# Patient Record
Sex: FEMALE | Race: WHITE | NOT HISPANIC OR LATINO | URBAN - METROPOLITAN AREA
[De-identification: names, ages, dates, MRNs, and addresses within clinical notes are randomized per-mention and may not be internally consistent; named-entity substitution may affect disease eponyms.]

---

## 2021-01-12 ENCOUNTER — INPATIENT (INPATIENT)
Facility: HOSPITAL | Age: 71
LOS: 7 days | Discharge: HOME | DRG: 629 | End: 2021-01-20
Attending: STUDENT IN AN ORGANIZED HEALTH CARE EDUCATION/TRAINING PROGRAM | Admitting: HOSPITALIST
Payer: MEDICARE

## 2021-01-12 VITALS
WEIGHT: 293 LBS | HEIGHT: 71 IN | RESPIRATION RATE: 16 BRPM | DIASTOLIC BLOOD PRESSURE: 57 MMHG | HEART RATE: 81 BPM | OXYGEN SATURATION: 98 % | SYSTOLIC BLOOD PRESSURE: 137 MMHG | TEMPERATURE: 99 F

## 2021-01-12 DIAGNOSIS — Z98.890 OTHER SPECIFIED POSTPROCEDURAL STATES: Chronic | ICD-10-CM

## 2021-01-12 DIAGNOSIS — Z86.69 PERSONAL HISTORY OF OTHER DISEASES OF THE NERVOUS SYSTEM AND SENSE ORGANS: Chronic | ICD-10-CM

## 2021-01-12 DIAGNOSIS — E11.9 TYPE 2 DIABETES MELLITUS WITHOUT COMPLICATIONS: ICD-10-CM

## 2021-01-12 DIAGNOSIS — L03.115 CELLULITIS OF RIGHT LOWER LIMB: ICD-10-CM

## 2021-01-12 DIAGNOSIS — I10 ESSENTIAL (PRIMARY) HYPERTENSION: ICD-10-CM

## 2021-01-12 DIAGNOSIS — I89.0 LYMPHEDEMA, NOT ELSEWHERE CLASSIFIED: ICD-10-CM

## 2021-01-12 DIAGNOSIS — Z29.9 ENCOUNTER FOR PROPHYLACTIC MEASURES, UNSPECIFIED: ICD-10-CM

## 2021-01-12 DIAGNOSIS — M75.00 ADHESIVE CAPSULITIS OF UNSPECIFIED SHOULDER: Chronic | ICD-10-CM

## 2021-01-12 DIAGNOSIS — S02.91XA UNSPECIFIED FRACTURE OF SKULL, INITIAL ENCOUNTER FOR CLOSED FRACTURE: Chronic | ICD-10-CM

## 2021-01-12 DIAGNOSIS — S90.851A SUPERFICIAL FOREIGN BODY, RIGHT FOOT, INITIAL ENCOUNTER: ICD-10-CM

## 2021-01-12 DIAGNOSIS — E11.69 TYPE 2 DIABETES MELLITUS WITH OTHER SPECIFIED COMPLICATION: ICD-10-CM

## 2021-01-12 LAB
ALBUMIN SERPL ELPH-MCNC: 2.7 G/DL — LOW (ref 3.3–5)
ALP SERPL-CCNC: 78 U/L — SIGNIFICANT CHANGE UP (ref 40–120)
ALT FLD-CCNC: 26 U/L — SIGNIFICANT CHANGE UP (ref 12–78)
ANION GAP SERPL CALC-SCNC: 5 MMOL/L — SIGNIFICANT CHANGE UP (ref 5–17)
APTT BLD: 26.9 SEC — LOW (ref 27.5–35.5)
AST SERPL-CCNC: 32 U/L — SIGNIFICANT CHANGE UP (ref 15–37)
BASOPHILS # BLD AUTO: 0.06 K/UL — SIGNIFICANT CHANGE UP (ref 0–0.2)
BASOPHILS NFR BLD AUTO: 0.4 % — SIGNIFICANT CHANGE UP (ref 0–2)
BILIRUB SERPL-MCNC: 0.7 MG/DL — SIGNIFICANT CHANGE UP (ref 0.2–1.2)
BUN SERPL-MCNC: 9 MG/DL — SIGNIFICANT CHANGE UP (ref 7–23)
CALCIUM SERPL-MCNC: 7.9 MG/DL — LOW (ref 8.5–10.1)
CHLORIDE SERPL-SCNC: 102 MMOL/L — SIGNIFICANT CHANGE UP (ref 96–108)
CO2 SERPL-SCNC: 30 MMOL/L — SIGNIFICANT CHANGE UP (ref 22–31)
CREAT SERPL-MCNC: 0.94 MG/DL — SIGNIFICANT CHANGE UP (ref 0.5–1.3)
EOSINOPHIL # BLD AUTO: 0.02 K/UL — SIGNIFICANT CHANGE UP (ref 0–0.5)
EOSINOPHIL NFR BLD AUTO: 0.1 % — SIGNIFICANT CHANGE UP (ref 0–6)
GLUCOSE SERPL-MCNC: 152 MG/DL — HIGH (ref 70–99)
HCT VFR BLD CALC: 39.6 % — SIGNIFICANT CHANGE UP (ref 34.5–45)
HGB BLD-MCNC: 12.9 G/DL — SIGNIFICANT CHANGE UP (ref 11.5–15.5)
IMM GRANULOCYTES NFR BLD AUTO: 0.6 % — SIGNIFICANT CHANGE UP (ref 0–1.5)
INR BLD: 1.26 RATIO — HIGH (ref 0.88–1.16)
LACTATE SERPL-SCNC: 2 MMOL/L — SIGNIFICANT CHANGE UP (ref 0.7–2)
LYMPHOCYTES # BLD AUTO: 0.58 K/UL — LOW (ref 1–3.3)
LYMPHOCYTES # BLD AUTO: 3.7 % — LOW (ref 13–44)
MCHC RBC-ENTMCNC: 29.1 PG — SIGNIFICANT CHANGE UP (ref 27–34)
MCHC RBC-ENTMCNC: 32.6 GM/DL — SIGNIFICANT CHANGE UP (ref 32–36)
MCV RBC AUTO: 89.2 FL — SIGNIFICANT CHANGE UP (ref 80–100)
MONOCYTES # BLD AUTO: 1.39 K/UL — HIGH (ref 0–0.9)
MONOCYTES NFR BLD AUTO: 8.8 % — SIGNIFICANT CHANGE UP (ref 2–14)
NEUTROPHILS # BLD AUTO: 13.68 K/UL — HIGH (ref 1.8–7.4)
NEUTROPHILS NFR BLD AUTO: 86.4 % — HIGH (ref 43–77)
NRBC # BLD: 0 /100 WBCS — SIGNIFICANT CHANGE UP (ref 0–0)
PLATELET # BLD AUTO: 290 K/UL — SIGNIFICANT CHANGE UP (ref 150–400)
POTASSIUM SERPL-MCNC: 3.6 MMOL/L — SIGNIFICANT CHANGE UP (ref 3.5–5.3)
POTASSIUM SERPL-SCNC: 3.6 MMOL/L — SIGNIFICANT CHANGE UP (ref 3.5–5.3)
PROT SERPL-MCNC: 7.2 G/DL — SIGNIFICANT CHANGE UP (ref 6–8.3)
PROTHROM AB SERPL-ACNC: 14.6 SEC — HIGH (ref 10.6–13.6)
RBC # BLD: 4.44 M/UL — SIGNIFICANT CHANGE UP (ref 3.8–5.2)
RBC # FLD: 13.2 % — SIGNIFICANT CHANGE UP (ref 10.3–14.5)
SARS-COV-2 RNA SPEC QL NAA+PROBE: SIGNIFICANT CHANGE UP
SODIUM SERPL-SCNC: 137 MMOL/L — SIGNIFICANT CHANGE UP (ref 135–145)
WBC # BLD: 15.83 K/UL — HIGH (ref 3.8–10.5)
WBC # FLD AUTO: 15.83 K/UL — HIGH (ref 3.8–10.5)

## 2021-01-12 PROCEDURE — 99284 EMERGENCY DEPT VISIT MOD MDM: CPT

## 2021-01-12 PROCEDURE — 99222 1ST HOSP IP/OBS MODERATE 55: CPT | Mod: 57

## 2021-01-12 PROCEDURE — 93010 ELECTROCARDIOGRAM REPORT: CPT

## 2021-01-12 PROCEDURE — 99223 1ST HOSP IP/OBS HIGH 75: CPT | Mod: GC

## 2021-01-12 PROCEDURE — 73590 X-RAY EXAM OF LOWER LEG: CPT | Mod: 26,50

## 2021-01-12 PROCEDURE — 71045 X-RAY EXAM CHEST 1 VIEW: CPT | Mod: 26

## 2021-01-12 PROCEDURE — 93970 EXTREMITY STUDY: CPT | Mod: 26

## 2021-01-12 PROCEDURE — 73630 X-RAY EXAM OF FOOT: CPT | Mod: 26,RT

## 2021-01-12 RX ORDER — SODIUM CHLORIDE 9 MG/ML
2200 INJECTION INTRAMUSCULAR; INTRAVENOUS; SUBCUTANEOUS ONCE
Refills: 0 | Status: COMPLETED | OUTPATIENT
Start: 2021-01-12 | End: 2021-01-12

## 2021-01-12 RX ORDER — METOPROLOL TARTRATE 50 MG
50 TABLET ORAL DAILY
Refills: 0 | Status: DISCONTINUED | OUTPATIENT
Start: 2021-01-12 | End: 2021-01-15

## 2021-01-12 RX ORDER — DEXTROSE 50 % IN WATER 50 %
12.5 SYRINGE (ML) INTRAVENOUS ONCE
Refills: 0 | Status: DISCONTINUED | OUTPATIENT
Start: 2021-01-12 | End: 2021-01-13

## 2021-01-12 RX ORDER — GLUCAGON INJECTION, SOLUTION 0.5 MG/.1ML
1 INJECTION, SOLUTION SUBCUTANEOUS ONCE
Refills: 0 | Status: DISCONTINUED | OUTPATIENT
Start: 2021-01-12 | End: 2021-01-13

## 2021-01-12 RX ORDER — MEROPENEM 1 G/30ML
500 INJECTION INTRAVENOUS ONCE
Refills: 0 | Status: COMPLETED | OUTPATIENT
Start: 2021-01-12 | End: 2021-01-12

## 2021-01-12 RX ORDER — INSULIN GLARGINE 100 [IU]/ML
35 INJECTION, SOLUTION SUBCUTANEOUS
Qty: 0 | Refills: 0 | DISCHARGE

## 2021-01-12 RX ORDER — ONDANSETRON 8 MG/1
4 TABLET, FILM COATED ORAL ONCE
Refills: 0 | Status: COMPLETED | OUTPATIENT
Start: 2021-01-12 | End: 2021-01-12

## 2021-01-12 RX ORDER — DEXTROSE 50 % IN WATER 50 %
25 SYRINGE (ML) INTRAVENOUS ONCE
Refills: 0 | Status: DISCONTINUED | OUTPATIENT
Start: 2021-01-12 | End: 2021-01-13

## 2021-01-12 RX ORDER — VANCOMYCIN HCL 1 G
1000 VIAL (EA) INTRAVENOUS ONCE
Refills: 0 | Status: COMPLETED | OUTPATIENT
Start: 2021-01-12 | End: 2021-01-12

## 2021-01-12 RX ORDER — INSULIN LISPRO 100/ML
VIAL (ML) SUBCUTANEOUS
Refills: 0 | Status: DISCONTINUED | OUTPATIENT
Start: 2021-01-12 | End: 2021-01-13

## 2021-01-12 RX ORDER — HEPARIN SODIUM 5000 [USP'U]/ML
5000 INJECTION INTRAVENOUS; SUBCUTANEOUS ONCE
Refills: 0 | Status: COMPLETED | OUTPATIENT
Start: 2021-01-12 | End: 2021-01-12

## 2021-01-12 RX ORDER — INSULIN LISPRO 100/ML
VIAL (ML) SUBCUTANEOUS AT BEDTIME
Refills: 0 | Status: DISCONTINUED | OUTPATIENT
Start: 2021-01-12 | End: 2021-01-13

## 2021-01-12 RX ORDER — MEROPENEM 1 G/30ML
1000 INJECTION INTRAVENOUS EVERY 8 HOURS
Refills: 0 | Status: DISCONTINUED | OUTPATIENT
Start: 2021-01-12 | End: 2021-01-14

## 2021-01-12 RX ORDER — DEXTROSE 50 % IN WATER 50 %
15 SYRINGE (ML) INTRAVENOUS ONCE
Refills: 0 | Status: DISCONTINUED | OUTPATIENT
Start: 2021-01-12 | End: 2021-01-13

## 2021-01-12 RX ORDER — ATORVASTATIN CALCIUM 80 MG/1
40 TABLET, FILM COATED ORAL AT BEDTIME
Refills: 0 | Status: DISCONTINUED | OUTPATIENT
Start: 2021-01-12 | End: 2021-01-15

## 2021-01-12 RX ORDER — SODIUM CHLORIDE 9 MG/ML
1000 INJECTION, SOLUTION INTRAVENOUS
Refills: 0 | Status: DISCONTINUED | OUTPATIENT
Start: 2021-01-12 | End: 2021-01-13

## 2021-01-12 RX ORDER — POTASSIUM CHLORIDE 20 MEQ
1 PACKET (EA) ORAL
Qty: 0 | Refills: 0 | DISCHARGE

## 2021-01-12 RX ORDER — FUROSEMIDE 40 MG
40 TABLET ORAL DAILY
Refills: 0 | Status: DISCONTINUED | OUTPATIENT
Start: 2021-01-12 | End: 2021-01-15

## 2021-01-12 RX ORDER — VANCOMYCIN HCL 1 G
2000 VIAL (EA) INTRAVENOUS EVERY 12 HOURS
Refills: 0 | Status: DISCONTINUED | OUTPATIENT
Start: 2021-01-12 | End: 2021-01-13

## 2021-01-12 RX ORDER — INSULIN GLARGINE 100 [IU]/ML
17 INJECTION, SOLUTION SUBCUTANEOUS AT BEDTIME
Refills: 0 | Status: DISCONTINUED | OUTPATIENT
Start: 2021-01-12 | End: 2021-01-14

## 2021-01-12 RX ADMIN — Medication 250 MILLIGRAM(S): at 23:50

## 2021-01-12 RX ADMIN — Medication 250 MILLIGRAM(S): at 18:33

## 2021-01-12 RX ADMIN — INSULIN GLARGINE 17 UNIT(S): 100 INJECTION, SOLUTION SUBCUTANEOUS at 23:51

## 2021-01-12 RX ADMIN — ATORVASTATIN CALCIUM 40 MILLIGRAM(S): 80 TABLET, FILM COATED ORAL at 23:51

## 2021-01-12 RX ADMIN — MEROPENEM 100 MILLIGRAM(S): 1 INJECTION INTRAVENOUS at 17:15

## 2021-01-12 RX ADMIN — SODIUM CHLORIDE 2200 MILLILITER(S): 9 INJECTION INTRAMUSCULAR; INTRAVENOUS; SUBCUTANEOUS at 17:14

## 2021-01-12 RX ADMIN — HEPARIN SODIUM 5000 UNIT(S): 5000 INJECTION INTRAVENOUS; SUBCUTANEOUS at 23:51

## 2021-01-12 RX ADMIN — ONDANSETRON 4 MILLIGRAM(S): 8 TABLET, FILM COATED ORAL at 17:14

## 2021-01-12 NOTE — H&P ADULT - PROBLEM SELECTOR PLAN 4
-On lantus 35 units qhs and novolog sliding scale  -Will give lantus 20 unts qhs while inpatient as diet is more controlled  -ISS  -Hypoglycemia protocol -On lantus 35 units qhs and novolog sliding scale  -Will give lantus 17 unts qhs as keeping patient NPO after MN  -ISS  -Hypoglycemia protocol -Continue home enalapril, metoprolol with hold parameters  -Monitor routine hemodynamics

## 2021-01-12 NOTE — ED ADULT NURSE NOTE - CHPI ED NUR SYMPTOMS NEG
no bleeding at site/no blood in mucus/no chills/no drainage/no fever/no purulent drainage/no rectal pain

## 2021-01-12 NOTE — ED PROVIDER NOTE - OBJECTIVE STATEMENT
71 yo F PMHx HTN, DM, lymphedema presents to ED c/o right lower leg pain/swelling and redness x 2-3 days. Pt also c/o nausea no vomiting/diarrhea, no abd pain. Pt denies any trauma, numbness/tingling, calf pain, fever/chills. States PCP told her to come to the ED for evaluation.  PCP-Leonardo

## 2021-01-12 NOTE — CONSULT NOTE ADULT - PROBLEM SELECTOR RECOMMENDATION 2
Right foot cleansed with NS and dressed with Aquacel, DSD  RFWCx obtained  Adup and NIVS ordered  Vascular consulted  f/u vascular recommendations      Planned Procedure:    Right foot I&D w/ foreign body removal - not booked    Podiatry will follow pt while in house

## 2021-01-12 NOTE — H&P ADULT - HISTORY OF PRESENT ILLNESS
Patient is 69 yo F PMHx HTN, DM, lymphedema presents to ED c/o right lower leg pain/swelling and redness for the past 3 days. States redness and swelling got significant worse overnight and subsequently notified her podiatrist Dr. Mohan who recommended ED eval. States she had a previous history of cellulitis about 10 years which required "heavy duty antibiotics." Of note, patient also had a few episodes of NBNB emesis on Friday and Monday but attributes it to a salad she ate which wasn't fresh. At this time, patient is lying in bed, appears comfortable, states pain is well controlled.     In the ED, vitals were temp 98.8, HR 81, /57, RR 16, O2 98%  Labs were significant for WBC 15.83 with L shift, aPTT 26.9, PT 14.6, INR 1.26, glucose 1152, calcium 7.9, albumin 2.7  CXR: Grossly clear lungs  Xray tibia/fibula: Arthritic changes. No evidence of destructive changes below the knees.  Xray foot: Significant soft tissue swelling, predominantly involving the lower leg. No radiographic evidence of osteomyelitis.Suspect linear foreign bodies in the plantar soft tissues at the level of metatarsals as described above.   Patient is 69 yo F PMHx HTN, DM, lymphedema presents to ED c/o right lower leg pain/swelling and redness for the past 3 days. States redness and swelling got significant worse overnight and subsequently notified her podiatrist Dr. Mohan who recommended ED eval. States she had a previous history of cellulitis about 10 years which required "heavy duty antibiotics." Of note, patient also had a few episodes of NBNB emesis on Friday and Monday but attributes it to a salad she ate which wasn't fresh. At this time, patient is lying in bed, appears comfortable, states pain is well controlled.     In the ED, vitals were temp 98.8, HR 81, /57, RR 16, O2 98%  Labs were significant for WBC 15.83 with L shift, aPTT 26.9, PT 14.6, INR 1.26, glucose 1152, calcium 7.9, albumin 2.7  CXR: Grossly clear lungs  Xray tibia/fibula: Arthritic changes. No evidence of destructive changes below the knees.  Xray foot: Significant soft tissue swelling, predominantly involving the lower leg. No radiographic evidence of osteomyelitis.Suspect linear foreign bodies in the plantar soft tissues at the level of metatarsals as described above.  S/p vanco x1, meropenem x1, NS 2200cc bolus, zofran x1   Patient is 71 yo F PMHx HTN, DM, lymphedema presents to ED c/o right lower leg pain/swelling and redness for the past 3 days. States redness and swelling got significant worse overnight and subsequently notified her podiatrist Dr. Mohan who recommended ED eval. States she had a previous history of cellulitis about 10 years which required "heavy duty antibiotics." Of note, patient also had a few episodes of NBNB emesis on Friday and Monday but attributes it to a salad she ate which wasn't fresh. At this time, patient is lying in bed, appears comfortable, states pain is well controlled.     In the ED, vitals were temp 98.8, HR 81, /57, RR 16, O2 98%  Labs were significant for WBC 15.83 with L shift, aPTT 26.9, PT 14.6, INR 1.26, glucose 1152, calcium 7.9, albumin 2.7  CXR: Grossly clear lungs  Xray tibia/fibula: Arthritic changes. No evidence of destructive changes below the knees.  Xray foot: Significant soft tissue swelling, predominantly involving the lower leg. No radiographic evidence of osteomyelitis.Suspect linear foreign bodies in the plantar soft tissues at the level of metatarsals as described above.  EKG: SR with 1 st degree AV block, known LBBB, 64bpm  S/p vanco x1, meropenem x1, NS 2200cc bolus, zofran x1   Patient is 71 yo F PMHx HTN, DM2, lymphedema presents to ED c/o right lower leg pain/swelling and redness for the past 3 days. States redness and swelling got significant worse overnight and subsequently notified her podiatrist Dr. Mohan who recommended ED eval. States she had a previous history of cellulitis about 10 years which required "heavy duty antibiotics." Of note, patient also had a few episodes of NBNB emesis on Friday and Monday but attributes it to a salad she ate which wasn't fresh. At this time, patient is lying in bed, appears comfortable, states pain is well controlled.     In the ED, vitals were temp 98.8, HR 81, /57, RR 16, O2 98%  Labs were significant for WBC 15.83 with L shift, aPTT 26.9, PT 14.6, INR 1.26, glucose 1152, calcium 7.9, albumin 2.7  CXR: Grossly clear lungs  Xray tibia/fibula: Arthritic changes. No evidence of destructive changes below the knees.  Xray foot: Significant soft tissue swelling, predominantly involving the lower leg. No radiographic evidence of osteomyelitis.Suspect linear foreign bodies in the plantar soft tissues at the level of metatarsals as described above.  EKG: SR with 1 st degree AV block, known LBBB, 64bpm  S/p vanco x1, meropenem x1, NS 2200cc bolus, zofran x1 Patient is 71 yo F PMHx HTN, DM2, LBBB (w/ neg stress, TTE 6/21 w/ mild AS), lymphedema presents to ED c/o right lower leg pain/swelling and redness for the past 3 days. States redness and swelling got significant worse overnight and subsequently notified her podiatrist Dr. Mohan who recommended ED eval. States she had a previous history of cellulitis about 10 years which required "heavy duty antibiotics." Of note, patient also had a few episodes of NBNB emesis on Friday and Monday but attributes it to a salad she ate which wasn't fresh. At this time, patient is lying in bed, appears comfortable, states pain is well controlled.     In the ED, vitals were temp 98.8, HR 81, /57, RR 16, O2 98%  Labs were significant for WBC 15.83 with L shift, aPTT 26.9, PT 14.6, INR 1.26, glucose 1152, calcium 7.9, albumin 2.7  CXR: Grossly clear lungs  Xray tibia/fibula: Arthritic changes. No evidence of destructive changes below the knees.  Xray foot: Significant soft tissue swelling, predominantly involving the lower leg. No radiographic evidence of osteomyelitis.Suspect linear foreign bodies in the plantar soft tissues at the level of metatarsals as described above.  EKG: SR with 1 st degree AV block, known LBBB, 64bpm  S/p vanco x1, meropenem x1, NS 2200cc bolus, zofran x1

## 2021-01-12 NOTE — ED ADULT NURSE REASSESSMENT NOTE - NS ED NURSE REASSESS COMMENT FT1
Pt received from [facility]. A+O x 4. Right foot and leg redness, swelling, and discharge. Walks with a cane at baseline from home. calm and cooperative. side rails up. VSS. awaiting admission. plan of care discussed with patient. labs pending. covid swab pending. will continue to monitor.

## 2021-01-12 NOTE — ED PROVIDER NOTE - PSH
Fractured skull    Frozen shoulder    H/O Achilles tendon repair  lengthened bilaterally  H/O cataract  bilateral  History of surgical removal of meniscus of knee  left in 1971

## 2021-01-12 NOTE — ED PROVIDER NOTE - ATTENDING CONTRIBUTION TO CARE
71 yo diabetic with RLE redness and swelling x past ~ 2 - 3 days. no fever/chills. no cp/sob/palp. no numb/ting/focal weak. no neck / back pain. no vomiting / diarrhea. no known covid exposures. No agg/allev factors. No other acute co.  Exam: morbid obesity,  chronic bl le edema. RLE ant lower leg erythema / warmth. nl dist str/sens equal bl, nl cap refill. No other acute findings.   Check labs, IV abx, XR, TBA

## 2021-01-12 NOTE — H&P ADULT - NSHPPHYSICALEXAM_GEN_ALL_CORE
Physical Exam  General: No apparent distress  Head: normocephalic, atraumatic  Eyes: EOMI, anicteric  ENT: moist mucous membranes  Heart: rrr, S1, S2, no murmurs  Chest: CTA b/l, no rales, rhonchi, or wheezes  Abd: BS+, soft, NT, ND  Extr: RLE erythematous, edematous, tender/warm to touch, LLE chronic venous stasis changes  Neuro: AA&Ox3, no focal weakness, sensation to light touch intact  Psych: normal affect T(C): 36.9 (01-12-21 @ 20:30), Max: 37.1 (01-12-21 @ 15:16)  HR: 74 (01-12-21 @ 20:30) (74 - 81)  BP: 151/64 (01-12-21 @ 20:30) (137/57 - 151/64)  RR: 16 (01-12-21 @ 20:30) (16 - 16)  SpO2: 97% (01-12-21 @ 20:30) (97% - 98%)    Constitutional: NAD, well-developed, well-nourished  Ears, Nose, Mouth, and Throat: normal external ears and nose, normal hearing, moist oral mucosa  Eyes: normal conjunctiva, EOMI, PERRL  Neck: supple, no JVD  Respiratory: Clear to auscultation bilaterally. No wheezes, rales or rhonchi. Normal respiratory effort  Cardiovascular: RRR, no M/R/G, no edema, 2+ Peripheral Pulses  Gastrointestinal: soft, nontender, nondistended, +BS, no hernia  Skin: +RLE erythematous, edematous, tender/warm to touch, LLE chronic venous stasis changes  Neurologic: sensation grossly intact, CN grossly intact, non-focal exam  Musculoskeletal: no clubbing, no cyanosis, no joint swelling  Psychiatric: AOX3, appropriate mood, affect

## 2021-01-12 NOTE — H&P ADULT - PROBLEM SELECTOR PLAN 2
Xray tibia/fibula: Arthritic changes. No evidence of destructive changes below the knees.  -Xray foot: Significant soft tissue swelling, predominantly involving the lower leg. No radiographic evidence of osteomyelitis. Suspect linear foreign bodies in the plantar soft tissues at the level of metatarsals as described above.  -F/u wound culture  -Plan for Right foot I&D w/ foreign body removal, pending vascular eval  -Vascular consulted by podiatry, f/u recs  -Keep NPO after midnight in case of OR tomorrow

## 2021-01-12 NOTE — ED PROVIDER NOTE - CARE PLAN
Principal Discharge DX:	Cellulitis of right lower extremity   Principal Discharge DX:	Cellulitis of right lower extremity  Secondary Diagnosis:	Foreign body (FB) in soft tissue

## 2021-01-12 NOTE — ED ADULT NURSE NOTE - OBJECTIVE STATEMENT
Pt p/w R foot redness and swelling x 1 day. Hx of cellulitis to foot. Pt reports gradual onset of redness and nausea today. Hx of diabetes and weeping edema.

## 2021-01-12 NOTE — H&P ADULT - PROBLEM SELECTOR PLAN 1
-Patient with RLE swelling, pain, erythema x2-3 days  -Admit to tele  -WBC elevated, currently afebrile  -S/p vanco and meropenem, will continue at this time  -Podiatry consulted, Dr. Lovell, f/u recs  -ID consulted, Dr. Chisholm, f/u recs -Patient with RLE swelling, pain, erythema x2-3 days  -Admit to tele  -WBC elevated, currently afebrile  -S/p vanco and meropenem, will continue at this time. Spoke to pharmacy, vanco 1g given in ED is not sufficient as patient is 172kg. Will give another 1g stat and then vanco 2g BID  -F/u vanco trough  -Podiatry consulted, Dr. Lovell, f/u recs  -ID consulted, Dr. Chisholm, f/u recs -Patient with RLE swelling, pain, erythema x2-3 days  -Admit to [facility]  -WBC elevated, currently afebrile  -S/p vanco and meropenem, will continue at this time. Spoke to pharmacy, vanco 1g given in ED is not sufficient as patient is 172kg. Will give another 1g stat and then vanco 2g BID  -F/u vanco trough  -Podiatry consulted, Dr. Lovell, f/u recs  -ID consulted, Dr. Chisholm, f/u recs

## 2021-01-12 NOTE — H&P ADULT - PROBLEM SELECTOR PLAN 6
Will give heparin 5000units subq x1. Defer further DVT ppx as unsure of timing of OR procedure    IMPROVE VTE Individual Risk Assessment          RISK                                                          Points  [  ] Previous VTE                                                3  [  ] Thrombophilia                                             2  [  ] Lower limb paralysis                                   2        (unable to hold up >15 seconds)    [  ] Current Cancer                                             2         (within 6 months)  [  ] Immobilization > 24 hrs                              1  [  ] ICU/CCU stay > 24 hours                             1  [  ] Age > 60                                                         1    IMPROVE VTE Score: 1

## 2021-01-12 NOTE — H&P ADULT - NSICDXPASTSURGICALHX_GEN_ALL_CORE_FT
PAST SURGICAL HISTORY:  Fractured skull 1968    Frozen shoulder 2000    H/O Achilles tendon repair lengthened bilaterally, 2000    H/O cataract 2020    History of surgical removal of meniscus of knee left in 1971

## 2021-01-12 NOTE — H&P ADULT - PROBLEM SELECTOR PLAN 3
-Continue home enalapril, metoprolol with hold parameters  -Monitor routine hemodynamics -On lantus 35 units qhs and novolog sliding scale  -Will give lantus 17 unts qhs as keeping patient NPO after MN  -ISS  -Hypoglycemia protocol

## 2021-01-12 NOTE — CONSULT NOTE ADULT - PROBLEM SELECTOR RECOMMENDATION 9
Pt evaluated and chart reviewed  Right foot cleansed with NS and dressed with Aquacel, DSD  RFWCx obtained  Adup and NIVS ordered  Vascular consulted  f/u vascular recommendations      Planned Procedure:    Right foot I&D w/ foreign body removal    Podiatry will follow pt while in house Pt evaluated and chart reviewed    See plan #2

## 2021-01-12 NOTE — H&P ADULT - NSHPREVIEWOFSYSTEMS_GEN_ALL_CORE
CONSTITUTIONAL: denies fever, chills, fatigue, weakness  HEENT: denies blurred vision, sore throat  SKIN: denies new lesions, rash  CARDIOVASCULAR: denies chest pain, chest pressure, palpitations  RESPIRATORY: denies shortness of breath, sputum production  GASTROINTESTINAL: denies nausea, vomiting, diarrhea, abdominal pain  GENITOURINARY: denies dysuria, discharge  NEUROLOGICAL: denies numbness, headache, focal weakness  MUSCULOSKELETAL: denies new joint pain, muscle aches  HEMATOLOGIC: denies gross bleeding, bruising  EXT: RLE swelling, pain, LLE chronic changes Constitutional Symptoms: No weakness, fevers, chills, weight loss  Eyes: No visual changes, headache, eye pain, double vision  Ears, Nose, Mouth, Throat: No runny nose, sinus pain, ear pain, tinnitus, sore throat, dysphagia, odynophagia  Cardiovascular: No chest pain, palpitations, edema  Respiratory: No cough, wheezing, hemoptysis, shortness of breath  Gastrointestinal: No abdominal pain, dysphagia, anorexia, nausea/vomiting, diarrhea/constipation, hematemesis, BRBPR, melena  Genitourinary: No dysuria, frequency, hematuria  Musculoskeletal: No joint pain, joint swelling, decreased ROM  Skin: +erythema, RLE swelling, pain, LLE chronic changes  Neurologic:  No seizures, headache, paraesthesia, numbness, limb weakness    Positives and pertinent negatives noted and all other systems negative

## 2021-01-12 NOTE — H&P ADULT - NSHPSOCIALHISTORY_GEN_ALL_CORE
Lives with family  Uses cane outside of the house  Performs ADLs independently   Never smoker, denies etoh or drug use

## 2021-01-12 NOTE — H&P ADULT - ASSESSMENT
Patient is 69 yo F PMHx HTN, DM, lymphedema presents to ED c/o right lower leg pain/swelling and redness for the past 3 days. Admitted for RLE cellulitis, found to have foreign body in foot.  Patient is 69 yo F PMHx HTN, DM, lymphedema presents to ED c/o right lower leg pain/swelling and redness for the past 3 days. Admitted for RLE cellulitis, found to have foreign body in foot Patient is 69 yo F PMHx HTN, DM2,  LBBB (w/ neg stress, TTE 6/21 w/ mild AS), lymphedema presents to ED c/o right lower leg pain/swelling and redness for the past 3 days. Admitted for RLE cellulitis, found to have foreign body in foot

## 2021-01-12 NOTE — CONSULT NOTE ADULT - SUBJECTIVE AND OBJECTIVE BOX
70y year old Female seen at [facility] ED for right foot cellulitis.  Pt said last night 1/11/21 she noticed her right foot medial arch was read and this morning in the AM her right foot was swollen and the redness was now on the dorsal forefoot.  Pt said she has mild pain on the plantar and dorsal forefoot.  Pt said on   Denies any fever, chills, nausea, vomiting, chest pain, shortness of breath, or calf pain at this time.    REVIEW OF SYSTEMS    PAST MEDICAL & SURGICAL HISTORY:  Diabetes    Hypertension    Fractured skull    H/O Achilles tendon repair  lengthened bilaterally    Frozen shoulder    History of surgical removal of meniscus of knee  left in 1971    H/O cataract  bilateral        Allergies    penicillins (Hives)    Intolerances        MEDICATIONS  (STANDING):  vancomycin  IVPB 1000 milliGRAM(s) IV Intermittent once    MEDICATIONS  (PRN):      Social History:      FAMILY HISTORY:      Vital Signs Last 24 Hrs  T(C): 37.1 (12 Jan 2021 15:16), Max: 37.1 (12 Jan 2021 15:16)  T(F): 98.8 (12 Jan 2021 15:16), Max: 98.8 (12 Jan 2021 15:16)  HR: 81 (12 Jan 2021 15:16) (81 - 81)  BP: 137/57 (12 Jan 2021 15:16) (137/57 - 137/57)  BP(mean): --  RR: 16 (12 Jan 2021 15:16) (16 - 16)  SpO2: 98% (12 Jan 2021 15:16) (98% - 98%)    PHYSICAL EXAM:  Vascular: DP & PT palpable bilaterally, Capillary refill 3 seconds, Digital hair present bilaterally  Neurological: Light touch sensation intact bilaterally  Musculoskeletal: 5/5 strength in all quadrants bilaterally, AJ & STJ ROM intact  Dermatological: ---------- cm ulceration noted to the ----------, granular wound bed, no probe to bone, no periwound erythema, no fluctuance, no malodor, no proximal streaking at this time    CBC Full  -  ( 12 Jan 2021 17:30 )  WBC Count : 15.83 K/uL  RBC Count : 4.44 M/uL  Hemoglobin : 12.9 g/dL  Hematocrit : 39.6 %  Platelet Count - Automated : 290 K/uL  Mean Cell Volume : 89.2 fl  Mean Cell Hemoglobin : 29.1 pg  Mean Cell Hemoglobin Concentration : 32.6 gm/dL  Auto Neutrophil # : 13.68 K/uL  Auto Lymphocyte # : 0.58 K/uL  Auto Monocyte # : 1.39 K/uL  Auto Eosinophil # : 0.02 K/uL  Auto Basophil # : 0.06 K/uL  Auto Neutrophil % : 86.4 %  Auto Lymphocyte % : 3.7 %  Auto Monocyte % : 8.8 %  Auto Eosinophil % : 0.1 %  Auto Basophil % : 0.4 %      ----------CHEM PANEL----------            Imaging: ----------   70y year old Female seen at [facility] ED for right foot cellulitis.  Pt said last night 1/11/21 she noticed her right foot medial arch was red and this morning in the AM the entire foot was swollen and the redness was now on the dorsal forefoot.  Pt said she has mild pain on the plantar and dorsal forefoot.  Pt said on 1/8/21 she vomited 3-4 times, 1/11/21 3-4 times and 2 times today.  Denies any fever, chills, nausea, vomiting, chest pain, shortness of breath, or calf pain at this time.    REVIEW OF SYSTEMS    PAST MEDICAL & SURGICAL HISTORY:  Diabetes    Hypertension    Fractured skull    H/O Achilles tendon repair  lengthened bilaterally    Frozen shoulder    History of surgical removal of meniscus of knee  left in 1971    H/O cataract  bilateral        Allergies    penicillins (Hives)    Intolerances        MEDICATIONS  (STANDING):  vancomycin  IVPB 1000 milliGRAM(s) IV Intermittent once    MEDICATIONS  (PRN):      Social History:      FAMILY HISTORY:      Vital Signs Last 24 Hrs  T(C): 37.1 (12 Jan 2021 15:16), Max: 37.1 (12 Jan 2021 15:16)  T(F): 98.8 (12 Jan 2021 15:16), Max: 98.8 (12 Jan 2021 15:16)  HR: 81 (12 Jan 2021 15:16) (81 - 81)  BP: 137/57 (12 Jan 2021 15:16) (137/57 - 137/57)  BP(mean): --  RR: 16 (12 Jan 2021 15:16) (16 - 16)  SpO2: 98% (12 Jan 2021 15:16) (98% - 98%)    PHYSICAL EXAM:  Vascular: DP & PT nonpalpable bilaterally due to edema, Capillary refill 3 seconds.  Nonpitting edema to leg b/l.  2+ pitting edema to dorsal foot b/l  Neurological: Light touch sensation intact bilaterally  Musculoskeletal: POP plantar right forefoot   Dermatological: Wound (1)  Right foot sub 1st met head puncture wound.  Approximately 0.1 cm annular.  0.5 cc white purulence expressed.      CBC Full  -  ( 12 Jan 2021 17:30 )  WBC Count : 15.83 K/uL  RBC Count : 4.44 M/uL  Hemoglobin : 12.9 g/dL  Hematocrit : 39.6 %  Platelet Count - Automated : 290 K/uL  Mean Cell Volume : 89.2 fl  Mean Cell Hemoglobin : 29.1 pg  Mean Cell Hemoglobin Concentration : 32.6 gm/dL  Auto Neutrophil # : 13.68 K/uL  Auto Lymphocyte # : 0.58 K/uL  Auto Monocyte # : 1.39 K/uL  Auto Eosinophil # : 0.02 K/uL  Auto Basophil # : 0.06 K/uL  Auto Neutrophil % : 86.4 %  Auto Lymphocyte % : 3.7 %  Auto Monocyte % : 8.8 %  Auto Eosinophil % : 0.1 %  Auto Basophil % : 0.4 %      ----------CHEM PANEL----------            Imaging: ----------  X-ray: 2 foreign bodies appreciated right forefoot and midfoot

## 2021-01-12 NOTE — ED ADULT NURSE NOTE - PSH
Frozen shoulder    H/O Achilles tendon repair  lengthened bilaterally  H/O cataract  bilateral  History of surgical removal of meniscus of knee  left in 1971   Fractured skull    Frozen shoulder    H/O Achilles tendon repair  lengthened bilaterally  H/O cataract  bilateral  History of surgical removal of meniscus of knee  left in 1971

## 2021-01-13 LAB
A1C WITH ESTIMATED AVERAGE GLUCOSE RESULT: 6.6 % — HIGH (ref 4–5.6)
ALBUMIN SERPL ELPH-MCNC: 2.6 G/DL — LOW (ref 3.3–5)
ALP SERPL-CCNC: 76 U/L — SIGNIFICANT CHANGE UP (ref 40–120)
ALT FLD-CCNC: 24 U/L — SIGNIFICANT CHANGE UP (ref 12–78)
ANION GAP SERPL CALC-SCNC: 7 MMOL/L — SIGNIFICANT CHANGE UP (ref 5–17)
AST SERPL-CCNC: 26 U/L — SIGNIFICANT CHANGE UP (ref 15–37)
BASOPHILS # BLD AUTO: 0 K/UL — SIGNIFICANT CHANGE UP (ref 0–0.2)
BASOPHILS NFR BLD AUTO: 0 % — SIGNIFICANT CHANGE UP (ref 0–2)
BILIRUB SERPL-MCNC: 0.7 MG/DL — SIGNIFICANT CHANGE UP (ref 0.2–1.2)
BUN SERPL-MCNC: 11 MG/DL — SIGNIFICANT CHANGE UP (ref 7–23)
CALCIUM SERPL-MCNC: 7.9 MG/DL — LOW (ref 8.5–10.1)
CHLORIDE SERPL-SCNC: 102 MMOL/L — SIGNIFICANT CHANGE UP (ref 96–108)
CO2 SERPL-SCNC: 31 MMOL/L — SIGNIFICANT CHANGE UP (ref 22–31)
CREAT SERPL-MCNC: 0.97 MG/DL — SIGNIFICANT CHANGE UP (ref 0.5–1.3)
EOSINOPHIL # BLD AUTO: 0 K/UL — SIGNIFICANT CHANGE UP (ref 0–0.5)
EOSINOPHIL NFR BLD AUTO: 0 % — SIGNIFICANT CHANGE UP (ref 0–6)
ESTIMATED AVERAGE GLUCOSE: 143 MG/DL — HIGH (ref 68–114)
GLUCOSE SERPL-MCNC: 171 MG/DL — HIGH (ref 70–99)
HCT VFR BLD CALC: 35.5 % — SIGNIFICANT CHANGE UP (ref 34.5–45)
HCV AB S/CO SERPL IA: 0.13 S/CO — SIGNIFICANT CHANGE UP (ref 0–0.99)
HCV AB SERPL-IMP: SIGNIFICANT CHANGE UP
HGB BLD-MCNC: 11.5 G/DL — SIGNIFICANT CHANGE UP (ref 11.5–15.5)
LYMPHOCYTES # BLD AUTO: 0.36 K/UL — LOW (ref 1–3.3)
LYMPHOCYTES # BLD AUTO: 2 % — LOW (ref 13–44)
MCHC RBC-ENTMCNC: 29 PG — SIGNIFICANT CHANGE UP (ref 27–34)
MCHC RBC-ENTMCNC: 32.4 GM/DL — SIGNIFICANT CHANGE UP (ref 32–36)
MCV RBC AUTO: 89.4 FL — SIGNIFICANT CHANGE UP (ref 80–100)
MONOCYTES # BLD AUTO: 2.36 K/UL — HIGH (ref 0–0.9)
MONOCYTES NFR BLD AUTO: 13 % — SIGNIFICANT CHANGE UP (ref 2–14)
MRSA PCR RESULT.: SIGNIFICANT CHANGE UP
NEUTROPHILS # BLD AUTO: 15.4 K/UL — HIGH (ref 1.8–7.4)
NEUTROPHILS NFR BLD AUTO: 83 % — HIGH (ref 43–77)
NRBC # BLD: SIGNIFICANT CHANGE UP /100 WBCS (ref 0–0)
PLATELET # BLD AUTO: 237 K/UL — SIGNIFICANT CHANGE UP (ref 150–400)
POTASSIUM SERPL-MCNC: 3.2 MMOL/L — LOW (ref 3.5–5.3)
POTASSIUM SERPL-SCNC: 3.2 MMOL/L — LOW (ref 3.5–5.3)
PROT SERPL-MCNC: 6.8 G/DL — SIGNIFICANT CHANGE UP (ref 6–8.3)
RBC # BLD: 3.97 M/UL — SIGNIFICANT CHANGE UP (ref 3.8–5.2)
RBC # FLD: 13.4 % — SIGNIFICANT CHANGE UP (ref 10.3–14.5)
S AUREUS DNA NOSE QL NAA+PROBE: DETECTED
SARS-COV-2 IGG SERPL QL IA: NEGATIVE — SIGNIFICANT CHANGE UP
SARS-COV-2 IGM SERPL IA-ACNC: <0.1 INDEX — SIGNIFICANT CHANGE UP
SODIUM SERPL-SCNC: 140 MMOL/L — SIGNIFICANT CHANGE UP (ref 135–145)
WBC # BLD: 18.12 K/UL — HIGH (ref 3.8–10.5)
WBC # FLD AUTO: 18.12 K/UL — HIGH (ref 3.8–10.5)

## 2021-01-13 PROCEDURE — 99222 1ST HOSP IP/OBS MODERATE 55: CPT

## 2021-01-13 PROCEDURE — 99233 SBSQ HOSP IP/OBS HIGH 50: CPT | Mod: GC

## 2021-01-13 PROCEDURE — 93926 LOWER EXTREMITY STUDY: CPT | Mod: 26,RT

## 2021-01-13 PROCEDURE — 93922 UPR/L XTREMITY ART 2 LEVELS: CPT | Mod: 26

## 2021-01-13 PROCEDURE — 99232 SBSQ HOSP IP/OBS MODERATE 35: CPT

## 2021-01-13 RX ORDER — SODIUM CHLORIDE 9 MG/ML
1000 INJECTION, SOLUTION INTRAVENOUS
Refills: 0 | Status: DISCONTINUED | OUTPATIENT
Start: 2021-01-13 | End: 2021-01-14

## 2021-01-13 RX ORDER — LINEZOLID 600 MG/300ML
600 INJECTION, SOLUTION INTRAVENOUS EVERY 12 HOURS
Refills: 0 | Status: DISCONTINUED | OUTPATIENT
Start: 2021-01-13 | End: 2021-01-15

## 2021-01-13 RX ORDER — INSULIN LISPRO 100/ML
VIAL (ML) SUBCUTANEOUS AT BEDTIME
Refills: 0 | Status: DISCONTINUED | OUTPATIENT
Start: 2021-01-13 | End: 2021-01-14

## 2021-01-13 RX ORDER — DEXTROSE 50 % IN WATER 50 %
15 SYRINGE (ML) INTRAVENOUS ONCE
Refills: 0 | Status: DISCONTINUED | OUTPATIENT
Start: 2021-01-13 | End: 2021-01-14

## 2021-01-13 RX ORDER — ONDANSETRON 8 MG/1
4 TABLET, FILM COATED ORAL ONCE
Refills: 0 | Status: COMPLETED | OUTPATIENT
Start: 2021-01-13 | End: 2021-01-13

## 2021-01-13 RX ORDER — DEXTROSE 50 % IN WATER 50 %
25 SYRINGE (ML) INTRAVENOUS ONCE
Refills: 0 | Status: DISCONTINUED | OUTPATIENT
Start: 2021-01-13 | End: 2021-01-14

## 2021-01-13 RX ORDER — ENOXAPARIN SODIUM 100 MG/ML
40 INJECTION SUBCUTANEOUS EVERY 12 HOURS
Refills: 0 | Status: DISCONTINUED | OUTPATIENT
Start: 2021-01-13 | End: 2021-01-13

## 2021-01-13 RX ORDER — INSULIN LISPRO 100/ML
VIAL (ML) SUBCUTANEOUS
Refills: 0 | Status: DISCONTINUED | OUTPATIENT
Start: 2021-01-13 | End: 2021-01-14

## 2021-01-13 RX ORDER — ACETAMINOPHEN 500 MG
650 TABLET ORAL EVERY 6 HOURS
Refills: 0 | Status: DISCONTINUED | OUTPATIENT
Start: 2021-01-13 | End: 2021-01-15

## 2021-01-13 RX ORDER — ACETAMINOPHEN 500 MG
650 TABLET ORAL ONCE
Refills: 0 | Status: COMPLETED | OUTPATIENT
Start: 2021-01-13 | End: 2021-01-13

## 2021-01-13 RX ORDER — DEXTROSE 50 % IN WATER 50 %
12.5 SYRINGE (ML) INTRAVENOUS ONCE
Refills: 0 | Status: DISCONTINUED | OUTPATIENT
Start: 2021-01-13 | End: 2021-01-14

## 2021-01-13 RX ORDER — GLUCAGON INJECTION, SOLUTION 0.5 MG/.1ML
1 INJECTION, SOLUTION SUBCUTANEOUS ONCE
Refills: 0 | Status: DISCONTINUED | OUTPATIENT
Start: 2021-01-13 | End: 2021-01-14

## 2021-01-13 RX ORDER — POTASSIUM CHLORIDE 20 MEQ
40 PACKET (EA) ORAL ONCE
Refills: 0 | Status: COMPLETED | OUTPATIENT
Start: 2021-01-13 | End: 2021-01-13

## 2021-01-13 RX ORDER — HEPARIN SODIUM 5000 [USP'U]/ML
7500 INJECTION INTRAVENOUS; SUBCUTANEOUS EVERY 8 HOURS
Refills: 0 | Status: DISCONTINUED | OUTPATIENT
Start: 2021-01-13 | End: 2021-01-14

## 2021-01-13 RX ADMIN — Medication 20 MILLIGRAM(S): at 06:27

## 2021-01-13 RX ADMIN — Medication 2: at 22:00

## 2021-01-13 RX ADMIN — HEPARIN SODIUM 7500 UNIT(S): 5000 INJECTION INTRAVENOUS; SUBCUTANEOUS at 22:02

## 2021-01-13 RX ADMIN — INSULIN GLARGINE 17 UNIT(S): 100 INJECTION, SOLUTION SUBCUTANEOUS at 22:00

## 2021-01-13 RX ADMIN — Medication 40 MILLIGRAM(S): at 06:26

## 2021-01-13 RX ADMIN — Medication 40 MILLIEQUIVALENT(S): at 12:41

## 2021-01-13 RX ADMIN — ATORVASTATIN CALCIUM 40 MILLIGRAM(S): 80 TABLET, FILM COATED ORAL at 22:03

## 2021-01-13 RX ADMIN — Medication 1: at 12:32

## 2021-01-13 RX ADMIN — MEROPENEM 100 MILLIGRAM(S): 1 INJECTION INTRAVENOUS at 05:46

## 2021-01-13 RX ADMIN — LINEZOLID 300 MILLIGRAM(S): 600 INJECTION, SOLUTION INTRAVENOUS at 12:33

## 2021-01-13 RX ADMIN — Medication 3: at 17:27

## 2021-01-13 RX ADMIN — MEROPENEM 100 MILLIGRAM(S): 1 INJECTION INTRAVENOUS at 16:09

## 2021-01-13 RX ADMIN — HEPARIN SODIUM 7500 UNIT(S): 5000 INJECTION INTRAVENOUS; SUBCUTANEOUS at 14:22

## 2021-01-13 RX ADMIN — Medication 50 MILLIGRAM(S): at 12:42

## 2021-01-13 RX ADMIN — Medication 650 MILLIGRAM(S): at 06:26

## 2021-01-13 RX ADMIN — ONDANSETRON 4 MILLIGRAM(S): 8 TABLET, FILM COATED ORAL at 03:29

## 2021-01-13 RX ADMIN — MEROPENEM 100 MILLIGRAM(S): 1 INJECTION INTRAVENOUS at 22:08

## 2021-01-13 NOTE — CONSULT NOTE ADULT - ASSESSMENT
Pt is a 70W w/ PMHx of HTN, DM2, LBBB (w/ neg stress, TTE 6/21 w/ mild AS), lymphedema presents to ED c/o RLE pain/swelling/redness x3 days. Admitted for RLE cellulitis, found to have foreign body in foot    **THIS IS NOT A COMPLETE NOTE, FULL NOTE TO FOLLOW PENDING EVALUATION**    RLE Cellulitis  Foreign body in foot  Leukocytosis  -imaging reviewed  --Xray foot: Significant soft tissue swelling, predominantly involving the lower leg. No radiographic evidence of osteomyelitis.   Suspect linear foreign bodies in the plantar soft tissues at the level of metatarsals as described above.  -pending WCx  -pending BCx  -MRSA nasal swab  -appreciate podiatry recs  --plan for I&D w/ foreign body removal  --please send any fluid recovered for culture to help guide Abx therapy  -trend fevers/leukocytosis  -s/p vanc/meropenem in the Ed  -currently on linezolid  -c/w meropenem for now; will review allergy w/ pt and narrow therapy    Penicillin Allergy    DM2  -strict glucose control in setting of acute infection    Lymphedema  -c/w home lasix  -c/w limb elevation   Pt is a 70W w/ PMHx of HTN, DM2, LBBB (w/ neg stress, TTE 6/21 w/ mild AS), lymphedema presents to ED c/o RLE pain/swelling/redness x3 days. Admitted for RLE cellulitis, found to have foreign body in foot    RLE Cellulitis  Foreign body in foot  Leukocytosis  -imaging reviewed  --Xray foot: Significant soft tissue swelling, predominantly involving the lower leg. No radiographic evidence of osteomyelitis.   Suspect linear foreign bodies in the plantar soft tissues at the level of metatarsals as described above.  -pending WCx  -pending BCx  -MRSA nasal swab  -appreciate podiatry recs  --plan for I&D w/ foreign body removal  --please send any fluid recovered for culture to help guide Abx therapy  -trend fevers/leukocytosis  --leukocytosis uptrending, c/w monitoring  -s/p vanc/meropenem in the ED  -switched to linezolid as vancomycin unlikely to reach therapeutic levels  -c/w linezolid 600mg IV q6h  -c/w meropenem for now; will review allergy w/ pt and narrow therapy if possible    Penicillin Allergy    DM2  -strict glucose control in setting of acute infection    Lymphedema  -c/w home lasix  -c/w limb elevation   Pt is a 70W w/ PMHx of HTN, DM2, LBBB (w/ neg stress, TTE 6/21 w/ mild AS), lymphedema presents to ED c/o RLE pain/swelling/redness x3 days. Admitted for RLE cellulitis, found to have foreign body in foot    RLE Cellulitis  Foreign body in foot  Fevers  Leukocytosis  -imaging reviewed  --Xray foot: Significant soft tissue swelling, predominantly involving the lower leg. No radiographic evidence of osteomyelitis.   Suspect linear foreign bodies in the plantar soft tissues at the level of metatarsals as described above.  -pending WCx  -pending BCx  -MRSA nasal swab  -appreciate podiatry recs  --plan for I&D w/ foreign body removal  --please send any fluid recovered for culture to help guide Abx therapy  -trend fevers/leukocytosis  --leukocytosis uptrending, c/w monitoring  -s/p vanc/meropenem in the ED  -switched to linezolid as vancomycin unlikely to reach therapeutic levels  -c/w linezolid 600mg IV q6h  -c/w meropenem for now; will review allergy w/ pt and narrow therapy if possible    Penicillin Allergy    DM2  -strict glucose control in setting of acute infection    Lymphedema  -c/w home lasix  -c/w limb elevation   Pt is a 70W w/ PMHx of HTN, DM2, LBBB (w/ neg stress, TTE 6/21 w/ mild AS), lymphedema presents to ED c/o RLE pain/swelling/redness x3 days. Admitted for RLE cellulitis, found to have foreign body in foot    RLE Cellulitis  Foreign body in foot  Fevers  Leukocytosis  -imaging reviewed  --Xray foot: Significant soft tissue swelling, predominantly involving the lower leg. No radiographic evidence of osteomyelitis.   Suspect linear foreign bodies in the plantar soft tissues at the level of metatarsals as described above.  -pending WCx  -pending BCx  -MRSA nasal swab  -appreciate podiatry recs  --plan for I&D w/ foreign body removal  --please send any fluid recovered for culture to help guide Abx therapy  -trend fevers/leukocytosis  --leukocytosis uptrending, c/w monitoring  -s/p vanc/meropenem in the ED  -switched to linezolid as vancomycin unlikely to reach therapeutic levels  -c/w linezolid 600mg IV q12h  -c/w meropenem for now; will review allergy w/ pt and narrow therapy if possible    Penicillin Allergy    DM2  -strict glucose control in setting of acute infection    Lymphedema  -c/w home lasix  -c/w limb elevation

## 2021-01-13 NOTE — CONSULT NOTE ADULT - ATTENDING COMMENTS
Infectious Diseases will continue to follow. Please call with any questions.   Felipa Rahman M.D.  [facility], Division of Infectious Diseases [phone]  For over the weekend and after hours, please call [phone]
Patient evaluated at the bedside. Non invasive vascular studies reviewed. No evidence of PVD. No contraindication for R foot intervention.

## 2021-01-13 NOTE — PROGRESS NOTE ADULT - PROBLEM SELECTOR PLAN 1
-Patient with RLE swelling, pain, erythema x2-3 days  -WBC elevated and uptrending, febrile overnight  -S/p vanco and meropenem  -Continue linezolid and meropenem as per ID  - F/u wound culture, blood culture x 2, and urine culture   -Podiatry consulted, Dr. Lovell, f/u recs  -ID consulted, Dr. Chisholm, recs appreciated

## 2021-01-13 NOTE — PROGRESS NOTE ADULT - PROBLEM SELECTOR PLAN 2
Wound cleansed with NS and dressed with DSD  f/u LFWCx    Planned Procedure:    Right foot I&D w/ foreign body removal - not booked    Podiatry will follow pt while in house.

## 2021-01-13 NOTE — PROGRESS NOTE ADULT - PROBLEM SELECTOR PLAN 2
-Plan for Right foot I&D w/ foreign body removal  -Xray foot: Significant soft tissue swelling, predominantly involving the lower leg. No radiographic evidence of osteomyelitis. Suspect linear foreign bodies in the plantar soft tissues at the level of metatarsals as described above.  - Arterial Doppler- Flow could only seen in the anterior tibial artery however nonvisualization of the posterior tibial and peroneal arteries may be technical. Elevated velocities in the dorsalis pedis artery likely reflect underlying hemodynamically significant stenosis  -RENEE's within normal limits bilaterally  -Vascular, Dr. Hanson consulted - No vascular contraindication to planned procedure per podiatry  -F/u wound culture  - Podiatry plan for procedure tomorrow or Friday -Plan for Right foot I&D w/ foreign body removal  -Xray foot: Significant soft tissue swelling, predominantly involving the lower leg. No radiographic evidence of osteomyelitis. Suspect linear foreign bodies in the plantar soft tissues at the level of metatarsals as described above.  - Arterial Doppler- Flow could only seen in the anterior tibial artery however nonvisualization of the posterior tibial and peroneal arteries may be technical. Elevated velocities in the dorsalis pedis artery likely reflect underlying hemodynamically significant stenosis  -RENEE's within normal limits bilaterally  -Vascular, Dr. Hanson consulted - No vascular contraindication to planned procedure per podiatry  -F/u wound culture  - Podiatry plan for procedure tomorrow or Friday  - will keep NPO after MN just in case procedure is tomorrow (f/u in AM)

## 2021-01-13 NOTE — PROGRESS NOTE ADULT - ASSESSMENT
Patient is 71 yo F PMHx HTN, DM2,  LBBB (w/ neg stress, TTE 6/21 w/ mild AS), lymphedema presents to ED c/o right lower leg pain/swelling and redness for the past 3 days. Admitted for RLE cellulitis, found to have foreign body in foot

## 2021-01-13 NOTE — PROGRESS NOTE ADULT - PROBLEM SELECTOR PLAN 6
Will give heparin 7500units subq   - Will stop prior to OR procedure    IMPROVE VTE Individual Risk Assessment          RISK                                                          Points  [  ] Previous VTE                                                3  [  ] Thrombophilia                                             2  [  ] Lower limb paralysis                                   2        (unable to hold up >15 seconds)    [  ] Current Cancer                                             2         (within 6 months)  [  ] Immobilization > 24 hrs                              1  [  ] ICU/CCU stay > 24 hours                             1  [  ] Age > 60                                                         1    IMPROVE VTE Score: 1

## 2021-01-13 NOTE — CONSULT NOTE ADULT - SUBJECTIVE AND OBJECTIVE BOX
Asked to see patient with complaint of Right lower extremity swelling and redness since Monday 1/11/21. Pt reports she has long-standing history of lymphedema and has chronic Right greater than Left lower extremity swelling and baseline. Pt reports she uses lymphedema compressive device 2 hrs daily. Pt reports she has DM2 and peripheral neuropathy. Pt reports she has limited mobility due to lymphedema and chronic back pain and ambulates with a cane primarily within in her home. Pt reports associated fevers/chills last pm. Pt reports she started to have drainage from the plantar aspect of her left foot yesterday with increased redness which prompted her to report to ED.    HPI on admission:    Patient is 71 yo F PMHx HTN, DM2, LBBB (w/ neg stress, TTE 6/21 w/ mild AS), lymphedema presents to ED c/o right lower leg pain/swelling and redness for the past 3 days. States redness and swelling got significant worse overnight and subsequently notified her podiatrist Dr. Mohan who recommended ED eval. States she had a previous history of cellulitis about 10 years which required "heavy duty antibiotics." Of note, patient also had a few episodes of NBNB emesis on Friday and Monday but attributes it to a salad she ate which wasn't fresh. At this time, patient is lying in bed, appears comfortable, states pain is well controlled.     In the ED, vitals were temp 98.8, HR 81, /57, RR 16, O2 98%  Labs were significant for WBC 15.83 with L shift, aPTT 26.9, PT 14.6, INR 1.26, glucose 1152, calcium 7.9, albumin 2.7  CXR: Grossly clear lungs  Xray tibia/fibula: Arthritic changes. No evidence of destructive changes below the knees.  Xray foot: Significant soft tissue swelling, predominantly involving the lower leg. No radiographic evidence of osteomyelitis.Suspect linear foreign bodies in the plantar soft tissues at the level of metatarsals as described above.  EKG: SR with 1 st degree AV block, known LBBB, 64bpm  S/p vanco x1, meropenem x1, NS 2200cc bolus, zofran x1 (12 Jan 2021 20:57)   complaint of Patient is a 70y old  Female who presents with a chief complaint of RLE cellulitis, foreign body in foot (13 Jan 2021 11:06)      PAST MEDICAL & SURGICAL HISTORY:  Lymphedema    Diabetes    Hypertension    Fractured skull  1968    H/O Achilles tendon repair  lengthened bilaterally, 2000    Frozen shoulder  2000    History of surgical removal of meniscus of knee  left in 1971    H/O cataract  2020    FAMILY HISTORY:  No pertinent family history in first degree relatives    ( )Tobacco  ( )Etoh  ( )Drugs    penicillins (Hives)      Home Medications:  aspirin 81 mg oral tablet, chewable: 1 tab(s) orally once a day (12 Jan 2021 21:17)  atorvastatin 40 mg oral tablet: 1 tab(s) orally once a day (12 Jan 2021 21:17)  enalapril 20 mg oral tablet: 1 tab(s) orally once a day (12 Jan 2021 21:17)  furosemide 40 mg oral tablet: 1 tab(s) orally once a day (12 Jan 2021 21:17)  Klor-Con 10 mEq oral tablet, extended release: 1 tab(s) orally once a day (12 Jan 2021 21:17)  Lantus: 35 unit(s) subcutaneous once a day (at bedtime) (12 Jan 2021 21:17)  Metoprolol Succinate ER 50 mg oral tablet, extended release: 1 tab(s) orally once a day (12 Jan 2021 21:17)  NovoLOG: patient&#x27;s own sliding scale (12 Jan 2021 21:17)      MEDICATIONS  (STANDING):  atorvastatin 40 milliGRAM(s) Oral at bedtime  dextrose 40% Gel 15 Gram(s) Oral once  dextrose 5%. 1000 milliLiter(s) (50 mL/Hr) IV Continuous <Continuous>  dextrose 5%. 1000 milliLiter(s) (100 mL/Hr) IV Continuous <Continuous>  dextrose 50% Injectable 25 Gram(s) IV Push once  dextrose 50% Injectable 12.5 Gram(s) IV Push once  dextrose 50% Injectable 25 Gram(s) IV Push once  enalapril 20 milliGRAM(s) Oral daily  furosemide    Tablet 40 milliGRAM(s) Oral daily  glucagon  Injectable 1 milliGRAM(s) IntraMuscular once  heparin   Injectable 7500 Unit(s) SubCutaneous every 8 hours  insulin glargine Injectable (LANTUS) 17 Unit(s) SubCutaneous at bedtime  insulin lispro (ADMELOG) corrective regimen sliding scale   SubCutaneous three times a day before meals  insulin lispro (ADMELOG) corrective regimen sliding scale   SubCutaneous at bedtime  linezolid  IVPB 600 milliGRAM(s) IV Intermittent every 12 hours  meropenem  IVPB 1000 milliGRAM(s) IV Intermittent every 8 hours  metoprolol succinate ER 50 milliGRAM(s) Oral daily    MEDICATIONS  (PRN):  acetaminophen   Tablet .. 650 milliGRAM(s) Oral every 6 hours PRN Temp greater or equal to 38C (100.4F)    REVIEW OF SYSTEM:  CONSTITUTIONAL: +weakness, fevers, denies chills  EYES/ENT: No visual changes;  No vertigo or throat pain.  NECK: No pain or stiffness  RESPIRATORY: No cough, wheezing, hemoptysis; No shortness of breath  CARDIOVASCULAR: No chest pain or palpitations  GASTROINTESTINAL: No abdominal or epigastric pain. No nausea, vomiting, or hematemesis; No diarrhea or constipation. No melena or hematochezia.  GENITOURINARY: No dysuria, frequency or hematuria  NEUROLOGICAL: Reports numbness/paresthesias to bilateral lower extremity   SKIN: No itching, burning, rashes, or lesions   All other review of systems is negative unless indicated above.    Allergic/Immunologic:	    Vital Signs Last 24 Hrs  T(C): 38.9 (13 Jan 2021 05:43), Max: 38.9 (13 Jan 2021 05:43)  T(F): 102 (13 Jan 2021 05:43), Max: 102 (13 Jan 2021 05:43)  HR: 66 (13 Jan 2021 05:43) (65 - 81)  BP: 118/56 (13 Jan 2021 05:43) (118/56 - 151/64)  BP(mean): --  RR: 17 (13 Jan 2021 05:43) (16 - 17)  SpO2: 92% (13 Jan 2021 05:43) (92% - 98%)    I&O's Detail    PHYSICAL EXAM:  GENERAL: NAD, well-groomed, well-developed  HEAD:  Atraumatic, Normocephalic  HEART: Regular rate and rhythm; No murmurs, rubs, or gallops  RESPIRATORY: CTA B/L, No W/R/R  ABDOMEN: Soft, Nontender, Nondistended; Bowel sounds present  NEUROLOGY: A&Ox3, nonfocal  Bilateral lower extremities with lymphedematous changes to Right > Left lower extremity  (+) stasis dermatitis to bilateral anterior viktoria  (+) Erythema to plantar aspect of Left foot with open wound over RIght 1st MT  head, +blanching erythema.   Dopplerable signal to DP/PT bilaterally. 3+ pitting edema  Calves soft bilaterally, no ttp                          11.5   18.12 )-----------( 237      ( 13 Jan 2021 08:06 )             35.5       01-13    140  |  102  |  11  ----------------------------<  171<H>  3.2<L>   |  31  |  0.97    Ca    7.9<L>      13 Jan 2021 08:06    TPro  6.8  /  Alb  2.6<L>  /  TBili  0.7  /  DBili  x   /  AST  26  /  ALT  24  /  AlkPhos  76  01-13      PT/INR - ( 12 Jan 2021 17:30 )   PT: 14.6 sec;   INR: 1.26 ratio         PTT - ( 12 Jan 2021 17:30 )  PTT:26.9 sec    LIVER FUNCTIONS - ( 13 Jan 2021 08:06 )  Alb: 2.6 g/dL / Pro: 6.8 g/dL / ALK PHOS: 76 U/L / ALT: 24 U/L / AST: 26 U/L / GGT: x           CAPILLARY BLOOD GLUCOSE      POCT Blood Glucose.: 184 mg/dL (13 Jan 2021 11:37)  POCT Blood Glucose.: 163 mg/dL (12 Jan 2021 23:27)    Radiology Findings:     < from: [facility] Duplex Low Ext Arterial, Ltd, Right (01.13.21 @ 09:56) >    EXAM:  DUPLEX LOW ARTERIES UNI LTD RT                            PROCEDURE DATE:  01/13/2021          INTERPRETATION:  History:Right lower extremity cellulitis    Technique: Grayscale, color ultrasound, spectral analysis of the right limb extremityarteries    Comparison: None    Findings:    The common femoral superficial femoral proximal and mid are patent and triphasic waveforms. Distal portion cannot be seen. Popliteal artery is patent with triphasic waveforms. The posterior tibial could not be seen. Paranasal artery could not be seen. Anterior tibial artery is patent Elevated velocities are seen in the dorsalis pedis up to 194 CM per second    Impression:    Study is limited by the patient's body habitus    There is good inflow and outflow to the knee (though the distal SFA is not well seen)    Flow could only seen in the anterior tibial artery however nonvisualization of the posterior tibial and peroneal arteries may be technical.    Elevated velocities in the dorsalis pedis artery likely reflect underlying hemodynamically significant stenosis      ANUPAM BOYCE MD; Attending Interventional Radiologist  This document has been electronically signed. Jan 13 2021 10:57AM    < end of copied text >      < from: [facility] Physiol Extremity Lower 3+ Level, BI (01.13.21 @ 09:55) >  EXAM:  PHYSIOL EXTREM LOW 3+ LEV BI                            PROCEDURE DATE:  01/13/2021          INTERPRETATION:  History:Cellulitis.    Technique: Bilateral RENEE/PVR    Comparison: None    Findings:    RIGHT  RENEE: 1.1  Flow study: Good flow fromthe high thigh through the great toe. Pulsatile waveforms    LEFT  RENEE: 1.1  Flow study: Good flow from the high thigh through the great toe. Pulsatile waveforms      Impression:    No evidence of large vessel hemodynamically significant lower extremity arterial disease at rest.    Calcified lower extremity arteries    < end of copied text >    Xray foot: Significant soft tissue swelling, predominantly involving the lower leg. No radiographic evidence of osteomyelitis. Suspect linear foreign bodies in the plantar soft tissues at the level of metatarsals as described above.    Patient is 71 yo F PMHx HTN, DM2, LBBB (w/ neg stress, TTE 6/21 w/ mild AS), lymphedema (chronic) with Right lower extremity cellulitis and Right plantar foreign body necessitating removal and debridement per podiatry    -Arterial Doppler technically difficult due to lymphedema and profound swelling however patient has bi-phasic signals at DP and PT bilaterally and Non-invasive vascular assessment with RENEE's within normal limits  -RENEE's within normal limits bilaterally  -Continue IV Zyvox and Meropenum per ID  -Continue elevation and consider SCD's/compression stockings for lymphedema   -No vascular contraindication to planned procedure per podiatry  -Discussed with Dr. Collado

## 2021-01-13 NOTE — CONSULT NOTE ADULT - SUBJECTIVE AND OBJECTIVE BOX
[facility], Division of Infectious Diseases  JOE Slaughter, DIMITRIS Garcia  [phone]    EVELYN LOPEZ  70y, Female  [id number]    HPI--  HPI:  Patient is 69 yo F PMHx HTN, DM2, LBBB (w/ neg stress, TTE 6/21 w/ mild AS), lymphedema presents to ED c/o right lower leg pain/swelling and redness for the past 3 days. States redness and swelling got significant worse overnight and subsequently notified her podiatrist Dr. Mohan who recommended ED eval. States she had a previous history of cellulitis about 10 years which required "heavy duty antibiotics." Of note, patient also had a few episodes of NBNB emesis on Friday and Monday but attributes it to a salad she ate which wasn't fresh. At this time, patient is lying in bed, appears comfortable, states pain is well controlled.     In the ED, vitals were temp 98.8, HR 81, /57, RR 16, O2 98%  Labs were significant for WBC 15.83 with L shift, aPTT 26.9, PT 14.6, INR 1.26, glucose 1152, calcium 7.9, albumin 2.7  CXR: Grossly clear lungs  Xray tibia/fibula: Arthritic changes. No evidence of destructive changes below the knees.  Xray foot: Significant soft tissue swelling, predominantly involving the lower leg. No radiographic evidence of osteomyelitis.Suspect linear foreign bodies in the plantar soft tissues at the level of metatarsals as described above.  EKG: SR with 1 st degree AV block, known LBBB, 64bpm  S/p vanco x1, meropenem x1, NS 2200cc bolus, zofran x1 (12 Jan 2021 20:57)    Pt to be seen and examined at bedside...    Active Medications--  acetaminophen   Tablet .. 650 milliGRAM(s) Oral every 6 hours PRN  atorvastatin 40 milliGRAM(s) Oral at bedtime  dextrose 40% Gel 15 Gram(s) Oral once  dextrose 5%. 1000 milliLiter(s) IV Continuous <Continuous>  dextrose 5%. 1000 milliLiter(s) IV Continuous <Continuous>  dextrose 50% Injectable 25 Gram(s) IV Push once  dextrose 50% Injectable 12.5 Gram(s) IV Push once  dextrose 50% Injectable 25 Gram(s) IV Push once  enalapril 20 milliGRAM(s) Oral daily  enoxaparin Injectable 40 milliGRAM(s) SubCutaneous every 12 hours  furosemide    Tablet 40 milliGRAM(s) Oral daily  glucagon  Injectable 1 milliGRAM(s) IntraMuscular once  insulin glargine Injectable (LANTUS) 17 Unit(s) SubCutaneous at bedtime  insulin lispro (ADMELOG) corrective regimen sliding scale   SubCutaneous three times a day before meals  insulin lispro (ADMELOG) corrective regimen sliding scale   SubCutaneous at bedtime  linezolid  IVPB 600 milliGRAM(s) IV Intermittent every 12 hours  meropenem  IVPB 1000 milliGRAM(s) IV Intermittent every 8 hours  metoprolol succinate ER 50 milliGRAM(s) Oral daily  potassium chloride    Tablet ER 40 milliEquivalent(s) Oral once    Antimicrobials:   linezolid  IVPB 600 milliGRAM(s) IV Intermittent every 12 hours  meropenem  IVPB 1000 milliGRAM(s) IV Intermittent every 8 hours    Immunologic:     ROS:  CONSTITUTIONAL: No fevers or chills. No weakness or headache. No weight changes.  EYES/ENT: No visual or hearing changes. No sore throat or throat pain .  NECK: No pain or stiffness  RESPIRATORY: No cough, wheezing, or hemoptysis. No shortness of breath  CARDIOVASCULAR: No chest pain or palpitations  GASTROINTESTINAL: No abdominal pain. No nausea or vomiting. No diarrhea or constipation.  GENITOURINARY: No dysuria, frequency or hematuria  NEUROLOGICAL: No numbness or weakness  SKIN: No itching or rashes  PSYCHIATRIC: Pleasant. Appropriate affect    Allergies: penicillins (Hives)    PMH -- Lymphedema    Diabetes    Hypertension      PSH -- Fractured skull    H/O Achilles tendon repair    Frozen shoulder    History of surgical removal of meniscus of knee    H/O cataract      FH -- No pertinent family history in first degree relatives      Social History --  EtOH: denies   Tobacco: denies   Drug Use: denies     Travel/Environmental/Occupational History:    Physical Exam--  Vital Signs Last 24 Hrs  T(F): 102 (13 Jan 2021 05:43), Max: 102 (13 Jan 2021 05:43)  HR: 66 (13 Jan 2021 05:43) (65 - 81)  BP: 118/56 (13 Jan 2021 05:43) (118/56 - 151/64)  RR: 17 (13 Jan 2021 05:43) (16 - 17)  SpO2: 92% (13 Jan 2021 05:43) (92% - 98%)  General: nontoxic-appearing, no acute distress  HEENT: NC/AT, EOMI, anicteric, conjunctiva pink and moist, oropharynx clear, dentition fair  Neck: Not rigid. No sense of mass. No LAD  Lungs: Clear bilaterally without rales, wheezing or rhonchi  Heart: Regular rate and rhythm. No murmur, rub or gallop.  Abdomen: Soft. Nondistended. Nontender. Bowel sounds present. No organomegaly.  Back: No spinal tenderness. No costovertebral angle tenderness.  Extremities: No cyanosis or clubbing. No edema.   Skin: Warm. Dry. Good turgor. No rash. No vasculitic stigmata.      Laboratory & Imaging Data--  CBC:                       11.5   18.12 )-----------( 237      ( 13 Jan 2021 08:06 )             35.5     CMP: 01-13    140  |  102  |  11  ----------------------------<  171<H>  3.2<L>   |  31  |  0.97    Ca    7.9<L>      13 Jan 2021 08:06    TPro  6.8  /  Alb  2.6<L>  /  TBili  0.7  /  DBili  x   /  AST  26  /  ALT  24  /  AlkPhos  76  01-13    LIVER FUNCTIONS - ( 13 Jan 2021 08:06 )  Alb: 2.6 g/dL / Pro: 6.8 g/dL / ALK PHOS: 76 U/L / ALT: 24 U/L / AST: 26 U/L / GGT: x               Microbiology: reviewed  < from: [facility] Duplex Low Ext Arterial, Ltd, Right (01.13.21 @ 09:56) >    EXAM:  DUPLEX LOW ARTERIES UNI LTD RT                            PROCEDURE DATE:  01/13/2021          INTERPRETATION:  History:Right lower extremity cellulitis    Technique: Grayscale, color ultrasound, spectral analysis of the right limb extremityarteries    Comparison: None    Findings:    The common femoral superficial femoral proximal and mid are patent and triphasic waveforms. Distal portion cannot be seen. Popliteal artery is patent with triphasic waveforms. The posterior tibial could not be seen. Paranasal artery could not be seen. Anterior tibial artery is patent Elevated velocities are seen in the dorsalis pedis up to 194 CM per second    Impression:    Study is limited by the patient's body habitus    There is good inflow and outflow to the knee (though the distal SFA is not well seen)    Flow could only seen in the anterior tibial artery however nonvisualization of the posterior tibial and peroneal arteries may be technical.    Elevated velocities in the dorsalis pedis artery likely reflect underlying hemodynamically significant stenosis            ANUPAM BOYCE MD; Attending Interventional Radiologist  This document has been electronically signed. Jan 13 2021 10:57AM    < end of copied text >      Radiology: reviewed     [facility], Division of Infectious Diseases  JOE Slaughter, DIMITRIS Garcia  [phone]    EVELYN LOPEZ  70y, Female  [id number]    HPI--  HPI:  Patient is 69 yo F PMHx HTN, DM2, LBBB (w/ neg stress, TTE 6/21 w/ mild AS), lymphedema presents to ED c/o right lower leg pain/swelling and redness for the past 3 days. States redness and swelling got significant worse overnight and subsequently notified her podiatrist Dr. Mohan who recommended ED eval. States she had a previous history of cellulitis about 10 years which required "heavy duty antibiotics." Of note, patient also had a few episodes of NBNB emesis on Friday and Monday but attributes it to a salad she ate which wasn't fresh. At this time, patient is lying in bed, appears comfortable, states pain is well controlled.     In the ED, vitals were temp 98.8, HR 81, /57, RR 16, O2 98%  Labs were significant for WBC 15.83 with L shift, aPTT 26.9, PT 14.6, INR 1.26, glucose 1152, calcium 7.9, albumin 2.7  CXR: Grossly clear lungs  Xray tibia/fibula: Arthritic changes. No evidence of destructive changes below the knees.  Xray foot: Significant soft tissue swelling, predominantly involving the lower leg. No radiographic evidence of osteomyelitis.Suspect linear foreign bodies in the plantar soft tissues at the level of metatarsals as described above.  EKG: SR with 1 st degree AV block, known LBBB, 64bpm  S/p vanco x1, meropenem x1, NS 2200cc bolus, zofran x1 (12 Jan 2021 20:57)    Pt seen and examined at bedside. b/l LE lymphedema and erythema, improved since admission per pt.     Active Medications--  acetaminophen   Tablet .. 650 milliGRAM(s) Oral every 6 hours PRN  atorvastatin 40 milliGRAM(s) Oral at bedtime  dextrose 40% Gel 15 Gram(s) Oral once  dextrose 5%. 1000 milliLiter(s) IV Continuous <Continuous>  dextrose 5%. 1000 milliLiter(s) IV Continuous <Continuous>  dextrose 50% Injectable 25 Gram(s) IV Push once  dextrose 50% Injectable 12.5 Gram(s) IV Push once  dextrose 50% Injectable 25 Gram(s) IV Push once  enalapril 20 milliGRAM(s) Oral daily  enoxaparin Injectable 40 milliGRAM(s) SubCutaneous every 12 hours  furosemide    Tablet 40 milliGRAM(s) Oral daily  glucagon  Injectable 1 milliGRAM(s) IntraMuscular once  insulin glargine Injectable (LANTUS) 17 Unit(s) SubCutaneous at bedtime  insulin lispro (ADMELOG) corrective regimen sliding scale   SubCutaneous three times a day before meals  insulin lispro (ADMELOG) corrective regimen sliding scale   SubCutaneous at bedtime  linezolid  IVPB 600 milliGRAM(s) IV Intermittent every 12 hours  meropenem  IVPB 1000 milliGRAM(s) IV Intermittent every 8 hours  metoprolol succinate ER 50 milliGRAM(s) Oral daily  potassium chloride    Tablet ER 40 milliEquivalent(s) Oral once    Antimicrobials:   linezolid  IVPB 600 milliGRAM(s) IV Intermittent every 12 hours  meropenem  IVPB 1000 milliGRAM(s) IV Intermittent every 8 hours    Immunologic:     ROS:  CONSTITUTIONAL: No fevers or chills. No weakness or headache. No weight changes.  EYES/ENT: No visual or hearing changes. No sore throat or throat pain .  NECK: No pain or stiffness  RESPIRATORY: No cough, wheezing, or hemoptysis. No shortness of breath  CARDIOVASCULAR: No chest pain or palpitations  GASTROINTESTINAL: No abdominal pain. No nausea or vomiting. No diarrhea or constipation.  GENITOURINARY: No dysuria, frequency or hematuria  NEUROLOGICAL: No numbness or weakness  SKIN: No itching or rashes  PSYCHIATRIC: Pleasant. Appropriate affect    Allergies: penicillins (Hives)    PMH -- Lymphedema    Diabetes    Hypertension      PSH -- Fractured skull    H/O Achilles tendon repair    Frozen shoulder    History of surgical removal of meniscus of knee    H/O cataract      FH -- No pertinent family history in first degree relatives      Social History --  EtOH: denies   Tobacco: denies   Drug Use: denies     Travel/Environmental/Occupational History:    Physical Exam--  Vital Signs Last 24 Hrs  T(F): 102 (13 Jan 2021 05:43), Max: 102 (13 Jan 2021 05:43)  HR: 66 (13 Jan 2021 05:43) (65 - 81)  BP: 118/56 (13 Jan 2021 05:43) (118/56 - 151/64)  RR: 17 (13 Jan 2021 05:43) (16 - 17)  SpO2: 92% (13 Jan 2021 05:43) (92% - 98%)  General: nontoxic-appearing, no acute distress  HEENT: NC/AT, EOMI, anicteric, conjunctiva pink and moist, oropharynx clear, dentition fair  Neck: Not rigid. No sense of mass. No LAD  Lungs: Clear bilaterally without rales, wheezing or rhonchi  Heart: Regular rate and rhythm. No murmur, rub or gallop.  Abdomen: Soft. Nondistended. Nontender. Bowel sounds present. No organomegaly.  Back: No spinal tenderness. No costovertebral angle tenderness.  Extremities: No cyanosis or clubbing. No edema.   Skin: Warm. Dry. Good turgor. No rash. No vasculitic stigmata.      Laboratory & Imaging Data--  CBC:                       11.5   18.12 )-----------( 237      ( 13 Jan 2021 08:06 )             35.5     CMP: 01-13    140  |  102  |  11  ----------------------------<  171<H>  3.2<L>   |  31  |  0.97    Ca    7.9<L>      13 Jan 2021 08:06    TPro  6.8  /  Alb  2.6<L>  /  TBili  0.7  /  DBili  x   /  AST  26  /  ALT  24  /  AlkPhos  76  01-13    LIVER FUNCTIONS - ( 13 Jan 2021 08:06 )  Alb: 2.6 g/dL / Pro: 6.8 g/dL / ALK PHOS: 76 U/L / ALT: 24 U/L / AST: 26 U/L / GGT: x               Microbiology: reviewed  < from: [facility] Duplex Low Ext Arterial, Ltd, Right (01.13.21 @ 09:56) >    EXAM:  DUPLEX LOW ARTERIES UNI LTD RT                            PROCEDURE DATE:  01/13/2021          INTERPRETATION:  History:Right lower extremity cellulitis    Technique: Grayscale, color ultrasound, spectral analysis of the right limb extremityarteries    Comparison: None    Findings:    The common femoral superficial femoral proximal and mid are patent and triphasic waveforms. Distal portion cannot be seen. Popliteal artery is patent with triphasic waveforms. The posterior tibial could not be seen. Paranasal artery could not be seen. Anterior tibial artery is patent Elevated velocities are seen in the dorsalis pedis up to 194 CM per second    Impression:    Study is limited by the patient's body habitus    There is good inflow and outflow to the knee (though the distal SFA is not well seen)    Flow could only seen in the anterior tibial artery however nonvisualization of the posterior tibial and peroneal arteries may be technical.    Elevated velocities in the dorsalis pedis artery likely reflect underlying hemodynamically significant stenosis            ANUPAM BOYCE MD; Attending Interventional Radiologist  This document has been electronically signed. Jan 13 2021 10:57AM    < end of copied text >      Radiology: reviewed     [facility], Division of Infectious Diseases  JOE Slaughter, DIMITRIS Garcia  [phone]    EVELYN LOPEZ  70y, Female  [id number]    HPI--  HPI:  Patient is 71 yo F PMHx HTN, DM2, LBBB (w/ neg stress, TTE 6/21 w/ mild AS), lymphedema presents to ED c/o right lower leg pain/swelling and redness for the past 3 days. States redness and swelling got significant worse overnight and subsequently notified her podiatrist Dr. Mohan who recommended ED eval. States she had a previous history of cellulitis about 10 years which required "heavy duty antibiotics." Of note, patient also had a few episodes of NBNB emesis on Friday and Monday but attributes it to a salad she ate which wasn't fresh. At this time, patient is lying in bed, appears comfortable, states pain is well controlled.     In the ED, vitals were temp 98.8, HR 81, /57, RR 16, O2 98%  Labs were significant for WBC 15.83 with L shift, aPTT 26.9, PT 14.6, INR 1.26, glucose 1152, calcium 7.9, albumin 2.7  CXR: Grossly clear lungs  Xray tibia/fibula: Arthritic changes. No evidence of destructive changes below the knees.  Xray foot: Significant soft tissue swelling, predominantly involving the lower leg. No radiographic evidence of osteomyelitis.Suspect linear foreign bodies in the plantar soft tissues at the level of metatarsals as described above.  EKG: SR with 1 st degree AV block, known LBBB, 64bpm  S/p vanco x1, meropenem x1, NS 2200cc bolus, zofran x1 (12 Jan 2021 20:57)    Pt seen and examined at bedside. b/l LE lymphedema and erythema, improved since admission per pt.   Febrile to 102F this AM and uptrending leukocytosis 15 -->18    Active Medications--  acetaminophen   Tablet .. 650 milliGRAM(s) Oral every 6 hours PRN  atorvastatin 40 milliGRAM(s) Oral at bedtime  dextrose 40% Gel 15 Gram(s) Oral once  dextrose 5%. 1000 milliLiter(s) IV Continuous <Continuous>  dextrose 5%. 1000 milliLiter(s) IV Continuous <Continuous>  dextrose 50% Injectable 25 Gram(s) IV Push once  dextrose 50% Injectable 12.5 Gram(s) IV Push once  dextrose 50% Injectable 25 Gram(s) IV Push once  enalapril 20 milliGRAM(s) Oral daily  enoxaparin Injectable 40 milliGRAM(s) SubCutaneous every 12 hours  furosemide    Tablet 40 milliGRAM(s) Oral daily  glucagon  Injectable 1 milliGRAM(s) IntraMuscular once  insulin glargine Injectable (LANTUS) 17 Unit(s) SubCutaneous at bedtime  insulin lispro (ADMELOG) corrective regimen sliding scale   SubCutaneous three times a day before meals  insulin lispro (ADMELOG) corrective regimen sliding scale   SubCutaneous at bedtime  linezolid  IVPB 600 milliGRAM(s) IV Intermittent every 12 hours  meropenem  IVPB 1000 milliGRAM(s) IV Intermittent every 8 hours  metoprolol succinate ER 50 milliGRAM(s) Oral daily  potassium chloride    Tablet ER 40 milliEquivalent(s) Oral once    Antimicrobials:   linezolid  IVPB 600 milliGRAM(s) IV Intermittent every 12 hours  meropenem  IVPB 1000 milliGRAM(s) IV Intermittent every 8 hours    Immunologic:     ROS:  CONSTITUTIONAL: No fevers or chills. No weakness or headache. No weight changes.  EYES/ENT: No visual or hearing changes. No sore throat or throat pain .  NECK: No pain or stiffness  RESPIRATORY: No cough, wheezing, or hemoptysis. No shortness of breath  CARDIOVASCULAR: No chest pain or palpitations  GASTROINTESTINAL: No abdominal pain. No nausea or vomiting. No diarrhea or constipation.  GENITOURINARY: No dysuria, frequency or hematuria  NEUROLOGICAL: No numbness or weakness  SKIN: No itching or rashes  PSYCHIATRIC: Pleasant. Appropriate affect    Allergies: penicillins (Hives)    PMH -- Lymphedema    Diabetes    Hypertension      PSH -- Fractured skull    H/O Achilles tendon repair    Frozen shoulder    History of surgical removal of meniscus of knee    H/O cataract      FH -- No pertinent family history in first degree relatives      Social History --  EtOH: denies   Tobacco: denies   Drug Use: denies     Travel/Environmental/Occupational History:    Physical Exam--  Vital Signs Last 24 Hrs  T(F): 102 (13 Jan 2021 05:43), Max: 102 (13 Jan 2021 05:43)  HR: 66 (13 Jan 2021 05:43) (65 - 81)  BP: 118/56 (13 Jan 2021 05:43) (118/56 - 151/64)  RR: 17 (13 Jan 2021 05:43) (16 - 17)  SpO2: 92% (13 Jan 2021 05:43) (92% - 98%)  General: nontoxic-appearing, no acute distress  HEENT: NC/AT, EOMI, anicteric, conjunctiva pink and moist, oropharynx clear, dentition fair  Neck: Not rigid. No sense of mass. No LAD  Lungs: Clear bilaterally without rales, wheezing or rhonchi  Heart: Regular rate and rhythm. No murmur, rub or gallop.  Abdomen: Soft. Nondistended. Nontender. Bowel sounds present. No organomegaly.  Back: No spinal tenderness. No costovertebral angle tenderness.  Extremities: No cyanosis or clubbing. No edema.   Skin: Warm. Dry. Good turgor. No rash. No vasculitic stigmata.      Laboratory & Imaging Data--  CBC:                       11.5   18.12 )-----------( 237      ( 13 Jan 2021 08:06 )             35.5     CMP: 01-13    140  |  102  |  11  ----------------------------<  171<H>  3.2<L>   |  31  |  0.97    Ca    7.9<L>      13 Jan 2021 08:06    TPro  6.8  /  Alb  2.6<L>  /  TBili  0.7  /  DBili  x   /  AST  26  /  ALT  24  /  AlkPhos  76  01-13    LIVER FUNCTIONS - ( 13 Jan 2021 08:06 )  Alb: 2.6 g/dL / Pro: 6.8 g/dL / ALK PHOS: 76 U/L / ALT: 24 U/L / AST: 26 U/L / GGT: x               Microbiology: reviewed  < from: [facility] Duplex Low Ext Arterial, Ltd, Right (01.13.21 @ 09:56) >    EXAM:  DUPLEX LOW ARTERIES UNI LTD RT                            PROCEDURE DATE:  01/13/2021          INTERPRETATION:  History:Right lower extremity cellulitis    Technique: Grayscale, color ultrasound, spectral analysis of the right limb extremityarteries    Comparison: None    Findings:    The common femoral superficial femoral proximal and mid are patent and triphasic waveforms. Distal portion cannot be seen. Popliteal artery is patent with triphasic waveforms. The posterior tibial could not be seen. Paranasal artery could not be seen. Anterior tibial artery is patent Elevated velocities are seen in the dorsalis pedis up to 194 CM per second    Impression:    Study is limited by the patient's body habitus    There is good inflow and outflow to the knee (though the distal SFA is not well seen)    Flow could only seen in the anterior tibial artery however nonvisualization of the posterior tibial and peroneal arteries may be technical.    Elevated velocities in the dorsalis pedis artery likely reflect underlying hemodynamically significant stenosis            ANUPAM BOYCE MD; Attending Interventional Radiologist  This document has been electronically signed. Jan 13 2021 10:57AM    < end of copied text >      Radiology: reviewed

## 2021-01-13 NOTE — PROGRESS NOTE ADULT - SUBJECTIVE AND OBJECTIVE BOX
70y year old Female seen at [facility] 1EAS 112 W1 for cellulites right foot.  Pt's did not have dressing on right foot. Pt said she had a fever early in the AM today and denied chills, nausea, vomiting, chest pain, shortness of breath, or calf pain at this time.        HPI:  70y year old Female seen at [facility] ED for right foot cellulitis.  Pt said last night 1/11/21 she noticed her right foot medial arch was red and this morning in the AM the entire foot was swollen and the redness was now on the dorsal forefoot.  Pt said she has mild pain on the plantar and dorsal forefoot.  Pt said on 1/8/21 she vomited 3-4 times, 1/11/21 3-4 times and 2 times today.  Denies any fever, chills, nausea, vomiting, chest pain, shortness of breath, or calf pain at this time.        Allergies    penicillins (Hives)    Intolerances        MEDICATIONS  (STANDING):  atorvastatin 40 milliGRAM(s) Oral at bedtime  dextrose 40% Gel 15 Gram(s) Oral once  dextrose 5%. 1000 milliLiter(s) (50 mL/Hr) IV Continuous <Continuous>  dextrose 5%. 1000 milliLiter(s) (100 mL/Hr) IV Continuous <Continuous>  dextrose 50% Injectable 25 Gram(s) IV Push once  dextrose 50% Injectable 12.5 Gram(s) IV Push once  dextrose 50% Injectable 25 Gram(s) IV Push once  enalapril 20 milliGRAM(s) Oral daily  furosemide    Tablet 40 milliGRAM(s) Oral daily  glucagon  Injectable 1 milliGRAM(s) IntraMuscular once  heparin   Injectable 7500 Unit(s) SubCutaneous every 8 hours  insulin glargine Injectable (LANTUS) 17 Unit(s) SubCutaneous at bedtime  insulin lispro (ADMELOG) corrective regimen sliding scale   SubCutaneous three times a day before meals  insulin lispro (ADMELOG) corrective regimen sliding scale   SubCutaneous at bedtime  linezolid  IVPB 600 milliGRAM(s) IV Intermittent every 12 hours  meropenem  IVPB 1000 milliGRAM(s) IV Intermittent every 8 hours  metoprolol succinate ER 50 milliGRAM(s) Oral daily    MEDICATIONS  (PRN):  acetaminophen   Tablet .. 650 milliGRAM(s) Oral every 6 hours PRN Temp greater or equal to 38C (100.4F)      Vital Signs Last 24 Hrs  T(C): 36.6 (13 Jan 2021 13:24), Max: 38.9 (13 Jan 2021 05:43)  T(F): 97.9 (13 Jan 2021 13:24), Max: 102 (13 Jan 2021 05:43)  HR: 67 (13 Jan 2021 13:24) (65 - 74)  BP: 146/55 (13 Jan 2021 13:24) (118/56 - 151/64)  BP(mean): --  RR: 17 (13 Jan 2021 13:24) (16 - 17)  SpO2: 91% (13 Jan 2021 13:24) (91% - 97%)    PHYSICAL EXAM:  Vascular: DP & PT nonpalpable bilaterally due to edema, Capillary refill 3 seconds.  Nonpitting edema to leg b/l.  2+ pitting edema to dorsal foot b/l  Neurological: Light touch sensation intact bilaterally  Musculoskeletal: POP plantar right forefoot   Dermatological: Wound (1)  Right foot sub 1st met head puncture wound.  Approximately 0.1 cm annular.  0.5 cc white purulence expressed.  PTB(+)      CBC Full  -  ( 13 Jan 2021 08:06 )  WBC Count : 18.12 K/uL  RBC Count : 3.97 M/uL  Hemoglobin : 11.5 g/dL  Hematocrit : 35.5 %  Platelet Count - Automated : 237 K/uL  Mean Cell Volume : 89.4 fl  Mean Cell Hemoglobin : 29.0 pg  Mean Cell Hemoglobin Concentration : 32.4 gm/dL  Auto Neutrophil # : 15.40 K/uL  Auto Lymphocyte # : 0.36 K/uL  Auto Monocyte # : 2.36 K/uL  Auto Eosinophil # : 0.00 K/uL  Auto Basophil # : 0.00 K/uL  Auto Neutrophil % : 83.0 %  Auto Lymphocyte % : 2.0 %  Auto Monocyte % : 13.0 %  Auto Eosinophil % : 0.0 %  Auto Basophil % : 0.0 %      ----------CHEM PANEL----------    PT/INR - ( 12 Jan 2021 17:30 )   PT: 14.6 sec;   INR: 1.26 ratio         PTT - ( 12 Jan 2021 17:30 )  PTT:26.9 sec        Imaging: ----------

## 2021-01-13 NOTE — PROGRESS NOTE ADULT - SUBJECTIVE AND OBJECTIVE BOX
Patient is a 70y old  Female who presents with a chief complaint of RLE cellulitis, foreign body in foot (13 Jan 2021 12:44)    INTERVAL HPI/OVERNIGHT EVENTS: Patient seen and examined at bedside. Pt febrile overnight to 102F. Patient says that she noted her right foot had enlarged in size and become more erythematous. Pt admits to have vomiting Prior to admission, but not during her hospitalization. Denies fevers, chills, headache, lightheadedness, chest pain, dyspnea, abdominal pain, n/v/d/c.    MEDICATIONS  (STANDING):  atorvastatin 40 milliGRAM(s) Oral at bedtime  dextrose 40% Gel 15 Gram(s) Oral once  dextrose 5%. 1000 milliLiter(s) (50 mL/Hr) IV Continuous <Continuous>  dextrose 5%. 1000 milliLiter(s) (100 mL/Hr) IV Continuous <Continuous>  dextrose 50% Injectable 25 Gram(s) IV Push once  dextrose 50% Injectable 12.5 Gram(s) IV Push once  dextrose 50% Injectable 25 Gram(s) IV Push once  enalapril 20 milliGRAM(s) Oral daily  furosemide    Tablet 40 milliGRAM(s) Oral daily  glucagon  Injectable 1 milliGRAM(s) IntraMuscular once  heparin   Injectable 7500 Unit(s) SubCutaneous every 8 hours  insulin glargine Injectable (LANTUS) 17 Unit(s) SubCutaneous at bedtime  insulin lispro (ADMELOG) corrective regimen sliding scale   SubCutaneous three times a day before meals  insulin lispro (ADMELOG) corrective regimen sliding scale   SubCutaneous at bedtime  linezolid  IVPB 600 milliGRAM(s) IV Intermittent every 12 hours  meropenem  IVPB 1000 milliGRAM(s) IV Intermittent every 8 hours  metoprolol succinate ER 50 milliGRAM(s) Oral daily    MEDICATIONS  (PRN):  acetaminophen   Tablet .. 650 milliGRAM(s) Oral every 6 hours PRN Temp greater or equal to 38C (100.4F)      Allergies    penicillins (Hives)    Intolerances        REVIEW OF SYSTEMS:  CONSTITUTIONAL: No fever or chills  HEENT:  No headache, no sore throat  RESPIRATORY: No cough, wheezing, or shortness of breath  CARDIOVASCULAR: No chest pain, palpitations  GASTROINTESTINAL: No abd pain, nausea, vomiting, or diarrhea  GENITOURINARY: No dysuria, frequency, or hematuria  NEUROLOGICAL: no focal weakness or dizziness  MUSCULOSKELETAL: Admits to nonpainful R foot and calf swelling, no myalgias     Vital Signs Last 24 Hrs  T(C): 36.6 (13 Jan 2021 13:24), Max: 38.9 (13 Jan 2021 05:43)  T(F): 97.9 (13 Jan 2021 13:24), Max: 102 (13 Jan 2021 05:43)  HR: 67 (13 Jan 2021 13:24) (65 - 81)  BP: 146/55 (13 Jan 2021 13:24) (118/56 - 151/64)  BP(mean): --  RR: 17 (13 Jan 2021 13:24) (16 - 17)  SpO2: 91% (13 Jan 2021 13:24) (91% - 98%)    PHYSICAL EXAM:  GENERAL: NAD, obese female  HEENT:  anicteric, moist mucous membranes  CHEST/LUNG:  CTA b/l, no rales, wheezes, or rhonchi  HEART:  RRR, S1, S2  ABDOMEN:  BS+, soft, nontender, nondistended  EXTREMITIES: +RLE erythematous with 3+ pitting edema, RLE warm to touch in comparison to LLE, R calf larger in size than left, LLE with 1+ pitting edema, with b/l LE chronic venous stasis changes, unable to palpate b/l distal pulses, no tenderness     NERVOUS SYSTEM: answers questions and follows commands appropriately    LABS:                        11.5   18.12 )-----------( 237      ( 13 Jan 2021 08:06 )             35.5     CBC Full  -  ( 13 Jan 2021 08:06 )  WBC Count : 18.12 K/uL  Hemoglobin : 11.5 g/dL  Hematocrit : 35.5 %  Platelet Count - Automated : 237 K/uL  Mean Cell Volume : 89.4 fl  Mean Cell Hemoglobin : 29.0 pg  Mean Cell Hemoglobin Concentration : 32.4 gm/dL  Auto Neutrophil # : 15.40 K/uL  Auto Lymphocyte # : 0.36 K/uL  Auto Monocyte # : 2.36 K/uL  Auto Eosinophil # : 0.00 K/uL  Auto Basophil # : 0.00 K/uL  Auto Neutrophil % : 83.0 %  Auto Lymphocyte % : 2.0 %  Auto Monocyte % : 13.0 %  Auto Eosinophil % : 0.0 %  Auto Basophil % : 0.0 %    13 Jan 2021 08:06    140    |  102    |  11     ----------------------------<  171    3.2     |  31     |  0.97     Ca    7.9        13 Jan 2021 08:06    TPro  6.8    /  Alb  2.6    /  TBili  0.7    /  DBili  x      /  AST  26     /  ALT  24     /  AlkPhos  76     13 Jan 2021 08:06    PT/INR - ( 12 Jan 2021 17:30 )   PT: 14.6 sec;   INR: 1.26 ratio         PTT - ( 12 Jan 2021 17:30 )  PTT:26.9 sec    CAPILLARY BLOOD GLUCOSE      POCT Blood Glucose.: 184 mg/dL (13 Jan 2021 11:37)  POCT Blood Glucose.: 163 mg/dL (12 Jan 2021 23:27)          RADIOLOGY & ADDITIONAL TESTS:    Personally reviewed.     Consultant(s) Notes Reviewed:  [x] YES  [ ] NO

## 2021-01-14 ENCOUNTER — TRANSCRIPTION ENCOUNTER (OUTPATIENT)
Age: 71
End: 2021-01-14

## 2021-01-14 LAB
-  AMPICILLIN/SULBACTAM: SIGNIFICANT CHANGE UP
-  CEFAZOLIN: SIGNIFICANT CHANGE UP
-  CLINDAMYCIN: SIGNIFICANT CHANGE UP
-  ERYTHROMYCIN: SIGNIFICANT CHANGE UP
-  GENTAMICIN: SIGNIFICANT CHANGE UP
-  OXACILLIN: SIGNIFICANT CHANGE UP
-  RIFAMPIN: SIGNIFICANT CHANGE UP
-  TETRACYCLINE: SIGNIFICANT CHANGE UP
-  TRIMETHOPRIM/SULFAMETHOXAZOLE: SIGNIFICANT CHANGE UP
-  VANCOMYCIN: SIGNIFICANT CHANGE UP
ALBUMIN SERPL ELPH-MCNC: 2.4 G/DL — LOW (ref 3.3–5)
ALP SERPL-CCNC: 82 U/L — SIGNIFICANT CHANGE UP (ref 40–120)
ALT FLD-CCNC: 23 U/L — SIGNIFICANT CHANGE UP (ref 12–78)
ANION GAP SERPL CALC-SCNC: 6 MMOL/L — SIGNIFICANT CHANGE UP (ref 5–17)
AST SERPL-CCNC: 23 U/L — SIGNIFICANT CHANGE UP (ref 15–37)
BASOPHILS # BLD AUTO: 0.06 K/UL — SIGNIFICANT CHANGE UP (ref 0–0.2)
BASOPHILS NFR BLD AUTO: 0.5 % — SIGNIFICANT CHANGE UP (ref 0–2)
BILIRUB SERPL-MCNC: 0.6 MG/DL — SIGNIFICANT CHANGE UP (ref 0.2–1.2)
BUN SERPL-MCNC: 17 MG/DL — SIGNIFICANT CHANGE UP (ref 7–23)
CALCIUM SERPL-MCNC: 7.9 MG/DL — LOW (ref 8.5–10.1)
CHLORIDE SERPL-SCNC: 98 MMOL/L — SIGNIFICANT CHANGE UP (ref 96–108)
CO2 SERPL-SCNC: 30 MMOL/L — SIGNIFICANT CHANGE UP (ref 22–31)
CREAT SERPL-MCNC: 1.2 MG/DL — SIGNIFICANT CHANGE UP (ref 0.5–1.3)
CULTURE RESULTS: SIGNIFICANT CHANGE UP
EOSINOPHIL # BLD AUTO: 0.41 K/UL — SIGNIFICANT CHANGE UP (ref 0–0.5)
EOSINOPHIL NFR BLD AUTO: 3.6 % — SIGNIFICANT CHANGE UP (ref 0–6)
GLUCOSE SERPL-MCNC: 301 MG/DL — HIGH (ref 70–99)
HCT VFR BLD CALC: 36.7 % — SIGNIFICANT CHANGE UP (ref 34.5–45)
HGB BLD-MCNC: 11.7 G/DL — SIGNIFICANT CHANGE UP (ref 11.5–15.5)
IMM GRANULOCYTES NFR BLD AUTO: 0.4 % — SIGNIFICANT CHANGE UP (ref 0–1.5)
LYMPHOCYTES # BLD AUTO: 1.05 K/UL — SIGNIFICANT CHANGE UP (ref 1–3.3)
LYMPHOCYTES # BLD AUTO: 9.2 % — LOW (ref 13–44)
MCHC RBC-ENTMCNC: 29 PG — SIGNIFICANT CHANGE UP (ref 27–34)
MCHC RBC-ENTMCNC: 31.9 GM/DL — LOW (ref 32–36)
MCV RBC AUTO: 91.1 FL — SIGNIFICANT CHANGE UP (ref 80–100)
METHOD TYPE: SIGNIFICANT CHANGE UP
MONOCYTES # BLD AUTO: 1.16 K/UL — HIGH (ref 0–0.9)
MONOCYTES NFR BLD AUTO: 10.2 % — SIGNIFICANT CHANGE UP (ref 2–14)
NEUTROPHILS # BLD AUTO: 8.66 K/UL — HIGH (ref 1.8–7.4)
NEUTROPHILS NFR BLD AUTO: 76.1 % — SIGNIFICANT CHANGE UP (ref 43–77)
NRBC # BLD: 0 /100 WBCS — SIGNIFICANT CHANGE UP (ref 0–0)
ORGANISM # SPEC MICROSCOPIC CNT: SIGNIFICANT CHANGE UP
ORGANISM # SPEC MICROSCOPIC CNT: SIGNIFICANT CHANGE UP
PLATELET # BLD AUTO: 270 K/UL — SIGNIFICANT CHANGE UP (ref 150–400)
POTASSIUM SERPL-MCNC: 3.6 MMOL/L — SIGNIFICANT CHANGE UP (ref 3.5–5.3)
POTASSIUM SERPL-SCNC: 3.6 MMOL/L — SIGNIFICANT CHANGE UP (ref 3.5–5.3)
PROT SERPL-MCNC: 6.7 G/DL — SIGNIFICANT CHANGE UP (ref 6–8.3)
RBC # BLD: 4.03 M/UL — SIGNIFICANT CHANGE UP (ref 3.8–5.2)
RBC # FLD: 13.2 % — SIGNIFICANT CHANGE UP (ref 10.3–14.5)
SODIUM SERPL-SCNC: 134 MMOL/L — LOW (ref 135–145)
SPECIMEN SOURCE: SIGNIFICANT CHANGE UP
WBC # BLD: 11.39 K/UL — HIGH (ref 3.8–10.5)
WBC # FLD AUTO: 11.39 K/UL — HIGH (ref 3.8–10.5)

## 2021-01-14 PROCEDURE — 99232 SBSQ HOSP IP/OBS MODERATE 35: CPT | Mod: 57

## 2021-01-14 PROCEDURE — 99233 SBSQ HOSP IP/OBS HIGH 50: CPT | Mod: GC

## 2021-01-14 RX ORDER — INSULIN GLARGINE 100 [IU]/ML
21 INJECTION, SOLUTION SUBCUTANEOUS AT BEDTIME
Refills: 0 | Status: DISCONTINUED | OUTPATIENT
Start: 2021-01-14 | End: 2021-01-15

## 2021-01-14 RX ORDER — MEROPENEM 1 G/30ML
1000 INJECTION INTRAVENOUS EVERY 12 HOURS
Refills: 0 | Status: DISCONTINUED | OUTPATIENT
Start: 2021-01-14 | End: 2021-01-15

## 2021-01-14 RX ORDER — GLUCAGON INJECTION, SOLUTION 0.5 MG/.1ML
1 INJECTION, SOLUTION SUBCUTANEOUS ONCE
Refills: 0 | Status: DISCONTINUED | OUTPATIENT
Start: 2021-01-14 | End: 2021-01-15

## 2021-01-14 RX ORDER — INSULIN LISPRO 100/ML
VIAL (ML) SUBCUTANEOUS EVERY 6 HOURS
Refills: 0 | Status: DISCONTINUED | OUTPATIENT
Start: 2021-01-15 | End: 2021-01-15

## 2021-01-14 RX ORDER — DEXTROSE 50 % IN WATER 50 %
25 SYRINGE (ML) INTRAVENOUS ONCE
Refills: 0 | Status: DISCONTINUED | OUTPATIENT
Start: 2021-01-14 | End: 2021-01-15

## 2021-01-14 RX ORDER — SODIUM CHLORIDE 9 MG/ML
1000 INJECTION, SOLUTION INTRAVENOUS
Refills: 0 | Status: DISCONTINUED | OUTPATIENT
Start: 2021-01-14 | End: 2021-01-15

## 2021-01-14 RX ORDER — INSULIN LISPRO 100/ML
VIAL (ML) SUBCUTANEOUS
Refills: 0 | Status: COMPLETED | OUTPATIENT
Start: 2021-01-14 | End: 2021-01-14

## 2021-01-14 RX ORDER — INSULIN LISPRO 100/ML
VIAL (ML) SUBCUTANEOUS
Refills: 0 | Status: DISCONTINUED | OUTPATIENT
Start: 2021-01-14 | End: 2021-01-14

## 2021-01-14 RX ORDER — DEXTROSE 50 % IN WATER 50 %
12.5 SYRINGE (ML) INTRAVENOUS ONCE
Refills: 0 | Status: DISCONTINUED | OUTPATIENT
Start: 2021-01-14 | End: 2021-01-15

## 2021-01-14 RX ORDER — DEXTROSE 50 % IN WATER 50 %
15 SYRINGE (ML) INTRAVENOUS ONCE
Refills: 0 | Status: DISCONTINUED | OUTPATIENT
Start: 2021-01-14 | End: 2021-01-15

## 2021-01-14 RX ADMIN — INSULIN GLARGINE 21 UNIT(S): 100 INJECTION, SOLUTION SUBCUTANEOUS at 21:13

## 2021-01-14 RX ADMIN — Medication 40 MILLIGRAM(S): at 05:37

## 2021-01-14 RX ADMIN — Medication 20 MILLIGRAM(S): at 05:37

## 2021-01-14 RX ADMIN — HEPARIN SODIUM 7500 UNIT(S): 5000 INJECTION INTRAVENOUS; SUBCUTANEOUS at 12:18

## 2021-01-14 RX ADMIN — MEROPENEM 100 MILLIGRAM(S): 1 INJECTION INTRAVENOUS at 05:27

## 2021-01-14 RX ADMIN — HEPARIN SODIUM 7500 UNIT(S): 5000 INJECTION INTRAVENOUS; SUBCUTANEOUS at 05:37

## 2021-01-14 RX ADMIN — Medication 3: at 08:40

## 2021-01-14 RX ADMIN — Medication 8: at 17:29

## 2021-01-14 RX ADMIN — LINEZOLID 300 MILLIGRAM(S): 600 INJECTION, SOLUTION INTRAVENOUS at 01:14

## 2021-01-14 RX ADMIN — LINEZOLID 300 MILLIGRAM(S): 600 INJECTION, SOLUTION INTRAVENOUS at 23:54

## 2021-01-14 RX ADMIN — Medication 5: at 12:18

## 2021-01-14 RX ADMIN — MEROPENEM 100 MILLIGRAM(S): 1 INJECTION INTRAVENOUS at 13:59

## 2021-01-14 RX ADMIN — Medication 50 MILLIGRAM(S): at 05:37

## 2021-01-14 RX ADMIN — LINEZOLID 300 MILLIGRAM(S): 600 INJECTION, SOLUTION INTRAVENOUS at 12:19

## 2021-01-14 RX ADMIN — MEROPENEM 100 MILLIGRAM(S): 1 INJECTION INTRAVENOUS at 21:13

## 2021-01-14 RX ADMIN — ATORVASTATIN CALCIUM 40 MILLIGRAM(S): 80 TABLET, FILM COATED ORAL at 21:12

## 2021-01-14 NOTE — PROGRESS NOTE ADULT - PROBLEM SELECTOR PLAN 2
Wound cleansed with NS and dressed with DSD  f/u LFWCx    Need medical clearance    Planned Procedure:    Right foot I&D w/ foreign body removal 1/15/21 at 1030 hours w/ Dr Lovell    Podiatry will follow pt while in house. Wound cleansed with NS and dressed with DSD  f/u LFWCx  f/u Bone Scan    Need medical clearance    Planned Procedure:    Right foot I&D w/ foreign body removal 1/15/21 at 1030 hours w/ Dr Lovell    Podiatry will follow pt while in house.

## 2021-01-14 NOTE — PROGRESS NOTE ADULT - PROBLEM SELECTOR PLAN 3
-On lantus 35 units qhs and novolog sliding scale  -Will give lantus 17 unts qhs  -ISS -On lantus 35 units qhs and novolog sliding scale  -Will give lantus 17 unts qhs ---> lantus 21  units qhs  -ISS  - endo c/s

## 2021-01-14 NOTE — PHARMACOTHERAPY INTERVENTION NOTE - COMMENTS
Patient ordered for IV Vancomycin 2g Q12h for cellulitis of RLE. Discussed with ID Dr. Chisholm that patient not likely to reach therapeutic level due to weight (172 kg) and recommended alternatives for MRSA coverage. MD would like to discontinue vancomycin and order IV Linezolid 600mg Q12h for now. Ordered.
Patient currently ordered for IV linezolid 600 mg Q12h for skin and soft tissue infection. MRSA PCR negative. Discussed with ID Dr. Felipa Rahman, recommended discontinuing linezolid. MD would like to continue with linezolid until culture sensitivities return due to clinical improvement since starting linezolid. Patient also receiving meropenem 1000 mg Q8h, eGFR today 46 mL/min. Discussed with MD, recommended renally dose adjusting to meropenem 1000 mg Q12h, accepted.
Patient ordered for Lovenox 40 mg BID for DVT prophylaxis, BMI 53. Patient with uncertain plan for OR 1/14. Discussed with Dr. Martin, recommended switching to heparin 7500 units SQ q8h (BMI >50) due to shorter half-life, accepted.

## 2021-01-14 NOTE — PROGRESS NOTE ADULT - ASSESSMENT
Patient is 69 yo F PMHx HTN, DM2,  LBBB (w/ neg stress, TTE 6/21 w/ mild AS), lymphedema presents to ED c/o right lower leg pain/swelling and redness for the past 3 days. Admitted for RLE cellulitis, found to have foreign body in foot Patient is 71 yo F PMHx HTN, DM2,  LBBB (w/ neg stress, TTE 6/21 w/ mild AS), lymphedema presents to ED c/o right lower leg pain/swelling and redness for the past 3 days. Admitted for RLE cellulitis, found to have foreign body in foot. Planned for I&D tomorrow.

## 2021-01-14 NOTE — PROGRESS NOTE ADULT - PROBLEM SELECTOR PLAN 1
-Patient with RLE swelling, pain, erythema x2-3 days  -WBC elevated, down trending  -S/p vanco and meropenem  -Continue linezolid and meropenem as per ID  - Wound culture PRELIM with rare staph aureus and group B strep  - blood culture prelim x 2 NGTD  - Podiatry consulted, Dr. Lovell, recs appreciated   - Plan for bone scan, official podiatry plan pending bone scan results    - ID consulted, Dr. Chisholm, recs appreciated -Patient with RLE swelling, pain, erythema x2-3 days  -WBC elevated, down trending  -S/p vanco and meropenem  -Continue linezolid and meropenem as per ID  - Wound culture PRELIM with rare staph aureus and group B strep  - blood culture prelim x 2 NGTD  - Podiatry consulted, Dr. Lovell, recs appreciated   - Plan for I&D tomorrow   - Plan for bone scan, official further podiatry intervention pending bone scan results    - ID consulted, Dr. Chisholm, recs appreciated

## 2021-01-14 NOTE — PROGRESS NOTE ADULT - SUBJECTIVE AND OBJECTIVE BOX
70y year old Female seen at [facility] 1EAS 112 W1 for cellulites right foot.  Pt's did not have dressing on right foot.  Denied chills, nausea, vomiting, chest pain, shortness of breath, or calf pain at this time.  Allergies    penicillins (Hives)    Intolerances        MEDICATIONS  (STANDING):  atorvastatin 40 milliGRAM(s) Oral at bedtime  dextrose 40% Gel 15 Gram(s) Oral once  dextrose 5%. 1000 milliLiter(s) (50 mL/Hr) IV Continuous <Continuous>  dextrose 5%. 1000 milliLiter(s) (100 mL/Hr) IV Continuous <Continuous>  dextrose 50% Injectable 25 Gram(s) IV Push once  dextrose 50% Injectable 12.5 Gram(s) IV Push once  dextrose 50% Injectable 25 Gram(s) IV Push once  enalapril 20 milliGRAM(s) Oral daily  furosemide    Tablet 40 milliGRAM(s) Oral daily  glucagon  Injectable 1 milliGRAM(s) IntraMuscular once  insulin glargine Injectable (LANTUS) 21 Unit(s) SubCutaneous at bedtime  insulin lispro (ADMELOG) corrective regimen sliding scale   SubCutaneous three times a day before meals  linezolid  IVPB 600 milliGRAM(s) IV Intermittent every 12 hours  meropenem  IVPB 1000 milliGRAM(s) IV Intermittent every 8 hours  metoprolol succinate ER 50 milliGRAM(s) Oral daily    MEDICATIONS  (PRN):  acetaminophen   Tablet .. 650 milliGRAM(s) Oral every 6 hours PRN Temp greater or equal to 38C (100.4F)      Vital Signs Last 24 Hrs  T(C): 36.7 (14 Jan 2021 16:19), Max: 36.7 (13 Jan 2021 20:12)  T(F): 98.1 (14 Jan 2021 16:19), Max: 98.1 (14 Jan 2021 04:47)  HR: 65 (14 Jan 2021 16:19) (61 - 65)  BP: 138/61 (14 Jan 2021 16:19) (138/61 - 155/62)  BP(mean): --  RR: 18 (14 Jan 2021 16:19) (17 - 18)  SpO2: 94% (14 Jan 2021 16:19) (93% - 94%)    PHYSICAL EXAM:  Vascular: DP & PT nonpalpable bilaterally due to edema, Capillary refill 3 seconds.  Nonpitting edema to leg b/l.  2+ pitting edema to dorsal foot b/l  Neurological: Light touch sensation intact bilaterally  Musculoskeletal: POP plantar right forefoot   Dermatological: Wound (1)  Right foot sub 1st met head puncture wound.  Approximately 0.1 cm annular.  4 cc white purulence expressed.  PTB(+)    CBC Full  -  ( 14 Jan 2021 09:15 )  WBC Count : 11.39 K/uL  RBC Count : 4.03 M/uL  Hemoglobin : 11.7 g/dL  Hematocrit : 36.7 %  Platelet Count - Automated : 270 K/uL  Mean Cell Volume : 91.1 fl  Mean Cell Hemoglobin : 29.0 pg  Mean Cell Hemoglobin Concentration : 31.9 gm/dL  Auto Neutrophil # : 8.66 K/uL  Auto Lymphocyte # : 1.05 K/uL  Auto Monocyte # : 1.16 K/uL  Auto Eosinophil # : 0.41 K/uL  Auto Basophil # : 0.06 K/uL  Auto Neutrophil % : 76.1 %  Auto Lymphocyte % : 9.2 %  Auto Monocyte % : 10.2 %  Auto Eosinophil % : 3.6 %  Auto Basophil % : 0.5 %      ----------CHEM PANEL----------    PT/INR - ( 12 Jan 2021 17:30 )   PT: 14.6 sec;   INR: 1.26 ratio         PTT - ( 12 Jan 2021 17:30 )  PTT:26.9 sec      Culture - Blood (collected 13 Jan 2021 00:59)  Source: .Blood Blood-Peripheral  Preliminary Report (14 Jan 2021 01:06):    No growth to date.    Culture - Blood (collected 13 Jan 2021 00:31)  Source: .Blood Blood-Peripheral  Preliminary Report (14 Jan 2021 01:06):    No growth to date.    Culture - Other (collected 13 Jan 2021 00:28)  Source: .Other right foot  Final Report (14 Jan 2021 16:27):    Rare Staphylococcus aureus    Moderate Streptococcus agalactiae (Group B) isolated    Group B streptococci are susceptible to ampicillin,    penicillin and cefazolin, but may be resistant to    erythromycin and clindamycin.    Recommendations for intrapartumprophylaxis for Group B    streptococci are penicillin or ampicillin.  Organism: Staphylococcus aureus (14 Jan 2021 16:27)  Organism: Staphylococcus aureus (14 Jan 2021 16:27)        Imaging: ----------

## 2021-01-14 NOTE — PROGRESS NOTE ADULT - PROBLEM SELECTOR PLAN 2
-Plan for possible Right foot I&D w/ foreign body removal, plan may change pending bone scan  -Xray foot: Significant soft tissue swelling, predominantly involving the lower leg. No radiographic evidence of osteomyelitis. Suspect linear foreign bodies in the plantar soft tissues at the level of metatarsals as described above.  - Arterial Doppler- Flow could only seen in the anterior tibial artery however nonvisualization of the posterior tibial and peroneal arteries may be technical. Elevated velocities in the dorsalis pedis artery likely reflect underlying hemodynamically significant stenosis  -RENEE's within normal limits bilaterally  -Vascular, Dr. Hanson consulted - No vascular contraindication to podiatry procedure  - F/u bone scan  - wound culture prelim with rare staph aureus and group B strep   - Podiatry plan for procedure possibly Friday -Plan for Right foot I&D w/ foreign body removal   - Medically optimized for podiatry planned I&D  - Possible additional podiatry procedure pending bone scan  -Xray foot: Significant soft tissue swelling, predominantly involving the lower leg. No radiographic evidence of osteomyelitis. Suspect linear foreign bodies in the plantar soft tissues at the level of metatarsals as described above.  - Arterial Doppler- Flow could only seen in the anterior tibial artery however nonvisualization of the posterior tibial and peroneal arteries may be technical. Elevated velocities in the dorsalis pedis artery likely reflect underlying hemodynamically significant stenosis  -RENEE's within normal limits bilaterally  -Vascular, Dr. Hanson consulted - No vascular contraindication to podiatry procedure  - F/u bone scan  - wound culture prelim with rare staph aureus and group B strep   - Podiatry plan for procedure possibly Friday

## 2021-01-14 NOTE — PROGRESS NOTE ADULT - SUBJECTIVE AND OBJECTIVE BOX
[facility], Division of Infectious Diseases  JOE Slaughter Y. Patel, S. Shah  [phone]    Name: EVELYN LOPEZ  Age: 70y  Gender: Female  MRN: [id number]  Note Date: 01-14-21    Interval History:  Patient seen and examined at bedside this morning  No acute overnight events. Afebrile  Improved LE pain and erythema today  Still awaiting podiatry plan  Notes reviewed    Antibiotics:  linezolid  IVPB 600 milliGRAM(s) IV Intermittent every 12 hours  meropenem  IVPB 1000 milliGRAM(s) IV Intermittent every 8 hours      Medications:  acetaminophen   Tablet .. 650 milliGRAM(s) Oral every 6 hours PRN  atorvastatin 40 milliGRAM(s) Oral at bedtime  dextrose 40% Gel 15 Gram(s) Oral once  dextrose 5%. 1000 milliLiter(s) IV Continuous <Continuous>  dextrose 5%. 1000 milliLiter(s) IV Continuous <Continuous>  dextrose 50% Injectable 25 Gram(s) IV Push once  dextrose 50% Injectable 12.5 Gram(s) IV Push once  dextrose 50% Injectable 25 Gram(s) IV Push once  enalapril 20 milliGRAM(s) Oral daily  furosemide    Tablet 40 milliGRAM(s) Oral daily  glucagon  Injectable 1 milliGRAM(s) IntraMuscular once  heparin   Injectable 7500 Unit(s) SubCutaneous every 8 hours  insulin glargine Injectable (LANTUS) 17 Unit(s) SubCutaneous at bedtime  insulin lispro (ADMELOG) corrective regimen sliding scale   SubCutaneous three times a day before meals  insulin lispro (ADMELOG) corrective regimen sliding scale   SubCutaneous at bedtime  linezolid  IVPB 600 milliGRAM(s) IV Intermittent every 12 hours  meropenem  IVPB 1000 milliGRAM(s) IV Intermittent every 8 hours  metoprolol succinate ER 50 milliGRAM(s) Oral daily      Review of Systems:  A 10-point review of systems was obtained.     Pertinent positives and negatives--  Constitutional: No fevers. No Chills. No Rigors.   Cardiovascular: No chest pain. No palpitations.  Respiratory: No shortness of breath. No cough.  Gastrointestinal: No nausea or vomiting. No diarrhea or constipation.   Psychiatric: Pleasant. Appropriate affect.    Review of systems otherwise negative except as previously noted.    Allergies: penicillins (Hives)    For details regarding the patient's past medical history, social history, family history, and other miscellaneous elements, please refer the initial infectious diseases consultation and/or the admitting history and physical examination for this admission.    Objective:  Vitals:   T(C): 36.7 (01-14-21 @ 04:47), Max: 36.7 (01-13-21 @ 20:12)  HR: 61 (01-14-21 @ 04:47) (61 - 67)  BP: 155/61 (01-14-21 @ 04:47) (146/55 - 155/62)  RR: 17 (01-14-21 @ 04:47) (17 - 17)  SpO2: 93% (01-14-21 @ 04:47) (91% - 93%)    Physical Examination:  General: no acute distress  HEENT: NC/AT, EOMI, anicteric, no oral lesions  Neck: supple, no palpable LAD  Cardio: S1, S2 heard, RRR, no murmurs  Resp: breath sounds heard bilaterally, no rales, wheezes or rhonchi  Abd: soft, NT, ND, + bowel sounds  Neuro: AAOx3, no obvious focal deficits  Ext: b/l LE edema, decreased erythema. R foot remains dressed  Skin: warm, dry, no visible rash      Laboratory Studies:  CBC:                       11.7   11.39 )-----------( 270      ( 14 Jan 2021 09:15 )             36.7     CMP: 01-14    134<L>  |  98  |  17  ----------------------------<  301<H>  3.6   |  30  |  1.20    Ca    7.9<L>      14 Jan 2021 09:15    TPro  6.7  /  Alb  2.4<L>  /  TBili  0.6  /  DBili  x   /  AST  23  /  ALT  23  /  AlkPhos  82  01-14    LIVER FUNCTIONS - ( 14 Jan 2021 09:15 )  Alb: 2.4 g/dL / Pro: 6.7 g/dL / ALK PHOS: 82 U/L / ALT: 23 U/L / AST: 23 U/L / GGT: x             Microbiology: reviewed    Culture - Blood (collected 01-13-21 @ 00:59)  Source: .Blood Blood-Peripheral  Preliminary Report (01-14-21 @ 01:06):    No growth to date.    Culture - Blood (collected 01-13-21 @ 00:31)  Source: .Blood Blood-Peripheral  Preliminary Report (01-14-21 @ 01:06):    No growth to date.    Culture - Other (collected 01-13-21 @ 00:28)  Source: .Other right foot  Preliminary Report (01-13-21 @ 21:30):    Rare Staphylococcus aureus    Moderate Streptococcus agalactiae (Group B) isolated    Group B streptococci are susceptible to ampicillin,    penicillin and cefazolin, but may be resistant to    erythromycin and clindamycin.    Recommendations for intrapartumprophylaxis for Group B    streptococci are penicillin or ampicillin.        Radiology: reviewed

## 2021-01-14 NOTE — PROGRESS NOTE ADULT - SUBJECTIVE AND OBJECTIVE BOX
Patient is a 70y old  Female who presents with a chief complaint of RLE cellulitis, foreign body in foot (14 Jan 2021 10:10)    INTERVAL HPI/OVERNIGHT EVENTS: Patient seen and examined at bedside. Yesterday podiatry expressed 0.5cc of purulence from the RLE wound, cleaned and dressed wound. Pt says that since yesterday she feels her RLE is less erythematous and less swollen. Patient has no complaints at this time. Denies fevers, chills, headache, lightheadedness, chest pain, dyspnea, abdominal pain, n/v/d/c.    MEDICATIONS  (STANDING):  atorvastatin 40 milliGRAM(s) Oral at bedtime  dextrose 40% Gel 15 Gram(s) Oral once  dextrose 5%. 1000 milliLiter(s) (50 mL/Hr) IV Continuous <Continuous>  dextrose 5%. 1000 milliLiter(s) (100 mL/Hr) IV Continuous <Continuous>  dextrose 50% Injectable 25 Gram(s) IV Push once  dextrose 50% Injectable 12.5 Gram(s) IV Push once  dextrose 50% Injectable 25 Gram(s) IV Push once  enalapril 20 milliGRAM(s) Oral daily  furosemide    Tablet 40 milliGRAM(s) Oral daily  glucagon  Injectable 1 milliGRAM(s) IntraMuscular once  insulin glargine Injectable (LANTUS) 17 Unit(s) SubCutaneous at bedtime  insulin lispro (ADMELOG) corrective regimen sliding scale   SubCutaneous Before meals and at bedtime  linezolid  IVPB 600 milliGRAM(s) IV Intermittent every 12 hours  meropenem  IVPB 1000 milliGRAM(s) IV Intermittent every 8 hours  metoprolol succinate ER 50 milliGRAM(s) Oral daily    MEDICATIONS  (PRN):  acetaminophen   Tablet .. 650 milliGRAM(s) Oral every 6 hours PRN Temp greater or equal to 38C (100.4F)      Allergies    penicillins (Hives)    Intolerances        REVIEW OF SYSTEMS:  CONSTITUTIONAL: No fever or chills  HEENT:  No headache, no sore throat  RESPIRATORY: No cough, wheezing, or shortness of breath  CARDIOVASCULAR: No chest pain, palpitations  GASTROINTESTINAL: No abd pain, nausea, vomiting, or diarrhea  GENITOURINARY: No dysuria, frequency, or hematuria  NEUROLOGICAL: no focal weakness or dizziness  MUSCULOSKELETAL: RLE with some more swelling than usual, no myalgias     Vital Signs Last 24 Hrs  T(C): 36.7 (14 Jan 2021 04:47), Max: 36.7 (13 Jan 2021 20:12)  T(F): 98.1 (14 Jan 2021 04:47), Max: 98.1 (14 Jan 2021 04:47)  HR: 61 (14 Jan 2021 04:47) (61 - 63)  BP: 155/61 (14 Jan 2021 04:47) (155/61 - 155/62)  BP(mean): --  RR: 17 (14 Jan 2021 04:47) (17 - 17)  SpO2: 93% (14 Jan 2021 04:47) (93% - 93%)    PHYSICAL EXAM:  GENERAL: NAD  HEENT:  anicteric, moist mucous membranes  CHEST/LUNG:  CTA b/l, no rales, wheezes, or rhonchi  HEART:  RRR, S1, S2  ABDOMEN:  BS+, soft, nontender, nondistended  EXTREMITIES: +RLE with clean and dry dressing, +RLE erythema with 1+ pitting edema, RLE warm to touch in comparison to LLE, R calf size > L, LLE with 1+ pitting edema, with b/l LE chronic venous stasis changes, unable to palpate b/l distal pulses, no tenderness   NERVOUS SYSTEM: answers questions and follows commands appropriately    LABS:                        11.7   11.39 )-----------( 270      ( 14 Jan 2021 09:15 )             36.7     CBC Full  -  ( 14 Jan 2021 09:15 )  WBC Count : 11.39 K/uL  Hemoglobin : 11.7 g/dL  Hematocrit : 36.7 %  Platelet Count - Automated : 270 K/uL  Mean Cell Volume : 91.1 fl  Mean Cell Hemoglobin : 29.0 pg  Mean Cell Hemoglobin Concentration : 31.9 gm/dL  Auto Neutrophil # : 8.66 K/uL  Auto Lymphocyte # : 1.05 K/uL  Auto Monocyte # : 1.16 K/uL  Auto Eosinophil # : 0.41 K/uL  Auto Basophil # : 0.06 K/uL  Auto Neutrophil % : 76.1 %  Auto Lymphocyte % : 9.2 %  Auto Monocyte % : 10.2 %  Auto Eosinophil % : 3.6 %  Auto Basophil % : 0.5 %    14 Jan 2021 09:15    134    |  98     |  17     ----------------------------<  301    3.6     |  30     |  1.20     Ca    7.9        14 Jan 2021 09:15    TPro  6.7    /  Alb  2.4    /  TBili  0.6    /  DBili  x      /  AST  23     /  ALT  23     /  AlkPhos  82     14 Jan 2021 09:15    PT/INR - ( 12 Jan 2021 17:30 )   PT: 14.6 sec;   INR: 1.26 ratio         PTT - ( 12 Jan 2021 17:30 )  PTT:26.9 sec    CAPILLARY BLOOD GLUCOSE      POCT Blood Glucose.: 353 mg/dL (14 Jan 2021 11:43)  POCT Blood Glucose.: 299 mg/dL (14 Jan 2021 08:00)  POCT Blood Glucose.: 329 mg/dL (13 Jan 2021 21:41)  POCT Blood Glucose.: 277 mg/dL (13 Jan 2021 16:49)        Culture - Blood (collected 01-13-21 @ 00:59)  Source: .Blood Blood-Peripheral  Preliminary Report (01-14-21 @ 01:06):    No growth to date.    Culture - Blood (collected 01-13-21 @ 00:31)  Source: .Blood Blood-Peripheral  Preliminary Report (01-14-21 @ 01:06):    No growth to date.    Culture - Other (collected 01-13-21 @ 00:28)  Source: .Other right foot  Preliminary Report (01-13-21 @ 21:30):    Rare Staphylococcus aureus    Moderate Streptococcus agalactiae (Group B) isolated    Group B streptococci are susceptible to ampicillin,    penicillin and cefazolin, but may be resistant to    erythromycin and clindamycin.    Recommendations for intrapartumprophylaxis for Group B    streptococci are penicillin or ampicillin.        RADIOLOGY & ADDITIONAL TESTS:    Personally reviewed.     Consultant(s) Notes Reviewed:  [x] YES  [ ] NO

## 2021-01-14 NOTE — PROGRESS NOTE ADULT - ASSESSMENT
Pt is a 70W w/ PMHx of HTN, DM2, LBBB (w/ neg stress, TTE 6/21 w/ mild AS), lymphedema presents to ED c/o RLE pain/swelling/redness x3 days. Admitted for RLE cellulitis, found to have foreign body in foot    RLE Cellulitis  Foreign body in foot  Fevers  Leukocytosis  -imaging reviewed  --Xray foot: Significant soft tissue swelling, predominantly involving the lower leg. No radiographic evidence of osteomyelitis.   Suspect linear foreign bodies in the plantar soft tissues at the level of metatarsals as described above.  -BCx/WCx NGTD  -MRSA negative  -appreciate podiatry recs  --plan for I&D w/ foreign body removal  --please send any fluid recovered for culture to help guide Abx therapy  -trend fevers/leukocytosis  --leukocytosis downtrending  -s/p vanc/meropenem in the ED; switched to linezolid as vancomycin unlikely to reach therapeutic levels  -c/w linezolid 600mg IV q6h  -c/w meropenem for now; will review allergy w/ pt and narrow therapy if possible    Penicillin Allergy  -pt reports severe rash and hives w/ use of penicillins    DM2  -strict glucose control in setting of acute infection    Lymphedema  -c/w home lasix  -c/w limb elevation   Pt is a 70W w/ PMHx of HTN, DM2, LBBB (w/ neg stress, TTE 6/21 w/ mild AS), lymphedema presents to ED c/o RLE pain/swelling/redness x3 days. Admitted for RLE cellulitis, found to have foreign body in foot    RLE Cellulitis  Foreign body in foot  Fevers  Leukocytosis  -imaging reviewed  --Xray foot: Significant soft tissue swelling, predominantly involving the lower leg. No radiographic evidence of osteomyelitis.   Suspect linear foreign bodies in the plantar soft tissues at the level of metatarsals as described above.  -BCx/WCx NGTD  -MRSA negative  -appreciate podiatry recs  --plan for I&D w/ foreign body removal  --please send any fluid recovered for culture to help guide Abx therapy  -trend fevers/leukocytosis  --leukocytosis downtrending  -s/p vanc/meropenem in the ED; switched to linezolid as vancomycin unlikely to reach therapeutic levels  -c/w linezolid 600mg IV q12h  -c/w meropenem for now; will review allergy w/ pt and narrow therapy if possible    Penicillin Allergy  -pt reports severe rash and hives and SOB w/ use of penicillins  -avoid penicllin/cephalosporins for now    DM2  -strict glucose control in setting of acute infection    Lymphedema  -c/w home lasix  -c/w limb elevation

## 2021-01-15 ENCOUNTER — RESULT REVIEW (OUTPATIENT)
Age: 71
End: 2021-01-15

## 2021-01-15 LAB
ALBUMIN SERPL ELPH-MCNC: 2.2 G/DL — LOW (ref 3.3–5)
ALP SERPL-CCNC: 75 U/L — SIGNIFICANT CHANGE UP (ref 40–120)
ALT FLD-CCNC: 22 U/L — SIGNIFICANT CHANGE UP (ref 12–78)
ANION GAP SERPL CALC-SCNC: 4 MMOL/L — LOW (ref 5–17)
APTT BLD: 27 SEC — LOW (ref 27.5–35.5)
AST SERPL-CCNC: 19 U/L — SIGNIFICANT CHANGE UP (ref 15–37)
BILIRUB SERPL-MCNC: 0.4 MG/DL — SIGNIFICANT CHANGE UP (ref 0.2–1.2)
BUN SERPL-MCNC: 19 MG/DL — SIGNIFICANT CHANGE UP (ref 7–23)
CALCIUM SERPL-MCNC: 7.6 MG/DL — LOW (ref 8.5–10.1)
CHLORIDE SERPL-SCNC: 98 MMOL/L — SIGNIFICANT CHANGE UP (ref 96–108)
CO2 SERPL-SCNC: 32 MMOL/L — HIGH (ref 22–31)
CREAT SERPL-MCNC: 1.3 MG/DL — SIGNIFICANT CHANGE UP (ref 0.5–1.3)
GLUCOSE SERPL-MCNC: 369 MG/DL — HIGH (ref 70–99)
GRAM STN FLD: SIGNIFICANT CHANGE UP
HCT VFR BLD CALC: 33 % — LOW (ref 34.5–45)
HGB BLD-MCNC: 10.8 G/DL — LOW (ref 11.5–15.5)
INR BLD: 1.15 RATIO — SIGNIFICANT CHANGE UP (ref 0.88–1.16)
MCHC RBC-ENTMCNC: 29.4 PG — SIGNIFICANT CHANGE UP (ref 27–34)
MCHC RBC-ENTMCNC: 32.7 GM/DL — SIGNIFICANT CHANGE UP (ref 32–36)
MCV RBC AUTO: 89.9 FL — SIGNIFICANT CHANGE UP (ref 80–100)
NRBC # BLD: 0 /100 WBCS — SIGNIFICANT CHANGE UP (ref 0–0)
PLATELET # BLD AUTO: 257 K/UL — SIGNIFICANT CHANGE UP (ref 150–400)
POTASSIUM SERPL-MCNC: 3.8 MMOL/L — SIGNIFICANT CHANGE UP (ref 3.5–5.3)
POTASSIUM SERPL-SCNC: 3.8 MMOL/L — SIGNIFICANT CHANGE UP (ref 3.5–5.3)
PROT SERPL-MCNC: 6.3 G/DL — SIGNIFICANT CHANGE UP (ref 6–8.3)
PROTHROM AB SERPL-ACNC: 13.4 SEC — SIGNIFICANT CHANGE UP (ref 10.6–13.6)
RBC # BLD: 3.67 M/UL — LOW (ref 3.8–5.2)
RBC # FLD: 13 % — SIGNIFICANT CHANGE UP (ref 10.3–14.5)
SODIUM SERPL-SCNC: 134 MMOL/L — LOW (ref 135–145)
SPECIMEN SOURCE: SIGNIFICANT CHANGE UP
WBC # BLD: 9.59 K/UL — SIGNIFICANT CHANGE UP (ref 3.8–10.5)
WBC # FLD AUTO: 9.59 K/UL — SIGNIFICANT CHANGE UP (ref 3.8–10.5)

## 2021-01-15 PROCEDURE — 88300 SURGICAL PATH GROSS: CPT | Mod: 26

## 2021-01-15 PROCEDURE — 11045 DBRDMT SUBQ TISS EACH ADDL: CPT | Mod: 59

## 2021-01-15 PROCEDURE — 88311 DECALCIFY TISSUE: CPT | Mod: 26

## 2021-01-15 PROCEDURE — 99233 SBSQ HOSP IP/OBS HIGH 50: CPT | Mod: GC

## 2021-01-15 PROCEDURE — 78800 RP LOCLZJ TUM 1 AREA 1 D IMG: CPT | Mod: 26

## 2021-01-15 PROCEDURE — 73630 X-RAY EXAM OF FOOT: CPT | Mod: 26,RT

## 2021-01-15 PROCEDURE — 27620 EXPLORE/TREAT ANKLE JOINT: CPT

## 2021-01-15 PROCEDURE — 11042 DBRDMT SUBQ TIS 1ST 20SQCM/<: CPT | Mod: 59

## 2021-01-15 PROCEDURE — 88304 TISSUE EXAM BY PATHOLOGIST: CPT | Mod: 26

## 2021-01-15 RX ORDER — INSULIN LISPRO 100/ML
VIAL (ML) SUBCUTANEOUS EVERY 6 HOURS
Refills: 0 | Status: DISCONTINUED | OUTPATIENT
Start: 2021-01-15 | End: 2021-01-15

## 2021-01-15 RX ORDER — DEXTROSE 50 % IN WATER 50 %
25 SYRINGE (ML) INTRAVENOUS ONCE
Refills: 0 | Status: DISCONTINUED | OUTPATIENT
Start: 2021-01-15 | End: 2021-01-20

## 2021-01-15 RX ORDER — ACETAMINOPHEN 500 MG
1000 TABLET ORAL ONCE
Refills: 0 | Status: DISCONTINUED | OUTPATIENT
Start: 2021-01-15 | End: 2021-01-15

## 2021-01-15 RX ORDER — HEPARIN SODIUM 5000 [USP'U]/ML
7500 INJECTION INTRAVENOUS; SUBCUTANEOUS EVERY 8 HOURS
Refills: 0 | Status: DISCONTINUED | OUTPATIENT
Start: 2021-01-16 | End: 2021-01-20

## 2021-01-15 RX ORDER — INSULIN GLARGINE 100 [IU]/ML
30 INJECTION, SOLUTION SUBCUTANEOUS AT BEDTIME
Refills: 0 | Status: DISCONTINUED | OUTPATIENT
Start: 2021-01-15 | End: 2021-01-17

## 2021-01-15 RX ORDER — METOPROLOL TARTRATE 50 MG
50 TABLET ORAL DAILY
Refills: 0 | Status: DISCONTINUED | OUTPATIENT
Start: 2021-01-15 | End: 2021-01-20

## 2021-01-15 RX ORDER — INSULIN LISPRO 100/ML
VIAL (ML) SUBCUTANEOUS AT BEDTIME
Refills: 0 | Status: DISCONTINUED | OUTPATIENT
Start: 2021-01-15 | End: 2021-01-16

## 2021-01-15 RX ORDER — LINEZOLID 600 MG/300ML
600 INJECTION, SOLUTION INTRAVENOUS EVERY 12 HOURS
Refills: 0 | Status: DISCONTINUED | OUTPATIENT
Start: 2021-01-15 | End: 2021-01-17

## 2021-01-15 RX ORDER — INSULIN LISPRO 100/ML
VIAL (ML) SUBCUTANEOUS
Refills: 0 | Status: DISCONTINUED | OUTPATIENT
Start: 2021-01-15 | End: 2021-01-16

## 2021-01-15 RX ORDER — GLUCAGON INJECTION, SOLUTION 0.5 MG/.1ML
1 INJECTION, SOLUTION SUBCUTANEOUS ONCE
Refills: 0 | Status: DISCONTINUED | OUTPATIENT
Start: 2021-01-15 | End: 2021-01-20

## 2021-01-15 RX ORDER — INSULIN GLARGINE 100 [IU]/ML
21 INJECTION, SOLUTION SUBCUTANEOUS AT BEDTIME
Refills: 0 | Status: DISCONTINUED | OUTPATIENT
Start: 2021-01-15 | End: 2021-01-15

## 2021-01-15 RX ORDER — SODIUM CHLORIDE 9 MG/ML
1000 INJECTION, SOLUTION INTRAVENOUS
Refills: 0 | Status: DISCONTINUED | OUTPATIENT
Start: 2021-01-15 | End: 2021-01-15

## 2021-01-15 RX ORDER — FUROSEMIDE 40 MG
40 TABLET ORAL DAILY
Refills: 0 | Status: DISCONTINUED | OUTPATIENT
Start: 2021-01-15 | End: 2021-01-20

## 2021-01-15 RX ORDER — INSULIN GLARGINE 100 [IU]/ML
30 INJECTION, SOLUTION SUBCUTANEOUS AT BEDTIME
Refills: 0 | Status: DISCONTINUED | OUTPATIENT
Start: 2021-01-15 | End: 2021-01-15

## 2021-01-15 RX ORDER — DEXTROSE 50 % IN WATER 50 %
15 SYRINGE (ML) INTRAVENOUS ONCE
Refills: 0 | Status: DISCONTINUED | OUTPATIENT
Start: 2021-01-15 | End: 2021-01-16

## 2021-01-15 RX ORDER — SODIUM CHLORIDE 9 MG/ML
1000 INJECTION, SOLUTION INTRAVENOUS
Refills: 0 | Status: DISCONTINUED | OUTPATIENT
Start: 2021-01-15 | End: 2021-01-16

## 2021-01-15 RX ORDER — ATORVASTATIN CALCIUM 80 MG/1
40 TABLET, FILM COATED ORAL AT BEDTIME
Refills: 0 | Status: DISCONTINUED | OUTPATIENT
Start: 2021-01-15 | End: 2021-01-20

## 2021-01-15 RX ADMIN — Medication 4: at 18:16

## 2021-01-15 RX ADMIN — Medication 20 MILLIGRAM(S): at 18:13

## 2021-01-15 RX ADMIN — Medication 20 MILLIGRAM(S): at 04:49

## 2021-01-15 RX ADMIN — Medication 2: at 23:12

## 2021-01-15 RX ADMIN — Medication 40 MILLIGRAM(S): at 18:13

## 2021-01-15 RX ADMIN — LINEZOLID 300 MILLIGRAM(S): 600 INJECTION, SOLUTION INTRAVENOUS at 18:14

## 2021-01-15 RX ADMIN — Medication 4: at 00:36

## 2021-01-15 RX ADMIN — Medication 40 MILLIGRAM(S): at 04:49

## 2021-01-15 RX ADMIN — Medication 50 MILLIGRAM(S): at 18:14

## 2021-01-15 RX ADMIN — Medication 50 MILLIGRAM(S): at 04:49

## 2021-01-15 RX ADMIN — INSULIN GLARGINE 30 UNIT(S): 100 INJECTION, SOLUTION SUBCUTANEOUS at 23:03

## 2021-01-15 RX ADMIN — ATORVASTATIN CALCIUM 40 MILLIGRAM(S): 80 TABLET, FILM COATED ORAL at 22:03

## 2021-01-15 RX ADMIN — Medication 3: at 07:00

## 2021-01-15 NOTE — PROGRESS NOTE ADULT - PROBLEM SELECTOR PLAN 6
Will give heparin 7500units subq -- held for OR today    IMPROVE VTE Individual Risk Assessment          RISK                                                          Points  [  ] Previous VTE                                                3  [  ] Thrombophilia                                             2  [  ] Lower limb paralysis                                   2        (unable to hold up >15 seconds)    [  ] Current Cancer                                             2         (within 6 months)  [  ] Immobilization > 24 hrs                              1  [  ] ICU/CCU stay > 24 hours                             1  [  ] Age > 60                                                         1    IMPROVE VTE Score: 1

## 2021-01-15 NOTE — CONSULT NOTE ADULT - REASON FOR ADMISSION
RLE cellulitis, foreign body in foot

## 2021-01-15 NOTE — PROGRESS NOTE ADULT - SUBJECTIVE AND OBJECTIVE BOX
[facility], Division of Infectious Diseases  JOE Slaughter Y. Patel, S. Shah  [phone]    Name: EVELYN LOPEZ  Age: 70y  Gender: Female  MRN: [id number]  Note Date: 01-15-21    Interval History:  Patient seen and examined at bedside this morning  No acute overnight events. Afebrile overnight  Notes reviewed  Planned for I&D and foreign body removal today in the OR    Antibiotics:  linezolid  IVPB 600 milliGRAM(s) IV Intermittent every 12 hours  meropenem  IVPB 1000 milliGRAM(s) IV Intermittent every 12 hours      Medications:  acetaminophen   Tablet .. 650 milliGRAM(s) Oral every 6 hours PRN  atorvastatin 40 milliGRAM(s) Oral at bedtime  dextrose 40% Gel 15 Gram(s) Oral once  dextrose 5%. 1000 milliLiter(s) IV Continuous <Continuous>  dextrose 5%. 1000 milliLiter(s) IV Continuous <Continuous>  dextrose 50% Injectable 25 Gram(s) IV Push once  dextrose 50% Injectable 12.5 Gram(s) IV Push once  dextrose 50% Injectable 25 Gram(s) IV Push once  enalapril 20 milliGRAM(s) Oral daily  furosemide    Tablet 40 milliGRAM(s) Oral daily  glucagon  Injectable 1 milliGRAM(s) IntraMuscular once  insulin glargine Injectable (LANTUS) 21 Unit(s) SubCutaneous at bedtime  insulin lispro (ADMELOG) corrective regimen sliding scale   SubCutaneous every 6 hours  linezolid  IVPB 600 milliGRAM(s) IV Intermittent every 12 hours  meropenem  IVPB 1000 milliGRAM(s) IV Intermittent every 12 hours  metoprolol succinate ER 50 milliGRAM(s) Oral daily      Review of Systems:  A 10-point review of systems was obtained.     Pertinent positives and negatives--  Constitutional: No fevers. No Chills. No Rigors.   Cardiovascular: No chest pain. No palpitations.  Respiratory: No shortness of breath. No cough.  Gastrointestinal: No nausea or vomiting. No diarrhea or constipation.   Psychiatric: Pleasant. Appropriate affect.    Review of systems otherwise negative except as previously noted.    Allergies: penicillins (Hives)    For details regarding the patient's past medical history, social history, family history, and other miscellaneous elements, please refer the initial infectious diseases consultation and/or the admitting history and physical examination for this admission.    Objective:  Vitals:   T(C): 37.2 (01-15-21 @ 04:27), Max: 37.2 (01-15-21 @ 04:27)  HR: 62 (01-15-21 @ 04:27) (62 - 66)  BP: 152/78 (01-15-21 @ 04:27) (138/61 - 155/52)  RR: 18 (01-15-21 @ 04:27) (18 - 18)  SpO2: 92% (01-15-21 @ 04:27) (92% - 94%)    Physical Examination:  General: no acute distress  HEENT: NC/AT, EOMI, anicteric, no oral lesions  Neck: supple, no palpable LAD  Cardio: S1, S2 heard, RRR, no murmurs  Resp: breath sounds heard bilaterally, no rales, wheezes or rhonchi  Abd: soft, NT, ND, + bowel sounds  Neuro: AAOx3, no obvious focal deficits  Ext: b/l LE edema consistent w/ lymphedema, improving erythema and cellulitis  Skin: warm, dry, no visible rash      Laboratory Studies:  CBC:                       10.8   9.59  )-----------( 257      ( 15 Manuel 2021 01:05 )             33.0     CMP: 01-15    134<L>  |  98  |  19  ----------------------------<  369<H>  3.8   |  32<H>  |  1.30    Ca    7.6<L>      15 Manuel 2021 01:05    TPro  6.3  /  Alb  2.2<L>  /  TBili  0.4  /  DBili  x   /  AST  19  /  ALT  22  /  AlkPhos  75  01-15    LIVER FUNCTIONS - ( 15 Manuel 2021 01:05 )  Alb: 2.2 g/dL / Pro: 6.3 g/dL / ALK PHOS: 75 U/L / ALT: 22 U/L / AST: 19 U/L / GGT: x             Microbiology: reviewed    Culture - Blood (collected 01-13-21 @ 00:59)  Source: .Blood Blood-Peripheral  Preliminary Report (01-14-21 @ 01:06):    No growth to date.    Culture - Blood (collected 01-13-21 @ 00:31)  Source: .Blood Blood-Peripheral  Preliminary Report (01-14-21 @ 01:06):    No growth to date.    Culture - Other (collected 01-13-21 @ 00:28)  Source: .Other right foot  Final Report (01-14-21 @ 16:27):    Rare Staphylococcus aureus    Moderate Streptococcus agalactiae (Group B) isolated    Group B streptococci are susceptible to ampicillin,    penicillin and cefazolin, but may be resistant to    erythromycin and clindamycin.    Recommendations for intrapartumprophylaxis for Group B    streptococci are penicillin or ampicillin.  Organism: Staphylococcus aureus (01-14-21 @ 16:27)  Organism: Staphylococcus aureus (01-14-21 @ 16:27)      -  Ampicillin/Sulbactam: S <=8/4      -  Cefazolin: S <=4      -  Clindamycin: S <=0.25      -  Erythromycin: S <=0.25      -  Gentamicin: S <=1 Should not be used as monotherapy      -  Oxacillin: S <=0.25      -  RIF- Rifampin: S <=1 Should not be used as monotherapy      -  Tetra/Doxy: S <=1      -  Trimethoprim/Sulfamethoxazole: S <=0.5/9.5      -  Vancomycin: S 2      Method Type: CATRACHITA        Radiology: reviewed

## 2021-01-15 NOTE — CONSULT NOTE ADULT - PROBLEM SELECTOR RECOMMENDATION 9
increase lantus 30 units qhs  change mod dose admelog scale coverage q6hrs while npo  goal bg 100-180 in hosp setting  to add standing pre-meal admelog when po intake resumes

## 2021-01-15 NOTE — PROGRESS NOTE ADULT - ASSESSMENT
Pt is a 70W w/ PMHx of HTN, DM2, LBBB (w/ neg stress, TTE 6/21 w/ mild AS), lymphedema presents to ED c/o RLE pain/swelling/redness x3 days. Admitted for RLE cellulitis, found to have foreign body in foot    RLE Cellulitis  Foreign body in foot  Fevers  Leukocytosis  -imaging reviewed  --Xray foot: Significant soft tissue swelling, predominantly involving the lower leg. No radiographic evidence of osteomyelitis.   Suspect linear foreign bodies in the plantar soft tissues at the level of metatarsals as described above.  -BCx NGTD  -WCx MSSA, Strep  -MRSA negative  -appreciate podiatry recs  --plan for I&D w/ foreign body removal TODAY  --please send any fluid recovered for culture to help guide Abx therapy  -trend fevers/leukocytosis  -c/w linezolid 600mg IV q12h  -d/c meropenem    Penicillin Allergy  -pt reports severe rash and hives as well as SOB and feelings of throat closing up w/ use of penicillins  -Would avoid use of penicillins/cephalosporins at this time  -Consider outpatient allergy referral for densensitization     DM2  -strict glucose control in setting of acute infection    Lymphedema  -c/w home lasix  -c/w limb elevation   DISPLAY PLAN FREE TEXT

## 2021-01-15 NOTE — PROGRESS NOTE ADULT - ASSESSMENT
Patient is 71 yo F PMHx HTN, DM2,  LBBB (w/ neg stress, TTE 6/21 w/ mild AS), lymphedema presents to ED c/o right lower leg pain/swelling and redness for the past 3 days. Admitted for RLE cellulitis, found to have foreign body in foot. Planned for I&D today.

## 2021-01-15 NOTE — BRIEF OPERATIVE NOTE - NSICDXBRIEFPOSTOP_GEN_ALL_CORE_FT
POST-OP DIAGNOSIS:  Wound of foot 15-Manuel-2021 13:16:11  Jairo Fleming  Foreign body in foot 15-Manuel-2021 13:16:01  Jairo Fleming

## 2021-01-15 NOTE — BRIEF OPERATIVE NOTE - NSICDXBRIEFPREOP_GEN_ALL_CORE_FT
PRE-OP DIAGNOSIS:  Wound of foot 15-Manuel-2021 13:15:23  Jairo Fleming  Foreign body in foot 15-Manuel-2021 13:15:11  Jairo Fleming

## 2021-01-15 NOTE — PROGRESS NOTE ADULT - SUBJECTIVE AND OBJECTIVE BOX
Patient is a 70y old  Female who presents with a chief complaint of RLE cellulitis, foreign body in foot (14 Jan 2021 16:40)      INTERVAL HPI/OVERNIGHT EVENTS: Patient seen and examined at bedside. No acute events overnight. Patient states she feels a little anxious about her procedure today, but otherwise feels well. Denies cp, sob, fevers, chills, pain, n/v.     MEDICATIONS  (STANDING):  atorvastatin 40 milliGRAM(s) Oral at bedtime  dextrose 40% Gel 15 Gram(s) Oral once  dextrose 5%. 1000 milliLiter(s) (50 mL/Hr) IV Continuous <Continuous>  dextrose 5%. 1000 milliLiter(s) (100 mL/Hr) IV Continuous <Continuous>  dextrose 50% Injectable 25 Gram(s) IV Push once  dextrose 50% Injectable 12.5 Gram(s) IV Push once  dextrose 50% Injectable 25 Gram(s) IV Push once  enalapril 20 milliGRAM(s) Oral daily  furosemide    Tablet 40 milliGRAM(s) Oral daily  glucagon  Injectable 1 milliGRAM(s) IntraMuscular once  insulin glargine Injectable (LANTUS) 21 Unit(s) SubCutaneous at bedtime  insulin lispro (ADMELOG) corrective regimen sliding scale   SubCutaneous every 6 hours  linezolid  IVPB 600 milliGRAM(s) IV Intermittent every 12 hours  meropenem  IVPB 1000 milliGRAM(s) IV Intermittent every 12 hours  metoprolol succinate ER 50 milliGRAM(s) Oral daily    MEDICATIONS  (PRN):  acetaminophen   Tablet .. 650 milliGRAM(s) Oral every 6 hours PRN Temp greater or equal to 38C (100.4F)      Allergies    penicillins (Hives)    Intolerances        REVIEW OF SYSTEMS:  CONSTITUTIONAL: No fever or chills  HEENT:  No headache, no sore throat  RESPIRATORY: No cough, wheezing, or shortness of breath  CARDIOVASCULAR: No chest pain, palpitations  GASTROINTESTINAL: No abd pain, nausea, vomiting, or diarrhea  GENITOURINARY: No dysuria, frequency, or hematuria  NEUROLOGICAL: no focal weakness or dizziness  MUSCULOSKELETAL: RLE with some more swelling than usual, admits chronic b/l LE edema; no myalgias     Vital Signs Last 24 Hrs  T(C): 37.2 (15 Manuel 2021 04:27), Max: 37.2 (15 Manuel 2021 04:27)  T(F): 99 (15 Manuel 2021 04:27), Max: 99 (15 Manuel 2021 04:27)  HR: 62 (15 Manuel 2021 04:27) (62 - 66)  BP: 152/78 (15 Manuel 2021 04:27) (138/61 - 155/52)  BP(mean): --  RR: 18 (15 Manuel 2021 04:27) (18 - 18)  SpO2: 92% (15 Manuel 2021 04:27) (92% - 94%)    PHYSICAL EXAM:  GENERAL: NAD  HEENT:  anicteric, moist mucous membranes  CHEST/LUNG:  CTA b/l, no rales, wheezes, or rhonchi  HEART:  RRR, S1, S2  ABDOMEN:  BS+, soft, nontender, nondistended  EXTREMITIES: +RLE with clean and dry dressing, +RLE erythema & edema, R calf size > L, b/l LE chronic venous stasis changes and lymphedema, unable to palpate b/l distal pulses, no tenderness   NERVOUS SYSTEM: answers questions and follows commands appropriately    LABS:                        10.8   9.59  )-----------( 257      ( 15 Manuel 2021 01:05 )             33.0     CBC Full  -  ( 15 Manuel 2021 01:05 )  WBC Count : 9.59 K/uL  Hemoglobin : 10.8 g/dL  Hematocrit : 33.0 %  Platelet Count - Automated : 257 K/uL  Mean Cell Volume : 89.9 fl  Mean Cell Hemoglobin : 29.4 pg  Mean Cell Hemoglobin Concentration : 32.7 gm/dL  Auto Neutrophil # : x  Auto Lymphocyte # : x  Auto Monocyte # : x  Auto Eosinophil # : x  Auto Basophil # : x  Auto Neutrophil % : x  Auto Lymphocyte % : x  Auto Monocyte % : x  Auto Eosinophil % : x  Auto Basophil % : x    15 Manuel 2021 01:05    134    |  98     |  19     ----------------------------<  369    3.8     |  32     |  1.30     Ca    7.6        15 Manuel 2021 01:05    TPro  6.3    /  Alb  2.2    /  TBili  0.4    /  DBili  x      /  AST  19     /  ALT  22     /  AlkPhos  75     15 Manuel 2021 01:05        CAPILLARY BLOOD GLUCOSE      POCT Blood Glucose.: 299 mg/dL (15 Manuel 2021 06:45)  POCT Blood Glucose.: 350 mg/dL (15 Manuel 2021 00:26)  POCT Blood Glucose.: 309 mg/dL (14 Jan 2021 20:49)  POCT Blood Glucose.: 318 mg/dL (14 Jan 2021 17:22)  POCT Blood Glucose.: 353 mg/dL (14 Jan 2021 11:43)        Culture - Blood (collected 01-13-21 @ 00:59)  Source: .Blood Blood-Peripheral  Preliminary Report (01-14-21 @ 01:06):    No growth to date.    Culture - Blood (collected 01-13-21 @ 00:31)  Source: .Blood Blood-Peripheral  Preliminary Report (01-14-21 @ 01:06):    No growth to date.    Culture - Other (collected 01-13-21 @ 00:28)  Source: .Other right foot  Final Report (01-14-21 @ 16:27):    Rare Staphylococcus aureus    Moderate Streptococcus agalactiae (Group B) isolated    Group B streptococci are susceptible to ampicillin,    penicillin and cefazolin, but may be resistant to    erythromycin and clindamycin.    Recommendations for intrapartumprophylaxis for Group B    streptococci are penicillin or ampicillin.  Organism: Staphylococcus aureus (01-14-21 @ 16:27)  Organism: Staphylococcus aureus (01-14-21 @ 16:27)      -  Ampicillin/Sulbactam: S <=8/4      -  Cefazolin: S <=4      -  Clindamycin: S <=0.25      -  Erythromycin: S <=0.25      -  Gentamicin: S <=1 Should not be used as monotherapy      -  Oxacillin: S <=0.25      -  RIF- Rifampin: S <=1 Should not be used as monotherapy      -  Tetra/Doxy: S <=1      -  Trimethoprim/Sulfamethoxazole: S <=0.5/9.5      -  Vancomycin: S 2      Method Type: CATRACHITA

## 2021-01-15 NOTE — CONSULT NOTE ADULT - SUBJECTIVE AND OBJECTIVE BOX
Patient is a 70y old  Female who presents with a chief complaint of RLE cellulitis, foreign body in foot (14 Jan 2021 16:40)      Reason For Consult: dm2 uncontrolled    HPI:  Patient is 69 yo F PMHx HTN, DM2, LBBB (w/ neg stress, TTE 6/21 w/ mild AS), lymphedema presents to ED c/o right lower leg pain/swelling and redness for the past 3 days. States redness and swelling got significant worse overnight and subsequently notified her podiatrist Dr. Mohan who recommended ED eval. States she had a previous history of cellulitis about 10 years which required "heavy duty antibiotics." Of note, patient also had a few episodes of NBNB emesis on Friday and Monday but attributes it to a salad she ate which wasn't fresh. At this time, patient is lying in bed, appears comfortable, states pain is well controlled.     In the ED, vitals were temp 98.8, HR 81, /57, RR 16, O2 98%  Labs were significant for WBC 15.83 with L shift, aPTT 26.9, PT 14.6, INR 1.26, glucose 1152, calcium 7.9, albumin 2.7  CXR: Grossly clear lungs  Xray tibia/fibula: Arthritic changes. No evidence of destructive changes below the knees.  Xray foot: Significant soft tissue swelling, predominantly involving the lower leg. No radiographic evidence of osteomyelitis.Suspect linear foreign bodies in the plantar soft tissues at the level of metatarsals as described above.  EKG: SR with 1 st degree AV block, known LBBB, 64bpm  S/p vanco x1, meropenem x1, NS 2200cc bolus, zofran x1 (12 Jan 2021 20:57)      PAST MEDICAL & SURGICAL HISTORY:  Lymphedema    Diabetes    Hypertension    Fractured skull  1968    H/O Achilles tendon repair  lengthened bilaterally, 2000    Frozen shoulder  2000    History of surgical removal of meniscus of knee  left in 1971    H/O cataract  2020        FAMILY HISTORY:  No pertinent family history in first degree relatives          Social History:    MEDICATIONS  (STANDING):  atorvastatin 40 milliGRAM(s) Oral at bedtime  dextrose 40% Gel 15 Gram(s) Oral once  dextrose 5%. 1000 milliLiter(s) (50 mL/Hr) IV Continuous <Continuous>  dextrose 5%. 1000 milliLiter(s) (100 mL/Hr) IV Continuous <Continuous>  dextrose 50% Injectable 25 Gram(s) IV Push once  dextrose 50% Injectable 12.5 Gram(s) IV Push once  dextrose 50% Injectable 25 Gram(s) IV Push once  enalapril 20 milliGRAM(s) Oral daily  furosemide    Tablet 40 milliGRAM(s) Oral daily  glucagon  Injectable 1 milliGRAM(s) IntraMuscular once  insulin glargine Injectable (LANTUS) 21 Unit(s) SubCutaneous at bedtime  insulin lispro (ADMELOG) corrective regimen sliding scale   SubCutaneous every 6 hours  linezolid  IVPB 600 milliGRAM(s) IV Intermittent every 12 hours  meropenem  IVPB 1000 milliGRAM(s) IV Intermittent every 12 hours  metoprolol succinate ER 50 milliGRAM(s) Oral daily    MEDICATIONS  (PRN):  acetaminophen   Tablet .. 650 milliGRAM(s) Oral every 6 hours PRN Temp greater or equal to 38C (100.4F)        T(C): 37.2 (01-15-21 @ 04:27), Max: 37.2 (01-15-21 @ 04:27)  HR: 62 (01-15-21 @ 04:27) (62 - 66)  BP: 152/78 (01-15-21 @ 04:27) (138/61 - 155/52)  RR: 18 (01-15-21 @ 04:27) (18 - 18)  SpO2: 92% (01-15-21 @ 04:27) (92% - 94%)  Wt(kg): --    PHYSICAL EXAM:  GENERAL: NAD, well-groomed, well-developed  HEAD:  Atraumatic, Normocephalic  NECK: Supple, No JVD, Normal thyroid  CHEST/LUNG: Clear to percussion bilaterally; No rales, rhonchi, wheezing, or rubs  HEART: Regular rate and rhythm; No murmurs, rubs, or gallops  ABDOMEN: Soft, Nontender, Nondistended; Bowel sounds present  EXTREMITIES:  right le erythema    CAPILLARY BLOOD GLUCOSE      POCT Blood Glucose.: 299 mg/dL (15 Manuel 2021 06:45)  POCT Blood Glucose.: 350 mg/dL (15 Manuel 2021 00:26)  POCT Blood Glucose.: 309 mg/dL (14 Jan 2021 20:49)  POCT Blood Glucose.: 318 mg/dL (14 Jan 2021 17:22)  POCT Blood Glucose.: 353 mg/dL (14 Jan 2021 11:43)                            10.8   9.59  )-----------( 257      ( 15 Manuel 2021 01:05 )             33.0       CMP:  01-15 @ 01:05  SGPT 22  Albumin 2.2   Alk Phos 75   Anion Gap 4   SGOT 19   Total Bili 0.4   BUN 19   Calcium Total 7.6   CO2 32   Chloride 98   Creatinine 1.30   eGFR if AA 48   eGFR if non AA 42   Glucose 369   Potassium 3.8   Protein 6.3   Sodium 134      Thyroid Function Tests:      Diabetes Tests:       Radiology:

## 2021-01-15 NOTE — PROGRESS NOTE ADULT - PROBLEM SELECTOR PLAN 3
-On lantus 35 units qhs and novolog sliding scale  - changed lantus 21  units qhs to 30u qhs per Endo red  - mod ISS  - Endo, Dr. Perlman, following: recs appreciated

## 2021-01-15 NOTE — PROGRESS NOTE ADULT - PROBLEM SELECTOR PLAN 1
-Patient with RLE swelling, pain, erythema x2-3 days  -WBC now wnl  -S/p vanco and meropenem  -Continue linezolid and meropenem as per ID  - Wound culture PRELIM with rare staph aureus and group B strep  - blood culturex2 NGTD  - Podiatry following, Dr. Lovell  - Plan for I&D today   - f/u bone scan, official further podiatry intervention pending bone scan results    - ID following, Dr. Chisholm

## 2021-01-15 NOTE — BRIEF OPERATIVE NOTE - NSICDXBRIEFPROCEDURE_GEN_ALL_CORE_FT
PROCEDURES:  Removal, foreign body, deep, foot 15-Manuel-2021 13:14:53  Jairo Fleming  Deep incision and drainage of multiple areas of foot 15-Manuel-2021 13:14:36  Jairo Fleming

## 2021-01-15 NOTE — PROGRESS NOTE ADULT - PROBLEM SELECTOR PLAN 2
-Plan for Right foot I&D w/ foreign body removal today  - Medically optimized for podiatry planned I&D  - Possible additional podiatry procedure pending bone scan  -Xray foot: Significant soft tissue swelling, predominantly involving the lower leg. No radiographic evidence of osteomyelitis. Suspect linear foreign bodies in the plantar soft tissues at the level of metatarsals as described above.  - Arterial Doppler- Flow could only seen in the anterior tibial artery however nonvisualization of the posterior tibial and peroneal arteries may be technical. Elevated velocities in the dorsalis pedis artery likely reflect underlying hemodynamically significant stenosis  -RENEE's within normal limits bilaterally  -Vascular, Dr. Hanson consulted - No vascular contraindication to podiatry procedure  - F/u bone scan  - wound culture prelim with rare staph aureus and group B strep   - Podiatry plan for procedure today

## 2021-01-16 LAB
ANION GAP SERPL CALC-SCNC: 6 MMOL/L — SIGNIFICANT CHANGE UP (ref 5–17)
BUN SERPL-MCNC: 15 MG/DL — SIGNIFICANT CHANGE UP (ref 7–23)
CALCIUM SERPL-MCNC: 8.2 MG/DL — LOW (ref 8.5–10.1)
CHLORIDE SERPL-SCNC: 99 MMOL/L — SIGNIFICANT CHANGE UP (ref 96–108)
CO2 SERPL-SCNC: 34 MMOL/L — HIGH (ref 22–31)
CREAT SERPL-MCNC: 1.2 MG/DL — SIGNIFICANT CHANGE UP (ref 0.5–1.3)
GLUCOSE SERPL-MCNC: 308 MG/DL — HIGH (ref 70–99)
HCT VFR BLD CALC: 37.5 % — SIGNIFICANT CHANGE UP (ref 34.5–45)
HGB BLD-MCNC: 12.1 G/DL — SIGNIFICANT CHANGE UP (ref 11.5–15.5)
MCHC RBC-ENTMCNC: 29.1 PG — SIGNIFICANT CHANGE UP (ref 27–34)
MCHC RBC-ENTMCNC: 32.3 GM/DL — SIGNIFICANT CHANGE UP (ref 32–36)
MCV RBC AUTO: 90.1 FL — SIGNIFICANT CHANGE UP (ref 80–100)
NRBC # BLD: 0 /100 WBCS — SIGNIFICANT CHANGE UP (ref 0–0)
PLATELET # BLD AUTO: 332 K/UL — SIGNIFICANT CHANGE UP (ref 150–400)
POTASSIUM SERPL-MCNC: 3.8 MMOL/L — SIGNIFICANT CHANGE UP (ref 3.5–5.3)
POTASSIUM SERPL-SCNC: 3.8 MMOL/L — SIGNIFICANT CHANGE UP (ref 3.5–5.3)
RBC # BLD: 4.16 M/UL — SIGNIFICANT CHANGE UP (ref 3.8–5.2)
RBC # FLD: 13.2 % — SIGNIFICANT CHANGE UP (ref 10.3–14.5)
SARS-COV-2 RNA SPEC QL NAA+PROBE: SIGNIFICANT CHANGE UP
SODIUM SERPL-SCNC: 139 MMOL/L — SIGNIFICANT CHANGE UP (ref 135–145)
WBC # BLD: 8.04 K/UL — SIGNIFICANT CHANGE UP (ref 3.8–10.5)
WBC # FLD AUTO: 8.04 K/UL — SIGNIFICANT CHANGE UP (ref 3.8–10.5)

## 2021-01-16 PROCEDURE — 99232 SBSQ HOSP IP/OBS MODERATE 35: CPT | Mod: GC

## 2021-01-16 PROCEDURE — 99024 POSTOP FOLLOW-UP VISIT: CPT

## 2021-01-16 RX ORDER — DEXTROSE 50 % IN WATER 50 %
15 SYRINGE (ML) INTRAVENOUS ONCE
Refills: 0 | Status: DISCONTINUED | OUTPATIENT
Start: 2021-01-16 | End: 2021-01-20

## 2021-01-16 RX ORDER — SODIUM CHLORIDE 9 MG/ML
1000 INJECTION, SOLUTION INTRAVENOUS
Refills: 0 | Status: DISCONTINUED | OUTPATIENT
Start: 2021-01-16 | End: 2021-01-20

## 2021-01-16 RX ORDER — DEXTROSE 50 % IN WATER 50 %
25 SYRINGE (ML) INTRAVENOUS ONCE
Refills: 0 | Status: DISCONTINUED | OUTPATIENT
Start: 2021-01-16 | End: 2021-01-20

## 2021-01-16 RX ORDER — INSULIN LISPRO 100/ML
VIAL (ML) SUBCUTANEOUS AT BEDTIME
Refills: 0 | Status: DISCONTINUED | OUTPATIENT
Start: 2021-01-16 | End: 2021-01-20

## 2021-01-16 RX ORDER — DEXTROSE 50 % IN WATER 50 %
12.5 SYRINGE (ML) INTRAVENOUS ONCE
Refills: 0 | Status: DISCONTINUED | OUTPATIENT
Start: 2021-01-16 | End: 2021-01-20

## 2021-01-16 RX ORDER — INSULIN LISPRO 100/ML
6 VIAL (ML) SUBCUTANEOUS
Refills: 0 | Status: DISCONTINUED | OUTPATIENT
Start: 2021-01-16 | End: 2021-01-16

## 2021-01-16 RX ORDER — GLUCAGON INJECTION, SOLUTION 0.5 MG/.1ML
1 INJECTION, SOLUTION SUBCUTANEOUS ONCE
Refills: 0 | Status: DISCONTINUED | OUTPATIENT
Start: 2021-01-16 | End: 2021-01-20

## 2021-01-16 RX ORDER — INSULIN LISPRO 100/ML
VIAL (ML) SUBCUTANEOUS
Refills: 0 | Status: DISCONTINUED | OUTPATIENT
Start: 2021-01-16 | End: 2021-01-20

## 2021-01-16 RX ORDER — INSULIN LISPRO 100/ML
6 VIAL (ML) SUBCUTANEOUS
Refills: 0 | Status: DISCONTINUED | OUTPATIENT
Start: 2021-01-16 | End: 2021-01-19

## 2021-01-16 RX ADMIN — Medication 40 MILLIGRAM(S): at 06:34

## 2021-01-16 RX ADMIN — Medication 4: at 08:06

## 2021-01-16 RX ADMIN — Medication 6: at 12:46

## 2021-01-16 RX ADMIN — HEPARIN SODIUM 7500 UNIT(S): 5000 INJECTION INTRAVENOUS; SUBCUTANEOUS at 06:42

## 2021-01-16 RX ADMIN — Medication 6: at 17:21

## 2021-01-16 RX ADMIN — LINEZOLID 300 MILLIGRAM(S): 600 INJECTION, SOLUTION INTRAVENOUS at 18:47

## 2021-01-16 RX ADMIN — INSULIN GLARGINE 30 UNIT(S): 100 INJECTION, SOLUTION SUBCUTANEOUS at 21:13

## 2021-01-16 RX ADMIN — LINEZOLID 300 MILLIGRAM(S): 600 INJECTION, SOLUTION INTRAVENOUS at 06:46

## 2021-01-16 RX ADMIN — Medication 50 MILLIGRAM(S): at 06:34

## 2021-01-16 RX ADMIN — ATORVASTATIN CALCIUM 40 MILLIGRAM(S): 80 TABLET, FILM COATED ORAL at 21:13

## 2021-01-16 RX ADMIN — Medication 20 MILLIGRAM(S): at 06:34

## 2021-01-16 RX ADMIN — HEPARIN SODIUM 7500 UNIT(S): 5000 INJECTION INTRAVENOUS; SUBCUTANEOUS at 21:13

## 2021-01-16 RX ADMIN — HEPARIN SODIUM 5000 UNIT(S): 5000 INJECTION INTRAVENOUS; SUBCUTANEOUS at 14:49

## 2021-01-16 RX ADMIN — Medication 6 UNIT(S): at 17:21

## 2021-01-16 NOTE — PROGRESS NOTE ADULT - ASSESSMENT
Pt is a 70W w/ PMHx of HTN, DM2, LBBB (w/ neg stress, TTE 6/21 w/ mild AS), lymphedema presents to ED c/o RLE pain/swelling/redness x3 days. Admitted for RLE cellulitis, found to have foreign body in foot    RLE Cellulitis/R foot OM  Foreign body in foot  Fevers-resolved  Leukocytosis-resolved  -imaging reviewed  NM suspicious for OM of 1st/2nd toe  -BCx NGTD  -WCx MSSA, Strep  1/15 OR TCx pending  -MRSA negative  S/p I&D and FB removal on 1/15  -appreciate podiatry recs re: results of NM scan  -trend fevers/leukocytosis  -c/w linezolid 600mg IV q12h while inpatient for now, however will have to switch to alternative therapy if pt to be tx for OM    Penicillin Allergy  -pt reports severe rash and hives as well as SOB and feelings of throat closing up w/ use of penicillins  -Would avoid use of penicillins/cephalosporins at this time  -Consider outpatient allergy referral for densensitization     DM2  -strict glucose control in setting of acute infection    Lymphedema  -c/w home lasix  -c/w limb elevation

## 2021-01-16 NOTE — PROGRESS NOTE ADULT - SUBJECTIVE AND OBJECTIVE BOX
CAPILLARY BLOOD GLUCOSE      POCT Blood Glucose.: 344 mg/dL (16 Jan 2021 07:35)  POCT Blood Glucose.: 331 mg/dL (15 Manuel 2021 22:39)  POCT Blood Glucose.: 335 mg/dL (15 Manuel 2021 18:01)  POCT Blood Glucose.: 349 mg/dL (15 Manuel 2021 16:44)  POCT Blood Glucose.: 273 mg/dL (15 Manuel 2021 12:59)      Vital Signs Last 24 Hrs  T(C): 37 (16 Jan 2021 05:57), Max: 37.3 (15 Manuel 2021 20:21)  T(F): 98.6 (16 Jan 2021 05:57), Max: 99.1 (15 Manuel 2021 20:21)  HR: 66 (16 Jan 2021 05:57) (62 - 70)  BP: 146/70 (16 Jan 2021 05:57) (97/46 - 151/63)  BP(mean): --  RR: 18 (16 Jan 2021 05:57) (15 - 21)  SpO2: 90% (16 Jan 2021 05:57) (90% - 96%)    Respiratory: CTA B/L  CV: RRR, S1S2, no murmurs, rubs or gallops  Abdominal: Soft, NT, ND +BS, Last BM  Extremities: No edema, + peripheral pulses     01-16    139  |  99  |  15  ----------------------------<  308<H>  3.8   |  34<H>  |  1.20    Ca    8.2<L>      16 Jan 2021 09:23    TPro  6.3  /  Alb  2.2<L>  /  TBili  0.4  /  DBili  x   /  AST  19  /  ALT  22  /  AlkPhos  75  01-15      atorvastatin 40 milliGRAM(s) Oral at bedtime  dextrose 40% Gel 15 Gram(s) Oral once  dextrose 50% Injectable 25 Gram(s) IV Push once  dextrose 50% Injectable 25 Gram(s) IV Push once  dextrose 50% Injectable 12.5 Gram(s) IV Push once  dextrose 50% Injectable 25 Gram(s) IV Push once  glucagon  Injectable 1 milliGRAM(s) IntraMuscular once  glucagon  Injectable 1 milliGRAM(s) IntraMuscular once  insulin glargine Injectable (LANTUS) 30 Unit(s) SubCutaneous at bedtime  insulin lispro (ADMELOG) corrective regimen sliding scale   SubCutaneous three times a day before meals  insulin lispro (ADMELOG) corrective regimen sliding scale   SubCutaneous at bedtime

## 2021-01-16 NOTE — PROGRESS NOTE ADULT - PROBLEM SELECTOR PLAN 1
cont lantus 30 units qhs  cont mod dose admelog scale coverage qac/qhs  add admelog 6 units 3x/day before meals  cont cons cho diet  goal bg 100-180 in hosp setting

## 2021-01-16 NOTE — PROGRESS NOTE ADULT - SUBJECTIVE AND OBJECTIVE BOX
[facility], Division of Infectious Diseases  JOE Slaughter Y. Patel, S. Shah  [phone]    Name: EVELYN LOPEZ  Age: 70y  Gender: Female  MRN: [id number]  Note Date: 01-16-21    Interval History:  Patient seen and examined at bedside this morning  No acute overnight events. Afebrile overnight  Notes reviewed  S/p I&D and foreign body removal 1/15 in the OR    Antibiotics:  linezolid  IVPB 600 milliGRAM(s) IV Intermittent every 12 hours      Medications:  acetaminophen   Tablet .. 650 milliGRAM(s) Oral every 6 hours PRN  atorvastatin 40 milliGRAM(s) Oral at bedtime  dextrose 40% Gel 15 Gram(s) Oral once  dextrose 5%. 1000 milliLiter(s) IV Continuous <Continuous>  dextrose 5%. 1000 milliLiter(s) IV Continuous <Continuous>  dextrose 50% Injectable 25 Gram(s) IV Push once  dextrose 50% Injectable 12.5 Gram(s) IV Push once  dextrose 50% Injectable 25 Gram(s) IV Push once  enalapril 20 milliGRAM(s) Oral daily  furosemide    Tablet 40 milliGRAM(s) Oral daily  glucagon  Injectable 1 milliGRAM(s) IntraMuscular once  insulin glargine Injectable (LANTUS) 21 Unit(s) SubCutaneous at bedtime  insulin lispro (ADMELOG) corrective regimen sliding scale   SubCutaneous every 6 hours  linezolid  IVPB 600 milliGRAM(s) IV Intermittent every 12 hours  meropenem  IVPB 1000 milliGRAM(s) IV Intermittent every 12 hours  metoprolol succinate ER 50 milliGRAM(s) Oral daily      Review of Systems:  A 10-point review of systems was obtained.     Pertinent positives and negatives--  Constitutional: No fevers. No Chills. No Rigors.   Cardiovascular: No chest pain. No palpitations.  Respiratory: No shortness of breath. No cough.  Gastrointestinal: No nausea or vomiting. No diarrhea or constipation.   Psychiatric: Pleasant. Appropriate affect.    Review of systems otherwise negative except as previously noted.    Allergies: penicillins (Hives)    For details regarding the patient's past medical history, social history, family history, and other miscellaneous elements, please refer the initial infectious diseases consultation and/or the admitting history and physical examination for this admission.    Objective:  Vital Signs Last 24 Hrs  T(C): 37 (16 Jan 2021 05:57), Max: 37.3 (15 Manuel 2021 20:21)  T(F): 98.6 (16 Jan 2021 05:57), Max: 99.1 (15 Manuel 2021 20:21)  HR: 66 (16 Jan 2021 05:57) (62 - 70)  BP: 146/70 (16 Jan 2021 05:57) (97/46 - 151/63)  BP(mean): --  RR: 18 (16 Jan 2021 05:57) (15 - 21)  SpO2: 90% (16 Jan 2021 05:57) (90% - 96%)    Physical Examination:  General: no acute distress  HEENT: NC/AT, EOMI, anicteric, no oral lesions  Neck: supple, no palpable LAD  Cardio: S1, S2 heard, RRR, no murmurs  Resp: breath sounds heard bilaterally, no rales, wheezes or rhonchi  Abd: soft, NT, ND, + bowel sounds  Neuro: AAOx3, no obvious focal deficits  Ext: b/l LE edema consistent w/ lymphedema, improving erythema and cellulitis  Skin: warm, dry, no visible rash      Laboratory Studies: reviewed  Microbiology: reviewed    Culture - Blood (collected 01-13-21 @ 00:59)  Source: .Blood Blood-Peripheral  Preliminary Report (01-14-21 @ 01:06):    No growth to date.    Culture - Blood (collected 01-13-21 @ 00:31)  Source: .Blood Blood-Peripheral  Preliminary Report (01-14-21 @ 01:06):    No growth to date.    Culture - Other (collected 01-13-21 @ 00:28)  Source: .Other right foot  Final Report (01-14-21 @ 16:27):    Rare Staphylococcus aureus    Moderate Streptococcus agalactiae (Group B) isolated    Group B streptococci are susceptible to ampicillin,    penicillin and cefazolin, but may be resistant to    erythromycin and clindamycin.    Recommendations for intrapartumprophylaxis for Group B    streptococci are penicillin or ampicillin.  Organism: Staphylococcus aureus (01-14-21 @ 16:27)  Organism: Staphylococcus aureus (01-14-21 @ 16:27)      -  Ampicillin/Sulbactam: S <=8/4      -  Cefazolin: S <=4      -  Clindamycin: S <=0.25      -  Erythromycin: S <=0.25      -  Gentamicin: S <=1 Should not be used as monotherapy      -  Oxacillin: S <=0.25      -  RIF- Rifampin: S <=1 Should not be used as monotherapy      -  Tetra/Doxy: S <=1      -  Trimethoprim/Sulfamethoxazole: S <=0.5/9.5      -  Vancomycin: S 2      Method Type: CATRACHITA        Radiology: reviewed  < from: NM Multiple Day Procedure (01.15.21 @ 09:55) >    EXAM:  NM MULTI DAY PROCEDURE                          EXAM:  NM INFLAMM LOC WBC SA SD                            PROCEDURE DATE:  01/15/2021          INTERPRETATION:  CLINICAL INFORMATION: 70-year-old female, with punctured wound on the plantar aspect of the 1st and 2nd digits of the right foot, referred to evaluate for osteomyelitis.    RADIOPHARMACEUTICAL:  11.0 mCi Tc labeled autologous leukocytes, I.V. injected on 1/14/2021.    TECHNIQUE: Multiple static images of both feet were obtained in the anterior, posterior, medial and lateral projections approximately 24 hours after radiotracer administration.    COMPARISON: No prior labeled leukocyte study available.    FINDINGS: There is labeled leukocyte accumulation on the plantar aspect ofthe first and second digits of the right foot distally.    IMPRESSION: Abnormal Tc labeled leukocyte scan:    Findings suspicious for osteomyelitis of the first and second digits of the right foot distally.                  OMAR VILLASENOR MD; Attending Nuclear Medicine  This document has been electronically signed. Manuel 15 2021 10:13AM    < end of copied text >  < from: Xray Foot AP + Lateral + Oblique, Right (01.15.21 @ 13:49) >    EXAM:  FOOT RIGHT (MINIMUM 3 VIEWS)                            PROCEDURE DATE:  01/15/2021          INTERPRETATION:   status post foreign body removal.    3 views right foot. Comparison 1/12/2021.      Impression:  Needlelike foreign body of the distal plantar right foot  reidentified without significant change in position. Osseous structures are essentially unchanged without focal bone destruction fracture or dislocation. Again noted marked soft tissue swelling may reflect cellulitis. Plantar ulceration again noted.                MARIAN KLINE MD; Attending Radiologist  This document has been electronically signed. Manuel 15 2021  2:05PM    < end of copied text >

## 2021-01-16 NOTE — PROGRESS NOTE ADULT - PROBLEM SELECTOR PLAN 2
Pt evaluated and chart reviewed  Wound cleansed w/ NS  Retention sutures intact  Adaptic placed over exposed flexor hallucis longus tendon  Wound draped and dressed with abd, DSD and ace  Wound vac running @ 100 mmHg   contacted and home vac paperwork completed    Podiatry Stable Pt evaluated and chart reviewed  Wound cleansed w/ NS  Retention sutures intact  Adaptic placed over exposed flexor hallucis longus tendon  Wound draped and dressed with abd, DSD and ace  Wound vac running @ 125 mmHg   contacted and home vac paperwork completed  Anticipated home vac arrival 1/19/21    Podiatry Stable

## 2021-01-16 NOTE — PROGRESS NOTE ADULT - SUBJECTIVE AND OBJECTIVE BOX
Patient is a 70y old  Female who presents with a chief complaint of RLE cellulitis, foreign body in foot (16 Jan 2021 12:08)    INTERVAL HPI/OVERNIGHT EVENTS: Patient seen and examined at bedside. No overnight events occurred. Pt with hardy and mild hematuria. Unable to obtain ROS with pt mental status.     MEDICATIONS  (STANDING):  atorvastatin 40 milliGRAM(s) Oral at bedtime  dextrose 40% Gel 15 Gram(s) Oral once  dextrose 5%. 1000 milliLiter(s) (50 mL/Hr) IV Continuous <Continuous>  dextrose 5%. 1000 milliLiter(s) (100 mL/Hr) IV Continuous <Continuous>  dextrose 50% Injectable 25 Gram(s) IV Push once  dextrose 50% Injectable 25 Gram(s) IV Push once  dextrose 50% Injectable 12.5 Gram(s) IV Push once  dextrose 50% Injectable 25 Gram(s) IV Push once  enalapril 20 milliGRAM(s) Oral daily  furosemide    Tablet 40 milliGRAM(s) Oral daily  glucagon  Injectable 1 milliGRAM(s) IntraMuscular once  glucagon  Injectable 1 milliGRAM(s) IntraMuscular once  heparin   Injectable 7500 Unit(s) SubCutaneous every 8 hours  insulin glargine Injectable (LANTUS) 30 Unit(s) SubCutaneous at bedtime  insulin lispro (ADMELOG) corrective regimen sliding scale   SubCutaneous three times a day before meals  insulin lispro (ADMELOG) corrective regimen sliding scale   SubCutaneous at bedtime  insulin lispro Injectable (ADMELOG) 6 Unit(s) SubCutaneous three times a day before meals  linezolid  IVPB 600 milliGRAM(s) IV Intermittent every 12 hours  metoprolol succinate ER 50 milliGRAM(s) Oral daily    MEDICATIONS  (PRN):      Allergies    penicillins (Hives)    Intolerances        REVIEW OF SYSTEMS:  CONSTITUTIONAL: No fever or chills  HEENT:  No headache, no sore throat  RESPIRATORY: No cough, wheezing, or shortness of breath  CARDIOVASCULAR: No chest pain, palpitations  GASTROINTESTINAL: No abd pain, nausea, vomiting, or diarrhea  GENITOURINARY: No dysuria, frequency, or hematuria  NEUROLOGICAL: no focal weakness or dizziness  MUSCULOSKELETAL: no myalgias     Vital Signs Last 24 Hrs  T(C): 37 (16 Jan 2021 05:57), Max: 37.3 (15 Manuel 2021 20:21)  T(F): 98.6 (16 Jan 2021 05:57), Max: 99.1 (15 Manuel 2021 20:21)  HR: 66 (16 Jan 2021 05:57) (62 - 70)  BP: 146/70 (16 Jan 2021 05:57) (97/46 - 151/63)  BP(mean): --  RR: 18 (16 Jan 2021 05:57) (15 - 21)  SpO2: 90% (16 Jan 2021 05:57) (90% - 96%)    PHYSICAL EXAM:  GENERAL: NAD  HEENT:  anicteric, moist mucous membranes  CHEST/LUNG:  CTA b/l, no rales, wheezes, or rhonchi  HEART:  RRR, S1, S2  ABDOMEN:  BS+, soft, nontender, nondistended  EXTREMITIES: no edema, cyanosis, or calf tenderness  NERVOUS SYSTEM: answers questions and follows commands appropriately    LABS:                        12.1   8.04  )-----------( 332      ( 16 Jan 2021 09:23 )             37.5     CBC Full  -  ( 16 Jan 2021 09:23 )  WBC Count : 8.04 K/uL  Hemoglobin : 12.1 g/dL  Hematocrit : 37.5 %  Platelet Count - Automated : 332 K/uL  Mean Cell Volume : 90.1 fl  Mean Cell Hemoglobin : 29.1 pg  Mean Cell Hemoglobin Concentration : 32.3 gm/dL  Auto Neutrophil # : x  Auto Lymphocyte # : x  Auto Monocyte # : x  Auto Eosinophil # : x  Auto Basophil # : x  Auto Neutrophil % : x  Auto Lymphocyte % : x  Auto Monocyte % : x  Auto Eosinophil % : x  Auto Basophil % : x    16 Jan 2021 09:23    139    |  99     |  15     ----------------------------<  308    3.8     |  34     |  1.20     Ca    8.2        16 Jan 2021 09:23      PT/INR - ( 15 Maunel 2021 08:06 )   PT: 13.4 sec;   INR: 1.15 ratio         PTT - ( 15 Manuel 2021 08:06 )  PTT:27.0 sec    CAPILLARY BLOOD GLUCOSE      POCT Blood Glucose.: 268 mg/dL (16 Jan 2021 12:10)  POCT Blood Glucose.: 344 mg/dL (16 Jan 2021 07:35)  POCT Blood Glucose.: 331 mg/dL (15 Manuel 2021 22:39)  POCT Blood Glucose.: 335 mg/dL (15 Manuel 2021 18:01)  POCT Blood Glucose.: 349 mg/dL (15 Manuel 2021 16:44)  POCT Blood Glucose.: 273 mg/dL (15 Manuel 2021 12:59)        Culture - Tissue with Gram Stain (collected 01-15-21 @ 19:03)  Source: .Tissue Other, right foot fibular sesmoid  Gram Stain (01-15-21 @ 22:20):    No polymorphonuclear cells seen per low power field    No organisms seen per oil power field    Culture - Tissue with Gram Stain (collected 01-15-21 @ 19:03)  Source: .Tissue Other, rigfht foot tibial sesmoid  Gram Stain (01-15-21 @ 22:19):    No polymorphonuclear cells seen per low power field    No organisms seen per oil power field    Culture - Tissue with Gram Stain (collected 01-15-21 @ 19:03)  Source: .Tissue Other, right foot soft tissue culture  Gram Stain (01-15-21 @ 22:20):    No polymorphonuclear cells seen per low power field    No organisms seen per oil power field    Culture - Blood (collected 01-13-21 @ 00:59)  Source: .Blood Blood-Peripheral  Preliminary Report (01-14-21 @ 01:06):    No growth to date.    Culture - Blood (collected 01-13-21 @ 00:31)  Source: .Blood Blood-Peripheral  Preliminary Report (01-14-21 @ 01:06):    No growth to date.    Culture - Other (collected 01-13-21 @ 00:28)  Source: .Other right foot  Final Report (01-14-21 @ 16:27):    Rare Staphylococcus aureus    Moderate Streptococcus agalactiae (Group B) isolated    Group B streptococci are susceptible to ampicillin,    penicillin and cefazolin, but may be resistant to    erythromycin and clindamycin.    Recommendations for intrapartumprophylaxis for Group B    streptococci are penicillin or ampicillin.  Organism: Staphylococcus aureus (01-14-21 @ 16:27)  Organism: Staphylococcus aureus (01-14-21 @ 16:27)      -  Ampicillin/Sulbactam: S <=8/4      -  Cefazolin: S <=4      -  Clindamycin: S <=0.25      -  Erythromycin: S <=0.25      -  Gentamicin: S <=1 Should not be used as monotherapy      -  Oxacillin: S <=0.25      -  RIF- Rifampin: S <=1 Should not be used as monotherapy      -  Tetra/Doxy: S <=1      -  Trimethoprim/Sulfamethoxazole: S <=0.5/9.5      -  Vancomycin: S 2      Method Type: CATRACHITA        RADIOLOGY & ADDITIONAL TESTS:    Personally reviewed.     Consultant(s) Notes Reviewed:  [x] YES  [ ] NO     Patient is a 70y old  Female who presents with a chief complaint of RLE cellulitis, foreign body in foot (16 Jan 2021 12:08)    INTERVAL HPI/OVERNIGHT EVENTS: Patient seen and examined at bedside. No overnight events occurred. Pt with RLE in dressing. Denies N/V/D, abdominal pain, chest pain, or SOB.    MEDICATIONS  (STANDING):  atorvastatin 40 milliGRAM(s) Oral at bedtime  dextrose 40% Gel 15 Gram(s) Oral once  dextrose 5%. 1000 milliLiter(s) (50 mL/Hr) IV Continuous <Continuous>  dextrose 5%. 1000 milliLiter(s) (100 mL/Hr) IV Continuous <Continuous>  dextrose 50% Injectable 25 Gram(s) IV Push once  dextrose 50% Injectable 25 Gram(s) IV Push once  dextrose 50% Injectable 12.5 Gram(s) IV Push once  dextrose 50% Injectable 25 Gram(s) IV Push once  enalapril 20 milliGRAM(s) Oral daily  furosemide    Tablet 40 milliGRAM(s) Oral daily  glucagon  Injectable 1 milliGRAM(s) IntraMuscular once  glucagon  Injectable 1 milliGRAM(s) IntraMuscular once  heparin   Injectable 7500 Unit(s) SubCutaneous every 8 hours  insulin glargine Injectable (LANTUS) 30 Unit(s) SubCutaneous at bedtime  insulin lispro (ADMELOG) corrective regimen sliding scale   SubCutaneous three times a day before meals  insulin lispro (ADMELOG) corrective regimen sliding scale   SubCutaneous at bedtime  insulin lispro Injectable (ADMELOG) 6 Unit(s) SubCutaneous three times a day before meals  linezolid  IVPB 600 milliGRAM(s) IV Intermittent every 12 hours  metoprolol succinate ER 50 milliGRAM(s) Oral daily    MEDICATIONS  (PRN):      Allergies    penicillins (Hives)    Intolerances        REVIEW OF SYSTEMS:  CONSTITUTIONAL: No fever or chills  HEENT:  No headache, no sore throat  RESPIRATORY: No cough, wheezing, or shortness of breath  CARDIOVASCULAR: No chest pain, palpitations  GASTROINTESTINAL: No abd pain, nausea, vomiting, or diarrhea  GENITOURINARY: No dysuria, frequency, or hematuria  NEUROLOGICAL: no focal weakness or dizziness  MUSCULOSKELETAL: chronic bilateral LE edema     Vital Signs Last 24 Hrs  T(C): 37 (16 Jan 2021 05:57), Max: 37.3 (15 Manuel 2021 20:21)  T(F): 98.6 (16 Jan 2021 05:57), Max: 99.1 (15 Manuel 2021 20:21)  HR: 66 (16 Jan 2021 05:57) (62 - 70)  BP: 146/70 (16 Jan 2021 05:57) (97/46 - 151/63)  BP(mean): --  RR: 18 (16 Jan 2021 05:57) (15 - 21)  SpO2: 90% (16 Jan 2021 05:57) (90% - 96%)    PHYSICAL EXAM:  GENERAL: NAD  HEENT:  anicteric, moist mucous membranes  CHEST/LUNG:  CTA b/l, no rales, wheezes, or rhonchi  HEART:  RRR, S1, S2  ABDOMEN:  BS+, soft, nontender, nondistended  EXTREMITIES: RLE in clean and dry dressing.  NERVOUS SYSTEM: answers questions and follows commands appropriately    LABS:                        12.1   8.04  )-----------( 332      ( 16 Jan 2021 09:23 )             37.5     CBC Full  -  ( 16 Jan 2021 09:23 )  WBC Count : 8.04 K/uL  Hemoglobin : 12.1 g/dL  Hematocrit : 37.5 %  Platelet Count - Automated : 332 K/uL  Mean Cell Volume : 90.1 fl  Mean Cell Hemoglobin : 29.1 pg  Mean Cell Hemoglobin Concentration : 32.3 gm/dL  Auto Neutrophil # : x  Auto Lymphocyte # : x  Auto Monocyte # : x  Auto Eosinophil # : x  Auto Basophil # : x  Auto Neutrophil % : x  Auto Lymphocyte % : x  Auto Monocyte % : x  Auto Eosinophil % : x  Auto Basophil % : x    16 Jan 2021 09:23    139    |  99     |  15     ----------------------------<  308    3.8     |  34     |  1.20     Ca    8.2        16 Jan 2021 09:23      PT/INR - ( 15 Manuel 2021 08:06 )   PT: 13.4 sec;   INR: 1.15 ratio         PTT - ( 15 Manuel 2021 08:06 )  PTT:27.0 sec    CAPILLARY BLOOD GLUCOSE      POCT Blood Glucose.: 268 mg/dL (16 Jan 2021 12:10)  POCT Blood Glucose.: 344 mg/dL (16 Jan 2021 07:35)  POCT Blood Glucose.: 331 mg/dL (15 Manuel 2021 22:39)  POCT Blood Glucose.: 335 mg/dL (15 Manuel 2021 18:01)  POCT Blood Glucose.: 349 mg/dL (15 Manuel 2021 16:44)  POCT Blood Glucose.: 273 mg/dL (15 Manuel 2021 12:59)        Culture - Tissue with Gram Stain (collected 01-15-21 @ 19:03)  Source: .Tissue Other, right foot fibular sesmoid  Gram Stain (01-15-21 @ 22:20):    No polymorphonuclear cells seen per low power field    No organisms seen per oil power field    Culture - Tissue with Gram Stain (collected 01-15-21 @ 19:03)  Source: .Tissue Other, rigfht foot tibial sesmoid  Gram Stain (01-15-21 @ 22:19):    No polymorphonuclear cells seen per low power field    No organisms seen per oil power field    Culture - Tissue with Gram Stain (collected 01-15-21 @ 19:03)  Source: .Tissue Other, right foot soft tissue culture  Gram Stain (01-15-21 @ 22:20):    No polymorphonuclear cells seen per low power field    No organisms seen per oil power field    Culture - Blood (collected 01-13-21 @ 00:59)  Source: .Blood Blood-Peripheral  Preliminary Report (01-14-21 @ 01:06):    No growth to date.    Culture - Blood (collected 01-13-21 @ 00:31)  Source: .Blood Blood-Peripheral  Preliminary Report (01-14-21 @ 01:06):    No growth to date.    Culture - Other (collected 01-13-21 @ 00:28)  Source: .Other right foot  Final Report (01-14-21 @ 16:27):    Rare Staphylococcus aureus    Moderate Streptococcus agalactiae (Group B) isolated    Group B streptococci are susceptible to ampicillin,    penicillin and cefazolin, but may be resistant to    erythromycin and clindamycin.    Recommendations for intrapartumprophylaxis for Group B    streptococci are penicillin or ampicillin.  Organism: Staphylococcus aureus (01-14-21 @ 16:27)  Organism: Staphylococcus aureus (01-14-21 @ 16:27)      -  Ampicillin/Sulbactam: S <=8/4      -  Cefazolin: S <=4      -  Clindamycin: S <=0.25      -  Erythromycin: S <=0.25      -  Gentamicin: S <=1 Should not be used as monotherapy      -  Oxacillin: S <=0.25      -  RIF- Rifampin: S <=1 Should not be used as monotherapy      -  Tetra/Doxy: S <=1      -  Trimethoprim/Sulfamethoxazole: S <=0.5/9.5      -  Vancomycin: S 2      Method Type: CATRACHITA        RADIOLOGY & ADDITIONAL TESTS:    Personally reviewed.     Consultant(s) Notes Reviewed:  [x] YES  [ ] NO

## 2021-01-16 NOTE — PROGRESS NOTE ADULT - ASSESSMENT
Cellulitis   Foreign body  S/P right foot OR debridement and foreign body removal by Dr. Lovell 1/16/21.

## 2021-01-16 NOTE — PROGRESS NOTE ADULT - PROBLEM SELECTOR PLAN 2
- s/p Right foot I&D w/ foreign body removal on 1/15  - Podiatry: No plan for procedure beyond I&D  - Bone scan with findings suspicious for osteomyelitis of the first and second digits of the right foot distally., official further podiatry intervention pending bone scan results    - Xray foot: Significant soft tissue swelling, predominantly involving the lower leg. No radiographic evidence of osteomyelitis. Suspect linear foreign bodies in the plantar soft tissues at the level of metatarsals as described above.  - Arterial Doppler- Flow could only seen in the anterior tibial artery however nonvisualization of the posterior tibial and peroneal arteries may be technical. Elevated velocities in the dorsalis pedis artery likely reflect underlying hemodynamically significant stenosis  -RENEE's within normal limits bilaterally  -Vascular, Dr. Hanson consulted, recs apprecaited  - wound culture with staph aureus, continue linezolid

## 2021-01-16 NOTE — PROGRESS NOTE ADULT - SUBJECTIVE AND OBJECTIVE BOX
The patient was interviewed and evaluated.    70y Female    T(C): 37 (01-16-21 @ 05:57), Max: 37.3 (01-15-21 @ 20:21)  HR: 66 (01-16-21 @ 05:57) (62 - 70)  BP: 146/70 (01-16-21 @ 05:57) (97/46 - 151/63)  RR: 18 (01-16-21 @ 05:57) (15 - 21)  SpO2: 90% (01-16-21 @ 05:57) (90% - 96%)  Wt(kg): --    No Nausea/vomiting. Tolerating diet well. Seen in bed and stating swelling on right leg is improved and felling of extreme warmth in extremity is resolving. Vital signs stable.     No anesthesia related complaints or sequelae.

## 2021-01-16 NOTE — PROGRESS NOTE ADULT - SUBJECTIVE AND OBJECTIVE BOX
70y year old Female seen at [facility] 1EAS 112 W1 for cellulites right foot.  S/P right foot OR debridement and foreign body removal by Dr. Lovell 1/16/21.  Dressing clean, dry and intact.  Denied chills, nausea, vomiting, chest pain, shortness of breath, or calf pain at this time.    Allergies    penicillins (Hives)    Intolerances        MEDICATIONS  (STANDING):  atorvastatin 40 milliGRAM(s) Oral at bedtime  dextrose 40% Gel 15 Gram(s) Oral once  dextrose 5%. 1000 milliLiter(s) (50 mL/Hr) IV Continuous <Continuous>  dextrose 5%. 1000 milliLiter(s) (100 mL/Hr) IV Continuous <Continuous>  dextrose 50% Injectable 25 Gram(s) IV Push once  dextrose 50% Injectable 25 Gram(s) IV Push once  dextrose 50% Injectable 12.5 Gram(s) IV Push once  dextrose 50% Injectable 25 Gram(s) IV Push once  enalapril 20 milliGRAM(s) Oral daily  furosemide    Tablet 40 milliGRAM(s) Oral daily  glucagon  Injectable 1 milliGRAM(s) IntraMuscular once  glucagon  Injectable 1 milliGRAM(s) IntraMuscular once  heparin   Injectable 7500 Unit(s) SubCutaneous every 8 hours  insulin glargine Injectable (LANTUS) 30 Unit(s) SubCutaneous at bedtime  insulin lispro (ADMELOG) corrective regimen sliding scale   SubCutaneous three times a day before meals  insulin lispro (ADMELOG) corrective regimen sliding scale   SubCutaneous at bedtime  insulin lispro Injectable (ADMELOG) 6 Unit(s) SubCutaneous three times a day before meals  linezolid  IVPB 600 milliGRAM(s) IV Intermittent every 12 hours  metoprolol succinate ER 50 milliGRAM(s) Oral daily    MEDICATIONS  (PRN):      Vital Signs Last 24 Hrs  T(C): 37 (16 Jan 2021 05:57), Max: 37.3 (15 Manuel 2021 20:21)  T(F): 98.6 (16 Jan 2021 05:57), Max: 99.1 (15 Manuel 2021 20:21)  HR: 66 (16 Jan 2021 05:57) (62 - 70)  BP: 146/70 (16 Jan 2021 05:57) (134/48 - 151/63)  BP(mean): --  RR: 18 (16 Jan 2021 05:57) (18 - 21)  SpO2: 90% (16 Jan 2021 05:57) (90% - 95%)    PHYSICAL EXAM:  Vascular: DP & PT nonpalpable bilaterally due to edema, Capillary refill 3 seconds.  Nonpitting edema to leg b/l.  2+ pitting edema to dorsal foot b/l  Neurological: Light touch sensation intact bilaterally  Musculoskeletal: POP plantar right forefoot   Dermatological: Wound (1)  Right foot sub 1st met head puncture wound.  Approximately 0.1 cm annular.  4 cc white purulence expressed.  PTB(+)      CBC Full  -  ( 16 Jan 2021 09:23 )  WBC Count : 8.04 K/uL  RBC Count : 4.16 M/uL  Hemoglobin : 12.1 g/dL  Hematocrit : 37.5 %  Platelet Count - Automated : 332 K/uL  Mean Cell Volume : 90.1 fl  Mean Cell Hemoglobin : 29.1 pg  Mean Cell Hemoglobin Concentration : 32.3 gm/dL  Auto Neutrophil # : x  Auto Lymphocyte # : x  Auto Monocyte # : x  Auto Eosinophil # : x  Auto Basophil # : x  Auto Neutrophil % : x  Auto Lymphocyte % : x  Auto Monocyte % : x  Auto Eosinophil % : x  Auto Basophil % : x      ----------CHEM PANEL----------    PT/INR - ( 15 Manuel 2021 08:06 )   PT: 13.4 sec;   INR: 1.15 ratio         PTT - ( 15 Manuel 2021 08:06 )  PTT:27.0 sec      Culture - Tissue with Gram Stain (collected 15 Manuel 2021 19:03)  Source: .Tissue Other, right foot fibular sesmoid  Gram Stain (15 Manuel 2021 22:20):    No polymorphonuclear cells seen per low power field    No organisms seen per oil power field    Culture - Tissue with Gram Stain (collected 15 Manuel 2021 19:03)  Source: .Tissue Other, rigfht foot tibial sesmoid  Gram Stain (15 Manuel 2021 22:19):    No polymorphonuclear cells seen per low power field    No organisms seen per oil power field    Culture - Tissue with Gram Stain (collected 15 Manuel 2021 19:03)  Source: .Tissue Other, right foot soft tissue culture  Gram Stain (15 Manuel 2021 22:20):    No polymorphonuclear cells seen per low power field    No organisms seen per oil power field        Imaging: ----------

## 2021-01-16 NOTE — PROGRESS NOTE ADULT - ASSESSMENT
Patient is 71 yo F PMHx HTN, DM2,  LBBB (w/ neg stress, TTE 6/21 w/ mild AS), lymphedema presents to ED c/o right lower leg pain/swelling and redness for the past 3 days. Admitted for RLE cellulitis, found to have foreign body in foot. S/p I&D on 1/15

## 2021-01-16 NOTE — PROGRESS NOTE ADULT - PROBLEM SELECTOR PLAN 1
-Patient with RLE swelling, pain, erythema x2-3 days  -WBC now wnl  -S/p vanco and meropenem  -Continue linezolid   - Wound culture with staph aureus  - blood culturex2 NGTD  - Podiatry following, Dr. Lovell  - S/p I&D on 1/15  - Bone scan with findings suspicious for osteomyelitis of the first and second digits of the right foot distally., official further podiatry intervention pending bone scan results    - ID following, Dr. Chisholm -Patient with RLE swelling, pain, erythema x2-3 days  -WBC now wnl  -S/p vanco and meropenem  -Continue linezolid   - Wound culture with staph aureus  - blood culturex2 NGTD  - Podiatry following, Dr. Lovell  - S/p I&D on 1/15  - Bone scan with findings suspicious for osteomyelitis of the first and second digits of the right foot distally., official further podiatry intervention pending bone scan results    - ID following, Dr. Chisholm  - F/u PT

## 2021-01-16 NOTE — PROGRESS NOTE ADULT - PROBLEM SELECTOR PLAN 3
- On lantus 35 units qhs at home  - changed lantus 21 units qhs to 30u qhs per Endo red  - Added admelog 6unit prior to meals   - mod ISS  - Endo, Dr. Perlman, following: recs appreciated

## 2021-01-17 ENCOUNTER — TRANSCRIPTION ENCOUNTER (OUTPATIENT)
Age: 71
End: 2021-01-17

## 2021-01-17 LAB
ALBUMIN SERPL ELPH-MCNC: 2.4 G/DL — LOW (ref 3.3–5)
ALP SERPL-CCNC: 73 U/L — SIGNIFICANT CHANGE UP (ref 40–120)
ALT FLD-CCNC: 20 U/L — SIGNIFICANT CHANGE UP (ref 12–78)
ANION GAP SERPL CALC-SCNC: 9 MMOL/L — SIGNIFICANT CHANGE UP (ref 5–17)
AST SERPL-CCNC: 23 U/L — SIGNIFICANT CHANGE UP (ref 15–37)
BILIRUB SERPL-MCNC: 0.5 MG/DL — SIGNIFICANT CHANGE UP (ref 0.2–1.2)
BUN SERPL-MCNC: 15 MG/DL — SIGNIFICANT CHANGE UP (ref 7–23)
CALCIUM SERPL-MCNC: 8.3 MG/DL — LOW (ref 8.5–10.1)
CHLORIDE SERPL-SCNC: 98 MMOL/L — SIGNIFICANT CHANGE UP (ref 96–108)
CO2 SERPL-SCNC: 32 MMOL/L — HIGH (ref 22–31)
CREAT SERPL-MCNC: 1 MG/DL — SIGNIFICANT CHANGE UP (ref 0.5–1.3)
GLUCOSE SERPL-MCNC: 216 MG/DL — HIGH (ref 70–99)
HCT VFR BLD CALC: 35.9 % — SIGNIFICANT CHANGE UP (ref 34.5–45)
HGB BLD-MCNC: 11.8 G/DL — SIGNIFICANT CHANGE UP (ref 11.5–15.5)
MCHC RBC-ENTMCNC: 29.3 PG — SIGNIFICANT CHANGE UP (ref 27–34)
MCHC RBC-ENTMCNC: 32.9 GM/DL — SIGNIFICANT CHANGE UP (ref 32–36)
MCV RBC AUTO: 89.1 FL — SIGNIFICANT CHANGE UP (ref 80–100)
NRBC # BLD: 0 /100 WBCS — SIGNIFICANT CHANGE UP (ref 0–0)
PLATELET # BLD AUTO: 345 K/UL — SIGNIFICANT CHANGE UP (ref 150–400)
POTASSIUM SERPL-MCNC: 3.4 MMOL/L — LOW (ref 3.5–5.3)
POTASSIUM SERPL-SCNC: 3.4 MMOL/L — LOW (ref 3.5–5.3)
PROT SERPL-MCNC: 6.7 G/DL — SIGNIFICANT CHANGE UP (ref 6–8.3)
RBC # BLD: 4.03 M/UL — SIGNIFICANT CHANGE UP (ref 3.8–5.2)
RBC # FLD: 13.1 % — SIGNIFICANT CHANGE UP (ref 10.3–14.5)
SODIUM SERPL-SCNC: 139 MMOL/L — SIGNIFICANT CHANGE UP (ref 135–145)
WBC # BLD: 9.38 K/UL — SIGNIFICANT CHANGE UP (ref 3.8–10.5)
WBC # FLD AUTO: 9.38 K/UL — SIGNIFICANT CHANGE UP (ref 3.8–10.5)

## 2021-01-17 PROCEDURE — 99232 SBSQ HOSP IP/OBS MODERATE 35: CPT | Mod: GC

## 2021-01-17 RX ORDER — CEFTRIAXONE 500 MG/1
INJECTION, POWDER, FOR SOLUTION INTRAMUSCULAR; INTRAVENOUS
Refills: 0 | Status: DISCONTINUED | OUTPATIENT
Start: 2021-01-17 | End: 2021-01-19

## 2021-01-17 RX ORDER — POTASSIUM CHLORIDE 20 MEQ
40 PACKET (EA) ORAL ONCE
Refills: 0 | Status: COMPLETED | OUTPATIENT
Start: 2021-01-17 | End: 2021-01-17

## 2021-01-17 RX ORDER — DIPHENHYDRAMINE HCL 50 MG
25 CAPSULE ORAL ONCE
Refills: 0 | Status: COMPLETED | OUTPATIENT
Start: 2021-01-17 | End: 2021-01-17

## 2021-01-17 RX ORDER — EPINEPHRINE 0.3 MG/.3ML
0.3 INJECTION INTRAMUSCULAR; SUBCUTANEOUS ONCE
Refills: 0 | Status: DISCONTINUED | OUTPATIENT
Start: 2021-01-17 | End: 2021-01-20

## 2021-01-17 RX ORDER — INSULIN GLARGINE 100 [IU]/ML
35 INJECTION, SOLUTION SUBCUTANEOUS AT BEDTIME
Refills: 0 | Status: DISCONTINUED | OUTPATIENT
Start: 2021-01-17 | End: 2021-01-20

## 2021-01-17 RX ORDER — CEFTRIAXONE 500 MG/1
1000 INJECTION, POWDER, FOR SOLUTION INTRAMUSCULAR; INTRAVENOUS ONCE
Refills: 0 | Status: COMPLETED | OUTPATIENT
Start: 2021-01-17 | End: 2021-01-17

## 2021-01-17 RX ORDER — CEFTRIAXONE 500 MG/1
1000 INJECTION, POWDER, FOR SOLUTION INTRAMUSCULAR; INTRAVENOUS EVERY 24 HOURS
Refills: 0 | Status: DISCONTINUED | OUTPATIENT
Start: 2021-01-18 | End: 2021-01-19

## 2021-01-17 RX ADMIN — Medication 6 UNIT(S): at 17:26

## 2021-01-17 RX ADMIN — Medication 6 UNIT(S): at 08:13

## 2021-01-17 RX ADMIN — Medication 50 MILLIGRAM(S): at 06:16

## 2021-01-17 RX ADMIN — LINEZOLID 300 MILLIGRAM(S): 600 INJECTION, SOLUTION INTRAVENOUS at 06:17

## 2021-01-17 RX ADMIN — ATORVASTATIN CALCIUM 40 MILLIGRAM(S): 80 TABLET, FILM COATED ORAL at 21:59

## 2021-01-17 RX ADMIN — Medication 101 MILLIGRAM(S): at 11:59

## 2021-01-17 RX ADMIN — Medication 6: at 08:13

## 2021-01-17 RX ADMIN — INSULIN GLARGINE 35 UNIT(S): 100 INJECTION, SOLUTION SUBCUTANEOUS at 21:59

## 2021-01-17 RX ADMIN — Medication 40 MILLIEQUIVALENT(S): at 14:40

## 2021-01-17 RX ADMIN — Medication 2: at 17:22

## 2021-01-17 RX ADMIN — HEPARIN SODIUM 7500 UNIT(S): 5000 INJECTION INTRAVENOUS; SUBCUTANEOUS at 14:39

## 2021-01-17 RX ADMIN — Medication 2: at 11:44

## 2021-01-17 RX ADMIN — Medication 0: at 22:01

## 2021-01-17 RX ADMIN — Medication 40 MILLIGRAM(S): at 06:16

## 2021-01-17 RX ADMIN — CEFTRIAXONE 100 MILLIGRAM(S): 500 INJECTION, POWDER, FOR SOLUTION INTRAMUSCULAR; INTRAVENOUS at 12:52

## 2021-01-17 RX ADMIN — Medication 20 MILLIGRAM(S): at 06:16

## 2021-01-17 RX ADMIN — HEPARIN SODIUM 7500 UNIT(S): 5000 INJECTION INTRAVENOUS; SUBCUTANEOUS at 21:59

## 2021-01-17 RX ADMIN — HEPARIN SODIUM 7500 UNIT(S): 5000 INJECTION INTRAVENOUS; SUBCUTANEOUS at 06:16

## 2021-01-17 RX ADMIN — Medication 6 UNIT(S): at 11:45

## 2021-01-17 NOTE — PROGRESS NOTE ADULT - SUBJECTIVE AND OBJECTIVE BOX
Patient is a 70y old  Female who presents with a chief complaint of RLE cellulitis, foreign body in foot (17 Jan 2021 10:25)      INTERVAL HPI/OVERNIGHT EVENTS: Patient seen and examined at bedside. No acute events overnight. Patient with wound vac. Feels well today. Denies cp, sob, fevers, chills, pain, n/v    MEDICATIONS  (STANDING):  atorvastatin 40 milliGRAM(s) Oral at bedtime  cefTRIAXone   IVPB      cefTRIAXone   IVPB 1000 milliGRAM(s) IV Intermittent once  dextrose 40% Gel 15 Gram(s) Oral once  dextrose 5%. 1000 milliLiter(s) (50 mL/Hr) IV Continuous <Continuous>  dextrose 5%. 1000 milliLiter(s) (100 mL/Hr) IV Continuous <Continuous>  dextrose 50% Injectable 25 Gram(s) IV Push once  dextrose 50% Injectable 25 Gram(s) IV Push once  dextrose 50% Injectable 12.5 Gram(s) IV Push once  dextrose 50% Injectable 25 Gram(s) IV Push once  enalapril 20 milliGRAM(s) Oral daily  furosemide    Tablet 40 milliGRAM(s) Oral daily  glucagon  Injectable 1 milliGRAM(s) IntraMuscular once  glucagon  Injectable 1 milliGRAM(s) IntraMuscular once  heparin   Injectable 7500 Unit(s) SubCutaneous every 8 hours  insulin glargine Injectable (LANTUS) 30 Unit(s) SubCutaneous at bedtime  insulin lispro (ADMELOG) corrective regimen sliding scale   SubCutaneous three times a day before meals  insulin lispro (ADMELOG) corrective regimen sliding scale   SubCutaneous at bedtime  insulin lispro Injectable (ADMELOG) 6 Unit(s) SubCutaneous three times a day before meals  metoprolol succinate ER 50 milliGRAM(s) Oral daily  potassium chloride    Tablet ER 40 milliEquivalent(s) Oral once    MEDICATIONS  (PRN):  EPINEPHrine     1 mG/mL Injectable 0.3 milliGRAM(s) IntraMuscular once PRN ANAPHYLAXIS ONLY      Allergies    penicillins (Hives)    Intolerances        REVIEW OF SYSTEMS:  CONSTITUTIONAL: No fever or chills  HEENT:  No headache, no sore throat  RESPIRATORY: No cough, wheezing, or shortness of breath  CARDIOVASCULAR: No chest pain, palpitations  GASTROINTESTINAL: No abd pain, nausea, vomiting, or diarrhea  GENITOURINARY: No dysuria, frequency, or hematuria  NEUROLOGICAL: no focal weakness or dizziness  MUSCULOSKELETAL: admits chronic b/l LE edema; no myalgias     Vital Signs Last 24 Hrs  T(C): 36.9 (17 Jan 2021 04:57), Max: 36.9 (16 Jan 2021 14:47)  T(F): 98.5 (17 Jan 2021 04:57), Max: 98.5 (17 Jan 2021 04:57)  HR: 67 (17 Jan 2021 04:57) (67 - 72)  BP: 132/61 (17 Jan 2021 04:57) (115/67 - 143/70)  BP(mean): --  RR: 18 (17 Jan 2021 04:57) (18 - 19)  SpO2: 91% (17 Jan 2021 04:57) (91% - 96%)    PHYSICAL EXAM:  GENERAL: NAD  HEENT:  anicteric, moist mucous membranes  CHEST/LUNG:  CTA b/l, no rales, wheezes, or rhonchi  HEART:  RRR, S1, S2  ABDOMEN:  BS+, soft, nontender, nondistended  EXTREMITIES: +RLE with clean and dry dressing, b/l LE chronic venous stasis changes and lymphedema, unable to palpate b/l distal pulses, no tenderness   NERVOUS SYSTEM: answers questions and follows commands appropriately      LABS:                        11.8   9.38  )-----------( 345      ( 17 Jan 2021 08:18 )             35.9     CBC Full  -  ( 17 Jan 2021 08:18 )  WBC Count : 9.38 K/uL  Hemoglobin : 11.8 g/dL  Hematocrit : 35.9 %  Platelet Count - Automated : 345 K/uL  Mean Cell Volume : 89.1 fl  Mean Cell Hemoglobin : 29.3 pg  Mean Cell Hemoglobin Concentration : 32.9 gm/dL  Auto Neutrophil # : x  Auto Lymphocyte # : x  Auto Monocyte # : x  Auto Eosinophil # : x  Auto Basophil # : x  Auto Neutrophil % : x  Auto Lymphocyte % : x  Auto Monocyte % : x  Auto Eosinophil % : x  Auto Basophil % : x    17 Jan 2021 08:18    139    |  98     |  15     ----------------------------<  216    3.4     |  32     |  1.00     Ca    8.3        17 Jan 2021 08:18    TPro  6.7    /  Alb  2.4    /  TBili  0.5    /  DBili  x      /  AST  23     /  ALT  20     /  AlkPhos  73     17 Jan 2021 08:18        CAPILLARY BLOOD GLUCOSE      POCT Blood Glucose.: 168 mg/dL (17 Jan 2021 11:34)  POCT Blood Glucose.: 254 mg/dL (17 Jan 2021 07:57)  POCT Blood Glucose.: 173 mg/dL (16 Jan 2021 21:11)  POCT Blood Glucose.: 282 mg/dL (16 Jan 2021 16:42)        Culture - Tissue with Gram Stain (collected 01-15-21 @ 19:03)  Source: .Tissue Other, right foot fibular sesmoid  Gram Stain (01-15-21 @ 22:20):    No polymorphonuclear cells seen per low power field    No organisms seen per oil power field  Preliminary Report (01-16-21 @ 18:17):    Rare Streptococcus agalactiae (Group B) isolated    Group B streptococci are susceptible to ampicillin,    penicillin and cefazolin, but may be resistant to    erythromycin and clindamycin.    Recommendations for intrapartum prophylaxis for Group B    streptococci are penicillin or ampicillin.    Culture - Tissue with Gram Stain (collected 01-15-21 @ 19:03)  Source: .Tissue Other, rigfht foot tibial sesmoid  Gram Stain (01-15-21 @ 22:19):    No polymorphonuclear cells seen per low power field    No organisms seen per oil power field  Preliminary Report (01-16-21 @ 18:09):    No growth to date.    Culture - Tissue with Gram Stain (collected 01-15-21 @ 19:03)  Source: .Tissue Other, right foot soft tissue culture  Gram Stain (01-15-21 @ 22:20):    No polymorphonuclear cells seen per low power field    No organisms seen per oil power field  Preliminary Report (01-16-21 @ 18:17):    Few Streptococcus agalactiae (Group B) isolated    Group B streptococci are susceptible to ampicillin,    penicillin and cefazolin, but may be resistant to    erythromycin and clindamycin.    Recommendations for intrapartum prophylaxis for Group B    streptococci are penicillin or ampicillin.    Culture - Blood (collected 01-13-21 @ 00:59)  Source: .Blood Blood-Peripheral  Preliminary Report (01-14-21 @ 01:06):    No growth to date.    Culture - Blood (collected 01-13-21 @ 00:31)  Source: .Blood Blood-Peripheral  Preliminary Report (01-14-21 @ 01:06):    No growth to date.    Culture - Other (collected 01-13-21 @ 00:28)  Source: .Other right foot  Final Report (01-14-21 @ 16:27):    Rare Staphylococcus aureus    Moderate Streptococcus agalactiae (Group B) isolated    Group B streptococci are susceptible to ampicillin,    penicillin and cefazolin, but may be resistant to    erythromycin and clindamycin.    Recommendations for intrapartumprophylaxis for Group B    streptococci are penicillin or ampicillin.  Organism: Staphylococcus aureus (01-14-21 @ 16:27)  Organism: Staphylococcus aureus (01-14-21 @ 16:27)      -  Ampicillin/Sulbactam: S <=8/4      -  Cefazolin: S <=4      -  Clindamycin: S <=0.25      -  Erythromycin: S <=0.25      -  Gentamicin: S <=1 Should not be used as monotherapy      -  Oxacillin: S <=0.25      -  RIF- Rifampin: S <=1 Should not be used as monotherapy      -  Tetra/Doxy: S <=1      -  Trimethoprim/Sulfamethoxazole: S <=0.5/9.5      -  Vancomycin: S 2      Method Type: CATRACHITA        RADIOLOGY & ADDITIONAL TESTS:    Personally reviewed.     Consultant(s) Notes Reviewed:  [x] YES  [ ] NO

## 2021-01-17 NOTE — PROGRESS NOTE ADULT - SUBJECTIVE AND OBJECTIVE BOX
CAPILLARY BLOOD GLUCOSE      POCT Blood Glucose.: 168 mg/dL (17 Jan 2021 11:34)  POCT Blood Glucose.: 254 mg/dL (17 Jan 2021 07:57)  POCT Blood Glucose.: 173 mg/dL (16 Jan 2021 21:11)  POCT Blood Glucose.: 282 mg/dL (16 Jan 2021 16:42)      Vital Signs Last 24 Hrs  T(C): 36.9 (17 Jan 2021 04:57), Max: 36.9 (16 Jan 2021 14:47)  T(F): 98.5 (17 Jan 2021 04:57), Max: 98.5 (17 Jan 2021 04:57)  HR: 67 (17 Jan 2021 04:57) (67 - 72)  BP: 132/61 (17 Jan 2021 04:57) (115/67 - 143/70)  BP(mean): --  RR: 18 (17 Jan 2021 04:57) (18 - 19)  SpO2: 91% (17 Jan 2021 04:57) (91% - 96%)    General: WN/WD NAD  Respiratory: CTA B/L  CV: RRR, S1S2, no murmurs, rubs or gallops  Abdominal: Soft, NT, ND +BS, Last BM  Extremities: No edema, + peripheral pulses     01-17    139  |  98  |  15  ----------------------------<  216<H>  3.4<L>   |  32<H>  |  1.00    Ca    8.3<L>      17 Jan 2021 08:18    TPro  6.7  /  Alb  2.4<L>  /  TBili  0.5  /  DBili  x   /  AST  23  /  ALT  20  /  AlkPhos  73  01-17      atorvastatin 40 milliGRAM(s) Oral at bedtime  dextrose 40% Gel 15 Gram(s) Oral once  dextrose 50% Injectable 25 Gram(s) IV Push once  dextrose 50% Injectable 25 Gram(s) IV Push once  dextrose 50% Injectable 12.5 Gram(s) IV Push once  dextrose 50% Injectable 25 Gram(s) IV Push once  glucagon  Injectable 1 milliGRAM(s) IntraMuscular once  glucagon  Injectable 1 milliGRAM(s) IntraMuscular once  insulin glargine Injectable (LANTUS) 30 Unit(s) SubCutaneous at bedtime  insulin lispro (ADMELOG) corrective regimen sliding scale   SubCutaneous three times a day before meals  insulin lispro (ADMELOG) corrective regimen sliding scale   SubCutaneous at bedtime  insulin lispro Injectable (ADMELOG) 6 Unit(s) SubCutaneous three times a day before meals

## 2021-01-17 NOTE — DISCHARGE NOTE PROVIDER - NSDCCPCAREPLAN_GEN_ALL_CORE_FT
PRINCIPAL DISCHARGE DIAGNOSIS  Diagnosis: Cellulitis of right lower extremity  Assessment and Plan of Treatment: You had redness and swelling in your right foot. On imaging you were found to have foreign bodies in your foot and a possible bone infection called osteomyelitis. You were seen by vascular and podiatry. You had a right foot debridement and foreign body removal on 1/16. You were also started on IV antibiotics for your infection.      SECONDARY DISCHARGE DIAGNOSES  Diagnosis: Diabetes  Assessment and Plan of Treatment: Continue your home diabetes mediations and insulin at night    Diagnosis: Foreign body (FB) in soft tissue  Assessment and Plan of Treatment:      PRINCIPAL DISCHARGE DIAGNOSIS  Diagnosis: Foot osteomyelitis  Assessment and Plan of Treatment: please continue with your IV antibiotic until 2/25/21. Follow up with infectious disease doctor on 2/8/21 to be arranged as a tele health visit.      SECONDARY DISCHARGE DIAGNOSES  Diagnosis: Diabetes  Assessment and Plan of Treatment: Continue your home diabetes mediations and insulin at night    Diagnosis: Foreign body (FB) in soft tissue  Assessment and Plan of Treatment: You have a wound vac. VNS services arranged for wound managent

## 2021-01-17 NOTE — PROGRESS NOTE ADULT - SUBJECTIVE AND OBJECTIVE BOX
[facility], Division of Infectious Diseases  JOE Slaughter Y. Patel, S. Shah  [phone]    Name: EVELYN LOPEZ  Age: 70y  Gender: Female  MRN: [id number]  Note Date: 01-17-21    Interval History:  Patient seen and examined at bedside this morning  No acute overnight events.   Notes reviewed    Antibiotics:  linezolid  IVPB 600 milliGRAM(s) IV Intermittent every 12 hours      Medications:  atorvastatin 40 milliGRAM(s) Oral at bedtime  dextrose 40% Gel 15 Gram(s) Oral once  dextrose 5%. 1000 milliLiter(s) IV Continuous <Continuous>  dextrose 5%. 1000 milliLiter(s) IV Continuous <Continuous>  dextrose 50% Injectable 25 Gram(s) IV Push once  dextrose 50% Injectable 25 Gram(s) IV Push once  dextrose 50% Injectable 12.5 Gram(s) IV Push once  dextrose 50% Injectable 25 Gram(s) IV Push once  enalapril 20 milliGRAM(s) Oral daily  furosemide    Tablet 40 milliGRAM(s) Oral daily  glucagon  Injectable 1 milliGRAM(s) IntraMuscular once  glucagon  Injectable 1 milliGRAM(s) IntraMuscular once  heparin   Injectable 7500 Unit(s) SubCutaneous every 8 hours  insulin glargine Injectable (LANTUS) 30 Unit(s) SubCutaneous at bedtime  insulin lispro (ADMELOG) corrective regimen sliding scale   SubCutaneous three times a day before meals  insulin lispro (ADMELOG) corrective regimen sliding scale   SubCutaneous at bedtime  insulin lispro Injectable (ADMELOG) 6 Unit(s) SubCutaneous three times a day before meals  linezolid  IVPB 600 milliGRAM(s) IV Intermittent every 12 hours  metoprolol succinate ER 50 milliGRAM(s) Oral daily  potassium chloride    Tablet ER 40 milliEquivalent(s) Oral once      Review of Systems:  A 10-point review of systems was obtained.     Pertinent positives and negatives--  Constitutional: No fevers. No Chills. No Rigors.   Cardiovascular: No chest pain. No palpitations.  Respiratory: No shortness of breath. No cough.  Gastrointestinal: No nausea or vomiting. No diarrhea or constipation.   Psychiatric: Pleasant. Appropriate affect.    Review of systems otherwise negative except as previously noted.    Allergies: penicillins (Hives)    For details regarding the patient's past medical history, social history, family history, and other miscellaneous elements, please refer the initial infectious diseases consultation and/or the admitting history and physical examination for this admission.    Objective:  Vitals:   T(C): 36.9 (01-17-21 @ 04:57), Max: 36.9 (01-16-21 @ 14:47)  HR: 67 (01-17-21 @ 04:57) (67 - 72)  BP: 132/61 (01-17-21 @ 04:57) (115/67 - 143/70)  RR: 18 (01-17-21 @ 04:57) (18 - 19)  SpO2: 91% (01-17-21 @ 04:57) (91% - 96%)    Physical Examination:  General: no acute distress  HEENT: NC/AT, EOMI, anicteric, no oral lesions  Neck: supple, no palpable LAD  Cardio: S1, S2 heard, RRR, no murmurs  Resp: breath sounds heard bilaterally, no rales, wheezes or rhonchi  Abd: soft, NT, ND, + bowel sounds  Neuro: AAOx3, no obvious focal deficits  Ext: no edema or cyanosis  Skin: warm, dry, no visible rash  Lines:    Laboratory Studies:  CBC:                       11.8   9.38  )-----------( 345      ( 17 Jan 2021 08:18 )             35.9     CMP: 01-17    139  |  98  |  15  ----------------------------<  216<H>  3.4<L>   |  32<H>  |  1.00    Ca    8.3<L>      17 Jan 2021 08:18    TPro  6.7  /  Alb  2.4<L>  /  TBili  0.5  /  DBili  x   /  AST  23  /  ALT  20  /  AlkPhos  73  01-17    LIVER FUNCTIONS - ( 17 Jan 2021 08:18 )  Alb: 2.4 g/dL / Pro: 6.7 g/dL / ALK PHOS: 73 U/L / ALT: 20 U/L / AST: 23 U/L / GGT: x             Microbiology: reviewed    Culture - Tissue with Gram Stain (collected 01-15-21 @ 19:03)  Source: .Tissue Other, right foot fibular sesmoid  Gram Stain (01-15-21 @ 22:20):    No polymorphonuclear cells seen per low power field    No organisms seen per oil power field  Preliminary Report (01-16-21 @ 18:17):    Rare Streptococcus agalactiae (Group B) isolated    Group B streptococci are susceptible to ampicillin,    penicillin and cefazolin, but may be resistant to    erythromycin and clindamycin.    Recommendations for intrapartum prophylaxis for Group B    streptococci are penicillin or ampicillin.    Culture - Tissue with Gram Stain (collected 01-15-21 @ 19:03)  Source: .Tissue Other, rigfht foot tibial sesmoid  Gram Stain (01-15-21 @ 22:19):    No polymorphonuclear cells seen per low power field    No organisms seen per oil power field  Preliminary Report (01-16-21 @ 18:09):    No growth to date.    Culture - Tissue with Gram Stain (collected 01-15-21 @ 19:03)  Source: .Tissue Other, right foot soft tissue culture  Gram Stain (01-15-21 @ 22:20):    No polymorphonuclear cells seen per low power field    No organisms seen per oil power field  Preliminary Report (01-16-21 @ 18:17):    Few Streptococcus agalactiae (Group B) isolated    Group B streptococci are susceptible to ampicillin,    penicillin and cefazolin, but may be resistant to    erythromycin and clindamycin.    Recommendations for intrapartum prophylaxis for Group B    streptococci are penicillin or ampicillin.    Culture - Blood (collected 01-13-21 @ 00:59)  Source: .Blood Blood-Peripheral  Preliminary Report (01-14-21 @ 01:06):    No growth to date.    Culture - Blood (collected 01-13-21 @ 00:31)  Source: .Blood Blood-Peripheral  Preliminary Report (01-14-21 @ 01:06):    No growth to date.    Culture - Other (collected 01-13-21 @ 00:28)  Source: .Other right foot  Final Report (01-14-21 @ 16:27):    Rare Staphylococcus aureus    Moderate Streptococcus agalactiae (Group B) isolated    Group B streptococci are susceptible to ampicillin,    penicillin and cefazolin, but may be resistant to    erythromycin and clindamycin.    Recommendations for intrapartumprophylaxis for Group B    streptococci are penicillin or ampicillin.  Organism: Staphylococcus aureus (01-14-21 @ 16:27)  Organism: Staphylococcus aureus (01-14-21 @ 16:27)      -  Ampicillin/Sulbactam: S <=8/4      -  Cefazolin: S <=4      -  Clindamycin: S <=0.25      -  Erythromycin: S <=0.25      -  Gentamicin: S <=1 Should not be used as monotherapy      -  Oxacillin: S <=0.25      -  RIF- Rifampin: S <=1 Should not be used as monotherapy      -  Tetra/Doxy: S <=1      -  Trimethoprim/Sulfamethoxazole: S <=0.5/9.5      -  Vancomycin: S 2      Method Type: CATRACHITA        Radiology: reviewed

## 2021-01-17 NOTE — PHYSICAL THERAPY INITIAL EVALUATION ADULT - GAIT TRAINING, PT EVAL
Patient will ambulate x 50 feet with SAC independent in 2 weeks in order to increase mobility at home

## 2021-01-17 NOTE — PHYSICAL THERAPY INITIAL EVALUATION ADULT - PERTINENT HX OF CURRENT PROBLEM, REHAB EVAL
Patient is 71 yo F PMHx HTN, DM2, LBBB (w/ neg stress, TTE 6/21 w/ mild AS), lymphedema presents to ED c/o right lower leg pain/swelling and redness for the past 3 days. States redness and swelling got significant worse overnight and subsequently notified her podiatrist Dr. Mohan who recommended ED eval.

## 2021-01-17 NOTE — DISCHARGE NOTE PROVIDER - NSDCFUADDINST_GEN_ALL_CORE_FT
Please schedule your appointment with Dr. Rangel/Nas at the wound care center within the next 5 days Please schedule your appointment with Dr. Rangel/Nas at the wound care center within the next 5 days.  Please follow up with your Primary care doctor in 2 weeks

## 2021-01-17 NOTE — PROGRESS NOTE ADULT - ASSESSMENT
Pt is a 70W w/ PMHx of HTN, DM2, LBBB (w/ neg stress, TTE 6/21 w/ mild AS), lymphedema presents to ED c/o RLE pain/swelling/redness x3 days. Admitted for RLE cellulitis, found to have foreign body in foot    RLE Cellulitis/R foot OM  Foreign body in foot  Fevers-resolved  Leukocytosis-resolved  -imaging reviewed.  suspicious for OM of 1st/2nd toe  -BCx NGTD  -WCx MSSA, Strep  1/15 OR TCx GBS   S/p I&D and FB removal on 1/15  -appreciate podiatry recs re: results of NM scan if any intervention planned  Spoke to pt, not interested in other surgery.   At this time would plan for LT IV Abx for tx of OM.   Linezolid not an option for LT Abx given side effects of pancytopenia. Vancomycin also not currently an option as dosing for therapeutic levels will be difficult to manage as outpatient  At this time, will trial patient w/ cephalosporins. There is <1% cross-reactivity w/ penicillins.   Will premedicate with benadryl and administer 1st dose ceftriaxone 2gm today.   If pt tolerates today, will trial off benadryl tomorrow.   Plan discussed with patient and she is agreeable.     Penicillin Allergy  -pt reports severe rash and hives as well as SOB and feelings of throat closing up w/ use of penicillins  Trialing therapy as above    DM2  -strict glucose control in setting of acute infection    Lymphedema  -c/w home lasix  -c/w limb elevation

## 2021-01-17 NOTE — DISCHARGE NOTE PROVIDER - CARE PROVIDER_API CALL
Felipa Rahman)  Internal Medicine  [address] [place] [address], [address]  [place] [address]  Phone: [phone]  Fax: [phone]  Scheduled Appointment: 02/08/2021    Ruddy Rangel (ZANEM)  Podiatric Medicine and Surgery  [address] [place] [address]  Phone: [phone]  Fax: [phone]  Follow Up Time: 1 week

## 2021-01-17 NOTE — PROGRESS NOTE ADULT - PROBLEM SELECTOR PLAN 1
cont lantus 30 units qhs  cont admelog 6 units 3x/day before meals  cont mod dose admelog scale coverage qac/qhs  cont cons cho diet  goal bg 100-180 in hosp setting

## 2021-01-17 NOTE — DISCHARGE NOTE PROVIDER - HOSPITAL COURSE
FROM ADMISSION H+P:   HPI:  Patient is 69 yo F PMHx HTN, DM2, LBBB (w/ neg stress, TTE 6/21 w/ mild AS), lymphedema presents to ED c/o right lower leg pain/swelling and redness for the past 3 days. States redness and swelling got significant worse overnight and subsequently notified her podiatrist Dr. Mohan who recommended ED eval. States she had a previous history of cellulitis about 10 years which required "heavy duty antibiotics." Of note, patient also had a few episodes of NBNB emesis on Friday and Monday but attributes it to a salad she ate which wasn't fresh. At this time, patient is lying in bed, appears comfortable, states pain is well controlled.     In the ED, vitals were temp 98.8, HR 81, /57, RR 16, O2 98%  Labs were significant for WBC 15.83 with L shift, aPTT 26.9, PT 14.6, INR 1.26, glucose 1152, calcium 7.9, albumin 2.7  CXR: Grossly clear lungs  Xray tibia/fibula: Arthritic changes. No evidence of destructive changes below the knees.  Xray foot: Significant soft tissue swelling, predominantly involving the lower leg. No radiographic evidence of osteomyelitis.Suspect linear foreign bodies in the plantar soft tissues at the level of metatarsals as described above.  EKG: SR with 1 st degree AV block, known LBBB, 64bpm  S/p vanco x1, meropenem x1, NS 2200cc bolus, zofran x1 (12 Jan 2021 20:57)    ---  HOSPITAL COURSE: Pt with erythematous and edematous RLE, was found to have foreign bodies in the plantar soft tissue of her RLE. Pt was evaluated by podiatry and I&D planned. She was evaluated by vascular prior to procedure and had an arterial doppler that showed flow only in the anterior tibial artery however nonvisualization of the posterior tibial and peroneal arteries may be technical. Elevated velocities in the dorsalis pedis artery likely reflect underlying hemodynamically significant stenosis. Pt also had RENEE's within normal limits bilaterally. Pt also had a bone scan that was suspicious for osteomyelitis. Patient was seen by ID and started on IV antibiotics for osteomyelitis. Pt had right foot debridement and foreign body removal on 1/16/2021. Patient with wound vac on R foot. During hospitalization patient also had elevated blood glucose levels for which Dr. Perlman from endocrinology saw the patient and adjusted the insulin regimen. Pt seen by PT __________ . Pt is medically optimized for discharge home with close outpatient followup.      ---  CONSULTANTS:   Podiatry- Dr. Lovell  Vascular- Dr. Hanson  Endocrine- Dr. Perlman   ID- Dr. Rahman   FROM ADMISSION H+P:   HPI:  Patient is 71 yo F PMHx HTN, DM2, LBBB (w/ neg stress, TTE 6/21 w/ mild AS), lymphedema presents to ED c/o right lower leg pain/swelling and redness for the past 3 days. States redness and swelling got significant worse overnight and subsequently notified her podiatrist Dr. Mohan who recommended ED eval. States she had a previous history of cellulitis about 10 years which required "heavy duty antibiotics." Of note, patient also had a few episodes of NBNB emesis on Friday and Monday but attributes it to a salad she ate which wasn't fresh. At this time, patient is lying in bed, appears comfortable, states pain is well controlled.     In the ED, vitals were temp 98.8, HR 81, /57, RR 16, O2 98%  Labs were significant for WBC 15.83 with L shift, aPTT 26.9, PT 14.6, INR 1.26, glucose 1152, calcium 7.9, albumin 2.7  CXR: Grossly clear lungs  Xray tibia/fibula: Arthritic changes. No evidence of destructive changes below the knees.  Xray foot: Significant soft tissue swelling, predominantly involving the lower leg. No radiographic evidence of osteomyelitis.Suspect linear foreign bodies in the plantar soft tissues at the level of metatarsals as described above.  EKG: SR with 1 st degree AV block, known LBBB, 64bpm  S/p vanco x1, meropenem x1, NS 2200cc bolus, zofran x1 (12 Jan 2021 20:57)    ---  HOSPITAL COURSE: Pt with erythematous and edematous RLE, was found to have foreign bodies in the plantar soft tissue of her RLE. Pt was evaluated by podiatry and I&D planned. She was evaluated by vascular prior to procedure and had an arterial doppler that showed flow only in the anterior tibial artery ; this was followed up with RENEE's that were within normal limits bilaterally. Pt also had a bone scan that was suspicious for osteomyelitis. Patient was seen by ID and started on IV antibiotics for osteomyelitis. Pt had right foot debridement and foreign body removal on 1/16/2021. Patient with wound vac on R foot. During hospitalization patient also had elevated blood glucose levels for which Dr. Perlman from endocrinology saw the patient and adjusted the insulin regimen. Pt is s/p PICC line and will receive IV rocephin until 2/25/21. Pt is medically optimized for discharge home with close outpatient followup.      ---  CONSULTANTS:   Podiatry- Dr. Lovell  Vascular- Dr. Hanson  Endocrine- Dr. Perlman   ID- Dr. Rahman   FROM ADMISSION H+P:   HPI:  Patient is 71 yo F PMHx HTN, DM2, LBBB (w/ neg stress, TTE 6/21 w/ mild AS), lymphedema presents to ED c/o right lower leg pain/swelling and redness for the past 3 days. States redness and swelling got significant worse overnight and subsequently notified her podiatrist Dr. Mohan who recommended ED eval. States she had a previous history of cellulitis about 10 years which required "heavy duty antibiotics." Of note, patient also had a few episodes of NBNB emesis on Friday and Monday but attributes it to a salad she ate which wasn't fresh. At this time, patient is lying in bed, appears comfortable, states pain is well controlled.     In the ED, vitals were temp 98.8, HR 81, /57, RR 16, O2 98%  Labs were significant for WBC 15.83 with L shift, aPTT 26.9, PT 14.6, INR 1.26, glucose 1152, calcium 7.9, albumin 2.7  CXR: Grossly clear lungs  Xray tibia/fibula: Arthritic changes. No evidence of destructive changes below the knees.  Xray foot: Significant soft tissue swelling, predominantly involving the lower leg. No radiographic evidence of osteomyelitis.Suspect linear foreign bodies in the plantar soft tissues at the level of metatarsals as described above.  EKG: SR with 1 st degree AV block, known LBBB, 64bpm  S/p vanco x1, meropenem x1, NS 2200cc bolus, zofran x1 (12 Jan 2021 20:57)    ---  HOSPITAL COURSE: Pt with erythematous and edematous RLE, was found to have foreign bodies in the plantar soft tissue of her RLE. Pt was evaluated by podiatry and I&D planned. She was evaluated by vascular prior to procedure and had an arterial doppler that showed flow only in the anterior tibial artery ; this was followed up with RENEE's that were within normal limits bilaterally. Pt also had a bone scan that was suspicious for osteomyelitis. Patient was seen by ID and started on IV antibiotics for osteomyelitis. Pt had right foot debridement and foreign body removal on 1/16/2021. Patient with wound vac on R foot. During hospitalization patient also had elevated blood glucose levels for which Dr. Perlman from endocrinology saw the patient and adjusted the insulin regimen. Pt is s/p PICC line and will receive IV rocephin until 2/25/21. Pt is medically optimized for discharge home with close outpatient followup.        CONSULTANTS:   Podiatry- Dr. Lovell  Vascular- Dr. Hanson  Endocrine- Dr. Perlman   ID- Dr. Rahman

## 2021-01-17 NOTE — PROGRESS NOTE ADULT - PROBLEM SELECTOR PLAN 1
-Patient with RLE swelling, pain, erythema x2-3 days  -WBC now wnl  -S/p vanco and meropenem  - s/p linezolid   - Pt will be started on ceftriaxone today, premedicated with benadryl  - Wound culture with staph aureus  - blood culturex2 NGTD  - Podiatry following, Dr. Lovell  - S/p I&D on 1/15  - Bone scan with findings suspicious for osteomyelitis of the first and second digits of the right foot distally., official further podiatry intervention pending bone scan results    - ID following, Dr. Chisholm  - F/u PT

## 2021-01-17 NOTE — DISCHARGE NOTE PROVIDER - NSDCMRMEDTOKEN_GEN_ALL_CORE_FT
aspirin 81 mg oral tablet, chewable: 1 tab(s) orally once a day  atorvastatin 40 mg oral tablet: 1 tab(s) orally once a day  enalapril 20 mg oral tablet: 1 tab(s) orally once a day  furosemide 40 mg oral tablet: 1 tab(s) orally once a day  Klor-Con 10 mEq oral tablet, extended release: 1 tab(s) orally once a day  Lantus: 35 unit(s) subcutaneous once a day (at bedtime)  Metoprolol Succinate ER 50 mg oral tablet, extended release: 1 tab(s) orally once a day  NovoLOG: patient&#x27;s own sliding scale   aspirin 81 mg oral tablet, chewable: 1 tab(s) orally once a day  atorvastatin 40 mg oral tablet: 1 tab(s) orally once a day  cefTRIAXone 2 g/50 mL-iso-osmotic dextrose intravenous solution: 2 gram(s) intravenously once a day via PICC line until 2/25/2021.   Please fax weekly BMP,CBC, ESR, CRP to [phone].   Ok to pull PICC line once antibiotics are completed  enalapril 20 mg oral tablet: 1 tab(s) orally once a day  furosemide 40 mg oral tablet: 1 tab(s) orally once a day  Klor-Con 10 mEq oral tablet, extended release: 1 tab(s) orally once a day  Lantus: 35 unit(s) subcutaneous once a day (at bedtime)  Metoprolol Succinate ER 50 mg oral tablet, extended release: 1 tab(s) orally once a day  NovoLOG: patient&#x27;s own sliding scale

## 2021-01-17 NOTE — PROGRESS NOTE ADULT - PROBLEM SELECTOR PLAN 2
- s/p Right foot I&D w/ foreign body removal on 1/15  - Podiatry: No plan for procedure beyond I&D  - Bone scan with findings suspicious for osteomyelitis of the first and second digits of the right foot distally., official further podiatry intervention pending bone scan results    - Xray foot: Significant soft tissue swelling, predominantly involving the lower leg. No radiographic evidence of osteomyelitis. Suspect linear foreign bodies in the plantar soft tissues at the level of metatarsals as described above.  - Arterial Doppler- Flow could only seen in the anterior tibial artery however nonvisualization of the posterior tibial and peroneal arteries may be technical. Elevated velocities in the dorsalis pedis artery likely reflect underlying hemodynamically significant stenosis  -RENEE's within normal limits bilaterally  -Vascular, Dr. Hanson consulted, recs apprecaited  - wound culture with staph aureus, start ceftriaxone today  - Pt with wound vac, pt to receive home wound vac on 19th

## 2021-01-17 NOTE — DISCHARGE NOTE PROVIDER - PROVIDER TOKENS
PROVIDER:[TOKEN:[[id number]],SCHEDULEDAPPT:[02/08/2021]],PROVIDER:[TOKEN:[[id number]],FOLLOWUP:[1 week]]

## 2021-01-18 ENCOUNTER — TRANSCRIPTION ENCOUNTER (OUTPATIENT)
Age: 71
End: 2021-01-18

## 2021-01-18 LAB
ANION GAP SERPL CALC-SCNC: 7 MMOL/L — SIGNIFICANT CHANGE UP (ref 5–17)
BUN SERPL-MCNC: 14 MG/DL — SIGNIFICANT CHANGE UP (ref 7–23)
CALCIUM SERPL-MCNC: 8.2 MG/DL — LOW (ref 8.5–10.1)
CHLORIDE SERPL-SCNC: 100 MMOL/L — SIGNIFICANT CHANGE UP (ref 96–108)
CO2 SERPL-SCNC: 32 MMOL/L — HIGH (ref 22–31)
CREAT SERPL-MCNC: 1.1 MG/DL — SIGNIFICANT CHANGE UP (ref 0.5–1.3)
CULTURE RESULTS: SIGNIFICANT CHANGE UP
CULTURE RESULTS: SIGNIFICANT CHANGE UP
GLUCOSE SERPL-MCNC: 174 MG/DL — HIGH (ref 70–99)
HCT VFR BLD CALC: 38 % — SIGNIFICANT CHANGE UP (ref 34.5–45)
HGB BLD-MCNC: 12.4 G/DL — SIGNIFICANT CHANGE UP (ref 11.5–15.5)
MCHC RBC-ENTMCNC: 29.3 PG — SIGNIFICANT CHANGE UP (ref 27–34)
MCHC RBC-ENTMCNC: 32.6 GM/DL — SIGNIFICANT CHANGE UP (ref 32–36)
MCV RBC AUTO: 89.8 FL — SIGNIFICANT CHANGE UP (ref 80–100)
NRBC # BLD: 0 /100 WBCS — SIGNIFICANT CHANGE UP (ref 0–0)
PLATELET # BLD AUTO: 357 K/UL — SIGNIFICANT CHANGE UP (ref 150–400)
POTASSIUM SERPL-MCNC: 3.4 MMOL/L — LOW (ref 3.5–5.3)
POTASSIUM SERPL-SCNC: 3.4 MMOL/L — LOW (ref 3.5–5.3)
RBC # BLD: 4.23 M/UL — SIGNIFICANT CHANGE UP (ref 3.8–5.2)
RBC # FLD: 13.3 % — SIGNIFICANT CHANGE UP (ref 10.3–14.5)
SODIUM SERPL-SCNC: 139 MMOL/L — SIGNIFICANT CHANGE UP (ref 135–145)
SPECIMEN SOURCE: SIGNIFICANT CHANGE UP
SPECIMEN SOURCE: SIGNIFICANT CHANGE UP
WBC # BLD: 9.09 K/UL — SIGNIFICANT CHANGE UP (ref 3.8–10.5)
WBC # FLD AUTO: 9.09 K/UL — SIGNIFICANT CHANGE UP (ref 3.8–10.5)

## 2021-01-18 PROCEDURE — 99232 SBSQ HOSP IP/OBS MODERATE 35: CPT | Mod: GC

## 2021-01-18 PROCEDURE — 99024 POSTOP FOLLOW-UP VISIT: CPT

## 2021-01-18 RX ORDER — POTASSIUM CHLORIDE 20 MEQ
40 PACKET (EA) ORAL EVERY 4 HOURS
Refills: 0 | Status: COMPLETED | OUTPATIENT
Start: 2021-01-18 | End: 2021-01-18

## 2021-01-18 RX ADMIN — ATORVASTATIN CALCIUM 40 MILLIGRAM(S): 80 TABLET, FILM COATED ORAL at 22:16

## 2021-01-18 RX ADMIN — HEPARIN SODIUM 7500 UNIT(S): 5000 INJECTION INTRAVENOUS; SUBCUTANEOUS at 22:17

## 2021-01-18 RX ADMIN — Medication 2: at 12:31

## 2021-01-18 RX ADMIN — Medication 40 MILLIEQUIVALENT(S): at 18:15

## 2021-01-18 RX ADMIN — HEPARIN SODIUM 7500 UNIT(S): 5000 INJECTION INTRAVENOUS; SUBCUTANEOUS at 14:59

## 2021-01-18 RX ADMIN — Medication 6 UNIT(S): at 17:20

## 2021-01-18 RX ADMIN — Medication 20 MILLIGRAM(S): at 05:20

## 2021-01-18 RX ADMIN — Medication 40 MILLIGRAM(S): at 05:20

## 2021-01-18 RX ADMIN — Medication 6 UNIT(S): at 12:33

## 2021-01-18 RX ADMIN — HEPARIN SODIUM 7500 UNIT(S): 5000 INJECTION INTRAVENOUS; SUBCUTANEOUS at 05:20

## 2021-01-18 RX ADMIN — Medication 2: at 17:19

## 2021-01-18 RX ADMIN — Medication 6 UNIT(S): at 08:15

## 2021-01-18 RX ADMIN — Medication 50 MILLIGRAM(S): at 05:20

## 2021-01-18 RX ADMIN — Medication 40 MILLIEQUIVALENT(S): at 22:17

## 2021-01-18 RX ADMIN — CEFTRIAXONE 100 MILLIGRAM(S): 500 INJECTION, POWDER, FOR SOLUTION INTRAMUSCULAR; INTRAVENOUS at 12:39

## 2021-01-18 RX ADMIN — Medication 2: at 08:14

## 2021-01-18 RX ADMIN — INSULIN GLARGINE 35 UNIT(S): 100 INJECTION, SOLUTION SUBCUTANEOUS at 22:17

## 2021-01-18 NOTE — DISCHARGE NOTE NURSING/CASE MANAGEMENT/SOCIAL WORK - NSSCCARECORD_GEN_ALL_CORE
Home Care Agency/Durable Medical Equipment Agency Home Care Agency/Durable Medical Equipment Agency/Community [facility]

## 2021-01-18 NOTE — DISCHARGE NOTE NURSING/CASE MANAGEMENT/SOCIAL WORK - PATIENT PORTAL LINK FT
You can access the FollowMyHealth Patient Portal offered by [facility] by registering at the following website: [email or web address]. By joining [employer]’s FollowMyHealth portal, you will also be able to view your health information using other applications (apps) compatible with our system.

## 2021-01-18 NOTE — PROGRESS NOTE ADULT - SUBJECTIVE AND OBJECTIVE BOX
CAPILLARY BLOOD GLUCOSE      POCT Blood Glucose.: 181 mg/dL (18 Jan 2021 07:35)  POCT Blood Glucose.: 173 mg/dL (17 Jan 2021 21:14)  POCT Blood Glucose.: 172 mg/dL (17 Jan 2021 16:22)  POCT Blood Glucose.: 168 mg/dL (17 Jan 2021 11:34)  POCT Blood Glucose.: 254 mg/dL (17 Jan 2021 07:57)      Vital Signs Last 24 Hrs  T(C): 36.8 (18 Jan 2021 05:04), Max: 36.9 (17 Jan 2021 14:37)  T(F): 98.3 (18 Jan 2021 05:04), Max: 98.4 (17 Jan 2021 14:37)  HR: 76 (18 Jan 2021 05:04) (69 - 82)  BP: 112/68 (18 Jan 2021 05:04) (112/68 - 131/69)  BP(mean): --  RR: 18 (18 Jan 2021 05:04) (18 - 19)  SpO2: 98% (18 Jan 2021 05:04) (95% - 98%)      Respiratory: CTA B/L  CV: RRR, S1S2, no murmurs, rubs or gallops  Abdominal: Soft, NT, ND +BS, Last BM  Extremities: No edema, + peripheral pulses     01-17    139  |  98  |  15  ----------------------------<  216<H>  3.4<L>   |  32<H>  |  1.00    Ca    8.3<L>      17 Jan 2021 08:18    TPro  6.7  /  Alb  2.4<L>  /  TBili  0.5  /  DBili  x   /  AST  23  /  ALT  20  /  AlkPhos  73  01-17      atorvastatin 40 milliGRAM(s) Oral at bedtime  dextrose 40% Gel 15 Gram(s) Oral once  dextrose 50% Injectable 25 Gram(s) IV Push once  dextrose 50% Injectable 25 Gram(s) IV Push once  dextrose 50% Injectable 12.5 Gram(s) IV Push once  dextrose 50% Injectable 25 Gram(s) IV Push once  glucagon  Injectable 1 milliGRAM(s) IntraMuscular once  glucagon  Injectable 1 milliGRAM(s) IntraMuscular once  insulin glargine Injectable (LANTUS) 35 Unit(s) SubCutaneous at bedtime  insulin lispro (ADMELOG) corrective regimen sliding scale   SubCutaneous three times a day before meals  insulin lispro (ADMELOG) corrective regimen sliding scale   SubCutaneous at bedtime  insulin lispro Injectable (ADMELOG) 6 Unit(s) SubCutaneous three times a day before meals

## 2021-01-18 NOTE — PROGRESS NOTE ADULT - PROBLEM SELECTOR PLAN 2
Pt evaluated and chart reviewed  Wound vac running @ 125 mmHg   contacted ref home vac  Anticipated home vac arrival 1/19/21    Podiatry Stable  Podiatry will follow pt while in house Pt evaluated and chart reviewed  Wound vac running @ 125 mmHg   contacted ref home vac  Anticipated home vac arrival 1/19/21    Podiatry Stable  Podiatry will follow pt while in house    Wound Care Instructions:   1.  Keep dressing clean, dry and intact   2.  Make an appointment within 5/d of d/c at [facility] with Dr. Rangel or Dr. Lovell   3.  If n/v/f/c/sob/cp present, go to emergency department immediately    Wound vac instructions:  1. Leave dressing clean, dry and intact until vac change  2. Change wound vac every 3 days  3. Maintain vac at 100 mmHg  4. If vac stops running for 2 hours or more then change entire vac dressing.

## 2021-01-18 NOTE — PROGRESS NOTE ADULT - PROBLEM SELECTOR PLAN 1
cont lantus 35 units qhs  cont admelog 6 units 3x/day before meals  cont mod dose scale coverage qac/qhs  goal bg 100-180 in hosp setting  cont cons cho diet

## 2021-01-18 NOTE — PROGRESS NOTE ADULT - PROBLEM SELECTOR PLAN 1
-Patient with RLE swelling, pain, erythema x2-3 days  -S/p vanco and meropenem, s/p linezolid   - Pt will be started on ceftriaxone today, premedicated with benadryl  - Wound culture with staph aureus, blood culturex2 NGTD  - Podiatry following, Dr. Lovell  - S/p I&D on 1/15  - Bone scan with findings suspicious for osteomyelitis of the first and second digits of the right foot distally., official further podiatry intervention pending bone scan results    - ID following, Dr. Chisholm  - F/u PT  - Pending pathology from bone collected. Per ID , pt should had picc line placed for 6 wks antibiotics. NM scan + for osteomyelitis. Per ID, even if bone path negative, this cannot r/o osteom completely as the remaining toe cold still have infection.

## 2021-01-18 NOTE — PROGRESS NOTE ADULT - SUBJECTIVE AND OBJECTIVE BOX
Patient is a 70y old  Female who presents with a chief complaint of RLE cellulitis, foreign body in foot (18 Jan 2021 13:13)      INTERVAL HPI/OVERNIGHT EVENTS: Patient seen and examined at bedside. No overnight events occurred. Patient has no complaints at this time. Denies fevers, chills, headache, lightheadedness, chest pain, dyspnea, abdominal pain, n/v/d/c.  Tolerated antibiotic trial yesterday    MEDICATIONS  (STANDING):  atorvastatin 40 milliGRAM(s) Oral at bedtime  cefTRIAXone   IVPB      cefTRIAXone   IVPB 1000 milliGRAM(s) IV Intermittent every 24 hours  dextrose 40% Gel 15 Gram(s) Oral once  dextrose 5%. 1000 milliLiter(s) (50 mL/Hr) IV Continuous <Continuous>  dextrose 5%. 1000 milliLiter(s) (100 mL/Hr) IV Continuous <Continuous>  dextrose 50% Injectable 25 Gram(s) IV Push once  dextrose 50% Injectable 25 Gram(s) IV Push once  dextrose 50% Injectable 12.5 Gram(s) IV Push once  dextrose 50% Injectable 25 Gram(s) IV Push once  enalapril 20 milliGRAM(s) Oral daily  furosemide    Tablet 40 milliGRAM(s) Oral daily  glucagon  Injectable 1 milliGRAM(s) IntraMuscular once  glucagon  Injectable 1 milliGRAM(s) IntraMuscular once  heparin   Injectable 7500 Unit(s) SubCutaneous every 8 hours  insulin glargine Injectable (LANTUS) 35 Unit(s) SubCutaneous at bedtime  insulin lispro (ADMELOG) corrective regimen sliding scale   SubCutaneous three times a day before meals  insulin lispro (ADMELOG) corrective regimen sliding scale   SubCutaneous at bedtime  insulin lispro Injectable (ADMELOG) 6 Unit(s) SubCutaneous three times a day before meals  metoprolol succinate ER 50 milliGRAM(s) Oral daily  potassium chloride    Tablet ER 40 milliEquivalent(s) Oral every 4 hours    MEDICATIONS  (PRN):  EPINEPHrine     1 mG/mL Injectable 0.3 milliGRAM(s) IntraMuscular once PRN ANAPHYLAXIS ONLY      Allergies    penicillins (Hives)    Intolerances        REVIEW OF SYSTEMS:  CONSTITUTIONAL: No fever or chills  HEENT:  No headache, no sore throat  RESPIRATORY: No cough, wheezing, or shortness of breath  CARDIOVASCULAR: No chest pain, palpitations  GASTROINTESTINAL: No abd pain, nausea, vomiting, or diarrhea  GENITOURINARY: No dysuria, frequency, or hematuria  NEUROLOGICAL: no focal weakness or dizziness  MUSCULOSKELETAL: no myalgias, +chronic b/L LE edema    Vital Signs Last 24 Hrs  T(C): 36.8 (18 Jan 2021 13:49), Max: 36.8 (18 Jan 2021 05:04)  T(F): 98.3 (18 Jan 2021 13:49), Max: 98.3 (18 Jan 2021 05:04)  HR: 78 (18 Jan 2021 13:49) (76 - 82)  BP: 101/53 (18 Jan 2021 13:49) (101/53 - 128/63)  BP(mean): --  RR: 18 (18 Jan 2021 13:49) (18 - 19)  SpO2: 92% (18 Jan 2021 13:49) (92% - 98%)      PHYSICAL EXAM:  GENERAL: NAD  HEENT:  anicteric, moist mucous membranes  CHEST/LUNG:  CTA b/l, no rales, wheezes, or rhonchi  HEART:  RRR, S1, S2  ABDOMEN:  BS+, soft, nontender, nondistended  EXTREMITIES: +RLE with clean and dry dressing, b/l LE chronic venous stasis changes and lymphedema, unable to palpate b/l distal pulses, no tenderness   NERVOUS SYSTEM: answers questions and follows commands appropriately    LABS:                        12.4   9.09  )-----------( 357      ( 18 Jan 2021 09:41 )             38.0     CBC Full  -  ( 18 Jan 2021 09:41 )  WBC Count : 9.09 K/uL  Hemoglobin : 12.4 g/dL  Hematocrit : 38.0 %  Platelet Count - Automated : 357 K/uL  Mean Cell Volume : 89.8 fl  Mean Cell Hemoglobin : 29.3 pg  Mean Cell Hemoglobin Concentration : 32.6 gm/dL  Auto Neutrophil # : x  Auto Lymphocyte # : x  Auto Monocyte # : x  Auto Eosinophil # : x  Auto Basophil # : x  Auto Neutrophil % : x  Auto Lymphocyte % : x  Auto Monocyte % : x  Auto Eosinophil % : x  Auto Basophil % : x    18 Jan 2021 09:41    139    |  100    |  14     ----------------------------<  174    3.4     |  32     |  1.10     Ca    8.2        18 Jan 2021 09:41          CAPILLARY BLOOD GLUCOSE      POCT Blood Glucose.: 200 mg/dL (18 Jan 2021 16:37)  POCT Blood Glucose.: 160 mg/dL (18 Jan 2021 11:53)  POCT Blood Glucose.: 181 mg/dL (18 Jan 2021 07:35)  POCT Blood Glucose.: 173 mg/dL (17 Jan 2021 21:14)        Culture - Tissue with Gram Stain (collected 01-15-21 @ 19:03)  Source: .Tissue Other, right foot fibular sesmoid  Gram Stain (01-15-21 @ 22:20):    No polymorphonuclear cells seen per low power field    No organisms seen per oil power field  Preliminary Report (01-16-21 @ 18:17):    Rare Streptococcus agalactiae (Group B) isolated    Group B streptococci are susceptible to ampicillin,    penicillin and cefazolin, but may be resistant to    erythromycin and clindamycin.    Recommendations for intrapartum prophylaxis for Group B    streptococci are penicillin or ampicillin.    Culture - Tissue with Gram Stain (collected 01-15-21 @ 19:03)  Source: .Tissue Other, rigfht foot tibial sesmoid  Gram Stain (01-15-21 @ 22:19):    No polymorphonuclear cells seen per low power field    No organisms seen per oil power field  Preliminary Report (01-18-21 @ 17:59):    Growth in fluid media only Streptococcus agalactiae (Group B) isolated    Group B streptococci are susceptible to ampicillin,    penicillin and cefazolin, but may be resistant to    erythromycin and clindamycin.    Recommendations for intrapartum prophylaxis for Group B    streptococci are penicillin or ampicillin.    Culture - Tissue with Gram Stain (collected 01-15-21 @ 19:03)  Source: .Tissue Other, right foot soft tissue culture  Gram Stain (01-15-21 @ 22:20):    No polymorphonuclear cells seen per low power field    No organisms seen per oil power field  Preliminary Report (01-16-21 @ 18:17):    Few Streptococcus agalactiae (Group B) isolated    Group B streptococci are susceptible to ampicillin,    penicillin and cefazolin, but may be resistant to    erythromycin and clindamycin.    Recommendations for intrapartum prophylaxis for Group B    streptococci are penicillin or ampicillin.    Culture - Blood (collected 01-13-21 @ 00:59)  Source: .Blood Blood-Peripheral  Final Report (01-18-21 @ 01:00):    No Growth Final    Culture - Blood (collected 01-13-21 @ 00:31)  Source: .Blood Blood-Peripheral  Final Report (01-18-21 @ 01:00):    No Growth Final    Culture - Other (collected 01-13-21 @ 00:28)  Source: .Other right foot  Final Report (01-14-21 @ 16:27):    Rare Staphylococcus aureus    Moderate Streptococcus agalactiae (Group B) isolated    Group B streptococci are susceptible to ampicillin,    penicillin and cefazolin, but may be resistant to    erythromycin and clindamycin.    Recommendations for intrapartumprophylaxis for Group B    streptococci are penicillin or ampicillin.  Organism: Staphylococcus aureus (01-14-21 @ 16:27)  Organism: Staphylococcus aureus (01-14-21 @ 16:27)      -  Ampicillin/Sulbactam: S <=8/4      -  Cefazolin: S <=4      -  Clindamycin: S <=0.25      -  Erythromycin: S <=0.25      -  Gentamicin: S <=1 Should not be used as monotherapy      -  Oxacillin: S <=0.25      -  RIF- Rifampin: S <=1 Should not be used as monotherapy      -  Tetra/Doxy: S <=1      -  Trimethoprim/Sulfamethoxazole: S <=0.5/9.5      -  Vancomycin: S 2      Method Type: CATRACHITA        RADIOLOGY & ADDITIONAL TESTS:    Personally reviewed.     Consultant(s) Notes Reviewed:  [x] YES  [ ] NO

## 2021-01-18 NOTE — PROGRESS NOTE ADULT - PROBLEM SELECTOR PLAN 3
- on lantus and premeal insulin per endo  - Endo, Dr. Perlman, following: recs appreciated  - A1c 6.6%

## 2021-01-18 NOTE — PROGRESS NOTE ADULT - SUBJECTIVE AND OBJECTIVE BOX
70y year old Female seen at [facility] 1EAS 112 W1 for cellulites right foot.  S/P right foot OR debridement and foreign body removal by Dr. Lovell 1/16/21.  Dressing clean, dry and intact w/ wound vac running at 125 mmHg.  Denied chills, nausea, vomiting, chest pain, shortness of breath, or calf pain at this time.    Allergies    penicillins (Hives)    Intolerances        MEDICATIONS  (STANDING):  atorvastatin 40 milliGRAM(s) Oral at bedtime  cefTRIAXone   IVPB      cefTRIAXone   IVPB 1000 milliGRAM(s) IV Intermittent every 24 hours  dextrose 40% Gel 15 Gram(s) Oral once  dextrose 5%. 1000 milliLiter(s) (50 mL/Hr) IV Continuous <Continuous>  dextrose 5%. 1000 milliLiter(s) (100 mL/Hr) IV Continuous <Continuous>  dextrose 50% Injectable 25 Gram(s) IV Push once  dextrose 50% Injectable 25 Gram(s) IV Push once  dextrose 50% Injectable 12.5 Gram(s) IV Push once  dextrose 50% Injectable 25 Gram(s) IV Push once  enalapril 20 milliGRAM(s) Oral daily  furosemide    Tablet 40 milliGRAM(s) Oral daily  glucagon  Injectable 1 milliGRAM(s) IntraMuscular once  glucagon  Injectable 1 milliGRAM(s) IntraMuscular once  heparin   Injectable 7500 Unit(s) SubCutaneous every 8 hours  insulin glargine Injectable (LANTUS) 35 Unit(s) SubCutaneous at bedtime  insulin lispro (ADMELOG) corrective regimen sliding scale   SubCutaneous three times a day before meals  insulin lispro (ADMELOG) corrective regimen sliding scale   SubCutaneous at bedtime  insulin lispro Injectable (ADMELOG) 6 Unit(s) SubCutaneous three times a day before meals  metoprolol succinate ER 50 milliGRAM(s) Oral daily    MEDICATIONS  (PRN):  EPINEPHrine     1 mG/mL Injectable 0.3 milliGRAM(s) IntraMuscular once PRN ANAPHYLAXIS ONLY      Vital Signs Last 24 Hrs  T(C): 36.8 (18 Jan 2021 05:04), Max: 36.9 (17 Jan 2021 14:37)  T(F): 98.3 (18 Jan 2021 05:04), Max: 98.4 (17 Jan 2021 14:37)  HR: 76 (18 Jan 2021 05:04) (69 - 82)  BP: 112/68 (18 Jan 2021 05:04) (112/68 - 131/69)  BP(mean): --  RR: 18 (18 Jan 2021 05:04) (18 - 19)  SpO2: 98% (18 Jan 2021 05:04) (95% - 98%)    PHYSICAL EXAM:  Vascular: DP & PT nonpalpable bilaterally due to edema, Capillary refill 3 seconds.  Nonpitting edema to leg b/l.  2+ pitting edema to dorsal foot b/l  Neurological: Light touch sensation intact bilaterally  Musculoskeletal: POP plantar right forefoot   Dermatological: Wound (1)  Right foot sub 1st met head puncture wound.  Approximately 0.1 cm annular.  4 cc white purulence expressed.  PTB(+)      CBC Full  -  ( 18 Jan 2021 09:41 )  WBC Count : 9.09 K/uL  RBC Count : 4.23 M/uL  Hemoglobin : 12.4 g/dL  Hematocrit : 38.0 %  Platelet Count - Automated : 357 K/uL  Mean Cell Volume : 89.8 fl  Mean Cell Hemoglobin : 29.3 pg  Mean Cell Hemoglobin Concentration : 32.6 gm/dL  Auto Neutrophil # : x  Auto Lymphocyte # : x  Auto Monocyte # : x  Auto Eosinophil # : x  Auto Basophil # : x  Auto Neutrophil % : x  Auto Lymphocyte % : x  Auto Monocyte % : x  Auto Eosinophil % : x  Auto Basophil % : x      ----------CHEM PANEL----------          Culture - Tissue with Gram Stain (collected 15 Manuel 2021 19:03)  Source: .Tissue Other, right foot fibular sesmoid  Gram Stain (15 Manuel 2021 22:20):    No polymorphonuclear cells seen per low power field    No organisms seen per oil power field  Preliminary Report (16 Jan 2021 18:17):    Rare Streptococcus agalactiae (Group B) isolated    Group B streptococci are susceptible to ampicillin,    penicillin and cefazolin, but may be resistant to    erythromycin and clindamycin.    Recommendations for intrapartum prophylaxis for Group B    streptococci are penicillin or ampicillin.    Culture - Tissue with Gram Stain (collected 15 Manuel 2021 19:03)  Source: .Tissue Other, rigfht foot tibial sesmoid  Gram Stain (15 Manuel 2021 22:19):    No polymorphonuclear cells seen per low power field    No organisms seen per oil power field  Preliminary Report (16 Jan 2021 18:09):    No growth to date.    Culture - Tissue with Gram Stain (collected 15 Manuel 2021 19:03)  Source: .Tissue Other, right foot soft tissue culture  Gram Stain (15 Manuel 2021 22:20):    No polymorphonuclear cells seen per low power field    No organisms seen per oil power field  Preliminary Report (16 Jan 2021 18:17):    Few Streptococcus agalactiae (Group B) isolated    Group B streptococci are susceptible to ampicillin,    penicillin and cefazolin, but may be resistant to    erythromycin and clindamycin.    Recommendations for intrapartum prophylaxis for Group B    streptococci are penicillin or ampicillin.        Imaging: ----------

## 2021-01-18 NOTE — PROGRESS NOTE ADULT - ASSESSMENT
Pt is a 70W w/ PMHx of HTN, DM2, LBBB (w/ neg stress, TTE 6/21 w/ mild AS), lymphedema presents to ED c/o RLE pain/swelling/redness x3 days. Admitted for RLE cellulitis, found to have foreign body in foot    RLE Cellulitis/R foot OM  Foreign body in foot  Fevers-resolved  Leukocytosis-resolved  -imaging reviewed.  suspicious for OM of 1st/2nd toe  -BCx NGTD  -WCx MSSA, Strep  -OR TCx GBS   S/p I&D and FB removal on 1/15  Appreciate podiatry recs re: results of NM scan if any intervention planned. Spoke to pt, not interested in other surgery.   Linezolid not an option for LT Abx given side effects of pancytopenia. Vancomycin also not currently an option as dosing for therapeutic levels will be difficult to manage as outpatient  2nd dose ceftriaxone today (will trial w/o premedication; benadryl prn ordered in case of rxn)    Patient will need placement of PICC line. If tolerating ceftriaxone, will do total 6 wk course of ceftriaxone 2gm IV until 2/25/2021  Pt will need weekly BMP, CBC, ESR, CRP to be faxed to [phone]    Penicillin Allergy  -pt reports severe rash and hives as well as SOB and feelings of throat closing up w/ use of penicillins  Trialing therapy as above. Tolerated 1st dose, will c/w monitoring    DM2  -strict glucose control in setting of acute infection    Lymphedema  -c/w home lasix  -c/w limb elevation

## 2021-01-18 NOTE — DISCHARGE NOTE NURSING/CASE MANAGEMENT/SOCIAL WORK - NSSCTYPOFSERV_GEN_ALL_CORE
[facility] - [phone]/ [phone]  Registered Nurse to visit the day after hospital discharge; Please contact the home care agency at the above phone number if you have not heard from them by 12 noon on the day after your hospital discharge.  Wound Vac Care

## 2021-01-18 NOTE — DISCHARGE NOTE NURSING/CASE MANAGEMENT/SOCIAL WORK - NSSCNAMETXT_GEN_ALL_CORE
[facility] (formerly [facility])   [address] [address]

## 2021-01-18 NOTE — PROGRESS NOTE ADULT - SUBJECTIVE AND OBJECTIVE BOX
[facility], Division of Infectious Diseases  JOE Slaughter Y. Patel, S. Shah  [phone]    Name: EVELYN LOPEZ  Age: 70y  Gender: Female  MRN: [id number]  Note Date: 01-18-21    Interval History:  Patient seen and examined at bedside this morning  No acute overnight events. Afebrile  Eating breakfast. No complaints  Tolerated initial round of ceftriaxone w/ benadryl yesterday, will trial w/o benadryl today.   Notes reviewed    Antibiotics:  cefTRIAXone   IVPB      cefTRIAXone   IVPB 1000 milliGRAM(s) IV Intermittent every 24 hours      Medications:  atorvastatin 40 milliGRAM(s) Oral at bedtime  cefTRIAXone   IVPB      cefTRIAXone   IVPB 1000 milliGRAM(s) IV Intermittent every 24 hours  dextrose 40% Gel 15 Gram(s) Oral once  dextrose 5%. 1000 milliLiter(s) IV Continuous <Continuous>  dextrose 5%. 1000 milliLiter(s) IV Continuous <Continuous>  dextrose 50% Injectable 25 Gram(s) IV Push once  dextrose 50% Injectable 25 Gram(s) IV Push once  dextrose 50% Injectable 12.5 Gram(s) IV Push once  dextrose 50% Injectable 25 Gram(s) IV Push once  enalapril 20 milliGRAM(s) Oral daily  EPINEPHrine     1 mG/mL Injectable 0.3 milliGRAM(s) IntraMuscular once PRN  furosemide    Tablet 40 milliGRAM(s) Oral daily  glucagon  Injectable 1 milliGRAM(s) IntraMuscular once  glucagon  Injectable 1 milliGRAM(s) IntraMuscular once  heparin   Injectable 7500 Unit(s) SubCutaneous every 8 hours  insulin glargine Injectable (LANTUS) 35 Unit(s) SubCutaneous at bedtime  insulin lispro (ADMELOG) corrective regimen sliding scale   SubCutaneous three times a day before meals  insulin lispro (ADMELOG) corrective regimen sliding scale   SubCutaneous at bedtime  insulin lispro Injectable (ADMELOG) 6 Unit(s) SubCutaneous three times a day before meals  metoprolol succinate ER 50 milliGRAM(s) Oral daily      Review of Systems:  A 10-point review of systems was obtained.     Pertinent positives and negatives--  Constitutional: No fevers. No Chills. No Rigors.   Cardiovascular: No chest pain. No palpitations.  Respiratory: No shortness of breath. No cough.  Gastrointestinal: No nausea or vomiting. No diarrhea or constipation.   Psychiatric: Pleasant. Appropriate affect.    Review of systems otherwise negative except as previously noted.    Allergies: penicillins (Hives)    For details regarding the patient's past medical history, social history, family history, and other miscellaneous elements, please refer the initial infectious diseases consultation and/or the admitting history and physical examination for this admission.    Objective:  Vitals:   T(C): 36.8 (01-18-21 @ 05:04), Max: 36.9 (01-17-21 @ 14:37)  HR: 76 (01-18-21 @ 05:04) (69 - 82)  BP: 112/68 (01-18-21 @ 05:04) (112/68 - 131/69)  RR: 18 (01-18-21 @ 05:04) (18 - 19)  SpO2: 98% (01-18-21 @ 05:04) (95% - 98%)    Physical Examination:  General: no acute distress  HEENT: NC/AT, EOMI, anicteric, no oral lesions  Neck: supple, no palpable LAD  Cardio: S1, S2 heard, RRR, no murmurs  Resp: breath sounds heard bilaterally, no rales, wheezes or rhonchi  Abd: soft, NT, ND, + bowel sounds  Neuro: AAOx3, no obvious focal deficits  Ext: b./l LE lymphedema, stasis dermatitis. improved erythema. R foot s/p I&D in dressing  Skin: warm, dry, no visible rash      Laboratory Studies:  CBC:                       11.8   9.38  )-----------( 345      ( 17 Jan 2021 08:18 )             35.9     CMP: 01-17    139  |  98  |  15  ----------------------------<  216<H>  3.4<L>   |  32<H>  |  1.00    Ca    8.3<L>      17 Jan 2021 08:18    TPro  6.7  /  Alb  2.4<L>  /  TBili  0.5  /  DBili  x   /  AST  23  /  ALT  20  /  AlkPhos  73  01-17    LIVER FUNCTIONS - ( 17 Jan 2021 08:18 )  Alb: 2.4 g/dL / Pro: 6.7 g/dL / ALK PHOS: 73 U/L / ALT: 20 U/L / AST: 23 U/L / GGT: x             Microbiology: reviewed    Culture - Tissue with Gram Stain (collected 01-15-21 @ 19:03)  Source: .Tissue Other, right foot fibular sesmoid  Gram Stain (01-15-21 @ 22:20):    No polymorphonuclear cells seen per low power field    No organisms seen per oil power field  Preliminary Report (01-16-21 @ 18:17):    Rare Streptococcus agalactiae (Group B) isolated    Group B streptococci are susceptible to ampicillin,    penicillin and cefazolin, but may be resistant to    erythromycin and clindamycin.    Recommendations for intrapartum prophylaxis for Group B    streptococci are penicillin or ampicillin.    Culture - Tissue with Gram Stain (collected 01-15-21 @ 19:03)  Source: .Tissue Other, rigfht foot tibial sesmoid  Gram Stain (01-15-21 @ 22:19):    No polymorphonuclear cells seen per low power field    No organisms seen per oil power field  Preliminary Report (01-16-21 @ 18:09):    No growth to date.    Culture - Tissue with Gram Stain (collected 01-15-21 @ 19:03)  Source: .Tissue Other, right foot soft tissue culture  Gram Stain (01-15-21 @ 22:20):    No polymorphonuclear cells seen per low power field    No organisms seen per oil power field  Preliminary Report (01-16-21 @ 18:17):    Few Streptococcus agalactiae (Group B) isolated    Group B streptococci are susceptible to ampicillin,    penicillin and cefazolin, but may be resistant to    erythromycin and clindamycin.    Recommendations for intrapartum prophylaxis for Group B    streptococci are penicillin or ampicillin.        Radiology: reviewed

## 2021-01-19 LAB
ANION GAP SERPL CALC-SCNC: 5 MMOL/L — SIGNIFICANT CHANGE UP (ref 5–17)
BASOPHILS # BLD AUTO: 0.04 K/UL — SIGNIFICANT CHANGE UP (ref 0–0.2)
BASOPHILS NFR BLD AUTO: 0.4 % — SIGNIFICANT CHANGE UP (ref 0–2)
BUN SERPL-MCNC: 15 MG/DL — SIGNIFICANT CHANGE UP (ref 7–23)
CALCIUM SERPL-MCNC: 8.2 MG/DL — LOW (ref 8.5–10.1)
CHLORIDE SERPL-SCNC: 99 MMOL/L — SIGNIFICANT CHANGE UP (ref 96–108)
CO2 SERPL-SCNC: 34 MMOL/L — HIGH (ref 22–31)
CREAT SERPL-MCNC: 1.1 MG/DL — SIGNIFICANT CHANGE UP (ref 0.5–1.3)
EOSINOPHIL # BLD AUTO: 0.48 K/UL — SIGNIFICANT CHANGE UP (ref 0–0.5)
EOSINOPHIL NFR BLD AUTO: 4.9 % — SIGNIFICANT CHANGE UP (ref 0–6)
ERYTHROCYTE [SEDIMENTATION RATE] IN BLOOD: 34 MM/HR — HIGH (ref 0–20)
GLUCOSE SERPL-MCNC: 221 MG/DL — HIGH (ref 70–99)
HCT VFR BLD CALC: 37.5 % — SIGNIFICANT CHANGE UP (ref 34.5–45)
HGB BLD-MCNC: 11.8 G/DL — SIGNIFICANT CHANGE UP (ref 11.5–15.5)
IMM GRANULOCYTES NFR BLD AUTO: 1.3 % — SIGNIFICANT CHANGE UP (ref 0–1.5)
LYMPHOCYTES # BLD AUTO: 1.32 K/UL — SIGNIFICANT CHANGE UP (ref 1–3.3)
LYMPHOCYTES # BLD AUTO: 13.6 % — SIGNIFICANT CHANGE UP (ref 13–44)
MCHC RBC-ENTMCNC: 28.8 PG — SIGNIFICANT CHANGE UP (ref 27–34)
MCHC RBC-ENTMCNC: 31.5 GM/DL — LOW (ref 32–36)
MCV RBC AUTO: 91.5 FL — SIGNIFICANT CHANGE UP (ref 80–100)
MONOCYTES # BLD AUTO: 0.96 K/UL — HIGH (ref 0–0.9)
MONOCYTES NFR BLD AUTO: 9.9 % — SIGNIFICANT CHANGE UP (ref 2–14)
NEUTROPHILS # BLD AUTO: 6.78 K/UL — SIGNIFICANT CHANGE UP (ref 1.8–7.4)
NEUTROPHILS NFR BLD AUTO: 69.9 % — SIGNIFICANT CHANGE UP (ref 43–77)
NRBC # BLD: 0 /100 WBCS — SIGNIFICANT CHANGE UP (ref 0–0)
PLATELET # BLD AUTO: 331 K/UL — SIGNIFICANT CHANGE UP (ref 150–400)
POTASSIUM SERPL-MCNC: 3.9 MMOL/L — SIGNIFICANT CHANGE UP (ref 3.5–5.3)
POTASSIUM SERPL-SCNC: 3.9 MMOL/L — SIGNIFICANT CHANGE UP (ref 3.5–5.3)
RBC # BLD: 4.1 M/UL — SIGNIFICANT CHANGE UP (ref 3.8–5.2)
RBC # FLD: 13.5 % — SIGNIFICANT CHANGE UP (ref 10.3–14.5)
SODIUM SERPL-SCNC: 138 MMOL/L — SIGNIFICANT CHANGE UP (ref 135–145)
WBC # BLD: 9.71 K/UL — SIGNIFICANT CHANGE UP (ref 3.8–10.5)
WBC # FLD AUTO: 9.71 K/UL — SIGNIFICANT CHANGE UP (ref 3.8–10.5)

## 2021-01-19 PROCEDURE — 99233 SBSQ HOSP IP/OBS HIGH 50: CPT | Mod: GC

## 2021-01-19 PROCEDURE — 99024 POSTOP FOLLOW-UP VISIT: CPT

## 2021-01-19 RX ORDER — CEFTRIAXONE 500 MG/1
2000 INJECTION, POWDER, FOR SOLUTION INTRAMUSCULAR; INTRAVENOUS EVERY 24 HOURS
Refills: 0 | Status: DISCONTINUED | OUTPATIENT
Start: 2021-01-20 | End: 2021-01-20

## 2021-01-19 RX ORDER — CEFTRIAXONE 500 MG/1
2 INJECTION, POWDER, FOR SOLUTION INTRAMUSCULAR; INTRAVENOUS
Qty: 76 | Refills: 0
Start: 2021-01-19 | End: 2021-02-25

## 2021-01-19 RX ORDER — CEFTRIAXONE 500 MG/1
INJECTION, POWDER, FOR SOLUTION INTRAMUSCULAR; INTRAVENOUS
Refills: 0 | Status: DISCONTINUED | OUTPATIENT
Start: 2021-01-19 | End: 2021-01-20

## 2021-01-19 RX ORDER — INSULIN LISPRO 100/ML
8 VIAL (ML) SUBCUTANEOUS
Refills: 0 | Status: DISCONTINUED | OUTPATIENT
Start: 2021-01-19 | End: 2021-01-20

## 2021-01-19 RX ORDER — CEFTRIAXONE 500 MG/1
2000 INJECTION, POWDER, FOR SOLUTION INTRAMUSCULAR; INTRAVENOUS ONCE
Refills: 0 | Status: COMPLETED | OUTPATIENT
Start: 2021-01-19 | End: 2021-01-19

## 2021-01-19 RX ADMIN — CEFTRIAXONE 100 MILLIGRAM(S): 500 INJECTION, POWDER, FOR SOLUTION INTRAMUSCULAR; INTRAVENOUS at 10:00

## 2021-01-19 RX ADMIN — Medication 20 MILLIGRAM(S): at 09:39

## 2021-01-19 RX ADMIN — HEPARIN SODIUM 7500 UNIT(S): 5000 INJECTION INTRAVENOUS; SUBCUTANEOUS at 13:42

## 2021-01-19 RX ADMIN — INSULIN GLARGINE 35 UNIT(S): 100 INJECTION, SOLUTION SUBCUTANEOUS at 21:57

## 2021-01-19 RX ADMIN — Medication 8 UNIT(S): at 17:12

## 2021-01-19 RX ADMIN — HEPARIN SODIUM 7500 UNIT(S): 5000 INJECTION INTRAVENOUS; SUBCUTANEOUS at 21:58

## 2021-01-19 RX ADMIN — Medication 2: at 17:10

## 2021-01-19 RX ADMIN — Medication 4: at 08:27

## 2021-01-19 RX ADMIN — Medication 40 MILLIGRAM(S): at 06:09

## 2021-01-19 RX ADMIN — HEPARIN SODIUM 7500 UNIT(S): 5000 INJECTION INTRAVENOUS; SUBCUTANEOUS at 06:09

## 2021-01-19 RX ADMIN — Medication 50 MILLIGRAM(S): at 09:39

## 2021-01-19 RX ADMIN — Medication 4: at 12:23

## 2021-01-19 RX ADMIN — ATORVASTATIN CALCIUM 40 MILLIGRAM(S): 80 TABLET, FILM COATED ORAL at 21:58

## 2021-01-19 RX ADMIN — Medication 8 UNIT(S): at 12:23

## 2021-01-19 NOTE — PROGRESS NOTE ADULT - SUBJECTIVE AND OBJECTIVE BOX
Patient is a 70y old  Female who presents with a chief complaint of RLE cellulitis, foreign body in foot (19 Jan 2021 07:45)      INTERVAL HPI/OVERNIGHT EVENTS: Patient seen and examined at bedside. No overnight events occurred. Patient has no complaints at this time. Denies fevers, chills, headache, lightheadedness, chest pain, dyspnea, abdominal pain, n/v/d/c. Patient reports appropriate sleep and appetite. Patient seen eating breakfast this AM, and patient had BM this morning. Patient to have PICC placed today, and RLE bandaging changed.     MEDICATIONS  (STANDING):  atorvastatin 40 milliGRAM(s) Oral at bedtime  cefTRIAXone   IVPB      cefTRIAXone   IVPB 1000 milliGRAM(s) IV Intermittent every 24 hours  dextrose 40% Gel 15 Gram(s) Oral once  dextrose 5%. 1000 milliLiter(s) (50 mL/Hr) IV Continuous <Continuous>  dextrose 5%. 1000 milliLiter(s) (100 mL/Hr) IV Continuous <Continuous>  dextrose 50% Injectable 25 Gram(s) IV Push once  dextrose 50% Injectable 25 Gram(s) IV Push once  dextrose 50% Injectable 12.5 Gram(s) IV Push once  dextrose 50% Injectable 25 Gram(s) IV Push once  enalapril 20 milliGRAM(s) Oral daily  furosemide    Tablet 40 milliGRAM(s) Oral daily  glucagon  Injectable 1 milliGRAM(s) IntraMuscular once  glucagon  Injectable 1 milliGRAM(s) IntraMuscular once  heparin   Injectable 7500 Unit(s) SubCutaneous every 8 hours  insulin glargine Injectable (LANTUS) 35 Unit(s) SubCutaneous at bedtime  insulin lispro (ADMELOG) corrective regimen sliding scale   SubCutaneous three times a day before meals  insulin lispro (ADMELOG) corrective regimen sliding scale   SubCutaneous at bedtime  insulin lispro Injectable (ADMELOG) 8 Unit(s) SubCutaneous three times a day before meals  metoprolol succinate ER 50 milliGRAM(s) Oral daily    MEDICATIONS  (PRN):  EPINEPHrine     1 mG/mL Injectable 0.3 milliGRAM(s) IntraMuscular once PRN ANAPHYLAXIS ONLY      Allergies    penicillins (Hives)    Intolerances        REVIEW OF SYSTEMS:  CONSTITUTIONAL: No fever or chills  HEENT:  No headache, no sore throat  RESPIRATORY: No cough, wheezing, or shortness of breath  CARDIOVASCULAR: No chest pain, palpitations  GASTROINTESTINAL: No abd pain, nausea, vomiting, or diarrhea  GENITOURINARY: No dysuria, frequency, or hematuria  NEUROLOGICAL: no focal weakness or dizziness  MUSCULOSKELETAL: no myalgias, +chronic b/L LE edema    Vital Signs Last 24 Hrs  T(C): 37.1 (19 Jan 2021 04:40), Max: 37.1 (19 Jan 2021 04:40)  T(F): 98.8 (19 Jan 2021 04:40), Max: 98.8 (19 Jan 2021 04:40)  HR: 76 (19 Jan 2021 08:35) (72 - 78)  BP: 112/61 (19 Jan 2021 08:35) (101/53 - 118/71)  BP(mean): --  RR: 18 (19 Jan 2021 04:40) (18 - 18)  SpO2: 92% (19 Jan 2021 04:40) (92% - 92%)    PHYSICAL EXAM:  GENERAL: NAD  HEENT:  anicteric, moist mucous membranes  CHEST/LUNG:  CTA b/l, no rales, wheezes, or rhonchi  HEART:  RRR, S1, S2  ABDOMEN:  BS+, soft, nontender, nondistended  EXTREMITIES: +RLE with clean and dry dressing, b/l LE chronic venous stasis changes and lymphedema, no tenderness   NERVOUS SYSTEM: answers questions and follows commands appropriately    LABS:                        12.4   9.09  )-----------( 357      ( 18 Jan 2021 09:41 )             38.0     CBC Full  -  ( 18 Jan 2021 09:41 )  WBC Count : 9.09 K/uL  Hemoglobin : 12.4 g/dL  Hematocrit : 38.0 %  Platelet Count - Automated : 357 K/uL  Mean Cell Volume : 89.8 fl  Mean Cell Hemoglobin : 29.3 pg  Mean Cell Hemoglobin Concentration : 32.6 gm/dL  Auto Neutrophil # : x  Auto Lymphocyte # : x  Auto Monocyte # : x  Auto Eosinophil # : x  Auto Basophil # : x  Auto Neutrophil % : x  Auto Lymphocyte % : x  Auto Monocyte % : x  Auto Eosinophil % : x  Auto Basophil % : x    18 Jan 2021 09:41    139    |  100    |  14     ----------------------------<  174    3.4     |  32     |  1.10     Ca    8.2        18 Jan 2021 09:41          CAPILLARY BLOOD GLUCOSE      POCT Blood Glucose.: 228 mg/dL (19 Jan 2021 07:44)  POCT Blood Glucose.: 219 mg/dL (18 Jan 2021 21:24)  POCT Blood Glucose.: 200 mg/dL (18 Jan 2021 16:37)  POCT Blood Glucose.: 160 mg/dL (18 Jan 2021 11:53)        Culture - Tissue with Gram Stain (collected 01-15-21 @ 19:03)  Source: .Tissue Other, right foot fibular sesmoid  Gram Stain (01-15-21 @ 22:20):    No polymorphonuclear cells seen per low power field    No organisms seen per oil power field  Preliminary Report (01-16-21 @ 18:17):    Rare Streptococcus agalactiae (Group B) isolated    Group B streptococci are susceptible to ampicillin,    penicillin and cefazolin, but may be resistant to    erythromycin and clindamycin.    Recommendations for intrapartum prophylaxis for Group B    streptococci are penicillin or ampicillin.    Culture - Tissue with Gram Stain (collected 01-15-21 @ 19:03)  Source: .Tissue Other, rigfht foot tibial sesmoid  Gram Stain (01-15-21 @ 22:19):    No polymorphonuclear cells seen per low power field    No organisms seen per oil power field  Preliminary Report (01-18-21 @ 17:59):    Growth in fluid media only Streptococcus agalactiae (Group B) isolated    Group B streptococci are susceptible to ampicillin,    penicillin and cefazolin, but may be resistant to    erythromycin and clindamycin.    Recommendations for intrapartum prophylaxis for Group B    streptococci are penicillin or ampicillin.    Culture - Tissue with Gram Stain (collected 01-15-21 @ 19:03)  Source: .Tissue Other, right foot soft tissue culture  Gram Stain (01-15-21 @ 22:20):    No polymorphonuclear cells seen per low power field    No organisms seen per oil power field  Preliminary Report (01-16-21 @ 18:17):    Few Streptococcus agalactiae (Group B) isolated    Group B streptococci are susceptible to ampicillin,    penicillin and cefazolin, but may be resistant to    erythromycin and clindamycin.    Recommendations for intrapartum prophylaxis for Group B    streptococci are penicillin or ampicillin.    Culture - Blood (collected 01-13-21 @ 00:59)  Source: .Blood Blood-Peripheral  Final Report (01-18-21 @ 01:00):    No Growth Final    Culture - Blood (collected 01-13-21 @ 00:31)  Source: .Blood Blood-Peripheral  Final Report (01-18-21 @ 01:00):    No Growth Final    Culture - Other (collected 01-13-21 @ 00:28)  Source: .Other right foot  Final Report (01-14-21 @ 16:27):    Rare Staphylococcus aureus    Moderate Streptococcus agalactiae (Group B) isolated    Group B streptococci are susceptible to ampicillin,    penicillin and cefazolin, but may be resistant to    erythromycin and clindamycin.    Recommendations for intrapartumprophylaxis for Group B    streptococci are penicillin or ampicillin.  Organism: Staphylococcus aureus (01-14-21 @ 16:27)  Organism: Staphylococcus aureus (01-14-21 @ 16:27)      -  Ampicillin/Sulbactam: S <=8/4      -  Cefazolin: S <=4      -  Clindamycin: S <=0.25      -  Erythromycin: S <=0.25      -  Gentamicin: S <=1 Should not be used as monotherapy      -  Oxacillin: S <=0.25      -  RIF- Rifampin: S <=1 Should not be used as monotherapy      -  Tetra/Doxy: S <=1      -  Trimethoprim/Sulfamethoxazole: S <=0.5/9.5      -  Vancomycin: S 2      Method Type: CATRACHITA        RADIOLOGY & ADDITIONAL TESTS:    Personally reviewed.     Consultant(s) Notes Reviewed:  [x] YES  [ ] NO     Patient is a 70y old  Female who presents with a chief complaint of RLE cellulitis, foreign body in foot (19 Jan 2021 07:45)      INTERVAL HPI/OVERNIGHT EVENTS: Patient seen and examined at bedside. No overnight events occurred. Patient has no complaints at this time. Denies fevers, chills, headache, lightheadedness, chest pain, dyspnea, abdominal pain, n/v/d/c. Patient reports appropriate sleep and appetite. Patient seen eating breakfast this AM, and patient had BM this morning. Patient to have PICC placed tomorrow, and RLE bandaging to be changed possibly today.     MEDICATIONS  (STANDING):  atorvastatin 40 milliGRAM(s) Oral at bedtime  cefTRIAXone   IVPB      cefTRIAXone   IVPB 1000 milliGRAM(s) IV Intermittent every 24 hours  dextrose 40% Gel 15 Gram(s) Oral once  dextrose 5%. 1000 milliLiter(s) (50 mL/Hr) IV Continuous <Continuous>  dextrose 5%. 1000 milliLiter(s) (100 mL/Hr) IV Continuous <Continuous>  dextrose 50% Injectable 25 Gram(s) IV Push once  dextrose 50% Injectable 25 Gram(s) IV Push once  dextrose 50% Injectable 12.5 Gram(s) IV Push once  dextrose 50% Injectable 25 Gram(s) IV Push once  enalapril 20 milliGRAM(s) Oral daily  furosemide    Tablet 40 milliGRAM(s) Oral daily  glucagon  Injectable 1 milliGRAM(s) IntraMuscular once  glucagon  Injectable 1 milliGRAM(s) IntraMuscular once  heparin   Injectable 7500 Unit(s) SubCutaneous every 8 hours  insulin glargine Injectable (LANTUS) 35 Unit(s) SubCutaneous at bedtime  insulin lispro (ADMELOG) corrective regimen sliding scale   SubCutaneous three times a day before meals  insulin lispro (ADMELOG) corrective regimen sliding scale   SubCutaneous at bedtime  insulin lispro Injectable (ADMELOG) 8 Unit(s) SubCutaneous three times a day before meals  metoprolol succinate ER 50 milliGRAM(s) Oral daily    MEDICATIONS  (PRN):  EPINEPHrine     1 mG/mL Injectable 0.3 milliGRAM(s) IntraMuscular once PRN ANAPHYLAXIS ONLY      Allergies    penicillins (Hives)    Intolerances        REVIEW OF SYSTEMS:  CONSTITUTIONAL: No fever or chills  HEENT:  No headache, no sore throat  RESPIRATORY: No cough, wheezing, or shortness of breath  CARDIOVASCULAR: No chest pain, palpitations  GASTROINTESTINAL: No abd pain, nausea, vomiting, or diarrhea  GENITOURINARY: No dysuria, frequency, or hematuria  NEUROLOGICAL: no focal weakness or dizziness  MUSCULOSKELETAL: no myalgias, +chronic b/L LE edema    Vital Signs Last 24 Hrs  T(C): 37.1 (19 Jan 2021 04:40), Max: 37.1 (19 Jan 2021 04:40)  T(F): 98.8 (19 Jan 2021 04:40), Max: 98.8 (19 Jan 2021 04:40)  HR: 76 (19 Jan 2021 08:35) (72 - 78)  BP: 112/61 (19 Jan 2021 08:35) (101/53 - 118/71)  BP(mean): --  RR: 18 (19 Jan 2021 04:40) (18 - 18)  SpO2: 92% (19 Jan 2021 04:40) (92% - 92%)    PHYSICAL EXAM:  GENERAL: NAD  HEENT:  anicteric, moist mucous membranes  CHEST/LUNG:  CTA b/l, no rales, wheezes, or rhonchi  HEART:  RRR, S1, S2  ABDOMEN:  BS+, soft, nontender, nondistended  EXTREMITIES: +RLE with clean and dry dressing, b/l LE chronic venous stasis changes and lymphedema, no tenderness   NERVOUS SYSTEM: answers questions and follows commands appropriately    LABS:                        12.4   9.09  )-----------( 357      ( 18 Jan 2021 09:41 )             38.0     CBC Full  -  ( 18 Jan 2021 09:41 )  WBC Count : 9.09 K/uL  Hemoglobin : 12.4 g/dL  Hematocrit : 38.0 %  Platelet Count - Automated : 357 K/uL  Mean Cell Volume : 89.8 fl  Mean Cell Hemoglobin : 29.3 pg  Mean Cell Hemoglobin Concentration : 32.6 gm/dL  Auto Neutrophil # : x  Auto Lymphocyte # : x  Auto Monocyte # : x  Auto Eosinophil # : x  Auto Basophil # : x  Auto Neutrophil % : x  Auto Lymphocyte % : x  Auto Monocyte % : x  Auto Eosinophil % : x  Auto Basophil % : x    18 Jan 2021 09:41    139    |  100    |  14     ----------------------------<  174    3.4     |  32     |  1.10     Ca    8.2        18 Jan 2021 09:41          CAPILLARY BLOOD GLUCOSE      POCT Blood Glucose.: 228 mg/dL (19 Jan 2021 07:44)  POCT Blood Glucose.: 219 mg/dL (18 Jan 2021 21:24)  POCT Blood Glucose.: 200 mg/dL (18 Jan 2021 16:37)  POCT Blood Glucose.: 160 mg/dL (18 Jan 2021 11:53)        Culture - Tissue with Gram Stain (collected 01-15-21 @ 19:03)  Source: .Tissue Other, right foot fibular sesmoid  Gram Stain (01-15-21 @ 22:20):    No polymorphonuclear cells seen per low power field    No organisms seen per oil power field  Preliminary Report (01-16-21 @ 18:17):    Rare Streptococcus agalactiae (Group B) isolated    Group B streptococci are susceptible to ampicillin,    penicillin and cefazolin, but may be resistant to    erythromycin and clindamycin.    Recommendations for intrapartum prophylaxis for Group B    streptococci are penicillin or ampicillin.    Culture - Tissue with Gram Stain (collected 01-15-21 @ 19:03)  Source: .Tissue Other, rigfht foot tibial sesmoid  Gram Stain (01-15-21 @ 22:19):    No polymorphonuclear cells seen per low power field    No organisms seen per oil power field  Preliminary Report (01-18-21 @ 17:59):    Growth in fluid media only Streptococcus agalactiae (Group B) isolated    Group B streptococci are susceptible to ampicillin,    penicillin and cefazolin, but may be resistant to    erythromycin and clindamycin.    Recommendations for intrapartum prophylaxis for Group B    streptococci are penicillin or ampicillin.    Culture - Tissue with Gram Stain (collected 01-15-21 @ 19:03)  Source: .Tissue Other, right foot soft tissue culture  Gram Stain (01-15-21 @ 22:20):    No polymorphonuclear cells seen per low power field    No organisms seen per oil power field  Preliminary Report (01-16-21 @ 18:17):    Few Streptococcus agalactiae (Group B) isolated    Group B streptococci are susceptible to ampicillin,    penicillin and cefazolin, but may be resistant to    erythromycin and clindamycin.    Recommendations for intrapartum prophylaxis for Group B    streptococci are penicillin or ampicillin.    Culture - Blood (collected 01-13-21 @ 00:59)  Source: .Blood Blood-Peripheral  Final Report (01-18-21 @ 01:00):    No Growth Final    Culture - Blood (collected 01-13-21 @ 00:31)  Source: .Blood Blood-Peripheral  Final Report (01-18-21 @ 01:00):    No Growth Final    Culture - Other (collected 01-13-21 @ 00:28)  Source: .Other right foot  Final Report (01-14-21 @ 16:27):    Rare Staphylococcus aureus    Moderate Streptococcus agalactiae (Group B) isolated    Group B streptococci are susceptible to ampicillin,    penicillin and cefazolin, but may be resistant to    erythromycin and clindamycin.    Recommendations for intrapartumprophylaxis for Group B    streptococci are penicillin or ampicillin.  Organism: Staphylococcus aureus (01-14-21 @ 16:27)  Organism: Staphylococcus aureus (01-14-21 @ 16:27)      -  Ampicillin/Sulbactam: S <=8/4      -  Cefazolin: S <=4      -  Clindamycin: S <=0.25      -  Erythromycin: S <=0.25      -  Gentamicin: S <=1 Should not be used as monotherapy      -  Oxacillin: S <=0.25      -  RIF- Rifampin: S <=1 Should not be used as monotherapy      -  Tetra/Doxy: S <=1      -  Trimethoprim/Sulfamethoxazole: S <=0.5/9.5      -  Vancomycin: S 2      Method Type: CATRACHITA        RADIOLOGY & ADDITIONAL TESTS:    Personally reviewed.     Consultant(s) Notes Reviewed:  [x] YES  [ ] NO

## 2021-01-19 NOTE — PROGRESS NOTE ADULT - NSHPATTENDINGPLANDISCUSS_GEN_ALL_CORE
patient, podiatry, RN, infectious disease
patient, resident, podiatry
patient
patient, resident, podiatry
patient, podiatry, IR nurse
patient, resident
patient, resident, podiatry

## 2021-01-19 NOTE — PROGRESS NOTE ADULT - PROBLEM SELECTOR PLAN 1
-Patient with RLE swelling, pain, erythema x2-3 days  -S/p vanco and meropenem, s/p linezolid   - Pt on ceftriaxone day 2/7  - Wound culture with staph aureus, blood culturex2 NGTD  - Podiatry following, Dr. Lovell  - S/p I&D on 1/15  - Bone scan with findings suspicious for osteomyelitis of the first and second digits of the right foot distally., official further podiatry intervention pending bone scan results    - ID following, Dr. Chisholm. Pending pathology from bone collected. Per ID , pt should had picc line placed for 6 wks antibiotics. NM scan + for osteomyelitis. Per ID, even if bone path negative, this cannot r/o osteom completely as the remaining toe could still have infection.  - PICC to be placed today -Patient with RLE swelling, pain, erythema x2-3 days  -S/p vanco and meropenem, s/p linezolid   - Pt on ceftriaxone day 2/7  - Wound culture with staph aureus, blood culturex2 NGTD  - Podiatry following, Dr. Lovell  - S/p I&D on 1/15  - Bone scan with findings suspicious for osteomyelitis of the first and second digits of the right foot distally., official further podiatry intervention pending bone scan results    - ID following, Dr. Chisholm. Pending pathology from bone collected. Per ID , pt should had picc line placed for 6 wks antibiotics. NM scan + for osteomyelitis. Per ID, even if bone path negative, this cannot r/o osteom completely as the remaining toe could still have infection.  - PICC to be placed tomorrow by IR. -Patient with RLE swelling, pain, erythema x2-3 days  -S/p vanco and meropenem, s/p linezolid   - Pt on ceftriaxone iv to be  continued on discharge per ID  - Wound culture with staph aureus, blood culturex2 NGTD  - Podiatry following, Dr. Lovell  - S/p I&D on 1/15  - Bone scan with findings suspicious for osteomyelitis of the first and second digits of the right foot distally., official further podiatry intervention pending bone scan results    - ID following, Dr. Chisholm. Pending pathology from bone collected. Per ID , pt should had picc line placed for 6 wks antibiotics. NM scan + for osteomyelitis. Per ID, even if bone path negative, this cannot r/o osteom completely as the remaining toe could still have infection.  - PICC to be placed tomorrow by IR.

## 2021-01-19 NOTE — DIETITIAN INITIAL EVALUATION ADULT. - PROBLEM SELECTOR PLAN 3
-On lantus 35 units qhs and novolog sliding scale  -Will give lantus 17 unts qhs as keeping patient NPO after MN  -ISS  -Hypoglycemia protocol

## 2021-01-19 NOTE — PROGRESS NOTE ADULT - SUBJECTIVE AND OBJECTIVE BOX
CAPILLARY BLOOD GLUCOSE      POCT Blood Glucose.: 219 mg/dL (18 Jan 2021 21:24)  POCT Blood Glucose.: 200 mg/dL (18 Jan 2021 16:37)  POCT Blood Glucose.: 160 mg/dL (18 Jan 2021 11:53)      Vital Signs Last 24 Hrs  T(C): 37.1 (19 Jan 2021 04:40), Max: 37.1 (19 Jan 2021 04:40)  T(F): 98.8 (19 Jan 2021 04:40), Max: 98.8 (19 Jan 2021 04:40)  HR: 72 (19 Jan 2021 04:40) (72 - 78)  BP: 114/56 (19 Jan 2021 04:40) (101/53 - 118/71)  BP(mean): --  RR: 18 (19 Jan 2021 04:40) (18 - 18)  SpO2: 92% (19 Jan 2021 04:40) (92% - 92%)      Respiratory: CTA B/L  CV: RRR, S1S2, no murmurs, rubs or gallops  Abdominal: Soft, NT, ND +BS, Last BM  Extremities: No edema, + peripheral pulses     01-18    139  |  100  |  14  ----------------------------<  174<H>  3.4<L>   |  32<H>  |  1.10    Ca    8.2<L>      18 Jan 2021 09:41    TPro  6.7  /  Alb  2.4<L>  /  TBili  0.5  /  DBili  x   /  AST  23  /  ALT  20  /  AlkPhos  73  01-17      atorvastatin 40 milliGRAM(s) Oral at bedtime  dextrose 40% Gel 15 Gram(s) Oral once  dextrose 50% Injectable 25 Gram(s) IV Push once  dextrose 50% Injectable 25 Gram(s) IV Push once  dextrose 50% Injectable 25 Gram(s) IV Push once  dextrose 50% Injectable 12.5 Gram(s) IV Push once  glucagon  Injectable 1 milliGRAM(s) IntraMuscular once  glucagon  Injectable 1 milliGRAM(s) IntraMuscular once  insulin glargine Injectable (LANTUS) 35 Unit(s) SubCutaneous at bedtime  insulin lispro (ADMELOG) corrective regimen sliding scale   SubCutaneous three times a day before meals  insulin lispro (ADMELOG) corrective regimen sliding scale   SubCutaneous at bedtime  insulin lispro Injectable (ADMELOG) 6 Unit(s) SubCutaneous three times a day before meals

## 2021-01-19 NOTE — PROGRESS NOTE ADULT - PROBLEM SELECTOR PLAN 6
- heparin 7500units subq TID - heparin 7500units subq TID. Will hold morning dose tomorrow prior to IR procedure.

## 2021-01-19 NOTE — PROGRESS NOTE ADULT - PROBLEM SELECTOR PLAN 2
- s/p Right foot I&D w/ foreign body removal on 1/15  - Podiatry: No plan for procedure beyond I&D  - Bone scan with findings suspicious for osteomyelitis of the first and second digits of the right foot distally., official further podiatry intervention pending bone scan results    - Xray foot: Significant soft tissue swelling, predominantly involving the lower leg. No radiographic evidence of osteomyelitis. Suspect linear foreign bodies in the plantar soft tissues at the level of metatarsals as described above.  - Arterial Doppler- Flow could only seen in the anterior tibial artery however nonvisualization of the posterior tibial and peroneal arteries may be technical. Elevated velocities in the dorsalis pedis artery likely reflect underlying hemodynamically significant stenosis  -RENEE's within normal limits bilaterally  -Vascular, Dr. Hanson consulted, recs appreciated  - wound culture with staph aureus, start ceftriaxone today  - Pt with wound vac, pt to receive home wound vac on 19th

## 2021-01-19 NOTE — PROGRESS NOTE ADULT - PROBLEM SELECTOR PLAN 3
- A1c 6.6%  - cont lantus 35 units qhs  - increase admelog 8 units 3x/day before meals  - cont mod dose admelog scale coverage qac/qhs  - Endo, Dr. Perlman, following

## 2021-01-19 NOTE — DIETITIAN INITIAL EVALUATION ADULT. - PERSON TAUGHT/METHOD
Heart Healthy, DM, weight loss nutrition therapy. RDs name/phone number left with patient if questions/concerns arise./verbal instruction/written material/patient instructed

## 2021-01-19 NOTE — PROGRESS NOTE ADULT - ASSESSMENT
Pt is a 70W w/ PMHx of HTN, DM2, LBBB (w/ neg stress, TTE 6/21 w/ mild AS), lymphedema presents to ED c/o RLE pain/swelling/redness x3 days. Admitted for RLE cellulitis, found to have foreign body in foot    RLE Cellulitis/R foot OM  Foreign body in foot  Fevers-resolved  Leukocytosis-resolved  -imaging reviewed.  suspicious for OM of 1st/2nd toe  -BCx NGTD  -WCx MSSA, Strep  -OR TCx GBS   S/p I&D and FB removal on 1/15  Appreciate podiatry recs re: results of NM scan if any intervention planned. Spoke to pt, not interested in other surgery.   Pt tolerating ceftriaxone w/o pre-medication.   C/w ceftriaxone 2gm IV q24h for total 6 wk course of ceftriaxone 2gm IV until 2/25/2021  Patient will need placement of PICC line. Script placed in chart.   Pt will need weekly BMP, CBC, ESR, CRP to be faxed to [phone]  Pt has appt w/ me on 2/8/2021 via televisit; pt will be contacted w/ appt details    Penicillin Allergy  Pt reports severe rash and hives as well as SOB and feelings of throat closing up w/ use of penicillins  Pt tolerating cephalosporins w/o allergic rxn    DM2  -strict glucose control in setting of acute infection    Lymphedema  -c/w home lasix  -c/w limb elevation   Partial Purse String (Intermediate) Text: Given the location of the defect and the characteristics of the surrounding skin an intermediate purse string closure was deemed most appropriate.  Undermining was performed circumfirentially around the surgical defect.  A purse string suture was then placed and tightened. Wound tension only allowed a partial closure of the circular defect.

## 2021-01-19 NOTE — DIETITIAN INITIAL EVALUATION ADULT. - OTHER INFO
Pt A+Ox4, seen secondary to LOS > 7 days. Dx Right LE cellulitis. Dash/TLC, Consistent Carb diet rx. Well tolerated with good po intake; % per EMR. GI wdl. Formed BM 1/18. Bernardo legs 3+edema. Lasix rx. Low K 3.4- KCl rx. UBW 380lbs. Pt believes it is relatively stable. Hx Uncontrolled DM, on 35units lantus q hs pta plus sliding scale novolog covg. HgbA1c 6.6%- wdl. Pt trys to watch salt and sugar in diet pta. States she is not a huge snacker but does have trouble with portion control. Tends to eat very large servings. Has struggled with weight for quite a while. Written and verbal Heart healthy and DM nutrition therapy provided with good understanding.

## 2021-01-19 NOTE — DIETITIAN INITIAL EVALUATION ADULT. - PROBLEM SELECTOR PLAN 1
-Patient with RLE swelling, pain, erythema x2-3 days  -Admit to [facility]  -WBC elevated, currently afebrile  -S/p vanco and meropenem, will continue at this time. Spoke to pharmacy, vanco 1g given in ED is not sufficient as patient is 172kg. Will give another 1g stat and then vanco 2g BID  -F/u vanco trough  -Podiatry consulted, Dr. Lovell, f/u recs  -ID consulted, Dr. Chisholm, f/u recs

## 2021-01-19 NOTE — PROGRESS NOTE ADULT - ASSESSMENT
Right foot cellulitis   Right foot foreign body  S/P right foot OR debridement and foreign body removal by Dr. Lovell 1/16/21

## 2021-01-19 NOTE — PROGRESS NOTE ADULT - PROBLEM SELECTOR PLAN 1
cont lantus 35 units qhs  increase admelog 8 units 3x/day before meals  cont mod dose admelog scale coverage qac/qhs  cont cons cho diet  goal bg 100-180 in hosp setting

## 2021-01-19 NOTE — PROGRESS NOTE ADULT - SUBJECTIVE AND OBJECTIVE BOX
[facility], Division of Infectious Diseases  JOE Slaughter Y. Patel, S. Shah  [phone]    Name: EVELYN LOPEZ  Age: 70y  Gender: Female  MRN: [id number]  Note Date: 01-19-21    Interval History:  Patient seen and examined at bedside this morning  No acute overnight events. Afebrile  No complaints  Tolerated 2nd dose of ceftriaxone w/o benadryl. Pt felt fine.   Plan for PICC  Notes reviewed    Antibiotics:  cefTRIAXone   IVPB      cefTRIAXone   IVPB 1000 milliGRAM(s) IV Intermittent every 24 hours      Medications:  atorvastatin 40 milliGRAM(s) Oral at bedtime  cefTRIAXone   IVPB      cefTRIAXone   IVPB 1000 milliGRAM(s) IV Intermittent every 24 hours  dextrose 40% Gel 15 Gram(s) Oral once  dextrose 5%. 1000 milliLiter(s) IV Continuous <Continuous>  dextrose 5%. 1000 milliLiter(s) IV Continuous <Continuous>  dextrose 50% Injectable 25 Gram(s) IV Push once  dextrose 50% Injectable 25 Gram(s) IV Push once  dextrose 50% Injectable 12.5 Gram(s) IV Push once  dextrose 50% Injectable 25 Gram(s) IV Push once  enalapril 20 milliGRAM(s) Oral daily  EPINEPHrine     1 mG/mL Injectable 0.3 milliGRAM(s) IntraMuscular once PRN  furosemide    Tablet 40 milliGRAM(s) Oral daily  glucagon  Injectable 1 milliGRAM(s) IntraMuscular once  glucagon  Injectable 1 milliGRAM(s) IntraMuscular once  heparin   Injectable 7500 Unit(s) SubCutaneous every 8 hours  insulin glargine Injectable (LANTUS) 35 Unit(s) SubCutaneous at bedtime  insulin lispro (ADMELOG) corrective regimen sliding scale   SubCutaneous three times a day before meals  insulin lispro (ADMELOG) corrective regimen sliding scale   SubCutaneous at bedtime  insulin lispro Injectable (ADMELOG) 6 Unit(s) SubCutaneous three times a day before meals  metoprolol succinate ER 50 milliGRAM(s) Oral daily      Review of Systems:  A 10-point review of systems was obtained.     Pertinent positives and negatives--  Constitutional: No fevers. No Chills. No Rigors.   Cardiovascular: No chest pain. No palpitations.  Respiratory: No shortness of breath. No cough.  Gastrointestinal: No nausea or vomiting. No diarrhea or constipation.   Psychiatric: Pleasant. Appropriate affect.    Review of systems otherwise negative except as previously noted.    Allergies: penicillins (Hives)    For details regarding the patient's past medical history, social history, family history, and other miscellaneous elements, please refer the initial infectious diseases consultation and/or the admitting history and physical examination for this admission.    Objective:  Vital Signs Last 24 Hrs  T(C): 37.1 (19 Jan 2021 04:40), Max: 37.1 (19 Jan 2021 04:40)  T(F): 98.8 (19 Jan 2021 04:40), Max: 98.8 (19 Jan 2021 04:40)  HR: 76 (19 Jan 2021 08:35) (72 - 78)  BP: 112/61 (19 Jan 2021 08:35) (101/53 - 118/71)  BP(mean): --  RR: 18 (19 Jan 2021 04:40) (18 - 18)  SpO2: 92% (19 Jan 2021 04:40) (92% - 92%)    Physical Examination:  General: no acute distress  HEENT: NC/AT, EOMI, anicteric, no oral lesions  Neck: supple, no palpable LAD  Cardio: S1, S2 heard, RRR, no murmurs  Resp: breath sounds heard bilaterally, no rales, wheezes or rhonchi  Abd: soft, NT, ND, + bowel sounds  Neuro: AAOx3, no obvious focal deficits  Ext: b./l LE lymphedema, stasis dermatitis. improved erythema. R foot s/p I&D in dressing  Skin: warm, dry, no visible rash      Laboratory Studies:                        12.4   9.09  )-----------( 357      ( 18 Jan 2021 09:41 )             38.0   01-18    139  |  100  |  14  ----------------------------<  174<H>  3.4<L>   |  32<H>  |  1.10    Ca    8.2<L>      18 Jan 2021 09:41        Microbiology: reviewed    Culture - Tissue with Gram Stain (collected 01-15-21 @ 19:03)  Source: .Tissue Other, right foot fibular sesmoid  Gram Stain (01-15-21 @ 22:20):    No polymorphonuclear cells seen per low power field    No organisms seen per oil power field  Preliminary Report (01-16-21 @ 18:17):    Rare Streptococcus agalactiae (Group B) isolated    Group B streptococci are susceptible to ampicillin,    penicillin and cefazolin, but may be resistant to    erythromycin and clindamycin.    Recommendations for intrapartum prophylaxis for Group B    streptococci are penicillin or ampicillin.    Culture - Tissue with Gram Stain (collected 01-15-21 @ 19:03)  Source: .Tissue Other, rigfht foot tibial sesmoid  Gram Stain (01-15-21 @ 22:19):    No polymorphonuclear cells seen per low power field    No organisms seen per oil power field  Preliminary Report (01-16-21 @ 18:09):    No growth to date.    Culture - Tissue with Gram Stain (collected 01-15-21 @ 19:03)  Source: .Tissue Other, right foot soft tissue culture  Gram Stain (01-15-21 @ 22:20):    No polymorphonuclear cells seen per low power field    No organisms seen per oil power field  Preliminary Report (01-16-21 @ 18:17):    Few Streptococcus agalactiae (Group B) isolated    Group B streptococci are susceptible to ampicillin,    penicillin and cefazolin, but may be resistant to    erythromycin and clindamycin.    Recommendations for intrapartum prophylaxis for Group B    streptococci are penicillin or ampicillin.        Radiology: reviewed

## 2021-01-19 NOTE — PROGRESS NOTE ADULT - PROBLEM SELECTOR PLAN 2
Pt evaluated and chart reviewed  Out wound vac dressing redressed  Wound vac running @ 125 mmHg  Home vac bedside  PICC line to be placed 1/20/21  Wound vac dressing change will be done 1/20/21 prior to d/c    Podiatry Stable  Podiatry will follow pt while in house    Wound Care Instructions:   1.  Keep dressing clean, dry and intact   2.  Make an appointment within 5/d of d/c at [facility] with Dr. Rangel or Dr. Lovell   3.  If n/v/f/c/sob/cp present, go to emergency department immediately    Wound vac instructions:  1. Leave dressing clean, dry and intact until vac change  2. Change wound vac every 3 days  3. Maintain vac at 100 mmHg  4. If vac stops running for 2 hours or more then change entire vac dressing.

## 2021-01-19 NOTE — PROGRESS NOTE ADULT - SUBJECTIVE AND OBJECTIVE BOX
70y year old Female seen at [facility] 1EAS 112 W1 for cellulites right foot.  S/P right foot OR debridement and foreign body removal by Dr. Lovell 1/16/21.  Dressing clean, dry and intact, but outer layer was loose w/ wound vac running at 125 mmHg.  Denied chills, nausea, vomiting, chest pain, shortness of breath, or calf pain at this time.    Allergies    penicillins (Hives)    Intolerances        MEDICATIONS  (STANDING):  atorvastatin 40 milliGRAM(s) Oral at bedtime  cefTRIAXone   IVPB      dextrose 40% Gel 15 Gram(s) Oral once  dextrose 5%. 1000 milliLiter(s) (50 mL/Hr) IV Continuous <Continuous>  dextrose 5%. 1000 milliLiter(s) (100 mL/Hr) IV Continuous <Continuous>  dextrose 50% Injectable 25 Gram(s) IV Push once  dextrose 50% Injectable 12.5 Gram(s) IV Push once  dextrose 50% Injectable 25 Gram(s) IV Push once  dextrose 50% Injectable 25 Gram(s) IV Push once  enalapril 20 milliGRAM(s) Oral daily  furosemide    Tablet 40 milliGRAM(s) Oral daily  glucagon  Injectable 1 milliGRAM(s) IntraMuscular once  glucagon  Injectable 1 milliGRAM(s) IntraMuscular once  heparin   Injectable 7500 Unit(s) SubCutaneous every 8 hours  insulin glargine Injectable (LANTUS) 35 Unit(s) SubCutaneous at bedtime  insulin lispro (ADMELOG) corrective regimen sliding scale   SubCutaneous three times a day before meals  insulin lispro (ADMELOG) corrective regimen sliding scale   SubCutaneous at bedtime  insulin lispro Injectable (ADMELOG) 8 Unit(s) SubCutaneous three times a day before meals  metoprolol succinate ER 50 milliGRAM(s) Oral daily    MEDICATIONS  (PRN):  EPINEPHrine     1 mG/mL Injectable 0.3 milliGRAM(s) IntraMuscular once PRN ANAPHYLAXIS ONLY      Vital Signs Last 24 Hrs  T(C): 37.1 (19 Jan 2021 13:15), Max: 37.1 (19 Jan 2021 04:40)  T(F): 98.7 (19 Jan 2021 13:15), Max: 98.8 (19 Jan 2021 04:40)  HR: 69 (19 Jan 2021 13:15) (69 - 76)  BP: 124/71 (19 Jan 2021 13:15) (112/61 - 124/71)  BP(mean): --  RR: 17 (19 Jan 2021 13:15) (17 - 18)  SpO2: 93% (19 Jan 2021 13:15) (92% - 93%)    PHYSICAL EXAM:  Vascular: DP & PT nonpalpable bilaterally due to edema, Capillary refill 3 seconds.  Nonpitting edema to leg b/l.  2+ pitting edema to dorsal foot b/l  Neurological: Light touch sensation intact bilaterally  Musculoskeletal: POP plantar right forefoot   Dermatological: Wound (1)    Previous:  Right foot sub 1st met head puncture wound.  Approximately 0.1 cm annular.  4 cc white purulence expressed.  PTB(+)    CBC Full  -  ( 19 Jan 2021 09:38 )  WBC Count : 9.71 K/uL  RBC Count : 4.10 M/uL  Hemoglobin : 11.8 g/dL  Hematocrit : 37.5 %  Platelet Count - Automated : 331 K/uL  Mean Cell Volume : 91.5 fl  Mean Cell Hemoglobin : 28.8 pg  Mean Cell Hemoglobin Concentration : 31.5 gm/dL  Auto Neutrophil # : 6.78 K/uL  Auto Lymphocyte # : 1.32 K/uL  Auto Monocyte # : 0.96 K/uL  Auto Eosinophil # : 0.48 K/uL  Auto Basophil # : 0.04 K/uL  Auto Neutrophil % : 69.9 %  Auto Lymphocyte % : 13.6 %  Auto Monocyte % : 9.9 %  Auto Eosinophil % : 4.9 %  Auto Basophil % : 0.4 %      ----------CHEM PANEL----------            Imaging: ----------

## 2021-01-20 VITALS
SYSTOLIC BLOOD PRESSURE: 120 MMHG | HEART RATE: 72 BPM | RESPIRATION RATE: 18 BRPM | OXYGEN SATURATION: 94 % | DIASTOLIC BLOOD PRESSURE: 73 MMHG | TEMPERATURE: 98 F

## 2021-01-20 LAB
ANION GAP SERPL CALC-SCNC: 4 MMOL/L — LOW (ref 5–17)
BUN SERPL-MCNC: 14 MG/DL — SIGNIFICANT CHANGE UP (ref 7–23)
CALCIUM SERPL-MCNC: 8.3 MG/DL — LOW (ref 8.5–10.1)
CHLORIDE SERPL-SCNC: 101 MMOL/L — SIGNIFICANT CHANGE UP (ref 96–108)
CO2 SERPL-SCNC: 34 MMOL/L — HIGH (ref 22–31)
CREAT SERPL-MCNC: 1.1 MG/DL — SIGNIFICANT CHANGE UP (ref 0.5–1.3)
CULTURE RESULTS: SIGNIFICANT CHANGE UP
GLUCOSE SERPL-MCNC: 159 MG/DL — HIGH (ref 70–99)
HCT VFR BLD CALC: 35.9 % — SIGNIFICANT CHANGE UP (ref 34.5–45)
HGB BLD-MCNC: 11.3 G/DL — LOW (ref 11.5–15.5)
MCHC RBC-ENTMCNC: 28.8 PG — SIGNIFICANT CHANGE UP (ref 27–34)
MCHC RBC-ENTMCNC: 31.5 GM/DL — LOW (ref 32–36)
MCV RBC AUTO: 91.3 FL — SIGNIFICANT CHANGE UP (ref 80–100)
NRBC # BLD: 0 /100 WBCS — SIGNIFICANT CHANGE UP (ref 0–0)
PLATELET # BLD AUTO: 298 K/UL — SIGNIFICANT CHANGE UP (ref 150–400)
POTASSIUM SERPL-MCNC: 4.4 MMOL/L — SIGNIFICANT CHANGE UP (ref 3.5–5.3)
POTASSIUM SERPL-SCNC: 4.4 MMOL/L — SIGNIFICANT CHANGE UP (ref 3.5–5.3)
RBC # BLD: 3.93 M/UL — SIGNIFICANT CHANGE UP (ref 3.8–5.2)
RBC # FLD: 13.4 % — SIGNIFICANT CHANGE UP (ref 10.3–14.5)
SODIUM SERPL-SCNC: 139 MMOL/L — SIGNIFICANT CHANGE UP (ref 135–145)
SPECIMEN SOURCE: SIGNIFICANT CHANGE UP
WBC # BLD: 11.7 K/UL — HIGH (ref 3.8–10.5)
WBC # FLD AUTO: 11.7 K/UL — HIGH (ref 3.8–10.5)

## 2021-01-20 PROCEDURE — 85730 THROMBOPLASTIN TIME PARTIAL: CPT

## 2021-01-20 PROCEDURE — 80053 COMPREHEN METABOLIC PANEL: CPT

## 2021-01-20 PROCEDURE — 87640 STAPH A DNA AMP PROBE: CPT

## 2021-01-20 PROCEDURE — 85025 COMPLETE CBC W/AUTO DIFF WBC: CPT

## 2021-01-20 PROCEDURE — 88304 TISSUE EXAM BY PATHOLOGIST: CPT

## 2021-01-20 PROCEDURE — 93970 EXTREMITY STUDY: CPT

## 2021-01-20 PROCEDURE — 86803 HEPATITIS C AB TEST: CPT

## 2021-01-20 PROCEDURE — 78800 RP LOCLZJ TUM 1 AREA 1 D IMG: CPT

## 2021-01-20 PROCEDURE — 82962 GLUCOSE BLOOD TEST: CPT

## 2021-01-20 PROCEDURE — 87070 CULTURE OTHR SPECIMN AEROBIC: CPT

## 2021-01-20 PROCEDURE — 76937 US GUIDE VASCULAR ACCESS: CPT

## 2021-01-20 PROCEDURE — 36573 INSJ PICC RS&I 5 YR+: CPT

## 2021-01-20 PROCEDURE — 87641 MR-STAPH DNA AMP PROBE: CPT

## 2021-01-20 PROCEDURE — 86769 SARS-COV-2 COVID-19 ANTIBODY: CPT

## 2021-01-20 PROCEDURE — 73720 MRI LWR EXTREMITY W/O&W/DYE: CPT

## 2021-01-20 PROCEDURE — 73630 X-RAY EXAM OF FOOT: CPT

## 2021-01-20 PROCEDURE — 83605 ASSAY OF LACTIC ACID: CPT

## 2021-01-20 PROCEDURE — 88300 SURGICAL PATH GROSS: CPT

## 2021-01-20 PROCEDURE — 83036 HEMOGLOBIN GLYCOSYLATED A1C: CPT

## 2021-01-20 PROCEDURE — 85652 RBC SED RATE AUTOMATED: CPT

## 2021-01-20 PROCEDURE — 87075 CULTR BACTERIA EXCEPT BLOOD: CPT

## 2021-01-20 PROCEDURE — 36415 COLL VENOUS BLD VENIPUNCTURE: CPT

## 2021-01-20 PROCEDURE — 86900 BLOOD TYPING SEROLOGIC ABO: CPT

## 2021-01-20 PROCEDURE — C1889: CPT

## 2021-01-20 PROCEDURE — 99239 HOSP IP/OBS DSCHRG MGMT >30: CPT

## 2021-01-20 PROCEDURE — 71045 X-RAY EXAM CHEST 1 VIEW: CPT

## 2021-01-20 PROCEDURE — 86850 RBC ANTIBODY SCREEN: CPT

## 2021-01-20 PROCEDURE — A9521: CPT

## 2021-01-20 PROCEDURE — 99024 POSTOP FOLLOW-UP VISIT: CPT

## 2021-01-20 PROCEDURE — U0005: CPT

## 2021-01-20 PROCEDURE — 76000 FLUOROSCOPY <1 HR PHYS/QHP: CPT

## 2021-01-20 PROCEDURE — 93923 UPR/LXTR ART STDY 3+ LVLS: CPT

## 2021-01-20 PROCEDURE — 96375 TX/PRO/DX INJ NEW DRUG ADDON: CPT

## 2021-01-20 PROCEDURE — 93926 LOWER EXTREMITY STUDY: CPT

## 2021-01-20 PROCEDURE — 99285 EMERGENCY DEPT VISIT HI MDM: CPT | Mod: 25

## 2021-01-20 PROCEDURE — 86901 BLOOD TYPING SEROLOGIC RH(D): CPT

## 2021-01-20 PROCEDURE — 87040 BLOOD CULTURE FOR BACTERIA: CPT

## 2021-01-20 PROCEDURE — 88311 DECALCIFY TISSUE: CPT

## 2021-01-20 PROCEDURE — U0003: CPT

## 2021-01-20 PROCEDURE — C1751: CPT

## 2021-01-20 PROCEDURE — 73590 X-RAY EXAM OF LOWER LEG: CPT

## 2021-01-20 PROCEDURE — 93005 ELECTROCARDIOGRAM TRACING: CPT

## 2021-01-20 PROCEDURE — 77001 FLUOROGUIDE FOR VEIN DEVICE: CPT

## 2021-01-20 PROCEDURE — 85610 PROTHROMBIN TIME: CPT

## 2021-01-20 PROCEDURE — 87186 SC STD MICRODIL/AGAR DIL: CPT

## 2021-01-20 PROCEDURE — 80048 BASIC METABOLIC PNL TOTAL CA: CPT

## 2021-01-20 PROCEDURE — 85027 COMPLETE CBC AUTOMATED: CPT

## 2021-01-20 PROCEDURE — 97161 PT EVAL LOW COMPLEX 20 MIN: CPT

## 2021-01-20 PROCEDURE — 96374 THER/PROPH/DIAG INJ IV PUSH: CPT

## 2021-01-20 RX ORDER — CHLORHEXIDINE GLUCONATE 213 G/1000ML
1 SOLUTION TOPICAL
Refills: 0 | Status: DISCONTINUED | OUTPATIENT
Start: 2021-01-20 | End: 2021-01-20

## 2021-01-20 RX ORDER — SODIUM CHLORIDE 9 MG/ML
10 INJECTION INTRAMUSCULAR; INTRAVENOUS; SUBCUTANEOUS
Refills: 0 | Status: DISCONTINUED | OUTPATIENT
Start: 2021-01-20 | End: 2021-01-20

## 2021-01-20 RX ADMIN — Medication 40 MILLIGRAM(S): at 05:49

## 2021-01-20 RX ADMIN — Medication 50 MILLIGRAM(S): at 05:49

## 2021-01-20 RX ADMIN — Medication 8 UNIT(S): at 12:17

## 2021-01-20 RX ADMIN — Medication 20 MILLIGRAM(S): at 05:49

## 2021-01-20 RX ADMIN — Medication 8 UNIT(S): at 08:17

## 2021-01-20 RX ADMIN — CEFTRIAXONE 100 MILLIGRAM(S): 500 INJECTION, POWDER, FOR SOLUTION INTRAMUSCULAR; INTRAVENOUS at 08:18

## 2021-01-20 RX ADMIN — Medication 2: at 12:16

## 2021-01-20 NOTE — PROGRESS NOTE ADULT - PROBLEM SELECTOR PLAN 5
-Continue home lasix 40mg qd

## 2021-01-20 NOTE — PROGRESS NOTE ADULT - SUBJECTIVE AND OBJECTIVE BOX
CAPILLARY BLOOD GLUCOSE      POCT Blood Glucose.: 139 mg/dL (20 Jan 2021 07:33)  POCT Blood Glucose.: 171 mg/dL (19 Jan 2021 21:55)  POCT Blood Glucose.: 154 mg/dL (19 Jan 2021 16:41)  POCT Blood Glucose.: 246 mg/dL (19 Jan 2021 11:34)      Vital Signs Last 24 Hrs  T(C): 36.7 (20 Jan 2021 04:55), Max: 37.1 (19 Jan 2021 13:15)  T(F): 98 (20 Jan 2021 04:55), Max: 98.7 (19 Jan 2021 13:15)  HR: 71 (20 Jan 2021 04:55) (69 - 76)  BP: 114/66 (20 Jan 2021 04:55) (114/66 - 134/61)  BP(mean): --  RR: 18 (20 Jan 2021 04:55) (17 - 18)  SpO2: 94% (20 Jan 2021 04:55) (93% - 94%)    Respiratory: CTA B/L  CV: RRR, S1S2, no murmurs, rubs or gallops  Abdominal: Soft, NT, ND +BS, Last BM  Extremities: No edema, + peripheral pulses     01-20    139  |  101  |  14  ----------------------------<  159<H>  4.4   |  34<H>  |  1.10    Ca    8.3<L>      20 Jan 2021 08:47        atorvastatin 40 milliGRAM(s) Oral at bedtime  dextrose 40% Gel 15 Gram(s) Oral once  dextrose 50% Injectable 25 Gram(s) IV Push once  dextrose 50% Injectable 25 Gram(s) IV Push once  dextrose 50% Injectable 12.5 Gram(s) IV Push once  dextrose 50% Injectable 25 Gram(s) IV Push once  glucagon  Injectable 1 milliGRAM(s) IntraMuscular once  glucagon  Injectable 1 milliGRAM(s) IntraMuscular once  insulin glargine Injectable (LANTUS) 35 Unit(s) SubCutaneous at bedtime  insulin lispro (ADMELOG) corrective regimen sliding scale   SubCutaneous three times a day before meals  insulin lispro (ADMELOG) corrective regimen sliding scale   SubCutaneous at bedtime  insulin lispro Injectable (ADMELOG) 8 Unit(s) SubCutaneous three times a day before meals

## 2021-01-20 NOTE — PROGRESS NOTE ADULT - PROBLEM SELECTOR PLAN 3
- A1c 6.6%  - cont lantus 35 units qhs  - increase admelog 8 units 3x/day before meals  - cont mod dose admelog scale coverage qac/qhs  - pt will follow up withher endocrinologist, Dr. BLACKMAN on discharge. Pt feels comfortable adjusting premeal insulin at home based on her F.S.

## 2021-01-20 NOTE — PROGRESS NOTE ADULT - PROBLEM SELECTOR PROBLEM 3
Diabetes mellitus type 2 in obese

## 2021-01-20 NOTE — PROGRESS NOTE ADULT - ATTENDING COMMENTS
Discharge home today: time spent 60 min coordinating care and review of follow up instructions, medications
Infectious Diseases will continue to follow. Please call with any questions.   Felipa Rahman M.D.  [facility], Division of Infectious Diseases [phone]  For over the weekend and after hours, please call [phone]
Infectious Diseases will continue to follow. Please call with any questions.   Felipa Rahman M.D.  [facility], Division of Infectious Diseases [phone]  For over the weekend and after hours, please call [phone]
Infectious Diseases will continue to follow. Please call with any questions.   Felipa Rahman M.D.  [facility], Division of Infectious Diseases [phone]  For over the weekend and after hours, please call [phone]  [facility] service and ID attending Dr. Clark's service through the long weekend 1/16-1/18
Infectious Diseases will continue to follow. Please call with any questions.   Felipa Rahman M.D.  [facility], Division of Infectious Diseases [phone]  For over the weekend and after hours, please call [phone]  I am covering the [facility] service and ID attending Dr. Clark's service through the long weekend 1/16-1/18
Infectious Diseases will continue to follow. Please call with any questions.   Felipa Rahman M.D.  [facility], Division of Infectious Diseases [phone]  For over the weekend and after hours, please call [phone]
Infectious Diseases will continue to follow. Please call with any questions.   Felipa Rahman M.D.  [facility], Division of Infectious Diseases [phone]  For over the weekend and after hours, please call [phone]  I am [facility] the [facility] service and ID attending Dr. Clark's service through the long weekend 1/16-1/18
Infectious Diseases will continue to follow. Please call with any questions.   Felipa Rahman M.D.  [facility], Division of Infectious Diseases [phone]  For over the weekend and after hours, please call [phone]
On Lantus 17units qhs (usually gets 35untis at home), monitor accuchecks  A1c ordered  Medically optimized for podiatry planned I&D  Routine hemodynamic monitoring  PT eval post procedure
71yo F adm for RLE cellulitis in setting on lymphedema.  - POD #1 s/p Removal, foreign body, deep & Deep incision and drainage of multiple areas of foot.  - DM uncontrolled, Increase lantus dose, (usually gets 35untis at home), monitor accuchecks, moderate dose SSI, endo consulted; apprec recs.   - MRI ordered per podiatry, but pt unable to get into machine due to body habitus. Bone scan ordered per podiatry; suspicious for osteomyelitis of right 1st & 2nd digits distally. Will follow up with podiatry about plan. Pt expressed desire to f/u with her podiatrist at [place] for further work up   _ Discharge plan pending wound vac for home. Case management made aware.   - Cont IV abx per ID  - DVT ppx: heparin resumed   PT eval post procedure
69yo F adm for RLE cellulitis in setting on lymphedema.  - POD #0 s/p Removal, foreign body, deep & Deep incision and drainage of multiple areas of foot.  - DM uncontrolled, Increase lantus dose, (usually gets 35untis at home), monitor accuchecks, moderate dose SSI, endo consulted; apprec recs.   - MRI ordered per podiatry, but pt unable to get into machine due to body habitus. Bone scan ordered per podiatry; suspicious for osteomyelitis of right 1st & 2nd digits distally.   - Cont IV abx. Will discuss with podiatry about plan for osteo  - DVT ppx: heparin resumed   PT eval post procedure
71yo F adm for RLE cellulitis in setting on lymphedema  - DM uncontrolled, Increase lantus dose, (usually gets 35untis at home), monitor accuchecks, moderate dose SSI, endo consult.   - NPO at [place], hold heparin in AM  - MRI ordered per podiatry, but pt unable to get into machine due to body habitus. Bone scan ordered per podiatry,    - Medically optimized for podiatry planned I&D  Routine hemodynamic monitoring  PT eval post procedure
Pending bone path  PICC line 1/20, plan for IV abx  Wound vac delivered to hosp  Insulin increased per endo  Optimize gluc control for proper wound healing

## 2021-01-20 NOTE — PROGRESS NOTE ADULT - PROBLEM SELECTOR PLAN 4
-Continue home enalapril 20mg qhs, metoprolol 50 qd with hold parameters  -Monitor routine hemodynamics

## 2021-01-20 NOTE — PROGRESS NOTE ADULT - SUBJECTIVE AND OBJECTIVE BOX
[facility], Division of Infectious Diseases  JOE Slaughter Y. Patel, S. Shah  [phone]    Name: EVELYN LOPEZ  Age: 70y  Gender: Female  MRN: [id number]  Note Date: 01-20-21    Interval History:  Patient seen and examined at bedside this morning  No acute overnight events. Afebrile  No complaints. Pending PICC  Notes reviewed    Antibiotics:  cefTRIAXone   IVPB      cefTRIAXone   IVPB 1000 milliGRAM(s) IV Intermittent every 24 hours      Medications:  atorvastatin 40 milliGRAM(s) Oral at bedtime  cefTRIAXone   IVPB      cefTRIAXone   IVPB 1000 milliGRAM(s) IV Intermittent every 24 hours  dextrose 40% Gel 15 Gram(s) Oral once  dextrose 5%. 1000 milliLiter(s) IV Continuous <Continuous>  dextrose 5%. 1000 milliLiter(s) IV Continuous <Continuous>  dextrose 50% Injectable 25 Gram(s) IV Push once  dextrose 50% Injectable 25 Gram(s) IV Push once  dextrose 50% Injectable 12.5 Gram(s) IV Push once  dextrose 50% Injectable 25 Gram(s) IV Push once  enalapril 20 milliGRAM(s) Oral daily  EPINEPHrine     1 mG/mL Injectable 0.3 milliGRAM(s) IntraMuscular once PRN  furosemide    Tablet 40 milliGRAM(s) Oral daily  glucagon  Injectable 1 milliGRAM(s) IntraMuscular once  glucagon  Injectable 1 milliGRAM(s) IntraMuscular once  heparin   Injectable 7500 Unit(s) SubCutaneous every 8 hours  insulin glargine Injectable (LANTUS) 35 Unit(s) SubCutaneous at bedtime  insulin lispro (ADMELOG) corrective regimen sliding scale   SubCutaneous three times a day before meals  insulin lispro (ADMELOG) corrective regimen sliding scale   SubCutaneous at bedtime  insulin lispro Injectable (ADMELOG) 6 Unit(s) SubCutaneous three times a day before meals  metoprolol succinate ER 50 milliGRAM(s) Oral daily      Review of Systems:  A 10-point review of systems was obtained.     Pertinent positives and negatives--  Constitutional: No fevers. No Chills. No Rigors.   Cardiovascular: No chest pain. No palpitations.  Respiratory: No shortness of breath. No cough.  Gastrointestinal: No nausea or vomiting. No diarrhea or constipation.   Psychiatric: Pleasant. Appropriate affect.    Review of systems otherwise negative except as previously noted.    Allergies: penicillins (Hives)    For details regarding the patient's past medical history, social history, family history, and other miscellaneous elements, please refer the initial infectious diseases consultation and/or the admitting history and physical examination for this admission.    Objective:  Vital Signs Last 24 Hrs  T(C): 36.7 (20 Jan 2021 04:55), Max: 37.1 (19 Jan 2021 13:15)  T(F): 98 (20 Jan 2021 04:55), Max: 98.7 (19 Jan 2021 13:15)  HR: 71 (20 Jan 2021 04:55) (69 - 76)  BP: 114/66 (20 Jan 2021 04:55) (114/66 - 134/61)  BP(mean): --  RR: 18 (20 Jan 2021 04:55) (17 - 18)  SpO2: 94% (20 Jan 2021 04:55) (93% - 94%)    Physical Examination:  General: no acute distress  HEENT: NC/AT, EOMI, anicteric, no oral lesions  Neck: supple, no palpable LAD  Cardio: S1, S2 heard, RRR, no murmurs  Resp: breath sounds heard bilaterally, no rales, wheezes or rhonchi  Abd: soft, NT, ND, + bowel sounds  Neuro: AAOx3, no obvious focal deficits  Ext: b./l LE lymphedema, stasis dermatitis. improved erythema. R foot s/p I&D in dressing  Skin: warm, dry, no visible rash      Laboratory Studies:                                   11.3   11.70 )-----------( 298      ( 20 Jan 2021 08:47 )             35.9   01-20    139  |  101  |  14  ----------------------------<  159<H>  4.4   |  34<H>  |  1.10    Ca    8.3<L>      20 Jan 2021 08:47          Microbiology: reviewed    Culture - Tissue with Gram Stain (collected 01-15-21 @ 19:03)  Source: .Tissue Other, right foot fibular sesmoid  Gram Stain (01-15-21 @ 22:20):    No polymorphonuclear cells seen per low power field    No organisms seen per oil power field  Preliminary Report (01-16-21 @ 18:17):    Rare Streptococcus agalactiae (Group B) isolated    Group B streptococci are susceptible to ampicillin,    penicillin and cefazolin, but may be resistant to    erythromycin and clindamycin.    Recommendations for intrapartum prophylaxis for Group B    streptococci are penicillin or ampicillin.    Culture - Tissue with Gram Stain (collected 01-15-21 @ 19:03)  Source: .Tissue Other, rigfht foot tibial sesmoid  Gram Stain (01-15-21 @ 22:19):    No polymorphonuclear cells seen per low power field    No organisms seen per oil power field  Preliminary Report (01-16-21 @ 18:09):    No growth to date.    Culture - Tissue with Gram Stain (collected 01-15-21 @ 19:03)  Source: .Tissue Other, right foot soft tissue culture  Gram Stain (01-15-21 @ 22:20):    No polymorphonuclear cells seen per low power field    No organisms seen per oil power field  Preliminary Report (01-16-21 @ 18:17):    Few Streptococcus agalactiae (Group B) isolated    Group B streptococci are susceptible to ampicillin,    penicillin and cefazolin, but may be resistant to    erythromycin and clindamycin.    Recommendations for intrapartum prophylaxis for Group B    streptococci are penicillin or ampicillin.        Radiology: reviewed

## 2021-01-20 NOTE — PROGRESS NOTE ADULT - ASSESSMENT
Pt is a 70W w/ PMHx of HTN, DM2, LBBB (w/ neg stress, TTE 6/21 w/ mild AS), lymphedema presents to ED c/o RLE pain/swelling/redness x3 days. Admitted for RLE cellulitis, found to have foreign body in foot    RLE Cellulitis/R foot OM  Foreign body in foot  Fevers-resolved  Leukocytosis-resolved  -imaging reviewed.  suspicious for OM of 1st/2nd toe  -BCx NGTD  -WCx MSSA, Strep  -OR TCx GBS   S/p I&D and FB removal on 1/15  Appreciate podiatry recs re: results of NM scan if any intervention planned. Spoke to pt, not interested in other surgery.   Pt tolerating ceftriaxone w/o pre-medication.   C/w ceftriaxone 2gm IV q24h for total 6 wk course of ceftriaxone 2gm IV until 2/25/2021  Pending PICC line. Script placed in chart.   Pt will need weekly BMP, CBC, ESR, CRP to be faxed to [phone]  Pt has appt w/ me on 2/8/2021 via televisit; pt will be contacted w/ appt details    Penicillin Allergy  Pt reports severe rash and hives as well as SOB and feelings of throat closing up w/ use of penicillins  Pt tolerating cephalosporins w/o allergic rxn    DM2  -strict glucose control in setting of acute infection    Lymphedema  -c/w home lasix  -c/w limb elevation

## 2021-01-20 NOTE — PROGRESS NOTE ADULT - REASON FOR ADMISSION
RLE cellulitis, foreign body in foot

## 2021-01-20 NOTE — PROGRESS NOTE ADULT - SUBJECTIVE AND OBJECTIVE BOX
Contact Number: [phone]    Patient is a 70y old  Female who presents with a chief complaint of RLE cellulitis, foreign body in foot (19 Jan 2021 14:59)      SUBJECTIVE / OVERNIGHT EVENTS: " I feel fine." Denies CP, SOB, abd pain. Denies foot pain and has been ambulating with her cane. She is tolerating the ROcephin- denies voice hoarseness, lip/tongue/facial swelling. Denies diarrhea. Dshe denies hives or pruritus.    MEDICATIONS  (STANDING):  atorvastatin 40 milliGRAM(s) Oral at bedtime  cefTRIAXone   IVPB 2000 milliGRAM(s) IV Intermittent every 24 hours  cefTRIAXone   IVPB      dextrose 40% Gel 15 Gram(s) Oral once  dextrose 5%. 1000 milliLiter(s) (50 mL/Hr) IV Continuous <Continuous>  dextrose 5%. 1000 milliLiter(s) (100 mL/Hr) IV Continuous <Continuous>  dextrose 50% Injectable 25 Gram(s) IV Push once  dextrose 50% Injectable 25 Gram(s) IV Push once  dextrose 50% Injectable 12.5 Gram(s) IV Push once  dextrose 50% Injectable 25 Gram(s) IV Push once  enalapril 20 milliGRAM(s) Oral daily  furosemide    Tablet 40 milliGRAM(s) Oral daily  glucagon  Injectable 1 milliGRAM(s) IntraMuscular once  glucagon  Injectable 1 milliGRAM(s) IntraMuscular once  insulin glargine Injectable (LANTUS) 35 Unit(s) SubCutaneous at bedtime  insulin lispro (ADMELOG) corrective regimen sliding scale   SubCutaneous three times a day before meals  insulin lispro (ADMELOG) corrective regimen sliding scale   SubCutaneous at bedtime  insulin lispro Injectable (ADMELOG) 8 Unit(s) SubCutaneous three times a day before meals  metoprolol succinate ER 50 milliGRAM(s) Oral daily    MEDICATIONS  (PRN):  EPINEPHrine     1 mG/mL Injectable 0.3 milliGRAM(s) IntraMuscular once PRN ANAPHYLAXIS ONLY      Vital Signs Last 24 Hrs  T(C): 36.7 (20 Jan 2021 04:55), Max: 37.1 (19 Jan 2021 13:15)  T(F): 98 (20 Jan 2021 04:55), Max: 98.7 (19 Jan 2021 13:15)  HR: 71 (20 Jan 2021 04:55) (69 - 76)  BP: 114/66 (20 Jan 2021 04:55) (114/66 - 134/61)  BP(mean): --  RR: 18 (20 Jan 2021 04:55) (17 - 18)  SpO2: 94% (20 Jan 2021 04:55) (93% - 94%)  CAPILLARY BLOOD GLUCOSE      POCT Blood Glucose.: 139 mg/dL (20 Jan 2021 07:33)  POCT Blood Glucose.: 171 mg/dL (19 Jan 2021 21:55)  POCT Blood Glucose.: 154 mg/dL (19 Jan 2021 16:41)  POCT Blood Glucose.: 246 mg/dL (19 Jan 2021 11:34)    I&O's Summary    19 Jan 2021 07:01  -  20 Jan 2021 07:00  --------------------------------------------------------  IN: 50 mL / OUT: 0 mL / NET: 50 mL        PHYSICAL EXAM:  GENERAL: NAD, well-developed; morbidly obese  EYES: EOMI, PERRLA, conjunctiva and sclera clear  NECK: Supple  CHEST/LUNG: Clear to auscultation bilaterally; No wheeze  HEART: Regular rate and rhythm; No murmurs  ABDOMEN: Soft, Nontender, Nondistended; Bowel sounds present  EXTREMITIES:  No clubbing, cyanosis + chronic 3+ LE edema  PSYCH: AAOx3  NEUROLOGY: no gross sensory deficits to light touch  Muscle strength 5/5 b/l UE (handgrip and elbow flexion/ext) and LE (DF/PF)  No clonus   Speech: fluent  SKIN: No visible hives    LABS:                        11.8   9.71  )-----------( 331      ( 19 Jan 2021 09:38 )             37.5     01-19    138  |  99  |  15  ----------------------------<  221<H>  3.9   |  34<H>  |  1.10    Ca    8.2<L>      19 Jan 2021 09:38                    RADIOLOGY & ADDITIONAL TESTS:    Imaging Personally Reviewed:    Consultant(s) Notes Reviewed:      Care Discussed with Consultants/Other Providers:

## 2021-01-20 NOTE — PROGRESS NOTE ADULT - PROVIDER SPECIALTY LIST ADULT
Endocrinology
Endocrinology
Infectious Disease
Infectious Disease
Podiatry
Anesthesia
Endocrinology
Podiatry
Hospitalist
Infectious Disease
Podiatry
Endocrinology
Endocrinology
Infectious Disease
Hospitalist
Podiatry
Hospitalist

## 2021-01-20 NOTE — PROGRESS NOTE ADULT - PROBLEM SELECTOR PLAN 2
Transported for PICC line  Podiatry will place new wound vac dressing on right foot when pt returns to room    Wound Care Instructions:   1.  Keep dressing clean, dry and intact   2.  Make an appointment within 5/d of d/c at [facility] with Dr. Rangel or Dr. Lovell   3.  If n/v/f/c/sob/cp present, go to emergency department immediately    Wound vac instructions:  1. Leave dressing clean, dry and intact until vac change  2. Change wound vac every 3 days  3. Maintain vac at 100 mmHg  4. If vac stops running for 2 hours or more then change entire vac dressing.

## 2021-01-20 NOTE — PROGRESS NOTE ADULT - ASSESSMENT
Patient is 71 yo F PMHx HTN, DM2,  LBBB, chronic lymphedema presents to ED c/o right lower leg pain/swelling and redness secondary to right foot osteomyelitis and foreign body S/p I&D and foreign body removal.

## 2021-01-20 NOTE — PROGRESS NOTE ADULT - SUBJECTIVE AND OBJECTIVE BOX
70 year old Female seen at [facility] 1EAS 112 W1 for cellulites right foot.  S/P right foot OR debridement and foreign body removal by Dr. Lovell 1/16/21.  Dressing clean, dry and intact.  Pt was being transported for PICC line placement.      Allergies    penicillins (Hives)    Intolerances        MEDICATIONS  (STANDING):  atorvastatin 40 milliGRAM(s) Oral at bedtime  cefTRIAXone   IVPB 2000 milliGRAM(s) IV Intermittent every 24 hours  cefTRIAXone   IVPB      dextrose 40% Gel 15 Gram(s) Oral once  dextrose 5%. 1000 milliLiter(s) (50 mL/Hr) IV Continuous <Continuous>  dextrose 5%. 1000 milliLiter(s) (100 mL/Hr) IV Continuous <Continuous>  dextrose 50% Injectable 25 Gram(s) IV Push once  dextrose 50% Injectable 25 Gram(s) IV Push once  dextrose 50% Injectable 12.5 Gram(s) IV Push once  dextrose 50% Injectable 25 Gram(s) IV Push once  enalapril 20 milliGRAM(s) Oral daily  furosemide    Tablet 40 milliGRAM(s) Oral daily  glucagon  Injectable 1 milliGRAM(s) IntraMuscular once  glucagon  Injectable 1 milliGRAM(s) IntraMuscular once  insulin glargine Injectable (LANTUS) 35 Unit(s) SubCutaneous at bedtime  insulin lispro (ADMELOG) corrective regimen sliding scale   SubCutaneous three times a day before meals  insulin lispro (ADMELOG) corrective regimen sliding scale   SubCutaneous at bedtime  insulin lispro Injectable (ADMELOG) 8 Unit(s) SubCutaneous three times a day before meals  metoprolol succinate ER 50 milliGRAM(s) Oral daily    MEDICATIONS  (PRN):  EPINEPHrine     1 mG/mL Injectable 0.3 milliGRAM(s) IntraMuscular once PRN ANAPHYLAXIS ONLY      Vital Signs Last 24 Hrs  T(C): 36.7 (20 Jan 2021 04:55), Max: 37.1 (19 Jan 2021 13:15)  T(F): 98 (20 Jan 2021 04:55), Max: 98.7 (19 Jan 2021 13:15)  HR: 71 (20 Jan 2021 04:55) (69 - 76)  BP: 114/66 (20 Jan 2021 04:55) (114/66 - 134/61)  BP(mean): --  RR: 18 (20 Jan 2021 04:55) (17 - 18)  SpO2: 94% (20 Jan 2021 04:55) (93% - 94%)    PHYSICAL EXAM:  Not performed    CBC Full  -  ( 20 Jan 2021 08:47 )  WBC Count : 11.70 K/uL  RBC Count : 3.93 M/uL  Hemoglobin : 11.3 g/dL  Hematocrit : 35.9 %  Platelet Count - Automated : 298 K/uL  Mean Cell Volume : 91.3 fl  Mean Cell Hemoglobin : 28.8 pg  Mean Cell Hemoglobin Concentration : 31.5 gm/dL  Auto Neutrophil # : x  Auto Lymphocyte # : x  Auto Monocyte # : x  Auto Eosinophil # : x  Auto Basophil # : x  Auto Neutrophil % : x  Auto Lymphocyte % : x  Auto Monocyte % : x  Auto Eosinophil % : x  Auto Basophil % : x      ----------CHEM PANEL----------            Imaging: ----------

## 2021-01-20 NOTE — PROGRESS NOTE ADULT - PROBLEM SELECTOR PROBLEM 2
Foreign body in right foot, initial encounter

## 2021-01-20 NOTE — PROGRESS NOTE ADULT - PROBLEM SELECTOR PLAN 1
Cellulitis and osteomyelitis of right foot  - Pt on ceftriaxone iv to be  continued on discharge until 2/25/21  - Wound culture with staph aureus, blood culturex2 NGTD  - Podiatry following, Dr. Lovell  - S/p I&D on 1/15  - Bone scan with findings suspicious for osteomyelitis of the first and second digits of the right foot distally.  Bone pathology pending- even if path is negative for OM, ID strongly favors treatment for Osteomyelitis based on bone scan results. Plan is for PICC line placement today.  contiuned treatment of wound with wound vac- podiatry to change dressing today prior to discharge

## 2021-01-22 PROBLEM — E11.9 TYPE 2 DIABETES MELLITUS WITHOUT COMPLICATIONS: Chronic | Status: ACTIVE | Noted: 2021-01-12

## 2021-01-22 PROBLEM — I89.0 LYMPHEDEMA, NOT ELSEWHERE CLASSIFIED: Chronic | Status: ACTIVE | Noted: 2021-01-12

## 2021-01-22 PROBLEM — I10 ESSENTIAL (PRIMARY) HYPERTENSION: Chronic | Status: ACTIVE | Noted: 2021-01-12

## 2021-01-27 PROBLEM — Z00.00 ENCOUNTER FOR PREVENTIVE HEALTH EXAMINATION: Status: ACTIVE | Noted: 2021-01-27

## 2021-01-29 ENCOUNTER — RESULT REVIEW (OUTPATIENT)
Age: 71
End: 2021-01-29

## 2021-01-29 ENCOUNTER — OUTPATIENT (OUTPATIENT)
Dept: OUTPATIENT SERVICES | Facility: HOSPITAL | Age: 71
LOS: 1 days | Discharge: HOME | End: 2021-01-29
Payer: MEDICARE

## 2021-01-29 ENCOUNTER — APPOINTMENT (OUTPATIENT)
Dept: WOUND CARE | Facility: HOSPITAL | Age: 71
End: 2021-01-29
Payer: MEDICARE

## 2021-01-29 VITALS
TEMPERATURE: 97.3 F | RESPIRATION RATE: 20 BRPM | DIASTOLIC BLOOD PRESSURE: 74 MMHG | SYSTOLIC BLOOD PRESSURE: 161 MMHG | OXYGEN SATURATION: 99 % | HEIGHT: 71 IN | BODY MASS INDEX: 41.02 KG/M2 | HEART RATE: 72 BPM | WEIGHT: 293 LBS

## 2021-01-29 DIAGNOSIS — Z82.5 FAMILY HISTORY OF ASTHMA AND OTHER CHRONIC LOWER RESPIRATORY DISEASES: ICD-10-CM

## 2021-01-29 DIAGNOSIS — S02.91XA UNSPECIFIED FRACTURE OF SKULL, INITIAL ENCOUNTER FOR CLOSED FRACTURE: Chronic | ICD-10-CM

## 2021-01-29 DIAGNOSIS — L03.115 CELLULITIS OF RIGHT LOWER LIMB: ICD-10-CM

## 2021-01-29 DIAGNOSIS — M75.00 ADHESIVE CAPSULITIS OF UNSPECIFIED SHOULDER: Chronic | ICD-10-CM

## 2021-01-29 DIAGNOSIS — Z98.890 OTHER SPECIFIED POSTPROCEDURAL STATES: Chronic | ICD-10-CM

## 2021-01-29 DIAGNOSIS — L03.116 CELLULITIS OF RIGHT LOWER LIMB: ICD-10-CM

## 2021-01-29 DIAGNOSIS — Z86.69 PERSONAL HISTORY OF OTHER DISEASES OF THE NERVOUS SYSTEM AND SENSE ORGANS: Chronic | ICD-10-CM

## 2021-01-29 DIAGNOSIS — S91.309A UNSPECIFIED OPEN WOUND, UNSPECIFIED FOOT, INITIAL ENCOUNTER: ICD-10-CM

## 2021-01-29 LAB
ALBUMIN SERPL ELPH-MCNC: 3.1 G/DL — LOW (ref 3.3–5)
ALP SERPL-CCNC: 89 U/L — SIGNIFICANT CHANGE UP (ref 40–120)
ALT FLD-CCNC: 31 U/L — SIGNIFICANT CHANGE UP (ref 12–78)
ANION GAP SERPL CALC-SCNC: 6 MMOL/L — SIGNIFICANT CHANGE UP (ref 5–17)
AST SERPL-CCNC: 32 U/L — SIGNIFICANT CHANGE UP (ref 15–37)
BASOPHILS # BLD AUTO: 0.09 K/UL — SIGNIFICANT CHANGE UP (ref 0–0.2)
BASOPHILS NFR BLD AUTO: 1.3 % — SIGNIFICANT CHANGE UP (ref 0–2)
BILIRUB SERPL-MCNC: 0.4 MG/DL — SIGNIFICANT CHANGE UP (ref 0.2–1.2)
BUN SERPL-MCNC: 14 MG/DL — SIGNIFICANT CHANGE UP (ref 7–23)
CALCIUM SERPL-MCNC: 8.6 MG/DL — SIGNIFICANT CHANGE UP (ref 8.5–10.1)
CHLORIDE SERPL-SCNC: 103 MMOL/L — SIGNIFICANT CHANGE UP (ref 96–108)
CO2 SERPL-SCNC: 30 MMOL/L — SIGNIFICANT CHANGE UP (ref 22–31)
CREAT SERPL-MCNC: 1.1 MG/DL — SIGNIFICANT CHANGE UP (ref 0.5–1.3)
EOSINOPHIL # BLD AUTO: 0.38 K/UL — SIGNIFICANT CHANGE UP (ref 0–0.5)
EOSINOPHIL NFR BLD AUTO: 5.4 % — SIGNIFICANT CHANGE UP (ref 0–6)
ERYTHROCYTE [SEDIMENTATION RATE] IN BLOOD: 38 MM/HR — HIGH (ref 0–20)
GLUCOSE SERPL-MCNC: 161 MG/DL — HIGH (ref 70–99)
HCT VFR BLD CALC: 39 % — SIGNIFICANT CHANGE UP (ref 34.5–45)
HGB BLD-MCNC: 12.5 G/DL — SIGNIFICANT CHANGE UP (ref 11.5–15.5)
IMM GRANULOCYTES NFR BLD AUTO: 0.6 % — SIGNIFICANT CHANGE UP (ref 0–1.5)
LYMPHOCYTES # BLD AUTO: 0.93 K/UL — LOW (ref 1–3.3)
LYMPHOCYTES # BLD AUTO: 13.2 % — SIGNIFICANT CHANGE UP (ref 13–44)
MCHC RBC-ENTMCNC: 29.6 PG — SIGNIFICANT CHANGE UP (ref 27–34)
MCHC RBC-ENTMCNC: 32.1 GM/DL — SIGNIFICANT CHANGE UP (ref 32–36)
MCV RBC AUTO: 92.2 FL — SIGNIFICANT CHANGE UP (ref 80–100)
MONOCYTES # BLD AUTO: 0.74 K/UL — SIGNIFICANT CHANGE UP (ref 0–0.9)
MONOCYTES NFR BLD AUTO: 10.5 % — SIGNIFICANT CHANGE UP (ref 2–14)
NEUTROPHILS # BLD AUTO: 4.85 K/UL — SIGNIFICANT CHANGE UP (ref 1.8–7.4)
NEUTROPHILS NFR BLD AUTO: 69 % — SIGNIFICANT CHANGE UP (ref 43–77)
NRBC # BLD: 0 /100 WBCS — SIGNIFICANT CHANGE UP (ref 0–0)
PLATELET # BLD AUTO: 332 K/UL — SIGNIFICANT CHANGE UP (ref 150–400)
POTASSIUM SERPL-MCNC: 3.7 MMOL/L — SIGNIFICANT CHANGE UP (ref 3.5–5.3)
POTASSIUM SERPL-SCNC: 3.7 MMOL/L — SIGNIFICANT CHANGE UP (ref 3.5–5.3)
PROT SERPL-MCNC: 8.1 G/DL — SIGNIFICANT CHANGE UP (ref 6–8.3)
RBC # BLD: 4.23 M/UL — SIGNIFICANT CHANGE UP (ref 3.8–5.2)
RBC # FLD: 14 % — SIGNIFICANT CHANGE UP (ref 10.3–14.5)
SODIUM SERPL-SCNC: 139 MMOL/L — SIGNIFICANT CHANGE UP (ref 135–145)
WBC # BLD: 7.03 K/UL — SIGNIFICANT CHANGE UP (ref 3.8–10.5)
WBC # FLD AUTO: 7.03 K/UL — SIGNIFICANT CHANGE UP (ref 3.8–10.5)

## 2021-01-29 PROCEDURE — 71046 X-RAY EXAM CHEST 2 VIEWS: CPT | Mod: 26

## 2021-01-29 PROCEDURE — U0005: CPT

## 2021-01-29 PROCEDURE — 71046 X-RAY EXAM CHEST 2 VIEWS: CPT

## 2021-01-29 PROCEDURE — 85652 RBC SED RATE AUTOMATED: CPT

## 2021-01-29 PROCEDURE — 36415 COLL VENOUS BLD VENIPUNCTURE: CPT

## 2021-01-29 PROCEDURE — 83036 HEMOGLOBIN GLYCOSYLATED A1C: CPT

## 2021-01-29 PROCEDURE — 99024 POSTOP FOLLOW-UP VISIT: CPT

## 2021-01-29 PROCEDURE — U0003: CPT

## 2021-01-29 PROCEDURE — 85025 COMPLETE CBC W/AUTO DIFF WBC: CPT

## 2021-01-29 PROCEDURE — 80053 COMPREHEN METABOLIC PANEL: CPT

## 2021-01-29 PROCEDURE — G0463: CPT

## 2021-01-29 PROCEDURE — 84134 ASSAY OF PREALBUMIN: CPT

## 2021-01-29 PROCEDURE — 86140 C-REACTIVE PROTEIN: CPT

## 2021-01-29 RX ORDER — POTASSIUM CHLORIDE 750 MG/1
10 TABLET, FILM COATED, EXTENDED RELEASE ORAL
Refills: 0 | Status: ACTIVE | COMMUNITY

## 2021-01-29 RX ORDER — INSULIN ASPART 100 [IU]/ML
INJECTION, SOLUTION INTRAVENOUS; SUBCUTANEOUS
Refills: 0 | Status: ACTIVE | COMMUNITY

## 2021-01-30 DIAGNOSIS — Z79.82 LONG TERM (CURRENT) USE OF ASPIRIN: ICD-10-CM

## 2021-01-30 DIAGNOSIS — E11.621 TYPE 2 DIABETES MELLITUS WITH FOOT ULCER: ICD-10-CM

## 2021-01-30 DIAGNOSIS — Z83.3 FAMILY HISTORY OF DIABETES MELLITUS: ICD-10-CM

## 2021-01-30 DIAGNOSIS — Z82.5 FAMILY HISTORY OF ASTHMA AND OTHER CHRONIC LOWER RESPIRATORY DISEASES: ICD-10-CM

## 2021-01-30 DIAGNOSIS — Z48.02 ENCOUNTER FOR REMOVAL OF SUTURES: ICD-10-CM

## 2021-01-30 DIAGNOSIS — Z88.0 ALLERGY STATUS TO PENICILLIN: ICD-10-CM

## 2021-01-30 DIAGNOSIS — Z20.822 CONTACT WITH AND (SUSPECTED) EXPOSURE TO COVID-19: ICD-10-CM

## 2021-01-30 DIAGNOSIS — Z98.890 OTHER SPECIFIED POSTPROCEDURAL STATES: ICD-10-CM

## 2021-01-30 DIAGNOSIS — L97.416 NON-PRESSURE CHRONIC ULCER OF RIGHT HEEL AND MIDFOOT WITH BONE INVOLVEMENT WITHOUT EVIDENCE OF NECROSIS: ICD-10-CM

## 2021-01-30 DIAGNOSIS — E11.40 TYPE 2 DIABETES MELLITUS WITH DIABETIC NEUROPATHY, UNSPECIFIED: ICD-10-CM

## 2021-01-30 DIAGNOSIS — Z79.899 OTHER LONG TERM (CURRENT) DRUG THERAPY: ICD-10-CM

## 2021-01-30 DIAGNOSIS — Z98.42 CATARACT EXTRACTION STATUS, LEFT EYE: ICD-10-CM

## 2021-01-30 DIAGNOSIS — Z98.41 CATARACT EXTRACTION STATUS, RIGHT EYE: ICD-10-CM

## 2021-01-30 DIAGNOSIS — Z79.4 LONG TERM (CURRENT) USE OF INSULIN: ICD-10-CM

## 2021-01-30 LAB
A1C WITH ESTIMATED AVERAGE GLUCOSE RESULT: 7.2 % — HIGH (ref 4–5.6)
CRP SERPL-MCNC: 0.66 MG/DL — HIGH (ref 0–0.4)
ESTIMATED AVERAGE GLUCOSE: 160 MG/DL — HIGH (ref 68–114)
PREALB SERPL-MCNC: 13 MG/DL — LOW (ref 20–40)
SARS-COV-2 RNA SPEC QL NAA+PROBE: SIGNIFICANT CHANGE UP

## 2021-02-03 ENCOUNTER — NON-APPOINTMENT (OUTPATIENT)
Age: 71
End: 2021-02-03

## 2021-02-03 RX ORDER — ALPRAZOLAM 0.5 MG/1
0.5 TABLET ORAL
Qty: 30 | Refills: 0 | Status: COMPLETED | COMMUNITY
Start: 2021-02-03 | End: 2021-03-05

## 2021-02-04 ENCOUNTER — OUTPATIENT (OUTPATIENT)
Dept: OUTPATIENT SERVICES | Facility: HOSPITAL | Age: 71
LOS: 1 days | Discharge: HOME | End: 2021-02-04
Payer: MEDICARE

## 2021-02-04 ENCOUNTER — APPOINTMENT (OUTPATIENT)
Dept: HYPERBARIC MEDICINE | Facility: HOSPITAL | Age: 71
End: 2021-02-04
Payer: MEDICARE

## 2021-02-04 VITALS
OXYGEN SATURATION: 99 % | DIASTOLIC BLOOD PRESSURE: 65 MMHG | HEART RATE: 72 BPM | RESPIRATION RATE: 18 BRPM | TEMPERATURE: 97.5 F | SYSTOLIC BLOOD PRESSURE: 136 MMHG

## 2021-02-04 VITALS
RESPIRATION RATE: 18 BRPM | TEMPERATURE: 97.2 F | DIASTOLIC BLOOD PRESSURE: 72 MMHG | OXYGEN SATURATION: 100 % | SYSTOLIC BLOOD PRESSURE: 145 MMHG | HEART RATE: 63 BPM

## 2021-02-04 DIAGNOSIS — M75.00 ADHESIVE CAPSULITIS OF UNSPECIFIED SHOULDER: Chronic | ICD-10-CM

## 2021-02-04 DIAGNOSIS — Z86.69 PERSONAL HISTORY OF OTHER DISEASES OF THE NERVOUS SYSTEM AND SENSE ORGANS: Chronic | ICD-10-CM

## 2021-02-04 DIAGNOSIS — Z79.4 LONG TERM (CURRENT) USE OF INSULIN: ICD-10-CM

## 2021-02-04 DIAGNOSIS — Z98.890 OTHER SPECIFIED POSTPROCEDURAL STATES: Chronic | ICD-10-CM

## 2021-02-04 DIAGNOSIS — E11.621 TYPE 2 DIABETES MELLITUS WITH FOOT ULCER: ICD-10-CM

## 2021-02-04 DIAGNOSIS — S02.91XA UNSPECIFIED FRACTURE OF SKULL, INITIAL ENCOUNTER FOR CLOSED FRACTURE: Chronic | ICD-10-CM

## 2021-02-04 DIAGNOSIS — L97.416 NON-PRESSURE CHRONIC ULCER OF RIGHT HEEL AND MIDFOOT WITH BONE INVOLVEMENT WITHOUT EVIDENCE OF NECROSIS: ICD-10-CM

## 2021-02-04 PROCEDURE — 99183 HYPERBARIC OXYGEN THERAPY: CPT

## 2021-02-04 PROCEDURE — G0277: CPT

## 2021-02-04 PROCEDURE — 82962 GLUCOSE BLOOD TEST: CPT

## 2021-02-05 ENCOUNTER — OUTPATIENT (OUTPATIENT)
Dept: OUTPATIENT SERVICES | Facility: HOSPITAL | Age: 71
LOS: 1 days | Discharge: HOME | End: 2021-02-05
Payer: MEDICARE

## 2021-02-05 ENCOUNTER — APPOINTMENT (OUTPATIENT)
Dept: HYPERBARIC MEDICINE | Facility: HOSPITAL | Age: 71
End: 2021-02-05
Payer: MEDICARE

## 2021-02-05 ENCOUNTER — APPOINTMENT (OUTPATIENT)
Dept: PODIATRY | Facility: HOSPITAL | Age: 71
End: 2021-02-05

## 2021-02-05 VITALS
HEART RATE: 62 BPM | OXYGEN SATURATION: 99 % | SYSTOLIC BLOOD PRESSURE: 130 MMHG | TEMPERATURE: 97.3 F | DIASTOLIC BLOOD PRESSURE: 59 MMHG | RESPIRATION RATE: 18 BRPM

## 2021-02-05 VITALS
RESPIRATION RATE: 18 BRPM | DIASTOLIC BLOOD PRESSURE: 56 MMHG | OXYGEN SATURATION: 95 % | HEART RATE: 72 BPM | SYSTOLIC BLOOD PRESSURE: 151 MMHG | TEMPERATURE: 97 F

## 2021-02-05 DIAGNOSIS — Z98.890 OTHER SPECIFIED POSTPROCEDURAL STATES: Chronic | ICD-10-CM

## 2021-02-05 DIAGNOSIS — Z86.69 PERSONAL HISTORY OF OTHER DISEASES OF THE NERVOUS SYSTEM AND SENSE ORGANS: Chronic | ICD-10-CM

## 2021-02-05 DIAGNOSIS — M75.00 ADHESIVE CAPSULITIS OF UNSPECIFIED SHOULDER: Chronic | ICD-10-CM

## 2021-02-05 DIAGNOSIS — E11.621 TYPE 2 DIABETES MELLITUS WITH FOOT ULCER: ICD-10-CM

## 2021-02-05 DIAGNOSIS — S02.91XA UNSPECIFIED FRACTURE OF SKULL, INITIAL ENCOUNTER FOR CLOSED FRACTURE: Chronic | ICD-10-CM

## 2021-02-05 LAB — SARS-COV-2 RNA SPEC QL NAA+PROBE: SIGNIFICANT CHANGE UP

## 2021-02-05 PROCEDURE — 99183 HYPERBARIC OXYGEN THERAPY: CPT

## 2021-02-05 PROCEDURE — U0005: CPT

## 2021-02-05 PROCEDURE — G0277: CPT

## 2021-02-05 PROCEDURE — 82962 GLUCOSE BLOOD TEST: CPT

## 2021-02-05 PROCEDURE — U0003: CPT

## 2021-02-06 ENCOUNTER — OUTPATIENT (OUTPATIENT)
Dept: OUTPATIENT SERVICES | Facility: HOSPITAL | Age: 71
LOS: 1 days | Discharge: HOME | End: 2021-02-06
Payer: MEDICARE

## 2021-02-06 ENCOUNTER — APPOINTMENT (OUTPATIENT)
Dept: HYPERBARIC MEDICINE | Facility: HOSPITAL | Age: 71
End: 2021-02-06
Payer: MEDICARE

## 2021-02-06 VITALS
TEMPERATURE: 96.9 F | HEART RATE: 61 BPM | OXYGEN SATURATION: 100 % | SYSTOLIC BLOOD PRESSURE: 152 MMHG | RESPIRATION RATE: 18 BRPM | DIASTOLIC BLOOD PRESSURE: 76 MMHG

## 2021-02-06 VITALS
SYSTOLIC BLOOD PRESSURE: 144 MMHG | OXYGEN SATURATION: 95 % | TEMPERATURE: 97.6 F | DIASTOLIC BLOOD PRESSURE: 62 MMHG | RESPIRATION RATE: 18 BRPM | HEART RATE: 68 BPM

## 2021-02-06 DIAGNOSIS — Z98.890 OTHER SPECIFIED POSTPROCEDURAL STATES: Chronic | ICD-10-CM

## 2021-02-06 DIAGNOSIS — S02.91XA UNSPECIFIED FRACTURE OF SKULL, INITIAL ENCOUNTER FOR CLOSED FRACTURE: Chronic | ICD-10-CM

## 2021-02-06 DIAGNOSIS — Z86.69 PERSONAL HISTORY OF OTHER DISEASES OF THE NERVOUS SYSTEM AND SENSE ORGANS: Chronic | ICD-10-CM

## 2021-02-06 DIAGNOSIS — E11.621 TYPE 2 DIABETES MELLITUS WITH FOOT ULCER: ICD-10-CM

## 2021-02-06 DIAGNOSIS — L97.416 NON-PRESSURE CHRONIC ULCER OF RIGHT HEEL AND MIDFOOT WITH BONE INVOLVEMENT WITHOUT EVIDENCE OF NECROSIS: ICD-10-CM

## 2021-02-06 DIAGNOSIS — S91.309A UNSPECIFIED OPEN WOUND, UNSPECIFIED FOOT, INITIAL ENCOUNTER: ICD-10-CM

## 2021-02-06 DIAGNOSIS — Z20.822 CONTACT WITH AND (SUSPECTED) EXPOSURE TO COVID-19: ICD-10-CM

## 2021-02-06 DIAGNOSIS — Z79.4 LONG TERM (CURRENT) USE OF INSULIN: ICD-10-CM

## 2021-02-06 DIAGNOSIS — M75.00 ADHESIVE CAPSULITIS OF UNSPECIFIED SHOULDER: Chronic | ICD-10-CM

## 2021-02-06 PROCEDURE — G0277: CPT

## 2021-02-06 PROCEDURE — 99183 HYPERBARIC OXYGEN THERAPY: CPT

## 2021-02-06 PROCEDURE — 82962 GLUCOSE BLOOD TEST: CPT

## 2021-02-06 NOTE — PROCEDURE
[Outpatient] : Outpatient [Wheelchair] : wheelchair [THIS CHAMBER HAS BEEN CLEANED / DISINFECTED] : This chamber has been cleaned / disinfected according to local and hospital policy and procedure prior to this treatment. [Patient demonstrated and verbalized proper technique for using air break mask] : Patient demonstrated and verbalized proper technique for using air break mask [Patient educated on the risks of SMOKING prior to HBOT with understanding] : Patient educated on the risks of SMOKING prior to HBOT with understanding [Patient educated on the risks of CONSUMING ALCOHOL prior to HBOT with understanding] : Patient educated on the risks of CONSUMING ALCOHOL prior to HBOT with understanding [100% Cotton] : 100% cotton [Empty all pockets] : empty all pockets [No hair oils, wigs, hairpieces, pins] : no hair oils, wigs, hairpieces, pins  [Pre tx medications] : pre tx medications  [No make-up, creams] : no make-up, creams  [No jewelry] : no jewelry  [No matches, cigarettes, lighters] : no matches, cigarettes, lighters  [Hearing aid removed] : hearing aid removed [Dentures removed] : dentures removed [Ground bracelet on pt's wrist] : ground bracelet on pt's wrist  [Contacts removed] : contacts removed  [Remove nail polish] : remove nail polish  [No reading material] : no reading material  [Bra, undergarments removed] : bra, undergarments removed  [No contraindicated dressings] : no contraindicated dressings [Ground Wire - VISUAL Verification - Intact/Free of Obstruction] : Ground Wire - VISUAL Verification - Intact/Free of Obstruction  [Ground Continuity - Verified < 1 ohm w/ Wrist Strap Channing] : Ground Continuity - Verified < 1 ohm w/ Wrist Strap Channing [Number: ___] : Number: [unfilled] [Diagnosis: ___] : Diagnosis: [unfilled] [____] : Post-Dive: Time - [unfilled] [___] : Post-Dive: Value - [unfilled] mg/dL [Clear all fields] : clear all fields [FreeTextEntry3] : 90 [] : No [FreeTextEntry5] : 3270 [FreeTextEntry7] : 100 [FreeTextEntry9] : 0549 [de-identified] : 108 MIN [de-identified] : 1141

## 2021-02-06 NOTE — ASSESSMENT
[Patient undergoing HBO treatment for __________] : Patient undergoing HBO treatment for [unfilled] [Patient descended without problem for 9 minutes] : Patient descended without problem for 9 minutes [No dizziness or thirst] :  No dizziness or thirst [No ear problems] : No ear problems [Vital signs stable] : Vital signs stable [Tolerating dive well] : Tolerating dive well [No Chest Pain, shortness of breath] : No Chest Pain, shortness of breath [Respiratory Rate Stable] : Respiratory Rate Stable [No chest pain, shortness of breath, or ear pain] :  No chest pain, shortness of breath, or ear pain  [Tolerated Ascent well] : Tolerated Ascent well [Vital Signs stable] : Vital Signs stable [No] : No [A physician was present throughout the entire HBOT] : A physician was present throughout the entire HBOT [Clinically Stable] : Clinically stable [Continue Treatment Plan] : Continue treatment plan [0] : 0 out of 10

## 2021-02-06 NOTE — ADDENDUM
[FreeTextEntry1] : PT DESCENDED TO 2.0 EULALIO @ 1.75 PSI/MIN WITHOUT INCIDENT IN CHAMBER # 2. PT'S RESTING AT TX DEPTH WITH LEGS ELEVATED. CHEST RISE AND FALL OBSERVED CHAMBER SIDE.\par PT ASCENDED FROM TX DEPTH WITHOUT INCIDENT. PT TOLERATED TX WELL.

## 2021-02-08 ENCOUNTER — APPOINTMENT (OUTPATIENT)
Dept: HYPERBARIC MEDICINE | Facility: HOSPITAL | Age: 71
End: 2021-02-08
Payer: MEDICARE

## 2021-02-08 ENCOUNTER — OUTPATIENT (OUTPATIENT)
Dept: OUTPATIENT SERVICES | Facility: HOSPITAL | Age: 71
LOS: 1 days | Discharge: HOME | End: 2021-02-08
Payer: MEDICARE

## 2021-02-08 VITALS
HEART RATE: 67 BPM | OXYGEN SATURATION: 98 % | DIASTOLIC BLOOD PRESSURE: 61 MMHG | TEMPERATURE: 97.1 F | RESPIRATION RATE: 20 BRPM | SYSTOLIC BLOOD PRESSURE: 135 MMHG

## 2021-02-08 VITALS
HEART RATE: 75 BPM | DIASTOLIC BLOOD PRESSURE: 68 MMHG | OXYGEN SATURATION: 95 % | RESPIRATION RATE: 20 BRPM | SYSTOLIC BLOOD PRESSURE: 156 MMHG | TEMPERATURE: 98.1 F

## 2021-02-08 DIAGNOSIS — E11.621 TYPE 2 DIABETES MELLITUS WITH FOOT ULCER: ICD-10-CM

## 2021-02-08 DIAGNOSIS — L97.416 NON-PRESSURE CHRONIC ULCER OF RIGHT HEEL AND MIDFOOT WITH BONE INVOLVEMENT WITHOUT EVIDENCE OF NECROSIS: ICD-10-CM

## 2021-02-08 DIAGNOSIS — Z98.890 OTHER SPECIFIED POSTPROCEDURAL STATES: Chronic | ICD-10-CM

## 2021-02-08 DIAGNOSIS — Z86.69 PERSONAL HISTORY OF OTHER DISEASES OF THE NERVOUS SYSTEM AND SENSE ORGANS: Chronic | ICD-10-CM

## 2021-02-08 DIAGNOSIS — S02.91XA UNSPECIFIED FRACTURE OF SKULL, INITIAL ENCOUNTER FOR CLOSED FRACTURE: Chronic | ICD-10-CM

## 2021-02-08 DIAGNOSIS — Z79.4 LONG TERM (CURRENT) USE OF INSULIN: ICD-10-CM

## 2021-02-08 DIAGNOSIS — M75.00 ADHESIVE CAPSULITIS OF UNSPECIFIED SHOULDER: Chronic | ICD-10-CM

## 2021-02-08 PROCEDURE — 99183 HYPERBARIC OXYGEN THERAPY: CPT

## 2021-02-08 PROCEDURE — 82962 GLUCOSE BLOOD TEST: CPT

## 2021-02-08 PROCEDURE — G0277: CPT

## 2021-02-08 NOTE — PROCEDURE
[Outpatient] : Outpatient [Wheelchair] : wheelchair [THIS CHAMBER HAS BEEN CLEANED / DISINFECTED] : This chamber has been cleaned / disinfected according to local and hospital policy and procedure prior to this treatment. [Patient demonstrated and verbalized proper technique for using air break mask] : Patient demonstrated and verbalized proper technique for using air break mask [Patient educated on the risks of SMOKING prior to HBOT with understanding] : Patient educated on the risks of SMOKING prior to HBOT with understanding [Patient educated on the risks of CONSUMING ALCOHOL prior to HBOT with understanding] : Patient educated on the risks of CONSUMING ALCOHOL prior to HBOT with understanding [100% Cotton] : 100% cotton [Empty all pockets] : empty all pockets [No hair oils, wigs, hairpieces, pins] : no hair oils, wigs, hairpieces, pins  [Pre tx medications] : pre tx medications  [No make-up, creams] : no make-up, creams  [No jewelry] : no jewelry  [No matches, cigarettes, lighters] : no matches, cigarettes, lighters  [Hearing aid removed] : hearing aid removed [Dentures removed] : dentures removed [Ground bracelet on pt's wrist] : ground bracelet on pt's wrist  [Contacts removed] : contacts removed  [Remove nail polish] : remove nail polish  [No reading material] : no reading material  [Bra, undergarments removed] : bra, undergarments removed  [No contraindicated dressings] : no contraindicated dressings [Ground Wire - VISUAL Verification - Intact/Free of Obstruction] : Ground Wire - VISUAL Verification - Intact/Free of Obstruction  [Ground Continuity - Verified < 1 ohm w/ Wrist Strap Channing] : Ground Continuity - Verified < 1 ohm w/ Wrist Strap Channing [Number: ___] : Number: [unfilled] [Diagnosis: ___] : Diagnosis: [unfilled] [____] : Post-Dive: Time - [unfilled] [___] : Post-Dive: Value - [unfilled] mg/dL [Clear all fields] : clear all fields [] : No [FreeTextEntry3] : 90 [FreeTextEntry5] : 0311 [FreeB[facility] – PlanotEntry7] : 6300 [FreeFoundation Surgical Hospital of El PasotEntry9] : 5975 [de-identified] : 1983 [de-identified] : 108 minutes

## 2021-02-08 NOTE — ADDENDUM
[FreeTextEntry1] : PT'S PICC LINE MEASURED PRIOR TO DESCENT\par COVID-19 PCR SWAB OBTAINED PRIOR TO DESCENT.\par PT DESCENDED TO 2.0 EULALIO @ 1.75 PSI/MIN WITHOUT INCIDENT IN CHAMBER # 2. PT'S RESTING AT TX DEPTH WITH LEGS ELEVATED.CHEST RISE AND FALL OBSERVED CHAMBER SIDE.\par PT ASCENDED FROM TX DEPTH WITHOUT INCIDENT\par PT'S PICC LINE MEASURED POST HBOT \par PT RECEIVED WC BY RN POST HBOT\par PT TOLERATED TX WELL

## 2021-02-09 ENCOUNTER — OUTPATIENT (OUTPATIENT)
Dept: OUTPATIENT SERVICES | Facility: HOSPITAL | Age: 71
LOS: 1 days | Discharge: HOME | End: 2021-02-09
Payer: MEDICARE

## 2021-02-09 ENCOUNTER — APPOINTMENT (OUTPATIENT)
Dept: HYPERBARIC MEDICINE | Facility: HOSPITAL | Age: 71
End: 2021-02-09
Payer: MEDICARE

## 2021-02-09 VITALS
SYSTOLIC BLOOD PRESSURE: 151 MMHG | TEMPERATURE: 97.4 F | OXYGEN SATURATION: 99 % | HEART RATE: 64 BPM | DIASTOLIC BLOOD PRESSURE: 90 MMHG | RESPIRATION RATE: 20 BRPM

## 2021-02-09 VITALS
TEMPERATURE: 98.2 F | RESPIRATION RATE: 20 BRPM | DIASTOLIC BLOOD PRESSURE: 73 MMHG | OXYGEN SATURATION: 95 % | SYSTOLIC BLOOD PRESSURE: 151 MMHG | HEART RATE: 79 BPM

## 2021-02-09 DIAGNOSIS — Z79.4 LONG TERM (CURRENT) USE OF INSULIN: ICD-10-CM

## 2021-02-09 DIAGNOSIS — M75.00 ADHESIVE CAPSULITIS OF UNSPECIFIED SHOULDER: Chronic | ICD-10-CM

## 2021-02-09 DIAGNOSIS — E11.621 TYPE 2 DIABETES MELLITUS WITH FOOT ULCER: ICD-10-CM

## 2021-02-09 DIAGNOSIS — Z98.890 OTHER SPECIFIED POSTPROCEDURAL STATES: Chronic | ICD-10-CM

## 2021-02-09 DIAGNOSIS — Z86.69 PERSONAL HISTORY OF OTHER DISEASES OF THE NERVOUS SYSTEM AND SENSE ORGANS: Chronic | ICD-10-CM

## 2021-02-09 DIAGNOSIS — L97.416 NON-PRESSURE CHRONIC ULCER OF RIGHT HEEL AND MIDFOOT WITH BONE INVOLVEMENT WITHOUT EVIDENCE OF NECROSIS: ICD-10-CM

## 2021-02-09 DIAGNOSIS — S02.91XA UNSPECIFIED FRACTURE OF SKULL, INITIAL ENCOUNTER FOR CLOSED FRACTURE: Chronic | ICD-10-CM

## 2021-02-09 PROCEDURE — 82962 GLUCOSE BLOOD TEST: CPT

## 2021-02-09 PROCEDURE — 99183 HYPERBARIC OXYGEN THERAPY: CPT

## 2021-02-09 PROCEDURE — G0277: CPT

## 2021-02-09 NOTE — ADDENDUM
[FreeTextEntry1] : PT RECEIVED WC BY RN PRE-HBOT \par PTS PICC LINE MEASURED PRE-HBOT BY RN \par PT DESCENDED TO 2.0 EULALIO @ 1.75 PSI/MIN WITHOUT INCIDENT IN CHAMBER #1\par PT RESTING AT TX DEPTH WITH VISIBLE CHEST RISE AND FALL OBSERVED CHAMBERSIDE \par PT ASCENDED FROM TX DEPTH WITHOUT INCIDENT \par PTS PICC LINE MEASURED POST HBOT BY RN \par PT TOLERATED TX WELL

## 2021-02-09 NOTE — PROCEDURE
[Outpatient] : Outpatient [Ambulatory] : Patient is ambulatory. [Cane] : cane [THIS CHAMBER HAS BEEN CLEANED / DISINFECTED] : This chamber has been cleaned / disinfected according to local and hospital policy and procedure prior to this treatment. [Patient demonstrated and verbalized proper technique for using air break mask] : Patient demonstrated and verbalized proper technique for using air break mask [Patient educated on the risks of SMOKING prior to HBOT with understanding] : Patient educated on the risks of SMOKING prior to HBOT with understanding [Patient educated on the risks of CONSUMING ALCOHOL prior to HBOT with understanding] : Patient educated on the risks of CONSUMING ALCOHOL prior to HBOT with understanding [100% Cotton] : 100% cotton [Empty all pockets] : empty all pockets [No hair oils, wigs, hairpieces, pins] : no hair oils, wigs, hairpieces, pins  [Pre tx medications] : pre tx medications  [No make-up, creams] : no make-up, creams  [No jewelry] : no jewelry  [No matches, cigarettes, lighters] : no matches, cigarettes, lighters  [Hearing aid removed] : hearing aid removed [Dentures removed] : dentures removed [Ground bracelet on pt's wrist] : ground bracelet on pt's wrist  [Contacts removed] : contacts removed  [Remove nail polish] : remove nail polish  [No reading material] : no reading material  [Bra, undergarments removed] : bra, undergarments removed  [No contraindicated dressings] : no contraindicated dressings [Ground Wire - VISUAL Verification - Intact/Free of Obstruction] : Ground Wire - VISUAL Verification - Intact/Free of Obstruction  [Ground Continuity - Verified < 1 ohm w/ Wrist Strap Channing] : Ground Continuity - Verified < 1 ohm w/ Wrist Strap Channing [Number: ___] : Number: [unfilled] [Diagnosis: ___] : Diagnosis: [unfilled] [____] : Post-Dive: Time - [unfilled] [___] : Post-Dive: Value - [unfilled] mg/dL [Clear all fields] : clear all fields [] : No [FreeTextEntry3] : 90 [FreeTextEntry5] : 6511 [FreeTextEntry7] : 7184 [FreeTextEntry9] : 8552 [de-identified] : 8370 [de-identified] : 108 minutes

## 2021-02-10 ENCOUNTER — APPOINTMENT (OUTPATIENT)
Dept: HYPERBARIC MEDICINE | Facility: HOSPITAL | Age: 71
End: 2021-02-10

## 2021-02-10 NOTE — ADDENDUM
[FreeTextEntry1] : Pt descended to 2.0 EULALIO @ 1.75 PSI/MIN without incident in chamber #1.\par Pt resting comfortably @ depth, with equal chest rise observed throughout tx.\par Pt ascended from 2.0 EULALIO @ 1.75 PSI/MIN without incident in chamber #1.\par Pt tolerated treatment well.\par

## 2021-02-10 NOTE — PROCEDURE
[Outpatient] : Outpatient [Ambulatory] : Patient is ambulatory. [Cane] : cane [THIS CHAMBER HAS BEEN CLEANED / DISINFECTED] : This chamber has been cleaned / disinfected according to local and hospital policy and procedure prior to this treatment. [Patient demonstrated and verbalized proper technique for using air break mask] : Patient demonstrated and verbalized proper technique for using air break mask [Patient educated on the risks of SMOKING prior to HBOT with understanding] : Patient educated on the risks of SMOKING prior to HBOT with understanding [Patient educated on the risks of CONSUMING ALCOHOL prior to HBOT with understanding] : Patient educated on the risks of CONSUMING ALCOHOL prior to HBOT with understanding [100% Cotton] : 100% cotton [Empty all pockets] : empty all pockets [No hair oils, wigs, hairpieces, pins] : no hair oils, wigs, hairpieces, pins  [Pre tx medications] : pre tx medications  [No make-up, creams] : no make-up, creams  [No jewelry] : no jewelry  [No matches, cigarettes, lighters] : no matches, cigarettes, lighters  [Hearing aid removed] : hearing aid removed [Dentures removed] : dentures removed [Ground bracelet on pt's wrist] : ground bracelet on pt's wrist  [Contacts removed] : contacts removed  [Remove nail polish] : remove nail polish  [No reading material] : no reading material  [Bra, undergarments removed] : bra, undergarments removed  [No contraindicated dressings] : no contraindicated dressings [Ground Wire - VISUAL Verification - Intact/Free of Obstruction] : Ground Wire - VISUAL Verification - Intact/Free of Obstruction  [Ground Continuity - Verified < 1 ohm w/ Wrist Strap Channing] : Ground Continuity - Verified < 1 ohm w/ Wrist Strap Channing [Number: ___] : Number: [unfilled] [Diagnosis: ___] : Diagnosis: [unfilled] [____] : Post-Dive: Time - [unfilled] [___] : Post-Dive: Value - [unfilled] mg/dL [Clear all fields] : clear all fields [] : No [FreeTextEntry3] : 90 [FreeTextEntry5] : 8107 [FreeTextEntry7] : 3062 [FreeTextEntry9] : 2148 [de-identified] : 2477 [de-identified] : 108mins

## 2021-02-11 ENCOUNTER — OUTPATIENT (OUTPATIENT)
Dept: OUTPATIENT SERVICES | Facility: HOSPITAL | Age: 71
LOS: 1 days | Discharge: HOME | End: 2021-02-11
Payer: MEDICARE

## 2021-02-11 ENCOUNTER — APPOINTMENT (OUTPATIENT)
Dept: HYPERBARIC MEDICINE | Facility: HOSPITAL | Age: 71
End: 2021-02-11
Payer: MEDICARE

## 2021-02-11 VITALS
HEART RATE: 68 BPM | DIASTOLIC BLOOD PRESSURE: 54 MMHG | OXYGEN SATURATION: 99 % | SYSTOLIC BLOOD PRESSURE: 137 MMHG | RESPIRATION RATE: 18 BRPM | TEMPERATURE: 97.2 F

## 2021-02-11 VITALS
DIASTOLIC BLOOD PRESSURE: 69 MMHG | HEART RATE: 69 BPM | RESPIRATION RATE: 18 BRPM | SYSTOLIC BLOOD PRESSURE: 128 MMHG | TEMPERATURE: 97.3 F | OXYGEN SATURATION: 97 %

## 2021-02-11 DIAGNOSIS — L97.416 NON-PRESSURE CHRONIC ULCER OF RIGHT HEEL AND MIDFOOT WITH BONE INVOLVEMENT WITHOUT EVIDENCE OF NECROSIS: ICD-10-CM

## 2021-02-11 DIAGNOSIS — Z86.69 PERSONAL HISTORY OF OTHER DISEASES OF THE NERVOUS SYSTEM AND SENSE ORGANS: Chronic | ICD-10-CM

## 2021-02-11 DIAGNOSIS — E11.621 TYPE 2 DIABETES MELLITUS WITH FOOT ULCER: ICD-10-CM

## 2021-02-11 DIAGNOSIS — Z79.4 LONG TERM (CURRENT) USE OF INSULIN: ICD-10-CM

## 2021-02-11 DIAGNOSIS — S02.91XA UNSPECIFIED FRACTURE OF SKULL, INITIAL ENCOUNTER FOR CLOSED FRACTURE: Chronic | ICD-10-CM

## 2021-02-11 DIAGNOSIS — Z98.890 OTHER SPECIFIED POSTPROCEDURAL STATES: Chronic | ICD-10-CM

## 2021-02-11 DIAGNOSIS — M75.00 ADHESIVE CAPSULITIS OF UNSPECIFIED SHOULDER: Chronic | ICD-10-CM

## 2021-02-11 PROCEDURE — G0277: CPT

## 2021-02-11 PROCEDURE — 99183 HYPERBARIC OXYGEN THERAPY: CPT

## 2021-02-11 PROCEDURE — 82962 GLUCOSE BLOOD TEST: CPT

## 2021-02-11 NOTE — PROCEDURE
[Outpatient] : Outpatient [Wheelchair] : wheelchair [THIS CHAMBER HAS BEEN CLEANED / DISINFECTED] : This chamber has been cleaned / disinfected according to local and hospital policy and procedure prior to this treatment. [Patient demonstrated and verbalized proper technique for using air break mask] : Patient demonstrated and verbalized proper technique for using air break mask [Patient educated on the risks of SMOKING prior to HBOT with understanding] : Patient educated on the risks of SMOKING prior to HBOT with understanding [Patient educated on the risks of CONSUMING ALCOHOL prior to HBOT with understanding] : Patient educated on the risks of CONSUMING ALCOHOL prior to HBOT with understanding [100% Cotton] : 100% cotton [Empty all pockets] : empty all pockets [No hair oils, wigs, hairpieces, pins] : no hair oils, wigs, hairpieces, pins  [Pre tx medications] : pre tx medications  [No make-up, creams] : no make-up, creams  [No jewelry] : no jewelry  [No matches, cigarettes, lighters] : no matches, cigarettes, lighters  [Hearing aid removed] : hearing aid removed [Dentures removed] : dentures removed [Ground bracelet on pt's wrist] : ground bracelet on pt's wrist  [Contacts removed] : contacts removed  [Remove nail polish] : remove nail polish  [No reading material] : no reading material  [Bra, undergarments removed] : bra, undergarments removed  [No contraindicated dressings] : no contraindicated dressings [Ground Wire - VISUAL Verification - Intact/Free of Obstruction] : Ground Wire - VISUAL Verification - Intact/Free of Obstruction  [Ground Continuity - Verified < 1 ohm w/ Wrist Strap Channing] : Ground Continuity - Verified < 1 ohm w/ Wrist Strap Channing [Number: ___] : Number: [unfilled] [Diagnosis: ___] : Diagnosis: [unfilled] [____] : Post-Dive: Time - [unfilled] [___] : Post-Dive: Value - [unfilled] mg/dL [Clear all fields] : clear all fields [] : No [FreeTextEntry3] : 90 [FreeTextEntry5] : 0442 [FreeTextEntry7] : 7268 [FreeTextEntry9] : 8489 [de-identified] : 6077 [de-identified] : 108 MIN

## 2021-02-11 NOTE — ADDENDUM
[FreeTextEntry1] : PT'S PICC LINE MEASURED PRIOR TO DESCENT. \par PT DESCENDED TO 2.0 EULALIO @ 1.75 PSI/MIN WITHOUT INCIDENT IN CHAMBER # 4. PT'S RESTING AT TX DEPTH WITH WOUND OFFLOADED/LEGS ELEVATED. CHEST RISE AND FALL OBSERVED CHAMBER SIDE.\par PT ASCENDED FROM 2.0 EULALIO @ 1.75 PSI/MIN WITHOUT INCIDENT. PT TOLERATED TX WELL.\par PT RECEIVED DRESSING CHANGE POST HBOT. \par PT'S PICC LINE MEASURED POST HBOT.\par \par *** PT MADE AWARE OF ASSESSMENT TOMORROW (2/12/21) WITH APPROPRIATE ARRIVAL TIME ***

## 2021-02-12 ENCOUNTER — OUTPATIENT (OUTPATIENT)
Dept: OUTPATIENT SERVICES | Facility: HOSPITAL | Age: 71
LOS: 1 days | Discharge: HOME | End: 2021-02-12
Payer: MEDICARE

## 2021-02-12 ENCOUNTER — APPOINTMENT (OUTPATIENT)
Dept: HYPERBARIC MEDICINE | Facility: HOSPITAL | Age: 71
End: 2021-02-12
Payer: MEDICARE

## 2021-02-12 VITALS
TEMPERATURE: 97.3 F | SYSTOLIC BLOOD PRESSURE: 125 MMHG | WEIGHT: 293 LBS | HEIGHT: 71 IN | DIASTOLIC BLOOD PRESSURE: 59 MMHG | RESPIRATION RATE: 18 BRPM | OXYGEN SATURATION: 95 % | BODY MASS INDEX: 41.02 KG/M2 | HEART RATE: 75 BPM

## 2021-02-12 DIAGNOSIS — Z86.69 PERSONAL HISTORY OF OTHER DISEASES OF THE NERVOUS SYSTEM AND SENSE ORGANS: Chronic | ICD-10-CM

## 2021-02-12 DIAGNOSIS — M75.00 ADHESIVE CAPSULITIS OF UNSPECIFIED SHOULDER: Chronic | ICD-10-CM

## 2021-02-12 DIAGNOSIS — S02.91XA UNSPECIFIED FRACTURE OF SKULL, INITIAL ENCOUNTER FOR CLOSED FRACTURE: Chronic | ICD-10-CM

## 2021-02-12 DIAGNOSIS — Z98.890 OTHER SPECIFIED POSTPROCEDURAL STATES: Chronic | ICD-10-CM

## 2021-02-12 DIAGNOSIS — E11.621 TYPE 2 DIABETES MELLITUS WITH FOOT ULCER: ICD-10-CM

## 2021-02-12 LAB — SARS-COV-2 RNA SPEC QL NAA+PROBE: SIGNIFICANT CHANGE UP

## 2021-02-12 PROCEDURE — 99024 POSTOP FOLLOW-UP VISIT: CPT

## 2021-02-12 PROCEDURE — U0005: CPT

## 2021-02-12 PROCEDURE — G0463: CPT

## 2021-02-12 PROCEDURE — U0003: CPT

## 2021-02-13 ENCOUNTER — APPOINTMENT (OUTPATIENT)
Dept: HYPERBARIC MEDICINE | Facility: HOSPITAL | Age: 71
End: 2021-02-13
Payer: MEDICARE

## 2021-02-13 ENCOUNTER — OUTPATIENT (OUTPATIENT)
Dept: OUTPATIENT SERVICES | Facility: HOSPITAL | Age: 71
LOS: 1 days | Discharge: HOME | End: 2021-02-13
Payer: MEDICARE

## 2021-02-13 VITALS
OXYGEN SATURATION: 99 % | RESPIRATION RATE: 20 BRPM | SYSTOLIC BLOOD PRESSURE: 137 MMHG | TEMPERATURE: 96.9 F | HEART RATE: 65 BPM | DIASTOLIC BLOOD PRESSURE: 55 MMHG

## 2021-02-13 VITALS
HEART RATE: 70 BPM | DIASTOLIC BLOOD PRESSURE: 59 MMHG | SYSTOLIC BLOOD PRESSURE: 142 MMHG | TEMPERATURE: 97.5 F | RESPIRATION RATE: 18 BRPM | OXYGEN SATURATION: 95 %

## 2021-02-13 DIAGNOSIS — Z79.4 LONG TERM (CURRENT) USE OF INSULIN: ICD-10-CM

## 2021-02-13 DIAGNOSIS — L97.416 NON-PRESSURE CHRONIC ULCER OF RIGHT HEEL AND MIDFOOT WITH BONE INVOLVEMENT WITHOUT EVIDENCE OF NECROSIS: ICD-10-CM

## 2021-02-13 DIAGNOSIS — Z20.822 CONTACT WITH AND (SUSPECTED) EXPOSURE TO COVID-19: ICD-10-CM

## 2021-02-13 DIAGNOSIS — Z88.0 ALLERGY STATUS TO PENICILLIN: ICD-10-CM

## 2021-02-13 DIAGNOSIS — E66.01 MORBID (SEVERE) OBESITY DUE TO EXCESS CALORIES: ICD-10-CM

## 2021-02-13 DIAGNOSIS — S02.91XA UNSPECIFIED FRACTURE OF SKULL, INITIAL ENCOUNTER FOR CLOSED FRACTURE: Chronic | ICD-10-CM

## 2021-02-13 DIAGNOSIS — Z79.899 OTHER LONG TERM (CURRENT) DRUG THERAPY: ICD-10-CM

## 2021-02-13 DIAGNOSIS — E11.621 TYPE 2 DIABETES MELLITUS WITH FOOT ULCER: ICD-10-CM

## 2021-02-13 DIAGNOSIS — M75.00 ADHESIVE CAPSULITIS OF UNSPECIFIED SHOULDER: Chronic | ICD-10-CM

## 2021-02-13 DIAGNOSIS — Z98.41 CATARACT EXTRACTION STATUS, RIGHT EYE: ICD-10-CM

## 2021-02-13 DIAGNOSIS — Z98.42 CATARACT EXTRACTION STATUS, LEFT EYE: ICD-10-CM

## 2021-02-13 DIAGNOSIS — E11.40 TYPE 2 DIABETES MELLITUS WITH DIABETIC NEUROPATHY, UNSPECIFIED: ICD-10-CM

## 2021-02-13 DIAGNOSIS — Z86.69 PERSONAL HISTORY OF OTHER DISEASES OF THE NERVOUS SYSTEM AND SENSE ORGANS: Chronic | ICD-10-CM

## 2021-02-13 DIAGNOSIS — Z98.890 OTHER SPECIFIED POSTPROCEDURAL STATES: Chronic | ICD-10-CM

## 2021-02-13 DIAGNOSIS — Z83.3 FAMILY HISTORY OF DIABETES MELLITUS: ICD-10-CM

## 2021-02-13 DIAGNOSIS — Z82.5 FAMILY HISTORY OF ASTHMA AND OTHER CHRONIC LOWER RESPIRATORY DISEASES: ICD-10-CM

## 2021-02-13 DIAGNOSIS — Z79.82 LONG TERM (CURRENT) USE OF ASPIRIN: ICD-10-CM

## 2021-02-13 DIAGNOSIS — Z98.890 OTHER SPECIFIED POSTPROCEDURAL STATES: ICD-10-CM

## 2021-02-13 PROCEDURE — 82962 GLUCOSE BLOOD TEST: CPT

## 2021-02-13 PROCEDURE — G0277: CPT

## 2021-02-13 PROCEDURE — 99183 HYPERBARIC OXYGEN THERAPY: CPT

## 2021-02-13 NOTE — ADDENDUM
[FreeTextEntry1] : PT'S PICC LINE MEASURED PRIOR TO DESCENT.\par PT DESCENDED TO 2.0 EULALIO @ 1.75 PSI/MIN WITHOUT INCIDENT IN CHAMBER # 4 .PT'S RESTING AT TX DEPTH WITH WOUND OFFLOADED/ LEGS ELEVATED. CHEST RISE AND FALL OBSERVED CHAMBER SIDE.\par PT ASCENDED FROM TX DEPTH WITHOUT INCIDENT.\par PT'S PICC LINE MEASURED POST HBOT.\par PT SENT HOME.

## 2021-02-13 NOTE — PROCEDURE
[Outpatient] : Outpatient [Wheelchair] : wheelchair [THIS CHAMBER HAS BEEN CLEANED / DISINFECTED] : This chamber has been cleaned / disinfected according to local and hospital policy and procedure prior to this treatment. [Patient demonstrated and verbalized proper technique for using air break mask] : Patient demonstrated and verbalized proper technique for using air break mask [Patient educated on the risks of SMOKING prior to HBOT with understanding] : Patient educated on the risks of SMOKING prior to HBOT with understanding [Patient educated on the risks of CONSUMING ALCOHOL prior to HBOT with understanding] : Patient educated on the risks of CONSUMING ALCOHOL prior to HBOT with understanding [100% Cotton] : 100% cotton [Empty all pockets] : empty all pockets [No hair oils, wigs, hairpieces, pins] : no hair oils, wigs, hairpieces, pins  [Pre tx medications] : pre tx medications  [No make-up, creams] : no make-up, creams  [No jewelry] : no jewelry  [No matches, cigarettes, lighters] : no matches, cigarettes, lighters  [Hearing aid removed] : hearing aid removed [Dentures removed] : dentures removed [Ground bracelet on pt's wrist] : ground bracelet on pt's wrist  [Contacts removed] : contacts removed  [Remove nail polish] : remove nail polish  [No reading material] : no reading material  [Bra, undergarments removed] : bra, undergarments removed  [No contraindicated dressings] : no contraindicated dressings [Ground Continuity - Verified < 1 ohm w/ Wrist Strap Channing] : Ground Continuity - Verified < 1 ohm w/ Wrist Strap Channing [Ground Wire - VISUAL Verification - Intact/Free of Obstruction] : Ground Wire - VISUAL Verification - Intact/Free of Obstruction  [Number: ___] : Number: [unfilled] [Diagnosis: ___] : Diagnosis: [unfilled] [____] : Post-Dive: Time - [unfilled] [___] : Post-Dive: Value - [unfilled] mg/dL [Clear all fields] : clear all fields [] : No [FreeTextEntry3] : 90 [FreeTextEntry5] : 4748 [FreeTextEntry7] : 1004 [FreeTextEntry9] : 8105 [de-identified] : 108 MIN [de-identified] : 5865

## 2021-02-15 ENCOUNTER — OUTPATIENT (OUTPATIENT)
Dept: OUTPATIENT SERVICES | Facility: HOSPITAL | Age: 71
LOS: 1 days | Discharge: HOME | End: 2021-02-15
Payer: MEDICARE

## 2021-02-15 ENCOUNTER — APPOINTMENT (OUTPATIENT)
Dept: HYPERBARIC MEDICINE | Facility: HOSPITAL | Age: 71
End: 2021-02-15
Payer: MEDICARE

## 2021-02-15 VITALS
TEMPERATURE: 96.9 F | OXYGEN SATURATION: 96 % | SYSTOLIC BLOOD PRESSURE: 117 MMHG | HEART RATE: 71 BPM | DIASTOLIC BLOOD PRESSURE: 57 MMHG | RESPIRATION RATE: 18 BRPM

## 2021-02-15 VITALS
SYSTOLIC BLOOD PRESSURE: 152 MMHG | HEART RATE: 66 BPM | TEMPERATURE: 97.3 F | DIASTOLIC BLOOD PRESSURE: 55 MMHG | RESPIRATION RATE: 20 BRPM | OXYGEN SATURATION: 100 %

## 2021-02-15 DIAGNOSIS — Z86.69 PERSONAL HISTORY OF OTHER DISEASES OF THE NERVOUS SYSTEM AND SENSE ORGANS: Chronic | ICD-10-CM

## 2021-02-15 DIAGNOSIS — M75.00 ADHESIVE CAPSULITIS OF UNSPECIFIED SHOULDER: Chronic | ICD-10-CM

## 2021-02-15 DIAGNOSIS — Z98.890 OTHER SPECIFIED POSTPROCEDURAL STATES: Chronic | ICD-10-CM

## 2021-02-15 DIAGNOSIS — S02.91XA UNSPECIFIED FRACTURE OF SKULL, INITIAL ENCOUNTER FOR CLOSED FRACTURE: Chronic | ICD-10-CM

## 2021-02-15 DIAGNOSIS — E11.621 TYPE 2 DIABETES MELLITUS WITH FOOT ULCER: ICD-10-CM

## 2021-02-15 PROCEDURE — G0277: CPT

## 2021-02-15 PROCEDURE — 82962 GLUCOSE BLOOD TEST: CPT

## 2021-02-15 PROCEDURE — 99183 HYPERBARIC OXYGEN THERAPY: CPT

## 2021-02-16 ENCOUNTER — OUTPATIENT (OUTPATIENT)
Dept: OUTPATIENT SERVICES | Facility: HOSPITAL | Age: 71
LOS: 1 days | Discharge: HOME | End: 2021-02-16
Payer: MEDICARE

## 2021-02-16 ENCOUNTER — APPOINTMENT (OUTPATIENT)
Dept: HYPERBARIC MEDICINE | Facility: HOSPITAL | Age: 71
End: 2021-02-16
Payer: MEDICARE

## 2021-02-16 VITALS
HEART RATE: 76 BPM | DIASTOLIC BLOOD PRESSURE: 57 MMHG | RESPIRATION RATE: 20 BRPM | SYSTOLIC BLOOD PRESSURE: 129 MMHG | TEMPERATURE: 97.4 F | OXYGEN SATURATION: 95 %

## 2021-02-16 VITALS
DIASTOLIC BLOOD PRESSURE: 57 MMHG | HEART RATE: 63 BPM | TEMPERATURE: 98.3 F | OXYGEN SATURATION: 100 % | RESPIRATION RATE: 20 BRPM | SYSTOLIC BLOOD PRESSURE: 113 MMHG

## 2021-02-16 DIAGNOSIS — Z79.4 LONG TERM (CURRENT) USE OF INSULIN: ICD-10-CM

## 2021-02-16 DIAGNOSIS — S02.91XA UNSPECIFIED FRACTURE OF SKULL, INITIAL ENCOUNTER FOR CLOSED FRACTURE: Chronic | ICD-10-CM

## 2021-02-16 DIAGNOSIS — M75.00 ADHESIVE CAPSULITIS OF UNSPECIFIED SHOULDER: Chronic | ICD-10-CM

## 2021-02-16 DIAGNOSIS — E11.621 TYPE 2 DIABETES MELLITUS WITH FOOT ULCER: ICD-10-CM

## 2021-02-16 DIAGNOSIS — L97.416 NON-PRESSURE CHRONIC ULCER OF RIGHT HEEL AND MIDFOOT WITH BONE INVOLVEMENT WITHOUT EVIDENCE OF NECROSIS: ICD-10-CM

## 2021-02-16 DIAGNOSIS — Z98.890 OTHER SPECIFIED POSTPROCEDURAL STATES: Chronic | ICD-10-CM

## 2021-02-16 DIAGNOSIS — Z86.69 PERSONAL HISTORY OF OTHER DISEASES OF THE NERVOUS SYSTEM AND SENSE ORGANS: Chronic | ICD-10-CM

## 2021-02-16 PROCEDURE — G0277: CPT

## 2021-02-16 PROCEDURE — 99183 HYPERBARIC OXYGEN THERAPY: CPT

## 2021-02-16 PROCEDURE — 82962 GLUCOSE BLOOD TEST: CPT

## 2021-02-17 ENCOUNTER — OUTPATIENT (OUTPATIENT)
Dept: OUTPATIENT SERVICES | Facility: HOSPITAL | Age: 71
LOS: 1 days | Discharge: HOME | End: 2021-02-17
Payer: MEDICARE

## 2021-02-17 ENCOUNTER — APPOINTMENT (OUTPATIENT)
Dept: HYPERBARIC MEDICINE | Facility: HOSPITAL | Age: 71
End: 2021-02-17
Payer: MEDICARE

## 2021-02-17 VITALS
DIASTOLIC BLOOD PRESSURE: 58 MMHG | SYSTOLIC BLOOD PRESSURE: 139 MMHG | HEART RATE: 62 BPM | TEMPERATURE: 97.1 F | OXYGEN SATURATION: 99 % | RESPIRATION RATE: 20 BRPM

## 2021-02-17 VITALS
RESPIRATION RATE: 20 BRPM | DIASTOLIC BLOOD PRESSURE: 54 MMHG | TEMPERATURE: 97.2 F | OXYGEN SATURATION: 97 % | HEART RATE: 62 BPM | SYSTOLIC BLOOD PRESSURE: 130 MMHG

## 2021-02-17 DIAGNOSIS — S02.91XA UNSPECIFIED FRACTURE OF SKULL, INITIAL ENCOUNTER FOR CLOSED FRACTURE: Chronic | ICD-10-CM

## 2021-02-17 DIAGNOSIS — M75.00 ADHESIVE CAPSULITIS OF UNSPECIFIED SHOULDER: Chronic | ICD-10-CM

## 2021-02-17 DIAGNOSIS — L97.416 NON-PRESSURE CHRONIC ULCER OF RIGHT HEEL AND MIDFOOT WITH BONE INVOLVEMENT WITHOUT EVIDENCE OF NECROSIS: ICD-10-CM

## 2021-02-17 DIAGNOSIS — Z98.890 OTHER SPECIFIED POSTPROCEDURAL STATES: Chronic | ICD-10-CM

## 2021-02-17 DIAGNOSIS — E11.621 TYPE 2 DIABETES MELLITUS WITH FOOT ULCER: ICD-10-CM

## 2021-02-17 DIAGNOSIS — Z79.4 LONG TERM (CURRENT) USE OF INSULIN: ICD-10-CM

## 2021-02-17 DIAGNOSIS — Z86.69 PERSONAL HISTORY OF OTHER DISEASES OF THE NERVOUS SYSTEM AND SENSE ORGANS: Chronic | ICD-10-CM

## 2021-02-17 PROCEDURE — 82962 GLUCOSE BLOOD TEST: CPT

## 2021-02-17 PROCEDURE — G0277: CPT

## 2021-02-17 PROCEDURE — 99183 HYPERBARIC OXYGEN THERAPY: CPT

## 2021-02-17 NOTE — ADDENDUM
[FreeTextEntry1] : Pt descended to 2.0 EULALIO @ 1.75 PSI/MIN without incident in chamber #3.\par Pt resting comfortably @ depth, with equal chest rise observed throughout tx.\par Pt ascended from 2.0 EULALIO @ 1.75 PSI/MIN without incident in chamber #3.\par Pt tolerated treatment well.\par

## 2021-02-17 NOTE — ASSESSMENT
[No change from previous assessment] : No change from previous assessment [Patient descended without problem for 9 minutes] : Patient descended without problem for 9 minutes [No dizziness or thirst] :  No dizziness or thirst [No ear problems] : No ear problems [Tolerating dive well] : Tolerating dive well [Vital signs stable] : Vital signs stable [No Chest Pain, shortness of breath] : No Chest Pain, shortness of breath [Respiratory Rate Stable] : Respiratory Rate Stable [No chest pain, shortness of breath, or ear pain] :  No chest pain, shortness of breath, or ear pain  [Tolerated Ascent well] : Tolerated Ascent well [Vital Signs stable] : Vital Signs stable [A physician was present throughout the entire HBOT] : A physician was present throughout the entire HBOT [No] : No [Clinically Stable] : Clinically stable [Continue Treatment Plan] : Continue treatment plan [0] : 0 out of 10

## 2021-02-17 NOTE — PROCEDURE

## 2021-02-17 NOTE — ADDENDUM
[FreeTextEntry1] : PT ARRIVED VIA WHEELCHAIR A&OX3\par ALL VITALS WITHIN PARAMETERS FOR HBOT. \par PICC LINE MEASURED AND WRAPPED PRIOR TO DESCENT\par DRESSING CHANGED PRIOR TO DESCENT DUE TO LOOSE BANDAGE THAT WAS REMOVED WITH PTS SOCK.\par TRANSFER OF CARE TO \par \par Pt descended to 2.0 EULALIO @ 1.75 PSI/MIN without incident in chamber #1.\par Pt resting comfortably @ depth, with equal chest rise observed throughout tx.\par Pt ascended from 2.0 EULALIO @ 1.75 PSI/MIN without incident in chamber #1.\par Pt tolerated treatment well.\par

## 2021-02-17 NOTE — PROCEDURE
[Outpatient] : Outpatient [Ambulatory] : Patient is ambulatory. [Wheelchair] : wheelchair [THIS CHAMBER HAS BEEN CLEANED / DISINFECTED] : This chamber has been cleaned / disinfected according to local and hospital policy and procedure prior to this treatment. [Patient demonstrated and verbalized proper technique for using air break mask] : Patient demonstrated and verbalized proper technique for using air break mask [Patient educated on the risks of SMOKING prior to HBOT with understanding] : Patient educated on the risks of SMOKING prior to HBOT with understanding [Patient educated on the risks of CONSUMING ALCOHOL prior to HBOT with understanding] : Patient educated on the risks of CONSUMING ALCOHOL prior to HBOT with understanding [100% Cotton] : 100% cotton [Empty all pockets] : empty all pockets [No hair oils, wigs, hairpieces, pins] : no hair oils, wigs, hairpieces, pins  [Pre tx medications] : pre tx medications  [No make-up, creams] : no make-up, creams  [No jewelry] : no jewelry  [No matches, cigarettes, lighters] : no matches, cigarettes, lighters  [Hearing aid removed] : hearing aid removed [Dentures removed] : dentures removed [Ground bracelet on pt's wrist] : ground bracelet on pt's wrist  [Contacts removed] : contacts removed  [Remove nail polish] : remove nail polish  [No reading material] : no reading material  [Bra, undergarments removed] : bra, undergarments removed  [No contraindicated dressings] : no contraindicated dressings [Ground Wire - VISUAL Verification - Intact/Free of Obstruction] : Ground Wire - VISUAL Verification - Intact/Free of Obstruction  [Ground Continuity - Verified < 1 ohm w/ Wrist Strap Channing] : Ground Continuity - Verified < 1 ohm w/ Wrist Strap Channing [Number: ___] : Number: [unfilled] [Diagnosis: ___] : Diagnosis: [unfilled] [Clear all fields] : clear all fields [____] : Post-Dive: Time - [unfilled] [___] : Post-Dive: Value - [unfilled] mg/dL [] : No [FreeTextEntry3] : 90 [FreeTextEntry5] : 4677 [FreeTextEntry9] : 8713 [FreeTextEntry7] : 8122 [de-identified] : 1394 [de-identified] : 108mins

## 2021-02-18 ENCOUNTER — APPOINTMENT (OUTPATIENT)
Dept: HYPERBARIC MEDICINE | Facility: HOSPITAL | Age: 71
End: 2021-02-18

## 2021-02-18 ENCOUNTER — NON-APPOINTMENT (OUTPATIENT)
Age: 71
End: 2021-02-18

## 2021-02-19 ENCOUNTER — OUTPATIENT (OUTPATIENT)
Dept: OUTPATIENT SERVICES | Facility: HOSPITAL | Age: 71
LOS: 1 days | Discharge: HOME | End: 2021-02-19
Payer: MEDICARE

## 2021-02-19 ENCOUNTER — APPOINTMENT (OUTPATIENT)
Dept: HYPERBARIC MEDICINE | Facility: HOSPITAL | Age: 71
End: 2021-02-19

## 2021-02-19 VITALS
RESPIRATION RATE: 20 BRPM | HEART RATE: 67 BPM | TEMPERATURE: 97.7 F | BODY MASS INDEX: 41.02 KG/M2 | WEIGHT: 293 LBS | HEIGHT: 71 IN | OXYGEN SATURATION: 99 % | SYSTOLIC BLOOD PRESSURE: 144 MMHG | DIASTOLIC BLOOD PRESSURE: 51 MMHG

## 2021-02-19 DIAGNOSIS — Z98.890 OTHER SPECIFIED POSTPROCEDURAL STATES: Chronic | ICD-10-CM

## 2021-02-19 DIAGNOSIS — E11.621 TYPE 2 DIABETES MELLITUS WITH FOOT ULCER: ICD-10-CM

## 2021-02-19 DIAGNOSIS — S02.91XA UNSPECIFIED FRACTURE OF SKULL, INITIAL ENCOUNTER FOR CLOSED FRACTURE: Chronic | ICD-10-CM

## 2021-02-19 DIAGNOSIS — Z86.69 PERSONAL HISTORY OF OTHER DISEASES OF THE NERVOUS SYSTEM AND SENSE ORGANS: Chronic | ICD-10-CM

## 2021-02-19 DIAGNOSIS — M75.00 ADHESIVE CAPSULITIS OF UNSPECIFIED SHOULDER: Chronic | ICD-10-CM

## 2021-02-19 LAB — SARS-COV-2 RNA SPEC QL NAA+PROBE: SIGNIFICANT CHANGE UP

## 2021-02-19 PROCEDURE — U0003: CPT

## 2021-02-19 PROCEDURE — 15275 SKIN SUB GRAFT FACE/NK/HF/G: CPT

## 2021-02-19 PROCEDURE — U0005: CPT

## 2021-02-19 NOTE — PLAN
[FreeTextEntry1] : Patient examined and evaluated at this time.\par Continue local wound care and offloading.\par Will continue HBOT at this time.\par Will auth for apligraf.

## 2021-02-19 NOTE — REVIEW OF SYSTEMS
[Skin Wound] : skin wound [Fever] : no fever [Eye Pain] : no eye pain [Earache] : no earache [Chest Pain] : no chest pain [Shortness Of Breath] : no shortness of breath [Cough] : no cough [Abdominal Pain] : no abdominal pain [FreeTextEntry2] : morbid obesity [de-identified] : right foot plantar diabetic ulcer down to skin, subcutaneous tissue, fat, tendon, bone [de-identified] : diabetic neuropathy

## 2021-02-19 NOTE — ASSESSMENT
[Verbal] : Verbal [Written] : Written [Demo] : Demo [Patient] : Patient [Fair - mild discomfort, physical impairment, low acceptance] : Fair - mild discomfort, physical impairment, low acceptance [Verbalizes knowledge/Understanding] : Verbalizes knowledge/understanding [Dressing changes] : dressing changes [Foot Care] : foot care [Pressure relief] : pressure relief [Signs and symptoms of infection] : sign and symptoms of infection [How and When to Call] : how and when to call [Off-loading] : off-loading [Home Health] : home health [Patient responsibility to plan of care] : patient responsibility to plan of care [Glycemic Control] : glycemic control [Stable] : stable [Home] : Home [Wheelchair] : Wheelchair [Skin Care] : skin care [Venous Disease] : venous disease [Nutrition] : nutrition [Hyperbaric Therapy] : hyperbaric therapy [Compression Therapy] : compression therapy [] : Yes [Not Applicable - Long Term Care/Home Health Agency] : Long Term Care/Home Health Agency: Not Applicable [FreeTextEntry2] : Infection Prevention\par Promote Skin Integrity\par Offloading\par Elevation\par Compression Compliance\par Low Na+ Diet\par Maintain acceptable levels of pain\par Demonstrates use of both pharmacological and nonpharmacological pain management interventions\par Encourage Glycemic Control\par Weight reduction Strategies\par HBOT\par  [FreeTextEntry4] : F/U 1 week for assessment, continue daily HBOT\par Pt to return 2/15/21 for dressing change\par Preauthorization for Sharps debridement and Apligraf submitted today for next assessment as per DPM\par Covid 19 PCR obtained today and sent to lab\par

## 2021-02-19 NOTE — PHYSICAL EXAM
[4 x 4] : 4 x 4  [Abdominal Pad] : Abdominal Pad [2+] : left 2+ [Skin Ulcer] : ulcer [Calm] : calm [Ankle Swelling (On Exam)] : not present [Varicose Veins Of Lower Extremities] : not present [] : not present [de-identified] : A&Ox3, NAD [de-identified] : s/p right foot sesamoidectomy, 3/5 strength in all quadrants bilaterally [de-identified] : right foot plantar diabetic ulcer down to skin, subcutaneous tissue, fat, tendon, bone, healing well [de-identified] : Diminished light touch sensation bilaterally [de-identified] : Circ neurovascular function WNL post Ace wraps, pt expressed comfort.\par Dressing changed by DPM [FreeTextEntry1] : Plantar Foot 1 st met [FreeTextEntry2] : 5.5 [FreeTextEntry3] : 2.1 [FreeTextEntry4] : 0.1 [de-identified] : serosanguineous [de-identified] : Mild [de-identified] : 90-95% [de-identified] : 5-10% [de-identified] : Cleansed with Normal saline\par \par  [de-identified] : Silver Alginate [TWNoteComboBox1] : Right [TWNoteComboBox4] : Moderate [TWNoteComboBox5] : No [TWNoteComboBox6] : Surgical [de-identified] : Macerated [de-identified] : No [de-identified] : None [de-identified] : None [de-identified] : >75% [de-identified] : Yes [de-identified] : Ace wraps [de-identified] : Primary Dressing [de-identified] : 3x Weekly

## 2021-02-19 NOTE — HISTORY OF PRESENT ILLNESS
[FreeTextEntry1] : 70 year old female seen for right foot diabetic ulcer down to skin, subcutaneous tissue, fat, tendon, and bone. Pt currently in HBO at this time.

## 2021-02-20 ENCOUNTER — OUTPATIENT (OUTPATIENT)
Dept: OUTPATIENT SERVICES | Facility: HOSPITAL | Age: 71
LOS: 1 days | Discharge: HOME | End: 2021-02-20
Payer: MEDICARE

## 2021-02-20 ENCOUNTER — APPOINTMENT (OUTPATIENT)
Dept: HYPERBARIC MEDICINE | Facility: HOSPITAL | Age: 71
End: 2021-02-20
Payer: MEDICARE

## 2021-02-20 VITALS
SYSTOLIC BLOOD PRESSURE: 148 MMHG | OXYGEN SATURATION: 99 % | TEMPERATURE: 97.1 F | DIASTOLIC BLOOD PRESSURE: 64 MMHG | HEART RATE: 60 BPM | RESPIRATION RATE: 16 BRPM

## 2021-02-20 VITALS
HEART RATE: 96 BPM | OXYGEN SATURATION: 99 % | DIASTOLIC BLOOD PRESSURE: 58 MMHG | SYSTOLIC BLOOD PRESSURE: 114 MMHG | TEMPERATURE: 98 F | RESPIRATION RATE: 16 BRPM

## 2021-02-20 DIAGNOSIS — M75.00 ADHESIVE CAPSULITIS OF UNSPECIFIED SHOULDER: Chronic | ICD-10-CM

## 2021-02-20 DIAGNOSIS — S02.91XA UNSPECIFIED FRACTURE OF SKULL, INITIAL ENCOUNTER FOR CLOSED FRACTURE: Chronic | ICD-10-CM

## 2021-02-20 DIAGNOSIS — Z98.890 OTHER SPECIFIED POSTPROCEDURAL STATES: Chronic | ICD-10-CM

## 2021-02-20 DIAGNOSIS — E11.621 TYPE 2 DIABETES MELLITUS WITH FOOT ULCER: ICD-10-CM

## 2021-02-20 DIAGNOSIS — Z86.69 PERSONAL HISTORY OF OTHER DISEASES OF THE NERVOUS SYSTEM AND SENSE ORGANS: Chronic | ICD-10-CM

## 2021-02-20 PROCEDURE — 99183 HYPERBARIC OXYGEN THERAPY: CPT

## 2021-02-20 PROCEDURE — 82962 GLUCOSE BLOOD TEST: CPT

## 2021-02-20 PROCEDURE — G0277: CPT

## 2021-02-20 NOTE — PROCEDURE
[Outpatient] : Outpatient [Ambulatory] : Patient is ambulatory. [Wheelchair] : wheelchair [THIS CHAMBER HAS BEEN CLEANED / DISINFECTED] : This chamber has been cleaned / disinfected according to local and hospital policy and procedure prior to this treatment. [____] : Post-Dive: Time - [unfilled] [___] : Post-Dive: Value - [unfilled] mg/dL [Patient demonstrated and verbalized proper technique for using air break mask] : Patient demonstrated and verbalized proper technique for using air break mask [Patient educated on the risks of SMOKING prior to HBOT with understanding] : Patient educated on the risks of SMOKING prior to HBOT with understanding [Patient educated on the risks of CONSUMING ALCOHOL prior to HBOT with understanding] : Patient educated on the risks of CONSUMING ALCOHOL prior to HBOT with understanding [100% Cotton] : 100% cotton [Empty all pockets] : empty all pockets [No hair oils, wigs, hairpieces, pins] : no hair oils, wigs, hairpieces, pins  [Pre tx medications] : pre tx medications  [No make-up, creams] : no make-up, creams  [No jewelry] : no jewelry  [Hearing aid removed] : hearing aid removed [No matches, cigarettes, lighters] : no matches, cigarettes, lighters  [Dentures removed] : dentures removed [Ground bracelet on pt's wrist] : ground bracelet on pt's wrist  [Contacts removed] : contacts removed  [Remove nail polish] : remove nail polish  [No reading material] : no reading material  [Bra, undergarments removed] : bra, undergarments removed  [No contraindicated dressings] : no contraindicated dressings [Ground Continuity - Verified < 1 ohm w/ Wrist Strap Channing] : Ground Continuity - Verified < 1 ohm w/ Wrist Strap Channing [Ground Wire - VISUAL Verification - Intact/Free of Obstruction] : Ground Wire - VISUAL Verification - Intact/Free of Obstruction  [Diagnosis: ___] : Diagnosis: [unfilled] [Number: ___] : Number: [unfilled] [Clear all fields] : clear all fields [] : No [FreeTextEntry3] : 90 [FreeTextEntry5] : 3610 [FreeTextEntry7] : 6359 [FreeTextEntry9] : 1118 [de-identified] : 1120 [de-identified] : 108mins

## 2021-02-20 NOTE — ADDENDUM
[FreeTextEntry1] : Pt's PICC was measured and wrapped by LPN prior tx. \par Pt descended to 2.0 EULALIO @ 1.75 PSI/min without incident in chamber #3\par Pt resting @ depth with chest rise and fall observed throughout tx. \par Pt ascended from 2.0 EULALIO @ 1.75 PSI/min without incident. \par Pt tolerated tx well. Pt's PICC was measured and secured by LPN post tx.\par

## 2021-02-20 NOTE — ASSESSMENT
[No change from previous assessment] : No change from previous assessment [Patient descended without problem for 9 minutes] : Patient descended without problem for 9 minutes [No dizziness or thirst] :  No dizziness or thirst [No ear problems] : No ear problems [Vital signs stable] : Vital signs stable [Tolerating dive well] : Tolerating dive well [No Chest Pain, shortness of breath] : No Chest Pain, shortness of breath [Respiratory Rate Stable] : Respiratory Rate Stable [No chest pain, shortness of breath, or ear pain] :  No chest pain, shortness of breath, or ear pain  [Tolerated Ascent well] : Tolerated Ascent well [Vital Signs stable] : Vital Signs stable [A physician was present throughout the entire HBOT] : A physician was present throughout the entire HBOT [No] : No [Clinically Stable] : Clinically stable [Continue Treatment Plan] : Continue treatment plan [0] : 0 out of 10 [Patient prepared for dive] : Patient prepared for dive

## 2021-02-22 ENCOUNTER — OUTPATIENT (OUTPATIENT)
Dept: OUTPATIENT SERVICES | Facility: HOSPITAL | Age: 71
LOS: 1 days | Discharge: HOME | End: 2021-02-22
Payer: MEDICARE

## 2021-02-22 ENCOUNTER — APPOINTMENT (OUTPATIENT)
Dept: HYPERBARIC MEDICINE | Facility: HOSPITAL | Age: 71
End: 2021-02-22
Payer: MEDICARE

## 2021-02-22 VITALS
TEMPERATURE: 97.6 F | DIASTOLIC BLOOD PRESSURE: 61 MMHG | RESPIRATION RATE: 18 BRPM | HEART RATE: 70 BPM | OXYGEN SATURATION: 96 % | SYSTOLIC BLOOD PRESSURE: 135 MMHG

## 2021-02-22 VITALS
RESPIRATION RATE: 20 BRPM | OXYGEN SATURATION: 99 % | HEART RATE: 63 BPM | TEMPERATURE: 97.4 F | SYSTOLIC BLOOD PRESSURE: 157 MMHG | DIASTOLIC BLOOD PRESSURE: 61 MMHG

## 2021-02-22 DIAGNOSIS — E11.621 TYPE 2 DIABETES MELLITUS WITH FOOT ULCER: ICD-10-CM

## 2021-02-22 DIAGNOSIS — Z86.69 PERSONAL HISTORY OF OTHER DISEASES OF THE NERVOUS SYSTEM AND SENSE ORGANS: Chronic | ICD-10-CM

## 2021-02-22 DIAGNOSIS — M75.00 ADHESIVE CAPSULITIS OF UNSPECIFIED SHOULDER: Chronic | ICD-10-CM

## 2021-02-22 DIAGNOSIS — L97.416 NON-PRESSURE CHRONIC ULCER OF RIGHT HEEL AND MIDFOOT WITH BONE INVOLVEMENT WITHOUT EVIDENCE OF NECROSIS: ICD-10-CM

## 2021-02-22 DIAGNOSIS — Z79.4 LONG TERM (CURRENT) USE OF INSULIN: ICD-10-CM

## 2021-02-22 DIAGNOSIS — S02.91XA UNSPECIFIED FRACTURE OF SKULL, INITIAL ENCOUNTER FOR CLOSED FRACTURE: Chronic | ICD-10-CM

## 2021-02-22 DIAGNOSIS — Z98.890 OTHER SPECIFIED POSTPROCEDURAL STATES: Chronic | ICD-10-CM

## 2021-02-22 PROCEDURE — G0277: CPT

## 2021-02-22 PROCEDURE — 82962 GLUCOSE BLOOD TEST: CPT

## 2021-02-22 PROCEDURE — 99183 HYPERBARIC OXYGEN THERAPY: CPT

## 2021-02-22 NOTE — ADDENDUM
[FreeTextEntry1] : The pt presented to [facility] minimally ambulatory in wheelchair and with cane for scheduled HBOT.\par The pt's pre-dive screening was found to be within acceptable limits to begin HBOT.\par The pt was provided off-loading/elevation measure to comfort prior to HBOT.\par \par The pt's wound dressing was noted to be loosely affixed and fell off during pre-dive screening.\par Nurse provided pt with outer dressing change/re-enforcement. \par \par Nurse measured and wrapped pt's PICC line prior to start of HBOT.\par \par The pt descended @ 1.75 PSI/min to Rx'd tx depth of 2.0 EULALIO in chamber # 3 without incident.\par The pt was observed with visible chest motion and without incident for the duration of HBOT.\par PT ASCENDED FROM TX DEPTH WITHOUT INCIDENT \par PT MEASURED PICC LINE POST HBOT BY RN \par PT TOLERATED TX WELL

## 2021-02-22 NOTE — PROCEDURE
[Outpatient] : Outpatient [Ambulatory] : Patient is ambulatory. [Cane] : cane [Wheelchair] : wheelchair [THIS CHAMBER HAS BEEN CLEANED / DISINFECTED] : This chamber has been cleaned / disinfected according to local and hospital policy and procedure prior to this treatment. [Patient demonstrated and verbalized proper technique for using air break mask] : Patient demonstrated and verbalized proper technique for using air break mask [Patient educated on the risks of SMOKING prior to HBOT with understanding] : Patient educated on the risks of SMOKING prior to HBOT with understanding [Patient educated on the risks of CONSUMING ALCOHOL prior to HBOT with understanding] : Patient educated on the risks of CONSUMING ALCOHOL prior to HBOT with understanding [100% Cotton] : 100% cotton [Empty all pockets] : empty all pockets [No hair oils, wigs, hairpieces, pins] : no hair oils, wigs, hairpieces, pins  [Pre tx medications] : pre tx medications  [No make-up, creams] : no make-up, creams  [No jewelry] : no jewelry  [No matches, cigarettes, lighters] : no matches, cigarettes, lighters  [Hearing aid removed] : hearing aid removed [Dentures removed] : dentures removed [Ground bracelet on pt's wrist] : ground bracelet on pt's wrist  [Contacts removed] : contacts removed  [Remove nail polish] : remove nail polish  [No reading material] : no reading material  [Bra, undergarments removed] : bra, undergarments removed  [No contraindicated dressings] : no contraindicated dressings [Ground Wire - VISUAL Verification - Intact/Free of Obstruction] : Ground Wire - VISUAL Verification - Intact/Free of Obstruction  [Ground Continuity - Verified < 1 ohm w/ Wrist Strap Channing] : Ground Continuity - Verified < 1 ohm w/ Wrist Strap Channing [Number: ___] : Number: [unfilled] [Diagnosis: ___] : Diagnosis: [unfilled] [____] : Post-Dive: Time - [unfilled] [___] : Post-Dive: Value - [unfilled] mg/dL [Clear all fields] : clear all fields [] : No [FreeTextEntry3] : 90 [FreeTextEntry5] : 11 : 55 [FreeTextEntry7] : 12 : 04 [FreeTextEntry9] : 13 : 34 [de-identified] : 13 : 43 [de-identified] : 108 minutes

## 2021-02-23 ENCOUNTER — APPOINTMENT (OUTPATIENT)
Dept: HYPERBARIC MEDICINE | Facility: HOSPITAL | Age: 71
End: 2021-02-23
Payer: MEDICARE

## 2021-02-23 ENCOUNTER — OUTPATIENT (OUTPATIENT)
Dept: OUTPATIENT SERVICES | Facility: HOSPITAL | Age: 71
LOS: 1 days | Discharge: HOME | End: 2021-02-23
Payer: MEDICARE

## 2021-02-23 VITALS
DIASTOLIC BLOOD PRESSURE: 59 MMHG | RESPIRATION RATE: 18 BRPM | TEMPERATURE: 97.8 F | SYSTOLIC BLOOD PRESSURE: 149 MMHG | HEART RATE: 61 BPM | OXYGEN SATURATION: 99 %

## 2021-02-23 VITALS
RESPIRATION RATE: 20 BRPM | HEART RATE: 65 BPM | OXYGEN SATURATION: 96 % | SYSTOLIC BLOOD PRESSURE: 133 MMHG | DIASTOLIC BLOOD PRESSURE: 57 MMHG | TEMPERATURE: 97.6 F

## 2021-02-23 DIAGNOSIS — Z98.890 OTHER SPECIFIED POSTPROCEDURAL STATES: Chronic | ICD-10-CM

## 2021-02-23 DIAGNOSIS — S02.91XA UNSPECIFIED FRACTURE OF SKULL, INITIAL ENCOUNTER FOR CLOSED FRACTURE: Chronic | ICD-10-CM

## 2021-02-23 DIAGNOSIS — L97.416 NON-PRESSURE CHRONIC ULCER OF RIGHT HEEL AND MIDFOOT WITH BONE INVOLVEMENT WITHOUT EVIDENCE OF NECROSIS: ICD-10-CM

## 2021-02-23 DIAGNOSIS — E11.621 TYPE 2 DIABETES MELLITUS WITH FOOT ULCER: ICD-10-CM

## 2021-02-23 DIAGNOSIS — M75.00 ADHESIVE CAPSULITIS OF UNSPECIFIED SHOULDER: Chronic | ICD-10-CM

## 2021-02-23 DIAGNOSIS — Z79.4 LONG TERM (CURRENT) USE OF INSULIN: ICD-10-CM

## 2021-02-23 DIAGNOSIS — Z86.69 PERSONAL HISTORY OF OTHER DISEASES OF THE NERVOUS SYSTEM AND SENSE ORGANS: Chronic | ICD-10-CM

## 2021-02-23 PROCEDURE — G0277: CPT

## 2021-02-23 PROCEDURE — 99183 HYPERBARIC OXYGEN THERAPY: CPT

## 2021-02-23 PROCEDURE — 82962 GLUCOSE BLOOD TEST: CPT

## 2021-02-23 NOTE — PROCEDURE
[Outpatient] : Outpatient [Ambulatory] : Patient is ambulatory. [Cane] : cane [Wheelchair] : wheelchair [THIS CHAMBER HAS BEEN CLEANED / DISINFECTED] : This chamber has been cleaned / disinfected according to local and hospital policy and procedure prior to this treatment. [Patient demonstrated and verbalized proper technique for using air break mask] : Patient demonstrated and verbalized proper technique for using air break mask [Patient educated on the risks of SMOKING prior to HBOT with understanding] : Patient educated on the risks of SMOKING prior to HBOT with understanding [Patient educated on the risks of CONSUMING ALCOHOL prior to HBOT with understanding] : Patient educated on the risks of CONSUMING ALCOHOL prior to HBOT with understanding [100% Cotton] : 100% cotton [Empty all pockets] : empty all pockets [No hair oils, wigs, hairpieces, pins] : no hair oils, wigs, hairpieces, pins  [Pre tx medications] : pre tx medications  [No make-up, creams] : no make-up, creams  [No jewelry] : no jewelry  [No matches, cigarettes, lighters] : no matches, cigarettes, lighters  [Hearing aid removed] : hearing aid removed [Dentures removed] : dentures removed [Ground bracelet on pt's wrist] : ground bracelet on pt's wrist  [Contacts removed] : contacts removed  [Remove nail polish] : remove nail polish  [No reading material] : no reading material  [Bra, undergarments removed] : bra, undergarments removed  [No contraindicated dressings] : no contraindicated dressings [Ground Wire - VISUAL Verification - Intact/Free of Obstruction] : Ground Wire - VISUAL Verification - Intact/Free of Obstruction  [Ground Continuity - Verified < 1 ohm w/ Wrist Strap Channing] : Ground Continuity - Verified < 1 ohm w/ Wrist Strap Channing [Number: ___] : Number: [unfilled] [Diagnosis: ___] : Diagnosis: [unfilled] [____] : Post-Dive: Time - [unfilled] [___] : Post-Dive: Value - [unfilled] mg/dL [Clear all fields] : clear all fields [] : No [FreeTextEntry3] : 90 [FreeTextEntry5] : 1513 [FreeSt[facility] LufkintEntry7] : 6629 [FreeTextEntry9] : 3140 [de-identified] : 1453 [de-identified] : 108 minutes

## 2021-02-23 NOTE — ADDENDUM
[FreeTextEntry1] : PT RECEIVED DRESSING CHANGE BY RN PRE HBOT\par PTS PICC LINE WAS MEASURED PRE-HBOT \par PT DESCENDED TO 2.0 EULALIO @ 1.75 PSI/MIN WITHOUT INCIDENT IN CHAMBER #2\par PT RESTING AT TX DEPTH WITH VISIBLE CHEST RISE AND FALL OBSERVED CHAMBERSIDE \par PT ASCENDED FROM TX DEPTH WITHOUT INCIDENT \par PTS PICC LINE MEASURED POST HBOT \par PT TOLERATED TX WELL

## 2021-02-24 ENCOUNTER — OUTPATIENT (OUTPATIENT)
Dept: OUTPATIENT SERVICES | Facility: HOSPITAL | Age: 71
LOS: 1 days | Discharge: HOME | End: 2021-02-24
Payer: MEDICARE

## 2021-02-24 ENCOUNTER — APPOINTMENT (OUTPATIENT)
Dept: HYPERBARIC MEDICINE | Facility: HOSPITAL | Age: 71
End: 2021-02-24
Payer: MEDICARE

## 2021-02-24 VITALS
HEART RATE: 70 BPM | TEMPERATURE: 97.4 F | DIASTOLIC BLOOD PRESSURE: 52 MMHG | OXYGEN SATURATION: 97 % | SYSTOLIC BLOOD PRESSURE: 137 MMHG | RESPIRATION RATE: 18 BRPM

## 2021-02-24 VITALS
TEMPERATURE: 97 F | RESPIRATION RATE: 18 BRPM | HEART RATE: 59 BPM | DIASTOLIC BLOOD PRESSURE: 61 MMHG | OXYGEN SATURATION: 98 % | SYSTOLIC BLOOD PRESSURE: 142 MMHG

## 2021-02-24 DIAGNOSIS — Z98.890 OTHER SPECIFIED POSTPROCEDURAL STATES: Chronic | ICD-10-CM

## 2021-02-24 DIAGNOSIS — E11.621 TYPE 2 DIABETES MELLITUS WITH FOOT ULCER: ICD-10-CM

## 2021-02-24 DIAGNOSIS — L97.416 NON-PRESSURE CHRONIC ULCER OF RIGHT HEEL AND MIDFOOT WITH BONE INVOLVEMENT WITHOUT EVIDENCE OF NECROSIS: ICD-10-CM

## 2021-02-24 DIAGNOSIS — S02.91XA UNSPECIFIED FRACTURE OF SKULL, INITIAL ENCOUNTER FOR CLOSED FRACTURE: Chronic | ICD-10-CM

## 2021-02-24 DIAGNOSIS — M75.00 ADHESIVE CAPSULITIS OF UNSPECIFIED SHOULDER: Chronic | ICD-10-CM

## 2021-02-24 DIAGNOSIS — Z79.4 LONG TERM (CURRENT) USE OF INSULIN: ICD-10-CM

## 2021-02-24 DIAGNOSIS — Z86.69 PERSONAL HISTORY OF OTHER DISEASES OF THE NERVOUS SYSTEM AND SENSE ORGANS: Chronic | ICD-10-CM

## 2021-02-24 PROCEDURE — 99183 HYPERBARIC OXYGEN THERAPY: CPT

## 2021-02-24 PROCEDURE — 82962 GLUCOSE BLOOD TEST: CPT

## 2021-02-24 PROCEDURE — G0277: CPT

## 2021-02-25 ENCOUNTER — APPOINTMENT (OUTPATIENT)
Dept: HYPERBARIC MEDICINE | Facility: HOSPITAL | Age: 71
End: 2021-02-25
Payer: MEDICARE

## 2021-02-25 ENCOUNTER — OUTPATIENT (OUTPATIENT)
Dept: OUTPATIENT SERVICES | Facility: HOSPITAL | Age: 71
LOS: 1 days | Discharge: HOME | End: 2021-02-25
Payer: MEDICARE

## 2021-02-25 VITALS
HEART RATE: 61 BPM | OXYGEN SATURATION: 99 % | SYSTOLIC BLOOD PRESSURE: 127 MMHG | TEMPERATURE: 97 F | DIASTOLIC BLOOD PRESSURE: 64 MMHG | RESPIRATION RATE: 18 BRPM

## 2021-02-25 VITALS
TEMPERATURE: 98 F | SYSTOLIC BLOOD PRESSURE: 130 MMHG | OXYGEN SATURATION: 97 % | HEART RATE: 66 BPM | DIASTOLIC BLOOD PRESSURE: 58 MMHG | RESPIRATION RATE: 20 BRPM

## 2021-02-25 DIAGNOSIS — M75.00 ADHESIVE CAPSULITIS OF UNSPECIFIED SHOULDER: Chronic | ICD-10-CM

## 2021-02-25 DIAGNOSIS — Z98.890 OTHER SPECIFIED POSTPROCEDURAL STATES: Chronic | ICD-10-CM

## 2021-02-25 DIAGNOSIS — Z79.4 LONG TERM (CURRENT) USE OF INSULIN: ICD-10-CM

## 2021-02-25 DIAGNOSIS — S02.91XA UNSPECIFIED FRACTURE OF SKULL, INITIAL ENCOUNTER FOR CLOSED FRACTURE: Chronic | ICD-10-CM

## 2021-02-25 DIAGNOSIS — E11.621 TYPE 2 DIABETES MELLITUS WITH FOOT ULCER: ICD-10-CM

## 2021-02-25 DIAGNOSIS — Z86.69 PERSONAL HISTORY OF OTHER DISEASES OF THE NERVOUS SYSTEM AND SENSE ORGANS: Chronic | ICD-10-CM

## 2021-02-25 DIAGNOSIS — L97.416 NON-PRESSURE CHRONIC ULCER OF RIGHT HEEL AND MIDFOOT WITH BONE INVOLVEMENT WITHOUT EVIDENCE OF NECROSIS: ICD-10-CM

## 2021-02-25 PROCEDURE — 99183 HYPERBARIC OXYGEN THERAPY: CPT

## 2021-02-25 PROCEDURE — G0277: CPT

## 2021-02-25 PROCEDURE — 82962 GLUCOSE BLOOD TEST: CPT

## 2021-02-25 NOTE — PHYSICAL EXAM
[4 x 4] : 4 x 4  [Abdominal Pad] : Abdominal Pad [2+] : left 2+ [Ankle Swelling (On Exam)] : not present [Varicose Veins Of Lower Extremities] : not present [] : not present [Skin Ulcer] : ulcer [Alert] : alert [Oriented to Person] : oriented to person [Oriented to Place] : oriented to place [Oriented to Time] : oriented to time [Calm] : calm [de-identified] : A&Ox3, NAD [de-identified] : HTN, diabetic small vessel disease  [de-identified] : s/p right foot sesamoidectomy, 3/5 strength in all quadrants bilaterally [de-identified] : right foot plantar diabetic ulcer down to skin, subcutaneous tissue, fat, tendon, bone, healing well [de-identified] : Diminished light touch sensation bilaterally [de-identified] : Circ neurovascular function WNL post Ace wraps, pt expressed comfort.\par Dressing changed by DPM\par Apligraf 44 sq cm\par 100% Used 0% Discarded\par Exp: 02/23/2021\par [id number]\par Lot:[id number]\par pH 7.3-7.5 [FreeTextEntry1] : Plantar Foot 1 st met [FreeTextEntry2] : 5.0 [FreeTextEntry3] : 2.0 [FreeTextEntry4] : 0.1-0.3 [de-identified] : serosanguineous [de-identified] : callus [de-identified] : 90-95% [de-identified] : 5-10% [FreeTextEntry5] : post debridement measurement- 5.1 x 2.1 x 0.2-0.4 [de-identified] : Apligraf 44 sq cm 100% Used 0% Discarded [de-identified] :  to hold dressing in place [de-identified] : Wound Veil and Calcium Alginate [de-identified] : Cleansed with Normal saline\par Reconstituted in NS\par Lot [id number]\par Exp: 03/01/2023\par \par \par  [TWNoteComboBox1] : Right [TWNoteComboBox4] : Moderate [TWNoteComboBox5] : No [TWNoteComboBox6] : Surgical [de-identified] : No [de-identified] : other [de-identified] : None [de-identified] : None [de-identified] : >75% [de-identified] : Yes [de-identified] : 2.5% Lidocaine Topical [TWNoteComboBox7] : Arlin [de-identified] : Application of skin substitute [de-identified] : Ace wraps [de-identified] : Weekly [de-identified] : Primary Dressing

## 2021-02-25 NOTE — ASSESSMENT
[Verbal] : Verbal [Demo] : Demo [Patient] : Patient [Fair - mild discomfort, physical impairment, low acceptance] : Fair - mild discomfort, physical impairment, low acceptance [Dressing changes] : dressing changes [Foot Care] : foot care [Skin Care] : skin care [Pressure relief] : pressure relief [Signs and symptoms of infection] : sign and symptoms of infection [Venous Disease] : venous disease [Nutrition] : nutrition [How and When to Call] : how and when to call [Hyperbaric Therapy] : hyperbaric therapy [Off-loading] : off-loading [Compression Therapy] : compression therapy [Home Health] : home health [Patient responsibility to plan of care] : patient responsibility to plan of care [Glycemic Control] : glycemic control [Stable] : stable [Home] : Home [Wheelchair] : Wheelchair [Not Applicable - Long Term Care/Home Health Agency] : Long Term Care/Home Health Agency: Not Applicable [Needs reinforcement] : needs reinforcement [] : No [FreeTextEntry2] : Infection Prevention\par Promote Skin Integrity\par Offloading\par Elevation\par Compression Compliance\par Low Na+ Diet\par Maintain acceptable levels of pain\par Demonstrates use of both pharmacological and nonpharmacological pain management interventions\par Encourage Glycemic Control\par Weight reduction Strategies\par HBOT\par  [FreeTextEntry4] : Covid 19 PCR obtained today\par F/U 1 week for assessment and daily for HBOT\par Preauthorization for sharps debridement and Apligraf #2 submitted for next assessment \par Rx for MRI of the Right Foot to R/O osteo as per DPM

## 2021-02-25 NOTE — PROCEDURE
[Outpatient] : Outpatient [Ambulatory] : Patient is ambulatory. [Wheelchair] : wheelchair [THIS CHAMBER HAS BEEN CLEANED / DISINFECTED] : This chamber has been cleaned / disinfected according to local and hospital policy and procedure prior to this treatment. [Patient demonstrated and verbalized proper technique for using air break mask] : Patient demonstrated and verbalized proper technique for using air break mask [Patient educated on the risks of SMOKING prior to HBOT with understanding] : Patient educated on the risks of SMOKING prior to HBOT with understanding [Patient educated on the risks of CONSUMING ALCOHOL prior to HBOT with understanding] : Patient educated on the risks of CONSUMING ALCOHOL prior to HBOT with understanding [100% Cotton] : 100% cotton [Empty all pockets] : empty all pockets [No hair oils, wigs, hairpieces, pins] : no hair oils, wigs, hairpieces, pins  [No make-up, creams] : no make-up, creams  [Pre tx medications] : pre tx medications  [No jewelry] : no jewelry  [No matches, cigarettes, lighters] : no matches, cigarettes, lighters  [Hearing aid removed] : hearing aid removed [Dentures removed] : dentures removed [Ground bracelet on pt's wrist] : ground bracelet on pt's wrist  [Contacts removed] : contacts removed  [Remove nail polish] : remove nail polish  [No reading material] : no reading material  [Bra, undergarments removed] : bra, undergarments removed  [No contraindicated dressings] : no contraindicated dressings [Ground Wire - VISUAL Verification - Intact/Free of Obstruction] : Ground Wire - VISUAL Verification - Intact/Free of Obstruction  [Ground Continuity - Verified < 1 ohm w/ Wrist Strap Channing] : Ground Continuity - Verified < 1 ohm w/ Wrist Strap Channing [Number: ___] : Number: [unfilled] [Diagnosis: ___] : Diagnosis: [unfilled] [____] : Post-Dive: Time - [unfilled] [___] : Post-Dive: Value - [unfilled] mg/dL [Clear all fields] : clear all fields [] : No [FreeTextEntry3] : 90 [FreeTextEntry5] : 7836 [FreeTextEntry7] : 8147 [FreeTextEntry9] : 9304 [de-identified] : 5911 [de-identified] : 108 minutes

## 2021-02-25 NOTE — REVIEW OF SYSTEMS
[FreeTextEntry1] : 1200 [Fever] : no fever [Eye Pain] : no eye pain [Earache] : no earache [Chest Pain] : no chest pain [Shortness Of Breath] : no shortness of breath [Cough] : no cough [Abdominal Pain] : no abdominal pain [Joint Stiffness] : joint stiffness [Skin Wound] : skin wound [Anxiety] : no anxiety [Easy Bleeding] : no tendency for easy bleeding [FreeTextEntry2] : morbid obesity [FreeTextEntry5] : HTN [de-identified] : right foot plantar diabetic ulcer down to skin, subcutaneous tissue, fat, tendon, bone [de-identified] : diabetic neuropathy [de-identified] : KENNY

## 2021-02-25 NOTE — ADDENDUM
[FreeTextEntry1] : PT'S PICC LINE MEASURED PRIOR TO DESCENT. \par \par PT DESCENDED TO 2.0 EULALIO @ 1.75 PSI/MIN WITHOUT INCIDENT IN CHAMBER # 3. PT'S RESTING AT TX DEPTH WITH CHEST RISE AND FALL OBSERVED CHAMBER SIDE. LEGS ELEVATED WITH WEDGE.\par PT ASCENDED FROM TX DEPTH WITHOUT INCIDENT \par PTS PICC LINE MEASURED POST HBOT BY RN \par PT TOLERATED TX WELL

## 2021-02-25 NOTE — REASON FOR VISIT
[Apligraf] : apligraf [FreeTextEntry5] : Right Plantar Foot 1st Met [FreeTextEntry4] : DFU 3 plantar right 1st metatarsal

## 2021-02-25 NOTE — PROCEDURE
[Scalpel] : scalpel [Sharp curette] : sharp curette [Other: ___] : [unfilled] [Fenestrated] : fenestrated [Hydrated with saline] : hydrated with saline [Apligraf] : apligraf [____ % was used] : and [unfilled] % was used [FreeTextEntry1] : alginate/wound veil  [] : Yes [[facility]] : 7291 [de-identified] : DFU 3 plantar right 1st metatarsal  [de-identified] : Juan Carlos [FreeTextEntry6] : DFU 3 right  [FreeTextEntry7] : same [de-identified] : 0 [de-identified] : 2cc [de-identified] : kiet

## 2021-02-26 ENCOUNTER — OUTPATIENT (OUTPATIENT)
Dept: OUTPATIENT SERVICES | Facility: HOSPITAL | Age: 71
LOS: 1 days | Discharge: HOME | End: 2021-02-26
Payer: MEDICARE

## 2021-02-26 ENCOUNTER — APPOINTMENT (OUTPATIENT)
Dept: HYPERBARIC MEDICINE | Facility: HOSPITAL | Age: 71
End: 2021-02-26
Payer: MEDICARE

## 2021-02-26 VITALS
BODY MASS INDEX: 41.02 KG/M2 | OXYGEN SATURATION: 98 % | DIASTOLIC BLOOD PRESSURE: 75 MMHG | SYSTOLIC BLOOD PRESSURE: 151 MMHG | RESPIRATION RATE: 18 BRPM | HEIGHT: 71 IN | HEART RATE: 66 BPM | WEIGHT: 293 LBS | TEMPERATURE: 97.1 F

## 2021-02-26 DIAGNOSIS — Z98.890 OTHER SPECIFIED POSTPROCEDURAL STATES: Chronic | ICD-10-CM

## 2021-02-26 DIAGNOSIS — S02.91XA UNSPECIFIED FRACTURE OF SKULL, INITIAL ENCOUNTER FOR CLOSED FRACTURE: Chronic | ICD-10-CM

## 2021-02-26 DIAGNOSIS — E11.621 TYPE 2 DIABETES MELLITUS WITH FOOT ULCER: ICD-10-CM

## 2021-02-26 DIAGNOSIS — Z86.69 PERSONAL HISTORY OF OTHER DISEASES OF THE NERVOUS SYSTEM AND SENSE ORGANS: Chronic | ICD-10-CM

## 2021-02-26 DIAGNOSIS — M75.00 ADHESIVE CAPSULITIS OF UNSPECIFIED SHOULDER: Chronic | ICD-10-CM

## 2021-02-26 LAB — SARS-COV-2 RNA SPEC QL NAA+PROBE: SIGNIFICANT CHANGE UP

## 2021-02-26 PROCEDURE — U0003: CPT

## 2021-02-26 PROCEDURE — 15275 SKIN SUB GRAFT FACE/NK/HF/G: CPT

## 2021-02-26 PROCEDURE — 15275 SKIN SUB GRAFT FACE/NK/HF/G: CPT | Mod: 58

## 2021-02-26 PROCEDURE — U0005: CPT

## 2021-02-26 NOTE — REVIEW OF SYSTEMS
[FreeTextEntry1] : 3715 [Fever] : no fever [Eye Pain] : no eye pain [Earache] : no earache [Chest Pain] : no chest pain [Shortness Of Breath] : no shortness of breath [Cough] : no cough [Abdominal Pain] : no abdominal pain [Joint Stiffness] : joint stiffness [Skin Wound] : skin wound [Anxiety] : no anxiety [Easy Bleeding] : no tendency for easy bleeding [FreeTextEntry2] : morbid obesity [FreeTextEntry5] : HTN [de-identified] : right foot plantar diabetic ulcer down to skin, subcutaneous tissue, fat, tendon, bone [de-identified] : diabetic neuropathy [de-identified] : KENNY

## 2021-02-26 NOTE — ASSESSMENT
[Verbal] : Verbal [Demo] : Demo [Patient] : Patient [Fair - mild discomfort, physical impairment, low acceptance] : Fair - mild discomfort, physical impairment, low acceptance [Needs reinforcement] : needs reinforcement [Dressing changes] : dressing changes [Foot Care] : foot care [Skin Care] : skin care [Pressure relief] : pressure relief [Signs and symptoms of infection] : sign and symptoms of infection [Venous Disease] : venous disease [Nutrition] : nutrition [How and When to Call] : how and when to call [Hyperbaric Therapy] : hyperbaric therapy [Off-loading] : off-loading [Compression Therapy] : compression therapy [Home Health] : home health [Patient responsibility to plan of care] : patient responsibility to plan of care [Glycemic Control] : glycemic control [Stable] : stable [Home] : Home [Wheelchair] : Wheelchair [Not Applicable - Long Term Care/Home Health Agency] : Long Term Care/Home Health Agency: Not Applicable [] : Yes [FreeTextEntry2] : Infection Prevention\par Promote Skin Integrity\par Offloading\par Elevation\par Compression Compliance\par Low Na+ Diet\par Maintain acceptable levels of pain\par Demonstrates use of both pharmacological and nonpharmacological pain management interventions\par Encourage Glycemic Control\par Weight reduction Strategies\par HBOT\par  [FreeTextEntry4] : Covid 19 PCR obtained today\par F/U 1 week for assessment and daily for HBOT\par Preauthorization for sharps debridement and Apligraf #3 submitted for next assessment \par MRI of the Right Foot approved, pt scheduling pending

## 2021-02-26 NOTE — PROCEDURE
[Scalpel] : scalpel [Sharp curette] : sharp curette [Other: ___] : [unfilled] [Fenestrated] : fenestrated [Hydrated with saline] : hydrated with saline [Apligraf] : apligraf [____ % was used] : and [unfilled] % was used [____ % was discarded] : and [unfilled] % was discarded [FreeTextEntry1] : adaptic, calcium alginate, dry dressing, ACE [] : Yes [[facility]] : 0093 [de-identified] : DFU 3 plantar right 1st metatarsal  [de-identified] : Nas [de-identified] : Alexandra [FreeTextEntry6] : DFU 3 right  [FreeTextEntry7] : same [de-identified] : 0 [de-identified] : 2cc [de-identified] : kiet

## 2021-02-26 NOTE — PHYSICAL EXAM
[4 x 4] : 4 x 4  [Abdominal Pad] : Abdominal Pad [2+] : left 2+ [Ankle Swelling (On Exam)] : not present [Varicose Veins Of Lower Extremities] : not present [] : not present [Skin Ulcer] : ulcer [Alert] : alert [Oriented to Person] : oriented to person [Oriented to Place] : oriented to place [Oriented to Time] : oriented to time [Calm] : calm [de-identified] : A&Ox3, NAD [de-identified] : HTN, diabetic small vessel disease  [de-identified] : s/p right foot sesamoidectomy, 3/5 strength in all quadrants bilaterally [de-identified] : right foot plantar diabetic ulcer down to skin, subcutaneous tissue, fat, tendon, bone, healing well [de-identified] : Diminished light touch sensation bilaterally [de-identified] : Circ neurovascular function WNL post Ace wraps, pt expressed comfort.\par Apligraf 44 sq cm\par [id number]\par Exp: 02/27/2021\par Lot: [id number]\par Ph 7.3-7.5\par 50% used 50% Discarded\par  [FreeTextEntry1] : Plantar Foot 1 st met [FreeTextEntry2] : 4.7 [FreeTextEntry3] : 2.0 [FreeTextEntry4] : 0.1-0.3 [de-identified] : serosanguineous [de-identified] : 0.3-0.5 cm @ 6-7 o' clock [de-identified] : callus [de-identified] : 90-95% [de-identified] : 5-10% [FreeTextEntry5] : post debridement measurement- 4.8 x 2.1 x 0.4 [de-identified] : Apligraf 44 sq cm- 50% Used 50% Discarded [de-identified] :  to hold dressing in place [de-identified] : Adaptic touch and Calcium Alginate [de-identified] : Cleansed with Normal saline\par Reconstituted in NS\par Lot [id number]\par Exp: 03/01/2023\par \par \par  [TWNoteComboBox1] : Right [TWNoteComboBox4] : Moderate [TWNoteComboBox5] : No [TWNoteComboBox6] : Surgical [de-identified] : Yes [de-identified] : other [de-identified] : None [de-identified] : None [de-identified] : >75% [de-identified] : Yes [de-identified] : 2.5% Lidocaine Topical [TWNoteComboBox7] : Arlin [de-identified] : Application of skin substitute [de-identified] : Ace wraps [de-identified] : Weekly [de-identified] : Primary Dressing

## 2021-02-27 DIAGNOSIS — Z20.822 CONTACT WITH AND (SUSPECTED) EXPOSURE TO COVID-19: ICD-10-CM

## 2021-02-27 DIAGNOSIS — Z88.0 ALLERGY STATUS TO PENICILLIN: ICD-10-CM

## 2021-02-27 DIAGNOSIS — E11.40 TYPE 2 DIABETES MELLITUS WITH DIABETIC NEUROPATHY, UNSPECIFIED: ICD-10-CM

## 2021-02-27 DIAGNOSIS — E11.621 TYPE 2 DIABETES MELLITUS WITH FOOT ULCER: ICD-10-CM

## 2021-02-27 DIAGNOSIS — Z79.82 LONG TERM (CURRENT) USE OF ASPIRIN: ICD-10-CM

## 2021-02-27 DIAGNOSIS — Z98.890 OTHER SPECIFIED POSTPROCEDURAL STATES: ICD-10-CM

## 2021-02-27 DIAGNOSIS — Z79.4 LONG TERM (CURRENT) USE OF INSULIN: ICD-10-CM

## 2021-02-27 DIAGNOSIS — Z83.3 FAMILY HISTORY OF DIABETES MELLITUS: ICD-10-CM

## 2021-02-27 DIAGNOSIS — Z98.49 CATARACT EXTRACTION STATUS, UNSPECIFIED EYE: ICD-10-CM

## 2021-02-27 DIAGNOSIS — L97.416 NON-PRESSURE CHRONIC ULCER OF RIGHT HEEL AND MIDFOOT WITH BONE INVOLVEMENT WITHOUT EVIDENCE OF NECROSIS: ICD-10-CM

## 2021-02-27 DIAGNOSIS — E66.01 MORBID (SEVERE) OBESITY DUE TO EXCESS CALORIES: ICD-10-CM

## 2021-02-27 DIAGNOSIS — Z82.5 FAMILY HISTORY OF ASTHMA AND OTHER CHRONIC LOWER RESPIRATORY DISEASES: ICD-10-CM

## 2021-02-27 DIAGNOSIS — Z79.899 OTHER LONG TERM (CURRENT) DRUG THERAPY: ICD-10-CM

## 2021-03-01 ENCOUNTER — APPOINTMENT (OUTPATIENT)
Dept: HYPERBARIC MEDICINE | Facility: HOSPITAL | Age: 71
End: 2021-03-01
Payer: MEDICARE

## 2021-03-01 ENCOUNTER — OUTPATIENT (OUTPATIENT)
Dept: OUTPATIENT SERVICES | Facility: HOSPITAL | Age: 71
LOS: 1 days | Discharge: HOME | End: 2021-03-01
Payer: MEDICARE

## 2021-03-01 VITALS
TEMPERATURE: 97.1 F | RESPIRATION RATE: 20 BRPM | HEART RATE: 67 BPM | DIASTOLIC BLOOD PRESSURE: 65 MMHG | SYSTOLIC BLOOD PRESSURE: 143 MMHG | OXYGEN SATURATION: 98 %

## 2021-03-01 VITALS
SYSTOLIC BLOOD PRESSURE: 143 MMHG | OXYGEN SATURATION: 99 % | DIASTOLIC BLOOD PRESSURE: 69 MMHG | TEMPERATURE: 97.1 F | RESPIRATION RATE: 18 BRPM | HEART RATE: 63 BPM

## 2021-03-01 DIAGNOSIS — Z86.69 PERSONAL HISTORY OF OTHER DISEASES OF THE NERVOUS SYSTEM AND SENSE ORGANS: Chronic | ICD-10-CM

## 2021-03-01 DIAGNOSIS — E11.621 TYPE 2 DIABETES MELLITUS WITH FOOT ULCER: ICD-10-CM

## 2021-03-01 DIAGNOSIS — S02.91XA UNSPECIFIED FRACTURE OF SKULL, INITIAL ENCOUNTER FOR CLOSED FRACTURE: Chronic | ICD-10-CM

## 2021-03-01 DIAGNOSIS — Z98.890 OTHER SPECIFIED POSTPROCEDURAL STATES: Chronic | ICD-10-CM

## 2021-03-01 DIAGNOSIS — Z79.4 LONG TERM (CURRENT) USE OF INSULIN: ICD-10-CM

## 2021-03-01 DIAGNOSIS — M75.00 ADHESIVE CAPSULITIS OF UNSPECIFIED SHOULDER: Chronic | ICD-10-CM

## 2021-03-01 DIAGNOSIS — L97.416 NON-PRESSURE CHRONIC ULCER OF RIGHT HEEL AND MIDFOOT WITH BONE INVOLVEMENT WITHOUT EVIDENCE OF NECROSIS: ICD-10-CM

## 2021-03-01 PROCEDURE — G0277: CPT

## 2021-03-01 PROCEDURE — 82962 GLUCOSE BLOOD TEST: CPT

## 2021-03-01 PROCEDURE — 99183 HYPERBARIC OXYGEN THERAPY: CPT

## 2021-03-01 NOTE — PROCEDURE
[Outpatient] : Outpatient [Wheelchair] : wheelchair [THIS CHAMBER HAS BEEN CLEANED / DISINFECTED] : This chamber has been cleaned / disinfected according to local and hospital policy and procedure prior to this treatment. [Patient demonstrated and verbalized proper technique for using air break mask] : Patient demonstrated and verbalized proper technique for using air break mask [Patient educated on the risks of SMOKING prior to HBOT with understanding] : Patient educated on the risks of SMOKING prior to HBOT with understanding [Patient educated on the risks of CONSUMING ALCOHOL prior to HBOT with understanding] : Patient educated on the risks of CONSUMING ALCOHOL prior to HBOT with understanding [100% Cotton] : 100% cotton [Empty all pockets] : empty all pockets [No hair oils, wigs, hairpieces, pins] : no hair oils, wigs, hairpieces, pins  [Pre tx medications] : pre tx medications  [No make-up, creams] : no make-up, creams  [No jewelry] : no jewelry  [No matches, cigarettes, lighters] : no matches, cigarettes, lighters  [Hearing aid removed] : hearing aid removed [Dentures removed] : dentures removed [Ground bracelet on pt's wrist] : ground bracelet on pt's wrist  [Contacts removed] : contacts removed  [Remove nail polish] : remove nail polish  [No reading material] : no reading material  [Bra, undergarments removed] : bra, undergarments removed  [No contraindicated dressings] : no contraindicated dressings [Ground Continuity - Verified < 1 ohm w/ Wrist Strap Channing] : Ground Continuity - Verified < 1 ohm w/ Wrist Strap Channing [Ground Wire - VISUAL Verification - Intact/Free of Obstruction] : Ground Wire - VISUAL Verification - Intact/Free of Obstruction  [Number: ___] : Number: [unfilled] [Diagnosis: ___] : Diagnosis: [unfilled] [____] : Post-Dive: Time - [unfilled] [___] : Post-Dive: Value - [unfilled] mg/dL [Clear all fields] : clear all fields [] : No [FreeTextEntry3] : 90 [FreeTextEntry5] : 1195 [FreeTextEntry7] : 4695 [FreeTextEntry9] : 5400 [de-identified] : 2014 [de-identified] : 108 minutes

## 2021-03-01 NOTE — ADDENDUM
[FreeTextEntry1] : PT'S PICC LINE MEASURED PRIOR TO DESCENT.\par PT RECEIVED DRESSING CHANGE PRIOR TO DESCENT. \par PT DESCENDED TO 2.0 EULALIO @ 1.75 PSI/MIN WITHOUT INCIDENT IN CHAMBER # 4 .PT'S RESTING AT TX DEPTH WITH CHEST RISE AND FALL OBSERVED CHAMBER SIDE. LEGS ELEVATED WITH WEDGE.\par PT ASCENDED FROM TX DEPTH WITHOUT INCIDENT \par PT TOLERATED TX WELL

## 2021-03-02 ENCOUNTER — APPOINTMENT (OUTPATIENT)
Dept: HYPERBARIC MEDICINE | Facility: HOSPITAL | Age: 71
End: 2021-03-02
Payer: MEDICARE

## 2021-03-02 ENCOUNTER — OUTPATIENT (OUTPATIENT)
Dept: OUTPATIENT SERVICES | Facility: HOSPITAL | Age: 71
LOS: 1 days | Discharge: HOME | End: 2021-03-02
Payer: MEDICARE

## 2021-03-02 VITALS
OXYGEN SATURATION: 98 % | DIASTOLIC BLOOD PRESSURE: 72 MMHG | HEART RATE: 72 BPM | RESPIRATION RATE: 20 BRPM | SYSTOLIC BLOOD PRESSURE: 156 MMHG | TEMPERATURE: 98.2 F

## 2021-03-02 VITALS
HEART RATE: 61 BPM | OXYGEN SATURATION: 100 % | SYSTOLIC BLOOD PRESSURE: 152 MMHG | RESPIRATION RATE: 18 BRPM | DIASTOLIC BLOOD PRESSURE: 70 MMHG | TEMPERATURE: 97 F

## 2021-03-02 DIAGNOSIS — L97.416 NON-PRESSURE CHRONIC ULCER OF RIGHT HEEL AND MIDFOOT WITH BONE INVOLVEMENT WITHOUT EVIDENCE OF NECROSIS: ICD-10-CM

## 2021-03-02 DIAGNOSIS — E11.621 TYPE 2 DIABETES MELLITUS WITH FOOT ULCER: ICD-10-CM

## 2021-03-02 DIAGNOSIS — Z98.890 OTHER SPECIFIED POSTPROCEDURAL STATES: Chronic | ICD-10-CM

## 2021-03-02 DIAGNOSIS — Z83.3 FAMILY HISTORY OF DIABETES MELLITUS: ICD-10-CM

## 2021-03-02 DIAGNOSIS — E11.40 TYPE 2 DIABETES MELLITUS WITH DIABETIC NEUROPATHY, UNSPECIFIED: ICD-10-CM

## 2021-03-02 DIAGNOSIS — Z82.5 FAMILY HISTORY OF ASTHMA AND OTHER CHRONIC LOWER RESPIRATORY DISEASES: ICD-10-CM

## 2021-03-02 DIAGNOSIS — M75.00 ADHESIVE CAPSULITIS OF UNSPECIFIED SHOULDER: Chronic | ICD-10-CM

## 2021-03-02 DIAGNOSIS — Z79.4 LONG TERM (CURRENT) USE OF INSULIN: ICD-10-CM

## 2021-03-02 DIAGNOSIS — Z98.890 OTHER SPECIFIED POSTPROCEDURAL STATES: ICD-10-CM

## 2021-03-02 DIAGNOSIS — E66.01 MORBID (SEVERE) OBESITY DUE TO EXCESS CALORIES: ICD-10-CM

## 2021-03-02 DIAGNOSIS — Z20.822 CONTACT WITH AND (SUSPECTED) EXPOSURE TO COVID-19: ICD-10-CM

## 2021-03-02 DIAGNOSIS — Z86.69 PERSONAL HISTORY OF OTHER DISEASES OF THE NERVOUS SYSTEM AND SENSE ORGANS: Chronic | ICD-10-CM

## 2021-03-02 DIAGNOSIS — Z98.49 CATARACT EXTRACTION STATUS, UNSPECIFIED EYE: ICD-10-CM

## 2021-03-02 DIAGNOSIS — Z79.82 LONG TERM (CURRENT) USE OF ASPIRIN: ICD-10-CM

## 2021-03-02 DIAGNOSIS — Z79.899 OTHER LONG TERM (CURRENT) DRUG THERAPY: ICD-10-CM

## 2021-03-02 DIAGNOSIS — Z88.0 ALLERGY STATUS TO PENICILLIN: ICD-10-CM

## 2021-03-02 DIAGNOSIS — S02.91XA UNSPECIFIED FRACTURE OF SKULL, INITIAL ENCOUNTER FOR CLOSED FRACTURE: Chronic | ICD-10-CM

## 2021-03-02 PROCEDURE — 82962 GLUCOSE BLOOD TEST: CPT

## 2021-03-02 PROCEDURE — 99183 HYPERBARIC OXYGEN THERAPY: CPT

## 2021-03-02 PROCEDURE — G0277: CPT

## 2021-03-02 NOTE — ADDENDUM
[FreeTextEntry1] : Pts PICC LINE measured PRE HBOT by LPN\par Pt descended to 2.0 EULALIO @ 1.75 PSI/MIN without incident in chamber #3.\par Pt resting comfortably @ depth, with equal chest rise observed throughout tx.\par Pt ascended from 2.0 EULALIO @ 1.75 PSI/MIN without incident in chamber #3.\par Pt received dressing change post HBOT by LPN \par Pts PICC line measured post HBOT\par Pt tolerated treatment well.\par

## 2021-03-02 NOTE — ADDENDUM
[FreeTextEntry1] : PT ARRIVED VIA WHEELCHAIR A&OX3\par PRE TX BGL NOT WITHIN PARAMETERS, MD ADVISED AND CLEARED PT FOR TX.\par ALL OTHER PRE TX VITALS WITHIN PARAMETERS FOR HBOT.\par NO DRAINAGE NOTED ON DRESSING PRIOR TO DESCENT. WOUND CARE TO FOLLOW HBOT.\par TRANSFER OF CARE TO  PRIOR TO DESCENT.\par PT DESCENDED TO 2.0 EULALIO @ 1.75 PSI/MIN WITHOUT INCIDENT IN CHAMBER #2\par PT RESTING AT TX DEPTH WITH VISIBLE CHEST RISE AND FALL OBSERVED CHAMBERSIDE \par PT ASCENDED FROM TX DEPTH WITHOUT INCIDENT \par PT RECEIVED WOUND CARE BY RN POST HBOT \par PT TOLERATED TX WELL \par

## 2021-03-02 NOTE — PROCEDURE
[Outpatient] : Outpatient [Ambulatory] : Patient is ambulatory. [Wheelchair] : wheelchair [THIS CHAMBER HAS BEEN CLEANED / DISINFECTED] : This chamber has been cleaned / disinfected according to local and hospital policy and procedure prior to this treatment. [100% Cotton] : 100% cotton [Empty all pockets] : empty all pockets [No hair oils, wigs, hairpieces, pins] : no hair oils, wigs, hairpieces, pins  [Pre tx medications] : pre tx medications  [No make-up, creams] : no make-up, creams  [No jewelry] : no jewelry  [No matches, cigarettes, lighters] : no matches, cigarettes, lighters  [Hearing aid removed] : hearing aid removed [Dentures removed] : dentures removed [Ground bracelet on pt's wrist] : ground bracelet on pt's wrist  [Contacts removed] : contacts removed  [Remove nail polish] : remove nail polish  [No reading material] : no reading material  [Bra, undergarments removed] : bra, undergarments removed  [No contraindicated dressings] : no contraindicated dressings [Ground Wire - VISUAL Verification - Intact/Free of Obstruction] : Ground Wire - VISUAL Verification - Intact/Free of Obstruction  [Ground Continuity - Verified < 1 ohm w/ Wrist Strap Channing] : Ground Continuity - Verified < 1 ohm w/ Wrist Strap Channing [Number: ___] : Number: [unfilled] [Diagnosis: ___] : Diagnosis: [unfilled] [Patient demonstrated and verbalized proper technique for using air break mask] : Patient demonstrated and verbalized proper technique for using air break mask [Patient educated on the risks of SMOKING prior to HBOT with understanding] : Patient educated on the risks of SMOKING prior to HBOT with understanding [Patient educated on the risks of CONSUMING ALCOHOL prior to HBOT with understanding] : Patient educated on the risks of CONSUMING ALCOHOL prior to HBOT with understanding [____] : Post-Dive: Time - [unfilled] [___] : Post-Dive: Value - [unfilled] mg/dL [Clear all fields] : clear all fields [] : No [FreeTextEntry3] : 90 [FreeTextEntry5] : 11:50 [FreeTextEntry7] : 11:59 [FreeTextEntry9] : 13:29 [de-identified] : 13:38 [de-identified] : 108 minutes

## 2021-03-02 NOTE — PROCEDURE
[Outpatient] : Outpatient [Ambulatory] : Patient is ambulatory. [Wheelchair] : wheelchair [THIS CHAMBER HAS BEEN CLEANED / DISINFECTED] : This chamber has been cleaned / disinfected according to local and hospital policy and procedure prior to this treatment. [Patient demonstrated and verbalized proper technique for using air break mask] : Patient demonstrated and verbalized proper technique for using air break mask [Patient educated on the risks of SMOKING prior to HBOT with understanding] : Patient educated on the risks of SMOKING prior to HBOT with understanding [Patient educated on the risks of CONSUMING ALCOHOL prior to HBOT with understanding] : Patient educated on the risks of CONSUMING ALCOHOL prior to HBOT with understanding [100% Cotton] : 100% cotton [Empty all pockets] : empty all pockets [No hair oils, wigs, hairpieces, pins] : no hair oils, wigs, hairpieces, pins  [Pre tx medications] : pre tx medications  [No make-up, creams] : no make-up, creams  [No jewelry] : no jewelry  [No matches, cigarettes, lighters] : no matches, cigarettes, lighters  [Hearing aid removed] : hearing aid removed [Dentures removed] : dentures removed [Ground bracelet on pt's wrist] : ground bracelet on pt's wrist  [Contacts removed] : contacts removed  [Remove nail polish] : remove nail polish  [No reading material] : no reading material  [Bra, undergarments removed] : bra, undergarments removed  [No contraindicated dressings] : no contraindicated dressings [Ground Wire - VISUAL Verification - Intact/Free of Obstruction] : Ground Wire - VISUAL Verification - Intact/Free of Obstruction  [Ground Continuity - Verified < 1 ohm w/ Wrist Strap Channing] : Ground Continuity - Verified < 1 ohm w/ Wrist Strap Channing [Clear all fields] : clear all fields [Number: ___] : Number: [unfilled] [Diagnosis: ___] : Diagnosis: [unfilled] [____] : Post-Dive: Time - [unfilled] [___] : Post-Dive: Value - [unfilled] mg/dL [] : No [FreeTextEntry3] : 90 [FreeTextEntry5] : 3232 [FreeTextEntry7] : 1724 [FreeTextEntry9] : 9267 [de-identified] : 1545 [de-identified] : 108mins

## 2021-03-03 ENCOUNTER — APPOINTMENT (OUTPATIENT)
Dept: HYPERBARIC MEDICINE | Facility: HOSPITAL | Age: 71
End: 2021-03-03

## 2021-03-03 NOTE — PROCEDURE
[Outpatient] : Outpatient [Ambulatory] : Patient is ambulatory. [Wheelchair] : wheelchair [THIS CHAMBER HAS BEEN CLEANED / DISINFECTED] : This chamber has been cleaned / disinfected according to local and hospital policy and procedure prior to this treatment. [Patient demonstrated and verbalized proper technique for using air break mask] : Patient demonstrated and verbalized proper technique for using air break mask [Patient educated on the risks of SMOKING prior to HBOT with understanding] : Patient educated on the risks of SMOKING prior to HBOT with understanding [Patient educated on the risks of CONSUMING ALCOHOL prior to HBOT with understanding] : Patient educated on the risks of CONSUMING ALCOHOL prior to HBOT with understanding [100% Cotton] : 100% cotton [Empty all pockets] : empty all pockets [No hair oils, wigs, hairpieces, pins] : no hair oils, wigs, hairpieces, pins  [Pre tx medications] : pre tx medications  [No make-up, creams] : no make-up, creams  [No jewelry] : no jewelry  [No matches, cigarettes, lighters] : no matches, cigarettes, lighters  [Hearing aid removed] : hearing aid removed [Dentures removed] : dentures removed [Ground bracelet on pt's wrist] : ground bracelet on pt's wrist  [Contacts removed] : contacts removed  [Remove nail polish] : remove nail polish  [No reading material] : no reading material  [Bra, undergarments removed] : bra, undergarments removed  [No contraindicated dressings] : no contraindicated dressings [Ground Wire - VISUAL Verification - Intact/Free of Obstruction] : Ground Wire - VISUAL Verification - Intact/Free of Obstruction  [Ground Continuity - Verified < 1 ohm w/ Wrist Strap Channing] : Ground Continuity - Verified < 1 ohm w/ Wrist Strap Channing [Number: ___] : Number: [unfilled] [Diagnosis: ___] : Diagnosis: [unfilled] [____] : Post-Dive: Time - [unfilled] [___] : Post-Dive: Value - [unfilled] mg/dL [Clear all fields] : clear all fields [] : No [FreeTextEntry3] : 90 [FreeTextEntry5] : 6833 [FreeTextEntry7] : 8272 [FreeTextEntry9] : 1164 [de-identified] : 0895 [de-identified] : 108 minutes

## 2021-03-03 NOTE — ADDENDUM
[FreeTextEntry1] : PT DESCENDED TO 2.0 EULALIO @ 1.75 PSI/MIN WITHOUT INCIDENT IN CHAMBER #3\par PT RESTING AT TX DEPTH WITH VISIBLE CHEST RISE AND FALL OBSERVED CHAMBERSIDE \par PT ASCENDED FROM TX DEPTH WITHOUT INCIDENT \par PT TOLERATED TX WELL \par

## 2021-03-04 ENCOUNTER — APPOINTMENT (OUTPATIENT)
Dept: HYPERBARIC MEDICINE | Facility: HOSPITAL | Age: 71
End: 2021-03-04
Payer: MEDICARE

## 2021-03-04 ENCOUNTER — OUTPATIENT (OUTPATIENT)
Dept: OUTPATIENT SERVICES | Facility: HOSPITAL | Age: 71
LOS: 1 days | Discharge: HOME | End: 2021-03-04
Payer: MEDICARE

## 2021-03-04 VITALS
HEART RATE: 72 BPM | RESPIRATION RATE: 18 BRPM | DIASTOLIC BLOOD PRESSURE: 59 MMHG | TEMPERATURE: 97.3 F | SYSTOLIC BLOOD PRESSURE: 140 MMHG | OXYGEN SATURATION: 95 %

## 2021-03-04 VITALS
TEMPERATURE: 97.1 F | DIASTOLIC BLOOD PRESSURE: 67 MMHG | RESPIRATION RATE: 18 BRPM | OXYGEN SATURATION: 98 % | SYSTOLIC BLOOD PRESSURE: 147 MMHG | HEART RATE: 62 BPM

## 2021-03-04 DIAGNOSIS — Z86.69 PERSONAL HISTORY OF OTHER DISEASES OF THE NERVOUS SYSTEM AND SENSE ORGANS: Chronic | ICD-10-CM

## 2021-03-04 DIAGNOSIS — E11.621 TYPE 2 DIABETES MELLITUS WITH FOOT ULCER: ICD-10-CM

## 2021-03-04 DIAGNOSIS — L97.416 NON-PRESSURE CHRONIC ULCER OF RIGHT HEEL AND MIDFOOT WITH BONE INVOLVEMENT WITHOUT EVIDENCE OF NECROSIS: ICD-10-CM

## 2021-03-04 DIAGNOSIS — Z98.890 OTHER SPECIFIED POSTPROCEDURAL STATES: Chronic | ICD-10-CM

## 2021-03-04 DIAGNOSIS — M75.00 ADHESIVE CAPSULITIS OF UNSPECIFIED SHOULDER: Chronic | ICD-10-CM

## 2021-03-04 DIAGNOSIS — S02.91XA UNSPECIFIED FRACTURE OF SKULL, INITIAL ENCOUNTER FOR CLOSED FRACTURE: Chronic | ICD-10-CM

## 2021-03-04 DIAGNOSIS — Z79.4 LONG TERM (CURRENT) USE OF INSULIN: ICD-10-CM

## 2021-03-04 PROCEDURE — G0277: CPT

## 2021-03-04 PROCEDURE — 99183 HYPERBARIC OXYGEN THERAPY: CPT

## 2021-03-04 PROCEDURE — 82962 GLUCOSE BLOOD TEST: CPT

## 2021-03-05 ENCOUNTER — APPOINTMENT (OUTPATIENT)
Dept: HYPERBARIC MEDICINE | Facility: HOSPITAL | Age: 71
End: 2021-03-05
Payer: MEDICARE

## 2021-03-05 ENCOUNTER — OUTPATIENT (OUTPATIENT)
Dept: OUTPATIENT SERVICES | Facility: HOSPITAL | Age: 71
LOS: 1 days | Discharge: HOME | End: 2021-03-05
Payer: MEDICARE

## 2021-03-05 VITALS
OXYGEN SATURATION: 96 % | SYSTOLIC BLOOD PRESSURE: 127 MMHG | WEIGHT: 293 LBS | BODY MASS INDEX: 41.02 KG/M2 | RESPIRATION RATE: 16 BRPM | TEMPERATURE: 97.5 F | DIASTOLIC BLOOD PRESSURE: 66 MMHG | HEART RATE: 78 BPM | HEIGHT: 71 IN

## 2021-03-05 DIAGNOSIS — Z98.890 OTHER SPECIFIED POSTPROCEDURAL STATES: Chronic | ICD-10-CM

## 2021-03-05 DIAGNOSIS — Z86.69 PERSONAL HISTORY OF OTHER DISEASES OF THE NERVOUS SYSTEM AND SENSE ORGANS: Chronic | ICD-10-CM

## 2021-03-05 DIAGNOSIS — S02.91XA UNSPECIFIED FRACTURE OF SKULL, INITIAL ENCOUNTER FOR CLOSED FRACTURE: Chronic | ICD-10-CM

## 2021-03-05 DIAGNOSIS — M75.00 ADHESIVE CAPSULITIS OF UNSPECIFIED SHOULDER: Chronic | ICD-10-CM

## 2021-03-05 DIAGNOSIS — E11.621 TYPE 2 DIABETES MELLITUS WITH FOOT ULCER: ICD-10-CM

## 2021-03-05 LAB — SARS-COV-2 RNA SPEC QL NAA+PROBE: SIGNIFICANT CHANGE UP

## 2021-03-05 PROCEDURE — 15275 SKIN SUB GRAFT FACE/NK/HF/G: CPT

## 2021-03-05 PROCEDURE — U0005: CPT

## 2021-03-05 PROCEDURE — U0003: CPT

## 2021-03-05 NOTE — PROCEDURE
[Saline] : saline [Fenestrated] : fenestrated [Hydrated with saline] : hydrated with saline [Apligraf] : apligraf [____ % was used] : and [unfilled] % was used [____ % was discarded] : and [unfilled] % was discarded [FreeTextEntry1] : Adaptic touch, Alginate, dry dressing, ACE wrap.  [] : No [FreeTextEntry9] : 7655 [de-identified] : Right plantar DFU, clean and granular. [de-identified] : BOBBI Luna [de-identified] : None [FreeTextEntry6] : Right plantar DFU [FreeTextEntry7] : Right plantar DFU [de-identified] : Lidocaine cream [de-identified] : 0cc [de-identified] : None

## 2021-03-05 NOTE — ASSESSMENT
[Verbal] : Verbal [Demo] : Demo [Patient] : Patient [Fair - mild discomfort, physical impairment, low acceptance] : Fair - mild discomfort, physical impairment, low acceptance [Needs reinforcement] : needs reinforcement [Dressing changes] : dressing changes [Foot Care] : foot care [Skin Care] : skin care [Pressure relief] : pressure relief [Signs and symptoms of infection] : sign and symptoms of infection [Venous Disease] : venous disease [Nutrition] : nutrition [How and When to Call] : how and when to call [Hyperbaric Therapy] : hyperbaric therapy [Off-loading] : off-loading [Compression Therapy] : compression therapy [Home Health] : home health [Patient responsibility to plan of care] : patient responsibility to plan of care [Glycemic Control] : glycemic control [Stable] : stable [Home] : Home [Wheelchair] : Wheelchair [Not Applicable - Long Term Care/Home Health Agency] : Long Term Care/Home Health Agency: Not Applicable [] : No [FreeTextEntry2] : Infection Prevention\par Promote Skin Integrity\par Offloading\par Elevation\par Compression Compliance\par Low Na+ Diet\par Maintain acceptable levels of pain\par Demonstrates use of both pharmacological and nonpharmacological pain management interventions\par Encourage Glycemic Control\par Weight reduction Strategies\par HBOT\par  [FreeTextEntry4] : Covid 19 PCR obtained today\par F/U 1 week for assessment and daily for HBOT\par Preauthorization for sharps debridement and Apligraf #4 submitted for next assessment \par MRI of the Right Foot approved, pt scheduling pending

## 2021-03-05 NOTE — PROCEDURE
[Outpatient] : Outpatient [Ambulatory] : Patient is ambulatory. [Wheelchair] : wheelchair [THIS CHAMBER HAS BEEN CLEANED / DISINFECTED] : This chamber has been cleaned / disinfected according to local and hospital policy and procedure prior to this treatment. [Patient demonstrated and verbalized proper technique for using air break mask] : Patient demonstrated and verbalized proper technique for using air break mask [Patient educated on the risks of SMOKING prior to HBOT with understanding] : Patient educated on the risks of SMOKING prior to HBOT with understanding [Patient educated on the risks of CONSUMING ALCOHOL prior to HBOT with understanding] : Patient educated on the risks of CONSUMING ALCOHOL prior to HBOT with understanding [100% Cotton] : 100% cotton [Empty all pockets] : empty all pockets [No hair oils, wigs, hairpieces, pins] : no hair oils, wigs, hairpieces, pins  [Pre tx medications] : pre tx medications  [No make-up, creams] : no make-up, creams  [No jewelry] : no jewelry  [No matches, cigarettes, lighters] : no matches, cigarettes, lighters  [Hearing aid removed] : hearing aid removed [Dentures removed] : dentures removed [Ground bracelet on pt's wrist] : ground bracelet on pt's wrist  [Contacts removed] : contacts removed  [Remove nail polish] : remove nail polish  [No reading material] : no reading material  [Bra, undergarments removed] : bra, undergarments removed  [No contraindicated dressings] : no contraindicated dressings [Ground Wire - VISUAL Verification - Intact/Free of Obstruction] : Ground Wire - VISUAL Verification - Intact/Free of Obstruction  [Ground Continuity - Verified < 1 ohm w/ Wrist Strap Channing] : Ground Continuity - Verified < 1 ohm w/ Wrist Strap Channing [Number: ___] : Number: [unfilled] [Diagnosis: ___] : Diagnosis: [unfilled] [____] : Post-Dive: Time - [unfilled] [___] : Post-Dive: Value - [unfilled] mg/dL [Clear all fields] : clear all fields [] : No [FreeTextEntry3] : 90 [FreeTextEntry5] : 4755 [FreeTextEntry7] : 2763 [FreeTextEntry9] : 7818 [de-identified] : 7075 [de-identified] : 108 MIN

## 2021-03-05 NOTE — ADDENDUM
[FreeTextEntry1] : PT DESCENDED TO 2.0 EULALIO @ 1.75 PSI/MIN WITHOUT INCIDENT IN CHAMBER # 3. PT'S RESTING AT TX DEPTH WITH LEGS ELEVATED. CHEST RISE AND FALL OBSERVED CHAMBER SIDE.\par PT ASCENDED FROM TX DEPTH WITHOUT INCIDENT. PT TOLERATED TX WELL.\par PT REMINDED OF ASSESSMENT TOMORROW (3/5/21) WITH APPROPRIATE ARRIVAL TIME.

## 2021-03-05 NOTE — REASON FOR VISIT
[Apligraf] : apligraf [Other: _____] : [unfilled] [FreeTextEntry5] : Right Plantar DFU 1st met [FreeTextEntry4] : Right plantar DFU is clean, base is covered with granulation tissue, no drainage, no acute infection, S/P two Apligraft. \par .  [FreeTextEntry3] : Right plantar DFU [FreeTextEntry6] : Right plantar DFU [FreeTextEntry7] : Right plantar DFU

## 2021-03-06 ENCOUNTER — OUTPATIENT (OUTPATIENT)
Dept: OUTPATIENT SERVICES | Facility: HOSPITAL | Age: 71
LOS: 1 days | Discharge: HOME | End: 2021-03-06
Payer: MEDICARE

## 2021-03-06 ENCOUNTER — APPOINTMENT (OUTPATIENT)
Dept: HYPERBARIC MEDICINE | Facility: HOSPITAL | Age: 71
End: 2021-03-06
Payer: MEDICARE

## 2021-03-06 VITALS
TEMPERATURE: 97.3 F | SYSTOLIC BLOOD PRESSURE: 140 MMHG | RESPIRATION RATE: 20 BRPM | HEART RATE: 66 BPM | DIASTOLIC BLOOD PRESSURE: 59 MMHG | OXYGEN SATURATION: 100 %

## 2021-03-06 VITALS
HEART RATE: 78 BPM | DIASTOLIC BLOOD PRESSURE: 63 MMHG | SYSTOLIC BLOOD PRESSURE: 149 MMHG | OXYGEN SATURATION: 100 % | TEMPERATURE: 98.1 F | RESPIRATION RATE: 20 BRPM

## 2021-03-06 DIAGNOSIS — E11.621 TYPE 2 DIABETES MELLITUS WITH FOOT ULCER: ICD-10-CM

## 2021-03-06 DIAGNOSIS — Z98.890 OTHER SPECIFIED POSTPROCEDURAL STATES: Chronic | ICD-10-CM

## 2021-03-06 DIAGNOSIS — Z86.69 PERSONAL HISTORY OF OTHER DISEASES OF THE NERVOUS SYSTEM AND SENSE ORGANS: Chronic | ICD-10-CM

## 2021-03-06 DIAGNOSIS — S02.91XA UNSPECIFIED FRACTURE OF SKULL, INITIAL ENCOUNTER FOR CLOSED FRACTURE: Chronic | ICD-10-CM

## 2021-03-06 DIAGNOSIS — L97.416 NON-PRESSURE CHRONIC ULCER OF RIGHT HEEL AND MIDFOOT WITH BONE INVOLVEMENT WITHOUT EVIDENCE OF NECROSIS: ICD-10-CM

## 2021-03-06 DIAGNOSIS — Z79.4 LONG TERM (CURRENT) USE OF INSULIN: ICD-10-CM

## 2021-03-06 DIAGNOSIS — M75.00 ADHESIVE CAPSULITIS OF UNSPECIFIED SHOULDER: Chronic | ICD-10-CM

## 2021-03-06 PROCEDURE — 99183 HYPERBARIC OXYGEN THERAPY: CPT

## 2021-03-06 PROCEDURE — 82962 GLUCOSE BLOOD TEST: CPT

## 2021-03-06 PROCEDURE — G0277: CPT

## 2021-03-06 NOTE — ADDENDUM
[FreeTextEntry1] : Pt descended to 2.0 EULALIO @ 1.75 PSI/MIN without incident in chamber #4.\par Pt resting comfortably @ depth, with equal chest rise observed throughout tx.\par Pt ascended from 2.0 EULALIO @ 1.75 PSI/MIN without incident in chamber #4.\par Pt tolerated treatment well.\par

## 2021-03-06 NOTE — PROCEDURE
[Outpatient] : Outpatient [Wheelchair] : wheelchair [THIS CHAMBER HAS BEEN CLEANED / DISINFECTED] : This chamber has been cleaned / disinfected according to local and hospital policy and procedure prior to this treatment. [Patient demonstrated and verbalized proper technique for using air break mask] : Patient demonstrated and verbalized proper technique for using air break mask [Patient educated on the risks of SMOKING prior to HBOT with understanding] : Patient educated on the risks of SMOKING prior to HBOT with understanding [Patient educated on the risks of CONSUMING ALCOHOL prior to HBOT with understanding] : Patient educated on the risks of CONSUMING ALCOHOL prior to HBOT with understanding [100% Cotton] : 100% cotton [Empty all pockets] : empty all pockets [No hair oils, wigs, hairpieces, pins] : no hair oils, wigs, hairpieces, pins  [Pre tx medications] : pre tx medications  [No make-up, creams] : no make-up, creams  [No jewelry] : no jewelry  [No matches, cigarettes, lighters] : no matches, cigarettes, lighters  [Hearing aid removed] : hearing aid removed [Dentures removed] : dentures removed [Ground bracelet on pt's wrist] : ground bracelet on pt's wrist  [Contacts removed] : contacts removed  [Remove nail polish] : remove nail polish  [No reading material] : no reading material  [Bra, undergarments removed] : bra, undergarments removed  [No contraindicated dressings] : no contraindicated dressings [Ground Wire - VISUAL Verification - Intact/Free of Obstruction] : Ground Wire - VISUAL Verification - Intact/Free of Obstruction  [Ground Continuity - Verified < 1 ohm w/ Wrist Strap Channing] : Ground Continuity - Verified < 1 ohm w/ Wrist Strap Channing [Clear all fields] : clear all fields [Number: ___] : Number: [unfilled] [Diagnosis: ___] : Diagnosis: [unfilled] [____] : Post-Dive: Time - [unfilled] [___] : Post-Dive: Value - [unfilled] mg/dL [] : No [FreeTextEntry3] : 90 [FreeTextEntry5] : 0126 [FreeTextEntry7] : 5317 [FreeTextEntry9] : 1549 [de-identified] : 4513 [de-identified] : 108mins

## 2021-03-07 DIAGNOSIS — Z79.4 LONG TERM (CURRENT) USE OF INSULIN: ICD-10-CM

## 2021-03-07 DIAGNOSIS — L97.416 NON-PRESSURE CHRONIC ULCER OF RIGHT HEEL AND MIDFOOT WITH BONE INVOLVEMENT WITHOUT EVIDENCE OF NECROSIS: ICD-10-CM

## 2021-03-07 DIAGNOSIS — Z20.822 CONTACT WITH AND (SUSPECTED) EXPOSURE TO COVID-19: ICD-10-CM

## 2021-03-07 DIAGNOSIS — Z79.82 LONG TERM (CURRENT) USE OF ASPIRIN: ICD-10-CM

## 2021-03-07 DIAGNOSIS — Z79.899 OTHER LONG TERM (CURRENT) DRUG THERAPY: ICD-10-CM

## 2021-03-07 DIAGNOSIS — Z82.5 FAMILY HISTORY OF ASTHMA AND OTHER CHRONIC LOWER RESPIRATORY DISEASES: ICD-10-CM

## 2021-03-07 DIAGNOSIS — E11.40 TYPE 2 DIABETES MELLITUS WITH DIABETIC NEUROPATHY, UNSPECIFIED: ICD-10-CM

## 2021-03-07 DIAGNOSIS — Z88.0 ALLERGY STATUS TO PENICILLIN: ICD-10-CM

## 2021-03-07 DIAGNOSIS — Z98.890 OTHER SPECIFIED POSTPROCEDURAL STATES: ICD-10-CM

## 2021-03-07 DIAGNOSIS — Z83.3 FAMILY HISTORY OF DIABETES MELLITUS: ICD-10-CM

## 2021-03-07 DIAGNOSIS — E66.01 MORBID (SEVERE) OBESITY DUE TO EXCESS CALORIES: ICD-10-CM

## 2021-03-07 DIAGNOSIS — Z98.49 CATARACT EXTRACTION STATUS, UNSPECIFIED EYE: ICD-10-CM

## 2021-03-07 DIAGNOSIS — E11.621 TYPE 2 DIABETES MELLITUS WITH FOOT ULCER: ICD-10-CM

## 2021-03-08 ENCOUNTER — APPOINTMENT (OUTPATIENT)
Dept: HYPERBARIC MEDICINE | Facility: HOSPITAL | Age: 71
End: 2021-03-08
Payer: MEDICARE

## 2021-03-08 ENCOUNTER — OUTPATIENT (OUTPATIENT)
Dept: OUTPATIENT SERVICES | Facility: HOSPITAL | Age: 71
LOS: 1 days | Discharge: HOME | End: 2021-03-08
Payer: MEDICARE

## 2021-03-08 VITALS
OXYGEN SATURATION: 100 % | HEART RATE: 64 BPM | SYSTOLIC BLOOD PRESSURE: 153 MMHG | DIASTOLIC BLOOD PRESSURE: 58 MMHG | TEMPERATURE: 97 F | RESPIRATION RATE: 18 BRPM

## 2021-03-08 VITALS
HEART RATE: 85 BPM | SYSTOLIC BLOOD PRESSURE: 138 MMHG | DIASTOLIC BLOOD PRESSURE: 62 MMHG | RESPIRATION RATE: 18 BRPM | TEMPERATURE: 97.3 F | OXYGEN SATURATION: 96 %

## 2021-03-08 DIAGNOSIS — Z86.69 PERSONAL HISTORY OF OTHER DISEASES OF THE NERVOUS SYSTEM AND SENSE ORGANS: Chronic | ICD-10-CM

## 2021-03-08 DIAGNOSIS — M75.00 ADHESIVE CAPSULITIS OF UNSPECIFIED SHOULDER: Chronic | ICD-10-CM

## 2021-03-08 DIAGNOSIS — Z98.890 OTHER SPECIFIED POSTPROCEDURAL STATES: Chronic | ICD-10-CM

## 2021-03-08 DIAGNOSIS — E11.621 TYPE 2 DIABETES MELLITUS WITH FOOT ULCER: ICD-10-CM

## 2021-03-08 DIAGNOSIS — Z79.4 LONG TERM (CURRENT) USE OF INSULIN: ICD-10-CM

## 2021-03-08 DIAGNOSIS — L97.416 NON-PRESSURE CHRONIC ULCER OF RIGHT HEEL AND MIDFOOT WITH BONE INVOLVEMENT WITHOUT EVIDENCE OF NECROSIS: ICD-10-CM

## 2021-03-08 DIAGNOSIS — S02.91XA UNSPECIFIED FRACTURE OF SKULL, INITIAL ENCOUNTER FOR CLOSED FRACTURE: Chronic | ICD-10-CM

## 2021-03-08 PROCEDURE — G0277: CPT

## 2021-03-08 PROCEDURE — 99183 HYPERBARIC OXYGEN THERAPY: CPT

## 2021-03-08 PROCEDURE — 82962 GLUCOSE BLOOD TEST: CPT

## 2021-03-08 NOTE — PROCEDURE
[Outpatient] : Outpatient [Wheelchair] : wheelchair [THIS CHAMBER HAS BEEN CLEANED / DISINFECTED] : This chamber has been cleaned / disinfected according to local and hospital policy and procedure prior to this treatment. [Patient demonstrated and verbalized proper technique for using air break mask] : Patient demonstrated and verbalized proper technique for using air break mask [Patient educated on the risks of SMOKING prior to HBOT with understanding] : Patient educated on the risks of SMOKING prior to HBOT with understanding [Patient educated on the risks of CONSUMING ALCOHOL prior to HBOT with understanding] : Patient educated on the risks of CONSUMING ALCOHOL prior to HBOT with understanding [100% Cotton] : 100% cotton [Empty all pockets] : empty all pockets [No hair oils, wigs, hairpieces, pins] : no hair oils, wigs, hairpieces, pins  [Pre tx medications] : pre tx medications  [No make-up, creams] : no make-up, creams  [No jewelry] : no jewelry  [No matches, cigarettes, lighters] : no matches, cigarettes, lighters  [Hearing aid removed] : hearing aid removed [Dentures removed] : dentures removed [Ground bracelet on pt's wrist] : ground bracelet on pt's wrist  [Contacts removed] : contacts removed  [Remove nail polish] : remove nail polish  [No reading material] : no reading material  [Bra, undergarments removed] : bra, undergarments removed  [No contraindicated dressings] : no contraindicated dressings [Ground Wire - VISUAL Verification - Intact/Free of Obstruction] : Ground Wire - VISUAL Verification - Intact/Free of Obstruction  [Ground Continuity - Verified < 1 ohm w/ Wrist Strap Channing] : Ground Continuity - Verified < 1 ohm w/ Wrist Strap Channing [Number: ___] : Number: [unfilled] [Diagnosis: ___] : Diagnosis: [unfilled] [____] : Post-Dive: Time - [unfilled] [___] : Post-Dive: Value - [unfilled] mg/dL [Clear all fields] : clear all fields [] : No [FreeTextEntry3] : 90 [FreeTextEntry5] : 0833 [FreeTextEntry7] : 4126 [FreeTextEntry9] : 8182 [de-identified] : 6461 [de-identified] : 108 mins

## 2021-03-08 NOTE — ADDENDUM
[FreeTextEntry1] : DRAINAGE NOTED ON PT'S DRESSING PRIOR TO DESCENT. RN NOTIFIED. PT CLEARED TO DIVE.\par PT DESCENDED TO 2.0 EULALIO @ 1.75 PSI/MIN WITHOUT INCIDENT IN CHAMBER # 4. PT'S RESTING AT TX DEPTH WITH CHEST RISE AND FALL OBSERVED CHAMBER SIDE. \par CARE TRANSFERRED TO TREATING TECH @ 1200 PM\par PT ASCENDED FROM TX DEPTH TO SURFACE WITHOUT INCIDENT.\par PT TOLERATED HBOT WITHOUT INCIDENT.\par PT RECEIVED OUTER DRESSING CHANGE VIA NURSE POST-HBOT.

## 2021-03-09 ENCOUNTER — OUTPATIENT (OUTPATIENT)
Dept: OUTPATIENT SERVICES | Facility: HOSPITAL | Age: 71
LOS: 1 days | Discharge: HOME | End: 2021-03-09
Payer: MEDICARE

## 2021-03-09 ENCOUNTER — APPOINTMENT (OUTPATIENT)
Dept: HYPERBARIC MEDICINE | Facility: HOSPITAL | Age: 71
End: 2021-03-09
Payer: MEDICARE

## 2021-03-09 VITALS
OXYGEN SATURATION: 96 % | RESPIRATION RATE: 20 BRPM | SYSTOLIC BLOOD PRESSURE: 140 MMHG | TEMPERATURE: 97.3 F | HEART RATE: 67 BPM | DIASTOLIC BLOOD PRESSURE: 61 MMHG

## 2021-03-09 VITALS
HEART RATE: 59 BPM | TEMPERATURE: 97.1 F | OXYGEN SATURATION: 99 % | SYSTOLIC BLOOD PRESSURE: 141 MMHG | DIASTOLIC BLOOD PRESSURE: 68 MMHG | RESPIRATION RATE: 16 BRPM

## 2021-03-09 DIAGNOSIS — M75.00 ADHESIVE CAPSULITIS OF UNSPECIFIED SHOULDER: Chronic | ICD-10-CM

## 2021-03-09 DIAGNOSIS — Z98.890 OTHER SPECIFIED POSTPROCEDURAL STATES: Chronic | ICD-10-CM

## 2021-03-09 DIAGNOSIS — S02.91XA UNSPECIFIED FRACTURE OF SKULL, INITIAL ENCOUNTER FOR CLOSED FRACTURE: Chronic | ICD-10-CM

## 2021-03-09 DIAGNOSIS — Z86.69 PERSONAL HISTORY OF OTHER DISEASES OF THE NERVOUS SYSTEM AND SENSE ORGANS: Chronic | ICD-10-CM

## 2021-03-09 DIAGNOSIS — E11.621 TYPE 2 DIABETES MELLITUS WITH FOOT ULCER: ICD-10-CM

## 2021-03-09 PROCEDURE — G0277: CPT

## 2021-03-09 PROCEDURE — 99183 HYPERBARIC OXYGEN THERAPY: CPT

## 2021-03-09 PROCEDURE — 82962 GLUCOSE BLOOD TEST: CPT

## 2021-03-10 ENCOUNTER — APPOINTMENT (OUTPATIENT)
Dept: HYPERBARIC MEDICINE | Facility: HOSPITAL | Age: 71
End: 2021-03-10

## 2021-03-11 ENCOUNTER — OUTPATIENT (OUTPATIENT)
Dept: OUTPATIENT SERVICES | Facility: HOSPITAL | Age: 71
LOS: 1 days | Discharge: HOME | End: 2021-03-11
Payer: MEDICARE

## 2021-03-11 ENCOUNTER — APPOINTMENT (OUTPATIENT)
Dept: HYPERBARIC MEDICINE | Facility: HOSPITAL | Age: 71
End: 2021-03-11
Payer: MEDICARE

## 2021-03-11 VITALS
SYSTOLIC BLOOD PRESSURE: 141 MMHG | TEMPERATURE: 97.4 F | OXYGEN SATURATION: 98 % | RESPIRATION RATE: 18 BRPM | DIASTOLIC BLOOD PRESSURE: 58 MMHG | HEART RATE: 71 BPM

## 2021-03-11 VITALS
DIASTOLIC BLOOD PRESSURE: 65 MMHG | OXYGEN SATURATION: 97 % | HEART RATE: 62 BPM | TEMPERATURE: 97 F | RESPIRATION RATE: 20 BRPM | SYSTOLIC BLOOD PRESSURE: 150 MMHG

## 2021-03-11 DIAGNOSIS — L97.416 NON-PRESSURE CHRONIC ULCER OF RIGHT HEEL AND MIDFOOT WITH BONE INVOLVEMENT WITHOUT EVIDENCE OF NECROSIS: ICD-10-CM

## 2021-03-11 DIAGNOSIS — M75.00 ADHESIVE CAPSULITIS OF UNSPECIFIED SHOULDER: Chronic | ICD-10-CM

## 2021-03-11 DIAGNOSIS — Z86.69 PERSONAL HISTORY OF OTHER DISEASES OF THE NERVOUS SYSTEM AND SENSE ORGANS: Chronic | ICD-10-CM

## 2021-03-11 DIAGNOSIS — Z98.890 OTHER SPECIFIED POSTPROCEDURAL STATES: Chronic | ICD-10-CM

## 2021-03-11 DIAGNOSIS — E11.621 TYPE 2 DIABETES MELLITUS WITH FOOT ULCER: ICD-10-CM

## 2021-03-11 DIAGNOSIS — S02.91XA UNSPECIFIED FRACTURE OF SKULL, INITIAL ENCOUNTER FOR CLOSED FRACTURE: Chronic | ICD-10-CM

## 2021-03-11 DIAGNOSIS — Z79.4 LONG TERM (CURRENT) USE OF INSULIN: ICD-10-CM

## 2021-03-11 PROCEDURE — 99183 HYPERBARIC OXYGEN THERAPY: CPT

## 2021-03-11 PROCEDURE — 82962 GLUCOSE BLOOD TEST: CPT

## 2021-03-11 PROCEDURE — G0277: CPT

## 2021-03-11 NOTE — PROCEDURE
[Outpatient] : Outpatient [Ambulatory] : Patient is ambulatory. [Wheelchair] : wheelchair [THIS CHAMBER HAS BEEN CLEANED / DISINFECTED] : This chamber has been cleaned / disinfected according to local and hospital policy and procedure prior to this treatment. [Patient demonstrated and verbalized proper technique for using air break mask] : Patient demonstrated and verbalized proper technique for using air break mask [Patient educated on the risks of SMOKING prior to HBOT with understanding] : Patient educated on the risks of SMOKING prior to HBOT with understanding [Patient educated on the risks of CONSUMING ALCOHOL prior to HBOT with understanding] : Patient educated on the risks of CONSUMING ALCOHOL prior to HBOT with understanding [100% Cotton] : 100% cotton [Empty all pockets] : empty all pockets [No hair oils, wigs, hairpieces, pins] : no hair oils, wigs, hairpieces, pins  [Pre tx medications] : pre tx medications  [No make-up, creams] : no make-up, creams  [No jewelry] : no jewelry  [No matches, cigarettes, lighters] : no matches, cigarettes, lighters  [Hearing aid removed] : hearing aid removed [Dentures removed] : dentures removed [Ground bracelet on pt's wrist] : ground bracelet on pt's wrist  [Contacts removed] : contacts removed  [Remove nail polish] : remove nail polish  [No reading material] : no reading material  [Bra, undergarments removed] : bra, undergarments removed  [No contraindicated dressings] : no contraindicated dressings [Ground Wire - VISUAL Verification - Intact/Free of Obstruction] : Ground Wire - VISUAL Verification - Intact/Free of Obstruction  [Ground Continuity - Verified < 1 ohm w/ Wrist Strap Channing] : Ground Continuity - Verified < 1 ohm w/ Wrist Strap Channing [Number: ___] : Number: [unfilled] [Diagnosis: ___] : Diagnosis: [unfilled] [____] : Post-Dive: Time - [unfilled] [___] : Post-Dive: Value - [unfilled] mg/dL [Clear all fields] : clear all fields [] : No [FreeTextEntry3] : 90 [FreeTextEntry5] : 7198 [FreeTextEntry7] : 9247 [FreeTextEntry9] : 4398 [de-identified] : 3085 [de-identified] : 108 minutes [de-identified] : FLOYD YEE  1/25/21

## 2021-03-11 NOTE — ADDENDUM
[FreeTextEntry1] : HBOT CONSENT SIGNED PRIOR TO DIVE AND PLACED IN BLACK BOX @ 1215 PM\par PT PREPARED FOR FIRST DIVE.\par PT'S PICC LINE MEASURED PRIOR TO DESCENT. \par PT RECEIVED DRESSING CHANGE PRIOR TO DESCENT.\par PT DESCENDED TO 2.0 EULALIO @ 1.75 PSI/MIN WITHOUT INCIDENT IN CHAMBER # 1. PT'S RESTING AT TX DEPTH WITH LEGS ELEVATED. CHEST RISE AND FALL OBSERVED CHAMBER SIDE.\par PT ASCENDED FROM TX DEPTH WITHOUT INCIDENT\par PTS PICC LINE MEASURED POST HBOT BY RN \par PT TOLERATED TX WELL

## 2021-03-12 ENCOUNTER — APPOINTMENT (OUTPATIENT)
Dept: PODIATRY | Facility: HOSPITAL | Age: 71
End: 2021-03-12
Payer: MEDICARE

## 2021-03-12 ENCOUNTER — APPOINTMENT (OUTPATIENT)
Dept: HYPERBARIC MEDICINE | Facility: HOSPITAL | Age: 71
End: 2021-03-12

## 2021-03-12 ENCOUNTER — OUTPATIENT (OUTPATIENT)
Dept: OUTPATIENT SERVICES | Facility: HOSPITAL | Age: 71
LOS: 1 days | Discharge: HOME | End: 2021-03-12
Payer: MEDICARE

## 2021-03-12 VITALS
HEART RATE: 59 BPM | DIASTOLIC BLOOD PRESSURE: 54 MMHG | OXYGEN SATURATION: 97 % | HEIGHT: 71 IN | BODY MASS INDEX: 41.02 KG/M2 | RESPIRATION RATE: 20 BRPM | SYSTOLIC BLOOD PRESSURE: 148 MMHG | TEMPERATURE: 97.8 F | WEIGHT: 293 LBS

## 2021-03-12 DIAGNOSIS — E11.621 TYPE 2 DIABETES MELLITUS WITH FOOT ULCER: ICD-10-CM

## 2021-03-12 DIAGNOSIS — S02.91XA UNSPECIFIED FRACTURE OF SKULL, INITIAL ENCOUNTER FOR CLOSED FRACTURE: Chronic | ICD-10-CM

## 2021-03-12 DIAGNOSIS — Z98.890 OTHER SPECIFIED POSTPROCEDURAL STATES: Chronic | ICD-10-CM

## 2021-03-12 DIAGNOSIS — M75.00 ADHESIVE CAPSULITIS OF UNSPECIFIED SHOULDER: Chronic | ICD-10-CM

## 2021-03-12 DIAGNOSIS — Z86.69 PERSONAL HISTORY OF OTHER DISEASES OF THE NERVOUS SYSTEM AND SENSE ORGANS: Chronic | ICD-10-CM

## 2021-03-12 DIAGNOSIS — L97.416 NON-PRESSURE CHRONIC ULCER OF RIGHT HEEL AND MIDFOOT WITH BONE INVOLVEMENT WITHOUT EVIDENCE OF NECROSIS: ICD-10-CM

## 2021-03-12 LAB — SARS-COV-2 RNA SPEC QL NAA+PROBE: SIGNIFICANT CHANGE UP

## 2021-03-12 PROCEDURE — U0003: CPT

## 2021-03-12 PROCEDURE — G0463: CPT

## 2021-03-12 PROCEDURE — U0005: CPT

## 2021-03-12 PROCEDURE — 15275 SKIN SUB GRAFT FACE/NK/HF/G: CPT | Mod: 58

## 2021-03-13 ENCOUNTER — OUTPATIENT (OUTPATIENT)
Dept: OUTPATIENT SERVICES | Facility: HOSPITAL | Age: 71
LOS: 1 days | Discharge: HOME | End: 2021-03-13
Payer: MEDICARE

## 2021-03-13 ENCOUNTER — APPOINTMENT (OUTPATIENT)
Dept: HYPERBARIC MEDICINE | Facility: HOSPITAL | Age: 71
End: 2021-03-13
Payer: MEDICARE

## 2021-03-13 VITALS
OXYGEN SATURATION: 98 % | RESPIRATION RATE: 16 BRPM | DIASTOLIC BLOOD PRESSURE: 65 MMHG | HEART RATE: 59 BPM | SYSTOLIC BLOOD PRESSURE: 154 MMHG | TEMPERATURE: 98 F

## 2021-03-13 VITALS
HEART RATE: 90 BPM | RESPIRATION RATE: 16 BRPM | SYSTOLIC BLOOD PRESSURE: 148 MMHG | TEMPERATURE: 98 F | OXYGEN SATURATION: 98 % | DIASTOLIC BLOOD PRESSURE: 74 MMHG

## 2021-03-13 DIAGNOSIS — E11.621 TYPE 2 DIABETES MELLITUS WITH FOOT ULCER: ICD-10-CM

## 2021-03-13 DIAGNOSIS — L97.416 NON-PRESSURE CHRONIC ULCER OF RIGHT HEEL AND MIDFOOT WITH BONE INVOLVEMENT WITHOUT EVIDENCE OF NECROSIS: ICD-10-CM

## 2021-03-13 DIAGNOSIS — M75.00 ADHESIVE CAPSULITIS OF UNSPECIFIED SHOULDER: Chronic | ICD-10-CM

## 2021-03-13 DIAGNOSIS — Z86.69 PERSONAL HISTORY OF OTHER DISEASES OF THE NERVOUS SYSTEM AND SENSE ORGANS: Chronic | ICD-10-CM

## 2021-03-13 DIAGNOSIS — Z98.890 OTHER SPECIFIED POSTPROCEDURAL STATES: Chronic | ICD-10-CM

## 2021-03-13 DIAGNOSIS — S02.91XA UNSPECIFIED FRACTURE OF SKULL, INITIAL ENCOUNTER FOR CLOSED FRACTURE: Chronic | ICD-10-CM

## 2021-03-13 DIAGNOSIS — Z79.4 LONG TERM (CURRENT) USE OF INSULIN: ICD-10-CM

## 2021-03-13 PROCEDURE — 99183 HYPERBARIC OXYGEN THERAPY: CPT

## 2021-03-13 PROCEDURE — G0277: CPT

## 2021-03-13 PROCEDURE — 82962 GLUCOSE BLOOD TEST: CPT

## 2021-03-15 ENCOUNTER — OUTPATIENT (OUTPATIENT)
Dept: OUTPATIENT SERVICES | Facility: HOSPITAL | Age: 71
LOS: 1 days | Discharge: HOME | End: 2021-03-15
Payer: MEDICARE

## 2021-03-15 ENCOUNTER — APPOINTMENT (OUTPATIENT)
Dept: HYPERBARIC MEDICINE | Facility: HOSPITAL | Age: 71
End: 2021-03-15
Payer: MEDICARE

## 2021-03-15 VITALS
OXYGEN SATURATION: 99 % | RESPIRATION RATE: 20 BRPM | DIASTOLIC BLOOD PRESSURE: 84 MMHG | HEART RATE: 65 BPM | TEMPERATURE: 97 F | SYSTOLIC BLOOD PRESSURE: 157 MMHG

## 2021-03-15 VITALS
OXYGEN SATURATION: 98 % | RESPIRATION RATE: 20 BRPM | SYSTOLIC BLOOD PRESSURE: 156 MMHG | TEMPERATURE: 96.8 F | HEART RATE: 66 BPM | DIASTOLIC BLOOD PRESSURE: 66 MMHG

## 2021-03-15 DIAGNOSIS — L97.416 NON-PRESSURE CHRONIC ULCER OF RIGHT HEEL AND MIDFOOT WITH BONE INVOLVEMENT WITHOUT EVIDENCE OF NECROSIS: ICD-10-CM

## 2021-03-15 DIAGNOSIS — S02.91XA UNSPECIFIED FRACTURE OF SKULL, INITIAL ENCOUNTER FOR CLOSED FRACTURE: Chronic | ICD-10-CM

## 2021-03-15 DIAGNOSIS — M75.00 ADHESIVE CAPSULITIS OF UNSPECIFIED SHOULDER: Chronic | ICD-10-CM

## 2021-03-15 DIAGNOSIS — Z79.4 LONG TERM (CURRENT) USE OF INSULIN: ICD-10-CM

## 2021-03-15 DIAGNOSIS — Z98.890 OTHER SPECIFIED POSTPROCEDURAL STATES: Chronic | ICD-10-CM

## 2021-03-15 DIAGNOSIS — E11.621 TYPE 2 DIABETES MELLITUS WITH FOOT ULCER: ICD-10-CM

## 2021-03-15 DIAGNOSIS — Z86.69 PERSONAL HISTORY OF OTHER DISEASES OF THE NERVOUS SYSTEM AND SENSE ORGANS: Chronic | ICD-10-CM

## 2021-03-15 PROCEDURE — G0277: CPT

## 2021-03-15 PROCEDURE — 99183 HYPERBARIC OXYGEN THERAPY: CPT

## 2021-03-15 PROCEDURE — 82962 GLUCOSE BLOOD TEST: CPT

## 2021-03-16 ENCOUNTER — APPOINTMENT (OUTPATIENT)
Dept: HYPERBARIC MEDICINE | Facility: HOSPITAL | Age: 71
End: 2021-03-16
Payer: MEDICARE

## 2021-03-16 ENCOUNTER — OUTPATIENT (OUTPATIENT)
Dept: OUTPATIENT SERVICES | Facility: HOSPITAL | Age: 71
LOS: 1 days | Discharge: HOME | End: 2021-03-16
Payer: MEDICARE

## 2021-03-16 VITALS
TEMPERATURE: 97.3 F | HEART RATE: 61 BPM | RESPIRATION RATE: 18 BRPM | OXYGEN SATURATION: 98 % | DIASTOLIC BLOOD PRESSURE: 74 MMHG | SYSTOLIC BLOOD PRESSURE: 174 MMHG

## 2021-03-16 VITALS
DIASTOLIC BLOOD PRESSURE: 57 MMHG | SYSTOLIC BLOOD PRESSURE: 154 MMHG | HEART RATE: 65 BPM | RESPIRATION RATE: 20 BRPM | TEMPERATURE: 97 F | OXYGEN SATURATION: 97 %

## 2021-03-16 DIAGNOSIS — Z98.890 OTHER SPECIFIED POSTPROCEDURAL STATES: Chronic | ICD-10-CM

## 2021-03-16 DIAGNOSIS — L97.416 NON-PRESSURE CHRONIC ULCER OF RIGHT HEEL AND MIDFOOT WITH BONE INVOLVEMENT WITHOUT EVIDENCE OF NECROSIS: ICD-10-CM

## 2021-03-16 DIAGNOSIS — M75.00 ADHESIVE CAPSULITIS OF UNSPECIFIED SHOULDER: Chronic | ICD-10-CM

## 2021-03-16 DIAGNOSIS — Z86.69 PERSONAL HISTORY OF OTHER DISEASES OF THE NERVOUS SYSTEM AND SENSE ORGANS: Chronic | ICD-10-CM

## 2021-03-16 DIAGNOSIS — S02.91XA UNSPECIFIED FRACTURE OF SKULL, INITIAL ENCOUNTER FOR CLOSED FRACTURE: Chronic | ICD-10-CM

## 2021-03-16 DIAGNOSIS — E11.621 TYPE 2 DIABETES MELLITUS WITH FOOT ULCER: ICD-10-CM

## 2021-03-16 DIAGNOSIS — Z79.4 LONG TERM (CURRENT) USE OF INSULIN: ICD-10-CM

## 2021-03-16 PROCEDURE — 99183 HYPERBARIC OXYGEN THERAPY: CPT

## 2021-03-16 PROCEDURE — 82962 GLUCOSE BLOOD TEST: CPT

## 2021-03-16 PROCEDURE — G0277: CPT

## 2021-03-17 ENCOUNTER — APPOINTMENT (OUTPATIENT)
Dept: HYPERBARIC MEDICINE | Facility: HOSPITAL | Age: 71
End: 2021-03-17

## 2021-03-18 ENCOUNTER — OUTPATIENT (OUTPATIENT)
Dept: OUTPATIENT SERVICES | Facility: HOSPITAL | Age: 71
LOS: 1 days | Discharge: HOME | End: 2021-03-18
Payer: MEDICARE

## 2021-03-18 ENCOUNTER — APPOINTMENT (OUTPATIENT)
Dept: HYPERBARIC MEDICINE | Facility: HOSPITAL | Age: 71
End: 2021-03-18
Payer: MEDICARE

## 2021-03-18 VITALS
DIASTOLIC BLOOD PRESSURE: 61 MMHG | SYSTOLIC BLOOD PRESSURE: 152 MMHG | OXYGEN SATURATION: 98 % | TEMPERATURE: 97.8 F | HEART RATE: 61 BPM | RESPIRATION RATE: 18 BRPM

## 2021-03-18 VITALS
TEMPERATURE: 98.2 F | SYSTOLIC BLOOD PRESSURE: 162 MMHG | OXYGEN SATURATION: 96 % | RESPIRATION RATE: 20 BRPM | HEART RATE: 60 BPM | DIASTOLIC BLOOD PRESSURE: 72 MMHG

## 2021-03-18 DIAGNOSIS — Z79.4 LONG TERM (CURRENT) USE OF INSULIN: ICD-10-CM

## 2021-03-18 DIAGNOSIS — E11.621 TYPE 2 DIABETES MELLITUS WITH FOOT ULCER: ICD-10-CM

## 2021-03-18 DIAGNOSIS — S02.91XA UNSPECIFIED FRACTURE OF SKULL, INITIAL ENCOUNTER FOR CLOSED FRACTURE: Chronic | ICD-10-CM

## 2021-03-18 DIAGNOSIS — Z98.890 OTHER SPECIFIED POSTPROCEDURAL STATES: Chronic | ICD-10-CM

## 2021-03-18 DIAGNOSIS — Z86.69 PERSONAL HISTORY OF OTHER DISEASES OF THE NERVOUS SYSTEM AND SENSE ORGANS: Chronic | ICD-10-CM

## 2021-03-18 DIAGNOSIS — M75.00 ADHESIVE CAPSULITIS OF UNSPECIFIED SHOULDER: Chronic | ICD-10-CM

## 2021-03-18 DIAGNOSIS — L97.416 NON-PRESSURE CHRONIC ULCER OF RIGHT HEEL AND MIDFOOT WITH BONE INVOLVEMENT WITHOUT EVIDENCE OF NECROSIS: ICD-10-CM

## 2021-03-18 PROCEDURE — 99183 HYPERBARIC OXYGEN THERAPY: CPT

## 2021-03-18 PROCEDURE — G0277: CPT

## 2021-03-18 PROCEDURE — 82962 GLUCOSE BLOOD TEST: CPT

## 2021-03-18 NOTE — ASSESSMENT
[No change from previous assessment] : No change from previous assessment [Time MD/Provider assessed Patient:_______] : Time MD/Provider assessed Patient: [unfilled] [Patient prepared for dive] : Patient prepared for dive [Patient undergoing HBO treatment for __________] : Patient undergoing HBO treatment for [unfilled] [Patient descended without problem for 9 minutes] : Patient descended without problem for 9 minutes [No dizziness or thirst] :  No dizziness or thirst [No ear problems] : No ear problems [Vital signs stable] : Vital signs stable [Tolerating dive well] : Tolerating dive well [No Chest Pain, shortness of breath] : No Chest Pain, shortness of breath [Respiratory Rate Stable] : Respiratory Rate Stable [No chest pain, shortness of breath, or ear pain] :  No chest pain, shortness of breath, or ear pain  [Tolerated Ascent well] : Tolerated Ascent well [Vital Signs stable] : Vital Signs stable [A physician was present throughout the entire HBOT] : A physician was present throughout the entire HBOT [No] : No [Clinically Stable] : Clinically stable [Continue Treatment Plan] : Continue treatment plan [0] : 0 out of 10

## 2021-03-19 ENCOUNTER — APPOINTMENT (OUTPATIENT)
Dept: PODIATRY | Facility: HOSPITAL | Age: 71
End: 2021-03-19
Payer: MEDICARE

## 2021-03-19 ENCOUNTER — OUTPATIENT (OUTPATIENT)
Dept: OUTPATIENT SERVICES | Facility: HOSPITAL | Age: 71
LOS: 1 days | Discharge: HOME | End: 2021-03-19
Payer: MEDICARE

## 2021-03-19 VITALS
TEMPERATURE: 98.4 F | WEIGHT: 293 LBS | RESPIRATION RATE: 20 BRPM | SYSTOLIC BLOOD PRESSURE: 158 MMHG | OXYGEN SATURATION: 96 % | HEIGHT: 71 IN | HEART RATE: 62 BPM | DIASTOLIC BLOOD PRESSURE: 72 MMHG | BODY MASS INDEX: 41.02 KG/M2

## 2021-03-19 DIAGNOSIS — E11.621 TYPE 2 DIABETES MELLITUS WITH FOOT ULCER: ICD-10-CM

## 2021-03-19 DIAGNOSIS — Z98.890 OTHER SPECIFIED POSTPROCEDURAL STATES: Chronic | ICD-10-CM

## 2021-03-19 DIAGNOSIS — Z86.69 PERSONAL HISTORY OF OTHER DISEASES OF THE NERVOUS SYSTEM AND SENSE ORGANS: Chronic | ICD-10-CM

## 2021-03-19 DIAGNOSIS — M75.00 ADHESIVE CAPSULITIS OF UNSPECIFIED SHOULDER: Chronic | ICD-10-CM

## 2021-03-19 DIAGNOSIS — S02.91XA UNSPECIFIED FRACTURE OF SKULL, INITIAL ENCOUNTER FOR CLOSED FRACTURE: Chronic | ICD-10-CM

## 2021-03-19 LAB — SARS-COV-2 RNA SPEC QL NAA+PROBE: SIGNIFICANT CHANGE UP

## 2021-03-19 PROCEDURE — U0005: CPT

## 2021-03-19 PROCEDURE — 15275 SKIN SUB GRAFT FACE/NK/HF/G: CPT

## 2021-03-19 PROCEDURE — 15275 SKIN SUB GRAFT FACE/NK/HF/G: CPT | Mod: 58

## 2021-03-19 PROCEDURE — U0003: CPT

## 2021-03-19 NOTE — REASON FOR VISIT
[Apligraf] : apligraf [FreeTextEntry5] : Right Plantar Foot 1 st met [FreeTextEntry4] : DFU 3 plantar right  [FreeTextEntry3] : clean , granular

## 2021-03-19 NOTE — PROCEDURE
[Saline] : saline [Scalpel] : scalpel [Other: ___] : [unfilled] [Fenestrated] : fenestrated [Hydrated with saline] : hydrated with saline [Apligraf] : apligraf [____ % was used] : and [unfilled] % was used [____ % was discarded] : and [unfilled] % was discarded [FreeTextEntry1] : wound veil / alginate  [] : Yes [FreeTextEntry9] : 3899 [de-identified] : DFU 3 plantar right  [de-identified] : Juan Carlos [FreeTextEntry6] : DFU 3 plantar right  [FreeTextEntry7] : same [de-identified] : 0 [de-identified] : 2cc [de-identified] : kiet

## 2021-03-19 NOTE — PHYSICAL EXAM
[4 x 4] : 4 x 4  [Abdominal Pad] : Abdominal Pad [JVD] : no jugular venous distention  [Normal Breath Sounds] : Normal breath sounds [Normal Heart Sounds] : normal heart sounds [2+] : left 2+ [0] : left 0 [Ankle Swelling (On Exam)] : present [Ankle Swelling Bilaterally] : severe [Varicose Veins Of Lower Extremities] : bilaterally [Alert] : alert [Oriented to Person] : oriented to person [Calm] : calm [de-identified] : WD/WN in no acute distress. Morbidly obese. [de-identified] : WNL [de-identified] : CHRISL [de-identified] : WNL [de-identified] : Right plantar DFU is clean, base is red and viable, no drainage, no acute infection, periwound skin is intact with no cellulitis. [de-identified] : Circ neurovascular function WNL post Ace wraps, pt expressed comfort.\par Apligraf 44 sq cm\par 100% Used 0% Discarded \par [id number]\par exp: 03/16/2021\par Lot: [id number]\par pH 7.3-7.5\par  [FreeTextEntry1] : Plantar Foot 1 st met [FreeTextEntry2] : 4.0 [FreeTextEntry3] : 1.2 [FreeTextEntry4] : 0.1-0.2 [de-identified] : serosanguineous [de-identified] : 0.2-0.3 cm @ 8-9 o' clock [de-identified] : callus [de-identified] : 90-95% [de-identified] : 5-10% [FreeTextEntry5] : Post Debridement measurements- 4.1 x 1.3 x 0.2-0.3 [de-identified] : Apligraf 44 sq cm- 100% Used 0% Discarded [de-identified] :  to hold dressing in place [de-identified] : Wound Veil and Calcium Alginate [de-identified] : Cleansed with Normal saline\par Reconstituted in NS\par Lot: [id number]\par Exp: 05/01/2023\par \par  [TWNoteComboBox1] : Right [TWNoteComboBox4] : Small [TWNoteComboBox5] : No [TWNoteComboBox6] : Surgical [de-identified] : Yes [de-identified] : other [de-identified] : None [de-identified] : None [de-identified] : >75% [de-identified] : Yes [de-identified] : 2.5% Lidocaine Topical [TWNoteComboBox7] : Arlin [de-identified] : Application of skin substitute [de-identified] : Ace wraps [de-identified] : Weekly [de-identified] : Primary Dressing

## 2021-03-19 NOTE — REVIEW OF SYSTEMS
[FreeTextEntry1] : 5109 [Fever] : no fever [Chills] : no chills [Eye Pain] : no eye pain [Loss Of Hearing] : no hearing loss [Shortness Of Breath] : no shortness of breath [Abdominal Pain] : no abdominal pain [Skin Wound] : skin wound [Anxiety] : no anxiety [Easy Bleeding] : no tendency for easy bleeding [FreeTextEntry5] : HTN , morbid obesity  [de-identified] : DFU 3 plantar right 1st met  [de-identified] : IDDM with neuropathy  [de-identified] : KENNY

## 2021-03-19 NOTE — ASSESSMENT
[Verbal] : Verbal [Demo] : Demo [Patient] : Patient [Fair - mild discomfort, physical impairment, low acceptance] : Fair - mild discomfort, physical impairment, low acceptance [Needs reinforcement] : needs reinforcement [Dressing changes] : dressing changes [Foot Care] : foot care [Skin Care] : skin care [Pressure relief] : pressure relief [Signs and symptoms of infection] : sign and symptoms of infection [Venous Disease] : venous disease [Nutrition] : nutrition [How and When to Call] : how and when to call [Hyperbaric Therapy] : hyperbaric therapy [Off-loading] : off-loading [Compression Therapy] : compression therapy [Home Health] : home health [Patient responsibility to plan of care] : patient responsibility to plan of care [Glycemic Control] : glycemic control [Stable] : stable [Home] : Home [Wheelchair] : Wheelchair [Not Applicable - Long Term Care/Home Health Agency] : Long Term Care/Home Health Agency: Not Applicable [] : No [FreeTextEntry2] : Infection Prevention\par Promote Skin Integrity\par Offloading\par Elevation\par Compression Compliance\par Low Na+ Diet\par Maintain acceptable levels of pain\par Demonstrates use of both pharmacological and nonpharmacological pain management interventions\par Encourage Glycemic Control\par Weight reduction Strategies\par HBOT\par  [FreeTextEntry4] : Covid 19 PCR obtained today\par F/U 1 week for assessment and daily for HBOT\par Preauthorization for sharps debridement and Apligraf #6 submitted for next assessment \par MRI of the Right Foot approved, pt scheduling pending, pt reeducated on the benefit of receiving an MRI as to r/o any underlying issues.\par Preauthorization for 10 additional HBOT submitted today as per Dr. Rangel;27/30 completed thus far.

## 2021-03-20 ENCOUNTER — APPOINTMENT (OUTPATIENT)
Dept: HYPERBARIC MEDICINE | Facility: HOSPITAL | Age: 71
End: 2021-03-20
Payer: MEDICARE

## 2021-03-20 ENCOUNTER — OUTPATIENT (OUTPATIENT)
Dept: OUTPATIENT SERVICES | Facility: HOSPITAL | Age: 71
LOS: 1 days | Discharge: HOME | End: 2021-03-20
Payer: MEDICARE

## 2021-03-20 VITALS
RESPIRATION RATE: 16 BRPM | OXYGEN SATURATION: 98 % | TEMPERATURE: 96.2 F | SYSTOLIC BLOOD PRESSURE: 164 MMHG | DIASTOLIC BLOOD PRESSURE: 74 MMHG | HEART RATE: 62 BPM

## 2021-03-20 VITALS
OXYGEN SATURATION: 98 % | SYSTOLIC BLOOD PRESSURE: 152 MMHG | RESPIRATION RATE: 16 BRPM | HEART RATE: 67 BPM | DIASTOLIC BLOOD PRESSURE: 75 MMHG | TEMPERATURE: 97.5 F

## 2021-03-20 DIAGNOSIS — Z98.890 OTHER SPECIFIED POSTPROCEDURAL STATES: Chronic | ICD-10-CM

## 2021-03-20 DIAGNOSIS — Z98.49 CATARACT EXTRACTION STATUS, UNSPECIFIED EYE: ICD-10-CM

## 2021-03-20 DIAGNOSIS — E11.621 TYPE 2 DIABETES MELLITUS WITH FOOT ULCER: ICD-10-CM

## 2021-03-20 DIAGNOSIS — Z79.899 OTHER LONG TERM (CURRENT) DRUG THERAPY: ICD-10-CM

## 2021-03-20 DIAGNOSIS — Z88.0 ALLERGY STATUS TO PENICILLIN: ICD-10-CM

## 2021-03-20 DIAGNOSIS — L97.416 NON-PRESSURE CHRONIC ULCER OF RIGHT HEEL AND MIDFOOT WITH BONE INVOLVEMENT WITHOUT EVIDENCE OF NECROSIS: ICD-10-CM

## 2021-03-20 DIAGNOSIS — Z20.822 CONTACT WITH AND (SUSPECTED) EXPOSURE TO COVID-19: ICD-10-CM

## 2021-03-20 DIAGNOSIS — Z82.5 FAMILY HISTORY OF ASTHMA AND OTHER CHRONIC LOWER RESPIRATORY DISEASES: ICD-10-CM

## 2021-03-20 DIAGNOSIS — Z98.890 OTHER SPECIFIED POSTPROCEDURAL STATES: ICD-10-CM

## 2021-03-20 DIAGNOSIS — S02.91XA UNSPECIFIED FRACTURE OF SKULL, INITIAL ENCOUNTER FOR CLOSED FRACTURE: Chronic | ICD-10-CM

## 2021-03-20 DIAGNOSIS — Z83.3 FAMILY HISTORY OF DIABETES MELLITUS: ICD-10-CM

## 2021-03-20 DIAGNOSIS — Z86.69 PERSONAL HISTORY OF OTHER DISEASES OF THE NERVOUS SYSTEM AND SENSE ORGANS: Chronic | ICD-10-CM

## 2021-03-20 DIAGNOSIS — E11.40 TYPE 2 DIABETES MELLITUS WITH DIABETIC NEUROPATHY, UNSPECIFIED: ICD-10-CM

## 2021-03-20 DIAGNOSIS — Z79.4 LONG TERM (CURRENT) USE OF INSULIN: ICD-10-CM

## 2021-03-20 DIAGNOSIS — Z79.82 LONG TERM (CURRENT) USE OF ASPIRIN: ICD-10-CM

## 2021-03-20 DIAGNOSIS — E66.01 MORBID (SEVERE) OBESITY DUE TO EXCESS CALORIES: ICD-10-CM

## 2021-03-20 DIAGNOSIS — M75.00 ADHESIVE CAPSULITIS OF UNSPECIFIED SHOULDER: Chronic | ICD-10-CM

## 2021-03-20 PROCEDURE — G0277: CPT

## 2021-03-20 PROCEDURE — 82962 GLUCOSE BLOOD TEST: CPT

## 2021-03-20 PROCEDURE — 99183 HYPERBARIC OXYGEN THERAPY: CPT

## 2021-03-22 ENCOUNTER — APPOINTMENT (OUTPATIENT)
Dept: HYPERBARIC MEDICINE | Facility: HOSPITAL | Age: 71
End: 2021-03-22
Payer: MEDICARE

## 2021-03-22 ENCOUNTER — OUTPATIENT (OUTPATIENT)
Dept: OUTPATIENT SERVICES | Facility: HOSPITAL | Age: 71
LOS: 1 days | Discharge: HOME | End: 2021-03-22
Payer: MEDICARE

## 2021-03-22 VITALS
OXYGEN SATURATION: 96 % | SYSTOLIC BLOOD PRESSURE: 136 MMHG | RESPIRATION RATE: 18 BRPM | DIASTOLIC BLOOD PRESSURE: 59 MMHG | TEMPERATURE: 97.9 F | HEART RATE: 69 BPM

## 2021-03-22 VITALS
DIASTOLIC BLOOD PRESSURE: 70 MMHG | OXYGEN SATURATION: 99 % | RESPIRATION RATE: 20 BRPM | HEART RATE: 99 BPM | TEMPERATURE: 97.4 F | SYSTOLIC BLOOD PRESSURE: 155 MMHG

## 2021-03-22 DIAGNOSIS — E11.621 TYPE 2 DIABETES MELLITUS WITH FOOT ULCER: ICD-10-CM

## 2021-03-22 DIAGNOSIS — M75.00 ADHESIVE CAPSULITIS OF UNSPECIFIED SHOULDER: Chronic | ICD-10-CM

## 2021-03-22 DIAGNOSIS — Z98.890 OTHER SPECIFIED POSTPROCEDURAL STATES: Chronic | ICD-10-CM

## 2021-03-22 DIAGNOSIS — Z86.69 PERSONAL HISTORY OF OTHER DISEASES OF THE NERVOUS SYSTEM AND SENSE ORGANS: Chronic | ICD-10-CM

## 2021-03-22 DIAGNOSIS — L97.416 NON-PRESSURE CHRONIC ULCER OF RIGHT HEEL AND MIDFOOT WITH BONE INVOLVEMENT WITHOUT EVIDENCE OF NECROSIS: ICD-10-CM

## 2021-03-22 DIAGNOSIS — Z79.4 LONG TERM (CURRENT) USE OF INSULIN: ICD-10-CM

## 2021-03-22 DIAGNOSIS — S02.91XA UNSPECIFIED FRACTURE OF SKULL, INITIAL ENCOUNTER FOR CLOSED FRACTURE: Chronic | ICD-10-CM

## 2021-03-22 PROCEDURE — 99183 HYPERBARIC OXYGEN THERAPY: CPT

## 2021-03-22 PROCEDURE — 82962 GLUCOSE BLOOD TEST: CPT

## 2021-03-22 PROCEDURE — G0277: CPT

## 2021-03-23 ENCOUNTER — OUTPATIENT (OUTPATIENT)
Dept: OUTPATIENT SERVICES | Facility: HOSPITAL | Age: 71
LOS: 1 days | Discharge: HOME | End: 2021-03-23
Payer: MEDICARE

## 2021-03-23 ENCOUNTER — APPOINTMENT (OUTPATIENT)
Dept: HYPERBARIC MEDICINE | Facility: HOSPITAL | Age: 71
End: 2021-03-23
Payer: MEDICARE

## 2021-03-23 VITALS
RESPIRATION RATE: 20 BRPM | SYSTOLIC BLOOD PRESSURE: 141 MMHG | HEART RATE: 59 BPM | DIASTOLIC BLOOD PRESSURE: 59 MMHG | OXYGEN SATURATION: 99 % | TEMPERATURE: 97.2 F

## 2021-03-23 VITALS
OXYGEN SATURATION: 97 % | DIASTOLIC BLOOD PRESSURE: 66 MMHG | RESPIRATION RATE: 20 BRPM | TEMPERATURE: 97.3 F | SYSTOLIC BLOOD PRESSURE: 153 MMHG | HEART RATE: 66 BPM

## 2021-03-23 DIAGNOSIS — Z98.890 OTHER SPECIFIED POSTPROCEDURAL STATES: Chronic | ICD-10-CM

## 2021-03-23 DIAGNOSIS — Z79.4 LONG TERM (CURRENT) USE OF INSULIN: ICD-10-CM

## 2021-03-23 DIAGNOSIS — M75.00 ADHESIVE CAPSULITIS OF UNSPECIFIED SHOULDER: Chronic | ICD-10-CM

## 2021-03-23 DIAGNOSIS — S02.91XA UNSPECIFIED FRACTURE OF SKULL, INITIAL ENCOUNTER FOR CLOSED FRACTURE: Chronic | ICD-10-CM

## 2021-03-23 DIAGNOSIS — E11.621 TYPE 2 DIABETES MELLITUS WITH FOOT ULCER: ICD-10-CM

## 2021-03-23 DIAGNOSIS — L97.416 NON-PRESSURE CHRONIC ULCER OF RIGHT HEEL AND MIDFOOT WITH BONE INVOLVEMENT WITHOUT EVIDENCE OF NECROSIS: ICD-10-CM

## 2021-03-23 DIAGNOSIS — Z86.69 PERSONAL HISTORY OF OTHER DISEASES OF THE NERVOUS SYSTEM AND SENSE ORGANS: Chronic | ICD-10-CM

## 2021-03-23 PROCEDURE — 99183 HYPERBARIC OXYGEN THERAPY: CPT

## 2021-03-23 PROCEDURE — 82962 GLUCOSE BLOOD TEST: CPT

## 2021-03-23 PROCEDURE — G0277: CPT

## 2021-03-24 ENCOUNTER — APPOINTMENT (OUTPATIENT)
Dept: HYPERBARIC MEDICINE | Facility: HOSPITAL | Age: 71
End: 2021-03-24

## 2021-03-25 ENCOUNTER — OUTPATIENT (OUTPATIENT)
Dept: OUTPATIENT SERVICES | Facility: HOSPITAL | Age: 71
LOS: 1 days | Discharge: HOME | End: 2021-03-25
Payer: MEDICARE

## 2021-03-25 ENCOUNTER — APPOINTMENT (OUTPATIENT)
Dept: HYPERBARIC MEDICINE | Facility: HOSPITAL | Age: 71
End: 2021-03-25
Payer: MEDICARE

## 2021-03-25 VITALS
OXYGEN SATURATION: 99 % | HEART RATE: 56 BPM | DIASTOLIC BLOOD PRESSURE: 63 MMHG | SYSTOLIC BLOOD PRESSURE: 135 MMHG | TEMPERATURE: 97.1 F | RESPIRATION RATE: 18 BRPM

## 2021-03-25 VITALS
DIASTOLIC BLOOD PRESSURE: 62 MMHG | HEART RATE: 64 BPM | OXYGEN SATURATION: 97 % | SYSTOLIC BLOOD PRESSURE: 146 MMHG | TEMPERATURE: 98 F | RESPIRATION RATE: 18 BRPM

## 2021-03-25 DIAGNOSIS — E11.621 TYPE 2 DIABETES MELLITUS WITH FOOT ULCER: ICD-10-CM

## 2021-03-25 DIAGNOSIS — L97.416 NON-PRESSURE CHRONIC ULCER OF RIGHT HEEL AND MIDFOOT WITH BONE INVOLVEMENT WITHOUT EVIDENCE OF NECROSIS: ICD-10-CM

## 2021-03-25 DIAGNOSIS — M75.00 ADHESIVE CAPSULITIS OF UNSPECIFIED SHOULDER: Chronic | ICD-10-CM

## 2021-03-25 DIAGNOSIS — Z98.890 OTHER SPECIFIED POSTPROCEDURAL STATES: Chronic | ICD-10-CM

## 2021-03-25 DIAGNOSIS — S02.91XA UNSPECIFIED FRACTURE OF SKULL, INITIAL ENCOUNTER FOR CLOSED FRACTURE: Chronic | ICD-10-CM

## 2021-03-25 DIAGNOSIS — Z86.69 PERSONAL HISTORY OF OTHER DISEASES OF THE NERVOUS SYSTEM AND SENSE ORGANS: Chronic | ICD-10-CM

## 2021-03-25 DIAGNOSIS — Z79.4 LONG TERM (CURRENT) USE OF INSULIN: ICD-10-CM

## 2021-03-25 PROCEDURE — 82962 GLUCOSE BLOOD TEST: CPT

## 2021-03-25 PROCEDURE — 99183 HYPERBARIC OXYGEN THERAPY: CPT

## 2021-03-25 PROCEDURE — G0277: CPT

## 2021-03-26 ENCOUNTER — OUTPATIENT (OUTPATIENT)
Dept: OUTPATIENT SERVICES | Facility: HOSPITAL | Age: 71
LOS: 1 days | Discharge: HOME | End: 2021-03-26
Payer: MEDICARE

## 2021-03-26 ENCOUNTER — APPOINTMENT (OUTPATIENT)
Dept: SURGERY | Facility: HOSPITAL | Age: 71
End: 2021-03-26

## 2021-03-26 ENCOUNTER — APPOINTMENT (OUTPATIENT)
Dept: PODIATRY | Facility: HOSPITAL | Age: 71
End: 2021-03-26
Payer: MEDICARE

## 2021-03-26 VITALS
OXYGEN SATURATION: 96 % | RESPIRATION RATE: 20 BRPM | TEMPERATURE: 97.2 F | SYSTOLIC BLOOD PRESSURE: 170 MMHG | HEART RATE: 65 BPM | DIASTOLIC BLOOD PRESSURE: 72 MMHG | BODY MASS INDEX: 41.02 KG/M2 | WEIGHT: 293 LBS | HEIGHT: 71 IN

## 2021-03-26 DIAGNOSIS — Z98.890 OTHER SPECIFIED POSTPROCEDURAL STATES: Chronic | ICD-10-CM

## 2021-03-26 DIAGNOSIS — M75.00 ADHESIVE CAPSULITIS OF UNSPECIFIED SHOULDER: Chronic | ICD-10-CM

## 2021-03-26 DIAGNOSIS — E11.621 TYPE 2 DIABETES MELLITUS WITH FOOT ULCER: ICD-10-CM

## 2021-03-26 DIAGNOSIS — Z86.69 PERSONAL HISTORY OF OTHER DISEASES OF THE NERVOUS SYSTEM AND SENSE ORGANS: Chronic | ICD-10-CM

## 2021-03-26 DIAGNOSIS — S02.91XA UNSPECIFIED FRACTURE OF SKULL, INITIAL ENCOUNTER FOR CLOSED FRACTURE: Chronic | ICD-10-CM

## 2021-03-26 LAB — SARS-COV-2 RNA SPEC QL NAA+PROBE: SIGNIFICANT CHANGE UP

## 2021-03-26 PROCEDURE — U0003: CPT

## 2021-03-26 PROCEDURE — 15275 SKIN SUB GRAFT FACE/NK/HF/G: CPT | Mod: 58

## 2021-03-26 PROCEDURE — 15275 SKIN SUB GRAFT FACE/NK/HF/G: CPT

## 2021-03-26 PROCEDURE — U0005: CPT

## 2021-03-27 ENCOUNTER — APPOINTMENT (OUTPATIENT)
Dept: HYPERBARIC MEDICINE | Facility: HOSPITAL | Age: 71
End: 2021-03-27
Payer: MEDICARE

## 2021-03-27 ENCOUNTER — OUTPATIENT (OUTPATIENT)
Dept: OUTPATIENT SERVICES | Facility: HOSPITAL | Age: 71
LOS: 1 days | Discharge: HOME | End: 2021-03-27
Payer: MEDICARE

## 2021-03-27 VITALS
DIASTOLIC BLOOD PRESSURE: 60 MMHG | RESPIRATION RATE: 20 BRPM | HEART RATE: 57 BPM | OXYGEN SATURATION: 99 % | SYSTOLIC BLOOD PRESSURE: 138 MMHG | TEMPERATURE: 97.1 F

## 2021-03-27 VITALS
OXYGEN SATURATION: 99 % | DIASTOLIC BLOOD PRESSURE: 56 MMHG | RESPIRATION RATE: 20 BRPM | SYSTOLIC BLOOD PRESSURE: 144 MMHG | HEART RATE: 64 BPM | TEMPERATURE: 97.2 F

## 2021-03-27 DIAGNOSIS — S02.91XA UNSPECIFIED FRACTURE OF SKULL, INITIAL ENCOUNTER FOR CLOSED FRACTURE: Chronic | ICD-10-CM

## 2021-03-27 DIAGNOSIS — Z79.899 OTHER LONG TERM (CURRENT) DRUG THERAPY: ICD-10-CM

## 2021-03-27 DIAGNOSIS — E11.621 TYPE 2 DIABETES MELLITUS WITH FOOT ULCER: ICD-10-CM

## 2021-03-27 DIAGNOSIS — L97.416 NON-PRESSURE CHRONIC ULCER OF RIGHT HEEL AND MIDFOOT WITH BONE INVOLVEMENT WITHOUT EVIDENCE OF NECROSIS: ICD-10-CM

## 2021-03-27 DIAGNOSIS — Z98.890 OTHER SPECIFIED POSTPROCEDURAL STATES: Chronic | ICD-10-CM

## 2021-03-27 DIAGNOSIS — E11.40 TYPE 2 DIABETES MELLITUS WITH DIABETIC NEUROPATHY, UNSPECIFIED: ICD-10-CM

## 2021-03-27 DIAGNOSIS — E66.01 MORBID (SEVERE) OBESITY DUE TO EXCESS CALORIES: ICD-10-CM

## 2021-03-27 DIAGNOSIS — Z98.49 CATARACT EXTRACTION STATUS, UNSPECIFIED EYE: ICD-10-CM

## 2021-03-27 DIAGNOSIS — Z20.822 CONTACT WITH AND (SUSPECTED) EXPOSURE TO COVID-19: ICD-10-CM

## 2021-03-27 DIAGNOSIS — Z79.4 LONG TERM (CURRENT) USE OF INSULIN: ICD-10-CM

## 2021-03-27 DIAGNOSIS — Z83.3 FAMILY HISTORY OF DIABETES MELLITUS: ICD-10-CM

## 2021-03-27 DIAGNOSIS — Z79.82 LONG TERM (CURRENT) USE OF ASPIRIN: ICD-10-CM

## 2021-03-27 DIAGNOSIS — M75.00 ADHESIVE CAPSULITIS OF UNSPECIFIED SHOULDER: Chronic | ICD-10-CM

## 2021-03-27 DIAGNOSIS — Z82.5 FAMILY HISTORY OF ASTHMA AND OTHER CHRONIC LOWER RESPIRATORY DISEASES: ICD-10-CM

## 2021-03-27 DIAGNOSIS — Z88.0 ALLERGY STATUS TO PENICILLIN: ICD-10-CM

## 2021-03-27 DIAGNOSIS — Z86.69 PERSONAL HISTORY OF OTHER DISEASES OF THE NERVOUS SYSTEM AND SENSE ORGANS: Chronic | ICD-10-CM

## 2021-03-27 DIAGNOSIS — Z98.890 OTHER SPECIFIED POSTPROCEDURAL STATES: ICD-10-CM

## 2021-03-27 PROCEDURE — 99183 HYPERBARIC OXYGEN THERAPY: CPT

## 2021-03-27 PROCEDURE — G0277: CPT

## 2021-03-27 PROCEDURE — 82962 GLUCOSE BLOOD TEST: CPT

## 2021-03-29 ENCOUNTER — APPOINTMENT (OUTPATIENT)
Dept: HYPERBARIC MEDICINE | Facility: HOSPITAL | Age: 71
End: 2021-03-29
Payer: MEDICARE

## 2021-03-29 ENCOUNTER — OUTPATIENT (OUTPATIENT)
Dept: OUTPATIENT SERVICES | Facility: HOSPITAL | Age: 71
LOS: 1 days | Discharge: HOME | End: 2021-03-29
Payer: MEDICARE

## 2021-03-29 VITALS
HEART RATE: 80 BPM | DIASTOLIC BLOOD PRESSURE: 66 MMHG | TEMPERATURE: 98.2 F | OXYGEN SATURATION: 95 % | SYSTOLIC BLOOD PRESSURE: 148 MMHG | RESPIRATION RATE: 20 BRPM

## 2021-03-29 VITALS
DIASTOLIC BLOOD PRESSURE: 61 MMHG | RESPIRATION RATE: 18 BRPM | HEART RATE: 61 BPM | TEMPERATURE: 97.1 F | OXYGEN SATURATION: 98 % | SYSTOLIC BLOOD PRESSURE: 123 MMHG

## 2021-03-29 DIAGNOSIS — Z79.4 LONG TERM (CURRENT) USE OF INSULIN: ICD-10-CM

## 2021-03-29 DIAGNOSIS — Z98.890 OTHER SPECIFIED POSTPROCEDURAL STATES: Chronic | ICD-10-CM

## 2021-03-29 DIAGNOSIS — Z86.69 PERSONAL HISTORY OF OTHER DISEASES OF THE NERVOUS SYSTEM AND SENSE ORGANS: Chronic | ICD-10-CM

## 2021-03-29 DIAGNOSIS — S02.91XA UNSPECIFIED FRACTURE OF SKULL, INITIAL ENCOUNTER FOR CLOSED FRACTURE: Chronic | ICD-10-CM

## 2021-03-29 DIAGNOSIS — L97.416 NON-PRESSURE CHRONIC ULCER OF RIGHT HEEL AND MIDFOOT WITH BONE INVOLVEMENT WITHOUT EVIDENCE OF NECROSIS: ICD-10-CM

## 2021-03-29 DIAGNOSIS — M75.00 ADHESIVE CAPSULITIS OF UNSPECIFIED SHOULDER: Chronic | ICD-10-CM

## 2021-03-29 DIAGNOSIS — E11.621 TYPE 2 DIABETES MELLITUS WITH FOOT ULCER: ICD-10-CM

## 2021-03-29 PROCEDURE — G0277: CPT

## 2021-03-29 PROCEDURE — 99183 HYPERBARIC OXYGEN THERAPY: CPT

## 2021-03-29 PROCEDURE — 82962 GLUCOSE BLOOD TEST: CPT

## 2021-03-29 NOTE — PHYSICAL THERAPY INITIAL EVALUATION ADULT - RANGE OF MOTION EXAMINATION, REHAB EVAL
"Marin Galdamez 51 y.o. male ambulatory to triage with very slow shuffling gait for     Chief Complaint   Patient presents with   • Leg Pain     bilateral, pain chronic but pt states it became much worse yesterday.  Pt reports a \"big big history of blood clots\"   • Hip Pain     L sided     Pt reports was supposed to have vascular surgery on Thursday of last week but \"I had pneumonia so they postponed it with no new date\".  Pt states he has been taking oral antibiotics for the PNA and percocet for the pain.  Pt also takes coumadin.  Pt's last percocet this morning, held of taking other medications.  /85   Pulse 99   Temp 36.2 °C (97.2 °F) (Temporal)   Resp 16   Ht 1.702 m (5' 7\")   Wt 55.7 kg (122 lb 12.7 oz)   SpO2 98%   BMI 19.23 kg/m²   Pt returned to the lobby to await bed assignment.  Advised to return to the triage desk for any changes/concerns.  "
bilateral upper extremity ROM was WNL (within normal limits)/bilateral lower extremity was ROM was WNL (within normal limits)

## 2021-03-30 ENCOUNTER — OUTPATIENT (OUTPATIENT)
Dept: OUTPATIENT SERVICES | Facility: HOSPITAL | Age: 71
LOS: 1 days | Discharge: HOME | End: 2021-03-30
Payer: MEDICARE

## 2021-03-30 ENCOUNTER — APPOINTMENT (OUTPATIENT)
Dept: HYPERBARIC MEDICINE | Facility: HOSPITAL | Age: 71
End: 2021-03-30
Payer: MEDICARE

## 2021-03-30 VITALS
DIASTOLIC BLOOD PRESSURE: 72 MMHG | HEART RATE: 59 BPM | OXYGEN SATURATION: 100 % | TEMPERATURE: 96.9 F | RESPIRATION RATE: 18 BRPM | SYSTOLIC BLOOD PRESSURE: 139 MMHG

## 2021-03-30 VITALS
OXYGEN SATURATION: 98 % | SYSTOLIC BLOOD PRESSURE: 141 MMHG | HEART RATE: 53 BPM | RESPIRATION RATE: 20 BRPM | TEMPERATURE: 97 F | DIASTOLIC BLOOD PRESSURE: 60 MMHG

## 2021-03-30 DIAGNOSIS — Z98.890 OTHER SPECIFIED POSTPROCEDURAL STATES: Chronic | ICD-10-CM

## 2021-03-30 DIAGNOSIS — S02.91XA UNSPECIFIED FRACTURE OF SKULL, INITIAL ENCOUNTER FOR CLOSED FRACTURE: Chronic | ICD-10-CM

## 2021-03-30 DIAGNOSIS — Z79.4 LONG TERM (CURRENT) USE OF INSULIN: ICD-10-CM

## 2021-03-30 DIAGNOSIS — M75.00 ADHESIVE CAPSULITIS OF UNSPECIFIED SHOULDER: Chronic | ICD-10-CM

## 2021-03-30 DIAGNOSIS — E11.621 TYPE 2 DIABETES MELLITUS WITH FOOT ULCER: ICD-10-CM

## 2021-03-30 DIAGNOSIS — L97.416 NON-PRESSURE CHRONIC ULCER OF RIGHT HEEL AND MIDFOOT WITH BONE INVOLVEMENT WITHOUT EVIDENCE OF NECROSIS: ICD-10-CM

## 2021-03-30 DIAGNOSIS — Z86.69 PERSONAL HISTORY OF OTHER DISEASES OF THE NERVOUS SYSTEM AND SENSE ORGANS: Chronic | ICD-10-CM

## 2021-03-30 PROCEDURE — G0277: CPT

## 2021-03-30 PROCEDURE — 99183 HYPERBARIC OXYGEN THERAPY: CPT

## 2021-03-30 PROCEDURE — 82962 GLUCOSE BLOOD TEST: CPT

## 2021-03-30 NOTE — REVIEW OF SYSTEMS
[FreeTextEntry1] : 3784 [Fever] : no fever [Chills] : no chills [Eye Pain] : no eye pain [Loss Of Hearing] : no hearing loss [Shortness Of Breath] : no shortness of breath [Abdominal Pain] : no abdominal pain [Vomiting] : no vomiting [Skin Wound] : skin wound [Anxiety] : no anxiety [Easy Bleeding] : no tendency for easy bleeding [FreeTextEntry5] : HTN [de-identified] : DFU 3 plantar right foot  [de-identified] : IDDM with neuropathy  [de-identified] : KENNY

## 2021-03-30 NOTE — PROCEDURE
[Saline] : saline [Scalpel] : scalpel [Other: ___] : [unfilled] [Fenestrated] : fenestrated [Hydrated with saline] : hydrated with saline [Apligraf] : apligraf [____ % was used] : and [unfilled] % was used [FreeTextEntry1] : alginate  [FreeTextEntry9] : 8604

## 2021-03-30 NOTE — ASSESSMENT
[Verbal] : Verbal [Patient] : Patient [Good - alert, interested, motivated] : Good - alert, interested, motivated [Verbalizes knowledge/Understanding] : Verbalizes knowledge/understanding [Dressing changes] : dressing changes [Foot Care] : foot care [Skin Care] : skin care [Pressure relief] : pressure relief [Signs and symptoms of infection] : sign and symptoms of infection [How and When to Call] : how and when to call [Hyperbaric Therapy] : hyperbaric therapy [Off-loading] : off-loading [Compression Therapy] : compression therapy [Patient responsibility to plan of care] : patient responsibility to plan of care [] : Yes [Stable] : stable [Home] : Home [Wheelchair] : Wheelchair [Not Applicable - Long Term Care/Home Health Agency] : Long Term Care/Home Health Agency: Not Applicable [FreeTextEntry2] : Restore Skin Integrity\par Infection Control\par Offloading\par Compression Therapy\par Localized wound care\par Maintain acceptable pain levels at satisfactory relief.\par Demonstrates use of both nonpharmacological and pharmacological pain relief strategies [FreeTextEntry4] : Photos Taken\par MD Feels patient would benefit From Additional Apligraf, Auth Submitted\par HBOT 31/40\par COVID 19 PCR Swab Obtained and sent to lab per MD orders\par F/U to [facility] Daily HBOT, 1 week for assessment

## 2021-03-30 NOTE — ASSESSMENT
[Verbal] : Verbal [Patient] : Patient [Good - alert, interested, motivated] : Good - alert, interested, motivated [Verbalizes knowledge/Understanding] : Verbalizes knowledge/understanding [Dressing changes] : dressing changes [Foot Care] : foot care [Skin Care] : skin care [Pressure relief] : pressure relief [Signs and symptoms of infection] : sign and symptoms of infection [How and When to Call] : how and when to call [Hyperbaric Therapy] : hyperbaric therapy [Off-loading] : off-loading [Compression Therapy] : compression therapy [Patient responsibility to plan of care] : patient responsibility to plan of care [] : Yes [Stable] : stable [Home] : Home [Wheelchair] : Wheelchair [Not Applicable - Long Term Care/Home Health Agency] : Long Term Care/Home Health Agency: Not Applicable [FreeTextEntry2] : Restore Skin Integrity\par Infection Control\par Offloading\par Compression Therapy\par Localized wound care\par Maintain acceptable pain levels at satisfactory relief.\par Demonstrates use of both nonpharmacological and pharmacological pain relief strategies [FreeTextEntry4] : Photos Taken\par MD Feels patient would benefit From Additional Apligraf, Auth Submitted\par HBOT 31/40\par COVID 19 PCR Swab Obtained and sent to lab per MD orders\par F/U to [facility] Daily HBOT, 1 week for assessment

## 2021-03-30 NOTE — REVIEW OF SYSTEMS
[FreeTextEntry1] : 3492 [Fever] : no fever [Chills] : no chills [Eye Pain] : no eye pain [Loss Of Hearing] : no hearing loss [Shortness Of Breath] : no shortness of breath [Abdominal Pain] : no abdominal pain [Vomiting] : no vomiting [Skin Wound] : skin wound [Anxiety] : no anxiety [Easy Bleeding] : no tendency for easy bleeding [FreeTextEntry5] : HTN [de-identified] : DFU 3 plantar right foot  [de-identified] : IDDM with neuropathy  [de-identified] : KENNY

## 2021-03-30 NOTE — PROCEDURE
[Saline] : saline [Scalpel] : scalpel [Other: ___] : [unfilled] [Fenestrated] : fenestrated [Hydrated with saline] : hydrated with saline [Apligraf] : apligraf [____ % was used] : and [unfilled] % was used [FreeTextEntry1] : alginate  [FreeTextEntry9] : 0109

## 2021-03-30 NOTE — PHYSICAL EXAM
[4 x 4] : 4 x 4  [Abdominal Pad] : Abdominal Pad [0] : left 0 [1+] : left 1+ [Purpura] : no purpura  [Petechiae] : no petechiae [Skin Ulcer] : ulcer [Alert] : alert [Oriented to Person] : oriented to person [Oriented to Place] : oriented to place [Calm] : calm [de-identified] : calm [de-identified] : HTN ,morbid obesity  [de-identified] : DFU 3 plantar right foot  [de-identified] : Diabetic neuropathy  [de-identified] : Circ neurovascular function WNL post Ace wraps, pt expressed comfort.\par Apligraf 44 sq cm\par 100% Used 0% Discarded \par [id number]\par exp: 03/16/2021\par Lot: [id number]\par pH 7.3-7.5\par  [FreeTextEntry1] : Plantar Foot 1 st met [FreeTextEntry2] : 4.0 [FreeTextEntry3] : 1.2 [FreeTextEntry4] : 0.1-0.2 [de-identified] : serosanguineous [de-identified] : 11 O'Clock 1.7cm [de-identified] : 0.2-0.3 cm @ 8-9 o' clock [de-identified] : callus [de-identified] : 90-95% [de-identified] : 5-10% [FreeTextEntry5] : Post Debridement measurements- 4.1 x 1.3 x 0.2-0.3 [de-identified] : Apligraf 44 sq cm- 100% Used 0% Discarded [de-identified] :  to hold dressing in place [de-identified] : Wound Veil and Calcium Alginate [de-identified] : Cleansed with Normal saline\par Reconstituted in NS\par Lot: [id number]\par Exp: 05/01/2023\par \par  [TWNoteComboBox1] : Right [TWNoteComboBox4] : Small [TWNoteComboBox5] : No [TWNoteComboBox6] : Surgical [de-identified] : Yes [de-identified] : other [de-identified] : None [de-identified] : None [de-identified] : >75% [de-identified] : Yes [de-identified] : 2.5% Lidocaine Topical [TWNoteComboBox7] : Arlin [de-identified] : Application of skin substitute [de-identified] : Ace wraps [de-identified] : Weekly [de-identified] : Primary Dressing

## 2021-03-30 NOTE — PHYSICAL EXAM
[4 x 4] : 4 x 4  [Abdominal Pad] : Abdominal Pad [0] : left 0 [1+] : left 1+ [Purpura] : no purpura  [Petechiae] : no petechiae [Skin Ulcer] : ulcer [Alert] : alert [Oriented to Person] : oriented to person [Oriented to Place] : oriented to place [Calm] : calm [de-identified] : calm [de-identified] : HTN ,morbid obesity  [de-identified] : DFU 3 plantar right foot  [de-identified] : Diabetic neuropathy  [de-identified] : Circ neurovascular function WNL post Ace wraps, pt expressed comfort.\par Apligraf 44 sq cm\par 100% Used 0% Discarded \par [id number]\par exp: 03/16/2021\par Lot: [id number]\par pH 7.3-7.5\par  [FreeTextEntry1] : Plantar Foot 1 st met [FreeTextEntry2] : 4.0 [FreeTextEntry3] : 1.2 [FreeTextEntry4] : 0.1-0.2 [de-identified] : serosanguineous [de-identified] : 11 O'Clock 1.7cm [de-identified] : 0.2-0.3 cm @ 8-9 o' clock [de-identified] : callus [de-identified] : 90-95% [de-identified] : 5-10% [FreeTextEntry5] : Post Debridement measurements- 4.1 x 1.3 x 0.2-0.3 [de-identified] : Apligraf 44 sq cm- 100% Used 0% Discarded [de-identified] :  to hold dressing in place [de-identified] : Wound Veil and Calcium Alginate [de-identified] : Cleansed with Normal saline\par Reconstituted in NS\par Lot: [id number]\par Exp: 05/01/2023\par \par  [TWNoteComboBox1] : Right [TWNoteComboBox4] : Small [TWNoteComboBox5] : No [TWNoteComboBox6] : Surgical [de-identified] : Yes [de-identified] : other [de-identified] : None [de-identified] : None [de-identified] : >75% [de-identified] : Yes [de-identified] : 2.5% Lidocaine Topical [TWNoteComboBox7] : Arlin [de-identified] : Application of skin substitute [de-identified] : Ace wraps [de-identified] : Weekly [de-identified] : Primary Dressing

## 2021-03-31 ENCOUNTER — NON-APPOINTMENT (OUTPATIENT)
Age: 71
End: 2021-03-31

## 2021-03-31 ENCOUNTER — OUTPATIENT (OUTPATIENT)
Dept: OUTPATIENT SERVICES | Facility: HOSPITAL | Age: 71
LOS: 1 days | Discharge: HOME | End: 2021-03-31
Payer: MEDICARE

## 2021-03-31 ENCOUNTER — APPOINTMENT (OUTPATIENT)
Dept: HYPERBARIC MEDICINE | Facility: HOSPITAL | Age: 71
End: 2021-03-31
Payer: MEDICARE

## 2021-03-31 VITALS
TEMPERATURE: 97.1 F | SYSTOLIC BLOOD PRESSURE: 161 MMHG | RESPIRATION RATE: 20 BRPM | HEART RATE: 68 BPM | DIASTOLIC BLOOD PRESSURE: 84 MMHG | OXYGEN SATURATION: 100 %

## 2021-03-31 VITALS
SYSTOLIC BLOOD PRESSURE: 145 MMHG | TEMPERATURE: 97.1 F | HEART RATE: 72 BPM | RESPIRATION RATE: 20 BRPM | OXYGEN SATURATION: 95 % | DIASTOLIC BLOOD PRESSURE: 67 MMHG

## 2021-03-31 DIAGNOSIS — Z98.890 OTHER SPECIFIED POSTPROCEDURAL STATES: Chronic | ICD-10-CM

## 2021-03-31 DIAGNOSIS — S02.91XA UNSPECIFIED FRACTURE OF SKULL, INITIAL ENCOUNTER FOR CLOSED FRACTURE: Chronic | ICD-10-CM

## 2021-03-31 DIAGNOSIS — E11.621 TYPE 2 DIABETES MELLITUS WITH FOOT ULCER: ICD-10-CM

## 2021-03-31 DIAGNOSIS — Z86.69 PERSONAL HISTORY OF OTHER DISEASES OF THE NERVOUS SYSTEM AND SENSE ORGANS: Chronic | ICD-10-CM

## 2021-03-31 DIAGNOSIS — M75.00 ADHESIVE CAPSULITIS OF UNSPECIFIED SHOULDER: Chronic | ICD-10-CM

## 2021-03-31 PROCEDURE — 82962 GLUCOSE BLOOD TEST: CPT

## 2021-03-31 PROCEDURE — G0277: CPT

## 2021-03-31 PROCEDURE — 99183 HYPERBARIC OXYGEN THERAPY: CPT

## 2021-04-01 ENCOUNTER — OUTPATIENT (OUTPATIENT)
Dept: OUTPATIENT SERVICES | Facility: HOSPITAL | Age: 71
LOS: 1 days | Discharge: HOME | End: 2021-04-01
Payer: MEDICARE

## 2021-04-01 ENCOUNTER — APPOINTMENT (OUTPATIENT)
Dept: HYPERBARIC MEDICINE | Facility: HOSPITAL | Age: 71
End: 2021-04-01
Payer: MEDICARE

## 2021-04-01 VITALS
HEART RATE: 82 BPM | RESPIRATION RATE: 20 BRPM | OXYGEN SATURATION: 100 % | DIASTOLIC BLOOD PRESSURE: 64 MMHG | SYSTOLIC BLOOD PRESSURE: 148 MMHG | TEMPERATURE: 97.1 F

## 2021-04-01 VITALS
DIASTOLIC BLOOD PRESSURE: 60 MMHG | RESPIRATION RATE: 18 BRPM | HEART RATE: 66 BPM | TEMPERATURE: 97.2 F | SYSTOLIC BLOOD PRESSURE: 134 MMHG | OXYGEN SATURATION: 98 %

## 2021-04-01 DIAGNOSIS — Z98.890 OTHER SPECIFIED POSTPROCEDURAL STATES: Chronic | ICD-10-CM

## 2021-04-01 DIAGNOSIS — M75.00 ADHESIVE CAPSULITIS OF UNSPECIFIED SHOULDER: Chronic | ICD-10-CM

## 2021-04-01 DIAGNOSIS — L97.416 NON-PRESSURE CHRONIC ULCER OF RIGHT HEEL AND MIDFOOT WITH BONE INVOLVEMENT WITHOUT EVIDENCE OF NECROSIS: ICD-10-CM

## 2021-04-01 DIAGNOSIS — E11.621 TYPE 2 DIABETES MELLITUS WITH FOOT ULCER: ICD-10-CM

## 2021-04-01 DIAGNOSIS — S02.91XA UNSPECIFIED FRACTURE OF SKULL, INITIAL ENCOUNTER FOR CLOSED FRACTURE: Chronic | ICD-10-CM

## 2021-04-01 DIAGNOSIS — Z79.4 LONG TERM (CURRENT) USE OF INSULIN: ICD-10-CM

## 2021-04-01 DIAGNOSIS — Z86.69 PERSONAL HISTORY OF OTHER DISEASES OF THE NERVOUS SYSTEM AND SENSE ORGANS: Chronic | ICD-10-CM

## 2021-04-01 PROCEDURE — G0277: CPT

## 2021-04-01 PROCEDURE — 82962 GLUCOSE BLOOD TEST: CPT

## 2021-04-01 PROCEDURE — 99183 HYPERBARIC OXYGEN THERAPY: CPT

## 2021-04-01 NOTE — ASSESSMENT
[Patient prepared for dive] : Patient prepared for dive [Patient descended without problem for 9 minutes] : Patient descended without problem for 9 minutes [No dizziness or thirst] :  No dizziness or thirst [No ear problems] : No ear problems [Vital signs stable] : Vital signs stable [Tolerating dive well] : Tolerating dive well [No Chest Pain, shortness of breath] : No Chest Pain, shortness of breath [Respiratory Rate Stable] : Respiratory Rate Stable [No chest pain, shortness of breath, or ear pain] :  No chest pain, shortness of breath, or ear pain  [Tolerated Ascent well] : Tolerated Ascent well [Vital Signs stable] : Vital Signs stable [A physician was present throughout the entire HBOT] : A physician was present throughout the entire HBOT [No] : No [Clinically Stable] : Clinically stable [Continue Treatment Plan] : Continue treatment plan [Patient undergoing HBO treatment for __________] : Patient undergoing HBO treatment for [unfilled]

## 2021-04-02 ENCOUNTER — APPOINTMENT (OUTPATIENT)
Dept: PODIATRY | Facility: HOSPITAL | Age: 71
End: 2021-04-02
Payer: MEDICARE

## 2021-04-02 ENCOUNTER — OUTPATIENT (OUTPATIENT)
Dept: OUTPATIENT SERVICES | Facility: HOSPITAL | Age: 71
LOS: 1 days | Discharge: HOME | End: 2021-04-02
Payer: MEDICARE

## 2021-04-02 VITALS
OXYGEN SATURATION: 98 % | DIASTOLIC BLOOD PRESSURE: 73 MMHG | RESPIRATION RATE: 20 BRPM | WEIGHT: 293 LBS | TEMPERATURE: 97.1 F | HEART RATE: 66 BPM | HEIGHT: 71 IN | SYSTOLIC BLOOD PRESSURE: 146 MMHG | BODY MASS INDEX: 41.02 KG/M2

## 2021-04-02 DIAGNOSIS — M75.00 ADHESIVE CAPSULITIS OF UNSPECIFIED SHOULDER: Chronic | ICD-10-CM

## 2021-04-02 DIAGNOSIS — Z86.69 PERSONAL HISTORY OF OTHER DISEASES OF THE NERVOUS SYSTEM AND SENSE ORGANS: Chronic | ICD-10-CM

## 2021-04-02 DIAGNOSIS — Z98.890 OTHER SPECIFIED POSTPROCEDURAL STATES: Chronic | ICD-10-CM

## 2021-04-02 DIAGNOSIS — E11.621 TYPE 2 DIABETES MELLITUS WITH FOOT ULCER: ICD-10-CM

## 2021-04-02 DIAGNOSIS — S02.91XA UNSPECIFIED FRACTURE OF SKULL, INITIAL ENCOUNTER FOR CLOSED FRACTURE: Chronic | ICD-10-CM

## 2021-04-02 LAB — SARS-COV-2 RNA SPEC QL NAA+PROBE: SIGNIFICANT CHANGE UP

## 2021-04-02 PROCEDURE — 15275 SKIN SUB GRAFT FACE/NK/HF/G: CPT | Mod: 58

## 2021-04-02 PROCEDURE — 15275 SKIN SUB GRAFT FACE/NK/HF/G: CPT

## 2021-04-02 PROCEDURE — U0003: CPT

## 2021-04-02 PROCEDURE — U0005: CPT

## 2021-04-03 ENCOUNTER — APPOINTMENT (OUTPATIENT)
Dept: HYPERBARIC MEDICINE | Facility: HOSPITAL | Age: 71
End: 2021-04-03
Payer: MEDICARE

## 2021-04-03 ENCOUNTER — OUTPATIENT (OUTPATIENT)
Dept: OUTPATIENT SERVICES | Facility: HOSPITAL | Age: 71
LOS: 1 days | Discharge: HOME | End: 2021-04-03
Payer: MEDICARE

## 2021-04-03 VITALS
DIASTOLIC BLOOD PRESSURE: 74 MMHG | RESPIRATION RATE: 16 BRPM | SYSTOLIC BLOOD PRESSURE: 150 MMHG | TEMPERATURE: 98.1 F | HEART RATE: 74 BPM | OXYGEN SATURATION: 98 %

## 2021-04-03 VITALS
DIASTOLIC BLOOD PRESSURE: 64 MMHG | HEART RATE: 78 BPM | TEMPERATURE: 98 F | SYSTOLIC BLOOD PRESSURE: 138 MMHG | OXYGEN SATURATION: 98 % | RESPIRATION RATE: 16 BRPM

## 2021-04-03 DIAGNOSIS — Z86.69 PERSONAL HISTORY OF OTHER DISEASES OF THE NERVOUS SYSTEM AND SENSE ORGANS: Chronic | ICD-10-CM

## 2021-04-03 DIAGNOSIS — E11.621 TYPE 2 DIABETES MELLITUS WITH FOOT ULCER: ICD-10-CM

## 2021-04-03 DIAGNOSIS — Z79.4 LONG TERM (CURRENT) USE OF INSULIN: ICD-10-CM

## 2021-04-03 DIAGNOSIS — M75.00 ADHESIVE CAPSULITIS OF UNSPECIFIED SHOULDER: Chronic | ICD-10-CM

## 2021-04-03 DIAGNOSIS — L97.416 NON-PRESSURE CHRONIC ULCER OF RIGHT HEEL AND MIDFOOT WITH BONE INVOLVEMENT WITHOUT EVIDENCE OF NECROSIS: ICD-10-CM

## 2021-04-03 DIAGNOSIS — Z98.890 OTHER SPECIFIED POSTPROCEDURAL STATES: Chronic | ICD-10-CM

## 2021-04-03 DIAGNOSIS — S02.91XA UNSPECIFIED FRACTURE OF SKULL, INITIAL ENCOUNTER FOR CLOSED FRACTURE: Chronic | ICD-10-CM

## 2021-04-03 PROCEDURE — 82962 GLUCOSE BLOOD TEST: CPT

## 2021-04-03 PROCEDURE — G0277: CPT

## 2021-04-03 PROCEDURE — 99183 HYPERBARIC OXYGEN THERAPY: CPT

## 2021-04-04 DIAGNOSIS — L97.416 NON-PRESSURE CHRONIC ULCER OF RIGHT HEEL AND MIDFOOT WITH BONE INVOLVEMENT WITHOUT EVIDENCE OF NECROSIS: ICD-10-CM

## 2021-04-04 DIAGNOSIS — E11.621 TYPE 2 DIABETES MELLITUS WITH FOOT ULCER: ICD-10-CM

## 2021-04-04 DIAGNOSIS — Z79.82 LONG TERM (CURRENT) USE OF ASPIRIN: ICD-10-CM

## 2021-04-04 DIAGNOSIS — Z79.899 OTHER LONG TERM (CURRENT) DRUG THERAPY: ICD-10-CM

## 2021-04-04 DIAGNOSIS — Z98.890 OTHER SPECIFIED POSTPROCEDURAL STATES: ICD-10-CM

## 2021-04-04 DIAGNOSIS — Z88.0 ALLERGY STATUS TO PENICILLIN: ICD-10-CM

## 2021-04-04 DIAGNOSIS — E11.40 TYPE 2 DIABETES MELLITUS WITH DIABETIC NEUROPATHY, UNSPECIFIED: ICD-10-CM

## 2021-04-04 DIAGNOSIS — Z83.3 FAMILY HISTORY OF DIABETES MELLITUS: ICD-10-CM

## 2021-04-04 DIAGNOSIS — Z20.822 CONTACT WITH AND (SUSPECTED) EXPOSURE TO COVID-19: ICD-10-CM

## 2021-04-04 DIAGNOSIS — E66.01 MORBID (SEVERE) OBESITY DUE TO EXCESS CALORIES: ICD-10-CM

## 2021-04-04 DIAGNOSIS — Z79.4 LONG TERM (CURRENT) USE OF INSULIN: ICD-10-CM

## 2021-04-04 DIAGNOSIS — Z82.5 FAMILY HISTORY OF ASTHMA AND OTHER CHRONIC LOWER RESPIRATORY DISEASES: ICD-10-CM

## 2021-04-04 DIAGNOSIS — Z98.49 CATARACT EXTRACTION STATUS, UNSPECIFIED EYE: ICD-10-CM

## 2021-04-05 ENCOUNTER — APPOINTMENT (OUTPATIENT)
Dept: HYPERBARIC MEDICINE | Facility: HOSPITAL | Age: 71
End: 2021-04-05
Payer: MEDICARE

## 2021-04-05 ENCOUNTER — OUTPATIENT (OUTPATIENT)
Dept: OUTPATIENT SERVICES | Facility: HOSPITAL | Age: 71
LOS: 1 days | Discharge: HOME | End: 2021-04-05
Payer: MEDICARE

## 2021-04-05 ENCOUNTER — NON-APPOINTMENT (OUTPATIENT)
Age: 71
End: 2021-04-05

## 2021-04-05 VITALS
BODY MASS INDEX: 41.02 KG/M2 | OXYGEN SATURATION: 97 % | HEART RATE: 69 BPM | SYSTOLIC BLOOD PRESSURE: 153 MMHG | DIASTOLIC BLOOD PRESSURE: 66 MMHG | WEIGHT: 293 LBS | HEIGHT: 71 IN | RESPIRATION RATE: 20 BRPM | TEMPERATURE: 97.6 F

## 2021-04-05 VITALS
HEART RATE: 67 BPM | WEIGHT: 293 LBS | RESPIRATION RATE: 20 BRPM | SYSTOLIC BLOOD PRESSURE: 144 MMHG | HEIGHT: 71 IN | BODY MASS INDEX: 41.02 KG/M2 | OXYGEN SATURATION: 98 % | TEMPERATURE: 97.2 F | DIASTOLIC BLOOD PRESSURE: 85 MMHG

## 2021-04-05 DIAGNOSIS — E11.621 TYPE 2 DIABETES MELLITUS WITH FOOT ULCER: ICD-10-CM

## 2021-04-05 DIAGNOSIS — Z79.4 LONG TERM (CURRENT) USE OF INSULIN: ICD-10-CM

## 2021-04-05 DIAGNOSIS — Z98.890 OTHER SPECIFIED POSTPROCEDURAL STATES: Chronic | ICD-10-CM

## 2021-04-05 DIAGNOSIS — Z86.69 PERSONAL HISTORY OF OTHER DISEASES OF THE NERVOUS SYSTEM AND SENSE ORGANS: Chronic | ICD-10-CM

## 2021-04-05 DIAGNOSIS — L97.416 NON-PRESSURE CHRONIC ULCER OF RIGHT HEEL AND MIDFOOT WITH BONE INVOLVEMENT WITHOUT EVIDENCE OF NECROSIS: ICD-10-CM

## 2021-04-05 DIAGNOSIS — S02.91XA UNSPECIFIED FRACTURE OF SKULL, INITIAL ENCOUNTER FOR CLOSED FRACTURE: Chronic | ICD-10-CM

## 2021-04-05 DIAGNOSIS — M75.00 ADHESIVE CAPSULITIS OF UNSPECIFIED SHOULDER: Chronic | ICD-10-CM

## 2021-04-05 PROCEDURE — 82962 GLUCOSE BLOOD TEST: CPT

## 2021-04-05 PROCEDURE — 99183 HYPERBARIC OXYGEN THERAPY: CPT

## 2021-04-05 PROCEDURE — G0277: CPT

## 2021-04-06 ENCOUNTER — OUTPATIENT (OUTPATIENT)
Dept: OUTPATIENT SERVICES | Facility: HOSPITAL | Age: 71
LOS: 1 days | Discharge: HOME | End: 2021-04-06
Payer: MEDICARE

## 2021-04-06 ENCOUNTER — APPOINTMENT (OUTPATIENT)
Dept: HYPERBARIC MEDICINE | Facility: HOSPITAL | Age: 71
End: 2021-04-06
Payer: MEDICARE

## 2021-04-06 VITALS
HEART RATE: 62 BPM | OXYGEN SATURATION: 100 % | RESPIRATION RATE: 20 BRPM | SYSTOLIC BLOOD PRESSURE: 139 MMHG | TEMPERATURE: 98.1 F | DIASTOLIC BLOOD PRESSURE: 55 MMHG

## 2021-04-06 VITALS
RESPIRATION RATE: 20 BRPM | SYSTOLIC BLOOD PRESSURE: 139 MMHG | DIASTOLIC BLOOD PRESSURE: 55 MMHG | HEART RATE: 71 BPM | OXYGEN SATURATION: 97 % | TEMPERATURE: 97.9 F

## 2021-04-06 DIAGNOSIS — Z79.4 LONG TERM (CURRENT) USE OF INSULIN: ICD-10-CM

## 2021-04-06 DIAGNOSIS — E11.621 TYPE 2 DIABETES MELLITUS WITH FOOT ULCER: ICD-10-CM

## 2021-04-06 DIAGNOSIS — S02.91XA UNSPECIFIED FRACTURE OF SKULL, INITIAL ENCOUNTER FOR CLOSED FRACTURE: Chronic | ICD-10-CM

## 2021-04-06 DIAGNOSIS — Z86.69 PERSONAL HISTORY OF OTHER DISEASES OF THE NERVOUS SYSTEM AND SENSE ORGANS: Chronic | ICD-10-CM

## 2021-04-06 DIAGNOSIS — M75.00 ADHESIVE CAPSULITIS OF UNSPECIFIED SHOULDER: Chronic | ICD-10-CM

## 2021-04-06 DIAGNOSIS — Z98.890 OTHER SPECIFIED POSTPROCEDURAL STATES: Chronic | ICD-10-CM

## 2021-04-06 DIAGNOSIS — L97.416 NON-PRESSURE CHRONIC ULCER OF RIGHT HEEL AND MIDFOOT WITH BONE INVOLVEMENT WITHOUT EVIDENCE OF NECROSIS: ICD-10-CM

## 2021-04-06 PROCEDURE — 82962 GLUCOSE BLOOD TEST: CPT

## 2021-04-06 PROCEDURE — G0277: CPT

## 2021-04-06 PROCEDURE — 99183 HYPERBARIC OXYGEN THERAPY: CPT

## 2021-04-06 NOTE — REASON FOR VISIT
[FreeTextEntry5] : Right Plantar Foot 1st Met [FreeTextEntry4] : DFU 3 clean , closing , plantar right sub 1st metatarsal

## 2021-04-06 NOTE — PHYSICAL EXAM
[4 x 4] : 4 x 4  [Abdominal Pad] : Abdominal Pad [1+] : left 1+ [No Rash or Lesion] : No rash or lesion [Purpura] : no purpura  [Petechiae] : no petechiae [Skin Ulcer] : ulcer [Alert] : alert [Oriented to Person] : oriented to person [Oriented to Place] : oriented to place [Oriented to Time] : oriented to time [Calm] : calm [de-identified] : calm [de-identified] : HTN, HLD , morbid obesity  [de-identified] : DFU 3 plantar right 1st met , clean , granular  [de-identified] : Diabetic neuropathy  [de-identified] : Circ neurovascular function WNL post Ace wraps, pt expressed comfort.\par Apligraf 44 sq cm\par 100% Used 0% Discarded \par [id number]\par Exp: 04/09/2021\par pH 7.3-7.5\par Lot: [id number]\par \par  [FreeTextEntry1] : Plantar Foot 1 st met [FreeTextEntry2] : 2.0 [FreeTextEntry3] : 1.0 [FreeTextEntry4] : 0.1-0.2 [de-identified] : serosanguineous [de-identified] : 1.7cm @ 11 o'clock [de-identified] : 0.2-0.3 cm @ 8-9 o'clock [de-identified] : callus [de-identified] : 90-95% [de-identified] : 5-10% [FreeTextEntry5] : Post Debridement measurements- 2.1 x 1.1 x 0.2-0.3 [de-identified] : Apligraf 44 sq cm- 100% Implanted 0% Discarded [de-identified] :  to hold dressing in place [de-identified] : Wound Veil and Calcium Alginate [de-identified] : Cleansed with Normal saline\par Reconstituted in NS\par Lot: [id number]\par Exp: 05/01/2023\par \par  [TWNoteComboBox1] : Right [TWNoteComboBox5] : Yes [TWNoteComboBox4] : Small [TWNoteComboBox6] : Surgical [de-identified] : Yes [de-identified] : other [de-identified] : None [de-identified] : None [de-identified] : >75% [de-identified] : Yes [de-identified] : 2.5% Lidocaine Topical [TWNoteComboBox7] : Arlin [de-identified] : Application of skin substitute [de-identified] : Ace wraps [de-identified] : Weekly [de-identified] : Primary Dressing

## 2021-04-06 NOTE — PROCEDURE
[Saline] : saline [Scalpel] : scalpel [Other: ___] : [unfilled] [Fenestrated] : fenestrated [Hydrated with saline] : hydrated with saline [Apligraf] : apligraf [____ % was used] : and [unfilled] % was used [FreeTextEntry1] : alginate  [] : Yes [FreeTextEntry9] : 1069 [de-identified] : DFU 3 right  [de-identified] : Juan Carlos [FreeTextEntry6] : DFU 3 right  [FreeTextEntry7] : same [de-identified] : 0 [de-identified] : 2cc [de-identified] : kiet

## 2021-04-06 NOTE — ASSESSMENT
[Verbal] : Verbal [Demo] : Demo [Patient] : Patient [Good - alert, interested, motivated] : Good - alert, interested, motivated [Verbalizes knowledge/Understanding] : Verbalizes knowledge/understanding [Dressing changes] : dressing changes [Foot Care] : foot care [Skin Care] : skin care [Pressure relief] : pressure relief [Signs and symptoms of infection] : sign and symptoms of infection [How and When to Call] : how and when to call [Hyperbaric Therapy] : hyperbaric therapy [Off-loading] : off-loading [Compression Therapy] : compression therapy [Patient responsibility to plan of care] : patient responsibility to plan of care [Stable] : stable [Home] : Home [Wheelchair] : Wheelchair [Not Applicable - Long Term Care/Home Health Agency] : Long Term Care/Home Health Agency: Not Applicable [Nutrition] : nutrition [Pain Management] : pain management [Glycemic Control] : glycemic control [] : No [FreeTextEntry2] : Restore Skin Integrity\par Infection Control\par Offloading\par Compression Therapy\par Localized wound care\par Maintain acceptable pain levels at satisfactory relief.\par Demonstrates use of both nonpharmacological and pharmacological pain relief strategies\par Weight reduction Strategies\par Encourage Glycemic Control\par Pressure Relief [FreeTextEntry4] : Covid 19 PCR obtained today\par F/U 1 week for assessment and daily for HBOT\par Preauthorization for sharps debridement and apligraf #8 submitted for next assessment\par HBOT 36/40 completed;No additional treatments ordered as per DPM

## 2021-04-07 ENCOUNTER — OUTPATIENT (OUTPATIENT)
Dept: OUTPATIENT SERVICES | Facility: HOSPITAL | Age: 71
LOS: 1 days | Discharge: HOME | End: 2021-04-07
Payer: MEDICARE

## 2021-04-07 ENCOUNTER — APPOINTMENT (OUTPATIENT)
Dept: HYPERBARIC MEDICINE | Facility: HOSPITAL | Age: 71
End: 2021-04-07
Payer: MEDICARE

## 2021-04-07 VITALS
DIASTOLIC BLOOD PRESSURE: 70 MMHG | TEMPERATURE: 97.1 F | RESPIRATION RATE: 18 BRPM | SYSTOLIC BLOOD PRESSURE: 135 MMHG | OXYGEN SATURATION: 98 % | HEART RATE: 62 BPM

## 2021-04-07 VITALS
TEMPERATURE: 96.8 F | HEART RATE: 70 BPM | OXYGEN SATURATION: 97 % | DIASTOLIC BLOOD PRESSURE: 61 MMHG | RESPIRATION RATE: 18 BRPM | SYSTOLIC BLOOD PRESSURE: 133 MMHG

## 2021-04-07 DIAGNOSIS — Z86.69 PERSONAL HISTORY OF OTHER DISEASES OF THE NERVOUS SYSTEM AND SENSE ORGANS: Chronic | ICD-10-CM

## 2021-04-07 DIAGNOSIS — M75.00 ADHESIVE CAPSULITIS OF UNSPECIFIED SHOULDER: Chronic | ICD-10-CM

## 2021-04-07 DIAGNOSIS — Z98.890 OTHER SPECIFIED POSTPROCEDURAL STATES: Chronic | ICD-10-CM

## 2021-04-07 DIAGNOSIS — L97.416 NON-PRESSURE CHRONIC ULCER OF RIGHT HEEL AND MIDFOOT WITH BONE INVOLVEMENT WITHOUT EVIDENCE OF NECROSIS: ICD-10-CM

## 2021-04-07 DIAGNOSIS — E11.621 TYPE 2 DIABETES MELLITUS WITH FOOT ULCER: ICD-10-CM

## 2021-04-07 DIAGNOSIS — S02.91XA UNSPECIFIED FRACTURE OF SKULL, INITIAL ENCOUNTER FOR CLOSED FRACTURE: Chronic | ICD-10-CM

## 2021-04-07 DIAGNOSIS — Z79.4 LONG TERM (CURRENT) USE OF INSULIN: ICD-10-CM

## 2021-04-07 PROCEDURE — 82962 GLUCOSE BLOOD TEST: CPT

## 2021-04-07 PROCEDURE — G0277: CPT

## 2021-04-07 PROCEDURE — 99183 HYPERBARIC OXYGEN THERAPY: CPT

## 2021-04-07 NOTE — ADDENDUM
[FreeTextEntry1] : Pt BGL low. RN notified. Pt was given 15g of glucose via gel. Pt was rested and retested. Pt BGL still . DPM notified. as per DPM pt to dive while taking 16 g of glucose via 4 glucose tablets throughout  tx. Pt was advised to take one glucose tablet every 30 mins while in chamber. \par Pt descended to 2.0 EULALIO @ 1.75 PSI/min without incident in chamber #3\par Pt resting @ depth with chest rise and fall observed throughout tx. \par Pt ascended from 2.0 EULALIO @ 1.75 PSI/min without incident. \par Pt tolerated tx well. pt Recieved Ace wrap ny LPN post tx. \par

## 2021-04-07 NOTE — PROCEDURE
[Outpatient] : Outpatient [Ambulatory] : Patient is ambulatory. [Cane] : cane [THIS CHAMBER HAS BEEN CLEANED / DISINFECTED] : This chamber has been cleaned / disinfected according to local and hospital policy and procedure prior to this treatment. [Patient demonstrated and verbalized proper technique for using air break mask] : Patient demonstrated and verbalized proper technique for using air break mask [Patient educated on the risks of SMOKING prior to HBOT with understanding] : Patient educated on the risks of SMOKING prior to HBOT with understanding [Patient educated on the risks of CONSUMING ALCOHOL prior to HBOT with understanding] : Patient educated on the risks of CONSUMING ALCOHOL prior to HBOT with understanding [100% Cotton] : 100% cotton [Empty all pockets] : empty all pockets [No hair oils, wigs, hairpieces, pins] : no hair oils, wigs, hairpieces, pins  [Pre tx medications] : pre tx medications  [No make-up, creams] : no make-up, creams  [No jewelry] : no jewelry  [No matches, cigarettes, lighters] : no matches, cigarettes, lighters  [Hearing aid removed] : hearing aid removed [Dentures removed] : dentures removed [Ground bracelet on pt's wrist] : ground bracelet on pt's wrist  [Contacts removed] : contacts removed  [Remove nail polish] : remove nail polish  [No reading material] : no reading material  [Bra, undergarments removed] : bra, undergarments removed  [No contraindicated dressings] : no contraindicated dressings [Ground Wire - VISUAL Verification - Intact/Free of Obstruction] : Ground Wire - VISUAL Verification - Intact/Free of Obstruction  [Ground Continuity - Verified < 1 ohm w/ Wrist Strap Channing] : Ground Continuity - Verified < 1 ohm w/ Wrist Strap Channing [Clear all fields] : clear all fields [Number: ___] : Number: [unfilled] [Diagnosis: ___] : Diagnosis: [unfilled] [____] : Post-Dive: Time - [unfilled] [___] : Post-Dive: Value - [unfilled] mg/dL [] : No [FreeTextEntry3] : 90 [FreeTextEntry5] : 9847 [FreeTextEntry7] : 6458 [FreeTextEntry9] : 8832 [de-identified] : 6309 [de-identified] : 108

## 2021-04-07 NOTE — ADDENDUM
[FreeTextEntry1] : DRAINAGE NOTED ON PT'S DRESSING PRIOR TO DESCENT. LPN NOTIFIED. PT RECEIVED DRESSING CHANGE PRIOR TO DESCENT.\par PT DESCENDED TO 2.0 EULALIO @ 1.75 PSI/MIN WITHOUT INCIDENT IN CHAMBER # 4. PT'S RESTING AT TX DEPTH WITH CHEST RISE AND FALL OBSERVED CHAMBER SIDE.\par CARE TRANSFERRED TO TREATING TECH @ 1200 PM \par PT ASCENDED FROM TX DEPTH WITHOUT INCIDENT IN CHAMBER #4\par PT TOLERATED TX WELL

## 2021-04-07 NOTE — PROCEDURE
[Outpatient] : Outpatient [Ambulatory] : Patient is ambulatory. [THIS CHAMBER HAS BEEN CLEANED / DISINFECTED] : This chamber has been cleaned / disinfected according to local and hospital policy and procedure prior to this treatment. [____] : Post-Dive: Time - [unfilled] [___] : Post-Dive: Value - [unfilled] mg/dL [Patient educated on the risks of SMOKING prior to HBOT with understanding] : Patient educated on the risks of SMOKING prior to HBOT with understanding [Patient demonstrated and verbalized proper technique for using air break mask] : Patient demonstrated and verbalized proper technique for using air break mask [Patient educated on the risks of CONSUMING ALCOHOL prior to HBOT with understanding] : Patient educated on the risks of CONSUMING ALCOHOL prior to HBOT with understanding [100% Cotton] : 100% cotton [Empty all pockets] : empty all pockets [No hair oils, wigs, hairpieces, pins] : no hair oils, wigs, hairpieces, pins  [Pre tx medications] : pre tx medications  [No make-up, creams] : no make-up, creams  [No jewelry] : no jewelry  [No matches, cigarettes, lighters] : no matches, cigarettes, lighters  [Hearing aid removed] : hearing aid removed [Dentures removed] : dentures removed [Ground bracelet on pt's wrist] : ground bracelet on pt's wrist  [Contacts removed] : contacts removed  [Remove nail polish] : remove nail polish  [No reading material] : no reading material  [Bra, undergarments removed] : bra, undergarments removed  [No contraindicated dressings] : no contraindicated dressings [Ground Wire - VISUAL Verification - Intact/Free of Obstruction] : Ground Wire - VISUAL Verification - Intact/Free of Obstruction  [Ground Continuity - Verified < 1 ohm w/ Wrist Strap Channing] : Ground Continuity - Verified < 1 ohm w/ Wrist Strap Channing [Diagnosis: ___] : Diagnosis: [unfilled] [Number: ___] : Number: [unfilled] [Clear all fields] : clear all fields [] : No [FreeTextEntry3] : 90 [FreeTextEntry5] : 9360 [FreeTextEntry7] : 6469 [FreeTextEntry9] : 1126 [de-identified] : 2567 [de-identified] : 108 mins

## 2021-04-07 NOTE — PROCEDURE
[Outpatient] : Outpatient [Wheelchair] : wheelchair [THIS CHAMBER HAS BEEN CLEANED / DISINFECTED] : This chamber has been cleaned / disinfected according to local and hospital policy and procedure prior to this treatment. [____] : Recheck: Time - [unfilled] [___] : Recheck: Value - [unfilled] mg/dL [Patient demonstrated and verbalized proper technique for using air break mask] : Patient demonstrated and verbalized proper technique for using air break mask [Patient educated on the risks of SMOKING prior to HBOT with understanding] : Patient educated on the risks of SMOKING prior to HBOT with understanding [Patient educated on the risks of CONSUMING ALCOHOL prior to HBOT with understanding] : Patient educated on the risks of CONSUMING ALCOHOL prior to HBOT with understanding [100% Cotton] : 100% cotton [Empty all pockets] : empty all pockets [No hair oils, wigs, hairpieces, pins] : no hair oils, wigs, hairpieces, pins  [Pre tx medications] : pre tx medications  [No make-up, creams] : no make-up, creams  [No jewelry] : no jewelry  [No matches, cigarettes, lighters] : no matches, cigarettes, lighters  [Hearing aid removed] : hearing aid removed [Dentures removed] : dentures removed [Ground bracelet on pt's wrist] : ground bracelet on pt's wrist  [Contacts removed] : contacts removed  [Remove nail polish] : remove nail polish  [No reading material] : no reading material  [Bra, undergarments removed] : bra, undergarments removed  [No contraindicated dressings] : no contraindicated dressings [Ground Wire - VISUAL Verification - Intact/Free of Obstruction] : Ground Wire - VISUAL Verification - Intact/Free of Obstruction  [Ground Continuity - Verified < 1 ohm w/ Wrist Strap Channing] : Ground Continuity - Verified < 1 ohm w/ Wrist Strap Channing [Diagnosis: ___] : Diagnosis: [unfilled] [Number: ___] : Number: [unfilled] [Clear all fields] : clear all fields [] : No [FreeTextEntry3] : 90 [FreeTextEntry5] : 1006 [FreeTextEntry9] : 7613 [FreeTextEntry7] : 1013 [de-identified] : 7644 [de-identified] : 108 miinutes

## 2021-04-07 NOTE — PROCEDURE
[Outpatient] : Outpatient [Ambulatory] : Patient is ambulatory. [Cane] : cane [THIS CHAMBER HAS BEEN CLEANED / DISINFECTED] : This chamber has been cleaned / disinfected according to local and hospital policy and procedure prior to this treatment. [Patient demonstrated and verbalized proper technique for using air break mask] : Patient demonstrated and verbalized proper technique for using air break mask [Patient educated on the risks of SMOKING prior to HBOT with understanding] : Patient educated on the risks of SMOKING prior to HBOT with understanding [Patient educated on the risks of CONSUMING ALCOHOL prior to HBOT with understanding] : Patient educated on the risks of CONSUMING ALCOHOL prior to HBOT with understanding [100% Cotton] : 100% cotton [Empty all pockets] : empty all pockets [No hair oils, wigs, hairpieces, pins] : no hair oils, wigs, hairpieces, pins  [Pre tx medications] : pre tx medications  [No make-up, creams] : no make-up, creams  [No jewelry] : no jewelry  [No matches, cigarettes, lighters] : no matches, cigarettes, lighters  [Hearing aid removed] : hearing aid removed [Dentures removed] : dentures removed [Ground bracelet on pt's wrist] : ground bracelet on pt's wrist  [Contacts removed] : contacts removed  [Remove nail polish] : remove nail polish  [No reading material] : no reading material  [Bra, undergarments removed] : bra, undergarments removed  [No contraindicated dressings] : no contraindicated dressings [Ground Wire - VISUAL Verification - Intact/Free of Obstruction] : Ground Wire - VISUAL Verification - Intact/Free of Obstruction  [Ground Continuity - Verified < 1 ohm w/ Wrist Strap Channing] : Ground Continuity - Verified < 1 ohm w/ Wrist Strap Channing [Clear all fields] : clear all fields [Number: ___] : Number: [unfilled] [Diagnosis: ___] : Diagnosis: [unfilled] [____] : Post-Dive: Time - [unfilled] [___] : Post-Dive: Value - [unfilled] mg/dL [] : No [FreeTextEntry3] : 90 [FreeTextEntry5] : 0684 [[facility] Cent[facility]Entry7] : 7309 [[facility]pletEntry9] : 9294 [de-identified] : 0312 [de-identified] : 108mins

## 2021-04-07 NOTE — PROCEDURE
[Outpatient] : Outpatient [Ambulatory] : Patient is ambulatory. [Wheelchair] : wheelchair [THIS CHAMBER HAS BEEN CLEANED / DISINFECTED] : This chamber has been cleaned / disinfected according to local and hospital policy and procedure prior to this treatment. [Patient demonstrated and verbalized proper technique for using air break mask] : Patient demonstrated and verbalized proper technique for using air break mask [Patient educated on the risks of SMOKING prior to HBOT with understanding] : Patient educated on the risks of SMOKING prior to HBOT with understanding [Patient educated on the risks of CONSUMING ALCOHOL prior to HBOT with understanding] : Patient educated on the risks of CONSUMING ALCOHOL prior to HBOT with understanding [100% Cotton] : 100% cotton [Empty all pockets] : empty all pockets [No hair oils, wigs, hairpieces, pins] : no hair oils, wigs, hairpieces, pins  [Pre tx medications] : pre tx medications  [No make-up, creams] : no make-up, creams  [No jewelry] : no jewelry  [No matches, cigarettes, lighters] : no matches, cigarettes, lighters  [Hearing aid removed] : hearing aid removed [Dentures removed] : dentures removed [Ground bracelet on pt's wrist] : ground bracelet on pt's wrist  [Contacts removed] : contacts removed  [Remove nail polish] : remove nail polish  [No reading material] : no reading material  [Bra, undergarments removed] : bra, undergarments removed  [No contraindicated dressings] : no contraindicated dressings [Ground Wire - VISUAL Verification - Intact/Free of Obstruction] : Ground Wire - VISUAL Verification - Intact/Free of Obstruction  [Ground Continuity - Verified < 1 ohm w/ Wrist Strap Channing] : Ground Continuity - Verified < 1 ohm w/ Wrist Strap Channing [Number: ___] : Number: [unfilled] [Diagnosis: ___] : Diagnosis: [unfilled] [____] : Post-Dive: Time - [unfilled] [___] : Post-Dive: Value - [unfilled] mg/dL [Clear all fields] : clear all fields [] : No [FreeTextEntry3] : 90 [FreeTextEntry5] : 3739 [FreeTextEntry7] : 6808 [FreeTextEntry9] : 7199 [de-identified] : 3836 [de-identified] : 108 MIN

## 2021-04-07 NOTE — ADDENDUM
[FreeTextEntry1] : Pt descended to 2.0 EULALIO @ 1.75 PSI/MIN without incident in chamber #2\par Pt resting comfortably @ depth, with equal chest rise observed throughout tx.\par Pt ascended from 2.0 EULALIO @ 1.75 PSI/MIN without incident\par Pt tolerated treatment well. Pt Recieved Ace wrap by LPN post tx. \par

## 2021-04-07 NOTE — PROCEDURE
[Outpatient] : Outpatient [Ambulatory] : Patient is ambulatory. [Wheelchair] : wheelchair [THIS CHAMBER HAS BEEN CLEANED / DISINFECTED] : This chamber has been cleaned / disinfected according to local and hospital policy and procedure prior to this treatment. [Patient demonstrated and verbalized proper technique for using air break mask] : Patient demonstrated and verbalized proper technique for using air break mask [Patient educated on the risks of SMOKING prior to HBOT with understanding] : Patient educated on the risks of SMOKING prior to HBOT with understanding [Patient educated on the risks of CONSUMING ALCOHOL prior to HBOT with understanding] : Patient educated on the risks of CONSUMING ALCOHOL prior to HBOT with understanding [100% Cotton] : 100% cotton [Empty all pockets] : empty all pockets [No hair oils, wigs, hairpieces, pins] : no hair oils, wigs, hairpieces, pins  [Pre tx medications] : pre tx medications  [No make-up, creams] : no make-up, creams  [No jewelry] : no jewelry  [No matches, cigarettes, lighters] : no matches, cigarettes, lighters  [Hearing aid removed] : hearing aid removed [Dentures removed] : dentures removed [Ground bracelet on pt's wrist] : ground bracelet on pt's wrist  [Contacts removed] : contacts removed  [Remove nail polish] : remove nail polish  [No reading material] : no reading material  [Bra, undergarments removed] : bra, undergarments removed  [No contraindicated dressings] : no contraindicated dressings [Ground Wire - VISUAL Verification - Intact/Free of Obstruction] : Ground Wire - VISUAL Verification - Intact/Free of Obstruction  [Ground Continuity - Verified < 1 ohm w/ Wrist Strap Channing] : Ground Continuity - Verified < 1 ohm w/ Wrist Strap Channing [Number: ___] : Number: [unfilled] [Diagnosis: ___] : Diagnosis: [unfilled] [____] : Post-Dive: Time - [unfilled] [___] : Post-Dive: Value - [unfilled] mg/dL [Clear all fields] : clear all fields [] : No [FreeTextEntry3] : 90 [FreeTextEntry5] : 1274 [FreeTextEntry7] : 1200 [FreeTextEntry9] : 2190 [de-identified] : 6149 [de-identified] : 108 minutes

## 2021-04-07 NOTE — ADDENDUM
[FreeTextEntry1] : Pt descended to 2.0 EULALIO @ 1.75 PSI/MIN without incident in chamber #3.\par Pt resting comfortably @ depth, with equal chest rise observed throughout tx.\par Pt care transferred to HT. \par PT ASCENDED FROM TX DEPTH WITHOUT INCIDENT. PT TOLERATED TX WELL.

## 2021-04-07 NOTE — ADDENDUM
[FreeTextEntry1] : Pt descended to 2.0 EULALIO @ 1.75 PSI/MIN without incident in chamber #3\par Pt resting comfortably @ depth, with equal chest rise observed throughout Treatment.\par Pt ascended from 2.0 EULALIO @ 1.75 PSI/MIN without incident\par Pt tolerated treatment well. Pt Recieved Ace wrap by LPN post Treatment. \par

## 2021-04-08 ENCOUNTER — OUTPATIENT (OUTPATIENT)
Dept: OUTPATIENT SERVICES | Facility: HOSPITAL | Age: 71
LOS: 1 days | Discharge: HOME | End: 2021-04-08
Payer: MEDICARE

## 2021-04-08 ENCOUNTER — APPOINTMENT (OUTPATIENT)
Dept: HYPERBARIC MEDICINE | Facility: HOSPITAL | Age: 71
End: 2021-04-08
Payer: MEDICARE

## 2021-04-08 VITALS
TEMPERATURE: 97.1 F | HEART RATE: 63 BPM | DIASTOLIC BLOOD PRESSURE: 65 MMHG | SYSTOLIC BLOOD PRESSURE: 145 MMHG | OXYGEN SATURATION: 100 % | RESPIRATION RATE: 18 BRPM

## 2021-04-08 VITALS
OXYGEN SATURATION: 100 % | SYSTOLIC BLOOD PRESSURE: 144 MMHG | DIASTOLIC BLOOD PRESSURE: 63 MMHG | HEART RATE: 67 BPM | RESPIRATION RATE: 18 BRPM | TEMPERATURE: 97 F

## 2021-04-08 DIAGNOSIS — Z86.69 PERSONAL HISTORY OF OTHER DISEASES OF THE NERVOUS SYSTEM AND SENSE ORGANS: Chronic | ICD-10-CM

## 2021-04-08 DIAGNOSIS — E11.621 TYPE 2 DIABETES MELLITUS WITH FOOT ULCER: ICD-10-CM

## 2021-04-08 DIAGNOSIS — Z98.890 OTHER SPECIFIED POSTPROCEDURAL STATES: Chronic | ICD-10-CM

## 2021-04-08 DIAGNOSIS — M75.00 ADHESIVE CAPSULITIS OF UNSPECIFIED SHOULDER: Chronic | ICD-10-CM

## 2021-04-08 DIAGNOSIS — L97.416 NON-PRESSURE CHRONIC ULCER OF RIGHT HEEL AND MIDFOOT WITH BONE INVOLVEMENT WITHOUT EVIDENCE OF NECROSIS: ICD-10-CM

## 2021-04-08 DIAGNOSIS — Z79.4 LONG TERM (CURRENT) USE OF INSULIN: ICD-10-CM

## 2021-04-08 DIAGNOSIS — S02.91XA UNSPECIFIED FRACTURE OF SKULL, INITIAL ENCOUNTER FOR CLOSED FRACTURE: Chronic | ICD-10-CM

## 2021-04-08 PROCEDURE — 99183 HYPERBARIC OXYGEN THERAPY: CPT

## 2021-04-08 PROCEDURE — G0277: CPT

## 2021-04-08 PROCEDURE — 82962 GLUCOSE BLOOD TEST: CPT

## 2021-04-08 NOTE — ADDENDUM
[FreeTextEntry1] : PT DESCENDED TO 2.0 EULALIO @ 1.75 PSI/MIN WITHOUT INCIDENT IN CHAMBER # 4. PT'S RESTING AT TX DEPTH WITH LEGS ELEVATED. CHEST RISE AND FALL OBSERVED CHAMBER SIDE.\par PT ASCENDED FROM TX DEPTH WITHOUT INCIDENT. PT TOLERATED TX WELL.\par \par ** PT NOTIFIED OF ASSESSMENT TOMORROW WITH APPROPRIATE ARRIVAL TIME **

## 2021-04-08 NOTE — PROCEDURE
[Outpatient] : Outpatient [Ambulatory] : Patient is ambulatory. [Cane] : cane [Wheelchair] : wheelchair [THIS CHAMBER HAS BEEN CLEANED / DISINFECTED] : This chamber has been cleaned / disinfected according to local and hospital policy and procedure prior to this treatment. [Patient demonstrated and verbalized proper technique for using air break mask] : Patient demonstrated and verbalized proper technique for using air break mask [Patient educated on the risks of SMOKING prior to HBOT with understanding] : Patient educated on the risks of SMOKING prior to HBOT with understanding [Patient educated on the risks of CONSUMING ALCOHOL prior to HBOT with understanding] : Patient educated on the risks of CONSUMING ALCOHOL prior to HBOT with understanding [100% Cotton] : 100% cotton [Empty all pockets] : empty all pockets [No hair oils, wigs, hairpieces, pins] : no hair oils, wigs, hairpieces, pins  [Pre tx medications] : pre tx medications  [No make-up, creams] : no make-up, creams  [No jewelry] : no jewelry  [No matches, cigarettes, lighters] : no matches, cigarettes, lighters  [Hearing aid removed] : hearing aid removed [Dentures removed] : dentures removed [Ground bracelet on pt's wrist] : ground bracelet on pt's wrist  [Contacts removed] : contacts removed  [Remove nail polish] : remove nail polish  [No reading material] : no reading material  [Bra, undergarments removed] : bra, undergarments removed  [No contraindicated dressings] : no contraindicated dressings [Ground Wire - VISUAL Verification - Intact/Free of Obstruction] : Ground Wire - VISUAL Verification - Intact/Free of Obstruction  [Ground Continuity - Verified < 1 ohm w/ Wrist Strap Channing] : Ground Continuity - Verified < 1 ohm w/ Wrist Strap Channing [Number: ___] : Number: [unfilled] [Diagnosis: ___] : Diagnosis: [unfilled] [____] : Post-Dive: Time - [unfilled] [___] : Post-Dive: Value - [unfilled] mg/dL [Clear all fields] : clear all fields [] : No [FreeTextEntry3] : 90 [[facility]2] : 5998 [[facility] Cent[facility]] : 6339 [FreeTextEntry9] : 5703 [de-identified] : 1037 [de-identified] : 108 minutes

## 2021-04-08 NOTE — ADDENDUM
[FreeTextEntry1] : PT RECEIVED OFF LOADING MEASURES PRE HBOT \par PT DESCENDED TO 2.0 EULALIO @ 1.75 PSI/MIN WITHOUT INCIDENT IN CHAMBER #3\par PT RESTING AT TX DEPTH WITH VISIBLE CHEST RISE AND FALL OBSERVED CHAMBERSIDE \par PT ASCENDED FROM TX DEPTH WITHOUT INCIDENT \par PT TOLERATED TX WELL

## 2021-04-08 NOTE — ADDENDUM
[FreeTextEntry1] : The pt presented to [facility] minimally ambulatory with cane + wheelchair and A&Ox3 for scheduled HBOT. \par The pt's pre-dive screening found strike-through on the pt's outer dressing.\par The pt received outer dressing bandage change prior to HBOT.\par The pt's pre-dive screening was found to be within acceptable limits to begin HBOT.\par The pt was provided lower extremity elevation measure to comfort prior to HBOT. \par The pt descended @ 1.75 PSI/min to Rx'd tx depth of 2.0 EULALIO in chamber # 1 without incident.\par The pt was observed with visible chest motion and without incident for the duration of HBOT. \par ** HBOT TX IN PROGRESS . **\par PT ASCENDED FROM TX DEPTH WITHOUT INCIDENT \par PT TOLERATED TX WELL

## 2021-04-08 NOTE — PROCEDURE
[Outpatient] : Outpatient [Wheelchair] : wheelchair [THIS CHAMBER HAS BEEN CLEANED / DISINFECTED] : This chamber has been cleaned / disinfected according to local and hospital policy and procedure prior to this treatment. [Patient demonstrated and verbalized proper technique for using air break mask] : Patient demonstrated and verbalized proper technique for using air break mask [Patient educated on the risks of SMOKING prior to HBOT with understanding] : Patient educated on the risks of SMOKING prior to HBOT with understanding [Patient educated on the risks of CONSUMING ALCOHOL prior to HBOT with understanding] : Patient educated on the risks of CONSUMING ALCOHOL prior to HBOT with understanding [100% Cotton] : 100% cotton [Empty all pockets] : empty all pockets [No hair oils, wigs, hairpieces, pins] : no hair oils, wigs, hairpieces, pins  [Pre tx medications] : pre tx medications  [No make-up, creams] : no make-up, creams  [No jewelry] : no jewelry  [No matches, cigarettes, lighters] : no matches, cigarettes, lighters  [Hearing aid removed] : hearing aid removed [Dentures removed] : dentures removed [Ground bracelet on pt's wrist] : ground bracelet on pt's wrist  [Contacts removed] : contacts removed  [Remove nail polish] : remove nail polish  [No reading material] : no reading material  [Bra, undergarments removed] : bra, undergarments removed  [No contraindicated dressings] : no contraindicated dressings [Ground Wire - VISUAL Verification - Intact/Free of Obstruction] : Ground Wire - VISUAL Verification - Intact/Free of Obstruction  [Ground Continuity - Verified < 1 ohm w/ Wrist Strap Channing] : Ground Continuity - Verified < 1 ohm w/ Wrist Strap Channing [Number: ___] : Number: [unfilled] [Diagnosis: ___] : Diagnosis: [unfilled] [____] : Post-Dive: Time - [unfilled] [___] : Post-Dive: Value - [unfilled] mg/dL [Clear all fields] : clear all fields [] : No [FreeTextEntry3] : 90 [FreeTextEntry5] : 4370 [FreeTextEntry7] : 8606 [FreeTextEntry9] : 5463 [de-identified] : 6640 [de-identified] : 108 MIN

## 2021-04-08 NOTE — PROCEDURE
[Outpatient] : Outpatient [Ambulatory] : Patient is ambulatory. [Cane] : cane [Wheelchair] : wheelchair [THIS CHAMBER HAS BEEN CLEANED / DISINFECTED] : This chamber has been cleaned / disinfected according to local and hospital policy and procedure prior to this treatment. [Patient demonstrated and verbalized proper technique for using air break mask] : Patient demonstrated and verbalized proper technique for using air break mask [Patient educated on the risks of SMOKING prior to HBOT with understanding] : Patient educated on the risks of SMOKING prior to HBOT with understanding [Patient educated on the risks of CONSUMING ALCOHOL prior to HBOT with understanding] : Patient educated on the risks of CONSUMING ALCOHOL prior to HBOT with understanding [100% Cotton] : 100% cotton [Empty all pockets] : empty all pockets [No hair oils, wigs, hairpieces, pins] : no hair oils, wigs, hairpieces, pins  [Pre tx medications] : pre tx medications  [No make-up, creams] : no make-up, creams  [No jewelry] : no jewelry  [No matches, cigarettes, lighters] : no matches, cigarettes, lighters  [Hearing aid removed] : hearing aid removed [Dentures removed] : dentures removed [Ground bracelet on pt's wrist] : ground bracelet on pt's wrist  [Contacts removed] : contacts removed  [Remove nail polish] : remove nail polish  [No reading material] : no reading material  [Bra, undergarments removed] : bra, undergarments removed  [No contraindicated dressings] : no contraindicated dressings [Ground Wire - VISUAL Verification - Intact/Free of Obstruction] : Ground Wire - VISUAL Verification - Intact/Free of Obstruction  [Ground Continuity - Verified < 1 ohm w/ Wrist Strap Channing] : Ground Continuity - Verified < 1 ohm w/ Wrist Strap Channing [Number: ___] : Number: [unfilled] [Diagnosis: ___] : Diagnosis: [unfilled] [____] : Post-Dive: Time - [unfilled] [___] : Post-Dive: Value - [unfilled] mg/dL [Clear all fields] : clear all fields [] : No [FreeTextEntry3] : 90 [FreeTextEntry5] : 12 : 03 [FreeTextEntry7] : 12 : 12  [FreeTextEntry9] : 13 : 42  [de-identified] : 13 : 51  [de-identified] : 108 minutes

## 2021-04-09 ENCOUNTER — APPOINTMENT (OUTPATIENT)
Dept: HYPERBARIC MEDICINE | Facility: HOSPITAL | Age: 71
End: 2021-04-09
Payer: MEDICARE

## 2021-04-09 ENCOUNTER — OUTPATIENT (OUTPATIENT)
Dept: OUTPATIENT SERVICES | Facility: HOSPITAL | Age: 71
LOS: 1 days | Discharge: HOME | End: 2021-04-09
Payer: MEDICARE

## 2021-04-09 VITALS
HEART RATE: 71 BPM | RESPIRATION RATE: 18 BRPM | BODY MASS INDEX: 41.02 KG/M2 | HEIGHT: 71 IN | DIASTOLIC BLOOD PRESSURE: 73 MMHG | OXYGEN SATURATION: 97 % | SYSTOLIC BLOOD PRESSURE: 161 MMHG | WEIGHT: 293 LBS

## 2021-04-09 DIAGNOSIS — Z98.890 OTHER SPECIFIED POSTPROCEDURAL STATES: Chronic | ICD-10-CM

## 2021-04-09 DIAGNOSIS — Z86.69 PERSONAL HISTORY OF OTHER DISEASES OF THE NERVOUS SYSTEM AND SENSE ORGANS: Chronic | ICD-10-CM

## 2021-04-09 DIAGNOSIS — E11.621 TYPE 2 DIABETES MELLITUS WITH FOOT ULCER: ICD-10-CM

## 2021-04-09 DIAGNOSIS — M75.00 ADHESIVE CAPSULITIS OF UNSPECIFIED SHOULDER: Chronic | ICD-10-CM

## 2021-04-09 DIAGNOSIS — S02.91XA UNSPECIFIED FRACTURE OF SKULL, INITIAL ENCOUNTER FOR CLOSED FRACTURE: Chronic | ICD-10-CM

## 2021-04-09 PROCEDURE — 15275 SKIN SUB GRAFT FACE/NK/HF/G: CPT

## 2021-04-09 PROCEDURE — 15275 SKIN SUB GRAFT FACE/NK/HF/G: CPT | Mod: 78

## 2021-04-09 NOTE — PROCEDURE
[Outpatient] : Outpatient [Ambulatory] : Patient is ambulatory. [Wheelchair] : wheelchair [THIS CHAMBER HAS BEEN CLEANED / DISINFECTED] : This chamber has been cleaned / disinfected according to local and hospital policy and procedure prior to this treatment. [____] : Post-Dive: Time - [unfilled] [___] : Post-Dive: Value - [unfilled] mg/dL [Patient demonstrated and verbalized proper technique for using air break mask] : Patient demonstrated and verbalized proper technique for using air break mask [Patient educated on the risks of SMOKING prior to HBOT with understanding] : Patient educated on the risks of SMOKING prior to HBOT with understanding [Patient educated on the risks of CONSUMING ALCOHOL prior to HBOT with understanding] : Patient educated on the risks of CONSUMING ALCOHOL prior to HBOT with understanding [100% Cotton] : 100% cotton [Empty all pockets] : empty all pockets [No hair oils, wigs, hairpieces, pins] : no hair oils, wigs, hairpieces, pins  [Pre tx medications] : pre tx medications  [No make-up, creams] : no make-up, creams  [No jewelry] : no jewelry  [No matches, cigarettes, lighters] : no matches, cigarettes, lighters  [Hearing aid removed] : hearing aid removed [Dentures removed] : dentures removed [Ground bracelet on pt's wrist] : ground bracelet on pt's wrist  [Contacts removed] : contacts removed  [Remove nail polish] : remove nail polish  [No reading material] : no reading material  [Bra, undergarments removed] : bra, undergarments removed  [No contraindicated dressings] : no contraindicated dressings [Ground Wire - VISUAL Verification - Intact/Free of Obstruction] : Ground Wire - VISUAL Verification - Intact/Free of Obstruction  [Ground Continuity - Verified < 1 ohm w/ Wrist Strap Channing] : Ground Continuity - Verified < 1 ohm w/ Wrist Strap Channing [Diagnosis: ___] : Diagnosis: [unfilled] [Number: ___] : Number: [unfilled] [Clear all fields] : clear all fields [] : No [FreeTextEntry3] : 90 [FreeTextEntry5] : 1000 [FreeTextEntry7] : 1011 [FreeTextEntry9] : 3213 [de-identified] : 5058 [de-identified] : 108 minutes

## 2021-04-09 NOTE — REVIEW OF SYSTEMS
[FreeTextEntry1] : 8200 [Fever] : no fever [Eye Pain] : no eye pain [Earache] : no earache [Chest Pain] : no chest pain [Shortness Of Breath] : no shortness of breath [Cough] : no cough [Abdominal Pain] : no abdominal pain [Skin Wound] : skin wound [Joint Stiffness] : joint stiffness [Anxiety] : no anxiety [Easy Bleeding] : no tendency for easy bleeding [FreeTextEntry5] : HTN [FreeTextEntry2] : morbid obesity [de-identified] : right foot plantar diabetic ulcer down to skin, subcutaneous tissue, fat, tendon, bone [de-identified] : diabetic neuropathy [de-identified] : KENNY

## 2021-04-09 NOTE — ASSESSMENT
[Verbal] : Verbal [Demo] : Demo [Patient] : Patient [Good - alert, interested, motivated] : Good - alert, interested, motivated [Verbalizes knowledge/Understanding] : Verbalizes knowledge/understanding [Dressing changes] : dressing changes [Foot Care] : foot care [Skin Care] : skin care [Pressure relief] : pressure relief [Signs and symptoms of infection] : sign and symptoms of infection [Nutrition] : nutrition [How and When to Call] : how and when to call [Pain Management] : pain management [Off-loading] : off-loading [Compression Therapy] : compression therapy [Patient responsibility to plan of care] : patient responsibility to plan of care [Glycemic Control] : glycemic control [] : Yes [Stable] : stable [Home] : Home [Wheelchair] : Wheelchair [Not Applicable - Long Term Care/Home Health Agency] : Long Term Care/Home Health Agency: Not Applicable [FreeTextEntry2] : Restore Skin Integrity\par Infection Control\par Offloading\par Compression Therapy\par Localized wound care\par Maintain acceptable pain levels at satisfactory relief.\par Demonstrates use of both nonpharmacological and pharmacological pain relief strategies\par Weight reduction Strategies\par Encourage Glycemic Control\par Pressure Relief [FreeTextEntry4] : F/U 1 week for assessment\par Preauthorization for sharps debridement and apligraf #9 submitted for next assessment\par HBOT completed;No additional Tx ordered

## 2021-04-09 NOTE — PROCEDURE
[Scalpel] : scalpel [Sharp curette] : sharp curette [Other: ___] : [unfilled] [Fenestrated] : fenestrated [Hydrated with saline] : hydrated with saline [Apligraf] : apligraf [____ % was used] : and [unfilled] % was used [____ % was discarded] : and [unfilled] % was discarded [FreeTextEntry1] : adaptic, calcium alginate, dry dressing, ACE [] : Yes [FreeTextEntry9] : 2268 [de-identified] : DFU 3 plantar right 1st metatarsal  [de-identified] : Nas [de-identified] : Alexandra [FreeTextEntry6] : DFU 3 right  [FreeTextEntry7] : same [de-identified] : 0 [de-identified] : 2cc [de-identified] : kiet

## 2021-04-09 NOTE — PHYSICAL EXAM
[4 x 4] : 4 x 4  [Abdominal Pad] : Abdominal Pad [2+] : left 2+ [Ankle Swelling (On Exam)] : not present [Varicose Veins Of Lower Extremities] : not present [] : not present [Skin Ulcer] : ulcer [Alert] : alert [Oriented to Person] : oriented to person [Oriented to Place] : oriented to place [Oriented to Time] : oriented to time [Calm] : calm [de-identified] : A&Ox3, NAD [de-identified] : HTN, diabetic small vessel disease  [de-identified] : right foot plantar diabetic ulcer down to skin, subcutaneous tissue, fat, tendon, bone, healing well [de-identified] : s/p right foot sesamoidectomy, 3/5 strength in all quadrants bilaterally [de-identified] : Diminished light touch sensation bilaterally [de-identified] : Circ neurovascular function WNL post Ace wraps, pt expressed comfort.\par Apligraf 44 sq cm\par 10% Implanted 90% Discarded\par [id number]\par Exp: 04/20/2021\par Lot: [id number]\par pH 7.3-7.5 [FreeTextEntry1] : Plantar Foot 1 st met [FreeTextEntry2] : 1.8 [FreeTextEntry3] : 0.8 [FreeTextEntry4] : 0.1-0.3 [de-identified] : serosanguineous [de-identified] : 1.5cm @ 11 o'clock [de-identified] : 0.2-0.3 cm @ 8-9 o'clock [de-identified] : callus [de-identified] : 90-95% [de-identified] : 5-10% [FreeTextEntry5] : Post Debridement measurements- 1.9 x 0.9 x 0.2-0.4 [de-identified] : Apligraf 44 sq cm- 10% Implanted 90% Discarded [de-identified] :  to hold dressing in place [de-identified] : Adaptic Touch and Calcium Alginate [TWNoteComboBox1] : Right [de-identified] : Cleansed with Normal saline\par Reconstituted in NS\par Lot: [id number]\par Exp: 05/01/2023\par \par  [TWNoteComboBox4] : Small [TWNoteComboBox5] : Yes [TWNoteComboBox6] : Surgical [de-identified] : Yes [de-identified] : other [de-identified] : None [de-identified] : None [de-identified] : >75% [de-identified] : Yes [de-identified] : False [TWNoteComboBox7] : Arlin [de-identified] : Application of skin substitute [de-identified] : Ace wraps [de-identified] : Weekly [de-identified] : Primary Dressing

## 2021-04-09 NOTE — ADDENDUM
[FreeTextEntry1] : PT'S OUTER DRESSING CHANGE PRIOR TO DESCENT BY RN.\par PT DESCENDED TO 2.0 EULALIO @ 1.75 PSI/MIN WITHOUT INCIDENT IN CHAMBER # 3. PT'S RESTING AT TX DEPTH WITH LEGS ELEVATED. CHEST RISE AND FALL OBSERVED CHAMBER SIDE.\par PT ASCENDED FROM TX DEPTH WITHOUT INCIDENT \par PT TOLERATED TX WELL

## 2021-04-09 NOTE — ASSESSMENT
[No change from previous assessment] : No change from previous assessment [Patient descended without problem for 9 minutes] : Patient descended without problem for 9 minutes [No dizziness or thirst] :  No dizziness or thirst [No ear problems] : No ear problems [Vital signs stable] : Vital signs stable [Tolerating dive well] : Tolerating dive well [No Chest Pain, shortness of breath] : No Chest Pain, shortness of breath [Respiratory Rate Stable] : Respiratory Rate Stable [No chest pain, shortness of breath, or ear pain] :  No chest pain, shortness of breath, or ear pain  [Tolerated Ascent well] : Tolerated Ascent well [Vital Signs stable] : Vital Signs stable [A physician was present throughout the entire HBOT] : A physician was present throughout the entire HBOT [No] : No [Clinically Stable] : Clinically stable [Continue Treatment Plan] : Continue treatment plan [0] : 0 out of 10 [Patient prepared for dive] : Patient prepared for dive [Patient undergoing HBO treatment for __________] : Patient undergoing HBO treatment for [unfilled] [FreeTextEntry2] : none

## 2021-04-10 DIAGNOSIS — Z88.0 ALLERGY STATUS TO PENICILLIN: ICD-10-CM

## 2021-04-10 DIAGNOSIS — Z98.890 OTHER SPECIFIED POSTPROCEDURAL STATES: ICD-10-CM

## 2021-04-10 DIAGNOSIS — E11.621 TYPE 2 DIABETES MELLITUS WITH FOOT ULCER: ICD-10-CM

## 2021-04-10 DIAGNOSIS — Z82.5 FAMILY HISTORY OF ASTHMA AND OTHER CHRONIC LOWER RESPIRATORY DISEASES: ICD-10-CM

## 2021-04-10 DIAGNOSIS — E66.01 MORBID (SEVERE) OBESITY DUE TO EXCESS CALORIES: ICD-10-CM

## 2021-04-10 DIAGNOSIS — Z83.3 FAMILY HISTORY OF DIABETES MELLITUS: ICD-10-CM

## 2021-04-10 DIAGNOSIS — L97.416 NON-PRESSURE CHRONIC ULCER OF RIGHT HEEL AND MIDFOOT WITH BONE INVOLVEMENT WITHOUT EVIDENCE OF NECROSIS: ICD-10-CM

## 2021-04-10 DIAGNOSIS — Z79.4 LONG TERM (CURRENT) USE OF INSULIN: ICD-10-CM

## 2021-04-10 DIAGNOSIS — E11.40 TYPE 2 DIABETES MELLITUS WITH DIABETIC NEUROPATHY, UNSPECIFIED: ICD-10-CM

## 2021-04-10 DIAGNOSIS — Z79.82 LONG TERM (CURRENT) USE OF ASPIRIN: ICD-10-CM

## 2021-04-10 DIAGNOSIS — Z79.899 OTHER LONG TERM (CURRENT) DRUG THERAPY: ICD-10-CM

## 2021-04-10 DIAGNOSIS — Z98.49 CATARACT EXTRACTION STATUS, UNSPECIFIED EYE: ICD-10-CM

## 2021-04-12 ENCOUNTER — APPOINTMENT (OUTPATIENT)
Dept: HYPERBARIC MEDICINE | Facility: HOSPITAL | Age: 71
End: 2021-04-12

## 2021-04-13 ENCOUNTER — APPOINTMENT (OUTPATIENT)
Dept: HYPERBARIC MEDICINE | Facility: HOSPITAL | Age: 71
End: 2021-04-13

## 2021-04-13 NOTE — ADDENDUM
[FreeTextEntry1] : The pt. presented to [facility] ambulating with cane and A&Ox3 for scheduled HBOT.\par Pt. was provided wheelchair upon arrival to [facility].\par The pt's pre-dive screening was found to be within acceptable limits to begin HBOT.\par The pt. was provided lower extremity elevation measure prior to HBOT.\par The pt. descended @ 1.75 PSI/min. to Rx'd tx. depth of 2.0 EULALIO in chamber # 1 without incident.\par The pt. was observed with visible chest motion and without incident for the duration of HBOT. \par Pt ascended from tx depth without incident. Pt tolerated tx well.\par Pt made aware of assessment tomorrow (4/2/21) with appropriate arrival time.

## 2021-04-13 NOTE — PROCEDURE
[Outpatient] : Outpatient [Ambulatory] : Patient is ambulatory. [Cane] : cane [Wheelchair] : wheelchair [THIS CHAMBER HAS BEEN CLEANED / DISINFECTED] : This chamber has been cleaned / disinfected according to local and hospital policy and procedure prior to this treatment. [Patient demonstrated and verbalized proper technique for using air break mask] : Patient demonstrated and verbalized proper technique for using air break mask [Patient educated on the risks of SMOKING prior to HBOT with understanding] : Patient educated on the risks of SMOKING prior to HBOT with understanding [Patient educated on the risks of CONSUMING ALCOHOL prior to HBOT with understanding] : Patient educated on the risks of CONSUMING ALCOHOL prior to HBOT with understanding [100% Cotton] : 100% cotton [Empty all pockets] : empty all pockets [No hair oils, wigs, hairpieces, pins] : no hair oils, wigs, hairpieces, pins  [Pre tx medications] : pre tx medications  [No make-up, creams] : no make-up, creams  [No jewelry] : no jewelry  [No matches, cigarettes, lighters] : no matches, cigarettes, lighters  [Hearing aid removed] : hearing aid removed [Dentures removed] : dentures removed [Ground bracelet on pt's wrist] : ground bracelet on pt's wrist  [Contacts removed] : contacts removed  [Remove nail polish] : remove nail polish  [No reading material] : no reading material  [Bra, undergarments removed] : bra, undergarments removed  [No contraindicated dressings] : no contraindicated dressings [Ground Wire - VISUAL Verification - Intact/Free of Obstruction] : Ground Wire - VISUAL Verification - Intact/Free of Obstruction  [Ground Continuity - Verified < 1 ohm w/ Wrist Strap Channing] : Ground Continuity - Verified < 1 ohm w/ Wrist Strap Channing [Number: ___] : Number: [unfilled] [Diagnosis: ___] : Diagnosis: [unfilled] [____] : Post-Dive: Time - [unfilled] [___] : Post-Dive: Value - [unfilled] mg/dL [Clear all fields] : clear all fields [] : No [FreeTextEntry3] : 90 [FreeTextEntry5] : 11 : 46 [FreeTextEntry7] : 11 : 55 [FreeTextEntry9] : 13 : 25 [de-identified] : 13 : 34 [de-identified] : 108 MIN

## 2021-04-14 ENCOUNTER — APPOINTMENT (OUTPATIENT)
Dept: HYPERBARIC MEDICINE | Facility: HOSPITAL | Age: 71
End: 2021-04-14

## 2021-04-15 ENCOUNTER — APPOINTMENT (OUTPATIENT)
Dept: HYPERBARIC MEDICINE | Facility: HOSPITAL | Age: 71
End: 2021-04-15

## 2021-04-15 NOTE — PROCEDURE
[Outpatient] : Outpatient [Wheelchair] : wheelchair [THIS CHAMBER HAS BEEN CLEANED / DISINFECTED] : This chamber has been cleaned / disinfected according to local and hospital policy and procedure prior to this treatment. [Patient demonstrated and verbalized proper technique for using air break mask] : Patient demonstrated and verbalized proper technique for using air break mask [Patient educated on the risks of SMOKING prior to HBOT with understanding] : Patient educated on the risks of SMOKING prior to HBOT with understanding [Patient educated on the risks of CONSUMING ALCOHOL prior to HBOT with understanding] : Patient educated on the risks of CONSUMING ALCOHOL prior to HBOT with understanding [100% Cotton] : 100% cotton [Empty all pockets] : empty all pockets [No hair oils, wigs, hairpieces, pins] : no hair oils, wigs, hairpieces, pins  [Pre tx medications] : pre tx medications  [No make-up, creams] : no make-up, creams  [No jewelry] : no jewelry  [No matches, cigarettes, lighters] : no matches, cigarettes, lighters  [Hearing aid removed] : hearing aid removed [Dentures removed] : dentures removed [Ground bracelet on pt's wrist] : ground bracelet on pt's wrist  [Contacts removed] : contacts removed  [Remove nail polish] : remove nail polish  [No reading material] : no reading material  [Bra, undergarments removed] : bra, undergarments removed  [No contraindicated dressings] : no contraindicated dressings [Ground Wire - VISUAL Verification - Intact/Free of Obstruction] : Ground Wire - VISUAL Verification - Intact/Free of Obstruction  [Ground Continuity - Verified < 1 ohm w/ Wrist Strap Channing] : Ground Continuity - Verified < 1 ohm w/ Wrist Strap Channing [Number: ___] : Number: [unfilled] [Diagnosis: ___] : Diagnosis: [unfilled] [____] : Post-Dive: Time - [unfilled] [___] : Post-Dive: Value - [unfilled] mg/dL [Clear all fields] : clear all fields [] : No [FreeTextEntry3] : 90 [FreeTextEntry5] : 0135 [FreeTextEntry7] : 1200 [FreeTextEntry9] : 9072 [de-identified] : 9958 [de-identified] : 108 miinutes

## 2021-04-15 NOTE — PROCEDURE
[Outpatient] : Outpatient [Ambulatory] : Patient is ambulatory. [Wheelchair] : wheelchair [THIS CHAMBER HAS BEEN CLEANED / DISINFECTED] : This chamber has been cleaned / disinfected according to local and hospital policy and procedure prior to this treatment. [Patient demonstrated and verbalized proper technique for using air break mask] : Patient demonstrated and verbalized proper technique for using air break mask [Patient educated on the risks of SMOKING prior to HBOT with understanding] : Patient educated on the risks of SMOKING prior to HBOT with understanding [Patient educated on the risks of CONSUMING ALCOHOL prior to HBOT with understanding] : Patient educated on the risks of CONSUMING ALCOHOL prior to HBOT with understanding [100% Cotton] : 100% cotton [Empty all pockets] : empty all pockets [No hair oils, wigs, hairpieces, pins] : no hair oils, wigs, hairpieces, pins  [Pre tx medications] : pre tx medications  [No make-up, creams] : no make-up, creams  [No jewelry] : no jewelry  [No matches, cigarettes, lighters] : no matches, cigarettes, lighters  [Hearing aid removed] : hearing aid removed [Dentures removed] : dentures removed [Ground bracelet on pt's wrist] : ground bracelet on pt's wrist  [Contacts removed] : contacts removed  [Remove nail polish] : remove nail polish  [No reading material] : no reading material  [Bra, undergarments removed] : bra, undergarments removed  [No contraindicated dressings] : no contraindicated dressings [Ground Wire - VISUAL Verification - Intact/Free of Obstruction] : Ground Wire - VISUAL Verification - Intact/Free of Obstruction  [Ground Continuity - Verified < 1 ohm w/ Wrist Strap Channing] : Ground Continuity - Verified < 1 ohm w/ Wrist Strap Channing [Clear all fields] : clear all fields [Number: ___] : Number: [unfilled] [Diagnosis: ___] : Diagnosis: [unfilled] [____] : Post-Dive: Time - [unfilled] [___] : Post-Dive: Value - [unfilled] mg/dL [] : No [FreeTextEntry3] : 90 [FreeTextEntry5] : 3916 [FreeTextEntry7] : 1907 [de-identified] : 5827 [FreeTextEntry9] : 5654 [de-identified] : 108 mins

## 2021-04-15 NOTE — ADDENDUM
[FreeTextEntry1] : PT DESCENDED TO 2.0 EULALIO @ 1.75 PSI/MIN WITHOUT INCIDENT IN CHAMBER #2. PT'S RESTING WITH HEAD AND LEGS ELEVATED WITH VISIBLE CHEST RISE AND FALL OBSERVED CHAMBERSIDE. \par PT ASCENDED FROM TX DEPTH TO SURFACE WITHOUT INCIDENT.\par PT TOLERATED HBOT WITHOUT INCIDENT.

## 2021-04-15 NOTE — ADDENDUM
[FreeTextEntry1] : PT RECEIVED DRESSING CHANGE PRIOR TO DESCENT. \par PT DESCENDED TO 2.0 EULALIO @ 1.75 PSI/MIN WITHOUT INCIDENT IN CHAMBER # 1. PT'S RESTING AT TX DEPTH WITH LEGS ELEVATED. CHEST RISE AND FALL OBSERVED CHAMBER SIDE. \par PT ASCENDED FROM TX DEPTH WITHOUT INCIDENT. PT TOLERATED TX WELL.\par \par \par ** PT MADE AWARE OF ASSESSMENT TOMORROW (3/26/21) WITH APPROPRIATE ARRIVAL TIME **

## 2021-04-15 NOTE — PROCEDURE
[Outpatient] : Outpatient [Ambulatory] : Patient is ambulatory. [Wheelchair] : wheelchair [THIS CHAMBER HAS BEEN CLEANED / DISINFECTED] : This chamber has been cleaned / disinfected according to local and hospital policy and procedure prior to this treatment. [Patient demonstrated and verbalized proper technique for using air break mask] : Patient demonstrated and verbalized proper technique for using air break mask [Patient educated on the risks of SMOKING prior to HBOT with understanding] : Patient educated on the risks of SMOKING prior to HBOT with understanding [Patient educated on the risks of CONSUMING ALCOHOL prior to HBOT with understanding] : Patient educated on the risks of CONSUMING ALCOHOL prior to HBOT with understanding [100% Cotton] : 100% cotton [Empty all pockets] : empty all pockets [No hair oils, wigs, hairpieces, pins] : no hair oils, wigs, hairpieces, pins  [Pre tx medications] : pre tx medications  [No make-up, creams] : no make-up, creams  [No jewelry] : no jewelry  [No matches, cigarettes, lighters] : no matches, cigarettes, lighters  [Hearing aid removed] : hearing aid removed [Dentures removed] : dentures removed [Ground bracelet on pt's wrist] : ground bracelet on pt's wrist  [Contacts removed] : contacts removed  [Remove nail polish] : remove nail polish  [No reading material] : no reading material  [Bra, undergarments removed] : bra, undergarments removed  [No contraindicated dressings] : no contraindicated dressings [Ground Wire - VISUAL Verification - Intact/Free of Obstruction] : Ground Wire - VISUAL Verification - Intact/Free of Obstruction  [Ground Continuity - Verified < 1 ohm w/ Wrist Strap Channing] : Ground Continuity - Verified < 1 ohm w/ Wrist Strap Channing [Number: ___] : Number: [unfilled] [Diagnosis: ___] : Diagnosis: [unfilled] [____] : Post-Dive: Time - [unfilled] [___] : Post-Dive: Value - [unfilled] mg/dL [Clear all fields] : clear all fields [] : No [FreeTextEntry3] : 90 [FreeTextEntry5] : 9572 [FreeTextEntry7] : 5311 [FreeTextEntry9] : 8153 [de-identified] : 108 minutes [de-identified] : 1976

## 2021-04-15 NOTE — PROCEDURE
[Outpatient] : Outpatient [Ambulatory] : Patient is ambulatory. [THIS CHAMBER HAS BEEN CLEANED / DISINFECTED] : This chamber has been cleaned / disinfected according to local and hospital policy and procedure prior to this treatment. [Patient demonstrated and verbalized proper technique for using air break mask] : Patient demonstrated and verbalized proper technique for using air break mask [Patient educated on the risks of SMOKING prior to HBOT with understanding] : Patient educated on the risks of SMOKING prior to HBOT with understanding [Patient educated on the risks of CONSUMING ALCOHOL prior to HBOT with understanding] : Patient educated on the risks of CONSUMING ALCOHOL prior to HBOT with understanding [100% Cotton] : 100% cotton [Empty all pockets] : empty all pockets [No hair oils, wigs, hairpieces, pins] : no hair oils, wigs, hairpieces, pins  [Pre tx medications] : pre tx medications  [No make-up, creams] : no make-up, creams  [No jewelry] : no jewelry  [No matches, cigarettes, lighters] : no matches, cigarettes, lighters  [Hearing aid removed] : hearing aid removed [Dentures removed] : dentures removed [Ground bracelet on pt's wrist] : ground bracelet on pt's wrist  [Contacts removed] : contacts removed  [Remove nail polish] : remove nail polish  [No reading material] : no reading material  [Bra, undergarments removed] : bra, undergarments removed  [No contraindicated dressings] : no contraindicated dressings [Ground Wire - VISUAL Verification - Intact/Free of Obstruction] : Ground Wire - VISUAL Verification - Intact/Free of Obstruction  [Ground Continuity - Verified < 1 ohm w/ Wrist Strap Channing] : Ground Continuity - Verified < 1 ohm w/ Wrist Strap Channing [Clear all fields] : clear all fields [Number: ___] : Number: [unfilled] [Diagnosis: ___] : Diagnosis: [unfilled] [____] : Post-Dive: Time - [unfilled] [___] : Post-Dive: Value - [unfilled] mg/dL [] : No [FreeTextEntry3] : 90 [FreeTextEntry5] : 1039 [FreeTextEntry7] : 6639 [FreeTextEntry9] : 8473 [de-identified] : 8566 [de-identified] : 108 mins

## 2021-04-15 NOTE — ADDENDUM
[FreeTextEntry1] : DRAINAGE NOTED ON PT'S DRESSING PRIOR TO DESCENT. RN NOTIFIED. PT RECEIVED DRESSING CHANGE PRIOR TO DIVE.\par PT DESCENDED TO 2.0 EULALIO @ 1.75 PSI/MIN WITHOUT INCIDENT IN CHAMBER # 2. PT'S RESTING AT TX DEPTH WITH LEGS ELEVATED. CHEST RISE AND FALL OBSERVED CHAMBER SIDE.\par PT ASCENDED FROM TX DEPTH WITHOUT INCIDENT \par PT TOLERATED TX WELL

## 2021-04-15 NOTE — ADDENDUM
[FreeTextEntry1] : Pt descended to 2.0 EULALIO @ 1.75 PSI/MIN without incident in chamber #1. \par Pt's resting at tx depth with visible chest rise and fall observed chamberside. \par Pt ascended from tx depth to surface without incident. \par Pt tolerated HBOT without incident. \par

## 2021-04-15 NOTE — PROCEDURE
[Outpatient] : Outpatient [Wheelchair] : wheelchair [THIS CHAMBER HAS BEEN CLEANED / DISINFECTED] : This chamber has been cleaned / disinfected according to local and hospital policy and procedure prior to this treatment. [Patient demonstrated and verbalized proper technique for using air break mask] : Patient demonstrated and verbalized proper technique for using air break mask [Patient educated on the risks of SMOKING prior to HBOT with understanding] : Patient educated on the risks of SMOKING prior to HBOT with understanding [Patient educated on the risks of CONSUMING ALCOHOL prior to HBOT with understanding] : Patient educated on the risks of CONSUMING ALCOHOL prior to HBOT with understanding [100% Cotton] : 100% cotton [Empty all pockets] : empty all pockets [No hair oils, wigs, hairpieces, pins] : no hair oils, wigs, hairpieces, pins  [Pre tx medications] : pre tx medications  [No make-up, creams] : no make-up, creams  [No jewelry] : no jewelry  [No matches, cigarettes, lighters] : no matches, cigarettes, lighters  [Hearing aid removed] : hearing aid removed [Dentures removed] : dentures removed [Ground bracelet on pt's wrist] : ground bracelet on pt's wrist  [Contacts removed] : contacts removed  [Remove nail polish] : remove nail polish  [No reading material] : no reading material  [Bra, undergarments removed] : bra, undergarments removed  [No contraindicated dressings] : no contraindicated dressings [Ground Wire - VISUAL Verification - Intact/Free of Obstruction] : Ground Wire - VISUAL Verification - Intact/Free of Obstruction  [Ground Continuity - Verified < 1 ohm w/ Wrist Strap Channing] : Ground Continuity - Verified < 1 ohm w/ Wrist Strap Channing [Number: ___] : Number: [unfilled] [Diagnosis: ___] : Diagnosis: [unfilled] [____] : Post-Dive: Time - [unfilled] [___] : Post-Dive: Value - [unfilled] mg/dL [Clear all fields] : clear all fields [] : No [FreeTextEntry3] : 90 [FreeTextEntry5] : 1209 [FreeTextEntry7] : 121 [FreeTextEntry9] : 1936 [de-identified] : 6380 [de-identified] : 108 MIN

## 2021-04-15 NOTE — ADDENDUM
[FreeTextEntry1] : Pt descended to 2.0 EULALIO @ 1.75 PSI/MIN without incident in chamber #1.\par Pt's resting at tx depth with visible chest rise and fall observed chamberside.\par Pt ascended from tx depth to surface without incident.\par Pt tolerated HBOT without incident.

## 2021-04-15 NOTE — PROCEDURE

## 2021-04-15 NOTE — PROCEDURE
[Outpatient] : Outpatient [Ambulatory] : Patient is ambulatory. [Walker] : walker [THIS CHAMBER HAS BEEN CLEANED / DISINFECTED] : This chamber has been cleaned / disinfected according to local and hospital policy and procedure prior to this treatment. [Patient demonstrated and verbalized proper technique for using air break mask] : Patient demonstrated and verbalized proper technique for using air break mask [Patient educated on the risks of SMOKING prior to HBOT with understanding] : Patient educated on the risks of SMOKING prior to HBOT with understanding [Patient educated on the risks of CONSUMING ALCOHOL prior to HBOT with understanding] : Patient educated on the risks of CONSUMING ALCOHOL prior to HBOT with understanding [100% Cotton] : 100% cotton [Empty all pockets] : empty all pockets [No hair oils, wigs, hairpieces, pins] : no hair oils, wigs, hairpieces, pins  [Pre tx medications] : pre tx medications  [No make-up, creams] : no make-up, creams  [No jewelry] : no jewelry  [No matches, cigarettes, lighters] : no matches, cigarettes, lighters  [Hearing aid removed] : hearing aid removed [Dentures removed] : dentures removed [Ground bracelet on pt's wrist] : ground bracelet on pt's wrist  [Contacts removed] : contacts removed  [Remove nail polish] : remove nail polish  [No reading material] : no reading material  [Bra, undergarments removed] : bra, undergarments removed  [No contraindicated dressings] : no contraindicated dressings [Ground Wire - VISUAL Verification - Intact/Free of Obstruction] : Ground Wire - VISUAL Verification - Intact/Free of Obstruction  [Ground Continuity - Verified < 1 ohm w/ Wrist Strap Channing] : Ground Continuity - Verified < 1 ohm w/ Wrist Strap Channing [Clear all fields] : clear all fields [Number: ___] : Number: [unfilled] [Diagnosis: ___] : Diagnosis: [unfilled] [____] : Post-Dive: Time - [unfilled] [___] : Post-Dive: Value - [unfilled] mg/dL [] : No [FreeTextEntry3] : 90 [FreeTextEntry5] : 4361 [FreeTextEntry7] : 2674 [FreeTextEntry9] : 3585 [de-identified] : 6104 [de-identified] : 108 mins

## 2021-04-16 ENCOUNTER — APPOINTMENT (OUTPATIENT)
Dept: HYPERBARIC MEDICINE | Facility: HOSPITAL | Age: 71
End: 2021-04-16

## 2021-04-16 ENCOUNTER — APPOINTMENT (OUTPATIENT)
Dept: WOUND CARE | Facility: HOSPITAL | Age: 71
End: 2021-04-16

## 2021-04-16 ENCOUNTER — APPOINTMENT (OUTPATIENT)
Dept: PODIATRY | Facility: HOSPITAL | Age: 71
End: 2021-04-16
Payer: MEDICARE

## 2021-04-16 ENCOUNTER — OUTPATIENT (OUTPATIENT)
Dept: OUTPATIENT SERVICES | Facility: HOSPITAL | Age: 71
LOS: 1 days | Discharge: HOME | End: 2021-04-16
Payer: MEDICARE

## 2021-04-16 VITALS
SYSTOLIC BLOOD PRESSURE: 170 MMHG | WEIGHT: 293 LBS | DIASTOLIC BLOOD PRESSURE: 66 MMHG | TEMPERATURE: 97.6 F | HEART RATE: 62 BPM | HEIGHT: 71 IN | RESPIRATION RATE: 20 BRPM | BODY MASS INDEX: 41.02 KG/M2 | OXYGEN SATURATION: 98 %

## 2021-04-16 DIAGNOSIS — S02.91XA UNSPECIFIED FRACTURE OF SKULL, INITIAL ENCOUNTER FOR CLOSED FRACTURE: Chronic | ICD-10-CM

## 2021-04-16 DIAGNOSIS — M75.00 ADHESIVE CAPSULITIS OF UNSPECIFIED SHOULDER: Chronic | ICD-10-CM

## 2021-04-16 DIAGNOSIS — Z86.69 PERSONAL HISTORY OF OTHER DISEASES OF THE NERVOUS SYSTEM AND SENSE ORGANS: Chronic | ICD-10-CM

## 2021-04-16 DIAGNOSIS — Z98.890 OTHER SPECIFIED POSTPROCEDURAL STATES: Chronic | ICD-10-CM

## 2021-04-16 DIAGNOSIS — E11.621 TYPE 2 DIABETES MELLITUS WITH FOOT ULCER: ICD-10-CM

## 2021-04-16 PROCEDURE — 15275 SKIN SUB GRAFT FACE/NK/HF/G: CPT

## 2021-04-16 NOTE — PHYSICAL EXAM
[2+] : left 2+ [Ankle Swelling (On Exam)] : not present [Varicose Veins Of Lower Extremities] : not present [] : not present [Skin Ulcer] : ulcer [Alert] : alert [Oriented to Person] : oriented to person [Oriented to Place] : oriented to place [Oriented to Time] : oriented to time [Calm] : calm [de-identified] : A&Ox3, NAD [de-identified] : HTN, diabetic small vessel disease  [de-identified] : s/p right foot sesamoidectomy, 3/5 strength in all quadrants bilaterally [de-identified] : right foot plantar diabetic ulcer down to skin, subcutaneous tissue, fat, healing well [de-identified] : Diminished light touch sensation bilaterally [de-identified] : All dressings applied by DPM \par Circ neurovascular function WNL post Ace wraps, pt expressed comfort.\par Small blood loss noted. Patient tolerated procedure well. [FreeTextEntry1] : Plantar Foot 1 st met [FreeTextEntry2] : 1.5 [FreeTextEntry3] : 0.7 [FreeTextEntry4] : 0.1-0.3 [de-identified] : serosanguineous [de-identified] : 1.5cm @ 11 o'clock [de-identified] : 0.3 cm @ 8-9 o'clock [de-identified] : callus [de-identified] : 90% [de-identified] : 10% [de-identified] : Apligraf  [de-identified] :  to hold dressing in place [de-identified] : Adaptic Touch and Calcium Alginate [de-identified] : Cleansed with Normal saline\par \par post debridement measurements: \par 1.6 x 0.8 x 0.3\par \par 9th application of 1 unit of Apligraf 44 sq cm\par 50 %Implanted,  50%Discarded\par [id number]\par Exp: 04/27/2021\par Lot: [id number]\par pH 7.3-7.5\par \par Reconstituted with 0.9% Normal saline\par LOT#: [id number]\par EXP: 05/01/2023\par \par  [TWNoteComboBox1] : Right [TWNoteComboBox4] : Small [TWNoteComboBox5] : Yes [TWNoteComboBox6] : Surgical [de-identified] : Yes [de-identified] : other [de-identified] : None [de-identified] : None [de-identified] : >75% [de-identified] : Yes [TWNoteComboBox7] : Arlin [de-identified] : Application of skin substitute [de-identified] : Ace wraps [de-identified] : Weekly [de-identified] : Primary Dressing

## 2021-04-16 NOTE — REVIEW OF SYSTEMS
[FreeTextEntry1] : 12:41PM [Fever] : no fever [Eye Pain] : no eye pain [Earache] : no earache [Chest Pain] : no chest pain [Shortness Of Breath] : no shortness of breath [Cough] : no cough [Abdominal Pain] : no abdominal pain [Joint Stiffness] : joint stiffness [Skin Wound] : skin wound [Anxiety] : no anxiety [Easy Bleeding] : no tendency for easy bleeding [FreeTextEntry2] : morbid obesity [FreeTextEntry5] : HTN [de-identified] : right foot plantar diabetic ulcer down to skin, subcutaneous tissue, fat,  [de-identified] : diabetic neuropathy [de-identified] : KENNY

## 2021-04-16 NOTE — VITALS
[Pain related to present condition?] : The patient's  pain is not related to present condition. [] : No [de-identified] : 0

## 2021-04-16 NOTE — ASSESSMENT
[] : No [FreeTextEntry2] : Restore Skin Integrity\par Infection Control\par Offloading\par Compression Therapy\par Localized wound care\par Maintain acceptable pain levels at satisfactory relief.\par Demonstrates use of both nonpharmacological and pharmacological pain relief strategies\par Weight reduction Strategies\par Encourage Glycemic Control\par Pressure Relief\par Sharps debridement\par Apligraf\par F/U 1 week  [FreeTextEntry4] : 9th application of Apligraf performed today.\par DPM ordered 10th application of Apligraf. Authorization submitted today\par F/U 1 week \par

## 2021-04-16 NOTE — PROCEDURE
[Saline] : saline [Scalpel] : scalpel [Other: ___] : [unfilled] [Fenestrated] : fenestrated [Hydrated with saline] : hydrated with saline [Apligraf] : apligraf [____ % was used] : and [unfilled] % was used [____ % was discarded] : and [unfilled] % was discarded [FreeTextEntry1] : alginate  [] : Yes [FreeTextEntry9] : 1:35PM [de-identified] : DFU 3 plantar right  [de-identified] : Juan Carlos [FreeTextEntry6] : DFU 3 right  [FreeTextEntry7] : same [de-identified] : 0 [de-identified] : 2cc [de-identified] : kiet

## 2021-04-17 DIAGNOSIS — E11.621 TYPE 2 DIABETES MELLITUS WITH FOOT ULCER: ICD-10-CM

## 2021-04-17 DIAGNOSIS — Z88.0 ALLERGY STATUS TO PENICILLIN: ICD-10-CM

## 2021-04-17 DIAGNOSIS — Z79.4 LONG TERM (CURRENT) USE OF INSULIN: ICD-10-CM

## 2021-04-17 DIAGNOSIS — E66.01 MORBID (SEVERE) OBESITY DUE TO EXCESS CALORIES: ICD-10-CM

## 2021-04-17 DIAGNOSIS — E11.40 TYPE 2 DIABETES MELLITUS WITH DIABETIC NEUROPATHY, UNSPECIFIED: ICD-10-CM

## 2021-04-17 DIAGNOSIS — L97.416 NON-PRESSURE CHRONIC ULCER OF RIGHT HEEL AND MIDFOOT WITH BONE INVOLVEMENT WITHOUT EVIDENCE OF NECROSIS: ICD-10-CM

## 2021-04-17 DIAGNOSIS — Z82.5 FAMILY HISTORY OF ASTHMA AND OTHER CHRONIC LOWER RESPIRATORY DISEASES: ICD-10-CM

## 2021-04-17 DIAGNOSIS — Z83.3 FAMILY HISTORY OF DIABETES MELLITUS: ICD-10-CM

## 2021-04-17 DIAGNOSIS — Z79.82 LONG TERM (CURRENT) USE OF ASPIRIN: ICD-10-CM

## 2021-04-17 DIAGNOSIS — Z79.899 OTHER LONG TERM (CURRENT) DRUG THERAPY: ICD-10-CM

## 2021-04-17 DIAGNOSIS — Z98.49 CATARACT EXTRACTION STATUS, UNSPECIFIED EYE: ICD-10-CM

## 2021-04-17 DIAGNOSIS — Z98.890 OTHER SPECIFIED POSTPROCEDURAL STATES: ICD-10-CM

## 2021-04-20 NOTE — PROCEDURE

## 2021-04-23 ENCOUNTER — OUTPATIENT (OUTPATIENT)
Dept: OUTPATIENT SERVICES | Facility: HOSPITAL | Age: 71
LOS: 1 days | Discharge: HOME | End: 2021-04-23
Payer: MEDICARE

## 2021-04-23 ENCOUNTER — APPOINTMENT (OUTPATIENT)
Dept: WOUND CARE | Facility: HOSPITAL | Age: 71
End: 2021-04-23
Payer: MEDICARE

## 2021-04-23 VITALS
SYSTOLIC BLOOD PRESSURE: 146 MMHG | RESPIRATION RATE: 20 BRPM | TEMPERATURE: 97.8 F | DIASTOLIC BLOOD PRESSURE: 74 MMHG | BODY MASS INDEX: 41.02 KG/M2 | WEIGHT: 293 LBS | OXYGEN SATURATION: 95 % | HEIGHT: 71 IN | HEART RATE: 80 BPM

## 2021-04-23 DIAGNOSIS — E11.621 TYPE 2 DIABETES MELLITUS WITH FOOT ULCER: ICD-10-CM

## 2021-04-23 DIAGNOSIS — Z98.890 OTHER SPECIFIED POSTPROCEDURAL STATES: Chronic | ICD-10-CM

## 2021-04-23 DIAGNOSIS — M75.00 ADHESIVE CAPSULITIS OF UNSPECIFIED SHOULDER: Chronic | ICD-10-CM

## 2021-04-23 DIAGNOSIS — Z86.69 PERSONAL HISTORY OF OTHER DISEASES OF THE NERVOUS SYSTEM AND SENSE ORGANS: Chronic | ICD-10-CM

## 2021-04-23 DIAGNOSIS — S02.91XA UNSPECIFIED FRACTURE OF SKULL, INITIAL ENCOUNTER FOR CLOSED FRACTURE: Chronic | ICD-10-CM

## 2021-04-23 PROCEDURE — 99213 OFFICE O/P EST LOW 20 MIN: CPT

## 2021-04-23 PROCEDURE — G0463: CPT

## 2021-04-23 NOTE — PLAN
[FreeTextEntry1] : Patient examined and evaluated at this time.\par Continue local wound care and offloading.\par Spent 20 minutes for patient care and medical decision making.\par Pt to follow up in 1 week.

## 2021-04-23 NOTE — HISTORY OF PRESENT ILLNESS
[FreeTextEntry1] : 70 year old female seen for right foot diabetic ulcer down to skin, subcutaneous tissue, fat, tendon, and bone, healing well.

## 2021-04-23 NOTE — REVIEW OF SYSTEMS
[Fever] : no fever [Eye Pain] : no eye pain [Earache] : no earache [Chest Pain] : no chest pain [Shortness Of Breath] : no shortness of breath [Cough] : no cough [Abdominal Pain] : no abdominal pain [Joint Stiffness] : joint stiffness [Skin Wound] : skin wound [Anxiety] : no anxiety [Easy Bleeding] : no tendency for easy bleeding [FreeTextEntry2] : morbid obesity [FreeTextEntry5] : HTN [de-identified] : right foot plantar diabetic ulcer down to skin, subcutaneous tissue, fat, bone, healing well [de-identified] : diabetic neuropathy [de-identified] : KENNY

## 2021-04-23 NOTE — ASSESSMENT
[Verbal] : Verbal [Demo] : Demo [Patient] : Patient [Good - alert, interested, motivated] : Good - alert, interested, motivated [Verbalizes knowledge/Understanding] : Verbalizes knowledge/understanding [Dressing changes] : dressing changes [Foot Care] : foot care [Skin Care] : skin care [Pressure relief] : pressure relief [Signs and symptoms of infection] : sign and symptoms of infection [Nutrition] : nutrition [How and When to Call] : how and when to call [Pain Management] : pain management [Off-loading] : off-loading [Patient responsibility to plan of care] : patient responsibility to plan of care [Glycemic Control] : glycemic control [Stable] : stable [Home] : Home [Wheelchair] : Wheelchair [Not Applicable - Long Term Care/Home Health Agency] : Long Term Care/Home Health Agency: Not Applicable [] : Yes [FreeTextEntry2] : Restore Skin Integrity\par Infection Control\par Offloading\par Collagen Matrix Therapy \par Localized wound care\par Demonstrates use of both nonpharmacological and pharmacological pain relief strategies\par Weight reduction Strategies\par Encourage Glycemic Control\par Pressure Relief\par  [FreeTextEntry4] : DPM stated 10th application of Apligraf is no longer needed at this time.\par Pt to F/U to [facility] in 1 Week

## 2021-04-23 NOTE — PHYSICAL EXAM
[4 x 4] : 4 x 4  [Abdominal Pad] : Abdominal Pad [2+] : left 2+ [Ankle Swelling (On Exam)] : not present [Varicose Veins Of Lower Extremities] : not present [] : not present [Skin Ulcer] : ulcer [Alert] : alert [Oriented to Person] : oriented to person [Oriented to Place] : oriented to place [Oriented to Time] : oriented to time [Calm] : calm [de-identified] : A&Ox3, NAD [de-identified] : HTN, diabetic small vessel disease  [de-identified] : s/p right foot sesamoidectomy, 3/5 strength in all quadrants bilaterally [de-identified] : right foot plantar diabetic ulcer down to skin, subcutaneous tissue, fat, bone, healing well [de-identified] : Diminished light touch sensation bilaterally [FreeTextEntry1] : Plantar Foot 1 st met [FreeTextEntry2] : 1.2 [FreeTextEntry3] : 0.3 [FreeTextEntry4] : 0.1-0.2 [de-identified] : serosanguineous [de-identified] : mild [de-identified] : 90% [de-identified] : 10% [de-identified] : Anni  [de-identified] : Cleansed with Normal saline\par \par  [TWNoteComboBox1] : Right [TWNoteComboBox4] : Small [TWNoteComboBox5] : No [TWNoteComboBox6] : Surgical [de-identified] : No [de-identified] : Macerated [de-identified] : None [de-identified] : None [de-identified] : Yes [TWNoteComboBox7] : Arlin [de-identified] : Application of skin substitute [de-identified] : Ace wraps [de-identified] : 3x Weekly [de-identified] : Primary Dressing

## 2021-04-24 DIAGNOSIS — Z98.49 CATARACT EXTRACTION STATUS, UNSPECIFIED EYE: ICD-10-CM

## 2021-04-24 DIAGNOSIS — Z83.3 FAMILY HISTORY OF DIABETES MELLITUS: ICD-10-CM

## 2021-04-24 DIAGNOSIS — E11.621 TYPE 2 DIABETES MELLITUS WITH FOOT ULCER: ICD-10-CM

## 2021-04-24 DIAGNOSIS — Z79.4 LONG TERM (CURRENT) USE OF INSULIN: ICD-10-CM

## 2021-04-24 DIAGNOSIS — Z82.5 FAMILY HISTORY OF ASTHMA AND OTHER CHRONIC LOWER RESPIRATORY DISEASES: ICD-10-CM

## 2021-04-24 DIAGNOSIS — Z88.0 ALLERGY STATUS TO PENICILLIN: ICD-10-CM

## 2021-04-24 DIAGNOSIS — L97.416 NON-PRESSURE CHRONIC ULCER OF RIGHT HEEL AND MIDFOOT WITH BONE INVOLVEMENT WITHOUT EVIDENCE OF NECROSIS: ICD-10-CM

## 2021-04-24 DIAGNOSIS — Z79.82 LONG TERM (CURRENT) USE OF ASPIRIN: ICD-10-CM

## 2021-04-24 DIAGNOSIS — Z79.899 OTHER LONG TERM (CURRENT) DRUG THERAPY: ICD-10-CM

## 2021-04-24 DIAGNOSIS — E11.40 TYPE 2 DIABETES MELLITUS WITH DIABETIC NEUROPATHY, UNSPECIFIED: ICD-10-CM

## 2021-04-24 DIAGNOSIS — Z98.890 OTHER SPECIFIED POSTPROCEDURAL STATES: ICD-10-CM

## 2021-04-30 ENCOUNTER — APPOINTMENT (OUTPATIENT)
Dept: PODIATRY | Facility: HOSPITAL | Age: 71
End: 2021-04-30
Payer: MEDICARE

## 2021-04-30 ENCOUNTER — OUTPATIENT (OUTPATIENT)
Dept: OUTPATIENT SERVICES | Facility: HOSPITAL | Age: 71
LOS: 1 days | Discharge: HOME | End: 2021-04-30
Payer: MEDICARE

## 2021-04-30 VITALS
SYSTOLIC BLOOD PRESSURE: 168 MMHG | HEART RATE: 72 BPM | TEMPERATURE: 97.8 F | RESPIRATION RATE: 20 BRPM | OXYGEN SATURATION: 97 % | WEIGHT: 293 LBS | HEIGHT: 71 IN | DIASTOLIC BLOOD PRESSURE: 69 MMHG | BODY MASS INDEX: 41.02 KG/M2

## 2021-04-30 DIAGNOSIS — E11.621 TYPE 2 DIABETES MELLITUS WITH FOOT ULCER: ICD-10-CM

## 2021-04-30 DIAGNOSIS — Z98.890 OTHER SPECIFIED POSTPROCEDURAL STATES: Chronic | ICD-10-CM

## 2021-04-30 DIAGNOSIS — Z86.69 PERSONAL HISTORY OF OTHER DISEASES OF THE NERVOUS SYSTEM AND SENSE ORGANS: Chronic | ICD-10-CM

## 2021-04-30 DIAGNOSIS — M75.00 ADHESIVE CAPSULITIS OF UNSPECIFIED SHOULDER: Chronic | ICD-10-CM

## 2021-04-30 DIAGNOSIS — S02.91XA UNSPECIFIED FRACTURE OF SKULL, INITIAL ENCOUNTER FOR CLOSED FRACTURE: Chronic | ICD-10-CM

## 2021-04-30 PROCEDURE — 99213 OFFICE O/P EST LOW 20 MIN: CPT

## 2021-04-30 PROCEDURE — G0463: CPT

## 2021-05-04 DIAGNOSIS — Z79.899 OTHER LONG TERM (CURRENT) DRUG THERAPY: ICD-10-CM

## 2021-05-04 DIAGNOSIS — Z98.890 OTHER SPECIFIED POSTPROCEDURAL STATES: ICD-10-CM

## 2021-05-04 DIAGNOSIS — Z88.0 ALLERGY STATUS TO PENICILLIN: ICD-10-CM

## 2021-05-04 DIAGNOSIS — Z79.4 LONG TERM (CURRENT) USE OF INSULIN: ICD-10-CM

## 2021-05-04 DIAGNOSIS — Z82.5 FAMILY HISTORY OF ASTHMA AND OTHER CHRONIC LOWER RESPIRATORY DISEASES: ICD-10-CM

## 2021-05-04 DIAGNOSIS — L97.416 NON-PRESSURE CHRONIC ULCER OF RIGHT HEEL AND MIDFOOT WITH BONE INVOLVEMENT WITHOUT EVIDENCE OF NECROSIS: ICD-10-CM

## 2021-05-04 DIAGNOSIS — E11.40 TYPE 2 DIABETES MELLITUS WITH DIABETIC NEUROPATHY, UNSPECIFIED: ICD-10-CM

## 2021-05-04 DIAGNOSIS — E66.01 MORBID (SEVERE) OBESITY DUE TO EXCESS CALORIES: ICD-10-CM

## 2021-05-04 DIAGNOSIS — Z98.49 CATARACT EXTRACTION STATUS, UNSPECIFIED EYE: ICD-10-CM

## 2021-05-04 DIAGNOSIS — E11.621 TYPE 2 DIABETES MELLITUS WITH FOOT ULCER: ICD-10-CM

## 2021-05-04 DIAGNOSIS — Z79.82 LONG TERM (CURRENT) USE OF ASPIRIN: ICD-10-CM

## 2021-05-04 DIAGNOSIS — Z83.3 FAMILY HISTORY OF DIABETES MELLITUS: ICD-10-CM

## 2021-05-04 NOTE — PLAN
[FreeTextEntry1] : continue off loading and wound care , patient happy with the progress Spent 20 minutes for patient care and medical decision making.\par

## 2021-05-04 NOTE — ASSESSMENT
[Verbal] : Verbal [Demo] : Demo [Patient] : Patient [Good - alert, interested, motivated] : Good - alert, interested, motivated [Verbalizes knowledge/Understanding] : Verbalizes knowledge/understanding [Dressing changes] : dressing changes [Foot Care] : foot care [Skin Care] : skin care [Pressure relief] : pressure relief [Signs and symptoms of infection] : sign and symptoms of infection [Nutrition] : nutrition [How and When to Call] : how and when to call [Pain Management] : pain management [Off-loading] : off-loading [Patient responsibility to plan of care] : patient responsibility to plan of care [Glycemic Control] : glycemic control [] : Yes [Stable] : stable [Home] : Home [Wheelchair] : Wheelchair [Not Applicable - Long Term Care/Home Health Agency] : Long Term Care/Home Health Agency: Not Applicable [FreeTextEntry2] : Restore Skin Integrity\par Infection Control\par Offloading\par Collagen Matrix Therapy \par Localized wound care\par Demonstrates use of both nonpharmacological and pharmacological pain relief strategies\par Weight reduction Strategies\par Encourage Glycemic Control\par Pressure Relief\par  [FreeTextEntry4] : Pt to F/U to [facility] in 1 Week

## 2021-05-04 NOTE — REVIEW OF SYSTEMS
[Fever] : no fever [Eye Pain] : no eye pain [Earache] : no earache [Chest Pain] : no chest pain [Shortness Of Breath] : no shortness of breath [Cough] : no cough [Abdominal Pain] : no abdominal pain [Joint Stiffness] : joint stiffness [Skin Wound] : skin wound [Anxiety] : no anxiety [Easy Bleeding] : no tendency for easy bleeding [FreeTextEntry2] : morbid obesity [FreeTextEntry5] : HTN [FreeTextEntry9] : morbid obesity  [de-identified] : right foot plantar diabetic ulcer down to skin, subcutaneous tissue, fat, bone, healing well [de-identified] : diabetic neuropathy [de-identified] : KENNY

## 2021-05-04 NOTE — PHYSICAL EXAM
[4 x 4] : 4 x 4  [Abdominal Pad] : Abdominal Pad [2+] : left 2+ [Ankle Swelling (On Exam)] : not present [Varicose Veins Of Lower Extremities] : not present [] : not present [Skin Ulcer] : ulcer [Alert] : alert [Oriented to Person] : oriented to person [Oriented to Place] : oriented to place [Oriented to Time] : oriented to time [Calm] : calm [de-identified] : A&Ox3, NAD [de-identified] : HTN, diabetic small vessel disease  [de-identified] : s/p right foot sesamoidectomy, 3/5 strength in all quadrants bilaterally [de-identified] : right foot plantar diabetic ulcer down to skin, subcutaneous tissue, fat, bone, healing well , clean , granular  [de-identified] : Diminished light touch sensation bilaterally [FreeTextEntry1] : Plantar Foot 1 st met [FreeTextEntry2] : 1.0 [FreeTextEntry3] : 0.3 [FreeTextEntry4] : 0.1-0.4 [de-identified] : serosanguineous [de-identified] : mild [de-identified] : 100% [de-identified] : Anni  [de-identified] : Cleansed with Normal saline\par \par  [TWNoteComboBox1] : Right [TWNoteComboBox4] : Small [TWNoteComboBox5] : No [TWNoteComboBox6] : Surgical [de-identified] : No [de-identified] : Macerated [de-identified] : None [de-identified] : None [de-identified] : No [de-identified] : 3x Weekly [de-identified] : Primary Dressing

## 2021-05-07 ENCOUNTER — APPOINTMENT (OUTPATIENT)
Dept: PODIATRY | Facility: HOSPITAL | Age: 71
End: 2021-05-07
Payer: MEDICARE

## 2021-05-07 ENCOUNTER — OUTPATIENT (OUTPATIENT)
Dept: OUTPATIENT SERVICES | Facility: HOSPITAL | Age: 71
LOS: 1 days | Discharge: HOME | End: 2021-05-07
Payer: MEDICARE

## 2021-05-07 VITALS
RESPIRATION RATE: 20 BRPM | OXYGEN SATURATION: 98 % | DIASTOLIC BLOOD PRESSURE: 73 MMHG | BODY MASS INDEX: 41.02 KG/M2 | TEMPERATURE: 97.6 F | HEART RATE: 67 BPM | WEIGHT: 293 LBS | SYSTOLIC BLOOD PRESSURE: 162 MMHG | HEIGHT: 71 IN

## 2021-05-07 VITALS — BODY MASS INDEX: 41.02 KG/M2 | HEIGHT: 71 IN | WEIGHT: 293 LBS

## 2021-05-07 DIAGNOSIS — Z98.890 OTHER SPECIFIED POSTPROCEDURAL STATES: Chronic | ICD-10-CM

## 2021-05-07 DIAGNOSIS — E11.621 TYPE 2 DIABETES MELLITUS WITH FOOT ULCER: ICD-10-CM

## 2021-05-07 DIAGNOSIS — Z86.69 PERSONAL HISTORY OF OTHER DISEASES OF THE NERVOUS SYSTEM AND SENSE ORGANS: Chronic | ICD-10-CM

## 2021-05-07 DIAGNOSIS — S02.91XA UNSPECIFIED FRACTURE OF SKULL, INITIAL ENCOUNTER FOR CLOSED FRACTURE: Chronic | ICD-10-CM

## 2021-05-07 DIAGNOSIS — M75.00 ADHESIVE CAPSULITIS OF UNSPECIFIED SHOULDER: Chronic | ICD-10-CM

## 2021-05-07 PROCEDURE — G0463: CPT

## 2021-05-07 PROCEDURE — 99213 OFFICE O/P EST LOW 20 MIN: CPT

## 2021-05-07 NOTE — ASSESSMENT
[Verbal] : Verbal [Demo] : Demo [Patient] : Patient [Good - alert, interested, motivated] : Good - alert, interested, motivated [Verbalizes knowledge/Understanding] : Verbalizes knowledge/understanding [Dressing changes] : dressing changes [Foot Care] : foot care [Skin Care] : skin care [Pressure relief] : pressure relief [Signs and symptoms of infection] : sign and symptoms of infection [Nutrition] : nutrition [How and When to Call] : how and when to call [Off-loading] : off-loading [Patient responsibility to plan of care] : patient responsibility to plan of care [Glycemic Control] : glycemic control [] : Yes [Stable] : stable [Home] : Home [Cane] : Cane [FreeTextEntry2] : Restore Skin Integrity\par Infection Control\par Offloading\par Collagen Matrix Therapy \par Localized wound care\par Demonstrates use of both nonpharmacological and pharmacological pain relief strategies\par Weight reduction Strategies\par Encourage Glycemic Control\par Pressure Relief\par  [FreeTextEntry4] : Pt to F/U to [facility] in 2 Weeks

## 2021-05-07 NOTE — PHYSICAL EXAM
[4 x 4] : 4 x 4  [2+] : left 2+ [Ankle Swelling (On Exam)] : not present [Varicose Veins Of Lower Extremities] : not present [] : not present [Skin Ulcer] : ulcer [Alert] : alert [Oriented to Person] : oriented to person [Oriented to Place] : oriented to place [Oriented to Time] : oriented to time [Calm] : calm [de-identified] : A&Ox3, NAD [de-identified] : HTN, diabetic small vessel disease  [de-identified] : s/p right foot sesamoidectomy, 3/5 strength in all quadrants bilaterally [de-identified] : right foot plantar diabetic ulcer down to skin, subcutaneous tissue, fat, bone, healing well , clean , granular and smaller  [de-identified] : Diminished light touch sensation bilaterally [FreeTextEntry1] : Plantar Foot 1 st met [FreeTextEntry2] : 0.6 [FreeTextEntry3] : 0.2 [FreeTextEntry4] : 0.1-0.2 [de-identified] : serosanguineous [de-identified] : 100% [de-identified] : Anni  [de-identified] : Cleansed with Normal saline\par \par  [TWNoteComboBox1] : Right [TWNoteComboBox5] : No [TWNoteComboBox4] : Small [TWNoteComboBox6] : Surgical [de-identified] : No [de-identified] : Normal [de-identified] : None [de-identified] : None [de-identified] : 3x Weekly [de-identified] : No [de-identified] : Primary Dressing

## 2021-05-07 NOTE — PHYSICAL EXAM
[4 x 4] : 4 x 4  [2+] : left 2+ [Ankle Swelling (On Exam)] : not present [Varicose Veins Of Lower Extremities] : not present [] : not present [Skin Ulcer] : ulcer [Alert] : alert [Oriented to Person] : oriented to person [Oriented to Place] : oriented to place [Oriented to Time] : oriented to time [Calm] : calm [de-identified] : HTN, diabetic small vessel disease  [de-identified] : A&Ox3, NAD [de-identified] : s/p right foot sesamoidectomy, 3/5 strength in all quadrants bilaterally [de-identified] : right foot plantar diabetic ulcer down to skin, subcutaneous tissue, fat, bone, healing well , clean , granular and smaller  [de-identified] : Diminished light touch sensation bilaterally [FreeTextEntry1] : Plantar Foot 1 st met [FreeTextEntry2] : 0.6 [FreeTextEntry3] : 0.2 [FreeTextEntry4] : 0.1-0.2 [de-identified] : serosanguineous [de-identified] : 100% [de-identified] : Anni  [de-identified] : Cleansed with Normal saline\par \par  [TWNoteComboBox1] : Right [TWNoteComboBox4] : Small [TWNoteComboBox5] : No [TWNoteComboBox6] : Surgical [de-identified] : No [de-identified] : Normal [de-identified] : None [de-identified] : None [de-identified] : No [de-identified] : 3x Weekly [de-identified] : Primary Dressing

## 2021-05-07 NOTE — REVIEW OF SYSTEMS
[Fever] : no fever [Eye Pain] : no eye pain [Earache] : no earache [Chest Pain] : no chest pain [Shortness Of Breath] : no shortness of breath [Cough] : no cough [Abdominal Pain] : no abdominal pain [Joint Stiffness] : joint stiffness [Skin Wound] : skin wound [Anxiety] : no anxiety [Easy Bleeding] : no tendency for easy bleeding [FreeTextEntry2] : morbid obesity [FreeTextEntry5] : HTN [FreeTextEntry9] : morbid obesity  [de-identified] : right foot plantar diabetic ulcer down to skin, subcutaneous tissue, fat, bone, healing well [de-identified] : diabetic neuropathy [de-identified] : KENNY

## 2021-05-07 NOTE — HISTORY OF PRESENT ILLNESS
[FreeTextEntry1] : DFU plantar 1st metatarsal right foot , healing well , smaller , granular and clean

## 2021-05-07 NOTE — PLAN
[FreeTextEntry1] : continue off loading and wound care Spent 20 minutes for patient care and medical decision making.\par

## 2021-05-07 NOTE — REVIEW OF SYSTEMS
[Fever] : no fever [Eye Pain] : no eye pain [Earache] : no earache [Chest Pain] : no chest pain [Shortness Of Breath] : no shortness of breath [Cough] : no cough [Abdominal Pain] : no abdominal pain [Joint Stiffness] : joint stiffness [Skin Wound] : skin wound [Anxiety] : no anxiety [Easy Bleeding] : no tendency for easy bleeding [FreeTextEntry2] : morbid obesity [FreeTextEntry5] : HTN [FreeTextEntry9] : morbid obesity  [de-identified] : right foot plantar diabetic ulcer down to skin, subcutaneous tissue, fat, bone, healing well [de-identified] : diabetic neuropathy [de-identified] : KENNY

## 2021-05-08 DIAGNOSIS — Z82.5 FAMILY HISTORY OF ASTHMA AND OTHER CHRONIC LOWER RESPIRATORY DISEASES: ICD-10-CM

## 2021-05-08 DIAGNOSIS — Z79.4 LONG TERM (CURRENT) USE OF INSULIN: ICD-10-CM

## 2021-05-08 DIAGNOSIS — E11.621 TYPE 2 DIABETES MELLITUS WITH FOOT ULCER: ICD-10-CM

## 2021-05-08 DIAGNOSIS — Z79.82 LONG TERM (CURRENT) USE OF ASPIRIN: ICD-10-CM

## 2021-05-08 DIAGNOSIS — Z79.899 OTHER LONG TERM (CURRENT) DRUG THERAPY: ICD-10-CM

## 2021-05-08 DIAGNOSIS — Z88.0 ALLERGY STATUS TO PENICILLIN: ICD-10-CM

## 2021-05-08 DIAGNOSIS — Z83.3 FAMILY HISTORY OF DIABETES MELLITUS: ICD-10-CM

## 2021-05-08 DIAGNOSIS — Z98.49 CATARACT EXTRACTION STATUS, UNSPECIFIED EYE: ICD-10-CM

## 2021-05-08 DIAGNOSIS — E11.40 TYPE 2 DIABETES MELLITUS WITH DIABETIC NEUROPATHY, UNSPECIFIED: ICD-10-CM

## 2021-05-08 DIAGNOSIS — L97.416 NON-PRESSURE CHRONIC ULCER OF RIGHT HEEL AND MIDFOOT WITH BONE INVOLVEMENT WITHOUT EVIDENCE OF NECROSIS: ICD-10-CM

## 2021-05-08 DIAGNOSIS — Z98.890 OTHER SPECIFIED POSTPROCEDURAL STATES: ICD-10-CM

## 2021-05-12 NOTE — REASON FOR VISIT
[FreeTextEntry5] : Right Plantar foot 1 st Met [FreeTextEntry4] : DFU 3 plantar right foot  1st metatarsal

## 2021-05-12 NOTE — PROCEDURE
[] : No [FreeTextEntry9] : 9763 [de-identified] : DFU right foot  [de-identified] : Juan Carlos [FreeTextEntry6] : DFU 3 plantar right foot  [FreeTextEntry7] : same [de-identified] : 0 [de-identified] : 2cc [de-identified] : kiet

## 2021-05-12 NOTE — ASSESSMENT
[FreeTextEntry2] : Infection Prevention\par Promote Skin Integrity\par Offloading\par Elevation\par Compression Compliance\par Low Na+ Diet\par Maintain acceptable levels of pain\par Demonstrates use of both pharmacological and nonpharmacological pain management interventions\par Encourage Glycemic Control\par Weight reduction Strategies\par HBOT\par  [FreeTextEntry4] : Covid 19 PCR obtained today\par F/U 1 week for assessment and daily for HBOT\par Preauthorization for sharps debridement and Apligraf #5 submitted for next assessment \par MRI of the Right Foot approved, pt scheduling pending

## 2021-05-12 NOTE — PHYSICAL EXAM
[Purpura] : no purpura  [Petechiae] : no petechiae [de-identified] : calm [de-identified] : HTN ,morbid obesity  [de-identified] : DFU 3 plantar right foot  [de-identified] : Diabetic neuropathy  [de-identified] : Circ neurovascular function WNL post Ace wraps, pt expressed comfort.\par Apligraf 44 sq cm\par 100% Used 0% Discarded \par [id number]\par exp: 03/16/2021\par Lot: [id number]\par pH 7.3-7.5\par  [FreeTextEntry1] : Plantar Foot 1 st met [FreeTextEntry2] : 4.0 [FreeTextEntry3] : 1.2 [FreeTextEntry4] : 0.1-0.2 [de-identified] : serosanguineous [de-identified] : 0.2-0.3 cm @ 8-9 o' clock [de-identified] : callus [de-identified] : 90-95% [de-identified] : 5-10% [FreeTextEntry5] : Post Debridement measurements- 4.1 x 1.3 x 0.2-0.3 [de-identified] : Apligraf 44 sq cm- 100% Used 0% Discarded [de-identified] :  to hold dressing in place [de-identified] : Wound Veil and Calcium Alginate [de-identified] : Cleansed with Normal saline\par Reconstituted in NS\par Lot: [id number]\par Exp: 05/01/2023\par \par  [TWNoteComboBox1] : Right [TWNoteComboBox4] : Small [TWNoteComboBox5] : No [TWNoteComboBox6] : Surgical [de-identified] : Yes [de-identified] : other [de-identified] : None [de-identified] : None [de-identified] : >75% [de-identified] : 2.5% Lidocaine Topical [de-identified] : Yes [TWNoteComboBox7] : Arlin [de-identified] : Application of skin substitute [de-identified] : Ace wraps [de-identified] : Weekly [de-identified] : Primary Dressing

## 2021-05-12 NOTE — REVIEW OF SYSTEMS
[FreeTextEntry1] : 7269 [Fever] : no fever [Chills] : no chills [Eye Pain] : no eye pain [Loss Of Hearing] : no hearing loss [Shortness Of Breath] : no shortness of breath [Abdominal Pain] : no abdominal pain [Anxiety] : no anxiety [Easy Bleeding] : no tendency for easy bleeding [FreeTextEntry5] : HTN [de-identified] : DFU 3 plantar right foot  [de-identified] : IDDM with neuropathy  [de-identified] : KENNY

## 2021-05-21 ENCOUNTER — RESULT REVIEW (OUTPATIENT)
Age: 71
End: 2021-05-21

## 2021-05-21 ENCOUNTER — APPOINTMENT (OUTPATIENT)
Dept: PODIATRY | Facility: HOSPITAL | Age: 71
End: 2021-05-21
Payer: MEDICARE

## 2021-05-21 ENCOUNTER — OUTPATIENT (OUTPATIENT)
Dept: OUTPATIENT SERVICES | Facility: HOSPITAL | Age: 71
LOS: 1 days | Discharge: HOME | End: 2021-05-21
Payer: MEDICARE

## 2021-05-21 VITALS
WEIGHT: 293 LBS | HEIGHT: 71 IN | RESPIRATION RATE: 24 BRPM | BODY MASS INDEX: 41.02 KG/M2 | HEART RATE: 69 BPM | OXYGEN SATURATION: 96 % | SYSTOLIC BLOOD PRESSURE: 154 MMHG | DIASTOLIC BLOOD PRESSURE: 70 MMHG

## 2021-05-21 DIAGNOSIS — S02.91XA UNSPECIFIED FRACTURE OF SKULL, INITIAL ENCOUNTER FOR CLOSED FRACTURE: Chronic | ICD-10-CM

## 2021-05-21 DIAGNOSIS — E11.621 TYPE 2 DIABETES MELLITUS WITH FOOT ULCER: ICD-10-CM

## 2021-05-21 DIAGNOSIS — Z98.890 OTHER SPECIFIED POSTPROCEDURAL STATES: Chronic | ICD-10-CM

## 2021-05-21 DIAGNOSIS — Z86.69 PERSONAL HISTORY OF OTHER DISEASES OF THE NERVOUS SYSTEM AND SENSE ORGANS: Chronic | ICD-10-CM

## 2021-05-21 DIAGNOSIS — M75.00 ADHESIVE CAPSULITIS OF UNSPECIFIED SHOULDER: Chronic | ICD-10-CM

## 2021-05-21 PROCEDURE — 99213 OFFICE O/P EST LOW 20 MIN: CPT

## 2021-05-21 NOTE — REVIEW OF SYSTEMS
[Fever] : no fever [Eye Pain] : no eye pain [Earache] : no earache [Chest Pain] : no chest pain [Shortness Of Breath] : no shortness of breath [Cough] : no cough [Abdominal Pain] : no abdominal pain [Joint Stiffness] : joint stiffness [Skin Wound] : skin wound [Anxiety] : no anxiety [Easy Bleeding] : no tendency for easy bleeding [FreeTextEntry2] : morbid obesity [FreeTextEntry5] : HTN [FreeTextEntry9] : morbid obesity  [de-identified] : right foot plantar diabetic ulcer down to skin, subcutaneous tissue, fat, bone, healing well [de-identified] : diabetic neuropathy [de-identified] : KENNY

## 2021-05-21 NOTE — VITALS
[] : No [de-identified] : Pt rates pain 0/10.  Patient denies any pain or discomfort at the present

## 2021-05-21 NOTE — PLAN
[FreeTextEntry1] : continue off loading and wound care , repeat x rays , compare interval changes , r/o osteomyelitis Spent 20 minutes for patient care and medical decision making.\par

## 2021-05-21 NOTE — HISTORY OF PRESENT ILLNESS
[FreeTextEntry1] : DFU 3 plantar right 1st metatarsal , smaller but probes to bone , drains serous fluid , no SOI

## 2021-05-21 NOTE — ASSESSMENT
[Verbal] : Verbal [Written] : Written [Patient] : Patient [Good - alert, interested, motivated] : Good - alert, interested, motivated [Verbalizes knowledge/Understanding] : Verbalizes knowledge/understanding [Dressing changes] : dressing changes [Foot Care] : foot care [Skin Care] : skin care [Pressure relief] : pressure relief [Signs and symptoms of infection] : sign and symptoms of infection [Nutrition] : nutrition [How and When to Call] : how and when to call [Off-loading] : off-loading [Patient responsibility to plan of care] : patient responsibility to plan of care [Stable] : stable [Home] : Home [Cane] : Cane [Not Applicable - Long Term Care/Home Health Agency] : Long Term Care/Home Health Agency: Not Applicable [] : No [FreeTextEntry2] : Infection Prevention\par Localized Wound Care\par Offloading / Pressure Relief\par Promote Optimal Skin Integrity\par Maintain acceptable pain level with use of pharmacological and nonpharmacological interventions  [FreeTextEntry3] : Probes to Bone  [FreeTextEntry4] : F/U to [facility] for an assessment in One Week \par Script for an X-Ray of the Right provided for pt

## 2021-05-21 NOTE — PHYSICAL EXAM
[4 x 4] : 4 x 4  [2+] : left 2+ [Ankle Swelling (On Exam)] : not present [Varicose Veins Of Lower Extremities] : not present [] : not present [Skin Ulcer] : ulcer [Alert] : alert [Oriented to Person] : oriented to person [Oriented to Place] : oriented to place [Oriented to Time] : oriented to time [Calm] : calm [de-identified] : A&Ox3, NAD [de-identified] : HTN, diabetic small vessel disease  [de-identified] : s/p right foot sesamoidectomy, 3/5 strength in all quadrants bilaterally [de-identified] : right foot plantar diabetic ulcer down to skin, subcutaneous tissue, fat, bone, healing well , clean , granular and smaller  [de-identified] : Diminished light touch sensation bilaterally [de-identified] : Callus Shaved by AMELIA [FreeTextEntry1] : Plantar Foot 1 st met [FreeTextEntry2] : 0.5 [FreeTextEntry3] : 0.2 [FreeTextEntry4] : Probes to Bone  [de-identified] : Serosanguineous [de-identified] : Callus  [de-identified] : 100% [de-identified] : Silver Alginate  [de-identified] : Cleansed with Normal saline\par Cloth Tape \par  [TWNoteComboBox1] : Right [TWNoteComboBox4] : Small [TWNoteComboBox5] : No [TWNoteComboBox6] : Surgical [de-identified] : No [de-identified] : Normal [de-identified] : None [de-identified] : None [de-identified] : No [de-identified] : 3x Weekly [de-identified] : Primary Dressing

## 2021-05-23 DIAGNOSIS — E66.01 MORBID (SEVERE) OBESITY DUE TO EXCESS CALORIES: ICD-10-CM

## 2021-05-23 DIAGNOSIS — Z79.82 LONG TERM (CURRENT) USE OF ASPIRIN: ICD-10-CM

## 2021-05-23 DIAGNOSIS — L84 CORNS AND CALLOSITIES: ICD-10-CM

## 2021-05-23 DIAGNOSIS — Z82.5 FAMILY HISTORY OF ASTHMA AND OTHER CHRONIC LOWER RESPIRATORY DISEASES: ICD-10-CM

## 2021-05-23 DIAGNOSIS — E11.40 TYPE 2 DIABETES MELLITUS WITH DIABETIC NEUROPATHY, UNSPECIFIED: ICD-10-CM

## 2021-05-23 DIAGNOSIS — Z79.4 LONG TERM (CURRENT) USE OF INSULIN: ICD-10-CM

## 2021-05-23 DIAGNOSIS — Z98.49 CATARACT EXTRACTION STATUS, UNSPECIFIED EYE: ICD-10-CM

## 2021-05-23 DIAGNOSIS — Z79.899 OTHER LONG TERM (CURRENT) DRUG THERAPY: ICD-10-CM

## 2021-05-23 DIAGNOSIS — E11.621 TYPE 2 DIABETES MELLITUS WITH FOOT ULCER: ICD-10-CM

## 2021-05-23 DIAGNOSIS — Z83.3 FAMILY HISTORY OF DIABETES MELLITUS: ICD-10-CM

## 2021-05-23 DIAGNOSIS — L97.416 NON-PRESSURE CHRONIC ULCER OF RIGHT HEEL AND MIDFOOT WITH BONE INVOLVEMENT WITHOUT EVIDENCE OF NECROSIS: ICD-10-CM

## 2021-05-23 DIAGNOSIS — Z88.0 ALLERGY STATUS TO PENICILLIN: ICD-10-CM

## 2021-05-23 DIAGNOSIS — Z98.890 OTHER SPECIFIED POSTPROCEDURAL STATES: ICD-10-CM

## 2021-05-24 PROCEDURE — 73630 X-RAY EXAM OF FOOT: CPT | Mod: 26,RT

## 2021-05-24 PROCEDURE — G0463: CPT

## 2021-05-24 PROCEDURE — 73630 X-RAY EXAM OF FOOT: CPT

## 2021-05-27 ENCOUNTER — OUTPATIENT (OUTPATIENT)
Dept: OUTPATIENT SERVICES | Facility: HOSPITAL | Age: 71
LOS: 1 days | Discharge: HOME | End: 2021-05-27
Payer: MEDICARE

## 2021-05-27 ENCOUNTER — APPOINTMENT (OUTPATIENT)
Dept: PODIATRY | Facility: HOSPITAL | Age: 71
End: 2021-05-27
Payer: MEDICARE

## 2021-05-27 VITALS
DIASTOLIC BLOOD PRESSURE: 65 MMHG | RESPIRATION RATE: 18 BRPM | SYSTOLIC BLOOD PRESSURE: 149 MMHG | BODY MASS INDEX: 41.02 KG/M2 | WEIGHT: 293 LBS | HEART RATE: 67 BPM | TEMPERATURE: 97 F | OXYGEN SATURATION: 95 % | HEIGHT: 71 IN

## 2021-05-27 DIAGNOSIS — S02.91XA UNSPECIFIED FRACTURE OF SKULL, INITIAL ENCOUNTER FOR CLOSED FRACTURE: Chronic | ICD-10-CM

## 2021-05-27 DIAGNOSIS — Z98.890 OTHER SPECIFIED POSTPROCEDURAL STATES: Chronic | ICD-10-CM

## 2021-05-27 DIAGNOSIS — E11.621 TYPE 2 DIABETES MELLITUS WITH FOOT ULCER: ICD-10-CM

## 2021-05-27 DIAGNOSIS — Z86.69 PERSONAL HISTORY OF OTHER DISEASES OF THE NERVOUS SYSTEM AND SENSE ORGANS: Chronic | ICD-10-CM

## 2021-05-27 DIAGNOSIS — M75.00 ADHESIVE CAPSULITIS OF UNSPECIFIED SHOULDER: Chronic | ICD-10-CM

## 2021-05-27 PROCEDURE — 87186 SC STD MICRODIL/AGAR DIL: CPT

## 2021-05-27 PROCEDURE — 87070 CULTURE OTHR SPECIMN AEROBIC: CPT

## 2021-05-27 PROCEDURE — 87077 CULTURE AEROBIC IDENTIFY: CPT

## 2021-05-27 PROCEDURE — G0463: CPT

## 2021-05-27 PROCEDURE — 99213 OFFICE O/P EST LOW 20 MIN: CPT

## 2021-05-27 NOTE — PHYSICAL EXAM
[4 x 4] : 4 x 4  [2+] : left 2+ [Ankle Swelling (On Exam)] : not present [Varicose Veins Of Lower Extremities] : not present [] : not present [Skin Ulcer] : ulcer [Alert] : alert [Oriented to Person] : oriented to person [Oriented to Place] : oriented to place [Oriented to Time] : oriented to time [Calm] : calm [de-identified] : A&Ox3, NAD [de-identified] : HTN, diabetic small vessel disease  [de-identified] : s/p right foot sesamoidectomy, 3/5 strength in all quadrants bilaterally [de-identified] : right foot plantar diabetic ulcer down to skin, subcutaneous tissue, fat, bone, healing well , clean , granular and smaller  [de-identified] : Diminished light touch sensation bilaterally [FreeTextEntry1] : Plantar Foot 1 st met [FreeTextEntry2] : 0.8 [FreeTextEntry3] : 0.4 [FreeTextEntry4] : Probes to Bone  [de-identified] : Serosanguineous [de-identified] : 0.1-0.4cm @ 10-1 o'clock [de-identified] : moderately macerated [de-identified] : 100% [de-identified] : Silver Alginate  [de-identified] : Cleansed with Normal saline\par  [TWNoteComboBox1] : Right [TWNoteComboBox4] : Small [TWNoteComboBox5] : No [TWNoteComboBox6] : Surgical [de-identified] : Yes [de-identified] : other [de-identified] : None [de-identified] : None [de-identified] : No [de-identified] : 3x Weekly [de-identified] : Primary Dressing

## 2021-05-27 NOTE — VITALS
[Pain related to present condition?] : The patient's  pain is not related to present condition. [] : No [de-identified] : 0

## 2021-05-27 NOTE — REVIEW OF SYSTEMS
[Fever] : no fever [Eye Pain] : no eye pain [Earache] : no earache [Loss Of Hearing] : no hearing loss [Chest Pain] : no chest pain [Shortness Of Breath] : no shortness of breath [Cough] : no cough [Abdominal Pain] : no abdominal pain [Joint Stiffness] : joint stiffness [Skin Wound] : skin wound [Anxiety] : no anxiety [Easy Bleeding] : no tendency for easy bleeding [FreeTextEntry2] : morbid obesity [FreeTextEntry5] : HTN , HLD [FreeTextEntry9] : morbid obesity  [de-identified] : right foot plantar diabetic ulcer down to skin, subcutaneous tissue, fat, bone, healing well [de-identified] : diabetic neuropathy [de-identified] : KENNY

## 2021-05-27 NOTE — ASSESSMENT
[Verbal] : Verbal [Written] : Written [Patient] : Patient [Good - alert, interested, motivated] : Good - alert, interested, motivated [Verbalizes knowledge/Understanding] : Verbalizes knowledge/understanding [Dressing changes] : dressing changes [Foot Care] : foot care [Skin Care] : skin care [Pressure relief] : pressure relief [Signs and symptoms of infection] : sign and symptoms of infection [Nutrition] : nutrition [How and When to Call] : how and when to call [Off-loading] : off-loading [Patient responsibility to plan of care] : patient responsibility to plan of care [Stable] : stable [Home] : Home [Cane] : Cane [Not Applicable - Long Term Care/Home Health Agency] : Long Term Care/Home Health Agency: Not Applicable [] : No [FreeTextEntry2] : Infection Prevention\par Localized Wound Care\par Offloading / Pressure Relief\par Promote Optimal Skin Integrity\par Maintain acceptable pain level with use of pharmacological and nonpharmacological interventions\par MRI \par ID consult\par Culture\par F/U 1 week  [FreeTextEntry3] : Wounds measures larger [FreeTextEntry4] : DPM reviewed xray results with patient, patient verbalized understanding of results.\par DPM ordered an MRI, patient was provided with a copy of the prescription\par Authorization for MRI submitted today\par ID consult requested as per DPM, request submitted today\par Culture obtained\par F/U 1 week \par

## 2021-05-27 NOTE — PLAN
[FreeTextEntry1] : Patient to have consult with Dr Guerrero , C&S taken , wound is clean but probes to bone , MRI requested . Possible further surgical resection of the 1st metatarsal right foot . Patient understands   Spent 20 minutes for patient care and medical decision making.\par \par

## 2021-05-28 ENCOUNTER — NON-APPOINTMENT (OUTPATIENT)
Age: 71
End: 2021-05-28

## 2021-05-29 LAB
-  AMPICILLIN/SULBACTAM: SIGNIFICANT CHANGE UP
-  CEFAZOLIN: SIGNIFICANT CHANGE UP
-  CLINDAMYCIN: SIGNIFICANT CHANGE UP
-  ERYTHROMYCIN: SIGNIFICANT CHANGE UP
-  GENTAMICIN: SIGNIFICANT CHANGE UP
-  OXACILLIN: SIGNIFICANT CHANGE UP
-  RIFAMPIN: SIGNIFICANT CHANGE UP
-  TETRACYCLINE: SIGNIFICANT CHANGE UP
-  TRIMETHOPRIM/SULFAMETHOXAZOLE: SIGNIFICANT CHANGE UP
-  VANCOMYCIN: SIGNIFICANT CHANGE UP
CULTURE RESULTS: SIGNIFICANT CHANGE UP
METHOD TYPE: SIGNIFICANT CHANGE UP
ORGANISM # SPEC MICROSCOPIC CNT: SIGNIFICANT CHANGE UP
ORGANISM # SPEC MICROSCOPIC CNT: SIGNIFICANT CHANGE UP
SPECIMEN SOURCE: SIGNIFICANT CHANGE UP

## 2021-05-30 DIAGNOSIS — Z79.4 LONG TERM (CURRENT) USE OF INSULIN: ICD-10-CM

## 2021-05-30 DIAGNOSIS — Z79.82 LONG TERM (CURRENT) USE OF ASPIRIN: ICD-10-CM

## 2021-05-30 DIAGNOSIS — Z88.0 ALLERGY STATUS TO PENICILLIN: ICD-10-CM

## 2021-05-30 DIAGNOSIS — L84 CORNS AND CALLOSITIES: ICD-10-CM

## 2021-05-30 DIAGNOSIS — Z83.3 FAMILY HISTORY OF DIABETES MELLITUS: ICD-10-CM

## 2021-05-30 DIAGNOSIS — Z98.49 CATARACT EXTRACTION STATUS, UNSPECIFIED EYE: ICD-10-CM

## 2021-05-30 DIAGNOSIS — E11.40 TYPE 2 DIABETES MELLITUS WITH DIABETIC NEUROPATHY, UNSPECIFIED: ICD-10-CM

## 2021-05-30 DIAGNOSIS — Z82.5 FAMILY HISTORY OF ASTHMA AND OTHER CHRONIC LOWER RESPIRATORY DISEASES: ICD-10-CM

## 2021-05-30 DIAGNOSIS — Z98.890 OTHER SPECIFIED POSTPROCEDURAL STATES: ICD-10-CM

## 2021-05-30 DIAGNOSIS — L97.416 NON-PRESSURE CHRONIC ULCER OF RIGHT HEEL AND MIDFOOT WITH BONE INVOLVEMENT WITHOUT EVIDENCE OF NECROSIS: ICD-10-CM

## 2021-05-30 DIAGNOSIS — E66.01 MORBID (SEVERE) OBESITY DUE TO EXCESS CALORIES: ICD-10-CM

## 2021-05-30 DIAGNOSIS — Z79.899 OTHER LONG TERM (CURRENT) DRUG THERAPY: ICD-10-CM

## 2021-05-30 DIAGNOSIS — E11.621 TYPE 2 DIABETES MELLITUS WITH FOOT ULCER: ICD-10-CM

## 2021-06-01 ENCOUNTER — OUTPATIENT (OUTPATIENT)
Dept: OUTPATIENT SERVICES | Facility: HOSPITAL | Age: 71
LOS: 1 days | Discharge: HOME | End: 2021-06-01
Payer: MEDICARE

## 2021-06-01 ENCOUNTER — APPOINTMENT (OUTPATIENT)
Dept: PODIATRY | Facility: HOSPITAL | Age: 71
End: 2021-06-01
Payer: MEDICARE

## 2021-06-01 VITALS
DIASTOLIC BLOOD PRESSURE: 73 MMHG | RESPIRATION RATE: 18 BRPM | SYSTOLIC BLOOD PRESSURE: 163 MMHG | OXYGEN SATURATION: 95 % | BODY MASS INDEX: 41.02 KG/M2 | TEMPERATURE: 97.3 F | WEIGHT: 293 LBS | HEIGHT: 71 IN | HEART RATE: 73 BPM

## 2021-06-01 DIAGNOSIS — S02.91XA UNSPECIFIED FRACTURE OF SKULL, INITIAL ENCOUNTER FOR CLOSED FRACTURE: Chronic | ICD-10-CM

## 2021-06-01 DIAGNOSIS — M75.00 ADHESIVE CAPSULITIS OF UNSPECIFIED SHOULDER: Chronic | ICD-10-CM

## 2021-06-01 DIAGNOSIS — E11.621 TYPE 2 DIABETES MELLITUS WITH FOOT ULCER: ICD-10-CM

## 2021-06-01 DIAGNOSIS — Z98.890 OTHER SPECIFIED POSTPROCEDURAL STATES: Chronic | ICD-10-CM

## 2021-06-01 DIAGNOSIS — Z86.69 PERSONAL HISTORY OF OTHER DISEASES OF THE NERVOUS SYSTEM AND SENSE ORGANS: Chronic | ICD-10-CM

## 2021-06-01 PROCEDURE — G0463: CPT

## 2021-06-01 PROCEDURE — 99213 OFFICE O/P EST LOW 20 MIN: CPT

## 2021-06-01 PROCEDURE — 99203 OFFICE O/P NEW LOW 30 MIN: CPT

## 2021-06-02 DIAGNOSIS — Z82.5 FAMILY HISTORY OF ASTHMA AND OTHER CHRONIC LOWER RESPIRATORY DISEASES: ICD-10-CM

## 2021-06-02 DIAGNOSIS — Z79.4 LONG TERM (CURRENT) USE OF INSULIN: ICD-10-CM

## 2021-06-02 DIAGNOSIS — Z98.890 OTHER SPECIFIED POSTPROCEDURAL STATES: ICD-10-CM

## 2021-06-02 DIAGNOSIS — Z98.49 CATARACT EXTRACTION STATUS, UNSPECIFIED EYE: ICD-10-CM

## 2021-06-02 DIAGNOSIS — Z49.01 ENCOUNTER FOR FITTING AND ADJUSTMENT OF EXTRACORPOREAL DIALYSIS CATHETER: ICD-10-CM

## 2021-06-02 DIAGNOSIS — Z79.82 LONG TERM (CURRENT) USE OF ASPIRIN: ICD-10-CM

## 2021-06-02 DIAGNOSIS — E11.40 TYPE 2 DIABETES MELLITUS WITH DIABETIC NEUROPATHY, UNSPECIFIED: ICD-10-CM

## 2021-06-02 DIAGNOSIS — E11.621 TYPE 2 DIABETES MELLITUS WITH FOOT ULCER: ICD-10-CM

## 2021-06-02 DIAGNOSIS — E66.01 MORBID (SEVERE) OBESITY DUE TO EXCESS CALORIES: ICD-10-CM

## 2021-06-02 DIAGNOSIS — Z88.0 ALLERGY STATUS TO PENICILLIN: ICD-10-CM

## 2021-06-02 DIAGNOSIS — Z83.3 FAMILY HISTORY OF DIABETES MELLITUS: ICD-10-CM

## 2021-06-02 DIAGNOSIS — L84 CORNS AND CALLOSITIES: ICD-10-CM

## 2021-06-02 DIAGNOSIS — Z79.899 OTHER LONG TERM (CURRENT) DRUG THERAPY: ICD-10-CM

## 2021-06-02 DIAGNOSIS — L97.416 NON-PRESSURE CHRONIC ULCER OF RIGHT HEEL AND MIDFOOT WITH BONE INVOLVEMENT WITHOUT EVIDENCE OF NECROSIS: ICD-10-CM

## 2021-06-02 NOTE — PHYSICAL EXAM
[4 x 4] : 4 x 4  [2+] : left 2+ [Ankle Swelling (On Exam)] : not present [Varicose Veins Of Lower Extremities] : not present [] : not present [Skin Ulcer] : ulcer [Alert] : alert [Oriented to Person] : oriented to person [Oriented to Place] : oriented to place [Oriented to Time] : oriented to time [Calm] : calm [de-identified] : A&Ox3, NAD [de-identified] : HTN, diabetic small vessel disease  [de-identified] : s/p right foot sesamoidectomy, 3/5 strength in all quadrants bilaterally [de-identified] : right foot plantar diabetic ulcer down to skin, subcutaneous tissue, fat, bone, DFU 3 , clean , granular and smaller  [de-identified] : Diminished light touch sensation bilaterally [FreeTextEntry1] : Plantar Foot 1 st met [FreeTextEntry2] : 0.7 [FreeTextEntry3] : 0.4 [FreeTextEntry4] : Probes to Bone  [de-identified] : Serosanguineous [de-identified] : 0.4cm @ 10-1 o'clock [de-identified] : mildly macerated [de-identified] : 100% [de-identified] : Silver Alginate  [de-identified] : Cleansed with Normal saline\par  [TWNoteComboBox1] : Right [TWNoteComboBox4] : Small [TWNoteComboBox5] : No [TWNoteComboBox6] : Surgical [de-identified] : Yes [de-identified] : other [de-identified] : None [de-identified] : None [de-identified] : No [de-identified] : 3x Weekly [de-identified] : Primary Dressing

## 2021-06-02 NOTE — ASSESSMENT
[Verbal] : Verbal [Written] : Written [Patient] : Patient [Good - alert, interested, motivated] : Good - alert, interested, motivated [Verbalizes knowledge/Understanding] : Verbalizes knowledge/understanding [Dressing changes] : dressing changes [Foot Care] : foot care [Skin Care] : skin care [Pressure relief] : pressure relief [Signs and symptoms of infection] : sign and symptoms of infection [Nutrition] : nutrition [How and When to Call] : how and when to call [Off-loading] : off-loading [Patient responsibility to plan of care] : patient responsibility to plan of care [Stable] : stable [Home] : Home [Cane] : Cane [Not Applicable - Long Term Care/Home Health Agency] : Long Term Care/Home Health Agency: Not Applicable [] : Yes [FreeTextEntry1] : WIll need an MRI to see if there is OM or not, if no OM, can treat with one week of antibiotics for MSSA. If there is OM then will treat surgically and with ABx. will follow next week to check MRI result. \par Patient was given the opportunity to ask questions and all questions were answered to their satisfaction.\par Counseling included lab results, differential diagnosis, treatment options, risks and benefits, lifestyle changes, current condition, medications and dose adjustments.\par The patient verbalized understanding.\par  [Treatment Education] : treatment education [Universal Precautions] : universal precautions [Anticipatory Guidance] : anticipatory guidance [FreeTextEntry2] : Infection Prevention\par Localized Wound Care\par Offloading / Pressure Relief\par Promote Optimal Skin Integrity\par Maintain acceptable pain level with use of pharmacological and nonpharmacological interventions \par ID consult\par F/U 1 week  [FreeTextEntry4] : Culture results reviewed by DPBOBBI and discussed with patient.\par ID consult performed today with .\par All decisions about moving forward with surgery or IV antibiotics will be made post MRI appointment.\par Patient is scheduled for MRI on 6/4/21\par F/U 1 week  \par \par

## 2021-06-02 NOTE — VITALS
[Pain related to present condition?] : The patient's  pain is not related to present condition. [] : No [de-identified] : 0

## 2021-06-02 NOTE — PLAN
[FreeTextEntry1] : Continue wound care and off  loading , await results of MRI , possible further surgical resection of bone needed , patient understands  Spent 20 minutes for patient care and medical decision making.\par

## 2021-06-02 NOTE — REVIEW OF SYSTEMS
[Negative] : Heme/Lymph [Fever] : no fever [Eye Pain] : no eye pain [Earache] : no earache [Loss Of Hearing] : no hearing loss [Chest Pain] : no chest pain [Shortness Of Breath] : no shortness of breath [Cough] : no cough [Abdominal Pain] : no abdominal pain [Joint Stiffness] : joint stiffness [Skin Wound] : skin wound [Anxiety] : no anxiety [Easy Bleeding] : no tendency for easy bleeding [FreeTextEntry2] : morbid obesity [FreeTextEntry5] : HTN , HLD [FreeTextEntry9] : morbid obesity  [de-identified] : right foot plantar DFU 3  down to skin, subcutaneous tissue, fat, bone, [de-identified] : diabetic neuropathy [de-identified] : KENNY

## 2021-06-02 NOTE — REVIEW OF SYSTEMS
[Negative] : Heme/Lymph [Fever] : no fever [Eye Pain] : no eye pain [Earache] : no earache [Loss Of Hearing] : no hearing loss [Chest Pain] : no chest pain [Shortness Of Breath] : no shortness of breath [Cough] : no cough [Abdominal Pain] : no abdominal pain [Joint Stiffness] : joint stiffness [Skin Wound] : skin wound [Anxiety] : no anxiety [Easy Bleeding] : no tendency for easy bleeding [FreeTextEntry2] : morbid obesity [FreeTextEntry5] : HTN , HLD [FreeTextEntry9] : morbid obesity  [de-identified] : right foot plantar DFU 3  down to skin, subcutaneous tissue, fat, bone, [de-identified] : diabetic neuropathy [de-identified] : KENNY

## 2021-06-02 NOTE — VITALS
[Pain related to present condition?] : The patient's  pain is not related to present condition. [] : No [de-identified] : 0

## 2021-06-02 NOTE — PHYSICAL EXAM
[4 x 4] : 4 x 4  [2+] : left 2+ [Ankle Swelling (On Exam)] : not present [Varicose Veins Of Lower Extremities] : not present [] : not present [Skin Ulcer] : ulcer [Alert] : alert [Oriented to Person] : oriented to person [Oriented to Place] : oriented to place [Oriented to Time] : oriented to time [Calm] : calm [de-identified] : A&Ox3, NAD [de-identified] : HTN, diabetic small vessel disease  [de-identified] : s/p right foot sesamoidectomy, 3/5 strength in all quadrants bilaterally [de-identified] : right foot plantar diabetic ulcer down to skin, subcutaneous tissue, fat, bone, DFU 3 , clean , granular and smaller  [de-identified] : Diminished light touch sensation bilaterally [FreeTextEntry1] : Plantar Foot 1 st met [FreeTextEntry2] : 0.7 [FreeTextEntry3] : 0.4 [FreeTextEntry4] : Probes to Bone  [de-identified] : Serosanguineous [de-identified] : 0.4cm @ 10-1 o'clock [de-identified] : mildly macerated [de-identified] : 100% [de-identified] : Silver Alginate  [de-identified] : Cleansed with Normal saline\par  [TWNoteComboBox1] : Right [TWNoteComboBox4] : Small [TWNoteComboBox5] : No [TWNoteComboBox6] : Surgical [de-identified] : Yes [de-identified] : other [de-identified] : None [de-identified] : None [de-identified] : No [de-identified] : 3x Weekly [de-identified] : Primary Dressing

## 2021-06-04 ENCOUNTER — OUTPATIENT (OUTPATIENT)
Dept: OUTPATIENT SERVICES | Facility: HOSPITAL | Age: 71
LOS: 1 days | End: 2021-06-04

## 2021-06-04 ENCOUNTER — APPOINTMENT (OUTPATIENT)
Dept: MRI IMAGING | Facility: CLINIC | Age: 71
End: 2021-06-04
Payer: MEDICARE

## 2021-06-04 DIAGNOSIS — M75.00 ADHESIVE CAPSULITIS OF UNSPECIFIED SHOULDER: Chronic | ICD-10-CM

## 2021-06-04 DIAGNOSIS — Z86.69 PERSONAL HISTORY OF OTHER DISEASES OF THE NERVOUS SYSTEM AND SENSE ORGANS: Chronic | ICD-10-CM

## 2021-06-04 DIAGNOSIS — Z98.890 OTHER SPECIFIED POSTPROCEDURAL STATES: Chronic | ICD-10-CM

## 2021-06-04 DIAGNOSIS — E11.621 TYPE 2 DIABETES MELLITUS WITH FOOT ULCER: ICD-10-CM

## 2021-06-04 DIAGNOSIS — S02.91XA UNSPECIFIED FRACTURE OF SKULL, INITIAL ENCOUNTER FOR CLOSED FRACTURE: Chronic | ICD-10-CM

## 2021-06-04 PROCEDURE — 73720 MRI LWR EXTREMITY W/O&W/DYE: CPT | Mod: 26,RT

## 2021-06-04 NOTE — ASSESSMENT
[Verbal] : Verbal [Written] : Written [Demo] : Demo [Patient] : Patient [Fair - mild discomfort, physical impairment, low acceptance] : Fair - mild discomfort, physical impairment, low acceptance [Verbalizes knowledge/Understanding] : Verbalizes knowledge/understanding [Foot Care] : foot care [Dressing changes] : dressing changes [Pressure relief] : pressure relief [Signs and symptoms of infection] : sign and symptoms of infection [How and When to Call] : how and when to call [Off-loading] : off-loading [Home Health] : home health [Patient responsibility to plan of care] : patient responsibility to plan of care [Glycemic Control] : glycemic control [Stable] : stable [Home] : Home [Wheelchair] : Wheelchair [Faxed - Long Term Care/Home Health Agency] : Long Term Care/Home Health Agency: Faxed [] : No [FreeTextEntry2] : Infection Prevention\par Glycemic Control\par Offloading/Pressure relief\par Home Health\par HBOT\par Bloodwork\par Chest xray\par F/U 1 week pending authorization for HBOT\par  [FreeTextEntry3] : Initial visit  [FreeTextEntry4] : DPM removed sutures\par DPM ordered HBOT, authorixation submitted today\par TM assessment completed, HBO Orientation video completed, Pre-Procedure Checklist completed, COVID 19 swab completed\par F/U 1 week pending authorization for HBOT [FreeTextEntry1] : [facility]

## 2021-06-04 NOTE — HISTORY OF PRESENT ILLNESS
[FreeTextEntry1] : 70 year old female presents to the [facility] with a right planter medial 1st met wound. The patient reports that she stepped on an insulin needle at home. She noticed her foot was red on 1/8/21. Her foot became bright red over the weekend and she reported to [facility] on 1/ 12/21. She was admitted and had a right foot incision and drainage with sesamoidectomy and removal of foreign body on 1/15/21. She was discharged on 1/20/21 with a PICC Line and NPWT. The patient was recommended to follow up with the [facility] for care.

## 2021-06-04 NOTE — REVIEW OF SYSTEMS
[Skin Wound] : skin wound [Fever] : no fever [Eye Pain] : no eye pain [Earache] : no earache [Chest Pain] : no chest pain [Shortness Of Breath] : no shortness of breath [Cough] : no cough [Abdominal Pain] : no abdominal pain [FreeTextEntry2] : morbid obesity [de-identified] : right foot plantar diabetic ulcer down to skin, subcutaneous tissue, fat, tendon, bone (not necrotic) [de-identified] : diabetic neuropathy

## 2021-06-04 NOTE — PHYSICAL EXAM
[Abdominal Pad] : Abdominal Pad [4 x 4] : 4 x 4  [2+] : left 2+ [Skin Ulcer] : ulcer [Calm] : calm [Ankle Swelling (On Exam)] : not present [Varicose Veins Of Lower Extremities] : not present [] : not present [de-identified] : A&Ox3, NAD [de-identified] : s/p right foot sesamoidectomy, 3/5 strength in all quadrants bilaterally [de-identified] : right foot retension sutures with plantar diabetic ulcer down to skin, subcutaneous tissue, fat, tendon, bone (not necrotic) [de-identified] : Diminished light touch sensation bilaterally [de-identified] : sutures removed by DPM [FreeTextEntry1] : Plantar Medial 1st Met sutures in place  [FreeTextEntry2] : 6.6 [FreeTextEntry3] : 2.2 [FreeTextEntry4] : 0.1-0.4 [de-identified] : serosanguineous [de-identified] : moderately  [de-identified] : 95% [de-identified] : 5% [de-identified] : Silver Alginate [de-identified] : Cleansed with Normal saline\par \par Dressing applied by DPM post assessment [TWNoteComboBox1] : Right [TWNoteComboBox4] : Moderate [TWNoteComboBox5] : No [TWNoteComboBox6] : Surgical [de-identified] : No [de-identified] : Macerated [de-identified] : None [de-identified] : None [de-identified] : Yes [de-identified] : 3x Weekly [de-identified] : Primary Dressing

## 2021-06-04 NOTE — VITALS
[Pain related to present condition?] : The patient's  pain is not related to present condition. [] : No [de-identified] : 0/10 [FreeTextEntry5] : Initial visit

## 2021-06-04 NOTE — PLAN
[FreeTextEntry1] : Patient examined and evaluated at this time.\par Continue local wound care and offloading.\par Sutures removed at this time.\par Patient is a HBOT candidate and will benefit. Will auth for HBOT.\par Spent 30 minutes for patient care and medical decision making.\par

## 2021-06-10 ENCOUNTER — APPOINTMENT (OUTPATIENT)
Dept: PODIATRY | Facility: HOSPITAL | Age: 71
End: 2021-06-10
Payer: MEDICARE

## 2021-06-10 ENCOUNTER — OUTPATIENT (OUTPATIENT)
Dept: OUTPATIENT SERVICES | Facility: HOSPITAL | Age: 71
LOS: 1 days | Discharge: HOME | End: 2021-06-10
Payer: MEDICARE

## 2021-06-10 VITALS
WEIGHT: 293 LBS | BODY MASS INDEX: 41.02 KG/M2 | HEART RATE: 70 BPM | HEIGHT: 71 IN | TEMPERATURE: 97.2 F | SYSTOLIC BLOOD PRESSURE: 171 MMHG | OXYGEN SATURATION: 96 % | DIASTOLIC BLOOD PRESSURE: 65 MMHG | RESPIRATION RATE: 18 BRPM

## 2021-06-10 DIAGNOSIS — Z98.890 OTHER SPECIFIED POSTPROCEDURAL STATES: Chronic | ICD-10-CM

## 2021-06-10 DIAGNOSIS — Z79.899 OTHER LONG TERM (CURRENT) DRUG THERAPY: ICD-10-CM

## 2021-06-10 DIAGNOSIS — E11.621 TYPE 2 DIABETES MELLITUS WITH FOOT ULCER: ICD-10-CM

## 2021-06-10 DIAGNOSIS — Z98.890 OTHER SPECIFIED POSTPROCEDURAL STATES: ICD-10-CM

## 2021-06-10 DIAGNOSIS — Z79.4 LONG TERM (CURRENT) USE OF INSULIN: ICD-10-CM

## 2021-06-10 DIAGNOSIS — L97.416 NON-PRESSURE CHRONIC ULCER OF RIGHT HEEL AND MIDFOOT WITH BONE INVOLVEMENT WITHOUT EVIDENCE OF NECROSIS: ICD-10-CM

## 2021-06-10 DIAGNOSIS — Z98.49 CATARACT EXTRACTION STATUS, UNSPECIFIED EYE: ICD-10-CM

## 2021-06-10 DIAGNOSIS — M86.9 OSTEOMYELITIS, UNSPECIFIED: ICD-10-CM

## 2021-06-10 DIAGNOSIS — Z88.0 ALLERGY STATUS TO PENICILLIN: ICD-10-CM

## 2021-06-10 DIAGNOSIS — S02.91XA UNSPECIFIED FRACTURE OF SKULL, INITIAL ENCOUNTER FOR CLOSED FRACTURE: Chronic | ICD-10-CM

## 2021-06-10 DIAGNOSIS — Z79.82 LONG TERM (CURRENT) USE OF ASPIRIN: ICD-10-CM

## 2021-06-10 DIAGNOSIS — E11.40 TYPE 2 DIABETES MELLITUS WITH DIABETIC NEUROPATHY, UNSPECIFIED: ICD-10-CM

## 2021-06-10 DIAGNOSIS — Z82.5 FAMILY HISTORY OF ASTHMA AND OTHER CHRONIC LOWER RESPIRATORY DISEASES: ICD-10-CM

## 2021-06-10 DIAGNOSIS — E11.69 TYPE 2 DIABETES MELLITUS WITH OTHER SPECIFIED COMPLICATION: ICD-10-CM

## 2021-06-10 DIAGNOSIS — Z86.69 PERSONAL HISTORY OF OTHER DISEASES OF THE NERVOUS SYSTEM AND SENSE ORGANS: Chronic | ICD-10-CM

## 2021-06-10 DIAGNOSIS — L84 CORNS AND CALLOSITIES: ICD-10-CM

## 2021-06-10 DIAGNOSIS — M75.00 ADHESIVE CAPSULITIS OF UNSPECIFIED SHOULDER: Chronic | ICD-10-CM

## 2021-06-10 DIAGNOSIS — Z83.3 FAMILY HISTORY OF DIABETES MELLITUS: ICD-10-CM

## 2021-06-10 PROCEDURE — 87077 CULTURE AEROBIC IDENTIFY: CPT

## 2021-06-10 PROCEDURE — 99213 OFFICE O/P EST LOW 20 MIN: CPT

## 2021-06-10 PROCEDURE — 87186 SC STD MICRODIL/AGAR DIL: CPT

## 2021-06-10 PROCEDURE — 87070 CULTURE OTHR SPECIMN AEROBIC: CPT

## 2021-06-10 PROCEDURE — G0463: CPT

## 2021-06-10 NOTE — PHYSICAL EXAM
[4 x 4] : 4 x 4  [2+] : left 2+ [Ankle Swelling (On Exam)] : not present [Varicose Veins Of Lower Extremities] : not present [] : not present [Skin Ulcer] : ulcer [Alert] : alert [Oriented to Person] : oriented to person [Oriented to Place] : oriented to place [Oriented to Time] : oriented to time [Calm] : calm [de-identified] : A&Ox3, NAD [de-identified] : HTN, diabetic small vessel disease  [de-identified] : s/p right foot sesamoidectomy, 3/5 strength in all quadrants bilaterally [de-identified] : right foot plantar diabetic ulcer down to skin, subcutaneous tissue, fat, bone, DFU 3 , clean , granular probes to bone + OM on MRI  [de-identified] : Diminished light touch sensation bilaterally [FreeTextEntry1] : Plantar Foot 1 st met [FreeTextEntry3] : 0.3 [FreeTextEntry2] : 0.6 [FreeTextEntry4] : Probes to Bone  [de-identified] : Serosanguineous [de-identified] : 0.4cm @ 10-1 o'clock [de-identified] : mildly macerated [de-identified] : 100% [de-identified] : Silver Alginate  [de-identified] : Cleansed with Normal saline\par  [TWNoteComboBox1] : Right [TWNoteComboBox4] : Small [TWNoteComboBox5] : No [TWNoteComboBox6] : Surgical [de-identified] : Yes [de-identified] : other [de-identified] : None [de-identified] : None [de-identified] : No [de-identified] : 3x Weekly [de-identified] : Primary Dressing

## 2021-06-10 NOTE — ASSESSMENT
[Verbal] : Verbal [Patient] : Patient [Good - alert, interested, motivated] : Good - alert, interested, motivated [Verbalizes knowledge/Understanding] : Verbalizes knowledge/understanding [Dressing changes] : dressing changes [Foot Care] : foot care [Skin Care] : skin care [Pressure relief] : pressure relief [Signs and symptoms of infection] : sign and symptoms of infection [Nutrition] : nutrition [How and When to Call] : how and when to call [Off-loading] : off-loading [Patient responsibility to plan of care] : patient responsibility to plan of care [] : Yes [Stable] : stable [Home] : Home [Cane] : Cane [Not Applicable - Long Term Care/Home Health Agency] : Long Term Care/Home Health Agency: Not Applicable [Demo] : Demo [FreeTextEntry2] : Infection Prevention\par Localized Wound Care\par Offloading / Pressure Relief\par Promote Optimal Skin Integrity\par Maintain acceptable pain level with use of pharmacological and nonpharmacological interventions \par ID consult\par F/U 1 week  [FreeTextEntry4] : DPM sent C&S Swab to [facility]. \par DPM reviewed MRI results with Pt. DPM discussed surgical option & IV Picc line with Pt. DPM requests ID consult with . at next WC visit. Pt Verbalized Understanding \par Pt to F/U to C in 1 Week

## 2021-06-10 NOTE — REVIEW OF SYSTEMS
[Fever] : no fever [Eye Pain] : no eye pain [Earache] : no earache [Loss Of Hearing] : no hearing loss [Chest Pain] : no chest pain [Shortness Of Breath] : no shortness of breath [Cough] : no cough [Abdominal Pain] : no abdominal pain [Joint Stiffness] : joint stiffness [Skin Wound] : skin wound [Anxiety] : no anxiety [Easy Bleeding] : no tendency for easy bleeding [FreeTextEntry2] : morbid obesity [FreeTextEntry5] : HTN , HLD [FreeTextEntry9] : morbid obesity  [de-identified] : right foot plantar DFU 3  down to skin, subcutaneous tissue, fat, bone, [de-identified] : diabetic neuropathy [de-identified] : KENNY

## 2021-06-10 NOTE — PLAN
[FreeTextEntry1] : discussed , surgical vs IV antibiotics , patient at this time would like to try antibiotic route . She is aware of all the risks and benefits . . Patient to have consult with Dr Guerrero  Spent 20 minutes for patient care and medical decision making.\par

## 2021-06-15 ENCOUNTER — OUTPATIENT (OUTPATIENT)
Dept: OUTPATIENT SERVICES | Facility: HOSPITAL | Age: 71
LOS: 1 days | Discharge: HOME | End: 2021-06-15
Payer: MEDICARE

## 2021-06-15 ENCOUNTER — APPOINTMENT (OUTPATIENT)
Dept: WOUND CARE | Facility: HOSPITAL | Age: 71
End: 2021-06-15
Payer: MEDICARE

## 2021-06-15 VITALS
HEIGHT: 71 IN | BODY MASS INDEX: 41.02 KG/M2 | RESPIRATION RATE: 18 BRPM | WEIGHT: 293 LBS | TEMPERATURE: 97.5 F | SYSTOLIC BLOOD PRESSURE: 173 MMHG | OXYGEN SATURATION: 65 % | DIASTOLIC BLOOD PRESSURE: 78 MMHG | HEART RATE: 65 BPM

## 2021-06-15 DIAGNOSIS — E11.621 TYPE 2 DIABETES MELLITUS WITH FOOT ULCER: ICD-10-CM

## 2021-06-15 DIAGNOSIS — M75.00 ADHESIVE CAPSULITIS OF UNSPECIFIED SHOULDER: Chronic | ICD-10-CM

## 2021-06-15 DIAGNOSIS — Z98.890 OTHER SPECIFIED POSTPROCEDURAL STATES: Chronic | ICD-10-CM

## 2021-06-15 DIAGNOSIS — Z86.69 PERSONAL HISTORY OF OTHER DISEASES OF THE NERVOUS SYSTEM AND SENSE ORGANS: Chronic | ICD-10-CM

## 2021-06-15 DIAGNOSIS — S02.91XA UNSPECIFIED FRACTURE OF SKULL, INITIAL ENCOUNTER FOR CLOSED FRACTURE: Chronic | ICD-10-CM

## 2021-06-15 PROCEDURE — 99213 OFFICE O/P EST LOW 20 MIN: CPT

## 2021-06-15 PROCEDURE — G0463: CPT

## 2021-06-16 DIAGNOSIS — Z82.5 FAMILY HISTORY OF ASTHMA AND OTHER CHRONIC LOWER RESPIRATORY DISEASES: ICD-10-CM

## 2021-06-16 DIAGNOSIS — L97.416 NON-PRESSURE CHRONIC ULCER OF RIGHT HEEL AND MIDFOOT WITH BONE INVOLVEMENT WITHOUT EVIDENCE OF NECROSIS: ICD-10-CM

## 2021-06-16 DIAGNOSIS — Z98.890 OTHER SPECIFIED POSTPROCEDURAL STATES: ICD-10-CM

## 2021-06-16 DIAGNOSIS — Z79.4 LONG TERM (CURRENT) USE OF INSULIN: ICD-10-CM

## 2021-06-16 DIAGNOSIS — E11.69 TYPE 2 DIABETES MELLITUS WITH OTHER SPECIFIED COMPLICATION: ICD-10-CM

## 2021-06-16 DIAGNOSIS — Z98.49 CATARACT EXTRACTION STATUS, UNSPECIFIED EYE: ICD-10-CM

## 2021-06-16 DIAGNOSIS — M86.671 OTHER CHRONIC OSTEOMYELITIS, RIGHT ANKLE AND FOOT: ICD-10-CM

## 2021-06-16 DIAGNOSIS — Z79.82 LONG TERM (CURRENT) USE OF ASPIRIN: ICD-10-CM

## 2021-06-16 DIAGNOSIS — L84 CORNS AND CALLOSITIES: ICD-10-CM

## 2021-06-16 DIAGNOSIS — Z88.0 ALLERGY STATUS TO PENICILLIN: ICD-10-CM

## 2021-06-16 DIAGNOSIS — Z83.3 FAMILY HISTORY OF DIABETES MELLITUS: ICD-10-CM

## 2021-06-16 DIAGNOSIS — Z79.899 OTHER LONG TERM (CURRENT) DRUG THERAPY: ICD-10-CM

## 2021-06-16 DIAGNOSIS — E11.40 TYPE 2 DIABETES MELLITUS WITH DIABETIC NEUROPATHY, UNSPECIFIED: ICD-10-CM

## 2021-06-16 DIAGNOSIS — E11.621 TYPE 2 DIABETES MELLITUS WITH FOOT ULCER: ICD-10-CM

## 2021-06-16 NOTE — PLAN
[FreeTextEntry1] : Patient seen by Dr Jin ,  6 weeks IV Cefazolin . Patient to seek 2nd opinion . Patient high risk for limb loss . Patient will eventually need surgical intervention to remove the diseased bone . Patient understands all the risk and benefits  Spent 20 minutes for patient care and medical decision making.\par

## 2021-06-16 NOTE — PHYSICAL EXAM
[4 x 4] : 4 x 4  [2+] : left 2+ [Skin Ulcer] : ulcer [Alert] : alert [Oriented to Person] : oriented to person [Oriented to Place] : oriented to place [Oriented to Time] : oriented to time [Calm] : calm [Ankle Swelling (On Exam)] : not present [Varicose Veins Of Lower Extremities] : not present [] : not present [de-identified] : A&Ox3, NAD [de-identified] : HTN, diabetic small vessel disease  [de-identified] : s/p right foot sesamoidectomy, 3/5 strength in all quadrants bilaterally [de-identified] : right foot plantar diabetic ulcer down to skin, subcutaneous tissue, fat, bone, DFU 3 , clean , granular probes to bone + OM on MRI  [de-identified] : Diminished light touch sensation bilaterally [FreeTextEntry1] : Plantar Foot 1 st met [FreeTextEntry2] : 0.6 [FreeTextEntry3] : 0.3 [FreeTextEntry4] : Probes to Bone  [de-identified] : Serosanguineous [de-identified] : 0.4cm @ 10-1 o'clock [de-identified] : mildly macerated [de-identified] : 100% [de-identified] : Silver Alginate  [de-identified] : Cleansed with Normal saline\par  [TWNoteComboBox1] : Right [TWNoteComboBox4] : Small [TWNoteComboBox5] : No [TWNoteComboBox6] : Surgical [de-identified] : Yes [de-identified] : other [de-identified] : None [de-identified] : None [de-identified] : No [de-identified] : 3x Weekly [de-identified] : Primary Dressing

## 2021-06-16 NOTE — REVIEW OF SYSTEMS
[Negative] : Heme/Lymph [Joint Stiffness] : joint stiffness [Skin Wound] : skin wound [Fever] : no fever [Eye Pain] : no eye pain [Earache] : no earache [Loss Of Hearing] : no hearing loss [Chest Pain] : no chest pain [Shortness Of Breath] : no shortness of breath [Cough] : no cough [Abdominal Pain] : no abdominal pain [Anxiety] : no anxiety [Easy Bleeding] : no tendency for easy bleeding [FreeTextEntry2] : morbid obesity [FreeTextEntry5] : HTN , HLD [FreeTextEntry9] : morbid obesity  [de-identified] : right foot plantar DFU 3  down to skin, subcutaneous tissue, fat, bone, serous drainage  [de-identified] : diabetic neuropathy [de-identified] : KENNY

## 2021-06-16 NOTE — HISTORY OF PRESENT ILLNESS
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

## 2021-06-16 NOTE — ASSESSMENT
[Verbal] : Verbal [Demo] : Demo [Patient] : Patient [Good - alert, interested, motivated] : Good - alert, interested, motivated [Verbalizes knowledge/Understanding] : Verbalizes knowledge/understanding [Dressing changes] : dressing changes [Foot Care] : foot care [Skin Care] : skin care [Pressure relief] : pressure relief [Signs and symptoms of infection] : sign and symptoms of infection [Nutrition] : nutrition [How and When to Call] : how and when to call [Off-loading] : off-loading [Patient responsibility to plan of care] : patient responsibility to plan of care [Stable] : stable [Home] : Home [Cane] : Cane [Not Applicable - Long Term Care/Home Health Agency] : Long Term Care/Home Health Agency: Not Applicable [FreeTextEntry1] : I recommend IV cefazolin for 6 weeks. She has appt for 2nd opinion with another podiatry, will wait until next week, she will let us know. If she decides to go with ABx, will do COVID test and within 3 days IR for PICC. \par At any time worsening, redness, fever, or any other problems will go to ED. \par Patient was given the opportunity to ask questions and all questions were answered to their satisfaction.\par Counseling included lab results, differential diagnosis, treatment options, risks and benefits, lifestyle changes, current condition, medications and dose adjustments.\par The patient verbalized understanding.\par  [Treatment Education] : treatment education [Treatment Adherence] : treatment adherence [Drug Interactions / Side Effects] : drug interactions/side effects [Disclosure of Diagnosis] : disclosure of diagnosis [Anticipatory Guidance] : anticipatory guidance [] : No [FreeTextEntry2] : Infection Prevention\par Localized Wound Care\par Offloading / Pressure Relief\par Promote Optimal Skin Integrity\par Maintain acceptable pain level with use of pharmacological and nonpharmacological interventions \par ID consult\par F/U 1 week  [FreeTextEntry4] : Patient also assessed by Dr. Guerrero this visit. As per Dr. Guerrero patient should receive IV ABT TID for 6 weeks. Patient states she has an appointment this Thursday and wants to get a second opinion prior to making a decision and states she will call back and let Dr. Guerrero know.\par Pt to F/U to [facility] in 1 Week

## 2021-06-16 NOTE — REVIEW OF SYSTEMS
[Negative] : Heme/Lymph [Joint Stiffness] : joint stiffness [Skin Wound] : skin wound [Fever] : no fever [Eye Pain] : no eye pain [Earache] : no earache [Loss Of Hearing] : no hearing loss [Chest Pain] : no chest pain [Shortness Of Breath] : no shortness of breath [Cough] : no cough [Abdominal Pain] : no abdominal pain [Anxiety] : no anxiety [Easy Bleeding] : no tendency for easy bleeding [FreeTextEntry2] : morbid obesity [FreeTextEntry5] : HTN , HLD [FreeTextEntry9] : morbid obesity  [de-identified] : right foot plantar DFU 3  down to skin, subcutaneous tissue, fat, bone, serous drainage  [de-identified] : diabetic neuropathy [de-identified] : KENNY

## 2021-06-16 NOTE — ASSESSMENT
[Verbal] : Verbal [Demo] : Demo [Patient] : Patient [Good - alert, interested, motivated] : Good - alert, interested, motivated [Verbalizes knowledge/Understanding] : Verbalizes knowledge/understanding [Dressing changes] : dressing changes [Foot Care] : foot care [Skin Care] : skin care [Pressure relief] : pressure relief [Signs and symptoms of infection] : sign and symptoms of infection [Nutrition] : nutrition [How and When to Call] : how and when to call [Off-loading] : off-loading [Patient responsibility to plan of care] : patient responsibility to plan of care [Stable] : stable [Home] : Home [Cane] : Cane [Not Applicable - Long Term Care/Home Health Agency] : Long Term Care/Home Health Agency: Not Applicable [FreeTextEntry1] : I recommend IV cefazolin for 6 weeks. She has appt for 2nd opinion with another podiatry, will wait until next week, she will let us know. If she decides to go with [facility], will do COVID test and within 3 days IR for PICC. \par At any time worsening, redness, fever, or any other problems will go to ED. \par Patient was given the opportunity to ask questions and all questions were answered to their satisfaction.\par Counseling included lab results, differential diagnosis, treatment options, risks and benefits, lifestyle changes, current condition, medications and dose adjustments.\par The patient verbalized understanding.\par  [Treatment Education] : treatment education [Treatment Adherence] : treatment adherence [Drug Interactions / Side Effects] : drug interactions/side effects [Disclosure of Diagnosis] : disclosure of diagnosis [Anticipatory Guidance] : anticipatory guidance [] : No [FreeTextEntry2] : Infection Prevention\par Localized Wound Care\par Offloading / Pressure Relief\par Promote Optimal Skin Integrity\par Maintain acceptable pain level with use of pharmacological and nonpharmacological interventions \par ID consult\par F/U 1 week  [FreeTextEntry4] : Patient also assessed by Dr. Guerrero this visit. As per Dr. Guerrero patient should receive IV ABT TID for 6 weeks. Patient states she has an appointment this Thursday and wants to get a second opinion prior to making a decision and states she will call back and let Dr. Guerrero know.\par Pt to F/U to [facility] in 1 Week

## 2021-06-22 ENCOUNTER — OUTPATIENT (OUTPATIENT)
Dept: OUTPATIENT SERVICES | Facility: HOSPITAL | Age: 71
LOS: 1 days | Discharge: HOME | End: 2021-06-22
Payer: MEDICARE

## 2021-06-22 ENCOUNTER — APPOINTMENT (OUTPATIENT)
Dept: WOUND CARE | Facility: HOSPITAL | Age: 71
End: 2021-06-22
Payer: MEDICARE

## 2021-06-22 VITALS
BODY MASS INDEX: 41.02 KG/M2 | HEART RATE: 62 BPM | RESPIRATION RATE: 18 BRPM | HEIGHT: 71 IN | OXYGEN SATURATION: 97 % | SYSTOLIC BLOOD PRESSURE: 175 MMHG | WEIGHT: 293 LBS | TEMPERATURE: 97.4 F | DIASTOLIC BLOOD PRESSURE: 86 MMHG

## 2021-06-22 DIAGNOSIS — Z86.69 PERSONAL HISTORY OF OTHER DISEASES OF THE NERVOUS SYSTEM AND SENSE ORGANS: Chronic | ICD-10-CM

## 2021-06-22 DIAGNOSIS — S02.91XA UNSPECIFIED FRACTURE OF SKULL, INITIAL ENCOUNTER FOR CLOSED FRACTURE: Chronic | ICD-10-CM

## 2021-06-22 DIAGNOSIS — M75.00 ADHESIVE CAPSULITIS OF UNSPECIFIED SHOULDER: Chronic | ICD-10-CM

## 2021-06-22 DIAGNOSIS — Z98.890 OTHER SPECIFIED POSTPROCEDURAL STATES: Chronic | ICD-10-CM

## 2021-06-22 DIAGNOSIS — Z11.59 ENCOUNTER FOR SCREENING FOR OTHER VIRAL DISEASES: ICD-10-CM

## 2021-06-22 LAB — SARS-COV-2 RNA SPEC QL NAA+PROBE: SIGNIFICANT CHANGE UP

## 2021-06-22 PROCEDURE — U0003: CPT

## 2021-06-22 PROCEDURE — G0463: CPT

## 2021-06-22 PROCEDURE — ZZZZZ: CPT

## 2021-06-22 PROCEDURE — U0005: CPT

## 2021-06-25 ENCOUNTER — OUTPATIENT (OUTPATIENT)
Dept: OUTPATIENT SERVICES | Facility: HOSPITAL | Age: 71
LOS: 1 days | End: 2021-06-25
Payer: MEDICARE

## 2021-06-25 DIAGNOSIS — Z86.69 PERSONAL HISTORY OF OTHER DISEASES OF THE NERVOUS SYSTEM AND SENSE ORGANS: Chronic | ICD-10-CM

## 2021-06-25 DIAGNOSIS — M75.00 ADHESIVE CAPSULITIS OF UNSPECIFIED SHOULDER: Chronic | ICD-10-CM

## 2021-06-25 DIAGNOSIS — S02.91XA UNSPECIFIED FRACTURE OF SKULL, INITIAL ENCOUNTER FOR CLOSED FRACTURE: Chronic | ICD-10-CM

## 2021-06-25 DIAGNOSIS — Z98.890 OTHER SPECIFIED POSTPROCEDURAL STATES: Chronic | ICD-10-CM

## 2021-06-25 DIAGNOSIS — E11.621 TYPE 2 DIABETES MELLITUS WITH FOOT ULCER: ICD-10-CM

## 2021-06-25 PROCEDURE — 36573 INSJ PICC RS&I 5 YR+: CPT

## 2021-06-25 PROCEDURE — ZZZZZ: CPT

## 2021-06-25 PROCEDURE — C1751: CPT

## 2021-06-25 PROCEDURE — 77001 FLUOROGUIDE FOR VEIN DEVICE: CPT

## 2021-06-25 PROCEDURE — 76937 US GUIDE VASCULAR ACCESS: CPT

## 2021-06-25 NOTE — PROCEDURE NOTE - PROCEDURE FINDINGS AND DETAILS
47cm bard power picc inserted into patent right basilic vein under sterile conditions and ultrasound/ fluoroscopic guidance.

## 2021-06-25 NOTE — PROCEDURE NOTE - NSICDXPROCEDURE_GEN_ALL_CORE_FT
Stable
PROCEDURES:  Insertion of peripherally inserted central catheter (PICC) with imaging guidance 25-Jun-2021 11:07:25  Mehrdad Green

## 2021-07-01 ENCOUNTER — APPOINTMENT (OUTPATIENT)
Dept: WOUND CARE | Facility: HOSPITAL | Age: 71
End: 2021-07-01
Payer: MEDICARE

## 2021-07-01 ENCOUNTER — OUTPATIENT (OUTPATIENT)
Dept: OUTPATIENT SERVICES | Facility: HOSPITAL | Age: 71
LOS: 1 days | Discharge: HOME | End: 2021-07-01
Payer: MEDICARE

## 2021-07-01 VITALS
BODY MASS INDEX: 41.02 KG/M2 | DIASTOLIC BLOOD PRESSURE: 66 MMHG | HEART RATE: 74 BPM | WEIGHT: 293 LBS | SYSTOLIC BLOOD PRESSURE: 142 MMHG | TEMPERATURE: 97.2 F | OXYGEN SATURATION: 97 % | RESPIRATION RATE: 18 BRPM | HEIGHT: 71 IN

## 2021-07-01 DIAGNOSIS — Z86.69 PERSONAL HISTORY OF OTHER DISEASES OF THE NERVOUS SYSTEM AND SENSE ORGANS: Chronic | ICD-10-CM

## 2021-07-01 DIAGNOSIS — Z98.890 OTHER SPECIFIED POSTPROCEDURAL STATES: Chronic | ICD-10-CM

## 2021-07-01 DIAGNOSIS — M75.00 ADHESIVE CAPSULITIS OF UNSPECIFIED SHOULDER: Chronic | ICD-10-CM

## 2021-07-01 DIAGNOSIS — S02.91XA UNSPECIFIED FRACTURE OF SKULL, INITIAL ENCOUNTER FOR CLOSED FRACTURE: Chronic | ICD-10-CM

## 2021-07-01 DIAGNOSIS — E11.621 TYPE 2 DIABETES MELLITUS WITH FOOT ULCER: ICD-10-CM

## 2021-07-01 PROCEDURE — 99213 OFFICE O/P EST LOW 20 MIN: CPT

## 2021-07-01 NOTE — HISTORY OF PRESENT ILLNESS
[FreeTextEntry1] : DFU 3 plantar 1st metatarsal right foot , probes to bone , stable , patient on IV antibiotics

## 2021-07-01 NOTE — PHYSICAL EXAM
[4 x 4] : 4 x 4  [2+] : left 2+ [Ankle Swelling (On Exam)] : not present [Varicose Veins Of Lower Extremities] : not present [] : not present [Skin Ulcer] : ulcer [Alert] : alert [Oriented to Person] : oriented to person [Oriented to Place] : oriented to place [Oriented to Time] : oriented to time [Calm] : calm [de-identified] : A&Ox3, NAD [de-identified] : HTN, diabetic small vessel disease  [de-identified] : morbid obesity  [de-identified] : s/p right foot sesamoidectomy, 3/5 strength in all quadrants bilaterally [de-identified] : right foot plantar diabetic ulcer down to skin, subcutaneous tissue, fat, bone, DFU 3 , clean , granular probes to bone + OM on MRI  [de-identified] : Diminished light touch sensation bilaterally [FreeTextEntry1] : Plantar Foot 1 st met [FreeTextEntry2] : 0.2 [FreeTextEntry3] : 0.3 [FreeTextEntry4] : probes to bone [de-identified] : Callus [de-identified] : Silver Alginate  [de-identified] : Cleansed with Normal saline\par  [TWNoteComboBox1] : Right [TWNoteComboBox4] : None [TWNoteComboBox5] : No [TWNoteComboBox6] : Surgical [de-identified] : Yes [de-identified] : other [de-identified] : None [de-identified] : None [de-identified] : No [de-identified] : 3x Weekly [de-identified] : Primary Dressing

## 2021-07-01 NOTE — REVIEW OF SYSTEMS
[Fever] : no fever [Eye Pain] : no eye pain [Earache] : no earache [Loss Of Hearing] : no hearing loss [Chest Pain] : no chest pain [Shortness Of Breath] : no shortness of breath [Cough] : no cough [Abdominal Pain] : no abdominal pain [Joint Stiffness] : joint stiffness [Skin Wound] : skin wound [Anxiety] : no anxiety [Easy Bleeding] : no tendency for easy bleeding [FreeTextEntry2] : morbid obesity [FreeTextEntry5] : HTN , HLD [FreeTextEntry9] : morbid obesity  [de-identified] : right foot plantar DFU 3  down to skin, subcutaneous tissue, fat, bone, serous drainage , 1st metatarsal  [de-identified] : diabetic neuropathy [de-identified] : KENNY

## 2021-07-01 NOTE — PLAN
[FreeTextEntry1] : continue off loading , wound care and antibiotic therapy , HBOT would be a beneficial adjunct to help heal the DFU Spent 20 minutes for patient care and medical decision making.\par

## 2021-07-01 NOTE — ASSESSMENT
[Verbal] : Verbal [Demo] : Demo [Patient] : Patient [Good - alert, interested, motivated] : Good - alert, interested, motivated [Verbalizes knowledge/Understanding] : Verbalizes knowledge/understanding [Dressing changes] : dressing changes [Foot Care] : foot care [Skin Care] : skin care [Pressure relief] : pressure relief [Signs and symptoms of infection] : sign and symptoms of infection [Nutrition] : nutrition [How and When to Call] : how and when to call [Off-loading] : off-loading [Patient responsibility to plan of care] : patient responsibility to plan of care [Stable] : stable [Home] : Home [Cane] : Cane [Not Applicable - Long Term Care/Home Health Agency] : Long Term Care/Home Health Agency: Not Applicable [] : Yes [FreeTextEntry2] : Infection Prevention\par Localized Wound Care\par Offloading / Pressure Relief\par Promote Optimal Skin Integrity\par Maintain acceptable pain level with use of pharmacological and nonpharmacological interventions \par ID consult\par F/U 1 week  [FreeTextEntry4] : Patient is receiving IV ABT TID for 6 weeks. Applied picc line 6/25/21 in [facility].\par DPM feelas Pt would benefit from HBOT.\par Submitted for Auth for HBOT for  DG3U.\par Pt signed HBOT Consent, HBOT Pre- Procedure Check list, Pt declined HBOT video presentation.\par HBOT 2.0 Order completed.\par Pt given a script for CXR & BW. Pt was unable to complete today. Pt states she will complete soon.\par Pt to F/U to [facility] in 1 Week or sooner for HBOT.\par Pt to F/U to [facility] in 1 Week

## 2021-07-02 ENCOUNTER — NON-APPOINTMENT (OUTPATIENT)
Age: 71
End: 2021-07-02

## 2021-07-02 DIAGNOSIS — L84 CORNS AND CALLOSITIES: ICD-10-CM

## 2021-07-02 DIAGNOSIS — E11.69 TYPE 2 DIABETES MELLITUS WITH OTHER SPECIFIED COMPLICATION: ICD-10-CM

## 2021-07-02 DIAGNOSIS — E11.621 TYPE 2 DIABETES MELLITUS WITH FOOT ULCER: ICD-10-CM

## 2021-07-02 DIAGNOSIS — M86.671 OTHER CHRONIC OSTEOMYELITIS, RIGHT ANKLE AND FOOT: ICD-10-CM

## 2021-07-02 DIAGNOSIS — E11.40 TYPE 2 DIABETES MELLITUS WITH DIABETIC NEUROPATHY, UNSPECIFIED: ICD-10-CM

## 2021-07-02 DIAGNOSIS — Z20.822 CONTACT WITH AND (SUSPECTED) EXPOSURE TO COVID-19: ICD-10-CM

## 2021-07-02 DIAGNOSIS — Z82.5 FAMILY HISTORY OF ASTHMA AND OTHER CHRONIC LOWER RESPIRATORY DISEASES: ICD-10-CM

## 2021-07-02 DIAGNOSIS — Z83.3 FAMILY HISTORY OF DIABETES MELLITUS: ICD-10-CM

## 2021-07-02 DIAGNOSIS — L97.416 NON-PRESSURE CHRONIC ULCER OF RIGHT HEEL AND MIDFOOT WITH BONE INVOLVEMENT WITHOUT EVIDENCE OF NECROSIS: ICD-10-CM

## 2021-07-02 DIAGNOSIS — E66.01 MORBID (SEVERE) OBESITY DUE TO EXCESS CALORIES: ICD-10-CM

## 2021-07-02 DIAGNOSIS — Z79.4 LONG TERM (CURRENT) USE OF INSULIN: ICD-10-CM

## 2021-07-02 DIAGNOSIS — Z98.49 CATARACT EXTRACTION STATUS, UNSPECIFIED EYE: ICD-10-CM

## 2021-07-02 DIAGNOSIS — Z79.82 LONG TERM (CURRENT) USE OF ASPIRIN: ICD-10-CM

## 2021-07-02 DIAGNOSIS — Z79.899 OTHER LONG TERM (CURRENT) DRUG THERAPY: ICD-10-CM

## 2021-07-02 DIAGNOSIS — Z98.890 OTHER SPECIFIED POSTPROCEDURAL STATES: ICD-10-CM

## 2021-07-02 DIAGNOSIS — Z88.0 ALLERGY STATUS TO PENICILLIN: ICD-10-CM

## 2021-07-02 DIAGNOSIS — I10 ESSENTIAL (PRIMARY) HYPERTENSION: ICD-10-CM

## 2021-07-02 PROCEDURE — 86140 C-REACTIVE PROTEIN: CPT

## 2021-07-02 PROCEDURE — 71046 X-RAY EXAM CHEST 2 VIEWS: CPT

## 2021-07-02 PROCEDURE — G0463: CPT

## 2021-07-02 PROCEDURE — 85652 RBC SED RATE AUTOMATED: CPT

## 2021-07-02 PROCEDURE — 36415 COLL VENOUS BLD VENIPUNCTURE: CPT

## 2021-07-02 PROCEDURE — 80053 COMPREHEN METABOLIC PANEL: CPT

## 2021-07-02 PROCEDURE — 85025 COMPLETE CBC W/AUTO DIFF WBC: CPT

## 2021-07-02 PROCEDURE — 84134 ASSAY OF PREALBUMIN: CPT

## 2021-07-02 PROCEDURE — 83036 HEMOGLOBIN GLYCOSYLATED A1C: CPT

## 2021-07-02 PROCEDURE — 71046 X-RAY EXAM CHEST 2 VIEWS: CPT | Mod: 26

## 2021-07-07 ENCOUNTER — OUTPATIENT (OUTPATIENT)
Dept: OUTPATIENT SERVICES | Facility: HOSPITAL | Age: 71
LOS: 1 days | Discharge: HOME | End: 2021-07-07
Payer: MEDICARE

## 2021-07-07 ENCOUNTER — APPOINTMENT (OUTPATIENT)
Dept: HYPERBARIC MEDICINE | Facility: HOSPITAL | Age: 71
End: 2021-07-07
Payer: MEDICARE

## 2021-07-07 VITALS
TEMPERATURE: 97 F | RESPIRATION RATE: 20 BRPM | SYSTOLIC BLOOD PRESSURE: 147 MMHG | DIASTOLIC BLOOD PRESSURE: 70 MMHG | HEART RATE: 65 BPM | OXYGEN SATURATION: 96 %

## 2021-07-07 VITALS
TEMPERATURE: 96.9 F | OXYGEN SATURATION: 95 % | SYSTOLIC BLOOD PRESSURE: 135 MMHG | DIASTOLIC BLOOD PRESSURE: 66 MMHG | HEART RATE: 70 BPM | RESPIRATION RATE: 20 BRPM

## 2021-07-07 DIAGNOSIS — Z86.69 PERSONAL HISTORY OF OTHER DISEASES OF THE NERVOUS SYSTEM AND SENSE ORGANS: Chronic | ICD-10-CM

## 2021-07-07 DIAGNOSIS — Z98.890 OTHER SPECIFIED POSTPROCEDURAL STATES: Chronic | ICD-10-CM

## 2021-07-07 DIAGNOSIS — M75.00 ADHESIVE CAPSULITIS OF UNSPECIFIED SHOULDER: Chronic | ICD-10-CM

## 2021-07-07 DIAGNOSIS — E11.621 TYPE 2 DIABETES MELLITUS WITH FOOT ULCER: ICD-10-CM

## 2021-07-07 DIAGNOSIS — S02.91XA UNSPECIFIED FRACTURE OF SKULL, INITIAL ENCOUNTER FOR CLOSED FRACTURE: Chronic | ICD-10-CM

## 2021-07-07 PROCEDURE — G0277: CPT

## 2021-07-07 PROCEDURE — 82962 GLUCOSE BLOOD TEST: CPT

## 2021-07-07 PROCEDURE — 99183 HYPERBARIC OXYGEN THERAPY: CPT

## 2021-07-07 NOTE — PROCEDURE
[Outpatient] : Outpatient [Wheelchair] : wheelchair [THIS CHAMBER HAS BEEN CLEANED / DISINFECTED] : This chamber has been cleaned / disinfected according to local and hospital policy and procedure prior to this treatment. [Patient demonstrated and verbalized proper technique for using air break mask] : Patient demonstrated and verbalized proper technique for using air break mask [Patient educated on the risks of SMOKING prior to HBOT with understanding] : Patient educated on the risks of SMOKING prior to HBOT with understanding [Patient educated on the risks of CONSUMING ALCOHOL prior to HBOT with understanding] : Patient educated on the risks of CONSUMING ALCOHOL prior to HBOT with understanding [100% Cotton] : 100% cotton [Empty all pockets] : empty all pockets [No hair oils, wigs, hairpieces, pins] : no hair oils, wigs, hairpieces, pins  [Pre tx medications] : pre tx medications  [No make-up, creams] : no make-up, creams  [No jewelry] : no jewelry  [No matches, cigarettes, lighters] : no matches, cigarettes, lighters  [Hearing aid removed] : hearing aid removed [Dentures removed] : dentures removed [Ground bracelet on pt's wrist] : ground bracelet on pt's wrist  [Contacts removed] : contacts removed  [Remove nail polish] : remove nail polish  [No reading material] : no reading material  [Bra, undergarments removed] : bra, undergarments removed  [No contraindicated dressings] : no contraindicated dressings [Ground Wire - VISUAL Verification - Intact/Free of Obstruction] : Ground Wire - VISUAL Verification - Intact/Free of Obstruction  [Ground Continuity - Verified < 1 ohm w/ Wrist Strap Channing] : Ground Continuity - Verified < 1 ohm w/ Wrist Strap Channing [Number: ___] : Number: [unfilled] [Diagnosis: ___] : Diagnosis: [unfilled] [____] : Post-Dive: Time - [unfilled] [___] : Post-Dive: Value - [unfilled] mg/dL [Clear all fields] : clear all fields [] : No [FreeTextEntry3] : 90 [FreeTextEntry5] : 12 : 09 [FreeTextEntry7] : 12 : 18 [FreeTextEntry9] : 13 : 48 [de-identified] : 13 : 57 [de-identified] : 108 minutes [de-identified] : JOE, 7/7/2021

## 2021-07-07 NOTE — ADDENDUM
[FreeTextEntry1] : The pt. presented to [facility] minimally ambulatory in wheelchair and A&Ox3 for scheduled HBOT.\par The pt's pre-dive screening was found to be within acceptable limits to begin HBOT.\par The pt. received wound care bandage change x Nurse prior to HBOT. \par The pt. descended @ 1.75 PSI/min. to Rx'd HBOT tx. depth of 2.0 EULALIO in chamber # 4 without incident.\par The pt. was observed with visible chest motion and without incident for the duration of HBOT.\par PT ASCENDED FROM TX DEPTH WITHOUT INCIDENT IN CHAMBER #4\par PTS PICC LINE WAS MEASURED BY RN POST HBOT \par PT TOLERATED TX WELL \par \par \par The pt. provided her COVID-19 VACCINATION card to [facility] staff post-HBOT. \par Same was photocopied for scanning into [facility]. \par The pt. received initial HBOT TM assessment x AMELIA Lovell post-tx.\par \par

## 2021-07-08 ENCOUNTER — APPOINTMENT (OUTPATIENT)
Dept: HYPERBARIC MEDICINE | Facility: HOSPITAL | Age: 71
End: 2021-07-08
Payer: MEDICARE

## 2021-07-08 ENCOUNTER — OUTPATIENT (OUTPATIENT)
Dept: OUTPATIENT SERVICES | Facility: HOSPITAL | Age: 71
LOS: 1 days | Discharge: HOME | End: 2021-07-08
Payer: MEDICARE

## 2021-07-08 VITALS
TEMPERATURE: 97 F | DIASTOLIC BLOOD PRESSURE: 75 MMHG | OXYGEN SATURATION: 97 % | RESPIRATION RATE: 20 BRPM | SYSTOLIC BLOOD PRESSURE: 153 MMHG | HEART RATE: 66 BPM

## 2021-07-08 VITALS
DIASTOLIC BLOOD PRESSURE: 79 MMHG | OXYGEN SATURATION: 97 % | RESPIRATION RATE: 18 BRPM | TEMPERATURE: 97.1 F | HEART RATE: 69 BPM | SYSTOLIC BLOOD PRESSURE: 172 MMHG

## 2021-07-08 DIAGNOSIS — Z98.890 OTHER SPECIFIED POSTPROCEDURAL STATES: Chronic | ICD-10-CM

## 2021-07-08 DIAGNOSIS — E11.621 TYPE 2 DIABETES MELLITUS WITH FOOT ULCER: ICD-10-CM

## 2021-07-08 DIAGNOSIS — S02.91XA UNSPECIFIED FRACTURE OF SKULL, INITIAL ENCOUNTER FOR CLOSED FRACTURE: Chronic | ICD-10-CM

## 2021-07-08 DIAGNOSIS — M75.00 ADHESIVE CAPSULITIS OF UNSPECIFIED SHOULDER: Chronic | ICD-10-CM

## 2021-07-08 DIAGNOSIS — Z79.4 LONG TERM (CURRENT) USE OF INSULIN: ICD-10-CM

## 2021-07-08 DIAGNOSIS — L97.416 NON-PRESSURE CHRONIC ULCER OF RIGHT HEEL AND MIDFOOT WITH BONE INVOLVEMENT WITHOUT EVIDENCE OF NECROSIS: ICD-10-CM

## 2021-07-08 DIAGNOSIS — Z86.69 PERSONAL HISTORY OF OTHER DISEASES OF THE NERVOUS SYSTEM AND SENSE ORGANS: Chronic | ICD-10-CM

## 2021-07-08 PROCEDURE — 99183 HYPERBARIC OXYGEN THERAPY: CPT

## 2021-07-08 PROCEDURE — 82962 GLUCOSE BLOOD TEST: CPT

## 2021-07-08 PROCEDURE — G0277: CPT

## 2021-07-09 ENCOUNTER — APPOINTMENT (OUTPATIENT)
Dept: HYPERBARIC MEDICINE | Facility: HOSPITAL | Age: 71
End: 2021-07-09
Payer: MEDICARE

## 2021-07-09 ENCOUNTER — OUTPATIENT (OUTPATIENT)
Dept: OUTPATIENT SERVICES | Facility: HOSPITAL | Age: 71
LOS: 1 days | Discharge: HOME | End: 2021-07-09
Payer: MEDICARE

## 2021-07-09 VITALS
OXYGEN SATURATION: 94 % | DIASTOLIC BLOOD PRESSURE: 79 MMHG | TEMPERATURE: 97.6 F | HEART RATE: 77 BPM | SYSTOLIC BLOOD PRESSURE: 156 MMHG | RESPIRATION RATE: 20 BRPM

## 2021-07-09 VITALS
SYSTOLIC BLOOD PRESSURE: 149 MMHG | OXYGEN SATURATION: 98 % | DIASTOLIC BLOOD PRESSURE: 68 MMHG | HEART RATE: 67 BPM | TEMPERATURE: 97.1 F | RESPIRATION RATE: 18 BRPM

## 2021-07-09 DIAGNOSIS — L97.416 NON-PRESSURE CHRONIC ULCER OF RIGHT HEEL AND MIDFOOT WITH BONE INVOLVEMENT WITHOUT EVIDENCE OF NECROSIS: ICD-10-CM

## 2021-07-09 DIAGNOSIS — E11.621 TYPE 2 DIABETES MELLITUS WITH FOOT ULCER: ICD-10-CM

## 2021-07-09 DIAGNOSIS — Z98.890 OTHER SPECIFIED POSTPROCEDURAL STATES: Chronic | ICD-10-CM

## 2021-07-09 DIAGNOSIS — M75.00 ADHESIVE CAPSULITIS OF UNSPECIFIED SHOULDER: Chronic | ICD-10-CM

## 2021-07-09 DIAGNOSIS — Z79.4 LONG TERM (CURRENT) USE OF INSULIN: ICD-10-CM

## 2021-07-09 DIAGNOSIS — S02.91XA UNSPECIFIED FRACTURE OF SKULL, INITIAL ENCOUNTER FOR CLOSED FRACTURE: Chronic | ICD-10-CM

## 2021-07-09 DIAGNOSIS — Z86.69 PERSONAL HISTORY OF OTHER DISEASES OF THE NERVOUS SYSTEM AND SENSE ORGANS: Chronic | ICD-10-CM

## 2021-07-09 PROCEDURE — 99183 HYPERBARIC OXYGEN THERAPY: CPT

## 2021-07-09 PROCEDURE — 82962 GLUCOSE BLOOD TEST: CPT

## 2021-07-09 PROCEDURE — G0277: CPT

## 2021-07-10 NOTE — ADDENDUM
[FreeTextEntry1] : PT'S PICC LINE MEASURED PRIOR TO DESCENT. \par PT DESCENDED TO 2.0 EULALIO @ 1.75 PSI/MIN WITHOUT INCIDENT IN CHAMBER # 1. PT'S RESTING AT TX DEPTH WITH CHEST RISE AND FALL OBSERVED CHAMBER SIDE. \par PT ASCENDED FROM TX DEPTH WITHOUT INCIDENT. PT TOLERATED TX WELL. \par PT'S PICC LINE MEASURED POST HBOT.

## 2021-07-10 NOTE — PROCEDURE
[Outpatient] : Outpatient [Ambulatory] : Patient is ambulatory. [Cane] : cane [Wheelchair] : wheelchair [THIS CHAMBER HAS BEEN CLEANED / DISINFECTED] : This chamber has been cleaned / disinfected according to local and hospital policy and procedure prior to this treatment. [Patient demonstrated and verbalized proper technique for using air break mask] : Patient demonstrated and verbalized proper technique for using air break mask [Patient educated on the risks of SMOKING prior to HBOT with understanding] : Patient educated on the risks of SMOKING prior to HBOT with understanding [Patient educated on the risks of CONSUMING ALCOHOL prior to HBOT with understanding] : Patient educated on the risks of CONSUMING ALCOHOL prior to HBOT with understanding [100% Cotton] : 100% cotton [Empty all pockets] : empty all pockets [No hair oils, wigs, hairpieces, pins] : no hair oils, wigs, hairpieces, pins  [Pre tx medications] : pre tx medications  [No make-up, creams] : no make-up, creams  [No jewelry] : no jewelry  [No matches, cigarettes, lighters] : no matches, cigarettes, lighters  [Hearing aid removed] : hearing aid removed [Dentures removed] : dentures removed [Ground bracelet on pt's wrist] : ground bracelet on pt's wrist  [Contacts removed] : contacts removed  [Remove nail polish] : remove nail polish  [No reading material] : no reading material  [Bra, undergarments removed] : bra, undergarments removed  [Ground Wire - VISUAL Verification - Intact/Free of Obstruction] : Ground Wire - VISUAL Verification - Intact/Free of Obstruction  [Ground Continuity - Verified < 1 ohm w/ Wrist Strap Channing] : Ground Continuity - Verified < 1 ohm w/ Wrist Strap Channing [Number: ___] : Number: [unfilled] [Diagnosis: ___] : Diagnosis: [unfilled] [____] : Post-Dive: Time - [unfilled] [___] : Post-Dive: Value - [unfilled] mg/dL [Clear all fields] : clear all fields [] : No [FreeTextEntry3] : 90 [FreeTextEntry5] : 7402 [FreeTextEntry7] : 1337 [FreeTextEntry9] : 2259 [de-identified] : 4011 [de-identified] : 108 MIN

## 2021-07-10 NOTE — ADDENDUM
[FreeTextEntry1] : PT ARRIVED TO [facility] WITH CONTRAINDICATED DRESSING\par PATIENT DRESSING CHANGE WAS DONE PRE HBOT\par PT DESCENDED TO 2.0 EULALIO @ 1.75 PSI/MIN IN CHAMBER #3\par PATIENT RESTING AT TREATMENT DEPTH WITH VISIBLE CHEST RISE AND FALL VIEWED CHAMBERSIDE.\par PT ASCENDED FROM TX DEPTH WITHOUT INCIDENT. PT TOLERATED TX WELL.

## 2021-07-10 NOTE — PROCEDURE
[Outpatient] : Outpatient [Wheelchair] : wheelchair [THIS CHAMBER HAS BEEN CLEANED / DISINFECTED] : This chamber has been cleaned / disinfected according to local and hospital policy and procedure prior to this treatment. [Patient demonstrated and verbalized proper technique for using air break mask] : Patient demonstrated and verbalized proper technique for using air break mask [Patient educated on the risks of SMOKING prior to HBOT with understanding] : Patient educated on the risks of SMOKING prior to HBOT with understanding [Patient educated on the risks of CONSUMING ALCOHOL prior to HBOT with understanding] : Patient educated on the risks of CONSUMING ALCOHOL prior to HBOT with understanding [100% Cotton] : 100% cotton [Empty all pockets] : empty all pockets [No hair oils, wigs, hairpieces, pins] : no hair oils, wigs, hairpieces, pins  [Pre tx medications] : pre tx medications  [No make-up, creams] : no make-up, creams  [No jewelry] : no jewelry  [No matches, cigarettes, lighters] : no matches, cigarettes, lighters  [Hearing aid removed] : hearing aid removed [Dentures removed] : dentures removed [Ground bracelet on pt's wrist] : ground bracelet on pt's wrist  [Contacts removed] : contacts removed  [Remove nail polish] : remove nail polish  [No reading material] : no reading material  [Bra, undergarments removed] : bra, undergarments removed  [No contraindicated dressings] : no contraindicated dressings [Ground Wire - VISUAL Verification - Intact/Free of Obstruction] : Ground Wire - VISUAL Verification - Intact/Free of Obstruction  [Ground Continuity - Verified < 1 ohm w/ Wrist Strap Channing] : Ground Continuity - Verified < 1 ohm w/ Wrist Strap Channing [Number: ___] : Number: [unfilled] [Diagnosis: ___] : Diagnosis: [unfilled] [____] : Post-Dive: Time - [unfilled] [___] : Post-Dive: Value - [unfilled] mg/dL [Clear all fields] : clear all fields [] : No [FreeTextEntry3] : 90 [FreeTextEntry5] : 2638 [FreeTextEntry7] : 1200 [FreeTextEntry9] : 1357 [de-identified] : 8869 [de-identified] : 108 MIN

## 2021-07-12 ENCOUNTER — APPOINTMENT (OUTPATIENT)
Dept: HYPERBARIC MEDICINE | Facility: HOSPITAL | Age: 71
End: 2021-07-12
Payer: MEDICARE

## 2021-07-12 ENCOUNTER — OUTPATIENT (OUTPATIENT)
Dept: OUTPATIENT SERVICES | Facility: HOSPITAL | Age: 71
LOS: 1 days | Discharge: HOME | End: 2021-07-12
Payer: MEDICARE

## 2021-07-12 VITALS
RESPIRATION RATE: 20 BRPM | DIASTOLIC BLOOD PRESSURE: 70 MMHG | HEART RATE: 69 BPM | SYSTOLIC BLOOD PRESSURE: 142 MMHG | TEMPERATURE: 98.3 F | OXYGEN SATURATION: 95 %

## 2021-07-12 VITALS
HEART RATE: 64 BPM | OXYGEN SATURATION: 96 % | DIASTOLIC BLOOD PRESSURE: 66 MMHG | RESPIRATION RATE: 20 BRPM | SYSTOLIC BLOOD PRESSURE: 160 MMHG | TEMPERATURE: 96.8 F

## 2021-07-12 DIAGNOSIS — Z98.890 OTHER SPECIFIED POSTPROCEDURAL STATES: Chronic | ICD-10-CM

## 2021-07-12 DIAGNOSIS — E11.621 TYPE 2 DIABETES MELLITUS WITH FOOT ULCER: ICD-10-CM

## 2021-07-12 DIAGNOSIS — M75.00 ADHESIVE CAPSULITIS OF UNSPECIFIED SHOULDER: Chronic | ICD-10-CM

## 2021-07-12 DIAGNOSIS — Z86.69 PERSONAL HISTORY OF OTHER DISEASES OF THE NERVOUS SYSTEM AND SENSE ORGANS: Chronic | ICD-10-CM

## 2021-07-12 DIAGNOSIS — S02.91XA UNSPECIFIED FRACTURE OF SKULL, INITIAL ENCOUNTER FOR CLOSED FRACTURE: Chronic | ICD-10-CM

## 2021-07-12 PROCEDURE — 82962 GLUCOSE BLOOD TEST: CPT

## 2021-07-12 PROCEDURE — 99183 HYPERBARIC OXYGEN THERAPY: CPT

## 2021-07-12 PROCEDURE — G0277: CPT

## 2021-07-12 NOTE — ASSESSMENT
[No change from previous assessment] : No change from previous assessment [Patient prepared for dive] : Patient prepared for dive [Patient undergoing HBO treatment for __________] : Patient undergoing HBO treatment for [unfilled] [Tolerating dive well] : Tolerating dive well [No chest pain, shortness of breath, or ear pain] :  No chest pain, shortness of breath, or ear pain  [No] : No [Clinically Stable] : Clinically stable [0] : 0 out of 10 [FreeTextEntry2] : none

## 2021-07-12 NOTE — PROCEDURE
[Outpatient] : Outpatient [Ambulatory] : Patient is ambulatory. [Wheelchair] : wheelchair [THIS CHAMBER HAS BEEN CLEANED / DISINFECTED] : This chamber has been cleaned / disinfected according to local and hospital policy and procedure prior to this treatment. [Patient demonstrated and verbalized proper technique for using air break mask] : Patient demonstrated and verbalized proper technique for using air break mask [Patient educated on the risks of SMOKING prior to HBOT with understanding] : Patient educated on the risks of SMOKING prior to HBOT with understanding [Patient educated on the risks of CONSUMING ALCOHOL prior to HBOT with understanding] : Patient educated on the risks of CONSUMING ALCOHOL prior to HBOT with understanding [100% Cotton] : 100% cotton [Empty all pockets] : empty all pockets [No hair oils, wigs, hairpieces, pins] : no hair oils, wigs, hairpieces, pins  [Pre tx medications] : pre tx medications  [No make-up, creams] : no make-up, creams  [No jewelry] : no jewelry  [No matches, cigarettes, lighters] : no matches, cigarettes, lighters  [Hearing aid removed] : hearing aid removed [Dentures removed] : dentures removed [Ground bracelet on pt's wrist] : ground bracelet on pt's wrist  [Contacts removed] : contacts removed  [Remove nail polish] : remove nail polish  [No reading material] : no reading material  [Bra, undergarments removed] : bra, undergarments removed  [No contraindicated dressings] : no contraindicated dressings [Ground Wire - VISUAL Verification - Intact/Free of Obstruction] : Ground Wire - VISUAL Verification - Intact/Free of Obstruction  [Ground Continuity - Verified < 1 ohm w/ Wrist Strap Channing] : Ground Continuity - Verified < 1 ohm w/ Wrist Strap Channing [Clear all fields] : clear all fields [Number: ___] : Number: [unfilled] [Diagnosis: ___] : Diagnosis: [unfilled] [____] : Post-Dive: Time - [unfilled] [___] : Post-Dive: Value - [unfilled] mg/dL [] : No [FreeTextEntry3] : 90 [FreeTextEntry5] : 8554 [FreeTextEntry7] : 1207 [FreeTextEntry9] : 8311 [de-identified] : 5099 [de-identified] : 108mins

## 2021-07-12 NOTE — ADDENDUM
[FreeTextEntry1] : Dressing change prior to tx. \par Pt descended to 2.0 EULALIO @ 1.75 PSI/MIN without incident in chamber #2.\par Pt resting comfortably @ depth, with equal chest rise observed throughout tx.\par Pt ascended from 2.0 EULALIO @ 1.75 PSI/MIN without incident in chamber #2.\par Pt tolerated treatment well.\par

## 2021-07-13 ENCOUNTER — APPOINTMENT (OUTPATIENT)
Dept: HYPERBARIC MEDICINE | Facility: HOSPITAL | Age: 71
End: 2021-07-13
Payer: MEDICARE

## 2021-07-13 ENCOUNTER — OUTPATIENT (OUTPATIENT)
Dept: OUTPATIENT SERVICES | Facility: HOSPITAL | Age: 71
LOS: 1 days | Discharge: HOME | End: 2021-07-13
Payer: MEDICARE

## 2021-07-13 VITALS
SYSTOLIC BLOOD PRESSURE: 156 MMHG | DIASTOLIC BLOOD PRESSURE: 73 MMHG | RESPIRATION RATE: 20 BRPM | OXYGEN SATURATION: 97 % | HEART RATE: 64 BPM | TEMPERATURE: 97 F

## 2021-07-13 VITALS
OXYGEN SATURATION: 100 % | TEMPERATURE: 97.2 F | RESPIRATION RATE: 18 BRPM | DIASTOLIC BLOOD PRESSURE: 71 MMHG | HEART RATE: 65 BPM | SYSTOLIC BLOOD PRESSURE: 160 MMHG

## 2021-07-13 DIAGNOSIS — Z79.4 LONG TERM (CURRENT) USE OF INSULIN: ICD-10-CM

## 2021-07-13 DIAGNOSIS — Z98.890 OTHER SPECIFIED POSTPROCEDURAL STATES: Chronic | ICD-10-CM

## 2021-07-13 DIAGNOSIS — E11.621 TYPE 2 DIABETES MELLITUS WITH FOOT ULCER: ICD-10-CM

## 2021-07-13 DIAGNOSIS — L97.416 NON-PRESSURE CHRONIC ULCER OF RIGHT HEEL AND MIDFOOT WITH BONE INVOLVEMENT WITHOUT EVIDENCE OF NECROSIS: ICD-10-CM

## 2021-07-13 DIAGNOSIS — S02.91XA UNSPECIFIED FRACTURE OF SKULL, INITIAL ENCOUNTER FOR CLOSED FRACTURE: Chronic | ICD-10-CM

## 2021-07-13 DIAGNOSIS — M75.00 ADHESIVE CAPSULITIS OF UNSPECIFIED SHOULDER: Chronic | ICD-10-CM

## 2021-07-13 DIAGNOSIS — Z86.69 PERSONAL HISTORY OF OTHER DISEASES OF THE NERVOUS SYSTEM AND SENSE ORGANS: Chronic | ICD-10-CM

## 2021-07-13 PROCEDURE — 99183 HYPERBARIC OXYGEN THERAPY: CPT

## 2021-07-13 PROCEDURE — 82962 GLUCOSE BLOOD TEST: CPT

## 2021-07-13 PROCEDURE — G0277: CPT

## 2021-07-14 ENCOUNTER — APPOINTMENT (OUTPATIENT)
Dept: HYPERBARIC MEDICINE | Facility: HOSPITAL | Age: 71
End: 2021-07-14
Payer: MEDICARE

## 2021-07-14 ENCOUNTER — OUTPATIENT (OUTPATIENT)
Dept: OUTPATIENT SERVICES | Facility: HOSPITAL | Age: 71
LOS: 1 days | Discharge: HOME | End: 2021-07-14
Payer: MEDICARE

## 2021-07-14 VITALS
OXYGEN SATURATION: 100 % | DIASTOLIC BLOOD PRESSURE: 76 MMHG | RESPIRATION RATE: 18 BRPM | HEART RATE: 58 BPM | TEMPERATURE: 97.6 F | SYSTOLIC BLOOD PRESSURE: 163 MMHG

## 2021-07-14 VITALS
TEMPERATURE: 97.8 F | DIASTOLIC BLOOD PRESSURE: 56 MMHG | HEART RATE: 66 BPM | OXYGEN SATURATION: 96 % | RESPIRATION RATE: 20 BRPM | SYSTOLIC BLOOD PRESSURE: 135 MMHG

## 2021-07-14 DIAGNOSIS — Z98.890 OTHER SPECIFIED POSTPROCEDURAL STATES: Chronic | ICD-10-CM

## 2021-07-14 DIAGNOSIS — S02.91XA UNSPECIFIED FRACTURE OF SKULL, INITIAL ENCOUNTER FOR CLOSED FRACTURE: Chronic | ICD-10-CM

## 2021-07-14 DIAGNOSIS — E11.621 TYPE 2 DIABETES MELLITUS WITH FOOT ULCER: ICD-10-CM

## 2021-07-14 DIAGNOSIS — Z86.69 PERSONAL HISTORY OF OTHER DISEASES OF THE NERVOUS SYSTEM AND SENSE ORGANS: Chronic | ICD-10-CM

## 2021-07-14 DIAGNOSIS — M75.00 ADHESIVE CAPSULITIS OF UNSPECIFIED SHOULDER: Chronic | ICD-10-CM

## 2021-07-14 PROCEDURE — 99183 HYPERBARIC OXYGEN THERAPY: CPT

## 2021-07-14 PROCEDURE — 82962 GLUCOSE BLOOD TEST: CPT

## 2021-07-14 PROCEDURE — G0277: CPT

## 2021-07-14 NOTE — PROCEDURE
[Outpatient] : Outpatient [Ambulatory] : Patient is ambulatory. [Wheelchair] : wheelchair [THIS CHAMBER HAS BEEN CLEANED / DISINFECTED] : This chamber has been cleaned / disinfected according to local and hospital policy and procedure prior to this treatment. [Patient demonstrated and verbalized proper technique for using air break mask] : Patient demonstrated and verbalized proper technique for using air break mask [Patient educated on the risks of SMOKING prior to HBOT with understanding] : Patient educated on the risks of SMOKING prior to HBOT with understanding [Patient educated on the risks of CONSUMING ALCOHOL prior to HBOT with understanding] : Patient educated on the risks of CONSUMING ALCOHOL prior to HBOT with understanding [100% Cotton] : 100% cotton [Empty all pockets] : empty all pockets [No hair oils, wigs, hairpieces, pins] : no hair oils, wigs, hairpieces, pins  [Pre tx medications] : pre tx medications  [No make-up, creams] : no make-up, creams  [No jewelry] : no jewelry  [No matches, cigarettes, lighters] : no matches, cigarettes, lighters  [Hearing aid removed] : hearing aid removed [Dentures removed] : dentures removed [Ground bracelet on pt's wrist] : ground bracelet on pt's wrist  [Contacts removed] : contacts removed  [Remove nail polish] : remove nail polish  [No reading material] : no reading material  [Bra, undergarments removed] : bra, undergarments removed  [No contraindicated dressings] : no contraindicated dressings [Ground Wire - VISUAL Verification - Intact/Free of Obstruction] : Ground Wire - VISUAL Verification - Intact/Free of Obstruction  [Ground Continuity - Verified < 1 ohm w/ Wrist Strap Channing] : Ground Continuity - Verified < 1 ohm w/ Wrist Strap Channing [Number: ___] : Number: [unfilled] [Diagnosis: ___] : Diagnosis: [unfilled] [____] : Post-Dive: Time - [unfilled] [___] : Post-Dive: Value - [unfilled] mg/dL [Clear all fields] : clear all fields [] : No [FreeTextEntry3] : 90 [FreeTextEntry5] : 1207 [FreeTextEntry7] : 1215 [FreeTextEntry9] : 8618 [de-identified] : 7987 [de-identified] : 108

## 2021-07-14 NOTE — ADDENDUM
[FreeTextEntry1] : Pt descended to 2.0 EULALIO @ 1.75 PSI/MIN without incident in chamber #1.\par Pt resting comfortably @ depth, with equal chest rise observed throughout tx.\par Pt ascended from 2.0 EULALIO @ 1.75 PSI/MIN without incident in chamber #1.\par Pt tolerated treatment well.\par \par

## 2021-07-15 ENCOUNTER — APPOINTMENT (OUTPATIENT)
Dept: WOUND CARE | Facility: HOSPITAL | Age: 71
End: 2021-07-15
Payer: MEDICARE

## 2021-07-15 ENCOUNTER — OUTPATIENT (OUTPATIENT)
Dept: OUTPATIENT SERVICES | Facility: HOSPITAL | Age: 71
LOS: 1 days | Discharge: HOME | End: 2021-07-15
Payer: MEDICARE

## 2021-07-15 VITALS
HEIGHT: 71 IN | RESPIRATION RATE: 18 BRPM | HEART RATE: 68 BPM | SYSTOLIC BLOOD PRESSURE: 154 MMHG | BODY MASS INDEX: 41.02 KG/M2 | WEIGHT: 293 LBS | TEMPERATURE: 97.3 F | DIASTOLIC BLOOD PRESSURE: 74 MMHG | OXYGEN SATURATION: 95 %

## 2021-07-15 DIAGNOSIS — S02.91XA UNSPECIFIED FRACTURE OF SKULL, INITIAL ENCOUNTER FOR CLOSED FRACTURE: Chronic | ICD-10-CM

## 2021-07-15 DIAGNOSIS — Z79.4 LONG TERM (CURRENT) USE OF INSULIN: ICD-10-CM

## 2021-07-15 DIAGNOSIS — L97.416 NON-PRESSURE CHRONIC ULCER OF RIGHT HEEL AND MIDFOOT WITH BONE INVOLVEMENT WITHOUT EVIDENCE OF NECROSIS: ICD-10-CM

## 2021-07-15 DIAGNOSIS — M75.00 ADHESIVE CAPSULITIS OF UNSPECIFIED SHOULDER: Chronic | ICD-10-CM

## 2021-07-15 DIAGNOSIS — Z86.69 PERSONAL HISTORY OF OTHER DISEASES OF THE NERVOUS SYSTEM AND SENSE ORGANS: Chronic | ICD-10-CM

## 2021-07-15 DIAGNOSIS — E88.9 TYPE 2 DIABETES MELLITUS WITH OTHER SPECIFIED COMPLICATION: ICD-10-CM

## 2021-07-15 DIAGNOSIS — Z98.890 OTHER SPECIFIED POSTPROCEDURAL STATES: Chronic | ICD-10-CM

## 2021-07-15 DIAGNOSIS — E11.69 TYPE 2 DIABETES MELLITUS WITH OTHER SPECIFIED COMPLICATION: ICD-10-CM

## 2021-07-15 DIAGNOSIS — E11.621 TYPE 2 DIABETES MELLITUS WITH FOOT ULCER: ICD-10-CM

## 2021-07-15 PROCEDURE — 87070 CULTURE OTHR SPECIMN AEROBIC: CPT

## 2021-07-15 PROCEDURE — 99213 OFFICE O/P EST LOW 20 MIN: CPT

## 2021-07-15 PROCEDURE — G0463: CPT

## 2021-07-15 PROCEDURE — 87077 CULTURE AEROBIC IDENTIFY: CPT

## 2021-07-15 NOTE — PLAN
[FreeTextEntry1] : continue off loading , wound care , HBOT  , patient high risk for further surgical intervention  Spent 20 minutes for patient care and medical decision making.\par

## 2021-07-15 NOTE — PHYSICAL EXAM
[4 x 4] : 4 x 4  [2+] : left 2+ [Skin Ulcer] : ulcer [Alert] : alert [Oriented to Person] : oriented to person [Oriented to Place] : oriented to place [Oriented to Time] : oriented to time [Calm] : calm [Ankle Swelling (On Exam)] : not present [Varicose Veins Of Lower Extremities] : not present [] : not present [de-identified] : A&Ox3, NAD [de-identified] : HTN, diabetic small vessel disease  [de-identified] : morbid obesity  [de-identified] : s/p right foot sesamoidectomy, 3/5 strength in all quadrants bilaterally [de-identified] : right foot plantar diabetic ulcer down to skin, subcutaneous tissue, fat, bone, DFU 3 , clean , granular probes to bone + OM on MRI  [de-identified] : Diminished light touch sensation bilaterally [FreeTextEntry1] : Plantar Foot 1 st met [FreeTextEntry2] : 0.1 [FreeTextEntry3] : 0.2 [FreeTextEntry4] : 0.7 - probes to bone [de-identified] : Callus [de-identified] : Silver Alginate  [de-identified] : Cleansed with Normal saline\par Cloth Tape \par  [TWNoteComboBox1] : Right [TWNoteComboBox4] : None [TWNoteComboBox5] : No [TWNoteComboBox6] : Surgical [de-identified] : other [de-identified] : None [de-identified] : None [de-identified] : 3x Weekly [de-identified] : Primary Dressing

## 2021-07-15 NOTE — ASSESSMENT
[Verbal] : Verbal [Patient] : Patient [Good - alert, interested, motivated] : Good - alert, interested, motivated [Verbalizes knowledge/Understanding] : Verbalizes knowledge/understanding [Dressing changes] : dressing changes [Foot Care] : foot care [Skin Care] : skin care [Signs and symptoms of infection] : sign and symptoms of infection [Nutrition] : nutrition [How and When to Call] : how and when to call [Hyperbaric Therapy] : hyperbaric therapy [Off-loading] : off-loading [Patient responsibility to plan of care] : patient responsibility to plan of care [Glycemic Control] : glycemic control [] : Yes [Stable] : stable [Home] : Home [Wheelchair] : Wheelchair [Not Applicable - Long Term Care/Home Health Agency] : Long Term Care/Home Health Agency: Not Applicable [FreeTextEntry2] : Infection Prevention\par Localized Wound Care\par Offloading / Pressure Relief\par Promote Optimal Skin Integrity\par Maintain acceptable pain level with use of pharmacological and nonpharmacological interventions\par  [FreeTextEntry4] : Pt to F/U for an assessment in Two Weeks\par Pt has completed 6/30 HBOT; no additional treatments ordered at this time \par Culture swab obtained and submitted to lab

## 2021-07-15 NOTE — REVIEW OF SYSTEMS
[Joint Stiffness] : joint stiffness [Skin Wound] : skin wound [Fever] : no fever [Eye Pain] : no eye pain [Earache] : no earache [Loss Of Hearing] : no hearing loss [Chest Pain] : no chest pain [Shortness Of Breath] : no shortness of breath [Cough] : no cough [Abdominal Pain] : no abdominal pain [Anxiety] : no anxiety [Easy Bleeding] : no tendency for easy bleeding [FreeTextEntry2] : morbid obesity [FreeTextEntry5] : HTN , HLD [FreeTextEntry9] : morbid obesity  [de-identified] : right foot plantar DFU 3  down to skin, subcutaneous tissue, fat, bone, serous drainage , 1st metatarsal  [de-identified] : diabetic neuropathy [de-identified] : KENNY

## 2021-07-15 NOTE — PROCEDURE
[Outpatient] : Outpatient [Wheelchair] : wheelchair [THIS CHAMBER HAS BEEN CLEANED / DISINFECTED] : This chamber has been cleaned / disinfected according to local and hospital policy and procedure prior to this treatment. [Patient demonstrated and verbalized proper technique for using air break mask] : Patient demonstrated and verbalized proper technique for using air break mask [Patient educated on the risks of SMOKING prior to HBOT with understanding] : Patient educated on the risks of SMOKING prior to HBOT with understanding [Patient educated on the risks of CONSUMING ALCOHOL prior to HBOT with understanding] : Patient educated on the risks of CONSUMING ALCOHOL prior to HBOT with understanding [100% Cotton] : 100% cotton [Empty all pockets] : empty all pockets [No hair oils, wigs, hairpieces, pins] : no hair oils, wigs, hairpieces, pins  [Pre tx medications] : pre tx medications  [No make-up, creams] : no make-up, creams  [No jewelry] : no jewelry  [No matches, cigarettes, lighters] : no matches, cigarettes, lighters  [Hearing aid removed] : hearing aid removed [Dentures removed] : dentures removed [Ground bracelet on pt's wrist] : ground bracelet on pt's wrist  [Contacts removed] : contacts removed  [Remove nail polish] : remove nail polish  [No reading material] : no reading material  [Bra, undergarments removed] : bra, undergarments removed  [No contraindicated dressings] : no contraindicated dressings [Ground Wire - VISUAL Verification - Intact/Free of Obstruction] : Ground Wire - VISUAL Verification - Intact/Free of Obstruction  [Ground Continuity - Verified < 1 ohm w/ Wrist Strap Channing] : Ground Continuity - Verified < 1 ohm w/ Wrist Strap Channing [Number: ___] : Number: [unfilled] [Diagnosis: ___] : Diagnosis: [unfilled] [____] : Post-Dive: Time - [unfilled] [___] : Post-Dive: Value - [unfilled] mg/dL [Clear all fields] : clear all fields [] : No [FreeTextEntry3] : 90 [FreeTextEntry5] : 5890 [FreeTextEntry7] : 5831 [FreeTextEntry9] : 9859 [de-identified] : 9708 [de-identified] : 108 MIN

## 2021-07-15 NOTE — ADDENDUM
[FreeTextEntry1] : PT'S PICC LINE MEASURED PRIOR TO DESCENT .\par PT DESCENDED TO 2.0 EULALIO @ 1.75 PSI/MIN WITHOUT INCIDENT IN CHAMBER # 1. PT'S RESTING AT TX DEPTH WITH LEGS ELEVATED. CHEST RISE AND FALL OBSERVED CHAMBER SIDE.\par PT ASCENDED FROM TX DEPTH WITHOUT INCIDENT. PT TOLERATED TX WELL. \par PT'S PICC LINE MEASURED POST HBOT. PT MADE AWARE OF ASSESSMENT TOMORROW, 7/15/21. WITH APPROPRIATE ARRIVAL TIME

## 2021-07-15 NOTE — VITALS
[] : No [de-identified] : Pt rates pain 0/10.  Patient denies any pain or discomfort at the present

## 2021-07-16 ENCOUNTER — APPOINTMENT (OUTPATIENT)
Dept: HYPERBARIC MEDICINE | Facility: HOSPITAL | Age: 71
End: 2021-07-16
Payer: MEDICARE

## 2021-07-16 ENCOUNTER — OUTPATIENT (OUTPATIENT)
Dept: OUTPATIENT SERVICES | Facility: HOSPITAL | Age: 71
LOS: 1 days | Discharge: HOME | End: 2021-07-16
Payer: MEDICARE

## 2021-07-16 VITALS
SYSTOLIC BLOOD PRESSURE: 156 MMHG | HEART RATE: 52 BPM | DIASTOLIC BLOOD PRESSURE: 69 MMHG | RESPIRATION RATE: 18 BRPM | TEMPERATURE: 97.5 F | OXYGEN SATURATION: 98 %

## 2021-07-16 VITALS
TEMPERATURE: 97.4 F | SYSTOLIC BLOOD PRESSURE: 155 MMHG | OXYGEN SATURATION: 95 % | HEART RATE: 70 BPM | RESPIRATION RATE: 20 BRPM | DIASTOLIC BLOOD PRESSURE: 72 MMHG

## 2021-07-16 DIAGNOSIS — S02.91XA UNSPECIFIED FRACTURE OF SKULL, INITIAL ENCOUNTER FOR CLOSED FRACTURE: Chronic | ICD-10-CM

## 2021-07-16 DIAGNOSIS — L97.416 NON-PRESSURE CHRONIC ULCER OF RIGHT HEEL AND MIDFOOT WITH BONE INVOLVEMENT WITHOUT EVIDENCE OF NECROSIS: ICD-10-CM

## 2021-07-16 DIAGNOSIS — Z79.82 LONG TERM (CURRENT) USE OF ASPIRIN: ICD-10-CM

## 2021-07-16 DIAGNOSIS — Z86.69 PERSONAL HISTORY OF OTHER DISEASES OF THE NERVOUS SYSTEM AND SENSE ORGANS: Chronic | ICD-10-CM

## 2021-07-16 DIAGNOSIS — Z98.49 CATARACT EXTRACTION STATUS, UNSPECIFIED EYE: ICD-10-CM

## 2021-07-16 DIAGNOSIS — L84 CORNS AND CALLOSITIES: ICD-10-CM

## 2021-07-16 DIAGNOSIS — Z82.5 FAMILY HISTORY OF ASTHMA AND OTHER CHRONIC LOWER RESPIRATORY DISEASES: ICD-10-CM

## 2021-07-16 DIAGNOSIS — M75.00 ADHESIVE CAPSULITIS OF UNSPECIFIED SHOULDER: Chronic | ICD-10-CM

## 2021-07-16 DIAGNOSIS — Z98.890 OTHER SPECIFIED POSTPROCEDURAL STATES: Chronic | ICD-10-CM

## 2021-07-16 DIAGNOSIS — Z79.899 OTHER LONG TERM (CURRENT) DRUG THERAPY: ICD-10-CM

## 2021-07-16 DIAGNOSIS — Z83.3 FAMILY HISTORY OF DIABETES MELLITUS: ICD-10-CM

## 2021-07-16 DIAGNOSIS — E11.621 TYPE 2 DIABETES MELLITUS WITH FOOT ULCER: ICD-10-CM

## 2021-07-16 DIAGNOSIS — M86.671 OTHER CHRONIC OSTEOMYELITIS, RIGHT ANKLE AND FOOT: ICD-10-CM

## 2021-07-16 DIAGNOSIS — Z79.4 LONG TERM (CURRENT) USE OF INSULIN: ICD-10-CM

## 2021-07-16 DIAGNOSIS — E11.40 TYPE 2 DIABETES MELLITUS WITH DIABETIC NEUROPATHY, UNSPECIFIED: ICD-10-CM

## 2021-07-16 DIAGNOSIS — Z98.890 OTHER SPECIFIED POSTPROCEDURAL STATES: ICD-10-CM

## 2021-07-16 DIAGNOSIS — Z88.0 ALLERGY STATUS TO PENICILLIN: ICD-10-CM

## 2021-07-16 DIAGNOSIS — E11.69 TYPE 2 DIABETES MELLITUS WITH OTHER SPECIFIED COMPLICATION: ICD-10-CM

## 2021-07-16 PROCEDURE — G0277: CPT

## 2021-07-16 PROCEDURE — 99183 HYPERBARIC OXYGEN THERAPY: CPT

## 2021-07-16 PROCEDURE — 82962 GLUCOSE BLOOD TEST: CPT

## 2021-07-17 DIAGNOSIS — E11.621 TYPE 2 DIABETES MELLITUS WITH FOOT ULCER: ICD-10-CM

## 2021-07-17 DIAGNOSIS — L97.416 NON-PRESSURE CHRONIC ULCER OF RIGHT HEEL AND MIDFOOT WITH BONE INVOLVEMENT WITHOUT EVIDENCE OF NECROSIS: ICD-10-CM

## 2021-07-17 DIAGNOSIS — Z79.4 LONG TERM (CURRENT) USE OF INSULIN: ICD-10-CM

## 2021-07-18 DIAGNOSIS — L97.416 NON-PRESSURE CHRONIC ULCER OF RIGHT HEEL AND MIDFOOT WITH BONE INVOLVEMENT WITHOUT EVIDENCE OF NECROSIS: ICD-10-CM

## 2021-07-18 DIAGNOSIS — Z79.4 LONG TERM (CURRENT) USE OF INSULIN: ICD-10-CM

## 2021-07-18 DIAGNOSIS — E11.621 TYPE 2 DIABETES MELLITUS WITH FOOT ULCER: ICD-10-CM

## 2021-07-19 ENCOUNTER — APPOINTMENT (OUTPATIENT)
Dept: HYPERBARIC MEDICINE | Facility: HOSPITAL | Age: 71
End: 2021-07-19
Payer: MEDICARE

## 2021-07-19 ENCOUNTER — OUTPATIENT (OUTPATIENT)
Dept: OUTPATIENT SERVICES | Facility: HOSPITAL | Age: 71
LOS: 1 days | Discharge: HOME | End: 2021-07-19
Payer: MEDICARE

## 2021-07-19 VITALS
DIASTOLIC BLOOD PRESSURE: 66 MMHG | RESPIRATION RATE: 18 BRPM | TEMPERATURE: 97.1 F | HEART RATE: 53 BPM | OXYGEN SATURATION: 95 % | SYSTOLIC BLOOD PRESSURE: 146 MMHG

## 2021-07-19 VITALS
HEART RATE: 80 BPM | SYSTOLIC BLOOD PRESSURE: 167 MMHG | TEMPERATURE: 96.8 F | RESPIRATION RATE: 20 BRPM | DIASTOLIC BLOOD PRESSURE: 77 MMHG | OXYGEN SATURATION: 97 %

## 2021-07-19 DIAGNOSIS — M75.00 ADHESIVE CAPSULITIS OF UNSPECIFIED SHOULDER: Chronic | ICD-10-CM

## 2021-07-19 DIAGNOSIS — L97.416 NON-PRESSURE CHRONIC ULCER OF RIGHT HEEL AND MIDFOOT WITH BONE INVOLVEMENT WITHOUT EVIDENCE OF NECROSIS: ICD-10-CM

## 2021-07-19 DIAGNOSIS — E11.621 TYPE 2 DIABETES MELLITUS WITH FOOT ULCER: ICD-10-CM

## 2021-07-19 DIAGNOSIS — Z79.4 LONG TERM (CURRENT) USE OF INSULIN: ICD-10-CM

## 2021-07-19 DIAGNOSIS — Z98.890 OTHER SPECIFIED POSTPROCEDURAL STATES: Chronic | ICD-10-CM

## 2021-07-19 DIAGNOSIS — Z86.69 PERSONAL HISTORY OF OTHER DISEASES OF THE NERVOUS SYSTEM AND SENSE ORGANS: Chronic | ICD-10-CM

## 2021-07-19 DIAGNOSIS — S02.91XA UNSPECIFIED FRACTURE OF SKULL, INITIAL ENCOUNTER FOR CLOSED FRACTURE: Chronic | ICD-10-CM

## 2021-07-19 LAB
CULTURE RESULTS: SIGNIFICANT CHANGE UP
SPECIMEN SOURCE: SIGNIFICANT CHANGE UP

## 2021-07-19 PROCEDURE — 82962 GLUCOSE BLOOD TEST: CPT

## 2021-07-19 PROCEDURE — 99183 HYPERBARIC OXYGEN THERAPY: CPT

## 2021-07-19 PROCEDURE — G0277: CPT

## 2021-07-19 NOTE — ADDENDUM
[FreeTextEntry1] : The pt. presented to [facility] minimally ambulatory (with cane) in wheelchair and was A&Ox3 for scheduled HBOT. \par \par The pt. was observed to be wearing a contraindicated outer dressing prior to HBOT.\par Nurse performed pt's Rx'd wound care including replacement of outer dressing with chamber appropriate materials prior to HBOT.\par \par The pt's pre-dive screening was found to be within acceptable limits to begin HBOT.\par \par The pt's PICC line was measured and wrapped with chamber-appropriate textile x CHRN prior to HBOT.\par The pt. was provided B/L lower extremity off-loading/elevation wedge placed to comfort prior to HBOT.\par \par The pt. descended @ 1.75 PSI/min. to Rx'd HBOT tx. depth of 2.0 EULALIO in chamber # 2 without incident.\par The pt. was observed with visible chest motion and without incident for the duration of HBOT.\par Transfer of Observation from [facility] to [facility] @ 13:30 (with 21 min BT remaining). \par The pt. was observed with visible chest motion and without incident for the duration of remaining HBOT.\par The pt. ascended from tx. depth to surface without incident.\par The pt. tolerated HBOT without incident.\par The pt. received PICC measurement x Nurse post-HBOT.

## 2021-07-19 NOTE — PROCEDURE
[Outpatient] : Outpatient [Cane] : cane [Wheelchair] : wheelchair [THIS CHAMBER HAS BEEN CLEANED / DISINFECTED] : This chamber has been cleaned / disinfected according to local and hospital policy and procedure prior to this treatment. [Patient demonstrated and verbalized proper technique for using air break mask] : Patient demonstrated and verbalized proper technique for using air break mask [Patient educated on the risks of SMOKING prior to HBOT with understanding] : Patient educated on the risks of SMOKING prior to HBOT with understanding [Patient educated on the risks of CONSUMING ALCOHOL prior to HBOT with understanding] : Patient educated on the risks of CONSUMING ALCOHOL prior to HBOT with understanding [100% Cotton] : 100% cotton [Empty all pockets] : empty all pockets [No hair oils, wigs, hairpieces, pins] : no hair oils, wigs, hairpieces, pins  [Pre tx medications] : pre tx medications  [No make-up, creams] : no make-up, creams  [No jewelry] : no jewelry  [No matches, cigarettes, lighters] : no matches, cigarettes, lighters  [Hearing aid removed] : hearing aid removed [Dentures removed] : dentures removed [Ground bracelet on pt's wrist] : ground bracelet on pt's wrist  [Contacts removed] : contacts removed  [Remove nail polish] : remove nail polish  [No reading material] : no reading material  [Bra, undergarments removed] : bra, undergarments removed  [No contraindicated dressings] : no contraindicated dressings [Ground Wire - VISUAL Verification - Intact/Free of Obstruction] : Ground Wire - VISUAL Verification - Intact/Free of Obstruction  [Ground Continuity - Verified < 1 ohm w/ Wrist Strap Channing] : Ground Continuity - Verified < 1 ohm w/ Wrist Strap Channing [Number: ___] : Number: [unfilled] [Diagnosis: ___] : Diagnosis: [unfilled] [____] : Post-Dive: Time - [unfilled] [___] : Post-Dive: Value - [unfilled] mg/dL [Clear all fields] : clear all fields [] : No [FreeTextEntry3] : 90 [FreeTextEntry5] : 12 : 12 [FreeTextEntry7] : 12 : 21 [FreeTextEntry9] : 13 : 51 [de-identified] : 14 : 00 [de-identified] : 108 min.

## 2021-07-19 NOTE — ASSESSMENT
[No change from previous assessment] : No change from previous assessment [Patient prepared for dive] : Patient prepared for dive [Patient undergoing HBO treatment for __________] : Patient undergoing HBO treatment for [unfilled] [Tolerating dive well] : Tolerating dive well [No chest pain, shortness of breath, or ear pain] :  No chest pain, shortness of breath, or ear pain  [No] : No [Clinically Stable] : Clinically stable [0] : 0 out of 10 [FreeTextEntry3] : none

## 2021-07-19 NOTE — PROCEDURE
[Outpatient] : Outpatient [Ambulatory] : Patient is ambulatory. [Wheelchair] : wheelchair [THIS CHAMBER HAS BEEN CLEANED / DISINFECTED] : This chamber has been cleaned / disinfected according to local and hospital policy and procedure prior to this treatment. [Patient demonstrated and verbalized proper technique for using air break mask] : Patient demonstrated and verbalized proper technique for using air break mask [Patient educated on the risks of SMOKING prior to HBOT with understanding] : Patient educated on the risks of SMOKING prior to HBOT with understanding [Patient educated on the risks of CONSUMING ALCOHOL prior to HBOT with understanding] : Patient educated on the risks of CONSUMING ALCOHOL prior to HBOT with understanding [100% Cotton] : 100% cotton [Empty all pockets] : empty all pockets [No hair oils, wigs, hairpieces, pins] : no hair oils, wigs, hairpieces, pins  [Pre tx medications] : pre tx medications  [No make-up, creams] : no make-up, creams  [No jewelry] : no jewelry  [No matches, cigarettes, lighters] : no matches, cigarettes, lighters  [Hearing aid removed] : hearing aid removed [Dentures removed] : dentures removed [Ground bracelet on pt's wrist] : ground bracelet on pt's wrist  [Contacts removed] : contacts removed  [Remove nail polish] : remove nail polish  [No reading material] : no reading material  [Bra, undergarments removed] : bra, undergarments removed  [No contraindicated dressings] : no contraindicated dressings [Ground Wire - VISUAL Verification - Intact/Free of Obstruction] : Ground Wire - VISUAL Verification - Intact/Free of Obstruction  [Ground Continuity - Verified < 1 ohm w/ Wrist Strap Channing] : Ground Continuity - Verified < 1 ohm w/ Wrist Strap Channing [Number: ___] : Number: [unfilled] [Diagnosis: ___] : Diagnosis: [unfilled] [____] : Post-Dive: Time - [unfilled] [___] : Post-Dive: Value - [unfilled] mg/dL [Clear all fields] : clear all fields [] : No [FreeTextEntry3] : 90 [FreeTextEntry5] : 2871 [FreeTextEntry7] : 1200 [FreeTextEntry9] : 2375 [de-identified] : 2181 [de-identified] : 108mins

## 2021-07-20 ENCOUNTER — APPOINTMENT (OUTPATIENT)
Dept: HYPERBARIC MEDICINE | Facility: HOSPITAL | Age: 71
End: 2021-07-20
Payer: MEDICARE

## 2021-07-20 ENCOUNTER — OUTPATIENT (OUTPATIENT)
Dept: OUTPATIENT SERVICES | Facility: HOSPITAL | Age: 71
LOS: 1 days | Discharge: HOME | End: 2021-07-20
Payer: MEDICARE

## 2021-07-20 VITALS
HEART RATE: 60 BPM | RESPIRATION RATE: 18 BRPM | TEMPERATURE: 97.1 F | DIASTOLIC BLOOD PRESSURE: 83 MMHG | SYSTOLIC BLOOD PRESSURE: 165 MMHG | OXYGEN SATURATION: 99 %

## 2021-07-20 VITALS
TEMPERATURE: 97.2 F | OXYGEN SATURATION: 95 % | HEART RATE: 64 BPM | DIASTOLIC BLOOD PRESSURE: 62 MMHG | RESPIRATION RATE: 18 BRPM | SYSTOLIC BLOOD PRESSURE: 140 MMHG

## 2021-07-20 DIAGNOSIS — E11.621 TYPE 2 DIABETES MELLITUS WITH FOOT ULCER: ICD-10-CM

## 2021-07-20 DIAGNOSIS — Z86.69 PERSONAL HISTORY OF OTHER DISEASES OF THE NERVOUS SYSTEM AND SENSE ORGANS: Chronic | ICD-10-CM

## 2021-07-20 DIAGNOSIS — M75.00 ADHESIVE CAPSULITIS OF UNSPECIFIED SHOULDER: Chronic | ICD-10-CM

## 2021-07-20 DIAGNOSIS — Z98.890 OTHER SPECIFIED POSTPROCEDURAL STATES: Chronic | ICD-10-CM

## 2021-07-20 DIAGNOSIS — L97.416 NON-PRESSURE CHRONIC ULCER OF RIGHT HEEL AND MIDFOOT WITH BONE INVOLVEMENT WITHOUT EVIDENCE OF NECROSIS: ICD-10-CM

## 2021-07-20 DIAGNOSIS — S02.91XA UNSPECIFIED FRACTURE OF SKULL, INITIAL ENCOUNTER FOR CLOSED FRACTURE: Chronic | ICD-10-CM

## 2021-07-20 DIAGNOSIS — Z79.4 LONG TERM (CURRENT) USE OF INSULIN: ICD-10-CM

## 2021-07-20 PROCEDURE — 99183 HYPERBARIC OXYGEN THERAPY: CPT

## 2021-07-20 PROCEDURE — G0277: CPT

## 2021-07-20 PROCEDURE — 82962 GLUCOSE BLOOD TEST: CPT

## 2021-07-20 NOTE — ADDENDUM
[FreeTextEntry1] : The pt. presented to [facility] in wheelchair with cane and A&Ox3 for scheduled HBOT.\par The pt's pre-dive screening was found to be within acceptable limits to begin HBOT.\par The pt's PICC line was measured and secured x Nurse prior to HBOT.\par The pt. descended @ 1.75 PSI/min. to Rx'd HBOT tx. depth of 2.0 EULALIO in chamber # 2 without incident.\par The pt. was observed with visible chest motion and without incident for the duration of HBOT.\par The pt. ascended from tx. depth to surface without incident.\par The pt. tolerated HBOT without incident.\par The pt's PICC line was measured x RN post-HBOT.

## 2021-07-20 NOTE — PROCEDURE
[Outpatient] : Outpatient [Cane] : cane [Wheelchair] : wheelchair [THIS CHAMBER HAS BEEN CLEANED / DISINFECTED] : This chamber has been cleaned / disinfected according to local and hospital policy and procedure prior to this treatment. [Patient demonstrated and verbalized proper technique for using air break mask] : Patient demonstrated and verbalized proper technique for using air break mask [Patient educated on the risks of SMOKING prior to HBOT with understanding] : Patient educated on the risks of SMOKING prior to HBOT with understanding [Patient educated on the risks of CONSUMING ALCOHOL prior to HBOT with understanding] : Patient educated on the risks of CONSUMING ALCOHOL prior to HBOT with understanding [100% Cotton] : 100% cotton [Empty all pockets] : empty all pockets [Pre tx medications] : pre tx medications  [No hair oils, wigs, hairpieces, pins] : no hair oils, wigs, hairpieces, pins  [No make-up, creams] : no make-up, creams  [No jewelry] : no jewelry  [No matches, cigarettes, lighters] : no matches, cigarettes, lighters  [Hearing aid removed] : hearing aid removed [Dentures removed] : dentures removed [Ground bracelet on pt's wrist] : ground bracelet on pt's wrist  [Contacts removed] : contacts removed  [Remove nail polish] : remove nail polish  [No reading material] : no reading material  [Bra, undergarments removed] : bra, undergarments removed  [No contraindicated dressings] : no contraindicated dressings [Ground Wire - VISUAL Verification - Intact/Free of Obstruction] : Ground Wire - VISUAL Verification - Intact/Free of Obstruction  [Ground Continuity - Verified < 1 ohm w/ Wrist Strap Channing] : Ground Continuity - Verified < 1 ohm w/ Wrist Strap Channing [Number: ___] : Number: [unfilled] [Diagnosis: ___] : Diagnosis: [unfilled] [____] : Post-Dive: Time - [unfilled] [___] : Post-Dive: Value - [unfilled] mg/dL [Clear all fields] : clear all fields [] : No [FreeTextEntry3] : 90 [FreeTextEntry5] : 11 : 45 [FreeTextEntry7] : 11 : 54 [FreeTextEntry9] : 13 : 24 [de-identified] : 13 : 33 [de-identified] : 108 min.

## 2021-07-21 ENCOUNTER — APPOINTMENT (OUTPATIENT)
Dept: HYPERBARIC MEDICINE | Facility: HOSPITAL | Age: 71
End: 2021-07-21

## 2021-07-22 ENCOUNTER — APPOINTMENT (OUTPATIENT)
Dept: HYPERBARIC MEDICINE | Facility: HOSPITAL | Age: 71
End: 2021-07-22
Payer: MEDICARE

## 2021-07-22 ENCOUNTER — OUTPATIENT (OUTPATIENT)
Dept: OUTPATIENT SERVICES | Facility: HOSPITAL | Age: 71
LOS: 1 days | Discharge: OTHER INSTITUTION | End: 2021-07-22
Payer: MEDICARE

## 2021-07-22 VITALS
OXYGEN SATURATION: 96 % | SYSTOLIC BLOOD PRESSURE: 141 MMHG | TEMPERATURE: 97 F | DIASTOLIC BLOOD PRESSURE: 64 MMHG | RESPIRATION RATE: 18 BRPM | HEART RATE: 71 BPM

## 2021-07-22 VITALS
OXYGEN SATURATION: 98 % | DIASTOLIC BLOOD PRESSURE: 75 MMHG | HEART RATE: 62 BPM | RESPIRATION RATE: 20 BRPM | SYSTOLIC BLOOD PRESSURE: 164 MMHG | TEMPERATURE: 97.2 F

## 2021-07-22 DIAGNOSIS — S02.91XA UNSPECIFIED FRACTURE OF SKULL, INITIAL ENCOUNTER FOR CLOSED FRACTURE: Chronic | ICD-10-CM

## 2021-07-22 DIAGNOSIS — E11.621 TYPE 2 DIABETES MELLITUS WITH FOOT ULCER: ICD-10-CM

## 2021-07-22 DIAGNOSIS — Z79.4 LONG TERM (CURRENT) USE OF INSULIN: ICD-10-CM

## 2021-07-22 DIAGNOSIS — M75.00 ADHESIVE CAPSULITIS OF UNSPECIFIED SHOULDER: Chronic | ICD-10-CM

## 2021-07-22 DIAGNOSIS — Z98.890 OTHER SPECIFIED POSTPROCEDURAL STATES: Chronic | ICD-10-CM

## 2021-07-22 DIAGNOSIS — L97.416 NON-PRESSURE CHRONIC ULCER OF RIGHT HEEL AND MIDFOOT WITH BONE INVOLVEMENT WITHOUT EVIDENCE OF NECROSIS: ICD-10-CM

## 2021-07-22 DIAGNOSIS — Z86.69 PERSONAL HISTORY OF OTHER DISEASES OF THE NERVOUS SYSTEM AND SENSE ORGANS: Chronic | ICD-10-CM

## 2021-07-22 PROCEDURE — G0277: CPT

## 2021-07-22 PROCEDURE — 99183 HYPERBARIC OXYGEN THERAPY: CPT

## 2021-07-22 PROCEDURE — 82962 GLUCOSE BLOOD TEST: CPT

## 2021-07-22 NOTE — ADDENDUM
[FreeTextEntry1] : PT PERFORMED OWN DRESSING CHANGE PRIOR TO ARRIVING TO THE [facility]. \par PT'S PICC LINE MEASURED PRIOR TO DESCENT. \par PT'S DESCENDED TO 2.0 EULALIO @ 1.75 PSI/MIN WITHOUT INCIDENT IN CHAMBER # 1 PT'S RESTING AT TX DEPTH WITH LEGS ELEVATED. CHEST RISE AND FALL OBSERVED CHAMBER SIDE.\par PT ASCENDED FROM TX DEPTH WITHOUT INCIDENT IN CHAMBER #1 \par PT PICC LINE MEASURED POST HBOT \par PT TOLERATED TX WELL

## 2021-07-22 NOTE — PROCEDURE
[Outpatient] : Outpatient [Ambulatory] : Patient is ambulatory. [Wheelchair] : wheelchair [THIS CHAMBER HAS BEEN CLEANED / DISINFECTED] : This chamber has been cleaned / disinfected according to local and hospital policy and procedure prior to this treatment. [Patient demonstrated and verbalized proper technique for using air break mask] : Patient demonstrated and verbalized proper technique for using air break mask [Patient educated on the risks of SMOKING prior to HBOT with understanding] : Patient educated on the risks of SMOKING prior to HBOT with understanding [Patient educated on the risks of CONSUMING ALCOHOL prior to HBOT with understanding] : Patient educated on the risks of CONSUMING ALCOHOL prior to HBOT with understanding [100% Cotton] : 100% cotton [Empty all pockets] : empty all pockets [No hair oils, wigs, hairpieces, pins] : no hair oils, wigs, hairpieces, pins  [Pre tx medications] : pre tx medications  [No make-up, creams] : no make-up, creams  [No jewelry] : no jewelry  [No matches, cigarettes, lighters] : no matches, cigarettes, lighters  [Hearing aid removed] : hearing aid removed [Dentures removed] : dentures removed [Ground bracelet on pt's wrist] : ground bracelet on pt's wrist  [Contacts removed] : contacts removed  [Remove nail polish] : remove nail polish  [No reading material] : no reading material  [Bra, undergarments removed] : bra, undergarments removed  [No contraindicated dressings] : no contraindicated dressings [Ground Wire - VISUAL Verification - Intact/Free of Obstruction] : Ground Wire - VISUAL Verification - Intact/Free of Obstruction  [Ground Continuity - Verified < 1 ohm w/ Wrist Strap Channing] : Ground Continuity - Verified < 1 ohm w/ Wrist Strap Channing [Number: ___] : Number: [unfilled] [Diagnosis: ___] : Diagnosis: [unfilled] [____] : Post-Dive: Time - [unfilled] [___] : Post-Dive: Value - [unfilled] mg/dL [Clear all fields] : clear all fields [] : No [FreeTextEntry3] : 90 [FreeTextEntry5] : 8239 [FreeTextEntry7] : 4744 [FreeTextEntry9] : 8957 [de-identified] : 0411 [de-identified] : 108 MINUTES

## 2021-07-23 ENCOUNTER — OUTPATIENT (OUTPATIENT)
Dept: OUTPATIENT SERVICES | Facility: HOSPITAL | Age: 71
LOS: 1 days | Discharge: HOME | End: 2021-07-23
Payer: MEDICARE

## 2021-07-23 ENCOUNTER — APPOINTMENT (OUTPATIENT)
Dept: HYPERBARIC MEDICINE | Facility: HOSPITAL | Age: 71
End: 2021-07-23
Payer: MEDICARE

## 2021-07-23 VITALS
OXYGEN SATURATION: 96 % | SYSTOLIC BLOOD PRESSURE: 143 MMHG | TEMPERATURE: 97.1 F | RESPIRATION RATE: 18 BRPM | HEART RATE: 62 BPM | DIASTOLIC BLOOD PRESSURE: 66 MMHG

## 2021-07-23 VITALS
SYSTOLIC BLOOD PRESSURE: 135 MMHG | HEART RATE: 63 BPM | RESPIRATION RATE: 18 BRPM | OXYGEN SATURATION: 98 % | DIASTOLIC BLOOD PRESSURE: 60 MMHG

## 2021-07-23 DIAGNOSIS — L97.416 NON-PRESSURE CHRONIC ULCER OF RIGHT HEEL AND MIDFOOT WITH BONE INVOLVEMENT WITHOUT EVIDENCE OF NECROSIS: ICD-10-CM

## 2021-07-23 DIAGNOSIS — Z79.4 LONG TERM (CURRENT) USE OF INSULIN: ICD-10-CM

## 2021-07-23 DIAGNOSIS — M75.00 ADHESIVE CAPSULITIS OF UNSPECIFIED SHOULDER: Chronic | ICD-10-CM

## 2021-07-23 DIAGNOSIS — Z98.890 OTHER SPECIFIED POSTPROCEDURAL STATES: Chronic | ICD-10-CM

## 2021-07-23 DIAGNOSIS — S02.91XA UNSPECIFIED FRACTURE OF SKULL, INITIAL ENCOUNTER FOR CLOSED FRACTURE: Chronic | ICD-10-CM

## 2021-07-23 DIAGNOSIS — Z86.69 PERSONAL HISTORY OF OTHER DISEASES OF THE NERVOUS SYSTEM AND SENSE ORGANS: Chronic | ICD-10-CM

## 2021-07-23 DIAGNOSIS — E11.621 TYPE 2 DIABETES MELLITUS WITH FOOT ULCER: ICD-10-CM

## 2021-07-23 PROCEDURE — 99183 HYPERBARIC OXYGEN THERAPY: CPT

## 2021-07-23 PROCEDURE — 82962 GLUCOSE BLOOD TEST: CPT

## 2021-07-23 PROCEDURE — G0277: CPT

## 2021-07-23 NOTE — ASSESSMENT
[No change from previous assessment] : No change from previous assessment [Patient prepared for dive] : Patient prepared for dive [Patient descended without problem for 9 minutes] : Patient descended without problem for 9 minutes [No dizziness or thirst] :  No dizziness or thirst [No ear problems] : No ear problems [Vital signs stable] : Vital signs stable [No Chest Pain, shortness of breath] : No Chest Pain, shortness of breath [Tolerating dive well] : Tolerating dive well [Respiratory Rate Stable] : Respiratory Rate Stable [No chest pain, shortness of breath, or ear pain] :  No chest pain, shortness of breath, or ear pain  [Tolerated Ascent well] : Tolerated Ascent well [Vital Signs stable] : Vital Signs stable [A physician was present throughout the entire HBOT] : A physician was present throughout the entire HBOT [No] : No [Clinically Stable] : Clinically stable [Continue Treatment Plan] : Continue treatment plan [0] : 0 out of 10

## 2021-07-23 NOTE — ADDENDUM
[FreeTextEntry1] : PT ARRIVED AT [facility] A&OX3 \par PT V/S WERE WITHIN  ACCEPTABLE PARAMETERS TO START HBOT TX \par PT PICC LINE MEASURED PRIOR TO HBOT \par PT LEGS ELEVATED WITH WEDGE \par PT DESCENDED TO TX DEPTH 2.0 EULALIO @1.75 PSI/MIN \par PT'S RESTING AT TX DEPTH WITH VISIBLE CHEST RISE AND FALL AS OBSERVED CHAMBER SIDE \par PT ASCENDED FROM 2.0 EULALIO @ 1.75 PSI/MIN WITHOUT INCIDENT\par PT TOLERATED TX WELL\par PT PICC LINE MEASURED POST HBOT \par PT RECEIVED WOUND CARE POST HBOT\par

## 2021-07-23 NOTE — PROCEDURE
[Outpatient] : Outpatient [Wheelchair] : wheelchair [THIS CHAMBER HAS BEEN CLEANED / DISINFECTED] : This chamber has been cleaned / disinfected according to local and hospital policy and procedure prior to this treatment. [100% Cotton] : 100% cotton [Empty all pockets] : empty all pockets [No hair oils, wigs, hairpieces, pins] : no hair oils, wigs, hairpieces, pins  [Pre tx medications] : pre tx medications  [No make-up, creams] : no make-up, creams  [No jewelry] : no jewelry  [No matches, cigarettes, lighters] : no matches, cigarettes, lighters  [Hearing aid removed] : hearing aid removed [Dentures removed] : dentures removed [Ground bracelet on pt's wrist] : ground bracelet on pt's wrist  [Contacts removed] : contacts removed  [Remove nail polish] : remove nail polish  [No reading material] : no reading material  [Bra, undergarments removed] : bra, undergarments removed  [No contraindicated dressings] : no contraindicated dressings [Ground Wire - VISUAL Verification - Intact/Free of Obstruction] : Ground Wire - VISUAL Verification - Intact/Free of Obstruction  [Ground Continuity - Verified < 1 ohm w/ Wrist Strap Channing] : Ground Continuity - Verified < 1 ohm w/ Wrist Strap Channing [Number: ___] : Number: [unfilled] [Diagnosis: ___] : Diagnosis: [unfilled] [Patient demonstrated and verbalized proper technique for using air break mask] : Patient demonstrated and verbalized proper technique for using air break mask [Patient educated on the risks of SMOKING prior to HBOT with understanding] : Patient educated on the risks of SMOKING prior to HBOT with understanding [Patient educated on the risks of CONSUMING ALCOHOL prior to HBOT with understanding] : Patient educated on the risks of CONSUMING ALCOHOL prior to HBOT with understanding [____] : Post-Dive: Time - [unfilled] [___] : Post-Dive: Value - [unfilled] mg/dL [Clear all fields] : clear all fields [] : No [FreeTextEntry3] : 90 [[facility]] : 9305 [FreeTextEntry5] : 3330 [FreeTextEntry9] : 2643 [de-identified] : 2961 [de-identified] : 108 MINUTES

## 2021-07-26 ENCOUNTER — APPOINTMENT (OUTPATIENT)
Dept: HYPERBARIC MEDICINE | Facility: HOSPITAL | Age: 71
End: 2021-07-26
Payer: MEDICARE

## 2021-07-26 ENCOUNTER — OUTPATIENT (OUTPATIENT)
Dept: OUTPATIENT SERVICES | Facility: HOSPITAL | Age: 71
LOS: 1 days | Discharge: HOME | End: 2021-07-26
Payer: MEDICARE

## 2021-07-26 VITALS
RESPIRATION RATE: 18 BRPM | TEMPERATURE: 97.4 F | HEART RATE: 62 BPM | SYSTOLIC BLOOD PRESSURE: 154 MMHG | DIASTOLIC BLOOD PRESSURE: 72 MMHG | OXYGEN SATURATION: 98 %

## 2021-07-26 VITALS
SYSTOLIC BLOOD PRESSURE: 142 MMHG | TEMPERATURE: 97 F | OXYGEN SATURATION: 94 % | RESPIRATION RATE: 20 BRPM | HEART RATE: 68 BPM | DIASTOLIC BLOOD PRESSURE: 57 MMHG

## 2021-07-26 DIAGNOSIS — Z98.890 OTHER SPECIFIED POSTPROCEDURAL STATES: Chronic | ICD-10-CM

## 2021-07-26 DIAGNOSIS — E11.621 TYPE 2 DIABETES MELLITUS WITH FOOT ULCER: ICD-10-CM

## 2021-07-26 DIAGNOSIS — L97.416 NON-PRESSURE CHRONIC ULCER OF RIGHT HEEL AND MIDFOOT WITH BONE INVOLVEMENT WITHOUT EVIDENCE OF NECROSIS: ICD-10-CM

## 2021-07-26 DIAGNOSIS — S02.91XA UNSPECIFIED FRACTURE OF SKULL, INITIAL ENCOUNTER FOR CLOSED FRACTURE: Chronic | ICD-10-CM

## 2021-07-26 DIAGNOSIS — M75.00 ADHESIVE CAPSULITIS OF UNSPECIFIED SHOULDER: Chronic | ICD-10-CM

## 2021-07-26 DIAGNOSIS — Z86.69 PERSONAL HISTORY OF OTHER DISEASES OF THE NERVOUS SYSTEM AND SENSE ORGANS: Chronic | ICD-10-CM

## 2021-07-26 DIAGNOSIS — Z79.4 LONG TERM (CURRENT) USE OF INSULIN: ICD-10-CM

## 2021-07-26 PROCEDURE — 82962 GLUCOSE BLOOD TEST: CPT

## 2021-07-26 PROCEDURE — 99183 HYPERBARIC OXYGEN THERAPY: CPT

## 2021-07-26 PROCEDURE — G0277: CPT

## 2021-07-26 NOTE — ASSESSMENT
[No change from previous assessment] : No change from previous assessment [Patient prepared for dive] : Patient prepared for dive [Patient undergoing HBO treatment for __________] : Patient undergoing HBO treatment for [unfilled] [Tolerating dive well] : Tolerating dive well [No chest pain, shortness of breath, or ear pain] :  No chest pain, shortness of breath, or ear pain  [No] : No [Clinically Stable] : Clinically stable [0] : 0 out of 10

## 2021-07-26 NOTE — PROCEDURE
[Outpatient] : Outpatient [Ambulatory] : Patient is ambulatory. [Cane] : cane [Wheelchair] : wheelchair [THIS CHAMBER HAS BEEN CLEANED / DISINFECTED] : This chamber has been cleaned / disinfected according to local and hospital policy and procedure prior to this treatment. [Patient demonstrated and verbalized proper technique for using air break mask] : Patient demonstrated and verbalized proper technique for using air break mask [Patient educated on the risks of SMOKING prior to HBOT with understanding] : Patient educated on the risks of SMOKING prior to HBOT with understanding [Patient educated on the risks of CONSUMING ALCOHOL prior to HBOT with understanding] : Patient educated on the risks of CONSUMING ALCOHOL prior to HBOT with understanding [100% Cotton] : 100% cotton [Empty all pockets] : empty all pockets [No hair oils, wigs, hairpieces, pins] : no hair oils, wigs, hairpieces, pins  [Pre tx medications] : pre tx medications  [No make-up, creams] : no make-up, creams  [No jewelry] : no jewelry  [No matches, cigarettes, lighters] : no matches, cigarettes, lighters  [Hearing aid removed] : hearing aid removed [Dentures removed] : dentures removed [Ground bracelet on pt's wrist] : ground bracelet on pt's wrist  [Contacts removed] : contacts removed  [Remove nail polish] : remove nail polish  [No reading material] : no reading material  [Bra, undergarments removed] : bra, undergarments removed  [No contraindicated dressings] : no contraindicated dressings [Ground Wire - VISUAL Verification - Intact/Free of Obstruction] : Ground Wire - VISUAL Verification - Intact/Free of Obstruction  [Ground Continuity - Verified < 1 ohm w/ Wrist Strap Channing] : Ground Continuity - Verified < 1 ohm w/ Wrist Strap Channing [Number: ___] : Number: [unfilled] [Diagnosis: ___] : Diagnosis: [unfilled] [___] : Post-Dive: Value - [unfilled] mg/dL [Clear all fields] : clear all fields [____] : Post-Dive: Time - [unfilled] [] : No [FreeTextEntry3] : 90 [FreeTextEntry5] : 1200 [[facility] : 9573 [FreeTextEntry9] : 7910 [de-identified] : 8455 [de-identified] : 108 MINUTES

## 2021-07-26 NOTE — ADDENDUM
[FreeTextEntry1] : PT RECEIVED PICC LINE MEASUREMENT PRE HBOT\par PT DESCENDED TO 2.0 EULALIO @ 1.75 PSI/MIN WITHOUT INCIDENT IN CHAMBER #4\par PT RESTING AT TX DEPTH WITH VISIBLE CHEST RISE AND FALL OBSERVED CHAMBERSIDE \par PT ASCENDED FROM TX DEPTH WITHOUT INCIDENT IN CHAMBER #4\par PT PICC MEASURED POST HBOT\par PT RECEIVED WC POST HBOT

## 2021-07-27 ENCOUNTER — APPOINTMENT (OUTPATIENT)
Dept: HYPERBARIC MEDICINE | Facility: HOSPITAL | Age: 71
End: 2021-07-27
Payer: MEDICARE

## 2021-07-27 ENCOUNTER — OUTPATIENT (OUTPATIENT)
Dept: OUTPATIENT SERVICES | Facility: HOSPITAL | Age: 71
LOS: 1 days | Discharge: HOME | End: 2021-07-27
Payer: MEDICARE

## 2021-07-27 VITALS
SYSTOLIC BLOOD PRESSURE: 154 MMHG | OXYGEN SATURATION: 94 % | TEMPERATURE: 97 F | HEART RATE: 66 BPM | RESPIRATION RATE: 20 BRPM | DIASTOLIC BLOOD PRESSURE: 64 MMHG

## 2021-07-27 VITALS
SYSTOLIC BLOOD PRESSURE: 141 MMHG | HEART RATE: 60 BPM | RESPIRATION RATE: 18 BRPM | DIASTOLIC BLOOD PRESSURE: 51 MMHG | TEMPERATURE: 97.5 F | OXYGEN SATURATION: 99 %

## 2021-07-27 DIAGNOSIS — Z86.69 PERSONAL HISTORY OF OTHER DISEASES OF THE NERVOUS SYSTEM AND SENSE ORGANS: Chronic | ICD-10-CM

## 2021-07-27 DIAGNOSIS — Z79.4 LONG TERM (CURRENT) USE OF INSULIN: ICD-10-CM

## 2021-07-27 DIAGNOSIS — M75.00 ADHESIVE CAPSULITIS OF UNSPECIFIED SHOULDER: Chronic | ICD-10-CM

## 2021-07-27 DIAGNOSIS — E11.621 TYPE 2 DIABETES MELLITUS WITH FOOT ULCER: ICD-10-CM

## 2021-07-27 DIAGNOSIS — L97.416 NON-PRESSURE CHRONIC ULCER OF RIGHT HEEL AND MIDFOOT WITH BONE INVOLVEMENT WITHOUT EVIDENCE OF NECROSIS: ICD-10-CM

## 2021-07-27 DIAGNOSIS — S02.91XA UNSPECIFIED FRACTURE OF SKULL, INITIAL ENCOUNTER FOR CLOSED FRACTURE: Chronic | ICD-10-CM

## 2021-07-27 DIAGNOSIS — Z98.890 OTHER SPECIFIED POSTPROCEDURAL STATES: Chronic | ICD-10-CM

## 2021-07-27 PROCEDURE — 82962 GLUCOSE BLOOD TEST: CPT

## 2021-07-27 PROCEDURE — 99183 HYPERBARIC OXYGEN THERAPY: CPT

## 2021-07-27 PROCEDURE — G0277: CPT

## 2021-07-27 NOTE — ADDENDUM
[FreeTextEntry1] : PT ARRIVED AT [facility] A&OX3\par PT V/S WERE WITHIN NORMAL PARAMETER TO START HBOT \par PT PICC LINE MEASURED PRIOR TO HBOT\par PT DESCENDED TO TX DEPTH 2.0 EULALIO @1.75 PSI/MIN WITHOUT INCIDENT IN CHAMBER #2\par PT'S RESTING AT TX DEPTH WITH VISIBLE RISE AND CHEST FALL AS OBSERVED CHAMBER SIDE \par PT ASCENDED FROM TX DEPTH WITHOUT INCIDENT. PT TOLERATED TX WELL. \par PT'S PICC LINE MEASURED POST HBOT BY [facility]

## 2021-07-27 NOTE — PROCEDURE
[Outpatient] : Outpatient [Ambulatory] : Patient is ambulatory. [Wheelchair] : wheelchair [THIS CHAMBER HAS BEEN CLEANED / DISINFECTED] : This chamber has been cleaned / disinfected according to local and hospital policy and procedure prior to this treatment. [Patient demonstrated and verbalized proper technique for using air break mask] : Patient demonstrated and verbalized proper technique for using air break mask [Patient educated on the risks of SMOKING prior to HBOT with understanding] : Patient educated on the risks of SMOKING prior to HBOT with understanding [Patient educated on the risks of CONSUMING ALCOHOL prior to HBOT with understanding] : Patient educated on the risks of CONSUMING ALCOHOL prior to HBOT with understanding [100% Cotton] : 100% cotton [Empty all pockets] : empty all pockets [No hair oils, wigs, hairpieces, pins] : no hair oils, wigs, hairpieces, pins  [Pre tx medications] : pre tx medications  [No make-up, creams] : no make-up, creams  [No jewelry] : no jewelry  [No matches, cigarettes, lighters] : no matches, cigarettes, lighters  [Hearing aid removed] : hearing aid removed [Dentures removed] : dentures removed [Ground bracelet on pt's wrist] : ground bracelet on pt's wrist  [Contacts removed] : contacts removed  [Remove nail polish] : remove nail polish  [No reading material] : no reading material  [Bra, undergarments removed] : bra, undergarments removed  [No contraindicated dressings] : no contraindicated dressings [Ground Wire - VISUAL Verification - Intact/Free of Obstruction] : Ground Wire - VISUAL Verification - Intact/Free of Obstruction  [Ground Continuity - Verified < 1 ohm w/ Wrist Strap Channing] : Ground Continuity - Verified < 1 ohm w/ Wrist Strap Channing [Number: ___] : Number: [unfilled] [Diagnosis: ___] : Diagnosis: [unfilled] [____] : Post-Dive: Time - [unfilled] [___] : Post-Dive: Value - [unfilled] mg/dL [Clear all fields] : clear all fields [] : No [FreeTextEntry3] : 90 [FreeTextEntry5] : 5681 [FreeTextEntry7] : 4396 [FreeTextEntry9] : 2632 [de-identified] : 5545 [de-identified] : 108 MIN

## 2021-07-28 ENCOUNTER — OUTPATIENT (OUTPATIENT)
Dept: OUTPATIENT SERVICES | Facility: HOSPITAL | Age: 71
LOS: 1 days | Discharge: HOME | End: 2021-07-28
Payer: MEDICARE

## 2021-07-28 ENCOUNTER — APPOINTMENT (OUTPATIENT)
Dept: HYPERBARIC MEDICINE | Facility: HOSPITAL | Age: 71
End: 2021-07-28
Payer: MEDICARE

## 2021-07-28 VITALS
RESPIRATION RATE: 18 BRPM | TEMPERATURE: 97.1 F | OXYGEN SATURATION: 100 % | HEART RATE: 66 BPM | SYSTOLIC BLOOD PRESSURE: 163 MMHG | DIASTOLIC BLOOD PRESSURE: 73 MMHG

## 2021-07-28 VITALS
TEMPERATURE: 97.8 F | OXYGEN SATURATION: 96 % | SYSTOLIC BLOOD PRESSURE: 153 MMHG | DIASTOLIC BLOOD PRESSURE: 66 MMHG | RESPIRATION RATE: 18 BRPM | HEART RATE: 68 BPM

## 2021-07-28 DIAGNOSIS — M75.00 ADHESIVE CAPSULITIS OF UNSPECIFIED SHOULDER: Chronic | ICD-10-CM

## 2021-07-28 DIAGNOSIS — S02.91XA UNSPECIFIED FRACTURE OF SKULL, INITIAL ENCOUNTER FOR CLOSED FRACTURE: Chronic | ICD-10-CM

## 2021-07-28 DIAGNOSIS — Z98.890 OTHER SPECIFIED POSTPROCEDURAL STATES: Chronic | ICD-10-CM

## 2021-07-28 DIAGNOSIS — Z86.69 PERSONAL HISTORY OF OTHER DISEASES OF THE NERVOUS SYSTEM AND SENSE ORGANS: Chronic | ICD-10-CM

## 2021-07-28 DIAGNOSIS — E11.621 TYPE 2 DIABETES MELLITUS WITH FOOT ULCER: ICD-10-CM

## 2021-07-28 PROCEDURE — G0277: CPT

## 2021-07-28 PROCEDURE — 82962 GLUCOSE BLOOD TEST: CPT

## 2021-07-28 PROCEDURE — 99183 HYPERBARIC OXYGEN THERAPY: CPT

## 2021-07-28 NOTE — ADDENDUM
[FreeTextEntry1] : The pt. presented to [facility] minimally ambulatory with cane and in wheelchair for scheduled HBOT.\par The pt's pre-dive screening was found to be within acceptable limits to begin HBOT.\par The pt's PICC line was measured and secured x Nurse prior to HBOT.\par The pt. was provided B/L lower extremitty off-loading/elevation measure to comfort prior to HBOT.\par \par \par The pt's dressing change was noted to be clean, dry, and intact prior to HBOT.\par \par During PDS, the pt. informed treating CHT that she had performed WCC @ home today and would not require additional dressing change x [facility] Nurse today.\par \par During PDS, treating CHT advised the pt. that she is scheduled for physician assessment without HBOT tomorrow, Thurs. 07/29/2021. \par The pt. was advised to present to [facility] @ approx. 12:45 for physician assessment. \par The pt. acknowledged and agreed to same.\par \par The pt. descended @ 1.75 PSI/min. to Rx'd HBOT tx. depth of 2.0 EULALIO in chamber # 3 without incident.\par The pt. was observed with visible chest motion and without incident for the duration of HBOT.\par Pt ascended from 2.0 EULALIO @ 1.75 PSI/MIN without incident.\par Pt tolerated tx well\par Pt PICC line measured post HBOT

## 2021-07-28 NOTE — PROCEDURE
[Outpatient] : Outpatient [Cane] : cane [Wheelchair] : wheelchair [THIS CHAMBER HAS BEEN CLEANED / DISINFECTED] : This chamber has been cleaned / disinfected according to local and hospital policy and procedure prior to this treatment. [Patient demonstrated and verbalized proper technique for using air break mask] : Patient demonstrated and verbalized proper technique for using air break mask [Patient educated on the risks of SMOKING prior to HBOT with understanding] : Patient educated on the risks of SMOKING prior to HBOT with understanding [Patient educated on the risks of CONSUMING ALCOHOL prior to HBOT with understanding] : Patient educated on the risks of CONSUMING ALCOHOL prior to HBOT with understanding [100% Cotton] : 100% cotton [Empty all pockets] : empty all pockets [No hair oils, wigs, hairpieces, pins] : no hair oils, wigs, hairpieces, pins  [Pre tx medications] : pre tx medications  [No make-up, creams] : no make-up, creams  [No jewelry] : no jewelry  [No matches, cigarettes, lighters] : no matches, cigarettes, lighters  [Hearing aid removed] : hearing aid removed [Dentures removed] : dentures removed [Ground bracelet on pt's wrist] : ground bracelet on pt's wrist  [Contacts removed] : contacts removed  [Remove nail polish] : remove nail polish  [No reading material] : no reading material  [Bra, undergarments removed] : bra, undergarments removed  [No contraindicated dressings] : no contraindicated dressings [Ground Wire - VISUAL Verification - Intact/Free of Obstruction] : Ground Wire - VISUAL Verification - Intact/Free of Obstruction  [Ground Continuity - Verified < 1 ohm w/ Wrist Strap Channing] : Ground Continuity - Verified < 1 ohm w/ Wrist Strap Channing [Number: ___] : Number: [unfilled] [Diagnosis: ___] : Diagnosis: [unfilled] [____] : Post-Dive: Time - [unfilled] [___] : Post-Dive: Value - [unfilled] mg/dL [Clear all fields] : clear all fields [] : No [FreeTextEntry3] : 90 [FreeTextEntry5] : 12 : 17 [FreeTextEntry7] : 12 : 26 [FreeTextEntry9] : 13 : 56 [de-identified] : 14 : 05 [de-identified] : 108 MINUTES

## 2021-07-28 NOTE — ASSESSMENT
[No change from previous assessment] : No change from previous assessment [Patient prepared for dive] : Patient prepared for dive [Patient undergoing HBO treatment for __________] : Patient undergoing HBO treatment for [unfilled] [Patient descended without problem for 9 minutes] : Patient descended without problem for 9 minutes [No dizziness or thirst] :  No dizziness or thirst [No ear problems] : No ear problems [Vital signs stable] : Vital signs stable [Tolerating dive well] : Tolerating dive well [No Chest Pain, shortness of breath] : No Chest Pain, shortness of breath [Respiratory Rate Stable] : Respiratory Rate Stable [No chest pain, shortness of breath, or ear pain] :  No chest pain, shortness of breath, or ear pain  [Tolerated Ascent well] : Tolerated Ascent well [Vital Signs stable] : Vital Signs stable [A physician was present throughout the entire HBOT] : A physician was present throughout the entire HBOT [No] : No [Clinically Stable] : Clinically stable [Continue Treatment Plan] : Continue treatment plan [0] : 0 out of 10 [FreeTextEntry2] : none

## 2021-07-29 ENCOUNTER — OUTPATIENT (OUTPATIENT)
Dept: OUTPATIENT SERVICES | Facility: HOSPITAL | Age: 71
LOS: 1 days | Discharge: HOME | End: 2021-07-29
Payer: MEDICARE

## 2021-07-29 ENCOUNTER — APPOINTMENT (OUTPATIENT)
Dept: WOUND CARE | Facility: HOSPITAL | Age: 71
End: 2021-07-29
Payer: MEDICARE

## 2021-07-29 VITALS
WEIGHT: 293 LBS | RESPIRATION RATE: 20 BRPM | TEMPERATURE: 97.4 F | OXYGEN SATURATION: 95 % | DIASTOLIC BLOOD PRESSURE: 74 MMHG | SYSTOLIC BLOOD PRESSURE: 163 MMHG | BODY MASS INDEX: 41.02 KG/M2 | HEART RATE: 64 BPM | HEIGHT: 71 IN

## 2021-07-29 DIAGNOSIS — Z98.890 OTHER SPECIFIED POSTPROCEDURAL STATES: ICD-10-CM

## 2021-07-29 DIAGNOSIS — Z79.4 LONG TERM (CURRENT) USE OF INSULIN: ICD-10-CM

## 2021-07-29 DIAGNOSIS — Z79.82 LONG TERM (CURRENT) USE OF ASPIRIN: ICD-10-CM

## 2021-07-29 DIAGNOSIS — M75.00 ADHESIVE CAPSULITIS OF UNSPECIFIED SHOULDER: Chronic | ICD-10-CM

## 2021-07-29 DIAGNOSIS — E11.621 TYPE 2 DIABETES MELLITUS WITH FOOT ULCER: ICD-10-CM

## 2021-07-29 DIAGNOSIS — Z82.5 FAMILY HISTORY OF ASTHMA AND OTHER CHRONIC LOWER RESPIRATORY DISEASES: ICD-10-CM

## 2021-07-29 DIAGNOSIS — Z98.890 OTHER SPECIFIED POSTPROCEDURAL STATES: Chronic | ICD-10-CM

## 2021-07-29 DIAGNOSIS — Z83.3 FAMILY HISTORY OF DIABETES MELLITUS: ICD-10-CM

## 2021-07-29 DIAGNOSIS — Z86.69 PERSONAL HISTORY OF OTHER DISEASES OF THE NERVOUS SYSTEM AND SENSE ORGANS: Chronic | ICD-10-CM

## 2021-07-29 DIAGNOSIS — Z88.0 ALLERGY STATUS TO PENICILLIN: ICD-10-CM

## 2021-07-29 DIAGNOSIS — L97.416 NON-PRESSURE CHRONIC ULCER OF RIGHT HEEL AND MIDFOOT WITH BONE INVOLVEMENT WITHOUT EVIDENCE OF NECROSIS: ICD-10-CM

## 2021-07-29 DIAGNOSIS — E11.40 TYPE 2 DIABETES MELLITUS WITH DIABETIC NEUROPATHY, UNSPECIFIED: ICD-10-CM

## 2021-07-29 DIAGNOSIS — E66.01 MORBID (SEVERE) OBESITY DUE TO EXCESS CALORIES: ICD-10-CM

## 2021-07-29 DIAGNOSIS — Z79.899 OTHER LONG TERM (CURRENT) DRUG THERAPY: ICD-10-CM

## 2021-07-29 DIAGNOSIS — Z98.49 CATARACT EXTRACTION STATUS, UNSPECIFIED EYE: ICD-10-CM

## 2021-07-29 DIAGNOSIS — S02.91XA UNSPECIFIED FRACTURE OF SKULL, INITIAL ENCOUNTER FOR CLOSED FRACTURE: Chronic | ICD-10-CM

## 2021-07-29 PROCEDURE — G0463: CPT

## 2021-07-29 PROCEDURE — 99214 OFFICE O/P EST MOD 30 MIN: CPT

## 2021-07-29 NOTE — ASSESSMENT
[Verbal] : Verbal [Demo] : Demo [Patient] : Patient [Good - alert, interested, motivated] : Good - alert, interested, motivated [Verbalizes knowledge/Understanding] : Verbalizes knowledge/understanding [Dressing changes] : dressing changes [Foot Care] : foot care [Skin Care] : skin care [Pressure relief] : pressure relief [Signs and symptoms of infection] : sign and symptoms of infection [How and When to Call] : how and when to call [Hyperbaric Therapy] : hyperbaric therapy [Off-loading] : off-loading [Patient responsibility to plan of care] : patient responsibility to plan of care [] : Yes [Stable] : stable [Home] : Home [Wheelchair] : Wheelchair [FreeTextEntry2] : Alteration in skin integrity- promote optimal skin integrity\par  [FreeTextEntry4] : Dr Rangel/ Photos taken\par IV antibiotics in progress via PICC line- pt states she has received 5 weeks of IV antibiotics & has tentative end date for August 5th- pt requesting to see Dr Guerrero (ID) & states she hasn’t seen her since beginning of IV antibiotics via PICC line- follow up with Dr Guerrero ordered by Dr Rangel for next Tuesday, 08/03/21- request for same submitted\par Pt has completed 14/30 HBO txs- no additional txs ordered by Dr Rangel today- pt to be assessed next week for additional txs as per Dr Rangel\par Daily HBO txs continue

## 2021-07-29 NOTE — PLAN
[FreeTextEntry1] : continue wound care , off loading and HBOT , patient on picc line antibiotics  Spent 20 minutes for patient care and medical decision making.\par

## 2021-07-29 NOTE — REVIEW OF SYSTEMS
[Fever] : no fever [Eye Pain] : no eye pain [Earache] : no earache [Loss Of Hearing] : no hearing loss [Chest Pain] : no chest pain [Shortness Of Breath] : no shortness of breath [Cough] : no cough [Abdominal Pain] : no abdominal pain [Joint Stiffness] : joint stiffness [Skin Wound] : skin wound [Anxiety] : no anxiety [Easy Bleeding] : no tendency for easy bleeding [FreeTextEntry2] : morbid obesity [FreeTextEntry5] : HTN , HLD [FreeTextEntry9] : morbid obesity  [de-identified] : right foot plantar DFU 3  down to skin, subcutaneous tissue, fat, bone, serous drainage , 1st metatarsal  [de-identified] : diabetic neuropathy [de-identified] : KENNY

## 2021-07-30 ENCOUNTER — OUTPATIENT (OUTPATIENT)
Dept: OUTPATIENT SERVICES | Facility: HOSPITAL | Age: 71
LOS: 1 days | Discharge: HOME | End: 2021-07-30
Payer: MEDICARE

## 2021-07-30 ENCOUNTER — APPOINTMENT (OUTPATIENT)
Dept: HYPERBARIC MEDICINE | Facility: HOSPITAL | Age: 71
End: 2021-07-30
Payer: MEDICARE

## 2021-07-30 VITALS
TEMPERATURE: 97.2 F | DIASTOLIC BLOOD PRESSURE: 65 MMHG | RESPIRATION RATE: 18 BRPM | OXYGEN SATURATION: 97 % | SYSTOLIC BLOOD PRESSURE: 148 MMHG | HEART RATE: 65 BPM

## 2021-07-30 VITALS
DIASTOLIC BLOOD PRESSURE: 66 MMHG | HEART RATE: 64 BPM | SYSTOLIC BLOOD PRESSURE: 153 MMHG | OXYGEN SATURATION: 96 % | TEMPERATURE: 97.7 F | RESPIRATION RATE: 18 BRPM

## 2021-07-30 DIAGNOSIS — E11.621 TYPE 2 DIABETES MELLITUS WITH FOOT ULCER: ICD-10-CM

## 2021-07-30 DIAGNOSIS — L97.416 NON-PRESSURE CHRONIC ULCER OF RIGHT HEEL AND MIDFOOT WITH BONE INVOLVEMENT WITHOUT EVIDENCE OF NECROSIS: ICD-10-CM

## 2021-07-30 DIAGNOSIS — Z86.69 PERSONAL HISTORY OF OTHER DISEASES OF THE NERVOUS SYSTEM AND SENSE ORGANS: Chronic | ICD-10-CM

## 2021-07-30 DIAGNOSIS — M75.00 ADHESIVE CAPSULITIS OF UNSPECIFIED SHOULDER: Chronic | ICD-10-CM

## 2021-07-30 DIAGNOSIS — Z98.890 OTHER SPECIFIED POSTPROCEDURAL STATES: Chronic | ICD-10-CM

## 2021-07-30 DIAGNOSIS — S02.91XA UNSPECIFIED FRACTURE OF SKULL, INITIAL ENCOUNTER FOR CLOSED FRACTURE: Chronic | ICD-10-CM

## 2021-07-30 DIAGNOSIS — Z79.4 LONG TERM (CURRENT) USE OF INSULIN: ICD-10-CM

## 2021-07-30 PROCEDURE — 82962 GLUCOSE BLOOD TEST: CPT

## 2021-07-30 PROCEDURE — 99183 HYPERBARIC OXYGEN THERAPY: CPT

## 2021-07-30 PROCEDURE — G0277: CPT

## 2021-07-31 ENCOUNTER — APPOINTMENT (OUTPATIENT)
Dept: HYPERBARIC MEDICINE | Facility: HOSPITAL | Age: 71
End: 2021-07-31
Payer: MEDICARE

## 2021-07-31 ENCOUNTER — OUTPATIENT (OUTPATIENT)
Dept: OUTPATIENT SERVICES | Facility: HOSPITAL | Age: 71
LOS: 1 days | Discharge: HOME | End: 2021-07-31
Payer: MEDICARE

## 2021-07-31 VITALS
RESPIRATION RATE: 20 BRPM | TEMPERATURE: 97.1 F | DIASTOLIC BLOOD PRESSURE: 65 MMHG | HEART RATE: 77 BPM | OXYGEN SATURATION: 100 % | SYSTOLIC BLOOD PRESSURE: 119 MMHG

## 2021-07-31 VITALS
HEART RATE: 65 BPM | SYSTOLIC BLOOD PRESSURE: 121 MMHG | OXYGEN SATURATION: 97 % | RESPIRATION RATE: 20 BRPM | TEMPERATURE: 97.3 F | DIASTOLIC BLOOD PRESSURE: 86 MMHG

## 2021-07-31 DIAGNOSIS — Z98.890 OTHER SPECIFIED POSTPROCEDURAL STATES: Chronic | ICD-10-CM

## 2021-07-31 DIAGNOSIS — Z79.4 LONG TERM (CURRENT) USE OF INSULIN: ICD-10-CM

## 2021-07-31 DIAGNOSIS — Z86.69 PERSONAL HISTORY OF OTHER DISEASES OF THE NERVOUS SYSTEM AND SENSE ORGANS: Chronic | ICD-10-CM

## 2021-07-31 DIAGNOSIS — S02.91XA UNSPECIFIED FRACTURE OF SKULL, INITIAL ENCOUNTER FOR CLOSED FRACTURE: Chronic | ICD-10-CM

## 2021-07-31 DIAGNOSIS — E11.621 TYPE 2 DIABETES MELLITUS WITH FOOT ULCER: ICD-10-CM

## 2021-07-31 DIAGNOSIS — M75.00 ADHESIVE CAPSULITIS OF UNSPECIFIED SHOULDER: Chronic | ICD-10-CM

## 2021-07-31 DIAGNOSIS — L97.416 NON-PRESSURE CHRONIC ULCER OF RIGHT HEEL AND MIDFOOT WITH BONE INVOLVEMENT WITHOUT EVIDENCE OF NECROSIS: ICD-10-CM

## 2021-07-31 PROCEDURE — 82962 GLUCOSE BLOOD TEST: CPT

## 2021-07-31 PROCEDURE — G0277: CPT

## 2021-07-31 PROCEDURE — 99183 HYPERBARIC OXYGEN THERAPY: CPT

## 2021-07-31 NOTE — ADDENDUM
[FreeTextEntry1] : Pt PICC LINE WAS MEASURED BY NURSE PRE HBOT\par Pt descended to 2.0 EULALIO @ 1.75 PSI/MIN without incident in chamber #4.\par Pt resting comfortably @ depth, with equal chest rise observed throughout tx.\par Pt ascended from 2.0 EULALIO @ 1.75 PSI/MIN without incident in chamber #4.\par PT PICC LINE MEASURED BY NURSE POST HBOT \par Pt tolerated treatment well.\par

## 2021-07-31 NOTE — PROCEDURE
[Outpatient] : Outpatient [Ambulatory] : Patient is ambulatory. [Cane] : cane [Wheelchair] : wheelchair [THIS CHAMBER HAS BEEN CLEANED / DISINFECTED] : This chamber has been cleaned / disinfected according to local and hospital policy and procedure prior to this treatment. [Patient demonstrated and verbalized proper technique for using air break mask] : Patient demonstrated and verbalized proper technique for using air break mask [Patient educated on the risks of SMOKING prior to HBOT with understanding] : Patient educated on the risks of SMOKING prior to HBOT with understanding [Patient educated on the risks of CONSUMING ALCOHOL prior to HBOT with understanding] : Patient educated on the risks of CONSUMING ALCOHOL prior to HBOT with understanding [100% Cotton] : 100% cotton [Empty all pockets] : empty all pockets [No hair oils, wigs, hairpieces, pins] : no hair oils, wigs, hairpieces, pins  [Pre tx medications] : pre tx medications  [No make-up, creams] : no make-up, creams  [No jewelry] : no jewelry  [No matches, cigarettes, lighters] : no matches, cigarettes, lighters  [Hearing aid removed] : hearing aid removed [Dentures removed] : dentures removed [Ground bracelet on pt's wrist] : ground bracelet on pt's wrist  [Contacts removed] : contacts removed  [Remove nail polish] : remove nail polish  [No reading material] : no reading material  [Bra, undergarments removed] : bra, undergarments removed  [No contraindicated dressings] : no contraindicated dressings [Ground Wire - VISUAL Verification - Intact/Free of Obstruction] : Ground Wire - VISUAL Verification - Intact/Free of Obstruction  [Ground Continuity - Verified < 1 ohm w/ Wrist Strap Channing] : Ground Continuity - Verified < 1 ohm w/ Wrist Strap Channing [Clear all fields] : clear all fields [Number: ___] : Number: [unfilled] [Diagnosis: ___] : Diagnosis: [unfilled] [____] : Post-Dive: Time - [unfilled] [___] : Post-Dive: Value - [unfilled] mg/dL [] : No [FreeTextEntry3] : 90 [FreeTextEntry5] : 3987 [FreeTextEntry7] : 1000 [FreeTextEntry9] : 9152 [de-identified] : 6015 [de-identified] : 108mins

## 2021-08-02 ENCOUNTER — APPOINTMENT (OUTPATIENT)
Dept: HYPERBARIC MEDICINE | Facility: HOSPITAL | Age: 71
End: 2021-08-02

## 2021-08-03 ENCOUNTER — OUTPATIENT (OUTPATIENT)
Dept: OUTPATIENT SERVICES | Facility: HOSPITAL | Age: 71
LOS: 1 days | Discharge: HOME | End: 2021-08-03
Payer: MEDICARE

## 2021-08-03 ENCOUNTER — APPOINTMENT (OUTPATIENT)
Dept: HYPERBARIC MEDICINE | Facility: HOSPITAL | Age: 71
End: 2021-08-03
Payer: MEDICARE

## 2021-08-03 ENCOUNTER — NON-APPOINTMENT (OUTPATIENT)
Age: 71
End: 2021-08-03

## 2021-08-03 VITALS
SYSTOLIC BLOOD PRESSURE: 174 MMHG | OXYGEN SATURATION: 97 % | HEART RATE: 72 BPM | TEMPERATURE: 96.9 F | RESPIRATION RATE: 18 BRPM | DIASTOLIC BLOOD PRESSURE: 71 MMHG

## 2021-08-03 VITALS
RESPIRATION RATE: 18 BRPM | TEMPERATURE: 97.4 F | DIASTOLIC BLOOD PRESSURE: 62 MMHG | OXYGEN SATURATION: 99 % | SYSTOLIC BLOOD PRESSURE: 157 MMHG | HEART RATE: 57 BPM

## 2021-08-03 DIAGNOSIS — Z98.890 OTHER SPECIFIED POSTPROCEDURAL STATES: Chronic | ICD-10-CM

## 2021-08-03 DIAGNOSIS — S02.91XA UNSPECIFIED FRACTURE OF SKULL, INITIAL ENCOUNTER FOR CLOSED FRACTURE: Chronic | ICD-10-CM

## 2021-08-03 DIAGNOSIS — E11.621 TYPE 2 DIABETES MELLITUS WITH FOOT ULCER: ICD-10-CM

## 2021-08-03 DIAGNOSIS — Z86.69 PERSONAL HISTORY OF OTHER DISEASES OF THE NERVOUS SYSTEM AND SENSE ORGANS: Chronic | ICD-10-CM

## 2021-08-03 DIAGNOSIS — M75.00 ADHESIVE CAPSULITIS OF UNSPECIFIED SHOULDER: Chronic | ICD-10-CM

## 2021-08-03 PROCEDURE — 99183 HYPERBARIC OXYGEN THERAPY: CPT

## 2021-08-03 PROCEDURE — G0277: CPT

## 2021-08-03 PROCEDURE — 82962 GLUCOSE BLOOD TEST: CPT

## 2021-08-03 NOTE — ADDENDUM
[FreeTextEntry1] : PT ARRIVED TO [facility] A&OX3 WITH THE USE OF A CANE AND WHEELCHAIR\par PT DESCENDED TO 2.0 EULALIO @ 1.75 PSI/MIN WITHOUT INCIDENT IN CHAMBER # 1. PT'S RESTING AT TX DEPTH WITH LEGS ELEVATED. CHEST RISE AND FALL OBSERVED CHAMBER SIDE.\par PT ASCENDED FROM 2.0 EULALIO @ 1.75 PSI/MIN WITHOUT INCIDENT\par PT TOLERATED TX WELL\par PT PICC LINE MEASURED POST HBOT

## 2021-08-03 NOTE — PROCEDURE
[Outpatient] : Outpatient [Wheelchair] : wheelchair [THIS CHAMBER HAS BEEN CLEANED / DISINFECTED] : This chamber has been cleaned / disinfected according to local and hospital policy and procedure prior to this treatment. [Patient demonstrated and verbalized proper technique for using air break mask] : Patient demonstrated and verbalized proper technique for using air break mask [Patient educated on the risks of SMOKING prior to HBOT with understanding] : Patient educated on the risks of SMOKING prior to HBOT with understanding [Patient educated on the risks of CONSUMING ALCOHOL prior to HBOT with understanding] : Patient educated on the risks of CONSUMING ALCOHOL prior to HBOT with understanding [100% Cotton] : 100% cotton [Empty all pockets] : empty all pockets [No hair oils, wigs, hairpieces, pins] : no hair oils, wigs, hairpieces, pins  [Pre tx medications] : pre tx medications  [No make-up, creams] : no make-up, creams  [No jewelry] : no jewelry  [No matches, cigarettes, lighters] : no matches, cigarettes, lighters  [Hearing aid removed] : hearing aid removed [Dentures removed] : dentures removed [Ground bracelet on pt's wrist] : ground bracelet on pt's wrist  [Contacts removed] : contacts removed  [Remove nail polish] : remove nail polish  [No reading material] : no reading material  [Bra, undergarments removed] : bra, undergarments removed  [No contraindicated dressings] : no contraindicated dressings [Ground Wire - VISUAL Verification - Intact/Free of Obstruction] : Ground Wire - VISUAL Verification - Intact/Free of Obstruction  [Ground Continuity - Verified < 1 ohm w/ Wrist Strap Channing] : Ground Continuity - Verified < 1 ohm w/ Wrist Strap Channing [Number: ___] : Number: [unfilled] [Diagnosis: ___] : Diagnosis: [unfilled] [Cane] : cane [____] : Post-Dive: Time - [unfilled] [___] : Post-Dive: Value - [unfilled] mg/dL [Clear all fields] : clear all fields [] : No [FreeTextEntry3] : 90 [FreeTextEntry5] : 8460 [FreeTextEntry7] : 1153 [FreeTextEntry9] : 1525 [de-identified] : 152 [de-identified] : 108 MINUTES

## 2021-08-03 NOTE — HISTORY OF PRESENT ILLNESS
[FreeTextEntry1] : 6/1/21\par 70 year old female seen in wound center for the first time on 1/29, is here for ID consult. She has a wound in right Plantar medial 1st metatarsal area. She stepped on a needle at home since 1/8 had cellulitis and reported to [facility] on 1/12/21. She was admitted and had a right foot incision and drainage with sesamoidectomy and removal of foreign body on 1/15/21. She was discharged on 1/20/21 with a PICC Line and ceftriaxone for 6 weeks. Culture grew MSSA and strep. \par The wound is getting smaller in every visit. no pain or discharge. very small opening in the area. \par \par 6/15/21\par Patient had MRI showing signs of OM and culture is growing MSSA twice 5/27 and 6/10. \par No pain, no sign of cellulitis, no fever, has yellow discharge from small wound on plantar side of 1st metatarsal head. \par MRI: 6/4\par 1.  Susceptibility from foreign body in the plantar soft tissues.\par 2.  Abnormal marrow signal within the first metatarsal head, the first proximal phalanx, the fifth metatarsal head, and the fifth proximal phalanx. These findings are nonspecific but can be seen in the setting of infection. Correlate clinically for an overlying skin wound.\par \par 8/3/21\par She is here for follow up. Now with PICC in left arm that looks fine. On 2gm cefazolin q8h that is almost finishing the 6 weeks (in 2 days). Wound has been closed completely, no sign of infection. No fever or chills. No other concern. \par No side effect of meds. no diarrhea.

## 2021-08-04 ENCOUNTER — APPOINTMENT (OUTPATIENT)
Dept: HYPERBARIC MEDICINE | Facility: HOSPITAL | Age: 71
End: 2021-08-04
Payer: MEDICARE

## 2021-08-04 ENCOUNTER — NON-APPOINTMENT (OUTPATIENT)
Age: 71
End: 2021-08-04

## 2021-08-04 ENCOUNTER — OUTPATIENT (OUTPATIENT)
Dept: OUTPATIENT SERVICES | Facility: HOSPITAL | Age: 71
LOS: 1 days | Discharge: HOME | End: 2021-08-04
Payer: MEDICARE

## 2021-08-04 VITALS
DIASTOLIC BLOOD PRESSURE: 65 MMHG | RESPIRATION RATE: 18 BRPM | TEMPERATURE: 97.1 F | SYSTOLIC BLOOD PRESSURE: 157 MMHG | OXYGEN SATURATION: 98 % | HEART RATE: 59 BPM

## 2021-08-04 VITALS
HEART RATE: 61 BPM | TEMPERATURE: 97 F | RESPIRATION RATE: 20 BRPM | SYSTOLIC BLOOD PRESSURE: 133 MMHG | DIASTOLIC BLOOD PRESSURE: 83 MMHG | OXYGEN SATURATION: 96 %

## 2021-08-04 DIAGNOSIS — S02.91XA UNSPECIFIED FRACTURE OF SKULL, INITIAL ENCOUNTER FOR CLOSED FRACTURE: Chronic | ICD-10-CM

## 2021-08-04 DIAGNOSIS — Z98.890 OTHER SPECIFIED POSTPROCEDURAL STATES: Chronic | ICD-10-CM

## 2021-08-04 DIAGNOSIS — M75.00 ADHESIVE CAPSULITIS OF UNSPECIFIED SHOULDER: Chronic | ICD-10-CM

## 2021-08-04 DIAGNOSIS — Z79.4 LONG TERM (CURRENT) USE OF INSULIN: ICD-10-CM

## 2021-08-04 DIAGNOSIS — L97.416 NON-PRESSURE CHRONIC ULCER OF RIGHT HEEL AND MIDFOOT WITH BONE INVOLVEMENT WITHOUT EVIDENCE OF NECROSIS: ICD-10-CM

## 2021-08-04 DIAGNOSIS — E11.621 TYPE 2 DIABETES MELLITUS WITH FOOT ULCER: ICD-10-CM

## 2021-08-04 DIAGNOSIS — Z86.69 PERSONAL HISTORY OF OTHER DISEASES OF THE NERVOUS SYSTEM AND SENSE ORGANS: Chronic | ICD-10-CM

## 2021-08-04 PROCEDURE — 99183 HYPERBARIC OXYGEN THERAPY: CPT

## 2021-08-04 PROCEDURE — G0277: CPT

## 2021-08-04 PROCEDURE — 82962 GLUCOSE BLOOD TEST: CPT

## 2021-08-04 NOTE — PROCEDURE
[] : No [FreeTextEntry3] : 90 [FreeTextEntry5] : 0249 [FreeTextEneok7] : 0065 [FreeTextEntry9] : 8166 [de-identified] : 5974 [de-identified] : 108 minutes

## 2021-08-04 NOTE — PROCEDURE
[Outpatient] : Outpatient [Wheelchair] : wheelchair [THIS CHAMBER HAS BEEN CLEANED / DISINFECTED] : This chamber has been cleaned / disinfected according to local and hospital policy and procedure prior to this treatment. [Patient demonstrated and verbalized proper technique for using air break mask] : Patient demonstrated and verbalized proper technique for using air break mask [Patient educated on the risks of SMOKING prior to HBOT with understanding] : Patient educated on the risks of SMOKING prior to HBOT with understanding [Patient educated on the risks of CONSUMING ALCOHOL prior to HBOT with understanding] : Patient educated on the risks of CONSUMING ALCOHOL prior to HBOT with understanding [100% Cotton] : 100% cotton [Empty all pockets] : empty all pockets [No hair oils, wigs, hairpieces, pins] : no hair oils, wigs, hairpieces, pins  [Pre tx medications] : pre tx medications  [No make-up, creams] : no make-up, creams  [No jewelry] : no jewelry  [No matches, cigarettes, lighters] : no matches, cigarettes, lighters  [Hearing aid removed] : hearing aid removed [Dentures removed] : dentures removed [Ground bracelet on pt's wrist] : ground bracelet on pt's wrist  [Contacts removed] : contacts removed  [Remove nail polish] : remove nail polish  [No reading material] : no reading material  [Bra, undergarments removed] : bra, undergarments removed  [No contraindicated dressings] : no contraindicated dressings [Ground Wire - VISUAL Verification - Intact/Free of Obstruction] : Ground Wire - VISUAL Verification - Intact/Free of Obstruction  [Ground Continuity - Verified < 1 ohm w/ Wrist Strap Channing] : Ground Continuity - Verified < 1 ohm w/ Wrist Strap Channing [Number: ___] : Number: [unfilled] [Diagnosis: ___] : Diagnosis: [unfilled] [____] : Post-Dive: Time - [unfilled] [___] : Post-Dive: Value - [unfilled] mg/dL [Clear all fields] : clear all fields [] : No [FreeTextEntry3] : 90 MINUTES [FreeTextEntry5] : 8983 [FreeTextEntry7] : 3174 [FreeTextEntry9] : 2352 [de-identified] : 3403 [de-identified] : 108 MINUTES

## 2021-08-04 NOTE — ADDENDUM
[FreeTextEntry1] : PT ARRIVED TO [facility] A&OX3 \par PT V/S WERE WITHIN NORMAL PARAMETERS TO BEGIN HBOT \par PT PICC LINE MEASURED PRE HBOT \par PT DESCENDED TO TX DEPTH 2.0 EULALIO @1.75 PSI/MIN WITHOUT INCIDENT IN CHAMBER #2\par PT'S RESTING AT TX DEPTH WITH VISIBLE CHEST RISE AND FALL AS OBSERVED CHAMBER SIDE \par PT ASCENDED FROM TX DEPTH WITHOUT INCIDENT IN CHAMBER #2\par PTS PICC LINE WAS MEASURED BY NURSE POST HBOT \par PT TOLERATED TX WELL

## 2021-08-04 NOTE — ADDENDUM
[FreeTextEntry1] : PT ARRIVED TO [facility] A&OX3\par PT V/S WERE WITHIN NORMAL PARAMETERS TO START HBOT \par PT PICC LINE MEASURED PRIOR TO TX \par PT DESCENDED TO TX DEPTH 2.0 EULALIO @1.75 PSI/MIN WITHOUT INCIDENT IN CHAMBER #2\par PT'S RESTING AT TX DEPTH WITH VISIBLE CHEST RISE AND FALL AS OBSERVED CHAMBER SIDE \par PT ASCENDED FROM TX DEPTH WITHOUT INCIDENT IN CHAMBER #2 \par PT PICC LINE MEASURED POST HBOT \par PT SPOKE TO DR. MENDOSA POST HBOT \par PT RECEIVED WC POST HBOT\par PT TOLERATED TX WELL

## 2021-08-05 ENCOUNTER — OUTPATIENT (OUTPATIENT)
Dept: OUTPATIENT SERVICES | Facility: HOSPITAL | Age: 71
LOS: 1 days | Discharge: HOME | End: 2021-08-05
Payer: MEDICARE

## 2021-08-05 ENCOUNTER — APPOINTMENT (OUTPATIENT)
Dept: HYPERBARIC MEDICINE | Facility: HOSPITAL | Age: 71
End: 2021-08-05
Payer: MEDICARE

## 2021-08-05 VITALS
SYSTOLIC BLOOD PRESSURE: 144 MMHG | TEMPERATURE: 97 F | OXYGEN SATURATION: 96 % | DIASTOLIC BLOOD PRESSURE: 56 MMHG | RESPIRATION RATE: 18 BRPM | HEART RATE: 64 BPM

## 2021-08-05 VITALS
RESPIRATION RATE: 18 BRPM | DIASTOLIC BLOOD PRESSURE: 65 MMHG | HEART RATE: 64 BPM | OXYGEN SATURATION: 100 % | SYSTOLIC BLOOD PRESSURE: 166 MMHG | TEMPERATURE: 97.2 F

## 2021-08-05 DIAGNOSIS — M75.00 ADHESIVE CAPSULITIS OF UNSPECIFIED SHOULDER: Chronic | ICD-10-CM

## 2021-08-05 DIAGNOSIS — S02.91XA UNSPECIFIED FRACTURE OF SKULL, INITIAL ENCOUNTER FOR CLOSED FRACTURE: Chronic | ICD-10-CM

## 2021-08-05 DIAGNOSIS — Z98.890 OTHER SPECIFIED POSTPROCEDURAL STATES: Chronic | ICD-10-CM

## 2021-08-05 DIAGNOSIS — E11.621 TYPE 2 DIABETES MELLITUS WITH FOOT ULCER: ICD-10-CM

## 2021-08-05 DIAGNOSIS — L97.416 NON-PRESSURE CHRONIC ULCER OF RIGHT HEEL AND MIDFOOT WITH BONE INVOLVEMENT WITHOUT EVIDENCE OF NECROSIS: ICD-10-CM

## 2021-08-05 DIAGNOSIS — Z86.69 PERSONAL HISTORY OF OTHER DISEASES OF THE NERVOUS SYSTEM AND SENSE ORGANS: Chronic | ICD-10-CM

## 2021-08-05 DIAGNOSIS — Z79.4 LONG TERM (CURRENT) USE OF INSULIN: ICD-10-CM

## 2021-08-05 PROCEDURE — G0277: CPT

## 2021-08-05 PROCEDURE — 82962 GLUCOSE BLOOD TEST: CPT

## 2021-08-05 PROCEDURE — 99183 HYPERBARIC OXYGEN THERAPY: CPT

## 2021-08-05 NOTE — ADDENDUM
[FreeTextEntry1] : PT ARRIVED TO [facility] A&OX3 \par PT V/S WERE WITHIN PARAMETERS TO START HBOT \par PT PICC LINE MEASURED PRE HBOT \par PT DESCENDED TO TX DEPTH 2.0 EULALIO @1.75 PSI/MIN WITHOUT INCIDENT IN CHAMBER #1\par PT'S RESTING AT TX DEPTH WITH VISIBLE CHEST RISE AND FALL AS OBSERVED CHAMBER SIDE \par PT ASCENDED FROM TX DEPTH WITHOUT INCIDENT IN CHAMBER #1\par PTS PICC LINE WAS MEASURED BY NURSE POST HBOT \par PT TOLERATED TX WELL \par \par \par

## 2021-08-05 NOTE — PROCEDURE

## 2021-08-06 ENCOUNTER — APPOINTMENT (OUTPATIENT)
Dept: HYPERBARIC MEDICINE | Facility: HOSPITAL | Age: 71
End: 2021-08-06
Payer: MEDICARE

## 2021-08-06 ENCOUNTER — OUTPATIENT (OUTPATIENT)
Dept: OUTPATIENT SERVICES | Facility: HOSPITAL | Age: 71
LOS: 1 days | End: 2021-08-06
Payer: MEDICARE

## 2021-08-06 VITALS
HEART RATE: 62 BPM | OXYGEN SATURATION: 100 % | TEMPERATURE: 97.3 F | DIASTOLIC BLOOD PRESSURE: 69 MMHG | RESPIRATION RATE: 18 BRPM | SYSTOLIC BLOOD PRESSURE: 157 MMHG

## 2021-08-06 VITALS
HEART RATE: 80 BPM | SYSTOLIC BLOOD PRESSURE: 154 MMHG | RESPIRATION RATE: 20 BRPM | OXYGEN SATURATION: 95 % | TEMPERATURE: 97.4 F | DIASTOLIC BLOOD PRESSURE: 63 MMHG

## 2021-08-06 DIAGNOSIS — Z98.890 OTHER SPECIFIED POSTPROCEDURAL STATES: Chronic | ICD-10-CM

## 2021-08-06 DIAGNOSIS — M75.00 ADHESIVE CAPSULITIS OF UNSPECIFIED SHOULDER: Chronic | ICD-10-CM

## 2021-08-06 DIAGNOSIS — E11.621 TYPE 2 DIABETES MELLITUS WITH FOOT ULCER: ICD-10-CM

## 2021-08-06 DIAGNOSIS — Z86.69 PERSONAL HISTORY OF OTHER DISEASES OF THE NERVOUS SYSTEM AND SENSE ORGANS: Chronic | ICD-10-CM

## 2021-08-06 DIAGNOSIS — S02.91XA UNSPECIFIED FRACTURE OF SKULL, INITIAL ENCOUNTER FOR CLOSED FRACTURE: Chronic | ICD-10-CM

## 2021-08-06 PROCEDURE — 82962 GLUCOSE BLOOD TEST: CPT

## 2021-08-06 PROCEDURE — 99183 HYPERBARIC OXYGEN THERAPY: CPT

## 2021-08-07 NOTE — ADDENDUM
[FreeTextEntry1] : Pt arrived to [facility] A&OX3 with the use of a wheelchair\par Pt PICC line was measured and wrapped PRE HBOT\par All pt vitals were within parameters for HBOT\par Pt descended to 2.0 EULALIO @ 1.75 PSI/MIN without incident in chamber #3.\par Pt resting comfortably @ depth, with equal chest rise observed throughout tx.\par \par TRANSFER OF CARE FROM  TO \par PT ASCENDED FROM 2.0 EULALIO @ 1.75 PSI/MIN WITHOUT INCIDENT\par PT TOLERATED TX WELL\par PT PICC LINE MEASURED AND WRAPPED POST HBOT\par PT TO RECEIVE WOUND CARE POST HBOT.\par \par NOTE LOCKED PRIOR TO ENTERING POST VITALS(VITALS CAN BE VIEWED UNDER VITALS TAB ONLY) \par B/P= 157/69\par UXC1=531% \par PULSE=68 \par TEMP=97.3 \par RESP=18 CLEAR BILATERAL\par

## 2021-08-07 NOTE — PROCEDURE
[Outpatient] : Outpatient [Ambulatory] : Patient is ambulatory. [Cane] : cane [Wheelchair] : wheelchair [THIS CHAMBER HAS BEEN CLEANED / DISINFECTED] : This chamber has been cleaned / disinfected according to local and hospital policy and procedure prior to this treatment. [Patient demonstrated and verbalized proper technique for using air break mask] : Patient demonstrated and verbalized proper technique for using air break mask [Patient educated on the risks of SMOKING prior to HBOT with understanding] : Patient educated on the risks of SMOKING prior to HBOT with understanding [Patient educated on the risks of CONSUMING ALCOHOL prior to HBOT with understanding] : Patient educated on the risks of CONSUMING ALCOHOL prior to HBOT with understanding [100% Cotton] : 100% cotton [Empty all pockets] : empty all pockets [No hair oils, wigs, hairpieces, pins] : no hair oils, wigs, hairpieces, pins  [Pre tx medications] : pre tx medications  [No make-up, creams] : no make-up, creams  [No jewelry] : no jewelry  [No matches, cigarettes, lighters] : no matches, cigarettes, lighters  [Hearing aid removed] : hearing aid removed [Dentures removed] : dentures removed [Ground bracelet on pt's wrist] : ground bracelet on pt's wrist  [Contacts removed] : contacts removed  [Remove nail polish] : remove nail polish  [No reading material] : no reading material  [Bra, undergarments removed] : bra, undergarments removed  [No contraindicated dressings] : no contraindicated dressings [Ground Wire - VISUAL Verification - Intact/Free of Obstruction] : Ground Wire - VISUAL Verification - Intact/Free of Obstruction  [Ground Continuity - Verified < 1 ohm w/ Wrist Strap Channing] : Ground Continuity - Verified < 1 ohm w/ Wrist Strap Channing [Number: ___] : Number: [unfilled] [Diagnosis: ___] : Diagnosis: [unfilled] [____] : Post-Dive: Time - [unfilled] [___] : Post-Dive: Value - [unfilled] mg/dL [Clear all fields] : clear all fields [] : No [FreeTextEntry3] : 90 [FreeTextEntry5] : 0108 [[facility]] : 4119 [FreeTextEntry9] : 9082 [de-identified] : 6556 [de-identified] : 108 MINUTES

## 2021-08-09 ENCOUNTER — OUTPATIENT (OUTPATIENT)
Dept: OUTPATIENT SERVICES | Facility: HOSPITAL | Age: 71
LOS: 1 days | Discharge: HOME | End: 2021-08-09
Payer: MEDICARE

## 2021-08-09 ENCOUNTER — APPOINTMENT (OUTPATIENT)
Dept: HYPERBARIC MEDICINE | Facility: HOSPITAL | Age: 71
End: 2021-08-09
Payer: MEDICARE

## 2021-08-09 VITALS
RESPIRATION RATE: 18 BRPM | DIASTOLIC BLOOD PRESSURE: 68 MMHG | TEMPERATURE: 97 F | HEART RATE: 58 BPM | OXYGEN SATURATION: 100 % | SYSTOLIC BLOOD PRESSURE: 165 MMHG

## 2021-08-09 VITALS
TEMPERATURE: 97.1 F | HEART RATE: 66 BPM | OXYGEN SATURATION: 95 % | SYSTOLIC BLOOD PRESSURE: 144 MMHG | RESPIRATION RATE: 18 BRPM | DIASTOLIC BLOOD PRESSURE: 70 MMHG

## 2021-08-09 DIAGNOSIS — M75.00 ADHESIVE CAPSULITIS OF UNSPECIFIED SHOULDER: Chronic | ICD-10-CM

## 2021-08-09 DIAGNOSIS — Z79.4 LONG TERM (CURRENT) USE OF INSULIN: ICD-10-CM

## 2021-08-09 DIAGNOSIS — L97.416 NON-PRESSURE CHRONIC ULCER OF RIGHT HEEL AND MIDFOOT WITH BONE INVOLVEMENT WITHOUT EVIDENCE OF NECROSIS: ICD-10-CM

## 2021-08-09 DIAGNOSIS — Z98.890 OTHER SPECIFIED POSTPROCEDURAL STATES: Chronic | ICD-10-CM

## 2021-08-09 DIAGNOSIS — E11.621 TYPE 2 DIABETES MELLITUS WITH FOOT ULCER: ICD-10-CM

## 2021-08-09 DIAGNOSIS — Z86.69 PERSONAL HISTORY OF OTHER DISEASES OF THE NERVOUS SYSTEM AND SENSE ORGANS: Chronic | ICD-10-CM

## 2021-08-09 DIAGNOSIS — S02.91XA UNSPECIFIED FRACTURE OF SKULL, INITIAL ENCOUNTER FOR CLOSED FRACTURE: Chronic | ICD-10-CM

## 2021-08-09 PROCEDURE — 82962 GLUCOSE BLOOD TEST: CPT

## 2021-08-09 PROCEDURE — G0277: CPT

## 2021-08-09 PROCEDURE — 99183 HYPERBARIC OXYGEN THERAPY: CPT

## 2021-08-09 NOTE — ADDENDUM
[FreeTextEntry1] : PT DESCENDED TO 2.0 EULALIO @ 1.75 PSI/MIN WITHOUT INCIDENT IN CHAMBER # 1. PT'S RESTING AT TX DEPTH WITH LEGS ELEVATED. CHEST RISE AND FALL OBSERVED CHAMBER SIDE. \par PT ASCENDED FROM TX DEPTH WITHOUT INCIDENT. PT TOLERATED TX WELL. \par PT SENT HOME.

## 2021-08-09 NOTE — PROCEDURE

## 2021-08-10 ENCOUNTER — OUTPATIENT (OUTPATIENT)
Dept: OUTPATIENT SERVICES | Facility: HOSPITAL | Age: 71
LOS: 1 days | Discharge: HOME | End: 2021-08-10
Payer: MEDICARE

## 2021-08-10 ENCOUNTER — APPOINTMENT (OUTPATIENT)
Dept: HYPERBARIC MEDICINE | Facility: HOSPITAL | Age: 71
End: 2021-08-10
Payer: MEDICARE

## 2021-08-10 VITALS
OXYGEN SATURATION: 98 % | RESPIRATION RATE: 16 BRPM | SYSTOLIC BLOOD PRESSURE: 173 MMHG | HEART RATE: 60 BPM | TEMPERATURE: 97 F | DIASTOLIC BLOOD PRESSURE: 81 MMHG

## 2021-08-10 VITALS
HEART RATE: 60 BPM | OXYGEN SATURATION: 95 % | TEMPERATURE: 98.3 F | RESPIRATION RATE: 16 BRPM | DIASTOLIC BLOOD PRESSURE: 50 MMHG | SYSTOLIC BLOOD PRESSURE: 154 MMHG

## 2021-08-10 DIAGNOSIS — M75.00 ADHESIVE CAPSULITIS OF UNSPECIFIED SHOULDER: Chronic | ICD-10-CM

## 2021-08-10 DIAGNOSIS — S02.91XA UNSPECIFIED FRACTURE OF SKULL, INITIAL ENCOUNTER FOR CLOSED FRACTURE: Chronic | ICD-10-CM

## 2021-08-10 DIAGNOSIS — Z98.890 OTHER SPECIFIED POSTPROCEDURAL STATES: Chronic | ICD-10-CM

## 2021-08-10 DIAGNOSIS — E11.621 TYPE 2 DIABETES MELLITUS WITH FOOT ULCER: ICD-10-CM

## 2021-08-10 DIAGNOSIS — Z86.69 PERSONAL HISTORY OF OTHER DISEASES OF THE NERVOUS SYSTEM AND SENSE ORGANS: Chronic | ICD-10-CM

## 2021-08-10 DIAGNOSIS — L97.416 NON-PRESSURE CHRONIC ULCER OF RIGHT HEEL AND MIDFOOT WITH BONE INVOLVEMENT WITHOUT EVIDENCE OF NECROSIS: ICD-10-CM

## 2021-08-10 DIAGNOSIS — Z79.4 LONG TERM (CURRENT) USE OF INSULIN: ICD-10-CM

## 2021-08-10 PROCEDURE — 99183 HYPERBARIC OXYGEN THERAPY: CPT

## 2021-08-10 PROCEDURE — G0277: CPT

## 2021-08-10 PROCEDURE — 82962 GLUCOSE BLOOD TEST: CPT

## 2021-08-10 NOTE — ADDENDUM
[FreeTextEntry1] : NO DRAINAGE NOTED ON PT'S DRESSING PRIOR TO DESCENT. \par PT DESCENDED TO 2.0 EULALIO @ 1.75 PSI/MIN WITHOUT INCIDENT IN CHAMBER # 3. PT'S RESTING AT TX DEPTH WITH CHEST RISE AND FALL OBSERVED CHAMBER SIDE. \par PT ASCENDED FROM TX DEPTH WITHOUT INCIDENT. PT TOLERATED TX WELL. \par

## 2021-08-10 NOTE — PROCEDURE
[Outpatient] : Outpatient [Wheelchair] : wheelchair [THIS CHAMBER HAS BEEN CLEANED / DISINFECTED] : This chamber has been cleaned / disinfected according to local and hospital policy and procedure prior to this treatment. [____] : Post-Dive: Time - [unfilled] [___] : Post-Dive: Value - [unfilled] mg/dL [Patient demonstrated and verbalized proper technique for using air break mask] : Patient demonstrated and verbalized proper technique for using air break mask [Patient educated on the risks of SMOKING prior to HBOT with understanding] : Patient educated on the risks of SMOKING prior to HBOT with understanding [Patient educated on the risks of CONSUMING ALCOHOL prior to HBOT with understanding] : Patient educated on the risks of CONSUMING ALCOHOL prior to HBOT with understanding [100% Cotton] : 100% cotton [Empty all pockets] : empty all pockets [No hair oils, wigs, hairpieces, pins] : no hair oils, wigs, hairpieces, pins  [Pre tx medications] : pre tx medications  [No make-up, creams] : no make-up, creams  [No jewelry] : no jewelry  [No matches, cigarettes, lighters] : no matches, cigarettes, lighters  [Hearing aid removed] : hearing aid removed [Dentures removed] : dentures removed [Ground bracelet on pt's wrist] : ground bracelet on pt's wrist  [Contacts removed] : contacts removed  [Remove nail polish] : remove nail polish  [No reading material] : no reading material  [Bra, undergarments removed] : bra, undergarments removed  [No contraindicated dressings] : no contraindicated dressings [Ground Wire - VISUAL Verification - Intact/Free of Obstruction] : Ground Wire - VISUAL Verification - Intact/Free of Obstruction  [Ground Continuity - Verified < 1 ohm w/ Wrist Strap Channing] : Ground Continuity - Verified < 1 ohm w/ Wrist Strap Channing [Clear all fields] : clear all fields [Number: ___] : Number: [unfilled] [Diagnosis: ___] : Diagnosis: [unfilled] [] : No [FreeTextEntry3] : 90 [FreeTextEntry5] : 1021 [FreeTextEntry7] : 2869 [FreeTextEntry9] : 6871 [de-identified] : 3127 [de-identified] : 108 MIN

## 2021-08-11 ENCOUNTER — APPOINTMENT (OUTPATIENT)
Dept: HYPERBARIC MEDICINE | Facility: HOSPITAL | Age: 71
End: 2021-08-11
Payer: MEDICARE

## 2021-08-11 ENCOUNTER — OUTPATIENT (OUTPATIENT)
Dept: OUTPATIENT SERVICES | Facility: HOSPITAL | Age: 71
LOS: 1 days | Discharge: HOME | End: 2021-08-11
Payer: MEDICARE

## 2021-08-11 VITALS
HEART RATE: 64 BPM | SYSTOLIC BLOOD PRESSURE: 169 MMHG | OXYGEN SATURATION: 97 % | TEMPERATURE: 97.2 F | RESPIRATION RATE: 20 BRPM | DIASTOLIC BLOOD PRESSURE: 75 MMHG

## 2021-08-11 VITALS
RESPIRATION RATE: 18 BRPM | SYSTOLIC BLOOD PRESSURE: 172 MMHG | DIASTOLIC BLOOD PRESSURE: 75 MMHG | HEART RATE: 55 BPM | OXYGEN SATURATION: 96 % | TEMPERATURE: 97.4 F

## 2021-08-11 DIAGNOSIS — Z98.890 OTHER SPECIFIED POSTPROCEDURAL STATES: Chronic | ICD-10-CM

## 2021-08-11 DIAGNOSIS — M75.00 ADHESIVE CAPSULITIS OF UNSPECIFIED SHOULDER: Chronic | ICD-10-CM

## 2021-08-11 DIAGNOSIS — E11.621 TYPE 2 DIABETES MELLITUS WITH FOOT ULCER: ICD-10-CM

## 2021-08-11 DIAGNOSIS — Z86.69 PERSONAL HISTORY OF OTHER DISEASES OF THE NERVOUS SYSTEM AND SENSE ORGANS: Chronic | ICD-10-CM

## 2021-08-11 DIAGNOSIS — S02.91XA UNSPECIFIED FRACTURE OF SKULL, INITIAL ENCOUNTER FOR CLOSED FRACTURE: Chronic | ICD-10-CM

## 2021-08-11 DIAGNOSIS — Z79.4 LONG TERM (CURRENT) USE OF INSULIN: ICD-10-CM

## 2021-08-11 DIAGNOSIS — L97.416 NON-PRESSURE CHRONIC ULCER OF RIGHT HEEL AND MIDFOOT WITH BONE INVOLVEMENT WITHOUT EVIDENCE OF NECROSIS: ICD-10-CM

## 2021-08-11 PROCEDURE — 99183 HYPERBARIC OXYGEN THERAPY: CPT

## 2021-08-11 PROCEDURE — G0277: CPT

## 2021-08-11 PROCEDURE — 82962 GLUCOSE BLOOD TEST: CPT

## 2021-08-12 ENCOUNTER — OUTPATIENT (OUTPATIENT)
Dept: OUTPATIENT SERVICES | Facility: HOSPITAL | Age: 71
LOS: 1 days | Discharge: HOME | End: 2021-08-12
Payer: MEDICARE

## 2021-08-12 ENCOUNTER — APPOINTMENT (OUTPATIENT)
Dept: WOUND CARE | Facility: HOSPITAL | Age: 71
End: 2021-08-12
Payer: MEDICARE

## 2021-08-12 VITALS
OXYGEN SATURATION: 99 % | HEIGHT: 71 IN | TEMPERATURE: 97.7 F | DIASTOLIC BLOOD PRESSURE: 89 MMHG | HEART RATE: 73 BPM | SYSTOLIC BLOOD PRESSURE: 137 MMHG | RESPIRATION RATE: 18 BRPM | WEIGHT: 293 LBS | BODY MASS INDEX: 41.02 KG/M2

## 2021-08-12 DIAGNOSIS — E11.40 TYPE 2 DIABETES MELLITUS WITH DIABETIC NEUROPATHY, UNSPECIFIED: ICD-10-CM

## 2021-08-12 DIAGNOSIS — Z98.890 OTHER SPECIFIED POSTPROCEDURAL STATES: Chronic | ICD-10-CM

## 2021-08-12 DIAGNOSIS — Z82.5 FAMILY HISTORY OF ASTHMA AND OTHER CHRONIC LOWER RESPIRATORY DISEASES: ICD-10-CM

## 2021-08-12 DIAGNOSIS — Z98.890 OTHER SPECIFIED POSTPROCEDURAL STATES: ICD-10-CM

## 2021-08-12 DIAGNOSIS — E11.621 TYPE 2 DIABETES MELLITUS WITH FOOT ULCER: ICD-10-CM

## 2021-08-12 DIAGNOSIS — Z79.4 LONG TERM (CURRENT) USE OF INSULIN: ICD-10-CM

## 2021-08-12 DIAGNOSIS — Z79.82 LONG TERM (CURRENT) USE OF ASPIRIN: ICD-10-CM

## 2021-08-12 DIAGNOSIS — Z79.899 OTHER LONG TERM (CURRENT) DRUG THERAPY: ICD-10-CM

## 2021-08-12 DIAGNOSIS — Z83.3 FAMILY HISTORY OF DIABETES MELLITUS: ICD-10-CM

## 2021-08-12 DIAGNOSIS — Z88.0 ALLERGY STATUS TO PENICILLIN: ICD-10-CM

## 2021-08-12 DIAGNOSIS — Z98.49 CATARACT EXTRACTION STATUS, UNSPECIFIED EYE: ICD-10-CM

## 2021-08-12 DIAGNOSIS — L97.416 NON-PRESSURE CHRONIC ULCER OF RIGHT HEEL AND MIDFOOT WITH BONE INVOLVEMENT WITHOUT EVIDENCE OF NECROSIS: ICD-10-CM

## 2021-08-12 DIAGNOSIS — M75.00 ADHESIVE CAPSULITIS OF UNSPECIFIED SHOULDER: Chronic | ICD-10-CM

## 2021-08-12 DIAGNOSIS — S02.91XA UNSPECIFIED FRACTURE OF SKULL, INITIAL ENCOUNTER FOR CLOSED FRACTURE: Chronic | ICD-10-CM

## 2021-08-12 DIAGNOSIS — E66.01 MORBID (SEVERE) OBESITY DUE TO EXCESS CALORIES: ICD-10-CM

## 2021-08-12 DIAGNOSIS — Z86.69 PERSONAL HISTORY OF OTHER DISEASES OF THE NERVOUS SYSTEM AND SENSE ORGANS: Chronic | ICD-10-CM

## 2021-08-12 PROCEDURE — 99213 OFFICE O/P EST LOW 20 MIN: CPT

## 2021-08-12 PROCEDURE — G0463: CPT

## 2021-08-12 NOTE — VITALS
[] : No [de-identified] : Pt rates pain 0/10.  Patient denies any pain or discomfort at the present  [FreeTextEntry5] : PICC d/c'd last week

## 2021-08-12 NOTE — PHYSICAL EXAM
[2+] : left 2+ [Ankle Swelling (On Exam)] : not present [Varicose Veins Of Lower Extremities] : not present [] : not present [Skin Ulcer] : ulcer [Alert] : alert [Oriented to Person] : oriented to person [Oriented to Place] : oriented to place [Oriented to Time] : oriented to time [Calm] : calm [de-identified] : A&Ox3, NAD [de-identified] : HTN, diabetic small vessel disease  [de-identified] : morbid obesity  [de-identified] : s/p right foot sesamoidectomy, 3/5 strength in all quadrants bilaterally [de-identified] : right foot plantar diabetic ulcer  currently closed [de-identified] : Diminished light touch sensation bilaterally [FreeTextEntry1] : Right Plantar Foot 1st Met- Closed [de-identified] : NONE [de-identified] : Cleansed with Normal saline\par White Sock  [FreeTextEntry7] : Right Hallux- dry skin area/ no open wound/ skin intact- seen by Dr Rangel [de-identified] : Gary Border for protection [de-identified] : Cleansed with Normal saline\par  [TWNoteComboBox4] : None

## 2021-08-12 NOTE — PROCEDURE
[Outpatient] : Outpatient [Cane] : cane [Wheelchair] : wheelchair [THIS CHAMBER HAS BEEN CLEANED / DISINFECTED] : This chamber has been cleaned / disinfected according to local and hospital policy and procedure prior to this treatment. [Patient demonstrated and verbalized proper technique for using air break mask] : Patient demonstrated and verbalized proper technique for using air break mask [Patient educated on the risks of SMOKING prior to HBOT with understanding] : Patient educated on the risks of SMOKING prior to HBOT with understanding [Patient educated on the risks of CONSUMING ALCOHOL prior to HBOT with understanding] : Patient educated on the risks of CONSUMING ALCOHOL prior to HBOT with understanding [100% Cotton] : 100% cotton [Empty all pockets] : empty all pockets [No hair oils, wigs, hairpieces, pins] : no hair oils, wigs, hairpieces, pins  [Pre tx medications] : pre tx medications  [No make-up, creams] : no make-up, creams  [No jewelry] : no jewelry  [No matches, cigarettes, lighters] : no matches, cigarettes, lighters  [Hearing aid removed] : hearing aid removed [Dentures removed] : dentures removed [Ground bracelet on pt's wrist] : ground bracelet on pt's wrist  [Contacts removed] : contacts removed  [Remove nail polish] : remove nail polish  [No reading material] : no reading material  [Bra, undergarments removed] : bra, undergarments removed  [No contraindicated dressings] : no contraindicated dressings [Ground Wire - VISUAL Verification - Intact/Free of Obstruction] : Ground Wire - VISUAL Verification - Intact/Free of Obstruction  [Ground Continuity - Verified < 1 ohm w/ Wrist Strap Channing] : Ground Continuity - Verified < 1 ohm w/ Wrist Strap Channing [Number: ___] : Number: [unfilled] [Diagnosis: ___] : Diagnosis: [unfilled] [____] : Post-Dive: Time - [unfilled] [___] : Post-Dive: Value - [unfilled] mg/dL [Clear all fields] : clear all fields [] : No [FreeTextEntry3] : 90 [FreeTextEntry5] : 0382 [[facility]] : 4560 [FreeTextEntry9] : 5819 [de-identified] : 3529 [de-identified] : 108 MIN

## 2021-08-12 NOTE — ASSESSMENT
[Verbal] : Verbal [Patient] : Patient [Good - alert, interested, motivated] : Good - alert, interested, motivated [Verbalizes knowledge/Understanding] : Verbalizes knowledge/understanding [Foot Care] : foot care [Skin Care] : skin care [Signs and symptoms of infection] : sign and symptoms of infection [Nutrition] : nutrition [How and When to Call] : how and when to call [Hyperbaric Therapy] : hyperbaric therapy [Off-loading] : off-loading [Patient responsibility to plan of care] : patient responsibility to plan of care [Glycemic Control] : glycemic control [] : Yes [Stable] : stable [Home] : Home [Wheelchair] : Wheelchair [Not Applicable - Long Term Care/Home Health Agency] : Long Term Care/Home Health Agency: Not Applicable [FreeTextEntry2] : Infection Prevention\par Localized Wound Care\par Offloading / Pressure Relief\par Maintain Optimal Skin Integrity \par Maintain acceptable pain level with use of pharmacological and nonpharmacological interventions  [FreeTextEntry4] : F/U to [facility] for an assessment in Two Weeks\par Pt has completed 23/30 HBOT; no additional HBOT ordered at this time

## 2021-08-12 NOTE — ADDENDUM
[FreeTextEntry1] : PT RECEIVED DRESSING CHANGE PRIOR TO DESCENT. \par PT DESCENDED TO 2.0 EULALIO @ 1.75 PSI/MIN WITHOUT INCIDENT IN CHAMBER # 2. PT'S RESTING AT TX DEPTH WITH LEGS ELEVATED. CHEST RISE AND FALL OBSERVED CHAMBER SIDE. \par PT ASCENDED FROM TX DEPTH WITHOUT INCIDENT. PT TOLERATED TX WELL.\par PT MADE AWARE OF ASSESSMENT TOMORROW, 8/12/21 WITH APPROPRIATE ARRIVAL TIME.

## 2021-08-12 NOTE — REVIEW OF SYSTEMS
[Fever] : no fever [Eye Pain] : no eye pain [Earache] : no earache [Loss Of Hearing] : no hearing loss [Chest Pain] : no chest pain [Shortness Of Breath] : no shortness of breath [Cough] : no cough [Abdominal Pain] : no abdominal pain [Joint Stiffness] : joint stiffness [Skin Wound] : skin wound [Anxiety] : no anxiety [Easy Bleeding] : no tendency for easy bleeding [FreeTextEntry2] : morbid obesity [FreeTextEntry5] : HTN , HLD [FreeTextEntry9] : morbid obesity  [de-identified] : right foot plantar DFU 3  closed , 1st metatarsal  [de-identified] : diabetic neuropathy [de-identified] : KENNY

## 2021-08-13 ENCOUNTER — OUTPATIENT (OUTPATIENT)
Dept: OUTPATIENT SERVICES | Facility: HOSPITAL | Age: 71
LOS: 1 days | Discharge: HOME | End: 2021-08-13
Payer: MEDICARE

## 2021-08-13 ENCOUNTER — APPOINTMENT (OUTPATIENT)
Dept: HYPERBARIC MEDICINE | Facility: HOSPITAL | Age: 71
End: 2021-08-13
Payer: MEDICARE

## 2021-08-13 VITALS
TEMPERATURE: 97.2 F | DIASTOLIC BLOOD PRESSURE: 73 MMHG | RESPIRATION RATE: 20 BRPM | HEART RATE: 62 BPM | OXYGEN SATURATION: 100 % | SYSTOLIC BLOOD PRESSURE: 136 MMHG

## 2021-08-13 VITALS
SYSTOLIC BLOOD PRESSURE: 164 MMHG | TEMPERATURE: 97.1 F | DIASTOLIC BLOOD PRESSURE: 75 MMHG | RESPIRATION RATE: 16 BRPM | OXYGEN SATURATION: 96 % | HEART RATE: 60 BPM

## 2021-08-13 DIAGNOSIS — Z98.890 OTHER SPECIFIED POSTPROCEDURAL STATES: Chronic | ICD-10-CM

## 2021-08-13 DIAGNOSIS — E11.621 TYPE 2 DIABETES MELLITUS WITH FOOT ULCER: ICD-10-CM

## 2021-08-13 DIAGNOSIS — S02.91XA UNSPECIFIED FRACTURE OF SKULL, INITIAL ENCOUNTER FOR CLOSED FRACTURE: Chronic | ICD-10-CM

## 2021-08-13 DIAGNOSIS — Z79.4 LONG TERM (CURRENT) USE OF INSULIN: ICD-10-CM

## 2021-08-13 DIAGNOSIS — L97.416 NON-PRESSURE CHRONIC ULCER OF RIGHT HEEL AND MIDFOOT WITH BONE INVOLVEMENT WITHOUT EVIDENCE OF NECROSIS: ICD-10-CM

## 2021-08-13 DIAGNOSIS — M75.00 ADHESIVE CAPSULITIS OF UNSPECIFIED SHOULDER: Chronic | ICD-10-CM

## 2021-08-13 DIAGNOSIS — Z86.69 PERSONAL HISTORY OF OTHER DISEASES OF THE NERVOUS SYSTEM AND SENSE ORGANS: Chronic | ICD-10-CM

## 2021-08-13 PROCEDURE — 99183 HYPERBARIC OXYGEN THERAPY: CPT

## 2021-08-13 PROCEDURE — G0277: CPT

## 2021-08-13 PROCEDURE — 82962 GLUCOSE BLOOD TEST: CPT

## 2021-08-14 ENCOUNTER — APPOINTMENT (OUTPATIENT)
Dept: HYPERBARIC MEDICINE | Facility: HOSPITAL | Age: 71
End: 2021-08-14
Payer: MEDICARE

## 2021-08-14 ENCOUNTER — OUTPATIENT (OUTPATIENT)
Dept: OUTPATIENT SERVICES | Facility: HOSPITAL | Age: 71
LOS: 1 days | Discharge: HOME | End: 2021-08-14
Payer: MEDICARE

## 2021-08-14 VITALS
TEMPERATURE: 97.2 F | SYSTOLIC BLOOD PRESSURE: 163 MMHG | HEART RATE: 60 BPM | DIASTOLIC BLOOD PRESSURE: 71 MMHG | RESPIRATION RATE: 18 BRPM | OXYGEN SATURATION: 99 %

## 2021-08-14 VITALS
RESPIRATION RATE: 16 BRPM | TEMPERATURE: 97.4 F | SYSTOLIC BLOOD PRESSURE: 151 MMHG | HEART RATE: 68 BPM | DIASTOLIC BLOOD PRESSURE: 68 MMHG | OXYGEN SATURATION: 99 %

## 2021-08-14 DIAGNOSIS — Z98.890 OTHER SPECIFIED POSTPROCEDURAL STATES: Chronic | ICD-10-CM

## 2021-08-14 DIAGNOSIS — L97.416 NON-PRESSURE CHRONIC ULCER OF RIGHT HEEL AND MIDFOOT WITH BONE INVOLVEMENT WITHOUT EVIDENCE OF NECROSIS: ICD-10-CM

## 2021-08-14 DIAGNOSIS — E11.621 TYPE 2 DIABETES MELLITUS WITH FOOT ULCER: ICD-10-CM

## 2021-08-14 DIAGNOSIS — M75.00 ADHESIVE CAPSULITIS OF UNSPECIFIED SHOULDER: Chronic | ICD-10-CM

## 2021-08-14 DIAGNOSIS — Z79.4 LONG TERM (CURRENT) USE OF INSULIN: ICD-10-CM

## 2021-08-14 DIAGNOSIS — Z86.69 PERSONAL HISTORY OF OTHER DISEASES OF THE NERVOUS SYSTEM AND SENSE ORGANS: Chronic | ICD-10-CM

## 2021-08-14 DIAGNOSIS — S02.91XA UNSPECIFIED FRACTURE OF SKULL, INITIAL ENCOUNTER FOR CLOSED FRACTURE: Chronic | ICD-10-CM

## 2021-08-14 PROCEDURE — 99183 HYPERBARIC OXYGEN THERAPY: CPT

## 2021-08-14 PROCEDURE — G0277: CPT

## 2021-08-14 PROCEDURE — 82962 GLUCOSE BLOOD TEST: CPT

## 2021-08-16 ENCOUNTER — APPOINTMENT (OUTPATIENT)
Dept: HYPERBARIC MEDICINE | Facility: HOSPITAL | Age: 71
End: 2021-08-16
Payer: MEDICARE

## 2021-08-16 ENCOUNTER — OUTPATIENT (OUTPATIENT)
Dept: OUTPATIENT SERVICES | Facility: HOSPITAL | Age: 71
LOS: 1 days | Discharge: HOME | End: 2021-08-16
Payer: MEDICARE

## 2021-08-16 VITALS
OXYGEN SATURATION: 100 % | HEART RATE: 57 BPM | SYSTOLIC BLOOD PRESSURE: 163 MMHG | RESPIRATION RATE: 20 BRPM | DIASTOLIC BLOOD PRESSURE: 68 MMHG | TEMPERATURE: 97.6 F

## 2021-08-16 VITALS
SYSTOLIC BLOOD PRESSURE: 141 MMHG | DIASTOLIC BLOOD PRESSURE: 69 MMHG | RESPIRATION RATE: 20 BRPM | OXYGEN SATURATION: 95 % | HEART RATE: 69 BPM | TEMPERATURE: 97.2 F

## 2021-08-16 DIAGNOSIS — Z98.890 OTHER SPECIFIED POSTPROCEDURAL STATES: Chronic | ICD-10-CM

## 2021-08-16 DIAGNOSIS — Z79.4 LONG TERM (CURRENT) USE OF INSULIN: ICD-10-CM

## 2021-08-16 DIAGNOSIS — L97.416 NON-PRESSURE CHRONIC ULCER OF RIGHT HEEL AND MIDFOOT WITH BONE INVOLVEMENT WITHOUT EVIDENCE OF NECROSIS: ICD-10-CM

## 2021-08-16 DIAGNOSIS — E11.621 TYPE 2 DIABETES MELLITUS WITH FOOT ULCER: ICD-10-CM

## 2021-08-16 DIAGNOSIS — S02.91XA UNSPECIFIED FRACTURE OF SKULL, INITIAL ENCOUNTER FOR CLOSED FRACTURE: Chronic | ICD-10-CM

## 2021-08-16 DIAGNOSIS — Z86.69 PERSONAL HISTORY OF OTHER DISEASES OF THE NERVOUS SYSTEM AND SENSE ORGANS: Chronic | ICD-10-CM

## 2021-08-16 DIAGNOSIS — M75.00 ADHESIVE CAPSULITIS OF UNSPECIFIED SHOULDER: Chronic | ICD-10-CM

## 2021-08-16 PROCEDURE — 82962 GLUCOSE BLOOD TEST: CPT

## 2021-08-16 PROCEDURE — G0277: CPT

## 2021-08-16 PROCEDURE — 99183 HYPERBARIC OXYGEN THERAPY: CPT

## 2021-08-17 ENCOUNTER — OUTPATIENT (OUTPATIENT)
Dept: OUTPATIENT SERVICES | Facility: HOSPITAL | Age: 71
LOS: 1 days | Discharge: HOME | End: 2021-08-17
Payer: MEDICARE

## 2021-08-17 ENCOUNTER — APPOINTMENT (OUTPATIENT)
Dept: HYPERBARIC MEDICINE | Facility: HOSPITAL | Age: 71
End: 2021-08-17
Payer: MEDICARE

## 2021-08-17 VITALS
SYSTOLIC BLOOD PRESSURE: 151 MMHG | HEART RATE: 56 BPM | DIASTOLIC BLOOD PRESSURE: 61 MMHG | OXYGEN SATURATION: 100 % | TEMPERATURE: 97.7 F | RESPIRATION RATE: 20 BRPM

## 2021-08-17 VITALS
DIASTOLIC BLOOD PRESSURE: 54 MMHG | SYSTOLIC BLOOD PRESSURE: 151 MMHG | RESPIRATION RATE: 18 BRPM | TEMPERATURE: 97 F | HEART RATE: 66 BPM | OXYGEN SATURATION: 98 %

## 2021-08-17 DIAGNOSIS — Z98.890 OTHER SPECIFIED POSTPROCEDURAL STATES: Chronic | ICD-10-CM

## 2021-08-17 DIAGNOSIS — M75.00 ADHESIVE CAPSULITIS OF UNSPECIFIED SHOULDER: Chronic | ICD-10-CM

## 2021-08-17 DIAGNOSIS — E11.621 TYPE 2 DIABETES MELLITUS WITH FOOT ULCER: ICD-10-CM

## 2021-08-17 DIAGNOSIS — Z86.69 PERSONAL HISTORY OF OTHER DISEASES OF THE NERVOUS SYSTEM AND SENSE ORGANS: Chronic | ICD-10-CM

## 2021-08-17 DIAGNOSIS — Z79.4 LONG TERM (CURRENT) USE OF INSULIN: ICD-10-CM

## 2021-08-17 DIAGNOSIS — L97.416 NON-PRESSURE CHRONIC ULCER OF RIGHT HEEL AND MIDFOOT WITH BONE INVOLVEMENT WITHOUT EVIDENCE OF NECROSIS: ICD-10-CM

## 2021-08-17 DIAGNOSIS — S02.91XA UNSPECIFIED FRACTURE OF SKULL, INITIAL ENCOUNTER FOR CLOSED FRACTURE: Chronic | ICD-10-CM

## 2021-08-17 PROCEDURE — 99183 HYPERBARIC OXYGEN THERAPY: CPT

## 2021-08-17 PROCEDURE — 82962 GLUCOSE BLOOD TEST: CPT

## 2021-08-17 PROCEDURE — G0277: CPT

## 2021-08-17 NOTE — ADDENDUM
[FreeTextEntry1] : Pt descended to 2.0 ELUALIO @ 1.75 PSI/MIN without incident in chamber #1.\par Pt resting comfortably @ depth, with equal chest rise observed throughout tx.\par Pt ascended from 2.0 EULALIO @ 1.75 PSI/MIN without incident in chamber #1.\par Pt tolerated treatment well.\par

## 2021-08-17 NOTE — PROCEDURE
[Outpatient] : Outpatient [Ambulatory] : Patient is ambulatory. [Cane] : cane [Wheelchair] : wheelchair [THIS CHAMBER HAS BEEN CLEANED / DISINFECTED] : This chamber has been cleaned / disinfected according to local and hospital policy and procedure prior to this treatment. [Patient demonstrated and verbalized proper technique for using air break mask] : Patient demonstrated and verbalized proper technique for using air break mask [Patient educated on the risks of SMOKING prior to HBOT with understanding] : Patient educated on the risks of SMOKING prior to HBOT with understanding [Patient educated on the risks of CONSUMING ALCOHOL prior to HBOT with understanding] : Patient educated on the risks of CONSUMING ALCOHOL prior to HBOT with understanding [100% Cotton] : 100% cotton [Empty all pockets] : empty all pockets [No hair oils, wigs, hairpieces, pins] : no hair oils, wigs, hairpieces, pins  [Pre tx medications] : pre tx medications  [No make-up, creams] : no make-up, creams  [No jewelry] : no jewelry  [No matches, cigarettes, lighters] : no matches, cigarettes, lighters  [Hearing aid removed] : hearing aid removed [Dentures removed] : dentures removed [Ground bracelet on pt's wrist] : ground bracelet on pt's wrist  [Contacts removed] : contacts removed  [Remove nail polish] : remove nail polish  [No reading material] : no reading material  [Bra, undergarments removed] : bra, undergarments removed  [No contraindicated dressings] : no contraindicated dressings [Ground Wire - VISUAL Verification - Intact/Free of Obstruction] : Ground Wire - VISUAL Verification - Intact/Free of Obstruction  [Ground Continuity - Verified < 1 ohm w/ Wrist Strap Channing] : Ground Continuity - Verified < 1 ohm w/ Wrist Strap Channing [Clear all fields] : clear all fields [Number: ___] : Number: [unfilled] [Diagnosis: ___] : Diagnosis: [unfilled] [____] : Post-Dive: Time - [unfilled] [___] : Post-Dive: Value - [unfilled] mg/dL [] : No [FreeTextEntry3] : 90 [FreeTextEntry5] : 9437 [FreeTextEntry7] : 1031 [FreeTextEntry9] : 5815 [de-identified] : 4616 [de-identified] : 108 MIN

## 2021-08-18 ENCOUNTER — APPOINTMENT (OUTPATIENT)
Dept: HYPERBARIC MEDICINE | Facility: HOSPITAL | Age: 71
End: 2021-08-18
Payer: MEDICARE

## 2021-08-18 ENCOUNTER — OUTPATIENT (OUTPATIENT)
Dept: OUTPATIENT SERVICES | Facility: HOSPITAL | Age: 71
LOS: 1 days | Discharge: HOME | End: 2021-08-18
Payer: MEDICARE

## 2021-08-18 VITALS
OXYGEN SATURATION: 98 % | DIASTOLIC BLOOD PRESSURE: 59 MMHG | SYSTOLIC BLOOD PRESSURE: 156 MMHG | RESPIRATION RATE: 20 BRPM | HEART RATE: 60 BPM | TEMPERATURE: 97.8 F

## 2021-08-18 VITALS
RESPIRATION RATE: 18 BRPM | TEMPERATURE: 97.1 F | SYSTOLIC BLOOD PRESSURE: 170 MMHG | DIASTOLIC BLOOD PRESSURE: 71 MMHG | HEART RATE: 61 BPM | OXYGEN SATURATION: 99 %

## 2021-08-18 DIAGNOSIS — M75.00 ADHESIVE CAPSULITIS OF UNSPECIFIED SHOULDER: Chronic | ICD-10-CM

## 2021-08-18 DIAGNOSIS — Z79.4 LONG TERM (CURRENT) USE OF INSULIN: ICD-10-CM

## 2021-08-18 DIAGNOSIS — Z98.890 OTHER SPECIFIED POSTPROCEDURAL STATES: Chronic | ICD-10-CM

## 2021-08-18 DIAGNOSIS — E11.621 TYPE 2 DIABETES MELLITUS WITH FOOT ULCER: ICD-10-CM

## 2021-08-18 DIAGNOSIS — L97.416 NON-PRESSURE CHRONIC ULCER OF RIGHT HEEL AND MIDFOOT WITH BONE INVOLVEMENT WITHOUT EVIDENCE OF NECROSIS: ICD-10-CM

## 2021-08-18 DIAGNOSIS — S02.91XA UNSPECIFIED FRACTURE OF SKULL, INITIAL ENCOUNTER FOR CLOSED FRACTURE: Chronic | ICD-10-CM

## 2021-08-18 DIAGNOSIS — Z86.69 PERSONAL HISTORY OF OTHER DISEASES OF THE NERVOUS SYSTEM AND SENSE ORGANS: Chronic | ICD-10-CM

## 2021-08-18 PROCEDURE — G0277: CPT

## 2021-08-18 PROCEDURE — 99183 HYPERBARIC OXYGEN THERAPY: CPT

## 2021-08-18 PROCEDURE — 82962 GLUCOSE BLOOD TEST: CPT

## 2021-08-18 NOTE — ADDENDUM
[FreeTextEntry1] : The pt. presented to [facility] ambulating with cane and transferred to wheelchair. \par The pt. was A&Ox3 for scheduled HBOT.\par The pt's pre-dive screening was found to be within acceptable limits to begin HBOT.\par The pt. was provided elevation/off-loading measure to B/L lower extremities prior to HBOT. \par The pt. descended @ 1.75 PSI/min. to Rx'd HBOT tx. depth of 2.0 EULALIO in chamber # 3 without incident.\par The pt. was observed with visible chest motion and without incident for the duration of HBOT. \par The pt. ascended from tx. depth to surface without incident.\par The pt. tolerated HBOT without incident. \par

## 2021-08-18 NOTE — PROCEDURE
[Outpatient] : Outpatient [Ambulatory] : Patient is ambulatory. [Cane] : cane [Wheelchair] : wheelchair [THIS CHAMBER HAS BEEN CLEANED / DISINFECTED] : This chamber has been cleaned / disinfected according to local and hospital policy and procedure prior to this treatment. [Patient demonstrated and verbalized proper technique for using air break mask] : Patient demonstrated and verbalized proper technique for using air break mask [Patient educated on the risks of SMOKING prior to HBOT with understanding] : Patient educated on the risks of SMOKING prior to HBOT with understanding [Patient educated on the risks of CONSUMING ALCOHOL prior to HBOT with understanding] : Patient educated on the risks of CONSUMING ALCOHOL prior to HBOT with understanding [100% Cotton] : 100% cotton [Empty all pockets] : empty all pockets [No hair oils, wigs, hairpieces, pins] : no hair oils, wigs, hairpieces, pins  [Pre tx medications] : pre tx medications  [No make-up, creams] : no make-up, creams  [No jewelry] : no jewelry  [No matches, cigarettes, lighters] : no matches, cigarettes, lighters  [Hearing aid removed] : hearing aid removed [Dentures removed] : dentures removed [Ground bracelet on pt's wrist] : ground bracelet on pt's wrist  [Contacts removed] : contacts removed  [Remove nail polish] : remove nail polish  [No reading material] : no reading material  [No contraindicated dressings] : no contraindicated dressings [Bra, undergarments removed] : bra, undergarments removed  [Ground Wire - VISUAL Verification - Intact/Free of Obstruction] : Ground Wire - VISUAL Verification - Intact/Free of Obstruction  [Ground Continuity - Verified < 1 ohm w/ Wrist Strap Channing] : Ground Continuity - Verified < 1 ohm w/ Wrist Strap Channing [Number: ___] : Number: [unfilled] [Clear all fields] : clear all fields [Diagnosis: ___] : Diagnosis: [unfilled] [____] : Post-Dive: Time - [unfilled] [___] : Post-Dive: Value - [unfilled] mg/dL [] : No [FreeTextEntry3] : 90 [FreeTextEntry5] : 6100 [FreeTextEntry7] : 7191 [FreeTextEntry9] : 5790 [de-identified] : 8594 [de-identified] : 108mins

## 2021-08-18 NOTE — PROCEDURE
[Outpatient] : Outpatient [Ambulatory] : Patient is ambulatory. [Cane] : cane [THIS CHAMBER HAS BEEN CLEANED / DISINFECTED] : This chamber has been cleaned / disinfected according to local and hospital policy and procedure prior to this treatment. [Patient demonstrated and verbalized proper technique for using air break mask] : Patient demonstrated and verbalized proper technique for using air break mask [Patient educated on the risks of SMOKING prior to HBOT with understanding] : Patient educated on the risks of SMOKING prior to HBOT with understanding [Patient educated on the risks of CONSUMING ALCOHOL prior to HBOT with understanding] : Patient educated on the risks of CONSUMING ALCOHOL prior to HBOT with understanding [100% Cotton] : 100% cotton [Empty all pockets] : empty all pockets [No hair oils, wigs, hairpieces, pins] : no hair oils, wigs, hairpieces, pins  [Pre tx medications] : pre tx medications  [No make-up, creams] : no make-up, creams  [No jewelry] : no jewelry  [No matches, cigarettes, lighters] : no matches, cigarettes, lighters  [Hearing aid removed] : hearing aid removed [Dentures removed] : dentures removed [Ground bracelet on pt's wrist] : ground bracelet on pt's wrist  [Contacts removed] : contacts removed  [Remove nail polish] : remove nail polish  [No reading material] : no reading material  [Bra, undergarments removed] : bra, undergarments removed  [No contraindicated dressings] : no contraindicated dressings [Ground Wire - VISUAL Verification - Intact/Free of Obstruction] : Ground Wire - VISUAL Verification - Intact/Free of Obstruction  [Ground Continuity - Verified < 1 ohm w/ Wrist Strap Channing] : Ground Continuity - Verified < 1 ohm w/ Wrist Strap Channing [Number: ___] : Number: [unfilled] [Diagnosis: ___] : Diagnosis: [unfilled] [____] : Post-Dive: Time - [unfilled] [___] : Post-Dive: Value - [unfilled] mg/dL [Clear all fields] : clear all fields [FreeTextEntry3] : 90 [] : No [FreeTextEntry5] : 3169 [FreeTextEntry7] : 6934 [FreeTextEntry9] : 2783 [de-identified] : 2076 [de-identified] : 108 MINUTES

## 2021-08-18 NOTE — PROCEDURE
[Outpatient] : Outpatient [Ambulatory] : Patient is ambulatory. [Cane] : cane [Wheelchair] : wheelchair [THIS CHAMBER HAS BEEN CLEANED / DISINFECTED] : This chamber has been cleaned / disinfected according to local and hospital policy and procedure prior to this treatment. [Patient demonstrated and verbalized proper technique for using air break mask] : Patient demonstrated and verbalized proper technique for using air break mask [Patient educated on the risks of SMOKING prior to HBOT with understanding] : Patient educated on the risks of SMOKING prior to HBOT with understanding [Patient educated on the risks of CONSUMING ALCOHOL prior to HBOT with understanding] : Patient educated on the risks of CONSUMING ALCOHOL prior to HBOT with understanding [100% Cotton] : 100% cotton [No hair oils, wigs, hairpieces, pins] : no hair oils, wigs, hairpieces, pins  [Empty all pockets] : empty all pockets [Pre tx medications] : pre tx medications  [No make-up, creams] : no make-up, creams  [No jewelry] : no jewelry  [No matches, cigarettes, lighters] : no matches, cigarettes, lighters  [Hearing aid removed] : hearing aid removed [Dentures removed] : dentures removed [Ground bracelet on pt's wrist] : ground bracelet on pt's wrist  [Contacts removed] : contacts removed  [Remove nail polish] : remove nail polish  [No reading material] : no reading material  [Bra, undergarments removed] : bra, undergarments removed  [No contraindicated dressings] : no contraindicated dressings [Ground Wire - VISUAL Verification - Intact/Free of Obstruction] : Ground Wire - VISUAL Verification - Intact/Free of Obstruction  [Ground Continuity - Verified < 1 ohm w/ Wrist Strap Channing] : Ground Continuity - Verified < 1 ohm w/ Wrist Strap Channing [Number: ___] : Number: [unfilled] [Diagnosis: ___] : Diagnosis: [unfilled] [____] : Post-Dive: Time - [unfilled] [___] : Post-Dive: Value - [unfilled] mg/dL [Clear all fields] : clear all fields [] : No [FreeTextEntry3] : 90 [FreeTextEntry5] : 12 : 49 [FreeTextEntry7] : 12 : 58 [FreeTextEntry9] : 14 : 28 [de-identified] : 14 : 37 [de-identified] : 108 min.

## 2021-08-18 NOTE — ADDENDUM
[FreeTextEntry1] : PT ARRIVED A&OX3 WITH THE USE OF A CANE\par ALL VITALS WITHIN PARAMETERS TO BEGIN HBOT\par PT DESCENDED TO 2.0 EULALIO @ 1.75 PSI/MIN IN CHAMBER #3 WITHOUT INCIDENT\par PT RESTING AT TX DEPTH WITH VISIBLE CHEST RISE AND FALL OBSERVED CHAMBER SIDE\par PT ASCENDED FROM 2.0 EULALIO @ 1.75 PSI/MIN WITHOUT INCIDENT\par PT TOLERATED TX WELL\par

## 2021-08-18 NOTE — PROCEDURE
[Outpatient] : Outpatient [Ambulatory] : Patient is ambulatory. [Cane] : cane [Wheelchair] : wheelchair [THIS CHAMBER HAS BEEN CLEANED / DISINFECTED] : This chamber has been cleaned / disinfected according to local and hospital policy and procedure prior to this treatment. [____] : Post-Dive: Time - [unfilled] [___] : Post-Dive: Value - [unfilled] mg/dL [Patient demonstrated and verbalized proper technique for using air break mask] : Patient demonstrated and verbalized proper technique for using air break mask [Patient educated on the risks of SMOKING prior to HBOT with understanding] : Patient educated on the risks of SMOKING prior to HBOT with understanding [Patient educated on the risks of CONSUMING ALCOHOL prior to HBOT with understanding] : Patient educated on the risks of CONSUMING ALCOHOL prior to HBOT with understanding [100% Cotton] : 100% cotton [Empty all pockets] : empty all pockets [No hair oils, wigs, hairpieces, pins] : no hair oils, wigs, hairpieces, pins  [Pre tx medications] : pre tx medications  [No make-up, creams] : no make-up, creams  [No jewelry] : no jewelry  [No matches, cigarettes, lighters] : no matches, cigarettes, lighters  [Hearing aid removed] : hearing aid removed [Dentures removed] : dentures removed [Ground bracelet on pt's wrist] : ground bracelet on pt's wrist  [Contacts removed] : contacts removed  [Remove nail polish] : remove nail polish  [No reading material] : no reading material  [Bra, undergarments removed] : bra, undergarments removed  [No contraindicated dressings] : no contraindicated dressings [Ground Wire - VISUAL Verification - Intact/Free of Obstruction] : Ground Wire - VISUAL Verification - Intact/Free of Obstruction  [Ground Continuity - Verified < 1 ohm w/ Wrist Strap Channing] : Ground Continuity - Verified < 1 ohm w/ Wrist Strap Channing [Clear all fields] : clear all fields [Number: ___] : Number: [unfilled] [Diagnosis: ___] : Diagnosis: [unfilled] [] : No [FreeTextEntry3] : 90 [FreeTextEntry5] : 5954 [FreeTextEntry7] : 2836 [FreeTextEntry9] : 111 [de-identified] : 1129 [de-identified] : 108 MIN

## 2021-08-19 ENCOUNTER — OUTPATIENT (OUTPATIENT)
Dept: OUTPATIENT SERVICES | Facility: HOSPITAL | Age: 71
LOS: 1 days | Discharge: HOME | End: 2021-08-19
Payer: MEDICARE

## 2021-08-19 ENCOUNTER — APPOINTMENT (OUTPATIENT)
Dept: HYPERBARIC MEDICINE | Facility: HOSPITAL | Age: 71
End: 2021-08-19
Payer: MEDICARE

## 2021-08-19 VITALS
RESPIRATION RATE: 18 BRPM | OXYGEN SATURATION: 96 % | HEART RATE: 67 BPM | SYSTOLIC BLOOD PRESSURE: 157 MMHG | TEMPERATURE: 97.2 F | DIASTOLIC BLOOD PRESSURE: 71 MMHG

## 2021-08-19 VITALS
HEART RATE: 59 BPM | SYSTOLIC BLOOD PRESSURE: 167 MMHG | TEMPERATURE: 96.9 F | DIASTOLIC BLOOD PRESSURE: 72 MMHG | RESPIRATION RATE: 20 BRPM | OXYGEN SATURATION: 100 %

## 2021-08-19 DIAGNOSIS — Z98.890 OTHER SPECIFIED POSTPROCEDURAL STATES: Chronic | ICD-10-CM

## 2021-08-19 DIAGNOSIS — E11.621 TYPE 2 DIABETES MELLITUS WITH FOOT ULCER: ICD-10-CM

## 2021-08-19 DIAGNOSIS — S02.91XA UNSPECIFIED FRACTURE OF SKULL, INITIAL ENCOUNTER FOR CLOSED FRACTURE: Chronic | ICD-10-CM

## 2021-08-19 DIAGNOSIS — Z79.4 LONG TERM (CURRENT) USE OF INSULIN: ICD-10-CM

## 2021-08-19 DIAGNOSIS — M75.00 ADHESIVE CAPSULITIS OF UNSPECIFIED SHOULDER: Chronic | ICD-10-CM

## 2021-08-19 DIAGNOSIS — Z86.69 PERSONAL HISTORY OF OTHER DISEASES OF THE NERVOUS SYSTEM AND SENSE ORGANS: Chronic | ICD-10-CM

## 2021-08-19 DIAGNOSIS — L97.416 NON-PRESSURE CHRONIC ULCER OF RIGHT HEEL AND MIDFOOT WITH BONE INVOLVEMENT WITHOUT EVIDENCE OF NECROSIS: ICD-10-CM

## 2021-08-19 PROCEDURE — 82962 GLUCOSE BLOOD TEST: CPT

## 2021-08-19 PROCEDURE — 99183 HYPERBARIC OXYGEN THERAPY: CPT

## 2021-08-19 PROCEDURE — G0277: CPT

## 2021-08-19 NOTE — PROCEDURE
[Outpatient] : Outpatient [Ambulatory] : Patient is ambulatory. [Wheelchair] : wheelchair [THIS CHAMBER HAS BEEN CLEANED / DISINFECTED] : This chamber has been cleaned / disinfected according to local and hospital policy and procedure prior to this treatment. [Patient demonstrated and verbalized proper technique for using air break mask] : Patient demonstrated and verbalized proper technique for using air break mask [Patient educated on the risks of SMOKING prior to HBOT with understanding] : Patient educated on the risks of SMOKING prior to HBOT with understanding [Patient educated on the risks of CONSUMING ALCOHOL prior to HBOT with understanding] : Patient educated on the risks of CONSUMING ALCOHOL prior to HBOT with understanding [100% Cotton] : 100% cotton [Empty all pockets] : empty all pockets [No hair oils, wigs, hairpieces, pins] : no hair oils, wigs, hairpieces, pins  [Pre tx medications] : pre tx medications  [No make-up, creams] : no make-up, creams  [No jewelry] : no jewelry  [No matches, cigarettes, lighters] : no matches, cigarettes, lighters  [Hearing aid removed] : hearing aid removed [Dentures removed] : dentures removed [Ground bracelet on pt's wrist] : ground bracelet on pt's wrist  [Contacts removed] : contacts removed  [Remove nail polish] : remove nail polish  [No reading material] : no reading material  [Bra, undergarments removed] : bra, undergarments removed  [No contraindicated dressings] : no contraindicated dressings [Ground Wire - VISUAL Verification - Intact/Free of Obstruction] : Ground Wire - VISUAL Verification - Intact/Free of Obstruction  [Ground Continuity - Verified < 1 ohm w/ Wrist Strap Channing] : Ground Continuity - Verified < 1 ohm w/ Wrist Strap Channing [Number: ___] : Number: [unfilled] [Diagnosis: ___] : Diagnosis: [unfilled] [Cane] : cane [____] : Post-Dive: Time - [unfilled] [___] : Post-Dive: Value - [unfilled] mg/dL [Clear all fields] : clear all fields [] : No [FreeTextEntry3] : 90 [FreeTextEntry5] : 2696 [FreeTextEntry7] : 5583 [FreeTextEntry9] : 9133 [de-identified] : 108 min. [de-identified] : 6860

## 2021-08-19 NOTE — ADDENDUM
[FreeTextEntry1] : PT DESCENDED TO 2.0 EULALIO @ 1.75 PSI/MIN WITHOUT INCIDENT IN CHAMBER #1\par PT RESTING AT TX DEPTH WITH VISIBLE CHEST RISE AND FALL OBSERVED CHAMBERSIDE \par Transfer of Observation from  to T. \par The pt. was observed with visible chest motion and without incident for the duration of remaining HBOT.\par The pt. ascended from tx. depth to surface without incident.\par The pt. tolerated HBOT without incident. \par

## 2021-08-20 ENCOUNTER — OUTPATIENT (OUTPATIENT)
Dept: OUTPATIENT SERVICES | Facility: HOSPITAL | Age: 71
LOS: 1 days | Discharge: HOME | End: 2021-08-20
Payer: MEDICARE

## 2021-08-20 ENCOUNTER — APPOINTMENT (OUTPATIENT)
Dept: HYPERBARIC MEDICINE | Facility: HOSPITAL | Age: 71
End: 2021-08-20
Payer: MEDICARE

## 2021-08-20 VITALS
RESPIRATION RATE: 18 BRPM | HEART RATE: 72 BPM | OXYGEN SATURATION: 99 % | DIASTOLIC BLOOD PRESSURE: 79 MMHG | TEMPERATURE: 98.2 F | SYSTOLIC BLOOD PRESSURE: 160 MMHG

## 2021-08-20 VITALS
OXYGEN SATURATION: 100 % | SYSTOLIC BLOOD PRESSURE: 167 MMHG | DIASTOLIC BLOOD PRESSURE: 75 MMHG | HEART RATE: 64 BPM | RESPIRATION RATE: 18 BRPM | TEMPERATURE: 97.3 F

## 2021-08-20 DIAGNOSIS — M75.00 ADHESIVE CAPSULITIS OF UNSPECIFIED SHOULDER: Chronic | ICD-10-CM

## 2021-08-20 DIAGNOSIS — L97.416 NON-PRESSURE CHRONIC ULCER OF RIGHT HEEL AND MIDFOOT WITH BONE INVOLVEMENT WITHOUT EVIDENCE OF NECROSIS: ICD-10-CM

## 2021-08-20 DIAGNOSIS — E11.621 TYPE 2 DIABETES MELLITUS WITH FOOT ULCER: ICD-10-CM

## 2021-08-20 DIAGNOSIS — Z98.890 OTHER SPECIFIED POSTPROCEDURAL STATES: Chronic | ICD-10-CM

## 2021-08-20 DIAGNOSIS — Z79.4 LONG TERM (CURRENT) USE OF INSULIN: ICD-10-CM

## 2021-08-20 DIAGNOSIS — Z86.69 PERSONAL HISTORY OF OTHER DISEASES OF THE NERVOUS SYSTEM AND SENSE ORGANS: Chronic | ICD-10-CM

## 2021-08-20 DIAGNOSIS — S02.91XA UNSPECIFIED FRACTURE OF SKULL, INITIAL ENCOUNTER FOR CLOSED FRACTURE: Chronic | ICD-10-CM

## 2021-08-20 PROCEDURE — 99183 HYPERBARIC OXYGEN THERAPY: CPT

## 2021-08-20 PROCEDURE — G0277: CPT

## 2021-08-20 PROCEDURE — 82962 GLUCOSE BLOOD TEST: CPT

## 2021-08-20 NOTE — PROCEDURE
[Outpatient] : Outpatient [Ambulatory] : Patient is ambulatory. [Cane] : cane [Wheelchair] : wheelchair [THIS CHAMBER HAS BEEN CLEANED / DISINFECTED] : This chamber has been cleaned / disinfected according to local and hospital policy and procedure prior to this treatment. [Patient demonstrated and verbalized proper technique for using air break mask] : Patient demonstrated and verbalized proper technique for using air break mask [Patient educated on the risks of SMOKING prior to HBOT with understanding] : Patient educated on the risks of SMOKING prior to HBOT with understanding [Patient educated on the risks of CONSUMING ALCOHOL prior to HBOT with understanding] : Patient educated on the risks of CONSUMING ALCOHOL prior to HBOT with understanding [100% Cotton] : 100% cotton [Empty all pockets] : empty all pockets [No hair oils, wigs, hairpieces, pins] : no hair oils, wigs, hairpieces, pins  [Pre tx medications] : pre tx medications  [No make-up, creams] : no make-up, creams  [No jewelry] : no jewelry  [No matches, cigarettes, lighters] : no matches, cigarettes, lighters  [Hearing aid removed] : hearing aid removed [Dentures removed] : dentures removed [Ground bracelet on pt's wrist] : ground bracelet on pt's wrist  [Contacts removed] : contacts removed  [Remove nail polish] : remove nail polish  [No reading material] : no reading material  [Bra, undergarments removed] : bra, undergarments removed  [No contraindicated dressings] : no contraindicated dressings [Ground Wire - VISUAL Verification - Intact/Free of Obstruction] : Ground Wire - VISUAL Verification - Intact/Free of Obstruction  [Ground Continuity - Verified < 1 ohm w/ Wrist Strap Channing] : Ground Continuity - Verified < 1 ohm w/ Wrist Strap Channing [Number: ___] : Number: [unfilled] [Diagnosis: ___] : Diagnosis: [unfilled] [Clear all fields] : clear all fields [____] : Post-Dive: Time - [unfilled] [___] : Post-Dive: Value - [unfilled] mg/dL [] : No [FreeTextEntry3] : 74 [FreeTextEntry5] : 8332 [FreeTextEntry7] : 9813 [FreeTextEntry9] : 3614 [de-identified] : 2665 [de-identified] : 92 minutes

## 2021-08-20 NOTE — ADDENDUM
[FreeTextEntry1] : PT DESCENDED TO 2.0 EULALIO @ 1.75 PSI/MIN WITHOUT INCIDENT IN CHAMBER #1\par PT RESTING AT TX DEPTH WITH VISIBLE CHEST RISE AND FALL OBSERVED CHAMBER SIDE \par PT ADVISED WHILE IN DESCENT THAT HER GROUNDING BRACELET CAME OFF\par PT TOLD TO REMAIN STILL AND WAS BROUGHT UP WITHOUT INCIDENT IN CHAMBER #1 \par PT TOLERATED TX WELL\par \par

## 2021-08-23 ENCOUNTER — APPOINTMENT (OUTPATIENT)
Dept: HYPERBARIC MEDICINE | Facility: HOSPITAL | Age: 71
End: 2021-08-23

## 2021-08-24 ENCOUNTER — APPOINTMENT (OUTPATIENT)
Dept: HYPERBARIC MEDICINE | Facility: HOSPITAL | Age: 71
End: 2021-08-24

## 2021-08-25 ENCOUNTER — APPOINTMENT (OUTPATIENT)
Dept: HYPERBARIC MEDICINE | Facility: HOSPITAL | Age: 71
End: 2021-08-25

## 2021-08-26 ENCOUNTER — APPOINTMENT (OUTPATIENT)
Dept: HYPERBARIC MEDICINE | Facility: HOSPITAL | Age: 71
End: 2021-08-26

## 2021-08-27 ENCOUNTER — APPOINTMENT (OUTPATIENT)
Dept: HYPERBARIC MEDICINE | Facility: HOSPITAL | Age: 71
End: 2021-08-27
Payer: MEDICARE

## 2021-08-27 ENCOUNTER — APPOINTMENT (OUTPATIENT)
Dept: WOUND CARE | Facility: HOSPITAL | Age: 71
End: 2021-08-27
Payer: MEDICARE

## 2021-08-27 ENCOUNTER — OUTPATIENT (OUTPATIENT)
Dept: OUTPATIENT SERVICES | Facility: HOSPITAL | Age: 71
LOS: 1 days | Discharge: HOME | End: 2021-08-27
Payer: MEDICARE

## 2021-08-27 VITALS
OXYGEN SATURATION: 97 % | HEART RATE: 70 BPM | TEMPERATURE: 98.5 F | RESPIRATION RATE: 18 BRPM | BODY MASS INDEX: 41.02 KG/M2 | HEIGHT: 71 IN | SYSTOLIC BLOOD PRESSURE: 155 MMHG | DIASTOLIC BLOOD PRESSURE: 71 MMHG | WEIGHT: 293 LBS

## 2021-08-27 DIAGNOSIS — M75.00 ADHESIVE CAPSULITIS OF UNSPECIFIED SHOULDER: Chronic | ICD-10-CM

## 2021-08-27 DIAGNOSIS — E11.621 TYPE 2 DIABETES MELLITUS WITH FOOT ULCER: ICD-10-CM

## 2021-08-27 DIAGNOSIS — Z98.890 OTHER SPECIFIED POSTPROCEDURAL STATES: Chronic | ICD-10-CM

## 2021-08-27 DIAGNOSIS — Z86.69 PERSONAL HISTORY OF OTHER DISEASES OF THE NERVOUS SYSTEM AND SENSE ORGANS: Chronic | ICD-10-CM

## 2021-08-27 DIAGNOSIS — S02.91XA UNSPECIFIED FRACTURE OF SKULL, INITIAL ENCOUNTER FOR CLOSED FRACTURE: Chronic | ICD-10-CM

## 2021-08-27 PROCEDURE — G0463: CPT

## 2021-08-27 PROCEDURE — 99213 OFFICE O/P EST LOW 20 MIN: CPT

## 2021-09-01 DIAGNOSIS — E11.40 TYPE 2 DIABETES MELLITUS WITH DIABETIC NEUROPATHY, UNSPECIFIED: ICD-10-CM

## 2021-09-01 DIAGNOSIS — Z83.3 FAMILY HISTORY OF DIABETES MELLITUS: ICD-10-CM

## 2021-09-01 DIAGNOSIS — Z79.82 LONG TERM (CURRENT) USE OF ASPIRIN: ICD-10-CM

## 2021-09-01 DIAGNOSIS — Z82.5 FAMILY HISTORY OF ASTHMA AND OTHER CHRONIC LOWER RESPIRATORY DISEASES: ICD-10-CM

## 2021-09-01 DIAGNOSIS — Z79.4 LONG TERM (CURRENT) USE OF INSULIN: ICD-10-CM

## 2021-09-01 DIAGNOSIS — Z88.0 ALLERGY STATUS TO PENICILLIN: ICD-10-CM

## 2021-09-01 DIAGNOSIS — Z98.49 CATARACT EXTRACTION STATUS, UNSPECIFIED EYE: ICD-10-CM

## 2021-09-01 DIAGNOSIS — Z79.899 OTHER LONG TERM (CURRENT) DRUG THERAPY: ICD-10-CM

## 2021-09-01 DIAGNOSIS — L97.416 NON-PRESSURE CHRONIC ULCER OF RIGHT HEEL AND MIDFOOT WITH BONE INVOLVEMENT WITHOUT EVIDENCE OF NECROSIS: ICD-10-CM

## 2021-09-01 DIAGNOSIS — E11.621 TYPE 2 DIABETES MELLITUS WITH FOOT ULCER: ICD-10-CM

## 2021-09-01 DIAGNOSIS — Z98.890 OTHER SPECIFIED POSTPROCEDURAL STATES: ICD-10-CM

## 2021-09-01 NOTE — PHYSICAL EXAM
[2+] : right 2+ [Ankle Swelling (On Exam)] : not present [Varicose Veins Of Lower Extremities] : not present [] : not present [Skin Ulcer] : ulcer [Alert] : alert [Oriented to Person] : oriented to person [Oriented to Place] : oriented to place [Oriented to Time] : oriented to time [Calm] : calm [de-identified] : A&Ox3, NAD [de-identified] : HTN, diabetic small vessel disease  [de-identified] : morbid obesity  [de-identified] : s/p right foot sesamoidectomy, 3/5 strength in all quadrants bilaterally [de-identified] : right foot plantar diabetic ulcer  currently closed [de-identified] : Diminished light touch sensation bilaterally [FreeTextEntry1] : Plantar Foot 1st Met [de-identified] : No treatment required  [de-identified] : Cleansed with Normal saline\par  [FreeTextEntry7] : Right Hallux- dry skin area/ no open wound/ skin intact- seen by Dr Rangel [de-identified] : No treatment [de-identified] : Cleansed with Normal saline\par  [TWNoteComboBox1] : Right [TWNoteComboBox4] : None

## 2021-09-01 NOTE — ASSESSMENT
[Verbal] : Verbal [Demo] : Demo [Patient] : Patient [Good - alert, interested, motivated] : Good - alert, interested, motivated [Verbalizes knowledge/Understanding] : Verbalizes knowledge/understanding [Dressing changes] : dressing changes [Foot Care] : foot care [Skin Care] : skin care [Pressure relief] : pressure relief [Signs and symptoms of infection] : sign and symptoms of infection [How and When to Call] : how and when to call [Hyperbaric Therapy] : hyperbaric therapy [Off-loading] : off-loading [Patient responsibility to plan of care] : patient responsibility to plan of care [] : Yes [Stable] : stable [Home] : Home [Wheelchair] : Wheelchair [FreeTextEntry2] : Maintain optimal skin integrity\par F/U 1 month \par  [FreeTextEntry4] : F/U 1 month

## 2021-09-01 NOTE — HISTORY OF PRESENT ILLNESS
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

## 2021-09-01 NOTE — REVIEW OF SYSTEMS
[Fever] : no fever [Chills] : no chills [Eye Pain] : no eye pain [Earache] : no earache [Loss Of Hearing] : no hearing loss [Chest Pain] : no chest pain [Shortness Of Breath] : no shortness of breath [Cough] : no cough [Abdominal Pain] : no abdominal pain [Joint Stiffness] : joint stiffness [Skin Wound] : skin wound [Anxiety] : no anxiety [Easy Bleeding] : no tendency for easy bleeding [FreeTextEntry2] : morbid obesity [FreeTextEntry5] : HTN , HLD [FreeTextEntry9] : morbid obesity  [de-identified] : right foot plantar DFU 3  closed , 1st metatarsal  [de-identified] : diabetic neuropathy [de-identified] : KENNY

## 2021-09-01 NOTE — PLAN
[FreeTextEntry1] : continue off loading and observation , PTR 2 weeks  Spent 20 minutes for patient care and medical decision making.\par

## 2021-10-01 ENCOUNTER — OUTPATIENT (OUTPATIENT)
Dept: OUTPATIENT SERVICES | Facility: HOSPITAL | Age: 71
LOS: 1 days | Discharge: HOME | End: 2021-10-01
Payer: MEDICARE

## 2021-10-01 ENCOUNTER — APPOINTMENT (OUTPATIENT)
Dept: WOUND CARE | Facility: HOSPITAL | Age: 71
End: 2021-10-01
Payer: MEDICARE

## 2021-10-01 VITALS
OXYGEN SATURATION: 99 % | RESPIRATION RATE: 20 BRPM | SYSTOLIC BLOOD PRESSURE: 154 MMHG | HEIGHT: 71 IN | BODY MASS INDEX: 41.02 KG/M2 | DIASTOLIC BLOOD PRESSURE: 69 MMHG | HEART RATE: 63 BPM | TEMPERATURE: 97 F | WEIGHT: 293 LBS

## 2021-10-01 DIAGNOSIS — Z98.890 OTHER SPECIFIED POSTPROCEDURAL STATES: Chronic | ICD-10-CM

## 2021-10-01 DIAGNOSIS — E11.621 TYPE 2 DIABETES MELLITUS WITH FOOT ULCER: ICD-10-CM

## 2021-10-01 DIAGNOSIS — Z86.69 PERSONAL HISTORY OF OTHER DISEASES OF THE NERVOUS SYSTEM AND SENSE ORGANS: Chronic | ICD-10-CM

## 2021-10-01 DIAGNOSIS — M75.00 ADHESIVE CAPSULITIS OF UNSPECIFIED SHOULDER: Chronic | ICD-10-CM

## 2021-10-01 DIAGNOSIS — S02.91XA UNSPECIFIED FRACTURE OF SKULL, INITIAL ENCOUNTER FOR CLOSED FRACTURE: Chronic | ICD-10-CM

## 2021-10-01 PROCEDURE — 99213 OFFICE O/P EST LOW 20 MIN: CPT

## 2021-10-01 PROCEDURE — G0463: CPT

## 2021-10-02 DIAGNOSIS — E11.40 TYPE 2 DIABETES MELLITUS WITH DIABETIC NEUROPATHY, UNSPECIFIED: ICD-10-CM

## 2021-10-02 DIAGNOSIS — L97.416 NON-PRESSURE CHRONIC ULCER OF RIGHT HEEL AND MIDFOOT WITH BONE INVOLVEMENT WITHOUT EVIDENCE OF NECROSIS: ICD-10-CM

## 2021-10-02 DIAGNOSIS — Z79.82 LONG TERM (CURRENT) USE OF ASPIRIN: ICD-10-CM

## 2021-10-02 DIAGNOSIS — Z79.899 OTHER LONG TERM (CURRENT) DRUG THERAPY: ICD-10-CM

## 2021-10-02 DIAGNOSIS — Z98.49 CATARACT EXTRACTION STATUS, UNSPECIFIED EYE: ICD-10-CM

## 2021-10-02 DIAGNOSIS — E66.01 MORBID (SEVERE) OBESITY DUE TO EXCESS CALORIES: ICD-10-CM

## 2021-10-02 DIAGNOSIS — Z88.0 ALLERGY STATUS TO PENICILLIN: ICD-10-CM

## 2021-10-02 DIAGNOSIS — Z98.890 OTHER SPECIFIED POSTPROCEDURAL STATES: ICD-10-CM

## 2021-10-02 DIAGNOSIS — Z79.4 LONG TERM (CURRENT) USE OF INSULIN: ICD-10-CM

## 2021-10-02 DIAGNOSIS — E11.621 TYPE 2 DIABETES MELLITUS WITH FOOT ULCER: ICD-10-CM

## 2021-10-02 DIAGNOSIS — Z83.3 FAMILY HISTORY OF DIABETES MELLITUS: ICD-10-CM

## 2021-10-02 DIAGNOSIS — Z82.5 FAMILY HISTORY OF ASTHMA AND OTHER CHRONIC LOWER RESPIRATORY DISEASES: ICD-10-CM

## 2021-10-02 NOTE — HISTORY OF PRESENT ILLNESS
[FreeTextEntry1] : Closed DFU plantar 1st metatarsal , with underlying chronic OM , stable healed , no soi

## 2021-10-02 NOTE — PHYSICAL EXAM
[2+] : left 2+ [Ankle Swelling (On Exam)] : not present [Varicose Veins Of Lower Extremities] : not present [] : not present [Skin Ulcer] : ulcer [Alert] : alert [Oriented to Person] : oriented to person [Oriented to Place] : oriented to place [Oriented to Time] : oriented to time [Calm] : calm [de-identified] : A&Ox3, NAD [de-identified] : HTN, diabetic small vessel disease  [de-identified] : morbid obesity  [de-identified] : s/p right foot sesamoidectomy, 3/5 strength in all quadrants bilaterally [de-identified] : right foot plantar diabetic ulcer  currently closed [de-identified] : Diminished light touch sensation bilaterally [FreeTextEntry1] : Plantar Foot 1st Met [de-identified] : No treatment required  [de-identified] : Cleansed with Normal saline\par  [de-identified] : No treatment [FreeTextEntry7] : Right Hallux- dry skin area/ no open wound/ skin intact- seen by Dr Rangel [de-identified] : Cleansed with Normal saline\par  [TWNoteComboBox1] : Right [TWNoteComboBox4] : None

## 2021-10-02 NOTE — REVIEW OF SYSTEMS
[Fever] : no fever [Chills] : no chills [Eye Pain] : no eye pain [Earache] : no earache [Loss Of Hearing] : no hearing loss [Chest Pain] : no chest pain [Shortness Of Breath] : no shortness of breath [Cough] : no cough [Abdominal Pain] : no abdominal pain [Joint Stiffness] : joint stiffness [Skin Wound] : skin wound [Anxiety] : no anxiety [Easy Bleeding] : no tendency for easy bleeding [FreeTextEntry2] : morbid obesity [FreeTextEntry5] : HTN , HLD [FreeTextEntry9] : morbid obesity  [de-identified] : right foot plantar DFU 3  closed , 1st metatarsal  [de-identified] : diabetic neuropathy [de-identified] : KENNY

## 2021-10-02 NOTE — ASSESSMENT
[Verbal] : Verbal [Demo] : Demo [Patient] : Patient [Good - alert, interested, motivated] : Good - alert, interested, motivated [Verbalizes knowledge/Understanding] : Verbalizes knowledge/understanding [Foot Care] : foot care [Skin Care] : skin care [Signs and symptoms of infection] : sign and symptoms of infection [How and When to Call] : how and when to call [Patient responsibility to plan of care] : patient responsibility to plan of care [] : Yes [Stable] : stable [Home] : Home [Wheelchair] : Wheelchair [Discharge Planning] : discharge planning [FreeTextEntry4] : Pt Discharged from C  [FreeTextEntry2] : Maintain skin integrity\par \par

## 2022-02-14 NOTE — ASSESSMENT
[No change from previous assessment] : No change from previous assessment [Patient prepared for dive] : Patient prepared for dive [Patient undergoing HBO treatment for __________] : Patient undergoing HBO treatment for [unfilled] [Patient descended without problem for 9 minutes] : Patient descended without problem for 9 minutes [No dizziness or thirst] :  No dizziness or thirst [No ear problems] : No ear problems [Vital signs stable] : Vital signs stable [Tolerating dive well] : Tolerating dive well [No Chest Pain, shortness of breath] : No Chest Pain, shortness of breath [Respiratory Rate Stable] : Respiratory Rate Stable [No chest pain, shortness of breath, or ear pain] :  No chest pain, shortness of breath, or ear pain  [Tolerated Ascent well] : Tolerated Ascent well [Vital Signs stable] : Vital Signs stable [A physician was present throughout the entire HBOT] : A physician was present throughout the entire HBOT [No] : No Implemented All Fall with Harm Risk Interventions:  [place] to call system. Call bell, personal items and telephone within reach. Instruct patient to call for assistance. Room bathroom lighting operational. Non-slip footwear when patient is off stretcher. Physically safe environment: no spills, clutter or unnecessary equipment. Stretcher in lowest position, wheels locked, appropriate side rails in place. Provide visual cue, wrist band, yellow gown, etc. Monitor gait and stability. Monitor for mental status changes and reorient to person, place, and time. Review medications for side effects contributing to fall risk. Reinforce activity limits and safety measures with patient and family. Provide visual clues: red socks. [Clinically Stable] : Clinically stable [Continue Treatment Plan] : Continue treatment plan [FreeTextEntry2] : none

## 2022-04-01 ENCOUNTER — INPATIENT (INPATIENT)
Facility: HOSPITAL | Age: 72
LOS: 2 days | Discharge: HOME | DRG: 291 | End: 2022-04-04
Attending: STUDENT IN AN ORGANIZED HEALTH CARE EDUCATION/TRAINING PROGRAM | Admitting: STUDENT IN AN ORGANIZED HEALTH CARE EDUCATION/TRAINING PROGRAM
Payer: MEDICARE

## 2022-04-01 VITALS
TEMPERATURE: 98 F | WEIGHT: 293 LBS | HEIGHT: 71 IN | RESPIRATION RATE: 20 BRPM | DIASTOLIC BLOOD PRESSURE: 66 MMHG | OXYGEN SATURATION: 96 % | HEART RATE: 68 BPM | SYSTOLIC BLOOD PRESSURE: 136 MMHG

## 2022-04-01 DIAGNOSIS — Z98.890 OTHER SPECIFIED POSTPROCEDURAL STATES: Chronic | ICD-10-CM

## 2022-04-01 DIAGNOSIS — I50.9 HEART FAILURE, UNSPECIFIED: ICD-10-CM

## 2022-04-01 DIAGNOSIS — S02.91XA UNSPECIFIED FRACTURE OF SKULL, INITIAL ENCOUNTER FOR CLOSED FRACTURE: Chronic | ICD-10-CM

## 2022-04-01 DIAGNOSIS — I10 ESSENTIAL (PRIMARY) HYPERTENSION: ICD-10-CM

## 2022-04-01 DIAGNOSIS — Z86.69 PERSONAL HISTORY OF OTHER DISEASES OF THE NERVOUS SYSTEM AND SENSE ORGANS: Chronic | ICD-10-CM

## 2022-04-01 DIAGNOSIS — Z29.9 ENCOUNTER FOR PROPHYLACTIC MEASURES, UNSPECIFIED: ICD-10-CM

## 2022-04-01 DIAGNOSIS — M75.00 ADHESIVE CAPSULITIS OF UNSPECIFIED SHOULDER: Chronic | ICD-10-CM

## 2022-04-01 DIAGNOSIS — I89.0 LYMPHEDEMA, NOT ELSEWHERE CLASSIFIED: ICD-10-CM

## 2022-04-01 DIAGNOSIS — R05.9 COUGH, UNSPECIFIED: ICD-10-CM

## 2022-04-01 DIAGNOSIS — E11.9 TYPE 2 DIABETES MELLITUS WITHOUT COMPLICATIONS: ICD-10-CM

## 2022-04-01 LAB
ALBUMIN SERPL ELPH-MCNC: 3.5 G/DL — SIGNIFICANT CHANGE UP (ref 3.3–5)
ALP SERPL-CCNC: 78 U/L — SIGNIFICANT CHANGE UP (ref 40–120)
ALT FLD-CCNC: 22 U/L — SIGNIFICANT CHANGE UP (ref 12–78)
ANION GAP SERPL CALC-SCNC: 6 MMOL/L — SIGNIFICANT CHANGE UP (ref 5–17)
AST SERPL-CCNC: 29 U/L — SIGNIFICANT CHANGE UP (ref 15–37)
BASOPHILS # BLD AUTO: 0.07 K/UL — SIGNIFICANT CHANGE UP (ref 0–0.2)
BASOPHILS NFR BLD AUTO: 0.9 % — SIGNIFICANT CHANGE UP (ref 0–2)
BILIRUB SERPL-MCNC: 0.9 MG/DL — SIGNIFICANT CHANGE UP (ref 0.2–1.2)
BUN SERPL-MCNC: 14 MG/DL — SIGNIFICANT CHANGE UP (ref 7–23)
CALCIUM SERPL-MCNC: 8.6 MG/DL — SIGNIFICANT CHANGE UP (ref 8.5–10.1)
CHLORIDE SERPL-SCNC: 103 MMOL/L — SIGNIFICANT CHANGE UP (ref 96–108)
CO2 SERPL-SCNC: 30 MMOL/L — SIGNIFICANT CHANGE UP (ref 22–31)
CREAT SERPL-MCNC: 0.99 MG/DL — SIGNIFICANT CHANGE UP (ref 0.5–1.3)
EGFR: 61 ML/MIN/1.73M2 — SIGNIFICANT CHANGE UP
EOSINOPHIL # BLD AUTO: 0.06 K/UL — SIGNIFICANT CHANGE UP (ref 0–0.5)
EOSINOPHIL NFR BLD AUTO: 0.8 % — SIGNIFICANT CHANGE UP (ref 0–6)
FLUAV AG NPH QL: SIGNIFICANT CHANGE UP
FLUBV AG NPH QL: SIGNIFICANT CHANGE UP
GLUCOSE SERPL-MCNC: 107 MG/DL — HIGH (ref 70–99)
HCT VFR BLD CALC: 39 % — SIGNIFICANT CHANGE UP (ref 34.5–45)
HGB BLD-MCNC: 12.4 G/DL — SIGNIFICANT CHANGE UP (ref 11.5–15.5)
IMM GRANULOCYTES NFR BLD AUTO: 0.7 % — SIGNIFICANT CHANGE UP (ref 0–1.5)
LYMPHOCYTES # BLD AUTO: 0.8 K/UL — LOW (ref 1–3.3)
LYMPHOCYTES # BLD AUTO: 10.7 % — LOW (ref 13–44)
MCHC RBC-ENTMCNC: 28.1 PG — SIGNIFICANT CHANGE UP (ref 27–34)
MCHC RBC-ENTMCNC: 31.8 GM/DL — LOW (ref 32–36)
MCV RBC AUTO: 88.4 FL — SIGNIFICANT CHANGE UP (ref 80–100)
MONOCYTES # BLD AUTO: 0.39 K/UL — SIGNIFICANT CHANGE UP (ref 0–0.9)
MONOCYTES NFR BLD AUTO: 5.2 % — SIGNIFICANT CHANGE UP (ref 2–14)
NEUTROPHILS # BLD AUTO: 6.1 K/UL — SIGNIFICANT CHANGE UP (ref 1.8–7.4)
NEUTROPHILS NFR BLD AUTO: 81.7 % — HIGH (ref 43–77)
NRBC # BLD: 0 /100 WBCS — SIGNIFICANT CHANGE UP (ref 0–0)
NT-PROBNP SERPL-SCNC: HIGH PG/ML (ref 0–125)
PLATELET # BLD AUTO: 219 K/UL — SIGNIFICANT CHANGE UP (ref 150–400)
POTASSIUM SERPL-MCNC: 4.6 MMOL/L — SIGNIFICANT CHANGE UP (ref 3.5–5.3)
POTASSIUM SERPL-SCNC: 4.6 MMOL/L — SIGNIFICANT CHANGE UP (ref 3.5–5.3)
PROT SERPL-MCNC: 7.8 G/DL — SIGNIFICANT CHANGE UP (ref 6–8.3)
RBC # BLD: 4.41 M/UL — SIGNIFICANT CHANGE UP (ref 3.8–5.2)
RBC # FLD: 14.8 % — HIGH (ref 10.3–14.5)
RSV RNA NPH QL NAA+NON-PROBE: SIGNIFICANT CHANGE UP
SARS-COV-2 RNA SPEC QL NAA+PROBE: SIGNIFICANT CHANGE UP
SODIUM SERPL-SCNC: 139 MMOL/L — SIGNIFICANT CHANGE UP (ref 135–145)
TROPONIN I, HIGH SENSITIVITY RESULT: 20.8 NG/L — SIGNIFICANT CHANGE UP
WBC # BLD: 7.47 K/UL — SIGNIFICANT CHANGE UP (ref 3.8–10.5)
WBC # FLD AUTO: 7.47 K/UL — SIGNIFICANT CHANGE UP (ref 3.8–10.5)

## 2022-04-01 PROCEDURE — 99223 1ST HOSP IP/OBS HIGH 75: CPT | Mod: GC

## 2022-04-01 PROCEDURE — 71046 X-RAY EXAM CHEST 2 VIEWS: CPT | Mod: 26

## 2022-04-01 PROCEDURE — 99223 1ST HOSP IP/OBS HIGH 75: CPT

## 2022-04-01 PROCEDURE — 99285 EMERGENCY DEPT VISIT HI MDM: CPT | Mod: FS

## 2022-04-01 PROCEDURE — 93010 ELECTROCARDIOGRAM REPORT: CPT

## 2022-04-01 RX ORDER — METOPROLOL TARTRATE 50 MG
50 TABLET ORAL DAILY
Refills: 0 | Status: DISCONTINUED | OUTPATIENT
Start: 2022-04-01 | End: 2022-04-02

## 2022-04-01 RX ORDER — INSULIN LISPRO 100/ML
VIAL (ML) SUBCUTANEOUS
Refills: 0 | Status: DISCONTINUED | OUTPATIENT
Start: 2022-04-01 | End: 2022-04-04

## 2022-04-01 RX ORDER — DEXTROSE 50 % IN WATER 50 %
12.5 SYRINGE (ML) INTRAVENOUS ONCE
Refills: 0 | Status: DISCONTINUED | OUTPATIENT
Start: 2022-04-01 | End: 2022-04-04

## 2022-04-01 RX ORDER — SODIUM CHLORIDE 9 MG/ML
1000 INJECTION, SOLUTION INTRAVENOUS
Refills: 0 | Status: DISCONTINUED | OUTPATIENT
Start: 2022-04-01 | End: 2022-04-04

## 2022-04-01 RX ORDER — ENOXAPARIN SODIUM 100 MG/ML
40 INJECTION SUBCUTANEOUS EVERY 12 HOURS
Refills: 0 | Status: DISCONTINUED | OUTPATIENT
Start: 2022-04-01 | End: 2022-04-04

## 2022-04-01 RX ORDER — ENOXAPARIN SODIUM 100 MG/ML
40 INJECTION SUBCUTANEOUS EVERY 24 HOURS
Refills: 0 | Status: DISCONTINUED | OUTPATIENT
Start: 2022-04-01 | End: 2022-04-01

## 2022-04-01 RX ORDER — ASPIRIN/CALCIUM CARB/MAGNESIUM 324 MG
81 TABLET ORAL DAILY
Refills: 0 | Status: DISCONTINUED | OUTPATIENT
Start: 2022-04-01 | End: 2022-04-04

## 2022-04-01 RX ORDER — INSULIN GLARGINE 100 [IU]/ML
20 INJECTION, SOLUTION SUBCUTANEOUS AT BEDTIME
Refills: 0 | Status: DISCONTINUED | OUTPATIENT
Start: 2022-04-01 | End: 2022-04-04

## 2022-04-01 RX ORDER — FUROSEMIDE 40 MG
40 TABLET ORAL DAILY
Refills: 0 | Status: DISCONTINUED | OUTPATIENT
Start: 2022-04-01 | End: 2022-04-04

## 2022-04-01 RX ORDER — INSULIN LISPRO 100/ML
VIAL (ML) SUBCUTANEOUS AT BEDTIME
Refills: 0 | Status: DISCONTINUED | OUTPATIENT
Start: 2022-04-01 | End: 2022-04-04

## 2022-04-01 RX ORDER — FUROSEMIDE 40 MG
40 TABLET ORAL ONCE
Refills: 0 | Status: COMPLETED | OUTPATIENT
Start: 2022-04-01 | End: 2022-04-01

## 2022-04-01 RX ORDER — LANOLIN ALCOHOL/MO/W.PET/CERES
3 CREAM (GRAM) TOPICAL AT BEDTIME
Refills: 0 | Status: DISCONTINUED | OUTPATIENT
Start: 2022-04-01 | End: 2022-04-04

## 2022-04-01 RX ORDER — DEXTROSE 50 % IN WATER 50 %
25 SYRINGE (ML) INTRAVENOUS ONCE
Refills: 0 | Status: DISCONTINUED | OUTPATIENT
Start: 2022-04-01 | End: 2022-04-04

## 2022-04-01 RX ORDER — GLUCAGON INJECTION, SOLUTION 0.5 MG/.1ML
1 INJECTION, SOLUTION SUBCUTANEOUS ONCE
Refills: 0 | Status: DISCONTINUED | OUTPATIENT
Start: 2022-04-01 | End: 2022-04-04

## 2022-04-01 RX ORDER — DEXTROSE 50 % IN WATER 50 %
15 SYRINGE (ML) INTRAVENOUS ONCE
Refills: 0 | Status: DISCONTINUED | OUTPATIENT
Start: 2022-04-01 | End: 2022-04-04

## 2022-04-01 RX ORDER — ATORVASTATIN CALCIUM 80 MG/1
40 TABLET, FILM COATED ORAL AT BEDTIME
Refills: 0 | Status: DISCONTINUED | OUTPATIENT
Start: 2022-04-01 | End: 2022-04-04

## 2022-04-01 RX ADMIN — INSULIN GLARGINE 20 UNIT(S): 100 INJECTION, SOLUTION SUBCUTANEOUS at 23:32

## 2022-04-01 RX ADMIN — Medication 40 MILLIGRAM(S): at 18:43

## 2022-04-01 RX ADMIN — ATORVASTATIN CALCIUM 40 MILLIGRAM(S): 80 TABLET, FILM COATED ORAL at 22:59

## 2022-04-01 NOTE — H&P ADULT - NSHPSOCIALHISTORY_GEN_ALL_CORE
Lives with partner  Ambulates with cane  Performs ADLS independently  Never smoker  Denies etoh or illicit drug use

## 2022-04-01 NOTE — ED ADULT NURSE NOTE - NSICDXFAMILYHX_GEN_ALL_CORE_FT
FAMILY HISTORY:  No pertinent family history in first degree relatives     FAMILY HISTORY:  Family history of CVA, mom, age 57

## 2022-04-01 NOTE — H&P ADULT - ATTENDING COMMENTS
72 y/o F presents with SOB and cough.     ER course: VSS. Labs: glucose 107, BNP 21975. COVID, influenza A/B, RSV negative.     EKG:     Imaging:   - CXR: increased vascular congestion bilaterally, small left pleural effusion, no consolidation, no pneumothorax (personally reviewed).     Pt was given 40 mg IVP lasix. She is being admitted to telemetry for further management.     72 y/o F presents with SOB, cough     1. Acute CHF exacerbation   - Admit to telemetry   - BNP 71565, pt is fluid overloaded on exam   - Ordered ECHO   - c/w lasix 40 mg IVP daily   - Strict I+Os, daily weights  - Keep K>4, Mg>2   - Supplemental O2 to keep SpO2 >92%   - 1st troponin negative, trend 2 more troponins   - Cardiology consult - Dr. Oconnor    2. Hyperglycemia   - Glucose 107, monitor closely     3. History of HTN, DM2, LBBB (w/ neg stress, TTE 6/21 w/ mild AS), lymphedema  - c/w home medications   - Lantus dose decreased as pt will be on DASH diet/strict glycemic control in hospital     DVT ppx: Lovenox 40 mg subcutaneous BID 72 y/o F presents with SOB and cough.     ICU Vital Signs Last 24 Hrs  T(F): 98 (01 Apr 2022 21:05), Max: 98.4 (01 Apr 2022 14:06), HR: 65 (01 Apr 2022 21:05) (65 - 72), BP: 166/72 (01 Apr 2022 21:05) (136/66 - 180/77)  RR: 19 (01 Apr 2022 21:05) (18 - 20), SpO2: 99% (01 Apr 2022 21:05) (96% - 99%)    General: Awake and alert, cooperative with exam. No acute distress.   Skin: Warm, dry, and pink.   Eyes: Pupils equal and reactive to light. Extraocular eye movements intact. No conjunctival injection, discharge, or scleral icterus.   HEENT: Atraumatic, normocephalic. Moist mucus membranes.   Cardiology: Normal S1, S2. Systolic ejection murmur. Regular rate and rhythm.   Respiratory: Lungs clear to ascultation bilaterally. Good air exchange. No wheezes, rales, or rhonchi. Normal chest expansion.   Gastrointestinal: Positive bowel sounds. Soft, non-tender, non-distended. No guarding, rigidity, or rebound tenderness. No hepatosplenomegaly.   Musculoskeletal: 5/5 motor strength in all extremities. Normal range of motion.   Extremities: 1+ peripheral edema bilaterally, lymphedema, no tenderness on palpation of calf. Dorsalis pedis pulses 2+ bilaterally.   Neurological: A+Ox3 (person, place, and time). Cranial nerves 2-12 intact. Normal speech. No facial droop. No focal neurological deficits.   Psychiatric: Normal affect. Normal mood.     ER course: VSS. Labs: glucose 107, BNP 64734. COVID, influenza A/B, RSV negative. EKG: NSR with HR 63 bpm, 1st degree AV block ( ms),  ms, LBBB unchanged from prior EKG (personally reviewed). CXR: increased vascular congestion bilaterally, small left pleural effusion, no consolidation, no pneumothorax (personally reviewed).     Pt was given 40 mg IVP lasix. She is being admitted to telemetry for further management.     72 y/o F presents with SOB, cough     1. Acute CHF exacerbation   - Admit to telemetry   - BNP 76060, pt is fluid overloaded on exam   - Ordered ECHO   - c/w lasix 40 mg IVP daily   - Strict I+Os, daily weights  - Keep K>4, Mg>2   - Supplemental O2 to keep SpO2 >92%   - 1st troponin negative, trend 2 more troponins   - Cardiology consult - Dr. Oconnor    2. Hyperglycemia   - Glucose 107, monitor closely     3. History of HTN, DM2, LBBB (w/ neg stress, TTE 6/21 w/ mild AS), lymphedema  - c/w home medications   - Lantus dose decreased as pt will be on DASH diet/strict glycemic control in hospital     DVT ppx: Lovenox 40 mg subcutaneous BID  Code status: Full Code (pt agrees to chest compressions and intubation if required).

## 2022-04-01 NOTE — H&P ADULT - NSICDXPASTMEDICALHX_GEN_ALL_CORE_FT
PAST MEDICAL HISTORY:  Diabetes     H/O left bundle branch block     History of left bundle branch block (LBBB)     Hypertension     Lymphedema

## 2022-04-01 NOTE — CONSULT NOTE ADULT - ATTENDING COMMENTS
Patient seen and examined on the date of service, 4/1/2022.  Acute diastolic CHF, decompensated.  Needs IV Lasix to mainain neg bal.  Monitor I, O, K, creatinine.  Keep neg.  CHeck echo.  Continue home meds.  To follow closely. Supp oxygen as needed.

## 2022-04-01 NOTE — CONSULT NOTE ADULT - SUBJECTIVE AND OBJECTIVE BOX
[facility]         Shyanne Mueller, Goran, Howard, Cherise, Alecia Gonzalez        [phone] (office)    Reason for Consult: shortness of breath     Interval HPI: Patient seen and examined at bedside. No acute events overnight.     HPI: Patient is 72 yo F PMHx HTN, DM2,  LBBB, chronic lymphedema presents to ED with sob and cough and chronic b/l lymph edema. Reports      PAST MEDICAL & SURGICAL HISTORY:  Hypertension    Diabetes    Lymphedema    H/O left bundle branch block    History of left bundle branch block (LBBB)    H/O cataract  2020    History of surgical removal of meniscus of knee  left in 1971    Frozen shoulder  2000    H/O Achilles tendon repair  lengthened bilaterally, 2000    Fractured skull  1968        SOCIAL HISTORY: No active tobacco, alcohol or illicit drug use    FAMILY HISTORY:      Home Medications:  aspirin 81 mg oral tablet, chewable: 1 tab(s) orally once a day (01 Apr 2022 14:10)  atorvastatin 40 mg oral tablet: 1 tab(s) orally once a day (01 Apr 2022 14:10)  enalapril 20 mg oral tablet: 1 tab(s) orally once a day (01 Apr 2022 14:10)  furosemide 40 mg oral tablet: 1 tab(s) orally once a day (01 Apr 2022 14:10)  Klor-Con 10 mEq oral tablet, extended release: 1 tab(s) orally once a day (01 Apr 2022 14:10)  Lantus: 35 unit(s) subcutaneous once a day (at bedtime) (01 Apr 2022 14:10)  Metoprolol Succinate ER 50 mg oral tablet, extended release: 1 tab(s) orally once a day (12 Jan 2021 21:17)  NovoLOG: patient&#x27;s own sliding scale (12 Jan 2021 21:17)      MEDICATIONS  (STANDING):  furosemide   Injectable 40 milliGRAM(s) IV Push Once    MEDICATIONS  (PRN):      Allergies    penicillins (Hives)    Intolerances        REVIEW OF SYSTEMS: Negative except as per HPI.    VITAL SIGNS:   Vital Signs Last 24 Hrs  T(C): 36.9 (01 Apr 2022 14:06), Max: 36.9 (01 Apr 2022 14:06)  T(F): 98.4 (01 Apr 2022 14:06), Max: 98.4 (01 Apr 2022 14:06)  HR: 68 (01 Apr 2022 14:06) (68 - 68)  BP: 136/66 (01 Apr 2022 14:06) (136/66 - 136/66)  BP(mean): --  RR: 20 (01 Apr 2022 14:06) (20 - 20)  SpO2: 96% (01 Apr 2022 14:06) (96% - 96%)    I&O's Summary      PHYSICAL EXAM:  Constitutional: intermittent dyspnea  HEENT NC/AT, moist mucous membranes  Pulmonary: Non-labored, breath sounds are clear bilaterally, no wheezing, rales or rhonchi  Cardiovascular: +S1, S2, RRR, + murmur  Gastrointestinal: Soft, nontender, nondistended, normoactive bowel sounds  Extremities: No peripheral edema   Neurological: Alert, strength and sensitivity are grossly intact  Skin: No obvious lesions/rashes  Psych: Mood & affect appropriate    LABS: All Labs Reviewed:                        12.4   7.47  )-----------( 219      ( 01 Apr 2022 16:00 )             39.0     01 Apr 2022 16:00    139    |  103    |  14     ----------------------------<  107    4.6     |  30     |  0.99     Ca    8.6        01 Apr 2022 16:00    TPro  7.8    /  Alb  3.5    /  TBili  0.9    /  DBili  x      /  AST  29     /  ALT  22     /  AlkPhos  78     01 Apr 2022 16:00           [facility]         Shyanne Mueller, Goran, Howard, Cherise, Alecia Gonzalez        [phone] (office)    Reason for Consult: shortness of breath     Interval HPI: Patient seen and examined at bedside. No acute events overnight.     HPI: Patient is 72 yo F PMHx HTN, DM2, LBBB, chronic lymphedema presents to ED with sob and cough and chronic b/l lymph edema. Reports shortness of breath with cough with white frothy sputum with associated dyspnea on exertion x 1 week. States that she takes furosemide 40mg for leg edema. Denies chest pain, vomiting, abdominal pain, increased leg swelling or pain  PCP: Dr. Patterson  CARDIOLOGIST:  Dr. Lon Latham      PAST MEDICAL & SURGICAL HISTORY:  Hypertension    Diabetes    Lymphedema    H/O left bundle branch block    History of left bundle branch block (LBBB)    H/O cataract  2020    History of surgical removal of meniscus of knee  left in 1971    Frozen shoulder  2000    H/O Achilles tendon repair  lengthened bilaterally, 2000    Fractured skull  1968        SOCIAL HISTORY: No active tobacco, alcohol or illicit drug use    FAMILY HISTORY:      Home Medications:  aspirin 81 mg oral tablet, chewable: 1 tab(s) orally once a day (01 Apr 2022 14:10)  atorvastatin 40 mg oral tablet: 1 tab(s) orally once a day (01 Apr 2022 14:10)  enalapril 20 mg oral tablet: 1 tab(s) orally once a day (01 Apr 2022 14:10)  furosemide 40 mg oral tablet: 1 tab(s) orally once a day (01 Apr 2022 14:10)  Klor-Con 10 mEq oral tablet, extended release: 1 tab(s) orally once a day (01 Apr 2022 14:10)  Lantus: 35 unit(s) subcutaneous once a day (at bedtime) (01 Apr 2022 14:10)  Metoprolol Succinate ER 50 mg oral tablet, extended release: 1 tab(s) orally once a day (12 Jan 2021 21:17)  NovoLOG: patient&#x27;s own sliding scale (12 Jan 2021 21:17)      MEDICATIONS  (STANDING):  furosemide   Injectable 40 milliGRAM(s) IV Push Once    MEDICATIONS  (PRN):      Allergies    penicillins (Hives)    Intolerances        REVIEW OF SYSTEMS: Negative except as per HPI.    VITAL SIGNS:   Vital Signs Last 24 Hrs  T(C): 36.9 (01 Apr 2022 14:06), Max: 36.9 (01 Apr 2022 14:06)  T(F): 98.4 (01 Apr 2022 14:06), Max: 98.4 (01 Apr 2022 14:06)  HR: 68 (01 Apr 2022 14:06) (68 - 68)  BP: 136/66 (01 Apr 2022 14:06) (136/66 - 136/66)  BP(mean): --  RR: 20 (01 Apr 2022 14:06) (20 - 20)  SpO2: 96% (01 Apr 2022 14:06) (96% - 96%)    I&O's Summary      PHYSICAL EXAM:  Constitutional: intermittent dyspnea  HEENT NC/AT, moist mucous membranes  Pulmonary: Non-labored, breath sounds are clear bilaterally, no wheezing, rales or rhonchi  Cardiovascular: +S1, S2, RRR, +systolic murmur  Gastrointestinal: Soft, nontender, nondistended, normoactive bowel sounds  Extremities: No peripheral edema   Neurological: Alert, strength and sensitivity are grossly intact  Skin: No obvious lesions/rashes  Psych: Mood & affect appropriate    LABS: All Labs Reviewed:                        12.4   7.47  )-----------( 219      ( 01 Apr 2022 16:00 )             39.0     01 Apr 2022 16:00    139    |  103    |  14     ----------------------------<  107    4.6     |  30     |  0.99     Ca    8.6        01 Apr 2022 16:00    TPro  7.8    /  Alb  3.5    /  TBili  0.9    /  DBili  x      /  AST  29     /  ALT  22     /  AlkPhos  78     01 Apr 2022 16:00           [facility]         Shyanne Mueller, Goran, Howard, Cherise, Carlos, Alecia        [phone] (office)    Reason for Consult: shortness of breath     Interval HPI: Patient seen and examined at bedside. No acute events overnight.     HPI: Patient is 70 yo F PMHx HTN, DM2, LBBB, chronic lymphedema presents to ED with sob and cough and chronic b/l lymph edema. Reports shortness of breath with cough with white frothy sputum with associated dyspnea on exertion x 1-2 weeks. States that she takes furosemide 40mg for chronic leg edema. Denies chest pain, vomiting, abdominal pain, increased leg swelling or pain. Patient seen by cardiologist  6 MONTHS ago, had TTE. Patient doesnot recall the results. Results are in different health system, cannot be reviewed. Will repeat TTE. Reports she has chronic lymph edema, with  chronic venous stasis, ambulates with cane. Sedentary life style. Shortness of breath got worsen for the past 2 weeks.   PCP: Dr. Patterson  CARDIOLOGIST:  Dr. Lon Latham      PAST MEDICAL & SURGICAL HISTORY:  Hypertension    Diabetes    Lymphedema    H/O left bundle branch block    History of left bundle branch block (LBBB)    H/O cataract  2020    History of surgical removal of meniscus of knee  left in 1971    Frozen shoulder  2000    H/O Achilles tendon repair  lengthened bilaterally, 2000    Fractured skull  1968        SOCIAL HISTORY: No active tobacco, alcohol or illicit drug use    FAMILY HISTORY:      Home Medications:  aspirin 81 mg oral tablet, chewable: 1 tab(s) orally once a day (01 Apr 2022 14:10)  atorvastatin 40 mg oral tablet: 1 tab(s) orally once a day (01 Apr 2022 14:10)  enalapril 20 mg oral tablet: 1 tab(s) orally once a day (01 Apr 2022 14:10)  furosemide 40 mg oral tablet: 1 tab(s) orally once a day (01 Apr 2022 14:10)  Klor-Con 10 mEq oral tablet, extended release: 1 tab(s) orally once a day (01 Apr 2022 14:10)  Lantus: 35 unit(s) subcutaneous once a day (at bedtime) (01 Apr 2022 14:10)  Metoprolol Succinate ER 50 mg oral tablet, extended release: 1 tab(s) orally once a day (12 Jan 2021 21:17)  NovoLOG: patient&#x27;s own sliding scale (12 Jan 2021 21:17)      MEDICATIONS  (STANDING):  furosemide   Injectable 40 milliGRAM(s) IV Push Once    MEDICATIONS  (PRN):      Allergies    penicillins (Hives)    Intolerances        REVIEW OF SYSTEMS: Negative except as per HPI.    VITAL SIGNS:   Vital Signs Last 24 Hrs  T(C): 36.9 (01 Apr 2022 14:06), Max: 36.9 (01 Apr 2022 14:06)  T(F): 98.4 (01 Apr 2022 14:06), Max: 98.4 (01 Apr 2022 14:06)  HR: 68 (01 Apr 2022 14:06) (68 - 68)  BP: 136/66 (01 Apr 2022 14:06) (136/66 - 136/66)  BP(mean): --  RR: 20 (01 Apr 2022 14:06) (20 - 20)  SpO2: 96% (01 Apr 2022 14:06) (96% - 96%)    I&O's Summary      PHYSICAL EXAM:  Constitutional: intermittent dyspnea improved, on room air now.   HEENT NC/AT, moist mucous membranes  Pulmonary: Non-labored, breath sounds are clear bilaterally, no wheezing, rales or rhonchi  Cardiovascular: +S1, S2, RRR, +systolic murmur  Gastrointestinal: Soft, nontender, nondistended, normoactive bowel sounds  Extremities:  mild weakness generalized, +3 b/l LE leg edema, chronic venous stasis.   Neurological: Alert, strength and sensitivity are grossly intact  Skin: +3 b/l LE leg edema, chronic venous stasis.   Psych: Mood & affect appropriate    LABS: All Labs Reviewed:                        12.4   7.47  )-----------( 219      ( 01 Apr 2022 16:00 )             39.0     01 Apr 2022 16:00    139    |  103    |  14     ----------------------------<  107    4.6     |  30     |  0.99     Ca    8.6        01 Apr 2022 16:00    TPro  7.8    /  Alb  3.5    /  TBili  0.9    /  DBili  x      /  AST  29     /  ALT  22     /  AlkPhos  78     01 Apr 2022 16:00      < from: Xray Chest 2 Views PA/Lat (04.01.22 @ 16:32) >  MPRESSION: Mild cardiomegaly. No active infiltrates. Possible trace left   pleural effusion.    --- End of Report ---            MARIAN KLINE MD; Attending Radiologist  This document has been electronically signed. Apr 1 2022  4:41PM    < end of copied text >

## 2022-04-01 NOTE — ED ADULT NURSE NOTE - NSICDXPASTMEDICALHX_GEN_ALL_CORE_FT
PAST MEDICAL HISTORY:  Diabetes     Hypertension     Lymphedema      PAST MEDICAL HISTORY:  Diabetes     H/O left bundle branch block     History of left bundle branch block (LBBB)     Hypertension     Lymphedema

## 2022-04-01 NOTE — H&P ADULT - HISTORY OF PRESENT ILLNESS
Patient is 70 yo F PMHx HTN, DM2, LBBB, lymphedema who presents to the ED with SOB x1 week. States SOB present with both exertion and rest. Also reports productive cough. Denies hx of CHF, though she is noted to take lasix and reports orthopnea; she requires two pillows to sleep. At this time, patient is sitting in bed, in NAD, states SOB is much improved s/p IV lasix.    In the ED, temp 98.4, HR 68, /66, O2 20, RR 96%   Labs were significant for glucose 107, probnp 96107  EKG: SR with 1st degree block, LBBB, unchanged from prior  CXR: Mild cardiomegaly. No active infiltrates. Possible trace left pleural effusion.  S/p IV lasix 40mg

## 2022-04-01 NOTE — H&P ADULT - PROBLEM SELECTOR PLAN 2
-Patient reports cough x1 week, doubt infectious etiology  -Afebrile, no leukocytosis  -Monitor for fevers, daily cbc

## 2022-04-01 NOTE — ED PROVIDER NOTE - NS ED ATTENDING STATEMENT MOD
This was a shared visit with the JEREMI. I reviewed and verified the documentation and independently performed the documented:

## 2022-04-01 NOTE — H&P ADULT - PROBLEM SELECTOR PLAN 4
-Home regimen: lantus 35u qhs, novolog own sliding scale  -Lantus 20u qhs ordered with ISS  -Hypoglycemia protocol

## 2022-04-01 NOTE — ED ADULT NURSE NOTE - OBJECTIVE STATEMENT
Pt. received alert and oriented x3 with chief complaint of cough for 1 week. Pt. presents w/ stage 1 pressure ulcer to sacral area. Pt. presents w/  bilateral clear lung sounds upon auscultation.

## 2022-04-01 NOTE — H&P ADULT - PROBLEM SELECTOR PLAN 1
-Patient with SOB, orthopnea, elevated proBNP 80777 likely 2/2 ADHF  -S/p IV lasix 40mg with improvement of symptoms  -Denies prior hx of CHF, but is noted to take lasix  -Has had prior TTE but no results in chart  -F/u TTE  -IV lasix 40mg qd per cardio  -Strict I's and O's, monitor volume status closely  -Cardio consulted, recs appreciated

## 2022-04-01 NOTE — H&P ADULT - ASSESSMENT
Patient is 72 yo F PMHx HTN, DM2, LBBB, lymphedema who presents to the ED with SOB x1 week. Admitted for likely CHF exacerbation.

## 2022-04-01 NOTE — CONSULT NOTE ADULT - ASSESSMENT
Patient is 70 yo F PMHx HTN, DM2,  LBBB, chronic lymphedema presents to ED with sob and cough and chronic b/l lymph edema.       - Patient is not complaining of any cardiac symptoms at this time.  - No clear evidence of acute ischemia, trops negative x 2. Will follow up third set.  - His CKs are flat, suggesting against acute atherosclerotic plaque rupture.  - Biomarker trend is not consistent with plaque rupture but rather demand ischemia. Monitor closely for the development of anginal symptoms or clinical signs of ischemia.   - No acute changes on EKG compared to previous.  - No meaningful evidence of volume overload.  - Previous TTE shows ___.  - BP well controlled, monitor routine hemodynamics.  - Continue ___.  - Monitor and replete lytes, keep K>4, Mg>2.  - Strict I/Os, daily weights.  - Pt has no active ischemia, decompensated heart failure, unstable arrythmia, or severe stenotic valvular disease, and has __ cardiac risk factors. In the setting of low risk ___, he/she is optimized from cardiovascular standpoint to proceed with planned procedure with routine hemodynamic monitoring.   - Pt has no modifiable active cardiac risk factors and in the setting of low risk _____, patient is optimized as best as possible from cardiovascular standpoint to proceed with planned procedure with routine hemodynamic monitoring.  - Other cardiovascular workup will depend on clinical course.  - All other workup per primary team.  - Will continue to follow.             Patient is 70 yo F PMHx HTN, DM2,  LBBB, chronic lymphedema presents to ED with sob and cough and chronic b/l lymph edema. Consult for shortness of breath.     - Shortness of breath improved, s/p Lasix 40mg IV. No other cardiac symptoms at this time.  - Patient with hx of chronic lymph edema on Lasix 40mg PO at home. Will HOLD PO LASIX and give IV Lasix  - No clear evidence of acute ischemia, trops negative x 1. Pro- BNP 29513.  Monitor closely for the development of anginal symptoms or clinical signs of ischemia.   - No acute changes on EKG compared to previous. EKG Shows sinus rhythm with 1st degree block 63bpm QT/QTc 486/497  - Continue Metoprolol succinate ER 50mg , Enalapril 20mg with hold parameters.   - No previous TTE found in the system, ordered TTE, will  follow results.  - Continue LASIX 40mg IV Q12h with hold parameters.  - BP well controlled, monitor routine hemodynamics.  - Monitor and replete lytes, keep K>4, Mg>2.  - Strict I/Os, daily weights.  - Other cardiovascular workup will depend on clinical course.  - All other workup per primary team.  - Will continue to follow.             Patient is 70 yo F PMHx HTN, DM2,  LBBB, chronic lymphedema presents to ED with sob and cough. Consult for shortness of breath. Mild CHF 2/2 worsening of chronic lymph edema.     - Shortness of breath improved, s/p Lasix 40mg IV. No other cardiac symptoms at this time.  - Patient with hx of chronic lymph edema on Lasix 40mg PO at home. Will HOLD PO LASIX and give IV Lasix  - No clear evidence of acute ischemia, trops negative x 1. Pro- BNP 54338.  Monitor closely for the development of anginal symptoms or clinical signs of ischemia.   - No acute changes on EKG compared to previous. EKG Shows sinus rhythm with 1st degree block 63bpm QT/QTc 486/497  - Continue Metoprolol succinate ER 50mg , Enalapril 20mg with hold parameters.   - No previous TTE found in the system, ordered TTE, will follow results.  - Continue LASIX 40mg IV Q12h with hold parameters.  - BP well controlled, monitor routine hemodynamics.  - Monitor and replete lytes, keep K>4, Mg>2.  - Strict I/Os, daily weights.  - Other cardiovascular workup will depend on clinical course.  - All other workup per primary team.  - Will continue to follow.

## 2022-04-01 NOTE — ED PROVIDER NOTE - PROGRESS NOTE DETAILS
Spoke with cardiologist, Dr. Luong who will consult pt. Spoke with hospitalist, Dr. Chen who accepted admission.

## 2022-04-01 NOTE — H&P ADULT - NSHPPHYSICALEXAM_GEN_ALL_CORE
T(C): 36.9 (04-01-22 @ 14:06), Max: 36.9 (04-01-22 @ 14:06)  HR: 72 (04-01-22 @ 18:40) (68 - 72)  BP: 180/77 (04-01-22 @ 18:40) (136/66 - 180/77)  RR: 18 (04-01-22 @ 18:40) (18 - 20)  SpO2: 98% (04-01-22 @ 18:40) (96% - 98%)    GENERAL: patient appears well, no acute distress, appropriate, pleasant  EYES: sclera clear, no exudates  ENMT: moist mucous membranes  NECK: supple, soft  LUNGS: good air entry bilaterally, clear to auscultation, symmetric breath sounds, no wheezing or rhonchi appreciated  HEART: soft S1/S2, regular rate and rhythm, +systolic murmur, +pitting edema b/l  GASTROINTESTINAL: abdomen is soft, nontender, nondistended, normoactive bowel sounds  INTEGUMENT: good skin turgor, no lesions noted  MUSCULOSKELETAL: no clubbing or cyanosis, no obvious deformity  NEUROLOGIC: awake, alert, oriented x3, good muscle tone in 4 extremities, no obvious sensory deficits  EXTREMITIES: +lymphedema and venous stasis changes LE b/l R >L

## 2022-04-01 NOTE — ED PROVIDER NOTE - CLINICAL SUMMARY MEDICAL DECISION MAKING FREE TEXT BOX
pt c/o 1 week of cough with white frothy sputum, associated with sob, quigley. no fevers, cp, abd pain. pt reports chronic lymphedema, unchanged  plan - ekg/cxr/labs/lasix

## 2022-04-01 NOTE — ED PROVIDER NOTE - OBJECTIVE STATEMENT
72 y/o F with hx of DM, LBBB, HTN, lymphedema presents with c/o cough with white frothy sputum with associated shortness of breath and dyspnea on exertion x 1 week. States that she takes furosemide 40mg for leg edema. Denies chest pain, vomiting, abdominal pain, increased leg swelling or pain.  pcp: Dr. Patterson  cardio: Dr. Lon Latham

## 2022-04-01 NOTE — PATIENT PROFILE ADULT - FALL HARM RISK - HARM RISK INTERVENTIONS

## 2022-04-01 NOTE — ED PROVIDER NOTE - MUSCULOSKELETAL, MLM
Spine appears normal, range of motion is not limited, no muscle or joint tenderness, +chronic BLE lymphedema noted

## 2022-04-02 LAB
A1C WITH ESTIMATED AVERAGE GLUCOSE RESULT: 6.7 % — HIGH (ref 4–5.6)
ALBUMIN SERPL ELPH-MCNC: 3.2 G/DL — LOW (ref 3.3–5)
ALP SERPL-CCNC: 73 U/L — SIGNIFICANT CHANGE UP (ref 40–120)
ALT FLD-CCNC: 23 U/L — SIGNIFICANT CHANGE UP (ref 12–78)
ANION GAP SERPL CALC-SCNC: 6 MMOL/L — SIGNIFICANT CHANGE UP (ref 5–17)
AST SERPL-CCNC: 25 U/L — SIGNIFICANT CHANGE UP (ref 15–37)
BASOPHILS # BLD AUTO: 0.08 K/UL — SIGNIFICANT CHANGE UP (ref 0–0.2)
BASOPHILS NFR BLD AUTO: 1.3 % — SIGNIFICANT CHANGE UP (ref 0–2)
BILIRUB SERPL-MCNC: 0.8 MG/DL — SIGNIFICANT CHANGE UP (ref 0.2–1.2)
BUN SERPL-MCNC: 13 MG/DL — SIGNIFICANT CHANGE UP (ref 7–23)
CALCIUM SERPL-MCNC: 8.8 MG/DL — SIGNIFICANT CHANGE UP (ref 8.5–10.1)
CHLORIDE SERPL-SCNC: 104 MMOL/L — SIGNIFICANT CHANGE UP (ref 96–108)
CO2 SERPL-SCNC: 32 MMOL/L — HIGH (ref 22–31)
CREAT SERPL-MCNC: 1 MG/DL — SIGNIFICANT CHANGE UP (ref 0.5–1.3)
EGFR: 61 ML/MIN/1.73M2 — SIGNIFICANT CHANGE UP
EOSINOPHIL # BLD AUTO: 0.22 K/UL — SIGNIFICANT CHANGE UP (ref 0–0.5)
EOSINOPHIL NFR BLD AUTO: 3.6 % — SIGNIFICANT CHANGE UP (ref 0–6)
ESTIMATED AVERAGE GLUCOSE: 146 MG/DL — HIGH (ref 68–114)
GLUCOSE SERPL-MCNC: 81 MG/DL — SIGNIFICANT CHANGE UP (ref 70–99)
HCT VFR BLD CALC: 37 % — SIGNIFICANT CHANGE UP (ref 34.5–45)
HGB BLD-MCNC: 12 G/DL — SIGNIFICANT CHANGE UP (ref 11.5–15.5)
IMM GRANULOCYTES NFR BLD AUTO: 0.3 % — SIGNIFICANT CHANGE UP (ref 0–1.5)
LYMPHOCYTES # BLD AUTO: 0.88 K/UL — LOW (ref 1–3.3)
LYMPHOCYTES # BLD AUTO: 14.2 % — SIGNIFICANT CHANGE UP (ref 13–44)
MAGNESIUM SERPL-MCNC: 1.9 MG/DL — SIGNIFICANT CHANGE UP (ref 1.6–2.6)
MCHC RBC-ENTMCNC: 28.1 PG — SIGNIFICANT CHANGE UP (ref 27–34)
MCHC RBC-ENTMCNC: 32.4 GM/DL — SIGNIFICANT CHANGE UP (ref 32–36)
MCV RBC AUTO: 86.7 FL — SIGNIFICANT CHANGE UP (ref 80–100)
MONOCYTES # BLD AUTO: 0.7 K/UL — SIGNIFICANT CHANGE UP (ref 0–0.9)
MONOCYTES NFR BLD AUTO: 11.3 % — SIGNIFICANT CHANGE UP (ref 2–14)
NEUTROPHILS # BLD AUTO: 4.29 K/UL — SIGNIFICANT CHANGE UP (ref 1.8–7.4)
NEUTROPHILS NFR BLD AUTO: 69.3 % — SIGNIFICANT CHANGE UP (ref 43–77)
NRBC # BLD: 0 /100 WBCS — SIGNIFICANT CHANGE UP (ref 0–0)
PHOSPHATE SERPL-MCNC: 3.4 MG/DL — SIGNIFICANT CHANGE UP (ref 2.5–4.5)
PLATELET # BLD AUTO: 172 K/UL — SIGNIFICANT CHANGE UP (ref 150–400)
POTASSIUM SERPL-MCNC: 3.6 MMOL/L — SIGNIFICANT CHANGE UP (ref 3.5–5.3)
POTASSIUM SERPL-SCNC: 3.6 MMOL/L — SIGNIFICANT CHANGE UP (ref 3.5–5.3)
PROT SERPL-MCNC: 7.2 G/DL — SIGNIFICANT CHANGE UP (ref 6–8.3)
RBC # BLD: 4.27 M/UL — SIGNIFICANT CHANGE UP (ref 3.8–5.2)
RBC # FLD: 15 % — HIGH (ref 10.3–14.5)
SODIUM SERPL-SCNC: 142 MMOL/L — SIGNIFICANT CHANGE UP (ref 135–145)
WBC # BLD: 6.19 K/UL — SIGNIFICANT CHANGE UP (ref 3.8–10.5)
WBC # FLD AUTO: 6.19 K/UL — SIGNIFICANT CHANGE UP (ref 3.8–10.5)

## 2022-04-02 PROCEDURE — 99233 SBSQ HOSP IP/OBS HIGH 50: CPT | Mod: GC

## 2022-04-02 PROCEDURE — 99233 SBSQ HOSP IP/OBS HIGH 50: CPT

## 2022-04-02 RX ORDER — METOPROLOL TARTRATE 50 MG
75 TABLET ORAL DAILY
Refills: 0 | Status: DISCONTINUED | OUTPATIENT
Start: 2022-04-02 | End: 2022-04-02

## 2022-04-02 RX ORDER — NYSTATIN CREAM 100000 [USP'U]/G
1 CREAM TOPICAL ONCE
Refills: 0 | Status: COMPLETED | OUTPATIENT
Start: 2022-04-02 | End: 2022-04-02

## 2022-04-02 RX ORDER — POTASSIUM CHLORIDE 20 MEQ
40 PACKET (EA) ORAL ONCE
Refills: 0 | Status: COMPLETED | OUTPATIENT
Start: 2022-04-02 | End: 2022-04-02

## 2022-04-02 RX ORDER — METOPROLOL TARTRATE 50 MG
75 TABLET ORAL DAILY
Refills: 0 | Status: DISCONTINUED | OUTPATIENT
Start: 2022-04-02 | End: 2022-04-04

## 2022-04-02 RX ADMIN — Medication 75 MILLIGRAM(S): at 13:02

## 2022-04-02 RX ADMIN — Medication 81 MILLIGRAM(S): at 13:02

## 2022-04-02 RX ADMIN — ATORVASTATIN CALCIUM 40 MILLIGRAM(S): 80 TABLET, FILM COATED ORAL at 21:35

## 2022-04-02 RX ADMIN — ENOXAPARIN SODIUM 40 MILLIGRAM(S): 100 INJECTION SUBCUTANEOUS at 07:08

## 2022-04-02 RX ADMIN — INSULIN GLARGINE 20 UNIT(S): 100 INJECTION, SOLUTION SUBCUTANEOUS at 21:36

## 2022-04-02 RX ADMIN — NYSTATIN CREAM 1 APPLICATION(S): 100000 CREAM TOPICAL at 13:03

## 2022-04-02 RX ADMIN — ENOXAPARIN SODIUM 40 MILLIGRAM(S): 100 INJECTION SUBCUTANEOUS at 17:47

## 2022-04-02 RX ADMIN — Medication 20 MILLIGRAM(S): at 07:08

## 2022-04-02 RX ADMIN — Medication 40 MILLIEQUIVALENT(S): at 13:02

## 2022-04-02 RX ADMIN — Medication 3 MILLIGRAM(S): at 21:35

## 2022-04-02 RX ADMIN — Medication 40 MILLIGRAM(S): at 07:08

## 2022-04-02 NOTE — PROGRESS NOTE ADULT - ASSESSMENT
72 yo F PMHx HTN, DM2,  LBBB, chronic lymphedema presents to ED with sob and cough. Consult for shortness of breath. Mild CHF 2/2 worsening of chronic lymph edema.     ADHF  - Tolerating RA.  Admits to have been diuresing well  - Continue Lasix 40 mg IV q12H  - Patient with hx of chronic lymph edema on Lasix 40mg PO at home.   - Pro- BNP 85747.  Please, record I/O's and daily weights  - Monitor renal function and electrolytes while aggressively diuresing.    - Replete electrolytes to keep K>4 and Mag>2  - EKG Shows sinus rhythm with 1st degree block 63bpm QT/QTc 486/497.  Non-ischemic.  No anginal complaints  - Had 8 and 12 beats NSVT overnight.  Increase Toprol XL to 75 mg daily  - Reduce Enalapril to 10mg daily to allow room for AVN uptitration  - No previous TTE for comparisson.  F/U TTE; patient has audible murmur  - BP initially elevated; improved.  Monitor routine hemodynamics.    - Will continue to follow.    Kira Zuniga DNP, NP-C  Cardiology   Spectra #[phone]        70 yo F PMHx HTN, DM2,  LBBB, chronic lymphedema presents to ED with sob and cough. Consult for shortness of breath. Mild CHF 2/2 worsening of chronic lymph edema.     ADHF  - Tolerating RA.  Admits to have been diuresing well  - Continue Lasix 40 mg IV QD  - Patient with hx of chronic lymph edema on Lasix 40mg PO at home.   - Pro- BNP 80700.  Please, record I/O's and daily weights  - Monitor renal function and electrolytes while aggressively diuresing.    - Replete electrolytes to keep K>4 and Mag>2  - EKG Shows sinus rhythm with 1st degree block 63bpm QT/QTc 486/497.  Non-ischemic.  No anginal complaints  - Had 8 and 12 beats NSVT overnight.  Increase Toprol XL to 75 mg daily  - Reduce Enalapril to 10mg daily to allow room for AVN uptitration  - No previous TTE for comparisson.  F/U TTE; patient has audible murmur  - BP initially elevated; improved.  Monitor routine hemodynamics.    - Will continue to follow.    Kira Zuniga DNP, NP-C  Cardiology   Spectra #[phone]        72 yo F PMHx HTN, DM2,  LBBB, chronic lymphedema presents to ED with sob and cough. Consult for shortness of breath. Mild CHF 2/2 worsening of chronic lymph edema.     ADHF  - Tolerating RA.  Admits to have been diuresing well  - Continue Lasix 40 mg IV QD  - Patient with hx of chronic lymph edema on Lasix 40mg PO at home.   - Pro- BNP 78515.  Please, record I/O's and daily weights  - Monitor renal function and electrolytes while aggressively diuresing.    - Replete electrolytes to keep K>4 and Mag>2  - EKG Shows sinus rhythm with 1st degree block 63bpm QT/QTc 486/497.  Non-ischemic.  No anginal complaints  - Had 8 and 12 beats NSVT overnight.  Increase Toprol XL to 75 mg daily with lower parameters  - Reduce Enalapril to 10mg daily to allow room for AVN uptitration  - No previous TTE for comparisson.  F/U TTE; patient has audible murmur  - BP initially elevated; improved.  Monitor routine hemodynamics.    - Will continue to follow.    Kira Zuniga DNP, NP-C  Cardiology   Spectra #[phone]

## 2022-04-02 NOTE — PROGRESS NOTE ADULT - PROBLEM SELECTOR PLAN 1
Patient with no known hx of CHF presents with SOB, orthopnea, elevated proBNP 54520 in the setting of worsening lymphedema  - S/p IV Lasix 40mg x1 with improvement of symptoms  - F/u TTE  - Continue IV Lasix 40mg qd  - Strict I&Os, monitor volume status  - Monitor and replete lytes PRN  - Cardio consulted, recs appreciated Patient with no known hx of CHF presents with SOB, orthopnea, elevated proBNP 62926 in the setting of worsening lymphedema  - S/p IV Lasix 40mg x1 with improvement of symptoms  - F/u TTE  - Continue IV Lasix 40mg qd  - Patient follows with Cardio Dr. Lon Latham at [facility]  - Strict I&Os, monitor volume status  - Monitor and replete lytes PRN  - Cardio consulted, recs appreciated

## 2022-04-02 NOTE — PROGRESS NOTE ADULT - SUBJECTIVE AND OBJECTIVE BOX
[facility] -- Shyanne Mueller, Howard Zimmer, Carlos Luong Savella, [place]  Office # [phone]    Follow Up:  ADHF    Subjective/Observations: Seen comfortable on RA.  States she slept well overnight.  Admits to have diuresed well.  Denies chest pain or palpitations.  Denies CURTIS on ambulation to BR.  Tele events noted    REVIEW OF SYSTEMS: All other review of systems is negative unless indicated above  PAST MEDICAL & SURGICAL HISTORY:  Hypertension    Diabetes    Lymphedema    H/O left bundle branch block    History of left bundle branch block (LBBB)    H/O cataract  2020    History of surgical removal of meniscus of knee  left in 1971    Frozen shoulder  2000    H/O Achilles tendon repair  lengthened bilaterally, 2000    Fractured skull  1968    MEDICATIONS  (STANDING):  aspirin  chewable 81 milliGRAM(s) Oral daily  atorvastatin 40 milliGRAM(s) Oral at bedtime  dextrose 5%. 1000 milliLiter(s) (50 mL/Hr) IV Continuous <Continuous>  dextrose 5%. 1000 milliLiter(s) (100 mL/Hr) IV Continuous <Continuous>  dextrose 50% Injectable 25 Gram(s) IV Push once  dextrose 50% Injectable 12.5 Gram(s) IV Push once  dextrose 50% Injectable 25 Gram(s) IV Push once  enalapril 20 milliGRAM(s) Oral daily  enoxaparin Injectable 40 milliGRAM(s) SubCutaneous every 12 hours  furosemide   Injectable 40 milliGRAM(s) IV Push daily  glucagon  Injectable 1 milliGRAM(s) IntraMuscular once  insulin glargine Injectable (LANTUS) 20 Unit(s) SubCutaneous at bedtime  insulin lispro (ADMELOG) corrective regimen sliding scale   SubCutaneous three times a day before meals  insulin lispro (ADMELOG) corrective regimen sliding scale   SubCutaneous at bedtime  metoprolol succinate ER 50 milliGRAM(s) Oral daily    MEDICATIONS  (PRN):  dextrose Oral Gel 15 Gram(s) Oral once PRN Blood Glucose LESS THAN 70 milliGRAM(s)/deciliter  melatonin 3 milliGRAM(s) Oral at bedtime PRN Insomnia    Allergies    penicillins (Hives)    Intolerances    Vital Signs Last 24 Hrs  T(C): 36.3 (02 Apr 2022 06:58), Max: 36.9 (01 Apr 2022 14:06)  T(F): 97.3 (02 Apr 2022 06:58), Max: 98.4 (01 Apr 2022 14:06)  HR: 57 (02 Apr 2022 06:58) (57 - 72)  BP: 110/61 (02 Apr 2022 06:58) (110/61 - 180/77)  BP(mean): --  RR: 19 (02 Apr 2022 06:58) (18 - 20)  SpO2: 92% (02 Apr 2022 06:58) (92% - 99%)  I&O's Summary    Weight (kg): 179.5 (04-01 @ 21:52)    PHYSICAL EXAM:  TELE: SB with 8/12 NSVT  Constitutional: NAD, awake and alert  HEENT: Moist Mucous Membranes, Anicteric  Pulmonary: Non-labored, breath sounds are diminished bilaterally, No wheezing, rales or rhonchi  Cardiovascular: Regular, S1 and S2, + murmurs, no rubs, gallops or clicks  Gastrointestinal: Bowel Sounds present, soft, nontender.   Lymph: +2 BLE edema. +BLE lymphedema R>L  Skin: No visible rashes. +chronic skin changes BLE.  Psych:  Mood & affect appropriate  LABS: All Labs Reviewed:                        12.4   7.47  )-----------( 219      ( 01 Apr 2022 16:00 )             39.0     01 Apr 2022 16:00    139    |  103    |  14     ----------------------------<  107    4.6     |  30     |  0.99     Ca    8.6        01 Apr 2022 16:00    TPro  7.8    /  Alb  3.5    /  TBili  0.9    /  DBili  x      /  AST  29     /  ALT  22     /  AlkPhos  78     01 Apr 2022 16:00      ACC: [id number] EXAM:  XR CHEST PA LAT 2V                          PROCEDURE DATE:  04/01/2022          INTERPRETATION:  Chest pain short of breath.    PA lateral. Prior 7/2/2021.    Low lung volumes. Cardiomegaly with left ventricular configuration   similar to prior. Nonspecific chronic accentuation bibasilar   bronchovascular markings similar to prior. No focal consolidation.   Possible trace left pleural effusion.    IMPRESSION: Mild cardiomegaly. No active infiltrates. Possible trace left   pleural effusion.    --- End of Report ---    MARIAN KLINE MD; Attending Radiologist  This document has been electronically signed. Apr 1 2022  4:41PM

## 2022-04-02 NOTE — PROGRESS NOTE ADULT - SUBJECTIVE AND OBJECTIVE BOX
Patient is a 71y old  Female who presents with a chief complaint of CHF exacerbation (02 Apr 2022 07:04)      INTERVAL HPI/OVERNIGHT EVENTS: No acute events overnight. Patient seen and examined at bedside. Admits her shortness of breath and cough are largely improved since admission. Denies fever/chills, chest pain, headache, dizziness, nausea/vomiting, abdominal pain.     MEDICATIONS  (STANDING):  aspirin  chewable 81 milliGRAM(s) Oral daily  atorvastatin 40 milliGRAM(s) Oral at bedtime  dextrose 5%. 1000 milliLiter(s) (50 mL/Hr) IV Continuous <Continuous>  dextrose 5%. 1000 milliLiter(s) (100 mL/Hr) IV Continuous <Continuous>  dextrose 50% Injectable 25 Gram(s) IV Push once  dextrose 50% Injectable 12.5 Gram(s) IV Push once  dextrose 50% Injectable 25 Gram(s) IV Push once  enalapril 10 milliGRAM(s) Oral daily  enoxaparin Injectable 40 milliGRAM(s) SubCutaneous every 12 hours  furosemide   Injectable 40 milliGRAM(s) IV Push daily  glucagon  Injectable 1 milliGRAM(s) IntraMuscular once  insulin glargine Injectable (LANTUS) 20 Unit(s) SubCutaneous at bedtime  insulin lispro (ADMELOG) corrective regimen sliding scale   SubCutaneous three times a day before meals  insulin lispro (ADMELOG) corrective regimen sliding scale   SubCutaneous at bedtime  metoprolol succinate ER 75 milliGRAM(s) Oral daily  nystatin Powder 1 Application(s) Topical once    MEDICATIONS  (PRN):  dextrose Oral Gel 15 Gram(s) Oral once PRN Blood Glucose LESS THAN 70 milliGRAM(s)/deciliter  melatonin 3 milliGRAM(s) Oral at bedtime PRN Insomnia      Allergies    penicillins (Hives)    Intolerances        REVIEW OF SYSTEMS:  CONSTITUTIONAL: No fever or chills  HEENT:  No headache, no sore throat  RESPIRATORY: Cough and shortness of breath largely improved  CARDIOVASCULAR: No chest pain, palpitations  GASTROINTESTINAL: No abd pain, nausea, vomiting, or diarrhea  NEUROLOGICAL: no focal weakness or dizziness  MUSCULOSKELETAL: no myalgias     Vital Signs Last 24 Hrs  T(C): 36.5 (02 Apr 2022 11:40), Max: 36.9 (01 Apr 2022 14:06)  T(F): 97.7 (02 Apr 2022 11:40), Max: 98.4 (01 Apr 2022 14:06)  HR: 56 (02 Apr 2022 11:40) (56 - 72)  BP: 153/69 (02 Apr 2022 11:40) (110/61 - 180/77)  BP(mean): --  RR: 18 (02 Apr 2022 11:40) (18 - 20)  SpO2: 92% (02 Apr 2022 11:40) (92% - 99%)    PHYSICAL EXAM:  GENERAL: NAD, seated in bed  HEENT:  anicteric, moist mucous membranes  CHEST/LUNG: decreased breath sounds b/l, no rales, wheezes, or rhonchi, respirations unlabored  HEART:  RRR, S1, S2  ABDOMEN:  soft, nontender, obese  EXTREMITIES: chronic lymphedema and venous stasis changes b/l LE  NERVOUS SYSTEM: answers questions and follows commands appropriately    LABS:                        12.0   6.19  )-----------( 172      ( 02 Apr 2022 11:08 )             37.0     CBC Full  -  ( 02 Apr 2022 11:08 )  WBC Count : 6.19 K/uL  Hemoglobin : 12.0 g/dL  Hematocrit : 37.0 %  Platelet Count - Automated : 172 K/uL  Mean Cell Volume : 86.7 fl  Mean Cell Hemoglobin : 28.1 pg  Mean Cell Hemoglobin Concentration : 32.4 gm/dL  Auto Neutrophil # : 4.29 K/uL  Auto Lymphocyte # : 0.88 K/uL  Auto Monocyte # : 0.70 K/uL  Auto Eosinophil # : 0.22 K/uL  Auto Basophil # : 0.08 K/uL  Auto Neutrophil % : 69.3 %  Auto Lymphocyte % : 14.2 %  Auto Monocyte % : 11.3 %  Auto Eosinophil % : 3.6 %  Auto Basophil % : 1.3 %    02 Apr 2022 11:08    142    |  104    |  13     ----------------------------<  81     3.6     |  32     |  1.00     Ca    8.8        02 Apr 2022 11:08  Phos  3.4       02 Apr 2022 11:08  Mg     1.9       02 Apr 2022 11:08    TPro  7.2    /  Alb  3.2    /  TBili  0.8    /  DBili  x      /  AST  25     /  ALT  23     /  AlkPhos  73     02 Apr 2022 11:08        CAPILLARY BLOOD GLUCOSE      POCT Blood Glucose.: 77 mg/dL (02 Apr 2022 07:46)  POCT Blood Glucose.: 127 mg/dL (01 Apr 2022 22:50)  POCT Blood Glucose.: 154 mg/dL (01 Apr 2022 19:02)          RADIOLOGY & ADDITIONAL TESTS:    Personally reviewed.     Consultant(s) Notes Reviewed:  [x] YES  [ ] NO     Patient is a 71y old  Female who presents with a chief complaint of CHF exacerbation (02 Apr 2022 07:04)      INTERVAL HPI/OVERNIGHT EVENTS: No acute events overnight. Patient seen and examined at bedside. Admits her shortness of breath and cough are largely improved since admission. Denies fever/chills, chest pain, headache, dizziness, nausea/vomiting, abdominal pain.     MEDICATIONS  (STANDING):  aspirin  chewable 81 milliGRAM(s) Oral daily  atorvastatin 40 milliGRAM(s) Oral at bedtime  dextrose 5%. 1000 milliLiter(s) (50 mL/Hr) IV Continuous <Continuous>  dextrose 5%. 1000 milliLiter(s) (100 mL/Hr) IV Continuous <Continuous>  dextrose 50% Injectable 25 Gram(s) IV Push once  dextrose 50% Injectable 12.5 Gram(s) IV Push once  dextrose 50% Injectable 25 Gram(s) IV Push once  enalapril 10 milliGRAM(s) Oral daily  enoxaparin Injectable 40 milliGRAM(s) SubCutaneous every 12 hours  furosemide   Injectable 40 milliGRAM(s) IV Push daily  glucagon  Injectable 1 milliGRAM(s) IntraMuscular once  insulin glargine Injectable (LANTUS) 20 Unit(s) SubCutaneous at bedtime  insulin lispro (ADMELOG) corrective regimen sliding scale   SubCutaneous three times a day before meals  insulin lispro (ADMELOG) corrective regimen sliding scale   SubCutaneous at bedtime  metoprolol succinate ER 75 milliGRAM(s) Oral daily  nystatin Powder 1 Application(s) Topical once    MEDICATIONS  (PRN):  dextrose Oral Gel 15 Gram(s) Oral once PRN Blood Glucose LESS THAN 70 milliGRAM(s)/deciliter  melatonin 3 milliGRAM(s) Oral at bedtime PRN Insomnia      Allergies    penicillins (Hives)    Intolerances        REVIEW OF SYSTEMS:  CONSTITUTIONAL: No fever or chills  HEENT:  No headache, no sore throat  RESPIRATORY: Cough and shortness of breath largely improved  CARDIOVASCULAR: No chest pain, palpitations  GASTROINTESTINAL: No abd pain, nausea, vomiting, or diarrhea  NEUROLOGICAL: no focal weakness or dizziness  MUSCULOSKELETAL: no myalgias     Vital Signs Last 24 Hrs  T(C): 36.5 (02 Apr 2022 11:40), Max: 36.9 (01 Apr 2022 14:06)  T(F): 97.7 (02 Apr 2022 11:40), Max: 98.4 (01 Apr 2022 14:06)  HR: 56 (02 Apr 2022 11:40) (56 - 72)  BP: 153/69 (02 Apr 2022 11:40) (110/61 - 180/77)  BP(mean): --  RR: 18 (02 Apr 2022 11:40) (18 - 20)  SpO2: 92% (02 Apr 2022 11:40) (92% - 99%)    PHYSICAL EXAM:  GENERAL: NAD, seated in bed  HEENT:  anicteric, moist mucous membranes  CHEST/LUNG: decreased breath sounds b/l, no rales, wheezes, or rhonchi, respirations unlabored  HEART:  RRR, S1, S2, + systolic murmur  ABDOMEN:  soft, nontender, obese  EXTREMITIES: chronic lymphedema and venous stasis changes b/l LE  NERVOUS SYSTEM: answers questions and follows commands appropriately    LABS:                        12.0   6.19  )-----------( 172      ( 02 Apr 2022 11:08 )             37.0     CBC Full  -  ( 02 Apr 2022 11:08 )  WBC Count : 6.19 K/uL  Hemoglobin : 12.0 g/dL  Hematocrit : 37.0 %  Platelet Count - Automated : 172 K/uL  Mean Cell Volume : 86.7 fl  Mean Cell Hemoglobin : 28.1 pg  Mean Cell Hemoglobin Concentration : 32.4 gm/dL  Auto Neutrophil # : 4.29 K/uL  Auto Lymphocyte # : 0.88 K/uL  Auto Monocyte # : 0.70 K/uL  Auto Eosinophil # : 0.22 K/uL  Auto Basophil # : 0.08 K/uL  Auto Neutrophil % : 69.3 %  Auto Lymphocyte % : 14.2 %  Auto Monocyte % : 11.3 %  Auto Eosinophil % : 3.6 %  Auto Basophil % : 1.3 %    02 Apr 2022 11:08    142    |  104    |  13     ----------------------------<  81     3.6     |  32     |  1.00     Ca    8.8        02 Apr 2022 11:08  Phos  3.4       02 Apr 2022 11:08  Mg     1.9       02 Apr 2022 11:08    TPro  7.2    /  Alb  3.2    /  TBili  0.8    /  DBili  x      /  AST  25     /  ALT  23     /  AlkPhos  73     02 Apr 2022 11:08        CAPILLARY BLOOD GLUCOSE      POCT Blood Glucose.: 77 mg/dL (02 Apr 2022 07:46)  POCT Blood Glucose.: 127 mg/dL (01 Apr 2022 22:50)  POCT Blood Glucose.: 154 mg/dL (01 Apr 2022 19:02)          RADIOLOGY & ADDITIONAL TESTS:    Personally reviewed.     Consultant(s) Notes Reviewed:  [x] YES  [ ] NO     Patient is a 71y old  Female who presents with a chief complaint of CHF exacerbation (02 Apr 2022 07:04)      INTERVAL HPI/OVERNIGHT EVENTS: No acute events overnight. Patient seen and examined at bedside. Admits her shortness of breath and cough are largely improved since admission. Denies fever/chills, chest pain, headache, dizziness, nausea/vomiting, abdominal pain.     MEDICATIONS  (STANDING):  aspirin  chewable 81 milliGRAM(s) Oral daily  atorvastatin 40 milliGRAM(s) Oral at bedtime  dextrose 5%. 1000 milliLiter(s) (50 mL/Hr) IV Continuous <Continuous>  dextrose 5%. 1000 milliLiter(s) (100 mL/Hr) IV Continuous <Continuous>  dextrose 50% Injectable 25 Gram(s) IV Push once  dextrose 50% Injectable 12.5 Gram(s) IV Push once  dextrose 50% Injectable 25 Gram(s) IV Push once  enalapril 10 milliGRAM(s) Oral daily  enoxaparin Injectable 40 milliGRAM(s) SubCutaneous every 12 hours  furosemide   Injectable 40 milliGRAM(s) IV Push daily  glucagon  Injectable 1 milliGRAM(s) IntraMuscular once  insulin glargine Injectable (LANTUS) 20 Unit(s) SubCutaneous at bedtime  insulin lispro (ADMELOG) corrective regimen sliding scale   SubCutaneous three times a day before meals  insulin lispro (ADMELOG) corrective regimen sliding scale   SubCutaneous at bedtime  metoprolol succinate ER 75 milliGRAM(s) Oral daily  nystatin Powder 1 Application(s) Topical once    MEDICATIONS  (PRN):  dextrose Oral Gel 15 Gram(s) Oral once PRN Blood Glucose LESS THAN 70 milliGRAM(s)/deciliter  melatonin 3 milliGRAM(s) Oral at bedtime PRN Insomnia      Allergies    penicillins (Hives)    Intolerances        REVIEW OF SYSTEMS:  CONSTITUTIONAL: No fever or chills  HEENT:  No headache, no sore throat  RESPIRATORY: Cough and shortness of breath largely improved  CARDIOVASCULAR: No chest pain, palpitations  GASTROINTESTINAL: No abd pain, nausea, vomiting, or diarrhea  NEUROLOGICAL: no focal weakness or dizziness  MUSCULOSKELETAL: no myalgias or arthralgias    Vital Signs Last 24 Hrs  T(C): 36.5 (02 Apr 2022 11:40), Max: 36.9 (01 Apr 2022 14:06)  T(F): 97.7 (02 Apr 2022 11:40), Max: 98.4 (01 Apr 2022 14:06)  HR: 56 (02 Apr 2022 11:40) (56 - 72)  BP: 153/69 (02 Apr 2022 11:40) (110/61 - 180/77)  BP(mean): --  RR: 18 (02 Apr 2022 11:40) (18 - 20)  SpO2: 92% (02 Apr 2022 11:40) (92% - 99%)    PHYSICAL EXAM:  GENERAL: NAD, seated in bed  HEENT:  anicteric, moist mucous membranes  CHEST/LUNG: decreased breath sounds b/l, no rales, wheezes, or rhonchi, respirations unlabored  HEART:  RRR, S1, S2, + systolic murmur  ABDOMEN:  soft, nontender, obese  EXTREMITIES: chronic lymphedema and venous stasis changes b/l LE  NERVOUS SYSTEM: answers questions and follows commands appropriately    LABS:                        12.0   6.19  )-----------( 172      ( 02 Apr 2022 11:08 )             37.0     CBC Full  -  ( 02 Apr 2022 11:08 )  WBC Count : 6.19 K/uL  Hemoglobin : 12.0 g/dL  Hematocrit : 37.0 %  Platelet Count - Automated : 172 K/uL  Mean Cell Volume : 86.7 fl  Mean Cell Hemoglobin : 28.1 pg  Mean Cell Hemoglobin Concentration : 32.4 gm/dL  Auto Neutrophil # : 4.29 K/uL  Auto Lymphocyte # : 0.88 K/uL  Auto Monocyte # : 0.70 K/uL  Auto Eosinophil # : 0.22 K/uL  Auto Basophil # : 0.08 K/uL  Auto Neutrophil % : 69.3 %  Auto Lymphocyte % : 14.2 %  Auto Monocyte % : 11.3 %  Auto Eosinophil % : 3.6 %  Auto Basophil % : 1.3 %    02 Apr 2022 11:08    142    |  104    |  13     ----------------------------<  81     3.6     |  32     |  1.00     Ca    8.8        02 Apr 2022 11:08  Phos  3.4       02 Apr 2022 11:08  Mg     1.9       02 Apr 2022 11:08    TPro  7.2    /  Alb  3.2    /  TBili  0.8    /  DBili  x      /  AST  25     /  ALT  23     /  AlkPhos  73     02 Apr 2022 11:08        CAPILLARY BLOOD GLUCOSE      POCT Blood Glucose.: 77 mg/dL (02 Apr 2022 07:46)  POCT Blood Glucose.: 127 mg/dL (01 Apr 2022 22:50)  POCT Blood Glucose.: 154 mg/dL (01 Apr 2022 19:02)          RADIOLOGY & ADDITIONAL TESTS:    Personally reviewed.     Consultant(s) Notes Reviewed:  [x] YES  [ ] NO

## 2022-04-03 ENCOUNTER — TRANSCRIPTION ENCOUNTER (OUTPATIENT)
Age: 72
End: 2022-04-03

## 2022-04-03 LAB
ALBUMIN SERPL ELPH-MCNC: 2.9 G/DL — LOW (ref 3.3–5)
ALP SERPL-CCNC: 70 U/L — SIGNIFICANT CHANGE UP (ref 40–120)
ALT FLD-CCNC: 19 U/L — SIGNIFICANT CHANGE UP (ref 12–78)
ANION GAP SERPL CALC-SCNC: 7 MMOL/L — SIGNIFICANT CHANGE UP (ref 5–17)
AST SERPL-CCNC: 25 U/L — SIGNIFICANT CHANGE UP (ref 15–37)
BASOPHILS # BLD AUTO: 0.09 K/UL — SIGNIFICANT CHANGE UP (ref 0–0.2)
BASOPHILS NFR BLD AUTO: 1.6 % — SIGNIFICANT CHANGE UP (ref 0–2)
BILIRUB SERPL-MCNC: 0.6 MG/DL — SIGNIFICANT CHANGE UP (ref 0.2–1.2)
BUN SERPL-MCNC: 15 MG/DL — SIGNIFICANT CHANGE UP (ref 7–23)
CALCIUM SERPL-MCNC: 8.3 MG/DL — LOW (ref 8.5–10.1)
CHLORIDE SERPL-SCNC: 104 MMOL/L — SIGNIFICANT CHANGE UP (ref 96–108)
CO2 SERPL-SCNC: 30 MMOL/L — SIGNIFICANT CHANGE UP (ref 22–31)
CREAT SERPL-MCNC: 0.98 MG/DL — SIGNIFICANT CHANGE UP (ref 0.5–1.3)
EGFR: 62 ML/MIN/1.73M2 — SIGNIFICANT CHANGE UP
EOSINOPHIL # BLD AUTO: 0.21 K/UL — SIGNIFICANT CHANGE UP (ref 0–0.5)
EOSINOPHIL NFR BLD AUTO: 3.6 % — SIGNIFICANT CHANGE UP (ref 0–6)
GLUCOSE SERPL-MCNC: 136 MG/DL — HIGH (ref 70–99)
HCT VFR BLD CALC: 37.1 % — SIGNIFICANT CHANGE UP (ref 34.5–45)
HGB BLD-MCNC: 11.8 G/DL — SIGNIFICANT CHANGE UP (ref 11.5–15.5)
IMM GRANULOCYTES NFR BLD AUTO: 0.7 % — SIGNIFICANT CHANGE UP (ref 0–1.5)
LYMPHOCYTES # BLD AUTO: 0.9 K/UL — LOW (ref 1–3.3)
LYMPHOCYTES # BLD AUTO: 15.6 % — SIGNIFICANT CHANGE UP (ref 13–44)
MAGNESIUM SERPL-MCNC: 1.8 MG/DL — SIGNIFICANT CHANGE UP (ref 1.6–2.6)
MCHC RBC-ENTMCNC: 28.2 PG — SIGNIFICANT CHANGE UP (ref 27–34)
MCHC RBC-ENTMCNC: 31.8 GM/DL — LOW (ref 32–36)
MCV RBC AUTO: 88.5 FL — SIGNIFICANT CHANGE UP (ref 80–100)
MONOCYTES # BLD AUTO: 0.68 K/UL — SIGNIFICANT CHANGE UP (ref 0–0.9)
MONOCYTES NFR BLD AUTO: 11.8 % — SIGNIFICANT CHANGE UP (ref 2–14)
NEUTROPHILS # BLD AUTO: 3.86 K/UL — SIGNIFICANT CHANGE UP (ref 1.8–7.4)
NEUTROPHILS NFR BLD AUTO: 66.7 % — SIGNIFICANT CHANGE UP (ref 43–77)
NRBC # BLD: 0 /100 WBCS — SIGNIFICANT CHANGE UP (ref 0–0)
PHOSPHATE SERPL-MCNC: 3.5 MG/DL — SIGNIFICANT CHANGE UP (ref 2.5–4.5)
PLATELET # BLD AUTO: 185 K/UL — SIGNIFICANT CHANGE UP (ref 150–400)
POTASSIUM SERPL-MCNC: 3.9 MMOL/L — SIGNIFICANT CHANGE UP (ref 3.5–5.3)
POTASSIUM SERPL-SCNC: 3.9 MMOL/L — SIGNIFICANT CHANGE UP (ref 3.5–5.3)
PROT SERPL-MCNC: 6.9 G/DL — SIGNIFICANT CHANGE UP (ref 6–8.3)
RBC # BLD: 4.19 M/UL — SIGNIFICANT CHANGE UP (ref 3.8–5.2)
RBC # FLD: 15.1 % — HIGH (ref 10.3–14.5)
SODIUM SERPL-SCNC: 141 MMOL/L — SIGNIFICANT CHANGE UP (ref 135–145)
WBC # BLD: 5.78 K/UL — SIGNIFICANT CHANGE UP (ref 3.8–10.5)
WBC # FLD AUTO: 5.78 K/UL — SIGNIFICANT CHANGE UP (ref 3.8–10.5)

## 2022-04-03 PROCEDURE — 99232 SBSQ HOSP IP/OBS MODERATE 35: CPT

## 2022-04-03 PROCEDURE — 99233 SBSQ HOSP IP/OBS HIGH 50: CPT | Mod: GC

## 2022-04-03 RX ORDER — POTASSIUM CHLORIDE 20 MEQ
40 PACKET (EA) ORAL DAILY
Refills: 0 | Status: DISCONTINUED | OUTPATIENT
Start: 2022-04-03 | End: 2022-04-04

## 2022-04-03 RX ADMIN — ENOXAPARIN SODIUM 40 MILLIGRAM(S): 100 INJECTION SUBCUTANEOUS at 06:07

## 2022-04-03 RX ADMIN — Medication 40 MILLIGRAM(S): at 06:06

## 2022-04-03 RX ADMIN — Medication 10 MILLIGRAM(S): at 06:07

## 2022-04-03 RX ADMIN — Medication 3 MILLIGRAM(S): at 22:06

## 2022-04-03 RX ADMIN — Medication 75 MILLIGRAM(S): at 06:07

## 2022-04-03 RX ADMIN — Medication 1: at 17:02

## 2022-04-03 RX ADMIN — Medication 81 MILLIGRAM(S): at 12:02

## 2022-04-03 RX ADMIN — Medication 40 MILLIEQUIVALENT(S): at 12:02

## 2022-04-03 RX ADMIN — ATORVASTATIN CALCIUM 40 MILLIGRAM(S): 80 TABLET, FILM COATED ORAL at 22:06

## 2022-04-03 RX ADMIN — ENOXAPARIN SODIUM 40 MILLIGRAM(S): 100 INJECTION SUBCUTANEOUS at 17:02

## 2022-04-03 RX ADMIN — INSULIN GLARGINE 20 UNIT(S): 100 INJECTION, SOLUTION SUBCUTANEOUS at 22:06

## 2022-04-03 RX ADMIN — Medication 1: at 22:06

## 2022-04-03 NOTE — DISCHARGE NOTE PROVIDER - DETAILS OF MALNUTRITION DIAGNOSIS/DIAGNOSES
This patient has been assessed with a concern for Malnutrition and was treated during this hospitalization for the following Nutrition diagnosis/diagnoses:     -  04/03/2022: Morbid obesity (BMI > 40)

## 2022-04-03 NOTE — DISCHARGE NOTE PROVIDER - NSDCCPCAREPLAN_GEN_ALL_CORE_FT
PRINCIPAL DISCHARGE DIAGNOSIS  Diagnosis: Acute CHF  Assessment and Plan of Treatment: Congestive heart failure is a chronic condition in which the heart has trouble pumping blood. In some cases of heart failure, fluid may back up into your lungs or you may have swelling (edema) in your lower legs. Symptoms include shortness of breath with activity or when lying flat, cough, swelling of the legs, fatigue, etc. Continue to take Lasix 40 mg daily. Eat heart-healthy foods with low or no trans/saturated fats, cholesterol and salt. Weigh yourself every day for early recognition of fluid accumulation.         PRINCIPAL DISCHARGE DIAGNOSIS  Diagnosis: Acute CHF  Assessment and Plan of Treatment: Congestive heart failure is a chronic condition in which the heart has trouble pumping blood. In some cases of heart failure, fluid may back up into your lungs or you may have swelling (edema) in your lower legs. Symptoms include shortness of breath with activity or when lying flat, cough, swelling of the legs, fatigue, etc. Continue to take Lasix 40 mg daily. Eat heart-healthy foods with low or no trans/saturated fats, cholesterol and salt. Weigh yourself every day for early recognition of fluid accumulation.        SECONDARY DISCHARGE DIAGNOSES  Diagnosis: HTN (hypertension)  Assessment and Plan of Treatment: You have a known history of hypertension, the medical term for high blood pressure. Untreated high blood pressure increases the risk of heart failure, heart attack (myocardial infarction), stroke, and kidney failure. Continue to take Enalapril and Metoprolol to prevent the possible complications of uncontrolled hypertension.   Please also follow up with your primary care physician on a regular basis to make sure your blood pressure continues to be well controlled.     PRINCIPAL DISCHARGE DIAGNOSIS  Diagnosis: Acute CHF  Assessment and Plan of Treatment: Congestive heart failure is a chronic condition in which the heart has trouble pumping blood. In some cases of heart failure, fluid may back up into your lungs or you may have swelling (edema) in your lower legs. Symptoms include shortness of breath with activity or when lying flat, cough, swelling of the legs, fatigue, etc. Please START taking Torsemide 20mg every 12 hours. Eat heart-healthy foods with low or no fats, cholesterol and salt. Weigh yourself every day for early recognition of fluid accumulation. Please follow up with your cardiologist within 1 week of discharge.         SECONDARY DISCHARGE DIAGNOSES  Diagnosis: HTN (hypertension)  Assessment and Plan of Treatment: You have a known history of hypertension. Please START taking Amlodipine 10mg daily and Metoprolol 75 mg daily. Follow up with your PCP and cardiologist on a regular basis to make sure your blood pressure continues to be well controlled.    Diagnosis: Type 2 diabetes mellitus  Assessment and Plan of Treatment: You have a known history of diabetes. Please continue using your own Novolog insulin and Lantus 35 units at night. Follow up with your PCP and endocrinologist regularly.    Diagnosis: Lymphedema  Assessment and Plan of Treatment: You have a chronic history of edema in your lower extremities. Please START taking Torsemide 20mg every 12 hours.

## 2022-04-03 NOTE — PROGRESS NOTE ADULT - SUBJECTIVE AND OBJECTIVE BOX
Patient is a 71y old  Female who presents with a chief complaint of CHF exacerbation (03 Apr 2022 10:38)      INTERVAL HPI/OVERNIGHT EVENTS: No acute events overnight. Patient seen and examined at bedside. Denies any complaints. Admits her shortness of breath is largely improved. Denies fever/chills, chest pain, headache, dizziness, nausea/vomiting, abdominal pain.    MEDICATIONS  (STANDING):  aspirin  chewable 81 milliGRAM(s) Oral daily  atorvastatin 40 milliGRAM(s) Oral at bedtime  dextrose 5%. 1000 milliLiter(s) (50 mL/Hr) IV Continuous <Continuous>  dextrose 5%. 1000 milliLiter(s) (100 mL/Hr) IV Continuous <Continuous>  dextrose 50% Injectable 25 Gram(s) IV Push once  dextrose 50% Injectable 12.5 Gram(s) IV Push once  dextrose 50% Injectable 25 Gram(s) IV Push once  enalapril 10 milliGRAM(s) Oral daily  enoxaparin Injectable 40 milliGRAM(s) SubCutaneous every 12 hours  furosemide   Injectable 40 milliGRAM(s) IV Push daily  glucagon  Injectable 1 milliGRAM(s) IntraMuscular once  insulin glargine Injectable (LANTUS) 20 Unit(s) SubCutaneous at bedtime  insulin lispro (ADMELOG) corrective regimen sliding scale   SubCutaneous three times a day before meals  insulin lispro (ADMELOG) corrective regimen sliding scale   SubCutaneous at bedtime  metoprolol succinate ER 75 milliGRAM(s) Oral daily  potassium chloride    Tablet ER 40 milliEquivalent(s) Oral daily    MEDICATIONS  (PRN):  dextrose Oral Gel 15 Gram(s) Oral once PRN Blood Glucose LESS THAN 70 milliGRAM(s)/deciliter  melatonin 3 milliGRAM(s) Oral at bedtime PRN Insomnia      Allergies    penicillins (Hives)    Intolerances        REVIEW OF SYSTEMS:  CONSTITUTIONAL: No fever or chills  HEENT:  No headache  RESPIRATORY: Shortness of breath largely improved; No cough, wheezing  CARDIOVASCULAR: No chest pain, palpitations  GASTROINTESTINAL: No abd pain, nausea, vomiting  NEUROLOGICAL: No focal weakness or dizziness  MUSCULOSKELETAL: no myalgias     Vital Signs Last 24 Hrs  T(C): 36.3 (03 Apr 2022 04:47), Max: 36.3 (02 Apr 2022 21:32)  T(F): 97.4 (03 Apr 2022 04:47), Max: 97.4 (02 Apr 2022 21:32)  HR: 55 (03 Apr 2022 04:47) (55 - 60)  BP: 114/72 (03 Apr 2022 04:47) (114/72 - 150/65)  BP(mean): --  RR: 18 (03 Apr 2022 04:47) (17 - 18)  SpO2: 91% (03 Apr 2022 04:47) (91% - 91%)    PHYSICAL EXAM:  GENERAL: NAD, well-appearing, pleasant  HEENT:  anicteric, moist mucous membranes  CHEST/LUNG: decreased breath sounds b/l, no rales, wheezes, or rhonchi, respirations unlabored  HEART:  RRR, S1, S2, + systolic murmur  ABDOMEN:  soft, nontender, obese  EXTREMITIES: chronic lymphedema and venous stasis changes b/l LE  NERVOUS SYSTEM: answers questions and follows commands appropriately      LABS:                        11.8   5.78  )-----------( 185      ( 03 Apr 2022 08:02 )             37.1     CBC Full  -  ( 03 Apr 2022 08:02 )  WBC Count : 5.78 K/uL  Hemoglobin : 11.8 g/dL  Hematocrit : 37.1 %  Platelet Count - Automated : 185 K/uL  Mean Cell Volume : 88.5 fl  Mean Cell Hemoglobin : 28.2 pg  Mean Cell Hemoglobin Concentration : 31.8 gm/dL  Auto Neutrophil # : 3.86 K/uL  Auto Lymphocyte # : 0.90 K/uL  Auto Monocyte # : 0.68 K/uL  Auto Eosinophil # : 0.21 K/uL  Auto Basophil # : 0.09 K/uL  Auto Neutrophil % : 66.7 %  Auto Lymphocyte % : 15.6 %  Auto Monocyte % : 11.8 %  Auto Eosinophil % : 3.6 %  Auto Basophil % : 1.6 %    03 Apr 2022 08:02    141    |  104    |  15     ----------------------------<  136    3.9     |  30     |  0.98     Ca    8.3        03 Apr 2022 08:02  Phos  3.5       03 Apr 2022 08:02  Mg     1.8       03 Apr 2022 08:02    TPro  6.9    /  Alb  2.9    /  TBili  0.6    /  DBili  x      /  AST  25     /  ALT  19     /  AlkPhos  70     03 Apr 2022 08:02        CAPILLARY BLOOD GLUCOSE      POCT Blood Glucose.: 145 mg/dL (03 Apr 2022 07:53)  POCT Blood Glucose.: 194 mg/dL (02 Apr 2022 21:11)  POCT Blood Glucose.: 79 mg/dL (02 Apr 2022 16:49)  POCT Blood Glucose.: 85 mg/dL (02 Apr 2022 12:35)          RADIOLOGY & ADDITIONAL TESTS:    Personally reviewed.     Consultant(s) Notes Reviewed:  [x] YES  [ ] NO     Patient is a 71y old  Female who presents with a chief complaint of CHF exacerbation (03 Apr 2022 10:38)      INTERVAL HPI/OVERNIGHT EVENTS: No acute events overnight. Patient seen and examined at bedside. Denies any complaints. Admits her shortness of breath is largely improved. Edema in legs is persistent but improving with the aggressive diuresis. Denies fever/chills, chest pain, headache, dizziness, nausea/vomiting, abdominal pain.    MEDICATIONS  (STANDING):  aspirin  chewable 81 milliGRAM(s) Oral daily  atorvastatin 40 milliGRAM(s) Oral at bedtime  dextrose 5%. 1000 milliLiter(s) (50 mL/Hr) IV Continuous <Continuous>  dextrose 5%. 1000 milliLiter(s) (100 mL/Hr) IV Continuous <Continuous>  dextrose 50% Injectable 25 Gram(s) IV Push once  dextrose 50% Injectable 12.5 Gram(s) IV Push once  dextrose 50% Injectable 25 Gram(s) IV Push once  enalapril 10 milliGRAM(s) Oral daily  enoxaparin Injectable 40 milliGRAM(s) SubCutaneous every 12 hours  furosemide   Injectable 40 milliGRAM(s) IV Push daily  glucagon  Injectable 1 milliGRAM(s) IntraMuscular once  insulin glargine Injectable (LANTUS) 20 Unit(s) SubCutaneous at bedtime  insulin lispro (ADMELOG) corrective regimen sliding scale   SubCutaneous three times a day before meals  insulin lispro (ADMELOG) corrective regimen sliding scale   SubCutaneous at bedtime  metoprolol succinate ER 75 milliGRAM(s) Oral daily  potassium chloride    Tablet ER 40 milliEquivalent(s) Oral daily    MEDICATIONS  (PRN):  dextrose Oral Gel 15 Gram(s) Oral once PRN Blood Glucose LESS THAN 70 milliGRAM(s)/deciliter  melatonin 3 milliGRAM(s) Oral at bedtime PRN Insomnia      Allergies    penicillins (Hives)    Intolerances        REVIEW OF SYSTEMS:  CONSTITUTIONAL: No fever or chills  HEENT:  No headache  RESPIRATORY: Shortness of breath largely improved; No cough, wheezing  CARDIOVASCULAR: No chest pain, palpitations  GASTROINTESTINAL: No abd pain, nausea, vomiting  NEUROLOGICAL: No focal weakness or dizziness  MUSCULOSKELETAL: no myalgias     Vital Signs Last 24 Hrs  T(C): 36.3 (03 Apr 2022 04:47), Max: 36.3 (02 Apr 2022 21:32)  T(F): 97.4 (03 Apr 2022 04:47), Max: 97.4 (02 Apr 2022 21:32)  HR: 55 (03 Apr 2022 04:47) (55 - 60)  BP: 114/72 (03 Apr 2022 04:47) (114/72 - 150/65)  BP(mean): --  RR: 18 (03 Apr 2022 04:47) (17 - 18)  SpO2: 91% (03 Apr 2022 04:47) (91% - 91%)    PHYSICAL EXAM:  GENERAL: NAD, well-appearing, pleasant  HEENT:  anicteric, moist mucous membranes  CHEST/LUNG: decreased breath sounds b/l, no rales, wheezes, or rhonchi, respirations unlabored  HEART:  RRR, S1, S2, + systolic murmur  ABDOMEN:  soft, nontender, obese  EXTREMITIES: chronic lymphedema and venous stasis changes b/l LE  NERVOUS SYSTEM: answers questions and follows commands appropriately      LABS:                        11.8   5.78  )-----------( 185      ( 03 Apr 2022 08:02 )             37.1     CBC Full  -  ( 03 Apr 2022 08:02 )  WBC Count : 5.78 K/uL  Hemoglobin : 11.8 g/dL  Hematocrit : 37.1 %  Platelet Count - Automated : 185 K/uL  Mean Cell Volume : 88.5 fl  Mean Cell Hemoglobin : 28.2 pg  Mean Cell Hemoglobin Concentration : 31.8 gm/dL  Auto Neutrophil # : 3.86 K/uL  Auto Lymphocyte # : 0.90 K/uL  Auto Monocyte # : 0.68 K/uL  Auto Eosinophil # : 0.21 K/uL  Auto Basophil # : 0.09 K/uL  Auto Neutrophil % : 66.7 %  Auto Lymphocyte % : 15.6 %  Auto Monocyte % : 11.8 %  Auto Eosinophil % : 3.6 %  Auto Basophil % : 1.6 %    03 Apr 2022 08:02    141    |  104    |  15     ----------------------------<  136    3.9     |  30     |  0.98     Ca    8.3        03 Apr 2022 08:02  Phos  3.5       03 Apr 2022 08:02  Mg     1.8       03 Apr 2022 08:02    TPro  6.9    /  Alb  2.9    /  TBili  0.6    /  DBili  x      /  AST  25     /  ALT  19     /  AlkPhos  70     03 Apr 2022 08:02        CAPILLARY BLOOD GLUCOSE      POCT Blood Glucose.: 145 mg/dL (03 Apr 2022 07:53)  POCT Blood Glucose.: 194 mg/dL (02 Apr 2022 21:11)  POCT Blood Glucose.: 79 mg/dL (02 Apr 2022 16:49)  POCT Blood Glucose.: 85 mg/dL (02 Apr 2022 12:35)          RADIOLOGY & ADDITIONAL TESTS:    Personally reviewed.     Consultant(s) Notes Reviewed:  [x] YES  [ ] NO

## 2022-04-03 NOTE — DIETITIAN INITIAL EVALUATION ADULT. - ETIOLOGY
related to excessive energy intake and not ready for diet/lifestyle changes related to cardiac dysfunction

## 2022-04-03 NOTE — PROGRESS NOTE ADULT - ASSESSMENT
70 yo F PMHx HTN, DM2,  LBBB, chronic lymphedema presents to ED with sob and cough. Consult for shortness of breath. Mild CHF 2/2 worsening of chronic lymph edema.     ADHF  - Tolerating RA.  Admits to have been diuresing well  - Continue Lasix 40 mg IV QD  - Patient with hx of chronic lymph edema and on compression stockings  - Pro- BNP 90945.  Please, record I/O's and daily weights.  Net neg 2L  - Monitor renal function and electrolytes while aggressively diuresing.    - Replete electrolytes to keep K>4 and Mag>2  - EKG showed sinus rhythm with 1st degree block 63bpm QT/QTc 486/497.  Non-ischemic.  No anginal complaints.    - No further NSVT on tele.  Continue Toprol XL to 75 mg daily with lower parameters.  Can D/C tele  - Contineu Enalapril to 10mg daily  - No previous TTE for comparison  F/U TTE; patient has audible murmur  - BP improved but labile: 110-150.  Monitor routine hemodynamics.    - Will continue to follow.    Kira Zuniga DNP, NP-C  Cardiology   Spectra #[phone]

## 2022-04-03 NOTE — DISCHARGE NOTE PROVIDER - CARE PROVIDER_API CALL
Lon Latham)  Cardiology; Internal Medicine; Nuclear Cardiology  [address], [place] [address]  Phone: [phone]  Fax: [phone]  Follow Up Time: 1 week    Dung Patterson (DO)  Internal Medicine  [address], [place] [address]  Phone: [phone]  Fax: [phone]  Follow Up Time: 1 week   Lon Latham)  Cardiology; Internal Medicine; Nuclear Cardiology  [address], [place] [address]  Phone: [phone]  Fax: [phone]  Follow Up Time: 1 week    Dung Patterson (DO)  Internal Medicine  [address] [place] [address], [place] [address]  Phone: [phone]  Fax: [phone]  Follow Up Time: 1 week    Rigoberto Luong)  Cardio [facility]  [address] [place] [address]  Phone: [phone]  Fax: [phone]  Established Patient  Follow Up Time: 2 weeks

## 2022-04-03 NOTE — DIETITIAN INITIAL EVALUATION ADULT. - PERSON TAUGHT/METHOD
Dash/TLC, Consistent Carbohydrate & HF/Weight loss nutrition therapy. Good understanding. RDs name/phone number left with patient if questions/concerns arise./verbal instruction/written material/patient instructed

## 2022-04-03 NOTE — DISCHARGE NOTE PROVIDER - PROVIDER TOKENS
PROVIDER:[TOKEN:[[id number]],FOLLOWUP:[1 week]],PROVIDER:[TOKEN:[[id number]],FOLLOWUP:[1 week]] PROVIDER:[TOKEN:[[id number]],FOLLOWUP:[1 week]],PROVIDER:[TOKEN:[[id number]],FOLLOWUP:[1 week]],PROVIDER:[TOKEN:[[id number]],FOLLOWUP:[2 weeks],ESTABLISHEDPATIENT:[T]]

## 2022-04-03 NOTE — DIETITIAN INITIAL EVALUATION ADULT. - PROBLEM SELECTOR PLAN 1
-Patient with SOB, orthopnea, elevated proBNP 25747 likely 2/2 ADHF  -S/p IV lasix 40mg with improvement of symptoms  -Denies prior hx of CHF, but is noted to take lasix  -Has had prior TTE but no results in chart  -F/u TTE  -IV lasix 40mg qd per cardio  -Strict I's and O's, monitor volume status closely  -Cardio consulted, recs appreciated

## 2022-04-03 NOTE — DIETITIAN INITIAL EVALUATION ADULT. - SIGNS/SYMPTOMS
as evidenced by BMI 55.1, undesirable food choices (fast foods, frozen entrees) as evidenced by CHF, rome legs 2+edema/gen 1+edema on lasix, elevated pro BNP 78750 yes

## 2022-04-03 NOTE — PROGRESS NOTE ADULT - ASSESSMENT
71 year old F with PMH of HTN, DM2, LBBB, lymphedema admitted for CHF exacerbation 2/2 worsening chronic lymphedema.

## 2022-04-03 NOTE — PROGRESS NOTE ADULT - SUBJECTIVE AND OBJECTIVE BOX
[facility] -- Shyanne Mueller, Howard Zimmer, Carlos Valenzuela Savella, [place]  Office # [phone]    Follow Up:  NSVT    Subjective/Observations: Asleep but arousable.  Remains comfortable on RA.  Denies any form of respiratory or cardiac discomfort.  Admits to have improving BLE edema.  Admits to have been urinating well.  No tele events overnight    REVIEW OF SYSTEMS: All other review of systems is negative unless indicated above  PAST MEDICAL & SURGICAL HISTORY:  Hypertension    Diabetes    Lymphedema    H/O left bundle branch block    History of left bundle branch block (LBBB)    H/O cataract  2020    History of surgical removal of meniscus of knee  left in 1971    Frozen shoulder  2000    H/O Achilles tendon repair  lengthened bilaterally, 2000    Fractured skull  1968    MEDICATIONS  (STANDING):  aspirin  chewable 81 milliGRAM(s) Oral daily  atorvastatin 40 milliGRAM(s) Oral at bedtime  dextrose 5%. 1000 milliLiter(s) (50 mL/Hr) IV Continuous <Continuous>  dextrose 5%. 1000 milliLiter(s) (100 mL/Hr) IV Continuous <Continuous>  dextrose 50% Injectable 25 Gram(s) IV Push once  dextrose 50% Injectable 12.5 Gram(s) IV Push once  dextrose 50% Injectable 25 Gram(s) IV Push once  enalapril 10 milliGRAM(s) Oral daily  enoxaparin Injectable 40 milliGRAM(s) SubCutaneous every 12 hours  furosemide   Injectable 40 milliGRAM(s) IV Push daily  glucagon  Injectable 1 milliGRAM(s) IntraMuscular once  insulin glargine Injectable (LANTUS) 20 Unit(s) SubCutaneous at bedtime  insulin lispro (ADMELOG) corrective regimen sliding scale   SubCutaneous three times a day before meals  insulin lispro (ADMELOG) corrective regimen sliding scale   SubCutaneous at bedtime  metoprolol succinate ER 75 milliGRAM(s) Oral daily    MEDICATIONS  (PRN):  dextrose Oral Gel 15 Gram(s) Oral once PRN Blood Glucose LESS THAN 70 milliGRAM(s)/deciliter  melatonin 3 milliGRAM(s) Oral at bedtime PRN Insomnia    Allergies    penicillins (Hives)    Intolerances    Vital Signs Last 24 Hrs  T(C): 36.3 (03 Apr 2022 04:47), Max: 36.5 (02 Apr 2022 11:40)  T(F): 97.4 (03 Apr 2022 04:47), Max: 97.7 (02 Apr 2022 11:40)  HR: 55 (03 Apr 2022 04:47) (55 - 60)  BP: 114/72 (03 Apr 2022 04:47) (114/72 - 153/69)  BP(mean): --  RR: 18 (03 Apr 2022 04:47) (17 - 18)  SpO2: 91% (03 Apr 2022 04:47) (91% - 92%)  I&O's Summary    02 Apr 2022 07:01  -  03 Apr 2022 07:00  --------------------------------------------------------  IN: 180 mL / OUT: 2200 mL / NET: -2020 mL    PHYSICAL EXAM:  TELE: SB  Constitutional: NAD, awake and alert, morbidly obese  HEENT: Moist Mucous Membranes, Anicteric  Pulmonary: Non-labored, breath sounds are diminished bilaterally, No wheezing, rales or rhonchi  Cardiovascular: Regular, S1 and S2, + murmurs, no rubs, gallops or clicks  Gastrointestinal: Bowel Sounds present, soft, nontender.   Lymph: +2 BLE edema. +BLE lymphedema R>L  Skin: No visible rashes. +chronic skin changes BLE.  Psych:  Mood & affect appropriate    LABS: All Labs Reviewed:                        11.8   5.78  )-----------( 185      ( 03 Apr 2022 08:02 )             37.1                         12.0   6.19  )-----------( 172      ( 02 Apr 2022 11:08 )             37.0                         12.4   7.47  )-----------( 219      ( 01 Apr 2022 16:00 )             39.0     03 Apr 2022 08:02    141    |  104    |  15     ----------------------------<  136    3.9     |  30     |  0.98   02 Apr 2022 11:08    142    |  104    |  13     ----------------------------<  81     3.6     |  32     |  1.00   01 Apr 2022 16:00    139    |  103    |  14     ----------------------------<  107    4.6     |  30     |  0.99     Ca    8.3        03 Apr 2022 08:02  Ca    8.8        02 Apr 2022 11:08  Ca    8.6        01 Apr 2022 16:00  Phos  3.5       03 Apr 2022 08:02  Phos  3.4       02 Apr 2022 11:08  Mg     1.8       03 Apr 2022 08:02  Mg     1.9       02 Apr 2022 11:08    TPro  6.9    /  Alb  2.9    /  TBili  0.6    /  DBili  x      /  AST  25     /  ALT  19     /  AlkPhos  70     03 Apr 2022 08:02  TPro  7.2    /  Alb  3.2    /  TBili  0.8    /  DBili  x      /  AST  25     /  ALT  23     /  AlkPhos  73     02 Apr 2022 11:08  TPro  7.8    /  Alb  3.5    /  TBili  0.9    /  DBili  x      /  AST  29     /  ALT  22     /  AlkPhos  78     01 Apr 2022 16:00    ACC: [id number] EXAM:  XR CHEST PA LAT 2V                          PROCEDURE DATE:  04/01/2022      INTERPRETATION:  Chest pain short of breath.    PA lateral. Prior 7/2/2021.    Low lung volumes. Cardiomegaly with left ventricular configuration   similar to prior. Nonspecific chronic accentuation bibasilar   bronchovascular markings similar to prior. No focal consolidation.   Possible trace left pleural effusion.    IMPRESSION: Mild cardiomegaly. No active infiltrates. Possible trace left   pleural effusion.    --- End of Report ---    MARIAN KLINE MD; Attending Radiologist  This document has been electronically signed. Apr 1 2022  4:41PM    Ventricular Rate 63 BPM    Atrial Rate 63 BPM    P-R Interval 256 ms    QRS Duration 160 ms    Q-T Interval 486 ms    QTC Calculation(Bazett) 497 ms    P Axis 67 degrees    R Axis -40 degrees    T Axis 147 degrees    Diagnosis Line Sinus rhythm with 1st degree AV block  Left axis deviation  Left bundle branch block  Abnormal ECG  Confirmed by JAKE VALENZUELA ([id number]) on 4/2/2022 11:19:23 AM

## 2022-04-03 NOTE — PROGRESS NOTE ADULT - ATTENDING COMMENTS
The patient was seen and evaluated independently by the attending physician.  - I have personally reviewed the pt's labs, imaging, micro data and consultant recommendations.    #acute chf exacerbation, f/u tte  #chronic lymphedema  #HTN  #diabetes    - c/w iv lasix   - monitor lytes closely. aggressive repletion of lyte derangements   - daily weights  - strict i/o's  - lymphedema is chronic  - met w/ pt and family at bedside  - dc planning. anticipate home in next 24-48hrs
The patient was seen and evaluated independently by the attending physician.  - I have personally reviewed the pt's labs, imaging, micro data and consultant recommendations.    #acute chf exacerbation, f/u tte  #chronic lymphedema  #HTN  #diabetes    - c/w iv lasix   - monitor lytes closely  - daily weights  - strict i/o's  - lymphedema is chronic  - met w/ pt and family at bedside

## 2022-04-03 NOTE — DISCHARGE NOTE PROVIDER - CARE PROVIDERS DIRECT ADDRESSES
,DirectAddress_Unknown,DirectAddress_Unknown ,DirectAddress_Unknown,DirectAddress_Unknown,[email or web address]

## 2022-04-03 NOTE — DIETITIAN NUTRITION RISK NOTIFICATION - TREATMENT: THE FOLLOWING DIET HAS BEEN RECOMMENDED
Diet, DASH/TLC:   Sodium & Cholesterol Restricted  Consistent Carbohydrate {Evening Snack} (04-01-22 @ 19:35) [Active]

## 2022-04-03 NOTE — DISCHARGE NOTE PROVIDER - NSDCFUADDAPPT_GEN_ALL_CORE_FT
Our team made you an appointment with the cardiology practice that assessed you in the hospital and your expressed interest in following up with this cardiology group.   Appt time:

## 2022-04-03 NOTE — PROGRESS NOTE ADULT - PROBLEM SELECTOR PLAN 3
- Decrease to Enalapril 10mg and increase to Toprol XL 75mg qd  - Monitor routine hemodynamics
- Decrease to Enalapril 10mg and increase to Toprol XL 75mg qd  - Monitor routine hemodynamics

## 2022-04-03 NOTE — DISCHARGE NOTE PROVIDER - HOSPITAL COURSE
ADMISSION H+P:    HPI:  Patient is 72 yo F PMHx HTN, DM2, LBBB, lymphedema who presents to the ED with SOB x1 week. States SOB present with both exertion and rest. Also reports productive cough. Denies hx of CHF, though she is noted to take lasix and reports orthopnea; she requires two pillows to sleep. At this time, patient is sitting in bed, in NAD, states SOB is much improved s/p IV lasix.    In the ED, temp 98.4, HR 68, /66, O2 20, RR 96%   Labs were significant for glucose 107, probnp [id number]  EKG: SR with 1st degree block, LBBB, unchanged from prior  CXR: Mild cardiomegaly. No active infiltrates. Possible trace left pleural effusion.  S/p IV lasix 40mg   (01 Apr 2022 18:37)      ---  HOSPITAL COURSE:   Patient was admitted for CHF exacerbation in the setting of worsening chronic lower extremity lymphadenopathy. Cardiology was consulted and patient was treated with IV Lasix 40mg daily with improvement in symptoms.    Patient was medically optimized and improved clinically throughout hospital course. Patient seen and examined on day of discharge.    Vital Signs  T(C): 36.3 (03 Apr 2022 04:47), Max: 36.5 (02 Apr 2022 11:40)  T(F): 97.4 (03 Apr 2022 04:47), Max: 97.7 (02 Apr 2022 11:40)  HR: 55 (03 Apr 2022 04:47) (55 - 60)  BP: 114/72 (03 Apr 2022 04:47) (114/72 - 153/69)  RR: 18 (03 Apr 2022 04:47) (17 - 18)  SpO2: 91% (03 Apr 2022 04:47) (91% - 92%)    Physical Exam:  General: well-developed, well-nourished, NAD  HEENT: normocephalic, atraumatic, EOMI, moist mucous membranes   Neck: supple, non-tender, no masses  Neurology: AAOx3, sensation intact  Respiratory: clear to auscultation bilaterally; no wheezes, rhonchi, or rales  CV: regular rate and rhythm, soft S1/S2, no murmurs, rubs, or gallops  Abdominal: soft, non-tender, non-distended, bowel sounds present  Extremities: no clubbing, cyanosis, or edema; palpable peripheral pulses  Musculoskeletal: no joint erythema or warmth, no joint swelling   Skin: warm, dry, normal color    Patient is medically stable for discharge to ____ with outpatient follow up.  ---  CONSULTANTS:   Cardiology: [facility]  ---  TIME SPENT:   I, the attending physician, was physically present for the key portions of the evaluation and management (E/M) service provided. The total amount of time spent reviewing the hospital course, laboratory values, imaging findings, assessing/counseling the patient, discussing with consultant physicians, social work, nursing staff was -- minutes.     ---  FINAL DISCHARGE DIAGNOSIS LIST:  Please see last daily progress note for final discharge diagnoses    ---  Primary care provider was made aware of plan for discharge:  [    ] NO     [     ] YES       ADMISSION H+P:    HPI:  Patient is 70 yo F PMHx HTN, DM2, LBBB, lymphedema who presents to the ED with SOB x1 week. States SOB present with both exertion and rest. Also reports productive cough. Denies hx of CHF, though she is noted to take lasix and reports orthopnea; she requires two pillows to sleep. At this time, patient is sitting in bed, in NAD, states SOB is much improved s/p IV lasix.    In the ED, temp 98.4, HR 68, /66, O2 20, RR 96%   Labs were significant for glucose 107, probnp [id number]  EKG: SR with 1st degree block, LBBB, unchanged from prior  CXR: Mild cardiomegaly. No active infiltrates. Possible trace left pleural effusion.  S/p IV lasix 40mg   (01 Apr 2022 18:37)      ---  HOSPITAL COURSE:   Patient was admitted for CHF exacerbation in the setting of worsening chronic lower extremity lymphadenopathy. Cardiology was consulted and patient was treated with IV Lasix 40mg daily and KCl supplementation with improvement in symptoms.     Patient was medically optimized and improved clinically throughout hospital course. Patient seen and examined on day of discharge.    Vital Signs    Physical Exam:    Patient is medically stable for discharge to ____ with outpatient follow up.  ---  CONSULTANTS:   Cardiology: [facility]  ---  TIME SPENT:   I, the attending physician, was physically present for the key portions of the evaluation and management (E/M) service provided. The total amount of time spent reviewing the hospital course, laboratory values, imaging findings, assessing/counseling the patient, discussing with consultant physicians, social work, nursing staff was -- minutes.     ---  FINAL DISCHARGE DIAGNOSIS LIST:  Please see last daily progress note for final discharge diagnoses       ADMISSION H+P:    HPI:  Patient is 70 yo F PMHx HTN, DM2, LBBB, lymphedema who presents to the ED with SOB x1 week. States SOB present with both exertion and rest. Also reports productive cough. Denies hx of CHF, though she is noted to take lasix and reports orthopnea; she requires two pillows to sleep. At this time, patient is sitting in bed, in NAD, states SOB is much improved s/p IV lasix.    In the ED, temp 98.4, HR 68, /66, O2 20, RR 96%   Labs were significant for glucose 107, probnp [id number]  EKG: SR with 1st degree block, LBBB, unchanged from prior  CXR: Mild cardiomegaly. No active infiltrates. Possible trace left pleural effusion.  S/p IV lasix 40mg   (01 Apr 2022 18:37)      ---  HOSPITAL COURSE:   Patient was admitted for CHF exacerbation in the setting of worsening chronic lower extremity lymphadenopathy. Cardiology was consulted and patient was treated with IV Lasix 40mg daily and KCl supplementation with improvement in symptoms. Prior to discharge, Lasix was switched to Torsemide per cardiology's recommendation for further diuresis.     Patient was medically optimized and improved clinically throughout hospital course. Patient seen and examined on day of discharge.    VITALS:   T(C): 36.2 (04-04-22 @ 04:54), Max: 36.6 (04-03-22 @ 16:47)  HR: 61 (04-04-22 @ 04:54) (54 - 61)  BP: 148/79 (04-04-22 @ 04:54) (135/66 - 172/67)  RR: 19 (04-04-22 @ 04:54) (17 - 19)  SpO2: 92% (04-04-22 @ 04:54) (91% - 92%)    GENERAL: NAD, lying in bed comfortably  HEAD:  Atraumatic, Normocephalic  EYES: EOMI, PERRLA, conjunctiva and sclera clear  ENT: Moist mucous membranes  NECK: Supple, No JVD  CHEST/LUNG: Clear to auscultation bilaterally; No rales, rhonchi, wheezing, or rubs. Unlabored respirations  HEART:  RRR, S1, S2, + systolic murmur  ABDOMEN: BSx4; Soft, nontender, nondistended  EXTREMITIES:  2+ Peripheral Pulses, chronic lymphedema and venous stasis changes b/l LE  NERVOUS SYSTEM:  A&Ox3,  answers questions and follows commands appropriately    Patient is medically stable for discharge home with close outpatient follow up with cardiologist.   ---  CONSULTANTS:   Cardiology: [facility]  ---  TIME SPENT:   I, the attending physician, was physically present for the key portions of the evaluation and management (E/M) service provided. The total amount of time spent reviewing the hospital course, laboratory values, imaging findings, assessing/counseling the patient, discussing with consultant physicians, social work, nursing staff was -- minutes.     ---  FINAL DISCHARGE DIAGNOSIS LIST:  Please see last daily progress note for final discharge diagnoses       ADMISSION H+P:    HPI:  Patient is 72 yo F PMHx HTN, DM2, LBBB, lymphedema who presents to the ED with SOB x1 week. States SOB present with both exertion and rest. Also reports productive cough. Denies hx of CHF, though she is noted to take lasix and reports orthopnea; she requires two pillows to sleep. At this time, patient is sitting in bed, in NAD, states SOB is much improved s/p IV lasix.    In the ED, temp 98.4, HR 68, /66, O2 20, RR 96%   Labs were significant for glucose 107, probnp [id number]  EKG: SR with 1st degree block, LBBB, unchanged from prior  CXR: Mild cardiomegaly. No active infiltrates. Possible trace left pleural effusion.  S/p IV lasix 40mg   (01 Apr 2022 18:37)      ---  HOSPITAL COURSE:   Patient was admitted for CHF exacerbation in the setting of worsening chronic lower extremity lymphadenopathy. Cardiology was consulted and patient was treated with IV Lasix 40mg daily and KCl supplementation with improvement in symptoms. An Echo was performed which showed ________.Prior to discharge, Lasix was switched to Torsemide per cardiology's recommendation for further diuresis.     Patient was medically optimized and improved clinically throughout hospital course. Patient seen and examined on day of discharge.    VITALS:   T(C): 36.2 (04-04-22 @ 04:54), Max: 36.6 (04-03-22 @ 16:47)  HR: 61 (04-04-22 @ 04:54) (54 - 61)  BP: 148/79 (04-04-22 @ 04:54) (135/66 - 172/67)  RR: 19 (04-04-22 @ 04:54) (17 - 19)  SpO2: 92% (04-04-22 @ 04:54) (91% - 92%)    GENERAL: NAD, lying in bed comfortably  HEAD:  Atraumatic, Normocephalic  EYES: EOMI, PERRLA, conjunctiva and sclera clear  ENT: Moist mucous membranes  NECK: Supple, No JVD  CHEST/LUNG: Clear to auscultation bilaterally; No rales, rhonchi, wheezing, or rubs. Unlabored respirations  HEART:  RRR, S1, S2, + systolic murmur  ABDOMEN: BSx4; Soft, nontender, nondistended  EXTREMITIES:  2+ Peripheral Pulses, chronic lymphedema and venous stasis changes b/l LE  NERVOUS SYSTEM:  A&Ox3,  answers questions and follows commands appropriately    Patient is medically stable for discharge home with close outpatient follow up with cardiologist.   ---  CONSULTANTS:   Cardiology: [facility]  ---  TIME SPENT:   I, the attending physician, was physically present for the key portions of the evaluation and management (E/M) service provided. The total amount of time spent reviewing the hospital course, laboratory values, imaging findings, assessing/counseling the patient, discussing with consultant physicians, social work, nursing staff was -- minutes.     ---  FINAL DISCHARGE DIAGNOSIS LIST:  Please see last daily progress note for final discharge diagnoses       ADMISSION H+P:    HPI:  Patient is 72 yo F PMHx HTN, DM2, LBBB, lymphedema who presents to the ED with SOB x1 week. States SOB present with both exertion and rest. Also reports productive cough. Denies hx of CHF, though she is noted to take lasix and reports orthopnea; she requires two pillows to sleep.     ---  HOSPITAL COURSE:   Patient was admitted for CHF exacerbation in the setting of worsening chronic lower extremity lymphadenopathy. Cardiology was consulted and patient was treated with IV Lasix 40mg daily and KCl supplementation with improvement in symptoms. An Echo was performed which showed ________. Prior to discharge, Lasix was switched to Torsemide per cardiology's recommendation for further diuresis.     Patient was medically optimized and improved clinically throughout hospital course. Patient seen and examined on day of discharge.    VITALS:   T(C): 36.2 (04-04-22 @ 04:54), Max: 36.6 (04-03-22 @ 16:47)  HR: 61 (04-04-22 @ 04:54) (54 - 61)  BP: 148/79 (04-04-22 @ 04:54) (135/66 - 172/67)  RR: 19 (04-04-22 @ 04:54) (17 - 19)  SpO2: 92% (04-04-22 @ 04:54) (91% - 92%)    GENERAL: NAD, lying in bed comfortably  HEAD:  Atraumatic, Normocephalic  EYES: EOMI, PERRLA, conjunctiva and sclera clear  ENT: Moist mucous membranes  NECK: Supple, No JVD  CHEST/LUNG: Clear to auscultation bilaterally; No rales, rhonchi, wheezing, or rubs. Unlabored respirations  HEART:  RRR, S1, S2, + systolic murmur  ABDOMEN: BSx4; Soft, nontender, nondistended  EXTREMITIES:  2+ Peripheral Pulses, chronic lymphedema and venous stasis changes b/l LE  NERVOUS SYSTEM:  A&Ox3,  answers questions and follows commands appropriately    Patient is medically stable for discharge home with close outpatient follow up with cardiologist.   ---  CONSULTANTS:   Cardiology: [facility]  ---  TIME SPENT:   I, the attending physician, was physically present for the key portions of the evaluation and management (E/M) service provided. The total amount of time spent reviewing the hospital course, laboratory values, imaging findings, assessing/counseling the patient, discussing with consultant physicians, social work, nursing staff was -- minutes.     ---  FINAL DISCHARGE DIAGNOSIS LIST:  Please see last daily progress note for final discharge diagnoses       ADMISSION H+P:    HPI:  Patient is 70 yo F PMHx HTN, DM2, LBBB, lymphedema who presents to the ED with SOB x1 week. States SOB present with both exertion and rest. Also reports productive cough. Denies hx of CHF, though she is noted to take lasix and reports orthopnea; she requires two pillows to sleep.     ---  HOSPITAL COURSE:   Patient was admitted for CHF exacerbation in the setting of worsening chronic lower extremity lymphadenopathy. Cardiology was consulted and patient was treated with IV Lasix 40mg daily and KCl supplementation with improvement in symptoms. An Echo was performed and prelim read showed EF 40-45% . Prior to discharge, Lasix was switched to Torsemide per cardiology's recommendation for further diuresis.     Patient was medically optimized and improved clinically throughout hospital course. Patient seen and examined on day of discharge.    VITALS:   T(C): 36.2 (04-04-22 @ 04:54), Max: 36.6 (04-03-22 @ 16:47)  HR: 61 (04-04-22 @ 04:54) (54 - 61)  BP: 148/79 (04-04-22 @ 04:54) (135/66 - 172/67)  RR: 19 (04-04-22 @ 04:54) (17 - 19)  SpO2: 92% (04-04-22 @ 04:54) (91% - 92%)    GENERAL: NAD, lying in bed comfortably  HEAD:  Atraumatic, Normocephalic  EYES: EOMI, PERRLA, conjunctiva and sclera clear  ENT: Moist mucous membranes  NECK: Supple, No JVD  CHEST/LUNG: Clear to auscultation bilaterally; No rales, rhonchi, wheezing, or rubs. Unlabored respirations  HEART:  RRR, S1, S2, + systolic murmur  ABDOMEN: BSx4; Soft, nontender, nondistended  EXTREMITIES:  2+ Peripheral Pulses, chronic lymphedema and venous stasis changes b/l LE  NERVOUS SYSTEM:  A&Ox3,  answers questions and follows commands appropriately    Patient is medically stable for discharge home with close outpatient follow up with cardiologist.   ---  CONSULTANTS:   Cardiology: [facility]  ---  TIME SPENT:   I, the attending physician, was physically present for the key portions of the evaluation and management (E/M) service provided. The total amount of time spent reviewing the hospital course, laboratory values, imaging findings, assessing/counseling the patient, discussing with consultant physicians, social work, nursing staff was -- minutes.     ---  FINAL DISCHARGE DIAGNOSIS LIST:  Please see last daily progress note for final discharge diagnoses       ADMISSION H+P:    HPI:  Patient is 70 yo F PMHx HTN, DM2, LBBB, lymphedema who presents to the ED with SOB x1 week. States SOB present with both exertion and rest. Also reports productive cough. Denies hx of CHF, though she is noted to take lasix and reports orthopnea; she requires two pillows to sleep.     ---  HOSPITAL COURSE:   Patient was admitted for CHF exacerbation in the setting of worsening chronic lower extremity lymphadenopathy. Cardiology was consulted and patient was treated with IV Lasix 40mg daily and KCl supplementation with improvement in symptoms. An Echo was performed and prelim read showed EF 40-45% . Prior to discharge, Lasix was switched to Torsemide per cardiology's recommendation for further diuresis. Close outpatient follow up with cardiology was arranged.     Patient was medically optimized and improved clinically throughout hospital course. Patient seen and examined on day of discharge. No new complaints. Eager for dc planning.     VITALS:   T(C): 36.2 (04-04-22 @ 04:54), Max: 36.6 (04-03-22 @ 16:47)  HR: 61 (04-04-22 @ 04:54) (54 - 61)  BP: 148/79 (04-04-22 @ 04:54) (135/66 - 172/67)  RR: 19 (04-04-22 @ 04:54) (17 - 19)  SpO2: 92% (04-04-22 @ 04:54) (91% - 92%)    GENERAL: NAD, lying in bed comfortably   EYES: EOMI, PERRLA, conjunctiva and sclera clear   CHEST/LUNG: Clear to auscultation bilaterally; No rales, rhonchi, wheezing, or rubs. Unlabored respirations  HEART:  RRR, S1, S2, + systolic murmur  ABDOMEN: BSx4; Soft, nontender, nondistended  EXTREMITIES:  2+ Peripheral Pulses, chronic lymphedema and venous stasis changes b/l LE  NERVOUS SYSTEM:  A&Ox3,  answers questions and follows commands appropriately    Patient is medically stable for discharge home with close outpatient follow up with cardiologist.     ---  CONSULTANTS:   Cardiology: [facility]    ---  TIME SPENT:   I, the attending physician, was physically present for the key portions of the evaluation and management (E/M) service provided. The total amount of time spent reviewing the hospital course, laboratory values, imaging findings, assessing/counseling the patient, discussing with consultant physicians, social work, nursing staff was 46 minutes.     ---  FINAL DISCHARGE DIAGNOSIS LIST:  Please see last daily progress note for final discharge diagnoses

## 2022-04-03 NOTE — PROGRESS NOTE ADULT - PROBLEM SELECTOR PLAN 4
- Hold home regimen: Lantus 35u qhs, novolog own sliding scale  - Continue Lantus 20u qhs and ISS  - Hypoglycemia protocol
- Hold home regimen: Lantus 35u qhs, novolog own sliding scale  - Continue Lantus 20u qhs and ISS  - Hypoglycemia protocol

## 2022-04-03 NOTE — PROGRESS NOTE ADULT - PROBLEM SELECTOR PLAN 2
Improving  - Patient reports cough x1 week, doubt infectious etiology  - Afebrile, no leukocytosis  - Monitor for fevers, monitor CBC
Improving  - Patient reports cough x1 week, doubt infectious etiology  - Afebrile, no leukocytosis  - Monitor for fevers, monitor CBC

## 2022-04-03 NOTE — PROGRESS NOTE ADULT - PROBLEM SELECTOR PLAN 1
Patient with no known hx of CHF presented with SOB, orthopnea, elevated proBNP 68099 in the setting of worsening lymphedema  - S/p IV Lasix 40mg x1 with improvement of symptoms  - F/u TTE  - Continue IV Lasix 40mg qd  - Patient follows with Cardio Dr. Lon Latham at [facility]  - Strict I&Os, monitor volume status  - Monitor and replete lytes PRN  - Cardio consulted, recs appreciated Patient with no known hx of CHF presented with SOB, orthopnea, elevated proBNP 96619 in the setting of worsening lymphedema   - F/u TTE  - Continue IV Lasix 40mg qd  - Patient follows with Cardio Dr. Lon Latham at [facility]  - Strict I&Os, monitor volume status  - Monitor and replete lytes PRN  - Cardio consulted, recs appreciated

## 2022-04-03 NOTE — DISCHARGE NOTE PROVIDER - NSDCMRMEDTOKEN_GEN_ALL_CORE_FT
aspirin 81 mg oral tablet, chewable: 1 tab(s) orally once a day  atorvastatin 40 mg oral tablet: 1 tab(s) orally once a day  enalapril 20 mg oral tablet: 1 tab(s) orally once a day  furosemide 40 mg oral tablet: 1 tab(s) orally once a day  Klor-Con 10 mEq oral tablet, extended release: 1 tab(s) orally once a day  Lantus: 35 unit(s) subcutaneous once a day (at bedtime)  Metoprolol Succinate ER 50 mg oral tablet, extended release: 1 tab(s) orally once a day  NovoLOG: patient&#x27;s own sliding scale   aspirin 81 mg oral tablet, chewable: 1 tab(s) orally once a day  atorvastatin 40 mg oral tablet: 1 tab(s) orally once a day  enalapril 10 mg oral tablet: 1 tab(s) orally once a day  Klor-Con 10 mEq oral tablet, extended release: 1 tab(s) orally once a day  Lantus: 35 unit(s) subcutaneous once a day (at bedtime)  Metoprolol Succinate ER 25 mg oral tablet, extended release: 3 tab(s) orally once a day  NovoLOG: patient&#x27;s own sliding scale  Soaanz 20 mg oral tablet: 1 tab(s) orally every 12 hours    aspirin 81 mg oral tablet, chewable: 1 tab(s) orally once a day  atorvastatin 40 mg oral tablet: 1 tab(s) orally once a day  enalapril 10 mg oral tablet: 1 tab(s) orally once a day  Lantus: 35 unit(s) subcutaneous once a day (at bedtime)  Metoprolol Succinate ER 25 mg oral tablet, extended release: 3 tab(s) orally once a day  NovoLOG: patient&#x27;s own sliding scale  potassium chloride 10 mEq oral capsule, extended release: 2 cap(s) orally once a day   Soaanz 20 mg oral tablet: 1 tab(s) orally every 12 hours

## 2022-04-04 ENCOUNTER — TRANSCRIPTION ENCOUNTER (OUTPATIENT)
Age: 72
End: 2022-04-04

## 2022-04-04 VITALS
OXYGEN SATURATION: 98 % | RESPIRATION RATE: 18 BRPM | DIASTOLIC BLOOD PRESSURE: 67 MMHG | TEMPERATURE: 98 F | HEART RATE: 53 BPM | SYSTOLIC BLOOD PRESSURE: 133 MMHG

## 2022-04-04 LAB
ALBUMIN SERPL ELPH-MCNC: 3.1 G/DL — LOW (ref 3.3–5)
ALP SERPL-CCNC: 72 U/L — SIGNIFICANT CHANGE UP (ref 40–120)
ALT FLD-CCNC: 20 U/L — SIGNIFICANT CHANGE UP (ref 12–78)
ANION GAP SERPL CALC-SCNC: 6 MMOL/L — SIGNIFICANT CHANGE UP (ref 5–17)
AST SERPL-CCNC: 22 U/L — SIGNIFICANT CHANGE UP (ref 15–37)
BASOPHILS # BLD AUTO: 0.07 K/UL — SIGNIFICANT CHANGE UP (ref 0–0.2)
BASOPHILS NFR BLD AUTO: 1.1 % — SIGNIFICANT CHANGE UP (ref 0–2)
BILIRUB SERPL-MCNC: 0.8 MG/DL — SIGNIFICANT CHANGE UP (ref 0.2–1.2)
BUN SERPL-MCNC: 15 MG/DL — SIGNIFICANT CHANGE UP (ref 7–23)
CALCIUM SERPL-MCNC: 8.4 MG/DL — LOW (ref 8.5–10.1)
CHLORIDE SERPL-SCNC: 103 MMOL/L — SIGNIFICANT CHANGE UP (ref 96–108)
CO2 SERPL-SCNC: 32 MMOL/L — HIGH (ref 22–31)
CREAT SERPL-MCNC: 1.1 MG/DL — SIGNIFICANT CHANGE UP (ref 0.5–1.3)
EGFR: 54 ML/MIN/1.73M2 — LOW
EOSINOPHIL # BLD AUTO: 0.3 K/UL — SIGNIFICANT CHANGE UP (ref 0–0.5)
EOSINOPHIL NFR BLD AUTO: 4.6 % — SIGNIFICANT CHANGE UP (ref 0–6)
GLUCOSE SERPL-MCNC: 170 MG/DL — HIGH (ref 70–99)
HCT VFR BLD CALC: 39.1 % — SIGNIFICANT CHANGE UP (ref 34.5–45)
HGB BLD-MCNC: 12.5 G/DL — SIGNIFICANT CHANGE UP (ref 11.5–15.5)
IMM GRANULOCYTES NFR BLD AUTO: 0.8 % — SIGNIFICANT CHANGE UP (ref 0–1.5)
LYMPHOCYTES # BLD AUTO: 1.14 K/UL — SIGNIFICANT CHANGE UP (ref 1–3.3)
LYMPHOCYTES # BLD AUTO: 17.3 % — SIGNIFICANT CHANGE UP (ref 13–44)
MAGNESIUM SERPL-MCNC: 2 MG/DL — SIGNIFICANT CHANGE UP (ref 1.6–2.6)
MCHC RBC-ENTMCNC: 28.4 PG — SIGNIFICANT CHANGE UP (ref 27–34)
MCHC RBC-ENTMCNC: 32 GM/DL — SIGNIFICANT CHANGE UP (ref 32–36)
MCV RBC AUTO: 88.9 FL — SIGNIFICANT CHANGE UP (ref 80–100)
MONOCYTES # BLD AUTO: 0.69 K/UL — SIGNIFICANT CHANGE UP (ref 0–0.9)
MONOCYTES NFR BLD AUTO: 10.5 % — SIGNIFICANT CHANGE UP (ref 2–14)
NEUTROPHILS # BLD AUTO: 4.34 K/UL — SIGNIFICANT CHANGE UP (ref 1.8–7.4)
NEUTROPHILS NFR BLD AUTO: 65.7 % — SIGNIFICANT CHANGE UP (ref 43–77)
NRBC # BLD: 0 /100 WBCS — SIGNIFICANT CHANGE UP (ref 0–0)
PHOSPHATE SERPL-MCNC: 3.5 MG/DL — SIGNIFICANT CHANGE UP (ref 2.5–4.5)
PLATELET # BLD AUTO: 233 K/UL — SIGNIFICANT CHANGE UP (ref 150–400)
POTASSIUM SERPL-MCNC: 4 MMOL/L — SIGNIFICANT CHANGE UP (ref 3.5–5.3)
POTASSIUM SERPL-SCNC: 4 MMOL/L — SIGNIFICANT CHANGE UP (ref 3.5–5.3)
PROT SERPL-MCNC: 7.2 G/DL — SIGNIFICANT CHANGE UP (ref 6–8.3)
RBC # BLD: 4.4 M/UL — SIGNIFICANT CHANGE UP (ref 3.8–5.2)
RBC # FLD: 15 % — HIGH (ref 10.3–14.5)
SODIUM SERPL-SCNC: 141 MMOL/L — SIGNIFICANT CHANGE UP (ref 135–145)
WBC # BLD: 6.59 K/UL — SIGNIFICANT CHANGE UP (ref 3.8–10.5)
WBC # FLD AUTO: 6.59 K/UL — SIGNIFICANT CHANGE UP (ref 3.8–10.5)

## 2022-04-04 PROCEDURE — 71046 X-RAY EXAM CHEST 2 VIEWS: CPT

## 2022-04-04 PROCEDURE — 99239 HOSP IP/OBS DSCHRG MGMT >30: CPT

## 2022-04-04 PROCEDURE — 82962 GLUCOSE BLOOD TEST: CPT

## 2022-04-04 PROCEDURE — 93306 TTE W/DOPPLER COMPLETE: CPT | Mod: 26

## 2022-04-04 PROCEDURE — 85025 COMPLETE CBC W/AUTO DIFF WBC: CPT

## 2022-04-04 PROCEDURE — 83735 ASSAY OF MAGNESIUM: CPT

## 2022-04-04 PROCEDURE — 99285 EMERGENCY DEPT VISIT HI MDM: CPT

## 2022-04-04 PROCEDURE — 83880 ASSAY OF NATRIURETIC PEPTIDE: CPT

## 2022-04-04 PROCEDURE — 84484 ASSAY OF TROPONIN QUANT: CPT

## 2022-04-04 PROCEDURE — 87637 SARSCOV2&INF A&B&RSV AMP PRB: CPT

## 2022-04-04 PROCEDURE — 80053 COMPREHEN METABOLIC PANEL: CPT

## 2022-04-04 PROCEDURE — 99232 SBSQ HOSP IP/OBS MODERATE 35: CPT

## 2022-04-04 PROCEDURE — 97161 PT EVAL LOW COMPLEX 20 MIN: CPT

## 2022-04-04 PROCEDURE — 83036 HEMOGLOBIN GLYCOSYLATED A1C: CPT

## 2022-04-04 PROCEDURE — C8929: CPT

## 2022-04-04 PROCEDURE — 99497 ADVNCD CARE PLAN 30 MIN: CPT

## 2022-04-04 PROCEDURE — 84100 ASSAY OF PHOSPHORUS: CPT

## 2022-04-04 PROCEDURE — 36415 COLL VENOUS BLD VENIPUNCTURE: CPT

## 2022-04-04 PROCEDURE — 93005 ELECTROCARDIOGRAM TRACING: CPT

## 2022-04-04 RX ORDER — METOPROLOL TARTRATE 50 MG
3 TABLET ORAL
Qty: 0 | Refills: 0 | DISCHARGE
Start: 2022-04-04

## 2022-04-04 RX ORDER — FUROSEMIDE 40 MG
1 TABLET ORAL
Qty: 0 | Refills: 0 | DISCHARGE

## 2022-04-04 RX ORDER — POTASSIUM CHLORIDE 20 MEQ
2 PACKET (EA) ORAL
Qty: 60 | Refills: 0
Start: 2022-04-04 | End: 2022-05-03

## 2022-04-04 RX ORDER — POTASSIUM CHLORIDE 20 MEQ
1 PACKET (EA) ORAL
Qty: 60 | Refills: 0
Start: 2022-04-04 | End: 2022-05-03

## 2022-04-04 RX ORDER — POTASSIUM CHLORIDE 20 MEQ
1 PACKET (EA) ORAL
Qty: 0 | Refills: 0 | DISCHARGE

## 2022-04-04 RX ORDER — METOPROLOL TARTRATE 50 MG
1 TABLET ORAL
Qty: 0 | Refills: 0 | DISCHARGE

## 2022-04-04 RX ADMIN — ENOXAPARIN SODIUM 40 MILLIGRAM(S): 100 INJECTION SUBCUTANEOUS at 05:24

## 2022-04-04 RX ADMIN — Medication 75 MILLIGRAM(S): at 05:23

## 2022-04-04 RX ADMIN — Medication 40 MILLIEQUIVALENT(S): at 13:01

## 2022-04-04 RX ADMIN — Medication 10 MILLIGRAM(S): at 05:23

## 2022-04-04 RX ADMIN — Medication 81 MILLIGRAM(S): at 13:01

## 2022-04-04 RX ADMIN — Medication 1: at 08:30

## 2022-04-04 RX ADMIN — Medication 40 MILLIGRAM(S): at 05:23

## 2022-04-04 NOTE — PROGRESS NOTE ADULT - NS ATTEND AMEND GEN_ALL_CORE FT
Patient seems to be diuresing well, and creatinine holding.  I would continue IV Lasix while in house.  Check echo.  Need to try to ambulate patient.
Volume overloaded with acute heart failure, likely diastolic.  Continue IV Lasix.  Monitor I and O, K and creatinine.  Check echo today. To follow.  At risk for abrupt decompensation.
Patient seems to be diuresing well, and creatinine holding.  Changing to po torsemide.  Check echo.  Need to try to ambulate patient.

## 2022-04-04 NOTE — PHYSICAL THERAPY INITIAL EVALUATION ADULT - PLANNED THERAPY INTERVENTIONS, PT EVAL
1-2 Sessions: Stair Negotiation: Ascend/descend 3 steps with bilateral handrails independently./gait training

## 2022-04-04 NOTE — PHYSICAL THERAPY INITIAL EVALUATION ADULT - PATIENT PROFILE REVIEW, REHAB EVAL
Out of Bed with Assistance 4/1 19:35 - Patient ok for PT Evaluation/Out of bed to chair/Ambulation, as per EDEN Fuller./yes

## 2022-04-04 NOTE — DISCHARGE NOTE NURSING/CASE MANAGEMENT/SOCIAL WORK - PATIENT PORTAL LINK FT
You can access the FollowMyHealth Patient Portal offered by [facility] by registering at the following website: [email or web address]. By joining [facility]’s FollowMyHealth portal, you will also be able to view your health information using other applications (apps) compatible with our system.

## 2022-04-04 NOTE — PROGRESS NOTE ADULT - ASSESSMENT
72 yo F PMHx HTN, DM2,  LBBB, chronic lymphedema presents to ED with sob and cough. Consult for shortness of breath. Mild CHF 2/2 worsening of chronic lymph edema.     ADHF  - Euvolemic on exam,  No O2 requirement  - Would switch IV Lasix to Torsemide 20 mg q12 on D/C  - Patient with hx of chronic lymph edema and on compression stockings  - Pro- BNP 69563.  Please, record I/O's and daily weights.  Net neg 1.2L  - Monitor renal function and electrolytes.  Replete electrolytes to keep K>4 and Mag>2  - EKG showed sinus rhythm with 1st degree block 63bpm QT/QTc 486/497.  Non-ischemic.  No anginal complaints.    - No further NSVT on tele.  Continue Toprol XL to 75 mg daily with lower parameters.    - Continue Enalapril to 10mg daily  - No previous TTE for comparison  F/U TTE; patient has audible murmur.  If not done here, can be done as outpatient  - BP improved but labile: 110-170, though, 170 maybe an outlier.  Monitor routine hemodynamics.    - Will continue to follow.  She will need to follow up with us as outpatient    Kira Zuniga DNP, NP-C  Cardiology   Spectra #[phone]

## 2022-04-04 NOTE — CHART NOTE - NSCHARTNOTEFT_GEN_A_CORE
Called by RN as patient had 8 beats of vtach. Patient asymptomatic. Will obtain AM mag/phos. Cardiology following.
The patient was seen and examined on the day of discharge by the attending physician. Pt stable for discharge. Please see discharge note for additional information regarding the hospital course and the day of discharge.

## 2022-04-04 NOTE — PHYSICAL THERAPY INITIAL EVALUATION ADULT - GENERAL OBSERVATIONS, REHAB EVAL
Patient was received supine and left (SpO2 = 94% on room air) sitting in a chair (alarm not necessary, as per RN) with call bell in reach.

## 2022-04-04 NOTE — DISCHARGE NOTE NURSING/CASE MANAGEMENT/SOCIAL WORK - NSDCPEFALRISK_GEN_ALL_CORE
For information on Fall & Injury Prevention, visit: [email or web address] OR  [email or web address] OR  https://www.cdc.gov/steadi/patient.html

## 2022-04-04 NOTE — GOALS OF CARE CONVERSATION - ADVANCED CARE PLANNING - CONVERSATION DETAILS
Writer met with  Reviewed patient's medical and social history as well as events leading to patient's hospitalization. Writer discussed patient's current diagnosis CHF HTN  medical condition and management,  prognosis, and life expectancy. Pt states she has HCP . Recommended she express to her agent wishes regarding extent of medical care to be provided including escalation of medical care into the ICU intubation and use of vasopressor support. In addition ,her thoughts  regarding cardiopulmonary resuscitation, artificial nutrition and hydration including use of feeding tubes and IVF, antibiotics, and further investigative studies such as blood draws and radiology. pt showed good  insight into medical condition. All questions answered. Pt states she will discuss with her agent

## 2022-04-04 NOTE — PROGRESS NOTE ADULT - SUBJECTIVE AND OBJECTIVE BOX
[facility] -- Shyanne Mueller, Howard Zimmer, Carlos Valenzuela Savella, [place]  Office # [phone]    Follow Up:  NSVT    Subjective/Observations: Seen and evaluated, remains comfortable on RA.  Denies SOB or CURTIS.  Denies chest pain or palpitations.    REVIEW OF SYSTEMS: All other review of systems is negative unless indicated above  PAST MEDICAL & SURGICAL HISTORY:  Hypertension    Diabetes    Lymphedema    H/O left bundle branch block    History of left bundle branch block (LBBB)    H/O cataract  2020    History of surgical removal of meniscus of knee  left in 1971    Frozen shoulder  2000    H/O Achilles tendon repair  lengthened bilaterally, 2000    Fractured skull  1968    MEDICATIONS  (STANDING):  aspirin  chewable 81 milliGRAM(s) Oral daily  atorvastatin 40 milliGRAM(s) Oral at bedtime  dextrose 5%. 1000 milliLiter(s) (50 mL/Hr) IV Continuous <Continuous>  dextrose 5%. 1000 milliLiter(s) (100 mL/Hr) IV Continuous <Continuous>  dextrose 50% Injectable 25 Gram(s) IV Push once  dextrose 50% Injectable 12.5 Gram(s) IV Push once  dextrose 50% Injectable 25 Gram(s) IV Push once  enalapril 10 milliGRAM(s) Oral daily  enoxaparin Injectable 40 milliGRAM(s) SubCutaneous every 12 hours  furosemide   Injectable 40 milliGRAM(s) IV Push daily  glucagon  Injectable 1 milliGRAM(s) IntraMuscular once  insulin glargine Injectable (LANTUS) 20 Unit(s) SubCutaneous at bedtime  insulin lispro (ADMELOG) corrective regimen sliding scale   SubCutaneous three times a day before meals  insulin lispro (ADMELOG) corrective regimen sliding scale   SubCutaneous at bedtime  metoprolol succinate ER 75 milliGRAM(s) Oral daily  potassium chloride    Tablet ER 40 milliEquivalent(s) Oral daily    MEDICATIONS  (PRN):  dextrose Oral Gel 15 Gram(s) Oral once PRN Blood Glucose LESS THAN 70 milliGRAM(s)/deciliter  melatonin 3 milliGRAM(s) Oral at bedtime PRN Insomnia    Allergies    penicillins (Hives)    Intolerances    Vital Signs Last 24 Hrs  T(C): 36.2 (04 Apr 2022 04:54), Max: 36.6 (03 Apr 2022 16:47)  T(F): 97.2 (04 Apr 2022 04:54), Max: 97.8 (03 Apr 2022 16:47)  HR: 61 (04 Apr 2022 04:54) (54 - 61)  BP: 148/79 (04 Apr 2022 04:54) (135/66 - 172/67)  BP(mean): --  RR: 19 (04 Apr 2022 04:54) (17 - 19)  SpO2: 92% (04 Apr 2022 04:54) (91% - 92%)  I&O's Summary    03 Apr 2022 07:01  -  04 Apr 2022 07:00  --------------------------------------------------------  IN: 360 mL / OUT: 1600 mL / NET: -1240 mL      PHYSICAL EXAM:  TELE: Not on tele  Constitutional: NAD, awake and alert, morbidly obese  HEENT: Moist Mucous Membranes, Anicteric  Pulmonary: Non-labored, breath sounds are diminished bilaterally, No wheezing, rales or rhonchi  Cardiovascular: Regular, S1 and S2, + murmurs, no rubs, gallops or clicks  Gastrointestinal: Bowel Sounds present, soft, nontender.   Lymph: +2 BLE edema. +BLE lymphedema R>L  Skin: No visible rashes. +chronic skin changes BLE.  Psych:  Mood & affect appropriate    LABS: All Labs Reviewed:                        12.5   6.59  )-----------( 233      ( 04 Apr 2022 07:48 )             39.1                         11.8   5.78  )-----------( 185      ( 03 Apr 2022 08:02 )             37.1                         12.0   6.19  )-----------( 172      ( 02 Apr 2022 11:08 )             37.0     04 Apr 2022 07:48    141    |  103    |  15     ----------------------------<  170    4.0     |  32     |  1.10   03 Apr 2022 08:02    141    |  104    |  15     ----------------------------<  136    3.9     |  30     |  0.98   02 Apr 2022 11:08    142    |  104    |  13     ----------------------------<  81     3.6     |  32     |  1.00     Ca    8.4        04 Apr 2022 07:48  Ca    8.3        03 Apr 2022 08:02  Ca    8.8        02 Apr 2022 11:08  Phos  3.5       04 Apr 2022 07:48  Phos  3.5       03 Apr 2022 08:02  Phos  3.4       02 Apr 2022 11:08  Mg     2.0       04 Apr 2022 07:48  Mg     1.8       03 Apr 2022 08:02  Mg     1.9       02 Apr 2022 11:08    TPro  7.2    /  Alb  3.1    /  TBili  0.8    /  DBili  x      /  AST  22     /  ALT  20     /  AlkPhos  72     04 Apr 2022 07:48  TPro  6.9    /  Alb  2.9    /  TBili  0.6    /  DBili  x      /  AST  25     /  ALT  19     /  AlkPhos  70     03 Apr 2022 08:02  TPro  7.2    /  Alb  3.2    /  TBili  0.8    /  DBili  x      /  AST  25     /  ALT  23     /  AlkPhos  73     02 Apr 2022 11:08    ACC: [id number] EXAM:  XR CHEST PA LAT 2V                          PROCEDURE DATE:  04/01/2022      INTERPRETATION:  Chest pain short of breath.    PA lateral. Prior 7/2/2021.    Low lung volumes. Cardiomegaly with left ventricular configuration   similar to prior. Nonspecific chronic accentuation bibasilar   bronchovascular markings similar to prior. No focal consolidation.   Possible trace left pleural effusion.    IMPRESSION: Mild cardiomegaly. No active infiltrates. Possible trace left   pleural effusion.    --- End of Report ---    MARIAN KLINE MD; Attending Radiologist  This document has been electronically signed. Apr 1 2022  4:41PM    Ventricular Rate 63 BPM    Atrial Rate 63 BPM    P-R Interval 256 ms    QRS Duration 160 ms    Q-T Interval 486 ms    QTC Calculation(Bazett) 497 ms    P Axis 67 degrees    R Axis -40 degrees    T Axis 147 degrees    Diagnosis Line Sinus rhythm with 1st degree AV block  Left axis deviation  Left bundle branch block  Abnormal ECG  Confirmed by JAKE VALENZUELA ([id number]) on 4/2/2022 11:19:23 AM

## 2022-04-05 ENCOUNTER — NON-APPOINTMENT (OUTPATIENT)
Age: 72
End: 2022-04-05

## 2022-04-05 PROBLEM — Z86.79 PERSONAL HISTORY OF OTHER DISEASES OF THE CIRCULATORY SYSTEM: Chronic | Status: ACTIVE | Noted: 2022-04-01

## 2022-04-05 RX ORDER — METOPROLOL TARTRATE 50 MG
1 TABLET ORAL
Qty: 30 | Refills: 0
Start: 2022-04-05 | End: 2022-05-04

## 2022-04-12 ENCOUNTER — APPOINTMENT (OUTPATIENT)
Dept: INTERNAL MEDICINE | Facility: CLINIC | Age: 72
End: 2022-04-12
Payer: MEDICARE

## 2022-04-12 VITALS
DIASTOLIC BLOOD PRESSURE: 70 MMHG | RESPIRATION RATE: 21 BRPM | HEART RATE: 65 BPM | WEIGHT: 293 LBS | TEMPERATURE: 98.1 F | HEIGHT: 71 IN | BODY MASS INDEX: 41.02 KG/M2 | SYSTOLIC BLOOD PRESSURE: 150 MMHG | OXYGEN SATURATION: 99 %

## 2022-04-12 DIAGNOSIS — L97.416 NON-PRESSURE CHRONIC ULCER OF RIGHT HEEL AND MIDFOOT WITH BONE INVOLVEMENT WITHOUT EVIDENCE OF NECROSIS: ICD-10-CM

## 2022-04-12 DIAGNOSIS — M86.671 OTHER CHRONIC OSTEOMYELITIS, RIGHT ANKLE AND FOOT: ICD-10-CM

## 2022-04-12 DIAGNOSIS — L97.509 TYPE 2 DIABETES MELLITUS WITH FOOT ULCER: ICD-10-CM

## 2022-04-12 DIAGNOSIS — M86.60 OTHER CHRONIC OSTEOMYELITIS, UNSPECIFIED SITE: ICD-10-CM

## 2022-04-12 DIAGNOSIS — Z78.9 OTHER SPECIFIED HEALTH STATUS: ICD-10-CM

## 2022-04-12 DIAGNOSIS — I89.0 LYMPHEDEMA, NOT ELSEWHERE CLASSIFIED: ICD-10-CM

## 2022-04-12 DIAGNOSIS — E11.621 TYPE 2 DIABETES MELLITUS WITH FOOT ULCER: ICD-10-CM

## 2022-04-12 DIAGNOSIS — Z13.21 ENCOUNTER FOR SCREENING FOR NUTRITIONAL DISORDER: ICD-10-CM

## 2022-04-12 DIAGNOSIS — Z80.3 FAMILY HISTORY OF MALIGNANT NEOPLASM OF BREAST: ICD-10-CM

## 2022-04-12 DIAGNOSIS — Z82.3 FAMILY HISTORY OF STROKE: ICD-10-CM

## 2022-04-12 DIAGNOSIS — L40.9 PSORIASIS, UNSPECIFIED: ICD-10-CM

## 2022-04-12 PROCEDURE — G0442 ANNUAL ALCOHOL SCREEN 15 MIN: CPT | Mod: 59

## 2022-04-12 PROCEDURE — 99496 TRANSJ CARE MGMT HIGH F2F 7D: CPT | Mod: 25

## 2022-04-12 RX ORDER — FUROSEMIDE 40 MG/1
40 TABLET ORAL
Refills: 0 | Status: COMPLETED | COMMUNITY
End: 2022-04-12

## 2022-04-12 RX ORDER — CEFTRIAXONE 2 G/1
2 INJECTION, POWDER, FOR SOLUTION INTRAMUSCULAR; INTRAVENOUS
Refills: 0 | Status: DISCONTINUED | COMMUNITY
End: 2022-04-12

## 2022-04-12 RX ORDER — CEFAZOLIN SODIUM 1 G/50ML
1-4 INJECTION, SOLUTION INTRAVENOUS EVERY 8 HOURS
Qty: 126 | Refills: 0 | Status: DISCONTINUED | COMMUNITY
Start: 2021-06-18 | End: 2022-04-12

## 2022-04-12 NOTE — PLAN
[FreeTextEntry1] : Cardiology Dr. Noa Forrest-     nuclear cardiology \par Dr. Luong  [phone] HAs an appointment in the am \par Endocrinology Dr Vail May 10, 2022\par Podiatry  06/03/22 \par optho - Dr. Garcia 5/03/22  \par \par Advised to RTO in 4-6 weeks for a blood pressure check\par Rxn given to get fasting labs \par request consult reports from endo \par Rxn given for dietician \par Monitor weight daily and blood glucose BID   \par Continue with weight loss \par Continue all medications \par Advised would like to see an LDL less thn 70  and better blood pressure control\par Advised to wear  wraps on lower ext for lymphedema \par \par will update immunization records \par I spent 46  Minutes with the patient, half of which we discussed finding on physical exam  and coordinated care.  As well as reviewed my plans and follow ups.

## 2022-04-12 NOTE — HISTORY OF PRESENT ILLNESS
[Post-hospitalization from ___ Hospital] : Post-hospitalization from [unfilled] Hospital [Admitted on: ___] : The patient was admitted on [unfilled] [Discharged on ___] : discharged on [unfilled] [Discharge Summary] : discharge summary [Discharge Med List] : discharge medication list [Med Reconciliation] : medication reconciliation has been completed [Patient Contacted By: ____] : and contacted by [unfilled] [FreeTextEntry3] : New Patient  [FreeTextEntry2] : Went to Ed on 04/01/22 for extreme SOB - Was admitted and diagnosed with CHF.  -  Denies any complication during her hospital visit.

## 2022-04-12 NOTE — HEALTH RISK ASSESSMENT
[Intercurrent hospitalizations] : was admitted to the hospital  [Never] : Never [Yes] : Yes [Monthly or less (1 pt)] : Monthly or less (1 point) [1 or 2 (0 pts)] : 1 or 2 (0 points) [Never (0 pts)] : Never (0 points) [No] : In the past 12 months have you used drugs other than those required for medical reasons? No [No falls in past year] : Patient reported no falls in the past year [0] : 2) Feeling down, depressed, or hopeless: Not at all (0) [PHQ-2 Negative - No further assessment needed] : PHQ-2 Negative - No further assessment needed [None] : None [With Significant Other] : lives with significant other [# of Members in Household ___] :  household currently consist of [unfilled] member(s) [Retired] : retired [College] : College [Single] : single [Significant Other] : lives with significant other [# Of Children ___] : has [unfilled] children [Sexually Active] : sexually active [Feels Safe at Home] : Feels safe at home [Fully functional (bathing, dressing, toileting, transferring, walking, feeding)] : Fully functional (bathing, dressing, toileting, transferring, walking, feeding) [Fully functional (using the telephone, shopping, preparing meals, housekeeping, doing laundry, using] : Fully functional and needs no help or supervision to perform IADLs (using the telephone, shopping, preparing meals, housekeeping, doing laundry, using transportation, managing medications and managing finances) [Smoke Detector] : smoke detector [Carbon Monoxide Detector] : carbon monoxide detector [Seat Belt] :  uses seat belt [de-identified] : [facility]  [de-identified] : Cardio, Endo (Dr VAN),  Podiatrist (Dr Mohan), Ophthalmologist (Dr. Feliz Garcia)  [de-identified] : very rare  [Audit-CScore] : 1 [de-identified] : none  [de-identified] : ADA, Low na diet  [OPS0Cjxkr] : 0 [Reports changes in vision] : Reports no changes in vision [MammogramComments] : 10 years ago normal  [PapSmearComments] : last PAP about 15 years ago wnl  [HepatitisCDate] : 01/21 [HepatitisCComments] : negative (HIE records)

## 2022-04-12 NOTE — ADDENDUM
[FreeTextEntry1] : reviewed weight checks  4/6 363.2\par 4/7 357.8\par 4/8 356.2\par 4/9 357.4\par 4/10 353.8\par 2/11 347.4 \par \par Reviewed labs from the hospital CBC, CMP,(low calcium), blood type

## 2022-04-12 NOTE — REVIEW OF SYSTEMS
[Lower Ext Edema] : lower extremity edema [Nocturia] : nocturia [Skin Rash] : skin rash [Negative] : Constitutional [Fever] : no fever [Chills] : no chills [Fatigue] : no fatigue [Night Sweats] : no night sweats [Recent Change In Weight] : ~T no recent weight change [Discharge] : no discharge [Pain] : no pain [Vision Problems] : no vision problems [Earache] : no earache [Nosebleed] : no nosebleeds [Sore Throat] : no sore throat [Postnasal Drip] : no postnasal drip [Chest Pain] : no chest pain [Palpitations] : no palpitations [Leg Claudication] : no leg claudication [Shortness Of Breath] : no shortness of breath [Wheezing] : no wheezing [Cough] : no cough [Abdominal Pain] : no abdominal pain [Constipation] : no constipation [Diarrhea] : diarrhea [Vomiting] : no vomiting [Heartburn] : no heartburn [Melena] : no melena [Dysuria] : no dysuria [Incontinence] : no incontinence [Hematuria] : no hematuria [Joint Pain] : no joint pain [Muscle Pain] : no muscle pain [Itching] : no itching [Mole Changes] : no mole changes [Nail Changes] : no nail changes [Insomnia] : no insomnia [Anxiety] : no anxiety [Depression] : no depression [Easy Bleeding] : no easy bleeding [Easy Bruising] : no easy bruising [Swollen Glands] : no swollen glands [FreeTextEntry5] : improved

## 2022-04-12 NOTE — ASSESSMENT
[FreeTextEntry1] : Ms. LOPEZ is a 71 year  female, who present to the office for new patient for a hospital follow up. \par

## 2022-04-12 NOTE — PHYSICAL EXAM
[No Acute Distress] : no acute distress [Well Nourished] : well nourished [Well Developed] : well developed [Well-Appearing] : well-appearing [Normal Sclera/Conjunctiva] : normal sclera/conjunctiva [PERRL] : pupils equal round and reactive to light [EOMI] : extraocular movements intact [Normal Outer Ear/Nose] : the outer ears and nose were normal in appearance [Normal Oropharynx] : the oropharynx was normal [No JVD] : no jugular venous distention [No Lymphadenopathy] : no lymphadenopathy [Supple] : supple [Thyroid Normal, No Nodules] : the thyroid was normal and there were no nodules present [No Respiratory Distress] : no respiratory distress  [No Accessory Muscle Use] : no accessory muscle use [Clear to Auscultation] : lungs were clear to auscultation bilaterally [Normal Rate] : normal rate  [Regular Rhythm] : with a regular rhythm [Normal S1, S2] : normal S1 and S2 [No Carotid Bruits] : no carotid bruits [No Abdominal Bruit] : a ~M bruit was not heard ~T in the abdomen [Pedal Pulses Present] : the pedal pulses are present [No Palpable Aorta] : no palpable aorta [No Extremity Clubbing/Cyanosis] : no extremity clubbing/cyanosis [Soft] : abdomen soft [Non Tender] : non-tender [Non-distended] : non-distended [No Masses] : no abdominal mass palpated [No HSM] : no HSM [Normal Bowel Sounds] : normal bowel sounds [Normal Posterior Cervical Nodes] : no posterior cervical lymphadenopathy [Normal Anterior Cervical Nodes] : no anterior cervical lymphadenopathy [No CVA Tenderness] : no CVA  tenderness [No Spinal Tenderness] : no spinal tenderness [No Joint Swelling] : no joint swelling [Grossly Normal Strength/Tone] : grossly normal strength/tone [Coordination Grossly Intact] : coordination grossly intact [No Focal Deficits] : no focal deficits [Normal Gait] : normal gait [Deep Tendon Reflexes (DTR)] : deep tendon reflexes were 2+ and symmetric [Normal Affect] : the affect was normal [Normal Insight/Judgement] : insight and judgment were intact [03340 - High Complexity requires an extensive number of possible diagnoses and/or the management options, extensive complexity of the medical data (tests, etc.) to be reviewed, and a high risk of significant complications, morbidity, and/or mortality as w] : High Complexity  [Normal TMs] : both tympanic membranes were normal [Obese] : obese [Speech Grossly Normal] : speech grossly normal [Alert and Oriented x3] : oriented to person, place, and time [Normal Mood] : the mood was normal [de-identified] : with murmur  [de-identified] : edema noted chronic lymphedema  - chronic vasc changes lower ext  [de-identified] : small bruises lower abd  [de-identified] : as per gyn  [de-identified] : patches of psoriasis  trunk arms and legs  [de-identified] : walks with a cane

## 2022-04-13 ENCOUNTER — APPOINTMENT (OUTPATIENT)
Dept: CARDIOLOGY | Facility: CLINIC | Age: 72
End: 2022-04-13
Payer: MEDICARE

## 2022-04-13 ENCOUNTER — NON-APPOINTMENT (OUTPATIENT)
Age: 72
End: 2022-04-13

## 2022-04-13 VITALS
SYSTOLIC BLOOD PRESSURE: 120 MMHG | OXYGEN SATURATION: 87 % | WEIGHT: 293 LBS | DIASTOLIC BLOOD PRESSURE: 68 MMHG | HEART RATE: 68 BPM | HEIGHT: 71 IN | BODY MASS INDEX: 41.02 KG/M2

## 2022-04-13 PROCEDURE — 93000 ELECTROCARDIOGRAM COMPLETE: CPT

## 2022-04-13 PROCEDURE — 99215 OFFICE O/P EST HI 40 MIN: CPT

## 2022-04-17 ENCOUNTER — INPATIENT (INPATIENT)
Facility: HOSPITAL | Age: 72
LOS: 2 days | Discharge: HOME | DRG: 377 | End: 2022-04-20
Attending: FAMILY MEDICINE | Admitting: STUDENT IN AN ORGANIZED HEALTH CARE EDUCATION/TRAINING PROGRAM
Payer: MEDICARE

## 2022-04-17 VITALS
SYSTOLIC BLOOD PRESSURE: 153 MMHG | OXYGEN SATURATION: 96 % | DIASTOLIC BLOOD PRESSURE: 84 MMHG | RESPIRATION RATE: 18 BRPM | WEIGHT: 293 LBS | TEMPERATURE: 98 F | HEIGHT: 71 IN | HEART RATE: 77 BPM

## 2022-04-17 DIAGNOSIS — M75.00 ADHESIVE CAPSULITIS OF UNSPECIFIED SHOULDER: Chronic | ICD-10-CM

## 2022-04-17 DIAGNOSIS — S02.91XA UNSPECIFIED FRACTURE OF SKULL, INITIAL ENCOUNTER FOR CLOSED FRACTURE: Chronic | ICD-10-CM

## 2022-04-17 DIAGNOSIS — Z86.69 PERSONAL HISTORY OF OTHER DISEASES OF THE NERVOUS SYSTEM AND SENSE ORGANS: Chronic | ICD-10-CM

## 2022-04-17 DIAGNOSIS — Z98.890 OTHER SPECIFIED POSTPROCEDURAL STATES: Chronic | ICD-10-CM

## 2022-04-17 LAB
ALBUMIN SERPL ELPH-MCNC: 3.4 G/DL — SIGNIFICANT CHANGE UP (ref 3.3–5)
ALP SERPL-CCNC: 76 U/L — SIGNIFICANT CHANGE UP (ref 40–120)
ALT FLD-CCNC: 27 U/L — SIGNIFICANT CHANGE UP (ref 12–78)
ANION GAP SERPL CALC-SCNC: 6 MMOL/L — SIGNIFICANT CHANGE UP (ref 5–17)
APTT BLD: 32 SEC — SIGNIFICANT CHANGE UP (ref 27.5–35.5)
AST SERPL-CCNC: 30 U/L — SIGNIFICANT CHANGE UP (ref 15–37)
BASOPHILS # BLD AUTO: 0.06 K/UL — SIGNIFICANT CHANGE UP (ref 0–0.2)
BASOPHILS NFR BLD AUTO: 0.5 % — SIGNIFICANT CHANGE UP (ref 0–2)
BILIRUB SERPL-MCNC: 0.9 MG/DL — SIGNIFICANT CHANGE UP (ref 0.2–1.2)
BUN SERPL-MCNC: 25 MG/DL — HIGH (ref 7–23)
CALCIUM SERPL-MCNC: 9 MG/DL — SIGNIFICANT CHANGE UP (ref 8.5–10.1)
CHLORIDE SERPL-SCNC: 96 MMOL/L — SIGNIFICANT CHANGE UP (ref 96–108)
CO2 SERPL-SCNC: 34 MMOL/L — HIGH (ref 22–31)
CREAT SERPL-MCNC: 1.2 MG/DL — SIGNIFICANT CHANGE UP (ref 0.5–1.3)
EGFR: 49 ML/MIN/1.73M2 — LOW
EOSINOPHIL # BLD AUTO: 0.18 K/UL — SIGNIFICANT CHANGE UP (ref 0–0.5)
EOSINOPHIL NFR BLD AUTO: 1.5 % — SIGNIFICANT CHANGE UP (ref 0–6)
GLUCOSE SERPL-MCNC: 191 MG/DL — HIGH (ref 70–99)
HCT VFR BLD CALC: 42.2 % — SIGNIFICANT CHANGE UP (ref 34.5–45)
HGB BLD-MCNC: 13.5 G/DL — SIGNIFICANT CHANGE UP (ref 11.5–15.5)
IMM GRANULOCYTES NFR BLD AUTO: 0.8 % — SIGNIFICANT CHANGE UP (ref 0–1.5)
INR BLD: 1.18 RATIO — HIGH (ref 0.88–1.16)
LIDOCAIN IGE QN: 79 U/L — SIGNIFICANT CHANGE UP (ref 73–393)
LYMPHOCYTES # BLD AUTO: 0.65 K/UL — LOW (ref 1–3.3)
LYMPHOCYTES # BLD AUTO: 5.5 % — LOW (ref 13–44)
MCHC RBC-ENTMCNC: 27.7 PG — SIGNIFICANT CHANGE UP (ref 27–34)
MCHC RBC-ENTMCNC: 32 GM/DL — SIGNIFICANT CHANGE UP (ref 32–36)
MCV RBC AUTO: 86.7 FL — SIGNIFICANT CHANGE UP (ref 80–100)
MONOCYTES # BLD AUTO: 1.04 K/UL — HIGH (ref 0–0.9)
MONOCYTES NFR BLD AUTO: 8.9 % — SIGNIFICANT CHANGE UP (ref 2–14)
NEUTROPHILS # BLD AUTO: 9.7 K/UL — HIGH (ref 1.8–7.4)
NEUTROPHILS NFR BLD AUTO: 82.8 % — HIGH (ref 43–77)
NRBC # BLD: 0 /100 WBCS — SIGNIFICANT CHANGE UP (ref 0–0)
PLATELET # BLD AUTO: 240 K/UL — SIGNIFICANT CHANGE UP (ref 150–400)
POTASSIUM SERPL-MCNC: 3.9 MMOL/L — SIGNIFICANT CHANGE UP (ref 3.5–5.3)
POTASSIUM SERPL-SCNC: 3.9 MMOL/L — SIGNIFICANT CHANGE UP (ref 3.5–5.3)
PROT SERPL-MCNC: 8.2 G/DL — SIGNIFICANT CHANGE UP (ref 6–8.3)
PROTHROM AB SERPL-ACNC: 13.8 SEC — HIGH (ref 10.5–13.4)
RAPID RVP RESULT: SIGNIFICANT CHANGE UP
RBC # BLD: 4.87 M/UL — SIGNIFICANT CHANGE UP (ref 3.8–5.2)
RBC # FLD: 15.1 % — HIGH (ref 10.3–14.5)
SARS-COV-2 RNA SPEC QL NAA+PROBE: SIGNIFICANT CHANGE UP
SODIUM SERPL-SCNC: 136 MMOL/L — SIGNIFICANT CHANGE UP (ref 135–145)
WBC # BLD: 11.72 K/UL — HIGH (ref 3.8–10.5)
WBC # FLD AUTO: 11.72 K/UL — HIGH (ref 3.8–10.5)

## 2022-04-17 PROCEDURE — 74176 CT ABD & PELVIS W/O CONTRAST: CPT | Mod: 26,MA

## 2022-04-17 PROCEDURE — 99223 1ST HOSP IP/OBS HIGH 75: CPT | Mod: GC

## 2022-04-17 PROCEDURE — 99285 EMERGENCY DEPT VISIT HI MDM: CPT | Mod: FS,CS

## 2022-04-17 PROCEDURE — 71045 X-RAY EXAM CHEST 1 VIEW: CPT | Mod: 26

## 2022-04-17 RX ORDER — FUROSEMIDE 40 MG
60 TABLET ORAL ONCE
Refills: 0 | Status: COMPLETED | OUTPATIENT
Start: 2022-04-17 | End: 2022-04-17

## 2022-04-17 RX ORDER — PANTOPRAZOLE SODIUM 20 MG/1
40 TABLET, DELAYED RELEASE ORAL ONCE
Refills: 0 | Status: DISCONTINUED | OUTPATIENT
Start: 2022-04-17 | End: 2022-04-17

## 2022-04-17 RX ORDER — PANTOPRAZOLE SODIUM 20 MG/1
40 TABLET, DELAYED RELEASE ORAL ONCE
Refills: 0 | Status: COMPLETED | OUTPATIENT
Start: 2022-04-17 | End: 2022-04-17

## 2022-04-17 RX ORDER — ATORVASTATIN CALCIUM 80 MG/1
1 TABLET, FILM COATED ORAL
Qty: 0 | Refills: 0 | DISCHARGE

## 2022-04-17 RX ORDER — ONDANSETRON 8 MG/1
4 TABLET, FILM COATED ORAL ONCE
Refills: 0 | Status: COMPLETED | OUTPATIENT
Start: 2022-04-17 | End: 2022-04-17

## 2022-04-17 RX ADMIN — PANTOPRAZOLE SODIUM 40 MILLIGRAM(S): 20 TABLET, DELAYED RELEASE ORAL at 21:25

## 2022-04-17 RX ADMIN — ONDANSETRON 4 MILLIGRAM(S): 8 TABLET, FILM COATED ORAL at 21:01

## 2022-04-17 NOTE — ED CLERICAL - NS ED CLERK NOTE PRE-ARRIVAL INFORMATION; ADDITIONAL PRE-ARRIVAL INFORMATION
This patient is enrolled in a readmission reduction initiative and has active care navigation. This patient can be followed up by the care navigation team within 24 hours.  To arrange close follow-up or to obtain additional clinical information, please call the care navigation contact number above.      For patients previously on the Hospitalist Service please call the hospitalist at [phone] for input and/or consultation PRIOR to admission. If the patient was on a Voluntary Physician’s service please contact that physician for input and/or consultation PRIOR to admission.

## 2022-04-17 NOTE — ED PROVIDER NOTE - PHYSICAL EXAMINATION
Constitutional: Awake, Alert, non-toxic. NAD. Well appearing, well nourished.   HEAD: Normocephalic, atraumatic.   EYES: EOM intact, conjunctiva and sclera are clear bilaterally.   ENT: No rhinorrhea, patent, mucous membranes pink/moist, no drooling or stridor.   NECK: Supple, non-tender  CARDIOVASCULAR: Normal S1, S2; regular rate and rhythm.  RESPIRATORY: Normal respiratory effort; breath sounds CTAB, no wheezes, rhonchi, or rales. Speaking in full sentences. No accessory muscle use.   ABDOMEN: Soft; non-tender, non-distended   EXTREMITIES: Full passive and active ROM in all extremities; non-tender to palpation; distal pulses palpable and symmetric  SKIN: Warm, dry; good skin turgor, no apparent lesions or rashes, no ecchymosis, brisk capillary refill.  NEURO: A&O x3. Sensory and motor functions are grossly intact. Speech is normal. Appearance and judgement seem appropriate for gender and age.

## 2022-04-17 NOTE — ED ADULT TRIAGE NOTE - INTERNATIONAL TRAVEL
Called patient's mother and discussed positive findings of RSV  Continue conservative management  Mother states the patient is doing better today  Recommended she follow up with pediatrician or return to ER if symptoms worsen  Mother expressed her understanding and agreement with the plan 
No

## 2022-04-17 NOTE — ED ADULT NURSE NOTE - OBJECTIVE STATEMENT
Patient complaining of abdominal discomfort.  Patient is awake, alert and oriented. Vital signs are stable.  No distress noted. Patient able to speak coherently in complete sentences without any shortness of breath.

## 2022-04-17 NOTE — H&P ADULT - HISTORY OF PRESENT ILLNESS
72 yo F PMHx HTN, DM2,  LBBB, chronic lymphedema, HFreF (EF 40-45%)  presents to ED with vomiting.     Patient was recently admitted to [facility] 4/1-4/4/22 with CHF exacerbation, treated with IV diuretics and discharged to home on torsemide.    In the ED  VS:T 98, Hr 69, /84-->118/56, RR 20, SPO2 96-> 86% on RA  Labs: WBC 11.72, HH 13.5/42.2, PLTS 240, Na 136, K 3.9, Cr 1.20, Gluc 191, RVP negative, lipase negative  Chest Xray: trace left pleural effusion ( personal read)  CT A/P: pending  EKG:  Given in ED:  zofran 4mg IVP x1, protonix 40mg IVP x1         72 yo F PMHx HTN, DM2,  LBBB, chronic lymphedema, HFreF (EF 40-45%)  presents to ED with vomiting. Patient was in normal state of health until today. She went to family easter dinner, was unable to eat due to nausea and suddenly started vomiting, non bloody, coffee colored emesis. States she vomited >10 times. She had cereal for breakfast and tuna sandwhich for lunch, no dinner. Denies any abdominal pain, lightheadedness fever, chest pain, dyspnea, leg pain. Admits to chills. She denies any chronic nsaid or steroid use. Denies etoh use. No recent travels.     Of note, Patient was recently admitted to [facility] 4/1-4/4/22 with CHF exacerbation, treated with IV diuretics and discharged to home on torsemide.    In the ED  VS:T 98, Hr 69, /84-->118/56, RR 20, SPO2 96-> 86% on RA  Labs: WBC 11.72, HH 13.5/42.2, PLTS 240, Na 136, K 3.9, Cr 1.20, Gluc 191, RVP negative, lipase negative  Chest Xray: trace left pleural effusion ( personal read)  CT A/P: pending  EKG:  Given in ED:  zofran 4mg IVP x1, protonix 40mg IVP x1         72 yo F PMHx HTN, DM2,  LBBB, chronic lymphedema, HFreF (EF 40-45%)  presents to ED with vomiting. Patient was in normal state of health until today. She went to [employer] easter dinner, was unable to eat due to nausea and suddenly started vomiting, non bloody, coffee colored emesis. States she vomited >10 times. She had cereal for breakfast and tuna sandwhich for lunch, no dinner. Denies any abdominal pain, lightheadedness fever, chest pain, dyspnea, leg pain. Admits to chills. She denies any chronic nsaid or steroid use. Denies etoh use. Abdominal surgical history significant for oophorectomy many years ago. No recent travels.     Of note, Patient was recently admitted to [facility] 4/1-4/4/22 with CHF exacerbation, treated with IV diuretics and discharged to home on torsemide.    In the ED  VS:T 98, Hr 69, /84-->118/56, RR 20, SPO2 96-> 86% on RA  Labs: WBC 11.72, HH 13.5/42.2, PLTS 240, Na 136, K 3.9, Cr 1.20, Gluc 191, RVP negative, lipase negative  Chest Xray: trace left pleural effusion ( personal read)  CT A/P: no acute pathology  EKG: pending  Given in ED:  zofran 4mg IVP x1, protonix 40mg IVP x1         72 yo F PMHx HTN, DM2, LBBB, chronic lymphedema, HFrEF (EF 35-40%)  presents to ED with vomiting. Patient was in normal state of health until today. She went to family easter dinner, was unable to eat due to nausea and suddenly started vomiting, non bloody, coffee colored emesis (states that it was not grainy or like coffee grounds). States she vomited >10 times. She had cereal for breakfast and tuna sandwich for lunch, no dinner. Denies any abdominal pain, lightheadedness fever, chest pain, dyspnea, leg pain. Admits to chills. She denies any chronic nsaid or steroid use. Denies etoh use. Abdominal surgical history significant for oophorectomy many years ago. No recent travels. She desaturated in the ED to 86% on RA (while sleeping). She was ordered for 500cc bolus but did not receive most of it due to history of CHF. Her O2 improved with oxygen supplementation and she was weaned off to room air.    Of note, Patient was recently admitted to [facility] 4/1-4/4/22 with CHF exacerbation, treated with IV diuretics and discharged to home on torsemide.    In the ED  VS:T 98, Hr 69, /84-->118/56, RR 20, SPO2 96-> 86% on RA  Labs: WBC 11.72, HH 13.5/42.2, PLTS 240, Na 136, K 3.9, Cr 1.20, Gluc 191, RVP negative, lipase negative  Chest Xray: trace left pleural effusion ( personal read)  CT A/P: no acute pathology  EKG: pending  Given in ED:  zofran 4mg IVP x1, protonix 40mg IVP x1

## 2022-04-17 NOTE — ED ADULT NURSE NOTE - NSIMPLEMENTINTERV_GEN_ALL_ED
Implemented All Universal Safety Interventions:  [place] to call system. Call bell, personal items and telephone within reach. Instruct patient to call for assistance. Room bathroom lighting operational. Non-slip footwear when patient is off stretcher. Physically safe environment: no spills, clutter or unnecessary equipment. Stretcher in lowest position, wheels locked, appropriate side rails in place. Implemented All Fall Risk Interventions:  [place] to call system. Call bell, personal items and telephone within reach. Instruct patient to call for assistance. Room bathroom lighting operational. Non-slip footwear when patient is off stretcher. Physically safe environment: no spills, clutter or unnecessary equipment. Stretcher in lowest position, wheels locked, appropriate side rails in place. Provide visual cue, wrist band, yellow gown, etc. Monitor gait and stability. Monitor for mental status changes and reorient to person, place, and time. Review medications for side effects contributing to fall risk. Reinforce activity limits and safety measures with patient and family.

## 2022-04-17 NOTE — ED ADULT TRIAGE NOTE - CHIEF COMPLAINT QUOTE
Patient A/Ox4 c/o ABD pain and vomiting that began today. Denies any sick contacts or food triggers. Denies fever, diarrhea or constipation.

## 2022-04-17 NOTE — ED PROVIDER NOTE - OBJECTIVE STATEMENT
72 y/o female with PMHx HTN and CHF presents today due to vomiting. pt reports acute onset of vomit around 5pm. pt reports coffee colored vomit. pt admits to ASA use. pt reports recent admission for CHF. pt denies fever, abd pain, diarrhea, chest pain, SOB, or any other complaints.

## 2022-04-17 NOTE — ED PROVIDER NOTE - CLINICAL SUMMARY MEDICAL DECISION MAKING FREE TEXT BOX
Pt is a 70 yo female hx chf, recently hospitalized on asa. pt today at 5pm began with coffee ground emesis. no abd pain, no blood, no black stool, no dizziness or sob, pt on exam obese, nauseated, conj pink , anicteric, abd soft, nt, ext: 3+ edema b/l ( chronic).  check labs,  protonix, zofran, gentle ivf, d/w gi, check ct a/p ro obst, then admit for serial  h/h and gi in am if ct neg, Pt is a 72 yo female hx chf, recently hospitalized on asa. pt today at 5pm began with coffee ground emesis. no abd pain, no blood, no black stool, no dizziness or sob, pt on exam obese, nauseated, conj pink , anicteric, abd soft, nt, ext: 3+ edema b/l ( chronic).  check labs,  protonix, zofran, gentle ivf, d/w gi, check ct a/p ro obst, then admit for serial  h/h and gi in am if ct neg,   pt with hypoxia with sob in ed, cxr c/w chf, lasix, o2 admit tele

## 2022-04-17 NOTE — H&P ADULT - NSHPPHYSICALEXAM_GEN_ALL_CORE
T(C): 36.7 (04-17-22 @ 21:57), Max: 36.7 (04-17-22 @ 21:57)  HR: 69 (04-17-22 @ 21:57) (69 - 77)  BP: 118/56 (04-17-22 @ 21:57) (118/56 - 153/84)  RR: 20 (04-17-22 @ 21:57) (18 - 20)  SpO2: 86% (04-17-22 @ 21:57) (86% - 96%)    GENERAL: patient appears well, no acute distress, appropriately interactive  EYES: sclera clear, no exudates  ENMT: oropharynx clear without erythema, no exudates, moist mucous membranes  LUNGS: good air entry bilaterally, clear to auscultation, symmetric breath sounds, no wheezing or rhonchi appreciated  HEART: soft S1/S2, regular rate and rhythm, no murmurs noted, no lower extremity edema  GASTROINTESTINAL: abdomen is soft, nontender, nondistended, normoactive bowel sounds  INTEGUMENT: good skin turgor, warm skin, appears well perfused  MUSCULOSKELETAL: no clubbing or cyanosis, no obvious deformity  NEUROLOGIC: awake, alert, oriented x3, good muscle tone in all 4 extremities  HEME/LYMPH: no obvious ecchymosis or petechiae T(C): 36.7 (04-17-22 @ 21:57), Max: 36.7 (04-17-22 @ 21:57)  HR: 69 (04-17-22 @ 21:57) (69 - 77)  BP: 118/56 (04-17-22 @ 21:57) (118/56 - 153/84)  RR: 20 (04-17-22 @ 21:57) (18 - 20)  SpO2: 86% (04-17-22 @ 21:57) (86% - 96%)    GENERAL: obese female, appears well, no acute distress, appropriately interactive  EYES: sclera clear, no exudates  ENMT: oropharynx clear without erythema, no exudates, moist mucous membranes  LUNGS: distant but clear breath sounds  HEART: soft S1/S2, regular rate and rhythm, no murmurs noted; b/l LE edema  GASTROINTESTINAL: abdomen is soft, nontender, nondistended, normoactive bowel sounds  INTEGUMENT: good skin turgor, warm skin, appears well perfused  MUSCULOSKELETAL: no clubbing or cyanosis, no obvious deformity  NEUROLOGIC: awake, alert, oriented x3, good muscle tone in all 4 extremities  HEME/LYMPH: no obvious ecchymosis or petechiae

## 2022-04-17 NOTE — ED ADULT NURSE REASSESSMENT NOTE - NS ED NURSE REASSESS COMMENT FT1
Patient found sleeping in bed.  Low O2% noted at 86. Wave form good.  Retested with portable machine with same results.  Patient has no complaints at this cayetano Patient found sleeping in bed.  Low O2% noted at 86. Wave form good.  Retested with portable machine with same results.  Patient has no complaints at this time.  Dr. Prater noticifed.  Patient placed on 2l NC.    8547 Patient to CT scan.

## 2022-04-17 NOTE — ED ADULT NURSE NOTE - TEMPLATE
[Mother] : mother [Calm] : calm [Happy] : happy [Stays Home With Siblings] : stays home with siblings [Parents] : receives care from parents [In Child's Home] : in the child's home [Good] : good [FreeTextEntry5] : Attends [facility] [FreeTextEntry1] : HM and HPV immunization Abdominal Pain, N/V/D

## 2022-04-17 NOTE — ED PROVIDER NOTE - CARE PLAN
Principal Discharge DX:	Abdominal pain   1 Principal Discharge DX:	Acute upper GI bleeding  Secondary Diagnosis:	Acute respiratory failure, unspecified whether with hypoxia or hypercapnia

## 2022-04-17 NOTE — H&P ADULT - NSHPREVIEWOFSYSTEMS_GEN_ALL_CORE
CONSTITUTIONAL: No weakness, fevers or chills  EYES/ENT: No visual changes;  No vertigo or throat pain   NECK: No pain or stiffness  RESPIRATORY: No cough, wheezing, hemoptysis; No shortness of breath  CARDIOVASCULAR: No chest pain or palpitations  GASTROINTESTINAL: No abdominal or epigastric pain. admits nausea and vomiting, No hematemesis; No diarrhea or constipation. No melena or hematochezia.  GENITOURINARY: No dysuria, frequency or hematuria  NEUROLOGICAL: No numbness or weakness  SKIN: No itching, burning, rashes, or lesions   All other review of systems is negative unless indicated above. CONSTITUTIONAL: No weakness, fevers or chills  EYES/ENT: No visual changes;  No vertigo or throat pain   NECK: No pain or stiffness  RESPIRATORY: No cough, wheezing, hemoptysis; No shortness of breath  CARDIOVASCULAR: No chest pain or palpitations  GASTROINTESTINAL: No abdominal or epigastric pain. admits nausea and vomiting, No hematemesis; No diarrhea or constipation. No melena or hematochezia.  GENITOURINARY: No dysuria, frequency or hematuria  NEUROLOGICAL: No numbness or weakness  SKIN: No itching, burning, rashes, or lesions

## 2022-04-17 NOTE — H&P ADULT - NSICDXFAMILYHX_GEN_ALL_CORE_FT
Telephone Encounter by Jocelyne Rios NCMA at 01/06/17 11:10 AM     Author:  Jocelyne Rios NCMA Service:  (none) Author Type:  Certified Medical Assistant     Filed:  01/06/17 11:11 AM Encounter Date:  1/4/2017 Status:  Signed     :  Jocelyne Rios NCMA (Certified Medical Assistant)            FWD to DR Hollingsworth. Please advise . Okay to place referral?[BP1.1M]       Revision History        User Key Date/Time User Provider Type Action    > BP1.1 01/06/17 11:11 AM Jocelyne Rios NCMA Certified Medical Assistant Sign    M - Manual             FAMILY HISTORY:  Family history of CVA, mom, age 57

## 2022-04-17 NOTE — ED ADULT NURSE NOTE - NSFALLRSKHARMRISK_ED_ALL_ED
March 4, 2019       Edgar Garcia MD  [address]  [facility] [address]  VIA Facsimile: [phone]      Patient: Amol Blank   YOB: 1968   Date of Visit: 3/4/2019       Dear Dr. Garcia:    I saw your patient, Amol Blank, for an evaluation. Below are my notes for this visit with him.    If you have questions, please do not hesitate to call me.      Sincerely,        Elyse Shaikh CNP        CC: No Recipients  Elyse Shaikh CNP  3/4/2019 10:44 AM  Sign at close encounter  No chief complaint on file.      HISTORY OF PRESENT ILLNESS:    Amol Blank is a 50 year old male  who presented today for follow up for hospital discharge follow-up. s/p NSTEMI and PCI with HARI to 95% stenotic mid-RCA on 1/29/19, HTN, HLD, recent smoker and anxiety, who presented the ED after experiencing palpitations.  There were no arrhythmias associated with the palpitations.  However, it was noted the patient's hemoglobin is was mildly low.  He underwent a EGD which did not show a clear source of bleeding, there was duodenitis.  Patient was placed on proton pump inhibitor.      Denies chest pain, shortness of breath, dizziness, palpitations, presyncope.  Denies orthopnea, PND, abdominal distention, rapid weight gain, no lower extremity edema.  Reports  left arm pain starting from the under the arm down to the fingertips which she describes as arthritic type of pain but is the same discomfort that he had when he had his MI.  The intensity of the pain has markedly reduced however.  There is no relationship to activity.    Patient has now stopped smoking.    Past Medical History:   Diagnosis Date   • Heart attack (CMS/HCC)    • Sinusitis      Past Surgical History:   Procedure Laterality Date   • Cardiac catherization  01/2019    mid RCA 95% and inf. branch of ramus 50%, s/p 3.25 x 15mm Xience Liliana HARI to mid RCA successfully.     ALLERGIES:  No Known Allergies  Social History     Tobacco Use   •  Smoking status: Former Smoker   • Smokeless tobacco: Former User   • Tobacco comment: 1/2019   Substance Use Topics   • Alcohol use: No     Frequency: Never   • Drug use: No      Family History   Problem Relation Age of Onset   • Myocardial Infarction Mother    • Heart disease Sister      Outpatient Encounter Medications as of 3/4/2019   Medication Sig Dispense Refill   • fluticasone (FLONASE) 50 MCG/ACT nasal spray 2 sprays in each nostril every morning and night x 3 days then reduce to 1 spray in each nostril every morning and night     • Melatonin 5 MG Chew Tab      • nitroGLYcerin (NITROSTAT) 0.4 MG sublingual tablet Place 1 tablet under the tongue every 5 minutes as needed for Chest pain. 90 tablet 12   • aspirin 81 MG tablet Take 81 mg by mouth daily.     • atorvastatin (LIPITOR) 80 MG tablet Take 80 mg by mouth daily.     • metoPROLOL succinate (TOPROL-XL) 50 MG 24 hr tablet Take 50 mg by mouth daily.     • ticagrelor (BRILINTA) 90 MG Tab Take by mouth 2 times daily.     • ALPRAZolam (XANAX) 0.25 MG tablet Take 0.25 mg by mouth nightly as needed.       No facility-administered encounter medications on file as of 3/4/2019.         Review of Systems   Constitution: Negative for weakness, weight gain and weight loss.   HENT: Negative for nosebleeds.    Eyes: Negative for blurred vision and visual disturbance.   Cardiovascular: Negative for chest pain, claudication, cyanosis, dyspnea on exertion, irregular heartbeat, leg swelling, near-syncope, orthopnea, palpitations, paroxysmal nocturnal dyspnea and syncope.   Respiratory: Negative for cough, hemoptysis, shortness of breath, sleep disturbances due to breathing, snoring and wheezing.    Hematologic/Lymphatic: Negative for bleeding problem. Does not bruise/bleed easily.   Skin: Negative for color change, nail changes and poor wound healing.   Musculoskeletal: Negative for arthritis, back pain, joint pain, joint swelling, muscle cramps, muscle weakness and  myalgias.   Gastrointestinal: Negative for bloating, abdominal pain, hematemesis, hematochezia, melena and nausea.   Genitourinary: Negative.    Neurological: Negative for excessive daytime sleepiness, dizziness, light-headedness, numbness, paresthesias and vertigo.   Psychiatric/Behavioral: Negative for depression. The patient does not have insomnia and is not nervous/anxious.    Allergic/Immunologic: Negative.        There were no vitals filed for this visit.  Physical Exam   Constitutional: He is oriented to person, place, and time. He appears well-developed and well-nourished.   HENT:   Head: Normocephalic and atraumatic.   Nose: Nose normal.   Eyes: EOM are normal. Pupils are equal, round, and reactive to light.   Neck: Normal range of motion. Neck supple. No JVD present.   Cardiovascular: Normal rate, regular rhythm, normal heart sounds and intact distal pulses.   No murmur heard.  Pulmonary/Chest: Effort normal and breath sounds normal.   Abdominal: Soft. Bowel sounds are normal.   Musculoskeletal: Normal range of motion.   Neurological: He is alert and oriented to person, place, and time.   Skin: Skin is warm and dry. Capillary refill takes less than 2 seconds.   Psychiatric: He has a normal mood and affect. His behavior is normal.   Nursing note and vitals reviewed.          LABS  No results found for: WBC, RBC, HGB, HCT, PLT, NEUT, LYMP, MON, EOS   No results found for: SODIUM, POTASSIUM, CHLORIDE, CO2, BUN, CREATININE, CALCIUM, ALBUMIN, TP, BILIRUBIN, ALKPT, GPT, AST, GLUCOSE, HGBA1C  No results found for: CHOLESTEROL, TRIGLYCERIDE, HDL, CALCLDL   No results found for: TSH, T4FREE     CARDIAC DIAGNOSTIC TESTS/IMAGING      IMPRESSION/PLAN:  Amol Blank is a 50 year old male  who presented today for follow up for hospital discharge . s/p NSTEMI and PCI with HARI to 95% stenotic mid-RCA on 1/29/19, HTN, HLD, recent smoker and anxiety.  He reports left arm discomfort with no relationship to activity.  He has  undergone stress test to initiate cardiac rehab but was only able to achieve 78% of the predicted target heart rate.  The EKG today did not show any acute changes.    1. Essential hypertension    2. Familial hypercholesterolemia    3. Subsequent non-ST elevation (NSTEMI) myocardial infarction (CMS/HCC)        No orders of the defined types were placed in this encounter.      PLAN:  We will check CBC today  If hemoglobin is stable, patient may enroll in cardiac rehab phase 2.  Patient will follow up with Dr. Nikko Goldberg in May as scheduled.  No Follow-up on file.    Elyse Shaikh, CNP              no

## 2022-04-17 NOTE — H&P ADULT - ASSESSMENT
70 yo F PMHx HTN, DM2,  LBBB, chronic lymphedema, HFreF (EF 40-45%)  presents to ED with coffee ground emesis. Admitted for likely UGIB.     #UGIB    #CKD    #HTN    #DM2    #HFrEF    #Need for prophylactic Measure 70 yo F PMHx HTN, DM2,  LBBB, chronic lymphedema, HFreF (EF 40-45%)  presents to ED with coffee ground emesis. Admitted for likely UGIB.     #UGIB  - patient presenting with sudden onset coffee ground emesis  - denies chronic nsaid or steroid use. Denies etoh use  - CT A/P: no acute pathology  - HH wnl, vitals stable  - continue to trend CBC, transfuse for Hg <7  - s/p protonix 40mg in ED, continue protonic 40mg IVP BID  - CLD diet for now  - GI Dr. Luna consulted, recs appreciated       #AHRF  - patient desaturated on room air to 86%  - SPO2 now 96% on 2LNC  - s/p lasix 60mg IVP x 1 in ED  - does not appear to be volume overloaded on my exam  - monitor respiratory status  - likely has LELIA as well, Pulm Dr. Duarte consulted, recs appreciated    #DUNCAN on CKD  - Cr 1.20 on admission, baseline appears to be around 1  - likely pre-renal in setting of vomiting  - hold off on IVF for now in setting of hypoxia from possible ADHF  - avoid nephro toxins were possible  - cont to monitor renal function    #HTN  - on enalipril 10mg, metoprolol succ 100mg qd, continue  - hold ace in setting of DUNCNA    #DM2  - takes lantus 35 u qhs and novolg 4-5 units TID  - start lantus 17u qhs and low dose sliding scale with hypoglycemia protocol, accuchecks  - HA1C 6.7 last admission      #HFrEF  - TTE 4/4/22 EF 40-45%   - takes torsemide 20mg PO qdaily, continue  - continue home metoprolol 100mg qd    #Need for prophylactic Measure  - DVT ppx with lovenox 40mg subq 70 yo F PMHx HTN, DM2,  LBBB, chronic lymphedema, HFreF (EF 40-45%)  presents to ED with coffee ground emesis. Admitted for likely UGIB.     #UGIB  - patient presenting with sudden onset coffee ground emesis  - denies chronic nsaid or steroid use. Denies etoh use  - CT A/P: no acute pathology  - HH wnl, vitals stable  - continue to trend CBC, transfuse for Hg <7  - s/p protonix 40mg in ED, continue protonic 40mg IVP BID  - CLD diet for now  - GI Dr. Luna consulted, recs appreciated       #AHRF  - patient desaturated on room air to 86%  - unclear etiology  - SPO2 now 96% on 2LNC  - s/p lasix 60mg IVP x 1 in ED  - does not appear to be volume overloaded on my exam  - monitor respiratory status  - likely has LELIA as well, Pulm Dr. Duarte consulted, recs appreciated    #DUNCAN on CKD  - Cr 1.20 on admission, baseline appears to be around 1  - likely pre-renal in setting of vomiting  - hold off on IVF for now in setting of hypoxia from possible ADHF  - avoid nephro toxins were possible  - cont to monitor renal function    #Leukocytosis  - WBC 11k on admission  - likely reactive  - cont to trend    #HTN  - on enalipril 10mg, metoprolol succ 100mg qd, continue  - hold ace in setting of DUNCAN    #HLD  - continue home atorvastatin 80mg qd    #DM2  - takes lantus 35 u qhs and novolg sliding scale (usually 4-5 units TID)  - start lantus 17u qhs and low dose sliding scale with hypoglycemia protocol, accu checks  - HA1C 6.7 last admission      #HFrEF  - TTE 4/4/22 EF 40-45%   - takes torsemide 20mg PO qdaily, continue for now. Watch renal indices  - continue home metoprolol 100mg qd    #Need for prophylactic Measure  - DVT ppx with lovenox 40mg subq 72 yo F PMHx HTN, DM2, LBBB, chronic lymphedema, HFrEF (EF 35-40%) presents to ED with intractable coffee colored emesis. Admitted for vomiting, r/o UGIB. Also with acute hypoxic respiratory failure while in the ED.    #Vomiting  - patient presenting with sudden onset coffee colored emesis - r/o UGIB - check FOBT  - denies chronic nsaid or steroid use. Denies etoh use ("once a year on my birthday")  - CT A/P: no acute pathology  - HH wnl, vitals stable  - continue to trend CBC, transfuse for Hg <7  - s/p protonix 40mg in ED, continue protonix 40mg IVP BID  - antiemetics PRN  - CLD diet for now  - GI Dr. Luna consulted, recs appreciated     #Acute hypoxic respiratory failure  - patient desaturated on room air to 86% while in ED  - unclear etiology but suspect due to LELIA/OHS as she was sleeping  - SPO2 now 96% on 2LNC - weaned off to RA again and in 90s, will continue to monitor  - s/p lasix 60mg IVP x 1 in ED  - does not appear to be volume overloaded on my exam  - monitor respiratory status  - likely has LELIA as well, Pulm Dr. Duarte consulted, recs appreciated    #Leukocytosis  - WBC 11k on admission  - likely reactive  - cont to trend    #HTN  - BP stable  - on enalapril 10mg, metoprolol succ 100mg qd  - will c/w bb and hold ACE-inhibitor for now given slightly elevated Cr  - given HFrEF, would resume as soon as possible if sCr is stable  - monitor hemodynamics    #HLD  - continue home atorvastatin 80mg qd    #DM2 on longterm insulin with history of skin complication  - takes lantus 35 u qhs and novolg sliding scale (usually 4-5 units TID)  - start lantus 17u qhs and low dose sliding scale with hypoglycemia protocol, accu checks  - HA1C 6.7 last admission    #HFrEF  - TTE 4/4/22 EF 35-40%, recently diagnosed systolic CHF   - takes torsemide 20mg PO qdaily, continue for now. Watch renal indices  - continue home metoprolol succinate 100mg qd  - resume ACE inhibitor if BP is elevated and/or Cr is stable  - outpatient cards eval to consider replace ACE-I with ARNI (would require 36hrs off ACE-inhibitor)    #Need for prophylactic Measure  - DVT ppx with SCDs for now. Start LMWH ppx if FOBT is negative.

## 2022-04-17 NOTE — H&P ADULT - ATTENDING COMMENTS
72 yo F PMHx HTN, DM2, LBBB, chronic lymphedema, HFrEF (EF 35-40%) presents to ED with intractable coffee colored emesis. Admitted for vomiting, r/o UGIB. Also with acute hypoxic respiratory failure while in the ED. Admit to telemetry. GI consulted by ER and recommending admission. Check FOBT. Hold chemical VTE ppx for now. Protonix BID. CLD, antiemetics PRN. GI Dr. Luna follow up. Acute hypoxic respiratory failure in ED while sleeping - 86% on RA, improved with supplemental O2. Weaned off to room air again and saturating in 90s. Continue to monitor on continuous pulse oximetry. Has HFrEF but does not appear sig volume overloaded. Given 60mg IV lasix in ED. Continue home torsemide. Pulm consult as suspect etiology of her brief respiratory failure is due to LELIA/OHS. Discussed with patient at bedside.    Agree with H&P as outlined above, edited where appropriate.

## 2022-04-17 NOTE — H&P ADULT - NSHPLABSRESULTS_GEN_ALL_CORE
.  < from: CT Abdomen and Pelvis No Cont (04.17.22 @ 22:47) >      ACC: [id number] EXAM:  CT ABDOMEN AND PELVIS                          PROCEDURE DATE:  04/17/2022          INTERPRETATION:  CLINICAL INFORMATION: Vomiting and coffee ground emesis    COMPARISON: None.    CONTRAST/COMPLICATIONS:  IV Contrast: NONE  Oral Contrast: NONE  Complications: None reported at time of study completion    PROCEDURE:  CT of the Abdomen and Pelvis was performed.  Sagittal and coronal reformats were performed.    Note: The exam is limited because some types of pathology may notbe   adequately demonstrated due to lack of contrast enhancement.    FINDINGS:  LOWER CHEST: Clear    LIVER: Unremarkable, unenhanced appearance  BILE DUCTS: Unremarkable, unenhanced appearance  GALLBLADDER: Unremarkable, unenhanced appearance  SPLEEN: Unremarkable, unenhanced appearance  PANCREAS: Unremarkable, unenhanced appearance  ADRENALS: Unremarkable, unenhanced appearance  KIDNEYS/URETERS: Unremarkable, unenhanced appearance    BLADDER: Unremarkable, unenhanced appearance  REPRODUCTIVE ORGANS: Unremarkable, unenhanced appearance    BOWEL: No bowel obstruction. Normal appendix.  PERITONEUM: No free air or free fluid  VESSELS: Atherosclerotic changes without aortic aneurysm  RETROPERITONEUM/LYMPH NODES: No enlarged lymph node measuring greater   than 10 mm in short axis.  ABDOMINAL WALL: Unremarkable, unenhanced appearance  BONES: Multilevel degenerative change. Grade 1 anterolisthesis at the   lumbosacral junction secondary to chronic bilateral L5 pars   interarticularis defects.    IMPRESSION:  Unremarkable, unenhanced CT of the abdomen and pelvis.    --- End of Report ---    ERICA MORSE MD; Attending Radiologist  This document has been electronically signed. Apr 17 2022 11:35PM    < end of copied text >    EKG is ordered and pending.    Labs and Imaging all personally reviewed by the attending physician.

## 2022-04-18 DIAGNOSIS — K92.2 GASTROINTESTINAL HEMORRHAGE, UNSPECIFIED: ICD-10-CM

## 2022-04-18 LAB
ALBUMIN SERPL ELPH-MCNC: 2.9 G/DL — LOW (ref 3.3–5)
ALP SERPL-CCNC: 64 U/L — SIGNIFICANT CHANGE UP (ref 40–120)
ALT FLD-CCNC: 21 U/L — SIGNIFICANT CHANGE UP (ref 12–78)
ANION GAP SERPL CALC-SCNC: 6 MMOL/L — SIGNIFICANT CHANGE UP (ref 5–17)
AST SERPL-CCNC: 22 U/L — SIGNIFICANT CHANGE UP (ref 15–37)
BASOPHILS # BLD AUTO: 0.1 K/UL — SIGNIFICANT CHANGE UP (ref 0–0.2)
BASOPHILS NFR BLD AUTO: 1 % — SIGNIFICANT CHANGE UP (ref 0–2)
BILIRUB SERPL-MCNC: 0.9 MG/DL — SIGNIFICANT CHANGE UP (ref 0.2–1.2)
BUN SERPL-MCNC: 29 MG/DL — HIGH (ref 7–23)
CALCIUM SERPL-MCNC: 8.7 MG/DL — SIGNIFICANT CHANGE UP (ref 8.5–10.1)
CHLORIDE SERPL-SCNC: 101 MMOL/L — SIGNIFICANT CHANGE UP (ref 96–108)
CK MB BLD-MCNC: <4.3 % — HIGH (ref 0–3.5)
CK MB CFR SERPL CALC: <1 NG/ML — SIGNIFICANT CHANGE UP (ref 0–3.6)
CK SERPL-CCNC: 23 U/L — LOW (ref 26–192)
CO2 SERPL-SCNC: 33 MMOL/L — HIGH (ref 22–31)
CREAT SERPL-MCNC: 1.1 MG/DL — SIGNIFICANT CHANGE UP (ref 0.5–1.3)
EGFR: 54 ML/MIN/1.73M2 — LOW
EOSINOPHIL # BLD AUTO: 0.05 K/UL — SIGNIFICANT CHANGE UP (ref 0–0.5)
EOSINOPHIL NFR BLD AUTO: 0.5 % — SIGNIFICANT CHANGE UP (ref 0–6)
GLUCOSE SERPL-MCNC: 225 MG/DL — HIGH (ref 70–99)
HCT VFR BLD CALC: 40.4 % — SIGNIFICANT CHANGE UP (ref 34.5–45)
HGB BLD-MCNC: 13.1 G/DL — SIGNIFICANT CHANGE UP (ref 11.5–15.5)
IMM GRANULOCYTES NFR BLD AUTO: 0.9 % — SIGNIFICANT CHANGE UP (ref 0–1.5)
LYMPHOCYTES # BLD AUTO: 0.97 K/UL — LOW (ref 1–3.3)
LYMPHOCYTES # BLD AUTO: 9.2 % — LOW (ref 13–44)
MCHC RBC-ENTMCNC: 28 PG — SIGNIFICANT CHANGE UP (ref 27–34)
MCHC RBC-ENTMCNC: 32.4 GM/DL — SIGNIFICANT CHANGE UP (ref 32–36)
MCV RBC AUTO: 86.3 FL — SIGNIFICANT CHANGE UP (ref 80–100)
MONOCYTES # BLD AUTO: 0.91 K/UL — HIGH (ref 0–0.9)
MONOCYTES NFR BLD AUTO: 8.7 % — SIGNIFICANT CHANGE UP (ref 2–14)
NEUTROPHILS # BLD AUTO: 8.39 K/UL — HIGH (ref 1.8–7.4)
NEUTROPHILS NFR BLD AUTO: 79.7 % — HIGH (ref 43–77)
NRBC # BLD: 0 /100 WBCS — SIGNIFICANT CHANGE UP (ref 0–0)
PLATELET # BLD AUTO: 211 K/UL — SIGNIFICANT CHANGE UP (ref 150–400)
POTASSIUM SERPL-MCNC: 4 MMOL/L — SIGNIFICANT CHANGE UP (ref 3.5–5.3)
POTASSIUM SERPL-SCNC: 4 MMOL/L — SIGNIFICANT CHANGE UP (ref 3.5–5.3)
PROT SERPL-MCNC: 7.1 G/DL — SIGNIFICANT CHANGE UP (ref 6–8.3)
RBC # BLD: 4.68 M/UL — SIGNIFICANT CHANGE UP (ref 3.8–5.2)
RBC # FLD: 15.3 % — HIGH (ref 10.3–14.5)
SODIUM SERPL-SCNC: 140 MMOL/L — SIGNIFICANT CHANGE UP (ref 135–145)
TROPONIN I, HIGH SENSITIVITY RESULT: 26.4 NG/L — SIGNIFICANT CHANGE UP
WBC # BLD: 10.51 K/UL — HIGH (ref 3.8–10.5)
WBC # FLD AUTO: 10.51 K/UL — HIGH (ref 3.8–10.5)

## 2022-04-18 PROCEDURE — 93010 ELECTROCARDIOGRAM REPORT: CPT

## 2022-04-18 PROCEDURE — 99232 SBSQ HOSP IP/OBS MODERATE 35: CPT

## 2022-04-18 RX ORDER — ONDANSETRON 8 MG/1
4 TABLET, FILM COATED ORAL EVERY 8 HOURS
Refills: 0 | Status: DISCONTINUED | OUTPATIENT
Start: 2022-04-18 | End: 2022-04-19

## 2022-04-18 RX ORDER — LANOLIN ALCOHOL/MO/W.PET/CERES
3 CREAM (GRAM) TOPICAL AT BEDTIME
Refills: 0 | Status: DISCONTINUED | OUTPATIENT
Start: 2022-04-18 | End: 2022-04-20

## 2022-04-18 RX ORDER — METOPROLOL TARTRATE 50 MG
100 TABLET ORAL DAILY
Refills: 0 | Status: DISCONTINUED | OUTPATIENT
Start: 2022-04-18 | End: 2022-04-20

## 2022-04-18 RX ORDER — GLUCAGON INJECTION, SOLUTION 0.5 MG/.1ML
1 INJECTION, SOLUTION SUBCUTANEOUS ONCE
Refills: 0 | Status: DISCONTINUED | OUTPATIENT
Start: 2022-04-18 | End: 2022-04-20

## 2022-04-18 RX ORDER — SODIUM CHLORIDE 9 MG/ML
1000 INJECTION, SOLUTION INTRAVENOUS
Refills: 0 | Status: DISCONTINUED | OUTPATIENT
Start: 2022-04-18 | End: 2022-04-20

## 2022-04-18 RX ORDER — INSULIN LISPRO 100/ML
VIAL (ML) SUBCUTANEOUS
Refills: 0 | Status: DISCONTINUED | OUTPATIENT
Start: 2022-04-18 | End: 2022-04-20

## 2022-04-18 RX ORDER — INSULIN LISPRO 100/ML
4 VIAL (ML) SUBCUTANEOUS ONCE
Refills: 0 | Status: COMPLETED | OUTPATIENT
Start: 2022-04-18 | End: 2022-04-18

## 2022-04-18 RX ORDER — ENOXAPARIN SODIUM 100 MG/ML
40 INJECTION SUBCUTANEOUS EVERY 12 HOURS
Refills: 0 | Status: DISCONTINUED | OUTPATIENT
Start: 2022-04-18 | End: 2022-04-18

## 2022-04-18 RX ORDER — DEXTROSE 50 % IN WATER 50 %
12.5 SYRINGE (ML) INTRAVENOUS ONCE
Refills: 0 | Status: DISCONTINUED | OUTPATIENT
Start: 2022-04-18 | End: 2022-04-20

## 2022-04-18 RX ORDER — INSULIN LISPRO 100/ML
VIAL (ML) SUBCUTANEOUS AT BEDTIME
Refills: 0 | Status: DISCONTINUED | OUTPATIENT
Start: 2022-04-18 | End: 2022-04-18

## 2022-04-18 RX ORDER — ACETAMINOPHEN 500 MG
650 TABLET ORAL EVERY 6 HOURS
Refills: 0 | Status: DISCONTINUED | OUTPATIENT
Start: 2022-04-18 | End: 2022-04-20

## 2022-04-18 RX ORDER — INSULIN GLARGINE 100 [IU]/ML
17 INJECTION, SOLUTION SUBCUTANEOUS AT BEDTIME
Refills: 0 | Status: DISCONTINUED | OUTPATIENT
Start: 2022-04-18 | End: 2022-04-19

## 2022-04-18 RX ORDER — DEXTROSE 50 % IN WATER 50 %
15 SYRINGE (ML) INTRAVENOUS ONCE
Refills: 0 | Status: DISCONTINUED | OUTPATIENT
Start: 2022-04-18 | End: 2022-04-20

## 2022-04-18 RX ORDER — PETROLATUM,WHITE
1 JELLY (GRAM) TOPICAL
Refills: 0 | Status: DISCONTINUED | OUTPATIENT
Start: 2022-04-18 | End: 2022-04-20

## 2022-04-18 RX ORDER — INSULIN LISPRO 100/ML
VIAL (ML) SUBCUTANEOUS
Refills: 0 | Status: DISCONTINUED | OUTPATIENT
Start: 2022-04-18 | End: 2022-04-18

## 2022-04-18 RX ORDER — ATORVASTATIN CALCIUM 80 MG/1
80 TABLET, FILM COATED ORAL AT BEDTIME
Refills: 0 | Status: DISCONTINUED | OUTPATIENT
Start: 2022-04-18 | End: 2022-04-20

## 2022-04-18 RX ORDER — PANTOPRAZOLE SODIUM 20 MG/1
40 TABLET, DELAYED RELEASE ORAL EVERY 12 HOURS
Refills: 0 | Status: DISCONTINUED | OUTPATIENT
Start: 2022-04-18 | End: 2022-04-20

## 2022-04-18 RX ORDER — DEXTROSE 50 % IN WATER 50 %
25 SYRINGE (ML) INTRAVENOUS ONCE
Refills: 0 | Status: DISCONTINUED | OUTPATIENT
Start: 2022-04-18 | End: 2022-04-20

## 2022-04-18 RX ORDER — INSULIN LISPRO 100/ML
VIAL (ML) SUBCUTANEOUS AT BEDTIME
Refills: 0 | Status: DISCONTINUED | OUTPATIENT
Start: 2022-04-18 | End: 2022-04-20

## 2022-04-18 RX ADMIN — PANTOPRAZOLE SODIUM 40 MILLIGRAM(S): 20 TABLET, DELAYED RELEASE ORAL at 06:10

## 2022-04-18 RX ADMIN — Medication 20 MILLIGRAM(S): at 06:10

## 2022-04-18 RX ADMIN — Medication 100 MILLIGRAM(S): at 06:10

## 2022-04-18 RX ADMIN — ATORVASTATIN CALCIUM 80 MILLIGRAM(S): 80 TABLET, FILM COATED ORAL at 21:16

## 2022-04-18 RX ADMIN — Medication 6: at 23:07

## 2022-04-18 RX ADMIN — Medication 60 MILLIGRAM(S): at 00:01

## 2022-04-18 RX ADMIN — Medication 20 MILLIGRAM(S): at 23:06

## 2022-04-18 RX ADMIN — INSULIN GLARGINE 17 UNIT(S): 100 INJECTION, SOLUTION SUBCUTANEOUS at 21:54

## 2022-04-18 RX ADMIN — Medication 3: at 12:04

## 2022-04-18 RX ADMIN — PANTOPRAZOLE SODIUM 40 MILLIGRAM(S): 20 TABLET, DELAYED RELEASE ORAL at 17:12

## 2022-04-18 RX ADMIN — Medication 3: at 08:55

## 2022-04-18 RX ADMIN — Medication 4: at 17:12

## 2022-04-18 RX ADMIN — Medication 4: at 21:54

## 2022-04-18 NOTE — PATIENT PROFILE ADULT - FALL HARM RISK - HARM RISK INTERVENTIONS

## 2022-04-18 NOTE — PROGRESS NOTE ADULT - ASSESSMENT
70 yo F PMHx HTN, DM2, LBBB, chronic lymphedema, HFrEF (EF 35-40%) presents to ED with intractable coffee colored emesis. Admitted for vomiting, r/o UGIB. Also with acute hypoxic respiratory failure while in the ED.    Vomiting  - patient presenting with sudden onset brownish emesis.  Patient said not coffee or dark color.  r/o UGIB - check FOBT  - denies chronic nsaid or steroid use. Denies etoh use ("once a year on my birthday")  - CT A/P: no acute pathology  - HH wnl, vitals stable  - Trend CBC, transfuse for Hg <7  - Protonix 40mg IVP BID  - antiemetics PRN  - Clear liquid diet  and will advance   - GI Dr. Luna consulted, agree with mgt      Acute hypoxic respiratory failure  - patient desaturated on room air to 86% while in ED  - unclear etiology but suspect due to LELIA/OHS as she was sleeping  - SPO2 now 96% on 2LNC - weaned off to RA again and in 90s, will continue to monitor  - s/p lasix 60mg IVP x 1 in ED  Continue Torsemide 20mg po daily   - does not appear to be volume overload  -Patient extremely obese and likely has LELIA Pulm Dr. Duatre consulted, recs appreciated    Leukocytosis  - WBC 11k on admission  - likely reactive  - cont to trend    HTN  - BP stable  - on enalapril 10mg, metoprolol succ 100mg qd  - will c/w bb and hold ACE-inhibitor for now given slightly elevated Cr  - given HFrEF, would resume as soon as possible if sCr is stable  - monitor hemodynamics    HLD  - continue home atorvastatin 80mg qd    DM2 on longterm insulin with history of skin complication  - takes lantus 35 u qhs and novolg sliding scale (usually 4-5 units TID)  - start lantus 17u qhs and low dose sliding scale with hypoglycemia protocol, accu checks  - HA1C 6.7 last admission    HFrEF  - TTE 4/4/22 EF 35-40%, recently diagnosed systolic CHF   - takes torsemide 20mg PO qdaily, continue for now. Trend renal function   - continue home metoprolol succinate 100mg qd  - resume ACE inhibitor if BP is elevated and/or Cr is stable  - outpatient cards eval to consider replace ACE-I with ARNI (would require 36hrs off ACE-inhibitor)    Need for prophylactic Measure  - DVT ppx with SCDs for now. Start LMWH ppx if FOBT is negative.    Disp: Patient wishes to be dc .  Pending advance diet and tolerate.  FOBT to rule out GI bleed.  Patient said emesis is not coffee ground .

## 2022-04-18 NOTE — CHART NOTE - NSCHARTNOTEFT_GEN_A_CORE
Called by RN for patient stating she is on Torsemine 20mg BID  - spoke with patient and reviewed chart   - as per dispense history, patient is on torsemide 20mg BID  - will change order from qd to BID Called by RN for patient stating she is on Torsemine 20mg BID  - spoke with patient and reviewed chart   - as per dispense history, patient is on torsemide 20mg BID  - will change order from qd to BID      ADDENDUM 2250  Called by RN for elevated nighttime blood glucose of 417. Patient received 4 units of correctional Admelog, rechecked 1 hour later and 397. Per chart review, patient's blood glucose elevated to 250-300s throughout the day. Will increase sliding scale coverage to moderate dose and give patient an additional 4 units of correctional Admelog and continue Lantus 17units at bedtime. RN to call with changes.

## 2022-04-18 NOTE — CONSULT NOTE ADULT - ASSESSMENT
Vomiting  History HFrEF (EF 35-40%)  Acute respiratory failure    CT noted, d/w patient  No further vomiting  Anti-emetics prn  Clear liquids  Hgb stable, monitor cbc  PPI for prophylaxis  F/u FOBT  Further recs pending above  Will follow    I reviewed the overnight course of events on the unit, re-confirming the patient history. I discussed the care with the patient and their family  Differential diagnosis and plan of care discussed with patient after the evaluation  35 minutes spent on total encounter of which more than fifty percent of the encounter was spent counseling and/or coordinating care by the attending physician.  Advanced care planning was discussed with patient and family.  Advanced care planning forms were reviewed and discussed.  Risks, benefits and alternatives of gastroenterologic procedures were discussed in detail and all questions were answered.

## 2022-04-18 NOTE — CONSULT NOTE ADULT - SUBJECTIVE AND OBJECTIVE BOX
[facility]  Franklin Smith PA-C  [address] [place] [address]  [phone]      Chief Complaint:  Patient is a 71y old  Female who presents with a chief complaint of vomiting, acute respiratory failure (17 Apr 2022 23:17)      HPI:  72 yo F PMHx HTN, DM2, LBBB, chronic lymphedema, HFrEF (EF 35-40%)  presents to ED with vomiting. Patient was in normal state of health until today. She went to [facility] dinner, was unable to eat due to nausea and suddenly started vomiting, non bloody, coffee colored emesis (states that it was not grainy or like coffee grounds). States she vomited >10 times. She had cereal for breakfast and tuna sandwich for lunch, no dinner. Denies any abdominal pain, lightheadedness fever, chest pain, dyspnea, leg pain. Admits to chills. She denies any chronic nsaid or steroid use. Denies etoh use. Abdominal surgical history significant for oophorectomy many years ago. No recent travels. She desaturated in the ED to 86% on RA (while sleeping). She was ordered for 500cc bolus but did not receive most of it due to history of CHF. Her O2 improved with oxygen supplementation and she was weaned off to room air.    Of note, Patient was recently admitted to [facility] 4/1-4/4/22 with CHF exacerbation, treated with IV diuretics and discharged to home on torsemide.    In the ED  VS:T 98, Hr 69, /84-->118/56, RR 20, SPO2 96-> 86% on RA  Labs: WBC 11.72, HH 13.5/42.2, PLTS 240, Na 136, K 3.9, Cr 1.20, Gluc 191, RVP negative, lipase negative  Chest Xray: trace left pleural effusion ( personal read)  CT A/P: no acute pathology  EKG: pending  Given in ED:  zofran 4mg IVP x1, protonix 40mg IVP x1    INTERVAL HPI:  Pt s/e  Pt reports she has not had any more vomiting  Denies recent NSAID or steroid use  Denies prior history of endoscopy or colonoscopy  Denies further GI complaints    Allergies:  penicillins (Hives)    Medications:  acetaminophen     Tablet .. 650 milliGRAM(s) Oral every 6 hours PRN  aluminum hydroxide/magnesium hydroxide/simethicone Suspension 30 milliLiter(s) Oral every 4 hours PRN  atorvastatin 80 milliGRAM(s) Oral at bedtime  dextrose 5%. 1000 milliLiter(s) IV Continuous <Continuous>  dextrose 5%. 1000 milliLiter(s) IV Continuous <Continuous>  dextrose 50% Injectable 25 Gram(s) IV Push once  dextrose 50% Injectable 12.5 Gram(s) IV Push once  dextrose 50% Injectable 25 Gram(s) IV Push once  dextrose Oral Gel 15 Gram(s) Oral once PRN  glucagon  Injectable 1 milliGRAM(s) IntraMuscular once  insulin glargine Injectable (LANTUS) 17 Unit(s) SubCutaneous at bedtime  insulin lispro (ADMELOG) corrective regimen sliding scale   SubCutaneous three times a day before meals  insulin lispro (ADMELOG) corrective regimen sliding scale   SubCutaneous at bedtime  melatonin 3 milliGRAM(s) Oral at bedtime PRN  metoprolol succinate  milliGRAM(s) Oral daily  ondansetron Injectable 4 milliGRAM(s) IV Push every 8 hours PRN  pantoprazole  Injectable 40 milliGRAM(s) IV Push every 12 hours  torsemide 20 milliGRAM(s) Oral daily      PMHX/PSHX:  Hypertension    Diabetes    Lymphedema    H/O left bundle branch block    History of left bundle branch block (LBBB)    H/O cataract    History of surgical removal of meniscus of knee    Frozen shoulder    H/O Achilles tendon repair    Fractured skull        Family history:  No pertinent family history in first degree relatives    Family history of CVA      ROS:  General:  No wt loss, fevers, chills, night sweats, fatigue,   Eyes:  Good vision, no reported pain  ENT:  No sore throat, pain, runny nose, dysphagia  CV:  No pain, palpitations, hypo/hypertension  Resp:  No dyspnea, cough, tachypnea, wheezing  GI:  No pain, +nausea, +vomiting, No diarrhea, No constipation, No weight loss, No fever, No pruritis, No rectal bleeding, No tarry stools, No dysphagia,  :  No pain, bleeding, incontinence, nocturia  Muscle:  No pain, weakness  Neuro:  No weakness, tingling, memory problems  Psych:  No fatigue, insomnia, mood problems, depression  Endocrine:  No polyuria, polydipsia, cold/heat intolerance  Heme:  No petechiae, ecchymosis, easy bruisability  Skin:  No rash, tattoos, scars, edema      PHYSICAL EXAM:   Vital Signs:  Vital Signs Last 24 Hrs  T(C): 36.8 (18 Apr 2022 08:50), Max: 36.8 (18 Apr 2022 08:50)  T(F): 98.2 (18 Apr 2022 08:50), Max: 98.2 (18 Apr 2022 08:50)  HR: 62 (18 Apr 2022 08:50) (60 - 77)  BP: 101/59 (18 Apr 2022 08:50) (101/59 - 153/84)  BP(mean): --  RR: 17 (18 Apr 2022 08:50) (17 - 20)  SpO2: 93% (18 Apr 2022 08:50) (86% - 98%)  Daily Height in cm: 180.34 (17 Apr 2022 19:38)    Daily     GENERAL:  Appears stated age  ABDOMEN:  Soft, non-tender, non-distended  NEURO:  Alert    LABS:                        13.1   10.51 )-----------( 211      ( 18 Apr 2022 06:22 )             40.4     04-18    140  |  101  |  29<H>  ----------------------------<  225<H>  4.0   |  33<H>  |  1.10    Ca    8.7      18 Apr 2022 06:22    TPro  7.1  /  Alb  2.9<L>  /  TBili  0.9  /  DBili  x   /  AST  22  /  ALT  21  /  AlkPhos  64  04-18    LIVER FUNCTIONS - ( 18 Apr 2022 06:22 )  Alb: 2.9 g/dL / Pro: 7.1 g/dL / ALK PHOS: 64 U/L / ALT: 21 U/L / AST: 22 U/L / GGT: x           PT/INR - ( 17 Apr 2022 20:55 )   PT: 13.8 sec;   INR: 1.18 ratio         PTT - ( 17 Apr 2022 20:55 )  PTT:32.0 sec    Amylase Serum--      Lipase serum79       Ammonia--      Imaging:  < from: CT Abdomen and Pelvis No Cont (04.17.22 @ 22:47) >    ACC: [id number] EXAM:  CT ABDOMEN AND PELVIS                          PROCEDURE DATE:  04/17/2022          INTERPRETATION:  CLINICAL INFORMATION: Vomiting and coffee ground emesis    COMPARISON: None.    CONTRAST/COMPLICATIONS:  IV Contrast: NONE  Oral Contrast: NONE  Complications: None reported at time of study completion    PROCEDURE:  CT of the Abdomen and Pelvis was performed.  Sagittal and coronal reformats were performed.    Note: The exam is limited because some types of pathology may notbe   adequately demonstrated due to lack of contrast enhancement.    FINDINGS:  LOWER CHEST: Clear    LIVER: Unremarkable, unenhanced appearance  BILE DUCTS: Unremarkable, unenhanced appearance  GALLBLADDER: Unremarkable, unenhanced appearance  SPLEEN: Unremarkable, unenhanced appearance  PANCREAS: Unremarkable, unenhanced appearance  ADRENALS: Unremarkable, unenhanced appearance  KIDNEYS/URETERS: Unremarkable, unenhanced appearance    BLADDER: Unremarkable, unenhanced appearance  REPRODUCTIVE ORGANS: Unremarkable, unenhanced appearance    BOWEL: No bowel obstruction. Normal appendix.  PERITONEUM: No free air or free fluid  VESSELS: Atherosclerotic changes without aortic aneurysm  RETROPERITONEUM/LYMPH NODES: No enlarged lymph node measuring greater   than 10 mm in short axis.  ABDOMINAL WALL: Unremarkable, unenhanced appearance  BONES: Multilevel degenerative change. Grade 1 anterolisthesis at the   lumbosacral junction secondary to chronic bilateral L5 pars   interarticularis defects.    IMPRESSION:  Unremarkable, unenhanced CT of the abdomen and pelvis.        --- End of Report ---            ERICA MORSE MD; Attending Radiologist  This document has been electronically signed. Apr 17 2022 11:35PM    < end of copied text >

## 2022-04-18 NOTE — ED ADULT NURSE REASSESSMENT NOTE - NS ED NURSE REASSESS COMMENT FT1
Patient admitted and awaiting bed.  Patient noted to desat to 86% on room air.  Patient placed on NC2L with pos result. Lasix administered just prior.  Will continue to monitor.

## 2022-04-19 ENCOUNTER — TRANSCRIPTION ENCOUNTER (OUTPATIENT)
Age: 72
End: 2022-04-19

## 2022-04-19 DIAGNOSIS — I50.22 CHRONIC SYSTOLIC (CONGESTIVE) HEART FAILURE: ICD-10-CM

## 2022-04-19 DIAGNOSIS — E11.9 TYPE 2 DIABETES MELLITUS WITHOUT COMPLICATIONS: ICD-10-CM

## 2022-04-19 DIAGNOSIS — E78.5 HYPERLIPIDEMIA, UNSPECIFIED: ICD-10-CM

## 2022-04-19 DIAGNOSIS — J96.01 ACUTE RESPIRATORY FAILURE WITH HYPOXIA: ICD-10-CM

## 2022-04-19 DIAGNOSIS — R11.10 VOMITING, UNSPECIFIED: ICD-10-CM

## 2022-04-19 DIAGNOSIS — D72.829 ELEVATED WHITE BLOOD CELL COUNT, UNSPECIFIED: ICD-10-CM

## 2022-04-19 DIAGNOSIS — Z29.9 ENCOUNTER FOR PROPHYLACTIC MEASURES, UNSPECIFIED: ICD-10-CM

## 2022-04-19 DIAGNOSIS — N17.9 ACUTE KIDNEY FAILURE, UNSPECIFIED: ICD-10-CM

## 2022-04-19 DIAGNOSIS — I10 ESSENTIAL (PRIMARY) HYPERTENSION: ICD-10-CM

## 2022-04-19 DIAGNOSIS — I89.0 LYMPHEDEMA, NOT ELSEWHERE CLASSIFIED: ICD-10-CM

## 2022-04-19 LAB
ALBUMIN SERPL ELPH-MCNC: 2.8 G/DL — LOW (ref 3.3–5)
ALP SERPL-CCNC: 63 U/L — SIGNIFICANT CHANGE UP (ref 40–120)
ALT FLD-CCNC: 21 U/L — SIGNIFICANT CHANGE UP (ref 12–78)
ANION GAP SERPL CALC-SCNC: 7 MMOL/L — SIGNIFICANT CHANGE UP (ref 5–17)
AST SERPL-CCNC: 18 U/L — SIGNIFICANT CHANGE UP (ref 15–37)
BILIRUB SERPL-MCNC: 0.9 MG/DL — SIGNIFICANT CHANGE UP (ref 0.2–1.2)
BUN SERPL-MCNC: 38 MG/DL — HIGH (ref 7–23)
CALCIUM SERPL-MCNC: 8.6 MG/DL — SIGNIFICANT CHANGE UP (ref 8.5–10.1)
CHLORIDE SERPL-SCNC: 95 MMOL/L — LOW (ref 96–108)
CO2 SERPL-SCNC: 31 MMOL/L — SIGNIFICANT CHANGE UP (ref 22–31)
CREAT SERPL-MCNC: 1.5 MG/DL — HIGH (ref 0.5–1.3)
EGFR: 37 ML/MIN/1.73M2 — LOW
GLUCOSE SERPL-MCNC: 256 MG/DL — HIGH (ref 70–99)
HCT VFR BLD CALC: 37.3 % — SIGNIFICANT CHANGE UP (ref 34.5–45)
HGB BLD-MCNC: 12 G/DL — SIGNIFICANT CHANGE UP (ref 11.5–15.5)
MAGNESIUM SERPL-MCNC: 2.1 MG/DL — SIGNIFICANT CHANGE UP (ref 1.6–2.6)
MCHC RBC-ENTMCNC: 27.8 PG — SIGNIFICANT CHANGE UP (ref 27–34)
MCHC RBC-ENTMCNC: 32.2 GM/DL — SIGNIFICANT CHANGE UP (ref 32–36)
MCV RBC AUTO: 86.5 FL — SIGNIFICANT CHANGE UP (ref 80–100)
NRBC # BLD: 0 /100 WBCS — SIGNIFICANT CHANGE UP (ref 0–0)
PHOSPHATE SERPL-MCNC: 3.5 MG/DL — SIGNIFICANT CHANGE UP (ref 2.5–4.5)
PLATELET # BLD AUTO: 208 K/UL — SIGNIFICANT CHANGE UP (ref 150–400)
POTASSIUM SERPL-MCNC: 3.8 MMOL/L — SIGNIFICANT CHANGE UP (ref 3.5–5.3)
POTASSIUM SERPL-SCNC: 3.8 MMOL/L — SIGNIFICANT CHANGE UP (ref 3.5–5.3)
PROT SERPL-MCNC: 6.9 G/DL — SIGNIFICANT CHANGE UP (ref 6–8.3)
RBC # BLD: 4.31 M/UL — SIGNIFICANT CHANGE UP (ref 3.8–5.2)
RBC # FLD: 15.2 % — HIGH (ref 10.3–14.5)
SODIUM SERPL-SCNC: 133 MMOL/L — LOW (ref 135–145)
WBC # BLD: 8.81 K/UL — SIGNIFICANT CHANGE UP (ref 3.8–10.5)
WBC # FLD AUTO: 8.81 K/UL — SIGNIFICANT CHANGE UP (ref 3.8–10.5)

## 2022-04-19 PROCEDURE — 99233 SBSQ HOSP IP/OBS HIGH 50: CPT | Mod: GC

## 2022-04-19 PROCEDURE — 93010 ELECTROCARDIOGRAM REPORT: CPT

## 2022-04-19 PROCEDURE — 99223 1ST HOSP IP/OBS HIGH 75: CPT

## 2022-04-19 RX ORDER — INSULIN GLARGINE 100 [IU]/ML
25 INJECTION, SOLUTION SUBCUTANEOUS AT BEDTIME
Refills: 0 | Status: DISCONTINUED | OUTPATIENT
Start: 2022-04-19 | End: 2022-04-20

## 2022-04-19 RX ADMIN — Medication 100 MILLIGRAM(S): at 05:51

## 2022-04-19 RX ADMIN — Medication 6: at 17:04

## 2022-04-19 RX ADMIN — PANTOPRAZOLE SODIUM 40 MILLIGRAM(S): 20 TABLET, DELAYED RELEASE ORAL at 17:04

## 2022-04-19 RX ADMIN — Medication 4: at 22:05

## 2022-04-19 RX ADMIN — Medication 6: at 12:05

## 2022-04-19 RX ADMIN — INSULIN GLARGINE 25 UNIT(S): 100 INJECTION, SOLUTION SUBCUTANEOUS at 22:05

## 2022-04-19 RX ADMIN — Medication 6: at 08:07

## 2022-04-19 RX ADMIN — PANTOPRAZOLE SODIUM 40 MILLIGRAM(S): 20 TABLET, DELAYED RELEASE ORAL at 05:50

## 2022-04-19 RX ADMIN — Medication 1 APPLICATION(S): at 05:52

## 2022-04-19 RX ADMIN — Medication 20 MILLIGRAM(S): at 05:51

## 2022-04-19 RX ADMIN — Medication 1 APPLICATION(S): at 17:05

## 2022-04-19 RX ADMIN — ATORVASTATIN CALCIUM 80 MILLIGRAM(S): 80 TABLET, FILM COATED ORAL at 22:04

## 2022-04-19 NOTE — DISCHARGE NOTE PROVIDER - NSDCMRMEDTOKEN_GEN_ALL_CORE_FT
aspirin 81 mg oral tablet, chewable: 1 tab(s) orally once a day  atorvastatin 80 mg oral tablet: 1 tab(s) orally once a day  enalapril 10 mg oral tablet: 1 tab(s) orally once a day  Lantus: 35 unit(s) subcutaneous once a day (at bedtime)  metoprolol succinate 100 mg oral capsule, extended release: 1 cap(s) orally once a day  NovoLOG: patient&#x27;s own sliding scale  potassium chloride 10 mEq oral capsule, extended release: 2 cap(s) orally once a day   Soaanz 20 mg oral tablet: 1 tab(s) orally every 12 hours    aspirin 81 mg oral tablet, chewable: 1 tab(s) orally once a day  atorvastatin 80 mg oral tablet: 1 tab(s) orally once a day  enalapril 10 mg oral tablet: 1 tab(s) orally once a day  Lantus: 35 unit(s) subcutaneous once a day (at bedtime)  metoprolol succinate 100 mg oral capsule, extended release: 1 cap(s) orally once a day  NovoLOG: patient&#x27;s own sliding scale  pantoprazole 40 mg intravenous injection: 40 milligram(s) intravenous every 12 hours  potassium chloride 10 mEq oral capsule, extended release: 2 cap(s) orally once a day   Soaanz 20 mg oral tablet: 1 tab(s) orally every 12 hours    aspirin 81 mg oral tablet, chewable: 1 tab(s) orally once a day  atorvastatin 80 mg oral tablet: 1 tab(s) orally once a day  enalapril 10 mg oral tablet: 1 tab(s) orally once a day  Lantus: 35 unit(s) subcutaneous once a day (at bedtime)  metoprolol succinate 100 mg oral capsule, extended release: 1 cap(s) orally once a day  NovoLOG: patient&#x27;s own sliding scale  pantoprazole 40 mg oral delayed release tablet: 1 tab(s) orally 2 times a day   pantoprazole 40 mg oral delayed release tablet: 1 tab(s) orally once a day   potassium chloride 10 mEq oral capsule, extended release: 2 cap(s) orally once a day   Soaanz 20 mg oral tablet: 1 tab(s) orally every 12 hours    aspirin 81 mg oral tablet, chewable: 1 tab(s) orally once a day  atorvastatin 80 mg oral tablet: 1 tab(s) orally once a day  enalapril 10 mg oral tablet: 1 tab(s) orally once a day  Lantus: 35 unit(s) subcutaneous once a day (at bedtime)  metoprolol succinate 100 mg oral capsule, extended release: 1 cap(s) orally once a day  NovoLOG: patient&#x27;s own sliding scale  pantoprazole 40 mg oral delayed release tablet: 1 tab(s) orally 2 times a day   potassium chloride 10 mEq oral capsule, extended release: 2 cap(s) orally once a day   Soaanz 20 mg oral tablet: 1 tab(s) orally every 12 hours

## 2022-04-19 NOTE — PROGRESS NOTE ADULT - PROBLEM SELECTOR PLAN 3
- T2DM on long-term insulin w/ hx of skin complication. HbA1c 6.7% during last admission.  - on Lantus 35u qhs and Novolog sliding scale (usually 4-5 units TID)  - will increase Lantus from 17u to 25u as patient hyperglycemic overnight to 400s, s/p 20u short acting insulin within 24hr period  - moderate sliding scale in place, hypoglycemia protocol, fingersticks qac, qhs while on diet - per chart review, baseline Cr ~1  - Cr 1.5 today  - s/p torsemide x 1 dose this AM  - would continue to hold enalapril  - hold torsemide in setting of DUNCAN  - consider restarting ACEi and diuretic when Cr improves

## 2022-04-19 NOTE — PROGRESS NOTE ADULT - PROBLEM SELECTOR PLAN 4
Chronic, stable per patient  - hx of recent tx for LE cellulitis  - legs improved at this time per pt   - continue diuresis as above, continue to monitor, outpatient f/u upon d/c - T2DM on long-term insulin w/ hx of skin complication. HbA1c 6.7% during last admission.  - on Lantus 35u qhs and Novolog sliding scale (usually 4-5 units TID)  - will increase Lantus from 17u to 25u as patient hyperglycemic overnight to 400s, s/p 20u short acting insulin within 24hr period  - moderate sliding scale in place, hypoglycemia protocol, fingersticks qac, qhs while on diet

## 2022-04-19 NOTE — PROGRESS NOTE ADULT - PROBLEM SELECTOR PLAN 10
- DVT ppx with SCDs for now. Start LMWH ppx if FOBT is negative.      #hyponatremia   - Na 140 -> 133 this morning  - likely electrolyte derangement in setting of diuretics, lack of po intake  - now pt on regular diet, will continue to monitor serum AM Na's qd    #dispo planning  - PT evaluated, likely home with no skilled PT needs

## 2022-04-19 NOTE — PROGRESS NOTE ADULT - PROBLEM SELECTOR PLAN 6
- /63 this AM  - HOLD enalapril 10mg in setting of DUNCAN  - continue home metoprolol succinate 100mg qd  - given HFrEF, would resume as soon as possible if Cr is stable  - monitor hemodynamics HFrEF  - TTE 4/4/22 EF 35-40%   - HOLD torsemide 20mg PO qd in setting of DUNCAN   - continue home metoprolol succinate 100mg qd  - resume ACE inhibitor if BP is elevated and/or Cr is stable  - outpatient cards eval to consider replace ACE-I with ARNI (would require 36hrs off ACE-inhibitor)  - overnight, patient with 1.6s pause, 12 beats vtach, asymptomatic, AM mag, phos WNL  - cardiology following, Dr Zimmer, recs appreciated

## 2022-04-19 NOTE — CONSULT NOTE ADULT - ATTENDING COMMENTS
Chart reviewed  Pt seen and examined  Agree with plan as outlined    70 yo F PMHx HTN, DM2, LBBB, chronic lymphedema, HFrEF (EF 35-40%) presents to ED with intractable coffee colored emesis. Admitted for vomiting, r/o UGIB. Also with acute hypoxic respiratory failure while in the ED.    #HFrEF   - Most recent Echo showed 4/4/22 EF 35-40%, recently diagnosed systolic CHF   - Hold home Toresmide 20mg BID in setting of DUNCAN   - Hold home Enalapril 20mg OD in setting of DUNCAN   - Continue home Metoprolol 100mg OD   - Monitor Volume Status   - No evidence of fluid overload at this point or acute decompensated HF   - Avoid IVF   - Strict I/Os, and Daily Weights     #HTN   - BP acceptable   - Continue Home Metroprolol 100mg   - And continue to hold Ace and Diuretic due to DUNCAN, re-start when DUNCAN resolves

## 2022-04-19 NOTE — PROGRESS NOTE ADULT - ATTENDING COMMENTS
The patient was seen and evaluated independently by the attending physician.  - I have personally reviewed the pt's labs, imaging, micro data and consultant recommendations.    #possible UGIB, h/h stable, ppi, no plan for scope currently, GI following  #DUNCAN s/p IV lasix from time of admission  #CHF  #chronic lyphedema  #diabetes w/ hyperglycemia, her lantus was cut at time of admission and she's been hyperglycemic since adjustment in insulin    - hold diuretics, monitor renal indices  - no immediate plan for endoscopic evaluation for possible UGIB  - dc planning for 24-48hrs, no needs

## 2022-04-19 NOTE — DISCHARGE NOTE PROVIDER - CARE PROVIDER_API CALL
Dung Patterson (DO)  Internal Medicine  [address], [place] [address]  Phone: [phone]  Fax: [phone]  Established Patient  Follow Up Time: 1 week    Manoj Duarte)  Critical Care Medicine; HospicePalliative Medicine; Internal Medicine; Pulmonary Disease  [address] [place] [address]  Phone: [phone]  Fax: [phone]  Follow Up Time: 1 week   Dung Patterson (DO)  Internal Medicine  [address], [place] [address]  Phone: [phone]  Fax: [phone]  Established Patient  Follow Up Time: 1 week    Manoj Duarte)  Critical Care Medicine; HospicePalliative Medicine; Internal Medicine; Pulmonary Disease  [address] [place] [address]  Phone: [phone]  Fax: [phone]  Follow Up Time: 1 week    Hansel Luna (DO)  Gastroenterology; Internal Medicine  [address] [place] [address]  Phone: [phone]  Fax: [phone]  Follow Up Time: 1 week   Dung Patterson (DO)  Internal Medicine  [address], [place] [address]  Phone: [phone]  Fax: [phone]  Established Patient  Follow Up Time: 1 week    Franklin Linares)  Gastroenterology  [address] [place] [address]  Phone: [phone]  Fax: [phone]  Follow Up Time: 1 week    Darnell Templeton (DO)  Internal Medicine; Pulmonary Disease; Sleep Medicine  [address] [facility], [address]  [place] [address]  Phone: [phone]  Fax: [phone]  Follow Up Time:

## 2022-04-19 NOTE — PROGRESS NOTE ADULT - PROBLEM SELECTOR PLAN 1
- vomiting resolved at this time, unclear etiology, no known recent NSAID/etoh use  - CT A/P: no acute pathology  - obtain FOBT to r/o UGIB  - h/h remains stable, hemodynamically stable  - continue to monitor h/h, trend hg <7  - continue protonix 40mg IVP BID  - antiemetics PRN, cautious with zofran as patient QTc 525ms. repeat ekg obtained today 4/19 QTc 506.   - tolerating regular DASH/consistent carb diet, continue for now  - GI Dr. Luna consulted, agree with mgt

## 2022-04-19 NOTE — PROGRESS NOTE ADULT - ASSESSMENT
Vomiting  History HFrEF (EF 35-40%)  Acute respiratory failure    CT noted, d/w patient  No further vomiting  Anti-emetics prn  Diet as tolerated  Hgb stable, monitor cbc  PPI for prophylaxis  F/u FOBT  Will follow    I reviewed the overnight course of events on the unit, re-confirming the patient history. I discussed the care with the patient and their family  Differential diagnosis and plan of care discussed with patient after the evaluation  35 minutes spent on total encounter of which more than fifty percent of the encounter was spent counseling and/or coordinating care by the attending physician.  Advanced care planning was discussed with patient and family.  Advanced care planning forms were reviewed and discussed.  Risks, benefits and alternatives of gastroenterologic procedures were discussed in detail and all questions were answered.

## 2022-04-19 NOTE — DISCHARGE NOTE PROVIDER - [id number]_ALL_CORE_FT
PRINCIPAL DISCHARGE DIAGNOSIS  Diagnosis: Acute upper GI bleeding  Assessment and Plan of Treatment: you were admitted for vomiting. We believe it may be due to bleed in your gastrointestinal tract. We started you on pantoprazole 40 mg twice a day and your vomiting resolved. your blood counts remained stable.   START PANTOPRAZOLE 40 mg BY MOUTH TWICE DAILY  What are the symptoms of GI bleeding?  black or tarry stool.  bright red blood in vomit.  cramps in the abdomen.  dark or bright red blood mixed with stool.  dizziness or faintness.  feeling tired.  paleness.  shortness of breath.  IF YOU EXPERIENCE ANY OF THE ABOVE SYMPTOMS PLEASE CALL 911 or GO TO YOUR LOCAL EMERGENY ROOM         SECONDARY DISCHARGE DIAGNOSES  Diagnosis: Acute respiratory failure, unspecified whether with hypoxia or hypercapnia  Assessment and Plan of Treatment: Your oxygen levels were low in the hospital. We believe it is secondary to your heart failure. We gave you diuretics in the hospital. Which improved your respiratory status.   Continue torsemide 10 mg Twice daily       Diagnosis: Hypertension  Assessment and Plan of Treatment:      PRINCIPAL DISCHARGE DIAGNOSIS  Diagnosis: Acute upper GI bleeding  Assessment and Plan of Treatment: you were admitted for vomiting. We believe it may be due to bleed in your gastrointestinal tract. We started you on pantoprazole 40 mg twice a day and your vomiting resolved. your blood counts remained stable.   START PANTOPRAZOLE 40 mg BY MOUTH TWICE DAILY  What are the symptoms of GI bleeding?  black or tarry stool.  bright red blood in vomit.  cramps in the abdomen.  dark or bright red blood mixed with stool.  dizziness or faintness.  feeling tired.  paleness.  shortness of breath.  IF YOU EXPERIENCE ANY OF THE ABOVE SYMPTOMS PLEASE CALL 911 or GO TO YOUR LOCAL EMERGENY ROOM         SECONDARY DISCHARGE DIAGNOSES  Diagnosis: Acute respiratory failure, unspecified whether with hypoxia or hypercapnia  Assessment and Plan of Treatment: Your oxygen levels were low in the hospital. We believe it is secondary to your heart failure. We gave you diuretics in the hospital. Which improved your respiratory status.   Continue torsemide 10 mg Twice daily       Diagnosis: Hypertension  Assessment and Plan of Treatment: you may continue your metoprolol and enalipril.    Diagnosis: Diabetes  Assessment and Plan of Treatment:     Diagnosis: Chronic systolic congestive heart failure  Assessment and Plan of Treatment: Continue torsemide 10 mg daily.     PRINCIPAL DISCHARGE DIAGNOSIS  Diagnosis: Acute upper GI bleeding  Assessment and Plan of Treatment: you were admitted for vomiting. We believe it may be due to bleed in your gastrointestinal tract. We started you on pantoprazole 40 mg twice a day and your vomiting resolved. your blood counts remained stable.   START PANTOPRAZOLE 40 mg BY MOUTH TWICE DAILY and follow up with Dr. Luna to determine further management (Gasteroenterology).   What are the symptoms of GI bleeding?  black or tarry stool.  bright red blood in vomit.  cramps in the abdomen.  dark or bright red blood mixed with stool.  dizziness or faintness.  feeling tired.  paleness.  shortness of breath.  IF YOU EXPERIENCE ANY OF THE ABOVE SYMPTOMS PLEASE CALL 911 or GO TO YOUR LOCAL EMERGENY ROOM         SECONDARY DISCHARGE DIAGNOSES  Diagnosis: Acute respiratory failure, unspecified whether with hypoxia or hypercapnia  Assessment and Plan of Treatment: Your oxygen levels were low in the hospital. We believe it is secondary to your heart failure. We gave you diuretics in the hospital. Which improved your respiratory status.   Continue torsemide 10 mg Twice daily       Diagnosis: Hypertension  Assessment and Plan of Treatment: you may continue your metoprolol and enalipril.    Diagnosis: Diabetes  Assessment and Plan of Treatment:     Diagnosis: Chronic systolic congestive heart failure  Assessment and Plan of Treatment: Continue torsemide 10 mg daily.     PRINCIPAL DISCHARGE DIAGNOSIS  Diagnosis: Acute upper GI bleeding  Assessment and Plan of Treatment: you were admitted for vomiting. We believe it may be due to bleed in your gastrointestinal tract. We started you on pantoprazole 40 mg twice a day and your vomiting resolved. your blood counts remained stable.   START PANTOPRAZOLE 40 mg BY MOUTH TWICE DAILY and follow up with Dr. Luna to determine further management (Gasteroenterology).   What are the symptoms of GI bleeding?  black or tarry stool.  bright red blood in vomit.  cramps in the abdomen.  dark or bright red blood mixed with stool.  dizziness or faintness.  feeling tired.  paleness.  shortness of breath.  IF YOU EXPERIENCE ANY OF THE ABOVE SYMPTOMS PLEASE CALL 911 or GO TO YOUR LOCAL EMERGENY ROOM         SECONDARY DISCHARGE DIAGNOSES  Diagnosis: Acute respiratory failure, unspecified whether with hypoxia or hypercapnia  Assessment and Plan of Treatment: Your oxygen levels were low in the hospital. We believe it is secondary to your heart failure. We gave you diuretics in the hospital. Which improved your respiratory status.   Continue torsemide 10 mg Twice daily       Diagnosis: Hypertension  Assessment and Plan of Treatment: you may continue your metoprolol and enalipril.    Diagnosis: Diabetes  Assessment and Plan of Treatment: continue home novolog sliding scale and home lantus 35 units at bedtime.  If blood sugar levels become too low, signs and symptoms can include:  An irregular or fast heartbeat.  Fatigue.  Pale skin.  Shakiness.  Anxiety.  Sweating.  Hunger.  Irritability.    Diagnosis: Chronic systolic congestive heart failure  Assessment and Plan of Treatment: Continue torsemide 10 mg daily.    Diagnosis: Hyperlipidemia  Assessment and Plan of Treatment: continue lipitor 40 mg     PRINCIPAL DISCHARGE DIAGNOSIS  Diagnosis: Acute upper GI bleeding  Assessment and Plan of Treatment: You were admitted for vomiting. We believe it may be due to bleed in your gastrointestinal tract. We started you on pantoprazole 40 mg twice a day and your vomiting resolved. your blood counts remained stable.   START PANTOPRAZOLE 40 mg BY MOUTH TWICE DAILY for 14 days.  ONCE 14 days are completed, STOP PANTOPRAZOLE 40mg BY MOUTH TWICE DAILY and START PANTOPRAZOLE 40mg BY MOUTH ONCE A DAY. Follow up with Dr. Luna (Gasteroenterology) within 1-2 weeks of discharge for further management and/or testing.  What are the symptoms of GI bleeding?  black or tarry stool.  bright red blood in vomit.  cramps in the abdomen.  dark or bright red blood mixed with stool.  dizziness or faintness.  feeling tired.  paleness.  shortness of breath.  IF YOU EXPERIENCE ANY OF THE ABOVE SYMPTOMS PLEASE CALL 911 or GO TO YOUR LOCAL EMERGENY ROOM         SECONDARY DISCHARGE DIAGNOSES  Diagnosis: Acute respiratory failure, unspecified whether with hypoxia or hypercapnia  Assessment and Plan of Treatment: Your oxygen levels were low in the hospital. We believe it is secondary to your heart failure. We gave you diuretics in the hospital. Which improved your respiratory status.   Continue torsemide 10 mg twice daily       Diagnosis: Hypertension  Assessment and Plan of Treatment: You may continue your metoprolol and enalipril.    Diagnosis: Chronic systolic congestive heart failure  Assessment and Plan of Treatment: Continue torsemide 10 mg daily.    Diagnosis: Diabetes  Assessment and Plan of Treatment: continue home novolog sliding scale and home lantus 35 units at bedtime.  If blood sugar levels become too low, signs and symptoms can include:  An irregular or fast heartbeat.  Fatigue.  Pale skin.  Shakiness.  Anxiety.  Sweating.  Hunger.  Irritability.    Diagnosis: Hyperlipidemia  Assessment and Plan of Treatment: Continue lipitor 40 mg     PRINCIPAL DISCHARGE DIAGNOSIS  Diagnosis: Acute upper GI bleeding  Assessment and Plan of Treatment: You were admitted for vomiting. We believe it may be due to bleed in your gastrointestinal tract. We started you on pantoprazole 40 mg twice a day and your vomiting resolved. your blood counts remained stable.   START PANTOPRAZOLE 40 mg BY MOUTH TWICE DAILY for 14 days.  ONCE 14 days are completed, STOP PANTOPRAZOLE 40mg BY MOUTH TWICE DAILY and START PANTOPRAZOLE 40mg BY MOUTH ONCE A DAY. Follow up with Dr. Luna (Gasteroenterology) within 1-2 weeks of discharge for further management and/or testing.  What are the symptoms of GI bleeding?  black or tarry stool.  bright red blood in vomit.  cramps in the abdomen.  dark or bright red blood mixed with stool.  dizziness or faintness.  feeling tired.  paleness.  shortness of breath.  IF YOU EXPERIENCE ANY OF THE ABOVE SYMPTOMS PLEASE CALL 911 or GO TO YOUR LOCAL EMERGENY ROOM         SECONDARY DISCHARGE DIAGNOSES  Diagnosis: Acute respiratory failure, unspecified whether with hypoxia or hypercapnia  Assessment and Plan of Treatment: Your oxygen levels were low in the hospital. We believe it is secondary to your heart failure and may be a component of obstructive sleep apnea. We gave you diuretics in the hospital. Which improved your respiratory status.   Continue torsemide 10 mg twice daily   Please follow up with a pulmonologist when you are discharged from the hospital for further workup for possible obstructive sleep apnea.      Diagnosis: Hypertension  Assessment and Plan of Treatment: You may continue your metoprolol and enalipril.    Diagnosis: Chronic systolic congestive heart failure  Assessment and Plan of Treatment: Continue torsemide 10 mg daily.    Diagnosis: Diabetes  Assessment and Plan of Treatment: continue home novolog sliding scale and home lantus 35 units at bedtime.  If blood sugar levels become too low, signs and symptoms can include:  An irregular or fast heartbeat.  Fatigue.  Pale skin.  Shakiness.  Anxiety.  Sweating.  Hunger.  Irritability.    Diagnosis: Hyperlipidemia  Assessment and Plan of Treatment: Continue lipitor 40 mg     PRINCIPAL DISCHARGE DIAGNOSIS  Diagnosis: Acute upper GI bleeding  Assessment and Plan of Treatment: You were admitted for vomiting. We believe it may be due to bleed in your gastrointestinal tract. We started you on pantoprazole 40 mg twice a day and your vomiting resolved. your blood counts remained stable.   START PANTOPRAZOLE 40 mg BY MOUTH TWICE DAILY for 14 days.  ONCE 14 days are completed, STOP PANTOPRAZOLE 40mg BY MOUTH TWICE DAILY and START PANTOPRAZOLE 40mg BY MOUTH ONCE A DAY as of 5/5/22 Follow up with Dr. Luna (Gasteroenterology) within 1-2 weeks of discharge for further management and/or testing.  What are the symptoms of GI bleeding?  black or tarry stool.  bright red blood in vomit.  cramps in the abdomen.  dark or bright red blood mixed with stool.  dizziness or faintness.  feeling tired.  paleness.  shortness of breath.  IF YOU EXPERIENCE ANY OF THE ABOVE SYMPTOMS PLEASE CALL 911 or GO TO YOUR LOCAL EMERGENY ROOM         SECONDARY DISCHARGE DIAGNOSES  Diagnosis: Acute respiratory failure, unspecified whether with hypoxia or hypercapnia  Assessment and Plan of Treatment: Your oxygen levels were low in the hospital. We believe it is secondary to your heart failure and may be a component of obstructive sleep apnea. We gave you diuretics in the hospital. Which improved your respiratory status.   Continue torsemide 10 mg twice daily   Please follow up with a pulmonologist when you are discharged from the hospital for further workup for possible obstructive sleep apnea.      Diagnosis: Hypertension  Assessment and Plan of Treatment: You may continue your metoprolol and enalipril.    Diagnosis: Chronic systolic congestive heart failure  Assessment and Plan of Treatment: Continue torsemide 10 mg daily.    Diagnosis: Diabetes  Assessment and Plan of Treatment: continue home novolog sliding scale and home lantus 35 units at bedtime.  If blood sugar levels become too low, signs and symptoms can include:  An irregular or fast heartbeat.  Fatigue.  Pale skin.  Shakiness.  Anxiety.  Sweating.  Hunger.  Irritability.    Diagnosis: Hyperlipidemia  Assessment and Plan of Treatment: Continue lipitor 40 mg

## 2022-04-19 NOTE — DISCHARGE NOTE PROVIDER - HOSPITAL COURSE
FROM ADMISSION H+P:   HPI:  70 yo F PMHx HTN, DM2, LBBB, chronic lymphedema, HFrEF (EF 35-40%)  presents to ED with vomiting. Patient was in normal state of health until today. She went to [employer] dinner, was unable to eat due to nausea and suddenly started vomiting, non bloody, coffee colored emesis (states that it was not grainy or like coffee grounds). States she vomited >10 times. She had cereal for breakfast and tuna sandwich for lunch, no dinner. Denies any abdominal pain, lightheadedness fever, chest pain, dyspnea, leg pain. Admits to chills. She denies any chronic nsaid or steroid use. Denies etoh use. Abdominal surgical history significant for oophorectomy many years ago. No recent travels. She desaturated in the ED to 86% on RA (while sleeping). She was ordered for 500cc bolus but did not receive most of it due to history of CHF. Her O2 improved with oxygen supplementation and she was weaned off to room air.    Of note, Patient was recently admitted to [facility] 4/1-4/4/22 with CHF exacerbation, treated with IV diuretics and discharged to home on torsemide.    In the ED  VS:T 98, Hr 69, /84-->118/56, RR 20, SPO2 96-> 86% on RA  Labs: WBC 11.72, HH 13.5/42.2, PLTS 240, Na 136, K 3.9, Cr 1.20, Gluc 191, RVP negative, lipase negative  Chest Xray: trace left pleural effusion ( personal read)  CT A/P: no acute pathology  EKG: pending  Given in ED:  zofran 4mg IVP x1, protonix 40mg IVP x1 (17 Apr 2022 23:17)      ---  HOSPITAL COURSE:   Patient was admitted for upper GI bleed. She was made NPO, and diet was advanced. Fecal occult revealed    . GI sideridis group following. Patient also experienced hypoxemia and found to have a left pleural effusion. On tele 4/19 overnight, patient experienced 1.2 second pause then 12 beats Ventricular tachycardia. Cardiology, Dr. Zimmer consulted and recommended to continue metoprolol on current dose. Patient found to have a left sided pleural effusion on chest x ray. Hypoxemia likely multifactorial, 2/2 to pleural effusion and atelectasis. Patient was given IV lasix with improvement in respiratory status. Patient experienced DUNCAN during the admission, diuretics were held. likely 2/2 to dehydration and pre renal azotemia in the setting of vomiting and diuresis. PT evaluated patient and recommended no skilled PT needs.   ---  CONSULTANTS:   Goran Trejo Cardio     ---  TIME SPENT:  I, the attending physician, was physically present for the key portions of the evaluation and management (E/M) service provided. The total amount of time spent reviewing the hospital notes, laboratory values, imaging findings, assessing/counseling the patient, discussing with consultant physicians, social work, nursing staff was -- minutes    ---  Primary care provider was made aware of plan for discharge:      [  ] NO     [  ] YES   FROM ADMISSION H+P:   HPI:  70 yo F PMHx HTN, DM2, LBBB, chronic lymphedema, HFrEF (EF 35-40%)  presents to ED with vomiting. Patient was in normal state of health until today. She went to [employer] dinner, was unable to eat due to nausea and suddenly started vomiting, non bloody, coffee colored emesis (states that it was not grainy or like coffee grounds). States she vomited >10 times. She had cereal for breakfast and tuna sandwich for lunch, no dinner. Denies any abdominal pain, lightheadedness fever, chest pain, dyspnea, leg pain. Admits to chills. She denies any chronic nsaid or steroid use. Denies etoh use. Abdominal surgical history significant for oophorectomy many years ago. No recent travels. She desaturated in the ED to 86% on RA (while sleeping). She was ordered for 500cc bolus but did not receive most of it due to history of CHF. Her O2 improved with oxygen supplementation and she was weaned off to room air.    Of note, Patient was recently admitted to [facility] 4/1-4/4/22 with CHF exacerbation, treated with IV diuretics and discharged to home on torsemide.    In the ED  VS:T 98, Hr 69, /84-->118/56, RR 20, SPO2 96-> 86% on RA  Labs: WBC 11.72, HH 13.5/42.2, PLTS 240, Na 136, K 3.9, Cr 1.20, Gluc 191, RVP negative, lipase negative  Chest Xray: trace left pleural effusion ( personal read)  CT A/P: no acute pathology  EKG: pending  Given in ED:  zofran 4mg IVP x1, protonix 40mg IVP x1 (17 Apr 2022 23:17)      ---  HOSPITAL COURSE:   Patient was admitted for upper GI bleed. She was made NPO, and diet was advanced. GI sideridis group following. Patient also experienced hypoxemia and found to have a left pleural effusion. On tele 4/19 overnight, patient experienced 1.2 second pause then 12 beats Ventricular tachycardia. Cardiology, Dr. Zimmer consulted and recommended to continue metoprolol on current dose. Patient experienced no more events on telemetry. Patient found to have a left sided pleural effusion on chest x ray. Hypoxemia likely multifactorial, 2/2 to pleural effusion and atelectasis. Patient was given IV lasix with improvement in respiratory status. Patient experienced DUNCAN during the admission, diuretics were held. likely 2/2 to dehydration and pre renal azotemia in the setting of vomiting and diuresis. Patient upgraded to PO diet and tolerated well. PT evaluated patient and recommended no skilled PT needs.   ---  CONSULTANTS:   Goran Luna     ---  TIME SPENT:  I, the attending physician, was physically present for the key portions of the evaluation and management (E/M) service provided. The total amount of time spent reviewing the hospital notes, laboratory values, imaging findings, assessing/counseling the patient, discussing with consultant physicians, social work, nursing staff was -- minutes    ---  Primary care provider was made aware of plan for discharge:      [  ] NO     [  ] YES   FROM ADMISSION H+P:   HPI:  72 yo F PMHx HTN, DM2, LBBB, chronic lymphedema, HFrEF (EF 35-40%)  presents to ED with vomiting. Patient was in normal state of health until today. She went to [employer] dinner, was unable to eat due to nausea and suddenly started vomiting, non bloody, coffee colored emesis (states that it was not grainy or like coffee grounds). States she vomited >10 times. She had cereal for breakfast and tuna sandwich for lunch, no dinner. Denies any abdominal pain, lightheadedness fever, chest pain, dyspnea, leg pain. Admits to chills. She denies any chronic nsaid or steroid use. Denies etoh use. Abdominal surgical history significant for oophorectomy many years ago. No recent travels. She desaturated in the ED to 86% on RA (while sleeping). She was ordered for 500cc bolus but did not receive most of it due to history of CHF. Her O2 improved with oxygen supplementation and she was weaned off to room air.    Of note, Patient was recently admitted to [facility] 4/1-4/4/22 with CHF exacerbation, treated with IV diuretics and discharged to home on torsemide.    In the ED  VS:T 98, Hr 69, /84-->118/56, RR 20, SPO2 96-> 86% on RA  Labs: WBC 11.72, HH 13.5/42.2, PLTS 240, Na 136, K 3.9, Cr 1.20, Gluc 191, RVP negative, lipase negative  Chest Xray: trace left pleural effusion ( personal read)  CT A/P: no acute pathology  EKG: pending  Given in ED:  zofran 4mg IVP x1, protonix 40mg IVP x1 (17 Apr 2022 23:17)      ---  HOSPITAL COURSE:   Patient was admitted for upper GI bleed. She was made NPO at time of admission. GI sideridis group following. Patient also experienced hypoxemia and found to have a left pleural effusion. On tele 4/19 overnight, patient experienced 1.2 second pause then 12 beats Ventricular tachycardia. Cardiology, Dr. Zimmer consulted and recommended to continue metoprolol on current dose. Patient experienced no more events on telemetry. Patient found to have a left sided pleural effusion on chest x ray. Hypoxemia likely multifactorial, 2/2 to pleural effusion and atelectasis. Patient was given IV lasix with improvement in respiratory status. Patient experienced DUNCAN during the admission, diuretics were held. likely 2/2 to dehydration and pre renal azotemia in the setting of vomiting and diuresis. Patient advanced to PO diet and tolerated well. PT evaluated patient and recommended no skilled PT needs. Pt was seen and examined on the day of discharge. Pt is medically optimized for discharge home with close outpatient follow up.    ---  CONSULTANTS:   Goran - Cardio   ALLAN - Luna     T(C): 36.6 (04-20-22 @ 11:32), Max: 36.8 (04-19-22 @ 19:24)  HR: 55 (04-20-22 @ 11:32) (55 - 60)  BP: 122/52 (04-20-22 @ 11:32) (108/62 - 122/52)  RR: 17 (04-20-22 @ 11:32) (17 - 19)  SpO2: 92% (04-20-22 @ 11:32) (91% - 97%)    PHYSICAL EXAM:  GENERAL: NAD, +morbidly obese   HEENT:  anicteric, moist mucous membranes  CHEST/LUNG: decreased breath sounds throughout but CTA  HEART:  RRR, S1, S2  ABDOMEN:  BS+, soft, nontender, nondistended  EXTREMITIES: b/l LE edema and erythema 2/2 lymphedema (chronic per pt). RLE worse edema compared with LLE. LLE worse erythema up to below the knee on L, when compared with RLE.  NERVOUS SYSTEM: answers questions and follows commands appropriately      ---  TIME SPENT:  I, the attending physician, was physically present for the key portions of the evaluation and management (E/M) service provided. The total amount of time spent reviewing the hospital notes, laboratory values, imaging findings, assessing/counseling the patient, discussing with consultant physicians, social work, nursing staff was -- minutes    ---  Primary care provider was made aware of plan for discharge:      [  ] NO     [  ] YES   FROM ADMISSION H+P:   HPI:  72 yo F PMHx HTN, DM2, LBBB, chronic lymphedema, HFrEF (EF 35-40%)  presents to ED with vomiting. Patient was in normal state of health until today. She went to [employer] dinner, was unable to eat due to nausea and suddenly started vomiting, non bloody, coffee colored emesis (states that it was not grainy or like coffee grounds). States she vomited >10 times. She had cereal for breakfast and tuna sandwich for lunch, no dinner. Denies any abdominal pain, lightheadedness fever, chest pain, dyspnea, leg pain. Admits to chills. She denies any chronic nsaid or steroid use. Denies etoh use. Abdominal surgical history significant for oophorectomy many years ago. No recent travels. She desaturated in the ED to 86% on RA (while sleeping). She was ordered for 500cc bolus but did not receive most of it due to history of CHF. Her O2 improved with oxygen supplementation and she was weaned off to room air.    Of note, Patient was recently admitted to [facility] 4/1-4/4/22 with CHF exacerbation, treated with IV diuretics and discharged to home on torsemide.    In the ED  VS:T 98, Hr 69, /84-->118/56, RR 20, SPO2 96-> 86% on RA  Labs: WBC 11.72, HH 13.5/42.2, PLTS 240, Na 136, K 3.9, Cr 1.20, Gluc 191, RVP negative, lipase negative  Chest Xray: trace left pleural effusion ( personal read)  CT A/P: no acute pathology  EKG: pending  Given in ED:  zofran 4mg IVP x1, protonix 40mg IVP x1 (17 Apr 2022 23:17)      ---  HOSPITAL COURSE:   Patient was admitted for upper GI bleed. She was made NPO at time of admission. GI sideridis group following. Patient also experienced hypoxemia and found to have a left pleural effusion. On tele 4/19 overnight, patient experienced 1.2 second pause then 12 beats Ventricular tachycardia. Cardiology, Dr. Zimmer consulted and recommended to continue metoprolol on current dose. Patient experienced no more events on telemetry. Patient found to have a left sided pleural effusion on chest x ray. Hypoxemia likely multifactorial, 2/2 to pleural effusion and atelectasis. Patient was given IV lasix with improvement in respiratory status. Patient experienced DUNCAN during the admission, diuretics were held. likely 2/2 to dehydration and pre renal azotemia in the setting of vomiting and diuresis. Patient advanced to PO diet and tolerated well. PT evaluated patient and recommended no skilled PT needs. Pt was seen and examined on the day of discharge. Pt is medically optimized for discharge home with close outpatient follow up.    ---  CONSULTANTS:   Goran - Cardio    - Luna     T(C): 36.6 (04-20-22 @ 11:32), Max: 36.8 (04-19-22 @ 19:24)  HR: 55 (04-20-22 @ 11:32) (55 - 60)  BP: 122/52 (04-20-22 @ 11:32) (108/62 - 122/52)  RR: 17 (04-20-22 @ 11:32) (17 - 19)  SpO2: 92% (04-20-22 @ 11:32) (91% - 97%)    PHYSICAL EXAM:  GENERAL: NAD, +morbidly obese   HEENT:  anicteric, moist mucous membranes  CHEST/LUNG: decreased breath sounds throughout but CTA  HEART:  RRR, S1, S2  ABDOMEN:  BS+, soft, nontender, nondistended  EXTREMITIES: b/l LE edema and erythema 2/2 lymphedema (chronic per pt). RLE worse edema compared with LLE. LLE worse erythema up to below the knee on L, when compared with RLE.  NERVOUS SYSTEM: answers questions and follows commands appropriately      ---  TIME SPENT:  I, the attending physician, was physically present for the key portions of the evaluation and management (E/M) service provided. The total amount of time spent reviewing the hospital notes, laboratory values, imaging findings, assessing/counseling the patient, discussing with consultant physicians, social work, nursing staff was 70 minutes

## 2022-04-19 NOTE — CONSULT NOTE ADULT - SUBJECTIVE AND OBJECTIVE BOX
CHARTING IN PROGRESS  Patient is a 71y old  Female who presents with a chief complaint of vomiting, acute respiratory failure (18 Apr 2022 16:49)      HPI:  72 yo F PMHx HTN, DM2, LBBB, chronic lymphedema, HFrEF (EF 35-40%)  presents to ED with vomiting. Patient was in normal state of health until today. She went to [employer] dinner, was unable to eat due to nausea and suddenly started vomiting, non bloody, coffee colored emesis (states that it was not grainy or like coffee grounds). States she vomited >10 times. She had cereal for breakfast and tuna sandwich for lunch, no dinner. Denies any abdominal pain, lightheadedness fever, chest pain, dyspnea, leg pain. Admits to chills. She denies any chronic nsaid or steroid use. Denies etoh use. Abdominal surgical history significant for oophorectomy many years ago. No recent travels. She desaturated in the ED to 86% on RA (while sleeping). She was ordered for 500cc bolus but did not receive most of it due to history of CHF. Her O2 improved with oxygen supplementation and she was weaned off to room air.    Of note, Patient was recently admitted to [facility] 4/1-4/4/22 with CHF exacerbation, treated with IV diuretics and discharged to home on torsemide.    In the ED  VS:T 98, Hr 69, /84-->118/56, RR 20, SPO2 96-> 86% on RA  Labs: WBC 11.72, HH 13.5/42.2, PLTS 240, Na 136, K 3.9, Cr 1.20, Gluc 191, RVP negative, lipase negative  Chest Xray: trace left pleural effusion ( personal read)  CT A/P: no acute pathology  EKG: pending  Given in ED:  zofran 4mg IVP x1, protonix 40mg IVP x1 (17 Apr 2022 23:17)    Interval HPI:   ----       PAST MEDICAL & SURGICAL HISTORY:  Hypertension    Diabetes    Lymphedema    H/O left bundle branch block    History of left bundle branch block (LBBB)    H/O cataract  2020    History of surgical removal of meniscus of knee  left in 1971    Frozen shoulder  2000    H/O Achilles tendon repair  lengthened bilaterally, 2000    Fractured skull  1968        ECHO FINDINGS:    MEDICATIONS  (STANDING):  atorvastatin 80 milliGRAM(s) Oral at bedtime  dextrose 5%. 1000 milliLiter(s) (50 mL/Hr) IV Continuous <Continuous>  dextrose 5%. 1000 milliLiter(s) (100 mL/Hr) IV Continuous <Continuous>  dextrose 50% Injectable 25 Gram(s) IV Push once  dextrose 50% Injectable 12.5 Gram(s) IV Push once  dextrose 50% Injectable 25 Gram(s) IV Push once  glucagon  Injectable 1 milliGRAM(s) IntraMuscular once  insulin glargine Injectable (LANTUS) 17 Unit(s) SubCutaneous at bedtime  insulin lispro (ADMELOG) corrective regimen sliding scale   SubCutaneous three times a day before meals  insulin lispro (ADMELOG) corrective regimen sliding scale   SubCutaneous at bedtime  metoprolol succinate  milliGRAM(s) Oral daily  pantoprazole  Injectable 40 milliGRAM(s) IV Push every 12 hours  petrolatum white Ointment 1 Application(s) Topical two times a day  torsemide 20 milliGRAM(s) Oral every 12 hours    MEDICATIONS  (PRN):  acetaminophen     Tablet .. 650 milliGRAM(s) Oral every 6 hours PRN Temp greater or equal to 38C (100.4F), Mild Pain (1 - 3)  aluminum hydroxide/magnesium hydroxide/simethicone Suspension 30 milliLiter(s) Oral every 4 hours PRN Dyspepsia  dextrose Oral Gel 15 Gram(s) Oral once PRN Blood Glucose LESS THAN 70 milliGRAM(s)/deciliter  melatonin 3 milliGRAM(s) Oral at bedtime PRN Insomnia  ondansetron Injectable 4 milliGRAM(s) IV Push every 8 hours PRN Nausea and/or Vomiting      FAMILY HISTORY:  Family history of CVA  mom, age 57    Denies Family history of CAD or early MI    Constitutional: denies fever, chills  HEENT: denies blurry vision, difficulty hearing  Respiratory: denies SOB, CURTIS, cough  Cardiovascular: denies CP, palpitations, orthopnea, PND, LE edema  Gastrointestinal: denies nausea, vomiting, abdominal pain  Genitourinary: denies urinary changes  Skin: Denies rashes, itching  Neurologic: denies headache, weakness, dizziness  Hematology/Oncology: denies bleeding, easy bruising    SOCIAL HISTORY: No tobacco, Alcohol or Drug use    Vital Signs Last 24 Hrs  T(C): 36.3 (19 Apr 2022 04:51), Max: 36.9 (18 Apr 2022 20:27)  T(F): 97.4 (19 Apr 2022 04:51), Max: 98.4 (18 Apr 2022 20:27)  HR: 57 (19 Apr 2022 05:46) (57 - 79)  BP: 127/63 (19 Apr 2022 05:46) (100/50 - 131/56)  BP(mean): 81 (18 Apr 2022 23:04) (81 - 81)  RR: 19 (19 Apr 2022 04:51) (17 - 20)  SpO2: 95% (19 Apr 2022 04:51) (92% - 98%)    Physical Exam:  General: Well developed, well nourished, NAD  HEENT: NCAT, EOMI bl, moist mucous membranes   Neck: Supple, nontender, no mass  Neurology: A&Ox3, nonfocal, sensation intact   Respiratory: CTA B/L, No W/R/R  CV: RRR, +S1/S2, no murmurs, rubs or gallops  Abdominal: Soft, NT, ND +BSx4, no palpable masses  Extremities: No C/C/E, + peripheral pulses  MSK: Normal ROM, no joint erythema or warmth, no joint swelling   Heme: No obvious ecchymosis or petechiae   Skin: warm, dry, normal color    ECG:    I&O's Detail    18 Apr 2022 07:01  -  19 Apr 2022 07:00  --------------------------------------------------------  IN:    Oral Fluid: 260 mL  Total IN: 260 mL    OUT:  Total OUT: 0 mL    Total NET: 260 mL          LABS:                        12.0   8.81  )-----------( 208      ( 19 Apr 2022 07:12 )             37.3     04-19    133<L>  |  95<L>  |  38<H>  ----------------------------<  256<H>  3.8   |  31  |  1.50<H>    Ca    8.6      19 Apr 2022 07:12  Phos  3.5     04-19  Mg     2.1     04-19    TPro  6.9  /  Alb  2.8<L>  /  TBili  0.9  /  DBili  x   /  AST  18  /  ALT  21  /  AlkPhos  63  04-19    CARDIAC MARKERS ( 18 Apr 2022 06:22 )  x     / x     / 23 U/L / x     / <1.0 ng/mL      PT/INR - ( 17 Apr 2022 20:55 )   PT: 13.8 sec;   INR: 1.18 ratio         PTT - ( 17 Apr 2022 20:55 )  PTT:32.0 sec    I&O's Summary    18 Apr 2022 07:01  -  19 Apr 2022 07:00  --------------------------------------------------------  IN: 260 mL / OUT: 0 mL / NET: 260 mL      BNP  RADIOLOGY & ADDITIONAL STUDIES:   Patient is a 71y old  Female who presents with a chief complaint of vomiting, acute respiratory failure (18 Apr 2022 16:49)      HPI:  70 yo F PMHx HTN, DM2, LBBB, chronic lymphedema, HFrEF (EF 35-40%)  presents to ED with vomiting. Patient was in normal state of health until today. She went to [employer] dinner, was unable to eat due to nausea and suddenly started vomiting, non bloody, coffee colored emesis (states that it was not grainy or like coffee grounds). States she vomited >10 times. She had cereal for breakfast and tuna sandwich for lunch, no dinner. Denies any abdominal pain, lightheadedness fever, chest pain, dyspnea, leg pain. Admits to chills. She denies any chronic nsaid or steroid use. Denies etoh use. Abdominal surgical history significant for oophorectomy many years ago. No recent travels. She desaturated in the ED to 86% on RA (while sleeping). She was ordered for 500cc bolus but did not receive most of it due to history of CHF. Her O2 improved with oxygen supplementation and she was weaned off to room air.    Of note, Patient was recently admitted to [facility] 4/1-4/4/22 with CHF exacerbation, treated with IV diuretics and discharged to home on torsemide.    In the ED  VS:T 98, Hr 69, /84-->118/56, RR 20, SPO2 96-> 86% on RA  Labs: WBC 11.72, HH 13.5/42.2, PLTS 240, Na 136, K 3.9, Cr 1.20, Gluc 191, RVP negative, lipase negative  Chest Xray: trace left pleural effusion ( personal read)  CT A/P: no acute pathology  EKG: pending  Given in ED:  zofran 4mg IVP x1, protonix 40mg IVP x1 (17 Apr 2022 23:17)    Interval HPI:   Patient seen and examined at bedside. Patient laying comfortably and appears to be in NAD. Currently satting well on 2L should attempt to wean back to RA. All other vitals WNL. Labs this AM significant for hyponatremia likely 2/2 to decreased PO intake, Cr up trending to 1.50. This morning patient on regular diet and tolerating it well. Patient denies any CP, SOB, fever or chills.       PAST MEDICAL & SURGICAL HISTORY:  Hypertension    Diabetes    Lymphedema    H/O left bundle branch block    History of left bundle branch block (LBBB)    H/O cataract  2020    History of surgical removal of meniscus of knee  left in 1971    Frozen shoulder  2000    H/O Achilles tendon repair  lengthened bilaterally, 2000    Fractured skull  1968        ECHO FINDINGS:    MEDICATIONS  (STANDING):  atorvastatin 80 milliGRAM(s) Oral at bedtime  dextrose 5%. 1000 milliLiter(s) (50 mL/Hr) IV Continuous <Continuous>  dextrose 5%. 1000 milliLiter(s) (100 mL/Hr) IV Continuous <Continuous>  dextrose 50% Injectable 25 Gram(s) IV Push once  dextrose 50% Injectable 12.5 Gram(s) IV Push once  dextrose 50% Injectable 25 Gram(s) IV Push once  glucagon  Injectable 1 milliGRAM(s) IntraMuscular once  insulin glargine Injectable (LANTUS) 17 Unit(s) SubCutaneous at bedtime  insulin lispro (ADMELOG) corrective regimen sliding scale   SubCutaneous three times a day before meals  insulin lispro (ADMELOG) corrective regimen sliding scale   SubCutaneous at bedtime  metoprolol succinate  milliGRAM(s) Oral daily  pantoprazole  Injectable 40 milliGRAM(s) IV Push every 12 hours  petrolatum white Ointment 1 Application(s) Topical two times a day  torsemide 20 milliGRAM(s) Oral every 12 hours    MEDICATIONS  (PRN):  acetaminophen     Tablet .. 650 milliGRAM(s) Oral every 6 hours PRN Temp greater or equal to 38C (100.4F), Mild Pain (1 - 3)  aluminum hydroxide/magnesium hydroxide/simethicone Suspension 30 milliLiter(s) Oral every 4 hours PRN Dyspepsia  dextrose Oral Gel 15 Gram(s) Oral once PRN Blood Glucose LESS THAN 70 milliGRAM(s)/deciliter  melatonin 3 milliGRAM(s) Oral at bedtime PRN Insomnia  ondansetron Injectable 4 milliGRAM(s) IV Push every 8 hours PRN Nausea and/or Vomiting      FAMILY HISTORY:  Family history of CVA  mom, age 57    Denies Family history of CAD or early MI    Constitutional: denies fever, chills  HEENT: denies blurry vision, difficulty hearing  Respiratory: denies SOB, CURTIS, cough  Cardiovascular: denies CP, palpitations, orthopnea, PND, LE edema  Gastrointestinal: denies nausea, vomiting, abdominal pain  Genitourinary: denies urinary changes  Skin: Denies rashes, itching  Neurologic: denies headache, weakness, dizziness  Hematology/Oncology: denies bleeding, easy bruising    SOCIAL HISTORY: No tobacco, Alcohol or Drug use    Vital Signs Last 24 Hrs  T(C): 36.3 (19 Apr 2022 04:51), Max: 36.9 (18 Apr 2022 20:27)  T(F): 97.4 (19 Apr 2022 04:51), Max: 98.4 (18 Apr 2022 20:27)  HR: 57 (19 Apr 2022 05:46) (57 - 79)  BP: 127/63 (19 Apr 2022 05:46) (100/50 - 131/56)  BP(mean): 81 (18 Apr 2022 23:04) (81 - 81)  RR: 19 (19 Apr 2022 04:51) (17 - 20)  SpO2: 95% (19 Apr 2022 04:51) (92% - 98%)    Physical Exam:  General: Well developed, well nourished, NAD  HEENT: NCAT, EOMI bl, moist mucous membranes   Neck: Supple, nontender, no mass  Neurology: A&Ox3, nonfocal, sensation intact   Respiratory: CTA B/L, No W/R/R  CV: RRR, +S1/S2, no murmurs, rubs or gallops  Abdominal: Soft, NT, ND +BSx4, no palpable masses  Extremities: edematous and erythematous LE b/l. Non-tender on palpation. + peripheral pulses  MSK: Normal ROM, no joint erythema or warmth, no joint swelling   Heme: No obvious ecchymosis or petechiae   Skin: warm, dry, normal color    ECG:    I&O's Detail    18 Apr 2022 07:01  -  19 Apr 2022 07:00  --------------------------------------------------------  IN:    Oral Fluid: 260 mL  Total IN: 260 mL    OUT:  Total OUT: 0 mL    Total NET: 260 mL          LABS:                        12.0   8.81  )-----------( 208      ( 19 Apr 2022 07:12 )             37.3     04-19    133<L>  |  95<L>  |  38<H>  ----------------------------<  256<H>  3.8   |  31  |  1.50<H>    Ca    8.6      19 Apr 2022 07:12  Phos  3.5     04-19  Mg     2.1     04-19    TPro  6.9  /  Alb  2.8<L>  /  TBili  0.9  /  DBili  x   /  AST  18  /  ALT  21  /  AlkPhos  63  04-19    CARDIAC MARKERS ( 18 Apr 2022 06:22 )  x     / x     / 23 U/L / x     / <1.0 ng/mL      PT/INR - ( 17 Apr 2022 20:55 )   PT: 13.8 sec;   INR: 1.18 ratio         PTT - ( 17 Apr 2022 20:55 )  PTT:32.0 sec    I&O's Summary    18 Apr 2022 07:01  -  19 Apr 2022 07:00  --------------------------------------------------------  IN: 260 mL / OUT: 0 mL / NET: 260 mL      BNP  RADIOLOGY & ADDITIONAL STUDIES:

## 2022-04-19 NOTE — PROGRESS NOTE ADULT - PROBLEM SELECTOR PLAN 7
- chronic, stable, continue home atorvastatin 80mg qd - /63 this AM  - HOLD enalapril 10mg in setting of DUNCAN  - continue home metoprolol succinate 100mg qd  - given HFrEF, would resume as soon as possible if Cr is stable  - monitor hemodynamics

## 2022-04-19 NOTE — PROGRESS NOTE ADULT - PROBLEM SELECTOR PLAN 5
HFrEF  - TTE 4/4/22 EF 35-40%   - HOLD torsemide 20mg PO qd in setting of DUNCAN   - continue home metoprolol succinate 100mg qd  - resume ACE inhibitor if BP is elevated and/or Cr is stable  - outpatient cards eval to consider replace ACE-I with ARNI (would require 36hrs off ACE-inhibitor)  - overnight, patient with 1.6s pause, 12 beats vtach, asymptomatic, AM mag, phos WNL  - cardiology following, Dr Zimmer, recs appreciated Chronic, stable per patient  - hx of recent tx for LE cellulitis  - legs improved at this time per pt   - continue diuresis as above, continue to monitor, outpatient f/u upon d/c

## 2022-04-19 NOTE — DISCHARGE NOTE PROVIDER - ATTENDING DISCHARGE PHYSICAL EXAMINATION:
GENERAL: NAD, +morbidly obese   HEENT:  anicteric, moist mucous membranes  CHEST/LUNG: decreased breath sounds throughout but CTA  HEART:  RRR, S1, S2  ABDOMEN:  BS+, soft, nontender, nondistended  EXTREMITIES: b/l LE edema and erythema 2/2 lymphedema (chronic per pt). RLE worse edema compared with LLE. LLE worse erythema up to below the knee on L, when compared with RLE.  NERVOUS SYSTEM: answers questions and follows commands appropriately

## 2022-04-19 NOTE — DISCHARGE NOTE PROVIDER - NPI NUMBER (FOR SYS[employer] USE ONLY) :
[[id number] [[id number]],[[id number]],[[id number]] [[id number]],[[id number]]

## 2022-04-19 NOTE — PROGRESS NOTE ADULT - PROBLEM SELECTOR PLAN 9
- DVT ppx with SCDs for now. Start LMWH ppx if FOBT is negative. - DVT ppx with SCDs for now. Start LMWH ppx if FOBT is negative.      #dispo planning  - PT evaluated, likely home with no skilled PT needs - improved, WBC 8.81 this AM   - was likely reactive  - daily CBCs

## 2022-04-19 NOTE — PHYSICAL THERAPY INITIAL EVALUATION ADULT - PERTINENT HX OF CURRENT PROBLEM, REHAB EVAL
70 y/o female with PMHx HTN and CHF presents with vomiting. pt reports acute onset of vomit around 5pm. pt reports coffee colored vomit. pt admits to ASA use. pt reports recent admission for CHF. pt denies fever, abd pain, diarrhea, chest pain, SOB, or any other complaints.

## 2022-04-19 NOTE — DISCHARGE NOTE PROVIDER - PROVIDER TOKENS
PROVIDER:[TOKEN:[[id number]],FOLLOWUP:[1 week],ESTABLISHEDPATIENT:[T]],PROVIDER:[TOKEN:[[id number]],FOLLOWUP:[1 week]] PROVIDER:[TOKEN:[[id number]],FOLLOWUP:[1 week],ESTABLISHEDPATIENT:[T]],PROVIDER:[TOKEN:[[id number]],FOLLOWUP:[1 week]],PROVIDER:[TOKEN:[[id number]],FOLLOWUP:[1 week]] PROVIDER:[TOKEN:[[id number]],FOLLOWUP:[1 week],ESTABLISHEDPATIENT:[T]],PROVIDER:[TOKEN:[[id number]],FOLLOWUP:[1 week]],PROVIDER:[TOKEN:[[id number]]]

## 2022-04-19 NOTE — CONSULT NOTE ADULT - ASSESSMENT
70 yo F PMHx HTN, DM2, LBBB, chronic lymphedema, HFrEF (EF 35-40%) presents to ED with intractable coffee colored emesis. Admitted for vomiting, r/o UGIB. Also with acute hypoxic respiratory failure while in the ED.    #HFrEF   - Most recent Echo showed 4/4/22 EF 35-40%, recently diagnosed systolic CHF   - Hold home Toresmide 20mg BID in setting of DUNCAN   - Hold home Enalapril 20mg OD in setting of DUNCAN   - Continue home Metoprolol 50mg TID   - Monitor Volume Status   - No evidence of fluid overload at this point   - Avoid IVF   - Strict I/Os, and Daily Weights     #HTN   - BP acceptable   - Continue Home Metroprolol 50mg   - And continue to hold Ace and Diuretic due to DUNCAN, re-start when DUNCAN resolves     #EKG   - EKG on admission showed -----   - Monitor and replete lytes, keep K>4, Mg>2.     - Other cardiovascular workup will depend on clinical course. All other workup per primary team.  - Will follow. 72 yo F PMHx HTN, DM2, LBBB, chronic lymphedema, HFrEF (EF 35-40%) presents to ED with intractable coffee colored emesis. Admitted for vomiting, r/o UGIB. Also with acute hypoxic respiratory failure while in the ED.    #HFrEF   - Most recent Echo showed 4/4/22 EF 35-40%, recently diagnosed systolic CHF   - Hold home Toresmide 20mg BID in setting of DUNCAN   - Hold home Enalapril 20mg OD in setting of DUNCAN   - Continue home Metoprolol 100mg OD   - Monitor Volume Status   - No evidence of fluid overload at this point   - Avoid IVF   - Strict I/Os, and Daily Weights   - Monitor and replete lytes, keep K>4, Mg>2.    #HTN   - BP acceptable   - Continue Home Metroprolol 50mg   - And continue to hold Ace and Diuretic due to DUNCAN, re-start when DUNCAN resolves        - Other cardiovascular workup will depend on clinical course. All other workup per primary team.  - Will follow. 72 yo F PMHx HTN, DM2, LBBB, chronic lymphedema, HFrEF (EF 35-40%) presents to ED with intractable coffee colored emesis. Admitted for vomiting, r/o UGIB. Also with acute hypoxic respiratory failure while in the ED.    #HFrEF   - Most recent Echo showed 4/4/22 EF 35-40%, recently diagnosed systolic CHF   - Hold home Toresmide 20mg BID in setting of DUNCAN   - Hold home Enalapril 20mg OD in setting of DUNCAN   - Continue home Metoprolol 100mg OD   - Monitor Volume Status   - No evidence of fluid overload at this point or acute decompensated HF   - Avoid IVF   - Strict I/Os, and Daily Weights     #HTN   - BP acceptable   - Continue Home Metroprolol 50mg   - And continue to hold Ace and Diuretic due to DUNCAN, re-start when DUNCAN resolves     #EKG  - unchanged from prior   - Monitor and replete lytes, keep K>4, Mg>2.       - Other cardiovascular workup will depend on clinical course. All other workup per primary team.  - Will follow. 70 yo F PMHx HTN, DM2, LBBB, chronic lymphedema, HFrEF (EF 35-40%) presents to ED with intractable coffee colored emesis. Admitted for vomiting, r/o UGIB. Also with acute hypoxic respiratory failure while in the ED.    #HFrEF   - Most recent Echo showed 4/4/22 EF 35-40%, recently diagnosed systolic CHF   - Hold home Toresmide 20mg BID in setting of DUNCAN   - Hold home Enalapril 20mg OD in setting of DUNCAN   - Continue home Metoprolol 100mg OD   - Monitor Volume Status   - No evidence of fluid overload at this point or acute decompensated HF   - Avoid IVF   - Strict I/Os, and Daily Weights     #HTN   - BP acceptable   - Continue Home Metroprolol 100mg   - And continue to hold Ace and Diuretic due to DUNCAN, re-start when DUNCAN resolves     #EKG  - unchanged from prior   - Monitor and replete lytes, keep K>4, Mg>2.       - Other cardiovascular workup will depend on clinical course. All other workup per primary team.  - Will follow.

## 2022-04-19 NOTE — DISCHARGE NOTE PROVIDER - NSDCFUADDAPPT_GEN_ALL_CORE_FT
Please follow up with a Pulmonology to be evaluated for obstructive sleep apnea.  Please follow up with a Pulmonology to be evaluated for obstructive sleep apnea.   Follow up with Gasteroenterologist Dr. Luna within 1 week of discharge.  Please follow up with a Pulmonology to be evaluated for obstructive sleep apnea.   Follow up with Gastroenterologists Dr. Luna within 1 week of discharge.

## 2022-04-19 NOTE — DISCHARGE NOTE PROVIDER - CARE PROVIDERS DIRECT ADDRESSES
,DirectAddress_Unknown,DirectAddress_Unknown ,DirectAddress_Unknown,DirectAddress_Unknown,DirectAddress_Unknown ,DirectAddress_Unknown,[email or web address]

## 2022-04-19 NOTE — PROGRESS NOTE ADULT - PROBLEM SELECTOR PLAN 2
- patient desaturated on room air to 86% while in ED, ?LELIA as pt was asleep   - SpO2 > 90% on 2L n/c, will attempt to wean to room air today   - continue torsemide 20mg po daily   - does not appear to be volume overload on exam today   - pulmonary consulted, dr sandoval, for likely LELIA

## 2022-04-19 NOTE — PROGRESS NOTE ADULT - ASSESSMENT
70 yo F PMHx HTN, DM2, LBBB, chronic lymphedema, HFrEF (EF 35-40%) presents to ED with intractable coffee colored emesis. Admitted for vomiting, r/o UGIB. Also with acute hypoxic respiratory failure while in the ED.

## 2022-04-19 NOTE — PROGRESS NOTE ADULT - PROBLEM SELECTOR PLAN 8
- improved, WBC 8.81 this AM   - was likely reactive  - daily CBCs - chronic, stable, continue home atorvastatin 80mg qd

## 2022-04-20 ENCOUNTER — TRANSCRIPTION ENCOUNTER (OUTPATIENT)
Age: 72
End: 2022-04-20

## 2022-04-20 VITALS
HEART RATE: 55 BPM | RESPIRATION RATE: 17 BRPM | DIASTOLIC BLOOD PRESSURE: 52 MMHG | SYSTOLIC BLOOD PRESSURE: 122 MMHG | TEMPERATURE: 98 F | OXYGEN SATURATION: 92 %

## 2022-04-20 LAB
ANION GAP SERPL CALC-SCNC: 6 MMOL/L — SIGNIFICANT CHANGE UP (ref 5–17)
BASOPHILS # BLD AUTO: 0.1 K/UL — SIGNIFICANT CHANGE UP (ref 0–0.2)
BASOPHILS NFR BLD AUTO: 1.4 % — SIGNIFICANT CHANGE UP (ref 0–2)
BUN SERPL-MCNC: 40 MG/DL — HIGH (ref 7–23)
CALCIUM SERPL-MCNC: 8.9 MG/DL — SIGNIFICANT CHANGE UP (ref 8.5–10.1)
CHLORIDE SERPL-SCNC: 98 MMOL/L — SIGNIFICANT CHANGE UP (ref 96–108)
CO2 SERPL-SCNC: 31 MMOL/L — SIGNIFICANT CHANGE UP (ref 22–31)
CREAT SERPL-MCNC: 1.3 MG/DL — SIGNIFICANT CHANGE UP (ref 0.5–1.3)
EGFR: 44 ML/MIN/1.73M2 — LOW
EOSINOPHIL # BLD AUTO: 0.36 K/UL — SIGNIFICANT CHANGE UP (ref 0–0.5)
EOSINOPHIL NFR BLD AUTO: 5 % — SIGNIFICANT CHANGE UP (ref 0–6)
GLUCOSE SERPL-MCNC: 131 MG/DL — HIGH (ref 70–99)
HCT VFR BLD CALC: 39 % — SIGNIFICANT CHANGE UP (ref 34.5–45)
HGB BLD-MCNC: 12.7 G/DL — SIGNIFICANT CHANGE UP (ref 11.5–15.5)
IMM GRANULOCYTES NFR BLD AUTO: 0.6 % — SIGNIFICANT CHANGE UP (ref 0–1.5)
LYMPHOCYTES # BLD AUTO: 1.07 K/UL — SIGNIFICANT CHANGE UP (ref 1–3.3)
LYMPHOCYTES # BLD AUTO: 14.7 % — SIGNIFICANT CHANGE UP (ref 13–44)
MCHC RBC-ENTMCNC: 28.2 PG — SIGNIFICANT CHANGE UP (ref 27–34)
MCHC RBC-ENTMCNC: 32.6 GM/DL — SIGNIFICANT CHANGE UP (ref 32–36)
MCV RBC AUTO: 86.5 FL — SIGNIFICANT CHANGE UP (ref 80–100)
MONOCYTES # BLD AUTO: 0.87 K/UL — SIGNIFICANT CHANGE UP (ref 0–0.9)
MONOCYTES NFR BLD AUTO: 12 % — SIGNIFICANT CHANGE UP (ref 2–14)
NEUTROPHILS # BLD AUTO: 4.82 K/UL — SIGNIFICANT CHANGE UP (ref 1.8–7.4)
NEUTROPHILS NFR BLD AUTO: 66.3 % — SIGNIFICANT CHANGE UP (ref 43–77)
NRBC # BLD: 0 /100 WBCS — SIGNIFICANT CHANGE UP (ref 0–0)
PLATELET # BLD AUTO: 209 K/UL — SIGNIFICANT CHANGE UP (ref 150–400)
POTASSIUM SERPL-MCNC: 3.6 MMOL/L — SIGNIFICANT CHANGE UP (ref 3.5–5.3)
POTASSIUM SERPL-SCNC: 3.6 MMOL/L — SIGNIFICANT CHANGE UP (ref 3.5–5.3)
RBC # BLD: 4.51 M/UL — SIGNIFICANT CHANGE UP (ref 3.8–5.2)
RBC # FLD: 15.1 % — HIGH (ref 10.3–14.5)
SODIUM SERPL-SCNC: 135 MMOL/L — SIGNIFICANT CHANGE UP (ref 135–145)
WBC # BLD: 7.26 K/UL — SIGNIFICANT CHANGE UP (ref 3.8–10.5)
WBC # FLD AUTO: 7.26 K/UL — SIGNIFICANT CHANGE UP (ref 3.8–10.5)

## 2022-04-20 PROCEDURE — 99285 EMERGENCY DEPT VISIT HI MDM: CPT

## 2022-04-20 PROCEDURE — 82550 ASSAY OF CK (CPK): CPT

## 2022-04-20 PROCEDURE — 74176 CT ABD & PELVIS W/O CONTRAST: CPT | Mod: MA

## 2022-04-20 PROCEDURE — 99232 SBSQ HOSP IP/OBS MODERATE 35: CPT

## 2022-04-20 PROCEDURE — 85610 PROTHROMBIN TIME: CPT

## 2022-04-20 PROCEDURE — 83690 ASSAY OF LIPASE: CPT

## 2022-04-20 PROCEDURE — 36415 COLL VENOUS BLD VENIPUNCTURE: CPT

## 2022-04-20 PROCEDURE — 80053 COMPREHEN METABOLIC PANEL: CPT

## 2022-04-20 PROCEDURE — 96374 THER/PROPH/DIAG INJ IV PUSH: CPT

## 2022-04-20 PROCEDURE — 93005 ELECTROCARDIOGRAM TRACING: CPT

## 2022-04-20 PROCEDURE — 82962 GLUCOSE BLOOD TEST: CPT

## 2022-04-20 PROCEDURE — 85025 COMPLETE CBC W/AUTO DIFF WBC: CPT

## 2022-04-20 PROCEDURE — 96375 TX/PRO/DX INJ NEW DRUG ADDON: CPT

## 2022-04-20 PROCEDURE — 97162 PT EVAL MOD COMPLEX 30 MIN: CPT

## 2022-04-20 PROCEDURE — 84484 ASSAY OF TROPONIN QUANT: CPT

## 2022-04-20 PROCEDURE — 83735 ASSAY OF MAGNESIUM: CPT

## 2022-04-20 PROCEDURE — 0225U NFCT DS DNA&RNA 21 SARSCOV2: CPT

## 2022-04-20 PROCEDURE — 86850 RBC ANTIBODY SCREEN: CPT

## 2022-04-20 PROCEDURE — 99239 HOSP IP/OBS DSCHRG MGMT >30: CPT

## 2022-04-20 PROCEDURE — 71045 X-RAY EXAM CHEST 1 VIEW: CPT

## 2022-04-20 PROCEDURE — 86901 BLOOD TYPING SEROLOGIC RH(D): CPT

## 2022-04-20 PROCEDURE — 85730 THROMBOPLASTIN TIME PARTIAL: CPT

## 2022-04-20 PROCEDURE — 82553 CREATINE MB FRACTION: CPT

## 2022-04-20 PROCEDURE — 85027 COMPLETE CBC AUTOMATED: CPT

## 2022-04-20 PROCEDURE — 84100 ASSAY OF PHOSPHORUS: CPT

## 2022-04-20 PROCEDURE — 86900 BLOOD TYPING SEROLOGIC ABO: CPT

## 2022-04-20 PROCEDURE — 80048 BASIC METABOLIC PNL TOTAL CA: CPT

## 2022-04-20 RX ORDER — PANTOPRAZOLE SODIUM 20 MG/1
40 TABLET, DELAYED RELEASE ORAL
Qty: 0 | Refills: 0 | DISCHARGE
Start: 2022-04-20

## 2022-04-20 RX ORDER — PANTOPRAZOLE SODIUM 20 MG/1
1 TABLET, DELAYED RELEASE ORAL
Qty: 28 | Refills: 0
Start: 2022-04-20 | End: 2022-05-03

## 2022-04-20 RX ADMIN — Medication 4: at 12:16

## 2022-04-20 RX ADMIN — Medication 100 MILLIGRAM(S): at 06:15

## 2022-04-20 RX ADMIN — Medication 2: at 08:07

## 2022-04-20 RX ADMIN — PANTOPRAZOLE SODIUM 40 MILLIGRAM(S): 20 TABLET, DELAYED RELEASE ORAL at 06:15

## 2022-04-20 RX ADMIN — Medication 1 APPLICATION(S): at 06:15

## 2022-04-20 NOTE — PROGRESS NOTE ADULT - ASSESSMENT
Vomiting  History HFrEF (EF 35-40%)  Acute respiratory failure    CT noted, d/w patient  No further vomiting  Anti-emetics prn  Diet as tolerated  Hgb stable, monitor cbc  PPI for prophylaxis  F/u FOBT  Will need outpatient f/u with GI  Will follow    I reviewed the overnight course of events on the unit, re-confirming the patient history. I discussed the care with the patient and their family  Differential diagnosis and plan of care discussed with patient after the evaluation  35 minutes spent on total encounter of which more than fifty percent of the encounter was spent counseling and/or coordinating care by the attending physician.  Advanced care planning was discussed with patient and family.  Advanced care planning forms were reviewed and discussed.  Risks, benefits and alternatives of gastroenterologic procedures were discussed in detail and all questions were answered.

## 2022-04-20 NOTE — PROGRESS NOTE ADULT - NS ATTEND AMEND GEN_ALL_CORE FT
Volume status appropriate, and remains off diuretics, in the setting of ted  creatinine with favorable trend overall and can restart diuretics at time of dc

## 2022-04-20 NOTE — DISCHARGE NOTE NURSING/CASE MANAGEMENT/SOCIAL WORK - PATIENT PORTAL LINK FT
You can access the FollowMyHealth Patient Portal offered by [facility] by registering at the following website: [email or web address]. By joining [facility]’s FollowMyHealth portal, you will also be able to view your health information using other applications (apps) compatible with our system.

## 2022-04-20 NOTE — PROGRESS NOTE ADULT - REASON FOR ADMISSION
vomiting, acute respiratory failure

## 2022-04-20 NOTE — PROGRESS NOTE ADULT - ASSESSMENT
72 yo F PMHx HTN, DM2, LBBB, chronic lymphedema, HFrEF (EF 35-40%) presents to ED with intractable coffee colored emesis. Admitted for vomiting, r/o UGIB. Also with acute hypoxic respiratory failure while in the ED.    HFrEF/ HTN/    - Most recent Echo showed 4/4/22 EF 35-40%, recently diagnosed systolic CHF   - Hold home Toresmide 20mg BID in setting of DUNCAN   - Hold home Enalapril 20mg OD in setting of DUNCAN   -Euvolemic on exam, Follow up am renal profile pending   - Continue home Metoprolol 100mg OD   - Avoid IVF   Dc planning fu GI recs   Nikki Daniels FNP-C  Cardiology NP  [facility] [address] [phone]   72 yo F PMHx HTN, DM2, LBBB, chronic lymphedema, HFrEF (EF 35-40%) presents to ED with intractable coffee colored emesis. Admitted for vomiting, r/o UGIB. Also with acute hypoxic respiratory failure while in the ED.    HFrEF/ HTN/    - Most recent Echo showed 4/4/22 EF 35-40%, recently diagnosed systolic CHF   - Hold home Toresmide 20mg BID in setting of DUNCAN   - Hold home Enalapril 20mg OD in setting of DUNCAN   - Euvolemic on exam, Follow up am renal profile pending   - Continue home Metoprolol 100mg OD   - Avoid IVF   Dc planning fu GI recs   Nikki Daniels FNP-C  Cardiology NP  [facility] [phone]

## 2022-04-20 NOTE — PROGRESS NOTE ADULT - SUBJECTIVE AND OBJECTIVE BOX
[facility] -- Shyanne Mueller, Goran, Howard, Carlos Luong Savella  Office # [phone]      Follow Up:  lbbn htn     Subjective/Observations:   No events overnight resting comfortably in bed.  No complaints of chest pain, dyspnea, or palpitations reported. No signs of orthopnea or PND.  no further episodes of vomiting       REVIEW OF SYSTEMS: All other review of systems is negative unless indicated above    PAST MEDICAL & SURGICAL HISTORY:  Hypertension    Diabetes    Lymphedema    H/O left bundle branch block    History of left bundle branch block (LBBB)    Systolic heart failure, chronic    H/O cataract  2020    History of surgical removal of meniscus of knee  left in 1971    Frozen shoulder  2000    H/O Achilles tendon repair  lengthened bilaterally, 2000    Fractured skull  1968        MEDICATIONS  (STANDING):  atorvastatin 80 milliGRAM(s) Oral at bedtime  dextrose 5%. 1000 milliLiter(s) (50 mL/Hr) IV Continuous <Continuous>  dextrose 5%. 1000 milliLiter(s) (100 mL/Hr) IV Continuous <Continuous>  dextrose 50% Injectable 25 Gram(s) IV Push once  dextrose 50% Injectable 12.5 Gram(s) IV Push once  dextrose 50% Injectable 25 Gram(s) IV Push once  glucagon  Injectable 1 milliGRAM(s) IntraMuscular once  insulin glargine Injectable (LANTUS) 25 Unit(s) SubCutaneous at bedtime  insulin lispro (ADMELOG) corrective regimen sliding scale   SubCutaneous three times a day before meals  insulin lispro (ADMELOG) corrective regimen sliding scale   SubCutaneous at bedtime  metoprolol succinate  milliGRAM(s) Oral daily  pantoprazole  Injectable 40 milliGRAM(s) IV Push every 12 hours  petrolatum white Ointment 1 Application(s) Topical two times a day    MEDICATIONS  (PRN):  acetaminophen     Tablet .. 650 milliGRAM(s) Oral every 6 hours PRN Temp greater or equal to 38C (100.4F), Mild Pain (1 - 3)  aluminum hydroxide/magnesium hydroxide/simethicone Suspension 30 milliLiter(s) Oral every 4 hours PRN Dyspepsia  dextrose Oral Gel 15 Gram(s) Oral once PRN Blood Glucose LESS THAN 70 milliGRAM(s)/deciliter  melatonin 3 milliGRAM(s) Oral at bedtime PRN Insomnia      Allergies    penicillins (Hives)    Intolerances        Vital Signs Last 24 Hrs  T(C): 36.4 (20 Apr 2022 04:49), Max: 36.8 (19 Apr 2022 19:24)  T(F): 97.5 (20 Apr 2022 04:49), Max: 98.2 (19 Apr 2022 19:24)  HR: 60 (20 Apr 2022 04:49) (56 - 60)  BP: 115/69 (20 Apr 2022 04:49) (108/62 - 116/63)  BP(mean): --  RR: 18 (20 Apr 2022 04:49) (18 - 19)  SpO2: 97% (20 Apr 2022 04:49) (91% - 97%)    I&O's Summary    19 Apr 2022 07:01  -  20 Apr 2022 07:00  --------------------------------------------------------  IN: 0 mL / OUT: 1 mL / NET: -1 mL          PHYSICAL EXAM:  TELE: Sr LBBB   Constitutional: NAD, awake and alert, obese  HEENT: Moist Mucous Membranes, Anicteric  Pulmonary: Non-labored, breath sounds are clear bilaterally, No wheezing, crackles or rhonchi  Cardiovascular: Regular, S1 and S2 nl, No murmurs, rubs, gallops or clicks  Gastrointestinal: Bowel Sounds present, soft, nontender.   Lymph: No lymphadenopathy. No peripheral edema.  Skin: No visible rashes or ulcers.  Psych:  Mood & affect appropriate    LABS: All Labs Reviewed:                        12.7   7.26  )-----------( 209      ( 20 Apr 2022 08:19 )             39.0                         12.0   8.81  )-----------( 208      ( 19 Apr 2022 07:12 )             37.3                         13.1   10.51 )-----------( 211      ( 18 Apr 2022 06:22 )             40.4     19 Apr 2022 07:12    133    |  95     |  38     ----------------------------<  256    3.8     |  31     |  1.50   18 Apr 2022 06:22    140    |  101    |  29     ----------------------------<  225    4.0     |  33     |  1.10   17 Apr 2022 20:55    136    |  96     |  25     ----------------------------<  191    3.9     |  34     |  1.20     Ca    8.6        19 Apr 2022 07:12  Ca    8.7        18 Apr 2022 06:22  Ca    9.0        17 Apr 2022 20:55  Phos  3.5       19 Apr 2022 07:12  Mg     2.1       19 Apr 2022 07:12    TPro  6.9    /  Alb  2.8    /  TBili  0.9    /  DBili  x      /  AST  18     /  ALT  21     /  AlkPhos  63     19 Apr 2022 07:12  TPro  7.1    /  Alb  2.9    /  TBili  0.9    /  DBili  x      /  AST  22     /  ALT  21     /  AlkPhos  64     18 Apr 2022 06:22  TPro  8.2    /  Alb  3.4    /  TBili  0.9    /  DBili  x      /  AST  30     /  ALT  27     /  AlkPhos  76     17 Apr 2022 20:55             < from: 12 Lead ECG (04.18.22 @ 02:00) >  Ventricular Rate 63 BPM    Atrial Rate 63 BPM    P-R Interval 276 ms    QRS Duration 162 ms    Q-T Interval 514 ms    QTC Calculation(Bazett) 525 ms    P Axis 49 degrees    R Axis -73 degrees    T Axis 120 degrees    Diagnosis Line sinus rhythm with first degree av block  Left bundle branch block  Abnormal ECG  No previous ECGs available    < from: TTE Echo Complete w/ Contrast w/ Doppler (04.04.22 @ 10:00) >  CHO TTE WITH CON COMP W DOPP                          PROCEDURE DATE:  04/04/2022          INTERPRETATION:  INDICATION: Abnormal EKG  Sonographer AS    Blood Pressure 161/57    Height 180.3 cm     Weight 158.8 kg       BSA   2.8 sq m    Dimensions:  LA 4.2       Normal Values: 2.0 - 4.0 cm  Ao 3.3        Normal Values: 2.0 - 3.8 cm  SEPTUM 1.2       Normal Values: 0.6 - 1.2 cm  PWT 1.3       Normal Values: 0.6 - 1.1 cm  LVIDd 4.9         Normal Values: 3.0 - 5.6 cm  LVIDs 4.1         Normal Values: 1.8 - 4.0 cm      OBSERVATIONS:  Technically difficult and very limited study  Mitral Valve: Mitral annular calcification, trace physiologic MR.  Aortic Valve/Aorta: Aortic valve is not well-visualized. In limited views   the valve appears calcified with decreased opening. Peak transaortic   valve gradient is 26 mmHg with a mean transaortic valve gradient 15.4   mmHg.  Tricuspid Valve: Not well-visualized  Pulmonic Valve: Not well-visualized  Left Atrium: normal  Right Atrium: Not well-visualized  Left Ventricle: The left ventricular endocardium is not well-visualized.   Decreased global left ventricular systolic dysfunction with an LVEF of   35-40%. Endocardium visualization enhanced with intravenous injection of   ultrasonic enhancing agent (Definity)  Right Ventricle: Not well-visualized  Pericardium: no significant pericardial effusion.  Pulmonary/RV Pressure: estimated PA systolic pressure of 45 mmHg assuming   an RA pressure of 10 mmHg.  LV diastolic dysfunction is present        IMPRESSION:  Technically difficult and very limited study  The left ventricular endocardium is not well-visualized. Decreased global   left ventricular systolic dysfunction with an LVEF of 35-40%. Endocardium   visualization enhanced with intravenous injection of ultrasonic enhancing   agent (Definity)  The right ventricle is not well-visualized  The aortic valve is not well-visualized. In limited views the valve   appears calcified with decreased opening. Peak transaortic valve gradient   is 26 mmHg with a mean transaortic valve gradient 15.4 mmHg.  Trace physiologic MR  No significant pericardial effusion.              
CC: Nausea vomiting is resolved.    HPI:  70 yo F PMHx HTN, DM2, LBBB, chronic lymphedema, HFrEF (EF 35-40%)  presents to ED with vomiting. Patient was in normal state of health until today. She went to kooabaer dinner, was unable to eat due to nausea and suddenly started vomiting, non bloody, coffee colored emesis (states that it was not grainy or like coffee grounds). States she vomited >10 times. She had cereal for breakfast and tuna sandwich for lunch, no dinner. Denies any abdominal pain, lightheadedness fever, chest pain, dyspnea, leg pain. Admits to chills. She denies any chronic nsaid or steroid use. Denies etoh use. Abdominal surgical history significant for oophorectomy many years ago. No recent travels. She desaturated in the ED to 86% on RA (while sleeping). She was ordered for 500cc bolus but did not receive most of it due to history of CHF. Her O2 improved with oxygen supplementation and she was weaned off to room air.    Of note, Patient was recently admitted to [facility] 4/1-4/4/22 with CHF exacerbation, treated with IV diuretics and discharged to home on torsemide.    In the ED  VS:T 98, Hr 69, /84-->118/56, RR 20, SPO2 96-> 86% on RA  Labs: WBC 11.72, HH 13.5/42.2, PLTS 240, Na 136, K 3.9, Cr 1.20, Gluc 191, RVP negative, lipase negative  Chest Xray: trace left pleural effusion ( personal read)  CT A/P: no acute pathology  EKG: pending  Given in ED:  zofran 4mg IVP x1, protonix 40mg IVP x1 (17 Apr 2022 23:17)    REVIEW OF SYSTEMS:    Patient denied fever, chills, abdominal pain, nausea, vomiting, cough, shortness of breath, chest pain or palpitations    Vital Signs Last 24 Hrs  T(C): 36.2 (18 Apr 2022 14:21), Max: 36.8 (18 Apr 2022 08:50)  T(F): 97.1 (18 Apr 2022 14:21), Max: 98.2 (18 Apr 2022 08:50)  HR: 61 (18 Apr 2022 14:21) (60 - 77)  BP: 100/50 (18 Apr 2022 14:21) (100/50 - 153/84)  BP(mean): --  RR: 18 (18 Apr 2022 14:21) (17 - 20)  SpO2: 98% (18 Apr 2022 14:21) (86% - 98%)I&O's Summary    PHYSICAL EXAM:  GENERAL: NAD, Extreme obese   HEENT: PERRL, +EOMI, anicteric, no [place]  NECK: Supple, No JVD   CHEST/LUNG: CTA bilaterally; Normal effort  HEART: S1S2 Normal intensity, no murmurs, gallops or rubs noted  ABDOMEN: Soft, BS Normoactive, NT, ND, no HSM noted  EXTREMITIES:  no cyanosis, Bernardo none pitting  edema noted, FROM x 4  SKIN: No rashes or lesions noted  NEURO: A&Ox3, no focal deficits noted, CN II-XII intact  PSYCH: normal mood and affect; insight/judgement appropriate  LABS:                        13.1   10.51 )-----------( 211      ( 18 Apr 2022 06:22 )             40.4     04-18    140  |  101  |  29<H>  ----------------------------<  225<H>  4.0   |  33<H>  |  1.10    Ca    8.7      18 Apr 2022 06:22    TPro  7.1  /  Alb  2.9<L>  /  TBili  0.9  /  DBili  x   /  AST  22  /  ALT  21  /  AlkPhos  64  04-18    PT/INR - ( 17 Apr 2022 20:55 )   PT: 13.8 sec;   INR: 1.18 ratio         PTT - ( 17 Apr 2022 20:55 )  PTT:32.0 sec    RADIOLOGY & ADDITIONAL TESTS:    MEDICATIONS:  MEDICATIONS  (STANDING):  atorvastatin 80 milliGRAM(s) Oral at bedtime  dextrose 5%. 1000 milliLiter(s) (50 mL/Hr) IV Continuous <Continuous>  dextrose 5%. 1000 milliLiter(s) (100 mL/Hr) IV Continuous <Continuous>  dextrose 50% Injectable 25 Gram(s) IV Push once  dextrose 50% Injectable 12.5 Gram(s) IV Push once  dextrose 50% Injectable 25 Gram(s) IV Push once  glucagon  Injectable 1 milliGRAM(s) IntraMuscular once  insulin glargine Injectable (LANTUS) 17 Unit(s) SubCutaneous at bedtime  insulin lispro (ADMELOG) corrective regimen sliding scale   SubCutaneous three times a day before meals  insulin lispro (ADMELOG) corrective regimen sliding scale   SubCutaneous at bedtime  metoprolol succinate  milliGRAM(s) Oral daily  pantoprazole  Injectable 40 milliGRAM(s) IV Push every 12 hours  torsemide 20 milliGRAM(s) Oral daily    MEDICATIONS  (PRN):  acetaminophen     Tablet .. 650 milliGRAM(s) Oral every 6 hours PRN Temp greater or equal to 38C (100.4F), Mild Pain (1 - 3)  aluminum hydroxide/magnesium hydroxide/simethicone Suspension 30 milliLiter(s) Oral every 4 hours PRN Dyspepsia  dextrose Oral Gel 15 Gram(s) Oral once PRN Blood Glucose LESS THAN 70 milliGRAM(s)/deciliter  melatonin 3 milliGRAM(s) Oral at bedtime PRN Insomnia  ondansetron Injectable 4 milliGRAM(s) IV Push every 8 hours PRN Nausea and/or Vomiting  
[facility]  Franklin Smith PA-C  [facility] [address]  [phone]      INTERVAL HPI/OVERNIGHT EVENTS:  pt s/e  Pt reports no further nausea or vomiting  Denies further GI complaints    MEDICATIONS  (STANDING):  atorvastatin 80 milliGRAM(s) Oral at bedtime  dextrose 5%. 1000 milliLiter(s) (50 mL/Hr) IV Continuous <Continuous>  dextrose 5%. 1000 milliLiter(s) (100 mL/Hr) IV Continuous <Continuous>  dextrose 50% Injectable 25 Gram(s) IV Push once  dextrose 50% Injectable 12.5 Gram(s) IV Push once  dextrose 50% Injectable 25 Gram(s) IV Push once  glucagon  Injectable 1 milliGRAM(s) IntraMuscular once  insulin glargine Injectable (LANTUS) 25 Unit(s) SubCutaneous at bedtime  insulin lispro (ADMELOG) corrective regimen sliding scale   SubCutaneous three times a day before meals  insulin lispro (ADMELOG) corrective regimen sliding scale   SubCutaneous at bedtime  metoprolol succinate  milliGRAM(s) Oral daily  pantoprazole  Injectable 40 milliGRAM(s) IV Push every 12 hours  petrolatum white Ointment 1 Application(s) Topical two times a day    MEDICATIONS  (PRN):  acetaminophen     Tablet .. 650 milliGRAM(s) Oral every 6 hours PRN Temp greater or equal to 38C (100.4F), Mild Pain (1 - 3)  aluminum hydroxide/magnesium hydroxide/simethicone Suspension 30 milliLiter(s) Oral every 4 hours PRN Dyspepsia  dextrose Oral Gel 15 Gram(s) Oral once PRN Blood Glucose LESS THAN 70 milliGRAM(s)/deciliter  melatonin 3 milliGRAM(s) Oral at bedtime PRN Insomnia      Allergies  penicillins (Hives)      PHYSICAL EXAM:   Vital Signs Last 24 Hrs  T(C): 36.6 (20 Apr 2022 11:32), Max: 36.8 (19 Apr 2022 19:24)  T(F): 97.9 (20 Apr 2022 11:32), Max: 98.2 (19 Apr 2022 19:24)  HR: 55 (20 Apr 2022 11:32) (55 - 60)  BP: 122/52 (20 Apr 2022 11:32) (108/62 - 122/52)  BP(mean): --  RR: 17 (20 Apr 2022 11:32) (17 - 19)  SpO2: 92% (20 Apr 2022 11:32) (91% - 97%)    GENERAL:  Appears stated age  ABDOMEN:  Soft, non-tender, non-distended  NEURO:  Alert      LABS:                                   12.7   7.26  )-----------( 209      ( 20 Apr 2022 08:19 )             39.0     04-20    135  |  98  |  40<H>  ----------------------------<  131<H>  3.6   |  31  |  1.30    Ca    8.9      20 Apr 2022 08:19  Phos  3.5     04-19  Mg     2.1     04-19    TPro  6.9  /  Alb  2.8<L>  /  TBili  0.9  /  DBili  x   /  AST  18  /  ALT  21  /  AlkPhos  63  04-19      
[facility]  Franklin Smith PA-C  [facility] [place] [address]  [phone]      INTERVAL HPI/OVERNIGHT EVENTS:  pt s/e  Pt reports no further nausea or vomiting  Denies further GI complaints    MEDICATIONS  (STANDING):  atorvastatin 80 milliGRAM(s) Oral at bedtime  dextrose 5%. 1000 milliLiter(s) (50 mL/Hr) IV Continuous <Continuous>  dextrose 5%. 1000 milliLiter(s) (100 mL/Hr) IV Continuous <Continuous>  dextrose 50% Injectable 25 Gram(s) IV Push once  dextrose 50% Injectable 12.5 Gram(s) IV Push once  dextrose 50% Injectable 25 Gram(s) IV Push once  glucagon  Injectable 1 milliGRAM(s) IntraMuscular once  insulin glargine Injectable (LANTUS) 25 Unit(s) SubCutaneous at bedtime  insulin lispro (ADMELOG) corrective regimen sliding scale   SubCutaneous three times a day before meals  insulin lispro (ADMELOG) corrective regimen sliding scale   SubCutaneous at bedtime  metoprolol succinate  milliGRAM(s) Oral daily  pantoprazole  Injectable 40 milliGRAM(s) IV Push every 12 hours  petrolatum white Ointment 1 Application(s) Topical two times a day    MEDICATIONS  (PRN):  acetaminophen     Tablet .. 650 milliGRAM(s) Oral every 6 hours PRN Temp greater or equal to 38C (100.4F), Mild Pain (1 - 3)  aluminum hydroxide/magnesium hydroxide/simethicone Suspension 30 milliLiter(s) Oral every 4 hours PRN Dyspepsia  dextrose Oral Gel 15 Gram(s) Oral once PRN Blood Glucose LESS THAN 70 milliGRAM(s)/deciliter  melatonin 3 milliGRAM(s) Oral at bedtime PRN Insomnia      Allergies  penicillins (Hives)      PHYSICAL EXAM:   Vital Signs:  Vital Signs Last 24 Hrs  T(C): 36.3 (2022 04:51), Max: 36.9 (2022 20:27)  T(F): 97.4 (2022 04:51), Max: 98.4 (2022 20:27)  HR: 57 (2022 05:46) (57 - 79)  BP: 127/63 (2022 05:46) (100/50 - 131/56)  BP(mean): 81 (2022 23:04) (81 - 81)  RR: 19 (2022 04:51) (18 - 20)  SpO2: 95% (2022 04:51) (92% - 98%)  Daily     Daily Weight in k.1 (2022 04:51)    GENERAL:  Appears stated age  ABDOMEN:  Soft, non-tender, non-distended  NEURO:  Alert      LABS:                        12.0   8.81  )-----------( 208      ( 2022 07:12 )             37.3     04-19    133<L>  |  95<L>  |  38<H>  ----------------------------<  256<H>  3.8   |  31  |  1.50<H>    Ca    8.6      2022 07:12  Phos  3.5     04-19  Mg     2.1     04-19    TPro  6.9  /  Alb  2.8<L>  /  TBili  0.9  /  DBili  x   /  AST  18  /  ALT  21  /  AlkPhos  63  04-19    PT/INR - ( 2022 20:55 )   PT: 13.8 sec;   INR: 1.18 ratio         PTT - ( 2022 20:55 )  PTT:32.0 sec    
Patient is a 71y old  Female who presents with a chief complaint of vomiting, acute respiratory failure (19 Apr 2022 10:47)      TELE: pt had 12 beats vtach overnight, 1.6s pause, AM mag phos WNL.     INTERVAL HPI/OVERNIGHT EVENTS: Overnight Glu 417, pt received 4u admelog with improvement to the 300s, another 4u and lantus 17u qhs. Sliding scale was increased to moderate. Pt seen and examined at the bedside this AM. Pt is laying in bed, eating breakfast, no new concerns. States she feels better, no episodes of vomiting/diarrhea/nausea since yesterday. No hematemesis or hematochezia this AM. Denies abdominal pain. Last BM yesterday. Good appetite.    MEDICATIONS  (STANDING):  atorvastatin 80 milliGRAM(s) Oral at bedtime  dextrose 5%. 1000 milliLiter(s) (50 mL/Hr) IV Continuous <Continuous>  dextrose 5%. 1000 milliLiter(s) (100 mL/Hr) IV Continuous <Continuous>  dextrose 50% Injectable 25 Gram(s) IV Push once  dextrose 50% Injectable 12.5 Gram(s) IV Push once  dextrose 50% Injectable 25 Gram(s) IV Push once  glucagon  Injectable 1 milliGRAM(s) IntraMuscular once  insulin glargine Injectable (LANTUS) 25 Unit(s) SubCutaneous at bedtime  insulin lispro (ADMELOG) corrective regimen sliding scale   SubCutaneous three times a day before meals  insulin lispro (ADMELOG) corrective regimen sliding scale   SubCutaneous at bedtime  metoprolol succinate  milliGRAM(s) Oral daily  pantoprazole  Injectable 40 milliGRAM(s) IV Push every 12 hours  petrolatum white Ointment 1 Application(s) Topical two times a day    MEDICATIONS  (PRN):  acetaminophen     Tablet .. 650 milliGRAM(s) Oral every 6 hours PRN Temp greater or equal to 38C (100.4F), Mild Pain (1 - 3)  aluminum hydroxide/magnesium hydroxide/simethicone Suspension 30 milliLiter(s) Oral every 4 hours PRN Dyspepsia  dextrose Oral Gel 15 Gram(s) Oral once PRN Blood Glucose LESS THAN 70 milliGRAM(s)/deciliter  melatonin 3 milliGRAM(s) Oral at bedtime PRN Insomnia      Allergies    penicillins (Hives)    Intolerances        REVIEW OF SYSTEMS:  CONSTITUTIONAL: No fever or chills  HEENT:  No headache, no sore throat  RESPIRATORY: No cough, wheezing, or shortness of breath  CARDIOVASCULAR: No chest pain, palpitations  GASTROINTESTINAL: No abd pain, nausea, vomiting, or diarrhea  GENITOURINARY: No dysuria, frequency, or hematuria  NEUROLOGICAL: no focal weakness or dizziness  MUSCULOSKELETAL: no myalgias     Vital Signs Last 24 Hrs  T(C): 36.6 (19 Apr 2022 11:13), Max: 36.9 (18 Apr 2022 20:27)  T(F): 97.8 (19 Apr 2022 11:13), Max: 98.4 (18 Apr 2022 20:27)  HR: 56 (19 Apr 2022 11:13) (56 - 79)  BP: 116/63 (19 Apr 2022 11:13) (100/50 - 131/56)  BP(mean): 81 (18 Apr 2022 23:04) (81 - 81)  RR: 19 (19 Apr 2022 12:44) (18 - 20)  SpO2: 93% (19 Apr 2022 12:44) (92% - 98%)    PHYSICAL EXAM:  GENERAL: NAD, +morbidly obese   HEENT:  anicteric, moist mucous membranes  CHEST/LUNG: decreased breath sounds throughout but CTA  HEART:  RRR, S1, S2  ABDOMEN:  BS+, soft, nontender, nondistended  EXTREMITIES: b/l LE edema and erythema 2/2 lymphedema (chronic per pt). RLE worse edema compared with LLE. LLE worse erythema up to below the knee on L, when compared with RLE.  NERVOUS SYSTEM: answers questions and follows commands appropriately    LABS:                        12.0   8.81  )-----------( 208      ( 19 Apr 2022 07:12 )             37.3     CBC Full  -  ( 19 Apr 2022 07:12 )  WBC Count : 8.81 K/uL  Hemoglobin : 12.0 g/dL  Hematocrit : 37.3 %  Platelet Count - Automated : 208 K/uL  Mean Cell Volume : 86.5 fl  Mean Cell Hemoglobin : 27.8 pg  Mean Cell Hemoglobin Concentration : 32.2 gm/dL  Auto Neutrophil # : x  Auto Lymphocyte # : x  Auto Monocyte # : x  Auto Eosinophil # : x  Auto Basophil # : x  Auto Neutrophil % : x  Auto Lymphocyte % : x  Auto Monocyte % : x  Auto Eosinophil % : x  Auto Basophil % : x    19 Apr 2022 07:12    133    |  95     |  38     ----------------------------<  256    3.8     |  31     |  1.50     Ca    8.6        19 Apr 2022 07:12  Phos  3.5       19 Apr 2022 07:12  Mg     2.1       19 Apr 2022 07:12    TPro  6.9    /  Alb  2.8    /  TBili  0.9    /  DBili  x      /  AST  18     /  ALT  21     /  AlkPhos  63     19 Apr 2022 07:12    PT/INR - ( 17 Apr 2022 20:55 )   PT: 13.8 sec;   INR: 1.18 ratio         PTT - ( 17 Apr 2022 20:55 )  PTT:32.0 sec    CAPILLARY BLOOD GLUCOSE      POCT Blood Glucose.: 284 mg/dL (19 Apr 2022 11:52)  POCT Blood Glucose.: 287 mg/dL (19 Apr 2022 07:40)  POCT Blood Glucose.: 397 mg/dL (18 Apr 2022 22:34)  POCT Blood Glucose.: 417 mg/dL (18 Apr 2022 21:42)  POCT Blood Glucose.: 411 mg/dL (18 Apr 2022 21:41)  POCT Blood Glucose.: 337 mg/dL (18 Apr 2022 16:53)          RADIOLOGY & ADDITIONAL TESTS:  Personally reviewed.     Consultant(s) Notes Reviewed:  [x] YES  [ ] NO

## 2022-04-20 NOTE — DISCHARGE NOTE NURSING/CASE MANAGEMENT/SOCIAL WORK - NSDCFUADDAPPT_GEN_ALL_CORE_FT
Please follow up with a Pulmonology to be evaluated for obstructive sleep apnea.   Follow up with Gastroenterologists Dr. uLna within 1 week of discharge.

## 2022-04-20 NOTE — DISCHARGE NOTE NURSING/CASE MANAGEMENT/SOCIAL WORK - NSDCPEFALRISK_GEN_ALL_CORE
For information on Fall & Injury Prevention, visit: [email or web address] OR  [email or web address] OR  https://www.cdc.gov/steadi/patient.html

## 2022-04-21 ENCOUNTER — NON-APPOINTMENT (OUTPATIENT)
Age: 72
End: 2022-04-21

## 2022-04-21 PROBLEM — I50.22 CHRONIC SYSTOLIC (CONGESTIVE) HEART FAILURE: Chronic | Status: ACTIVE | Noted: 2022-04-19

## 2022-04-25 ENCOUNTER — LABORATORY RESULT (OUTPATIENT)
Age: 72
End: 2022-04-25

## 2022-04-27 ENCOUNTER — TRANSCRIPTION ENCOUNTER (OUTPATIENT)
Age: 72
End: 2022-04-27

## 2022-04-29 DIAGNOSIS — R82.79 OTHER ABNORMAL FINDINGS ON MICROBIOLOGICAL EXAMINATION OF URINE: ICD-10-CM

## 2022-05-02 ENCOUNTER — LABORATORY RESULT (OUTPATIENT)
Age: 72
End: 2022-05-02

## 2022-05-02 ENCOUNTER — APPOINTMENT (OUTPATIENT)
Dept: INTERNAL MEDICINE | Facility: CLINIC | Age: 72
End: 2022-05-02
Payer: MEDICARE

## 2022-05-02 VITALS
HEART RATE: 70 BPM | OXYGEN SATURATION: 90 % | DIASTOLIC BLOOD PRESSURE: 70 MMHG | SYSTOLIC BLOOD PRESSURE: 116 MMHG | HEIGHT: 71 IN | WEIGHT: 293 LBS | BODY MASS INDEX: 41.02 KG/M2 | RESPIRATION RATE: 21 BRPM | TEMPERATURE: 98 F

## 2022-05-02 DIAGNOSIS — A08.4 VIRAL INTESTINAL INFECTION, UNSPECIFIED: ICD-10-CM

## 2022-05-02 PROCEDURE — 36415 COLL VENOUS BLD VENIPUNCTURE: CPT

## 2022-05-02 PROCEDURE — 99495 TRANSJ CARE MGMT MOD F2F 14D: CPT | Mod: 25

## 2022-05-02 RX ORDER — SYRINGE AND NEEDLE,INSULIN,1ML 28GX1/2"
30G X 1/2" SYRINGE, EMPTY DISPOSABLE MISCELLANEOUS
Qty: 400 | Refills: 0 | Status: ACTIVE | COMMUNITY
Start: 2021-04-26

## 2022-05-02 NOTE — HEALTH RISK ASSESSMENT
[Intercurrent hospitalizations] : was admitted to the hospital  [Never] : Never [Yes] : Yes [Monthly or less (1 pt)] : Monthly or less (1 point) [1 or 2 (0 pts)] : 1 or 2 (0 points) [Never (0 pts)] : Never (0 points) [No] : In the past 12 months have you used drugs other than those required for medical reasons? No [No falls in past year] : Patient reported no falls in the past year [0] : 2) Feeling down, depressed, or hopeless: Not at all (0) [PHQ-2 Negative - No further assessment needed] : PHQ-2 Negative - No further assessment needed [None] : None [With Significant Other] : lives with significant other [# of Members in Household ___] :  household currently consist of [unfilled] member(s) [Retired] : retired [College] : College [Single] : single [Significant Other] : lives with significant other [# Of Children ___] : has [unfilled] children [Sexually Active] : sexually active [Feels Safe at Home] : Feels safe at home [Fully functional (bathing, dressing, toileting, transferring, walking, feeding)] : Fully functional (bathing, dressing, toileting, transferring, walking, feeding) [Fully functional (using the telephone, shopping, preparing meals, housekeeping, doing laundry, using] : Fully functional and needs no help or supervision to perform IADLs (using the telephone, shopping, preparing meals, housekeeping, doing laundry, using transportation, managing medications and managing finances) [Smoke Detector] : smoke detector [Carbon Monoxide Detector] : carbon monoxide detector [Seat Belt] :  uses seat belt [de-identified] : [facility]  [de-identified] : Cardio, Endo (Dr AVN),  Podiatrist (Dr Mohan), Ophthalmologist (Dr. Feliz Garcia)  [de-identified] : very rare  [Audit-CScore] : 1 [de-identified] : none  [de-identified] : ADA, Low na diet  [[id number]] : 0 [Reports changes in vision] : Reports no changes in vision [MammogramComments] : 10 years ago normal  [PapSmearComments] : last PAP about 15 years ago wnl  [HepatitisCDate] : 01/21 [HepatitisCComments] : negative (HIE records)

## 2022-05-02 NOTE — REVIEW OF SYSTEMS
[Negative] : Heme/Lymph [Fever] : no fever [Recent Change In Weight] : ~T recent weight change [Pain] : no pain [Itching] : no itching [Earache] : no earache [Hearing Loss] : no hearing loss [Nasal Discharge] : no nasal discharge [Sore Throat] : no sore throat [Lower Ext Edema] : lower extremity edema [Shortness Of Breath] : no shortness of breath [Dyspnea on Exertion] : no dyspnea on exertion [Abdominal Pain] : no abdominal pain [Nausea] : no nausea [Constipation] : no constipation [Diarrhea] : diarrhea [Vomiting] : no vomiting [Heartburn] : no heartburn [Melena] : no melena [Dysuria] : no dysuria [Incontinence] : no incontinence [Hematuria] : no hematuria [Frequency] : no frequency [Joint Pain] : no joint pain [Joint Stiffness] : no joint stiffness [Muscle Pain] : no muscle pain [Back Pain] : no back pain [Itching] : Itching [Skin Rash] : skin rash [Easy Bleeding] : no easy bleeding [Easy Bruising] : no easy bruising [Swollen Glands] : no swollen glands [FreeTextEntry2] : weight loss  [FreeTextEntry5] : improved LE edema

## 2022-05-02 NOTE — PLAN
[FreeTextEntry1] : GI - Viral gastroenteritis - Advised blend diet - finish protonix daily - Once completed rx than monitor for heart burn.  If notice any symptoms to call the office.\par \par Cardio - CHF - Repeat BNP.  Continue with current medications.  Continue to monitor weight \par Low sodium diet.\par Continue wo elevated legs  \par Follow up with Cardio  Monitor GFR  - and electrolytes \par \par Dermatology EVELYN was encouraged to wear sun protective clothing, sun block, and proper use of SPF board brim hats, to seek shade and to avoid the sun in peak hours.  ABCD of skin moles was advised. \par Dry skin advised to avoid itching skin  - advised source of infection \par \par Endo - borderline Hypothyroidism -  Check TSH free t4 if remains  elevated will start Synthroid \par \par Adult BMI screening and followup:  The patient's BMI is about normal. Counseled patient regarding BMI, healthy eating, portion control and exercise importance of diet to maintain a healthy weight. \par Counseled patient on importance of exercise to maintain a healthy weight  \par Continue with weight loss \par \par will call pt the end of the week to review labs \par \par \par

## 2022-05-02 NOTE — HISTORY OF PRESENT ILLNESS
[Post-hospitalization from ___ Hospital] : Post-hospitalization from [unfilled] Hospital [Discharged on ___] : discharged on [unfilled] [Discharge Med List] : discharge medication list [Med Reconciliation] : medication reconciliation has been completed [Patient Contacted By: ____] : and contacted by [unfilled] [Admitted on: ___] : The patient was admitted on [unfilled] [FreeTextEntry2] : Went to ED 04/17/22 for vomiting and dehydration - Was worked up for a GI bleed bit   No active bleeding was found.  Hospital course was unremarkable. Was discharge with Protonix \par \par S/p discharge  continue to lose weight- Feels really good.  Denies having chest pain, SOB, Nausea or vomiting.  \par States legs look much better

## 2022-05-02 NOTE — COUNSELING
[Fall prevention counseling provided] : Fall prevention counseling provided [No throw rugs] : No throw rugs [Use proper foot wear] : Use proper foot wear [Use recommended devices] : Use recommended devices [FreeTextEntry1] : cane  [Encouraged to increase physical activity] : Encouraged to increase physical activity [Decrease Portions] : decrease portions [Good understanding] : Patient has a good understanding of disease, goals and obesity follow-up plan

## 2022-05-02 NOTE — PHYSICAL EXAM
[No Acute Distress] : no acute distress [Well Nourished] : well nourished [Well Developed] : well developed [Well-Appearing] : well-appearing [Normal Sclera/Conjunctiva] : normal sclera/conjunctiva [PERRL] : pupils equal round and reactive to light [EOMI] : extraocular movements intact [Normal Outer Ear/Nose] : the outer ears and nose were normal in appearance [Normal Oropharynx] : the oropharynx was normal [Normal TMs] : both tympanic membranes were normal [No JVD] : no jugular venous distention [No Lymphadenopathy] : no lymphadenopathy [Supple] : supple [Thyroid Normal, No Nodules] : the thyroid was normal and there were no nodules present [No Respiratory Distress] : no respiratory distress  [No Accessory Muscle Use] : no accessory muscle use [Clear to Auscultation] : lungs were clear to auscultation bilaterally [Normal Rate] : normal rate  [Regular Rhythm] : with a regular rhythm [Normal S1, S2] : normal S1 and S2 [No Carotid Bruits] : no carotid bruits [No Abdominal Bruit] : a ~M bruit was not heard ~T in the abdomen [Pedal Pulses Present] : the pedal pulses are present [No Palpable Aorta] : no palpable aorta [No Extremity Clubbing/Cyanosis] : no extremity clubbing/cyanosis [Soft] : abdomen soft [Non Tender] : non-tender [Non-distended] : non-distended [No Masses] : no abdominal mass palpated [No HSM] : no HSM [Normal Bowel Sounds] : normal bowel sounds [Obese] : obese [Normal Posterior Cervical Nodes] : no posterior cervical lymphadenopathy [Normal Anterior Cervical Nodes] : no anterior cervical lymphadenopathy [No CVA Tenderness] : no CVA  tenderness [No Spinal Tenderness] : no spinal tenderness [No Joint Swelling] : no joint swelling [Grossly Normal Strength/Tone] : grossly normal strength/tone [Coordination Grossly Intact] : coordination grossly intact [No Focal Deficits] : no focal deficits [Normal Gait] : normal gait [Deep Tendon Reflexes (DTR)] : deep tendon reflexes were 2+ and symmetric [Speech Grossly Normal] : speech grossly normal [Normal Affect] : the affect was normal [Alert and Oriented x3] : oriented to person, place, and time [Normal Mood] : the mood was normal [Normal Insight/Judgement] : insight and judgment were intact [de-identified] : with murmur  [de-identified] : edema noted chronic lymphedema  - chronic vasc changes lower ext  improved since last office visit  [de-identified] : as per gyn  [de-identified] : patches of psoriasis  trunk arms and legs - dry skin Lower ext  [de-identified] : walks with a cane  [91339 - Moderate Complexity requires multiple possible diagnoses and/or the management options, moderate complexity of the medical data (tests, etc.) to be reviewed, and moderate risk of significant complications, morbidity, and/or mortality as well as co] : Moderate Complexity

## 2022-05-02 NOTE — ASSESSMENT
[FreeTextEntry1] : Ms. LOPEZ is a 71 year  female, who present to the office for a hospital follow up s/p gastroenteritis \par

## 2022-05-05 ENCOUNTER — RX RENEWAL (OUTPATIENT)
Age: 72
End: 2022-05-05

## 2022-05-05 RX ORDER — PANTOPRAZOLE SODIUM 20 MG/1
1 TABLET, DELAYED RELEASE ORAL
Qty: 16 | Refills: 0
Start: 2022-05-05 | End: 2022-05-20

## 2022-05-16 ENCOUNTER — NON-APPOINTMENT (OUTPATIENT)
Age: 72
End: 2022-05-16

## 2022-05-16 ENCOUNTER — APPOINTMENT (OUTPATIENT)
Dept: CARDIOLOGY | Facility: CLINIC | Age: 72
End: 2022-05-16
Payer: MEDICARE

## 2022-05-16 VITALS
BODY MASS INDEX: 41.02 KG/M2 | SYSTOLIC BLOOD PRESSURE: 93 MMHG | OXYGEN SATURATION: 95 % | DIASTOLIC BLOOD PRESSURE: 60 MMHG | HEIGHT: 71 IN | HEART RATE: 74 BPM | WEIGHT: 293 LBS

## 2022-05-16 PROCEDURE — 93000 ELECTROCARDIOGRAM COMPLETE: CPT

## 2022-05-16 PROCEDURE — 99214 OFFICE O/P EST MOD 30 MIN: CPT

## 2022-05-16 RX ORDER — ENALAPRIL MALEATE 20 MG/1
20 TABLET ORAL
Qty: 90 | Refills: 1 | Status: DISCONTINUED | COMMUNITY
Start: 1900-01-01 | End: 2022-05-16

## 2022-05-18 LAB
ALBUMIN SERPL ELPH-MCNC: 4 G/DL
ALP BLD-CCNC: 105 U/L
ALT SERPL-CCNC: 29 U/L
ANION GAP SERPL CALC-SCNC: 18 MMOL/L
AST SERPL-CCNC: 40 U/L
BILIRUB SERPL-MCNC: 0.6 MG/DL
BUN SERPL-MCNC: 40 MG/DL
CALCIUM SERPL-MCNC: 9.9 MG/DL
CHLORIDE SERPL-SCNC: 94 MMOL/L
CO2 SERPL-SCNC: 25 MMOL/L
CREAT SERPL-MCNC: 1.39 MG/DL
EGFR: 41 ML/MIN/1.73M2
GLUCOSE SERPL-MCNC: 293 MG/DL
NT-PROBNP SERPL-MCNC: 4031 PG/ML
POTASSIUM SERPL-SCNC: 5.2 MMOL/L
PROT SERPL-MCNC: 8 G/DL
SODIUM SERPL-SCNC: 137 MMOL/L
TSH SERPL-ACNC: 4.53 UIU/ML

## 2022-06-16 ENCOUNTER — NON-APPOINTMENT (OUTPATIENT)
Age: 72
End: 2022-06-16

## 2022-06-16 ENCOUNTER — APPOINTMENT (OUTPATIENT)
Dept: CARDIOLOGY | Facility: CLINIC | Age: 72
End: 2022-06-16
Payer: MEDICARE

## 2022-06-16 VITALS
WEIGHT: 293 LBS | DIASTOLIC BLOOD PRESSURE: 77 MMHG | HEART RATE: 66 BPM | HEIGHT: 71 IN | BODY MASS INDEX: 41.02 KG/M2 | SYSTOLIC BLOOD PRESSURE: 129 MMHG | OXYGEN SATURATION: 97 %

## 2022-06-16 PROCEDURE — 93000 ELECTROCARDIOGRAM COMPLETE: CPT

## 2022-06-16 PROCEDURE — 99214 OFFICE O/P EST MOD 30 MIN: CPT

## 2022-06-16 RX ORDER — TORSEMIDE 20 MG/1
20 TABLET ORAL
Qty: 180 | Refills: 3 | Status: DISCONTINUED | COMMUNITY
Start: 2022-04-12 | End: 2022-06-16

## 2022-06-20 NOTE — ADDENDUM
[FreeTextEntry1] : Pt descended to 2.0 EULALIO @ 1.75 PSI/MIN without incident in chamber #2.\par Pt resting comfortably @ depth, with equal chest rise observed throughout tx.\par Pt ascended from 2.0 EULALIO @ 1.75 PSI/MIN without incident in chamber #2.\par Pt tolerated treatment well.\par PT'S PICC LINE MEASURED POST HBOT. \par Dressing changed prior to hbot.  - - -

## 2022-06-21 RX ORDER — DAPAGLIFLOZIN 5 MG/1
5 TABLET, FILM COATED ORAL
Qty: 30 | Refills: 3 | Status: DISCONTINUED | COMMUNITY
Start: 2022-06-16 | End: 2022-06-21

## 2022-07-13 ENCOUNTER — MED ADMIN CHARGE (OUTPATIENT)
Age: 72
End: 2022-07-13

## 2022-07-13 ENCOUNTER — APPOINTMENT (OUTPATIENT)
Dept: CARDIOLOGY | Facility: CLINIC | Age: 72
End: 2022-07-13

## 2022-07-13 PROCEDURE — 93306 TTE W/DOPPLER COMPLETE: CPT

## 2022-07-13 RX ORDER — PERFLUTREN 6.52 MG/ML
6.52 INJECTION, SUSPENSION INTRAVENOUS
Qty: 1 | Refills: 0 | Status: COMPLETED | OUTPATIENT
Start: 2022-07-13

## 2022-07-13 RX ADMIN — PERFLUTREN MG/ML: 6.52 INJECTION, SUSPENSION INTRAVENOUS at 00:00

## 2022-07-19 ENCOUNTER — NON-APPOINTMENT (OUTPATIENT)
Age: 72
End: 2022-07-19

## 2022-07-19 ENCOUNTER — APPOINTMENT (OUTPATIENT)
Dept: CARDIOLOGY | Facility: CLINIC | Age: 72
End: 2022-07-19

## 2022-07-19 VITALS
WEIGHT: 293 LBS | HEART RATE: 65 BPM | HEIGHT: 71 IN | BODY MASS INDEX: 41.02 KG/M2 | OXYGEN SATURATION: 92 % | DIASTOLIC BLOOD PRESSURE: 76 MMHG | SYSTOLIC BLOOD PRESSURE: 168 MMHG

## 2022-07-19 PROCEDURE — 93000 ELECTROCARDIOGRAM COMPLETE: CPT

## 2022-07-19 PROCEDURE — 99215 OFFICE O/P EST HI 40 MIN: CPT

## 2022-07-21 NOTE — DIETITIAN INITIAL EVALUATION ADULT. - ENTER TO (CAL/KG)
Gen: No fever, normal appetite  Eyes: No eye irritation or discharge  ENT: No ear pain, congestion, sore throat  Resp: No cough or trouble breathing  Cardiovascular: No chest pain or palpitation  Gastroenteric:  abdominal pain   :  No change in urine output; no dysuria  MS: No joint or muscle pain  Skin: No rashes  Neuro: No headache; no abnormal movements  Remainder negative, except as per the HPI
30

## 2022-07-27 ENCOUNTER — OUTPATIENT (OUTPATIENT)
Dept: OUTPATIENT SERVICES | Facility: HOSPITAL | Age: 72
LOS: 1 days | End: 2022-07-27
Payer: MEDICARE

## 2022-07-27 ENCOUNTER — TRANSCRIPTION ENCOUNTER (OUTPATIENT)
Age: 72
End: 2022-07-27

## 2022-07-27 VITALS
HEART RATE: 59 BPM | TEMPERATURE: 98 F | RESPIRATION RATE: 18 BRPM | HEIGHT: 71 IN | SYSTOLIC BLOOD PRESSURE: 152 MMHG | DIASTOLIC BLOOD PRESSURE: 65 MMHG | OXYGEN SATURATION: 95 % | WEIGHT: 293 LBS

## 2022-07-27 VITALS
OXYGEN SATURATION: 97 % | RESPIRATION RATE: 18 BRPM | HEART RATE: 55 BPM | DIASTOLIC BLOOD PRESSURE: 76 MMHG | SYSTOLIC BLOOD PRESSURE: 156 MMHG

## 2022-07-27 DIAGNOSIS — Z98.890 OTHER SPECIFIED POSTPROCEDURAL STATES: Chronic | ICD-10-CM

## 2022-07-27 DIAGNOSIS — Z86.69 PERSONAL HISTORY OF OTHER DISEASES OF THE NERVOUS SYSTEM AND SENSE ORGANS: Chronic | ICD-10-CM

## 2022-07-27 DIAGNOSIS — M75.00 ADHESIVE CAPSULITIS OF UNSPECIFIED SHOULDER: Chronic | ICD-10-CM

## 2022-07-27 DIAGNOSIS — S02.91XA UNSPECIFIED FRACTURE OF SKULL, INITIAL ENCOUNTER FOR CLOSED FRACTURE: Chronic | ICD-10-CM

## 2022-07-27 DIAGNOSIS — I50.9 HEART FAILURE, UNSPECIFIED: ICD-10-CM

## 2022-07-27 LAB
ANION GAP SERPL CALC-SCNC: 9 MMOL/L — SIGNIFICANT CHANGE UP (ref 5–17)
BUN SERPL-MCNC: 15 MG/DL — SIGNIFICANT CHANGE UP (ref 7–23)
CALCIUM SERPL-MCNC: 8.5 MG/DL — SIGNIFICANT CHANGE UP (ref 8.4–10.5)
CHLORIDE SERPL-SCNC: 105 MMOL/L — SIGNIFICANT CHANGE UP (ref 96–108)
CO2 SERPL-SCNC: 26 MMOL/L — SIGNIFICANT CHANGE UP (ref 22–31)
CREAT SERPL-MCNC: 0.86 MG/DL — SIGNIFICANT CHANGE UP (ref 0.5–1.3)
EGFR: 73 ML/MIN/1.73M2 — SIGNIFICANT CHANGE UP
GLUCOSE BLDC GLUCOMTR-MCNC: 112 MG/DL — HIGH (ref 70–99)
GLUCOSE SERPL-MCNC: 124 MG/DL — HIGH (ref 70–99)
HCT VFR BLD CALC: 39.7 % — SIGNIFICANT CHANGE UP (ref 34.5–45)
HGB BLD-MCNC: 12.6 G/DL — SIGNIFICANT CHANGE UP (ref 11.5–15.5)
MCHC RBC-ENTMCNC: 29.9 PG — SIGNIFICANT CHANGE UP (ref 27–34)
MCHC RBC-ENTMCNC: 31.7 GM/DL — LOW (ref 32–36)
MCV RBC AUTO: 94.1 FL — SIGNIFICANT CHANGE UP (ref 80–100)
NRBC # BLD: 0 /100 WBCS — SIGNIFICANT CHANGE UP (ref 0–0)
PLATELET # BLD AUTO: 259 K/UL — SIGNIFICANT CHANGE UP (ref 150–400)
POTASSIUM SERPL-MCNC: 4.7 MMOL/L — SIGNIFICANT CHANGE UP (ref 3.5–5.3)
POTASSIUM SERPL-SCNC: 4.7 MMOL/L — SIGNIFICANT CHANGE UP (ref 3.5–5.3)
RBC # BLD: 4.22 M/UL — SIGNIFICANT CHANGE UP (ref 3.8–5.2)
RBC # FLD: 15.8 % — HIGH (ref 10.3–14.5)
SODIUM SERPL-SCNC: 140 MMOL/L — SIGNIFICANT CHANGE UP (ref 135–145)
WBC # BLD: 7.35 K/UL — SIGNIFICANT CHANGE UP (ref 3.8–10.5)
WBC # FLD AUTO: 7.35 K/UL — SIGNIFICANT CHANGE UP (ref 3.8–10.5)

## 2022-07-27 PROCEDURE — C1887: CPT

## 2022-07-27 PROCEDURE — 93454 CORONARY ARTERY ANGIO S&I: CPT | Mod: 26

## 2022-07-27 PROCEDURE — 93010 ELECTROCARDIOGRAM REPORT: CPT

## 2022-07-27 PROCEDURE — 80048 BASIC METABOLIC PNL TOTAL CA: CPT

## 2022-07-27 PROCEDURE — C1894: CPT

## 2022-07-27 PROCEDURE — 36415 COLL VENOUS BLD VENIPUNCTURE: CPT

## 2022-07-27 PROCEDURE — 93005 ELECTROCARDIOGRAM TRACING: CPT | Mod: XU

## 2022-07-27 PROCEDURE — 99152 MOD SED SAME PHYS/QHP 5/>YRS: CPT

## 2022-07-27 PROCEDURE — 82962 GLUCOSE BLOOD TEST: CPT

## 2022-07-27 PROCEDURE — C1769: CPT

## 2022-07-27 PROCEDURE — 85027 COMPLETE CBC AUTOMATED: CPT

## 2022-07-27 PROCEDURE — 93454 CORONARY ARTERY ANGIO S&I: CPT

## 2022-07-27 RX ORDER — METOPROLOL TARTRATE 50 MG
1 TABLET ORAL
Qty: 0 | Refills: 0 | DISCHARGE

## 2022-07-27 RX ORDER — SODIUM CHLORIDE 9 MG/ML
3 INJECTION INTRAMUSCULAR; INTRAVENOUS; SUBCUTANEOUS EVERY 8 HOURS
Refills: 0 | Status: DISCONTINUED | OUTPATIENT
Start: 2022-07-27 | End: 2022-08-10

## 2022-07-27 NOTE — ASU DISCHARGE PLAN (ADULT/PEDIATRIC) - ASU DC SPECIAL INSTRUCTIONSFT
Wound Care:   the day AFTER your procedure remove bandage GENTLY, and clean using  mild soap and gentle warm, water stream, pat dry. Leave OPEN to air. YOU MAY SHOWER.  DO NOT apply lotions, creams, ointments, powder, perfumes to your incision site  DO NOT SOAK your site for 1 week ( no baths, no pools, no tubs)  Check  your groin and/or wrist daily. A small amount of bruising and soreness are normal.    ACTIVITY: For 24 hours   - DO NOT DRIVE  - DO NOT make any important decisions or sign legal documents   - DO NOT operate heavy machinery   - you may resume sexual activity in 48 hours, unless otherwise instructed by your cardiologist     If your procedure was done through the WRIST: For the next 3 days  - avoid pushing, pulling, with that affected wrist   - avoid repeated movement of that hand and wrist ( eg: typing, hammering)  - DO NOT LIFT anything more than 5 lbs     If your procedure was done through the GROIN: For the next 5 days  - Limit climbing stairs, DO NOT soak in bathtub or pool  - no strenuous activities, pushing, pulling, straining  - Do not lift anything 10lbs or heavier     MEDICATION:   Take your medications as explained (see discharge paperwork).  If you received a STENT, you will be taking antiplatelet medications to KEEP YOUR STENT OPEN ( eg: Aspirin, Plavix, Brilinta, Effient, etc).  Take as prescribed DO NOT STOP taking them without consulting with your cardiologist first.     Follow heart healthy diet recommended by your doctor. If you smoke STOP SMOKING ( you may call [phone] for center of tobacco control if you need assistance)     CALL your doctor to make appointment in 2 WEEKS     ***CALL YOUR DOCTOR***  if you experience: fever, chills, body aches, or severe pain, swelling, redness, heat or yellow discharge at incision site  If you experience Bleeding or excruciating pain at the procedural site, swelling ( golf ball size) at your procedural site  If you experience CHEST PAIN  If you experience extremity numbness, tingling, temperature change (of your procedural site)   If you are unable to reach your doctor, you may contact:   -Cardiology Office at [facility] at [phone] or   - [facility] [phone]  - [facility] [phone]

## 2022-07-27 NOTE — H&P CARDIOLOGY - HEART RATE (BEATS/MIN)
57
How Severe Is Your Skin Lesion?: mild
Have Your Skin Lesions Been Treated?: not been treated
Is This A New Presentation, Or A Follow-Up?: Skin Lesions

## 2022-07-27 NOTE — H&P CARDIOLOGY - HISTORY OF PRESENT ILLNESS
70 yo F PMHx HTN, HLD, DMT2, LBBB, chronic lymphedema, HFrEF (EF 35-40% on TTE 4/4/2022) presents today for cardiac angiogram. Patient was recently admitted to [facility] with increased dyspnea and decompensated HF in April 2022 requiring IV diuresis, repeat TTE with o/p cardiologist (Dr Nathan) reveals moderate AS and segmental wall motion abnormalities with LAD distribution hypokinesis. Here today for cardiac angiogram for further ischemic evaluation

## 2022-07-27 NOTE — ASU DISCHARGE PLAN (ADULT/PEDIATRIC) - COMMENTS
Follow up with your cardiologist and primary doctor in 1-2 weeks.  Follow up with your cardiologist and primary doctor in 1-2 weeks.

## 2022-07-27 NOTE — H&P CARDIOLOGY - NSICDXPASTMEDICALHX_GEN_ALL_CORE_FT
PAST MEDICAL HISTORY:  Diabetes     H/O left bundle branch block     History of left bundle branch block (LBBB)     Hypertension     Lymphedema     Systolic heart failure, chronic

## 2022-07-27 NOTE — ASU DISCHARGE PLAN (ADULT/PEDIATRIC) - NS MD DC FALL RISK RISK
For information on Fall & Injury Prevention, visit: [email or web address] OR  [email or web address] OR  https://www.cdc.gov/steadi/patient.html

## 2022-07-27 NOTE — ASU PATIENT PROFILE, ADULT - FALL HARM RISK - UNIVERSAL INTERVENTIONS
Bed in lowest position, wheels locked, appropriate side rails in place/Call bell, personal items and telephone in reach/Instruct patient to call for assistance before getting out of bed or chair/Non-slip footwear when patient is out of bed/[place] to call system/Physically safe environment - no spills, clutter or unnecessary equipment/Purposeful Proactive Rounding/Room/bathroom lighting operational, light cord in reach

## 2022-07-27 NOTE — ASU DISCHARGE PLAN (ADULT/PEDIATRIC) - CARE PROVIDER_API CALL
Rigoberto Luong)  Card[facility]  [address] [place] [address]  Phone: [phone]  Fax: [phone]  Follow Up Time:

## 2022-08-08 NOTE — PROGRESS NOTE ADULT - ASSESSMENT
Problem: At Risk for Falls  Goal: # Patient does not fall  Outcome: Outcome Met, Continue evaluating goal progress toward completion     Problem: Pain  Goal: #Acceptable pain level achieved/maintained at rest using NRS/Faces  Description: This goal is used for patients who can self-report.  Acceptable means the level is at or below the identified comfort/function goal.  Outcome: Outcome Met, Continue evaluating goal progress toward completion     Problem: Organ Transplant Surgery  Goal: Oral intake is resumed and tolerated  Outcome: Outcome Met, Continue evaluating goal progress toward completion     Problem: Pressure Injury, Risk for  Goal: # Skin remains intact  Outcome: Outcome Met, Continue evaluating goal progress toward completion      71 year old F with PMH of HTN, DM2, LBBB, lymphedema admitted for CHF 2/2 worsening chronic lymphedema.

## 2022-08-09 ENCOUNTER — RX RENEWAL (OUTPATIENT)
Age: 72
End: 2022-08-09

## 2022-08-12 ENCOUNTER — NON-APPOINTMENT (OUTPATIENT)
Age: 72
End: 2022-08-12

## 2022-08-12 ENCOUNTER — APPOINTMENT (OUTPATIENT)
Dept: CARDIOLOGY | Facility: CLINIC | Age: 72
End: 2022-08-12

## 2022-08-12 VITALS
WEIGHT: 293 LBS | SYSTOLIC BLOOD PRESSURE: 130 MMHG | DIASTOLIC BLOOD PRESSURE: 70 MMHG | BODY MASS INDEX: 41.02 KG/M2 | HEART RATE: 67 BPM | HEIGHT: 71 IN | OXYGEN SATURATION: 97 %

## 2022-08-12 DIAGNOSIS — E87.70 FLUID OVERLOAD, UNSPECIFIED: ICD-10-CM

## 2022-08-12 PROCEDURE — 99214 OFFICE O/P EST MOD 30 MIN: CPT

## 2022-08-12 PROCEDURE — 93000 ELECTROCARDIOGRAM COMPLETE: CPT

## 2022-08-12 RX ORDER — ENALAPRIL MALEATE 10 MG/1
10 TABLET ORAL
Qty: 30 | Refills: 0 | Status: DISCONTINUED | COMMUNITY
Start: 2022-04-04

## 2022-08-12 NOTE — DISCUSSION/SUMMARY
[FreeTextEntry1] : This is a 72 year old woman with a history of obesity, diabetes, hypertension, hyperlipidemia, and chronic lymphedema who presents to the office for a follow up visit. She was just admitted to  with increased dyspnea and decompensated heart failure. She was diuresed with IV Lasix.  She had an echocardiogram that was a technically difficult study that showed an EF of 35-40%.  This appears to be new.  SHe had a cath in [place] in 2013 that was normal. \par \par She is in a bit of distress today.  she is short of breath with activity and reports to be orthopneic.  Physical exam is consistent with signs of fluid volume excess.  ECG illustrates SR, LBBB, unchanged from previous.  Her blood pressure is controlled.\par I have advised she resume Torsemide 20mg BID starting today, both doses in today. BMP on discharge after angiogram was normal, K+ of 4.7, creatinine of 0.86\par She will continue GDMT for NICM including Toprol 100 QD,  Entresto 24/26 bid. She will continue eplerenone 25 QD. \par I will see her back again in one month.  She knows to call the office with any issues or worsening symptoms.

## 2022-08-12 NOTE — PHYSICAL EXAM
[Well Developed] : well developed [Well Nourished] : well nourished [No Acute Distress] : no acute distress [Normal Conjunctiva] : normal conjunctiva [Normal Venous Pressure] : normal venous pressure [No Carotid Bruit] : no carotid bruit [Normal S1, S2] : normal S1, S2 [No Murmur] : no murmur [No Rub] : no rub [No Gallop] : no gallop [Clear Lung Fields] : clear lung fields [Good Air Entry] : good air entry [No Respiratory Distress] : no respiratory distress  [Soft] : abdomen soft [Non Tender] : non-tender [No Masses/organomegaly] : no masses/organomegaly [Normal Bowel Sounds] : normal bowel sounds [Normal Gait] : normal gait [No Cyanosis] : no cyanosis [No Clubbing] : no clubbing [No Varicosities] : no varicosities [No Rash] : no rash [No Skin Lesions] : no skin lesions [Moves all extremities] : moves all extremities [No Focal Deficits] : no focal deficits [Normal Speech] : normal speech [Alert and Oriented] : alert and oriented [Normal memory] : normal memory [Rhythm Regular] : regular [Normal S1] : normal S1 [Normal S2] : normal S2 [II] : a grade 2 [___ +] : bilateral [unfilled]U+ pitting edema to the ankles [Right Carotid Bruit] : no bruit heard over the right carotid [Left Carotid Bruit] : no bruit heard over the left carotid [de-identified] : b/l chronic lymphedema

## 2022-08-12 NOTE — REVIEW OF SYSTEMS
[Negative] : Heme/Lymph [SOB] : shortness of breath [Dyspnea on exertion] : dyspnea during exertion [Lower Ext Edema] : lower extremity edema [Orthopnea] : orthopnea [PND] : PND [FreeTextEntry5] : see HPI

## 2022-08-12 NOTE — CARDIOLOGY SUMMARY
[de-identified] : Sr.  First degree AVB.  LBBB [de-identified] : 7/1322\par anterior.anteroseptal hypokinesis\par stage II diastolic dysfunction [de-identified] : 7/27/22\par LAD, LCX, RCA minor luminal irregularities\par NO AS, NO MR\par

## 2022-08-12 NOTE — HISTORY OF PRESENT ILLNESS
[FreeTextEntry1] : This is a 71 year old woman with a history of obesity, diabetes, hypertension, hyperlipidemia, and chronic lymphedema who presents to the office for a follow up visit. She was just admitted to  with increased dyspnea and decompensated heart failure. She was diuresed with IV Lasix.  She had an echocardiogram that was a technically difficult study that showed an EF of 35-40%.  Review of old records shows that this is new.  I have been titrating GDMT on her.  SHe is tolerating Entresto.  She had a cath at at [place] in 2013 that was normal.  She reports that her CURTIS and LE edema are both improved.  NO palpitations, dizziness, or syncope.  She is taking all of her medications as prescribed.  Her repeat echocardiogram showed moderate AS and segmental wall motion abnormalities with LAD distribution hypokinesis.  \par \par Ms Chowdhury arrives today 8/12/22 s/p angiogram on 7/27/22.\par \par Angiogram resulting Minor luminal irregularities , no occlusive disease.  NO AS or MR.\par \par She has not been feeling so well the past few days.  she has noticed increased dyspnea with exertion.  Difficulties laying flat.  She slept in the chair last night due to shortness of breath.  She also has developed a cough. \par Her weight is up since discharge about 10 pounds.\par \par She denies chest pains or pressure. She  denies dizzy spells, feeling faint or fainting, She   denies palpitations.\par \par she has been compliant with medications.

## 2022-08-15 ENCOUNTER — APPOINTMENT (OUTPATIENT)
Dept: INTERNAL MEDICINE | Facility: CLINIC | Age: 72
End: 2022-08-15

## 2022-08-15 VITALS
HEIGHT: 71 IN | HEART RATE: 61 BPM | BODY MASS INDEX: 41.02 KG/M2 | TEMPERATURE: 97.3 F | SYSTOLIC BLOOD PRESSURE: 124 MMHG | WEIGHT: 293 LBS | RESPIRATION RATE: 16 BRPM | DIASTOLIC BLOOD PRESSURE: 68 MMHG | OXYGEN SATURATION: 96 %

## 2022-08-15 DIAGNOSIS — Z12.39 ENCOUNTER FOR OTHER SCREENING FOR MALIGNANT NEOPLASM OF BREAST: ICD-10-CM

## 2022-08-15 DIAGNOSIS — R01.1 CARDIAC MURMUR, UNSPECIFIED: ICD-10-CM

## 2022-08-15 DIAGNOSIS — Z13.820 ENCOUNTER FOR SCREENING FOR OSTEOPOROSIS: ICD-10-CM

## 2022-08-15 DIAGNOSIS — Z12.11 ENCOUNTER FOR SCREENING FOR MALIGNANT NEOPLASM OF COLON: ICD-10-CM

## 2022-08-15 PROCEDURE — 99214 OFFICE O/P EST MOD 30 MIN: CPT

## 2022-08-15 RX ORDER — NITROFURANTOIN (MONOHYDRATE/MACROCRYSTALS) 25; 75 MG/1; MG/1
100 CAPSULE ORAL
Qty: 14 | Refills: 0 | Status: DISCONTINUED | COMMUNITY
Start: 2022-04-29 | End: 2022-08-15

## 2022-08-15 NOTE — COUNSELING
[Potential consequences of obesity discussed] : Potential consequences of obesity discussed [Benefits of weight loss discussed] : Benefits of weight loss discussed [Encouraged to use exercise tracking device] : Encouraged to use exercise tracking device [Weigh Self Weekly] : weigh self weekly [Decrease Portions] : decrease portions [Good understanding] : Patient has a good understanding of disease, goals and obesity follow-up plan [FreeTextEntry2] : ada diet

## 2022-08-15 NOTE — PLAN
[FreeTextEntry1] :  Cardiology-congestive heart failure   continue with torsemide 20 mg 1 tab twice a day.  Monitor renal function and electrolytes.  Advised patient to follow-up with cardiology as directed.  Advised patient low-sodium diet.  Activities as tolerated.  Reviewed cardiology consult.\par Continue wearing TR stockings.  Elevate legs as needed.  Consider lymphedema clinic\par Check proBNP.\par Continue with Entresto. \par \par Hyperlipidemia -   Advised low fat cholesterol diet.  Advised importance of having low cholesterol / LDL/ triglycerides. Advised life style modifications.  Continue with current medications.  Check FLP/LFT \par \par Endocrinology- diabetes-   elevated blood glucose level.  Continue to monitor blood glucose via Dexcom 6 advised to call her endocrinologist and discuss the elevated readings.  Continue with current medication regimen.  Advised American diabetic diet 1600 lewis.  Low-sodium low-fat low-cholesterol.  Check comprehensive metabolic with a hemoglobin A1c urine microalbumin.\par Advised to follow-up with ophthalmology for diabetic eye exam.\par Advised to follow-up with podiatrist for evaluation of diabetic neuropathy\par \par Gynecology-advised to follow-up with gynecologist regarding routine exam and clinical breast exam.  Prescription given for bilateral mammogram as well as a bone density scan.\par \par Gastroenterology- screening for colon cancer advised patient to follow-up with gastroenterologist for screening colonoscopy.  Check FIT DNA testing just in case patient does not follow-up with recommendation\par \par Adult BMI screening and followup:  The patient's BMI is about 44 (morbid obesity). Counseled patient regarding BMI, healthy eating, portion control and exercise importance of diet to maintain a healthy weight. \par Counseled patient on importance of exercise to maintain a healthy weight \par \par RTO in 6 weeks \par Advised RTO for a Flu shot 9/21/22 \par \par I spent 31 Minutes with the patient, half of which we discussed finding on physical exam  and coordinated care.  As well as reviewed my plans and follow ups.

## 2022-08-15 NOTE — HEALTH RISK ASSESSMENT
[Intercurrent hospitalizations] : was admitted to the hospital  [Never] : Never [Yes] : Yes [Monthly or less (1 pt)] : Monthly or less (1 point) [1 or 2 (0 pts)] : 1 or 2 (0 points) [Never (0 pts)] : Never (0 points) [No] : In the past 12 months have you used drugs other than those required for medical reasons? No [No falls in past year] : Patient reported no falls in the past year [0] : 2) Feeling down, depressed, or hopeless: Not at all (0) [PHQ-2 Negative - No further assessment needed] : PHQ-2 Negative - No further assessment needed [de-identified] : [facility]  [de-identified] : Cardio, Endo (Dr VAN),  Podiatrist (Dr Mohan), Ophthalmologist (Dr. Feliz Garcia)  [de-identified] : very rare  [Audit-CScore] : 1 [de-identified] : none  [de-identified] : ADA, Low na diet  [[id number]] : 0

## 2022-08-15 NOTE — REVIEW OF SYSTEMS
[Negative] : Heme/Lymph [Fever] : no fever [Chills] : no chills [Fatigue] : no fatigue [Abdominal Pain] : no abdominal pain [Nausea] : no nausea [Vomiting] : no vomiting [Heartburn] : no heartburn [Anxiety] : no anxiety [Swollen Glands] : no swollen glands [FreeTextEntry1] : Elevated blood glucose levels

## 2022-08-15 NOTE — ASSESSMENT
[FreeTextEntry1] : Ms. LOPEZ is a 71 year  female, who present to the office for a follow up and fasting labs \par \par

## 2022-08-15 NOTE — HISTORY OF PRESENT ILLNESS
[FreeTextEntry1] : Fasting labs and a general checkup [de-identified] : Ms. LOPEZ is a 72 year  female, with a extensive past medical history as noted below who present to the office for general checkup. \par States she recently saw cardiology last week, was restarted back on torsemide for peripheral edema and CURTIS.  Since restarting the water pill she feels much better her  shortness of breath and lower extremity edema has improved.  Sates she did have a cardiac catheterization which was normal.  Overall generally she feels well.  Denies having any chest pain, shortness of breath, dyspnea on exertion, nausea or vomiting.  States she takes all her medication as prescribed without any side effects. \par States her last mammogram was over 4 years ago.  Does not recall when she had her last bone density scan.\par Recently saw her endocrinology is on Dexcom 6 monitor.  States her blood glucose has been elevated.  Did contact the endocrinologist who advised her to continue her current medication regimen.  Patient states she has been very good with her diet avoiding sugars and starches.  Tries to be as active as she can

## 2022-08-26 ENCOUNTER — LABORATORY RESULT (OUTPATIENT)
Age: 72
End: 2022-08-26

## 2022-08-28 ENCOUNTER — LABORATORY RESULT (OUTPATIENT)
Age: 72
End: 2022-08-28

## 2022-09-15 ENCOUNTER — APPOINTMENT (OUTPATIENT)
Dept: CARDIOLOGY | Facility: CLINIC | Age: 72
End: 2022-09-15

## 2022-09-15 ENCOUNTER — NON-APPOINTMENT (OUTPATIENT)
Age: 72
End: 2022-09-15

## 2022-09-15 VITALS
OXYGEN SATURATION: 96 % | HEART RATE: 67 BPM | SYSTOLIC BLOOD PRESSURE: 114 MMHG | WEIGHT: 293 LBS | HEIGHT: 71 IN | BODY MASS INDEX: 41.02 KG/M2 | DIASTOLIC BLOOD PRESSURE: 66 MMHG

## 2022-09-15 DIAGNOSIS — I42.8 OTHER CARDIOMYOPATHIES: ICD-10-CM

## 2022-09-15 PROCEDURE — 93000 ELECTROCARDIOGRAM COMPLETE: CPT

## 2022-09-15 PROCEDURE — 99214 OFFICE O/P EST MOD 30 MIN: CPT

## 2022-09-15 NOTE — DISCUSSION/SUMMARY
[FreeTextEntry1] : This is a 72 year old woman with a history of obesity, diabetes, hypertension, hyperlipidemia, and chronic lymphedema who presents to the office for a follow up visit. She was just admitted to  with increased dyspnea and decompensated heart failure. She was diuresed with IV Lasix.  She had an echocardiogram that was a technically difficult study that showed an EF of 35-40%.  This appears to be new.  SHe had a cath in [place] in 2013 that was normal. \par \par She is in no distress today..  She is more euvolemic on exam today ECG illustrates SR, LBBB, unchanged from previous.  Her blood pressure is controlled.\par Clinically she is doing much better since last visit.\par She will continue torsemide 20mg BID. She will continue GDMT for NICM including Toprol 100 QD,  Entresto 24/26 bid. She will continue eplerenone 25 QD. \par I will see her back again in 3 month.  She knows to call the office with any issues or worsening symptoms.

## 2022-09-15 NOTE — CARDIOLOGY SUMMARY
[de-identified] : Sr.  First degree AVB.  LBBB\par poor rwave progression  [de-identified] : 7/1322\par anterior.anteroseptal hypokinesis\par stage II diastolic dysfunction [de-identified] : 7/27/22\par LAD, LCX, RCA minor luminal irregularities\par NO AS, NO MR\par

## 2022-09-15 NOTE — PHYSICAL EXAM
[Well Developed] : well developed [Well Nourished] : well nourished [No Acute Distress] : no acute distress [Normal Conjunctiva] : normal conjunctiva [Normal Venous Pressure] : normal venous pressure [No Carotid Bruit] : no carotid bruit [Normal S1, S2] : normal S1, S2 [No Rub] : no rub [No Gallop] : no gallop [Rhythm Regular] : regular [Normal S1] : normal S1 [Normal S2] : normal S2 [II] : a grade 2 [Clear Lung Fields] : clear lung fields [Good Air Entry] : good air entry [No Respiratory Distress] : no respiratory distress  [Soft] : abdomen soft [Non Tender] : non-tender [No Masses/organomegaly] : no masses/organomegaly [Normal Bowel Sounds] : normal bowel sounds [Normal Gait] : normal gait [No Cyanosis] : no cyanosis [No Clubbing] : no clubbing [No Varicosities] : no varicosities [No Rash] : no rash [No Skin Lesions] : no skin lesions [Moves all extremities] : moves all extremities [No Focal Deficits] : no focal deficits [Normal Speech] : normal speech [Alert and Oriented] : alert and oriented [Normal memory] : normal memory [No Pitting Edema] : no pitting edema present [Right Carotid Bruit] : no bruit heard over the right carotid [Left Carotid Bruit] : no bruit heard over the left carotid [de-identified] : b/l chronic lymphedema

## 2022-09-15 NOTE — HISTORY OF PRESENT ILLNESS
[FreeTextEntry1] : This is a 71 year old woman with a history of obesity, diabetes, hypertension, hyperlipidemia, and chronic lymphedema who presents to the office for a follow up visit. She was just admitted to  with increased dyspnea and decompensated heart failure. She was diuresed with IV Lasix.  She had an echocardiogram that was a technically difficult study that showed an EF of 35-40%.  Review of old records shows that this is new.  I have been titrating GDMT on her.  SHe is tolerating Entresto.  She had a cath at at [facility] in 2013 that was normal.  She reports that her CURTIS and LE edema are both improved.  NO palpitations, dizziness, or syncope.  She is taking all of her medications as prescribed.  Her repeat echocardiogram showed moderate AS and segmental wall motion abnormalities with LAD distribution hypokinesis.  \par \par Ms Chowdhury arrives today 8/12/22 s/p angiogram on 7/27/22.\par \par Angiogram resulting Minor luminal irregularities , no occlusive disease.  NO AS or MR.\par \par She has not been feeling so well the past few days.  she has noticed increased dyspnea with exertion.  Difficulties laying flat.  She slept in the chair last night due to shortness of breath.  She also has developed a cough. \par Her weight is up since discharge about 10 pounds.\par \par She denies chest pains or pressure. She  denies dizzy spells, feeling faint or fainting, She   denies palpitations.\par \par she has been compliant with medications. \par \par \par Previous visit History as above:\par Ms Chowdhury arrives today feeling significantly better since last visit.\par She is tolerating diuresis with Torsemide 20mg twice a day.  Denies dizzy spells.\par She has been able to ambulate around her home better however she is still abit unstable and uses a motorized whell chair when out side.

## 2022-09-15 NOTE — REVIEW OF SYSTEMS
[Dyspnea on exertion] : dyspnea during exertion [Lower Ext Edema] : lower extremity edema [Orthopnea] : orthopnea [PND] : PND [Negative] : Heme/Lymph [SOB] : no shortness of breath [FreeTextEntry5] : see HPI- improved symptoms.

## 2022-10-31 ENCOUNTER — RX RENEWAL (OUTPATIENT)
Age: 72
End: 2022-10-31

## 2022-11-02 ENCOUNTER — RX RENEWAL (OUTPATIENT)
Age: 72
End: 2022-11-02

## 2022-11-15 ENCOUNTER — APPOINTMENT (OUTPATIENT)
Dept: INTERNAL MEDICINE | Facility: CLINIC | Age: 72
End: 2022-11-15

## 2022-11-15 VITALS — WEIGHT: 293 LBS | BODY MASS INDEX: 43.38 KG/M2

## 2022-11-15 VITALS
HEIGHT: 71 IN | SYSTOLIC BLOOD PRESSURE: 128 MMHG | HEART RATE: 70 BPM | OXYGEN SATURATION: 97 % | RESPIRATION RATE: 16 BRPM | DIASTOLIC BLOOD PRESSURE: 78 MMHG | TEMPERATURE: 97.2 F

## 2022-11-15 DIAGNOSIS — R82.90 UNSPECIFIED ABNORMAL FINDINGS IN URINE: ICD-10-CM

## 2022-11-15 LAB
BILIRUB UR QL STRIP: NEGATIVE
CLARITY UR: CLEAR
COLLECTION METHOD: NORMAL
GLUCOSE UR-MCNC: NEGATIVE
HCG UR QL: 0.2 EU/DL
HGB UR QL STRIP.AUTO: ABNORMAL
KETONES UR-MCNC: NEGATIVE
LEUKOCYTE ESTERASE UR QL STRIP: ABNORMAL
NITRITE UR QL STRIP: ABNORMAL
PH UR STRIP: 6.5
PROT UR STRIP-MCNC: NEGATIVE
SP GR UR STRIP: 1.01

## 2022-11-15 PROCEDURE — 99214 OFFICE O/P EST MOD 30 MIN: CPT | Mod: 25

## 2022-11-15 PROCEDURE — 81003 URINALYSIS AUTO W/O SCOPE: CPT | Mod: QW

## 2022-11-15 PROCEDURE — 36415 COLL VENOUS BLD VENIPUNCTURE: CPT

## 2022-11-15 RX ORDER — METOPROLOL SUCCINATE 50 MG/1
50 TABLET, EXTENDED RELEASE ORAL
Qty: 90 | Refills: 0 | Status: COMPLETED | COMMUNITY
Start: 2022-09-08 | End: 2022-11-15

## 2022-11-15 RX ORDER — BLOOD SUGAR DIAGNOSTIC
STRIP MISCELLANEOUS
Qty: 400 | Refills: 0 | Status: ACTIVE | COMMUNITY
Start: 2022-01-27

## 2022-11-15 RX ORDER — LEVOTHYROXINE SODIUM 0.03 MG/1
25 TABLET ORAL DAILY
Qty: 90 | Refills: 0 | Status: DISCONTINUED | COMMUNITY
Start: 2022-05-09 | End: 2022-11-15

## 2022-11-15 NOTE — PLAN
[FreeTextEntry1] : Cardiology - hx  congestive heart failure   continue with torsemide 20 mg 1 tab twice a day.  Monitor renal function and electrolytes.  Advised patient to follow-up with cardiology as directed.  Advised patient low-sodium diet.  Activities as tolerated. \par Continue wearing TR stockings.  Elevate legs as needed.  Consider lymphedema clinic\par Check proBNP.\par Continue with Entresto. \par \par Hyperlipidemia -   Advised low fat cholesterol diet.  Advised importance of having low cholesterol / LDL/ triglycerides. Advised life style modifications.  Continue with current medications.  Check FLP/LFT.\par Goal LDL less than70 \par \par Endocrinology- diabetes-    controlled A1c is 6.9.  Continue to monitor blood glucose via Dexcom 6 advised to call her endocrinologist and discuss the elevated readings.  Continue with current medication regimen.  Advised American diabetic diet 1600 - 1800  lewis.  Low-sodium low-fat low-cholesterol.  Check comprehensive metabolic with a hemoglobin A1c urine microalbumin.\par Advised to follow-up with ophthalmology for diabetic eye exam.\par Advised to follow-up with podiatrist for evaluation of diabetic neuropathy\par \par Adult BMI screening and followup:  The patient's BMI is about 43. Counseled patient regarding BMI, healthy eating, portion control and exercise importance of diet to maintain a healthy weight. \par Counseled patient on importance of exercise to maintain a healthy weight.\par Advise short-term cause of weight loss.\par \par Gynecology-advised to follow-up with gynecologist regarding routine exam and clinical breast exam.  Prescription given for bilateral mammogram as well as a bone density scan.  encourage patient to complete the mammogram and bone density scan.\par \par Gastroenterology- screening for colon cancer advised patient to follow-up with gastroenterologist for screening colonoscopy.  \par \par Adult BMI screening and followup:  The patient's BMI is about 44 (morbid obesity). Counseled patient regarding BMI, healthy eating, portion control and exercise importance of diet to maintain a healthy weight. \par Counseled patient on importance of exercise to maintain a healthy weight \par \par I spent 30 Minutes with the patient, half of which we discussed finding on physical exam  and coordinated care.  As well as reviewed my plans and follow ups.

## 2022-11-15 NOTE — PHYSICAL EXAM
[No Acute Distress] : no acute distress [Well Nourished] : well nourished [Well Developed] : well developed [Well-Appearing] : well-appearing [Normal Sclera/Conjunctiva] : normal sclera/conjunctiva [PERRL] : pupils equal round and reactive to light [EOMI] : extraocular movements intact [Normal Outer Ear/Nose] : the outer ears and nose were normal in appearance [Normal Oropharynx] : the oropharynx was normal [Normal TMs] : both tympanic membranes were normal [No JVD] : no jugular venous distention [No Lymphadenopathy] : no lymphadenopathy [Supple] : supple [Thyroid Normal, No Nodules] : the thyroid was normal and there were no nodules present [No Respiratory Distress] : no respiratory distress  [No Accessory Muscle Use] : no accessory muscle use [Clear to Auscultation] : lungs were clear to auscultation bilaterally [Normal Rate] : normal rate  [Regular Rhythm] : with a regular rhythm [Normal S1, S2] : normal S1 and S2 [No Carotid Bruits] : no carotid bruits [No Abdominal Bruit] : a ~M bruit was not heard ~T in the abdomen [Pedal Pulses Present] : the pedal pulses are present [No Palpable Aorta] : no palpable aorta [No Extremity Clubbing/Cyanosis] : no extremity clubbing/cyanosis [Soft] : abdomen soft [Non Tender] : non-tender [Non-distended] : non-distended [No Masses] : no abdominal mass palpated [No HSM] : no HSM [Normal Bowel Sounds] : normal bowel sounds [Normal Posterior Cervical Nodes] : no posterior cervical lymphadenopathy [Normal Anterior Cervical Nodes] : no anterior cervical lymphadenopathy [No CVA Tenderness] : no CVA  tenderness [No Spinal Tenderness] : no spinal tenderness [No Joint Swelling] : no joint swelling [Grossly Normal Strength/Tone] : grossly normal strength/tone [Coordination Grossly Intact] : coordination grossly intact [No Focal Deficits] : no focal deficits [Normal Gait] : normal gait [Deep Tendon Reflexes (DTR)] : deep tendon reflexes were 2+ and symmetric [Speech Grossly Normal] : speech grossly normal [Normal Affect] : the affect was normal [Alert and Oriented x3] : oriented to person, place, and time [Normal Mood] : the mood was normal [Normal Insight/Judgement] : insight and judgment were intact [de-identified] : weight ? pt had issues with hold balnace on the scale  estimated to be 311  [de-identified] : with murmur  [de-identified] : wearing teds stocking   Left  leg 2-3 plus edema  [de-identified] : obese  [de-identified] : advised the need to see GYN  [de-identified] : Psoriatic patches noted back abdomen trunk

## 2022-11-15 NOTE — COUNSELING
[Potential consequences of obesity discussed] : Potential consequences of obesity discussed [Benefits of weight loss discussed] : Benefits of weight loss discussed [Encouraged to use exercise tracking device] : Encouraged to use exercise tracking device [Weigh Self Weekly] : weigh self weekly [Decrease Portions] : decrease portions [Good understanding] : Patient has a good understanding of disease, goals and obesity follow-up plan [Yes] : Risk of tobacco use and health benefits of smoking cessation discussed: Yes [FreeTextEntry2] : ada diet 1800

## 2022-11-15 NOTE — DATA REVIEWED
[FreeTextEntry1] : reviewed labs 08/26/22\par cbc was normal \par pro bnp 5276 \par tsh 13.8 \par ldl 54 chol 106\par blood glucose 154  BUN 31 gfr 49 \par a1c 6.9 on insulin \par \par \par today abnormal u/a will send out for culture

## 2022-11-15 NOTE — ASSESSMENT
[FreeTextEntry1] : Ms. LOPEZ is a 72 year  female, who present to the office for a follow up and fasting labs.\par  requesting renewal of levothyroxine 25 mcg daily\par \par

## 2022-11-15 NOTE — HEALTH RISK ASSESSMENT
[Intercurrent hospitalizations] : was admitted to the hospital  [Yes] : Yes [Monthly or less (1 pt)] : Monthly or less (1 point) [1 or 2 (0 pts)] : 1 or 2 (0 points) [Never (0 pts)] : Never (0 points) [No] : In the past 12 months have you used drugs other than those required for medical reasons? No [No falls in past year] : Patient reported no falls in the past year [0] : 2) Feeling down, depressed, or hopeless: Not at all (0) [PHQ-2 Negative - No further assessment needed] : PHQ-2 Negative - No further assessment needed [Never] : Never [de-identified] : [facility]  [de-identified] : Cardio, Endo (Dr VAN),  Podiatrist (Dr Mohan), Ophthalmologist (Dr. Feliz Garcia)  [de-identified] : very rare  [Audit-CScore] : 1 [de-identified] : none  [de-identified] : ADA, Low na diet  [YWA1Pvhic] : 0

## 2022-11-15 NOTE — REVIEW OF SYSTEMS
[Negative] : Heme/Lymph [Recent Change In Weight] : ~T recent weight change [Lower Ext Edema] : lower extremity edema [Skin Rash] : skin rash [Fever] : no fever [Chills] : no chills [Fatigue] : no fatigue [Pain] : no pain [Vision Problems] : no vision problems [Earache] : no earache [Hoarseness] : no hoarseness [Sore Throat] : no sore throat [Chest Pain] : no chest pain [Leg Claudication] : no leg claudication [Paroxysmal Nocturnal Dyspnea] : no paroxysmal nocturnal dyspnea [Shortness Of Breath] : no shortness of breath [Wheezing] : no wheezing [Cough] : no cough [Abdominal Pain] : no abdominal pain [Nausea] : no nausea [Diarrhea] : diarrhea [Vomiting] : no vomiting [Heartburn] : no heartburn [Itching] : no itching [Mole Changes] : no mole changes [Nail Changes] : no nail changes [Hair Changes] : no hair changes [Anxiety] : no anxiety [Swollen Glands] : no swollen glands [FreeTextEntry2] : weight loss  [FreeTextEntry1] : Elevated blood glucose levels

## 2022-11-15 NOTE — HISTORY OF PRESENT ILLNESS
[FreeTextEntry1] : Fasting labs, medication renewal. [de-identified] : Mrs. LOPEZ is a 72 year  female, with a past medical history as noted below,who present to the office  today for   Fasting labs and medication renewal.  Patient states she generally feels well.  Denies having any chest pain, shortness of breath, dyspnea on exertion, nausea or vomiting.  Denies any hyper or hypoglycemic events.  Has a follow-up with  cardiology next month.  \par  patient states she is taking all her medication.    States she needs a refill on levothyroxine 25 mcg.  Never took the 75 mg of levothyroxine.\par Patient states recently she had her flu shot done at [facility] pharmacy.\par Patient denies going for her mammogram as discussed at last visit\par \par

## 2022-11-17 LAB
25(OH)D3 SERPL-MCNC: 45.9 NG/ML
ALBUMIN SERPL ELPH-MCNC: 4.1 G/DL
ALP BLD-CCNC: 99 U/L
ALT SERPL-CCNC: 23 U/L
ANION GAP SERPL CALC-SCNC: 11 MMOL/L
AST SERPL-CCNC: 28 U/L
BASOPHILS # BLD AUTO: 0.12 K/UL
BASOPHILS NFR BLD AUTO: 1.4 %
BILIRUB SERPL-MCNC: 0.6 MG/DL
BUN SERPL-MCNC: 38 MG/DL
CALCIUM SERPL-MCNC: 9.7 MG/DL
CHLORIDE SERPL-SCNC: 98 MMOL/L
CHOLEST SERPL-MCNC: 108 MG/DL
CO2 SERPL-SCNC: 30 MMOL/L
CREAT SERPL-MCNC: 1.3 MG/DL
CREAT SPEC-SCNC: 30 MG/DL
EGFR: 44 ML/MIN/1.73M2
EOSINOPHIL # BLD AUTO: 0.3 K/UL
EOSINOPHIL NFR BLD AUTO: 3.5 %
ESTIMATED AVERAGE GLUCOSE: 180 MG/DL
GLUCOSE SERPL-MCNC: 112 MG/DL
HBA1C MFR BLD HPLC: 7.9 %
HCT VFR BLD CALC: 43.4 %
HDLC SERPL-MCNC: 48 MG/DL
HGB BLD-MCNC: 13.5 G/DL
IMM GRANULOCYTES NFR BLD AUTO: 0.3 %
LDLC SERPL CALC-MCNC: 47 MG/DL
LYMPHOCYTES # BLD AUTO: 1.17 K/UL
LYMPHOCYTES NFR BLD AUTO: 13.5 %
MAN DIFF?: NORMAL
MCHC RBC-ENTMCNC: 29.6 PG
MCHC RBC-ENTMCNC: 31.1 GM/DL
MCV RBC AUTO: 95.2 FL
MICROALBUMIN 24H UR DL<=1MG/L-MCNC: 1.3 MG/DL
MICROALBUMIN/CREAT 24H UR-RTO: 42 MG/G
MONOCYTES # BLD AUTO: 0.68 K/UL
MONOCYTES NFR BLD AUTO: 7.8 %
NEUTROPHILS # BLD AUTO: 6.37 K/UL
NEUTROPHILS NFR BLD AUTO: 73.5 %
NONHDLC SERPL-MCNC: 59 MG/DL
NT-PROBNP SERPL-MCNC: 3154 PG/ML
PLATELET # BLD AUTO: 262 K/UL
POTASSIUM SERPL-SCNC: 4.6 MMOL/L
PROT SERPL-MCNC: 7.7 G/DL
RBC # BLD: 4.56 M/UL
RBC # FLD: 14.6 %
SODIUM SERPL-SCNC: 138 MMOL/L
T4 FREE SERPL-MCNC: 1.3 NG/DL
THYROGLOB AB SERPL-ACNC: <20 IU/ML
THYROPEROXIDASE AB SERPL IA-ACNC: 2801 IU/ML
TRIGL SERPL-MCNC: 61 MG/DL
TSH SERPL-ACNC: 11.9 UIU/ML
WBC # FLD AUTO: 8.67 K/UL

## 2022-11-18 ENCOUNTER — EMERGENCY (EMERGENCY)
Facility: HOSPITAL | Age: 72
LOS: 1 days | Discharge: HOME | End: 2022-11-18
Attending: EMERGENCY MEDICINE | Admitting: EMERGENCY MEDICINE
Payer: MEDICARE

## 2022-11-18 VITALS
SYSTOLIC BLOOD PRESSURE: 129 MMHG | DIASTOLIC BLOOD PRESSURE: 72 MMHG | RESPIRATION RATE: 18 BRPM | TEMPERATURE: 97 F | HEART RATE: 61 BPM | OXYGEN SATURATION: 99 %

## 2022-11-18 VITALS
RESPIRATION RATE: 16 BRPM | SYSTOLIC BLOOD PRESSURE: 128 MMHG | DIASTOLIC BLOOD PRESSURE: 63 MMHG | HEIGHT: 71 IN | TEMPERATURE: 97 F | HEART RATE: 66 BPM | WEIGHT: 293 LBS | OXYGEN SATURATION: 98 %

## 2022-11-18 DIAGNOSIS — Z86.69 PERSONAL HISTORY OF OTHER DISEASES OF THE NERVOUS SYSTEM AND SENSE ORGANS: Chronic | ICD-10-CM

## 2022-11-18 DIAGNOSIS — S02.91XA UNSPECIFIED FRACTURE OF SKULL, INITIAL ENCOUNTER FOR CLOSED FRACTURE: Chronic | ICD-10-CM

## 2022-11-18 DIAGNOSIS — Z98.890 OTHER SPECIFIED POSTPROCEDURAL STATES: Chronic | ICD-10-CM

## 2022-11-18 DIAGNOSIS — M75.00 ADHESIVE CAPSULITIS OF UNSPECIFIED SHOULDER: Chronic | ICD-10-CM

## 2022-11-18 LAB
ALBUMIN SERPL ELPH-MCNC: 3.4 G/DL — SIGNIFICANT CHANGE UP (ref 3.3–5)
ALP SERPL-CCNC: 105 U/L — SIGNIFICANT CHANGE UP (ref 40–120)
ALT FLD-CCNC: 32 U/L — SIGNIFICANT CHANGE UP (ref 12–78)
ANION GAP SERPL CALC-SCNC: 5 MMOL/L — SIGNIFICANT CHANGE UP (ref 5–17)
AST SERPL-CCNC: 29 U/L — SIGNIFICANT CHANGE UP (ref 15–37)
BASOPHILS # BLD AUTO: 0.08 K/UL — SIGNIFICANT CHANGE UP (ref 0–0.2)
BASOPHILS NFR BLD AUTO: 1 % — SIGNIFICANT CHANGE UP (ref 0–2)
BILIRUB SERPL-MCNC: 0.3 MG/DL — SIGNIFICANT CHANGE UP (ref 0.2–1.2)
BUN SERPL-MCNC: 50 MG/DL — HIGH (ref 7–23)
CALCIUM SERPL-MCNC: 8.9 MG/DL — SIGNIFICANT CHANGE UP (ref 8.5–10.1)
CHLORIDE SERPL-SCNC: 102 MMOL/L — SIGNIFICANT CHANGE UP (ref 96–108)
CO2 SERPL-SCNC: 31 MMOL/L — SIGNIFICANT CHANGE UP (ref 22–31)
CREAT SERPL-MCNC: 1.4 MG/DL — HIGH (ref 0.5–1.3)
EGFR: 40 ML/MIN/1.73M2 — LOW
EOSINOPHIL # BLD AUTO: 0.3 K/UL — SIGNIFICANT CHANGE UP (ref 0–0.5)
EOSINOPHIL NFR BLD AUTO: 3.7 % — SIGNIFICANT CHANGE UP (ref 0–6)
GLUCOSE SERPL-MCNC: 60 MG/DL — LOW (ref 70–99)
HCT VFR BLD CALC: 39.9 % — SIGNIFICANT CHANGE UP (ref 34.5–45)
HGB BLD-MCNC: 12.9 G/DL — SIGNIFICANT CHANGE UP (ref 11.5–15.5)
IMM GRANULOCYTES NFR BLD AUTO: 0.2 % — SIGNIFICANT CHANGE UP (ref 0–0.9)
LYMPHOCYTES # BLD AUTO: 1.47 K/UL — SIGNIFICANT CHANGE UP (ref 1–3.3)
LYMPHOCYTES # BLD AUTO: 18.2 % — SIGNIFICANT CHANGE UP (ref 13–44)
MCHC RBC-ENTMCNC: 29.3 PG — SIGNIFICANT CHANGE UP (ref 27–34)
MCHC RBC-ENTMCNC: 32.3 GM/DL — SIGNIFICANT CHANGE UP (ref 32–36)
MCV RBC AUTO: 90.7 FL — SIGNIFICANT CHANGE UP (ref 80–100)
MONOCYTES # BLD AUTO: 0.63 K/UL — SIGNIFICANT CHANGE UP (ref 0–0.9)
MONOCYTES NFR BLD AUTO: 7.8 % — SIGNIFICANT CHANGE UP (ref 2–14)
NEUTROPHILS # BLD AUTO: 5.56 K/UL — SIGNIFICANT CHANGE UP (ref 1.8–7.4)
NEUTROPHILS NFR BLD AUTO: 69.1 % — SIGNIFICANT CHANGE UP (ref 43–77)
NRBC # BLD: 0 /100 WBCS — SIGNIFICANT CHANGE UP (ref 0–0)
PLATELET # BLD AUTO: 232 K/UL — SIGNIFICANT CHANGE UP (ref 150–400)
POTASSIUM SERPL-MCNC: 3.6 MMOL/L — SIGNIFICANT CHANGE UP (ref 3.5–5.3)
POTASSIUM SERPL-SCNC: 3.6 MMOL/L — SIGNIFICANT CHANGE UP (ref 3.5–5.3)
PROT SERPL-MCNC: 8 G/DL — SIGNIFICANT CHANGE UP (ref 6–8.3)
RBC # BLD: 4.4 M/UL — SIGNIFICANT CHANGE UP (ref 3.8–5.2)
RBC # FLD: 14.1 % — SIGNIFICANT CHANGE UP (ref 10.3–14.5)
SODIUM SERPL-SCNC: 138 MMOL/L — SIGNIFICANT CHANGE UP (ref 135–145)
WBC # BLD: 8.06 K/UL — SIGNIFICANT CHANGE UP (ref 3.8–10.5)
WBC # FLD AUTO: 8.06 K/UL — SIGNIFICANT CHANGE UP (ref 3.8–10.5)

## 2022-11-18 PROCEDURE — 85025 COMPLETE CBC W/AUTO DIFF WBC: CPT

## 2022-11-18 PROCEDURE — 99285 EMERGENCY DEPT VISIT HI MDM: CPT

## 2022-11-18 PROCEDURE — 80053 COMPREHEN METABOLIC PANEL: CPT

## 2022-11-18 PROCEDURE — 82962 GLUCOSE BLOOD TEST: CPT

## 2022-11-18 PROCEDURE — 99284 EMERGENCY DEPT VISIT MOD MDM: CPT

## 2022-11-18 PROCEDURE — 36415 COLL VENOUS BLD VENIPUNCTURE: CPT

## 2022-11-18 NOTE — ED PROVIDER NOTE - NSFOLLOWUPINSTRUCTIONS_ED_ALL_ED_FT
Hypoglycemia      Hypoglycemia occurs when the level of sugar (glucose) in the blood is too low. Hypoglycemia can happen in people who have or do not have diabetes. It can develop quickly, and it can be a medical emergency. For most people, a blood glucose level below 70 mg/dL (3.9 mmol/L) is considered hypoglycemia.    Glucose is a type of sugar that provides the body's main source of energy. Certain hormones (insulin and glucagon) control the level of glucose in the blood. Insulin lowers blood glucose, and glucagon raises blood glucose. Hypoglycemia can result from having too much insulin in the bloodstream, or from not eating enough food that contains glucose. You may also have reactive hypoglycemia, which happens within 4 hours after eating a meal.      What are the causes?    Hypoglycemia occurs most often in people who have diabetes and may be caused by:  •Diabetes medicine.      •Not eating enough, or not eating often enough.      •Increased physical activity.      •Drinking alcohol on an empty stomach.      If you do not have diabetes, hypoglycemia may be caused by:  •A tumor in the pancreas.      •Not eating enough, or not eating for long periods at a time (fasting).      •A severe infection or illness.      •Problems after having bariatric surgery.      •Organ failure, such as kidney or liver failure.      •Certain medicines.        What increases the risk?    Hypoglycemia is more likely to develop in people who:  •Have diabetes and take medicines to lower blood glucose.      •Abuse alcohol.      •Have a severe illness.        What are the signs or symptoms?    Symptoms vary depending on whether the condition is mild, moderate, or severe.    Mild hypoglycemia     •Hunger.      •Sweating and feeling clammy.      •Dizziness or feeling light-headed.      •Sleepiness or restless sleep.      •Nausea.      •Increased heart rate.      •Headache.      •Blurry vision.      •Mood changes, such as irritability or anxiety.      •Tingling or numbness around the mouth, lips, or tongue.      Moderate hypoglycemia     •Confusion and poor judgment.      •Behavior changes.      •Weakness.      •Irregular heartbeat.      •A change in coordination.      Severe hypoglycemia     Severe hypoglycemia is a medical emergency. It can cause:  •Fainting.      •Seizures.      •Loss of consciousness (coma).      •Death.        How is this diagnosed?    Hypoglycemia is diagnosed with a blood test to measure your blood glucose level. This blood test is done while you are having symptoms. Your health care provider may also do a physical exam and review your medical history.      How is this treated?    This condition can be treated by immediately eating or drinking something that contains sugar with 15 grams of fast-acting carbohydrate, such as:  •4 oz (120 mL) of fruit juice.      •4 oz (120 mL) of regular soda (not diet soda).      •Several pieces of hard candy. Check food labels to find out how many pieces to eat for 15 grams.      •1 Tbsp (15 mL) of sugar or honey.      •4 glucose tablets.      •1 tube of glucose gel.        Treating hypoglycemia if you have diabetes  Hands showing right hand using lancet pen on left ring finger, with glucometer in background.  If you are alert and able to swallow safely, follow the 15:15 rule:•Take 15 grams of a fast-acting carbohydrate. Talk with your health care provider about how much you should take. Options for getting 15 grams of fast-acting carbohydrate include:  •Glucose tablets (take 4 tablets).      •Several pieces of hard candy. Check food labels to find out how many pieces to eat for 15 grams.      •4 oz (120 mL) of fruit juice.      •4 oz (120 mL) of regular soda (not diet soda).      •1 Tbsp (15 mL) of sugar or honey.      •1 tube of glucose gel.        •Check your blood glucose 15 minutes after you take the carbohydrate.      •If the repeat blood glucose level is still at or below 70 mg/dL (3.9 mmol/L), take 15 grams of a carbohydrate again.      •If your blood glucose level does not increase above 70 mg/dL (3.9 mmol/L) after 3 tries, seek emergency medical care.      •After your blood glucose level returns to normal, eat a meal or a snack within 1 hour.      Treating severe hypoglycemia    Severe hypoglycemia is when your blood glucose level is below 54 mg/dL (3 mmol/L). Severe hypoglycemia is a medical emergency. Get medical help right away.    If you have severe hypoglycemia and you cannot eat or drink, you will need to be given glucagon. A family member or close friend should learn how to check your blood glucose and how to give you glucagon. Ask your health care provider if you need to have an emergency glucagon kit available.    Severe hypoglycemia may need to be treated in a hospital. The treatment may include getting glucose through an IV. You may also need treatment for the cause of your hypoglycemia.      Follow these instructions at home:  Medical alert bracelet.   General instructions     •Take over-the-counter and prescription medicines only as told by your health care provider.      •Monitor your blood glucose as told by your health care provider.    •If you drink alcohol:•Limit how much you have to:  •0–1 drink a day for women who are not pregnant.      •0–2 drinks a day for men.         •Know how much alcohol is in your drink. In the [place] one drink equals one 12 oz bottle of beer (355 mL), one 5 oz glass of wine (148 mL), or one 1½ oz glass of hard liquor (44 mL).      •Be sure to eat food along with drinking alcohol.      •Be aware that alcohol is absorbed quickly and may have lingering effects that may result in hypoglycemia later. Be sure to do ongoing glucose monitoring.        •Keep all follow-up visits. This is important.      If you have diabetes:     •Always have a fast-acting carbohydrate (15 grams) option with you to treat low blood glucose.    •Follow your diabetes management plan as directed by your health care provider. Make sure you:  •Know the symptoms of hypoglycemia. It is important to treat it right away to prevent it from becoming severe.      •Check your blood glucose as often as told. Always check before and after exercise.      •Always check your blood glucose before you drive a motorized vehicle.      •Take your medicines as told.      •Follow your meal plan. Eat on time, and do not skip meals.        •Share your diabetes management plan with people in your workplace, school, and household.      •Carry a medical alert card or wear medical alert jewelry.        Where to find more information    •American Diabetes Association: www.diabetes.org        Contact a health care provider if:    •You have problems keeping your blood glucose in your target range.      •You have frequent episodes of hypoglycemia.        Get help right away if:    •You continue to have hypoglycemia symptoms after eating or drinking something that contains 15 grams of fast-acting carbohydrate, and you cannot get your blood glucose above 70 mg/dL (3.9 mmol/L) while following the 15:15 rule.      •Your blood glucose is below 54 mg/dL (3 mmol/L).      •You have a seizure.      •You faint.      These symptoms may represent a serious problem that is an emergency. Do not wait to see if the symptoms will go away. Get medical help right away. Call your local emergency services (911 in the [place] Do not drive yourself to the hospital.       Summary    •Hypoglycemia occurs when the level of sugar (glucose) in the blood is too low.      •Hypoglycemia can happen in people who have or do not have diabetes. It can develop quickly, and it can be a medical emergency.      •Make sure you know the symptoms of hypoglycemia and how to treat it.      •Always have a fast-acting carbohydrate option with you to treat low blood sugar.      This information is not intended to replace advice given to you by your health care provider. Make sure you discuss any questions you have with your health care provider.      Document Revised: 11/18/2021 Document Reviewed: 11/18/2021    Elsevier Patient Education © 2022 [employer]

## 2022-11-18 NOTE — ED ADULT NURSE NOTE - OBJECTIVE STATEMENT
Pt took the wrong diabetic medication on accident this evening and she wants to be observed for hypoglycemia. Pt took 35u on novolog instead of 35u lantus. Pt has a dexcom and it is reading 130. blood sugar was checked with hospital monitor and it was reading 90. Pt was given a regular gingerale. Pt denies dizziness, sweating, change in vision, tremors.

## 2022-11-18 NOTE — ED PROVIDER NOTE - PROGRESS NOTE DETAILS
Spoke with our pharmacist regarding clearance of novolog. States it will take ~4 hrs for novolog to clear. Pt remains stable. Will continue to monitor. Pt blood glucose stabilized.  Pt has no complaints. Hypoglycemia precautions advised.

## 2022-11-18 NOTE — ED PROVIDER NOTE - OBJECTIVE STATEMENT
72-year-old female with a history of diabetes, hypertension, CHF, lymphedema presents stating that she thinks she accidentally took 35 units of NovoLog instead of 35 units of Lantus.  Patient states she has a drop in out of a vial and took the medication and then was not sure which one that she might of taken.  Patient states she took insulin at 11:30 PM last night.  Her last meal was about 5:30 PM.  After coming to this realization she then had some ice cream some sugar water.  Patient reports she was checking her fingersticks and her Dexcom is off by 20-30 points so its not accurate.  States she was nervous that if she falls asleep she might become hypoglycemic.  At this time patient has no complaints.

## 2022-11-18 NOTE — ED PROVIDER NOTE - CLINICAL SUMMARY MEDICAL DECISION MAKING FREE TEXT BOX
72-year-old female who is presenting with an accidental NovoLog overdose.  Will monitor fingersticks Q1 hour.  We will give some food for patient to eat.  Check basic labs and observe.

## 2022-11-18 NOTE — ED PROVIDER NOTE - PATIENT PORTAL LINK FT
You can access the FollowMyHealth Patient Portal offered by [facility] by registering at the following website: [email or web address]. By joining [email or web address] FollowMyHealth portal, you will also be able to view your health information using other applications (apps) compatible with our system.

## 2022-11-22 ENCOUNTER — NON-APPOINTMENT (OUTPATIENT)
Age: 72
End: 2022-11-22

## 2022-11-22 LAB — BACTERIA UR CULT: ABNORMAL

## 2022-12-06 ENCOUNTER — APPOINTMENT (OUTPATIENT)
Dept: CARDIOLOGY | Facility: CLINIC | Age: 72
End: 2022-12-06

## 2022-12-06 VITALS
SYSTOLIC BLOOD PRESSURE: 161 MMHG | BODY MASS INDEX: 41.02 KG/M2 | OXYGEN SATURATION: 96 % | HEART RATE: 62 BPM | HEIGHT: 71 IN | WEIGHT: 293 LBS | DIASTOLIC BLOOD PRESSURE: 72 MMHG

## 2022-12-06 VITALS — SYSTOLIC BLOOD PRESSURE: 122 MMHG | DIASTOLIC BLOOD PRESSURE: 80 MMHG

## 2022-12-06 PROCEDURE — 93000 ELECTROCARDIOGRAM COMPLETE: CPT

## 2022-12-06 PROCEDURE — 99214 OFFICE O/P EST MOD 30 MIN: CPT

## 2022-12-09 ENCOUNTER — LABORATORY RESULT (OUTPATIENT)
Age: 72
End: 2022-12-09

## 2022-12-09 NOTE — PRE-OP CHECKLIST - BOWEL PREP
Rx Refill Note    Requested Prescriptions     Pending Prescriptions Disp Refills   • carBAMazepine (TEGretol) 200 MG tablet [Pharmacy Med Name: CARBAMAZEPINE 200 MG TABLET] 60 tablet 0     Sig: TAKE ONE TABLET BY MOUTH 2 TIMES A DAY.        Last office visit with prescribing clinician: 11/28/2022      Next office visit with prescribing clinician: 5/30/2023   Last labs:   Last refill: 11/11/2022   Pharmacy (be sure to add in Epic). Correct    Dr. Sandy is off               n/a

## 2022-12-12 ENCOUNTER — NON-APPOINTMENT (OUTPATIENT)
Age: 72
End: 2022-12-12

## 2023-01-01 ENCOUNTER — APPOINTMENT (OUTPATIENT)
Dept: HYPERBARIC MEDICINE | Facility: HOSPITAL | Age: 73
End: 2023-01-01
Payer: MEDICARE

## 2023-01-01 ENCOUNTER — OUTPATIENT (OUTPATIENT)
Dept: OUTPATIENT SERVICES | Facility: HOSPITAL | Age: 73
LOS: 1 days | Discharge: HOME | End: 2023-01-01
Payer: MEDICARE

## 2023-01-01 ENCOUNTER — APPOINTMENT (OUTPATIENT)
Dept: INTERNAL MEDICINE | Facility: CLINIC | Age: 73
End: 2023-01-01

## 2023-01-01 ENCOUNTER — NON-APPOINTMENT (OUTPATIENT)
Age: 73
End: 2023-01-01

## 2023-01-01 ENCOUNTER — LABORATORY RESULT (OUTPATIENT)
Age: 73
End: 2023-01-01

## 2023-01-01 ENCOUNTER — APPOINTMENT (OUTPATIENT)
Dept: HYPERBARIC MEDICINE | Facility: HOSPITAL | Age: 73
End: 2023-01-01

## 2023-01-01 ENCOUNTER — APPOINTMENT (OUTPATIENT)
Dept: INTERNAL MEDICINE | Facility: CLINIC | Age: 73
End: 2023-01-01
Payer: MEDICARE

## 2023-01-01 ENCOUNTER — RESULT REVIEW (OUTPATIENT)
Age: 73
End: 2023-01-01

## 2023-01-01 ENCOUNTER — RX RENEWAL (OUTPATIENT)
Age: 73
End: 2023-01-01

## 2023-01-01 ENCOUNTER — APPOINTMENT (OUTPATIENT)
Dept: WOUND CARE | Facility: HOSPITAL | Age: 73
End: 2023-01-01
Payer: MEDICARE

## 2023-01-01 ENCOUNTER — OUTPATIENT (OUTPATIENT)
Dept: OUTPATIENT SERVICES | Facility: HOSPITAL | Age: 73
LOS: 1 days | Discharge: HOME | End: 2023-01-01

## 2023-01-01 ENCOUNTER — APPOINTMENT (OUTPATIENT)
Dept: CARDIOLOGY | Facility: CLINIC | Age: 73
End: 2023-01-01
Payer: MEDICARE

## 2023-01-01 ENCOUNTER — APPOINTMENT (OUTPATIENT)
Dept: INFECTIOUS DISEASE | Facility: CLINIC | Age: 73
End: 2023-01-01
Payer: MEDICARE

## 2023-01-01 ENCOUNTER — APPOINTMENT (OUTPATIENT)
Dept: MRI IMAGING | Facility: CLINIC | Age: 73
End: 2023-01-01

## 2023-01-01 ENCOUNTER — OUTPATIENT (OUTPATIENT)
Dept: OUTPATIENT SERVICES | Facility: HOSPITAL | Age: 73
LOS: 1 days | End: 2023-01-01
Payer: MEDICARE

## 2023-01-01 ENCOUNTER — INPATIENT (INPATIENT)
Facility: HOSPITAL | Age: 73
LOS: 3 days | Discharge: HOME | DRG: 872 | End: 2023-08-15
Attending: FAMILY MEDICINE | Admitting: FAMILY MEDICINE
Payer: MEDICARE

## 2023-01-01 ENCOUNTER — TRANSCRIPTION ENCOUNTER (OUTPATIENT)
Age: 73
End: 2023-01-01

## 2023-01-01 ENCOUNTER — APPOINTMENT (OUTPATIENT)
Dept: CARDIOLOGY | Facility: CLINIC | Age: 73
End: 2023-01-01

## 2023-01-01 VITALS
RESPIRATION RATE: 18 BRPM | HEART RATE: 56 BPM | SYSTOLIC BLOOD PRESSURE: 139 MMHG | DIASTOLIC BLOOD PRESSURE: 75 MMHG | TEMPERATURE: 98 F | OXYGEN SATURATION: 96 %

## 2023-01-01 VITALS
SYSTOLIC BLOOD PRESSURE: 122 MMHG | RESPIRATION RATE: 16 BRPM | OXYGEN SATURATION: 95 % | DIASTOLIC BLOOD PRESSURE: 73 MMHG | TEMPERATURE: 97.7 F | HEART RATE: 61 BPM

## 2023-01-01 VITALS
SYSTOLIC BLOOD PRESSURE: 152 MMHG | BODY MASS INDEX: 41.02 KG/M2 | OXYGEN SATURATION: 95 % | WEIGHT: 293 LBS | TEMPERATURE: 97.8 F | HEIGHT: 71 IN | HEART RATE: 56 BPM | RESPIRATION RATE: 18 BRPM | DIASTOLIC BLOOD PRESSURE: 73 MMHG

## 2023-01-01 VITALS
DIASTOLIC BLOOD PRESSURE: 79 MMHG | RESPIRATION RATE: 18 BRPM | TEMPERATURE: 97.9 F | SYSTOLIC BLOOD PRESSURE: 134 MMHG | HEART RATE: 62 BPM | OXYGEN SATURATION: 98 %

## 2023-01-01 VITALS
RESPIRATION RATE: 20 BRPM | SYSTOLIC BLOOD PRESSURE: 147 MMHG | OXYGEN SATURATION: 100 % | TEMPERATURE: 97.7 F | HEART RATE: 57 BPM | DIASTOLIC BLOOD PRESSURE: 71 MMHG

## 2023-01-01 VITALS
HEART RATE: 59 BPM | OXYGEN SATURATION: 95 % | RESPIRATION RATE: 18 BRPM | WEIGHT: 293 LBS | TEMPERATURE: 98.2 F | SYSTOLIC BLOOD PRESSURE: 136 MMHG | HEIGHT: 71 IN | BODY MASS INDEX: 41.02 KG/M2 | DIASTOLIC BLOOD PRESSURE: 73 MMHG

## 2023-01-01 VITALS
TEMPERATURE: 98.2 F | BODY MASS INDEX: 41.02 KG/M2 | HEART RATE: 58 BPM | OXYGEN SATURATION: 94 % | HEIGHT: 71 IN | DIASTOLIC BLOOD PRESSURE: 78 MMHG | SYSTOLIC BLOOD PRESSURE: 130 MMHG | WEIGHT: 293 LBS | RESPIRATION RATE: 14 BRPM

## 2023-01-01 VITALS
TEMPERATURE: 98.5 F | SYSTOLIC BLOOD PRESSURE: 134 MMHG | HEART RATE: 61 BPM | DIASTOLIC BLOOD PRESSURE: 56 MMHG | RESPIRATION RATE: 16 BRPM | OXYGEN SATURATION: 95 %

## 2023-01-01 VITALS
OXYGEN SATURATION: 98 % | RESPIRATION RATE: 18 BRPM | DIASTOLIC BLOOD PRESSURE: 94 MMHG | TEMPERATURE: 98.1 F | SYSTOLIC BLOOD PRESSURE: 128 MMHG | HEART RATE: 52 BPM

## 2023-01-01 VITALS
DIASTOLIC BLOOD PRESSURE: 74 MMHG | HEART RATE: 54 BPM | SYSTOLIC BLOOD PRESSURE: 119 MMHG | RESPIRATION RATE: 18 BRPM | TEMPERATURE: 97.5 F | OXYGEN SATURATION: 96 %

## 2023-01-01 VITALS
RESPIRATION RATE: 18 BRPM | OXYGEN SATURATION: 95 % | DIASTOLIC BLOOD PRESSURE: 67 MMHG | SYSTOLIC BLOOD PRESSURE: 153 MMHG | HEART RATE: 58 BPM | TEMPERATURE: 97.8 F

## 2023-01-01 VITALS
BODY MASS INDEX: 41.02 KG/M2 | WEIGHT: 293 LBS | HEART RATE: 58 BPM | HEIGHT: 71 IN | RESPIRATION RATE: 18 BRPM | SYSTOLIC BLOOD PRESSURE: 134 MMHG | DIASTOLIC BLOOD PRESSURE: 70 MMHG | OXYGEN SATURATION: 95 %

## 2023-01-01 VITALS
HEART RATE: 59 BPM | DIASTOLIC BLOOD PRESSURE: 71 MMHG | SYSTOLIC BLOOD PRESSURE: 155 MMHG | HEART RATE: 61 BPM | TEMPERATURE: 98 F | DIASTOLIC BLOOD PRESSURE: 49 MMHG | OXYGEN SATURATION: 95 % | OXYGEN SATURATION: 95 % | TEMPERATURE: 99 F | RESPIRATION RATE: 18 BRPM | SYSTOLIC BLOOD PRESSURE: 114 MMHG | RESPIRATION RATE: 16 BRPM

## 2023-01-01 VITALS
RESPIRATION RATE: 18 BRPM | SYSTOLIC BLOOD PRESSURE: 122 MMHG | DIASTOLIC BLOOD PRESSURE: 71 MMHG | TEMPERATURE: 98.2 F | HEART RATE: 55 BPM | OXYGEN SATURATION: 97 %

## 2023-01-01 VITALS
TEMPERATURE: 98.1 F | DIASTOLIC BLOOD PRESSURE: 77 MMHG | OXYGEN SATURATION: 97 % | RESPIRATION RATE: 18 BRPM | HEART RATE: 63 BPM | SYSTOLIC BLOOD PRESSURE: 138 MMHG

## 2023-01-01 VITALS
DIASTOLIC BLOOD PRESSURE: 76 MMHG | TEMPERATURE: 97.7 F | OXYGEN SATURATION: 96 % | HEART RATE: 56 BPM | DIASTOLIC BLOOD PRESSURE: 78 MMHG | SYSTOLIC BLOOD PRESSURE: 128 MMHG | RESPIRATION RATE: 20 BRPM | HEART RATE: 86 BPM | RESPIRATION RATE: 18 BRPM | OXYGEN SATURATION: 97 % | TEMPERATURE: 98.2 F | SYSTOLIC BLOOD PRESSURE: 144 MMHG

## 2023-01-01 VITALS
TEMPERATURE: 97.5 F | RESPIRATION RATE: 18 BRPM | HEART RATE: 54 BPM | HEART RATE: 67 BPM | DIASTOLIC BLOOD PRESSURE: 77 MMHG | RESPIRATION RATE: 18 BRPM | OXYGEN SATURATION: 99 % | HEART RATE: 56 BPM | DIASTOLIC BLOOD PRESSURE: 79 MMHG | SYSTOLIC BLOOD PRESSURE: 149 MMHG | RESPIRATION RATE: 18 BRPM | SYSTOLIC BLOOD PRESSURE: 142 MMHG | SYSTOLIC BLOOD PRESSURE: 126 MMHG | TEMPERATURE: 97.8 F | TEMPERATURE: 98 F | OXYGEN SATURATION: 95 % | OXYGEN SATURATION: 97 % | DIASTOLIC BLOOD PRESSURE: 64 MMHG

## 2023-01-01 VITALS
HEART RATE: 57 BPM | DIASTOLIC BLOOD PRESSURE: 78 MMHG | OXYGEN SATURATION: 95 % | RESPIRATION RATE: 16 BRPM | HEIGHT: 71 IN | TEMPERATURE: 98.1 F | SYSTOLIC BLOOD PRESSURE: 139 MMHG | BODY MASS INDEX: 41.02 KG/M2 | WEIGHT: 293 LBS

## 2023-01-01 VITALS
OXYGEN SATURATION: 95 % | HEART RATE: 61 BPM | DIASTOLIC BLOOD PRESSURE: 72 MMHG | SYSTOLIC BLOOD PRESSURE: 125 MMHG | RESPIRATION RATE: 18 BRPM | TEMPERATURE: 97.6 F

## 2023-01-01 VITALS
TEMPERATURE: 98.5 F | DIASTOLIC BLOOD PRESSURE: 60 MMHG | SYSTOLIC BLOOD PRESSURE: 150 MMHG | OXYGEN SATURATION: 98 % | RESPIRATION RATE: 18 BRPM | HEART RATE: 56 BPM

## 2023-01-01 VITALS
HEART RATE: 56 BPM | RESPIRATION RATE: 18 BRPM | DIASTOLIC BLOOD PRESSURE: 77 MMHG | TEMPERATURE: 97.9 F | OXYGEN SATURATION: 96 % | SYSTOLIC BLOOD PRESSURE: 124 MMHG

## 2023-01-01 VITALS
HEART RATE: 70 BPM | OXYGEN SATURATION: 100 % | SYSTOLIC BLOOD PRESSURE: 119 MMHG | TEMPERATURE: 98.2 F | RESPIRATION RATE: 18 BRPM | DIASTOLIC BLOOD PRESSURE: 69 MMHG

## 2023-01-01 VITALS
RESPIRATION RATE: 20 BRPM | OXYGEN SATURATION: 98 % | TEMPERATURE: 98.3 F | HEART RATE: 58 BPM | SYSTOLIC BLOOD PRESSURE: 132 MMHG | DIASTOLIC BLOOD PRESSURE: 98 MMHG

## 2023-01-01 VITALS
TEMPERATURE: 97.5 F | DIASTOLIC BLOOD PRESSURE: 79 MMHG | RESPIRATION RATE: 18 BRPM | SYSTOLIC BLOOD PRESSURE: 146 MMHG | OXYGEN SATURATION: 94 % | HEART RATE: 63 BPM

## 2023-01-01 VITALS
DIASTOLIC BLOOD PRESSURE: 79 MMHG | SYSTOLIC BLOOD PRESSURE: 149 MMHG | HEART RATE: 59 BPM | TEMPERATURE: 97.9 F | RESPIRATION RATE: 18 BRPM | OXYGEN SATURATION: 95 %

## 2023-01-01 VITALS
TEMPERATURE: 97.8 F | RESPIRATION RATE: 18 BRPM | DIASTOLIC BLOOD PRESSURE: 68 MMHG | HEART RATE: 64 BPM | SYSTOLIC BLOOD PRESSURE: 125 MMHG | OXYGEN SATURATION: 96 %

## 2023-01-01 VITALS
OXYGEN SATURATION: 96 % | SYSTOLIC BLOOD PRESSURE: 132 MMHG | TEMPERATURE: 97.4 F | HEART RATE: 59 BPM | DIASTOLIC BLOOD PRESSURE: 56 MMHG | RESPIRATION RATE: 18 BRPM

## 2023-01-01 VITALS
WEIGHT: 293 LBS | SYSTOLIC BLOOD PRESSURE: 155 MMHG | DIASTOLIC BLOOD PRESSURE: 70 MMHG | RESPIRATION RATE: 18 BRPM | BODY MASS INDEX: 41.02 KG/M2 | HEART RATE: 56 BPM | TEMPERATURE: 97.9 F | OXYGEN SATURATION: 95 % | HEIGHT: 71 IN

## 2023-01-01 VITALS
SYSTOLIC BLOOD PRESSURE: 126 MMHG | HEART RATE: 56 BPM | DIASTOLIC BLOOD PRESSURE: 56 MMHG | TEMPERATURE: 98 F | RESPIRATION RATE: 18 BRPM | OXYGEN SATURATION: 97 % | HEART RATE: 62 BPM | SYSTOLIC BLOOD PRESSURE: 138 MMHG | RESPIRATION RATE: 20 BRPM | OXYGEN SATURATION: 99 % | TEMPERATURE: 98.1 F | DIASTOLIC BLOOD PRESSURE: 77 MMHG

## 2023-01-01 VITALS
RESPIRATION RATE: 16 BRPM | SYSTOLIC BLOOD PRESSURE: 144 MMHG | TEMPERATURE: 97.7 F | DIASTOLIC BLOOD PRESSURE: 76 MMHG | HEART RATE: 57 BPM | OXYGEN SATURATION: 99 %

## 2023-01-01 VITALS
SYSTOLIC BLOOD PRESSURE: 139 MMHG | DIASTOLIC BLOOD PRESSURE: 75 MMHG | OXYGEN SATURATION: 96 % | HEART RATE: 56 BPM | TEMPERATURE: 98 F | RESPIRATION RATE: 18 BRPM

## 2023-01-01 VITALS
HEART RATE: 59 BPM | RESPIRATION RATE: 16 BRPM | TEMPERATURE: 97.9 F | SYSTOLIC BLOOD PRESSURE: 145 MMHG | DIASTOLIC BLOOD PRESSURE: 73 MMHG | OXYGEN SATURATION: 96 %

## 2023-01-01 VITALS
RESPIRATION RATE: 18 BRPM | OXYGEN SATURATION: 97 % | DIASTOLIC BLOOD PRESSURE: 57 MMHG | TEMPERATURE: 97.8 F | HEART RATE: 61 BPM | SYSTOLIC BLOOD PRESSURE: 144 MMHG

## 2023-01-01 VITALS
SYSTOLIC BLOOD PRESSURE: 175 MMHG | DIASTOLIC BLOOD PRESSURE: 95 MMHG | HEIGHT: 71 IN | OXYGEN SATURATION: 92 % | RESPIRATION RATE: 22 BRPM | HEART RATE: 113 BPM | TEMPERATURE: 99 F | WEIGHT: 293 LBS

## 2023-01-01 VITALS
DIASTOLIC BLOOD PRESSURE: 66 MMHG | RESPIRATION RATE: 20 BRPM | OXYGEN SATURATION: 96 % | SYSTOLIC BLOOD PRESSURE: 128 MMHG | HEART RATE: 62 BPM | TEMPERATURE: 98.1 F

## 2023-01-01 VITALS
SYSTOLIC BLOOD PRESSURE: 149 MMHG | TEMPERATURE: 97.9 F | DIASTOLIC BLOOD PRESSURE: 75 MMHG | RESPIRATION RATE: 18 BRPM | OXYGEN SATURATION: 99 % | HEART RATE: 59 BPM

## 2023-01-01 VITALS
DIASTOLIC BLOOD PRESSURE: 59 MMHG | HEART RATE: 63 BPM | SYSTOLIC BLOOD PRESSURE: 102 MMHG | TEMPERATURE: 97.6 F | OXYGEN SATURATION: 98 % | RESPIRATION RATE: 20 BRPM

## 2023-01-01 VITALS
TEMPERATURE: 97.8 F | OXYGEN SATURATION: 97 % | SYSTOLIC BLOOD PRESSURE: 149 MMHG | DIASTOLIC BLOOD PRESSURE: 63 MMHG | HEART RATE: 61 BPM | RESPIRATION RATE: 20 BRPM

## 2023-01-01 VITALS
WEIGHT: 293 LBS | DIASTOLIC BLOOD PRESSURE: 78 MMHG | BODY MASS INDEX: 41.02 KG/M2 | HEART RATE: 58 BPM | OXYGEN SATURATION: 95 % | SYSTOLIC BLOOD PRESSURE: 131 MMHG | TEMPERATURE: 98 F | HEIGHT: 71 IN | RESPIRATION RATE: 18 BRPM

## 2023-01-01 VITALS
TEMPERATURE: 97.7 F | RESPIRATION RATE: 18 BRPM | SYSTOLIC BLOOD PRESSURE: 136 MMHG | OXYGEN SATURATION: 95 % | DIASTOLIC BLOOD PRESSURE: 72 MMHG | HEART RATE: 63 BPM

## 2023-01-01 VITALS
SYSTOLIC BLOOD PRESSURE: 132 MMHG | HEART RATE: 58 BPM | RESPIRATION RATE: 20 BRPM | DIASTOLIC BLOOD PRESSURE: 98 MMHG | TEMPERATURE: 98.3 F | OXYGEN SATURATION: 98 %

## 2023-01-01 VITALS
BODY MASS INDEX: 41.02 KG/M2 | RESPIRATION RATE: 17 BRPM | TEMPERATURE: 98 F | DIASTOLIC BLOOD PRESSURE: 70 MMHG | OXYGEN SATURATION: 98 % | HEIGHT: 71 IN | SYSTOLIC BLOOD PRESSURE: 110 MMHG | HEART RATE: 78 BPM | WEIGHT: 293 LBS

## 2023-01-01 VITALS
RESPIRATION RATE: 18 BRPM | HEART RATE: 66 BPM | OXYGEN SATURATION: 95 % | SYSTOLIC BLOOD PRESSURE: 147 MMHG | DIASTOLIC BLOOD PRESSURE: 77 MMHG | TEMPERATURE: 97.8 F

## 2023-01-01 VITALS
RESPIRATION RATE: 18 BRPM | DIASTOLIC BLOOD PRESSURE: 74 MMHG | WEIGHT: 293 LBS | HEIGHT: 71 IN | BODY MASS INDEX: 41.02 KG/M2 | HEART RATE: 54 BPM | SYSTOLIC BLOOD PRESSURE: 144 MMHG | TEMPERATURE: 97.8 F | OXYGEN SATURATION: 98 %

## 2023-01-01 VITALS
BODY MASS INDEX: 41.02 KG/M2 | TEMPERATURE: 98.2 F | SYSTOLIC BLOOD PRESSURE: 130 MMHG | OXYGEN SATURATION: 98 % | HEART RATE: 58 BPM | DIASTOLIC BLOOD PRESSURE: 70 MMHG | WEIGHT: 293 LBS | HEIGHT: 71 IN | RESPIRATION RATE: 18 BRPM

## 2023-01-01 VITALS
RESPIRATION RATE: 18 BRPM | OXYGEN SATURATION: 96 % | SYSTOLIC BLOOD PRESSURE: 134 MMHG | HEART RATE: 66 BPM | TEMPERATURE: 97.6 F | DIASTOLIC BLOOD PRESSURE: 56 MMHG

## 2023-01-01 VITALS
RESPIRATION RATE: 18 BRPM | DIASTOLIC BLOOD PRESSURE: 77 MMHG | TEMPERATURE: 97.8 F | HEART RATE: 56 BPM | SYSTOLIC BLOOD PRESSURE: 148 MMHG | OXYGEN SATURATION: 97 %

## 2023-01-01 VITALS
TEMPERATURE: 98 F | RESPIRATION RATE: 18 BRPM | HEART RATE: 65 BPM | OXYGEN SATURATION: 98 % | SYSTOLIC BLOOD PRESSURE: 137 MMHG | DIASTOLIC BLOOD PRESSURE: 74 MMHG

## 2023-01-01 VITALS
OXYGEN SATURATION: 95 % | RESPIRATION RATE: 18 BRPM | SYSTOLIC BLOOD PRESSURE: 152 MMHG | TEMPERATURE: 97.8 F | HEART RATE: 56 BPM | DIASTOLIC BLOOD PRESSURE: 73 MMHG

## 2023-01-01 VITALS
RESPIRATION RATE: 20 BRPM | TEMPERATURE: 97.8 F | HEART RATE: 59 BPM | OXYGEN SATURATION: 98 % | DIASTOLIC BLOOD PRESSURE: 86 MMHG | SYSTOLIC BLOOD PRESSURE: 155 MMHG

## 2023-01-01 VITALS
TEMPERATURE: 97.9 F | RESPIRATION RATE: 16 BRPM | HEART RATE: 59 BPM | OXYGEN SATURATION: 98 % | SYSTOLIC BLOOD PRESSURE: 127 MMHG | DIASTOLIC BLOOD PRESSURE: 46 MMHG

## 2023-01-01 VITALS
DIASTOLIC BLOOD PRESSURE: 79 MMHG | TEMPERATURE: 98 F | DIASTOLIC BLOOD PRESSURE: 71 MMHG | SYSTOLIC BLOOD PRESSURE: 134 MMHG | OXYGEN SATURATION: 99 % | SYSTOLIC BLOOD PRESSURE: 146 MMHG | RESPIRATION RATE: 18 BRPM | OXYGEN SATURATION: 95 % | TEMPERATURE: 97.8 F | RESPIRATION RATE: 16 BRPM | HEART RATE: 56 BPM | HEART RATE: 56 BPM

## 2023-01-01 VITALS
WEIGHT: 293 LBS | RESPIRATION RATE: 18 BRPM | DIASTOLIC BLOOD PRESSURE: 75 MMHG | BODY MASS INDEX: 41.02 KG/M2 | HEIGHT: 71 IN | HEART RATE: 56 BPM | SYSTOLIC BLOOD PRESSURE: 151 MMHG | TEMPERATURE: 97.6 F | OXYGEN SATURATION: 95 %

## 2023-01-01 VITALS
SYSTOLIC BLOOD PRESSURE: 148 MMHG | BODY MASS INDEX: 41.02 KG/M2 | TEMPERATURE: 98 F | DIASTOLIC BLOOD PRESSURE: 86 MMHG | WEIGHT: 293 LBS | RESPIRATION RATE: 18 BRPM | HEART RATE: 72 BPM | OXYGEN SATURATION: 99 % | HEIGHT: 71 IN

## 2023-01-01 VITALS
TEMPERATURE: 97.6 F | RESPIRATION RATE: 18 BRPM | SYSTOLIC BLOOD PRESSURE: 149 MMHG | OXYGEN SATURATION: 100 % | HEART RATE: 56 BPM | DIASTOLIC BLOOD PRESSURE: 78 MMHG

## 2023-01-01 VITALS
RESPIRATION RATE: 18 BRPM | DIASTOLIC BLOOD PRESSURE: 79 MMHG | TEMPERATURE: 97.5 F | HEART RATE: 57 BPM | OXYGEN SATURATION: 98 % | SYSTOLIC BLOOD PRESSURE: 151 MMHG

## 2023-01-01 VITALS
DIASTOLIC BLOOD PRESSURE: 73 MMHG | HEART RATE: 57 BPM | RESPIRATION RATE: 18 BRPM | SYSTOLIC BLOOD PRESSURE: 135 MMHG | TEMPERATURE: 97.8 F | OXYGEN SATURATION: 95 %

## 2023-01-01 VITALS
DIASTOLIC BLOOD PRESSURE: 76 MMHG | RESPIRATION RATE: 24 BRPM | SYSTOLIC BLOOD PRESSURE: 143 MMHG | HEART RATE: 63 BPM | TEMPERATURE: 98 F | OXYGEN SATURATION: 95 %

## 2023-01-01 VITALS
OXYGEN SATURATION: 97 % | DIASTOLIC BLOOD PRESSURE: 74 MMHG | HEART RATE: 58 BPM | RESPIRATION RATE: 20 BRPM | SYSTOLIC BLOOD PRESSURE: 127 MMHG | TEMPERATURE: 98.1 F

## 2023-01-01 VITALS
DIASTOLIC BLOOD PRESSURE: 59 MMHG | OXYGEN SATURATION: 99 % | SYSTOLIC BLOOD PRESSURE: 141 MMHG | TEMPERATURE: 97.6 F | HEART RATE: 59 BPM | RESPIRATION RATE: 18 BRPM

## 2023-01-01 VITALS
DIASTOLIC BLOOD PRESSURE: 74 MMHG | HEART RATE: 58 BPM | RESPIRATION RATE: 20 BRPM | TEMPERATURE: 97.5 F | SYSTOLIC BLOOD PRESSURE: 132 MMHG | OXYGEN SATURATION: 96 %

## 2023-01-01 VITALS
HEART RATE: 55 BPM | RESPIRATION RATE: 18 BRPM | OXYGEN SATURATION: 100 % | DIASTOLIC BLOOD PRESSURE: 80 MMHG | SYSTOLIC BLOOD PRESSURE: 146 MMHG | TEMPERATURE: 97.8 F

## 2023-01-01 VITALS
HEART RATE: 60 BPM | TEMPERATURE: 97.9 F | RESPIRATION RATE: 18 BRPM | OXYGEN SATURATION: 95 % | DIASTOLIC BLOOD PRESSURE: 68 MMHG | SYSTOLIC BLOOD PRESSURE: 121 MMHG

## 2023-01-01 VITALS
RESPIRATION RATE: 18 BRPM | SYSTOLIC BLOOD PRESSURE: 145 MMHG | DIASTOLIC BLOOD PRESSURE: 74 MMHG | TEMPERATURE: 98.3 F | HEART RATE: 61 BPM | OXYGEN SATURATION: 99 %

## 2023-01-01 VITALS
OXYGEN SATURATION: 98 % | SYSTOLIC BLOOD PRESSURE: 149 MMHG | HEART RATE: 55 BPM | RESPIRATION RATE: 14 BRPM | TEMPERATURE: 97.9 F | DIASTOLIC BLOOD PRESSURE: 67 MMHG

## 2023-01-01 VITALS
TEMPERATURE: 97.6 F | SYSTOLIC BLOOD PRESSURE: 121 MMHG | RESPIRATION RATE: 18 BRPM | HEART RATE: 61 BPM | DIASTOLIC BLOOD PRESSURE: 71 MMHG | OXYGEN SATURATION: 97 %

## 2023-01-01 VITALS
RESPIRATION RATE: 18 BRPM | SYSTOLIC BLOOD PRESSURE: 133 MMHG | OXYGEN SATURATION: 98 % | DIASTOLIC BLOOD PRESSURE: 51 MMHG | HEART RATE: 55 BPM | TEMPERATURE: 98.1 F

## 2023-01-01 VITALS
SYSTOLIC BLOOD PRESSURE: 143 MMHG | RESPIRATION RATE: 18 BRPM | TEMPERATURE: 97.6 F | DIASTOLIC BLOOD PRESSURE: 60 MMHG | OXYGEN SATURATION: 96 % | HEART RATE: 60 BPM

## 2023-01-01 VITALS
RESPIRATION RATE: 18 BRPM | OXYGEN SATURATION: 97 % | SYSTOLIC BLOOD PRESSURE: 116 MMHG | TEMPERATURE: 97.9 F | DIASTOLIC BLOOD PRESSURE: 71 MMHG | HEART RATE: 56 BPM

## 2023-01-01 VITALS
OXYGEN SATURATION: 94 % | RESPIRATION RATE: 18 BRPM | TEMPERATURE: 98 F | SYSTOLIC BLOOD PRESSURE: 136 MMHG | HEART RATE: 60 BPM | DIASTOLIC BLOOD PRESSURE: 73 MMHG

## 2023-01-01 VITALS
HEIGHT: 71 IN | SYSTOLIC BLOOD PRESSURE: 125 MMHG | DIASTOLIC BLOOD PRESSURE: 75 MMHG | HEART RATE: 61 BPM | BODY MASS INDEX: 41.02 KG/M2 | OXYGEN SATURATION: 93 % | WEIGHT: 293 LBS

## 2023-01-01 VITALS
HEART RATE: 61 BPM | HEIGHT: 71 IN | BODY MASS INDEX: 41.02 KG/M2 | SYSTOLIC BLOOD PRESSURE: 125 MMHG | OXYGEN SATURATION: 96 % | TEMPERATURE: 97.6 F | RESPIRATION RATE: 16 BRPM | WEIGHT: 293 LBS | DIASTOLIC BLOOD PRESSURE: 73 MMHG

## 2023-01-01 DIAGNOSIS — L97.412 NON-PRESSURE CHRONIC ULCER OF RIGHT HEEL AND MIDFOOT WITH FAT LAYER EXPOSED: ICD-10-CM

## 2023-01-01 DIAGNOSIS — E11.621 TYPE 2 DIABETES MELLITUS WITH FOOT ULCER: ICD-10-CM

## 2023-01-01 DIAGNOSIS — Z82.5 FAMILY HISTORY OF ASTHMA AND OTHER CHRONIC LOWER RESPIRATORY DISEASES: ICD-10-CM

## 2023-01-01 DIAGNOSIS — Z98.890 OTHER SPECIFIED POSTPROCEDURAL STATES: Chronic | ICD-10-CM

## 2023-01-01 DIAGNOSIS — Z79.899 OTHER LONG TERM (CURRENT) DRUG THERAPY: ICD-10-CM

## 2023-01-01 DIAGNOSIS — E88.9 METABOLIC DISORDER, UNSPECIFIED: ICD-10-CM

## 2023-01-01 DIAGNOSIS — L84 CORNS AND CALLOSITIES: ICD-10-CM

## 2023-01-01 DIAGNOSIS — Z80.3 FAMILY HISTORY OF MALIGNANT NEOPLASM OF BREAST: ICD-10-CM

## 2023-01-01 DIAGNOSIS — Z86.69 PERSONAL HISTORY OF OTHER DISEASES OF THE NERVOUS SYSTEM AND SENSE ORGANS: Chronic | ICD-10-CM

## 2023-01-01 DIAGNOSIS — A41.9 SEPSIS, UNSPECIFIED ORGANISM: ICD-10-CM

## 2023-01-01 DIAGNOSIS — Z79.4 LONG TERM (CURRENT) USE OF INSULIN: ICD-10-CM

## 2023-01-01 DIAGNOSIS — S02.91XA UNSPECIFIED FRACTURE OF SKULL, INITIAL ENCOUNTER FOR CLOSED FRACTURE: Chronic | ICD-10-CM

## 2023-01-01 DIAGNOSIS — I11.0 HYPERTENSIVE HEART DISEASE WITH HEART FAILURE: ICD-10-CM

## 2023-01-01 DIAGNOSIS — Z98.890 OTHER SPECIFIED POSTPROCEDURAL STATES: ICD-10-CM

## 2023-01-01 DIAGNOSIS — E11.40 TYPE 2 DIABETES MELLITUS WITH DIABETIC NEUROPATHY, UNSPECIFIED: ICD-10-CM

## 2023-01-01 DIAGNOSIS — I50.9 HEART FAILURE, UNSPECIFIED: ICD-10-CM

## 2023-01-01 DIAGNOSIS — E66.01 MORBID (SEVERE) OBESITY DUE TO EXCESS CALORIES: ICD-10-CM

## 2023-01-01 DIAGNOSIS — A41.50 GRAM-NEGATIVE SEPSIS, UNSPECIFIED: ICD-10-CM

## 2023-01-01 DIAGNOSIS — M75.00 ADHESIVE CAPSULITIS OF UNSPECIFIED SHOULDER: Chronic | ICD-10-CM

## 2023-01-01 DIAGNOSIS — I44.7 LEFT BUNDLE-BRANCH BLOCK, UNSPECIFIED: ICD-10-CM

## 2023-01-01 DIAGNOSIS — Z86.19 PERSONAL HISTORY OF OTHER INFECTIOUS AND PARASITIC DISEASES: ICD-10-CM

## 2023-01-01 DIAGNOSIS — Z98.49 CATARACT EXTRACTION STATUS, UNSPECIFIED EYE: ICD-10-CM

## 2023-01-01 DIAGNOSIS — Z88.0 ALLERGY STATUS TO PENICILLIN: ICD-10-CM

## 2023-01-01 DIAGNOSIS — E11.9 TYPE 2 DIABETES MELLITUS W/OUT COMPLICATIONS: ICD-10-CM

## 2023-01-01 DIAGNOSIS — I50.22 CHRONIC SYSTOLIC (CONGESTIVE) HEART FAILURE: ICD-10-CM

## 2023-01-01 DIAGNOSIS — E11.51 TYPE 2 DIABETES MELLITUS WITH DIABETIC PERIPHERAL ANGIOPATHY WITHOUT GANGRENE: ICD-10-CM

## 2023-01-01 DIAGNOSIS — Z83.3 FAMILY HISTORY OF DIABETES MELLITUS: ICD-10-CM

## 2023-01-01 DIAGNOSIS — E11.69 TYPE 2 DIABETES MELLITUS WITH OTHER SPECIFIED COMPLICATION: ICD-10-CM

## 2023-01-01 DIAGNOSIS — Z88.8 ALLERGY STATUS TO OTHER DRUGS, MEDICAMENTS AND BIOLOGICAL SUBSTANCES: ICD-10-CM

## 2023-01-01 DIAGNOSIS — E03.9 HYPOTHYROIDISM, UNSPECIFIED: ICD-10-CM

## 2023-01-01 DIAGNOSIS — N12 TUBULO-INTERSTITIAL NEPHRITIS, NOT SPECIFIED AS ACUTE OR CHRONIC: ICD-10-CM

## 2023-01-01 DIAGNOSIS — Z90.712 ACQUIRED ABSENCE OF CERVIX WITH REMAINING UTERUS: ICD-10-CM

## 2023-01-01 DIAGNOSIS — L97.424 NON-PRESSURE CHRONIC ULCER OF LEFT HEEL AND MIDFOOT WITH NECROSIS OF BONE: ICD-10-CM

## 2023-01-01 DIAGNOSIS — I10 ESSENTIAL (PRIMARY) HYPERTENSION: ICD-10-CM

## 2023-01-01 DIAGNOSIS — E88.89 OTHER SPECIFIED METABOLIC DISORDERS: ICD-10-CM

## 2023-01-01 DIAGNOSIS — Z82.3 FAMILY HISTORY OF STROKE: ICD-10-CM

## 2023-01-01 DIAGNOSIS — N18.4 CHRONIC KIDNEY DISEASE, STAGE 4 (SEVERE): ICD-10-CM

## 2023-01-01 DIAGNOSIS — S91.309A UNSPECIFIED OPEN WOUND, UNSPECIFIED FOOT, INITIAL ENCOUNTER: ICD-10-CM

## 2023-01-01 DIAGNOSIS — E66.9 OBESITY, UNSPECIFIED: ICD-10-CM

## 2023-01-01 DIAGNOSIS — D64.9 ANEMIA, UNSPECIFIED: ICD-10-CM

## 2023-01-01 DIAGNOSIS — R23.4 CHANGES IN SKIN TEXTURE: ICD-10-CM

## 2023-01-01 DIAGNOSIS — E78.5 HYPERLIPIDEMIA, UNSPECIFIED: ICD-10-CM

## 2023-01-01 DIAGNOSIS — N10 ACUTE PYELONEPHRITIS: ICD-10-CM

## 2023-01-01 DIAGNOSIS — N17.9 ACUTE KIDNEY FAILURE, UNSPECIFIED: ICD-10-CM

## 2023-01-01 DIAGNOSIS — E11.9 TYPE 2 DIABETES MELLITUS WITHOUT COMPLICATIONS: ICD-10-CM

## 2023-01-01 DIAGNOSIS — R33.9 RETENTION OF URINE, UNSPECIFIED: ICD-10-CM

## 2023-01-01 DIAGNOSIS — N39.0 SEPSIS, UNSPECIFIED ORGANISM: ICD-10-CM

## 2023-01-01 DIAGNOSIS — Z87.2 PERSONAL HISTORY OF DISEASES OF THE SKIN AND SUBCUTANEOUS TISSUE: ICD-10-CM

## 2023-01-01 LAB
-  AMIKACIN: SIGNIFICANT CHANGE UP
-  AMPICILLIN/SULBACTAM: SIGNIFICANT CHANGE UP
-  AMPICILLIN: SIGNIFICANT CHANGE UP
-  AZTREONAM: SIGNIFICANT CHANGE UP
-  CEFAZOLIN: SIGNIFICANT CHANGE UP
-  CEFEPIME: SIGNIFICANT CHANGE UP
-  CEFOXITIN: SIGNIFICANT CHANGE UP
-  CEFTRIAXONE: SIGNIFICANT CHANGE UP
-  CIPROFLOXACIN: SIGNIFICANT CHANGE UP
-  ERTAPENEM: SIGNIFICANT CHANGE UP
-  GENTAMICIN: SIGNIFICANT CHANGE UP
-  IMIPENEM: SIGNIFICANT CHANGE UP
-  LEVOFLOXACIN: SIGNIFICANT CHANGE UP
-  MEROPENEM: SIGNIFICANT CHANGE UP
-  PIPERACILLIN/TAZOBACTAM: SIGNIFICANT CHANGE UP
-  TOBRAMYCIN: SIGNIFICANT CHANGE UP
-  TRIMETHOPRIM/SULFAMETHOXAZOLE: SIGNIFICANT CHANGE UP
A1C WITH ESTIMATED AVERAGE GLUCOSE RESULT: 7.3 % — HIGH (ref 4–5.6)
ALBUMIN SERPL ELPH-MCNC: 2.3 G/DL — LOW (ref 3.3–5)
ALBUMIN SERPL ELPH-MCNC: 2.6 G/DL — LOW (ref 3.3–5)
ALBUMIN SERPL ELPH-MCNC: 3.5 G/DL — SIGNIFICANT CHANGE UP (ref 3.3–5)
ALP SERPL-CCNC: 58 U/L — SIGNIFICANT CHANGE UP (ref 40–120)
ALP SERPL-CCNC: 64 U/L — SIGNIFICANT CHANGE UP (ref 40–120)
ALP SERPL-CCNC: 87 U/L — SIGNIFICANT CHANGE UP (ref 40–120)
ALT FLD-CCNC: 12 U/L — SIGNIFICANT CHANGE UP (ref 12–78)
ALT FLD-CCNC: 16 U/L — SIGNIFICANT CHANGE UP (ref 12–78)
ALT FLD-CCNC: 22 U/L — SIGNIFICANT CHANGE UP (ref 12–78)
ANION GAP SERPL CALC-SCNC: 6 MMOL/L — SIGNIFICANT CHANGE UP (ref 5–17)
ANION GAP SERPL CALC-SCNC: 8 MMOL/L — SIGNIFICANT CHANGE UP (ref 5–17)
ANION GAP SERPL CALC-SCNC: 8 MMOL/L — SIGNIFICANT CHANGE UP (ref 5–17)
ANION GAP SERPL CALC-SCNC: 9 MMOL/L — SIGNIFICANT CHANGE UP (ref 5–17)
ANION GAP SERPL CALC-SCNC: 9 MMOL/L — SIGNIFICANT CHANGE UP (ref 5–17)
APPEARANCE UR: ABNORMAL
APTT BLD: 29.5 SEC — SIGNIFICANT CHANGE UP (ref 24.5–35.6)
AST SERPL-CCNC: 15 U/L — SIGNIFICANT CHANGE UP (ref 15–37)
AST SERPL-CCNC: 24 U/L — SIGNIFICANT CHANGE UP (ref 15–37)
AST SERPL-CCNC: 9 U/L — LOW (ref 15–37)
BASOPHILS # BLD AUTO: 0.07 K/UL — SIGNIFICANT CHANGE UP (ref 0–0.2)
BASOPHILS # BLD AUTO: 0.12 K/UL — SIGNIFICANT CHANGE UP (ref 0–0.2)
BASOPHILS NFR BLD AUTO: 0.6 % — SIGNIFICANT CHANGE UP (ref 0–2)
BASOPHILS NFR BLD AUTO: 1 % — SIGNIFICANT CHANGE UP (ref 0–2)
BILIRUB SERPL-MCNC: 0.3 MG/DL — SIGNIFICANT CHANGE UP (ref 0.2–1.2)
BILIRUB SERPL-MCNC: 0.6 MG/DL — SIGNIFICANT CHANGE UP (ref 0.2–1.2)
BILIRUB SERPL-MCNC: 0.7 MG/DL — SIGNIFICANT CHANGE UP (ref 0.2–1.2)
BILIRUB UR-MCNC: NEGATIVE — SIGNIFICANT CHANGE UP
BUN SERPL-MCNC: 67 MG/DL — HIGH (ref 7–23)
BUN SERPL-MCNC: 73 MG/DL — HIGH (ref 7–23)
BUN SERPL-MCNC: 76 MG/DL — HIGH (ref 7–23)
BUN SERPL-MCNC: 80 MG/DL — HIGH (ref 7–23)
BUN SERPL-MCNC: 83 MG/DL — HIGH (ref 7–23)
CALCIUM SERPL-MCNC: 8.1 MG/DL — LOW (ref 8.5–10.1)
CALCIUM SERPL-MCNC: 8.2 MG/DL — LOW (ref 8.5–10.1)
CALCIUM SERPL-MCNC: 8.4 MG/DL — LOW (ref 8.5–10.1)
CALCIUM SERPL-MCNC: 8.6 MG/DL — SIGNIFICANT CHANGE UP (ref 8.5–10.1)
CALCIUM SERPL-MCNC: 8.8 MG/DL — SIGNIFICANT CHANGE UP (ref 8.5–10.1)
CHLORIDE SERPL-SCNC: 102 MMOL/L — SIGNIFICANT CHANGE UP (ref 96–108)
CHLORIDE SERPL-SCNC: 103 MMOL/L — SIGNIFICANT CHANGE UP (ref 96–108)
CHLORIDE SERPL-SCNC: 96 MMOL/L — SIGNIFICANT CHANGE UP (ref 96–108)
CHLORIDE SERPL-SCNC: 98 MMOL/L — SIGNIFICANT CHANGE UP (ref 96–108)
CHLORIDE SERPL-SCNC: 99 MMOL/L — SIGNIFICANT CHANGE UP (ref 96–108)
CO2 SERPL-SCNC: 23 MMOL/L — SIGNIFICANT CHANGE UP (ref 22–31)
CO2 SERPL-SCNC: 24 MMOL/L — SIGNIFICANT CHANGE UP (ref 22–31)
CO2 SERPL-SCNC: 26 MMOL/L — SIGNIFICANT CHANGE UP (ref 22–31)
CO2 SERPL-SCNC: 27 MMOL/L — SIGNIFICANT CHANGE UP (ref 22–31)
CO2 SERPL-SCNC: 27 MMOL/L — SIGNIFICANT CHANGE UP (ref 22–31)
COLOR SPEC: YELLOW — SIGNIFICANT CHANGE UP
CREAT ?TM UR-MCNC: 107 MG/DL — SIGNIFICANT CHANGE UP
CREAT SERPL-MCNC: 2.8 MG/DL — HIGH (ref 0.5–1.3)
CREAT SERPL-MCNC: 3 MG/DL — HIGH (ref 0.5–1.3)
CREAT SERPL-MCNC: 3.2 MG/DL — HIGH (ref 0.5–1.3)
CREAT SERPL-MCNC: 3.3 MG/DL — HIGH (ref 0.5–1.3)
CREAT SERPL-MCNC: 3.5 MG/DL — HIGH (ref 0.5–1.3)
CULTURE RESULTS: SIGNIFICANT CHANGE UP
DIFF PNL FLD: ABNORMAL
E COLI DNA BLD POS QL NAA+NON-PROBE: SIGNIFICANT CHANGE UP
EGFR: 13 ML/MIN/1.73M2 — LOW
EGFR: 14 ML/MIN/1.73M2 — LOW
EGFR: 15 ML/MIN/1.73M2 — LOW
EGFR: 16 ML/MIN/1.73M2 — LOW
EGFR: 17 ML/MIN/1.73M2 — LOW
EOSINOPHIL # BLD AUTO: 0 K/UL — SIGNIFICANT CHANGE UP (ref 0–0.5)
EOSINOPHIL # BLD AUTO: 0.19 K/UL — SIGNIFICANT CHANGE UP (ref 0–0.5)
EOSINOPHIL NFR BLD AUTO: 0 % — SIGNIFICANT CHANGE UP (ref 0–6)
EOSINOPHIL NFR BLD AUTO: 1.5 % — SIGNIFICANT CHANGE UP (ref 0–6)
ESTIMATED AVERAGE GLUCOSE: 163 MG/DL — HIGH (ref 68–114)
GLUCOSE BLDC GLUCOMTR-MCNC: 114 MG/DL — HIGH (ref 70–99)
GLUCOSE BLDC GLUCOMTR-MCNC: 126 MG/DL — HIGH (ref 70–99)
GLUCOSE BLDC GLUCOMTR-MCNC: 131 MG/DL — HIGH (ref 70–99)
GLUCOSE BLDC GLUCOMTR-MCNC: 138 MG/DL — HIGH (ref 70–99)
GLUCOSE BLDC GLUCOMTR-MCNC: 147 MG/DL — HIGH (ref 70–99)
GLUCOSE BLDC GLUCOMTR-MCNC: 157 MG/DL — HIGH (ref 70–99)
GLUCOSE BLDC GLUCOMTR-MCNC: 83 MG/DL — SIGNIFICANT CHANGE UP (ref 70–99)
GLUCOSE SERPL-MCNC: 115 MG/DL — HIGH (ref 70–99)
GLUCOSE SERPL-MCNC: 142 MG/DL — HIGH (ref 70–99)
GLUCOSE SERPL-MCNC: 155 MG/DL — HIGH (ref 70–99)
GLUCOSE SERPL-MCNC: 160 MG/DL — HIGH (ref 70–99)
GLUCOSE SERPL-MCNC: 200 MG/DL — HIGH (ref 70–99)
GLUCOSE UR QL: NEGATIVE MG/DL — SIGNIFICANT CHANGE UP
GRAM STN FLD: SIGNIFICANT CHANGE UP
HCT VFR BLD CALC: 26.9 % — LOW (ref 34.5–45)
HCT VFR BLD CALC: 29.4 % — LOW (ref 34.5–45)
HCT VFR BLD CALC: 29.8 % — LOW (ref 34.5–45)
HCT VFR BLD CALC: 30.1 % — LOW (ref 34.5–45)
HCT VFR BLD CALC: 35 % — SIGNIFICANT CHANGE UP (ref 34.5–45)
HGB BLD-MCNC: 11 G/DL — LOW (ref 11.5–15.5)
HGB BLD-MCNC: 8.6 G/DL — LOW (ref 11.5–15.5)
HGB BLD-MCNC: 9.2 G/DL — LOW (ref 11.5–15.5)
HGB BLD-MCNC: 9.4 G/DL — LOW (ref 11.5–15.5)
HGB BLD-MCNC: 9.8 G/DL — LOW (ref 11.5–15.5)
IMM GRANULOCYTES NFR BLD AUTO: 1 % — HIGH (ref 0–0.9)
INR BLD: 0.99 RATIO — SIGNIFICANT CHANGE UP (ref 0.85–1.18)
KETONES UR-MCNC: NEGATIVE MG/DL — SIGNIFICANT CHANGE UP
LACTATE SERPL-SCNC: 0.8 MMOL/L — SIGNIFICANT CHANGE UP (ref 0.7–2)
LACTATE SERPL-SCNC: 2.2 MMOL/L — HIGH (ref 0.7–2)
LEUKOCYTE ESTERASE UR-ACNC: ABNORMAL
LYMPHOCYTES # BLD AUTO: 0.36 K/UL — LOW (ref 1–3.3)
LYMPHOCYTES # BLD AUTO: 0.71 K/UL — LOW (ref 1–3.3)
LYMPHOCYTES # BLD AUTO: 2.9 % — LOW (ref 13–44)
LYMPHOCYTES # BLD AUTO: 6 % — LOW (ref 13–44)
MAGNESIUM SERPL-MCNC: 2.3 MG/DL — SIGNIFICANT CHANGE UP (ref 1.6–2.6)
MAGNESIUM SERPL-MCNC: 2.3 MG/DL — SIGNIFICANT CHANGE UP (ref 1.6–2.6)
MAGNESIUM SERPL-MCNC: 2.5 MG/DL — SIGNIFICANT CHANGE UP (ref 1.6–2.6)
MANUAL SMEAR VERIFICATION: SIGNIFICANT CHANGE UP
MCHC RBC-ENTMCNC: 28.7 PG — SIGNIFICANT CHANGE UP (ref 27–34)
MCHC RBC-ENTMCNC: 28.8 PG — SIGNIFICANT CHANGE UP (ref 27–34)
MCHC RBC-ENTMCNC: 28.8 PG — SIGNIFICANT CHANGE UP (ref 27–34)
MCHC RBC-ENTMCNC: 29 PG — SIGNIFICANT CHANGE UP (ref 27–34)
MCHC RBC-ENTMCNC: 29 PG — SIGNIFICANT CHANGE UP (ref 27–34)
MCHC RBC-ENTMCNC: 30.9 GM/DL — LOW (ref 32–36)
MCHC RBC-ENTMCNC: 31.4 GM/DL — LOW (ref 32–36)
MCHC RBC-ENTMCNC: 32 GM/DL — SIGNIFICANT CHANGE UP (ref 32–36)
MCHC RBC-ENTMCNC: 32 GM/DL — SIGNIFICANT CHANGE UP (ref 32–36)
MCHC RBC-ENTMCNC: 32.6 GM/DL — SIGNIFICANT CHANGE UP (ref 32–36)
MCV RBC AUTO: 89.1 FL — SIGNIFICANT CHANGE UP (ref 80–100)
MCV RBC AUTO: 90.2 FL — SIGNIFICANT CHANGE UP (ref 80–100)
MCV RBC AUTO: 90.6 FL — SIGNIFICANT CHANGE UP (ref 80–100)
MCV RBC AUTO: 91.6 FL — SIGNIFICANT CHANGE UP (ref 80–100)
MCV RBC AUTO: 92.8 FL — SIGNIFICANT CHANGE UP (ref 80–100)
METHOD TYPE: SIGNIFICANT CHANGE UP
METHOD TYPE: SIGNIFICANT CHANGE UP
MONOCYTES # BLD AUTO: 0 K/UL — SIGNIFICANT CHANGE UP (ref 0–0.9)
MONOCYTES # BLD AUTO: 1.06 K/UL — HIGH (ref 0–0.9)
MONOCYTES NFR BLD AUTO: 0 % — LOW (ref 2–14)
MONOCYTES NFR BLD AUTO: 8.4 % — SIGNIFICANT CHANGE UP (ref 2–14)
NEUTROPHILS # BLD AUTO: 10.77 K/UL — HIGH (ref 1.8–7.4)
NEUTROPHILS # BLD AUTO: 10.95 K/UL — HIGH (ref 1.8–7.4)
NEUTROPHILS NFR BLD AUTO: 85.6 % — HIGH (ref 43–77)
NEUTROPHILS NFR BLD AUTO: 93 % — HIGH (ref 43–77)
NITRITE UR-MCNC: NEGATIVE — SIGNIFICANT CHANGE UP
NRBC # BLD: 0 /100 WBCS — SIGNIFICANT CHANGE UP (ref 0–0)
NRBC # BLD: 0 /100 — SIGNIFICANT CHANGE UP (ref 0–0)
NRBC # BLD: SIGNIFICANT CHANGE UP /100 WBCS (ref 0–0)
ORGANISM # SPEC MICROSCOPIC CNT: SIGNIFICANT CHANGE UP
OSMOLALITY UR: 302 MOSM/KG — SIGNIFICANT CHANGE UP (ref 50–1200)
PH UR: 5.5 — SIGNIFICANT CHANGE UP (ref 5–8)
PHOSPHATE SERPL-MCNC: 3.9 MG/DL — SIGNIFICANT CHANGE UP (ref 2.5–4.5)
PHOSPHATE SERPL-MCNC: 4.2 MG/DL — SIGNIFICANT CHANGE UP (ref 2.5–4.5)
PLAT MORPH BLD: NORMAL — SIGNIFICANT CHANGE UP
PLATELET # BLD AUTO: 170 K/UL — SIGNIFICANT CHANGE UP (ref 150–400)
PLATELET # BLD AUTO: 175 K/UL — SIGNIFICANT CHANGE UP (ref 150–400)
PLATELET # BLD AUTO: 193 K/UL — SIGNIFICANT CHANGE UP (ref 150–400)
PLATELET # BLD AUTO: 237 K/UL — SIGNIFICANT CHANGE UP (ref 150–400)
PLATELET # BLD AUTO: 255 K/UL — SIGNIFICANT CHANGE UP (ref 150–400)
POTASSIUM SERPL-MCNC: 4.1 MMOL/L — SIGNIFICANT CHANGE UP (ref 3.5–5.3)
POTASSIUM SERPL-MCNC: 4.2 MMOL/L — SIGNIFICANT CHANGE UP (ref 3.5–5.3)
POTASSIUM SERPL-MCNC: 4.3 MMOL/L — SIGNIFICANT CHANGE UP (ref 3.5–5.3)
POTASSIUM SERPL-SCNC: 4.1 MMOL/L — SIGNIFICANT CHANGE UP (ref 3.5–5.3)
POTASSIUM SERPL-SCNC: 4.2 MMOL/L — SIGNIFICANT CHANGE UP (ref 3.5–5.3)
POTASSIUM SERPL-SCNC: 4.3 MMOL/L — SIGNIFICANT CHANGE UP (ref 3.5–5.3)
POTASSIUM UR-SCNC: 39.1 MMOL/L — SIGNIFICANT CHANGE UP
PROCALCITONIN SERPL-MCNC: 16.2 NG/ML — HIGH
PROT ?TM UR-MCNC: 87 MG/DL — HIGH (ref 0–12)
PROT SERPL-MCNC: 6.7 G/DL — SIGNIFICANT CHANGE UP (ref 6–8.3)
PROT SERPL-MCNC: 7.2 G/DL — SIGNIFICANT CHANGE UP (ref 6–8.3)
PROT SERPL-MCNC: 9.1 G/DL — HIGH (ref 6–8.3)
PROT UR-MCNC: 100 MG/DL
PROT/CREAT UR-RTO: 0.8 RATIO — HIGH (ref 0–0.2)
PROTHROM AB SERPL-ACNC: 11.6 SEC — SIGNIFICANT CHANGE UP (ref 9.5–13)
RAPID RVP RESULT: SIGNIFICANT CHANGE UP
RBC # BLD: 2.97 M/UL — LOW (ref 3.8–5.2)
RBC # BLD: 3.21 M/UL — LOW (ref 3.8–5.2)
RBC # BLD: 3.26 M/UL — LOW (ref 3.8–5.2)
RBC # BLD: 3.38 M/UL — LOW (ref 3.8–5.2)
RBC # BLD: 3.82 M/UL — SIGNIFICANT CHANGE UP (ref 3.8–5.2)
RBC # FLD: 13.3 % — SIGNIFICANT CHANGE UP (ref 10.3–14.5)
RBC # FLD: 13.4 % — SIGNIFICANT CHANGE UP (ref 10.3–14.5)
RBC # FLD: 13.5 % — SIGNIFICANT CHANGE UP (ref 10.3–14.5)
RBC # FLD: 13.6 % — SIGNIFICANT CHANGE UP (ref 10.3–14.5)
RBC # FLD: 13.9 % — SIGNIFICANT CHANGE UP (ref 10.3–14.5)
RBC BLD AUTO: NORMAL — SIGNIFICANT CHANGE UP
SARS-COV-2 RNA SPEC QL NAA+PROBE: SIGNIFICANT CHANGE UP
SODIUM SERPL-SCNC: 131 MMOL/L — LOW (ref 135–145)
SODIUM SERPL-SCNC: 132 MMOL/L — LOW (ref 135–145)
SODIUM SERPL-SCNC: 133 MMOL/L — LOW (ref 135–145)
SODIUM SERPL-SCNC: 134 MMOL/L — LOW (ref 135–145)
SODIUM SERPL-SCNC: 135 MMOL/L — SIGNIFICANT CHANGE UP (ref 135–145)
SODIUM UR-SCNC: 21 MMOL/L — SIGNIFICANT CHANGE UP
SP GR SPEC: 1.01 — SIGNIFICANT CHANGE UP (ref 1–1.03)
SPECIMEN SOURCE: SIGNIFICANT CHANGE UP
TROPONIN I, HIGH SENSITIVITY RESULT: 23 NG/L — SIGNIFICANT CHANGE UP
UROBILINOGEN FLD QL: 0.2 MG/DL — SIGNIFICANT CHANGE UP (ref 0.2–1)
UUN UR-MCNC: 423 MG/DL — SIGNIFICANT CHANGE UP
WBC # BLD: 11.77 K/UL — HIGH (ref 3.8–10.5)
WBC # BLD: 12.57 K/UL — HIGH (ref 3.8–10.5)
WBC # BLD: 6.29 K/UL — SIGNIFICANT CHANGE UP (ref 3.8–10.5)
WBC # BLD: 7.59 K/UL — SIGNIFICANT CHANGE UP (ref 3.8–10.5)
WBC # BLD: 9 K/UL — SIGNIFICANT CHANGE UP (ref 3.8–10.5)
WBC # FLD AUTO: 11.77 K/UL — HIGH (ref 3.8–10.5)
WBC # FLD AUTO: 12.57 K/UL — HIGH (ref 3.8–10.5)
WBC # FLD AUTO: 6.29 K/UL — SIGNIFICANT CHANGE UP (ref 3.8–10.5)
WBC # FLD AUTO: 7.59 K/UL — SIGNIFICANT CHANGE UP (ref 3.8–10.5)
WBC # FLD AUTO: 9 K/UL — SIGNIFICANT CHANGE UP (ref 3.8–10.5)

## 2023-01-01 PROCEDURE — G0277: CPT

## 2023-01-01 PROCEDURE — G0463: CPT

## 2023-01-01 PROCEDURE — 99183 HYPERBARIC OXYGEN THERAPY: CPT

## 2023-01-01 PROCEDURE — 82962 GLUCOSE BLOOD TEST: CPT

## 2023-01-01 PROCEDURE — 84100 ASSAY OF PHOSPHORUS: CPT

## 2023-01-01 PROCEDURE — 99221 1ST HOSP IP/OBS SF/LOW 40: CPT

## 2023-01-01 PROCEDURE — 93010 ELECTROCARDIOGRAM REPORT: CPT

## 2023-01-01 PROCEDURE — 84133 ASSAY OF URINE POTASSIUM: CPT

## 2023-01-01 PROCEDURE — 83036 HEMOGLOBIN GLYCOSYLATED A1C: CPT

## 2023-01-01 PROCEDURE — 99213 OFFICE O/P EST LOW 20 MIN: CPT

## 2023-01-01 PROCEDURE — 74176 CT ABD & PELVIS W/O CONTRAST: CPT | Mod: MA

## 2023-01-01 PROCEDURE — 96374 THER/PROPH/DIAG INJ IV PUSH: CPT

## 2023-01-01 PROCEDURE — 84145 PROCALCITONIN (PCT): CPT

## 2023-01-01 PROCEDURE — 36415 COLL VENOUS BLD VENIPUNCTURE: CPT

## 2023-01-01 PROCEDURE — 73718 MRI LOWER EXTREMITY W/O DYE: CPT

## 2023-01-01 PROCEDURE — 0225U NFCT DS DNA&RNA 21 SARSCOV2: CPT

## 2023-01-01 PROCEDURE — 93922 UPR/L XTREMITY ART 2 LEVELS: CPT | Mod: 26

## 2023-01-01 PROCEDURE — 99203 OFFICE O/P NEW LOW 30 MIN: CPT

## 2023-01-01 PROCEDURE — 71045 X-RAY EXAM CHEST 1 VIEW: CPT

## 2023-01-01 PROCEDURE — 73718 MRI LOWER EXTREMITY W/O DYE: CPT | Mod: 26,RT,MH

## 2023-01-01 PROCEDURE — 87086 URINE CULTURE/COLONY COUNT: CPT

## 2023-01-01 PROCEDURE — 85027 COMPLETE CBC AUTOMATED: CPT

## 2023-01-01 PROCEDURE — 76775 US EXAM ABDO BACK WALL LIM: CPT | Mod: 26

## 2023-01-01 PROCEDURE — 99285 EMERGENCY DEPT VISIT HI MDM: CPT

## 2023-01-01 PROCEDURE — 71046 X-RAY EXAM CHEST 2 VIEWS: CPT

## 2023-01-01 PROCEDURE — 99213 OFFICE O/P EST LOW 20 MIN: CPT | Mod: 25

## 2023-01-01 PROCEDURE — 80053 COMPREHEN METABOLIC PANEL: CPT

## 2023-01-01 PROCEDURE — 93923 UPR/LXTR ART STDY 3+ LVLS: CPT

## 2023-01-01 PROCEDURE — 87040 BLOOD CULTURE FOR BACTERIA: CPT

## 2023-01-01 PROCEDURE — 83935 ASSAY OF URINE OSMOLALITY: CPT

## 2023-01-01 PROCEDURE — 87186 SC STD MICRODIL/AGAR DIL: CPT

## 2023-01-01 PROCEDURE — 71045 X-RAY EXAM CHEST 1 VIEW: CPT | Mod: 26

## 2023-01-01 PROCEDURE — 85025 COMPLETE CBC W/AUTO DIFF WBC: CPT

## 2023-01-01 PROCEDURE — 93000 ELECTROCARDIOGRAM COMPLETE: CPT

## 2023-01-01 PROCEDURE — 73630 X-RAY EXAM OF FOOT: CPT

## 2023-01-01 PROCEDURE — 84484 ASSAY OF TROPONIN QUANT: CPT

## 2023-01-01 PROCEDURE — 84300 ASSAY OF URINE SODIUM: CPT

## 2023-01-01 PROCEDURE — 87150 DNA/RNA AMPLIFIED PROBE: CPT

## 2023-01-01 PROCEDURE — 99215 OFFICE O/P EST HI 40 MIN: CPT

## 2023-01-01 PROCEDURE — 99214 OFFICE O/P EST MOD 30 MIN: CPT

## 2023-01-01 PROCEDURE — 97161 PT EVAL LOW COMPLEX 20 MIN: CPT

## 2023-01-01 PROCEDURE — 83605 ASSAY OF LACTIC ACID: CPT

## 2023-01-01 PROCEDURE — 83735 ASSAY OF MAGNESIUM: CPT

## 2023-01-01 PROCEDURE — 82570 ASSAY OF URINE CREATININE: CPT

## 2023-01-01 PROCEDURE — 84156 ASSAY OF PROTEIN URINE: CPT

## 2023-01-01 PROCEDURE — 76775 US EXAM ABDO BACK WALL LIM: CPT

## 2023-01-01 PROCEDURE — 84540 ASSAY OF URINE/UREA-N: CPT

## 2023-01-01 PROCEDURE — 80048 BASIC METABOLIC PNL TOTAL CA: CPT

## 2023-01-01 PROCEDURE — 73630 X-RAY EXAM OF FOOT: CPT | Mod: 26,RT

## 2023-01-01 PROCEDURE — 74176 CT ABD & PELVIS W/O CONTRAST: CPT | Mod: 26,MA

## 2023-01-01 PROCEDURE — 85730 THROMBOPLASTIN TIME PARTIAL: CPT

## 2023-01-01 PROCEDURE — 96375 TX/PRO/DX INJ NEW DRUG ADDON: CPT

## 2023-01-01 PROCEDURE — 71046 X-RAY EXAM CHEST 2 VIEWS: CPT | Mod: 26

## 2023-01-01 PROCEDURE — 81001 URINALYSIS AUTO W/SCOPE: CPT

## 2023-01-01 PROCEDURE — 93005 ELECTROCARDIOGRAM TRACING: CPT

## 2023-01-01 PROCEDURE — 85610 PROTHROMBIN TIME: CPT

## 2023-01-01 RX ORDER — ADHESIVE TAPE 3"X 2.3 YD
50 MCG TAPE, NON-MEDICATED TOPICAL
Qty: 60 | Refills: 1 | Status: ACTIVE | COMMUNITY
Start: 2023-01-01

## 2023-01-01 RX ORDER — CEFTRIAXONE 500 MG/1
2000 INJECTION, POWDER, FOR SOLUTION INTRAMUSCULAR; INTRAVENOUS ONCE
Refills: 0 | Status: COMPLETED | OUTPATIENT
Start: 2023-01-01 | End: 2023-01-01

## 2023-01-01 RX ORDER — NITROFURANTOIN (MONOHYDRATE/MACROCRYSTALS) 25; 75 MG/1; MG/1
100 CAPSULE ORAL TWICE DAILY
Qty: 14 | Refills: 0 | Status: COMPLETED | COMMUNITY
Start: 2022-11-22 | End: 2023-01-01

## 2023-01-01 RX ORDER — HEPARIN SODIUM 5000 [USP'U]/ML
5000 INJECTION INTRAVENOUS; SUBCUTANEOUS EVERY 8 HOURS
Refills: 0 | Status: DISCONTINUED | OUTPATIENT
Start: 2023-01-01 | End: 2023-01-01

## 2023-01-01 RX ORDER — POTASSIUM CHLORIDE 20 MEQ
1 PACKET (EA) ORAL
Qty: 30 | Refills: 0
Start: 2023-01-01 | End: 2023-01-01

## 2023-01-01 RX ORDER — METOPROLOL TARTRATE 50 MG
100 TABLET ORAL DAILY
Refills: 0 | Status: DISCONTINUED | OUTPATIENT
Start: 2023-01-01 | End: 2023-01-01

## 2023-01-01 RX ORDER — METOPROLOL SUCCINATE 100 MG/1
100 TABLET, EXTENDED RELEASE ORAL DAILY
Qty: 90 | Refills: 3 | Status: ACTIVE | COMMUNITY
Start: 2023-04-18 | End: 1900-01-01

## 2023-01-01 RX ORDER — ONDANSETRON 8 MG/1
4 TABLET, FILM COATED ORAL ONCE
Refills: 0 | Status: COMPLETED | OUTPATIENT
Start: 2023-01-01 | End: 2023-01-01

## 2023-01-01 RX ORDER — HEPARIN SODIUM 5000 [USP'U]/ML
5000 INJECTION INTRAVENOUS; SUBCUTANEOUS EVERY 12 HOURS
Refills: 0 | Status: DISCONTINUED | OUTPATIENT
Start: 2023-01-01 | End: 2023-01-01

## 2023-01-01 RX ORDER — LACTOBACILLUS ACIDOPHILUS 100MM CELL
1 CAPSULE ORAL
Qty: 0 | Refills: 0 | DISCHARGE
Start: 2023-01-01

## 2023-01-01 RX ORDER — SODIUM CHLORIDE 9 MG/ML
1000 INJECTION, SOLUTION INTRAVENOUS
Refills: 0 | Status: DISCONTINUED | OUTPATIENT
Start: 2023-01-01 | End: 2023-01-01

## 2023-01-01 RX ORDER — INSULIN LISPRO 100/ML
VIAL (ML) SUBCUTANEOUS AT BEDTIME
Refills: 0 | Status: DISCONTINUED | OUTPATIENT
Start: 2023-01-01 | End: 2023-01-01

## 2023-01-01 RX ORDER — LEVOTHYROXINE SODIUM 125 MCG
75 TABLET ORAL DAILY
Refills: 0 | Status: DISCONTINUED | OUTPATIENT
Start: 2023-01-01 | End: 2023-01-01

## 2023-01-01 RX ORDER — LEVOTHYROXINE SODIUM 0.07 MG/1
75 TABLET ORAL
Qty: 30 | Refills: 0 | Status: DISCONTINUED | COMMUNITY
Start: 2023-04-18 | End: 2023-01-01

## 2023-01-01 RX ORDER — CHLORHEXIDINE GLUCONATE 4 %
325 (65 FE) LIQUID (ML) TOPICAL
Refills: 0 | Status: ACTIVE | COMMUNITY
Start: 2023-01-01

## 2023-01-01 RX ORDER — DEXTROSE 50 % IN WATER 50 %
15 SYRINGE (ML) INTRAVENOUS ONCE
Refills: 0 | Status: DISCONTINUED | OUTPATIENT
Start: 2023-01-01 | End: 2023-01-01

## 2023-01-01 RX ORDER — SODIUM CHLORIDE 9 MG/ML
1000 INJECTION INTRAMUSCULAR; INTRAVENOUS; SUBCUTANEOUS
Refills: 0 | Status: DISCONTINUED | OUTPATIENT
Start: 2023-01-01 | End: 2023-01-01

## 2023-01-01 RX ORDER — LACTOBACILLUS ACIDOPHILUS 100MM CELL
1 CAPSULE ORAL DAILY
Refills: 0 | Status: DISCONTINUED | OUTPATIENT
Start: 2023-01-01 | End: 2023-01-01

## 2023-01-01 RX ORDER — SODIUM CHLORIDE 9 MG/ML
250 INJECTION INTRAMUSCULAR; INTRAVENOUS; SUBCUTANEOUS ONCE
Refills: 0 | Status: COMPLETED | OUTPATIENT
Start: 2023-01-01 | End: 2023-01-01

## 2023-01-01 RX ORDER — ATORVASTATIN CALCIUM 80 MG/1
80 TABLET, FILM COATED ORAL AT BEDTIME
Refills: 0 | Status: DISCONTINUED | OUTPATIENT
Start: 2023-01-01 | End: 2023-01-01

## 2023-01-01 RX ORDER — CIPROFLOXACIN LACTATE 400MG/40ML
1 VIAL (ML) INTRAVENOUS
Qty: 10 | Refills: 0
Start: 2023-01-01 | End: 2023-01-01

## 2023-01-01 RX ORDER — LANOLIN ALCOHOL/MO/W.PET/CERES
3 CREAM (GRAM) TOPICAL AT BEDTIME
Refills: 0 | Status: DISCONTINUED | OUTPATIENT
Start: 2023-01-01 | End: 2023-01-01

## 2023-01-01 RX ORDER — DEXTROSE 50 % IN WATER 50 %
12.5 SYRINGE (ML) INTRAVENOUS ONCE
Refills: 0 | Status: DISCONTINUED | OUTPATIENT
Start: 2023-01-01 | End: 2023-01-01

## 2023-01-01 RX ORDER — PANTOPRAZOLE 40 MG/1
40 TABLET, DELAYED RELEASE ORAL
Qty: 28 | Refills: 0 | Status: COMPLETED | COMMUNITY
Start: 2022-04-20 | End: 2023-01-01

## 2023-01-01 RX ORDER — METOCLOPRAMIDE HCL 10 MG
10 TABLET ORAL ONCE
Refills: 0 | Status: COMPLETED | OUTPATIENT
Start: 2023-01-01 | End: 2023-01-01

## 2023-01-01 RX ORDER — DEXTROSE 50 % IN WATER 50 %
25 SYRINGE (ML) INTRAVENOUS ONCE
Refills: 0 | Status: DISCONTINUED | OUTPATIENT
Start: 2023-01-01 | End: 2023-01-01

## 2023-01-01 RX ORDER — EPLERENONE 25 MG/1
25 TABLET, COATED ORAL DAILY
Qty: 90 | Refills: 3 | Status: ACTIVE | COMMUNITY
Start: 2022-06-16

## 2023-01-01 RX ORDER — SACUBITRIL AND VALSARTAN 24; 26 MG/1; MG/1
24-26 TABLET, FILM COATED ORAL
Qty: 180 | Refills: 3 | Status: DISCONTINUED | COMMUNITY
Start: 2022-05-16 | End: 2023-01-01

## 2023-01-01 RX ORDER — INSULIN GLARGINE 100 [IU]/ML
37 INJECTION, SOLUTION SUBCUTANEOUS AT BEDTIME
Refills: 0 | Status: DISCONTINUED | OUTPATIENT
Start: 2023-01-01 | End: 2023-01-01

## 2023-01-01 RX ORDER — ASPIRIN/CALCIUM CARB/MAGNESIUM 324 MG
81 TABLET ORAL DAILY
Refills: 0 | Status: DISCONTINUED | OUTPATIENT
Start: 2023-01-01 | End: 2023-01-01

## 2023-01-01 RX ORDER — TAMSULOSIN HYDROCHLORIDE 0.4 MG/1
0.4 CAPSULE ORAL AT BEDTIME
Refills: 0 | Status: DISCONTINUED | OUTPATIENT
Start: 2023-01-01 | End: 2023-01-01

## 2023-01-01 RX ORDER — MULTIVITAMIN
TABLET ORAL DAILY
Qty: 30 | Refills: 0 | Status: ACTIVE | COMMUNITY
Start: 2023-01-01

## 2023-01-01 RX ORDER — INSULIN LISPRO 100/ML
VIAL (ML) SUBCUTANEOUS
Refills: 0 | Status: DISCONTINUED | OUTPATIENT
Start: 2023-01-01 | End: 2023-01-01

## 2023-01-01 RX ORDER — ACETAMINOPHEN 500 MG
650 TABLET ORAL EVERY 6 HOURS
Refills: 0 | Status: DISCONTINUED | OUTPATIENT
Start: 2023-01-01 | End: 2023-01-01

## 2023-01-01 RX ORDER — ONDANSETRON 8 MG/1
4 TABLET, FILM COATED ORAL EVERY 6 HOURS
Refills: 0 | Status: DISCONTINUED | OUTPATIENT
Start: 2023-01-01 | End: 2023-01-01

## 2023-01-01 RX ORDER — PANTOPRAZOLE SODIUM 20 MG/1
40 TABLET, DELAYED RELEASE ORAL ONCE
Refills: 0 | Status: COMPLETED | OUTPATIENT
Start: 2023-01-01 | End: 2023-01-01

## 2023-01-01 RX ORDER — ACETAMINOPHEN 500 MG
1000 TABLET ORAL ONCE
Refills: 0 | Status: COMPLETED | OUTPATIENT
Start: 2023-01-01 | End: 2023-01-01

## 2023-01-01 RX ORDER — POTASSIUM CHLORIDE 750 MG/1
10 CAPSULE, EXTENDED RELEASE ORAL DAILY
Refills: 3 | Status: ACTIVE | COMMUNITY
Start: 2022-04-28

## 2023-01-01 RX ORDER — GLUCAGON INJECTION, SOLUTION 0.5 MG/.1ML
1 INJECTION, SOLUTION SUBCUTANEOUS ONCE
Refills: 0 | Status: DISCONTINUED | OUTPATIENT
Start: 2023-01-01 | End: 2023-01-01

## 2023-01-01 RX ORDER — CEFTRIAXONE 500 MG/1
2000 INJECTION, POWDER, FOR SOLUTION INTRAMUSCULAR; INTRAVENOUS EVERY 24 HOURS
Refills: 0 | Status: DISCONTINUED | OUTPATIENT
Start: 2023-01-01 | End: 2023-01-01

## 2023-01-01 RX ADMIN — Medication 75 MICROGRAM(S): at 05:23

## 2023-01-01 RX ADMIN — Medication 75 MICROGRAM(S): at 05:32

## 2023-01-01 RX ADMIN — HEPARIN SODIUM 5000 UNIT(S): 5000 INJECTION INTRAVENOUS; SUBCUTANEOUS at 14:33

## 2023-01-01 RX ADMIN — Medication 75 MICROGRAM(S): at 05:56

## 2023-01-01 RX ADMIN — Medication 400 MILLIGRAM(S): at 10:21

## 2023-01-01 RX ADMIN — Medication 75 MICROGRAM(S): at 05:18

## 2023-01-01 RX ADMIN — INSULIN GLARGINE 37 UNIT(S): 100 INJECTION, SOLUTION SUBCUTANEOUS at 21:46

## 2023-01-01 RX ADMIN — Medication 1: at 07:47

## 2023-01-01 RX ADMIN — SODIUM CHLORIDE 250 MILLILITER(S): 9 INJECTION INTRAMUSCULAR; INTRAVENOUS; SUBCUTANEOUS at 11:44

## 2023-01-01 RX ADMIN — ONDANSETRON 4 MILLIGRAM(S): 8 TABLET, FILM COATED ORAL at 09:54

## 2023-01-01 RX ADMIN — SODIUM CHLORIDE 84 MILLILITER(S): 9 INJECTION INTRAMUSCULAR; INTRAVENOUS; SUBCUTANEOUS at 10:04

## 2023-01-01 RX ADMIN — TAMSULOSIN HYDROCHLORIDE 0.4 MILLIGRAM(S): 0.4 CAPSULE ORAL at 21:40

## 2023-01-01 RX ADMIN — CEFTRIAXONE 100 MILLIGRAM(S): 500 INJECTION, POWDER, FOR SOLUTION INTRAMUSCULAR; INTRAVENOUS at 11:02

## 2023-01-01 RX ADMIN — CEFTRIAXONE 100 MILLIGRAM(S): 500 INJECTION, POWDER, FOR SOLUTION INTRAMUSCULAR; INTRAVENOUS at 12:07

## 2023-01-01 RX ADMIN — INSULIN GLARGINE 37 UNIT(S): 100 INJECTION, SOLUTION SUBCUTANEOUS at 21:20

## 2023-01-01 RX ADMIN — SODIUM CHLORIDE 75 MILLILITER(S): 9 INJECTION, SOLUTION INTRAVENOUS at 01:12

## 2023-01-01 RX ADMIN — Medication 1 TABLET(S): at 21:43

## 2023-01-01 RX ADMIN — Medication 2: at 17:13

## 2023-01-01 RX ADMIN — Medication 81 MILLIGRAM(S): at 12:07

## 2023-01-01 RX ADMIN — TAMSULOSIN HYDROCHLORIDE 0.4 MILLIGRAM(S): 0.4 CAPSULE ORAL at 21:00

## 2023-01-01 RX ADMIN — Medication 81 MILLIGRAM(S): at 11:17

## 2023-01-01 RX ADMIN — Medication 2: at 17:20

## 2023-01-01 RX ADMIN — HEPARIN SODIUM 5000 UNIT(S): 5000 INJECTION INTRAVENOUS; SUBCUTANEOUS at 13:00

## 2023-01-01 RX ADMIN — INSULIN GLARGINE 37 UNIT(S): 100 INJECTION, SOLUTION SUBCUTANEOUS at 21:40

## 2023-01-01 RX ADMIN — HEPARIN SODIUM 5000 UNIT(S): 5000 INJECTION INTRAVENOUS; SUBCUTANEOUS at 05:56

## 2023-01-01 RX ADMIN — CEFTRIAXONE 100 MILLIGRAM(S): 500 INJECTION, POWDER, FOR SOLUTION INTRAMUSCULAR; INTRAVENOUS at 11:23

## 2023-01-01 RX ADMIN — Medication 81 MILLIGRAM(S): at 12:58

## 2023-01-01 RX ADMIN — CEFTRIAXONE 100 MILLIGRAM(S): 500 INJECTION, POWDER, FOR SOLUTION INTRAMUSCULAR; INTRAVENOUS at 11:29

## 2023-01-01 RX ADMIN — ATORVASTATIN CALCIUM 80 MILLIGRAM(S): 80 TABLET, FILM COATED ORAL at 21:20

## 2023-01-01 RX ADMIN — Medication 2: at 12:26

## 2023-01-01 RX ADMIN — Medication 100 MILLIGRAM(S): at 05:32

## 2023-01-01 RX ADMIN — Medication 3 MILLIGRAM(S): at 22:18

## 2023-01-01 RX ADMIN — Medication 81 MILLIGRAM(S): at 11:29

## 2023-01-01 RX ADMIN — HEPARIN SODIUM 5000 UNIT(S): 5000 INJECTION INTRAVENOUS; SUBCUTANEOUS at 05:32

## 2023-01-01 RX ADMIN — Medication 2: at 17:14

## 2023-01-01 RX ADMIN — HEPARIN SODIUM 5000 UNIT(S): 5000 INJECTION INTRAVENOUS; SUBCUTANEOUS at 17:13

## 2023-01-01 RX ADMIN — HEPARIN SODIUM 5000 UNIT(S): 5000 INJECTION INTRAVENOUS; SUBCUTANEOUS at 05:24

## 2023-01-01 RX ADMIN — HEPARIN SODIUM 5000 UNIT(S): 5000 INJECTION INTRAVENOUS; SUBCUTANEOUS at 17:14

## 2023-01-01 RX ADMIN — ATORVASTATIN CALCIUM 80 MILLIGRAM(S): 80 TABLET, FILM COATED ORAL at 22:17

## 2023-01-01 RX ADMIN — Medication 1: at 07:39

## 2023-01-01 RX ADMIN — Medication 10 MILLIGRAM(S): at 10:48

## 2023-01-01 RX ADMIN — SODIUM CHLORIDE 250 MILLILITER(S): 9 INJECTION INTRAMUSCULAR; INTRAVENOUS; SUBCUTANEOUS at 09:54

## 2023-01-01 RX ADMIN — TAMSULOSIN HYDROCHLORIDE 0.4 MILLIGRAM(S): 0.4 CAPSULE ORAL at 21:20

## 2023-01-01 RX ADMIN — HEPARIN SODIUM 5000 UNIT(S): 5000 INJECTION INTRAVENOUS; SUBCUTANEOUS at 05:18

## 2023-01-01 RX ADMIN — Medication 1: at 18:59

## 2023-01-01 RX ADMIN — PANTOPRAZOLE SODIUM 40 MILLIGRAM(S): 20 TABLET, DELAYED RELEASE ORAL at 10:48

## 2023-01-01 RX ADMIN — Medication 3: at 14:36

## 2023-01-01 RX ADMIN — CEFTRIAXONE 100 MILLIGRAM(S): 500 INJECTION, POWDER, FOR SOLUTION INTRAMUSCULAR; INTRAVENOUS at 12:58

## 2023-01-01 RX ADMIN — HEPARIN SODIUM 5000 UNIT(S): 5000 INJECTION INTRAVENOUS; SUBCUTANEOUS at 21:40

## 2023-01-01 RX ADMIN — ATORVASTATIN CALCIUM 80 MILLIGRAM(S): 80 TABLET, FILM COATED ORAL at 21:40

## 2023-01-01 RX ADMIN — INSULIN GLARGINE 37 UNIT(S): 100 INJECTION, SOLUTION SUBCUTANEOUS at 22:27

## 2023-01-01 RX ADMIN — Medication 3: at 12:07

## 2023-01-01 RX ADMIN — Medication 1 TABLET(S): at 12:58

## 2023-01-01 RX ADMIN — ATORVASTATIN CALCIUM 80 MILLIGRAM(S): 80 TABLET, FILM COATED ORAL at 21:00

## 2023-01-20 NOTE — H&P CARDIOLOGY - BIRTH SEX
Behavioral Health Psychotherapy Progress Note    Psychotherapy Provided: Individual Psychotherapy     1  Generalized anxiety disorder        2  Attention-deficit hyperactivity disorder, predominantly inattentive type        3  Moderate episode of recurrent major depressive disorder ([address] )        4  Thoughts of self harm            Goals addressed in session: Goal 1     DATA:   -CT reported that classes have started  and this has changed her time and she noted that she has less free time  -CT believes this is doable and will get used to it  CT reported that her communications class is the 1 class she is the least excited about  CT feels confident about managing the 4 classes  -CT reported that things escalated with her ex  She last told him that she will be calling the police and filing a PFA if he makes physical visit to home or work  He has stopped and CT has fully blocked him on all accounts  -CT reported that at home brother (not going to school and fighting with Emmie Gutierrez and mother) CT stated that she stays away form him and this working for the most part  CT stays busy and does not have to be around him    -Discussed time and work management for school work  TH and CT discussed various organization tools and what works for her  CT does have systems she has used in the past and have helped  -CT talked about her uncomfortableness when she has to write about herself or complete the beginning introductions in class  CT completed it and it is behind her now  She will have to do some group work which can make her uncomfortable  -CT stated the organization needed to complete her school classes  -Reviewed and updated Crisis Plan (Credible)    During this session, this clinician used the following therapeutic modalities: Client-centered Therapy and Supportive Psychotherapy    Substance Abuse was not addressed during this session   If the client is diagnosed with a co-occurring substance use disorder, please indicate any changes in the frequency or amount of use:   Stage of change for addressing substance use diagnoses: No substance use/Not applicable    ASSESSMENT:  Hira Ibarra presents with a Euthymic/ normal mood  her affect is Normal range and intensity, which is congruent, with her mood and the content of the session  The client has made progress on their goals  Hira Ibarra presents with a none risk of suicide, none risk of self-harm, and none risk of harm to others  For any risk assessment that surpasses a "low" rating, a safety plan must be developed  A safety plan was indicated: no  If yes, describe in detail     PLAN: Between sessions, Hira Ibarra will use organization tools (calendar, to do lists) to prepare for and complete school semester successfully  At the next session, the therapist will use Client-centered Therapy, Motivational Interviewing and Supportive Psychotherapy to monitor transition to schedule to include college classes       Behavioral Health Treatment Plan and Discharge Planning: Hira Ibarra is aware of and agrees to continue to work on their treatment plan  They have identified and are working toward their discharge goals   yes    Visit start and stop times:    01/20/23  Start Time: 1005  Stop Time: 1100  Total Visit Time: 55 minutes    Virtual Regular Visit    Verification of patient location:    Patient is located in the following state in which I hold an active license [place]      Assessment/Plan:    Problem List Items Addressed This Visit        Other    Generalized anxiety disorder - Primary    Thoughts of self harm    Moderate episode of recurrent major depressive disorder ([facility] [address] )    Attention-deficit hyperactivity disorder, predominantly inattentive type       Goals addressed in session: Goal 1          Reason for visit is   Chief Complaint   Patient presents with   • Virtual Regular Visit        Encounter provider Lorena Alfred, [facility]    Provider located at [facility] [address] [facility] OUTPATIENT  [address]  [phone]      Recent Visits  Date Type Provider Dept   01/13/23 Telemedicine James Mckeon, [address] [facility]   Showing recent visits within past 7 days and meeting all other requirements  Today's Visits  Date Type Provider Dept   01/20/23 Telemedicine James Mckeon, [address] [facility] The[facility]   Showing today's visits and meeting all other requirements  Future Appointments  No visits were found meeting these conditions  Showing future appointments within next 150 days and meeting all other requirements       The patient was identified by name and date of birth  Agnieszka Ramachandran was informed that this is a telemedicine visit and that the visit is being conducted throughthe [employer] platform  She agrees to proceed     My office door was closed  No one else was in the room  She acknowledged consent and understanding of privacy and security of the video platform  The patient has agreed to participate and understands they can discontinue the visit at any time  Patient is aware this is a billable service  Karina Morales is a 26 y o  female          HPI     Past Medical History:   Diagnosis Date   • Anxiety    • Asthma    • Depression        Past Surgical History:   Procedure Laterality Date   • WISDOM TOOTH EXTRACTION         Current Outpatient Medications   Medication Sig Dispense Refill   • FLUoxetine (PROzac) 10 mg capsule Take 1 capsule (10 mg total) by mouth daily With a 40mg cap for total daily dose of 50mg 30 capsule 1   • FLUoxetine (PROzac) 40 MG capsule Take 1 capsule (40 mg total) by mouth in the morning 90 capsule 0   • guanFACINE HCl ER (INTUNIV) 4 MG TB24 Take 1 tablet (4 mg total) by mouth daily at bedtime for 90 doses 90 tablet 0   • levonorgestrel-ethinyl estradiol (Chapis) 90-20 MCG per tablet Take 1 tablet by mouth daily 90 tablet 3   • levonorgestrel-ethinyl estradiol (Chapis) 90-20 MCG per tablet Take 1 tablet by mouth daily 90 tablet 1   • methylphenidate (Concerta) 54 MG ER tablet Take 1 tablet (54 mg total) by mouth daily Max Daily Amount: 54 mg 30 tablet 0   • methylphenidate (Concerta) 54 MG ER tablet Take 1 tablet (54 mg total) by mouth daily Max Daily Amount: 54 mg 30 tablet 0   • naltrexone (REVIA) 50 mg tablet Take 0 5 tablets (25 mg total) by mouth daily for 3 days, THEN 1 tablet (50 mg total) daily  8 tablet 0   • propantheline (PROBANTHINE) 15 mg tablet Take 15 mg by mouth 4 (four) times a day   (Patient not taking: Reported on 9/21/2022)       No current facility-administered medications for this visit  Allergies   Allergen Reactions   • Benadryl [Diphenhydramine] Hyperactivity   • Mangifera Indica Itching   • Pineapple - Food Allergy Itching       Review of Systems    Video Exam    There were no vitals filed for this visit      Physical Exam Female

## 2023-02-16 NOTE — PLAN
[FreeTextEntry1] : Patient happy with the results , patient discharged with full instructions Spent 20 minutes for patient care and medical decision making.\par  Topical Ketoconazole Counseling: Patient counseled that this medication may cause skin irritation or allergic reactions.  In the event of skin irritation, the patient was advised to reduce the amount of the drug applied or use it less frequently.   The patient verbalized understanding of the proper use and possible adverse effects of ketoconazole.  All of the patient's questions and concerns were addressed.

## 2023-03-06 NOTE — PROGRESS NOTE ADULT - PROBLEM SELECTOR PROBLEM 4
The patient is Stable - Low risk of patient condition declining or worsening    Shift Goals  Clinical Goals: Wean oxygen, mobilize  Patient Goals: Eat, visit with caregiver  Family Goals: Receive update    Progress made toward(s) clinical / shift goals:    Problem: Communication  Goal: The ability to communicate needs accurately and effectively will improve  Outcome: Progressing  Flowsheets (Taken 3/6/2023 1039)  Communication:   Assessed patient's ability to understand and communicate   Oriented patient to call light   Oriented family/support system to call light   Reoriented patient to environment     Problem: Discharge Barriers/Planning  Goal: Patient's continuum of care needs are met  Outcome: Progressing  Flowsheets (Taken 3/6/2023 1039)  Continuum of Care Needs:   Assessed for discharge barriers   Communicated discharge barriers to interdisciplinary tream   Involved patient/family/support system in discharge process   Collaborated with Case Management/       Patient is not progressing towards the following goals:      
Hypertension

## 2023-04-18 ENCOUNTER — RX RENEWAL (OUTPATIENT)
Age: 73
End: 2023-04-18

## 2023-04-20 ENCOUNTER — EMERGENCY (EMERGENCY)
Facility: HOSPITAL | Age: 73
LOS: 1 days | Discharge: HOME | End: 2023-04-20
Attending: EMERGENCY MEDICINE | Admitting: EMERGENCY MEDICINE
Payer: MEDICARE

## 2023-04-20 VITALS
HEART RATE: 91 BPM | HEIGHT: 71 IN | DIASTOLIC BLOOD PRESSURE: 58 MMHG | RESPIRATION RATE: 20 BRPM | OXYGEN SATURATION: 96 % | TEMPERATURE: 97 F | SYSTOLIC BLOOD PRESSURE: 126 MMHG | WEIGHT: 293 LBS

## 2023-04-20 DIAGNOSIS — M75.00 ADHESIVE CAPSULITIS OF UNSPECIFIED SHOULDER: Chronic | ICD-10-CM

## 2023-04-20 DIAGNOSIS — Z98.890 OTHER SPECIFIED POSTPROCEDURAL STATES: Chronic | ICD-10-CM

## 2023-04-20 DIAGNOSIS — Z86.69 PERSONAL HISTORY OF OTHER DISEASES OF THE NERVOUS SYSTEM AND SENSE ORGANS: Chronic | ICD-10-CM

## 2023-04-20 DIAGNOSIS — S02.91XA UNSPECIFIED FRACTURE OF SKULL, INITIAL ENCOUNTER FOR CLOSED FRACTURE: Chronic | ICD-10-CM

## 2023-04-20 LAB
ALBUMIN SERPL ELPH-MCNC: 3.2 G/DL — LOW (ref 3.3–5)
ALP SERPL-CCNC: 95 U/L — SIGNIFICANT CHANGE UP (ref 40–120)
ALT FLD-CCNC: 22 U/L — SIGNIFICANT CHANGE UP (ref 12–78)
ANION GAP SERPL CALC-SCNC: 4 MMOL/L — LOW (ref 5–17)
APPEARANCE UR: ABNORMAL
AST SERPL-CCNC: 17 U/L — SIGNIFICANT CHANGE UP (ref 15–37)
BACTERIA # UR AUTO: ABNORMAL
BASOPHILS # BLD AUTO: 0.08 K/UL — SIGNIFICANT CHANGE UP (ref 0–0.2)
BASOPHILS NFR BLD AUTO: 1 % — SIGNIFICANT CHANGE UP (ref 0–2)
BILIRUB SERPL-MCNC: 0.5 MG/DL — SIGNIFICANT CHANGE UP (ref 0.2–1.2)
BILIRUB UR-MCNC: NEGATIVE — SIGNIFICANT CHANGE UP
BUN SERPL-MCNC: 49 MG/DL — HIGH (ref 7–23)
CALCIUM SERPL-MCNC: 9 MG/DL — SIGNIFICANT CHANGE UP (ref 8.5–10.1)
CHLORIDE SERPL-SCNC: 99 MMOL/L — SIGNIFICANT CHANGE UP (ref 96–108)
CO2 SERPL-SCNC: 31 MMOL/L — SIGNIFICANT CHANGE UP (ref 22–31)
COLOR SPEC: SIGNIFICANT CHANGE UP
COMMENT - URINE: SIGNIFICANT CHANGE UP
CREAT SERPL-MCNC: 2 MG/DL — HIGH (ref 0.5–1.3)
DIFF PNL FLD: ABNORMAL
EGFR: 26 ML/MIN/1.73M2 — LOW
EOSINOPHIL # BLD AUTO: 0.24 K/UL — SIGNIFICANT CHANGE UP (ref 0–0.5)
EOSINOPHIL NFR BLD AUTO: 2.9 % — SIGNIFICANT CHANGE UP (ref 0–6)
EPI CELLS # UR: NEGATIVE — SIGNIFICANT CHANGE UP
GLUCOSE SERPL-MCNC: 239 MG/DL — HIGH (ref 70–99)
GLUCOSE UR QL: NEGATIVE — SIGNIFICANT CHANGE UP
HCT VFR BLD CALC: 36.2 % — SIGNIFICANT CHANGE UP (ref 34.5–45)
HGB BLD-MCNC: 11.6 G/DL — SIGNIFICANT CHANGE UP (ref 11.5–15.5)
IMM GRANULOCYTES NFR BLD AUTO: 0.7 % — SIGNIFICANT CHANGE UP (ref 0–0.9)
KETONES UR-MCNC: NEGATIVE — SIGNIFICANT CHANGE UP
LEUKOCYTE ESTERASE UR-ACNC: ABNORMAL
LYMPHOCYTES # BLD AUTO: 0.72 K/UL — LOW (ref 1–3.3)
LYMPHOCYTES # BLD AUTO: 8.6 % — LOW (ref 13–44)
MCHC RBC-ENTMCNC: 29.3 PG — SIGNIFICANT CHANGE UP (ref 27–34)
MCHC RBC-ENTMCNC: 32 GM/DL — SIGNIFICANT CHANGE UP (ref 32–36)
MCV RBC AUTO: 91.4 FL — SIGNIFICANT CHANGE UP (ref 80–100)
MONOCYTES # BLD AUTO: 0.57 K/UL — SIGNIFICANT CHANGE UP (ref 0–0.9)
MONOCYTES NFR BLD AUTO: 6.8 % — SIGNIFICANT CHANGE UP (ref 2–14)
NEUTROPHILS # BLD AUTO: 6.72 K/UL — SIGNIFICANT CHANGE UP (ref 1.8–7.4)
NEUTROPHILS NFR BLD AUTO: 80 % — HIGH (ref 43–77)
NITRITE UR-MCNC: POSITIVE
NRBC # BLD: 0 /100 WBCS — SIGNIFICANT CHANGE UP (ref 0–0)
PH UR: 6 — SIGNIFICANT CHANGE UP (ref 5–8)
PLATELET # BLD AUTO: 275 K/UL — SIGNIFICANT CHANGE UP (ref 150–400)
POTASSIUM SERPL-MCNC: 4 MMOL/L — SIGNIFICANT CHANGE UP (ref 3.5–5.3)
POTASSIUM SERPL-SCNC: 4 MMOL/L — SIGNIFICANT CHANGE UP (ref 3.5–5.3)
PROT SERPL-MCNC: 8.4 G/DL — HIGH (ref 6–8.3)
PROT UR-MCNC: 30 MG/DL
RBC # BLD: 3.96 M/UL — SIGNIFICANT CHANGE UP (ref 3.8–5.2)
RBC # FLD: 13.9 % — SIGNIFICANT CHANGE UP (ref 10.3–14.5)
RBC CASTS # UR COMP ASSIST: ABNORMAL /HPF (ref 0–4)
SODIUM SERPL-SCNC: 134 MMOL/L — LOW (ref 135–145)
SP GR SPEC: 1.01 — SIGNIFICANT CHANGE UP (ref 1.01–1.02)
UROBILINOGEN FLD QL: NEGATIVE — SIGNIFICANT CHANGE UP
WBC # BLD: 8.39 K/UL — SIGNIFICANT CHANGE UP (ref 3.8–10.5)
WBC # FLD AUTO: 8.39 K/UL — SIGNIFICANT CHANGE UP (ref 3.8–10.5)
WBC UR QL: >50

## 2023-04-20 PROCEDURE — 36415 COLL VENOUS BLD VENIPUNCTURE: CPT

## 2023-04-20 PROCEDURE — 99284 EMERGENCY DEPT VISIT MOD MDM: CPT

## 2023-04-20 PROCEDURE — 99284 EMERGENCY DEPT VISIT MOD MDM: CPT | Mod: 25

## 2023-04-20 PROCEDURE — 96374 THER/PROPH/DIAG INJ IV PUSH: CPT

## 2023-04-20 PROCEDURE — 87186 SC STD MICRODIL/AGAR DIL: CPT

## 2023-04-20 PROCEDURE — 80053 COMPREHEN METABOLIC PANEL: CPT

## 2023-04-20 PROCEDURE — G1004: CPT

## 2023-04-20 PROCEDURE — 74176 CT ABD & PELVIS W/O CONTRAST: CPT | Mod: ME

## 2023-04-20 PROCEDURE — 85025 COMPLETE CBC W/AUTO DIFF WBC: CPT

## 2023-04-20 PROCEDURE — 87086 URINE CULTURE/COLONY COUNT: CPT

## 2023-04-20 PROCEDURE — 74176 CT ABD & PELVIS W/O CONTRAST: CPT | Mod: 26,ME

## 2023-04-20 PROCEDURE — 81001 URINALYSIS AUTO W/SCOPE: CPT

## 2023-04-20 RX ORDER — CEFTRIAXONE 500 MG/1
1000 INJECTION, POWDER, FOR SOLUTION INTRAMUSCULAR; INTRAVENOUS ONCE
Refills: 0 | Status: COMPLETED | OUTPATIENT
Start: 2023-04-20 | End: 2023-04-20

## 2023-04-20 RX ORDER — SODIUM CHLORIDE 9 MG/ML
1000 INJECTION INTRAMUSCULAR; INTRAVENOUS; SUBCUTANEOUS ONCE
Refills: 0 | Status: COMPLETED | OUTPATIENT
Start: 2023-04-20 | End: 2023-04-20

## 2023-04-20 RX ADMIN — CEFTRIAXONE 100 MILLIGRAM(S): 500 INJECTION, POWDER, FOR SOLUTION INTRAMUSCULAR; INTRAVENOUS at 09:26

## 2023-04-20 RX ADMIN — SODIUM CHLORIDE 1000 MILLILITER(S): 9 INJECTION INTRAMUSCULAR; INTRAVENOUS; SUBCUTANEOUS at 09:29

## 2023-04-20 RX ADMIN — SODIUM CHLORIDE 1000 MILLILITER(S): 9 INJECTION INTRAMUSCULAR; INTRAVENOUS; SUBCUTANEOUS at 08:50

## 2023-04-20 NOTE — ED PROVIDER NOTE - PATIENT PORTAL LINK FT
You can access the FollowMyHealth Patient Portal offered by [facility] by registering at the following website: [email or web address]. By joining [facility]’s FollowMyHealth portal, you will also be able to view your health information using other applications (apps) compatible with our system.

## 2023-04-20 NOTE — ED ADULT NURSE NOTE - OBJECTIVE STATEMENT
Pt has been having dark and concentrated urine over the past few days and has not gotten any better even though she has been increasing her water intake.  This morning when patient went to urinate it was bloody so she decided to come in for an evaluation.

## 2023-04-20 NOTE — ED PROVIDER NOTE - IV ALTEPLASE DOOR HIDDEN
[Normal] : oriented to person, place and time, the affect was normal, the mood was normal and not anxious show

## 2023-04-20 NOTE — ED ADULT TRIAGE NOTE - CHIEF COMPLAINT QUOTE
I have noticed dark urine over the past few days, but absolutely no pain, I feel fine.  but this am, I woke up to use restroom and my urine was red.  I figured I should check it out.  takes asa daily

## 2023-04-20 NOTE — ED PROVIDER NOTE - OBJECTIVE STATEMENT
71yo female with hematuria this morning, pt states she noticed her urine darker than normal and woke up today with blood, no pain no flank pain no nausea or ovmiting, no fever, chills, no hx of kidney stone

## 2023-04-23 LAB
-  AMIKACIN: SIGNIFICANT CHANGE UP
-  AMOXICILLIN/CLAVULANIC ACID: SIGNIFICANT CHANGE UP
-  AMPICILLIN/SULBACTAM: SIGNIFICANT CHANGE UP
-  AMPICILLIN: SIGNIFICANT CHANGE UP
-  AZTREONAM: SIGNIFICANT CHANGE UP
-  CEFAZOLIN: SIGNIFICANT CHANGE UP
-  CEFEPIME: SIGNIFICANT CHANGE UP
-  CEFOXITIN: SIGNIFICANT CHANGE UP
-  CEFTRIAXONE: SIGNIFICANT CHANGE UP
-  CEFUROXIME: SIGNIFICANT CHANGE UP
-  CIPROFLOXACIN: SIGNIFICANT CHANGE UP
-  ERTAPENEM: SIGNIFICANT CHANGE UP
-  GENTAMICIN: SIGNIFICANT CHANGE UP
-  IMIPENEM: SIGNIFICANT CHANGE UP
-  LEVOFLOXACIN: SIGNIFICANT CHANGE UP
-  MEROPENEM: SIGNIFICANT CHANGE UP
-  NITROFURANTOIN: SIGNIFICANT CHANGE UP
-  PIPERACILLIN/TAZOBACTAM: SIGNIFICANT CHANGE UP
-  TOBRAMYCIN: SIGNIFICANT CHANGE UP
-  TRIMETHOPRIM/SULFAMETHOXAZOLE: SIGNIFICANT CHANGE UP
CULTURE RESULTS: SIGNIFICANT CHANGE UP
METHOD TYPE: SIGNIFICANT CHANGE UP
ORGANISM # SPEC MICROSCOPIC CNT: SIGNIFICANT CHANGE UP
ORGANISM # SPEC MICROSCOPIC CNT: SIGNIFICANT CHANGE UP
SPECIMEN SOURCE: SIGNIFICANT CHANGE UP

## 2023-05-31 ENCOUNTER — NON-APPOINTMENT (OUTPATIENT)
Age: 73
End: 2023-05-31

## 2023-05-31 ENCOUNTER — APPOINTMENT (OUTPATIENT)
Dept: CARDIOLOGY | Facility: CLINIC | Age: 73
End: 2023-05-31
Payer: MEDICARE

## 2023-05-31 VITALS
HEART RATE: 72 BPM | OXYGEN SATURATION: 95 % | WEIGHT: 293 LBS | DIASTOLIC BLOOD PRESSURE: 72 MMHG | SYSTOLIC BLOOD PRESSURE: 117 MMHG | HEIGHT: 71 IN | BODY MASS INDEX: 41.02 KG/M2

## 2023-05-31 PROCEDURE — 99214 OFFICE O/P EST MOD 30 MIN: CPT

## 2023-05-31 PROCEDURE — 93000 ELECTROCARDIOGRAM COMPLETE: CPT

## 2023-05-31 RX ORDER — LEVOTHYROXINE SODIUM 25 UG/1
25 TABLET ORAL
Qty: 90 | Refills: 0 | Status: DISCONTINUED | COMMUNITY
Start: 2022-08-30 | End: 2023-05-31

## 2023-06-03 ENCOUNTER — INPATIENT (INPATIENT)
Facility: HOSPITAL | Age: 73
LOS: 15 days | Discharge: HOME | DRG: 871 | End: 2023-06-19
Attending: FAMILY MEDICINE | Admitting: FAMILY MEDICINE
Payer: MEDICARE

## 2023-06-03 VITALS
HEIGHT: 71 IN | SYSTOLIC BLOOD PRESSURE: 89 MMHG | WEIGHT: 293 LBS | OXYGEN SATURATION: 100 % | DIASTOLIC BLOOD PRESSURE: 52 MMHG | HEART RATE: 136 BPM | TEMPERATURE: 98 F | RESPIRATION RATE: 20 BRPM

## 2023-06-03 DIAGNOSIS — M75.00 ADHESIVE CAPSULITIS OF UNSPECIFIED SHOULDER: Chronic | ICD-10-CM

## 2023-06-03 DIAGNOSIS — Z98.890 OTHER SPECIFIED POSTPROCEDURAL STATES: Chronic | ICD-10-CM

## 2023-06-03 DIAGNOSIS — S02.91XA UNSPECIFIED FRACTURE OF SKULL, INITIAL ENCOUNTER FOR CLOSED FRACTURE: Chronic | ICD-10-CM

## 2023-06-03 DIAGNOSIS — Z86.69 PERSONAL HISTORY OF OTHER DISEASES OF THE NERVOUS SYSTEM AND SENSE ORGANS: Chronic | ICD-10-CM

## 2023-06-03 PROCEDURE — 99285 EMERGENCY DEPT VISIT HI MDM: CPT

## 2023-06-03 NOTE — ED ADULT TRIAGE NOTE - CHIEF COMPLAINT QUOTE
Patient in wheelchair to triage due to vomiting since Friday.  Patient has had two pieces of toast.  Patient actively vomiting in triage. Patient reports a very loose bowel movement just prior to arrival.  Blood pressure noted as low.  Pulse noted as high.  Temp normal.

## 2023-06-04 DIAGNOSIS — A41.9 SEPSIS, UNSPECIFIED ORGANISM: ICD-10-CM

## 2023-06-04 DIAGNOSIS — N13.30 UNSPECIFIED HYDRONEPHROSIS: ICD-10-CM

## 2023-06-04 DIAGNOSIS — N39.0 URINARY TRACT INFECTION, SITE NOT SPECIFIED: ICD-10-CM

## 2023-06-04 LAB
ACETONE SERPL-MCNC: NEGATIVE — SIGNIFICANT CHANGE UP
ALBUMIN SERPL ELPH-MCNC: 2.9 G/DL — LOW (ref 3.3–5)
ALP SERPL-CCNC: 89 U/L — SIGNIFICANT CHANGE UP (ref 40–120)
ALT FLD-CCNC: 22 U/L — SIGNIFICANT CHANGE UP (ref 12–78)
ANION GAP SERPL CALC-SCNC: 13 MMOL/L — SIGNIFICANT CHANGE UP (ref 5–17)
APPEARANCE UR: ABNORMAL
AST SERPL-CCNC: 38 U/L — HIGH (ref 15–37)
BASOPHILS # BLD AUTO: 0 K/UL — SIGNIFICANT CHANGE UP (ref 0–0.2)
BASOPHILS NFR BLD AUTO: 0 % — SIGNIFICANT CHANGE UP (ref 0–2)
BILIRUB SERPL-MCNC: 0.9 MG/DL — SIGNIFICANT CHANGE UP (ref 0.2–1.2)
BILIRUB UR-MCNC: NEGATIVE — SIGNIFICANT CHANGE UP
BUN SERPL-MCNC: 91 MG/DL — HIGH (ref 7–23)
CALCIUM SERPL-MCNC: 9 MG/DL — SIGNIFICANT CHANGE UP (ref 8.5–10.1)
CHLORIDE SERPL-SCNC: 89 MMOL/L — LOW (ref 96–108)
CO2 SERPL-SCNC: 25 MMOL/L — SIGNIFICANT CHANGE UP (ref 22–31)
COLOR SPEC: SIGNIFICANT CHANGE UP
CREAT SERPL-MCNC: 4.3 MG/DL — HIGH (ref 0.5–1.3)
DIFF PNL FLD: ABNORMAL
E COLI DNA BLD POS QL NAA+NON-PROBE: SIGNIFICANT CHANGE UP
EGFR: 10 ML/MIN/1.73M2 — LOW
EOSINOPHIL # BLD AUTO: 0 K/UL — SIGNIFICANT CHANGE UP (ref 0–0.5)
EOSINOPHIL NFR BLD AUTO: 0 % — SIGNIFICANT CHANGE UP (ref 0–6)
GLUCOSE SERPL-MCNC: 333 MG/DL — HIGH (ref 70–99)
GLUCOSE UR QL: NEGATIVE MG/DL — SIGNIFICANT CHANGE UP
GRAM STN FLD: SIGNIFICANT CHANGE UP
HCT VFR BLD CALC: 34.6 % — SIGNIFICANT CHANGE UP (ref 34.5–45)
HGB BLD-MCNC: 11.4 G/DL — LOW (ref 11.5–15.5)
KETONES UR-MCNC: ABNORMAL MG/DL
LACTATE SERPL-SCNC: 2.4 MMOL/L — HIGH (ref 0.7–2)
LACTATE SERPL-SCNC: 3.6 MMOL/L — HIGH (ref 0.7–2)
LEUKOCYTE ESTERASE UR-ACNC: ABNORMAL
LIDOCAIN IGE QN: 35 U/L — LOW (ref 73–393)
LYMPHOCYTES # BLD AUTO: 1.05 K/UL — SIGNIFICANT CHANGE UP (ref 1–3.3)
LYMPHOCYTES # BLD AUTO: 4 % — LOW (ref 13–44)
MCHC RBC-ENTMCNC: 30.2 PG — SIGNIFICANT CHANGE UP (ref 27–34)
MCHC RBC-ENTMCNC: 32.9 GM/DL — SIGNIFICANT CHANGE UP (ref 32–36)
MCV RBC AUTO: 91.5 FL — SIGNIFICANT CHANGE UP (ref 80–100)
METHOD TYPE: SIGNIFICANT CHANGE UP
MONOCYTES # BLD AUTO: 1.32 K/UL — HIGH (ref 0–0.9)
MONOCYTES NFR BLD AUTO: 5 % — SIGNIFICANT CHANGE UP (ref 2–14)
NEUTROPHILS # BLD AUTO: 23.97 K/UL — HIGH (ref 1.8–7.4)
NEUTROPHILS NFR BLD AUTO: 83 % — HIGH (ref 43–77)
NITRITE UR-MCNC: NEGATIVE — SIGNIFICANT CHANGE UP
NRBC # BLD: SIGNIFICANT CHANGE UP /100 WBCS (ref 0–0)
OB PNL STL: POSITIVE
PH UR: 5 — SIGNIFICANT CHANGE UP (ref 5–8)
PLATELET # BLD AUTO: 183 K/UL — SIGNIFICANT CHANGE UP (ref 150–400)
POTASSIUM SERPL-MCNC: 5.1 MMOL/L — SIGNIFICANT CHANGE UP (ref 3.5–5.3)
POTASSIUM SERPL-SCNC: 5.1 MMOL/L — SIGNIFICANT CHANGE UP (ref 3.5–5.3)
PROT SERPL-MCNC: 7.8 G/DL — SIGNIFICANT CHANGE UP (ref 6–8.3)
PROT UR-MCNC: 100 MG/DL
RBC # BLD: 3.78 M/UL — LOW (ref 3.8–5.2)
RBC # FLD: 14 % — SIGNIFICANT CHANGE UP (ref 10.3–14.5)
SODIUM SERPL-SCNC: 127 MMOL/L — LOW (ref 135–145)
SP GR SPEC: 1.02 — SIGNIFICANT CHANGE UP (ref 1–1.03)
SPECIMEN SOURCE: SIGNIFICANT CHANGE UP
SPECIMEN SOURCE: SIGNIFICANT CHANGE UP
TROPONIN I, HIGH SENSITIVITY RESULT: 171.4 NG/L — HIGH
TSH SERPL-MCNC: 3.14 UIU/ML — SIGNIFICANT CHANGE UP (ref 0.36–3.74)
UROBILINOGEN FLD QL: 1 MG/DL — SIGNIFICANT CHANGE UP (ref 0.2–1)
WBC # BLD: 26.34 K/UL — HIGH (ref 3.8–10.5)
WBC # FLD AUTO: 26.34 K/UL — HIGH (ref 3.8–10.5)

## 2023-06-04 PROCEDURE — 93010 ELECTROCARDIOGRAM REPORT: CPT | Mod: 76

## 2023-06-04 PROCEDURE — 99291 CRITICAL CARE FIRST HOUR: CPT

## 2023-06-04 PROCEDURE — 71045 X-RAY EXAM CHEST 1 VIEW: CPT | Mod: 26

## 2023-06-04 PROCEDURE — 74176 CT ABD & PELVIS W/O CONTRAST: CPT | Mod: 26,MA

## 2023-06-04 RX ORDER — PROCHLORPERAZINE MALEATE 5 MG
5 TABLET ORAL ONCE
Refills: 0 | Status: COMPLETED | OUTPATIENT
Start: 2023-06-04 | End: 2023-06-04

## 2023-06-04 RX ORDER — SODIUM CHLORIDE 9 MG/ML
1000 INJECTION INTRAMUSCULAR; INTRAVENOUS; SUBCUTANEOUS ONCE
Refills: 0 | Status: COMPLETED | OUTPATIENT
Start: 2023-06-04 | End: 2023-06-04

## 2023-06-04 RX ORDER — INSULIN GLARGINE 100 [IU]/ML
15 INJECTION, SOLUTION SUBCUTANEOUS ONCE
Refills: 0 | Status: COMPLETED | OUTPATIENT
Start: 2023-06-04 | End: 2023-06-04

## 2023-06-04 RX ORDER — LEVOTHYROXINE SODIUM 125 MCG
1 TABLET ORAL
Qty: 0 | Refills: 0 | DISCHARGE

## 2023-06-04 RX ORDER — METOPROLOL TARTRATE 50 MG
1 TABLET ORAL
Qty: 0 | Refills: 0 | DISCHARGE

## 2023-06-04 RX ORDER — MEROPENEM 1 G/30ML
1000 INJECTION INTRAVENOUS EVERY 12 HOURS
Refills: 0 | Status: DISCONTINUED | OUTPATIENT
Start: 2023-06-04 | End: 2023-06-04

## 2023-06-04 RX ORDER — PANTOPRAZOLE SODIUM 20 MG/1
40 TABLET, DELAYED RELEASE ORAL ONCE
Refills: 0 | Status: COMPLETED | OUTPATIENT
Start: 2023-06-04 | End: 2023-06-04

## 2023-06-04 RX ORDER — NOREPINEPHRINE BITARTRATE/D5W 8 MG/250ML
0.05 PLASTIC BAG, INJECTION (ML) INTRAVENOUS
Qty: 8 | Refills: 0 | Status: DISCONTINUED | OUTPATIENT
Start: 2023-06-04 | End: 2023-06-05

## 2023-06-04 RX ORDER — ONDANSETRON 8 MG/1
4 TABLET, FILM COATED ORAL ONCE
Refills: 0 | Status: COMPLETED | OUTPATIENT
Start: 2023-06-04 | End: 2023-06-04

## 2023-06-04 RX ORDER — GLUCAGON INJECTION, SOLUTION 0.5 MG/.1ML
1 INJECTION, SOLUTION SUBCUTANEOUS ONCE
Refills: 0 | Status: DISCONTINUED | OUTPATIENT
Start: 2023-06-04 | End: 2023-06-04

## 2023-06-04 RX ORDER — DEXTROSE 50 % IN WATER 50 %
50 SYRINGE (ML) INTRAVENOUS
Refills: 0 | Status: DISCONTINUED | OUTPATIENT
Start: 2023-06-04 | End: 2023-06-19

## 2023-06-04 RX ORDER — IOHEXOL 300 MG/ML
30 INJECTION, SOLUTION INTRAVENOUS ONCE
Refills: 0 | Status: COMPLETED | OUTPATIENT
Start: 2023-06-04 | End: 2023-06-04

## 2023-06-04 RX ORDER — AZTREONAM 2 G
1000 VIAL (EA) INJECTION ONCE
Refills: 0 | Status: COMPLETED | OUTPATIENT
Start: 2023-06-04 | End: 2023-06-04

## 2023-06-04 RX ORDER — SODIUM CHLORIDE 9 MG/ML
1000 INJECTION, SOLUTION INTRAVENOUS
Refills: 0 | Status: DISCONTINUED | OUTPATIENT
Start: 2023-06-04 | End: 2023-06-04

## 2023-06-04 RX ORDER — INSULIN HUMAN 100 [IU]/ML
8 INJECTION, SOLUTION SUBCUTANEOUS
Qty: 100 | Refills: 0 | Status: DISCONTINUED | OUTPATIENT
Start: 2023-06-04 | End: 2023-06-05

## 2023-06-04 RX ORDER — ATORVASTATIN CALCIUM 80 MG/1
80 TABLET, FILM COATED ORAL AT BEDTIME
Refills: 0 | Status: DISCONTINUED | OUTPATIENT
Start: 2023-06-04 | End: 2023-06-19

## 2023-06-04 RX ORDER — DEXTROSE 50 % IN WATER 50 %
25 SYRINGE (ML) INTRAVENOUS ONCE
Refills: 0 | Status: DISCONTINUED | OUTPATIENT
Start: 2023-06-04 | End: 2023-06-04

## 2023-06-04 RX ORDER — MEROPENEM 1 G/30ML
INJECTION INTRAVENOUS
Refills: 0 | Status: DISCONTINUED | OUTPATIENT
Start: 2023-06-04 | End: 2023-06-04

## 2023-06-04 RX ORDER — PROCHLORPERAZINE MALEATE 5 MG
5 TABLET ORAL ONCE
Refills: 0 | Status: DISCONTINUED | OUTPATIENT
Start: 2023-06-04 | End: 2023-06-04

## 2023-06-04 RX ORDER — HEPARIN SODIUM 5000 [USP'U]/ML
7500 INJECTION INTRAVENOUS; SUBCUTANEOUS EVERY 8 HOURS
Refills: 0 | Status: DISCONTINUED | OUTPATIENT
Start: 2023-06-04 | End: 2023-06-19

## 2023-06-04 RX ORDER — ASPIRIN/CALCIUM CARB/MAGNESIUM 324 MG
1 TABLET ORAL
Qty: 0 | Refills: 0 | DISCHARGE

## 2023-06-04 RX ORDER — INSULIN GLARGINE 100 [IU]/ML
35 INJECTION, SOLUTION SUBCUTANEOUS
Qty: 0 | Refills: 0 | DISCHARGE

## 2023-06-04 RX ORDER — DEXTROSE 50 % IN WATER 50 %
12.5 SYRINGE (ML) INTRAVENOUS ONCE
Refills: 0 | Status: DISCONTINUED | OUTPATIENT
Start: 2023-06-04 | End: 2023-06-04

## 2023-06-04 RX ORDER — INSULIN GLARGINE 100 [IU]/ML
20 INJECTION, SOLUTION SUBCUTANEOUS AT BEDTIME
Refills: 0 | Status: DISCONTINUED | OUTPATIENT
Start: 2023-06-04 | End: 2023-06-04

## 2023-06-04 RX ORDER — LEVOTHYROXINE SODIUM 125 MCG
75 TABLET ORAL DAILY
Refills: 0 | Status: DISCONTINUED | OUTPATIENT
Start: 2023-06-04 | End: 2023-06-19

## 2023-06-04 RX ORDER — DEXTROSE 50 % IN WATER 50 %
15 SYRINGE (ML) INTRAVENOUS ONCE
Refills: 0 | Status: DISCONTINUED | OUTPATIENT
Start: 2023-06-04 | End: 2023-06-04

## 2023-06-04 RX ORDER — ASPIRIN/CALCIUM CARB/MAGNESIUM 324 MG
81 TABLET ORAL DAILY
Refills: 0 | Status: DISCONTINUED | OUTPATIENT
Start: 2023-06-04 | End: 2023-06-16

## 2023-06-04 RX ORDER — MEROPENEM 1 G/30ML
1000 INJECTION INTRAVENOUS ONCE
Refills: 0 | Status: COMPLETED | OUTPATIENT
Start: 2023-06-04 | End: 2023-06-04

## 2023-06-04 RX ORDER — CEFTRIAXONE 500 MG/1
2000 INJECTION, POWDER, FOR SOLUTION INTRAMUSCULAR; INTRAVENOUS EVERY 24 HOURS
Refills: 0 | Status: COMPLETED | OUTPATIENT
Start: 2023-06-04 | End: 2023-06-13

## 2023-06-04 RX ORDER — DEXTROSE 50 % IN WATER 50 %
25 SYRINGE (ML) INTRAVENOUS
Refills: 0 | Status: DISCONTINUED | OUTPATIENT
Start: 2023-06-04 | End: 2023-06-19

## 2023-06-04 RX ORDER — INSULIN LISPRO 100/ML
VIAL (ML) SUBCUTANEOUS EVERY 4 HOURS
Refills: 0 | Status: DISCONTINUED | OUTPATIENT
Start: 2023-06-04 | End: 2023-06-04

## 2023-06-04 RX ORDER — FAMOTIDINE 10 MG/ML
20 INJECTION INTRAVENOUS ONCE
Refills: 0 | Status: COMPLETED | OUTPATIENT
Start: 2023-06-04 | End: 2023-06-04

## 2023-06-04 RX ORDER — INSULIN ASPART 100 [IU]/ML
0 INJECTION, SOLUTION SUBCUTANEOUS
Qty: 0 | Refills: 0 | DISCHARGE

## 2023-06-04 RX ADMIN — PANTOPRAZOLE SODIUM 40 MILLIGRAM(S): 20 TABLET, DELAYED RELEASE ORAL at 01:59

## 2023-06-04 RX ADMIN — HEPARIN SODIUM 7500 UNIT(S): 5000 INJECTION INTRAVENOUS; SUBCUTANEOUS at 21:55

## 2023-06-04 RX ADMIN — MEROPENEM 100 MILLIGRAM(S): 1 INJECTION INTRAVENOUS at 07:39

## 2023-06-04 RX ADMIN — HEPARIN SODIUM 7500 UNIT(S): 5000 INJECTION INTRAVENOUS; SUBCUTANEOUS at 13:28

## 2023-06-04 RX ADMIN — SODIUM CHLORIDE 1000 MILLILITER(S): 9 INJECTION INTRAMUSCULAR; INTRAVENOUS; SUBCUTANEOUS at 05:13

## 2023-06-04 RX ADMIN — Medication 12: at 13:28

## 2023-06-04 RX ADMIN — SODIUM CHLORIDE 1000 MILLILITER(S): 9 INJECTION INTRAMUSCULAR; INTRAVENOUS; SUBCUTANEOUS at 05:38

## 2023-06-04 RX ADMIN — Medication 1000 MILLIGRAM(S): at 05:36

## 2023-06-04 RX ADMIN — FAMOTIDINE 20 MILLIGRAM(S): 10 INJECTION INTRAVENOUS at 01:59

## 2023-06-04 RX ADMIN — INSULIN HUMAN 8 UNIT(S)/HR: 100 INJECTION, SOLUTION SUBCUTANEOUS at 14:58

## 2023-06-04 RX ADMIN — IOHEXOL 30 MILLILITER(S): 300 INJECTION, SOLUTION INTRAVENOUS at 02:00

## 2023-06-04 RX ADMIN — Medication 12: at 10:37

## 2023-06-04 RX ADMIN — Medication 29 MICROGRAM(S)/KG/MIN: at 07:56

## 2023-06-04 RX ADMIN — INSULIN GLARGINE 15 UNIT(S): 100 INJECTION, SOLUTION SUBCUTANEOUS at 07:59

## 2023-06-04 RX ADMIN — Medication 50 MILLIGRAM(S): at 05:30

## 2023-06-04 RX ADMIN — Medication 81 MILLIGRAM(S): at 12:41

## 2023-06-04 RX ADMIN — CEFTRIAXONE 100 MILLIGRAM(S): 500 INJECTION, POWDER, FOR SOLUTION INTRAMUSCULAR; INTRAVENOUS at 12:42

## 2023-06-04 RX ADMIN — Medication 75 MICROGRAM(S): at 12:42

## 2023-06-04 RX ADMIN — SODIUM CHLORIDE 1000 MILLILITER(S): 9 INJECTION INTRAMUSCULAR; INTRAVENOUS; SUBCUTANEOUS at 01:59

## 2023-06-04 RX ADMIN — Medication 102 MILLIGRAM(S): at 15:29

## 2023-06-04 RX ADMIN — ONDANSETRON 4 MILLIGRAM(S): 8 TABLET, FILM COATED ORAL at 02:00

## 2023-06-04 RX ADMIN — SODIUM CHLORIDE 1000 MILLILITER(S): 9 INJECTION INTRAMUSCULAR; INTRAVENOUS; SUBCUTANEOUS at 10:59

## 2023-06-04 RX ADMIN — ONDANSETRON 4 MILLIGRAM(S): 8 TABLET, FILM COATED ORAL at 09:36

## 2023-06-04 RX ADMIN — ATORVASTATIN CALCIUM 80 MILLIGRAM(S): 80 TABLET, FILM COATED ORAL at 21:55

## 2023-06-04 NOTE — ED PROVIDER NOTE - OBJECTIVE STATEMENT
72-year-old female with history of insulin-dependent diabetes, CHF, hypertension who is presenting with nausea and vomiting for the last few days states no significant abdominal pain and history of multiple dark emesis and 1 episode of diarrhea today.  Denies fever or chills.  Denies URI symptoms.  States she was taking her insulin except today.

## 2023-06-04 NOTE — H&P ADULT - HISTORY OF PRESENT ILLNESS
72-year-old female with history of insulin-dependent diabetes, CHF, hypertension who is presenting with nausea and vomiting for the last few days states no significant abdominal pain and history of multiple dark emesis and 1 episode of diarrhea today.  Denies fever or chills.  Denies URI symptoms.  States she was taking her insulin except today.  In ER patient was found to be in septic shock.  patient is being admitted for further work up and treatment.

## 2023-06-04 NOTE — ED ADULT NURSE NOTE - OBJECTIVE STATEMENT
received pt   stable alert oriented x4 no distress c/o abd pain pt evaluated and blood work drawned and sent to lab ekg and xray done awaitintg received pt   stable alert oriented x4 no distress c/o abd pain pt evaluated and blood work drawned and sent to lab ekg and xray done awaitintg pt with bilateral lower extremities  and rt plantar with small wound noted

## 2023-06-04 NOTE — ED PROVIDER NOTE - CLINICAL SUMMARY MEDICAL DECISION MAKING FREE TEXT BOX
72-year-old female with history of cervical and evaluate diabetes, hypertension, CHF presenting for nausea and vomiting multiple episodes for the last few days and decreased appetite.  Patient states she took her insulin the last few days but not today due to feeling very ill.  Denies fever.  Denies abdominal pain.  Denies cough URI symptoms.  Vitals noted.  Patient is actively retching and vomiting.  Abdomen is soft nontender.  Patient with a small right ulcer to the bottom of her foot which does not appear to be infected.  Lungs clear to auscultation.  Will evaluate for peptic ulcer disease, DKA, sepsis, UTI, bowel obstruction.  Will get labs, chest x-ray, CT abdomen and pelvis.  Will give IV fluids, IV Protonix, IV Zofran, IV antibiotics.  Will reassess.

## 2023-06-04 NOTE — H&P ADULT - NSHPPHYSICALEXAM_GEN_ALL_CORE
· CONSTITUTIONAL: - - -  · Appearance: well appearing  · Distress: MODERATE  · Manner: appropriate for situation  · Mentation: awake, alert  · CARDIAC: Normal rate, regular rhythm.  Heart sounds S1, S2.  No murmurs, rubs or gallops.  · RESPIRATORY: Breath sounds clear and equal bilaterally.  · GASTROINTESTINAL: Abdomen soft, non-tender, no guarding.  · MUSCULOSKELETAL: Spine appears normal, range of motion is not limited, no muscle or joint tenderness  · NEUROLOGICAL: Alert and oriented, no focal deficits, no motor or sensory deficits.  · SKIN: WOUNDS  · SKIN COLOR: small wound to plantar aspect of foot, no discharge

## 2023-06-04 NOTE — PHARMACOTHERAPY INTERVENTION NOTE - COMMENTS
Med rec completed (confirmed w/ pharmacy and patient). Discussed w/ Dr. Wilburn and recommended re-starting levothyroxine 75mcg QD, ASA 81mg QD and atorvastatin 80mg QD. MD agreed and orders entered.

## 2023-06-04 NOTE — ED ADULT NURSE REASSESSMENT NOTE - NSFALLRISKINTERV_ED_ALL_ED
Assistance OOB with selected safe patient handling equipment if applicable/Assistance with ambulation/Communicate fall risk and risk factors to all staff, patient, and family/Provide visual cue: yellow wristband, yellow gown, etc/Reinforce activity limits and safety measures with patient and family/Toileting schedule using arm’s reach rule for commode and bathroom/Call bell, personal items and telephone in reach/Instruct patient to call for assistance before getting out of bed/chair/stretcher/Non-slip footwear applied when patient is off stretcher/[place] to call system/Physically safe environment - no spills, clutter or unnecessary equipment/Purposeful Proactive Rounding/Room/bathroom lighting operational, light cord in reach

## 2023-06-04 NOTE — ED PROVIDER NOTE - PROGRESS NOTE DETAILS
Pt's symptoms improved  Labs noted. DUNCAN, leukocytosis. elevated LA. AG normal.   Abx ordered for possible UTI  CXR neg for pna  Will rpt vitals and assess for ICU evaluation.  UA pending, not sent yet Rpt vitals noted, BP 95/60, HR improved to 70s.  Pt overall feels better  Case d/w ICU PA for severe sepsis and risk of decompensation  Advised to consult urology. Dr. Aleyda ambriz. Case d/w Dr. Maynard, no emergent urological intervention at this time. Dr. Maynard will see pt later this morning. Case d/w Dr. Mcdermott

## 2023-06-04 NOTE — CONSULT NOTE ADULT - SUBJECTIVE AND OBJECTIVE BOX
CHIEF COMPLAINT:    HISTORY OF PRESENT ILLNESS:    The patient is a 72-year-old female with history of insulin-dependent diabetes, CHF, hypertension who presented to the ED with nausea and vomiting for several days.  She has experienced no significant abdominal pain and history of multiple dark emesis and 1 episode of diarrhea today.  She denies fever or chills.  Denies URI symptoms.  She denies significant change in her urinary patters. CT performed on admission demonstrated mild left hydronephrosis without ureteral dilatation or obstructing calculus. Her admission labs are notable for elevated wbc and lactate with BUN and creatinine elevated significantly above baseline. Her vital signs were notable for tachycardia and hypotension upon presentation that has improved with IV hydration. She was on Bactrim prior to admission. Her urine culture during hospital admission in April 2023 demonstrated E. coli.    PAST MEDICAL & SURGICAL HISTORY:  Hypertension      Diabetes      Lymphedema      H/O left bundle branch block      History of left bundle branch block (LBBB)      Systolic heart failure, chronic      H/O cataract  2020      History of surgical removal of meniscus of knee  left in 1971      Frozen shoulder  2000      H/O Achilles tendon repair  lengthened bilaterally, 2000      Fractured skull  1968          REVIEW OF SYSTEMS:    CONSTITUTIONAL: As above  EYES/ENT: No visual changes;  No vertigo or throat pain   NECK: No pain or stiffness  RESPIRATORY: No cough, wheezing, hemoptysis; No shortness of breath  CARDIOVASCULAR: No chest pain or palpitations  GASTROINTESTINAL: As above  GENITOURINARY: As above  NEUROLOGICAL: No numbness or weakness  SKIN: No itching, burning, rashes, or lesions   All other review of systems is negative unless indicated above.    MEDICATIONS  (Home):    Bactrim  mg-160 mg oral tablet: 1 tab(s) orally 2 times a day  potassium chloride 10 mEq oral capsule, extended release: 2 cap(s) orally once a day   metoprolol tartrate 75 mg oral tablet: 1 tab(s) orally once a day  aspirin 81 mg oral tablet, chewable: 1 tab(s) orally once a day  levothyroxine 25 mcg (0.025 mg) oral tablet: 1 tab(s) orally once a day  Therapeutic Multiple Vitamins oral tablet: 1 tab(s) orally once a day  atorvastatin 80 mg oral tablet: 1 tab(s) orally once a day  Lantus: 35 unit(s) subcutaneous once a day (at bedtime)  NovoLOG: patient's own sliding scale  eplerenone 25 mg oral tablet: 1 tab(s) orally once a day  Entresto 24 mg-26 mg oral tablet: 1 tab(s) orally 2 times        Allergies    penicillins (Hives)    Intolerances        SOCIAL HISTORY:    FAMILY HISTORY:  Family history of CVA  mom, age 57        Vital Signs Last 24 Hrs  T(C): 36.7 (04 Jun 2023 05:11), Max: 36.7 (03 Jun 2023 23:33)  T(F): 98 (04 Jun 2023 05:11), Max: 98.1 (03 Jun 2023 23:33)  HR: 74 (04 Jun 2023 06:19) (74 - 136)  BP: 100/49 (04 Jun 2023 06:19) (89/52 - 100/49)  BP(mean): --  RR: 18 (04 Jun 2023 06:19) (16 - 20)  SpO2: 97% (04 Jun 2023 06:19) (97% - 100%)    Parameters below as of 04 Jun 2023 06:19  Patient On (Oxygen Delivery Method): nasal cannula  O2 Flow (L/min): 2      PHYSICAL EXAM:    Constitutional: NAD, well-developed  Back: Normal spine flexure, No CVA tenderness  Abd: Soft, NT/ND, No CVAT  Extremities: No peripheral edema  Neurological: A/O x 3  Psychiatric: Normal mood, normal affect  Skin: No rashes    LABS:                        11.4   26.34 )-----------( 183      ( 04 Jun 2023 01:52 )             34.6     06-04    127<L>  |  89<L>  |  91<H>  ----------------------------<  333<H>  5.1   |  25  |  4.30<H>    Ca    9.0      04 Jun 2023 01:52    TPro  7.8  /  Alb  2.9<L>  /  TBili  0.9  /  DBili  x   /  AST  38<H>  /  ALT  22  /  AlkPhos  89  06-04        Urine Culture:     RADIOLOGY & ADDITIONAL STUDIES:    < from: CT Abdomen and Pelvis w/ Oral Cont (06.04.23 @ 03:33) >    ACC: [id number] EXAM:  CT ABDOMEN AND PELVIS OC   ORDERED BY:  PEDRO LUIS MARTIN     PROCEDURE DATE:  06/04/2023          INTERPRETATION:  CLINICAL INFORMATION: Vomiting and abdominal pain.    COMPARISON: 4/20/2023 CT abdomen pelvis    CONTRAST/COMPLICATIONS:  IV Contrast: NONE  Oral Contrast: Omnipaque 300  Complications: None reported at time of study completion    PROCEDURE:  CT of the Abdomen and Pelvis was performed.  Sagittal and coronal reformats were performed.    FINDINGS:  LOWER CHEST: Within normal limits.    LIVER: Within normal limits.  BILE DUCTS: Normal caliber.  GALLBLADDER: Within normal limits.  SPLEEN: Within normal limits.  PANCREAS: Within normal limits.  ADRENALS: Within normal limits.  KIDNEYS/URETERS: Mild left hydronephrosis but with no stone or other   obstructing lesion identified. Suggestion of left renal pelvis and   ureteral wall thickening not well evaluated without IV contrast. Kidneys   otherwise similar to prior with mild bilateral perinephric stranding. No   concerning renal mass.    BLADDER: Nondilated. No stones or filling defects.  REPRODUCTIVE ORGANS: Small calcification in the uterus. No concerning   findings.    BOWEL: No bowel obstruction. Appendix is normal.  PERITONEUM: No ascites.  VESSELS:No abdominal aortic aneurysm. Atherosclerosis similar to prior.  RETROPERITONEUM/LYMPH NODES: No lymphadenopathy.  ABDOMINAL WALL: Within normal limits.  BONES: No acute findings. Bilateral L5 spondylolysis with mild   degenerative anterolisthesis ofL5 on S1. Prominent degenerative changes   lower lumbar spine.    IMPRESSION:  Mild left hydronephrosis is new. Left ureter not dilated. Suggestion of   left renal pelvis wall thickening not well evaluated without IV contrast.   Urinary tract infection could appear this way though not definitive.   Early or mild proximal left ureteral obstruction also possible though no   stone or other obstructing lesion is evident.    No other significant change. No bowel obstruction.        --- End of Report ---             WILLIAM ALEGRE MD; Attending Radiologist  This document has been electronically signed. Jun 4 2023  4:06AM    < end of copied text >   CHIEF COMPLAINT:    HISTORY OF PRESENT ILLNESS:    The patient is a 72-year-old female with history of insulin-dependent diabetes, CHF, hypertension who presented to the ED with nausea and vomiting for several days.  She has experienced no significant abdominal pain and history of multiple dark emesis and 1 episode of diarrhea today.  She denies fever or chills.  Denies URI symptoms.  She denies significant change in her urinary patters. CT performed on admission demonstrated mild left hydronephrosis without ureteral dilatation or obstructing calculus. Her admission labs are notable for elevated wbc and lactate with BUN and creatinine elevated significantly above baseline. Her vital signs were notable for tachycardia and hypotension upon presentation that has improved with IV hydration. She was on Bactrim prior to admission. Her urine culture during hospital admission in April 2023 demonstrated E. coli. She has been unable to provide a urine sample. She has received Aztronam and is being maintained on meropenem.     PAST MEDICAL & SURGICAL HISTORY:  Hypertension      Diabetes      Lymphedema      H/O left bundle branch block      History of left bundle branch block (LBBB)      Systolic heart failure, chronic      H/O cataract  2020      History of surgical removal of meniscus of knee  left in 1971      Frozen shoulder  2000      H/O Achilles tendon repair  lengthened bilaterally, 2000      Fractured skull  1968          REVIEW OF SYSTEMS:    CONSTITUTIONAL: As above  EYES/ENT: No visual changes;  No vertigo or throat pain   NECK: No pain or stiffness  RESPIRATORY: No cough, wheezing, hemoptysis; No shortness of breath  CARDIOVASCULAR: No chest pain or palpitations  GASTROINTESTINAL: As above  GENITOURINARY: As above  NEUROLOGICAL: No numbness or weakness  SKIN: No itching, burning, rashes, or lesions   All other review of systems is negative unless indicated above.    MEDICATIONS  (Home):    Bactrim  mg-160 mg oral tablet: 1 tab(s) orally 2 times a day  potassium chloride 10 mEq oral capsule, extended release: 2 cap(s) orally once a day   metoprolol tartrate 75 mg oral tablet: 1 tab(s) orally once a day  aspirin 81 mg oral tablet, chewable: 1 tab(s) orally once a day  levothyroxine 25 mcg (0.025 mg) oral tablet: 1 tab(s) orally once a day  Therapeutic Multiple Vitamins oral tablet: 1 tab(s) orally once a day  atorvastatin 80 mg oral tablet: 1 tab(s) orally once a day  Lantus: 35 unit(s) subcutaneous once a day (at bedtime)  NovoLOG: patient's own sliding scale  eplerenone 25 mg oral tablet: 1 tab(s) orally once a day  Entresto 24 mg-26 mg oral tablet: 1 tab(s) orally 2 times        Allergies    penicillins (Hives)    Intolerances        SOCIAL HISTORY:    FAMILY HISTORY:  Family history of CVA  mom, age 57        Vital Signs Last 24 Hrs  T(C): 36.7 (04 Jun 2023 05:11), Max: 36.7 (03 Jun 2023 23:33)  T(F): 98 (04 Jun 2023 05:11), Max: 98.1 (03 Jun 2023 23:33)  HR: 74 (04 Jun 2023 06:19) (74 - 136)  BP: 100/49 (04 Jun 2023 06:19) (89/52 - 100/49)  BP(mean): --  RR: 18 (04 Jun 2023 06:19) (16 - 20)  SpO2: 97% (04 Jun 2023 06:19) (97% - 100%)    Parameters below as of 04 Jun 2023 06:19  Patient On (Oxygen Delivery Method): nasal cannula  O2 Flow (L/min): 2      PHYSICAL EXAM:    Constitutional: NAD, well-developed  Back: Normal spine flexure, No CVA tenderness  Abd: Soft, NT/ND, No CVAT  Extremities: No peripheral edema  Neurological: A/O x 3  Psychiatric: Normal mood, normal affect  Skin: No rashes    LABS:                        11.4   26.34 )-----------( 183      ( 04 Jun 2023 01:52 )             34.6     06-04    127<L>  |  89<L>  |  91<H>  ----------------------------<  333<H>  5.1   |  25  |  4.30<H>    Ca    9.0      04 Jun 2023 01:52    TPro  7.8  /  Alb  2.9<L>  /  TBili  0.9  /  DBili  x   /  AST  38<H>  /  ALT  22  /  AlkPhos  89  06-04        Urine Culture:     RADIOLOGY & ADDITIONAL STUDIES:    < from: CT Abdomen and Pelvis w/ Oral Cont (06.04.23 @ 03:33) >    ACC: [id number] EXAM:  CT ABDOMEN AND PELVIS OC   ORDERED BY:  PEDRO LUIS MARTIN     PROCEDURE DATE:  06/04/2023          INTERPRETATION:  CLINICAL INFORMATION: Vomiting and abdominal pain.    COMPARISON: 4/20/2023 CT abdomen pelvis    CONTRAST/COMPLICATIONS:  IV Contrast: NONE  Oral Contrast: Omnipaque 300  Complications: None reported at time of study completion    PROCEDURE:  CT of the Abdomen and Pelvis was performed.  Sagittal and coronal reformats were performed.    FINDINGS:  LOWER CHEST: Within normal limits.    LIVER: Within normal limits.  BILE DUCTS: Normal caliber.  GALLBLADDER: Within normal limits.  SPLEEN: Within normal limits.  PANCREAS: Within normal limits.  ADRENALS: Within normal limits.  KIDNEYS/URETERS: Mild left hydronephrosis but with no stone or other   obstructing lesion identified. Suggestion of left renal pelvis and   ureteral wall thickening not well evaluated without IV contrast. Kidneys   otherwise similar to prior with mild bilateral perinephric stranding. No   concerning renal mass.    BLADDER: Nondilated. No stones or filling defects.  REPRODUCTIVE ORGANS: Small calcification in the uterus. No concerning   findings.    BOWEL: No bowel obstruction. Appendix is normal.  PERITONEUM: No ascites.  VESSELS:No abdominal aortic aneurysm. Atherosclerosis similar to prior.  RETROPERITONEUM/LYMPH NODES: No lymphadenopathy.  ABDOMINAL WALL: Within normal limits.  BONES: No acute findings. Bilateral L5 spondylolysis with mild   degenerative anterolisthesis ofL5 on S1. Prominent degenerative changes   lower lumbar spine.    IMPRESSION:  Mild left hydronephrosis is new. Left ureter not dilated. Suggestion of   left renal pelvis wall thickening not well evaluated without IV contrast.   Urinary tract infection could appear this way though not definitive.   Early or mild proximal left ureteral obstruction also possible though no   stone or other obstructing lesion is evident.    No other significant change. No bowel obstruction.        --- End of Report ---             WILLIAM ALEGRE MD; Attending Radiologist  This document has been electronically signed. Jun 4 2023  4:06AM    < end of copied text >

## 2023-06-04 NOTE — PROGRESS NOTE ADULT - ASSESSMENT
71yo F with PMHx IDDM2, HFrEF, HTN, hypothyroidism, LBBB, chronic lymphedema presenting with decreased appetite and multiple episodes of emesis. Recent pansensitive E.coli UTI in April treated with Bactrim. Now with L hydronephrosis noted on CT. Admitted to ICU for septic shock 2/2 UTI.    Neuro: awake and alert, no active issues  Cardio: Septic shock responsive to IVF but MAP dropping requiring pressors, wean levo as tolerated, maintain MAP > 65. Hold home entresto, eplerenone and metoprolol in setting of sepsis. Restart HF meds as volume status requires and BP allows. QTc 501, Avoid QTc prolonging meds.  Pulm: saturating well on 2LNC, wean as tolerated  GI: DASH/TLC diet. Would avoid further Zofran for nausea in setting of long QT. Tigan PRN.  Renal: L hydronephrosis without obstructing stone noted on CT. Urology consulted, recommending medical management. Hyponatremia likely 2/2 dehydration, s/p 3LNS since admission, monitor. Nephro consulted.  ID: Septic shock 2/2 UTI. Monitor leukocytosis. Lactate clearing. IV abx transitioned from meropenem to Rocephin (penicillin allergy, has tolerated Rocephin in the past). F/u BCx, UCx.  Endo: Lantus 20u qhs, ISS. Plan to increase to home Lantus (37U qhs) tomorrow as sugars tolerate.  Heme: VTE ppx heparin subq  Dispo: Full code

## 2023-06-04 NOTE — ED ADULT NURSE NOTE - NSFALLUNIVINTERV_ED_ALL_ED
Bed/Stretcher in lowest position, wheels locked, appropriate side rails in place/Call bell, personal items and telephone in reach/Instruct patient to call for assistance before getting out of bed/chair/stretcher/Non-slip footwear applied when patient is off stretcher/[place] to call system/Physically safe environment - no spills, clutter or unnecessary equipment/Purposeful proactive rounding/Room/bathroom lighting operational, light cord in reach

## 2023-06-04 NOTE — ED ADULT NURSE REASSESSMENT NOTE - NS ED NURSE REASSESS COMMENT FT1
pt admitted to ICU for low B?P awaiting bed assignment
Pt awaiting ICU bed. Pt is c/o of nausea and is requesting antiemetic. Pt noted to have BP of 78/37 ICU team will be notified. Pt denies dizziness. Pt in no acute distress. Pt updated on plan of care.

## 2023-06-04 NOTE — PATIENT PROFILE ADULT - FALL HARM RISK - HARM RISK INTERVENTIONS
Assistance with ambulation/Assistance OOB with selected safe patient handling equipment/Communicate Risk of Fall with Harm to all staff/Discuss with provider need for PT consult/Monitor gait and stability/Provide patient with walking aids - walker, cane, crutches/Reinforce activity limits and safety measures with patient and family/Review medications for side effects contributing to fall risk/Sit up slowly, dangle for a short time, stand at bedside before walking/Tailored Fall Risk Interventions/Toileting schedule using arm’s reach rule for commode and bathroom/Visual Cue: Yellow wristband and red socks/Bed in lowest position, wheels locked, appropriate side rails in place/Call bell, personal items and telephone in reach/Instruct patient to call for assistance before getting out of bed or chair/Non-slip footwear when patient is out of bed/[place] to call system/Physically safe environment - no spills, clutter or unnecessary equipment/Purposeful Proactive Rounding/Room/bathroom lighting operational, light cord in reach

## 2023-06-04 NOTE — CONSULT NOTE ADULT - SUBJECTIVE AND OBJECTIVE BOX
Patient is a 72y old  Female who presents with a chief complaint of sepsis (04 Jun 2023 10:59)       HPI: female with history of insulin-dependent diabetes, CHF, hypertension who presented to the ED with nausea and vomiting for several days.  She has experienced no significant abdominal pain and history of multiple dark emesis and 1 episode of diarrhea today.  She denies fever or chills.  Denies URI symptoms.  She denies significant change in her urinary patters. CT performed on admission demonstrated mild left hydronephrosis without ureteral dilatation or obstructing calculus. Found to have DUNCAN and hypotension. Renal consulted for further eval. Has not seen a Nephrologist outpatient. Currently asymptomatic.        PAST MEDICAL & SURGICAL HISTORY:  Hypertension      Diabetes      Lymphedema      H/O left bundle branch block      History of left bundle branch block (LBBB)      Systolic heart failure, chronic      H/O cataract  2020      History of surgical removal of meniscus of knee  left in 1971      Frozen shoulder  2000      H/O Achilles tendon repair  lengthened bilaterally, 2000      Fractured skull  1968           FAMILY HISTORY:  Family history of CVA  mom, age 57    [place]    Social History:Non smoker    MEDICATIONS  (STANDING):  aspirin  chewable 81 milliGRAM(s) Oral daily  atorvastatin 80 milliGRAM(s) Oral at bedtime  cefTRIAXone   IVPB 2000 milliGRAM(s) IV Intermittent every 24 hours  dextrose 5%. 1000 milliLiter(s) (50 mL/Hr) IV Continuous <Continuous>  dextrose 5%. 1000 milliLiter(s) (100 mL/Hr) IV Continuous <Continuous>  dextrose 50% Injectable 25 Gram(s) IV Push once  dextrose 50% Injectable 25 Gram(s) IV Push once  dextrose 50% Injectable 12.5 Gram(s) IV Push once  glucagon  Injectable 1 milliGRAM(s) IntraMuscular once  heparin   Injectable 7500 Unit(s) SubCutaneous every 8 hours  insulin glargine Injectable (LANTUS) 20 Unit(s) SubCutaneous at bedtime  insulin lispro (ADMELOG) corrective regimen sliding scale   SubCutaneous every 4 hours  levothyroxine 75 MICROGram(s) Oral daily  norepinephrine Infusion 0.05 MICROgram(s)/kG/Min (29 mL/Hr) IV Continuous <Continuous>    MEDICATIONS  (PRN):  dextrose Oral Gel 15 Gram(s) Oral once PRN Blood Glucose LESS THAN 70 milliGRAM(s)/deciliter   Meds reviewed    Allergies    penicillins (Hives)    Intolerances         REVIEW OF SYSTEMS:    Review of Systems:   Constitutional: Denies fatigue  HEENT: Denies headaches and dizziness  Respiratory: denies SOB, cough, or wheezing  Cardiovascular: denies CP, palpitations  Gastrointestinal: Denies nausea, denies vomiting, diarrhea, constipation, abdominal pain, or bloody stools  Genitourinary: denies painful urination, increased frequency, urgency, or bloody urine  Skin: denies rashes or itching  Musculoskeletal: denies muscle aches, joint swelling  Neurologic: Denies generalized weakness, denies loss of sensation, numbness, or tingling      Vital Signs Last 24 Hrs  T(C): 36.9 (04 Jun 2023 09:15), Max: 36.9 (04 Jun 2023 09:15)  T(F): 98.4 (04 Jun 2023 09:15), Max: 98.4 (04 Jun 2023 09:15)  HR: 70 (04 Jun 2023 12:00) (60 - 136)  BP: 132/55 (04 Jun 2023 12:00) (1/- - 157/60)  BP(mean): 79 (04 Jun 2023 12:00) (74 - 91)  RR: 22 (04 Jun 2023 12:00) (16 - 30)  SpO2: 95% (04 Jun 2023 12:00) (86% - 100%)    Parameters below as of 04 Jun 2023 11:00  Patient On (Oxygen Delivery Method): nasal cannula  O2 Flow (L/min): 2    Daily Height in cm: 180.34 (04 Jun 2023 09:02)    Daily     PHYSICAL EXAM:    GENERAL: NAD  HEAD:  Atraumatic, Normocephalic  EYES: EOMI, conjunctiva and sclera clear  ENMT: No Drainage from nares, No drainage from ears  NECK: Supple, neck  veins full  NERVOUS SYSTEM:  Awake and Alert  CHEST/LUNG: Clear to percussion bilaterally; No rales, rhonchi, wheezing, or rubs  HEART: Regular rate and rhythm; No murmurs, rubs, or gallops  ABDOMEN: Soft, Nontender, Nondistended; Bowel sounds present  EXTREMITIES:  No Edema  SKIN: No rashes No obvious ecchymosis      LABS:                        11.4   26.34 )-----------( 183      ( 04 Jun 2023 01:52 )             34.6     06-04    127<L>  |  89<L>  |  91<H>  ----------------------------<  333<H>  5.1   |  25  |  4.30<H>    Ca    9.0      04 Jun 2023 01:52    TPro  7.8  /  Alb  2.9<L>  /  TBili  0.9  /  DBili  x   /  AST  38<H>  /  ALT  22  /  AlkPhos  89  06-04                RADIOLOGY & ADDITIONAL TESTS:

## 2023-06-04 NOTE — CONSULT NOTE ADULT - PROBLEM SELECTOR RECOMMENDATION 2
Mild. This finding in not likely clinically significant and does not warrant intervention at this time. I will continue to follow with you.

## 2023-06-04 NOTE — ED ADULT NURSE NOTE - SKIN TEMPERATURE MOISTURE
Pt has had a painful sore throat for two weeks. Pt is able to eat/drink/swallow but it is painful. Pt states that he has had fevers at home. Pt has had a non-productive cough. warm

## 2023-06-04 NOTE — H&P ADULT - NSHPREVIEWOFSYSTEMS_GEN_ALL_CORE
· CONSTITUTIONAL: no fever and no chills.  · CARDIOVASCULAR: no chest pain and no edema.  · RESPIRATORY: no chest pain, no cough, and no shortness of breath.  · GASTROINTESTINAL: - - -  · Gastrointestinal [+]: DIARRHEA, NAUSEA, VOMITING  · GENITOURINARY: no dysuria, no frequency, and no hematuria.

## 2023-06-04 NOTE — CONSULT NOTE ADULT - ASSESSMENT
Acute on CKD Stage 4  Sepsis/UTI  Hydronephrosis      -DUNCAN from sepsis/hypotension and renal hypoperfusion; unilateral mild hydro should not cause this degree of DUNCAN  -Check urine indices  -IVF  -Abx, renal dosing  -Urology eval noted  -Monitor chemistries and urine output    Thank you

## 2023-06-04 NOTE — CONSULT NOTE ADULT - SUBJECTIVE AND OBJECTIVE BOX
70 yo F patient of size  PMHx  hypothyroidism HTN, DM2, LBBB, chronic lymphedema, HFrEF (EF 35-40%) normal cath last summer.      Had a pan sensitive  e coli UTI in April and was rx'd with ABX >  Bactrim    P/w anorexia and multiple episode of emesis since Friday.    Denies fever rigors or dysuria abd pain.  just nausea.      W/U here   > Leukocytosis/lactic acidosis/DUNCAN with hyponatremia   CT suggestive  of a complicated UTI with mild L HN (no stones, but possible obstruction of the L  ureter) mild perinephric stranding.    Has not been able to provide urine here yet but looks like the urinary tract is the source of her septic shock.     She has a chronic wound on the plantar aspect of the R foot that looks clean and not infected.   Her abd is soft     Hypotensive responding to IVF, but keeps dropping her MAP       POCUS on arrival to ICU  Additional Crystalloid now   Peripheral nor-epi to defend the MAP   Hold all HF mets Entresto/Eplerenone/metoprolol while in septic shock.   Broaden ABX to meropenum  Was abx exposed at risk fro MDR.  Aware of PCN allergy   Follow cultures  Urology called Dr Maynard to comment on L HN   Trend lactate  Glycemic control   LA insulin with SS  Check HGB AIC  TSH   Enhanced DVT P     Full code.   71 yo F patient of size  PMHx  hypothyroidism HTN, DM2, LBBB, chronic lymphedema, HFrEF (EF 35-40%) normal cath last summer.      Had a pan sensitive  e coli UTI in April and was rx'd with ABX >  Bactrim    P/w anorexia and multiple episode of emesis since Friday.    Denies fever rigors or dysuria abd pain,  just has  nausea.      W/U here   > Leukocytosis/lactic acidosis/DUNCAN with hyponatremia   CT suggestive  of a complicated UTI with mild L HN (no stones, but possible obstruction of the L  ureter) mild perinephric stranding.    Has not been able to provide urine here yet but looks like the urinary tract is the source of her septic shock.     She has a chronic wound on the plantar aspect of the R foot that looks clean and not infected.   Her abd is soft     Hypotensive responding to IVF, but keeps dropping her MAP       POCUS on arrival to ICU  Additional Crystalloid now   Peripheral nor-epi to defend the MAP   Hold all HF mets Entresto/Eplerenone/metoprolol while in septic shock.   Broaden ABX to meropenum  Was abx exposed and is  at risk for MDRO's .  Aware of PCN allergy   Follow cultures  Urology called Dr Maynard to comment on L HN   Trend lactate  Glycemic control   LA insulin with SS  Check HGB AIC  TSH   Enhanced DVT P     Full code.   CCT 37 min

## 2023-06-04 NOTE — CONSULT NOTE ADULT - SUBJECTIVE AND OBJECTIVE BOX
HPI:  73yo F with PMHx IDDM2, HFrEF, HTN, hypothyroidism, LBBB, chronic lymphedema who presented to the hospital with cc of decreased appetite and multiple episodes of emesis. Ua with pyruria. WBC 26K with bandemia. Also with DUNCAN/CKD. L hydronephrosis noted on CT- no obstructive uropathy. Admitted to ICU for septic shock. Seen by urology. Had Ecoli UTI last month - took bactrim.    Infectious Disease consult was called to evaluate pt and for antibiotic management.      Past Medical & Surgical Hx:  PAST MEDICAL & SURGICAL HISTORY:  Hypertension  Diabetes  Lymphedema  H/O left bundle branch block  History of left bundle branch block (LBBB)  Systolic heart failure, chronic  H/O cataract    History of surgical removal of meniscus of knee  left in   Frozen shoulder    H/O Achilles tendon repair  lengthened bilaterally,   Fractured skull          Social History--  EtOH: denies   Tobacco: denies   Drug Use: denies     FAMILY HISTORY:  Family history of CVA  mom, age 57    Allergies  penicillins (Hives)    Intolerances  NONE      Home Medications:  aspirin 81 mg oral tablet: 1 tab(s) orally once a day (2023 10:33)  atorvastatin 80 mg oral tablet: 1 tab(s) orally once a day (2023 10:27)  Entresto 24 mg-26 mg oral tablet: 1 tab(s) orally 2 times a day (2023 10:27)  eplerenone 25 mg oral tablet: 1 tab(s) orally once a day (2023 10:27)  Lantus 100 units/mL subcutaneous solution: 37 unit(s) subcutaneous once a day (at bedtime) (2023 10:33)  levothyroxine 75 mcg (0.075 mg) oral tablet: 1 tab(s) orally once a day (2023 10:33)  NovoLOG 100 units/mL subcutaneous solution: subcutaneous Sliding scale MDD 40units (2023 10:33)  Toprol- mg oral tablet, extended release: 1 tab(s) orally once a day (2023 10:33)  torsemide 20 mg oral tablet: 1 tab(s) orally 2 times a day (2023 10:36)      Current Inpatient Medications :    ANTIBIOTICS:   cefTRIAXone   IVPB 2000 milliGRAM(s) IV Intermittent every 24 hours      OTHER RELEVANT MEDICATIONS :  aspirin  chewable 81 milliGRAM(s) Oral daily  atorvastatin 80 milliGRAM(s) Oral at bedtime  dextrose 50% Injectable 50 milliLiter(s) IV Push every 15 minutes  dextrose 50% Injectable 25 milliLiter(s) IV Push every 15 minutes  heparin   Injectable 7500 Unit(s) SubCutaneous every 8 hours  insulin regular Infusion 8 Unit(s)/Hr IV Continuous <Continuous>  levothyroxine 75 MICROGram(s) Oral daily  norepinephrine Infusion 0.05 MICROgram(s)/kG/Min IV Continuous <Continuous>      ROS:  CONSTITUTIONAL:  Negative fever or chills  EYES:  Negative  blurry vision or double vision  CARDIOVASCULAR:  Negative for chest pain or palpitations  RESPIRATORY:  Negative for cough, wheezing, or SOB   GASTROINTESTINAL:  Negative for diarrhea, constipation, or abdominal pain +nausea, vomiting,   GENITOURINARY:  Negative frequency, urgency , dysuria or hematuria   NEUROLOGIC:  No headache, confusion, dizziness, lightheadedness  All other systems were reviewed and are negative        I&O's Detail    2023 07:01  -  2023 18:24  --------------------------------------------------------  IN:    Insulin: 25.2 mL    IV PiggyBack: 50 mL    Norepinephrine: 101.3 mL    Oral Fluid: 120 mL    Sodium Chloride 0.9% Bolus: 1000 mL  Total IN: 1296.5 mL    OUT:    Voided (mL): 50 mL  Total OUT: 50 mL    Total NET: 1246.5 mL      Physical Exam:  Vital Signs Last 24 Hrs  T(C): 36.6 (2023 16:00), Max: 37.1 (2023 12:30)  T(F): 97.8 (2023 16:00), Max: 98.8 (2023 12:30)  HR: 67 (2023 18:00) (60 - 136)  BP: 127/59 (2023 18:00) (1/- - 157/60)  BP(mean): 85 (2023 18:00) (72 - 91)  RR: 23 (2023 18:00) (16 - 30)  SpO2: 100% (2023 18:00) (86% - 100%)    Parameters below as of 2023 18:00  Patient On (Oxygen Delivery Method): nasal cannula  O2 Flow (L/min): 2    Height (cm): 180.3 ( @ 09:02)  Weight (kg): 151 ( @ 09:02)  BMI (kg/m2): 46.4 ( @ 09:02)  BSA (m2): 2.62 ( @ 09:02)    General: no acute distress  Neck: supple, trachea midline  Lungs: clear, no wheeze/rhonchi  Cardiovascular: regular rate and rhythm, S1 S2  Abdomen: soft, nontender, ND, bowel sounds normal  Neurological:  alert and oriented x3  Skin: no rash  Extremities: + edema    Labs:                         11.4   26.34 )-----------( 183      ( 2023 01:52 )             34.6   -    127<L>  |  89<L>  |  91<H>  ----------------------------<  333<H>  5.1   |  25  |  4.30<H>    Ca    9.0      2023 01:52    TPro  7.8  /  Alb  2.9<L>  /  TBili  0.9  /  DBili  x   /  AST  38<H>  /  ALT  22  /  AlkPhos  89  06-04    Urinalysis Basic - ( 2023 12:00 )    Color: Dark Yellow / Appearance: Turbid / S.016 / pH: x  Gluc: x / Ketone: Trace mg/dL  / Bili: Negative / Urobili: 1.0 mg/dL   Blood: x / Protein: 100 mg/dL / Nitrite: Negative   Leuk Esterase: Large / RBC: 6 /HPF / WBC Too Numerous to count /HPF   Sq Epi: x / Non Sq Epi: x / Bacteria: Moderate /HPF      RECENT CULTURES:          RADIOLOGY & ADDITIONAL STUDIES:    ACC: [id number] EXAM:  CT ABDOMEN AND PELVIS OC   ORDERED BY:  PEDRO LUIS MARTIN     PROCEDURE DATE:  2023          INTERPRETATION:  CLINICAL INFORMATION: Vomiting and abdominal pain.    COMPARISON: 2023 CT abdomen pelvis    CONTRAST/COMPLICATIONS:  IV Contrast: NONE  Oral Contrast: Omnipaque 300  Complications: None reported at time of study completion    PROCEDURE:  CT of the Abdomen and Pelvis was performed.  Sagittal and coronal reformats were performed.    FINDINGS:  LOWER CHEST: Within normal limits.    LIVER: Within normal limits.  BILE DUCTS: Normal caliber.  GALLBLADDER: Within normal limits.  SPLEEN: Within normal limits.  PANCREAS: Within normal limits.  ADRENALS: Within normal limits.  KIDNEYS/URETERS: Mild left hydronephrosis but with no stone or other   obstructing lesion identified. Suggestion of left renal pelvis and   ureteral wall thickening not well evaluated without IV contrast. Kidneys   otherwise similar to prior with mild bilateral perinephric stranding. No   concerning renal mass.    BLADDER: Nondilated. No stones or filling defects.  REPRODUCTIVE ORGANS: Small calcification in the uterus. No concerning   findings.    BOWEL: No bowel obstruction. Appendix is normal.  PERITONEUM: No ascites.  VESSELS:No abdominal aortic aneurysm. Atherosclerosis similar to prior.  RETROPERITONEUM/LYMPH NODES: No lymphadenopathy.  ABDOMINAL WALL: Within normal limits.  BONES: No acute findings. Bilateral L5 spondylolysis with mild   degenerative anterolisthesis ofL5 on S1. Prominent degenerative changes   lower lumbar spine.    IMPRESSION:  Mild left hydronephrosis is new. Left ureter not dilated. Suggestion of   left renal pelvis wall thickening not well evaluated without IV contrast.   Urinary tract infection could appear this way though not definitive.   Early or mild proximal left ureteral obstruction also possible though no   stone or other obstructing lesion is evident.    No other significant change. No bowel obstruction.    Assessment :   73yo F with PMHx IDDM2, HFrEF, HTN, hypothyroidism, LBBB, chronic lymphedema admitted with septic shock sec Left pyelo with hydro- no obtructive uropathy on CT Ua with pyruria. WBC 26K with bandemia. Also with DUNCAN/CKD. Seen by urology. Had Ecoli UTI last month - took bactrim.  Prob bacteremic  Off pressors    Plan :   Cont Rocephin  Fu cultures  Trend temps and cbc  Urology on case    Continue with present regiment .  Approptiate use of antibiotics and adverse effects reviewed.      I have discussed the above plan of care with patient/family in detail. They expressed understanding of the treatment plan . Risks, benefits and alternatives discussed in detail. I have asked if they have any questions or concerns and appropriately addressed them to the best of my ability .      > 45 minutes spent in direct patient care reviewing  the notes, lab data/ imaging , discussion with multidisciplinary team. All questions were addressed and answered to the best of my capacity .    Thank you for allowing me to participate in the care of your patient .      Robby Clark MD  Infectious Disease  [phone]

## 2023-06-04 NOTE — PROGRESS NOTE ADULT - SUBJECTIVE AND OBJECTIVE BOX
Patient is a 72y old  Female who presents with a chief complaint of sepsis (04 Jun 2023 12:17)    Interval events: 71yo F with PMHx IDDM2, HFrEF, HTN, hypothyroidism, LBBB, chronic lymphedema presenting with septic shock 2/2 UTI. In April noted to have pansensitive E.coli UTI and treated with Bactrim. Now with decreased appetite and multiple episodes of emesis x2 days. Presenting with leukocytosis, lactic acidosis, DUNCAN and hyponatremia. CTAP with mild left hydronephrosis with no evidence of obstructing stone, urology consulted recommending medical management. Pt hypotensive in ED responsive to IVF but MAP < 65, started on levophed. Given IV azactam in ED (hx penicillin allergy), transitioned to meropenem and admitted to ICU.    Review of Systems:  Constitutional: no fever, chills, fatigue  Neuro: no headache, numbness, weakness  Resp: no cough, wheezing, shortness of breath  CVS: no chest pain, palpitations, leg swelling  GI: no abdominal pain, nausea, vomiting, diarrhea   : no dysuria, frequency, incontinence  Skin: no itching, burning, rashes, or lesions   Msk: no joint pain or swelling  Psych: no depression, anxiety    T(F): 98.4 (06-04-23 @ 09:15), Max: 98.4 (06-04-23 @ 09:15)  HR: 70 (06-04-23 @ 12:00) (60 - 136)  BP: 132/55 (06-04-23 @ 12:00) (1/- - 157/60)  RR: 22 (06-04-23 @ 12:00) (16 - 30)  SpO2: 95% (06-04-23 @ 12:00) (86% - 100%)  Wt(kg): --        CAPILLARY BLOOD GLUCOSE      POCT Blood Glucose.: 433 mg/dL (04 Jun 2023 10:30)    I&O's Summary    04 Jun 2023 07:01  -  04 Jun 2023 13:00  --------------------------------------------------------  IN: 1061.6 mL / OUT: 0 mL / NET: 1061.6 mL        Physical Exam:     Gen:  Neuro:  HEENT:  CV:  Pulm:  GI:  Ext:  Skin:    Meds:  MEDICATIONS  (STANDING):  aspirin  chewable 81 milliGRAM(s) Oral daily  atorvastatin 80 milliGRAM(s) Oral at bedtime  cefTRIAXone   IVPB 2000 milliGRAM(s) IV Intermittent every 24 hours  dextrose 5%. 1000 milliLiter(s) (100 mL/Hr) IV Continuous <Continuous>  dextrose 5%. 1000 milliLiter(s) (50 mL/Hr) IV Continuous <Continuous>  dextrose 50% Injectable 12.5 Gram(s) IV Push once  dextrose 50% Injectable 25 Gram(s) IV Push once  dextrose 50% Injectable 25 Gram(s) IV Push once  glucagon  Injectable 1 milliGRAM(s) IntraMuscular once  heparin   Injectable 7500 Unit(s) SubCutaneous every 8 hours  insulin glargine Injectable (LANTUS) 20 Unit(s) SubCutaneous at bedtime  insulin lispro (ADMELOG) corrective regimen sliding scale   SubCutaneous every 4 hours  levothyroxine 75 MICROGram(s) Oral daily  norepinephrine Infusion 0.05 MICROgram(s)/kG/Min (29 mL/Hr) IV Continuous <Continuous>    MEDICATIONS  (PRN):  dextrose Oral Gel 15 Gram(s) Oral once PRN Blood Glucose LESS THAN 70 milliGRAM(s)/deciliter                            11.4   26.34 )-----------( 183      ( 04 Jun 2023 01:52 )             34.6     Bands 8.0    06-04    127<L>  |  89<L>  |  91<H>  ----------------------------<  333<H>  5.1   |  25  |  4.30<H>    Ca    9.0      04 Jun 2023 01:52    TPro  7.8  /  Alb  2.9<L>  /  TBili  0.9  /  DBili  x   /  AST  38<H>  /  ALT  22  /  AlkPhos  89  06-04    Lactate 2.4           06-04 @ 08:38    Lactate 3.6           06-04 @ 01:52                        Radiology: ***  Bedside Lung U/S: ***  Bedside Cardiac U/S: ***    CENTRAL LINE: Y/N   DATE INSERTED:   REMOVE: Y/N  LEVON: Y/N      DATE INSERTED:        REMOVE: Y/N  A-LINE: Y/N     DATE INSERTED:              REMOVE: Y/N    GLOBAL ISSUE/BEST PRACTICE:  Analgesia:  Sedation:  HOB elevation: yes  Stress ulcer prophylaxis:  VTE prophylaxis:  Glycemic control:  Nutrition:    CODE STATUS: ***  [facility] discussion: Y       Patient is a 72y old  Female who presents with a chief complaint of sepsis (04 Jun 2023 12:17)    Interval events: 73yo F with PMHx IDDM2, HFrEF, HTN, hypothyroidism, LBBB, chronic lymphedema presenting with septic shock 2/2 UTI. In April noted to have pansensitive E.coli UTI and treated with Bactrim. Now with decreased appetite and multiple episodes of emesis x2 days. Presenting with leukocytosis, lactic acidosis, DUNCAN and hyponatremia. CTAP with mild left hydronephrosis with no evidence of obstructing stone, urology consulted recommending medical management. Pt hypotensive in ED responsive to IVF but MAP < 65, started on levophed. Given IV azactam in ED (hx penicillin allergy), transitioned to meropenem and admitted to ICU.    Patient seen and examined at bedside. Has been afebrile, saturating well on 2LNC. Noted to be retching, no emesis. She is complaining of nausea and fatigue but otherwise feels ok. Given 4mg Zofran IVP x1 in ED, additional 4 IVP upon arrival to unit. EKG noted to have prolonged .    Review of Systems:  Constitutional: no fever, chills, +fatigue  Neuro: no headache, numbness, weakness  Resp: no cough, wheezing, shortness of breath  CVS: no chest pain, palpitations, leg swelling  GI: no abdominal pain, +nausea and vomiting  : no dysuria, frequency, incontinence  Skin: no itching, burning, rashes, or lesions   Msk: no joint pain or swelling  Psych: no depression, anxiety    T(F): 98.4 (06-04-23 @ 09:15), Max: 98.4 (06-04-23 @ 09:15)  HR: 70 (06-04-23 @ 12:00) (60 - 136)  BP: 132/55 (06-04-23 @ 12:00) (1/- - 157/60)  RR: 22 (06-04-23 @ 12:00) (16 - 30)  SpO2: 95% (06-04-23 @ 12:00) (86% - 100%)  Wt(kg): --        CAPILLARY BLOOD GLUCOSE      POCT Blood Glucose.: 433 mg/dL (04 Jun 2023 10:30)    I&O's Summary    04 Jun 2023 07:01 - 04 Jun 2023 13:00  --------------------------------------------------------  IN: 1061.6 mL / OUT: 0 mL / NET: 1061.6 mL        Physical Exam:     Gen: ill appearing, NAD  Neuro: awake and alert, responds to all questions and commands  HEENT: NCAT, EOMI, PERRL  CV: RRR, s1s2, no murmurs noted  Pulm: CTAB, no wheezing/rales/rhonchi  GI: soft NTND, +BS, no suprapubic or CVA tenderness  Ext: trace b/l LE edema  Skin: WWP    Meds:  MEDICATIONS  (STANDING):  aspirin  chewable 81 milliGRAM(s) Oral daily  atorvastatin 80 milliGRAM(s) Oral at bedtime  cefTRIAXone   IVPB 2000 milliGRAM(s) IV Intermittent every 24 hours  dextrose 5%. 1000 milliLiter(s) (100 mL/Hr) IV Continuous <Continuous>  dextrose 5%. 1000 milliLiter(s) (50 mL/Hr) IV Continuous <Continuous>  dextrose 50% Injectable 12.5 Gram(s) IV Push once  dextrose 50% Injectable 25 Gram(s) IV Push once  dextrose 50% Injectable 25 Gram(s) IV Push once  glucagon  Injectable 1 milliGRAM(s) IntraMuscular once  heparin   Injectable 7500 Unit(s) SubCutaneous every 8 hours  insulin glargine Injectable (LANTUS) 20 Unit(s) SubCutaneous at bedtime  insulin lispro (ADMELOG) corrective regimen sliding scale   SubCutaneous every 4 hours  levothyroxine 75 MICROGram(s) Oral daily  norepinephrine Infusion 0.05 MICROgram(s)/kG/Min (29 mL/Hr) IV Continuous <Continuous>    MEDICATIONS  (PRN):  dextrose Oral Gel 15 Gram(s) Oral once PRN Blood Glucose LESS THAN 70 milliGRAM(s)/deciliter                            11.4   26.34 )-----------( 183      ( 04 Jun 2023 01:52 )             34.6     Bands 8.0    06-04    127<L>  |  89<L>  |  91<H>  ----------------------------<  333<H>  5.1   |  25  |  4.30<H>    Ca    9.0      04 Jun 2023 01:52    TPro  7.8  /  Alb  2.9<L>  /  TBili  0.9  /  DBili  x   /  AST  38<H>  /  ALT  22  /  AlkPhos  89  06-04    Lactate 2.4           06-04 @ 08:38    Lactate 3.6           06-04 @ 01:52                        Radiology:   CTAP w/ oral contrast  IMPRESSION:  Mild left hydronephrosis is new. Left ureter not dilated. Suggestion of   left renal pelvis wall thickening not well evaluated without IV contrast.   Urinary tract infection could appear this way though not definitive.   Early or mild proximal left ureteral obstruction also possible though no   stone or other obstructing lesion is evident.    No other significant change. No bowel obstruction.    CENTRAL LINE: N  DOS SANTOS: Y  A-LINE: N    GLOBAL ISSUE/BEST PRACTICE:  Analgesia: N  Sedation: N  HOB elevation: yes  Stress ulcer prophylaxis: N  VTE prophylaxis: heparin subq  Glycemic control: Lantus 20u qhs, ISS  Nutrition: DASH/TLC    CODE STATUS: Full code

## 2023-06-05 DIAGNOSIS — R11.0 NAUSEA: ICD-10-CM

## 2023-06-05 LAB
A1C WITH ESTIMATED AVERAGE GLUCOSE RESULT: 8.2 % — HIGH (ref 4–5.6)
ALBUMIN SERPL ELPH-MCNC: 2.4 G/DL — LOW (ref 3.3–5)
ALP SERPL-CCNC: 107 U/L — SIGNIFICANT CHANGE UP (ref 40–120)
ALT FLD-CCNC: 22 U/L — SIGNIFICANT CHANGE UP (ref 12–78)
ANION GAP SERPL CALC-SCNC: 14 MMOL/L — SIGNIFICANT CHANGE UP (ref 5–17)
AST SERPL-CCNC: 26 U/L — SIGNIFICANT CHANGE UP (ref 15–37)
BASOPHILS # BLD AUTO: 0.09 K/UL — SIGNIFICANT CHANGE UP (ref 0–0.2)
BASOPHILS NFR BLD AUTO: 0.4 % — SIGNIFICANT CHANGE UP (ref 0–2)
BILIRUB SERPL-MCNC: 0.6 MG/DL — SIGNIFICANT CHANGE UP (ref 0.2–1.2)
BUN SERPL-MCNC: 110 MG/DL — HIGH (ref 7–23)
CALCIUM SERPL-MCNC: 8.4 MG/DL — LOW (ref 8.5–10.1)
CHLORIDE SERPL-SCNC: 96 MMOL/L — SIGNIFICANT CHANGE UP (ref 96–108)
CO2 SERPL-SCNC: 18 MMOL/L — LOW (ref 22–31)
CREAT SERPL-MCNC: 5 MG/DL — HIGH (ref 0.5–1.3)
CULTURE RESULTS: SIGNIFICANT CHANGE UP
EGFR: 9 ML/MIN/1.73M2 — LOW
EOSINOPHIL # BLD AUTO: 0.35 K/UL — SIGNIFICANT CHANGE UP (ref 0–0.5)
EOSINOPHIL NFR BLD AUTO: 1.5 % — SIGNIFICANT CHANGE UP (ref 0–6)
ESTIMATED AVERAGE GLUCOSE: 189 MG/DL — HIGH (ref 68–114)
GLUCOSE SERPL-MCNC: 220 MG/DL — HIGH (ref 70–99)
HCT VFR BLD CALC: 38 % — SIGNIFICANT CHANGE UP (ref 34.5–45)
HGB BLD-MCNC: 12.2 G/DL — SIGNIFICANT CHANGE UP (ref 11.5–15.5)
IMM GRANULOCYTES NFR BLD AUTO: 0.8 % — SIGNIFICANT CHANGE UP (ref 0–0.9)
LYMPHOCYTES # BLD AUTO: 0.51 K/UL — LOW (ref 1–3.3)
LYMPHOCYTES # BLD AUTO: 2.2 % — LOW (ref 13–44)
MAGNESIUM SERPL-MCNC: 2.4 MG/DL — SIGNIFICANT CHANGE UP (ref 1.6–2.6)
MCHC RBC-ENTMCNC: 29.8 PG — SIGNIFICANT CHANGE UP (ref 27–34)
MCHC RBC-ENTMCNC: 32.1 GM/DL — SIGNIFICANT CHANGE UP (ref 32–36)
MCV RBC AUTO: 92.7 FL — SIGNIFICANT CHANGE UP (ref 80–100)
MONOCYTES # BLD AUTO: 2.39 K/UL — HIGH (ref 0–0.9)
MONOCYTES NFR BLD AUTO: 10.4 % — SIGNIFICANT CHANGE UP (ref 2–14)
MRSA PCR RESULT.: SIGNIFICANT CHANGE UP
NEUTROPHILS # BLD AUTO: 19.46 K/UL — HIGH (ref 1.8–7.4)
NEUTROPHILS NFR BLD AUTO: 84.7 % — HIGH (ref 43–77)
NRBC # BLD: 0 /100 WBCS — SIGNIFICANT CHANGE UP (ref 0–0)
PHOSPHATE SERPL-MCNC: 4.7 MG/DL — HIGH (ref 2.5–4.5)
PLATELET # BLD AUTO: 176 K/UL — SIGNIFICANT CHANGE UP (ref 150–400)
POTASSIUM SERPL-MCNC: 5 MMOL/L — SIGNIFICANT CHANGE UP (ref 3.5–5.3)
POTASSIUM SERPL-SCNC: 5 MMOL/L — SIGNIFICANT CHANGE UP (ref 3.5–5.3)
PROT SERPL-MCNC: 7.5 G/DL — SIGNIFICANT CHANGE UP (ref 6–8.3)
RBC # BLD: 4.1 M/UL — SIGNIFICANT CHANGE UP (ref 3.8–5.2)
RBC # FLD: 13.8 % — SIGNIFICANT CHANGE UP (ref 10.3–14.5)
S AUREUS DNA NOSE QL NAA+PROBE: DETECTED
SODIUM SERPL-SCNC: 128 MMOL/L — LOW (ref 135–145)
SPECIMEN SOURCE: SIGNIFICANT CHANGE UP
WBC # BLD: 22.98 K/UL — HIGH (ref 3.8–10.5)
WBC # FLD AUTO: 22.98 K/UL — HIGH (ref 3.8–10.5)

## 2023-06-05 PROCEDURE — 93010 ELECTROCARDIOGRAM REPORT: CPT

## 2023-06-05 PROCEDURE — 99233 SBSQ HOSP IP/OBS HIGH 50: CPT | Mod: GC

## 2023-06-05 RX ORDER — INSULIN HUMAN 100 [IU]/ML
2 INJECTION, SOLUTION SUBCUTANEOUS
Qty: 100 | Refills: 0 | Status: DISCONTINUED | OUTPATIENT
Start: 2023-06-05 | End: 2023-06-05

## 2023-06-05 RX ORDER — INSULIN GLARGINE 100 [IU]/ML
25 INJECTION, SOLUTION SUBCUTANEOUS EVERY MORNING
Refills: 0 | Status: DISCONTINUED | OUTPATIENT
Start: 2023-06-05 | End: 2023-06-14

## 2023-06-05 RX ORDER — NYSTATIN CREAM 100000 [USP'U]/G
1 CREAM TOPICAL
Refills: 0 | Status: DISCONTINUED | OUTPATIENT
Start: 2023-06-05 | End: 2023-06-19

## 2023-06-05 RX ORDER — PROCHLORPERAZINE MALEATE 5 MG
5 TABLET ORAL ONCE
Refills: 0 | Status: COMPLETED | OUTPATIENT
Start: 2023-06-05 | End: 2023-06-05

## 2023-06-05 RX ORDER — PANTOPRAZOLE SODIUM 20 MG/1
40 TABLET, DELAYED RELEASE ORAL DAILY
Refills: 0 | Status: DISCONTINUED | OUTPATIENT
Start: 2023-06-05 | End: 2023-06-09

## 2023-06-05 RX ORDER — INSULIN LISPRO 100/ML
VIAL (ML) SUBCUTANEOUS EVERY 4 HOURS
Refills: 0 | Status: DISCONTINUED | OUTPATIENT
Start: 2023-06-05 | End: 2023-06-05

## 2023-06-05 RX ORDER — INSULIN HUMAN 100 [IU]/ML
1 INJECTION, SOLUTION SUBCUTANEOUS
Qty: 100 | Refills: 0 | Status: DISCONTINUED | OUTPATIENT
Start: 2023-06-05 | End: 2023-06-05

## 2023-06-05 RX ORDER — ONDANSETRON 8 MG/1
4 TABLET, FILM COATED ORAL ONCE
Refills: 0 | Status: COMPLETED | OUTPATIENT
Start: 2023-06-05 | End: 2023-06-05

## 2023-06-05 RX ORDER — INSULIN LISPRO 100/ML
VIAL (ML) SUBCUTANEOUS EVERY 4 HOURS
Refills: 0 | Status: DISCONTINUED | OUTPATIENT
Start: 2023-06-05 | End: 2023-06-06

## 2023-06-05 RX ADMIN — HEPARIN SODIUM 7500 UNIT(S): 5000 INJECTION INTRAVENOUS; SUBCUTANEOUS at 21:18

## 2023-06-05 RX ADMIN — ONDANSETRON 4 MILLIGRAM(S): 8 TABLET, FILM COATED ORAL at 06:50

## 2023-06-05 RX ADMIN — HEPARIN SODIUM 7500 UNIT(S): 5000 INJECTION INTRAVENOUS; SUBCUTANEOUS at 05:15

## 2023-06-05 RX ADMIN — PANTOPRAZOLE SODIUM 40 MILLIGRAM(S): 20 TABLET, DELAYED RELEASE ORAL at 12:04

## 2023-06-05 RX ADMIN — HEPARIN SODIUM 7500 UNIT(S): 5000 INJECTION INTRAVENOUS; SUBCUTANEOUS at 15:15

## 2023-06-05 RX ADMIN — Medication 51 MILLIGRAM(S): at 10:06

## 2023-06-05 RX ADMIN — Medication 81 MILLIGRAM(S): at 12:03

## 2023-06-05 RX ADMIN — INSULIN GLARGINE 25 UNIT(S): 100 INJECTION, SOLUTION SUBCUTANEOUS at 08:26

## 2023-06-05 RX ADMIN — CEFTRIAXONE 100 MILLIGRAM(S): 500 INJECTION, POWDER, FOR SOLUTION INTRAMUSCULAR; INTRAVENOUS at 12:15

## 2023-06-05 RX ADMIN — Medication 4: at 21:28

## 2023-06-05 RX ADMIN — Medication 75 MICROGRAM(S): at 05:16

## 2023-06-05 RX ADMIN — Medication 200 MILLIGRAM(S): at 17:07

## 2023-06-05 RX ADMIN — Medication 29 MICROGRAM(S)/KG/MIN: at 02:11

## 2023-06-05 RX ADMIN — Medication 2: at 19:24

## 2023-06-05 RX ADMIN — ATORVASTATIN CALCIUM 80 MILLIGRAM(S): 80 TABLET, FILM COATED ORAL at 21:18

## 2023-06-05 RX ADMIN — NYSTATIN CREAM 1 APPLICATION(S): 100000 CREAM TOPICAL at 17:06

## 2023-06-05 RX ADMIN — Medication 4: at 15:00

## 2023-06-05 NOTE — PROGRESS NOTE ADULT - SUBJECTIVE AND OBJECTIVE BOX
Date of Service 23 @ 14:19    Patient is a 72y old  Female who presents with a chief complaint of sepsis (2023 06:50)      INTERVAL /OVERNIGHT EVENTS: felt nauseous earlier    MEDICATIONS  (STANDING):  aspirin  chewable 81 milliGRAM(s) Oral daily  atorvastatin 80 milliGRAM(s) Oral at bedtime  cefTRIAXone   IVPB 2000 milliGRAM(s) IV Intermittent every 24 hours  dextrose 50% Injectable 50 milliLiter(s) IV Push every 15 minutes  dextrose 50% Injectable 25 milliLiter(s) IV Push every 15 minutes  heparin   Injectable 7500 Unit(s) SubCutaneous every 8 hours  insulin glargine Injectable (LANTUS) 25 Unit(s) SubCutaneous every morning  insulin lispro (ADMELOG) corrective regimen sliding scale   SubCutaneous every 4 hours  levothyroxine 75 MICROGram(s) Oral daily  pantoprazole  Injectable 40 milliGRAM(s) IV Push daily    MEDICATIONS  (PRN):  trimethobenzamide Injectable 200 milliGRAM(s) IntraMuscular every 8 hours PRN Nausea and/or Vomiting      Allergies    penicillins (Hives)    Intolerances        REVIEW OF SYSTEMS:  CONSTITUTIONAL: No fever, weight loss, or fatigue  EYES: No eye pain, visual disturbances, or discharge  ENMT:  No difficulty hearing, tinnitus, vertigo; No sinus or throat pain  NECK: No pain or stiffness  RESPIRATORY: No cough, wheezing, chills or hemoptysis; No shortness of breath  CARDIOVASCULAR: No chest pain, palpitations, dizziness, or leg swelling  GASTROINTESTINAL: No abdominal or epigastric pain. + nausea  GENITOURINARY: No dysuria, frequency, hematuria, or incontinence  NEUROLOGICAL: No headaches, memory loss, loss of strength, numbness, or tremors  SKIN: No itching, burning, rashes, or lesions   LYMPH NODES: No enlarged glands  ENDOCRINE: No heat or cold intolerance; No hair loss; No polydipsia or polyuria  MUSCULOSKELETAL: No joint pain or swelling; No muscle, back, or extremity pain  PSYCHIATRIC: No depression, anxiety, mood swings, or difficulty sleeping  HEME/LYMPH: No easy bruising, or bleeding gums  ALLERGY AND IMMUNOLOGIC: No hives or eczema    Vital Signs Last 24 Hrs  T(C): 36.7 (2023 12:20), Max: 36.8 (2023 23:40)  T(F): 98 (2023 12:20), Max: 98.3 (2023 23:40)  HR: 58 (2023 13:30) (55 - 82)  BP: 116/80 (2023 13:30) (74/39 - 141/54)  BP(mean): 92 (2023 13:30) (52 - 106)  RR: 19 (2023 13:30) (14 - 27)  SpO2: 100% (2023 13:30) (91% - 100%)    Parameters below as of 2023 13:00  Patient On (Oxygen Delivery Method): nasal cannula  O2 Flow (L/min): 2      PHYSICAL EXAM:  GENERAL: NAD, well-groomed, well-developed  HEAD:  Atraumatic, Normocephalic  EYES: EOMI, PERRLA, conjunctiva and sclera clear  ENMT: No tonsillar erythema, exudates, or enlargement; Moist mucous membranes, Good dentition, No lesions  NECK: Supple, No JVD, Normal thyroid  NERVOUS SYSTEM:  Alert & Oriented X3, Good concentration; Motor Strength 5/5 B/L upper and lower extremities; DTRs 2+ intact and symmetric  CHEST/LUNG: Clear to auscultation bilaterally; No rales, rhonchi, wheezing, or rubs  HEART: Regular rate and rhythm; No murmurs, rubs, or gallops  ABDOMEN: Soft, Nontender, Nondistended; Bowel sounds present  EXTREMITIES:  2+ Peripheral Pulses, No clubbing, cyanosis, + edema  LYMPH: No lymphadenopathy noted  SKIN: No rashes or lesions    LABS:                        12.2   22.98 )-----------( 176      ( 2023 06:10 )             38.0     2023 06:10    128    |  96     |  110    ----------------------------<  220    5.0     |  18     |  5.00     Ca    8.4        2023 06:10  Phos  4.7       2023 06:10  Mg     2.4       2023 06:10    TPro  7.5    /  Alb  2.4    /  TBili  0.6    /  DBili  x      /  AST  26     /  ALT  22     /  AlkPhos  107    2023 06:10      Urinalysis Basic - ( 2023 12:00 )    Color: Dark Yellow / Appearance: Turbid / S.016 / pH: x  Gluc: x / Ketone: Trace mg/dL  / Bili: Negative / Urobili: 1.0 mg/dL   Blood: x / Protein: 100 mg/dL / Nitrite: Negative   Leuk Esterase: Large / RBC: 6 /HPF / WBC Too Numerous to count /HPF   Sq Epi: x / Non Sq Epi: x / Bacteria: Moderate /HPF      CAPILLARY BLOOD GLUCOSE  223 (2023 09:00)  227 (2023 08:00)  202 (2023 07:00)  216 (2023 06:00)  207 (2023 05:00)  218 (2023 04:00)  213 (2023 03:00)  189 (2023 02:00)  173 (2023 01:00)  101 (2023 00:00)  101 (2023 23:00)  86 (2023 22:00)  11 (2023 21:00)  144 (2023 20:00)  363 (2023 15:00)      POCT Blood Glucose.: 234 mg/dL (2023 13:30)  POCT Blood Glucose.: 219 mg/dL (2023 12:00)  POCT Blood Glucose.: 219 mg/dL (2023 10:58)  POCT Blood Glucose.: 209 mg/dL (2023 09:58)  POCT Blood Glucose.: 223 mg/dL (2023 09:02)  POCT Blood Glucose.: 227 mg/dL (2023 07:53)  POCT Blood Glucose.: 202 mg/dL (2023 06:55)  POCT Blood Glucose.: 216 mg/dL (2023 06:15)  POCT Blood Glucose.: 207 mg/dL (2023 05:13)  POCT Blood Glucose.: 218 mg/dL (2023 04:21)  POCT Blood Glucose.: 213 mg/dL (2023 03:25)  POCT Blood Glucose.: 189 mg/dL (2023 02:07)  POCT Blood Glucose.: 173 mg/dL (2023 01:24)  POCT Blood Glucose.: 101 mg/dL (2023 00:17)  POCT Blood Glucose.: 101 mg/dL (2023 23:21)  POCT Blood Glucose.: 86 mg/dL (2023 22:02)  POCT Blood Glucose.: 111 mg/dL (2023 21:30)  POCT Blood Glucose.: 144 mg/dL (2023 20:23)  POCT Blood Glucose.: 208 mg/dL (2023 18:56)  POCT Blood Glucose.: 264 mg/dL (2023 18:06)  POCT Blood Glucose.: 329 mg/dL (2023 17:03)  POCT Blood Glucose.: 328 mg/dL (2023 16:01)  POCT Blood Glucose.: 363 mg/dL (2023 14:56)      RADIOLOGY & ADDITIONAL TESTS:    Notes Reviewed:  [x ] YES  [ ] NO    Care Discussed with Consultants/Other Providers [x ] YES  [ ] NO

## 2023-06-05 NOTE — PROVIDER CONTACT NOTE (OTHER) - SITUATION
fingerstick at 8am 227, 9am 223
fingerstick 209
fingerstick 403, repeat 433
fingerstick 403, repeated 427

## 2023-06-05 NOTE — PROGRESS NOTE ADULT - ASSESSMENT
Impression:  1. septic shock  2. acute UTI  3. Ecoli bacteremia  4. DUNCAN  5. hyponatremia  6. hyperglycemia    Plan:  Neuro - stable, nonfocal, no confusion/seizure activity    CV - actively titrating pressors to keep MAP>65         low dose pressors currently         if escalates will add stress steroids for CIRCI         no CP, EKG nonishcemic, trop likely demand with sepsis and DUNCAN, holding all HF meds in face of pressor needs         ASA and high intensity statin    Pulm -  stable currently, no SOB on RA, sats>90%    GI -  add PPI, guaic +, H/H stable, no melena/hematochezia/hematemesis    Renal - Cr elevated on presentation, most consistent with ATN is septic shock, avoid MAP<65, mild hydro not significant, urology consulted, renally adjust all meds, avoid nephrotoxins, strict I/Os, fu               renal , repeat BMP in am     Heme -  Pharmacologic DVT PPx  in addition to SCD's, H/H stable.    ID - afebrile, BCx + Ecoli, prior Ecoli UTI pansensitive, IV abx with Ctx,  f/u sensitivities, f/u UCx, abx adjustments based on discussion with ID in conjunction with clinical features and culture data.     Endo - CII infusion with titration to maintain euglycemia 881680cy/dl, check A1c, TSH normal, synthroid at maintenance dose

## 2023-06-05 NOTE — PROGRESS NOTE ADULT - SUBJECTIVE AND OBJECTIVE BOX
Interval Events: No overnight events. Patient seen and examined at bedside this AM. She reports nausea and vomiting this AM, worse with PO intake.    Review of Systems:  Constitutional: No fever, chills, fatigue  Neuro: No headache, numbness, weakness  Resp: No cough, wheezing, shortness of breath  CVS: No chest pain, palpitations, leg swelling  GI: +N/V, No abdominal pain, diarrhea   : No dysuria, frequency, incontinence  Skin: No itching, burning, rashes, or lesions   Msk: No joint pain or swelling  Psych: No depression, anxiety, mood swings    ICU Vital Signs Last 24 Hrs  T(C): 36.7 (2023 12:20), Max: 36.8 (2023 23:40)  T(F): 98 (2023 12:20), Max: 98.3 (2023 23:40)  HR: 57 (2023 15:00) (55 - 82)  BP: 112/64 (2023 15:00) (74/39 - 137/55)  BP(mean): 84 (2023 15:00) (52 - 106)  ABP: --  ABP(mean): --  RR: 16 (2023 15:00) (14 - 27)  SpO2: 100% (2023 15:00) (91% - 100%)    O2 Parameters below as of 2023 13:00  Patient On (Oxygen Delivery Method): nasal cannula  O2 Flow (L/min): 2            23 @ 07:  -  23 @ 07:00  --------------------------------------------------------  IN: 1403.7 mL / OUT: 180 mL / NET: 1223.7 mL    23 @ 07:01  -  23 @ 15:18  --------------------------------------------------------  IN: 54 mL / OUT: 0 mL / NET: 54 mL        CAPILLARY BLOOD GLUCOSE  223 (2023 09:00)      POCT Blood Glucose.: 233 mg/dL (2023 14:58)      I&O's Summary    2023 07:01  -  2023 07:00  --------------------------------------------------------  IN: 1403.7 mL / OUT: 180 mL / NET: 1223.7 mL    2023 07:01  -  2023 15:18  --------------------------------------------------------  IN: 54 mL / OUT: 0 mL / NET: 54 mL        Physical Exam:   Gen: +ill appearing, NAD  Neuro: awake and alert, responds to all questions and commands  HEENT: NCAT, EOMI, PERRL  CV: RRR, s1s2, + systolic murmurs  Pulm: CTAB, no wheezing/rales/rhonchi  GI: soft NTND, +BS, no suprapubic or CVA tenderness  Ext: trace b/l LE edema  Skin: WWP    Meds:  cefTRIAXone   IVPB IV Intermittent      atorvastatin Oral  dextrose 50% Injectable IV Push  dextrose 50% Injectable IV Push  insulin glargine Injectable (LANTUS) SubCutaneous  insulin lispro (ADMELOG) corrective regimen sliding scale SubCutaneous  levothyroxine Oral      trimethobenzamide Injectable IntraMuscular PRN      aspirin  chewable Oral  heparin   Injectable SubCutaneous    pantoprazole  Injectable IV Push                              12.2   22.98 )-----------( 176      ( 2023 06:10 )             38.0       06-05    128<L>  |  96  |  110<H>  ----------------------------<  220<H>  5.0   |  18<L>  |  5.00<H>    Ca    8.4<L>      2023 06:10  Phos  4.7     06-05  Mg     2.4     06-05    TPro  7.5  /  Alb  2.4<L>  /  TBili  0.6  /  DBili  x   /  AST  26  /  ALT  22  /  AlkPhos  107  06-05            Urinalysis Basic - ( 2023 12:00 )    Color: Dark Yellow / Appearance: Turbid / S.016 / pH: x  Gluc: x / Ketone: Trace mg/dL  / Bili: Negative / Urobili: 1.0 mg/dL   Blood: x / Protein: 100 mg/dL / Nitrite: Negative   Leuk Esterase: Large / RBC: 6 /HPF / WBC Too Numerous to count /HPF   Sq Epi: x / Non Sq Epi: x / Bacteria: Moderate /HPF      .Blood Blood-Peripheral   Growth in aerobic bottle: Gram Negative Rods  Growth in anaerobic bottle: Gram Negative Rods  ***Blood Panel PCR results on this specimen are available  approximately 3 hours after the Gram stain result.***  Gram stain, PCR, and/or culture results may not always  correspond due to difference in methodologies.  ************************************************************  This PCR assay was performed by multiplex PCR. This  Assay tests for 66 bacterial and resistance gene targets.  Please refer to the [facility] test directory  at [email or web address] for details.   Growth in aerobic bottle: Gram Negative Rods  Growth in anaerobic bottle: Gram Negative Rods  @ 01:52              Radiology:  CT A/P (23): Mild left hydronephrosis is new. Left ureter not dilated. Suggestion of   left renal pelvis wall thickening not well evaluated without IV contrast.   Urinary tract infection could appear this way though not definitive.   Early or mild proximal left ureteral obstruction also possible though no   stone or other obstructing lesion is evident.    No other significant change. No bowel obstruction.    CXR 23: No radiographic evidence of acute cardiopulmonary disease.      Bedside Ultrasound:    Tubes/Lines: Peripheral IVs      GLOBAL ISSUE/BEST PRACTICE:  Analgesia: Y  Sedation: N  HOB elevation: Y  Stress ulcer prophylaxis: Y  VTE prophylaxis: Y  Glycemic control: Y  Nutrition: Y    CODE STATUS: Full Code       Interval Events: No overnight events. Patient seen and examined at bedside this AM. She reports nausea and vomiting this AM, worse with PO intake.    Review of Systems:  Constitutional: No fever, chills, fatigue  Neuro: No headache, numbness, weakness  Resp: No cough, wheezing, shortness of breath  CVS: No chest pain, palpitations, leg swelling  GI: +N/V, No abdominal pain, diarrhea   : No dysuria, frequency, incontinence  Skin: No itching, burning, rashes, or lesions   Msk: No joint pain or swelling  Psych: No depression, anxiety, mood swings    ICU Vital Signs Last 24 Hrs  T(C): 36.7 (2023 12:20), Max: 36.8 (2023 23:40)  T(F): 98 (2023 12:20), Max: 98.3 (2023 23:40)  HR: 57 (2023 15:00) (55 - 82)  BP: 112/64 (2023 15:00) (74/39 - 137/55)  BP(mean): 84 (2023 15:00) (52 - 106)  ABP: --  ABP(mean): --  RR: 16 (2023 15:00) (14 - 27)  SpO2: 100% (2023 15:00) (91% - 100%)    O2 Parameters below as of 2023 13:00  Patient On (Oxygen Delivery Method): nasal cannula  O2 Flow (L/min): 2            23 @ 07:  -  23 @ 07:00  --------------------------------------------------------  IN: 1403.7 mL / OUT: 180 mL / NET: 1223.7 mL    23 @ 07:01  -  23 @ 15:18  --------------------------------------------------------  IN: 54 mL / OUT: 0 mL / NET: 54 mL        CAPILLARY BLOOD GLUCOSE  223 (2023 09:00)      POCT Blood Glucose.: 233 mg/dL (2023 14:58)      I&O's Summary    2023 07:01  -  2023 07:00  --------------------------------------------------------  IN: 1403.7 mL / OUT: 180 mL / NET: 1223.7 mL    2023 07:01  -  2023 15:18  --------------------------------------------------------  IN: 54 mL / OUT: 0 mL / NET: 54 mL        Physical Exam:   Gen: nauseated and retching  Neuro: awake and alert, responds to all questions and commands  HEENT: NCAT, EOMI, PERRL  CV: RRR, s1s2, + systolic murmurs  Pulm: CTAB, no wheezing/rales/rhonchi  GI: soft NTND, +BS, no suprapubic or CVA tenderness  Ext: trace b/l LE edema  Skin: WWP    Meds:  cefTRIAXone   IVPB IV Intermittent      atorvastatin Oral  dextrose 50% Injectable IV Push  dextrose 50% Injectable IV Push  insulin glargine Injectable (LANTUS) SubCutaneous  insulin lispro (ADMELOG) corrective regimen sliding scale SubCutaneous  levothyroxine Oral      trimethobenzamide Injectable IntraMuscular PRN      aspirin  chewable Oral  heparin   Injectable SubCutaneous    pantoprazole  Injectable IV Push                              12.2   22.98 )-----------( 176      ( 2023 06:10 )             38.0       06-05    128<L>  |  96  |  110<H>  ----------------------------<  220<H>  5.0   |  18<L>  |  5.00<H>    Ca    8.4<L>      2023 06:10  Phos  4.7     06-05  Mg     2.4     06-05    TPro  7.5  /  Alb  2.4<L>  /  TBili  0.6  /  DBili  x   /  AST  26  /  ALT  22  /  AlkPhos  107  06-05            Urinalysis Basic - ( 2023 12:00 )    Color: Dark Yellow / Appearance: Turbid / S.016 / pH: x  Gluc: x / Ketone: Trace mg/dL  / Bili: Negative / Urobili: 1.0 mg/dL   Blood: x / Protein: 100 mg/dL / Nitrite: Negative   Leuk Esterase: Large / RBC: 6 /HPF / WBC Too Numerous to count /HPF   Sq Epi: x / Non Sq Epi: x / Bacteria: Moderate /HPF      .Blood Blood-Peripheral   Growth in aerobic bottle: Gram Negative Rods  Growth in anaerobic bottle: Gram Negative Rods  ***Blood Panel PCR results on this specimen are available  approximately 3 hours after the Gram stain result.***  Gram stain, PCR, and/or culture results may not always  correspond due to difference in methodologies.  ************************************************************  This PCR assay was performed by multiplex PCR. This  Assay tests for 66 bacterial and resistance gene targets.  Please refer to the [facility] test directory  at [email or web address] for details.   Growth in aerobic bottle: Gram Negative Rods  Growth in anaerobic bottle: Gram Negative Rods  @ 01:52              Radiology:  CT A/P (23): Mild left hydronephrosis is new. Left ureter not dilated. Suggestion of   left renal pelvis wall thickening not well evaluated without IV contrast.   Urinary tract infection could appear this way though not definitive.   Early or mild proximal left ureteral obstruction also possible though no   stone or other obstructing lesion is evident.    No other significant change. No bowel obstruction.    CXR 23: No radiographic evidence of acute cardiopulmonary disease.      Bedside Ultrasound: poor echo windows but reduced LV    Tubes/Lines: Peripheral IVs      GLOBAL ISSUE/BEST PRACTICE:  Analgesia: Y  Sedation: N  HOB elevation: Y  Stress ulcer prophylaxis: Y  VTE prophylaxis: Y  Glycemic control: Y  Nutrition: Y    CODE STATUS: Full Code

## 2023-06-05 NOTE — PROGRESS NOTE ADULT - ASSESSMENT
71yo F with PMHx IDDM2, HFrEF, HTN, hypothyroidism, LBBB, chronic lymphedema presenting with decreased appetite and multiple episodes of emesis. Recent pansensitive E.coli UTI in April treated with Bactrim. Now with L hydronephrosis noted on CT. Admitted to ICU for septic shock 2/2 UTI.    Neuro: awake and alert, no active issues  Cardio: Septic shock responsive to IVF. Patient maintaining MAPS off levo. May use prn to maintain MAP > 65. Continue to hold home entresto, eplerenone and metoprolol in setting of sepsis and ATN. May restart HF meds as volume status requires and BP allows. QTc 501, Avoid QTc prolonging meds.  Pulm: saturating well on 2LNC, wean as tolerated. No s/s volume overload.  GI: NPO currently, will trial CLD given N/V. Responds well to Compazine, will continue with Tigan PRN between doses.  Renal: L hydronephrosis without obstructing stone noted on CT. Urology consulted, recommending medical management. Hyponatremia likely 2/2 dehydration, s/p 3LNS since admission, monitor. Nephro consulted. Start [place] Garza cath for urine monitoring and to relieve possible obstruction F/u Urine electrolytes  ID: Septic shock 2/2 UTI. Monitor leukocytosis. Continue Rocephin (penicillin allergy, has tolerated Rocephin in the past). Blood and Urine cultures growing E. coli sensitive to Rocephin.  Endo: Was on Insulin gtt due to resistant hyperglycemia in setting of acute illness, has since been discontinued. Continue Lantus 25u qhs, ISS.  Heme: VTE ppx heparin subq  Dispo: Full code

## 2023-06-05 NOTE — PROGRESS NOTE ADULT - SUBJECTIVE AND OBJECTIVE BOX
Patient is a 72y old  Female who presents with a chief complaint of sepsis (2023 10:59)       HPI: female with history of insulin-dependent diabetes, CHF, hypertension who presented to the ED with nausea and vomiting for several days.  She has experienced no significant abdominal pain and history of multiple dark emesis and 1 episode of diarrhea today.  She denies fever or chills.  Denies URI symptoms.  She denies significant change in her urinary patters. CT performed on admission demonstrated mild left hydronephrosis without ureteral dilatation or obstructing calculus. Found to have DUNCAN and hypotension. Renal consulted for further eval. Has not seen a Nephrologist outpatient. Currently asymptomatic.      Having nausea and vomiting    PAST MEDICAL & SURGICAL HISTORY:  Hypertension      Diabetes      Lymphedema      H/O left bundle branch block      History of left bundle branch block (LBBB)      Systolic heart failure, chronic      H/O cataract        History of surgical removal of meniscus of knee  left in       Frozen shoulder        H/O Achilles tendon repair  lengthened bilaterally,       Fractured skull             FAMILY HISTORY:  Family history of CVA  mom, age 57    [place]    Social History:Non smoker    MEDICATIONS  (STANDING):  aspirin  chewable 81 milliGRAM(s) Oral daily  atorvastatin 80 milliGRAM(s) Oral at bedtime  cefTRIAXone   IVPB 2000 milliGRAM(s) IV Intermittent every 24 hours  dextrose 5%. 1000 milliLiter(s) (50 mL/Hr) IV Continuous <Continuous>  dextrose 5%. 1000 milliLiter(s) (100 mL/Hr) IV Continuous <Continuous>  dextrose 50% Injectable 25 Gram(s) IV Push once  dextrose 50% Injectable 25 Gram(s) IV Push once  dextrose 50% Injectable 12.5 Gram(s) IV Push once  glucagon  Injectable 1 milliGRAM(s) IntraMuscular once  heparin   Injectable 7500 Unit(s) SubCutaneous every 8 hours  insulin glargine Injectable (LANTUS) 20 Unit(s) SubCutaneous at bedtime  insulin lispro (ADMELOG) corrective regimen sliding scale   SubCutaneous every 4 hours  levothyroxine 75 MICROGram(s) Oral daily  norepinephrine Infusion 0.05 MICROgram(s)/kG/Min (29 mL/Hr) IV Continuous <Continuous>    MEDICATIONS  (PRN):  dextrose Oral Gel 15 Gram(s) Oral once PRN Blood Glucose LESS THAN 70 milliGRAM(s)/deciliter   Meds reviewed    Allergies    penicillins (Hives)    Intolerances         REVIEW OF SYSTEMS:    Review of Systems:   Constitutional: Denies fatigue  HEENT: Denies headaches and dizziness  Respiratory: denies SOB, cough, or wheezing  Cardiovascular: denies CP, palpitations  Gastrointestinal: + N/V  Genitourinary: denies painful urination, increased frequency, urgency, or bloody urine  Skin: denies rashes or itching  Musculoskeletal: denies muscle aches, joint swelling  Neurologic: Denies generalized weakness, denies loss of sensation, numbness, or tingling      ICU Vital Signs Last 24 Hrs  T(C): 36.6 (2023 15:45), Max: 36.8 (2023 23:40)  T(F): 97.8 (2023 15:45), Max: 98.3 (2023 23:40)  HR: 61 (2023 17:00) (55 - 82)  BP: 109/49 (2023 17:00) (74/39 - 145/62)  BP(mean): 71 (2023 17:00) (52 - 106)  ABP: --  ABP(mean): --  RR: 24 (2023 17:00) (14 - 27)  SpO2: 100% (2023 17:00) (95% - 100%)    O2 Parameters below as of 2023 16:30  Patient On (Oxygen Delivery Method): room air          PHYSICAL EXAM:    GENERAL: NAD  HEAD:  Atraumatic, Normocephalic  EYES: EOMI, conjunctiva and sclera clear  ENMT: No Drainage from nares, No drainage from ears  NECK: Supple, neck  veins full  NERVOUS SYSTEM:  Awake and Alert  CHEST/LUNG: Clear to percussion bilaterally; No rales, rhonchi, wheezing, or rubs  HEART: Regular rate and rhythm; No murmurs, rubs, or gallops  ABDOMEN: Soft, Nontender, Nondistended; Bowel sounds present, obese  EXTREMITIES:  + Edema  SKIN: No rashes No obvious ecchymosis      LABS:                          12.2   22.98 )-----------( 176      ( 2023 06:10 )             38.0     06-05    128<L>  |  96  |  110<H>  ----------------------------<  220<H>  5.0   |  18<L>  |  5.00<H>    Ca    8.4<L>      2023 06:10  Phos  4.7     06-05  Mg     2.4     06-05    TPro  7.5  /  Alb  2.4<L>  /  TBili  0.6  /  DBili  x   /  AST  26  /  ALT  22  /  AlkPhos  107  06-05      Urinalysis Basic - ( 2023 12:00 )    Color: Dark Yellow / Appearance: Turbid / S.016 / pH: x  Gluc: x / Ketone: Trace mg/dL  / Bili: Negative / Urobili: 1.0 mg/dL   Blood: x / Protein: 100 mg/dL / Nitrite: Negative   Leuk Esterase: Large / RBC: 6 /HPF / WBC Too Numerous to count /HPF   Sq Epi: x / Non Sq Epi: x / Bacteria: Moderate /HPF

## 2023-06-05 NOTE — PROGRESS NOTE ADULT - ASSESSMENT
Acute on CKD Stage 4  Sepsis/UTI  L Hydronephrosis      -DUNCAN from sepsis/hypotension and renal hypoperfusion; unilateral mild hydro should not cause this degree of DUNCAN  -Check urine indices  -IVF  -Abx, renal dosing  -Urology eval noted  -Monitor chemistries and urine output  -Creatinine worsening  -Consented for dialysis should it be necessary, will likely need tomorrow  -Discussed with patient risks and benefits of dialysis in depth up to and including death, she agrees to dialysis if necessary  -Her symptoms may be from uremia    D/W ICU     Thank you

## 2023-06-05 NOTE — PROGRESS NOTE ADULT - SUBJECTIVE AND OBJECTIVE BOX
EVELYN LOPEZ is a 72yFemale , patient examined and chart reviewed.    INTERVAL HPI/ OVERNIGHT EVENTS:   Feeling nauseous. Afebrile.  Blood cultures with Ecoli.    PAST MEDICAL & SURGICAL HISTORY:  Hypertension  Diabetes  Lymphedema  H/O left bundle branch block  History of left bundle branch block (LBBB)  Systolic heart failure, chronic  H/O cataract    History of surgical removal of meniscus of knee  left in   Frozen shoulder    H/O Achilles tendon repair  lengthened bilaterally,   Fractured skull        For details regarding the patient's social history, family history, and other miscellaneous elements, please refer the initial infectious diseases consultation and/or the admitting history and physical examination for this admission.     ROS:  CONSTITUTIONAL:  Negative fever or chills  EYES:  Negative  blurry vision or double vision  CARDIOVASCULAR:  Negative for chest pain or palpitations  RESPIRATORY:  Negative for cough, wheezing, or SOB   GASTROINTESTINAL:  Negative for vomiting, diarrhea, constipation, or abdominal pain + nausea,  GENITOURINARY:  Negative frequency, urgency or dysuria  NEUROLOGIC:  No headache, confusion, dizziness, lightheadedness  All other systems were reviewed and are negative     ALLERGIES  penicillins (Hives)      Current inpatient medications :    ANTIBIOTICS/RELEVANT:  cefTRIAXone   IVPB 2000 milliGRAM(s) IV Intermittent every 24 hours      aspirin  chewable 81 milliGRAM(s) Oral daily  atorvastatin 80 milliGRAM(s) Oral at bedtime  dextrose 50% Injectable 50 milliLiter(s) IV Push every 15 minutes  dextrose 50% Injectable 25 milliLiter(s) IV Push every 15 minutes  heparin   Injectable 7500 Unit(s) SubCutaneous every 8 hours  insulin glargine Injectable (LANTUS) 25 Unit(s) SubCutaneous every morning  insulin lispro (ADMELOG) corrective regimen sliding scale   SubCutaneous every 4 hours  levothyroxine 75 MICROGram(s) Oral daily  nystatin Powder 1 Application(s) Topical two times a day  pantoprazole  Injectable 40 milliGRAM(s) IV Push daily  trimethobenzamide Injectable 200 milliGRAM(s) IntraMuscular every 8 hours PRN      Objective:     @ 07:01  -   @ 07:00  --------------------------------------------------------  IN: 1403.7 mL / OUT: 180 mL / NET: 1223.7 mL    06- @ 07:01  -   @ 16:37  --------------------------------------------------------  IN: 54 mL / OUT: 0 mL / NET: 54 mL      T(C): 36.6 (23 @ 15:45), Max: 36.8 (23 @ 23:40)  HR: 57 (23 @ 15:00) (55 - 82)  BP: 112/64 (23 @ 15:00) (74/39 - 137/55)  RR: 16 (23 @ 15:00) (14 - 27)  SpO2: 100% (23 @ 15:00) (98% - 100%)  Wt(kg): --      Physical Exam:  General: no acute distress  Neck: supple, trachea midline  Lungs: clear, no wheeze/rhonchi  Cardiovascular: regular rate and rhythm, S1 S2  Abdomen: soft, nontender,  bowel sounds normal  Neurological: alert and oriented x3  Skin: no rash  Extremities: + edema      LABS:                          12.2   22.98 )-----------( 176      ( 2023 06:10 )             38.0       06-05    128<L>  |  96  |  110<H>  ----------------------------<  220<H>  5.0   |  18<L>  |  5.00<H>    Ca    8.4<L>      2023 06:10  Phos  4.7     06-05  Mg     2.4     06-05    TPro  7.5  /  Alb  2.4<L>  /  TBili  0.6  /  DBili  x   /  AST  26  /  ALT  22  /  AlkPhos  107  06-05        Urinalysis Basic - ( 2023 12:00 )    Color: Dark Yellow / Appearance: Turbid / S.016 / pH: x  Gluc: x / Ketone: Trace mg/dL  / Bili: Negative / Urobili: 1.0 mg/dL   Blood: x / Protein: 100 mg/dL / Nitrite: Negative   Leuk Esterase: Large / RBC: 6 /HPF / WBC Too Numerous to count /HPF   Sq Epi: x / Non Sq Epi: x / Bacteria: Moderate /HPF    MICROBIOLOGY:    Culture - Urine (collected 2023 12:00)  Source: Clean Catch Clean Catch (Midstream)  Final Report (2023 15:32):    <10,000 CFU/mL Normal Urogenital Radha    Culture - Blood (collected 2023 01:52)  Source: .Blood Blood-Peripheral  Gram Stain (2023 22:53):    Growth in aerobic bottle: Gram Negative Rods    Growth in anaerobic bottle: Gram Negative Rods  Preliminary Report (2023 22:54):    Growth in aerobic bottle: Gram Negative Rods    Growth in anaerobic bottle: Gram Negative Rods    Culture - Blood (collected 2023 01:52)  Source: .Blood Blood-Peripheral  Gram Stain (2023 21:29):    Growth in aerobic bottle: Gram Negative Rods    Growth in anaerobic bottle: Gram Negative Rods  Preliminary Report (2023 15:23):    Growth in aerobic and anaerobic bottles: Escherichia coli    ***Blood Panel PCR results on this specimen are available    approximately 3 hours after the Gram stain result.***    Gram stain, PCR, and/or culture results may not always    correspond due to difference in methodologies.    ************************************************************    This PCR assay was performed by multiplex PCR. This    Assay tests for 66 bacterial and resistance gene targets.    Please refer to the [facility] Labs test directory    at [email or web address] for details.  Organism: Blood Culture PCR (2023 22:50)  Organism: Blood Culture PCR (2023 22:50)      Method Type: PCR      -  Escherichia coli: Detec      RADIOLOGY & ADDITIONAL STUDIES:    ACC: [id number] EXAM:  CT ABDOMEN AND PELVIS OC   ORDERED BY:  PEDRO LUIS MARTIN     PROCEDURE DATE:  2023          INTERPRETATION:  CLINICAL INFORMATION: Vomiting and abdominal pain.    COMPARISON: 2023 CT abdomen pelvis    CONTRAST/COMPLICATIONS:  IV Contrast: NONE  Oral Contrast: Omnipaque 300  Complications: None reported at time of study completion    PROCEDURE:  CT of the Abdomen and Pelvis was performed.  Sagittal and coronal reformats were performed.    FINDINGS:  LOWER CHEST: Within normal limits.    LIVER: Within normal limits.  BILE DUCTS: Normal caliber.  GALLBLADDER: Within normal limits.  SPLEEN: Within normal limits.  PANCREAS: Within normal limits.  ADRENALS: Within normal limits.  KIDNEYS/URETERS: Mild left hydronephrosis but with no stone or other   obstructing lesion identified. Suggestion of left renal pelvis and   ureteral wall thickening not well evaluated without IV contrast. Kidneys   otherwise similar to prior with mild bilateral perinephric stranding. No   concerning renal mass.    BLADDER: Nondilated. No stones or filling defects.  REPRODUCTIVE ORGANS: Small calcification in the uterus. No concerning   findings.    BOWEL: No bowel obstruction. Appendix is normal.  PERITONEUM: No ascites.  VESSELS:No abdominal aortic aneurysm. Atherosclerosis similar to prior.  RETROPERITONEUM/LYMPH NODES: No lymphadenopathy.  ABDOMINAL WALL: Within normal limits.  BONES: No acute findings. Bilateral L5 spondylolysis with mild   degenerative anterolisthesis ofL5 on S1. Prominent degenerative changes   lower lumbar spine.    IMPRESSION:  Mild left hydronephrosis is new. Left ureter not dilated. Suggestion of   left renal pelvis wall thickening not well evaluated without IV contrast.   Urinary tract infection could appear this way though not definitive.   Early or mild proximal left ureteral obstruction also possible though no   stone or other obstructing lesion is evident.    No other significant change. No bowel obstruction.    Assessment :   71yo F with PMHx IDDM2, HFrEF, HTN, hypothyroidism, LBBB, chronic lymphedema admitted with septic shock and Ecoli bacteremia sec Left pyelo with hydro- no obtructive uropathy on CT    Off pressors    Plan :   Cont Rocephin  Repeat blood cultures  Trend temps and cbc  Urology on case  Pulm toileting  Increase activity/OOB to chair    D/w ICU attending    Continue with present regiment.  Appropriate use of antibiotics and adverse effects reviewed.      > 35 minutes were spent in direct patient care reviewing notes, medications ,labs data/ imaging , discussion with multidisciplinary team.    Thank you for allowing me to participate in care of your patient .    Robby Clark MD  Infectious Disease  [phone]

## 2023-06-05 NOTE — PROGRESS NOTE ADULT - SUBJECTIVE AND OBJECTIVE BOX
INTERVAL HPI/OVERNIGHT EVENTS: Afebrile    MEDICATIONS  (STANDING):  aspirin  chewable 81 milliGRAM(s) Oral daily  atorvastatin 80 milliGRAM(s) Oral at bedtime  cefTRIAXone   IVPB 2000 milliGRAM(s) IV Intermittent every 24 hours  dextrose 50% Injectable 50 milliLiter(s) IV Push every 15 minutes  dextrose 50% Injectable 25 milliLiter(s) IV Push every 15 minutes  heparin   Injectable 7500 Unit(s) SubCutaneous every 8 hours  insulin regular Infusion 1 Unit(s)/Hr (1 mL/Hr) IV Continuous <Continuous>  levothyroxine 75 MICROGram(s) Oral daily  norepinephrine Infusion 0.05 MICROgram(s)/kG/Min (29 mL/Hr) IV Continuous <Continuous>  ondansetron Injectable 4 milliGRAM(s) IV Push once  pantoprazole  Injectable 40 milliGRAM(s) IV Push daily    MEDICATIONS  (PRN):        Vital Signs Last 24 Hrs  T(C): 36.7 (2023 04:29), Max: 37.1 (2023 12:30)  T(F): 98 (2023 04:29), Max: 98.8 (2023 12:30)  HR: 63 (2023 05:30) (60 - 82)  BP: 120/56 (2023 05:30) (1/- - 157/60)  BP(mean): 80 (2023 05:30) (52 - 91)  RR: 22 (2023 05:30) (16 - 30)  SpO2: 100% (2023 05:30) (86% - 100%)    Parameters below as of 2023 20:00  Patient On (Oxygen Delivery Method): nasal cannula, 3 liters        PHYSICAL EXAM:    ABDOMEN: Soft. No CVA tenderness      LABS:                        12.2   22.98 )-----------( 176      ( 2023 06:10 )             38.0     06-04    127<L>  |  89<L>  |  91<H>  ----------------------------<  333<H>  5.1   |  25  |  4.30<H>    Ca    9.0      2023 01:52    TPro  7.8  /  Alb  2.9<L>  /  TBili  0.9  /  DBili  x   /  AST  38<H>  /  ALT  22  /  AlkPhos  89        Urinalysis Basic - ( 2023 12:00 )    Color: Dark Yellow / Appearance: Turbid / S.016 / pH: x  Gluc: x / Ketone: Trace mg/dL  / Bili: Negative / Urobili: 1.0 mg/dL   Blood: x / Protein: 100 mg/dL / Nitrite: Negative   Leuk Esterase: Large / RBC: 6 /HPF / WBC Too Numerous to count /HPF   Sq Epi: x / Non Sq Epi: x / Bacteria: Moderate /HPF      Urine culture:   @ 01:52 --   Growth in aerobic bottle: Gram Negative Rods  Growth in anaerobic bottle: Gram Negative Rods  ***Blood Panel PCR results on this specimen are available  approximately 3 hours after the Gram stain result.***  Gram stain, PCR, and/or culture results may not always  correspond due to difference in methodologies.  ************************************************************  This PCR assay was performed by multiplex PCR. This  Assay tests for 66 bacterial and resistance gene targets.  Please refer to the [facility] Labs test directory  at [email or web address] for details.

## 2023-06-05 NOTE — PROGRESS NOTE ADULT - SUBJECTIVE AND OBJECTIVE BOX
Patient is a 72y old  Female who presents with a chief complaint of sepsis (2023 18:24)      BRIEF HOSPITAL COURSE:   71F with PMHx DM, HFrEF, HTN, hypothyroid, LBBB, chronic LE lymphedema, hx of Ecoli UTI who was admitted with septic shock secondary to UTI, DUNCAN with mild L hydro, and hyponatremia.       Events last 24 hours: afebrile, back on low dose pressors overnight, BCx Ecoli, on insulin infusion for intractable hyperglycemia. Denies CP, SOB, abd pain, N/V.     PAST MEDICAL & SURGICAL HISTORY:  Hypertension      Diabetes      Lymphedema      H/O left bundle branch block      History of left bundle branch block (LBBB)      Systolic heart failure, chronic      H/O cataract        History of surgical removal of meniscus of knee  left in       Frozen shoulder        H/O Achilles tendon repair  lengthened bilaterally,       Fractured skull          Allergies    penicillins (Hives)    Intolerances      FAMILY HISTORY:  Family history of CVA  mom, age 57        Review of Systems:  12 pt ROS negative unless otherwise stated above.      Medications:  cefTRIAXone   IVPB 2000 milliGRAM(s) IV Intermittent every 24 hours    norepinephrine Infusion 0.05 MICROgram(s)/kG/Min IV Continuous <Continuous>          aspirin  chewable 81 milliGRAM(s) Oral daily  heparin   Injectable 7500 Unit(s) SubCutaneous every 8 hours        atorvastatin 80 milliGRAM(s) Oral at bedtime  dextrose 50% Injectable 50 milliLiter(s) IV Push every 15 minutes  dextrose 50% Injectable 25 milliLiter(s) IV Push every 15 minutes  insulin regular Infusion 8 Unit(s)/Hr IV Continuous <Continuous>  levothyroxine 75 MICROGram(s) Oral daily                  ICU Vital Signs Last 24 Hrs  T(C): 36.8 (2023 23:40), Max: 37.1 (2023 12:30)  T(F): 98.3 (2023 23:40), Max: 98.8 (2023 12:30)  HR: 68 (2023 00:00) (60 - 88)  BP: 109/55 (2023 00:00) (1/- - 157/60)  BP(mean): 79 (2023 00:00) (53 - 91)  ABP: --  ABP(mean): --  RR: 27 (2023 00:00) (16 - 30)  SpO2: 99% (2023 00:00) (86% - 100%)    O2 Parameters below as of 2023 20:00  Patient On (Oxygen Delivery Method): nasal cannula, 3 liters          Vital Signs Last 24 Hrs  T(C): 36.8 (2023 23:40), Max: 37.1 (2023 12:30)  T(F): 98.3 (2023 23:40), Max: 98.8 (2023 12:30)  HR: 68 (2023 00:00) (60 - 88)  BP: 109/55 (2023 00:00) (1/- - 157/60)  BP(mean): 79 (2023 00:00) (53 - 91)  RR: 27 (2023 00:00) (16 - 30)  SpO2: 99% (2023 00:00) (86% - 100%)    Parameters below as of 2023 20:00  Patient On (Oxygen Delivery Method): nasal cannula, 3 liters            I&O's Detail    2023 07:01  -  2023 00:40  --------------------------------------------------------  IN:    Insulin: 45.5 mL    IV PiggyBack: 50 mL    Norepinephrine: 101.3 mL    Oral Fluid: 120 mL    Sodium Chloride 0.9% Bolus: 1000 mL  Total IN: 1316.8 mL    OUT:    Voided (mL): 50 mL  Total OUT: 50 mL    Total NET: 1266.8 mL            LABS:                        11.4   26.34 )-----------( 183      ( 2023 01:52 )             34.6     06-04    127<L>  |  89<L>  |  91<H>  ----------------------------<  333<H>  5.1   |  25  |  4.30<H>    Ca    9.0      2023 01:52    TPro  7.8  /  Alb  2.9<L>  /  TBili  0.9  /  DBili  x   /  AST  38<H>  /  ALT  22  /  AlkPhos  89            CAPILLARY BLOOD GLUCOSE  86 (2023 22:00)      POCT Blood Glucose.: 101 mg/dL (2023 00:17)      Urinalysis Basic - ( 2023 12:00 )    Color: Dark Yellow / Appearance: Turbid / S.016 / pH: x  Gluc: x / Ketone: Trace mg/dL  / Bili: Negative / Urobili: 1.0 mg/dL   Blood: x / Protein: 100 mg/dL / Nitrite: Negative   Leuk Esterase: Large / RBC: 6 /HPF / WBC Too Numerous to count /HPF   Sq Epi: x / Non Sq Epi: x / Bacteria: Moderate /HPF      CULTURES:  Culture Results:   Growth in aerobic bottle: Gram Negative Rods  Growth in anaerobic bottle: Gram Negative Rods  ***Blood Panel PCR results on this specimen are available  approximately 3 hours after the Gram stain result.***  Gram stain, PCR, and/or culture results may not always  correspond due to difference in methodologies.  ************************************************************  This PCR assay was performed by multiplex PCR. This  Assay tests for 66 bacterial and resistance gene targets.  Please refer to the [facility] Labs test directory  at [email or web address] for details. ( @ 01:52)  Culture Results:   Growth in aerobic bottle: Gram Negative Rods  Growth in anaerobic bottle: Gram Negative Rods ( @ 01:52)      Physical Examination:    General: No acute distress.  Alert, oriented x 3, interactive, nonfocal    HEENT: Pupils equal, reactive to light.  Symmetric.    PULM: Clear to auscultation bilaterally    CVS: Regular rate and rhythm    ABD: obese, Soft, nondistended, nontender, normoactive bowel sounds, no rebound/guarding, no CVA tenderness    EXT: chronic LE edema/stasis, nontender    SKIN: Warm and well perfused    RADIOLOGY:  ACC: [id number] EXAM:  CT ABDOMEN AND PELVIS OC   ORDERED BY:  PEDRO LUIS MARTIN     PROCEDURE DATE:  2023          INTERPRETATION:  CLINICAL INFORMATION: Vomiting and abdominal pain.    COMPARISON: 2023 CT abdomen pelvis    CONTRAST/COMPLICATIONS:  IV Contrast: NONE  Oral Contrast: Omnipaque 300  Complications: None reported at time of study completion    PROCEDURE:  CT of the Abdomen and Pelvis was performed.  Sagittal and coronal reformats were performed.    FINDINGS:  LOWER CHEST: Within normal limits.    LIVER: Within normal limits.  BILE DUCTS: Normal caliber.  GALLBLADDER: Within normal limits.  SPLEEN: Within normal limits.  PANCREAS: Within normal limits.  ADRENALS: Within normal limits.  KIDNEYS/URETERS: Mild left hydronephrosis but with no stone or other   obstructing lesion identified. Suggestion of left renal pelvis and   ureteral wall thickening not well evaluated without IV contrast. Kidneys   otherwise similar to prior with mild bilateral perinephric stranding. No   concerning renal mass.    BLADDER: Nondilated. No stones or filling defects.  REPRODUCTIVE ORGANS: Small calcification in the uterus. No concerning   findings.    BOWEL: No bowel obstruction. Appendix is normal.  PERITONEUM: No ascites.  VESSELS: No abdominal aortic aneurysm. Atherosclerosis similar to prior.  RETROPERITONEUM/LYMPH NODES: No lymphadenopathy.  ABDOMINAL WALL: Within normal limits.  BONES: No acute findings. Bilateral L5 spondylolysis with mild   degenerative anterolisthesis of L5 on S1. Prominent degenerative changes   lower lumbar spine.    IMPRESSION:  Mild left hydronephrosis is new. Left ureter not dilated. Suggestion of   left renal pelvis wall thickening not well evaluated without IV contrast.   Urinary tract infection could appear this way though not definitive.   Early or mild proximal left ureteral obstruction also possible though no   stone or other obstructing lesion is evident.    No other significant change. No bowel obstruction.        --- End of Report ---             WILLIAM ALEGRE MD; Attending Radiologist  This document has been electronically signed. 2023  4:06AM    CRITICAL CARE TIME SPENT:  35 mins assessing presenting problems of acute illness that poses high probability of life threatening deterioration or end organ damage/dysfunction.  Medical decision making incllding Initiating plan of care, reviewing data, reviewing radiology, direct patient bedside evaluation and interpretation of vital signs, any necessary ventilator management , discussion with multidisciplinary team, all non inclusive of procedures.

## 2023-06-06 LAB
-  AMIKACIN: SIGNIFICANT CHANGE UP
-  AMPICILLIN/SULBACTAM: SIGNIFICANT CHANGE UP
-  AMPICILLIN: SIGNIFICANT CHANGE UP
-  AZTREONAM: SIGNIFICANT CHANGE UP
-  CEFAZOLIN: SIGNIFICANT CHANGE UP
-  CEFEPIME: SIGNIFICANT CHANGE UP
-  CEFOXITIN: SIGNIFICANT CHANGE UP
-  CEFTRIAXONE: SIGNIFICANT CHANGE UP
-  CIPROFLOXACIN: SIGNIFICANT CHANGE UP
-  ERTAPENEM: SIGNIFICANT CHANGE UP
-  GENTAMICIN: SIGNIFICANT CHANGE UP
-  IMIPENEM: SIGNIFICANT CHANGE UP
-  LEVOFLOXACIN: SIGNIFICANT CHANGE UP
-  MEROPENEM: SIGNIFICANT CHANGE UP
-  PIPERACILLIN/TAZOBACTAM: SIGNIFICANT CHANGE UP
-  TOBRAMYCIN: SIGNIFICANT CHANGE UP
-  TRIMETHOPRIM/SULFAMETHOXAZOLE: SIGNIFICANT CHANGE UP
ALBUMIN SERPL ELPH-MCNC: 2.2 G/DL — LOW (ref 3.3–5)
ALP SERPL-CCNC: 105 U/L — SIGNIFICANT CHANGE UP (ref 40–120)
ALT FLD-CCNC: 15 U/L — SIGNIFICANT CHANGE UP (ref 12–78)
ANION GAP SERPL CALC-SCNC: 10 MMOL/L — SIGNIFICANT CHANGE UP (ref 5–17)
APTT BLD: 27.8 SEC — SIGNIFICANT CHANGE UP (ref 27.5–35.5)
AST SERPL-CCNC: 15 U/L — SIGNIFICANT CHANGE UP (ref 15–37)
BASOPHILS # BLD AUTO: 0.09 K/UL — SIGNIFICANT CHANGE UP (ref 0–0.2)
BASOPHILS NFR BLD AUTO: 0.5 % — SIGNIFICANT CHANGE UP (ref 0–2)
BILIRUB SERPL-MCNC: 0.4 MG/DL — SIGNIFICANT CHANGE UP (ref 0.2–1.2)
BUN SERPL-MCNC: 126 MG/DL — HIGH (ref 7–23)
CALCIUM SERPL-MCNC: 8.5 MG/DL — SIGNIFICANT CHANGE UP (ref 8.5–10.1)
CHLORIDE SERPL-SCNC: 95 MMOL/L — LOW (ref 96–108)
CO2 SERPL-SCNC: 25 MMOL/L — SIGNIFICANT CHANGE UP (ref 22–31)
CREAT SERPL-MCNC: 5.4 MG/DL — HIGH (ref 0.5–1.3)
CULTURE RESULTS: SIGNIFICANT CHANGE UP
CULTURE RESULTS: SIGNIFICANT CHANGE UP
EGFR: 8 ML/MIN/1.73M2 — LOW
EOSINOPHIL # BLD AUTO: 0.17 K/UL — SIGNIFICANT CHANGE UP (ref 0–0.5)
EOSINOPHIL NFR BLD AUTO: 1 % — SIGNIFICANT CHANGE UP (ref 0–6)
GLUCOSE SERPL-MCNC: 171 MG/DL — HIGH (ref 70–99)
HAV IGM SER-ACNC: SIGNIFICANT CHANGE UP
HBV CORE IGM SER-ACNC: SIGNIFICANT CHANGE UP
HBV SURFACE AB SER-ACNC: SIGNIFICANT CHANGE UP
HBV SURFACE AG SER-ACNC: SIGNIFICANT CHANGE UP
HCT VFR BLD CALC: 34.2 % — LOW (ref 34.5–45)
HCV AB S/CO SERPL IA: 0.15 S/CO — SIGNIFICANT CHANGE UP (ref 0–0.99)
HCV AB SERPL-IMP: SIGNIFICANT CHANGE UP
HGB BLD-MCNC: 11.4 G/DL — LOW (ref 11.5–15.5)
IMM GRANULOCYTES NFR BLD AUTO: 0.8 % — SIGNIFICANT CHANGE UP (ref 0–0.9)
INR BLD: 1.09 RATIO — SIGNIFICANT CHANGE UP (ref 0.88–1.16)
LYMPHOCYTES # BLD AUTO: 0.38 K/UL — LOW (ref 1–3.3)
LYMPHOCYTES # BLD AUTO: 2.2 % — LOW (ref 13–44)
MAGNESIUM SERPL-MCNC: 2.4 MG/DL — SIGNIFICANT CHANGE UP (ref 1.6–2.6)
MCHC RBC-ENTMCNC: 29.6 PG — SIGNIFICANT CHANGE UP (ref 27–34)
MCHC RBC-ENTMCNC: 33.3 GM/DL — SIGNIFICANT CHANGE UP (ref 32–36)
MCV RBC AUTO: 88.8 FL — SIGNIFICANT CHANGE UP (ref 80–100)
METHOD TYPE: SIGNIFICANT CHANGE UP
MONOCYTES # BLD AUTO: 1.64 K/UL — HIGH (ref 0–0.9)
MONOCYTES NFR BLD AUTO: 9.5 % — SIGNIFICANT CHANGE UP (ref 2–14)
NEUTROPHILS # BLD AUTO: 14.77 K/UL — HIGH (ref 1.8–7.4)
NEUTROPHILS NFR BLD AUTO: 86 % — HIGH (ref 43–77)
NRBC # BLD: 0 /100 WBCS — SIGNIFICANT CHANGE UP (ref 0–0)
ORGANISM # SPEC MICROSCOPIC CNT: SIGNIFICANT CHANGE UP
PHOSPHATE SERPL-MCNC: 5.3 MG/DL — HIGH (ref 2.5–4.5)
PLATELET # BLD AUTO: 170 K/UL — SIGNIFICANT CHANGE UP (ref 150–400)
POTASSIUM SERPL-MCNC: 5 MMOL/L — SIGNIFICANT CHANGE UP (ref 3.5–5.3)
POTASSIUM SERPL-SCNC: 5 MMOL/L — SIGNIFICANT CHANGE UP (ref 3.5–5.3)
PROT SERPL-MCNC: 6.7 G/DL — SIGNIFICANT CHANGE UP (ref 6–8.3)
PROTHROM AB SERPL-ACNC: 12.8 SEC — SIGNIFICANT CHANGE UP (ref 10.5–13.4)
RBC # BLD: 3.85 M/UL — SIGNIFICANT CHANGE UP (ref 3.8–5.2)
RBC # FLD: 13.5 % — SIGNIFICANT CHANGE UP (ref 10.3–14.5)
SODIUM SERPL-SCNC: 130 MMOL/L — LOW (ref 135–145)
SPECIMEN SOURCE: SIGNIFICANT CHANGE UP
SPECIMEN SOURCE: SIGNIFICANT CHANGE UP
WBC # BLD: 17.18 K/UL — HIGH (ref 3.8–10.5)
WBC # FLD AUTO: 17.18 K/UL — HIGH (ref 3.8–10.5)

## 2023-06-06 PROCEDURE — 99233 SBSQ HOSP IP/OBS HIGH 50: CPT | Mod: GC

## 2023-06-06 PROCEDURE — 71045 X-RAY EXAM CHEST 1 VIEW: CPT | Mod: 26

## 2023-06-06 PROCEDURE — 93306 TTE W/DOPPLER COMPLETE: CPT | Mod: 26

## 2023-06-06 RX ORDER — DIPHENHYDRAMINE HCL 50 MG
25 CAPSULE ORAL ONCE
Refills: 0 | Status: COMPLETED | OUTPATIENT
Start: 2023-06-06 | End: 2023-06-06

## 2023-06-06 RX ORDER — CHLORHEXIDINE GLUCONATE 213 G/1000ML
1 SOLUTION TOPICAL
Refills: 0 | Status: DISCONTINUED | OUTPATIENT
Start: 2023-06-06 | End: 2023-06-09

## 2023-06-06 RX ORDER — ALBUMIN HUMAN 25 %
100 VIAL (ML) INTRAVENOUS ONCE
Refills: 0 | Status: COMPLETED | OUTPATIENT
Start: 2023-06-06 | End: 2023-06-06

## 2023-06-06 RX ORDER — MIDODRINE HYDROCHLORIDE 2.5 MG/1
TABLET ORAL
Refills: 0 | Status: DISCONTINUED | OUTPATIENT
Start: 2023-06-06 | End: 2023-06-19

## 2023-06-06 RX ORDER — MIDODRINE HYDROCHLORIDE 2.5 MG/1
5 TABLET ORAL EVERY 8 HOURS
Refills: 0 | Status: DISCONTINUED | OUTPATIENT
Start: 2023-06-06 | End: 2023-06-06

## 2023-06-06 RX ORDER — INSULIN LISPRO 100/ML
VIAL (ML) SUBCUTANEOUS EVERY 6 HOURS
Refills: 0 | Status: DISCONTINUED | OUTPATIENT
Start: 2023-06-06 | End: 2023-06-08

## 2023-06-06 RX ORDER — ALBUMIN HUMAN 25 %
250 VIAL (ML) INTRAVENOUS ONCE
Refills: 0 | Status: COMPLETED | OUTPATIENT
Start: 2023-06-06 | End: 2023-06-06

## 2023-06-06 RX ORDER — MUPIROCIN 20 MG/G
1 OINTMENT TOPICAL
Refills: 0 | Status: COMPLETED | OUTPATIENT
Start: 2023-06-06 | End: 2023-06-11

## 2023-06-06 RX ORDER — SODIUM CHLORIDE 9 MG/ML
10 INJECTION INTRAMUSCULAR; INTRAVENOUS; SUBCUTANEOUS
Refills: 0 | Status: DISCONTINUED | OUTPATIENT
Start: 2023-06-06 | End: 2023-06-19

## 2023-06-06 RX ORDER — MIDODRINE HYDROCHLORIDE 2.5 MG/1
5 TABLET ORAL ONCE
Refills: 0 | Status: COMPLETED | OUTPATIENT
Start: 2023-06-06 | End: 2023-06-06

## 2023-06-06 RX ORDER — MIDODRINE HYDROCHLORIDE 2.5 MG/1
5 TABLET ORAL EVERY 8 HOURS
Refills: 0 | Status: DISCONTINUED | OUTPATIENT
Start: 2023-06-06 | End: 2023-06-19

## 2023-06-06 RX ADMIN — Medication 200 MILLIGRAM(S): at 02:31

## 2023-06-06 RX ADMIN — HEPARIN SODIUM 7500 UNIT(S): 5000 INJECTION INTRAVENOUS; SUBCUTANEOUS at 14:31

## 2023-06-06 RX ADMIN — Medication 2: at 10:03

## 2023-06-06 RX ADMIN — Medication 0.5 MILLIGRAM(S): at 11:51

## 2023-06-06 RX ADMIN — Medication 75 MICROGRAM(S): at 05:06

## 2023-06-06 RX ADMIN — HEPARIN SODIUM 7500 UNIT(S): 5000 INJECTION INTRAVENOUS; SUBCUTANEOUS at 05:06

## 2023-06-06 RX ADMIN — Medication 50 MILLILITER(S): at 17:47

## 2023-06-06 RX ADMIN — Medication 25 MILLIGRAM(S): at 13:05

## 2023-06-06 RX ADMIN — Medication 2: at 05:16

## 2023-06-06 RX ADMIN — PANTOPRAZOLE SODIUM 40 MILLIGRAM(S): 20 TABLET, DELAYED RELEASE ORAL at 11:55

## 2023-06-06 RX ADMIN — NYSTATIN CREAM 1 APPLICATION(S): 100000 CREAM TOPICAL at 05:06

## 2023-06-06 RX ADMIN — Medication 200 MILLIGRAM(S): at 09:42

## 2023-06-06 RX ADMIN — NYSTATIN CREAM 1 APPLICATION(S): 100000 CREAM TOPICAL at 17:46

## 2023-06-06 RX ADMIN — Medication 2: at 02:25

## 2023-06-06 RX ADMIN — MIDODRINE HYDROCHLORIDE 5 MILLIGRAM(S): 2.5 TABLET ORAL at 16:17

## 2023-06-06 RX ADMIN — MIDODRINE HYDROCHLORIDE 5 MILLIGRAM(S): 2.5 TABLET ORAL at 22:13

## 2023-06-06 RX ADMIN — Medication 81 MILLIGRAM(S): at 11:56

## 2023-06-06 RX ADMIN — HEPARIN SODIUM 7500 UNIT(S): 5000 INJECTION INTRAVENOUS; SUBCUTANEOUS at 22:13

## 2023-06-06 RX ADMIN — Medication 2: at 17:56

## 2023-06-06 RX ADMIN — INSULIN GLARGINE 25 UNIT(S): 100 INJECTION, SOLUTION SUBCUTANEOUS at 08:21

## 2023-06-06 RX ADMIN — MUPIROCIN 1 APPLICATION(S): 20 OINTMENT TOPICAL at 17:46

## 2023-06-06 RX ADMIN — CEFTRIAXONE 100 MILLIGRAM(S): 500 INJECTION, POWDER, FOR SOLUTION INTRAMUSCULAR; INTRAVENOUS at 11:56

## 2023-06-06 RX ADMIN — ATORVASTATIN CALCIUM 80 MILLIGRAM(S): 80 TABLET, FILM COATED ORAL at 22:12

## 2023-06-06 NOTE — PROGRESS NOTE ADULT - ASSESSMENT
71yo F with PMHx IDDM2, HFrEF, HTN, hypothyroidism, LBBB, chronic lymphedema presenting with decreased appetite and multiple episodes of emesis. Recent pansensitive E.coli UTI in April treated with Bactrim. Now with L hydronephrosis noted on CT. Admitted to ICU for septic shock 2/2 UTI.    Neuro: awake and alert, no active issues  Cardio: Septic shock responsive to IVF. Patient maintaining MAPS off levo. May use prn to maintain MAP > 65. Midodrine 5mg TID, with first dose pre-dialysis. May titrate down once patient HDS and no longer requires dialysis. Continue to hold home entresto, eplerenone and metoprolol in setting of sepsis and ATN. May restart HF meds as volume status requires and BP allows. QTc 501, Avoid QTc prolonging meds.  Pulm: saturating well on 2LNC, wean as tolerated. No s/s volume overload.  GI: NPO w/ sips and ice chips given N/V. Tigan prn. May use Compazine prn  Renal: Uptrending renal indices despite fluids and pressure control. Nephro consulted. Patient will receive dialysis today and as needed per Nephro and ICU. L hydronephrosis without obstructing stone noted on CT. Urology: medical management. Cont De La Garza cath  ID: Septic shock 2/2 UTI. Leukocytosis decreasing. Continue Rocephin (penicillin allergy, has tolerated Rocephin in the past). Blood and Urine cultures growing E. coli sensitive to Rocephin.  Endo: Continue Lantus 25u daily, ISS  Heme: VTE ppx heparin subq  Dispo: Full code

## 2023-06-06 NOTE — CARE COORDINATION ASSESSMENT. - NSDCPLANSERVICES_GEN_ALL_CORE
Pt anticipated to dc home when cleared. CM following, SW to remain available for any needs./No Anticipated Discharge Needs

## 2023-06-06 NOTE — CARE COORDINATION ASSESSMENT. - NSCAREPROVIDERS_GEN_ALL_CORE_FT
CARE PROVIDERS:  Accepting Physician: Luis Mcdermott  Access Services: Alec Thibodeaux  Admitting: Luis Mcdermott  Attending: Luis Mcdermott  Cardiology Technician: Hilary Pendleton  Consultant: Sunil Dumont  Consultant: Janessa Guadalupe  Consultant: Alec Maynard  Consultant: Robby Clark  Consultant: Weil, Patricia  Consultant: Anabella Person  Consultant: Parmjit Edwards  Covering Nurse: Deann Leo  Covering Team: Marcial Montague  ED Attending: Bonnie Fish  ED Nurse: Davy Joseph  HIM/Billing & Coding: Becka Dubon  Nurse: Mireya Whitten  Nurse: Ju Moses  Nurse: Yoly Winslow  Nurse: Ritchie Fisher  Nurse: Susie Arshad  Nurse: Libra Irvin  Nurse: Migdalia Moss  Nurse: Zink, Corinne  Nurse: Argentina De La Garza  Nurse: Yasmany Akins  Ordered: Doctor, Unknown  Ordered: ADM, User  Ordered: Physician, Ordering  Ordered: ServiceAccount, SCMMLM  PCA/Nursing Assistant: Annel Shafer  Primary Team: Jairo Garcia  Primary Team: Cr Rose  Primary Team: Vic Irizarry  Primary Team: Olena Wilburn  Primary Team: Gustavo Melgoza  Primary Team: Patrizia Knight  Primary Team: Jamil Rivas  Primary Team: Adelso Oliva  Registered Dietitian: Krys Suarez  Registered Dietitian: Cindy Ruiz  : Marilin Jeffries

## 2023-06-06 NOTE — PROGRESS NOTE ADULT - SUBJECTIVE AND OBJECTIVE BOX
Interval Events: There were no acute overnight events. Patient seen and examined at bedside. She reports persistent nausea that is somewhat reduced with Tigan. Denies any pain or SOB.    Review of Systems:  Constitutional: No fever, chills, fatigue  Neuro: No headache, numbness, weakness  Resp: No cough, wheezing, shortness of breath  CVS: No chest pain, palpitations, leg swelling  GI: +Nausea, No abdominal pain, vomiting, diarhea  : No dysuria, frequency, incontinence  Skin: No itching, burning, rashes, or lesions   Msk: No joint pain or swelling  Psych: No depression, anxiety, mood swings    ICU Vital Signs Last 24 Hrs  T(C): 36.7 (06 Jun 2023 11:55), Max: 36.9 (06 Jun 2023 00:10)  T(F): 98.1 (06 Jun 2023 11:55), Max: 98.4 (06 Jun 2023 00:10)  HR: 70 (06 Jun 2023 14:00) (57 - 77)  BP: 115/54 (06 Jun 2023 14:00) (87/43 - 145/62)  BP(mean): 78 (06 Jun 2023 14:00) (59 - 89)  ABP: --  ABP(mean): --  RR: 18 (06 Jun 2023 14:00) (13 - 24)  SpO2: 95% (06 Jun 2023 14:00) (91% - 100%)    O2 Parameters below as of 06 Jun 2023 07:00  Patient On (Oxygen Delivery Method): room air              06-05-23 @ 07:01  -  06-06-23 @ 07:00  --------------------------------------------------------  IN: 294 mL / OUT: 1070 mL / NET: -776 mL    06-06-23 @ 07:01  -  06-06-23 @ 14:48  --------------------------------------------------------  IN: 50 mL / OUT: 170 mL / NET: -120 mL        CAPILLARY BLOOD GLUCOSE  223 (05 Jun 2023 09:00)      POCT Blood Glucose.: 186 mg/dL (06 Jun 2023 09:59)      I&O's Summary    05 Jun 2023 07:01  -  06 Jun 2023 07:00  --------------------------------------------------------  IN: 294 mL / OUT: 1070 mL / NET: -776 mL    06 Jun 2023 07:01  -  06 Jun 2023 14:48  --------------------------------------------------------  IN: 50 mL / OUT: 170 mL / NET: -120 mL        Physical Exam:   Gen: nauseated and avoiding head movements  Neuro: awake and alert, responds to all questions and commands  HEENT: NCAT, EOMI, PERRL  CV: RRR, s1s2, + systolic murmurs  Pulm: CTAB, no wheezing/rales/rhonchi  GI: soft NTND, +BS, no suprapubic or CVA tenderness  Ext: trace b/l LE edema  Skin: WWP    Meds:  cefTRIAXone   IVPB IV Intermittent    midodrine Oral    atorvastatin Oral  dextrose 50% Injectable IV Push  dextrose 50% Injectable IV Push  insulin glargine Injectable (LANTUS) SubCutaneous  insulin lispro (ADMELOG) corrective regimen sliding scale SubCutaneous  levothyroxine Oral      trimethobenzamide Injectable IntraMuscular PRN      aspirin  chewable Oral  heparin   Injectable SubCutaneous    pantoprazole  Injectable IV Push      albumin human 25% IVPB IV Intermittent  sodium chloride 0.9% lock flush IV Push PRN      chlorhexidine 2% Cloths Topical  nystatin Powder Topical                              11.4   17.18 )-----------( 170      ( 06 Jun 2023 06:05 )             34.2       06-06    130<L>  |  95<L>  |  126<H>  ----------------------------<  171<H>  5.0   |  25  |  5.40<H>    Ca    8.5      06 Jun 2023 06:05  Phos  5.3     06-06  Mg     2.4     06-06    TPro  6.7  /  Alb  2.2<L>  /  TBili  0.4  /  DBili  x   /  AST  15  /  ALT  15  /  AlkPhos  105  06-06          PT/INR - ( 06 Jun 2023 09:30 )   PT: 12.8 sec;   INR: 1.09 ratio         PTT - ( 06 Jun 2023 09:30 )  PTT:27.8 sec    Clean Catch Clean Catch (Midstream)   <10,000 CFU/mL Normal Urogenital Radha -- 06-04 @ 12:00  .Blood Blood-Peripheral   Growth in aerobic and anaerobic bottles: Escherichia coli  ***Blood Panel PCR results on this specimen are available  approximately 3 hours after the Gram stain result.***  Gram stain, PCR, and/or culture results may not always  correspond due to difference in methodologies.  ************************************************************  This PCR assay was performed by multiplex PCR. This  Assay tests for 66 bacterial and resistance gene targets.  Please refer to the [facility] Labs test directory  at [email or web address] for details.   Growth in aerobic bottle: Gram Negative Rods  Growth in anaerobic bottle: Gram Negative Rods 06-04 @ 01:52              Radiology:  CXR:   1. Right-sided central line terminates in the SVC without pneumothorax.  2. Prominent left ventricle with engorgement of central pulmonary veins but without pulmonary edema.    Bedside Ultrasound: N/A    Tubes/Lines: R IJ catheter      GLOBAL ISSUE/BEST PRACTICE:  Analgesia: Y  Sedation: N  HOB elevation: Y  Stress ulcer prophylaxis: Y  VTE prophylaxis: Y  Glycemic control: Y   Nutrition: Y    CODE STATUS:  Full code       Interval Events: There were no acute overnight events. Patient seen and examined at bedside. She reports persistent nausea that is somewhat reduced with Tigan. Denies any pain or SOB.      ICU Vital Signs Last 24 Hrs  T(C): 36.7 (06 Jun 2023 11:55), Max: 36.9 (06 Jun 2023 00:10)  T(F): 98.1 (06 Jun 2023 11:55), Max: 98.4 (06 Jun 2023 00:10)  HR: 70 (06 Jun 2023 14:00) (57 - 77)  BP: 115/54 (06 Jun 2023 14:00) (87/43 - 145/62)  BP(mean): 78 (06 Jun 2023 14:00) (59 - 89)  ABP: --  ABP(mean): --  RR: 18 (06 Jun 2023 14:00) (13 - 24)  SpO2: 95% (06 Jun 2023 14:00) (91% - 100%)    O2 Parameters below as of 06 Jun 2023 07:00  Patient On (Oxygen Delivery Method): room air              06-05-23 @ 07:01  -  06-06-23 @ 07:00  --------------------------------------------------------  IN: 294 mL / OUT: 1070 mL / NET: -776 mL    06-06-23 @ 07:01  -  06-06-23 @ 14:48  --------------------------------------------------------  IN: 50 mL / OUT: 170 mL / NET: -120 mL        CAPILLARY BLOOD GLUCOSE  223 (05 Jun 2023 09:00)      POCT Blood Glucose.: 186 mg/dL (06 Jun 2023 09:59)      I&O's Summary    05 Jun 2023 07:01  -  06 Jun 2023 07:00  --------------------------------------------------------  IN: 294 mL / OUT: 1070 mL / NET: -776 mL    06 Jun 2023 07:01  -  06 Jun 2023 14:48  --------------------------------------------------------  IN: 50 mL / OUT: 170 mL / NET: -120 mL        Physical Exam:   Gen: nauseated and avoiding head movements  Neuro: awake and alert, responds to all questions and commands  HEENT: NCAT, EOMI, PERRL  CV: RRR, s1s2, + systolic murmurs  Pulm: CTAB, no wheezing/rales/rhonchi  GI: soft NTND, +BS, no suprapubic or CVA tenderness  Ext: trace b/l LE edema  Skin: WWP    Meds:  cefTRIAXone   IVPB IV Intermittent    midodrine Oral    atorvastatin Oral  dextrose 50% Injectable IV Push  dextrose 50% Injectable IV Push  insulin glargine Injectable (LANTUS) SubCutaneous  insulin lispro (ADMELOG) corrective regimen sliding scale SubCutaneous  levothyroxine Oral      trimethobenzamide Injectable IntraMuscular PRN      aspirin  chewable Oral  heparin   Injectable SubCutaneous    pantoprazole  Injectable IV Push      albumin human 25% IVPB IV Intermittent  sodium chloride 0.9% lock flush IV Push PRN      chlorhexidine 2% Cloths Topical  nystatin Powder Topical                              11.4   17.18 )-----------( 170      ( 06 Jun 2023 06:05 )             34.2       06-06    130<L>  |  95<L>  |  126<H>  ----------------------------<  171<H>  5.0   |  25  |  5.40<H>    Ca    8.5      06 Jun 2023 06:05  Phos  5.3     06-06  Mg     2.4     06-06    TPro  6.7  /  Alb  2.2<L>  /  TBili  0.4  /  DBili  x   /  AST  15  /  ALT  15  /  AlkPhos  105  06-06          PT/INR - ( 06 Jun 2023 09:30 )   PT: 12.8 sec;   INR: 1.09 ratio         PTT - ( 06 Jun 2023 09:30 )  PTT:27.8 sec    Clean Catch Clean Catch (Midstream)   <10,000 CFU/mL Normal Urogenital Radha -- 06-04 @ 12:00  .Blood Blood-Peripheral   Growth in aerobic and anaerobic bottles: Escherichia coli  ***Blood Panel PCR results on this specimen are available  approximately 3 hours after the Gram stain result.***  Gram stain, PCR, and/or culture results may not always  correspond due to difference in methodologies.  ************************************************************  This PCR assay was performed by multiplex PCR. This  Assay tests for 66 bacterial and resistance gene targets.  Please refer to the [facility] Labs test directory  at [email or web address] for details.   Growth in aerobic bottle: Gram Negative Rods  Growth in anaerobic bottle: Gram Negative Rods 06-04 @ 01:52              Radiology:  CXR:   1. Right-sided central line terminates in the SVC without pneumothorax.  2. Prominent left ventricle with engorgement of central pulmonary veins but without pulmonary edema.    Bedside Ultrasound: N/A    Tubes/Lines: R IJ catheter      GLOBAL ISSUE/BEST PRACTICE:  Analgesia: Y  Sedation: N  HOB elevation: Y  Stress ulcer prophylaxis: Y  VTE prophylaxis: Y  Glycemic control: Y   Nutrition: Y    CODE STATUS:  Full code

## 2023-06-06 NOTE — DIETITIAN INITIAL EVALUATION ADULT - PERTINENT LABORATORY DATA
06-06    130<L>  |  95<L>  |  126<H>  ----------------------------<  171<H>  5.0   |  25  |  5.40<H>    Ca    8.5      06 Jun 2023 06:05  Phos  5.3     06-06  Mg     2.4     06-06    TPro  6.7  /  Alb  2.2<L>  /  TBili  0.4  /  DBili  x   /  AST  15  /  ALT  15  /  AlkPhos  105  06-06  POCT Blood Glucose.: 157 mg/dL (06-06-23 @ 08:20)  A1C with Estimated Average Glucose Result: 8.2 % (06-05-23 @ 06:10)

## 2023-06-06 NOTE — PROGRESS NOTE ADULT - SUBJECTIVE AND OBJECTIVE BOX
EVELYN LOPEZ is a 72yFemale , patient examined and chart reviewed.    INTERVAL HPI/ OVERNIGHT EVENTS:   Afebrile. Upset about going on HD.    PAST MEDICAL & SURGICAL HISTORY:  Hypertension  Diabetes  Lymphedema  H/O left bundle branch block  History of left bundle branch block (LBBB)  Systolic heart failure, chronic  H/O cataract    History of surgical removal of meniscus of knee  left in   Frozen shoulder    H/O Achilles tendon repair  lengthened bilaterally,   Fractured skull        For details regarding the patient's social history, family history, and other miscellaneous elements, please refer the initial infectious diseases consultation and/or the admitting history and physical examination for this admission.     ROS:  CONSTITUTIONAL:  Negative fever or chills  EYES:  Negative  blurry vision or double vision  CARDIOVASCULAR:  Negative for chest pain or palpitations  RESPIRATORY:  Negative for cough, wheezing, or SOB   GASTROINTESTINAL:  Negative for nausea vomiting, diarrhea, constipation, or abdominal pain   GENITOURINARY:  Negative frequency, urgency or dysuria  NEUROLOGIC:  No headache, confusion, dizziness, lightheadedness  All other systems were reviewed and are negative     ALLERGIES  penicillins (Hives)      Current inpatient medications :    ANTIBIOTICS/RELEVANT:  cefTRIAXone   IVPB 2000 milliGRAM(s) IV Intermittent every 24 hours      MEDICATIONS  (STANDING):  albumin human  5% IVPB 250 milliLiter(s) IV Intermittent once  aspirin  chewable 81 milliGRAM(s) Oral daily  atorvastatin 80 milliGRAM(s) Oral at bedtime  chlorhexidine 2% Cloths 1 Application(s) Topical <User Schedule>  dextrose 50% Injectable 50 milliLiter(s) IV Push every 15 minutes  dextrose 50% Injectable 25 milliLiter(s) IV Push every 15 minutes  heparin   Injectable 7500 Unit(s) SubCutaneous every 8 hours  insulin glargine Injectable (LANTUS) 25 Unit(s) SubCutaneous every morning  insulin lispro (ADMELOG) corrective regimen sliding scale   SubCutaneous every 6 hours  levothyroxine 75 MICROGram(s) Oral daily  midodrine      midodrine 5 milliGRAM(s) Oral every 8 hours  mupirocin 2% Nasal 1 Application(s) Both Nostrils two times a day  nystatin Powder 1 Application(s) Topical two times a day  pantoprazole  Injectable 40 milliGRAM(s) IV Push daily    MEDICATIONS  (PRN):  sodium chloride 0.9% lock flush 10 milliLiter(s) IV Push every 1 hour PRN Pre/post blood products, medications, blood draw, and to maintain line patency  trimethobenzamide Injectable 200 milliGRAM(s) IntraMuscular every 8 hours PRN Nausea and/or Vomiting        Objective:  Vital Signs Last 24 Hrs  T(C): 36.5 (2023 17:48), Max: 36.9 (2023 00:10)  T(F): 97.7 (2023 17:48), Max: 98.4 (2023 00:10)  HR: 79 (2023 20:00) (59 - 79)  BP: 119/57 (2023 20:00) (87/43 - 138/56)  BP(mean): 82 (2023 20:00) (59 - 87)  RR: 19 (2023 20:00) (13 - 23)  SpO2: 96% (2023 20:00) (91% - 100%)    Parameters below as of 2023 17:48  Patient On (Oxygen Delivery Method): room air      Physical Exam:  General: no acute distress  Neck: supple, trachea midline  Lungs: clear, no wheeze/rhonchi  Cardiovascular: regular rate and rhythm, S1 S2  Abdomen: soft, nontender,  bowel sounds normal  Neurological: alert and oriented x3  Skin: no rash  Extremities: + edema      LABS:                        11.4   17.18 )-----------( 170      ( 2023 06:05 )             34.2   06-06    130<L>  |  95<L>  |  126<H>  ----------------------------<  171<H>  5.0   |  25  |  5.40<H>    Ca    8.5      2023 06:05  Phos  5.3     06-06  Mg     2.4     06-06    TPro  6.7  /  Alb  2.2<L>  /  TBili  0.4  /  DBili  x   /  AST  15  /  ALT  15  /  AlkPhos  105  06-06      Urinalysis Basic - ( 2023 12:00 )    Color: Dark Yellow / Appearance: Turbid / S.016 / pH: x  Gluc: x / Ketone: Trace mg/dL  / Bili: Negative / Urobili: 1.0 mg/dL   Blood: x / Protein: 100 mg/dL / Nitrite: Negative   Leuk Esterase: Large / RBC: 6 /HPF / WBC Too Numerous to count /HPF   Sq Epi: x / Non Sq Epi: x / Bacteria: Moderate /HPF    MICROBIOLOGY:    Culture - Urine (collected 2023 12:00)  Source: Clean Catch Clean Catch (Midstream)  Final Report (2023 15:32):    <10,000 CFU/mL Normal Urogenital Radha    Culture - Blood (collected 2023 01:52)  Source: .Blood Blood-Peripheral  Gram Stain (2023 22:53):    Growth in aerobic bottle: Gram Negative Rods    Growth in anaerobic bottle: Gram Negative Rods  Final Report (2023 16:36):    Growth in aerobic and anaerobic bottles: Escherichia coli    See previous culture [id number]    Culture - Blood (collected 2023 01:52)  Source: .Blood Blood-Peripheral  Gram Stain (2023 21:29):    Growth in aerobic bottle: Gram Negative Rods    Growth in anaerobic bottle: Gram Negative Rods  Final Report (2023 15:58):    Growth in aerobic and anaerobic bottles: Escherichia coli    ***Blood Panel PCR results on this specimen are available    approximately 3 hours after the Gram stain result.***    Gram stain, PCR, and/or culture results may not always    correspond due to difference in methodologies.    ************************************************************    This PCR assay was performed by multiplex PCR. This    Assay tests for 66 bacterial and resistance gene targets.    Please refer to the [facility] test directory    at [email or web address] for details.  Organism: Blood Culture PCR  Escherichia coli (2023 15:58)  Organism: Escherichia coli (2023 15:58)      Method Type: CATRACHITA      -  Amikacin: S <=16      -  Ampicillin: S <=8 These ampicillin results predict results for amoxicillin      -  Ampicillin/Sulbactam: S <=4/2 Enterobacter, Klebsiella aerogenes, Citrobacter, and Serratia may develop resistance during prolonged therapy (3-4 days)      -  Aztreonam: S <=4      -  Cefazolin: S <=2 Enterobacter, Klebsiella aerogenes, Citrobacter, and Serratia may develop resistance during prolonged therapy (3-4 days)      -  Cefepime: S <=2      -  Cefoxitin: S <=8      -  Ceftriaxone: S <=1 Enterobacter, Klebsiella aerogenes, Citrobacter, and Serratia may develop resistance during prolonged therapy      -  Ciprofloxacin: S <=0.25      -  Ertapenem: S <=0.5      -  Gentamicin: S <=2      -  Imipenem: S <=1      -  Levofloxacin: S <=0.5      -  Meropenem: S <=1      -  Piperacillin/Tazobactam: S <=8      -  Tobramycin: S <=2      -  Trimethoprim/Sulfamethoxazole: S <=0.5/9.5  Organism: Blood Culture PCR (2023 15:58)      Method Type: PCR      -  Escherichia coli: Detec    RADIOLOGY & ADDITIONAL STUDIES:    ACC: [id number] EXAM:  CT ABDOMEN AND PELVIS OC   ORDERED BY:  PEDRO LUIS MARTIN     PROCEDURE DATE:  2023          INTERPRETATION:  CLINICAL INFORMATION: Vomiting and abdominal pain.    COMPARISON: 2023 CT abdomen pelvis    CONTRAST/COMPLICATIONS:  IV Contrast: NONE  Oral Contrast: Omnipaque 300  Complications: None reported at time of study completion    PROCEDURE:  CT of the Abdomen and Pelvis was performed.  Sagittal and coronal reformats were performed.    FINDINGS:  LOWER CHEST: Within normal limits.    LIVER: Within normal limits.  BILE DUCTS: Normal caliber.  GALLBLADDER: Within normal limits.  SPLEEN: Within normal limits.  PANCREAS: Within normal limits.  ADRENALS: Within normal limits.  KIDNEYS/URETERS: Mild left hydronephrosis but with no stone or other   obstructing lesion identified. Suggestion of left renal pelvis and   ureteral wall thickening not well evaluated without IV contrast. Kidneys   otherwise similar to prior with mild bilateral perinephric stranding. No   concerning renal mass.    BLADDER: Nondilated. No stones or filling defects.  REPRODUCTIVE ORGANS: Small calcification in the uterus. No concerning   findings.    BOWEL: No bowel obstruction. Appendix is normal.  PERITONEUM: No ascites.  VESSELS:No abdominal aortic aneurysm. Atherosclerosis similar to prior.  RETROPERITONEUM/LYMPH NODES: No lymphadenopathy.  ABDOMINAL WALL: Within normal limits.  BONES: No acute findings. Bilateral L5 spondylolysis with mild   degenerative anterolisthesis ofL5 on S1. Prominent degenerative changes   lower lumbar spine.    IMPRESSION:  Mild left hydronephrosis is new. Left ureter not dilated. Suggestion of   left renal pelvis wall thickening not well evaluated without IV contrast.   Urinary tract infection could appear this way though not definitive.   Early or mild proximal left ureteral obstruction also possible though no   stone or other obstructing lesion is evident.    No other significant change. No bowel obstruction.    Assessment :   73yo F with PMHx IDDM2, HFrEF, HTN, hypothyroidism, LBBB, chronic lymphedema admitted with septic shock and Ecoli bacteremia sec Left pyelo with hydro- no obtructive uropathy on CT    Off pressors  Worsening DUNCAN     Plan :   Cont Rocephin  Fu repeat blood cultures  To start HD due to worsening DUNCNA  Trend temps and cbc  Pulm toileting  Increase activity/OOB to chair    Continue with present regiment.  Appropriate use of antibiotics and adverse effects reviewed.      > 35 minutes were spent in direct patient care reviewing notes, medications ,labs data/ imaging , discussion with multidisciplinary team.    Thank you for allowing me to participate in care of your patient .    Robby Clark MD  Infectious Disease  [phone]

## 2023-06-06 NOTE — PROGRESS NOTE ADULT - ASSESSMENT
Acute on CKD Stage 4  Sepsis/UTI  L Hydronephrosis  Uremia      -DUNCAN from sepsis/hypotension and renal hypoperfusion; unilateral mild hydro should not cause this degree of DUNCAN  -Check urine indices  -Abx, renal dosing  -Urology eval noted  -Monitor chemistries and urine output  -Renal indices continue to worsen despite having some urine output; uremic as well  -Consented for dialysis  -Discussed with patient risks and benefits of dialysis in depth up to and including death, she agrees to dialysis if necessary  -Initiate dialysis today 6/6/23; ICU to place access    D/W ICU     Thank you

## 2023-06-06 NOTE — DIETITIAN INITIAL EVALUATION ADULT - PERTINENT MEDS FT
MEDICATIONS  (STANDING):  albumin human 25% IVPB 100 milliLiter(s) IV Intermittent once  aspirin  chewable 81 milliGRAM(s) Oral daily  atorvastatin 80 milliGRAM(s) Oral at bedtime  cefTRIAXone   IVPB 2000 milliGRAM(s) IV Intermittent every 24 hours  dextrose 50% Injectable 50 milliLiter(s) IV Push every 15 minutes  dextrose 50% Injectable 25 milliLiter(s) IV Push every 15 minutes  heparin   Injectable 7500 Unit(s) SubCutaneous every 8 hours  insulin glargine Injectable (LANTUS) 25 Unit(s) SubCutaneous every morning  insulin lispro (ADMELOG) corrective regimen sliding scale   SubCutaneous every 4 hours  levothyroxine 75 MICROGram(s) Oral daily  nystatin Powder 1 Application(s) Topical two times a day  pantoprazole  Injectable 40 milliGRAM(s) IV Push daily    MEDICATIONS  (PRN):  trimethobenzamide Injectable 200 milliGRAM(s) IntraMuscular every 8 hours PRN Nausea and/or Vomiting

## 2023-06-06 NOTE — DIETITIAN INITIAL EVALUATION ADULT - OTHER INFO
73yo F with PMHx IDDM2, HFrEF, HTN, hypothyroidism, LBBB, chronic lymphedema presenting with decreased appetite and multiple episodes of emesis. Recent pansensitive E.coli UTI in April treated with Bactrim. Now with L hydronephrosis noted on CT. Admitted to ICU for septic shock 2/2 UTI. 71yo F with PMHx IDDM2, HFrEF, HTN, hypothyroidism, LBBB, chronic lymphedema presenting with decreased appetite and multiple episodes of emesis. Recent pansensitive E.coli UTI in April treated with Bactrim. Now with L hydronephrosis noted on CT. Admitted to ICU for septic shock 2/2 UTI., now off pressors. + acute on CKD stage 4 which likely requires HD and to be initiated today 6/6 after access obtained  Pt tells undersigned RD during bedside visit that she is 5'11", weighs about 310# denies any recent sign wt change, taking small sips of water

## 2023-06-06 NOTE — DIETITIAN INITIAL EVALUATION ADULT - DIET TYPE
low sodium/no concentrated potassium/no concentrated phosphorus/1500ml/consistent carbohydrate (evening snack)

## 2023-06-06 NOTE — DIETITIAN INITIAL EVALUATION ADULT - ENERGY INTAKE
( nephrology note today 6/6) Acute on CKD Stage 4  Sepsis/UTI  L Hydronephrosis  Uremia      -DUNCAN from sepsis/hypotension and renal hypoperfusion; unilateral mild hydro should not cause this degree of DUNCAN  -Check urine indices  -Abx, renal dosing  -Urology eval noted  -Monitor chemistries and urine output  -Renal indices continue to worsen despite having some urine output; uremic as well  -Consented for dialysis  -Discussed with patient risks and benefits of dialysis in depth up to and including death, she agrees to dialysis if necessary  -Initiate dialysis today 6/6/23; ICU to place access     NPO

## 2023-06-06 NOTE — CARE COORDINATION ASSESSMENT. - REASON FOR CONSULT
SW met with patient at bedside in order to conduct assessment and to discuss SW roles with good understanding./coordination of care/psychosocial issues

## 2023-06-06 NOTE — CARE COORDINATION ASSESSMENT. - NSPASTMEDSURGHISTORY_GEN_ALL_CORE_FT
PAST MEDICAL & SURGICAL HISTORY:  Lymphedema      Diabetes      Hypertension      Fractured skull  1968      H/O Achilles tendon repair  lengthened bilaterally, 2000      Frozen shoulder  2000      History of surgical removal of meniscus of knee  left in 1971      H/O cataract  2020      History of left bundle branch block (LBBB)      H/O left bundle branch block      Systolic heart failure, chronic

## 2023-06-06 NOTE — PROGRESS NOTE ADULT - SUBJECTIVE AND OBJECTIVE BOX
Date of Service 06-06-23 @ 12:57    Patient is a 72y old  Female who presents with a chief complaint of Sepsis     (06 Jun 2023 09:04)      INTERVAL /OVERNIGHT EVENTS: feels nauseous    MEDICATIONS  (STANDING):  albumin human 25% IVPB 100 milliLiter(s) IV Intermittent once  aspirin  chewable 81 milliGRAM(s) Oral daily  atorvastatin 80 milliGRAM(s) Oral at bedtime  cefTRIAXone   IVPB 2000 milliGRAM(s) IV Intermittent every 24 hours  dextrose 50% Injectable 50 milliLiter(s) IV Push every 15 minutes  dextrose 50% Injectable 25 milliLiter(s) IV Push every 15 minutes  heparin   Injectable 7500 Unit(s) SubCutaneous every 8 hours  insulin glargine Injectable (LANTUS) 25 Unit(s) SubCutaneous every morning  insulin lispro (ADMELOG) corrective regimen sliding scale   SubCutaneous every 4 hours  levothyroxine 75 MICROGram(s) Oral daily  nystatin Powder 1 Application(s) Topical two times a day  pantoprazole  Injectable 40 milliGRAM(s) IV Push daily    MEDICATIONS  (PRN):  trimethobenzamide Injectable 200 milliGRAM(s) IntraMuscular every 8 hours PRN Nausea and/or Vomiting      Allergies    penicillins (Hives)    Intolerances        REVIEW OF SYSTEMS:  CONSTITUTIONAL: No fever, weight loss, or fatigue  EYES: No eye pain, visual disturbances, or discharge  ENMT:  No difficulty hearing, tinnitus, vertigo; No sinus or throat pain  NECK: No pain or stiffness  RESPIRATORY: No cough, wheezing, chills or hemoptysis; No shortness of breath  CARDIOVASCULAR: No chest pain, palpitations, dizziness, or leg swelling  GASTROINTESTINAL: No abdominal or epigastric pain. + nausea, vomiting, or hematemesis; No diarrhea or constipation. No melena or hematochezia.  GENITOURINARY: No dysuria, frequency, hematuria, or incontinence  NEUROLOGICAL: No headaches, memory loss, loss of strength, numbness, or tremors  SKIN: No itching, burning, rashes, or lesions   LYMPH NODES: No enlarged glands  ENDOCRINE: No heat or cold intolerance; No hair loss; No polydipsia or polyuria  MUSCULOSKELETAL: No joint pain or swelling; No muscle, back, or extremity pain  PSYCHIATRIC: No depression, anxiety, mood swings, or difficulty sleeping  HEME/LYMPH: No easy bruising, or bleeding gums  ALLERGY AND IMMUNOLOGIC: No hives or eczema    Vital Signs Last 24 Hrs  T(C): 36.7 (06 Jun 2023 11:55), Max: 36.9 (06 Jun 2023 00:10)  T(F): 98.1 (06 Jun 2023 11:55), Max: 98.4 (06 Jun 2023 00:10)  HR: 69 (06 Jun 2023 12:30) (55 - 77)  BP: 108/54 (06 Jun 2023 12:30) (87/43 - 145/62)  BP(mean): 77 (06 Jun 2023 12:30) (59 - 92)  RR: 17 (06 Jun 2023 12:30) (13 - 24)  SpO2: 95% (06 Jun 2023 12:30) (91% - 100%)    Parameters below as of 06 Jun 2023 07:00  Patient On (Oxygen Delivery Method): room air        PHYSICAL EXAM:  GENERAL: NAD, well-groomed, well-developed  HEAD:  Atraumatic, Normocephalic  EYES: EOMI, PERRLA, conjunctiva and sclera clear  ENMT: No tonsillar erythema, exudates, or enlargement; Moist mucous membranes, Good dentition, No lesions  NECK: Supple, No JVD, Normal thyroid  NERVOUS SYSTEM:  Alert & Oriented X3, Good concentration; Motor Strength 5/5 B/L upper and lower extremities; DTRs 2+ intact and symmetric  CHEST/LUNG: Clear to auscultation bilaterally; No rales, rhonchi, wheezing, or rubs  HEART: Regular rate and rhythm; No murmurs, rubs, or gallops  ABDOMEN: Soft, Nontender, Nondistended; Bowel sounds present  EXTREMITIES:  2+ Peripheral Pulses, No clubbing, cyanosis, or edema  LYMPH: No lymphadenopathy noted  SKIN: No rashes or lesions    LABS:                        11.4   17.18 )-----------( 170      ( 06 Jun 2023 06:05 )             34.2     06 Jun 2023 06:05    130    |  95     |  126    ----------------------------<  171    5.0     |  25     |  5.40     Ca    8.5        06 Jun 2023 06:05  Phos  5.3       06 Jun 2023 06:05  Mg     2.4       06 Jun 2023 06:05    TPro  6.7    /  Alb  2.2    /  TBili  0.4    /  DBili  x      /  AST  15     /  ALT  15     /  AlkPhos  105    06 Jun 2023 06:05    PT/INR - ( 06 Jun 2023 09:30 )   PT: 12.8 sec;   INR: 1.09 ratio         PTT - ( 06 Jun 2023 09:30 )  PTT:27.8 sec    CAPILLARY BLOOD GLUCOSE      POCT Blood Glucose.: 186 mg/dL (06 Jun 2023 09:59)  POCT Blood Glucose.: 157 mg/dL (06 Jun 2023 08:20)  POCT Blood Glucose.: 177 mg/dL (06 Jun 2023 05:12)  POCT Blood Glucose.: 170 mg/dL (06 Jun 2023 02:06)  POCT Blood Glucose.: 217 mg/dL (05 Jun 2023 21:24)  POCT Blood Glucose.: 195 mg/dL (05 Jun 2023 19:21)  POCT Blood Glucose.: 233 mg/dL (05 Jun 2023 14:58)  POCT Blood Glucose.: 234 mg/dL (05 Jun 2023 13:30)      RADIOLOGY & ADDITIONAL TESTS:    Notes Reviewed:  [x ] YES  [ ] NO    Care Discussed with Consultants/Other Providers [x ] YES  [ ] NO

## 2023-06-06 NOTE — PROCEDURE NOTE - ADDITIONAL PROCEDURE DETAILS
Patient is a 71 yo female admitted with   - Septic shock  - DUNCAN  - UTI / pyelonephritis     Requiring [facility] for iHD.

## 2023-06-07 LAB
ALBUMIN SERPL ELPH-MCNC: 2.1 G/DL — LOW (ref 3.3–5)
ALP SERPL-CCNC: 78 U/L — SIGNIFICANT CHANGE UP (ref 40–120)
ALT FLD-CCNC: 13 U/L — SIGNIFICANT CHANGE UP (ref 12–78)
ANION GAP SERPL CALC-SCNC: 9 MMOL/L — SIGNIFICANT CHANGE UP (ref 5–17)
AST SERPL-CCNC: 10 U/L — LOW (ref 15–37)
BASOPHILS # BLD AUTO: 0.05 K/UL — SIGNIFICANT CHANGE UP (ref 0–0.2)
BASOPHILS NFR BLD AUTO: 0.3 % — SIGNIFICANT CHANGE UP (ref 0–2)
BILIRUB SERPL-MCNC: 0.3 MG/DL — SIGNIFICANT CHANGE UP (ref 0.2–1.2)
BUN SERPL-MCNC: 118 MG/DL — HIGH (ref 7–23)
CALCIUM SERPL-MCNC: 8.1 MG/DL — LOW (ref 8.5–10.1)
CHLORIDE SERPL-SCNC: 101 MMOL/L — SIGNIFICANT CHANGE UP (ref 96–108)
CO2 SERPL-SCNC: 25 MMOL/L — SIGNIFICANT CHANGE UP (ref 22–31)
CREAT SERPL-MCNC: 5 MG/DL — HIGH (ref 0.5–1.3)
EGFR: 9 ML/MIN/1.73M2 — LOW
EOSINOPHIL # BLD AUTO: 0.68 K/UL — HIGH (ref 0–0.5)
EOSINOPHIL NFR BLD AUTO: 4 % — SIGNIFICANT CHANGE UP (ref 0–6)
GLUCOSE SERPL-MCNC: 164 MG/DL — HIGH (ref 70–99)
HCT VFR BLD CALC: 31 % — LOW (ref 34.5–45)
HGB BLD-MCNC: 10.4 G/DL — LOW (ref 11.5–15.5)
IMM GRANULOCYTES NFR BLD AUTO: 0.8 % — SIGNIFICANT CHANGE UP (ref 0–0.9)
LYMPHOCYTES # BLD AUTO: 0.62 K/UL — LOW (ref 1–3.3)
LYMPHOCYTES # BLD AUTO: 3.7 % — LOW (ref 13–44)
MAGNESIUM SERPL-MCNC: 2.6 MG/DL — SIGNIFICANT CHANGE UP (ref 1.6–2.6)
MCHC RBC-ENTMCNC: 29.4 PG — SIGNIFICANT CHANGE UP (ref 27–34)
MCHC RBC-ENTMCNC: 33.5 GM/DL — SIGNIFICANT CHANGE UP (ref 32–36)
MCV RBC AUTO: 87.6 FL — SIGNIFICANT CHANGE UP (ref 80–100)
MONOCYTES # BLD AUTO: 2.11 K/UL — HIGH (ref 0–0.9)
MONOCYTES NFR BLD AUTO: 12.5 % — SIGNIFICANT CHANGE UP (ref 2–14)
NEUTROPHILS # BLD AUTO: 13.22 K/UL — HIGH (ref 1.8–7.4)
NEUTROPHILS NFR BLD AUTO: 78.7 % — HIGH (ref 43–77)
NRBC # BLD: 0 /100 WBCS — SIGNIFICANT CHANGE UP (ref 0–0)
PHOSPHATE SERPL-MCNC: 5 MG/DL — HIGH (ref 2.5–4.5)
PLATELET # BLD AUTO: 150 K/UL — SIGNIFICANT CHANGE UP (ref 150–400)
POTASSIUM SERPL-MCNC: 4.4 MMOL/L — SIGNIFICANT CHANGE UP (ref 3.5–5.3)
POTASSIUM SERPL-SCNC: 4.4 MMOL/L — SIGNIFICANT CHANGE UP (ref 3.5–5.3)
PROT SERPL-MCNC: 5.9 G/DL — LOW (ref 6–8.3)
RBC # BLD: 3.54 M/UL — LOW (ref 3.8–5.2)
RBC # FLD: 13.6 % — SIGNIFICANT CHANGE UP (ref 10.3–14.5)
SODIUM SERPL-SCNC: 135 MMOL/L — SIGNIFICANT CHANGE UP (ref 135–145)
WBC # BLD: 16.82 K/UL — HIGH (ref 3.8–10.5)
WBC # FLD AUTO: 16.82 K/UL — HIGH (ref 3.8–10.5)

## 2023-06-07 PROCEDURE — 93306 TTE W/DOPPLER COMPLETE: CPT | Mod: 26

## 2023-06-07 PROCEDURE — 99233 SBSQ HOSP IP/OBS HIGH 50: CPT | Mod: GC

## 2023-06-07 RX ORDER — ALBUMIN HUMAN 25 %
250 VIAL (ML) INTRAVENOUS ONCE
Refills: 0 | Status: DISCONTINUED | OUTPATIENT
Start: 2023-06-07 | End: 2023-06-07

## 2023-06-07 RX ORDER — ALBUMIN HUMAN 25 %
50 VIAL (ML) INTRAVENOUS ONCE
Refills: 0 | Status: COMPLETED | OUTPATIENT
Start: 2023-06-07 | End: 2023-06-07

## 2023-06-07 RX ADMIN — CHLORHEXIDINE GLUCONATE 1 APPLICATION(S): 213 SOLUTION TOPICAL at 05:46

## 2023-06-07 RX ADMIN — HEPARIN SODIUM 7500 UNIT(S): 5000 INJECTION INTRAVENOUS; SUBCUTANEOUS at 05:42

## 2023-06-07 RX ADMIN — Medication 4: at 23:51

## 2023-06-07 RX ADMIN — INSULIN GLARGINE 25 UNIT(S): 100 INJECTION, SOLUTION SUBCUTANEOUS at 07:53

## 2023-06-07 RX ADMIN — ATORVASTATIN CALCIUM 80 MILLIGRAM(S): 80 TABLET, FILM COATED ORAL at 21:04

## 2023-06-07 RX ADMIN — NYSTATIN CREAM 1 APPLICATION(S): 100000 CREAM TOPICAL at 17:18

## 2023-06-07 RX ADMIN — MIDODRINE HYDROCHLORIDE 5 MILLIGRAM(S): 2.5 TABLET ORAL at 14:06

## 2023-06-07 RX ADMIN — Medication 75 MICROGRAM(S): at 05:44

## 2023-06-07 RX ADMIN — MIDODRINE HYDROCHLORIDE 5 MILLIGRAM(S): 2.5 TABLET ORAL at 21:04

## 2023-06-07 RX ADMIN — Medication 2: at 05:45

## 2023-06-07 RX ADMIN — MUPIROCIN 1 APPLICATION(S): 20 OINTMENT TOPICAL at 17:18

## 2023-06-07 RX ADMIN — PANTOPRAZOLE SODIUM 40 MILLIGRAM(S): 20 TABLET, DELAYED RELEASE ORAL at 11:48

## 2023-06-07 RX ADMIN — NYSTATIN CREAM 1 APPLICATION(S): 100000 CREAM TOPICAL at 05:42

## 2023-06-07 RX ADMIN — HEPARIN SODIUM 7500 UNIT(S): 5000 INJECTION INTRAVENOUS; SUBCUTANEOUS at 14:06

## 2023-06-07 RX ADMIN — Medication 50 MILLILITER(S): at 09:30

## 2023-06-07 RX ADMIN — MIDODRINE HYDROCHLORIDE 5 MILLIGRAM(S): 2.5 TABLET ORAL at 05:44

## 2023-06-07 RX ADMIN — Medication 81 MILLIGRAM(S): at 11:48

## 2023-06-07 RX ADMIN — HEPARIN SODIUM 7500 UNIT(S): 5000 INJECTION INTRAVENOUS; SUBCUTANEOUS at 21:04

## 2023-06-07 RX ADMIN — CEFTRIAXONE 100 MILLIGRAM(S): 500 INJECTION, POWDER, FOR SOLUTION INTRAMUSCULAR; INTRAVENOUS at 11:48

## 2023-06-07 RX ADMIN — MUPIROCIN 1 APPLICATION(S): 20 OINTMENT TOPICAL at 05:43

## 2023-06-07 RX ADMIN — Medication 4: at 17:19

## 2023-06-07 NOTE — PROGRESS NOTE ADULT - SUBJECTIVE AND OBJECTIVE BOX
Patient is a 72y old  Female who presents with a chief complaint of sepsis (04 Jun 2023 10:59)       HPI: female with history of insulin-dependent diabetes, CHF, hypertension who presented to the ED with nausea and vomiting for several days.  She has experienced no significant abdominal pain and history of multiple dark emesis and 1 episode of diarrhea today.  She denies fever or chills.  Denies URI symptoms.  She denies significant change in her urinary patters. CT performed on admission demonstrated mild left hydronephrosis without ureteral dilatation or obstructing calculus. Found to have DUNCAN and hypotension. Renal consulted for further eval. Has not seen a Nephrologist outpatient. Currently asymptomatic.      No acute events noted overnight    PAST MEDICAL & SURGICAL HISTORY:  Hypertension      Diabetes      Lymphedema      H/O left bundle branch block      History of left bundle branch block (LBBB)      Systolic heart failure, chronic      H/O cataract  2020      History of surgical removal of meniscus of knee  left in 1971      Frozen shoulder  2000      H/O Achilles tendon repair  lengthened bilaterally, 2000      Fractured skull  1968         MEDICATIONS  (STANDING):  aspirin  chewable 81 milliGRAM(s) Oral daily  atorvastatin 80 milliGRAM(s) Oral at bedtime  cefTRIAXone   IVPB 2000 milliGRAM(s) IV Intermittent every 24 hours  chlorhexidine 2% Cloths 1 Application(s) Topical <User Schedule>  dextrose 50% Injectable 50 milliLiter(s) IV Push every 15 minutes  dextrose 50% Injectable 25 milliLiter(s) IV Push every 15 minutes  heparin   Injectable 7500 Unit(s) SubCutaneous every 8 hours  insulin glargine Injectable (LANTUS) 25 Unit(s) SubCutaneous every morning  insulin lispro (ADMELOG) corrective regimen sliding scale   SubCutaneous every 6 hours  levothyroxine 75 MICROGram(s) Oral daily  midodrine      midodrine 5 milliGRAM(s) Oral every 8 hours  mupirocin 2% Nasal 1 Application(s) Both Nostrils two times a day  nystatin Powder 1 Application(s) Topical two times a day  pantoprazole  Injectable 40 milliGRAM(s) IV Push daily    MEDICATIONS  (PRN):  sodium chloride 0.9% lock flush 10 milliLiter(s) IV Push every 1 hour PRN Pre/post blood products, medications, blood draw, and to maintain line patency  trimethobenzamide Injectable 200 milliGRAM(s) IntraMuscular every 8 hours PRN Nausea and/or Vomiting    FAMILY HISTORY:  Family history of CVA  mom, age 57    [place]    Social History:Non smoker        Allergies    penicillins (Hives)    Intolerances         REVIEW OF SYSTEMS:    Constitutional: Denies fatigue  HEENT: Denies headaches and dizziness  Respiratory: denies SOB, cough, or wheezing  Cardiovascular: denies CP, palpitations  Gastrointestinal: + N/V - improving  Genitourinary: denies painful urination, increased frequency, urgency, or bloody urine  Skin: denies rashes or itching  Musculoskeletal: denies muscle aches, joint swelling  Neurologic: Denies generalized weakness, denies loss of sensation, numbness, or tingling      ICU Vital Signs Last 24 Hrs  T(C): 36.5 (06 Jun 2023 04:15), Max: 36.9 (06 Jun 2023 00:10)  T(F): 97.7 (06 Jun 2023 04:15), Max: 98.4 (06 Jun 2023 00:10)  HR: 66 (06 Jun 2023 08:00) (55 - 66)  BP: 100/60 (06 Jun 2023 08:00) (87/43 - 145/62)  BP(mean): 76 (06 Jun 2023 08:00) (62 - 92)  ABP: --  ABP(mean): --  RR: 13 (06 Jun 2023 08:00) (13 - 24)  SpO2: 92% (06 Jun 2023 08:00) (92% - 100%)    O2 Parameters below as of 06 Jun 2023 07:00  Patient On (Oxygen Delivery Method): room air      PHYSICAL EXAM:    GENERAL: NAD  HEAD:  Atraumatic, Normocephalic  EYES: EOMI, conjunctiva and sclera clear  ENMT: No Drainage from nares, No drainage from ears  NECK: Supple, +dialysis catheter intact  NERVOUS SYSTEM:  Awake and Alert  CHEST/LUNG: Clear to percussion bilaterally; No rales, rhonchi, wheezing, or rubs  HEART: Regular rate and rhythm; No murmurs, rubs, or gallops  ABDOMEN: Soft, Nontender, Nondistended; Bowel sounds present, obese  EXTREMITIES:  + Edema  SKIN: No rashes No obvious ecchymosis      LABS:                                              10.4   16.82 )-----------( 150      ( 07 Jun 2023 05:30 )             31.0     06-07    135  |  101  |  118<H>  ----------------------------<  164<H>  4.4   |  25  |  5.00<H>    Ca    8.1<L>      07 Jun 2023 05:30  Phos  5.0     06-07  Mg     2.6     06-07    TPro  5.9<L>  /  Alb  2.1<L>  /  TBili  0.3  /  DBili  x   /  AST  10<L>  /  ALT  13  /  AlkPhos  78  06-07    PT/INR - ( 06 Jun 2023 09:30 )   PT: 12.8 sec;   INR: 1.09 ratio         PTT - ( 06 Jun 2023 09:30 )  PTT:27.8 sec

## 2023-06-07 NOTE — PROGRESS NOTE ADULT - ASSESSMENT
73yo F with PMHx IDDM2, HFrEF, HTN, hypothyroidism, LBBB, chronic lymphedema presenting with decreased appetite and multiple episodes of emesis. Recent pansensitive E.coli UTI in April treated with Bactrim. Now with L hydronephrosis noted on CT. Admitted to ICU for septic shock 2/2 UTI.    Neuro: awake and alert, no active issues  Cardio: Intermittent hypotension. Septic shock resolved. Midodrine 5mg TID. May titrate down once patient HDS and no longer requires dialysis. Continue to hold home entresto, eplerenone and metoprolol in setting of sepsis and ATN. May restart HF meds as volume status requires and BP allows. QTc 501, Avoid QTc prolonging meds.  Pulm: Saturating well on room air. No s/s volume overload. Supplemental O2 prn. May require when sleeping.   GI: Diet advanced to CLD. Tolerating well. Tigan prn. May use Compazine prn. Responded well to Ativan however, patient developed cutaneous allergic rxn.  Renal: Downtrending renal indicies s/p 20min of dialysis yesterday. Receivied full HD today s/p IVF and IV albumin. Nephro following. L hydronephrosis without obstructing stone noted on CT. Urology: medical management. Cont [place]za cath  ID: E. coli sepsis 2/2 UTI. Leukocytosis decreasing. Initial Blood and Urine cultures growing E. coli sensitive to Rocephin. Repeat blood cx NGTD (prelim). Continue Rocephin (penicillin allergy, has tolerated Rocephin in the past).   Endo: Continue Lantus 25u daily, ISS  Heme: VTE ppx heparin subq  Dispo: Full code

## 2023-06-07 NOTE — PROGRESS NOTE ADULT - ASSESSMENT
Acute on CKD Stage 4  Sepsis/UTI  L Hydronephrosis  Uremia      -DUNCAN from sepsis/hypotension and renal hypoperfusion; unilateral mild hydro should not cause this degree of DUNCAN  -Check urine indices  -Abx, renal dosing  -Urology eval noted  -Monitor chemistries and urine output  -Renal indices worsened with uremic symptoms leading to dialysis; still urinating, but still documented < 1L  -Consented for dialysis  -Discussed with patient risks and benefits of dialysis in depth up to and including death, she agrees to dialysis if necessary  -Initiated dialysis 6/6/23; ICU placed access; However, due to collapsing vasculature, dialysis was not able to be done effectively and had about 20 mins on the machine. Now s/p IV albumin and IVF boluses. Will reattempt HD today 6/7/23.     D/W ICU PA    Thank you

## 2023-06-07 NOTE — PROGRESS NOTE ADULT - SUBJECTIVE AND OBJECTIVE BOX
EVELYN LOPEZ is a 72yFemale , patient examined and chart reviewed.    INTERVAL HPI/ OVERNIGHT EVENTS:   Afebrile. Feeling better.  Started HD yesterday.    PAST MEDICAL & SURGICAL HISTORY:  Hypertension  Diabetes  Lymphedema  H/O left bundle branch block  History of left bundle branch block (LBBB)  Systolic heart failure, chronic  H/O cataract    History of surgical removal of meniscus of knee  left in   Frozen shoulder    H/O Achilles tendon repair  lengthened bilaterally,   Fractured skull        For details regarding the patient's social history, family history, and other miscellaneous elements, please refer the initial infectious diseases consultation and/or the admitting history and physical examination for this admission.     ROS:  CONSTITUTIONAL:  Negative fever or chills  EYES:  Negative  blurry vision or double vision  CARDIOVASCULAR:  Negative for chest pain or palpitations  RESPIRATORY:  Negative for cough, wheezing, or SOB   GASTROINTESTINAL:  Negative for nausea vomiting, diarrhea, constipation, or abdominal pain   GENITOURINARY:  Negative frequency, urgency or dysuria  NEUROLOGIC:  No headache, confusion, dizziness, lightheadedness  All other systems were reviewed and are negative     ALLERGIES  penicillins (Hives)      Current inpatient medications :    ANTIBIOTICS/RELEVANT:  cefTRIAXone   IVPB 2000 milliGRAM(s) IV Intermittent every 24 hours    MEDICATIONS  (STANDING):  aspirin  chewable 81 milliGRAM(s) Oral daily  atorvastatin 80 milliGRAM(s) Oral at bedtime  chlorhexidine 2% Cloths 1 Application(s) Topical <User Schedule>  dextrose 50% Injectable 50 milliLiter(s) IV Push every 15 minutes  dextrose 50% Injectable 25 milliLiter(s) IV Push every 15 minutes  heparin   Injectable 7500 Unit(s) SubCutaneous every 8 hours  insulin glargine Injectable (LANTUS) 25 Unit(s) SubCutaneous every morning  insulin lispro (ADMELOG) corrective regimen sliding scale   SubCutaneous every 6 hours  levothyroxine 75 MICROGram(s) Oral daily  midodrine      midodrine 5 milliGRAM(s) Oral every 8 hours  mupirocin 2% Nasal 1 Application(s) Both Nostrils two times a day  nystatin Powder 1 Application(s) Topical two times a day  pantoprazole  Injectable 40 milliGRAM(s) IV Push daily    MEDICATIONS  (PRN):  sodium chloride 0.9% lock flush 10 milliLiter(s) IV Push every 1 hour PRN Pre/post blood products, medications, blood draw, and to maintain line patency  trimethobenzamide Injectable 200 milliGRAM(s) IntraMuscular every 8 hours PRN Nausea and/or Vomiting    Objective:  Vital Signs Last 24 Hrs  T(C): 37.2 (2023 16:46), Max: 37.2 (2023 16:46)  T(F): 99 (2023 16:46), Max: 99 (2023 16:46)  HR: 101 (2023 15:00) (75 - 101)  BP: 97/59 (2023 15:00) (94/53 - 145/64)  BP(mean): 72 (2023 15:00) (69 - 96)  RR: 18 (2023 15:00) (15 - 22)  SpO2: 93% (2023 15:00) (92% - 100%)    Parameters below as of 2023 11:40  Patient On (Oxygen Delivery Method): room air      Physical Exam:  General: no acute distress  Neck: supple, trachea midline right IJ HD cath c/d/i  Lungs: clear, no wheeze/rhonchi  Cardiovascular: regular rate and rhythm, S1 S2  Abdomen: soft, nontender,  bowel sounds normal  Neurological: alert and oriented x3  Skin: no rash  Extremities: + edema      LABS:                        10.4   16.82 )-----------( 150      ( 2023 05:30 )             31.0   06-07    135  |  101  |  118<H>  ----------------------------<  164<H>  4.4   |  25  |  5.00<H>    Ca    8.1<L>      2023 05:30  Phos  5.0     06-07  Mg     2.6     06-07    TPro  5.9<L>  /  Alb  2.1<L>  /  TBili  0.3  /  DBili  x   /  AST  10<L>  /  ALT  13  /  AlkPhos  78  06-07    Urinalysis Basic - ( 2023 12:00 )    Color: Dark Yellow / Appearance: Turbid / S.016 / pH: x  Gluc: x / Ketone: Trace mg/dL  / Bili: Negative / Urobili: 1.0 mg/dL   Blood: x / Protein: 100 mg/dL / Nitrite: Negative   Leuk Esterase: Large / RBC: 6 /HPF / WBC Too Numerous to count /HPF   Sq Epi: x / Non Sq Epi: x / Bacteria: Moderate /HPF    MICROBIOLOGY:  Culture - Blood (collected 2023 06:05)  Source: .Blood Blood-Peripheral  Preliminary Report (2023 09:02):    No growth to date.    Culture - Blood (collected 2023 06:00)  Source: .Blood Blood-Peripheral  Preliminary Report (2023 09:02):    No growth to date.    Culture - Urine (collected 2023 12:00)  Source: Clean Catch Clean Catch (Midstream)  Final Report (2023 15:32):    <10,000 CFU/mL Normal Urogenital Radha    Culture - Blood (collected 2023 01:52)  Source: .Blood Blood-Peripheral  Gram Stain (2023 22:53):    Growth in aerobic bottle: Gram Negative Rods    Growth in anaerobic bottle: Gram Negative Rods  Final Report (2023 16:36):    Growth in aerobic and anaerobic bottles: Escherichia coli    See previous culture [id number]    Culture - Blood (collected 2023 01:52)  Source: .Blood Blood-Peripheral  Gram Stain (2023 21:29):    Growth in aerobic bottle: Gram Negative Rods    Growth in anaerobic bottle: Gram Negative Rods  Final Report (2023 15:58):    Growth in aerobic and anaerobic bottles: Escherichia coli    ***Blood Panel PCR results on this specimen are available    approximately 3 hours after the Gram stain result.***    Gram stain, PCR, and/or culture results may not always    correspond due to difference in methodologies.    ************************************************************    This PCR assay was performed by multiplex PCR. This    Assay tests for 66 bacterial and resistance gene targets.    Please refer to the [facility] Labs test directory    at [email or web address] for details.  Organism: Blood Culture PCR  Escherichia coli (2023 15:58)  Organism: Escherichia coli (2023 15:58)      Method Type: CATRACHITA      -  Amikacin: S <=16      -  Ampicillin: S <=8 These ampicillin results predict results for amoxicillin      -  Ampicillin/Sulbactam: S <=4/2 Enterobacter, Klebsiella aerogenes, Citrobacter, and Serratia may develop resistance during prolonged therapy (3-4 days)      -  Aztreonam: S <=4      -  Cefazolin: S <=2 Enterobacter, Klebsiella aerogenes, Citrobacter, and Serratia may develop resistance during prolonged therapy (3-4 days)      -  Cefepime: S <=2      -  Cefoxitin: S <=8      -  Ceftriaxone: S <=1 Enterobacter, Klebsiella aerogenes, Citrobacter, and Serratia may develop resistance during prolonged therapy      -  Ciprofloxacin: S <=0.25      -  Ertapenem: S <=0.5      -  Gentamicin: S <=2      -  Imipenem: S <=1      -  Levofloxacin: S <=0.5      -  Meropenem: S <=1      -  Piperacillin/Tazobactam: S <=8      -  Tobramycin: S <=2      -  Trimethoprim/Sulfamethoxazole: S <=0.5/9.5  Organism: Blood Culture PCR (2023 15:58)      Method Type: PCR      -  Escherichia coli: Detec    RADIOLOGY & ADDITIONAL STUDIES:    ACC: [id number] EXAM:  CT ABDOMEN AND PELVIS OC   ORDERED BY:  PEDRO LUIS MARTIN     PROCEDURE DATE:  2023          INTERPRETATION:  CLINICAL INFORMATION: Vomiting and abdominal pain.    COMPARISON: 2023 CT abdomen pelvis    CONTRAST/COMPLICATIONS:  IV Contrast: NONE  Oral Contrast: Omnipaque 300  Complications: None reported at time of study completion    PROCEDURE:  CT of the Abdomen and Pelvis was performed.  Sagittal and coronal reformats were performed.    FINDINGS:  LOWER CHEST: Within normal limits.    LIVER: Within normal limits.  BILE DUCTS: Normal caliber.  GALLBLADDER: Within normal limits.  SPLEEN: Within normal limits.  PANCREAS: Within normal limits.  ADRENALS: Within normal limits.  KIDNEYS/URETERS: Mild left hydronephrosis but with no stone or other   obstructing lesion identified. Suggestion of left renal pelvis and   ureteral wall thickening not well evaluated without IV contrast. Kidneys   otherwise similar to prior with mild bilateral perinephric stranding. No   concerning renal mass.    BLADDER: Nondilated. No stones or filling defects.  REPRODUCTIVE ORGANS: Small calcification in the uterus. No concerning   findings.    BOWEL: No bowel obstruction. Appendix is normal.  PERITONEUM: No ascites.  VESSELS:No abdominal aortic aneurysm. Atherosclerosis similar to prior.  RETROPERITONEUM/LYMPH NODES: No lymphadenopathy.  ABDOMINAL WALL: Within normal limits.  BONES: No acute findings. Bilateral L5 spondylolysis with mild   degenerative anterolisthesis ofL5 on S1. Prominent degenerative changes   lower lumbar spine.    IMPRESSION:  Mild left hydronephrosis is new. Left ureter not dilated. Suggestion of   left renal pelvis wall thickening not well evaluated without IV contrast.   Urinary tract infection could appear this way though not definitive.   Early or mild proximal left ureteral obstruction also possible though no   stone or other obstructing lesion is evident.    No other significant change. No bowel obstruction.    Assessment :   71yo F with PMHx IDDM2, HFrEF, HTN, hypothyroidism, LBBB, chronic lymphedema admitted with septic shock and Ecoli bacteremia sec Left pyelo with hydro- no obtructive uropathy on CT    Off pressors  Worsening DUNCAN started on HD    Plan :   Cont Rocephin  Fu repeat blood cultures- NGTD  HD per renal  Trend temps and cbc  Pulm toileting  Increase activity/OOB to chair    D/w pt.    Continue with present regiment.  Appropriate use of antibiotics and adverse effects reviewed.      > 35 minutes were spent in direct patient care reviewing notes, medications ,labs data/ imaging , discussion with multidisciplinary team.    Thank you for allowing me to participate in care of your patient .    Robby Clark MD  Infectious Disease  [phone]

## 2023-06-07 NOTE — PROGRESS NOTE ADULT - SUBJECTIVE AND OBJECTIVE BOX
Interval Events: There were no acute events overnight. Patient seen and examined at bedside this AM. She reports feeling well and that her nausea has resolved following the ativan yesterday. Her breathing has improved as well and patient no longer requiring supplemental O2.      Review of Systems:  Constitutional: No fever, chills, fatigue  Neuro: No headache, numbness, weakness  Resp: No cough, wheezing, shortness of breath  CVS: No chest pain, palpitations, leg swelling  GI: No abdominal pain, nausea, vomiting, diarrhea   : No dysuria, frequency, incontinence  Skin: No itching, burning, rashes, or lesions   Msk: No joint pain or swelling  Psych: No depression, anxiety, mood swings    ICU Vital Signs Last 24 Hrs  T(C): 36.7 (07 Jun 2023 11:46), Max: 36.7 (06 Jun 2023 21:37)  T(F): 98.1 (07 Jun 2023 11:46), Max: 98.1 (06 Jun 2023 21:37)  HR: 80 (07 Jun 2023 14:00) (66 - 90)  BP: 108/53 (07 Jun 2023 14:00) (94/53 - 145/64)  BP(mean): 77 (07 Jun 2023 14:00) (69 - 96)  ABP: --  ABP(mean): --  RR: 19 (07 Jun 2023 14:00) (15 - 23)  SpO2: 94% (07 Jun 2023 14:00) (92% - 100%)    O2 Parameters below as of 07 Jun 2023 11:40  Patient On (Oxygen Delivery Method): room air              06-06-23 @ 07:01  -  06-07-23 @ 07:00  --------------------------------------------------------  IN: 650 mL / OUT: 1440 mL / NET: -790 mL    06-07-23 @ 07:01  -  06-07-23 @ 14:33  --------------------------------------------------------  IN: 480 mL / OUT: 260 mL / NET: 220 mL        CAPILLARY BLOOD GLUCOSE      POCT Blood Glucose.: 120 mg/dL (07 Jun 2023 11:48)      I&O's Summary    06 Jun 2023 07:01  -  07 Jun 2023 07:00  --------------------------------------------------------  IN: 650 mL / OUT: 1440 mL / NET: -790 mL    07 Jun 2023 07:01  -  07 Jun 2023 14:33  --------------------------------------------------------  IN: 480 mL / OUT: 260 mL / NET: 220 mL        Physical Exam:   Gen: lying down comfortably, NAD  Neuro: awake and alert, responds to all questions and commands  HEENT: NCAT, EOMI, PERRL  CV: RRR, s1s2, + systolic murmurs  Pulm: CTAB, no wheezing/rales/rhonchi  GI: soft NTND, +BS, no suprapubic or CVA tenderness  Ext: trace b/l LE edema  Skin: WWP    Meds:  cefTRIAXone   IVPB IV Intermittent    midodrine   midodrine Oral    atorvastatin Oral  dextrose 50% Injectable IV Push  dextrose 50% Injectable IV Push  insulin glargine Injectable (LANTUS) SubCutaneous  insulin lispro (ADMELOG) corrective regimen sliding scale SubCutaneous  levothyroxine Oral      trimethobenzamide Injectable IntraMuscular PRN      aspirin  chewable Oral  heparin   Injectable SubCutaneous    pantoprazole  Injectable IV Push      sodium chloride 0.9% lock flush IV Push PRN      chlorhexidine 2% Cloths Topical  mupirocin 2% Nasal Both Nostrils  nystatin Powder Topical                              10.4   16.82 )-----------( 150      ( 07 Jun 2023 05:30 )             31.0       06-07    135  |  101  |  118<H>  ----------------------------<  164<H>  4.4   |  25  |  5.00<H>    Ca    8.1<L>      07 Jun 2023 05:30  Phos  5.0     06-07  Mg     2.6     06-07    TPro  5.9<L>  /  Alb  2.1<L>  /  TBili  0.3  /  DBili  x   /  AST  10<L>  /  ALT  13  /  AlkPhos  78  06-07          PT/INR - ( 06 Jun 2023 09:30 )   PT: 12.8 sec;   INR: 1.09 ratio         PTT - ( 06 Jun 2023 09:30 )  PTT:27.8 sec    .Blood Blood-Peripheral   No growth to date. -- 06-06 @ 06:05  .Blood Blood-Peripheral   No growth to date. -- 06-06 @ 06:00  Clean Catch Clean Catch (Midstream)   <10,000 CFU/mL Normal Urogenital Radha -- 06-04 @ 12:00  .Blood Blood-Peripheral   Growth in aerobic and anaerobic bottles: Escherichia coli  ***Blood Panel PCR results on this specimen are available  approximately 3 hours after the Gram stain result.***  Gram stain, PCR, and/or culture results may not always  correspond due to difference in methodologies.  ************************************************************  This PCR assay was performed by multiplex PCR. This  Assay tests for 66 bacterial and resistance gene targets.  Please refer to the Health system Labs test directory  at [email or web address] for details.   Growth in aerobic bottle: Gram Negative Rods  Growth in anaerobic bottle: Gram Negative Rods 06-04 @ 01:52          Radiology: N/A    Bedside Ultrasound: N/A    Tubes/Lines: R IJ double-lumen      GLOBAL ISSUE/BEST PRACTICE:  Analgesia: Y  Sedation: N   HOB elevation: Y  Stress ulcer prophylaxis: Y  VTE prophylaxis: Y   Glycemic control: Y  Nutrition: Y    CODE STATUS:  Full code       Interval Events: unable to perform HD yesterday due to difficulties with catheter, albumin given for volume depletion  Patient seen and examined at bedside this AM. She reports feeling well and that her nausea has resolved following the ativan yesterday. Her breathing has improved as well and patient no longer requiring supplemental O2.      Review of Systems:  Constitutional: No fever, chills, fatigue  Neuro: No headache, numbness, weakness  Resp: No cough, wheezing, shortness of breath  CVS: No chest pain, palpitations, leg swelling  GI: No abdominal pain, nausea, vomiting, diarrhea   : No dysuria, frequency, incontinence  Skin: No itching, burning, rashes, or lesions   Msk: No joint pain or swelling  Psych: No depression, anxiety, mood swings    ICU Vital Signs Last 24 Hrs  T(C): 36.7 (07 Jun 2023 11:46), Max: 36.7 (06 Jun 2023 21:37)  T(F): 98.1 (07 Jun 2023 11:46), Max: 98.1 (06 Jun 2023 21:37)  HR: 80 (07 Jun 2023 14:00) (66 - 90)  BP: 108/53 (07 Jun 2023 14:00) (94/53 - 145/64)  BP(mean): 77 (07 Jun 2023 14:00) (69 - 96)  ABP: --  ABP(mean): --  RR: 19 (07 Jun 2023 14:00) (15 - 23)  SpO2: 94% (07 Jun 2023 14:00) (92% - 100%)    O2 Parameters below as of 07 Jun 2023 11:40  Patient On (Oxygen Delivery Method): room air              06-06-23 @ 07:01  -  06-07-23 @ 07:00  --------------------------------------------------------  IN: 650 mL / OUT: 1440 mL / NET: -790 mL    06-07-23 @ 07:01  -  06-07-23 @ 14:33  --------------------------------------------------------  IN: 480 mL / OUT: 260 mL / NET: 220 mL        CAPILLARY BLOOD GLUCOSE      POCT Blood Glucose.: 120 mg/dL (07 Jun 2023 11:48)      I&O's Summary    06 Jun 2023 07:01  -  07 Jun 2023 07:00  --------------------------------------------------------  IN: 650 mL / OUT: 1440 mL / NET: -790 mL    07 Jun 2023 07:01  -  07 Jun 2023 14:33  --------------------------------------------------------  IN: 480 mL / OUT: 260 mL / NET: 220 mL        Physical Exam:   Gen: lying down comfortably, NAD  Neuro: awake and alert, responds to all questions and commands  HEENT: NCAT, EOMI, PERRL  CV: RRR, s1s2, + systolic murmurs  Pulm: CTAB, no wheezing/rales/rhonchi  GI: soft NTND, +BS, no suprapubic or CVA tenderness  Ext: trace b/l LE edema  Skin: WWP    Meds:  cefTRIAXone   IVPB IV Intermittent    midodrine   midodrine Oral    atorvastatin Oral  dextrose 50% Injectable IV Push  dextrose 50% Injectable IV Push  insulin glargine Injectable (LANTUS) SubCutaneous  insulin lispro (ADMELOG) corrective regimen sliding scale SubCutaneous  levothyroxine Oral      trimethobenzamide Injectable IntraMuscular PRN      aspirin  chewable Oral  heparin   Injectable SubCutaneous    pantoprazole  Injectable IV Push      sodium chloride 0.9% lock flush IV Push PRN      chlorhexidine 2% Cloths Topical  mupirocin 2% Nasal Both Nostrils  nystatin Powder Topical                              10.4   16.82 )-----------( 150      ( 07 Jun 2023 05:30 )             31.0       06-07    135  |  101  |  118<H>  ----------------------------<  164<H>  4.4   |  25  |  5.00<H>    Ca    8.1<L>      07 Jun 2023 05:30  Phos  5.0     06-07  Mg     2.6     06-07    TPro  5.9<L>  /  Alb  2.1<L>  /  TBili  0.3  /  DBili  x   /  AST  10<L>  /  ALT  13  /  AlkPhos  78  06-07          PT/INR - ( 06 Jun 2023 09:30 )   PT: 12.8 sec;   INR: 1.09 ratio         PTT - ( 06 Jun 2023 09:30 )  PTT:27.8 sec    .Blood Blood-Peripheral   No growth to date. -- 06-06 @ 06:05  .Blood Blood-Peripheral   No growth to date. -- 06-06 @ 06:00  Clean Catch Clean Catch (Midstream)   <10,000 CFU/mL Normal Urogenital Radha -- 06-04 @ 12:00  .Blood Blood-Peripheral   Growth in aerobic and anaerobic bottles: Escherichia coli  ***Blood Panel PCR results on this specimen are available  approximately 3 hours after the Gram stain result.***  Gram stain, PCR, and/or culture results may not always  correspond due to difference in methodologies.  ************************************************************  This PCR assay was performed by multiplex PCR. This  Assay tests for 66 bacterial and resistance gene targets.  Please refer to the [facility] Labs test directory  at [email or web address] for details.   Growth in aerobic bottle: Gram Negative Rods  Growth in anaerobic bottle: Gram Negative Rods 06-04 @ 01:52          Radiology: N/A    Bedside Ultrasound: N/A    Tubes/Lines: R IJ HD catheter, hardy      GLOBAL ISSUE/BEST PRACTICE:  Analgesia: Y  Sedation: N   HOB elevation: Y  Stress ulcer prophylaxis: Y  VTE prophylaxis: Y   Glycemic control: Y  Nutrition: Y    CODE STATUS:  Full code

## 2023-06-08 LAB
ALBUMIN SERPL ELPH-MCNC: 2.2 G/DL — LOW (ref 3.3–5)
ALP SERPL-CCNC: 80 U/L — SIGNIFICANT CHANGE UP (ref 40–120)
ALT FLD-CCNC: 11 U/L — LOW (ref 12–78)
ANION GAP SERPL CALC-SCNC: 9 MMOL/L — SIGNIFICANT CHANGE UP (ref 5–17)
AST SERPL-CCNC: 13 U/L — LOW (ref 15–37)
BASOPHILS # BLD AUTO: 0.04 K/UL — SIGNIFICANT CHANGE UP (ref 0–0.2)
BASOPHILS NFR BLD AUTO: 0.2 % — SIGNIFICANT CHANGE UP (ref 0–2)
BILIRUB SERPL-MCNC: 0.5 MG/DL — SIGNIFICANT CHANGE UP (ref 0.2–1.2)
BUN SERPL-MCNC: 96 MG/DL — HIGH (ref 7–23)
CALCIUM SERPL-MCNC: 8.2 MG/DL — LOW (ref 8.5–10.1)
CHLORIDE SERPL-SCNC: 101 MMOL/L — SIGNIFICANT CHANGE UP (ref 96–108)
CO2 SERPL-SCNC: 24 MMOL/L — SIGNIFICANT CHANGE UP (ref 22–31)
CREAT SERPL-MCNC: 4.1 MG/DL — HIGH (ref 0.5–1.3)
EGFR: 11 ML/MIN/1.73M2 — LOW
EOSINOPHIL # BLD AUTO: 0.46 K/UL — SIGNIFICANT CHANGE UP (ref 0–0.5)
EOSINOPHIL NFR BLD AUTO: 2.8 % — SIGNIFICANT CHANGE UP (ref 0–6)
GLUCOSE SERPL-MCNC: 168 MG/DL — HIGH (ref 70–99)
HCT VFR BLD CALC: 33.3 % — LOW (ref 34.5–45)
HGB BLD-MCNC: 10.9 G/DL — LOW (ref 11.5–15.5)
IMM GRANULOCYTES NFR BLD AUTO: 1.3 % — HIGH (ref 0–0.9)
LYMPHOCYTES # BLD AUTO: 0.87 K/UL — LOW (ref 1–3.3)
LYMPHOCYTES # BLD AUTO: 5.3 % — LOW (ref 13–44)
MAGNESIUM SERPL-MCNC: 2.4 MG/DL — SIGNIFICANT CHANGE UP (ref 1.6–2.6)
MCHC RBC-ENTMCNC: 29.1 PG — SIGNIFICANT CHANGE UP (ref 27–34)
MCHC RBC-ENTMCNC: 32.7 GM/DL — SIGNIFICANT CHANGE UP (ref 32–36)
MCV RBC AUTO: 88.8 FL — SIGNIFICANT CHANGE UP (ref 80–100)
MONOCYTES # BLD AUTO: 1.84 K/UL — HIGH (ref 0–0.9)
MONOCYTES NFR BLD AUTO: 11.2 % — SIGNIFICANT CHANGE UP (ref 2–14)
NEUTROPHILS # BLD AUTO: 12.96 K/UL — HIGH (ref 1.8–7.4)
NEUTROPHILS NFR BLD AUTO: 79.2 % — HIGH (ref 43–77)
NRBC # BLD: 0 /100 WBCS — SIGNIFICANT CHANGE UP (ref 0–0)
PHOSPHATE SERPL-MCNC: 3.6 MG/DL — SIGNIFICANT CHANGE UP (ref 2.5–4.5)
PLATELET # BLD AUTO: 149 K/UL — LOW (ref 150–400)
POTASSIUM SERPL-MCNC: 3.8 MMOL/L — SIGNIFICANT CHANGE UP (ref 3.5–5.3)
POTASSIUM SERPL-SCNC: 3.8 MMOL/L — SIGNIFICANT CHANGE UP (ref 3.5–5.3)
PROT SERPL-MCNC: 5.9 G/DL — LOW (ref 6–8.3)
RBC # BLD: 3.75 M/UL — LOW (ref 3.8–5.2)
RBC # FLD: 13.7 % — SIGNIFICANT CHANGE UP (ref 10.3–14.5)
SODIUM SERPL-SCNC: 134 MMOL/L — LOW (ref 135–145)
WBC # BLD: 16.38 K/UL — HIGH (ref 3.8–10.5)
WBC # FLD AUTO: 16.38 K/UL — HIGH (ref 3.8–10.5)

## 2023-06-08 PROCEDURE — 99233 SBSQ HOSP IP/OBS HIGH 50: CPT | Mod: GC

## 2023-06-08 RX ORDER — SODIUM CHLORIDE 9 MG/ML
1000 INJECTION, SOLUTION INTRAVENOUS
Refills: 0 | Status: DISCONTINUED | OUTPATIENT
Start: 2023-06-08 | End: 2023-06-19

## 2023-06-08 RX ORDER — INSULIN LISPRO 100/ML
VIAL (ML) SUBCUTANEOUS AT BEDTIME
Refills: 0 | Status: DISCONTINUED | OUTPATIENT
Start: 2023-06-08 | End: 2023-06-14

## 2023-06-08 RX ORDER — ALBUMIN HUMAN 25 %
50 VIAL (ML) INTRAVENOUS ONCE
Refills: 0 | Status: COMPLETED | OUTPATIENT
Start: 2023-06-08 | End: 2023-06-08

## 2023-06-08 RX ORDER — INSULIN LISPRO 100/ML
VIAL (ML) SUBCUTANEOUS
Refills: 0 | Status: DISCONTINUED | OUTPATIENT
Start: 2023-06-08 | End: 2023-06-14

## 2023-06-08 RX ADMIN — MIDODRINE HYDROCHLORIDE 5 MILLIGRAM(S): 2.5 TABLET ORAL at 05:03

## 2023-06-08 RX ADMIN — Medication 2: at 05:16

## 2023-06-08 RX ADMIN — NYSTATIN CREAM 1 APPLICATION(S): 100000 CREAM TOPICAL at 05:05

## 2023-06-08 RX ADMIN — CHLORHEXIDINE GLUCONATE 1 APPLICATION(S): 213 SOLUTION TOPICAL at 05:06

## 2023-06-08 RX ADMIN — Medication 75 MICROGRAM(S): at 05:04

## 2023-06-08 RX ADMIN — PANTOPRAZOLE SODIUM 40 MILLIGRAM(S): 20 TABLET, DELAYED RELEASE ORAL at 13:07

## 2023-06-08 RX ADMIN — Medication 4: at 17:08

## 2023-06-08 RX ADMIN — NYSTATIN CREAM 1 APPLICATION(S): 100000 CREAM TOPICAL at 21:46

## 2023-06-08 RX ADMIN — MIDODRINE HYDROCHLORIDE 5 MILLIGRAM(S): 2.5 TABLET ORAL at 21:50

## 2023-06-08 RX ADMIN — MIDODRINE HYDROCHLORIDE 5 MILLIGRAM(S): 2.5 TABLET ORAL at 13:07

## 2023-06-08 RX ADMIN — Medication 100 MILLILITER(S): at 10:33

## 2023-06-08 RX ADMIN — MUPIROCIN 1 APPLICATION(S): 20 OINTMENT TOPICAL at 21:44

## 2023-06-08 RX ADMIN — MUPIROCIN 1 APPLICATION(S): 20 OINTMENT TOPICAL at 05:04

## 2023-06-08 RX ADMIN — Medication 4: at 13:09

## 2023-06-08 RX ADMIN — ATORVASTATIN CALCIUM 80 MILLIGRAM(S): 80 TABLET, FILM COATED ORAL at 21:50

## 2023-06-08 RX ADMIN — Medication 81 MILLIGRAM(S): at 13:07

## 2023-06-08 RX ADMIN — HEPARIN SODIUM 7500 UNIT(S): 5000 INJECTION INTRAVENOUS; SUBCUTANEOUS at 21:47

## 2023-06-08 RX ADMIN — INSULIN GLARGINE 25 UNIT(S): 100 INJECTION, SOLUTION SUBCUTANEOUS at 08:59

## 2023-06-08 RX ADMIN — CEFTRIAXONE 100 MILLIGRAM(S): 500 INJECTION, POWDER, FOR SOLUTION INTRAMUSCULAR; INTRAVENOUS at 13:06

## 2023-06-08 RX ADMIN — HEPARIN SODIUM 7500 UNIT(S): 5000 INJECTION INTRAVENOUS; SUBCUTANEOUS at 05:04

## 2023-06-08 RX ADMIN — HEPARIN SODIUM 7500 UNIT(S): 5000 INJECTION INTRAVENOUS; SUBCUTANEOUS at 13:07

## 2023-06-08 NOTE — PROGRESS NOTE ADULT - SUBJECTIVE AND OBJECTIVE BOX
Patient is a 72y old  Female who presents with a chief complaint of sepsis (04 Jun 2023 10:59)       HPI: female with history of insulin-dependent diabetes, CHF, hypertension who presented to the ED with nausea and vomiting for several days.  She has experienced no significant abdominal pain and history of multiple dark emesis and 1 episode of diarrhea today.  She denies fever or chills.  Denies URI symptoms.  She denies significant change in her urinary patters. CT performed on admission demonstrated mild left hydronephrosis without ureteral dilatation or obstructing calculus. Found to have DUNCAN and hypotension. Renal consulted for further eval. Has not seen a Nephrologist outpatient. Currently asymptomatic.      No acute events noted overnight    PAST MEDICAL & SURGICAL HISTORY:  Hypertension      Diabetes      Lymphedema      H/O left bundle branch block      History of left bundle branch block (LBBB)      Systolic heart failure, chronic      H/O cataract  2020      History of surgical removal of meniscus of knee  left in 1971      Frozen shoulder  2000      H/O Achilles tendon repair  lengthened bilaterally, 2000      Fractured skull  1968         MEDICATIONS  (STANDING):  aspirin  chewable 81 milliGRAM(s) Oral daily  atorvastatin 80 milliGRAM(s) Oral at bedtime  cefTRIAXone   IVPB 2000 milliGRAM(s) IV Intermittent every 24 hours  chlorhexidine 2% Cloths 1 Application(s) Topical <User Schedule>  dextrose 50% Injectable 50 milliLiter(s) IV Push every 15 minutes  dextrose 50% Injectable 25 milliLiter(s) IV Push every 15 minutes  heparin   Injectable 7500 Unit(s) SubCutaneous every 8 hours  insulin glargine Injectable (LANTUS) 25 Unit(s) SubCutaneous every morning  insulin lispro (ADMELOG) corrective regimen sliding scale   SubCutaneous every 6 hours  levothyroxine 75 MICROGram(s) Oral daily  midodrine      midodrine 5 milliGRAM(s) Oral every 8 hours  mupirocin 2% Nasal 1 Application(s) Both Nostrils two times a day  nystatin Powder 1 Application(s) Topical two times a day  pantoprazole  Injectable 40 milliGRAM(s) IV Push daily    MEDICATIONS  (PRN):  sodium chloride 0.9% lock flush 10 milliLiter(s) IV Push every 1 hour PRN Pre/post blood products, medications, blood draw, and to maintain line patency  trimethobenzamide Injectable 200 milliGRAM(s) IntraMuscular every 8 hours PRN Nausea and/or Vomiting    FAMILY HISTORY:  Family history of CVA  mom, age 57    [place]    Social History:Non smoker        Allergies    penicillins (Hives)    Intolerances         REVIEW OF SYSTEMS:    Constitutional: Denies fatigue  HEENT: Denies headaches and dizziness  Respiratory: denies SOB, cough, or wheezing  Cardiovascular: denies CP, palpitations  Gastrointestinal: + N/V - improving  Genitourinary: denies painful urination, increased frequency, urgency, or bloody urine  Skin: denies rashes or itching  Musculoskeletal: denies muscle aches, joint swelling  Neurologic: Denies generalized weakness, denies loss of sensation, numbness, or tingling      ICU Vital Signs Last 24 Hrs  T(C): 36.5 (08 Jun 2023 11:50), Max: 37.2 (07 Jun 2023 16:46)  T(F): 97.7 (08 Jun 2023 11:50), Max: 99 (07 Jun 2023 16:46)  HR: 79 (08 Jun 2023 12:00) (77 - 101)  BP: 104/50 (08 Jun 2023 12:00) (86/42 - 134/61)  BP(mean): 72 (08 Jun 2023 12:00) (60 - 90)  ABP: --  ABP(mean): --  RR: 22 (08 Jun 2023 12:00) (16 - 22)  SpO2: 92% (08 Jun 2023 12:00) (92% - 99%)    O2 Parameters below as of 08 Jun 2023 11:50  Patient On (Oxygen Delivery Method): room air      PHYSICAL EXAM:    GENERAL: NAD  HEAD:  Atraumatic, Normocephalic  EYES: EOMI, conjunctiva and sclera clear  ENMT: No Drainage from nares, No drainage from ears  NECK: Supple, +dialysis catheter intact  NERVOUS SYSTEM:  Awake and Alert  CHEST/LUNG: Clear to percussion bilaterally; No rales, rhonchi, wheezing, or rubs  HEART: Regular rate and rhythm; No murmurs, rubs, or gallops  ABDOMEN: Soft, Nontender, Nondistended; Bowel sounds present, obese  EXTREMITIES:  + Edema  SKIN: No rashes No obvious ecchymosis      LABS:                                     10.9   16.38 )-----------( 149      ( 08 Jun 2023 05:20 )             33.3     06-08    134<L>  |  101  |  96<H>  ----------------------------<  168<H>  3.8   |  24  |  4.10<H>    Ca    8.2<L>      08 Jun 2023 05:20  Phos  3.6     06-08  Mg     2.4     06-08    TPro  5.9<L>  /  Alb  2.2<L>  /  TBili  0.5  /  DBili  x   /  AST  13<L>  /  ALT  11<L>  /  AlkPhos  80  06-08

## 2023-06-08 NOTE — PROGRESS NOTE ADULT - ASSESSMENT
Acute on CKD Stage 4  Sepsis/UTI  L Hydronephrosis  Uremia      -DUNCAN from sepsis/hypotension and renal hypoperfusion; unilateral mild hydro should not cause this degree of DUNCAN  -Check urine indices  -Abx, renal dosing  -Urology eval noted  -Monitor chemistries and urine output  -Renal indices worsened with uremic symptoms leading to dialysis; still urinating, but still documented < 1L  -Consented for dialysis  -Discussed with patient risks and benefits of dialysis in depth up to and including death, she agrees to dialysis if necessary  -Initiated dialysis 6/6/23; ICU placed access; However, due to collapsing vasculature, dialysis was not able to be done effectively and had about 20 mins on the machine. Now s/p IV albumin and IVF boluses. HD yesterday.   -Plan for HD today and again tomorrow,     D/W ICU    Thank you

## 2023-06-08 NOTE — PHYSICAL THERAPY INITIAL EVALUATION ADULT - PERTINENT HX OF CURRENT PROBLEM, REHAB EVAL
71yo F with PMHx IDDM2, HFrEF, HTN, hypothyroidism, LBBB, chronic lymphedema presenting with decreased appetite and multiple episodes of emesis. Recent pansensitive E.coli UTI in April treated with Bactrim. Now with L hydronephrosis noted on CT. Admitted to ICU for septic shock 2/2 UTI.

## 2023-06-08 NOTE — PROGRESS NOTE ADULT - SUBJECTIVE AND OBJECTIVE BOX
Interval Events: There were no acute events overnight. Patient reports feeling well this AM and has no complaints.     Review of Systems:  Constitutional: No fever, chills, fatigue  Neuro: No headache, numbness, weakness  Resp: No cough, wheezing, shortness of breath  CVS: No chest pain, palpitations, leg swelling  GI: No abdominal pain, nausea, vomiting, diarrhea   : No dysuria, frequency, incontinence  Skin: No itching, burning, rashes, or lesions   Msk: No joint pain or swelling  Psych: No depression, anxiety, mood swings    ICU Vital Signs Last 24 Hrs  T(C): 36.5 (08 Jun 2023 11:50), Max: 37.2 (07 Jun 2023 16:46)  T(F): 97.7 (08 Jun 2023 11:50), Max: 99 (07 Jun 2023 16:46)  HR: 79 (08 Jun 2023 12:00) (77 - 95)  BP: 104/50 (08 Jun 2023 12:00) (86/42 - 134/61)  BP(mean): 72 (08 Jun 2023 12:00) (60 - 90)  ABP: --  ABP(mean): --  RR: 22 (08 Jun 2023 12:00) (16 - 22)  SpO2: 92% (08 Jun 2023 12:00) (92% - 99%)    O2 Parameters below as of 08 Jun 2023 11:50  Patient On (Oxygen Delivery Method): room air              06-07-23 @ 07:01  -  06-08-23 @ 07:00  --------------------------------------------------------  IN: 900 mL / OUT: 1060 mL / NET: -160 mL    06-08-23 @ 07:01  -  06-08-23 @ 15:42  --------------------------------------------------------  IN: 0 mL / OUT: 0 mL / NET: 0 mL        CAPILLARY BLOOD GLUCOSE      POCT Blood Glucose.: 201 mg/dL (08 Jun 2023 12:22)      I&O's Summary    07 Jun 2023 07:01  -  08 Jun 2023 07:00  --------------------------------------------------------  IN: 900 mL / OUT: 1060 mL / NET: -160 mL    08 Jun 2023 07:01  -  08 Jun 2023 15:42  --------------------------------------------------------  IN: 0 mL / OUT: 0 mL / NET: 0 mL        Physical Exam:   Gen: lying down comfortably, NAD  Neuro: awake and alert, responds to all questions and commands  HEENT: NCAT, EOMI, PERRL  CV: RRR, s1s2, + systolic murmurs  Pulm: CTAB, no wheezing/rales/rhonchi  GI: soft NTND, +BS, no suprapubic or CVA tenderness  Ext: trace b/l LE edema  Skin: WWP, +erythematous patches on BLE bilaterally,     Meds:  cefTRIAXone   IVPB IV Intermittent    midodrine Oral  midodrine     atorvastatin Oral  dextrose 50% Injectable IV Push  dextrose 50% Injectable IV Push  insulin glargine Injectable (LANTUS) SubCutaneous  insulin lispro (ADMELOG) corrective regimen sliding scale SubCutaneous  levothyroxine Oral          aspirin  chewable Oral  heparin   Injectable SubCutaneous    pantoprazole  Injectable IV Push      sodium chloride 0.9% lock flush IV Push PRN      chlorhexidine 2% Cloths Topical  mupirocin 2% Nasal Both Nostrils  nystatin Powder Topical                              10.9   16.38 )-----------( 149      ( 08 Jun 2023 05:20 )             33.3       06-08    134<L>  |  101  |  96<H>  ----------------------------<  168<H>  3.8   |  24  |  4.10<H>    Ca    8.2<L>      08 Jun 2023 05:20  Phos  3.6     06-08  Mg     2.4     06-08    TPro  5.9<L>  /  Alb  2.2<L>  /  TBili  0.5  /  DBili  x   /  AST  13<L>  /  ALT  11<L>  /  AlkPhos  80  06-08              .Blood Blood-Peripheral   No growth to date. -- 06-06 @ 06:05  .Blood Blood-Peripheral   No growth to date. -- 06-06 @ 06:00  Clean Catch Clean Catch (Midstream)   <10,000 CFU/mL Normal Urogenital Radha -- 06-04 @ 12:00  .Blood Blood-Peripheral   Growth in aerobic and anaerobic bottles: Escherichia coli  ***Blood Panel PCR results on this specimen are available  approximately 3 hours after the Gram stain result.***  Gram stain, PCR, and/or culture results may not always  correspond due to difference in methodologies.  ************************************************************  This PCR assay was performed by multiplex PCR. This  Assay tests for 66 bacterial and resistance gene targets.  Please refer to the [facility] Labs test directory  at [email or web address] for details.   Growth in aerobic bottle: Gram Negative Rods  Growth in anaerobic bottle: Gram Negative Rods 06-04 @ 01:52              Radiology: N/A    Bedside Ultrasound: N/A    Tubes/Lines: R IJ HD catheter, hardy      GLOBAL ISSUE/BEST PRACTICE:  Analgesia: Y  Sedation: N   HOB elevation: Y  Stress ulcer prophylaxis: Y  VTE prophylaxis: Y   Glycemic control: Y  Nutrition: Y    CODE STATUS:  Full code   Interval Events: There were no acute events overnight. Patient reports feeling well this AM and has no complaints.     Review of Systems:  Constitutional: No fever, chills, fatigue  Neuro: No headache, numbness, weakness  Resp: No cough, wheezing, shortness of breath  CVS: No chest pain, palpitations, leg swelling  GI: No abdominal pain, nausea, vomiting, diarrhea   : No dysuria, frequency, incontinence  Skin: No itching, burning, rashes, or lesions   Msk: No joint pain or swelling  Psych: No depression, anxiety, mood swings    ICU Vital Signs Last 24 Hrs  T(C): 36.5 (08 Jun 2023 11:50), Max: 37.2 (07 Jun 2023 16:46)  T(F): 97.7 (08 Jun 2023 11:50), Max: 99 (07 Jun 2023 16:46)  HR: 79 (08 Jun 2023 12:00) (77 - 95)  BP: 104/50 (08 Jun 2023 12:00) (86/42 - 134/61)  BP(mean): 72 (08 Jun 2023 12:00) (60 - 90)  ABP: --  ABP(mean): --  RR: 22 (08 Jun 2023 12:00) (16 - 22)  SpO2: 92% (08 Jun 2023 12:00) (92% - 99%)    O2 Parameters below as of 08 Jun 2023 11:50  Patient On (Oxygen Delivery Method): room air              06-07-23 @ 07:01  -  06-08-23 @ 07:00  --------------------------------------------------------  IN: 900 mL / OUT: 1060 mL / NET: -160 mL    06-08-23 @ 07:01  -  06-08-23 @ 15:42  --------------------------------------------------------  IN: 0 mL / OUT: 0 mL / NET: 0 mL        CAPILLARY BLOOD GLUCOSE      POCT Blood Glucose.: 201 mg/dL (08 Jun 2023 12:22)      I&O's Summary    07 Jun 2023 07:01  -  08 Jun 2023 07:00  --------------------------------------------------------  IN: 900 mL / OUT: 1060 mL / NET: -160 mL    08 Jun 2023 07:01  -  08 Jun 2023 15:42  --------------------------------------------------------  IN: 0 mL / OUT: 0 mL / NET: 0 mL        Physical Exam:   Gen: lying down comfortably, NAD  Neuro: awake and alert, responds to all questions and commands  HEENT: NCAT, EOMI, PERRL  CV: RRR, s1s2, + systolic murmurs  Pulm: CTAB, no wheezing/rales/rhonchi  GI: soft NTND, +BS, no suprapubic or CVA tenderness  Ext: trace b/l LE edema  Skin: WWP, +erythematous patches on BLE bilaterally,     Meds:  cefTRIAXone   IVPB IV Intermittent    midodrine Oral  midodrine     atorvastatin Oral  dextrose 50% Injectable IV Push  dextrose 50% Injectable IV Push  insulin glargine Injectable (LANTUS) SubCutaneous  insulin lispro (ADMELOG) corrective regimen sliding scale SubCutaneous  levothyroxine Oral          aspirin  chewable Oral  heparin   Injectable SubCutaneous    pantoprazole  Injectable IV Push      sodium chloride 0.9% lock flush IV Push PRN      chlorhexidine 2% Cloths Topical  mupirocin 2% Nasal Both Nostrils  nystatin Powder Topical                              10.9   16.38 )-----------( 149      ( 08 Jun 2023 05:20 )             33.3       06-08    134<L>  |  101  |  96<H>  ----------------------------<  168<H>  3.8   |  24  |  4.10<H>    Ca    8.2<L>      08 Jun 2023 05:20  Phos  3.6     06-08  Mg     2.4     06-08    TPro  5.9<L>  /  Alb  2.2<L>  /  TBili  0.5  /  DBili  x   /  AST  13<L>  /  ALT  11<L>  /  AlkPhos  80  06-08              .Blood Blood-Peripheral   No growth to date. -- 06-06 @ 06:05  .Blood Blood-Peripheral   No growth to date. -- 06-06 @ 06:00  Clean Catch Clean Catch (Midstream)   <10,000 CFU/mL Normal Urogenital Radha -- 06-04 @ 12:00  .Blood Blood-Peripheral   Growth in aerobic and anaerobic bottles: Escherichia coli  ***Blood Panel PCR results on this specimen are available  approximately 3 hours after the Gram stain result.***  Gram stain, PCR, and/or culture results may not always  correspond due to difference in methodologies.  ************************************************************  This PCR assay was performed by multiplex PCR. This  Assay tests for 66 bacterial and resistance gene targets.  Please refer to the [facility] Labs test directory  at [email or web address] for details.   Growth in aerobic bottle: Gram Negative Rods  Growth in anaerobic bottle: Gram Negative Rods 06-04 @ 01:52              Radiology: N/A    Bedside Ultrasound: N/A    Tubes/Lines: R IJ HD catheter, [place]      GLOBAL ISSUE/BEST PRACTICE:  Analgesia: Y  Sedation: N   HOB elevation: Y  Stress ulcer prophylaxis: Y  VTE prophylaxis: Y   Glycemic control: Y  Nutrition: Y    CODE STATUS:  Full code   Interval Events: There were no acute events overnight. Patient reports feeling well this AM and has no complaints. Tolerating diet    Review of Systems:  Constitutional: No fever, chills, fatigue  Neuro: No headache, numbness, weakness  Resp: No cough, wheezing, shortness of breath  CVS: No chest pain, palpitations, leg swelling  GI: No abdominal pain, nausea, vomiting, diarrhea   : No dysuria, frequency, incontinence  Skin: No itching, burning, rashes, or lesions   Msk: No joint pain or swelling  Psych: No depression, anxiety, mood swings    ICU Vital Signs Last 24 Hrs  T(C): 36.5 (08 Jun 2023 11:50), Max: 37.2 (07 Jun 2023 16:46)  T(F): 97.7 (08 Jun 2023 11:50), Max: 99 (07 Jun 2023 16:46)  HR: 79 (08 Jun 2023 12:00) (77 - 95)  BP: 104/50 (08 Jun 2023 12:00) (86/42 - 134/61)  BP(mean): 72 (08 Jun 2023 12:00) (60 - 90)  ABP: --  ABP(mean): --  RR: 22 (08 Jun 2023 12:00) (16 - 22)  SpO2: 92% (08 Jun 2023 12:00) (92% - 99%)    O2 Parameters below as of 08 Jun 2023 11:50  Patient On (Oxygen Delivery Method): room air              06-07-23 @ 07:01  -  06-08-23 @ 07:00  --------------------------------------------------------  IN: 900 mL / OUT: 1060 mL / NET: -160 mL    06-08-23 @ 07:01  -  06-08-23 @ 15:42  --------------------------------------------------------  IN: 0 mL / OUT: 0 mL / NET: 0 mL        CAPILLARY BLOOD GLUCOSE      POCT Blood Glucose.: 201 mg/dL (08 Jun 2023 12:22)      I&O's Summary    07 Jun 2023 07:01  -  08 Jun 2023 07:00  --------------------------------------------------------  IN: 900 mL / OUT: 1060 mL / NET: -160 mL    08 Jun 2023 07:01  -  08 Jun 2023 15:42  --------------------------------------------------------  IN: 0 mL / OUT: 0 mL / NET: 0 mL        Physical Exam:   Gen: lying down comfortably, NAD  Neuro: awake and alert, responds to all questions and commands  HEENT: NCAT, EOMI, PERRL  CV: RRR, s1s2, + systolic murmurs  Pulm: CTAB, no wheezing/rales/rhonchi  GI: soft NTND, +BS, no suprapubic or CVA tenderness  Ext: trace b/l LE edema  Skin: WWP, +erythematous patches on BLE bilaterally,     Meds:  cefTRIAXone   IVPB IV Intermittent    midodrine Oral  midodrine     atorvastatin Oral  dextrose 50% Injectable IV Push  dextrose 50% Injectable IV Push  insulin glargine Injectable (LANTUS) SubCutaneous  insulin lispro (ADMELOG) corrective regimen sliding scale SubCutaneous  levothyroxine Oral          aspirin  chewable Oral  heparin   Injectable SubCutaneous    pantoprazole  Injectable IV Push      sodium chloride 0.9% lock flush IV Push PRN      chlorhexidine 2% Cloths Topical  mupirocin 2% Nasal Both Nostrils  nystatin Powder Topical                              10.9   16.38 )-----------( 149      ( 08 Jun 2023 05:20 )             33.3       06-08    134<L>  |  101  |  96<H>  ----------------------------<  168<H>  3.8   |  24  |  4.10<H>    Ca    8.2<L>      08 Jun 2023 05:20  Phos  3.6     06-08  Mg     2.4     06-08    TPro  5.9<L>  /  Alb  2.2<L>  /  TBili  0.5  /  DBili  x   /  AST  13<L>  /  ALT  11<L>  /  AlkPhos  80  06-08              .Blood Blood-Peripheral   No growth to date. -- 06-06 @ 06:05  .Blood Blood-Peripheral   No growth to date. -- 06-06 @ 06:00  Clean Catch Clean Catch (Midstream)   <10,000 CFU/mL Normal Urogenital Radha -- 06-04 @ 12:00  .Blood Blood-Peripheral   Growth in aerobic and anaerobic bottles: Escherichia coli  ***Blood Panel PCR results on this specimen are available  approximately 3 hours after the Gram stain result.***  Gram stain, PCR, and/or culture results may not always  correspond due to difference in methodologies.  ************************************************************  This PCR assay was performed by multiplex PCR. This  Assay tests for 66 bacterial and resistance gene targets.  Please refer to the [facility] Labs test directory  at [email or web address] for details.   Growth in aerobic bottle: Gram Negative Rods  Growth in anaerobic bottle: Gram Negative Rods 06-04 @ 01:52              Radiology: N/A    Bedside Ultrasound: N/A    Tubes/Lines: R IJ HD catheter, De [place] Garza      GLOBAL ISSUE/BEST PRACTICE:  Analgesia: Y  Sedation: N   HOB elevation: Y  Stress ulcer prophylaxis: Y  VTE prophylaxis: Y   Glycemic control: Y  Nutrition: Y    CODE STATUS:  Full code

## 2023-06-08 NOTE — PROGRESS NOTE ADULT - SUBJECTIVE AND OBJECTIVE BOX
EVELYN LOPEZ is a 72yFemale , patient examined and chart reviewed.    INTERVAL HPI/ OVERNIGHT EVENTS:   Afebrile. Feeling better.  No events.    PAST MEDICAL & SURGICAL HISTORY:  Hypertension  Diabetes  Lymphedema  H/O left bundle branch block  History of left bundle branch block (LBBB)  Systolic heart failure, chronic  H/O cataract    History of surgical removal of meniscus of knee  left in   Frozen shoulder    H/O Achilles tendon repair  lengthened bilaterally,   Fractured skull        For details regarding the patient's social history, family history, and other miscellaneous elements, please refer the initial infectious diseases consultation and/or the admitting history and physical examination for this admission.     ROS:  CONSTITUTIONAL:  Negative fever or chills  EYES:  Negative  blurry vision or double vision  CARDIOVASCULAR:  Negative for chest pain or palpitations  RESPIRATORY:  Negative for cough, wheezing, or SOB   GASTROINTESTINAL:  Negative for nausea vomiting, diarrhea, constipation, or abdominal pain   GENITOURINARY:  Negative frequency, urgency or dysuria  NEUROLOGIC:  No headache, confusion, dizziness, lightheadedness  All other systems were reviewed and are negative     ALLERGIES  penicillins (Hives)      Current inpatient medications :    ANTIBIOTICS/RELEVANT:  cefTRIAXone   IVPB 2000 milliGRAM(s) IV Intermittent every 24 hours    MEDICATIONS  (STANDING):  aspirin  chewable 81 milliGRAM(s) Oral daily  atorvastatin 80 milliGRAM(s) Oral at bedtime  chlorhexidine 2% Cloths 1 Application(s) Topical <User Schedule>  dextrose 50% Injectable 50 milliLiter(s) IV Push every 15 minutes  dextrose 50% Injectable 25 milliLiter(s) IV Push every 15 minutes  heparin   Injectable 7500 Unit(s) SubCutaneous every 8 hours  insulin glargine Injectable (LANTUS) 25 Unit(s) SubCutaneous every morning  insulin lispro (ADMELOG) corrective regimen sliding scale   SubCutaneous every 6 hours  levothyroxine 75 MICROGram(s) Oral daily  midodrine 5 milliGRAM(s) Oral every 8 hours  midodrine      mupirocin 2% Nasal 1 Application(s) Both Nostrils two times a day  nystatin Powder 1 Application(s) Topical two times a day  pantoprazole  Injectable 40 milliGRAM(s) IV Push daily    MEDICATIONS  (PRN):  sodium chloride 0.9% lock flush 10 milliLiter(s) IV Push every 1 hour PRN Pre/post blood products, medications, blood draw, and to maintain line patency      Objective:  Vital Signs Last 24 Hrs  T(C): 36.5 (2023 11:50), Max: 37.2 (2023 09:50)  T(F): 97.7 (2023 11:50), Max: 98.9 (2023 09:50)  HR: 79 (2023 12:00) (79 - 95)  BP: 104/50 (2023 12:00) (86/42 - 134/61)  BP(mean): 72 (2023 12:00) (60 - 90)  RR: 22 (2023 12:00) (16 - 22)  SpO2: 92% (2023 12:00) (92% - 99%)    Parameters below as of 2023 11:50  Patient On (Oxygen Delivery Method): room air    Physical Exam:  General: no acute distress  Neck: supple, trachea midline right IJ HD cath c/d/i  Lungs: clear, no wheeze/rhonchi  Cardiovascular: regular rate and rhythm, S1 S2  Abdomen: soft, nontender,  bowel sounds normal  Neurological: alert and oriented x3  Skin: no rash  Extremities: + edema      LABS:                        10.9   16.38 )-----------( 149      ( 2023 05:20 )             33.3   06-08    134<L>  |  101  |  96<H>  ----------------------------<  168<H>  3.8   |  24  |  4.10<H>    Ca    8.2<L>      2023 05:20  Phos  3.6     06-08  Mg     2.4     06-08    TPro  5.9<L>  /  Alb  2.2<L>  /  TBili  0.5  /  DBili  x   /  AST  13<L>  /  ALT  11<L>  /  AlkPhos  80  06-08      Urinalysis Basic - ( 2023 12:00 )    Color: Dark Yellow / Appearance: Turbid / S.016 / pH: x  Gluc: x / Ketone: Trace mg/dL  / Bili: Negative / Urobili: 1.0 mg/dL   Blood: x / Protein: 100 mg/dL / Nitrite: Negative   Leuk Esterase: Large / RBC: 6 /HPF / WBC Too Numerous to count /HPF   Sq Epi: x / Non Sq Epi: x / Bacteria: Moderate /HPF    MICROBIOLOGY:  Culture - Blood (collected 2023 06:05)  Source: .Blood Blood-Peripheral  Preliminary Report (2023 09:02):    No growth to date.    Culture - Blood (collected 2023 06:00)  Source: .Blood Blood-Peripheral  Preliminary Report (2023 09:02):    No growth to date.    Culture - Urine (collected 2023 12:00)  Source: Clean Catch Clean Catch (Midstream)  Final Report (2023 15:32):    <10,000 CFU/mL Normal Urogenital Radha    Culture - Blood (collected 2023 01:52)  Source: .Blood Blood-Peripheral  Gram Stain (2023 22:53):    Growth in aerobic bottle: Gram Negative Rods    Growth in anaerobic bottle: Gram Negative Rods  Final Report (2023 16:36):    Growth in aerobic and anaerobic bottles: Escherichia coli    See previous culture [id number]    Culture - Blood (collected 2023 01:52)  Source: .Blood Blood-Peripheral  Gram Stain (2023 21:29):    Growth in aerobic bottle: Gram Negative Rods    Growth in anaerobic bottle: Gram Negative Rods  Final Report (2023 15:58):    Growth in aerobic and anaerobic bottles: Escherichia coli    ***Blood Panel PCR results on this specimen are available    approximately 3 hours after the Gram stain result.***    Gram stain, PCR, and/or culture results may not always    correspond due to difference in methodologies.    ************************************************************    This PCR assay was performed by multiplex PCR. This    Assay tests for 66 bacterial and resistance gene targets.    Please refer to the [facility] test directory    at [email or web address] for details.  Organism: Blood Culture PCR  Escherichia coli (2023 15:58)  Organism: Escherichia coli (2023 15:58)      Method Type: CATRACHITA      -  Amikacin: S <=16      -  Ampicillin: S <=8 These ampicillin results predict results for amoxicillin      -  Ampicillin/Sulbactam: S <=4/2 Enterobacter, Klebsiella aerogenes, Citrobacter, and Serratia may develop resistance during prolonged therapy (3-4 days)      -  Aztreonam: S <=4      -  Cefazolin: S <=2 Enterobacter, Klebsiella aerogenes, Citrobacter, and Serratia may develop resistance during prolonged therapy (3-4 days)      -  Cefepime: S <=2      -  Cefoxitin: S <=8      -  Ceftriaxone: S <=1 Enterobacter, Klebsiella aerogenes, Citrobacter, and Serratia may develop resistance during prolonged therapy      -  Ciprofloxacin: S <=0.25      -  Ertapenem: S <=0.5      -  Gentamicin: S <=2      -  Imipenem: S <=1      -  Levofloxacin: S <=0.5      -  Meropenem: S <=1      -  Piperacillin/Tazobactam: S <=8      -  Tobramycin: S <=2      -  Trimethoprim/Sulfamethoxazole: S <=0.5/9.5  Organism: Blood Culture PCR (2023 15:58)      Method Type: PCR      -  Escherichia coli: Detec    RADIOLOGY & ADDITIONAL STUDIES:    ACC: [id number] EXAM:  CT ABDOMEN AND PELVIS OC   ORDERED BY:  PEDRO LUIS MARTIN     PROCEDURE DATE:  2023          INTERPRETATION:  CLINICAL INFORMATION: Vomiting and abdominal pain.    COMPARISON: 2023 CT abdomen pelvis    CONTRAST/COMPLICATIONS:  IV Contrast: NONE  Oral Contrast: Omnipaque 300  Complications: None reported at time of study completion    PROCEDURE:  CT of the Abdomen and Pelvis was performed.  Sagittal and coronal reformats were performed.    FINDINGS:  LOWER CHEST: Within normal limits.    LIVER: Within normal limits.  BILE DUCTS: Normal caliber.  GALLBLADDER: Within normal limits.  SPLEEN: Within normal limits.  PANCREAS: Within normal limits.  ADRENALS: Within normal limits.  KIDNEYS/URETERS: Mild left hydronephrosis but with no stone or other   obstructing lesion identified. Suggestion of left renal pelvis and   ureteral wall thickening not well evaluated without IV contrast. Kidneys   otherwise similar to prior with mild bilateral perinephric stranding. No   concerning renal mass.    BLADDER: Nondilated. No stones or filling defects.  REPRODUCTIVE ORGANS: Small calcification in the uterus. No concerning   findings.    BOWEL: No bowel obstruction. Appendix is normal.  PERITONEUM: No ascites.  VESSELS:No abdominal aortic aneurysm. Atherosclerosis similar to prior.  RETROPERITONEUM/LYMPH NODES: No lymphadenopathy.  ABDOMINAL WALL: Within normal limits.  BONES: No acute findings. Bilateral L5 spondylolysis with mild   degenerative anterolisthesis ofL5 on S1. Prominent degenerative changes   lower lumbar spine.    IMPRESSION:  Mild left hydronephrosis is new. Left ureter not dilated. Suggestion of   left renal pelvis wall thickening not well evaluated without IV contrast.   Urinary tract infection could appear this way though not definitive.   Early or mild proximal left ureteral obstruction also possible though no   stone or other obstructing lesion is evident.    No other significant change. No bowel obstruction.    Assessment :   73yo F with PMHx IDDM2, HFrEF, HTN, hypothyroidism, LBBB, chronic lymphedema admitted with septic shock and Ecoli bacteremia sec Left pyelo with hydro- no obtructive uropathy on CT    Off pressors  Worsening DUNCAN started on HD    Plan :   Cont Rocephin  Fu repeat blood cultures- NGTD  HD per renal  Trend temps and cbc  Pulm toileting  Increase activity/OOB to chair  Stable from ID standpoint    D/w pt.    Continue with present regiment.  Appropriate use of antibiotics and adverse effects reviewed.      > 35 minutes were spent in direct patient care reviewing notes, medications ,labs data/ imaging , discussion with multidisciplinary team.    Thank you for allowing me to participate in care of your patient .    Robby Clark MD  Infectious Disease  [phone]

## 2023-06-08 NOTE — PROGRESS NOTE ADULT - ATTENDING COMMENTS
72 year old female with history of DM2, HFrEF, HTN, LBBB, hypothyroidism, and chronic lymphedema admitted with septic shock from UTI/pyelonephritis, resulting in ATN requiring HD.    --mentating well  --hypotension, continue low dose midodrine  continue ASA and statin  --stable respiratory status  --DUNCAN due to ATN  1st full HD session today  uremic symptoms (nausea) resolved  --nonobstructing L hydronephrosis, no urologic intervention, continue supportive care  --nausea resolved, advance diet as tolerates  --UTI and pyelonphritis, E. coli bacteremia  continue high dose CTX  --DM2, continue current lantus and ISS  --plan discussed with pt and family in detail at bedside  --stable for floor
72 year old female with history of DM2, HFrEF, HTN, LBBB, hypothyroidism, and chronic lymphedema admitted with septic shock from UTI/pyelonephritis, resulting in DUNCAN.    --mentating well  --septic shock resolved, hold anti-htn meds  continue ASA and statin  --hypoxemia requiring 2L NC  --DUNCAN due to ATN  place hardy, check lytes  may requiring HD, but no indication for today  --nonobstructing L hydronephrosis  unlikely to account for current DUNCAN  no urologic intervention  --nausea likely due to uremia, continue symptomatic treatment  clears as tolerates  --UTI and pyelonphritis, E. coli bacteremia  continue high dose CTX  --DM2 with severe hyperglycemia, transition off insulin gtt to lantus and ISS  --plan discussed with pt and family in detail at bedside
72 year old female with history of DM2, HFrEF, HTN, LBBB, hypothyroidism, and chronic lymphedema admitted with septic shock from UTI/pyelonephritis, resulting in ATN requiring HD.    --mentating well  --hypotension, continue low dose midodrine  continue ASA and statin  --stable respiratory status  --DUNCAN due to ATN  2nd full HD session today  --nonobstructing L hydronephrosis, no urologic intervention, continue supportive care  --nausea resolved, advance diet as tolerates  --UTI and pyelonphritis, E. coli bacteremia  continue high dose CTX until 6/14  --DM2, continue current lantus and ISS  --plan discussed with pt   --stable for floor
72 year old female with history of DM2, HFrEF, HTN, LBBB, hypothyroidism, and chronic lymphedema admitted with septic shock from UTI/pyelonephritis, resulting in ATN requiring HD.    --mentating well  --hypotension, start low dose midodrine  continue ASA and statin  --stable respiratory status  --DUNCAN due to ATN  RIJ HD catheter placed today, with plan for 1st HD session  --nonobstructing L hydronephrosis  unlikely to account for current DUNCAN  no urologic intervention  --nausea likely due to uremia, continue symptomatic treatment  clears as tolerates  --UTI and pyelonphritis, E. coli bacteremia  continue high dose CTX  --DM2 with severe hyperglycemia, continue lantus and ISS  --plan discussed with pt and family in detail at bedside
72 year old female with history of DM2, HFrEF, HTN, LBBB, hypothyroidism, and chronic lymphedema here with poor PO intake, nausea, and vomiting. Admitted to ICU for septic shock secondary to suspected UTI.    Acute renal failure, ATN  Severe sepsis with septic shock  Acute UTI  Lactic acidosis  Left hydronephrosis    NEURO: Mentation at baseline.   CVS: Vasopressors. Target MAP >65. ASA, Statin.  PULM: Respiratory status stable  GI: Unable to tolerate diet due to nausea.  RENAL: Received approx 3 L IVF. Monitor UOP.  ENDO: Insulin gtt for hyperglycemia. Levothyroxine.  ID: Ceftriaxone. Prior urine culture grew pan sensitive e. coli. Urology input noted. No plan for intervention.  PPX: HSQ for DVT PPX    Full Code

## 2023-06-08 NOTE — PROGRESS NOTE ADULT - SUBJECTIVE AND OBJECTIVE BOX
Date of Service 06-08-23 @ 16:38    Patient is a 72y old  Female who presents with a chief complaint of sepsis (08 Jun 2023 15:42)      INTERVAL /OVERNIGHT EVENTS: nausea improved    MEDICATIONS  (STANDING):  aspirin  chewable 81 milliGRAM(s) Oral daily  atorvastatin 80 milliGRAM(s) Oral at bedtime  cefTRIAXone   IVPB 2000 milliGRAM(s) IV Intermittent every 24 hours  chlorhexidine 2% Cloths 1 Application(s) Topical <User Schedule>  dextrose 50% Injectable 50 milliLiter(s) IV Push every 15 minutes  dextrose 50% Injectable 25 milliLiter(s) IV Push every 15 minutes  heparin   Injectable 7500 Unit(s) SubCutaneous every 8 hours  insulin glargine Injectable (LANTUS) 25 Unit(s) SubCutaneous every morning  insulin lispro (ADMELOG) corrective regimen sliding scale   SubCutaneous every 6 hours  levothyroxine 75 MICROGram(s) Oral daily  midodrine 5 milliGRAM(s) Oral every 8 hours  midodrine      mupirocin 2% Nasal 1 Application(s) Both Nostrils two times a day  nystatin Powder 1 Application(s) Topical two times a day  pantoprazole  Injectable 40 milliGRAM(s) IV Push daily    MEDICATIONS  (PRN):  sodium chloride 0.9% lock flush 10 milliLiter(s) IV Push every 1 hour PRN Pre/post blood products, medications, blood draw, and to maintain line patency      Allergies    Ativan (Rash; Urticaria)  penicillins (Hives)    Intolerances        REVIEW OF SYSTEMS:  CONSTITUTIONAL: No fever, weight loss, or fatigue  EYES: No eye pain, visual disturbances, or discharge  ENMT:  No difficulty hearing, tinnitus, vertigo; No sinus or throat pain  NECK: No pain or stiffness  RESPIRATORY: No cough, wheezing, chills or hemoptysis; No shortness of breath  CARDIOVASCULAR: No chest pain, palpitations, dizziness, or leg swelling  GASTROINTESTINAL: No abdominal or epigastric pain. No nausea, vomiting, or hematemesis; No diarrhea or constipation. No melena or hematochezia.  GENITOURINARY: No dysuria, frequency, hematuria, or incontinence  NEUROLOGICAL: No headaches, memory loss, loss of strength, numbness, or tremors  SKIN: No itching, burning, rashes, or lesions   LYMPH NODES: No enlarged glands  ENDOCRINE: No heat or cold intolerance; No hair loss; No polydipsia or polyuria  MUSCULOSKELETAL: No joint pain or swelling; No muscle, back, or extremity pain  PSYCHIATRIC: No depression, anxiety, mood swings, or difficulty sleeping  HEME/LYMPH: No easy bruising, or bleeding gums  ALLERGY AND IMMUNOLOGIC: No hives or eczema    Vital Signs Last 24 Hrs  T(C): 36.5 (08 Jun 2023 11:50), Max: 37.2 (07 Jun 2023 16:46)  T(F): 97.7 (08 Jun 2023 11:50), Max: 99 (07 Jun 2023 16:46)  HR: 79 (08 Jun 2023 12:00) (77 - 95)  BP: 104/50 (08 Jun 2023 12:00) (86/42 - 134/61)  BP(mean): 72 (08 Jun 2023 12:00) (60 - 90)  RR: 22 (08 Jun 2023 12:00) (16 - 22)  SpO2: 92% (08 Jun 2023 12:00) (92% - 99%)    Parameters below as of 08 Jun 2023 11:50  Patient On (Oxygen Delivery Method): room air        PHYSICAL EXAM:  GENERAL: NAD, well-groomed, well-developed  HEAD:  Atraumatic, Normocephalic  EYES: EOMI, PERRLA, conjunctiva and sclera clear  ENMT: No tonsillar erythema, exudates, or enlargement; Moist mucous membranes, Good dentition, No lesions  NECK: Supple, No JVD, Normal thyroid  NERVOUS SYSTEM:  Alert & Oriented X3, Good concentration; Motor Strength 5/5 B/L upper and lower extremities; DTRs 2+ intact and symmetric  CHEST/LUNG: Clear to auscultation bilaterally; No rales, rhonchi, wheezing, or rubs  HEART: Regular rate and rhythm; No murmurs, rubs, or gallops  ABDOMEN: Soft, Nontender, Nondistended; Bowel sounds present  EXTREMITIES:  2+ Peripheral Pulses, No clubbing, cyanosis, or edema  LYMPH: No lymphadenopathy noted  SKIN: No rashes or lesions    LABS:                        10.9   16.38 )-----------( 149      ( 08 Jun 2023 05:20 )             33.3     08 Jun 2023 05:20    134    |  101    |  96     ----------------------------<  168    3.8     |  24     |  4.10     Ca    8.2        08 Jun 2023 05:20  Phos  3.6       08 Jun 2023 05:20  Mg     2.4       08 Jun 2023 05:20    TPro  5.9    /  Alb  2.2    /  TBili  0.5    /  DBili  x      /  AST  13     /  ALT  11     /  AlkPhos  80     08 Jun 2023 05:20        CAPILLARY BLOOD GLUCOSE      POCT Blood Glucose.: 201 mg/dL (08 Jun 2023 12:22)  POCT Blood Glucose.: 174 mg/dL (08 Jun 2023 08:55)  POCT Blood Glucose.: 172 mg/dL (08 Jun 2023 05:11)  POCT Blood Glucose.: 216 mg/dL (07 Jun 2023 23:50)  POCT Blood Glucose.: 204 mg/dL (07 Jun 2023 17:16)      RADIOLOGY & ADDITIONAL TESTS:    Notes Reviewed:  [x ] YES  [ ] NO    Care Discussed with Consultants/Other Providers [x ] YES  [ ] NO

## 2023-06-08 NOTE — PROGRESS NOTE ADULT - ASSESSMENT
71yo F with PMHx IDDM2, HFrEF, HTN, hypothyroidism, LBBB, chronic lymphedema presenting with decreased appetite and multiple episodes of emesis. Recent pansensitive E.coli UTI in April treated with Bactrim. Now with L hydronephrosis noted on CT. Admitted to ICU for septic shock 2/2 UTI.    Neuro: awake and alert, no active issues    Cardio: Intermittent hypotension. Septic shock resolved. Midodrine 5mg TID. May titrate down once patient HDS and no longer requires dialysis. Continue to hold home entresto, eplerenone and metoprolol in setting of sepsis and ATN. May restart HF meds as volume status requires and BP allows. QTc 501, Avoid QTc prolonging meds.    Pulm: Saturating well on room air. Supplemental O2 prn. May require when sleeping.     GI: Diet advanced to CC/DASH diet. Tolerating well, nausea mostly resolved. Dc Tigan, may use prn upon request.     Renal: Downtrending renal indicis with daily HD. L hydronephrosis without obstructing stone noted on CT. Urology: medical management. Cont [place]za cath    ID: E. coli sepsis 2/2 UTI. Leukocytosis decreasing. Initial Blood and Urine cultures growing E. coli sensitive to Rocephin. Repeat blood cx NGTD (prelim). Continue Rocephin for 10 days total. (penicillin allergy, has tolerated Rocephin in the past).     Endo: Continue Lantus 25u daily, ISS    Heme: VTE ppx heparin subq    Dispo: Stable for transfer to floors

## 2023-06-09 DIAGNOSIS — N17.9 ACUTE KIDNEY FAILURE, UNSPECIFIED: ICD-10-CM

## 2023-06-09 LAB
ALBUMIN SERPL ELPH-MCNC: 2.1 G/DL — LOW (ref 3.3–5)
ALP SERPL-CCNC: 69 U/L — SIGNIFICANT CHANGE UP (ref 40–120)
ALT FLD-CCNC: 11 U/L — LOW (ref 12–78)
ANION GAP SERPL CALC-SCNC: 7 MMOL/L — SIGNIFICANT CHANGE UP (ref 5–17)
AST SERPL-CCNC: 13 U/L — LOW (ref 15–37)
BASOPHILS # BLD AUTO: 0 K/UL — SIGNIFICANT CHANGE UP (ref 0–0.2)
BASOPHILS NFR BLD AUTO: 0 % — SIGNIFICANT CHANGE UP (ref 0–2)
BILIRUB SERPL-MCNC: 0.6 MG/DL — SIGNIFICANT CHANGE UP (ref 0.2–1.2)
BUN SERPL-MCNC: 82 MG/DL — HIGH (ref 7–23)
CALCIUM SERPL-MCNC: 7.9 MG/DL — LOW (ref 8.5–10.1)
CHLORIDE SERPL-SCNC: 100 MMOL/L — SIGNIFICANT CHANGE UP (ref 96–108)
CO2 SERPL-SCNC: 27 MMOL/L — SIGNIFICANT CHANGE UP (ref 22–31)
CREAT SERPL-MCNC: 4.1 MG/DL — HIGH (ref 0.5–1.3)
EGFR: 11 ML/MIN/1.73M2 — LOW
EOSINOPHIL # BLD AUTO: 0.46 K/UL — SIGNIFICANT CHANGE UP (ref 0–0.5)
EOSINOPHIL NFR BLD AUTO: 3 % — SIGNIFICANT CHANGE UP (ref 0–6)
GLUCOSE SERPL-MCNC: 297 MG/DL — HIGH (ref 70–99)
HCT VFR BLD CALC: 31 % — LOW (ref 34.5–45)
HGB BLD-MCNC: 10.2 G/DL — LOW (ref 11.5–15.5)
LYMPHOCYTES # BLD AUTO: 0.46 K/UL — LOW (ref 1–3.3)
LYMPHOCYTES # BLD AUTO: 3 % — LOW (ref 13–44)
MAGNESIUM SERPL-MCNC: 2.3 MG/DL — SIGNIFICANT CHANGE UP (ref 1.6–2.6)
MCHC RBC-ENTMCNC: 29.1 PG — SIGNIFICANT CHANGE UP (ref 27–34)
MCHC RBC-ENTMCNC: 32.9 GM/DL — SIGNIFICANT CHANGE UP (ref 32–36)
MCV RBC AUTO: 88.6 FL — SIGNIFICANT CHANGE UP (ref 80–100)
MONOCYTES # BLD AUTO: 1.23 K/UL — HIGH (ref 0–0.9)
MONOCYTES NFR BLD AUTO: 8 % — SIGNIFICANT CHANGE UP (ref 2–14)
NEUTROPHILS # BLD AUTO: 12.89 K/UL — HIGH (ref 1.8–7.4)
NEUTROPHILS NFR BLD AUTO: 80 % — HIGH (ref 43–77)
NRBC # BLD: SIGNIFICANT CHANGE UP /100 WBCS (ref 0–0)
PHOSPHATE SERPL-MCNC: 3.2 MG/DL — SIGNIFICANT CHANGE UP (ref 2.5–4.5)
PLATELET # BLD AUTO: 147 K/UL — LOW (ref 150–400)
POTASSIUM SERPL-MCNC: 4.5 MMOL/L — SIGNIFICANT CHANGE UP (ref 3.5–5.3)
POTASSIUM SERPL-SCNC: 4.5 MMOL/L — SIGNIFICANT CHANGE UP (ref 3.5–5.3)
PROT SERPL-MCNC: 5.6 G/DL — LOW (ref 6–8.3)
RBC # BLD: 3.5 M/UL — LOW (ref 3.8–5.2)
RBC # FLD: 13.6 % — SIGNIFICANT CHANGE UP (ref 10.3–14.5)
SODIUM SERPL-SCNC: 134 MMOL/L — LOW (ref 135–145)
WBC # BLD: 15.34 K/UL — HIGH (ref 3.8–10.5)
WBC # FLD AUTO: 15.34 K/UL — HIGH (ref 3.8–10.5)

## 2023-06-09 RX ORDER — ALTEPLASE 100 MG
2 KIT INTRAVENOUS ONCE
Refills: 0 | Status: COMPLETED | OUTPATIENT
Start: 2023-06-09 | End: 2023-06-09

## 2023-06-09 RX ORDER — PANTOPRAZOLE SODIUM 20 MG/1
40 TABLET, DELAYED RELEASE ORAL
Refills: 0 | Status: DISCONTINUED | OUTPATIENT
Start: 2023-06-09 | End: 2023-06-19

## 2023-06-09 RX ADMIN — MUPIROCIN 1 APPLICATION(S): 20 OINTMENT TOPICAL at 06:13

## 2023-06-09 RX ADMIN — CEFTRIAXONE 100 MILLIGRAM(S): 500 INJECTION, POWDER, FOR SOLUTION INTRAMUSCULAR; INTRAVENOUS at 12:14

## 2023-06-09 RX ADMIN — HEPARIN SODIUM 7500 UNIT(S): 5000 INJECTION INTRAVENOUS; SUBCUTANEOUS at 06:12

## 2023-06-09 RX ADMIN — Medication 3: at 12:08

## 2023-06-09 RX ADMIN — MIDODRINE HYDROCHLORIDE 5 MILLIGRAM(S): 2.5 TABLET ORAL at 15:15

## 2023-06-09 RX ADMIN — Medication 3: at 17:50

## 2023-06-09 RX ADMIN — NYSTATIN CREAM 1 APPLICATION(S): 100000 CREAM TOPICAL at 06:12

## 2023-06-09 RX ADMIN — CHLORHEXIDINE GLUCONATE 1 APPLICATION(S): 213 SOLUTION TOPICAL at 06:13

## 2023-06-09 RX ADMIN — ALTEPLASE 2 MILLIGRAM(S): KIT at 20:01

## 2023-06-09 RX ADMIN — HEPARIN SODIUM 7500 UNIT(S): 5000 INJECTION INTRAVENOUS; SUBCUTANEOUS at 22:35

## 2023-06-09 RX ADMIN — INSULIN GLARGINE 25 UNIT(S): 100 INJECTION, SOLUTION SUBCUTANEOUS at 08:13

## 2023-06-09 RX ADMIN — Medication 3: at 08:12

## 2023-06-09 RX ADMIN — Medication 2: at 22:35

## 2023-06-09 RX ADMIN — NYSTATIN CREAM 1 APPLICATION(S): 100000 CREAM TOPICAL at 19:04

## 2023-06-09 RX ADMIN — Medication 75 MICROGRAM(S): at 06:11

## 2023-06-09 RX ADMIN — Medication 81 MILLIGRAM(S): at 12:11

## 2023-06-09 RX ADMIN — MIDODRINE HYDROCHLORIDE 5 MILLIGRAM(S): 2.5 TABLET ORAL at 06:11

## 2023-06-09 RX ADMIN — HEPARIN SODIUM 7500 UNIT(S): 5000 INJECTION INTRAVENOUS; SUBCUTANEOUS at 15:15

## 2023-06-09 RX ADMIN — ATORVASTATIN CALCIUM 80 MILLIGRAM(S): 80 TABLET, FILM COATED ORAL at 22:36

## 2023-06-09 RX ADMIN — MUPIROCIN 1 APPLICATION(S): 20 OINTMENT TOPICAL at 19:04

## 2023-06-09 RX ADMIN — MIDODRINE HYDROCHLORIDE 5 MILLIGRAM(S): 2.5 TABLET ORAL at 22:36

## 2023-06-09 RX ADMIN — ALTEPLASE 2 MILLIGRAM(S): KIT at 20:00

## 2023-06-09 NOTE — PROGRESS NOTE ADULT - SUBJECTIVE AND OBJECTIVE BOX
Date of Service 06-09-23 @ 11:25    Patient is a 72y old  Female who presents with a chief complaint of sepsis (08 Jun 2023 17:35)      INTERVAL /OVERNIGHT EVENTS: ? non functioning HD cath ( seen in HD and d/w HDRN )    MEDICATIONS  (STANDING):  aspirin  chewable 81 milliGRAM(s) Oral daily  atorvastatin 80 milliGRAM(s) Oral at bedtime  cefTRIAXone   IVPB 2000 milliGRAM(s) IV Intermittent every 24 hours  dextrose 5%. 1000 milliLiter(s) (50 mL/Hr) IV Continuous <Continuous>  dextrose 50% Injectable 50 milliLiter(s) IV Push every 15 minutes  dextrose 50% Injectable 25 milliLiter(s) IV Push every 15 minutes  heparin   Injectable 7500 Unit(s) SubCutaneous every 8 hours  insulin glargine Injectable (LANTUS) 25 Unit(s) SubCutaneous every morning  insulin lispro (ADMELOG) corrective regimen sliding scale   SubCutaneous at bedtime  insulin lispro (ADMELOG) corrective regimen sliding scale   SubCutaneous three times a day before meals  levothyroxine 75 MICROGram(s) Oral daily  midodrine      midodrine 5 milliGRAM(s) Oral every 8 hours  mupirocin 2% Nasal 1 Application(s) Both Nostrils two times a day  nystatin Powder 1 Application(s) Topical two times a day  pantoprazole    Tablet 40 milliGRAM(s) Oral before breakfast    MEDICATIONS  (PRN):  sodium chloride 0.9% lock flush 10 milliLiter(s) IV Push every 1 hour PRN Pre/post blood products, medications, blood draw, and to maintain line patency      Allergies    Ativan (Rash; Urticaria)  penicillins (Hives)    Intolerances        REVIEW OF SYSTEMS:  CONSTITUTIONAL: No fever, weight loss, or fatigue  EYES: No eye pain, visual disturbances, or discharge  ENMT:  No difficulty hearing, tinnitus, vertigo; No sinus or throat pain  NECK: No pain or stiffness  RESPIRATORY: No cough, wheezing, chills or hemoptysis; No shortness of breath  CARDIOVASCULAR: No chest pain, palpitations, dizziness, or leg swelling  GASTROINTESTINAL: No abdominal or epigastric pain. No nausea, vomiting, or hematemesis; No diarrhea or constipation. No melena or hematochezia.  GENITOURINARY: No dysuria, frequency, hematuria, or incontinence  NEUROLOGICAL: No headaches, memory loss, loss of strength, numbness, or tremors  SKIN: No itching, burning, rashes, or lesions   LYMPH NODES: No enlarged glands  ENDOCRINE: No heat or cold intolerance; No hair loss; No polydipsia or polyuria  MUSCULOSKELETAL: No joint pain or swelling; No muscle, back, or extremity pain  PSYCHIATRIC: No depression, anxiety, mood swings, or difficulty sleeping  HEME/LYMPH: No easy bruising, or bleeding gums  ALLERGY AND IMMUNOLOGIC: No hives or eczema    Vital Signs Last 24 Hrs  T(C): 36.5 (09 Jun 2023 09:33), Max: 37.1 (09 Jun 2023 05:43)  T(F): 97.7 (09 Jun 2023 09:33), Max: 98.8 (09 Jun 2023 05:43)  HR: 81 (09 Jun 2023 09:33) (79 - 90)  BP: 102/61 (09 Jun 2023 09:33) (96/58 - 109/63)  BP(mean): 72 (08 Jun 2023 12:00) (72 - 72)  RR: 18 (09 Jun 2023 09:33) (18 - 22)  SpO2: 93% (09 Jun 2023 09:33) (92% - 95%)    Parameters below as of 09 Jun 2023 09:33  Patient On (Oxygen Delivery Method): room air        PHYSICAL EXAM:  GENERAL: NAD, well-groomed, well-developed  HEAD:  Atraumatic, Normocephalic  EYES: EOMI, PERRLA, conjunctiva and sclera clear  ENMT: No tonsillar erythema, exudates, or enlargement; Moist mucous membranes, Good dentition, No lesions  NECK: Supple, No JVD, Normal thyroid  NERVOUS SYSTEM:  Alert & Oriented X3, Good concentration; Motor Strength 5/5 B/L upper and lower extremities; DTRs 2+ intact and symmetric  CHEST/LUNG: Clear to auscultation bilaterally; No rales, rhonchi, wheezing, or rubs  HEART: Regular rate and rhythm; No murmurs, rubs, or gallops  ABDOMEN: Soft, Nontender, Nondistended; Bowel sounds present  EXTREMITIES:  2+ Peripheral Pulses, No clubbing, cyanosis, + edema  LYMPH: No lymphadenopathy noted  SKIN: No rashes or lesions    LABS:                        10.2   15.34 )-----------( 147      ( 09 Jun 2023 05:58 )             31.0     09 Jun 2023 05:58    134    |  100    |  82     ----------------------------<  297    4.5     |  27     |  4.10     Ca    7.9        09 Jun 2023 05:58  Phos  3.2       09 Jun 2023 05:58  Mg     2.3       09 Jun 2023 05:58    TPro  5.6    /  Alb  2.1    /  TBili  0.6    /  DBili  x      /  AST  13     /  ALT  11     /  AlkPhos  69     09 Jun 2023 05:58        CAPILLARY BLOOD GLUCOSE      POCT Blood Glucose.: 267 mg/dL (09 Jun 2023 07:43)  POCT Blood Glucose.: 232 mg/dL (08 Jun 2023 21:55)  POCT Blood Glucose.: 229 mg/dL (08 Jun 2023 16:44)  POCT Blood Glucose.: 201 mg/dL (08 Jun 2023 12:22)      RADIOLOGY & ADDITIONAL TESTS:    Notes Reviewed:  [x ] YES  [ ] NO    Care Discussed with Consultants/Other Providers [x ] YES  [ ] NO

## 2023-06-09 NOTE — OCCUPATIONAL THERAPY INITIAL EVALUATION ADULT - ADL RETRAINING, OT EVAL
Patient will dress upper body with no assistance in 2-4 sessions. Patient will dress lower body with supervision, AE as needed in 2-4 sessions.

## 2023-06-09 NOTE — PROGRESS NOTE ADULT - SUBJECTIVE AND OBJECTIVE BOX
[facility], Division of Infectious Diseases  JOE Slaughter Y. Patel, S. Shah, [facility]  [phone]    Name: EVELYN LOPEZ  Age: 72y  Gender: Female  MRN: [id number]    Interval History:  Patient seen and examined at bedside  Txferred to floors  No acute overnight events.   Feeling exhausted but slightly better  Notes reviewed    Antibiotics:  cefTRIAXone   IVPB 2000 milliGRAM(s) IV Intermittent every 24 hours      Medications:  aspirin  chewable 81 milliGRAM(s) Oral daily  atorvastatin 80 milliGRAM(s) Oral at bedtime  cefTRIAXone   IVPB 2000 milliGRAM(s) IV Intermittent every 24 hours  dextrose 5%. 1000 milliLiter(s) IV Continuous <Continuous>  dextrose 50% Injectable 50 milliLiter(s) IV Push every 15 minutes  dextrose 50% Injectable 25 milliLiter(s) IV Push every 15 minutes  heparin   Injectable 7500 Unit(s) SubCutaneous every 8 hours  insulin glargine Injectable (LANTUS) 25 Unit(s) SubCutaneous every morning  insulin lispro (ADMELOG) corrective regimen sliding scale   SubCutaneous three times a day before meals  insulin lispro (ADMELOG) corrective regimen sliding scale   SubCutaneous at bedtime  levothyroxine 75 MICROGram(s) Oral daily  midodrine      midodrine 5 milliGRAM(s) Oral every 8 hours  mupirocin 2% Nasal 1 Application(s) Both Nostrils two times a day  nystatin Powder 1 Application(s) Topical two times a day  pantoprazole    Tablet 40 milliGRAM(s) Oral before breakfast  sodium chloride 0.9% lock flush 10 milliLiter(s) IV Push every 1 hour PRN      Review of Systems:  Review of systems otherwise negative except as previously noted.    Allergies: Ativan (Rash; Urticaria)  penicillins (Hives)    For details regarding the patient's past medical history, social history, family history, and other miscellaneous elements, please refer the initial infectious diseases consultation and/or the admitting history and physical examination for this admission.    Objective:  Vitals:   T(C): 36.6 (06-09-23 @ 12:15), Max: 37.1 (06-09-23 @ 05:43)  HR: 88 (06-09-23 @ 12:15) (81 - 90)  BP: 104/64 (06-09-23 @ 12:15) (96/58 - 109/63)  RR: 17 (06-09-23 @ 12:15) (17 - 18)  SpO2: 94% (06-09-23 @ 12:15) (93% - 95%)    Physical Examination:  General: no acute distress  HEENT: NC/AT, EOMI,  Cardio: S1, S2 heard, RRR, no murmurs  Resp: breath sounds heard bilaterally, no rales, wheezes or rhonchi  Abd: soft, NT, ND  Ext: no edema or cyanosis  Skin: warm, dry, no visible rash  Line: [facility]      Laboratory Studies:  CBC:                       10.2   15.34 )-----------( 147      ( 09 Jun 2023 05:58 )             31.0     CMP: 06-09    134<L>  |  100  |  82<H>  ----------------------------<  297<H>  4.5   |  27  |  4.10<H>    Ca    7.9<L>      09 Jun 2023 05:58  Phos  3.2     06-09  Mg     2.3     06-09    TPro  5.6<L>  /  Alb  2.1<L>  /  TBili  0.6  /  DBili  x   /  AST  13<L>  /  ALT  11<L>  /  AlkPhos  69  06-09    LIVER FUNCTIONS - ( 09 Jun 2023 05:58 )  Alb: 2.1 g/dL / Pro: 5.6 g/dL / ALK PHOS: 69 U/L / ALT: 11 U/L / AST: 13 U/L / GGT: x               Microbiology: reviewed    Culture - Blood (collected 06-06-23 @ 06:05)  Source: .Blood Blood-Peripheral  Preliminary Report (06-07-23 @ 09:02):    No growth to date.    Culture - Blood (collected 06-06-23 @ 06:00)  Source: .Blood Blood-Peripheral  Preliminary Report (06-07-23 @ 09:02):    No growth to date.    Culture - Urine (collected 06-04-23 @ 12:00)  Source: Clean Catch Clean Catch (Midstream)  Final Report (06-05-23 @ 15:32):    <10,000 CFU/mL Normal Urogenital Radha    Culture - Blood (collected 06-04-23 @ 01:52)  Source: .Blood Blood-Peripheral  Gram Stain (06-04-23 @ 22:53):    Growth in aerobic bottle: Gram Negative Rods    Growth in anaerobic bottle: Gram Negative Rods  Final Report (06-06-23 @ 16:36):    Growth in aerobic and anaerobic bottles: Escherichia coli    See previous culture [id number]    Culture - Blood (collected 06-04-23 @ 01:52)  Source: .Blood Blood-Peripheral  Gram Stain (06-04-23 @ 21:29):    Growth in aerobic bottle: Gram Negative Rods    Growth in anaerobic bottle: Gram Negative Rods  Final Report (06-06-23 @ 15:58):    Growth in aerobic and anaerobic bottles: Escherichia coli    ***Blood Panel PCR results on this specimen are available    approximately 3 hours after the Gram stain result.***    Gram stain, PCR, and/or culture results may not always    correspond due to difference in methodologies.    ************************************************************    This PCR assay was performed by multiplex PCR. This    Assay tests for 66 bacterial and resistance gene targets.    Please refer to the [facility] test directory    at [email or web address] for details.  Organism: Blood Culture PCR  Escherichia coli (06-06-23 @ 15:58)  Organism: Escherichia coli (06-06-23 @ 15:58)      Method Type: CATRACHITA      -  Amikacin: S <=16      -  Ampicillin: S <=8 These ampicillin results predict results for amoxicillin      -  Ampicillin/Sulbactam: S <=4/2 Enterobacter, Klebsiella aerogenes, Citrobacter, and Serratia may develop resistance during prolonged therapy (3-4 days)      -  Aztreonam: S <=4      -  Cefazolin: S <=2 Enterobacter, Klebsiella aerogenes, Citrobacter, and Serratia may develop resistance during prolonged therapy (3-4 days)      -  Cefepime: S <=2      -  Cefoxitin: S <=8      -  Ceftriaxone: S <=1 Enterobacter, Klebsiella aerogenes, Citrobacter, and Serratia may develop resistance during prolonged therapy      -  Ciprofloxacin: S <=0.25      -  Ertapenem: S <=0.5      -  Gentamicin: S <=2      -  Imipenem: S <=1      -  Levofloxacin: S <=0.5      -  Meropenem: S <=1      -  Piperacillin/Tazobactam: S <=8      -  Tobramycin: S <=2      -  Trimethoprim/Sulfamethoxazole: S <=0.5/9.5  Organism: Blood Culture PCR (06-06-23 @ 15:58)      Method Type: PCR      -  Escherichia coli: Detec          Radiology: reviewed

## 2023-06-09 NOTE — OCCUPATIONAL THERAPY INITIAL EVALUATION ADULT - PERTINENT HX OF CURRENT PROBLEM, REHAB EVAL
71 y/o female with PMH IDDM2, HFrEF, HTN, hypothyroidism, LBBB, chronic lymphedema presenting with decreased appetite and multiple episodes of emesis. Recent pansensitive E.coli UTI in April treated with Bactrim. Patient now with L hydronephrosis noted on CT. Patient started on dialysis and admitted to ICU for septic shock 2/2 UTI 6/4/23.

## 2023-06-09 NOTE — PROGRESS NOTE ADULT - ASSESSMENT
73yo F with PMHx IDDM2, HFrEF, HTN, hypothyroidism, LBBB, chronic lymphedema admitted with septic shock and Ecoli bacteremia sec Left pyelo with hydro- no obtructive uropathy on CT    Off pressors  Worsening DUNCAN started on HD  Repeat 6/6 BCx NGTD    Recommendations:  C/w ceftriaxone daily  HD per renal  Trend temps and cbc  Additional management per primary team    Covering Dr. Clark until 6/11  Infectious Diseases will continue to follow. Please call with any questions.   Felipa Rahman M.D.  [facility] Division of Infectious Diseases [phone]   71yo F with PMHx IDDM2, HFrEF, HTN, hypothyroidism, LBBB, chronic lymphedema admitted with septic shock and Ecoli bacteremia sec Left pyelo with hydro- no obtructive uropathy on CT    Off pressors  Worsening DUNCAN started on HD  Repeat 6/6 BCx NGTD    Recommendations:  C/w ceftriaxone daily  HD per renal  Trend temps and cbc  Additional management per primary team    Addendum: patient with clearance of bacteremia, 6/6 BCx now negative x72H. leukocytosis trending down  No objection to permacath placement at this time.    Covering Dr. Clark until 6/11  Infectious Diseases will continue to follow. Please call with any questions.   Felipa Rahman M.D.  [facility] Division of Infectious Diseases [phone]

## 2023-06-09 NOTE — OCCUPATIONAL THERAPY INITIAL EVALUATION ADULT - GENERAL OBSERVATIONS, REHAB EVAL
Patient found supine in bed with +IV right UE, +telemonitor, +IJ catheter right side, +De La Garza catheter.

## 2023-06-09 NOTE — CASE MANAGEMENT PROGRESS NOTE - NSCMPROGRESSNOTE_GEN_ALL_CORE
Discussed patient in interdisciplinary rounds.  Patient is transfer from ICU and is currently undergoing dialysis treatment.  Unclear if dialysis will need to be arranged on an outpatent basis.  Patient also remains with IVAX and hardy cath in place.  Will remain available from a case management perspective

## 2023-06-09 NOTE — OCCUPATIONAL THERAPY INITIAL EVALUATION ADULT - ADDITIONAL COMMENTS
Pt lives in a house with 2 steps to enter via garage and I step onto porch to enter house. Pt has a bathtub with curtain and 1 grab bar. Pt owns a tub bench, rolling walker and quad cane. Pt ambulated using a quad cane outdoors only. Pt performed sit to stand and ambulated 5' alongside EOB using bariatric rolling walker with min/CG x 1. Spouse will be available to assist pt at home prn.

## 2023-06-09 NOTE — PROGRESS NOTE ADULT - SUBJECTIVE AND OBJECTIVE BOX
Patient is a 72y old  Female who presents with a chief complaint of sepsis (04 Jun 2023 10:59)       HPI: female with history of insulin-dependent diabetes, CHF, hypertension who presented to the ED with nausea and vomiting for several days.  She has experienced no significant abdominal pain and history of multiple dark emesis and 1 episode of diarrhea today.  She denies fever or chills.  Denies URI symptoms.  She denies significant change in her urinary patters. CT performed on admission demonstrated mild left hydronephrosis without ureteral dilatation or obstructing calculus. Found to have DUNCAN and hypotension. Renal consulted for further eval. Has not seen a Nephrologist outpatient. Currently asymptomatic.      No acute events noted overnight, N/V resolved    PAST MEDICAL & SURGICAL HISTORY:  Hypertension      Diabetes      Lymphedema      H/O left bundle branch block      History of left bundle branch block (LBBB)      Systolic heart failure, chronic      H/O cataract  2020      History of surgical removal of meniscus of knee  left in 1971      Frozen shoulder  2000      H/O Achilles tendon repair  lengthened bilaterally, 2000      Fractured skull  1968         MEDICATIONS  (STANDING):  aspirin  chewable 81 milliGRAM(s) Oral daily  atorvastatin 80 milliGRAM(s) Oral at bedtime  cefTRIAXone   IVPB 2000 milliGRAM(s) IV Intermittent every 24 hours  chlorhexidine 2% Cloths 1 Application(s) Topical <User Schedule>  dextrose 50% Injectable 50 milliLiter(s) IV Push every 15 minutes  dextrose 50% Injectable 25 milliLiter(s) IV Push every 15 minutes  heparin   Injectable 7500 Unit(s) SubCutaneous every 8 hours  insulin glargine Injectable (LANTUS) 25 Unit(s) SubCutaneous every morning  insulin lispro (ADMELOG) corrective regimen sliding scale   SubCutaneous every 6 hours  levothyroxine 75 MICROGram(s) Oral daily  midodrine      midodrine 5 milliGRAM(s) Oral every 8 hours  mupirocin 2% Nasal 1 Application(s) Both Nostrils two times a day  nystatin Powder 1 Application(s) Topical two times a day  pantoprazole  Injectable 40 milliGRAM(s) IV Push daily    MEDICATIONS  (PRN):  sodium chloride 0.9% lock flush 10 milliLiter(s) IV Push every 1 hour PRN Pre/post blood products, medications, blood draw, and to maintain line patency  trimethobenzamide Injectable 200 milliGRAM(s) IntraMuscular every 8 hours PRN Nausea and/or Vomiting    FAMILY HISTORY:  Family history of CVA  mom, age 57    [place]    Social History:Non smoker        Allergies    penicillins (Hives)    Intolerances         REVIEW OF SYSTEMS:    as above      ICU Vital Signs Last 24 Hrs  T(C): 36.6 (09 Jun 2023 12:15), Max: 37.1 (09 Jun 2023 05:43)  T(F): 97.8 (09 Jun 2023 12:15), Max: 98.8 (09 Jun 2023 05:43)  HR: 88 (09 Jun 2023 12:15) (73 - 90)  BP: 110/62 (09 Jun 2023 15:15) (96/58 - 125/59)  BP(mean): --  ABP: --  ABP(mean): --  RR: 17 (09 Jun 2023 12:15) (16 - 18)  SpO2: 94% (09 Jun 2023 12:15) (93% - 98%)    O2 Parameters below as of 09 Jun 2023 12:15  Patient On (Oxygen Delivery Method): room air            PHYSICAL EXAM:    GENERAL: NAD  HEAD:  Atraumatic, Normocephalic  EYES: EOMI, conjunctiva and sclera clear  ENMT: No Drainage from nares, No drainage from ears  NECK: Supple, +dialysis catheter intact  NERVOUS SYSTEM:  Awake and Alert  CHEST/LUNG: Clear to percussion bilaterally; No rales, rhonchi, wheezing, or rubs  HEART: Regular rate and rhythm; No murmurs, rubs, or gallops  ABDOMEN: Soft, Nontender, Nondistended; Bowel sounds present, obese  EXTREMITIES:  + Edema  SKIN: No rashes No obvious ecchymosis      LABS:                                                10.2   15.34 )-----------( 147      ( 09 Jun 2023 05:58 )             31.0     06-09    134<L>  |  100  |  82<H>  ----------------------------<  297<H>  4.5   |  27  |  4.10<H>    Ca    7.9<L>      09 Jun 2023 05:58  Phos  3.2     06-09  Mg     2.3     06-09    TPro  5.6<L>  /  Alb  2.1<L>  /  TBili  0.6  /  DBili  x   /  AST  13<L>  /  ALT  11<L>  /  AlkPhos  69  06-09

## 2023-06-10 LAB
ALBUMIN SERPL ELPH-MCNC: 2.2 G/DL — LOW (ref 3.3–5)
ALP SERPL-CCNC: 76 U/L — SIGNIFICANT CHANGE UP (ref 40–120)
ALT FLD-CCNC: 9 U/L — LOW (ref 12–78)
ANION GAP SERPL CALC-SCNC: 9 MMOL/L — SIGNIFICANT CHANGE UP (ref 5–17)
APTT BLD: 29.2 SEC — SIGNIFICANT CHANGE UP (ref 27.5–35.5)
AST SERPL-CCNC: 10 U/L — LOW (ref 15–37)
BASOPHILS # BLD AUTO: 0.05 K/UL — SIGNIFICANT CHANGE UP (ref 0–0.2)
BASOPHILS NFR BLD AUTO: 0.3 % — SIGNIFICANT CHANGE UP (ref 0–2)
BILIRUB SERPL-MCNC: 0.5 MG/DL — SIGNIFICANT CHANGE UP (ref 0.2–1.2)
BUN SERPL-MCNC: 85 MG/DL — HIGH (ref 7–23)
CALCIUM SERPL-MCNC: 8 MG/DL — LOW (ref 8.5–10.1)
CHLORIDE SERPL-SCNC: 98 MMOL/L — SIGNIFICANT CHANGE UP (ref 96–108)
CO2 SERPL-SCNC: 25 MMOL/L — SIGNIFICANT CHANGE UP (ref 22–31)
CREAT SERPL-MCNC: 4.2 MG/DL — HIGH (ref 0.5–1.3)
EGFR: 11 ML/MIN/1.73M2 — LOW
EOSINOPHIL # BLD AUTO: 0.92 K/UL — HIGH (ref 0–0.5)
EOSINOPHIL NFR BLD AUTO: 4.8 % — SIGNIFICANT CHANGE UP (ref 0–6)
GLUCOSE SERPL-MCNC: 302 MG/DL — HIGH (ref 70–99)
HCT VFR BLD CALC: 30.8 % — LOW (ref 34.5–45)
HCT VFR BLD CALC: 31.2 % — LOW (ref 34.5–45)
HGB BLD-MCNC: 10.3 G/DL — LOW (ref 11.5–15.5)
HGB BLD-MCNC: 10.4 G/DL — LOW (ref 11.5–15.5)
IMM GRANULOCYTES NFR BLD AUTO: 3.7 % — HIGH (ref 0–0.9)
INR BLD: 1.27 RATIO — HIGH (ref 0.88–1.16)
LYMPHOCYTES # BLD AUTO: 1.1 K/UL — SIGNIFICANT CHANGE UP (ref 1–3.3)
LYMPHOCYTES # BLD AUTO: 5.7 % — LOW (ref 13–44)
MAGNESIUM SERPL-MCNC: 2.3 MG/DL — SIGNIFICANT CHANGE UP (ref 1.6–2.6)
MCHC RBC-ENTMCNC: 29.2 PG — SIGNIFICANT CHANGE UP (ref 27–34)
MCHC RBC-ENTMCNC: 29.8 PG — SIGNIFICANT CHANGE UP (ref 27–34)
MCHC RBC-ENTMCNC: 33 GM/DL — SIGNIFICANT CHANGE UP (ref 32–36)
MCHC RBC-ENTMCNC: 33.8 GM/DL — SIGNIFICANT CHANGE UP (ref 32–36)
MCV RBC AUTO: 88.3 FL — SIGNIFICANT CHANGE UP (ref 80–100)
MCV RBC AUTO: 88.4 FL — SIGNIFICANT CHANGE UP (ref 80–100)
MONOCYTES # BLD AUTO: 1.77 K/UL — HIGH (ref 0–0.9)
MONOCYTES NFR BLD AUTO: 9.1 % — SIGNIFICANT CHANGE UP (ref 2–14)
NEUTROPHILS # BLD AUTO: 14.79 K/UL — HIGH (ref 1.8–7.4)
NEUTROPHILS NFR BLD AUTO: 76.4 % — SIGNIFICANT CHANGE UP (ref 43–77)
NRBC # BLD: 0 /100 WBCS — SIGNIFICANT CHANGE UP (ref 0–0)
NRBC # BLD: 0 /100 WBCS — SIGNIFICANT CHANGE UP (ref 0–0)
PHOSPHATE SERPL-MCNC: 3.1 MG/DL — SIGNIFICANT CHANGE UP (ref 2.5–4.5)
PLATELET # BLD AUTO: 165 K/UL — SIGNIFICANT CHANGE UP (ref 150–400)
PLATELET # BLD AUTO: 169 K/UL — SIGNIFICANT CHANGE UP (ref 150–400)
POTASSIUM SERPL-MCNC: 4 MMOL/L — SIGNIFICANT CHANGE UP (ref 3.5–5.3)
POTASSIUM SERPL-SCNC: 4 MMOL/L — SIGNIFICANT CHANGE UP (ref 3.5–5.3)
PROT SERPL-MCNC: 5.6 G/DL — LOW (ref 6–8.3)
PROTHROM AB SERPL-ACNC: 14.9 SEC — HIGH (ref 10.5–13.4)
RBC # BLD: 3.49 M/UL — LOW (ref 3.8–5.2)
RBC # BLD: 3.53 M/UL — LOW (ref 3.8–5.2)
RBC # FLD: 13.5 % — SIGNIFICANT CHANGE UP (ref 10.3–14.5)
RBC # FLD: 13.7 % — SIGNIFICANT CHANGE UP (ref 10.3–14.5)
SODIUM SERPL-SCNC: 132 MMOL/L — LOW (ref 135–145)
WBC # BLD: 19.35 K/UL — HIGH (ref 3.8–10.5)
WBC # BLD: 20.32 K/UL — HIGH (ref 3.8–10.5)
WBC # FLD AUTO: 19.35 K/UL — HIGH (ref 3.8–10.5)
WBC # FLD AUTO: 20.32 K/UL — HIGH (ref 3.8–10.5)

## 2023-06-10 RX ADMIN — MIDODRINE HYDROCHLORIDE 5 MILLIGRAM(S): 2.5 TABLET ORAL at 21:52

## 2023-06-10 RX ADMIN — CEFTRIAXONE 100 MILLIGRAM(S): 500 INJECTION, POWDER, FOR SOLUTION INTRAMUSCULAR; INTRAVENOUS at 12:43

## 2023-06-10 RX ADMIN — NYSTATIN CREAM 1 APPLICATION(S): 100000 CREAM TOPICAL at 05:39

## 2023-06-10 RX ADMIN — INSULIN GLARGINE 25 UNIT(S): 100 INJECTION, SOLUTION SUBCUTANEOUS at 08:21

## 2023-06-10 RX ADMIN — HEPARIN SODIUM 7500 UNIT(S): 5000 INJECTION INTRAVENOUS; SUBCUTANEOUS at 05:39

## 2023-06-10 RX ADMIN — PANTOPRAZOLE SODIUM 40 MILLIGRAM(S): 20 TABLET, DELAYED RELEASE ORAL at 05:39

## 2023-06-10 RX ADMIN — Medication 1: at 21:51

## 2023-06-10 RX ADMIN — Medication 4: at 12:11

## 2023-06-10 RX ADMIN — MIDODRINE HYDROCHLORIDE 5 MILLIGRAM(S): 2.5 TABLET ORAL at 18:05

## 2023-06-10 RX ADMIN — MUPIROCIN 1 APPLICATION(S): 20 OINTMENT TOPICAL at 18:04

## 2023-06-10 RX ADMIN — MUPIROCIN 1 APPLICATION(S): 20 OINTMENT TOPICAL at 05:39

## 2023-06-10 RX ADMIN — HEPARIN SODIUM 7500 UNIT(S): 5000 INJECTION INTRAVENOUS; SUBCUTANEOUS at 21:51

## 2023-06-10 RX ADMIN — NYSTATIN CREAM 1 APPLICATION(S): 100000 CREAM TOPICAL at 18:05

## 2023-06-10 RX ADMIN — Medication 3: at 08:21

## 2023-06-10 RX ADMIN — ATORVASTATIN CALCIUM 80 MILLIGRAM(S): 80 TABLET, FILM COATED ORAL at 21:52

## 2023-06-10 RX ADMIN — HEPARIN SODIUM 7500 UNIT(S): 5000 INJECTION INTRAVENOUS; SUBCUTANEOUS at 12:42

## 2023-06-10 RX ADMIN — MIDODRINE HYDROCHLORIDE 5 MILLIGRAM(S): 2.5 TABLET ORAL at 05:39

## 2023-06-10 RX ADMIN — Medication 81 MILLIGRAM(S): at 12:42

## 2023-06-10 RX ADMIN — Medication 75 MICROGRAM(S): at 05:39

## 2023-06-10 RX ADMIN — Medication 3: at 17:46

## 2023-06-10 NOTE — PROVIDER CONTACT NOTE (OTHER) - ACTION/TREATMENT ORDERED:
stop insulin drip
indulin drip to be ordered
No new orders at this time. MD will review pt chart and consult Cardiologist.
continue insulin drip at 2units/hr
insulin 12units sliding scale given

## 2023-06-10 NOTE — PROVIDER CONTACT NOTE (OTHER) - REASON
fingerstick 403, repeat 433
fingerstick 209 at 10am
fingersticks result
fingerstick 403, repeated at 427
3.8 pause on Remoto tele per monitor tech

## 2023-06-10 NOTE — PROVIDER CONTACT NOTE (OTHER) - NAME OF MD/NP/PA/DO NOTIFIED:
Adelso Oliva -[facility]
Dr. Cr Rose
Adelso Oliva- [facility]
Dr. Smith
Olena Edouard- ICUresident
Normal vision: sees adequately in most situations; can see medication labels, newsprint

## 2023-06-10 NOTE — PROVIDER CONTACT NOTE (OTHER) - ASSESSMENT
Pt asymptomatic, pt resting comfortably in bed in low position, spontaneous chest rise and fall. VS: BP-95/59, P-77, T-98.0 oral, RR-20, O2 sat 93% RA. Pt denies any chest pain, SOB, lightheadedness, palpations.

## 2023-06-10 NOTE — PROGRESS NOTE ADULT - ASSESSMENT
Acute on CKD Stage 4  Sepsis/UTI  L Hydronephrosis  Uremia      -DUNCAN from sepsis/hypotension and renal hypoperfusion; unilateral mild hydro should not cause this degree of DUNCAN  -Abx, renal dosing  -Urology eval noted  -Monitor chemistries and urine output  -Renal indices worsened with uremic symptoms leading to dialysis; still urinating, but still documented < 1L  -Consented for dialysis  -Discussed with patient risks and benefits of dialysis in depth up to and including death, she agrees to dialysis if necessary  -Initiated dialysis 6/6/23; ICU placed access; However, due to collapsing vasculature, dialysis was not able to be done effectively and had about 20 mins on the machine. Now s/p IV albumin and IVF boluses. HD yesterday.   -Dialysis with poor catheter function per discussion with HD RN, D/w IR to schedule Permacath placement, discussed with ID and she is cleared for permacath from infection standpoint  -Catheter failed despite cath flow, only 30 minutes dialysis, but will ultimately need permacath. Awaiting IR schedule, Hopefully monday     d/w HD RN  Thank you

## 2023-06-10 NOTE — PROGRESS NOTE ADULT - SUBJECTIVE AND OBJECTIVE BOX
[facility], Division of Infectious Diseases  JOE Slaughter Y. Patel, S. Shah, G. [facility]  [phone]    Name: EVELYN LOPEZ  Age: 72y  Gender: Female  MRN: [id number]    Interval History:  Patient seen and examined at bedside this morning  No acute overnight events. Afebrile  No complaints  Little frustrated her temp cath is not working well for HD  Notes reviewed    Antibiotics:  cefTRIAXone   IVPB 2000 milliGRAM(s) IV Intermittent every 24 hours      Medications:  aspirin  chewable 81 milliGRAM(s) Oral daily  atorvastatin 80 milliGRAM(s) Oral at bedtime  cefTRIAXone   IVPB 2000 milliGRAM(s) IV Intermittent every 24 hours  dextrose 5%. 1000 milliLiter(s) IV Continuous <Continuous>  dextrose 50% Injectable 50 milliLiter(s) IV Push every 15 minutes  dextrose 50% Injectable 25 milliLiter(s) IV Push every 15 minutes  heparin   Injectable 7500 Unit(s) SubCutaneous every 8 hours  insulin glargine Injectable (LANTUS) 25 Unit(s) SubCutaneous every morning  insulin lispro (ADMELOG) corrective regimen sliding scale   SubCutaneous three times a day before meals  insulin lispro (ADMELOG) corrective regimen sliding scale   SubCutaneous at bedtime  levothyroxine 75 MICROGram(s) Oral daily  midodrine      midodrine 5 milliGRAM(s) Oral every 8 hours  mupirocin 2% Nasal 1 Application(s) Both Nostrils two times a day  nystatin Powder 1 Application(s) Topical two times a day  pantoprazole    Tablet 40 milliGRAM(s) Oral before breakfast  sodium chloride 0.9% lock flush 10 milliLiter(s) IV Push every 1 hour PRN      Review of Systems:  A 10-point review of systems was obtained.     Pertinent positives and negatives--  Constitutional: No fevers. No Chills. No Rigors.   Cardiovascular: No chest pain. No palpitations.  Respiratory: No shortness of breath. No cough.  Gastrointestinal: No nausea or vomiting. No diarrhea or constipation.   Psychiatric: Pleasant. Appropriate affect.    Review of systems otherwise negative except as previously noted.    Allergies: Ativan (Rash; Urticaria)  penicillins (Hives)    For details regarding the patient's past medical history, social history, family history, and other miscellaneous elements, please refer the initial infectious diseases consultation and/or the admitting history and physical examination for this admission.    Objective:  Vitals:   T(C): 36.4 (06-10-23 @ 12:54), Max: 36.7 (06-10-23 @ 04:18)  HR: 93 (06-10-23 @ 12:54) (77 - 93)  BP: 101/63 (06-10-23 @ 12:54) (95/59 - 163/44)  RR: 18 (06-10-23 @ 12:54) (18 - 20)  SpO2: 96% (06-10-23 @ 12:54) (93% - 99%)    Physical Examination:  General: no acute distress  HEENT: NC/AT, EOMI,  Cardio: S1, S2 heard, RRR, no murmurs  Resp: breath sounds heard bilaterally, no rales, wheezes or rhonchi  Abd: soft, NT, ND  Ext: no edema or cyanosis  Skin: warm, dry, no visible rash  Line: [facility]      Laboratory Studies:  CBC:                       10.3   19.35 )-----------( 169      ( 10 Frederic 2023 08:10 )             31.2     CMP: 06-10    132<L>  |  98  |  85<H>  ----------------------------<  302<H>  4.0   |  25  |  4.20<H>    Ca    8.0<L>      10 Frederic 2023 08:10  Phos  3.1     06-10  Mg     2.3     06-10    TPro  5.6<L>  /  Alb  2.2<L>  /  TBili  0.5  /  DBili  x   /  AST  10<L>  /  ALT  9<L>  /  AlkPhos  76  06-10    LIVER FUNCTIONS - ( 10 Frederic 2023 08:10 )  Alb: 2.2 g/dL / Pro: 5.6 g/dL / ALK PHOS: 76 U/L / ALT: 9 U/L / AST: 10 U/L / GGT: x               Microbiology: reviewed    Culture - Blood (collected 06-06-23 @ 06:05)  Source: .Blood Blood-Peripheral  Preliminary Report (06-07-23 @ 09:02):    No growth to date.    Culture - Blood (collected 06-06-23 @ 06:00)  Source: .Blood Blood-Peripheral  Preliminary Report (06-07-23 @ 09:02):    No growth to date.    Culture - Urine (collected 06-04-23 @ 12:00)  Source: Clean Catch Clean Catch (Midstream)  Final Report (06-05-23 @ 15:32):    <10,000 CFU/mL Normal Urogenital Radha    Culture - Blood (collected 06-04-23 @ 01:52)  Source: .Blood Blood-Peripheral  Gram Stain (06-04-23 @ 22:53):    Growth in aerobic bottle: Gram Negative Rods    Growth in anaerobic bottle: Gram Negative Rods  Final Report (06-06-23 @ 16:36):    Growth in aerobic and anaerobic bottles: Escherichia coli    See previous culture [id number]    Culture - Blood (collected 06-04-23 @ 01:52)  Source: .Blood Blood-Peripheral  Gram Stain (06-04-23 @ 21:29):    Growth in aerobic bottle: Gram Negative Rods    Growth in anaerobic bottle: Gram Negative Rods  Final Report (06-06-23 @ 15:58):    Growth in aerobic and anaerobic bottles: Escherichia coli    ***Blood Panel PCR results on this specimen are available    approximately 3 hours after the Gram stain result.***    Gram stain, PCR, and/or culture results may not always    correspond due to difference in methodologies.    ************************************************************    This PCR assay was performed by multiplex PCR. This    Assay tests for 66 bacterial and resistance gene targets.    Please refer to the [facility] test directory    at [email or web address] for details.  Organism: Blood Culture PCR  Escherichia coli (06-06-23 @ 15:58)  Organism: Escherichia coli (06-06-23 @ 15:58)      -  Levofloxacin: S <=0.5      -  Tobramycin: S <=2      -  Aztreonam: S <=4      -  Gentamicin: S <=2      -  Cefazolin: S <=2 Enterobacter, Klebsiella aerogenes, Citrobacter, and Serratia may develop resistance during prolonged therapy (3-4 days)      -  Cefepime: S <=2      -  Piperacillin/Tazobactam: S <=8      -  Ciprofloxacin: S <=0.25      -  Imipenem: S <=1      -  Ceftriaxone: S <=1 Enterobacter, Klebsiella aerogenes, Citrobacter, and Serratia may develop resistance during prolonged therapy      -  Ampicillin: S <=8 These ampicillin results predict results for amoxicillin      Method Type: CATRACHITA      -  Meropenem: S <=1      -  Ampicillin/Sulbactam: S <=4/2 Enterobacter, Klebsiella aerogenes, Citrobacter, and Serratia may develop resistance during prolonged therapy (3-4 days)      -  Cefoxitin: S <=8      -  Amikacin: S <=16      -  Trimethoprim/Sulfamethoxazole: S <=0.5/9.5      -  Ertapenem: S <=0.5  Organism: Blood Culture PCR (06-06-23 @ 15:58)      -  Escherichia coli: Detec      Method Type: PCR          Radiology: reviewed       [facility], Division of Infectious Diseases  JOE Slaughter Y. Patel, S. Shah, G. [facility]  [phone]    Name: EVELYN LOPEZ  Age: 72y  Gender: Female  MRN: [id number]    Interval History:  Patient seen and examined at bedside this morning  No acute overnight events. Afebrile  No complaints  Little frustrated her temp cath is not working well for HD  Notes reviewed    Antibiotics:  cefTRIAXone   IVPB 2000 milliGRAM(s) IV Intermittent every 24 hours      Medications:  aspirin  chewable 81 milliGRAM(s) Oral daily  atorvastatin 80 milliGRAM(s) Oral at bedtime  cefTRIAXone   IVPB 2000 milliGRAM(s) IV Intermittent every 24 hours  dextrose 5%. 1000 milliLiter(s) IV Continuous <Continuous>  dextrose 50% Injectable 50 milliLiter(s) IV Push every 15 minutes  dextrose 50% Injectable 25 milliLiter(s) IV Push every 15 minutes  heparin   Injectable 7500 Unit(s) SubCutaneous every 8 hours  insulin glargine Injectable (LANTUS) 25 Unit(s) SubCutaneous every morning  insulin lispro (ADMELOG) corrective regimen sliding scale   SubCutaneous three times a day before meals  insulin lispro (ADMELOG) corrective regimen sliding scale   SubCutaneous at bedtime  levothyroxine 75 MICROGram(s) Oral daily  midodrine      midodrine 5 milliGRAM(s) Oral every 8 hours  mupirocin 2% Nasal 1 Application(s) Both Nostrils two times a day  nystatin Powder 1 Application(s) Topical two times a day  pantoprazole    Tablet 40 milliGRAM(s) Oral before breakfast  sodium chloride 0.9% lock flush 10 milliLiter(s) IV Push every 1 hour PRN      Review of Systems:  A 10-point review of systems was obtained.     Pertinent positives and negatives--  Constitutional: No fevers. No Chills. No Rigors.   Cardiovascular: No chest pain. No palpitations.  Respiratory: No shortness of breath. No cough.  Gastrointestinal: No nausea or vomiting. No diarrhea or constipation.   Psychiatric: Pleasant. Appropriate affect.    Review of systems otherwise negative except as previously noted.    Allergies: Ativan (Rash; Urticaria)  penicillins (Hives)    For details regarding the patient's past medical history, social history, family history, and other miscellaneous elements, please refer the initial infectious diseases consultation and/or the admitting history and physical examination for this admission.    Objective:  Vitals:   T(C): 36.4 (06-10-23 @ 12:54), Max: 36.7 (06-10-23 @ 04:18)  HR: 93 (06-10-23 @ 12:54) (77 - 93)  BP: 101/63 (06-10-23 @ 12:54) (95/59 - 163/44)  RR: 18 (06-10-23 @ 12:54) (18 - 20)  SpO2: 96% (06-10-23 @ 12:54) (93% - 99%)    Physical Examination:  General: no acute distress  HEENT: NC/AT, EOMI,  Cardio: S1, S2 heard, RRR, no murmurs  Resp: breath sounds heard bilaterally, no rales, wheezes or rhonchi  Abd: soft, NT, ND  Ext: no edema or cyanosis  Skin: dry skin, LUE rash, contact dermatitis  Line: [facility]      Laboratory Studies:  CBC:                       10.3   19.35 )-----------( 169      ( 10 Frederic 2023 08:10 )             31.2     CMP: 06-10    132<L>  |  98  |  85<H>  ----------------------------<  302<H>  4.0   |  25  |  4.20<H>    Ca    8.0<L>      10 Frederic 2023 08:10  Phos  3.1     06-10  Mg     2.3     06-10    TPro  5.6<L>  /  Alb  2.2<L>  /  TBili  0.5  /  DBili  x   /  AST  10<L>  /  ALT  9<L>  /  AlkPhos  76  06-10    LIVER FUNCTIONS - ( 10 Frederic 2023 08:10 )  Alb: 2.2 g/dL / Pro: 5.6 g/dL / ALK PHOS: 76 U/L / ALT: 9 U/L / AST: 10 U/L / GGT: x               Microbiology: reviewed    Culture - Blood (collected 06-06-23 @ 06:05)  Source: .Blood Blood-Peripheral  Preliminary Report (06-07-23 @ 09:02):    No growth to date.    Culture - Blood (collected 06-06-23 @ 06:00)  Source: .Blood Blood-Peripheral  Preliminary Report (06-07-23 @ 09:02):    No growth to date.    Culture - Urine (collected 06-04-23 @ 12:00)  Source: Clean Catch Clean Catch (Midstream)  Final Report (06-05-23 @ 15:32):    <10,000 CFU/mL Normal Urogenital Radha    Culture - Blood (collected 06-04-23 @ 01:52)  Source: .Blood Blood-Peripheral  Gram Stain (06-04-23 @ 22:53):    Growth in aerobic bottle: Gram Negative Rods    Growth in anaerobic bottle: Gram Negative Rods  Final Report (06-06-23 @ 16:36):    Growth in aerobic and anaerobic bottles: Escherichia coli    See previous culture [id number]    Culture - Blood (collected 06-04-23 @ 01:52)  Source: .Blood Blood-Peripheral  Gram Stain (06-04-23 @ 21:29):    Growth in aerobic bottle: Gram Negative Rods    Growth in anaerobic bottle: Gram Negative Rods  Final Report (06-06-23 @ 15:58):    Growth in aerobic and anaerobic bottles: Escherichia coli    ***Blood Panel PCR results on this specimen are available    approximately 3 hours after the Gram stain result.***    Gram stain, PCR, and/or culture results may not always    correspond due to difference in methodologies.    ************************************************************    This PCR assay was performed by multiplex PCR. This    Assay tests for 66 bacterial and resistance gene targets.    Please refer to the [facility] test directory    at [email or web address] for details.  Organism: Blood Culture PCR  Escherichia coli (06-06-23 @ 15:58)  Organism: Escherichia coli (06-06-23 @ 15:58)      -  Levofloxacin: S <=0.5      -  Tobramycin: S <=2      -  Aztreonam: S <=4      -  Gentamicin: S <=2      -  Cefazolin: S <=2 Enterobacter, Klebsiella aerogenes, Citrobacter, and Serratia may develop resistance during prolonged therapy (3-4 days)      -  Cefepime: S <=2      -  Piperacillin/Tazobactam: S <=8      -  Ciprofloxacin: S <=0.25      -  Imipenem: S <=1      -  Ceftriaxone: S <=1 Enterobacter, Klebsiella aerogenes, Citrobacter, and Serratia may develop resistance during prolonged therapy      -  Ampicillin: S <=8 These ampicillin results predict results for amoxicillin      Method Type: CATRACHITA      -  Meropenem: S <=1      -  Ampicillin/Sulbactam: S <=4/2 Enterobacter, Klebsiella aerogenes, Citrobacter, and Serratia may develop resistance during prolonged therapy (3-4 days)      -  Cefoxitin: S <=8      -  Amikacin: S <=16      -  Trimethoprim/Sulfamethoxazole: S <=0.5/9.5      -  Ertapenem: S <=0.5  Organism: Blood Culture PCR (06-06-23 @ 15:58)      -  Escherichia coli: Detec      Method Type: PCR          Radiology: reviewed

## 2023-06-10 NOTE — CHART NOTE - NSCHARTNOTEFT_GEN_A_CORE
Called by RN as patient with ~3 second pause. Patient asymptomatic, VS notable for low stable BP. AM Mag/phos ordered. Day team to consider cardiology consultation if additional telemetry events or if necessary. RN to call with changes.

## 2023-06-10 NOTE — PROGRESS NOTE ADULT - ASSESSMENT
71yo F with PMHx IDDM2, HFrEF, HTN, hypothyroidism, LBBB, chronic lymphedema admitted with septic shock and Ecoli bacteremia sec Left pyelo with hydro- no obtructive uropathy on CT    Off pressors  Worsening DUNCAN started on HD  Repeat 6/6 BCx NGTD    Recommendations:  C/w ceftriaxone daily  HD per renal  Trend temps and cbc  Additional management per primary team    Called for permacath clearance yesterday, however WBC back up again  --pt otherwise clinically improved, would c/w monitoring  --repeat CBC ordered  --if continues to uptrend, would repeat BCx prior new line placement    Covering Dr. Clark until 6/11  Infectious Diseases will continue to follow. Please call with any questions.   Felipa Rahman M.D.  [facility] Division of Infectious Diseases [phone]

## 2023-06-10 NOTE — PROGRESS NOTE ADULT - SUBJECTIVE AND OBJECTIVE BOX
Date of Service 06-10-23 @ 15:08    Patient is a 72y old  Female who presents with a chief complaint of sepsis (10 Frederic 2023 14:41)      INTERVAL /OVERNIGHT EVENTS: feels better    MEDICATIONS  (STANDING):  aspirin  chewable 81 milliGRAM(s) Oral daily  atorvastatin 80 milliGRAM(s) Oral at bedtime  cefTRIAXone   IVPB 2000 milliGRAM(s) IV Intermittent every 24 hours  dextrose 5%. 1000 milliLiter(s) (50 mL/Hr) IV Continuous <Continuous>  dextrose 50% Injectable 50 milliLiter(s) IV Push every 15 minutes  dextrose 50% Injectable 25 milliLiter(s) IV Push every 15 minutes  heparin   Injectable 7500 Unit(s) SubCutaneous every 8 hours  insulin glargine Injectable (LANTUS) 25 Unit(s) SubCutaneous every morning  insulin lispro (ADMELOG) corrective regimen sliding scale   SubCutaneous three times a day before meals  insulin lispro (ADMELOG) corrective regimen sliding scale   SubCutaneous at bedtime  levothyroxine 75 MICROGram(s) Oral daily  midodrine      midodrine 5 milliGRAM(s) Oral every 8 hours  mupirocin 2% Nasal 1 Application(s) Both Nostrils two times a day  nystatin Powder 1 Application(s) Topical two times a day  pantoprazole    Tablet 40 milliGRAM(s) Oral before breakfast    MEDICATIONS  (PRN):  sodium chloride 0.9% lock flush 10 milliLiter(s) IV Push every 1 hour PRN Pre/post blood products, medications, blood draw, and to maintain line patency      Allergies    Ativan (Rash; Urticaria)  penicillins (Hives)    Intolerances        REVIEW OF SYSTEMS:  CONSTITUTIONAL: No fever, weight loss, or fatigue  EYES: No eye pain, visual disturbances, or discharge  ENMT:  No difficulty hearing, tinnitus, vertigo; No sinus or throat pain  NECK: No pain or stiffness  RESPIRATORY: No cough, wheezing, chills or hemoptysis; No shortness of breath  CARDIOVASCULAR: No chest pain, palpitations, dizziness, or leg swelling  GASTROINTESTINAL: No abdominal or epigastric pain. No nausea, vomiting, or hematemesis; No diarrhea or constipation. No melena or hematochezia.  GENITOURINARY: No dysuria, frequency, hematuria, or incontinence  NEUROLOGICAL: No headaches, memory loss, loss of strength, numbness, or tremors  SKIN: No itching, burning, rashes, or lesions   LYMPH NODES: No enlarged glands  ENDOCRINE: No heat or cold intolerance; No hair loss; No polydipsia or polyuria  MUSCULOSKELETAL: No joint pain or swelling; No muscle, back, or extremity pain  PSYCHIATRIC: No depression, anxiety, mood swings, or difficulty sleeping  HEME/LYMPH: No easy bruising, or bleeding gums  ALLERGY AND IMMUNOLOGIC: No hives or eczema    Vital Signs Last 24 Hrs  T(C): 36.4 (10 Frederic 2023 12:54), Max: 36.7 (10 Frederic 2023 04:18)  T(F): 97.5 (10 Frederic 2023 12:54), Max: 98 (10 Frederic 2023 04:18)  HR: 93 (10 Frederic 2023 12:54) (77 - 93)  BP: 101/63 (10 Frederic 2023 12:54) (95/59 - 163/44)  BP(mean): --  RR: 18 (10 Frederic 2023 12:54) (18 - 20)  SpO2: 96% (10 Frederic 2023 12:54) (93% - 99%)    Parameters below as of 10 Frederic 2023 12:54  Patient On (Oxygen Delivery Method): room air        PHYSICAL EXAM:  GENERAL: NAD, well-groomed, well-developed  HEAD:  Atraumatic, Normocephalic  EYES: EOMI, PERRLA, conjunctiva and sclera clear  ENMT: No tonsillar erythema, exudates, or enlargement; Moist mucous membranes, Good dentition, No lesions  NECK: Supple, No JVD, Normal thyroid  NERVOUS SYSTEM:  Alert & Oriented X3, Good concentration; Motor Strength 5/5 B/L upper and lower extremities; DTRs 2+ intact and symmetric  CHEST/LUNG: Clear to auscultation bilaterally; No rales, rhonchi, wheezing, or rubs  HEART: Regular rate and rhythm; No murmurs, rubs, or gallops  ABDOMEN: Soft, Nontender, Nondistended; Bowel sounds present  EXTREMITIES:  2+ Peripheral Pulses, No clubbing, cyanosis, or edema  LYMPH: No lymphadenopathy noted  SKIN: No rashes or lesions    LABS:                        10.3   19.35 )-----------( 169      ( 10 Frederic 2023 08:10 )             31.2     10 Frederic 2023 08:10    132    |  98     |  85     ----------------------------<  302    4.0     |  25     |  4.20     Ca    8.0        10 Frederic 2023 08:10  Phos  3.1       10 Frederic 2023 08:10  Mg     2.3       10 Frederic 2023 08:10    TPro  5.6    /  Alb  2.2    /  TBili  0.5    /  DBili  x      /  AST  10     /  ALT  9      /  AlkPhos  76     10 Frederic 2023 08:10    PT/INR - ( 10 Frederic 2023 08:10 )   PT: 14.9 sec;   INR: 1.27 ratio         PTT - ( 10 Frederic 2023 08:10 )  PTT:29.2 sec    CAPILLARY BLOOD GLUCOSE      POCT Blood Glucose.: 316 mg/dL (10 Frederic 2023 11:59)  POCT Blood Glucose.: 285 mg/dL (10 Frederic 2023 07:35)  POCT Blood Glucose.: 346 mg/dL (09 Jun 2023 22:32)  POCT Blood Glucose.: 285 mg/dL (09 Jun 2023 16:58)      RADIOLOGY & ADDITIONAL TESTS:    Notes Reviewed:  [x ] YES  [ ] NO    Care Discussed with Consultants/Other Providers [x ] YES  [ ] NO

## 2023-06-10 NOTE — PROGRESS NOTE ADULT - SUBJECTIVE AND OBJECTIVE BOX
Patient is a 72y old  Female who presents with a chief complaint of sepsis (04 Jun 2023 10:59)       HPI: female with history of insulin-dependent diabetes, CHF, hypertension who presented to the ED with nausea and vomiting for several days.  She has experienced no significant abdominal pain and history of multiple dark emesis and 1 episode of diarrhea today.  She denies fever or chills.  Denies URI symptoms.  She denies significant change in her urinary patters. CT performed on admission demonstrated mild left hydronephrosis without ureteral dilatation or obstructing calculus. Found to have DUNCAN and hypotension. Renal consulted for further eval. Has not seen a Nephrologist outpatient. Currently asymptomatic.      No acute events noted overnight, N/V resolved    PAST MEDICAL & SURGICAL HISTORY:  Hypertension      Diabetes      Lymphedema      H/O left bundle branch block      History of left bundle branch block (LBBB)      Systolic heart failure, chronic      H/O cataract  2020      History of surgical removal of meniscus of knee  left in 1971      Frozen shoulder  2000      H/O Achilles tendon repair  lengthened bilaterally, 2000      Fractured skull  1968         MEDICATIONS  (STANDING):  aspirin  chewable 81 milliGRAM(s) Oral daily  atorvastatin 80 milliGRAM(s) Oral at bedtime  cefTRIAXone   IVPB 2000 milliGRAM(s) IV Intermittent every 24 hours  chlorhexidine 2% Cloths 1 Application(s) Topical <User Schedule>  dextrose 50% Injectable 50 milliLiter(s) IV Push every 15 minutes  dextrose 50% Injectable 25 milliLiter(s) IV Push every 15 minutes  heparin   Injectable 7500 Unit(s) SubCutaneous every 8 hours  insulin glargine Injectable (LANTUS) 25 Unit(s) SubCutaneous every morning  insulin lispro (ADMELOG) corrective regimen sliding scale   SubCutaneous every 6 hours  levothyroxine 75 MICROGram(s) Oral daily  midodrine      midodrine 5 milliGRAM(s) Oral every 8 hours  mupirocin 2% Nasal 1 Application(s) Both Nostrils two times a day  nystatin Powder 1 Application(s) Topical two times a day  pantoprazole  Injectable 40 milliGRAM(s) IV Push daily    MEDICATIONS  (PRN):  sodium chloride 0.9% lock flush 10 milliLiter(s) IV Push every 1 hour PRN Pre/post blood products, medications, blood draw, and to maintain line patency  trimethobenzamide Injectable 200 milliGRAM(s) IntraMuscular every 8 hours PRN Nausea and/or Vomiting    FAMILY HISTORY:  Family history of CVA  mom, age 57    [place]    Social History:Non smoker        Allergies    penicillins (Hives)    Intolerances         REVIEW OF SYSTEMS:    as above    ICU Vital Signs Last 24 Hrs  T(C): 36.7 (10 Frederic 2023 04:18), Max: 36.7 (10 Frederic 2023 04:18)  T(F): 98 (10 Frederic 2023 04:18), Max: 98 (10 Frederic 2023 04:18)  HR: 77 (10 Frederic 2023 04:18) (77 - 88)  BP: 101/59 (10 Frederic 2023 05:00) (95/59 - 110/62)  BP(mean): --  ABP: --  ABP(mean): --  RR: 20 (10 Frederic 2023 04:18) (17 - 20)  SpO2: 97% (10 Frederic 2023 05:00) (93% - 97%)    O2 Parameters below as of 10 Frederic 2023 05:00  Patient On (Oxygen Delivery Method): room air        PHYSICAL EXAM:    GENERAL: NAD  HEAD:  Atraumatic, Normocephalic  EYES: EOMI, conjunctiva and sclera clear  ENMT: No Drainage from nares, No drainage from ears  NECK: Supple, +dialysis catheter intact  NERVOUS SYSTEM:  Awake and Alert  CHEST/LUNG: Clear to percussion bilaterally; No rales, rhonchi, wheezing, or rubs  HEART: Regular rate and rhythm; No murmurs, rubs, or gallops  ABDOMEN: Soft, Nontender, Nondistended; Bowel sounds present, obese  EXTREMITIES:  + Edema  SKIN: No rashes No obvious ecchymosis      LABS:                                     10.3   19.35 )-----------( 169      ( 10 Frederic 2023 08:10 )             31.2     06-10    132<L>  |  98  |  85<H>  ----------------------------<  302<H>  4.0   |  25  |  4.20<H>    Ca    8.0<L>      10 Frederic 2023 08:10  Phos  3.1     06-10  Mg     2.3     06-10    TPro  5.6<L>  /  Alb  2.2<L>  /  TBili  0.5  /  DBili  x   /  AST  10<L>  /  ALT  9<L>  /  AlkPhos  76  06-10    PT/INR - ( 10 Frederic 2023 08:10 )   PT: 14.9 sec;   INR: 1.27 ratio         PTT - ( 10 Frederic 2023 08:10 )  PTT:29.2 sec

## 2023-06-11 LAB
CULTURE RESULTS: SIGNIFICANT CHANGE UP
CULTURE RESULTS: SIGNIFICANT CHANGE UP
SPECIMEN SOURCE: SIGNIFICANT CHANGE UP
SPECIMEN SOURCE: SIGNIFICANT CHANGE UP

## 2023-06-11 RX ADMIN — Medication 75 MICROGRAM(S): at 06:21

## 2023-06-11 RX ADMIN — ATORVASTATIN CALCIUM 80 MILLIGRAM(S): 80 TABLET, FILM COATED ORAL at 22:48

## 2023-06-11 RX ADMIN — Medication 4: at 12:04

## 2023-06-11 RX ADMIN — Medication 3: at 17:22

## 2023-06-11 RX ADMIN — NYSTATIN CREAM 1 APPLICATION(S): 100000 CREAM TOPICAL at 17:24

## 2023-06-11 RX ADMIN — Medication 3: at 08:08

## 2023-06-11 RX ADMIN — MIDODRINE HYDROCHLORIDE 5 MILLIGRAM(S): 2.5 TABLET ORAL at 06:21

## 2023-06-11 RX ADMIN — HEPARIN SODIUM 7500 UNIT(S): 5000 INJECTION INTRAVENOUS; SUBCUTANEOUS at 22:48

## 2023-06-11 RX ADMIN — Medication 81 MILLIGRAM(S): at 12:05

## 2023-06-11 RX ADMIN — MIDODRINE HYDROCHLORIDE 5 MILLIGRAM(S): 2.5 TABLET ORAL at 22:48

## 2023-06-11 RX ADMIN — CEFTRIAXONE 100 MILLIGRAM(S): 500 INJECTION, POWDER, FOR SOLUTION INTRAMUSCULAR; INTRAVENOUS at 12:05

## 2023-06-11 RX ADMIN — INSULIN GLARGINE 25 UNIT(S): 100 INJECTION, SOLUTION SUBCUTANEOUS at 08:09

## 2023-06-11 RX ADMIN — Medication 1: at 22:48

## 2023-06-11 RX ADMIN — NYSTATIN CREAM 1 APPLICATION(S): 100000 CREAM TOPICAL at 06:20

## 2023-06-11 RX ADMIN — MIDODRINE HYDROCHLORIDE 5 MILLIGRAM(S): 2.5 TABLET ORAL at 14:26

## 2023-06-11 RX ADMIN — PANTOPRAZOLE SODIUM 40 MILLIGRAM(S): 20 TABLET, DELAYED RELEASE ORAL at 06:21

## 2023-06-11 RX ADMIN — MUPIROCIN 1 APPLICATION(S): 20 OINTMENT TOPICAL at 06:20

## 2023-06-11 RX ADMIN — HEPARIN SODIUM 7500 UNIT(S): 5000 INJECTION INTRAVENOUS; SUBCUTANEOUS at 14:27

## 2023-06-11 RX ADMIN — HEPARIN SODIUM 7500 UNIT(S): 5000 INJECTION INTRAVENOUS; SUBCUTANEOUS at 06:21

## 2023-06-11 NOTE — PROGRESS NOTE ADULT - SUBJECTIVE AND OBJECTIVE BOX
Patient is a 72y old  Female who presents with a chief complaint of sepsis (04 Jun 2023 10:59)       HPI: female with history of insulin-dependent diabetes, CHF, hypertension who presented to the ED with nausea and vomiting for several days.  She has experienced no significant abdominal pain and history of multiple dark emesis and 1 episode of diarrhea today.  She denies fever or chills.  Denies URI symptoms.  She denies significant change in her urinary patters. CT performed on admission demonstrated mild left hydronephrosis without ureteral dilatation or obstructing calculus. Found to have DUNCAN and hypotension. Renal consulted for further eval. Has not seen a Nephrologist outpatient. Currently asymptomatic.      No acute events noted overnight, N/V resolved. Having some itching    PAST MEDICAL & SURGICAL HISTORY:  Hypertension      Diabetes      Lymphedema      H/O left bundle branch block      History of left bundle branch block (LBBB)      Systolic heart failure, chronic      H/O cataract  2020      History of surgical removal of meniscus of knee  left in 1971      Frozen shoulder  2000      H/O Achilles tendon repair  lengthened bilaterally, 2000      Fractured skull  1968         MEDICATIONS  (STANDING):  aspirin  chewable 81 milliGRAM(s) Oral daily  atorvastatin 80 milliGRAM(s) Oral at bedtime  cefTRIAXone   IVPB 2000 milliGRAM(s) IV Intermittent every 24 hours  chlorhexidine 2% Cloths 1 Application(s) Topical <User Schedule>  dextrose 50% Injectable 50 milliLiter(s) IV Push every 15 minutes  dextrose 50% Injectable 25 milliLiter(s) IV Push every 15 minutes  heparin   Injectable 7500 Unit(s) SubCutaneous every 8 hours  insulin glargine Injectable (LANTUS) 25 Unit(s) SubCutaneous every morning  insulin lispro (ADMELOG) corrective regimen sliding scale   SubCutaneous every 6 hours  levothyroxine 75 MICROGram(s) Oral daily  midodrine      midodrine 5 milliGRAM(s) Oral every 8 hours  mupirocin 2% Nasal 1 Application(s) Both Nostrils two times a day  nystatin Powder 1 Application(s) Topical two times a day  pantoprazole  Injectable 40 milliGRAM(s) IV Push daily    MEDICATIONS  (PRN):  sodium chloride 0.9% lock flush 10 milliLiter(s) IV Push every 1 hour PRN Pre/post blood products, medications, blood draw, and to maintain line patency  trimethobenzamide Injectable 200 milliGRAM(s) IntraMuscular every 8 hours PRN Nausea and/or Vomiting    FAMILY HISTORY:  Family history of CVA  mom, age 57    [place]    Social History:Non smoker        Allergies    penicillins (Hives)    Intolerances         REVIEW OF SYSTEMS:    as above    ICU Vital Signs Last 24 Hrs  T(C): 36.7 (11 Jun 2023 05:21), Max: 36.7 (11 Jun 2023 05:21)  T(F): 98 (11 Jun 2023 05:21), Max: 98 (11 Jun 2023 05:21)  HR: 78 (11 Jun 2023 05:21) (78 - 93)  BP: 112/63 (11 Jun 2023 05:21) (90/55 - 112/63)  BP(mean): --  ABP: --  ABP(mean): --  RR: 19 (11 Jun 2023 05:21) (18 - 19)  SpO2: 97% (11 Jun 2023 05:21) (92% - 97%)    O2 Parameters below as of 11 Jun 2023 05:21  Patient On (Oxygen Delivery Method): room air          PHYSICAL EXAM:    GENERAL: NAD  HEAD:  Atraumatic, Normocephalic  EYES: EOMI, conjunctiva and sclera clear  ENMT: No Drainage from nares, No drainage from ears  NECK: Supple, +dialysis catheter intact  NERVOUS SYSTEM:  Awake and Alert  CHEST/LUNG: Clear to percussion bilaterally; No rales, rhonchi, wheezing, or rubs  HEART: Regular rate and rhythm; No murmurs, rubs, or gallops  ABDOMEN: Soft, Nontender, Nondistended; Bowel sounds present, obese  EXTREMITIES:  + Edema  SKIN: No rashes No obvious ecchymosis      LABS:                                     10.4   20.32 )-----------( 165      ( 10 Frederic 2023 15:31 )             30.8     06-10    132<L>  |  98  |  85<H>  ----------------------------<  302<H>  4.0   |  25  |  4.20<H>    Ca    8.0<L>      10 Frederic 2023 08:10  Phos  3.1     06-10  Mg     2.3     06-10    TPro  5.6<L>  /  Alb  2.2<L>  /  TBili  0.5  /  DBili  x   /  AST  10<L>  /  ALT  9<L>  /  AlkPhos  76  06-10    PT/INR - ( 10 Frederic 2023 08:10 )   PT: 14.9 sec;   INR: 1.27 ratio         PTT - ( 10 Frederic 2023 08:10 )  PTT:29.2 sec

## 2023-06-11 NOTE — PROGRESS NOTE ADULT - ASSESSMENT
73yo F with PMHx IDDM2, HFrEF, HTN, hypothyroidism, LBBB, chronic lymphedema admitted with septic shock and Ecoli bacteremia sec Left pyelo with hydro- no obtructive uropathy on CT    Off pressors  Worsening DUNCAN started on HD  Repeat 6/6 BCx NGTD    Recommendations:  C/w ceftriaxone daily  HD per renal  Trend temps and cbc  Additional management per primary team    WBC continues to uptrend  --hold off on permacath placement at this time  --sending repeat BCx prior new line placement    D/w Dr. Grace Clark to resume care in   Infectious Diseases will continue to follow. Please call with any questions.   Felipa Rahman M.D.  [facility] of Infect[facility] [phone]

## 2023-06-11 NOTE — PROGRESS NOTE ADULT - SUBJECTIVE AND OBJECTIVE BOX
[facility], Division of Infectious Diseases  JOE Slaughter Y. Patel, S. Shah, G. [facility]  [phone]    Name: EVELYN LOPEZ  Age: 72y  Gender: Female  MRN: [id number]    Interval History:  Patient seen and examined at bedside this morning  No acute overnight events. Afebrile  No complaints  Notes reviewed  Reviewed rash w/ pt; pt w/ hx of eczema and now w/ contact dermatitis 2/2 tape on LUE    Antibiotics:  cefTRIAXone   IVPB 2000 milliGRAM(s) IV Intermittent every 24 hours      Medications:  aspirin  chewable 81 milliGRAM(s) Oral daily  atorvastatin 80 milliGRAM(s) Oral at bedtime  cefTRIAXone   IVPB 2000 milliGRAM(s) IV Intermittent every 24 hours  dextrose 5%. 1000 milliLiter(s) IV Continuous <Continuous>  dextrose 50% Injectable 50 milliLiter(s) IV Push every 15 minutes  dextrose 50% Injectable 25 milliLiter(s) IV Push every 15 minutes  heparin   Injectable 7500 Unit(s) SubCutaneous every 8 hours  insulin glargine Injectable (LANTUS) 25 Unit(s) SubCutaneous every morning  insulin lispro (ADMELOG) corrective regimen sliding scale   SubCutaneous three times a day before meals  insulin lispro (ADMELOG) corrective regimen sliding scale   SubCutaneous at bedtime  levothyroxine 75 MICROGram(s) Oral daily  midodrine      midodrine 5 milliGRAM(s) Oral every 8 hours  nystatin Powder 1 Application(s) Topical two times a day  pantoprazole    Tablet 40 milliGRAM(s) Oral before breakfast  sodium chloride 0.9% lock flush 10 milliLiter(s) IV Push every 1 hour PRN      Review of Systems:  A 10-point review of systems was obtained.     Pertinent positives and negatives--  Constitutional: No fevers. No Chills. No Rigors.   Cardiovascular: No chest pain. No palpitations.  Respiratory: No shortness of breath. No cough.  Gastrointestinal: No nausea or vomiting. No diarrhea or constipation.   Psychiatric: Pleasant. Appropriate affect.    Review of systems otherwise negative except as previously noted.    Allergies: Ativan (Rash; Urticaria)  penicillins (Hives)    For details regarding the patient's past medical history, social history, family history, and other miscellaneous elements, please refer the initial infectious diseases consultation and/or the admitting history and physical examination for this admission.    Objective:  Vitals:   T(C): 36.7 (06-11-23 @ 05:21), Max: 36.7 (06-11-23 @ 05:21)  HR: 78 (06-11-23 @ 05:21) (78 - 85)  BP: 112/63 (06-11-23 @ 05:21) (90/55 - 112/63)  RR: 19 (06-11-23 @ 05:21) (19 - 19)  SpO2: 97% (06-11-23 @ 05:21) (92% - 97%)    Physical Examination:  General: no acute distress  HEENT: NC/AT, EOMI,  Cardio: S1, S2 heard, RRR, no murmurs  Resp: breath sounds heard bilaterally, no rales, wheezes or rhonchi  Abd: soft, NT, ND  Ext: no edema or cyanosis  Skin: dry skin, LUE rash, contact dermatitis  Line: [facility]    Laboratory Studies:  CBC:                       10.4   20.32 )-----------( 165      ( 10 Frederic 2023 15:31 )             30.8     CMP: 06-10    132<L>  |  98  |  85<H>  ----------------------------<  302<H>  4.0   |  25  |  4.20<H>    Ca    8.0<L>      10 Frederic 2023 08:10  Phos  3.1     06-10  Mg     2.3     06-10    TPro  5.6<L>  /  Alb  2.2<L>  /  TBili  0.5  /  DBili  x   /  AST  10<L>  /  ALT  9<L>  /  AlkPhos  76  06-10    LIVER FUNCTIONS - ( 10 Frederic 2023 08:10 )  Alb: 2.2 g/dL / Pro: 5.6 g/dL / ALK PHOS: 76 U/L / ALT: 9 U/L / AST: 10 U/L / GGT: x               Microbiology: reviewed    Culture - Blood (collected 06-06-23 @ 06:05)  Source: .Blood Blood-Peripheral  Final Report (06-11-23 @ 09:00):    No Growth Final    Culture - Blood (collected 06-06-23 @ 06:00)  Source: .Blood Blood-Peripheral  Final Report (06-11-23 @ 09:01):    No Growth Final    Culture - Urine (collected 06-04-23 @ 12:00)  Source: Clean Catch Clean Catch (Midstream)  Final Report (06-05-23 @ 15:32):    <10,000 CFU/mL Normal Urogenital Radha    Culture - Blood (collected 06-04-23 @ 01:52)  Source: .Blood Blood-Peripheral  Gram Stain (06-04-23 @ 22:53):    Growth in aerobic bottle: Gram Negative Rods    Growth in anaerobic bottle: Gram Negative Rods  Final Report (06-06-23 @ 16:36):    Growth in aerobic and anaerobic bottles: Escherichia coli    See previous culture [id number]    Culture - Blood (collected 06-04-23 @ 01:52)  Source: .Blood Blood-Peripheral  Gram Stain (06-04-23 @ 21:29):    Growth in aerobic bottle: Gram Negative Rods    Growth in anaerobic bottle: Gram Negative Rods  Final Report (06-06-23 @ 15:58):    Growth in aerobic and anaerobic bottles: Escherichia coli    ***Blood Panel PCR results on this specimen are available    approximately 3 hours after the Gram stain result.***    Gram stain, PCR, and/or culture results may not always    correspond due to difference in methodologies.    ************************************************************    This PCR assay was performed by multiplex PCR. This    Assay tests for 66 bacterial and resistance gene targets.    Please refer to the [facility] test directory    at [email or web address] for details.  Organism: Blood Culture PCR  Escherichia coli (06-06-23 @ 15:58)  Organism: Escherichia coli (06-06-23 @ 15:58)      Method Type: CATRACHITA      -  Amikacin: S <=16      -  Ampicillin: S <=8 These ampicillin results predict results for amoxicillin      -  Ampicillin/Sulbactam: S <=4/2 Enterobacter, Klebsiella aerogenes, Citrobacter, and Serratia may develop resistance during prolonged therapy (3-4 days)      -  Aztreonam: S <=4      -  Cefazolin: S <=2 Enterobacter, Klebsiella aerogenes, Citrobacter, and Serratia may develop resistance during prolonged therapy (3-4 days)      -  Cefepime: S <=2      -  Cefoxitin: S <=8      -  Ceftriaxone: S <=1 Enterobacter, Klebsiella aerogenes, Citrobacter, and Serratia may develop resistance during prolonged therapy      -  Ciprofloxacin: S <=0.25      -  Ertapenem: S <=0.5      -  Gentamicin: S <=2      -  Imipenem: S <=1      -  Levofloxacin: S <=0.5      -  Meropenem: S <=1      -  Piperacillin/Tazobactam: S <=8      -  Tobramycin: S <=2      -  Trimethoprim/Sulfamethoxazole: S <=0.5/9.5  Organism: Blood Culture PCR (06-06-23 @ 15:58)      Method Type: PCR      -  Escherichia coli: Detec          Radiology: reviewed

## 2023-06-11 NOTE — PROGRESS NOTE ADULT - ASSESSMENT
Acute on CKD Stage 4  Sepsis/UTI  L Hydronephrosis  Uremia      -DUNCAN from sepsis/hypotension and renal hypoperfusion; unilateral mild hydro should not cause this degree of DUNCAN  -Abx, renal dosing  -Urology eval noted  -Monitor chemistries and urine output  -Renal indices worsened with uremic symptoms leading to dialysis; still urinating, but still documented < 1L  -Consented for dialysis  -Discussed with patient risks and benefits of dialysis in depth up to and including death, she agrees to dialysis if necessary  -Initiated dialysis 6/6/23; ICU placed access; However, due to collapsing vasculature, dialysis was not able to be done effectively and had about 20 mins on the machine. Now s/p IV albumin and IVF boluses. HD yesterday.   -Catheter failed despite cath flow, only 30 minutes dialysis, but will ultimately need permacath, however with WBC increasing, will hold off on permacath, but will need new temp cath, can be done with IR.       d/w ID  Thank you

## 2023-06-11 NOTE — PROGRESS NOTE ADULT - SUBJECTIVE AND OBJECTIVE BOX
Patient is a 72y old  Female who presents with a chief complaint of sepsis (11 Jun 2023 12:56)      INTERVAL OVER NIGHT EVENTS:    MEDICATIONS  (STANDING):  aspirin  chewable 81 milliGRAM(s) Oral daily  atorvastatin 80 milliGRAM(s) Oral at bedtime  cefTRIAXone   IVPB 2000 milliGRAM(s) IV Intermittent every 24 hours  dextrose 5%. 1000 milliLiter(s) (50 mL/Hr) IV Continuous <Continuous>  dextrose 50% Injectable 50 milliLiter(s) IV Push every 15 minutes  dextrose 50% Injectable 25 milliLiter(s) IV Push every 15 minutes  heparin   Injectable 7500 Unit(s) SubCutaneous every 8 hours  insulin glargine Injectable (LANTUS) 25 Unit(s) SubCutaneous every morning  insulin lispro (ADMELOG) corrective regimen sliding scale   SubCutaneous at bedtime  insulin lispro (ADMELOG) corrective regimen sliding scale   SubCutaneous three times a day before meals  levothyroxine 75 MICROGram(s) Oral daily  midodrine      midodrine 5 milliGRAM(s) Oral every 8 hours  nystatin Powder 1 Application(s) Topical two times a day  pantoprazole    Tablet 40 milliGRAM(s) Oral before breakfast    MEDICATIONS  (PRN):  sodium chloride 0.9% lock flush 10 milliLiter(s) IV Push every 1 hour PRN Pre/post blood products, medications, blood draw, and to maintain line patency      Allergies    Ativan (Rash; Urticaria)  penicillins (Hives)    Intolerances        REVIEW OF SYSTEMS:  CONSTITUTIONAL: No fever, weight loss, or fatigue  EYES: No eye pain, visual disturbances, or discharge  ENMT:  No difficulty hearing, tinnitus, vertigo; No sinus or throat pain  NECK: No pain or stiffness  BREASTS: No pain, masses, or nipple discharge  RESPIRATORY: No cough, wheezing, chills or hemoptysis; No shortness of breath  CARDIOVASCULAR: No chest pain, palpitations, dizziness, or leg swelling  GASTROINTESTINAL: No abdominal or epigastric pain. No nausea, vomiting, or hematemesis; No diarrhea or constipation. No melena or hematochezia.  GENITOURINARY: No dysuria, frequency, hematuria, or incontinence  NEUROLOGICAL: No headaches, memory loss, loss of strength, numbness, or tremors  SKIN: No itching, burning, rashes, or lesions   LYMPH NODES: No enlarged glands  ENDOCRINE: No heat or cold intolerance; No hair loss  MUSCULOSKELETAL: No joint pain or swelling; No muscle, back, or extremity pain  PSYCHIATRIC: No depression, anxiety, mood swings, or difficulty sleeping  HEME/LYMPH: No easy bruising, or bleeding gums  ALLERGY AND IMMUNOLOGIC: No hives or eczema    Vital Signs Last 24 Hrs  T(C): 36.7 (11 Jun 2023 05:21), Max: 36.7 (11 Jun 2023 05:21)  T(F): 98 (11 Jun 2023 05:21), Max: 98 (11 Jun 2023 05:21)  HR: 78 (11 Jun 2023 05:21) (78 - 85)  BP: 112/63 (11 Jun 2023 05:21) (90/55 - 112/63)  BP(mean): --  RR: 19 (11 Jun 2023 05:21) (19 - 19)  SpO2: 97% (11 Jun 2023 05:21) (92% - 97%)    Parameters below as of 11 Jun 2023 05:21  Patient On (Oxygen Delivery Method): room air        PHYSICAL EXAM:  GENERAL: NAD, well-groomed, well-developed  HEAD:  Atraumatic, Normocephalic  EYES: EOMI, PERRLA, conjunctiva and sclera clear  ENMT: No tonsillar erythema, exudates, or enlargement; Moist mucous membranes, Good dentition, No lesions  NECK: Supple, No JVD, Normal thyroid  NERVOUS SYSTEM:  Alert & Oriented X3, Motor Strength 5/5 B/L upper and lower extremities; DTRs 2+ intact and symmetric  CHEST/LUNG: Clear to percussion bilaterally; No rales, rhonchi, wheezing, or rubs  HEART: Regular rate and rhythm; No murmurs, rubs, or gallops  ABDOMEN: Soft, Nontender, Nondistended; Bowel sounds present  EXTREMITIES:  2+ Peripheral Pulses, No clubbing, cyanosis, or edema  LYMPH: No lymphadenopathy noted  SKIN: No rashes or lesions    LABS:                        10.4   20.32 )-----------( 165      ( 10 Frederic 2023 15:31 )             30.8     06-10    132<L>  |  98  |  85<H>  ----------------------------<  302<H>  4.0   |  25  |  4.20<H>    Ca    8.0<L>      10 Frederic 2023 08:10  Phos  3.1     06-10  Mg     2.3     06-10    TPro  5.6<L>  /  Alb  2.2<L>  /  TBili  0.5  /  DBili  x   /  AST  10<L>  /  ALT  9<L>  /  AlkPhos  76  06-10    PT/INR - ( 10 Frederic 2023 08:10 )   PT: 14.9 sec;   INR: 1.27 ratio         PTT - ( 10 Frederic 2023 08:10 )  PTT:29.2 sec    CAPILLARY BLOOD GLUCOSE      POCT Blood Glucose.: 315 mg/dL (11 Jun 2023 11:52)  POCT Blood Glucose.: 255 mg/dL (11 Jun 2023 07:37)  POCT Blood Glucose.: 269 mg/dL (10 Frederic 2023 21:33)  POCT Blood Glucose.: 287 mg/dL (10 Frederic 2023 17:31)      RADIOLOGY & ADDITIONAL TESTS:    Imaging Personally Reviewed:  [ ] YES  [ ] NO    Consultant(s) Notes Reviewed:  [ ] YES  [ ] NO    Care Discussed with Consultants/Other Providers [ ] YES  [ ] NO

## 2023-06-11 NOTE — PROGRESS NOTE ADULT - ASSESSMENT
72-year-old female with history of insulin-dependent diabetes, CHF, hypertension who is presenting with nausea and vomiting for the last few days states no significant abdominal pain and history of multiple dark emesis and 1 episode of diarrhea today.  Denies fever or chills.  Denies URI symptoms.

## 2023-06-12 DIAGNOSIS — D72.829 ELEVATED WHITE BLOOD CELL COUNT, UNSPECIFIED: ICD-10-CM

## 2023-06-12 LAB
ANION GAP SERPL CALC-SCNC: 8 MMOL/L — SIGNIFICANT CHANGE UP (ref 5–17)
BUN SERPL-MCNC: 84 MG/DL — HIGH (ref 7–23)
CALCIUM SERPL-MCNC: 7.7 MG/DL — LOW (ref 8.5–10.1)
CHLORIDE SERPL-SCNC: 100 MMOL/L — SIGNIFICANT CHANGE UP (ref 96–108)
CO2 SERPL-SCNC: 26 MMOL/L — SIGNIFICANT CHANGE UP (ref 22–31)
CREAT SERPL-MCNC: 4.1 MG/DL — HIGH (ref 0.5–1.3)
EGFR: 11 ML/MIN/1.73M2 — LOW
GLUCOSE SERPL-MCNC: 241 MG/DL — HIGH (ref 70–99)
HCT VFR BLD CALC: 29.7 % — LOW (ref 34.5–45)
HGB BLD-MCNC: 9.9 G/DL — LOW (ref 11.5–15.5)
INR BLD: 1.24 RATIO — HIGH (ref 0.88–1.16)
MAGNESIUM SERPL-MCNC: 2.4 MG/DL — SIGNIFICANT CHANGE UP (ref 1.6–2.6)
MCHC RBC-ENTMCNC: 29.6 PG — SIGNIFICANT CHANGE UP (ref 27–34)
MCHC RBC-ENTMCNC: 33.3 GM/DL — SIGNIFICANT CHANGE UP (ref 32–36)
MCV RBC AUTO: 88.7 FL — SIGNIFICANT CHANGE UP (ref 80–100)
NRBC # BLD: 0 /100 WBCS — SIGNIFICANT CHANGE UP (ref 0–0)
PHOSPHATE SERPL-MCNC: 3.4 MG/DL — SIGNIFICANT CHANGE UP (ref 2.5–4.5)
PLATELET # BLD AUTO: 207 K/UL — SIGNIFICANT CHANGE UP (ref 150–400)
POTASSIUM SERPL-MCNC: 3.8 MMOL/L — SIGNIFICANT CHANGE UP (ref 3.5–5.3)
POTASSIUM SERPL-SCNC: 3.8 MMOL/L — SIGNIFICANT CHANGE UP (ref 3.5–5.3)
PROTHROM AB SERPL-ACNC: 14.5 SEC — HIGH (ref 10.5–13.4)
RBC # BLD: 3.35 M/UL — LOW (ref 3.8–5.2)
RBC # FLD: 13.7 % — SIGNIFICANT CHANGE UP (ref 10.3–14.5)
SODIUM SERPL-SCNC: 134 MMOL/L — LOW (ref 135–145)
WBC # BLD: 19.69 K/UL — HIGH (ref 3.8–10.5)
WBC # FLD AUTO: 19.69 K/UL — HIGH (ref 3.8–10.5)

## 2023-06-12 PROCEDURE — 77001 FLUOROGUIDE FOR VEIN DEVICE: CPT | Mod: 26

## 2023-06-12 PROCEDURE — 76937 US GUIDE VASCULAR ACCESS: CPT | Mod: 26

## 2023-06-12 PROCEDURE — 36580 REPLACE CVAD CATH: CPT

## 2023-06-12 RX ORDER — METOPROLOL TARTRATE 50 MG
100 TABLET ORAL DAILY
Refills: 0 | Status: DISCONTINUED | OUTPATIENT
Start: 2023-06-12 | End: 2023-06-13

## 2023-06-12 RX ORDER — CHLORHEXIDINE GLUCONATE 213 G/1000ML
1 SOLUTION TOPICAL
Refills: 0 | Status: DISCONTINUED | OUTPATIENT
Start: 2023-06-12 | End: 2023-06-13

## 2023-06-12 RX ADMIN — Medication 75 MICROGRAM(S): at 05:26

## 2023-06-12 RX ADMIN — MIDODRINE HYDROCHLORIDE 5 MILLIGRAM(S): 2.5 TABLET ORAL at 05:26

## 2023-06-12 RX ADMIN — PANTOPRAZOLE SODIUM 40 MILLIGRAM(S): 20 TABLET, DELAYED RELEASE ORAL at 05:27

## 2023-06-12 RX ADMIN — NYSTATIN CREAM 1 APPLICATION(S): 100000 CREAM TOPICAL at 05:27

## 2023-06-12 RX ADMIN — NYSTATIN CREAM 1 APPLICATION(S): 100000 CREAM TOPICAL at 21:33

## 2023-06-12 RX ADMIN — Medication 3: at 08:39

## 2023-06-12 RX ADMIN — Medication 4: at 11:47

## 2023-06-12 RX ADMIN — HEPARIN SODIUM 7500 UNIT(S): 5000 INJECTION INTRAVENOUS; SUBCUTANEOUS at 21:29

## 2023-06-12 RX ADMIN — ATORVASTATIN CALCIUM 80 MILLIGRAM(S): 80 TABLET, FILM COATED ORAL at 21:29

## 2023-06-12 RX ADMIN — CEFTRIAXONE 100 MILLIGRAM(S): 500 INJECTION, POWDER, FOR SOLUTION INTRAMUSCULAR; INTRAVENOUS at 11:54

## 2023-06-12 RX ADMIN — INSULIN GLARGINE 25 UNIT(S): 100 INJECTION, SOLUTION SUBCUTANEOUS at 08:40

## 2023-06-12 RX ADMIN — MIDODRINE HYDROCHLORIDE 5 MILLIGRAM(S): 2.5 TABLET ORAL at 21:32

## 2023-06-12 NOTE — PROGRESS NOTE ADULT - SUBJECTIVE AND OBJECTIVE BOX
Date of Service 06-12-23 @ 16:54    Patient is a 72y old  Female who presents with a chief complaint of sepsis (12 Jun 2023 12:14)      INTERVAL /OVERNIGHT EVENTS: persistant wbc    MEDICATIONS  (STANDING):  aspirin  chewable 81 milliGRAM(s) Oral daily  atorvastatin 80 milliGRAM(s) Oral at bedtime  cefTRIAXone   IVPB 2000 milliGRAM(s) IV Intermittent every 24 hours  chlorhexidine 4% Liquid 1 Application(s) Topical <User Schedule>  dextrose 5%. 1000 milliLiter(s) (50 mL/Hr) IV Continuous <Continuous>  dextrose 50% Injectable 50 milliLiter(s) IV Push every 15 minutes  dextrose 50% Injectable 25 milliLiter(s) IV Push every 15 minutes  heparin   Injectable 7500 Unit(s) SubCutaneous every 8 hours  insulin glargine Injectable (LANTUS) 25 Unit(s) SubCutaneous every morning  insulin lispro (ADMELOG) corrective regimen sliding scale   SubCutaneous three times a day before meals  insulin lispro (ADMELOG) corrective regimen sliding scale   SubCutaneous at bedtime  levothyroxine 75 MICROGram(s) Oral daily  metoprolol succinate  milliGRAM(s) Oral daily  midodrine      midodrine 5 milliGRAM(s) Oral every 8 hours  nystatin Powder 1 Application(s) Topical two times a day  pantoprazole    Tablet 40 milliGRAM(s) Oral before breakfast    MEDICATIONS  (PRN):  sodium chloride 0.9% lock flush 10 milliLiter(s) IV Push every 1 hour PRN Pre/post blood products, medications, blood draw, and to maintain line patency      Allergies    Ativan (Rash; Urticaria)  penicillins (Hives)    Intolerances        REVIEW OF SYSTEMS:  CONSTITUTIONAL: No fever, weight loss, or fatigue  EYES: No eye pain, visual disturbances, or discharge  ENMT:  No difficulty hearing, tinnitus, vertigo; No sinus or throat pain  NECK: No pain or stiffness  RESPIRATORY: No cough, wheezing, chills or hemoptysis; No shortness of breath  CARDIOVASCULAR: No chest pain, palpitations, dizziness, or leg swelling  GASTROINTESTINAL: No abdominal or epigastric pain. No nausea, vomiting, or hematemesis; No diarrhea or constipation. No melena or hematochezia.  GENITOURINARY: No dysuria, frequency, hematuria, or incontinence  NEUROLOGICAL: No headaches, memory loss, loss of strength, numbness, or tremors  SKIN: No itching, burning, rashes, or lesions   LYMPH NODES: No enlarged glands  ENDOCRINE: No heat or cold intolerance; No hair loss; No polydipsia or polyuria  MUSCULOSKELETAL: No joint pain or swelling; No muscle, back, or extremity pain  PSYCHIATRIC: No depression, anxiety, mood swings, or difficulty sleeping  HEME/LYMPH: No easy bruising, or bleeding gums  ALLERGY AND IMMUNOLOGIC: No hives or eczema    Vital Signs Last 24 Hrs  T(C): 36.6 (12 Jun 2023 15:05), Max: 36.8 (12 Jun 2023 05:03)  T(F): 97.9 (12 Jun 2023 15:05), Max: 98.2 (12 Jun 2023 05:03)  HR: 75 (12 Jun 2023 15:05) (69 - 82)  BP: 122/53 (12 Jun 2023 15:05) (105/47 - 122/63)  BP(mean): --  RR: 16 (12 Jun 2023 15:05) (13 - 29)  SpO2: 99% (12 Jun 2023 15:05) (97% - 100%)    Parameters below as of 12 Jun 2023 15:05  Patient On (Oxygen Delivery Method): room air        PHYSICAL EXAM:  GENERAL: NAD, well-groomed, well-developed  HEAD:  Atraumatic, Normocephalic  EYES: EOMI, PERRLA, conjunctiva and sclera clear  ENMT: No tonsillar erythema, exudates, or enlargement; Moist mucous membranes, Good dentition, No lesions  NECK: Supple, No JVD, Normal thyroid  NERVOUS SYSTEM:  Alert & Oriented X3, Good concentration; Motor Strength 5/5 B/L upper and lower extremities; DTRs 2+ intact and symmetric  CHEST/LUNG: Clear to auscultation bilaterally; No rales, rhonchi, wheezing, or rubs  HEART: Regular rate and rhythm; No murmurs, rubs, or gallops  ABDOMEN: Soft, Nontender, Nondistended; Bowel sounds present  EXTREMITIES:  2+ Peripheral Pulses, No clubbing, cyanosis, or edema  LYMPH: No lymphadenopathy noted  SKIN: No rashes or lesions    LABS:                        9.9    19.69 )-----------( 207      ( 12 Jun 2023 06:25 )             29.7     12 Jun 2023 06:25    134    |  100    |  84     ----------------------------<  241    3.8     |  26     |  4.10     Ca    7.7        12 Jun 2023 06:25  Phos  3.4       12 Jun 2023 06:25  Mg     2.4       12 Jun 2023 06:25      PT/INR - ( 12 Jun 2023 06:25 )   PT: 14.5 sec;   INR: 1.24 ratio             CAPILLARY BLOOD GLUCOSE      POCT Blood Glucose.: 303 mg/dL (12 Jun 2023 11:34)  POCT Blood Glucose.: 270 mg/dL (12 Jun 2023 07:43)  POCT Blood Glucose.: 264 mg/dL (11 Jun 2023 22:06)  POCT Blood Glucose.: 257 mg/dL (11 Jun 2023 17:07)      RADIOLOGY & ADDITIONAL TESTS:    Notes Reviewed:  [x ] YES  [ ] NO    Care Discussed with Consultants/Other Providers [x ] YES  [ ] NO

## 2023-06-12 NOTE — PRE PROCEDURE NOTE - PRE PROCEDURE EVALUATION
Interventional Radiology Pre-Procedure Note    72 year old female with requirement for hemodialysis and leukocytosis, referred for non-tunneled dialysis catheter placement.    Indwelling catheter placed by ICU reportedly with suboptimal function.    Plan for removal of indwelling catheter and placement of new RIJ nontunneled catheter below prior access site.    Patient agreeable for procedure, discussed risks, benefits, and alternatives.    PAST MEDICAL & SURGICAL HISTORY:  Hypertension      Diabetes      Lymphedema      H/O left bundle branch block      History of left bundle branch block (LBBB)      Systolic heart failure, chronic      H/O cataract  2020      History of surgical removal of meniscus of knee  left in 1971      Frozen shoulder  2000      H/O Achilles tendon repair  lengthened bilaterally, 2000      Fractured skull  1968           CBC Full  -  ( 12 Jun 2023 06:25 )  WBC Count : 19.69 K/uL  RBC Count : 3.35 M/uL  Hemoglobin : 9.9 g/dL  Hematocrit : 29.7 %  Platelet Count - Automated : 207 K/uL  Mean Cell Volume : 88.7 fl  Mean Cell Hemoglobin : 29.6 pg  Mean Cell Hemoglobin Concentration : 33.3 gm/dL  Auto Neutrophil # : x  Auto Lymphocyte # : x  Auto Monocyte # : x  Auto Eosinophil # : x  Auto Basophil # : x  Auto Neutrophil % : x  Auto Lymphocyte % : x  Auto Monocyte % : x  Auto Eosinophil % : x  Auto Basophil % : x    06-12    134<L>  |  100  |  84<H>  ----------------------------<  241<H>  3.8   |  26  |  4.10<H>    Ca    7.7<L>      12 Jun 2023 06:25  Phos  3.4     06-12  Mg     2.4     06-12      PT/INR - ( 12 Jun 2023 06:25 )   PT: 14.5 sec;   INR: 1.24 ratio             Interventional Radiology Attending Physician:    Ordering Attending Physician: Interventional Radiology Pre-Procedure Note    72 year old female with requirement for hemodialysis and leukocytosis, referred for non-tunneled dialysis catheter placement.    Indwelling catheter placed by ICU reportedly with suboptimal function.    Plan for removal of indwelling catheter and placement of new RIJ nontunneled catheter below prior access site.    Patient agreeable for procedure, discussed risks, benefits, and alternatives.    PAST MEDICAL & SURGICAL HISTORY:  Hypertension      Diabetes      Lymphedema      H/O left bundle branch block      History of left bundle branch block (LBBB)      Systolic heart failure, chronic      H/O cataract  2020      History of surgical removal of meniscus of knee  left in 1971      Frozen shoulder  2000      H/O Achilles tendon repair  lengthened bilaterally, 2000      Fractured skull  1968           CBC Full  -  ( 12 Jun 2023 06:25 )  WBC Count : 19.69 K/uL  RBC Count : 3.35 M/uL  Hemoglobin : 9.9 g/dL  Hematocrit : 29.7 %  Platelet Count - Automated : 207 K/uL  Mean Cell Volume : 88.7 fl  Mean Cell Hemoglobin : 29.6 pg  Mean Cell Hemoglobin Concentration : 33.3 gm/dL  Auto Neutrophil # : x  Auto Lymphocyte # : x  Auto Monocyte # : x  Auto Eosinophil # : x  Auto Basophil # : x  Auto Neutrophil % : x  Auto Lymphocyte % : x  Auto Monocyte % : x  Auto Eosinophil % : x  Auto Basophil % : x    06-12    134<L>  |  100  |  84<H>  ----------------------------<  241<H>  3.8   |  26  |  4.10<H>    Ca    7.7<L>      12 Jun 2023 06:25  Phos  3.4     06-12  Mg     2.4     06-12      PT/INR - ( 12 Jun 2023 06:25 )   PT: 14.5 sec;   INR: 1.24 ratio           Assessment / Plan:    72 year old female with requirement for hemodialysis and leukocytosis, referred for non-tunneled dialysis catheter placement.    Indwelling catheter placed by ICU reportedly with suboptimal function.    Plan for removal of indwelling catheter and placement of new RIJ nontunneled catheter below prior access site.    Patient agreeable for procedure, discussed risks, benefits, and alternatives.

## 2023-06-12 NOTE — PROGRESS NOTE ADULT - ASSESSMENT
Acute on CKD Stage 4  Sepsis/UTI  L Hydronephrosis  Uremia      -DUNCAN from sepsis/hypotension and renal hypoperfusion; unilateral mild hydro should not cause this degree of DUNCAN  -Abx, renal dosing  -Urology eval noted  -Monitor chemistries and urine output  -Renal indices worsened with uremic symptoms leading to dialysis; still urinating, but still documented < 1L  -Consented for dialysis  -Discussed with patient risks and benefits of dialysis in depth up to and including death, she agrees to dialysis if necessary  -Initiated dialysis 6/6/23; ICU placed access; However, due to collapsing vasculature, dialysis was not able to be done effectively and had about 20 mins on the machine. Now s/p IV albumin and IVF boluses. Last HD 6/10/23 not successful   -Catheter failed despite cath flow, only 30 minutes dialysis, but will ultimately need permacath, however with WBC increasing, will hold off on permacath, but will need new temp cath, can be done with IR.    -Place [facility] today on 6/12/23. HD today 6/12/23    D/w HD RN  D/w Dr. Mcdermott     Thank you

## 2023-06-12 NOTE — PROGRESS NOTE ADULT - SUBJECTIVE AND OBJECTIVE BOX
Patient is a 72y old  Female who presents with a chief complaint of sepsis (04 Jun 2023 10:59)       HPI: female with history of insulin-dependent diabetes, CHF, hypertension who presented to the ED with nausea and vomiting for several days.  She has experienced no significant abdominal pain and history of multiple dark emesis and 1 episode of diarrhea today.  She denies fever or chills.  Denies URI symptoms.  She denies significant change in her urinary patters. CT performed on admission demonstrated mild left hydronephrosis without ureteral dilatation or obstructing calculus. Found to have DUNCAN and hypotension. Renal consulted for further eval. Has not seen a Nephrologist outpatient. Currently asymptomatic.      No acute events noted overnight, N/V resolved. Having some itching    PAST MEDICAL & SURGICAL HISTORY:  Hypertension      Diabetes      Lymphedema      H/O left bundle branch block      History of left bundle branch block (LBBB)      Systolic heart failure, chronic      H/O cataract  2020      History of surgical removal of meniscus of knee  left in 1971      Frozen shoulder  2000      H/O Achilles tendon repair  lengthened bilaterally, 2000      Fractured skull  1968         MEDICATIONS  (STANDING):  aspirin  chewable 81 milliGRAM(s) Oral daily  atorvastatin 80 milliGRAM(s) Oral at bedtime  cefTRIAXone   IVPB 2000 milliGRAM(s) IV Intermittent every 24 hours  chlorhexidine 2% Cloths 1 Application(s) Topical <User Schedule>  dextrose 50% Injectable 50 milliLiter(s) IV Push every 15 minutes  dextrose 50% Injectable 25 milliLiter(s) IV Push every 15 minutes  heparin   Injectable 7500 Unit(s) SubCutaneous every 8 hours  insulin glargine Injectable (LANTUS) 25 Unit(s) SubCutaneous every morning  insulin lispro (ADMELOG) corrective regimen sliding scale   SubCutaneous every 6 hours  levothyroxine 75 MICROGram(s) Oral daily  midodrine      midodrine 5 milliGRAM(s) Oral every 8 hours  mupirocin 2% Nasal 1 Application(s) Both Nostrils two times a day  nystatin Powder 1 Application(s) Topical two times a day  pantoprazole  Injectable 40 milliGRAM(s) IV Push daily    MEDICATIONS  (PRN):  sodium chloride 0.9% lock flush 10 milliLiter(s) IV Push every 1 hour PRN Pre/post blood products, medications, blood draw, and to maintain line patency  trimethobenzamide Injectable 200 milliGRAM(s) IntraMuscular every 8 hours PRN Nausea and/or Vomiting    FAMILY HISTORY:  Family history of CVA  mom, age 57    [place]    Social History:Non smoker        Allergies    penicillins (Hives)    Intolerances         REVIEW OF SYSTEMS:    as above    Vital Signs Last 24 Hrs  T(C): 36.6 (12 Jun 2023 11:42), Max: 36.8 (12 Jun 2023 05:03)  T(F): 97.8 (12 Jun 2023 11:42), Max: 98.2 (12 Jun 2023 05:03)  HR: 74 (12 Jun 2023 11:42) (69 - 105)  BP: 105/66 (12 Jun 2023 11:42) (105/66 - 132/68)  BP(mean): --  RR: 17 (12 Jun 2023 11:42) (17 - 18)  SpO2: 97% (12 Jun 2023 11:42) (96% - 98%)    Parameters below as of 12 Jun 2023 11:42  Patient On (Oxygen Delivery Method): room air            PHYSICAL EXAM:    GENERAL: NAD  HEAD:  Atraumatic, Normocephalic  EYES: EOMI, conjunctiva and sclera clear  ENMT: No Drainage from nares, No drainage from ears  NECK: Supple, +dialysis catheter intact  NERVOUS SYSTEM:  Awake and Alert  CHEST/LUNG: Clear to percussion bilaterally; No rales, rhonchi, wheezing, or rubs  HEART: Regular rate and rhythm; No murmurs, rubs, or gallops  ABDOMEN: Soft, Nontender, Nondistended; Bowel sounds present, obese  EXTREMITIES:  + Edema  SKIN: No rashes No obvious ecchymosis      LABS:                        9.9    19.69 )-----------( 207      ( 12 Jun 2023 06:25 )             29.7     06-12    134<L>  |  100  |  84<H>  ----------------------------<  241<H>  3.8   |  26  |  4.10<H>    Ca    7.7<L>      12 Jun 2023 06:25  Phos  3.4     06-12  Mg     2.4     06-12      PT/INR - ( 12 Jun 2023 06:25 )   PT: 14.5 sec;   INR: 1.24 ratio

## 2023-06-12 NOTE — PROGRESS NOTE ADULT - SUBJECTIVE AND OBJECTIVE BOX
EVELYN LOPEZ is a 72yFemale , patient examined and chart reviewed.    INTERVAL HPI/ OVERNIGHT EVENTS:   Getting HD. Had temporary HD cath replaced.  NAD. Afebrile    PAST MEDICAL & SURGICAL HISTORY:  Hypertension  Diabetes  Lymphedema  H/O left bundle branch block  History of left bundle branch block (LBBB)  Systolic heart failure, chronic  H/O cataract  2020  History of surgical removal of meniscus of knee  left in 1971  Frozen shoulder  2000  H/O Achilles tendon repair  lengthened bilaterally, 2000  Fractured skull  1968      For details regarding the patient's social history, family history, and other miscellaneous elements, please refer the initial infectious diseases consultation and/or the admitting history and physical examination for this admission.     ROS:  CONSTITUTIONAL:  Negative fever or chills  EYES:  Negative  blurry vision or double vision  CARDIOVASCULAR:  Negative for chest pain or palpitations  RESPIRATORY:  Negative for cough, wheezing, or SOB   GASTROINTESTINAL:  Negative for nausea vomiting, diarrhea, constipation, or abdominal pain   GENITOURINARY:  Negative frequency, urgency or dysuria  NEUROLOGIC:  No headache, confusion, dizziness, lightheadedness  All other systems were reviewed and are negative     ALLERGIES  penicillins (Hives)      Current inpatient medications :    ANTIBIOTICS/RELEVANT:  cefTRIAXone   IVPB 2000 milliGRAM(s) IV Intermittent every 24 hours    MEDICATIONS  (STANDING):  aspirin  chewable 81 milliGRAM(s) Oral daily  atorvastatin 80 milliGRAM(s) Oral at bedtime  chlorhexidine 4% Liquid 1 Application(s) Topical <User Schedule>  dextrose 5%. 1000 milliLiter(s) (50 mL/Hr) IV Continuous <Continuous>  dextrose 50% Injectable 50 milliLiter(s) IV Push every 15 minutes  dextrose 50% Injectable 25 milliLiter(s) IV Push every 15 minutes  heparin   Injectable 7500 Unit(s) SubCutaneous every 8 hours  insulin glargine Injectable (LANTUS) 25 Unit(s) SubCutaneous every morning  insulin lispro (ADMELOG) corrective regimen sliding scale   SubCutaneous at bedtime  insulin lispro (ADMELOG) corrective regimen sliding scale   SubCutaneous three times a day before meals  levothyroxine 75 MICROGram(s) Oral daily  metoprolol succinate  milliGRAM(s) Oral daily  midodrine      midodrine 5 milliGRAM(s) Oral every 8 hours  nystatin Powder 1 Application(s) Topical two times a day  pantoprazole    Tablet 40 milliGRAM(s) Oral before breakfast    MEDICATIONS  (PRN):  sodium chloride 0.9% lock flush 10 milliLiter(s) IV Push every 1 hour PRN Pre/post blood products, medications, blood draw, and to maintain line patency      Objective:  Vital Signs Last 24 Hrs  T(C): 36.6 (12 Jun 2023 20:46), Max: 36.8 (12 Jun 2023 05:03)  T(F): 97.8 (12 Jun 2023 20:46), Max: 98.2 (12 Jun 2023 05:03)  HR: 88 (12 Jun 2023 20:46) (73 - 88)  BP: 102/65 (12 Jun 2023 20:46) (102/65 - 122/53)  RR: 18 (12 Jun 2023 20:46) (13 - 29)  SpO2: 98% (12 Jun 2023 20:46) (96% - 100%)    Parameters below as of 12 Jun 2023 20:46  Patient On (Oxygen Delivery Method): room air      Physical Exam:  General: no acute distress  Neck: supple, trachea midline right IJ HD cath c/d/i  Lungs: clear, no wheeze/rhonchi  Cardiovascular: regular rate and rhythm, S1 S2  Abdomen: soft, nontender,  bowel sounds normal  Neurological: alert and oriented x3  Skin: no rash  Extremities: + edema      LABS:                        9.9    19.69 )-----------( 207      ( 12 Jun 2023 06:25 )             29.7   06-12    134<L>  |  100  |  84<H>  ----------------------------<  241<H>  3.8   |  26  |  4.10<H>    Ca    7.7<L>      12 Jun 2023 06:25  Phos  3.4     06-12  Mg     2.4     06-12    MICROBIOLOGY:    Culture - Blood (collected 11 Jun 2023 14:25)  Source: .Blood Blood  Preliminary Report (12 Jun 2023 20:02):    No growth to date.    Culture - Blood (collected 11 Jun 2023 12:40)  Source: .Blood Blood  Preliminary Report (12 Jun 2023 20:02):    No growth to date.      RADIOLOGY & ADDITIONAL STUDIES:    ACC: [id number] EXAM:  CT ABDOMEN AND PELVIS OC   ORDERED BY:  PEDRO LUIS MARTIN     PROCEDURE DATE:  06/04/2023          INTERPRETATION:  CLINICAL INFORMATION: Vomiting and abdominal pain.    COMPARISON: 4/20/2023 CT abdomen pelvis    CONTRAST/COMPLICATIONS:  IV Contrast: NONE  Oral Contrast: Omnipaque 300  Complications: None reported at time of study completion    PROCEDURE:  CT of the Abdomen and Pelvis was performed.  Sagittal and coronal reformats were performed.    FINDINGS:  LOWER CHEST: Within normal limits.    LIVER: Within normal limits.  BILE DUCTS: Normal caliber.  GALLBLADDER: Within normal limits.  SPLEEN: Within normal limits.  PANCREAS: Within normal limits.  ADRENALS: Within normal limits.  KIDNEYS/URETERS: Mild left hydronephrosis but with no stone or other   obstructing lesion identified. Suggestion of left renal pelvis and   ureteral wall thickening not well evaluated without IV contrast. Kidneys   otherwise similar to prior with mild bilateral perinephric stranding. No   concerning renal mass.    BLADDER: Nondilated. No stones or filling defects.  REPRODUCTIVE ORGANS: Small calcification in the uterus. No concerning   findings.    BOWEL: No bowel obstruction. Appendix is normal.  PERITONEUM: No ascites.  VESSELS:No abdominal aortic aneurysm. Atherosclerosis similar to prior.  RETROPERITONEUM/LYMPH NODES: No lymphadenopathy.  ABDOMINAL WALL: Within normal limits.  BONES: No acute findings. Bilateral L5 spondylolysis with mild   degenerative anterolisthesis ofL5 on S1. Prominent degenerative changes   lower lumbar spine.    IMPRESSION:  Mild left hydronephrosis is new. Left ureter not dilated. Suggestion of   left renal pelvis wall thickening not well evaluated without IV contrast.   Urinary tract infection could appear this way though not definitive.   Early or mild proximal left ureteral obstruction also possible though no   stone or other obstructing lesion is evident.    No other significant change. No bowel obstruction.    Assessment :   71yo F with PMHx IDDM2, HFrEF, HTN, hypothyroidism, LBBB, chronic lymphedema admitted with septic shock and Ecoli bacteremia sec Left pyelo with hydro- no obtructive uropathy on CT    Off pressors  Worsening DUNCAN started on HD 6/6  Rising WBC Afebrile Repeat blood cultures 6/11 ngtd    Plan :   Cont Rocephin till 6/14  If persistent leukocytosis will repeat CT CAP  If repeat blood cultures NGTD 48 hours can proceed with permanent HD cath  Fu repeat blood cultures- NGTD  HD per renal  Trend temps and cbc  Pulm toileting  Increase activity/OOB to chair  Stable from ID standpoint      Continue with present regiment.  Appropriate use of antibiotics and adverse effects reviewed.      > 35 minutes were spent in direct patient care reviewing notes, medications ,labs data/ imaging , discussion with multidisciplinary team.    Thank you for allowing me to participate in care of your patient .    Robby Clark MD  Infectious Disease  [phone]

## 2023-06-12 NOTE — CASE MANAGEMENT PROGRESS NOTE - NSCMPROGRESSNOTE_GEN_ALL_CORE
Discussed patient in interdisciplinary rounds.  Patient is anticipated IR for temporary dialysis catheter today.  Patient remains medically acute and currently anticipate D/C home when discharge ready.  Will remain available from a case management perspective.

## 2023-06-12 NOTE — CHART NOTE - NSCHARTNOTEFT_GEN_A_CORE
Assessment/Follow up: Pt A+Ox4 at visit. Reports that she is very tired and "had a rough night." Nutrition interview kept brief at pt request. Consistent Carbohydrate, Renal diet rx. Small appetite po intake for breakfast 6/12 however pt reports usually good appetite/po intake; 51-10% po intake per pt/EMR. No report N/V. +BM 6/11 per pt. Poorly controlled BS past 72hrs (ranging from 241-346). Hgba1c 8.2%(6/2). Currently on 25units lantus qam plus sliding scale covg. Pta on 37units Lantus q hs plus ss novolog covg. Recommend insulin adjustments per MD order for improved glycemic control. Recommend Endo/CDE consultation. Per labs elevated BUN/Creat c/w DUNCAN on CKD. S/p unsuccessful HD treatment 6/10 secondary to non-functioning IJ. Plan for replacement in IR today with HD treatment after.  Encourage continued compliance to Consistent carbohydrate, Renal diet. Recommend 1.5L po FR. Will follow for diet education when pt ready/prior to discharge.       Factors impacting intake: [x ] none [ ] nausea  [ ] vomiting [ ] diarrhea [ ] constipation  [ ]chewing problems [ ] swallowing issues  [ ] other:     Diet Presciption: Diet, Consistent Carbohydrate/No Snacks:   For patients receiving Renal Replacement - No Protein Restr, No Conc K, No Conc Phos, Low Sodium (RENAL) (06-08-23 @ 12:34)    Intake: % past 5 days    Current Weight: 343lbs(6/11), increase in weight likely secondary fluid gains, rome legs 3+edema; plan for hemodialysis treatment today 6/12.       Pertinent Medications: MEDICATIONS  (STANDING):  aspirin  chewable 81 milliGRAM(s) Oral daily  atorvastatin 80 milliGRAM(s) Oral at bedtime  cefTRIAXone   IVPB 2000 milliGRAM(s) IV Intermittent every 24 hours  dextrose 5%. 1000 milliLiter(s) (50 mL/Hr) IV Continuous <Continuous>  dextrose 50% Injectable 50 milliLiter(s) IV Push every 15 minutes  dextrose 50% Injectable 25 milliLiter(s) IV Push every 15 minutes  heparin   Injectable 7500 Unit(s) SubCutaneous every 8 hours  insulin glargine Injectable (LANTUS) 25 Unit(s) SubCutaneous every morning  insulin lispro (ADMELOG) corrective regimen sliding scale   SubCutaneous three times a day before meals  insulin lispro (ADMELOG) corrective regimen sliding scale   SubCutaneous at bedtime  levothyroxine 75 MICROGram(s) Oral daily  midodrine      midodrine 5 milliGRAM(s) Oral every 8 hours  nystatin Powder 1 Application(s) Topical two times a day  pantoprazole    Tablet 40 milliGRAM(s) Oral before breakfast    MEDICATIONS  (PRN):  sodium chloride 0.9% lock flush 10 milliLiter(s) IV Push every 1 hour PRN Pre/post blood products, medications, blood draw, and to maintain line patency    Pertinent Labs: 06-12 Na134 mmol/L<L> Glu 241 mg/dL<H> K+ 3.8 mmol/L Cr  4.10 mg/dL<H> BUN 84 mg/dL<H> 06-12 Phos 3.4 mg/dL 06-10 Alb 2.2 g/dL<L>     CAPILLARY BLOOD GLUCOSE      POCT Blood Glucose.: 270 mg/dL (12 Jun 2023 07:43)  POCT Blood Glucose.: 264 mg/dL (11 Jun 2023 22:06)  POCT Blood Glucose.: 257 mg/dL (11 Jun 2023 17:07)  POCT Blood Glucose.: 315 mg/dL (11 Jun 2023 11:52)    Skin: ecchymotic, psoriasis. Wei 19.    Estimated Needs:   [ x] no change since previous assessment  [ ] recalculated:     Previous Nutrition Diagnosis:   [ ] Inadequate Energy Intake [ ]Inadequate Oral Intake [ ] Excessive Energy Intake   [ ] Underweight [ ] Increased Nutrient Needs [x ] Overweight/Obesity   [ ] Altered GI Function [ ] Unintended Weight Loss [ ] Food & Nutrition Related Knowledge Deficit [ ] Malnutrition   [ x] Altered labs (BUN/Creat/Hgba1c)    Nutrition Diagnosis is [x] ongoing  [ ] resolved [ ] not applicable     New Nutrition Diagnosis: [x ] not applicable       Interventions:   Recommend  [x ] Change Diet To: Consistent Carbohydrate, Dash/TLC, 1.5L po FR  [ ] Nutrition Supplement  [ ] Nutrition Support  [x ] Other: Nephrovite daily. Insulin adjustments for improved glycemic control. Endo/CDE consultation.     Monitoring and Evaluation:   [x ] PO intake [ x ] Tolerance to diet prescription [ x ] weights [ x ] labs[ x ] follow up per protocol  [ ] other:

## 2023-06-12 NOTE — PROCEDURE NOTE - PROCEDURE FINDINGS AND DETAILS
Indwelling RIJ dialysis catheter was removed under sterile conditions. Small amount of nonocclusive clot noted in RIJ vein where prior catheter was placed.    Lower portion of right IJ vein above clavicle was patent widely. Subsequently, new RIJ 14Fr x 15cm catheter placed under sterile conditions with ultrasound and fluoroscopic guidance with good blood return from both lumens, okay to use for hemodialysis.  Follow up ID evaluation.

## 2023-06-13 DIAGNOSIS — M79.89 OTHER SPECIFIED SOFT TISSUE DISORDERS: ICD-10-CM

## 2023-06-13 LAB
ANION GAP SERPL CALC-SCNC: 5 MMOL/L — SIGNIFICANT CHANGE UP (ref 5–17)
BASOPHILS # BLD AUTO: 0.05 K/UL — SIGNIFICANT CHANGE UP (ref 0–0.2)
BASOPHILS NFR BLD AUTO: 0.2 % — SIGNIFICANT CHANGE UP (ref 0–2)
BUN SERPL-MCNC: 57 MG/DL — HIGH (ref 7–23)
CALCIUM SERPL-MCNC: 7.8 MG/DL — LOW (ref 8.5–10.1)
CHLORIDE SERPL-SCNC: 102 MMOL/L — SIGNIFICANT CHANGE UP (ref 96–108)
CO2 SERPL-SCNC: 27 MMOL/L — SIGNIFICANT CHANGE UP (ref 22–31)
CREAT SERPL-MCNC: 3.2 MG/DL — HIGH (ref 0.5–1.3)
EGFR: 15 ML/MIN/1.73M2 — LOW
EOSINOPHIL # BLD AUTO: 0.83 K/UL — HIGH (ref 0–0.5)
EOSINOPHIL NFR BLD AUTO: 4 % — SIGNIFICANT CHANGE UP (ref 0–6)
GLUCOSE SERPL-MCNC: 221 MG/DL — HIGH (ref 70–99)
HCT VFR BLD CALC: 28.7 % — LOW (ref 34.5–45)
HGB BLD-MCNC: 9.6 G/DL — LOW (ref 11.5–15.5)
IMM GRANULOCYTES NFR BLD AUTO: 1.3 % — HIGH (ref 0–0.9)
LYMPHOCYTES # BLD AUTO: 1.68 K/UL — SIGNIFICANT CHANGE UP (ref 1–3.3)
LYMPHOCYTES # BLD AUTO: 8.1 % — LOW (ref 13–44)
MCHC RBC-ENTMCNC: 29.9 PG — SIGNIFICANT CHANGE UP (ref 27–34)
MCHC RBC-ENTMCNC: 33.4 GM/DL — SIGNIFICANT CHANGE UP (ref 32–36)
MCV RBC AUTO: 89.4 FL — SIGNIFICANT CHANGE UP (ref 80–100)
MONOCYTES # BLD AUTO: 1.34 K/UL — HIGH (ref 0–0.9)
MONOCYTES NFR BLD AUTO: 6.5 % — SIGNIFICANT CHANGE UP (ref 2–14)
NEUTROPHILS # BLD AUTO: 16.57 K/UL — HIGH (ref 1.8–7.4)
NEUTROPHILS NFR BLD AUTO: 79.9 % — HIGH (ref 43–77)
NRBC # BLD: 0 /100 WBCS — SIGNIFICANT CHANGE UP (ref 0–0)
PHOSPHATE SERPL-MCNC: 3.2 MG/DL — SIGNIFICANT CHANGE UP (ref 2.5–4.5)
PLATELET # BLD AUTO: 156 K/UL — SIGNIFICANT CHANGE UP (ref 150–400)
POTASSIUM SERPL-MCNC: 4.8 MMOL/L — SIGNIFICANT CHANGE UP (ref 3.5–5.3)
POTASSIUM SERPL-SCNC: 4.8 MMOL/L — SIGNIFICANT CHANGE UP (ref 3.5–5.3)
RBC # BLD: 3.21 M/UL — LOW (ref 3.8–5.2)
RBC # FLD: 13.7 % — SIGNIFICANT CHANGE UP (ref 10.3–14.5)
SODIUM SERPL-SCNC: 134 MMOL/L — LOW (ref 135–145)
WBC # BLD: 20.74 K/UL — HIGH (ref 3.8–10.5)
WBC # FLD AUTO: 20.74 K/UL — HIGH (ref 3.8–10.5)

## 2023-06-13 PROCEDURE — 99223 1ST HOSP IP/OBS HIGH 75: CPT

## 2023-06-13 PROCEDURE — 93970 EXTREMITY STUDY: CPT | Mod: 26

## 2023-06-13 PROCEDURE — 93010 ELECTROCARDIOGRAM REPORT: CPT

## 2023-06-13 RX ORDER — METOPROLOL TARTRATE 50 MG
25 TABLET ORAL DAILY
Refills: 0 | Status: DISCONTINUED | OUTPATIENT
Start: 2023-06-13 | End: 2023-06-19

## 2023-06-13 RX ORDER — CEFTRIAXONE 500 MG/1
2000 INJECTION, POWDER, FOR SOLUTION INTRAMUSCULAR; INTRAVENOUS ONCE
Refills: 0 | Status: COMPLETED | OUTPATIENT
Start: 2023-06-14 | End: 2023-06-14

## 2023-06-13 RX ADMIN — Medication 75 MICROGRAM(S): at 05:47

## 2023-06-13 RX ADMIN — HEPARIN SODIUM 7500 UNIT(S): 5000 INJECTION INTRAVENOUS; SUBCUTANEOUS at 22:06

## 2023-06-13 RX ADMIN — Medication 81 MILLIGRAM(S): at 12:46

## 2023-06-13 RX ADMIN — Medication 5: at 11:50

## 2023-06-13 RX ADMIN — Medication 5: at 17:18

## 2023-06-13 RX ADMIN — ATORVASTATIN CALCIUM 80 MILLIGRAM(S): 80 TABLET, FILM COATED ORAL at 22:06

## 2023-06-13 RX ADMIN — MIDODRINE HYDROCHLORIDE 5 MILLIGRAM(S): 2.5 TABLET ORAL at 22:06

## 2023-06-13 RX ADMIN — PANTOPRAZOLE SODIUM 40 MILLIGRAM(S): 20 TABLET, DELAYED RELEASE ORAL at 05:47

## 2023-06-13 RX ADMIN — HEPARIN SODIUM 7500 UNIT(S): 5000 INJECTION INTRAVENOUS; SUBCUTANEOUS at 05:47

## 2023-06-13 RX ADMIN — Medication 2: at 22:07

## 2023-06-13 RX ADMIN — HEPARIN SODIUM 7500 UNIT(S): 5000 INJECTION INTRAVENOUS; SUBCUTANEOUS at 14:15

## 2023-06-13 RX ADMIN — INSULIN GLARGINE 25 UNIT(S): 100 INJECTION, SOLUTION SUBCUTANEOUS at 08:01

## 2023-06-13 RX ADMIN — Medication 2: at 08:01

## 2023-06-13 RX ADMIN — MIDODRINE HYDROCHLORIDE 5 MILLIGRAM(S): 2.5 TABLET ORAL at 14:15

## 2023-06-13 RX ADMIN — MIDODRINE HYDROCHLORIDE 5 MILLIGRAM(S): 2.5 TABLET ORAL at 05:47

## 2023-06-13 RX ADMIN — CEFTRIAXONE 100 MILLIGRAM(S): 500 INJECTION, POWDER, FOR SOLUTION INTRAMUSCULAR; INTRAVENOUS at 12:46

## 2023-06-13 RX ADMIN — CHLORHEXIDINE GLUCONATE 1 APPLICATION(S): 213 SOLUTION TOPICAL at 08:30

## 2023-06-13 RX ADMIN — NYSTATIN CREAM 1 APPLICATION(S): 100000 CREAM TOPICAL at 18:30

## 2023-06-13 NOTE — PROGRESS NOTE ADULT - SUBJECTIVE AND OBJECTIVE BOX
EVELYN LOPEZ is a 72yFemale , patient examined and chart reviewed.    INTERVAL HPI/ OVERNIGHT EVENTS:   NAD. Afebrile  Feels well.    PAST MEDICAL & SURGICAL HISTORY:  Hypertension  Diabetes  Lymphedema  H/O left bundle branch block  History of left bundle branch block (LBBB)  Systolic heart failure, chronic  H/O cataract  2020  History of surgical removal of meniscus of knee  left in 1971  Frozen shoulder  2000  H/O Achilles tendon repair  lengthened bilaterally, 2000  Fractured skull  1968      For details regarding the patient's social history, family history, and other miscellaneous elements, please refer the initial infectious diseases consultation and/or the admitting history and physical examination for this admission.     ROS:  CONSTITUTIONAL:  Negative fever or chills  EYES:  Negative  blurry vision or double vision  CARDIOVASCULAR:  Negative for chest pain or palpitations  RESPIRATORY:  Negative for cough, wheezing, or SOB   GASTROINTESTINAL:  Negative for nausea vomiting, diarrhea, constipation, or abdominal pain   GENITOURINARY:  Negative frequency, urgency or dysuria  NEUROLOGIC:  No headache, confusion, dizziness, lightheadedness  All other systems were reviewed and are negative     ALLERGIES  penicillins (Hives)      Current inpatient medications :    ANTIBIOTICS/RELEVANT:  cefTRIAXone   IVPB 2000 milliGRAM(s) IV Intermittent every 24 hours    MEDICATIONS  (STANDING):  aspirin  chewable 81 milliGRAM(s) Oral daily  atorvastatin 80 milliGRAM(s) Oral at bedtime  dextrose 5%. 1000 milliLiter(s) (50 mL/Hr) IV Continuous <Continuous>  dextrose 50% Injectable 50 milliLiter(s) IV Push every 15 minutes  dextrose 50% Injectable 25 milliLiter(s) IV Push every 15 minutes  heparin   Injectable 7500 Unit(s) SubCutaneous every 8 hours  insulin glargine Injectable (LANTUS) 25 Unit(s) SubCutaneous every morning  insulin lispro (ADMELOG) corrective regimen sliding scale   SubCutaneous at bedtime  insulin lispro (ADMELOG) corrective regimen sliding scale   SubCutaneous three times a day before meals  levothyroxine 75 MICROGram(s) Oral daily  metoprolol succinate ER 25 milliGRAM(s) Oral daily  midodrine 5 milliGRAM(s) Oral every 8 hours  midodrine      nystatin Powder 1 Application(s) Topical two times a day  pantoprazole    Tablet 40 milliGRAM(s) Oral before breakfast    MEDICATIONS  (PRN):  sodium chloride 0.9% lock flush 10 milliLiter(s) IV Push every 1 hour PRN Pre/post blood products, medications, blood draw, and to maintain line patency        Objective:  Vital Signs Last 24 Hrs  T(C): 36.7 (13 Jun 2023 11:41), Max: 36.8 (12 Jun 2023 18:50)  T(F): 98 (13 Jun 2023 11:41), Max: 98.2 (12 Jun 2023 18:50)  HR: 87 (13 Jun 2023 11:41) (80 - 88)  BP: 92/56 (13 Jun 2023 11:41) (82/50 - 116/45)  RR: 17 (13 Jun 2023 11:41) (16 - 18)  SpO2: 96% (13 Jun 2023 11:41) (95% - 98%)    Parameters below as of 13 Jun 2023 11:41  Patient On (Oxygen Delivery Method): room air      Physical Exam:  General: no acute distress  Neck: supple, trachea midline right IJ HD cath c/d/i  Lungs: clear, no wheeze/rhonchi  Cardiovascular: regular rate and rhythm, S1 S2  Abdomen: soft, nontender,  bowel sounds normal  Neurological: alert and oriented x3  Skin: no rash  Extremities: + edema      LABS:                        9.6    20.74 )-----------( 156      ( 13 Jun 2023 05:20 )             28.7   06-13    134<L>  |  102  |  57<H>  ----------------------------<  221<H>  4.8   |  27  |  3.20<H>    Ca    7.8<L>      13 Jun 2023 05:20  Phos  3.2     06-13  Mg     2.4     06-12      MICROBIOLOGY:  Culture - Blood (collected 11 Jun 2023 14:25)  Source: .Blood Blood  Preliminary Report (12 Jun 2023 20:02):    No growth to date.    Culture - Blood (collected 11 Jun 2023 12:40)  Source: .Blood Blood  Preliminary Report (12 Jun 2023 20:02):    No growth to date.      RADIOLOGY & ADDITIONAL STUDIES:    ACC: [id number] EXAM:  CT ABDOMEN AND PELVIS OC   ORDERED BY:  PEDRO LUIS MARTIN     PROCEDURE DATE:  06/04/2023          INTERPRETATION:  CLINICAL INFORMATION: Vomiting and abdominal pain.    COMPARISON: 4/20/2023 CT abdomen pelvis    CONTRAST/COMPLICATIONS:  IV Contrast: NONE  Oral Contrast: Omnipaque 300  Complications: None reported at time of study completion    PROCEDURE:  CT of the Abdomen and Pelvis was performed.  Sagittal and coronal reformats were performed.    FINDINGS:  LOWER CHEST: Within normal limits.    LIVER: Within normal limits.  BILE DUCTS: Normal caliber.  GALLBLADDER: Within normal limits.  SPLEEN: Within normal limits.  PANCREAS: Within normal limits.  ADRENALS: Within normal limits.  KIDNEYS/URETERS: Mild left hydronephrosis but with no stone or other   obstructing lesion identified. Suggestion of left renal pelvis and   ureteral wall thickening not well evaluated without IV contrast. Kidneys   otherwise similar to prior with mild bilateral perinephric stranding. No   concerning renal mass.    BLADDER: Nondilated. No stones or filling defects.  REPRODUCTIVE ORGANS: Small calcification in the uterus. No concerning   findings.    BOWEL: No bowel obstruction. Appendix is normal.  PERITONEUM: No ascites.  VESSELS:No abdominal aortic aneurysm. Atherosclerosis similar to prior.  RETROPERITONEUM/LYMPH NODES: No lymphadenopathy.  ABDOMINAL WALL: Within normal limits.  BONES: No acute findings. Bilateral L5 spondylolysis with mild   degenerative anterolisthesis ofL5 on S1. Prominent degenerative changes   lower lumbar spine.    IMPRESSION:  Mild left hydronephrosis is new. Left ureter not dilated. Suggestion of   left renal pelvis wall thickening not well evaluated without IV contrast.   Urinary tract infection could appear this way though not definitive.   Early or mild proximal left ureteral obstruction also possible though no   stone or other obstructing lesion is evident.    No other significant change. No bowel obstruction.    Assessment :   73yo F with PMHx IDDM2, HFrEF, HTN, hypothyroidism, LBBB, chronic lymphedema admitted with septic shock and Ecoli bacteremia sec Left pyelo with hydro- no obtructive uropathy on CT    Off pressors  Worsening DUNCAN started on HD 6/6  Rising WBC Afebrile Unclear etiology -Repeat blood cultures 6/11 ngtd  Clinically stable    Plan :   Cont Rocephin till 6/14  If persistent leukocytosis will repeat CT CAP tmrw 6/14  Fu repeat blood cultures- NGTD  HD per renal  Trend temps and cbc  Pulm toileting  Increase activity/OOB to chair  Stable from ID standpoint      Continue with present regiment.  Appropriate use of antibiotics and adverse effects reviewed.      > 35 minutes were spent in direct patient care reviewing notes, medications ,labs data/ imaging , discussion with multidisciplinary team.    Thank you for allowing me to participate in care of your patient .    Robby Clark MD  Infectious Disease  [phone]

## 2023-06-13 NOTE — PROGRESS NOTE ADULT - SUBJECTIVE AND OBJECTIVE BOX
Date of Service 06-13-23 @ 12:13    Patient is a 72y old  Female who presents with a chief complaint of sepsis (13 Jun 2023 11:31)      INTERVAL /OVERNIGHT EVENTS: significant leg swelling    MEDICATIONS  (STANDING):  aspirin  chewable 81 milliGRAM(s) Oral daily  atorvastatin 80 milliGRAM(s) Oral at bedtime  cefTRIAXone   IVPB 2000 milliGRAM(s) IV Intermittent every 24 hours  dextrose 5%. 1000 milliLiter(s) (50 mL/Hr) IV Continuous <Continuous>  dextrose 50% Injectable 50 milliLiter(s) IV Push every 15 minutes  dextrose 50% Injectable 25 milliLiter(s) IV Push every 15 minutes  heparin   Injectable 7500 Unit(s) SubCutaneous every 8 hours  insulin glargine Injectable (LANTUS) 25 Unit(s) SubCutaneous every morning  insulin lispro (ADMELOG) corrective regimen sliding scale   SubCutaneous three times a day before meals  insulin lispro (ADMELOG) corrective regimen sliding scale   SubCutaneous at bedtime  levothyroxine 75 MICROGram(s) Oral daily  metoprolol succinate  milliGRAM(s) Oral daily  midodrine      midodrine 5 milliGRAM(s) Oral every 8 hours  nystatin Powder 1 Application(s) Topical two times a day  pantoprazole    Tablet 40 milliGRAM(s) Oral before breakfast    MEDICATIONS  (PRN):  sodium chloride 0.9% lock flush 10 milliLiter(s) IV Push every 1 hour PRN Pre/post blood products, medications, blood draw, and to maintain line patency      Allergies    Ativan (Rash; Urticaria)  penicillins (Hives)    Intolerances        REVIEW OF SYSTEMS:  CONSTITUTIONAL: No fever, weight loss, or fatigue  EYES: No eye pain, visual disturbances, or discharge  ENMT:  No difficulty hearing, tinnitus, vertigo; No sinus or throat pain  NECK: No pain or stiffness  RESPIRATORY: No cough, wheezing, chills or hemoptysis; No shortness of breath  CARDIOVASCULAR: No chest pain, palpitations, dizziness, or leg swelling  GASTROINTESTINAL: No abdominal or epigastric pain. No nausea, vomiting, or hematemesis; No diarrhea or constipation. No melena or hematochezia.  GENITOURINARY: No dysuria, frequency, hematuria, or incontinence  NEUROLOGICAL: No headaches, memory loss, loss of strength, numbness, or tremors  SKIN: No itching, burning, rashes, or lesions   LYMPH NODES: No enlarged glands  ENDOCRINE: No heat or cold intolerance; No hair loss; No polydipsia or polyuria  MUSCULOSKELETAL: No joint pain or swelling; No muscle, back, or extremity pain  PSYCHIATRIC: No depression, anxiety, mood swings, or difficulty sleeping  HEME/LYMPH: No easy bruising, or bleeding gums  ALLERGY AND IMMUNOLOGIC: No hives or eczema    Vital Signs Last 24 Hrs  T(C): 36.7 (13 Jun 2023 11:41), Max: 36.8 (12 Jun 2023 12:26)  T(F): 98 (13 Jun 2023 11:41), Max: 98.2 (12 Jun 2023 12:26)  HR: 87 (13 Jun 2023 11:41) (73 - 88)  BP: 92/56 (13 Jun 2023 11:41) (92/52 - 122/53)  BP(mean): --  RR: 17 (13 Jun 2023 11:41) (13 - 29)  SpO2: 96% (13 Jun 2023 11:41) (95% - 100%)    Parameters below as of 13 Jun 2023 11:41  Patient On (Oxygen Delivery Method): room air        PHYSICAL EXAM:  GENERAL: NAD, well-groomed, well-developed  HEAD:  Atraumatic, Normocephalic  EYES: EOMI, PERRLA, conjunctiva and sclera clear  ENMT: No tonsillar erythema, exudates, or enlargement; Moist mucous membranes, Good dentition, No lesions  NECK: Supple, No JVD, Normal thyroid  NERVOUS SYSTEM:  Alert & Oriented X3, Good concentration; Motor Strength 5/5 B/L upper and lower extremities; DTRs 2+ intact and symmetric  CHEST/LUNG: Clear to auscultation bilaterally; No rales, rhonchi, wheezing, or rubs  HEART: Regular rate and rhythm; No murmurs, rubs, or gallops  ABDOMEN: Soft, Nontender, Nondistended; Bowel sounds present  EXTREMITIES:  2+ Peripheral Pulses, No clubbing, cyanosis, + edema  LYMPH: No lymphadenopathy noted  SKIN: No rashes or lesions    LABS:                        9.6    20.74 )-----------( 156      ( 13 Jun 2023 05:20 )             28.7     13 Jun 2023 05:20    134    |  102    |  57     ----------------------------<  221    4.8     |  27     |  3.20     Ca    7.8        13 Jun 2023 05:20  Phos  3.2       13 Jun 2023 05:20      PT/INR - ( 12 Jun 2023 06:25 )   PT: 14.5 sec;   INR: 1.24 ratio             CAPILLARY BLOOD GLUCOSE      POCT Blood Glucose.: 359 mg/dL (13 Jun 2023 11:36)  POCT Blood Glucose.: 224 mg/dL (13 Jun 2023 07:40)  POCT Blood Glucose.: 187 mg/dL (12 Jun 2023 22:26)  POCT Blood Glucose.: 143 mg/dL (12 Jun 2023 18:13)      RADIOLOGY & ADDITIONAL TESTS:    Notes Reviewed:  x] YES  [ ] NO    Care Discussed with Consultants/Other Providers x] YES  [ ] NO

## 2023-06-13 NOTE — PROGRESS NOTE ADULT - SUBJECTIVE AND OBJECTIVE BOX
Patient is a 72y old  Female who presents with a chief complaint of sepsis (04 Jun 2023 10:59)       HPI: female with history of insulin-dependent diabetes, CHF, hypertension who presented to the ED with nausea and vomiting for several days.  She has experienced no significant abdominal pain and history of multiple dark emesis and 1 episode of diarrhea today.  She denies fever or chills.  Denies URI symptoms.  She denies significant change in her urinary patters. CT performed on admission demonstrated mild left hydronephrosis without ureteral dilatation or obstructing calculus. Found to have DUNCAN and hypotension. Renal consulted for further eval. Has not seen a Nephrologist outpatient. Currently asymptomatic.      No acute events noted overnight, N/V resolved. Having some itching    PAST MEDICAL & SURGICAL HISTORY:  Hypertension      Diabetes      Lymphedema      H/O left bundle branch block      History of left bundle branch block (LBBB)      Systolic heart failure, chronic      H/O cataract  2020      History of surgical removal of meniscus of knee  left in 1971      Frozen shoulder  2000      H/O Achilles tendon repair  lengthened bilaterally, 2000      Fractured skull  1968         MEDICATIONS  (STANDING):  aspirin  chewable 81 milliGRAM(s) Oral daily  atorvastatin 80 milliGRAM(s) Oral at bedtime  cefTRIAXone   IVPB 2000 milliGRAM(s) IV Intermittent every 24 hours  chlorhexidine 2% Cloths 1 Application(s) Topical <User Schedule>  dextrose 50% Injectable 50 milliLiter(s) IV Push every 15 minutes  dextrose 50% Injectable 25 milliLiter(s) IV Push every 15 minutes  heparin   Injectable 7500 Unit(s) SubCutaneous every 8 hours  insulin glargine Injectable (LANTUS) 25 Unit(s) SubCutaneous every morning  insulin lispro (ADMELOG) corrective regimen sliding scale   SubCutaneous every 6 hours  levothyroxine 75 MICROGram(s) Oral daily  midodrine      midodrine 5 milliGRAM(s) Oral every 8 hours  mupirocin 2% Nasal 1 Application(s) Both Nostrils two times a day  nystatin Powder 1 Application(s) Topical two times a day  pantoprazole  Injectable 40 milliGRAM(s) IV Push daily    MEDICATIONS  (PRN):  sodium chloride 0.9% lock flush 10 milliLiter(s) IV Push every 1 hour PRN Pre/post blood products, medications, blood draw, and to maintain line patency  trimethobenzamide Injectable 200 milliGRAM(s) IntraMuscular every 8 hours PRN Nausea and/or Vomiting    FAMILY HISTORY:  Family history of CVA  mom, age 57    [place]    Social History:Non smoker        Allergies    penicillins (Hives)    Intolerances         REVIEW OF SYSTEMS:    as above  ICU Vital Signs Last 24 Hrs  T(C): 36.7 (13 Jun 2023 11:41), Max: 36.8 (12 Jun 2023 18:50)  T(F): 98 (13 Jun 2023 11:41), Max: 98.2 (12 Jun 2023 18:50)  HR: 87 (13 Jun 2023 11:41) (80 - 88)  BP: 92/56 (13 Jun 2023 11:41) (82/50 - 116/45)  BP(mean): --  ABP: --  ABP(mean): --  RR: 17 (13 Jun 2023 11:41) (16 - 18)  SpO2: 96% (13 Jun 2023 11:41) (95% - 98%)    O2 Parameters below as of 13 Jun 2023 11:41  Patient On (Oxygen Delivery Method): room air              PHYSICAL EXAM:    GENERAL: NAD  HEAD:  Atraumatic, Normocephalic  EYES: EOMI, conjunctiva and sclera clear  ENMT: No Drainage from nares, No drainage from ears  NECK: Supple, +dialysis catheter intact  NERVOUS SYSTEM:  Awake and Alert  CHEST/LUNG: Clear to percussion bilaterally; No rales, rhonchi, wheezing, or rubs  HEART: Regular rate and rhythm; No murmurs, rubs, or gallops  ABDOMEN: Soft, Nontender, Nondistended; Bowel sounds present, obese  EXTREMITIES:  + Edema  SKIN: No rashes No obvious ecchymosis      LABS:                                   9.6    20.74 )-----------( 156      ( 13 Jun 2023 05:20 )             28.7     06-13    134<L>  |  102  |  57<H>  ----------------------------<  221<H>  4.8   |  27  |  3.20<H>    Ca    7.8<L>      13 Jun 2023 05:20  Phos  3.2     06-13  Mg     2.4     06-12      PT/INR - ( 12 Jun 2023 06:25 )   PT: 14.5 sec;   INR: 1.24 ratio

## 2023-06-13 NOTE — CONSULT NOTE ADULT - SUBJECTIVE AND OBJECTIVE BOX
All records reviewed.    HPI:  72-yo woman w hxf insulin-dependent diabetes, CHF, hypertension, adm with nausea and vomiting for the last few days  Found w septic shock in ER, started on Rocephin  UA w large Lilian,, CT a/p 6/4 mild left hydro suggestive left renal pelvic wall thickening  CBC on adm 6/4 wbc 26.34 Hgb 11.4 plts 183 mcv 91.5  old lab 4/20/23 wbc 8.39 hgb 11.6 plts 275 mcv 91.4  Over next few days, pt improved clinically, WBC progressively decr to hugh 15.34 on 6/9 but then started incr again  Yesterday 6/12 wbc 20.32, plts 165, today 6/13--wbc 20.74 plts 156 79.9N 8.1L 6.5M 4Eo 0.2baso  Manual diff ~2 days ago noted by Lab 1meta 1myelo  Hgb sl decr to today 9.6  6/10  did have nonfunctioning indwelling HD catheter removed from right neck, replaced w non-tunneling catheter    Pt reports feeling very well  denies hx abnormal CBC in past  no diarrhea/rash/edema/malaise/pruritis           PAST MEDICAL & SURGICAL HISTORY:  Hypertension      Diabetes      Lymphedema      H/O left bundle branch block      History of left bundle branch block (LBBB)      Systolic heart failure, chronic      H/O cataract  2020      History of surgical removal of meniscus of knee  left in 1971      Frozen shoulder  2000      H/O Achilles tendon repair  lengthened bilaterally, 2000      Fractured skull  1968          Review of System:    MEDICATIONS  (STANDING):  aspirin  chewable 81 milliGRAM(s) Oral daily  atorvastatin 80 milliGRAM(s) Oral at bedtime  cefTRIAXone   IVPB 2000 milliGRAM(s) IV Intermittent every 24 hours  dextrose 5%. 1000 milliLiter(s) (50 mL/Hr) IV Continuous <Continuous>  dextrose 50% Injectable 50 milliLiter(s) IV Push every 15 minutes  dextrose 50% Injectable 25 milliLiter(s) IV Push every 15 minutes  heparin   Injectable 7500 Unit(s) SubCutaneous every 8 hours  insulin glargine Injectable (LANTUS) 25 Unit(s) SubCutaneous every morning  insulin lispro (ADMELOG) corrective regimen sliding scale   SubCutaneous three times a day before meals  insulin lispro (ADMELOG) corrective regimen sliding scale   SubCutaneous at bedtime  levothyroxine 75 MICROGram(s) Oral daily  metoprolol succinate  milliGRAM(s) Oral daily  midodrine      midodrine 5 milliGRAM(s) Oral every 8 hours  nystatin Powder 1 Application(s) Topical two times a day  pantoprazole    Tablet 40 milliGRAM(s) Oral before breakfast    MEDICATIONS  (PRN):  sodium chloride 0.9% lock flush 10 milliLiter(s) IV Push every 1 hour PRN Pre/post blood products, medications, blood draw, and to maintain line patency      Allergies    Ativan (Rash; Urticaria)  penicillins (Hives)    Intolerances        SOCIAL HISTORY:  never smoker, no Etoh    FAMILY HISTORY:  Family history of CVA  mom, age 57        Vital Signs Last 24 Hrs  T(C): 36.7 (13 Jun 2023 05:15), Max: 36.8 (12 Jun 2023 12:26)  T(F): 98.1 (13 Jun 2023 05:15), Max: 98.2 (12 Jun 2023 12:26)  HR: 83 (13 Jun 2023 08:00) (73 - 88)  BP: 92/52 (13 Jun 2023 08:00) (92/52 - 122/53)  BP(mean): --  RR: 17 (13 Jun 2023 08:00) (13 - 29)  SpO2: 95% (13 Jun 2023 08:00) (95% - 100%)    Parameters below as of 13 Jun 2023 08:00  Patient On (Oxygen Delivery Method): room air        PHYSICAL EXAM:      General: in no acute distress  Eyes:sclera anicteric, EOMI  ENMT:buccal mucosa moist, nose midline  Neck:supple, trachea midline  Lungs:clear, no wheeze/rhonchi  Cardiovascular:regular rate and rhythm, S1 S2  Abdomen:soft, nontender, no organomegaly present, bowel sounds normal  Neurological:alert and oriented x3, Cranial Nerves II-XII grossly intact  Skin:no increased ecchymosis/petechiae/purpura  Lymph Nodes:no palpable cervical/supraclavicular lymph nodes enlargements  Extremities:no cyanosis/clubbing/edema        LABS:                        9.6    20.74 )-----------( 156      ( 13 Jun 2023 05:20 )             28.7     06-13 @ 05:20  WBC20.74  RBC3.21 Hgb9.6 Hct28.7  MCV89.4  [id number]  Auto Jjwfjc81.9 Band-- Auto Lymph8.1 Atypical Lymph-- Reactive Lymph-- Auto Mono6.5 Auto Eos4.0 Auto Baso0.2        06-12 @ 06:25  WBC19.69  RBC3.35 Hgb9.9 Hct29.7  MCV88.7  [id number]  Auto Neutro-- Band-- Auto Lymph-- Atypical Lymph-- Reactive Lymph-- Auto Mono-- Auto Eos-- Auto Baso--        06-10 @ 15:31  WBC20.32  RBC3.49 Hgb10.4 Hct30.8  MCV88.3  [id number]  Auto Neutro-- Band-- Auto Lymph-- Atypical Lymph-- Reactive Lymph-- Auto Mono-- Auto Eos-- Auto Baso--        06-10 @ 08:10  WBC19.35  RBC3.53 Hgb10.3 Hct31.2  MCV88.4  [id number]  Auto Nefrmo00.4 Band-- Auto Lymph5.7 Atypical Lymph-- Reactive Lymph-- Auto Mono9.1 Auto Eos4.8 Auto Baso0.3        06-09 @ 05:58  WBC15.34  RBC3.50 Hgb10.2 Hct31.0  MCV88.6  [id number]  Auto Izolaf77.0 Band4.0 Auto Lymph3.0 Atypical Lymph-- Reactive Lymph-- Auto Mono8.0 Auto Eos3.0 Auto Baso0.0          06-13    134<L>  |  102  |  57<H>  ----------------------------<  221<H>  4.8   |  27  |  3.20<H>    Ca    7.8<L>      13 Jun 2023 05:20  Phos  3.2     06-13  Mg     2.4     06-12 06-12 @ 06:25  PT14.5 INR1.24  PTT--  06-10 @ 08:10  PT14.9 INR1.27  PTT29.2        PERIPHERAL BLOOD SMEAR REVIEW:  RBC-normocytic, normochromic, no significant anisocytosis or poikilocytosis. No rouleaux/schistocytes or increased polychromasia.  WBC-increased in number but normal in differential and morphology, majority are mature segmented neutrophils, no immature or abnormal cells seen.  Platelets-adequate on smear, no platelets clumping or giant platelets.       RADIOLOGY & ADDITIONAL STUDIES:

## 2023-06-13 NOTE — DIETITIAN NUTRITION RISK NOTIFICATION - TREATMENT: THE FOLLOWING DIET HAS BEEN RECOMMENDED
Diet, Consistent Carbohydrate Renal/No Snacks:   1500mL Fluid Restriction ([id number]) (06-12-23 @ 11:14) [Pending Verification By Attending]  Diet, Consistent Carbohydrate/No Snacks:   For patients receiving Renal Replacement - No Protein Restr, No Conc K, No Conc Phos, Low Sodium (RENAL) (06-08-23 @ 12:34) [Active]

## 2023-06-13 NOTE — CONSULT NOTE ADULT - SUBJECTIVE AND OBJECTIVE BOX
DOCUMENTATION IN PROGRESS      CHIEF COMPLAINT: Patient is a 72y old  Female who presents with a chief complaint of sepsis (13 Jun 2023 11:31)    HPI:  72-year-old female with history of insulin-dependent diabetes, CHF, hypertension who is presenting with nausea and vomiting for the last few days states no significant abdominal pain and history of multiple dark emesis and 1 episode of diarrhea today.  Denies fever or chills.  Denies URI symptoms.  States she was taking her insulin except today.  In ER patient was found to be in septic shock.  patient is being admitted for further work up and treatment.   (04 Jun 2023 10:59)      EKG: SR 1st deg AVB with LBBB    REVIEW OF SYSTEMS:   All other review of systems are negative unless indicated above    PAST MEDICAL & SURGICAL HISTORY:  Hypertension      Diabetes      Lymphedema      H/O left bundle branch block      History of left bundle branch block (LBBB)      Systolic heart failure, chronic      H/O cataract  2020      History of surgical removal of meniscus of knee  left in 1971      Frozen shoulder  2000      H/O Achilles tendon repair  lengthened bilaterally, 2000      Fractured skull  1968          SOCIAL HISTORY:  No tobacco, ethanol, or drug abuse.    FAMILY HISTORY:  Family history of CVA  mom, age 57      No family history of acute MI or sudden cardiac death.    MEDICATIONS  (STANDING):  aspirin  chewable 81 milliGRAM(s) Oral daily  atorvastatin 80 milliGRAM(s) Oral at bedtime  dextrose 5%. 1000 milliLiter(s) (50 mL/Hr) IV Continuous <Continuous>  dextrose 50% Injectable 50 milliLiter(s) IV Push every 15 minutes  dextrose 50% Injectable 25 milliLiter(s) IV Push every 15 minutes  heparin   Injectable 7500 Unit(s) SubCutaneous every 8 hours  insulin glargine Injectable (LANTUS) 25 Unit(s) SubCutaneous every morning  insulin lispro (ADMELOG) corrective regimen sliding scale   SubCutaneous three times a day before meals  insulin lispro (ADMELOG) corrective regimen sliding scale   SubCutaneous at bedtime  levothyroxine 75 MICROGram(s) Oral daily  metoprolol succinate ER 25 milliGRAM(s) Oral daily  midodrine      midodrine 5 milliGRAM(s) Oral every 8 hours  nystatin Powder 1 Application(s) Topical two times a day  pantoprazole    Tablet 40 milliGRAM(s) Oral before breakfast    MEDICATIONS  (PRN):  sodium chloride 0.9% lock flush 10 milliLiter(s) IV Push every 1 hour PRN Pre/post blood products, medications, blood draw, and to maintain line patency      Allergies    Ativan (Rash; Urticaria)  penicillins (Hives)    Intolerances        Home meds:  Home Medications:  aspirin 81 mg oral tablet: 1 tab(s) orally once a day (04 Jun 2023 10:33)  atorvastatin 80 mg oral tablet: 1 tab(s) orally once a day (04 Jun 2023 10:27)  Entresto 24 mg-26 mg oral tablet: 1 tab(s) orally 2 times a day (04 Jun 2023 10:27)  eplerenone 25 mg oral tablet: 1 tab(s) orally once a day (04 Jun 2023 10:27)  Lantus 100 units/mL subcutaneous solution: 37 unit(s) subcutaneous once a day (at bedtime) (04 Jun 2023 10:33)  levothyroxine 75 mcg (0.075 mg) oral tablet: 1 tab(s) orally once a day (04 Jun 2023 10:33)  NovoLOG 100 units/mL subcutaneous solution: subcutaneous Sliding scale MDD 40units (04 Jun 2023 10:33)  Toprol- mg oral tablet, extended release: 1 tab(s) orally once a day (04 Jun 2023 10:33)  torsemide 20 mg oral tablet: 1 tab(s) orally 2 times a day (04 Jun 2023 10:36)        VITAL SIGNS:   Vital Signs Last 24 Hrs  T(C): 36.7 (13 Jun 2023 11:41), Max: 36.8 (12 Jun 2023 18:50)  T(F): 98 (13 Jun 2023 11:41), Max: 98.2 (12 Jun 2023 18:50)  HR: 87 (13 Jun 2023 11:41) (75 - 88)  BP: 92/56 (13 Jun 2023 11:41) (82/50 - 122/53)  BP(mean): --  RR: 17 (13 Jun 2023 11:41) (14 - 29)  SpO2: 96% (13 Jun 2023 11:41) (95% - 100%)    Parameters below as of 13 Jun 2023 11:41  Patient On (Oxygen Delivery Method): room air        I&O's Summary    12 Jun 2023 07:01  -  13 Jun 2023 07:00  --------------------------------------------------------  IN: 240 mL / OUT: 1000 mL / NET: -760 mL        On Exam:  TELE:   Constitutional: NAD, awake and alert  HEENT: Moist Mucous Membranes, Anicteric  Pulmonary: Non-labored, breath sounds are clear bilaterally, No wheezing, rales or rhonchi  Cardiovascular: Regular, S1 and S2, No murmurs, rubs, gallops or clicks  Gastrointestinal: Bowel Sounds present, soft, nontender.   Lymph: No peripheral edema. No lymphadenopathy.  Skin: No visible rashes or ulcers.  Psych:  Mood & affect appropriate for situation    LABS: All Labs Reviewed:                        9.6    20.74 )-----------( 156      ( 13 Jun 2023 05:20 )             28.7                         9.9    19.69 )-----------( 207      ( 12 Jun 2023 06:25 )             29.7                         10.4   20.32 )-----------( 165      ( 10 Frederic 2023 15:31 )             30.8     13 Jun 2023 05:20    134    |  102    |  57     ----------------------------<  221    4.8     |  27     |  3.20   12 Jun 2023 06:25    134    |  100    |  84     ----------------------------<  241    3.8     |  26     |  4.10     Ca    7.8        13 Jun 2023 05:20  Ca    7.7        12 Jun 2023 06:25  Phos  3.2       13 Jun 2023 05:20  Phos  3.4       12 Jun 2023 06:25  Mg     2.4       12 Jun 2023 06:25      PT/INR - ( 12 Jun 2023 06:25 )   PT: 14.5 sec;   INR: 1.24 ratio               Blood Culture: Organism --  Gram Stain Blood -- Gram Stain --  Specimen Source .Blood Blood  Culture-Blood --    Organism --  Gram Stain Blood -- Gram Stain --  Specimen Source .Blood Blood  Culture-Blood --            RADIOLOGY:              CHIEF COMPLAINT: Patient is a 72y old  Female who presents with a chief complaint of sepsis (13 Jun 2023 11:31)    HPI:  72-year-old female with history of insulin-dependent diabetes, CHF, hypertension who is presenting with nausea and vomiting for the last few days states no significant abdominal pain and history of multiple dark emesis and 1 episode of diarrhea today.  Denies fever or chills.  Denies URI symptoms.  States she was taking her insulin except today.   In ER patient was found to be in septic shock.patient is being admitted for further work up and treatment. (04 Jun 2023 10:59)    EKG: SR 1st deg AVB with LBBB    REVIEW OF SYSTEMS:   All other review of systems are negative unless indicated above    PAST MEDICAL & SURGICAL HISTORY:  Hypertension      Diabetes      Lymphedema      H/O left bundle branch block      History of left bundle branch block (LBBB)      Systolic heart failure, chronic      H/O cataract  2020      History of surgical removal of meniscus of knee  left in 1971      Frozen shoulder  2000      H/O Achilles tendon repair  lengthened bilaterally, 2000      Fractured skull  1968          SOCIAL HISTORY:  No tobacco, ethanol, or drug abuse.    FAMILY HISTORY:  Family history of CVA  mom, age 57      No family history of acute MI or sudden cardiac death.    MEDICATIONS  (STANDING):  aspirin  chewable 81 milliGRAM(s) Oral daily  atorvastatin 80 milliGRAM(s) Oral at bedtime  dextrose 5%. 1000 milliLiter(s) (50 mL/Hr) IV Continuous <Continuous>  dextrose 50% Injectable 50 milliLiter(s) IV Push every 15 minutes  dextrose 50% Injectable 25 milliLiter(s) IV Push every 15 minutes  heparin   Injectable 7500 Unit(s) SubCutaneous every 8 hours  insulin glargine Injectable (LANTUS) 25 Unit(s) SubCutaneous every morning  insulin lispro (ADMELOG) corrective regimen sliding scale   SubCutaneous three times a day before meals  insulin lispro (ADMELOG) corrective regimen sliding scale   SubCutaneous at bedtime  levothyroxine 75 MICROGram(s) Oral daily  metoprolol succinate ER 25 milliGRAM(s) Oral daily  midodrine      midodrine 5 milliGRAM(s) Oral every 8 hours  nystatin Powder 1 Application(s) Topical two times a day  pantoprazole    Tablet 40 milliGRAM(s) Oral before breakfast    MEDICATIONS  (PRN):  sodium chloride 0.9% lock flush 10 milliLiter(s) IV Push every 1 hour PRN Pre/post blood products, medications, blood draw, and to maintain line patency      Allergies    Ativan (Rash; Urticaria)  penicillins (Hives)    Intolerances        Home meds:  Home Medications:  aspirin 81 mg oral tablet: 1 tab(s) orally once a day (04 Jun 2023 10:33)  atorvastatin 80 mg oral tablet: 1 tab(s) orally once a day (04 Jun 2023 10:27)  Entresto 24 mg-26 mg oral tablet: 1 tab(s) orally 2 times a day (04 Jun 2023 10:27)  eplerenone 25 mg oral tablet: 1 tab(s) orally once a day (04 Jun 2023 10:27)  Lantus 100 units/mL subcutaneous solution: 37 unit(s) subcutaneous once a day (at bedtime) (04 Jun 2023 10:33)  levothyroxine 75 mcg (0.075 mg) oral tablet: 1 tab(s) orally once a day (04 Jun 2023 10:33)  NovoLOG 100 units/mL subcutaneous solution: subcutaneous Sliding scale MDD 40units (04 Jun 2023 10:33)  Toprol- mg oral tablet, extended release: 1 tab(s) orally once a day (04 Jun 2023 10:33)  torsemide 20 mg oral tablet: 1 tab(s) orally 2 times a day (04 Jun 2023 10:36)        VITAL SIGNS:   Vital Signs Last 24 Hrs  T(C): 36.7 (13 Jun 2023 11:41), Max: 36.8 (12 Jun 2023 18:50)  T(F): 98 (13 Jun 2023 11:41), Max: 98.2 (12 Jun 2023 18:50)  HR: 87 (13 Jun 2023 11:41) (75 - 88)  BP: 92/56 (13 Jun 2023 11:41) (82/50 - 122/53)  BP(mean): --  RR: 17 (13 Jun 2023 11:41) (14 - 29)  SpO2: 96% (13 Jun 2023 11:41) (95% - 100%)    Parameters below as of 13 Jun 2023 11:41  Patient On (Oxygen Delivery Method): room air        I&O's Summary    12 Jun 2023 07:01  -  13 Jun 2023 07:00  --------------------------------------------------------  IN: 240 mL / OUT: 1000 mL / NET: -760 mL        On Exam:  TELE: Not on telemetry   Constitutional: NAD, awake and alert, obese  HEENT: Moist Mucous Membranes, Anicteric  Pulmonary: Non-labored, breath sounds are clear bilaterally, No wheezing, rales or rhonchi  Cardiovascular: Regular, S1 and S2, + murmurs, rubs, gallops or clicks  Gastrointestinal: Bowel Sounds present, soft, nontender.   Lymph: + peripheral edema. No lymphadenopathy.  Skin: No visible rashes or ulcers. [facility] cath   Psych:  Mood & affect appropriate for situation    LABS: All Labs Reviewed:                        9.6 20.74 )-----------( 156      ( 13 Jun 2023 05:20 )             28.7                         9.9    19.69 )-----------( 207      ( 12 Jun 2023 06:25 )             29.7                         10.4   20.32 )-----------( 165      ( 10 Frederic 2023 15:31 )             30.8     13 Jun 2023 05:20    134    |  102    |  57     ----------------------------<  221    4.8     |  27     |  3.20   12 Jun 2023 06:25    134    |  100    |  84     ----------------------------<  241    3.8     |  26     |  4.10     Ca    7.8        13 Jun 2023 05:20  Ca    7.7        12 Jun 2023 06:25  Phos  3.2       13 Jun 2023 05:20  Phos  3.4       12 Jun 2023 06:25  Mg     2.4       12 Jun 2023 06:25      PT/INR - ( 12 Jun 2023 06:25 )   PT: 14.5 sec;   INR: 1.24 ratio               Blood Culture: Organism --  Gram Stain Blood -- Gram Stain --  Specimen Source .Blood Blood  Culture-Blood --    Organism --  Gram Stain Blood -- Gram Stain --  Specimen Source .Blood Blood  Culture-Blood --            RADIOLOGY:

## 2023-06-13 NOTE — CONSULT NOTE ADULT - ASSESSMENT
71yo F with PMHx IDDM2, non obstructive CAD,  HFrEF, HTN, hypothyroidism, LBBB, chronic lymphedema presenting with decreased appetite and multiple episodes of emesis, admitted in the ICU for septic shock 2/2 UTI.    Hypotension  - a/f septic shock requiring ICU level of care, on pressor (now weaned off), worsening renal indices on HD, now on GMF     - BP has been soft 110-120's, with sig drop today while working with PT 80-90's systolics  - HR controlled 80's   - + orthostatic , asymptomatic   - known hx of non obstructive CAD from cardiac cath (7/2022), HFrEF   - had received fluid boluses on this admission, ok with gentle hydration with caution   - on midodrine 5mg PO TID, would increase to 10 mg   - continue to hold home BB, ARNI   - continue to monitor hemodynamics    - H/H 9.6/28.7, hem/onc consulted  - transfuse PRN to keep hgb>8 given cardiac hx.    - TTE: (06.06.23) LVSF mildly EF 38 %. Moderate aortic stenosis. Trace MR, TR, The inferior vena cava is normal measuring 1.86 cm in diameter  - no sign of volume overload, no O2 requirement  - tolerating HD session with volume removal ~ 1 Liter per session, renal following     - EKG : SR 1st deg AVB LBBB, unchanged from prior read   - no anginal complaints    - +UTI on abx, per primary   - Monitor and replete Lytes. Keep K > 4 and Mg > 2  - Will continue to follow.    SIERRA GarciaOLIVER  Nurse Practitioner - Cardiology   Spectra #[phone]/ [phone]  call TEAMS             71yo F with PMHx IDDM2, non obstructive CAD,  HFrEF, HTN, hypothyroidism, LBBB, chronic lymphedema presenting with decreased appetite and multiple episodes of emesis, admitted in the ICU for septic shock 2/2 UTI.    Hypotension  - a/f septic shock requiring ICU level of care, on pressor (now weaned off), worsening renal indices on HD, now on GMF     - BP has been soft 110-120's, with sig drop today while working with PT 80-90's systolics  - HR controlled 80's   - + orthostatic , asymptomatic   - known hx of non obstructive CAD from cardiac cath (7/2022), HFrEF   - had received fluid boluses on this admission, ok with gentle hydration with caution   - on midodrine 5mg PO TID, can increase to 10 mg   - continue to hold home BB, ARNI   - continue to monitor hemodynamics    - H/H 9.6/28.7, hem/onc consulted  - transfuse PRN to keep hgb>8 given cardiac hx.    - TTE: (06.06.23) LVSF mildly EF 38 %. Moderate aortic stenosis. Trace MR, TR, The inferior vena cava is normal measuring 1.86 cm in diameter  - no sign of volume overload, no O2 requirement  - tolerating HD session with volume removal ~ 1 Liter per session, renal following     - EKG : SR 1st deg AVB LBBB, unchanged from prior read   - no anginal complaints    - +UTI on abx, per primary   - Monitor and replete Lytes. Keep K > 4 and Mg > 2  - Will continue to follow.    SIERRA GarciaOLIVER  Nurse Practitioner - Cardiology   Spectra #[phone]/ [phone]  call TEAMS

## 2023-06-13 NOTE — PROGRESS NOTE ADULT - ASSESSMENT
Acute on CKD Stage 4  Sepsis/UTI  L Hydronephrosis  Uremia      -DUNCAN from sepsis/hypotension and renal hypoperfusion; unilateral mild hydro should not cause this degree of DUNCAN  -Abx, renal dosing  -Urology eval noted  -Monitor chemistries and urine output  -Renal indices worsened with uremic symptoms leading to dialysis; still urinating, but still documented < 1L  -Consented for dialysis  -Discussed with patient risks and benefits of dialysis in depth up to and including death, she agrees to dialysis if necessary  -Initiated dialysis 6/6/23;  -Replaced Palomo  6/12/23 by IR. Plan for Next HD tomorrow  -Monitor for recovery but only urinated once today      Thank you

## 2023-06-13 NOTE — CONSULT NOTE ADULT - ASSESSMENT
72-yo woman w hxf insulin-dependent diabetes, CHF, hypertension, adm with nausea and vomiting for the last few days  Found w septic shock in ER, started on Rocephin  UA w large Lilian,, CT a/p 6/4 mild left hydro suggestive left renal pelvic wall thickening  CBC on adm 6/4 wbc 26.34 Hgb 11.4 plts 183 mcv 91.5Oold lab 4/20/23 wbc 8.39 hgb 11.6 plts 275 mcv 91.4    Over next few days, pt improved and now feeling well   WBC initially progressively decr to hugh 15.34 on 6/9 but then started incr again  Yesterday 6/12 wbc 20.32, plts 165, today 6/13--wbc 20.74 plts 156 79.9N 8.1L 6.5M 4Eo 0.2baso  Manual diff ~2 days ago noted by Lab 1meta 1myelo  Hgb sl decr to today 9.6  6/10  did have nonfunctioning indwelling HD catheter removed from right neck, replaced w non-tunneling catheter    -review of bibaina blood w normal WBC morphology , no immature or early forms seen. The Lab had noted 1meta and 1myelo  a few days ago(?reactive demargination vs artifact)  -etiology of leukocytosis likely reactive, had been decreasing until 6/906/10 then started incr again in spite clinically continue to improve from sepsis. ?correlated w time of HD catheter change.  -WBC yesterday ndn today appear stabilized again, pt asx  -no acute Heme intervention needed  -will see pt in office after discharge when at steady state/recovered from acute illness    =lower Hgb likely due to acute illness, chronic ds, w poor response during stress, iatrogenic blood loss. No acute intervention needed at ths level, pt also asx    thank you  please call if any questions  discussed w medicine

## 2023-06-13 NOTE — PROGRESS NOTE ADULT - ASSESSMENT
72-year-old female with history of insulin-dependent diabetes, CHF, hypertension who is presenting with nausea and vomiting for the last few days states no significant abdominal pain and history of multiple dark emesis and 1 episode of diarrhea today.  Denies fever or chills.  Denies URI symptoms. 100

## 2023-06-13 NOTE — CONSULT NOTE ADULT - NS ATTEND AMEND GEN_ALL_CORE FT
septic shock in the setting of UTI, s/p icu stay  now with hypotension, on midodrine  cont HD per Renal  echo with mod as and lv dysfunction  chronic edema, which is better than usual  cont to monitor BP closely

## 2023-06-14 DIAGNOSIS — E10.9 TYPE 1 DIABETES MELLITUS WITHOUT COMPLICATIONS: ICD-10-CM

## 2023-06-14 LAB
ANION GAP SERPL CALC-SCNC: 9 MMOL/L — SIGNIFICANT CHANGE UP (ref 5–17)
BUN SERPL-MCNC: 66 MG/DL — HIGH (ref 7–23)
CALCIUM SERPL-MCNC: 7.9 MG/DL — LOW (ref 8.5–10.1)
CHLORIDE SERPL-SCNC: 99 MMOL/L — SIGNIFICANT CHANGE UP (ref 96–108)
CO2 SERPL-SCNC: 25 MMOL/L — SIGNIFICANT CHANGE UP (ref 22–31)
CREAT SERPL-MCNC: 4.1 MG/DL — HIGH (ref 0.5–1.3)
EGFR: 11 ML/MIN/1.73M2 — LOW
GLUCOSE SERPL-MCNC: 383 MG/DL — HIGH (ref 70–99)
HCT VFR BLD CALC: 27.7 % — LOW (ref 34.5–45)
HGB BLD-MCNC: 8.9 G/DL — LOW (ref 11.5–15.5)
MCHC RBC-ENTMCNC: 29.4 PG — SIGNIFICANT CHANGE UP (ref 27–34)
MCHC RBC-ENTMCNC: 32.1 GM/DL — SIGNIFICANT CHANGE UP (ref 32–36)
MCV RBC AUTO: 91.4 FL — SIGNIFICANT CHANGE UP (ref 80–100)
NRBC # BLD: 0 /100 WBCS — SIGNIFICANT CHANGE UP (ref 0–0)
PLATELET # BLD AUTO: 202 K/UL — SIGNIFICANT CHANGE UP (ref 150–400)
POTASSIUM SERPL-MCNC: 5.5 MMOL/L — HIGH (ref 3.5–5.3)
POTASSIUM SERPL-SCNC: 5.5 MMOL/L — HIGH (ref 3.5–5.3)
RBC # BLD: 3.03 M/UL — LOW (ref 3.8–5.2)
RBC # FLD: 14.2 % — SIGNIFICANT CHANGE UP (ref 10.3–14.5)
SODIUM SERPL-SCNC: 133 MMOL/L — LOW (ref 135–145)
WBC # BLD: 16.87 K/UL — HIGH (ref 3.8–10.5)
WBC # FLD AUTO: 16.87 K/UL — HIGH (ref 3.8–10.5)

## 2023-06-14 PROCEDURE — 99222 1ST HOSP IP/OBS MODERATE 55: CPT

## 2023-06-14 PROCEDURE — 99233 SBSQ HOSP IP/OBS HIGH 50: CPT

## 2023-06-14 RX ORDER — INSULIN LISPRO 100/ML
VIAL (ML) SUBCUTANEOUS AT BEDTIME
Refills: 0 | Status: DISCONTINUED | OUTPATIENT
Start: 2023-06-14 | End: 2023-06-19

## 2023-06-14 RX ORDER — INSULIN LISPRO 100/ML
VIAL (ML) SUBCUTANEOUS
Refills: 0 | Status: DISCONTINUED | OUTPATIENT
Start: 2023-06-14 | End: 2023-06-19

## 2023-06-14 RX ORDER — INSULIN LISPRO 100/ML
5 VIAL (ML) SUBCUTANEOUS
Refills: 0 | Status: DISCONTINUED | OUTPATIENT
Start: 2023-06-14 | End: 2023-06-19

## 2023-06-14 RX ORDER — ERYTHROPOIETIN 10000 [IU]/ML
10000 INJECTION, SOLUTION INTRAVENOUS; SUBCUTANEOUS
Refills: 0 | Status: DISCONTINUED | OUTPATIENT
Start: 2023-06-14 | End: 2023-06-19

## 2023-06-14 RX ORDER — INSULIN GLARGINE 100 [IU]/ML
30 INJECTION, SOLUTION SUBCUTANEOUS EVERY MORNING
Refills: 0 | Status: DISCONTINUED | OUTPATIENT
Start: 2023-06-14 | End: 2023-06-19

## 2023-06-14 RX ADMIN — Medication 6: at 12:31

## 2023-06-14 RX ADMIN — CEFTRIAXONE 100 MILLIGRAM(S): 500 INJECTION, POWDER, FOR SOLUTION INTRAMUSCULAR; INTRAVENOUS at 12:41

## 2023-06-14 RX ADMIN — ATORVASTATIN CALCIUM 80 MILLIGRAM(S): 80 TABLET, FILM COATED ORAL at 21:51

## 2023-06-14 RX ADMIN — MIDODRINE HYDROCHLORIDE 5 MILLIGRAM(S): 2.5 TABLET ORAL at 18:07

## 2023-06-14 RX ADMIN — HEPARIN SODIUM 7500 UNIT(S): 5000 INJECTION INTRAVENOUS; SUBCUTANEOUS at 06:42

## 2023-06-14 RX ADMIN — PANTOPRAZOLE SODIUM 40 MILLIGRAM(S): 20 TABLET, DELAYED RELEASE ORAL at 06:22

## 2023-06-14 RX ADMIN — Medication 5 UNIT(S): at 12:31

## 2023-06-14 RX ADMIN — HEPARIN SODIUM 7500 UNIT(S): 5000 INJECTION INTRAVENOUS; SUBCUTANEOUS at 21:52

## 2023-06-14 RX ADMIN — HEPARIN SODIUM 7500 UNIT(S): 5000 INJECTION INTRAVENOUS; SUBCUTANEOUS at 18:07

## 2023-06-14 RX ADMIN — Medication 81 MILLIGRAM(S): at 11:27

## 2023-06-14 RX ADMIN — ERYTHROPOIETIN 10000 UNIT(S): 10000 INJECTION, SOLUTION INTRAVENOUS; SUBCUTANEOUS at 14:15

## 2023-06-14 RX ADMIN — Medication 75 MICROGRAM(S): at 06:23

## 2023-06-14 RX ADMIN — MIDODRINE HYDROCHLORIDE 5 MILLIGRAM(S): 2.5 TABLET ORAL at 21:51

## 2023-06-14 RX ADMIN — INSULIN GLARGINE 30 UNIT(S): 100 INJECTION, SOLUTION SUBCUTANEOUS at 08:54

## 2023-06-14 RX ADMIN — Medication 10: at 08:26

## 2023-06-14 RX ADMIN — Medication 5 UNIT(S): at 17:56

## 2023-06-14 RX ADMIN — Medication 5 UNIT(S): at 08:28

## 2023-06-14 RX ADMIN — MIDODRINE HYDROCHLORIDE 5 MILLIGRAM(S): 2.5 TABLET ORAL at 06:32

## 2023-06-14 RX ADMIN — NYSTATIN CREAM 1 APPLICATION(S): 100000 CREAM TOPICAL at 06:43

## 2023-06-14 NOTE — CONSULT NOTE ADULT - CONSULT REASON
Septic shock
Sepsis sec UTI
hypotension
UTI/Sepsis
DUNCAN on CKD
dm2 uncontrolled
leukocytosis
72y A1C with Estimated Average Glucose Result: 8.2 % (06-05-23 @ 06:10)   diabetes mellitus uncontrolled type 1

## 2023-06-14 NOTE — PROGRESS NOTE ADULT - ASSESSMENT
Acute on CKD Stage 4  Sepsis/UTI  L Hydronephrosis  Uremia      -DUNCAN from sepsis/hypotension and renal hypoperfusion; unilateral mild hydro should not cause this degree of DUNCAN  -Abx, renal dosing  -Urology eval noted  -Monitor chemistries and urine output  -Renal indices previously worsened with uremic symptoms leading to dialysis; still urinating, but still documented < 1L  -Previously Consented for dialysis  -Discussed with patient risks and benefits of dialysis in depth up to and including death, she agreed to dialysis if necessary  -Initiated dialysis 6/6/23;  -Replaced Palomo 6/12/23 by IR. Plan for HD today 6/14/23; maintain MWF for now  -Monitor for recovery but urine output remains low; will monitor trend of creatinine on repeat labs; will follow up    SW for outpatient dialysis arrangements. We will be able to maintain follow up at [facility], [facility], [facility], [facility], [facility], [facility], [facility]    Thank you Acute on CKD Stage 4  Sepsis/UTI  L Hydronephrosis  Uremia      -DUNCAN from sepsis/hypotension and renal hypoperfusion; unilateral mild hydro should not cause this degree of DUNCAN  -Abx, renal dosing  -Urology eval noted  -Monitor chemistries and urine output  -Renal indices previously worsened with uremic symptoms leading to dialysis; still urinating, but still documented < 1L  -Previously Consented for dialysis  -Discussed with patient risks and benefits of dialysis in depth up to and including death, she agreed to dialysis if necessary  -Initiated dialysis 6/6/23;  -Replaced Palomo 6/12/23 by IR. Plan for HD today 6/14/23; maintain MWF for now  -Monitor for recovery but urine output remains low; will monitor trend of creatinine on repeat labs; will follow up. If Cr is stable or improving, will hold HD and monitor.    DC planning per primary team    SW for outpatient dialysis arrangements. We will be able to maintain follow up at [facility], [facility], [facility], [facility], [facility], [facility], [facility]    Thank you Acute on CKD Stage 4  Sepsis/UTI  L Hydronephrosis  Uremia      -DUNCAN from sepsis/hypotension and renal hypoperfusion; unilateral mild hydro should not cause this degree of DUNCAN  -Abx, renal dosing  -Urology eval noted  -Monitor chemistries and urine output  -Renal indices previously worsened with uremic symptoms leading to dialysis; still urinating, but still documented < 1L  -Previously Consented for dialysis  -Discussed with patient risks and benefits of dialysis in depth up to and including death, she agreed to dialysis if necessary  -Initiated dialysis 6/6/23;  -Replaced Palomo 6/12/23 by IR. Plan for HD today 6/14/23; maintain MWF for now  -Monitor for recovery but urine output remains low; will monitor trend of creatinine on repeat labs; will follow up. If Cr is stable or improving, will hold HD and monitor.  -Plan for PC when cleared by ID    SW for outpatient dialysis arrangements. We will be able to maintain follow up at [facility], [facility], [facility], [facility], [facility], [facility], [facility]    Thank you

## 2023-06-14 NOTE — CONSULT NOTE ADULT - PROBLEM SELECTOR RECOMMENDATION 9
cont lantus 30 units qhs  cont admelog 5 units 3x/day before meals  change mod dose admelog corrective scale coverage qac/qhs  goal bg 100-180 in hosp setting

## 2023-06-14 NOTE — PROGRESS NOTE ADULT - ASSESSMENT
73yo F with PMHx IDDM2, non obstructive CAD,  HFrEF, HTN, hypothyroidism, LBBB, chronic lymphedema presenting with decreased appetite and multiple episodes of emesis, admitted in the ICU for septic shock 2/2 UTI.    Hypotension, Non Obs CAD, HFrEF, HTN, LBBB, Chr Lymphedema  - Pt a/w septic shock requiring ICU level of care, on pressor (now weaned off), worsening renal indices on HD, now on GMF   - BP has been soft 110-120's, with sig drop 6/13 while working with PT 80-90's systolics.   - BP now in 90s   - + orthostatic , asymptomatic   - on midodrine 5mg PO TID, can increase to 10 mg   - Continue to hold home BB, ARNI.      - known hx of non obstructive CAD from cardiac cath (7/2022), HFrEF   - EKG : SR 1st deg AVB LBBB, unchanged from prior read   - No evidence of any active ischemia   - Continue aspirin and Lipitor     - TTE: (06.06.23) LVSF mildly EF 38 %. Moderate aortic stenosis. Trace MR, TR, The inferior vena cava is normal measuring 1.86 cm in diameter  - Renal following, HD per renal.   - LE edema improving   - +UTI on abx, per primary     - Anemia, Heme/onc consulted  - Transfuse PRN to keep hgb>8 given cardiac hx.    - Monitor and replete lytes, keep K>4, Mg>2.  - Will continue to follow.    Jose E Felipe, MS FNP, [facility]  Nurse Practitioner- Cardiology   Spectra #[phone]

## 2023-06-14 NOTE — CONSULT NOTE ADULT - ASSESSMENT
Physical Exam:   Vital Signs Last 24 Hrs  T(C): 36.6 (14 Jun 2023 05:15), Max: 36.8 (13 Jun 2023 20:00)  T(F): 97.9 (14 Jun 2023 05:15), Max: 98.2 (13 Jun 2023 20:00)  HR: 87 (14 Jun 2023 05:15) (82 - 87)  BP: 95/57 (14 Jun 2023 05:15) (82/50 - 116/54)  BP(mean): --  RR: 18 (14 Jun 2023 05:15) (17 - 18)  SpO2: 92% (14 Jun 2023 05:15) (92% - 97%)    Parameters below as of 14 Jun 2023 05:15  Patient On (Oxygen Delivery Method): room air             CAPILLARY BLOOD GLUCOSE      POCT Blood Glucose.: 394 mg/dL (14 Jun 2023 08:20)  POCT Blood Glucose.: 419 mg/dL (14 Jun 2023 08:09)  POCT Blood Glucose.: 319 mg/dL (13 Jun 2023 21:36)  POCT Blood Glucose.: 360 mg/dL (13 Jun 2023 17:12)  POCT Blood Glucose.: 359 mg/dL (13 Jun 2023 11:36)      Cholesterol, Serum: 113 mg/dL (05.19.21 @ 08:36)     HDL Cholesterol, Serum: 22 mg/dL (05.19.21 @ 08:36)     LDL Cholesterol Calculated: 66 mg/dL (05.19.21 @ 08:36)     DIET: CC  >50%

## 2023-06-14 NOTE — PROGRESS NOTE ADULT - SUBJECTIVE AND OBJECTIVE BOX
Patient is a 72y old  Female who presents with a chief complaint of sepsis (14 Jun 2023 11:16)  pt states she feels well, other than mild fatigue      INTERVAL HPI/OVERNIGHT EVENTS:  T(C): 36.6 (06-14-23 @ 05:15), Max: 36.8 (06-13-23 @ 20:00)  HR: 87 (06-14-23 @ 05:15) (82 - 87)  BP: 95/57 (06-14-23 @ 05:15) (95/57 - 116/54)  RR: 18 (06-14-23 @ 05:15) (18 - 18)  SpO2: 92% (06-14-23 @ 05:15) (92% - 97%)  Wt(kg): --  I&O's Summary    13 Jun 2023 07:01  -  14 Jun 2023 07:00  --------------------------------------------------------  IN: 770 mL / OUT: 200 mL / NET: 570 mL        LABS:                        8.9    16.87 )-----------( 202      ( 14 Jun 2023 06:03 )             27.7     06-14    133<L>  |  99  |  66<H>  ----------------------------<  383<H>  5.5<H>   |  25  |  4.10<H>    Ca    7.9<L>      14 Jun 2023 06:03  Phos  3.2     06-13          CAPILLARY BLOOD GLUCOSE      POCT Blood Glucose.: 253 mg/dL (14 Jun 2023 12:14)  POCT Blood Glucose.: 394 mg/dL (14 Jun 2023 08:20)  POCT Blood Glucose.: 419 mg/dL (14 Jun 2023 08:09)  POCT Blood Glucose.: 319 mg/dL (13 Jun 2023 21:36)  POCT Blood Glucose.: 360 mg/dL (13 Jun 2023 17:12)            MEDICATIONS  (STANDING):  aspirin  chewable 81 milliGRAM(s) Oral daily  atorvastatin 80 milliGRAM(s) Oral at bedtime  cefTRIAXone   IVPB 2000 milliGRAM(s) IV Intermittent once  dextrose 5%. 1000 milliLiter(s) (50 mL/Hr) IV Continuous <Continuous>  dextrose 50% Injectable 50 milliLiter(s) IV Push every 15 minutes  dextrose 50% Injectable 25 milliLiter(s) IV Push every 15 minutes  heparin   Injectable 7500 Unit(s) SubCutaneous every 8 hours  insulin glargine Injectable (LANTUS) 30 Unit(s) SubCutaneous every morning  insulin lispro (ADMELOG) corrective regimen sliding scale   SubCutaneous three times a day before meals  insulin lispro (ADMELOG) corrective regimen sliding scale   SubCutaneous at bedtime  insulin lispro Injectable (ADMELOG) 5 Unit(s) SubCutaneous three times a day before meals  levothyroxine 75 MICROGram(s) Oral daily  metoprolol succinate ER 25 milliGRAM(s) Oral daily  midodrine 5 milliGRAM(s) Oral every 8 hours  midodrine      nystatin Powder 1 Application(s) Topical two times a day  pantoprazole    Tablet 40 milliGRAM(s) Oral before breakfast    MEDICATIONS  (PRN):  sodium chloride 0.9% lock flush 10 milliLiter(s) IV Push every 1 hour PRN Pre/post blood products, medications, blood draw, and to maintain line patency      REVIEW OF SYSTEMS:  CONSTITUTIONAL: No fever, weight loss, or fatigue  EYES: No eye pain, visual disturbances, or discharge  ENMT:  No difficulty hearing, tinnitus, vertigo; No sinus or throat pain  NECK: No pain or stiffness  RESPIRATORY: No cough, wheezing, chills or hemoptysis; No shortness of breath  CARDIOVASCULAR: No chest pain, palpitations, dizziness, or leg swelling  GASTROINTESTINAL: No abdominal or epigastric pain. No nausea, vomiting, or hematemesis; No diarrhea or constipation. No melena or hematochezia.  GENITOURINARY: No dysuria, frequency, hematuria, or incontinence  NEUROLOGICAL: No headaches, memory loss, loss of strength, numbness, or tremors  SKIN: No itching, burning, rashes, or lesions   LYMPH NODES: No enlarged glands  ENDOCRINE: No heat or cold intolerance; No hair loss  MUSCULOSKELETAL: No joint pain or swelling; No muscle, back, or extremity pain  PSYCHIATRIC: No depression, anxiety, mood swings, or difficulty sleeping  HEME/LYMPH: No easy bruising, or bleeding gums  ALLERY AND IMMUNOLOGIC: No hives or eczema    RADIOLOGY & ADDITIONAL TESTS:    Imaging Personally Reviewed:  [ x] YES  [ ] NO    Consultant(s) Notes Reviewed:  [x ] YES  [ ] NO    PHYSICAL EXAM:  GENERAL: NAD, well-groomed, well-developed  HEAD:  Atraumatic, Normocephalic  EYES: EOMI, PERRLA, conjunctiva and sclera clear  ENMT: No tonsillar erythema, exudates, or enlargement; Moist mucous membranes, Good dentition, No lesions  NECK: Supple, No JVD, Normal thyroid  NERVOUS SYSTEM:  Alert & Oriented X3, Good concentration; Motor Strength 5/5 B/L upper and lower extremities; DTRs 2+ intact and symmetric  CHEST/LUNG: Clear to percussion bilaterally; No rales, rhonchi, wheezing, or rubs  HEART: Regular rate and rhythm; No murmurs, rubs, or gallops  ABDOMEN: Soft, Nontender, Nondistended; Bowel sounds present  EXTREMITIES:  2+ Peripheral Pulses, No clubbing, cyanosis, or edema  LYMPH: No lymphadenopathy noted  SKIN: No rashes or lesions    Care Discussed with Consultants/Other Providers [x ] YES  [ ] NO    advance care planning and advance directives discussed, including but not limited to long term care planning, and all forms/documents reviewed [x]YES

## 2023-06-14 NOTE — CONSULT NOTE ADULT - CONSULT REQUESTED DATE/TIME
04-Jun-2023 06:23
04-Jun-2023 18:24
13-Jun-2023 11:32
04-Jun-2023 12:17
14-Jun-2023 11:16
14-Jun-2023 12:57
13-Jun-2023 12:56
04-Jun-2023 08:44

## 2023-06-14 NOTE — CONSULT NOTE ADULT - SUBJECTIVE AND OBJECTIVE BOX
Patient is a 72y old  Female who presents with a chief complaint of sepsis (2023 08:37)    Type:1 DX 26 years old.  known complications: nephropathy. Endocrine: Tim in [facility], next appt: end of this month 2023. rx home: Lantus 37 units daily, Novolog as needed. dexcom CGM on Left abd- . Wants to continue wearing and replace when DSC. Has all supplies needed at home. diabetes education provided      HPI:  72-year-old female with history of insulin-dependent diabetes, CHF, hypertension who is presenting with nausea and vomiting for the last few days states no significant abdominal pain and history of multiple dark emesis and 1 episode of diarrhea today.  Denies fever or chills.  Denies URI symptoms.  States she was taking her insulin except today.  In ER patient was found to be in septic shock.  patient is being admitted for further work up and treatment.   (2023 10:59)      PAST MEDICAL & SURGICAL HISTORY:  Hypertension      Diabetes      Lymphedema      H/O left bundle branch block      History of left bundle branch block (LBBB)      Systolic heart failure, chronic      H/O cataract        History of surgical removal of meniscus of knee  left in       Frozen shoulder        H/O Achilles tendon repair  lengthened bilaterally,       Fractured skull            Allergies    Ativan (Rash; Urticaria)  penicillins (Hives)    Intolerances        MEDICATIONS  (STANDING):  aspirin  chewable 81 milliGRAM(s) Oral daily  atorvastatin 80 milliGRAM(s) Oral at bedtime  cefTRIAXone   IVPB 2000 milliGRAM(s) IV Intermittent once  dextrose 5%. 1000 milliLiter(s) (50 mL/Hr) IV Continuous <Continuous>  dextrose 50% Injectable 50 milliLiter(s) IV Push every 15 minutes  dextrose 50% Injectable 25 milliLiter(s) IV Push every 15 minutes  heparin   Injectable 7500 Unit(s) SubCutaneous every 8 hours  insulin glargine Injectable (LANTUS) 30 Unit(s) SubCutaneous every morning  insulin lispro (ADMELOG) corrective regimen sliding scale   SubCutaneous three times a day before meals  insulin lispro (ADMELOG) corrective regimen sliding scale   SubCutaneous at bedtime  insulin lispro Injectable (ADMELOG) 5 Unit(s) SubCutaneous three times a day before meals  levothyroxine 75 MICROGram(s) Oral daily  metoprolol succinate ER 25 milliGRAM(s) Oral daily  midodrine 5 milliGRAM(s) Oral every 8 hours  midodrine      nystatin Powder 1 Application(s) Topical two times a day  pantoprazole    Tablet 40 milliGRAM(s) Oral before breakfast

## 2023-06-14 NOTE — CONSULT NOTE ADULT - CONSULT REQUESTED BY NAME
Alexander Edouard
Dr Luis Mcdermott
Dr Jerez
Dr Luis Mcdermott
Dr. Mcdermott
Md Mcdermott
LEONCIO Mcdermott

## 2023-06-14 NOTE — PROGRESS NOTE ADULT - SUBJECTIVE AND OBJECTIVE BOX
EVELYN LOPEZ is a 72yFemale , patient examined and chart reviewed.    INTERVAL HPI/ OVERNIGHT EVENTS:   NAD. Afebrile  Feeling well.    PAST MEDICAL & SURGICAL HISTORY:  Hypertension  Diabetes  Lymphedema  H/O left bundle branch block  History of left bundle branch block (LBBB)  Systolic heart failure, chronic  H/O cataract  2020  History of surgical removal of meniscus of knee  left in 1971  Frozen shoulder  2000  H/O Achilles tendon repair  lengthened bilaterally, 2000  Fractured skull  1968      For details regarding the patient's social history, family history, and other miscellaneous elements, please refer the initial infectious diseases consultation and/or the admitting history and physical examination for this admission.     ROS:  CONSTITUTIONAL:  Negative fever or chills  EYES:  Negative  blurry vision or double vision  CARDIOVASCULAR:  Negative for chest pain or palpitations  RESPIRATORY:  Negative for cough, wheezing, or SOB   GASTROINTESTINAL:  Negative for nausea vomiting, diarrhea, constipation, or abdominal pain   GENITOURINARY:  Negative frequency, urgency or dysuria  NEUROLOGIC:  No headache, confusion, dizziness, lightheadedness  All other systems were reviewed and are negative     ALLERGIES  penicillins (Hives)      Current inpatient medications :    ANTIBIOTICS/RELEVANT:  cefTRIAXone   IVPB 2000 milliGRAM(s) IV Intermittent every 24 hours    MEDICATIONS  (STANDING):  aspirin  chewable 81 milliGRAM(s) Oral daily  atorvastatin 80 milliGRAM(s) Oral at bedtime  dextrose 5%. 1000 milliLiter(s) (50 mL/Hr) IV Continuous <Continuous>  dextrose 50% Injectable 50 milliLiter(s) IV Push every 15 minutes  dextrose 50% Injectable 25 milliLiter(s) IV Push every 15 minutes  epoetin mendoza-epbx (RETACRIT) Injectable 88250 Unit(s) IV Push <User Schedule>  heparin   Injectable 7500 Unit(s) SubCutaneous every 8 hours  insulin glargine Injectable (LANTUS) 30 Unit(s) SubCutaneous every morning  insulin lispro (ADMELOG) corrective regimen sliding scale   SubCutaneous three times a day before meals  insulin lispro (ADMELOG) corrective regimen sliding scale   SubCutaneous at bedtime  insulin lispro Injectable (ADMELOG) 5 Unit(s) SubCutaneous three times a day before meals  levothyroxine 75 MICROGram(s) Oral daily  metoprolol succinate ER 25 milliGRAM(s) Oral daily  midodrine      midodrine 5 milliGRAM(s) Oral every 8 hours  nystatin Powder 1 Application(s) Topical two times a day  pantoprazole    Tablet 40 milliGRAM(s) Oral before breakfast    MEDICATIONS  (PRN):  sodium chloride 0.9% lock flush 10 milliLiter(s) IV Push every 1 hour PRN Pre/post blood products, medications, blood draw, and to maintain line patency      Objective:  Vital Signs Last 24 Hrs  T(C): 36.7 (14 Jun 2023 13:50), Max: 36.8 (13 Jun 2023 20:00)  T(F): 98.1 (14 Jun 2023 13:50), Max: 98.2 (13 Jun 2023 20:00)  HR: 77 (14 Jun 2023 13:55) (76 - 87)  BP: 85/48 (14 Jun 2023 13:55) (85/48 - 116/54)  RR: 18 (14 Jun 2023 13:50) (18 - 18)  SpO2: 98% (14 Jun 2023 13:50) (92% - 98%)    Parameters below as of 14 Jun 2023 13:50  Patient On (Oxygen Delivery Method): room air        Physical Exam:  General: no acute distress  Neck: supple, trachea midline right IJ HD cath c/d/i  Lungs: clear, no wheeze/rhonchi  Cardiovascular: regular rate and rhythm, S1 S2  Abdomen: soft, nontender,  bowel sounds normal  Neurological: alert and oriented x3  Skin: no rash  Extremities: + edema      LABS:                        8.9    16.87 )-----------( 202      ( 14 Jun 2023 06:03 )             27.7   06-14    133<L>  |  99  |  66<H>  ----------------------------<  383<H>  5.5<H>   |  25  |  4.10<H>    Ca    7.9<L>      14 Jun 2023 06:03  Phos  3.2     06-13      MICROBIOLOGY:  Culture - Blood (collected 11 Jun 2023 14:25)  Source: .Blood Blood  Preliminary Report (12 Jun 2023 20:02):    No growth to date.    Culture - Blood (collected 11 Jun 2023 12:40)  Source: .Blood Blood  Preliminary Report (12 Jun 2023 20:02):    No growth to date.      RADIOLOGY & ADDITIONAL STUDIES:    ACC: [id number] EXAM:  CT ABDOMEN AND PELVIS OC   ORDERED BY:  PEDRO LUIS MARTIN     PROCEDURE DATE:  06/04/2023          INTERPRETATION:  CLINICAL INFORMATION: Vomiting and abdominal pain.    COMPARISON: 4/20/2023 CT abdomen pelvis    CONTRAST/COMPLICATIONS:  IV Contrast: NONE  Oral Contrast: Omnipaque 300  Complications: None reported at time of study completion    PROCEDURE:  CT of the Abdomen and Pelvis was performed.  Sagittal and coronal reformats were performed.    FINDINGS:  LOWER CHEST: Within normal limits.    LIVER: Within normal limits.  BILE DUCTS: Normal caliber.  GALLBLADDER: Within normal limits.  SPLEEN: Within normal limits.  PANCREAS: Within normal limits.  ADRENALS: Within normal limits.  KIDNEYS/URETERS: Mild left hydronephrosis but with no stone or other   obstructing lesion identified. Suggestion of left renal pelvis and   ureteral wall thickening not well evaluated without IV contrast. Kidneys   otherwise similar to prior with mild bilateral perinephric stranding. No   concerning renal mass.    BLADDER: Nondilated. No stones or filling defects.  REPRODUCTIVE ORGANS: Small calcification in the uterus. No concerning   findings.    BOWEL: No bowel obstruction. Appendix is normal.  PERITONEUM: No ascites.  VESSELS:No abdominal aortic aneurysm. Atherosclerosis similar to prior.  RETROPERITONEUM/LYMPH NODES: No lymphadenopathy.  ABDOMINAL WALL: Within normal limits.  BONES: No acute findings. Bilateral L5 spondylolysis with mild   degenerative anterolisthesis ofL5 on S1. Prominent degenerative changes   lower lumbar spine.    IMPRESSION:  Mild left hydronephrosis is new. Left ureter not dilated. Suggestion of   left renal pelvis wall thickening not well evaluated without IV contrast.   Urinary tract infection could appear this way though not definitive.   Early or mild proximal left ureteral obstruction also possible though no   stone or other obstructing lesion is evident.    No other significant change. No bowel obstruction.    Assessment :   71yo F with PMHx IDDM2, HFrEF, HTN, hypothyroidism, LBBB, chronic lymphedema admitted with septic shock and Ecoli bacteremia sec Left pyelo with hydro- no obtructive uropathy on CT    Off pressors  Worsening DUNCAN started on HD 6/6  Rising WBC Afebrile Unclear etiology -Repeat blood cultures 6/11 ngtd  WBC downtrending  Clinically stable    Plan :   On Rocephin till 6/14  Repeat blood cultures- NGTD  Ok from ID standpoint for permanent HD cath   HD per renal  Trend temps and cbc  Pulm toileting  Increase activity/OOB to chair  Stable from ID standpoint    D/w Pt      Continue with present regiment.  Appropriate use of antibiotics and adverse effects reviewed.      > 35 minutes were spent in direct patient care reviewing notes, medications ,labs data/ imaging , discussion with multidisciplinary team.    Thank you for allowing me to participate in care of your patient .    Robby Clark MD  Infectious Disease  [phone]

## 2023-06-14 NOTE — CONSULT NOTE ADULT - PROBLEM SELECTOR RECOMMENDATION 9
Type 1 A1c 8.2 % adm sepsis  Recommend endocrine-Perlman on consult  FU appt: TBA  DSC recommendations: return to home regimen and glucose monitoring  diabetes education provided  Diabetes support info and cell # [phone] given   Goal 100-180 mg/dL; 140-180 mg/dL in critical care areas Type 1 A1c 8.2 % adm sepsis  Recommend endocrine-Perlman on consult  Lantus 30 units daily, Lispro 5 units and corrective moderate scale  FU appt: elizabeth June 2023  DSC recommendations: return to home regimen and glucose monitoring  diabetes education provided  Diabetes support info and cell # [phone] given   Goal 100-180 mg/dL; 140-180 mg/dL in critical care areas

## 2023-06-14 NOTE — PROGRESS NOTE ADULT - SUBJECTIVE AND OBJECTIVE BOX
Patient is a 72y old  Female who presents with a chief complaint of sepsis (04 Jun 2023 10:59)       HPI: female with history of insulin-dependent diabetes, CHF, hypertension who presented to the ED with nausea and vomiting for several days.  She has experienced no significant abdominal pain and history of multiple dark emesis and 1 episode of diarrhea today.  She denies fever or chills.  Denies URI symptoms.  She denies significant change in her urinary patters. CT performed on admission demonstrated mild left hydronephrosis without ureteral dilatation or obstructing calculus. Found to have DUNCAN and hypotension. Renal consulted for further eval. Has not seen a Nephrologist outpatient. Currently asymptomatic.      No acute events noted overnight    PAST MEDICAL & SURGICAL HISTORY:  Hypertension      Diabetes      Lymphedema      H/O left bundle branch block      History of left bundle branch block (LBBB)      Systolic heart failure, chronic      H/O cataract  2020      History of surgical removal of meniscus of knee  left in 1971      Frozen shoulder  2000      H/O Achilles tendon repair  lengthened bilaterally, 2000      Fractured skull  1968         MEDICATIONS  (STANDING):  aspirin  chewable 81 milliGRAM(s) Oral daily  atorvastatin 80 milliGRAM(s) Oral at bedtime  cefTRIAXone   IVPB 2000 milliGRAM(s) IV Intermittent once  dextrose 5%. 1000 milliLiter(s) (50 mL/Hr) IV Continuous <Continuous>  dextrose 50% Injectable 50 milliLiter(s) IV Push every 15 minutes  dextrose 50% Injectable 25 milliLiter(s) IV Push every 15 minutes  heparin   Injectable 7500 Unit(s) SubCutaneous every 8 hours  insulin glargine Injectable (LANTUS) 25 Unit(s) SubCutaneous every morning  insulin lispro (ADMELOG) corrective regimen sliding scale   SubCutaneous at bedtime  insulin lispro (ADMELOG) corrective regimen sliding scale   SubCutaneous three times a day before meals  levothyroxine 75 MICROGram(s) Oral daily  metoprolol succinate ER 25 milliGRAM(s) Oral daily  midodrine      midodrine 5 milliGRAM(s) Oral every 8 hours  nystatin Powder 1 Application(s) Topical two times a day  pantoprazole    Tablet 40 milliGRAM(s) Oral before breakfast    MEDICATIONS  (PRN):  sodium chloride 0.9% lock flush 10 milliLiter(s) IV Push every 1 hour PRN Pre/post blood products, medications, blood draw, and to maintain line patency      FAMILY HISTORY:  Family history of CVA  mom, age 57    [place]    Social History:Non smoker        Allergies    penicillins (Hives)    Intolerances         REVIEW OF SYSTEMS:    as above      Vital Signs Last 24 Hrs  T(C): 36.8 (13 Jun 2023 20:00), Max: 36.8 (13 Jun 2023 20:00)  T(F): 98.2 (13 Jun 2023 20:00), Max: 98.2 (13 Jun 2023 20:00)  HR: 82 (13 Jun 2023 20:00) (82 - 87)  BP: 116/54 (13 Jun 2023 20:00) (82/50 - 116/54)  BP(mean): --  RR: 18 (13 Jun 2023 20:00) (17 - 18)  SpO2: 97% (13 Jun 2023 20:00) (95% - 97%)    Parameters below as of 13 Jun 2023 20:00  Patient On (Oxygen Delivery Method): room air          PHYSICAL EXAM:    GENERAL: NAD  HEAD:  Atraumatic, Normocephalic  EYES: EOMI, conjunctiva and sclera clear  ENMT: No Drainage from nares, No drainage from ears  NECK: Supple, +dialysis catheter intact  NERVOUS SYSTEM:  Awake and Alert  CHEST/LUNG: Clear to percussion bilaterally; No rales, rhonchi, wheezing, or rubs  HEART: Regular rate and rhythm; No murmurs, rubs, or gallops  ABDOMEN: Soft, Nontender, Nondistended; Bowel sounds present, obese  EXTREMITIES:  + Edema  SKIN: No rashes No obvious ecchymosis      LABS:     6/14/23: pending                                9.6    20.74 )-----------( 156      ( 13 Jun 2023 05:20 )             28.7     06-13    134<L>  |  102  |  57<H>  ----------------------------<  221<H>  4.8   |  27  |  3.20<H>    Ca    7.8<L>      13 Jun 2023 05:20  Phos  3.2     06-13  Mg     2.4     06-12      PT/INR - ( 12 Jun 2023 06:25 )   PT: 14.5 sec;   INR: 1.24 ratio

## 2023-06-14 NOTE — PROGRESS NOTE ADULT - SUBJECTIVE AND OBJECTIVE BOX
[facility] -- Howard Kimble Pannella, Patel, Savella, Goodger: Office # [phone]    Follow Up: Hypotension      Subjective/Observations: Patient seen and examined. Patient awake, alert, resting in bed. No complaints of chest pain, dyspnea, palpitations or dizziness. No signs of orthopnea or PND. Continuing with LE edema. Tolerating room air.     REVIEW OF SYSTEMS: All other review of systems are negative unless indicated above.     PAST MEDICAL & SURGICAL HISTORY:  Hypertension      Hypertension      Diabetes      Lymphedema      H/O left bundle branch block      History of left bundle branch block (LBBB)      Systolic heart failure, chronic      H/O cataract  2020      History of surgical removal of meniscus of knee  left in 1971      Frozen shoulder  2000      H/O Achilles tendon repair  lengthened bilaterally, 2000      Fractured skull  1968    MEDICATIONS  (STANDING):  aspirin  chewable 81 milliGRAM(s) Oral daily  atorvastatin 80 milliGRAM(s) Oral at bedtime  cefTRIAXone   IVPB 2000 milliGRAM(s) IV Intermittent once  dextrose 5%. 1000 milliLiter(s) (50 mL/Hr) IV Continuous <Continuous>  dextrose 50% Injectable 50 milliLiter(s) IV Push every 15 minutes  dextrose 50% Injectable 25 milliLiter(s) IV Push every 15 minutes  heparin   Injectable 7500 Unit(s) SubCutaneous every 8 hours  insulin glargine Injectable (LANTUS) 30 Unit(s) SubCutaneous every morning  insulin lispro (ADMELOG) corrective regimen sliding scale   SubCutaneous three times a day before meals  insulin lispro (ADMELOG) corrective regimen sliding scale   SubCutaneous at bedtime  insulin lispro Injectable (ADMELOG) 5 Unit(s) SubCutaneous three times a day before meals  levothyroxine 75 MICROGram(s) Oral daily  metoprolol succinate ER 25 milliGRAM(s) Oral daily  midodrine      midodrine 5 milliGRAM(s) Oral every 8 hours  nystatin Powder 1 Application(s) Topical two times a day  pantoprazole    Tablet 40 milliGRAM(s) Oral before breakfast    MEDICATIONS  (PRN):  sodium chloride 0.9% lock flush 10 milliLiter(s) IV Push every 1 hour PRN Pre/post blood products, medications, blood draw, and to maintain line patency    Allergies  Ativan (Rash; Urticaria)  penicillins (Hives)    Vital Signs Last 24 Hrs  T(C): 36.6 (14 Jun 2023 05:15), Max: 36.8 (13 Jun 2023 20:00)  T(F): 97.9 (14 Jun 2023 05:15), Max: 98.2 (13 Jun 2023 20:00)  HR: 87 (14 Jun 2023 05:15) (82 - 87)  BP: 95/57 (14 Jun 2023 05:15) (82/50 - 116/54)  BP(mean): --  RR: 18 (14 Jun 2023 05:15) (17 - 18)  SpO2: 92% (14 Jun 2023 05:15) (92% - 97%)    Parameters below as of 14 Jun 2023 05:15  Patient On (Oxygen Delivery Method): room air      I&O's Summary    13 Jun 2023 07:01  -  14 Jun 2023 07:00  --------------------------------------------------------  IN: 770 mL / OUT: 200 mL / NET: 570 mL          TELE: Not on telemetry   PHYSICAL EXAM:  Constitutional: NAD, awake and alert, Obese   HEENT: Moist Mucous Membranes, Anicteric  Pulmonary: Non-labored, breath sounds are clear bilaterally, Diminished at bases No wheezing, rales or rhonchi  Cardiovascular: Regular, S1 and S2, + murmurs, No rubs, gallops or clicks + Rt IJ HD cath   Gastrointestinal:  soft, nontender, nondistended   Lymph: +2-3 LE peripheral edema. No lymphadenopathy.   Skin: No visible rashes or ulcers.  Psych:  Mood & affect appropriate      LABS: All Labs Reviewed:                        8.9    16.87 )-----------( 202      ( 14 Jun 2023 06:03 )             27.7                         9.6    20.74 )-----------( 156      ( 13 Jun 2023 05:20 )             28.7                         9.9    19.69 )-----------( 207      ( 12 Jun 2023 06:25 )             29.7     14 Jun 2023 06:03    133    |  99     |  66     ----------------------------<  383    5.5     |  25     |  4.10   13 Jun 2023 05:20    134    |  102    |  57     ----------------------------<  221    4.8     |  27     |  3.20   12 Jun 2023 06:25    134    |  100    |  84     ----------------------------<  241    3.8     |  26     |  4.10     Ca    7.9        14 Jun 2023 06:03  Ca    7.8        13 Jun 2023 05:20  Ca    7.7        12 Jun 2023 06:25  Phos  3.2       13 Jun 2023 05:20  Phos  3.4       12 Jun 2023 06:25  Mg     2.4       12 Jun 2023 06:25    12 Lead ECG:   Ventricular Rate 83 BPM    Atrial Rate 83 BPM    P-R Interval 264 ms    QRS Duration 170 ms    Q-T Interval 446 ms    QTC Calculation(Bazett) 524 ms    P Axis 111 degrees    R Axis -61 degrees    T Axis 118 degrees    Diagnosis Line Sinus rhythm with 1st degree AV block  Left axis deviation  Left bundle branch block  Abnormal ECG  When compared with ECG of 05-JUN-2023 09:06,  No significant change was found  Confirmed by Skylar Hernández ([id number]) on 6/14/2023 7:21:13 AM (06-13-23 @ 13:34)      TRANSTHORACIC ECHOCARDIOGRAM REPORT  ________________________________________________________________________________                                      _______  Pt. Name:       EVELYN LOPEZ Study Date:    6/6/2023  MRN:            [id number]         YOB: 1950  Accession #:    [id number]        Age:           72 years  Account#:       [id number]       Gender:        F  Heart Rate:                      Height:        70.87 in (180.00 cm)  Rhythm:                          Weight:        ( )  Blood Pressure: 119/57 mmHg      BSA/BMI:       /  ________________________________________________________________________________________  Referring Physician:    [id number] Luis Mcdermott  Interpreting Physician: Skylar Hernández MD  Primary Sonographer:    AS    CPT:               ECHO TTE WO CON COMP W DOPP - 63352.m  Indication(s):     Cardiac murmur, unspecified - R01.1  Procedure:         Transthoracic echocardiogram with 2-D, M-mode and complete                     spectral and color flow Doppler.  Ordering Location: ICU1    _______________________________________________________________________________________  CONCLUSIONS:      1. The left ventricular systolic function is mildly decreased with an ejection fraction of 38 % by Nunes's method of disks.   2. Normal right ventricular cavity size and probably normal systolic function.   3. The left atrium is mildly dilated.   4. The right atrium is normal.   5. Moderate calcification of the aortic valve leaflets.  6. Moderate aortic stenosis.   7. There is moderate calcification of the mitral valve annulus.   8. Trace mitral regurgitation.   9. Trace tricuspid regurgitation.  10. Pulmonic valve was not well visualized.  11. The inferior vena cava is normal measuring 1.86 cm in diameter, (normal <2.1cm) with normal inspiratory collapse (normal >50%) consistent with normal right atrial pressure (~3, range 0-5mmHg).    ________________________________________________________________________________________  FINDINGS:     Left Ventricle:  The left ventricular systolic function is mildly decreased with a calculated ejection fraction of 38 % by the Nunes's biplane method of disks.     Right Ventricle:  Normal right ventricular cavity size and probably normal systolic function. Tricuspid annular plane systolic excursion (TAPSE) is 2.7 cm (normal >=1.7 cm).     Left Atrium:  The left atrium is mildly dilated.     Right Atrium:  The right atrium is normal.     Aortic Valve:  There is moderate calcification of the aortic valve leaflets. There is moderate aortic stenosis. The peak transaortic velocity is 3.80 m/s, peak transaortic gradient is 57.8 mmHg and mean transaortic gradient is 31.0 mmHg with an LVOT/aortic valve VTI ratio of 0.23. The aortic valve area is estimated at 0.64 cm² by the continuity equation.     Mitral Valve:  There is mitral valve thickening of the anterior and posterior leaflets. There is moderate calcification of the mitral valve annulus. There is trace mitral regurgitation.     Tricuspid Valve:  There is trace tricuspid regurgitation.     Pulmonic Valve:  The pulmonic valve was not well visualized.     Systemic Veins:  The inferior vena cava is normal measuring 1.86 cm in diameter, (normal <2.1cm) with normal inspiratorycollapse (normal >50%) consistent with normal right atrial pressure (~3, range 0-5mmHg).  ____________________________________________________________________  QUANTITATIVE DATA  Left Ventricle Measurements                         Indexed BSA  IVSd (2D):   1.1 cm  LVPWd (2D):  1.3 cm  LVIDd (2D):  5.2 cm  LVIDs (2D):  4.3 cm  LV Mass:     247 g  LV Vol d, MOD A2C: 111.0 ml  LV Vol d, MOD A4C: 112.0 ml  LV Vol d, MOD BP:  116.7 ml  LV Vol s, MOD A2C: 70.9 ml  LV Vol s, MOD A4C: 67.8 ml  LV Vol s, MOD BP:  72.9 ml  LVOT SV MOD BP:    43.8 ml  LV EF% MOD BP:     38 %     MV E Vmax:    0.89 m/s  MV A Vmax:    1.22 m/s  MV E/A:       0.73  e' lateral:   12.60 cm/s  e' medial:    11.60 cm/s  E/e' lateral: 7.10  E/e' medial:  7.71  E/e' Average: 7.39  MV DT:        207 msec       Left Atrium Measurements     LA Diam 2D: 4.20 cm    Right Ventricle Measurements     TAPSE:            2.7 cm  TV Preeti. S':       15.50 cm/s  RV Base (RVID1):  3.5 cm  RV Mid (RVID2):   3.1 cm  RV Major (RVID3): 8.3 cm       LVOT / RVOT/ Qp/Qs Data:  LVOT Diameter: 1.90 cm  LVOT Vmax:     0.83 m/s  LVOT VTI:      17.50 cm  LVOT SV:       49.6 ml    Aortic Valve Measurements     AV Vmax:          380.0 cm/s  AV Peak Gradient: 57.8 mmHg  AV Mean Gradient: 31.0 mmHg  AVVTI:           77.6 cm  AV VTI Ratio:     0.23  AoV EOA, Contin:  0.64 cm²    Mitral Valve Measurements     MV E Vmax: 0.9 m/s  MV A Vmax: 1.2 m/s  MV E/A:    0.7       Tricuspid Valve Measurements     RA Pressure: 3 mmHg    ________________________________________________________________________________________  Diagnosing Physician: Skylar Hernández MD.  Electronically signed on 6/7/2023 at 6:14:52 PM by Skylar Hernández MD    *** Final ***

## 2023-06-14 NOTE — PROVIDER CONTACT NOTE (HYPOGLYCEMIA EVENT) - NS PROVIDER CONTACT RECOMMEND-HYPO
OJ 8OZ (236mL) given @ 2110. Rechecked FS 30mins and was 115. Pt asymptomatic, awake resting in bed in locked low position, watching TV, spontaneous chest rise and fall. No new orders at this time.

## 2023-06-14 NOTE — CONSULT NOTE ADULT - SUBJECTIVE AND OBJECTIVE BOX
Patient is a 72y old  Female who presents with a chief complaint of sepsis (14 Jun 2023 12:35)      Reason For Consult: dm1 uncontrolled    HPI:  72-year-old female with history of insulin-dependent diabetes, CHF, hypertension who is presenting with nausea and vomiting for the last few days states no significant abdominal pain and history of multiple dark emesis and 1 episode of diarrhea today.  Denies fever or chills.  Denies URI symptoms.  States she was taking her insulin except today.  In ER patient was found to be in septic shock.  patient is being admitted for further work up and treatment.   (04 Jun 2023 10:59)      PAST MEDICAL & SURGICAL HISTORY:  Hypertension      Diabetes      Lymphedema      H/O left bundle branch block      History of left bundle branch block (LBBB)      Systolic heart failure, chronic      H/O cataract  2020      History of surgical removal of meniscus of knee  left in 1971      Frozen shoulder  2000      H/O Achilles tendon repair  lengthened bilaterally, 2000      Fractured skull  1968          FAMILY HISTORY:  Family history of CVA  mom, age 57          Social History:    MEDICATIONS  (STANDING):  aspirin  chewable 81 milliGRAM(s) Oral daily  atorvastatin 80 milliGRAM(s) Oral at bedtime  dextrose 5%. 1000 milliLiter(s) (50 mL/Hr) IV Continuous <Continuous>  dextrose 50% Injectable 50 milliLiter(s) IV Push every 15 minutes  dextrose 50% Injectable 25 milliLiter(s) IV Push every 15 minutes  heparin   Injectable 7500 Unit(s) SubCutaneous every 8 hours  insulin glargine Injectable (LANTUS) 30 Unit(s) SubCutaneous every morning  insulin lispro (ADMELOG) corrective regimen sliding scale   SubCutaneous at bedtime  insulin lispro (ADMELOG) corrective regimen sliding scale   SubCutaneous three times a day before meals  insulin lispro Injectable (ADMELOG) 5 Unit(s) SubCutaneous three times a day before meals  levothyroxine 75 MICROGram(s) Oral daily  metoprolol succinate ER 25 milliGRAM(s) Oral daily  midodrine      midodrine 5 milliGRAM(s) Oral every 8 hours  nystatin Powder 1 Application(s) Topical two times a day  pantoprazole    Tablet 40 milliGRAM(s) Oral before breakfast    MEDICATIONS  (PRN):  sodium chloride 0.9% lock flush 10 milliLiter(s) IV Push every 1 hour PRN Pre/post blood products, medications, blood draw, and to maintain line patency        T(C): 36.6 (06-14-23 @ 05:15), Max: 36.8 (06-13-23 @ 20:00)  HR: 87 (06-14-23 @ 05:15) (82 - 87)  BP: 95/57 (06-14-23 @ 05:15) (95/57 - 116/54)  RR: 18 (06-14-23 @ 05:15) (18 - 18)  SpO2: 92% (06-14-23 @ 05:15) (92% - 97%)  Wt(kg): --    PHYSICAL EXAM:  GENERAL: NAD, well-groomed, well-developed  HEAD:  Atraumatic, Normocephalic  NECK: Supple, No JVD, Normal thyroid  CHEST/LUNG: Clear to percussion bilaterally; No rales, rhonchi, wheezing, or rubs  HEART: Regular rate and rhythm; No murmurs, rubs, or gallops  ABDOMEN: Soft, Nontender, Nondistended; Bowel sounds present  EXTREMITIES:  2+ Peripheral Pulses, No clubbing, cyanosis, or edema  SKIN: No rashes or lesions    CAPILLARY BLOOD GLUCOSE      POCT Blood Glucose.: 253 mg/dL (14 Jun 2023 12:14)  POCT Blood Glucose.: 394 mg/dL (14 Jun 2023 08:20)  POCT Blood Glucose.: 419 mg/dL (14 Jun 2023 08:09)  POCT Blood Glucose.: 319 mg/dL (13 Jun 2023 21:36)  POCT Blood Glucose.: 360 mg/dL (13 Jun 2023 17:12)                            8.9    16.87 )-----------( 202      ( 14 Jun 2023 06:03 )             27.7       CMP:  06-14 @ 06:03  SGPT --  Albumin --   Alk Phos --   Anion Gap 9   SGOT --   Total Bili --   BUN 66   Calcium Total 7.9   CO2 25   Chloride 99   Creatinine 4.10   eGFR if AA --   eGFR if non AA --   Glucose 383   Potassium 5.5   Protein --   Sodium 133      Thyroid Function Tests:      Diabetes Tests:       Radiology:

## 2023-06-15 LAB
ANION GAP SERPL CALC-SCNC: 5 MMOL/L — SIGNIFICANT CHANGE UP (ref 5–17)
BASOPHILS # BLD AUTO: 0.06 K/UL — SIGNIFICANT CHANGE UP (ref 0–0.2)
BASOPHILS NFR BLD AUTO: 0.4 % — SIGNIFICANT CHANGE UP (ref 0–2)
BUN SERPL-MCNC: 42 MG/DL — HIGH (ref 7–23)
CALCIUM SERPL-MCNC: 7.6 MG/DL — LOW (ref 8.5–10.1)
CHLORIDE SERPL-SCNC: 101 MMOL/L — SIGNIFICANT CHANGE UP (ref 96–108)
CO2 SERPL-SCNC: 29 MMOL/L — SIGNIFICANT CHANGE UP (ref 22–31)
CREAT SERPL-MCNC: 3.2 MG/DL — HIGH (ref 0.5–1.3)
EGFR: 15 ML/MIN/1.73M2 — LOW
EOSINOPHIL # BLD AUTO: 1.46 K/UL — HIGH (ref 0–0.5)
EOSINOPHIL NFR BLD AUTO: 9.7 % — HIGH (ref 0–6)
GLUCOSE SERPL-MCNC: 181 MG/DL — HIGH (ref 70–99)
HCT VFR BLD CALC: 27.6 % — LOW (ref 34.5–45)
HGB BLD-MCNC: 9.1 G/DL — LOW (ref 11.5–15.5)
IMM GRANULOCYTES NFR BLD AUTO: 1.3 % — HIGH (ref 0–0.9)
LYMPHOCYTES # BLD AUTO: 1.75 K/UL — SIGNIFICANT CHANGE UP (ref 1–3.3)
LYMPHOCYTES # BLD AUTO: 11.6 % — LOW (ref 13–44)
MCHC RBC-ENTMCNC: 29.7 PG — SIGNIFICANT CHANGE UP (ref 27–34)
MCHC RBC-ENTMCNC: 33 GM/DL — SIGNIFICANT CHANGE UP (ref 32–36)
MCV RBC AUTO: 90.2 FL — SIGNIFICANT CHANGE UP (ref 80–100)
MONOCYTES # BLD AUTO: 1.21 K/UL — HIGH (ref 0–0.9)
MONOCYTES NFR BLD AUTO: 8 % — SIGNIFICANT CHANGE UP (ref 2–14)
NEUTROPHILS # BLD AUTO: 10.45 K/UL — HIGH (ref 1.8–7.4)
NEUTROPHILS NFR BLD AUTO: 69 % — SIGNIFICANT CHANGE UP (ref 43–77)
NRBC # BLD: 0 /100 WBCS — SIGNIFICANT CHANGE UP (ref 0–0)
PLATELET # BLD AUTO: 215 K/UL — SIGNIFICANT CHANGE UP (ref 150–400)
POTASSIUM SERPL-MCNC: 4.1 MMOL/L — SIGNIFICANT CHANGE UP (ref 3.5–5.3)
POTASSIUM SERPL-SCNC: 4.1 MMOL/L — SIGNIFICANT CHANGE UP (ref 3.5–5.3)
RBC # BLD: 3.06 M/UL — LOW (ref 3.8–5.2)
RBC # FLD: 14.4 % — SIGNIFICANT CHANGE UP (ref 10.3–14.5)
SODIUM SERPL-SCNC: 135 MMOL/L — SIGNIFICANT CHANGE UP (ref 135–145)
WBC # BLD: 15.12 K/UL — HIGH (ref 3.8–10.5)
WBC # FLD AUTO: 15.12 K/UL — HIGH (ref 3.8–10.5)

## 2023-06-15 PROCEDURE — 99232 SBSQ HOSP IP/OBS MODERATE 35: CPT

## 2023-06-15 RX ADMIN — Medication 2: at 12:26

## 2023-06-15 RX ADMIN — HEPARIN SODIUM 7500 UNIT(S): 5000 INJECTION INTRAVENOUS; SUBCUTANEOUS at 06:24

## 2023-06-15 RX ADMIN — ATORVASTATIN CALCIUM 80 MILLIGRAM(S): 80 TABLET, FILM COATED ORAL at 22:25

## 2023-06-15 RX ADMIN — PANTOPRAZOLE SODIUM 40 MILLIGRAM(S): 20 TABLET, DELAYED RELEASE ORAL at 06:23

## 2023-06-15 RX ADMIN — Medication 5 UNIT(S): at 08:07

## 2023-06-15 RX ADMIN — Medication 5 UNIT(S): at 12:25

## 2023-06-15 RX ADMIN — INSULIN GLARGINE 30 UNIT(S): 100 INJECTION, SOLUTION SUBCUTANEOUS at 08:08

## 2023-06-15 RX ADMIN — HEPARIN SODIUM 7500 UNIT(S): 5000 INJECTION INTRAVENOUS; SUBCUTANEOUS at 22:31

## 2023-06-15 RX ADMIN — MIDODRINE HYDROCHLORIDE 5 MILLIGRAM(S): 2.5 TABLET ORAL at 14:43

## 2023-06-15 RX ADMIN — NYSTATIN CREAM 1 APPLICATION(S): 100000 CREAM TOPICAL at 06:23

## 2023-06-15 RX ADMIN — Medication 75 MICROGRAM(S): at 06:23

## 2023-06-15 RX ADMIN — NYSTATIN CREAM 1 APPLICATION(S): 100000 CREAM TOPICAL at 17:40

## 2023-06-15 RX ADMIN — Medication 5 UNIT(S): at 17:39

## 2023-06-15 RX ADMIN — MIDODRINE HYDROCHLORIDE 5 MILLIGRAM(S): 2.5 TABLET ORAL at 06:23

## 2023-06-15 RX ADMIN — MIDODRINE HYDROCHLORIDE 5 MILLIGRAM(S): 2.5 TABLET ORAL at 22:25

## 2023-06-15 RX ADMIN — Medication 2: at 08:08

## 2023-06-15 RX ADMIN — HEPARIN SODIUM 7500 UNIT(S): 5000 INJECTION INTRAVENOUS; SUBCUTANEOUS at 14:43

## 2023-06-15 NOTE — PROGRESS NOTE ADULT - SUBJECTIVE AND OBJECTIVE BOX
Date of Service 06-15-23 @ 14:56    Patient is a 72y old  Female who presents with a chief complaint of sepsis (15 Frederic 2023 13:03)      INTERVAL /OVERNIGHT EVENTS: feels frustated    MEDICATIONS  (STANDING):  aspirin  chewable 81 milliGRAM(s) Oral daily  atorvastatin 80 milliGRAM(s) Oral at bedtime  dextrose 5%. 1000 milliLiter(s) (50 mL/Hr) IV Continuous <Continuous>  dextrose 50% Injectable 50 milliLiter(s) IV Push every 15 minutes  dextrose 50% Injectable 25 milliLiter(s) IV Push every 15 minutes  epoetin mendoza-epbx (RETACRIT) Injectable 52747 Unit(s) IV Push <User Schedule>  heparin   Injectable 7500 Unit(s) SubCutaneous every 8 hours  insulin glargine Injectable (LANTUS) 30 Unit(s) SubCutaneous every morning  insulin lispro (ADMELOG) corrective regimen sliding scale   SubCutaneous three times a day before meals  insulin lispro (ADMELOG) corrective regimen sliding scale   SubCutaneous at bedtime  insulin lispro Injectable (ADMELOG) 5 Unit(s) SubCutaneous three times a day before meals  levothyroxine 75 MICROGram(s) Oral daily  metoprolol succinate ER 25 milliGRAM(s) Oral daily  midodrine      midodrine 5 milliGRAM(s) Oral every 8 hours  nystatin Powder 1 Application(s) Topical two times a day  pantoprazole    Tablet 40 milliGRAM(s) Oral before breakfast    MEDICATIONS  (PRN):  sodium chloride 0.9% lock flush 10 milliLiter(s) IV Push every 1 hour PRN Pre/post blood products, medications, blood draw, and to maintain line patency      Allergies    Ativan (Rash; Urticaria)  penicillins (Hives)    Intolerances        REVIEW OF SYSTEMS:  CONSTITUTIONAL: No fever, weight loss, or fatigue  EYES: No eye pain, visual disturbances, or discharge  ENMT:  No difficulty hearing, tinnitus, vertigo; No sinus or throat pain  NECK: No pain or stiffness  RESPIRATORY: No cough, wheezing, chills or hemoptysis; No shortness of breath  CARDIOVASCULAR: No chest pain, palpitations, dizziness, or leg swelling  GASTROINTESTINAL: No abdominal or epigastric pain. No nausea, vomiting, or hematemesis; No diarrhea or constipation. No melena or hematochezia.  GENITOURINARY: No dysuria, frequency, hematuria, or incontinence  NEUROLOGICAL: No headaches, memory loss, loss of strength, numbness, or tremors  SKIN: No itching, burning, rashes, or lesions   LYMPH NODES: No enlarged glands  ENDOCRINE: No heat or cold intolerance; No hair loss; No polydipsia or polyuria  MUSCULOSKELETAL: No joint pain or swelling; No muscle, back, or extremity pain  PSYCHIATRIC: No depression, anxiety, mood swings, or difficulty sleeping  HEME/LYMPH: No easy bruising, or bleeding gums  ALLERGY AND IMMUNOLOGIC: No hives or eczema    Vital Signs Last 24 Hrs  T(C): 36.5 (15 Frederic 2023 13:53), Max: 36.8 (14 Jun 2023 17:00)  T(F): 97.7 (15 Frederic 2023 13:53), Max: 98.2 (14 Jun 2023 17:00)  HR: 75 (15 Frederic 2023 14:45) (71 - 89)  BP: 107/66 (15 Frederic 2023 14:45) (89/65 - 109/64)  BP(mean): --  RR: 17 (15 Frederic 2023 14:45) (17 - 20)  SpO2: 98% (15 Frederic 2023 14:45) (96% - 99%)    Parameters below as of 15 Frederic 2023 14:45  Patient On (Oxygen Delivery Method): room air        PHYSICAL EXAM:  GENERAL: NAD, well-groomed, well-developed  HEAD:  Atraumatic, Normocephalic  EYES: EOMI, PERRLA, conjunctiva and sclera clear  ENMT: No tonsillar erythema, exudates, or enlargement; Moist mucous membranes, Good dentition, No lesions  NECK: Supple, No JVD, Normal thyroid  NERVOUS SYSTEM:  Alert & Oriented X3, Good concentration; Motor Strength 5/5 B/L upper and lower extremities; DTRs 2+ intact and symmetric  CHEST/LUNG: Clear to auscultation bilaterally; No rales, rhonchi, wheezing, or rubs  HEART: Regular rate and rhythm; No murmurs, rubs, or gallops  ABDOMEN: Soft, Nontender, Nondistended; Bowel sounds present  EXTREMITIES:  2+ Peripheral Pulses, No clubbing, cyanosis, or edema  LYMPH: No lymphadenopathy noted  SKIN: No rashes or lesions    LABS:                        9.1    15.12 )-----------( 215      ( 15 Frederic 2023 05:25 )             27.6     15 Frederic 2023 05:25    135    |  101    |  42     ----------------------------<  181    4.1     |  29     |  3.20     Ca    7.6        15 Frederic 2023 05:25          CAPILLARY BLOOD GLUCOSE      POCT Blood Glucose.: 162 mg/dL (15 Frederic 2023 12:00)  POCT Blood Glucose.: 162 mg/dL (15 Frederic 2023 07:53)  POCT Blood Glucose.: 115 mg/dL (14 Jun 2023 21:38)  POCT Blood Glucose.: 71 mg/dL (14 Jun 2023 20:58)  POCT Blood Glucose.: 96 mg/dL (14 Jun 2023 16:46)      RADIOLOGY & ADDITIONAL TESTS:    Notes Reviewed:  [x ] YES  [ ] NO    Care Discussed with Consultants/Other Providers [ x] YES  [ ] NO

## 2023-06-15 NOTE — SOCIAL WORK PROGRESS NOTE - NSSWPROGRESSNOTE_GEN_ALL_CORE
Per medical team, pt for out pt dialysis. sw to send information to local facilities to eval for chair slot/time. Following facilities sent to: [facility], [place]. Fabiola: caio mortensen.

## 2023-06-15 NOTE — PROGRESS NOTE ADULT - SUBJECTIVE AND OBJECTIVE BOX
Patient is a 72y old  Female who presents with a chief complaint of sepsis (04 Jun 2023 10:59)       HPI: female with history of insulin-dependent diabetes, CHF, hypertension who presented to the ED with nausea and vomiting for several days.  She has experienced no significant abdominal pain and history of multiple dark emesis and 1 episode of diarrhea today.  She denies fever or chills.  Denies URI symptoms.  She denies significant change in her urinary patters. CT performed on admission demonstrated mild left hydronephrosis without ureteral dilatation or obstructing calculus. Found to have DUNCAN and hypotension. Renal consulted for further eval. Has not seen a Nephrologist outpatient. Currently asymptomatic.      No acute events noted overnight    PAST MEDICAL & SURGICAL HISTORY:  Hypertension      Diabetes      Lymphedema      H/O left bundle branch block      History of left bundle branch block (LBBB)      Systolic heart failure, chronic      H/O cataract  2020      History of surgical removal of meniscus of knee  left in 1971      Frozen shoulder  2000      H/O Achilles tendon repair  lengthened bilaterally, 2000      Fractured skull  1968         MEDICATIONS  (STANDING):  aspirin  chewable 81 milliGRAM(s) Oral daily  atorvastatin 80 milliGRAM(s) Oral at bedtime  cefTRIAXone   IVPB 2000 milliGRAM(s) IV Intermittent once  dextrose 5%. 1000 milliLiter(s) (50 mL/Hr) IV Continuous <Continuous>  dextrose 50% Injectable 50 milliLiter(s) IV Push every 15 minutes  dextrose 50% Injectable 25 milliLiter(s) IV Push every 15 minutes  heparin   Injectable 7500 Unit(s) SubCutaneous every 8 hours  insulin glargine Injectable (LANTUS) 25 Unit(s) SubCutaneous every morning  insulin lispro (ADMELOG) corrective regimen sliding scale   SubCutaneous at bedtime  insulin lispro (ADMELOG) corrective regimen sliding scale   SubCutaneous three times a day before meals  levothyroxine 75 MICROGram(s) Oral daily  metoprolol succinate ER 25 milliGRAM(s) Oral daily  midodrine      midodrine 5 milliGRAM(s) Oral every 8 hours  nystatin Powder 1 Application(s) Topical two times a day  pantoprazole    Tablet 40 milliGRAM(s) Oral before breakfast    MEDICATIONS  (PRN):  sodium chloride 0.9% lock flush 10 milliLiter(s) IV Push every 1 hour PRN Pre/post blood products, medications, blood draw, and to maintain line patency      FAMILY HISTORY:  Family history of CVA  mom, age 57    [place]    Social History:Non smoker        Allergies    penicillins (Hives)    Intolerances         REVIEW OF SYSTEMS:    as above      ICU Vital Signs Last 24 Hrs  T(C): 36.7 (15 Frederic 2023 05:16), Max: 36.8 (14 Jun 2023 17:00)  T(F): 98 (15 Frederic 2023 05:16), Max: 98.2 (14 Jun 2023 17:00)  HR: 71 (15 Frederic 2023 05:16) (71 - 81)  BP: 109/64 (15 Frederic 2023 05:16) (85/48 - 109/64)  BP(mean): --  ABP: --  ABP(mean): --  RR: 19 (15 Frederic 2023 05:16) (18 - 20)  SpO2: 96% (15 Frederic 2023 05:16) (94% - 99%)    O2 Parameters below as of 15 Frederic 2023 05:16  Patient On (Oxygen Delivery Method): room air            PHYSICAL EXAM:    GENERAL: NAD  HEAD:  Atraumatic, Normocephalic  EYES: EOMI, conjunctiva and sclera clear  ENMT: No Drainage from nares, No drainage from ears  NECK: Supple, +dialysis catheter intact  NERVOUS SYSTEM:  Awake and Alert  CHEST/LUNG: Clear to percussion bilaterally; No rales, rhonchi, wheezing, or rubs  HEART: Regular rate and rhythm; No murmurs, rubs, or gallops  ABDOMEN: Soft, Nontender, Nondistended; Bowel sounds present, obese  EXTREMITIES:  + Edema  SKIN: No rashes No obvious ecchymosis      LABS:                        9.1    15.12 )-----------( 215      ( 15 Frederic 2023 05:25 )             27.6     06-15    135  |  101  |  42<H>  ----------------------------<  181<H>  4.1   |  29  |  3.20<H>    Ca    7.6<L>      15 Frederic 2023 05:25

## 2023-06-15 NOTE — PROGRESS NOTE ADULT - ASSESSMENT
71yo F with PMHx IDDM2, non obstructive CAD,  HFrEF, HTN, hypothyroidism, LBBB, chronic lymphedema presenting with decreased appetite and multiple episodes of emesis, admitted in the ICU for septic shock 2/2 UTI.    Hypotension, Non Obs CAD, HFrEF, HTN, LBBB, Chr Lymphedema  - Pt a/w septic shock requiring ICU level of care, on pressor (now weaned off), worsening renal indices on HD, now on GMF   - BP has been soft 110-120's, with sig drop 6/13 while working with PT 80-90's systolics.   - BP now 80-100s  - + orthostatic , asymptomatic   - on midodrine 5mg PO TID, can increase to 10 mg   - Continue to hold home BB, ARNI.      - known hx of non obstructive CAD from cardiac cath (7/2022), HFrEF   - EKG : SR 1st deg AVB LBBB, unchanged from prior read   - No evidence of any active ischemia   - Continue aspirin and Lipitor     - TTE: (06.06.23) LVSF mildly EF 38 %. Moderate aortic stenosis. Trace MR, TR, The inferior vena cava is normal measuring 1.86 cm in diameter  - Renal following, HD per renal.   - LE edema improving   - +UTI on abx, per primary     - Anemia, Heme/onc consulted  - Transfuse PRN to keep hgb>8 given cardiac hx.    - Monitor and replete lytes, keep K>4, Mg>2.  - Will continue to follow.    Jose E Felipe, MS FNP, [facility]  Nurse Practitioner- Cardiology   Spectra #[phone] 71yo F with PMHx IDDM2, non obstructive CAD,  HFrEF, HTN, hypothyroidism, LBBB, chronic lymphedema presenting with decreased appetite and multiple episodes of emesis, admitted in the ICU for septic shock 2/2 UTI.    Hypotension, Non Obs CAD, HFrEF, HTN, LBBB, Chr Lymphedema  - Pt a/w septic shock requiring ICU level of care, on pressor (now weaned off), worsening renal indices on HD, now on GMF   - BP has been soft 110-120's, with sig drop 6/13 while working with PT 80-90's systolics.   - BP now 85-100s, asymptomatic  - + orthostatic , asymptomatic   - on midodrine 5mg PO TID, can increase to 10 mg     - TTE: (06.06.23) LVSF mildly EF 38 %. Moderate aortic stenosis. Trace MR, TR, The inferior vena cava is normal measuring 1.86 cm in diameter  - Continue to hold home BB, ARNI 2/2 hypotension, on Midodrine   - Renal following, HD per renal.   - LE edema improving per patient     - known hx of non obstructive CAD from cardiac cath (7/2022), HFrEF   - EKG : SR 1st deg AVB LBBB, unchanged from prior read   - No evidence of any active ischemia   - Continue aspirin and Lipitor     - +UTI on abx, per primary   - Anemia, Heme/onc consulted  - Transfuse PRN to keep hgb>8 given cardiac hx.    - Monitor and replete lytes, keep K>4, Mg>2.  - Will continue to follow.    Jose E Felipe, MS FNP, [facility]  Nurse Practitioner- Cardiology   Spectra #[phone]

## 2023-06-15 NOTE — PROGRESS NOTE ADULT - ASSESSMENT
Acute on CKD Stage 4  Sepsis/UTI  L Hydronephrosis  Uremia      -DUNCAN from sepsis/hypotension and renal hypoperfusion; unilateral mild hydro should not cause this degree of DUNCAN  -Abx, renal dosing  -Urology eval noted  -Monitor chemistries and urine output  -Renal indices previously worsened with uremic symptoms leading to dialysis; still urinating, but still documented < 1L  -Previously Consented for dialysis  -Discussed with patient risks and benefits of dialysis in depth up to and including death, she agreed to dialysis if necessary  -Initiated dialysis 6/6/23;  -Replaced Palomo 6/12/23 by IR. Plan for HD today 6/14/23;  -Monitor for recovery but urine output remains low; will monitor trend of creatinine on repeat labs; will follow up. If Cr is stable or improving, will hold HD and monitor.  -Cleared by ID for PC  -D/W IR, planned for Monday for PC  -Will plan for next HD on Sat and monitor for recovery and then If she gets PC on Monday, can dialyze or start Tue and DC    d/w primary, D/w IR department, D/w HD RN, D/W      for outpatient dialysis arrangements. We will be able to maintain follow up at [facility], [facility], [facility], [facility], [facility] and [facility]    Thank you

## 2023-06-15 NOTE — PROGRESS NOTE ADULT - SUBJECTIVE AND OBJECTIVE BOX
EVELYN LOPEZ is a 72yFemale , patient examined and chart reviewed.    INTERVAL HPI/ OVERNIGHT EVENTS:   NAD. Afebrile In chair.  Feeling well.    PAST MEDICAL & SURGICAL HISTORY:  Hypertension  Diabetes  Lymphedema  H/O left bundle branch block  History of left bundle branch block (LBBB)  Systolic heart failure, chronic  H/O cataract  2020  History of surgical removal of meniscus of knee  left in 1971  Frozen shoulder  2000  H/O Achilles tendon repair  lengthened bilaterally, 2000  Fractured skull  1968      For details regarding the patient's social history, family history, and other miscellaneous elements, please refer the initial infectious diseases consultation and/or the admitting history and physical examination for this admission.     ROS:  CONSTITUTIONAL:  Negative fever or chills  EYES:  Negative  blurry vision or double vision  CARDIOVASCULAR:  Negative for chest pain or palpitations  RESPIRATORY:  Negative for cough, wheezing, or SOB   GASTROINTESTINAL:  Negative for nausea vomiting, diarrhea, constipation, or abdominal pain   GENITOURINARY:  Negative frequency, urgency or dysuria  NEUROLOGIC:  No headache, confusion, dizziness, lightheadedness  All other systems were reviewed and are negative     ALLERGIES  penicillins (Hives)      Current inpatient medications :    ANTIBIOTICS/RELEVANT:  cefTRIAXone   IVPB 2000 milliGRAM(s) IV Intermittent every 24 hours    MEDICATIONS  (STANDING):  aspirin  chewable 81 milliGRAM(s) Oral daily  atorvastatin 80 milliGRAM(s) Oral at bedtime  dextrose 5%. 1000 milliLiter(s) (50 mL/Hr) IV Continuous <Continuous>  dextrose 50% Injectable 50 milliLiter(s) IV Push every 15 minutes  dextrose 50% Injectable 25 milliLiter(s) IV Push every 15 minutes  epoetin mendoza-epbx (RETACRIT) Injectable 95329 Unit(s) IV Push <User Schedule>  heparin   Injectable 7500 Unit(s) SubCutaneous every 8 hours  insulin glargine Injectable (LANTUS) 30 Unit(s) SubCutaneous every morning  insulin lispro (ADMELOG) corrective regimen sliding scale   SubCutaneous at bedtime  insulin lispro (ADMELOG) corrective regimen sliding scale   SubCutaneous three times a day before meals  insulin lispro Injectable (ADMELOG) 5 Unit(s) SubCutaneous three times a day before meals  levothyroxine 75 MICROGram(s) Oral daily  metoprolol succinate ER 25 milliGRAM(s) Oral daily  midodrine      midodrine 5 milliGRAM(s) Oral every 8 hours  nystatin Powder 1 Application(s) Topical two times a day  pantoprazole    Tablet 40 milliGRAM(s) Oral before breakfast    MEDICATIONS  (PRN):  sodium chloride 0.9% lock flush 10 milliLiter(s) IV Push every 1 hour PRN Pre/post blood products, medications, blood draw, and to maintain line patency        Objective:  Vital Signs Last 24 Hrs  T(C): 36.5 (15 Frederic 2023 13:53), Max: 36.8 (14 Jun 2023 17:00)  T(F): 97.7 (15 Frederic 2023 13:53), Max: 98.2 (14 Jun 2023 17:00)  HR: 75 (15 Frederic 2023 14:45) (71 - 89)  BP: 107/66 (15 Frederic 2023 14:45) (89/65 - 109/64)  RR: 17 (15 Frederic 2023 14:45) (17 - 20)  SpO2: 98% (15 Frederic 2023 14:45) (96% - 99%)    Parameters below as of 15 Frederic 2023 14:45  Patient On (Oxygen Delivery Method): room air      Physical Exam:  General: no acute distress  Neck: supple, trachea midline right IJ HD cath c/d/i  Lungs: clear, no wheeze/rhonchi  Cardiovascular: regular rate and rhythm, S1 S2  Abdomen: soft, nontender,  bowel sounds normal  Neurological: alert and oriented x3  Skin: no rash  Extremities: + edema      LABS:                        9.1    15.12 )-----------( 215      ( 15 Frederic 2023 05:25 )             27.6   06-15    135  |  101  |  42<H>  ----------------------------<  181<H>  4.1   |  29  |  3.20<H>    Ca    7.6<L>      15 Frederic 2023 05:25      MICROBIOLOGY:  Culture - Blood (collected 11 Jun 2023 14:25)  Source: .Blood Blood  Preliminary Report (12 Jun 2023 20:02):    No growth to date.    Culture - Blood (collected 11 Jun 2023 12:40)  Source: .Blood Blood  Preliminary Report (12 Jun 2023 20:02):    No growth to date.      RADIOLOGY & ADDITIONAL STUDIES:    ACC: [id number] EXAM:  CT ABDOMEN AND PELVIS OC   ORDERED BY:  PEDRO LUIS MARTIN     PROCEDURE DATE:  06/04/2023          INTERPRETATION:  CLINICAL INFORMATION: Vomiting and abdominal pain.    COMPARISON: 4/20/2023 CT abdomen pelvis    CONTRAST/COMPLICATIONS:  IV Contrast: NONE  Oral Contrast: Omnipaque 300  Complications: None reported at time of study completion    PROCEDURE:  CT of the Abdomen and Pelvis was performed.  Sagittal and coronal reformats were performed.    FINDINGS:  LOWER CHEST: Within normal limits.    LIVER: Within normal limits.  BILE DUCTS: Normal caliber.  GALLBLADDER: Within normal limits.  SPLEEN: Within normal limits.  PANCREAS: Within normal limits.  ADRENALS: Within normal limits.  KIDNEYS/URETERS: Mild left hydronephrosis but with no stone or other   obstructing lesion identified. Suggestion of left renal pelvis and   ureteral wall thickening not well evaluated without IV contrast. Kidneys   otherwise similar to prior with mild bilateral perinephric stranding. No   concerning renal mass.    BLADDER: Nondilated. No stones or filling defects.  REPRODUCTIVE ORGANS: Small calcification in the uterus. No concerning   findings.    BOWEL: No bowel obstruction. Appendix is normal.  PERITONEUM: No ascites.  VESSELS:No abdominal aortic aneurysm. Atherosclerosis similar to prior.  RETROPERITONEUM/LYMPH NODES: No lymphadenopathy.  ABDOMINAL WALL: Within normal limits.  BONES: No acute findings. Bilateral L5 spondylolysis with mild   degenerative anterolisthesis ofL5 on S1. Prominent degenerative changes   lower lumbar spine.    IMPRESSION:  Mild left hydronephrosis is new. Left ureter not dilated. Suggestion of   left renal pelvis wall thickening not well evaluated without IV contrast.   Urinary tract infection could appear this way though not definitive.   Early or mild proximal left ureteral obstruction also possible though no   stone or other obstructing lesion is evident.    No other significant change. No bowel obstruction.    Assessment :   73yo F with PMHx IDDM2, HFrEF, HTN, hypothyroidism, LBBB, chronic lymphedema admitted with septic shock and Ecoli bacteremia sec Left pyelo with hydro- no obtructive uropathy on CT    Off pressors  Worsening DUNCAN started on HD 6/6  Rising WBC Afebrile Unclear etiology -Repeat blood cultures 6/11 ngtd  WBC downtrending- no s/s of uncontrolled infectious process  Clinically stable and improving    Plan :   Monitor off antibiotics  Sp Rocephin x 10 days ended  6/14  Repeat blood cultures- NGTD  Ok from ID standpoint for permanent HD cath   HD per renal  Trend temps and cbc  Pulm toileting  Increase activity/OOB to chair  Stable from ID standpoint    D/w Dr Mcdermott    D/w Pt      Continue with present regiment.  Appropriate use of antibiotics and adverse effects reviewed.      > 35 minutes were spent in direct patient care reviewing notes, medications ,labs data/ imaging , discussion with multidisciplinary team.    Thank you for allowing me to participate in care of your patient .    Robby Clark MD  Infectious Disease  [phone]

## 2023-06-15 NOTE — PROGRESS NOTE ADULT - SUBJECTIVE AND OBJECTIVE BOX
[facility] -- Howard Kimble Pannella, Patel, Edgar Alston: Office # [phone]    Follow Up: Hypotension      Subjective/Observations: Patient seen and examined. Patient awake, alert, resting in bed. No complaints of chest pain, dyspnea, palpitations or dizziness. No signs of orthopnea or PND. Continuing with LE edema. Tolerating room air.     REVIEW OF SYSTEMS: All other review of systems are negative unless indicated above    PAST MEDICAL & SURGICAL HISTORY:  Hypertension      Hypertension      Diabetes      Lymphedema      H/O left bundle branch block      History of left bundle branch block (LBBB)      Systolic heart failure, chronic      H/O cataract  2020      History of surgical removal of meniscus of knee  left in 1971      Frozen shoulder  2000      H/O Achilles tendon repair  lengthened bilaterally, 2000      Fractured skull  1968    MEDICATIONS  (STANDING):  aspirin  chewable 81 milliGRAM(s) Oral daily  atorvastatin 80 milliGRAM(s) Oral at bedtime  dextrose 5%. 1000 milliLiter(s) (50 mL/Hr) IV Continuous <Continuous>  dextrose 50% Injectable 50 milliLiter(s) IV Push every 15 minutes  dextrose 50% Injectable 25 milliLiter(s) IV Push every 15 minutes  epoetin mendoza-epbx (RETACRIT) Injectable 90083 Unit(s) IV Push <User Schedule>  heparin   Injectable 7500 Unit(s) SubCutaneous every 8 hours  insulin glargine Injectable (LANTUS) 30 Unit(s) SubCutaneous every morning  insulin lispro (ADMELOG) corrective regimen sliding scale   SubCutaneous at bedtime  insulin lispro (ADMELOG) corrective regimen sliding scale   SubCutaneous three times a day before meals  insulin lispro Injectable (ADMELOG) 5 Unit(s) SubCutaneous three times a day before meals  levothyroxine 75 MICROGram(s) Oral daily  metoprolol succinate ER 25 milliGRAM(s) Oral daily  midodrine      midodrine 5 milliGRAM(s) Oral every 8 hours  nystatin Powder 1 Application(s) Topical two times a day  pantoprazole    Tablet 40 milliGRAM(s) Oral before breakfast    MEDICATIONS  (PRN):  sodium chloride 0.9% lock flush 10 milliLiter(s) IV Push every 1 hour PRN Pre/post blood products, medications, blood draw, and to maintain line patency    Allergies  Ativan (Rash; Urticaria)  penicillins (Hives)    Vital Signs Last 24 Hrs  T(C): 36.7 (15 Frederic 2023 05:16), Max: 36.8 (14 Jun 2023 17:00)  T(F): 98 (15 Frederic 2023 05:16), Max: 98.2 (14 Jun 2023 17:00)  HR: 71 (15 Frederic 2023 05:16) (71 - 81)  BP: 109/64 (15 Frederic 2023 05:16) (85/48 - 109/64)  BP(mean): --  RR: 19 (15 Frederic 2023 05:16) (18 - 20)  SpO2: 96% (15 Frederic 2023 05:16) (94% - 99%)    Parameters below as of 15 Frederic 2023 05:16  Patient On (Oxygen Delivery Method): room air      I&O's Summary    14 Jun 2023 07:01  -  15 Frederic 2023 07:00  --------------------------------------------------------  IN: 50 mL / OUT: 500 mL / NET: -450 mL       TELE: Not on telemetry   PHYSICAL EXAM:  Constitutional: NAD, awake and alert, Obese   HEENT: Moist Mucous Membranes, Anicteric  Pulmonary: Non-labored, breath sounds are clear bilaterally, Diminished at bases No wheezing, rales or rhonchi  Cardiovascular: Regular, S1 and S2, + murmurs, No rubs, gallops or clicks + Rt IJ HD cath   Gastrointestinal:  soft, nontender, nondistended   Lymph: +2-3 LE peripheral edema. No lymphadenopathy.   Skin: No visible rashes or ulcers.  Psych:  Mood & affect appropriate      LABS: All Labs Reviewed:                        9.1    15.12 )-----------( 215      ( 15 Frederic 2023 05:25 )             27.6                         8.9    16.87 )-----------( 202      ( 14 Jun 2023 06:03 )             27.7                         9.6    20.74 )-----------( 156      ( 13 Jun 2023 05:20 )             28.7     15 Frederic 2023 05:25    135    |  101    |  42     ----------------------------<  181    4.1     |  29     |  3.20   14 Jun 2023 06:03    133    |  99     |  66     ----------------------------<  383    5.5     |  25     |  4.10   13 Jun 2023 05:20    134    |  102    |  57     ----------------------------<  221    4.8     |  27     |  3.20     Ca    7.6        15 Frederic 2023 05:25  Ca    7.9        14 Jun 2023 06:03  Ca    7.8        13 Jun 2023 05:20  Phos  3.2       13 Jun 2023 05:20         12 Lead ECG:   Ventricular Rate 83 BPM    Atrial Rate 83 BPM    P-R Interval 264 ms    QRS Duration 170 ms    Q-T Interval 446 ms    QTC Calculation(Bazett) 524 ms    P Axis 111 degrees    R Axis -61 degrees    T Axis 118 degrees    Diagnosis Line Sinus rhythm with 1st degree AV block  Left axis deviation  Left bundle branch block  Abnormal ECG  When compared with ECG of 05-JUN-2023 09:06,  No significant change was found  Confirmed by Skylar Hernández ([id number]) on 6/14/2023 7:21:13 AM (06-13-23 @ 13:34)      TRANSTHORACIC ECHOCARDIOGRAM REPORT  ________________________________________________________________________________                                      _______       Pt. Name:       EVELYN LOPEZ Study Date:    6/6/2023  MRN:            [id number]         YOB: 1950  Accession #:    [id number]        Age:           72 years  Account#:       [id number]       Gender:        F  Heart Rate:                      Height:        70.87 in (180.00 cm)  Rhythm:                          Weight:        ( )  Blood Pressure: 119/57 mmHg      BSA/BMI:       /  ________________________________________________________________________________________  Referring Physician:    [id number] Luis Mcdermott  Interpreting Physician: Skylar Hernández MD  Primary Sonographer:    AS    CPT:               ECHO TTE WO CON COMP W [facility] - 60365.m  Indication(s):     Cardiac murmur, unspecified - R01.1  Procedure:         Transthoracic echocardiogram with 2-D, M-mode and complete                     spectral and color flow Doppler.  Ordering Location: ICU1    _______________________________________________________________________________________  CONCLUSIONS:      1. The left ventricular systolic function is mildly decreased with an ejection fraction of 38 % by Nunes's method of disks.   2. Normal right ventricular cavity size and probably normal systolic function.   3. The left atrium is mildly dilated.   4. The right atrium is normal.   5. Moderate calcification of the aortic valve leaflets.  6. Moderate aortic stenosis.   7. There is moderate calcification of the mitral valve annulus.   8. Trace mitral regurgitation.   9. Trace tricuspid regurgitation.  10. Pulmonic valve was not well visualized.  11. The inferior vena cava is normal measuring 1.86 cm in diameter, (normal <2.1cm) with normal inspiratory collapse (normal >50%) consistent with normal right atrial pressure (~3, range 0-5mmHg).    ________________________________________________________________________________________  FINDINGS:     Left Ventricle:  The left ventricular systolic function is mildly decreased with a calculated ejection fraction of 38 % by the Nunes's biplane method of disks.     Right Ventricle:  Normal right ventricular cavity size and probably normal systolic function. Tricuspid annular plane systolic excursion (TAPSE) is 2.7 cm (normal >=1.7 cm).     Left Atrium:  The left atrium is mildly dilated.     Right Atrium:  The right atrium is normal.     Aortic Valve:  There is moderate calcification of the aortic valve leaflets. There is moderate aortic stenosis. The peak transaortic velocity is 3.80 m/s, peak transaortic gradient is 57.8 mmHg and mean transaortic gradient is 31.0 mmHg with an LVOT/aortic valve VTI ratio of 0.23. The aortic valve area is estimated at 0.64 cm² by the continuity equation.     Mitral Valve:  There is mitral valve thickening of the anterior and posterior leaflets. There is moderate calcification of the mitral valve annulus. There is trace mitral regurgitation.     Tricuspid Valve:  There is trace tricuspid regurgitation.     Pulmonic Valve:  The pulmonic valve was not well visualized.     Systemic Veins:  The inferior vena cava is normal measuring 1.86 cm in diameter, (normal <2.1cm) with normal inspiratorycollapse (normal >50%) consistent with normal right atrial pressure (~3, range 0-5mmHg).  ____________________________________________________________________  QUANTITATIVE DATA  Left Ventricle Measurements                         Indexed BSA  IVSd (2D):   1.1 cm  LVPWd (2D):  1.3 cm  LVIDd (2D):  5.2 cm  LVIDs (2D):  4.3 cm  LV Mass:     247 g  LV Vol d, MOD A2C: 111.0 ml  LV Vol d, MOD A4C: 112.0 ml  LV Vol d, MOD BP:  116.7 ml  LV Vol s, MOD A2C: 70.9 ml  LV Vol s, MOD A4C: 67.8 ml  LV Vol s, MOD BP:  72.9 ml  LVOT SV MOD BP:    43.8 ml  LV EF% MOD BP:     38 %     MV E Vmax:    0.89 m/s  MV A Vmax:    1.22 m/s  MV E/A:       0.73  e' lateral:   12.60 cm/s  e' medial:    11.60 cm/s  E/e' lateral: 7.10  E/e' medial:  7.71  E/e' Average: 7.39  MV DT:        207 msec       Left Atrium Measurements     LA Diam 2D: 4.20 cm    Right Ventricle Measurements     TAPSE:            2.7 cm  TV Preeti. S':       15.50 cm/s  RV Base (RVID1):  3.5 cm  RV Mid (RVID2):   3.1 cm  RV Major (RVID3): 8.3 cm       LVOT / RVOT/ Qp/Qs Data:  LVOT Diameter: 1.90 cm  LVOT Vmax:     0.83 m/s  LVOT VTI:      17.50 cm  LVOT SV:       49.6 ml    Aortic Valve Measurements     AV Vmax:          380.0 cm/s  AV Peak Gradient: 57.8 mmHg  AV Mean Gradient: 31.0 mmHg  AVVTI:           77.6 cm  AV VTI Ratio:     0.23  AoV EOA, Contin:  0.64 cm²    Mitral Valve Measurements     MV E Vmax: 0.9 m/s  MV A Vmax: 1.2 m/s  MV E/A:    0.7       Tricuspid Valve Measurements     RA Pressure: 3 mmHg    ________________________________________________________________________________________  Diagnosing Physician: Skylar Hernández MD.  Electronically signed on 6/7/2023 at 6:14:52 PM by Skylar Hernández MD    *** Final *** [facility] -- Howard Kimble Pannella, Patel, Edgar Alston: Office # [phone]    Follow Up: Hypotension      Subjective/Observations: Patient seen and examined. Patient awake, alert, resting in bed. No complaints of chest pain, dyspnea, palpitations or dizziness. No signs of orthopnea or PND. Continuing with LE edema. Tolerating room air.     REVIEW OF SYSTEMS: All other review of systems are negative unless indicated above    PAST MEDICAL & SURGICAL HISTORY:  Hypertension      Hypertension      Diabetes      Lymphedema      H/O left bundle branch block      History of left bundle branch block (LBBB)      Systolic heart failure, chronic      H/O cataract  2020      History of surgical removal of meniscus of knee  left in 1971      Frozen shoulder  2000      H/O Achilles tendon repair  lengthened bilaterally, 2000      Fractured skull  1968    MEDICATIONS  (STANDING):  aspirin  chewable 81 milliGRAM(s) Oral daily  atorvastatin 80 milliGRAM(s) Oral at bedtime  dextrose 5%. 1000 milliLiter(s) (50 mL/Hr) IV Continuous <Continuous>  dextrose 50% Injectable 50 milliLiter(s) IV Push every 15 minutes  dextrose 50% Injectable 25 milliLiter(s) IV Push every 15 minutes  epoetin mendoza-epbx (RETACRIT) Injectable 53824 Unit(s) IV Push <User Schedule>  heparin   Injectable 7500 Unit(s) SubCutaneous every 8 hours  insulin glargine Injectable (LANTUS) 30 Unit(s) SubCutaneous every morning  insulin lispro (ADMELOG) corrective regimen sliding scale   SubCutaneous at bedtime  insulin lispro (ADMELOG) corrective regimen sliding scale   SubCutaneous three times a day before meals  insulin lispro Injectable (ADMELOG) 5 Unit(s) SubCutaneous three times a day before meals  levothyroxine 75 MICROGram(s) Oral daily  metoprolol succinate ER 25 milliGRAM(s) Oral daily  midodrine      midodrine 5 milliGRAM(s) Oral every 8 hours  nystatin Powder 1 Application(s) Topical two times a day  pantoprazole    Tablet 40 milliGRAM(s) Oral before breakfast    MEDICATIONS  (PRN):  sodium chloride 0.9% lock flush 10 milliLiter(s) IV Push every 1 hour PRN Pre/post blood products, medications, blood draw, and to maintain line patency    Allergies  Ativan (Rash; Urticaria)  penicillins (Hives)    Vital Signs Last 24 Hrs  T(C): 36.7 (15 Frederic 2023 05:16), Max: 36.8 (14 Jun 2023 17:00)  T(F): 98 (15 Frederic 2023 05:16), Max: 98.2 (14 Jun 2023 17:00)  HR: 71 (15 Frederic 2023 05:16) (71 - 81)  BP: 109/64 (15 Frederic 2023 05:16) (85/48 - 109/64)  BP(mean): --  RR: 19 (15 Frederic 2023 05:16) (18 - 20)  SpO2: 96% (15 Frederic 2023 05:16) (94% - 99%)    Parameters below as of 15 Frederic 2023 05:16  Patient On (Oxygen Delivery Method): room air      I&O's Summary    14 Jun 2023 07:01  -  15 Frederic 2023 07:00  --------------------------------------------------------  IN: 50 mL / OUT: 500 mL / NET: -450 mL       TELE: Not on telemetry   PHYSICAL EXAM:  Constitutional: NAD, awake and alert, Obese   HEENT: Moist Mucous Membranes, Anicteric  Pulmonary: Non-labored, breath sounds are clear bilaterally, Diminished at bases No wheezing, rales or rhonchi  Cardiovascular: Regular, S1 and S2, + murmurs, No rubs, gallops or clicks + Rt IJ HD cath   Gastrointestinal:  soft, nontender, nondistended   Lymph: +3 LE peripheral edema. No lymphadenopathy.   Skin: No visible rashes or ulcers.  Psych:  Mood & affect appropriate      LABS: All Labs Reviewed:                        9.1    15.12 )-----------( 215      ( 15 Frederic 2023 05:25 )             27.6                         8.9    16.87 )-----------( 202      ( 14 Jun 2023 06:03 )             27.7                         9.6    20.74 )-----------( 156      ( 13 Jun 2023 05:20 )             28.7     15 Frederic 2023 05:25    135    |  101    |  42     ----------------------------<  181    4.1     |  29     |  3.20   14 Jun 2023 06:03    133    |  99     |  66     ----------------------------<  383    5.5     |  25     |  4.10   13 Jun 2023 05:20    134    |  102    |  57     ----------------------------<  221    4.8     |  27     |  3.20     Ca    7.6        15 Frederic 2023 05:25  Ca    7.9        14 Jun 2023 06:03  Ca    7.8        13 Jun 2023 05:20  Phos  3.2       13 Jun 2023 05:20         12 Lead ECG:   Ventricular Rate 83 BPM    Atrial Rate 83 BPM    P-R Interval 264 ms    QRS Duration 170 ms    Q-T Interval 446 ms    QTC Calculation(Bazett) 524 ms    P Axis 111 degrees    R Axis -61 degrees    T Axis 118 degrees    Diagnosis Line Sinus rhythm with 1st degree AV block  Left axis deviation  Left bundle branch block  Abnormal ECG  When compared with ECG of 05-JUN-2023 09:06,  No significant change was found  Confirmed by Skylar Hernández ([id number]) on 6/14/2023 7:21:13 AM (06-13-23 @ 13:34)      TRANSTHORACIC ECHOCARDIOGRAM REPORT  ________________________________________________________________________________                                      _______       Pt. Name:       EVELYN LOPEZ Study Date:    6/6/2023  MRN:            [id number]         YOB: 1950  Accession #:    [id number]        Age:           72 years  Account#:       [id number]       Gender:        F  Heart Rate:                      Height:        70.87 in (180.00 cm)  Rhythm:                          Weight:        ( )  Blood Pressure: 119/57 mmHg      BSA/BMI:       /  ________________________________________________________________________________________  Referring Physician:    [id number] Luis Mcdermott  Interpreting Physician: Skylar Hernández MD  Primary Sonographer:    AS    CPT:               ECHO TTE WO CON COMP W [facility] - 50756.m  Indication(s):     Cardiac murmur, unspecified - R01.1  Procedure:         Transthoracic echocardiogram with 2-D, M-mode and complete                     spectral and color flow Doppler.  Ordering Location: ICU1    _______________________________________________________________________________________  CONCLUSIONS:      1. The left ventricular systolic function is mildly decreased with an ejection fraction of 38 % by Nunes's method of disks.   2. Normal right ventricular cavity size and probably normal systolic function.   3. The left atrium is mildly dilated.   4. The right atrium is normal.   5. Moderate calcification of the aortic valve leaflets.  6. Moderate aortic stenosis.   7. There is moderate calcification of the mitral valve annulus.   8. Trace mitral regurgitation.   9. Trace tricuspid regurgitation.  10. Pulmonic valve was not well visualized.  11. The inferior vena cava is normal measuring 1.86 cm in diameter, (normal <2.1cm) with normal inspiratory collapse (normal >50%) consistent with normal right atrial pressure (~3, range 0-5mmHg).    ________________________________________________________________________________________  FINDINGS:     Left Ventricle:  The left ventricular systolic function is mildly decreased with a calculated ejection fraction of 38 % by the Nunes's biplane method of disks.     Right Ventricle:  Normal right ventricular cavity size and probably normal systolic function. Tricuspid annular plane systolic excursion (TAPSE) is 2.7 cm (normal >=1.7 cm).     Left Atrium:  The left atrium is mildly dilated.     Right Atrium:  The right atrium is normal.     Aortic Valve:  There is moderate calcification of the aortic valve leaflets. There is moderate aortic stenosis. The peak transaortic velocity is 3.80 m/s, peak transaortic gradient is 57.8 mmHg and mean transaortic gradient is 31.0 mmHg with an LVOT/aortic valve VTI ratio of 0.23. The aortic valve area is estimated at 0.64 cm² by the continuity equation.     Mitral Valve:  There is mitral valve thickening of the anterior and posterior leaflets. There is moderate calcification of the mitral valve annulus. There is trace mitral regurgitation.     Tricuspid Valve:  There is trace tricuspid regurgitation.     Pulmonic Valve:  The pulmonic valve was not well visualized.     Systemic Veins:  The inferior vena cava is normal measuring 1.86 cm in diameter, (normal <2.1cm) with normal inspiratorycollapse (normal >50%) consistent with normal right atrial pressure (~3, range 0-5mmHg).  ____________________________________________________________________  QUANTITATIVE DATA  Left Ventricle Measurements                         Indexed BSA  IVSd (2D):   1.1 cm  LVPWd (2D):  1.3 cm  LVIDd (2D):  5.2 cm  LVIDs (2D):  4.3 cm  LV Mass:     247 g  LV Vol d, MOD A2C: 111.0 ml  LV Vol d, MOD A4C: 112.0 ml  LV Vol d, MOD BP:  116.7 ml  LV Vol s, MOD A2C: 70.9 ml  LV Vol s, MOD A4C: 67.8 ml  LV Vol s, MOD BP:  72.9 ml  LVOT SV MOD BP:    43.8 ml  LV EF% MOD BP:     38 %     MV E Vmax:    0.89 m/s  MV A Vmax:    1.22 m/s  MV E/A:       0.73  e' lateral:   12.60 cm/s  e' medial:    11.60 cm/s  E/e' lateral: 7.10  E/e' medial:  7.71  E/e' Average: 7.39  MV DT:        207 msec       Left Atrium Measurements     LA Diam 2D: 4.20 cm    Right Ventricle Measurements     TAPSE:            2.7 cm  TV Preeti. S':       15.50 cm/s  RV Base (RVID1):  3.5 cm  RV Mid (RVID2):   3.1 cm  RV Major (RVID3): 8.3 cm       LVOT / RVOT/ Qp/Qs Data:  LVOT Diameter: 1.90 cm  LVOT Vmax:     0.83 m/s  LVOT VTI:      17.50 cm  LVOT SV:       49.6 ml    Aortic Valve Measurements     AV Vmax:          380.0 cm/s  AV Peak Gradient: 57.8 mmHg  AV Mean Gradient: 31.0 mmHg  AVVTI:           77.6 cm  AV VTI Ratio:     0.23  AoV EOA, Contin:  0.64 cm²    Mitral Valve Measurements     MV E Vmax: 0.9 m/s  MV A Vmax: 1.2 m/s  MV E/A:    0.7       Tricuspid Valve Measurements     RA Pressure: 3 mmHg    ________________________________________________________________________________________  Diagnosing Physician: Skylar Hernández MD.  Electronically signed on 6/7/2023 at 6:14:52 PM by Skylar Hernández MD    *** Final ***

## 2023-06-16 LAB
ANION GAP SERPL CALC-SCNC: 6 MMOL/L — SIGNIFICANT CHANGE UP (ref 5–17)
BUN SERPL-MCNC: 42 MG/DL — HIGH (ref 7–23)
CALCIUM SERPL-MCNC: 7.8 MG/DL — LOW (ref 8.5–10.1)
CHLORIDE SERPL-SCNC: 101 MMOL/L — SIGNIFICANT CHANGE UP (ref 96–108)
CO2 SERPL-SCNC: 29 MMOL/L — SIGNIFICANT CHANGE UP (ref 22–31)
CREAT SERPL-MCNC: 3.4 MG/DL — HIGH (ref 0.5–1.3)
CULTURE RESULTS: SIGNIFICANT CHANGE UP
CULTURE RESULTS: SIGNIFICANT CHANGE UP
EGFR: 14 ML/MIN/1.73M2 — LOW
GLUCOSE SERPL-MCNC: 148 MG/DL — HIGH (ref 70–99)
HCT VFR BLD CALC: 27.5 % — LOW (ref 34.5–45)
HGB BLD-MCNC: 9 G/DL — LOW (ref 11.5–15.5)
MCHC RBC-ENTMCNC: 29.8 PG — SIGNIFICANT CHANGE UP (ref 27–34)
MCHC RBC-ENTMCNC: 32.7 GM/DL — SIGNIFICANT CHANGE UP (ref 32–36)
MCV RBC AUTO: 91.1 FL — SIGNIFICANT CHANGE UP (ref 80–100)
NRBC # BLD: 0 /100 WBCS — SIGNIFICANT CHANGE UP (ref 0–0)
PLATELET # BLD AUTO: 230 K/UL — SIGNIFICANT CHANGE UP (ref 150–400)
POTASSIUM SERPL-MCNC: 3.9 MMOL/L — SIGNIFICANT CHANGE UP (ref 3.5–5.3)
POTASSIUM SERPL-SCNC: 3.9 MMOL/L — SIGNIFICANT CHANGE UP (ref 3.5–5.3)
RBC # BLD: 3.02 M/UL — LOW (ref 3.8–5.2)
RBC # FLD: 14.5 % — SIGNIFICANT CHANGE UP (ref 10.3–14.5)
SODIUM SERPL-SCNC: 136 MMOL/L — SIGNIFICANT CHANGE UP (ref 135–145)
SPECIMEN SOURCE: SIGNIFICANT CHANGE UP
SPECIMEN SOURCE: SIGNIFICANT CHANGE UP
WBC # BLD: 12 K/UL — HIGH (ref 3.8–10.5)
WBC # FLD AUTO: 12 K/UL — HIGH (ref 3.8–10.5)

## 2023-06-16 PROCEDURE — 99232 SBSQ HOSP IP/OBS MODERATE 35: CPT

## 2023-06-16 RX ADMIN — Medication 75 MICROGRAM(S): at 06:29

## 2023-06-16 RX ADMIN — ATORVASTATIN CALCIUM 80 MILLIGRAM(S): 80 TABLET, FILM COATED ORAL at 21:35

## 2023-06-16 RX ADMIN — HEPARIN SODIUM 7500 UNIT(S): 5000 INJECTION INTRAVENOUS; SUBCUTANEOUS at 21:35

## 2023-06-16 RX ADMIN — Medication 5 UNIT(S): at 08:14

## 2023-06-16 RX ADMIN — MIDODRINE HYDROCHLORIDE 5 MILLIGRAM(S): 2.5 TABLET ORAL at 15:54

## 2023-06-16 RX ADMIN — MIDODRINE HYDROCHLORIDE 5 MILLIGRAM(S): 2.5 TABLET ORAL at 21:35

## 2023-06-16 RX ADMIN — INSULIN GLARGINE 30 UNIT(S): 100 INJECTION, SOLUTION SUBCUTANEOUS at 08:15

## 2023-06-16 RX ADMIN — Medication 5 UNIT(S): at 12:27

## 2023-06-16 RX ADMIN — HEPARIN SODIUM 7500 UNIT(S): 5000 INJECTION INTRAVENOUS; SUBCUTANEOUS at 15:54

## 2023-06-16 RX ADMIN — MIDODRINE HYDROCHLORIDE 5 MILLIGRAM(S): 2.5 TABLET ORAL at 06:28

## 2023-06-16 RX ADMIN — NYSTATIN CREAM 1 APPLICATION(S): 100000 CREAM TOPICAL at 17:07

## 2023-06-16 RX ADMIN — PANTOPRAZOLE SODIUM 40 MILLIGRAM(S): 20 TABLET, DELAYED RELEASE ORAL at 06:28

## 2023-06-16 RX ADMIN — NYSTATIN CREAM 1 APPLICATION(S): 100000 CREAM TOPICAL at 06:31

## 2023-06-16 RX ADMIN — HEPARIN SODIUM 7500 UNIT(S): 5000 INJECTION INTRAVENOUS; SUBCUTANEOUS at 06:30

## 2023-06-16 RX ADMIN — Medication 5 UNIT(S): at 17:06

## 2023-06-16 NOTE — PROGRESS NOTE ADULT - SUBJECTIVE AND OBJECTIVE BOX
Date of Service 06-16-23 @ 11:51    Patient is a 72y old  Female who presents with a chief complaint of sepsis (16 Jun 2023 10:33)      INTERVAL /OVERNIGHT EVENTS: waiting for perma cath    MEDICATIONS  (STANDING):  atorvastatin 80 milliGRAM(s) Oral at bedtime  dextrose 5%. 1000 milliLiter(s) (50 mL/Hr) IV Continuous <Continuous>  dextrose 50% Injectable 50 milliLiter(s) IV Push every 15 minutes  dextrose 50% Injectable 25 milliLiter(s) IV Push every 15 minutes  epoetin mendoza-epbx (RETACRIT) Injectable 31822 Unit(s) IV Push <User Schedule>  heparin   Injectable 7500 Unit(s) SubCutaneous every 8 hours  insulin glargine Injectable (LANTUS) 30 Unit(s) SubCutaneous every morning  insulin lispro (ADMELOG) corrective regimen sliding scale   SubCutaneous three times a day before meals  insulin lispro (ADMELOG) corrective regimen sliding scale   SubCutaneous at bedtime  insulin lispro Injectable (ADMELOG) 5 Unit(s) SubCutaneous three times a day before meals  levothyroxine 75 MICROGram(s) Oral daily  metoprolol succinate ER 25 milliGRAM(s) Oral daily  midodrine 5 milliGRAM(s) Oral every 8 hours  midodrine      nystatin Powder 1 Application(s) Topical two times a day  pantoprazole    Tablet 40 milliGRAM(s) Oral before breakfast    MEDICATIONS  (PRN):  sodium chloride 0.9% lock flush 10 milliLiter(s) IV Push every 1 hour PRN Pre/post blood products, medications, blood draw, and to maintain line patency      Allergies    Ativan (Rash; Urticaria)  penicillins (Hives)    Intolerances        REVIEW OF SYSTEMS:  CONSTITUTIONAL: No fever, weight loss, or fatigue  EYES: No eye pain, visual disturbances, or discharge  ENMT:  No difficulty hearing, tinnitus, vertigo; No sinus or throat pain  NECK: No pain or stiffness  RESPIRATORY: No cough, wheezing, chills or hemoptysis; No shortness of breath  CARDIOVASCULAR: No chest pain, palpitations, dizziness, or leg swelling  GASTROINTESTINAL: No abdominal or epigastric pain. No nausea, vomiting, or hematemesis; No diarrhea or constipation. No melena or hematochezia.  GENITOURINARY: No dysuria, frequency, hematuria, or incontinence  NEUROLOGICAL: No headaches, memory loss, loss of strength, numbness, or tremors  SKIN: No itching, burning, rashes, or lesions   LYMPH NODES: No enlarged glands  ENDOCRINE: No heat or cold intolerance; No hair loss; No polydipsia or polyuria  MUSCULOSKELETAL: No joint pain or swelling; No muscle, back, or extremity pain  PSYCHIATRIC: No depression, anxiety, mood swings, or difficulty sleeping  HEME/LYMPH: No easy bruising, or bleeding gums  ALLERGY AND IMMUNOLOGIC: No hives or eczema    Vital Signs Last 24 Hrs  T(C): 36.7 (16 Jun 2023 05:10), Max: 36.7 (16 Jun 2023 05:10)  T(F): 98 (16 Jun 2023 05:10), Max: 98 (16 Jun 2023 05:10)  HR: 77 (16 Jun 2023 05:10) (75 - 89)  BP: 120/66 (16 Jun 2023 05:10) (98/54 - 140/62)  BP(mean): 77 (15 Frederic 2023 22:23) (77 - 77)  RR: 19 (16 Jun 2023 05:10) (17 - 19)  SpO2: 96% (16 Jun 2023 05:10) (96% - 98%)    Parameters below as of 16 Jun 2023 05:10  Patient On (Oxygen Delivery Method): room air        PHYSICAL EXAM:  GENERAL: NAD, well-groomed, well-developed  HEAD:  Atraumatic, Normocephalic  EYES: EOMI, PERRLA, conjunctiva and sclera clear  ENMT: No tonsillar erythema, exudates, or enlargement; Moist mucous membranes, Good dentition, No lesions  NECK: Supple, No JVD, Normal thyroid  NERVOUS SYSTEM:  Alert & Oriented X3, Good concentration; Motor Strength 5/5 B/L upper and lower extremities; DTRs 2+ intact and symmetric  CHEST/LUNG: Clear to auscultation bilaterally; No rales, rhonchi, wheezing, or rubs  HEART: Regular rate and rhythm; No murmurs, rubs, or gallops  ABDOMEN: Soft, Nontender, Nondistended; Bowel sounds present  EXTREMITIES:  2+ Peripheral Pulses, No clubbing, cyanosis, + edema  LYMPH: No lymphadenopathy noted  SKIN: No rashes or lesions    LABS:    16 Jun 2023 08:55    136    |  101    |  42     ----------------------------<  148    3.9     |  29     |  3.40     Ca    7.8        16 Jun 2023 08:55          CAPILLARY BLOOD GLUCOSE      POCT Blood Glucose.: 132 mg/dL (16 Jun 2023 07:51)  POCT Blood Glucose.: 110 mg/dL (15 Frederic 2023 21:40)  POCT Blood Glucose.: 93 mg/dL (15 Frederic 2023 16:48)  POCT Blood Glucose.: 162 mg/dL (15 Frederic 2023 12:00)      RADIOLOGY & ADDITIONAL TESTS:    Notes Reviewed:  [ x] YES  [ ] NO    Care Discussed with Consultants/Other Providers [x ] YES  [ ] NO

## 2023-06-16 NOTE — SOCIAL WORK PROGRESS NOTE - NSSWPROGRESSNOTE_GEN_ALL_CORE
Call received from [employer], per representative Ray a lot is available for the patient 2nd shift 9:45am (M,W,F). [employer] phone ([phone]) address [address] [place] [address]. [place] [address]. [facility] team [facility] Tuesday, representative/Ray provided with update. SW to remain available and to continue to follow up.

## 2023-06-16 NOTE — PROGRESS NOTE ADULT - SUBJECTIVE AND OBJECTIVE BOX
[facility] -- Shyanne Mueller, Goran, Howard, Carlos Luong Savella  Office # [phone]      Follow Up:    Hypotension  Subjective/Observations:   No events overnight resting comfortably in bed.  No complaints of chest pain, dyspnea, or palpitations reported. No signs of orthopnea or PND. Denies dizziness/lightheadness     REVIEW OF SYSTEMS: All other review of systems is negative unless indicated above    PAST MEDICAL & SURGICAL HISTORY:  Hypertension      Diabetes      Lymphedema      H/O left bundle branch block      History of left bundle branch block (LBBB)      Systolic heart failure, chronic      H/O cataract  2020      History of surgical removal of meniscus of knee  left in 1971      Frozen shoulder  2000      H/O Achilles tendon repair  lengthened bilaterally, 2000      Fractured skull  1968          MEDICATIONS  (STANDING):  aspirin  chewable 81 milliGRAM(s) Oral daily  atorvastatin 80 milliGRAM(s) Oral at bedtime  dextrose 5%. 1000 milliLiter(s) (50 mL/Hr) IV Continuous <Continuous>  dextrose 50% Injectable 50 milliLiter(s) IV Push every 15 minutes  dextrose 50% Injectable 25 milliLiter(s) IV Push every 15 minutes  epoetin mendoza-epbx (RETACRIT) Injectable 59832 Unit(s) IV Push <User Schedule>  heparin   Injectable 7500 Unit(s) SubCutaneous every 8 hours  insulin glargine Injectable (LANTUS) 30 Unit(s) SubCutaneous every morning  insulin lispro (ADMELOG) corrective regimen sliding scale   SubCutaneous three times a day before meals  insulin lispro (ADMELOG) corrective regimen sliding scale   SubCutaneous at bedtime  insulin lispro Injectable (ADMELOG) 5 Unit(s) SubCutaneous three times a day before meals  levothyroxine 75 MICROGram(s) Oral daily  metoprolol succinate ER 25 milliGRAM(s) Oral daily  midodrine 5 milliGRAM(s) Oral every 8 hours  midodrine      nystatin Powder 1 Application(s) Topical two times a day  pantoprazole    Tablet 40 milliGRAM(s) Oral before breakfast    MEDICATIONS  (PRN):  sodium chloride 0.9% lock flush 10 milliLiter(s) IV Push every 1 hour PRN Pre/post blood products, medications, blood draw, and to maintain line patency      Allergies    Ativan (Rash; Urticaria)  penicillins (Hives)    Intolerances        Vital Signs Last 24 Hrs  T(C): 36.7 (16 Jun 2023 05:10), Max: 36.7 (16 Jun 2023 05:10)  T(F): 98 (16 Jun 2023 05:10), Max: 98 (16 Jun 2023 05:10)  HR: 77 (16 Jun 2023 05:10) (75 - 89)  BP: 120/66 (16 Jun 2023 05:10) (98/54 - 140/62)  BP(mean): 77 (15 Frederic 2023 22:23) (77 - 77)  RR: 19 (16 Jun 2023 05:10) (17 - 19)  SpO2: 96% (16 Jun 2023 05:10) (96% - 98%)    Parameters below as of 16 Jun 2023 05:10  Patient On (Oxygen Delivery Method): room air        I&O's Summary        PHYSICAL EXAM:  TELE: Off tele   Constitutional: NAD, awake and alert, Obese   HEENT: Moist Mucous Membranes, Anicteric  Pulmonary: Non-labored, breath sounds are clear bilaterally, No wheezing, crackles or rhonchi  Cardiovascular: Regular, S1 and S2 nl, + murmur No rubs, gallops or clicks   Gastrointestinal: Bowel Sounds present, soft, nontender.   Lymph: No lymphadenopathy. No peripheral edema.  Skin: No visible rashes or ulcers.  Psych:  Mood & affect appropriate    LABS: All Labs Reviewed:                        9.1    15.12 )-----------( 215      ( 15 Frederic 2023 05:25 )             27.6                         8.9    16.87 )-----------( 202      ( 14 Jun 2023 06:03 )             27.7     16 Jun 2023 08:55    136    |  101    |  42     ----------------------------<  148    3.9     |  29     |  3.40   15 Frederic 2023 05:25    135    |  101    |  42     ----------------------------<  181    4.1     |  29     |  3.20   14 Jun 2023 06:03    133    |  99     |  66     ----------------------------<  383    5.5     |  25     |  4.10     Ca    7.8        16 Jun 2023 08:55  Ca    7.6        15 Jun 2023 05:25  Ca    7.9        14 Jun 2023 06:03

## 2023-06-16 NOTE — PROGRESS NOTE ADULT - SUBJECTIVE AND OBJECTIVE BOX
CAPILLARY BLOOD GLUCOSE      POCT Blood Glucose.: 110 mg/dL (15 Frederic 2023 21:40)  POCT Blood Glucose.: 93 mg/dL (15 Frederic 2023 16:48)  POCT Blood Glucose.: 162 mg/dL (15 Frederic 2023 12:00)  POCT Blood Glucose.: 162 mg/dL (15 Frederic 2023 07:53)      Vital Signs Last 24 Hrs  T(C): 36.7 (16 Jun 2023 05:10), Max: 36.7 (16 Jun 2023 05:10)  T(F): 98 (16 Jun 2023 05:10), Max: 98 (16 Jun 2023 05:10)  HR: 77 (16 Jun 2023 05:10) (75 - 89)  BP: 120/66 (16 Jun 2023 05:10) (98/54 - 140/62)  BP(mean): 77 (15 Frederic 2023 22:23) (77 - 77)  RR: 19 (16 Jun 2023 05:10) (17 - 19)  SpO2: 96% (16 Jun 2023 05:10) (96% - 98%)    Parameters below as of 16 Jun 2023 05:10  Patient On (Oxygen Delivery Method): room air        General: WN/WD NAD  Respiratory: CTA B/L  CV: RRR, S1S2, no murmurs, rubs or gallops  Abdominal: Soft, NT, ND +BS, Last BM  Extremities: No edema, + peripheral pulses     06-15    135  |  101  |  42<H>  ----------------------------<  181<H>  4.1   |  29  |  3.20<H>    Ca    7.6<L>      15 Frederic 2023 05:25        atorvastatin 80 milliGRAM(s) Oral at bedtime  dextrose 50% Injectable 50 milliLiter(s) IV Push every 15 minutes  dextrose 50% Injectable 25 milliLiter(s) IV Push every 15 minutes  insulin glargine Injectable (LANTUS) 30 Unit(s) SubCutaneous every morning  insulin lispro (ADMELOG) corrective regimen sliding scale   SubCutaneous three times a day before meals  insulin lispro (ADMELOG) corrective regimen sliding scale   SubCutaneous at bedtime  insulin lispro Injectable (ADMELOG) 5 Unit(s) SubCutaneous three times a day before meals  levothyroxine 75 MICROGram(s) Oral daily

## 2023-06-16 NOTE — PROGRESS NOTE ADULT - SUBJECTIVE AND OBJECTIVE BOX
EVELYN LOPEZ is a 72yFemale , patient examined and chart reviewed.    INTERVAL HPI/ OVERNIGHT EVENTS:   NAD. Afebrile In chair.  Feeling well. No events.    PAST MEDICAL & SURGICAL HISTORY:  Hypertension  Diabetes  Lymphedema  H/O left bundle branch block  History of left bundle branch block (LBBB)  Systolic heart failure, chronic  H/O cataract  2020  History of surgical removal of meniscus of knee  left in 1971  Frozen shoulder  2000  H/O Achilles tendon repair  lengthened bilaterally, 2000  Fractured skull  1968      For details regarding the patient's social history, family history, and other miscellaneous elements, please refer the initial infectious diseases consultation and/or the admitting history and physical examination for this admission.     ROS:  CONSTITUTIONAL:  Negative fever or chills  EYES:  Negative  blurry vision or double vision  CARDIOVASCULAR:  Negative for chest pain or palpitations  RESPIRATORY:  Negative for cough, wheezing, or SOB   GASTROINTESTINAL:  Negative for nausea vomiting, diarrhea, constipation, or abdominal pain   GENITOURINARY:  Negative frequency, urgency or dysuria  NEUROLOGIC:  No headache, confusion, dizziness, lightheadedness  All other systems were reviewed and are negative     ALLERGIES  penicillins (Hives)      Current inpatient medications :    ANTIBIOTICS/RELEVANT:    MEDICATIONS  (STANDING):  atorvastatin 80 milliGRAM(s) Oral at bedtime  dextrose 5%. 1000 milliLiter(s) (50 mL/Hr) IV Continuous <Continuous>  dextrose 50% Injectable 50 milliLiter(s) IV Push every 15 minutes  dextrose 50% Injectable 25 milliLiter(s) IV Push every 15 minutes  epoetin mendoza-epbx (RETACRIT) Injectable 01099 Unit(s) IV Push <User Schedule>  heparin   Injectable 7500 Unit(s) SubCutaneous every 8 hours  insulin glargine Injectable (LANTUS) 30 Unit(s) SubCutaneous every morning  insulin lispro (ADMELOG) corrective regimen sliding scale   SubCutaneous three times a day before meals  insulin lispro (ADMELOG) corrective regimen sliding scale   SubCutaneous at bedtime  insulin lispro Injectable (ADMELOG) 5 Unit(s) SubCutaneous three times a day before meals  levothyroxine 75 MICROGram(s) Oral daily  metoprolol succinate ER 25 milliGRAM(s) Oral daily  midodrine 5 milliGRAM(s) Oral every 8 hours  midodrine      nystatin Powder 1 Application(s) Topical two times a day  pantoprazole    Tablet 40 milliGRAM(s) Oral before breakfast    MEDICATIONS  (PRN):  sodium chloride 0.9% lock flush 10 milliLiter(s) IV Push every 1 hour PRN Pre/post blood products, medications, blood draw, and to maintain line patency        Objective:  Vital Signs Last 24 Hrs  T(C): 36.6 (16 Jun 2023 13:26), Max: 36.7 (16 Jun 2023 05:10)  T(F): 97.8 (16 Jun 2023 13:26), Max: 98 (16 Jun 2023 05:10)  HR: 73 (16 Jun 2023 15:58) (73 - 88)  BP: 93/49 (16 Jun 2023 15:58) (93/49 - 140/62)  BP(mean): 77 (15 Frederic 2023 22:23) (77 - 77)  RR: 17 (16 Jun 2023 13:26) (17 - 19)  SpO2: 93% (16 Jun 2023 13:26) (93% - 97%)    Parameters below as of 16 Jun 2023 13:26  Patient On (Oxygen Delivery Method): room air    Physical Exam:  General: no acute distress  Neck: supple, trachea midline right IJ HD cath c/d/i  Lungs: clear, no wheeze/rhonchi  Cardiovascular: regular rate and rhythm, S1 S2  Abdomen: soft, nontender,  bowel sounds normal  Neurological: alert and oriented x3  Skin: no rash  Extremities: + edema      LABS:                        9.0    12.00 )-----------( 230      ( 16 Jun 2023 15:30 )             27.5   06-16    136  |  101  |  42<H>  ----------------------------<  148<H>  3.9   |  29  |  3.40<H>    Ca    7.8<L>      16 Jun 2023 08:55      MICROBIOLOGY:  Culture - Blood (collected 11 Jun 2023 14:25)  Source: .Blood Blood  Preliminary Report (12 Jun 2023 20:02):    No growth to date.    Culture - Blood (collected 11 Jun 2023 12:40)  Source: .Blood Blood  Preliminary Report (12 Jun 2023 20:02):    No growth to date.      RADIOLOGY & ADDITIONAL STUDIES:    ACC: [id number] EXAM:  CT ABDOMEN AND PELVIS OC   ORDERED BY:  PEDRO LUIS MARTIN     PROCEDURE DATE:  06/04/2023          INTERPRETATION:  CLINICAL INFORMATION: Vomiting and abdominal pain.    COMPARISON: 4/20/2023 CT abdomen pelvis    CONTRAST/COMPLICATIONS:  IV Contrast: NONE  Oral Contrast: Omnipaque 300  Complications: None reported at time of study completion    PROCEDURE:  CT of the Abdomen and Pelvis was performed.  Sagittal and coronal reformats were performed.    FINDINGS:  LOWER CHEST: Within normal limits.    LIVER: Within normal limits.  BILE DUCTS: Normal caliber.  GALLBLADDER: Within normal limits.  SPLEEN: Within normal limits.  PANCREAS: Within normal limits.  ADRENALS: Within normal limits.  KIDNEYS/URETERS: Mild left hydronephrosis but with no stone or other   obstructing lesion identified. Suggestion of left renal pelvis and   ureteral wall thickening not well evaluated without IV contrast. Kidneys   otherwise similar to prior with mild bilateral perinephric stranding. No   concerning renal mass.    BLADDER: Nondilated. No stones or filling defects.  REPRODUCTIVE ORGANS: Small calcification in the uterus. No concerning   findings.    BOWEL: No bowel obstruction. Appendix is normal.  PERITONEUM: No ascites.  VESSELS:No abdominal aortic aneurysm. Atherosclerosis similar to prior.  RETROPERITONEUM/LYMPH NODES: No lymphadenopathy.  ABDOMINAL WALL: Within normal limits.  BONES: No acute findings. Bilateral L5 spondylolysis with mild   degenerative anterolisthesis ofL5 on S1. Prominent degenerative changes   lower lumbar spine.    IMPRESSION:  Mild left hydronephrosis is new. Left ureter not dilated. Suggestion of   left renal pelvis wall thickening not well evaluated without IV contrast.   Urinary tract infection could appear this way though not definitive.   Early or mild proximal left ureteral obstruction also possible though no   stone or other obstructing lesion is evident.    No other significant change. No bowel obstruction.    Assessment :   71yo F with PMHx IDDM2, HFrEF, HTN, hypothyroidism, LBBB, chronic lymphedema admitted with septic shock and Ecoli bacteremia sec Left pyelo with hydro- no obtructive uropathy on CT    Off pressors  Worsening DUNCAN started on HD 6/6  Rising WBC Afebrile Unclear etiology -Repeat blood cultures 6/11 ngtd  WBC downtrending  Clinically stable and improving    Plan :   Monitor off antibiotics  Sp Rocephin x 10 days ended  6/14  Repeat blood cultures- NGTD  Ok from ID standpoint for permanent HD cath - scheduled for 6/19  HD per renal  Trend temps and cbc  Pulm toileting  Increase activity/OOB to chair  Stable from ID standpoint    D/w pt Questions answered.      Continue with present regiment.  Appropriate use of antibiotics and adverse effects reviewed.      > 35 minutes were spent in direct patient care reviewing notes, medications ,labs data/ imaging , discussion with multidisciplinary team.    Thank you for allowing me to participate in care of your patient .    Robby Clark MD  Infectious Disease  [phone]

## 2023-06-16 NOTE — PROGRESS NOTE ADULT - ASSESSMENT
71yo F with PMHx IDDM2, non obstructive CAD,  HFrEF, HTN, hypothyroidism, LBBB, chronic lymphedema presenting with decreased appetite and multiple episodes of emesis, admitted in the ICU for septic shock 2/2 UTI.    Hypotension, Non Obs CAD, HFrEF, HTN, LBBB, Chr Lymphedema  - Pt a/w septic shock requiring ICU level of care, on pressor (now weaned off), worsening renal indices on HD, now on GMF   - BP has been soft 110-120's, with sig drop 6/13 while working with PT 80-90's systolics.   - BP now 100s, asymptomatic  - + orthostatic , on 6/13  - on midodrine 5mg PO TID, can increase to 10 mg     - TTE: (06.06.23) LVSF mildly EF 38 %. Moderate aortic stenosis. Trace MR, TR, The inferior vena cava is normal measuring 1.86 cm in diameter  - Continue to hold home BB, ARNI 2/2 hypotension, on Midodrine   - Renal following, HD per renal.   - LE edema improving per patient     - known hx of non obstructive CAD from cardiac cath (7/2022), HFrEF   - EKG : SR 1st deg AVB LBBB, unchanged from prior read   - No evidence of any active ischemia   - Continue aspirin and Lipitor     - +UTI on abx, per primary   - Anemia, Heme/onc consulted  - Transfuse PRN to keep hgb>8 given cardiac hx.    - Monitor and replete lytes, keep K>4, Mg>2.  - Will continue to follow.    Adelso Rice DNP, ANP-c  Cardiology   Spectra #[phone]  Call Teams

## 2023-06-16 NOTE — PROGRESS NOTE ADULT - ASSESSMENT
Acute on CKD Stage 4  Sepsis/UTI  L Hydronephrosis  Uremia      -DUNCAN from sepsis/hypotension and renal hypoperfusion; unilateral mild hydro should not cause this degree of DUNCAN  -Abx, renal dosing  -Urology eval noted  -Monitor chemistries and urine output  -Renal indices previously worsened with uremic symptoms leading to dialysis; still urinating, but still documented < 1L  -Previously Consented for dialysis  -Discussed with patient risks and benefits of dialysis in depth up to and including death, she agreed to dialysis if necessary  -Initiated dialysis 6/6/23;  -Replaced Palomo 6/12/23 by IR. Plan for HD today 6/14/23;  -Monitor for recovery but urine output remains low; will monitor trend of creatinine on repeat labs; will follow up. If Cr is stable or improving, will hold HD and monitor.  -Cleared by ID for PC  -D/W IR, planned for Monday for PC  -Will plan for next HD on Sat and monitor for recovery and then If she gets PC on Monday, can dialyze or start Tue and DC  -NPO sunday night and hold heparin Monday morning      SW for outpatient dialysis arrangements. We will be able to maintain follow up at [facility], [facility], [facility], [facility], [facility] and [facility]    Thank you

## 2023-06-16 NOTE — PROGRESS NOTE ADULT - SUBJECTIVE AND OBJECTIVE BOX
Patient is a 72y old  Female who presents with a chief complaint of sepsis (04 Jun 2023 10:59)       HPI: female with history of insulin-dependent diabetes, CHF, hypertension who presented to the ED with nausea and vomiting for several days.  She has experienced no significant abdominal pain and history of multiple dark emesis and 1 episode of diarrhea today.  She denies fever or chills.  Denies URI symptoms.  She denies significant change in her urinary patters. CT performed on admission demonstrated mild left hydronephrosis without ureteral dilatation or obstructing calculus. Found to have DUNCAN and hypotension. Renal consulted for further eval. Has not seen a Nephrologist outpatient. Currently asymptomatic.      No acute events noted overnight, No N/V/SOB    PAST MEDICAL & SURGICAL HISTORY:  Hypertension      Diabetes      Lymphedema      H/O left bundle branch block      History of left bundle branch block (LBBB)      Systolic heart failure, chronic      H/O cataract  2020      History of surgical removal of meniscus of knee  left in 1971      Frozen shoulder  2000      H/O Achilles tendon repair  lengthened bilaterally, 2000      Fractured skull  1968         MEDICATIONS  (STANDING):  aspirin  chewable 81 milliGRAM(s) Oral daily  atorvastatin 80 milliGRAM(s) Oral at bedtime  cefTRIAXone   IVPB 2000 milliGRAM(s) IV Intermittent once  dextrose 5%. 1000 milliLiter(s) (50 mL/Hr) IV Continuous <Continuous>  dextrose 50% Injectable 50 milliLiter(s) IV Push every 15 minutes  dextrose 50% Injectable 25 milliLiter(s) IV Push every 15 minutes  heparin   Injectable 7500 Unit(s) SubCutaneous every 8 hours  insulin glargine Injectable (LANTUS) 25 Unit(s) SubCutaneous every morning  insulin lispro (ADMELOG) corrective regimen sliding scale   SubCutaneous at bedtime  insulin lispro (ADMELOG) corrective regimen sliding scale   SubCutaneous three times a day before meals  levothyroxine 75 MICROGram(s) Oral daily  metoprolol succinate ER 25 milliGRAM(s) Oral daily  midodrine      midodrine 5 milliGRAM(s) Oral every 8 hours  nystatin Powder 1 Application(s) Topical two times a day  pantoprazole    Tablet 40 milliGRAM(s) Oral before breakfast    MEDICATIONS  (PRN):  sodium chloride 0.9% lock flush 10 milliLiter(s) IV Push every 1 hour PRN Pre/post blood products, medications, blood draw, and to maintain line patency      FAMILY HISTORY:  Family history of CVA  mom, age 57    [place]    Social History:Non smoker        Allergies    penicillins (Hives)    Intolerances         REVIEW OF SYSTEMS:    as above    ICU Vital Signs Last 24 Hrs  T(C): 36.7 (16 Jun 2023 05:10), Max: 36.7 (16 Jun 2023 05:10)  T(F): 98 (16 Jun 2023 05:10), Max: 98 (16 Jun 2023 05:10)  HR: 77 (16 Jun 2023 05:10) (75 - 89)  BP: 120/66 (16 Jun 2023 05:10) (98/54 - 140/62)  BP(mean): 77 (15 Frederic 2023 22:23) (77 - 77)  ABP: --  ABP(mean): --  RR: 19 (16 Jun 2023 05:10) (17 - 19)  SpO2: 96% (16 Jun 2023 05:10) (96% - 98%)    O2 Parameters below as of 16 Jun 2023 05:10  Patient On (Oxygen Delivery Method): room air            PHYSICAL EXAM:    GENERAL: NAD  HEAD:  Atraumatic, Normocephalic  EYES: EOMI, conjunctiva and sclera clear  ENMT: No Drainage from nares, No drainage from ears  NECK: Supple, +dialysis catheter intact  NERVOUS SYSTEM:  Awake and Alert  CHEST/LUNG: Clear to percussion bilaterally; No rales, rhonchi, wheezing, or rubs  HEART: Regular rate and rhythm; No murmurs, rubs, or gallops  ABDOMEN: Soft, Nontender, Nondistended; Bowel sounds present, obese  EXTREMITIES:  + Edema  SKIN: No rashes No obvious ecchymosis      LABS:                                   9.1    15.12 )-----------( 215      ( 15 Frederic 2023 05:25 )             27.6     06-15    135  |  101  |  42<H>  ----------------------------<  181<H>  4.1   |  29  |  3.20<H>    Ca    7.6<L>      15 Frederic 2023 05:25

## 2023-06-17 LAB
ANION GAP SERPL CALC-SCNC: 5 MMOL/L — SIGNIFICANT CHANGE UP (ref 5–17)
BUN SERPL-MCNC: 42 MG/DL — HIGH (ref 7–23)
CALCIUM SERPL-MCNC: 7.9 MG/DL — LOW (ref 8.5–10.1)
CHLORIDE SERPL-SCNC: 101 MMOL/L — SIGNIFICANT CHANGE UP (ref 96–108)
CO2 SERPL-SCNC: 29 MMOL/L — SIGNIFICANT CHANGE UP (ref 22–31)
CREAT SERPL-MCNC: 3.3 MG/DL — HIGH (ref 0.5–1.3)
EGFR: 14 ML/MIN/1.73M2 — LOW
GLUCOSE SERPL-MCNC: 222 MG/DL — HIGH (ref 70–99)
HCT VFR BLD CALC: 26.9 % — LOW (ref 34.5–45)
HGB BLD-MCNC: 8.6 G/DL — LOW (ref 11.5–15.5)
MAGNESIUM SERPL-MCNC: 2.2 MG/DL — SIGNIFICANT CHANGE UP (ref 1.6–2.6)
MCHC RBC-ENTMCNC: 29.6 PG — SIGNIFICANT CHANGE UP (ref 27–34)
MCHC RBC-ENTMCNC: 32 GM/DL — SIGNIFICANT CHANGE UP (ref 32–36)
MCV RBC AUTO: 92.4 FL — SIGNIFICANT CHANGE UP (ref 80–100)
NRBC # BLD: 0 /100 WBCS — SIGNIFICANT CHANGE UP (ref 0–0)
PHOSPHATE SERPL-MCNC: 4.2 MG/DL — SIGNIFICANT CHANGE UP (ref 2.5–4.5)
PLATELET # BLD AUTO: 259 K/UL — SIGNIFICANT CHANGE UP (ref 150–400)
POTASSIUM SERPL-MCNC: 4.9 MMOL/L — SIGNIFICANT CHANGE UP (ref 3.5–5.3)
POTASSIUM SERPL-SCNC: 4.9 MMOL/L — SIGNIFICANT CHANGE UP (ref 3.5–5.3)
RBC # BLD: 2.91 M/UL — LOW (ref 3.8–5.2)
RBC # FLD: 14.6 % — HIGH (ref 10.3–14.5)
SODIUM SERPL-SCNC: 135 MMOL/L — SIGNIFICANT CHANGE UP (ref 135–145)
WBC # BLD: 10.53 K/UL — HIGH (ref 3.8–10.5)
WBC # FLD AUTO: 10.53 K/UL — HIGH (ref 3.8–10.5)

## 2023-06-17 PROCEDURE — 99232 SBSQ HOSP IP/OBS MODERATE 35: CPT

## 2023-06-17 RX ADMIN — INSULIN GLARGINE 30 UNIT(S): 100 INJECTION, SOLUTION SUBCUTANEOUS at 08:44

## 2023-06-17 RX ADMIN — HEPARIN SODIUM 7500 UNIT(S): 5000 INJECTION INTRAVENOUS; SUBCUTANEOUS at 22:40

## 2023-06-17 RX ADMIN — MIDODRINE HYDROCHLORIDE 5 MILLIGRAM(S): 2.5 TABLET ORAL at 14:15

## 2023-06-17 RX ADMIN — Medication 2: at 12:13

## 2023-06-17 RX ADMIN — MIDODRINE HYDROCHLORIDE 5 MILLIGRAM(S): 2.5 TABLET ORAL at 22:35

## 2023-06-17 RX ADMIN — ATORVASTATIN CALCIUM 80 MILLIGRAM(S): 80 TABLET, FILM COATED ORAL at 22:35

## 2023-06-17 RX ADMIN — NYSTATIN CREAM 1 APPLICATION(S): 100000 CREAM TOPICAL at 05:26

## 2023-06-17 RX ADMIN — Medication 4: at 07:54

## 2023-06-17 RX ADMIN — Medication 75 MICROGRAM(S): at 05:25

## 2023-06-17 RX ADMIN — Medication 5 UNIT(S): at 07:54

## 2023-06-17 RX ADMIN — Medication 25 MILLIGRAM(S): at 05:25

## 2023-06-17 RX ADMIN — NYSTATIN CREAM 1 APPLICATION(S): 100000 CREAM TOPICAL at 18:30

## 2023-06-17 RX ADMIN — HEPARIN SODIUM 7500 UNIT(S): 5000 INJECTION INTRAVENOUS; SUBCUTANEOUS at 05:25

## 2023-06-17 RX ADMIN — MIDODRINE HYDROCHLORIDE 5 MILLIGRAM(S): 2.5 TABLET ORAL at 05:25

## 2023-06-17 RX ADMIN — Medication 5 UNIT(S): at 12:13

## 2023-06-17 RX ADMIN — HEPARIN SODIUM 7500 UNIT(S): 5000 INJECTION INTRAVENOUS; SUBCUTANEOUS at 14:15

## 2023-06-17 RX ADMIN — PANTOPRAZOLE SODIUM 40 MILLIGRAM(S): 20 TABLET, DELAYED RELEASE ORAL at 07:54

## 2023-06-17 NOTE — PROGRESS NOTE ADULT - SUBJECTIVE AND OBJECTIVE BOX
Patient is a 72y old  Female who presents with a chief complaint of sepsis (04 Jun 2023 10:59)       HPI: female with history of insulin-dependent diabetes, CHF, hypertension who presented to the ED with nausea and vomiting for several days.  She has experienced no significant abdominal pain and history of multiple dark emesis and 1 episode of diarrhea today.  She denies fever or chills.  Denies URI symptoms.  She denies significant change in her urinary patters. CT performed on admission demonstrated mild left hydronephrosis without ureteral dilatation or obstructing calculus. Found to have DUNCAN and hypotension. Renal consulted for further eval. Has not seen a Nephrologist outpatient. Currently asymptomatic.      No acute events noted overnight, No N/V/SOB    PAST MEDICAL & SURGICAL HISTORY:  Hypertension      Diabetes      Lymphedema      H/O left bundle branch block      History of left bundle branch block (LBBB)      Systolic heart failure, chronic      H/O cataract  2020      History of surgical removal of meniscus of knee  left in 1971      Frozen shoulder  2000      H/O Achilles tendon repair  lengthened bilaterally, 2000      Fractured skull  1968         MEDICATIONS  (STANDING):  aspirin  chewable 81 milliGRAM(s) Oral daily  atorvastatin 80 milliGRAM(s) Oral at bedtime  cefTRIAXone   IVPB 2000 milliGRAM(s) IV Intermittent once  dextrose 5%. 1000 milliLiter(s) (50 mL/Hr) IV Continuous <Continuous>  dextrose 50% Injectable 50 milliLiter(s) IV Push every 15 minutes  dextrose 50% Injectable 25 milliLiter(s) IV Push every 15 minutes  heparin   Injectable 7500 Unit(s) SubCutaneous every 8 hours  insulin glargine Injectable (LANTUS) 25 Unit(s) SubCutaneous every morning  insulin lispro (ADMELOG) corrective regimen sliding scale   SubCutaneous at bedtime  insulin lispro (ADMELOG) corrective regimen sliding scale   SubCutaneous three times a day before meals  levothyroxine 75 MICROGram(s) Oral daily  metoprolol succinate ER 25 milliGRAM(s) Oral daily  midodrine      midodrine 5 milliGRAM(s) Oral every 8 hours  nystatin Powder 1 Application(s) Topical two times a day  pantoprazole    Tablet 40 milliGRAM(s) Oral before breakfast    MEDICATIONS  (PRN):  sodium chloride 0.9% lock flush 10 milliLiter(s) IV Push every 1 hour PRN Pre/post blood products, medications, blood draw, and to maintain line patency      FAMILY HISTORY:  Family history of CVA  mom, age 57    [place]    Social History:Non smoker        Allergies    penicillins (Hives)    Intolerances         REVIEW OF SYSTEMS:    as above    Vital Signs Last 24 Hrs  T(C): 36.9 (17 Jun 2023 05:22), Max: 36.9 (17 Jun 2023 05:22)  T(F): 98.5 (17 Jun 2023 05:22), Max: 98.5 (17 Jun 2023 05:22)  HR: 81 (17 Jun 2023 05:22) (73 - 81)  BP: 125/65 (17 Jun 2023 05:22) (93/49 - 125/65)  BP(mean): --  RR: 18 (17 Jun 2023 05:22) (17 - 19)  SpO2: 91% (17 Jun 2023 05:22) (91% - 95%)    Parameters below as of 17 Jun 2023 05:22  Patient On (Oxygen Delivery Method): room air      PHYSICAL EXAM:    GENERAL: NAD  HEAD:  Atraumatic, Normocephalic  EYES: EOMI, conjunctiva and sclera clear  ENMT: No Drainage from nares, No drainage from ears  NECK: Supple, +dialysis catheter intact  NERVOUS SYSTEM:  Awake and Alert  CHEST/LUNG: Clear to percussion bilaterally; No rales, rhonchi, wheezing, or rubs  HEART: Regular rate and rhythm; No murmurs, rubs, or gallops  ABDOMEN: Soft, Nontender, Nondistended; Bowel sounds present, obese  EXTREMITIES:  + Edema  SKIN: No rashes No obvious ecchymosis      LABS:                        8.6    10.53 )-----------( 259      ( 17 Jun 2023 06:43 )             26.9     06-17    135  |  101  |  42<H>  ----------------------------<  222<H>  4.9   |  29  |  3.30<H>    Ca    7.9<L>      17 Jun 2023 06:43  Phos  4.2     06-17  Mg     2.2     06-17

## 2023-06-17 NOTE — PROGRESS NOTE ADULT - SUBJECTIVE AND OBJECTIVE BOX
Date of Service: 06-17-23 @ 12:47    Patient is a 72y old  Female who presents with a chief complaint of sepsis (17 Jun 2023 11:19)      INTERVAL HPI/OVERNIGHT EVENTS: Patient seen and examined. NAD. No complaints.    Vital Signs Last 24 Hrs  T(C): 36.4 (17 Jun 2023 12:37), Max: 36.9 (17 Jun 2023 05:22)  T(F): 97.6 (17 Jun 2023 12:37), Max: 98.5 (17 Jun 2023 05:22)  HR: 72 (17 Jun 2023 12:37) (72 - 81)  BP: 100/61 (17 Jun 2023 12:37) (93/49 - 125/65)  BP(mean): --  RR: 18 (17 Jun 2023 12:37) (17 - 19)  SpO2: 96% (17 Jun 2023 12:37) (91% - 96%)    Parameters below as of 17 Jun 2023 12:37  Patient On (Oxygen Delivery Method): room air        06-17    135  |  101  |  42<H>  ----------------------------<  222<H>  4.9   |  29  |  3.30<H>    Ca    7.9<L>      17 Jun 2023 06:43  Phos  4.2     06-17  Mg     2.2     06-17                            8.6    10.53 )-----------( 259      ( 17 Jun 2023 06:43 )             26.9       CAPILLARY BLOOD GLUCOSE      POCT Blood Glucose.: 163 mg/dL (17 Jun 2023 11:48)  POCT Blood Glucose.: 219 mg/dL (17 Jun 2023 07:47)  POCT Blood Glucose.: 110 mg/dL (16 Jun 2023 21:37)  POCT Blood Glucose.: 117 mg/dL (16 Jun 2023 16:56)              atorvastatin 80 milliGRAM(s) Oral at bedtime  dextrose 5%. 1000 milliLiter(s) IV Continuous <Continuous>  dextrose 50% Injectable 50 milliLiter(s) IV Push every 15 minutes  dextrose 50% Injectable 25 milliLiter(s) IV Push every 15 minutes  epoetin mendoza-epbx (RETACRIT) Injectable 80862 Unit(s) IV Push <User Schedule>  heparin   Injectable 7500 Unit(s) SubCutaneous every 8 hours  insulin glargine Injectable (LANTUS) 30 Unit(s) SubCutaneous every morning  insulin lispro (ADMELOG) corrective regimen sliding scale   SubCutaneous three times a day before meals  insulin lispro (ADMELOG) corrective regimen sliding scale   SubCutaneous at bedtime  insulin lispro Injectable (ADMELOG) 5 Unit(s) SubCutaneous three times a day before meals  levothyroxine 75 MICROGram(s) Oral daily  metoprolol succinate ER 25 milliGRAM(s) Oral daily  midodrine 5 milliGRAM(s) Oral every 8 hours  midodrine      nystatin Powder 1 Application(s) Topical two times a day  pantoprazole    Tablet 40 milliGRAM(s) Oral before breakfast  sodium chloride 0.9% lock flush 10 milliLiter(s) IV Push every 1 hour PRN              REVIEW OF SYSTEMS:  CONSTITUTIONAL: No fever, no weight loss, or no fatigue  NECK: No pain, no stiffness  RESPIRATORY: No cough, no wheezing, no chills, no hemoptysis, No shortness of breath  CARDIOVASCULAR: No chest pain, no palpitations, no dizziness, no leg swelling  GASTROINTESTINAL: No abdominal pain. No nausea, no vomiting, no hematemesis; No diarrhea, no constipation. No melena, no hematochezia.  GENITOURINARY: No dysuria, no frequency, no hematuria, no incontinence  NEUROLOGICAL: No headaches, no loss of strength, no numbness, no tremors  SKIN: No itching, no burning  MUSCULOSKELETAL: No joint pain, no swelling; No muscle, no back, no extremity pain  PSYCHIATRIC: No depression, no mood swings,   HEME/LYMPH: No easy bruising, no bleeding gums  ALLERY AND IMMUNOLOGIC: No hives       Consultant(s) Notes Reviewed:  [X] YES  [ ] NO    PHYSICAL EXAM:  GENERAL: NAD  HEAD:  Atraumatic, Normocephalic  EYES: EOMI, PERRLA, conjunctiva and sclera clear  ENMT: No tonsillar erythema, exudates, or enlargement; Moist mucous membranes  NECK: Supple, No JVD  NERVOUS SYSTEM:  Awake & alert  CHEST/LUNG: Clear to auscultation bilaterally; No rales, rhonchi, wheezing,  HEART: Regular rate and rhythm  ABDOMEN: Soft, Nontender, Nondistended; Bowel sounds present  EXTREMITIES:  No clubbing, cyanosis, or edema  LYMPH: No lymphadenopathy noted  SKIN: No rashes      Advanced care planning discussed with patient/family [X] YES   [ ] NO    Advanced care planning discussed with patient/family. Patient's health status was discussed. All appropriate changes have been made regarding patient's end-of-life care. Advanced care planning forms reviewed/discussed/completed.  20 minutes spent.

## 2023-06-17 NOTE — PROGRESS NOTE ADULT - SUBJECTIVE AND OBJECTIVE BOX
[facility] -- Howard Kimble Pannella, Patel, Savella, Goodger  Office # [phone]    Follow Up:  hypotension     Subjective/Observations: seen and examined, awake, alert, resting comfortably in bed, denies chest pain, dyspnea, palpitations or dizziness, orthopnea and PND. Tolerating room air. ROXANE edgar intact     REVIEW OF SYSTEMS: All other review of systems is negative unless indicated above  PAST MEDICAL & SURGICAL HISTORY:  Hypertension      Diabetes      Lymphedema      H/O left bundle branch block      History of left bundle branch block (LBBB)      Systolic heart failure, chronic      H/O cataract  2020      History of surgical removal of meniscus of knee  left in 1971      Frozen shoulder  2000      H/O Achilles tendon repair  lengthened bilaterally, 2000      Fractured skull  1968        MEDICATIONS  (STANDING):  atorvastatin 80 milliGRAM(s) Oral at bedtime  dextrose 5%. 1000 milliLiter(s) (50 mL/Hr) IV Continuous <Continuous>  dextrose 50% Injectable 50 milliLiter(s) IV Push every 15 minutes  dextrose 50% Injectable 25 milliLiter(s) IV Push every 15 minutes  epoetin mendoza-epbx (RETACRIT) Injectable 39674 Unit(s) IV Push <User Schedule>  heparin   Injectable 7500 Unit(s) SubCutaneous every 8 hours  insulin glargine Injectable (LANTUS) 30 Unit(s) SubCutaneous every morning  insulin lispro (ADMELOG) corrective regimen sliding scale   SubCutaneous three times a day before meals  insulin lispro (ADMELOG) corrective regimen sliding scale   SubCutaneous at bedtime  insulin lispro Injectable (ADMELOG) 5 Unit(s) SubCutaneous three times a day before meals  levothyroxine 75 MICROGram(s) Oral daily  metoprolol succinate ER 25 milliGRAM(s) Oral daily  midodrine 5 milliGRAM(s) Oral every 8 hours  midodrine      nystatin Powder 1 Application(s) Topical two times a day  pantoprazole    Tablet 40 milliGRAM(s) Oral before breakfast    MEDICATIONS  (PRN):  sodium chloride 0.9% lock flush 10 milliLiter(s) IV Push every 1 hour PRN Pre/post blood products, medications, blood draw, and to maintain line patency    Allergies    Ativan (Rash; Urticaria)  penicillins (Hives)    Intolerances      Vital Signs Last 24 Hrs  T(C): 36.9 (17 Jun 2023 05:22), Max: 36.9 (17 Jun 2023 05:22)  T(F): 98.5 (17 Jun 2023 05:22), Max: 98.5 (17 Jun 2023 05:22)  HR: 81 (17 Jun 2023 05:22) (73 - 81)  BP: 125/65 (17 Jun 2023 05:22) (93/49 - 125/65)  BP(mean): --  RR: 18 (17 Jun 2023 05:22) (17 - 19)  SpO2: 91% (17 Jun 2023 05:22) (91% - 95%)    Parameters below as of 17 Jun 2023 05:22  Patient On (Oxygen Delivery Method): room air      I&O's Summary      TELE: Not on telemetry   PHYSICAL EXAM:  Constitutional: NAD, awake and alert,obese  HEENT: Moist Mucous Membranes, Anicteric  Pulmonary: Non-labored, breath sounds are clear, dim at bases bilaterally, No wheezing, rales or rhonchi  Cardiovascular: Regular, S1 and S2, + murmurs, rubs, gallops or clicks  Gastrointestinal: Bowel Sounds present, soft, nontender.   Lymph: +3peripheral edema. No lymphadenopathy.  Skin: No visible rashes or ulcers.+ Rt IJ HD cath   Psych:  Mood & affect appropriate  LABS: All Labs Reviewed:                        8.6    10.53 )-----------( 259      ( 17 Jun 2023 06:43 )             26.9                         9.0    12.00 )-----------( 230      ( 16 Jun 2023 15:30 )             27.5                         9.1    15.12 )-----------( 215      ( 15 Frederic 2023 05:25 )             27.6     17 Jun 2023 06:43    135    |  101    |  42     ----------------------------<  222    4.9     |  29     |  3.30   16 Jun 2023 08:55    136    |  101    |  42     ----------------------------<  148    3.9     |  29     |  3.40   15 Frederic 2023 05:25    135    |  101    |  42     ----------------------------<  181    4.1     |  29     |  3.20     Ca    7.9        17 Jun 2023 06:43  Ca    7.8        16 Jun 2023 08:55  Ca    7.6        15 Frederic 2023 05:25  Phos  4.2       17 Jun 2023 06:43  Mg     2.2       17 Jun 2023 06:43            12 Lead ECG:   Ventricular Rate 83 BPM    Atrial Rate 83 BPM    P-R Interval 264 ms    QRS Duration 170 ms    Q-T Interval 446 ms    QTC Calculation(Bazett) 524 ms    P Axis 111 degrees    R Axis -61 degrees    T Axis 118 degrees    Diagnosis Line Sinus rhythm with 1st degree AV block  Left axis deviation  Left bundle branch block  Abnormal ECG  When compared with ECG of 05-JUN-2023 09:06,  No significant change was found  Confirmed by Skylar Hernández ([id number]) on 6/14/2023 7:21:13 AM (06-13-23 @ 13:34)      ACC: [id number] EXAM:  ECHO TTE WITH CON COMP W DOPP                          PROCEDURE DATE:  04/04/2022          INTERPRETATION:  INDICATION: Abnormal EKG  Sonographer AS    Blood Pressure 161/57    Height 180.3 cm     Weight 158.8 kg       BSA   2.8 sq m    Dimensions:  LA 4.2       Normal Values: 2.0 - 4.0 cm  Ao 3.3        Normal Values: 2.0 - 3.8 cm  SEPTUM 1.2       Normal Values: 0.6 - 1.2 cm  PWT 1.3       Normal Values: 0.6 - 1.1 cm  LVIDd 4.9         Normal Values: 3.0 - 5.6 cm  LVIDs 4.1         Normal Values: 1.8 - 4.0 cm      OBSERVATIONS:  Technically difficult and very limited study  Mitral Valve: Mitral annular calcification, trace physiologic MR.  Aortic Valve/Aorta: Aortic valve is not well-visualized. In limited views   the valve appears calcified with decreased opening. Peak transaortic   valve gradient is 26 mmHg with a mean transaortic valve gradient 15.4   mmHg.  Tricuspid Valve: Not well-visualized  Pulmonic Valve: Not well-visualized  Left Atrium: normal  Right Atrium: Not well-visualized  Left Ventricle: The left ventricular endocardium is not well-visualized.   Decreased global left ventricular systolic dysfunction with an LVEF of   35-40%. Endocardium visualization enhanced with intravenous injection of   ultrasonic enhancing agent (Definity)  Right Ventricle: Not well-visualized  Pericardium: no significant pericardial effusion.  Pulmonary/RV Pressure: estimated PA systolic pressure of 45 mmHg assuming   an RA pressure of 10 mmHg.  LV diastolic dysfunction is present        IMPRESSION:  Technically difficult and very limited study  The left ventricular endocardium is not well-visualized. Decreased global   left ventricular systolic dysfunction with an LVEF of 35-40%. Endocardium   visualization enhanced with intravenous injection of ultrasonic enhancing   agent (Definity)  The right ventricle is not well-visualized  The aortic valve is not well-visualized. In limited views the valve   appears calcified with decreased opening. Peak transaortic valve gradient   is 26 mmHg with a mean transaortic valve gradient 15.4 mmHg.  Trace physiologic MR  No significant pericardial effusion.    --- End of Report ---            SKYLAR HERNÁNDEZ MD; Attending Cardiologist  This document has been electronically signed. Apr 5 2022  5:17PM

## 2023-06-17 NOTE — PROGRESS NOTE ADULT - ASSESSMENT
Acute on CKD Stage 4  Sepsis/UTI  L Hydronephrosis  Uremia      -DUNCAN from sepsis/hypotension and renal hypoperfusion; unilateral mild hydro should not cause this degree of DUNCAN  -Abx, renal dosing  -Urology eval noted  -Monitor chemistries and urine output  -Renal indices previously worsened with uremic symptoms leading to dialysis; still urinating, but still documented < 1L  -Previously Consented for dialysis  -Discussed with patient risks and benefits of dialysis in depth up to and including death, she agreed to dialysis if necessary  -Initiated dialysis 6/6/23;  -Replaced Palomo 6/12/23 by IR  -Cleared by ID for PC  -D/W IR, planned for Monday for PC tentatively. Renal function now improving however. Will hold off HD today. May not need PC Monday  -NPO Sunday night and hold heparin Monday morning    D/w HD RN    SW for outpatient dialysis arrangements. We will be able to maintain follow up at [facility], [facility], [facility], [facility], [facility] and [facility]    Thank you

## 2023-06-17 NOTE — PROVIDER CONTACT NOTE (HYPOGLYCEMIA EVENT) - NS PROVIDER CONTACT NOTE-TREATMENT-HYPO
4 oz Fruit Juice (Specify quantity, date/time)
8Oz of Apple Juice at 1638/4 oz Fruit Juice (Specify quantity, date/time)

## 2023-06-17 NOTE — PROGRESS NOTE ADULT - ASSESSMENT
71yo F with PMHx IDDM2, non obstructive CAD,  HFrEF, HTN, hypothyroidism, LBBB, chronic lymphedema presenting with decreased appetite and multiple episodes of emesis, admitted in the ICU for septic shock 2/2 UTI.    Hypotension, Non Obs CAD, HFrEF, HTN, LBBB, Chr Lymphedema  - a/w septic shock requiring ICU level of care, on pressor (now weaned off), worsening renal indices on HD, now on GMF     - BP has been soft 110-120's, with sig drop 6/13 while working with PT 80-90's systolics,  + orthostatic   - BP remains soft 90- 120's, asymptomatic   - continue midodrine 5mg PO TID, can increase to 10 mg     - TTE: (06.06.23) LVSF mildly EF 38 %. Moderate aortic stenosis. Trace MR, TR, The inferior vena cava is normal measuring 1.86 cm in diameter  - Continue to hold home BB, ARNI 2/2 hypotension, on Midodrine   - Renal following, HD per renal for session today   - LE edema improving per patient   - overall appears compensated from HF POV     - known hx of non obstructive CAD from cardiac cath (7/2022), HFrEF   - EKG : SR 1st deg AVB LBBB, unchanged from prior read   - No evidence of any active ischemia   - Continue aspirin and Lipitor     - +UTI on abx, per primary   - Anemia, Heme/onc consulted  - Transfuse PRN to keep hgb>8 given cardiac hx.    - Monitor and replete Lytes. Keep K > 4 and Mg > 2  - Will continue to follow.    Azalea Murillo [facility]  Nurse Practitioner - Cardiology   Spectra #[phone]/ [phone]  call TEAMS     71yo F with PMHx IDDM2, non obstructive CAD,  HFrEF, HTN, hypothyroidism, LBBB, chronic lymphedema presenting with decreased appetite and multiple episodes of emesis, admitted in the ICU for septic shock 2/2 UTI.    Hypotension, Non Obs CAD, HFrEF, HTN, LBBB, Chr Lymphedema  - a/w septic shock requiring ICU level of care, on pressor (now weaned off), worsening renal indices on HD, now on GMF     - BP has been soft 110-120's, with sig drop 6/13 while working with PT 80-90's systolics,  + orthostatic   - BP remains soft 90- 120's, asymptomatic   - continue midodrine 5mg PO TID, can increase to 10 mg if necessary    - TTE: (06.06.23) LVSF mildly EF 38 %. Moderate aortic stenosis. Trace MR, TR, The inferior vena cava is normal measuring 1.86 cm in diameter  - Continue to hold home BB, ARNI 2/2 hypotension, on Midodrine   - Renal following, HD per renal for session today   - LE edema improving per patient        - known hx of non obstructive CAD from cardiac cath (7/2022), HFrEF   - EKG : SR 1st deg AVB LBBB, unchanged from prior read   - No evidence of any active ischemia   - Continue aspirin and Lipitor     - +UTI on abx, per primary   - Anemia, Heme/onc consulted  - Transfuse PRN to keep hgb>8 given cardiac hx.    - Monitor and replete Lytes. Keep K > 4 and Mg > 2  - Will continue to follow.    [facility]  Nurse Practitioner - Cardiology   Spectra #[id number]/ [phone]  call TEAMS

## 2023-06-17 NOTE — CHART NOTE - NSCHARTNOTEFT_GEN_A_CORE
Assessment: Pt seen for nutrition follow-up. Chart reviewed, hospital course noted.    Brief hx: Pt is a "72-year-old female with history of insulin-dependent diabetes, CHF, hypertension who is presenting with nausea and vomiting for the last few days states no significant abdominal pain and history of multiple dark emesis and 1 episode of diarrhea today.  CT performed on admission demonstrated mild left hydronephrosis without ureteral dilatation or obstructing calculus. Found to have DUNCAN and hypotension."    Visited pt at bedside this am. Pt reports good appetite/intake. Tolerating diet well. PO intakes % per nursing documentation. Denies N/V/D/C. +BM 6/17 per pt report. Pt continues on consistent carbohydrate diet. 1500ml FR. Discussed current diet order. At first, pt declined diet education. Provided basic verbal DM diet education, pt now willing to accept written material. Pt would benefit from ongoing diet education. Receiving admelog 5 units 3x/day before meals, mod dose admelog corrective scale coverage qac/qhs. Renal function improving; holding off on HD today. RAMIRO and Radha WNL. Plan for permacath Monday.     Factors impacting intake: [X] none [ ] nausea  [ ] vomiting [ ] diarrhea [ ] constipation  [ ]chewing problems [ ] swallowing issues  [ ] other:     Diet Prescription: Diet, Consistent Carbohydrate w/Evening Snack:   1500mL Fluid Restriction ([id number]) (06-15-23 @ 16:58)    Intake: good    Current Weight: Weight (kg): 151 (04 Jun 2023 09:02), 6/12 341#, 6/17 341# (2+ BL foot/leg edema noted)  % Weight Change    Pertinent Medications: MEDICATIONS  (STANDING):  atorvastatin 80 milliGRAM(s) Oral at bedtime  dextrose 5%. 1000 milliLiter(s) (50 mL/Hr) IV Continuous <Continuous>  dextrose 50% Injectable 50 milliLiter(s) IV Push every 15 minutes  dextrose 50% Injectable 25 milliLiter(s) IV Push every 15 minutes  epoetin mendoza-epbx (RETACRIT) Injectable 59152 Unit(s) IV Push <User Schedule>  heparin   Injectable 7500 Unit(s) SubCutaneous every 8 hours  insulin glargine Injectable (LANTUS) 30 Unit(s) SubCutaneous every morning  insulin lispro (ADMELOG) corrective regimen sliding scale   SubCutaneous three times a day before meals  insulin lispro (ADMELOG) corrective regimen sliding scale   SubCutaneous at bedtime  insulin lispro Injectable (ADMELOG) 5 Unit(s) SubCutaneous three times a day before meals  levothyroxine 75 MICROGram(s) Oral daily  metoprolol succinate ER 25 milliGRAM(s) Oral daily  midodrine 5 milliGRAM(s) Oral every 8 hours  midodrine      nystatin Powder 1 Application(s) Topical two times a day  pantoprazole    Tablet 40 milliGRAM(s) Oral before breakfast    MEDICATIONS  (PRN):  sodium chloride 0.9% lock flush 10 milliLiter(s) IV Push every 1 hour PRN Pre/post blood products, medications, blood draw, and to maintain line patency    Pertinent Labs: 06-17 Na135 mmol/L Glu 222 mg/dL<H> K+ 4.9 mmol/L Cr  3.30 mg/dL<H> BUN 42 mg/dL<H> 06-17 Phos 4.2 mg/dL    CAPILLARY BLOOD GLUCOSE  POCT Blood Glucose.: 219 mg/dL (17 Jun 2023 07:47)  POCT Blood Glucose.: 110 mg/dL (16 Jun 2023 21:37)  POCT Blood Glucose.: 117 mg/dL (16 Jun 2023 16:56)  POCT Blood Glucose.: 120 mg/dL (16 Jun 2023 11:54)    Skin: ecchymotic, no pressure ulcers noted    Estimated Needs:   [X] no change since previous assessment: based on #/70.3kg  30-35kcal/kg (2109-2460kcal)  1.1-1.3g pro/kg (77-99gm protein)  [ ] recalculated:     Previous Nutrition Diagnosis:   [ ] Inadequate Energy Intake [ ]Inadequate Oral Intake [ ] Excessive Energy Intake   [ ] Underweight [ ] Increased Nutrient Needs [X] Overweight/Obesity   [X] Altered Nutrition Related Lab Values (BUN/Creat/Hgba1c) [ ] Unintended Weight Loss [ ] Food & Nutrition Related Knowledge Deficit [ ] Malnutrition     Nutrition Diagnosis is [X] ongoing  [ ] resolved [ ] not applicable     New Nutrition Diagnosis: [X] not applicable     Interventions:   Recommend  [ ] Change Diet To:  [ ] Nutrition Supplement  [ ] Nutrition Support  [X] Other: Continue current diet as ordered; monitor renal indices, consider d/c fluid restriction  Recommend Nephrovite daily    Monitoring and Evaluation:   [X] PO intake [ x ] Tolerance to diet prescription [ x ] weights [ x ] labs[ x ] follow up per protocol  [X] other: s/s GI distress, bowel function, skin integrity/ edema

## 2023-06-18 LAB
ANION GAP SERPL CALC-SCNC: 5 MMOL/L — SIGNIFICANT CHANGE UP (ref 5–17)
BUN SERPL-MCNC: 47 MG/DL — HIGH (ref 7–23)
CALCIUM SERPL-MCNC: 7.7 MG/DL — LOW (ref 8.5–10.1)
CHLORIDE SERPL-SCNC: 101 MMOL/L — SIGNIFICANT CHANGE UP (ref 96–108)
CO2 SERPL-SCNC: 27 MMOL/L — SIGNIFICANT CHANGE UP (ref 22–31)
CREAT SERPL-MCNC: 3.3 MG/DL — HIGH (ref 0.5–1.3)
EGFR: 14 ML/MIN/1.73M2 — LOW
GLUCOSE SERPL-MCNC: 243 MG/DL — HIGH (ref 70–99)
HCT VFR BLD CALC: 27.7 % — LOW (ref 34.5–45)
HGB BLD-MCNC: 8.6 G/DL — LOW (ref 11.5–15.5)
MAGNESIUM SERPL-MCNC: 2.1 MG/DL — SIGNIFICANT CHANGE UP (ref 1.6–2.6)
MCHC RBC-ENTMCNC: 29.7 PG — SIGNIFICANT CHANGE UP (ref 27–34)
MCHC RBC-ENTMCNC: 31 GM/DL — LOW (ref 32–36)
MCV RBC AUTO: 95.5 FL — SIGNIFICANT CHANGE UP (ref 80–100)
NRBC # BLD: 0 /100 WBCS — SIGNIFICANT CHANGE UP (ref 0–0)
PLATELET # BLD AUTO: 262 K/UL — SIGNIFICANT CHANGE UP (ref 150–400)
POTASSIUM SERPL-MCNC: 4.4 MMOL/L — SIGNIFICANT CHANGE UP (ref 3.5–5.3)
POTASSIUM SERPL-SCNC: 4.4 MMOL/L — SIGNIFICANT CHANGE UP (ref 3.5–5.3)
RBC # BLD: 2.9 M/UL — LOW (ref 3.8–5.2)
RBC # FLD: 14.8 % — HIGH (ref 10.3–14.5)
SODIUM SERPL-SCNC: 133 MMOL/L — LOW (ref 135–145)
WBC # BLD: 8.26 K/UL — SIGNIFICANT CHANGE UP (ref 3.8–10.5)
WBC # FLD AUTO: 8.26 K/UL — SIGNIFICANT CHANGE UP (ref 3.8–10.5)

## 2023-06-18 PROCEDURE — 99232 SBSQ HOSP IP/OBS MODERATE 35: CPT

## 2023-06-18 RX ADMIN — HEPARIN SODIUM 7500 UNIT(S): 5000 INJECTION INTRAVENOUS; SUBCUTANEOUS at 22:29

## 2023-06-18 RX ADMIN — MIDODRINE HYDROCHLORIDE 5 MILLIGRAM(S): 2.5 TABLET ORAL at 13:46

## 2023-06-18 RX ADMIN — HEPARIN SODIUM 7500 UNIT(S): 5000 INJECTION INTRAVENOUS; SUBCUTANEOUS at 05:57

## 2023-06-18 RX ADMIN — Medication 2: at 12:06

## 2023-06-18 RX ADMIN — HEPARIN SODIUM 7500 UNIT(S): 5000 INJECTION INTRAVENOUS; SUBCUTANEOUS at 13:45

## 2023-06-18 RX ADMIN — PANTOPRAZOLE SODIUM 40 MILLIGRAM(S): 20 TABLET, DELAYED RELEASE ORAL at 05:57

## 2023-06-18 RX ADMIN — MIDODRINE HYDROCHLORIDE 5 MILLIGRAM(S): 2.5 TABLET ORAL at 05:57

## 2023-06-18 RX ADMIN — Medication 5 UNIT(S): at 12:06

## 2023-06-18 RX ADMIN — INSULIN GLARGINE 30 UNIT(S): 100 INJECTION, SOLUTION SUBCUTANEOUS at 07:56

## 2023-06-18 RX ADMIN — NYSTATIN CREAM 1 APPLICATION(S): 100000 CREAM TOPICAL at 18:08

## 2023-06-18 RX ADMIN — Medication 5 UNIT(S): at 16:43

## 2023-06-18 RX ADMIN — MIDODRINE HYDROCHLORIDE 5 MILLIGRAM(S): 2.5 TABLET ORAL at 22:29

## 2023-06-18 RX ADMIN — Medication 5 UNIT(S): at 07:49

## 2023-06-18 RX ADMIN — Medication 25 MILLIGRAM(S): at 05:57

## 2023-06-18 RX ADMIN — Medication 4: at 07:50

## 2023-06-18 RX ADMIN — ATORVASTATIN CALCIUM 80 MILLIGRAM(S): 80 TABLET, FILM COATED ORAL at 22:29

## 2023-06-18 RX ADMIN — Medication 75 MICROGRAM(S): at 05:57

## 2023-06-18 RX ADMIN — NYSTATIN CREAM 1 APPLICATION(S): 100000 CREAM TOPICAL at 05:56

## 2023-06-18 NOTE — PROGRESS NOTE ADULT - SUBJECTIVE AND OBJECTIVE BOX
Patient is a 72y old  Female who presents with a chief complaint of sepsis (04 Jun 2023 10:59)       HPI: female with history of insulin-dependent diabetes, CHF, hypertension who presented to the ED with nausea and vomiting for several days.  She has experienced no significant abdominal pain and history of multiple dark emesis and 1 episode of diarrhea today.  She denies fever or chills.  Denies URI symptoms.  She denies significant change in her urinary patters. CT performed on admission demonstrated mild left hydronephrosis without ureteral dilatation or obstructing calculus. Found to have DUNCAN and hypotension. Renal consulted for further eval. Has not seen a Nephrologist outpatient. Currently asymptomatic.      No acute events noted overnight, No N/V/SOB    PAST MEDICAL & SURGICAL HISTORY:  Hypertension      Diabetes      Lymphedema      H/O left bundle branch block      History of left bundle branch block (LBBB)      Systolic heart failure, chronic      H/O cataract  2020      History of surgical removal of meniscus of knee  left in 1971      Frozen shoulder  2000      H/O Achilles tendon repair  lengthened bilaterally, 2000      Fractured skull  1968         MEDICATIONS  (STANDING):  aspirin  chewable 81 milliGRAM(s) Oral daily  atorvastatin 80 milliGRAM(s) Oral at bedtime  cefTRIAXone   IVPB 2000 milliGRAM(s) IV Intermittent once  dextrose 5%. 1000 milliLiter(s) (50 mL/Hr) IV Continuous <Continuous>  dextrose 50% Injectable 50 milliLiter(s) IV Push every 15 minutes  dextrose 50% Injectable 25 milliLiter(s) IV Push every 15 minutes  heparin   Injectable 7500 Unit(s) SubCutaneous every 8 hours  insulin glargine Injectable (LANTUS) 25 Unit(s) SubCutaneous every morning  insulin lispro (ADMELOG) corrective regimen sliding scale   SubCutaneous at bedtime  insulin lispro (ADMELOG) corrective regimen sliding scale   SubCutaneous three times a day before meals  levothyroxine 75 MICROGram(s) Oral daily  metoprolol succinate ER 25 milliGRAM(s) Oral daily  midodrine      midodrine 5 milliGRAM(s) Oral every 8 hours  nystatin Powder 1 Application(s) Topical two times a day  pantoprazole    Tablet 40 milliGRAM(s) Oral before breakfast    MEDICATIONS  (PRN):  sodium chloride 0.9% lock flush 10 milliLiter(s) IV Push every 1 hour PRN Pre/post blood products, medications, blood draw, and to maintain line patency      FAMILY HISTORY:  Family history of CVA  mom, age 57    [place]    Social History:Non smoker        Allergies    penicillins (Hives)    Intolerances         REVIEW OF SYSTEMS:    as above    Vital Signs Last 24 Hrs  T(C): 36.7 (18 Jun 2023 05:25), Max: 36.7 (17 Jun 2023 21:55)  T(F): 98.1 (18 Jun 2023 05:25), Max: 98.1 (18 Jun 2023 05:25)  HR: 79 (18 Jun 2023 05:25) (72 - 79)  BP: 117/53 (18 Jun 2023 05:25) (100/61 - 117/53)  BP(mean): --  RR: 18 (18 Jun 2023 05:25) (18 - 18)  SpO2: 93% (18 Jun 2023 05:25) (93% - 96%)    Parameters below as of 18 Jun 2023 05:25  Patient On (Oxygen Delivery Method): room air        PHYSICAL EXAM:    GENERAL: NAD  HEAD:  Atraumatic, Normocephalic  EYES: EOMI, conjunctiva and sclera clear  ENMT: No Drainage from nares, No drainage from ears  NECK: Supple, +dialysis catheter intact  NERVOUS SYSTEM:  Awake and Alert  CHEST/LUNG: Clear to percussion bilaterally; No rales, rhonchi, wheezing, or rubs  HEART: Regular rate and rhythm; No murmurs, rubs, or gallops  ABDOMEN: Soft, Nontender, Nondistended; Bowel sounds present, obese  EXTREMITIES:  + Edema  SKIN: No rashes No obvious ecchymosis      LABS:                        8.6    8.26  )-----------( 262      ( 18 Jun 2023 06:18 )             27.7     06-18    133<L>  |  101  |  47<H>  ----------------------------<  243<H>  4.4   |  27  |  3.30<H>    Ca    7.7<L>      18 Jun 2023 06:18  Phos  4.2     06-17  Mg     2.1     06-18

## 2023-06-18 NOTE — PROGRESS NOTE ADULT - SUBJECTIVE AND OBJECTIVE BOX
Patient is a 72y old  Female who presents with a chief complaint of sepsis (17 Jun 2023 11:19)      INTERVAL HPI/OVERNIGHT EVENTS: Patient seen and examined. NAD. No complaints.    T(C): 36.7 (06-18-23 @ 14:00), Max: 36.7 (06-18-23 @ 05:25)  HR: 70 (06-18-23 @ 14:00) (69 - 79)  BP: 123/61 (06-18-23 @ 14:00) (104/61 - 123/61)  RR: 18 (06-18-23 @ 14:00) (17 - 18)  SpO2: 93% (06-18-23 @ 14:00) (93% - 95%)        MEDICATIONS  (STANDING):  atorvastatin 80 milliGRAM(s) Oral at bedtime  dextrose 5%. 1000 milliLiter(s) (50 mL/Hr) IV Continuous <Continuous>  dextrose 50% Injectable 50 milliLiter(s) IV Push every 15 minutes  dextrose 50% Injectable 25 milliLiter(s) IV Push every 15 minutes  epoetin mendoza-epbx (RETACRIT) Injectable 34598 Unit(s) IV Push <User Schedule>  heparin   Injectable 7500 Unit(s) SubCutaneous every 8 hours  insulin glargine Injectable (LANTUS) 30 Unit(s) SubCutaneous every morning  insulin lispro (ADMELOG) corrective regimen sliding scale   SubCutaneous three times a day before meals  insulin lispro (ADMELOG) corrective regimen sliding scale   SubCutaneous at bedtime  insulin lispro Injectable (ADMELOG) 5 Unit(s) SubCutaneous three times a day before meals  levothyroxine 75 MICROGram(s) Oral daily  metoprolol succinate ER 25 milliGRAM(s) Oral daily  midodrine 5 milliGRAM(s) Oral every 8 hours  midodrine      nystatin Powder 1 Application(s) Topical two times a day  pantoprazole    Tablet 40 milliGRAM(s) Oral before breakfast    MEDICATIONS  (PRN):  sodium chloride 0.9% lock flush 10 milliLiter(s) IV Push every 1 hour PRN Pre/post blood products, medications, blood draw, and to maintain line patency      PHYSICAL EXAM:  GENERAL: NAD  HEAD:  Atraumatic, Normocephalic  EYES: EOMI, PERRLA, conjunctiva and sclera clear  ENMT: No tonsillar erythema, exudates, or enlargement; Moist mucous membranes  NECK: Supple, No JVD  NERVOUS SYSTEM:  Awake & alert  CHEST/LUNG: Clear to auscultation bilaterally; No rales, rhonchi, wheezing,  HEART: Regular rate and rhythm  ABDOMEN: Soft, Nontender, Nondistended; Bowel sounds present  EXTREMITIES:  No clubbing, cyanosis, or edema  LYMPH: No lymphadenopathy noted  SKIN: No rashes                          8.6    8.26  )-----------( 262      ( 18 Jun 2023 06:18 )             27.7               133|101|47<243  4.4|27|3.30  7.7,2.1,--  06-18 @ 06:18    Advanced care planning discussed with patient/family [X] YES   [ ] NO    Advanced care planning discussed with patient/family. Patient's health status was discussed. All appropriate changes have been made regarding patient's end-of-life care. Advanced care planning forms reviewed/discussed/completed.  20 minutes spent.

## 2023-06-18 NOTE — PROGRESS NOTE ADULT - ASSESSMENT
Acute on CKD Stage 4  Sepsis/UTI  L Hydronephrosis  Uremia      -DUNCAN from sepsis/hypotension and renal hypoperfusion; unilateral mild hydro should not cause this degree of DUNCAN  -Abx, renal dosing  -Urology eval noted  -Monitor chemistries and urine output  -Renal indices previously worsened with uremic symptoms leading to dialysis  -Previously Consented for dialysis  -Discussed with patient risks and benefits of dialysis in depth up to and including death, she agreed to dialysis if necessary  -Initiated dialysis 6/6/23;  -Replaced Palomo 6/12/23 by IR  -Cleared by ID for PC  -D/W IR, planned for Monday for PC tentatively. However, Renal function now improving/stabilizing. Will hold off further HD. Will hold off PC plan for Monday. Monitor closely. Will need close outpatient follow up       Thank you

## 2023-06-18 NOTE — PROGRESS NOTE ADULT - PROBLEM SELECTOR PLAN 5
add zofran PRN  likely from uremia  improved
add zofran PRN  likely from uremia  improved / resolved
add zofran PRN
add zofran PRN  likely from uremia  improved / resolved
add zofran PRN  likely from uremia  improved / resolved
add zofran PRN  likely from uremia
add zofran PRN  likely from uremia  improved
add zofran PRN  likely from uremia  improved / resolved

## 2023-06-18 NOTE — PROGRESS NOTE ADULT - PROBLEM SELECTOR PLAN 2
urology eval with Dr. HSIEH appreciated  ? etiology  no urological intervention per URO  S/P hardy cath
urology eval with Dr. HSIEH appreciated  ? etiology  no urological intervention per URO  DC hardy soon
No intervention warranted at this time. Please reconsult as clinically indicated.
urology eval with Dr. HSIEH appreciated  ? etiology  no urological intervention per URO  DC hardy soon
urology eval with Dr. HSIEH appreciated  ? etiology  no urological intervention per URO  S/P hardy cath
urology eval with Dr. HSIEH appreciated  ? etiology  no urological intervention per URO
urology eval with Dr. HSIEH appreciated  ? etiology  no urological intervention per URO
urology eval with Dr. HSIEH  ? etiology
urology eval with Dr. HSIEH appreciated  ? etiology
urology eval with Dr. HSIEH appreciated  ? etiology  no urological intervention per URO  DC hardy soon
urology eval with Dr. HSIEH appreciated  ? etiology  no urological intervention per URO  S/P hardy cath

## 2023-06-18 NOTE — PROGRESS NOTE ADULT - ASSESSMENT
71yo F with PMHx IDDM2, non obstructive CAD,  HFrEF, HTN, hypothyroidism, LBBB, chronic lymphedema presenting with decreased appetite and multiple episodes of emesis, admitted in the ICU for septic shock 2/2 UTI.    Hypotension, Non Obs CAD, HFrEF, HTN, LBBB, Chr Lymphedema  - a/w septic shock requiring ICU level of care, on pressor (now weaned off), worsening renal indices on HD, now on GMF     - BP has been soft 110-120's, with sig drop 6/13 while working with PT 80-90's systolics,  + orthostatic   - BP remains soft 90- 120's, asymptomatic   - continue midodrine 5mg PO TID, can increase to 10 mg if necessary    - TTE: (06.06.23) LVSF mildly EF 38 %. Moderate aortic stenosis. Trace MR, TR, The inferior vena cava is normal measuring 1.86 cm in diameter  - Continue to hold home BB, ARNI 2/2 hypotension, on Midodrine   - Renal following, HD per renal for session today   - LE edema improving per patient        - known hx of non obstructive CAD from cardiac cath (7/2022), HFrEF   - EKG : SR 1st deg AVB LBBB, unchanged from prior read   - No evidence of any active ischemia   - Continue aspirin and Lipitor     - +UTI on abx, per primary   - Anemia, Heme/onc consulted  - Transfuse PRN to keep hgb>8 given cardiac hx.    - Monitor and replete Lytes. Keep K > 4 and Mg > 2  - Will continue to follow.    [facility]  Nurse Practitioner - Cardiology   Spectra #[phone]/ [phone]  call TEAMS       71yo F with PMHx IDDM2, non obstructive CAD,  HFrEF, HTN, hypothyroidism, LBBB, chronic lymphedema presenting with decreased appetite and multiple episodes of emesis, admitted in the ICU for septic shock 2/2 UTI.    Hypotension, Non Obs CAD, HFrEF, HTN, LBBB, Chr Lymphedema  - a/w septic shock requiring ICU level of care, on pressor (now weaned off), worsening renal indices on HD, now on GMF     - BP has been soft 100 -120's, with sig drop 6/13 while working with PT 80-90's systolics,  + orthostatic   - BP improving 90- 120's, asymptomatic   - continue midodrine 5mg PO TID, can increase to 10 mg if necessary    - TTE: (06.06.23) LVSF mildly EF 38 %. Moderate aortic stenosis. Trace MR, TR, The inferior vena cava is normal measuring 1.86 cm in diameter  - Continue to hold home BB, ARNI 2/2 hypotension, on Midodrine   - Renal following, HD for volume removal, creat ~3.3- 3.4    - LE edema improving per patient      - known hx of non obstructive CAD from cardiac cath (7/2022), HFrEF   - EKG : SR 1st deg AVB LBBB, unchanged from prior EKG  - No evidence of any active ischemia   - Continue aspirin and Lipitor     - Transfuse PRN to keep hgb>8 given cardiac hx.  - Monitor and replete Lytes. Keep K > 4 and Mg > 2  - Will continue to follow.    Azalea Murillo [facility]  Nurse Practitioner - Cardiology   Spectra #[phone]/ [phone]  call TEAMS

## 2023-06-18 NOTE — PROGRESS NOTE ADULT - PROBLEM SELECTOR PROBLEM 6
DUNCAN (acute kidney injury)

## 2023-06-18 NOTE — PROGRESS NOTE ADULT - PROBLEM SELECTOR PROBLEM 4
Shock, septic

## 2023-06-18 NOTE — PROGRESS NOTE ADULT - PROBLEM SELECTOR PLAN 4
S/p pressor support  improved / resolved
S/p pressor support  improved
pressor support  improving
S/p pressor support  improved / resolved
pressor support  improved
S/p pressor support  improved
S/p pressor support  improved / resolved

## 2023-06-18 NOTE — PROGRESS NOTE ADULT - PROBLEM SELECTOR PLAN 8
? etiology  check us to r/o DVT- neg for dvt
? etiology  check us to r/o DVT
? etiology  check us to r/o DVT- neg for dvt
? etiology  check us to r/o DVT- neg for dvt

## 2023-06-18 NOTE — PROGRESS NOTE ADULT - PROBLEM SELECTOR PLAN 3
IV ABX - rocephin per ID  ID eval with Dr. CARTAGENA appreciated  improving
IV ABX - rocephin per ID  ID eval with Dr. CARTAGENA appreciated  improving
S/P IV ABX - rocephin per ID  ID eval with Dr. CARTAGENA appreciated  improved  leukocytosis as noted- downtrending
S/P IV ABX - rocephin per ID  ID eval with Dr. CARTAGENA appreciated  improved  leukocytosis as noted- downtrending
IV ABX - rocephin per ID  ID eval with Dr. CARTAGENA appreciated  improving
IV ABX - rocephin per ID  ID eval with Dr. MITZI sultana
IV ABX - rocephin per ID  ID eval with Dr. MITZI sultana
IV ABX per ID  ID eval with Dr. MITZI sultana
IV ABX  ID eval with Dr. CARTAGENA
IV ABX - rocephin per ID  ID eval with Dr. CARTAGENA appreciated  improved
S/P IV ABX - rocephin per ID  ID eval with Dr. CARTAGENA appreciated  improved  leukocytosis as noted- downtrending
S/P IV ABX - rocephin per ID  ID eval with Dr. CARTAGENA appreciated  improved  leukocytosis as noted- downtrending
IV ABX - rocephin per ID  ID eval with Dr. CARTAGENA appreciated  improved  leukocytosis as noted- downtrending

## 2023-06-18 NOTE — PROGRESS NOTE ADULT - PROBLEM SELECTOR PROBLEM 2
Hydronephrosis, left

## 2023-06-18 NOTE — PROGRESS NOTE ADULT - SUBJECTIVE AND OBJECTIVE BOX
DOCUMENTATION IN PROGRESS      [facility] -- Howard Kimble Pannella, Patel, Savella, Goodger  Office # [phone]    Follow Up:  hypotension    Subjective/Observations:     REVIEW OF SYSTEMS: All other review of systems is negative unless indicated above  PAST MEDICAL & SURGICAL HISTORY:  Hypertension      Diabetes      Lymphedema      H/O left bundle branch block      History of left bundle branch block (LBBB)      Systolic heart failure, chronic      H/O cataract  2020      History of surgical removal of meniscus of knee  left in 1971      Frozen shoulder  2000      H/O Achilles tendon repair  lengthened bilaterally, 2000      Fractured skull  1968        MEDICATIONS  (STANDING):  atorvastatin 80 milliGRAM(s) Oral at bedtime  dextrose 5%. 1000 milliLiter(s) (50 mL/Hr) IV Continuous <Continuous>  dextrose 50% Injectable 50 milliLiter(s) IV Push every 15 minutes  dextrose 50% Injectable 25 milliLiter(s) IV Push every 15 minutes  epoetin mendoza-epbx (RETACRIT) Injectable 71157 Unit(s) IV Push <User Schedule>  heparin   Injectable 7500 Unit(s) SubCutaneous every 8 hours  insulin glargine Injectable (LANTUS) 30 Unit(s) SubCutaneous every morning  insulin lispro (ADMELOG) corrective regimen sliding scale   SubCutaneous three times a day before meals  insulin lispro (ADMELOG) corrective regimen sliding scale   SubCutaneous at bedtime  insulin lispro Injectable (ADMELOG) 5 Unit(s) SubCutaneous three times a day before meals  levothyroxine 75 MICROGram(s) Oral daily  metoprolol succinate ER 25 milliGRAM(s) Oral daily  midodrine 5 milliGRAM(s) Oral every 8 hours  midodrine      nystatin Powder 1 Application(s) Topical two times a day  pantoprazole    Tablet 40 milliGRAM(s) Oral before breakfast    MEDICATIONS  (PRN):  sodium chloride 0.9% lock flush 10 milliLiter(s) IV Push every 1 hour PRN Pre/post blood products, medications, blood draw, and to maintain line patency    Allergies    Ativan (Rash; Urticaria)  penicillins (Hives)    Intolerances      Vital Signs Last 24 Hrs  T(C): 36.7 (18 Jun 2023 05:25), Max: 36.7 (17 Jun 2023 21:55)  T(F): 98.1 (18 Jun 2023 05:25), Max: 98.1 (18 Jun 2023 05:25)  HR: 79 (18 Jun 2023 05:25) (72 - 79)  BP: 117/53 (18 Jun 2023 05:25) (100/61 - 117/53)  BP(mean): --  RR: 18 (18 Jun 2023 05:25) (18 - 18)  SpO2: 93% (18 Jun 2023 05:25) (93% - 96%)    Parameters below as of 18 Jun 2023 05:25  Patient On (Oxygen Delivery Method): room air      I&O's Summary      TELE:   PHYSICAL EXAM:  Constitutional: NAD, awake and alert, well-developed  HEENT: Moist Mucous Membranes, Anicteric  Pulmonary: Non-labored, breath sounds are clear bilaterally, No wheezing, rales or rhonchi  Cardiovascular: Regular, S1 and S2, No murmurs, rubs, gallops or clicks  Gastrointestinal: Bowel Sounds present, soft, nontender.   Lymph: No peripheral edema. No lymphadenopathy.  Skin: No visible rashes or ulcers.  Psych:  Mood & affect appropriate  LABS: All Labs Reviewed:                        8.6    8.26  )-----------( 262      ( 18 Jun 2023 06:18 )             27.7                         8.6    10.53 )-----------( 259      ( 17 Jun 2023 06:43 )             26.9                         9.0    12.00 )-----------( 230      ( 16 Jun 2023 15:30 )             27.5     18 Jun 2023 06:18    133    |  101    |  47     ----------------------------<  243    4.4     |  27     |  3.30   17 Jun 2023 06:43    135    |  101    |  42     ----------------------------<  222    4.9     |  29     |  3.30   16 Jun 2023 08:55    136    |  101    |  42     ----------------------------<  148    3.9     |  29     |  3.40     Ca    7.7        18 Jun 2023 06:18  Ca    7.9        17 Jun 2023 06:43  Ca    7.8        16 Jun 2023 08:55  Phos  4.2       17 Jun 2023 06:43  Mg     2.1       18 Jun 2023 06:18  Mg     2.2       17 Jun 2023 06:43            12 Lead ECG:   Ventricular Rate 83 BPM    Atrial Rate 83 BPM    P-R Interval 264 ms    QRS Duration 170 ms    Q-T Interval 446 ms    QTC Calculation(Bazett) 524 ms    P Axis 111 degrees    R Axis -61 degrees    T Axis 118 degrees    Diagnosis Line Sinus rhythm with 1st degree AV block  Left axis deviation  Left bundle branch block  Abnormal ECG  When compared with ECG of 05-JUN-2023 09:06,  No significant change was found  Confirmed by Leroy Hernández ([id number]) on 6/14/2023 7:21:13 AM (06-13-23 @ 13:34)      ACC: [id number] EXAM:  ECHO TTE WITH CON COMP W DOPP                          PROCEDURE DATE:  04/04/2022          INTERPRETATION:  INDICATION: Abnormal EKG  Sonographer AS    Blood Pressure 161/57    Height 180.3 cm     Weight 158.8 kg       BSA   2.8 sq m    Dimensions:  LA 4.2       Normal Values: 2.0 - 4.0 cm  Ao 3.3        Normal Values: 2.0 - 3.8 cm  SEPTUM 1.2       Normal Values: 0.6 - 1.2 cm  PWT 1.3       Normal Values: 0.6 - 1.1 cm  LVIDd 4.9         Normal Values: 3.0 - 5.6 cm  LVIDs 4.1         Normal Values: 1.8 - 4.0 cm      OBSERVATIONS:  Technically difficult and very limited study  Mitral Valve: Mitral annular calcification, trace physiologic MR.  Aortic Valve/Aorta: Aortic valve is not well-visualized. In limited views   the valve appears calcified with decreased opening. Peak transaortic   valve gradient is 26 mmHg with a mean transaortic valve gradient 15.4   mmHg.  Tricuspid Valve: Not well-visualized  Pulmonic Valve: Not well-visualized  Left Atrium: normal  Right Atrium: Not well-visualized  Left Ventricle: The left ventricular endocardium is not well-visualized.   Decreased global left ventricular systolic dysfunction with an LVEF of   35-40%. Endocardium visualization enhanced with intravenous injection of   ultrasonic enhancing agent (Definity)  Right Ventricle: Not well-visualized  Pericardium: no significant pericardial effusion.  Pulmonary/RV Pressure: estimated PA systolic pressure of 45 mmHg assuming   an RA pressure of 10 mmHg.  LV diastolic dysfunction is present        IMPRESSION:  Technically difficult and very limited study  The left ventricular endocardium is not well-visualized. Decreased global   left ventricular systolic dysfunction with an LVEF of 35-40%. Endocardium   visualization enhanced with intravenous injection of ultrasonic enhancing   agent (Definity)  The right ventricle is not well-visualized  The aortic valve is not well-visualized. In limited views the valve   appears calcified with decreased opening. Peak transaortic valve gradient   is 26 mmHg with a mean transaortic valve gradient 15.4 mmHg.  Trace physiologic MR  No significant pericardial effusion.    --- End of Report ---            LEROY HERNÁNDEZ MD; Attending Cardiologist  This document has been electronically signed. Apr 5 2022  5:17PM      [facility] -- Howard Kimble Pannella, Patel, Savella, Goodger  Office # [phone]    Follow Up:  hypotension    Subjective/Observations: seen and examined, awake, alert, eating breakfast in bed, denies chest pain, dyspnea, palpitations or dizziness, orthopnea and PND. Tolerating room air. ROXANE edgar intact     REVIEW OF SYSTEMS: All other review of systems is negative unless indicated above  PAST MEDICAL & SURGICAL HISTORY:  Hypertension      Diabetes      Lymphedema      H/O left bundle branch block      History of left bundle branch block (LBBB)      Systolic heart failure, chronic      H/O cataract  2020      History of surgical removal of meniscus of knee  left in 1971      Frozen shoulder  2000      H/O Achilles tendon repair  lengthened bilaterally, 2000      Fractured skull  1968        MEDICATIONS  (STANDING):  atorvastatin 80 milliGRAM(s) Oral at bedtime  dextrose 5%. 1000 milliLiter(s) (50 mL/Hr) IV Continuous <Continuous>  dextrose 50% Injectable 50 milliLiter(s) IV Push every 15 minutes  dextrose 50% Injectable 25 milliLiter(s) IV Push every 15 minutes  epoetin mnedoza-epbx (RETACRIT) Injectable 45719 Unit(s) IV Push <User Schedule>  heparin   Injectable 7500 Unit(s) SubCutaneous every 8 hours  insulin glargine Injectable (LANTUS) 30 Unit(s) SubCutaneous every morning  insulin lispro (ADMELOG) corrective regimen sliding scale   SubCutaneous three times a day before meals  insulin lispro (ADMELOG) corrective regimen sliding scale   SubCutaneous at bedtime  insulin lispro Injectable (ADMELOG) 5 Unit(s) SubCutaneous three times a day before meals  levothyroxine 75 MICROGram(s) Oral daily  metoprolol succinate ER 25 milliGRAM(s) Oral daily  midodrine 5 milliGRAM(s) Oral every 8 hours  midodrine      nystatin Powder 1 Application(s) Topical two times a day  pantoprazole    Tablet 40 milliGRAM(s) Oral before breakfast    MEDICATIONS  (PRN):  sodium chloride 0.9% lock flush 10 milliLiter(s) IV Push every 1 hour PRN Pre/post blood products, medications, blood draw, and to maintain line patency    Allergies    Ativan (Rash; Urticaria)  penicillins (Hives)    Intolerances      Vital Signs Last 24 Hrs  T(C): 36.7 (18 Jun 2023 05:25), Max: 36.7 (17 Jun 2023 21:55)  T(F): 98.1 (18 Jun 2023 05:25), Max: 98.1 (18 Jun 2023 05:25)  HR: 79 (18 Jun 2023 05:25) (72 - 79)  BP: 117/53 (18 Jun 2023 05:25) (100/61 - 117/53)  BP(mean): --  RR: 18 (18 Jun 2023 05:25) (18 - 18)  SpO2: 93% (18 Jun 2023 05:25) (93% - 96%)    Parameters below as of 18 Jun 2023 05:25  Patient On (Oxygen Delivery Method): room air      I&O's Summary  TELE: Not on telemetry   PHYSICAL EXAM:  Constitutional: NAD, awake and alert,obese  HEENT: Moist Mucous Membranes, Anicteric  Pulmonary: Non-labored, breath sounds are clear, dim at bases bilaterally, No wheezing, rales or rhonchi  Cardiovascular: Regular, S1 and S2, + murmurs, rubs, gallops or clicks  Gastrointestinal: Bowel Sounds present, soft, nontender.   Lymph: +3peripheral edema. No lymphadenopathy.  Skin: No visible rashes or ulcers.+ Rt IJ HD cath   Psych:  Mood & affect appropriate    LABS: All Labs Reviewed:                        8.6    8.26  )-----------( 262      ( 18 Jun 2023 06:18 )             27.7                         8.6    10.53 )-----------( 259      ( 17 Jun 2023 06:43 )             26.9                         9.0    12.00 )-----------( 230      ( 16 Jun 2023 15:30 )             27.5     18 Jun 2023 06:18    133    |  101    |  47     ----------------------------<  243    4.4     |  27     |  3.30   17 Jun 2023 06:43    135    |  101    |  42     ----------------------------<  222    4.9     |  29     |  3.30   16 Jun 2023 08:55    136    |  101    |  42     ----------------------------<  148    3.9     |  29     |  3.40     Ca    7.7        18 Jun 2023 06:18  Ca    7.9        17 Jun 2023 06:43  Ca    7.8        16 Jun 2023 08:55  Phos  4.2       17 Jun 2023 06:43  Mg     2.1       18 Jun 2023 06:18  Mg     2.2       17 Jun 2023 06:43            12 Lead ECG:   Ventricular Rate 83 BPM    Atrial Rate 83 BPM    P-R Interval 264 ms    QRS Duration 170 ms    Q-T Interval 446 ms    QTC Calculation(Bazett) 524 ms    P Axis 111 degrees    R Axis -61 degrees    T Axis 118 degrees    Diagnosis Line Sinus rhythm with 1st degree AV block  Left axis deviation  Left bundle branch block  Abnormal ECG  When compared with ECG of 05-JUN-2023 09:06,  No significant change was found  Confirmed by Skylar Hernández ([id number]) on 6/14/2023 7:21:13 AM (06-13-23 @ 13:34)      ACC: [id number] EXAM:  ECHO TTE WITH CON COMP W DOPP                          PROCEDURE DATE:  04/04/2022          INTERPRETATION:  INDICATION: Abnormal EKG  Sonographer AS    Blood Pressure 161/57    Height 180.3 cm     Weight 158.8 kg       BSA   2.8 sq m    Dimensions:  LA 4.2       Normal Values: 2.0 - 4.0 cm  Ao 3.3        Normal Values: 2.0 - 3.8 cm  SEPTUM 1.2       Normal Values: 0.6 - 1.2 cm  PWT 1.3       Normal Values: 0.6 - 1.1 cm  LVIDd 4.9         Normal Values: 3.0 - 5.6 cm  LVIDs 4.1         Normal Values: 1.8 - 4.0 cm      OBSERVATIONS:  Technically difficult and very limited study  Mitral Valve: Mitral annular calcification, trace physiologic MR.  Aortic Valve/Aorta: Aortic valve is not well-visualized. In limited views   the valve appears calcified with decreased opening. Peak transaortic   valve gradient is 26 mmHg with a mean transaortic valve gradient 15.4   mmHg.  Tricuspid Valve: Not well-visualized  Pulmonic Valve: Not well-visualized  Left Atrium: normal  Right Atrium: Not well-visualized  Left Ventricle: The left ventricular endocardium is not well-visualized.   Decreased global left ventricular systolic dysfunction with an LVEF of   35-40%. Endocardium visualization enhanced with intravenous injection of   ultrasonic enhancing agent (Definity)  Right Ventricle: Not well-visualized  Pericardium: no significant pericardial effusion.  Pulmonary/RV Pressure: estimated PA systolic pressure of 45 mmHg assuming   an RA pressure of 10 mmHg.  LV diastolic dysfunction is present        IMPRESSION:  Technically difficult and very limited study  The left ventricular endocardium is not well-visualized. Decreased global   left ventricular systolic dysfunction with an LVEF of 35-40%. Endocardium   visualization enhanced with intravenous injection of ultrasonic enhancing   agent (Definity)  The right ventricle is not well-visualized  The aortic valve is not well-visualized. In limited views the valve   appears calcified with decreased opening. Peak transaortic valve gradient   is 26 mmHg with a mean transaortic valve gradient 15.4 mmHg.  Trace physiologic MR  No significant pericardial effusion.    --- End of Report ---            SKYLAR HERNÁNDEZ MD; Attending Cardiologist  This document has been electronically signed. Apr 5 2022  5:17PM

## 2023-06-18 NOTE — PROGRESS NOTE ADULT - PROBLEM SELECTOR PLAN 7
? etiology  doubt active infection  heme eval with Dr. michael appreciated  downtrending- follow cbc
? etiology  doubt active infection  heme eval with Dr. michael appreciated  downtrending- follow cbc
? etiology  doubt active infection  heme eval
? etiology  doubt active infection  heme eval
? etiology  doubt active infection  heme eval  downtrending- follow cbc
? etiology  doubt active infection  heme eval with Dr. michael  downtrending- follow cbc
? etiology  doubt active infection  heme eval with Dr. michael appreciated  downtrending- follow cbc

## 2023-06-18 NOTE — PROGRESS NOTE ADULT - PROBLEM SELECTOR PLAN 6
multifactorial  HD per renal  renal eval
multifactorial  HD per renal
multifactorial  HD per renal  renal eval with Dr. elizabeth rodgersl
multifactorial  HD per renal  renal eval
multifactorial  HD per renal  renal eval with Dr. johnson
multifactorial  HD per renal  possible permacath on Monday  renal eval with Dr. elizabeth schmid
multifactorial  HD per renal  renal eval
multifactorial  HD per renal  possible permacath on Monday  renal eval with Dr. elizabeth schmid

## 2023-06-18 NOTE — PROGRESS NOTE ADULT - NUTRITIONAL ASSESSMENT
This patient has been assessed with a concern for Malnutrition and has been determined to have a diagnosis/diagnoses of Morbid obesity (BMI > 40).    This patient is being managed with:   Diet Consistent Carbohydrate w/Evening Snack-  1500mL Fluid Restriction ([id number])  Entered: Frederic 15 2023  4:58PM  
This patient has been assessed with a concern for Malnutrition and has been determined to have a diagnosis/diagnoses of Morbid obesity (BMI > 40).    This patient is being managed with:   Diet Consistent Carbohydrate w/Evening Snack-  1500mL Fluid Restriction ([id number])  Entered: Frederic 15 2023  4:58PM  
This patient has been assessed with a concern for Malnutrition and has been determined to have a diagnosis/diagnoses of Morbid obesity (BMI > 40).    This patient is being managed with:   Diet Consistent Carbohydrate w/Evening Snack-  1500mL Fluid Restriction ([id number])  Entered: Frederic 15 2023  4:58PM  
This patient has been assessed with a concern for Malnutrition and has been determined to have a diagnosis/diagnoses of Morbid obesity (BMI > 40).    This patient is being managed with:   Diet Consistent Carbohydrate Renal/No Snacks-  1500mL Fluid Restriction ([id number])  Entered: Jun 12 2023 11:14AM  
This patient has been assessed with a concern for Malnutrition and has been determined to have a diagnosis/diagnoses of Morbid obesity (BMI > 40).    This patient is being managed with:   Diet Consistent Carbohydrate Renal/No Snacks-  1500mL Fluid Restriction ([id number])  Entered: Jun 12 2023 11:14AM  
This patient has been assessed with a concern for Malnutrition and has been determined to have a diagnosis/diagnoses of Morbid obesity (BMI > 40).    This patient is being managed with:   Diet Consistent Carbohydrate/No Snacks-  For patients receiving Renal Replacement - No Protein Restr No Conc K No Conc Phos Low Sodium (RENAL)  Entered: Jun 8 2023 12:34PM    The following pending diet order is being considered for treatment of Morbid obesity (BMI > 40):  Diet Consistent Carbohydrate Renal/No Snacks-  1500mL Fluid Restriction ([id number])  Entered: Jun 12 2023 11:14AM

## 2023-06-19 ENCOUNTER — TRANSCRIPTION ENCOUNTER (OUTPATIENT)
Age: 73
End: 2023-06-19

## 2023-06-19 VITALS
SYSTOLIC BLOOD PRESSURE: 114 MMHG | TEMPERATURE: 97 F | RESPIRATION RATE: 18 BRPM | DIASTOLIC BLOOD PRESSURE: 66 MMHG | HEART RATE: 67 BPM | OXYGEN SATURATION: 96 %

## 2023-06-19 LAB
ANION GAP SERPL CALC-SCNC: 7 MMOL/L — SIGNIFICANT CHANGE UP (ref 5–17)
ANION GAP SERPL CALC-SCNC: 8 MMOL/L — SIGNIFICANT CHANGE UP (ref 5–17)
BUN SERPL-MCNC: 44 MG/DL — HIGH (ref 7–23)
BUN SERPL-MCNC: 45 MG/DL — HIGH (ref 7–23)
CALCIUM SERPL-MCNC: 7.8 MG/DL — LOW (ref 8.5–10.1)
CALCIUM SERPL-MCNC: 8.1 MG/DL — LOW (ref 8.5–10.1)
CHLORIDE SERPL-SCNC: 102 MMOL/L — SIGNIFICANT CHANGE UP (ref 96–108)
CHLORIDE SERPL-SCNC: 102 MMOL/L — SIGNIFICANT CHANGE UP (ref 96–108)
CO2 SERPL-SCNC: 25 MMOL/L — SIGNIFICANT CHANGE UP (ref 22–31)
CO2 SERPL-SCNC: 28 MMOL/L — SIGNIFICANT CHANGE UP (ref 22–31)
CREAT SERPL-MCNC: 3.2 MG/DL — HIGH (ref 0.5–1.3)
CREAT SERPL-MCNC: 3.2 MG/DL — HIGH (ref 0.5–1.3)
EGFR: 15 ML/MIN/1.73M2 — LOW
EGFR: 15 ML/MIN/1.73M2 — LOW
GLUCOSE SERPL-MCNC: 155 MG/DL — HIGH (ref 70–99)
GLUCOSE SERPL-MCNC: 174 MG/DL — HIGH (ref 70–99)
HCT VFR BLD CALC: 28 % — LOW (ref 34.5–45)
HGB BLD-MCNC: 8.7 G/DL — LOW (ref 11.5–15.5)
MAGNESIUM SERPL-MCNC: 2.2 MG/DL — SIGNIFICANT CHANGE UP (ref 1.6–2.6)
MCHC RBC-ENTMCNC: 29.3 PG — SIGNIFICANT CHANGE UP (ref 27–34)
MCHC RBC-ENTMCNC: 31.1 GM/DL — LOW (ref 32–36)
MCV RBC AUTO: 94.3 FL — SIGNIFICANT CHANGE UP (ref 80–100)
NRBC # BLD: 0 /100 WBCS — SIGNIFICANT CHANGE UP (ref 0–0)
PHOSPHATE SERPL-MCNC: 4.2 MG/DL — SIGNIFICANT CHANGE UP (ref 2.5–4.5)
PLATELET # BLD AUTO: 288 K/UL — SIGNIFICANT CHANGE UP (ref 150–400)
POTASSIUM SERPL-MCNC: 4.2 MMOL/L — SIGNIFICANT CHANGE UP (ref 3.5–5.3)
POTASSIUM SERPL-MCNC: 4.3 MMOL/L — SIGNIFICANT CHANGE UP (ref 3.5–5.3)
POTASSIUM SERPL-SCNC: 4.2 MMOL/L — SIGNIFICANT CHANGE UP (ref 3.5–5.3)
POTASSIUM SERPL-SCNC: 4.3 MMOL/L — SIGNIFICANT CHANGE UP (ref 3.5–5.3)
RBC # BLD: 2.97 M/UL — LOW (ref 3.8–5.2)
RBC # FLD: 14.9 % — HIGH (ref 10.3–14.5)
SODIUM SERPL-SCNC: 135 MMOL/L — SIGNIFICANT CHANGE UP (ref 135–145)
SODIUM SERPL-SCNC: 137 MMOL/L — SIGNIFICANT CHANGE UP (ref 135–145)
WBC # BLD: 9.01 K/UL — SIGNIFICANT CHANGE UP (ref 3.8–10.5)
WBC # FLD AUTO: 9.01 K/UL — SIGNIFICANT CHANGE UP (ref 3.8–10.5)

## 2023-06-19 PROCEDURE — 83735 ASSAY OF MAGNESIUM: CPT

## 2023-06-19 PROCEDURE — 87640 STAPH A DNA AMP PROBE: CPT

## 2023-06-19 PROCEDURE — 82009 KETONE BODYS QUAL: CPT

## 2023-06-19 PROCEDURE — 86706 HEP B SURFACE ANTIBODY: CPT

## 2023-06-19 PROCEDURE — 87641 MR-STAPH DNA AMP PROBE: CPT

## 2023-06-19 PROCEDURE — 83605 ASSAY OF LACTIC ACID: CPT

## 2023-06-19 PROCEDURE — 87086 URINE CULTURE/COLONY COUNT: CPT

## 2023-06-19 PROCEDURE — 36415 COLL VENOUS BLD VENIPUNCTURE: CPT

## 2023-06-19 PROCEDURE — C1752: CPT

## 2023-06-19 PROCEDURE — 93306 TTE W/DOPPLER COMPLETE: CPT

## 2023-06-19 PROCEDURE — C1769: CPT

## 2023-06-19 PROCEDURE — 97165 OT EVAL LOW COMPLEX 30 MIN: CPT

## 2023-06-19 PROCEDURE — 93005 ELECTROCARDIOGRAM TRACING: CPT

## 2023-06-19 PROCEDURE — 81001 URINALYSIS AUTO W/SCOPE: CPT

## 2023-06-19 PROCEDURE — 85730 THROMBOPLASTIN TIME PARTIAL: CPT

## 2023-06-19 PROCEDURE — 96375 TX/PRO/DX INJ NEW DRUG ADDON: CPT

## 2023-06-19 PROCEDURE — 84484 ASSAY OF TROPONIN QUANT: CPT

## 2023-06-19 PROCEDURE — 84443 ASSAY THYROID STIM HORMONE: CPT

## 2023-06-19 PROCEDURE — 76937 US GUIDE VASCULAR ACCESS: CPT

## 2023-06-19 PROCEDURE — 74176 CT ABD & PELVIS W/O CONTRAST: CPT | Mod: MA

## 2023-06-19 PROCEDURE — 97116 GAIT TRAINING THERAPY: CPT

## 2023-06-19 PROCEDURE — 84100 ASSAY OF PHOSPHORUS: CPT

## 2023-06-19 PROCEDURE — 77001 FLUOROGUIDE FOR VEIN DEVICE: CPT

## 2023-06-19 PROCEDURE — 87040 BLOOD CULTURE FOR BACTERIA: CPT

## 2023-06-19 PROCEDURE — 87150 DNA/RNA AMPLIFIED PROBE: CPT

## 2023-06-19 PROCEDURE — 87186 SC STD MICRODIL/AGAR DIL: CPT

## 2023-06-19 PROCEDURE — 71045 X-RAY EXAM CHEST 1 VIEW: CPT

## 2023-06-19 PROCEDURE — 97535 SELF CARE MNGMENT TRAINING: CPT

## 2023-06-19 PROCEDURE — 99261: CPT

## 2023-06-19 PROCEDURE — 99232 SBSQ HOSP IP/OBS MODERATE 35: CPT

## 2023-06-19 PROCEDURE — 80074 ACUTE HEPATITIS PANEL: CPT

## 2023-06-19 PROCEDURE — 96374 THER/PROPH/DIAG INJ IV PUSH: CPT

## 2023-06-19 PROCEDURE — 97530 THERAPEUTIC ACTIVITIES: CPT

## 2023-06-19 PROCEDURE — C1894: CPT

## 2023-06-19 PROCEDURE — 97162 PT EVAL MOD COMPLEX 30 MIN: CPT

## 2023-06-19 PROCEDURE — 99285 EMERGENCY DEPT VISIT HI MDM: CPT | Mod: 25

## 2023-06-19 PROCEDURE — P9047: CPT

## 2023-06-19 PROCEDURE — 85027 COMPLETE CBC AUTOMATED: CPT

## 2023-06-19 PROCEDURE — 83036 HEMOGLOBIN GLYCOSYLATED A1C: CPT

## 2023-06-19 PROCEDURE — 80053 COMPREHEN METABOLIC PANEL: CPT

## 2023-06-19 PROCEDURE — 82962 GLUCOSE BLOOD TEST: CPT

## 2023-06-19 PROCEDURE — 83690 ASSAY OF LIPASE: CPT

## 2023-06-19 PROCEDURE — 80048 BASIC METABOLIC PNL TOTAL CA: CPT

## 2023-06-19 PROCEDURE — 85610 PROTHROMBIN TIME: CPT

## 2023-06-19 PROCEDURE — 85025 COMPLETE CBC W/AUTO DIFF WBC: CPT

## 2023-06-19 PROCEDURE — 82272 OCCULT BLD FECES 1-3 TESTS: CPT

## 2023-06-19 PROCEDURE — 36580 REPLACE CVAD CATH: CPT

## 2023-06-19 PROCEDURE — 97110 THERAPEUTIC EXERCISES: CPT

## 2023-06-19 PROCEDURE — 93970 EXTREMITY STUDY: CPT

## 2023-06-19 RX ORDER — SACUBITRIL AND VALSARTAN 24; 26 MG/1; MG/1
1 TABLET, FILM COATED ORAL
Qty: 0 | Refills: 0 | DISCHARGE

## 2023-06-19 RX ADMIN — Medication 5 UNIT(S): at 08:35

## 2023-06-19 RX ADMIN — Medication 2: at 12:40

## 2023-06-19 RX ADMIN — INSULIN GLARGINE 30 UNIT(S): 100 INJECTION, SOLUTION SUBCUTANEOUS at 12:39

## 2023-06-19 RX ADMIN — Medication 5 UNIT(S): at 12:40

## 2023-06-19 RX ADMIN — Medication 75 MICROGRAM(S): at 05:22

## 2023-06-19 RX ADMIN — PANTOPRAZOLE SODIUM 40 MILLIGRAM(S): 20 TABLET, DELAYED RELEASE ORAL at 05:22

## 2023-06-19 RX ADMIN — NYSTATIN CREAM 1 APPLICATION(S): 100000 CREAM TOPICAL at 05:24

## 2023-06-19 RX ADMIN — MIDODRINE HYDROCHLORIDE 5 MILLIGRAM(S): 2.5 TABLET ORAL at 05:22

## 2023-06-19 RX ADMIN — Medication 25 MILLIGRAM(S): at 05:22

## 2023-06-19 NOTE — PROGRESS NOTE ADULT - ASSESSMENT
Acute on CKD Stage 4  Sepsis/UTI  L Hydronephrosis  Uremia      -DUNCAN from sepsis/hypotension and renal hypoperfusion; unilateral mild hydro should not cause this degree of DUNCAN  -Abx, renal dosing  -Urology eval noted  -Monitor chemistries and urine output  -Renal indices previously worsened with uremic symptoms leading to dialysis  -Previously Consented for dialysis  -Discussed with patient risks and benefits of dialysis in depth up to and including death, she agreed to dialysis if necessary  -Initiated dialysis 6/6/23;  -Replaced Palomo 6/12/23 by IR  -Cleared by ID for PC  -Renal Function improving, No further dialysis needed at this time.  Temp catheter removed.     No renal objection to DC, discussed that she will need outpatient follow up and she understands and agrees  d/w primary      Thank you

## 2023-06-19 NOTE — PROGRESS NOTE ADULT - REASON FOR ADMISSION
sepsis

## 2023-06-19 NOTE — PROGRESS NOTE ADULT - NS ATTEND AMEND GEN_ALL_CORE FT
I have personally seen and examined the patient in detail.  I have spoken to the provider regarding the assessment and plan of care.  I have made changes to the note accordingly.
check orthostatics. midodrine and titrate up as needed. Appears compensated from HF POV. Further cardiac workup will depend on clinical course.
Patient remains with borderline BP.  On midodrine  Continue to hold diuretics, BB, and ARNI.  To resume when able.  Continue HD per renal.  To follow while admitted.
was orthostatics. cont midodrine. check orthostatics.   sig lower ext edema improving with hd. cont vol removal as tolerated with hd.   Further cardiac workup will depend on clinical course.
Holding GDMT for hypotension.  Continue midodrine.  Renal follow up. Hopefully to slowly resume as bp becomes more stable.
cont midodrine. vol removal with hd. Further cardiac workup will depend on clinical course.

## 2023-06-19 NOTE — DISCHARGE NOTE NURSING/CASE MANAGEMENT/SOCIAL WORK - PATIENT PORTAL LINK FT
You can access the FollowMyHealth Patient Portal offered by Health system by registering at the following website: [email or web address]. By joining [facility]’s FollowMyHealth portal, you will also be able to view your health information using other applications (apps) compatible with our system.

## 2023-06-19 NOTE — PROGRESS NOTE ADULT - SUBJECTIVE AND OBJECTIVE BOX
EVELYN LOPEZ is a 72yFemale , patient examined and chart reviewed.    INTERVAL HPI/ OVERNIGHT EVENTS:   NAD. Afebrile In chair.  Feeling well. No events.    PAST MEDICAL & SURGICAL HISTORY:  Hypertension  Diabetes  Lymphedema  H/O left bundle branch block  History of left bundle branch block (LBBB)  Systolic heart failure, chronic  H/O cataract  2020  History of surgical removal of meniscus of knee  left in 1971  Frozen shoulder  2000  H/O Achilles tendon repair  lengthened bilaterally, 2000  Fractured skull  1968      For details regarding the patient's social history, family history, and other miscellaneous elements, please refer the initial infectious diseases consultation and/or the admitting history and physical examination for this admission.     ROS:  CONSTITUTIONAL:  Negative fever or chills  EYES:  Negative  blurry vision or double vision  CARDIOVASCULAR:  Negative for chest pain or palpitations  RESPIRATORY:  Negative for cough, wheezing, or SOB   GASTROINTESTINAL:  Negative for nausea vomiting, diarrhea, constipation, or abdominal pain   GENITOURINARY:  Negative frequency, urgency or dysuria  NEUROLOGIC:  No headache, confusion, dizziness, lightheadedness  All other systems were reviewed and are negative     ALLERGIES  penicillins (Hives)      Current inpatient medications :    ANTIBIOTICS/RELEVANT:    MEDICATIONS  (STANDING):  atorvastatin 80 milliGRAM(s) Oral at bedtime  dextrose 5%. 1000 milliLiter(s) (50 mL/Hr) IV Continuous <Continuous>  dextrose 50% Injectable 50 milliLiter(s) IV Push every 15 minutes  dextrose 50% Injectable 25 milliLiter(s) IV Push every 15 minutes  epoetin mendoza-epbx (RETACRIT) Injectable 11387 Unit(s) IV Push <User Schedule>  heparin   Injectable 7500 Unit(s) SubCutaneous every 8 hours  insulin glargine Injectable (LANTUS) 30 Unit(s) SubCutaneous every morning  insulin lispro (ADMELOG) corrective regimen sliding scale   SubCutaneous three times a day before meals  insulin lispro (ADMELOG) corrective regimen sliding scale   SubCutaneous at bedtime  insulin lispro Injectable (ADMELOG) 5 Unit(s) SubCutaneous three times a day before meals  levothyroxine 75 MICROGram(s) Oral daily  metoprolol succinate ER 25 milliGRAM(s) Oral daily  nystatin Powder 1 Application(s) Topical two times a day  pantoprazole    Tablet 40 milliGRAM(s) Oral before breakfast    MEDICATIONS  (PRN):  sodium chloride 0.9% lock flush 10 milliLiter(s) IV Push every 1 hour PRN Pre/post blood products, medications, blood draw, and to maintain line patency      Objective:  Vital Signs Last 24 Hrs  T(C): 36.3 (19 Jun 2023 13:00), Max: 36.9 (18 Jun 2023 20:38)  T(F): 97.4 (19 Jun 2023 13:00), Max: 98.4 (18 Jun 2023 20:38)  HR: 67 (19 Jun 2023 13:00) (67 - 70)  BP: 114/66 (19 Jun 2023 13:00) (107/64 - 125/67)  RR: 18 (19 Jun 2023 13:00) (18 - 19)  SpO2: 96% (19 Jun 2023 13:00) (94% - 96%)    Parameters below as of 19 Jun 2023 13:00  Patient On (Oxygen Delivery Method): room air      Physical Exam:  General: no acute distress  Neck: supple, trachea midline right IJ HD cath c/d/i  Lungs: clear, no wheeze/rhonchi  Cardiovascular: regular rate and rhythm, S1 S2  Abdomen: soft, nontender,  bowel sounds normal  Neurological: alert and oriented x3  Skin: no rash  Extremities: + edema      LABS:                        8.7    9.01  )-----------( 288      ( 19 Jun 2023 09:38 )             28.0   06-19    135  |  102  |  44<H>  ----------------------------<  155<H>  4.3   |  25  |  3.20<H>    Ca    7.8<L>      19 Jun 2023 11:33  Phos  4.2     06-19  Mg     2.2     06-19    MICROBIOLOGY:  Culture - Blood (collected 11 Jun 2023 14:25)  Source: .Blood Blood  Preliminary Report (12 Jun 2023 20:02):    No growth to date.    Culture - Blood (collected 11 Jun 2023 12:40)  Source: .Blood Blood  Preliminary Report (12 Jun 2023 20:02):    No growth to date.      RADIOLOGY & ADDITIONAL STUDIES:    ACC: [id number] EXAM:  CT ABDOMEN AND PELVIS OC   ORDERED BY:  PEDRO LUIS MARTIN     PROCEDURE DATE:  06/04/2023          INTERPRETATION:  CLINICAL INFORMATION: Vomiting and abdominal pain.    COMPARISON: 4/20/2023 CT abdomen pelvis    CONTRAST/COMPLICATIONS:  IV Contrast: NONE  Oral Contrast: Omnipaque 300  Complications: None reported at time of study completion    PROCEDURE:  CT of the Abdomen and Pelvis was performed.  Sagittal and coronal reformats were performed.    FINDINGS:  LOWER CHEST: Within normal limits.    LIVER: Within normal limits.  BILE DUCTS: Normal caliber.  GALLBLADDER: Within normal limits.  SPLEEN: Within normal limits.  PANCREAS: Within normal limits.  ADRENALS: Within normal limits.  KIDNEYS/URETERS: Mild left hydronephrosis but with no stone or other   obstructing lesion identified. Suggestion of left renal pelvis and   ureteral wall thickening not well evaluated without IV contrast. Kidneys   otherwise similar to prior with mild bilateral perinephric stranding. No   concerning renal mass.    BLADDER: Nondilated. No stones or filling defects.  REPRODUCTIVE ORGANS: Small calcification in the uterus. No concerning   findings.    BOWEL: No bowel obstruction. Appendix is normal.  PERITONEUM: No ascites.  VESSELS:No abdominal aortic aneurysm. Atherosclerosis similar to prior.  RETROPERITONEUM/LYMPH NODES: No lymphadenopathy.  ABDOMINAL WALL: Within normal limits.  BONES: No acute findings. Bilateral L5 spondylolysis with mild   degenerative anterolisthesis ofL5 on S1. Prominent degenerative changes   lower lumbar spine.    IMPRESSION:  Mild left hydronephrosis is new. Left ureter not dilated. Suggestion of   left renal pelvis wall thickening not well evaluated without IV contrast.   Urinary tract infection could appear this way though not definitive.   Early or mild proximal left ureteral obstruction also possible though no   stone or other obstructing lesion is evident.    No other significant change. No bowel obstruction.    Assessment :   73yo F with PMHx IDDM2, HFrEF, HTN, hypothyroidism, LBBB, chronic lymphedema admitted with septic shock and Ecoli bacteremia sec Left pyelo with hydro- no obtructive uropathy on CT    Off pressors  Worsening DUNCAN started on HD 6/6  Rising WBC Afebrile Unclear etiology -Repeat blood cultures 6/11 ngtd  WBC downtrending- normalised  Renal follow up noted- to hold off on HD and PC as renal fx improving  Clinically stable and improving    Plan :   Monitor off antibiotics  Sp Rocephin x 10 days ended  6/14  Repeat blood cultures- NGTD  Ok from ID standpoint for permanent HD cath - if indicated  Trend temps and cbc  Pulm toileting  Increase activity/OOB to chair  Stable from ID standpoint  Dc planning     D/w pt Questions answered.      Continue with present regiment.  Appropriate use of antibiotics and adverse effects reviewed.      > 35 minutes were spent in direct patient care reviewing notes, medications ,labs data/ imaging , discussion with multidisciplinary team.    Thank you for allowing me to participate in care of your patient .    Robby Clark MD  Infectious Disease  [phone]

## 2023-06-19 NOTE — PROGRESS NOTE ADULT - PROBLEM SELECTOR PROBLEM 1
Acute UTI
Type 1 diabetes
Type 1 diabetes
Acute UTI

## 2023-06-19 NOTE — CHART NOTE - NSCHARTNOTEFT_GEN_A_CORE
Do you have Advance Directives (HCP / LV / Organ donation / Documentation of oral advance Directive):   ( x   )  yes    (      )    NO                                                                            Do you have LV - Living will :                                                                                                                                             (  x  )  yes    (      )   No    Do you have HCP - Health Care Proxy:                                                                                                                            (   x  )  yes   (       ) N0    Do you have DNR- Do Not Resuscitate :                                                                                                                           (      )  yes  (     x   )  No    Do you have DNI- Do Not intubate  :                                                                                                                               (      )  yes   (    x   ) No    Do you have MOLST - Medical orders for Life sustaining treatment  :                                                                    (      ) yes    (   x    ) No    Decision Maker :  (   x  ) Patient     (      )  [facility]   (     ) Public Health Law Surrogate     (      ) Surrogate  (       ) Guardian    Goals of Care :  (    x  )   Complete Care     (       ) No Limitations                              (       )   Comfort Care       (       )  Hospice                               (      )   Limited medical Intervention / s    Medical Interventions :   (   x     )   CPR       (        )  DNR                                               (   x     )  Intubation with MV - Mechanical Ventilation  (  x    ) BIPAP/CPAP    (         )   DNI                                               (     x    )  Artificial Nutrition -  IVF, TPN / PPN, Tube Feeds             (         )   No Feeding Tube                                                (     x   ) Use Antibiotics                         (          ) No Antibiotics                                                (     x    ) Blood and Blood Products     (         )   No Blood or Blood products                                                (      x    )  Dialysis                                    (         )  No Dialysis                                                (          )  Medical Management only  (         )  No Invasive Interventions or Surgery  Time spent :                        (    x   ) upto 30 minutes                       (           )   more than 30 minutes  ACP reviewed and discussed

## 2023-06-19 NOTE — PROGRESS NOTE ADULT - SUBJECTIVE AND OBJECTIVE BOX
DOCUMENTATION IN PROGRESS        [facility] -- Howard Kimble Pannella, Patel, Savella, Goodger  Office # [phone]    Follow Up:   hypotension    Subjective/Observations:    REVIEW OF SYSTEMS: All other review of systems is negative unless indicated above  PAST MEDICAL & SURGICAL HISTORY:  Hypertension      Diabetes      Lymphedema      H/O left bundle branch block      History of left bundle branch block (LBBB)      Systolic heart failure, chronic      H/O cataract  2020      History of surgical removal of meniscus of knee  left in 1971      Frozen shoulder  2000      H/O Achilles tendon repair  lengthened bilaterally, 2000      Fractured skull  1968        MEDICATIONS  (STANDING):  atorvastatin 80 milliGRAM(s) Oral at bedtime  dextrose 5%. 1000 milliLiter(s) (50 mL/Hr) IV Continuous <Continuous>  dextrose 50% Injectable 50 milliLiter(s) IV Push every 15 minutes  dextrose 50% Injectable 25 milliLiter(s) IV Push every 15 minutes  epoetin mendoza-epbx (RETACRIT) Injectable 72288 Unit(s) IV Push <User Schedule>  heparin   Injectable 7500 Unit(s) SubCutaneous every 8 hours  insulin glargine Injectable (LANTUS) 30 Unit(s) SubCutaneous every morning  insulin lispro (ADMELOG) corrective regimen sliding scale   SubCutaneous three times a day before meals  insulin lispro (ADMELOG) corrective regimen sliding scale   SubCutaneous at bedtime  insulin lispro Injectable (ADMELOG) 5 Unit(s) SubCutaneous three times a day before meals  levothyroxine 75 MICROGram(s) Oral daily  metoprolol succinate ER 25 milliGRAM(s) Oral daily  midodrine 5 milliGRAM(s) Oral every 8 hours  midodrine      nystatin Powder 1 Application(s) Topical two times a day  pantoprazole    Tablet 40 milliGRAM(s) Oral before breakfast    MEDICATIONS  (PRN):  sodium chloride 0.9% lock flush 10 milliLiter(s) IV Push every 1 hour PRN Pre/post blood products, medications, blood draw, and to maintain line patency    Allergies    Ativan (Rash; Urticaria)  penicillins (Hives)    Intolerances      Vital Signs Last 24 Hrs  T(C): 36.6 (19 Jun 2023 04:15), Max: 36.9 (18 Jun 2023 20:38)  T(F): 97.9 (19 Jun 2023 04:15), Max: 98.4 (18 Jun 2023 20:38)  HR: 70 (19 Jun 2023 04:15) (69 - 70)  BP: 125/67 (19 Jun 2023 04:15) (104/61 - 125/67)  BP(mean): --  RR: 19 (19 Jun 2023 04:15) (17 - 19)  SpO2: 94% (19 Jun 2023 04:15) (93% - 96%)    Parameters below as of 19 Jun 2023 04:15  Patient On (Oxygen Delivery Method): room air      I&O's Summary    TELE: Not on telemetry   PHYSICAL EXAM:  Constitutional: NAD, awake and alert,obese  HEENT: Moist Mucous Membranes, Anicteric  Pulmonary: Non-labored, breath sounds are clear, dim at bases bilaterally, No wheezing, rales or rhonchi  Cardiovascular: Regular, S1 and S2, + murmurs, rubs, gallops or clicks  Gastrointestinal: Bowel Sounds present, soft, nontender.   Lymph: +3peripheral edema. No lymphadenopathy.  Skin: No visible rashes or ulcers.+ Rt IJ HD cath   Psych:  Mood & affect appropriate        LABS: All Labs Reviewed:                        8.6    8.26  )-----------( 262      ( 18 Jun 2023 06:18 )             27.7                         8.6    10.53 )-----------( 259      ( 17 Jun 2023 06:43 )             26.9                         9.0    12.00 )-----------( 230      ( 16 Jun 2023 15:30 )             27.5     18 Jun 2023 06:18    133    |  101    |  47     ----------------------------<  243    4.4     |  27     |  3.30   17 Jun 2023 06:43    135    |  101    |  42     ----------------------------<  222    4.9     |  29     |  3.30   16 Jun 2023 08:55    136    |  101    |  42     ----------------------------<  148    3.9     |  29     |  3.40     Ca    7.7        18 Jun 2023 06:18  Ca    7.9        17 Jun 2023 06:43  Ca    7.8        16 Jun 2023 08:55  Phos  4.2       17 Jun 2023 06:43  Mg     2.1       18 Jun 2023 06:18  Mg     2.2       17 Jun 2023 06:43            12 Lead ECG:   Ventricular Rate 83 BPM    Atrial Rate 83 BPM    P-R Interval 264 ms    QRS Duration 170 ms    Q-T Interval 446 ms    QTC Calculation(Bazett) 524 ms    P Axis 111 degrees    R Axis -61 degrees    T Axis 118 degrees    Diagnosis Line Sinus rhythm with 1st degree AV block  Left axis deviation  Left bundle branch block  Abnormal ECG  When compared with ECG of 05-JUN-2023 09:06,  No significant change was found  Confirmed by Leroy Hernández ([id number]) on 6/14/2023 7:21:13 AM (06-13-23 @ 13:34)      ACC: [id number] EXAM:  ECHO TTE WITH CON COMP W DOPP                          PROCEDURE DATE:  04/04/2022          INTERPRETATION:  INDICATION: Abnormal EKG  Sonographer AS    Blood Pressure 161/57    Height 180.3 cm     Weight 158.8 kg       BSA   2.8 sq m    Dimensions:  LA 4.2       Normal Values: 2.0 - 4.0 cm  Ao 3.3        Normal Values: 2.0 - 3.8 cm  SEPTUM 1.2       Normal Values: 0.6 - 1.2 cm  PWT 1.3       Normal Values: 0.6 - 1.1 cm  LVIDd 4.9         Normal Values: 3.0 - 5.6 cm  LVIDs 4.1         Normal Values: 1.8 - 4.0 cm      OBSERVATIONS:  Technically difficult and very limited study  Mitral Valve: Mitral annular calcification, trace physiologic MR.  Aortic Valve/Aorta: Aortic valve is not well-visualized. In limited views   the valve appears calcified with decreased opening. Peak transaortic   valve gradient is 26 mmHg with a mean transaortic valve gradient 15.4   mmHg.  Tricuspid Valve: Not well-visualized  Pulmonic Valve: Not well-visualized  Left Atrium: normal  Right Atrium: Not well-visualized  Left Ventricle: The left ventricular endocardium is not well-visualized.   Decreased global left ventricular systolic dysfunction with an LVEF of   35-40%. Endocardium visualization enhanced with intravenous injection of   ultrasonic enhancing agent (Definity)  Right Ventricle: Not well-visualized  Pericardium: no significant pericardial effusion.  Pulmonary/RV Pressure: estimated PA systolic pressure of 45 mmHg assuming   an RA pressure of 10 mmHg.  LV diastolic dysfunction is present        IMPRESSION:  Technically difficult and very limited study  The left ventricular endocardium is not well-visualized. Decreased global   left ventricular systolic dysfunction with an LVEF of 35-40%. Endocardium   visualization enhanced with intravenous injection of ultrasonic enhancing   agent (Definity)  The right ventricle is not well-visualized  The aortic valve is not well-visualized. In limited views the valve   appears calcified with decreased opening. Peak transaortic valve gradient   is 26 mmHg with a mean transaortic valve gradient 15.4 mmHg.  Trace physiologic MR  No significant pericardial effusion.    --- End of Report ---            LEROY HERNÁNDEZ MD; Attending Cardiologist  This document has been electronically signed. Apr 5 2022  5:17PM      [facility] -- Howard Kimble Pannella, Patel, Savella, Goodger  Office # [phone]    Follow Up:   hypotension    Subjective/Observations: Patient seen and examined, awake, alert in bed, denies chest pain, dyspnea, palpitations or dizziness, orthopnea and PND. Tolerating room air.   REVIEW OF SYSTEMS: All other review of systems is negative unless indicated above  PAST MEDICAL & SURGICAL HISTORY:  Hypertension      Diabetes      Lymphedema      H/O left bundle branch block      History of left bundle branch block (LBBB)      Systolic heart failure, chronic      H/O cataract  2020      History of surgical removal of meniscus of knee  left in 1971      Frozen shoulder  2000      H/O Achilles tendon repair  lengthened bilaterally, 2000      Fractured skull  1968        MEDICATIONS  (STANDING):  atorvastatin 80 milliGRAM(s) Oral at bedtime  dextrose 5%. 1000 milliLiter(s) (50 mL/Hr) IV Continuous <Continuous>  dextrose 50% Injectable 50 milliLiter(s) IV Push every 15 minutes  dextrose 50% Injectable 25 milliLiter(s) IV Push every 15 minutes  epoetin mendoza-epbx (RETACRIT) Injectable 00990 Unit(s) IV Push <User Schedule>  heparin   Injectable 7500 Unit(s) SubCutaneous every 8 hours  insulin glargine Injectable (LANTUS) 30 Unit(s) SubCutaneous every morning  insulin lispro (ADMELOG) corrective regimen sliding scale   SubCutaneous three times a day before meals  insulin lispro (ADMELOG) corrective regimen sliding scale   SubCutaneous at bedtime  insulin lispro Injectable (ADMELOG) 5 Unit(s) SubCutaneous three times a day before meals  levothyroxine 75 MICROGram(s) Oral daily  metoprolol succinate ER 25 milliGRAM(s) Oral daily  midodrine 5 milliGRAM(s) Oral every 8 hours  midodrine      nystatin Powder 1 Application(s) Topical two times a day  pantoprazole    Tablet 40 milliGRAM(s) Oral before breakfast    MEDICATIONS  (PRN):  sodium chloride 0.9% lock flush 10 milliLiter(s) IV Push every 1 hour PRN Pre/post blood products, medications, blood draw, and to maintain line patency    Allergies    Ativan (Rash; Urticaria)  penicillins (Hives)    Intolerances      Vital Signs Last 24 Hrs  T(C): 36.6 (19 Jun 2023 04:15), Max: 36.9 (18 Jun 2023 20:38)  T(F): 97.9 (19 Jun 2023 04:15), Max: 98.4 (18 Jun 2023 20:38)  HR: 70 (19 Jun 2023 04:15) (69 - 70)  BP: 125/67 (19 Jun 2023 04:15) (104/61 - 125/67)  BP(mean): --  RR: 19 (19 Jun 2023 04:15) (17 - 19)  SpO2: 94% (19 Jun 2023 04:15) (93% - 96%)    Parameters below as of 19 Jun 2023 04:15  Patient On (Oxygen Delivery Method): room air      I&O's Summary    TELE: Not on telemetry   PHYSICAL EXAM:  Constitutional: NAD, awake and alert, obese  HEENT: Moist Mucous Membranes, Anicteric  Pulmonary: Non-labored, breath sounds are clear, dim at bases bilaterally, No wheezing, rales or rhonchi  Cardiovascular: Regular, S1 and S2, + murmurs, rubs, gallops or clicks  Gastrointestinal: Bowel Sounds present, soft, nontender.   Lymph: +3peripheral edema. No lymphadenopathy.  Skin: No visible rashes or ulcers.+ Rt IJ HD cath   Psych:  Mood & affect appropriate        LABS: All Labs Reviewed:                        8.6    8.26  )-----------( 262      ( 18 Jun 2023 06:18 )             27.7                         8.6    10.53 )-----------( 259      ( 17 Jun 2023 06:43 )             26.9                         9.0    12.00 )-----------( 230      ( 16 Jun 2023 15:30 )             27.5     18 Jun 2023 06:18    133    |  101    |  47     ----------------------------<  243    4.4     |  27     |  3.30   17 Jun 2023 06:43    135    |  101    |  42     ----------------------------<  222    4.9     |  29     |  3.30   16 Jun 2023 08:55    136    |  101    |  42     ----------------------------<  148    3.9     |  29     |  3.40     Ca    7.7        18 Jun 2023 06:18  Ca    7.9        17 Jun 2023 06:43  Ca    7.8        16 Jun 2023 08:55  Phos  4.2       17 Jun 2023 06:43  Mg     2.1       18 Jun 2023 06:18  Mg     2.2       17 Jun 2023 06:43            12 Lead ECG:   Ventricular Rate 83 BPM    Atrial Rate 83 BPM    P-R Interval 264 ms    QRS Duration 170 ms    Q-T Interval 446 ms    QTC Calculation(Bazett) 524 ms    P Axis 111 degrees    R Axis -61 degrees    T Axis 118 degrees    Diagnosis Line Sinus rhythm with 1st degree AV block  Left axis deviation  Left bundle branch block  Abnormal ECG  When compared with ECG of 05-JUN-2023 09:06,  No significant change was found  Confirmed by Leroy Hernández ([id number]) on 6/14/2023 7:21:13 AM (06-13-23 @ 13:34)      ACC: [id number] EXAM:  ECHO TTE WITH CON COMP W DOPP                          PROCEDURE DATE:  04/04/2022          INTERPRETATION:  INDICATION: Abnormal EKG  Sonographer AS    Blood Pressure 161/57    Height 180.3 cm     Weight 158.8 kg       BSA   2.8 sq m    Dimensions:  LA 4.2       Normal Values: 2.0 - 4.0 cm  Ao 3.3        Normal Values: 2.0 - 3.8 cm  SEPTUM 1.2       Normal Values: 0.6 - 1.2 cm  PWT 1.3       Normal Values: 0.6 - 1.1 cm  LVIDd 4.9         Normal Values: 3.0 - 5.6 cm  LVIDs 4.1         Normal Values: 1.8 - 4.0 cm      OBSERVATIONS:  Technically difficult and very limited study  Mitral Valve: Mitral annular calcification, trace physiologic MR.  Aortic Valve/Aorta: Aortic valve is not well-visualized. In limited views   the valve appears calcified with decreased opening. Peak transaortic   valve gradient is 26 mmHg with a mean transaortic valve gradient 15.4   mmHg.  Tricuspid Valve: Not well-visualized  Pulmonic Valve: Not well-visualized  Left Atrium: normal  Right Atrium: Not well-visualized  Left Ventricle: The left ventricular endocardium is not well-visualized.   Decreased global left ventricular systolic dysfunction with an LVEF of   35-40%. Endocardium visualization enhanced with intravenous injection of   ultrasonic enhancing agent (Definity)  Right Ventricle: Not well-visualized  Pericardium: no significant pericardial effusion.  Pulmonary/RV Pressure: estimated PA systolic pressure of 45 mmHg assuming   an RA pressure of 10 mmHg.  LV diastolic dysfunction is present        IMPRESSION:  Technically difficult and very limited study  The left ventricular endocardium is not well-visualized. Decreased global   left ventricular systolic dysfunction with an LVEF of 35-40%. Endocardium   visualization enhanced with intravenous injection of ultrasonic enhancing   agent (Definity)  The right ventricle is not well-visualized  The aortic valve is not well-visualized. In limited views the valve   appears calcified with decreased opening. Peak transaortic valve gradient   is 26 mmHg with a mean transaortic valve gradient 15.4 mmHg.  Trace physiologic MR  No significant pericardial effusion.    --- End of Report ---            LEROY HERNÁNDEZ MD; Attending Cardiologist  This document has been electronically signed. Apr 5 2022  5:17PM

## 2023-06-19 NOTE — DISCHARGE NOTE NURSING/CASE MANAGEMENT/SOCIAL WORK - NSDCPEFALRISK_GEN_ALL_CORE
For information on Fall & Injury Prevention, visit: [email or web address] OR  [email or web address] OR  https://www.cdc.gov/steadi/patient.html

## 2023-06-19 NOTE — DISCHARGE NOTE PROVIDER - NSDCMRMEDTOKEN_GEN_ALL_CORE_FT
aspirin 81 mg oral tablet: 1 tab(s) orally once a day  atorvastatin 80 mg oral tablet: 1 tab(s) orally once a day  eplerenone 25 mg oral tablet: 1 tab(s) orally once a day  Lantus 100 units/mL subcutaneous solution: 37 unit(s) subcutaneous once a day (at bedtime)  levothyroxine 75 mcg (0.075 mg) oral tablet: 1 tab(s) orally once a day  NovoLOG 100 units/mL subcutaneous solution: subcutaneous Sliding scale MDD 40units  potassium chloride 10 mEq oral capsule, extended release: 2 cap(s) orally once a day   PT and OT: 3 to 5 times a week MDD: 1  Toprol- mg oral tablet, extended release: 1 tab(s) orally once a day  torsemide 20 mg oral tablet: 1 tab(s) orally 2 times a day

## 2023-06-19 NOTE — PROGRESS NOTE ADULT - SUBJECTIVE AND OBJECTIVE BOX
CAPILLARY BLOOD GLUCOSE      POCT Blood Glucose.: 144 mg/dL (19 Jun 2023 08:18)  POCT Blood Glucose.: 109 mg/dL (18 Jun 2023 21:58)  POCT Blood Glucose.: 139 mg/dL (18 Jun 2023 16:31)  POCT Blood Glucose.: 171 mg/dL (18 Jun 2023 12:02)      Vital Signs Last 24 Hrs  T(C): 36.6 (19 Jun 2023 04:15), Max: 36.9 (18 Jun 2023 20:38)  T(F): 97.9 (19 Jun 2023 04:15), Max: 98.4 (18 Jun 2023 20:38)  HR: 70 (19 Jun 2023 04:15) (69 - 70)  BP: 125/67 (19 Jun 2023 04:15) (104/61 - 125/67)  BP(mean): --  RR: 19 (19 Jun 2023 04:15) (17 - 19)  SpO2: 94% (19 Jun 2023 04:15) (93% - 96%)    Parameters below as of 19 Jun 2023 04:15  Patient On (Oxygen Delivery Method): room air        General: WN/WD NAD  Respiratory: CTA B/L  CV: RRR, S1S2, no murmurs, rubs or gallops  Abdominal: Soft, NT, ND +BS, Last BM  Extremities: No edema, + peripheral pulses     06-18    133<L>  |  101  |  47<H>  ----------------------------<  243<H>  4.4   |  27  |  3.30<H>    Ca    7.7<L>      18 Jun 2023 06:18  Mg     2.1     06-18        atorvastatin 80 milliGRAM(s) Oral at bedtime  dextrose 50% Injectable 50 milliLiter(s) IV Push every 15 minutes  dextrose 50% Injectable 25 milliLiter(s) IV Push every 15 minutes  insulin glargine Injectable (LANTUS) 30 Unit(s) SubCutaneous every morning  insulin lispro (ADMELOG) corrective regimen sliding scale   SubCutaneous three times a day before meals  insulin lispro (ADMELOG) corrective regimen sliding scale   SubCutaneous at bedtime  insulin lispro Injectable (ADMELOG) 5 Unit(s) SubCutaneous three times a day before meals  levothyroxine 75 MICROGram(s) Oral daily

## 2023-06-19 NOTE — PROGRESS NOTE ADULT - PROBLEM SELECTOR PLAN 1
IV ABX - rocephin per ID  ID eval and follow up with Dr. MITZI sultana
IV ABX - rocephin per ID  ID eval with Dr. MITZI sultana
IV ABX  ID eval with Dr. CARTAGENA
IV ABX - rocephin per ID  ID eval with Dr. MITZI sultana
IV ABX - rocephin per ID  ID eval with Dr. MITZI sultana
cont lantus 30 units qam  cont admelog 5 units 3x/day before meals  cont admelog corrective scale coverage qac/qhs  cont cons cho diet  goal bg 100-180 in hosp setting
cont lantus 30 units qam  cont admelog 5 units 3x/day before meals  cont mod dose admelog corrective scale coverage qac/qhs  cont cons cho diet  goal bg 100-180 in hosp setting
Clinically improving on ceftriaxone.
IV ABX - rocephin per ID  ID eval with Dr. MITZI sultana
S/P IV ABX - rocephin per ID  ID eval and follow up with Dr. MITZI sultana
IV ABX per ID  ID eval with Dr. MITZI sultana
S/P IV ABX - rocephin per ID  ID eval and follow up with Dr. MITZI sultana
S/P IV ABX - rocephin per ID  ID eval and follow up with Dr. MITZI sultana
IV ABX - rocephin per ID  ID eval and follow up with Dr. MITZI sultana
IV ABX - rocephin per ID  ID eval with Dr. MITZI sultana
S/P IV ABX - rocephin per ID  ID eval and follow up with Dr. MITZI sultana

## 2023-06-19 NOTE — DISCHARGE NOTE PROVIDER - CARE PROVIDER_API CALL
Onesimo Gandhi  Physician Assistant Services  [address], [address]  [place] [address]  Phone: [phone]  Fax: [phone]  Follow Up Time:     Jluis Wilde  Nephrology  [address]  [place] [address]  Phone: [phone]  Fax: [phone]  Follow Up Time:     Tera Guerrero  Infectious Disease  [address] [place] [id number]  Phone: [phone]  Fax: [phone]  Follow Up Time:

## 2023-06-19 NOTE — PROGRESS NOTE ADULT - PROVIDER SPECIALTY LIST ADULT
Critical Care
Infectious Disease
Nephrology
Urology
Critical Care
Infectious Disease
Nephrology
Cardiology
Critical Care
Critical Care
Infectious Disease
Nephrology
Cardiology
Critical Care
Critical Care
Family Medicine
Infectious Disease
Infectious Disease
Internal Medicine
Nephrology
Nephrology
Endocrinology
Internal Medicine
Endocrinology
Family Medicine
Hospitalist
Hospitalist
Family Medicine

## 2023-06-19 NOTE — DISCHARGE NOTE PROVIDER - NSDCCPCAREPLAN_GEN_ALL_CORE_FT
PRINCIPAL DISCHARGE DIAGNOSIS  Diagnosis: Severe sepsis  Assessment and Plan of Treatment: follow up with ID MD Dr. MENDOSA

## 2023-06-19 NOTE — DISCHARGE NOTE PROVIDER - PROVIDER TOKENS
PROVIDER:[TOKEN:[[id number]]],PROVIDER:[TOKEN:[[id number]]],PROVIDER:[TOKEN:[[id number]]]

## 2023-06-19 NOTE — CASE MANAGEMENT PROGRESS NOTE - NSCMPROGRESSNOTE_GEN_ALL_CORE
Patient is for possible transition home today Pending clearance from Nephrology. Physical therapist recommended  a bariatric RW and home PT/OT to be followed by outpatient PT. CM met with patient to discuss home care. She declined home PT/OT and Nursing stating she has Lymphedema equipment at home. Rx for outpatient PT/OT offered, patient not receptive to it at present. RX left in front of chart. Patient is willing to accept RW, said thank you. Referral sent to [facility].

## 2023-06-19 NOTE — PROGRESS NOTE ADULT - SUBJECTIVE AND OBJECTIVE BOX
Patient is a 72y old  Female who presents with a chief complaint of sepsis (04 Jun 2023 10:59)       HPI: female with history of insulin-dependent diabetes, CHF, hypertension who presented to the ED with nausea and vomiting for several days.  She has experienced no significant abdominal pain and history of multiple dark emesis and 1 episode of diarrhea today.  She denies fever or chills.  Denies URI symptoms.  She denies significant change in her urinary patters. CT performed on admission demonstrated mild left hydronephrosis without ureteral dilatation or obstructing calculus. Found to have DUNCAN and hypotension. Renal consulted for further eval. Has not seen a Nephrologist outpatient. Currently asymptomatic.      No acute events noted overnight, No N/V/SOB, Urinating    PAST MEDICAL & SURGICAL HISTORY:  Hypertension      Diabetes      Lymphedema      H/O left bundle branch block      History of left bundle branch block (LBBB)      Systolic heart failure, chronic      H/O cataract  2020      History of surgical removal of meniscus of knee  left in 1971      Frozen shoulder  2000      H/O Achilles tendon repair  lengthened bilaterally, 2000      Fractured skull  1968         MEDICATIONS  (STANDING):  aspirin  chewable 81 milliGRAM(s) Oral daily  atorvastatin 80 milliGRAM(s) Oral at bedtime  cefTRIAXone   IVPB 2000 milliGRAM(s) IV Intermittent once  dextrose 5%. 1000 milliLiter(s) (50 mL/Hr) IV Continuous <Continuous>  dextrose 50% Injectable 50 milliLiter(s) IV Push every 15 minutes  dextrose 50% Injectable 25 milliLiter(s) IV Push every 15 minutes  heparin   Injectable 7500 Unit(s) SubCutaneous every 8 hours  insulin glargine Injectable (LANTUS) 25 Unit(s) SubCutaneous every morning  insulin lispro (ADMELOG) corrective regimen sliding scale   SubCutaneous at bedtime  insulin lispro (ADMELOG) corrective regimen sliding scale   SubCutaneous three times a day before meals  levothyroxine 75 MICROGram(s) Oral daily  metoprolol succinate ER 25 milliGRAM(s) Oral daily  midodrine      midodrine 5 milliGRAM(s) Oral every 8 hours  nystatin Powder 1 Application(s) Topical two times a day  pantoprazole    Tablet 40 milliGRAM(s) Oral before breakfast    MEDICATIONS  (PRN):  sodium chloride 0.9% lock flush 10 milliLiter(s) IV Push every 1 hour PRN Pre/post blood products, medications, blood draw, and to maintain line patency      FAMILY HISTORY:  Family history of CVA  mom, age 57    [place]    Social History:Non smoker        Allergies    penicillins (Hives)    Intolerances         REVIEW OF SYSTEMS:    as above    ICU Vital Signs Last 24 Hrs  T(C): 36.3 (19 Jun 2023 13:00), Max: 36.9 (18 Jun 2023 20:38)  T(F): 97.4 (19 Jun 2023 13:00), Max: 98.4 (18 Jun 2023 20:38)  HR: 67 (19 Jun 2023 13:00) (67 - 70)  BP: 114/66 (19 Jun 2023 13:00) (107/64 - 125/67)  BP(mean): --  ABP: --  ABP(mean): --  RR: 18 (19 Jun 2023 13:00) (18 - 19)  SpO2: 96% (19 Jun 2023 13:00) (94% - 96%)    O2 Parameters below as of 19 Jun 2023 13:00  Patient On (Oxygen Delivery Method): room air            PHYSICAL EXAM:    GENERAL: NAD  HEAD:  Atraumatic, Normocephalic  EYES: EOMI, conjunctiva and sclera clear  ENMT: No Drainage from nares, No drainage from ears  NECK: Supple, +dialysis catheter intact  NERVOUS SYSTEM:  Awake and Alert  CHEST/LUNG: Clear to percussion bilaterally; No rales, rhonchi, wheezing, or rubs  HEART: Regular rate and rhythm; No murmurs, rubs, or gallops  ABDOMEN: Soft, Nontender, Nondistended; Bowel sounds present, obese  EXTREMITIES:  + Edema  SKIN: No rashes No obvious ecchymosis      LABS:                                   8.7    9.01  )-----------( 288      ( 19 Jun 2023 09:38 )             28.0     06-19    135  |  102  |  44<H>  ----------------------------<  155<H>  4.3   |  25  |  3.20<H>    Ca    7.8<L>      19 Jun 2023 11:33  Phos  4.2     06-19  Mg     2.2     06-19

## 2023-06-19 NOTE — DISCHARGE NOTE PROVIDER - HOSPITAL COURSE
admitted for septic shock  given pressor support  sepsis from UTI - ABX per ID  DUNCAN on CKD  HD per renal  taken off nephrotoxins  DC after  ID and renal clearance

## 2023-06-19 NOTE — PROGRESS NOTE ADULT - ASSESSMENT
71yo F with PMHx IDDM2, non obstructive CAD,  HFrEF, HTN, hypothyroidism, LBBB, chronic lymphedema presenting with decreased appetite and multiple episodes of emesis, admitted in the ICU for septic shock 2/2 UTI.    Hypotension, Non Obs CAD, HFrEF, HTN, LBBB, Chr Lymphedema  - a/w septic shock requiring ICU level of care, on pressor (now weaned off), worsening renal indices on HD, now on GMF     - BP has been soft 100 -120's, with sig drop 6/13 while working with PT 80-90's systolics,  + orthostatic   - BP improving 90- 120's, asymptomatic   - continue midodrine 5mg PO TID, can increase to 10 mg if necessary    - TTE: (06.06.23) LVSF mildly EF 38 %. Moderate aortic stenosis. Trace MR, TR, The inferior vena cava is normal measuring 1.86 cm in diameter  - Continue to hold home BB, ARNI 2/2 hypotension, on Midodrine   - Renal following, HD for volume removal, creat ~3.3- 3.4    - LE edema improving per patient      - known hx of non obstructive CAD from cardiac cath (7/2022), HFrEF   - EKG : SR 1st deg AVB LBBB, unchanged from prior EKG  - No evidence of any active ischemia   - Continue aspirin and Lipitor     - Transfuse PRN to keep hgb>8 given cardiac hx.  - Monitor and replete Lytes. Keep K > 4 and Mg > 2  - Will continue to follow.    Azalea Murillo [facility]  Nurse Practitioner - Cardiology   Spectra #[id number]/ [phone]  call TEAMS   71yo F with PMHx IDDM2, non obstructive CAD,  HFrEF, HTN, hypothyroidism, LBBB, chronic lymphedema presenting with decreased appetite and multiple episodes of emesis, admitted in the ICU for septic shock 2/2 UTI.    Hypotension, Non Obs CAD, HFrEF, HTN, LBBB, Chr Lymphedema  - BP improving 110-120's   - + orthostatic, remains asymptomatic   - continue midodrine 5mg PO TID, can increase to 10 mg if necessary    - TTE: (06.06.23) LVSF mildly EF 38 %. Moderate aortic stenosis. Trace MR, TR, The inferior vena cava is normal measuring 1.86 cm in diameter  - Continue to hold home BB, ARNI 2/2 hypotension, on Midodrine   - Renal following, HD on hold, renal indices improving, creat ~3.3- 3.4    - LE edema improving per patient      - known hx of non obstructive CAD from cardiac cath (7/2022), HFrEF   - EKG : SR 1st deg AVB LBBB, unchanged from prior EKG  - No evidence of any active ischemia   - Continue aspirin and Lipitor     - Transfuse PRN to keep hgb>8 given cardiac hx.  - Monitor and replete Lytes. Keep K > 4 and Mg > 2  - Will continue to follow.    Azalea Murillo [facility]  Nurse Practitioner - Cardiology   Spectra #[id number]/ [phone]  call TEAMS

## 2023-06-29 NOTE — PHYSICAL EXAM
[General Appearance - Alert] : alert [General Appearance - In No Acute Distress] : in no acute distress [Sclera] : the sclera and conjunctiva were normal [PERRL With Normal Accommodation] : pupils were equal in size, round, reactive to light [Extraocular Movements] : extraocular movements were intact [Outer Ear] : the ears and nose were normal in appearance [Oropharynx] : the oropharynx was normal with no thrush [Auscultation Breath Sounds / Voice Sounds] : lungs were clear to auscultation bilaterally [Heart Rate And Rhythm] : heart rate was normal and rhythm regular [Heart Sounds] : normal S1 and S2 [Heart Sounds Gallop] : no gallops [Murmurs] : no murmurs [Heart Sounds Pericardial Friction Rub] : no pericardial rub [Bowel Sounds] : normal bowel sounds [Abdomen Soft] : soft [Abdomen Tenderness] : non-tender [Abdomen Mass (___ Cm)] : no abdominal mass palpated [Costovertebral Angle Tenderness] : no CVA tenderness [No Palpable Adenopathy] : no palpable adenopathy [FreeTextEntry1] : Lymphdema bilaterally  [Skin Color & Pigmentation] : normal skin color and pigmentation [] : no rash [Deep Tendon Reflexes (DTR)] : deep tendon reflexes were 2+ and symmetric [Sensation] : the sensory exam was normal to light touch and pinprick [No Focal Deficits] : no focal deficits [Oriented To Time, Place, And Person] : oriented to person, place, and time [Affect] : the affect was normal

## 2023-06-29 NOTE — ASSESSMENT
[FreeTextEntry1] : 71y/o woman with urosepsis and septic shock few weeks ago, with UC and BC with ecoli, was treated for 10days. Now back to her baseline.\par No need for any labs or treatment. \par WIll watch closely.\par Needs to follow up with PCP and nephrologist watch CKD. HD as needed. \par IF fever or urinary symptoms or flank pain will go to ED. \par RTC PRN\par \par Patient was given the opportunity to ask questions and all questions were answered to their satisfaction.\par Counseling included lab results, differential diagnosis, treatment options, risks and benefits, lifestyle changes, current condition, medications and dose adjustments.\par The patient verbalized understanding.\par  [Treatment Education] : treatment education [Risk Reduction] : risk reduction [Universal Precautions] : universal precautions [Anticipatory Guidance] : anticipatory guidance

## 2023-06-29 NOTE — HISTORY OF PRESENT ILLNESS
[FreeTextEntry1] : 71yo woman with PMHx IDDM2, HFrEF, HTN, hypothyroidism, LBBB, chronic lymphedema\par admitted at [facility] on 6/4 with septic shock and Ecoli bacteremia sec Left pyelo with hydro- no\par obtrusive uropathy on CT.\par Was admitted in ICU and was on pressors initially.\par Blood and urine cultures both a pansensitive Ecoli\par Was treated with 10days of ceftriaxone \par Had worsening of renal function for which had HD in the hospital but later CKD went back to her baseline so now she is off HD. \par No complaint today, back to her normal, no dysuria or diarrhea.\par No pain or fever/chills. Labs on discharge normal WBC. Repeat cultures negative.

## 2023-08-06 NOTE — PLAN
[FreeTextEntry1] : .Patient seen and evaluated. Discussed etiology and treatment plan. Patient verbalized understanding. Ordered X- rays of the right foot, will order MRI to r/o OM vs any osseous or soft tissue pathology. Ordered vascular studies and will request for  vascular consult. Patient will benefit from HBOT for chronic non healing diabetic ulcer. Discussed the benefits of HBOT and will order bloodwork,  and request for authorization. Patient agreed for HBOT, will RTC in one week. Spent 30 minutes for patient care and medical decision making.

## 2023-08-06 NOTE — PLAN
[FreeTextEntry1] : .Patient seen and evaluated. Discussed etiology and treatment plan. Patient verbalized understanding. Order MRI to r/o OM vs any osseous or soft tissue pathology. Patient scheduled for  vascular studies and will request for  vascular consult. Patient will benefit from HBOT for chronic non healing diabetic ulcer. Discussed the benefits of HBOT, auth obtained. Patient will start from Monday. Patient agreed for HBOT, will RTC in one week. Spent 20 minutes for patient care and medical decision making.

## 2023-08-06 NOTE — PHYSICAL EXAM
[2+] : left 2+ [Ankle Swelling (On Exam)] : not present [Varicose Veins Of Lower Extremities] : not present [] : not present [Alert] : alert [Skin Ulcer] : ulcer [Oriented to Person] : oriented to person [Oriented to Place] : oriented to place [Oriented to Time] : oriented to time [Calm] : calm [de-identified] : HTN, diabetic small vessel disease  [de-identified] : A&Ox3, NAD [de-identified] : morbid obesity  [de-identified] : s/p right foot sesamoidectomy, 3/5 strength in all quadrants bilaterally [de-identified] : Diminished light touch sensation bilaterally [de-identified] : right foot plantar 1st metatarsal diabetic ulcer down to the level of fat, no purulence, no edema, no erythema , no signs of infection  [FreeTextEntry1] : Plantar Foot  [FreeTextEntry2] : 0.5 [FreeTextEntry3] : 0.2 [FreeTextEntry4] : 0.2 [de-identified] : None  [de-identified] : None  [de-identified] : Silver Alginate  [de-identified] : Cleansed Mechanically with 0.9% normal Saline.  [TWNoteComboBox1] : Right [TWNoteComboBox5] : Yes [TWNoteComboBox6] : Pressure [de-identified] : No [de-identified] : Normal [de-identified] : None [de-identified] : None [de-identified] : 100% [de-identified] : No [de-identified] : 3x Weekly [de-identified] : Primary Dressing

## 2023-08-06 NOTE — PHYSICAL EXAM
[4 x 4] : 4 x 4  [2+] : left 2+ [Skin Ulcer] : ulcer [Alert] : alert [Oriented to Person] : oriented to person [Oriented to Place] : oriented to place [Oriented to Time] : oriented to time [Calm] : calm [Ankle Swelling (On Exam)] : not present [Varicose Veins Of Lower Extremities] : not present [] : not present [de-identified] : A&Ox3, NAD [de-identified] : morbid obesity  [de-identified] : HTN, diabetic small vessel disease  [de-identified] : s/p right foot sesamoidectomy, 3/5 strength in all quadrants bilaterally [de-identified] : right foot plantar 1st metatarsal diabetic ulcer down to the level of fat, no purulence, no edema, no erythema , no signs of infection  [de-identified] : Diminished light touch sensation bilaterally [FreeTextEntry1] : Plantar Foot [FreeTextEntry2] : 1.1 [FreeTextEntry3] : 0.3 [FreeTextEntry4] : 0.4 [de-identified] : intact/ callus [de-identified] : Alginate Ag [de-identified] : Mechanically cleansed with 0.9% NS and sterile gauze\par \par Kerlix [TWNoteComboBox1] : Right [TWNoteComboBox4] : None [TWNoteComboBox5] : No [TWNoteComboBox6] : Surgical [de-identified] : other [de-identified] : No [de-identified] : None [de-identified] : None [de-identified] : 100% [de-identified] : No [de-identified] : 3x Weekly [de-identified] : Primary Dressing

## 2023-08-06 NOTE — ASSESSMENT
[Verbal] : Verbal [Patient] : Patient [Good - alert, interested, motivated] : Good - alert, interested, motivated [Demonstrates independently] : demonstrates independently [Dressing changes] : dressing changes [Foot Care] : foot care [Skin Care] : skin care [Pressure relief] : pressure relief [Signs and symptoms of infection] : sign and symptoms of infection [Nutrition] : nutrition [How and When to Call] : how and when to call [Pain Management] : pain management [Off-loading] : off-loading [Patient responsibility to plan of care] : patient responsibility to plan of care [Stable] : stable [Home] : Home [Not Applicable - Long Term Care/Home Health Agency] : Long Term Care/Home Health Agency: Not Applicable [Cane] : Cane [] : No [FreeTextEntry2] : Infection Control  Off loading  Nutrition  Wound Care  Infection Control  Skin Integrity  [FreeTextEntry4] : PPt presented with a wound size decrease. MD Moat assessed the wound. Dressings applied as ordered. Pt to begin HBO. HBO authorized. Pt to go to X-ray today 8/3/23 for HBO start. MRI auth submitted.  Pt states would like to start HBO Monday 8/7/2023. Pt to return in one week for follow up.

## 2023-08-06 NOTE — ASSESSMENT
[Verbal] : Verbal [Demo] : Demo [Patient] : Patient [Good - alert, interested, motivated] : Good - alert, interested, motivated [Verbalizes knowledge/Understanding] : Verbalizes knowledge/understanding [Dressing changes] : dressing changes [Skin Care] : skin care [Signs and symptoms of infection] : sign and symptoms of infection [Nutrition] : nutrition [Patient responsibility to plan of care] : patient responsibility to plan of care [Glycemic Control] : glycemic control [] : Yes [Stable] : stable [Home] : Home [Not Applicable - Long Term Care/Home Health Agency] : Long Term Care/Home Health Agency: Not Applicable [Cane] : Cane [FreeTextEntry2] : Pt will maintain skin integrity\par Pt will monitor wounds for s/s of infection\par Pt will adhere to proper diet for optimal wound healing [FreeTextEntry3] : N/A new eval [FreeTextEntry4] : Xray, MRI, Vascular studies ordered by DPM\par \par RN submitted authorization for:  MRI right foot, Vascular studies, HBOT\par \par Tx:  Alginate Ag / DD / Kerlix\par \par Follow up in 1 week

## 2023-08-06 NOTE — HISTORY OF PRESENT ILLNESS
[FreeTextEntry1] : Patient is 73 year old female presenting for diabetic foot ulcer to the right foot that has been present since April 2023 and was being treated by patient's Podiatrist. Per patient's history she first presented to [facility] January 2021 s/p right foot incision and drainage with sesamoidectomy and removal of foreign body (pt stepped on an insulin needle at home). Surgical wound reopened April 2023 and pt was referred to [facility] by her podiatrist, Gustavo Mohan. Pt is diabetic with chronic kidney failure (non-dialysis, without neuropathy). Palpable pulses, bilaterally. BLE lymphedema.

## 2023-08-06 NOTE — REASON FOR VISIT
[Consultation] : a consultation visit [Other: _____] : [unfilled] [FreeTextEntry1] : Pt presents to [facility] today for initial assessment of right plantar foot wound x 3 months.

## 2023-08-06 NOTE — HISTORY OF PRESENT ILLNESS
[FreeTextEntry1] : Patient is 73 year old female presenting for follow up of DFU3 plantar right foot down to the level of fat. Patient has been changing the dressing as advised. Patient is scheduled for vascular studies. Denies any pain to the right foot.

## 2023-08-06 NOTE — REVIEW OF SYSTEMS
[Fever] : no fever [Eye Pain] : no eye pain [Chills] : no chills [Earache] : no earache [Loss Of Hearing] : no hearing loss [Chest Pain] : no chest pain [Shortness Of Breath] : no shortness of breath [Cough] : no cough [Abdominal Pain] : no abdominal pain [Joint Stiffness] : joint stiffness [Skin Wound] : skin wound [Anxiety] : no anxiety [FreeTextEntry2] : morbid obesity [Easy Bleeding] : no tendency for easy bleeding [FreeTextEntry5] : HTN , HLD [de-identified] : right foot plantar  1st metatarsal, DFU 3  down to the level of fat [FreeTextEntry9] : morbid obesity  [de-identified] : diabetic neuropathy [de-identified] : KENNY

## 2023-08-06 NOTE — REVIEW OF SYSTEMS
[Joint Stiffness] : joint stiffness [Skin Wound] : skin wound [Chills] : no chills [Fever] : no fever [Eye Pain] : no eye pain [Earache] : no earache [Loss Of Hearing] : no hearing loss [Chest Pain] : no chest pain [Shortness Of Breath] : no shortness of breath [Cough] : no cough [Abdominal Pain] : no abdominal pain [Anxiety] : no anxiety [Easy Bleeding] : no tendency for easy bleeding [FreeTextEntry2] : morbid obesity [FreeTextEntry9] : morbid obesity  [FreeTextEntry5] : HTN , HLD [de-identified] : right foot plantar  1st metatarsal, DFU 3  down to the level of fat [de-identified] : diabetic neuropathy [de-identified] : KENNY

## 2023-08-08 NOTE — ADDENDUM
CTA CHEST WITH IV CONTRAST



INDICATION / CLINICAL INFORMATION:

chest pain, elevated ddimer.



TECHNIQUE:

Axial CT images were obtained through the chest after injection of 100 cc Omnipaque 350 IV contrast. 
3 plane MIP and/or 3D reconstructions were produced. All CT scans at this location are performed usin
g CT dose reduction for ANSHUL by means of automated exposure control. 



COMPARISON:

Chest radiograph 1/2/2022, CTA chest 2/26/2019



FINDINGS:

PULMONARY ARTERIES: No pulmonary emboli.

THORACIC AORTA: No significant abnormality. 

HEART: No significant abnormality.

CORONARY ARTERIES: Significant coronary artery calcification is noted. Prior CABG surgery

PLEURA: No pleural effusion. No pneumothorax.

LYMPH NODES: No significant adenopathy.

LUNGS: No acute air space or interstitial disease. 



ADDITIONAL FINDINGS: None.



UPPER ABDOMEN: Gallstones are present within the gallbladder. No obvious signs of acute cholecystitis
. Gallbladder is incompletely visualized, however.



Calcified plaque noted throughout the visualized portion of the abdominal aorta.



SKELETAL STRUCTURES: No significant acute osseous abnormality.



IMPRESSION:

1. No CT evidence for pulmonary embolism. 

2. No acute pulmonary or pleural disease.

3. Cholelithiasis without CT suggestion of acute cholecystitis. However, please note that the gallbla
dder is incompletely visualized and clinical correlation is recommended.



Signer Name: Keshia Gomes MD 

Signed: 1/3/2022 1:32 AM

Workstation Name: [id number] [FreeTextEntry1] : Patient arrived to HBOT suite safely. Patient vitals assessed prior to descent to reveal stable values for treatment. Patient dive orders verified prior to descent. CHAMBER #3 RUN # Patient descended to 2.0 EULALIO  @ 1.75 PSI/Min compression with no discomfort or intervention required. Patient resting comfortably at Rx depth with equal and adequate chest rise and fall noted throughout. Patient ascended to surface over a nine minute decompression with no discomfort or intervention required. Patient vitals assessed post-treatment to reveal stable values for departure. Patient exited HBOT suite safely. NOTHING FURTHER TO REPORT.

## 2023-08-08 NOTE — PROCEDURE
[Outpatient] : Outpatient [THIS CHAMBER HAS BEEN CLEANED / DISINFECTED] : This chamber has been cleaned / disinfected according to local and hospital policy and procedure prior to this treatment. [100% Cotton] : 100% cotton [Empty all pockets] : empty all pockets [No hair oils, wigs, hairpieces, pins] : no hair oils, wigs, hairpieces, pins  [Pre tx medications] : pre tx medications  [No make-up, creams] : no make-up, creams  [No jewelry] : no jewelry  [No matches, cigarettes, lighters] : no matches, cigarettes, lighters  [Hearing aid removed] : hearing aid removed [Dentures removed] : dentures removed [Ground bracelet on pt's wrist] : ground bracelet on pt's wrist  [Contacts removed] : contacts removed  [Remove nail polish] : remove nail polish  [No reading material] : no reading material  [Bra, undergarments removed] : bra, undergarments removed  [No contraindicated dressings] : no contraindicated dressings [Ground Wire - VISUAL Verification - Intact/Free of Obstruction] : Ground Wire - VISUAL Verification - Intact/Free of Obstruction  [Ground Continuity - Verified < 1 ohm w/ Wrist Strap Channing] : Ground Continuity - Verified < 1 ohm w/ Wrist Strap Channing [Number: ___] : Number: [unfilled] [Ambulatory] : Patient is ambulatory. [Cane] : cane [____] : Post-Dive: Time - [unfilled] [___] : Post-Dive: Value - [unfilled] mg/dL [Patient demonstrated and verbalized proper technique for using air break mask] : Patient demonstrated and verbalized proper technique for using air break mask [Patient educated on the risks of SMOKING prior to HBOT with understanding] : Patient educated on the risks of SMOKING prior to HBOT with understanding [Patient educated on the risks of CONSUMING ALCOHOL prior to HBOT with understanding] : Patient educated on the risks of CONSUMING ALCOHOL prior to HBOT with understanding [Clear all fields] : clear all fields [Diagnosis: ___] : Diagnosis: [unfilled] [] : No [FreeTextEntry1] : Chamber 3  [FreeTextEntry3] : 90 [FreeTextEntry5] : 2332 [facility] : 9372 [FreeTextEntry9] : 9206 [de-identified] : 1508 [de-identified] : 108 [de-identified] : ALEX DIAZ

## 2023-08-10 NOTE — ADDENDUM
[FreeTextEntry1] : PT A&OX3 AND AMBULATING UNASSISTED INTO HYPERBARIC SUITE PT ORDER VERIFIED PRIOR TO TREATMENT PT CONTRAINDICATION LIST AND PRE CHECK LIST VERIFIED PT VITALS WERE WITHIN PARAMETERS FOR HBOT PT WOUND DRESSING IS DRY AND INTACT PT DESCENDED TO 2.0 EULALIO AT 1.75 PSI/MIN IN CHAMBER #3-59 PT OBSERVED FOR FACIAL TWITCHING AND CHEST RISE BY HT SEATED CHAMBERSIDE PT ASCENDED FROM TX DEPTH OF 2.0 EULALIO @ 1.75 PSI/MIN  PT TOLERATED TREATMENT WOUND CARE AND DRESSING CHANGE TO BE DONE BY PT AT HOME WITH MATERIALS PROVIDED PT EXITED HYPERBARIC SUITE SAFELY ACCOMPANIED BY HT

## 2023-08-10 NOTE — PROCEDURE
[Outpatient] : Outpatient [Ambulatory] : Patient is ambulatory. [Cane] : cane [Patient demonstrated and verbalized proper technique for using air break mask] : Patient demonstrated and verbalized proper technique for using air break mask [Patient educated on the risks of SMOKING prior to HBOT with understanding] : Patient educated on the risks of SMOKING prior to HBOT with understanding [Patient educated on the risks of CONSUMING ALCOHOL prior to HBOT with understanding] : Patient educated on the risks of CONSUMING ALCOHOL prior to HBOT with understanding [100% Cotton] : 100% cotton [Empty all pockets] : empty all pockets [No hair oils, wigs, hairpieces, pins] : no hair oils, wigs, hairpieces, pins  [Pre tx medications] : pre tx medications  [No make-up, creams] : no make-up, creams  [No jewelry] : no jewelry  [No matches, cigarettes, lighters] : no matches, cigarettes, lighters  [Hearing aid removed] : hearing aid removed [Dentures removed] : dentures removed [Ground bracelet on pt's wrist] : ground bracelet on pt's wrist  [Contacts removed] : contacts removed  [Remove nail polish] : remove nail polish  [No reading material] : no reading material  [Bra, undergarments removed] : bra, undergarments removed  [No contraindicated dressings] : no contraindicated dressings [Ground Wire - VISUAL Verification - Intact/Free of Obstruction] : Ground Wire - VISUAL Verification - Intact/Free of Obstruction  [Ground Continuity - Verified < 1 ohm w/ Wrist Strap Channing] : Ground Continuity - Verified < 1 ohm w/ Wrist Strap Channing [Number: ___] : Number: [unfilled] [Diagnosis: ___] : Diagnosis: [unfilled] [____] : Post-Dive: Time - [unfilled] [___] : Post-Dive: Value - [unfilled] mg/dL [Clear all fields] : clear all fields [] : No [FreeTextEntry3] : 90 [FreeTextEntry5] : 2695 [FreeTextEntry7] : 2118 [[facility]] : 9866 [de-identified] : 8128 [de-identified] : 108mins

## 2023-08-11 NOTE — ED ADULT NURSE NOTE - NSFALLRISKINTERV_ED_ALL_ED
Assistance OOB with selected safe patient handling equipment if applicable/Communicate fall risk and risk factors to all staff, patient, and family/Provide visual cue: yellow wristband, yellow gown, etc/Reinforce activity limits and safety measures with patient and family/Toileting schedule using arm’s reach rule for commode and bathroom/Call bell, personal items and telephone in reach/Instruct patient to call for assistance before getting out of bed/chair/stretcher/Non-slip footwear applied when patient is off stretcher/[place] to call system/Physically safe environment - no spills, clutter or unnecessary equipment/Purposeful Proactive Rounding/Room/bathroom lighting operational, light cord in reach

## 2023-08-11 NOTE — ED PROVIDER NOTE - PHYSICAL EXAMINATION
Gen: Well appearing in NAD  Head: NC/AT  Neck: trachea midline  cv: tachycardic  Resp:  No distress, lungs clear  abd nontender   Ext: no deformities  Neuro:  A&O appears non focal  Skin:  Warm and dry as visualized  Psych:  Normal affect and mood

## 2023-08-11 NOTE — PHARMACOTHERAPY INTERVENTION NOTE - COMMENTS
Updated penicillin allergy field to reflect patient has tolerated meropenem and ceftriaxone in 04/2023, 06/2023 and this admission without any adverse events reported.

## 2023-08-11 NOTE — PATIENT PROFILE ADULT - FALL HARM RISK - RISK INTERVENTIONS

## 2023-08-11 NOTE — ED PROVIDER NOTE - OBJECTIVE STATEMENT
74yo F with PMHx IDDM2, HFrEF, HTN, hypothyroidism, LBBB, chronic lymphedema pw nausea and vomiting since this morning.  pt states whenever she has an infection it presents as vomiting, pt denies uri sx, cough, abd pain or urinary sx, pt was admitted in June with septic shock and Ecoli bacteremia sec Left pyelo with hydro- no obtructive uropathy on CT, was on pressors and given 10 days of rocephin, opt sattes at the time had no urinary sx 72yo F with PMHx IDDM2, HFrEF, HTN, hypothyroidism, LBBB, chronic lymphedema pw nausea and vomiting since this morning.  pt states whenever she has an infection it presents as vomiting, pt denies uri sx, cough, abd pain or urinary sx, does comlpain of chills. pt was admitted in June with septic shock and Ecoli bacteremia sec Left pyelo with hydro- no obtructive uropathy on CT, was on pressors and given 10 days of rocephin, pt sates at the time had no urinary sx

## 2023-08-11 NOTE — CONSULT NOTE ADULT - ASSESSMENT
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   -Check Urine lytes  -Check UA  -Trend lactic acid  -Give LR  -Hold Torsemide for now  -Goal sodium correction 6-8 meq in 24 hour period  -No acute indication for RRT  -May need urology consult

## 2023-08-11 NOTE — PROCEDURE
[Outpatient] : Outpatient [Ambulatory] : Patient is ambulatory. [Cane] : cane [THIS CHAMBER HAS BEEN CLEANED / DISINFECTED] : This chamber has been cleaned / disinfected according to local and hospital policy and procedure prior to this treatment. [Patient demonstrated and verbalized proper technique for using air break mask] : Patient demonstrated and verbalized proper technique for using air break mask [Patient educated on the risks of SMOKING prior to HBOT with understanding] : Patient educated on the risks of SMOKING prior to HBOT with understanding [Patient educated on the risks of CONSUMING ALCOHOL prior to HBOT with understanding] : Patient educated on the risks of CONSUMING ALCOHOL prior to HBOT with understanding [100% Cotton] : 100% cotton [Empty all pockets] : empty all pockets [No hair oils, wigs, hairpieces, pins] : no hair oils, wigs, hairpieces, pins  [Pre tx medications] : pre tx medications  [No make-up, creams] : no make-up, creams  [No jewelry] : no jewelry  [No matches, cigarettes, lighters] : no matches, cigarettes, lighters  [Hearing aid removed] : hearing aid removed [Dentures removed] : dentures removed [Ground bracelet on pt's wrist] : ground bracelet on pt's wrist  [Contacts removed] : contacts removed  [Remove nail polish] : remove nail polish  [No reading material] : no reading material  [Bra, undergarments removed] : bra, undergarments removed  [No contraindicated dressings] : no contraindicated dressings [Ground Wire - VISUAL Verification - Intact/Free of Obstruction] : Ground Wire - VISUAL Verification - Intact/Free of Obstruction  [Ground Continuity - Verified < 1 ohm w/ Wrist Strap Channing] : Ground Continuity - Verified < 1 ohm w/ Wrist Strap Channing [Number: ___] : Number: [unfilled] [Diagnosis: ___] : Diagnosis: [unfilled] [____] : Post-Dive: Time - [unfilled] [___] : Post-Dive: Value - [unfilled] mg/dL [Clear all fields] : clear all fields [] : No [FreeTextEntry3] : 90 [FreeTextEntry5] : 6076 [FreeTextEnkws1] : 2442 [FreeTextEntry9] : 9815 [de-identified] : 6141 [de-identified] : 108

## 2023-08-11 NOTE — H&P ADULT - HISTORY OF PRESENT ILLNESS
This is 72yo F with PMHx IDDM2, HFrEF, HTN, hypothyroidism, LBBB, chronic lymphedema pw nausea and vomiting since this morning.  Patient states whenever she has an infection it presents as vomiting. Patient denies uri sx, cough, abd pain or urinary sx. She does complain of chills. Patient was admitted in June with septic shock and Ecoli bacteremia 2/2 left pyelo with hydro- no obtructive uropathy on CT. She was on pressors and given 10 days of iv rocephin. Patient states at the time she had no urinary sx. In the ER today she had T 102.1.

## 2023-08-11 NOTE — CONSULT NOTE ADULT - ASSESSMENT
74yo F with PMHx IDDM2, HFrEF, HTN, hypothyroidism, LBBB, chronic lymphedema pw nausea and vomiting.  Pt states whenever she has an infection it presents as vomiting, pt denies uri sx, cough, abd pain or urinary sx, does comlpain of chills. Admission in June with septic shock and Ecoli bacteremia sec Left pyelo with hydro- no obtructive uropathy on CT, was on pressors and given 10 days of rocephin, At the time had no urinary sx  CT-Mild right hydroureteronephrosis and right perinephric stranding.     RECOMMENDATIONS  Suggesting infection and some evidence for urinary localization. Prior E. coli was pansensitive. Recommend  CEFTRIAXONE (started 8/11) with recs to follow    Thank you for consulting us and involving us in the management of this most interesting and challenging case.  We will follow along in the care of this patient. Please call us at [phone] or text me directly on my cell# at [phone] with any concerns.

## 2023-08-11 NOTE — H&P ADULT - PROBLEM SELECTOR PLAN 1
Admit  R/O sepsis  IV abx as per ID recs  Pan-culture  ID consult  Further work-up/management pending clinical course.

## 2023-08-11 NOTE — ED ADULT NURSE NOTE - OBJECTIVE STATEMENT
Pt states she developed nausea and vomiting today around 5am. Pt noted to have a fever and is actively vomiting. Pt medicated per orders. Pt appears uncomfortable. Pt denies SOB, CP abdominal pain and diarrhea. Pt and pt's family updated on plan of care.

## 2023-08-11 NOTE — PATIENT PROFILE ADULT - NSPROREFERSVCHOMEDIABETES_GEN_A_NUR
Thank you.  I told him that I figured you would be either calling him or sending him an electronic message.   no

## 2023-08-12 NOTE — CARE COORDINATION ASSESSMENT. - OTHER PERTINENT REFERRAL INFORMATION
CM met with patient and significant other (Peggi) at bedside to explain role and transitions of care. Patient lives with Peggi in a private house with 2 steps to get in and she does not uses the basement.  Patient was fully independent prior to admission.  No caregiver identified (self). No home care prior to admission.  Patient uses a quad cane.  Peggi will transport patient home when ready to be discharge.  No needs identified. Patient and significant other verbalized understanding of plans after discharge and are in agreement. All questions answered to the best of my abilities.  CM remains available throughout the hospital stay.

## 2023-08-12 NOTE — PROGRESS NOTE ADULT - ASSESSMENT
74yo F with PMHx IDDM2, HFrEF, HTN, hypothyroidism, LBBB, chronic lymphedema pw nausea and vomiting.  Pt states whenever she has an infection it presents as vomiting, pt denies uri sx, cough, abd pain or urinary sx, does comlpain of chills. Admission in June with septic shock and Ecoli bacteremia sec Left pyelo with hydro- no obtructive uropathy on CT, was on pressors and given 10 days of rocephin, At the time had no urinary sx  CT-Mild right hydroureteronephrosis and right perinephric stranding.   Found to have E coli bacteremia    RECOMMENDATIONS  Suggesting infection and some evidence for urinary localization Acute Pyelonephritis. Prior E. coli was pansensitive.   E coli BActeremia  Repeat blood cultures collected 8/11 early am in lab  Recommend  CEFTRIAXONE (started 8/11) with recs to follow    Thank you for consulting us and involving us in the management of this most interesting and challenging case.  We will follow along in the care of this patient. Please call us at [phone] or text me directly on my cell# at [phone] with any concerns.

## 2023-08-12 NOTE — PROVIDER CONTACT NOTE (CRITICAL VALUE NOTIFICATION) - SITUATION
Lab called with a critical lab of blood culture positive gram neg rods in aerobic and anaerobic bottles.

## 2023-08-12 NOTE — PROGRESS NOTE ADULT - ASSESSMENT
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   -Check Urine lytes  -Check UA  -Lactic acid improved  -Change LR to NS with low BP   -Hold Torsemide for now  -Goal sodium correction 6-8 meq in 24 hour period  -No acute indication for RRT  -May need urology consult  -Creatinine worsening  -Check bladder scan  -Strict I&O  -Check Renal US

## 2023-08-12 NOTE — CARE COORDINATION ASSESSMENT. - NSCAREPROVIDERS_GEN_ALL_CORE_FT
CARE PROVIDERS:  Accepting Physician: Luis Mcdermott  Admitting: Luis Mcdermott  Attending: Luis Mcdermott  Consultant: Dale Chisholm  Consultant: Liliya Mota  Consultant: Parmjit Edwards  Consultant: Jamil Denton  Covering Team: Darrin Linares  ED Attending: Tonya Luke  ED Nurse: Nina Momin  Emergency Medicine: Andrade Loyd  Nurse: Arlyn Jackson  Nurse: Skylar Allen  Ordered: ADM, User  Ordered: Doctor, Unknown  Override: Jamil Butler  Override: Toni Liang  PCA/Nursing Assistant: Analy Olea  PCA/Nursing Assistant: Modesto Rosen  Primary Team: Sarah Nagy  Primary Team: Adelso Araujo  Primary Team: Kiesha Bautista  Registered Dietitian: Lily Simeon

## 2023-08-12 NOTE — CONSULT NOTE ADULT - SUBJECTIVE AND OBJECTIVE BOX
[facility] DIVISION of INFECTIOUS DISEASE  Dale Chisholm MD PhD, Candy Cross MD, Felipa Rahman MD, Leeanna Gonzalez MD, Bill Dunn MD  and providing coverage with Robby Clark MD  Providing Infectious Disease Consultations at [facility], [facility], [facility], [facility]'s    Office# [phone] to schedule follow up appointments  Answering Service for urgent calls or New Consults [phone]  Cell# to text for urgent issues Dael Chisholm [phone]     HPI: 72yo F with PMHx IDDM2, HFrEF, HTN, hypothyroidism, LBBB, chronic lymphedema pw nausea and vomiting.  Pt states whenever she has an infection it presents as vomiting, pt denies uri sx, cough, abd pain or urinary sx, does comlpain of chills. Admission in June with septic shock and Ecoli bacteremia sec Left pyelo with hydro- no obtructive uropathy on CT, was on pressors and given 10 days of rocephin, At the time had no urinary sx    PAST MEDICAL & SURGICAL HISTORY:  Hypertension  Diabetes  Lymphedema  History of left bundle branch block (LBBB)  Systolic heart failure, chronic    H/O cataract  2020      History of surgical removal of meniscus of knee  left in 1971      Frozen shoulder  2000      H/O Achilles tendon repair  lengthened bilaterally, 2000      Fractured skull  1968          Antimicrobials      Immunological      Other      Allergies    penicillins (Hives)  Ativan (Rash; Urticaria)    Intolerances        SOCIAL HISTORY:  no toxic habits reported      FAMILY HISTORY:  Family history of CVA  mom, age 57        ROS:    EYES:  Negative  blurry vision or double vision  GASTROINTESTINAL:  Negative for nausea, vomiting, diarrhea  -otherwise negative except for subjective    Vital Signs Last 24 Hrs  T(C): 38.9 (11 Aug 2023 09:50), Max: 38.9 (11 Aug 2023 09:50)  T(F): 102.1 (11 Aug 2023 09:50), Max: 102.1 (11 Aug 2023 09:50)  HR: 113 (11 Aug 2023 08:50) (113 - 113)  BP: 175/95 (11 Aug 2023 08:50) (175/95 - 175/95)  BP(mean): --  RR: 22 (11 Aug 2023 08:50) (22 - 22)  SpO2: 92% (11 Aug 2023 08:50) (92% - 92%)    Parameters below as of 11 Aug 2023 08:50  Patient On (Oxygen Delivery Method): room air        PE:  In no distress, obese  HEENT:  NC, PERRL, sclerae anicteric, conjunctivae clear, EOMI.  Sinuses nontender, no nasal exudate.  No buccal or pharyngeal lesions, erythema or exudate  Neck:  Supple, no adenopathy  Lungs:  No accessory muscle use, bilaterally clear to auscultation  Cor:  distant  Abd:  Symmetric, normoactive BS.  Soft, nontender, no masses, guarding or rebound.  Liver and spleen not enlarged  Extrem:  No cyanosis, ;egs are wrapped  Neuro: grossly intact  Musc: moving all limbs freely, no focal deficits        LABS:                        11.0   11.77 )-----------( 255      ( 11 Aug 2023 09:40 )             35.0       WBC Count: 11.77 K/uL (08-11-23 @ 09:40)      08-11    134<L>  |  98  |  73<H>  ----------------------------<  155<H>  4.2   |  27  |  2.80<H>    Ca    8.8      11 Aug 2023 09:40    TPro  9.1<H>  /  Alb  3.5  /  TBili  0.7  /  DBili  x   /  AST  24  /  ALT  22  /  AlkPhos  87  08-11      Creatinine: 2.80 mg/dL (08-11-23 @ 09:40)      Urinalysis Basic - ( 11 Aug 2023 09:40 )    Color: x / Appearance: x / SG: x / pH: x  Gluc: 155 mg/dL / Ketone: x  / Bili: x / Urobili: x   Blood: x / Protein: x / Nitrite: x   Leuk Esterase: x / RBC: x / WBC x   Sq Epi: x / Non Sq Epi: x / Bacteria: x      MICROBIOLOGY:    Culture - Blood (06.04.23 @ 01:52)    -  Escherichia coli: Detec   -  Trimethoprim/Sulfamethoxazole: S <=0.5/9.5   -  Piperacillin/Tazobactam: S <=8   -  Tobramycin: S <=2   -  Ciprofloxacin: S <=0.25   -  Gentamicin: S <=2   -  Imipenem: S <=1   -  Levofloxacin: S <=0.5   -  Meropenem: S <=1   Gram Stain:   Growth in aerobic bottle: Gram Negative Rods  Growth in anaerobic bottle: Gram Negative Rods   -  Amikacin: S <=16   -  Ampicillin: S <=8 These ampicillin results predict results for amoxicillin   -  Ampicillin/Sulbactam: S <=4/2 Enterobacter, Klebsiella aerogenes, Citrobacter, and Serratia may develop resistance during prolonged therapy (3-4 days)   -  Aztreonam: S <=4   -  Cefazolin: S <=2 Enterobacter, Klebsiella aerogenes, Citrobacter, and Serratia may develop resistance during prolonged therapy (3-4 days)   -  Cefepime: S <=2   -  Cefoxitin: S <=8   -  Ceftriaxone: S <=1 Enterobacter, Klebsiella aerogenes, Citrobacter, and Serratia may develop resistance during prolonged therapy   -  Ertapenem: S <=0.5   Specimen Source: .Blood Blood-Peripheral   Organism: Blood Culture PCR   Organism: Escherichia coli   Culture Results:   Growth in aerobic and anaerobic bottles: Escherichia coli  ***Blood Panel PCR results on this specimen are available  approximately 3 hours after the Gram stain result.***  Gram stain, PCR, and/or culture results may not always  correspond due to difference in methodologies.  ************************************************************  This PCR assay was performed by multiplex PCR. This  Assay tests for 66 bacterial and resistance gene targets.  Please refer to the [facility] test directory  at [email or web address] for details.   Organism Identification: Blood Culture PCR  Escherichia coli   Method Type: PCR   Method Type: CATRACHITA        RADIOLOGY & ADDITIONAL STUDIES:    --< from: CT Abdomen and Pelvis No Cont (08.11.23 @ 10:41) >    ACC: [id number] EXAM:  CT ABDOMEN AND PELVIS   ORDERED BY: BRISA LOPEZ     PROCEDURE DATE:  08/11/2023          INTERPRETATION:  CLINICAL INFORMATION: Vomiting and fever    COMPARISON: 6/4/2023    CONTRAST/COMPLICATIONS:  IV Contrast: NONE  Oral Contrast: NONE  Complications: None reported at time of study completion    PROCEDURE:  CT of the Abdomen and Pelvis was performed.  Sagittal and coronal reformats were performed.    FINDINGS:  LOWER CHEST: Basilar atelectasis. Stable right lower lobe micronodules.   Coronary artery and aortic valve calcifications.    Please note that evaluation of the abdominal organs and vascular   structures is limited by lack of intravenous contrast.    LIVER: Within normal limits.  BILE DUCTS: Normal caliber.  GALLBLADDER: Within normal limits.  SPLEEN: Within normal limits. Probable splenules.  PANCREAS: Within normal limits.  ADRENALS: Within normal limits.  KIDNEYS/URETERS: Mild right hydroureteronephrosis and right perinephric   stranding. No renal or ureteral calculi identified.    BLADDER: Minimally distended.  REPRODUCTIVE ORGANS: Uterus and adnexa within normal limits.    BOWEL: No bowel obstruction. Appendix is within normal limits.  PERITONEUM: No ascites.  VESSELS: Atherosclerotic calcifications.  RETROPERITONEUM/LYMPH NODES: Prominent retroperitoneal lymph nodes.  ABDOMINAL WALL: Tiny fat-containing umbilical hernia. Postsurgical   changes.  BONES: Degenerative changes. Bilateral L5 spondylolysis. Grade 1   spondylolisthesis of L5 over S1.    IMPRESSION:  Mild right hydroureteronephrosis and right perinephric stranding.   Findings may represent passed right ureteral calculus or right   genitourinary tract infection.    
Patient is a 73y old  Female who presents with a chief complaint of n/v, fever (11 Aug 2023 15:59)       HPI:  This is 74yo F with PMHx IDDM2, HFrEF, HTN, hypothyroidism, LBBB, chronic lymphedema pw nausea and vomiting since this morning.  Patient states whenever she has an infection it presents as vomiting. Patient denies uri sx, cough, abd pain or urinary sx. She does complain of chills. Patient was admitted in June with septic shock and Ecoli bacteremia 2/2 left pyelo with hydro- no obtructive uropathy on CT. She was on pressors and given 10 days of iv rocephin. Patient states at the time she had no urinary sx. In the ER today she had T 102.1. (11 Aug 2023 13:20)  Follows with Dr. Wilde for nephrology.  + N/V.  NO SOB.  Urinating well       PAST MEDICAL & SURGICAL HISTORY:  Hypertension      Diabetes      Lymphedema      H/O left bundle branch block      History of left bundle branch block (LBBB)      Systolic heart failure, chronic      H/O cataract  2020      History of surgical removal of meniscus of knee  left in 1971      Frozen shoulder  2000      H/O Achilles tendon repair  lengthened bilaterally, 2000      Fractured skull  1968           FAMILY HISTORY:  Family history of CVA  mom, age 57    NC    Social History:Non smoker    MEDICATIONS  (STANDING):  aspirin enteric coated 81 milliGRAM(s) Oral daily  atorvastatin 80 milliGRAM(s) Oral at bedtime  cefTRIAXone   IVPB 2000 milliGRAM(s) IV Intermittent every 24 hours  dextrose 5%. 1000 milliLiter(s) (100 mL/Hr) IV Continuous <Continuous>  dextrose 5%. 1000 milliLiter(s) (50 mL/Hr) IV Continuous <Continuous>  dextrose 50% Injectable 25 Gram(s) IV Push once  dextrose 50% Injectable 12.5 Gram(s) IV Push once  dextrose 50% Injectable 25 Gram(s) IV Push once  glucagon  Injectable 1 milliGRAM(s) IntraMuscular once  heparin   Injectable 5000 Unit(s) SubCutaneous every 12 hours  insulin glargine Injectable (LANTUS) 37 Unit(s) SubCutaneous at bedtime  insulin lispro (ADMELOG) corrective regimen sliding scale   SubCutaneous three times a day before meals  insulin lispro (ADMELOG) corrective regimen sliding scale   SubCutaneous at bedtime  lactated ringers. 1000 milliLiter(s) (75 mL/Hr) IV Continuous <Continuous>  levothyroxine 75 MICROGram(s) Oral daily  metoprolol succinate  milliGRAM(s) Oral daily  torsemide 20 milliGRAM(s) Oral two times a day    MEDICATIONS  (PRN):  acetaminophen     Tablet .. 650 milliGRAM(s) Oral every 6 hours PRN Temp greater or equal to 38C (100.4F), Mild Pain (1 - 3)  aluminum hydroxide/magnesium hydroxide/simethicone Suspension 30 milliLiter(s) Oral every 4 hours PRN Dyspepsia  dextrose Oral Gel 15 Gram(s) Oral once PRN Blood Glucose LESS THAN 70 milliGRAM(s)/deciliter  melatonin 3 milliGRAM(s) Oral at bedtime PRN Insomnia  ondansetron Injectable 4 milliGRAM(s) IV Push every 6 hours PRN Nausea and/or Vomiting   Meds reviewed    Allergies    penicillins (Hives)  Ativan (Rash; Urticaria)    Intolerances         REVIEW OF SYSTEMS:    Review of Systems:   Constitutional: Denies fatigue  HEENT: Denies headaches and dizziness  Respiratory: denies SOB, cough, or wheezing  Cardiovascular: denies CP, palpitations  Gastrointestinal: +nausea, +vomiting,  denies diarrhea, constipation, abdominal pain, or bloody stools  Genitourinary: denies painful urination, increased frequency, urgency, or bloody urine  Skin: denies rashes or itching  Musculoskeletal: denies muscle aches, joint swelling  Neurologic: Denies generalized weakness, denies loss of sensation, numbness, or tingling      Vital Signs Last 24 Hrs  T(C): 36.7 (11 Aug 2023 21:35), Max: 38.9 (11 Aug 2023 09:50)  T(F): 98.1 (11 Aug 2023 21:35), Max: 102.1 (11 Aug 2023 09:50)  HR: 56 (11 Aug 2023 21:35) (56 - 113)  BP: 99/61 (11 Aug 2023 21:35) (99/61 - 175/95)  BP(mean): --  RR: 16 (11 Aug 2023 21:35) (16 - 22)  SpO2: 96% (11 Aug 2023 21:35) (92% - 100%)    Parameters below as of 11 Aug 2023 21:35  Patient On (Oxygen Delivery Method): room air      Daily Height in cm: 180.34 (11 Aug 2023 08:50)    Daily     PHYSICAL EXAM:    GENERAL: NAD  HEAD:  Atraumatic, Normocephalic  EYES: EOMI, conjunctiva and sclera clear  ENMT: No Drainage from nares, No drainage from ears  NECK: Supple, neck  veins full  NERVOUS SYSTEM:  Awake and Alert  CHEST/LUNG: Clear to percussion bilaterally; No rales, rhonchi, wheezing, or rubs  HEART: Regular rate and rhythm; No murmurs, rubs, or gallops  ABDOMEN: Soft, Nontender, Nondistended; Bowel sounds present  EXTREMITIES:  ++Edema  SKIN: No rashes No obvious ecchymosis      LABS:                        11.0   11.77 )-----------( 255      ( 11 Aug 2023 09:40 )             35.0     08-11    134<L>  |  98  |  73<H>  ----------------------------<  155<H>  4.2   |  27  |  2.80<H>    Ca    8.8      11 Aug 2023 09:40    TPro  9.1<H>  /  Alb  3.5  /  TBili  0.7  /  DBili  x   /  AST  24  /  ALT  22  /  AlkPhos  87  08-11    PT/INR - ( 11 Aug 2023 09:40 )   PT: 11.6 sec;   INR: 0.99 ratio         PTT - ( 11 Aug 2023 09:40 )  PTT:29.5 sec  Urinalysis Basic - ( 11 Aug 2023 09:40 )    Color: x / Appearance: x / SG: x / pH: x  Gluc: 155 mg/dL / Ketone: x  / Bili: x / Urobili: x   Blood: x / Protein: x / Nitrite: x   Leuk Esterase: x / RBC: x / WBC x   Sq Epi: x / Non Sq Epi: x / Bacteria: x              RADIOLOGY & ADDITIONAL TESTS:  
HPI:  This is 74yo F with PMHx IDDM2, HFrEF, HTN, hypothyroidism, LBBB, chronic lymphedema pw nausea and vomiting since this morning.  Patient states whenever she has an infection it presents as vomiting. Patient denies uri sx, cough, abd pain or urinary sx. She does complain of chills. Patient was admitted in June with septic shock and Ecoli bacteremia 2/2 left pyelo with hydro- no obtructive uropathy on CT. She was on pressors and given 10 days of iv rocephin. Patient states at the time she had no urinary sx. In the ER today she had T 102.1. (11 Aug 2023 13:20)      73y year old Female seen at [facility] [address] for ----------.  Denies any fever, chills, nausea, vomiting, chest pain, shortness of breath, or calf pain at this time.    REVIEW OF SYSTEMS    PAST MEDICAL & SURGICAL HISTORY:  Hypertension      Diabetes      Lymphedema      H/O left bundle branch block      History of left bundle branch block (LBBB)      Systolic heart failure, chronic      H/O cataract  2020      History of surgical removal of meniscus of knee  left in 1971      Frozen shoulder  2000      H/O Achilles tendon repair  lengthened bilaterally, 2000      Fractured skull  1968          Allergies    penicillins (Hives)  Ativan (Rash; Urticaria)    Intolerances        MEDICATIONS  (STANDING):  aspirin enteric coated 81 milliGRAM(s) Oral daily  atorvastatin 80 milliGRAM(s) Oral at bedtime  cefTRIAXone   IVPB 2000 milliGRAM(s) IV Intermittent every 24 hours  dextrose 5%. 1000 milliLiter(s) (100 mL/Hr) IV Continuous <Continuous>  dextrose 5%. 1000 milliLiter(s) (50 mL/Hr) IV Continuous <Continuous>  dextrose 50% Injectable 25 Gram(s) IV Push once  dextrose 50% Injectable 12.5 Gram(s) IV Push once  dextrose 50% Injectable 25 Gram(s) IV Push once  glucagon  Injectable 1 milliGRAM(s) IntraMuscular once  heparin   Injectable 5000 Unit(s) SubCutaneous every 12 hours  insulin glargine Injectable (LANTUS) 37 Unit(s) SubCutaneous at bedtime  insulin lispro (ADMELOG) corrective regimen sliding scale   SubCutaneous three times a day before meals  insulin lispro (ADMELOG) corrective regimen sliding scale   SubCutaneous at bedtime  lactated ringers. 1000 milliLiter(s) (75 mL/Hr) IV Continuous <Continuous>  levothyroxine 75 MICROGram(s) Oral daily  metoprolol succinate  milliGRAM(s) Oral daily    MEDICATIONS  (PRN):  acetaminophen     Tablet .. 650 milliGRAM(s) Oral every 6 hours PRN Temp greater or equal to 38C (100.4F), Mild Pain (1 - 3)  aluminum hydroxide/magnesium hydroxide/simethicone Suspension 30 milliLiter(s) Oral every 4 hours PRN Dyspepsia  dextrose Oral Gel 15 Gram(s) Oral once PRN Blood Glucose LESS THAN 70 milliGRAM(s)/deciliter  melatonin 3 milliGRAM(s) Oral at bedtime PRN Insomnia  ondansetron Injectable 4 milliGRAM(s) IV Push every 6 hours PRN Nausea and/or Vomiting      Social History:      FAMILY HISTORY:  Family history of CVA  mom, age 57        Vital Signs Last 24 Hrs  T(C): 37.1 (12 Aug 2023 04:40), Max: 38.9 (11 Aug 2023 09:50)  T(F): 98.7 (12 Aug 2023 04:40), Max: 102.1 (11 Aug 2023 09:50)  HR: 63 (12 Aug 2023 04:40) (56 - 78)  BP: 91/53 (12 Aug 2023 04:40) (91/53 - 130/74)  BP(mean): --  RR: 17 (12 Aug 2023 04:40) (16 - 18)  SpO2: 95% (12 Aug 2023 04:40) (95% - 100%)    Parameters below as of 12 Aug 2023 04:40  Patient On (Oxygen Delivery Method): room air        PHYSICAL EXAM:  Vascular: DP palpable and PT non palpable bilaterally, Capillary refill 3 seconds, Digital hair absent bilaterally  Neurological: Light touch sensation diminished bilaterally  Musculoskeletal: 5/5 strength in all quadrants bilaterally, AJ & STJ ROM intact  Dermatological: 1.0cm x 0.2 cm 0.4cm cm ulceration noted to the  granular wound bed with hyperkeratotic periwound border,  no probe to bone, no periwound erythema, no fluctuance, no malodor, no proximal streaking at this time    CBC Full  -  ( 12 Aug 2023 07:58 )  WBC Count : 12.57 K/uL  RBC Count : 3.21 M/uL  Hemoglobin : 9.2 g/dL  Hematocrit : 29.8 %  Platelet Count - Automated : 175 K/uL  Mean Cell Volume : 92.8 fl  Mean Cell Hemoglobin : 28.7 pg  Mean Cell Hemoglobin Concentration : 30.9 gm/dL  Auto Neutrophil # : 10.77 K/uL  Auto Lymphocyte # : 0.36 K/uL  Auto Monocyte # : 1.06 K/uL  Auto Eosinophil # : 0.19 K/uL  Auto Basophil # : 0.07 K/uL  Auto Neutrophil % : 85.6 %  Auto Lymphocyte % : 2.9 %  Auto Monocyte % : 8.4 %  Auto Eosinophil % : 1.5 %  Auto Basophil % : 0.6 %    08-11    134<L>  |  98  |  73<H>  ----------------------------<  155<H>  4.2   |  27  |  2.80<H>    Ca    8.8      11 Aug 2023 09:40  Phos  4.2     08-12  Mg     2.3     08-12    TPro  9.1<H>  /  Alb  3.5  /  TBili  0.7  /  DBili  x   /  AST  24  /  ALT  22  /  AlkPhos  87  08-11                            9.2    12.57 )-----------( 175      ( 12 Aug 2023 07:58 )             29.8       PT/INR - ( 11 Aug 2023 09:40 )   PT: 11.6 sec;   INR: 0.99 ratio         PTT - ( 11 Aug 2023 09:40 )  PTT:29.5 sec      Culture - Blood (collected 11 Aug 2023 09:45)  Source: .Blood Blood-Peripheral  Gram Stain (12 Aug 2023 00:51):    Growth in aerobic and anaerobic bottles: Gram Negative Rods  Preliminary Report (12 Aug 2023 00:51):    Growth in aerobic and anaerobic bottles: Gram Negative Rods    Culture - Blood (collected 11 Aug 2023 09:40)  Source: .Blood Blood-Peripheral  Gram Stain (12 Aug 2023 00:36):    Growth in aerobic bottle: Gram Negative Rods    Growth in anaerobic bottle: Gram Negative Rods  Preliminary Report (12 Aug 2023 00:36):    Growth in aerobic bottle: Gram Negative Rods    Direct identification is available within approximately 3-5    hours either by Blood Panel Multiplexed PCR or Direct    MALDI-TOF. Details: [email or web address]    Growth in anaerobic bottle: Gram Negative Rods  Organism: Blood Culture PCR (12 Aug 2023 00:34)  Organism: Blood Culture PCR (12 Aug 2023 00:34)        Imaging: ----------    1 / 1 Gunner Malik              Report date: 8/9/2023      View Order      (Report matches exam selected in Patient History pane)                     ACC: [id number] EXAM: MR FOOT RT ORDERED BY: NOAM ROSENBERG    PROCEDURE DATE: 08/07/2023        INTERPRETATION: RIGHT FOOT MRI    CLINICAL INFORMATION: Chronic ulcer at the ball of the right foot. Erosive changes of the first metatarsal on prior x-ray.  TECHNIQUE: Multiplanar, multisequence MRI was obtained of the RIGHT foot.  COMPARISON: Right foot x-ray 7/27/2023. Right foot MRI 6/4/2021.    FINDINGS:    LIGAMENTS AND CAPSULAR STRUCTURES: Lisfranc ligament is intact. Intact plantar plates in the lesser MTP joints.  MUSCLES AND TENDONS: Focal muscle edema within the abductor hallucis longus and lumbricals. Visualized portions of the flexor, extensor, and peroneal tendons are intact.  CARTILAGE AND SUBCHONDRAL BONE: Severe arthrosis of the first MTP and first interphalangeal joint. Moderate arthrosis throughout the midfoot.  SYNOVIUM/ JOINT FLUID: Trace fluid noted about the first MTP joint.  OSSEOUS ALIGNMENT: Tarsometatarsal alignment is maintained. Valgus angulation of the first MTP joint and varus angulation of the first interphalangeal joint.  BONE MARROW: Cortical erosive changes at the head of the first metatarsal which are progressed in severity when compared to prior MRI. There is mild associated subcortical low T1 and intermediate STIR signal. Cortical erosive changes of the base of the first proximal phalanx without STIR hyperintense signal. Marrow signal is otherwise maintained.  WEB SPACES: Unremarkable.  PERIPHERAL SOFT TISSUES: Small plantar soft tissue ulcer at the level of the head of the first metatarsal extending into the subcutaneous fat. No associated abscess. Plantar soft tissue defect with metallic foreign body at the level of the mid third and fourth metatarsals. Focus of subcutaneous edema along the dorsum of foot.    IMPRESSION:  1. Cortical erosive changes at the head of the first metatarsal and base of the first proximal phalanx, which are progressed in severity when compared to prior study. There is mild subcortical edema in the head of the first metatarsal which may be reactive and is less likely representative of acute osteomyelitis.  2. No evidence of acute septic arthritis.  3. Plantar soft tissue defects favored to correspond to sites of chronic ulceration.    --- End of Report ---       BRISA VOSS MD; Resident Radiologist  This document has been electronically signed.  GUNNER MALIK MD; Attending Radiologist  This document has been electronically signed. Aug 9 2023 10:52AM

## 2023-08-12 NOTE — CONSULT NOTE ADULT - PROBLEM SELECTOR RECOMMENDATION 9
Patient was seen and evaluated   Applied silver alginate and dry sterile dressing to the right foot   Wound with no signs of infection noted   Continue local wound care and offloading to the right foot   Patient to resume HBOT once discharged from the hospital

## 2023-08-13 NOTE — PROGRESS NOTE ADULT - ASSESSMENT
72yo F with PMHx IDDM2, HFrEF, HTN, hypothyroidism, LBBB, chronic lymphedema pw nausea and vomiting.  Pt states whenever she has an infection it presents as vomiting, pt denies uri sx, cough, abd pain or urinary sx, does comlpain of chills. Admission in June with septic shock and Ecoli bacteremia sec Left pyelo with hydro- no obtructive uropathy on CT, was on pressors and given 10 days of rocephin, At the time had no urinary sx  CT-Mild right hydroureteronephrosis and right perinephric stranding.   Found to have E coli bacteremia    RECOMMENDATIONS  Suggesting infection and some evidence for urinary localization Acute Pyelonephritis. Prior E. coli was pansensitive.   E coli Bacteremia-sens pending  Repeat blood cultures collected 8/12 early am in lab-NTD  Recommend  CEFTRIAXONE (started 8/11) continue for now but if repeat blood cultures remain NGTD can look at dc on oral abx if possible based on sensitivities     Thank you for consulting us and involving us in the management of this most interesting and challenging case.  We will follow along in the care of this patient. Please call us at [phone] or text me directly on my cell# at [phone] with any concerns.     72yo F with PMHx IDDM2, HFrEF, HTN, hypothyroidism, LBBB, chronic lymphedema pw nausea and vomiting.  Pt states whenever she has an infection it presents as vomiting, pt denies uri sx, cough, abd pain or urinary sx, does comlpain of chills. Admission in June with septic shock and Ecoli bacteremia sec Left pyelo with hydro- no obtructive uropathy on CT, was on pressors and given 10 days of rocephin, At the time had no urinary sx  CT-Mild right hydroureteronephrosis and right perinephric stranding.   Found to have E coli bacteremia    RECOMMENDATIONS  Suggesting infection and some evidence for urinary localization Acute Pyelonephritis. Prior E. coli was pansensitive.   E coli Bacteremia-sens pending  Repeat blood cultures collected 8/12 early am in lab-NTD  Recommend  CEFTRIAXONE (started 8/11) continue for now but if repeat blood cultures remain NGTD can look at dc on oral abx if possible based on sensitivities     Thank you for consulting us and involving us in the management of this most interesting and challenging case.  We will follow along in the care of this patient. Please call us at [phone] or text me directly on my cell# at [phone] with any concerns.    Starting tomorrow Dr Clark will be assuming care of this patient so please contact him with any questions, concerns or new micro data.

## 2023-08-13 NOTE — PROGRESS NOTE ADULT - ASSESSMENT
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   -Check Urine lytes  -Check UA  -Lactic acid improved  -Change LR to NS with low BP   -Hold Torsemide for now  -Goal sodium correction 6-8 meq in 24 hour period  -No acute indication for RRT  -May need urology consult  -Strict I&O  -Renal US - No hydronephrosis, with resolution of right hydronephrosis since the   prior CT.  -De La Garza placed for urinary retention, creatinine improving

## 2023-08-14 NOTE — PHYSICAL THERAPY INITIAL EVALUATION ADULT - ADDITIONAL COMMENTS
Patient lives with her family in a private house, 2 steps to enter. Patient reports being independent in ADLs and ambulated with a quad cane prior to admission. Pt has DME rolling walker, wheelchair, scooter, shower chair.

## 2023-08-14 NOTE — GOALS OF CARE CONVERSATION - ADVANCED CARE PLANNING - CONVERSATION DETAILS
Palliative care SW met with patient at bedside to discuss advanced care planning.  Reviewed patient's medical and social history as well as events leading to patient's hospitalization. Writer discussed patient's current diagnosis (Tubulointerstitial nephritis; HX of CHF HTN, hypothyroidism, LBBB, chronic lymphedema)  medical condition and management. Inquired about advanced directives.  Patient states she has HCP with Catrina Ladd as her health care agent. SW recommended she discuss her wishes with her health care agent. Patient showed good  insight into medical condition. All questions answered. Psychosocial support provided.

## 2023-08-14 NOTE — PROGRESS NOTE ADULT - ASSESSMENT
74yo F with PMHx IDDM2, HFrEF, HTN, hypothyroidism, LBBB, chronic lymphedema pw nausea and vomiting.  Pt states whenever she has an infection it presents as vomiting, pt denies uri sx, cough, abd pain or urinary sx, does comlpain of chills. Admission in June with septic shock and Ecoli bacteremia sec Left pyelo with hydro- no obtructive uropathy on CT, was on pressors and given 10 days of rocephin, At the time had no urinary sx  CT-Mild right hydroureteronephrosis and right perinephric stranding.   Found to have E coli bacteremia  ted    RECOMMENDATIONS  ecoli bacteremia again  6/7-- tte- no noted vegetation  ecoli is not a common cause of endocarditis -- but can happen  renal us no abscess   E coli Bacteremia-sens pending  Repeat blood cultures collected 8/12 neg to date   cont ceftriaxone day 4   wbc trending down  dc hardy if able   will need gu follow up   trend renal function

## 2023-08-14 NOTE — PHYSICAL THERAPY INITIAL EVALUATION ADULT - PATIENT PROFILE REVIEW, REHAB EVAL
PT orders received: ambulate as tolerated. Consult with RN Lyndsay, pt may participate in PT evaluation./yes

## 2023-08-14 NOTE — PHYSICAL THERAPY INITIAL EVALUATION ADULT - TRANSFER TRAINING, PT EVAL
Patient will perform sit<->stand transfer independently with rolling walker by 2 weeks to allow patient to get on/off toilet safely.

## 2023-08-14 NOTE — PHYSICAL THERAPY INITIAL EVALUATION ADULT - GAIT TRAINING, PT EVAL
Patient will ambulate 200 feet independently with rolling walker by 2 weeks to allow for increased independence in the community.

## 2023-08-14 NOTE — PHYSICAL THERAPY INITIAL EVALUATION ADULT - PERTINENT HX OF CURRENT PROBLEM, REHAB EVAL
Patient is a 73 year old with PMHx IDDM2, HFrEF, HTN, hypothyroidism, LBBB, chronic lymphedema pw nausea and vomiting.  Patient states whenever she has an infection it presents as vomiting. Patient denies uri sx, cough, abd pain or urinary sx. She does complain of chills. Patient was admitted in June with septic shock and Ecoli bacteremia 2/2 left pyelo with hydro- no obtructive uropathy on CT. She was on pressors and given 10 days of iv rocephin. Patient states at the time she had no urinary sx. In the ER she had T 102.1.

## 2023-08-14 NOTE — PROGRESS NOTE ADULT - ASSESSMENT
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 Renal US - No hydronephrosis, with resolution of right hydronephrosis since the prior CT. Urology consult pending   -Urine studies reviewed   -Lactic acid improved  -Hold Torsemide for now  -Goal sodium correction 6-8 meq in 24 hour period  -No acute indication for RRT  -De La Garza placed for urinary retention, DC De La Garza in AM  -S/p IVF      D/w Baldemar

## 2023-08-15 NOTE — PROGRESS NOTE ADULT - PROBLEM SELECTOR PLAN 8
Monitor BMP  Renal f/u  renal follow up
Monitor BMP  Renal f/u

## 2023-08-15 NOTE — PROGRESS NOTE ADULT - SUBJECTIVE AND OBJECTIVE BOX
[facility] DIVISION of INFECTIOUS DISEASE  Dale Chisholm MD PhD, Candy Cross MD, Felipa Rahman MD, Leeanna Gonzalez MD, Bill Dunn MD  and providing coverage with Robby Clark MD  Providing Infectious Disease Consultations at [facility], [facility], [facility], [facility]'s    Office# [phone] to schedule follow up appointments  Answering Service for urgent calls or New Consults [phone]  Cell# to text for urgent issues Dale Chisholm [phone]     infectious diseases progress note:    EVELYN LOPEZ is a 73y y. o. Female patient    Overnight and events of the last 24hrs reviewed    Allergies    penicillins (Hives)  Ativan (Rash; Urticaria)    Intolerances        ANTIBIOTICS/RELEVANT:  antimicrobials  cefTRIAXone   IVPB 2000 milliGRAM(s) IV Intermittent every 24 hours    immunologic:    OTHER:  acetaminophen     Tablet .. 650 milliGRAM(s) Oral every 6 hours PRN  aluminum hydroxide/magnesium hydroxide/simethicone Suspension 30 milliLiter(s) Oral every 4 hours PRN  aspirin enteric coated 81 milliGRAM(s) Oral daily  atorvastatin 80 milliGRAM(s) Oral at bedtime  dextrose 5%. 1000 milliLiter(s) IV Continuous <Continuous>  dextrose 5%. 1000 milliLiter(s) IV Continuous <Continuous>  dextrose 50% Injectable 25 Gram(s) IV Push once  dextrose 50% Injectable 12.5 Gram(s) IV Push once  dextrose 50% Injectable 25 Gram(s) IV Push once  dextrose Oral Gel 15 Gram(s) Oral once PRN  glucagon  Injectable 1 milliGRAM(s) IntraMuscular once  heparin   Injectable 5000 Unit(s) SubCutaneous every 12 hours  insulin glargine Injectable (LANTUS) 37 Unit(s) SubCutaneous at bedtime  insulin lispro (ADMELOG) corrective regimen sliding scale   SubCutaneous three times a day before meals  insulin lispro (ADMELOG) corrective regimen sliding scale   SubCutaneous at bedtime  levothyroxine 75 MICROGram(s) Oral daily  melatonin 3 milliGRAM(s) Oral at bedtime PRN  metoprolol succinate  milliGRAM(s) Oral daily  ondansetron Injectable 4 milliGRAM(s) IV Push every 6 hours PRN  sodium chloride 0.9%. 1000 milliLiter(s) IV Continuous <Continuous>  tamsulosin 0.4 milliGRAM(s) Oral at bedtime      Objective:  Vital Signs Last 24 Hrs  T(C): 36.9 (13 Aug 2023 12:37), Max: 37 (12 Aug 2023 21:09)  T(F): 98.4 (13 Aug 2023 12:37), Max: 98.6 (12 Aug 2023 21:09)  HR: 66 (13 Aug 2023 12:37) (64 - 70)  BP: 117/62 (13 Aug 2023 12:37) (90/55 - 117/62)  BP(mean): --  RR: 18 (13 Aug 2023 12:37) (18 - 18)  SpO2: 94% (13 Aug 2023 12:37) (93% - 95%)    Parameters below as of 13 Aug 2023 12:37  Patient On (Oxygen Delivery Method): room air        T(C): 36.9 (08-13-23 @ 12:37), Max: 37.3 (08-11-23 @ 18:11)  T(C): 36.9 (08-13-23 @ 12:37), Max: 38.9 (08-11-23 @ 09:50)  T(C): 36.9 (08-13-23 @ 12:37), Max: 38.9 (08-11-23 @ 09:50)    PHYSICAL EXAM:  HEENT: NC atraumatic  Neck: supple  Respiratory: no accessory muscle use, breathing comfortably  Cardiovascular: distant  Gastrointestinal: normal appearing, nondistended  Extremities: no clubbing, no cyanosis,        LABS:                          8.6    9.00  )-----------( 170      ( 13 Aug 2023 08:00 )             26.9       WBC  9.00 08-13 @ 08:00  12.57 08-12 @ 07:58  11.77 08-11 @ 09:40      08-13    132<L>  |  99  |  80<H>  ----------------------------<  160<H>  4.2   |  24  |  3.30<H>    Ca    8.1<L>      13 Aug 2023 08:00  Phos  3.9     08-13  Mg     2.3     08-13    TPro  7.2  /  Alb  2.6<L>  /  TBili  0.6  /  DBili  x   /  AST  15  /  ALT  16  /  AlkPhos  64  08-12      Creatinine: 3.30 mg/dL (08-13-23 @ 08:00)  Creatinine: 3.50 mg/dL (08-12-23 @ 07:58)  Creatinine: 2.80 mg/dL (08-11-23 @ 09:40)        Urinalysis Basic - ( 13 Aug 2023 08:00 )    Color: x / Appearance: x / SG: x / pH: x  Gluc: 160 mg/dL / Ketone: x  / Bili: x / Urobili: x   Blood: x / Protein: x / Nitrite: x   Leuk Esterase: x / RBC: x / WBC x   Sq Epi: x / Non Sq Epi: x / Bacteria: x            INFLAMMATORY MARKERS      MICROBIOLOGY:    Culture - Blood (08.12.23 @ 01:34)    Specimen Source: .Blood Blood-Peripheral   Culture Results:   No growth at 24 hours    Culture - Blood (08.11.23 @ 09:40)    -  Escherichia coli: Detec   Gram Stain:   Growth in aerobic bottle: Gram Negative Rods  Growth in anaerobic bottle: Gram Negative Rods   Specimen Source: .Blood Blood-Peripheral   Organism: Blood Culture PCR   Culture Results:   Growth in aerobic bottle: Gram Negative Rods  Direct identification is available within approximately 3-5  hours either by Blood Panel Multiplexed PCR or Direct  MALDI-TOF. Details: [email or web address]  Growth in anaerobic bottle: Gram Negative Rods   Organism Identification: Blood Culture PCR   Method Type: PCR        RADIOLOGY & ADDITIONAL STUDIES:  
[facility] DIV[facility] of INFECTIOUS DISEASE  Dale Chisholm MD PhD, Candy Cross MD, Felipa Rahman MD, Leeanna Gonzalez MD, Bill Dunn MD  and providing coverage with Robby Clark MD  Providing Infectious Disease Consultations at [facility], [facility], [facility], [facility]'s    Office# [phone] to schedule follow up appointments  Answering Service for urgent calls or New Consults [phone]  Cell# to text for urgent issues Dale Chisholm [phone]     infectious diseases progress note:    EVELYN LOPEZ is a 73y y. o. Female patient    Overnight and events of the last 24hrs reviewed    Allergies    penicillins (Hives)  Ativan (Rash; Urticaria)    Intolerances        ANTIBIOTICS/RELEVANT:  antimicrobials  cefTRIAXone   IVPB 2000 milliGRAM(s) IV Intermittent every 24 hours    immunologic:    OTHER:  acetaminophen     Tablet .. 650 milliGRAM(s) Oral every 6 hours PRN  aluminum hydroxide/magnesium hydroxide/simethicone Suspension 30 milliLiter(s) Oral every 4 hours PRN  aspirin enteric coated 81 milliGRAM(s) Oral daily  atorvastatin 80 milliGRAM(s) Oral at bedtime  dextrose 5%. 1000 milliLiter(s) IV Continuous <Continuous>  dextrose 5%. 1000 milliLiter(s) IV Continuous <Continuous>  dextrose 50% Injectable 25 Gram(s) IV Push once  dextrose 50% Injectable 12.5 Gram(s) IV Push once  dextrose 50% Injectable 25 Gram(s) IV Push once  dextrose Oral Gel 15 Gram(s) Oral once PRN  glucagon  Injectable 1 milliGRAM(s) IntraMuscular once  heparin   Injectable 5000 Unit(s) SubCutaneous every 12 hours  insulin glargine Injectable (LANTUS) 37 Unit(s) SubCutaneous at bedtime  insulin lispro (ADMELOG) corrective regimen sliding scale   SubCutaneous three times a day before meals  insulin lispro (ADMELOG) corrective regimen sliding scale   SubCutaneous at bedtime  levothyroxine 75 MICROGram(s) Oral daily  melatonin 3 milliGRAM(s) Oral at bedtime PRN  metoprolol succinate  milliGRAM(s) Oral daily  ondansetron Injectable 4 milliGRAM(s) IV Push every 6 hours PRN  sodium chloride 0.9%. 1000 milliLiter(s) IV Continuous <Continuous>      Objective:  Vital Signs Last 24 Hrs  T(C): 36.4 (12 Aug 2023 12:12), Max: 37.3 (11 Aug 2023 18:11)  T(F): 97.5 (12 Aug 2023 12:12), Max: 99.1 (11 Aug 2023 18:11)  HR: 61 (12 Aug 2023 12:12) (56 - 78)  BP: 99/58 (12 Aug 2023 12:12) (91/53 - 130/74)  BP(mean): --  RR: 18 (12 Aug 2023 12:12) (16 - 18)  SpO2: 94% (12 Aug 2023 12:12) (94% - 100%)    Parameters below as of 12 Aug 2023 12:12  Patient On (Oxygen Delivery Method): room air        T(C): 36.4 (23 @ 12:12), Max: 38.9 (23 @ 09:50)  T(C): 36.4 (23 @ 12:12), Max: 38.9 (23 @ 09:50)  T(C): 36.4 (23 @ 12:12), Max: 38.9 (23 @ 09:50)    PHYSICAL EXAM:  HEENT: NC atraumatic  Neck: supple  Respiratory: no accessory muscle use, breathing comfortably  Cardiovascular: distant  Gastrointestinal: normal appearing, nondistended  Extremities: no clubbing, no cyanosis,        LABS:                          9.2    12.57 )-----------( 175      ( 12 Aug 2023 07:58 )             29.8       WBC  12.57  @ 07:58  11.77  @ 09:40      0812    131<L>  |  96  |  83<H>  ----------------------------<  200<H>  4.3   |  27  |  3.50<H>    Ca    8.6      12 Aug 2023 07:58  Phos  4.2       Mg     2.3         TPro  7.2  /  Alb  2.6<L>  /  TBili  0.6  /  DBili  x   /  AST  15  /  ALT  16  /  AlkPhos  64        Creatinine: 3.50 mg/dL (23 @ 07:58)  Creatinine: 2.80 mg/dL (23 @ 09:40)      PT/INR - ( 11 Aug 2023 09:40 )   PT: 11.6 sec;   INR: 0.99 ratio         PTT - ( 11 Aug 2023 09:40 )  PTT:29.5 sec  Urinalysis Basic - ( 12 Aug 2023 12:00 )    Color: Yellow / Appearance: Cloudy / S.013 / pH: x  Gluc: x / Ketone: Negative mg/dL  / Bili: Negative / Urobili: 0.2 mg/dL   Blood: x / Protein: 100 mg/dL / Nitrite: Negative   Leuk Esterase: Large / RBC: 50 /HPF / WBC Too Numerous to count /HPF   Sq Epi: x / Non Sq Epi: x / Bacteria: Few /HPF            INFLAMMATORY MARKERS      MICROBIOLOGY:    Culture - Blood (23 @ 09:40)    -  Escherichia coli: Detec   Gram Stain:   Growth in aerobic bottle: Gram Negative Rods  Growth in anaerobic bottle: Gram Negative Rods   Specimen Source: .Blood Blood-Peripheral   Organism: Blood Culture PCR   Culture Results:   Growth in aerobic bottle: Gram Negative Rods  Direct identification is available within approximately 3-5  hours either by Blood Panel Multiplexed PCR or Direct  MALDI-TOF. Details: [email or web address]  Growth in anaerobic bottle: Gram Negative Rods   Organism Identification: Blood Culture PCR   Method Type: PCR        RADIOLOGY & ADDITIONAL STUDIES:  
Patient is a 73y old  Female who presents with a chief complaint of n/v, fever (11 Aug 2023 15:59)       HPI:  This is 74yo F with PMHx IDDM2, HFrEF, HTN, hypothyroidism, LBBB, chronic lymphedema pw nausea and vomiting since this morning.  Patient states whenever she has an infection it presents as vomiting. Patient denies uri sx, cough, abd pain or urinary sx. She does complain of chills. Patient was admitted in June with septic shock and Ecoli bacteremia 2/2 left pyelo with hydro- no obtructive uropathy on CT. She was on pressors and given 10 days of iv rocephin. Patient states at the time she had no urinary sx. In the ER today she had T 102.1. (11 Aug 2023 13:20)  Follows with Dr. Wilde for nephrology.  + N/V.  NO SOB.  Urinating well     No N/V/SOB.  Urinating per patient    PAST MEDICAL & SURGICAL HISTORY:  Hypertension      Diabetes      Lymphedema      H/O left bundle branch block      History of left bundle branch block (LBBB)      Systolic heart failure, chronic      H/O cataract  2020      History of surgical removal of meniscus of knee  left in 1971      Frozen shoulder  2000      H/O Achilles tendon repair  lengthened bilaterally, 2000      Fractured skull  1968           FAMILY HISTORY:  Family history of CVA  mom, age 57    NC    Social History:Non smoker    MEDICATIONS  (STANDING):  aspirin enteric coated 81 milliGRAM(s) Oral daily  atorvastatin 80 milliGRAM(s) Oral at bedtime  cefTRIAXone   IVPB 2000 milliGRAM(s) IV Intermittent every 24 hours  dextrose 5%. 1000 milliLiter(s) (100 mL/Hr) IV Continuous <Continuous>  dextrose 5%. 1000 milliLiter(s) (50 mL/Hr) IV Continuous <Continuous>  dextrose 50% Injectable 25 Gram(s) IV Push once  dextrose 50% Injectable 12.5 Gram(s) IV Push once  dextrose 50% Injectable 25 Gram(s) IV Push once  glucagon  Injectable 1 milliGRAM(s) IntraMuscular once  heparin   Injectable 5000 Unit(s) SubCutaneous every 12 hours  insulin glargine Injectable (LANTUS) 37 Unit(s) SubCutaneous at bedtime  insulin lispro (ADMELOG) corrective regimen sliding scale   SubCutaneous three times a day before meals  insulin lispro (ADMELOG) corrective regimen sliding scale   SubCutaneous at bedtime  lactated ringers. 1000 milliLiter(s) (75 mL/Hr) IV Continuous <Continuous>  levothyroxine 75 MICROGram(s) Oral daily  metoprolol succinate  milliGRAM(s) Oral daily  torsemide 20 milliGRAM(s) Oral two times a day    MEDICATIONS  (PRN):  acetaminophen     Tablet .. 650 milliGRAM(s) Oral every 6 hours PRN Temp greater or equal to 38C (100.4F), Mild Pain (1 - 3)  aluminum hydroxide/magnesium hydroxide/simethicone Suspension 30 milliLiter(s) Oral every 4 hours PRN Dyspepsia  dextrose Oral Gel 15 Gram(s) Oral once PRN Blood Glucose LESS THAN 70 milliGRAM(s)/deciliter  melatonin 3 milliGRAM(s) Oral at bedtime PRN Insomnia  ondansetron Injectable 4 milliGRAM(s) IV Push every 6 hours PRN Nausea and/or Vomiting   Meds reviewed    Allergies    penicillins (Hives)  Ativan (Rash; Urticaria)    Intolerances         REVIEW OF SYSTEMS:    Review of Systems:   as above      Vital Signs Last 24 Hrs  T(C): 37.1 (14 Aug 2023 12:55), Max: 37.5 (14 Aug 2023 05:07)  T(F): 98.8 (14 Aug 2023 12:55), Max: 99.5 (14 Aug 2023 05:07)  HR: 71 (14 Aug 2023 12:55) (66 - 75)  BP: 121/70 (14 Aug 2023 12:55) (105/56 - 121/70)  BP(mean): --  RR: 17 (14 Aug 2023 12:55) (17 - 20)  SpO2: 95% (14 Aug 2023 12:55) (93% - 95%)    Parameters below as of 14 Aug 2023 12:55  Patient On (Oxygen Delivery Method): room air          PHYSICAL EXAM:    GENERAL: NAD  HEAD:  Atraumatic, Normocephalic  EYES: EOMI, conjunctiva and sclera clear  ENMT: No Drainage from nares, No drainage from ears  NECK: Supple, neck  veins full  NERVOUS SYSTEM:  Awake and Alert  CHEST/LUNG: Clear to percussion bilaterally; No rales, rhonchi, wheezing, or rubs  HEART: Regular rate and rhythm; No murmurs, rubs, or gallops  ABDOMEN: Soft, Nontender, Nondistended; Bowel sounds present  EXTREMITIES:  ++Edema  SKIN: No rashes No obvious ecchymosis      LABS:                        9.4    7.59  )-----------( 193      ( 14 Aug 2023 07:53 )             29.4     08-14    133<L>  |  102  |  76<H>  ----------------------------<  142<H>  4.2   |  23  |  3.20<H>    Ca    8.4<L>      14 Aug 2023 07:53  Phos  3.9     08-13  Mg     2.5     08-14        Urinalysis Basic - ( 14 Aug 2023 07:53 )    Color: x / Appearance: x / SG: x / pH: x  Gluc: 142 mg/dL / Ketone: x  / Bili: x / Urobili: x   Blood: x / Protein: x / Nitrite: x   Leuk Esterase: x / RBC: x / WBC x   Sq Epi: x / Non Sq Epi: x / Bacteria: x                  
     EVELYN LOPEZ is a 73yFemale , patient examined and chart reviewed.     INTERVAL HPI/ OVERNIGHT EVENTS:   Feels well. Afebrile.  In chair. NAD.    PAST MEDICAL & SURGICAL HISTORY:  Hypertension  Diabetes  Lymphedema  H/O left bundle branch block  History of left bundle branch block (LBBB)  Systolic heart failure, chronic  H/O cataract  2020  History of surgical removal of meniscus of knee  left in 1971  Frozen shoulder  2000  H/O Achilles tendon repair  lengthened bilaterally, 2000  Fractured skull  1968      For details regarding the patient's social history, family history, and other miscellaneous elements, please refer the initial infectious diseases consultation and/or the admitting history and physical examination for this admission.    ROS:  CONSTITUTIONAL:  Negative fever or chills  EYES:  Negative  blurry vision or double vision  CARDIOVASCULAR:  Negative for chest pain or palpitations  RESPIRATORY:  Negative for cough, wheezing, or SOB   GASTROINTESTINAL:  Negative for nausea, vomiting, diarrhea, constipation, or abdominal pain  GENITOURINARY:  Negative frequency, urgency or dysuria  NEUROLOGIC:  No headache, confusion, dizziness, lightheadedness  All other systems were reviewed and are negative     ALLERGIES  penicillins (Hives)  Ativan (Rash; Urticaria)      Current inpatient medications :    ANTIBIOTICS/RELEVANT:  cefTRIAXone   IVPB 2000 milliGRAM(s) IV Intermittent every 24 hours  lactobacillus acidophilus 1 Tablet(s) Oral daily      acetaminophen     Tablet .. 650 milliGRAM(s) Oral every 6 hours PRN  aluminum hydroxide/magnesium hydroxide/simethicone Suspension 30 milliLiter(s) Oral every 4 hours PRN  aspirin enteric coated 81 milliGRAM(s) Oral daily  atorvastatin 80 milliGRAM(s) Oral at bedtime  dextrose 5%. 1000 milliLiter(s) IV Continuous <Continuous>  dextrose 5%. 1000 milliLiter(s) IV Continuous <Continuous>  dextrose 50% Injectable 25 Gram(s) IV Push once  dextrose 50% Injectable 12.5 Gram(s) IV Push once  dextrose 50% Injectable 25 Gram(s) IV Push once  dextrose Oral Gel 15 Gram(s) Oral once PRN  glucagon  Injectable 1 milliGRAM(s) IntraMuscular once  heparin   Injectable 5000 Unit(s) SubCutaneous every 8 hours  insulin glargine Injectable (LANTUS) 37 Unit(s) SubCutaneous at bedtime  insulin lispro (ADMELOG) corrective regimen sliding scale   SubCutaneous three times a day before meals  insulin lispro (ADMELOG) corrective regimen sliding scale   SubCutaneous at bedtime  levothyroxine 75 MICROGram(s) Oral daily  melatonin 3 milliGRAM(s) Oral at bedtime PRN  metoprolol succinate  milliGRAM(s) Oral daily  ondansetron Injectable 4 milliGRAM(s) IV Push every 6 hours PRN  tamsulosin 0.4 milliGRAM(s) Oral at bedtime      Objective:    08-14 @ 07:01  -  08-15 @ 07:00  --------------------------------------------------------  IN: 0 mL / OUT: 800 mL / NET: -800 mL      T(C): 36.6 (08-15-23 @ 12:32), Max: 37.4 (08-15-23 @ 04:39)  HR: 67 (08-15-23 @ 12:32) (67 - 87)  BP: 149/64 (08-15-23 @ 12:32) (116/71 - 149/64)  RR: 18 (08-15-23 @ 12:32) (17 - 18)  SpO2: 95% (08-15-23 @ 12:32) (94% - 96%)      Physical Exam:  General no acute distress  Neck: supple, trachea midline  Lungs: clear, no wheeze/rhonchi  Cardiovascular: regular rate and rhythm, S1 S2  Abdomen: soft, nontender,  bowel sounds normal  Neurological: alert and oriented x3  Skin: no rash  Extremities: no edema      LABS:                          9.8    6.29  )-----------( 237      ( 15 Aug 2023 11:48 )             30.1       08-15    135  |  103  |  67<H>  ----------------------------<  115<H>  4.1   |  26  |  3.00<H>    Ca    8.2<L>      15 Aug 2023 07:00  Mg     2.5     08-14    TPro  6.7  /  Alb  2.3<L>  /  TBili  0.3  /  DBili  x   /  AST  9<L>  /  ALT  12  /  AlkPhos  58  08-15        Urinalysis Basic - ( 15 Aug 2023 07:00 )    Color: x / Appearance: x / SG: x / pH: x  Gluc: 115 mg/dL / Ketone: x  / Bili: x / Urobili: x   Blood: x / Protein: x / Nitrite: x   Leuk Esterase: x / RBC: x / WBC x   Sq Epi: x / Non Sq Epi: x / Bacteria: x      MICROBIOLOGY:  Culture - Urine (08.12.23 @ 12:00)    Specimen Source: Clean Catch Clean Catch (Midstream)   Culture Results:   <10,000 CFU/mL Normal Urogenital Radha    Culture - Blood (08.12.23 @ 01:34)    Specimen Source: .Blood Blood-Peripheral   Culture Results:   No growth at 72 Hours    Culture - Blood (08.11.23 @ 09:40)    -  Escherichia coli: Detec   Gram Stain:   Growth in aerobic bottle: Gram Negative Rods  Growth in anaerobic bottle: Gram Negative Rods   -  Amikacin: S <=16   -  Ampicillin: S <=8 These ampicillin results predict results for amoxicillin   -  Ampicillin/Sulbactam: S <=4/2 Enterobacter, Klebsiella aerogenes, Citrobacter, and Serratia may develop resistance during prolonged therapy (3-4 days)   -  Aztreonam: S <=4   -  Cefazolin: S <=2 Enterobacter, Klebsiella aerogenes, Citrobacter, and Serratia may develop resistance during prolonged therapy (3-4 days)   -  Cefepime: S <=2   -  Cefoxitin: S <=8   -  Ceftriaxone: S <=1 Enterobacter, Klebsiella aerogenes, Citrobacter, and Serratia may develop resistance during prolonged therapy   -  Ciprofloxacin: S <=0.25   -  Ertapenem: S <=0.5   -  Gentamicin: S <=2   -  Imipenem: S <=1   -  Levofloxacin: S <=0.5   -  Meropenem: S <=1   -  Piperacillin/Tazobactam: S <=8   -  Trimethoprim/Sulfamethoxazole: S <=0.5/9.5   -  Tobramycin: S <=2   Specimen Source: .Blood Blood-Peripheral   Organism: Blood Culture PCR   Organism: Escherichia coli   Culture Results:   Growth in aerobic and anaerobic bottles: Escherichia coli      RADIOLOGY & ADDITIONAL STUDIES:    ACC: [id number] EXAM:  CT ABDOMEN AND PELVIS   ORDERED BY: BRISA LOPEZ     PROCEDURE DATE:  08/11/2023          INTERPRETATION:  CLINICAL INFORMATION: Vomiting and fever    COMPARISON: 6/4/2023    CONTRAST/COMPLICATIONS:  IV Contrast: NONE  Oral Contrast: NONE  Complications: None reported at time of study completion    PROCEDURE:  CT of the Abdomen and Pelvis was performed.  Sagittal and coronal reformats were performed.    FINDINGS:  LOWER CHEST: Basilar atelectasis. Stable right lower lobe micronodules.   Coronary artery and aortic valve calcifications.    Please note that evaluation of the abdominal organs and vascular   structures is limited by lack of intravenous contrast.    LIVER: Within normal limits.  BILE DUCTS: Normal caliber.  GALLBLADDER: Within normal limits.  SPLEEN: Within normal limits. Probable splenules.  PANCREAS: Within normal limits.  ADRENALS: Within normal limits.  KIDNEYS/URETERS: Mild right hydroureteronephrosis and right perinephric   stranding. No renal or ureteral calculi identified.    BLADDER: Minimally distended.  REPRODUCTIVE ORGANS: Uterus and adnexa within normal limits.    BOWEL: No bowel obstruction. Appendix is within normal limits.  PERITONEUM: No ascites.  VESSELS: Atherosclerotic calcifications.  RETROPERITONEUM/LYMPH NODES: Prominent retroperitoneal lymph nodes.  ABDOMINAL WALL: Tiny fat-containing umbilical hernia. Postsurgical   changes.  BONES: Degenerative changes. Bilateral L5 spondylolysis. Grade 1   spondylolisthesis of L5 over S1.    IMPRESSION:  Mild right hydroureteronephrosis and right perinephric stranding.   Findings may represent passed right ureteral calculus or right   genitourinary tract infection.        Assessment :  74yo F with PMHx IDDM2, HFrEF, HTN, hypothyroidism, LBBB, chronic lymphedema pw nausea and vomiting.  Pt states whenever she has an infection it presents as vomiting, pt denies uri sx, cough, abd pain or urinary sx, does comlpain of chills. Admission in June with septic shock and Ecoli bacteremia sec Left pyelo with hydro- no obtructive uropathy on CT, was on pressors and given 10 days of rocephin, At the time had no urinary sx  CT-Mild right hydroureteronephrosis and right perinephric stranding.   Found to have E coli bacteremia likely sec right pyelo sec passed stone  DUNCAN CKD  ecoli bacteremia again  6/7-- tte- no noted vegetation  ecoli is not a common cause of endocarditis -- but can happen  renal us no abscess   Repeat blood cultures collected 8/12 neg to date   [place] dc'd  wbc wnl    Plan:  On Ceftriaxone day 5  Can change to po Cipro 250mg daily x 10 days for dc   will need gu follow up   trend renal function   Stable from ID standpoint    D/w Dr Mcdermott     Continue with present regiment.  Appropriate use of antibiotics and adverse effects reviewed.      I have discussed the above plan of care with patient in detail. She expressed understanding of the  treatment plan . Risks, benefits and alternatives discussed in detail. I have asked if she has any questions or concerns and appropriately addressed them to the best of my ability .    > 35 minutes were spent in direct patient care reviewing notes, medications ,labs data/ imaging , discussion with multidisciplinary team.    Thank you for allowing me to participate in care of your patient .    Robby Clark MD  Infectious Disease  [phone]
[facility], Division of Infectious   Dr Chisholm, Dr Cross, Dr Rahman, ANA Montero Shaun  [phone]  after hours and weekends [phone]    Name: EVELYN LOPEZ  Age: 73y  Gender: Female  MRN: [id number]    Interval History--  Notes reviewed  up in chair  no events  no complaints       Allergies    penicillins (Hives)  Ativan (Rash; Urticaria)    Intolerances        Medications--  Antibiotics:  cefTRIAXone   IVPB 2000 milliGRAM(s) IV Intermittent every 24 hours    Immunologic:    Other:  acetaminophen     Tablet .. PRN  aluminum hydroxide/magnesium hydroxide/simethicone Suspension PRN  aspirin enteric coated  atorvastatin  dextrose 5%.  dextrose 5%.  dextrose 50% Injectable  dextrose 50% Injectable  dextrose 50% Injectable  dextrose Oral Gel PRN  glucagon  Injectable  heparin   Injectable  insulin glargine Injectable (LANTUS)  insulin lispro (ADMELOG) corrective regimen sliding scale  insulin lispro (ADMELOG) corrective regimen sliding scale  lactobacillus acidophilus  levothyroxine  melatonin PRN  metoprolol succinate ER  ondansetron Injectable PRN  sodium chloride 0.9%.  tamsulosin      Review of Systems--  A 10-point review of systems was obtained.     Pertinent positives and negatives--  Constitutional: No fevers. No Chills. No Rigors.   Cardiovascular: No chest pain. No palpitations.  Respiratory: No shortness of breath. No cough.  Gastrointestinal: No nausea or vomiting. No diarrhea or constipation.   Psychiatric: Pleasant. Appropriate affect.    Review of systems otherwise negative except as previously noted.    Physical Examination--  Vital Signs: T(F): 98.8 (08-14-23 @ 12:55), Max: 99.5 (08-14-23 @ 05:07)  HR: 71 (08-14-23 @ 12:55)  BP: 121/70 (08-14-23 @ 12:55)  RR: 17 (08-14-23 @ 12:55)  SpO2: 95% (08-14-23 @ 12:55)  Wt(kg): --  General: Nontoxic-appearing Female in no acute distress.  HEENT: AT/NC.  Neck: Not rigid. No sense of mass.  Nodes: None palpable.  Lungs: Clear bilaterally without rales, wheezing or rhonchi  Heart: Regular rate and rhythm. No Murmur. No rub. No gallop. No palpable thrill.  Abdomen: Bowel sounds present and normoactive. Soft. Nondistended.   Extremities: No cyanosis or clubbing. + edema.   Skin: Warm. Dry. Good turgor. No rash. No vasculitic stigmata.  Psychiatric: Appropriate affect and mood for situation.   [facility]         Laboratory Studies--  CBC                        9.4    7.59  )-----------( 193      ( 14 Aug 2023 07:53 )             29.4       Chemistries  08-14    133<L>  |  102  |  76<H>  ----------------------------<  142<H>  4.2   |  23  |  3.20<H>    Ca    8.4<L>      14 Aug 2023 07:53  Phos  3.9     08-13  Mg     2.5     08-14        Culture Data    Culture - Urine (collected 12 Aug 2023 12:00)  Source: Clean Catch Clean Catch (Midstream)  Final Report (13 Aug 2023 14:35):    <10,000 CFU/mL Normal Urogenital Radha    Culture - Blood (collected 12 Aug 2023 01:34)  Source: .Blood Blood-Peripheral  Preliminary Report (14 Aug 2023 12:01):    No growth at 48 Hours    Culture - Blood (collected 12 Aug 2023 01:34)  Source: .Blood Blood-Peripheral  Preliminary Report (14 Aug 2023 12:01):    No growth at 48 Hours    Culture - Blood (collected 11 Aug 2023 09:45)  Source: .Blood Blood-Peripheral  Gram Stain (12 Aug 2023 00:51):    Growth in aerobic and anaerobic bottles: Gram Negative Rods  Final Report (13 Aug 2023 18:30):    Growth in aerobic and anaerobic bottles: Escherichia coli    See previous culture [id number]    Culture - Blood (collected 11 Aug 2023 09:40)  Source: .Blood Blood-Peripheral  Gram Stain (12 Aug 2023 00:36):    Growth in aerobic bottle: Gram Negative Rods    Growth in anaerobic bottle: Gram Negative Rods  Final Report (13 Aug 2023 17:09):    Growth in aerobic and anaerobic bottles: Escherichia coli    Direct identification is available within approximately 3-5    hours either by Blood Panel Multiplexed PCR or Direct    MALDI-TOF. Details: [email or web address]  Organism: Blood Culture PCR  Escherichia coli (13 Aug 2023 17:09)  Organism: Escherichia coli (13 Aug 2023 17:09)  Organism: Blood Culture PCR (13 Aug 2023 17:09)        < from: CT Abdomen and Pelvis No Cont (08.11.23 @ 10:41) >  PROCEDURE:  CT of the Abdomen and Pelvis was performed.  Sagittal and coronal reformats were performed.    FINDINGS:  LOWER CHEST: Basilar atelectasis. Stable right lower lobe micronodules.   Coronary artery and aortic valve calcifications.    Please note that evaluation of the abdominal organs and vascular   structures is limited by lack of intravenous contrast.    LIVER: Within normal limits.  BILE DUCTS: Normal caliber.  GALLBLADDER: Within normal limits.  SPLEEN: Within normal limits. Probable splenules.  PANCREAS: Within normal limits.  ADRENALS: Within normal limits.  KIDNEYS/URETERS: Mild right hydroureteronephrosis and right perinephric   stranding. No renal or ureteral calculi identified.    BLADDER: Minimally distended.  REPRODUCTIVE ORGANS: Uterus and adnexa within normal limits.    BOWEL: No bowel obstruction. Appendix is within normal limits.  PERITONEUM: No ascites.  VESSELS: Atherosclerotic calcifications.  RETROPERITONEUM/LYMPH NODES: Prominent retroperitoneal lymph nodes.  ABDOMINAL WALL: Tiny fat-containing umbilical hernia. Postsurgical   changes.  BONES: Degenerative changes. Bilateral L5 spondylolysis. Grade 1   spondylolisthesis of L5 over S1.    IMPRESSION:  Mild right hydroureteronephrosis and right perinephric stranding.   Findings may represent passed right ureteral calculus or right   genitourinary tract infection.    < end of copied text >  < from: US Renal (08.12.23 @ 11:04) >  ACC: [id number] EXAM:  US KIDNEY(S)   ORDERED BY: JORDYN BUSH     PROCEDURE DATE:  08/12/2023          INTERPRETATION:  CLINICAL INFORMATION: Acute kidney injury. Right   hydronephrosis on recent CT imaging.    COMPARISON: CT abdomen and pelvis 8/11/2023.    TECHNIQUE: Sonography of the kidneys.    FINDINGS:  Right kidney: 11.6 cm. No hydronephrosis. No renal mass or calculus   visualized.    Left kidney: Limited visualization. 10.0 cm. No hydronephrosis.    Urinary bladder: Not imaged.    IMPRESSION:  No hydronephrosis, with resolution of right hydronephrosis since the   prior CT.      < end of copied text >      
Patient is a 73y old  Female who presents with a chief complaint of n/v, fever (11 Aug 2023 15:59)       HPI:  This is 72yo F with PMHx IDDM2, HFrEF, HTN, hypothyroidism, LBBB, chronic lymphedema pw nausea and vomiting since this morning.  Patient states whenever she has an infection it presents as vomiting. Patient denies uri sx, cough, abd pain or urinary sx. She does complain of chills. Patient was admitted in June with septic shock and Ecoli bacteremia 2/2 left pyelo with hydro- no obtructive uropathy on CT. She was on pressors and given 10 days of iv rocephin. Patient states at the time she had no urinary sx. In the ER today she had T 102.1. (11 Aug 2023 13:20)  Follows with Dr. Wilde for nephrology.  + N/V.  NO SOB.  Urinating well     No N/V/SOB.  Urinating per patient    PAST MEDICAL & SURGICAL HISTORY:  Hypertension      Diabetes      Lymphedema      H/O left bundle branch block      History of left bundle branch block (LBBB)      Systolic heart failure, chronic      H/O cataract  2020      History of surgical removal of meniscus of knee  left in 1971      Frozen shoulder  2000      H/O Achilles tendon repair  lengthened bilaterally, 2000      Fractured skull  1968           FAMILY HISTORY:  Family history of CVA  mom, age 57    [place]    Social History:Non smoker    MEDICATIONS  (STANDING):  aspirin enteric coated 81 milliGRAM(s) Oral daily  atorvastatin 80 milliGRAM(s) Oral at bedtime  cefTRIAXone   IVPB 2000 milliGRAM(s) IV Intermittent every 24 hours  dextrose 5%. 1000 milliLiter(s) (100 mL/Hr) IV Continuous <Continuous>  dextrose 5%. 1000 milliLiter(s) (50 mL/Hr) IV Continuous <Continuous>  dextrose 50% Injectable 25 Gram(s) IV Push once  dextrose 50% Injectable 12.5 Gram(s) IV Push once  dextrose 50% Injectable 25 Gram(s) IV Push once  glucagon  Injectable 1 milliGRAM(s) IntraMuscular once  heparin   Injectable 5000 Unit(s) SubCutaneous every 12 hours  insulin glargine Injectable (LANTUS) 37 Unit(s) SubCutaneous at bedtime  insulin lispro (ADMELOG) corrective regimen sliding scale   SubCutaneous three times a day before meals  insulin lispro (ADMELOG) corrective regimen sliding scale   SubCutaneous at bedtime  lactated ringers. 1000 milliLiter(s) (75 mL/Hr) IV Continuous <Continuous>  levothyroxine 75 MICROGram(s) Oral daily  metoprolol succinate  milliGRAM(s) Oral daily  torsemide 20 milliGRAM(s) Oral two times a day    MEDICATIONS  (PRN):  acetaminophen     Tablet .. 650 milliGRAM(s) Oral every 6 hours PRN Temp greater or equal to 38C (100.4F), Mild Pain (1 - 3)  aluminum hydroxide/magnesium hydroxide/simethicone Suspension 30 milliLiter(s) Oral every 4 hours PRN Dyspepsia  dextrose Oral Gel 15 Gram(s) Oral once PRN Blood Glucose LESS THAN 70 milliGRAM(s)/deciliter  melatonin 3 milliGRAM(s) Oral at bedtime PRN Insomnia  ondansetron Injectable 4 milliGRAM(s) IV Push every 6 hours PRN Nausea and/or Vomiting   Meds reviewed    Allergies    penicillins (Hives)  Ativan (Rash; Urticaria)    Intolerances         REVIEW OF SYSTEMS:    Review of Systems:   as above      ICU Vital Signs Last 24 Hrs  T(C): 37.1 (12 Aug 2023 04:40), Max: 37.3 (11 Aug 2023 18:11)  T(F): 98.7 (12 Aug 2023 04:40), Max: 99.1 (11 Aug 2023 18:11)  HR: 63 (12 Aug 2023 04:40) (56 - 78)  BP: 91/53 (12 Aug 2023 04:40) (91/53 - 130/74)  BP(mean): --  ABP: --  ABP(mean): --  RR: 17 (12 Aug 2023 04:40) (16 - 18)  SpO2: 95% (12 Aug 2023 04:40) (95% - 100%)    O2 Parameters below as of 12 Aug 2023 04:40  Patient On (Oxygen Delivery Method): room air            PHYSICAL EXAM:    GENERAL: NAD  HEAD:  Atraumatic, Normocephalic  EYES: EOMI, conjunctiva and sclera clear  ENMT: No Drainage from nares, No drainage from ears  NECK: Supple, neck  veins full  NERVOUS SYSTEM:  Awake and Alert  CHEST/LUNG: Clear to percussion bilaterally; No rales, rhonchi, wheezing, or rubs  HEART: Regular rate and rhythm; No murmurs, rubs, or gallops  ABDOMEN: Soft, Nontender, Nondistended; Bowel sounds present  EXTREMITIES:  ++Edema  SKIN: No rashes No obvious ecchymosis      LABS:                                   9.2    12.57 )-----------( 175      ( 12 Aug 2023 07:58 )             29.8     08-12    131<L>  |  96  |  83<H>  ----------------------------<  200<H>  4.3   |  27  |  3.50<H>    Ca    8.6      12 Aug 2023 07:58  Phos  4.2     08-12  Mg     2.3     08-12    TPro  7.2  /  Alb  2.6<L>  /  TBili  0.6  /  DBili  x   /  AST  15  /  ALT  16  /  AlkPhos  64  08-12    PT/INR - ( 11 Aug 2023 09:40 )   PT: 11.6 sec;   INR: 0.99 ratio         PTT - ( 11 Aug 2023 09:40 )  PTT:29.5 sec  Urinalysis Basic - ( 12 Aug 2023 07:58 )    Color: x / Appearance: x / SG: x / pH: x  Gluc: 200 mg/dL / Ketone: x  / Bili: x / Urobili: x   Blood: x / Protein: x / Nitrite: x   Leuk Esterase: x / RBC: x / WBC x   Sq Epi: x / Non Sq Epi: x / Bacteria: x        
Patient is a 73y old  Female who presents with a chief complaint of n/v, fever (11 Aug 2023 15:59)       HPI:  This is 72yo F with PMHx IDDM2, HFrEF, HTN, hypothyroidism, LBBB, chronic lymphedema pw nausea and vomiting since this morning.  Patient states whenever she has an infection it presents as vomiting. Patient denies uri sx, cough, abd pain or urinary sx. She does complain of chills. Patient was admitted in June with septic shock and Ecoli bacteremia 2/2 left pyelo with hydro- no obtructive uropathy on CT. She was on pressors and given 10 days of iv rocephin. Patient states at the time she had no urinary sx. In the ER today she had T 102.1. (11 Aug 2023 13:20)  Follows with Dr. Wilde for nephrology.  + N/V.  NO SOB.  Urinating well     No N/V/SOB.  Urinating per patient    PAST MEDICAL & SURGICAL HISTORY:  Hypertension      Diabetes      Lymphedema      H/O left bundle branch block      History of left bundle branch block (LBBB)      Systolic heart failure, chronic      H/O cataract  2020      History of surgical removal of meniscus of knee  left in 1971      Frozen shoulder  2000      H/O Achilles tendon repair  lengthened bilaterally, 2000      Fractured skull  1968           FAMILY HISTORY:  Family history of CVA  mom, age 57    [place]    Social History:Non smoker    MEDICATIONS  (STANDING):  aspirin enteric coated 81 milliGRAM(s) Oral daily  atorvastatin 80 milliGRAM(s) Oral at bedtime  cefTRIAXone   IVPB 2000 milliGRAM(s) IV Intermittent every 24 hours  dextrose 5%. 1000 milliLiter(s) (100 mL/Hr) IV Continuous <Continuous>  dextrose 5%. 1000 milliLiter(s) (50 mL/Hr) IV Continuous <Continuous>  dextrose 50% Injectable 25 Gram(s) IV Push once  dextrose 50% Injectable 12.5 Gram(s) IV Push once  dextrose 50% Injectable 25 Gram(s) IV Push once  glucagon  Injectable 1 milliGRAM(s) IntraMuscular once  heparin   Injectable 5000 Unit(s) SubCutaneous every 12 hours  insulin glargine Injectable (LANTUS) 37 Unit(s) SubCutaneous at bedtime  insulin lispro (ADMELOG) corrective regimen sliding scale   SubCutaneous three times a day before meals  insulin lispro (ADMELOG) corrective regimen sliding scale   SubCutaneous at bedtime  lactated ringers. 1000 milliLiter(s) (75 mL/Hr) IV Continuous <Continuous>  levothyroxine 75 MICROGram(s) Oral daily  metoprolol succinate  milliGRAM(s) Oral daily  torsemide 20 milliGRAM(s) Oral two times a day    MEDICATIONS  (PRN):  acetaminophen     Tablet .. 650 milliGRAM(s) Oral every 6 hours PRN Temp greater or equal to 38C (100.4F), Mild Pain (1 - 3)  aluminum hydroxide/magnesium hydroxide/simethicone Suspension 30 milliLiter(s) Oral every 4 hours PRN Dyspepsia  dextrose Oral Gel 15 Gram(s) Oral once PRN Blood Glucose LESS THAN 70 milliGRAM(s)/deciliter  melatonin 3 milliGRAM(s) Oral at bedtime PRN Insomnia  ondansetron Injectable 4 milliGRAM(s) IV Push every 6 hours PRN Nausea and/or Vomiting   Meds reviewed    Allergies    penicillins (Hives)  Ativan (Rash; Urticaria)    Intolerances         REVIEW OF SYSTEMS:    Review of Systems:   as above      ICU Vital Signs Last 24 Hrs  T(C): 36.6 (15 Aug 2023 12:32), Max: 37.4 (15 Aug 2023 04:39)  T(F): 97.8 (15 Aug 2023 12:32), Max: 99.3 (15 Aug 2023 04:39)  HR: 67 (15 Aug 2023 12:32) (67 - 87)  BP: 149/64 (15 Aug 2023 12:32) (116/71 - 149/64)  BP(mean): --  ABP: --  ABP(mean): --  RR: 18 (15 Aug 2023 12:32) (17 - 18)  SpO2: 95% (15 Aug 2023 12:32) (94% - 96%)    O2 Parameters below as of 15 Aug 2023 12:32  Patient On (Oxygen Delivery Method): room air                PHYSICAL EXAM:    GENERAL: NAD  HEAD:  Atraumatic, Normocephalic  EYES: EOMI, conjunctiva and sclera clear  ENMT: No Drainage from nares, No drainage from ears  NECK: Supple, neck  veins full  NERVOUS SYSTEM:  Awake and Alert  CHEST/LUNG: Clear to percussion bilaterally; No rales, rhonchi, wheezing, or rubs  HEART: Regular rate and rhythm; No murmurs, rubs, or gallops  ABDOMEN: Soft, Nontender, Nondistended; Bowel sounds present  EXTREMITIES:  ++Edema  SKIN: No rashes No obvious ecchymosis      LABS:                                   9.8    6.29  )-----------( 237      ( 15 Aug 2023 11:48 )             30.1     08-15    135  |  103  |  67<H>  ----------------------------<  115<H>  4.1   |  26  |  3.00<H>    Ca    8.2<L>      15 Aug 2023 07:00  Mg     2.5     08-14    TPro  6.7  /  Alb  2.3<L>  /  TBili  0.3  /  DBili  x   /  AST  9<L>  /  ALT  12  /  AlkPhos  58  08-15      Urinalysis Basic - ( 15 Aug 2023 07:00 )    Color: x / Appearance: x / SG: x / pH: x  Gluc: 115 mg/dL / Ketone: x  / Bili: x / Urobili: x   Blood: x / Protein: x / Nitrite: x   Leuk Esterase: x / RBC: x / WBC x   Sq Epi: x / Non Sq Epi: x / Bacteria: x                        
Patient is a 73y old  Female who presents with a chief complaint of n/v, fever (11 Aug 2023 15:59)       HPI:  This is 72yo F with PMHx IDDM2, HFrEF, HTN, hypothyroidism, LBBB, chronic lymphedema pw nausea and vomiting since this morning.  Patient states whenever she has an infection it presents as vomiting. Patient denies uri sx, cough, abd pain or urinary sx. She does complain of chills. Patient was admitted in June with septic shock and Ecoli bacteremia 2/2 left pyelo with hydro- no obtructive uropathy on CT. She was on pressors and given 10 days of iv rocephin. Patient states at the time she had no urinary sx. In the ER today she had T 102.1. (11 Aug 2023 13:20)  Follows with Dr. Wilde for nephrology.  + N/V.  NO SOB.  Urinating well     No N/V/SOB.  Urinating per patient    PAST MEDICAL & SURGICAL HISTORY:  Hypertension      Diabetes      Lymphedema      H/O left bundle branch block      History of left bundle branch block (LBBB)      Systolic heart failure, chronic      H/O cataract  2020      History of surgical removal of meniscus of knee  left in 1971      Frozen shoulder  2000      H/O Achilles tendon repair  lengthened bilaterally, 2000      Fractured skull  1968           FAMILY HISTORY:  Family history of CVA  mom, age 57    [place]    Social History:Non smoker    MEDICATIONS  (STANDING):  aspirin enteric coated 81 milliGRAM(s) Oral daily  atorvastatin 80 milliGRAM(s) Oral at bedtime  cefTRIAXone   IVPB 2000 milliGRAM(s) IV Intermittent every 24 hours  dextrose 5%. 1000 milliLiter(s) (100 mL/Hr) IV Continuous <Continuous>  dextrose 5%. 1000 milliLiter(s) (50 mL/Hr) IV Continuous <Continuous>  dextrose 50% Injectable 25 Gram(s) IV Push once  dextrose 50% Injectable 12.5 Gram(s) IV Push once  dextrose 50% Injectable 25 Gram(s) IV Push once  glucagon  Injectable 1 milliGRAM(s) IntraMuscular once  heparin   Injectable 5000 Unit(s) SubCutaneous every 12 hours  insulin glargine Injectable (LANTUS) 37 Unit(s) SubCutaneous at bedtime  insulin lispro (ADMELOG) corrective regimen sliding scale   SubCutaneous three times a day before meals  insulin lispro (ADMELOG) corrective regimen sliding scale   SubCutaneous at bedtime  lactated ringers. 1000 milliLiter(s) (75 mL/Hr) IV Continuous <Continuous>  levothyroxine 75 MICROGram(s) Oral daily  metoprolol succinate  milliGRAM(s) Oral daily  torsemide 20 milliGRAM(s) Oral two times a day    MEDICATIONS  (PRN):  acetaminophen     Tablet .. 650 milliGRAM(s) Oral every 6 hours PRN Temp greater or equal to 38C (100.4F), Mild Pain (1 - 3)  aluminum hydroxide/magnesium hydroxide/simethicone Suspension 30 milliLiter(s) Oral every 4 hours PRN Dyspepsia  dextrose Oral Gel 15 Gram(s) Oral once PRN Blood Glucose LESS THAN 70 milliGRAM(s)/deciliter  melatonin 3 milliGRAM(s) Oral at bedtime PRN Insomnia  ondansetron Injectable 4 milliGRAM(s) IV Push every 6 hours PRN Nausea and/or Vomiting   Meds reviewed    Allergies    penicillins (Hives)  Ativan (Rash; Urticaria)    Intolerances         REVIEW OF SYSTEMS:    Review of Systems:   as above      ICU Vital Signs Last 24 Hrs  T(C): 36.8 (13 Aug 2023 10:12), Max: 37 (12 Aug 2023 21:09)  T(F): 98.3 (13 Aug 2023 10:12), Max: 98.6 (12 Aug 2023 21:09)  HR: 67 (13 Aug 2023 10:12) (61 - 70)  BP: 90/55 (13 Aug 2023 10:12) (90/55 - 108/48)  BP(mean): --  ABP: --  ABP(mean): --  RR: 18 (13 Aug 2023 10:12) (18 - 18)  SpO2: 94% (13 Aug 2023 10:12) (93% - 95%)    O2 Parameters below as of 13 Aug 2023 10:12  Patient On (Oxygen Delivery Method): room air        PHYSICAL EXAM:    GENERAL: NAD  HEAD:  Atraumatic, Normocephalic  EYES: EOMI, conjunctiva and sclera clear  ENMT: No Drainage from nares, No drainage from ears  NECK: Supple, neck  veins full  NERVOUS SYSTEM:  Awake and Alert  CHEST/LUNG: Clear to percussion bilaterally; No rales, rhonchi, wheezing, or rubs  HEART: Regular rate and rhythm; No murmurs, rubs, or gallops  ABDOMEN: Soft, Nontender, Nondistended; Bowel sounds present  EXTREMITIES:  ++Edema  SKIN: No rashes No obvious ecchymosis      LABS:                                              8.6    9.00  )-----------( 170      ( 13 Aug 2023 08:00 )             26.9     08-13    132<L>  |  99  |  80<H>  ----------------------------<  160<H>  4.2   |  24  |  3.30<H>    Ca    8.1<L>      13 Aug 2023 08:00  Phos  3.9     08-13  Mg     2.3     08-13    TPro  7.2  /  Alb  2.6<L>  /  TBili  0.6  /  DBili  x   /  AST  15  /  ALT  16  /  AlkPhos  64  08-12      Urinalysis Basic - ( 13 Aug 2023 08:00 )    Color: x / Appearance: x / SG: x / pH: x  Gluc: 160 mg/dL / Ketone: x  / Bili: x / Urobili: x   Blood: x / Protein: x / Nitrite: x   Leuk Esterase: x / RBC: x / WBC x   Sq Epi: x / Non Sq Epi: x / Bacteria: x            
Date of Service: 08-12-23 @ 12:02    Patient is a 73y old  Female who presents with a chief complaint of n/v, fever (12 Aug 2023 09:55)      INTERVAL HPI/OVERNIGHT EVENTS: Patient seen and examined. NAD. No complaints. Feeling better.    Vital Signs Last 24 Hrs  T(C): 37.1 (12 Aug 2023 04:40), Max: 37.3 (11 Aug 2023 18:11)  T(F): 98.7 (12 Aug 2023 04:40), Max: 99.1 (11 Aug 2023 18:11)  HR: 63 (12 Aug 2023 04:40) (56 - 78)  BP: 91/53 (12 Aug 2023 04:40) (91/53 - 130/74)  BP(mean): --  RR: 17 (12 Aug 2023 04:40) (16 - 18)  SpO2: 95% (12 Aug 2023 04:40) (95% - 100%)    Parameters below as of 12 Aug 2023 04:40  Patient On (Oxygen Delivery Method): room air        08-12    131<L>  |  96  |  83<H>  ----------------------------<  200<H>  4.3   |  27  |  3.50<H>    Ca    8.6      12 Aug 2023 07:58  Phos  4.2     08-12  Mg     2.3     08-12    TPro  7.2  /  Alb  2.6<L>  /  TBili  0.6  /  DBili  x   /  AST  15  /  ALT  16  /  AlkPhos  64  08-12                          9.2    12.57 )-----------( 175      ( 12 Aug 2023 07:58 )             29.8     PT/INR - ( 11 Aug 2023 09:40 )   PT: 11.6 sec;   INR: 0.99 ratio         PTT - ( 11 Aug 2023 09:40 )  PTT:29.5 sec  CAPILLARY BLOOD GLUCOSE      POCT Blood Glucose.: 177 mg/dL (12 Aug 2023 07:32)  POCT Blood Glucose.: 231 mg/dL (11 Aug 2023 22:26)  POCT Blood Glucose.: 188 mg/dL (11 Aug 2023 18:58)  POCT Blood Glucose.: 166 mg/dL (11 Aug 2023 17:16)    Urinalysis Basic - ( 12 Aug 2023 07:58 )    Color: x / Appearance: x / SG: x / pH: x  Gluc: 200 mg/dL / Ketone: x  / Bili: x / Urobili: x   Blood: x / Protein: x / Nitrite: x   Leuk Esterase: x / RBC: x / WBC x   Sq Epi: x / Non Sq Epi: x / Bacteria: x    Culture - Blood (08.11.23 @ 09:40)   - Escherichia coli: Detec  Gram Stain:   Growth in aerobic bottle: Gram Negative Rods   Growth in anaerobic bottle: Gram Negative Rods  Specimen Source: .Blood Blood-Peripheral  Organism: Blood Culture PCR  Culture Results:   Growth in aerobic bottle: Gram Negative Rods   Direct identification is available within approximately 3-5   hours either by Blood Panel Multiplexed PCR or Direct   MALDI-TOF. Details: [email or web address]   Growth in anaerobic bottle: Gram Negative Rods  Organism Identification: Blood Culture PCR  Method Type: PCR    Culture - Blood (08.11.23 @ 09:45)   Gram Stain:   Growth in aerobic and anaerobic bottles: Gram Negative Rods  Specimen Source: .Blood Blood-Peripheral  Culture Results:   Growth in aerobic and anaerobic bottles: Gram Negative Rods      Historical Values  Culture - Blood (08.11.23 @ 09:45)   Gram Stain:   Growth in aerobic and anaerobic bottles: Gram Negative Rods  Specimen Source: .Blood Blood-Peripheral  Culture Results:   Growth in aerobic and anaerobic bottles: Gram Negative Rods          acetaminophen     Tablet .. 650 milliGRAM(s) Oral every 6 hours PRN  aluminum hydroxide/magnesium hydroxide/simethicone Suspension 30 milliLiter(s) Oral every 4 hours PRN  aspirin enteric coated 81 milliGRAM(s) Oral daily  atorvastatin 80 milliGRAM(s) Oral at bedtime  cefTRIAXone   IVPB 2000 milliGRAM(s) IV Intermittent every 24 hours  dextrose 5%. 1000 milliLiter(s) IV Continuous <Continuous>  dextrose 5%. 1000 milliLiter(s) IV Continuous <Continuous>  dextrose 50% Injectable 25 Gram(s) IV Push once  dextrose 50% Injectable 12.5 Gram(s) IV Push once  dextrose 50% Injectable 25 Gram(s) IV Push once  dextrose Oral Gel 15 Gram(s) Oral once PRN  glucagon  Injectable 1 milliGRAM(s) IntraMuscular once  heparin   Injectable 5000 Unit(s) SubCutaneous every 12 hours  insulin glargine Injectable (LANTUS) 37 Unit(s) SubCutaneous at bedtime  insulin lispro (ADMELOG) corrective regimen sliding scale   SubCutaneous three times a day before meals  insulin lispro (ADMELOG) corrective regimen sliding scale   SubCutaneous at bedtime  levothyroxine 75 MICROGram(s) Oral daily  melatonin 3 milliGRAM(s) Oral at bedtime PRN  metoprolol succinate  milliGRAM(s) Oral daily  ondansetron Injectable 4 milliGRAM(s) IV Push every 6 hours PRN  sodium chloride 0.9%. 1000 milliLiter(s) IV Continuous <Continuous>                  REVIEW OF SYSTEMS:  CONSTITUTIONAL: No fever, no weight loss, or no fatigue  NECK: No pain, no stiffness  RESPIRATORY: No cough, no wheezing, no chills, no hemoptysis, No shortness of breath  CARDIOVASCULAR: No chest pain, no palpitations, no dizziness, no leg swelling  GASTROINTESTINAL: No abdominal pain. No nausea, no vomiting, no hematemesis; No diarrhea, no constipation. No melena, no hematochezia.  GENITOURINARY: No dysuria, no frequency, no hematuria, no incontinence  NEUROLOGICAL: No headaches, no loss of strength, no numbness, no tremors  SKIN: No itching, no burning  MUSCULOSKELETAL: No joint pain, no swelling; No muscle, no back, no extremity pain  PSYCHIATRIC: No depression, no mood swings,   HEME/LYMPH: No easy bruising, no bleeding gums  ALLERY AND IMMUNOLOGIC: No hives       Consultant(s) Notes Reviewed:  [X] YES  [ ] NO    PHYSICAL EXAM:  GENERAL: NAD  HEAD:  Atraumatic, Normocephalic  EYES: EOMI, PERRLA, conjunctiva and sclera clear  ENMT: No tonsillar erythema, exudates, or enlargement; Moist mucous membranes  NECK: Supple, No JVD  NERVOUS SYSTEM:  Awake & alert  CHEST/LUNG: Clear to auscultation bilaterally; No rales, rhonchi, wheezing,  HEART: Regular rate and rhythm  ABDOMEN: Soft, Nontender, Nondistended; Bowel sounds present  EXTREMITIES:  No clubbing, cyanosis, or edema  LYMPH: No lymphadenopathy noted  SKIN: No rashes      Advanced care planning discussed with patient/family [X] YES   [ ] NO    Advanced care planning discussed with patient/family. Patient's health status was discussed. All appropriate changes have been made regarding patient's end-of-life care. Advanced care planning forms reviewed/discussed/completed.  20 minutes spent.   
Date of Service: 08-13-23 @ 12:10    Patient is a 73y old  Female who presents with a chief complaint of n/v, fever (13 Aug 2023 10:38)      INTERVAL HPI/OVERNIGHT EVENTS: Patient seen and examined. NAD. No complaints.    Vital Signs Last 24 Hrs  T(C): 36.8 (13 Aug 2023 10:12), Max: 37 (12 Aug 2023 21:09)  T(F): 98.3 (13 Aug 2023 10:12), Max: 98.6 (12 Aug 2023 21:09)  HR: 67 (13 Aug 2023 10:12) (61 - 70)  BP: 90/55 (13 Aug 2023 10:12) (90/55 - 108/48)  BP(mean): --  RR: 18 (13 Aug 2023 10:12) (18 - 18)  SpO2: 94% (13 Aug 2023 10:12) (93% - 95%)    Parameters below as of 13 Aug 2023 10:12  Patient On (Oxygen Delivery Method): room air        08-13    132<L>  |  99  |  80<H>  ----------------------------<  160<H>  4.2   |  24  |  3.30<H>    Ca    8.1<L>      13 Aug 2023 08:00  Phos  3.9     08-13  Mg     2.3     08-13    TPro  7.2  /  Alb  2.6<L>  /  TBili  0.6  /  DBili  x   /  AST  15  /  ALT  16  /  AlkPhos  64  08-12                          8.6    9.00  )-----------( 170      ( 13 Aug 2023 08:00 )             26.9       CAPILLARY BLOOD GLUCOSE      POCT Blood Glucose.: 263 mg/dL (13 Aug 2023 11:56)  POCT Blood Glucose.: 155 mg/dL (13 Aug 2023 07:33)  POCT Blood Glucose.: 204 mg/dL (12 Aug 2023 21:37)  POCT Blood Glucose.: 205 mg/dL (12 Aug 2023 17:03)  POCT Blood Glucose.: 226 mg/dL (12 Aug 2023 12:14)    Urinalysis Basic - ( 13 Aug 2023 08:00 )    Color: x / Appearance: x / SG: x / pH: x  Gluc: 160 mg/dL / Ketone: x  / Bili: x / Urobili: x   Blood: x / Protein: x / Nitrite: x   Leuk Esterase: x / RBC: x / WBC x   Sq Epi: x / Non Sq Epi: x / Bacteria: x    Culture - Blood (08.12.23 @ 01:34)   Specimen Source: .Blood Blood-Peripheral  Culture Results:   No growth at 24 hours      Historical Values  Culture - Blood (08.12.23 @ 01:34)   Specimen Source: .Blood Blood-Peripheral  Culture Results:   No growth at 24 hoursCulture - Blood (08.12.23 @ 01:34)   Specimen Source: .Blood Blood-Peripheral  Culture Results:   No growth at 24 hoursCulture - Blood (08.11.23 @ 09:40)   - Escherichia coli: Detec  Gram Stain:   Growth in aerobic bottle: Gram Negative Rods   Growth in anaerobic bottle: Gram Negative Rods  Specimen Source: .Blood Blood-Peripheral  Organism: Blood Culture PCR  Culture Results:   Growth in aerobic bottle: Gram Negative Rods   Direct identification is available within approximately 3-5   hours either by Blood Panel Multiplexed PCR or Direct   MALDI-TOF. Details: [email or web address]   Growth in anaerobic bottle: Gram Negative Rods  Organism Identification: Blood Culture PCR  Method Type: PCR          acetaminophen     Tablet .. 650 milliGRAM(s) Oral every 6 hours PRN  aluminum hydroxide/magnesium hydroxide/simethicone Suspension 30 milliLiter(s) Oral every 4 hours PRN  aspirin enteric coated 81 milliGRAM(s) Oral daily  atorvastatin 80 milliGRAM(s) Oral at bedtime  cefTRIAXone   IVPB 2000 milliGRAM(s) IV Intermittent every 24 hours  dextrose 5%. 1000 milliLiter(s) IV Continuous <Continuous>  dextrose 5%. 1000 milliLiter(s) IV Continuous <Continuous>  dextrose 50% Injectable 25 Gram(s) IV Push once  dextrose 50% Injectable 12.5 Gram(s) IV Push once  dextrose 50% Injectable 25 Gram(s) IV Push once  dextrose Oral Gel 15 Gram(s) Oral once PRN  glucagon  Injectable 1 milliGRAM(s) IntraMuscular once  heparin   Injectable 5000 Unit(s) SubCutaneous every 12 hours  insulin glargine Injectable (LANTUS) 37 Unit(s) SubCutaneous at bedtime  insulin lispro (ADMELOG) corrective regimen sliding scale   SubCutaneous three times a day before meals  insulin lispro (ADMELOG) corrective regimen sliding scale   SubCutaneous at bedtime  levothyroxine 75 MICROGram(s) Oral daily  melatonin 3 milliGRAM(s) Oral at bedtime PRN  metoprolol succinate  milliGRAM(s) Oral daily  ondansetron Injectable 4 milliGRAM(s) IV Push every 6 hours PRN  sodium chloride 0.9%. 1000 milliLiter(s) IV Continuous <Continuous>  tamsulosin 0.4 milliGRAM(s) Oral at bedtime              REVIEW OF SYSTEMS:  CONSTITUTIONAL: No fever, no weight loss, or no fatigue  NECK: No pain, no stiffness  RESPIRATORY: No cough, no wheezing, no chills, no hemoptysis, No shortness of breath  CARDIOVASCULAR: No chest pain, no palpitations, no dizziness, no leg swelling  GASTROINTESTINAL: No abdominal pain. No nausea, no vomiting, no hematemesis; No diarrhea, no constipation. No melena, no hematochezia.  GENITOURINARY: No dysuria, no frequency, no hematuria, no incontinence  NEUROLOGICAL: No headaches, no loss of strength, no numbness, no tremors  SKIN: No itching, no burning  MUSCULOSKELETAL: No joint pain, no swelling; No muscle, no back, no extremity pain  PSYCHIATRIC: No depression, no mood swings,   HEME/LYMPH: No easy bruising, no bleeding gums  ALLERY AND IMMUNOLOGIC: No hives       Consultant(s) Notes Reviewed:  [X] YES  [ ] NO    PHYSICAL EXAM:  GENERAL: NAD  HEAD:  Atraumatic, Normocephalic  EYES: EOMI, PERRLA, conjunctiva and sclera clear  ENMT: No tonsillar erythema, exudates, or enlargement; Moist mucous membranes  NECK: Supple, No JVD  NERVOUS SYSTEM:  Awake & alert  CHEST/LUNG: Clear to auscultation bilaterally; No rales, rhonchi, wheezing,  HEART: Regular rate and rhythm  ABDOMEN: Soft, Nontender, Nondistended; Bowel sounds present  EXTREMITIES:  No clubbing, cyanosis, or edema  LYMPH: No lymphadenopathy noted  SKIN: No rashes      Advanced care planning discussed with patient/family [X] YES   [ ] NO    Advanced care planning discussed with patient/family. Patient's health status was discussed. All appropriate changes have been made regarding patient's end-of-life care. Advanced care planning forms reviewed/discussed/completed.  20 minutes spent.   
Date of Service 08-14-23 @ 14:47    Patient is a 73y old  Female who presents with a chief complaint of n/v, fever (13 Aug 2023 13:37)      INTERVAL /OVERNIGHT EVENTS: feels better, chills and episodes of sweating improved    MEDICATIONS  (STANDING):  aspirin enteric coated 81 milliGRAM(s) Oral daily  atorvastatin 80 milliGRAM(s) Oral at bedtime  cefTRIAXone   IVPB 2000 milliGRAM(s) IV Intermittent every 24 hours  dextrose 5%. 1000 milliLiter(s) (50 mL/Hr) IV Continuous <Continuous>  dextrose 5%. 1000 milliLiter(s) (100 mL/Hr) IV Continuous <Continuous>  dextrose 50% Injectable 12.5 Gram(s) IV Push once  dextrose 50% Injectable 25 Gram(s) IV Push once  dextrose 50% Injectable 25 Gram(s) IV Push once  glucagon  Injectable 1 milliGRAM(s) IntraMuscular once  heparin   Injectable 5000 Unit(s) SubCutaneous every 8 hours  insulin glargine Injectable (LANTUS) 37 Unit(s) SubCutaneous at bedtime  insulin lispro (ADMELOG) corrective regimen sliding scale   SubCutaneous three times a day before meals  insulin lispro (ADMELOG) corrective regimen sliding scale   SubCutaneous at bedtime  levothyroxine 75 MICROGram(s) Oral daily  metoprolol succinate  milliGRAM(s) Oral daily  sodium chloride 0.9%. 1000 milliLiter(s) (84 mL/Hr) IV Continuous <Continuous>  tamsulosin 0.4 milliGRAM(s) Oral at bedtime    MEDICATIONS  (PRN):  acetaminophen     Tablet .. 650 milliGRAM(s) Oral every 6 hours PRN Temp greater or equal to 38C (100.4F), Mild Pain (1 - 3)  aluminum hydroxide/magnesium hydroxide/simethicone Suspension 30 milliLiter(s) Oral every 4 hours PRN Dyspepsia  dextrose Oral Gel 15 Gram(s) Oral once PRN Blood Glucose LESS THAN 70 milliGRAM(s)/deciliter  melatonin 3 milliGRAM(s) Oral at bedtime PRN Insomnia  ondansetron Injectable 4 milliGRAM(s) IV Push every 6 hours PRN Nausea and/or Vomiting      Allergies    penicillins (Hives)  Ativan (Rash; Urticaria)    Intolerances        REVIEW OF SYSTEMS:  CONSTITUTIONAL: No fever, weight loss, or fatigue  EYES: No eye pain, visual disturbances, or discharge  ENMT:  No difficulty hearing, tinnitus, vertigo; No sinus or throat pain  NECK: No pain or stiffness  RESPIRATORY: No cough, wheezing, chills or hemoptysis; No shortness of breath  CARDIOVASCULAR: No chest pain, palpitations, dizziness, or leg swelling  GASTROINTESTINAL: No abdominal or epigastric pain. No nausea, vomiting, or hematemesis; No diarrhea or constipation. No melena or hematochezia.  GENITOURINARY: No dysuria, frequency, hematuria, or incontinence  NEUROLOGICAL: No headaches, memory loss, loss of strength, numbness, or tremors  SKIN: No itching, burning, rashes, or lesions   LYMPH NODES: No enlarged glands  ENDOCRINE: No heat or cold intolerance; No hair loss; No polydipsia or polyuria  MUSCULOSKELETAL: No joint pain or swelling; No muscle, back, or extremity pain  PSYCHIATRIC: No depression, anxiety, mood swings, or difficulty sleeping  HEME/LYMPH: No easy bruising, or bleeding gums  ALLERGY AND IMMUNOLOGIC: No hives or eczema    Vital Signs Last 24 Hrs  T(C): 37.1 (14 Aug 2023 12:55), Max: 37.5 (14 Aug 2023 05:07)  T(F): 98.8 (14 Aug 2023 12:55), Max: 99.5 (14 Aug 2023 05:07)  HR: 71 (14 Aug 2023 12:55) (66 - 75)  BP: 121/70 (14 Aug 2023 12:55) (105/56 - 121/70)  BP(mean): --  RR: 17 (14 Aug 2023 12:55) (17 - 20)  SpO2: 95% (14 Aug 2023 12:55) (93% - 95%)    Parameters below as of 14 Aug 2023 12:55  Patient On (Oxygen Delivery Method): room air        PHYSICAL EXAM:  GENERAL: NAD, well-groomed, well-developed  HEAD:  Atraumatic, Normocephalic  EYES: EOMI, PERRLA, conjunctiva and sclera clear  ENMT: No tonsillar erythema, exudates, or enlargement; Moist mucous membranes, Good dentition, No lesions  NECK: Supple, No JVD, Normal thyroid  NERVOUS SYSTEM:  Alert & Oriented X3, Good concentration; Motor Strength 5/5 B/L upper and lower extremities; DTRs 2+ intact and symmetric  CHEST/LUNG: Clear to auscultation bilaterally; No rales, rhonchi, wheezing, or rubs  HEART: Regular rate and rhythm; No murmurs, rubs, or gallops  ABDOMEN: Soft, Nontender, Nondistended; Bowel sounds present  EXTREMITIES:  2+ Peripheral Pulses, No clubbing, cyanosis, or edema  LYMPH: No lymphadenopathy noted  SKIN: No rashes or lesions    LABS:                        9.4    7.59  )-----------( 193      ( 14 Aug 2023 07:53 )             29.4     14 Aug 2023 07:53    133    |  102    |  76     ----------------------------<  142    4.2     |  23     |  3.20     Ca    8.4        14 Aug 2023 07:53  Mg     2.5       14 Aug 2023 07:53        Urinalysis Basic - ( 14 Aug 2023 07:53 )    Color: x / Appearance: x / SG: x / pH: x  Gluc: 142 mg/dL / Ketone: x  / Bili: x / Urobili: x   Blood: x / Protein: x / Nitrite: x   Leuk Esterase: x / RBC: x / WBC x   Sq Epi: x / Non Sq Epi: x / Bacteria: x      CAPILLARY BLOOD GLUCOSE      POCT Blood Glucose.: 257 mg/dL (14 Aug 2023 14:32)  POCT Blood Glucose.: 203 mg/dL (14 Aug 2023 12:12)  POCT Blood Glucose.: 142 mg/dL (14 Aug 2023 08:07)  POCT Blood Glucose.: 221 mg/dL (13 Aug 2023 20:42)  POCT Blood Glucose.: 215 mg/dL (13 Aug 2023 16:51)      RADIOLOGY & ADDITIONAL TESTS:    Notes Reviewed:  [x ] YES  [ ] NO    Care Discussed with Consultants/Other Providers [x ] YES  [ ] NO
Date of Service 08-15-23 @ 14:25    Patient is a 73y old  Female who presents with a chief complaint of n/v, fever (15 Aug 2023 12:57)      INTERVAL /OVERNIGHT EVENTS: anxious to go home    MEDICATIONS  (STANDING):  aspirin enteric coated 81 milliGRAM(s) Oral daily  atorvastatin 80 milliGRAM(s) Oral at bedtime  cefTRIAXone   IVPB 2000 milliGRAM(s) IV Intermittent every 24 hours  dextrose 5%. 1000 milliLiter(s) (50 mL/Hr) IV Continuous <Continuous>  dextrose 5%. 1000 milliLiter(s) (100 mL/Hr) IV Continuous <Continuous>  dextrose 50% Injectable 25 Gram(s) IV Push once  dextrose 50% Injectable 12.5 Gram(s) IV Push once  dextrose 50% Injectable 25 Gram(s) IV Push once  glucagon  Injectable 1 milliGRAM(s) IntraMuscular once  heparin   Injectable 5000 Unit(s) SubCutaneous every 8 hours  insulin glargine Injectable (LANTUS) 37 Unit(s) SubCutaneous at bedtime  insulin lispro (ADMELOG) corrective regimen sliding scale   SubCutaneous three times a day before meals  insulin lispro (ADMELOG) corrective regimen sliding scale   SubCutaneous at bedtime  lactobacillus acidophilus 1 Tablet(s) Oral daily  levothyroxine 75 MICROGram(s) Oral daily  metoprolol succinate  milliGRAM(s) Oral daily  tamsulosin 0.4 milliGRAM(s) Oral at bedtime    MEDICATIONS  (PRN):  acetaminophen     Tablet .. 650 milliGRAM(s) Oral every 6 hours PRN Temp greater or equal to 38C (100.4F), Mild Pain (1 - 3)  aluminum hydroxide/magnesium hydroxide/simethicone Suspension 30 milliLiter(s) Oral every 4 hours PRN Dyspepsia  dextrose Oral Gel 15 Gram(s) Oral once PRN Blood Glucose LESS THAN 70 milliGRAM(s)/deciliter  melatonin 3 milliGRAM(s) Oral at bedtime PRN Insomnia  ondansetron Injectable 4 milliGRAM(s) IV Push every 6 hours PRN Nausea and/or Vomiting      Allergies    penicillins (Hives)  Ativan (Rash; Urticaria)    Intolerances        REVIEW OF SYSTEMS:  CONSTITUTIONAL: No fever, weight loss, or fatigue  EYES: No eye pain, visual disturbances, or discharge  ENMT:  No difficulty hearing, tinnitus, vertigo; No sinus or throat pain  NECK: No pain or stiffness  RESPIRATORY: No cough, wheezing, chills or hemoptysis; No shortness of breath  CARDIOVASCULAR: No chest pain, palpitations, dizziness, or leg swelling  GASTROINTESTINAL: No abdominal or epigastric pain. No nausea, vomiting, or hematemesis; No diarrhea or constipation. No melena or hematochezia.  GENITOURINARY: No dysuria, frequency, hematuria, or incontinence  NEUROLOGICAL: No headaches, memory loss, loss of strength, numbness, or tremors  SKIN: No itching, burning, rashes, or lesions   LYMPH NODES: No enlarged glands  ENDOCRINE: No heat or cold intolerance; No hair loss; No polydipsia or polyuria  MUSCULOSKELETAL: No joint pain or swelling; No muscle, back, or extremity pain  PSYCHIATRIC: No depression, anxiety, mood swings, or difficulty sleeping  HEME/LYMPH: No easy bruising, or bleeding gums  ALLERGY AND IMMUNOLOGIC: No hives or eczema    Vital Signs Last 24 Hrs  T(C): 36.6 (15 Aug 2023 12:32), Max: 37.4 (15 Aug 2023 04:39)  T(F): 97.8 (15 Aug 2023 12:32), Max: 99.3 (15 Aug 2023 04:39)  HR: 67 (15 Aug 2023 12:32) (67 - 87)  BP: 149/64 (15 Aug 2023 12:32) (116/71 - 149/64)  BP(mean): --  RR: 18 (15 Aug 2023 12:32) (17 - 18)  SpO2: 95% (15 Aug 2023 12:32) (94% - 96%)    Parameters below as of 15 Aug 2023 12:32  Patient On (Oxygen Delivery Method): room air        PHYSICAL EXAM:  GENERAL: NAD, well-groomed, well-developed  HEAD:  Atraumatic, Normocephalic  EYES: EOMI, PERRLA, conjunctiva and sclera clear  ENMT: No tonsillar erythema, exudates, or enlargement; Moist mucous membranes, Good dentition, No lesions  NECK: Supple, No JVD, Normal thyroid  NERVOUS SYSTEM:  Alert & Oriented X3, Good concentration; Motor Strength 5/5 B/L upper and lower extremities; DTRs 2+ intact and symmetric  CHEST/LUNG: Clear to auscultation bilaterally; No rales, rhonchi, wheezing, or rubs  HEART: Regular rate and rhythm; No murmurs, rubs, or gallops  ABDOMEN: Soft, Nontender, Nondistended; Bowel sounds present  EXTREMITIES:  2+ Peripheral Pulses, No clubbing, cyanosis, or edema  LYMPH: No lymphadenopathy noted  SKIN:  no rashes    LABS:                        9.8    6.29  )-----------( 237      ( 15 Aug 2023 11:48 )             30.1     15 Aug 2023 07:00    135    |  103    |  67     ----------------------------<  115    4.1     |  26     |  3.00     Ca    8.2        15 Aug 2023 07:00    TPro  6.7    /  Alb  2.3    /  TBili  0.3    /  DBili  x      /  AST  9      /  ALT  12     /  AlkPhos  58     15 Aug 2023 07:00      Urinalysis Basic - ( 15 Aug 2023 07:00 )    Color: x / Appearance: x / SG: x / pH: x  Gluc: 115 mg/dL / Ketone: x  / Bili: x / Urobili: x   Blood: x / Protein: x / Nitrite: x   Leuk Esterase: x / RBC: x / WBC x   Sq Epi: x / Non Sq Epi: x / Bacteria: x      CAPILLARY BLOOD GLUCOSE      POCT Blood Glucose.: 140 mg/dL (15 Aug 2023 12:05)  POCT Blood Glucose.: 110 mg/dL (15 Aug 2023 08:02)  POCT Blood Glucose.: 228 mg/dL (14 Aug 2023 20:47)  POCT Blood Glucose.: 207 mg/dL (14 Aug 2023 16:53)  POCT Blood Glucose.: 257 mg/dL (14 Aug 2023 14:32)      RADIOLOGY & ADDITIONAL TESTS:    Notes Reviewed:  [x ] YES  [ ] NO    Care Discussed with Consultants/Other Providers [x ] YES  [ ] NO

## 2023-08-15 NOTE — PROGRESS NOTE ADULT - PROBLEM SELECTOR PROBLEM 1
Sepsis due to Gram negative bacteria

## 2023-08-15 NOTE — PROGRESS NOTE ADULT - REASON FOR ADMISSION
n/v, fever

## 2023-08-15 NOTE — DISCHARGE NOTE PROVIDER - PROVIDER TOKENS
PROVIDER:[TOKEN:[[id number]]],PROVIDER:[TOKEN:[[id number]]],PROVIDER:[TOKEN:[[id number]]]

## 2023-08-15 NOTE — PROGRESS NOTE ADULT - PROBLEM SELECTOR PLAN 1
Likely 2/2 acute pyelonephritis, ?recently passed stone  F/U culture data  Repeat cultures NTD  Continue IV Rocephin  ID f/u  Case d/w  Dr. Luna -- no further inpatient work-up is required; f/u as outpatient  Further work-up/management pending clinical course.
Likely 2/2 acute pyelonephritis, ?recently passed stone  F/U culture data  Continue IV Rocephin  ID f/u   consult  Further work-up/management pending clinical course.

## 2023-08-15 NOTE — PROGRESS NOTE ADULT - PROBLEM SELECTOR PLAN 4
Continue Lantus with RISS
Continue Lantus with RISS
Continue Lantus with RISS  endo eval
Continue Lantus with RISS

## 2023-08-15 NOTE — CHART NOTE - NSCHARTNOTEFT_GEN_A_CORE
Do you have Advance Directives (HCP / LV / Organ donation / Documentation of oral advance Directive):   ( x   )  yes    (      )    NO                                                                            Do you have LV - Living will :                                                                                                                                             (   x )  yes    (      )   No    Do you have HCP - Health Care Proxy:                                                                                                                            (   x  )  yes   (       ) N0    Do you have DNR- Do Not Resuscitate :                                                                                                                           (      )  yes  ( x       )  No    Do you have DNI- Do Not intubate  :                                                                                                                               (      )  yes   x) No    Do you have MOLST - Medical orders for Life sustaining treatment  :                                                                    (      ) yes    x) No    Decision Maker :  ( x    ) Patient     (      )  HCA   (     ) Public Health Law Surrogate     (      ) Surrogate  (       ) Guardian    Goals of Care :  (   x   )   Complete Care     (       ) No Limitations                              (       )   Comfort Care       (       )  Hospice                               (      )   Limited medical Intervention / s    Medical Interventions :   (   x     )   CPR       (        )  DNR                                               (     x   )  Intubation with MV - Mechanical Ventilation  x) BIPAP/CPAP    (         )   DNI                                               (      x   )  Artificial Nutrition -  IVF, TPN / PPN, Tube Feeds             (         )   No Feeding Tube                                                (     x   ) Use Antibiotics                         (          ) No Antibiotics                                                (  x       ) Blood and Blood Products     (         )   No Blood or Blood products                                                (    x      )  Dialysis                                    (         )  No Dialysis                                                (          )  Medical Management only  (         )  No Invasive Interventions or Surgery  Time spent :                        (       ) upto 30 minutes                       (    x       )   more than 30 minutes  ACP and GOC reviewed and discussed
Called by RN for positive blood cultures showing Gram negative rods in both aerobic and anaerobic tubes. Pt being treated with Rocephin 2g qD since 8/11.  - Repeat cultures sent   - Follow up sensitivities   - Continue current antibiotic regimen

## 2023-08-15 NOTE — PROGRESS NOTE ADULT - PROBLEM SELECTOR PLAN 2
De La Garza placed  Started on Flomax  TOV today
De La Garza placed  Started on Flomax
De La Garza placed  Started on Flomax
Monitor PVR  Straight cath prn  Start Flomax   consult

## 2023-08-15 NOTE — DISCHARGE NOTE PROVIDER - CARE PROVIDER_API CALL
Jluis Wilde  Nephrology  [address], [address]  [place] [address]  Phone: [phone]  Fax: [phone]  Follow Up Time:     Cesar Brown  Urology  [address], [address]  [place] [address]  Phone: [phone]  Fax: [phone]  Follow Up Time:     Onesimo Gandhi  Physician Assistant Services  [address], [address]  [place] [address]  Phone: [phone]  Fax: [phone]  Follow Up Time:

## 2023-08-15 NOTE — PROGRESS NOTE ADULT - PROVIDER SPECIALTY LIST ADULT
Infectious Disease
Nephrology
Nephrology
Infectious Disease
Nephrology
Nephrology
Internal Medicine
Family Medicine
Family Medicine
Internal Medicine

## 2023-08-15 NOTE — DISCHARGE NOTE PROVIDER - HOSPITAL COURSE
admitted for sepsis with bacteremia  ABX per ID  source likely genito urinary tract  DUNCAN on CKD  urinary retention - hardy with flomax  DC after ID and renal clearance

## 2023-08-15 NOTE — PROGRESS NOTE ADULT - PROBLEM SELECTOR PLAN 5
Continue Torsemide and eplerenone

## 2023-08-15 NOTE — DISCHARGE NOTE PROVIDER - NSDCCPCAREPLAN_GEN_ALL_CORE_FT
PRINCIPAL DISCHARGE DIAGNOSIS  Diagnosis: Pyelonephritis  Assessment and Plan of Treatment: follow up with urologist Dr. haque

## 2023-08-15 NOTE — PROGRESS NOTE ADULT - PROBLEM SELECTOR PROBLEM 4
Diabetes mellitus, type 2

## 2023-08-15 NOTE — DISCHARGE NOTE NURSING/CASE MANAGEMENT/SOCIAL WORK - NSDCPEFALRISK_GEN_ALL_CORE
For information on Fall & Injury Prevention, visit: [email or web address] OR  [email or web address] OR  https://www.cdc.gov/steadi/patient.html

## 2023-08-15 NOTE — CASE MANAGEMENT PROGRESS NOTE - NSCMPROGRESSNOTE_GEN_ALL_CORE
Per MD Pt is stable for transition to home today. Discharge notice (IMM) reviewed with patient, Pt is in agreement with transitioning to home today. Copy of discharge notice given to patient. Family will be transporting patient home.

## 2023-08-15 NOTE — DISCHARGE NOTE NURSING/CASE MANAGEMENT/SOCIAL WORK - PATIENT PORTAL LINK FT
You can access the FollowMyHealth Patient Portal offered by [facility] by registering at the following website: [email or web address]. By joining [employer]’s FollowMyHealth portal, you will also be able to view your health information using other applications (apps) compatible with our system.

## 2023-08-15 NOTE — PROGRESS NOTE ADULT - ASSESSMENT
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 Renal US - No hydronephrosis, with resolution of right hydronephrosis since the prior CT. Urology consult   -Urine studies reviewed   -Lactic acid improved  -Hold Torsemide for now  -Goal sodium correction 6-8 meq in 24 hour period  -No acute indication for RRT  -[place] removed, monitor for retention  -Creatinine improving  -S/p IVF

## 2023-08-15 NOTE — DISCHARGE NOTE PROVIDER - NSDCMRMEDTOKEN_GEN_ALL_CORE_FT
aspirin 81 mg oral tablet: 1 tab(s) orally once a day  atorvastatin 80 mg oral tablet: 1 tab(s) orally once a day  eplerenone 25 mg oral tablet: 1 tab(s) orally once a day  lactobacillus acidophilus oral capsule: 1 cap(s) orally once a day  Lantus 100 units/mL subcutaneous solution: 37 unit(s) subcutaneous once a day (at bedtime)  levothyroxine 75 mcg (0.075 mg) oral tablet: 1 tab(s) orally once a day  NovoLOG 100 units/mL subcutaneous solution: subcutaneous Sliding scale MDD 40units  potassium chloride 10 mEq oral capsule, extended release: 1 cap(s) orally once a day  PT and OT: 3 to 5 times a week MDD: 1  Toprol- mg oral tablet, extended release: 1 tab(s) orally once a day  torsemide 20 mg oral tablet: 1 tab(s) orally once a day   aspirin 81 mg oral tablet: 1 tab(s) orally once a day  atorvastatin 80 mg oral tablet: 1 tab(s) orally once a day  Cipro 250 mg oral tablet: 1 tab(s) orally once a day  eplerenone 25 mg oral tablet: 1 tab(s) orally once a day  lactobacillus acidophilus oral capsule: 1 cap(s) orally once a day  Lantus 100 units/mL subcutaneous solution: 37 unit(s) subcutaneous once a day (at bedtime)  levothyroxine 75 mcg (0.075 mg) oral tablet: 1 tab(s) orally once a day  NovoLOG 100 units/mL subcutaneous solution: subcutaneous Sliding scale MDD 40units  potassium chloride 10 mEq oral capsule, extended release: 1 cap(s) orally once a day  PT and OT: 3 to 5 times a week MDD: 1  Toprol- mg oral tablet, extended release: 1 tab(s) orally once a day  torsemide 20 mg oral tablet: 1 tab(s) orally once a day

## 2023-08-15 NOTE — DISCHARGE NOTE PROVIDER - CARE PROVIDERS DIRECT ADDRESSES
,DirectAddress_Unknown,[email or web address],DirectAddress_Unknown

## 2023-08-21 NOTE — PROCEDURE
[Outpatient] : Outpatient [Wheelchair] : wheelchair [____] : Pre-Dive: Time - [unfilled] [___] : Pre-Dive: Value - [unfilled] mg/dL [Patient demonstrated and verbalized proper technique for using air break mask] : Patient demonstrated and verbalized proper technique for using air break mask [Patient educated on the risks of SMOKING prior to HBOT with understanding] : Patient educated on the risks of SMOKING prior to HBOT with understanding [Patient educated on the risks of CONSUMING ALCOHOL prior to HBOT with understanding] : Patient educated on the risks of CONSUMING ALCOHOL prior to HBOT with understanding [100% Cotton] : 100% cotton [Empty all pockets] : empty all pockets [No hair oils, wigs, hairpieces, pins] : no hair oils, wigs, hairpieces, pins  [Pre tx medications] : pre tx medications  [No make-up, creams] : no make-up, creams  [No jewelry] : no jewelry  [No matches, cigarettes, lighters] : no matches, cigarettes, lighters  [Hearing aid removed] : hearing aid removed [Dentures removed] : dentures removed [Ground bracelet on pt's wrist] : ground bracelet on pt's wrist  [Contacts removed] : contacts removed  [Remove nail polish] : remove nail polish  [Bra, undergarments removed] : bra, undergarments removed  [No reading material] : no reading material  [No contraindicated dressings] : no contraindicated dressings [Ground Wire - VISUAL Verification - Intact/Free of Obstruction] : Ground Wire - VISUAL Verification - Intact/Free of Obstruction  [Ground Continuity - Verified < 1 ohm w/ Wrist Strap Channing] : Ground Continuity - Verified < 1 ohm w/ Wrist Strap Channing [THIS CHAMBER HAS BEEN CLEANED / DISINFECTED] : This chamber has been cleaned / disinfected according to local and hospital policy and procedure prior to this treatment. [Clear all fields] : clear all fields [Number: ___] : Number: [unfilled] [Diagnosis: ___] : Diagnosis: [unfilled] [] : No [FreeTextEntry1] : Chamber 3- #97 [FreeTextEntry3] : 90 [FreeTextEntry5] : 1037 [FreeTextEntry7] : 3439 [FreeTextEntry9] : 7778 [de-identified] : 0329 [de-identified] : 108 min

## 2023-08-21 NOTE — ADDENDUM
[FreeTextEntry1] : Patient arrived in wheel chair. Tx order verified.  Patient descended to 2.0 EULALIO @ 1.75 PSI/Min without incident. Patient resting comfortably @ depth. Chest rise observed throughout tx.  Patient ascended from 2.0 EULALIO @ 1.75 PSI/Min without incident. Patient tolerated treatment well.

## 2023-08-22 NOTE — ADDENDUM
[FreeTextEntry1] : Patient arrived to HBOT suite safely. Patient vitals assessed prior to descent to reveal stable values for treatment. Patient dive orders verified prior to descent. CHAMBER #4 RUN #93 Patient descended to 2.0 EULALIO  @ 1.75 PSI/Min compression with no discomfort or intervention required. Patient resting comfortably at Rx depth with equal and adequate chest rise and fall noted throughout. Patient ascended to surface over a nine minute decompression with no discomfort or intervention required. Patient vitals assessed post-treatment to reveal stable values for departure. Patient exited HBOT suite safely. NOTHING FURTHER TO REPORT.

## 2023-08-22 NOTE — PROCEDURE
[Outpatient] : Outpatient [Wheelchair] : wheelchair [THIS CHAMBER HAS BEEN CLEANED / DISINFECTED] : This chamber has been cleaned / disinfected according to local and hospital policy and procedure prior to this treatment. [Patient demonstrated and verbalized proper technique for using air break mask] : Patient demonstrated and verbalized proper technique for using air break mask [Patient educated on the risks of SMOKING prior to HBOT with understanding] : Patient educated on the risks of SMOKING prior to HBOT with understanding [Patient educated on the risks of CONSUMING ALCOHOL prior to HBOT with understanding] : Patient educated on the risks of CONSUMING ALCOHOL prior to HBOT with understanding [100% Cotton] : 100% cotton [Empty all pockets] : empty all pockets [No hair oils, wigs, hairpieces, pins] : no hair oils, wigs, hairpieces, pins  [Pre tx medications] : pre tx medications  [No make-up, creams] : no make-up, creams  [No jewelry] : no jewelry  [No matches, cigarettes, lighters] : no matches, cigarettes, lighters  [Hearing aid removed] : hearing aid removed [Dentures removed] : dentures removed [Ground bracelet on pt's wrist] : ground bracelet on pt's wrist  [Contacts removed] : contacts removed  [Remove nail polish] : remove nail polish  [No reading material] : no reading material  [Bra, undergarments removed] : bra, undergarments removed  [No contraindicated dressings] : no contraindicated dressings [Ground Wire - VISUAL Verification - Intact/Free of Obstruction] : Ground Wire - VISUAL Verification - Intact/Free of Obstruction  [Ground Continuity - Verified < 1 ohm w/ Wrist Strap Channing] : Ground Continuity - Verified < 1 ohm w/ Wrist Strap Channing [Number: ___] : Number: [unfilled] [Diagnosis: ___] : Diagnosis: [unfilled] [____] : Post-Dive: Time - [unfilled] [___] : Post-Dive: Value - [unfilled] mg/dL [Clear all fields] : clear all fields [] : No [FreeTextEntry1] : Chamber 4-#93 [FreeTextEntry3] : 90 [FreeTextEnBarn[facility]5] : 6329 [FreeTextEntry7] : 7313 [FreeTextEntry9] : 3158 [de-identified] : 5125 [de-identified] : 108

## 2023-08-23 NOTE — ADDENDUM
[FreeTextEntry1] : Transcription of pre tx vitals. Care transferred to HT(c). PT A&OX3 AND AMBULATING UNASSISTED INTO HYPERBARIC SUITE PT ORDER VERIFIED PRIOR TO TREATMENT PT CONTRAINDICATION LIST AND PRE CHECK LIST VERIFIED PT HAD CONTRAINDICATED DRESSING ON R FOOT  DRESSING CHANGED PRIOR TO TREATMENT PT VITALS WERE WITHIN PARAMETERS FOR HBOT PT DESCENDED TO 2.0 EULALIO AT 1.75 PSI/MIN IN CHAMBER #4-81 PT OBSERVED FOR FACIAL TWITCHING AND CHEST RISE BY HT SEATED CHAMBERSIDE PT ASCENDED FROM TX DEPTH OF 2.0 EULALIO @ 1.75 PSI/MIN PT TOLERATED TREATMENT PT EXITED HYPERBARIC SUITE SAFELY ACCOMPANIED BY HT

## 2023-08-23 NOTE — PROCEDURE
[] : No [FreeTextEntry3] : 90 [FreeTextEntry7] : 2180 [FreeTextEntry7] : 4193 [[facility] : 7324 [de-identified] : 0616 [de-identified] : 108 min

## 2023-08-25 NOTE — ADDENDUM
[FreeTextEntry1] : Patient arrived to HBOT suite safely. Patient vitals assessed prior to descent to reveal stable values for treatment. Patient dive orders verified prior to descent. CHAMBER #1 RUN #113 Patient descended to 2.0 EULALIO  @ 1.75 PSI/Min compression with no discomfort or intervention required. Patient resting comfortably at Rx depth with equal and adequate chest rise and fall noted throughout. Patient ascended to surface over a nine minute decompression with no discomfort or intervention required. Patient vitals assessed post-treatment to reveal stable values for departure. Patient exited HBOT suite safely. NOTHING FURTHER TO REPORT.

## 2023-08-25 NOTE — PROCEDURE
[Outpatient] : Outpatient [Cane] : cane [Wheelchair] : wheelchair [Patient demonstrated and verbalized proper technique for using air break mask] : Patient demonstrated and verbalized proper technique for using air break mask [Patient educated on the risks of SMOKING prior to HBOT with understanding] : Patient educated on the risks of SMOKING prior to HBOT with understanding [Patient educated on the risks of CONSUMING ALCOHOL prior to HBOT with understanding] : Patient educated on the risks of CONSUMING ALCOHOL prior to HBOT with understanding [100% Cotton] : 100% cotton [Empty all pockets] : empty all pockets [No hair oils, wigs, hairpieces, pins] : no hair oils, wigs, hairpieces, pins  [Pre tx medications] : pre tx medications  [No make-up, creams] : no make-up, creams  [No jewelry] : no jewelry  [No matches, cigarettes, lighters] : no matches, cigarettes, lighters  [Hearing aid removed] : hearing aid removed [Dentures removed] : dentures removed [Ground bracelet on pt's wrist] : ground bracelet on pt's wrist  [Contacts removed] : contacts removed  [Remove nail polish] : remove nail polish  [No reading material] : no reading material  [Bra, undergarments removed] : bra, undergarments removed  [No contraindicated dressings] : no contraindicated dressings [Ground Wire - VISUAL Verification - Intact/Free of Obstruction] : Ground Wire - VISUAL Verification - Intact/Free of Obstruction  [Ground Continuity - Verified < 1 ohm w/ Wrist Strap Channing] : Ground Continuity - Verified < 1 ohm w/ Wrist Strap Channing [Number: ___] : Number: [unfilled] [Diagnosis: ___] : Diagnosis: [unfilled] [____] : Post-Dive: Time - [unfilled] [___] : Post-Dive: Value - [unfilled] mg/dL [Clear all fields] : clear all fields [] : No [FreeTextEntry1] : Chamber 1  [FreeTextEntry3] : 90 [FreeTextEntry5] : 0192 [[facility]] : 5610 [FreeTextEntry9] : 5633 [de-identified] : 1383 [de-identified] : 108

## 2023-08-25 NOTE — PROCEDURE
[Outpatient] : Outpatient [Ambulatory] : Patient is ambulatory. [THIS CHAMBER HAS BEEN CLEANED / DISINFECTED] : This chamber has been cleaned / disinfected according to local and hospital policy and procedure prior to this treatment. [Patient demonstrated and verbalized proper technique for using air break mask] : Patient demonstrated and verbalized proper technique for using air break mask [Patient educated on the risks of SMOKING prior to HBOT with understanding] : Patient educated on the risks of SMOKING prior to HBOT with understanding [Patient educated on the risks of CONSUMING ALCOHOL prior to HBOT with understanding] : Patient educated on the risks of CONSUMING ALCOHOL prior to HBOT with understanding [100% Cotton] : 100% cotton [Empty all pockets] : empty all pockets [No hair oils, wigs, hairpieces, pins] : no hair oils, wigs, hairpieces, pins  [Pre tx medications] : pre tx medications  [No make-up, creams] : no make-up, creams  [No jewelry] : no jewelry  [No matches, cigarettes, lighters] : no matches, cigarettes, lighters  [Hearing aid removed] : hearing aid removed [Dentures removed] : dentures removed [Ground bracelet on pt's wrist] : ground bracelet on pt's wrist  [Contacts removed] : contacts removed  [Remove nail polish] : remove nail polish  [No reading material] : no reading material  [Bra, undergarments removed] : bra, undergarments removed  [No contraindicated dressings] : no contraindicated dressings [Ground Wire - VISUAL Verification - Intact/Free of Obstruction] : Ground Wire - VISUAL Verification - Intact/Free of Obstruction  [Ground Continuity - Verified < 1 ohm w/ Wrist Strap Channing] : Ground Continuity - Verified < 1 ohm w/ Wrist Strap Channing [Number: ___] : Number: [unfilled] [Diagnosis: ___] : Diagnosis: [unfilled] [____] : Post-Dive: Time - [unfilled] [___] : Post-Dive: Value - [unfilled] mg/dL [Clear all fields] : clear all fields [] : No [FreeTextEntry1] : Chamber 4 Cycle 103 [FreeTextEntry3] : 90 [FreeTextEntry5] : 3974 [[facility]] : 9575 [FreeTextEntry9] : 9176 [de-identified] : 7347 [de-identified] : 108

## 2023-08-28 NOTE — PROCEDURE
[Outpatient] : Outpatient [Ambulatory] : Patient is ambulatory. [Cane] : cane [THIS CHAMBER HAS BEEN CLEANED / DISINFECTED] : This chamber has been cleaned / disinfected according to local and hospital policy and procedure prior to this treatment. [Patient demonstrated and verbalized proper technique for using air break mask] : Patient demonstrated and verbalized proper technique for using air break mask [Patient educated on the risks of SMOKING prior to HBOT with understanding] : Patient educated on the risks of SMOKING prior to HBOT with understanding [Patient educated on the risks of CONSUMING ALCOHOL prior to HBOT with understanding] : Patient educated on the risks of CONSUMING ALCOHOL prior to HBOT with understanding [100% Cotton] : 100% cotton [Empty all pockets] : empty all pockets [No hair oils, wigs, hairpieces, pins] : no hair oils, wigs, hairpieces, pins  [Pre tx medications] : pre tx medications  [No make-up, creams] : no make-up, creams  [No jewelry] : no jewelry  [No matches, cigarettes, lighters] : no matches, cigarettes, lighters  [Hearing aid removed] : hearing aid removed [Dentures removed] : dentures removed [Ground bracelet on pt's wrist] : ground bracelet on pt's wrist  [Contacts removed] : contacts removed  [Remove nail polish] : remove nail polish  [No reading material] : no reading material  [Bra, undergarments removed] : bra, undergarments removed  [No contraindicated dressings] : no contraindicated dressings [Ground Wire - VISUAL Verification - Intact/Free of Obstruction] : Ground Wire - VISUAL Verification - Intact/Free of Obstruction  [Ground Continuity - Verified < 1 ohm w/ Wrist Strap Channing] : Ground Continuity - Verified < 1 ohm w/ Wrist Strap Channing [____] : Post-Dive: Time - [unfilled] [___] : Post-Dive: Value - [unfilled] mg/dL [Clear all fields] : clear all fields [Number: ___] : Number: [unfilled] [Diagnosis: ___] : Diagnosis: [unfilled] [] : No [FreeTextEntry1] : Chamber 1- #122 [FreeTextEntry3] : 90 [FreeTextEntry5] : 5290 [FreeTextEntry7] : 7124 [[facility]try9] : 8830 [de-identified] : 8729 [de-identified] : 108 min

## 2023-08-28 NOTE — PHYSICAL EXAM
[2+] : right 2+ [Ankle Swelling (On Exam)] : present [Ankle Swelling Bilaterally] : severe [Varicose Veins Of Lower Extremities] : not present [] : not present [FreeTextEntry1] : palpable pulses [de-identified] : r mtp callous

## 2023-08-28 NOTE — PLAN
[TextEntry] : I have reviewed tests with patient. I have spent a total of 20 minutes with patient.  No follow up needed

## 2023-08-28 NOTE — ADDENDUM
[FreeTextEntry1] : Patient arrived ambulatory with cane. Tx order verified.  Patient descended to 2.0 EULALIO @ 1.75 PSI/Min without incident. Patient resting comfortably @ depth. Chest rise observed throughout tx.  Patient ascended from 2.0 EULALIO @ 1.75 PSI/Min without incident. Patient tolerated treatment well.  Post vitals taken upon patient exiting chamber. Entered into note at a later time.

## 2023-08-28 NOTE — HISTORY OF PRESENT ILLNESS
[FreeTextEntry1] : Patient is 73 year old female presenting for diabetic foot ulcer to the right foot that has been present since April 2023 and was being treated by patient's Podiatrist. Per patient's history she first presented to [facility] January 2021 s/p right foot incision and drainage with sesamoidectomy and removal of foreign body (pt stepped on an insulin needle at home). Surgical wound reopened April 2023 and pt was referred to [facility] by her podiatrist, Gustavo Mohan. Pt is diabetic with chronic kidney failure (non-dialysis, without neuropathy). Palpable pulses, bilaterally. BLE lymphedema.  She has been undergoing HBT and is making good progress. Today to go over ABIs

## 2023-08-30 NOTE — PROCEDURE
[Outpatient] : Outpatient [Wheelchair] : wheelchair [THIS CHAMBER HAS BEEN CLEANED / DISINFECTED] : This chamber has been cleaned / disinfected according to local and hospital policy and procedure prior to this treatment. [Patient demonstrated and verbalized proper technique for using air break mask] : Patient demonstrated and verbalized proper technique for using air break mask [Patient educated on the risks of SMOKING prior to HBOT with understanding] : Patient educated on the risks of SMOKING prior to HBOT with understanding [Patient educated on the risks of CONSUMING ALCOHOL prior to HBOT with understanding] : Patient educated on the risks of CONSUMING ALCOHOL prior to HBOT with understanding [100% Cotton] : 100% cotton [Empty all pockets] : empty all pockets [No hair oils, wigs, hairpieces, pins] : no hair oils, wigs, hairpieces, pins  [Pre tx medications] : pre tx medications  [No make-up, creams] : no make-up, creams  [No jewelry] : no jewelry  [No matches, cigarettes, lighters] : no matches, cigarettes, lighters  [Hearing aid removed] : hearing aid removed [Dentures removed] : dentures removed [Ground bracelet on pt's wrist] : ground bracelet on pt's wrist  [Contacts removed] : contacts removed  [Remove nail polish] : remove nail polish  [No reading material] : no reading material  [Bra, undergarments removed] : bra, undergarments removed  [No contraindicated dressings] : no contraindicated dressings [Ground Wire - VISUAL Verification - Intact/Free of Obstruction] : Ground Wire - VISUAL Verification - Intact/Free of Obstruction  [Ground Continuity - Verified < 1 ohm w/ Wrist Strap Channing] : Ground Continuity - Verified < 1 ohm w/ Wrist Strap Channing [Number: ___] : Number: [unfilled] [Diagnosis: ___] : Diagnosis: [unfilled] [____] : Post-Dive: Time - [unfilled] [___] : Post-Dive: Value - [unfilled] mg/dL [Clear all fields] : clear all fields [] : No [FreeTextEntry1] : Chamber 2- #154 [FreeTextEntry3] : 90 [FreeTextEntry5] : 2452 [[facility]] : 9128 [FreeTextEntry9] : 9345 [de-identified] : 1509 [de-identified] : 108 min

## 2023-08-30 NOTE — ADDENDUM
[FreeTextEntry1] : Patient arrived ambulatory with cane. Tx order verified.  Patient descended to 2.0 EULALIO @ 1.75 PSI/Min without incident. Patient resting comfortably @ depth. Chest rise observed throughout tx.  Patient ascended from 2.0 EULALIO @ 1.75 PSI/Min without incident. Patient tolerated treatment well.

## 2023-08-31 NOTE — PHYSICAL EXAM
[4 x 4] : 4 x 4  [2+] : left 2+ [Ankle Swelling (On Exam)] : not present [Varicose Veins Of Lower Extremities] : not present [] : not present [Skin Ulcer] : ulcer [Alert] : alert [Oriented to Person] : oriented to person [Oriented to Place] : oriented to place [Oriented to Time] : oriented to time [Calm] : calm [de-identified] : A&Ox3, NAD [de-identified] : HTN, diabetic small vessel disease  [de-identified] : morbid obesity  [de-identified] : s/p right foot sesamoidectomy, 3/5 strength in all quadrants bilaterally [de-identified] : right foot plantar 1st metatarsal diabetic ulcer down to the level of fat, no purulence, no edema, no erythema , no signs of infection  [de-identified] : Diminished light touch sensation bilaterally [FreeTextEntry1] : Plantar Foot [FreeTextEntry2] : 0.3 [FreeTextEntry3] : 0.2 [FreeTextEntry4] : 0.5 [de-identified] : small serous [de-identified] : none [de-identified] : diabetic  [de-identified] : none [de-identified] : jose - AMELIA shaved callous.  [de-identified] : none [de-identified] : none [de-identified] : 100% [de-identified] : Dry Dressing [de-identified] : Mechanically cleansed with sterile gauze and normal saline 0.9% Dry Dressing [TWNoteComboBox1] : Right [de-identified] : 3x Weekly [de-identified] : Secondary Dressing

## 2023-08-31 NOTE — PLAN
[FreeTextEntry1] : .Patient seen and evaluated. Discussed etiology and treatment plan. Patient verbalized understanding. Continue local wound care and offloading.  Patient to continue hyperbaric oxygen therapy.  Spent 20 minutes for patient care and medical decision making.

## 2023-08-31 NOTE — REVIEW OF SYSTEMS
[Fever] : no fever [Chills] : no chills [Eye Pain] : no eye pain [Earache] : no earache [Loss Of Hearing] : no hearing loss [Chest Pain] : no chest pain [Shortness Of Breath] : no shortness of breath [Cough] : no cough [Abdominal Pain] : no abdominal pain [Joint Stiffness] : joint stiffness [Skin Wound] : skin wound [Anxiety] : no anxiety [Easy Bleeding] : no tendency for easy bleeding [FreeTextEntry2] : morbid obesity [FreeTextEntry5] : HTN , HLD [FreeTextEntry9] : morbid obesity  [de-identified] : right foot plantar  1st metatarsal, DFU 3  down to the level of fat [de-identified] : diabetic neuropathy [de-identified] : KENNY

## 2023-08-31 NOTE — ASSESSMENT
[Hyperbaric Therapy] : hyperbaric therapy [Verbal] : Verbal [Demo] : Demo [Patient] : Patient [Good - alert, interested, motivated] : Good - alert, interested, motivated [Verbalizes knowledge/Understanding] : Verbalizes knowledge/understanding [Dressing changes] : dressing changes [Foot Care] : foot care [Skin Care] : skin care [Signs and symptoms of infection] : sign and symptoms of infection [Nutrition] : nutrition [How and When to Call] : how and when to call [Off-loading] : off-loading [Patient responsibility to plan of care] : patient responsibility to plan of care [Glycemic Control] : glycemic control [] : Yes [Stable] : stable [Home] : Home [Cane] : Cane [FreeTextEntry2] : Infection Prevention Foot and nail care Nutrition and wound healing Pt Demonstrates use of both nonpharmacological and pharmacological pain relief strategies. HBOT WEIGHT REDUCTION STRATEGIES  [FreeTextEntry4] : 6/30 HBOT completed to date F/U Daily for HBOT F/U 2 Weeks for assessment.

## 2023-08-31 NOTE — HISTORY OF PRESENT ILLNESS
[FreeTextEntry1] : Patient is 73 year old female presenting for follow up of DFU3 plantar right foot down to the level of fat. Patient has been changing the dressing as advised. Denies any pain to the right foot.

## 2023-09-01 NOTE — PROCEDURE
[Outpatient] : Outpatient [Cane] : cane [Wheelchair] : wheelchair [THIS CHAMBER HAS BEEN CLEANED / DISINFECTED] : This chamber has been cleaned / disinfected according to local and hospital policy and procedure prior to this treatment. [Patient demonstrated and verbalized proper technique for using air break mask] : Patient demonstrated and verbalized proper technique for using air break mask [Patient educated on the risks of SMOKING prior to HBOT with understanding] : Patient educated on the risks of SMOKING prior to HBOT with understanding [Patient educated on the risks of CONSUMING ALCOHOL prior to HBOT with understanding] : Patient educated on the risks of CONSUMING ALCOHOL prior to HBOT with understanding [100% Cotton] : 100% cotton [Empty all pockets] : empty all pockets [No hair oils, wigs, hairpieces, pins] : no hair oils, wigs, hairpieces, pins  [Pre tx medications] : pre tx medications  [No make-up, creams] : no make-up, creams  [No jewelry] : no jewelry  [No matches, cigarettes, lighters] : no matches, cigarettes, lighters  [Hearing aid removed] : hearing aid removed [Dentures removed] : dentures removed [Ground bracelet on pt's wrist] : ground bracelet on pt's wrist  [Contacts removed] : contacts removed  [Remove nail polish] : remove nail polish  [No reading material] : no reading material  [Bra, undergarments removed] : bra, undergarments removed  [No contraindicated dressings] : no contraindicated dressings [Ground Wire - VISUAL Verification - Intact/Free of Obstruction] : Ground Wire - VISUAL Verification - Intact/Free of Obstruction  [Ground Continuity - Verified < 1 ohm w/ Wrist Strap Channing] : Ground Continuity - Verified < 1 ohm w/ Wrist Strap Channing [Number: ___] : Number: [unfilled] [Diagnosis: ___] : Diagnosis: [unfilled] [____] : Post-Dive: Time - [unfilled] [___] : Post-Dive: Value - [unfilled] mg/dL [Clear all fields] : clear all fields [] : No [FreeTextEntry1] : CHAMBER 2  [FreeTextEntry3] : 90 [FreeTextEntry5] : 0351 [FreeTextEnsht2] : 7388 [FreeTextEntry9] : 8605 [de-identified] : 1412 [de-identified] : 108

## 2023-09-01 NOTE — ADDENDUM
[FreeTextEntry1] : Patient arrived to HBOT suite safely. Patient vitals assessed prior to descent to reveal stable values for treatment. Patient dive orders verified prior to descent. CHAMBER #2 Patient descended to 2.0 EULALIO  @ 1.75 PSI/Min compression with no discomfort or intervention required. Patient resting comfortably at Rx depth with equal and adequate chest rise and fall noted throughout. Patient ascended to surface over a nine minute decompression with no discomfort or intervention required. Patient vitals assessed post-treatment to reveal stable values for departure. Patient exited HBOT suite safely. NOTHING FURTHER TO REPORT.

## 2023-09-05 NOTE — PROCEDURE
[Outpatient] : Outpatient [Ambulatory] : Patient is ambulatory. [Walker] : walker [THIS CHAMBER HAS BEEN CLEANED / DISINFECTED] : This chamber has been cleaned / disinfected according to local and hospital policy and procedure prior to this treatment. [Patient demonstrated and verbalized proper technique for using air break mask] : Patient demonstrated and verbalized proper technique for using air break mask [Patient educated on the risks of SMOKING prior to HBOT with understanding] : Patient educated on the risks of SMOKING prior to HBOT with understanding [Patient educated on the risks of CONSUMING ALCOHOL prior to HBOT with understanding] : Patient educated on the risks of CONSUMING ALCOHOL prior to HBOT with understanding [100% Cotton] : 100% cotton [Empty all pockets] : empty all pockets [No hair oils, wigs, hairpieces, pins] : no hair oils, wigs, hairpieces, pins  [Pre tx medications] : pre tx medications  [No make-up, creams] : no make-up, creams  [No jewelry] : no jewelry  [No matches, cigarettes, lighters] : no matches, cigarettes, lighters  [Hearing aid removed] : hearing aid removed [Dentures removed] : dentures removed [Ground bracelet on pt's wrist] : ground bracelet on pt's wrist  [Contacts removed] : contacts removed  [Remove nail polish] : remove nail polish  [No reading material] : no reading material  [Bra, undergarments removed] : bra, undergarments removed  [No contraindicated dressings] : no contraindicated dressings [Ground Wire - VISUAL Verification - Intact/Free of Obstruction] : Ground Wire - VISUAL Verification - Intact/Free of Obstruction  [Ground Continuity - Verified < 1 ohm w/ Wrist Strap Channing] : Ground Continuity - Verified < 1 ohm w/ Wrist Strap Channing [Number: ___] : Number: [unfilled] [Diagnosis: ___] : Diagnosis: [unfilled] [____] : Post-Dive: Time - [unfilled] [___] : Post-Dive: Value - [unfilled] mg/dL [Clear all fields] : clear all fields [] : No [FreeTextEntry3] : 90 [FreeTextEntry5] : 1143 [FreeTextEntry7] : 8305 [FreeHouston [facility] HospitaltEntry9] : 0829 [de-identified] : 0348 [de-identified] : 108

## 2023-09-05 NOTE — ADDENDUM
[FreeTextEntry1] : Patient arrived to HBOT suite safely. Patient vitals assessed prior to descent to reveal stable values for treatment. Patient dive orders verified prior to descent. CHAMBER #4 Patient descended to 2.0 EULALIO  @ 1.75 PSI/Min compression with no discomfort or intervention required. Patient resting comfortably at Rx depth with equal and adequate chest rise and fall noted throughout. Patient ascended to surface over a nine minute decompression with no discomfort or intervention required. Patient vitals assessed post-treatment to reveal stable values for departure. Patient exited HBOT suite safely. NOTHING FURTHER TO REPORT.

## 2023-09-07 NOTE — HISTORY OF PRESENT ILLNESS
[FreeTextEntry1] : Patient is 73 year old female presenting for wound to the right foot plantar 1st metatarsal down to fat - noted with progress of healing. Patient is currently in HBOT.

## 2023-09-07 NOTE — PROCEDURE
[Outpatient] : Outpatient [Ambulatory] : Patient is ambulatory. [Cane] : cane [THIS CHAMBER HAS BEEN CLEANED / DISINFECTED] : This chamber has been cleaned / disinfected according to local and hospital policy and procedure prior to this treatment. [____] : Pre-Dive: Time - [unfilled] [___] : Pre-Dive: Value - [unfilled] mg/dL [Patient demonstrated and verbalized proper technique for using air break mask] : Patient demonstrated and verbalized proper technique for using air break mask [Patient educated on the risks of SMOKING prior to HBOT with understanding] : Patient educated on the risks of SMOKING prior to HBOT with understanding [Patient educated on the risks of CONSUMING ALCOHOL prior to HBOT with understanding] : Patient educated on the risks of CONSUMING ALCOHOL prior to HBOT with understanding [100% Cotton] : 100% cotton [Empty all pockets] : empty all pockets [No hair oils, wigs, hairpieces, pins] : no hair oils, wigs, hairpieces, pins  [Pre tx medications] : pre tx medications  [No make-up, creams] : no make-up, creams  [No jewelry] : no jewelry  [No matches, cigarettes, lighters] : no matches, cigarettes, lighters  [Hearing aid removed] : hearing aid removed [Dentures removed] : dentures removed [Contacts removed] : contacts removed  [Remove nail polish] : remove nail polish  [No reading material] : no reading material  [Bra, undergarments removed] : bra, undergarments removed  [No contraindicated dressings] : no contraindicated dressings [Clear all fields] : clear all fields [Ground bracelet on pt's wrist] : ground bracelet on pt's wrist  [Ground Wire - VISUAL Verification - Intact/Free of Obstruction] : Ground Wire - VISUAL Verification - Intact/Free of Obstruction  [Ground Continuity - Verified < 1 ohm w/ Wrist Strap Channing] : Ground Continuity - Verified < 1 ohm w/ Wrist Strap Channing [Number: ___] : Number: [unfilled] [Diagnosis: ___] : Diagnosis: [unfilled] [] : No [FreeTextEntry1] : Chamber 4- #122 [FreeTextEntry3] : 90 [FreeTextEntry5] : 3158 [FreeTextEntry7] : 5433 [FreeTextEntry9] : 6019 [de-identified] : 2108 [de-identified] : 108

## 2023-09-07 NOTE — ADDENDUM
[FreeTextEntry1] : Patient arrived ambulatory with cane. Tx order verified.  Patient descended to 2.0 EULALIO @ 1.75 PSI/Min without incident. Patient resting comfortably @ depth. Chest rise observed throughout tx.  Care transferred to .  Patient resting comfortably at Rx depth with equal and adequate chest rise and fall noted throughout. Patient ascended to surface over a nine minute decompression with no discomfort or intervention required. Patient vitals assessed post-treatment to reveal stable values for departure. Patient exited HBOT suite safely. NOTHING FURTHER TO REPORT.

## 2023-09-07 NOTE — REVIEW OF SYSTEMS
[Fever] : no fever [Chills] : no chills [Eye Pain] : no eye pain [Earache] : no earache [Loss Of Hearing] : no hearing loss [Chest Pain] : no chest pain [Shortness Of Breath] : no shortness of breath [Cough] : no cough [Abdominal Pain] : no abdominal pain [Joint Stiffness] : joint stiffness [Skin Wound] : skin wound [Anxiety] : no anxiety [Easy Bleeding] : no tendency for easy bleeding [FreeTextEntry2] : morbid obesity [FreeTextEntry5] : HTN , HLD [FreeTextEntry9] : morbid obesity  [de-identified] : right foot plantar  1st metatarsal, DFU 3  down to the level of fat [de-identified] : diabetic neuropathy [de-identified] : KENNY

## 2023-09-07 NOTE — ASSESSMENT
[Verbal] : Verbal [Patient] : Patient [Good - alert, interested, motivated] : Good - alert, interested, motivated [Demonstrates independently] : demonstrates independently [Dressing changes] : dressing changes [Skin Care] : skin care [Signs and symptoms of infection] : sign and symptoms of infection [Nutrition] : nutrition [How and When to Call] : how and when to call [Pain Management] : pain management [Patient responsibility to plan of care] : patient responsibility to plan of care [Stable] : stable [Home] : Home [Not Applicable - Long Term Care/Home Health Agency] : Long Term Care/Home Health Agency: Not Applicable [] : Yes [Cane] : Cane [FreeTextEntry2] : Infection Prevention Foot and nail care Nutrition and wound healing Pt Demonstrates use of both nonpharmacological and pharmacological pain relief strategies. HBOT WEIGHT REDUCTION STRATEGIES.  [FreeTextEntry4] : DPM shaved callous around wound. Per DPM, wound still open, fissure present. Continue HBOT. DPM wants Anni (because of the fissure; depth of 0.2) and dry dressing over.  Pt has 2 children to take care of and can't come back for HBOT until Friday. Pt has 15 sessions of HBOT left to go. F/U in 1 week

## 2023-09-07 NOTE — PROCEDURE
[Walker] : walker [Clear all fields] : clear all fields [____] : Post-Dive: Time - [unfilled] [___] : Post-Dive: Value - [unfilled] mg/dL [] : No [FreeTextEntry1] : Chamber 1-#136 [FreeTextEntry3] : 90 [FreeTextEntry5] : 0523 [FreeTextEnrhh3] : 3369 [FreeTextEntry9] : 0310 [de-identified] : 9275 [de-identified] : 108

## 2023-09-07 NOTE — PHYSICAL EXAM
[2 x 2] : 2 x 2  [2+] : left 2+ [Ankle Swelling (On Exam)] : not present [Varicose Veins Of Lower Extremities] : not present [] : not present [Skin Ulcer] : ulcer [Alert] : alert [Oriented to Person] : oriented to person [Oriented to Place] : oriented to place [Oriented to Time] : oriented to time [Calm] : calm [de-identified] : A&Ox3, NAD [de-identified] : HTN, diabetic small vessel disease  [de-identified] : morbid obesity  [de-identified] : s/p right foot sesamoidectomy, 3/5 strength in all quadrants bilaterally [de-identified] : right foot plantar 1st metatarsal diabetic ulcer down to the level of fat, no purulence, no edema, no erythema , no signs of infection  [de-identified] : Diminished light touch sensation bilaterally [FreeTextEntry1] : Right Plantar Foot [FreeTextEntry2] : 0.6 [FreeTextEntry3] : 0.1 [FreeTextEntry4] : 0.2 [de-identified] : Sanguineous [de-identified] : Dry callous - DPM shaved [de-identified] : Anni [de-identified] : Mechanically cleansed with sterile gauze and normal saline 0.9% Kerlix [TWNoteComboBox4] : Small [TWNoteComboBox5] : No [de-identified] : No [de-identified] : Normal [de-identified] : None [de-identified] : None [de-identified] : 100% [de-identified] : No [de-identified] : Every other day [de-identified] : Primary Dressing

## 2023-09-07 NOTE — ADDENDUM
[FreeTextEntry1] : PT ARRIVED AMBULATORY WITH ASSITANCE FROM WALKER PT RX TREATMENT VERIFIED PT CONTRINDICATION AND PRE CHECK LIST VERIFIED PRIOR TO TREATMENT WITH PT  PT VITALS ALL WITHIN NORMAL LIMITS  PT DECEND TO 2.0 DEPTH @1.75 RATE W/O INCIDENT  PT RESTING COMFORTABLY AT DEPTH  CHEST RISE AND FALL OBSERVED THROUGH OUT.  Care transferred to  @1400 Pt ascended to surface without incident Pt exited HBOT suite in wheelchair

## 2023-09-08 NOTE — PROCEDURE
[Outpatient] : Outpatient [Ambulatory] : Patient is ambulatory. [THIS CHAMBER HAS BEEN CLEANED / DISINFECTED] : This chamber has been cleaned / disinfected according to local and hospital policy and procedure prior to this treatment. [Patient demonstrated and verbalized proper technique for using air break mask] : Patient demonstrated and verbalized proper technique for using air break mask [Patient educated on the risks of SMOKING prior to HBOT with understanding] : Patient educated on the risks of SMOKING prior to HBOT with understanding [Patient educated on the risks of CONSUMING ALCOHOL prior to HBOT with understanding] : Patient educated on the risks of CONSUMING ALCOHOL prior to HBOT with understanding [100% Cotton] : 100% cotton [Empty all pockets] : empty all pockets [No hair oils, wigs, hairpieces, pins] : no hair oils, wigs, hairpieces, pins  [Pre tx medications] : pre tx medications  [No make-up, creams] : no make-up, creams  [No jewelry] : no jewelry  [No matches, cigarettes, lighters] : no matches, cigarettes, lighters  [Hearing aid removed] : hearing aid removed [Dentures removed] : dentures removed [Ground bracelet on pt's wrist] : ground bracelet on pt's wrist  [Contacts removed] : contacts removed  [Remove nail polish] : remove nail polish  [No reading material] : no reading material  [Bra, undergarments removed] : bra, undergarments removed  [No contraindicated dressings] : no contraindicated dressings [Ground Wire - VISUAL Verification - Intact/Free of Obstruction] : Ground Wire - VISUAL Verification - Intact/Free of Obstruction  [Ground Continuity - Verified < 1 ohm w/ Wrist Strap Channing] : Ground Continuity - Verified < 1 ohm w/ Wrist Strap Channing [Number: ___] : Number: [unfilled] [Diagnosis: ___] : Diagnosis: [unfilled] [____] : Post-Dive: Time - [unfilled] [___] : Post-Dive: Value - [unfilled] mg/dL [Clear all fields] : clear all fields [] : No [FreeTextEntry3] : 90 [FreeTextEntry5] : 12:28 [FreeTextEntry7] : 12:37 [FreeTextEntry9] : 6508 [de-identified] : 3409 [de-identified] : 108

## 2023-09-08 NOTE — ADDENDUM
[FreeTextEntry1] : PT ARRIVED TO [facility] SUITE AMBULATORY  PT IS A&Ox3  PT TREATMENT ORDER VERIFED  PT VITALS ALL WITHIN NORMAL LIMITS FOR HBOT PT CONTRINDICATION AND CHECK LIST VEIRIED WITH PT  PT DECENTED TO 2.0 EULALIO @RATE OF 1.75 PSI/MIN W/O INCIDENT  PT RESTING COMFORTABLY AT DEPTH WITH CHEST RISE AND FALL OBSERVED THROUGH OUT TREATMENT.  PT ACENTED TO SURFACE W/O INCIDENT  WOUND CARE TO FOLLOW HBOT

## 2023-09-09 NOTE — ADDENDUM
[FreeTextEntry1] : Pt arrived to HBOT suite with cane Pt order verified prior to tx Pt descended to 2.0 EULALIO over 9 minute compression without incident Pt resting comfortably @ depth  Pt chest rise and fall observed  Pt ascended to surface without incident Pt exited HBOT suite with cane

## 2023-09-09 NOTE — PROCEDURE
[Outpatient] : Outpatient [Cane] : cane [Patient demonstrated and verbalized proper technique for using air break mask] : Patient demonstrated and verbalized proper technique for using air break mask [Patient educated on the risks of SMOKING prior to HBOT with understanding] : Patient educated on the risks of SMOKING prior to HBOT with understanding [Patient educated on the risks of CONSUMING ALCOHOL prior to HBOT with understanding] : Patient educated on the risks of CONSUMING ALCOHOL prior to HBOT with understanding [100% Cotton] : 100% cotton [Empty all pockets] : empty all pockets [No hair oils, wigs, hairpieces, pins] : no hair oils, wigs, hairpieces, pins  [Pre tx medications] : pre tx medications  [No make-up, creams] : no make-up, creams  [No jewelry] : no jewelry  [No matches, cigarettes, lighters] : no matches, cigarettes, lighters  [Hearing aid removed] : hearing aid removed [Dentures removed] : dentures removed [Ground bracelet on pt's wrist] : ground bracelet on pt's wrist  [Contacts removed] : contacts removed  [Remove nail polish] : remove nail polish  [No reading material] : no reading material  [Bra, undergarments removed] : bra, undergarments removed  [No contraindicated dressings] : no contraindicated dressings [Ground Wire - VISUAL Verification - Intact/Free of Obstruction] : Ground Wire - VISUAL Verification - Intact/Free of Obstruction  [Ground Continuity - Verified < 1 ohm w/ Wrist Strap Channing] : Ground Continuity - Verified < 1 ohm w/ Wrist Strap Channing [Clear all fields] : clear all fields [Number: ___] : Number: [unfilled] [Diagnosis: ___] : Diagnosis: [unfilled] [THIS CHAMBER HAS BEEN CLEANED / DISINFECTED] : This chamber has been cleaned / disinfected according to local and hospital policy and procedure prior to this treatment. [____] : Post-Dive: Time - [unfilled] [___] : Post-Dive: Value - [unfilled] mg/dL [] : No [FreeTextEntry1] : Chamber 1 [FreeTextEntry3] : 90 [FreeTextEntry5] : 9326 [FreeTextEntry7] : 3560 [FreeTextEntry9] : 7072 [de-identified] : 2988 [de-identified] : 108

## 2023-09-19 PROBLEM — N17.9 AKI (ACUTE KIDNEY INJURY): Status: RESOLVED | Noted: 2023-01-01 | Resolved: 2023-01-01

## 2023-09-19 PROBLEM — A41.9 SEPSIS DUE TO URINARY TRACT INFECTION: Status: RESOLVED | Noted: 2023-01-01 | Resolved: 2023-01-01

## 2023-09-20 PROBLEM — E66.01 MORBIDLY OBESE: Status: ACTIVE | Noted: 2022-04-12

## 2023-09-20 PROBLEM — E11.9 TYPE 2 DIABETES MELLITUS: Status: ACTIVE | Noted: 2021-01-29

## 2023-09-28 PROBLEM — D64.9 LOW HEMOGLOBIN: Status: ACTIVE | Noted: 2023-01-01

## 2023-12-03 PROBLEM — E11.40 CHRONIC PAINFUL DIABETIC NEUROPATHY: Status: ACTIVE | Noted: 2021-01-29

## 2024-01-01 ENCOUNTER — TRANSCRIPTION ENCOUNTER (OUTPATIENT)
Age: 74
End: 2024-01-01

## 2024-01-01 ENCOUNTER — RX RENEWAL (OUTPATIENT)
Age: 74
End: 2024-01-01

## 2024-01-01 ENCOUNTER — NON-APPOINTMENT (OUTPATIENT)
Age: 74
End: 2024-01-01

## 2024-01-01 ENCOUNTER — APPOINTMENT (OUTPATIENT)
Dept: INTERNAL MEDICINE | Facility: CLINIC | Age: 74
End: 2024-01-01

## 2024-01-01 ENCOUNTER — EMERGENCY (EMERGENCY)
Facility: HOSPITAL | Age: 74
LOS: 1 days | Discharge: HOME | End: 2024-01-01
Attending: EMERGENCY MEDICINE | Admitting: EMERGENCY MEDICINE
Payer: MEDICARE

## 2024-01-01 ENCOUNTER — RESULT REVIEW (OUTPATIENT)
Age: 74
End: 2024-01-01

## 2024-01-01 ENCOUNTER — INPATIENT (INPATIENT)
Facility: HOSPITAL | Age: 74
LOS: 3 days | Discharge: ANOTHER HOSPITAL | DRG: 316 | End: 2024-06-18
Attending: INTERNAL MEDICINE | Admitting: INTERNAL MEDICINE
Payer: MEDICARE

## 2024-01-01 ENCOUNTER — APPOINTMENT (OUTPATIENT)
Dept: INTERNAL MEDICINE | Facility: CLINIC | Age: 74
End: 2024-01-01
Payer: MEDICARE

## 2024-01-01 ENCOUNTER — APPOINTMENT (OUTPATIENT)
Dept: ELECTROPHYSIOLOGY | Facility: CLINIC | Age: 74
End: 2024-01-01

## 2024-01-01 ENCOUNTER — APPOINTMENT (OUTPATIENT)
Dept: WOUND CARE | Facility: HOSPITAL | Age: 74
End: 2024-01-01
Payer: MEDICARE

## 2024-01-01 ENCOUNTER — RESULT CHARGE (OUTPATIENT)
Age: 74
End: 2024-01-01

## 2024-01-01 ENCOUNTER — OUTPATIENT (OUTPATIENT)
Dept: OUTPATIENT SERVICES | Facility: HOSPITAL | Age: 74
LOS: 1 days | Discharge: HOME | End: 2024-01-01
Payer: MEDICARE

## 2024-01-01 ENCOUNTER — APPOINTMENT (OUTPATIENT)
Dept: CARDIOTHORACIC SURGERY | Facility: HOSPITAL | Age: 74
End: 2024-01-01

## 2024-01-01 ENCOUNTER — INPATIENT (INPATIENT)
Facility: HOSPITAL | Age: 74
LOS: 4 days | Discharge: HOME | DRG: 690 | End: 2024-01-13
Attending: INTERNAL MEDICINE
Payer: MEDICARE

## 2024-01-01 ENCOUNTER — INPATIENT (INPATIENT)
Facility: HOSPITAL | Age: 74
LOS: 6 days | Discharge: HOME | DRG: 291 | End: 2024-04-05
Attending: FAMILY MEDICINE | Admitting: STUDENT IN AN ORGANIZED HEALTH CARE EDUCATION/TRAINING PROGRAM
Payer: MEDICARE

## 2024-01-01 ENCOUNTER — APPOINTMENT (OUTPATIENT)
Dept: CARDIOLOGY | Facility: CLINIC | Age: 74
End: 2024-01-01
Payer: MEDICARE

## 2024-01-01 ENCOUNTER — INPATIENT (INPATIENT)
Facility: HOSPITAL | Age: 74
LOS: 4 days | DRG: 872 | End: 2024-06-23
Attending: INTERNAL MEDICINE | Admitting: STUDENT IN AN ORGANIZED HEALTH CARE EDUCATION/TRAINING PROGRAM
Payer: MEDICARE

## 2024-01-01 ENCOUNTER — INPATIENT (INPATIENT)
Facility: HOSPITAL | Age: 74
LOS: 62 days | Discharge: NURSING, REHAB OR LONG-TERM CARE | DRG: 312 | End: 2024-06-14
Attending: INTERNAL MEDICINE | Admitting: INTERNAL MEDICINE
Payer: MEDICARE

## 2024-01-01 VITALS
OXYGEN SATURATION: 92 % | TEMPERATURE: 99 F | RESPIRATION RATE: 18 BRPM | DIASTOLIC BLOOD PRESSURE: 49 MMHG | HEIGHT: 71 IN | WEIGHT: 293 LBS | SYSTOLIC BLOOD PRESSURE: 87 MMHG | HEART RATE: 87 BPM

## 2024-01-01 VITALS
OXYGEN SATURATION: 91 % | RESPIRATION RATE: 17 BRPM | DIASTOLIC BLOOD PRESSURE: 72 MMHG | HEART RATE: 78 BPM | TEMPERATURE: 98 F | SYSTOLIC BLOOD PRESSURE: 122 MMHG

## 2024-01-01 VITALS
DIASTOLIC BLOOD PRESSURE: 70 MMHG | BODY MASS INDEX: 41.02 KG/M2 | HEIGHT: 71 IN | RESPIRATION RATE: 16 BRPM | SYSTOLIC BLOOD PRESSURE: 132 MMHG | HEART RATE: 62 BPM | OXYGEN SATURATION: 95 % | WEIGHT: 293 LBS

## 2024-01-01 VITALS
SYSTOLIC BLOOD PRESSURE: 136 MMHG | WEIGHT: 293 LBS | HEART RATE: 60 BPM | RESPIRATION RATE: 18 BRPM | DIASTOLIC BLOOD PRESSURE: 66 MMHG | TEMPERATURE: 97 F | OXYGEN SATURATION: 99 %

## 2024-01-01 VITALS
DIASTOLIC BLOOD PRESSURE: 51 MMHG | RESPIRATION RATE: 16 BRPM | HEART RATE: 105 BPM | OXYGEN SATURATION: 92 % | SYSTOLIC BLOOD PRESSURE: 89 MMHG

## 2024-01-01 VITALS — HEART RATE: 65 BPM | HEIGHT: 71 IN | OXYGEN SATURATION: 93 %

## 2024-01-01 VITALS — RESPIRATION RATE: 20 BRPM | TEMPERATURE: 100 F

## 2024-01-01 VITALS
OXYGEN SATURATION: 95 % | SYSTOLIC BLOOD PRESSURE: 137 MMHG | HEART RATE: 56 BPM | HEIGHT: 71 IN | RESPIRATION RATE: 16 BRPM | BODY MASS INDEX: 41.02 KG/M2 | DIASTOLIC BLOOD PRESSURE: 78 MMHG | TEMPERATURE: 97.9 F | WEIGHT: 293 LBS

## 2024-01-01 VITALS
HEART RATE: 51 BPM | SYSTOLIC BLOOD PRESSURE: 124 MMHG | RESPIRATION RATE: 18 BRPM | HEIGHT: 71 IN | WEIGHT: 293 LBS | TEMPERATURE: 98 F | OXYGEN SATURATION: 99 % | DIASTOLIC BLOOD PRESSURE: 63 MMHG

## 2024-01-01 VITALS
RESPIRATION RATE: 18 BRPM | HEIGHT: 71 IN | SYSTOLIC BLOOD PRESSURE: 165 MMHG | HEART RATE: 79 BPM | WEIGHT: 293 LBS | TEMPERATURE: 98 F | OXYGEN SATURATION: 98 % | DIASTOLIC BLOOD PRESSURE: 69 MMHG

## 2024-01-01 VITALS
WEIGHT: 293 LBS | TEMPERATURE: 97 F | OXYGEN SATURATION: 95 % | DIASTOLIC BLOOD PRESSURE: 53 MMHG | SYSTOLIC BLOOD PRESSURE: 132 MMHG | HEART RATE: 60 BPM | RESPIRATION RATE: 16 BRPM | HEIGHT: 71 IN

## 2024-01-01 VITALS
RESPIRATION RATE: 16 BRPM | SYSTOLIC BLOOD PRESSURE: 124 MMHG | HEART RATE: 60 BPM | TEMPERATURE: 98 F | DIASTOLIC BLOOD PRESSURE: 56 MMHG | OXYGEN SATURATION: 95 %

## 2024-01-01 VITALS
TEMPERATURE: 98 F | HEIGHT: 71 IN | SYSTOLIC BLOOD PRESSURE: 122 MMHG | DIASTOLIC BLOOD PRESSURE: 78 MMHG | RESPIRATION RATE: 18 BRPM | WEIGHT: 293 LBS | OXYGEN SATURATION: 98 % | HEART RATE: 66 BPM

## 2024-01-01 VITALS — TEMPERATURE: 97 F

## 2024-01-01 VITALS — HEART RATE: 58 BPM | OXYGEN SATURATION: 98 % | TEMPERATURE: 97 F | RESPIRATION RATE: 18 BRPM

## 2024-01-01 DIAGNOSIS — I50.23 ACUTE ON CHRONIC SYSTOLIC (CONGESTIVE) HEART FAILURE: ICD-10-CM

## 2024-01-01 DIAGNOSIS — L97.519 TYPE 2 DIABETES MELLITUS WITH FOOT ULCER: ICD-10-CM

## 2024-01-01 DIAGNOSIS — M75.00 ADHESIVE CAPSULITIS OF UNSPECIFIED SHOULDER: Chronic | ICD-10-CM

## 2024-01-01 DIAGNOSIS — G47.33 OBSTRUCTIVE SLEEP APNEA (ADULT) (PEDIATRIC): ICD-10-CM

## 2024-01-01 DIAGNOSIS — Z98.890 OTHER SPECIFIED POSTPROCEDURAL STATES: Chronic | ICD-10-CM

## 2024-01-01 DIAGNOSIS — Z29.9 ENCOUNTER FOR PROPHYLACTIC MEASURES, UNSPECIFIED: ICD-10-CM

## 2024-01-01 DIAGNOSIS — E11.40 TYPE 2 DIABETES MELLITUS WITH DIABETIC NEUROPATHY, UNSPECIFIED: ICD-10-CM

## 2024-01-01 DIAGNOSIS — S02.91XA UNSPECIFIED FRACTURE OF SKULL, INITIAL ENCOUNTER FOR CLOSED FRACTURE: Chronic | ICD-10-CM

## 2024-01-01 DIAGNOSIS — D72.829 ELEVATED WHITE BLOOD CELL COUNT, UNSPECIFIED: ICD-10-CM

## 2024-01-01 DIAGNOSIS — A41.9 SEPSIS, UNSPECIFIED ORGANISM: ICD-10-CM

## 2024-01-01 DIAGNOSIS — R40.20 UNSPECIFIED COMA: ICD-10-CM

## 2024-01-01 DIAGNOSIS — N17.9 ACUTE KIDNEY FAILURE, UNSPECIFIED: ICD-10-CM

## 2024-01-01 DIAGNOSIS — R23.4 CHANGES IN SKIN TEXTURE: ICD-10-CM

## 2024-01-01 DIAGNOSIS — E11.22 TYPE 2 DIABETES MELLITUS WITH DIABETIC CHRONIC KIDNEY DISEASE: ICD-10-CM

## 2024-01-01 DIAGNOSIS — Z79.4 TYPE 2 DIABETES MELLITUS WITH DIABETIC CHRONIC KIDNEY DISEASE: ICD-10-CM

## 2024-01-01 DIAGNOSIS — N18.9 CHRONIC KIDNEY DISEASE, UNSPECIFIED: ICD-10-CM

## 2024-01-01 DIAGNOSIS — Z86.69 PERSONAL HISTORY OF OTHER DISEASES OF THE NERVOUS SYSTEM AND SENSE ORGANS: Chronic | ICD-10-CM

## 2024-01-01 DIAGNOSIS — E66.01 MORBID (SEVERE) OBESITY DUE TO EXCESS CALORIES: ICD-10-CM

## 2024-01-01 DIAGNOSIS — I44.7 LEFT BUNDLE-BRANCH BLOCK, UNSPECIFIED: ICD-10-CM

## 2024-01-01 DIAGNOSIS — E11.9 TYPE 2 DIABETES MELLITUS WITHOUT COMPLICATIONS: ICD-10-CM

## 2024-01-01 DIAGNOSIS — I50.9 HEART FAILURE, UNSPECIFIED: ICD-10-CM

## 2024-01-01 DIAGNOSIS — N39.0 URINARY TRACT INFECTION, SITE NOT SPECIFIED: ICD-10-CM

## 2024-01-01 DIAGNOSIS — K21.9 GASTRO-ESOPHAGEAL REFLUX DISEASE WITHOUT ESOPHAGITIS: ICD-10-CM

## 2024-01-01 DIAGNOSIS — E11.621 TYPE 2 DIABETES MELLITUS WITH FOOT ULCER: ICD-10-CM

## 2024-01-01 DIAGNOSIS — E03.9 HYPOTHYROIDISM, UNSPECIFIED: ICD-10-CM

## 2024-01-01 DIAGNOSIS — E11.51 TYPE 2 DIABETES MELLITUS WITH DIABETIC PERIPHERAL ANGIOPATHY WITHOUT GANGRENE: ICD-10-CM

## 2024-01-01 DIAGNOSIS — D64.9 ANEMIA, UNSPECIFIED: ICD-10-CM

## 2024-01-01 DIAGNOSIS — Z88.8 ALLERGY STATUS TO OTHER DRUGS, MEDICAMENTS AND BIOLOGICAL SUBSTANCES: ICD-10-CM

## 2024-01-01 DIAGNOSIS — R55 SYNCOPE AND COLLAPSE: ICD-10-CM

## 2024-01-01 DIAGNOSIS — I35.0 NONRHEUMATIC AORTIC (VALVE) STENOSIS: ICD-10-CM

## 2024-01-01 DIAGNOSIS — E10.9 TYPE 1 DIABETES MELLITUS WITHOUT COMPLICATIONS: ICD-10-CM

## 2024-01-01 DIAGNOSIS — I10 ESSENTIAL (PRIMARY) HYPERTENSION: ICD-10-CM

## 2024-01-01 DIAGNOSIS — Z86.19 PERSONAL HISTORY OF OTHER INFECTIOUS AND PARASITIC DISEASES: ICD-10-CM

## 2024-01-01 DIAGNOSIS — R82.90 UNSPECIFIED ABNORMAL FINDINGS IN URINE: ICD-10-CM

## 2024-01-01 DIAGNOSIS — E10.649 TYPE 1 DIABETES MELLITUS WITH HYPOGLYCEMIA WITHOUT COMA: ICD-10-CM

## 2024-01-01 DIAGNOSIS — I27.20 PULMONARY HYPERTENSION, UNSPECIFIED: ICD-10-CM

## 2024-01-01 DIAGNOSIS — E27.8 OTHER SPECIFIED DISORDERS OF ADRENAL GLAND: ICD-10-CM

## 2024-01-01 DIAGNOSIS — I50.20 UNSPECIFIED SYSTOLIC (CONGESTIVE) HEART FAILURE: ICD-10-CM

## 2024-01-01 DIAGNOSIS — Z82.5 FAMILY HISTORY OF ASTHMA AND OTHER CHRONIC LOWER RESPIRATORY DISEASES: ICD-10-CM

## 2024-01-01 DIAGNOSIS — L84 CORNS AND CALLOSITIES: ICD-10-CM

## 2024-01-01 DIAGNOSIS — L97.412 NON-PRESSURE CHRONIC ULCER OF RIGHT HEEL AND MIDFOOT WITH FAT LAYER EXPOSED: ICD-10-CM

## 2024-01-01 DIAGNOSIS — K92.2 GASTROINTESTINAL HEMORRHAGE, UNSPECIFIED: ICD-10-CM

## 2024-01-01 DIAGNOSIS — Z83.3 FAMILY HISTORY OF DIABETES MELLITUS: ICD-10-CM

## 2024-01-01 DIAGNOSIS — I11.0 HYPERTENSIVE HEART DISEASE WITH HEART FAILURE: ICD-10-CM

## 2024-01-01 DIAGNOSIS — E11.69 TYPE 2 DIABETES MELLITUS WITH OTHER SPECIFIED COMPLICATION: ICD-10-CM

## 2024-01-01 DIAGNOSIS — Z79.899 OTHER LONG TERM (CURRENT) DRUG THERAPY: ICD-10-CM

## 2024-01-01 DIAGNOSIS — E88.9 METABOLIC DISORDER, UNSPECIFIED: ICD-10-CM

## 2024-01-01 DIAGNOSIS — R78.81 BACTEREMIA: ICD-10-CM

## 2024-01-01 DIAGNOSIS — Z88.0 ALLERGY STATUS TO PENICILLIN: ICD-10-CM

## 2024-01-01 DIAGNOSIS — I95.1 ORTHOSTATIC HYPOTENSION: ICD-10-CM

## 2024-01-01 DIAGNOSIS — E78.5 HYPERLIPIDEMIA, UNSPECIFIED: ICD-10-CM

## 2024-01-01 DIAGNOSIS — Z95.2 PRESENCE OF PROSTHETIC HEART VALVE: ICD-10-CM

## 2024-01-01 DIAGNOSIS — R60.0 LOCALIZED EDEMA: ICD-10-CM

## 2024-01-01 DIAGNOSIS — Z98.49 CATARACT EXTRACTION STATUS, UNSPECIFIED EYE: ICD-10-CM

## 2024-01-01 DIAGNOSIS — Z98.890 OTHER SPECIFIED POSTPROCEDURAL STATES: ICD-10-CM

## 2024-01-01 DIAGNOSIS — N18.4 TYPE 2 DIABETES MELLITUS WITH DIABETIC CHRONIC KIDNEY DISEASE: ICD-10-CM

## 2024-01-01 DIAGNOSIS — I50.22 CHRONIC SYSTOLIC (CONGESTIVE) HEART FAILURE: ICD-10-CM

## 2024-01-01 DIAGNOSIS — L27.0 GENERALIZED SKIN ERUPTION DUE TO DRUGS AND MEDICAMENTS TAKEN INTERNALLY: ICD-10-CM

## 2024-01-01 DIAGNOSIS — R11.2 NAUSEA WITH VOMITING, UNSPECIFIED: ICD-10-CM

## 2024-01-01 DIAGNOSIS — Z80.3 FAMILY HISTORY OF MALIGNANT NEOPLASM OF BREAST: ICD-10-CM

## 2024-01-01 DIAGNOSIS — J96.01 ACUTE RESPIRATORY FAILURE WITH HYPOXIA: ICD-10-CM

## 2024-01-01 DIAGNOSIS — I89.0 LYMPHEDEMA, NOT ELSEWHERE CLASSIFIED: ICD-10-CM

## 2024-01-01 DIAGNOSIS — I82.622 ACUTE EMBOLISM AND THROMBOSIS OF DEEP VEINS OF LEFT UPPER EXTREMITY: ICD-10-CM

## 2024-01-01 LAB
-  AMOXICILLIN/CLAVULANIC ACID: SIGNIFICANT CHANGE UP
-  AMPICILLIN/SULBACTAM: SIGNIFICANT CHANGE UP
-  AMPICILLIN: SIGNIFICANT CHANGE UP
-  AZTREONAM: SIGNIFICANT CHANGE UP
-  CEFAZOLIN: SIGNIFICANT CHANGE UP
-  CEFEPIME: SIGNIFICANT CHANGE UP
-  CEFOXITIN: SIGNIFICANT CHANGE UP
-  CEFTRIAXONE: SIGNIFICANT CHANGE UP
-  CEFUROXIME: SIGNIFICANT CHANGE UP
-  CIPROFLOXACIN: SIGNIFICANT CHANGE UP
-  CLINDAMYCIN: SIGNIFICANT CHANGE UP
-  DAPTOMYCIN: SIGNIFICANT CHANGE UP
-  ERTAPENEM: SIGNIFICANT CHANGE UP
-  ERYTHROMYCIN: SIGNIFICANT CHANGE UP
-  GENTAMICIN: SIGNIFICANT CHANGE UP
-  IMIPENEM: SIGNIFICANT CHANGE UP
-  LEVOFLOXACIN: SIGNIFICANT CHANGE UP
-  LINEZOLID: SIGNIFICANT CHANGE UP
-  MEROPENEM: SIGNIFICANT CHANGE UP
-  NITROFURANTOIN: SIGNIFICANT CHANGE UP
-  OXACILLIN: SIGNIFICANT CHANGE UP
-  PENICILLIN: SIGNIFICANT CHANGE UP
-  PIPERACILLIN/TAZOBACTAM: SIGNIFICANT CHANGE UP
-  RIFAMPIN: SIGNIFICANT CHANGE UP
-  STAPHYLOCOCCUS EPIDERMIDIS, METHICILLIN RESISTANT: SIGNIFICANT CHANGE UP
-  TETRACYCLINE: SIGNIFICANT CHANGE UP
-  TOBRAMYCIN: SIGNIFICANT CHANGE UP
-  TRIMETHOPRIM/SULFAMETHOXAZOLE: SIGNIFICANT CHANGE UP
-  VANCOMYCIN: SIGNIFICANT CHANGE UP
50/50 COAG PANEL: SIGNIFICANT CHANGE UP
A1C WITH ESTIMATED AVERAGE GLUCOSE RESULT: 6.1 % — HIGH (ref 4–5.6)
A1C WITH ESTIMATED AVERAGE GLUCOSE RESULT: 6.8 % — HIGH (ref 4–5.6)
A1C WITH ESTIMATED AVERAGE GLUCOSE RESULT: 6.8 % — HIGH (ref 4–5.6)
A1C WITH ESTIMATED AVERAGE GLUCOSE RESULT: 7.4 % — HIGH (ref 4–5.6)
ACTH SER-ACNC: 9.5 PG/ML — SIGNIFICANT CHANGE UP (ref 7.2–63.3)
ADD ON TEST-SPECIMEN IN LAB: SIGNIFICANT CHANGE UP
ALBUMIN SERPL ELPH-MCNC: 2 G/DL — LOW (ref 3.3–5)
ALBUMIN SERPL ELPH-MCNC: 2.1 G/DL — LOW (ref 3.3–5)
ALBUMIN SERPL ELPH-MCNC: 2.1 G/DL — LOW (ref 3.3–5)
ALBUMIN SERPL ELPH-MCNC: 2.2 G/DL — LOW (ref 3.3–5)
ALBUMIN SERPL ELPH-MCNC: 2.3 G/DL — LOW (ref 3.3–5)
ALBUMIN SERPL ELPH-MCNC: 2.4 G/DL — LOW (ref 3.3–5)
ALBUMIN SERPL ELPH-MCNC: 2.5 G/DL — LOW (ref 3.3–5)
ALBUMIN SERPL ELPH-MCNC: 2.6 G/DL — LOW (ref 3.3–5)
ALBUMIN SERPL ELPH-MCNC: 2.7 G/DL — LOW (ref 3.3–5)
ALBUMIN SERPL ELPH-MCNC: 2.8 G/DL — LOW (ref 3.3–5)
ALBUMIN SERPL ELPH-MCNC: 2.9 G/DL — LOW (ref 3.3–5)
ALBUMIN SERPL ELPH-MCNC: 3 G/DL — LOW (ref 3.3–5)
ALBUMIN SERPL ELPH-MCNC: 3.1 G/DL — LOW (ref 3.3–5)
ALBUMIN SERPL ELPH-MCNC: 3.2 G/DL — LOW (ref 3.3–5)
ALBUMIN SERPL ELPH-MCNC: 3.3 G/DL — SIGNIFICANT CHANGE UP (ref 3.3–5)
ALBUMIN SERPL ELPH-MCNC: 3.5 G/DL — SIGNIFICANT CHANGE UP (ref 3.3–5)
ALBUMIN SERPL ELPH-MCNC: 3.6 G/DL — SIGNIFICANT CHANGE UP (ref 3.3–5)
ALBUMIN SERPL ELPH-MCNC: 3.6 G/DL — SIGNIFICANT CHANGE UP (ref 3.3–5)
ALBUMIN SERPL ELPH-MCNC: 3.7 G/DL — SIGNIFICANT CHANGE UP (ref 3.3–5)
ALBUMIN SERPL ELPH-MCNC: 3.7 G/DL — SIGNIFICANT CHANGE UP (ref 3.3–5)
ALBUMIN SERPL ELPH-MCNC: 3.9 G/DL
ALDOST SERPL-MCNC: 37.4 NG/DL — HIGH
ALP BLD-CCNC: 69 U/L
ALP SERPL-CCNC: 300 U/L — HIGH (ref 40–120)
ALP SERPL-CCNC: 366 U/L — HIGH (ref 40–120)
ALP SERPL-CCNC: 45 U/L — SIGNIFICANT CHANGE UP (ref 40–120)
ALP SERPL-CCNC: 47 U/L — SIGNIFICANT CHANGE UP (ref 40–120)
ALP SERPL-CCNC: 50 U/L — SIGNIFICANT CHANGE UP (ref 40–120)
ALP SERPL-CCNC: 53 U/L — SIGNIFICANT CHANGE UP (ref 40–120)
ALP SERPL-CCNC: 54 U/L — SIGNIFICANT CHANGE UP (ref 40–120)
ALP SERPL-CCNC: 56 U/L — SIGNIFICANT CHANGE UP (ref 40–120)
ALP SERPL-CCNC: 57 U/L — SIGNIFICANT CHANGE UP (ref 40–120)
ALP SERPL-CCNC: 58 U/L — SIGNIFICANT CHANGE UP (ref 40–120)
ALP SERPL-CCNC: 59 U/L — SIGNIFICANT CHANGE UP (ref 40–120)
ALP SERPL-CCNC: 59 U/L — SIGNIFICANT CHANGE UP (ref 40–120)
ALP SERPL-CCNC: 60 U/L — SIGNIFICANT CHANGE UP (ref 40–120)
ALP SERPL-CCNC: 61 U/L — SIGNIFICANT CHANGE UP (ref 40–120)
ALP SERPL-CCNC: 61 U/L — SIGNIFICANT CHANGE UP (ref 40–120)
ALP SERPL-CCNC: 63 U/L — SIGNIFICANT CHANGE UP (ref 40–120)
ALP SERPL-CCNC: 64 U/L — SIGNIFICANT CHANGE UP (ref 40–120)
ALP SERPL-CCNC: 64 U/L — SIGNIFICANT CHANGE UP (ref 40–120)
ALP SERPL-CCNC: 65 U/L — SIGNIFICANT CHANGE UP (ref 40–120)
ALP SERPL-CCNC: 67 U/L — SIGNIFICANT CHANGE UP (ref 40–120)
ALP SERPL-CCNC: 67 U/L — SIGNIFICANT CHANGE UP (ref 40–120)
ALP SERPL-CCNC: 68 U/L — SIGNIFICANT CHANGE UP (ref 40–120)
ALP SERPL-CCNC: 68 U/L — SIGNIFICANT CHANGE UP (ref 40–120)
ALP SERPL-CCNC: 69 U/L — SIGNIFICANT CHANGE UP (ref 40–120)
ALP SERPL-CCNC: 69 U/L — SIGNIFICANT CHANGE UP (ref 40–120)
ALP SERPL-CCNC: 70 U/L — SIGNIFICANT CHANGE UP (ref 40–120)
ALP SERPL-CCNC: 71 U/L — SIGNIFICANT CHANGE UP (ref 40–120)
ALP SERPL-CCNC: 71 U/L — SIGNIFICANT CHANGE UP (ref 40–120)
ALP SERPL-CCNC: 72 U/L — SIGNIFICANT CHANGE UP (ref 40–120)
ALP SERPL-CCNC: 73 U/L — SIGNIFICANT CHANGE UP (ref 40–120)
ALP SERPL-CCNC: 74 U/L — SIGNIFICANT CHANGE UP (ref 40–120)
ALP SERPL-CCNC: 74 U/L — SIGNIFICANT CHANGE UP (ref 40–120)
ALP SERPL-CCNC: 75 U/L — SIGNIFICANT CHANGE UP (ref 40–120)
ALP SERPL-CCNC: 76 U/L — SIGNIFICANT CHANGE UP (ref 40–120)
ALP SERPL-CCNC: 77 U/L — SIGNIFICANT CHANGE UP (ref 40–120)
ALP SERPL-CCNC: 77 U/L — SIGNIFICANT CHANGE UP (ref 40–120)
ALP SERPL-CCNC: 78 U/L — SIGNIFICANT CHANGE UP (ref 40–120)
ALP SERPL-CCNC: 79 U/L — SIGNIFICANT CHANGE UP (ref 40–120)
ALP SERPL-CCNC: 79 U/L — SIGNIFICANT CHANGE UP (ref 40–120)
ALP SERPL-CCNC: 80 U/L — SIGNIFICANT CHANGE UP (ref 40–120)
ALP SERPL-CCNC: 81 U/L — SIGNIFICANT CHANGE UP (ref 40–120)
ALP SERPL-CCNC: 81 U/L — SIGNIFICANT CHANGE UP (ref 40–120)
ALP SERPL-CCNC: 83 U/L — SIGNIFICANT CHANGE UP (ref 40–120)
ALP SERPL-CCNC: 84 U/L — SIGNIFICANT CHANGE UP (ref 40–120)
ALP SERPL-CCNC: 85 U/L — SIGNIFICANT CHANGE UP (ref 40–120)
ALP SERPL-CCNC: 86 U/L — SIGNIFICANT CHANGE UP (ref 40–120)
ALP SERPL-CCNC: 89 U/L — SIGNIFICANT CHANGE UP (ref 40–120)
ALP SERPL-CCNC: 89 U/L — SIGNIFICANT CHANGE UP (ref 40–120)
ALP SERPL-CCNC: 91 U/L — SIGNIFICANT CHANGE UP (ref 40–120)
ALP SERPL-CCNC: 91 U/L — SIGNIFICANT CHANGE UP (ref 40–120)
ALP SERPL-CCNC: 93 U/L — SIGNIFICANT CHANGE UP (ref 40–120)
ALP SERPL-CCNC: 94 U/L — SIGNIFICANT CHANGE UP (ref 40–120)
ALP SERPL-CCNC: 95 U/L — SIGNIFICANT CHANGE UP (ref 40–120)
ALP SERPL-CCNC: 96 U/L — SIGNIFICANT CHANGE UP (ref 40–120)
ALT FLD-CCNC: 10 U/L — SIGNIFICANT CHANGE UP (ref 10–45)
ALT FLD-CCNC: 11 U/L — LOW (ref 12–78)
ALT FLD-CCNC: 11 U/L — SIGNIFICANT CHANGE UP (ref 10–45)
ALT FLD-CCNC: 12 U/L — SIGNIFICANT CHANGE UP (ref 10–45)
ALT FLD-CCNC: 13 U/L — SIGNIFICANT CHANGE UP (ref 10–45)
ALT FLD-CCNC: 13 U/L — SIGNIFICANT CHANGE UP (ref 12–78)
ALT FLD-CCNC: 14 U/L — SIGNIFICANT CHANGE UP (ref 10–45)
ALT FLD-CCNC: 14 U/L — SIGNIFICANT CHANGE UP (ref 12–78)
ALT FLD-CCNC: 17 U/L — SIGNIFICANT CHANGE UP (ref 12–78)
ALT FLD-CCNC: 17 U/L — SIGNIFICANT CHANGE UP (ref 12–78)
ALT FLD-CCNC: 18 U/L — SIGNIFICANT CHANGE UP (ref 10–45)
ALT FLD-CCNC: 22 U/L — SIGNIFICANT CHANGE UP (ref 10–45)
ALT FLD-CCNC: 26 U/L — SIGNIFICANT CHANGE UP (ref 10–45)
ALT FLD-CCNC: 37 U/L — SIGNIFICANT CHANGE UP (ref 10–45)
ALT FLD-CCNC: 46 U/L — HIGH (ref 10–45)
ALT FLD-CCNC: 49 U/L — HIGH (ref 10–45)
ALT FLD-CCNC: 49 U/L — HIGH (ref 10–45)
ALT FLD-CCNC: 5 U/L — LOW (ref 10–45)
ALT FLD-CCNC: 52 U/L — HIGH (ref 10–45)
ALT FLD-CCNC: 58 U/L — HIGH (ref 10–45)
ALT FLD-CCNC: 6 U/L — LOW (ref 10–45)
ALT FLD-CCNC: 6 U/L — LOW (ref 10–45)
ALT FLD-CCNC: 7 U/L — LOW (ref 10–45)
ALT FLD-CCNC: 8 U/L — LOW (ref 10–45)
ALT FLD-CCNC: 9 U/L — LOW (ref 10–45)
ALT SERPL-CCNC: 10 U/L
ANDROSTERONE SERPL-MCNC: <10 NG/DL — LOW (ref 17–99)
ANION GAP SERPL CALC-SCNC: 10 MMOL/L — SIGNIFICANT CHANGE UP (ref 5–17)
ANION GAP SERPL CALC-SCNC: 11 MMOL/L
ANION GAP SERPL CALC-SCNC: 11 MMOL/L — SIGNIFICANT CHANGE UP (ref 5–17)
ANION GAP SERPL CALC-SCNC: 12 MMOL/L — SIGNIFICANT CHANGE UP (ref 5–17)
ANION GAP SERPL CALC-SCNC: 13 MMOL/L — SIGNIFICANT CHANGE UP (ref 5–17)
ANION GAP SERPL CALC-SCNC: 14 MMOL/L — SIGNIFICANT CHANGE UP (ref 5–17)
ANION GAP SERPL CALC-SCNC: 15 MMOL/L — SIGNIFICANT CHANGE UP (ref 5–17)
ANION GAP SERPL CALC-SCNC: 16 MMOL/L — SIGNIFICANT CHANGE UP (ref 5–17)
ANION GAP SERPL CALC-SCNC: 17 MMOL/L — SIGNIFICANT CHANGE UP (ref 5–17)
ANION GAP SERPL CALC-SCNC: 18 MMOL/L — HIGH (ref 5–17)
ANION GAP SERPL CALC-SCNC: 19 MMOL/L — HIGH (ref 5–17)
ANION GAP SERPL CALC-SCNC: 20 MMOL/L — HIGH (ref 5–17)
ANION GAP SERPL CALC-SCNC: 20 MMOL/L — HIGH (ref 5–17)
ANION GAP SERPL CALC-SCNC: 21 MMOL/L — HIGH (ref 5–17)
ANION GAP SERPL CALC-SCNC: 24 MMOL/L — HIGH (ref 5–17)
ANION GAP SERPL CALC-SCNC: 26 MMOL/L — HIGH (ref 5–17)
ANION GAP SERPL CALC-SCNC: 3 MMOL/L — LOW (ref 5–17)
ANION GAP SERPL CALC-SCNC: 4 MMOL/L — LOW (ref 5–17)
ANION GAP SERPL CALC-SCNC: 4 MMOL/L — LOW (ref 5–17)
ANION GAP SERPL CALC-SCNC: 5 MMOL/L — SIGNIFICANT CHANGE UP (ref 5–17)
ANION GAP SERPL CALC-SCNC: 6 MMOL/L — SIGNIFICANT CHANGE UP (ref 5–17)
ANION GAP SERPL CALC-SCNC: 7 MMOL/L — SIGNIFICANT CHANGE UP (ref 5–17)
ANION GAP SERPL CALC-SCNC: 8 MMOL/L — SIGNIFICANT CHANGE UP (ref 5–17)
ANION GAP SERPL CALC-SCNC: 9 MMOL/L — SIGNIFICANT CHANGE UP (ref 5–17)
ANISOCYTOSIS BLD QL: SIGNIFICANT CHANGE UP
ANISOCYTOSIS BLD QL: SLIGHT — SIGNIFICANT CHANGE UP
ANISOCYTOSIS BLD QL: SLIGHT — SIGNIFICANT CHANGE UP
APPEARANCE UR: ABNORMAL
APPEARANCE UR: CLEAR — SIGNIFICANT CHANGE UP
APPEARANCE UR: CLEAR — SIGNIFICANT CHANGE UP
APTT 50/50 2HOUR INCUB: 32.9 SEC — SIGNIFICANT CHANGE UP (ref 24.5–36.6)
APTT BLD: 192.7 SEC — CRITICAL HIGH (ref 24.5–35.6)
APTT BLD: 21.5 SEC — LOW (ref 24.5–35.6)
APTT BLD: 21.5 SEC — LOW (ref 24.5–35.6)
APTT BLD: 24.8 SEC — SIGNIFICANT CHANGE UP (ref 24.5–35.6)
APTT BLD: 25.6 SEC — SIGNIFICANT CHANGE UP (ref 24.5–35.6)
APTT BLD: 26 SEC — SIGNIFICANT CHANGE UP (ref 24.5–35.6)
APTT BLD: 26.1 SEC — SIGNIFICANT CHANGE UP (ref 24.5–35.6)
APTT BLD: 27.3 SEC — SIGNIFICANT CHANGE UP (ref 24.5–35.6)
APTT BLD: 27.3 SEC — SIGNIFICANT CHANGE UP (ref 24.5–35.6)
APTT BLD: 27.7 SEC — SIGNIFICANT CHANGE UP (ref 24.5–35.6)
APTT BLD: 28.3 SEC — SIGNIFICANT CHANGE UP (ref 24.5–35.6)
APTT BLD: 29.3 SEC — SIGNIFICANT CHANGE UP (ref 24.5–36.6)
APTT BLD: 30.9 SEC — SIGNIFICANT CHANGE UP (ref 24.5–35.6)
APTT BLD: 30.9 SEC — SIGNIFICANT CHANGE UP (ref 24.5–35.6)
APTT BLD: 31 SEC — SIGNIFICANT CHANGE UP (ref 24.5–35.6)
APTT BLD: 31.6 SEC — SIGNIFICANT CHANGE UP (ref 24.5–35.6)
APTT BLD: 32.1 SEC — SIGNIFICANT CHANGE UP (ref 24.5–35.6)
APTT BLD: 35.4 SEC — SIGNIFICANT CHANGE UP (ref 24.5–35.6)
APTT BLD: 36.2 SEC — HIGH (ref 24.5–35.6)
APTT BLD: 36.5 SEC — HIGH (ref 24.5–35.6)
APTT BLD: 44.8 SEC — HIGH (ref 24.5–35.6)
APTT BLD: 46.6 SEC — HIGH (ref 24.5–35.6)
APTT BLD: 49.2 SEC — HIGH (ref 24.5–35.6)
APTT BLD: 50.5 SEC — HIGH (ref 24.5–35.6)
AST SERPL-CCNC: 10 U/L — SIGNIFICANT CHANGE UP (ref 10–40)
AST SERPL-CCNC: 104 U/L — HIGH (ref 10–40)
AST SERPL-CCNC: 107 U/L — HIGH (ref 10–40)
AST SERPL-CCNC: 11 U/L — SIGNIFICANT CHANGE UP (ref 10–40)
AST SERPL-CCNC: 116 U/L — HIGH (ref 10–40)
AST SERPL-CCNC: 118 U/L — HIGH (ref 10–40)
AST SERPL-CCNC: 12 U/L — SIGNIFICANT CHANGE UP (ref 10–40)
AST SERPL-CCNC: 12 U/L — SIGNIFICANT CHANGE UP (ref 10–40)
AST SERPL-CCNC: 123 U/L — HIGH (ref 10–40)
AST SERPL-CCNC: 13 U/L — LOW (ref 15–37)
AST SERPL-CCNC: 13 U/L — LOW (ref 15–37)
AST SERPL-CCNC: 13 U/L — SIGNIFICANT CHANGE UP (ref 10–40)
AST SERPL-CCNC: 14 U/L — SIGNIFICANT CHANGE UP (ref 10–40)
AST SERPL-CCNC: 15 U/L — SIGNIFICANT CHANGE UP (ref 10–40)
AST SERPL-CCNC: 15 U/L — SIGNIFICANT CHANGE UP (ref 15–37)
AST SERPL-CCNC: 16 U/L — SIGNIFICANT CHANGE UP (ref 10–40)
AST SERPL-CCNC: 17 U/L
AST SERPL-CCNC: 17 U/L — SIGNIFICANT CHANGE UP (ref 10–40)
AST SERPL-CCNC: 17 U/L — SIGNIFICANT CHANGE UP (ref 15–37)
AST SERPL-CCNC: 18 U/L — SIGNIFICANT CHANGE UP (ref 10–40)
AST SERPL-CCNC: 19 U/L — SIGNIFICANT CHANGE UP (ref 10–40)
AST SERPL-CCNC: 19 U/L — SIGNIFICANT CHANGE UP (ref 15–37)
AST SERPL-CCNC: 19 U/L — SIGNIFICANT CHANGE UP (ref 15–37)
AST SERPL-CCNC: 20 U/L — SIGNIFICANT CHANGE UP (ref 10–40)
AST SERPL-CCNC: 21 U/L — SIGNIFICANT CHANGE UP (ref 10–40)
AST SERPL-CCNC: 21 U/L — SIGNIFICANT CHANGE UP (ref 10–40)
AST SERPL-CCNC: 22 U/L — SIGNIFICANT CHANGE UP (ref 10–40)
AST SERPL-CCNC: 23 U/L — SIGNIFICANT CHANGE UP (ref 10–40)
AST SERPL-CCNC: 24 U/L — SIGNIFICANT CHANGE UP (ref 10–40)
AST SERPL-CCNC: 26 U/L — SIGNIFICANT CHANGE UP (ref 15–37)
AST SERPL-CCNC: 26 U/L — SIGNIFICANT CHANGE UP (ref 15–37)
AST SERPL-CCNC: 28 U/L — SIGNIFICANT CHANGE UP (ref 10–40)
AST SERPL-CCNC: 30 U/L — SIGNIFICANT CHANGE UP (ref 10–40)
AST SERPL-CCNC: 33 U/L — SIGNIFICANT CHANGE UP (ref 10–40)
AST SERPL-CCNC: 49 U/L — HIGH (ref 10–40)
AST SERPL-CCNC: 77 U/L — HIGH (ref 10–40)
AST SERPL-CCNC: 85 U/L — HIGH (ref 10–40)
AST SERPL-CCNC: 9 U/L — LOW (ref 10–40)
AST SERPL-CCNC: 91 U/L — HIGH (ref 10–40)
B-OH-BUTYR SERPL-SCNC: 0.4 MMOL/L — SIGNIFICANT CHANGE UP
B-OH-BUTYR SERPL-SCNC: 2.9 MMOL/L — HIGH
B-OH-BUTYR SERPL-SCNC: 5.6 MMOL/L — HIGH
BACTERIA # UR AUTO: ABNORMAL /HPF
BASE EXCESS BLDA CALC-SCNC: -0.5 MMOL/L — SIGNIFICANT CHANGE UP (ref -2–3)
BASE EXCESS BLDA CALC-SCNC: -11.7 MMOL/L — LOW (ref -2–3)
BASE EXCESS BLDA CALC-SCNC: -15.2 MMOL/L — LOW (ref -2–3)
BASE EXCESS BLDA CALC-SCNC: -17.5 MMOL/L — LOW (ref -2–3)
BASE EXCESS BLDA CALC-SCNC: -2.6 MMOL/L — LOW (ref -2–3)
BASE EXCESS BLDA CALC-SCNC: 0.3 MMOL/L — SIGNIFICANT CHANGE UP (ref -2–3)
BASE EXCESS BLDA CALC-SCNC: 1 MMOL/L — SIGNIFICANT CHANGE UP (ref -2–3)
BASE EXCESS BLDA CALC-SCNC: 2.5 MMOL/L — SIGNIFICANT CHANGE UP (ref -2–3)
BASE EXCESS BLDMV CALC-SCNC: 0.4 MMOL/L — SIGNIFICANT CHANGE UP
BASE EXCESS BLDV CALC-SCNC: -0.4 MMOL/L — SIGNIFICANT CHANGE UP (ref -2–3)
BASE EXCESS BLDV CALC-SCNC: 10.1 MMOL/L — HIGH (ref -2–3)
BASE EXCESS BLDV CALC-SCNC: 12.5 MMOL/L — HIGH (ref -2–3)
BASE EXCESS BLDV CALC-SCNC: 5 MMOL/L — HIGH (ref -2–3)
BASE EXCESS BLDV CALC-SCNC: 6.4 MMOL/L — HIGH (ref -2–3)
BASE EXCESS BLDV CALC-SCNC: 7.3 MMOL/L — HIGH (ref -2–3)
BASOPHILS # BLD AUTO: 0 K/UL — SIGNIFICANT CHANGE UP (ref 0–0.2)
BASOPHILS # BLD AUTO: 0.01 K/UL — SIGNIFICANT CHANGE UP (ref 0–0.2)
BASOPHILS # BLD AUTO: 0.03 K/UL — SIGNIFICANT CHANGE UP (ref 0–0.2)
BASOPHILS # BLD AUTO: 0.03 K/UL — SIGNIFICANT CHANGE UP (ref 0–0.2)
BASOPHILS # BLD AUTO: 0.04 K/UL — SIGNIFICANT CHANGE UP (ref 0–0.2)
BASOPHILS # BLD AUTO: 0.05 K/UL — SIGNIFICANT CHANGE UP (ref 0–0.2)
BASOPHILS # BLD AUTO: 0.06 K/UL — SIGNIFICANT CHANGE UP (ref 0–0.2)
BASOPHILS # BLD AUTO: 0.06 K/UL — SIGNIFICANT CHANGE UP (ref 0–0.2)
BASOPHILS # BLD AUTO: 0.07 K/UL — SIGNIFICANT CHANGE UP (ref 0–0.2)
BASOPHILS # BLD AUTO: 0.08 K/UL — SIGNIFICANT CHANGE UP (ref 0–0.2)
BASOPHILS # BLD AUTO: 0.09 K/UL — SIGNIFICANT CHANGE UP (ref 0–0.2)
BASOPHILS # BLD AUTO: 0.1 K/UL — SIGNIFICANT CHANGE UP (ref 0–0.2)
BASOPHILS # BLD AUTO: 0.11 K/UL — SIGNIFICANT CHANGE UP (ref 0–0.2)
BASOPHILS # BLD AUTO: 0.12 K/UL — SIGNIFICANT CHANGE UP (ref 0–0.2)
BASOPHILS # BLD AUTO: 0.13 K/UL
BASOPHILS # BLD AUTO: 0.13 K/UL — SIGNIFICANT CHANGE UP (ref 0–0.2)
BASOPHILS # BLD AUTO: 0.14 K/UL — SIGNIFICANT CHANGE UP (ref 0–0.2)
BASOPHILS # BLD AUTO: 0.15 K/UL — SIGNIFICANT CHANGE UP (ref 0–0.2)
BASOPHILS # BLD AUTO: 0.16 K/UL — SIGNIFICANT CHANGE UP (ref 0–0.2)
BASOPHILS # BLD AUTO: 0.17 K/UL — SIGNIFICANT CHANGE UP (ref 0–0.2)
BASOPHILS # BLD AUTO: 0.17 K/UL — SIGNIFICANT CHANGE UP (ref 0–0.2)
BASOPHILS # BLD AUTO: 0.18 K/UL — SIGNIFICANT CHANGE UP (ref 0–0.2)
BASOPHILS # BLD AUTO: 0.18 K/UL — SIGNIFICANT CHANGE UP (ref 0–0.2)
BASOPHILS # BLD AUTO: 0.19 K/UL — SIGNIFICANT CHANGE UP (ref 0–0.2)
BASOPHILS # BLD AUTO: 0.19 K/UL — SIGNIFICANT CHANGE UP (ref 0–0.2)
BASOPHILS # BLD AUTO: 0.23 K/UL — HIGH (ref 0–0.2)
BASOPHILS # BLD AUTO: 0.24 K/UL — HIGH (ref 0–0.2)
BASOPHILS # BLD AUTO: 0.34 K/UL — HIGH (ref 0–0.2)
BASOPHILS NFR BLD AUTO: 0 % — SIGNIFICANT CHANGE UP (ref 0–2)
BASOPHILS NFR BLD AUTO: 0.1 % — SIGNIFICANT CHANGE UP (ref 0–2)
BASOPHILS NFR BLD AUTO: 0.3 % — SIGNIFICANT CHANGE UP (ref 0–2)
BASOPHILS NFR BLD AUTO: 0.4 % — SIGNIFICANT CHANGE UP (ref 0–2)
BASOPHILS NFR BLD AUTO: 0.5 % — SIGNIFICANT CHANGE UP (ref 0–2)
BASOPHILS NFR BLD AUTO: 0.6 % — SIGNIFICANT CHANGE UP (ref 0–2)
BASOPHILS NFR BLD AUTO: 0.7 % — SIGNIFICANT CHANGE UP (ref 0–2)
BASOPHILS NFR BLD AUTO: 0.8 % — SIGNIFICANT CHANGE UP (ref 0–2)
BASOPHILS NFR BLD AUTO: 1 % — SIGNIFICANT CHANGE UP (ref 0–2)
BASOPHILS NFR BLD AUTO: 1.1 % — SIGNIFICANT CHANGE UP (ref 0–2)
BASOPHILS NFR BLD AUTO: 1.2 % — SIGNIFICANT CHANGE UP (ref 0–2)
BASOPHILS NFR BLD AUTO: 1.3 % — SIGNIFICANT CHANGE UP (ref 0–2)
BASOPHILS NFR BLD AUTO: 1.4 % — SIGNIFICANT CHANGE UP (ref 0–2)
BASOPHILS NFR BLD AUTO: 1.4 % — SIGNIFICANT CHANGE UP (ref 0–2)
BASOPHILS NFR BLD AUTO: 1.5 % — SIGNIFICANT CHANGE UP (ref 0–2)
BASOPHILS NFR BLD AUTO: 1.5 % — SIGNIFICANT CHANGE UP (ref 0–2)
BASOPHILS NFR BLD AUTO: 1.6 % — SIGNIFICANT CHANGE UP (ref 0–2)
BASOPHILS NFR BLD AUTO: 1.7 % — SIGNIFICANT CHANGE UP (ref 0–2)
BASOPHILS NFR BLD AUTO: 1.8 % — SIGNIFICANT CHANGE UP (ref 0–2)
BASOPHILS NFR BLD AUTO: 2 % — SIGNIFICANT CHANGE UP (ref 0–2)
BASOPHILS NFR BLD AUTO: 2.1 % — HIGH (ref 0–2)
BASOPHILS NFR BLD AUTO: 2.2 % — HIGH (ref 0–2)
BASOPHILS NFR BLD AUTO: 2.5 %
BASOPHILS NFR BLD AUTO: 2.6 % — HIGH (ref 0–2)
BASOPHILS NFR BLD AUTO: 2.8 % — HIGH (ref 0–2)
BILIRUB DIRECT SERPL-MCNC: 2.8 MG/DL — HIGH (ref 0–0.3)
BILIRUB INDIRECT FLD-MCNC: 1.6 MG/DL — HIGH (ref 0.2–1)
BILIRUB SERPL-MCNC: 0.3 MG/DL — SIGNIFICANT CHANGE UP (ref 0.2–1.2)
BILIRUB SERPL-MCNC: 0.4 MG/DL — SIGNIFICANT CHANGE UP (ref 0.2–1.2)
BILIRUB SERPL-MCNC: 0.5 MG/DL — SIGNIFICANT CHANGE UP (ref 0.2–1.2)
BILIRUB SERPL-MCNC: 0.6 MG/DL
BILIRUB SERPL-MCNC: 0.6 MG/DL — SIGNIFICANT CHANGE UP (ref 0.2–1.2)
BILIRUB SERPL-MCNC: 0.7 MG/DL — SIGNIFICANT CHANGE UP (ref 0.2–1.2)
BILIRUB SERPL-MCNC: 0.8 MG/DL — SIGNIFICANT CHANGE UP (ref 0.2–1.2)
BILIRUB SERPL-MCNC: 0.9 MG/DL — SIGNIFICANT CHANGE UP (ref 0.2–1.2)
BILIRUB SERPL-MCNC: 1 MG/DL — SIGNIFICANT CHANGE UP (ref 0.2–1.2)
BILIRUB SERPL-MCNC: 1.1 MG/DL — SIGNIFICANT CHANGE UP (ref 0.2–1.2)
BILIRUB SERPL-MCNC: 1.2 MG/DL — SIGNIFICANT CHANGE UP (ref 0.2–1.2)
BILIRUB SERPL-MCNC: 1.4 MG/DL — HIGH (ref 0.2–1.2)
BILIRUB SERPL-MCNC: 1.5 MG/DL — HIGH (ref 0.2–1.2)
BILIRUB SERPL-MCNC: 1.5 MG/DL — HIGH (ref 0.2–1.2)
BILIRUB SERPL-MCNC: 1.6 MG/DL — HIGH (ref 0.2–1.2)
BILIRUB SERPL-MCNC: 2.1 MG/DL — HIGH (ref 0.2–1.2)
BILIRUB SERPL-MCNC: 2.2 MG/DL — HIGH (ref 0.2–1.2)
BILIRUB SERPL-MCNC: 3.4 MG/DL — HIGH (ref 0.2–1.2)
BILIRUB SERPL-MCNC: 4.4 MG/DL — HIGH (ref 0.2–1.2)
BILIRUB UR-MCNC: NEGATIVE — SIGNIFICANT CHANGE UP
BLD GP AB SCN SERPL QL: NEGATIVE — SIGNIFICANT CHANGE UP
BLD GP AB SCN SERPL QL: SIGNIFICANT CHANGE UP
BLD GP AB SCN SERPL QL: SIGNIFICANT CHANGE UP
BLOOD GAS COMMENTS ARTERIAL: SIGNIFICANT CHANGE UP
BLOOD GAS COMMENTS, VENOUS: SIGNIFICANT CHANGE UP
BODY SURFACE AREA CALCULATION: 1.73 M2 — SIGNIFICANT CHANGE UP
BUN SERPL-MCNC: 11 MG/DL — SIGNIFICANT CHANGE UP (ref 7–23)
BUN SERPL-MCNC: 12 MG/DL — SIGNIFICANT CHANGE UP (ref 7–23)
BUN SERPL-MCNC: 13 MG/DL — SIGNIFICANT CHANGE UP (ref 7–23)
BUN SERPL-MCNC: 14 MG/DL — SIGNIFICANT CHANGE UP (ref 7–23)
BUN SERPL-MCNC: 15 MG/DL — SIGNIFICANT CHANGE UP (ref 7–23)
BUN SERPL-MCNC: 16 MG/DL — SIGNIFICANT CHANGE UP (ref 7–23)
BUN SERPL-MCNC: 16 MG/DL — SIGNIFICANT CHANGE UP (ref 7–23)
BUN SERPL-MCNC: 17 MG/DL — SIGNIFICANT CHANGE UP (ref 7–23)
BUN SERPL-MCNC: 18 MG/DL — SIGNIFICANT CHANGE UP (ref 7–23)
BUN SERPL-MCNC: 18 MG/DL — SIGNIFICANT CHANGE UP (ref 7–23)
BUN SERPL-MCNC: 20 MG/DL — SIGNIFICANT CHANGE UP (ref 7–23)
BUN SERPL-MCNC: 21 MG/DL — SIGNIFICANT CHANGE UP (ref 7–23)
BUN SERPL-MCNC: 22 MG/DL — SIGNIFICANT CHANGE UP (ref 7–23)
BUN SERPL-MCNC: 22 MG/DL — SIGNIFICANT CHANGE UP (ref 7–23)
BUN SERPL-MCNC: 24 MG/DL — HIGH (ref 7–23)
BUN SERPL-MCNC: 25 MG/DL — HIGH (ref 7–23)
BUN SERPL-MCNC: 26 MG/DL — HIGH (ref 7–23)
BUN SERPL-MCNC: 27 MG/DL — HIGH (ref 7–23)
BUN SERPL-MCNC: 29 MG/DL — HIGH (ref 7–23)
BUN SERPL-MCNC: 29 MG/DL — HIGH (ref 7–23)
BUN SERPL-MCNC: 30 MG/DL — HIGH (ref 7–23)
BUN SERPL-MCNC: 31 MG/DL — HIGH (ref 7–23)
BUN SERPL-MCNC: 31 MG/DL — HIGH (ref 7–23)
BUN SERPL-MCNC: 32 MG/DL — HIGH (ref 7–23)
BUN SERPL-MCNC: 32 MG/DL — HIGH (ref 7–23)
BUN SERPL-MCNC: 33 MG/DL — HIGH (ref 7–23)
BUN SERPL-MCNC: 34 MG/DL — HIGH (ref 7–23)
BUN SERPL-MCNC: 35 MG/DL — HIGH (ref 7–23)
BUN SERPL-MCNC: 36 MG/DL — HIGH (ref 7–23)
BUN SERPL-MCNC: 36 MG/DL — HIGH (ref 7–23)
BUN SERPL-MCNC: 37 MG/DL — HIGH (ref 7–23)
BUN SERPL-MCNC: 38 MG/DL — HIGH (ref 7–23)
BUN SERPL-MCNC: 39 MG/DL
BUN SERPL-MCNC: 39 MG/DL — HIGH (ref 7–23)
BUN SERPL-MCNC: 40 MG/DL — HIGH (ref 7–23)
BUN SERPL-MCNC: 42 MG/DL — HIGH (ref 7–23)
BUN SERPL-MCNC: 42 MG/DL — HIGH (ref 7–23)
BUN SERPL-MCNC: 43 MG/DL — HIGH (ref 7–23)
BUN SERPL-MCNC: 44 MG/DL — HIGH (ref 7–23)
BUN SERPL-MCNC: 45 MG/DL — HIGH (ref 7–23)
BUN SERPL-MCNC: 46 MG/DL — HIGH (ref 7–23)
BUN SERPL-MCNC: 47 MG/DL — HIGH (ref 7–23)
BUN SERPL-MCNC: 48 MG/DL — HIGH (ref 7–23)
BUN SERPL-MCNC: 49 MG/DL — HIGH (ref 7–23)
BUN SERPL-MCNC: 50 MG/DL — HIGH (ref 7–23)
BUN SERPL-MCNC: 50 MG/DL — HIGH (ref 7–23)
BUN SERPL-MCNC: 51 MG/DL — HIGH (ref 7–23)
BUN SERPL-MCNC: 52 MG/DL — HIGH (ref 7–23)
BUN SERPL-MCNC: 53 MG/DL — HIGH (ref 7–23)
BUN SERPL-MCNC: 53 MG/DL — HIGH (ref 7–23)
BUN SERPL-MCNC: 54 MG/DL — HIGH (ref 7–23)
BUN SERPL-MCNC: 55 MG/DL — HIGH (ref 7–23)
BUN SERPL-MCNC: 56 MG/DL — HIGH (ref 7–23)
BUN SERPL-MCNC: 57 MG/DL — HIGH (ref 7–23)
BUN SERPL-MCNC: 59 MG/DL — HIGH (ref 7–23)
BUN SERPL-MCNC: 59 MG/DL — HIGH (ref 7–23)
BUN SERPL-MCNC: 61 MG/DL — HIGH (ref 7–23)
BUN SERPL-MCNC: 63 MG/DL — HIGH (ref 7–23)
BUN SERPL-MCNC: 64 MG/DL — HIGH (ref 7–23)
BUN SERPL-MCNC: 65 MG/DL — HIGH (ref 7–23)
BUN SERPL-MCNC: 66 MG/DL — HIGH (ref 7–23)
BUN SERPL-MCNC: 67 MG/DL — HIGH (ref 7–23)
BUN SERPL-MCNC: 68 MG/DL — HIGH (ref 7–23)
BUN SERPL-MCNC: 68 MG/DL — HIGH (ref 7–23)
BUN SERPL-MCNC: 69 MG/DL — HIGH (ref 7–23)
BUN SERPL-MCNC: 69 MG/DL — HIGH (ref 7–23)
BUN SERPL-MCNC: 72 MG/DL — HIGH (ref 7–23)
BUN SERPL-MCNC: 75 MG/DL — HIGH (ref 7–23)
BUN SERPL-MCNC: 76 MG/DL — HIGH (ref 7–23)
BUN SERPL-MCNC: 76 MG/DL — HIGH (ref 7–23)
BUN SERPL-MCNC: 77 MG/DL — HIGH (ref 7–23)
BUN SERPL-MCNC: 79 MG/DL — HIGH (ref 7–23)
BUN SERPL-MCNC: 80 MG/DL — HIGH (ref 7–23)
BUN SERPL-MCNC: 88 MG/DL — HIGH (ref 7–23)
BUN SERPL-MCNC: 94 MG/DL — HIGH (ref 7–23)
BURR CELLS BLD QL SMEAR: PRESENT — SIGNIFICANT CHANGE UP
C PEPTIDE SERPL-MCNC: <0.1 NG/ML — LOW (ref 1.1–4.4)
CA-I SERPL-SCNC: 1.04 MMOL/L — LOW (ref 1.15–1.33)
CA-I SERPL-SCNC: 1.05 MMOL/L — LOW (ref 1.15–1.33)
CA-I SERPL-SCNC: 1.12 MMOL/L — LOW (ref 1.15–1.33)
CA-I SERPL-SCNC: 1.16 MMOL/L — SIGNIFICANT CHANGE UP (ref 1.15–1.33)
CALCIUM SERPL-MCNC: 7 MG/DL — LOW (ref 8.4–10.5)
CALCIUM SERPL-MCNC: 7.1 MG/DL — LOW (ref 8.4–10.5)
CALCIUM SERPL-MCNC: 7.2 MG/DL — LOW (ref 8.4–10.5)
CALCIUM SERPL-MCNC: 7.2 MG/DL — LOW (ref 8.4–10.5)
CALCIUM SERPL-MCNC: 7.3 MG/DL — LOW (ref 8.4–10.5)
CALCIUM SERPL-MCNC: 7.3 MG/DL — LOW (ref 8.4–10.5)
CALCIUM SERPL-MCNC: 7.4 MG/DL — LOW (ref 8.4–10.5)
CALCIUM SERPL-MCNC: 7.4 MG/DL — LOW (ref 8.4–10.5)
CALCIUM SERPL-MCNC: 7.5 MG/DL — LOW (ref 8.4–10.5)
CALCIUM SERPL-MCNC: 7.5 MG/DL — LOW (ref 8.4–10.5)
CALCIUM SERPL-MCNC: 7.6 MG/DL — LOW (ref 8.4–10.5)
CALCIUM SERPL-MCNC: 7.7 MG/DL — LOW (ref 8.4–10.5)
CALCIUM SERPL-MCNC: 7.7 MG/DL — LOW (ref 8.4–10.5)
CALCIUM SERPL-MCNC: 7.8 MG/DL — LOW (ref 8.4–10.5)
CALCIUM SERPL-MCNC: 7.8 MG/DL — LOW (ref 8.4–10.5)
CALCIUM SERPL-MCNC: 7.9 MG/DL — LOW (ref 8.4–10.5)
CALCIUM SERPL-MCNC: 8 MG/DL — LOW (ref 8.4–10.5)
CALCIUM SERPL-MCNC: 8.1 MG/DL — LOW (ref 8.4–10.5)
CALCIUM SERPL-MCNC: 8.1 MG/DL — LOW (ref 8.5–10.1)
CALCIUM SERPL-MCNC: 8.2 MG/DL — LOW (ref 8.4–10.5)
CALCIUM SERPL-MCNC: 8.2 MG/DL — LOW (ref 8.5–10.1)
CALCIUM SERPL-MCNC: 8.3 MG/DL — LOW (ref 8.4–10.5)
CALCIUM SERPL-MCNC: 8.3 MG/DL — LOW (ref 8.5–10.1)
CALCIUM SERPL-MCNC: 8.3 MG/DL — LOW (ref 8.5–10.1)
CALCIUM SERPL-MCNC: 8.4 MG/DL — LOW (ref 8.5–10.1)
CALCIUM SERPL-MCNC: 8.4 MG/DL — LOW (ref 8.5–10.1)
CALCIUM SERPL-MCNC: 8.4 MG/DL — SIGNIFICANT CHANGE UP (ref 8.4–10.5)
CALCIUM SERPL-MCNC: 8.5 MG/DL — SIGNIFICANT CHANGE UP (ref 8.4–10.5)
CALCIUM SERPL-MCNC: 8.5 MG/DL — SIGNIFICANT CHANGE UP (ref 8.5–10.1)
CALCIUM SERPL-MCNC: 8.5 MG/DL — SIGNIFICANT CHANGE UP (ref 8.5–10.1)
CALCIUM SERPL-MCNC: 8.6 MG/DL — SIGNIFICANT CHANGE UP (ref 8.4–10.5)
CALCIUM SERPL-MCNC: 8.6 MG/DL — SIGNIFICANT CHANGE UP (ref 8.5–10.1)
CALCIUM SERPL-MCNC: 8.7 MG/DL — SIGNIFICANT CHANGE UP (ref 8.4–10.5)
CALCIUM SERPL-MCNC: 8.8 MG/DL
CALCIUM SERPL-MCNC: 8.8 MG/DL — SIGNIFICANT CHANGE UP (ref 8.4–10.5)
CALCIUM SERPL-MCNC: 8.9 MG/DL — SIGNIFICANT CHANGE UP (ref 8.4–10.5)
CALCIUM SERPL-MCNC: 8.9 MG/DL — SIGNIFICANT CHANGE UP (ref 8.5–10.1)
CALCIUM SERPL-MCNC: 9 MG/DL — SIGNIFICANT CHANGE UP (ref 8.4–10.5)
CALCIUM SERPL-MCNC: 9 MG/DL — SIGNIFICANT CHANGE UP (ref 8.5–10.1)
CALCIUM SERPL-MCNC: 9 MG/DL — SIGNIFICANT CHANGE UP (ref 8.5–10.1)
CALCIUM SERPL-MCNC: 9.1 MG/DL — SIGNIFICANT CHANGE UP (ref 8.4–10.5)
CALCIUM SERPL-MCNC: 9.1 MG/DL — SIGNIFICANT CHANGE UP (ref 8.5–10.1)
CALCIUM SERPL-MCNC: 9.1 MG/DL — SIGNIFICANT CHANGE UP (ref 8.5–10.1)
CALCIUM SERPL-MCNC: 9.2 MG/DL — SIGNIFICANT CHANGE UP (ref 8.4–10.5)
CAST: 2 /LPF — SIGNIFICANT CHANGE UP (ref 0–4)
CAST: 23 /LPF — HIGH (ref 0–4)
CAST: 4 /LPF — SIGNIFICANT CHANGE UP (ref 0–4)
CHLORIDE BLDV-SCNC: 91 MMOL/L — LOW (ref 96–108)
CHLORIDE BLDV-SCNC: 93 MMOL/L — LOW (ref 96–108)
CHLORIDE BLDV-SCNC: 95 MMOL/L — LOW (ref 96–108)
CHLORIDE BLDV-SCNC: 96 MMOL/L — SIGNIFICANT CHANGE UP (ref 96–108)
CHLORIDE SERPL-SCNC: 100 MMOL/L — SIGNIFICANT CHANGE UP (ref 96–108)
CHLORIDE SERPL-SCNC: 101 MMOL/L — SIGNIFICANT CHANGE UP (ref 96–108)
CHLORIDE SERPL-SCNC: 102 MMOL/L — SIGNIFICANT CHANGE UP (ref 96–108)
CHLORIDE SERPL-SCNC: 103 MMOL/L — SIGNIFICANT CHANGE UP (ref 96–108)
CHLORIDE SERPL-SCNC: 104 MMOL/L — SIGNIFICANT CHANGE UP (ref 96–108)
CHLORIDE SERPL-SCNC: 105 MMOL/L — SIGNIFICANT CHANGE UP (ref 96–108)
CHLORIDE SERPL-SCNC: 106 MMOL/L — SIGNIFICANT CHANGE UP (ref 96–108)
CHLORIDE SERPL-SCNC: 107 MMOL/L — SIGNIFICANT CHANGE UP (ref 96–108)
CHLORIDE SERPL-SCNC: 88 MMOL/L — LOW (ref 96–108)
CHLORIDE SERPL-SCNC: 89 MMOL/L — LOW (ref 96–108)
CHLORIDE SERPL-SCNC: 89 MMOL/L — LOW (ref 96–108)
CHLORIDE SERPL-SCNC: 90 MMOL/L — LOW (ref 96–108)
CHLORIDE SERPL-SCNC: 91 MMOL/L — LOW (ref 96–108)
CHLORIDE SERPL-SCNC: 92 MMOL/L — LOW (ref 96–108)
CHLORIDE SERPL-SCNC: 93 MMOL/L — LOW (ref 96–108)
CHLORIDE SERPL-SCNC: 94 MMOL/L
CHLORIDE SERPL-SCNC: 94 MMOL/L — LOW (ref 96–108)
CHLORIDE SERPL-SCNC: 95 MMOL/L — LOW (ref 96–108)
CHLORIDE SERPL-SCNC: 96 MMOL/L — SIGNIFICANT CHANGE UP (ref 96–108)
CHLORIDE SERPL-SCNC: 97 MMOL/L — SIGNIFICANT CHANGE UP (ref 96–108)
CHLORIDE SERPL-SCNC: 97 MMOL/L — SIGNIFICANT CHANGE UP (ref 96–108)
CHLORIDE SERPL-SCNC: 98 MMOL/L — SIGNIFICANT CHANGE UP (ref 96–108)
CHLORIDE SERPL-SCNC: 99 MMOL/L — SIGNIFICANT CHANGE UP (ref 96–108)
CHOLEST SERPL-MCNC: 74 MG/DL — SIGNIFICANT CHANGE UP
CO2 BLDA-SCNC: 14 MMOL/L — LOW (ref 19–24)
CO2 BLDA-SCNC: 15 MMOL/L — LOW (ref 19–24)
CO2 BLDA-SCNC: 16 MMOL/L — LOW (ref 19–24)
CO2 BLDA-SCNC: 24 MMOL/L — SIGNIFICANT CHANGE UP (ref 19–24)
CO2 BLDA-SCNC: 25 MMOL/L — HIGH (ref 19–24)
CO2 BLDA-SCNC: 25 MMOL/L — HIGH (ref 19–24)
CO2 BLDA-SCNC: 26 MMOL/L — HIGH (ref 19–24)
CO2 BLDA-SCNC: 26 MMOL/L — HIGH (ref 19–24)
CO2 BLDMV-SCNC: 27 MMOL/L — SIGNIFICANT CHANGE UP
CO2 BLDV-SCNC: 27 MMOL/L — HIGH (ref 22–26)
CO2 BLDV-SCNC: 34 MMOL/L — HIGH (ref 22–26)
CO2 BLDV-SCNC: 37 MMOL/L — HIGH (ref 22–26)
CO2 BLDV-SCNC: 38 MMOL/L — HIGH (ref 22–26)
CO2 BLDV-SCNC: 40 MMOL/L — HIGH (ref 22–26)
CO2 SERPL-SCNC: 15 MMOL/L — LOW (ref 22–31)
CO2 SERPL-SCNC: 15 MMOL/L — LOW (ref 22–31)
CO2 SERPL-SCNC: 16 MMOL/L — LOW (ref 22–31)
CO2 SERPL-SCNC: 17 MMOL/L — LOW (ref 22–31)
CO2 SERPL-SCNC: 17 MMOL/L — LOW (ref 22–31)
CO2 SERPL-SCNC: 18 MMOL/L — LOW (ref 22–31)
CO2 SERPL-SCNC: 19 MMOL/L — LOW (ref 22–31)
CO2 SERPL-SCNC: 19 MMOL/L — LOW (ref 22–31)
CO2 SERPL-SCNC: 20 MMOL/L — LOW (ref 22–31)
CO2 SERPL-SCNC: 21 MMOL/L — LOW (ref 22–31)
CO2 SERPL-SCNC: 22 MMOL/L — SIGNIFICANT CHANGE UP (ref 22–31)
CO2 SERPL-SCNC: 23 MMOL/L — SIGNIFICANT CHANGE UP (ref 22–31)
CO2 SERPL-SCNC: 24 MMOL/L — SIGNIFICANT CHANGE UP (ref 22–31)
CO2 SERPL-SCNC: 25 MMOL/L — SIGNIFICANT CHANGE UP (ref 22–31)
CO2 SERPL-SCNC: 26 MMOL/L — SIGNIFICANT CHANGE UP (ref 22–31)
CO2 SERPL-SCNC: 27 MMOL/L — SIGNIFICANT CHANGE UP (ref 22–31)
CO2 SERPL-SCNC: 28 MMOL/L — SIGNIFICANT CHANGE UP (ref 22–31)
CO2 SERPL-SCNC: 29 MMOL/L
CO2 SERPL-SCNC: 29 MMOL/L — SIGNIFICANT CHANGE UP (ref 22–31)
CO2 SERPL-SCNC: 30 MMOL/L — SIGNIFICANT CHANGE UP (ref 22–31)
CO2 SERPL-SCNC: 30 MMOL/L — SIGNIFICANT CHANGE UP (ref 22–31)
CO2 SERPL-SCNC: 34 MMOL/L — HIGH (ref 22–31)
CO2 SERPL-SCNC: 34 MMOL/L — HIGH (ref 22–31)
CO2 SERPL-SCNC: 36 MMOL/L — HIGH (ref 22–31)
CO2 SERPL-SCNC: 37 MMOL/L — HIGH (ref 22–31)
CO2 SERPL-SCNC: 38 MMOL/L — HIGH (ref 22–31)
COHGB MFR BLDMV: 2 % — HIGH (ref 0–2)
COLLECT DURATION TIME UR: 24 HR — SIGNIFICANT CHANGE UP
COLLECT DURATION TIME UR: 24 HR — SIGNIFICANT CHANGE UP
COLOR SPEC: SIGNIFICANT CHANGE UP
COLOR SPEC: YELLOW — SIGNIFICANT CHANGE UP
CORTIS 24H UR-MRATE: 1.2 MCG/24 H — LOW (ref 3.5–45)
CORTIS AM PEAK SERPL-MCNC: 4.1 UG/DL — LOW (ref 6–18.4)
CORTIS AM PEAK SERPL-MCNC: 9.4 UG/DL — SIGNIFICANT CHANGE UP (ref 6–18.4)
CORTIS UR-MCNC: 200 ML — SIGNIFICANT CHANGE UP
CORTIS UR-MCNC: 24 H — SIGNIFICANT CHANGE UP
CREAT ?TM UR-MCNC: 34 MG/DL — SIGNIFICANT CHANGE UP
CREAT CL ?TM UR+SERPL-VRATE: 26 ML/MIN — LOW (ref 75–115)
CREAT SERPL-MCNC: 1.21 MG/DL — SIGNIFICANT CHANGE UP (ref 0.5–1.3)
CREAT SERPL-MCNC: 1.28 MG/DL — SIGNIFICANT CHANGE UP (ref 0.5–1.3)
CREAT SERPL-MCNC: 1.29 MG/DL — SIGNIFICANT CHANGE UP (ref 0.5–1.3)
CREAT SERPL-MCNC: 1.3 MG/DL — SIGNIFICANT CHANGE UP (ref 0.5–1.3)
CREAT SERPL-MCNC: 1.3 MG/DL — SIGNIFICANT CHANGE UP (ref 0.5–1.3)
CREAT SERPL-MCNC: 1.31 MG/DL — HIGH (ref 0.5–1.3)
CREAT SERPL-MCNC: 1.33 MG/DL — HIGH (ref 0.5–1.3)
CREAT SERPL-MCNC: 1.38 MG/DL — HIGH (ref 0.5–1.3)
CREAT SERPL-MCNC: 1.4 MG/DL — HIGH (ref 0.5–1.3)
CREAT SERPL-MCNC: 1.48 MG/DL — HIGH (ref 0.5–1.3)
CREAT SERPL-MCNC: 1.5 MG/DL — HIGH (ref 0.5–1.3)
CREAT SERPL-MCNC: 1.52 MG/DL — HIGH (ref 0.5–1.3)
CREAT SERPL-MCNC: 1.54 MG/DL — HIGH (ref 0.5–1.3)
CREAT SERPL-MCNC: 1.54 MG/DL — HIGH (ref 0.5–1.3)
CREAT SERPL-MCNC: 1.6 MG/DL — HIGH (ref 0.5–1.3)
CREAT SERPL-MCNC: 1.61 MG/DL — HIGH (ref 0.5–1.3)
CREAT SERPL-MCNC: 1.61 MG/DL — HIGH (ref 0.5–1.3)
CREAT SERPL-MCNC: 1.62 MG/DL — HIGH (ref 0.5–1.3)
CREAT SERPL-MCNC: 1.63 MG/DL — HIGH (ref 0.5–1.3)
CREAT SERPL-MCNC: 1.66 MG/DL — HIGH (ref 0.5–1.3)
CREAT SERPL-MCNC: 1.72 MG/DL — HIGH (ref 0.5–1.3)
CREAT SERPL-MCNC: 1.77 MG/DL — HIGH (ref 0.5–1.3)
CREAT SERPL-MCNC: 1.83 MG/DL — HIGH (ref 0.5–1.3)
CREAT SERPL-MCNC: 1.84 MG/DL — HIGH (ref 0.5–1.3)
CREAT SERPL-MCNC: 1.95 MG/DL — HIGH (ref 0.5–1.3)
CREAT SERPL-MCNC: 2 MG/DL — HIGH (ref 0.5–1.3)
CREAT SERPL-MCNC: 2 MG/DL — HIGH (ref 0.5–1.3)
CREAT SERPL-MCNC: 2.02 MG/DL — HIGH (ref 0.5–1.3)
CREAT SERPL-MCNC: 2.03 MG/DL — HIGH (ref 0.5–1.3)
CREAT SERPL-MCNC: 2.04 MG/DL
CREAT SERPL-MCNC: 2.06 MG/DL — HIGH (ref 0.5–1.3)
CREAT SERPL-MCNC: 2.1 MG/DL — HIGH (ref 0.5–1.3)
CREAT SERPL-MCNC: 2.1 MG/DL — HIGH (ref 0.5–1.3)
CREAT SERPL-MCNC: 2.17 MG/DL — HIGH (ref 0.5–1.3)
CREAT SERPL-MCNC: 2.18 MG/DL — HIGH (ref 0.5–1.3)
CREAT SERPL-MCNC: 2.2 MG/DL — HIGH (ref 0.5–1.3)
CREAT SERPL-MCNC: 2.21 MG/DL — HIGH (ref 0.5–1.3)
CREAT SERPL-MCNC: 2.23 MG/DL — HIGH (ref 0.5–1.3)
CREAT SERPL-MCNC: 2.24 MG/DL — HIGH (ref 0.5–1.3)
CREAT SERPL-MCNC: 2.25 MG/DL — HIGH (ref 0.5–1.3)
CREAT SERPL-MCNC: 2.25 MG/DL — HIGH (ref 0.5–1.3)
CREAT SERPL-MCNC: 2.26 MG/DL — HIGH (ref 0.5–1.3)
CREAT SERPL-MCNC: 2.3 MG/DL — HIGH (ref 0.5–1.3)
CREAT SERPL-MCNC: 2.36 MG/DL — HIGH (ref 0.5–1.3)
CREAT SERPL-MCNC: 2.37 MG/DL — HIGH (ref 0.5–1.3)
CREAT SERPL-MCNC: 2.37 MG/DL — HIGH (ref 0.5–1.3)
CREAT SERPL-MCNC: 2.39 MG/DL — HIGH (ref 0.5–1.3)
CREAT SERPL-MCNC: 2.4 MG/DL — HIGH (ref 0.5–1.3)
CREAT SERPL-MCNC: 2.46 MG/DL — HIGH (ref 0.5–1.3)
CREAT SERPL-MCNC: 2.47 MG/DL — HIGH (ref 0.5–1.3)
CREAT SERPL-MCNC: 2.5 MG/DL — HIGH (ref 0.5–1.3)
CREAT SERPL-MCNC: 2.51 MG/DL — HIGH (ref 0.5–1.3)
CREAT SERPL-MCNC: 2.53 MG/DL — HIGH (ref 0.5–1.3)
CREAT SERPL-MCNC: 2.53 MG/DL — HIGH (ref 0.5–1.3)
CREAT SERPL-MCNC: 2.58 MG/DL — HIGH (ref 0.5–1.3)
CREAT SERPL-MCNC: 2.6 MG/DL — HIGH (ref 0.5–1.3)
CREAT SERPL-MCNC: 2.63 MG/DL — HIGH (ref 0.5–1.3)
CREAT SERPL-MCNC: 2.64 MG/DL — HIGH (ref 0.5–1.3)
CREAT SERPL-MCNC: 2.66 MG/DL — HIGH (ref 0.5–1.3)
CREAT SERPL-MCNC: 2.69 MG/DL — HIGH (ref 0.5–1.3)
CREAT SERPL-MCNC: 2.69 MG/DL — HIGH (ref 0.5–1.3)
CREAT SERPL-MCNC: 2.7 MG/DL — HIGH (ref 0.5–1.3)
CREAT SERPL-MCNC: 2.72 MG/DL — HIGH (ref 0.5–1.3)
CREAT SERPL-MCNC: 2.72 MG/DL — HIGH (ref 0.5–1.3)
CREAT SERPL-MCNC: 2.73 MG/DL — HIGH (ref 0.5–1.3)
CREAT SERPL-MCNC: 2.75 MG/DL — HIGH (ref 0.5–1.3)
CREAT SERPL-MCNC: 2.76 MG/DL — HIGH (ref 0.5–1.3)
CREAT SERPL-MCNC: 2.81 MG/DL — HIGH (ref 0.5–1.3)
CREAT SERPL-MCNC: 2.83 MG/DL — HIGH (ref 0.5–1.3)
CREAT SERPL-MCNC: 2.85 MG/DL — HIGH (ref 0.5–1.3)
CREAT SERPL-MCNC: 2.86 MG/DL — HIGH (ref 0.5–1.3)
CREAT SERPL-MCNC: 2.86 MG/DL — HIGH (ref 0.5–1.3)
CREAT SERPL-MCNC: 2.87 MG/DL — HIGH (ref 0.5–1.3)
CREAT SERPL-MCNC: 2.87 MG/DL — HIGH (ref 0.5–1.3)
CREAT SERPL-MCNC: 2.92 MG/DL — HIGH (ref 0.5–1.3)
CREAT SERPL-MCNC: 2.96 MG/DL — HIGH (ref 0.5–1.3)
CREAT SERPL-MCNC: 3 MG/DL — HIGH (ref 0.5–1.3)
CREAT SERPL-MCNC: 3.02 MG/DL — HIGH (ref 0.5–1.3)
CREAT SERPL-MCNC: 3.08 MG/DL — HIGH (ref 0.5–1.3)
CREAT SERPL-MCNC: 3.1 MG/DL — HIGH (ref 0.5–1.3)
CREAT SERPL-MCNC: 3.1 MG/DL — HIGH (ref 0.5–1.3)
CREAT SERPL-MCNC: 3.25 MG/DL — HIGH (ref 0.5–1.3)
CREAT SERPL-MCNC: 3.37 MG/DL — HIGH (ref 0.5–1.3)
CREAT SERPL-MCNC: 3.47 MG/DL — HIGH (ref 0.5–1.3)
CREAT SERPL-MCNC: 3.48 MG/DL — HIGH (ref 0.5–1.3)
CREAT SERPL-MCNC: 3.49 MG/DL — HIGH (ref 0.5–1.3)
CREAT SERPL-MCNC: 3.5 MG/DL — HIGH (ref 0.5–1.3)
CREAT SERPL-MCNC: 3.57 MG/DL — HIGH (ref 0.5–1.3)
CREAT SERPL-MCNC: 3.62 MG/DL — HIGH (ref 0.5–1.3)
CREAT SERPL-MCNC: 3.66 MG/DL — HIGH (ref 0.5–1.3)
CREAT SERPL-MCNC: 3.89 MG/DL — HIGH (ref 0.5–1.3)
CREAT SERPL-MCNC: 4.02 MG/DL — HIGH (ref 0.5–1.3)
CREAT SERPL-MCNC: 4.09 MG/DL — HIGH (ref 0.5–1.3)
CREAT SERPL-MCNC: 4.14 MG/DL — HIGH (ref 0.5–1.3)
CREAT SERPL-MCNC: 4.25 MG/DL — HIGH (ref 0.5–1.3)
CREAT SERPL-MCNC: 4.35 MG/DL — HIGH (ref 0.5–1.3)
CREAT SERPL-MCNC: 4.55 MG/DL — HIGH (ref 0.5–1.3)
CREAT SERPL-MCNC: 4.75 MG/DL — HIGH (ref 0.5–1.3)
CULTURE RESULTS: ABNORMAL
CULTURE RESULTS: SIGNIFICANT CHANGE UP
CYSTATIN C SERPL-MCNC: 3.45 MG/L — HIGH (ref 0.68–1.36)
CYSTATIN C SERPL-MCNC: 3.53 MG/L — HIGH (ref 0.68–1.36)
D DIMER BLD IA.RAPID-MCNC: 9655 NG/ML DDU — HIGH
DENV1 AB SER-ACNC: 139 UG/DL — SIGNIFICANT CHANGE UP (ref 9–246)
DEXAMETHASONE SERPL-MCNC: 374 NG/DL — SIGNIFICANT CHANGE UP
DEXAMETHASONE SERPL-MCNC: 449 NG/DL — SIGNIFICANT CHANGE UP
DIFF PNL FLD: ABNORMAL
DIFF PNL FLD: NEGATIVE — SIGNIFICANT CHANGE UP
DIFF PNL FLD: NEGATIVE — SIGNIFICANT CHANGE UP
EGFR: 10 ML/MIN/1.73M2 — LOW
EGFR: 10 ML/MIN/1.73M2 — LOW
EGFR: 11 ML/MIN/1.73M2 — LOW
EGFR: 12 ML/MIN/1.73M2 — LOW
EGFR: 13 ML/MIN/1.73M2 — LOW
EGFR: 14 ML/MIN/1.73M2 — LOW
EGFR: 15 ML/MIN/1.73M2 — LOW
EGFR: 16 ML/MIN/1.73M2 — LOW
EGFR: 17 ML/MIN/1.73M2 — LOW
EGFR: 18 ML/MIN/1.73M2 — LOW
EGFR: 19 ML/MIN/1.73M2 — LOW
EGFR: 20 ML/MIN/1.73M2 — LOW
EGFR: 21 ML/MIN/1.73M2 — LOW
EGFR: 22 ML/MIN/1.73M2 — LOW
EGFR: 23 ML/MIN/1.73M2 — LOW
EGFR: 24 ML/MIN/1.73M2 — LOW
EGFR: 25 ML/MIN/1.73M2
EGFR: 25 ML/MIN/1.73M2 — LOW
EGFR: 26 ML/MIN/1.73M2 — LOW
EGFR: 27 ML/MIN/1.73M2 — LOW
EGFR: 29 ML/MIN/1.73M2 — LOW
EGFR: 29 ML/MIN/1.73M2 — LOW
EGFR: 30 ML/MIN/1.73M2 — LOW
EGFR: 31 ML/MIN/1.73M2 — LOW
EGFR: 33 ML/MIN/1.73M2 — LOW
EGFR: 33 ML/MIN/1.73M2 — LOW
EGFR: 34 ML/MIN/1.73M2 — LOW
EGFR: 36 ML/MIN/1.73M2 — LOW
EGFR: 37 ML/MIN/1.73M2 — LOW
EGFR: 37 ML/MIN/1.73M2 — LOW
EGFR: 40 ML/MIN/1.73M2 — LOW
EGFR: 41 ML/MIN/1.73M2 — LOW
EGFR: 43 ML/MIN/1.73M2 — LOW
EGFR: 43 ML/MIN/1.73M2 — LOW
EGFR: 44 ML/MIN/1.73M2 — LOW
EGFR: 45 ML/MIN/1.73M2 — LOW
EGFR: 48 ML/MIN/1.73M2 — LOW
EGFR: 9 ML/MIN/1.73M2 — LOW
ELLIPTOCYTES BLD QL SMEAR: SIGNIFICANT CHANGE UP
ELLIPTOCYTES BLD QL SMEAR: SLIGHT — SIGNIFICANT CHANGE UP
EOSINOPHIL # BLD AUTO: 0.01 K/UL — SIGNIFICANT CHANGE UP (ref 0–0.5)
EOSINOPHIL # BLD AUTO: 0.02 K/UL — SIGNIFICANT CHANGE UP (ref 0–0.5)
EOSINOPHIL # BLD AUTO: 0.05 K/UL — SIGNIFICANT CHANGE UP (ref 0–0.5)
EOSINOPHIL # BLD AUTO: 0.05 K/UL — SIGNIFICANT CHANGE UP (ref 0–0.5)
EOSINOPHIL # BLD AUTO: 0.06 K/UL — SIGNIFICANT CHANGE UP (ref 0–0.5)
EOSINOPHIL # BLD AUTO: 0.14 K/UL — SIGNIFICANT CHANGE UP (ref 0–0.5)
EOSINOPHIL # BLD AUTO: 0.14 K/UL — SIGNIFICANT CHANGE UP (ref 0–0.5)
EOSINOPHIL # BLD AUTO: 0.16 K/UL — SIGNIFICANT CHANGE UP (ref 0–0.5)
EOSINOPHIL # BLD AUTO: 0.2 K/UL — SIGNIFICANT CHANGE UP (ref 0–0.5)
EOSINOPHIL # BLD AUTO: 0.22 K/UL — SIGNIFICANT CHANGE UP (ref 0–0.5)
EOSINOPHIL # BLD AUTO: 0.25 K/UL — SIGNIFICANT CHANGE UP (ref 0–0.5)
EOSINOPHIL # BLD AUTO: 0.28 K/UL — SIGNIFICANT CHANGE UP (ref 0–0.5)
EOSINOPHIL # BLD AUTO: 0.29 K/UL
EOSINOPHIL # BLD AUTO: 0.3 K/UL — SIGNIFICANT CHANGE UP (ref 0–0.5)
EOSINOPHIL # BLD AUTO: 0.34 K/UL — SIGNIFICANT CHANGE UP (ref 0–0.5)
EOSINOPHIL # BLD AUTO: 0.4 K/UL — SIGNIFICANT CHANGE UP (ref 0–0.5)
EOSINOPHIL # BLD AUTO: 0.4 K/UL — SIGNIFICANT CHANGE UP (ref 0–0.5)
EOSINOPHIL # BLD AUTO: 0.41 K/UL — SIGNIFICANT CHANGE UP (ref 0–0.5)
EOSINOPHIL # BLD AUTO: 0.41 K/UL — SIGNIFICANT CHANGE UP (ref 0–0.5)
EOSINOPHIL # BLD AUTO: 0.45 K/UL — SIGNIFICANT CHANGE UP (ref 0–0.5)
EOSINOPHIL # BLD AUTO: 0.56 K/UL — HIGH (ref 0–0.5)
EOSINOPHIL # BLD AUTO: 0.66 K/UL — HIGH (ref 0–0.5)
EOSINOPHIL # BLD AUTO: 0.68 K/UL — HIGH (ref 0–0.5)
EOSINOPHIL # BLD AUTO: 0.72 K/UL — HIGH (ref 0–0.5)
EOSINOPHIL # BLD AUTO: 0.73 K/UL — HIGH (ref 0–0.5)
EOSINOPHIL # BLD AUTO: 0.76 K/UL — HIGH (ref 0–0.5)
EOSINOPHIL # BLD AUTO: 0.77 K/UL — HIGH (ref 0–0.5)
EOSINOPHIL # BLD AUTO: 0.79 K/UL — HIGH (ref 0–0.5)
EOSINOPHIL # BLD AUTO: 0.84 K/UL — HIGH (ref 0–0.5)
EOSINOPHIL # BLD AUTO: 0.86 K/UL — HIGH (ref 0–0.5)
EOSINOPHIL # BLD AUTO: 0.87 K/UL — HIGH (ref 0–0.5)
EOSINOPHIL # BLD AUTO: 0.87 K/UL — HIGH (ref 0–0.5)
EOSINOPHIL # BLD AUTO: 0.9 K/UL — HIGH (ref 0–0.5)
EOSINOPHIL # BLD AUTO: 0.92 K/UL — HIGH (ref 0–0.5)
EOSINOPHIL # BLD AUTO: 0.92 K/UL — HIGH (ref 0–0.5)
EOSINOPHIL # BLD AUTO: 0.93 K/UL — HIGH (ref 0–0.5)
EOSINOPHIL # BLD AUTO: 0.94 K/UL — HIGH (ref 0–0.5)
EOSINOPHIL # BLD AUTO: 0.96 K/UL — HIGH (ref 0–0.5)
EOSINOPHIL # BLD AUTO: 0.97 K/UL — HIGH (ref 0–0.5)
EOSINOPHIL # BLD AUTO: 0.98 K/UL — HIGH (ref 0–0.5)
EOSINOPHIL # BLD AUTO: 0.99 K/UL — HIGH (ref 0–0.5)
EOSINOPHIL # BLD AUTO: 0.99 K/UL — HIGH (ref 0–0.5)
EOSINOPHIL # BLD AUTO: 1 K/UL — HIGH (ref 0–0.5)
EOSINOPHIL # BLD AUTO: 1 K/UL — HIGH (ref 0–0.5)
EOSINOPHIL # BLD AUTO: 1.02 K/UL — HIGH (ref 0–0.5)
EOSINOPHIL # BLD AUTO: 1.05 K/UL — HIGH (ref 0–0.5)
EOSINOPHIL # BLD AUTO: 1.05 K/UL — HIGH (ref 0–0.5)
EOSINOPHIL # BLD AUTO: 1.06 K/UL — HIGH (ref 0–0.5)
EOSINOPHIL # BLD AUTO: 1.14 K/UL — HIGH (ref 0–0.5)
EOSINOPHIL # BLD AUTO: 1.2 K/UL — HIGH (ref 0–0.5)
EOSINOPHIL # BLD AUTO: 1.29 K/UL — HIGH (ref 0–0.5)
EOSINOPHIL # BLD AUTO: 1.3 K/UL — HIGH (ref 0–0.5)
EOSINOPHIL # BLD AUTO: 1.37 K/UL — HIGH (ref 0–0.5)
EOSINOPHIL # BLD AUTO: 1.45 K/UL — HIGH (ref 0–0.5)
EOSINOPHIL # BLD AUTO: 1.47 K/UL — HIGH (ref 0–0.5)
EOSINOPHIL # BLD AUTO: 1.47 K/UL — HIGH (ref 0–0.5)
EOSINOPHIL # BLD AUTO: 1.53 K/UL — HIGH (ref 0–0.5)
EOSINOPHIL # BLD AUTO: 1.54 K/UL — HIGH (ref 0–0.5)
EOSINOPHIL # BLD AUTO: 1.95 K/UL — HIGH (ref 0–0.5)
EOSINOPHIL NFR BLD AUTO: 0 % — SIGNIFICANT CHANGE UP (ref 0–6)
EOSINOPHIL NFR BLD AUTO: 0.1 % — SIGNIFICANT CHANGE UP (ref 0–6)
EOSINOPHIL NFR BLD AUTO: 0.3 % — SIGNIFICANT CHANGE UP (ref 0–6)
EOSINOPHIL NFR BLD AUTO: 0.9 % — SIGNIFICANT CHANGE UP (ref 0–6)
EOSINOPHIL NFR BLD AUTO: 1.7 % — SIGNIFICANT CHANGE UP (ref 0–6)
EOSINOPHIL NFR BLD AUTO: 1.7 % — SIGNIFICANT CHANGE UP (ref 0–6)
EOSINOPHIL NFR BLD AUTO: 10 % — HIGH (ref 0–6)
EOSINOPHIL NFR BLD AUTO: 10.1 % — HIGH (ref 0–6)
EOSINOPHIL NFR BLD AUTO: 10.5 % — HIGH (ref 0–6)
EOSINOPHIL NFR BLD AUTO: 10.5 % — HIGH (ref 0–6)
EOSINOPHIL NFR BLD AUTO: 10.8 % — HIGH (ref 0–6)
EOSINOPHIL NFR BLD AUTO: 11.3 % — HIGH (ref 0–6)
EOSINOPHIL NFR BLD AUTO: 11.6 % — HIGH (ref 0–6)
EOSINOPHIL NFR BLD AUTO: 12.1 % — HIGH (ref 0–6)
EOSINOPHIL NFR BLD AUTO: 12.3 % — HIGH (ref 0–6)
EOSINOPHIL NFR BLD AUTO: 12.5 % — HIGH (ref 0–6)
EOSINOPHIL NFR BLD AUTO: 13.5 % — HIGH (ref 0–6)
EOSINOPHIL NFR BLD AUTO: 13.5 % — HIGH (ref 0–6)
EOSINOPHIL NFR BLD AUTO: 13.6 % — HIGH (ref 0–6)
EOSINOPHIL NFR BLD AUTO: 13.7 % — HIGH (ref 0–6)
EOSINOPHIL NFR BLD AUTO: 13.8 % — HIGH (ref 0–6)
EOSINOPHIL NFR BLD AUTO: 15.1 % — HIGH (ref 0–6)
EOSINOPHIL NFR BLD AUTO: 16.2 % — HIGH (ref 0–6)
EOSINOPHIL NFR BLD AUTO: 16.6 % — HIGH (ref 0–6)
EOSINOPHIL NFR BLD AUTO: 17.8 % — HIGH (ref 0–6)
EOSINOPHIL NFR BLD AUTO: 18.5 % — HIGH (ref 0–6)
EOSINOPHIL NFR BLD AUTO: 18.6 % — HIGH (ref 0–6)
EOSINOPHIL NFR BLD AUTO: 2.6 % — SIGNIFICANT CHANGE UP (ref 0–6)
EOSINOPHIL NFR BLD AUTO: 3 % — SIGNIFICANT CHANGE UP (ref 0–6)
EOSINOPHIL NFR BLD AUTO: 3.5 % — SIGNIFICANT CHANGE UP (ref 0–6)
EOSINOPHIL NFR BLD AUTO: 3.7 % — SIGNIFICANT CHANGE UP (ref 0–6)
EOSINOPHIL NFR BLD AUTO: 4.3 % — SIGNIFICANT CHANGE UP (ref 0–6)
EOSINOPHIL NFR BLD AUTO: 4.3 % — SIGNIFICANT CHANGE UP (ref 0–6)
EOSINOPHIL NFR BLD AUTO: 4.4 % — SIGNIFICANT CHANGE UP (ref 0–6)
EOSINOPHIL NFR BLD AUTO: 4.5 % — SIGNIFICANT CHANGE UP (ref 0–6)
EOSINOPHIL NFR BLD AUTO: 4.5 % — SIGNIFICANT CHANGE UP (ref 0–6)
EOSINOPHIL NFR BLD AUTO: 4.6 % — SIGNIFICANT CHANGE UP (ref 0–6)
EOSINOPHIL NFR BLD AUTO: 5.2 % — SIGNIFICANT CHANGE UP (ref 0–6)
EOSINOPHIL NFR BLD AUTO: 5.6 %
EOSINOPHIL NFR BLD AUTO: 5.7 % — SIGNIFICANT CHANGE UP (ref 0–6)
EOSINOPHIL NFR BLD AUTO: 6 % — SIGNIFICANT CHANGE UP (ref 0–6)
EOSINOPHIL NFR BLD AUTO: 6.3 % — HIGH (ref 0–6)
EOSINOPHIL NFR BLD AUTO: 6.8 % — HIGH (ref 0–6)
EOSINOPHIL NFR BLD AUTO: 7.1 % — HIGH (ref 0–6)
EOSINOPHIL NFR BLD AUTO: 7.3 % — HIGH (ref 0–6)
EOSINOPHIL NFR BLD AUTO: 7.4 % — HIGH (ref 0–6)
EOSINOPHIL NFR BLD AUTO: 7.4 % — HIGH (ref 0–6)
EOSINOPHIL NFR BLD AUTO: 7.7 % — HIGH (ref 0–6)
EOSINOPHIL NFR BLD AUTO: 7.9 % — HIGH (ref 0–6)
EOSINOPHIL NFR BLD AUTO: 8.1 % — HIGH (ref 0–6)
EOSINOPHIL NFR BLD AUTO: 8.1 % — HIGH (ref 0–6)
EOSINOPHIL NFR BLD AUTO: 8.4 % — HIGH (ref 0–6)
EOSINOPHIL NFR BLD AUTO: 8.4 % — HIGH (ref 0–6)
EOSINOPHIL NFR BLD AUTO: 8.6 % — HIGH (ref 0–6)
EOSINOPHIL NFR BLD AUTO: 9 % — HIGH (ref 0–6)
EOSINOPHIL NFR BLD AUTO: 9.3 % — HIGH (ref 0–6)
EOSINOPHIL NFR BLD AUTO: 9.5 % — HIGH (ref 0–6)
EOSINOPHIL NFR BLD AUTO: 9.8 % — HIGH (ref 0–6)
EOSINOPHIL NFR BLD AUTO: 9.8 % — HIGH (ref 0–6)
EOSINOPHIL NFR BLD AUTO: 9.9 % — HIGH (ref 0–6)
EOSINOPHIL NFR BLD AUTO: 9.9 % — HIGH (ref 0–6)
EPI CELLS # UR: 5 — SIGNIFICANT CHANGE UP
ESTIMATED AVERAGE GLUCOSE: 128 MG/DL — HIGH (ref 68–114)
ESTIMATED AVERAGE GLUCOSE: 148 MG/DL — HIGH (ref 68–114)
ESTIMATED AVERAGE GLUCOSE: 148 MG/DL — HIGH (ref 68–114)
ESTIMATED AVERAGE GLUCOSE: 151 MG/DL
ESTIMATED AVERAGE GLUCOSE: 166 MG/DL — HIGH (ref 68–114)
FIBRINOGEN PPP-MCNC: 162 MG/DL — LOW (ref 200–470)
FIBRINOGEN PPP-MCNC: 188 MG/DL — LOW (ref 200–470)
FLUAV AG NPH QL: SIGNIFICANT CHANGE UP
FLUBV AG NPH QL: SIGNIFICANT CHANGE UP
FOLATE SERPL-MCNC: 11.4 NG/ML — SIGNIFICANT CHANGE UP
GAMMA INTERFERON BACKGROUND BLD IA-ACNC: 0.04 IU/ML — SIGNIFICANT CHANGE UP
GAS PNL BLDA: SIGNIFICANT CHANGE UP
GAS PNL BLDMV: SIGNIFICANT CHANGE UP
GAS PNL BLDV: 127 MMOL/L — LOW (ref 136–145)
GAS PNL BLDV: 127 MMOL/L — LOW (ref 136–145)
GAS PNL BLDV: 130 MMOL/L — LOW (ref 136–145)
GAS PNL BLDV: 133 MMOL/L — LOW (ref 136–145)
GAS PNL BLDV: SIGNIFICANT CHANGE UP
GFR/BSA.PRED SERPLBLD CYS-BASED-ARV: 13 ML/MIN/1.73M2 — LOW
GFR/BSA.PRED SERPLBLD CYS-BASED-ARV: 13 ML/MIN/1.73M2 — LOW
GIANT PLATELETS BLD QL SMEAR: PRESENT — SIGNIFICANT CHANGE UP
GLUCOSE BLDC GLUCOMTR-MCNC: 100 MG/DL — HIGH (ref 70–99)
GLUCOSE BLDC GLUCOMTR-MCNC: 101 MG/DL — HIGH (ref 70–99)
GLUCOSE BLDC GLUCOMTR-MCNC: 102 MG/DL — HIGH (ref 70–99)
GLUCOSE BLDC GLUCOMTR-MCNC: 103 MG/DL — HIGH (ref 70–99)
GLUCOSE BLDC GLUCOMTR-MCNC: 105 MG/DL — HIGH (ref 70–99)
GLUCOSE BLDC GLUCOMTR-MCNC: 105 MG/DL — HIGH (ref 70–99)
GLUCOSE BLDC GLUCOMTR-MCNC: 106 MG/DL — HIGH (ref 70–99)
GLUCOSE BLDC GLUCOMTR-MCNC: 107 MG/DL — HIGH (ref 70–99)
GLUCOSE BLDC GLUCOMTR-MCNC: 108 MG/DL — HIGH (ref 70–99)
GLUCOSE BLDC GLUCOMTR-MCNC: 109 MG/DL — HIGH (ref 70–99)
GLUCOSE BLDC GLUCOMTR-MCNC: 109 MG/DL — HIGH (ref 70–99)
GLUCOSE BLDC GLUCOMTR-MCNC: 110 MG/DL — HIGH (ref 70–99)
GLUCOSE BLDC GLUCOMTR-MCNC: 111 MG/DL — HIGH (ref 70–99)
GLUCOSE BLDC GLUCOMTR-MCNC: 112 MG/DL — HIGH (ref 70–99)
GLUCOSE BLDC GLUCOMTR-MCNC: 114 MG/DL — HIGH (ref 70–99)
GLUCOSE BLDC GLUCOMTR-MCNC: 115 MG/DL — HIGH (ref 70–99)
GLUCOSE BLDC GLUCOMTR-MCNC: 116 MG/DL — HIGH (ref 70–99)
GLUCOSE BLDC GLUCOMTR-MCNC: 119 MG/DL — HIGH (ref 70–99)
GLUCOSE BLDC GLUCOMTR-MCNC: 121 MG/DL — HIGH (ref 70–99)
GLUCOSE BLDC GLUCOMTR-MCNC: 121 MG/DL — HIGH (ref 70–99)
GLUCOSE BLDC GLUCOMTR-MCNC: 122 MG/DL — HIGH (ref 70–99)
GLUCOSE BLDC GLUCOMTR-MCNC: 122 MG/DL — HIGH (ref 70–99)
GLUCOSE BLDC GLUCOMTR-MCNC: 123 MG/DL — HIGH (ref 70–99)
GLUCOSE BLDC GLUCOMTR-MCNC: 123 MG/DL — HIGH (ref 70–99)
GLUCOSE BLDC GLUCOMTR-MCNC: 124 MG/DL — HIGH (ref 70–99)
GLUCOSE BLDC GLUCOMTR-MCNC: 124 MG/DL — HIGH (ref 70–99)
GLUCOSE BLDC GLUCOMTR-MCNC: 125 MG/DL — HIGH (ref 70–99)
GLUCOSE BLDC GLUCOMTR-MCNC: 126 MG/DL — HIGH (ref 70–99)
GLUCOSE BLDC GLUCOMTR-MCNC: 127 MG/DL — HIGH (ref 70–99)
GLUCOSE BLDC GLUCOMTR-MCNC: 128 MG/DL — HIGH (ref 70–99)
GLUCOSE BLDC GLUCOMTR-MCNC: 128 MG/DL — HIGH (ref 70–99)
GLUCOSE BLDC GLUCOMTR-MCNC: 129 MG/DL — HIGH (ref 70–99)
GLUCOSE BLDC GLUCOMTR-MCNC: 129 MG/DL — HIGH (ref 70–99)
GLUCOSE BLDC GLUCOMTR-MCNC: 130 MG/DL — HIGH (ref 70–99)
GLUCOSE BLDC GLUCOMTR-MCNC: 131 MG/DL — HIGH (ref 70–99)
GLUCOSE BLDC GLUCOMTR-MCNC: 131 MG/DL — HIGH (ref 70–99)
GLUCOSE BLDC GLUCOMTR-MCNC: 132 MG/DL — HIGH (ref 70–99)
GLUCOSE BLDC GLUCOMTR-MCNC: 133 MG/DL — HIGH (ref 70–99)
GLUCOSE BLDC GLUCOMTR-MCNC: 135 MG/DL — HIGH (ref 70–99)
GLUCOSE BLDC GLUCOMTR-MCNC: 135 MG/DL — HIGH (ref 70–99)
GLUCOSE BLDC GLUCOMTR-MCNC: 136 MG/DL — HIGH (ref 70–99)
GLUCOSE BLDC GLUCOMTR-MCNC: 137 MG/DL — HIGH (ref 70–99)
GLUCOSE BLDC GLUCOMTR-MCNC: 139 MG/DL — HIGH (ref 70–99)
GLUCOSE BLDC GLUCOMTR-MCNC: 140 MG/DL — HIGH (ref 70–99)
GLUCOSE BLDC GLUCOMTR-MCNC: 141 MG/DL — HIGH (ref 70–99)
GLUCOSE BLDC GLUCOMTR-MCNC: 142 MG/DL — HIGH (ref 70–99)
GLUCOSE BLDC GLUCOMTR-MCNC: 142 MG/DL — HIGH (ref 70–99)
GLUCOSE BLDC GLUCOMTR-MCNC: 143 MG/DL — HIGH (ref 70–99)
GLUCOSE BLDC GLUCOMTR-MCNC: 144 MG/DL — HIGH (ref 70–99)
GLUCOSE BLDC GLUCOMTR-MCNC: 145 MG/DL — HIGH (ref 70–99)
GLUCOSE BLDC GLUCOMTR-MCNC: 146 MG/DL — HIGH (ref 70–99)
GLUCOSE BLDC GLUCOMTR-MCNC: 147 MG/DL — HIGH (ref 70–99)
GLUCOSE BLDC GLUCOMTR-MCNC: 148 MG/DL — HIGH (ref 70–99)
GLUCOSE BLDC GLUCOMTR-MCNC: 149 MG/DL — HIGH (ref 70–99)
GLUCOSE BLDC GLUCOMTR-MCNC: 149 MG/DL — HIGH (ref 70–99)
GLUCOSE BLDC GLUCOMTR-MCNC: 150 MG/DL — HIGH (ref 70–99)
GLUCOSE BLDC GLUCOMTR-MCNC: 151 MG/DL — HIGH (ref 70–99)
GLUCOSE BLDC GLUCOMTR-MCNC: 151 MG/DL — HIGH (ref 70–99)
GLUCOSE BLDC GLUCOMTR-MCNC: 152 MG/DL — HIGH (ref 70–99)
GLUCOSE BLDC GLUCOMTR-MCNC: 152 MG/DL — HIGH (ref 70–99)
GLUCOSE BLDC GLUCOMTR-MCNC: 153 MG/DL — HIGH (ref 70–99)
GLUCOSE BLDC GLUCOMTR-MCNC: 153 MG/DL — HIGH (ref 70–99)
GLUCOSE BLDC GLUCOMTR-MCNC: 154 MG/DL — HIGH (ref 70–99)
GLUCOSE BLDC GLUCOMTR-MCNC: 155 MG/DL — HIGH (ref 70–99)
GLUCOSE BLDC GLUCOMTR-MCNC: 155 MG/DL — HIGH (ref 70–99)
GLUCOSE BLDC GLUCOMTR-MCNC: 156 MG/DL — HIGH (ref 70–99)
GLUCOSE BLDC GLUCOMTR-MCNC: 157 MG/DL — HIGH (ref 70–99)
GLUCOSE BLDC GLUCOMTR-MCNC: 157 MG/DL — HIGH (ref 70–99)
GLUCOSE BLDC GLUCOMTR-MCNC: 158 MG/DL — HIGH (ref 70–99)
GLUCOSE BLDC GLUCOMTR-MCNC: 158 MG/DL — HIGH (ref 70–99)
GLUCOSE BLDC GLUCOMTR-MCNC: 159 MG/DL — HIGH (ref 70–99)
GLUCOSE BLDC GLUCOMTR-MCNC: 160 MG/DL — HIGH (ref 70–99)
GLUCOSE BLDC GLUCOMTR-MCNC: 160 MG/DL — HIGH (ref 70–99)
GLUCOSE BLDC GLUCOMTR-MCNC: 161 MG/DL — HIGH (ref 70–99)
GLUCOSE BLDC GLUCOMTR-MCNC: 162 MG/DL — HIGH (ref 70–99)
GLUCOSE BLDC GLUCOMTR-MCNC: 164 MG/DL — HIGH (ref 70–99)
GLUCOSE BLDC GLUCOMTR-MCNC: 165 MG/DL — HIGH (ref 70–99)
GLUCOSE BLDC GLUCOMTR-MCNC: 167 MG/DL — HIGH (ref 70–99)
GLUCOSE BLDC GLUCOMTR-MCNC: 168 MG/DL — HIGH (ref 70–99)
GLUCOSE BLDC GLUCOMTR-MCNC: 169 MG/DL — HIGH (ref 70–99)
GLUCOSE BLDC GLUCOMTR-MCNC: 170 MG/DL — HIGH (ref 70–99)
GLUCOSE BLDC GLUCOMTR-MCNC: 171 MG/DL — HIGH (ref 70–99)
GLUCOSE BLDC GLUCOMTR-MCNC: 172 MG/DL — HIGH (ref 70–99)
GLUCOSE BLDC GLUCOMTR-MCNC: 173 MG/DL — HIGH (ref 70–99)
GLUCOSE BLDC GLUCOMTR-MCNC: 174 MG/DL — HIGH (ref 70–99)
GLUCOSE BLDC GLUCOMTR-MCNC: 174 MG/DL — HIGH (ref 70–99)
GLUCOSE BLDC GLUCOMTR-MCNC: 175 MG/DL — HIGH (ref 70–99)
GLUCOSE BLDC GLUCOMTR-MCNC: 177 MG/DL — HIGH (ref 70–99)
GLUCOSE BLDC GLUCOMTR-MCNC: 178 MG/DL — HIGH (ref 70–99)
GLUCOSE BLDC GLUCOMTR-MCNC: 179 MG/DL — HIGH (ref 70–99)
GLUCOSE BLDC GLUCOMTR-MCNC: 179 MG/DL — HIGH (ref 70–99)
GLUCOSE BLDC GLUCOMTR-MCNC: 180 MG/DL — HIGH (ref 70–99)
GLUCOSE BLDC GLUCOMTR-MCNC: 180 MG/DL — HIGH (ref 70–99)
GLUCOSE BLDC GLUCOMTR-MCNC: 181 MG/DL — HIGH (ref 70–99)
GLUCOSE BLDC GLUCOMTR-MCNC: 182 MG/DL — HIGH (ref 70–99)
GLUCOSE BLDC GLUCOMTR-MCNC: 182 MG/DL — HIGH (ref 70–99)
GLUCOSE BLDC GLUCOMTR-MCNC: 183 MG/DL — HIGH (ref 70–99)
GLUCOSE BLDC GLUCOMTR-MCNC: 183 MG/DL — HIGH (ref 70–99)
GLUCOSE BLDC GLUCOMTR-MCNC: 184 MG/DL — HIGH (ref 70–99)
GLUCOSE BLDC GLUCOMTR-MCNC: 185 MG/DL — HIGH (ref 70–99)
GLUCOSE BLDC GLUCOMTR-MCNC: 186 MG/DL — HIGH (ref 70–99)
GLUCOSE BLDC GLUCOMTR-MCNC: 187 MG/DL — HIGH (ref 70–99)
GLUCOSE BLDC GLUCOMTR-MCNC: 187 MG/DL — HIGH (ref 70–99)
GLUCOSE BLDC GLUCOMTR-MCNC: 188 MG/DL — HIGH (ref 70–99)
GLUCOSE BLDC GLUCOMTR-MCNC: 189 MG/DL — HIGH (ref 70–99)
GLUCOSE BLDC GLUCOMTR-MCNC: 190 MG/DL — HIGH (ref 70–99)
GLUCOSE BLDC GLUCOMTR-MCNC: 191 MG/DL — HIGH (ref 70–99)
GLUCOSE BLDC GLUCOMTR-MCNC: 191 MG/DL — HIGH (ref 70–99)
GLUCOSE BLDC GLUCOMTR-MCNC: 193 MG/DL — HIGH (ref 70–99)
GLUCOSE BLDC GLUCOMTR-MCNC: 195 MG/DL — HIGH (ref 70–99)
GLUCOSE BLDC GLUCOMTR-MCNC: 195 MG/DL — HIGH (ref 70–99)
GLUCOSE BLDC GLUCOMTR-MCNC: 196 MG/DL — HIGH (ref 70–99)
GLUCOSE BLDC GLUCOMTR-MCNC: 198 MG/DL — HIGH (ref 70–99)
GLUCOSE BLDC GLUCOMTR-MCNC: 198 MG/DL — HIGH (ref 70–99)
GLUCOSE BLDC GLUCOMTR-MCNC: 199 MG/DL — HIGH (ref 70–99)
GLUCOSE BLDC GLUCOMTR-MCNC: 200 MG/DL — HIGH (ref 70–99)
GLUCOSE BLDC GLUCOMTR-MCNC: 200 MG/DL — HIGH (ref 70–99)
GLUCOSE BLDC GLUCOMTR-MCNC: 201 MG/DL — HIGH (ref 70–99)
GLUCOSE BLDC GLUCOMTR-MCNC: 201 MG/DL — HIGH (ref 70–99)
GLUCOSE BLDC GLUCOMTR-MCNC: 202 MG/DL — HIGH (ref 70–99)
GLUCOSE BLDC GLUCOMTR-MCNC: 203 MG/DL — HIGH (ref 70–99)
GLUCOSE BLDC GLUCOMTR-MCNC: 203 MG/DL — HIGH (ref 70–99)
GLUCOSE BLDC GLUCOMTR-MCNC: 205 MG/DL — HIGH (ref 70–99)
GLUCOSE BLDC GLUCOMTR-MCNC: 206 MG/DL — HIGH (ref 70–99)
GLUCOSE BLDC GLUCOMTR-MCNC: 207 MG/DL — HIGH (ref 70–99)
GLUCOSE BLDC GLUCOMTR-MCNC: 208 MG/DL — HIGH (ref 70–99)
GLUCOSE BLDC GLUCOMTR-MCNC: 208 MG/DL — HIGH (ref 70–99)
GLUCOSE BLDC GLUCOMTR-MCNC: 209 MG/DL — HIGH (ref 70–99)
GLUCOSE BLDC GLUCOMTR-MCNC: 210 MG/DL — HIGH (ref 70–99)
GLUCOSE BLDC GLUCOMTR-MCNC: 211 MG/DL — HIGH (ref 70–99)
GLUCOSE BLDC GLUCOMTR-MCNC: 211 MG/DL — HIGH (ref 70–99)
GLUCOSE BLDC GLUCOMTR-MCNC: 212 MG/DL — HIGH (ref 70–99)
GLUCOSE BLDC GLUCOMTR-MCNC: 214 MG/DL — HIGH (ref 70–99)
GLUCOSE BLDC GLUCOMTR-MCNC: 216 MG/DL — HIGH (ref 70–99)
GLUCOSE BLDC GLUCOMTR-MCNC: 217 MG/DL — HIGH (ref 70–99)
GLUCOSE BLDC GLUCOMTR-MCNC: 217 MG/DL — HIGH (ref 70–99)
GLUCOSE BLDC GLUCOMTR-MCNC: 219 MG/DL — HIGH (ref 70–99)
GLUCOSE BLDC GLUCOMTR-MCNC: 220 MG/DL — HIGH (ref 70–99)
GLUCOSE BLDC GLUCOMTR-MCNC: 221 MG/DL — HIGH (ref 70–99)
GLUCOSE BLDC GLUCOMTR-MCNC: 222 MG/DL — HIGH (ref 70–99)
GLUCOSE BLDC GLUCOMTR-MCNC: 224 MG/DL — HIGH (ref 70–99)
GLUCOSE BLDC GLUCOMTR-MCNC: 225 MG/DL — HIGH (ref 70–99)
GLUCOSE BLDC GLUCOMTR-MCNC: 225 MG/DL — HIGH (ref 70–99)
GLUCOSE BLDC GLUCOMTR-MCNC: 226 MG/DL — HIGH (ref 70–99)
GLUCOSE BLDC GLUCOMTR-MCNC: 228 MG/DL — HIGH (ref 70–99)
GLUCOSE BLDC GLUCOMTR-MCNC: 228 MG/DL — HIGH (ref 70–99)
GLUCOSE BLDC GLUCOMTR-MCNC: 231 MG/DL — HIGH (ref 70–99)
GLUCOSE BLDC GLUCOMTR-MCNC: 231 MG/DL — HIGH (ref 70–99)
GLUCOSE BLDC GLUCOMTR-MCNC: 232 MG/DL — HIGH (ref 70–99)
GLUCOSE BLDC GLUCOMTR-MCNC: 232 MG/DL — HIGH (ref 70–99)
GLUCOSE BLDC GLUCOMTR-MCNC: 233 MG/DL — HIGH (ref 70–99)
GLUCOSE BLDC GLUCOMTR-MCNC: 235 MG/DL — HIGH (ref 70–99)
GLUCOSE BLDC GLUCOMTR-MCNC: 236 MG/DL — HIGH (ref 70–99)
GLUCOSE BLDC GLUCOMTR-MCNC: 236 MG/DL — HIGH (ref 70–99)
GLUCOSE BLDC GLUCOMTR-MCNC: 237 MG/DL — HIGH (ref 70–99)
GLUCOSE BLDC GLUCOMTR-MCNC: 239 MG/DL — HIGH (ref 70–99)
GLUCOSE BLDC GLUCOMTR-MCNC: 240 MG/DL — HIGH (ref 70–99)
GLUCOSE BLDC GLUCOMTR-MCNC: 240 MG/DL — HIGH (ref 70–99)
GLUCOSE BLDC GLUCOMTR-MCNC: 241 MG/DL — HIGH (ref 70–99)
GLUCOSE BLDC GLUCOMTR-MCNC: 241 MG/DL — HIGH (ref 70–99)
GLUCOSE BLDC GLUCOMTR-MCNC: 242 MG/DL — HIGH (ref 70–99)
GLUCOSE BLDC GLUCOMTR-MCNC: 243 MG/DL — HIGH (ref 70–99)
GLUCOSE BLDC GLUCOMTR-MCNC: 244 MG/DL — HIGH (ref 70–99)
GLUCOSE BLDC GLUCOMTR-MCNC: 247 MG/DL — HIGH (ref 70–99)
GLUCOSE BLDC GLUCOMTR-MCNC: 247 MG/DL — HIGH (ref 70–99)
GLUCOSE BLDC GLUCOMTR-MCNC: 249 MG/DL — HIGH (ref 70–99)
GLUCOSE BLDC GLUCOMTR-MCNC: 251 MG/DL — HIGH (ref 70–99)
GLUCOSE BLDC GLUCOMTR-MCNC: 251 MG/DL — HIGH (ref 70–99)
GLUCOSE BLDC GLUCOMTR-MCNC: 253 MG/DL — HIGH (ref 70–99)
GLUCOSE BLDC GLUCOMTR-MCNC: 257 MG/DL — HIGH (ref 70–99)
GLUCOSE BLDC GLUCOMTR-MCNC: 258 MG/DL — HIGH (ref 70–99)
GLUCOSE BLDC GLUCOMTR-MCNC: 260 MG/DL — HIGH (ref 70–99)
GLUCOSE BLDC GLUCOMTR-MCNC: 260 MG/DL — HIGH (ref 70–99)
GLUCOSE BLDC GLUCOMTR-MCNC: 261 MG/DL — HIGH (ref 70–99)
GLUCOSE BLDC GLUCOMTR-MCNC: 262 MG/DL — HIGH (ref 70–99)
GLUCOSE BLDC GLUCOMTR-MCNC: 263 MG/DL — HIGH (ref 70–99)
GLUCOSE BLDC GLUCOMTR-MCNC: 263 MG/DL — HIGH (ref 70–99)
GLUCOSE BLDC GLUCOMTR-MCNC: 265 MG/DL — HIGH (ref 70–99)
GLUCOSE BLDC GLUCOMTR-MCNC: 266 MG/DL — HIGH (ref 70–99)
GLUCOSE BLDC GLUCOMTR-MCNC: 267 MG/DL — HIGH (ref 70–99)
GLUCOSE BLDC GLUCOMTR-MCNC: 268 MG/DL — HIGH (ref 70–99)
GLUCOSE BLDC GLUCOMTR-MCNC: 271 MG/DL — HIGH (ref 70–99)
GLUCOSE BLDC GLUCOMTR-MCNC: 272 MG/DL — HIGH (ref 70–99)
GLUCOSE BLDC GLUCOMTR-MCNC: 272 MG/DL — HIGH (ref 70–99)
GLUCOSE BLDC GLUCOMTR-MCNC: 273 MG/DL — HIGH (ref 70–99)
GLUCOSE BLDC GLUCOMTR-MCNC: 274 MG/DL — HIGH (ref 70–99)
GLUCOSE BLDC GLUCOMTR-MCNC: 274 MG/DL — HIGH (ref 70–99)
GLUCOSE BLDC GLUCOMTR-MCNC: 275 MG/DL — HIGH (ref 70–99)
GLUCOSE BLDC GLUCOMTR-MCNC: 275 MG/DL — HIGH (ref 70–99)
GLUCOSE BLDC GLUCOMTR-MCNC: 282 MG/DL — HIGH (ref 70–99)
GLUCOSE BLDC GLUCOMTR-MCNC: 282 MG/DL — HIGH (ref 70–99)
GLUCOSE BLDC GLUCOMTR-MCNC: 283 MG/DL — HIGH (ref 70–99)
GLUCOSE BLDC GLUCOMTR-MCNC: 284 MG/DL — HIGH (ref 70–99)
GLUCOSE BLDC GLUCOMTR-MCNC: 284 MG/DL — HIGH (ref 70–99)
GLUCOSE BLDC GLUCOMTR-MCNC: 286 MG/DL — HIGH (ref 70–99)
GLUCOSE BLDC GLUCOMTR-MCNC: 287 MG/DL — HIGH (ref 70–99)
GLUCOSE BLDC GLUCOMTR-MCNC: 288 MG/DL — HIGH (ref 70–99)
GLUCOSE BLDC GLUCOMTR-MCNC: 294 MG/DL — HIGH (ref 70–99)
GLUCOSE BLDC GLUCOMTR-MCNC: 295 MG/DL — HIGH (ref 70–99)
GLUCOSE BLDC GLUCOMTR-MCNC: 299 MG/DL — HIGH (ref 70–99)
GLUCOSE BLDC GLUCOMTR-MCNC: 300 MG/DL — HIGH (ref 70–99)
GLUCOSE BLDC GLUCOMTR-MCNC: 301 MG/DL — HIGH (ref 70–99)
GLUCOSE BLDC GLUCOMTR-MCNC: 302 MG/DL — HIGH (ref 70–99)
GLUCOSE BLDC GLUCOMTR-MCNC: 302 MG/DL — HIGH (ref 70–99)
GLUCOSE BLDC GLUCOMTR-MCNC: 306 MG/DL — HIGH (ref 70–99)
GLUCOSE BLDC GLUCOMTR-MCNC: 308 MG/DL — HIGH (ref 70–99)
GLUCOSE BLDC GLUCOMTR-MCNC: 314 MG/DL — HIGH (ref 70–99)
GLUCOSE BLDC GLUCOMTR-MCNC: 316 MG/DL — HIGH (ref 70–99)
GLUCOSE BLDC GLUCOMTR-MCNC: 321 MG/DL — HIGH (ref 70–99)
GLUCOSE BLDC GLUCOMTR-MCNC: 332 MG/DL — HIGH (ref 70–99)
GLUCOSE BLDC GLUCOMTR-MCNC: 335 MG/DL — HIGH (ref 70–99)
GLUCOSE BLDC GLUCOMTR-MCNC: 354 MG/DL — HIGH (ref 70–99)
GLUCOSE BLDC GLUCOMTR-MCNC: 368 MG/DL — HIGH (ref 70–99)
GLUCOSE BLDC GLUCOMTR-MCNC: 386 MG/DL — HIGH (ref 70–99)
GLUCOSE BLDC GLUCOMTR-MCNC: 422 MG/DL — HIGH (ref 70–99)
GLUCOSE BLDC GLUCOMTR-MCNC: 447 MG/DL — HIGH (ref 70–99)
GLUCOSE BLDC GLUCOMTR-MCNC: 460 MG/DL — CRITICAL HIGH (ref 70–99)
GLUCOSE BLDC GLUCOMTR-MCNC: 463 MG/DL — CRITICAL HIGH (ref 70–99)
GLUCOSE BLDC GLUCOMTR-MCNC: 469 MG/DL — CRITICAL HIGH (ref 70–99)
GLUCOSE BLDC GLUCOMTR-MCNC: 47 MG/DL — CRITICAL LOW (ref 70–99)
GLUCOSE BLDC GLUCOMTR-MCNC: 472 MG/DL — CRITICAL HIGH (ref 70–99)
GLUCOSE BLDC GLUCOMTR-MCNC: 488 MG/DL — CRITICAL HIGH (ref 70–99)
GLUCOSE BLDC GLUCOMTR-MCNC: 49 MG/DL — CRITICAL LOW (ref 70–99)
GLUCOSE BLDC GLUCOMTR-MCNC: 51 MG/DL — CRITICAL LOW (ref 70–99)
GLUCOSE BLDC GLUCOMTR-MCNC: 52 MG/DL — CRITICAL LOW (ref 70–99)
GLUCOSE BLDC GLUCOMTR-MCNC: 53 MG/DL — CRITICAL LOW (ref 70–99)
GLUCOSE BLDC GLUCOMTR-MCNC: 54 MG/DL — CRITICAL LOW (ref 70–99)
GLUCOSE BLDC GLUCOMTR-MCNC: 56 MG/DL — LOW (ref 70–99)
GLUCOSE BLDC GLUCOMTR-MCNC: 57 MG/DL — LOW (ref 70–99)
GLUCOSE BLDC GLUCOMTR-MCNC: 63 MG/DL — LOW (ref 70–99)
GLUCOSE BLDC GLUCOMTR-MCNC: 64 MG/DL — LOW (ref 70–99)
GLUCOSE BLDC GLUCOMTR-MCNC: 67 MG/DL — LOW (ref 70–99)
GLUCOSE BLDC GLUCOMTR-MCNC: 68 MG/DL — LOW (ref 70–99)
GLUCOSE BLDC GLUCOMTR-MCNC: 69 MG/DL — LOW (ref 70–99)
GLUCOSE BLDC GLUCOMTR-MCNC: 69 MG/DL — LOW (ref 70–99)
GLUCOSE BLDC GLUCOMTR-MCNC: 71 MG/DL — SIGNIFICANT CHANGE UP (ref 70–99)
GLUCOSE BLDC GLUCOMTR-MCNC: 71 MG/DL — SIGNIFICANT CHANGE UP (ref 70–99)
GLUCOSE BLDC GLUCOMTR-MCNC: 72 MG/DL — SIGNIFICANT CHANGE UP (ref 70–99)
GLUCOSE BLDC GLUCOMTR-MCNC: 73 MG/DL — SIGNIFICANT CHANGE UP (ref 70–99)
GLUCOSE BLDC GLUCOMTR-MCNC: 74 MG/DL — SIGNIFICANT CHANGE UP (ref 70–99)
GLUCOSE BLDC GLUCOMTR-MCNC: 74 MG/DL — SIGNIFICANT CHANGE UP (ref 70–99)
GLUCOSE BLDC GLUCOMTR-MCNC: 75 MG/DL — SIGNIFICANT CHANGE UP (ref 70–99)
GLUCOSE BLDC GLUCOMTR-MCNC: 76 MG/DL — SIGNIFICANT CHANGE UP (ref 70–99)
GLUCOSE BLDC GLUCOMTR-MCNC: 77 MG/DL — SIGNIFICANT CHANGE UP (ref 70–99)
GLUCOSE BLDC GLUCOMTR-MCNC: 77 MG/DL — SIGNIFICANT CHANGE UP (ref 70–99)
GLUCOSE BLDC GLUCOMTR-MCNC: 79 MG/DL — SIGNIFICANT CHANGE UP (ref 70–99)
GLUCOSE BLDC GLUCOMTR-MCNC: 79 MG/DL — SIGNIFICANT CHANGE UP (ref 70–99)
GLUCOSE BLDC GLUCOMTR-MCNC: 81 MG/DL — SIGNIFICANT CHANGE UP (ref 70–99)
GLUCOSE BLDC GLUCOMTR-MCNC: 82 MG/DL — SIGNIFICANT CHANGE UP (ref 70–99)
GLUCOSE BLDC GLUCOMTR-MCNC: 82 MG/DL — SIGNIFICANT CHANGE UP (ref 70–99)
GLUCOSE BLDC GLUCOMTR-MCNC: 83 MG/DL — SIGNIFICANT CHANGE UP (ref 70–99)
GLUCOSE BLDC GLUCOMTR-MCNC: 85 MG/DL — SIGNIFICANT CHANGE UP (ref 70–99)
GLUCOSE BLDC GLUCOMTR-MCNC: 86 MG/DL — SIGNIFICANT CHANGE UP (ref 70–99)
GLUCOSE BLDC GLUCOMTR-MCNC: 87 MG/DL — SIGNIFICANT CHANGE UP (ref 70–99)
GLUCOSE BLDC GLUCOMTR-MCNC: 89 MG/DL — SIGNIFICANT CHANGE UP (ref 70–99)
GLUCOSE BLDC GLUCOMTR-MCNC: 89 MG/DL — SIGNIFICANT CHANGE UP (ref 70–99)
GLUCOSE BLDC GLUCOMTR-MCNC: 90 MG/DL — SIGNIFICANT CHANGE UP (ref 70–99)
GLUCOSE BLDC GLUCOMTR-MCNC: 91 MG/DL — SIGNIFICANT CHANGE UP (ref 70–99)
GLUCOSE BLDC GLUCOMTR-MCNC: 91 MG/DL — SIGNIFICANT CHANGE UP (ref 70–99)
GLUCOSE BLDC GLUCOMTR-MCNC: 92 MG/DL — SIGNIFICANT CHANGE UP (ref 70–99)
GLUCOSE BLDC GLUCOMTR-MCNC: 92 MG/DL — SIGNIFICANT CHANGE UP (ref 70–99)
GLUCOSE BLDC GLUCOMTR-MCNC: 93 MG/DL — SIGNIFICANT CHANGE UP (ref 70–99)
GLUCOSE BLDC GLUCOMTR-MCNC: 94 MG/DL — SIGNIFICANT CHANGE UP (ref 70–99)
GLUCOSE BLDC GLUCOMTR-MCNC: 96 MG/DL — SIGNIFICANT CHANGE UP (ref 70–99)
GLUCOSE BLDC GLUCOMTR-MCNC: 98 MG/DL — SIGNIFICANT CHANGE UP (ref 70–99)
GLUCOSE BLDC GLUCOMTR-MCNC: 99 MG/DL — SIGNIFICANT CHANGE UP (ref 70–99)
GLUCOSE BLDV-MCNC: 110 MG/DL — HIGH (ref 70–99)
GLUCOSE BLDV-MCNC: 122 MG/DL — HIGH (ref 70–99)
GLUCOSE BLDV-MCNC: 195 MG/DL — HIGH (ref 70–99)
GLUCOSE BLDV-MCNC: 92 MG/DL — SIGNIFICANT CHANGE UP (ref 70–99)
GLUCOSE SERPL-MCNC: 107 MG/DL — HIGH (ref 70–99)
GLUCOSE SERPL-MCNC: 110 MG/DL — HIGH (ref 70–99)
GLUCOSE SERPL-MCNC: 111 MG/DL — HIGH (ref 70–99)
GLUCOSE SERPL-MCNC: 113 MG/DL — HIGH (ref 70–99)
GLUCOSE SERPL-MCNC: 120 MG/DL — HIGH (ref 70–99)
GLUCOSE SERPL-MCNC: 121 MG/DL — HIGH (ref 70–99)
GLUCOSE SERPL-MCNC: 121 MG/DL — HIGH (ref 70–99)
GLUCOSE SERPL-MCNC: 122 MG/DL — HIGH (ref 70–99)
GLUCOSE SERPL-MCNC: 124 MG/DL — HIGH (ref 70–99)
GLUCOSE SERPL-MCNC: 127 MG/DL — HIGH (ref 70–99)
GLUCOSE SERPL-MCNC: 131 MG/DL — HIGH (ref 70–99)
GLUCOSE SERPL-MCNC: 132 MG/DL — HIGH (ref 70–99)
GLUCOSE SERPL-MCNC: 136 MG/DL — HIGH (ref 70–99)
GLUCOSE SERPL-MCNC: 137 MG/DL — HIGH (ref 70–99)
GLUCOSE SERPL-MCNC: 137 MG/DL — HIGH (ref 70–99)
GLUCOSE SERPL-MCNC: 138 MG/DL — HIGH (ref 70–99)
GLUCOSE SERPL-MCNC: 139 MG/DL — HIGH (ref 70–99)
GLUCOSE SERPL-MCNC: 140 MG/DL — HIGH (ref 70–99)
GLUCOSE SERPL-MCNC: 141 MG/DL — HIGH (ref 70–99)
GLUCOSE SERPL-MCNC: 142 MG/DL — HIGH (ref 70–99)
GLUCOSE SERPL-MCNC: 142 MG/DL — HIGH (ref 70–99)
GLUCOSE SERPL-MCNC: 144 MG/DL — HIGH (ref 70–99)
GLUCOSE SERPL-MCNC: 144 MG/DL — HIGH (ref 70–99)
GLUCOSE SERPL-MCNC: 145 MG/DL — HIGH (ref 70–99)
GLUCOSE SERPL-MCNC: 145 MG/DL — HIGH (ref 70–99)
GLUCOSE SERPL-MCNC: 146 MG/DL — HIGH (ref 70–99)
GLUCOSE SERPL-MCNC: 147 MG/DL — HIGH (ref 70–99)
GLUCOSE SERPL-MCNC: 150 MG/DL — HIGH (ref 70–99)
GLUCOSE SERPL-MCNC: 154 MG/DL — HIGH (ref 70–99)
GLUCOSE SERPL-MCNC: 154 MG/DL — HIGH (ref 70–99)
GLUCOSE SERPL-MCNC: 155 MG/DL — HIGH (ref 70–99)
GLUCOSE SERPL-MCNC: 156 MG/DL — HIGH (ref 70–99)
GLUCOSE SERPL-MCNC: 158 MG/DL — HIGH (ref 70–99)
GLUCOSE SERPL-MCNC: 158 MG/DL — HIGH (ref 70–99)
GLUCOSE SERPL-MCNC: 159 MG/DL — HIGH (ref 70–99)
GLUCOSE SERPL-MCNC: 161 MG/DL — HIGH (ref 70–99)
GLUCOSE SERPL-MCNC: 163 MG/DL — HIGH (ref 70–99)
GLUCOSE SERPL-MCNC: 166 MG/DL — HIGH (ref 70–99)
GLUCOSE SERPL-MCNC: 171 MG/DL — HIGH (ref 70–99)
GLUCOSE SERPL-MCNC: 172 MG/DL — HIGH (ref 70–99)
GLUCOSE SERPL-MCNC: 177 MG/DL — HIGH (ref 70–99)
GLUCOSE SERPL-MCNC: 177 MG/DL — HIGH (ref 70–99)
GLUCOSE SERPL-MCNC: 179 MG/DL — HIGH (ref 70–99)
GLUCOSE SERPL-MCNC: 179 MG/DL — HIGH (ref 70–99)
GLUCOSE SERPL-MCNC: 181 MG/DL
GLUCOSE SERPL-MCNC: 186 MG/DL — HIGH (ref 70–99)
GLUCOSE SERPL-MCNC: 187 MG/DL — HIGH (ref 70–99)
GLUCOSE SERPL-MCNC: 188 MG/DL — HIGH (ref 70–99)
GLUCOSE SERPL-MCNC: 190 MG/DL — HIGH (ref 70–99)
GLUCOSE SERPL-MCNC: 191 MG/DL — HIGH (ref 70–99)
GLUCOSE SERPL-MCNC: 192 MG/DL — HIGH (ref 70–99)
GLUCOSE SERPL-MCNC: 192 MG/DL — HIGH (ref 70–99)
GLUCOSE SERPL-MCNC: 193 MG/DL — HIGH (ref 70–99)
GLUCOSE SERPL-MCNC: 194 MG/DL — HIGH (ref 70–99)
GLUCOSE SERPL-MCNC: 198 MG/DL — HIGH (ref 70–99)
GLUCOSE SERPL-MCNC: 199 MG/DL — HIGH (ref 70–99)
GLUCOSE SERPL-MCNC: 199 MG/DL — HIGH (ref 70–99)
GLUCOSE SERPL-MCNC: 200 MG/DL — HIGH (ref 70–99)
GLUCOSE SERPL-MCNC: 201 MG/DL — HIGH (ref 70–99)
GLUCOSE SERPL-MCNC: 202 MG/DL — HIGH (ref 70–99)
GLUCOSE SERPL-MCNC: 207 MG/DL — HIGH (ref 70–99)
GLUCOSE SERPL-MCNC: 209 MG/DL — HIGH (ref 70–99)
GLUCOSE SERPL-MCNC: 211 MG/DL — HIGH (ref 70–99)
GLUCOSE SERPL-MCNC: 218 MG/DL — HIGH (ref 70–99)
GLUCOSE SERPL-MCNC: 225 MG/DL — HIGH (ref 70–99)
GLUCOSE SERPL-MCNC: 228 MG/DL — HIGH (ref 70–99)
GLUCOSE SERPL-MCNC: 237 MG/DL — HIGH (ref 70–99)
GLUCOSE SERPL-MCNC: 239 MG/DL — HIGH (ref 70–99)
GLUCOSE SERPL-MCNC: 240 MG/DL — HIGH (ref 70–99)
GLUCOSE SERPL-MCNC: 241 MG/DL — HIGH (ref 70–99)
GLUCOSE SERPL-MCNC: 242 MG/DL — HIGH (ref 70–99)
GLUCOSE SERPL-MCNC: 242 MG/DL — HIGH (ref 70–99)
GLUCOSE SERPL-MCNC: 247 MG/DL — HIGH (ref 70–99)
GLUCOSE SERPL-MCNC: 247 MG/DL — HIGH (ref 70–99)
GLUCOSE SERPL-MCNC: 251 MG/DL — HIGH (ref 70–99)
GLUCOSE SERPL-MCNC: 253 MG/DL — HIGH (ref 70–99)
GLUCOSE SERPL-MCNC: 257 MG/DL — HIGH (ref 70–99)
GLUCOSE SERPL-MCNC: 260 MG/DL — HIGH (ref 70–99)
GLUCOSE SERPL-MCNC: 260 MG/DL — HIGH (ref 70–99)
GLUCOSE SERPL-MCNC: 261 MG/DL — HIGH (ref 70–99)
GLUCOSE SERPL-MCNC: 262 MG/DL — HIGH (ref 70–99)
GLUCOSE SERPL-MCNC: 265 MG/DL — HIGH (ref 70–99)
GLUCOSE SERPL-MCNC: 268 MG/DL — HIGH (ref 70–99)
GLUCOSE SERPL-MCNC: 275 MG/DL — HIGH (ref 70–99)
GLUCOSE SERPL-MCNC: 277 MG/DL — HIGH (ref 70–99)
GLUCOSE SERPL-MCNC: 281 MG/DL — HIGH (ref 70–99)
GLUCOSE SERPL-MCNC: 284 MG/DL — HIGH (ref 70–99)
GLUCOSE SERPL-MCNC: 284 MG/DL — HIGH (ref 70–99)
GLUCOSE SERPL-MCNC: 286 MG/DL — HIGH (ref 70–99)
GLUCOSE SERPL-MCNC: 298 MG/DL — HIGH (ref 70–99)
GLUCOSE SERPL-MCNC: 301 MG/DL — HIGH (ref 70–99)
GLUCOSE SERPL-MCNC: 319 MG/DL — HIGH (ref 70–99)
GLUCOSE SERPL-MCNC: 335 MG/DL — HIGH (ref 70–99)
GLUCOSE SERPL-MCNC: 37 MG/DL — CRITICAL LOW (ref 70–99)
GLUCOSE SERPL-MCNC: 37 MG/DL — CRITICAL LOW (ref 70–99)
GLUCOSE SERPL-MCNC: 382 MG/DL — HIGH (ref 70–99)
GLUCOSE SERPL-MCNC: 395 MG/DL — HIGH (ref 70–99)
GLUCOSE SERPL-MCNC: 41 MG/DL — CRITICAL LOW (ref 70–99)
GLUCOSE SERPL-MCNC: 462 MG/DL — CRITICAL HIGH (ref 70–99)
GLUCOSE SERPL-MCNC: 47 MG/DL — CRITICAL LOW (ref 70–99)
GLUCOSE SERPL-MCNC: 54 MG/DL — CRITICAL LOW (ref 70–99)
GLUCOSE SERPL-MCNC: 70 MG/DL — SIGNIFICANT CHANGE UP (ref 70–99)
GLUCOSE SERPL-MCNC: 75 MG/DL — SIGNIFICANT CHANGE UP (ref 70–99)
GLUCOSE SERPL-MCNC: 83 MG/DL — SIGNIFICANT CHANGE UP (ref 70–99)
GLUCOSE SERPL-MCNC: 84 MG/DL — SIGNIFICANT CHANGE UP (ref 70–99)
GLUCOSE SERPL-MCNC: 85 MG/DL — SIGNIFICANT CHANGE UP (ref 70–99)
GLUCOSE SERPL-MCNC: 87 MG/DL — SIGNIFICANT CHANGE UP (ref 70–99)
GLUCOSE SERPL-MCNC: 90 MG/DL — SIGNIFICANT CHANGE UP (ref 70–99)
GLUCOSE SERPL-MCNC: 91 MG/DL — SIGNIFICANT CHANGE UP (ref 70–99)
GLUCOSE SERPL-MCNC: 97 MG/DL — SIGNIFICANT CHANGE UP (ref 70–99)
GLUCOSE SERPL-MCNC: 99 MG/DL — SIGNIFICANT CHANGE UP (ref 70–99)
GLUCOSE UR QL: NEGATIVE MG/DL — SIGNIFICANT CHANGE UP
GRAM STN FLD: ABNORMAL
HAPTOGLOB SERPL-MCNC: <20 MG/DL — LOW (ref 34–200)
HBA1C MFR BLD HPLC: 6.9 %
HBA1C MFR BLD HPLC: 7
HBV CORE AB SER-ACNC: SIGNIFICANT CHANGE UP
HBV SURFACE AB SER-ACNC: <3 MIU/ML — LOW
HBV SURFACE AB SER-ACNC: SIGNIFICANT CHANGE UP
HBV SURFACE AG SER-ACNC: SIGNIFICANT CHANGE UP
HCO3 BLDA-SCNC: 13 MMOL/L — LOW (ref 21–28)
HCO3 BLDA-SCNC: 14 MMOL/L — LOW (ref 21–28)
HCO3 BLDA-SCNC: 14 MMOL/L — LOW (ref 21–28)
HCO3 BLDA-SCNC: 22 MMOL/L — SIGNIFICANT CHANGE UP (ref 21–28)
HCO3 BLDA-SCNC: 24 MMOL/L — SIGNIFICANT CHANGE UP (ref 21–28)
HCO3 BLDA-SCNC: 24 MMOL/L — SIGNIFICANT CHANGE UP (ref 21–28)
HCO3 BLDA-SCNC: 25 MMOL/L — SIGNIFICANT CHANGE UP (ref 21–28)
HCO3 BLDA-SCNC: 26 MMOL/L — SIGNIFICANT CHANGE UP (ref 21–28)
HCO3 BLDMV-SCNC: 25 MMOL/L — SIGNIFICANT CHANGE UP
HCO3 BLDV-SCNC: 25 MMOL/L — SIGNIFICANT CHANGE UP (ref 22–29)
HCO3 BLDV-SCNC: 32 MMOL/L — HIGH (ref 22–29)
HCO3 BLDV-SCNC: 35 MMOL/L — HIGH (ref 22–29)
HCO3 BLDV-SCNC: 36 MMOL/L — HIGH (ref 22–29)
HCO3 BLDV-SCNC: 38 MMOL/L — HIGH (ref 22–29)
HCO3 BLDV-SCNC: 38 MMOL/L — HIGH (ref 22–29)
HCT VFR BLD CALC: 24.1 % — LOW (ref 34.5–45)
HCT VFR BLD CALC: 24.1 % — LOW (ref 34.5–45)
HCT VFR BLD CALC: 24.2 % — LOW (ref 34.5–45)
HCT VFR BLD CALC: 24.5 % — LOW (ref 34.5–45)
HCT VFR BLD CALC: 25.3 % — LOW (ref 34.5–45)
HCT VFR BLD CALC: 25.3 % — LOW (ref 34.5–45)
HCT VFR BLD CALC: 25.5 % — LOW (ref 34.5–45)
HCT VFR BLD CALC: 25.7 % — LOW (ref 34.5–45)
HCT VFR BLD CALC: 25.9 % — LOW (ref 34.5–45)
HCT VFR BLD CALC: 25.9 % — LOW (ref 34.5–45)
HCT VFR BLD CALC: 26 % — LOW (ref 34.5–45)
HCT VFR BLD CALC: 26.1 % — LOW (ref 34.5–45)
HCT VFR BLD CALC: 26.3 % — LOW (ref 34.5–45)
HCT VFR BLD CALC: 26.4 % — LOW (ref 34.5–45)
HCT VFR BLD CALC: 26.4 % — LOW (ref 34.5–45)
HCT VFR BLD CALC: 26.7 % — LOW (ref 34.5–45)
HCT VFR BLD CALC: 26.9 % — LOW (ref 34.5–45)
HCT VFR BLD CALC: 27.4 % — LOW (ref 34.5–45)
HCT VFR BLD CALC: 28.6 % — LOW (ref 34.5–45)
HCT VFR BLD CALC: 28.8 % — LOW (ref 34.5–45)
HCT VFR BLD CALC: 28.8 % — LOW (ref 34.5–45)
HCT VFR BLD CALC: 29.4 % — LOW (ref 34.5–45)
HCT VFR BLD CALC: 29.5 % — LOW (ref 34.5–45)
HCT VFR BLD CALC: 29.8 % — LOW (ref 34.5–45)
HCT VFR BLD CALC: 30 % — LOW (ref 34.5–45)
HCT VFR BLD CALC: 30.1 % — LOW (ref 34.5–45)
HCT VFR BLD CALC: 30.5 % — LOW (ref 34.5–45)
HCT VFR BLD CALC: 30.7 % — LOW (ref 34.5–45)
HCT VFR BLD CALC: 30.8 % — LOW (ref 34.5–45)
HCT VFR BLD CALC: 31.1 % — LOW (ref 34.5–45)
HCT VFR BLD CALC: 31.2 % — LOW (ref 34.5–45)
HCT VFR BLD CALC: 31.5 % — LOW (ref 34.5–45)
HCT VFR BLD CALC: 31.5 % — LOW (ref 34.5–45)
HCT VFR BLD CALC: 31.7 % — LOW (ref 34.5–45)
HCT VFR BLD CALC: 31.8 % — LOW (ref 34.5–45)
HCT VFR BLD CALC: 32.1 % — LOW (ref 34.5–45)
HCT VFR BLD CALC: 32.3 % — LOW (ref 34.5–45)
HCT VFR BLD CALC: 32.4 % — LOW (ref 34.5–45)
HCT VFR BLD CALC: 33.1 % — LOW (ref 34.5–45)
HCT VFR BLD CALC: 33.3 % — LOW (ref 34.5–45)
HCT VFR BLD CALC: 33.3 % — LOW (ref 34.5–45)
HCT VFR BLD CALC: 33.5 % — LOW (ref 34.5–45)
HCT VFR BLD CALC: 33.5 % — LOW (ref 34.5–45)
HCT VFR BLD CALC: 33.6 % — LOW (ref 34.5–45)
HCT VFR BLD CALC: 33.6 % — LOW (ref 34.5–45)
HCT VFR BLD CALC: 33.7 % — LOW (ref 34.5–45)
HCT VFR BLD CALC: 33.7 % — LOW (ref 34.5–45)
HCT VFR BLD CALC: 33.9 % — LOW (ref 34.5–45)
HCT VFR BLD CALC: 33.9 % — LOW (ref 34.5–45)
HCT VFR BLD CALC: 34 % — LOW (ref 34.5–45)
HCT VFR BLD CALC: 34.1 % — LOW (ref 34.5–45)
HCT VFR BLD CALC: 34.2 % — LOW (ref 34.5–45)
HCT VFR BLD CALC: 34.3 % — LOW (ref 34.5–45)
HCT VFR BLD CALC: 34.4 % — LOW (ref 34.5–45)
HCT VFR BLD CALC: 34.6 % — SIGNIFICANT CHANGE UP (ref 34.5–45)
HCT VFR BLD CALC: 34.6 % — SIGNIFICANT CHANGE UP (ref 34.5–45)
HCT VFR BLD CALC: 35.4 % — SIGNIFICANT CHANGE UP (ref 34.5–45)
HCT VFR BLD CALC: 35.6 % — SIGNIFICANT CHANGE UP (ref 34.5–45)
HCT VFR BLD CALC: 35.9 % — SIGNIFICANT CHANGE UP (ref 34.5–45)
HCT VFR BLD CALC: 36.1 % — SIGNIFICANT CHANGE UP (ref 34.5–45)
HCT VFR BLD CALC: 36.2 %
HCT VFR BLD CALC: 36.4 % — SIGNIFICANT CHANGE UP (ref 34.5–45)
HCT VFR BLD CALC: 36.6 % — SIGNIFICANT CHANGE UP (ref 34.5–45)
HCT VFR BLD CALC: 36.7 % — SIGNIFICANT CHANGE UP (ref 34.5–45)
HCT VFR BLD CALC: 36.9 % — SIGNIFICANT CHANGE UP (ref 34.5–45)
HCT VFR BLD CALC: 37 % — SIGNIFICANT CHANGE UP (ref 34.5–45)
HCT VFR BLD CALC: 37.1 % — SIGNIFICANT CHANGE UP (ref 34.5–45)
HCT VFR BLD CALC: 37.1 % — SIGNIFICANT CHANGE UP (ref 34.5–45)
HCT VFR BLD CALC: 37.2 % — SIGNIFICANT CHANGE UP (ref 34.5–45)
HCT VFR BLD CALC: 37.2 % — SIGNIFICANT CHANGE UP (ref 34.5–45)
HCT VFR BLD CALC: 37.3 % — SIGNIFICANT CHANGE UP (ref 34.5–45)
HCT VFR BLD CALC: 37.4 % — SIGNIFICANT CHANGE UP (ref 34.5–45)
HCT VFR BLD CALC: 37.4 % — SIGNIFICANT CHANGE UP (ref 34.5–45)
HCT VFR BLD CALC: 37.5 % — SIGNIFICANT CHANGE UP (ref 34.5–45)
HCT VFR BLD CALC: 37.5 % — SIGNIFICANT CHANGE UP (ref 34.5–45)
HCT VFR BLD CALC: 37.9 % — SIGNIFICANT CHANGE UP (ref 34.5–45)
HCT VFR BLD CALC: 38 % — SIGNIFICANT CHANGE UP (ref 34.5–45)
HCT VFR BLD CALC: 38.2 % — SIGNIFICANT CHANGE UP (ref 34.5–45)
HCT VFR BLD CALC: 38.2 % — SIGNIFICANT CHANGE UP (ref 34.5–45)
HCT VFR BLD CALC: 38.4 % — SIGNIFICANT CHANGE UP (ref 34.5–45)
HCT VFR BLD CALC: 38.4 % — SIGNIFICANT CHANGE UP (ref 34.5–45)
HCT VFR BLD CALC: 38.5 % — SIGNIFICANT CHANGE UP (ref 34.5–45)
HCT VFR BLD CALC: 39.6 % — SIGNIFICANT CHANGE UP (ref 34.5–45)
HCT VFR BLD CALC: 39.8 % — SIGNIFICANT CHANGE UP (ref 34.5–45)
HCT VFR BLD CALC: 40.1 % — SIGNIFICANT CHANGE UP (ref 34.5–45)
HCT VFR BLD CALC: 40.1 % — SIGNIFICANT CHANGE UP (ref 34.5–45)
HCT VFR BLD CALC: 40.4 % — SIGNIFICANT CHANGE UP (ref 34.5–45)
HCT VFR BLD CALC: 42.6 % — SIGNIFICANT CHANGE UP (ref 34.5–45)
HCT VFR BLDA CALC: 35 % — SIGNIFICANT CHANGE UP (ref 34.5–46.5)
HCT VFR BLDA CALC: 40 % — SIGNIFICANT CHANGE UP (ref 34.5–46.5)
HCV AB S/CO SERPL IA: 0.12 S/CO — SIGNIFICANT CHANGE UP (ref 0–0.99)
HCV AB SERPL-IMP: SIGNIFICANT CHANGE UP
HDLC SERPL-MCNC: 33 MG/DL — LOW
HEPARIN-PF4 AB RESULT: <0.6 U/ML — SIGNIFICANT CHANGE UP (ref 0–0.9)
HGB BLD CALC-MCNC: 11.6 G/DL — LOW (ref 11.7–16.1)
HGB BLD CALC-MCNC: 11.7 G/DL — SIGNIFICANT CHANGE UP (ref 11.7–16.1)
HGB BLD CALC-MCNC: 11.8 G/DL — SIGNIFICANT CHANGE UP (ref 11.7–16.1)
HGB BLD CALC-MCNC: 13.4 G/DL — SIGNIFICANT CHANGE UP (ref 11.7–16.1)
HGB BLD-MCNC: 10 G/DL — LOW (ref 11.5–15.5)
HGB BLD-MCNC: 10.1 G/DL — LOW (ref 11.5–15.5)
HGB BLD-MCNC: 10.1 G/DL — LOW (ref 11.5–15.5)
HGB BLD-MCNC: 10.2 G/DL — LOW (ref 11.5–15.5)
HGB BLD-MCNC: 10.3 G/DL — LOW (ref 11.5–15.5)
HGB BLD-MCNC: 10.4 G/DL — LOW (ref 11.5–15.5)
HGB BLD-MCNC: 10.5 G/DL — LOW (ref 11.5–15.5)
HGB BLD-MCNC: 10.7 G/DL — LOW (ref 11.5–15.5)
HGB BLD-MCNC: 10.7 G/DL — LOW (ref 11.5–15.5)
HGB BLD-MCNC: 10.9 G/DL — LOW (ref 11.5–15.5)
HGB BLD-MCNC: 11 G/DL — LOW (ref 11.5–15.5)
HGB BLD-MCNC: 11 G/DL — LOW (ref 11.5–15.5)
HGB BLD-MCNC: 11.2 G/DL — LOW (ref 11.5–15.5)
HGB BLD-MCNC: 11.2 G/DL — LOW (ref 11.5–15.5)
HGB BLD-MCNC: 11.3 G/DL — LOW (ref 11.5–15.5)
HGB BLD-MCNC: 11.4 G/DL
HGB BLD-MCNC: 11.4 G/DL — LOW (ref 11.5–15.5)
HGB BLD-MCNC: 11.4 G/DL — LOW (ref 11.5–15.5)
HGB BLD-MCNC: 11.5 G/DL — SIGNIFICANT CHANGE UP (ref 11.5–15.5)
HGB BLD-MCNC: 11.6 G/DL — SIGNIFICANT CHANGE UP (ref 11.5–15.5)
HGB BLD-MCNC: 11.6 G/DL — SIGNIFICANT CHANGE UP (ref 11.5–15.5)
HGB BLD-MCNC: 11.7 G/DL — SIGNIFICANT CHANGE UP (ref 11.5–15.5)
HGB BLD-MCNC: 11.8 G/DL — SIGNIFICANT CHANGE UP (ref 11.5–15.5)
HGB BLD-MCNC: 11.9 G/DL — SIGNIFICANT CHANGE UP (ref 11.5–15.5)
HGB BLD-MCNC: 12 G/DL — SIGNIFICANT CHANGE UP (ref 11.5–15.5)
HGB BLD-MCNC: 12.6 G/DL — SIGNIFICANT CHANGE UP (ref 11.5–15.5)
HGB BLD-MCNC: 7.3 G/DL — LOW (ref 11.5–15.5)
HGB BLD-MCNC: 7.3 G/DL — LOW (ref 11.5–15.5)
HGB BLD-MCNC: 7.4 G/DL — LOW (ref 11.5–15.5)
HGB BLD-MCNC: 7.5 G/DL — LOW (ref 11.5–15.5)
HGB BLD-MCNC: 7.8 G/DL — LOW (ref 11.5–15.5)
HGB BLD-MCNC: 7.9 G/DL — LOW (ref 11.5–15.5)
HGB BLD-MCNC: 8 G/DL — LOW (ref 11.5–15.5)
HGB BLD-MCNC: 8.1 G/DL — LOW (ref 11.5–15.5)
HGB BLD-MCNC: 8.2 G/DL — LOW (ref 11.5–15.5)
HGB BLD-MCNC: 8.2 G/DL — LOW (ref 11.5–15.5)
HGB BLD-MCNC: 8.3 G/DL — LOW (ref 11.5–15.5)
HGB BLD-MCNC: 8.3 G/DL — LOW (ref 11.5–15.5)
HGB BLD-MCNC: 8.5 G/DL — LOW (ref 11.5–15.5)
HGB BLD-MCNC: 8.6 G/DL — LOW (ref 11.5–15.5)
HGB BLD-MCNC: 8.7 G/DL — LOW (ref 11.5–15.5)
HGB BLD-MCNC: 8.9 G/DL — LOW (ref 11.5–15.5)
HGB BLD-MCNC: 8.9 G/DL — LOW (ref 11.5–15.5)
HGB BLD-MCNC: 9 G/DL — LOW (ref 11.5–15.5)
HGB BLD-MCNC: 9.1 G/DL — LOW (ref 11.5–15.5)
HGB BLD-MCNC: 9.2 G/DL — LOW (ref 11.5–15.5)
HGB BLD-MCNC: 9.3 G/DL — LOW (ref 11.5–15.5)
HGB BLD-MCNC: 9.4 G/DL — LOW (ref 11.5–15.5)
HGB BLD-MCNC: 9.6 G/DL — LOW (ref 11.5–15.5)
HGB BLD-MCNC: 9.7 G/DL — LOW (ref 11.5–15.5)
HGB BLD-MCNC: 9.7 G/DL — LOW (ref 11.5–15.5)
HGB BLD-MCNC: 9.9 G/DL — LOW (ref 11.5–15.5)
HGB FLD-MCNC: SIGNIFICANT CHANGE UP
HIV 1+2 AB+HIV1 P24 AG SERPL QL IA: SIGNIFICANT CHANGE UP
HOROWITZ INDEX BLDA+IHG-RTO: 100 — SIGNIFICANT CHANGE UP
HOROWITZ INDEX BLDA+IHG-RTO: 30 — SIGNIFICANT CHANGE UP
HOROWITZ INDEX BLDA+IHG-RTO: 40 — SIGNIFICANT CHANGE UP
HYALINE CASTS # UR AUTO: PRESENT
HYALINE CASTS # UR AUTO: PRESENT
HYPOCHROMIA BLD QL: SIGNIFICANT CHANGE UP
IMM GRANULOCYTES NFR BLD AUTO: 0.2 % — SIGNIFICANT CHANGE UP (ref 0–0.9)
IMM GRANULOCYTES NFR BLD AUTO: 0.4 % — SIGNIFICANT CHANGE UP (ref 0–0.9)
IMM GRANULOCYTES NFR BLD AUTO: 0.4 % — SIGNIFICANT CHANGE UP (ref 0–0.9)
IMM GRANULOCYTES NFR BLD AUTO: 0.5 % — SIGNIFICANT CHANGE UP (ref 0–0.9)
IMM GRANULOCYTES NFR BLD AUTO: 0.6 % — SIGNIFICANT CHANGE UP (ref 0–0.9)
IMM GRANULOCYTES NFR BLD AUTO: 0.7 % — SIGNIFICANT CHANGE UP (ref 0–0.9)
IMM GRANULOCYTES NFR BLD AUTO: 0.8 %
IMM GRANULOCYTES NFR BLD AUTO: 0.8 % — SIGNIFICANT CHANGE UP (ref 0–0.9)
IMM GRANULOCYTES NFR BLD AUTO: 0.9 % — SIGNIFICANT CHANGE UP (ref 0–0.9)
IMM GRANULOCYTES NFR BLD AUTO: 1 % — HIGH (ref 0–0.9)
IMM GRANULOCYTES NFR BLD AUTO: 1.1 % — HIGH (ref 0–0.9)
IMM GRANULOCYTES NFR BLD AUTO: 1.1 % — HIGH (ref 0–0.9)
IMM GRANULOCYTES NFR BLD AUTO: 1.2 % — HIGH (ref 0–0.9)
IMM GRANULOCYTES NFR BLD AUTO: 1.3 % — HIGH (ref 0–0.9)
IMM GRANULOCYTES NFR BLD AUTO: 1.4 % — HIGH (ref 0–0.9)
IMM GRANULOCYTES NFR BLD AUTO: 1.6 % — HIGH (ref 0–0.9)
IMM GRANULOCYTES NFR BLD AUTO: 1.7 % — HIGH (ref 0–0.9)
IMM GRANULOCYTES NFR BLD AUTO: 1.8 % — HIGH (ref 0–0.9)
IMM GRANULOCYTES NFR BLD AUTO: 2.4 % — HIGH (ref 0–0.9)
IMM GRANULOCYTES NFR BLD AUTO: 3.4 % — HIGH (ref 0–0.9)
IMM GRANULOCYTES NFR BLD AUTO: 4.6 % — HIGH (ref 0–0.9)
IMM GRANULOCYTES NFR BLD AUTO: 5 % — HIGH (ref 0–0.9)
IMM GRANULOCYTES NFR BLD AUTO: 7.4 % — HIGH (ref 0–0.9)
INR BLD: 0.96 RATIO — SIGNIFICANT CHANGE UP (ref 0.85–1.18)
INR BLD: 1.06 RATIO — SIGNIFICANT CHANGE UP (ref 0.85–1.18)
INR BLD: 1.06 RATIO — SIGNIFICANT CHANGE UP (ref 0.85–1.18)
INR BLD: 1.07 RATIO — SIGNIFICANT CHANGE UP (ref 0.85–1.18)
INR BLD: 1.07 RATIO — SIGNIFICANT CHANGE UP (ref 0.85–1.18)
INR BLD: 1.11 RATIO — SIGNIFICANT CHANGE UP (ref 0.85–1.18)
INR BLD: 1.13 RATIO — SIGNIFICANT CHANGE UP (ref 0.85–1.18)
INR BLD: 1.13 RATIO — SIGNIFICANT CHANGE UP (ref 0.85–1.18)
INR BLD: 1.15 RATIO — SIGNIFICANT CHANGE UP (ref 0.85–1.18)
INR BLD: 1.19 RATIO — HIGH (ref 0.85–1.18)
INR BLD: 1.19 RATIO — HIGH (ref 0.85–1.18)
INR BLD: 1.33 RATIO — HIGH (ref 0.85–1.18)
INR BLD: 1.43 RATIO — HIGH (ref 0.85–1.18)
INR BLD: 1.52 RATIO — HIGH (ref 0.85–1.18)
INR BLD: 1.64 RATIO — HIGH (ref 0.85–1.18)
INR BLD: 1.98 RATIO — HIGH (ref 0.85–1.18)
INR BLD: 2.42 RATIO — HIGH (ref 0.85–1.18)
INR BLD: 2.49 RATIO — HIGH (ref 0.85–1.18)
INR BLD: 2.6 RATIO — HIGH (ref 0.85–1.18)
KETONES UR-MCNC: ABNORMAL MG/DL
KETONES UR-MCNC: NEGATIVE MG/DL — SIGNIFICANT CHANGE UP
LACTATE BLDV-MCNC: 1.9 MMOL/L — SIGNIFICANT CHANGE UP (ref 0.5–2)
LACTATE BLDV-MCNC: 2.4 MMOL/L — HIGH (ref 0.5–2)
LACTATE BLDV-MCNC: 2.7 MMOL/L — HIGH (ref 0.5–2)
LACTATE BLDV-MCNC: 3.8 MMOL/L — HIGH (ref 0.5–2)
LACTATE SERPL-SCNC: 1.6 MMOL/L — SIGNIFICANT CHANGE UP (ref 0.7–2)
LACTATE SERPL-SCNC: 1.6 MMOL/L — SIGNIFICANT CHANGE UP (ref 0.7–2)
LACTATE SERPL-SCNC: 2.1 MMOL/L — HIGH (ref 0.7–2)
LACTATE SERPL-SCNC: 2.5 MMOL/L — HIGH (ref 0.7–2)
LACTATE SERPL-SCNC: 2.5 MMOL/L — HIGH (ref 0.7–2)
LACTATE SERPL-SCNC: 3.3 MMOL/L — HIGH (ref 0.7–2)
LACTATE SERPL-SCNC: 3.9 MMOL/L — HIGH (ref 0.7–2)
LACTATE SERPL-SCNC: 5.6 MMOL/L — CRITICAL HIGH (ref 0.7–2)
LACTATE SERPL-SCNC: 6.6 MMOL/L — CRITICAL HIGH (ref 0.7–2)
LACTATE SERPL-SCNC: 7.6 MMOL/L — CRITICAL HIGH (ref 0.7–2)
LACTATE SERPL-SCNC: 8.2 MMOL/L — CRITICAL HIGH (ref 0.7–2)
LACTATE SERPL-SCNC: 9.2 MMOL/L — CRITICAL HIGH (ref 0.7–2)
LDH SERPL L TO P-CCNC: 394 U/L — HIGH (ref 50–242)
LEUKOCYTE ESTERASE UR-ACNC: ABNORMAL
LIDOCAIN IGE QN: 23 U/L — SIGNIFICANT CHANGE UP (ref 13–75)
LIDOCAIN IGE QN: 23 U/L — SIGNIFICANT CHANGE UP (ref 13–75)
LIDOCAIN IGE QN: 5 U/L — LOW (ref 7–60)
LIPID PNL WITH DIRECT LDL SERPL: 25 MG/DL — SIGNIFICANT CHANGE UP
LYMPHOCYTES # BLD AUTO: 0 % — LOW (ref 13–44)
LYMPHOCYTES # BLD AUTO: 0 K/UL — LOW (ref 1–3.3)
LYMPHOCYTES # BLD AUTO: 0.08 K/UL — LOW (ref 1–3.3)
LYMPHOCYTES # BLD AUTO: 0.08 K/UL — LOW (ref 1–3.3)
LYMPHOCYTES # BLD AUTO: 0.15 K/UL — LOW (ref 1–3.3)
LYMPHOCYTES # BLD AUTO: 0.23 K/UL — LOW (ref 1–3.3)
LYMPHOCYTES # BLD AUTO: 0.24 K/UL — LOW (ref 1–3.3)
LYMPHOCYTES # BLD AUTO: 0.3 K/UL — LOW (ref 1–3.3)
LYMPHOCYTES # BLD AUTO: 0.3 K/UL — LOW (ref 1–3.3)
LYMPHOCYTES # BLD AUTO: 0.38 K/UL — LOW (ref 1–3.3)
LYMPHOCYTES # BLD AUTO: 0.47 K/UL — LOW (ref 1–3.3)
LYMPHOCYTES # BLD AUTO: 0.47 K/UL — LOW (ref 1–3.3)
LYMPHOCYTES # BLD AUTO: 0.48 K/UL — LOW (ref 1–3.3)
LYMPHOCYTES # BLD AUTO: 0.48 K/UL — LOW (ref 1–3.3)
LYMPHOCYTES # BLD AUTO: 0.49 K/UL — LOW (ref 1–3.3)
LYMPHOCYTES # BLD AUTO: 0.5 % — LOW (ref 13–44)
LYMPHOCYTES # BLD AUTO: 0.52 K/UL — LOW (ref 1–3.3)
LYMPHOCYTES # BLD AUTO: 0.52 K/UL — LOW (ref 1–3.3)
LYMPHOCYTES # BLD AUTO: 0.53 K/UL — LOW (ref 1–3.3)
LYMPHOCYTES # BLD AUTO: 0.53 K/UL — LOW (ref 1–3.3)
LYMPHOCYTES # BLD AUTO: 0.56 K/UL — LOW (ref 1–3.3)
LYMPHOCYTES # BLD AUTO: 0.58 K/UL — LOW (ref 1–3.3)
LYMPHOCYTES # BLD AUTO: 0.63 K/UL — LOW (ref 1–3.3)
LYMPHOCYTES # BLD AUTO: 0.65 K/UL — LOW (ref 1–3.3)
LYMPHOCYTES # BLD AUTO: 0.66 K/UL — LOW (ref 1–3.3)
LYMPHOCYTES # BLD AUTO: 0.66 K/UL — LOW (ref 1–3.3)
LYMPHOCYTES # BLD AUTO: 0.68 K/UL — LOW (ref 1–3.3)
LYMPHOCYTES # BLD AUTO: 0.69 K/UL — LOW (ref 1–3.3)
LYMPHOCYTES # BLD AUTO: 0.7 K/UL — LOW (ref 1–3.3)
LYMPHOCYTES # BLD AUTO: 0.7 K/UL — LOW (ref 1–3.3)
LYMPHOCYTES # BLD AUTO: 0.71 K/UL — LOW (ref 1–3.3)
LYMPHOCYTES # BLD AUTO: 0.72 K/UL — LOW (ref 1–3.3)
LYMPHOCYTES # BLD AUTO: 0.73 K/UL
LYMPHOCYTES # BLD AUTO: 0.74 K/UL — LOW (ref 1–3.3)
LYMPHOCYTES # BLD AUTO: 0.75 K/UL — LOW (ref 1–3.3)
LYMPHOCYTES # BLD AUTO: 0.75 K/UL — LOW (ref 1–3.3)
LYMPHOCYTES # BLD AUTO: 0.76 K/UL — LOW (ref 1–3.3)
LYMPHOCYTES # BLD AUTO: 0.76 K/UL — LOW (ref 1–3.3)
LYMPHOCYTES # BLD AUTO: 0.77 K/UL — LOW (ref 1–3.3)
LYMPHOCYTES # BLD AUTO: 0.78 K/UL — LOW (ref 1–3.3)
LYMPHOCYTES # BLD AUTO: 0.78 K/UL — LOW (ref 1–3.3)
LYMPHOCYTES # BLD AUTO: 0.8 K/UL — LOW (ref 1–3.3)
LYMPHOCYTES # BLD AUTO: 0.84 K/UL — LOW (ref 1–3.3)
LYMPHOCYTES # BLD AUTO: 0.85 K/UL — LOW (ref 1–3.3)
LYMPHOCYTES # BLD AUTO: 0.85 K/UL — LOW (ref 1–3.3)
LYMPHOCYTES # BLD AUTO: 0.9 % — LOW (ref 13–44)
LYMPHOCYTES # BLD AUTO: 0.9 K/UL — LOW (ref 1–3.3)
LYMPHOCYTES # BLD AUTO: 0.9 K/UL — LOW (ref 1–3.3)
LYMPHOCYTES # BLD AUTO: 0.94 K/UL — LOW (ref 1–3.3)
LYMPHOCYTES # BLD AUTO: 0.94 K/UL — LOW (ref 1–3.3)
LYMPHOCYTES # BLD AUTO: 0.96 K/UL — LOW (ref 1–3.3)
LYMPHOCYTES # BLD AUTO: 0.98 K/UL — LOW (ref 1–3.3)
LYMPHOCYTES # BLD AUTO: 0.98 K/UL — LOW (ref 1–3.3)
LYMPHOCYTES # BLD AUTO: 1 K/UL — SIGNIFICANT CHANGE UP (ref 1–3.3)
LYMPHOCYTES # BLD AUTO: 1.04 K/UL — SIGNIFICANT CHANGE UP (ref 1–3.3)
LYMPHOCYTES # BLD AUTO: 1.09 K/UL — SIGNIFICANT CHANGE UP (ref 1–3.3)
LYMPHOCYTES # BLD AUTO: 1.11 K/UL — SIGNIFICANT CHANGE UP (ref 1–3.3)
LYMPHOCYTES # BLD AUTO: 1.13 K/UL — SIGNIFICANT CHANGE UP (ref 1–3.3)
LYMPHOCYTES # BLD AUTO: 1.19 K/UL — SIGNIFICANT CHANGE UP (ref 1–3.3)
LYMPHOCYTES # BLD AUTO: 1.2 % — LOW (ref 13–44)
LYMPHOCYTES # BLD AUTO: 1.21 K/UL — SIGNIFICANT CHANGE UP (ref 1–3.3)
LYMPHOCYTES # BLD AUTO: 1.26 K/UL — SIGNIFICANT CHANGE UP (ref 1–3.3)
LYMPHOCYTES # BLD AUTO: 1.37 K/UL — SIGNIFICANT CHANGE UP (ref 1–3.3)
LYMPHOCYTES # BLD AUTO: 10.3 % — LOW (ref 13–44)
LYMPHOCYTES # BLD AUTO: 10.4 % — LOW (ref 13–44)
LYMPHOCYTES # BLD AUTO: 10.5 % — LOW (ref 13–44)
LYMPHOCYTES # BLD AUTO: 10.7 % — LOW (ref 13–44)
LYMPHOCYTES # BLD AUTO: 11 % — LOW (ref 13–44)
LYMPHOCYTES # BLD AUTO: 11.1 % — LOW (ref 13–44)
LYMPHOCYTES # BLD AUTO: 11.2 % — LOW (ref 13–44)
LYMPHOCYTES # BLD AUTO: 11.3 % — LOW (ref 13–44)
LYMPHOCYTES # BLD AUTO: 11.7 % — LOW (ref 13–44)
LYMPHOCYTES # BLD AUTO: 11.8 % — LOW (ref 13–44)
LYMPHOCYTES # BLD AUTO: 12 % — LOW (ref 13–44)
LYMPHOCYTES # BLD AUTO: 12 % — LOW (ref 13–44)
LYMPHOCYTES # BLD AUTO: 12.1 % — LOW (ref 13–44)
LYMPHOCYTES # BLD AUTO: 12.2 % — LOW (ref 13–44)
LYMPHOCYTES # BLD AUTO: 12.3 % — LOW (ref 13–44)
LYMPHOCYTES # BLD AUTO: 12.4 % — LOW (ref 13–44)
LYMPHOCYTES # BLD AUTO: 12.5 % — LOW (ref 13–44)
LYMPHOCYTES # BLD AUTO: 13.2 % — SIGNIFICANT CHANGE UP (ref 13–44)
LYMPHOCYTES # BLD AUTO: 13.5 % — SIGNIFICANT CHANGE UP (ref 13–44)
LYMPHOCYTES # BLD AUTO: 13.6 % — SIGNIFICANT CHANGE UP (ref 13–44)
LYMPHOCYTES # BLD AUTO: 13.9 % — SIGNIFICANT CHANGE UP (ref 13–44)
LYMPHOCYTES # BLD AUTO: 14.1 % — SIGNIFICANT CHANGE UP (ref 13–44)
LYMPHOCYTES # BLD AUTO: 14.7 % — SIGNIFICANT CHANGE UP (ref 13–44)
LYMPHOCYTES # BLD AUTO: 14.9 % — SIGNIFICANT CHANGE UP (ref 13–44)
LYMPHOCYTES # BLD AUTO: 2.5 % — LOW (ref 13–44)
LYMPHOCYTES # BLD AUTO: 2.7 % — LOW (ref 13–44)
LYMPHOCYTES # BLD AUTO: 3 % — LOW (ref 13–44)
LYMPHOCYTES # BLD AUTO: 3 % — LOW (ref 13–44)
LYMPHOCYTES # BLD AUTO: 3.3 % — LOW (ref 13–44)
LYMPHOCYTES # BLD AUTO: 3.3 % — LOW (ref 13–44)
LYMPHOCYTES # BLD AUTO: 3.8 % — LOW (ref 13–44)
LYMPHOCYTES # BLD AUTO: 4 % — LOW (ref 13–44)
LYMPHOCYTES # BLD AUTO: 4.3 % — LOW (ref 13–44)
LYMPHOCYTES # BLD AUTO: 4.4 % — LOW (ref 13–44)
LYMPHOCYTES # BLD AUTO: 4.4 % — LOW (ref 13–44)
LYMPHOCYTES # BLD AUTO: 4.5 % — LOW (ref 13–44)
LYMPHOCYTES # BLD AUTO: 4.8 % — LOW (ref 13–44)
LYMPHOCYTES # BLD AUTO: 4.8 % — LOW (ref 13–44)
LYMPHOCYTES # BLD AUTO: 5 % — LOW (ref 13–44)
LYMPHOCYTES # BLD AUTO: 5.4 % — LOW (ref 13–44)
LYMPHOCYTES # BLD AUTO: 5.5 % — LOW (ref 13–44)
LYMPHOCYTES # BLD AUTO: 5.6 % — LOW (ref 13–44)
LYMPHOCYTES # BLD AUTO: 5.7 % — LOW (ref 13–44)
LYMPHOCYTES # BLD AUTO: 5.8 % — LOW (ref 13–44)
LYMPHOCYTES # BLD AUTO: 6 % — LOW (ref 13–44)
LYMPHOCYTES # BLD AUTO: 6.3 % — LOW (ref 13–44)
LYMPHOCYTES # BLD AUTO: 6.3 % — LOW (ref 13–44)
LYMPHOCYTES # BLD AUTO: 6.7 % — LOW (ref 13–44)
LYMPHOCYTES # BLD AUTO: 6.8 % — LOW (ref 13–44)
LYMPHOCYTES # BLD AUTO: 6.8 % — LOW (ref 13–44)
LYMPHOCYTES # BLD AUTO: 7.2 % — LOW (ref 13–44)
LYMPHOCYTES # BLD AUTO: 7.5 % — LOW (ref 13–44)
LYMPHOCYTES # BLD AUTO: 7.7 % — LOW (ref 13–44)
LYMPHOCYTES # BLD AUTO: 7.9 % — LOW (ref 13–44)
LYMPHOCYTES # BLD AUTO: 7.9 % — LOW (ref 13–44)
LYMPHOCYTES # BLD AUTO: 8.1 % — LOW (ref 13–44)
LYMPHOCYTES # BLD AUTO: 8.4 % — LOW (ref 13–44)
LYMPHOCYTES # BLD AUTO: 8.8 % — LOW (ref 13–44)
LYMPHOCYTES NFR BLD AUTO: 14.1 %
M TB IFN-G BLD-IMP: ABNORMAL
M TB IFN-G CD4+ BCKGRND COR BLD-ACNC: 0 IU/ML — SIGNIFICANT CHANGE UP
M TB IFN-G CD4+CD8+ BCKGRND COR BLD-ACNC: 0 IU/ML — SIGNIFICANT CHANGE UP
MACROCYTES BLD QL: SLIGHT — SIGNIFICANT CHANGE UP
MACROCYTES BLD QL: SLIGHT — SIGNIFICANT CHANGE UP
MAGNESIUM SERPL-MCNC: 1.7 MG/DL — SIGNIFICANT CHANGE UP (ref 1.6–2.6)
MAGNESIUM SERPL-MCNC: 1.8 MG/DL — SIGNIFICANT CHANGE UP (ref 1.6–2.6)
MAGNESIUM SERPL-MCNC: 1.9 MG/DL — SIGNIFICANT CHANGE UP (ref 1.6–2.6)
MAGNESIUM SERPL-MCNC: 2 MG/DL — SIGNIFICANT CHANGE UP (ref 1.6–2.6)
MAGNESIUM SERPL-MCNC: 2.1 MG/DL — SIGNIFICANT CHANGE UP (ref 1.6–2.6)
MAGNESIUM SERPL-MCNC: 2.2 MG/DL — SIGNIFICANT CHANGE UP (ref 1.6–2.6)
MAGNESIUM SERPL-MCNC: 2.3 MG/DL — SIGNIFICANT CHANGE UP (ref 1.6–2.6)
MAGNESIUM SERPL-MCNC: 2.4 MG/DL — SIGNIFICANT CHANGE UP (ref 1.6–2.6)
MAGNESIUM SERPL-MCNC: 2.5 MG/DL — SIGNIFICANT CHANGE UP (ref 1.6–2.6)
MAN DIFF?: NORMAL
MANUAL SMEAR VERIFICATION: SIGNIFICANT CHANGE UP
MCHC RBC-ENTMCNC: 26.8 PG — LOW (ref 27–34)
MCHC RBC-ENTMCNC: 27 PG — SIGNIFICANT CHANGE UP (ref 27–34)
MCHC RBC-ENTMCNC: 27.1 PG — SIGNIFICANT CHANGE UP (ref 27–34)
MCHC RBC-ENTMCNC: 27.3 PG — SIGNIFICANT CHANGE UP (ref 27–34)
MCHC RBC-ENTMCNC: 27.3 PG — SIGNIFICANT CHANGE UP (ref 27–34)
MCHC RBC-ENTMCNC: 27.4 PG — SIGNIFICANT CHANGE UP (ref 27–34)
MCHC RBC-ENTMCNC: 27.6 PG — SIGNIFICANT CHANGE UP (ref 27–34)
MCHC RBC-ENTMCNC: 27.7 PG — SIGNIFICANT CHANGE UP (ref 27–34)
MCHC RBC-ENTMCNC: 27.7 PG — SIGNIFICANT CHANGE UP (ref 27–34)
MCHC RBC-ENTMCNC: 27.8 PG — SIGNIFICANT CHANGE UP (ref 27–34)
MCHC RBC-ENTMCNC: 27.9 PG — SIGNIFICANT CHANGE UP (ref 27–34)
MCHC RBC-ENTMCNC: 28 PG — SIGNIFICANT CHANGE UP (ref 27–34)
MCHC RBC-ENTMCNC: 28.1 PG — SIGNIFICANT CHANGE UP (ref 27–34)
MCHC RBC-ENTMCNC: 28.1 PG — SIGNIFICANT CHANGE UP (ref 27–34)
MCHC RBC-ENTMCNC: 28.2 PG — SIGNIFICANT CHANGE UP (ref 27–34)
MCHC RBC-ENTMCNC: 28.3 PG — SIGNIFICANT CHANGE UP (ref 27–34)
MCHC RBC-ENTMCNC: 28.4 PG — SIGNIFICANT CHANGE UP (ref 27–34)
MCHC RBC-ENTMCNC: 28.5 PG — SIGNIFICANT CHANGE UP (ref 27–34)
MCHC RBC-ENTMCNC: 28.5 PG — SIGNIFICANT CHANGE UP (ref 27–34)
MCHC RBC-ENTMCNC: 28.6 PG — SIGNIFICANT CHANGE UP (ref 27–34)
MCHC RBC-ENTMCNC: 28.7 PG — SIGNIFICANT CHANGE UP (ref 27–34)
MCHC RBC-ENTMCNC: 28.8 PG — SIGNIFICANT CHANGE UP (ref 27–34)
MCHC RBC-ENTMCNC: 28.9 PG — SIGNIFICANT CHANGE UP (ref 27–34)
MCHC RBC-ENTMCNC: 29 GM/DL — LOW (ref 32–36)
MCHC RBC-ENTMCNC: 29 PG — SIGNIFICANT CHANGE UP (ref 27–34)
MCHC RBC-ENTMCNC: 29.1 GM/DL — LOW (ref 32–36)
MCHC RBC-ENTMCNC: 29.1 PG — SIGNIFICANT CHANGE UP (ref 27–34)
MCHC RBC-ENTMCNC: 29.2 PG — SIGNIFICANT CHANGE UP (ref 27–34)
MCHC RBC-ENTMCNC: 29.3 PG — SIGNIFICANT CHANGE UP (ref 27–34)
MCHC RBC-ENTMCNC: 29.4 GM/DL — LOW (ref 32–36)
MCHC RBC-ENTMCNC: 29.4 PG — SIGNIFICANT CHANGE UP (ref 27–34)
MCHC RBC-ENTMCNC: 29.5 GM/DL — LOW (ref 32–36)
MCHC RBC-ENTMCNC: 29.5 PG — SIGNIFICANT CHANGE UP (ref 27–34)
MCHC RBC-ENTMCNC: 29.6 GM/DL — LOW (ref 32–36)
MCHC RBC-ENTMCNC: 29.6 PG — SIGNIFICANT CHANGE UP (ref 27–34)
MCHC RBC-ENTMCNC: 29.7 GM/DL — LOW (ref 32–36)
MCHC RBC-ENTMCNC: 29.7 PG — SIGNIFICANT CHANGE UP (ref 27–34)
MCHC RBC-ENTMCNC: 29.7 PG — SIGNIFICANT CHANGE UP (ref 27–34)
MCHC RBC-ENTMCNC: 29.8 GM/DL — LOW (ref 32–36)
MCHC RBC-ENTMCNC: 29.8 PG — SIGNIFICANT CHANGE UP (ref 27–34)
MCHC RBC-ENTMCNC: 29.9 GM/DL — LOW (ref 32–36)
MCHC RBC-ENTMCNC: 30 GM/DL — LOW (ref 32–36)
MCHC RBC-ENTMCNC: 30.1 GM/DL — LOW (ref 32–36)
MCHC RBC-ENTMCNC: 30.2 GM/DL — LOW (ref 32–36)
MCHC RBC-ENTMCNC: 30.2 PG
MCHC RBC-ENTMCNC: 30.3 GM/DL — LOW (ref 32–36)
MCHC RBC-ENTMCNC: 30.4 GM/DL — LOW (ref 32–36)
MCHC RBC-ENTMCNC: 30.5 GM/DL — LOW (ref 32–36)
MCHC RBC-ENTMCNC: 30.5 GM/DL — LOW (ref 32–36)
MCHC RBC-ENTMCNC: 30.6 GM/DL — LOW (ref 32–36)
MCHC RBC-ENTMCNC: 30.7 GM/DL — LOW (ref 32–36)
MCHC RBC-ENTMCNC: 30.7 GM/DL — LOW (ref 32–36)
MCHC RBC-ENTMCNC: 30.8 GM/DL — LOW (ref 32–36)
MCHC RBC-ENTMCNC: 30.9 GM/DL — LOW (ref 32–36)
MCHC RBC-ENTMCNC: 31 GM/DL — LOW (ref 32–36)
MCHC RBC-ENTMCNC: 31.1 GM/DL — LOW (ref 32–36)
MCHC RBC-ENTMCNC: 31.2 GM/DL — LOW (ref 32–36)
MCHC RBC-ENTMCNC: 31.3 GM/DL — LOW (ref 32–36)
MCHC RBC-ENTMCNC: 31.4 GM/DL — LOW (ref 32–36)
MCHC RBC-ENTMCNC: 31.5 GM/DL
MCHC RBC-ENTMCNC: 31.5 GM/DL — LOW (ref 32–36)
MCHC RBC-ENTMCNC: 31.5 GM/DL — LOW (ref 32–36)
MCHC RBC-ENTMCNC: 31.6 GM/DL — LOW (ref 32–36)
MCHC RBC-ENTMCNC: 31.7 GM/DL — LOW (ref 32–36)
MCHC RBC-ENTMCNC: 31.8 GM/DL — LOW (ref 32–36)
MCHC RBC-ENTMCNC: 31.9 GM/DL — LOW (ref 32–36)
MCHC RBC-ENTMCNC: 32 GM/DL — SIGNIFICANT CHANGE UP (ref 32–36)
MCHC RBC-ENTMCNC: 32 GM/DL — SIGNIFICANT CHANGE UP (ref 32–36)
MCHC RBC-ENTMCNC: 32.3 GM/DL — SIGNIFICANT CHANGE UP (ref 32–36)
MCHC RBC-ENTMCNC: 32.5 GM/DL — SIGNIFICANT CHANGE UP (ref 32–36)
MCV RBC AUTO: 100 FL — SIGNIFICANT CHANGE UP (ref 80–100)
MCV RBC AUTO: 88.4 FL — SIGNIFICANT CHANGE UP (ref 80–100)
MCV RBC AUTO: 89.1 FL — SIGNIFICANT CHANGE UP (ref 80–100)
MCV RBC AUTO: 89.5 FL — SIGNIFICANT CHANGE UP (ref 80–100)
MCV RBC AUTO: 89.5 FL — SIGNIFICANT CHANGE UP (ref 80–100)
MCV RBC AUTO: 89.6 FL — SIGNIFICANT CHANGE UP (ref 80–100)
MCV RBC AUTO: 89.7 FL — SIGNIFICANT CHANGE UP (ref 80–100)
MCV RBC AUTO: 90.3 FL — SIGNIFICANT CHANGE UP (ref 80–100)
MCV RBC AUTO: 90.4 FL — SIGNIFICANT CHANGE UP (ref 80–100)
MCV RBC AUTO: 90.4 FL — SIGNIFICANT CHANGE UP (ref 80–100)
MCV RBC AUTO: 90.6 FL — SIGNIFICANT CHANGE UP (ref 80–100)
MCV RBC AUTO: 90.7 FL — SIGNIFICANT CHANGE UP (ref 80–100)
MCV RBC AUTO: 90.9 FL — SIGNIFICANT CHANGE UP (ref 80–100)
MCV RBC AUTO: 90.9 FL — SIGNIFICANT CHANGE UP (ref 80–100)
MCV RBC AUTO: 91 FL — SIGNIFICANT CHANGE UP (ref 80–100)
MCV RBC AUTO: 91.1 FL — SIGNIFICANT CHANGE UP (ref 80–100)
MCV RBC AUTO: 91.1 FL — SIGNIFICANT CHANGE UP (ref 80–100)
MCV RBC AUTO: 91.2 FL — SIGNIFICANT CHANGE UP (ref 80–100)
MCV RBC AUTO: 91.3 FL — SIGNIFICANT CHANGE UP (ref 80–100)
MCV RBC AUTO: 91.4 FL — SIGNIFICANT CHANGE UP (ref 80–100)
MCV RBC AUTO: 91.4 FL — SIGNIFICANT CHANGE UP (ref 80–100)
MCV RBC AUTO: 91.5 FL — SIGNIFICANT CHANGE UP (ref 80–100)
MCV RBC AUTO: 91.7 FL — SIGNIFICANT CHANGE UP (ref 80–100)
MCV RBC AUTO: 91.8 FL — SIGNIFICANT CHANGE UP (ref 80–100)
MCV RBC AUTO: 91.9 FL — SIGNIFICANT CHANGE UP (ref 80–100)
MCV RBC AUTO: 92 FL — SIGNIFICANT CHANGE UP (ref 80–100)
MCV RBC AUTO: 92.2 FL — SIGNIFICANT CHANGE UP (ref 80–100)
MCV RBC AUTO: 92.2 FL — SIGNIFICANT CHANGE UP (ref 80–100)
MCV RBC AUTO: 92.3 FL — SIGNIFICANT CHANGE UP (ref 80–100)
MCV RBC AUTO: 92.3 FL — SIGNIFICANT CHANGE UP (ref 80–100)
MCV RBC AUTO: 92.5 FL — SIGNIFICANT CHANGE UP (ref 80–100)
MCV RBC AUTO: 92.7 FL — SIGNIFICANT CHANGE UP (ref 80–100)
MCV RBC AUTO: 92.7 FL — SIGNIFICANT CHANGE UP (ref 80–100)
MCV RBC AUTO: 92.8 FL — SIGNIFICANT CHANGE UP (ref 80–100)
MCV RBC AUTO: 92.8 FL — SIGNIFICANT CHANGE UP (ref 80–100)
MCV RBC AUTO: 93 FL — SIGNIFICANT CHANGE UP (ref 80–100)
MCV RBC AUTO: 93.1 FL — SIGNIFICANT CHANGE UP (ref 80–100)
MCV RBC AUTO: 93.1 FL — SIGNIFICANT CHANGE UP (ref 80–100)
MCV RBC AUTO: 93.3 FL — SIGNIFICANT CHANGE UP (ref 80–100)
MCV RBC AUTO: 93.3 FL — SIGNIFICANT CHANGE UP (ref 80–100)
MCV RBC AUTO: 93.4 FL — SIGNIFICANT CHANGE UP (ref 80–100)
MCV RBC AUTO: 93.7 FL — SIGNIFICANT CHANGE UP (ref 80–100)
MCV RBC AUTO: 93.8 FL — SIGNIFICANT CHANGE UP (ref 80–100)
MCV RBC AUTO: 93.8 FL — SIGNIFICANT CHANGE UP (ref 80–100)
MCV RBC AUTO: 94.1 FL — SIGNIFICANT CHANGE UP (ref 80–100)
MCV RBC AUTO: 94.3 FL — SIGNIFICANT CHANGE UP (ref 80–100)
MCV RBC AUTO: 94.5 FL — SIGNIFICANT CHANGE UP (ref 80–100)
MCV RBC AUTO: 94.5 FL — SIGNIFICANT CHANGE UP (ref 80–100)
MCV RBC AUTO: 94.6 FL — SIGNIFICANT CHANGE UP (ref 80–100)
MCV RBC AUTO: 94.6 FL — SIGNIFICANT CHANGE UP (ref 80–100)
MCV RBC AUTO: 94.7 FL — SIGNIFICANT CHANGE UP (ref 80–100)
MCV RBC AUTO: 94.7 FL — SIGNIFICANT CHANGE UP (ref 80–100)
MCV RBC AUTO: 94.8 FL — SIGNIFICANT CHANGE UP (ref 80–100)
MCV RBC AUTO: 94.9 FL — SIGNIFICANT CHANGE UP (ref 80–100)
MCV RBC AUTO: 94.9 FL — SIGNIFICANT CHANGE UP (ref 80–100)
MCV RBC AUTO: 95 FL — SIGNIFICANT CHANGE UP (ref 80–100)
MCV RBC AUTO: 95 FL — SIGNIFICANT CHANGE UP (ref 80–100)
MCV RBC AUTO: 95.2 FL — SIGNIFICANT CHANGE UP (ref 80–100)
MCV RBC AUTO: 95.5 FL — SIGNIFICANT CHANGE UP (ref 80–100)
MCV RBC AUTO: 95.7 FL — SIGNIFICANT CHANGE UP (ref 80–100)
MCV RBC AUTO: 95.7 FL — SIGNIFICANT CHANGE UP (ref 80–100)
MCV RBC AUTO: 95.8 FL — SIGNIFICANT CHANGE UP (ref 80–100)
MCV RBC AUTO: 95.8 FL — SIGNIFICANT CHANGE UP (ref 80–100)
MCV RBC AUTO: 96 FL
MCV RBC AUTO: 96.1 FL — SIGNIFICANT CHANGE UP (ref 80–100)
MCV RBC AUTO: 96.3 FL — SIGNIFICANT CHANGE UP (ref 80–100)
MCV RBC AUTO: 96.5 FL — SIGNIFICANT CHANGE UP (ref 80–100)
MCV RBC AUTO: 96.6 FL — SIGNIFICANT CHANGE UP (ref 80–100)
MCV RBC AUTO: 96.9 FL — SIGNIFICANT CHANGE UP (ref 80–100)
MCV RBC AUTO: 97 FL — SIGNIFICANT CHANGE UP (ref 80–100)
MCV RBC AUTO: 97.1 FL — SIGNIFICANT CHANGE UP (ref 80–100)
MCV RBC AUTO: 97.6 FL — SIGNIFICANT CHANGE UP (ref 80–100)
MCV RBC AUTO: 97.8 FL — SIGNIFICANT CHANGE UP (ref 80–100)
MCV RBC AUTO: 97.9 FL — SIGNIFICANT CHANGE UP (ref 80–100)
MCV RBC AUTO: 98.3 FL — SIGNIFICANT CHANGE UP (ref 80–100)
MCV RBC AUTO: 99.8 FL — SIGNIFICANT CHANGE UP (ref 80–100)
METANEPHRINE, PL: 46.8 PG/ML — SIGNIFICANT CHANGE UP (ref 0–88)
METHGB MFR BLDMV: SIGNIFICANT CHANGE UP % (ref 0–2)
METHOD TYPE: SIGNIFICANT CHANGE UP
MONOCYTES # BLD AUTO: 0 K/UL — SIGNIFICANT CHANGE UP (ref 0–0.9)
MONOCYTES # BLD AUTO: 0.22 K/UL — SIGNIFICANT CHANGE UP (ref 0–0.9)
MONOCYTES # BLD AUTO: 0.29 K/UL — SIGNIFICANT CHANGE UP (ref 0–0.9)
MONOCYTES # BLD AUTO: 0.3 K/UL — SIGNIFICANT CHANGE UP (ref 0–0.9)
MONOCYTES # BLD AUTO: 0.31 K/UL — SIGNIFICANT CHANGE UP (ref 0–0.9)
MONOCYTES # BLD AUTO: 0.49 K/UL — SIGNIFICANT CHANGE UP (ref 0–0.9)
MONOCYTES # BLD AUTO: 0.49 K/UL — SIGNIFICANT CHANGE UP (ref 0–0.9)
MONOCYTES # BLD AUTO: 0.5 K/UL — SIGNIFICANT CHANGE UP (ref 0–0.9)
MONOCYTES # BLD AUTO: 0.51 K/UL — SIGNIFICANT CHANGE UP (ref 0–0.9)
MONOCYTES # BLD AUTO: 0.54 K/UL
MONOCYTES # BLD AUTO: 0.54 K/UL — SIGNIFICANT CHANGE UP (ref 0–0.9)
MONOCYTES # BLD AUTO: 0.65 K/UL — SIGNIFICANT CHANGE UP (ref 0–0.9)
MONOCYTES # BLD AUTO: 0.66 K/UL — SIGNIFICANT CHANGE UP (ref 0–0.9)
MONOCYTES # BLD AUTO: 0.68 K/UL — SIGNIFICANT CHANGE UP (ref 0–0.9)
MONOCYTES # BLD AUTO: 0.7 K/UL — SIGNIFICANT CHANGE UP (ref 0–0.9)
MONOCYTES # BLD AUTO: 0.71 K/UL — SIGNIFICANT CHANGE UP (ref 0–0.9)
MONOCYTES # BLD AUTO: 0.71 K/UL — SIGNIFICANT CHANGE UP (ref 0–0.9)
MONOCYTES # BLD AUTO: 0.72 K/UL — SIGNIFICANT CHANGE UP (ref 0–0.9)
MONOCYTES # BLD AUTO: 0.76 K/UL — SIGNIFICANT CHANGE UP (ref 0–0.9)
MONOCYTES # BLD AUTO: 0.77 K/UL — SIGNIFICANT CHANGE UP (ref 0–0.9)
MONOCYTES # BLD AUTO: 0.77 K/UL — SIGNIFICANT CHANGE UP (ref 0–0.9)
MONOCYTES # BLD AUTO: 0.81 K/UL — SIGNIFICANT CHANGE UP (ref 0–0.9)
MONOCYTES # BLD AUTO: 0.83 K/UL — SIGNIFICANT CHANGE UP (ref 0–0.9)
MONOCYTES # BLD AUTO: 0.84 K/UL — SIGNIFICANT CHANGE UP (ref 0–0.9)
MONOCYTES # BLD AUTO: 0.86 K/UL — SIGNIFICANT CHANGE UP (ref 0–0.9)
MONOCYTES # BLD AUTO: 0.88 K/UL — SIGNIFICANT CHANGE UP (ref 0–0.9)
MONOCYTES # BLD AUTO: 0.9 K/UL — SIGNIFICANT CHANGE UP (ref 0–0.9)
MONOCYTES # BLD AUTO: 0.91 K/UL — HIGH (ref 0–0.9)
MONOCYTES # BLD AUTO: 0.92 K/UL — HIGH (ref 0–0.9)
MONOCYTES # BLD AUTO: 0.92 K/UL — HIGH (ref 0–0.9)
MONOCYTES # BLD AUTO: 0.94 K/UL — HIGH (ref 0–0.9)
MONOCYTES # BLD AUTO: 0.94 K/UL — HIGH (ref 0–0.9)
MONOCYTES # BLD AUTO: 0.96 K/UL — HIGH (ref 0–0.9)
MONOCYTES # BLD AUTO: 0.97 K/UL — HIGH (ref 0–0.9)
MONOCYTES # BLD AUTO: 0.98 K/UL — HIGH (ref 0–0.9)
MONOCYTES # BLD AUTO: 1.01 K/UL — HIGH (ref 0–0.9)
MONOCYTES # BLD AUTO: 1.01 K/UL — HIGH (ref 0–0.9)
MONOCYTES # BLD AUTO: 1.02 K/UL — HIGH (ref 0–0.9)
MONOCYTES # BLD AUTO: 1.02 K/UL — HIGH (ref 0–0.9)
MONOCYTES # BLD AUTO: 1.05 K/UL — HIGH (ref 0–0.9)
MONOCYTES # BLD AUTO: 1.05 K/UL — HIGH (ref 0–0.9)
MONOCYTES # BLD AUTO: 1.06 K/UL — HIGH (ref 0–0.9)
MONOCYTES # BLD AUTO: 1.08 K/UL — HIGH (ref 0–0.9)
MONOCYTES # BLD AUTO: 1.1 K/UL — HIGH (ref 0–0.9)
MONOCYTES # BLD AUTO: 1.12 K/UL — HIGH (ref 0–0.9)
MONOCYTES # BLD AUTO: 1.17 K/UL — HIGH (ref 0–0.9)
MONOCYTES # BLD AUTO: 1.22 K/UL — HIGH (ref 0–0.9)
MONOCYTES # BLD AUTO: 1.24 K/UL — HIGH (ref 0–0.9)
MONOCYTES # BLD AUTO: 1.26 K/UL — HIGH (ref 0–0.9)
MONOCYTES # BLD AUTO: 1.34 K/UL — HIGH (ref 0–0.9)
MONOCYTES # BLD AUTO: 1.37 K/UL — HIGH (ref 0–0.9)
MONOCYTES # BLD AUTO: 1.4 K/UL — HIGH (ref 0–0.9)
MONOCYTES # BLD AUTO: 1.41 K/UL — HIGH (ref 0–0.9)
MONOCYTES # BLD AUTO: 1.45 K/UL — HIGH (ref 0–0.9)
MONOCYTES # BLD AUTO: 1.5 K/UL — HIGH (ref 0–0.9)
MONOCYTES # BLD AUTO: 1.54 K/UL — HIGH (ref 0–0.9)
MONOCYTES # BLD AUTO: 1.59 K/UL — HIGH (ref 0–0.9)
MONOCYTES # BLD AUTO: 1.6 K/UL — HIGH (ref 0–0.9)
MONOCYTES # BLD AUTO: 1.6 K/UL — HIGH (ref 0–0.9)
MONOCYTES # BLD AUTO: 1.65 K/UL — HIGH (ref 0–0.9)
MONOCYTES # BLD AUTO: 1.82 K/UL — HIGH (ref 0–0.9)
MONOCYTES NFR BLD AUTO: 0 % — LOW (ref 2–14)
MONOCYTES NFR BLD AUTO: 10 % — SIGNIFICANT CHANGE UP (ref 2–14)
MONOCYTES NFR BLD AUTO: 10.1 % — SIGNIFICANT CHANGE UP (ref 2–14)
MONOCYTES NFR BLD AUTO: 10.1 % — SIGNIFICANT CHANGE UP (ref 2–14)
MONOCYTES NFR BLD AUTO: 10.2 % — SIGNIFICANT CHANGE UP (ref 2–14)
MONOCYTES NFR BLD AUTO: 10.2 % — SIGNIFICANT CHANGE UP (ref 2–14)
MONOCYTES NFR BLD AUTO: 10.4 %
MONOCYTES NFR BLD AUTO: 10.4 % — SIGNIFICANT CHANGE UP (ref 2–14)
MONOCYTES NFR BLD AUTO: 10.4 % — SIGNIFICANT CHANGE UP (ref 2–14)
MONOCYTES NFR BLD AUTO: 10.5 % — SIGNIFICANT CHANGE UP (ref 2–14)
MONOCYTES NFR BLD AUTO: 10.6 % — SIGNIFICANT CHANGE UP (ref 2–14)
MONOCYTES NFR BLD AUTO: 10.6 % — SIGNIFICANT CHANGE UP (ref 2–14)
MONOCYTES NFR BLD AUTO: 11.1 % — SIGNIFICANT CHANGE UP (ref 2–14)
MONOCYTES NFR BLD AUTO: 11.3 % — SIGNIFICANT CHANGE UP (ref 2–14)
MONOCYTES NFR BLD AUTO: 11.4 % — SIGNIFICANT CHANGE UP (ref 2–14)
MONOCYTES NFR BLD AUTO: 11.7 % — SIGNIFICANT CHANGE UP (ref 2–14)
MONOCYTES NFR BLD AUTO: 11.9 % — SIGNIFICANT CHANGE UP (ref 2–14)
MONOCYTES NFR BLD AUTO: 12.2 % — SIGNIFICANT CHANGE UP (ref 2–14)
MONOCYTES NFR BLD AUTO: 12.2 % — SIGNIFICANT CHANGE UP (ref 2–14)
MONOCYTES NFR BLD AUTO: 12.3 % — SIGNIFICANT CHANGE UP (ref 2–14)
MONOCYTES NFR BLD AUTO: 12.7 % — SIGNIFICANT CHANGE UP (ref 2–14)
MONOCYTES NFR BLD AUTO: 13.3 % — SIGNIFICANT CHANGE UP (ref 2–14)
MONOCYTES NFR BLD AUTO: 13.3 % — SIGNIFICANT CHANGE UP (ref 2–14)
MONOCYTES NFR BLD AUTO: 13.4 % — SIGNIFICANT CHANGE UP (ref 2–14)
MONOCYTES NFR BLD AUTO: 13.5 % — SIGNIFICANT CHANGE UP (ref 2–14)
MONOCYTES NFR BLD AUTO: 13.6 % — SIGNIFICANT CHANGE UP (ref 2–14)
MONOCYTES NFR BLD AUTO: 13.9 % — SIGNIFICANT CHANGE UP (ref 2–14)
MONOCYTES NFR BLD AUTO: 13.9 % — SIGNIFICANT CHANGE UP (ref 2–14)
MONOCYTES NFR BLD AUTO: 14.1 % — HIGH (ref 2–14)
MONOCYTES NFR BLD AUTO: 14.8 % — HIGH (ref 2–14)
MONOCYTES NFR BLD AUTO: 14.9 % — HIGH (ref 2–14)
MONOCYTES NFR BLD AUTO: 15.3 % — HIGH (ref 2–14)
MONOCYTES NFR BLD AUTO: 17.6 % — HIGH (ref 2–14)
MONOCYTES NFR BLD AUTO: 2.6 % — SIGNIFICANT CHANGE UP (ref 2–14)
MONOCYTES NFR BLD AUTO: 2.6 % — SIGNIFICANT CHANGE UP (ref 2–14)
MONOCYTES NFR BLD AUTO: 3 % — SIGNIFICANT CHANGE UP (ref 2–14)
MONOCYTES NFR BLD AUTO: 3 % — SIGNIFICANT CHANGE UP (ref 2–14)
MONOCYTES NFR BLD AUTO: 3.1 % — SIGNIFICANT CHANGE UP (ref 2–14)
MONOCYTES NFR BLD AUTO: 3.4 % — SIGNIFICANT CHANGE UP (ref 2–14)
MONOCYTES NFR BLD AUTO: 3.5 % — SIGNIFICANT CHANGE UP (ref 2–14)
MONOCYTES NFR BLD AUTO: 3.9 % — SIGNIFICANT CHANGE UP (ref 2–14)
MONOCYTES NFR BLD AUTO: 5.8 % — SIGNIFICANT CHANGE UP (ref 2–14)
MONOCYTES NFR BLD AUTO: 5.8 % — SIGNIFICANT CHANGE UP (ref 2–14)
MONOCYTES NFR BLD AUTO: 5.9 % — SIGNIFICANT CHANGE UP (ref 2–14)
MONOCYTES NFR BLD AUTO: 6 % — SIGNIFICANT CHANGE UP (ref 2–14)
MONOCYTES NFR BLD AUTO: 6.9 % — SIGNIFICANT CHANGE UP (ref 2–14)
MONOCYTES NFR BLD AUTO: 7 % — SIGNIFICANT CHANGE UP (ref 2–14)
MONOCYTES NFR BLD AUTO: 7.4 % — SIGNIFICANT CHANGE UP (ref 2–14)
MONOCYTES NFR BLD AUTO: 7.8 % — SIGNIFICANT CHANGE UP (ref 2–14)
MONOCYTES NFR BLD AUTO: 7.9 % — SIGNIFICANT CHANGE UP (ref 2–14)
MONOCYTES NFR BLD AUTO: 8 % — SIGNIFICANT CHANGE UP (ref 2–14)
MONOCYTES NFR BLD AUTO: 8.3 % — SIGNIFICANT CHANGE UP (ref 2–14)
MONOCYTES NFR BLD AUTO: 8.4 % — SIGNIFICANT CHANGE UP (ref 2–14)
MONOCYTES NFR BLD AUTO: 8.9 % — SIGNIFICANT CHANGE UP (ref 2–14)
MONOCYTES NFR BLD AUTO: 9 % — SIGNIFICANT CHANGE UP (ref 2–14)
MONOCYTES NFR BLD AUTO: 9.5 % — SIGNIFICANT CHANGE UP (ref 2–14)
MONOCYTES NFR BLD AUTO: 9.5 % — SIGNIFICANT CHANGE UP (ref 2–14)
MONOCYTES NFR BLD AUTO: 9.6 % — SIGNIFICANT CHANGE UP (ref 2–14)
MONOCYTES NFR BLD AUTO: 9.7 % — SIGNIFICANT CHANGE UP (ref 2–14)
MRSA PCR RESULT.: DETECTED
MRSA SPEC QL CULT: SIGNIFICANT CHANGE UP
MYELOCYTES NFR BLD: 0.9 % — HIGH (ref 0–0)
NEUTROPHILS # BLD AUTO: 10.31 K/UL — HIGH (ref 1.8–7.4)
NEUTROPHILS # BLD AUTO: 10.33 K/UL — HIGH (ref 1.8–7.4)
NEUTROPHILS # BLD AUTO: 10.35 K/UL — HIGH (ref 1.8–7.4)
NEUTROPHILS # BLD AUTO: 10.59 K/UL — HIGH (ref 1.8–7.4)
NEUTROPHILS # BLD AUTO: 11.06 K/UL — HIGH (ref 1.8–7.4)
NEUTROPHILS # BLD AUTO: 11.22 K/UL — HIGH (ref 1.8–7.4)
NEUTROPHILS # BLD AUTO: 11.87 K/UL — HIGH (ref 1.8–7.4)
NEUTROPHILS # BLD AUTO: 12.03 K/UL — HIGH (ref 1.8–7.4)
NEUTROPHILS # BLD AUTO: 12.03 K/UL — HIGH (ref 1.8–7.4)
NEUTROPHILS # BLD AUTO: 13.64 K/UL — HIGH (ref 1.8–7.4)
NEUTROPHILS # BLD AUTO: 13.94 K/UL — HIGH (ref 1.8–7.4)
NEUTROPHILS # BLD AUTO: 14.18 K/UL — HIGH (ref 1.8–7.4)
NEUTROPHILS # BLD AUTO: 14.78 K/UL — HIGH (ref 1.8–7.4)
NEUTROPHILS # BLD AUTO: 16.05 K/UL — HIGH (ref 1.8–7.4)
NEUTROPHILS # BLD AUTO: 17.23 K/UL — HIGH (ref 1.8–7.4)
NEUTROPHILS # BLD AUTO: 3.11 K/UL — SIGNIFICANT CHANGE UP (ref 1.8–7.4)
NEUTROPHILS # BLD AUTO: 3.17 K/UL — SIGNIFICANT CHANGE UP (ref 1.8–7.4)
NEUTROPHILS # BLD AUTO: 3.35 K/UL — SIGNIFICANT CHANGE UP (ref 1.8–7.4)
NEUTROPHILS # BLD AUTO: 3.45 K/UL
NEUTROPHILS # BLD AUTO: 3.81 K/UL — SIGNIFICANT CHANGE UP (ref 1.8–7.4)
NEUTROPHILS # BLD AUTO: 3.98 K/UL — SIGNIFICANT CHANGE UP (ref 1.8–7.4)
NEUTROPHILS # BLD AUTO: 4.05 K/UL — SIGNIFICANT CHANGE UP (ref 1.8–7.4)
NEUTROPHILS # BLD AUTO: 4.32 K/UL — SIGNIFICANT CHANGE UP (ref 1.8–7.4)
NEUTROPHILS # BLD AUTO: 4.36 K/UL — SIGNIFICANT CHANGE UP (ref 1.8–7.4)
NEUTROPHILS # BLD AUTO: 4.39 K/UL — SIGNIFICANT CHANGE UP (ref 1.8–7.4)
NEUTROPHILS # BLD AUTO: 4.95 K/UL — SIGNIFICANT CHANGE UP (ref 1.8–7.4)
NEUTROPHILS # BLD AUTO: 4.95 K/UL — SIGNIFICANT CHANGE UP (ref 1.8–7.4)
NEUTROPHILS # BLD AUTO: 4.98 K/UL — SIGNIFICANT CHANGE UP (ref 1.8–7.4)
NEUTROPHILS # BLD AUTO: 5.03 K/UL — SIGNIFICANT CHANGE UP (ref 1.8–7.4)
NEUTROPHILS # BLD AUTO: 5.04 K/UL — SIGNIFICANT CHANGE UP (ref 1.8–7.4)
NEUTROPHILS # BLD AUTO: 5.22 K/UL — SIGNIFICANT CHANGE UP (ref 1.8–7.4)
NEUTROPHILS # BLD AUTO: 5.25 K/UL — SIGNIFICANT CHANGE UP (ref 1.8–7.4)
NEUTROPHILS # BLD AUTO: 5.27 K/UL — SIGNIFICANT CHANGE UP (ref 1.8–7.4)
NEUTROPHILS # BLD AUTO: 5.37 K/UL — SIGNIFICANT CHANGE UP (ref 1.8–7.4)
NEUTROPHILS # BLD AUTO: 5.65 K/UL — SIGNIFICANT CHANGE UP (ref 1.8–7.4)
NEUTROPHILS # BLD AUTO: 5.85 K/UL — SIGNIFICANT CHANGE UP (ref 1.8–7.4)
NEUTROPHILS # BLD AUTO: 5.97 K/UL — SIGNIFICANT CHANGE UP (ref 1.8–7.4)
NEUTROPHILS # BLD AUTO: 6 K/UL — SIGNIFICANT CHANGE UP (ref 1.8–7.4)
NEUTROPHILS # BLD AUTO: 6 K/UL — SIGNIFICANT CHANGE UP (ref 1.8–7.4)
NEUTROPHILS # BLD AUTO: 6.1 K/UL — SIGNIFICANT CHANGE UP (ref 1.8–7.4)
NEUTROPHILS # BLD AUTO: 6.16 K/UL — SIGNIFICANT CHANGE UP (ref 1.8–7.4)
NEUTROPHILS # BLD AUTO: 6.21 K/UL — SIGNIFICANT CHANGE UP (ref 1.8–7.4)
NEUTROPHILS # BLD AUTO: 6.35 K/UL — SIGNIFICANT CHANGE UP (ref 1.8–7.4)
NEUTROPHILS # BLD AUTO: 6.36 K/UL — SIGNIFICANT CHANGE UP (ref 1.8–7.4)
NEUTROPHILS # BLD AUTO: 6.37 K/UL — SIGNIFICANT CHANGE UP (ref 1.8–7.4)
NEUTROPHILS # BLD AUTO: 6.48 K/UL — SIGNIFICANT CHANGE UP (ref 1.8–7.4)
NEUTROPHILS # BLD AUTO: 6.53 K/UL — SIGNIFICANT CHANGE UP (ref 1.8–7.4)
NEUTROPHILS # BLD AUTO: 6.73 K/UL — SIGNIFICANT CHANGE UP (ref 1.8–7.4)
NEUTROPHILS # BLD AUTO: 6.91 K/UL — SIGNIFICANT CHANGE UP (ref 1.8–7.4)
NEUTROPHILS # BLD AUTO: 7.08 K/UL — SIGNIFICANT CHANGE UP (ref 1.8–7.4)
NEUTROPHILS # BLD AUTO: 7.13 K/UL — SIGNIFICANT CHANGE UP (ref 1.8–7.4)
NEUTROPHILS # BLD AUTO: 7.13 K/UL — SIGNIFICANT CHANGE UP (ref 1.8–7.4)
NEUTROPHILS # BLD AUTO: 7.35 K/UL — SIGNIFICANT CHANGE UP (ref 1.8–7.4)
NEUTROPHILS # BLD AUTO: 7.37 K/UL — SIGNIFICANT CHANGE UP (ref 1.8–7.4)
NEUTROPHILS # BLD AUTO: 7.5 K/UL — HIGH (ref 1.8–7.4)
NEUTROPHILS # BLD AUTO: 7.5 K/UL — HIGH (ref 1.8–7.4)
NEUTROPHILS # BLD AUTO: 7.68 K/UL — HIGH (ref 1.8–7.4)
NEUTROPHILS # BLD AUTO: 7.75 K/UL — HIGH (ref 1.8–7.4)
NEUTROPHILS # BLD AUTO: 7.75 K/UL — HIGH (ref 1.8–7.4)
NEUTROPHILS # BLD AUTO: 7.84 K/UL — HIGH (ref 1.8–7.4)
NEUTROPHILS # BLD AUTO: 7.91 K/UL — HIGH (ref 1.8–7.4)
NEUTROPHILS # BLD AUTO: 7.99 K/UL — HIGH (ref 1.8–7.4)
NEUTROPHILS # BLD AUTO: 8.8 K/UL — HIGH (ref 1.8–7.4)
NEUTROPHILS # BLD AUTO: 8.87 K/UL — HIGH (ref 1.8–7.4)
NEUTROPHILS # BLD AUTO: 8.87 K/UL — HIGH (ref 1.8–7.4)
NEUTROPHILS NFR BLD AUTO: 52.5 % — SIGNIFICANT CHANGE UP (ref 43–77)
NEUTROPHILS NFR BLD AUTO: 57.7 % — SIGNIFICANT CHANGE UP (ref 43–77)
NEUTROPHILS NFR BLD AUTO: 58 % — SIGNIFICANT CHANGE UP (ref 43–77)
NEUTROPHILS NFR BLD AUTO: 58.1 % — SIGNIFICANT CHANGE UP (ref 43–77)
NEUTROPHILS NFR BLD AUTO: 58.6 % — SIGNIFICANT CHANGE UP (ref 43–77)
NEUTROPHILS NFR BLD AUTO: 59 % — SIGNIFICANT CHANGE UP (ref 43–77)
NEUTROPHILS NFR BLD AUTO: 60.3 % — SIGNIFICANT CHANGE UP (ref 43–77)
NEUTROPHILS NFR BLD AUTO: 60.5 % — SIGNIFICANT CHANGE UP (ref 43–77)
NEUTROPHILS NFR BLD AUTO: 61 % — SIGNIFICANT CHANGE UP (ref 43–77)
NEUTROPHILS NFR BLD AUTO: 61.2 % — SIGNIFICANT CHANGE UP (ref 43–77)
NEUTROPHILS NFR BLD AUTO: 61.8 % — SIGNIFICANT CHANGE UP (ref 43–77)
NEUTROPHILS NFR BLD AUTO: 62 % — SIGNIFICANT CHANGE UP (ref 43–77)
NEUTROPHILS NFR BLD AUTO: 62.3 % — SIGNIFICANT CHANGE UP (ref 43–77)
NEUTROPHILS NFR BLD AUTO: 62.5 % — SIGNIFICANT CHANGE UP (ref 43–77)
NEUTROPHILS NFR BLD AUTO: 64 % — SIGNIFICANT CHANGE UP (ref 43–77)
NEUTROPHILS NFR BLD AUTO: 64.1 % — SIGNIFICANT CHANGE UP (ref 43–77)
NEUTROPHILS NFR BLD AUTO: 64.1 % — SIGNIFICANT CHANGE UP (ref 43–77)
NEUTROPHILS NFR BLD AUTO: 64.2 % — SIGNIFICANT CHANGE UP (ref 43–77)
NEUTROPHILS NFR BLD AUTO: 64.3 % — SIGNIFICANT CHANGE UP (ref 43–77)
NEUTROPHILS NFR BLD AUTO: 64.6 % — SIGNIFICANT CHANGE UP (ref 43–77)
NEUTROPHILS NFR BLD AUTO: 64.8 % — SIGNIFICANT CHANGE UP (ref 43–77)
NEUTROPHILS NFR BLD AUTO: 64.9 % — SIGNIFICANT CHANGE UP (ref 43–77)
NEUTROPHILS NFR BLD AUTO: 65 % — SIGNIFICANT CHANGE UP (ref 43–77)
NEUTROPHILS NFR BLD AUTO: 65.7 % — SIGNIFICANT CHANGE UP (ref 43–77)
NEUTROPHILS NFR BLD AUTO: 66.1 % — SIGNIFICANT CHANGE UP (ref 43–77)
NEUTROPHILS NFR BLD AUTO: 66.6 %
NEUTROPHILS NFR BLD AUTO: 66.7 % — SIGNIFICANT CHANGE UP (ref 43–77)
NEUTROPHILS NFR BLD AUTO: 66.8 % — SIGNIFICANT CHANGE UP (ref 43–77)
NEUTROPHILS NFR BLD AUTO: 67.3 % — SIGNIFICANT CHANGE UP (ref 43–77)
NEUTROPHILS NFR BLD AUTO: 67.5 % — SIGNIFICANT CHANGE UP (ref 43–77)
NEUTROPHILS NFR BLD AUTO: 68.3 % — SIGNIFICANT CHANGE UP (ref 43–77)
NEUTROPHILS NFR BLD AUTO: 69.5 % — SIGNIFICANT CHANGE UP (ref 43–77)
NEUTROPHILS NFR BLD AUTO: 69.6 % — SIGNIFICANT CHANGE UP (ref 43–77)
NEUTROPHILS NFR BLD AUTO: 72 % — SIGNIFICANT CHANGE UP (ref 43–77)
NEUTROPHILS NFR BLD AUTO: 72.2 % — SIGNIFICANT CHANGE UP (ref 43–77)
NEUTROPHILS NFR BLD AUTO: 72.8 % — SIGNIFICANT CHANGE UP (ref 43–77)
NEUTROPHILS NFR BLD AUTO: 73 % — SIGNIFICANT CHANGE UP (ref 43–77)
NEUTROPHILS NFR BLD AUTO: 74.1 % — SIGNIFICANT CHANGE UP (ref 43–77)
NEUTROPHILS NFR BLD AUTO: 74.6 % — SIGNIFICANT CHANGE UP (ref 43–77)
NEUTROPHILS NFR BLD AUTO: 76.4 % — SIGNIFICANT CHANGE UP (ref 43–77)
NEUTROPHILS NFR BLD AUTO: 76.5 % — SIGNIFICANT CHANGE UP (ref 43–77)
NEUTROPHILS NFR BLD AUTO: 77.2 % — HIGH (ref 43–77)
NEUTROPHILS NFR BLD AUTO: 77.4 % — HIGH (ref 43–77)
NEUTROPHILS NFR BLD AUTO: 77.4 % — HIGH (ref 43–77)
NEUTROPHILS NFR BLD AUTO: 77.9 % — HIGH (ref 43–77)
NEUTROPHILS NFR BLD AUTO: 78.1 % — HIGH (ref 43–77)
NEUTROPHILS NFR BLD AUTO: 78.7 % — HIGH (ref 43–77)
NEUTROPHILS NFR BLD AUTO: 78.7 % — HIGH (ref 43–77)
NEUTROPHILS NFR BLD AUTO: 79.5 % — HIGH (ref 43–77)
NEUTROPHILS NFR BLD AUTO: 79.8 % — HIGH (ref 43–77)
NEUTROPHILS NFR BLD AUTO: 81.1 % — HIGH (ref 43–77)
NEUTROPHILS NFR BLD AUTO: 83.4 % — HIGH (ref 43–77)
NEUTROPHILS NFR BLD AUTO: 83.7 % — HIGH (ref 43–77)
NEUTROPHILS NFR BLD AUTO: 84.6 % — HIGH (ref 43–77)
NEUTROPHILS NFR BLD AUTO: 84.6 % — HIGH (ref 43–77)
NEUTROPHILS NFR BLD AUTO: 85 % — HIGH (ref 43–77)
NEUTROPHILS NFR BLD AUTO: 85.7 % — HIGH (ref 43–77)
NEUTROPHILS NFR BLD AUTO: 87.7 % — HIGH (ref 43–77)
NEUTROPHILS NFR BLD AUTO: 89.5 % — HIGH (ref 43–77)
NEUTROPHILS NFR BLD AUTO: 90 % — HIGH (ref 43–77)
NEUTROPHILS NFR BLD AUTO: 90 % — HIGH (ref 43–77)
NEUTROPHILS NFR BLD AUTO: 91.3 % — HIGH (ref 43–77)
NEUTROPHILS NFR BLD AUTO: 91.4 % — HIGH (ref 43–77)
NEUTS BAND # BLD: 1.7 % — SIGNIFICANT CHANGE UP (ref 0–8)
NEUTS BAND # BLD: 3.5 % — SIGNIFICANT CHANGE UP (ref 0–8)
NEUTS BAND # BLD: 6.2 % — SIGNIFICANT CHANGE UP (ref 0–8)
NEUTS BAND # BLD: 7 % — SIGNIFICANT CHANGE UP (ref 0–8)
NITRITE UR-MCNC: NEGATIVE — SIGNIFICANT CHANGE UP
NITRITE UR-MCNC: POSITIVE
NON HDL CHOLESTEROL: 40 MG/DL — SIGNIFICANT CHANGE UP
NORMETANEPHRINE, PL: 103.2 PG/ML — SIGNIFICANT CHANGE UP (ref 0–285.2)
NRBC # BLD: 0 /100 WBCS — SIGNIFICANT CHANGE UP (ref 0–0)
NRBC # BLD: SIGNIFICANT CHANGE UP /100 WBCS (ref 0–0)
NRBC # BLD: SIGNIFICANT CHANGE UP /100 WBCS (ref 0–0)
NT-PROBNP SERPL-SCNC: HIGH PG/ML (ref 0–125)
NT-PROBNP SERPL-SCNC: HIGH PG/ML (ref 0–300)
O2 CT VFR BLD CALC: 37 MMHG — SIGNIFICANT CHANGE UP
OB PNL STL: NEGATIVE — SIGNIFICANT CHANGE UP
OB PNL STL: POSITIVE
ORGANISM # SPEC MICROSCOPIC CNT: ABNORMAL
ORGANISM # SPEC MICROSCOPIC CNT: SIGNIFICANT CHANGE UP
OSMOLALITY UR: 207 MOS/KG — LOW (ref 300–900)
OXYHGB MFR BLDMV: SIGNIFICANT CHANGE UP
PAT CTL 2H: 30.7 SEC — SIGNIFICANT CHANGE UP (ref 24.5–36.6)
PCO2 BLDA: 27 MMHG — LOW (ref 32–35)
PCO2 BLDA: 32 MMHG — SIGNIFICANT CHANGE UP (ref 32–35)
PCO2 BLDA: 32 MMHG — SIGNIFICANT CHANGE UP (ref 32–35)
PCO2 BLDA: 34 MMHG — SIGNIFICANT CHANGE UP (ref 32–35)
PCO2 BLDA: 34 MMHG — SIGNIFICANT CHANGE UP (ref 32–35)
PCO2 BLDA: 37 MMHG — HIGH (ref 32–35)
PCO2 BLDA: 38 MMHG — HIGH (ref 32–35)
PCO2 BLDA: 56 MMHG — HIGH (ref 32–35)
PCO2 BLDMV: 42 MMHG — SIGNIFICANT CHANGE UP
PCO2 BLDV: 47 MMHG — HIGH (ref 39–42)
PCO2 BLDV: 58 MMHG — HIGH (ref 39–42)
PCO2 BLDV: 63 MMHG — HIGH (ref 39–42)
PCO2 BLDV: 69 MMHG — HIGH (ref 39–42)
PCO2 BLDV: 69 MMHG — HIGH (ref 39–42)
PCO2 BLDV: 74 MMHG — HIGH (ref 39–42)
PF4 HEPARIN CMPLX AB SER-ACNC: NEGATIVE — SIGNIFICANT CHANGE UP
PH BLDA: 7 — CRITICAL LOW (ref 7.35–7.45)
PH BLDA: 7.19 — CRITICAL LOW (ref 7.35–7.45)
PH BLDA: 7.33 — LOW (ref 7.35–7.45)
PH BLDA: 7.39 — SIGNIFICANT CHANGE UP (ref 7.35–7.45)
PH BLDA: 7.41 — SIGNIFICANT CHANGE UP (ref 7.35–7.45)
PH BLDA: 7.47 — HIGH (ref 7.35–7.45)
PH BLDA: 7.48 — HIGH (ref 7.35–7.45)
PH BLDA: 7.51 — HIGH (ref 7.35–7.45)
PH BLDMV: 7.39 — SIGNIFICANT CHANGE UP
PH BLDV: 7.29 — LOW (ref 7.32–7.43)
PH BLDV: 7.34 — SIGNIFICANT CHANGE UP (ref 7.32–7.43)
PH BLDV: 7.35 — SIGNIFICANT CHANGE UP (ref 7.32–7.43)
PH UR: 5 — SIGNIFICANT CHANGE UP (ref 5–8)
PH UR: 5.5 — SIGNIFICANT CHANGE UP (ref 5–8)
PHOSPHATE SERPL-MCNC: 1.2 MG/DL — LOW (ref 2.5–4.5)
PHOSPHATE SERPL-MCNC: 1.3 MG/DL — LOW (ref 2.5–4.5)
PHOSPHATE SERPL-MCNC: 1.5 MG/DL — LOW (ref 2.5–4.5)
PHOSPHATE SERPL-MCNC: 1.6 MG/DL — LOW (ref 2.5–4.5)
PHOSPHATE SERPL-MCNC: 1.7 MG/DL — LOW (ref 2.5–4.5)
PHOSPHATE SERPL-MCNC: 1.7 MG/DL — LOW (ref 2.5–4.5)
PHOSPHATE SERPL-MCNC: 1.8 MG/DL — LOW (ref 2.5–4.5)
PHOSPHATE SERPL-MCNC: 1.9 MG/DL — LOW (ref 2.5–4.5)
PHOSPHATE SERPL-MCNC: 2 MG/DL — LOW (ref 2.5–4.5)
PHOSPHATE SERPL-MCNC: 2.1 MG/DL — LOW (ref 2.5–4.5)
PHOSPHATE SERPL-MCNC: 2.1 MG/DL — LOW (ref 2.5–4.5)
PHOSPHATE SERPL-MCNC: 2.3 MG/DL — LOW (ref 2.5–4.5)
PHOSPHATE SERPL-MCNC: 2.5 MG/DL — SIGNIFICANT CHANGE UP (ref 2.5–4.5)
PHOSPHATE SERPL-MCNC: 2.6 MG/DL — SIGNIFICANT CHANGE UP (ref 2.5–4.5)
PHOSPHATE SERPL-MCNC: 2.7 MG/DL — SIGNIFICANT CHANGE UP (ref 2.5–4.5)
PHOSPHATE SERPL-MCNC: 2.8 MG/DL — SIGNIFICANT CHANGE UP (ref 2.5–4.5)
PHOSPHATE SERPL-MCNC: 3 MG/DL — SIGNIFICANT CHANGE UP (ref 2.5–4.5)
PHOSPHATE SERPL-MCNC: 3.1 MG/DL — SIGNIFICANT CHANGE UP (ref 2.5–4.5)
PHOSPHATE SERPL-MCNC: 3.1 MG/DL — SIGNIFICANT CHANGE UP (ref 2.5–4.5)
PHOSPHATE SERPL-MCNC: 3.2 MG/DL — SIGNIFICANT CHANGE UP (ref 2.5–4.5)
PHOSPHATE SERPL-MCNC: 3.3 MG/DL — SIGNIFICANT CHANGE UP (ref 2.5–4.5)
PHOSPHATE SERPL-MCNC: 3.4 MG/DL — SIGNIFICANT CHANGE UP (ref 2.5–4.5)
PHOSPHATE SERPL-MCNC: 3.4 MG/DL — SIGNIFICANT CHANGE UP (ref 2.5–4.5)
PHOSPHATE SERPL-MCNC: 3.5 MG/DL — SIGNIFICANT CHANGE UP (ref 2.5–4.5)
PHOSPHATE SERPL-MCNC: 3.6 MG/DL — SIGNIFICANT CHANGE UP (ref 2.5–4.5)
PHOSPHATE SERPL-MCNC: 3.7 MG/DL — SIGNIFICANT CHANGE UP (ref 2.5–4.5)
PHOSPHATE SERPL-MCNC: 3.8 MG/DL — SIGNIFICANT CHANGE UP (ref 2.5–4.5)
PHOSPHATE SERPL-MCNC: 3.9 MG/DL — SIGNIFICANT CHANGE UP (ref 2.5–4.5)
PHOSPHATE SERPL-MCNC: 4 MG/DL — SIGNIFICANT CHANGE UP (ref 2.5–4.5)
PHOSPHATE SERPL-MCNC: 4.1 MG/DL — SIGNIFICANT CHANGE UP (ref 2.5–4.5)
PHOSPHATE SERPL-MCNC: 4.1 MG/DL — SIGNIFICANT CHANGE UP (ref 2.5–4.5)
PHOSPHATE SERPL-MCNC: 4.2 MG/DL — SIGNIFICANT CHANGE UP (ref 2.5–4.5)
PHOSPHATE SERPL-MCNC: 4.3 MG/DL — SIGNIFICANT CHANGE UP (ref 2.5–4.5)
PHOSPHATE SERPL-MCNC: 4.4 MG/DL — SIGNIFICANT CHANGE UP (ref 2.5–4.5)
PHOSPHATE SERPL-MCNC: 4.5 MG/DL — SIGNIFICANT CHANGE UP (ref 2.5–4.5)
PHOSPHATE SERPL-MCNC: 4.7 MG/DL — HIGH (ref 2.5–4.5)
PHOSPHATE SERPL-MCNC: 4.8 MG/DL — HIGH (ref 2.5–4.5)
PHOSPHATE SERPL-MCNC: 4.9 MG/DL — HIGH (ref 2.5–4.5)
PHOSPHATE SERPL-MCNC: 4.9 MG/DL — HIGH (ref 2.5–4.5)
PHOSPHATE SERPL-MCNC: 5.1 MG/DL — HIGH (ref 2.5–4.5)
PHOSPHATE SERPL-MCNC: 5.4 MG/DL — HIGH (ref 2.5–4.5)
PHOSPHATE SERPL-MCNC: 5.4 MG/DL — HIGH (ref 2.5–4.5)
PHOSPHATE SERPL-MCNC: 5.6 MG/DL — HIGH (ref 2.5–4.5)
PHOSPHATE SERPL-MCNC: 5.9 MG/DL — HIGH (ref 2.5–4.5)
PHOSPHATE SERPL-MCNC: 5.9 MG/DL — HIGH (ref 2.5–4.5)
PHOSPHATE SERPL-MCNC: 6 MG/DL — HIGH (ref 2.5–4.5)
PLAT MORPH BLD: ABNORMAL
PLAT MORPH BLD: NORMAL — SIGNIFICANT CHANGE UP
PLATELET # BLD AUTO: 107 K/UL — LOW (ref 150–400)
PLATELET # BLD AUTO: 114 K/UL — LOW (ref 150–400)
PLATELET # BLD AUTO: 119 K/UL — LOW (ref 150–400)
PLATELET # BLD AUTO: 119 K/UL — LOW (ref 150–400)
PLATELET # BLD AUTO: 120 K/UL — LOW (ref 150–400)
PLATELET # BLD AUTO: 124 K/UL — LOW (ref 150–400)
PLATELET # BLD AUTO: 126 K/UL — LOW (ref 150–400)
PLATELET # BLD AUTO: 129 K/UL — LOW (ref 150–400)
PLATELET # BLD AUTO: 135 K/UL — LOW (ref 150–400)
PLATELET # BLD AUTO: 137 K/UL — LOW (ref 150–400)
PLATELET # BLD AUTO: 142 K/UL — LOW (ref 150–400)
PLATELET # BLD AUTO: 143 K/UL — LOW (ref 150–400)
PLATELET # BLD AUTO: 145 K/UL — LOW (ref 150–400)
PLATELET # BLD AUTO: 149 K/UL — LOW (ref 150–400)
PLATELET # BLD AUTO: 149 K/UL — LOW (ref 150–400)
PLATELET # BLD AUTO: 151 K/UL — SIGNIFICANT CHANGE UP (ref 150–400)
PLATELET # BLD AUTO: 151 K/UL — SIGNIFICANT CHANGE UP (ref 150–400)
PLATELET # BLD AUTO: 155 K/UL — SIGNIFICANT CHANGE UP (ref 150–400)
PLATELET # BLD AUTO: 159 K/UL — SIGNIFICANT CHANGE UP (ref 150–400)
PLATELET # BLD AUTO: 159 K/UL — SIGNIFICANT CHANGE UP (ref 150–400)
PLATELET # BLD AUTO: 16 K/UL — CRITICAL LOW (ref 150–400)
PLATELET # BLD AUTO: 16 K/UL — CRITICAL LOW (ref 150–400)
PLATELET # BLD AUTO: 161 K/UL — SIGNIFICANT CHANGE UP (ref 150–400)
PLATELET # BLD AUTO: 162 K/UL — SIGNIFICANT CHANGE UP (ref 150–400)
PLATELET # BLD AUTO: 163 K/UL — SIGNIFICANT CHANGE UP (ref 150–400)
PLATELET # BLD AUTO: 166 K/UL — SIGNIFICANT CHANGE UP (ref 150–400)
PLATELET # BLD AUTO: 166 K/UL — SIGNIFICANT CHANGE UP (ref 150–400)
PLATELET # BLD AUTO: 172 K/UL — SIGNIFICANT CHANGE UP (ref 150–400)
PLATELET # BLD AUTO: 172 K/UL — SIGNIFICANT CHANGE UP (ref 150–400)
PLATELET # BLD AUTO: 173 K/UL — SIGNIFICANT CHANGE UP (ref 150–400)
PLATELET # BLD AUTO: 174 K/UL — SIGNIFICANT CHANGE UP (ref 150–400)
PLATELET # BLD AUTO: 175 K/UL — SIGNIFICANT CHANGE UP (ref 150–400)
PLATELET # BLD AUTO: 176 K/UL — SIGNIFICANT CHANGE UP (ref 150–400)
PLATELET # BLD AUTO: 176 K/UL — SIGNIFICANT CHANGE UP (ref 150–400)
PLATELET # BLD AUTO: 178 K/UL — SIGNIFICANT CHANGE UP (ref 150–400)
PLATELET # BLD AUTO: 180 K/UL — SIGNIFICANT CHANGE UP (ref 150–400)
PLATELET # BLD AUTO: 180 K/UL — SIGNIFICANT CHANGE UP (ref 150–400)
PLATELET # BLD AUTO: 181 K/UL — SIGNIFICANT CHANGE UP (ref 150–400)
PLATELET # BLD AUTO: 181 K/UL — SIGNIFICANT CHANGE UP (ref 150–400)
PLATELET # BLD AUTO: 182 K/UL — SIGNIFICANT CHANGE UP (ref 150–400)
PLATELET # BLD AUTO: 183 K/UL — SIGNIFICANT CHANGE UP (ref 150–400)
PLATELET # BLD AUTO: 184 K/UL — SIGNIFICANT CHANGE UP (ref 150–400)
PLATELET # BLD AUTO: 185 K/UL — SIGNIFICANT CHANGE UP (ref 150–400)
PLATELET # BLD AUTO: 186 K/UL — SIGNIFICANT CHANGE UP (ref 150–400)
PLATELET # BLD AUTO: 186 K/UL — SIGNIFICANT CHANGE UP (ref 150–400)
PLATELET # BLD AUTO: 188 K/UL — SIGNIFICANT CHANGE UP (ref 150–400)
PLATELET # BLD AUTO: 189 K/UL — SIGNIFICANT CHANGE UP (ref 150–400)
PLATELET # BLD AUTO: 193 K/UL — SIGNIFICANT CHANGE UP (ref 150–400)
PLATELET # BLD AUTO: 194 K/UL — SIGNIFICANT CHANGE UP (ref 150–400)
PLATELET # BLD AUTO: 203 K/UL — SIGNIFICANT CHANGE UP (ref 150–400)
PLATELET # BLD AUTO: 205 K/UL — SIGNIFICANT CHANGE UP (ref 150–400)
PLATELET # BLD AUTO: 206 K/UL — SIGNIFICANT CHANGE UP (ref 150–400)
PLATELET # BLD AUTO: 206 K/UL — SIGNIFICANT CHANGE UP (ref 150–400)
PLATELET # BLD AUTO: 207 K/UL — SIGNIFICANT CHANGE UP (ref 150–400)
PLATELET # BLD AUTO: 207 K/UL — SIGNIFICANT CHANGE UP (ref 150–400)
PLATELET # BLD AUTO: 209 K/UL — SIGNIFICANT CHANGE UP (ref 150–400)
PLATELET # BLD AUTO: 21 K/UL — LOW (ref 150–400)
PLATELET # BLD AUTO: 21 K/UL — LOW (ref 150–400)
PLATELET # BLD AUTO: 211 K/UL — SIGNIFICANT CHANGE UP (ref 150–400)
PLATELET # BLD AUTO: 216 K/UL — SIGNIFICANT CHANGE UP (ref 150–400)
PLATELET # BLD AUTO: 217 K/UL — SIGNIFICANT CHANGE UP (ref 150–400)
PLATELET # BLD AUTO: 219 K/UL — SIGNIFICANT CHANGE UP (ref 150–400)
PLATELET # BLD AUTO: 219 K/UL — SIGNIFICANT CHANGE UP (ref 150–400)
PLATELET # BLD AUTO: 220 K/UL — SIGNIFICANT CHANGE UP (ref 150–400)
PLATELET # BLD AUTO: 220 K/UL — SIGNIFICANT CHANGE UP (ref 150–400)
PLATELET # BLD AUTO: 223 K/UL — SIGNIFICANT CHANGE UP (ref 150–400)
PLATELET # BLD AUTO: 223 K/UL — SIGNIFICANT CHANGE UP (ref 150–400)
PLATELET # BLD AUTO: 225 K/UL — SIGNIFICANT CHANGE UP (ref 150–400)
PLATELET # BLD AUTO: 228 K/UL — SIGNIFICANT CHANGE UP (ref 150–400)
PLATELET # BLD AUTO: 228 K/UL — SIGNIFICANT CHANGE UP (ref 150–400)
PLATELET # BLD AUTO: 231 K/UL — SIGNIFICANT CHANGE UP (ref 150–400)
PLATELET # BLD AUTO: 231 K/UL — SIGNIFICANT CHANGE UP (ref 150–400)
PLATELET # BLD AUTO: 234 K/UL — SIGNIFICANT CHANGE UP (ref 150–400)
PLATELET # BLD AUTO: 237 K/UL — SIGNIFICANT CHANGE UP (ref 150–400)
PLATELET # BLD AUTO: 241 K/UL
PLATELET # BLD AUTO: 25 K/UL — LOW (ref 150–400)
PLATELET # BLD AUTO: 251 K/UL — SIGNIFICANT CHANGE UP (ref 150–400)
PLATELET # BLD AUTO: 252 K/UL — SIGNIFICANT CHANGE UP (ref 150–400)
PLATELET # BLD AUTO: 256 K/UL — SIGNIFICANT CHANGE UP (ref 150–400)
PLATELET # BLD AUTO: 274 K/UL — SIGNIFICANT CHANGE UP (ref 150–400)
PLATELET # BLD AUTO: 287 K/UL — SIGNIFICANT CHANGE UP (ref 150–400)
PLATELET # BLD AUTO: 33 K/UL — LOW (ref 150–400)
PLATELET # BLD AUTO: 34 K/UL — LOW (ref 150–400)
PLATELET # BLD AUTO: 46 K/UL — LOW (ref 150–400)
PLATELET # BLD AUTO: 56 K/UL — LOW (ref 150–400)
PLATELET # BLD AUTO: 69 K/UL — LOW (ref 150–400)
PLATELET # BLD AUTO: 70 K/UL — LOW (ref 150–400)
PLATELET # BLD AUTO: 74 K/UL — LOW (ref 150–400)
PLATELET # BLD AUTO: 88 K/UL — LOW (ref 150–400)
PLATELET # BLD AUTO: 95 K/UL — LOW (ref 150–400)
PO2 BLDA: 102 MMHG — SIGNIFICANT CHANGE UP (ref 83–108)
PO2 BLDA: 122 MMHG — HIGH (ref 83–108)
PO2 BLDA: 177 MMHG — HIGH (ref 83–108)
PO2 BLDA: 179 MMHG — HIGH (ref 83–108)
PO2 BLDA: 257 MMHG — HIGH (ref 83–108)
PO2 BLDA: 367 MMHG — HIGH (ref 83–108)
PO2 BLDA: 88 MMHG — SIGNIFICANT CHANGE UP (ref 83–108)
PO2 BLDA: 90 MMHG — SIGNIFICANT CHANGE UP (ref 83–108)
PO2 BLDV: 37 MMHG — SIGNIFICANT CHANGE UP (ref 25–45)
PO2 BLDV: 41 MMHG — SIGNIFICANT CHANGE UP (ref 25–45)
PO2 BLDV: 42 MMHG — SIGNIFICANT CHANGE UP (ref 25–45)
PO2 BLDV: 44 MMHG — SIGNIFICANT CHANGE UP (ref 25–45)
PO2 BLDV: 45 MMHG — SIGNIFICANT CHANGE UP (ref 25–45)
PO2 BLDV: <35 MMHG — SIGNIFICANT CHANGE UP (ref 25–45)
POIKILOCYTOSIS BLD QL AUTO: SIGNIFICANT CHANGE UP
POIKILOCYTOSIS BLD QL AUTO: SLIGHT — SIGNIFICANT CHANGE UP
POTASSIUM BLDV-SCNC: 3.7 MMOL/L — SIGNIFICANT CHANGE UP (ref 3.5–5.1)
POTASSIUM BLDV-SCNC: 4.9 MMOL/L — SIGNIFICANT CHANGE UP (ref 3.5–5.1)
POTASSIUM BLDV-SCNC: 5.7 MMOL/L — HIGH (ref 3.5–5.1)
POTASSIUM BLDV-SCNC: 7 MMOL/L — CRITICAL HIGH (ref 3.5–5.1)
POTASSIUM SERPL-MCNC: 3.6 MMOL/L — SIGNIFICANT CHANGE UP (ref 3.5–5.3)
POTASSIUM SERPL-MCNC: 3.7 MMOL/L — SIGNIFICANT CHANGE UP (ref 3.5–5.3)
POTASSIUM SERPL-MCNC: 3.8 MMOL/L — SIGNIFICANT CHANGE UP (ref 3.5–5.3)
POTASSIUM SERPL-MCNC: 3.9 MMOL/L — SIGNIFICANT CHANGE UP (ref 3.5–5.3)
POTASSIUM SERPL-MCNC: 4 MMOL/L — SIGNIFICANT CHANGE UP (ref 3.5–5.3)
POTASSIUM SERPL-MCNC: 4.1 MMOL/L — SIGNIFICANT CHANGE UP (ref 3.5–5.3)
POTASSIUM SERPL-MCNC: 4.2 MMOL/L — SIGNIFICANT CHANGE UP (ref 3.5–5.3)
POTASSIUM SERPL-MCNC: 4.3 MMOL/L — SIGNIFICANT CHANGE UP (ref 3.5–5.3)
POTASSIUM SERPL-MCNC: 4.4 MMOL/L — SIGNIFICANT CHANGE UP (ref 3.5–5.3)
POTASSIUM SERPL-MCNC: 4.5 MMOL/L — SIGNIFICANT CHANGE UP (ref 3.5–5.3)
POTASSIUM SERPL-MCNC: 4.6 MMOL/L — SIGNIFICANT CHANGE UP (ref 3.5–5.3)
POTASSIUM SERPL-MCNC: 4.6 MMOL/L — SIGNIFICANT CHANGE UP (ref 3.5–5.3)
POTASSIUM SERPL-MCNC: 4.7 MMOL/L — SIGNIFICANT CHANGE UP (ref 3.5–5.3)
POTASSIUM SERPL-MCNC: 4.7 MMOL/L — SIGNIFICANT CHANGE UP (ref 3.5–5.3)
POTASSIUM SERPL-MCNC: 4.8 MMOL/L — SIGNIFICANT CHANGE UP (ref 3.5–5.3)
POTASSIUM SERPL-MCNC: 4.9 MMOL/L — SIGNIFICANT CHANGE UP (ref 3.5–5.3)
POTASSIUM SERPL-MCNC: 4.9 MMOL/L — SIGNIFICANT CHANGE UP (ref 3.5–5.3)
POTASSIUM SERPL-MCNC: 5 MMOL/L — SIGNIFICANT CHANGE UP (ref 3.5–5.3)
POTASSIUM SERPL-MCNC: 5.1 MMOL/L — SIGNIFICANT CHANGE UP (ref 3.5–5.3)
POTASSIUM SERPL-MCNC: 5.2 MMOL/L — SIGNIFICANT CHANGE UP (ref 3.5–5.3)
POTASSIUM SERPL-MCNC: 5.3 MMOL/L — SIGNIFICANT CHANGE UP (ref 3.5–5.3)
POTASSIUM SERPL-MCNC: 5.5 MMOL/L — HIGH (ref 3.5–5.3)
POTASSIUM SERPL-MCNC: 5.5 MMOL/L — HIGH (ref 3.5–5.3)
POTASSIUM SERPL-MCNC: 5.6 MMOL/L — HIGH (ref 3.5–5.3)
POTASSIUM SERPL-MCNC: 5.7 MMOL/L — HIGH (ref 3.5–5.3)
POTASSIUM SERPL-MCNC: 6 MMOL/L — HIGH (ref 3.5–5.3)
POTASSIUM SERPL-MCNC: 6.1 MMOL/L — HIGH (ref 3.5–5.3)
POTASSIUM SERPL-SCNC: 3.6 MMOL/L — SIGNIFICANT CHANGE UP (ref 3.5–5.3)
POTASSIUM SERPL-SCNC: 3.7 MMOL/L
POTASSIUM SERPL-SCNC: 3.7 MMOL/L — SIGNIFICANT CHANGE UP (ref 3.5–5.3)
POTASSIUM SERPL-SCNC: 3.8 MMOL/L — SIGNIFICANT CHANGE UP (ref 3.5–5.3)
POTASSIUM SERPL-SCNC: 3.9 MMOL/L — SIGNIFICANT CHANGE UP (ref 3.5–5.3)
POTASSIUM SERPL-SCNC: 4 MMOL/L — SIGNIFICANT CHANGE UP (ref 3.5–5.3)
POTASSIUM SERPL-SCNC: 4.1 MMOL/L — SIGNIFICANT CHANGE UP (ref 3.5–5.3)
POTASSIUM SERPL-SCNC: 4.2 MMOL/L — SIGNIFICANT CHANGE UP (ref 3.5–5.3)
POTASSIUM SERPL-SCNC: 4.3 MMOL/L — SIGNIFICANT CHANGE UP (ref 3.5–5.3)
POTASSIUM SERPL-SCNC: 4.4 MMOL/L — SIGNIFICANT CHANGE UP (ref 3.5–5.3)
POTASSIUM SERPL-SCNC: 4.5 MMOL/L — SIGNIFICANT CHANGE UP (ref 3.5–5.3)
POTASSIUM SERPL-SCNC: 4.6 MMOL/L — SIGNIFICANT CHANGE UP (ref 3.5–5.3)
POTASSIUM SERPL-SCNC: 4.6 MMOL/L — SIGNIFICANT CHANGE UP (ref 3.5–5.3)
POTASSIUM SERPL-SCNC: 4.7 MMOL/L — SIGNIFICANT CHANGE UP (ref 3.5–5.3)
POTASSIUM SERPL-SCNC: 4.7 MMOL/L — SIGNIFICANT CHANGE UP (ref 3.5–5.3)
POTASSIUM SERPL-SCNC: 4.8 MMOL/L — SIGNIFICANT CHANGE UP (ref 3.5–5.3)
POTASSIUM SERPL-SCNC: 4.9 MMOL/L — SIGNIFICANT CHANGE UP (ref 3.5–5.3)
POTASSIUM SERPL-SCNC: 4.9 MMOL/L — SIGNIFICANT CHANGE UP (ref 3.5–5.3)
POTASSIUM SERPL-SCNC: 5 MMOL/L — SIGNIFICANT CHANGE UP (ref 3.5–5.3)
POTASSIUM SERPL-SCNC: 5.1 MMOL/L — SIGNIFICANT CHANGE UP (ref 3.5–5.3)
POTASSIUM SERPL-SCNC: 5.2 MMOL/L — SIGNIFICANT CHANGE UP (ref 3.5–5.3)
POTASSIUM SERPL-SCNC: 5.3 MMOL/L — SIGNIFICANT CHANGE UP (ref 3.5–5.3)
POTASSIUM SERPL-SCNC: 5.5 MMOL/L — HIGH (ref 3.5–5.3)
POTASSIUM SERPL-SCNC: 5.5 MMOL/L — HIGH (ref 3.5–5.3)
POTASSIUM SERPL-SCNC: 5.6 MMOL/L — HIGH (ref 3.5–5.3)
POTASSIUM SERPL-SCNC: 5.7 MMOL/L — HIGH (ref 3.5–5.3)
POTASSIUM SERPL-SCNC: 6 MMOL/L — HIGH (ref 3.5–5.3)
POTASSIUM SERPL-SCNC: 6.1 MMOL/L — HIGH (ref 3.5–5.3)
POTASSIUM UR-SCNC: 29 MMOL/L — SIGNIFICANT CHANGE UP
PROT ?TM UR-MCNC: 8 MG/DL — SIGNIFICANT CHANGE UP (ref 0–12)
PROT SERPL-MCNC: 4.9 G/DL — LOW (ref 6–8.3)
PROT SERPL-MCNC: 5.2 G/DL — LOW (ref 6–8.3)
PROT SERPL-MCNC: 5.3 G/DL — LOW (ref 6–8.3)
PROT SERPL-MCNC: 5.4 G/DL — LOW (ref 6–8.3)
PROT SERPL-MCNC: 5.4 G/DL — LOW (ref 6–8.3)
PROT SERPL-MCNC: 5.6 G/DL — LOW (ref 6–8.3)
PROT SERPL-MCNC: 5.7 G/DL — LOW (ref 6–8.3)
PROT SERPL-MCNC: 5.8 G/DL — LOW (ref 6–8.3)
PROT SERPL-MCNC: 5.9 G/DL — LOW (ref 6–8.3)
PROT SERPL-MCNC: 6 G/DL — SIGNIFICANT CHANGE UP (ref 6–8.3)
PROT SERPL-MCNC: 6.1 G/DL — SIGNIFICANT CHANGE UP (ref 6–8.3)
PROT SERPL-MCNC: 6.2 G/DL — SIGNIFICANT CHANGE UP (ref 6–8.3)
PROT SERPL-MCNC: 6.3 G/DL — SIGNIFICANT CHANGE UP (ref 6–8.3)
PROT SERPL-MCNC: 6.4 G/DL — SIGNIFICANT CHANGE UP (ref 6–8.3)
PROT SERPL-MCNC: 6.5 G/DL — SIGNIFICANT CHANGE UP (ref 6–8.3)
PROT SERPL-MCNC: 6.8 G/DL — SIGNIFICANT CHANGE UP (ref 6–8.3)
PROT SERPL-MCNC: 6.8 G/DL — SIGNIFICANT CHANGE UP (ref 6–8.3)
PROT SERPL-MCNC: 6.9 G/DL — SIGNIFICANT CHANGE UP (ref 6–8.3)
PROT SERPL-MCNC: 7 G/DL
PROT SERPL-MCNC: 7.1 G/DL — SIGNIFICANT CHANGE UP (ref 6–8.3)
PROT SERPL-MCNC: 7.2 G/DL — SIGNIFICANT CHANGE UP (ref 6–8.3)
PROT SERPL-MCNC: 7.4 G/DL — SIGNIFICANT CHANGE UP (ref 6–8.3)
PROT SERPL-MCNC: 7.4 G/DL — SIGNIFICANT CHANGE UP (ref 6–8.3)
PROT SERPL-MCNC: 7.7 G/DL — SIGNIFICANT CHANGE UP (ref 6–8.3)
PROT SERPL-MCNC: 7.7 G/DL — SIGNIFICANT CHANGE UP (ref 6–8.3)
PROT SERPL-MCNC: 7.8 G/DL — SIGNIFICANT CHANGE UP (ref 6–8.3)
PROT SERPL-MCNC: 8.1 G/DL — SIGNIFICANT CHANGE UP (ref 6–8.3)
PROT SERPL-MCNC: 8.1 G/DL — SIGNIFICANT CHANGE UP (ref 6–8.3)
PROT UR-MCNC: 100 MG/DL
PROT UR-MCNC: 30 MG/DL
PROT UR-MCNC: 30 MG/DL
PROT UR-MCNC: NEGATIVE MG/DL — SIGNIFICANT CHANGE UP
PROT/CREAT UR-RTO: 0.2 RATIO — SIGNIFICANT CHANGE UP (ref 0–0.2)
PROTHROM AB SERPL-ACNC: 10.1 SEC — SIGNIFICANT CHANGE UP (ref 9.5–13)
PROTHROM AB SERPL-ACNC: 11.7 SEC — SIGNIFICANT CHANGE UP (ref 9.5–13)
PROTHROM AB SERPL-ACNC: 11.8 SEC — SIGNIFICANT CHANGE UP (ref 9.5–13)
PROTHROM AB SERPL-ACNC: 11.8 SEC — SIGNIFICANT CHANGE UP (ref 9.5–13)
PROTHROM AB SERPL-ACNC: 12 SEC — SIGNIFICANT CHANGE UP (ref 9.5–13)
PROTHROM AB SERPL-ACNC: 12.4 SEC — SIGNIFICANT CHANGE UP (ref 9.5–13)
PROTHROM AB SERPL-ACNC: 13 SEC — SIGNIFICANT CHANGE UP (ref 9.5–13)
PROTHROM AB SERPL-ACNC: 15 SEC — HIGH (ref 9.5–13)
PROTHROM AB SERPL-ACNC: 15.6 SEC — HIGH (ref 9.5–13)
PROTHROM AB SERPL-ACNC: 15.8 SEC — HIGH (ref 9.5–13)
PROTHROM AB SERPL-ACNC: 17 SEC — HIGH (ref 9.5–13)
PROTHROM AB SERPL-ACNC: 22.7 SEC — HIGH (ref 9.5–13)
PROTHROM AB SERPL-ACNC: 27.6 SEC — HIGH (ref 9.5–13)
PROTHROM AB SERPL-ACNC: 27.6 SEC — HIGH (ref 9.5–13)
PROTHROM AB SERPL-ACNC: 28.8 SEC — HIGH (ref 9.5–13)
PT 100%: 15.1 SEC — HIGH (ref 9.5–13)
PT 50/50: 11.8 SEC — SIGNIFICANT CHANGE UP (ref 9.5–14)
PTH-INTACT FLD-MCNC: 109 PG/ML — HIGH (ref 15–65)
QUANT TB PLUS MITOGEN MINUS NIL: 0.05 IU/ML — SIGNIFICANT CHANGE UP
RAPID RVP RESULT: SIGNIFICANT CHANGE UP
RAPID RVP RESULT: SIGNIFICANT CHANGE UP
RBC # BLD: 2.54 M/UL — LOW (ref 3.8–5.2)
RBC # BLD: 2.55 M/UL — LOW (ref 3.8–5.2)
RBC # BLD: 2.6 M/UL — LOW (ref 3.8–5.2)
RBC # BLD: 2.64 M/UL — LOW (ref 3.8–5.2)
RBC # BLD: 2.7 M/UL — LOW (ref 3.8–5.2)
RBC # BLD: 2.75 M/UL — LOW (ref 3.8–5.2)
RBC # BLD: 2.78 M/UL — LOW (ref 3.8–5.2)
RBC # BLD: 2.8 M/UL — LOW (ref 3.8–5.2)
RBC # BLD: 2.82 M/UL — LOW (ref 3.8–5.2)
RBC # BLD: 2.83 M/UL — LOW (ref 3.8–5.2)
RBC # BLD: 2.83 M/UL — LOW (ref 3.8–5.2)
RBC # BLD: 2.84 M/UL — LOW (ref 3.8–5.2)
RBC # BLD: 2.85 M/UL — LOW (ref 3.8–5.2)
RBC # BLD: 2.85 M/UL — LOW (ref 3.8–5.2)
RBC # BLD: 2.86 M/UL — LOW (ref 3.8–5.2)
RBC # BLD: 2.86 M/UL — LOW (ref 3.8–5.2)
RBC # BLD: 2.89 M/UL — LOW (ref 3.8–5.2)
RBC # BLD: 2.92 M/UL — LOW (ref 3.8–5.2)
RBC # BLD: 3.01 M/UL — LOW (ref 3.8–5.2)
RBC # BLD: 3.01 M/UL — LOW (ref 3.8–5.2)
RBC # BLD: 3.07 M/UL — LOW (ref 3.8–5.2)
RBC # BLD: 3.09 M/UL — LOW (ref 3.8–5.2)
RBC # BLD: 3.1 M/UL — LOW (ref 3.8–5.2)
RBC # BLD: 3.15 M/UL — LOW (ref 3.8–5.2)
RBC # BLD: 3.18 M/UL — LOW (ref 3.8–5.2)
RBC # BLD: 3.18 M/UL — LOW (ref 3.8–5.2)
RBC # BLD: 3.2 M/UL — LOW (ref 3.8–5.2)
RBC # BLD: 3.21 M/UL — LOW (ref 3.8–5.2)
RBC # BLD: 3.22 M/UL — LOW (ref 3.8–5.2)
RBC # BLD: 3.23 M/UL — LOW (ref 3.8–5.2)
RBC # BLD: 3.24 M/UL — LOW (ref 3.8–5.2)
RBC # BLD: 3.25 M/UL — LOW (ref 3.8–5.2)
RBC # BLD: 3.3 M/UL — LOW (ref 3.8–5.2)
RBC # BLD: 3.31 M/UL — LOW (ref 3.8–5.2)
RBC # BLD: 3.32 M/UL — LOW (ref 3.8–5.2)
RBC # BLD: 3.33 M/UL — LOW (ref 3.8–5.2)
RBC # BLD: 3.36 M/UL — LOW (ref 3.8–5.2)
RBC # BLD: 3.39 M/UL — LOW (ref 3.8–5.2)
RBC # BLD: 3.41 M/UL — LOW (ref 3.8–5.2)
RBC # BLD: 3.42 M/UL — LOW (ref 3.8–5.2)
RBC # BLD: 3.46 M/UL — LOW (ref 3.8–5.2)
RBC # BLD: 3.46 M/UL — LOW (ref 3.8–5.2)
RBC # BLD: 3.48 M/UL — LOW (ref 3.8–5.2)
RBC # BLD: 3.49 M/UL — LOW (ref 3.8–5.2)
RBC # BLD: 3.53 M/UL — LOW (ref 3.8–5.2)
RBC # BLD: 3.55 M/UL — LOW (ref 3.8–5.2)
RBC # BLD: 3.57 M/UL — LOW (ref 3.8–5.2)
RBC # BLD: 3.59 M/UL — LOW (ref 3.8–5.2)
RBC # BLD: 3.59 M/UL — LOW (ref 3.8–5.2)
RBC # BLD: 3.62 M/UL — LOW (ref 3.8–5.2)
RBC # BLD: 3.65 M/UL — LOW (ref 3.8–5.2)
RBC # BLD: 3.66 M/UL — LOW (ref 3.8–5.2)
RBC # BLD: 3.66 M/UL — LOW (ref 3.8–5.2)
RBC # BLD: 3.67 M/UL — LOW (ref 3.8–5.2)
RBC # BLD: 3.67 M/UL — LOW (ref 3.8–5.2)
RBC # BLD: 3.69 M/UL — LOW (ref 3.8–5.2)
RBC # BLD: 3.69 M/UL — LOW (ref 3.8–5.2)
RBC # BLD: 3.71 M/UL — LOW (ref 3.8–5.2)
RBC # BLD: 3.72 M/UL — LOW (ref 3.8–5.2)
RBC # BLD: 3.73 M/UL — LOW (ref 3.8–5.2)
RBC # BLD: 3.77 M/UL
RBC # BLD: 3.77 M/UL — LOW (ref 3.8–5.2)
RBC # BLD: 3.77 M/UL — LOW (ref 3.8–5.2)
RBC # BLD: 3.78 M/UL — LOW (ref 3.8–5.2)
RBC # BLD: 3.79 M/UL — LOW (ref 3.8–5.2)
RBC # BLD: 3.8 M/UL — SIGNIFICANT CHANGE UP (ref 3.8–5.2)
RBC # BLD: 3.98 M/UL — SIGNIFICANT CHANGE UP (ref 3.8–5.2)
RBC # BLD: 4.04 M/UL — SIGNIFICANT CHANGE UP (ref 3.8–5.2)
RBC # BLD: 4.05 M/UL — SIGNIFICANT CHANGE UP (ref 3.8–5.2)
RBC # BLD: 4.06 M/UL — SIGNIFICANT CHANGE UP (ref 3.8–5.2)
RBC # BLD: 4.07 M/UL — SIGNIFICANT CHANGE UP (ref 3.8–5.2)
RBC # BLD: 4.08 M/UL — SIGNIFICANT CHANGE UP (ref 3.8–5.2)
RBC # BLD: 4.1 M/UL — SIGNIFICANT CHANGE UP (ref 3.8–5.2)
RBC # BLD: 4.13 M/UL — SIGNIFICANT CHANGE UP (ref 3.8–5.2)
RBC # BLD: 4.15 M/UL — SIGNIFICANT CHANGE UP (ref 3.8–5.2)
RBC # BLD: 4.15 M/UL — SIGNIFICANT CHANGE UP (ref 3.8–5.2)
RBC # BLD: 4.18 M/UL — SIGNIFICANT CHANGE UP (ref 3.8–5.2)
RBC # BLD: 4.26 M/UL — SIGNIFICANT CHANGE UP (ref 3.8–5.2)
RBC # BLD: 4.26 M/UL — SIGNIFICANT CHANGE UP (ref 3.8–5.2)
RBC # BLD: 4.28 M/UL — SIGNIFICANT CHANGE UP (ref 3.8–5.2)
RBC # BLD: 4.29 M/UL — SIGNIFICANT CHANGE UP (ref 3.8–5.2)
RBC # BLD: 4.4 M/UL — SIGNIFICANT CHANGE UP (ref 3.8–5.2)
RBC # BLD: 4.4 M/UL — SIGNIFICANT CHANGE UP (ref 3.8–5.2)
RBC # BLD: 4.7 M/UL — SIGNIFICANT CHANGE UP (ref 3.8–5.2)
RBC # FLD: 14.8 % — HIGH (ref 10.3–14.5)
RBC # FLD: 14.8 % — HIGH (ref 10.3–14.5)
RBC # FLD: 15.1 % — HIGH (ref 10.3–14.5)
RBC # FLD: 15.1 % — HIGH (ref 10.3–14.5)
RBC # FLD: 15.2 % — HIGH (ref 10.3–14.5)
RBC # FLD: 15.2 % — HIGH (ref 10.3–14.5)
RBC # FLD: 15.3 % — HIGH (ref 10.3–14.5)
RBC # FLD: 15.3 % — HIGH (ref 10.3–14.5)
RBC # FLD: 15.4 % — HIGH (ref 10.3–14.5)
RBC # FLD: 15.6 %
RBC # FLD: 16.1 % — HIGH (ref 10.3–14.5)
RBC # FLD: 16.2 % — HIGH (ref 10.3–14.5)
RBC # FLD: 16.2 % — HIGH (ref 10.3–14.5)
RBC # FLD: 16.3 % — HIGH (ref 10.3–14.5)
RBC # FLD: 16.4 % — HIGH (ref 10.3–14.5)
RBC # FLD: 16.5 % — HIGH (ref 10.3–14.5)
RBC # FLD: 16.6 % — HIGH (ref 10.3–14.5)
RBC # FLD: 16.7 % — HIGH (ref 10.3–14.5)
RBC # FLD: 16.8 % — HIGH (ref 10.3–14.5)
RBC # FLD: 16.9 % — HIGH (ref 10.3–14.5)
RBC # FLD: 16.9 % — HIGH (ref 10.3–14.5)
RBC # FLD: 17 % — HIGH (ref 10.3–14.5)
RBC # FLD: 17 % — HIGH (ref 10.3–14.5)
RBC # FLD: 17.1 % — HIGH (ref 10.3–14.5)
RBC # FLD: 17.2 % — HIGH (ref 10.3–14.5)
RBC # FLD: 17.3 % — HIGH (ref 10.3–14.5)
RBC # FLD: 17.3 % — HIGH (ref 10.3–14.5)
RBC # FLD: 17.4 % — HIGH (ref 10.3–14.5)
RBC # FLD: 17.5 % — HIGH (ref 10.3–14.5)
RBC # FLD: 17.7 % — HIGH (ref 10.3–14.5)
RBC # FLD: 17.8 % — HIGH (ref 10.3–14.5)
RBC # FLD: 17.8 % — HIGH (ref 10.3–14.5)
RBC # FLD: 17.9 % — HIGH (ref 10.3–14.5)
RBC # FLD: 17.9 % — HIGH (ref 10.3–14.5)
RBC # FLD: 18.2 % — HIGH (ref 10.3–14.5)
RBC # FLD: 18.2 % — HIGH (ref 10.3–14.5)
RBC # FLD: 18.7 % — HIGH (ref 10.3–14.5)
RBC # FLD: 18.9 % — HIGH (ref 10.3–14.5)
RBC # FLD: 19 % — HIGH (ref 10.3–14.5)
RBC # FLD: 19.2 % — HIGH (ref 10.3–14.5)
RBC # FLD: 19.2 % — HIGH (ref 10.3–14.5)
RBC # FLD: 19.3 % — HIGH (ref 10.3–14.5)
RBC # FLD: 19.4 % — HIGH (ref 10.3–14.5)
RBC # FLD: 19.5 % — HIGH (ref 10.3–14.5)
RBC # FLD: 19.5 % — HIGH (ref 10.3–14.5)
RBC # FLD: 19.6 % — HIGH (ref 10.3–14.5)
RBC # FLD: 19.6 % — HIGH (ref 10.3–14.5)
RBC # FLD: 19.8 % — HIGH (ref 10.3–14.5)
RBC # FLD: 20 % — HIGH (ref 10.3–14.5)
RBC # FLD: 20.3 % — HIGH (ref 10.3–14.5)
RBC # FLD: 20.3 % — HIGH (ref 10.3–14.5)
RBC # FLD: 20.4 % — HIGH (ref 10.3–14.5)
RBC # FLD: 20.4 % — HIGH (ref 10.3–14.5)
RBC # FLD: 20.5 % — HIGH (ref 10.3–14.5)
RBC # FLD: 20.7 % — HIGH (ref 10.3–14.5)
RBC # FLD: 20.7 % — HIGH (ref 10.3–14.5)
RBC # FLD: 20.8 % — HIGH (ref 10.3–14.5)
RBC # FLD: 20.9 % — HIGH (ref 10.3–14.5)
RBC # FLD: 21.1 % — HIGH (ref 10.3–14.5)
RBC BLD AUTO: ABNORMAL
RBC BLD AUTO: ABNORMAL
RBC BLD AUTO: SIGNIFICANT CHANGE UP
RBC BLD AUTO: SIGNIFICANT CHANGE UP
RBC CASTS # UR COMP ASSIST: 0 /HPF — SIGNIFICANT CHANGE UP (ref 0–4)
RBC CASTS # UR COMP ASSIST: 15 /HPF — HIGH (ref 0–4)
RBC CASTS # UR COMP ASSIST: 5 /HPF — HIGH (ref 0–4)
RBC CASTS # UR COMP ASSIST: 64 /HPF — HIGH (ref 0–4)
RENIN PLAS-CCNC: 9.78 NG/ML/HR — HIGH (ref 0.17–5.38)
RETICS #: 36 K/UL — SIGNIFICANT CHANGE UP (ref 25–125)
RETICS/RBC NFR: 1.3 % — SIGNIFICANT CHANGE UP (ref 0.5–2.5)
REVIEW: SIGNIFICANT CHANGE UP
REVIEW: SIGNIFICANT CHANGE UP
RH IG SCN BLD-IMP: POSITIVE — SIGNIFICANT CHANGE UP
RSV RNA NPH QL NAA+NON-PROBE: SIGNIFICANT CHANGE UP
S AUREUS DNA NOSE QL NAA+PROBE: DETECTED
SAO2 % BLDA: 98.3 % — HIGH (ref 94–98)
SAO2 % BLDA: 98.5 % — HIGH (ref 94–98)
SAO2 % BLDA: 98.6 % — HIGH (ref 94–98)
SAO2 % BLDA: 98.9 % — HIGH (ref 94–98)
SAO2 % BLDA: 99.1 % — HIGH (ref 94–98)
SAO2 % BLDA: 99.2 % — HIGH (ref 94–98)
SAO2 % BLDA: 99.2 % — HIGH (ref 94–98)
SAO2 % BLDA: 99.5 % — HIGH (ref 94–98)
SAO2 % BLDMV: 68 % — HIGH (ref 18–22)
SAO2 % BLDV: 54.3 % — LOW (ref 67–88)
SAO2 % BLDV: 62.2 % — LOW (ref 67–88)
SAO2 % BLDV: 66.1 % — LOW (ref 67–88)
SAO2 % BLDV: 69.3 % — SIGNIFICANT CHANGE UP (ref 67–88)
SAO2 % BLDV: 75.6 % — SIGNIFICANT CHANGE UP (ref 67–88)
SAO2 % BLDV: 76 % — SIGNIFICANT CHANGE UP (ref 67–88)
SARS-COV-2 RNA SPEC QL NAA+PROBE: SIGNIFICANT CHANGE UP
SCHISTOCYTES BLD QL AUTO: SLIGHT — SIGNIFICANT CHANGE UP
SODIUM SERPL-SCNC: 128 MMOL/L — LOW (ref 135–145)
SODIUM SERPL-SCNC: 130 MMOL/L — LOW (ref 135–145)
SODIUM SERPL-SCNC: 131 MMOL/L — LOW (ref 135–145)
SODIUM SERPL-SCNC: 132 MMOL/L — LOW (ref 135–145)
SODIUM SERPL-SCNC: 133 MMOL/L — LOW (ref 135–145)
SODIUM SERPL-SCNC: 134 MMOL/L
SODIUM SERPL-SCNC: 134 MMOL/L — LOW (ref 135–145)
SODIUM SERPL-SCNC: 135 MMOL/L — SIGNIFICANT CHANGE UP (ref 135–145)
SODIUM SERPL-SCNC: 136 MMOL/L — SIGNIFICANT CHANGE UP (ref 135–145)
SODIUM SERPL-SCNC: 137 MMOL/L — SIGNIFICANT CHANGE UP (ref 135–145)
SODIUM SERPL-SCNC: 138 MMOL/L — SIGNIFICANT CHANGE UP (ref 135–145)
SODIUM SERPL-SCNC: 139 MMOL/L — SIGNIFICANT CHANGE UP (ref 135–145)
SODIUM SERPL-SCNC: 139 MMOL/L — SIGNIFICANT CHANGE UP (ref 135–145)
SODIUM SERPL-SCNC: 140 MMOL/L — SIGNIFICANT CHANGE UP (ref 135–145)
SODIUM SERPL-SCNC: 140 MMOL/L — SIGNIFICANT CHANGE UP (ref 135–145)
SODIUM SERPL-SCNC: 141 MMOL/L — SIGNIFICANT CHANGE UP (ref 135–145)
SODIUM SERPL-SCNC: 142 MMOL/L — SIGNIFICANT CHANGE UP (ref 135–145)
SODIUM SERPL-SCNC: 143 MMOL/L — SIGNIFICANT CHANGE UP (ref 135–145)
SODIUM SERPL-SCNC: 145 MMOL/L — SIGNIFICANT CHANGE UP (ref 135–145)
SODIUM UR-SCNC: 27 MMOL/L — SIGNIFICANT CHANGE UP
SP GR SPEC: 1.01 — SIGNIFICANT CHANGE UP (ref 1–1.03)
SP GR SPEC: 1.02 — SIGNIFICANT CHANGE UP (ref 1–1.03)
SPECIMEN SOURCE: SIGNIFICANT CHANGE UP
SQUAMOUS # UR AUTO: 0 /HPF — SIGNIFICANT CHANGE UP (ref 0–5)
SQUAMOUS # UR AUTO: 22 /HPF — HIGH (ref 0–5)
SQUAMOUS # UR AUTO: >36 /HPF — HIGH (ref 0–5)
SURGICAL PATHOLOGY STUDY: SIGNIFICANT CHANGE UP
T4 FREE SERPL-MCNC: 1 NG/DL — SIGNIFICANT CHANGE UP (ref 0.9–1.8)
T4 FREE SERPL-MCNC: 1.3 NG/DL — SIGNIFICANT CHANGE UP (ref 0.9–1.8)
T4 FREE SERPL-MCNC: 1.5 NG/DL — SIGNIFICANT CHANGE UP (ref 0.9–1.8)
THROMBIN TIME: 24 SEC — SIGNIFICANT CHANGE UP (ref 16–25)
TOTAL VOLUME - 24 HOUR: 200 ML — SIGNIFICANT CHANGE UP
TOTAL VOLUME - 24 HOUR: 2750 ML — SIGNIFICANT CHANGE UP
TRIGL SERPL-MCNC: 70 MG/DL — SIGNIFICANT CHANGE UP
TROPONIN I, HIGH SENSITIVITY RESULT: 22.2 NG/L — SIGNIFICANT CHANGE UP
TROPONIN I, HIGH SENSITIVITY RESULT: 2783.7 NG/L — HIGH
TROPONIN I, HIGH SENSITIVITY RESULT: 3568.6 NG/L — HIGH
TROPONIN I, HIGH SENSITIVITY RESULT: 3608.9 NG/L — HIGH
TROPONIN I, HIGH SENSITIVITY RESULT: 3916.1 NG/L — HIGH
TROPONIN I, HIGH SENSITIVITY RESULT: 4174.2 NG/L — HIGH
TROPONIN I, HIGH SENSITIVITY RESULT: 5891.7 NG/L — HIGH
TROPONIN I, HIGH SENSITIVITY RESULT: 8056.2 NG/L — HIGH
TROPONIN I, HIGH SENSITIVITY RESULT: 9813.9 NG/L — HIGH
TROPONIN I, HIGH SENSITIVITY RESULT: HIGH NG/L
TROPONIN I, HIGH SENSITIVITY RESULT: HIGH NG/L
TROPONIN T, HIGH SENSITIVITY RESULT: 25 NG/L — SIGNIFICANT CHANGE UP (ref 0–51)
TROPONIN T, HIGH SENSITIVITY RESULT: 28 NG/L — SIGNIFICANT CHANGE UP (ref 0–51)
TRYPTASE SERPL-MCNC: 10.8 UG/L — HIGH
TRYPTASE SERPL-MCNC: 11.9 UG/L — HIGH
TRYPTASE SERPL-MCNC: 9.3 UG/L — HIGH
TSH SERPL-MCNC: 10.2 UIU/ML — HIGH (ref 0.27–4.2)
TSH SERPL-MCNC: 4.79 UIU/ML — HIGH (ref 0.27–4.2)
TSH SERPL-MCNC: 5.06 UIU/ML — HIGH (ref 0.27–4.2)
UNFRACTIONATED HEPARIN INTERPRETATION: SIGNIFICANT CHANGE UP
UNFRACTIONATED HEPARIN RESULT: NEGATIVE — SIGNIFICANT CHANGE UP
UNFRACTIONATED HEPARIN-HIGH DOSE: 0 % — SIGNIFICANT CHANGE UP
UNFRACTIONATED HEPARIN-LOW DOSE: 0 % — SIGNIFICANT CHANGE UP
URINE CREATININE CALCULATION: 0.4 G/24 H — LOW (ref 0.8–1.8)
URINE CREATININE CALCULATION: 1 G/24 H — SIGNIFICANT CHANGE UP (ref 0.8–1.8)
UROBILINOGEN FLD QL: 0.2 MG/DL — SIGNIFICANT CHANGE UP (ref 0.2–1)
UROBILINOGEN FLD QL: 1 MG/DL — SIGNIFICANT CHANGE UP (ref 0.2–1)
UUN UR-MCNC: 243 MG/DL — SIGNIFICANT CHANGE UP
VANCOMYCIN FLD-MCNC: 13.1 UG/ML — SIGNIFICANT CHANGE UP
VANCOMYCIN FLD-MCNC: 18.4 UG/ML — SIGNIFICANT CHANGE UP
VANCOMYCIN FLD-MCNC: 25.9 UG/ML
VANCOMYCIN FLD-MCNC: 4.9 UG/ML — SIGNIFICANT CHANGE UP
VANCOMYCIN FLD-MCNC: 5.9 UG/ML — SIGNIFICANT CHANGE UP
VANCOMYCIN TROUGH SERPL-MCNC: 11.7 UG/ML — SIGNIFICANT CHANGE UP (ref 10–20)
VANCOMYCIN TROUGH SERPL-MCNC: 21.4 UG/ML — HIGH (ref 10–20)
VIT B12 SERPL-MCNC: >2000 PG/ML — HIGH (ref 232–1245)
WBC # BLD: 10.09 K/UL — SIGNIFICANT CHANGE UP (ref 3.8–10.5)
WBC # BLD: 10.34 K/UL — SIGNIFICANT CHANGE UP (ref 3.8–10.5)
WBC # BLD: 10.36 K/UL — SIGNIFICANT CHANGE UP (ref 3.8–10.5)
WBC # BLD: 10.36 K/UL — SIGNIFICANT CHANGE UP (ref 3.8–10.5)
WBC # BLD: 10.42 K/UL — SIGNIFICANT CHANGE UP (ref 3.8–10.5)
WBC # BLD: 10.45 K/UL — SIGNIFICANT CHANGE UP (ref 3.8–10.5)
WBC # BLD: 10.53 K/UL — HIGH (ref 3.8–10.5)
WBC # BLD: 10.61 K/UL — HIGH (ref 3.8–10.5)
WBC # BLD: 10.61 K/UL — HIGH (ref 3.8–10.5)
WBC # BLD: 11.2 K/UL — HIGH (ref 3.8–10.5)
WBC # BLD: 11.39 K/UL — HIGH (ref 3.8–10.5)
WBC # BLD: 12.18 K/UL — HIGH (ref 3.8–10.5)
WBC # BLD: 12.22 K/UL — HIGH (ref 3.8–10.5)
WBC # BLD: 12.9 K/UL — HIGH (ref 3.8–10.5)
WBC # BLD: 13.06 K/UL — HIGH (ref 3.8–10.5)
WBC # BLD: 13.41 K/UL — HIGH (ref 3.8–10.5)
WBC # BLD: 13.46 K/UL — HIGH (ref 3.8–10.5)
WBC # BLD: 13.99 K/UL — HIGH (ref 3.8–10.5)
WBC # BLD: 14.11 K/UL — HIGH (ref 3.8–10.5)
WBC # BLD: 14.39 K/UL — HIGH (ref 3.8–10.5)
WBC # BLD: 14.39 K/UL — HIGH (ref 3.8–10.5)
WBC # BLD: 14.42 K/UL — HIGH (ref 3.8–10.5)
WBC # BLD: 14.94 K/UL — HIGH (ref 3.8–10.5)
WBC # BLD: 15.35 K/UL — HIGH (ref 3.8–10.5)
WBC # BLD: 17.21 K/UL — HIGH (ref 3.8–10.5)
WBC # BLD: 17.42 K/UL — HIGH (ref 3.8–10.5)
WBC # BLD: 17.43 K/UL — HIGH (ref 3.8–10.5)
WBC # BLD: 17.72 K/UL — HIGH (ref 3.8–10.5)
WBC # BLD: 18.67 K/UL — HIGH (ref 3.8–10.5)
WBC # BLD: 19.62 K/UL — HIGH (ref 3.8–10.5)
WBC # BLD: 20.62 K/UL — HIGH (ref 3.8–10.5)
WBC # BLD: 21.44 K/UL — HIGH (ref 3.8–10.5)
WBC # BLD: 23.63 K/UL — HIGH (ref 3.8–10.5)
WBC # BLD: 24.72 K/UL — HIGH (ref 3.8–10.5)
WBC # BLD: 25.51 K/UL — HIGH (ref 3.8–10.5)
WBC # BLD: 31.11 K/UL — HIGH (ref 3.8–10.5)
WBC # BLD: 4.89 K/UL — SIGNIFICANT CHANGE UP (ref 3.8–10.5)
WBC # BLD: 5.03 K/UL — SIGNIFICANT CHANGE UP (ref 3.8–10.5)
WBC # BLD: 5.22 K/UL — SIGNIFICANT CHANGE UP (ref 3.8–10.5)
WBC # BLD: 5.48 K/UL — SIGNIFICANT CHANGE UP (ref 3.8–10.5)
WBC # BLD: 5.58 K/UL — SIGNIFICANT CHANGE UP (ref 3.8–10.5)
WBC # BLD: 5.74 K/UL — SIGNIFICANT CHANGE UP (ref 3.8–10.5)
WBC # BLD: 5.76 K/UL — SIGNIFICANT CHANGE UP (ref 3.8–10.5)
WBC # BLD: 5.98 K/UL — SIGNIFICANT CHANGE UP (ref 3.8–10.5)
WBC # BLD: 6.29 K/UL — SIGNIFICANT CHANGE UP (ref 3.8–10.5)
WBC # BLD: 6.64 K/UL — SIGNIFICANT CHANGE UP (ref 3.8–10.5)
WBC # BLD: 6.8 K/UL — SIGNIFICANT CHANGE UP (ref 3.8–10.5)
WBC # BLD: 6.86 K/UL — SIGNIFICANT CHANGE UP (ref 3.8–10.5)
WBC # BLD: 6.88 K/UL — SIGNIFICANT CHANGE UP (ref 3.8–10.5)
WBC # BLD: 6.97 K/UL — SIGNIFICANT CHANGE UP (ref 3.8–10.5)
WBC # BLD: 7.02 K/UL — SIGNIFICANT CHANGE UP (ref 3.8–10.5)
WBC # BLD: 7.12 K/UL — SIGNIFICANT CHANGE UP (ref 3.8–10.5)
WBC # BLD: 7.25 K/UL — SIGNIFICANT CHANGE UP (ref 3.8–10.5)
WBC # BLD: 7.37 K/UL — SIGNIFICANT CHANGE UP (ref 3.8–10.5)
WBC # BLD: 7.69 K/UL — SIGNIFICANT CHANGE UP (ref 3.8–10.5)
WBC # BLD: 7.76 K/UL — SIGNIFICANT CHANGE UP (ref 3.8–10.5)
WBC # BLD: 7.8 K/UL — SIGNIFICANT CHANGE UP (ref 3.8–10.5)
WBC # BLD: 7.84 K/UL — SIGNIFICANT CHANGE UP (ref 3.8–10.5)
WBC # BLD: 7.98 K/UL — SIGNIFICANT CHANGE UP (ref 3.8–10.5)
WBC # BLD: 8.02 K/UL — SIGNIFICANT CHANGE UP (ref 3.8–10.5)
WBC # BLD: 8.09 K/UL — SIGNIFICANT CHANGE UP (ref 3.8–10.5)
WBC # BLD: 8.11 K/UL — SIGNIFICANT CHANGE UP (ref 3.8–10.5)
WBC # BLD: 8.15 K/UL — SIGNIFICANT CHANGE UP (ref 3.8–10.5)
WBC # BLD: 8.15 K/UL — SIGNIFICANT CHANGE UP (ref 3.8–10.5)
WBC # BLD: 8.23 K/UL — SIGNIFICANT CHANGE UP (ref 3.8–10.5)
WBC # BLD: 8.42 K/UL — SIGNIFICANT CHANGE UP (ref 3.8–10.5)
WBC # BLD: 8.43 K/UL — SIGNIFICANT CHANGE UP (ref 3.8–10.5)
WBC # BLD: 8.43 K/UL — SIGNIFICANT CHANGE UP (ref 3.8–10.5)
WBC # BLD: 8.51 K/UL — SIGNIFICANT CHANGE UP (ref 3.8–10.5)
WBC # BLD: 8.56 K/UL — SIGNIFICANT CHANGE UP (ref 3.8–10.5)
WBC # BLD: 8.66 K/UL — SIGNIFICANT CHANGE UP (ref 3.8–10.5)
WBC # BLD: 8.73 K/UL — SIGNIFICANT CHANGE UP (ref 3.8–10.5)
WBC # BLD: 8.77 K/UL — SIGNIFICANT CHANGE UP (ref 3.8–10.5)
WBC # BLD: 8.82 K/UL — SIGNIFICANT CHANGE UP (ref 3.8–10.5)
WBC # BLD: 8.83 K/UL — SIGNIFICANT CHANGE UP (ref 3.8–10.5)
WBC # BLD: 8.84 K/UL — SIGNIFICANT CHANGE UP (ref 3.8–10.5)
WBC # BLD: 9.07 K/UL — SIGNIFICANT CHANGE UP (ref 3.8–10.5)
WBC # BLD: 9.17 K/UL — SIGNIFICANT CHANGE UP (ref 3.8–10.5)
WBC # BLD: 9.22 K/UL — SIGNIFICANT CHANGE UP (ref 3.8–10.5)
WBC # BLD: 9.23 K/UL — SIGNIFICANT CHANGE UP (ref 3.8–10.5)
WBC # BLD: 9.3 K/UL — SIGNIFICANT CHANGE UP (ref 3.8–10.5)
WBC # BLD: 9.32 K/UL — SIGNIFICANT CHANGE UP (ref 3.8–10.5)
WBC # BLD: 9.37 K/UL — SIGNIFICANT CHANGE UP (ref 3.8–10.5)
WBC # BLD: 9.53 K/UL — SIGNIFICANT CHANGE UP (ref 3.8–10.5)
WBC # BLD: 9.53 K/UL — SIGNIFICANT CHANGE UP (ref 3.8–10.5)
WBC # BLD: 9.58 K/UL — SIGNIFICANT CHANGE UP (ref 3.8–10.5)
WBC # BLD: 9.61 K/UL — SIGNIFICANT CHANGE UP (ref 3.8–10.5)
WBC # BLD: 9.64 K/UL — SIGNIFICANT CHANGE UP (ref 3.8–10.5)
WBC # BLD: 9.67 K/UL — SIGNIFICANT CHANGE UP (ref 3.8–10.5)
WBC # BLD: 9.86 K/UL — SIGNIFICANT CHANGE UP (ref 3.8–10.5)
WBC # BLD: 9.86 K/UL — SIGNIFICANT CHANGE UP (ref 3.8–10.5)
WBC # BLD: 9.96 K/UL — SIGNIFICANT CHANGE UP (ref 3.8–10.5)
WBC # BLD: 9.97 K/UL — SIGNIFICANT CHANGE UP (ref 3.8–10.5)
WBC # FLD AUTO: 10.09 K/UL — SIGNIFICANT CHANGE UP (ref 3.8–10.5)
WBC # FLD AUTO: 10.34 K/UL — SIGNIFICANT CHANGE UP (ref 3.8–10.5)
WBC # FLD AUTO: 10.36 K/UL — SIGNIFICANT CHANGE UP (ref 3.8–10.5)
WBC # FLD AUTO: 10.36 K/UL — SIGNIFICANT CHANGE UP (ref 3.8–10.5)
WBC # FLD AUTO: 10.42 K/UL — SIGNIFICANT CHANGE UP (ref 3.8–10.5)
WBC # FLD AUTO: 10.45 K/UL — SIGNIFICANT CHANGE UP (ref 3.8–10.5)
WBC # FLD AUTO: 10.53 K/UL — HIGH (ref 3.8–10.5)
WBC # FLD AUTO: 10.61 K/UL — HIGH (ref 3.8–10.5)
WBC # FLD AUTO: 10.61 K/UL — HIGH (ref 3.8–10.5)
WBC # FLD AUTO: 11.2 K/UL — HIGH (ref 3.8–10.5)
WBC # FLD AUTO: 11.39 K/UL — HIGH (ref 3.8–10.5)
WBC # FLD AUTO: 12.18 K/UL — HIGH (ref 3.8–10.5)
WBC # FLD AUTO: 12.22 K/UL — HIGH (ref 3.8–10.5)
WBC # FLD AUTO: 12.9 K/UL — HIGH (ref 3.8–10.5)
WBC # FLD AUTO: 13.06 K/UL — HIGH (ref 3.8–10.5)
WBC # FLD AUTO: 13.41 K/UL — HIGH (ref 3.8–10.5)
WBC # FLD AUTO: 13.46 K/UL — HIGH (ref 3.8–10.5)
WBC # FLD AUTO: 13.99 K/UL — HIGH (ref 3.8–10.5)
WBC # FLD AUTO: 14.11 K/UL — HIGH (ref 3.8–10.5)
WBC # FLD AUTO: 14.39 K/UL — HIGH (ref 3.8–10.5)
WBC # FLD AUTO: 14.39 K/UL — HIGH (ref 3.8–10.5)
WBC # FLD AUTO: 14.42 K/UL — HIGH (ref 3.8–10.5)
WBC # FLD AUTO: 14.94 K/UL — HIGH (ref 3.8–10.5)
WBC # FLD AUTO: 15.35 K/UL — HIGH (ref 3.8–10.5)
WBC # FLD AUTO: 17.21 K/UL — HIGH (ref 3.8–10.5)
WBC # FLD AUTO: 17.42 K/UL — HIGH (ref 3.8–10.5)
WBC # FLD AUTO: 17.43 K/UL — HIGH (ref 3.8–10.5)
WBC # FLD AUTO: 17.72 K/UL — HIGH (ref 3.8–10.5)
WBC # FLD AUTO: 18.67 K/UL — HIGH (ref 3.8–10.5)
WBC # FLD AUTO: 19.62 K/UL — HIGH (ref 3.8–10.5)
WBC # FLD AUTO: 20.62 K/UL — HIGH (ref 3.8–10.5)
WBC # FLD AUTO: 21.44 K/UL — HIGH (ref 3.8–10.5)
WBC # FLD AUTO: 23.63 K/UL — HIGH (ref 3.8–10.5)
WBC # FLD AUTO: 24.72 K/UL — HIGH (ref 3.8–10.5)
WBC # FLD AUTO: 25.51 K/UL — HIGH (ref 3.8–10.5)
WBC # FLD AUTO: 31.11 K/UL — HIGH (ref 3.8–10.5)
WBC # FLD AUTO: 4.89 K/UL — SIGNIFICANT CHANGE UP (ref 3.8–10.5)
WBC # FLD AUTO: 5.03 K/UL — SIGNIFICANT CHANGE UP (ref 3.8–10.5)
WBC # FLD AUTO: 5.18 K/UL
WBC # FLD AUTO: 5.22 K/UL — SIGNIFICANT CHANGE UP (ref 3.8–10.5)
WBC # FLD AUTO: 5.48 K/UL — SIGNIFICANT CHANGE UP (ref 3.8–10.5)
WBC # FLD AUTO: 5.58 K/UL — SIGNIFICANT CHANGE UP (ref 3.8–10.5)
WBC # FLD AUTO: 5.74 K/UL — SIGNIFICANT CHANGE UP (ref 3.8–10.5)
WBC # FLD AUTO: 5.76 K/UL — SIGNIFICANT CHANGE UP (ref 3.8–10.5)
WBC # FLD AUTO: 5.98 K/UL — SIGNIFICANT CHANGE UP (ref 3.8–10.5)
WBC # FLD AUTO: 6.29 K/UL — SIGNIFICANT CHANGE UP (ref 3.8–10.5)
WBC # FLD AUTO: 6.64 K/UL — SIGNIFICANT CHANGE UP (ref 3.8–10.5)
WBC # FLD AUTO: 6.8 K/UL — SIGNIFICANT CHANGE UP (ref 3.8–10.5)
WBC # FLD AUTO: 6.86 K/UL — SIGNIFICANT CHANGE UP (ref 3.8–10.5)
WBC # FLD AUTO: 6.88 K/UL — SIGNIFICANT CHANGE UP (ref 3.8–10.5)
WBC # FLD AUTO: 6.97 K/UL — SIGNIFICANT CHANGE UP (ref 3.8–10.5)
WBC # FLD AUTO: 7.02 K/UL — SIGNIFICANT CHANGE UP (ref 3.8–10.5)
WBC # FLD AUTO: 7.12 K/UL — SIGNIFICANT CHANGE UP (ref 3.8–10.5)
WBC # FLD AUTO: 7.25 K/UL — SIGNIFICANT CHANGE UP (ref 3.8–10.5)
WBC # FLD AUTO: 7.37 K/UL — SIGNIFICANT CHANGE UP (ref 3.8–10.5)
WBC # FLD AUTO: 7.69 K/UL — SIGNIFICANT CHANGE UP (ref 3.8–10.5)
WBC # FLD AUTO: 7.76 K/UL — SIGNIFICANT CHANGE UP (ref 3.8–10.5)
WBC # FLD AUTO: 7.8 K/UL — SIGNIFICANT CHANGE UP (ref 3.8–10.5)
WBC # FLD AUTO: 7.84 K/UL — SIGNIFICANT CHANGE UP (ref 3.8–10.5)
WBC # FLD AUTO: 7.98 K/UL — SIGNIFICANT CHANGE UP (ref 3.8–10.5)
WBC # FLD AUTO: 8.02 K/UL — SIGNIFICANT CHANGE UP (ref 3.8–10.5)
WBC # FLD AUTO: 8.09 K/UL — SIGNIFICANT CHANGE UP (ref 3.8–10.5)
WBC # FLD AUTO: 8.11 K/UL — SIGNIFICANT CHANGE UP (ref 3.8–10.5)
WBC # FLD AUTO: 8.15 K/UL — SIGNIFICANT CHANGE UP (ref 3.8–10.5)
WBC # FLD AUTO: 8.15 K/UL — SIGNIFICANT CHANGE UP (ref 3.8–10.5)
WBC # FLD AUTO: 8.23 K/UL — SIGNIFICANT CHANGE UP (ref 3.8–10.5)
WBC # FLD AUTO: 8.42 K/UL — SIGNIFICANT CHANGE UP (ref 3.8–10.5)
WBC # FLD AUTO: 8.43 K/UL — SIGNIFICANT CHANGE UP (ref 3.8–10.5)
WBC # FLD AUTO: 8.43 K/UL — SIGNIFICANT CHANGE UP (ref 3.8–10.5)
WBC # FLD AUTO: 8.51 K/UL — SIGNIFICANT CHANGE UP (ref 3.8–10.5)
WBC # FLD AUTO: 8.56 K/UL — SIGNIFICANT CHANGE UP (ref 3.8–10.5)
WBC # FLD AUTO: 8.66 K/UL — SIGNIFICANT CHANGE UP (ref 3.8–10.5)
WBC # FLD AUTO: 8.73 K/UL — SIGNIFICANT CHANGE UP (ref 3.8–10.5)
WBC # FLD AUTO: 8.77 K/UL — SIGNIFICANT CHANGE UP (ref 3.8–10.5)
WBC # FLD AUTO: 8.82 K/UL — SIGNIFICANT CHANGE UP (ref 3.8–10.5)
WBC # FLD AUTO: 8.83 K/UL — SIGNIFICANT CHANGE UP (ref 3.8–10.5)
WBC # FLD AUTO: 8.84 K/UL — SIGNIFICANT CHANGE UP (ref 3.8–10.5)
WBC # FLD AUTO: 9.07 K/UL — SIGNIFICANT CHANGE UP (ref 3.8–10.5)
WBC # FLD AUTO: 9.17 K/UL — SIGNIFICANT CHANGE UP (ref 3.8–10.5)
WBC # FLD AUTO: 9.22 K/UL — SIGNIFICANT CHANGE UP (ref 3.8–10.5)
WBC # FLD AUTO: 9.23 K/UL — SIGNIFICANT CHANGE UP (ref 3.8–10.5)
WBC # FLD AUTO: 9.3 K/UL — SIGNIFICANT CHANGE UP (ref 3.8–10.5)
WBC # FLD AUTO: 9.32 K/UL — SIGNIFICANT CHANGE UP (ref 3.8–10.5)
WBC # FLD AUTO: 9.37 K/UL — SIGNIFICANT CHANGE UP (ref 3.8–10.5)
WBC # FLD AUTO: 9.53 K/UL — SIGNIFICANT CHANGE UP (ref 3.8–10.5)
WBC # FLD AUTO: 9.53 K/UL — SIGNIFICANT CHANGE UP (ref 3.8–10.5)
WBC # FLD AUTO: 9.58 K/UL — SIGNIFICANT CHANGE UP (ref 3.8–10.5)
WBC # FLD AUTO: 9.61 K/UL — SIGNIFICANT CHANGE UP (ref 3.8–10.5)
WBC # FLD AUTO: 9.64 K/UL — SIGNIFICANT CHANGE UP (ref 3.8–10.5)
WBC # FLD AUTO: 9.67 K/UL — SIGNIFICANT CHANGE UP (ref 3.8–10.5)
WBC # FLD AUTO: 9.86 K/UL — SIGNIFICANT CHANGE UP (ref 3.8–10.5)
WBC # FLD AUTO: 9.86 K/UL — SIGNIFICANT CHANGE UP (ref 3.8–10.5)
WBC # FLD AUTO: 9.96 K/UL — SIGNIFICANT CHANGE UP (ref 3.8–10.5)
WBC # FLD AUTO: 9.97 K/UL — SIGNIFICANT CHANGE UP (ref 3.8–10.5)
WBC CLUMPS # UR AUTO: PRESENT
WBC UR QL: 118 /HPF — HIGH (ref 0–5)
WBC UR QL: 35 /HPF — HIGH (ref 0–5)
WBC UR QL: 501 /HPF — HIGH (ref 0–5)
WBC UR QL: >998 /HPF — HIGH (ref 0–5)
YEAST-LIKE CELLS: PRESENT

## 2024-01-01 PROCEDURE — 84484 ASSAY OF TROPONIN QUANT: CPT

## 2024-01-01 PROCEDURE — 85520 HEPARIN ASSAY: CPT

## 2024-01-01 PROCEDURE — 99233 SBSQ HOSP IP/OBS HIGH 50: CPT

## 2024-01-01 PROCEDURE — 99291 CRITICAL CARE FIRST HOUR: CPT

## 2024-01-01 PROCEDURE — 93880 EXTRACRANIAL BILAT STUDY: CPT

## 2024-01-01 PROCEDURE — 99285 EMERGENCY DEPT VISIT HI MDM: CPT

## 2024-01-01 PROCEDURE — 86901 BLOOD TYPING SEROLOGIC RH(D): CPT

## 2024-01-01 PROCEDURE — 99233 SBSQ HOSP IP/OBS HIGH 50: CPT | Mod: GC

## 2024-01-01 PROCEDURE — 99222 1ST HOSP IP/OBS MODERATE 55: CPT

## 2024-01-01 PROCEDURE — 99232 SBSQ HOSP IP/OBS MODERATE 35: CPT

## 2024-01-01 PROCEDURE — 76770 US EXAM ABDO BACK WALL COMP: CPT

## 2024-01-01 PROCEDURE — 93355 ECHO TRANSESOPHAGEAL (TEE): CPT

## 2024-01-01 PROCEDURE — 82610 CYSTATIN C: CPT

## 2024-01-01 PROCEDURE — 93010 ELECTROCARDIOGRAM REPORT: CPT

## 2024-01-01 PROCEDURE — 87077 CULTURE AEROBIC IDENTIFY: CPT

## 2024-01-01 PROCEDURE — 84100 ASSAY OF PHOSPHORUS: CPT

## 2024-01-01 PROCEDURE — 70450 CT HEAD/BRAIN W/O DYE: CPT | Mod: 26

## 2024-01-01 PROCEDURE — 36620 INSERTION CATHETER ARTERY: CPT

## 2024-01-01 PROCEDURE — 99292 CRITICAL CARE ADDL 30 MIN: CPT | Mod: FS

## 2024-01-01 PROCEDURE — 83880 ASSAY OF NATRIURETIC PEPTIDE: CPT

## 2024-01-01 PROCEDURE — 83935 ASSAY OF URINE OSMOLALITY: CPT

## 2024-01-01 PROCEDURE — 82088 ASSAY OF ALDOSTERONE: CPT

## 2024-01-01 PROCEDURE — 97162 PT EVAL MOD COMPLEX 30 MIN: CPT

## 2024-01-01 PROCEDURE — 76937 US GUIDE VASCULAR ACCESS: CPT | Mod: 26

## 2024-01-01 PROCEDURE — 93321 DOPPLER ECHO F-UP/LMTD STD: CPT | Mod: 26

## 2024-01-01 PROCEDURE — 72125 CT NECK SPINE W/O DYE: CPT | Mod: 26

## 2024-01-01 PROCEDURE — 36573 INSJ PICC RS&I 5 YR+: CPT

## 2024-01-01 PROCEDURE — 71045 X-RAY EXAM CHEST 1 VIEW: CPT | Mod: 26

## 2024-01-01 PROCEDURE — 80048 BASIC METABOLIC PNL TOTAL CA: CPT

## 2024-01-01 PROCEDURE — 85730 THROMBOPLASTIN TIME PARTIAL: CPT

## 2024-01-01 PROCEDURE — 36415 COLL VENOUS BLD VENIPUNCTURE: CPT

## 2024-01-01 PROCEDURE — 99232 SBSQ HOSP IP/OBS MODERATE 35: CPT | Mod: GC

## 2024-01-01 PROCEDURE — 82024 ASSAY OF ACTH: CPT

## 2024-01-01 PROCEDURE — 71045 X-RAY EXAM CHEST 1 VIEW: CPT | Mod: 26,76

## 2024-01-01 PROCEDURE — 99291 CRITICAL CARE FIRST HOUR: CPT | Mod: 25

## 2024-01-01 PROCEDURE — 99239 HOSP IP/OBS DSCHRG MGMT >30: CPT

## 2024-01-01 PROCEDURE — 74018 RADEX ABDOMEN 1 VIEW: CPT

## 2024-01-01 PROCEDURE — 99223 1ST HOSP IP/OBS HIGH 75: CPT

## 2024-01-01 PROCEDURE — 84156 ASSAY OF PROTEIN URINE: CPT

## 2024-01-01 PROCEDURE — 87040 BLOOD CULTURE FOR BACTERIA: CPT

## 2024-01-01 PROCEDURE — C1889: CPT

## 2024-01-01 PROCEDURE — 99232 SBSQ HOSP IP/OBS MODERATE 35: CPT | Mod: 25

## 2024-01-01 PROCEDURE — 94003 VENT MGMT INPAT SUBQ DAY: CPT

## 2024-01-01 PROCEDURE — C1752: CPT

## 2024-01-01 PROCEDURE — 77001 FLUOROGUIDE FOR VEIN DEVICE: CPT | Mod: 26

## 2024-01-01 PROCEDURE — 36800 INSERTION OF CANNULA: CPT

## 2024-01-01 PROCEDURE — 88312 SPECIAL STAINS GROUP 1: CPT

## 2024-01-01 PROCEDURE — 96374 THER/PROPH/DIAG INJ IV PUSH: CPT

## 2024-01-01 PROCEDURE — 76376 3D RENDER W/INTRP POSTPROCES: CPT

## 2024-01-01 PROCEDURE — 86923 COMPATIBILITY TEST ELECTRIC: CPT

## 2024-01-01 PROCEDURE — 86900 BLOOD TYPING SEROLOGIC ABO: CPT

## 2024-01-01 PROCEDURE — 85610 PROTHROMBIN TIME: CPT

## 2024-01-01 PROCEDURE — 36556 INSERT NON-TUNNEL CV CATH: CPT

## 2024-01-01 PROCEDURE — 93970 EXTREMITY STUDY: CPT

## 2024-01-01 PROCEDURE — 99233 SBSQ HOSP IP/OBS HIGH 50: CPT | Mod: 24

## 2024-01-01 PROCEDURE — 75574 CT ANGIO HRT W/3D IMAGE: CPT | Mod: 26

## 2024-01-01 PROCEDURE — 74176 CT ABD & PELVIS W/O CONTRAST: CPT | Mod: 26

## 2024-01-01 PROCEDURE — 93005 ELECTROCARDIOGRAM TRACING: CPT

## 2024-01-01 PROCEDURE — 99496 TRANSJ CARE MGMT HIGH F2F 7D: CPT

## 2024-01-01 PROCEDURE — 87086 URINE CULTURE/COLONY COUNT: CPT

## 2024-01-01 PROCEDURE — 97530 THERAPEUTIC ACTIVITIES: CPT

## 2024-01-01 PROCEDURE — 84300 ASSAY OF URINE SODIUM: CPT

## 2024-01-01 PROCEDURE — 85027 COMPLETE CBC AUTOMATED: CPT

## 2024-01-01 PROCEDURE — 94010 BREATHING CAPACITY TEST: CPT | Mod: 26

## 2024-01-01 PROCEDURE — 99292 CRITICAL CARE ADDL 30 MIN: CPT

## 2024-01-01 PROCEDURE — 82627 DEHYDROEPIANDROSTERONE: CPT

## 2024-01-01 PROCEDURE — 93306 TTE W/DOPPLER COMPLETE: CPT | Mod: 26

## 2024-01-01 PROCEDURE — C1887: CPT

## 2024-01-01 PROCEDURE — 97602 WOUND(S) CARE NON-SELECTIVE: CPT

## 2024-01-01 PROCEDURE — 93325 DOPPLER ECHO COLOR FLOW MAPG: CPT

## 2024-01-01 PROCEDURE — G2211 COMPLEX E/M VISIT ADD ON: CPT | Mod: PD

## 2024-01-01 PROCEDURE — 83036 HEMOGLOBIN GLYCOSYLATED A1C: CPT

## 2024-01-01 PROCEDURE — 99292 CRITICAL CARE ADDL 30 MIN: CPT | Mod: FS,25

## 2024-01-01 PROCEDURE — 93312 ECHO TRANSESOPHAGEAL: CPT

## 2024-01-01 PROCEDURE — 97116 GAIT TRAINING THERAPY: CPT

## 2024-01-01 PROCEDURE — 82962 GLUCOSE BLOOD TEST: CPT

## 2024-01-01 PROCEDURE — 87340 HEPATITIS B SURFACE AG IA: CPT

## 2024-01-01 PROCEDURE — 99285 EMERGENCY DEPT VISIT HI MDM: CPT | Mod: 25

## 2024-01-01 PROCEDURE — 99261: CPT

## 2024-01-01 PROCEDURE — 85014 HEMATOCRIT: CPT

## 2024-01-01 PROCEDURE — 86850 RBC ANTIBODY SCREEN: CPT

## 2024-01-01 PROCEDURE — 83735 ASSAY OF MAGNESIUM: CPT

## 2024-01-01 PROCEDURE — 76937 US GUIDE VASCULAR ACCESS: CPT

## 2024-01-01 PROCEDURE — 93308 TTE F-UP OR LMTD: CPT | Mod: 26

## 2024-01-01 PROCEDURE — 93970 EXTREMITY STUDY: CPT | Mod: 26

## 2024-01-01 PROCEDURE — 82157 ASSAY OF ANDROSTENEDIONE: CPT

## 2024-01-01 PROCEDURE — 99285 EMERGENCY DEPT VISIT HI MDM: CPT | Mod: GC

## 2024-01-01 PROCEDURE — 84540 ASSAY OF URINE/UREA-N: CPT

## 2024-01-01 PROCEDURE — 88305 TISSUE EXAM BY PATHOLOGIST: CPT

## 2024-01-01 PROCEDURE — 82330 ASSAY OF CALCIUM: CPT

## 2024-01-01 PROCEDURE — 33274 TCAT INSJ/RPL PERM LDLS PM: CPT

## 2024-01-01 PROCEDURE — 83605 ASSAY OF LACTIC ACID: CPT

## 2024-01-01 PROCEDURE — C1874: CPT

## 2024-01-01 PROCEDURE — C1786: CPT

## 2024-01-01 PROCEDURE — 36558 INSERT TUNNELED CV CATH: CPT

## 2024-01-01 PROCEDURE — 93279 PRGRMG DEV EVAL PM/LDLS PM: CPT | Mod: 26

## 2024-01-01 PROCEDURE — 74174 CTA ABD&PLVS W/CONTRAST: CPT | Mod: MC

## 2024-01-01 PROCEDURE — 93325 DOPPLER ECHO COLOR FLOW MAPG: CPT | Mod: 26

## 2024-01-01 PROCEDURE — 71045 X-RAY EXAM CHEST 1 VIEW: CPT

## 2024-01-01 PROCEDURE — 99215 OFFICE O/P EST HI 40 MIN: CPT

## 2024-01-01 PROCEDURE — 80053 COMPREHEN METABOLIC PANEL: CPT

## 2024-01-01 PROCEDURE — 99222 1ST HOSP IP/OBS MODERATE 55: CPT | Mod: GC

## 2024-01-01 PROCEDURE — 93321 DOPPLER ECHO F-UP/LMTD STD: CPT

## 2024-01-01 PROCEDURE — 93880 EXTRACRANIAL BILAT STUDY: CPT | Mod: 26

## 2024-01-01 PROCEDURE — C8929: CPT

## 2024-01-01 PROCEDURE — 74176 CT ABD & PELVIS W/O CONTRAST: CPT

## 2024-01-01 PROCEDURE — 82533 TOTAL CORTISOL: CPT

## 2024-01-01 PROCEDURE — 84133 ASSAY OF URINE POTASSIUM: CPT

## 2024-01-01 PROCEDURE — 86704 HEP B CORE ANTIBODY TOTAL: CPT

## 2024-01-01 PROCEDURE — 97164 PT RE-EVAL EST PLAN CARE: CPT

## 2024-01-01 PROCEDURE — 82272 OCCULT BLD FECES 1-3 TESTS: CPT

## 2024-01-01 PROCEDURE — 82435 ASSAY OF BLOOD CHLORIDE: CPT

## 2024-01-01 PROCEDURE — 86480 TB TEST CELL IMMUN MEASURE: CPT

## 2024-01-01 PROCEDURE — 76000 FLUOROSCOPY <1 HR PHYS/QHP: CPT

## 2024-01-01 PROCEDURE — 82530 CORTISOL FREE: CPT

## 2024-01-01 PROCEDURE — 82570 ASSAY OF URINE CREATININE: CPT

## 2024-01-01 PROCEDURE — 99233 SBSQ HOSP IP/OBS HIGH 50: CPT | Mod: NC

## 2024-01-01 PROCEDURE — 83520 IMMUNOASSAY QUANT NOS NONAB: CPT

## 2024-01-01 PROCEDURE — 70450 CT HEAD/BRAIN W/O DYE: CPT | Mod: MC

## 2024-01-01 PROCEDURE — 83835 ASSAY OF METANEPHRINES: CPT

## 2024-01-01 PROCEDURE — 94660 CPAP INITIATION&MGMT: CPT

## 2024-01-01 PROCEDURE — 87102 FUNGUS ISOLATION CULTURE: CPT

## 2024-01-01 PROCEDURE — 77001 FLUOROGUIDE FOR VEIN DEVICE: CPT

## 2024-01-01 PROCEDURE — 87186 SC STD MICRODIL/AGAR DIL: CPT

## 2024-01-01 PROCEDURE — 97166 OT EVAL MOD COMPLEX 45 MIN: CPT

## 2024-01-01 PROCEDURE — G0463: CPT

## 2024-01-01 PROCEDURE — 93320 DOPPLER ECHO COMPLETE: CPT

## 2024-01-01 PROCEDURE — 87637 SARSCOV2&INF A&B&RSV AMP PRB: CPT

## 2024-01-01 PROCEDURE — 82803 BLOOD GASES ANY COMBINATION: CPT

## 2024-01-01 PROCEDURE — 84439 ASSAY OF FREE THYROXINE: CPT

## 2024-01-01 PROCEDURE — 29581 APPL MULTLAYER CMPRN SYS LEG: CPT | Mod: AS,50

## 2024-01-01 PROCEDURE — 33361 REPLACE AORTIC VALVE PERQ: CPT | Mod: 62,Q0

## 2024-01-01 PROCEDURE — 94010 BREATHING CAPACITY TEST: CPT

## 2024-01-01 PROCEDURE — 74176 CT ABD & PELVIS W/O CONTRAST: CPT | Mod: MC

## 2024-01-01 PROCEDURE — 97535 SELF CARE MNGMENT TRAINING: CPT

## 2024-01-01 PROCEDURE — L8699: CPT

## 2024-01-01 PROCEDURE — 97597 DBRDMT OPN WND 1ST 20 CM/<: CPT

## 2024-01-01 PROCEDURE — 86022 PLATELET ANTIBODIES: CPT

## 2024-01-01 PROCEDURE — 51703 INSERT BLADDER CATH COMPLEX: CPT

## 2024-01-01 PROCEDURE — 81001 URINALYSIS AUTO W/SCOPE: CPT

## 2024-01-01 PROCEDURE — 86706 HEP B SURFACE ANTIBODY: CPT

## 2024-01-01 PROCEDURE — 99233 SBSQ HOSP IP/OBS HIGH 50: CPT | Mod: 25

## 2024-01-01 PROCEDURE — 80299 QUANTITATIVE ASSAY DRUG: CPT

## 2024-01-01 PROCEDURE — 83690 ASSAY OF LIPASE: CPT

## 2024-01-01 PROCEDURE — 84681 ASSAY OF C-PEPTIDE: CPT

## 2024-01-01 PROCEDURE — 93971 EXTREMITY STUDY: CPT | Mod: 26,LT

## 2024-01-01 PROCEDURE — 80061 LIPID PANEL: CPT

## 2024-01-01 PROCEDURE — C1760: CPT

## 2024-01-01 PROCEDURE — 97110 THERAPEUTIC EXERCISES: CPT

## 2024-01-01 PROCEDURE — 82565 ASSAY OF CREATININE: CPT

## 2024-01-01 PROCEDURE — 87389 HIV-1 AG W/HIV-1&-2 AB AG IA: CPT

## 2024-01-01 PROCEDURE — 87640 STAPH A DNA AMP PROBE: CPT

## 2024-01-01 PROCEDURE — 99231 SBSQ HOSP IP/OBS SF/LOW 25: CPT

## 2024-01-01 PROCEDURE — 80202 ASSAY OF VANCOMYCIN: CPT

## 2024-01-01 PROCEDURE — 88305 TISSUE EXAM BY PATHOLOGIST: CPT | Mod: 26

## 2024-01-01 PROCEDURE — 99213 OFFICE O/P EST LOW 20 MIN: CPT

## 2024-01-01 PROCEDURE — 82947 ASSAY GLUCOSE BLOOD QUANT: CPT

## 2024-01-01 PROCEDURE — 83970 ASSAY OF PARATHORMONE: CPT

## 2024-01-01 PROCEDURE — 82010 KETONE BODYS QUAN: CPT

## 2024-01-01 PROCEDURE — 93306 TTE W/DOPPLER COMPLETE: CPT

## 2024-01-01 PROCEDURE — 85018 HEMOGLOBIN: CPT

## 2024-01-01 PROCEDURE — 74018 RADEX ABDOMEN 1 VIEW: CPT | Mod: 26

## 2024-01-01 PROCEDURE — 85025 COMPLETE CBC W/AUTO DIFF WBC: CPT

## 2024-01-01 PROCEDURE — 93308 TTE F-UP OR LMTD: CPT

## 2024-01-01 PROCEDURE — 94002 VENT MGMT INPAT INIT DAY: CPT

## 2024-01-01 PROCEDURE — 75574 CT ANGIO HRT W/3D IMAGE: CPT | Mod: MC

## 2024-01-01 PROCEDURE — 87150 DNA/RNA AMPLIFIED PROBE: CPT

## 2024-01-01 PROCEDURE — 0225U NFCT DS DNA&RNA 21 SARSCOV2: CPT

## 2024-01-01 PROCEDURE — 93000 ELECTROCARDIOGRAM COMPLETE: CPT | Mod: PD

## 2024-01-01 PROCEDURE — C1894: CPT

## 2024-01-01 PROCEDURE — 87641 MR-STAPH DNA AMP PROBE: CPT

## 2024-01-01 PROCEDURE — 82310 ASSAY OF CALCIUM: CPT

## 2024-01-01 PROCEDURE — C1751: CPT

## 2024-01-01 PROCEDURE — 86803 HEPATITIS C AB TEST: CPT

## 2024-01-01 PROCEDURE — 84295 ASSAY OF SERUM SODIUM: CPT

## 2024-01-01 PROCEDURE — 93971 EXTREMITY STUDY: CPT

## 2024-01-01 PROCEDURE — 76770 US EXAM ABDO BACK WALL COMP: CPT | Mod: 26

## 2024-01-01 PROCEDURE — C1725: CPT

## 2024-01-01 PROCEDURE — C1769: CPT

## 2024-01-01 PROCEDURE — 99497 ADVNCD CARE PLAN 30 MIN: CPT | Mod: 25

## 2024-01-01 PROCEDURE — 33361 REPLACE AORTIC VALVE PERQ: CPT | Mod: Q0,62

## 2024-01-01 PROCEDURE — 84132 ASSAY OF SERUM POTASSIUM: CPT

## 2024-01-01 PROCEDURE — 36000 PLACE NEEDLE IN VEIN: CPT

## 2024-01-01 PROCEDURE — 99284 EMERGENCY DEPT VISIT MOD MDM: CPT | Mod: 25

## 2024-01-01 PROCEDURE — 74174 CTA ABD&PLVS W/CONTRAST: CPT | Mod: 26

## 2024-01-01 PROCEDURE — 99291 CRITICAL CARE FIRST HOUR: CPT | Mod: FS,25

## 2024-01-01 PROCEDURE — 11104 PUNCH BX SKIN SINGLE LESION: CPT

## 2024-01-01 PROCEDURE — P9047: CPT

## 2024-01-01 PROCEDURE — 99284 EMERGENCY DEPT VISIT MOD MDM: CPT

## 2024-01-01 PROCEDURE — 84244 ASSAY OF RENIN: CPT

## 2024-01-01 PROCEDURE — 33210 INSERT ELECTRD/PM CATH SNGL: CPT

## 2024-01-01 PROCEDURE — 84443 ASSAY THYROID STIM HORMONE: CPT

## 2024-01-01 PROCEDURE — 71250 CT THORAX DX C-: CPT | Mod: 26

## 2024-01-01 PROCEDURE — 99223 1ST HOSP IP/OBS HIGH 75: CPT | Mod: GC

## 2024-01-01 PROCEDURE — 71275 CT ANGIOGRAPHY CHEST: CPT | Mod: MC

## 2024-01-01 PROCEDURE — 88312 SPECIAL STAINS GROUP 1: CPT | Mod: 26

## 2024-01-01 PROCEDURE — 71275 CT ANGIOGRAPHY CHEST: CPT | Mod: 26

## 2024-01-01 PROCEDURE — 93010 ELECTROCARDIOGRAM REPORT: CPT | Mod: 76

## 2024-01-01 PROCEDURE — P9045: CPT

## 2024-01-01 DEVICE — GUIDEWIRE RUNTHROUGH NS 180CM: Type: IMPLANTABLE DEVICE | Status: FUNCTIONAL

## 2024-01-01 DEVICE — IMPLANTABLE DEVICE: Type: IMPLANTABLE DEVICE | Status: FUNCTIONAL

## 2024-01-01 DEVICE — BLLN TRUE DIALATION 22MMX4.5CM: Type: IMPLANTABLE DEVICE | Status: FUNCTIONAL

## 2024-01-01 DEVICE — CATH VASCULAR EXPO VENTRICULAR PIGTAIL CURVE (PIG) 0.045" X 5FR X 100CM: Type: IMPLANTABLE DEVICE | Status: FUNCTIONAL

## 2024-01-01 DEVICE — SHEATH INTRODUCER TERUMO PINNACLE CORONARY 6FR X 10CM X 0.038" MINI WIRE: Type: IMPLANTABLE DEVICE | Status: FUNCTIONAL

## 2024-01-01 DEVICE — CATH DIAG 5F AL1: Type: IMPLANTABLE DEVICE | Status: FUNCTIONAL

## 2024-01-01 DEVICE — CATH IMPULSE PIG 145 5FR X 110CM: Type: IMPLANTABLE DEVICE | Status: FUNCTIONAL

## 2024-01-01 DEVICE — WIRE GD CONFIDA BRECKER CRV .035X260: Type: IMPLANTABLE DEVICE | Status: FUNCTIONAL

## 2024-01-01 DEVICE — SUT PERCLOSE PROGLIDE 6FR: Type: IMPLANTABLE DEVICE | Status: FUNCTIONAL

## 2024-01-01 DEVICE — PACING CATH PACEL RIGHT HEART CURVE 5FR: Type: IMPLANTABLE DEVICE | Status: FUNCTIONAL

## 2024-01-01 DEVICE — GWIRE STR .038X180 STIFF LONG TAPR: Type: IMPLANTABLE DEVICE | Status: FUNCTIONAL

## 2024-01-01 DEVICE — GUIDEWIRE AMPLATZ SUPER-STIFF STRAIGHT .035" X 180CM: Type: IMPLANTABLE DEVICE | Status: FUNCTIONAL

## 2024-01-01 DEVICE — GWIRE INQWIRE JTIP .035X260MM: Type: IMPLANTABLE DEVICE | Status: FUNCTIONAL

## 2024-01-01 DEVICE — VLV TRANS CATH W/COMM SYS SAPIEN 3 ULTRA 23MM: Type: IMPLANTABLE DEVICE | Status: FUNCTIONAL

## 2024-01-01 DEVICE — CATH IMPULSE FR4 5F X 100CM: Type: IMPLANTABLE DEVICE | Status: FUNCTIONAL

## 2024-01-01 DEVICE — GWIRE FIXED CORE PTFE .035 X 150CM: Type: IMPLANTABLE DEVICE | Status: FUNCTIONAL

## 2024-01-01 DEVICE — SHEATH INTRODUCER TERUMO PINNACLE CORONARY 8FR X 10CM X 0.038" MINI WIRE: Type: IMPLANTABLE DEVICE | Status: FUNCTIONAL

## 2024-01-01 DEVICE — KIT RADIAL MINI ACCESS PRELUDE SHEATH: Type: IMPLANTABLE DEVICE | Status: FUNCTIONAL

## 2024-01-01 RX ORDER — NOREPINEPHRINE BITARTRATE 1 MG/ML
0.05 INJECTION INTRAVENOUS
Qty: 8 | Refills: 0 | Status: DISCONTINUED | OUTPATIENT
Start: 2024-01-01 | End: 2024-01-01

## 2024-01-01 RX ORDER — PANTOPRAZOLE SODIUM 20 MG/1
1 TABLET, DELAYED RELEASE ORAL
Qty: 0 | Refills: 0 | DISCHARGE
Start: 2024-01-01

## 2024-01-01 RX ORDER — INSULIN LISPRO 100/ML
5 VIAL (ML) SUBCUTANEOUS
Refills: 0 | Status: DISCONTINUED | OUTPATIENT
Start: 2024-01-01 | End: 2024-01-01

## 2024-01-01 RX ORDER — MUPIROCIN 20 MG/G
1 OINTMENT TOPICAL
Refills: 0 | Status: COMPLETED | OUTPATIENT
Start: 2024-01-01 | End: 2024-01-01

## 2024-01-01 RX ORDER — VANCOMYCIN HCL 1 G
1000 VIAL (EA) INTRAVENOUS EVERY 12 HOURS
Refills: 0 | Status: DISCONTINUED | OUTPATIENT
Start: 2024-01-01 | End: 2024-01-01

## 2024-01-01 RX ORDER — HEPARIN SODIUM 5000 [USP'U]/ML
10000 INJECTION INTRAVENOUS; SUBCUTANEOUS ONCE
Refills: 0 | Status: DISCONTINUED | OUTPATIENT
Start: 2024-01-01 | End: 2024-01-01

## 2024-01-01 RX ORDER — ERYTHROPOIETIN 10000 [IU]/ML
10000 INJECTION, SOLUTION INTRAVENOUS; SUBCUTANEOUS
Refills: 0 | Status: DISCONTINUED | OUTPATIENT
Start: 2024-01-01 | End: 2024-01-01

## 2024-01-01 RX ORDER — INSULIN GLARGINE 100 [IU]/ML
7 INJECTION, SOLUTION SUBCUTANEOUS ONCE
Refills: 0 | Status: COMPLETED | OUTPATIENT
Start: 2024-01-01 | End: 2024-01-01

## 2024-01-01 RX ORDER — CALAMINE AND ZINC OXIDE AND PHENOL 160; 10 MG/ML; MG/ML
1 LOTION TOPICAL THREE TIMES A DAY
Refills: 0 | Status: DISCONTINUED | OUTPATIENT
Start: 2024-01-01 | End: 2024-01-01

## 2024-01-01 RX ORDER — DIPHENHYDRAMINE HCL 50 MG
25 CAPSULE ORAL ONCE
Refills: 0 | Status: COMPLETED | OUTPATIENT
Start: 2024-01-01 | End: 2024-01-01

## 2024-01-01 RX ORDER — NOREPINEPHRINE BITARTRATE 1 MG/ML
0.14 INJECTION INTRAVENOUS
Qty: 16 | Refills: 0 | Status: DISCONTINUED | OUTPATIENT
Start: 2024-01-01 | End: 2024-01-01

## 2024-01-01 RX ORDER — CHOLECALCIFEROL (VITAMIN D3) 125 MCG
1 CAPSULE ORAL
Refills: 0 | DISCHARGE

## 2024-01-01 RX ORDER — INSULIN GLARGINE 100 [IU]/ML
16 INJECTION, SOLUTION SUBCUTANEOUS AT BEDTIME
Refills: 0 | Status: DISCONTINUED | OUTPATIENT
Start: 2024-01-01 | End: 2024-01-01

## 2024-01-01 RX ORDER — INSULIN LISPRO 100/ML
4 VIAL (ML) SUBCUTANEOUS
Refills: 0 | Status: DISCONTINUED | OUTPATIENT
Start: 2024-01-01 | End: 2024-01-01

## 2024-01-01 RX ORDER — INSULIN LISPRO 100 [IU]/ML
INJECTION, SOLUTION SUBCUTANEOUS
Refills: 0 | Status: DISCONTINUED | OUTPATIENT
Start: 2024-01-01 | End: 2024-01-01

## 2024-01-01 RX ORDER — INSULIN GLARGINE 100 [IU]/ML
15 INJECTION, SOLUTION SUBCUTANEOUS AT BEDTIME
Refills: 0 | Status: DISCONTINUED | OUTPATIENT
Start: 2024-01-01 | End: 2024-01-01

## 2024-01-01 RX ORDER — SODIUM CHLORIDE 9 MG/ML
1000 INJECTION, SOLUTION INTRAVENOUS
Refills: 0 | Status: DISCONTINUED | OUTPATIENT
Start: 2024-01-01 | End: 2024-01-01

## 2024-01-01 RX ORDER — SODIUM CHLORIDE 9 MG/ML
1000 INJECTION INTRAMUSCULAR; INTRAVENOUS; SUBCUTANEOUS
Refills: 0 | Status: DISCONTINUED | OUTPATIENT
Start: 2024-01-01 | End: 2024-01-01

## 2024-01-01 RX ORDER — ATORVASTATIN CALCIUM 20 MG/1
80 TABLET, FILM COATED ORAL AT BEDTIME
Refills: 0 | Status: DISCONTINUED | OUTPATIENT
Start: 2024-01-01 | End: 2024-01-01

## 2024-01-01 RX ORDER — FLUDROCORTISONE ACETATE 0.1 MG/1
0.1 TABLET ORAL DAILY
Refills: 0 | Status: DISCONTINUED | OUTPATIENT
Start: 2024-01-01 | End: 2024-01-01

## 2024-01-01 RX ORDER — AMMONIUM LACTATE 12 %
1 LOTION (GRAM) TOPICAL
Qty: 0 | Refills: 0 | DISCHARGE
Start: 2024-01-01

## 2024-01-01 RX ORDER — ATORVASTATIN CALCIUM 80 MG/1
80 TABLET, FILM COATED ORAL
Qty: 90 | Refills: 3 | Status: ACTIVE | COMMUNITY
Start: 1900-01-01 | End: 1900-01-01

## 2024-01-01 RX ORDER — DOBUTAMINE HCL 250MG/20ML
5 VIAL (ML) INTRAVENOUS
Qty: 1000 | Refills: 0 | Status: DISCONTINUED | OUTPATIENT
Start: 2024-01-01 | End: 2024-01-01

## 2024-01-01 RX ORDER — FLUDROCORTISONE ACETATE 0.1 MG/1
0.2 TABLET ORAL EVERY 12 HOURS
Refills: 0 | Status: DISCONTINUED | OUTPATIENT
Start: 2024-01-01 | End: 2024-01-01

## 2024-01-01 RX ORDER — CEFTRIAXONE 500 MG/1
1000 INJECTION, POWDER, FOR SOLUTION INTRAMUSCULAR; INTRAVENOUS EVERY 24 HOURS
Refills: 0 | Status: DISCONTINUED | OUTPATIENT
Start: 2024-01-01 | End: 2024-01-01

## 2024-01-01 RX ORDER — MUPIROCIN 20 MG/G
1 OINTMENT TOPICAL THREE TIMES A DAY
Refills: 0 | Status: COMPLETED | OUTPATIENT
Start: 2024-01-01 | End: 2024-01-01

## 2024-01-01 RX ORDER — CHLORHEXIDINE GLUCONATE 213 G/1000ML
15 SOLUTION TOPICAL EVERY 12 HOURS
Refills: 0 | Status: DISCONTINUED | OUTPATIENT
Start: 2024-01-01 | End: 2024-01-01

## 2024-01-01 RX ORDER — ACETAMINOPHEN 500 MG
1000 TABLET ORAL ONCE
Refills: 0 | Status: COMPLETED | OUTPATIENT
Start: 2024-01-01 | End: 2024-01-01

## 2024-01-01 RX ORDER — PETROLATUM,WHITE
1 JELLY (GRAM) TOPICAL THREE TIMES A DAY
Refills: 0 | Status: DISCONTINUED | OUTPATIENT
Start: 2024-01-01 | End: 2024-01-01

## 2024-01-01 RX ORDER — ASPIRIN/CALCIUM CARB/MAGNESIUM 324 MG
81 TABLET ORAL DAILY
Refills: 0 | Status: DISCONTINUED | OUTPATIENT
Start: 2024-01-01 | End: 2024-01-01

## 2024-01-01 RX ORDER — VANCOMYCIN HYDROCHLORIDE 50 MG/ML
KIT ORAL
Refills: 0 | Status: DISCONTINUED | OUTPATIENT
Start: 2024-01-01 | End: 2024-01-01

## 2024-01-01 RX ORDER — DEXTROSE 50 % IN WATER 50 %
15 SYRINGE (ML) INTRAVENOUS ONCE
Refills: 0 | Status: DISCONTINUED | OUTPATIENT
Start: 2024-01-01 | End: 2024-01-01

## 2024-01-01 RX ORDER — ALBUMIN HUMAN 5 %
50 INTRAVENOUS SOLUTION INTRAVENOUS EVERY 6 HOURS
Refills: 0 | Status: DISCONTINUED | OUTPATIENT
Start: 2024-01-01 | End: 2024-01-01

## 2024-01-01 RX ORDER — LEVOTHYROXINE SODIUM 125 MCG
75 TABLET ORAL DAILY
Refills: 0 | Status: DISCONTINUED | OUTPATIENT
Start: 2024-01-01 | End: 2024-01-01

## 2024-01-01 RX ORDER — DEXTROSE MONOHYDRATE AND SODIUM CHLORIDE 5; .3 G/100ML; G/100ML
1000 INJECTION, SOLUTION INTRAVENOUS
Refills: 0 | Status: DISCONTINUED | OUTPATIENT
Start: 2024-01-01 | End: 2024-01-01

## 2024-01-01 RX ORDER — DROXIDOPA 200 MG/1
600 CAPSULE ORAL ONCE
Refills: 0 | Status: COMPLETED | OUTPATIENT
Start: 2024-01-01 | End: 2024-01-01

## 2024-01-01 RX ORDER — INSULIN LISPRO 100/ML
7 VIAL (ML) SUBCUTANEOUS
Refills: 0 | Status: DISCONTINUED | OUTPATIENT
Start: 2024-01-01 | End: 2024-01-01

## 2024-01-01 RX ORDER — LINEZOLID 600 MG/300ML
INJECTION, SOLUTION INTRAVENOUS
Refills: 0 | Status: DISCONTINUED | OUTPATIENT
Start: 2024-01-01 | End: 2024-01-01

## 2024-01-01 RX ORDER — HEPARIN SODIUM 5000 [USP'U]/ML
300 INJECTION INTRAVENOUS; SUBCUTANEOUS
Qty: 10000 | Refills: 0 | Status: DISCONTINUED | OUTPATIENT
Start: 2024-01-01 | End: 2024-01-01

## 2024-01-01 RX ORDER — DOBUTAMINE HCL 250MG/20ML
3 VIAL (ML) INTRAVENOUS
Qty: 1000 | Refills: 0 | Status: DISCONTINUED | OUTPATIENT
Start: 2024-01-01 | End: 2024-01-01

## 2024-01-01 RX ORDER — HEPARIN SODIUM 5000 [USP'U]/ML
5000 INJECTION INTRAVENOUS; SUBCUTANEOUS EVERY 8 HOURS
Refills: 0 | Status: DISCONTINUED | OUTPATIENT
Start: 2024-01-01 | End: 2024-01-01

## 2024-01-01 RX ORDER — INSULIN GLARGINE 100 [IU]/ML
18 INJECTION, SOLUTION SUBCUTANEOUS AT BEDTIME
Refills: 0 | Status: DISCONTINUED | OUTPATIENT
Start: 2024-01-01 | End: 2024-01-01

## 2024-01-01 RX ORDER — INSULIN GLARGINE 100 [IU]/ML
12 INJECTION, SOLUTION SUBCUTANEOUS AT BEDTIME
Refills: 0 | Status: DISCONTINUED | OUTPATIENT
Start: 2024-01-01 | End: 2024-01-01

## 2024-01-01 RX ORDER — INSULIN LISPRO 100/ML
VIAL (ML) SUBCUTANEOUS
Refills: 0 | Status: DISCONTINUED | OUTPATIENT
Start: 2024-01-01 | End: 2024-01-01

## 2024-01-01 RX ORDER — DEXTROSE 50 % IN WATER 50 %
12.5 SYRINGE (ML) INTRAVENOUS ONCE
Refills: 0 | Status: DISCONTINUED | OUTPATIENT
Start: 2024-01-01 | End: 2024-01-01

## 2024-01-01 RX ORDER — FERROUS SULFATE 325(65) MG
1 TABLET ORAL
Qty: 0 | Refills: 0 | DISCHARGE
Start: 2024-01-01

## 2024-01-01 RX ORDER — INSULIN GLARGINE 100 [IU]/ML
8 INJECTION, SOLUTION SUBCUTANEOUS AT BEDTIME
Refills: 0 | Status: DISCONTINUED | OUTPATIENT
Start: 2024-01-01 | End: 2024-01-01

## 2024-01-01 RX ORDER — MIDODRINE HYDROCHLORIDE 10 MG/1
30 TABLET ORAL
Refills: 0 | Status: DISCONTINUED | OUTPATIENT
Start: 2024-01-01 | End: 2024-01-01

## 2024-01-01 RX ORDER — HEPARIN SODIUM 5000 [USP'U]/ML
5000 INJECTION INTRAVENOUS; SUBCUTANEOUS EVERY 8 HOURS
Refills: 0 | Status: COMPLETED | OUTPATIENT
Start: 2024-01-01 | End: 2024-01-01

## 2024-01-01 RX ORDER — ATORVASTATIN CALCIUM 80 MG/1
80 TABLET, FILM COATED ORAL AT BEDTIME
Refills: 0 | Status: DISCONTINUED | OUTPATIENT
Start: 2024-01-01 | End: 2024-01-01

## 2024-01-01 RX ORDER — ATORVASTATIN CALCIUM 80 MG/1
1 TABLET, FILM COATED ORAL
Qty: 0 | Refills: 0 | DISCHARGE
Start: 2024-01-01

## 2024-01-01 RX ORDER — BIOTIN/FOLIC AC/VIT BCOMP,C/ZN 3MG-0.8MG
15 TABLET ORAL DAILY
Refills: 0 | Status: DISCONTINUED | OUTPATIENT
Start: 2024-01-01 | End: 2024-01-01

## 2024-01-01 RX ORDER — INSULIN GLARGINE 100 [IU]/ML
34 INJECTION, SOLUTION SUBCUTANEOUS AT BEDTIME
Refills: 0 | Status: DISCONTINUED | OUTPATIENT
Start: 2024-01-01 | End: 2024-01-01

## 2024-01-01 RX ORDER — FLUDROCORTISONE ACETATE 0.1 MG/1
0.2 TABLET ORAL DAILY
Refills: 0 | Status: DISCONTINUED | OUTPATIENT
Start: 2024-01-01 | End: 2024-01-01

## 2024-01-01 RX ORDER — DEXTROSE 10 % IN WATER 10 %
125 INTRAVENOUS SOLUTION INTRAVENOUS ONCE
Refills: 0 | Status: DISCONTINUED | OUTPATIENT
Start: 2024-01-01 | End: 2024-01-01

## 2024-01-01 RX ORDER — MIDODRINE HYDROCHLORIDE 2.5 MG/1
10 TABLET ORAL EVERY 8 HOURS
Refills: 0 | Status: DISCONTINUED | OUTPATIENT
Start: 2024-01-01 | End: 2024-01-01

## 2024-01-01 RX ORDER — INSULIN GLARGINE 100 [IU]/ML
6 INJECTION, SOLUTION SUBCUTANEOUS
Qty: 0 | Refills: 0 | DISCHARGE
Start: 2024-01-01

## 2024-01-01 RX ORDER — CEFEPIME 1 G/1
2000 INJECTION, POWDER, FOR SOLUTION INTRAMUSCULAR; INTRAVENOUS EVERY 12 HOURS
Refills: 0 | Status: DISCONTINUED | OUTPATIENT
Start: 2024-01-01 | End: 2024-01-01

## 2024-01-01 RX ORDER — FERROUS SULFATE 325(65) MG
325 TABLET ORAL
Refills: 0 | Status: DISCONTINUED | OUTPATIENT
Start: 2024-01-01 | End: 2024-01-01

## 2024-01-01 RX ORDER — ONDANSETRON 8 MG/1
4 TABLET, FILM COATED ORAL ONCE
Refills: 0 | Status: DISCONTINUED | OUTPATIENT
Start: 2024-01-01 | End: 2024-01-01

## 2024-01-01 RX ORDER — CEFTRIAXONE 500 MG/1
1000 INJECTION, POWDER, FOR SOLUTION INTRAMUSCULAR; INTRAVENOUS ONCE
Refills: 0 | Status: COMPLETED | OUTPATIENT
Start: 2024-01-01 | End: 2024-01-01

## 2024-01-01 RX ORDER — MEROPENEM 500 MG/1
500 INJECTION, POWDER, FOR SOLUTION INTRAVENOUS ONCE
Refills: 0 | Status: COMPLETED | OUTPATIENT
Start: 2024-01-01 | End: 2024-01-01

## 2024-01-01 RX ORDER — DEXTROSE 50 % IN WATER 50 %
25 SYRINGE (ML) INTRAVENOUS ONCE
Refills: 0 | Status: DISCONTINUED | OUTPATIENT
Start: 2024-01-01 | End: 2024-01-01

## 2024-01-01 RX ORDER — VASOPRESSIN 20 UNIT/ML
0.02 VIAL (ML) INJECTION
Qty: 40 | Refills: 0 | Status: DISCONTINUED | OUTPATIENT
Start: 2024-01-01 | End: 2024-01-01

## 2024-01-01 RX ORDER — CALAMINE
1 LOTION (ML) TOPICAL THREE TIMES A DAY
Refills: 0 | Status: DISCONTINUED | OUTPATIENT
Start: 2024-01-01 | End: 2024-01-01

## 2024-01-01 RX ORDER — DEXTROSE 30 % IN WATER 30 %
15 VIAL (ML) INTRAVENOUS ONCE
Refills: 0 | Status: DISCONTINUED | OUTPATIENT
Start: 2024-01-01 | End: 2024-01-01

## 2024-01-01 RX ORDER — LEVOTHYROXINE SODIUM 25 MCG
75 TABLET ORAL DAILY
Refills: 0 | Status: DISCONTINUED | OUTPATIENT
Start: 2024-01-01 | End: 2024-01-01

## 2024-01-01 RX ORDER — INSULIN GLARGINE 100 [IU]/ML
100 INJECTION, SOLUTION SUBCUTANEOUS AT BEDTIME
Refills: 0 | Status: ACTIVE | COMMUNITY

## 2024-01-01 RX ORDER — BUMETANIDE 0.25 MG/ML
4 INJECTION INTRAMUSCULAR; INTRAVENOUS ONCE
Refills: 0 | Status: COMPLETED | OUTPATIENT
Start: 2024-01-01 | End: 2024-01-01

## 2024-01-01 RX ORDER — AMMONIUM LACTATE 12 %
1 LOTION (GRAM) TOPICAL EVERY 12 HOURS
Refills: 0 | Status: DISCONTINUED | OUTPATIENT
Start: 2024-01-01 | End: 2024-01-01

## 2024-01-01 RX ORDER — DEXTROSE 30 % IN WATER 30 %
25 VIAL (ML) INTRAVENOUS ONCE
Refills: 0 | Status: COMPLETED | OUTPATIENT
Start: 2024-01-01 | End: 2024-01-01

## 2024-01-01 RX ORDER — INSULIN LISPRO 100 [IU]/ML
5 INJECTION, SOLUTION SUBCUTANEOUS
Refills: 0 | Status: DISCONTINUED | OUTPATIENT
Start: 2024-01-01 | End: 2024-01-01

## 2024-01-01 RX ORDER — INSULIN LISPRO 100/ML
2 VIAL (ML) SUBCUTANEOUS
Qty: 0 | Refills: 0 | DISCHARGE
Start: 2024-01-01

## 2024-01-01 RX ORDER — VANCOMYCIN HCL 1 G
1000 VIAL (EA) INTRAVENOUS ONCE
Refills: 0 | Status: COMPLETED | OUTPATIENT
Start: 2024-01-01 | End: 2024-01-01

## 2024-01-01 RX ORDER — POTASSIUM CHLORIDE 20 MEQ
40 PACKET (EA) ORAL ONCE
Refills: 0 | Status: COMPLETED | OUTPATIENT
Start: 2024-01-01 | End: 2024-01-01

## 2024-01-01 RX ORDER — INSULIN LISPRO 100 [IU]/ML
3 INJECTION, SOLUTION SUBCUTANEOUS
Refills: 0 | Status: DISCONTINUED | OUTPATIENT
Start: 2024-01-01 | End: 2024-01-01

## 2024-01-01 RX ORDER — ONDANSETRON 8 MG/1
4 TABLET, FILM COATED ORAL ONCE
Refills: 0 | Status: COMPLETED | OUTPATIENT
Start: 2024-01-01 | End: 2024-01-01

## 2024-01-01 RX ORDER — METOCLOPRAMIDE HCL 10 MG
5 TABLET ORAL EVERY 8 HOURS
Refills: 0 | Status: DISCONTINUED | OUTPATIENT
Start: 2024-01-01 | End: 2024-01-01

## 2024-01-01 RX ORDER — VANCOMYCIN HCL 1 G
1000 VIAL (EA) INTRAVENOUS ONCE
Refills: 0 | Status: DISCONTINUED | OUTPATIENT
Start: 2024-01-01 | End: 2024-01-01

## 2024-01-01 RX ORDER — DEXTROSE MONOHYDRATE 100 MG/ML
125 INJECTION, SOLUTION INTRAVENOUS ONCE
Refills: 0 | Status: DISCONTINUED | OUTPATIENT
Start: 2024-01-01 | End: 2024-01-01

## 2024-01-01 RX ORDER — MIDODRINE HYDROCHLORIDE 10 MG/1
20 TABLET ORAL
Refills: 0 | Status: DISCONTINUED | OUTPATIENT
Start: 2024-01-01 | End: 2024-01-01

## 2024-01-01 RX ORDER — PYRIDOSTIGMINE BROMIDE 60 MG/1
60 TABLET ORAL
Refills: 0 | Status: DISCONTINUED | OUTPATIENT
Start: 2024-01-01 | End: 2024-01-01

## 2024-01-01 RX ORDER — VASOPRESSIN 20 [USP'U]/ML
0.02 INJECTION INTRAVENOUS
Qty: 40 | Refills: 0 | Status: DISCONTINUED | OUTPATIENT
Start: 2024-01-01 | End: 2024-01-01

## 2024-01-01 RX ORDER — LOPERAMIDE HCL 2 MG
2 TABLET ORAL THREE TIMES A DAY
Refills: 0 | Status: DISCONTINUED | OUTPATIENT
Start: 2024-01-01 | End: 2024-01-01

## 2024-01-01 RX ORDER — INSULIN LISPRO 100/ML
3 VIAL (ML) SUBCUTANEOUS
Refills: 0 | Status: DISCONTINUED | OUTPATIENT
Start: 2024-01-01 | End: 2024-01-01

## 2024-01-01 RX ORDER — SODIUM CHLORIDE 9 MG/ML
500 INJECTION, SOLUTION INTRAVENOUS
Refills: 0 | Status: DISCONTINUED | OUTPATIENT
Start: 2024-01-01 | End: 2024-01-01

## 2024-01-01 RX ORDER — MEROPENEM 500 MG/1
INJECTION, POWDER, FOR SOLUTION INTRAVENOUS
Refills: 0 | Status: DISCONTINUED | OUTPATIENT
Start: 2024-01-01 | End: 2024-01-01

## 2024-01-01 RX ORDER — TORSEMIDE 20 MG/1
20 TABLET ORAL DAILY
Qty: 180 | Refills: 1 | Status: ACTIVE | COMMUNITY
Start: 2022-08-12

## 2024-01-01 RX ORDER — INSULIN LISPRO 100/ML
VIAL (ML) SUBCUTANEOUS AT BEDTIME
Refills: 0 | Status: DISCONTINUED | OUTPATIENT
Start: 2024-01-01 | End: 2024-01-01

## 2024-01-01 RX ORDER — INSULIN LISPRO 100/ML
9 VIAL (ML) SUBCUTANEOUS
Refills: 0 | Status: DISCONTINUED | OUTPATIENT
Start: 2024-01-01 | End: 2024-01-01

## 2024-01-01 RX ORDER — DIPHENHYDRAMINE HCL 50 MG
50 CAPSULE ORAL ONCE
Refills: 0 | Status: COMPLETED | OUTPATIENT
Start: 2024-01-01 | End: 2024-01-01

## 2024-01-01 RX ORDER — INSULIN LISPRO 100/ML
10 VIAL (ML) SUBCUTANEOUS
Refills: 0 | Status: DISCONTINUED | OUTPATIENT
Start: 2024-01-01 | End: 2024-01-01

## 2024-01-01 RX ORDER — ASPIRIN/CALCIUM CARB/MAGNESIUM 324 MG
1 TABLET ORAL
Qty: 0 | Refills: 0 | DISCHARGE
Start: 2024-01-01

## 2024-01-01 RX ORDER — INSULIN GLARGINE 100 [IU]/ML
20 INJECTION, SOLUTION SUBCUTANEOUS AT BEDTIME
Refills: 0 | Status: DISCONTINUED | OUTPATIENT
Start: 2024-01-01 | End: 2024-01-01

## 2024-01-01 RX ORDER — INSULIN GLARGINE 100 [IU]/ML
6 INJECTION, SOLUTION SUBCUTANEOUS ONCE
Refills: 0 | Status: COMPLETED | OUTPATIENT
Start: 2024-01-01 | End: 2024-01-01

## 2024-01-01 RX ORDER — INSULIN GLARGINE 100 [IU]/ML
20 INJECTION, SOLUTION SUBCUTANEOUS EVERY MORNING
Refills: 0 | Status: DISCONTINUED | OUTPATIENT
Start: 2024-01-01 | End: 2024-01-01

## 2024-01-01 RX ORDER — CEFEPIME 1 G/1
1000 INJECTION, POWDER, FOR SOLUTION INTRAMUSCULAR; INTRAVENOUS EVERY 24 HOURS
Refills: 0 | Status: DISCONTINUED | OUTPATIENT
Start: 2024-01-01 | End: 2024-01-01

## 2024-01-01 RX ORDER — DAPTOMYCIN 500 MG/20ML
850 INJECTION, POWDER, LYOPHILIZED, FOR SUSPENSION INTRAVENOUS EVERY 24 HOURS
Refills: 0 | Status: DISCONTINUED | OUTPATIENT
Start: 2024-01-01 | End: 2024-01-01

## 2024-01-01 RX ORDER — CALAMINE AND ZINC OXIDE AND PHENOL 160; 10 MG/ML; MG/ML
0 LOTION TOPICAL
Qty: 0 | Refills: 0 | DISCHARGE
Start: 2024-01-01

## 2024-01-01 RX ORDER — SODIUM CHLORIDE 9 MG/ML
1000 INJECTION, SOLUTION INTRAVENOUS
Qty: 0 | Refills: 0 | DISCHARGE
Start: 2024-01-01

## 2024-01-01 RX ORDER — METOPROLOL TARTRATE 50 MG
100 TABLET ORAL DAILY
Refills: 0 | Status: DISCONTINUED | OUTPATIENT
Start: 2024-01-01 | End: 2024-01-01

## 2024-01-01 RX ORDER — INSULIN REGULAR, HUMAN 100/ML
1 VIAL (ML) INJECTION
Qty: 100 | Refills: 0 | Status: DISCONTINUED | OUTPATIENT
Start: 2024-01-01 | End: 2024-01-01

## 2024-01-01 RX ORDER — DEXTROSE 50 % IN WATER 50 %
12.5 SYRINGE (ML) INTRAVENOUS ONCE
Refills: 0 | Status: COMPLETED | OUTPATIENT
Start: 2024-01-01 | End: 2024-01-01

## 2024-01-01 RX ORDER — ASCORBIC ACID 60 MG
500 TABLET,CHEWABLE ORAL DAILY
Refills: 0 | Status: DISCONTINUED | OUTPATIENT
Start: 2024-01-01 | End: 2024-01-01

## 2024-01-01 RX ORDER — MAGNESIUM SULFATE 500 MG/ML
2 VIAL (ML) INJECTION ONCE
Refills: 0 | Status: COMPLETED | OUTPATIENT
Start: 2024-01-01 | End: 2024-01-01

## 2024-01-01 RX ORDER — METOPROLOL TARTRATE 50 MG
1 TABLET ORAL
Qty: 0 | Refills: 0 | DISCHARGE
Start: 2024-01-01

## 2024-01-01 RX ORDER — INSULIN GLARGINE 100 [IU]/ML
25 INJECTION, SOLUTION SUBCUTANEOUS AT BEDTIME
Refills: 0 | Status: DISCONTINUED | OUTPATIENT
Start: 2024-01-01 | End: 2024-01-01

## 2024-01-01 RX ORDER — SODIUM CHLORIDE 0.9 % (FLUSH) 0.9 %
500 SYRINGE (ML) INJECTION ONCE
Refills: 0 | Status: DISCONTINUED | OUTPATIENT
Start: 2024-01-01 | End: 2024-01-01

## 2024-01-01 RX ORDER — DEXTROSE 50 % IN WATER 50 %
50 SYRINGE (ML) INTRAVENOUS
Qty: 0 | Refills: 0 | DISCHARGE
Start: 2024-01-01

## 2024-01-01 RX ORDER — CHLORHEXIDINE GLUCONATE 213 G/1000ML
1 SOLUTION TOPICAL
Refills: 0 | Status: DISCONTINUED | OUTPATIENT
Start: 2024-01-01 | End: 2024-01-01

## 2024-01-01 RX ORDER — INSULIN GLARGINE 100 [IU]/ML
8 INJECTION, SOLUTION SUBCUTANEOUS ONCE
Refills: 0 | Status: DISCONTINUED | OUTPATIENT
Start: 2024-01-01 | End: 2024-01-01

## 2024-01-01 RX ORDER — NOREPINEPHRINE BITARTRATE 1 MG/ML
0.33 INJECTION INTRAVENOUS
Qty: 16 | Refills: 0 | Status: DISCONTINUED | OUTPATIENT
Start: 2024-01-01 | End: 2024-01-01

## 2024-01-01 RX ORDER — DROXIDOPA 200 MG/1
600 CAPSULE ORAL THREE TIMES A DAY
Refills: 0 | Status: DISCONTINUED | OUTPATIENT
Start: 2024-01-01 | End: 2024-01-01

## 2024-01-01 RX ORDER — VANCOMYCIN HYDROCHLORIDE 50 MG/ML
2000 KIT ORAL EVERY 24 HOURS
Refills: 0 | Status: DISCONTINUED | OUTPATIENT
Start: 2024-01-01 | End: 2024-01-01

## 2024-01-01 RX ORDER — INSULIN GLARGINE 100 [IU]/ML
10 INJECTION, SOLUTION SUBCUTANEOUS AT BEDTIME
Refills: 0 | Status: COMPLETED | OUTPATIENT
Start: 2024-01-01 | End: 2024-01-01

## 2024-01-01 RX ORDER — SODIUM BICARBONATE 650 MG/1
0.11 TABLET ORAL
Qty: 150 | Refills: 0 | Status: DISCONTINUED | OUTPATIENT
Start: 2024-01-01 | End: 2024-01-01

## 2024-01-01 RX ORDER — DEXTROSE 30 % IN WATER 30 %
25 VIAL (ML) INTRAVENOUS ONCE
Refills: 0 | Status: DISCONTINUED | OUTPATIENT
Start: 2024-01-01 | End: 2024-01-01

## 2024-01-01 RX ORDER — INSULIN GLARGINE 100 [IU]/ML
10 INJECTION, SOLUTION SUBCUTANEOUS AT BEDTIME
Refills: 0 | Status: DISCONTINUED | OUTPATIENT
Start: 2024-01-01 | End: 2024-01-01

## 2024-01-01 RX ORDER — NYSTATIN 100000/G
1 POWDER (GRAM) TOPICAL
Refills: 0 | Status: DISCONTINUED | OUTPATIENT
Start: 2024-01-01 | End: 2024-01-01

## 2024-01-01 RX ORDER — CETIRIZINE HYDROCHLORIDE 10 MG/1
10 TABLET ORAL DAILY
Refills: 0 | Status: DISCONTINUED | OUTPATIENT
Start: 2024-01-01 | End: 2024-01-01

## 2024-01-01 RX ORDER — VANCOMYCIN HCL 1 G
1500 VIAL (EA) INTRAVENOUS EVERY 24 HOURS
Refills: 0 | Status: DISCONTINUED | OUTPATIENT
Start: 2024-01-01 | End: 2024-01-01

## 2024-01-01 RX ORDER — INSULIN LISPRO 100/ML
2 VIAL (ML) SUBCUTANEOUS
Refills: 0 | Status: DISCONTINUED | OUTPATIENT
Start: 2024-01-01 | End: 2024-01-01

## 2024-01-01 RX ORDER — MIDODRINE HYDROCHLORIDE 2.5 MG/1
15 TABLET ORAL THREE TIMES A DAY
Refills: 0 | Status: DISCONTINUED | OUTPATIENT
Start: 2024-01-01 | End: 2024-01-01

## 2024-01-01 RX ORDER — LEVOTHYROXINE SODIUM 25 MCG
1 TABLET ORAL
Qty: 0 | Refills: 0 | DISCHARGE
Start: 2024-01-01

## 2024-01-01 RX ORDER — DROXIDOPA 100 MG/1
400 CAPSULE ORAL THREE TIMES A DAY
Refills: 0 | Status: DISCONTINUED | OUTPATIENT
Start: 2024-01-01 | End: 2024-01-01

## 2024-01-01 RX ORDER — ASPIRIN 325 MG/1
81 TABLET, FILM COATED ORAL DAILY
Refills: 0 | Status: DISCONTINUED | OUTPATIENT
Start: 2024-01-01 | End: 2024-01-01

## 2024-01-01 RX ORDER — CEFEPIME 1 G/1
1000 INJECTION, POWDER, FOR SOLUTION INTRAMUSCULAR; INTRAVENOUS EVERY 12 HOURS
Refills: 0 | Status: DISCONTINUED | OUTPATIENT
Start: 2024-01-01 | End: 2024-01-01

## 2024-01-01 RX ORDER — INSULIN GLARGINE 100 [IU]/ML
32 INJECTION, SOLUTION SUBCUTANEOUS AT BEDTIME
Refills: 0 | Status: DISCONTINUED | OUTPATIENT
Start: 2024-01-01 | End: 2024-01-01

## 2024-01-01 RX ORDER — GLUCAGON INJECTION, SOLUTION 0.5 MG/.1ML
1 INJECTION, SOLUTION SUBCUTANEOUS ONCE
Refills: 0 | Status: DISCONTINUED | OUTPATIENT
Start: 2024-01-01 | End: 2024-01-01

## 2024-01-01 RX ORDER — INSULIN LISPRO 100/ML
8 VIAL (ML) SUBCUTANEOUS
Refills: 0 | Status: DISCONTINUED | OUTPATIENT
Start: 2024-01-01 | End: 2024-01-01

## 2024-01-01 RX ORDER — BUMETANIDE 0.25 MG/ML
0.5 INJECTION INTRAMUSCULAR; INTRAVENOUS
Qty: 20 | Refills: 0 | Status: DISCONTINUED | OUTPATIENT
Start: 2024-01-01 | End: 2024-01-01

## 2024-01-01 RX ORDER — INSULIN GLARGINE 100 [IU]/ML
10 INJECTION, SOLUTION SUBCUTANEOUS ONCE
Refills: 0 | Status: COMPLETED | OUTPATIENT
Start: 2024-01-01 | End: 2024-01-01

## 2024-01-01 RX ORDER — INSULIN GLARGINE 100 [IU]/ML
10 INJECTION, SOLUTION SUBCUTANEOUS EVERY MORNING
Refills: 0 | Status: DISCONTINUED | OUTPATIENT
Start: 2024-01-01 | End: 2024-01-01

## 2024-01-01 RX ORDER — INSULIN LISPRO 100 [IU]/ML
3 INJECTION, SOLUTION SUBCUTANEOUS ONCE
Refills: 0 | Status: DISCONTINUED | OUTPATIENT
Start: 2024-01-01 | End: 2024-01-01

## 2024-01-01 RX ORDER — CEFTRIAXONE 500 MG/1
1000 INJECTION, POWDER, FOR SOLUTION INTRAMUSCULAR; INTRAVENOUS EVERY 24 HOURS
Refills: 0 | Status: COMPLETED | OUTPATIENT
Start: 2024-01-01 | End: 2024-01-01

## 2024-01-01 RX ORDER — POTASSIUM CHLORIDE 20 MEQ
20 PACKET (EA) ORAL ONCE
Refills: 0 | Status: COMPLETED | OUTPATIENT
Start: 2024-01-01 | End: 2024-01-01

## 2024-01-01 RX ORDER — PROPOFOL 10 MG/ML
30 INJECTION, EMULSION INTRAVENOUS
Qty: 1000 | Refills: 0 | Status: DISCONTINUED | OUTPATIENT
Start: 2024-01-01 | End: 2024-01-01

## 2024-01-01 RX ORDER — HEPARIN SODIUM 5000 [USP'U]/ML
5000 INJECTION INTRAVENOUS; SUBCUTANEOUS EVERY 12 HOURS
Refills: 0 | Status: DISCONTINUED | OUTPATIENT
Start: 2024-01-01 | End: 2024-01-01

## 2024-01-01 RX ORDER — MIDODRINE HYDROCHLORIDE 2.5 MG/1
30 TABLET ORAL
Refills: 0 | Status: DISCONTINUED | OUTPATIENT
Start: 2024-01-01 | End: 2024-01-01

## 2024-01-01 RX ORDER — LEVOTHYROXINE SODIUM 0.07 MG/1
75 TABLET ORAL
Refills: 0 | Status: ACTIVE | COMMUNITY
Start: 2023-01-01

## 2024-01-01 RX ORDER — INSULIN LISPRO 100/ML
5 VIAL (ML) SUBCUTANEOUS ONCE
Refills: 0 | Status: COMPLETED | OUTPATIENT
Start: 2024-01-01 | End: 2024-01-01

## 2024-01-01 RX ORDER — MIDODRINE HYDROCHLORIDE 2.5 MG/1
3 TABLET ORAL
Qty: 0 | Refills: 0 | DISCHARGE
Start: 2024-01-01

## 2024-01-01 RX ORDER — HUMAN INSULIN 100 [IU]/ML
6 INJECTION, SUSPENSION SUBCUTANEOUS ONCE
Refills: 0 | Status: COMPLETED | OUTPATIENT
Start: 2024-01-01 | End: 2024-01-01

## 2024-01-01 RX ORDER — MAGNESIUM SULFATE 100 %
1 POWDER (GRAM) MISCELLANEOUS ONCE
Refills: 0 | Status: COMPLETED | OUTPATIENT
Start: 2024-01-01 | End: 2024-01-01

## 2024-01-01 RX ORDER — INSULIN LISPRO 100/ML
VIAL (ML) SUBCUTANEOUS EVERY 4 HOURS
Refills: 0 | Status: DISCONTINUED | OUTPATIENT
Start: 2024-01-01 | End: 2024-01-01

## 2024-01-01 RX ORDER — DEXTROSE 10 % IN WATER 10 %
125 INTRAVENOUS SOLUTION INTRAVENOUS
Qty: 0 | Refills: 0 | DISCHARGE
Start: 2024-01-01

## 2024-01-01 RX ORDER — MIDODRINE HYDROCHLORIDE 10 MG/1
2 TABLET ORAL
Qty: 0 | Refills: 0 | DISCHARGE
Start: 2024-01-01

## 2024-01-01 RX ORDER — MIDODRINE HYDROCHLORIDE 2.5 MG/1
30 TABLET ORAL EVERY 8 HOURS
Refills: 0 | Status: DISCONTINUED | OUTPATIENT
Start: 2024-01-01 | End: 2024-01-01

## 2024-01-01 RX ORDER — VANCOMYCIN HCL 1 G
1500 VIAL (EA) INTRAVENOUS ONCE
Refills: 0 | Status: DISCONTINUED | OUTPATIENT
Start: 2024-01-01 | End: 2024-01-01

## 2024-01-01 RX ORDER — PSYLLIUM HUSK 3.4 G/7G
1 POWDER, FOR SOLUTION ORAL
Qty: 0 | Refills: 0 | DISCHARGE
Start: 2024-01-01

## 2024-01-01 RX ORDER — INSULIN HUMAN 100 [IU]/ML
3 INJECTION, SOLUTION SUBCUTANEOUS ONCE
Refills: 0 | Status: COMPLETED | OUTPATIENT
Start: 2024-01-01 | End: 2024-01-01

## 2024-01-01 RX ORDER — SODIUM CHLORIDE 0.9 % (FLUSH) 0.9 %
250 SYRINGE (ML) INJECTION
Qty: 0 | Refills: 0 | DISCHARGE
Start: 2024-01-01

## 2024-01-01 RX ORDER — CHLORHEXIDINE GLUCONATE 213 G/1000ML
1 SOLUTION TOPICAL ONCE
Refills: 0 | Status: COMPLETED | OUTPATIENT
Start: 2024-01-01 | End: 2024-01-01

## 2024-01-01 RX ORDER — CEFTRIAXONE 500 MG/1
INJECTION, POWDER, FOR SOLUTION INTRAMUSCULAR; INTRAVENOUS
Refills: 0 | Status: DISCONTINUED | OUTPATIENT
Start: 2024-01-01 | End: 2024-01-01

## 2024-01-01 RX ORDER — CIPROFLOXACIN LACTATE 400MG/40ML
1 VIAL (ML) INTRAVENOUS
Qty: 14 | Refills: 0
Start: 2024-01-01 | End: 2024-01-01

## 2024-01-01 RX ORDER — SODIUM CHLORIDE 9 MG/ML
1000 INJECTION INTRAMUSCULAR; INTRAVENOUS; SUBCUTANEOUS ONCE
Refills: 0 | Status: COMPLETED | OUTPATIENT
Start: 2024-01-01 | End: 2024-01-01

## 2024-01-01 RX ORDER — METOPROLOL TARTRATE 50 MG
5 TABLET ORAL EVERY 6 HOURS
Refills: 0 | Status: DISCONTINUED | OUTPATIENT
Start: 2024-01-01 | End: 2024-01-01

## 2024-01-01 RX ORDER — NYSTATIN/TRIAMCINOLONE ACET
1 OINTMENT (GRAM) TOPICAL EVERY 12 HOURS
Refills: 0 | Status: DISCONTINUED | OUTPATIENT
Start: 2024-01-01 | End: 2024-01-01

## 2024-01-01 RX ORDER — SODIUM CHLORIDE 9 MG/ML
500 INJECTION INTRAMUSCULAR; INTRAVENOUS; SUBCUTANEOUS
Refills: 0 | Status: DISCONTINUED | OUTPATIENT
Start: 2024-01-01 | End: 2024-01-01

## 2024-01-01 RX ORDER — INSULIN ASPART 100 [IU]/ML
0 INJECTION, SOLUTION SUBCUTANEOUS
Refills: 0 | DISCHARGE

## 2024-01-01 RX ORDER — INSULIN GLARGINE 100 [IU]/ML
24 INJECTION, SOLUTION SUBCUTANEOUS AT BEDTIME
Refills: 0 | Status: DISCONTINUED | OUTPATIENT
Start: 2024-01-01 | End: 2024-01-01

## 2024-01-01 RX ORDER — POLYETHYLENE GLYCOL 3350 17 G/17G
17 POWDER, FOR SOLUTION ORAL
Refills: 0 | Status: DISCONTINUED | OUTPATIENT
Start: 2024-01-01 | End: 2024-01-01

## 2024-01-01 RX ORDER — HEPARIN SODIUM 5000 [USP'U]/ML
INJECTION INTRAVENOUS; SUBCUTANEOUS
Qty: 25000 | Refills: 0 | Status: DISCONTINUED | OUTPATIENT
Start: 2024-01-01 | End: 2024-01-01

## 2024-01-01 RX ORDER — DEXMEDETOMIDINE HYDROCHLORIDE 4 UG/ML
0.5 INJECTION, SOLUTION INTRAVENOUS
Qty: 200 | Refills: 0 | Status: DISCONTINUED | OUTPATIENT
Start: 2024-01-01 | End: 2024-01-01

## 2024-01-01 RX ORDER — DEXAMETHASONE 0.5 MG/5ML
1 ELIXIR ORAL ONCE
Refills: 0 | Status: DISCONTINUED | OUTPATIENT
Start: 2024-01-01 | End: 2024-01-01

## 2024-01-01 RX ORDER — MIDODRINE HYDROCHLORIDE 2.5 MG/1
20 TABLET ORAL
Refills: 0 | Status: DISCONTINUED | OUTPATIENT
Start: 2024-01-01 | End: 2024-01-01

## 2024-01-01 RX ORDER — INSULIN LISPRO 100 [IU]/ML
INJECTION, SOLUTION SUBCUTANEOUS EVERY 6 HOURS
Refills: 0 | Status: DISCONTINUED | OUTPATIENT
Start: 2024-01-01 | End: 2024-01-01

## 2024-01-01 RX ORDER — FUROSEMIDE 40 MG
20 TABLET ORAL
Refills: 0 | Status: DISCONTINUED | OUTPATIENT
Start: 2024-01-01 | End: 2024-01-01

## 2024-01-01 RX ORDER — ACETAMINOPHEN 325 MG
650 TABLET ORAL EVERY 6 HOURS
Refills: 0 | Status: DISCONTINUED | OUTPATIENT
Start: 2024-01-01 | End: 2024-01-01

## 2024-01-01 RX ORDER — MAGNESIUM, ALUMINUM HYDROXIDE 400-400
30 TABLET,CHEWABLE ORAL
Qty: 0 | Refills: 0 | DISCHARGE
Start: 2024-01-01

## 2024-01-01 RX ORDER — ONDANSETRON 8 MG/1
4 TABLET, FILM COATED ORAL EVERY 6 HOURS
Refills: 0 | Status: DISCONTINUED | OUTPATIENT
Start: 2024-01-01 | End: 2024-01-01

## 2024-01-01 RX ORDER — APIXABAN 5 MG/1
5 TABLET, FILM COATED ORAL
Refills: 0 | Status: DISCONTINUED | OUTPATIENT
Start: 2024-01-01 | End: 2024-01-01

## 2024-01-01 RX ORDER — CLOPIDOGREL BISULFATE 75 MG/1
75 TABLET, FILM COATED ORAL DAILY
Refills: 0 | Status: DISCONTINUED | OUTPATIENT
Start: 2024-01-01 | End: 2024-01-01

## 2024-01-01 RX ORDER — SODIUM BICARBONATE 650 MG/1
0.22 TABLET ORAL
Qty: 150 | Refills: 0 | Status: DISCONTINUED | OUTPATIENT
Start: 2024-01-01 | End: 2024-01-01

## 2024-01-01 RX ORDER — SODIUM CHLORIDE 0.9 % (FLUSH) 0.9 %
10 SYRINGE (ML) INJECTION
Refills: 0 | Status: DISCONTINUED | OUTPATIENT
Start: 2024-01-01 | End: 2024-01-01

## 2024-01-01 RX ORDER — NYSTATIN CREAM 100000 [USP'U]/G
1 CREAM TOPICAL THREE TIMES A DAY
Refills: 0 | Status: DISCONTINUED | OUTPATIENT
Start: 2024-01-01 | End: 2024-01-01

## 2024-01-01 RX ORDER — ALBUMIN HUMAN 5 %
50 INTRAVENOUS SOLUTION INTRAVENOUS ONCE
Refills: 0 | Status: COMPLETED | OUTPATIENT
Start: 2024-01-01 | End: 2024-01-01

## 2024-01-01 RX ORDER — INSULIN GLARGINE 100 [IU]/ML
26 INJECTION, SOLUTION SUBCUTANEOUS AT BEDTIME
Refills: 0 | Status: DISCONTINUED | OUTPATIENT
Start: 2024-01-01 | End: 2024-01-01

## 2024-01-01 RX ORDER — PYRIDOSTIGMINE BROMIDE 60 MG/5ML
30 SOLUTION ORAL ONCE
Refills: 0 | Status: DISCONTINUED | OUTPATIENT
Start: 2024-01-01 | End: 2024-01-01

## 2024-01-01 RX ORDER — SODIUM CHLORIDE 0.9 % (FLUSH) 0.9 %
250 SYRINGE (ML) INJECTION ONCE
Refills: 0 | Status: DISCONTINUED | OUTPATIENT
Start: 2024-01-01 | End: 2024-01-01

## 2024-01-01 RX ORDER — HYDROMORPHONE HCL 0.2 MG/ML
1 INJECTION, SOLUTION INTRAVENOUS ONCE
Refills: 0 | Status: DISCONTINUED | OUTPATIENT
Start: 2024-01-01 | End: 2024-01-01

## 2024-01-01 RX ORDER — DROXIDOPA 100 MG/1
200 CAPSULE ORAL THREE TIMES A DAY
Refills: 0 | Status: DISCONTINUED | OUTPATIENT
Start: 2024-01-01 | End: 2024-01-01

## 2024-01-01 RX ORDER — MIDODRINE HYDROCHLORIDE 2.5 MG/1
10 TABLET ORAL ONCE
Refills: 0 | Status: COMPLETED | OUTPATIENT
Start: 2024-01-01 | End: 2024-01-01

## 2024-01-01 RX ORDER — PANTOPRAZOLE SODIUM 40 MG/10ML
1 INJECTION, POWDER, FOR SOLUTION INTRAVENOUS
Qty: 0 | Refills: 0 | DISCHARGE
Start: 2024-01-01

## 2024-01-01 RX ORDER — DEXAMETHASONE 0.5 MG/5ML
1 ELIXIR ORAL ONCE
Refills: 0 | Status: COMPLETED | OUTPATIENT
Start: 2024-01-01 | End: 2024-01-01

## 2024-01-01 RX ORDER — PANTOPRAZOLE SODIUM 40 MG/10ML
40 INJECTION, POWDER, FOR SOLUTION INTRAVENOUS
Refills: 0 | Status: DISCONTINUED | OUTPATIENT
Start: 2024-01-01 | End: 2024-01-01

## 2024-01-01 RX ORDER — SODIUM CHLORIDE 0.9 % (FLUSH) 0.9 %
250 SYRINGE (ML) INJECTION ONCE
Refills: 0 | Status: COMPLETED | OUTPATIENT
Start: 2024-01-01 | End: 2024-01-01

## 2024-01-01 RX ORDER — FERROUS SULFATE 325(65) MG
1 TABLET ORAL
Refills: 0 | DISCHARGE

## 2024-01-01 RX ORDER — PYRIDOSTIGMINE BROMIDE 60 MG/5ML
30 SOLUTION ORAL
Refills: 0 | Status: DISCONTINUED | OUTPATIENT
Start: 2024-01-01 | End: 2024-01-01

## 2024-01-01 RX ORDER — PANTOPRAZOLE SODIUM 20 MG/1
40 TABLET, DELAYED RELEASE ORAL
Refills: 0 | Status: DISCONTINUED | OUTPATIENT
Start: 2024-01-01 | End: 2024-01-01

## 2024-01-01 RX ORDER — CHLORHEXIDINE GLUCONATE 213 G/1000ML
30 SOLUTION TOPICAL ONCE
Refills: 0 | Status: DISCONTINUED | OUTPATIENT
Start: 2024-01-01 | End: 2024-01-01

## 2024-01-01 RX ORDER — FAMOTIDINE 10 MG/ML
20 INJECTION INTRAVENOUS DAILY
Refills: 0 | Status: DISCONTINUED | OUTPATIENT
Start: 2024-01-01 | End: 2024-01-01

## 2024-01-01 RX ORDER — APIXABAN 2.5 MG/1
5 TABLET, FILM COATED ORAL
Refills: 0 | Status: DISCONTINUED | OUTPATIENT
Start: 2024-01-01 | End: 2024-01-01

## 2024-01-01 RX ORDER — INSULIN GLARGINE 100 [IU]/ML
33 INJECTION, SOLUTION SUBCUTANEOUS
Refills: 0 | DISCHARGE

## 2024-01-01 RX ORDER — MIDODRINE HYDROCHLORIDE 2.5 MG/1
10 TABLET ORAL THREE TIMES A DAY
Refills: 0 | Status: DISCONTINUED | OUTPATIENT
Start: 2024-01-01 | End: 2024-01-01

## 2024-01-01 RX ORDER — NITROFURANTOIN MACROCRYSTAL 50 MG
100 CAPSULE ORAL
Refills: 0 | Status: COMPLETED | OUTPATIENT
Start: 2024-01-01 | End: 2024-01-01

## 2024-01-01 RX ORDER — INSULIN GLARGINE 100 [IU]/ML
14 INJECTION, SOLUTION SUBCUTANEOUS AT BEDTIME
Refills: 0 | Status: DISCONTINUED | OUTPATIENT
Start: 2024-01-01 | End: 2024-01-01

## 2024-01-01 RX ORDER — EPLERENONE 50 MG/1
1 TABLET, FILM COATED ORAL
Refills: 0 | DISCHARGE

## 2024-01-01 RX ORDER — DIPHENHYDRAMINE HCL 50 MG
50 CAPSULE ORAL EVERY 6 HOURS
Refills: 0 | Status: DISCONTINUED | OUTPATIENT
Start: 2024-01-01 | End: 2024-01-01

## 2024-01-01 RX ORDER — MIDODRINE HYDROCHLORIDE 2.5 MG/1
20 TABLET ORAL THREE TIMES A DAY
Refills: 0 | Status: DISCONTINUED | OUTPATIENT
Start: 2024-01-01 | End: 2024-01-01

## 2024-01-01 RX ORDER — PYRIDOSTIGMINE BROMIDE 60 MG/5ML
30 SOLUTION ORAL ONCE
Refills: 0 | Status: COMPLETED | OUTPATIENT
Start: 2024-01-01 | End: 2024-01-01

## 2024-01-01 RX ORDER — PSYLLIUM SEED (WITH DEXTROSE)
1 POWDER (GRAM) ORAL
Qty: 0 | Refills: 0 | DISCHARGE
Start: 2024-01-01

## 2024-01-01 RX ORDER — HEPARIN SODIUM 5000 [USP'U]/ML
10000 INJECTION INTRAVENOUS; SUBCUTANEOUS ONCE
Refills: 0 | Status: COMPLETED | OUTPATIENT
Start: 2024-01-01 | End: 2024-01-01

## 2024-01-01 RX ORDER — DROXIDOPA 100 MG/1
600 CAPSULE ORAL THREE TIMES A DAY
Refills: 0 | Status: DISCONTINUED | OUTPATIENT
Start: 2024-01-01 | End: 2024-01-01

## 2024-01-01 RX ORDER — FENTANYL CITRATE 50 UG/ML
25 INJECTION, SOLUTION INTRAMUSCULAR; INTRAVENOUS ONCE
Refills: 0 | Status: DISCONTINUED | OUTPATIENT
Start: 2024-01-01 | End: 2024-01-01

## 2024-01-01 RX ORDER — METOPROLOL TARTRATE 50 MG
1 TABLET ORAL
Refills: 0 | DISCHARGE

## 2024-01-01 RX ORDER — MIDODRINE HYDROCHLORIDE 10 MG/1
3 TABLET ORAL
Qty: 0 | Refills: 0 | DISCHARGE
Start: 2024-01-01

## 2024-01-01 RX ORDER — PYRIDOSTIGMINE BROMIDE 60 MG/5ML
1 SOLUTION ORAL
Qty: 0 | Refills: 0 | DISCHARGE
Start: 2024-01-01

## 2024-01-01 RX ORDER — PSYLLIUM SEED (WITH DEXTROSE)
1 POWDER (GRAM) ORAL THREE TIMES A DAY
Refills: 0 | Status: DISCONTINUED | OUTPATIENT
Start: 2024-01-01 | End: 2024-01-01

## 2024-01-01 RX ORDER — ALBUMIN HUMAN 5 %
100 INTRAVENOUS SOLUTION INTRAVENOUS EVERY 4 HOURS
Refills: 0 | Status: COMPLETED | OUTPATIENT
Start: 2024-01-01 | End: 2024-01-01

## 2024-01-01 RX ORDER — INSULIN GLARGINE 100 [IU]/ML
12 INJECTION, SOLUTION SUBCUTANEOUS EVERY MORNING
Refills: 0 | Status: DISCONTINUED | OUTPATIENT
Start: 2024-01-01 | End: 2024-01-01

## 2024-01-01 RX ORDER — INSULIN GLARGINE 100 [IU]/ML
21 INJECTION, SOLUTION SUBCUTANEOUS AT BEDTIME
Refills: 0 | Status: DISCONTINUED | OUTPATIENT
Start: 2024-01-01 | End: 2024-01-01

## 2024-01-01 RX ORDER — DEXTROSE 50 % IN WATER 50 %
1 SYRINGE (ML) INTRAVENOUS
Qty: 0 | Refills: 0 | DISCHARGE
Start: 2024-01-01

## 2024-01-01 RX ORDER — METOCLOPRAMIDE HCL 10 MG
10 TABLET ORAL EVERY 8 HOURS
Refills: 0 | Status: DISCONTINUED | OUTPATIENT
Start: 2024-01-01 | End: 2024-01-01

## 2024-01-01 RX ORDER — NOREPINEPHRINE BITARTRATE/D5W 8 MG/250ML
0.05 PLASTIC BAG, INJECTION (ML) INTRAVENOUS
Qty: 8 | Refills: 0 | Status: DISCONTINUED | OUTPATIENT
Start: 2024-01-01 | End: 2024-01-01

## 2024-01-01 RX ORDER — CHLORHEXIDINE GLUCONATE 213 G/1000ML
1 SOLUTION TOPICAL DAILY
Refills: 0 | Status: DISCONTINUED | OUTPATIENT
Start: 2024-01-01 | End: 2024-01-01

## 2024-01-01 RX ORDER — DROXIDOPA 100 MG/1
6 CAPSULE ORAL
Qty: 0 | Refills: 0 | DISCHARGE
Start: 2024-01-01

## 2024-01-01 RX ORDER — HEPARIN SODIUM 5000 [USP'U]/ML
2200 INJECTION INTRAVENOUS; SUBCUTANEOUS
Qty: 25000 | Refills: 0 | Status: DISCONTINUED | OUTPATIENT
Start: 2024-01-01 | End: 2024-01-01

## 2024-01-01 RX ORDER — APIXABAN 5 MG/1
1 TABLET, FILM COATED ORAL
Qty: 0 | Refills: 0 | DISCHARGE
Start: 2024-01-01

## 2024-01-01 RX ORDER — CLOPIDOGREL BISULFATE 75 MG/1
600 TABLET, FILM COATED ORAL ONCE
Refills: 0 | Status: COMPLETED | OUTPATIENT
Start: 2024-01-01 | End: 2024-01-01

## 2024-01-01 RX ORDER — VANCOMYCIN HYDROCHLORIDE 50 MG/ML
1500 KIT ORAL ONCE
Refills: 0 | Status: COMPLETED | OUTPATIENT
Start: 2024-01-01 | End: 2024-01-01

## 2024-01-01 RX ORDER — SENNA PLUS 8.6 MG/1
2 TABLET ORAL AT BEDTIME
Refills: 0 | Status: DISCONTINUED | OUTPATIENT
Start: 2024-01-01 | End: 2024-01-01

## 2024-01-01 RX ORDER — LOPERAMIDE HCL 2 MG
2 TABLET ORAL ONCE
Refills: 0 | Status: COMPLETED | OUTPATIENT
Start: 2024-01-01 | End: 2024-01-01

## 2024-01-01 RX ORDER — INSULIN LISPRO 100/ML
VIAL (ML) SUBCUTANEOUS EVERY 6 HOURS
Refills: 0 | Status: DISCONTINUED | OUTPATIENT
Start: 2024-01-01 | End: 2024-01-01

## 2024-01-01 RX ORDER — PROPOFOL 10 MG/ML
29.95 INJECTION, EMULSION INTRAVENOUS
Qty: 1000 | Refills: 0 | Status: DISCONTINUED | OUTPATIENT
Start: 2024-01-01 | End: 2024-01-01

## 2024-01-01 RX ORDER — LEVOTHYROXINE SODIUM 125 MCG
56 TABLET ORAL AT BEDTIME
Refills: 0 | Status: DISCONTINUED | OUTPATIENT
Start: 2024-01-01 | End: 2024-01-01

## 2024-01-01 RX ORDER — ACETAMINOPHEN 325 MG
2 TABLET ORAL
Qty: 0 | Refills: 0 | DISCHARGE
Start: 2024-01-01

## 2024-01-01 RX ORDER — ATORVASTATIN CALCIUM 20 MG/1
1 TABLET, FILM COATED ORAL
Qty: 0 | Refills: 0 | DISCHARGE
Start: 2024-01-01

## 2024-01-01 RX ORDER — ALBUMIN HUMAN 25 %
50 VIAL (ML) INTRAVENOUS ONCE
Refills: 0 | Status: COMPLETED | OUTPATIENT
Start: 2024-01-01 | End: 2024-01-01

## 2024-01-01 RX ORDER — VANCOMYCIN HCL 1 G
1250 VIAL (EA) INTRAVENOUS EVERY 12 HOURS
Refills: 0 | Status: DISCONTINUED | OUTPATIENT
Start: 2024-01-01 | End: 2024-01-01

## 2024-01-01 RX ORDER — APIXABAN 2.5 MG/1
5 TABLET, FILM COATED ORAL EVERY 12 HOURS
Refills: 0 | Status: DISCONTINUED | OUTPATIENT
Start: 2024-01-01 | End: 2024-01-01

## 2024-01-01 RX ORDER — PANTOPRAZOLE SODIUM 20 MG/1
1 TABLET, DELAYED RELEASE ORAL
Qty: 30 | Refills: 0
Start: 2024-01-01 | End: 2024-01-01

## 2024-01-01 RX ORDER — INSULIN LISPRO 100/ML
6 VIAL (ML) SUBCUTANEOUS
Refills: 0 | Status: DISCONTINUED | OUTPATIENT
Start: 2024-01-01 | End: 2024-01-01

## 2024-01-01 RX ORDER — INSULIN GLARGINE 100 [IU]/ML
22 INJECTION, SOLUTION SUBCUTANEOUS AT BEDTIME
Refills: 0 | Status: DISCONTINUED | OUTPATIENT
Start: 2024-01-01 | End: 2024-01-01

## 2024-01-01 RX ORDER — FUROSEMIDE 40 MG
80 TABLET ORAL ONCE
Refills: 0 | Status: COMPLETED | OUTPATIENT
Start: 2024-01-01 | End: 2024-01-01

## 2024-01-01 RX ORDER — SODIUM BICARBONATE 650 MG/1
50 TABLET ORAL
Refills: 0 | Status: COMPLETED | OUTPATIENT
Start: 2024-01-01 | End: 2024-01-01

## 2024-01-01 RX ORDER — DIPHENHYDRAMINE HCL 50 MG
50 CAPSULE ORAL EVERY 4 HOURS
Refills: 0 | Status: ACTIVE | OUTPATIENT
Start: 2024-01-01 | End: 2025-03-15

## 2024-01-01 RX ORDER — MIDODRINE HYDROCHLORIDE 2.5 MG/1
2 TABLET ORAL
Qty: 0 | Refills: 0 | DISCHARGE
Start: 2024-01-01

## 2024-01-01 RX ORDER — CASPOFUNGIN ACETATE 5 MG/ML
70 INJECTION, POWDER, LYOPHILIZED, FOR SOLUTION INTRAVENOUS ONCE
Refills: 0 | Status: COMPLETED | OUTPATIENT
Start: 2024-01-01 | End: 2024-01-01

## 2024-01-01 RX ORDER — SODIUM,POTASSIUM PHOSPHATES 278-250MG
1 POWDER IN PACKET (EA) ORAL
Refills: 0 | Status: DISCONTINUED | OUTPATIENT
Start: 2024-01-01 | End: 2024-01-01

## 2024-01-01 RX ORDER — POTASSIUM CHLORIDE 20 MEQ
2 PACKET (EA) ORAL
Refills: 0 | DISCHARGE

## 2024-01-01 RX ORDER — DEXTROSE 30 % IN WATER 30 %
50 VIAL (ML) INTRAVENOUS
Refills: 0 | Status: DISCONTINUED | OUTPATIENT
Start: 2024-01-01 | End: 2024-01-01

## 2024-01-01 RX ORDER — PYRIDOSTIGMINE BROMIDE 60 MG/5ML
60 SOLUTION ORAL ONCE
Refills: 0 | Status: DISCONTINUED | OUTPATIENT
Start: 2024-01-01 | End: 2024-01-01

## 2024-01-01 RX ORDER — MEROPENEM 500 MG/1
500 INJECTION, POWDER, FOR SOLUTION INTRAVENOUS EVERY 12 HOURS
Refills: 0 | Status: DISCONTINUED | OUTPATIENT
Start: 2024-01-01 | End: 2024-01-01

## 2024-01-01 RX ORDER — BUMETANIDE 0.25 MG/ML
1 INJECTION INTRAMUSCULAR; INTRAVENOUS
Qty: 20 | Refills: 0 | Status: DISCONTINUED | OUTPATIENT
Start: 2024-01-01 | End: 2024-01-01

## 2024-01-01 RX ORDER — DEXTROSE 50 % IN WATER 50 %
50 SYRINGE (ML) INTRAVENOUS ONCE
Refills: 0 | Status: COMPLETED | OUTPATIENT
Start: 2024-01-01 | End: 2024-01-01

## 2024-01-01 RX ORDER — ATORVASTATIN CALCIUM 80 MG/1
1 TABLET, FILM COATED ORAL
Qty: 0 | Refills: 0 | DISCHARGE

## 2024-01-01 RX ORDER — ONDANSETRON 8 MG/1
8 TABLET, FILM COATED ORAL ONCE
Refills: 0 | Status: COMPLETED | OUTPATIENT
Start: 2024-01-01 | End: 2024-01-01

## 2024-01-01 RX ORDER — FUROSEMIDE 40 MG
60 TABLET ORAL
Refills: 0 | Status: DISCONTINUED | OUTPATIENT
Start: 2024-01-01 | End: 2024-01-01

## 2024-01-01 RX ORDER — BUMETANIDE 0.25 MG/ML
2 INJECTION INTRAMUSCULAR; INTRAVENOUS ONCE
Refills: 0 | Status: COMPLETED | OUTPATIENT
Start: 2024-01-01 | End: 2024-01-01

## 2024-01-01 RX ORDER — VANCOMYCIN HCL 1 G
VIAL (EA) INTRAVENOUS
Refills: 0 | Status: DISCONTINUED | OUTPATIENT
Start: 2024-01-01 | End: 2024-01-01

## 2024-01-01 RX ORDER — FENTANYL CITRATE 50 UG/ML
50 INJECTION, SOLUTION INTRAMUSCULAR; INTRAVENOUS
Refills: 0 | Status: DISCONTINUED | OUTPATIENT
Start: 2024-01-01 | End: 2024-01-01

## 2024-01-01 RX ORDER — ACETAMINOPHEN 325 MG
1000 TABLET ORAL ONCE
Refills: 0 | Status: DISCONTINUED | OUTPATIENT
Start: 2024-01-01 | End: 2024-01-01

## 2024-01-01 RX ORDER — AMMONIUM LACTATE 12 %
1 LOTION (GRAM) TOPICAL
Refills: 0 | Status: DISCONTINUED | OUTPATIENT
Start: 2024-01-01 | End: 2024-01-01

## 2024-01-01 RX ORDER — APIXABAN 2.5 MG/1
1 TABLET, FILM COATED ORAL
Qty: 0 | Refills: 0 | DISCHARGE
Start: 2024-01-01

## 2024-01-01 RX ORDER — ALBUMIN HUMAN 5 %
100 INTRAVENOUS SOLUTION INTRAVENOUS ONCE
Refills: 0 | Status: COMPLETED | OUTPATIENT
Start: 2024-01-01 | End: 2024-01-01

## 2024-01-01 RX ORDER — SODIUM CHLORIDE 9 MG/ML
10 INJECTION INTRAMUSCULAR; INTRAVENOUS; SUBCUTANEOUS
Refills: 0 | Status: DISCONTINUED | OUTPATIENT
Start: 2024-01-01 | End: 2024-01-01

## 2024-01-01 RX ORDER — NYSTATIN CREAM 100000 [USP'U]/G
1 CREAM TOPICAL
Refills: 0 | Status: DISCONTINUED | OUTPATIENT
Start: 2024-01-01 | End: 2024-01-01

## 2024-01-01 RX ORDER — DEXTROSE 30 % IN WATER 30 %
12.5 VIAL (ML) INTRAVENOUS ONCE
Refills: 0 | Status: DISCONTINUED | OUTPATIENT
Start: 2024-01-01 | End: 2024-01-01

## 2024-01-01 RX ORDER — LOPERAMIDE HCL 2 MG
2 CAPSULE ORAL THREE TIMES A DAY
Refills: 0 | Status: DISCONTINUED | OUTPATIENT
Start: 2024-01-01 | End: 2024-01-01

## 2024-01-01 RX ORDER — ATORVASTATIN CALCIUM 80 MG/1
80 TABLET, FILM COATED ORAL
Qty: 90 | Refills: 3 | Status: DISCONTINUED | COMMUNITY
Start: 2023-01-01 | End: 2024-01-01

## 2024-01-01 RX ORDER — INSULIN HUMAN 100 [IU]/ML
5 INJECTION, SOLUTION SUBCUTANEOUS ONCE
Refills: 0 | Status: COMPLETED | OUTPATIENT
Start: 2024-01-01 | End: 2024-01-01

## 2024-01-01 RX ORDER — PYRIDOSTIGMINE BROMIDE 60 MG/5ML
60 SOLUTION ORAL
Refills: 0 | Status: DISCONTINUED | OUTPATIENT
Start: 2024-01-01 | End: 2024-01-01

## 2024-01-01 RX ORDER — VANCOMYCIN HYDROCHLORIDE 50 MG/ML
1500 KIT ORAL EVERY 24 HOURS
Refills: 0 | Status: DISCONTINUED | OUTPATIENT
Start: 2024-01-01 | End: 2024-01-01

## 2024-01-01 RX ORDER — DEXTROSE 50 % IN WATER 50 %
25 SYRINGE (ML) INTRAVENOUS ONCE
Refills: 0 | Status: COMPLETED | OUTPATIENT
Start: 2024-01-01 | End: 2024-01-01

## 2024-01-01 RX ORDER — DROXIDOPA 100 MG/1
100 CAPSULE ORAL EVERY 8 HOURS
Refills: 0 | Status: DISCONTINUED | OUTPATIENT
Start: 2024-01-01 | End: 2024-01-01

## 2024-01-01 RX ORDER — INSULIN GLARGINE 100 [IU]/ML
9 INJECTION, SOLUTION SUBCUTANEOUS
Refills: 0 | Status: DISCONTINUED | OUTPATIENT
Start: 2024-01-01 | End: 2024-01-01

## 2024-01-01 RX ORDER — MIDODRINE HYDROCHLORIDE 2.5 MG/1
5 TABLET ORAL THREE TIMES A DAY
Refills: 0 | Status: DISCONTINUED | OUTPATIENT
Start: 2024-01-01 | End: 2024-01-01

## 2024-01-01 RX ORDER — ASPIRIN/CALCIUM CARB/MAGNESIUM 324 MG
1 TABLET ORAL
Refills: 0 | DISCHARGE

## 2024-01-01 RX ORDER — VASOPRESSIN 20 UNIT/ML
0.04 VIAL (ML) INJECTION
Qty: 40 | Refills: 0 | Status: DISCONTINUED | OUTPATIENT
Start: 2024-01-01 | End: 2024-01-01

## 2024-01-01 RX ORDER — NYSTATIN 500MM UNIT
500000 POWDER (EA) MISCELLANEOUS
Refills: 0 | Status: DISCONTINUED | OUTPATIENT
Start: 2024-01-01 | End: 2024-01-01

## 2024-01-01 RX ORDER — LEVOTHYROXINE SODIUM 125 MCG
1 TABLET ORAL
Refills: 0 | DISCHARGE

## 2024-01-01 RX ORDER — FENTANYL CITRATE 50 UG/ML
25 INJECTION, SOLUTION INTRAMUSCULAR; INTRAVENOUS EVERY 4 HOURS
Refills: 0 | Status: DISCONTINUED | OUTPATIENT
Start: 2024-01-01 | End: 2024-01-01

## 2024-01-01 RX ORDER — DIPHENHYDRAMINE HCL 50 MG
50 CAPSULE ORAL EVERY 6 HOURS
Refills: 0 | Status: COMPLETED | OUTPATIENT
Start: 2024-01-01 | End: 2024-01-01

## 2024-01-01 RX ORDER — CASPOFUNGIN ACETATE 5 MG/ML
INJECTION, POWDER, LYOPHILIZED, FOR SOLUTION INTRAVENOUS
Refills: 0 | Status: DISCONTINUED | OUTPATIENT
Start: 2024-01-01 | End: 2024-01-01

## 2024-01-01 RX ORDER — CALAMINE AND ZINC OXIDE AND PHENOL 160; 10 MG/ML; MG/ML
1 LOTION TOPICAL
Refills: 0 | Status: DISCONTINUED | OUTPATIENT
Start: 2024-01-01 | End: 2024-01-01

## 2024-01-01 RX ORDER — DAPAGLIFLOZIN 10 MG/1
1 TABLET, FILM COATED ORAL
Qty: 30 | Refills: 0
Start: 2024-01-01 | End: 2024-01-01

## 2024-01-01 RX ORDER — DEXMEDETOMIDINE HYDROCHLORIDE 4 UG/ML
0.5 INJECTION, SOLUTION INTRAVENOUS
Qty: 400 | Refills: 0 | Status: DISCONTINUED | OUTPATIENT
Start: 2024-01-01 | End: 2024-01-01

## 2024-01-01 RX ORDER — INSULIN GLARGINE 100 [IU]/ML
12 INJECTION, SOLUTION SUBCUTANEOUS ONCE
Refills: 0 | Status: COMPLETED | OUTPATIENT
Start: 2024-01-01 | End: 2024-01-01

## 2024-01-01 RX ORDER — INSULIN HUMAN 100 [IU]/ML
3 INJECTION, SOLUTION SUBCUTANEOUS
Qty: 100 | Refills: 0 | Status: DISCONTINUED | OUTPATIENT
Start: 2024-01-01 | End: 2024-01-01

## 2024-01-01 RX ORDER — ATROPINE SULFATE 0.1 MG/ML
1 SYRINGE (ML) INJECTION ONCE
Refills: 0 | Status: COMPLETED | OUTPATIENT
Start: 2024-01-01 | End: 2024-01-01

## 2024-01-01 RX ORDER — CEFUROXIME AXETIL 250 MG
250 TABLET ORAL EVERY 12 HOURS
Refills: 0 | Status: DISCONTINUED | OUTPATIENT
Start: 2024-01-01 | End: 2024-01-01

## 2024-01-01 RX ORDER — MAGNESIUM, ALUMINUM HYDROXIDE 400-400
30 TABLET,CHEWABLE ORAL EVERY 4 HOURS
Refills: 0 | Status: DISCONTINUED | OUTPATIENT
Start: 2024-01-01 | End: 2024-01-01

## 2024-01-01 RX ORDER — INSULIN LISPRO 100 [IU]/ML
2 INJECTION, SOLUTION SUBCUTANEOUS
Refills: 0 | Status: DISCONTINUED | OUTPATIENT
Start: 2024-01-01 | End: 2024-01-01

## 2024-01-01 RX ORDER — PANTOPRAZOLE SODIUM 20 MG/1
40 TABLET, DELAYED RELEASE ORAL EVERY 24 HOURS
Refills: 0 | Status: DISCONTINUED | OUTPATIENT
Start: 2024-01-01 | End: 2024-01-01

## 2024-01-01 RX ORDER — ONDANSETRON HYDROCHLORIDE 2 MG/ML
4 INJECTION INTRAMUSCULAR; INTRAVENOUS EVERY 6 HOURS
Refills: 0 | Status: DISCONTINUED | OUTPATIENT
Start: 2024-01-01 | End: 2024-01-01

## 2024-01-01 RX ORDER — DOBUTAMINE HCL 250MG/20ML
7.5 VIAL (ML) INTRAVENOUS
Qty: 1000 | Refills: 0 | Status: DISCONTINUED | OUTPATIENT
Start: 2024-01-01 | End: 2024-01-01

## 2024-01-01 RX ORDER — DEXTROSE 50 % IN WATER 50 %
25 SYRINGE (ML) INTRAVENOUS
Qty: 0 | Refills: 0 | DISCHARGE
Start: 2024-01-01

## 2024-01-01 RX ORDER — CEFUROXIME AXETIL 250 MG
1 TABLET ORAL
Qty: 4 | Refills: 0
Start: 2024-01-01 | End: 2024-01-01

## 2024-01-01 RX ORDER — FUROSEMIDE 40 MG
80 TABLET ORAL EVERY 12 HOURS
Refills: 0 | Status: DISCONTINUED | OUTPATIENT
Start: 2024-01-01 | End: 2024-01-01

## 2024-01-01 RX ORDER — VASOPRESSIN 20 [USP'U]/ML
0.06 INJECTION INTRAVENOUS
Qty: 40 | Refills: 0 | Status: DISCONTINUED | OUTPATIENT
Start: 2024-01-01 | End: 2024-01-01

## 2024-01-01 RX ORDER — FENTANYL CITRATE 50 UG/ML
0.5 INJECTION, SOLUTION INTRAMUSCULAR; INTRAVENOUS
Qty: 2500 | Refills: 0 | Status: DISCONTINUED | OUTPATIENT
Start: 2024-01-01 | End: 2024-01-01

## 2024-01-01 RX ORDER — DEXTROSE MONOHYDRATE AND SODIUM CHLORIDE 5; .3 G/100ML; G/100ML
1000 INJECTION, SOLUTION INTRAVENOUS ONCE
Refills: 0 | Status: COMPLETED | OUTPATIENT
Start: 2024-01-01 | End: 2024-01-01

## 2024-01-01 RX ORDER — PSYLLIUM HUSK 3.4 G/7G
1 POWDER, FOR SOLUTION ORAL THREE TIMES A DAY
Refills: 0 | Status: DISCONTINUED | OUTPATIENT
Start: 2024-01-01 | End: 2024-01-01

## 2024-01-01 RX ORDER — VANCOMYCIN HCL 1 G
750 VIAL (EA) INTRAVENOUS EVERY 12 HOURS
Refills: 0 | Status: DISCONTINUED | OUTPATIENT
Start: 2024-01-01 | End: 2024-01-01

## 2024-01-01 RX ORDER — SOD,AMMONIUM,POTASSIUM LACTATE
1 CREAM (GRAM) TOPICAL
Qty: 0 | Refills: 0 | DISCHARGE
Start: 2024-01-01

## 2024-01-01 RX ORDER — LOPERAMIDE HCL 2 MG
1 TABLET ORAL
Qty: 0 | Refills: 0 | DISCHARGE
Start: 2024-01-01

## 2024-01-01 RX ORDER — LORATADINE 10 MG/1
10 TABLET ORAL DAILY
Refills: 0 | Status: DISCONTINUED | OUTPATIENT
Start: 2024-01-01 | End: 2024-01-01

## 2024-01-01 RX ORDER — VASOPRESSIN 20 [USP'U]/ML
0.01 INJECTION INTRAVENOUS
Qty: 40 | Refills: 0 | Status: DISCONTINUED | OUTPATIENT
Start: 2024-01-01 | End: 2024-01-01

## 2024-01-01 RX ORDER — INSULIN LISPRO 100 [IU]/ML
10 INJECTION, SOLUTION SUBCUTANEOUS ONCE
Refills: 0 | Status: COMPLETED | OUTPATIENT
Start: 2024-01-01 | End: 2024-01-01

## 2024-01-01 RX ORDER — APIXABAN 2.5 MG/1
10 TABLET, FILM COATED ORAL
Refills: 0 | Status: COMPLETED | OUTPATIENT
Start: 2024-01-01 | End: 2024-01-01

## 2024-01-01 RX ORDER — LEVOTHYROXINE SODIUM 125 MCG
1 TABLET ORAL
Qty: 0 | Refills: 0 | DISCHARGE
Start: 2024-01-01

## 2024-01-01 RX ORDER — ASPIRIN 325 MG/1
1 TABLET, FILM COATED ORAL
Qty: 0 | Refills: 0 | DISCHARGE
Start: 2024-01-01

## 2024-01-01 RX ORDER — INSULIN LISPRO 100 [IU]/ML
1 INJECTION, SOLUTION SUBCUTANEOUS
Qty: 0 | Refills: 0 | DISCHARGE
Start: 2024-01-01

## 2024-01-01 RX ORDER — PROPOFOL 10 MG/ML
10 INJECTION, EMULSION INTRAVENOUS
Qty: 1000 | Refills: 0 | Status: DISCONTINUED | OUTPATIENT
Start: 2024-01-01 | End: 2024-01-01

## 2024-01-01 RX ORDER — NYSTATIN 100000/G
1 POWDER (GRAM) TOPICAL
Qty: 0 | Refills: 0 | DISCHARGE
Start: 2024-01-01

## 2024-01-01 RX ORDER — MIDODRINE HYDROCHLORIDE 2.5 MG/1
20 TABLET ORAL EVERY 8 HOURS
Refills: 0 | Status: DISCONTINUED | OUTPATIENT
Start: 2024-01-01 | End: 2024-01-01

## 2024-01-01 RX ORDER — PSYLLIUM SEED (WITH DEXTROSE)
1 POWDER (GRAM) ORAL DAILY
Refills: 0 | Status: DISCONTINUED | OUTPATIENT
Start: 2024-01-01 | End: 2024-01-01

## 2024-01-01 RX ORDER — ALBUMIN HUMAN 25 %
250 VIAL (ML) INTRAVENOUS ONCE
Refills: 0 | Status: COMPLETED | OUTPATIENT
Start: 2024-01-01 | End: 2024-01-01

## 2024-01-01 RX ORDER — METOCLOPRAMIDE HCL 10 MG
10 TABLET ORAL EVERY 6 HOURS
Refills: 0 | Status: DISCONTINUED | OUTPATIENT
Start: 2024-01-01 | End: 2024-01-01

## 2024-01-01 RX ORDER — SODIUM ZIRCONIUM CYCLOSILICATE 10 G/10G
10 POWDER, FOR SUSPENSION ORAL ONCE
Refills: 0 | Status: COMPLETED | OUTPATIENT
Start: 2024-01-01 | End: 2024-01-01

## 2024-01-01 RX ORDER — CEFEPIME 1 G/1
2000 INJECTION, POWDER, FOR SOLUTION INTRAMUSCULAR; INTRAVENOUS EVERY 24 HOURS
Refills: 0 | Status: DISCONTINUED | OUTPATIENT
Start: 2024-01-01 | End: 2024-01-01

## 2024-01-01 RX ORDER — ONDANSETRON HYDROCHLORIDE 2 MG/ML
4 INJECTION INTRAMUSCULAR; INTRAVENOUS ONCE
Refills: 0 | Status: COMPLETED | OUTPATIENT
Start: 2024-01-01 | End: 2024-01-01

## 2024-01-01 RX ORDER — GLUCAGON HYDROCHLORIDE 1 MG/ML
1 INJECTION, POWDER, FOR SOLUTION INTRAMUSCULAR; INTRAVENOUS; SUBCUTANEOUS ONCE
Refills: 0 | Status: DISCONTINUED | OUTPATIENT
Start: 2024-01-01 | End: 2024-01-01

## 2024-01-01 RX ORDER — SOD,AMMONIUM,POTASSIUM LACTATE
1 CREAM (GRAM) TOPICAL
Refills: 0 | Status: DISCONTINUED | OUTPATIENT
Start: 2024-01-01 | End: 2024-01-01

## 2024-01-01 RX ORDER — EPLERENONE 50 MG/1
1 TABLET, FILM COATED ORAL
Qty: 0 | Refills: 0 | DISCHARGE

## 2024-01-01 RX ORDER — MIDODRINE HYDROCHLORIDE 2.5 MG/1
3 TABLET ORAL
Qty: 30 | Refills: 0
Start: 2024-01-01

## 2024-01-01 RX ORDER — FERROUS SULFATE 325(65) MG
300 TABLET ORAL DAILY
Refills: 0 | Status: DISCONTINUED | OUTPATIENT
Start: 2024-01-01 | End: 2024-01-01

## 2024-01-01 RX ORDER — FUROSEMIDE 40 MG
60 TABLET ORAL ONCE
Refills: 0 | Status: COMPLETED | OUTPATIENT
Start: 2024-01-01 | End: 2024-01-01

## 2024-01-01 RX ORDER — MUPIROCIN 20 MG/G
1 OINTMENT TOPICAL EVERY 12 HOURS
Refills: 0 | Status: DISCONTINUED | OUTPATIENT
Start: 2024-01-01 | End: 2024-01-01

## 2024-01-01 RX ORDER — DIPHENHYDRAMINE HCL 50 MG
25 CAPSULE ORAL EVERY 4 HOURS
Refills: 0 | Status: DISCONTINUED | OUTPATIENT
Start: 2024-01-01 | End: 2024-01-01

## 2024-01-01 RX ORDER — PANTOPRAZOLE SODIUM 40 MG/10ML
40 INJECTION, POWDER, FOR SOLUTION INTRAVENOUS DAILY
Refills: 0 | Status: DISCONTINUED | OUTPATIENT
Start: 2024-01-01 | End: 2024-01-01

## 2024-01-01 RX ORDER — INSULIN GLARGINE 100 [IU]/ML
12 INJECTION, SOLUTION SUBCUTANEOUS
Refills: 0 | Status: DISCONTINUED | OUTPATIENT
Start: 2024-01-01 | End: 2024-01-01

## 2024-01-01 RX ORDER — DEXMEDETOMIDINE HYDROCHLORIDE IN 0.9% SODIUM CHLORIDE 4 UG/ML
0.5 INJECTION INTRAVENOUS
Qty: 200 | Refills: 0 | Status: DISCONTINUED | OUTPATIENT
Start: 2024-01-01 | End: 2024-01-01

## 2024-01-01 RX ORDER — FENTANYL CITRATE 50 UG/ML
50 INJECTION, SOLUTION INTRAMUSCULAR; INTRAVENOUS EVERY 4 HOURS
Refills: 0 | Status: DISCONTINUED | OUTPATIENT
Start: 2024-01-01 | End: 2024-01-01

## 2024-01-01 RX ORDER — SODIUM,POTASSIUM PHOSPHATES 278-250MG
1 POWDER IN PACKET (EA) ORAL
Refills: 0 | Status: COMPLETED | OUTPATIENT
Start: 2024-01-01 | End: 2024-01-01

## 2024-01-01 RX ORDER — PSYLLIUM SEED (WITH DEXTROSE)
1 POWDER (GRAM) ORAL
Refills: 0 | Status: DISCONTINUED | OUTPATIENT
Start: 2024-01-01 | End: 2024-01-01

## 2024-01-01 RX ADMIN — MIDODRINE HYDROCHLORIDE 20 MILLIGRAM(S): 2.5 TABLET ORAL at 17:59

## 2024-01-01 RX ADMIN — NOREPINEPHRINE BITARTRATE 42.5 MICROGRAM(S)/KG/MIN: 1 INJECTION INTRAVENOUS at 08:07

## 2024-01-01 RX ADMIN — Medication 1: at 10:21

## 2024-01-01 RX ADMIN — HEPARIN SODIUM 5000 UNIT(S): 5000 INJECTION INTRAVENOUS; SUBCUTANEOUS at 21:41

## 2024-01-01 RX ADMIN — MIDODRINE HYDROCHLORIDE 10 MILLIGRAM(S): 2.5 TABLET ORAL at 12:27

## 2024-01-01 RX ADMIN — MIDODRINE HYDROCHLORIDE 20 MILLIGRAM(S): 10 TABLET ORAL at 19:07

## 2024-01-01 RX ADMIN — Medication 325 MILLIGRAM(S): at 05:10

## 2024-01-01 RX ADMIN — Medication 1: at 17:43

## 2024-01-01 RX ADMIN — MIDODRINE HYDROCHLORIDE 15 MILLIGRAM(S): 2.5 TABLET ORAL at 18:11

## 2024-01-01 RX ADMIN — HEPARIN SODIUM 0.6 UNIT(S)/HR: 5000 INJECTION INTRAVENOUS; SUBCUTANEOUS at 18:36

## 2024-01-01 RX ADMIN — PROPOFOL 8.98 MICROGRAM(S)/KG/MIN: 10 INJECTION, EMULSION INTRAVENOUS at 13:21

## 2024-01-01 RX ADMIN — Medication 650 MILLIGRAM(S): at 18:18

## 2024-01-01 RX ADMIN — Medication 1: at 17:45

## 2024-01-01 RX ADMIN — Medication 75 MICROGRAM(S): at 05:00

## 2024-01-01 RX ADMIN — MIDODRINE HYDROCHLORIDE 10 MILLIGRAM(S): 2.5 TABLET ORAL at 22:29

## 2024-01-01 RX ADMIN — PANTOPRAZOLE SODIUM 40 MILLIGRAM(S): 20 TABLET, DELAYED RELEASE ORAL at 06:21

## 2024-01-01 RX ADMIN — Medication 325 MILLIGRAM(S): at 05:41

## 2024-01-01 RX ADMIN — CHLORHEXIDINE GLUCONATE 1 APPLICATION(S): 213 SOLUTION TOPICAL at 21:16

## 2024-01-01 RX ADMIN — ATORVASTATIN CALCIUM 80 MILLIGRAM(S): 80 TABLET, FILM COATED ORAL at 21:47

## 2024-01-01 RX ADMIN — Medication 1 APPLICATION(S): at 21:59

## 2024-01-01 RX ADMIN — FLUDROCORTISONE ACETATE 0.2 MILLIGRAM(S): 0.1 TABLET ORAL at 05:34

## 2024-01-01 RX ADMIN — APIXABAN 5 MILLIGRAM(S): 2.5 TABLET, FILM COATED ORAL at 05:31

## 2024-01-01 RX ADMIN — Medication 1 APPLICATION(S): at 05:40

## 2024-01-01 RX ADMIN — VANCOMYCIN HYDROCHLORIDE 250 MILLIGRAM(S): KIT at 22:30

## 2024-01-01 RX ADMIN — FLUDROCORTISONE ACETATE 0.2 MILLIGRAM(S): 0.1 TABLET ORAL at 17:02

## 2024-01-01 RX ADMIN — Medication 1: at 16:27

## 2024-01-01 RX ADMIN — HEPARIN SODIUM 5000 UNIT(S): 5000 INJECTION INTRAVENOUS; SUBCUTANEOUS at 21:09

## 2024-01-01 RX ADMIN — PANTOPRAZOLE SODIUM 40 MILLIGRAM(S): 40 INJECTION, POWDER, FOR SOLUTION INTRAVENOUS at 11:22

## 2024-01-01 RX ADMIN — INSULIN GLARGINE 26 UNIT(S): 100 INJECTION, SOLUTION SUBCUTANEOUS at 21:25

## 2024-01-01 RX ADMIN — Medication 325 MILLIGRAM(S): at 05:09

## 2024-01-01 RX ADMIN — INSULIN LISPRO 1: 100 INJECTION, SOLUTION SUBCUTANEOUS at 06:48

## 2024-01-01 RX ADMIN — CEFTRIAXONE 100 MILLIGRAM(S): 500 INJECTION, POWDER, FOR SOLUTION INTRAMUSCULAR; INTRAVENOUS at 01:18

## 2024-01-01 RX ADMIN — INSULIN LISPRO 3: 100 INJECTION, SOLUTION SUBCUTANEOUS at 17:02

## 2024-01-01 RX ADMIN — Medication 50 MILLILITER(S): at 05:18

## 2024-01-01 RX ADMIN — MIDODRINE HYDROCHLORIDE 30 MILLIGRAM(S): 10 TABLET ORAL at 05:20

## 2024-01-01 RX ADMIN — HEPARIN SODIUM 5000 UNIT(S): 5000 INJECTION INTRAVENOUS; SUBCUTANEOUS at 07:01

## 2024-01-01 RX ADMIN — ATORVASTATIN CALCIUM 80 MILLIGRAM(S): 80 TABLET, FILM COATED ORAL at 17:10

## 2024-01-01 RX ADMIN — Medication 1 APPLICATION(S): at 17:31

## 2024-01-01 RX ADMIN — MUPIROCIN 1 APPLICATION(S): 20 OINTMENT TOPICAL at 06:58

## 2024-01-01 RX ADMIN — Medication 325 MILLIGRAM(S): at 16:48

## 2024-01-01 RX ADMIN — HEPARIN SODIUM 5000 UNIT(S): 5000 INJECTION INTRAVENOUS; SUBCUTANEOUS at 05:40

## 2024-01-01 RX ADMIN — INSULIN GLARGINE 14 UNIT(S): 100 INJECTION, SOLUTION SUBCUTANEOUS at 22:22

## 2024-01-01 RX ADMIN — Medication 500000 UNIT(S): at 06:19

## 2024-01-01 RX ADMIN — Medication 75 MICROGRAM(S): at 05:27

## 2024-01-01 RX ADMIN — APIXABAN 10 MILLIGRAM(S): 2.5 TABLET, FILM COATED ORAL at 09:10

## 2024-01-01 RX ADMIN — ATORVASTATIN CALCIUM 80 MILLIGRAM(S): 80 TABLET, FILM COATED ORAL at 21:30

## 2024-01-01 RX ADMIN — Medication 3: at 12:57

## 2024-01-01 RX ADMIN — Medication 325 MILLIGRAM(S): at 17:49

## 2024-01-01 RX ADMIN — Medication 3: at 08:22

## 2024-01-01 RX ADMIN — Medication 2: at 08:00

## 2024-01-01 RX ADMIN — Medication 1: at 12:05

## 2024-01-01 RX ADMIN — DROXIDOPA 200 MILLIGRAM(S): 100 CAPSULE ORAL at 17:19

## 2024-01-01 RX ADMIN — Medication 75 MICROGRAM(S): at 05:32

## 2024-01-01 RX ADMIN — ATORVASTATIN CALCIUM 80 MILLIGRAM(S): 80 TABLET, FILM COATED ORAL at 22:29

## 2024-01-01 RX ADMIN — Medication 75 MICROGRAM(S): at 05:39

## 2024-01-01 RX ADMIN — Medication 3: at 22:15

## 2024-01-01 RX ADMIN — MIDODRINE HYDROCHLORIDE 15 MILLIGRAM(S): 2.5 TABLET ORAL at 05:34

## 2024-01-01 RX ADMIN — CHLORHEXIDINE GLUCONATE 1 APPLICATION(S): 213 SOLUTION TOPICAL at 22:20

## 2024-01-01 RX ADMIN — CALAMINE AND ZINC OXIDE AND PHENOL 1 APPLICATION(S): 160; 10 LOTION TOPICAL at 12:30

## 2024-01-01 RX ADMIN — Medication 325 MILLIGRAM(S): at 17:22

## 2024-01-01 RX ADMIN — Medication 325 MILLIGRAM(S): at 05:25

## 2024-01-01 RX ADMIN — DROXIDOPA 600 MILLIGRAM(S): 100 CAPSULE ORAL at 05:33

## 2024-01-01 RX ADMIN — Medication 1 TABLET(S): at 12:21

## 2024-01-01 RX ADMIN — Medication 4: at 09:17

## 2024-01-01 RX ADMIN — Medication 2: at 08:42

## 2024-01-01 RX ADMIN — MUPIROCIN 1 APPLICATION(S): 20 OINTMENT TOPICAL at 18:04

## 2024-01-01 RX ADMIN — Medication 85 MILLIMOLE(S): at 02:11

## 2024-01-01 RX ADMIN — APIXABAN 5 MILLIGRAM(S): 2.5 TABLET, FILM COATED ORAL at 05:38

## 2024-01-01 RX ADMIN — Medication 12: at 10:52

## 2024-01-01 RX ADMIN — Medication 1 TABLET(S): at 12:07

## 2024-01-01 RX ADMIN — Medication 81 MILLIGRAM(S): at 11:30

## 2024-01-01 RX ADMIN — INSULIN LISPRO 4: 100 INJECTION, SOLUTION SUBCUTANEOUS at 17:28

## 2024-01-01 RX ADMIN — Medication 325 MILLIGRAM(S): at 18:41

## 2024-01-01 RX ADMIN — Medication 325 MILLIGRAM(S): at 05:31

## 2024-01-01 RX ADMIN — Medication 75 MICROGRAM(S): at 05:40

## 2024-01-01 RX ADMIN — Medication 15.5 MICROGRAM(S)/KG/MIN: at 12:32

## 2024-01-01 RX ADMIN — Medication 325 MILLIGRAM(S): at 17:28

## 2024-01-01 RX ADMIN — Medication 1 APPLICATION(S): at 05:54

## 2024-01-01 RX ADMIN — PYRIDOSTIGMINE BROMIDE 60 MILLIGRAM(S): 60 TABLET ORAL at 17:24

## 2024-01-01 RX ADMIN — Medication 500000 UNIT(S): at 23:42

## 2024-01-01 RX ADMIN — Medication 5 UNIT(S): at 17:37

## 2024-01-01 RX ADMIN — CETIRIZINE HYDROCHLORIDE 10 MILLIGRAM(S): 10 TABLET ORAL at 12:40

## 2024-01-01 RX ADMIN — Medication 50 MILLILITER(S): at 17:21

## 2024-01-01 RX ADMIN — NYSTATIN CREAM 1 APPLICATION(S): 100000 CREAM TOPICAL at 06:09

## 2024-01-01 RX ADMIN — CALAMINE AND ZINC OXIDE AND PHENOL 1 APPLICATION(S): 160; 10 LOTION TOPICAL at 21:42

## 2024-01-01 RX ADMIN — Medication 325 MILLIGRAM(S): at 17:45

## 2024-01-01 RX ADMIN — Medication 15 MILLILITER(S): at 17:11

## 2024-01-01 RX ADMIN — ERYTHROPOIETIN 10000 UNIT(S): 10000 INJECTION, SOLUTION INTRAVENOUS; SUBCUTANEOUS at 15:05

## 2024-01-01 RX ADMIN — Medication 325 MILLIGRAM(S): at 06:13

## 2024-01-01 RX ADMIN — HEPARIN SODIUM 5000 UNIT(S): 5000 INJECTION INTRAVENOUS; SUBCUTANEOUS at 21:47

## 2024-01-01 RX ADMIN — Medication 250 MILLIGRAM(S): at 12:07

## 2024-01-01 RX ADMIN — Medication 10.3 MICROGRAM(S)/KG/MIN: at 02:17

## 2024-01-01 RX ADMIN — Medication 5 UNIT(S): at 09:29

## 2024-01-01 RX ADMIN — Medication 500000 UNIT(S): at 17:11

## 2024-01-01 RX ADMIN — MIDODRINE HYDROCHLORIDE 15 MILLIGRAM(S): 2.5 TABLET ORAL at 06:06

## 2024-01-01 RX ADMIN — Medication 500 MILLIGRAM(S): at 12:09

## 2024-01-01 RX ADMIN — MIDODRINE HYDROCHLORIDE 15 MILLIGRAM(S): 2.5 TABLET ORAL at 17:57

## 2024-01-01 RX ADMIN — SODIUM CHLORIDE 75 MILLILITER(S): 9 INJECTION, SOLUTION INTRAVENOUS at 10:05

## 2024-01-01 RX ADMIN — CALAMINE AND ZINC OXIDE AND PHENOL 1 APPLICATION(S): 160; 10 LOTION TOPICAL at 22:44

## 2024-01-01 RX ADMIN — Medication 1 APPLICATION(S): at 16:26

## 2024-01-01 RX ADMIN — DROXIDOPA 600 MILLIGRAM(S): 200 CAPSULE ORAL at 19:45

## 2024-01-01 RX ADMIN — INSULIN LISPRO 5 UNIT(S): 100 INJECTION, SOLUTION SUBCUTANEOUS at 08:53

## 2024-01-01 RX ADMIN — NYSTATIN CREAM 1 APPLICATION(S): 100000 CREAM TOPICAL at 17:22

## 2024-01-01 RX ADMIN — Medication 1 TABLET(S): at 12:05

## 2024-01-01 RX ADMIN — MIDODRINE HYDROCHLORIDE 15 MILLIGRAM(S): 2.5 TABLET ORAL at 18:41

## 2024-01-01 RX ADMIN — CALAMINE AND ZINC OXIDE AND PHENOL 1 APPLICATION(S): 160; 10 LOTION TOPICAL at 07:44

## 2024-01-01 RX ADMIN — MIDODRINE HYDROCHLORIDE 15 MILLIGRAM(S): 2.5 TABLET ORAL at 13:22

## 2024-01-01 RX ADMIN — INSULIN GLARGINE 10 UNIT(S): 100 INJECTION, SOLUTION SUBCUTANEOUS at 18:38

## 2024-01-01 RX ADMIN — Medication 1 APPLICATION(S): at 18:04

## 2024-01-01 RX ADMIN — Medication 1 UNIT(S)/HR: at 05:43

## 2024-01-01 RX ADMIN — ASPIRIN 81 MILLIGRAM(S): 325 TABLET, FILM COATED ORAL at 12:41

## 2024-01-01 RX ADMIN — Medication 75 MICROGRAM(S): at 05:09

## 2024-01-01 RX ADMIN — Medication 1 APPLICATION(S): at 05:29

## 2024-01-01 RX ADMIN — HEPARIN SODIUM 0.6 UNIT(S)/HR: 5000 INJECTION INTRAVENOUS; SUBCUTANEOUS at 21:50

## 2024-01-01 RX ADMIN — Medication 1 PACKET(S): at 16:21

## 2024-01-01 RX ADMIN — CALAMINE AND ZINC OXIDE AND PHENOL 1 APPLICATION(S): 160; 10 LOTION TOPICAL at 17:20

## 2024-01-01 RX ADMIN — Medication 325 MILLIGRAM(S): at 17:26

## 2024-01-01 RX ADMIN — HEPARIN SODIUM 5000 UNIT(S): 5000 INJECTION INTRAVENOUS; SUBCUTANEOUS at 17:21

## 2024-01-01 RX ADMIN — ATORVASTATIN CALCIUM 80 MILLIGRAM(S): 80 TABLET, FILM COATED ORAL at 21:25

## 2024-01-01 RX ADMIN — Medication 325 MILLIGRAM(S): at 17:15

## 2024-01-01 RX ADMIN — Medication 75 MICROGRAM(S): at 06:19

## 2024-01-01 RX ADMIN — MIDODRINE HYDROCHLORIDE 30 MILLIGRAM(S): 2.5 TABLET ORAL at 13:32

## 2024-01-01 RX ADMIN — CEFTRIAXONE 100 MILLIGRAM(S): 500 INJECTION, POWDER, FOR SOLUTION INTRAMUSCULAR; INTRAVENOUS at 08:37

## 2024-01-01 RX ADMIN — CEFTRIAXONE 100 MILLIGRAM(S): 500 INJECTION, POWDER, FOR SOLUTION INTRAMUSCULAR; INTRAVENOUS at 17:27

## 2024-01-01 RX ADMIN — Medication 50 MILLIGRAM(S): at 17:39

## 2024-01-01 RX ADMIN — DEXTROSE MONOHYDRATE AND SODIUM CHLORIDE 65 MILLILITER(S): 5; .3 INJECTION, SOLUTION INTRAVENOUS at 09:07

## 2024-01-01 RX ADMIN — Medication 325 MILLIGRAM(S): at 17:05

## 2024-01-01 RX ADMIN — CETIRIZINE HYDROCHLORIDE 10 MILLIGRAM(S): 10 TABLET ORAL at 13:49

## 2024-01-01 RX ADMIN — MIDODRINE HYDROCHLORIDE 15 MILLIGRAM(S): 2.5 TABLET ORAL at 13:24

## 2024-01-01 RX ADMIN — DROXIDOPA 600 MILLIGRAM(S): 100 CAPSULE ORAL at 21:57

## 2024-01-01 RX ADMIN — Medication 1: at 09:35

## 2024-01-01 RX ADMIN — Medication 1 APPLICATION(S): at 21:42

## 2024-01-01 RX ADMIN — Medication 325 MILLIGRAM(S): at 17:57

## 2024-01-01 RX ADMIN — Medication 50 MILLIGRAM(S): at 06:54

## 2024-01-01 RX ADMIN — Medication 500000 UNIT(S): at 05:27

## 2024-01-01 RX ADMIN — Medication 20 MILLIGRAM(S): at 13:58

## 2024-01-01 RX ADMIN — VASOPRESSIN 9 UNIT(S)/MIN: 20 INJECTION INTRAVENOUS at 13:21

## 2024-01-01 RX ADMIN — Medication 50 MILLILITER(S): at 06:17

## 2024-01-01 RX ADMIN — Medication 8 UNIT(S): at 08:23

## 2024-01-01 RX ADMIN — Medication 1 PACKET(S): at 21:45

## 2024-01-01 RX ADMIN — Medication 8 UNIT(S): at 09:00

## 2024-01-01 RX ADMIN — Medication 5 UNIT(S): at 13:39

## 2024-01-01 RX ADMIN — INSULIN GLARGINE 9 UNIT(S): 100 INJECTION, SOLUTION SUBCUTANEOUS at 12:10

## 2024-01-01 RX ADMIN — Medication 1 TABLET(S): at 12:49

## 2024-01-01 RX ADMIN — Medication 80 MILLIGRAM(S): at 17:22

## 2024-01-01 RX ADMIN — Medication 500000 UNIT(S): at 06:24

## 2024-01-01 RX ADMIN — ERYTHROPOIETIN 10000 UNIT(S): 10000 INJECTION, SOLUTION INTRAVENOUS; SUBCUTANEOUS at 17:29

## 2024-01-01 RX ADMIN — Medication 1 APPLICATION(S): at 05:45

## 2024-01-01 RX ADMIN — Medication 1 APPLICATION(S): at 21:57

## 2024-01-01 RX ADMIN — MUPIROCIN 1 APPLICATION(S): 20 OINTMENT TOPICAL at 05:17

## 2024-01-01 RX ADMIN — Medication 650 MILLIGRAM(S): at 17:44

## 2024-01-01 RX ADMIN — Medication 2 MILLIGRAM(S): at 13:35

## 2024-01-01 RX ADMIN — Medication 1: at 18:10

## 2024-01-01 RX ADMIN — DEXMEDETOMIDINE HYDROCHLORIDE 17.2 MICROGRAM(S)/KG/HR: 4 INJECTION, SOLUTION INTRAVENOUS at 02:38

## 2024-01-01 RX ADMIN — Medication 2: at 02:54

## 2024-01-01 RX ADMIN — ASPIRIN 81 MILLIGRAM(S): 325 TABLET, FILM COATED ORAL at 12:37

## 2024-01-01 RX ADMIN — Medication 75 MICROGRAM(S): at 05:30

## 2024-01-01 RX ADMIN — Medication 1 TABLET(S): at 11:50

## 2024-01-01 RX ADMIN — Medication 75 MICROGRAM(S): at 05:12

## 2024-01-01 RX ADMIN — HEPARIN SODIUM 5000 UNIT(S): 5000 INJECTION INTRAVENOUS; SUBCUTANEOUS at 06:18

## 2024-01-01 RX ADMIN — MIDODRINE HYDROCHLORIDE 30 MILLIGRAM(S): 2.5 TABLET ORAL at 09:20

## 2024-01-01 RX ADMIN — DROXIDOPA 200 MILLIGRAM(S): 100 CAPSULE ORAL at 17:25

## 2024-01-01 RX ADMIN — Medication 4 UNIT(S): at 17:22

## 2024-01-01 RX ADMIN — Medication 1 TABLET(S): at 12:54

## 2024-01-01 RX ADMIN — APIXABAN 5 MILLIGRAM(S): 2.5 TABLET, FILM COATED ORAL at 17:48

## 2024-01-01 RX ADMIN — Medication 325 MILLIGRAM(S): at 17:06

## 2024-01-01 RX ADMIN — Medication 81 MILLIGRAM(S): at 09:14

## 2024-01-01 RX ADMIN — Medication 5 UNIT(S): at 12:57

## 2024-01-01 RX ADMIN — Medication 1: at 12:41

## 2024-01-01 RX ADMIN — VANCOMYCIN HYDROCHLORIDE 300 MILLIGRAM(S): KIT at 08:57

## 2024-01-01 RX ADMIN — CLOPIDOGREL BISULFATE 600 MILLIGRAM(S): 75 TABLET, FILM COATED ORAL at 09:10

## 2024-01-01 RX ADMIN — Medication 325 MILLIGRAM(S): at 06:40

## 2024-01-01 RX ADMIN — Medication 0.1 MILLIGRAM(S): at 06:40

## 2024-01-01 RX ADMIN — Medication 5 UNIT(S): at 16:47

## 2024-01-01 RX ADMIN — PANTOPRAZOLE SODIUM 40 MILLIGRAM(S): 20 TABLET, DELAYED RELEASE ORAL at 15:30

## 2024-01-01 RX ADMIN — Medication 3: at 18:15

## 2024-01-01 RX ADMIN — SENNA PLUS 2 TABLET(S): 8.6 TABLET ORAL at 21:09

## 2024-01-01 RX ADMIN — FLUDROCORTISONE ACETATE 0.2 MILLIGRAM(S): 0.1 TABLET ORAL at 05:36

## 2024-01-01 RX ADMIN — Medication 56 MICROGRAM(S): at 22:32

## 2024-01-01 RX ADMIN — Medication 1 APPLICATION(S): at 17:39

## 2024-01-01 RX ADMIN — Medication 500000 UNIT(S): at 23:07

## 2024-01-01 RX ADMIN — PANTOPRAZOLE SODIUM 40 MILLIGRAM(S): 20 TABLET, DELAYED RELEASE ORAL at 06:35

## 2024-01-01 RX ADMIN — Medication 2 MILLIGRAM(S): at 09:32

## 2024-01-01 RX ADMIN — BUMETANIDE 2.5 MG/HR: 0.25 INJECTION INTRAMUSCULAR; INTRAVENOUS at 19:50

## 2024-01-01 RX ADMIN — VASOPRESSIN 3 UNIT(S)/MIN: 20 INJECTION INTRAVENOUS at 19:33

## 2024-01-01 RX ADMIN — Medication 1 PACKET(S): at 09:02

## 2024-01-01 RX ADMIN — Medication 75 MICROGRAM(S): at 07:33

## 2024-01-01 RX ADMIN — Medication 81 MILLIGRAM(S): at 12:48

## 2024-01-01 RX ADMIN — Medication 1: at 17:02

## 2024-01-01 RX ADMIN — Medication 1: at 22:13

## 2024-01-01 RX ADMIN — HEPARIN SODIUM 5000 UNIT(S): 5000 INJECTION INTRAVENOUS; SUBCUTANEOUS at 22:25

## 2024-01-01 RX ADMIN — FAMOTIDINE 104 MILLIGRAM(S): 10 INJECTION INTRAVENOUS at 12:41

## 2024-01-01 RX ADMIN — DROXIDOPA 600 MILLIGRAM(S): 100 CAPSULE ORAL at 11:50

## 2024-01-01 RX ADMIN — Medication 80 MILLIGRAM(S): at 17:07

## 2024-01-01 RX ADMIN — Medication 500 MILLIGRAM(S): at 13:06

## 2024-01-01 RX ADMIN — Medication 1: at 17:34

## 2024-01-01 RX ADMIN — MUPIROCIN 1 APPLICATION(S): 20 OINTMENT TOPICAL at 05:53

## 2024-01-01 RX ADMIN — INSULIN GLARGINE 34 UNIT(S): 100 INJECTION, SOLUTION SUBCUTANEOUS at 22:10

## 2024-01-01 RX ADMIN — Medication 325 MILLIGRAM(S): at 17:29

## 2024-01-01 RX ADMIN — CEFTRIAXONE 100 MILLIGRAM(S): 500 INJECTION, POWDER, FOR SOLUTION INTRAMUSCULAR; INTRAVENOUS at 18:25

## 2024-01-01 RX ADMIN — BUMETANIDE 2.5 MG/HR: 0.25 INJECTION INTRAMUSCULAR; INTRAVENOUS at 17:28

## 2024-01-01 RX ADMIN — PROPOFOL 24.7 MICROGRAM(S)/KG/MIN: 10 INJECTION, EMULSION INTRAVENOUS at 02:38

## 2024-01-01 RX ADMIN — Medication 0.1 MILLIGRAM(S): at 08:12

## 2024-01-01 RX ADMIN — MIDODRINE HYDROCHLORIDE 15 MILLIGRAM(S): 2.5 TABLET ORAL at 11:36

## 2024-01-01 RX ADMIN — Medication 1: at 02:34

## 2024-01-01 RX ADMIN — APIXABAN 10 MILLIGRAM(S): 2.5 TABLET, FILM COATED ORAL at 22:21

## 2024-01-01 RX ADMIN — MUPIROCIN 1 APPLICATION(S): 20 OINTMENT TOPICAL at 05:31

## 2024-01-01 RX ADMIN — INSULIN GLARGINE 6 UNIT(S): 100 INJECTION, SOLUTION SUBCUTANEOUS at 23:26

## 2024-01-01 RX ADMIN — ONDANSETRON 4 MILLIGRAM(S): 8 TABLET, FILM COATED ORAL at 01:18

## 2024-01-01 RX ADMIN — Medication 325 MILLIGRAM(S): at 18:23

## 2024-01-01 RX ADMIN — PYRIDOSTIGMINE BROMIDE 60 MILLIGRAM(S): 60 SOLUTION ORAL at 18:02

## 2024-01-01 RX ADMIN — DEXMEDETOMIDINE HYDROCHLORIDE 17.2 MICROGRAM(S)/KG/HR: 4 INJECTION, SOLUTION INTRAVENOUS at 23:33

## 2024-01-01 RX ADMIN — INSULIN GLARGINE 22 UNIT(S): 100 INJECTION, SOLUTION SUBCUTANEOUS at 21:43

## 2024-01-01 RX ADMIN — MIDODRINE HYDROCHLORIDE 30 MILLIGRAM(S): 10 TABLET ORAL at 06:16

## 2024-01-01 RX ADMIN — Medication 5 UNIT(S): at 12:54

## 2024-01-01 RX ADMIN — Medication 100 MILLIGRAM(S): at 17:49

## 2024-01-01 RX ADMIN — INSULIN GLARGINE 34 UNIT(S): 100 INJECTION, SOLUTION SUBCUTANEOUS at 22:30

## 2024-01-01 RX ADMIN — INSULIN GLARGINE 6 UNIT(S): 100 INJECTION, SOLUTION SUBCUTANEOUS at 22:05

## 2024-01-01 RX ADMIN — Medication 1 APPLICATION(S): at 18:18

## 2024-01-01 RX ADMIN — Medication 325 MILLIGRAM(S): at 18:26

## 2024-01-01 RX ADMIN — PYRIDOSTIGMINE BROMIDE 60 MILLIGRAM(S): 60 TABLET ORAL at 08:11

## 2024-01-01 RX ADMIN — Medication 0.1 MILLIGRAM(S): at 05:22

## 2024-01-01 RX ADMIN — Medication 325 MILLIGRAM(S): at 12:19

## 2024-01-01 RX ADMIN — ATORVASTATIN CALCIUM 80 MILLIGRAM(S): 80 TABLET, FILM COATED ORAL at 21:42

## 2024-01-01 RX ADMIN — Medication 500000 UNIT(S): at 17:38

## 2024-01-01 RX ADMIN — Medication 8 UNIT(S): at 07:42

## 2024-01-01 RX ADMIN — Medication 75 MICROGRAM(S): at 05:11

## 2024-01-01 RX ADMIN — Medication 1 APPLICATION(S): at 12:34

## 2024-01-01 RX ADMIN — Medication 100 MILLIGRAM(S): at 06:38

## 2024-01-01 RX ADMIN — CALAMINE AND ZINC OXIDE AND PHENOL 1 APPLICATION(S): 160; 10 LOTION TOPICAL at 13:05

## 2024-01-01 RX ADMIN — HEPARIN SODIUM 0.6 UNIT(S)/HR: 5000 INJECTION INTRAVENOUS; SUBCUTANEOUS at 19:50

## 2024-01-01 RX ADMIN — Medication 50 MILLIGRAM(S): at 16:10

## 2024-01-01 RX ADMIN — Medication 6 UNIT(S): at 08:53

## 2024-01-01 RX ADMIN — BUMETANIDE 2.5 MG/HR: 0.25 INJECTION INTRAMUSCULAR; INTRAVENOUS at 08:54

## 2024-01-01 RX ADMIN — Medication 1 APPLICATION(S): at 13:18

## 2024-01-01 RX ADMIN — Medication 2: at 17:07

## 2024-01-01 RX ADMIN — MUPIROCIN 1 APPLICATION(S): 20 OINTMENT TOPICAL at 06:12

## 2024-01-01 RX ADMIN — Medication 81 MILLIGRAM(S): at 12:25

## 2024-01-01 RX ADMIN — Medication 1 TABLET(S): at 14:24

## 2024-01-01 RX ADMIN — HEPARIN SODIUM 5000 UNIT(S): 5000 INJECTION INTRAVENOUS; SUBCUTANEOUS at 22:14

## 2024-01-01 RX ADMIN — DROXIDOPA 200 MILLIGRAM(S): 100 CAPSULE ORAL at 11:36

## 2024-01-01 RX ADMIN — Medication 75 MICROGRAM(S): at 06:10

## 2024-01-01 RX ADMIN — Medication 6 UNIT(S): at 09:16

## 2024-01-01 RX ADMIN — MIDODRINE HYDROCHLORIDE 15 MILLIGRAM(S): 2.5 TABLET ORAL at 13:01

## 2024-01-01 RX ADMIN — Medication 4: at 09:11

## 2024-01-01 RX ADMIN — Medication 500000 UNIT(S): at 05:37

## 2024-01-01 RX ADMIN — Medication 10 MILLIGRAM(S): at 17:23

## 2024-01-01 RX ADMIN — Medication 325 MILLIGRAM(S): at 09:12

## 2024-01-01 RX ADMIN — Medication 1 TABLET(S): at 12:57

## 2024-01-01 RX ADMIN — Medication 1 APPLICATION(S): at 17:41

## 2024-01-01 RX ADMIN — Medication 325 MILLIGRAM(S): at 05:30

## 2024-01-01 RX ADMIN — INSULIN GLARGINE 12 UNIT(S): 100 INJECTION, SOLUTION SUBCUTANEOUS at 11:32

## 2024-01-01 RX ADMIN — Medication 40 MILLIGRAM(S): at 06:21

## 2024-01-01 RX ADMIN — PANTOPRAZOLE SODIUM 40 MILLIGRAM(S): 20 TABLET, DELAYED RELEASE ORAL at 17:42

## 2024-01-01 RX ADMIN — Medication 75 MICROGRAM(S): at 05:20

## 2024-01-01 RX ADMIN — DEXMEDETOMIDINE HYDROCHLORIDE 17.2 MICROGRAM(S)/KG/HR: 4 INJECTION, SOLUTION INTRAVENOUS at 20:19

## 2024-01-01 RX ADMIN — Medication 75 MICROGRAM(S): at 06:20

## 2024-01-01 RX ADMIN — Medication 2: at 12:59

## 2024-01-01 RX ADMIN — Medication 1 TABLET(S): at 13:25

## 2024-01-01 RX ADMIN — APIXABAN 5 MILLIGRAM(S): 2.5 TABLET, FILM COATED ORAL at 05:25

## 2024-01-01 RX ADMIN — FLUDROCORTISONE ACETATE 0.2 MILLIGRAM(S): 0.1 TABLET ORAL at 17:42

## 2024-01-01 RX ADMIN — DROXIDOPA 600 MILLIGRAM(S): 100 CAPSULE ORAL at 17:48

## 2024-01-01 RX ADMIN — CETIRIZINE HYDROCHLORIDE 10 MILLIGRAM(S): 10 TABLET ORAL at 13:06

## 2024-01-01 RX ADMIN — DROXIDOPA 600 MILLIGRAM(S): 200 CAPSULE ORAL at 17:24

## 2024-01-01 RX ADMIN — Medication 325 MILLIGRAM(S): at 10:42

## 2024-01-01 RX ADMIN — Medication 650 MILLIGRAM(S): at 00:31

## 2024-01-01 RX ADMIN — Medication 10 UNIT(S): at 18:38

## 2024-01-01 RX ADMIN — INSULIN GLARGINE 12 UNIT(S): 100 INJECTION, SOLUTION SUBCUTANEOUS at 22:35

## 2024-01-01 RX ADMIN — MIDODRINE HYDROCHLORIDE 30 MILLIGRAM(S): 2.5 TABLET ORAL at 09:27

## 2024-01-01 RX ADMIN — Medication 1: at 22:25

## 2024-01-01 RX ADMIN — HEPARIN SODIUM 5000 UNIT(S): 5000 INJECTION INTRAVENOUS; SUBCUTANEOUS at 05:37

## 2024-01-01 RX ADMIN — Medication 75 MICROGRAM(S): at 05:45

## 2024-01-01 RX ADMIN — MIDODRINE HYDROCHLORIDE 20 MILLIGRAM(S): 2.5 TABLET ORAL at 17:41

## 2024-01-01 RX ADMIN — Medication 1 TABLET(S): at 12:30

## 2024-01-01 RX ADMIN — Medication 80 MILLIGRAM(S): at 17:20

## 2024-01-01 RX ADMIN — Medication 500 MILLIGRAM(S): at 11:45

## 2024-01-01 RX ADMIN — INSULIN GLARGINE 10 UNIT(S): 100 INJECTION, SOLUTION SUBCUTANEOUS at 21:45

## 2024-01-01 RX ADMIN — HEPARIN SODIUM 5000 UNIT(S): 5000 INJECTION INTRAVENOUS; SUBCUTANEOUS at 21:24

## 2024-01-01 RX ADMIN — MIDODRINE HYDROCHLORIDE 20 MILLIGRAM(S): 2.5 TABLET ORAL at 17:21

## 2024-01-01 RX ADMIN — Medication 25 GRAM(S): at 08:53

## 2024-01-01 RX ADMIN — CALAMINE AND ZINC OXIDE AND PHENOL 1 APPLICATION(S): 160; 10 LOTION TOPICAL at 14:02

## 2024-01-01 RX ADMIN — Medication 325 MILLIGRAM(S): at 09:14

## 2024-01-01 RX ADMIN — Medication 8 UNIT(S): at 08:25

## 2024-01-01 RX ADMIN — Medication 81 MILLIGRAM(S): at 12:52

## 2024-01-01 RX ADMIN — Medication 75 MICROGRAM(S): at 06:40

## 2024-01-01 RX ADMIN — DROXIDOPA 600 MILLIGRAM(S): 100 CAPSULE ORAL at 12:49

## 2024-01-01 RX ADMIN — MIDODRINE HYDROCHLORIDE 20 MILLIGRAM(S): 2.5 TABLET ORAL at 17:07

## 2024-01-01 RX ADMIN — CHLORHEXIDINE GLUCONATE 1 APPLICATION(S): 213 SOLUTION TOPICAL at 21:47

## 2024-01-01 RX ADMIN — Medication 1 TABLET(S): at 13:06

## 2024-01-01 RX ADMIN — Medication 50 MILLILITER(S): at 18:19

## 2024-01-01 RX ADMIN — Medication 50 MILLIGRAM(S): at 13:04

## 2024-01-01 RX ADMIN — Medication 75 MICROGRAM(S): at 06:16

## 2024-01-01 RX ADMIN — FLUDROCORTISONE ACETATE 0.1 MILLIGRAM(S): 0.1 TABLET ORAL at 17:54

## 2024-01-01 RX ADMIN — Medication 50 MILLIGRAM(S): at 00:05

## 2024-01-01 RX ADMIN — Medication 6 UNIT(S): at 13:10

## 2024-01-01 RX ADMIN — Medication 10 UNIT(S): at 13:20

## 2024-01-01 RX ADMIN — DEXMEDETOMIDINE HYDROCHLORIDE 17.2 MICROGRAM(S)/KG/HR: 4 INJECTION, SOLUTION INTRAVENOUS at 05:33

## 2024-01-01 RX ADMIN — DEXMEDETOMIDINE HYDROCHLORIDE 17.2 MICROGRAM(S)/KG/HR: 4 INJECTION, SOLUTION INTRAVENOUS at 08:08

## 2024-01-01 RX ADMIN — MIDODRINE HYDROCHLORIDE 10 MILLIGRAM(S): 2.5 TABLET ORAL at 13:55

## 2024-01-01 RX ADMIN — BUMETANIDE 2.5 MG/HR: 0.25 INJECTION INTRAMUSCULAR; INTRAVENOUS at 05:14

## 2024-01-01 RX ADMIN — Medication 3: at 22:23

## 2024-01-01 RX ADMIN — HEPARIN SODIUM 5000 UNIT(S): 5000 INJECTION INTRAVENOUS; SUBCUTANEOUS at 06:09

## 2024-01-01 RX ADMIN — HEPARIN SODIUM 5000 UNIT(S): 5000 INJECTION INTRAVENOUS; SUBCUTANEOUS at 13:04

## 2024-01-01 RX ADMIN — HEPARIN SODIUM 5000 UNIT(S): 5000 INJECTION INTRAVENOUS; SUBCUTANEOUS at 13:05

## 2024-01-01 RX ADMIN — Medication 325 MILLIGRAM(S): at 17:01

## 2024-01-01 RX ADMIN — Medication 200 MILLIGRAM(S): at 14:32

## 2024-01-01 RX ADMIN — Medication 250 MILLIGRAM(S): at 06:08

## 2024-01-01 RX ADMIN — Medication 3: at 09:00

## 2024-01-01 RX ADMIN — Medication 2: at 14:03

## 2024-01-01 RX ADMIN — MIDODRINE HYDROCHLORIDE 15 MILLIGRAM(S): 2.5 TABLET ORAL at 17:54

## 2024-01-01 RX ADMIN — APIXABAN 5 MILLIGRAM(S): 2.5 TABLET, FILM COATED ORAL at 18:06

## 2024-01-01 RX ADMIN — Medication 50 MILLIGRAM(S): at 17:19

## 2024-01-01 RX ADMIN — FLUDROCORTISONE ACETATE 0.1 MILLIGRAM(S): 0.1 TABLET ORAL at 05:09

## 2024-01-01 RX ADMIN — Medication 4 UNIT(S): at 09:00

## 2024-01-01 RX ADMIN — DEXMEDETOMIDINE HYDROCHLORIDE 17.2 MICROGRAM(S)/KG/HR: 4 INJECTION, SOLUTION INTRAVENOUS at 15:41

## 2024-01-01 RX ADMIN — SENNA PLUS 2 TABLET(S): 8.6 TABLET ORAL at 22:21

## 2024-01-01 RX ADMIN — PANTOPRAZOLE SODIUM 40 MILLIGRAM(S): 20 TABLET, DELAYED RELEASE ORAL at 17:38

## 2024-01-01 RX ADMIN — Medication 75 MICROGRAM(S): at 07:02

## 2024-01-01 RX ADMIN — Medication 0.1 MILLIGRAM(S): at 06:16

## 2024-01-01 RX ADMIN — INSULIN GLARGINE 10 UNIT(S): 100 INJECTION, SOLUTION SUBCUTANEOUS at 22:27

## 2024-01-01 RX ADMIN — ATORVASTATIN CALCIUM 80 MILLIGRAM(S): 80 TABLET, FILM COATED ORAL at 21:09

## 2024-01-01 RX ADMIN — Medication 56 MICROGRAM(S): at 22:39

## 2024-01-01 RX ADMIN — Medication 1: at 02:40

## 2024-01-01 RX ADMIN — INSULIN GLARGINE 10 UNIT(S): 100 INJECTION, SOLUTION SUBCUTANEOUS at 11:28

## 2024-01-01 RX ADMIN — DROXIDOPA 600 MILLIGRAM(S): 100 CAPSULE ORAL at 13:41

## 2024-01-01 RX ADMIN — NOREPINEPHRINE BITARTRATE 42.5 MICROGRAM(S)/KG/MIN: 1 INJECTION INTRAVENOUS at 19:22

## 2024-01-01 RX ADMIN — Medication 2: at 12:10

## 2024-01-01 RX ADMIN — Medication 1 TABLET(S): at 12:58

## 2024-01-01 RX ADMIN — Medication 500000 UNIT(S): at 23:15

## 2024-01-01 RX ADMIN — Medication 1 APPLICATION(S): at 05:23

## 2024-01-01 RX ADMIN — Medication 1: at 09:21

## 2024-01-01 RX ADMIN — ATORVASTATIN CALCIUM 80 MILLIGRAM(S): 80 TABLET, FILM COATED ORAL at 21:46

## 2024-01-01 RX ADMIN — Medication 1 APPLICATION(S): at 06:09

## 2024-01-01 RX ADMIN — PROPOFOL 24.7 MICROGRAM(S)/KG/MIN: 10 INJECTION, EMULSION INTRAVENOUS at 23:12

## 2024-01-01 RX ADMIN — ATORVASTATIN CALCIUM 80 MILLIGRAM(S): 80 TABLET, FILM COATED ORAL at 22:23

## 2024-01-01 RX ADMIN — Medication 3: at 13:54

## 2024-01-01 RX ADMIN — INSULIN GLARGINE 20 UNIT(S): 100 INJECTION, SOLUTION SUBCUTANEOUS at 22:12

## 2024-01-01 RX ADMIN — Medication 50 MILLIGRAM(S): at 00:12

## 2024-01-01 RX ADMIN — APIXABAN 5 MILLIGRAM(S): 2.5 TABLET, FILM COATED ORAL at 17:44

## 2024-01-01 RX ADMIN — ATORVASTATIN CALCIUM 80 MILLIGRAM(S): 80 TABLET, FILM COATED ORAL at 21:03

## 2024-01-01 RX ADMIN — Medication 40 MILLIGRAM(S): at 06:47

## 2024-01-01 RX ADMIN — HEPARIN SODIUM 5000 UNIT(S): 5000 INJECTION INTRAVENOUS; SUBCUTANEOUS at 21:59

## 2024-01-01 RX ADMIN — MIDODRINE HYDROCHLORIDE 30 MILLIGRAM(S): 2.5 TABLET ORAL at 08:44

## 2024-01-01 RX ADMIN — Medication 81 MILLIGRAM(S): at 12:54

## 2024-01-01 RX ADMIN — Medication 400 MILLIGRAM(S): at 22:58

## 2024-01-01 RX ADMIN — Medication 80 MILLIGRAM(S): at 05:09

## 2024-01-01 RX ADMIN — Medication 8 UNIT(S): at 17:20

## 2024-01-01 RX ADMIN — Medication 8 UNIT(S): at 17:06

## 2024-01-01 RX ADMIN — MIDODRINE HYDROCHLORIDE 15 MILLIGRAM(S): 2.5 TABLET ORAL at 17:22

## 2024-01-01 RX ADMIN — MIDODRINE HYDROCHLORIDE 30 MILLIGRAM(S): 2.5 TABLET ORAL at 05:40

## 2024-01-01 RX ADMIN — Medication 325 MILLIGRAM(S): at 18:11

## 2024-01-01 RX ADMIN — HEPARIN SODIUM 5000 UNIT(S): 5000 INJECTION INTRAVENOUS; SUBCUTANEOUS at 05:45

## 2024-01-01 RX ADMIN — Medication 325 MILLIGRAM(S): at 17:48

## 2024-01-01 RX ADMIN — APIXABAN 5 MILLIGRAM(S): 2.5 TABLET, FILM COATED ORAL at 17:59

## 2024-01-01 RX ADMIN — DROXIDOPA 600 MILLIGRAM(S): 100 CAPSULE ORAL at 06:06

## 2024-01-01 RX ADMIN — VASOPRESSIN 3 UNIT(S)/MIN: 20 INJECTION INTRAVENOUS at 07:33

## 2024-01-01 RX ADMIN — CALAMINE AND ZINC OXIDE AND PHENOL 1 APPLICATION(S): 160; 10 LOTION TOPICAL at 16:10

## 2024-01-01 RX ADMIN — FLUDROCORTISONE ACETATE 0.1 MILLIGRAM(S): 0.1 TABLET ORAL at 06:47

## 2024-01-01 RX ADMIN — PANTOPRAZOLE SODIUM 40 MILLIGRAM(S): 20 TABLET, DELAYED RELEASE ORAL at 12:17

## 2024-01-01 RX ADMIN — Medication 25 GRAM(S): at 23:30

## 2024-01-01 RX ADMIN — Medication 14 MICROGRAM(S)/KG/MIN: at 20:07

## 2024-01-01 RX ADMIN — APIXABAN 5 MILLIGRAM(S): 2.5 TABLET, FILM COATED ORAL at 17:21

## 2024-01-01 RX ADMIN — HEPARIN SODIUM 5000 UNIT(S): 5000 INJECTION INTRAVENOUS; SUBCUTANEOUS at 05:13

## 2024-01-01 RX ADMIN — PANTOPRAZOLE SODIUM 40 MILLIGRAM(S): 20 TABLET, DELAYED RELEASE ORAL at 05:49

## 2024-01-01 RX ADMIN — Medication 1 PACKET(S): at 08:44

## 2024-01-01 RX ADMIN — Medication 325 MILLIGRAM(S): at 05:13

## 2024-01-01 RX ADMIN — INSULIN LISPRO 8: 100 INJECTION, SOLUTION SUBCUTANEOUS at 17:24

## 2024-01-01 RX ADMIN — ATORVASTATIN CALCIUM 80 MILLIGRAM(S): 80 TABLET, FILM COATED ORAL at 22:13

## 2024-01-01 RX ADMIN — HEPARIN SODIUM 5000 UNIT(S): 5000 INJECTION INTRAVENOUS; SUBCUTANEOUS at 06:19

## 2024-01-01 RX ADMIN — Medication 1 TABLET(S): at 14:53

## 2024-01-01 RX ADMIN — Medication 1 TABLET(S): at 11:57

## 2024-01-01 RX ADMIN — MIDODRINE HYDROCHLORIDE 15 MILLIGRAM(S): 2.5 TABLET ORAL at 05:41

## 2024-01-01 RX ADMIN — MIDODRINE HYDROCHLORIDE 30 MILLIGRAM(S): 2.5 TABLET ORAL at 06:06

## 2024-01-01 RX ADMIN — Medication 81 MILLIGRAM(S): at 13:19

## 2024-01-01 RX ADMIN — Medication 500000 UNIT(S): at 05:10

## 2024-01-01 RX ADMIN — HEPARIN SODIUM 5000 UNIT(S): 5000 INJECTION INTRAVENOUS; SUBCUTANEOUS at 17:28

## 2024-01-01 RX ADMIN — Medication 10 UNIT(S): at 14:00

## 2024-01-01 RX ADMIN — Medication 500 MILLIGRAM(S): at 12:48

## 2024-01-01 RX ADMIN — MUPIROCIN 1 APPLICATION(S): 20 OINTMENT TOPICAL at 06:19

## 2024-01-01 RX ADMIN — Medication 1 APPLICATION(S): at 06:55

## 2024-01-01 RX ADMIN — INSULIN GLARGINE 20 UNIT(S): 100 INJECTION, SOLUTION SUBCUTANEOUS at 21:51

## 2024-01-01 RX ADMIN — Medication 325 MILLIGRAM(S): at 05:00

## 2024-01-01 RX ADMIN — Medication 75 MICROGRAM(S): at 06:36

## 2024-01-01 RX ADMIN — Medication 1 APPLICATION(S): at 22:46

## 2024-01-01 RX ADMIN — ATORVASTATIN CALCIUM 80 MILLIGRAM(S): 80 TABLET, FILM COATED ORAL at 21:41

## 2024-01-01 RX ADMIN — MUPIROCIN 1 APPLICATION(S): 20 OINTMENT TOPICAL at 06:43

## 2024-01-01 RX ADMIN — Medication 325 MILLIGRAM(S): at 17:41

## 2024-01-01 RX ADMIN — Medication 20 MILLIGRAM(S): at 18:15

## 2024-01-01 RX ADMIN — HEPARIN SODIUM 5000 UNIT(S): 5000 INJECTION INTRAVENOUS; SUBCUTANEOUS at 21:29

## 2024-01-01 RX ADMIN — Medication 1 TABLET(S): at 11:35

## 2024-01-01 RX ADMIN — Medication 1 TABLET(S): at 12:29

## 2024-01-01 RX ADMIN — FLUDROCORTISONE ACETATE 0.1 MILLIGRAM(S): 0.1 TABLET ORAL at 06:13

## 2024-01-01 RX ADMIN — Medication 1 TABLET(S): at 17:39

## 2024-01-01 RX ADMIN — APIXABAN 5 MILLIGRAM(S): 5 TABLET, FILM COATED ORAL at 06:40

## 2024-01-01 RX ADMIN — Medication 5 UNIT(S): at 17:45

## 2024-01-01 RX ADMIN — NYSTATIN CREAM 1 APPLICATION(S): 100000 CREAM TOPICAL at 05:46

## 2024-01-01 RX ADMIN — FENTANYL CITRATE 50 MICROGRAM(S): 50 INJECTION, SOLUTION INTRAMUSCULAR; INTRAVENOUS at 05:12

## 2024-01-01 RX ADMIN — NYSTATIN CREAM 1 APPLICATION(S): 100000 CREAM TOPICAL at 13:55

## 2024-01-01 RX ADMIN — Medication 1 APPLICATION(S): at 00:51

## 2024-01-01 RX ADMIN — Medication 325 MILLIGRAM(S): at 17:44

## 2024-01-01 RX ADMIN — MIDODRINE HYDROCHLORIDE 10 MILLIGRAM(S): 2.5 TABLET ORAL at 21:19

## 2024-01-01 RX ADMIN — Medication 75 MICROGRAM(S): at 06:07

## 2024-01-01 RX ADMIN — APIXABAN 5 MILLIGRAM(S): 2.5 TABLET, FILM COATED ORAL at 06:38

## 2024-01-01 RX ADMIN — Medication 2: at 13:45

## 2024-01-01 RX ADMIN — SODIUM CHLORIDE 75 MILLILITER(S): 9 INJECTION, SOLUTION INTRAVENOUS at 19:11

## 2024-01-01 RX ADMIN — CHLORHEXIDINE GLUCONATE 1 APPLICATION(S): 213 SOLUTION TOPICAL at 09:31

## 2024-01-01 RX ADMIN — Medication 8 UNIT(S): at 13:16

## 2024-01-01 RX ADMIN — VASOPRESSIN 9 UNIT(S)/MIN: 20 INJECTION INTRAVENOUS at 07:43

## 2024-01-01 RX ADMIN — Medication 75 MICROGRAM(S): at 06:34

## 2024-01-01 RX ADMIN — Medication 1 APPLICATION(S): at 18:31

## 2024-01-01 RX ADMIN — Medication 1 APPLICATION(S): at 05:12

## 2024-01-01 RX ADMIN — INSULIN GLARGINE 12 UNIT(S): 100 INJECTION, SOLUTION SUBCUTANEOUS at 21:20

## 2024-01-01 RX ADMIN — CALAMINE AND ZINC OXIDE AND PHENOL 1 APPLICATION(S): 160; 10 LOTION TOPICAL at 13:10

## 2024-01-01 RX ADMIN — Medication 10 UNIT(S): at 09:18

## 2024-01-01 RX ADMIN — Medication 81 MILLIGRAM(S): at 11:23

## 2024-01-01 RX ADMIN — Medication 325 MILLIGRAM(S): at 17:20

## 2024-01-01 RX ADMIN — MIDODRINE HYDROCHLORIDE 20 MILLIGRAM(S): 2.5 TABLET ORAL at 05:30

## 2024-01-01 RX ADMIN — MIDODRINE HYDROCHLORIDE 15 MILLIGRAM(S): 2.5 TABLET ORAL at 13:17

## 2024-01-01 RX ADMIN — Medication 1 PACKET(S): at 13:36

## 2024-01-01 RX ADMIN — Medication 1 PACKET(S): at 21:14

## 2024-01-01 RX ADMIN — HEPARIN SODIUM 5000 UNIT(S): 5000 INJECTION INTRAVENOUS; SUBCUTANEOUS at 06:54

## 2024-01-01 RX ADMIN — APIXABAN 5 MILLIGRAM(S): 2.5 TABLET, FILM COATED ORAL at 06:47

## 2024-01-01 RX ADMIN — VASOPRESSIN 3 UNIT(S)/MIN: 20 INJECTION INTRAVENOUS at 08:38

## 2024-01-01 RX ADMIN — HEPARIN SODIUM 5000 UNIT(S): 5000 INJECTION INTRAVENOUS; SUBCUTANEOUS at 05:39

## 2024-01-01 RX ADMIN — Medication 81 MILLIGRAM(S): at 12:50

## 2024-01-01 RX ADMIN — APIXABAN 5 MILLIGRAM(S): 2.5 TABLET, FILM COATED ORAL at 06:25

## 2024-01-01 RX ADMIN — CALAMINE AND ZINC OXIDE AND PHENOL 1 APPLICATION(S): 160; 10 LOTION TOPICAL at 13:55

## 2024-01-01 RX ADMIN — Medication 4 UNIT(S): at 09:01

## 2024-01-01 RX ADMIN — Medication 81 MILLIGRAM(S): at 14:53

## 2024-01-01 RX ADMIN — DROXIDOPA 400 MILLIGRAM(S): 100 CAPSULE ORAL at 05:11

## 2024-01-01 RX ADMIN — Medication 1 TABLET(S): at 14:14

## 2024-01-01 RX ADMIN — FLUDROCORTISONE ACETATE 0.1 MILLIGRAM(S): 0.1 TABLET ORAL at 06:19

## 2024-01-01 RX ADMIN — NYSTATIN CREAM 1 APPLICATION(S): 100000 CREAM TOPICAL at 17:07

## 2024-01-01 RX ADMIN — MUPIROCIN 1 APPLICATION(S): 20 OINTMENT TOPICAL at 18:28

## 2024-01-01 RX ADMIN — Medication 3 UNIT(S): at 17:43

## 2024-01-01 RX ADMIN — INSULIN GLARGINE 20 UNIT(S): 100 INJECTION, SOLUTION SUBCUTANEOUS at 22:01

## 2024-01-01 RX ADMIN — ASPIRIN 81 MILLIGRAM(S): 325 TABLET, FILM COATED ORAL at 12:12

## 2024-01-01 RX ADMIN — Medication 81 MILLIGRAM(S): at 12:21

## 2024-01-01 RX ADMIN — Medication 1: at 13:14

## 2024-01-01 RX ADMIN — INSULIN GLARGINE 18 UNIT(S): 100 INJECTION, SOLUTION SUBCUTANEOUS at 22:16

## 2024-01-01 RX ADMIN — VASOPRESSIN 3 UNIT(S)/MIN: 20 INJECTION INTRAVENOUS at 21:45

## 2024-01-01 RX ADMIN — Medication 81 MILLIGRAM(S): at 11:57

## 2024-01-01 RX ADMIN — MIDODRINE HYDROCHLORIDE 20 MILLIGRAM(S): 10 TABLET ORAL at 17:01

## 2024-01-01 RX ADMIN — Medication 60 MILLIGRAM(S): at 14:22

## 2024-01-01 RX ADMIN — Medication 7 UNIT(S): at 12:52

## 2024-01-01 RX ADMIN — DROXIDOPA 100 MILLIGRAM(S): 100 CAPSULE ORAL at 15:00

## 2024-01-01 RX ADMIN — FLUDROCORTISONE ACETATE 0.2 MILLIGRAM(S): 0.1 TABLET ORAL at 06:38

## 2024-01-01 RX ADMIN — Medication 75 MICROGRAM(S): at 05:22

## 2024-01-01 RX ADMIN — HEPARIN SODIUM 5000 UNIT(S): 5000 INJECTION INTRAVENOUS; SUBCUTANEOUS at 21:03

## 2024-01-01 RX ADMIN — CHLORHEXIDINE GLUCONATE 1 APPLICATION(S): 213 SOLUTION TOPICAL at 22:16

## 2024-01-01 RX ADMIN — Medication 50 MILLIGRAM(S): at 13:52

## 2024-01-01 RX ADMIN — MIDODRINE HYDROCHLORIDE 30 MILLIGRAM(S): 10 TABLET ORAL at 11:23

## 2024-01-01 RX ADMIN — PYRIDOSTIGMINE BROMIDE 30 MILLIGRAM(S): 60 SOLUTION ORAL at 18:06

## 2024-01-01 RX ADMIN — SENNA PLUS 2 TABLET(S): 8.6 TABLET ORAL at 21:42

## 2024-01-01 RX ADMIN — DROXIDOPA 200 MILLIGRAM(S): 100 CAPSULE ORAL at 06:36

## 2024-01-01 RX ADMIN — DEXMEDETOMIDINE HYDROCHLORIDE 17.2 MICROGRAM(S)/KG/HR: 4 INJECTION, SOLUTION INTRAVENOUS at 12:00

## 2024-01-01 RX ADMIN — PANTOPRAZOLE SODIUM 40 MILLIGRAM(S): 20 TABLET, DELAYED RELEASE ORAL at 07:06

## 2024-01-01 RX ADMIN — PYRIDOSTIGMINE BROMIDE 60 MILLIGRAM(S): 60 TABLET ORAL at 06:40

## 2024-01-01 RX ADMIN — Medication 2: at 09:20

## 2024-01-01 RX ADMIN — CEFTRIAXONE 100 MILLIGRAM(S): 500 INJECTION, POWDER, FOR SOLUTION INTRAMUSCULAR; INTRAVENOUS at 17:43

## 2024-01-01 RX ADMIN — Medication 75 MICROGRAM(S): at 05:38

## 2024-01-01 RX ADMIN — SODIUM CHLORIDE 100 MILLILITER(S): 9 INJECTION, SOLUTION INTRAVENOUS at 13:38

## 2024-01-01 RX ADMIN — MIDODRINE HYDROCHLORIDE 30 MILLIGRAM(S): 2.5 TABLET ORAL at 13:41

## 2024-01-01 RX ADMIN — Medication 1 APPLICATION(S): at 17:38

## 2024-01-01 RX ADMIN — Medication 5 UNIT(S): at 13:26

## 2024-01-01 RX ADMIN — ATORVASTATIN CALCIUM 80 MILLIGRAM(S): 80 TABLET, FILM COATED ORAL at 21:40

## 2024-01-01 RX ADMIN — PANTOPRAZOLE SODIUM 40 MILLIGRAM(S): 20 TABLET, DELAYED RELEASE ORAL at 06:43

## 2024-01-01 RX ADMIN — CHLORHEXIDINE GLUCONATE 1 APPLICATION(S): 213 SOLUTION TOPICAL at 06:21

## 2024-01-01 RX ADMIN — Medication 500000 UNIT(S): at 07:33

## 2024-01-01 RX ADMIN — INSULIN GLARGINE 22 UNIT(S): 100 INJECTION, SOLUTION SUBCUTANEOUS at 22:22

## 2024-01-01 RX ADMIN — Medication 1 TABLET(S): at 12:09

## 2024-01-01 RX ADMIN — Medication 1 APPLICATION(S): at 17:25

## 2024-01-01 RX ADMIN — Medication 4 UNIT(S): at 17:21

## 2024-01-01 RX ADMIN — APIXABAN 5 MILLIGRAM(S): 2.5 TABLET, FILM COATED ORAL at 17:57

## 2024-01-01 RX ADMIN — Medication 75 MICROGRAM(S): at 05:31

## 2024-01-01 RX ADMIN — ATORVASTATIN CALCIUM 80 MILLIGRAM(S): 80 TABLET, FILM COATED ORAL at 22:26

## 2024-01-01 RX ADMIN — Medication 81 MILLIGRAM(S): at 13:12

## 2024-01-01 RX ADMIN — DEXMEDETOMIDINE HYDROCHLORIDE 17.2 MICROGRAM(S)/KG/HR: 4 INJECTION, SOLUTION INTRAVENOUS at 17:17

## 2024-01-01 RX ADMIN — Medication 1: at 18:37

## 2024-01-01 RX ADMIN — MIDODRINE HYDROCHLORIDE 30 MILLIGRAM(S): 10 TABLET ORAL at 05:21

## 2024-01-01 RX ADMIN — Medication 81 MILLIGRAM(S): at 11:46

## 2024-01-01 RX ADMIN — HEPARIN SODIUM 5000 UNIT(S): 5000 INJECTION INTRAVENOUS; SUBCUTANEOUS at 22:29

## 2024-01-01 RX ADMIN — SODIUM CHLORIDE 100 MILLILITER(S): 9 INJECTION, SOLUTION INTRAVENOUS at 15:00

## 2024-01-01 RX ADMIN — DEXMEDETOMIDINE HYDROCHLORIDE 17.2 MICROGRAM(S)/KG/HR: 4 INJECTION, SOLUTION INTRAVENOUS at 00:50

## 2024-01-01 RX ADMIN — Medication 5 UNIT(S): at 11:48

## 2024-01-01 RX ADMIN — HEPARIN SODIUM 5000 UNIT(S): 5000 INJECTION INTRAVENOUS; SUBCUTANEOUS at 05:27

## 2024-01-01 RX ADMIN — FENTANYL CITRATE 25 MICROGRAM(S): 50 INJECTION, SOLUTION INTRAMUSCULAR; INTRAVENOUS at 18:31

## 2024-01-01 RX ADMIN — Medication 1 APPLICATION(S): at 12:56

## 2024-01-01 RX ADMIN — APIXABAN 5 MILLIGRAM(S): 2.5 TABLET, FILM COATED ORAL at 05:13

## 2024-01-01 RX ADMIN — PANTOPRAZOLE SODIUM 40 MILLIGRAM(S): 20 TABLET, DELAYED RELEASE ORAL at 17:44

## 2024-01-01 RX ADMIN — Medication 500 MILLIGRAM(S): at 12:25

## 2024-01-01 RX ADMIN — POLYETHYLENE GLYCOL 3350 17 GRAM(S): 17 POWDER, FOR SOLUTION ORAL at 12:08

## 2024-01-01 RX ADMIN — Medication 15 MILLILITER(S): at 05:21

## 2024-01-01 RX ADMIN — Medication 325 MILLIGRAM(S): at 20:59

## 2024-01-01 RX ADMIN — Medication 7 UNIT(S): at 18:00

## 2024-01-01 RX ADMIN — Medication 40 MILLIEQUIVALENT(S): at 12:05

## 2024-01-01 RX ADMIN — PANTOPRAZOLE SODIUM 40 MILLIGRAM(S): 20 TABLET, DELAYED RELEASE ORAL at 17:07

## 2024-01-01 RX ADMIN — Medication 4 UNIT(S): at 09:12

## 2024-01-01 RX ADMIN — MIDODRINE HYDROCHLORIDE 15 MILLIGRAM(S): 2.5 TABLET ORAL at 12:49

## 2024-01-01 RX ADMIN — APIXABAN 5 MILLIGRAM(S): 2.5 TABLET, FILM COATED ORAL at 05:58

## 2024-01-01 RX ADMIN — Medication 1: at 17:14

## 2024-01-01 RX ADMIN — CHLORHEXIDINE GLUCONATE 1 APPLICATION(S): 213 SOLUTION TOPICAL at 22:27

## 2024-01-01 RX ADMIN — DROXIDOPA 600 MILLIGRAM(S): 100 CAPSULE ORAL at 06:03

## 2024-01-01 RX ADMIN — Medication 500 MILLIGRAM(S): at 12:29

## 2024-01-01 RX ADMIN — Medication 500 MILLIGRAM(S): at 12:41

## 2024-01-01 RX ADMIN — VASOPRESSIN 3 UNIT(S)/MIN: 20 INJECTION INTRAVENOUS at 21:23

## 2024-01-01 RX ADMIN — MIDODRINE HYDROCHLORIDE 15 MILLIGRAM(S): 2.5 TABLET ORAL at 05:25

## 2024-01-01 RX ADMIN — INSULIN GLARGINE 24 UNIT(S): 100 INJECTION, SOLUTION SUBCUTANEOUS at 21:16

## 2024-01-01 RX ADMIN — HEPARIN SODIUM 5000 UNIT(S): 5000 INJECTION INTRAVENOUS; SUBCUTANEOUS at 22:44

## 2024-01-01 RX ADMIN — MIDODRINE HYDROCHLORIDE 30 MILLIGRAM(S): 2.5 TABLET ORAL at 06:19

## 2024-01-01 RX ADMIN — MUPIROCIN 1 APPLICATION(S): 20 OINTMENT TOPICAL at 05:30

## 2024-01-01 RX ADMIN — Medication 1 TABLET(S): at 12:33

## 2024-01-01 RX ADMIN — MUPIROCIN 1 APPLICATION(S): 20 OINTMENT TOPICAL at 22:17

## 2024-01-01 RX ADMIN — Medication 1 APPLICATION(S): at 15:06

## 2024-01-01 RX ADMIN — Medication 1 APPLICATION(S): at 05:11

## 2024-01-01 RX ADMIN — Medication 50 MILLILITER(S): at 21:26

## 2024-01-01 RX ADMIN — CEFTRIAXONE 100 MILLIGRAM(S): 500 INJECTION, POWDER, FOR SOLUTION INTRAMUSCULAR; INTRAVENOUS at 17:01

## 2024-01-01 RX ADMIN — SODIUM CHLORIDE 100 MILLILITER(S): 9 INJECTION, SOLUTION INTRAVENOUS at 17:42

## 2024-01-01 RX ADMIN — Medication 166.67 MILLILITER(S): at 09:40

## 2024-01-01 RX ADMIN — Medication 1: at 21:25

## 2024-01-01 RX ADMIN — Medication 500000 UNIT(S): at 13:20

## 2024-01-01 RX ADMIN — CALAMINE AND ZINC OXIDE AND PHENOL 1 APPLICATION(S): 160; 10 LOTION TOPICAL at 06:52

## 2024-01-01 RX ADMIN — Medication 325 MILLIGRAM(S): at 19:08

## 2024-01-01 RX ADMIN — Medication 75 MICROGRAM(S): at 05:17

## 2024-01-01 RX ADMIN — PYRIDOSTIGMINE BROMIDE 60 MILLIGRAM(S): 60 SOLUTION ORAL at 09:29

## 2024-01-01 RX ADMIN — Medication 2: at 21:46

## 2024-01-01 RX ADMIN — Medication 15 MILLILITER(S): at 17:21

## 2024-01-01 RX ADMIN — NYSTATIN CREAM 1 APPLICATION(S): 100000 CREAM TOPICAL at 06:38

## 2024-01-01 RX ADMIN — MIDODRINE HYDROCHLORIDE 15 MILLIGRAM(S): 2.5 TABLET ORAL at 17:59

## 2024-01-01 RX ADMIN — Medication 7 UNIT(S): at 12:05

## 2024-01-01 RX ADMIN — APIXABAN 5 MILLIGRAM(S): 5 TABLET, FILM COATED ORAL at 08:12

## 2024-01-01 RX ADMIN — Medication 4 UNIT(S): at 17:37

## 2024-01-01 RX ADMIN — APIXABAN 10 MILLIGRAM(S): 2.5 TABLET, FILM COATED ORAL at 21:31

## 2024-01-01 RX ADMIN — Medication 325 MILLIGRAM(S): at 17:04

## 2024-01-01 RX ADMIN — ATORVASTATIN CALCIUM 80 MILLIGRAM(S): 80 TABLET, FILM COATED ORAL at 21:29

## 2024-01-01 RX ADMIN — Medication 15 MILLILITER(S): at 17:25

## 2024-01-01 RX ADMIN — Medication 1 APPLICATION(S): at 22:54

## 2024-01-01 RX ADMIN — Medication 500000 UNIT(S): at 12:21

## 2024-01-01 RX ADMIN — INSULIN GLARGINE 10 UNIT(S): 100 INJECTION, SOLUTION SUBCUTANEOUS at 22:37

## 2024-01-01 RX ADMIN — Medication 30 MILLILITER(S): at 01:30

## 2024-01-01 RX ADMIN — APIXABAN 5 MILLIGRAM(S): 2.5 TABLET, FILM COATED ORAL at 05:57

## 2024-01-01 RX ADMIN — Medication 1 TABLET(S): at 13:40

## 2024-01-01 RX ADMIN — Medication 10 MILLIGRAM(S): at 13:06

## 2024-01-01 RX ADMIN — Medication 3 UNIT(S): at 13:20

## 2024-01-01 RX ADMIN — ATORVASTATIN CALCIUM 80 MILLIGRAM(S): 80 TABLET, FILM COATED ORAL at 21:23

## 2024-01-01 RX ADMIN — DROXIDOPA 600 MILLIGRAM(S): 100 CAPSULE ORAL at 17:43

## 2024-01-01 RX ADMIN — APIXABAN 10 MILLIGRAM(S): 2.5 TABLET, FILM COATED ORAL at 09:19

## 2024-01-01 RX ADMIN — Medication 0.1 MILLIGRAM(S): at 12:39

## 2024-01-01 RX ADMIN — MIDODRINE HYDROCHLORIDE 30 MILLIGRAM(S): 2.5 TABLET ORAL at 05:38

## 2024-01-01 RX ADMIN — APIXABAN 5 MILLIGRAM(S): 2.5 TABLET, FILM COATED ORAL at 18:04

## 2024-01-01 RX ADMIN — INSULIN GLARGINE 18 UNIT(S): 100 INJECTION, SOLUTION SUBCUTANEOUS at 00:27

## 2024-01-01 RX ADMIN — Medication 81 MILLIGRAM(S): at 12:29

## 2024-01-01 RX ADMIN — Medication 81 MILLIGRAM(S): at 12:07

## 2024-01-01 RX ADMIN — Medication 3: at 16:53

## 2024-01-01 RX ADMIN — Medication 1: at 13:39

## 2024-01-01 RX ADMIN — PYRIDOSTIGMINE BROMIDE 60 MILLIGRAM(S): 60 SOLUTION ORAL at 09:32

## 2024-01-01 RX ADMIN — Medication 75 MICROGRAM(S): at 05:25

## 2024-01-01 RX ADMIN — INSULIN LISPRO 1: 100 INJECTION, SOLUTION SUBCUTANEOUS at 23:20

## 2024-01-01 RX ADMIN — Medication 1 APPLICATION(S): at 07:30

## 2024-01-01 RX ADMIN — Medication 325 MILLIGRAM(S): at 05:37

## 2024-01-01 RX ADMIN — Medication 2: at 18:03

## 2024-01-01 RX ADMIN — Medication 100 MILLIGRAM(S): at 05:37

## 2024-01-01 RX ADMIN — Medication 75 MICROGRAM(S): at 08:12

## 2024-01-01 RX ADMIN — Medication 1 APPLICATION(S): at 13:02

## 2024-01-01 RX ADMIN — Medication 50 MILLILITER(S): at 11:00

## 2024-01-01 RX ADMIN — PANTOPRAZOLE SODIUM 40 MILLIGRAM(S): 20 TABLET, DELAYED RELEASE ORAL at 05:37

## 2024-01-01 RX ADMIN — DROXIDOPA 200 MILLIGRAM(S): 100 CAPSULE ORAL at 12:52

## 2024-01-01 RX ADMIN — PYRIDOSTIGMINE BROMIDE 60 MILLIGRAM(S): 60 TABLET ORAL at 12:37

## 2024-01-01 RX ADMIN — Medication 1 APPLICATION(S): at 05:09

## 2024-01-01 RX ADMIN — SODIUM ZIRCONIUM CYCLOSILICATE 10 GRAM(S): 10 POWDER, FOR SUSPENSION ORAL at 11:01

## 2024-01-01 RX ADMIN — Medication 1 APPLICATION(S): at 06:40

## 2024-01-01 RX ADMIN — PANTOPRAZOLE SODIUM 40 MILLIGRAM(S): 40 INJECTION, POWDER, FOR SOLUTION INTRAVENOUS at 05:18

## 2024-01-01 RX ADMIN — ERYTHROPOIETIN 10000 UNIT(S): 10000 INJECTION, SOLUTION INTRAVENOUS; SUBCUTANEOUS at 15:14

## 2024-01-01 RX ADMIN — FENTANYL CITRATE 50 MICROGRAM(S): 50 INJECTION, SOLUTION INTRAMUSCULAR; INTRAVENOUS at 08:07

## 2024-01-01 RX ADMIN — Medication 325 MILLIGRAM(S): at 18:14

## 2024-01-01 RX ADMIN — ATORVASTATIN CALCIUM 80 MILLIGRAM(S): 80 TABLET, FILM COATED ORAL at 21:16

## 2024-01-01 RX ADMIN — BUMETANIDE 132 MILLIGRAM(S): 0.25 INJECTION INTRAMUSCULAR; INTRAVENOUS at 12:50

## 2024-01-01 RX ADMIN — Medication 81 MILLIGRAM(S): at 13:06

## 2024-01-01 RX ADMIN — INSULIN GLARGINE 12 UNIT(S): 100 INJECTION, SOLUTION SUBCUTANEOUS at 22:38

## 2024-01-01 RX ADMIN — MIDODRINE HYDROCHLORIDE 30 MILLIGRAM(S): 2.5 TABLET ORAL at 09:28

## 2024-01-01 RX ADMIN — Medication 1: at 17:49

## 2024-01-01 RX ADMIN — SODIUM CHLORIDE 75 MILLILITER(S): 9 INJECTION INTRAMUSCULAR; INTRAVENOUS; SUBCUTANEOUS at 14:59

## 2024-01-01 RX ADMIN — Medication 81 MILLIGRAM(S): at 12:17

## 2024-01-01 RX ADMIN — CALAMINE AND ZINC OXIDE AND PHENOL 1 APPLICATION(S): 160; 10 LOTION TOPICAL at 12:46

## 2024-01-01 RX ADMIN — DEXMEDETOMIDINE HYDROCHLORIDE 17.2 MICROGRAM(S)/KG/HR: 4 INJECTION, SOLUTION INTRAVENOUS at 09:32

## 2024-01-01 RX ADMIN — Medication 2: at 10:03

## 2024-01-01 RX ADMIN — Medication 8 UNIT(S): at 18:04

## 2024-01-01 RX ADMIN — DEXMEDETOMIDINE HYDROCHLORIDE 17.2 MICROGRAM(S)/KG/HR: 4 INJECTION, SOLUTION INTRAVENOUS at 05:46

## 2024-01-01 RX ADMIN — Medication 500000 UNIT(S): at 23:22

## 2024-01-01 RX ADMIN — Medication 1: at 08:53

## 2024-01-01 RX ADMIN — Medication 81 MILLIGRAM(S): at 12:49

## 2024-01-01 RX ADMIN — Medication 1 APPLICATION(S): at 13:07

## 2024-01-01 RX ADMIN — APIXABAN 5 MILLIGRAM(S): 5 TABLET, FILM COATED ORAL at 17:23

## 2024-01-01 RX ADMIN — Medication 5 UNIT(S): at 08:35

## 2024-01-01 RX ADMIN — Medication 325 MILLIGRAM(S): at 21:43

## 2024-01-01 RX ADMIN — Medication 12.5 GRAM(S): at 08:52

## 2024-01-01 RX ADMIN — MIDODRINE HYDROCHLORIDE 15 MILLIGRAM(S): 2.5 TABLET ORAL at 13:28

## 2024-01-01 RX ADMIN — Medication 75 MICROGRAM(S): at 06:18

## 2024-01-01 RX ADMIN — Medication 4 UNIT(S): at 13:21

## 2024-01-01 RX ADMIN — Medication 325 MILLIGRAM(S): at 17:31

## 2024-01-01 RX ADMIN — Medication 325 MILLIGRAM(S): at 07:02

## 2024-01-01 RX ADMIN — MIDODRINE HYDROCHLORIDE 5 MILLIGRAM(S): 2.5 TABLET ORAL at 21:12

## 2024-01-01 RX ADMIN — HEPARIN SODIUM 5000 UNIT(S): 5000 INJECTION INTRAVENOUS; SUBCUTANEOUS at 06:13

## 2024-01-01 RX ADMIN — Medication 75 MICROGRAM(S): at 06:41

## 2024-01-01 RX ADMIN — Medication 1: at 12:40

## 2024-01-01 RX ADMIN — Medication 75 MICROGRAM(S): at 05:18

## 2024-01-01 RX ADMIN — DROXIDOPA 200 MILLIGRAM(S): 100 CAPSULE ORAL at 18:30

## 2024-01-01 RX ADMIN — Medication 1 PACKET(S): at 21:16

## 2024-01-01 RX ADMIN — Medication 500000 UNIT(S): at 23:37

## 2024-01-01 RX ADMIN — Medication 500 MILLIGRAM(S): at 13:05

## 2024-01-01 RX ADMIN — INSULIN GLARGINE 14 UNIT(S): 100 INJECTION, SOLUTION SUBCUTANEOUS at 22:29

## 2024-01-01 RX ADMIN — MIDODRINE HYDROCHLORIDE 20 MILLIGRAM(S): 2.5 TABLET ORAL at 05:57

## 2024-01-01 RX ADMIN — Medication 325 MILLIGRAM(S): at 17:42

## 2024-01-01 RX ADMIN — ERYTHROPOIETIN 10000 UNIT(S): 10000 INJECTION, SOLUTION INTRAVENOUS; SUBCUTANEOUS at 14:02

## 2024-01-01 RX ADMIN — Medication 20 MILLIGRAM(S): at 05:34

## 2024-01-01 RX ADMIN — DROXIDOPA 200 MILLIGRAM(S): 100 CAPSULE ORAL at 05:25

## 2024-01-01 RX ADMIN — Medication 4: at 13:20

## 2024-01-01 RX ADMIN — Medication 500000 UNIT(S): at 05:16

## 2024-01-01 RX ADMIN — APIXABAN 5 MILLIGRAM(S): 5 TABLET, FILM COATED ORAL at 05:18

## 2024-01-01 RX ADMIN — Medication 3 UNIT(S)/MIN: at 02:38

## 2024-01-01 RX ADMIN — CHLORHEXIDINE GLUCONATE 1 APPLICATION(S): 213 SOLUTION TOPICAL at 21:26

## 2024-01-01 RX ADMIN — FENTANYL CITRATE 25 MICROGRAM(S): 50 INJECTION, SOLUTION INTRAMUSCULAR; INTRAVENOUS at 11:00

## 2024-01-01 RX ADMIN — DEXMEDETOMIDINE HYDROCHLORIDE 17.2 MICROGRAM(S)/KG/HR: 4 INJECTION, SOLUTION INTRAVENOUS at 12:32

## 2024-01-01 RX ADMIN — CHLORHEXIDINE GLUCONATE 1 APPLICATION(S): 213 SOLUTION TOPICAL at 06:12

## 2024-01-01 RX ADMIN — CHLORHEXIDINE GLUCONATE 1 APPLICATION(S): 213 SOLUTION TOPICAL at 21:35

## 2024-01-01 RX ADMIN — DROXIDOPA 600 MILLIGRAM(S): 100 CAPSULE ORAL at 11:31

## 2024-01-01 RX ADMIN — SENNA PLUS 2 TABLET(S): 8.6 TABLET ORAL at 21:49

## 2024-01-01 RX ADMIN — Medication 50 MILLIGRAM(S): at 00:18

## 2024-01-01 RX ADMIN — Medication 500000 UNIT(S): at 00:17

## 2024-01-01 RX ADMIN — Medication 4: at 12:15

## 2024-01-01 RX ADMIN — CETIRIZINE HYDROCHLORIDE 10 MILLIGRAM(S): 10 TABLET ORAL at 11:45

## 2024-01-01 RX ADMIN — ATORVASTATIN CALCIUM 80 MILLIGRAM(S): 80 TABLET, FILM COATED ORAL at 21:44

## 2024-01-01 RX ADMIN — MIDODRINE HYDROCHLORIDE 20 MILLIGRAM(S): 2.5 TABLET ORAL at 17:39

## 2024-01-01 RX ADMIN — CALAMINE AND ZINC OXIDE AND PHENOL 1 APPLICATION(S): 160; 10 LOTION TOPICAL at 21:59

## 2024-01-01 RX ADMIN — DROXIDOPA 600 MILLIGRAM(S): 100 CAPSULE ORAL at 05:27

## 2024-01-01 RX ADMIN — PYRIDOSTIGMINE BROMIDE 30 MILLIGRAM(S): 60 SOLUTION ORAL at 06:18

## 2024-01-01 RX ADMIN — Medication 1: at 12:03

## 2024-01-01 RX ADMIN — MUPIROCIN 1 APPLICATION(S): 20 OINTMENT TOPICAL at 22:15

## 2024-01-01 RX ADMIN — Medication 325 MILLIGRAM(S): at 05:32

## 2024-01-01 RX ADMIN — Medication 4 UNIT(S): at 07:49

## 2024-01-01 RX ADMIN — PANTOPRAZOLE SODIUM 40 MILLIGRAM(S): 20 TABLET, DELAYED RELEASE ORAL at 06:47

## 2024-01-01 RX ADMIN — SODIUM CHLORIDE 84 MILLILITER(S): 9 INJECTION INTRAMUSCULAR; INTRAVENOUS; SUBCUTANEOUS at 13:15

## 2024-01-01 RX ADMIN — Medication 1 APPLICATION(S): at 17:32

## 2024-01-01 RX ADMIN — DROXIDOPA 200 MILLIGRAM(S): 100 CAPSULE ORAL at 12:17

## 2024-01-01 RX ADMIN — Medication 1 TABLET(S): at 11:46

## 2024-01-01 RX ADMIN — INSULIN LISPRO 3: 100 INJECTION, SOLUTION SUBCUTANEOUS at 12:40

## 2024-01-01 RX ADMIN — Medication 8 UNIT(S): at 18:10

## 2024-01-01 RX ADMIN — Medication 8 UNIT(S): at 13:25

## 2024-01-01 RX ADMIN — INSULIN GLARGINE 20 UNIT(S): 100 INJECTION, SOLUTION SUBCUTANEOUS at 08:08

## 2024-01-01 RX ADMIN — Medication 1 APPLICATION(S): at 22:00

## 2024-01-01 RX ADMIN — HEPARIN SODIUM 5000 UNIT(S): 5000 INJECTION INTRAVENOUS; SUBCUTANEOUS at 05:38

## 2024-01-01 RX ADMIN — Medication 1000 MILLILITER(S): at 19:06

## 2024-01-01 RX ADMIN — Medication 1: at 12:21

## 2024-01-01 RX ADMIN — MIDODRINE HYDROCHLORIDE 30 MILLIGRAM(S): 2.5 TABLET ORAL at 13:24

## 2024-01-01 RX ADMIN — FLUDROCORTISONE ACETATE 0.2 MILLIGRAM(S): 0.1 TABLET ORAL at 05:27

## 2024-01-01 RX ADMIN — Medication 1 APPLICATION(S): at 17:55

## 2024-01-01 RX ADMIN — Medication 1: at 18:02

## 2024-01-01 RX ADMIN — HEPARIN SODIUM 5000 UNIT(S): 5000 INJECTION INTRAVENOUS; SUBCUTANEOUS at 13:54

## 2024-01-01 RX ADMIN — MUPIROCIN 1 APPLICATION(S): 20 OINTMENT TOPICAL at 17:38

## 2024-01-01 RX ADMIN — Medication 1 TABLET(S): at 12:11

## 2024-01-01 RX ADMIN — Medication 1 APPLICATION(S): at 17:29

## 2024-01-01 RX ADMIN — Medication 75 MICROGRAM(S): at 05:06

## 2024-01-01 RX ADMIN — ATORVASTATIN CALCIUM 80 MILLIGRAM(S): 80 TABLET, FILM COATED ORAL at 21:39

## 2024-01-01 RX ADMIN — Medication 100 MILLIGRAM(S): at 06:22

## 2024-01-01 RX ADMIN — Medication 100 MILLIGRAM(S): at 11:47

## 2024-01-01 RX ADMIN — FLUDROCORTISONE ACETATE 0.2 MILLIGRAM(S): 0.1 TABLET ORAL at 05:25

## 2024-01-01 RX ADMIN — SODIUM CHLORIDE 70 MILLILITER(S): 9 INJECTION, SOLUTION INTRAVENOUS at 17:53

## 2024-01-01 RX ADMIN — PANTOPRAZOLE SODIUM 40 MILLIGRAM(S): 20 TABLET, DELAYED RELEASE ORAL at 13:23

## 2024-01-01 RX ADMIN — Medication 4 UNIT(S): at 13:44

## 2024-01-01 RX ADMIN — INSULIN GLARGINE 24 UNIT(S): 100 INJECTION, SOLUTION SUBCUTANEOUS at 21:51

## 2024-01-01 RX ADMIN — VASOPRESSIN 9 UNIT(S)/MIN: 20 INJECTION INTRAVENOUS at 19:25

## 2024-01-01 RX ADMIN — Medication 325 MILLIGRAM(S): at 18:03

## 2024-01-01 RX ADMIN — Medication 50 MILLILITER(S): at 09:08

## 2024-01-01 RX ADMIN — MIDODRINE HYDROCHLORIDE 15 MILLIGRAM(S): 2.5 TABLET ORAL at 12:56

## 2024-01-01 RX ADMIN — BUMETANIDE 10 MG/HR: 0.25 INJECTION INTRAMUSCULAR; INTRAVENOUS at 17:07

## 2024-01-01 RX ADMIN — PANTOPRAZOLE SODIUM 40 MILLIGRAM(S): 20 TABLET, DELAYED RELEASE ORAL at 17:59

## 2024-01-01 RX ADMIN — CHLORHEXIDINE GLUCONATE 1 APPLICATION(S): 213 SOLUTION TOPICAL at 05:52

## 2024-01-01 RX ADMIN — Medication 50 MILLILITER(S): at 17:39

## 2024-01-01 RX ADMIN — DROXIDOPA 600 MILLIGRAM(S): 100 CAPSULE ORAL at 05:36

## 2024-01-01 RX ADMIN — MUPIROCIN 1 APPLICATION(S): 20 OINTMENT TOPICAL at 13:30

## 2024-01-01 RX ADMIN — Medication 2: at 18:02

## 2024-01-01 RX ADMIN — Medication 1: at 09:11

## 2024-01-01 RX ADMIN — ERYTHROPOIETIN 10000 UNIT(S): 10000 INJECTION, SOLUTION INTRAVENOUS; SUBCUTANEOUS at 13:23

## 2024-01-01 RX ADMIN — Medication 500000 UNIT(S): at 12:40

## 2024-01-01 RX ADMIN — MUPIROCIN 1 APPLICATION(S): 20 OINTMENT TOPICAL at 13:47

## 2024-01-01 RX ADMIN — MIDODRINE HYDROCHLORIDE 15 MILLIGRAM(S): 2.5 TABLET ORAL at 21:47

## 2024-01-01 RX ADMIN — NOREPINEPHRINE BITARTRATE 42.5 MICROGRAM(S)/KG/MIN: 1 INJECTION INTRAVENOUS at 17:31

## 2024-01-01 RX ADMIN — Medication 81 MILLIGRAM(S): at 13:50

## 2024-01-01 RX ADMIN — Medication 50 MILLIGRAM(S): at 13:21

## 2024-01-01 RX ADMIN — Medication 1 APPLICATION(S): at 13:11

## 2024-01-01 RX ADMIN — CHLORHEXIDINE GLUCONATE 1 APPLICATION(S): 213 SOLUTION TOPICAL at 21:50

## 2024-01-01 RX ADMIN — PYRIDOSTIGMINE BROMIDE 30 MILLIGRAM(S): 60 SOLUTION ORAL at 14:06

## 2024-01-01 RX ADMIN — Medication 20 MILLIEQUIVALENT(S): at 06:16

## 2024-01-01 RX ADMIN — Medication 10 UNIT(S): at 18:10

## 2024-01-01 RX ADMIN — Medication 75 MICROGRAM(S): at 06:47

## 2024-01-01 RX ADMIN — Medication 100 MILLIGRAM(S): at 05:45

## 2024-01-01 RX ADMIN — Medication 5 UNIT(S): at 13:04

## 2024-01-01 RX ADMIN — ATORVASTATIN CALCIUM 80 MILLIGRAM(S): 80 TABLET, FILM COATED ORAL at 21:01

## 2024-01-01 RX ADMIN — ATORVASTATIN CALCIUM 80 MILLIGRAM(S): 80 TABLET, FILM COATED ORAL at 21:26

## 2024-01-01 RX ADMIN — ATORVASTATIN CALCIUM 80 MILLIGRAM(S): 80 TABLET, FILM COATED ORAL at 21:51

## 2024-01-01 RX ADMIN — Medication 1: at 17:23

## 2024-01-01 RX ADMIN — MIDODRINE HYDROCHLORIDE 30 MILLIGRAM(S): 2.5 TABLET ORAL at 12:56

## 2024-01-01 RX ADMIN — INSULIN GLARGINE 18 UNIT(S): 100 INJECTION, SOLUTION SUBCUTANEOUS at 21:16

## 2024-01-01 RX ADMIN — Medication 3: at 09:16

## 2024-01-01 RX ADMIN — APIXABAN 5 MILLIGRAM(S): 2.5 TABLET, FILM COATED ORAL at 17:43

## 2024-01-01 RX ADMIN — MIDODRINE HYDROCHLORIDE 30 MILLIGRAM(S): 2.5 TABLET ORAL at 11:31

## 2024-01-01 RX ADMIN — Medication 5 UNIT(S): at 12:42

## 2024-01-01 RX ADMIN — MIDODRINE HYDROCHLORIDE 10 MILLIGRAM(S): 2.5 TABLET ORAL at 05:17

## 2024-01-01 RX ADMIN — Medication 8 UNIT(S): at 08:56

## 2024-01-01 RX ADMIN — Medication 100 MILLIGRAM(S): at 05:10

## 2024-01-01 RX ADMIN — Medication 500 MILLIGRAM(S): at 13:20

## 2024-01-01 RX ADMIN — APIXABAN 10 MILLIGRAM(S): 2.5 TABLET, FILM COATED ORAL at 09:21

## 2024-01-01 RX ADMIN — Medication 500000 UNIT(S): at 12:07

## 2024-01-01 RX ADMIN — Medication 500000 UNIT(S): at 05:46

## 2024-01-01 RX ADMIN — ONDANSETRON HYDROCHLORIDE 4 MILLIGRAM(S): 2 INJECTION INTRAMUSCULAR; INTRAVENOUS at 01:31

## 2024-01-01 RX ADMIN — APIXABAN 5 MILLIGRAM(S): 2.5 TABLET, FILM COATED ORAL at 17:17

## 2024-01-01 RX ADMIN — PANTOPRAZOLE SODIUM 40 MILLIGRAM(S): 20 TABLET, DELAYED RELEASE ORAL at 05:43

## 2024-01-01 RX ADMIN — Medication 2: at 08:09

## 2024-01-01 RX ADMIN — Medication 75 MICROGRAM(S): at 06:53

## 2024-01-01 RX ADMIN — Medication 1 APPLICATION(S): at 06:38

## 2024-01-01 RX ADMIN — CHLORHEXIDINE GLUCONATE 1 APPLICATION(S): 213 SOLUTION TOPICAL at 05:32

## 2024-01-01 RX ADMIN — Medication 1 PACKET(S): at 14:26

## 2024-01-01 RX ADMIN — HEPARIN SODIUM 5000 UNIT(S): 5000 INJECTION INTRAVENOUS; SUBCUTANEOUS at 14:17

## 2024-01-01 RX ADMIN — Medication 1 TABLET(S): at 12:37

## 2024-01-01 RX ADMIN — HEPARIN SODIUM 5000 UNIT(S): 5000 INJECTION INTRAVENOUS; SUBCUTANEOUS at 21:23

## 2024-01-01 RX ADMIN — Medication 300 MILLIGRAM(S): at 12:39

## 2024-01-01 RX ADMIN — MIDODRINE HYDROCHLORIDE 30 MILLIGRAM(S): 2.5 TABLET ORAL at 14:01

## 2024-01-01 RX ADMIN — Medication 75 MICROGRAM(S): at 06:04

## 2024-01-01 RX ADMIN — Medication 1 TABLET(S): at 12:41

## 2024-01-01 RX ADMIN — SENNA PLUS 2 TABLET(S): 8.6 TABLET ORAL at 21:51

## 2024-01-01 RX ADMIN — PANTOPRAZOLE SODIUM 40 MILLIGRAM(S): 20 TABLET, DELAYED RELEASE ORAL at 06:36

## 2024-01-01 RX ADMIN — Medication 1 APPLICATION(S): at 05:35

## 2024-01-01 RX ADMIN — MIDODRINE HYDROCHLORIDE 20 MILLIGRAM(S): 10 TABLET ORAL at 17:11

## 2024-01-01 RX ADMIN — Medication 500000 UNIT(S): at 05:30

## 2024-01-01 RX ADMIN — Medication 3 UNIT(S)/MIN: at 02:13

## 2024-01-01 RX ADMIN — SODIUM CHLORIDE 50 MILLILITER(S): 9 INJECTION, SOLUTION INTRAVENOUS at 20:53

## 2024-01-01 RX ADMIN — INSULIN LISPRO 4: 100 INJECTION, SOLUTION SUBCUTANEOUS at 18:01

## 2024-01-01 RX ADMIN — MIDODRINE HYDROCHLORIDE 15 MILLIGRAM(S): 2.5 TABLET ORAL at 18:17

## 2024-01-01 RX ADMIN — Medication 10 MILLIGRAM(S): at 17:32

## 2024-01-01 RX ADMIN — APIXABAN 5 MILLIGRAM(S): 2.5 TABLET, FILM COATED ORAL at 17:39

## 2024-01-01 RX ADMIN — Medication 325 MILLIGRAM(S): at 05:34

## 2024-01-01 RX ADMIN — MIDODRINE HYDROCHLORIDE 15 MILLIGRAM(S): 2.5 TABLET ORAL at 17:04

## 2024-01-01 RX ADMIN — APIXABAN 5 MILLIGRAM(S): 2.5 TABLET, FILM COATED ORAL at 21:41

## 2024-01-01 RX ADMIN — Medication 60 MILLIGRAM(S): at 06:08

## 2024-01-01 RX ADMIN — Medication 300 MILLIGRAM(S): at 11:44

## 2024-01-01 RX ADMIN — PYRIDOSTIGMINE BROMIDE 60 MILLIGRAM(S): 60 SOLUTION ORAL at 10:57

## 2024-01-01 RX ADMIN — CHLORHEXIDINE GLUCONATE 15 MILLILITER(S): 213 SOLUTION TOPICAL at 17:11

## 2024-01-01 RX ADMIN — Medication 10 UNIT(S): at 09:59

## 2024-01-01 RX ADMIN — Medication 1 APPLICATION(S): at 06:37

## 2024-01-01 RX ADMIN — Medication 325 MILLIGRAM(S): at 17:59

## 2024-01-01 RX ADMIN — Medication 1 APPLICATION(S): at 07:08

## 2024-01-01 RX ADMIN — Medication 325 MILLIGRAM(S): at 05:12

## 2024-01-01 RX ADMIN — Medication 1 APPLICATION(S): at 06:50

## 2024-01-01 RX ADMIN — HEPARIN SODIUM 5000 UNIT(S): 5000 INJECTION INTRAVENOUS; SUBCUTANEOUS at 17:36

## 2024-01-01 RX ADMIN — Medication 650 MILLIGRAM(S): at 22:48

## 2024-01-01 RX ADMIN — MIDODRINE HYDROCHLORIDE 30 MILLIGRAM(S): 2.5 TABLET ORAL at 11:35

## 2024-01-01 RX ADMIN — CALAMINE AND ZINC OXIDE AND PHENOL 1 APPLICATION(S): 160; 10 LOTION TOPICAL at 21:25

## 2024-01-01 RX ADMIN — Medication 81 MILLIGRAM(S): at 11:47

## 2024-01-01 RX ADMIN — INSULIN GLARGINE 26 UNIT(S): 100 INJECTION, SOLUTION SUBCUTANEOUS at 21:52

## 2024-01-01 RX ADMIN — INSULIN GLARGINE 10 UNIT(S): 100 INJECTION, SOLUTION SUBCUTANEOUS at 22:21

## 2024-01-01 RX ADMIN — Medication 3: at 08:11

## 2024-01-01 RX ADMIN — MIDODRINE HYDROCHLORIDE 15 MILLIGRAM(S): 2.5 TABLET ORAL at 11:22

## 2024-01-01 RX ADMIN — CALAMINE AND ZINC OXIDE AND PHENOL 1 APPLICATION(S): 160; 10 LOTION TOPICAL at 22:16

## 2024-01-01 RX ADMIN — Medication 75 MICROGRAM(S): at 04:41

## 2024-01-01 RX ADMIN — Medication 1 APPLICATION(S): at 18:05

## 2024-01-01 RX ADMIN — HYDROMORPHONE HCL 1 MILLIGRAM(S): 0.2 INJECTION, SOLUTION INTRAVENOUS at 01:31

## 2024-01-01 RX ADMIN — Medication 15 MILLILITER(S): at 06:15

## 2024-01-01 RX ADMIN — Medication 81 MILLIGRAM(S): at 12:03

## 2024-01-01 RX ADMIN — APIXABAN 5 MILLIGRAM(S): 2.5 TABLET, FILM COATED ORAL at 17:30

## 2024-01-01 RX ADMIN — MIDODRINE HYDROCHLORIDE 10 MILLIGRAM(S): 2.5 TABLET ORAL at 13:01

## 2024-01-01 RX ADMIN — INSULIN GLARGINE 12 UNIT(S): 100 INJECTION, SOLUTION SUBCUTANEOUS at 09:14

## 2024-01-01 RX ADMIN — DROXIDOPA 600 MILLIGRAM(S): 100 CAPSULE ORAL at 14:01

## 2024-01-01 RX ADMIN — Medication 81 MILLIGRAM(S): at 12:55

## 2024-01-01 RX ADMIN — HEPARIN SODIUM 0.6 UNIT(S)/HR: 5000 INJECTION INTRAVENOUS; SUBCUTANEOUS at 12:31

## 2024-01-01 RX ADMIN — SENNA PLUS 2 TABLET(S): 8.6 TABLET ORAL at 23:13

## 2024-01-01 RX ADMIN — MIDODRINE HYDROCHLORIDE 15 MILLIGRAM(S): 2.5 TABLET ORAL at 05:36

## 2024-01-01 RX ADMIN — BUMETANIDE 10 MG/HR: 0.25 INJECTION INTRAMUSCULAR; INTRAVENOUS at 12:51

## 2024-01-01 RX ADMIN — CALAMINE AND ZINC OXIDE AND PHENOL 1 APPLICATION(S): 160; 10 LOTION TOPICAL at 13:02

## 2024-01-01 RX ADMIN — Medication 7 UNIT(S): at 10:12

## 2024-01-01 RX ADMIN — HEPARIN SODIUM 5000 UNIT(S): 5000 INJECTION INTRAVENOUS; SUBCUTANEOUS at 21:49

## 2024-01-01 RX ADMIN — Medication 325 MILLIGRAM(S): at 05:40

## 2024-01-01 RX ADMIN — PANTOPRAZOLE SODIUM 40 MILLIGRAM(S): 20 TABLET, DELAYED RELEASE ORAL at 16:50

## 2024-01-01 RX ADMIN — APIXABAN 10 MILLIGRAM(S): 2.5 TABLET, FILM COATED ORAL at 11:04

## 2024-01-01 RX ADMIN — MIDODRINE HYDROCHLORIDE 30 MILLIGRAM(S): 2.5 TABLET ORAL at 14:05

## 2024-01-01 RX ADMIN — Medication 100 MILLIGRAM(S): at 17:42

## 2024-01-01 RX ADMIN — MUPIROCIN 1 APPLICATION(S): 20 OINTMENT TOPICAL at 13:11

## 2024-01-01 RX ADMIN — APIXABAN 5 MILLIGRAM(S): 2.5 TABLET, FILM COATED ORAL at 17:41

## 2024-01-01 RX ADMIN — Medication 1 TABLET(S): at 12:40

## 2024-01-01 RX ADMIN — MIDODRINE HYDROCHLORIDE 10 MILLIGRAM(S): 2.5 TABLET ORAL at 12:09

## 2024-01-01 RX ADMIN — ASPIRIN 81 MILLIGRAM(S): 325 TABLET, FILM COATED ORAL at 14:24

## 2024-01-01 RX ADMIN — Medication 1: at 17:40

## 2024-01-01 RX ADMIN — Medication 325 MILLIGRAM(S): at 06:10

## 2024-01-01 RX ADMIN — APIXABAN 10 MILLIGRAM(S): 2.5 TABLET, FILM COATED ORAL at 21:42

## 2024-01-01 RX ADMIN — DAPTOMYCIN 134 MILLIGRAM(S): 500 INJECTION, POWDER, LYOPHILIZED, FOR SUSPENSION INTRAVENOUS at 23:11

## 2024-01-01 RX ADMIN — Medication 75 MICROGRAM(S): at 05:43

## 2024-01-01 RX ADMIN — FLUDROCORTISONE ACETATE 0.1 MILLIGRAM(S): 0.1 TABLET ORAL at 05:58

## 2024-01-01 RX ADMIN — Medication 40 MILLIGRAM(S): at 13:38

## 2024-01-01 RX ADMIN — Medication 75 MICROGRAM(S): at 05:26

## 2024-01-01 RX ADMIN — Medication 81 MILLIGRAM(S): at 13:23

## 2024-01-01 RX ADMIN — Medication 10 MILLIGRAM(S): at 22:24

## 2024-01-01 RX ADMIN — Medication 2 UNIT(S): at 09:03

## 2024-01-01 RX ADMIN — APIXABAN 5 MILLIGRAM(S): 2.5 TABLET, FILM COATED ORAL at 06:19

## 2024-01-01 RX ADMIN — Medication 325 MILLIGRAM(S): at 06:20

## 2024-01-01 RX ADMIN — Medication 325 MILLIGRAM(S): at 05:57

## 2024-01-01 RX ADMIN — ATORVASTATIN CALCIUM 80 MILLIGRAM(S): 20 TABLET, FILM COATED ORAL at 23:32

## 2024-01-01 RX ADMIN — HEPARIN SODIUM 5000 UNIT(S): 5000 INJECTION INTRAVENOUS; SUBCUTANEOUS at 13:41

## 2024-01-01 RX ADMIN — Medication 650 MILLIGRAM(S): at 11:38

## 2024-01-01 RX ADMIN — Medication 8 UNIT(S): at 17:01

## 2024-01-01 RX ADMIN — Medication 2: at 09:18

## 2024-01-01 RX ADMIN — Medication 12.5 GRAM(S): at 07:40

## 2024-01-01 RX ADMIN — Medication 325 MILLIGRAM(S): at 17:37

## 2024-01-01 RX ADMIN — APIXABAN 5 MILLIGRAM(S): 2.5 TABLET, FILM COATED ORAL at 06:34

## 2024-01-01 RX ADMIN — ATORVASTATIN CALCIUM 80 MILLIGRAM(S): 80 TABLET, FILM COATED ORAL at 22:11

## 2024-01-01 RX ADMIN — Medication 75 MICROGRAM(S): at 05:13

## 2024-01-01 RX ADMIN — Medication 300 MILLIGRAM(S): at 12:33

## 2024-01-01 RX ADMIN — INSULIN GLARGINE 16 UNIT(S): 100 INJECTION, SOLUTION SUBCUTANEOUS at 22:13

## 2024-01-01 RX ADMIN — INSULIN GLARGINE 14 UNIT(S): 100 INJECTION, SOLUTION SUBCUTANEOUS at 23:12

## 2024-01-01 RX ADMIN — Medication 500000 UNIT(S): at 15:04

## 2024-01-01 RX ADMIN — CHLORHEXIDINE GLUCONATE 1 APPLICATION(S): 213 SOLUTION TOPICAL at 00:03

## 2024-01-01 RX ADMIN — DROXIDOPA 400 MILLIGRAM(S): 100 CAPSULE ORAL at 05:56

## 2024-01-01 RX ADMIN — HEPARIN SODIUM 5000 UNIT(S): 5000 INJECTION INTRAVENOUS; SUBCUTANEOUS at 05:31

## 2024-01-01 RX ADMIN — Medication 5 UNIT(S): at 08:48

## 2024-01-01 RX ADMIN — Medication 4 UNIT(S): at 16:40

## 2024-01-01 RX ADMIN — INSULIN GLARGINE 21 UNIT(S): 100 INJECTION, SOLUTION SUBCUTANEOUS at 21:56

## 2024-01-01 RX ADMIN — Medication 8 UNIT(S): at 17:49

## 2024-01-01 RX ADMIN — Medication 8 UNIT(S): at 09:13

## 2024-01-01 RX ADMIN — INSULIN LISPRO 6: 100 INJECTION, SOLUTION SUBCUTANEOUS at 06:06

## 2024-01-01 RX ADMIN — HEPARIN SODIUM 5000 UNIT(S): 5000 INJECTION INTRAVENOUS; SUBCUTANEOUS at 21:16

## 2024-01-01 RX ADMIN — Medication 4: at 11:58

## 2024-01-01 RX ADMIN — DROXIDOPA 600 MILLIGRAM(S): 100 CAPSULE ORAL at 17:07

## 2024-01-01 RX ADMIN — Medication 50 MILLILITER(S): at 12:03

## 2024-01-01 RX ADMIN — HEPARIN SODIUM 5000 UNIT(S): 5000 INJECTION INTRAVENOUS; SUBCUTANEOUS at 22:46

## 2024-01-01 RX ADMIN — CHLORHEXIDINE GLUCONATE 1 APPLICATION(S): 213 SOLUTION TOPICAL at 05:30

## 2024-01-01 RX ADMIN — VASOPRESSIN 9 UNIT(S)/MIN: 20 INJECTION INTRAVENOUS at 12:30

## 2024-01-01 RX ADMIN — Medication 1 TABLET(S): at 11:36

## 2024-01-01 RX ADMIN — Medication 325 MILLIGRAM(S): at 05:17

## 2024-01-01 RX ADMIN — Medication 500000 UNIT(S): at 13:03

## 2024-01-01 RX ADMIN — PYRIDOSTIGMINE BROMIDE 60 MILLIGRAM(S): 60 TABLET ORAL at 05:18

## 2024-01-01 RX ADMIN — Medication 250 MILLIGRAM(S): at 05:38

## 2024-01-01 RX ADMIN — ATORVASTATIN CALCIUM 80 MILLIGRAM(S): 20 TABLET, FILM COATED ORAL at 21:14

## 2024-01-01 RX ADMIN — Medication 75 MICROGRAM(S): at 06:05

## 2024-01-01 RX ADMIN — INSULIN GLARGINE 12 UNIT(S): 100 INJECTION, SOLUTION SUBCUTANEOUS at 23:19

## 2024-01-01 RX ADMIN — HEPARIN SODIUM 5000 UNIT(S): 5000 INJECTION INTRAVENOUS; SUBCUTANEOUS at 14:05

## 2024-01-01 RX ADMIN — INSULIN GLARGINE 16 UNIT(S): 100 INJECTION, SOLUTION SUBCUTANEOUS at 22:05

## 2024-01-01 RX ADMIN — INSULIN GLARGINE 10 UNIT(S): 100 INJECTION, SOLUTION SUBCUTANEOUS at 22:29

## 2024-01-01 RX ADMIN — Medication 325 MILLIGRAM(S): at 17:02

## 2024-01-01 RX ADMIN — Medication 1: at 18:15

## 2024-01-01 RX ADMIN — INSULIN GLARGINE 32 UNIT(S): 100 INJECTION, SOLUTION SUBCUTANEOUS at 22:14

## 2024-01-01 RX ADMIN — INSULIN GLARGINE 26 UNIT(S): 100 INJECTION, SOLUTION SUBCUTANEOUS at 22:39

## 2024-01-01 RX ADMIN — Medication 1: at 08:38

## 2024-01-01 RX ADMIN — Medication 81 MILLIGRAM(S): at 11:50

## 2024-01-01 RX ADMIN — Medication 325 MILLIGRAM(S): at 18:20

## 2024-01-01 RX ADMIN — Medication 81 MILLIGRAM(S): at 09:54

## 2024-01-01 RX ADMIN — Medication 1 APPLICATION(S): at 06:17

## 2024-01-01 RX ADMIN — CEFTRIAXONE 100 MILLIGRAM(S): 500 INJECTION, POWDER, FOR SOLUTION INTRAMUSCULAR; INTRAVENOUS at 18:15

## 2024-01-01 RX ADMIN — Medication 50 MILLIGRAM(S): at 23:56

## 2024-01-01 RX ADMIN — Medication 1: at 17:41

## 2024-01-01 RX ADMIN — Medication 50 MILLILITER(S): at 12:34

## 2024-01-01 RX ADMIN — Medication 1 TABLET(S): at 13:32

## 2024-01-01 RX ADMIN — Medication 5 UNIT(S): at 12:28

## 2024-01-01 RX ADMIN — Medication 4 UNIT(S): at 08:12

## 2024-01-01 RX ADMIN — Medication 1: at 18:39

## 2024-01-01 RX ADMIN — Medication 1: at 16:39

## 2024-01-01 RX ADMIN — Medication 25 MILLIGRAM(S): at 12:43

## 2024-01-01 RX ADMIN — MIDODRINE HYDROCHLORIDE 30 MILLIGRAM(S): 2.5 TABLET ORAL at 22:46

## 2024-01-01 RX ADMIN — Medication 81 MILLIGRAM(S): at 12:57

## 2024-01-01 RX ADMIN — Medication 50 MILLILITER(S): at 00:29

## 2024-01-01 RX ADMIN — Medication 500000 UNIT(S): at 17:19

## 2024-01-01 RX ADMIN — Medication 81 MILLIGRAM(S): at 12:22

## 2024-01-01 RX ADMIN — HEPARIN SODIUM 5000 UNIT(S): 5000 INJECTION INTRAVENOUS; SUBCUTANEOUS at 13:12

## 2024-01-01 RX ADMIN — Medication 8 UNIT(S): at 08:52

## 2024-01-01 RX ADMIN — HEPARIN SODIUM 5000 UNIT(S): 5000 INJECTION INTRAVENOUS; SUBCUTANEOUS at 13:44

## 2024-01-01 RX ADMIN — Medication 1 PACKET(S): at 21:06

## 2024-01-01 RX ADMIN — CHLORHEXIDINE GLUCONATE 15 MILLILITER(S): 213 SOLUTION TOPICAL at 23:12

## 2024-01-01 RX ADMIN — DROXIDOPA 200 MILLIGRAM(S): 100 CAPSULE ORAL at 05:08

## 2024-01-01 RX ADMIN — Medication 75 MICROGRAM(S): at 05:41

## 2024-01-01 RX ADMIN — Medication 325 MILLIGRAM(S): at 08:12

## 2024-01-01 RX ADMIN — Medication 1 APPLICATION(S): at 17:52

## 2024-01-01 RX ADMIN — Medication 1 APPLICATION(S): at 12:41

## 2024-01-01 RX ADMIN — MUPIROCIN 1 APPLICATION(S): 20 OINTMENT TOPICAL at 22:28

## 2024-01-01 RX ADMIN — CEFEPIME 100 MILLIGRAM(S): 1 INJECTION, POWDER, FOR SOLUTION INTRAMUSCULAR; INTRAVENOUS at 06:31

## 2024-01-01 RX ADMIN — Medication 650 MILLIGRAM(S): at 23:48

## 2024-01-01 RX ADMIN — Medication 325 MILLIGRAM(S): at 17:35

## 2024-01-01 RX ADMIN — Medication 10 UNIT(S): at 12:49

## 2024-01-01 RX ADMIN — Medication 1 TABLET(S): at 12:18

## 2024-01-01 RX ADMIN — HEPARIN SODIUM 5000 UNIT(S): 5000 INJECTION INTRAVENOUS; SUBCUTANEOUS at 06:12

## 2024-01-01 RX ADMIN — PANTOPRAZOLE SODIUM 40 MILLIGRAM(S): 40 INJECTION, POWDER, FOR SOLUTION INTRAVENOUS at 08:13

## 2024-01-01 RX ADMIN — SODIUM CHLORIDE 75 MILLILITER(S): 9 INJECTION, SOLUTION INTRAVENOUS at 01:51

## 2024-01-01 RX ADMIN — MIDODRINE HYDROCHLORIDE 15 MILLIGRAM(S): 2.5 TABLET ORAL at 13:19

## 2024-01-01 RX ADMIN — Medication 2: at 07:58

## 2024-01-01 RX ADMIN — Medication 3: at 18:18

## 2024-01-01 RX ADMIN — Medication 8 UNIT(S): at 16:46

## 2024-01-01 RX ADMIN — DROXIDOPA 200 MILLIGRAM(S): 100 CAPSULE ORAL at 13:20

## 2024-01-01 RX ADMIN — Medication 100 MILLIGRAM(S): at 17:31

## 2024-01-01 RX ADMIN — PANTOPRAZOLE SODIUM 40 MILLIGRAM(S): 20 TABLET, DELAYED RELEASE ORAL at 05:24

## 2024-01-01 RX ADMIN — MIDODRINE HYDROCHLORIDE 15 MILLIGRAM(S): 2.5 TABLET ORAL at 05:27

## 2024-01-01 RX ADMIN — Medication 6.19 MICROGRAM(S)/KG/MIN: at 00:56

## 2024-01-01 RX ADMIN — Medication 1: at 21:15

## 2024-01-01 RX ADMIN — Medication 1: at 17:24

## 2024-01-01 RX ADMIN — Medication 4 UNIT(S): at 16:28

## 2024-01-01 RX ADMIN — Medication 2: at 17:20

## 2024-01-01 RX ADMIN — Medication 650 MILLIGRAM(S): at 19:22

## 2024-01-01 RX ADMIN — FENTANYL CITRATE 50 MICROGRAM(S): 50 INJECTION, SOLUTION INTRAMUSCULAR; INTRAVENOUS at 08:30

## 2024-01-01 RX ADMIN — MIDODRINE HYDROCHLORIDE 30 MILLIGRAM(S): 2.5 TABLET ORAL at 06:02

## 2024-01-01 RX ADMIN — ATORVASTATIN CALCIUM 80 MILLIGRAM(S): 80 TABLET, FILM COATED ORAL at 22:44

## 2024-01-01 RX ADMIN — APIXABAN 5 MILLIGRAM(S): 2.5 TABLET, FILM COATED ORAL at 06:05

## 2024-01-01 RX ADMIN — Medication 5 UNIT(S): at 12:48

## 2024-01-01 RX ADMIN — ATORVASTATIN CALCIUM 80 MILLIGRAM(S): 80 TABLET, FILM COATED ORAL at 00:12

## 2024-01-01 RX ADMIN — PROPOFOL 24.7 MICROGRAM(S)/KG/MIN: 10 INJECTION, EMULSION INTRAVENOUS at 07:19

## 2024-01-01 RX ADMIN — Medication 1 APPLICATION(S): at 11:36

## 2024-01-01 RX ADMIN — Medication 1 PACKET(S): at 09:32

## 2024-01-01 RX ADMIN — SENNA PLUS 2 TABLET(S): 8.6 TABLET ORAL at 21:50

## 2024-01-01 RX ADMIN — Medication 100 MILLIGRAM(S): at 05:42

## 2024-01-01 RX ADMIN — Medication 325 MILLIGRAM(S): at 06:47

## 2024-01-01 RX ADMIN — Medication 1 APPLICATION(S): at 12:46

## 2024-01-01 RX ADMIN — Medication 8 UNIT(S): at 13:44

## 2024-01-01 RX ADMIN — Medication 325 MILLIGRAM(S): at 17:27

## 2024-01-01 RX ADMIN — CHLORHEXIDINE GLUCONATE 1 APPLICATION(S): 213 SOLUTION TOPICAL at 05:00

## 2024-01-01 RX ADMIN — Medication 1 PACKET(S): at 07:56

## 2024-01-01 RX ADMIN — Medication 4: at 17:16

## 2024-01-01 RX ADMIN — Medication 1 APPLICATION(S): at 21:47

## 2024-01-01 RX ADMIN — MIDODRINE HYDROCHLORIDE 30 MILLIGRAM(S): 2.5 TABLET ORAL at 21:41

## 2024-01-01 RX ADMIN — MIDODRINE HYDROCHLORIDE 15 MILLIGRAM(S): 2.5 TABLET ORAL at 05:40

## 2024-01-01 RX ADMIN — Medication 325 MILLIGRAM(S): at 21:03

## 2024-01-01 RX ADMIN — Medication 1 MILLIGRAM(S): at 22:16

## 2024-01-01 RX ADMIN — APIXABAN 10 MILLIGRAM(S): 2.5 TABLET, FILM COATED ORAL at 21:49

## 2024-01-01 RX ADMIN — Medication 500000 UNIT(S): at 06:42

## 2024-01-01 RX ADMIN — Medication 2: at 09:50

## 2024-01-01 RX ADMIN — Medication 1: at 09:13

## 2024-01-01 RX ADMIN — Medication 1 APPLICATION(S): at 06:02

## 2024-01-01 RX ADMIN — Medication 1 APPLICATION(S): at 18:11

## 2024-01-01 RX ADMIN — Medication 50 MILLIGRAM(S): at 11:46

## 2024-01-01 RX ADMIN — APIXABAN 10 MILLIGRAM(S): 2.5 TABLET, FILM COATED ORAL at 09:15

## 2024-01-01 RX ADMIN — INSULIN GLARGINE 10 UNIT(S): 100 INJECTION, SOLUTION SUBCUTANEOUS at 21:47

## 2024-01-01 RX ADMIN — Medication 325 MILLIGRAM(S): at 17:40

## 2024-01-01 RX ADMIN — Medication 3: at 21:52

## 2024-01-01 RX ADMIN — Medication 325 MILLIGRAM(S): at 11:47

## 2024-01-01 RX ADMIN — Medication 650 MILLIGRAM(S): at 23:31

## 2024-01-01 RX ADMIN — Medication 8 UNIT(S): at 09:10

## 2024-01-01 RX ADMIN — SENNA PLUS 2 TABLET(S): 8.6 TABLET ORAL at 22:26

## 2024-01-01 RX ADMIN — Medication 2: at 17:34

## 2024-01-01 RX ADMIN — MIDODRINE HYDROCHLORIDE 15 MILLIGRAM(S): 2.5 TABLET ORAL at 11:57

## 2024-01-01 RX ADMIN — CALAMINE AND ZINC OXIDE AND PHENOL 1 APPLICATION(S): 160; 10 LOTION TOPICAL at 23:12

## 2024-01-01 RX ADMIN — INSULIN GLARGINE 26 UNIT(S): 100 INJECTION, SOLUTION SUBCUTANEOUS at 22:33

## 2024-01-01 RX ADMIN — Medication 2 UNIT(S): at 09:16

## 2024-01-01 RX ADMIN — Medication 8 UNIT(S): at 08:44

## 2024-01-01 RX ADMIN — Medication 1 TABLET(S): at 13:24

## 2024-01-01 RX ADMIN — Medication 1 TABLET(S): at 12:03

## 2024-01-01 RX ADMIN — DROXIDOPA 600 MILLIGRAM(S): 100 CAPSULE ORAL at 17:38

## 2024-01-01 RX ADMIN — PANTOPRAZOLE SODIUM 40 MILLIGRAM(S): 20 TABLET, DELAYED RELEASE ORAL at 17:18

## 2024-01-01 RX ADMIN — MIDODRINE HYDROCHLORIDE 15 MILLIGRAM(S): 2.5 TABLET ORAL at 18:03

## 2024-01-01 RX ADMIN — Medication 2 UNIT(S): at 17:34

## 2024-01-01 RX ADMIN — Medication 12.5 GRAM(S): at 08:56

## 2024-01-01 RX ADMIN — INSULIN GLARGINE 25 UNIT(S): 100 INJECTION, SOLUTION SUBCUTANEOUS at 22:20

## 2024-01-01 RX ADMIN — BUMETANIDE 10 MG/HR: 0.25 INJECTION INTRAMUSCULAR; INTRAVENOUS at 21:45

## 2024-01-01 RX ADMIN — DROXIDOPA 100 MILLIGRAM(S): 100 CAPSULE ORAL at 22:20

## 2024-01-01 RX ADMIN — FLUDROCORTISONE ACETATE 0.1 MILLIGRAM(S): 0.1 TABLET ORAL at 06:22

## 2024-01-01 RX ADMIN — CEFEPIME 100 MILLIGRAM(S): 1 INJECTION, POWDER, FOR SOLUTION INTRAMUSCULAR; INTRAVENOUS at 17:58

## 2024-01-01 RX ADMIN — INSULIN GLARGINE 15 UNIT(S): 100 INJECTION, SOLUTION SUBCUTANEOUS at 21:44

## 2024-01-01 RX ADMIN — Medication 81 MILLIGRAM(S): at 13:16

## 2024-01-01 RX ADMIN — Medication 5 UNIT(S): at 18:07

## 2024-01-01 RX ADMIN — NYSTATIN CREAM 1 APPLICATION(S): 100000 CREAM TOPICAL at 06:16

## 2024-01-01 RX ADMIN — PSYLLIUM HUSK 1 PACKET(S): 3.4 POWDER, FOR SOLUTION ORAL at 17:24

## 2024-01-01 RX ADMIN — INSULIN LISPRO 3 UNIT(S): 100 INJECTION, SOLUTION SUBCUTANEOUS at 17:03

## 2024-01-01 RX ADMIN — ONDANSETRON 4 MILLIGRAM(S): 8 TABLET, FILM COATED ORAL at 08:16

## 2024-01-01 RX ADMIN — Medication 500000 UNIT(S): at 11:45

## 2024-01-01 RX ADMIN — HEPARIN SODIUM 5000 UNIT(S): 5000 INJECTION INTRAVENOUS; SUBCUTANEOUS at 22:02

## 2024-01-01 RX ADMIN — APIXABAN 5 MILLIGRAM(S): 5 TABLET, FILM COATED ORAL at 17:00

## 2024-01-01 RX ADMIN — HEPARIN SODIUM 5000 UNIT(S): 5000 INJECTION INTRAVENOUS; SUBCUTANEOUS at 15:21

## 2024-01-01 RX ADMIN — APIXABAN 5 MILLIGRAM(S): 2.5 TABLET, FILM COATED ORAL at 17:18

## 2024-01-01 RX ADMIN — Medication 500000 UNIT(S): at 00:03

## 2024-01-01 RX ADMIN — SENNA PLUS 2 TABLET(S): 8.6 TABLET ORAL at 22:12

## 2024-01-01 RX ADMIN — INSULIN GLARGINE 10 UNIT(S): 100 INJECTION, SOLUTION SUBCUTANEOUS at 22:23

## 2024-01-01 RX ADMIN — INSULIN GLARGINE 15 UNIT(S): 100 INJECTION, SOLUTION SUBCUTANEOUS at 22:09

## 2024-01-01 RX ADMIN — MIDODRINE HYDROCHLORIDE 10 MILLIGRAM(S): 2.5 TABLET ORAL at 13:44

## 2024-01-01 RX ADMIN — INSULIN GLARGINE 15 UNIT(S): 100 INJECTION, SOLUTION SUBCUTANEOUS at 21:48

## 2024-01-01 RX ADMIN — CALAMINE AND ZINC OXIDE AND PHENOL 1 APPLICATION(S): 160; 10 LOTION TOPICAL at 13:13

## 2024-01-01 RX ADMIN — MIDODRINE HYDROCHLORIDE 30 MILLIGRAM(S): 10 TABLET ORAL at 06:38

## 2024-01-01 RX ADMIN — Medication 5 UNIT(S): at 18:02

## 2024-01-01 RX ADMIN — HEPARIN SODIUM 5000 UNIT(S): 5000 INJECTION INTRAVENOUS; SUBCUTANEOUS at 06:36

## 2024-01-01 RX ADMIN — Medication 500000 UNIT(S): at 06:03

## 2024-01-01 RX ADMIN — Medication 300 MILLIGRAM(S): at 11:28

## 2024-01-01 RX ADMIN — FLUDROCORTISONE ACETATE 0.2 MILLIGRAM(S): 0.1 TABLET ORAL at 05:11

## 2024-01-01 RX ADMIN — Medication 1 APPLICATION(S): at 06:10

## 2024-01-01 RX ADMIN — Medication 8 UNIT(S): at 12:29

## 2024-01-01 RX ADMIN — INSULIN HUMAN 5 UNIT(S): 100 INJECTION, SOLUTION SUBCUTANEOUS at 10:52

## 2024-01-01 RX ADMIN — CEFTRIAXONE 100 MILLIGRAM(S): 500 INJECTION, POWDER, FOR SOLUTION INTRAMUSCULAR; INTRAVENOUS at 09:04

## 2024-01-01 RX ADMIN — Medication 75 MICROGRAM(S): at 05:29

## 2024-01-01 RX ADMIN — DROXIDOPA 400 MILLIGRAM(S): 100 CAPSULE ORAL at 06:19

## 2024-01-01 RX ADMIN — Medication 25 GRAM(S): at 20:26

## 2024-01-01 RX ADMIN — Medication 5 UNIT(S): at 12:58

## 2024-01-01 RX ADMIN — Medication 5 UNIT(S): at 08:59

## 2024-01-01 RX ADMIN — INSULIN GLARGINE 8 UNIT(S): 100 INJECTION, SOLUTION SUBCUTANEOUS at 23:03

## 2024-01-01 RX ADMIN — Medication 250 MILLIGRAM(S): at 05:45

## 2024-01-01 RX ADMIN — Medication 325 MILLIGRAM(S): at 17:50

## 2024-01-01 RX ADMIN — PANTOPRAZOLE SODIUM 40 MILLIGRAM(S): 20 TABLET, DELAYED RELEASE ORAL at 18:07

## 2024-01-01 RX ADMIN — Medication 8 UNIT(S): at 09:18

## 2024-01-01 RX ADMIN — SODIUM CHLORIDE 84 MILLILITER(S): 9 INJECTION INTRAMUSCULAR; INTRAVENOUS; SUBCUTANEOUS at 13:55

## 2024-01-01 RX ADMIN — PYRIDOSTIGMINE BROMIDE 60 MILLIGRAM(S): 60 TABLET ORAL at 19:07

## 2024-01-01 RX ADMIN — INSULIN GLARGINE 12 UNIT(S): 100 INJECTION, SOLUTION SUBCUTANEOUS at 21:29

## 2024-01-01 RX ADMIN — HEPARIN SODIUM 5000 UNIT(S): 5000 INJECTION INTRAVENOUS; SUBCUTANEOUS at 05:53

## 2024-01-01 RX ADMIN — Medication 1 APPLICATION(S): at 05:04

## 2024-01-01 RX ADMIN — INSULIN GLARGINE 18 UNIT(S): 100 INJECTION, SOLUTION SUBCUTANEOUS at 21:50

## 2024-01-01 RX ADMIN — MIDODRINE HYDROCHLORIDE 20 MILLIGRAM(S): 2.5 TABLET ORAL at 06:16

## 2024-01-01 RX ADMIN — PANTOPRAZOLE SODIUM 40 MILLIGRAM(S): 20 TABLET, DELAYED RELEASE ORAL at 05:10

## 2024-01-01 RX ADMIN — PANTOPRAZOLE SODIUM 40 MILLIGRAM(S): 20 TABLET, DELAYED RELEASE ORAL at 17:25

## 2024-01-01 RX ADMIN — Medication 325 MILLIGRAM(S): at 06:22

## 2024-01-01 RX ADMIN — Medication 75 MICROGRAM(S): at 05:16

## 2024-01-01 RX ADMIN — Medication 81 MILLIGRAM(S): at 14:02

## 2024-01-01 RX ADMIN — Medication 1 TABLET(S): at 13:22

## 2024-01-01 RX ADMIN — HEPARIN SODIUM 5000 UNIT(S): 5000 INJECTION INTRAVENOUS; SUBCUTANEOUS at 13:15

## 2024-01-01 RX ADMIN — Medication 1 APPLICATION(S): at 18:30

## 2024-01-01 RX ADMIN — CALAMINE AND ZINC OXIDE AND PHENOL 1 APPLICATION(S): 160; 10 LOTION TOPICAL at 06:11

## 2024-01-01 RX ADMIN — HEPARIN SODIUM 5000 UNIT(S): 5000 INJECTION INTRAVENOUS; SUBCUTANEOUS at 13:17

## 2024-01-01 RX ADMIN — PANTOPRAZOLE SODIUM 40 MILLIGRAM(S): 20 TABLET, DELAYED RELEASE ORAL at 18:06

## 2024-01-01 RX ADMIN — Medication 81 MILLIGRAM(S): at 11:37

## 2024-01-01 RX ADMIN — CALAMINE AND ZINC OXIDE AND PHENOL 1 APPLICATION(S): 160; 10 LOTION TOPICAL at 13:30

## 2024-01-01 RX ADMIN — MIDODRINE HYDROCHLORIDE 20 MILLIGRAM(S): 10 TABLET ORAL at 17:25

## 2024-01-01 RX ADMIN — INSULIN GLARGINE 8 UNIT(S): 100 INJECTION, SOLUTION SUBCUTANEOUS at 00:08

## 2024-01-01 RX ADMIN — ATORVASTATIN CALCIUM 80 MILLIGRAM(S): 80 TABLET, FILM COATED ORAL at 22:28

## 2024-01-01 RX ADMIN — Medication 325 MILLIGRAM(S): at 06:38

## 2024-01-01 RX ADMIN — Medication 100 MILLIGRAM(S): at 05:56

## 2024-01-01 RX ADMIN — Medication 1 TABLET(S): at 13:20

## 2024-01-01 RX ADMIN — CEFTRIAXONE 100 MILLIGRAM(S): 500 INJECTION, POWDER, FOR SOLUTION INTRAMUSCULAR; INTRAVENOUS at 15:59

## 2024-01-01 RX ADMIN — DROXIDOPA 600 MILLIGRAM(S): 100 CAPSULE ORAL at 17:42

## 2024-01-01 RX ADMIN — APIXABAN 5 MILLIGRAM(S): 2.5 TABLET, FILM COATED ORAL at 06:23

## 2024-01-01 RX ADMIN — Medication 50 MILLILITER(S): at 05:20

## 2024-01-01 RX ADMIN — Medication 1 APPLICATION(S): at 22:39

## 2024-01-01 RX ADMIN — Medication 6 UNIT(S): at 13:24

## 2024-01-01 RX ADMIN — Medication 1 APPLICATION(S): at 06:25

## 2024-01-01 RX ADMIN — Medication 2 MILLIGRAM(S): at 13:11

## 2024-01-01 RX ADMIN — Medication 1 PACKET(S): at 12:14

## 2024-01-01 RX ADMIN — DROXIDOPA 600 MILLIGRAM(S): 200 CAPSULE ORAL at 08:19

## 2024-01-01 RX ADMIN — DROXIDOPA 600 MILLIGRAM(S): 100 CAPSULE ORAL at 19:36

## 2024-01-01 RX ADMIN — ATORVASTATIN CALCIUM 80 MILLIGRAM(S): 80 TABLET, FILM COATED ORAL at 00:23

## 2024-01-01 RX ADMIN — Medication 4 UNIT(S): at 12:15

## 2024-01-01 RX ADMIN — MIDODRINE HYDROCHLORIDE 30 MILLIGRAM(S): 10 TABLET ORAL at 10:01

## 2024-01-01 RX ADMIN — SENNA PLUS 2 TABLET(S): 8.6 TABLET ORAL at 22:15

## 2024-01-01 RX ADMIN — MIDODRINE HYDROCHLORIDE 30 MILLIGRAM(S): 2.5 TABLET ORAL at 22:11

## 2024-01-01 RX ADMIN — CETIRIZINE HYDROCHLORIDE 10 MILLIGRAM(S): 10 TABLET ORAL at 12:21

## 2024-01-01 RX ADMIN — HEPARIN SODIUM 5000 UNIT(S): 5000 INJECTION INTRAVENOUS; SUBCUTANEOUS at 06:05

## 2024-01-01 RX ADMIN — Medication 1 APPLICATION(S): at 12:29

## 2024-01-01 RX ADMIN — Medication 8 UNIT(S): at 18:50

## 2024-01-01 RX ADMIN — Medication 75 MICROGRAM(S): at 06:22

## 2024-01-01 RX ADMIN — Medication 1 APPLICATION(S): at 06:24

## 2024-01-01 RX ADMIN — HEPARIN SODIUM 5000 UNIT(S): 5000 INJECTION INTRAVENOUS; SUBCUTANEOUS at 18:18

## 2024-01-01 RX ADMIN — Medication 200 MILLIGRAM(S): at 10:03

## 2024-01-01 RX ADMIN — Medication 50 MILLILITER(S): at 23:11

## 2024-01-01 RX ADMIN — INSULIN GLARGINE 32 UNIT(S): 100 INJECTION, SOLUTION SUBCUTANEOUS at 22:16

## 2024-01-01 RX ADMIN — VANCOMYCIN HYDROCHLORIDE 250 MILLIGRAM(S): KIT at 21:53

## 2024-01-01 RX ADMIN — PYRIDOSTIGMINE BROMIDE 60 MILLIGRAM(S): 60 SOLUTION ORAL at 13:34

## 2024-01-01 RX ADMIN — Medication 2: at 18:51

## 2024-01-01 RX ADMIN — Medication 1: at 17:37

## 2024-01-01 RX ADMIN — HEPARIN SODIUM 5000 UNIT(S): 5000 INJECTION INTRAVENOUS; SUBCUTANEOUS at 05:09

## 2024-01-01 RX ADMIN — Medication 10 UNIT(S): at 09:21

## 2024-01-01 RX ADMIN — BUMETANIDE 132 MILLIGRAM(S): 0.25 INJECTION INTRAMUSCULAR; INTRAVENOUS at 13:14

## 2024-01-01 RX ADMIN — INSULIN LISPRO 2: 100 INJECTION, SOLUTION SUBCUTANEOUS at 12:20

## 2024-01-01 RX ADMIN — INSULIN GLARGINE 25 UNIT(S): 100 INJECTION, SOLUTION SUBCUTANEOUS at 21:57

## 2024-01-01 RX ADMIN — NYSTATIN CREAM 1 APPLICATION(S): 100000 CREAM TOPICAL at 05:32

## 2024-01-01 RX ADMIN — POLYETHYLENE GLYCOL 3350 17 GRAM(S): 17 POWDER, FOR SOLUTION ORAL at 06:43

## 2024-01-01 RX ADMIN — MIDODRINE HYDROCHLORIDE 15 MILLIGRAM(S): 2.5 TABLET ORAL at 05:30

## 2024-01-01 RX ADMIN — PROPOFOL 24.7 MICROGRAM(S)/KG/MIN: 10 INJECTION, EMULSION INTRAVENOUS at 09:45

## 2024-01-01 RX ADMIN — ONDANSETRON 4 MILLIGRAM(S): 8 TABLET, FILM COATED ORAL at 01:54

## 2024-01-01 RX ADMIN — DROXIDOPA 600 MILLIGRAM(S): 100 CAPSULE ORAL at 12:19

## 2024-01-01 RX ADMIN — MIDODRINE HYDROCHLORIDE 30 MILLIGRAM(S): 2.5 TABLET ORAL at 14:21

## 2024-01-01 RX ADMIN — HEPARIN SODIUM 5000 UNIT(S): 5000 INJECTION INTRAVENOUS; SUBCUTANEOUS at 21:15

## 2024-01-01 RX ADMIN — Medication 2 UNIT(S): at 08:57

## 2024-01-01 RX ADMIN — Medication 5 UNIT(S): at 03:12

## 2024-01-01 RX ADMIN — APIXABAN 5 MILLIGRAM(S): 5 TABLET, FILM COATED ORAL at 19:08

## 2024-01-01 RX ADMIN — Medication 1 APPLICATION(S): at 06:26

## 2024-01-01 RX ADMIN — MUPIROCIN 1 APPLICATION(S): 20 OINTMENT TOPICAL at 05:23

## 2024-01-01 RX ADMIN — Medication 2: at 17:00

## 2024-01-01 RX ADMIN — Medication 75 MICROGRAM(S): at 06:13

## 2024-01-01 RX ADMIN — Medication 2: at 13:22

## 2024-01-01 RX ADMIN — PANTOPRAZOLE SODIUM 40 MILLIGRAM(S): 40 INJECTION, POWDER, FOR SOLUTION INTRAVENOUS at 06:42

## 2024-01-01 RX ADMIN — PYRIDOSTIGMINE BROMIDE 60 MILLIGRAM(S): 60 SOLUTION ORAL at 12:56

## 2024-01-01 RX ADMIN — APIXABAN 5 MILLIGRAM(S): 2.5 TABLET, FILM COATED ORAL at 05:33

## 2024-01-01 RX ADMIN — INSULIN GLARGINE 12 UNIT(S): 100 INJECTION, SOLUTION SUBCUTANEOUS at 22:43

## 2024-01-01 RX ADMIN — DROXIDOPA 400 MILLIGRAM(S): 100 CAPSULE ORAL at 17:23

## 2024-01-01 RX ADMIN — Medication 1: at 12:57

## 2024-01-01 RX ADMIN — PYRIDOSTIGMINE BROMIDE 60 MILLIGRAM(S): 60 SOLUTION ORAL at 17:57

## 2024-01-01 RX ADMIN — Medication 5 UNIT(S): at 17:18

## 2024-01-01 RX ADMIN — MIDODRINE HYDROCHLORIDE 30 MILLIGRAM(S): 2.5 TABLET ORAL at 12:36

## 2024-01-01 RX ADMIN — Medication 15 MILLILITER(S): at 12:34

## 2024-01-01 RX ADMIN — PANTOPRAZOLE SODIUM 40 MILLIGRAM(S): 40 INJECTION, POWDER, FOR SOLUTION INTRAVENOUS at 06:40

## 2024-01-01 RX ADMIN — Medication 5 UNIT(S): at 17:43

## 2024-01-01 RX ADMIN — Medication 2 UNIT(S): at 18:00

## 2024-01-01 RX ADMIN — PYRIDOSTIGMINE BROMIDE 60 MILLIGRAM(S): 60 SOLUTION ORAL at 12:34

## 2024-01-01 RX ADMIN — Medication 81 MILLIGRAM(S): at 12:40

## 2024-01-01 RX ADMIN — MEROPENEM 100 MILLIGRAM(S): 500 INJECTION, POWDER, FOR SOLUTION INTRAVENOUS at 09:22

## 2024-01-01 RX ADMIN — Medication 650 MILLIGRAM(S): at 18:31

## 2024-01-01 RX ADMIN — INSULIN GLARGINE 32 UNIT(S): 100 INJECTION, SOLUTION SUBCUTANEOUS at 21:47

## 2024-01-01 RX ADMIN — MIDODRINE HYDROCHLORIDE 10 MILLIGRAM(S): 2.5 TABLET ORAL at 06:16

## 2024-01-01 RX ADMIN — INSULIN LISPRO 4: 100 INJECTION, SOLUTION SUBCUTANEOUS at 11:44

## 2024-01-01 RX ADMIN — DEXMEDETOMIDINE HYDROCHLORIDE 17.2 MICROGRAM(S)/KG/HR: 4 INJECTION, SOLUTION INTRAVENOUS at 02:13

## 2024-01-01 RX ADMIN — DEXMEDETOMIDINE HYDROCHLORIDE 17.2 MICROGRAM(S)/KG/HR: 4 INJECTION, SOLUTION INTRAVENOUS at 22:17

## 2024-01-01 RX ADMIN — APIXABAN 5 MILLIGRAM(S): 2.5 TABLET, FILM COATED ORAL at 05:12

## 2024-01-01 RX ADMIN — ERYTHROPOIETIN 10000 UNIT(S): 10000 INJECTION, SOLUTION INTRAVENOUS; SUBCUTANEOUS at 18:27

## 2024-01-01 RX ADMIN — CALAMINE AND ZINC OXIDE AND PHENOL 1 APPLICATION(S): 160; 10 LOTION TOPICAL at 22:17

## 2024-01-01 RX ADMIN — Medication 325 MILLIGRAM(S): at 08:10

## 2024-01-01 RX ADMIN — HEPARIN SODIUM 5000 UNIT(S): 5000 INJECTION INTRAVENOUS; SUBCUTANEOUS at 18:21

## 2024-01-01 RX ADMIN — Medication 60 MILLIGRAM(S): at 05:41

## 2024-01-01 RX ADMIN — Medication 166.67 MILLIGRAM(S): at 18:25

## 2024-01-01 RX ADMIN — APIXABAN 5 MILLIGRAM(S): 2.5 TABLET, FILM COATED ORAL at 06:13

## 2024-01-01 RX ADMIN — Medication 325 MILLIGRAM(S): at 18:22

## 2024-01-01 RX ADMIN — MIDODRINE HYDROCHLORIDE 10 MILLIGRAM(S): 2.5 TABLET ORAL at 13:11

## 2024-01-01 RX ADMIN — Medication 1 APPLICATION(S): at 12:38

## 2024-01-01 RX ADMIN — Medication 60 MILLIGRAM(S): at 18:44

## 2024-01-01 RX ADMIN — Medication 2: at 08:56

## 2024-01-01 RX ADMIN — ATORVASTATIN CALCIUM 80 MILLIGRAM(S): 80 TABLET, FILM COATED ORAL at 21:15

## 2024-01-01 RX ADMIN — MIDODRINE HYDROCHLORIDE 10 MILLIGRAM(S): 2.5 TABLET ORAL at 21:47

## 2024-01-01 RX ADMIN — Medication 325 MILLIGRAM(S): at 17:24

## 2024-01-01 RX ADMIN — FENTANYL CITRATE 25 MICROGRAM(S): 50 INJECTION, SOLUTION INTRAMUSCULAR; INTRAVENOUS at 00:22

## 2024-01-01 RX ADMIN — ONDANSETRON 4 MILLIGRAM(S): 8 TABLET, FILM COATED ORAL at 07:01

## 2024-01-01 RX ADMIN — Medication 1 PACKET(S): at 10:31

## 2024-01-01 RX ADMIN — FLUDROCORTISONE ACETATE 0.1 MILLIGRAM(S): 0.1 TABLET ORAL at 06:04

## 2024-01-01 RX ADMIN — Medication 4: at 08:11

## 2024-01-01 RX ADMIN — DEXMEDETOMIDINE HYDROCHLORIDE 17.2 MICROGRAM(S)/KG/HR: 4 INJECTION, SOLUTION INTRAVENOUS at 00:35

## 2024-01-01 RX ADMIN — DROXIDOPA 600 MILLIGRAM(S): 100 CAPSULE ORAL at 17:41

## 2024-01-01 RX ADMIN — Medication 2: at 18:42

## 2024-01-01 RX ADMIN — Medication 25 GRAM(S): at 21:57

## 2024-01-01 RX ADMIN — ATORVASTATIN CALCIUM 80 MILLIGRAM(S): 80 TABLET, FILM COATED ORAL at 21:14

## 2024-01-01 RX ADMIN — BUMETANIDE 5 MG/HR: 0.25 INJECTION INTRAMUSCULAR; INTRAVENOUS at 21:23

## 2024-01-01 RX ADMIN — Medication 5 UNIT(S): at 17:27

## 2024-01-01 RX ADMIN — PANTOPRAZOLE SODIUM 40 MILLIGRAM(S): 20 TABLET, DELAYED RELEASE ORAL at 06:26

## 2024-01-01 RX ADMIN — DEXMEDETOMIDINE HYDROCHLORIDE 17.2 MICROGRAM(S)/KG/HR: 4 INJECTION, SOLUTION INTRAVENOUS at 02:52

## 2024-01-01 RX ADMIN — SODIUM CHLORIDE 75 MILLILITER(S): 9 INJECTION INTRAMUSCULAR; INTRAVENOUS; SUBCUTANEOUS at 11:04

## 2024-01-01 RX ADMIN — Medication 0.1 MILLIGRAM(S): at 05:06

## 2024-01-01 RX ADMIN — HEPARIN SODIUM 5000 UNIT(S): 5000 INJECTION INTRAVENOUS; SUBCUTANEOUS at 22:13

## 2024-01-01 RX ADMIN — Medication 81 MILLIGRAM(S): at 13:01

## 2024-01-01 RX ADMIN — HEPARIN SODIUM 5000 UNIT(S): 5000 INJECTION INTRAVENOUS; SUBCUTANEOUS at 22:33

## 2024-01-01 RX ADMIN — Medication 325 MILLIGRAM(S): at 17:54

## 2024-01-01 RX ADMIN — INSULIN GLARGINE 7 UNIT(S): 100 INJECTION, SOLUTION SUBCUTANEOUS at 00:01

## 2024-01-01 RX ADMIN — DROXIDOPA 600 MILLIGRAM(S): 200 CAPSULE ORAL at 10:01

## 2024-01-01 RX ADMIN — FLUDROCORTISONE ACETATE 0.2 MILLIGRAM(S): 0.1 TABLET ORAL at 05:31

## 2024-01-01 RX ADMIN — MIDODRINE HYDROCHLORIDE 15 MILLIGRAM(S): 2.5 TABLET ORAL at 06:10

## 2024-01-01 RX ADMIN — Medication 325 MILLIGRAM(S): at 05:18

## 2024-01-01 RX ADMIN — PYRIDOSTIGMINE BROMIDE 60 MILLIGRAM(S): 60 SOLUTION ORAL at 14:54

## 2024-01-01 RX ADMIN — Medication 3: at 17:22

## 2024-01-01 RX ADMIN — Medication 2: at 12:08

## 2024-01-01 RX ADMIN — ATORVASTATIN CALCIUM 80 MILLIGRAM(S): 80 TABLET, FILM COATED ORAL at 21:45

## 2024-01-01 RX ADMIN — Medication 3 UNIT(S): at 18:04

## 2024-01-01 RX ADMIN — HEPARIN SODIUM 5000 UNIT(S): 5000 INJECTION INTRAVENOUS; SUBCUTANEOUS at 13:10

## 2024-01-01 RX ADMIN — INSULIN GLARGINE 10 UNIT(S): 100 INJECTION, SOLUTION SUBCUTANEOUS at 09:20

## 2024-01-01 RX ADMIN — MIDODRINE HYDROCHLORIDE 15 MILLIGRAM(S): 2.5 TABLET ORAL at 12:55

## 2024-01-01 RX ADMIN — Medication 8 UNIT(S): at 12:54

## 2024-01-01 RX ADMIN — CALAMINE AND ZINC OXIDE AND PHENOL 1 APPLICATION(S): 160; 10 LOTION TOPICAL at 05:08

## 2024-01-01 RX ADMIN — Medication 1 APPLICATION(S): at 12:02

## 2024-01-01 RX ADMIN — Medication 100 GRAM(S): at 11:13

## 2024-01-01 RX ADMIN — ATORVASTATIN CALCIUM 80 MILLIGRAM(S): 80 TABLET, FILM COATED ORAL at 21:07

## 2024-01-01 RX ADMIN — Medication 81 MILLIGRAM(S): at 12:28

## 2024-01-01 RX ADMIN — MIDODRINE HYDROCHLORIDE 15 MILLIGRAM(S): 2.5 TABLET ORAL at 05:33

## 2024-01-01 RX ADMIN — Medication 81 MILLIGRAM(S): at 13:05

## 2024-01-01 RX ADMIN — Medication 500000 UNIT(S): at 18:03

## 2024-01-01 RX ADMIN — Medication 1: at 11:34

## 2024-01-01 RX ADMIN — NYSTATIN CREAM 1 APPLICATION(S): 100000 CREAM TOPICAL at 22:45

## 2024-01-01 RX ADMIN — Medication 1 APPLICATION(S): at 06:44

## 2024-01-01 RX ADMIN — ONDANSETRON 4 MILLIGRAM(S): 8 TABLET, FILM COATED ORAL at 10:32

## 2024-01-01 RX ADMIN — FLUDROCORTISONE ACETATE 0.2 MILLIGRAM(S): 0.1 TABLET ORAL at 06:13

## 2024-01-01 RX ADMIN — VASOPRESSIN 3 UNIT(S)/MIN: 20 INJECTION INTRAVENOUS at 21:17

## 2024-01-01 RX ADMIN — MEROPENEM 100 MILLIGRAM(S): 500 INJECTION, POWDER, FOR SOLUTION INTRAVENOUS at 08:56

## 2024-01-01 RX ADMIN — Medication 1 APPLICATION(S): at 10:23

## 2024-01-01 RX ADMIN — MIDODRINE HYDROCHLORIDE 15 MILLIGRAM(S): 2.5 TABLET ORAL at 12:52

## 2024-01-01 RX ADMIN — Medication 81 MILLIGRAM(S): at 14:06

## 2024-01-01 RX ADMIN — HEPARIN SODIUM 5000 UNIT(S): 5000 INJECTION INTRAVENOUS; SUBCUTANEOUS at 13:29

## 2024-01-01 RX ADMIN — Medication 8 UNIT(S): at 07:48

## 2024-01-01 RX ADMIN — ERYTHROPOIETIN 10000 UNIT(S): 10000 INJECTION, SOLUTION INTRAVENOUS; SUBCUTANEOUS at 13:35

## 2024-01-01 RX ADMIN — Medication 1: at 22:51

## 2024-01-01 RX ADMIN — INSULIN GLARGINE 10 UNIT(S): 100 INJECTION, SOLUTION SUBCUTANEOUS at 23:49

## 2024-01-01 RX ADMIN — SODIUM BICARBONATE 100 MEQ/KG/HR: 650 TABLET ORAL at 23:43

## 2024-01-01 RX ADMIN — HEPARIN SODIUM 5000 UNIT(S): 5000 INJECTION INTRAVENOUS; SUBCUTANEOUS at 17:02

## 2024-01-01 RX ADMIN — Medication 8 UNIT(S): at 18:03

## 2024-01-01 RX ADMIN — Medication 1 APPLICATION(S): at 05:57

## 2024-01-01 RX ADMIN — MIDODRINE HYDROCHLORIDE 20 MILLIGRAM(S): 2.5 TABLET ORAL at 21:14

## 2024-01-01 RX ADMIN — Medication 75 MICROGRAM(S): at 06:00

## 2024-01-01 RX ADMIN — PANTOPRAZOLE SODIUM 40 MILLIGRAM(S): 20 TABLET, DELAYED RELEASE ORAL at 05:30

## 2024-01-01 RX ADMIN — Medication 4 UNIT(S): at 18:22

## 2024-01-01 RX ADMIN — Medication 1 APPLICATION(S): at 13:24

## 2024-01-01 RX ADMIN — PANTOPRAZOLE SODIUM 40 MILLIGRAM(S): 20 TABLET, DELAYED RELEASE ORAL at 17:53

## 2024-01-01 RX ADMIN — DROXIDOPA 600 MILLIGRAM(S): 200 CAPSULE ORAL at 12:35

## 2024-01-01 RX ADMIN — Medication 1: at 17:36

## 2024-01-01 RX ADMIN — Medication 6 UNIT(S): at 12:10

## 2024-01-01 RX ADMIN — MIDODRINE HYDROCHLORIDE 15 MILLIGRAM(S): 2.5 TABLET ORAL at 06:34

## 2024-01-01 RX ADMIN — DROXIDOPA 600 MILLIGRAM(S): 100 CAPSULE ORAL at 17:16

## 2024-01-01 RX ADMIN — PYRIDOSTIGMINE BROMIDE 60 MILLIGRAM(S): 60 SOLUTION ORAL at 17:21

## 2024-01-01 RX ADMIN — ATORVASTATIN CALCIUM 80 MILLIGRAM(S): 80 TABLET, FILM COATED ORAL at 22:27

## 2024-01-01 RX ADMIN — Medication 325 MILLIGRAM(S): at 06:06

## 2024-01-01 RX ADMIN — HEPARIN SODIUM 5000 UNIT(S): 5000 INJECTION INTRAVENOUS; SUBCUTANEOUS at 06:21

## 2024-01-01 RX ADMIN — NYSTATIN CREAM 1 APPLICATION(S): 100000 CREAM TOPICAL at 22:21

## 2024-01-01 RX ADMIN — Medication 1 APPLICATION(S): at 22:55

## 2024-01-01 RX ADMIN — SODIUM BICARBONATE 50 MILLIEQUIVALENT(S): 650 TABLET ORAL at 06:17

## 2024-01-01 RX ADMIN — ATORVASTATIN CALCIUM 80 MILLIGRAM(S): 80 TABLET, FILM COATED ORAL at 21:24

## 2024-01-01 RX ADMIN — PYRIDOSTIGMINE BROMIDE 30 MILLIGRAM(S): 60 SOLUTION ORAL at 06:11

## 2024-01-01 RX ADMIN — Medication 325 MILLIGRAM(S): at 17:08

## 2024-01-01 RX ADMIN — Medication 8 UNIT(S): at 12:48

## 2024-01-01 RX ADMIN — HEPARIN SODIUM 5000 UNIT(S): 5000 INJECTION INTRAVENOUS; SUBCUTANEOUS at 17:22

## 2024-01-01 RX ADMIN — HEPARIN SODIUM 5000 UNIT(S): 5000 INJECTION INTRAVENOUS; SUBCUTANEOUS at 13:22

## 2024-01-01 RX ADMIN — MUPIROCIN 1 APPLICATION(S): 20 OINTMENT TOPICAL at 21:24

## 2024-01-01 RX ADMIN — FENTANYL CITRATE 25 MICROGRAM(S): 50 INJECTION, SOLUTION INTRAMUSCULAR; INTRAVENOUS at 17:50

## 2024-01-01 RX ADMIN — APIXABAN 5 MILLIGRAM(S): 2.5 TABLET, FILM COATED ORAL at 17:37

## 2024-01-01 RX ADMIN — HEPARIN SODIUM 1800 UNIT(S)/HR: 5000 INJECTION INTRAVENOUS; SUBCUTANEOUS at 06:52

## 2024-01-01 RX ADMIN — CALAMINE AND ZINC OXIDE AND PHENOL 1 APPLICATION(S): 160; 10 LOTION TOPICAL at 21:52

## 2024-01-01 RX ADMIN — Medication 325 MILLIGRAM(S): at 18:06

## 2024-01-01 RX ADMIN — INSULIN GLARGINE 8 UNIT(S): 100 INJECTION, SOLUTION SUBCUTANEOUS at 22:42

## 2024-01-01 RX ADMIN — FAMOTIDINE 104 MILLIGRAM(S): 10 INJECTION INTRAVENOUS at 13:06

## 2024-01-01 RX ADMIN — INSULIN GLARGINE 14 UNIT(S): 100 INJECTION, SOLUTION SUBCUTANEOUS at 21:47

## 2024-01-01 RX ADMIN — FLUDROCORTISONE ACETATE 0.2 MILLIGRAM(S): 0.1 TABLET ORAL at 17:38

## 2024-01-01 RX ADMIN — DROXIDOPA 600 MILLIGRAM(S): 200 CAPSULE ORAL at 05:18

## 2024-01-01 RX ADMIN — Medication 15 MILLILITER(S): at 11:23

## 2024-01-01 RX ADMIN — APIXABAN 5 MILLIGRAM(S): 2.5 TABLET, FILM COATED ORAL at 05:26

## 2024-01-01 RX ADMIN — Medication 10 MILLIGRAM(S): at 12:40

## 2024-01-01 RX ADMIN — ATORVASTATIN CALCIUM 80 MILLIGRAM(S): 20 TABLET, FILM COATED ORAL at 21:52

## 2024-01-01 RX ADMIN — MIDODRINE HYDROCHLORIDE 30 MILLIGRAM(S): 10 TABLET ORAL at 12:47

## 2024-01-01 RX ADMIN — ATORVASTATIN CALCIUM 80 MILLIGRAM(S): 80 TABLET, FILM COATED ORAL at 21:22

## 2024-01-01 RX ADMIN — Medication 9 UNIT(S): at 16:29

## 2024-01-01 RX ADMIN — Medication 50 MILLIGRAM(S): at 23:37

## 2024-01-01 RX ADMIN — Medication 325 MILLIGRAM(S): at 05:36

## 2024-01-01 RX ADMIN — Medication 4: at 17:17

## 2024-01-01 RX ADMIN — Medication 1: at 18:22

## 2024-01-01 RX ADMIN — FLUDROCORTISONE ACETATE 0.2 MILLIGRAM(S): 0.1 TABLET ORAL at 21:42

## 2024-01-01 RX ADMIN — DEXMEDETOMIDINE HYDROCHLORIDE 17.2 MICROGRAM(S)/KG/HR: 4 INJECTION, SOLUTION INTRAVENOUS at 07:08

## 2024-01-01 RX ADMIN — PYRIDOSTIGMINE BROMIDE 60 MILLIGRAM(S): 60 TABLET ORAL at 17:00

## 2024-01-01 RX ADMIN — CHLORHEXIDINE GLUCONATE 1 APPLICATION(S): 213 SOLUTION TOPICAL at 05:28

## 2024-01-01 RX ADMIN — Medication 500000 UNIT(S): at 23:56

## 2024-01-01 RX ADMIN — Medication 500000 UNIT(S): at 06:16

## 2024-01-01 RX ADMIN — SODIUM CHLORIDE 100 MILLILITER(S): 9 INJECTION, SOLUTION INTRAVENOUS at 11:24

## 2024-01-01 RX ADMIN — HEPARIN SODIUM 5000 UNIT(S): 5000 INJECTION INTRAVENOUS; SUBCUTANEOUS at 16:09

## 2024-01-01 RX ADMIN — Medication 8 UNIT(S): at 17:26

## 2024-01-01 RX ADMIN — Medication 15 MILLILITER(S): at 05:07

## 2024-01-01 RX ADMIN — ATORVASTATIN CALCIUM 80 MILLIGRAM(S): 80 TABLET, FILM COATED ORAL at 22:46

## 2024-01-01 RX ADMIN — Medication 8 UNIT(S): at 17:53

## 2024-01-01 RX ADMIN — Medication 4 UNIT(S): at 18:21

## 2024-01-01 RX ADMIN — CEFEPIME 100 MILLIGRAM(S): 1 INJECTION, POWDER, FOR SOLUTION INTRAMUSCULAR; INTRAVENOUS at 00:03

## 2024-01-01 RX ADMIN — Medication 1: at 09:02

## 2024-01-01 RX ADMIN — HEPARIN SODIUM 5000 UNIT(S): 5000 INJECTION INTRAVENOUS; SUBCUTANEOUS at 22:45

## 2024-01-01 RX ADMIN — Medication 1: at 13:26

## 2024-01-01 RX ADMIN — Medication 500000 UNIT(S): at 18:21

## 2024-01-01 RX ADMIN — PSYLLIUM HUSK 1 PACKET(S): 3.4 POWDER, FOR SOLUTION ORAL at 14:25

## 2024-01-01 RX ADMIN — NOREPINEPHRINE BITARTRATE 42.5 MICROGRAM(S)/KG/MIN: 1 INJECTION INTRAVENOUS at 05:38

## 2024-01-01 RX ADMIN — MIDODRINE HYDROCHLORIDE 30 MILLIGRAM(S): 10 TABLET ORAL at 14:24

## 2024-01-01 RX ADMIN — SENNA PLUS 2 TABLET(S): 8.6 TABLET ORAL at 22:14

## 2024-01-01 RX ADMIN — APIXABAN 5 MILLIGRAM(S): 2.5 TABLET, FILM COATED ORAL at 17:03

## 2024-01-01 RX ADMIN — MIDODRINE HYDROCHLORIDE 5 MILLIGRAM(S): 2.5 TABLET ORAL at 07:03

## 2024-01-01 RX ADMIN — MIDODRINE HYDROCHLORIDE 20 MILLIGRAM(S): 2.5 TABLET ORAL at 20:30

## 2024-01-01 RX ADMIN — INSULIN GLARGINE 24 UNIT(S): 100 INJECTION, SOLUTION SUBCUTANEOUS at 21:47

## 2024-01-01 RX ADMIN — Medication 2: at 08:49

## 2024-01-01 RX ADMIN — Medication 75 MICROGRAM(S): at 05:53

## 2024-01-01 RX ADMIN — DROXIDOPA 400 MILLIGRAM(S): 100 CAPSULE ORAL at 21:46

## 2024-01-01 RX ADMIN — Medication 75 MICROGRAM(S): at 06:27

## 2024-01-01 RX ADMIN — CALAMINE AND ZINC OXIDE AND PHENOL 1 APPLICATION(S): 160; 10 LOTION TOPICAL at 06:26

## 2024-01-01 RX ADMIN — Medication 6 UNIT(S)/MIN: at 12:31

## 2024-01-01 RX ADMIN — DROXIDOPA 600 MILLIGRAM(S): 200 CAPSULE ORAL at 12:11

## 2024-01-01 RX ADMIN — DROXIDOPA 600 MILLIGRAM(S): 100 CAPSULE ORAL at 17:30

## 2024-01-01 RX ADMIN — Medication 1: at 18:16

## 2024-01-01 RX ADMIN — ATORVASTATIN CALCIUM 80 MILLIGRAM(S): 80 TABLET, FILM COATED ORAL at 21:48

## 2024-01-01 RX ADMIN — DROXIDOPA 600 MILLIGRAM(S): 100 CAPSULE ORAL at 06:22

## 2024-01-01 RX ADMIN — Medication 250 MILLIGRAM(S): at 00:31

## 2024-01-01 RX ADMIN — SODIUM CHLORIDE 75 MILLILITER(S): 9 INJECTION, SOLUTION INTRAVENOUS at 05:39

## 2024-01-01 RX ADMIN — CALAMINE AND ZINC OXIDE AND PHENOL 1 APPLICATION(S): 160; 10 LOTION TOPICAL at 13:20

## 2024-01-01 RX ADMIN — DROXIDOPA 200 MILLIGRAM(S): 100 CAPSULE ORAL at 05:30

## 2024-01-01 RX ADMIN — Medication 500000 UNIT(S): at 18:22

## 2024-01-01 RX ADMIN — SODIUM CHLORIDE 55 MILLILITER(S): 9 INJECTION INTRAMUSCULAR; INTRAVENOUS; SUBCUTANEOUS at 11:13

## 2024-01-01 RX ADMIN — HEPARIN SODIUM 5000 UNIT(S): 5000 INJECTION INTRAVENOUS; SUBCUTANEOUS at 05:17

## 2024-01-01 RX ADMIN — Medication 3 UNIT(S): at 18:21

## 2024-01-01 RX ADMIN — INSULIN GLARGINE 10 UNIT(S): 100 INJECTION, SOLUTION SUBCUTANEOUS at 22:12

## 2024-01-01 RX ADMIN — Medication 10 UNIT(S): at 09:35

## 2024-01-01 RX ADMIN — PYRIDOSTIGMINE BROMIDE 60 MILLIGRAM(S): 60 TABLET ORAL at 12:11

## 2024-01-01 RX ADMIN — HEPARIN SODIUM 2200 UNIT(S)/HR: 5000 INJECTION INTRAVENOUS; SUBCUTANEOUS at 21:39

## 2024-01-01 RX ADMIN — INSULIN GLARGINE 26 UNIT(S): 100 INJECTION, SOLUTION SUBCUTANEOUS at 22:57

## 2024-01-01 RX ADMIN — Medication 200 MILLIGRAM(S): at 20:38

## 2024-01-01 RX ADMIN — FLUDROCORTISONE ACETATE 0.1 MILLIGRAM(S): 0.1 TABLET ORAL at 05:13

## 2024-01-01 RX ADMIN — MIDODRINE HYDROCHLORIDE 30 MILLIGRAM(S): 10 TABLET ORAL at 12:34

## 2024-01-01 RX ADMIN — INSULIN GLARGINE 14 UNIT(S): 100 INJECTION, SOLUTION SUBCUTANEOUS at 23:11

## 2024-01-01 RX ADMIN — APIXABAN 5 MILLIGRAM(S): 2.5 TABLET, FILM COATED ORAL at 17:26

## 2024-01-01 RX ADMIN — MIDODRINE HYDROCHLORIDE 30 MILLIGRAM(S): 2.5 TABLET ORAL at 12:34

## 2024-01-01 RX ADMIN — Medication 20 MILLIEQUIVALENT(S): at 17:08

## 2024-01-01 RX ADMIN — Medication 1 APPLICATION(S): at 11:59

## 2024-01-01 RX ADMIN — NYSTATIN CREAM 1 APPLICATION(S): 100000 CREAM TOPICAL at 17:06

## 2024-01-01 RX ADMIN — HEPARIN SODIUM 5000 UNIT(S): 5000 INJECTION INTRAVENOUS; SUBCUTANEOUS at 22:11

## 2024-01-01 RX ADMIN — Medication 500 MILLIGRAM(S): at 12:07

## 2024-01-01 RX ADMIN — CALAMINE AND ZINC OXIDE AND PHENOL 1 APPLICATION(S): 160; 10 LOTION TOPICAL at 21:43

## 2024-01-01 RX ADMIN — DROXIDOPA 400 MILLIGRAM(S): 100 CAPSULE ORAL at 11:39

## 2024-01-01 RX ADMIN — Medication 1 APPLICATION(S): at 06:11

## 2024-01-01 RX ADMIN — Medication 500 MILLIGRAM(S): at 12:10

## 2024-01-01 RX ADMIN — SODIUM CHLORIDE 75 MILLILITER(S): 9 INJECTION, SOLUTION INTRAVENOUS at 03:16

## 2024-01-01 RX ADMIN — Medication 3: at 09:59

## 2024-01-01 RX ADMIN — Medication 1: at 18:14

## 2024-01-01 RX ADMIN — Medication 7 UNIT(S): at 08:12

## 2024-01-01 RX ADMIN — MUPIROCIN 1 APPLICATION(S): 20 OINTMENT TOPICAL at 14:03

## 2024-01-01 RX ADMIN — ATORVASTATIN CALCIUM 80 MILLIGRAM(S): 80 TABLET, FILM COATED ORAL at 21:43

## 2024-01-01 RX ADMIN — Medication 81 MILLIGRAM(S): at 13:40

## 2024-01-01 RX ADMIN — MIDODRINE HYDROCHLORIDE 15 MILLIGRAM(S): 2.5 TABLET ORAL at 12:54

## 2024-01-01 RX ADMIN — MUPIROCIN 1 APPLICATION(S): 20 OINTMENT TOPICAL at 05:12

## 2024-01-01 RX ADMIN — DROXIDOPA 200 MILLIGRAM(S): 100 CAPSULE ORAL at 06:23

## 2024-01-01 RX ADMIN — HEPARIN SODIUM 5000 UNIT(S): 5000 INJECTION INTRAVENOUS; SUBCUTANEOUS at 05:30

## 2024-01-01 RX ADMIN — Medication 1: at 02:36

## 2024-01-01 RX ADMIN — MIDODRINE HYDROCHLORIDE 15 MILLIGRAM(S): 2.5 TABLET ORAL at 11:39

## 2024-01-01 RX ADMIN — Medication 1 APPLICATION(S): at 11:43

## 2024-01-01 RX ADMIN — PANTOPRAZOLE SODIUM 40 MILLIGRAM(S): 40 INJECTION, POWDER, FOR SOLUTION INTRAVENOUS at 12:39

## 2024-01-01 RX ADMIN — Medication 3: at 13:55

## 2024-01-01 RX ADMIN — PYRIDOSTIGMINE BROMIDE 30 MILLIGRAM(S): 60 SOLUTION ORAL at 12:37

## 2024-01-01 RX ADMIN — Medication 81 MILLIGRAM(S): at 12:10

## 2024-01-01 RX ADMIN — PANTOPRAZOLE SODIUM 40 MILLIGRAM(S): 20 TABLET, DELAYED RELEASE ORAL at 17:16

## 2024-01-01 RX ADMIN — NOREPINEPHRINE BITARTRATE 12.9 MICROGRAM(S)/KG/MIN: 1 INJECTION INTRAVENOUS at 19:37

## 2024-01-01 RX ADMIN — HEPARIN SODIUM 5000 UNIT(S): 5000 INJECTION INTRAVENOUS; SUBCUTANEOUS at 21:48

## 2024-01-01 RX ADMIN — CALAMINE AND ZINC OXIDE AND PHENOL 1 APPLICATION(S): 160; 10 LOTION TOPICAL at 21:16

## 2024-01-01 RX ADMIN — Medication 1 TABLET(S): at 14:06

## 2024-01-01 RX ADMIN — MEROPENEM 100 MILLIGRAM(S): 500 INJECTION, POWDER, FOR SOLUTION INTRAVENOUS at 21:48

## 2024-01-01 RX ADMIN — Medication 166.67 MILLIGRAM(S): at 06:33

## 2024-01-01 RX ADMIN — MUPIROCIN 1 APPLICATION(S): 20 OINTMENT TOPICAL at 00:00

## 2024-01-01 RX ADMIN — DROXIDOPA 600 MILLIGRAM(S): 200 CAPSULE ORAL at 08:13

## 2024-01-01 RX ADMIN — Medication 75 MICROGRAM(S): at 05:23

## 2024-01-01 RX ADMIN — Medication 81 MILLIGRAM(S): at 12:42

## 2024-01-01 RX ADMIN — MIDODRINE HYDROCHLORIDE 20 MILLIGRAM(S): 2.5 TABLET ORAL at 20:16

## 2024-01-01 RX ADMIN — BUMETANIDE 2.5 MG/HR: 0.25 INJECTION INTRAMUSCULAR; INTRAVENOUS at 13:36

## 2024-01-01 RX ADMIN — Medication 1 TABLET(S): at 11:39

## 2024-01-01 RX ADMIN — Medication 325 MILLIGRAM(S): at 05:58

## 2024-01-01 RX ADMIN — HEPARIN SODIUM 5000 UNIT(S): 5000 INJECTION INTRAVENOUS; SUBCUTANEOUS at 14:02

## 2024-01-01 RX ADMIN — Medication 1 TABLET(S): at 13:01

## 2024-01-01 RX ADMIN — Medication 25 MILLIGRAM(S): at 12:34

## 2024-01-01 RX ADMIN — INSULIN HUMAN 3 UNIT(S): 100 INJECTION, SOLUTION SUBCUTANEOUS at 08:00

## 2024-01-01 RX ADMIN — HEPARIN SODIUM 5000 UNIT(S): 5000 INJECTION INTRAVENOUS; SUBCUTANEOUS at 05:00

## 2024-01-01 RX ADMIN — INSULIN LISPRO 5 UNIT(S): 100 INJECTION, SOLUTION SUBCUTANEOUS at 12:12

## 2024-01-01 RX ADMIN — Medication 5 MILLIGRAM(S): at 12:12

## 2024-01-01 RX ADMIN — Medication 40 MILLIEQUIVALENT(S): at 05:41

## 2024-01-01 RX ADMIN — NYSTATIN CREAM 1 APPLICATION(S): 100000 CREAM TOPICAL at 18:21

## 2024-01-01 RX ADMIN — Medication 75 MICROGRAM(S): at 05:14

## 2024-01-01 RX ADMIN — ONDANSETRON 4 MILLIGRAM(S): 8 TABLET, FILM COATED ORAL at 10:10

## 2024-01-01 RX ADMIN — Medication 5 UNIT(S): at 08:50

## 2024-01-01 RX ADMIN — Medication 1 APPLICATION(S): at 12:28

## 2024-01-01 RX ADMIN — Medication 1 TABLET(S): at 12:27

## 2024-01-01 RX ADMIN — Medication 3 UNIT(S)/HR: at 21:32

## 2024-01-01 RX ADMIN — FENTANYL CITRATE 50 MICROGRAM(S): 50 INJECTION, SOLUTION INTRAMUSCULAR; INTRAVENOUS at 04:12

## 2024-01-01 RX ADMIN — Medication 1 APPLICATION(S): at 12:16

## 2024-01-01 RX ADMIN — HEPARIN SODIUM 5000 UNIT(S): 5000 INJECTION INTRAVENOUS; SUBCUTANEOUS at 05:29

## 2024-01-01 RX ADMIN — Medication 81 MILLIGRAM(S): at 12:05

## 2024-01-01 RX ADMIN — HEPARIN SODIUM 5000 UNIT(S): 5000 INJECTION INTRAVENOUS; SUBCUTANEOUS at 05:02

## 2024-01-01 RX ADMIN — HEPARIN SODIUM 5000 UNIT(S): 5000 INJECTION INTRAVENOUS; SUBCUTANEOUS at 05:43

## 2024-01-01 RX ADMIN — Medication 8 UNIT(S): at 10:22

## 2024-01-01 RX ADMIN — NOREPINEPHRINE BITARTRATE 42.5 MICROGRAM(S)/KG/MIN: 1 INJECTION INTRAVENOUS at 00:11

## 2024-01-01 RX ADMIN — DROXIDOPA 400 MILLIGRAM(S): 100 CAPSULE ORAL at 17:03

## 2024-01-01 RX ADMIN — Medication 2: at 08:01

## 2024-01-01 RX ADMIN — Medication 80 MILLIGRAM(S): at 17:28

## 2024-01-01 RX ADMIN — MUPIROCIN 1 APPLICATION(S): 20 OINTMENT TOPICAL at 17:12

## 2024-01-01 RX ADMIN — Medication 1 APPLICATION(S): at 17:43

## 2024-01-01 RX ADMIN — Medication 1: at 18:20

## 2024-01-01 RX ADMIN — Medication 1 TABLET(S): at 11:22

## 2024-01-01 RX ADMIN — Medication 8 UNIT(S): at 12:41

## 2024-01-01 RX ADMIN — DROXIDOPA 200 MILLIGRAM(S): 100 CAPSULE ORAL at 11:24

## 2024-01-01 RX ADMIN — MIDODRINE HYDROCHLORIDE 30 MILLIGRAM(S): 10 TABLET ORAL at 06:27

## 2024-01-01 RX ADMIN — NYSTATIN CREAM 1 APPLICATION(S): 100000 CREAM TOPICAL at 17:16

## 2024-01-01 RX ADMIN — Medication 1 TABLET(S): at 12:52

## 2024-01-01 RX ADMIN — MIDODRINE HYDROCHLORIDE 20 MILLIGRAM(S): 10 TABLET ORAL at 17:24

## 2024-01-01 RX ADMIN — PSYLLIUM HUSK 1 PACKET(S): 3.4 POWDER, FOR SOLUTION ORAL at 09:12

## 2024-01-01 RX ADMIN — FLUDROCORTISONE ACETATE 0.1 MILLIGRAM(S): 0.1 TABLET ORAL at 06:05

## 2024-01-01 RX ADMIN — Medication 325 MILLIGRAM(S): at 05:45

## 2024-01-01 RX ADMIN — Medication 10 UNIT(S): at 13:22

## 2024-01-01 RX ADMIN — INSULIN GLARGINE 14 UNIT(S): 100 INJECTION, SOLUTION SUBCUTANEOUS at 21:53

## 2024-01-01 RX ADMIN — PANTOPRAZOLE SODIUM 40 MILLIGRAM(S): 20 TABLET, DELAYED RELEASE ORAL at 06:42

## 2024-01-01 RX ADMIN — INSULIN GLARGINE 12 UNIT(S): 100 INJECTION, SOLUTION SUBCUTANEOUS at 21:26

## 2024-01-01 RX ADMIN — Medication 4 UNIT(S): at 09:09

## 2024-01-01 RX ADMIN — Medication 2 UNIT(S): at 18:25

## 2024-01-01 RX ADMIN — Medication 1 APPLICATION(S): at 06:41

## 2024-01-01 RX ADMIN — DROXIDOPA 600 MILLIGRAM(S): 100 CAPSULE ORAL at 06:13

## 2024-01-01 RX ADMIN — CHLORHEXIDINE GLUCONATE 15 MILLILITER(S): 213 SOLUTION TOPICAL at 07:33

## 2024-01-01 RX ADMIN — PROPOFOL 24.7 MICROGRAM(S)/KG/MIN: 10 INJECTION, EMULSION INTRAVENOUS at 06:18

## 2024-01-01 RX ADMIN — Medication 2 UNIT(S): at 13:02

## 2024-01-01 RX ADMIN — Medication 4 UNIT(S): at 17:13

## 2024-01-01 RX ADMIN — Medication 8 UNIT(S): at 17:08

## 2024-01-01 RX ADMIN — DROXIDOPA 600 MILLIGRAM(S): 100 CAPSULE ORAL at 18:06

## 2024-01-01 RX ADMIN — Medication 8 UNIT(S): at 13:27

## 2024-01-01 RX ADMIN — DEXTROSE MONOHYDRATE AND SODIUM CHLORIDE 1000 MILLILITER(S): 5; .3 INJECTION, SOLUTION INTRAVENOUS at 06:18

## 2024-01-01 RX ADMIN — Medication 5 UNIT(S): at 13:03

## 2024-01-01 RX ADMIN — BUMETANIDE 2 MILLIGRAM(S): 0.25 INJECTION INTRAMUSCULAR; INTRAVENOUS at 11:24

## 2024-01-01 RX ADMIN — Medication 8 UNIT(S): at 09:36

## 2024-01-01 RX ADMIN — INSULIN LISPRO 1: 100 INJECTION, SOLUTION SUBCUTANEOUS at 17:57

## 2024-01-01 RX ADMIN — Medication 15 MILLILITER(S): at 11:44

## 2024-01-01 RX ADMIN — Medication 325 MILLIGRAM(S): at 18:17

## 2024-01-01 RX ADMIN — Medication 81 MILLIGRAM(S): at 12:18

## 2024-01-01 RX ADMIN — HEPARIN SODIUM 5000 UNIT(S): 5000 INJECTION INTRAVENOUS; SUBCUTANEOUS at 14:58

## 2024-01-01 RX ADMIN — MIDODRINE HYDROCHLORIDE 30 MILLIGRAM(S): 2.5 TABLET ORAL at 13:08

## 2024-01-01 RX ADMIN — MUPIROCIN 1 APPLICATION(S): 20 OINTMENT TOPICAL at 05:40

## 2024-01-01 RX ADMIN — Medication 50 MILLILITER(S): at 13:50

## 2024-01-01 RX ADMIN — MIDODRINE HYDROCHLORIDE 30 MILLIGRAM(S): 2.5 TABLET ORAL at 05:00

## 2024-01-01 RX ADMIN — Medication 81 MILLIGRAM(S): at 11:55

## 2024-01-01 RX ADMIN — Medication 50 MILLILITER(S): at 10:10

## 2024-01-01 RX ADMIN — Medication 81 MILLIGRAM(S): at 12:09

## 2024-01-01 RX ADMIN — Medication 1 TABLET(S): at 11:47

## 2024-01-01 RX ADMIN — MIDODRINE HYDROCHLORIDE 15 MILLIGRAM(S): 2.5 TABLET ORAL at 13:26

## 2024-01-01 RX ADMIN — MIDODRINE HYDROCHLORIDE 20 MILLIGRAM(S): 2.5 TABLET ORAL at 17:36

## 2024-01-01 RX ADMIN — Medication 4 UNIT(S): at 17:00

## 2024-01-01 RX ADMIN — MIDODRINE HYDROCHLORIDE 30 MILLIGRAM(S): 2.5 TABLET ORAL at 14:51

## 2024-01-01 RX ADMIN — Medication 1 TABLET(S): at 11:31

## 2024-01-01 RX ADMIN — Medication 5 UNIT(S): at 09:20

## 2024-01-01 RX ADMIN — Medication 25 MILLIGRAM(S): at 23:06

## 2024-01-01 RX ADMIN — Medication 75 MICROGRAM(S): at 05:57

## 2024-01-01 RX ADMIN — Medication 325 MILLIGRAM(S): at 17:21

## 2024-01-01 RX ADMIN — FAMOTIDINE 104 MILLIGRAM(S): 10 INJECTION INTRAVENOUS at 13:58

## 2024-01-01 RX ADMIN — Medication 325 MILLIGRAM(S): at 11:30

## 2024-01-01 RX ADMIN — Medication 200 MILLILITER(S): at 06:16

## 2024-01-01 RX ADMIN — Medication 1: at 16:45

## 2024-01-01 RX ADMIN — Medication 1: at 08:25

## 2024-01-01 RX ADMIN — ATORVASTATIN CALCIUM 80 MILLIGRAM(S): 80 TABLET, FILM COATED ORAL at 21:38

## 2024-01-01 RX ADMIN — Medication 6 UNIT(S): at 08:00

## 2024-01-01 RX ADMIN — Medication 15 MILLILITER(S): at 05:20

## 2024-01-01 RX ADMIN — Medication 325 MILLIGRAM(S): at 06:03

## 2024-01-01 RX ADMIN — Medication 500000 UNIT(S): at 11:47

## 2024-01-01 RX ADMIN — Medication 250 MILLIGRAM(S): at 18:07

## 2024-01-01 RX ADMIN — MIDODRINE HYDROCHLORIDE 15 MILLIGRAM(S): 2.5 TABLET ORAL at 17:28

## 2024-01-01 RX ADMIN — CALAMINE AND ZINC OXIDE AND PHENOL 1 APPLICATION(S): 160; 10 LOTION TOPICAL at 14:05

## 2024-01-01 RX ADMIN — MIDODRINE HYDROCHLORIDE 5 MILLIGRAM(S): 2.5 TABLET ORAL at 12:03

## 2024-01-01 RX ADMIN — Medication 3: at 18:05

## 2024-01-01 RX ADMIN — ATORVASTATIN CALCIUM 80 MILLIGRAM(S): 80 TABLET, FILM COATED ORAL at 21:32

## 2024-01-01 RX ADMIN — Medication 1 TABLET(S): at 13:17

## 2024-01-01 RX ADMIN — ATORVASTATIN CALCIUM 80 MILLIGRAM(S): 20 TABLET, FILM COATED ORAL at 22:46

## 2024-01-01 RX ADMIN — DEXMEDETOMIDINE HYDROCHLORIDE 17.2 MICROGRAM(S)/KG/HR: 4 INJECTION, SOLUTION INTRAVENOUS at 11:38

## 2024-01-01 RX ADMIN — Medication 3: at 18:01

## 2024-01-01 RX ADMIN — VASOPRESSIN 3 UNIT(S)/MIN: 20 INJECTION INTRAVENOUS at 22:28

## 2024-01-01 RX ADMIN — ATORVASTATIN CALCIUM 80 MILLIGRAM(S): 20 TABLET, FILM COATED ORAL at 21:18

## 2024-01-01 RX ADMIN — Medication 75 MICROGRAM(S): at 05:37

## 2024-01-01 RX ADMIN — Medication 6 UNIT(S)/MIN: at 08:03

## 2024-01-01 RX ADMIN — Medication 40 MILLIGRAM(S): at 05:36

## 2024-01-01 RX ADMIN — CALAMINE AND ZINC OXIDE AND PHENOL 1 APPLICATION(S): 160; 10 LOTION TOPICAL at 05:46

## 2024-01-01 RX ADMIN — NYSTATIN CREAM 1 APPLICATION(S): 100000 CREAM TOPICAL at 17:39

## 2024-01-01 RX ADMIN — HEPARIN SODIUM 5000 UNIT(S): 5000 INJECTION INTRAVENOUS; SUBCUTANEOUS at 22:26

## 2024-01-01 RX ADMIN — Medication 500000 UNIT(S): at 13:05

## 2024-01-01 RX ADMIN — NYSTATIN CREAM 1 APPLICATION(S): 100000 CREAM TOPICAL at 05:37

## 2024-01-01 RX ADMIN — Medication 4 UNIT(S): at 12:08

## 2024-01-01 RX ADMIN — DROXIDOPA 600 MILLIGRAM(S): 100 CAPSULE ORAL at 06:47

## 2024-01-01 RX ADMIN — Medication 10 UNIT(S): at 17:55

## 2024-01-01 RX ADMIN — INSULIN GLARGINE 15 UNIT(S): 100 INJECTION, SOLUTION SUBCUTANEOUS at 21:39

## 2024-01-01 RX ADMIN — MIDODRINE HYDROCHLORIDE 10 MILLIGRAM(S): 2.5 TABLET ORAL at 22:12

## 2024-01-01 RX ADMIN — Medication 1 APPLICATION(S): at 20:58

## 2024-01-01 RX ADMIN — Medication 3: at 08:58

## 2024-01-01 RX ADMIN — DROXIDOPA 100 MILLIGRAM(S): 100 CAPSULE ORAL at 05:18

## 2024-01-01 RX ADMIN — Medication 1 APPLICATION(S): at 07:22

## 2024-01-01 RX ADMIN — Medication 8 UNIT(S): at 08:02

## 2024-01-01 RX ADMIN — Medication 1: at 10:12

## 2024-01-01 RX ADMIN — HEPARIN SODIUM 5000 UNIT(S): 5000 INJECTION INTRAVENOUS; SUBCUTANEOUS at 14:19

## 2024-01-01 RX ADMIN — Medication 1 APPLICATION(S): at 05:38

## 2024-01-01 RX ADMIN — Medication 1 TABLET(S): at 13:19

## 2024-01-01 RX ADMIN — CALAMINE AND ZINC OXIDE AND PHENOL 1 APPLICATION(S): 160; 10 LOTION TOPICAL at 13:18

## 2024-01-01 RX ADMIN — Medication 1 PACKET(S): at 22:48

## 2024-01-01 RX ADMIN — ATORVASTATIN CALCIUM 80 MILLIGRAM(S): 80 TABLET, FILM COATED ORAL at 22:01

## 2024-01-01 RX ADMIN — Medication 325 MILLIGRAM(S): at 17:16

## 2024-01-01 RX ADMIN — Medication 500000 UNIT(S): at 17:07

## 2024-01-01 RX ADMIN — Medication 4 UNIT(S): at 07:28

## 2024-01-01 RX ADMIN — Medication 500 MILLIGRAM(S): at 11:47

## 2024-01-01 RX ADMIN — CALAMINE AND ZINC OXIDE AND PHENOL 1 APPLICATION(S): 160; 10 LOTION TOPICAL at 15:06

## 2024-01-01 RX ADMIN — DROXIDOPA 600 MILLIGRAM(S): 100 CAPSULE ORAL at 05:56

## 2024-01-01 RX ADMIN — Medication 1 APPLICATION(S): at 12:04

## 2024-01-01 RX ADMIN — DROXIDOPA 600 MILLIGRAM(S): 100 CAPSULE ORAL at 12:55

## 2024-01-01 RX ADMIN — NYSTATIN CREAM 1 APPLICATION(S): 100000 CREAM TOPICAL at 05:09

## 2024-01-01 RX ADMIN — MIDODRINE HYDROCHLORIDE 15 MILLIGRAM(S): 2.5 TABLET ORAL at 17:20

## 2024-01-01 RX ADMIN — Medication 50 MILLIGRAM(S): at 06:27

## 2024-01-01 RX ADMIN — CEFTRIAXONE 100 MILLIGRAM(S): 500 INJECTION, POWDER, FOR SOLUTION INTRAMUSCULAR; INTRAVENOUS at 05:39

## 2024-01-01 RX ADMIN — Medication 50 MILLILITER(S): at 23:29

## 2024-01-01 RX ADMIN — Medication 325 MILLIGRAM(S): at 09:16

## 2024-01-01 RX ADMIN — APIXABAN 5 MILLIGRAM(S): 2.5 TABLET, FILM COATED ORAL at 17:54

## 2024-01-01 RX ADMIN — INSULIN GLARGINE 18 UNIT(S): 100 INJECTION, SOLUTION SUBCUTANEOUS at 23:12

## 2024-01-01 RX ADMIN — Medication 81 MILLIGRAM(S): at 11:59

## 2024-01-01 RX ADMIN — APIXABAN 5 MILLIGRAM(S): 2.5 TABLET, FILM COATED ORAL at 17:08

## 2024-01-01 RX ADMIN — ONDANSETRON 4 MILLIGRAM(S): 8 TABLET, FILM COATED ORAL at 13:39

## 2024-01-01 RX ADMIN — Medication 81 MILLIGRAM(S): at 12:41

## 2024-01-01 RX ADMIN — Medication 4 UNIT(S): at 11:35

## 2024-01-01 RX ADMIN — ERYTHROPOIETIN 10000 UNIT(S): 10000 INJECTION, SOLUTION INTRAVENOUS; SUBCUTANEOUS at 18:52

## 2024-01-01 RX ADMIN — Medication 81 MILLIGRAM(S): at 11:34

## 2024-01-01 RX ADMIN — DROXIDOPA 600 MILLIGRAM(S): 100 CAPSULE ORAL at 13:01

## 2024-01-01 RX ADMIN — Medication 1 PACKET(S): at 13:21

## 2024-01-01 RX ADMIN — MIDODRINE HYDROCHLORIDE 15 MILLIGRAM(S): 2.5 TABLET ORAL at 17:21

## 2024-01-01 RX ADMIN — Medication 6 UNIT(S): at 09:01

## 2024-01-01 RX ADMIN — HEPARIN SODIUM 5000 UNIT(S): 5000 INJECTION INTRAVENOUS; SUBCUTANEOUS at 13:58

## 2024-01-01 RX ADMIN — MIDODRINE HYDROCHLORIDE 15 MILLIGRAM(S): 2.5 TABLET ORAL at 05:39

## 2024-01-01 RX ADMIN — Medication 9 UNIT(S): at 07:27

## 2024-01-01 RX ADMIN — Medication 2: at 13:25

## 2024-01-01 RX ADMIN — ATORVASTATIN CALCIUM 80 MILLIGRAM(S): 80 TABLET, FILM COATED ORAL at 21:08

## 2024-01-01 RX ADMIN — ATORVASTATIN CALCIUM 80 MILLIGRAM(S): 80 TABLET, FILM COATED ORAL at 22:02

## 2024-01-01 RX ADMIN — Medication 1: at 17:44

## 2024-01-01 RX ADMIN — FLUDROCORTISONE ACETATE 0.2 MILLIGRAM(S): 0.1 TABLET ORAL at 18:03

## 2024-01-01 RX ADMIN — Medication 75 MICROGRAM(S): at 05:34

## 2024-01-01 RX ADMIN — NOREPINEPHRINE BITARTRATE 18 MICROGRAM(S)/KG/MIN: 1 INJECTION INTRAVENOUS at 12:31

## 2024-01-01 RX ADMIN — Medication 4: at 05:49

## 2024-01-01 RX ADMIN — Medication 75 MICROGRAM(S): at 06:12

## 2024-01-01 RX ADMIN — DROXIDOPA 200 MILLIGRAM(S): 100 CAPSULE ORAL at 05:33

## 2024-01-01 RX ADMIN — MIDODRINE HYDROCHLORIDE 15 MILLIGRAM(S): 2.5 TABLET ORAL at 18:04

## 2024-01-01 RX ADMIN — Medication 325 MILLIGRAM(S): at 05:27

## 2024-01-01 RX ADMIN — Medication 1 APPLICATION(S): at 10:54

## 2024-01-01 RX ADMIN — PANTOPRAZOLE SODIUM 40 MILLIGRAM(S): 20 TABLET, DELAYED RELEASE ORAL at 09:17

## 2024-01-01 RX ADMIN — Medication 1: at 13:11

## 2024-01-01 RX ADMIN — DROXIDOPA 600 MILLIGRAM(S): 200 CAPSULE ORAL at 17:00

## 2024-01-01 RX ADMIN — Medication 4 UNIT(S): at 12:39

## 2024-01-01 RX ADMIN — Medication 1: at 13:56

## 2024-01-01 RX ADMIN — Medication 1 APPLICATION(S): at 13:20

## 2024-01-01 RX ADMIN — Medication 81 MILLIGRAM(S): at 12:33

## 2024-01-01 RX ADMIN — Medication 100 MILLIGRAM(S): at 06:08

## 2024-01-01 RX ADMIN — Medication 80 MILLIGRAM(S): at 06:13

## 2024-01-01 RX ADMIN — Medication 75 MICROGRAM(S): at 05:59

## 2024-01-01 RX ADMIN — FLUDROCORTISONE ACETATE 0.2 MILLIGRAM(S): 0.1 TABLET ORAL at 05:56

## 2024-01-01 RX ADMIN — Medication 20 MILLIGRAM(S): at 05:38

## 2024-01-01 RX ADMIN — DROXIDOPA 600 MILLIGRAM(S): 100 CAPSULE ORAL at 11:56

## 2024-01-01 RX ADMIN — PANTOPRAZOLE SODIUM 40 MILLIGRAM(S): 20 TABLET, DELAYED RELEASE ORAL at 17:49

## 2024-01-01 RX ADMIN — Medication 2: at 13:02

## 2024-01-01 RX ADMIN — MIDODRINE HYDROCHLORIDE 30 MILLIGRAM(S): 10 TABLET ORAL at 12:39

## 2024-01-01 RX ADMIN — PSYLLIUM HUSK 1 PACKET(S): 3.4 POWDER, FOR SOLUTION ORAL at 14:13

## 2024-01-01 RX ADMIN — Medication 1 MILLIGRAM(S): at 07:35

## 2024-01-01 RX ADMIN — MIDODRINE HYDROCHLORIDE 15 MILLIGRAM(S): 2.5 TABLET ORAL at 06:23

## 2024-01-01 RX ADMIN — Medication 50 MILLIGRAM(S): at 05:11

## 2024-01-01 RX ADMIN — Medication 75 MICROGRAM(S): at 06:14

## 2024-01-01 RX ADMIN — NOREPINEPHRINE BITARTRATE 12.9 MICROGRAM(S)/KG/MIN: 1 INJECTION INTRAVENOUS at 06:52

## 2024-01-01 RX ADMIN — PANTOPRAZOLE SODIUM 40 MILLIGRAM(S): 20 TABLET, DELAYED RELEASE ORAL at 05:11

## 2024-01-01 RX ADMIN — Medication 325 MILLIGRAM(S): at 05:26

## 2024-01-01 RX ADMIN — INSULIN GLARGINE 12 UNIT(S): 100 INJECTION, SOLUTION SUBCUTANEOUS at 21:21

## 2024-01-01 RX ADMIN — Medication 40 MILLIEQUIVALENT(S): at 18:00

## 2024-01-01 RX ADMIN — MIDODRINE HYDROCHLORIDE 15 MILLIGRAM(S): 2.5 TABLET ORAL at 17:25

## 2024-01-01 RX ADMIN — Medication 325 MILLIGRAM(S): at 17:18

## 2024-01-01 RX ADMIN — Medication 325 MILLIGRAM(S): at 09:10

## 2024-01-01 RX ADMIN — INSULIN HUMAN 3 UNIT(S)/HR: 100 INJECTION, SOLUTION SUBCUTANEOUS at 13:21

## 2024-01-01 RX ADMIN — Medication 1: at 09:37

## 2024-01-01 RX ADMIN — HEPARIN SODIUM 5000 UNIT(S): 5000 INJECTION INTRAVENOUS; SUBCUTANEOUS at 14:51

## 2024-01-01 RX ADMIN — INSULIN GLARGINE 20 UNIT(S): 100 INJECTION, SOLUTION SUBCUTANEOUS at 02:00

## 2024-01-01 RX ADMIN — Medication 2 UNIT(S): at 13:53

## 2024-01-01 RX ADMIN — PYRIDOSTIGMINE BROMIDE 60 MILLIGRAM(S): 60 SOLUTION ORAL at 06:05

## 2024-01-01 RX ADMIN — PYRIDOSTIGMINE BROMIDE 30 MILLIGRAM(S): 60 SOLUTION ORAL at 11:32

## 2024-01-01 RX ADMIN — MIDODRINE HYDROCHLORIDE 30 MILLIGRAM(S): 10 TABLET ORAL at 11:44

## 2024-01-01 RX ADMIN — Medication 1 APPLICATION(S): at 14:23

## 2024-01-01 RX ADMIN — Medication 325 MILLIGRAM(S): at 17:17

## 2024-01-01 RX ADMIN — Medication 500 MILLIGRAM(S): at 12:21

## 2024-01-01 RX ADMIN — MIDODRINE HYDROCHLORIDE 30 MILLIGRAM(S): 2.5 TABLET ORAL at 06:11

## 2024-01-01 RX ADMIN — Medication 4 UNIT(S): at 18:41

## 2024-01-01 RX ADMIN — INSULIN LISPRO 12: 100 INJECTION, SOLUTION SUBCUTANEOUS at 13:13

## 2024-01-01 RX ADMIN — Medication 300 MILLIGRAM(S): at 05:31

## 2024-01-01 RX ADMIN — Medication 325 MILLIGRAM(S): at 17:07

## 2024-01-01 RX ADMIN — PROPOFOL 8.98 MICROGRAM(S)/KG/MIN: 10 INJECTION, EMULSION INTRAVENOUS at 19:29

## 2024-01-01 RX ADMIN — FLUDROCORTISONE ACETATE 0.2 MILLIGRAM(S): 0.1 TABLET ORAL at 05:45

## 2024-01-01 RX ADMIN — Medication 81 MILLIGRAM(S): at 12:38

## 2024-01-01 RX ADMIN — FLUDROCORTISONE ACETATE 0.2 MILLIGRAM(S): 0.1 TABLET ORAL at 17:27

## 2024-01-01 RX ADMIN — Medication 2: at 08:53

## 2024-01-01 RX ADMIN — Medication 1 APPLICATION(S): at 12:06

## 2024-01-01 RX ADMIN — DROXIDOPA 200 MILLIGRAM(S): 100 CAPSULE ORAL at 17:59

## 2024-01-01 RX ADMIN — Medication 500000 UNIT(S): at 17:28

## 2024-01-01 RX ADMIN — Medication 60 MILLIGRAM(S): at 11:46

## 2024-01-01 RX ADMIN — Medication 500000 UNIT(S): at 12:36

## 2024-01-01 RX ADMIN — Medication 1 APPLICATION(S): at 06:43

## 2024-01-01 RX ADMIN — HEPARIN SODIUM 5000 UNIT(S): 5000 INJECTION INTRAVENOUS; SUBCUTANEOUS at 17:16

## 2024-01-01 RX ADMIN — Medication 25 MILLIGRAM(S): at 10:35

## 2024-01-01 RX ADMIN — Medication 325 MILLIGRAM(S): at 06:05

## 2024-01-01 RX ADMIN — Medication 3: at 07:49

## 2024-01-01 RX ADMIN — INSULIN GLARGINE 12 UNIT(S): 100 INJECTION, SOLUTION SUBCUTANEOUS at 23:17

## 2024-01-01 RX ADMIN — Medication 75 MICROGRAM(S): at 06:06

## 2024-01-01 RX ADMIN — Medication 56 MICROGRAM(S): at 22:57

## 2024-01-01 RX ADMIN — Medication 500000 UNIT(S): at 00:27

## 2024-01-01 RX ADMIN — Medication 1: at 09:09

## 2024-01-01 RX ADMIN — Medication 6: at 08:53

## 2024-01-01 RX ADMIN — Medication 0.1 MILLIGRAM(S): at 05:18

## 2024-01-01 RX ADMIN — Medication 8 UNIT(S): at 17:30

## 2024-01-01 RX ADMIN — Medication 1 TABLET(S): at 13:50

## 2024-01-01 RX ADMIN — MIDODRINE HYDROCHLORIDE 30 MILLIGRAM(S): 2.5 TABLET ORAL at 22:28

## 2024-01-01 RX ADMIN — FENTANYL CITRATE 25 MICROGRAM(S): 50 INJECTION, SOLUTION INTRAMUSCULAR; INTRAVENOUS at 01:22

## 2024-01-01 RX ADMIN — Medication 1 APPLICATION(S): at 22:42

## 2024-01-01 RX ADMIN — Medication 500000 UNIT(S): at 12:28

## 2024-01-01 RX ADMIN — Medication 1 APPLICATION(S): at 17:33

## 2024-01-01 RX ADMIN — FLUDROCORTISONE ACETATE 0.2 MILLIGRAM(S): 0.1 TABLET ORAL at 06:36

## 2024-01-01 RX ADMIN — HUMAN INSULIN 6 UNIT(S): 100 INJECTION, SUSPENSION SUBCUTANEOUS at 10:02

## 2024-01-01 RX ADMIN — FENTANYL CITRATE 25 MICROGRAM(S): 50 INJECTION, SOLUTION INTRAMUSCULAR; INTRAVENOUS at 10:18

## 2024-01-01 RX ADMIN — PANTOPRAZOLE SODIUM 40 MILLIGRAM(S): 20 TABLET, DELAYED RELEASE ORAL at 17:57

## 2024-01-01 RX ADMIN — Medication 1 APPLICATION(S): at 06:14

## 2024-01-01 RX ADMIN — Medication 81 MILLIGRAM(S): at 14:15

## 2024-01-01 RX ADMIN — MIDODRINE HYDROCHLORIDE 15 MILLIGRAM(S): 2.5 TABLET ORAL at 11:45

## 2024-01-01 RX ADMIN — Medication 1 TABLET(S): at 13:05

## 2024-01-01 RX ADMIN — Medication 2: at 12:54

## 2024-01-01 RX ADMIN — Medication 325 MILLIGRAM(S): at 08:54

## 2024-01-01 RX ADMIN — Medication 75 MICROGRAM(S): at 05:04

## 2024-01-01 RX ADMIN — Medication 4 UNIT(S): at 18:02

## 2024-01-01 RX ADMIN — Medication 2: at 18:04

## 2024-01-01 RX ADMIN — Medication 325 MILLIGRAM(S): at 09:09

## 2024-01-01 RX ADMIN — PYRIDOSTIGMINE BROMIDE 30 MILLIGRAM(S): 60 SOLUTION ORAL at 05:37

## 2024-01-01 RX ADMIN — INSULIN GLARGINE 10 UNIT(S): 100 INJECTION, SOLUTION SUBCUTANEOUS at 21:32

## 2024-01-01 RX ADMIN — CEFEPIME 100 MILLIGRAM(S): 1 INJECTION, POWDER, FOR SOLUTION INTRAMUSCULAR; INTRAVENOUS at 00:30

## 2024-01-01 RX ADMIN — NYSTATIN CREAM 1 APPLICATION(S): 100000 CREAM TOPICAL at 05:44

## 2024-01-01 RX ADMIN — CHLORHEXIDINE GLUCONATE 1 APPLICATION(S): 213 SOLUTION TOPICAL at 22:12

## 2024-01-01 RX ADMIN — INSULIN GLARGINE 24 UNIT(S): 100 INJECTION, SOLUTION SUBCUTANEOUS at 22:25

## 2024-01-01 RX ADMIN — PYRIDOSTIGMINE BROMIDE 60 MILLIGRAM(S): 60 TABLET ORAL at 06:42

## 2024-01-01 RX ADMIN — INSULIN LISPRO 1: 100 INJECTION, SOLUTION SUBCUTANEOUS at 02:16

## 2024-01-01 RX ADMIN — Medication 325 MILLIGRAM(S): at 05:39

## 2024-01-01 RX ADMIN — DROXIDOPA 400 MILLIGRAM(S): 100 CAPSULE ORAL at 12:57

## 2024-01-01 RX ADMIN — ATORVASTATIN CALCIUM 80 MILLIGRAM(S): 80 TABLET, FILM COATED ORAL at 22:21

## 2024-01-01 RX ADMIN — CALAMINE AND ZINC OXIDE AND PHENOL 1 APPLICATION(S): 160; 10 LOTION TOPICAL at 05:10

## 2024-01-01 RX ADMIN — Medication 50 MILLILITER(S): at 02:52

## 2024-01-01 RX ADMIN — Medication 1 PACKET(S): at 21:46

## 2024-01-01 RX ADMIN — DROXIDOPA 400 MILLIGRAM(S): 100 CAPSULE ORAL at 17:21

## 2024-01-01 RX ADMIN — Medication 2: at 08:11

## 2024-01-01 RX ADMIN — VASOPRESSIN 3 UNIT(S)/MIN: 20 INJECTION INTRAVENOUS at 22:13

## 2024-01-01 RX ADMIN — MIDODRINE HYDROCHLORIDE 30 MILLIGRAM(S): 2.5 TABLET ORAL at 13:30

## 2024-01-01 RX ADMIN — Medication 85 MILLIMOLE(S): at 13:12

## 2024-01-01 RX ADMIN — MIDODRINE HYDROCHLORIDE 15 MILLIGRAM(S): 2.5 TABLET ORAL at 05:32

## 2024-01-01 RX ADMIN — CHLORHEXIDINE GLUCONATE 1 APPLICATION(S): 213 SOLUTION TOPICAL at 06:10

## 2024-01-01 RX ADMIN — CALAMINE AND ZINC OXIDE AND PHENOL 1 APPLICATION(S): 160; 10 LOTION TOPICAL at 21:17

## 2024-01-01 RX ADMIN — DROXIDOPA 600 MILLIGRAM(S): 100 CAPSULE ORAL at 05:13

## 2024-01-01 RX ADMIN — Medication 500000 UNIT(S): at 18:26

## 2024-01-01 RX ADMIN — Medication 500000 UNIT(S): at 06:13

## 2024-01-01 RX ADMIN — NYSTATIN CREAM 1 APPLICATION(S): 100000 CREAM TOPICAL at 06:37

## 2024-01-01 RX ADMIN — Medication 1: at 09:14

## 2024-01-01 RX ADMIN — MIDODRINE HYDROCHLORIDE 15 MILLIGRAM(S): 2.5 TABLET ORAL at 17:26

## 2024-01-01 RX ADMIN — FENTANYL CITRATE 6.87 MICROGRAM(S)/KG/HR: 50 INJECTION, SOLUTION INTRAMUSCULAR; INTRAVENOUS at 20:19

## 2024-01-01 RX ADMIN — MIDODRINE HYDROCHLORIDE 20 MILLIGRAM(S): 2.5 TABLET ORAL at 11:57

## 2024-01-01 RX ADMIN — Medication 75 MICROGRAM(S): at 12:39

## 2024-01-01 RX ADMIN — Medication 1 APPLICATION(S): at 18:23

## 2024-01-01 RX ADMIN — Medication 3: at 17:26

## 2024-01-01 RX ADMIN — HEPARIN SODIUM 5000 UNIT(S): 5000 INJECTION INTRAVENOUS; SUBCUTANEOUS at 05:10

## 2024-01-01 RX ADMIN — VASOPRESSIN 9 UNIT(S)/MIN: 20 INJECTION INTRAVENOUS at 20:54

## 2024-01-01 RX ADMIN — Medication 1 APPLICATION(S): at 09:58

## 2024-01-01 RX ADMIN — APIXABAN 5 MILLIGRAM(S): 2.5 TABLET, FILM COATED ORAL at 17:22

## 2024-01-01 RX ADMIN — SENNA PLUS 2 TABLET(S): 8.6 TABLET ORAL at 21:34

## 2024-01-01 RX ADMIN — INSULIN GLARGINE 20 UNIT(S): 100 INJECTION, SOLUTION SUBCUTANEOUS at 21:40

## 2024-01-01 RX ADMIN — PANTOPRAZOLE SODIUM 40 MILLIGRAM(S): 20 TABLET, DELAYED RELEASE ORAL at 06:16

## 2024-01-01 RX ADMIN — HEPARIN SODIUM 5000 UNIT(S): 5000 INJECTION INTRAVENOUS; SUBCUTANEOUS at 05:56

## 2024-01-01 RX ADMIN — Medication 1: at 13:27

## 2024-01-01 RX ADMIN — PYRIDOSTIGMINE BROMIDE 30 MILLIGRAM(S): 60 SOLUTION ORAL at 14:20

## 2024-01-01 RX ADMIN — CALAMINE AND ZINC OXIDE AND PHENOL 1 APPLICATION(S): 160; 10 LOTION TOPICAL at 21:35

## 2024-01-01 RX ADMIN — Medication 81 MILLIGRAM(S): at 13:34

## 2024-01-01 RX ADMIN — CALAMINE AND ZINC OXIDE AND PHENOL 1 APPLICATION(S): 160; 10 LOTION TOPICAL at 14:03

## 2024-01-01 RX ADMIN — Medication 1 APPLICATION(S): at 06:08

## 2024-01-01 RX ADMIN — Medication 325 MILLIGRAM(S): at 08:09

## 2024-01-01 RX ADMIN — Medication 500000 UNIT(S): at 06:43

## 2024-01-01 RX ADMIN — MIDODRINE HYDROCHLORIDE 30 MILLIGRAM(S): 10 TABLET ORAL at 12:11

## 2024-01-01 RX ADMIN — Medication 4 UNIT(S): at 12:38

## 2024-01-01 RX ADMIN — Medication 5 UNIT(S): at 13:10

## 2024-01-01 RX ADMIN — Medication 650 MILLIGRAM(S): at 17:40

## 2024-01-01 RX ADMIN — Medication 6 UNIT(S): at 08:57

## 2024-01-01 RX ADMIN — Medication 325 MILLIGRAM(S): at 05:53

## 2024-01-01 RX ADMIN — APIXABAN 10 MILLIGRAM(S): 2.5 TABLET, FILM COATED ORAL at 21:51

## 2024-01-01 RX ADMIN — Medication 325 MILLIGRAM(S): at 09:54

## 2024-01-01 RX ADMIN — VASOPRESSIN 9 UNIT(S)/MIN: 20 INJECTION INTRAVENOUS at 19:24

## 2024-01-01 RX ADMIN — Medication 81 MILLIGRAM(S): at 13:33

## 2024-01-01 RX ADMIN — HEPARIN SODIUM 5000 UNIT(S): 5000 INJECTION INTRAVENOUS; SUBCUTANEOUS at 06:06

## 2024-01-01 RX ADMIN — Medication 1 PACKET(S): at 10:57

## 2024-01-01 RX ADMIN — MIDODRINE HYDROCHLORIDE 30 MILLIGRAM(S): 2.5 TABLET ORAL at 10:31

## 2024-01-01 RX ADMIN — Medication 2: at 12:42

## 2024-01-01 RX ADMIN — HEPARIN SODIUM 5000 UNIT(S): 5000 INJECTION INTRAVENOUS; SUBCUTANEOUS at 15:05

## 2024-01-01 RX ADMIN — ATORVASTATIN CALCIUM 80 MILLIGRAM(S): 80 TABLET, FILM COATED ORAL at 21:50

## 2024-01-01 RX ADMIN — DROXIDOPA 400 MILLIGRAM(S): 100 CAPSULE ORAL at 11:57

## 2024-01-01 RX ADMIN — Medication 650 MILLIGRAM(S): at 12:08

## 2024-01-01 RX ADMIN — MIDODRINE HYDROCHLORIDE 20 MILLIGRAM(S): 2.5 TABLET ORAL at 21:23

## 2024-01-01 RX ADMIN — Medication 1 APPLICATION(S): at 20:46

## 2024-01-01 RX ADMIN — MIDODRINE HYDROCHLORIDE 10 MILLIGRAM(S): 2.5 TABLET ORAL at 21:01

## 2024-01-01 RX ADMIN — Medication 500000 UNIT(S): at 01:53

## 2024-01-01 RX ADMIN — INSULIN GLARGINE 18 UNIT(S): 100 INJECTION, SOLUTION SUBCUTANEOUS at 22:34

## 2024-01-01 RX ADMIN — INSULIN LISPRO 10 UNIT(S): 100 INJECTION, SOLUTION SUBCUTANEOUS at 08:37

## 2024-01-01 RX ADMIN — Medication 10 UNIT(S): at 18:16

## 2024-01-01 RX ADMIN — Medication 1 TABLET(S): at 14:01

## 2024-01-01 RX ADMIN — PANTOPRAZOLE SODIUM 40 MILLIGRAM(S): 20 TABLET, DELAYED RELEASE ORAL at 17:24

## 2024-01-01 RX ADMIN — HUMAN INSULIN 6 UNIT(S): 100 INJECTION, SUSPENSION SUBCUTANEOUS at 08:12

## 2024-01-01 RX ADMIN — FLUDROCORTISONE ACETATE 0.2 MILLIGRAM(S): 0.1 TABLET ORAL at 17:21

## 2024-01-01 RX ADMIN — Medication 4 UNIT(S): at 13:13

## 2024-01-01 RX ADMIN — SODIUM CHLORIDE 75 MILLILITER(S): 9 INJECTION, SOLUTION INTRAVENOUS at 10:42

## 2024-01-01 RX ADMIN — Medication 325 MILLIGRAM(S): at 17:39

## 2024-01-01 RX ADMIN — Medication 500 MILLIGRAM(S): at 13:50

## 2024-01-01 RX ADMIN — Medication 1 MILLIGRAM(S): at 23:36

## 2024-01-01 RX ADMIN — MIDODRINE HYDROCHLORIDE 20 MILLIGRAM(S): 10 TABLET ORAL at 17:20

## 2024-01-01 RX ADMIN — Medication 50 MILLIGRAM(S): at 04:41

## 2024-01-01 RX ADMIN — Medication 50 MILLIGRAM(S): at 10:42

## 2024-01-01 RX ADMIN — Medication 1 APPLICATION(S): at 18:14

## 2024-01-01 RX ADMIN — PYRIDOSTIGMINE BROMIDE 30 MILLIGRAM(S): 60 SOLUTION ORAL at 17:16

## 2024-01-01 RX ADMIN — Medication 81 MILLIGRAM(S): at 11:31

## 2024-01-01 RX ADMIN — Medication 4: at 09:02

## 2024-01-01 RX ADMIN — Medication 3 UNIT(S): at 09:09

## 2024-01-01 RX ADMIN — Medication 2: at 12:17

## 2024-01-01 RX ADMIN — MIDODRINE HYDROCHLORIDE 10 MILLIGRAM(S): 2.5 TABLET ORAL at 06:05

## 2024-01-01 RX ADMIN — INSULIN GLARGINE 10 UNIT(S): 100 INJECTION, SOLUTION SUBCUTANEOUS at 10:10

## 2024-01-01 RX ADMIN — Medication 500000 UNIT(S): at 06:28

## 2024-01-01 RX ADMIN — Medication 1 APPLICATION(S): at 13:30

## 2024-01-01 RX ADMIN — MIDODRINE HYDROCHLORIDE 15 MILLIGRAM(S): 2.5 TABLET ORAL at 17:37

## 2024-01-01 RX ADMIN — Medication 1 APPLICATION(S): at 05:22

## 2024-01-01 RX ADMIN — Medication 50 MILLIGRAM(S): at 06:18

## 2024-01-01 RX ADMIN — VASOPRESSIN 3 UNIT(S)/MIN: 20 INJECTION INTRAVENOUS at 22:15

## 2024-01-01 RX ADMIN — MUPIROCIN 1 APPLICATION(S): 20 OINTMENT TOPICAL at 17:33

## 2024-01-01 RX ADMIN — Medication 15 MILLILITER(S): at 12:38

## 2024-01-01 RX ADMIN — DROXIDOPA 600 MILLIGRAM(S): 100 CAPSULE ORAL at 11:44

## 2024-01-01 RX ADMIN — Medication 5 UNIT(S): at 08:41

## 2024-01-01 RX ADMIN — CHLORHEXIDINE GLUCONATE 1 APPLICATION(S): 213 SOLUTION TOPICAL at 05:40

## 2024-01-01 RX ADMIN — ATORVASTATIN CALCIUM 80 MILLIGRAM(S): 80 TABLET, FILM COATED ORAL at 23:12

## 2024-01-01 RX ADMIN — Medication 2: at 07:55

## 2024-01-01 RX ADMIN — HYDROMORPHONE HCL 1 MILLIGRAM(S): 0.2 INJECTION, SOLUTION INTRAVENOUS at 01:35

## 2024-01-01 RX ADMIN — Medication 1 APPLICATION(S): at 06:45

## 2024-01-01 RX ADMIN — CHLORHEXIDINE GLUCONATE 1 APPLICATION(S): 213 SOLUTION TOPICAL at 05:16

## 2024-01-01 RX ADMIN — CEFEPIME 100 MILLIGRAM(S): 1 INJECTION, POWDER, FOR SOLUTION INTRAMUSCULAR; INTRAVENOUS at 00:33

## 2024-01-01 RX ADMIN — Medication 1 APPLICATION(S): at 12:24

## 2024-01-01 RX ADMIN — INSULIN GLARGINE 20 UNIT(S): 100 INJECTION, SOLUTION SUBCUTANEOUS at 21:47

## 2024-01-01 RX ADMIN — Medication 4: at 08:10

## 2024-01-01 RX ADMIN — Medication 8 UNIT(S): at 13:56

## 2024-01-01 RX ADMIN — Medication 4: at 12:39

## 2024-01-01 RX ADMIN — VASOPRESSIN 3 UNIT(S)/MIN: 20 INJECTION INTRAVENOUS at 08:12

## 2024-01-01 RX ADMIN — BUMETANIDE 132 MILLIGRAM(S): 0.25 INJECTION INTRAMUSCULAR; INTRAVENOUS at 04:01

## 2024-01-01 RX ADMIN — MEROPENEM 100 MILLIGRAM(S): 500 INJECTION, POWDER, FOR SOLUTION INTRAVENOUS at 18:01

## 2024-01-01 RX ADMIN — CASPOFUNGIN ACETATE 70 MILLIGRAM(S): 5 INJECTION, POWDER, LYOPHILIZED, FOR SOLUTION INTRAVENOUS at 09:43

## 2024-01-01 RX ADMIN — Medication 81 MILLIGRAM(S): at 11:40

## 2024-01-01 RX ADMIN — VASOPRESSIN 9 UNIT(S)/MIN: 20 INJECTION INTRAVENOUS at 19:29

## 2024-01-01 RX ADMIN — Medication 325 MILLIGRAM(S): at 11:34

## 2024-01-01 RX ADMIN — PANTOPRAZOLE SODIUM 40 MILLIGRAM(S): 40 INJECTION, POWDER, FOR SOLUTION INTRAVENOUS at 11:44

## 2024-01-01 RX ADMIN — HEPARIN SODIUM 0 UNIT(S)/HR: 5000 INJECTION INTRAVENOUS; SUBCUTANEOUS at 05:50

## 2024-01-01 RX ADMIN — HEPARIN SODIUM 5000 UNIT(S): 5000 INJECTION INTRAVENOUS; SUBCUTANEOUS at 21:17

## 2024-01-01 RX ADMIN — CALAMINE AND ZINC OXIDE AND PHENOL 1 APPLICATION(S): 160; 10 LOTION TOPICAL at 22:22

## 2024-01-01 RX ADMIN — Medication 1 APPLICATION(S): at 11:32

## 2024-01-01 RX ADMIN — ATORVASTATIN CALCIUM 80 MILLIGRAM(S): 80 TABLET, FILM COATED ORAL at 22:12

## 2024-01-01 RX ADMIN — INSULIN GLARGINE 18 UNIT(S): 100 INJECTION, SOLUTION SUBCUTANEOUS at 22:00

## 2024-01-01 RX ADMIN — MIDODRINE HYDROCHLORIDE 20 MILLIGRAM(S): 2.5 TABLET ORAL at 18:05

## 2024-01-01 RX ADMIN — Medication 2 UNIT(S): at 17:43

## 2024-01-01 RX ADMIN — Medication 1 PACKET(S): at 06:09

## 2024-01-01 RX ADMIN — APIXABAN 5 MILLIGRAM(S): 2.5 TABLET, FILM COATED ORAL at 05:34

## 2024-01-01 RX ADMIN — Medication 4 UNIT(S): at 08:10

## 2024-01-01 RX ADMIN — Medication 500000 UNIT(S): at 00:21

## 2024-01-01 RX ADMIN — Medication 1 TABLET(S): at 12:48

## 2024-01-01 RX ADMIN — MIDODRINE HYDROCHLORIDE 10 MILLIGRAM(S): 2.5 TABLET ORAL at 14:53

## 2024-01-01 RX ADMIN — Medication 81 MILLIGRAM(S): at 13:24

## 2024-01-01 RX ADMIN — Medication 8 UNIT(S): at 17:56

## 2024-01-01 RX ADMIN — DROXIDOPA 600 MILLIGRAM(S): 100 CAPSULE ORAL at 11:35

## 2024-01-01 RX ADMIN — Medication 1: at 16:29

## 2024-01-01 RX ADMIN — Medication 1: at 18:07

## 2024-01-01 RX ADMIN — PANTOPRAZOLE SODIUM 40 MILLIGRAM(S): 40 INJECTION, POWDER, FOR SOLUTION INTRAVENOUS at 12:33

## 2024-01-01 RX ADMIN — PYRIDOSTIGMINE BROMIDE 30 MILLIGRAM(S): 60 SOLUTION ORAL at 06:23

## 2024-01-01 RX ADMIN — Medication 1: at 02:19

## 2024-01-01 RX ADMIN — Medication 1 PACKET(S): at 11:36

## 2024-01-01 RX ADMIN — PYRIDOSTIGMINE BROMIDE 60 MILLIGRAM(S): 60 TABLET ORAL at 14:22

## 2024-01-01 RX ADMIN — Medication 5 UNIT(S): at 08:44

## 2024-01-01 RX ADMIN — FENTANYL CITRATE 6.87 MICROGRAM(S)/KG/HR: 50 INJECTION, SOLUTION INTRAMUSCULAR; INTRAVENOUS at 09:32

## 2024-01-01 RX ADMIN — PYRIDOSTIGMINE BROMIDE 30 MILLIGRAM(S): 60 SOLUTION ORAL at 17:38

## 2024-01-01 RX ADMIN — Medication 5 UNIT(S): at 17:34

## 2024-01-01 RX ADMIN — MIDODRINE HYDROCHLORIDE 15 MILLIGRAM(S): 2.5 TABLET ORAL at 05:18

## 2024-01-01 RX ADMIN — APIXABAN 5 MILLIGRAM(S): 2.5 TABLET, FILM COATED ORAL at 05:09

## 2024-01-01 RX ADMIN — DROXIDOPA 200 MILLIGRAM(S): 100 CAPSULE ORAL at 17:54

## 2024-01-01 RX ADMIN — DROXIDOPA 400 MILLIGRAM(S): 100 CAPSULE ORAL at 05:33

## 2024-01-01 RX ADMIN — PYRIDOSTIGMINE BROMIDE 30 MILLIGRAM(S): 60 SOLUTION ORAL at 17:06

## 2024-01-01 RX ADMIN — MIDODRINE HYDROCHLORIDE 15 MILLIGRAM(S): 2.5 TABLET ORAL at 05:11

## 2024-01-01 RX ADMIN — Medication 500000 UNIT(S): at 18:37

## 2024-01-01 RX ADMIN — SODIUM BICARBONATE 50 MILLIEQUIVALENT(S): 650 TABLET ORAL at 06:20

## 2024-01-01 RX ADMIN — Medication 80 MILLIGRAM(S): at 06:36

## 2024-01-01 RX ADMIN — DEXMEDETOMIDINE HYDROCHLORIDE 17.2 MICROGRAM(S)/KG/HR: 4 INJECTION, SOLUTION INTRAVENOUS at 23:01

## 2024-01-01 RX ADMIN — Medication 500000 UNIT(S): at 23:02

## 2024-01-01 RX ADMIN — SODIUM CHLORIDE 1000 MILLILITER(S): 9 INJECTION INTRAMUSCULAR; INTRAVENOUS; SUBCUTANEOUS at 01:18

## 2024-01-01 RX ADMIN — MIDODRINE HYDROCHLORIDE 10 MILLIGRAM(S): 2.5 TABLET ORAL at 22:14

## 2024-01-01 RX ADMIN — CALAMINE AND ZINC OXIDE AND PHENOL 1 APPLICATION(S): 160; 10 LOTION TOPICAL at 05:38

## 2024-01-01 RX ADMIN — Medication 325 MILLIGRAM(S): at 06:43

## 2024-01-01 RX ADMIN — Medication 500000 UNIT(S): at 13:19

## 2024-01-01 RX ADMIN — Medication 4: at 23:09

## 2024-01-01 RX ADMIN — Medication 100 MILLIGRAM(S): at 05:23

## 2024-01-01 RX ADMIN — FLUDROCORTISONE ACETATE 0.2 MILLIGRAM(S): 0.1 TABLET ORAL at 17:49

## 2024-01-01 RX ADMIN — NYSTATIN CREAM 1 APPLICATION(S): 100000 CREAM TOPICAL at 06:12

## 2024-01-01 RX ADMIN — HEPARIN SODIUM 5000 UNIT(S): 5000 INJECTION INTRAVENOUS; SUBCUTANEOUS at 21:01

## 2024-01-01 RX ADMIN — Medication 1: at 21:09

## 2024-01-01 RX ADMIN — INSULIN GLARGINE 24 UNIT(S): 100 INJECTION, SOLUTION SUBCUTANEOUS at 22:46

## 2024-01-01 RX ADMIN — DROXIDOPA 400 MILLIGRAM(S): 100 CAPSULE ORAL at 12:33

## 2024-01-01 RX ADMIN — Medication 81 MILLIGRAM(S): at 12:58

## 2024-01-01 RX ADMIN — Medication 500000 UNIT(S): at 23:06

## 2024-01-01 RX ADMIN — Medication 500 MILLIGRAM(S): at 12:28

## 2024-01-01 RX ADMIN — HEPARIN SODIUM 5000 UNIT(S): 5000 INJECTION INTRAVENOUS; SUBCUTANEOUS at 22:28

## 2024-01-01 RX ADMIN — ATORVASTATIN CALCIUM 80 MILLIGRAM(S): 80 TABLET, FILM COATED ORAL at 22:54

## 2024-01-01 RX ADMIN — INSULIN LISPRO 6: 100 INJECTION, SOLUTION SUBCUTANEOUS at 00:06

## 2024-01-01 RX ADMIN — Medication 81 MILLIGRAM(S): at 13:22

## 2024-01-01 RX ADMIN — INSULIN GLARGINE 32 UNIT(S): 100 INJECTION, SOLUTION SUBCUTANEOUS at 21:24

## 2024-01-01 RX ADMIN — HEPARIN SODIUM 5000 UNIT(S): 5000 INJECTION INTRAVENOUS; SUBCUTANEOUS at 13:36

## 2024-01-01 RX ADMIN — HEPARIN SODIUM 5000 UNIT(S): 5000 INJECTION INTRAVENOUS; SUBCUTANEOUS at 13:06

## 2024-01-01 RX ADMIN — Medication 75 MICROGRAM(S): at 06:11

## 2024-01-01 RX ADMIN — Medication 500000 UNIT(S): at 06:53

## 2024-01-01 RX ADMIN — HEPARIN SODIUM 5000 UNIT(S): 5000 INJECTION INTRAVENOUS; SUBCUTANEOUS at 13:56

## 2024-01-01 RX ADMIN — Medication 81 MILLIGRAM(S): at 11:35

## 2024-01-01 RX ADMIN — INSULIN HUMAN 3 UNIT(S)/HR: 100 INJECTION, SOLUTION SUBCUTANEOUS at 20:53

## 2024-01-01 RX ADMIN — DROXIDOPA 600 MILLIGRAM(S): 200 CAPSULE ORAL at 14:23

## 2024-01-01 RX ADMIN — ATORVASTATIN CALCIUM 80 MILLIGRAM(S): 80 TABLET, FILM COATED ORAL at 22:15

## 2024-01-01 RX ADMIN — SENNA PLUS 2 TABLET(S): 8.6 TABLET ORAL at 21:47

## 2024-01-01 RX ADMIN — MUPIROCIN 1 APPLICATION(S): 20 OINTMENT TOPICAL at 22:11

## 2024-01-01 RX ADMIN — Medication 3: at 11:47

## 2024-01-01 RX ADMIN — INSULIN GLARGINE 32 UNIT(S): 100 INJECTION, SOLUTION SUBCUTANEOUS at 21:09

## 2024-01-01 RX ADMIN — HEPARIN SODIUM 5000 UNIT(S): 5000 INJECTION INTRAVENOUS; SUBCUTANEOUS at 06:10

## 2024-01-01 RX ADMIN — INSULIN GLARGINE 20 UNIT(S): 100 INJECTION, SOLUTION SUBCUTANEOUS at 21:31

## 2024-01-01 RX ADMIN — Medication 325 MILLIGRAM(S): at 18:37

## 2024-01-01 RX ADMIN — MIDODRINE HYDROCHLORIDE 30 MILLIGRAM(S): 2.5 TABLET ORAL at 06:13

## 2024-01-01 RX ADMIN — CHLORHEXIDINE GLUCONATE 1 APPLICATION(S): 213 SOLUTION TOPICAL at 22:28

## 2024-01-01 RX ADMIN — FAMOTIDINE 104 MILLIGRAM(S): 10 INJECTION INTRAVENOUS at 17:36

## 2024-01-01 RX ADMIN — SODIUM BICARBONATE 200 MEQ/KG/HR: 650 TABLET ORAL at 06:31

## 2024-01-01 RX ADMIN — MIDODRINE HYDROCHLORIDE 10 MILLIGRAM(S): 2.5 TABLET ORAL at 05:20

## 2024-01-01 RX ADMIN — MIDODRINE HYDROCHLORIDE 15 MILLIGRAM(S): 2.5 TABLET ORAL at 12:17

## 2024-01-01 RX ADMIN — Medication 1: at 18:00

## 2024-01-01 RX ADMIN — Medication 7 UNIT(S): at 16:53

## 2024-01-01 RX ADMIN — MUPIROCIN 1 APPLICATION(S): 20 OINTMENT TOPICAL at 22:56

## 2024-01-01 RX ADMIN — CHLORHEXIDINE GLUCONATE 1 APPLICATION(S): 213 SOLUTION TOPICAL at 06:19

## 2024-01-01 RX ADMIN — Medication 1 APPLICATION(S): at 18:17

## 2024-01-01 RX ADMIN — Medication 325 MILLIGRAM(S): at 06:18

## 2024-01-01 RX ADMIN — Medication 15 MILLILITER(S): at 18:01

## 2024-01-01 RX ADMIN — Medication 500000 UNIT(S): at 18:24

## 2024-01-01 RX ADMIN — Medication 250 MILLIGRAM(S): at 17:21

## 2024-01-01 RX ADMIN — Medication 81 MILLIGRAM(S): at 12:27

## 2024-01-01 RX ADMIN — ATORVASTATIN CALCIUM 80 MILLIGRAM(S): 80 TABLET, FILM COATED ORAL at 21:17

## 2024-01-01 RX ADMIN — INSULIN LISPRO 3 UNIT(S): 100 INJECTION, SOLUTION SUBCUTANEOUS at 17:58

## 2024-01-01 RX ADMIN — Medication 1: at 12:53

## 2024-01-01 RX ADMIN — ASPIRIN 81 MILLIGRAM(S): 325 TABLET, FILM COATED ORAL at 11:23

## 2024-01-01 RX ADMIN — HEPARIN SODIUM 0.6 UNIT(S)/HR: 5000 INJECTION INTRAVENOUS; SUBCUTANEOUS at 08:49

## 2024-01-01 RX ADMIN — MIDODRINE HYDROCHLORIDE 20 MILLIGRAM(S): 2.5 TABLET ORAL at 17:43

## 2024-01-01 RX ADMIN — PYRIDOSTIGMINE BROMIDE 60 MILLIGRAM(S): 60 SOLUTION ORAL at 17:44

## 2024-01-01 RX ADMIN — ATORVASTATIN CALCIUM 80 MILLIGRAM(S): 20 TABLET, FILM COATED ORAL at 23:19

## 2024-01-01 RX ADMIN — MIDODRINE HYDROCHLORIDE 15 MILLIGRAM(S): 2.5 TABLET ORAL at 05:08

## 2024-01-01 RX ADMIN — Medication 6 UNIT(S)/MIN: at 23:33

## 2024-01-01 RX ADMIN — MIDODRINE HYDROCHLORIDE 20 MILLIGRAM(S): 10 TABLET ORAL at 18:02

## 2024-01-01 RX ADMIN — FLUDROCORTISONE ACETATE 0.2 MILLIGRAM(S): 0.1 TABLET ORAL at 06:23

## 2024-01-01 RX ADMIN — Medication 325 MILLIGRAM(S): at 19:36

## 2024-01-01 RX ADMIN — Medication 1 APPLICATION(S): at 17:03

## 2024-01-01 RX ADMIN — Medication 2: at 17:56

## 2024-01-01 RX ADMIN — Medication 2 UNIT(S): at 17:24

## 2024-01-01 RX ADMIN — PYRIDOSTIGMINE BROMIDE 30 MILLIGRAM(S): 60 SOLUTION ORAL at 06:05

## 2024-01-01 RX ADMIN — APIXABAN 10 MILLIGRAM(S): 2.5 TABLET, FILM COATED ORAL at 21:44

## 2024-01-01 RX ADMIN — Medication 325 MILLIGRAM(S): at 17:00

## 2024-01-01 RX ADMIN — MIDODRINE HYDROCHLORIDE 15 MILLIGRAM(S): 2.5 TABLET ORAL at 05:56

## 2024-01-01 RX ADMIN — FENTANYL CITRATE 25 MICROGRAM(S): 50 INJECTION, SOLUTION INTRAMUSCULAR; INTRAVENOUS at 17:03

## 2024-01-01 RX ADMIN — HEPARIN SODIUM 5000 UNIT(S): 5000 INJECTION INTRAVENOUS; SUBCUTANEOUS at 05:16

## 2024-01-01 RX ADMIN — Medication 1 TABLET(S): at 12:25

## 2024-01-01 RX ADMIN — MIDODRINE HYDROCHLORIDE 20 MILLIGRAM(S): 2.5 TABLET ORAL at 14:03

## 2024-01-01 RX ADMIN — Medication 325 MILLIGRAM(S): at 17:43

## 2024-01-01 RX ADMIN — HEPARIN SODIUM 5000 UNIT(S): 5000 INJECTION INTRAVENOUS; SUBCUTANEOUS at 07:03

## 2024-01-01 RX ADMIN — Medication 75 MICROGRAM(S): at 05:01

## 2024-01-01 RX ADMIN — Medication 5 UNIT(S): at 09:10

## 2024-01-01 RX ADMIN — HEPARIN SODIUM 5000 UNIT(S): 5000 INJECTION INTRAVENOUS; SUBCUTANEOUS at 22:21

## 2024-01-01 RX ADMIN — DROXIDOPA 200 MILLIGRAM(S): 100 CAPSULE ORAL at 13:22

## 2024-01-01 RX ADMIN — Medication 325 MILLIGRAM(S): at 07:33

## 2024-01-01 RX ADMIN — Medication 1 PACKET(S): at 16:46

## 2024-01-01 RX ADMIN — Medication 500000 UNIT(S): at 17:34

## 2024-01-01 RX ADMIN — HEPARIN SODIUM 5000 UNIT(S): 5000 INJECTION INTRAVENOUS; SUBCUTANEOUS at 05:23

## 2024-01-01 RX ADMIN — Medication 2: at 12:43

## 2024-01-01 RX ADMIN — Medication 5 UNIT(S): at 17:50

## 2024-01-01 RX ADMIN — DEXMEDETOMIDINE HYDROCHLORIDE 17.2 MICROGRAM(S)/KG/HR: 4 INJECTION, SOLUTION INTRAVENOUS at 18:11

## 2024-01-01 RX ADMIN — HEPARIN SODIUM 10000 UNIT(S): 5000 INJECTION INTRAVENOUS; SUBCUTANEOUS at 21:38

## 2024-01-01 RX ADMIN — Medication 1: at 07:48

## 2024-01-01 RX ADMIN — Medication 1 TABLET(S): at 12:22

## 2024-01-01 RX ADMIN — Medication 1 TABLET(S): at 11:55

## 2024-01-01 RX ADMIN — Medication 15.5 MICROGRAM(S)/KG/MIN: at 23:25

## 2024-01-01 RX ADMIN — DEXMEDETOMIDINE HYDROCHLORIDE 17.2 MICROGRAM(S)/KG/HR: 4 INJECTION, SOLUTION INTRAVENOUS at 07:41

## 2024-01-01 RX ADMIN — DROXIDOPA 600 MILLIGRAM(S): 100 CAPSULE ORAL at 17:40

## 2024-01-01 RX ADMIN — MIDODRINE HYDROCHLORIDE 20 MILLIGRAM(S): 2.5 TABLET ORAL at 18:00

## 2024-01-01 RX ADMIN — HEPARIN SODIUM 5000 UNIT(S): 5000 INJECTION INTRAVENOUS; SUBCUTANEOUS at 17:07

## 2024-01-01 RX ADMIN — DEXMEDETOMIDINE HYDROCHLORIDE 17.2 MICROGRAM(S)/KG/HR: 4 INJECTION, SOLUTION INTRAVENOUS at 19:22

## 2024-01-01 RX ADMIN — PYRIDOSTIGMINE BROMIDE 30 MILLIGRAM(S): 60 SOLUTION ORAL at 11:35

## 2024-01-01 RX ADMIN — NYSTATIN CREAM 1 APPLICATION(S): 100000 CREAM TOPICAL at 13:15

## 2024-01-01 RX ADMIN — Medication 650 MILLIGRAM(S): at 18:52

## 2024-01-01 RX ADMIN — Medication 3: at 18:26

## 2024-01-01 RX ADMIN — HEPARIN SODIUM 5000 UNIT(S): 5000 INJECTION INTRAVENOUS; SUBCUTANEOUS at 13:28

## 2024-01-01 RX ADMIN — HEPARIN SODIUM 5000 UNIT(S): 5000 INJECTION INTRAVENOUS; SUBCUTANEOUS at 21:08

## 2024-01-01 RX ADMIN — Medication 75 MICROGRAM(S): at 06:17

## 2024-01-01 RX ADMIN — PYRIDOSTIGMINE BROMIDE 60 MILLIGRAM(S): 60 SOLUTION ORAL at 17:42

## 2024-01-01 RX ADMIN — Medication 4 UNIT(S): at 07:56

## 2024-01-01 RX ADMIN — APIXABAN 10 MILLIGRAM(S): 2.5 TABLET, FILM COATED ORAL at 09:37

## 2024-01-01 RX ADMIN — ATORVASTATIN CALCIUM 80 MILLIGRAM(S): 80 TABLET, FILM COATED ORAL at 21:05

## 2024-01-01 RX ADMIN — ATORVASTATIN CALCIUM 80 MILLIGRAM(S): 80 TABLET, FILM COATED ORAL at 21:12

## 2024-01-01 RX ADMIN — DROXIDOPA 600 MILLIGRAM(S): 100 CAPSULE ORAL at 06:38

## 2024-01-01 RX ADMIN — HEPARIN SODIUM 5000 UNIT(S): 5000 INJECTION INTRAVENOUS; SUBCUTANEOUS at 22:15

## 2024-01-01 RX ADMIN — HEPARIN SODIUM 5000 UNIT(S): 5000 INJECTION INTRAVENOUS; SUBCUTANEOUS at 07:32

## 2024-01-01 RX ADMIN — Medication 200 MILLIGRAM(S): at 05:39

## 2024-01-01 RX ADMIN — Medication 1: at 13:20

## 2024-01-01 RX ADMIN — Medication 1: at 08:39

## 2024-01-01 RX ADMIN — Medication 85 MILLIMOLE(S): at 05:53

## 2024-01-01 RX ADMIN — ONDANSETRON 4 MILLIGRAM(S): 8 TABLET, FILM COATED ORAL at 10:42

## 2024-01-01 RX ADMIN — Medication 325 MILLIGRAM(S): at 06:12

## 2024-01-01 RX ADMIN — Medication 8 UNIT(S): at 17:38

## 2024-01-01 RX ADMIN — Medication 325 MILLIGRAM(S): at 17:10

## 2024-01-01 RX ADMIN — HEPARIN SODIUM 5000 UNIT(S): 5000 INJECTION INTRAVENOUS; SUBCUTANEOUS at 13:20

## 2024-01-01 RX ADMIN — Medication 325 MILLIGRAM(S): at 09:21

## 2024-01-01 RX ADMIN — Medication 81 MILLIGRAM(S): at 11:45

## 2024-01-01 RX ADMIN — SENNA PLUS 2 TABLET(S): 8.6 TABLET ORAL at 06:43

## 2024-01-01 RX ADMIN — MIDODRINE HYDROCHLORIDE 30 MILLIGRAM(S): 10 TABLET ORAL at 06:42

## 2024-01-01 RX ADMIN — HEPARIN SODIUM 5000 UNIT(S): 5000 INJECTION INTRAVENOUS; SUBCUTANEOUS at 13:23

## 2024-01-01 RX ADMIN — VASOPRESSIN 3 UNIT(S)/MIN: 20 INJECTION INTRAVENOUS at 07:27

## 2024-01-01 RX ADMIN — Medication 325 MILLIGRAM(S): at 06:14

## 2024-01-01 RX ADMIN — Medication 10.3 MICROGRAM(S)/KG/MIN: at 06:16

## 2024-01-01 RX ADMIN — Medication 10 MILLIGRAM(S): at 22:38

## 2024-01-01 RX ADMIN — VASOPRESSIN 3 UNIT(S)/MIN: 20 INJECTION INTRAVENOUS at 07:57

## 2024-01-01 RX ADMIN — MUPIROCIN 1 APPLICATION(S): 20 OINTMENT TOPICAL at 05:01

## 2024-01-01 RX ADMIN — APIXABAN 10 MILLIGRAM(S): 2.5 TABLET, FILM COATED ORAL at 10:00

## 2024-01-01 RX ADMIN — Medication 1 PACKET(S): at 10:55

## 2024-01-01 RX ADMIN — DROXIDOPA 400 MILLIGRAM(S): 100 CAPSULE ORAL at 18:03

## 2024-01-01 RX ADMIN — Medication 3 UNIT(S): at 17:58

## 2024-01-01 RX ADMIN — MIDODRINE HYDROCHLORIDE 20 MILLIGRAM(S): 2.5 TABLET ORAL at 17:44

## 2024-01-01 RX ADMIN — BUMETANIDE 10 MG/HR: 0.25 INJECTION INTRAMUSCULAR; INTRAVENOUS at 22:13

## 2024-01-01 RX ADMIN — Medication 60 MILLIGRAM(S): at 05:22

## 2024-01-01 RX ADMIN — PANTOPRAZOLE SODIUM 40 MILLIGRAM(S): 20 TABLET, DELAYED RELEASE ORAL at 06:38

## 2024-01-01 RX ADMIN — Medication 500 MILLIGRAM(S): at 17:39

## 2024-01-01 RX ADMIN — ATORVASTATIN CALCIUM 80 MILLIGRAM(S): 80 TABLET, FILM COATED ORAL at 22:20

## 2024-01-01 RX ADMIN — DROXIDOPA 600 MILLIGRAM(S): 100 CAPSULE ORAL at 13:33

## 2024-01-01 RX ADMIN — Medication 63.75 MILLIMOLE(S): at 21:14

## 2024-01-01 RX ADMIN — CALAMINE AND ZINC OXIDE AND PHENOL 1 APPLICATION(S): 160; 10 LOTION TOPICAL at 17:07

## 2024-01-01 RX ADMIN — PANTOPRAZOLE SODIUM 40 MILLIGRAM(S): 20 TABLET, DELAYED RELEASE ORAL at 09:02

## 2024-01-01 RX ADMIN — CEFTRIAXONE 100 MILLIGRAM(S): 500 INJECTION, POWDER, FOR SOLUTION INTRAMUSCULAR; INTRAVENOUS at 08:55

## 2024-01-01 RX ADMIN — FENTANYL CITRATE 25 MICROGRAM(S): 50 INJECTION, SOLUTION INTRAMUSCULAR; INTRAVENOUS at 16:29

## 2024-01-08 NOTE — ED PROVIDER NOTE - PATIENT PORTAL LINK FT
You can access the FollowMyHealth Patient Portal offered by [facility] by registering at the following website: [email or web address]. By joining [employer]’s FollowMyHealth portal, you will also be able to view your health information using other applications (apps) compatible with our system. You can access the FollowMyHealth Patient Portal offered by Health system by registering at the following website: [email or web address]. By joining [facility]’s FollowMyHealth portal, you will also be able to view your health information using other applications (apps) compatible with our system.

## 2024-01-08 NOTE — ED ADULT NURSE NOTE - NSFALLUNIVINTERV_ED_ALL_ED
Bed/Stretcher in lowest position, wheels locked, appropriate side rails in place/Call bell, personal items and telephone in reach/Instruct patient to call for assistance before getting out of bed/chair/stretcher/Non-slip footwear applied when patient is off stretcher/[place] to call system/Physically safe environment - no spills, clutter or unnecessary equipment/Purposeful proactive rounding/Room/bathroom lighting operational, light cord in reach Bed/Stretcher in lowest position, wheels locked, appropriate side rails in place/Call bell, personal items and telephone in reach/Instruct patient to call for assistance before getting out of bed/chair/stretcher/Non-slip footwear applied when patient is off stretcher/[place] to call system/Physically safe environment - no spills, clutter or unnecessary equipment/Purposeful proactive rounding/Room/bathroom lighting operational, light cord in reach

## 2024-01-08 NOTE — ED ADULT NURSE NOTE - OBJECTIVE STATEMENT
Pt comes from triage. Pt reports episode of vomiting today. Pt states that she has had a cough for the couple of days. Pt reports that last time she had symptoms like this, she was septic and needed emergent dialysis. Pt breathing easy, unlabored, no signs of distress.

## 2024-01-08 NOTE — ED ADULT TRIAGE NOTE - CHIEF COMPLAINT QUOTE
72 y/o female received ambulatory to triage. Alert and oriented x4. C/o "I was here at midnight last night for the same thing and I was sent home with oral antibiotics for a UTI everything else was normal, but the nausea/vomiting has not gone away, this happens all the time and I usually end up very sick." Pt reports she took 1 dose of the oral antibiotics. Pt denies any cp, sob, diarrhea, headache, fever/chills. Respirations even and unlabored. No acute distress noted at this time. 74 y/o female received ambulatory to triage. Alert and oriented x4. C/o "I was here at midnight last night for the same thing and I was sent home with oral antibiotics for a UTI everything else was normal, but the nausea/vomiting has not gone away, this happens all the time and I usually end up very sick." Pt reports she took 1 dose of the oral antibiotics. Pt denies any cp, sob, diarrhea, headache, fever/chills. Respirations even and unlabored. No acute distress noted at this time.

## 2024-01-08 NOTE — ED PROVIDER NOTE - CLINICAL SUMMARY MEDICAL DECISION MAKING FREE TEXT BOX
73-year-old female history of CKD UTI hypertension diabetes left bundle branch block complaining of episode of nausea vomiting today.  Denies any back pain fever chills.  States whenever this happens she ends up having a UTI.  No medications taken for this prior to arrival.    r/o uti/sepsis, labs, UA

## 2024-01-08 NOTE — ED PROVIDER NOTE - NSFOLLOWUPINSTRUCTIONS_ED_ALL_ED_FT
1) Follow-up with your Primary Medical Doctor or referred doctor. Call today / next business day for prompt follow-up.  2) Return to Emergency room for any worsening or persistent pain, weakness, fever, or any other concerning symptoms.  3) See attached instruction sheets for additional information, including information regarding signs and symptoms to look out for, reasons to seek immediate care and other important instructions.  4) Follow-up with any specialists as discussed / noted as well.   5) Take cipro 250mg twice a day for 7 days.      A urinary tract infection (UTI) is an infection of any part of the urinary tract. The urinary tract includes the kidneys, ureters, bladder, and urethra. These organs make, store, and get rid of urine in the body.    An upper UTI affects the ureters and kidneys. A lower UTI affects the bladder and urethra.    What are the causes?  Most urinary tract infections are caused by bacteria in your genital area around your urethra, where urine leaves your body. These bacteria grow and cause inflammation of your urinary tract.    What increases the risk?  You are more likely to develop this condition if:  You have a urinary catheter that stays in place.  You are not able to control when you urinate or have a bowel movement (incontinence).  You are female and you:  Use a spermicide or diaphragm for birth control.  Have low estrogen levels.  Are pregnant.  You have certain genes that increase your risk.  You are sexually active.  You take antibiotic medicines.  You have a condition that causes your flow of urine to slow down, such as:  An enlarged prostate, if you are male.  Blockage in your urethra.  A kidney stone.  A nerve condition that affects your bladder control (neurogenic bladder).  Not getting enough to drink, or not urinating often.  You have certain medical conditions, such as:  Diabetes.  A weak disease-fighting system (immunesystem).  Sickle cell disease.  Gout.  Spinal cord injury.  What are the signs or symptoms?  Symptoms of this condition include:  Needing to urinate right away (urgency).  Frequent urination. This may include small amounts of urine each time you urinate.  Pain or burning with urination.  Blood in the urine.  Urine that smells bad or unusual.  Trouble urinating.  Cloudy urine.  Vaginal discharge, if you are female.  Pain in the abdomen or the lower back.  You may also have:  Vomiting or a decreased appetite.  Confusion.  Irritability or tiredness.  A fever or chills.  Diarrhea.  The first symptom in older adults may be confusion. In some cases, they may not have any symptoms until the infection has worsened.    How is this diagnosed?  This condition is diagnosed based on your medical history and a physical exam. You may also have other tests, including:  Urine tests.  Blood tests.  Tests for STIs (sexually transmitted infections).  If you have had more than one UTI, a cystoscopy or imaging studies may be done to determine the cause of the infections.    How is this treated?  Treatment for this condition includes:  Antibiotic medicine.  Over-the-counter medicines to treat discomfort.  Drinking enough water to stay hydrated.  If you have frequent infections or have other conditions such as a kidney stone, you may need to see a health care provider who specializes in the urinary tract (urologist).    In rare cases, urinary tract infections can cause sepsis. Sepsis is a life-threatening condition that occurs when the body responds to an infection. Sepsis is treated in the hospital with IV antibiotics, fluids, and other medicines.    Follow these instructions at home:    Medicines    Take over-the-counter and prescription medicines only as told by your health care provider.  If you were prescribed an antibiotic medicine, take it as told by your health care provider. Do not stop using the antibiotic even if you start to feel better.  General instructions    Make sure you:  Empty your bladder often and completely. Do not hold urine for long periods of time.  Empty your bladder after sex.  Wipe from front to back after urinating or having a bowel movement if you are female. Use each tissue only one time when you wipe.  Drink enough fluid to keep your urine pale yellow.  Keep all follow-up visits. This is important.  Contact a health care provider if:  Your symptoms do not get better after 1–2 days.  Your symptoms go away and then return.  Get help right away if:  You have severe pain in your back or your lower abdomen.  You have a fever or chills.  You have nausea or vomiting.  Summary  A urinary tract infection (UTI) is an infection of any part of the urinary tract, which includes the kidneys, ureters, bladder, and urethra.  Most urinary tract infections are caused by bacteria in your genital area.  Treatment for this condition often includes antibiotic medicines.  If you were prescribed an antibiotic medicine, take it as told by your health care provider. Do not stop using the antibiotic even if you start to feel better.  Keep all follow-up visits. This is important.  This information is not intended to replace advice given to you by your health care provider. Make sure you discuss any questions you have with your health care provider.

## 2024-01-08 NOTE — ED PROVIDER NOTE - OBJECTIVE STATEMENT
73-year-old female history of CKD UTI hypertension diabetes left bundle branch block complaining of episode of nausea vomiting today.  Denies any back pain fever chills.  States whenever this happens she ends up having a UTI.  No medications taken for this prior to arrival.

## 2024-01-09 NOTE — ED ADULT NURSE REASSESSMENT NOTE - NS ED NURSE REASSESS COMMENT FT1
pts iv became dislodged several attempts to restart an iv unsucessful admitting md called request for a midline which was ordered ir called unable to place line today icu called for assist with guided ultrasound currently unavailable 24 angio in left hand pts only iv access

## 2024-01-09 NOTE — CARE COORDINATION ASSESSMENT. - NSDCPLANREVIEW_GEN_ALL_CORE
05/17/23      Mary Ellen Barillas  [address] [facility] [id number]    Dear Mary Ellen,    We look forward to seeing you on 11/13/23 at 9:00 with an 8:00 arrival for your procedure.    To better prepare you, please observe the following:     Preparing for GI Procedure    Please schedule an appointment within 30 days of surgery with your primary care doctor for a history and physical. They will perform any required labs or tests before surgery. If your doctor is not an [facility] physician, please ask them to fax the H&P and test results to [phone].        You will receive a call from our Pre-Admission Testing department prior to your surgical procedure. This call is necessary to get important information about your health history, to ensure you are properly prepared for surgery, and minimize the chance of having your procedure delayed or rescheduled.    WHAT TO DO ABOUT YOUR PRESCRIPTION MEDICATIONS  You must continue taking all your previously prescribed medications as directed unless specifically told not to by your doctor, or if you find them on the list below    STOP THESE MEDICATIONS   Aspirin stop 3 days prior unless instructed otherwise by your doctor or Cardiologist.   Erectile dysfunction medications stop 2 days prior    Herbal medications/ Vitamins/ Fish Oil stop 7 days prior unless otherwise directed by your doctor.    Phentermine stop 7 days prior   Selegiline patches stop 21 days prior   Your doctor will have given you instructions about modifying your Insulin regimen 1 day prior.  If on a blood thinner, ask your doctor, Cardiologist and/or surgeon if and when it should be stopped.    You will be contacted by phone or email by a company called [email or web address]. This is an online educational platform that will provide education regarding your procedure. Please take the time to review this video.    Day before Procedure  Please follow any specific instructions given to you regarding any prep needed  related to your procedure.    Day of Procedure  Take your normal morning medications with a sip of water first thing in the morning prior to surgery, except those medications your doctor or our Pre-Admission Testing office has specifically told you not to take.    Other than a sip of water to wash down medications, DO NOT PUT ANYTHING IN YOUR MOUTH FOR AT LEAST FOUR HOURS PRIOR TO YOUR PROCEDURE.  This includes gum, candy, cigarettes, and chewing tobacco.       If you choose, for your convenience we have an outpatient pharmacy on-site and can arrange to have your discharge prescription filled before you leave, to save you a trip. You may want to bring enough money to cover the cost of this prescription.      Each patient that comes through the GI department is given individualized care and attention. The doctors and nurses spend the time necessary with each patient to ensure the best care. Sometimes, this can cause delays. You may want to bring a book, puzzle or music player to keep you busy if you experience a delay.       Procedures usually involve giving medications that put you to sleep or ease anxiety. For this reason, you must have a ride home from the hospital and someone (over age 16) should stay with you for the first 24 hours after your procedure. The procedure will be canceled if you do not have a responsible person with you. You are not allowed to drive after receiving sedation of any type.     Visitor Restrictions are currently in place due to COVID-19.  You can bring one designated adult (18 yr or older) with you to your surgery. Additional COVID related restrictions may apply.     Bring a photo ID, your insurance card and any copay due at time of service. Leave all valuables at home. Do not wear jewelry, makeup, body lotion, or fingernail polish.        If you have any questions or concerns before the day of your procedure, please call [phone] to have them answered.     After your procedure you  Patient may get a survey via email or text message. Please take the opportunity to fill it out letting us know what we did well or what we could improve on. Our goal is to provide our patients with the best possible care and we couldn’t do it without your input.     We look forward to seeing you,  Your GI Services Team

## 2024-01-09 NOTE — ED PROVIDER NOTE - OBJECTIVE STATEMENT
73-year-old female history of CKD UTI hypertension diabetes left bundle branch block was seen yesterday in the ED diagnosed with UTI patient typically has when she has isolated nausea vomiting.  Was started on outpatient antibiotics however patient returning complaining about worsening nausea vomiting unable to tolerate antibiotics here.  Feels very dehydrated.  Denies any fever or chills.  Denies any abdominal pain. 73-year-old female history of CKD UTI hypertension diabetes left bundle branch block was seen yesterday in the ED diagnosed with UTI patient typically has when she has isolated nausea vomiting.  Was started on outpatient antibiotics however patient returning complaining about worsening nausea vomiting unable to tolerate antibiotics here.  Feels very dehydrated.  Denies any fever or chills.  Denies any abdominal pain.    PMD Oksana

## 2024-01-09 NOTE — H&P ADULT - PROBLEM SELECTOR PLAN 7
Chronic, baseline Cr appears 3-4   - currently at baseline   - monitor CMP Chronic   - Home synthroid converted to IV

## 2024-01-09 NOTE — ED PROVIDER NOTE - CLINICAL SUMMARY MEDICAL DECISION MAKING FREE TEXT BOX
73-year-old female history of CKD UTI hypertension diabetes left bundle branch block was seen yesterday in the ED diagnosed with UTI patient typically has when she has isolated nausea vomiting.  Was started on outpatient antibiotics however patient returning complaining about worsening nausea vomiting unable to tolerate antibiotics here.  Feels very dehydrated.  Denies any fever or chills.  Denies any abdominal pain.    +UTI dx'ed yesterday now with n/v, unable to tolerate PO, IV fluids, IV abx, admit

## 2024-01-09 NOTE — H&P ADULT - PROBLEM SELECTOR PLAN 1
Pt presents to the ED for UTI   - UA showed +nitrite, mod leuk est, many bacteria, +SC   - s/p Rocephin x1, 1L NS bolus x1, Zofran x1 in the ED   - lactate 1.6  - continue rocephin x3 days   - f/u urine culture   - Monitor for leukocytosis Pt presents to the ED for UTI   - UA showed +nitrite, mod leuk est, many bacteria, +SC   - s/p Rocephin x1, 1L NS bolus x1, Zofran x1 in the ED   - lactate 1.6  - continue rocephin x3 days   - f/u urine culture   - f/u BCx x2  - Monitor for leukocytosis

## 2024-01-09 NOTE — H&P ADULT - PROBLEM SELECTOR PLAN 3
Chronic   - Home regimen:   - LDSS   - accuchecks   - Hypoglycemia protocol Chronic   - continue home metoprolol with hold parameters; transitioned to Lopressor 5mg q6hrs for IV dosing as pt cannot tolerate PO   - can resume Metop succinate 100mg qd when able   - HOLD home ASA as pt unable to tolerate PO, continue when able   - Monitor hemodynamics

## 2024-01-09 NOTE — CONSULT NOTE ADULT - SUBJECTIVE AND OBJECTIVE BOX
Patient is a 73y old  Female who presents with a chief complaint of UTI (09 Jan 2024 16:52)       HPI:  74yo F with PMHx T2DM, HFrEF, HTN, hypothyroidism, LBBB, chronic lymphedema, CKD presents to the ED for nausea and vomiting. Pt came to the ED yesterday for nausea and non-bloody emesis, was found to have a UTI, and sent home on Cipro. Pt unable to tolerate PO at home so she came back to the ED. Pt states that partner has been sick at home. No recent travel.  Pt recently admitted to [facility] fo e.coli bacteremia from Aug 11-15.   Of note, Pt additionally admitted in June with septic shock and E. coli bacteremia 2/2 left pyelo with hydro- no obtructive uropathy on CT. She was on pressors and given 10 days of iv rocephin.   Sees Dr. Wilde for outpatient nephrologist.  Was previously on dialysis but was able to come off. Has had decreased PO intake and N/V. + sick contacts.       PAST MEDICAL & SURGICAL HISTORY:  Hypertension      Diabetes      Lymphedema      H/O left bundle branch block      History of left bundle branch block (LBBB)      Systolic heart failure, chronic      H/O cataract  2020      History of surgical removal of meniscus of knee  left in 1971      Frozen shoulder  2000      H/O Achilles tendon repair  lengthened bilaterally, 2000      Fractured skull  1968           FAMILY HISTORY:  Family history of CVA  mom, age 57    [place]    Social History:Non smoker    MEDICATIONS  (STANDING):  cefTRIAXone   IVPB 1000 milliGRAM(s) IV Intermittent every 24 hours  dextrose 5%. 1000 milliLiter(s) (50 mL/Hr) IV Continuous <Continuous>  dextrose 5%. 1000 milliLiter(s) (100 mL/Hr) IV Continuous <Continuous>  dextrose 50% Injectable 25 Gram(s) IV Push once  dextrose 50% Injectable 25 Gram(s) IV Push once  dextrose 50% Injectable 12.5 Gram(s) IV Push once  famotidine  IVPB 20 milliGRAM(s) IV Intermittent daily  furosemide   Injectable 20 milliGRAM(s) IV Push two times a day  glucagon  Injectable 1 milliGRAM(s) IntraMuscular once  heparin   Injectable 5000 Unit(s) SubCutaneous every 8 hours  insulin glargine Injectable (LANTUS) 26 Unit(s) SubCutaneous at bedtime  insulin lispro (ADMELOG) corrective regimen sliding scale   SubCutaneous three times a day before meals  insulin lispro (ADMELOG) corrective regimen sliding scale   SubCutaneous at bedtime  lactated ringers. 1000 milliLiter(s) (70 mL/Hr) IV Continuous <Continuous>  levothyroxine Injectable 56 MICROGram(s) IV Push at bedtime    MEDICATIONS  (PRN):  dextrose Oral Gel 15 Gram(s) Oral once PRN Blood Glucose LESS THAN 70 milliGRAM(s)/deciliter  trimethobenzamide Injectable 200 milliGRAM(s) IntraMuscular every 8 hours PRN Nausea and/or Vomiting   Meds reviewed    Allergies    penicillins (Hives)  Ativan (Rash; Urticaria)    Intolerances         REVIEW OF SYSTEMS:    Review of Systems:   Constitutional: + fatigue  HEENT: Denies headaches and dizziness  Respiratory: denies SOB, cough, or wheezing  Cardiovascular: denies CP, palpitations  Gastrointestinal: + N/V  Genitourinary: denies painful urination, increased frequency, urgency, or bloody urine  Skin: denies rashes or itching  Musculoskeletal: denies muscle aches, joint swelling  Neurologic: Denies generalized weakness, denies loss of sensation, numbness, or tingling      Vital Signs Last 24 Hrs  T(C): 36.6 (09 Jan 2024 16:37), Max: 36.8 (08 Jan 2024 22:24)  T(F): 97.9 (09 Jan 2024 16:37), Max: 98.2 (08 Jan 2024 22:24)  HR: 64 (09 Jan 2024 16:37) (51 - 66)  BP: 122/72 (09 Jan 2024 16:37) (115/67 - 151/75)  BP(mean): --  RR: 16 (09 Jan 2024 16:37) (16 - 18)  SpO2: 95% (09 Jan 2024 16:37) (94% - 99%)    Parameters below as of 09 Jan 2024 13:05  Patient On (Oxygen Delivery Method): room air      Daily Height in cm: 180.34 (08 Jan 2024 22:24)    Daily     PHYSICAL EXAM:    GENERAL: ill appearing  HEAD:  Atraumatic, Normocephalic  EYES: EOMI, conjunctiva and sclera clear  ENMT: No Drainage from nares, No drainage from ears  NERVOUS SYSTEM:  Awake and Alert  CHEST/LUNG: Clear to percussion bilaterally  EXTREMITIES:  ++Edema  SKIN: No rashes No obvious ecchymosis      LABS:                        12.6   9.86  )-----------( 231      ( 09 Jan 2024 01:23 )             40.1     01-09    136  |  107  |  57<H>  ----------------------------<  192<H>  4.4   |  22  |  2.40<H>    Ca    9.0      09 Jan 2024 16:40  Phos  4.5     01-09  Mg     2.4     01-09    TPro  8.1  /  Alb  3.7  /  TBili  0.9  /  DBili  x   /  AST  13<L>  /  ALT  14  /  AlkPhos  68  01-09    PT/INR - ( 09 Jan 2024 01:23 )   PT: 12.4 sec;   INR: 1.06 ratio         PTT - ( 09 Jan 2024 01:23 )  PTT:30.9 sec  Urinalysis Basic - ( 09 Jan 2024 16:40 )    Color: x / Appearance: x / SG: x / pH: x  Gluc: 192 mg/dL / Ketone: x  / Bili: x / Urobili: x   Blood: x / Protein: x / Nitrite: x   Leuk Esterase: x / RBC: x / WBC x   Sq Epi: x / Non Sq Epi: x / Bacteria: x      Magnesium: 2.4 mg/dL (01-09 @ 16:40)  Phosphorus: 4.5 mg/dL (01-09 @ 16:40)          RADIOLOGY & ADDITIONAL TESTS:   Patient is a 73y old  Female who presents with a chief complaint of UTI (09 Jan 2024 16:52)       HPI:  72yo F with PMHx T2DM, HFrEF, HTN, hypothyroidism, LBBB, chronic lymphedema, CKD presents to the ED for nausea and vomiting. Pt came to the ED yesterday for nausea and non-bloody emesis, was found to have a UTI, and sent home on Cipro. Pt unable to tolerate PO at home so she came back to the ED. Pt states that partner has been sick at home. No recent travel.  Pt recently admitted to [facility] fo e.coli bacteremia from Aug 11-15.   Of note, Pt additionally admitted in June with septic shock and E. coli bacteremia 2/2 left pyelo with hydro- no obtructive uropathy on CT. She was on pressors and given 10 days of iv rocephin.   Sees Dr. Wilde for outpatient nephrologist.  Was previously on dialysis but was able to come off. Has had decreased PO intake and N/V. + sick contacts.       PAST MEDICAL & SURGICAL HISTORY:  Hypertension      Diabetes      Lymphedema      H/O left bundle branch block      History of left bundle branch block (LBBB)      Systolic heart failure, chronic      H/O cataract  2020      History of surgical removal of meniscus of knee  left in 1971      Frozen shoulder  2000      H/O Achilles tendon repair  lengthened bilaterally, 2000      Fractured skull  1968           FAMILY HISTORY:  Family history of CVA  mom, age 57    [place]    Social History:Non smoker    MEDICATIONS  (STANDING):  cefTRIAXone   IVPB 1000 milliGRAM(s) IV Intermittent every 24 hours  dextrose 5%. 1000 milliLiter(s) (50 mL/Hr) IV Continuous <Continuous>  dextrose 5%. 1000 milliLiter(s) (100 mL/Hr) IV Continuous <Continuous>  dextrose 50% Injectable 25 Gram(s) IV Push once  dextrose 50% Injectable 25 Gram(s) IV Push once  dextrose 50% Injectable 12.5 Gram(s) IV Push once  famotidine  IVPB 20 milliGRAM(s) IV Intermittent daily  furosemide   Injectable 20 milliGRAM(s) IV Push two times a day  glucagon  Injectable 1 milliGRAM(s) IntraMuscular once  heparin   Injectable 5000 Unit(s) SubCutaneous every 8 hours  insulin glargine Injectable (LANTUS) 26 Unit(s) SubCutaneous at bedtime  insulin lispro (ADMELOG) corrective regimen sliding scale   SubCutaneous three times a day before meals  insulin lispro (ADMELOG) corrective regimen sliding scale   SubCutaneous at bedtime  lactated ringers. 1000 milliLiter(s) (70 mL/Hr) IV Continuous <Continuous>  levothyroxine Injectable 56 MICROGram(s) IV Push at bedtime    MEDICATIONS  (PRN):  dextrose Oral Gel 15 Gram(s) Oral once PRN Blood Glucose LESS THAN 70 milliGRAM(s)/deciliter  trimethobenzamide Injectable 200 milliGRAM(s) IntraMuscular every 8 hours PRN Nausea and/or Vomiting   Meds reviewed    Allergies    penicillins (Hives)  Ativan (Rash; Urticaria)    Intolerances         REVIEW OF SYSTEMS:    Review of Systems:   Constitutional: + fatigue  HEENT: Denies headaches and dizziness  Respiratory: denies SOB, cough, or wheezing  Cardiovascular: denies CP, palpitations  Gastrointestinal: + N/V  Genitourinary: denies painful urination, increased frequency, urgency, or bloody urine  Skin: denies rashes or itching  Musculoskeletal: denies muscle aches, joint swelling  Neurologic: Denies generalized weakness, denies loss of sensation, numbness, or tingling      Vital Signs Last 24 Hrs  T(C): 36.6 (09 Jan 2024 16:37), Max: 36.8 (08 Jan 2024 22:24)  T(F): 97.9 (09 Jan 2024 16:37), Max: 98.2 (08 Jan 2024 22:24)  HR: 64 (09 Jan 2024 16:37) (51 - 66)  BP: 122/72 (09 Jan 2024 16:37) (115/67 - 151/75)  BP(mean): --  RR: 16 (09 Jan 2024 16:37) (16 - 18)  SpO2: 95% (09 Jan 2024 16:37) (94% - 99%)    Parameters below as of 09 Jan 2024 13:05  Patient On (Oxygen Delivery Method): room air      Daily Height in cm: 180.34 (08 Jan 2024 22:24)    Daily     PHYSICAL EXAM:    GENERAL: ill appearing  HEAD:  Atraumatic, Normocephalic  EYES: EOMI, conjunctiva and sclera clear  ENMT: No Drainage from nares, No drainage from ears  NERVOUS SYSTEM:  Awake and Alert  CHEST/LUNG: Clear to percussion bilaterally  EXTREMITIES:  ++Edema  SKIN: No rashes No obvious ecchymosis      LABS:                        12.6   9.86  )-----------( 231      ( 09 Jan 2024 01:23 )             40.1     01-09    136  |  107  |  57<H>  ----------------------------<  192<H>  4.4   |  22  |  2.40<H>    Ca    9.0      09 Jan 2024 16:40  Phos  4.5     01-09  Mg     2.4     01-09    TPro  8.1  /  Alb  3.7  /  TBili  0.9  /  DBili  x   /  AST  13<L>  /  ALT  14  /  AlkPhos  68  01-09    PT/INR - ( 09 Jan 2024 01:23 )   PT: 12.4 sec;   INR: 1.06 ratio         PTT - ( 09 Jan 2024 01:23 )  PTT:30.9 sec  Urinalysis Basic - ( 09 Jan 2024 16:40 )    Color: x / Appearance: x / SG: x / pH: x  Gluc: 192 mg/dL / Ketone: x  / Bili: x / Urobili: x   Blood: x / Protein: x / Nitrite: x   Leuk Esterase: x / RBC: x / WBC x   Sq Epi: x / Non Sq Epi: x / Bacteria: x      Magnesium: 2.4 mg/dL (01-09 @ 16:40)  Phosphorus: 4.5 mg/dL (01-09 @ 16:40)          RADIOLOGY & ADDITIONAL TESTS:

## 2024-01-09 NOTE — H&P ADULT - PROBLEM SELECTOR PLAN 8
Continue heparin 5000 q8hrs Chronic, baseline Cr appears 3-4   - currently at baseline   - monitor CMP

## 2024-01-09 NOTE — CARE COORDINATION ASSESSMENT. - NSCAREPROVIDERS_GEN_ALL_CORE_FT
CARE PROVIDERS:  Accepting Physician: Jimmy Thomson  Administration: Semaj Smith  Administration: Bernard Julien  Administration: Vic Browne  Administration: Timmy Valdes  Administration: Margie Singh  Admitting: Doctor, Unknown  Attending: Doctor, Unknown  Case Management: Vineet Rudd  Covering Team: Jamil Dennison  Covering Team: Beto Taveras  ED Attending: Orville Gonzalez  ED Nurse: Karol Marx  Ordered: ADM, User  Outpatient Provider: Julius Jimenez  Outpatient Provider: Alec Maynard  PCA/Nursing Assistant: Jazzmine Roberts  PCA/Nursing Assistant: Rosemary Hines  PCA/Nursing Assistant: Isamar Can  Primary Team: Verona Hudson  Primary Team: Celine Rahman  Primary Team: Minoo Clinton  Team: JANET  Hospitalists, Team  UR// Supp. Assoc.: Tiara Alford  UR// Supp. Assoc.: Mey Gould   CARE PROVIDERS:  Accepting Physician: Jimmy Thomson  Administration: Semaj Smith  Administration: Bernard Julien  Administration: Vic Browne  Administration: Timmy Valdes  Administration: Margei Singh  Admitting: Doctor, Unknown  Attending: Doctor, Unknown  Case Management: Vineet Rudd  Covering Team: Jamil Dennison  Covering Team: Beto Taveras  ED Attending: Orville Gonzalez  ED Nurse: Karol Marx  Ordered: ADM, User  Outpatient Provider: Julius Jimenez  Outpatient Provider: Alec Maynard  PCA/Nursing Assistant: Jazzmine Roberts  PCA/Nursing Assistant: Rosemary Hines  PCA/Nursing Assistant: Isamar Can  Primary Team: Verona Hudson  Primary Team: Celine Rahman  Primary Team: Minoo Clinton  Team: JANET  Hospitalists, Team  UR// Supp. Assoc.: Tiara Alford  UR// Supp. Assoc.: Mey Gould

## 2024-01-09 NOTE — H&P ADULT - ASSESSMENT
74yo F with PMHx T2DM, HFrEF, HTN, hypothyroidism, LBBB, chronic lymphedema, CKD presents to the ED for nausea and vomiting. Admitted for UTI and inability to tolerate PO   74yo F with PMHx T2DM, HFrEF, HTN, hypothyroidism, LBBB, chronic lymphedema, CKD presents to the ED for nausea and vomiting. Admitted for UTI and inability to tolerate PO.   72yo F with PMHx T2DM, HFrEF, HTN, hypothyroidism, LBBB, chronic lymphedema, CKD presents to the ED for nausea and vomiting. Admitted for UTI and inability to tolerate PO.

## 2024-01-09 NOTE — ED ADULT NURSE NOTE - CHIEF COMPLAINT QUOTE
72 y/o female received ambulatory to triage. Alert and oriented x4. C/o "I was here at midnight last night for the same thing and I was sent home with oral antibiotics for a UTI everything else was normal, but the nausea/vomiting has not gone away, this happens all the time and I usually end up very sick." Pt reports she took 1 dose of the oral antibiotics. Pt denies any cp, sob, diarrhea, headache, fever/chills. Respirations even and unlabored. No acute distress noted at this time.

## 2024-01-09 NOTE — H&P ADULT - ATTENDING COMMENTS
72yo F with PMHx T2DM, HFrEF, HTN, hypothyroidism, LBBB, chronic lymphedema, CKD presents to the ED for nausea and vomiting. Admitted for UTI and inability to tolerate PO    Cont pt on Ceftriaxone, f/u urine culture, monitor WBC and fever curve.  IVF NS, tigan prn nausea.    Jimmy Thomson, Attending Physician

## 2024-01-09 NOTE — PROGRESS NOTE ADULT - SUBJECTIVE AND OBJECTIVE BOX
Patient is a 73y old  Female who presents with a chief complaint of UTI (09 Jan 2024 15:30)      Subjective:  INTERVAL HPI/OVERNIGHT EVENTS: Patient seen and examined at bedside.  Patient c/o nausea /vomiting, not tolerating PO  BUT NO ABD PAIN , LAST BM yesterday   MEDICATIONS  (STANDING):  cefTRIAXone   IVPB 1000 milliGRAM(s) IV Intermittent every 24 hours  dextrose 5% + lactated ringers. 1000 milliLiter(s) (80 mL/Hr) IV Continuous <Continuous>  dextrose 5%. 1000 milliLiter(s) (100 mL/Hr) IV Continuous <Continuous>  dextrose 5%. 1000 milliLiter(s) (50 mL/Hr) IV Continuous <Continuous>  dextrose 50% Injectable 25 Gram(s) IV Push once  dextrose 50% Injectable 12.5 Gram(s) IV Push once  dextrose 50% Injectable 25 Gram(s) IV Push once  famotidine  IVPB 20 milliGRAM(s) IV Intermittent daily  furosemide   Injectable 20 milliGRAM(s) IV Push two times a day  glucagon  Injectable 1 milliGRAM(s) IntraMuscular once  heparin   Injectable 5000 Unit(s) SubCutaneous every 8 hours  insulin glargine Injectable (LANTUS) 26 Unit(s) SubCutaneous at bedtime  insulin lispro (ADMELOG) corrective regimen sliding scale   SubCutaneous three times a day before meals  insulin lispro (ADMELOG) corrective regimen sliding scale   SubCutaneous at bedtime  levothyroxine Injectable 56 MICROGram(s) IV Push at bedtime  metoprolol tartrate Injectable 5 milliGRAM(s) IV Push every 6 hours    MEDICATIONS  (PRN):  dextrose Oral Gel 15 Gram(s) Oral once PRN Blood Glucose LESS THAN 70 milliGRAM(s)/deciliter  metoclopramide Injectable 5 milliGRAM(s) IV Push every 8 hours PRN nausea  ondansetron Injectable 4 milliGRAM(s) IV Push every 6 hours PRN Nausea and/or Vomiting  trimethobenzamide Injectable 200 milliGRAM(s) IntraMuscular every 8 hours PRN Nausea and/or Vomiting      Allergies    penicillins (Hives)  Ativan (Rash; Urticaria)    Intolerances        REVIEW OF SYSTEMS:  CONSTITUTIONAL: No fever or chills  HEENT:  No headache, no sore throat  RESPIRATORY: No cough or shortness of breath  CARDIOVASCULAR: No chest pain or palpitations  GASTROINTESTINAL:+ nausea, vomiting, or diarrhea      Objective:  Vital Signs Last 24 Hrs  T(C): 36.6 (09 Jan 2024 16:37), Max: 36.8 (08 Jan 2024 22:24)  T(F): 97.9 (09 Jan 2024 16:37), Max: 98.2 (08 Jan 2024 22:24)  HR: 64 (09 Jan 2024 16:37) (51 - 66)  BP: 122/72 (09 Jan 2024 16:37) (115/67 - 151/75)  BP(mean): --  RR: 16 (09 Jan 2024 16:37) (16 - 18)  SpO2: 95% (09 Jan 2024 16:37) (94% - 99%)    Parameters below as of 09 Jan 2024 13:05  Patient On (Oxygen Delivery Method): room air        GENERAL: NAD, lying in bed comfortably  HEAD:  Normocephalic  EYES:  conjunctiva and sclera clear  ENT: Moist mucous membranes  NECK: Supple  CHEST/LUNG: Clear to auscultation bilaterally; No rales or rhonchi; no wheezing. Unlabored respirations  HEART: Regular rate and rhythm; S1S2+  ABDOMEN: Bowel sounds present; Soft, Nontender, Nondistended.   EXTREMITIES:  + distal Peripheral Pulses;  No cyanosis, or edema  NERVOUS SYSTEM:  Alert & Oriented X3;  No gross focal deficits   MSK: moves all extremities  SKIN: No rashes     LABS:                        12.6   9.86  )-----------( 231      ( 09 Jan 2024 01:23 )             40.1     09 Jan 2024 01:23    137    |  105    |  53     ----------------------------<  155    3.8     |  28     |  2.30     Ca    9.1        09 Jan 2024 01:23    TPro  8.1    /  Alb  3.7    /  TBili  0.9    /  DBili  x      /  AST  13     /  ALT  14     /  AlkPhos  68     09 Jan 2024 01:23    PT/INR - ( 09 Jan 2024 01:23 )   PT: 12.4 sec;   INR: 1.06 ratio         PTT - ( 09 Jan 2024 01:23 )  PTT:30.9 sec  Urinalysis Basic - ( 09 Jan 2024 01:23 )    Color: x / Appearance: x / SG: x / pH: x  Gluc: 155 mg/dL / Ketone: x  / Bili: x / Urobili: x   Blood: x / Protein: x / Nitrite: x   Leuk Esterase: x / RBC: x / WBC x   Sq Epi: x / Non Sq Epi: x / Bacteria: x      CAPILLARY BLOOD GLUCOSE      POCT Blood Glucose.: 198 mg/dL (09 Jan 2024 11:24)        Culture - Urine (collected 01-08-24 @ 04:09)  Source: Clean Catch Clean Catch (Midstream)  Preliminary Report (01-09-24 @ 15:39):    >100,000 CFU/ml Klebsiella pneumoniae        RADIOLOGY & ADDITIONAL TESTS:    Personally reviewed.     Consultant(s) Notes Reviewed:  [x] YES  [ ] NO    Plan of care discussed with patient /family; all questions answered   Patient is a 73y old  Female who presents with a chief complaint of UTI (09 Jan 2024 15:30)      Subjective:  INTERVAL HPI/OVERNIGHT EVENTS: Patient seen and examined at bedside.  Patient c/o nausea /vomiting, not tolerating PO  BUT NO ABD PAIN , LAST BM yesterday   MEDICATIONS  (STANDING):  cefTRIAXone   IVPB 1000 milliGRAM(s) IV Intermittent every 24 hours  dextrose 5% + lactated ringers. 1000 milliLiter(s) (80 mL/Hr) IV Continuous <Continuous>  dextrose 5%. 1000 milliLiter(s) (100 mL/Hr) IV Continuous <Continuous>  dextrose 5%. 1000 milliLiter(s) (50 mL/Hr) IV Continuous <Continuous>  dextrose 50% Injectable 25 Gram(s) IV Push once  dextrose 50% Injectable 12.5 Gram(s) IV Push once  dextrose 50% Injectable 25 Gram(s) IV Push once  famotidine  IVPB 20 milliGRAM(s) IV Intermittent daily  furosemide   Injectable 20 milliGRAM(s) IV Push two times a day  glucagon  Injectable 1 milliGRAM(s) IntraMuscular once  heparin   Injectable 5000 Unit(s) SubCutaneous every 8 hours  insulin glargine Injectable (LANTUS) 26 Unit(s) SubCutaneous at bedtime  insulin lispro (ADMELOG) corrective regimen sliding scale   SubCutaneous three times a day before meals  insulin lispro (ADMELOG) corrective regimen sliding scale   SubCutaneous at bedtime  levothyroxine Injectable 56 MICROGram(s) IV Push at bedtime  metoprolol tartrate Injectable 5 milliGRAM(s) IV Push every 6 hours    MEDICATIONS  (PRN):  dextrose Oral Gel 15 Gram(s) Oral once PRN Blood Glucose LESS THAN 70 milliGRAM(s)/deciliter  metoclopramide Injectable 5 milliGRAM(s) IV Push every 8 hours PRN nausea  ondansetron Injectable 4 milliGRAM(s) IV Push every 6 hours PRN Nausea and/or Vomiting  trimethobenzamide Injectable 200 milliGRAM(s) IntraMuscular every 8 hours PRN Nausea and/or Vomiting      Allergies    penicillins (Hives)  Ativan (Rash; Urticaria)    Intolerances        REVIEW OF SYSTEMS:  CONSTITUTIONAL: No fever or chills  HEENT:  No headache, no sore throat  RESPIRATORY: No cough or shortness of breath  CARDIOVASCULAR: No chest pain or palpitations  GASTROINTESTINAL:+ nausea, vomiting, or diarrhea      Objective:  Vital Signs Last 24 Hrs  T(C): 36.6 (09 Jan 2024 16:37), Max: 36.8 (08 Jan 2024 22:24)  T(F): 97.9 (09 Jan 2024 16:37), Max: 98.2 (08 Jan 2024 22:24)  HR: 64 (09 Jan 2024 16:37) (51 - 66)  BP: 122/72 (09 Jan 2024 16:37) (115/67 - 151/75)  BP(mean): --  RR: 16 (09 Jan 2024 16:37) (16 - 18)  SpO2: 95% (09 Jan 2024 16:37) (94% - 99%)    Parameters below as of 09 Jan 2024 13:05  Patient On (Oxygen Delivery Method): room air        GENERAL: NAD, lying in bed comfortably  HEAD:  Normocephalic  EYES:  conjunctiva and sclera clear  ENT: Moist mucous membranes  NECK: Supple  CHEST/LUNG: Clear to auscultation bilaterally; No rales or rhonchi; no wheezing. Unlabored respirations  HEART: Regular rate and rhythm; S1S2+  ABDOMEN: Bowel sounds present; Soft, Nontender, Nondistended.   EXTREMITIES:  + distal Peripheral Pulses;  b/l le lymphadema  NERVOUS SYSTEM:  Alert & Oriented X3;  No gross focal deficits   MSK: moves all extremities  SKIN: No rashes     LABS:                        12.6   9.86  )-----------( 231      ( 09 Jan 2024 01:23 )             40.1     09 Jan 2024 01:23    137    |  105    |  53     ----------------------------<  155    3.8     |  28     |  2.30     Ca    9.1        09 Jan 2024 01:23    TPro  8.1    /  Alb  3.7    /  TBili  0.9    /  DBili  x      /  AST  13     /  ALT  14     /  AlkPhos  68     09 Jan 2024 01:23    PT/INR - ( 09 Jan 2024 01:23 )   PT: 12.4 sec;   INR: 1.06 ratio         PTT - ( 09 Jan 2024 01:23 )  PTT:30.9 sec  Urinalysis Basic - ( 09 Jan 2024 01:23 )    Color: x / Appearance: x / SG: x / pH: x  Gluc: 155 mg/dL / Ketone: x  / Bili: x / Urobili: x   Blood: x / Protein: x / Nitrite: x   Leuk Esterase: x / RBC: x / WBC x   Sq Epi: x / Non Sq Epi: x / Bacteria: x      CAPILLARY BLOOD GLUCOSE      POCT Blood Glucose.: 198 mg/dL (09 Jan 2024 11:24)        Culture - Urine (collected 01-08-24 @ 04:09)  Source: Clean Catch Clean Catch (Midstream)  Preliminary Report (01-09-24 @ 15:39):    >100,000 CFU/ml Klebsiella pneumoniae        RADIOLOGY & ADDITIONAL TESTS:    Personally reviewed.     Consultant(s) Notes Reviewed:  [x] YES  [ ] NO    Plan of care discussed with patient /family; all questions answered

## 2024-01-09 NOTE — CARE COORDINATION ASSESSMENT. - OTHER PERTINENT DISCHARGE PLANNING INFORMATION:
Met with patient at bedside to discuss the role of case management with verbalized understanding.  Needs unclear at present.  Patient admitted with UTi and is currently on IVAX.  Will remain available from a case management perspective.

## 2024-01-09 NOTE — H&P ADULT - PROBLEM SELECTOR PLAN 5
Chronic   - continue home statin Chronic   - will hold home eplerenone in setting of pt unable to tolerate PO   - continue Lasix 20mg IVP BID with hold parameters in place of torsemide 20mg PO BID   - DASH diet

## 2024-01-09 NOTE — PROGRESS NOTE ADULT - ASSESSMENT
74yo F with PMHx T2DM, HFrEF, HTN, hypothyroidism, LBBB, chronic lymphedema, CKD presents to the ED for nausea and vomiting. Admitted for UTI and inability to tolerate PO.   72yo F with PMHx T2DM, HFrEF, HTN, hypothyroidism, LBBB, chronic lymphedema, CKD presents to the ED for nausea and vomiting. Admitted for UTI and inability to tolerate PO.

## 2024-01-09 NOTE — H&P ADULT - NSHPSOCIALHISTORY_GEN_ALL_CORE
Tobacco:   EtOH:    Recreational drug use:   Lives with:   Ambulates:   ADLs: Tobacco: None   EtOH:  None  Recreational drug use: None  Lives with: Partner Peggi   Ambulates: cane   ADLs: independent

## 2024-01-09 NOTE — PROGRESS NOTE ADULT - PROBLEM SELECTOR PLAN 2
Pt presents to the ED for nausea and vomiting, unable to tolerate PO at home   - continue IVF   - tigan prn for nausea   - RVP ordered   - Clear liquid diet  check lipase, electrlytes , CT abd

## 2024-01-09 NOTE — PROGRESS NOTE ADULT - PROBLEM SELECTOR PLAN 3
Chronic   - continue home metoprolol with hold parameters; transitioned to Lopressor 5mg q6hrs for IV dosing as pt cannot tolerate PO   - HOLD home ASA as pt unable to tolerate PO, continue when able   - Monitor hemodynamics

## 2024-01-09 NOTE — ED PROVIDER NOTE - PROGRESS NOTE DETAILS
Dr. Mcdermott called back states he does not cover Dr. Wagner and anymore Dr. Taveras made aware will admit to Dr. Thomson for the morning.

## 2024-01-09 NOTE — H&P ADULT - NSHPPHYSICALEXAM_GEN_ALL_CORE
T(C): 36.7 (01-09-24 @ 04:04), Max: 36.8 (01-08-24 @ 22:24)  HR: 66 (01-09-24 @ 04:04) (51 - 66)  BP: 115/67 (01-09-24 @ 04:04) (115/67 - 124/63)  RR: 18 (01-09-24 @ 04:04) (18 - 18)  SpO2: 94% (01-09-24 @ 04:04) (94% - 99%)    GENERAL: patient appears well, no acute distress, appropriately interactive  EYES: sclera clear, no exudates  ENMT: oropharynx clear without erythema, no exudates, moist mucous membranes  NECK: supple, soft  LUNGS: good air entry bilaterally, clear to auscultation, symmetric breath sounds, no wheezing or rhonchi appreciated  HEART: soft S1/S2, regular rate and rhythm, no murmurs noted, no lower extremity edema  GASTROINTESTINAL: abdomen is soft, nontender, nondistended, normoactive bowel sounds  INTEGUMENT: good skin turgor, warm skin, appears well perfused  MUSCULOSKELETAL: no clubbing or cyanosis, no obvious deformity  NEUROLOGIC: awake, alert, oriented x3, good muscle tone in all 4 extremities  HEME/LYMPH: no obvious ecchymosis or petechiae T(C): 36.7 (01-09-24 @ 04:04), Max: 36.8 (01-08-24 @ 22:24)  HR: 66 (01-09-24 @ 04:04) (51 - 66)  BP: 115/67 (01-09-24 @ 04:04) (115/67 - 124/63)  RR: 18 (01-09-24 @ 04:04) (18 - 18)  SpO2: 94% (01-09-24 @ 04:04) (94% - 99%)    GENERAL: patient appears upset, dry heaving, appropriately interactive  EYES: sclera clear, no exudates  ENMT: oropharynx clear without erythema, no exudates, moist mucous membranes  NECK: supple, soft  LUNGS: good air entry bilaterally, clear to auscultation, symmetric breath sounds, no wheezing or rhonchi appreciated  HEART: soft S1/S2, regular rate and rhythm, no murmurs noted, +BL LE edema   GASTROINTESTINAL: abdomen is soft, nontender, nondistended, normoactive bowel sounds  INTEGUMENT: good skin turgor, warm skin, appears well perfused  MUSCULOSKELETAL: no clubbing or cyanosis, no obvious deformity  NEUROLOGIC: awake, alert, oriented x3, good muscle tone in all 4 extremities  HEME/LYMPH: no obvious ecchymosis or petechiae

## 2024-01-09 NOTE — ED ADULT NURSE NOTE - OBJECTIVE STATEMENT
pt 73 year old female presents to ED complaining of nausea and vomiting. pt alert. pt states was discharged pt 73 year old female presents to ED complaining of nausea and vomiting. pt alert. pt states was discharged yesterday for UTI, felt nausea when left the hospital, and did not resolve itself since leaving so pt returned.

## 2024-01-09 NOTE — CONSULT NOTE ADULT - ASSESSMENT
CKD 4 CKD 4  HTN  N/V  LE Edema    -Baseline Creatinine 2.1  -Renal function at baseline  -BP controlled  -Cont IVF  -Has chronic LE edema, but will hold diuretic at this time as she has had poor intake with N/V

## 2024-01-09 NOTE — PROGRESS NOTE ADULT - PROBLEM SELECTOR PLAN 1
Pt presents to the ED for UTI   - UA showed +nitrite, mod leuk est, many bacteria, +SC   - s/p Rocephin x1, 1L NS bolus x1, Zofran x1 in the ED   - lactate 1.6  - continue rocephin x3 days   - urine culture + E coli   - f/u BCx x2  - Monitor for leukocytosis  ID eval called

## 2024-01-09 NOTE — H&P ADULT - NSHPREVIEWOFSYSTEMS_GEN_ALL_CORE
CONSTITUTIONAL: denies fever, chills, fatigue, weakness  HEENT: denies blurred vision, sore throat  SKIN: denies new lesions, rash  CARDIOVASCULAR: denies chest pain, chest pressure, palpitations  RESPIRATORY: denies shortness of breath, cough, sputum production  GASTROINTESTINAL: denies nausea, vomiting, diarrhea, abdominal pain, melena or hematochezia  GENITOURINARY: denies dysuria, discharge  NEUROLOGICAL: denies numbness, headache, focal weakness  MUSCULOSKELETAL: denies new joint pain, muscle aches  HEMATOLOGIC: denies gross bleeding, bruising CONSTITUTIONAL: denies fever, chills, fatigue, weakness  HEENT: denies blurred vision, sore throat  SKIN: denies new lesions, rash  CARDIOVASCULAR: denies chest pain, chest pressure, palpitations  RESPIRATORY: denies shortness of breath, cough, sputum production  GASTROINTESTINAL: ADMITS nausea, vomiting, Denies diarrhea, abdominal pain, melena or hematochezia  GENITOURINARY: denies dysuria, discharge  NEUROLOGICAL: denies numbness, headache, focal weakness  MUSCULOSKELETAL: denies new joint pain, muscle aches  HEMATOLOGIC: denies gross bleeding, bruising

## 2024-01-09 NOTE — H&P ADULT - PROBLEM SELECTOR PLAN 4
Chronic   - continue home   - DASH diet Chronic   - Home regimen: lantus 37 units qhs + sliding scale   - will continue lantus 26 units at this time   - LDSS   - accuchecks   - Hypoglycemia protocol

## 2024-01-09 NOTE — H&P ADULT - PROBLEM SELECTOR PLAN 2
Chronic   - continue home with hold parameters  - Monitor hemodynamics Pt presents to the ED for nausea and vomiting, unable to tolerate PO at home   - continue IVF NS 55cc/hr for 12 hours   - tigan prn for nausea   - RVP ordered   - Clear liquid diet Pt presents to the ED for nausea and vomiting, unable to tolerate PO at home   - continue IVF NS 50cc/hr for 12 hours   - tigan prn for nausea   - RVP ordered   - Clear liquid diet

## 2024-01-09 NOTE — ED ADULT NURSE NOTE - NSFALLRISKINTERV_ED_ALL_ED
Assistance OOB with selected safe patient handling equipment if applicable/Assistance with ambulation/Communicate fall risk and risk factors to all staff, patient, and family/Monitor gait and stability/Provide patient with walking aids/Provide visual cue: yellow wristband, yellow gown, etc/Reinforce activity limits and safety measures with patient and family/Call bell, personal items and telephone in reach/Instruct patient to call for assistance before getting out of bed/chair/stretcher/Non-slip footwear applied when patient is off stretcher/[place] to call system/Physically safe environment - no spills, clutter or unnecessary equipment/Purposeful Proactive Rounding/Room/bathroom lighting operational, light cord in reach Assistance OOB with selected safe patient handling equipment if applicable/Assistance with ambulation/Communicate fall risk and risk factors to all staff, patient, and family/Monitor gait and stability/Provide patient with walking aids/Provide visual cue: yellow wristband, yellow gown, etc/Reinforce activity limits and safety measures with patient and family/Call bell, personal items and telephone in reach/Instruct patient to call for assistance before getting out of bed/chair/stretcher/Non-slip footwear applied when patient is off stretcher/[place] to call system/Physically safe environment - no spills, clutter or unnecessary equipment/Purposeful Proactive Rounding/Room/bathroom lighting operational, light cord in reach

## 2024-01-09 NOTE — H&P ADULT - HISTORY OF PRESENT ILLNESS
74yo F with PMHx T2DM, HFrEF, HTN, hypothyroidism, LBBB, chronic lymphedema, CKD presents to the ED for     ED course  Vitals: T 98.2, HR 66, /67, RR 18, SpO2 94% on RA  Significant labs: Cr 2.3, lactate 1.6  UA showed +nitrite, mod leuk est, many bacteria, +SC   Imaging: N/A  EKG:   In ED patient given: Rocephin x1, 1L NS bolus x1, Zofran x1    74yo F with PMHx T2DM, HFrEF, HTN, hypothyroidism, LBBB, chronic lymphedema, CKD presents to the ED for nausea. States that     ED course  Vitals: T 98.2, HR 66, /67, RR 18, SpO2 94% on RA  Significant labs: Cr 2.3, lactate 1.6  UA showed +nitrite, mod leuk est, many bacteria, +SC   Imaging: N/A  EKG:   In ED patient given: Rocephin x1, 1L NS bolus x1, Zofran x1    72yo F with PMHx T2DM, HFrEF, HTN, hypothyroidism, LBBB, chronic lymphedema, CKD presents to the ED for nausea. States that     ED course  Vitals: T 98.2, HR 66, /67, RR 18, SpO2 94% on RA  Significant labs: Cr 2.3, lactate 1.6  UA showed +nitrite, mod leuk est, many bacteria, +SC   Imaging: N/A  EKG:   In ED patient given: Rocephin x1, 1L NS bolus x1, Zofran x1    72yo F with PMHx T2DM, HFrEF, HTN, hypothyroidism, LBBB, chronic lymphedema, CKD presents to the ED for nausea and vomiting. Pt came to the ED yesterday for nausea and non-bloody emesis, was found to have a UTI, and sent home on Cipro. Pt unable to tolerate PO at home so she came back to the ED. Pt states that partner has been sick at home. No recent travel.  Pt recently admitted to [facility] fo e.coli bacteremia from Aug 11-15.   Of note, Pt additionally admitted in June with septic shock and E. coli bacteremia 2/2 left pyelo with hydro- no obtructive uropathy on CT. She was on pressors and given 10 days of iv rocephin.     ED course  Vitals: T 98.2, HR 66, /67, RR 18, SpO2 94% on RA  Significant labs: Cr 2.3, lactate 1.6  UA showed +nitrite, mod leuk est, many bacteria, +SC   Imaging: N/A  In ED patient given: Rocephin x1, 1L NS bolus x1, Zofran x1    74yo F with PMHx T2DM, HFrEF, HTN, hypothyroidism, LBBB, chronic lymphedema, CKD presents to the ED for nausea and vomiting. Pt came to the ED yesterday for nausea and non-bloody emesis, was found to have a UTI, and sent home on Cipro. Pt unable to tolerate PO at home so she came back to the ED. Pt states that partner has been sick at home. No recent travel.  Pt recently admitted to [facility] fo e.coli bacteremia from Aug 11-15.   Of note, Pt additionally admitted in June with septic shock and E. coli bacteremia 2/2 left pyelo with hydro- no obtructive uropathy on CT. She was on pressors and given 10 days of iv rocephin.     ED course  Vitals: T 98.2, HR 66, /67, RR 18, SpO2 94% on RA  Significant labs: Cr 2.3, lactate 1.6  UA showed +nitrite, mod leuk est, many bacteria, +SC   Imaging: N/A  In ED patient given: Rocephin x1, 1L NS bolus x1, Zofran x1

## 2024-01-09 NOTE — ED ADULT NURSE REASSESSMENT NOTE - NS ED NURSE REASSESS COMMENT FT1
pt received from triage. pt alert. pt in no acute distress. IV access gained, labs drawn and sent, meds given as per MAR. pt safety maintained.

## 2024-01-10 NOTE — PROGRESS NOTE ADULT - PROBLEM SELECTOR PLAN 8
Chronic, baseline Cr appears 3-4   - currently at baseline   - monitor CMP Chronic, baseline Cr appears 3-4   - currently at baseline   - monitor CMP  - Nephro Dr. Edwards recs appreciated

## 2024-01-10 NOTE — ED ADULT NURSE REASSESSMENT NOTE - NS ED NURSE REASSESS COMMENT FT1
Patient requested for plastic bag for her pajamas, jacket and shoes during this round for transport to the floor

## 2024-01-10 NOTE — CONSULT NOTE ADULT - SUBJECTIVE AND OBJECTIVE BOX
HPI:  74yo F with PMHx IDDM2, HFrEF, HTN, hypothyroidism, LBBB, chronic lymphedema hx of septic shock and Ecoli bacteremia sec Left pyelo with hydro 6/2023 who presented to the hospital with c/o nausea and vomiting. Pt was seen in ED 1/8 for nausea and non-bloody emesis, was found to have a UTI, and sent home on Cipro but she was unable to tolerate PO at home. No diarrhea Fever chills dysuria     Infectious Disease consult was called to evaluate pt and for antibiotic management.      Past Medical & Surgical Hx:  PAST MEDICAL & SURGICAL HISTORY:  Hypertension  Diabetes  Lymphedema  H/O left bundle branch block  History of left bundle branch block (LBBB)  Systolic heart failure, chronic  H/O cataract  2020  History of surgical removal of meniscus of knee  left in 1971  Frozen shoulder  2000  H/O Achilles tendon repair  lengthened bilaterally, 2000  Fractured skull  1968      Social History--  EtOH: denies   Tobacco: denies   Drug Use: denies     FAMILY HISTORY:  Family history of CVA  mom, age 57      Allergies  penicillins (Hives)  Ativan (Rash; Urticaria)    Intolerances  NONE    Home Medications:  aspirin 81 mg oral tablet: 1 tab(s) orally once a day (09 Jan 2024 08:51)  atorvastatin 80 mg oral tablet: 1 tab(s) orally once a day (09 Jan 2024 08:51)  eplerenone 25 mg oral tablet: 1 tab(s) orally once a day (09 Jan 2024 08:51)  ferrous sulfate 324 mg (65 mg elemental iron) oral tablet: 1 tab(s) orally 2 times a day (09 Jan 2024 08:49)  Lantus 100 units/mL subcutaneous solution: 37 unit(s) subcutaneous once a day (at bedtime) (09 Jan 2024 08:51)  levothyroxine 75 mcg (0.075 mg) oral tablet: 1 tab(s) orally once a day (09 Jan 2024 08:51)  metoprolol succinate 100 mg oral capsule, extended release: 1 cap(s) orally once a day (09 Jan 2024 08:50)  NovoLOG 100 units/mL subcutaneous solution: subcutaneous Sliding scale MDD 40units (09 Jan 2024 08:51)  torsemide 20 mg oral tablet: 1 tab(s) orally 2 times a day (09 Jan 2024 08:51)      Current Inpatient Medications :    ANTIBIOTICS:   cefTRIAXone   IVPB 1000 milliGRAM(s) IV Intermittent every 24 hours      OTHER RELEVANT MEDICATIONS :  dextrose 5%. 1000 milliLiter(s) IV Continuous <Continuous>  dextrose 5%. 1000 milliLiter(s) IV Continuous <Continuous>  dextrose 50% Injectable 25 Gram(s) IV Push once  dextrose 50% Injectable 25 Gram(s) IV Push once  dextrose 50% Injectable 12.5 Gram(s) IV Push once  dextrose Oral Gel 15 Gram(s) Oral once PRN  famotidine  IVPB 20 milliGRAM(s) IV Intermittent daily  furosemide   Injectable 20 milliGRAM(s) IV Push two times a day  glucagon  Injectable 1 milliGRAM(s) IntraMuscular once  heparin   Injectable 5000 Unit(s) SubCutaneous every 8 hours  insulin glargine Injectable (LANTUS) 26 Unit(s) SubCutaneous at bedtime  insulin lispro (ADMELOG) corrective regimen sliding scale   SubCutaneous three times a day before meals  insulin lispro (ADMELOG) corrective regimen sliding scale   SubCutaneous at bedtime  lactated ringers. 1000 milliLiter(s) IV Continuous <Continuous>  levothyroxine Injectable 56 MICROGram(s) IV Push at bedtime  triamcinolone 0.1% Cream 1 Application(s) Topical every 12 hours  trimethobenzamide Injectable 200 milliGRAM(s) IntraMuscular every 8 hours PRN        ROS:  CONSTITUTIONAL:  Negative fever or chills, feels well, good appetite  EYES:  Negative  blurry vision or double vision  CARDIOVASCULAR:  Negative for chest pain or palpitations  RESPIRATORY:  Negative for cough, wheezing, or SOB   GASTROINTESTINAL: + nausea, vomiting, No diarrhea, constipation, or abdominal pain  GENITOURINARY:  Negative frequency, urgency , dysuria or hematuria   NEUROLOGIC:  No headache, confusion, dizziness, lightheadedness  All other systems were reviewed and are negative        Physical Exam:  Vital Signs Last 24 Hrs  T(C): 36.8 (10 Manuel 2024 12:49), Max: 36.9 (09 Jan 2024 23:58)  T(F): 98.3 (10 Manuel 2024 12:49), Max: 98.4 (09 Jan 2024 23:58)  HR: 66 (10 Manuel 2024 12:49) (58 - 75)  BP: 116/61 (10 Manuel 2024 12:49) (116/61 - 126/90)  RR: 18 (10 Manuel 2024 12:49) (16 - 18)  SpO2: 95% (10 Manuel 2024 12:49) (94% - 99%)    Parameters below as of 10 Manuel 2024 12:49  Patient On (Oxygen Delivery Method): room air      General: no acute distress  Neck: supple, trachea midline  Lungs: clear, no wheeze/rhonchi  Cardiovascular: regular rate and rhythm, S1 S2  Abdomen: soft, nontender, ND, bowel sounds normal  Neurological:  alert and oriented x3  Skin: no rash  Extremities: + lymphadema    Labs:                         12.0   14.39 )-----------( 185      ( 10 Manuel 2024 05:55 )             37.5   01-10    134<L>  |  105  |  59<H>  ----------------------------<  201<H>  4.4   |  22  |  2.70<H>    Ca    8.6      10 Manuel 2024 05:55  Phos  4.5     01-09  Mg     2.5     01-10    TPro  7.4  /  Alb  3.2<L>  /  TBili  0.5  /  DBili  x   /  AST  19  /  ALT  14  /  AlkPhos  69  01-10    Urine Microscopic-Add On (NC) (01.08.24 @ 04:09)    Squamous Epithelial Cells: Present   Bacteria: Many /HPF   Red Blood Cell - Urine: 0-2 /HPF   White Blood Cell - Urine: 20-30 /HPF  Urine Appearance: Clear (01.08.24 @ 04:09)  Urinalysis (01.08.24 @ 04:09)    Glucose Qualitative, Urine: Negative mg/dL   Blood, Urine: Negative   pH Urine: 5.0   Color: Yellow   Urine Appearance: Clear   Bilirubin: Negative   Ketone - Urine: Negative mg/dL   Specific Gravity: 1.010   Protein, Urine: Negative mg/dL   Urobilinogen: 0.2 mg/dL   Nitrite: Positive   Leukocyte Esterase Concentration: Moderate      RECENT CULTURES:    Culture - Blood (collected 09 Jan 2024 01:23)  Source: .Blood Blood  Preliminary Report (10 Manuel 2024 07:02):    No growth at 24 hours    Culture - Blood (collected 09 Jan 2024 01:18)  Source: .Blood Blood  Preliminary Report (10 Manuel 2024 07:02):    No growth at 24 hours    Culture - Urine (collected 08 Jan 2024 04:09)  Source: Clean Catch Clean Catch (Midstream)  Final Report (10 Manuel 2024 13:54):    >100,000 CFU/ml Klebsiella pneumoniae  Organism: Klebsiella pneumoniae (10 Manuel 2024 13:54)  Organism: Klebsiella pneumoniae (10 Manuel 2024 13:54)      -  Levofloxacin: S <=0.5      -  Tobramycin: S <=2      -  Nitrofurantoin: S <=32 Should not be used to treat pyelonephritis      -  Aztreonam: S <=4      -  Gentamicin: S <=2      -  Cefazolin: S <=2 For uncomplicated UTI with K. pneumoniae, E. coli, or P. mirablis: CATRACHITA <=16 is sensitive and CATRACHITA >=32 is resistant. This also predicts results for oral agents cefaclor, cefdinir, cefpodoxime, cefprozil, cefuroxime axetil, cephalexin and locarbef for uncomplicated UTI. Note that some isolates may be susceptible to these agents while testing resistant to cefazolin.      -  Cefepime: S <=2      -  Piperacillin/Tazobactam: S <=8      -  Ciprofloxacin: S <=0.25      -  Imipenem: S <=1      -  Ceftriaxone: S <=1      -  Ampicillin: R <=8 These ampicillin results predict results for amoxicillin      Method Type: CATRACHITA      -  Meropenem: S <=1      -  Ampicillin/Sulbactam: S <=4/2      -  Cefoxitin: S <=8      -  Cefuroxime: S <=4      -  Amoxicillin/Clavulanic Acid: S <=8/4      -  Trimethoprim/Sulfamethoxazole: S <=0.5/9.5      -  Ertapenem: S <=0.5      RADIOLOGY & ADDITIONAL STUDIES:    ACC: [id number] EXAM:  CT ABDOMEN AND PELVIS   ORDERED BY:  TEMO CASTELAN     PROCEDURE DATE:  01/10/2024          INTERPRETATION:  CLINICAL INFORMATION: 73 years  Female with Nausea,   vomiting.    COMPARISON: Noncontrast CT abdomen and pelvis 8/11/2023    CONTRAST/COMPLICATIONS:  IV Contrast: NONE  Oral Contrast: NONE  Complications: None reported at time of study completion    PROCEDURE:  CT of the Abdomen and Pelvis was performed.  Sagittal and coronal reformats were performed.    FINDINGS:  LOWERCHEST: Small bilateral pleural effusions, larger on the right. Mild   cardiomegaly.    LIVER: Within normal limits.  BILE DUCTS: Normal caliber.  GALLBLADDER: Cholelithiasis.  SPLEEN: Within normal limits.  PANCREAS: Within normal limits.  ADRENALS: Within normal limits.  KIDNEYS/URETERS: Mild atrophy. No hydronephrosis. Bilateral nonspecific   perinephric fat stranding.    BLADDER: Within normal limits.  REPRODUCTIVE ORGANS: Unremarkable uterus.    BOWEL: No bowel obstruction. Unremarkable appendix. New right lower   quadrant metallic clip, possibly hemostatic clip in the cecum.  PERITONEUM: No ascites.  VESSELS: Atherosclerotic changes.  RETROPERITONEUM/LYMPH NODES: No lymphadenopathy. Periaortic lymph nodes   measuring up to 5 mm short axis.  ABDOMINAL WALL: Anasarca.  BONES: Bilateral L5 spondylolysis with grade 1 L5-S1 anterolisthesis.   Degenerative changes.    IMPRESSION:  Small bilateral pleural effusions, larger on the right. Mild cardiomegaly.    No acute intra-abdominal pathology.    New right lower quadrant metallic clip, possibly hemostatic clip in the   cecum. Correlate clinically.    Assessment :   74yo F with PMHx IDDM2, HFrEF, HTN, hypothyroidism, LBBB, chronic lymphedema hx of septic shock and Ecoli bacteremia sec Left pyelo with hydro 6/2023 admitted with nausea and vomiting poss sec UTI  ?Viral gastroenteritis  CT AP unremarkable  Has mild rash on neck and trunk - doubt drug rash     Plan :   Cont Rocephin  Trend temps and cbc  Fu cultures  Monitor rash  Anti emetic  Asp precautions  Pulm toileting    D/w Dr Davis    Continue with present regiment .  Approptiate use of antibiotics and adverse effects reviewed.      I have discussed the above plan of care with patient/ SO in detail. They expressed understanding of the treatment plan . Risks, benefits and alternatives discussed in detail. I have asked if they have any questions or concerns and appropriately addressed them to the best of my ability .      > 45 minutes spent in direct patient care reviewing  the notes, lab data/ imaging , discussion with multidisciplinary team. All questions were addressed and answered to the best of my capacity .    Thank you for allowing me to participate in the care of your patient .      Robby Clark MD  Infectious Disease  [phone]     HPI:  74yo F with PMHx IDDM2, HFrEF, HTN, hypothyroidism, LBBB, chronic lymphedema hx of septic shock and Ecoli bacteremia sec Left pyelo with hydro 6/2023 who presented to the hospital with c/o nausea and vomiting. Pt was seen in ED 1/8 for nausea and non-bloody emesis, was found to have a UTI, and sent home on Cipro but she was unable to tolerate PO at home. No diarrhea Fever chills dysuria     Infectious Disease consult was called to evaluate pt and for antibiotic management.      Past Medical & Surgical Hx:  PAST MEDICAL & SURGICAL HISTORY:  Hypertension  Diabetes  Lymphedema  H/O left bundle branch block  History of left bundle branch block (LBBB)  Systolic heart failure, chronic  H/O cataract  2020  History of surgical removal of meniscus of knee  left in 1971  Frozen shoulder  2000  H/O Achilles tendon repair  lengthened bilaterally, 2000  Fractured skull  1968      Social History--  EtOH: denies   Tobacco: denies   Drug Use: denies     FAMILY HISTORY:  Family history of CVA  mom, age 57      Allergies  penicillins (Hives)  Ativan (Rash; Urticaria)    Intolerances  NONE    Home Medications:  aspirin 81 mg oral tablet: 1 tab(s) orally once a day (09 Jan 2024 08:51)  atorvastatin 80 mg oral tablet: 1 tab(s) orally once a day (09 Jan 2024 08:51)  eplerenone 25 mg oral tablet: 1 tab(s) orally once a day (09 Jan 2024 08:51)  ferrous sulfate 324 mg (65 mg elemental iron) oral tablet: 1 tab(s) orally 2 times a day (09 Jan 2024 08:49)  Lantus 100 units/mL subcutaneous solution: 37 unit(s) subcutaneous once a day (at bedtime) (09 Jan 2024 08:51)  levothyroxine 75 mcg (0.075 mg) oral tablet: 1 tab(s) orally once a day (09 Jan 2024 08:51)  metoprolol succinate 100 mg oral capsule, extended release: 1 cap(s) orally once a day (09 Jan 2024 08:50)  NovoLOG 100 units/mL subcutaneous solution: subcutaneous Sliding scale MDD 40units (09 Jan 2024 08:51)  torsemide 20 mg oral tablet: 1 tab(s) orally 2 times a day (09 Jan 2024 08:51)      Current Inpatient Medications :    ANTIBIOTICS:   cefTRIAXone   IVPB 1000 milliGRAM(s) IV Intermittent every 24 hours      OTHER RELEVANT MEDICATIONS :  dextrose 5%. 1000 milliLiter(s) IV Continuous <Continuous>  dextrose 5%. 1000 milliLiter(s) IV Continuous <Continuous>  dextrose 50% Injectable 25 Gram(s) IV Push once  dextrose 50% Injectable 25 Gram(s) IV Push once  dextrose 50% Injectable 12.5 Gram(s) IV Push once  dextrose Oral Gel 15 Gram(s) Oral once PRN  famotidine  IVPB 20 milliGRAM(s) IV Intermittent daily  furosemide   Injectable 20 milliGRAM(s) IV Push two times a day  glucagon  Injectable 1 milliGRAM(s) IntraMuscular once  heparin   Injectable 5000 Unit(s) SubCutaneous every 8 hours  insulin glargine Injectable (LANTUS) 26 Unit(s) SubCutaneous at bedtime  insulin lispro (ADMELOG) corrective regimen sliding scale   SubCutaneous three times a day before meals  insulin lispro (ADMELOG) corrective regimen sliding scale   SubCutaneous at bedtime  lactated ringers. 1000 milliLiter(s) IV Continuous <Continuous>  levothyroxine Injectable 56 MICROGram(s) IV Push at bedtime  triamcinolone 0.1% Cream 1 Application(s) Topical every 12 hours  trimethobenzamide Injectable 200 milliGRAM(s) IntraMuscular every 8 hours PRN        ROS:  CONSTITUTIONAL:  Negative fever or chills, feels well, good appetite  EYES:  Negative  blurry vision or double vision  CARDIOVASCULAR:  Negative for chest pain or palpitations  RESPIRATORY:  Negative for cough, wheezing, or SOB   GASTROINTESTINAL: + nausea, vomiting, No diarrhea, constipation, or abdominal pain  GENITOURINARY:  Negative frequency, urgency , dysuria or hematuria   NEUROLOGIC:  No headache, confusion, dizziness, lightheadedness  All other systems were reviewed and are negative        Physical Exam:  Vital Signs Last 24 Hrs  T(C): 36.8 (10 Manuel 2024 12:49), Max: 36.9 (09 Jan 2024 23:58)  T(F): 98.3 (10 Manuel 2024 12:49), Max: 98.4 (09 Jan 2024 23:58)  HR: 66 (10 Manuel 2024 12:49) (58 - 75)  BP: 116/61 (10 Manuel 2024 12:49) (116/61 - 126/90)  RR: 18 (10 Manuel 2024 12:49) (16 - 18)  SpO2: 95% (10 Manuel 2024 12:49) (94% - 99%)    Parameters below as of 10 Manuel 2024 12:49  Patient On (Oxygen Delivery Method): room air      General: no acute distress  Neck: supple, trachea midline  Lungs: clear, no wheeze/rhonchi  Cardiovascular: regular rate and rhythm, S1 S2  Abdomen: soft, nontender, ND, bowel sounds normal  Neurological:  alert and oriented x3  Skin: no rash  Extremities: + lymphadema    Labs:                         12.0   14.39 )-----------( 185      ( 10 Manuel 2024 05:55 )             37.5   01-10    134<L>  |  105  |  59<H>  ----------------------------<  201<H>  4.4   |  22  |  2.70<H>    Ca    8.6      10 Manuel 2024 05:55  Phos  4.5     01-09  Mg     2.5     01-10    TPro  7.4  /  Alb  3.2<L>  /  TBili  0.5  /  DBili  x   /  AST  19  /  ALT  14  /  AlkPhos  69  01-10    Urine Microscopic-Add On (NC) (01.08.24 @ 04:09)    Squamous Epithelial Cells: Present   Bacteria: Many /HPF   Red Blood Cell - Urine: 0-2 /HPF   White Blood Cell - Urine: 20-30 /HPF  Urine Appearance: Clear (01.08.24 @ 04:09)  Urinalysis (01.08.24 @ 04:09)    Glucose Qualitative, Urine: Negative mg/dL   Blood, Urine: Negative   pH Urine: 5.0   Color: Yellow   Urine Appearance: Clear   Bilirubin: Negative   Ketone - Urine: Negative mg/dL   Specific Gravity: 1.010   Protein, Urine: Negative mg/dL   Urobilinogen: 0.2 mg/dL   Nitrite: Positive   Leukocyte Esterase Concentration: Moderate      RECENT CULTURES:    Culture - Blood (collected 09 Jan 2024 01:23)  Source: .Blood Blood  Preliminary Report (10 Manuel 2024 07:02):    No growth at 24 hours    Culture - Blood (collected 09 Jan 2024 01:18)  Source: .Blood Blood  Preliminary Report (10 Manuel 2024 07:02):    No growth at 24 hours    Culture - Urine (collected 08 Jan 2024 04:09)  Source: Clean Catch Clean Catch (Midstream)  Final Report (10 Manuel 2024 13:54):    >100,000 CFU/ml Klebsiella pneumoniae  Organism: Klebsiella pneumoniae (10 Manuel 2024 13:54)  Organism: Klebsiella pneumoniae (10 Manuel 2024 13:54)      -  Levofloxacin: S <=0.5      -  Tobramycin: S <=2      -  Nitrofurantoin: S <=32 Should not be used to treat pyelonephritis      -  Aztreonam: S <=4      -  Gentamicin: S <=2      -  Cefazolin: S <=2 For uncomplicated UTI with K. pneumoniae, E. coli, or P. mirablis: CATRACHITA <=16 is sensitive and CATRACHITA >=32 is resistant. This also predicts results for oral agents cefaclor, cefdinir, cefpodoxime, cefprozil, cefuroxime axetil, cephalexin and locarbef for uncomplicated UTI. Note that some isolates may be susceptible to these agents while testing resistant to cefazolin.      -  Cefepime: S <=2      -  Piperacillin/Tazobactam: S <=8      -  Ciprofloxacin: S <=0.25      -  Imipenem: S <=1      -  Ceftriaxone: S <=1      -  Ampicillin: R <=8 These ampicillin results predict results for amoxicillin      Method Type: CATRACHITA      -  Meropenem: S <=1      -  Ampicillin/Sulbactam: S <=4/2      -  Cefoxitin: S <=8      -  Cefuroxime: S <=4      -  Amoxicillin/Clavulanic Acid: S <=8/4      -  Trimethoprim/Sulfamethoxazole: S <=0.5/9.5      -  Ertapenem: S <=0.5      RADIOLOGY & ADDITIONAL STUDIES:    ACC: [id number] EXAM:  CT ABDOMEN AND PELVIS   ORDERED BY:  TEMO CASTELAN     PROCEDURE DATE:  01/10/2024          INTERPRETATION:  CLINICAL INFORMATION: 73 years  Female with Nausea,   vomiting.    COMPARISON: Noncontrast CT abdomen and pelvis 8/11/2023    CONTRAST/COMPLICATIONS:  IV Contrast: NONE  Oral Contrast: NONE  Complications: None reported at time of study completion    PROCEDURE:  CT of the Abdomen and Pelvis was performed.  Sagittal and coronal reformats were performed.    FINDINGS:  LOWERCHEST: Small bilateral pleural effusions, larger on the right. Mild   cardiomegaly.    LIVER: Within normal limits.  BILE DUCTS: Normal caliber.  GALLBLADDER: Cholelithiasis.  SPLEEN: Within normal limits.  PANCREAS: Within normal limits.  ADRENALS: Within normal limits.  KIDNEYS/URETERS: Mild atrophy. No hydronephrosis. Bilateral nonspecific   perinephric fat stranding.    BLADDER: Within normal limits.  REPRODUCTIVE ORGANS: Unremarkable uterus.    BOWEL: No bowel obstruction. Unremarkable appendix. New right lower   quadrant metallic clip, possibly hemostatic clip in the cecum.  PERITONEUM: No ascites.  VESSELS: Atherosclerotic changes.  RETROPERITONEUM/LYMPH NODES: No lymphadenopathy. Periaortic lymph nodes   measuring up to 5 mm short axis.  ABDOMINAL WALL: Anasarca.  BONES: Bilateral L5 spondylolysis with grade 1 L5-S1 anterolisthesis.   Degenerative changes.    IMPRESSION:  Small bilateral pleural effusions, larger on the right. Mild cardiomegaly.    No acute intra-abdominal pathology.    New right lower quadrant metallic clip, possibly hemostatic clip in the   cecum. Correlate clinically.    Assessment :   74yo F with PMHx IDDM2, HFrEF, HTN, hypothyroidism, LBBB, chronic lymphedema hx of septic shock and Ecoli bacteremia sec Left pyelo with hydro 6/2023 admitted with nausea and vomiting poss sec UTI  ?Viral gastroenteritis  CT AP unremarkable  Has mild rash on neck and trunk - doubt drug rash     Plan :   Cont Rocephin  Trend temps and cbc  Fu cultures  Monitor rash  Anti emetic  Asp precautions  Pulm toileting    D/w Dr Davis    Continue with present regiment .  Approptiate use of antibiotics and adverse effects reviewed.      I have discussed the above plan of care with patient/ SO in detail. They expressed understanding of the treatment plan . Risks, benefits and alternatives discussed in detail. I have asked if they have any questions or concerns and appropriately addressed them to the best of my ability .      > 45 minutes spent in direct patient care reviewing  the notes, lab data/ imaging , discussion with multidisciplinary team. All questions were addressed and answered to the best of my capacity .    Thank you for allowing me to participate in the care of your patient .      Robby Clark MD  Infectious Disease  [phone]

## 2024-01-10 NOTE — PROGRESS NOTE ADULT - PROBLEM SELECTOR PLAN 2
Pt presented to the ED for nausea and vomiting, unable to tolerate PO at home   - continue IVF   - Tigan prn for nausea   - Clear liquid diet  - RVP, lipase negative  - Follow up CT abd/pelvis Pt presented to the ED for nausea and vomiting, unable to tolerate PO at home   - continue IVF   - Tigan prn for nausea   - Clear liquid diet  - RVP, lipase negative  - CT abd/pelvis reviewed with patient: no acute intra-abdominal pathology; small bilateral pleural effusions, larger on the right, mild cardiomegaly; new right lower quadrant metallic clip, possibly hemostatic clip in the cecum (patient unsure of this, reports having ovary surgery years ago)

## 2024-01-10 NOTE — PROGRESS NOTE ADULT - PROBLEM SELECTOR PLAN 3
Chronic   - continue home metoprolol with hold parameters; transitioned to Lopressor 5mg q6hrs for IV dosing as pt cannot tolerate PO   - HOLD home ASA as pt unable to tolerate PO, continue when able   - Monitor hemodynamics Chronic   - continue home metoprolol with hold parameters; transitioned to Lopressor 5mg q6hrs for IV dosing as pt cannot tolerate PO   - HOLD home ASA as pt unable to tolerate PO, restart when able   - Monitor hemodynamics

## 2024-01-10 NOTE — CASE MANAGEMENT PROGRESS NOTE - NSCMPROGRESSNOTE_GEN_ALL_CORE
Discussed pt on rounds, pt remains acute, on IV Rocephin, IV Pepcid, IV Lasix. CM will continue to collaborate with interdisciplinary team and remain available to assist.

## 2024-01-10 NOTE — CASE MANAGEMENT PROGRESS NOTE - NSCMPROGRESSNOTE_GEN_ALL_CORE
CM consult for stairs at home noted and reviewed. CM to remain available and monitor for home care needs

## 2024-01-10 NOTE — PROGRESS NOTE ADULT - ASSESSMENT
Acute on CKD 4  HTN  N/V  LE Edema    -Baseline Creatinine 2.1  -Renal function worsened  -BP controlled  -Cont IVF  -Has chronic LE edema, but will hold diuretic at this time as she has had poor intake with N/V  -Daily chem; monitor trend in Cr for now; check bladder scan

## 2024-01-10 NOTE — PROGRESS NOTE ADULT - PROBLEM SELECTOR PLAN 1
- UA showed +nitrite, mod leuk esterase, many bacteria, +SC  - On Rocephin  - Prelim UCx 1/8 with >100k Klebsiella  - f/u BCx x2  - Monitor for leukocytosis  - ID Dr. Chisholm consulted  - IV was unable to be placed 1/9, midline - UA showed +nitrite, mod leuk esterase, many bacteria, +SC  - On Ceftriaxone  - Prelim UCx 1/8 with >100k Klebsiella  - f/u BCx x2  - Monitor for leukocytosis  - Discussed rash and management with ID Dr. Clark, recommends continuing CTX for now  - Plan for midline due to difficulty with IV's

## 2024-01-10 NOTE — PROGRESS NOTE ADULT - SUBJECTIVE AND OBJECTIVE BOX
Patient is a 73y old  Female who presents with a chief complaint of UTI (09 Jan 2024 16:52)       HPI:  74yo F with PMHx T2DM, HFrEF, HTN, hypothyroidism, LBBB, chronic lymphedema, CKD presents to the ED for nausea and vomiting. Pt came to the ED yesterday for nausea and non-bloody emesis, was found to have a UTI, and sent home on Cipro. Pt unable to tolerate PO at home so she came back to the ED. Pt states that partner has been sick at home. No recent travel.  Pt recently admitted to [facility] fo e.coli bacteremia from Aug 11-15.   Of note, Pt additionally admitted in June with septic shock and E. coli bacteremia 2/2 left pyelo with hydro- no obtructive uropathy on CT. She was on pressors and given 10 days of iv rocephin.   Sees Dr. Wilde for outpatient nephrologist.  Was previously on dialysis but was able to come off. Has had decreased PO intake and N/V. + sick contacts.       PAST MEDICAL & SURGICAL HISTORY:  Hypertension      Diabetes      Lymphedema      H/O left bundle branch block      History of left bundle branch block (LBBB)      Systolic heart failure, chronic      H/O cataract  2020      History of surgical removal of meniscus of knee  left in 1971      Frozen shoulder  2000      H/O Achilles tendon repair  lengthened bilaterally, 2000      Fractured skull  1968           FAMILY HISTORY:  Family history of CVA  mom, age 57    [place]    Social History:Non smoker    MEDICATIONS  (STANDING):  cefTRIAXone   IVPB 1000 milliGRAM(s) IV Intermittent every 24 hours  dextrose 5%. 1000 milliLiter(s) (50 mL/Hr) IV Continuous <Continuous>  dextrose 5%. 1000 milliLiter(s) (100 mL/Hr) IV Continuous <Continuous>  dextrose 50% Injectable 25 Gram(s) IV Push once  dextrose 50% Injectable 25 Gram(s) IV Push once  dextrose 50% Injectable 12.5 Gram(s) IV Push once  famotidine  IVPB 20 milliGRAM(s) IV Intermittent daily  furosemide   Injectable 20 milliGRAM(s) IV Push two times a day  glucagon  Injectable 1 milliGRAM(s) IntraMuscular once  heparin   Injectable 5000 Unit(s) SubCutaneous every 8 hours  insulin glargine Injectable (LANTUS) 26 Unit(s) SubCutaneous at bedtime  insulin lispro (ADMELOG) corrective regimen sliding scale   SubCutaneous three times a day before meals  insulin lispro (ADMELOG) corrective regimen sliding scale   SubCutaneous at bedtime  lactated ringers. 1000 milliLiter(s) (70 mL/Hr) IV Continuous <Continuous>  levothyroxine Injectable 56 MICROGram(s) IV Push at bedtime    MEDICATIONS  (PRN):  dextrose Oral Gel 15 Gram(s) Oral once PRN Blood Glucose LESS THAN 70 milliGRAM(s)/deciliter  trimethobenzamide Injectable 200 milliGRAM(s) IntraMuscular every 8 hours PRN Nausea and/or Vomiting   Meds reviewed    Allergies    penicillins (Hives)  Ativan (Rash; Urticaria)    Intolerances         REVIEW OF SYSTEMS:    Review of Systems:   Constitutional: + fatigue  HEENT: Denies headaches and dizziness  Respiratory: denies SOB, cough, or wheezing  Cardiovascular: denies CP, palpitations  Gastrointestinal: + N/V  Genitourinary: denies painful urination, increased frequency, urgency, or bloody urine  Skin: denies rashes or itching  Musculoskeletal: denies muscle aches, joint swelling  Neurologic: Denies generalized weakness, denies loss of sensation, numbness, or tingling      Vital Signs Last 24 Hrs  T(C): 36.8 (10 Manuel 2024 12:49), Max: 36.9 (09 Jan 2024 23:58)  T(F): 98.3 (10 Manuel 2024 12:49), Max: 98.4 (09 Jan 2024 23:58)  HR: 66 (10 Manuel 2024 12:49) (58 - 75)  BP: 116/61 (10 Manuel 2024 12:49) (116/61 - 126/90)  BP(mean): --  RR: 18 (10 Manuel 2024 12:49) (16 - 18)  SpO2: 95% (10 Manuel 2024 12:49) (94% - 99%)    Parameters below as of 10 Manuel 2024 12:49  Patient On (Oxygen Delivery Method): room air        PHYSICAL EXAM:    GENERAL: ill appearing  HEAD:  Atraumatic, Normocephalic  EYES: EOMI, conjunctiva and sclera clear  ENMT: No Drainage from nares, No drainage from ears  NERVOUS SYSTEM:  Awake and Alert  CHEST/LUNG: Clear to percussion bilaterally  EXTREMITIES:  ++Edema  SKIN: No rashes No obvious ecchymosis      LABS:                                   12.0   14.39 )-----------( 185      ( 10 Manuel 2024 05:55 )             37.5     01-10    134<L>  |  105  |  59<H>  ----------------------------<  201<H>  4.4   |  22  |  2.70<H>    Ca    8.6      10 Manuel 2024 05:55  Phos  4.5     01-09  Mg     2.5     01-10    TPro  7.4  /  Alb  3.2<L>  /  TBili  0.5  /  DBili  x   /  AST  19  /  ALT  14  /  AlkPhos  69  01-10    PT/INR - ( 10 Manuel 2024 08:37 )   PT: 13.0 sec;   INR: 1.11 ratio         PTT - ( 10 Manuel 2024 08:37 )  PTT:21.5 sec  Urinalysis Basic - ( 10 Manuel 2024 05:55 )    Color: x / Appearance: x / SG: x / pH: x  Gluc: 201 mg/dL / Ketone: x  / Bili: x / Urobili: x   Blood: x / Protein: x / Nitrite: x   Leuk Esterase: x / RBC: x / WBC x   Sq Epi: x / Non Sq Epi: x / Bacteria: x         Patient is a 73y old  Female who presents with a chief complaint of UTI (09 Jan 2024 16:52)       HPI:  74yo F with PMHx T2DM, HFrEF, HTN, hypothyroidism, LBBB, chronic lymphedema, CKD presents to the ED for nausea and vomiting. Pt came to the ED yesterday for nausea and non-bloody emesis, was found to have a UTI, and sent home on Cipro. Pt unable to tolerate PO at home so she came back to the ED. Pt states that partner has been sick at home. No recent travel.  Pt recently admitted to [facility] fo e.coli bacteremia from Aug 11-15.   Of note, Pt additionally admitted in June with septic shock and E. coli bacteremia 2/2 left pyelo with hydro- no obtructive uropathy on CT. She was on pressors and given 10 days of iv rocephin.   Sees Dr. Wilde for outpatient nephrologist.  Was previously on dialysis but was able to come off. Has had decreased PO intake and N/V. + sick contacts.       PAST MEDICAL & SURGICAL HISTORY:  Hypertension      Diabetes      Lymphedema      H/O left bundle branch block      History of left bundle branch block (LBBB)      Systolic heart failure, chronic      H/O cataract  2020      History of surgical removal of meniscus of knee  left in 1971      Frozen shoulder  2000      H/O Achilles tendon repair  lengthened bilaterally, 2000      Fractured skull  1968           FAMILY HISTORY:  Family history of CVA  mom, age 57    [place]    Social History:Non smoker    MEDICATIONS  (STANDING):  cefTRIAXone   IVPB 1000 milliGRAM(s) IV Intermittent every 24 hours  dextrose 5%. 1000 milliLiter(s) (50 mL/Hr) IV Continuous <Continuous>  dextrose 5%. 1000 milliLiter(s) (100 mL/Hr) IV Continuous <Continuous>  dextrose 50% Injectable 25 Gram(s) IV Push once  dextrose 50% Injectable 25 Gram(s) IV Push once  dextrose 50% Injectable 12.5 Gram(s) IV Push once  famotidine  IVPB 20 milliGRAM(s) IV Intermittent daily  furosemide   Injectable 20 milliGRAM(s) IV Push two times a day  glucagon  Injectable 1 milliGRAM(s) IntraMuscular once  heparin   Injectable 5000 Unit(s) SubCutaneous every 8 hours  insulin glargine Injectable (LANTUS) 26 Unit(s) SubCutaneous at bedtime  insulin lispro (ADMELOG) corrective regimen sliding scale   SubCutaneous three times a day before meals  insulin lispro (ADMELOG) corrective regimen sliding scale   SubCutaneous at bedtime  lactated ringers. 1000 milliLiter(s) (70 mL/Hr) IV Continuous <Continuous>  levothyroxine Injectable 56 MICROGram(s) IV Push at bedtime    MEDICATIONS  (PRN):  dextrose Oral Gel 15 Gram(s) Oral once PRN Blood Glucose LESS THAN 70 milliGRAM(s)/deciliter  trimethobenzamide Injectable 200 milliGRAM(s) IntraMuscular every 8 hours PRN Nausea and/or Vomiting   Meds reviewed    Allergies    penicillins (Hives)  Ativan (Rash; Urticaria)    Intolerances         REVIEW OF SYSTEMS:    Review of Systems:   Constitutional: + fatigue  HEENT: Denies headaches and dizziness  Respiratory: denies SOB, cough, or wheezing  Cardiovascular: denies CP, palpitations  Gastrointestinal: + N/V  Genitourinary: denies painful urination, increased frequency, urgency, or bloody urine  Skin: denies rashes or itching  Musculoskeletal: denies muscle aches, joint swelling  Neurologic: Denies generalized weakness, denies loss of sensation, numbness, or tingling      Vital Signs Last 24 Hrs  T(C): 36.8 (10 Manuel 2024 12:49), Max: 36.9 (09 Jan 2024 23:58)  T(F): 98.3 (10 Manuel 2024 12:49), Max: 98.4 (09 Jan 2024 23:58)  HR: 66 (10 Manuel 2024 12:49) (58 - 75)  BP: 116/61 (10 Manuel 2024 12:49) (116/61 - 126/90)  BP(mean): --  RR: 18 (10 Maneul 2024 12:49) (16 - 18)  SpO2: 95% (10 Manuel 2024 12:49) (94% - 99%)    Parameters below as of 10 Manuel 2024 12:49  Patient On (Oxygen Delivery Method): room air        PHYSICAL EXAM:    GENERAL: ill appearing  HEAD:  Atraumatic, Normocephalic  EYES: EOMI, conjunctiva and sclera clear  ENMT: No Drainage from nares, No drainage from ears  NERVOUS SYSTEM:  Awake and Alert  CHEST/LUNG: Clear to percussion bilaterally  EXTREMITIES:  ++Edema  SKIN: No rashes No obvious ecchymosis      LABS:                                   12.0   14.39 )-----------( 185      ( 10 Manuel 2024 05:55 )             37.5     01-10    134<L>  |  105  |  59<H>  ----------------------------<  201<H>  4.4   |  22  |  2.70<H>    Ca    8.6      10 Manuel 2024 05:55  Phos  4.5     01-09  Mg     2.5     01-10    TPro  7.4  /  Alb  3.2<L>  /  TBili  0.5  /  DBili  x   /  AST  19  /  ALT  14  /  AlkPhos  69  01-10    PT/INR - ( 10 Manuel 2024 08:37 )   PT: 13.0 sec;   INR: 1.11 ratio         PTT - ( 10 Manuel 2024 08:37 )  PTT:21.5 sec  Urinalysis Basic - ( 10 Manuel 2024 05:55 )    Color: x / Appearance: x / SG: x / pH: x  Gluc: 201 mg/dL / Ketone: x  / Bili: x / Urobili: x   Blood: x / Protein: x / Nitrite: x   Leuk Esterase: x / RBC: x / WBC x   Sq Epi: x / Non Sq Epi: x / Bacteria: x

## 2024-01-10 NOTE — PATIENT PROFILE ADULT - FALL HARM RISK - RISK INTERVENTIONS
Assistance OOB with selected safe patient handling equipment/Assistance with ambulation/Communicate Fall Risk and Risk Factors to all staff, patient, and family/Discuss with provider need for PT consult/Monitor gait and stability/Provide patient with walking aids - walker, cane, crutches/Reinforce activity limits and safety measures with patient and family/Visual Cue: Yellow wristband/Bed in lowest position, wheels locked, appropriate side rails in place/Call bell, personal items and telephone in reach/Instruct patient to call for assistance before getting out of bed or chair/Non-slip footwear when patient is out of bed/[place] to call system/Physically safe environment - no spills, clutter or unnecessary equipment/Purposeful Proactive Rounding/Room/bathroom lighting operational, light cord in reach Assistance OOB with selected safe patient handling equipment/Assistance with ambulation/Communicate Fall Risk and Risk Factors to all staff, patient, and family/Discuss with provider need for PT consult/Monitor gait and stability/Provide patient with walking aids - walker, cane, crutches/Reinforce activity limits and safety measures with patient and family/Visual Cue: Yellow wristband/Bed in lowest position, wheels locked, appropriate side rails in place/Call bell, personal items and telephone in reach/Instruct patient to call for assistance before getting out of bed or chair/Non-slip footwear when patient is out of bed/[place] to call system/Physically safe environment - no spills, clutter or unnecessary equipment/Purposeful Proactive Rounding/Room/bathroom lighting operational, light cord in reach

## 2024-01-10 NOTE — PATIENT PROFILE ADULT - NSPROHMDIABETBLDGLCTARGETFT_GEN_A_NUR
Anesthesia ROS/Med Hx    Overall Review:  EKG was reviewed     Anesthetic Complication History:  Patient does not have a history of anesthetic complications      Pulmonary Review:    Positive for sleep apnea - CPAP  Positive for asthma, well controlled  Negative for recent URI   The patient is not a smoker.    Neuro/Psych Review:    Positive for psychiatric history - Depression    Cardiovascular Review:  Exercise tolerance: good (>4 METS)  Negative for past MI  Negative for CAD  Negative for angina    Negative for dysrhythmias  Positive for REINOSO/ SOB  Positive for hypertension (Takes HCTZ -- K+ = 4.2)  Positive for hyperlipidemia    GI/HEPATIC/RENAL Review:    Positive for GERD    End/Other Review:    Positive for obesity class II - 35.00 - 39.99  Additional Results:     ALLERGIES:   -- Eye Drops -- Other (See Comments)    --  Cortisone eye drops- Pt couldn't see when he uses             these drops        Physical Exam     Airway   Mallampati: II  TM Distance: >3 FB  Neck ROM: Full  TMJ Mobility: Good    Cardiovascular  Cardiovascular exam normal    General Assessment  General Assessment: Alert and oriented and No acute distress    Dental Exam  Dental exam normal    Pulmonary Exam  Pulmonary exam normal    Abdominal Exam    Patient Demonstrates:  Obese      Anesthesia Plan:    ASA Status: 2  Anesthesia Type: General    Induction: Intravenous  Preferred Airway Type: ETT  Maintenance: Inhalational    Post-op Pain Management: Per Surgeon      Checklist  Reviewed: Past Med History, Allergies, Patient Summary, Medications, Problem list, NPO Status, EKG and Lab Results  Consent/Risks Discussed Statement:  The proposed anesthetic plan, including its risks and benefits, have been discussed with the Patient and Spouse along with the risks and benefits of alternatives. Questions were encouraged and answered and the patient and/or representative understands and agrees to proceed.        I discussed with the patient (and/or  125 patient's legal representative) the risks and benefits of the proposed anesthesia plan, General, which may include services performed by other anesthesia providers.    Alternative anesthesia plans, if available, were reviewed with the patient (and/or patient's legal representative). Discussion has been held with the patient (and/or patient's legal representative) regarding risks of anesthesia, which include depressed breathing, nausea, vomiting and sore throat and emergent situations that may require change in anesthesia plan.    The patient (and/or patient's legal representative) has indicated understanding, his/her questions have been answered, and he/she wishes to proceed with the planned anesthetic.      Blood Products: Not Anticipated

## 2024-01-10 NOTE — PATIENT PROFILE ADULT - FUNCTIONAL ASSESSMENT - BASIC MOBILITY 6.
2-calculated by average/Not able to assess (calculate score using [facility] averaging method) 2-calculated by average/Not able to assess (calculate score using [facility] averaging method)

## 2024-01-11 NOTE — PROGRESS NOTE ADULT - PROBLEM SELECTOR PLAN 2
Pt presented to the ED for nausea and vomiting, unable to tolerate PO    - continue IVF   - Reglan IV prn for nausea   - Clear liquid diet  - RVP, lipase negative  - CT abd/pelvis reviewed no acute intra-abdominal pathology; small bilateral pleural effusions, larger on the right, mild cardiomegaly; new right lower quadrant metallic clip, possibly hemostatic clip in the cecum (patient unsure of this, reports having ovary surgery years ago)

## 2024-01-11 NOTE — PROGRESS NOTE ADULT - ASSESSMENT
72yo F with PMHx T2DM, HFrEF, HTN, hypothyroidism, LBBB, chronic lymphedema, CKD presents to the ED for nausea and vomiting. Admitted for UTI and inability to tolerate PO.   74yo F with PMHx T2DM, HFrEF, HTN, hypothyroidism, LBBB, chronic lymphedema, CKD presents to the ED for nausea and vomiting. Admitted for UTI and inability to tolerate PO.

## 2024-01-11 NOTE — PROGRESS NOTE ADULT - ASSESSMENT
Acute on CKD 4  HTN  N/V  LE Edema  Hyponatremia    -Baseline Creatinine 2.1  -Renal function worsened  -BP controlled  -Cont IVF  -Has chronic LE edema, but will hold diuretic at this time as she has had poor intake with N/V  -Daily chem; monitor trend in Cr for now; check bladder scan  -Increased IVF and change to NS with hyponatremia  -DC lasix for now, monitor respiratory status

## 2024-01-11 NOTE — PROGRESS NOTE ADULT - SUBJECTIVE AND OBJECTIVE BOX
Patient is a 73y old  Female who presents with a chief complaint of UTI (10 Manuel 2024 14:27)      INTERVAL HPI/OVERNIGHT EVENTS: Pt seen and examined bedside,  c/o nausea and vomiting not tolerating PO. Denies any CP, Fever, Chills SOB or abd pain.    MEDICATIONS  (STANDING):  dextrose 5%. 1000 milliLiter(s) (100 mL/Hr) IV Continuous <Continuous>  dextrose 5%. 1000 milliLiter(s) (50 mL/Hr) IV Continuous <Continuous>  dextrose 50% Injectable 25 Gram(s) IV Push once  dextrose 50% Injectable 12.5 Gram(s) IV Push once  dextrose 50% Injectable 25 Gram(s) IV Push once  famotidine  IVPB 20 milliGRAM(s) IV Intermittent daily  furosemide   Injectable 20 milliGRAM(s) IV Push two times a day  glucagon  Injectable 1 milliGRAM(s) IntraMuscular once  heparin   Injectable 5000 Unit(s) SubCutaneous every 8 hours  insulin glargine Injectable (LANTUS) 26 Unit(s) SubCutaneous at bedtime  insulin lispro (ADMELOG) corrective regimen sliding scale   SubCutaneous at bedtime  insulin lispro (ADMELOG) corrective regimen sliding scale   SubCutaneous three times a day before meals  lactated ringers. 1000 milliLiter(s) (75 mL/Hr) IV Continuous <Continuous>  levothyroxine Injectable 56 MICROGram(s) IV Push at bedtime  nystatin Powder 1 Application(s) Topical three times a day  triamcinolone 0.1% Cream 1 Application(s) Topical every 12 hours    MEDICATIONS  (PRN):  dextrose Oral Gel 15 Gram(s) Oral once PRN Blood Glucose LESS THAN 70 milliGRAM(s)/deciliter  metoclopramide 10 milliGRAM(s) Oral every 8 hours PRN N/V  trimethobenzamide Injectable 200 milliGRAM(s) IntraMuscular every 8 hours PRN Nausea and/or Vomiting      Allergies    penicillins (Hives)  Ativan (Rash; Urticaria)    Intolerances        REVIEW OF SYSTEMS:  CONSTITUTIONAL: No fever or chills  HEENT:  No headache, no sore throat  RESPIRATORY: No cough, wheezing, or shortness of breath  CARDIOVASCULAR: No chest pain, palpitations, or leg swelling  GASTROINTESTINAL: No abd pain, +nausea, vomiting, no diarrhea  GENITOURINARY: No dysuria, frequency, or hematuria  NEUROLOGICAL: no focal weakness or dizziness  MUSCULOSKELETAL: no myalgias     Vital Signs Last 24 Hrs  T(C): 36.9 (11 Jan 2024 05:35), Max: 36.9 (11 Jan 2024 05:35)  T(F): 98.4 (11 Jan 2024 05:35), Max: 98.4 (11 Jan 2024 05:35)  HR: 64 (11 Jan 2024 05:35) (60 - 66)  BP: 118/70 (11 Jan 2024 05:35) (116/61 - 130/64)  BP(mean): --  RR: 18 (11 Jan 2024 05:35) (18 - 18)  SpO2: 92% (11 Jan 2024 05:35) (92% - 95%)    Parameters below as of 11 Jan 2024 05:35  Patient On (Oxygen Delivery Method): room air        PHYSICAL EXAM:  GENERAL: NAD  HEENT:  EOMI, moist mucous membranes  CHEST/LUNG:  CTA b/l, no rales, wheezes, or rhonchi  HEART:  RRR, S1, S2  ABDOMEN:  BS+, soft, nontender, nondistended  EXTREMITIES: no edema, cyanosis, or calf tenderness  NERVOUS SYSTEM: AA&Ox3    LABS:                        11.8   9.53  )-----------( 206      ( 11 Jan 2024 07:55 )             37.9     CBC Full  -  ( 11 Jan 2024 07:55 )  WBC Count : 9.53 K/uL  Hemoglobin : 11.8 g/dL  Hematocrit : 37.9 %  Platelet Count - Automated : 206 K/uL  Mean Cell Volume : 90.7 fl  Mean Cell Hemoglobin : 28.2 pg  Mean Cell Hemoglobin Concentration : 31.1 gm/dL  Auto Neutrophil # : 7.50 K/uL  Auto Lymphocyte # : 0.52 K/uL  Auto Monocyte # : 0.96 K/uL  Auto Eosinophil # : 0.41 K/uL  Auto Basophil # : 0.07 K/uL  Auto Neutrophil % : 78.7 %  Auto Lymphocyte % : 5.5 %  Auto Monocyte % : 10.1 %  Auto Eosinophil % : 4.3 %  Auto Basophil % : 0.7 %    11 Jan 2024 07:55    133    |  103    |  64     ----------------------------<  177    4.1     |  25     |  3.00     Ca    8.9        11 Jan 2024 07:55    TPro  7.1    /  Alb  3.1    /  TBili  0.4    /  DBili  x      /  AST  15     /  ALT  13     /  AlkPhos  60     11 Jan 2024 07:55    PT/INR - ( 10 Manuel 2024 08:37 )   PT: 13.0 sec;   INR: 1.11 ratio         PTT - ( 10 Manuel 2024 08:37 )  PTT:21.5 sec  Urinalysis Basic - ( 11 Jan 2024 07:55 )    Color: x / Appearance: x / SG: x / pH: x  Gluc: 177 mg/dL / Ketone: x  / Bili: x / Urobili: x   Blood: x / Protein: x / Nitrite: x   Leuk Esterase: x / RBC: x / WBC x   Sq Epi: x / Non Sq Epi: x / Bacteria: x      CAPILLARY BLOOD GLUCOSE      POCT Blood Glucose.: 163 mg/dL (11 Jan 2024 08:25)  POCT Blood Glucose.: 193 mg/dL (10 Manuel 2024 22:12)  POCT Blood Glucose.: 162 mg/dL (10 Manuel 2024 17:35)  POCT Blood Glucose.: 208 mg/dL (10 Manuel 2024 12:28)        Culture - Blood (collected 01-09-24 @ 01:23)  Source: .Blood Blood  Preliminary Report (01-11-24 @ 07:02):    No growth at 48 Hours    Culture - Blood (collected 01-09-24 @ 01:18)  Source: .Blood Blood  Preliminary Report (01-11-24 @ 07:02):    No growth at 48 Hours    Culture - Urine (collected 01-08-24 @ 04:09)  Source: Clean Catch Clean Catch (Midstream)  Final Report (01-10-24 @ 13:54):    >100,000 CFU/ml Klebsiella pneumoniae  Organism: Klebsiella pneumoniae (01-10-24 @ 13:54)  Organism: Klebsiella pneumoniae (01-10-24 @ 13:54)      -  Levofloxacin: S <=0.5      -  Tobramycin: S <=2      -  Nitrofurantoin: S <=32 Should not be used to treat pyelonephritis      -  Aztreonam: S <=4      -  Gentamicin: S <=2      -  Cefazolin: S <=2 For uncomplicated UTI with K. pneumoniae, E. coli, or P. mirablis: CATRACHITA <=16 is sensitive and CATRACHITA >=32 is resistant. This also predicts results for oral agents cefaclor, cefdinir, cefpodoxime, cefprozil, cefuroxime axetil, cephalexin and locarbef for uncomplicated UTI. Note that some isolates may be susceptible to these agents while testing resistant to cefazolin.      -  Cefepime: S <=2      -  Piperacillin/Tazobactam: S <=8      -  Ciprofloxacin: S <=0.25      -  Imipenem: S <=1      -  Ceftriaxone: S <=1      -  Ampicillin: R <=8 These ampicillin results predict results for amoxicillin      Method Type: CATRACHITA      -  Meropenem: S <=1      -  Ampicillin/Sulbactam: S <=4/2      -  Cefoxitin: S <=8      -  Cefuroxime: S <=4      -  Amoxicillin/Clavulanic Acid: S <=8/4      -  Trimethoprim/Sulfamethoxazole: S <=0.5/9.5      -  Ertapenem: S <=0.5        RADIOLOGY & ADDITIONAL TESTS:    Personally reviewed.     Consultant(s) Notes Reviewed:  [x] YES  [ ] NO    Care Discussed with [x] Consultants  [x] Patient  [ ] Family  [ ]      [ ] Other; RN  DVT ppx

## 2024-01-11 NOTE — PROGRESS NOTE ADULT - PROBLEM SELECTOR PLAN 3
Chronic   - continue home metoprolol with hold parameters; transitioned to Lopressor 5mg q6hrs for IV dosing as pt cannot tolerate PO   - HOLD home ASA as pt unable to tolerate PO, restart when able   - Monitor hemodynamics

## 2024-01-11 NOTE — PROGRESS NOTE ADULT - TIME BILLING
I personally conducted a physical examination of the patient. I personally gathered the patient's history. I personally discussed the plan of care with the patient. The questions and concerns were addressed to the best of my ability. The patient is in agreement with the listed treatment plan.
direct patient care including but not limited to reviewing chart, medications ,laboratory data, imaging reports, discussion of plan of care with consultants on the case, coordination of care with multidisciplinary team involved in the case and discussion of plan with patient.  Patient agreeable to plan of care and verbalized understanding the anticipated hospital course and treatment plan.
direct patient care including but not limited to reviewing chart, medications ,laboratory data, imaging reports, discussion of plan of care with consultants on the case, coordination of care with multidisciplinary team involved in the case and discussion of plan with patient.  Patient agreeable to plan of care and verbalized understanding the anticipated hospital course and treatment plan.

## 2024-01-11 NOTE — PROGRESS NOTE ADULT - PROBLEM SELECTOR PLAN 1
- UA showed +nitrite, mod leuk esterase, many bacteria, +SC  - On Ceftriaxone  - UCx 1/10 with >100k Klebsiella S to ceftriaxone  - f/u BCx x2  - Monitor for leukocytosis  - ID Dr. Clark, recommends continuing CTX for now  -  S/P midline due to difficulty with IV's

## 2024-01-11 NOTE — PROGRESS NOTE ADULT - PROBLEM SELECTOR PLAN 8
Chronic, baseline Cr appears 3-4   - currently at baseline   - monitor CMP  - Nephro Dr. Edwards recs appreciated

## 2024-01-11 NOTE — PROGRESS NOTE ADULT - SUBJECTIVE AND OBJECTIVE BOX
EVELYN LOPEZ is a 73yFemale , patient examined and chart reviewed.     INTERVAL HPI/ OVERNIGHT EVENTS:   Feeling better. Afebrile.  Nausea improving.    PAST MEDICAL & SURGICAL HISTORY:  Hypertension  Diabetes  Lymphedema  H/O left bundle branch block  History of left bundle branch block (LBBB)  Systolic heart failure, chronic  H/O cataract  2020  History of surgical removal of meniscus of knee  left in 1971  Frozen shoulder  2000  H/O Achilles tendon repair  lengthened bilaterally, 2000  Fractured skull  1968      For details regarding the patient's social history, family history, and other miscellaneous elements, please refer the initial infectious diseases consultation and/or the admitting history and physical examination for this admission.      ROS:  CONSTITUTIONAL:  Negative fever or chills  EYES:  Negative  blurry vision or double vision  CARDIOVASCULAR:  Negative for chest pain or palpitations  RESPIRATORY:  Negative for cough, wheezing, or SOB   GASTROINTESTINAL:  Negative for nausea, vomiting, diarrhea, constipation, or abdominal pain  GENITOURINARY:  Negative frequency, urgency or dysuria  NEUROLOGIC:  No headache, confusion, dizziness, lightheadedness  All other systems were reviewed and are negative     penicillins (Hives)  Ativan (Rash; Urticaria)      Current inpatient medications :    ANTIBIOTICS/RELEVANT:      dextrose 5%. 1000 milliLiter(s) IV Continuous <Continuous>  dextrose 5%. 1000 milliLiter(s) IV Continuous <Continuous>  dextrose 50% Injectable 25 Gram(s) IV Push once  dextrose 50% Injectable 25 Gram(s) IV Push once  dextrose 50% Injectable 12.5 Gram(s) IV Push once  dextrose Oral Gel 15 Gram(s) Oral once PRN  famotidine  IVPB 20 milliGRAM(s) IV Intermittent daily  glucagon  Injectable 1 milliGRAM(s) IntraMuscular once  heparin   Injectable 5000 Unit(s) SubCutaneous every 8 hours  insulin glargine Injectable (LANTUS) 26 Unit(s) SubCutaneous at bedtime  insulin lispro (ADMELOG) corrective regimen sliding scale   SubCutaneous three times a day before meals  insulin lispro (ADMELOG) corrective regimen sliding scale   SubCutaneous at bedtime  levothyroxine Injectable 56 MICROGram(s) IV Push at bedtime  metoclopramide 10 milliGRAM(s) Oral every 8 hours PRN  nystatin Powder 1 Application(s) Topical three times a day  sodium chloride 0.9%. 1000 milliLiter(s) IV Continuous <Continuous>  triamcinolone 0.1% Cream 1 Application(s) Topical every 12 hours  trimethobenzamide Injectable 200 milliGRAM(s) IntraMuscular every 8 hours PRN      Objective:    01-11 @ 07:01  -  01-11 @ 15:38  --------------------------------------------------------  IN: 0 mL / OUT: 150 mL / NET: -150 mL      T(C): 36.7 (01-11-24 @ 12:28), Max: 36.9 (01-11-24 @ 05:35)  HR: 58 (01-11-24 @ 12:28) (58 - 64)  BP: 155/70 (01-11-24 @ 12:28) (118/70 - 155/70)  RR: 18 (01-11-24 @ 12:28) (18 - 18)  SpO2: 91% (01-11-24 @ 12:28) (91% - 93%)  Wt(kg): --      Physical Exam:  General:  no acute distress  Neck: supple, trachea midline  Lungs: clear, no wheeze/rhonchi  Cardiovascular: regular rate and rhythm, S1 S2  Abdomen: soft, nontender,  bowel sounds normal  Neurological: alert and oriented x3  Skin: no rash  Extremities: + edema      LABS:                        11.8   9.53  )-----------( 206      ( 11 Jan 2024 07:55 )             37.9       01-11    133<L>  |  103  |  64<H>  ----------------------------<  177<H>  4.1   |  25  |  3.00<H>    Ca    8.9      11 Jan 2024 07:55  Phos  4.5     01-09  Mg     2.5     01-10    TPro  7.1  /  Alb  3.1<L>  /  TBili  0.4  /  DBili  x   /  AST  15  /  ALT  13  /  AlkPhos  60  01-11      PT/INR - ( 10 Manuel 2024 08:37 )   PT: 13.0 sec;   INR: 1.11 ratio         PTT - ( 10 Manuel 2024 08:37 )  PTT:21.5 sec    MICROBIOLOGY:    Culture - Blood (collected 09 Jan 2024 01:23)  Source: .Blood Blood  Preliminary Report (11 Jan 2024 07:02):    No growth at 48 Hours    Culture - Blood (collected 09 Jan 2024 01:18)  Source: .Blood Blood  Preliminary Report (11 Jan 2024 07:02):    No growth at 48 Hours    Culture - Urine (collected 08 Jan 2024 04:09)  Source: Clean Catch Clean Catch (Midstream)  Final Report (10 Manuel 2024 13:54):    >100,000 CFU/ml Klebsiella pneumoniae  Organism: Klebsiella pneumoniae (10 Manuel 2024 13:54)  Organism: Klebsiella pneumoniae (10 Manuel 2024 13:54)      -  Levofloxacin: S <=0.5      -  Tobramycin: S <=2      -  Nitrofurantoin: S <=32 Should not be used to treat pyelonephritis      -  Aztreonam: S <=4      -  Gentamicin: S <=2      -  Cefazolin: S <=2 For uncomplicated UTI with K. pneumoniae, E. coli, or P. mirablis: CATRACHITA <=16 is sensitive and CATRACHITA >=32 is resistant. This also predicts results for oral agents cefaclor, cefdinir, cefpodoxime, cefprozil, cefuroxime axetil, cephalexin and locarbef for uncomplicated UTI. Note that some isolates may be susceptible to these agents while testing resistant to cefazolin.      -  Cefepime: S <=2      -  Piperacillin/Tazobactam: S <=8      -  Ciprofloxacin: S <=0.25      -  Imipenem: S <=1      -  Ceftriaxone: S <=1      -  Ampicillin: R <=8 These ampicillin results predict results for amoxicillin      Method Type: CATRACHITA      -  Meropenem: S <=1      -  Ampicillin/Sulbactam: S <=4/2      -  Cefoxitin: S <=8      -  Cefuroxime: S <=4      -  Amoxicillin/Clavulanic Acid: S <=8/4      -  Trimethoprim/Sulfamethoxazole: S <=0.5/9.5      -  Ertapenem: S <=0.5      RADIOLOGY & ADDITIONAL STUDIES:    ACC: [id number] EXAM:  CT ABDOMEN AND PELVIS   ORDERED BY:  TEMO CASTELAN     PROCEDURE DATE:  01/10/2024          INTERPRETATION:  CLINICAL INFORMATION: 73 years  Female with Nausea,   vomiting.    COMPARISON: Noncontrast CT abdomen and pelvis 8/11/2023    CONTRAST/COMPLICATIONS:  IV Contrast: NONE  Oral Contrast: NONE  Complications: None reported at time of study completion    PROCEDURE:  CT of the Abdomen and Pelvis was performed.  Sagittal and coronal reformats were performed.    FINDINGS:  LOWERCHEST: Small bilateral pleural effusions, larger on the right. Mild   cardiomegaly.    LIVER: Within normal limits.  BILE DUCTS: Normal caliber.  GALLBLADDER: Cholelithiasis.  SPLEEN: Within normal limits.  PANCREAS: Within normal limits.  ADRENALS: Within normal limits.  KIDNEYS/URETERS: Mild atrophy. No hydronephrosis. Bilateral nonspecific   perinephric fat stranding.    BLADDER: Within normal limits.  REPRODUCTIVE ORGANS: Unremarkable uterus.    BOWEL: No bowel obstruction. Unremarkable appendix. New right lower   quadrant metallic clip, possibly hemostatic clip in the cecum.  PERITONEUM: No ascites.  VESSELS: Atherosclerotic changes.  RETROPERITONEUM/LYMPH NODES: No lymphadenopathy. Periaortic lymph nodes   measuring up to 5 mm short axis.  ABDOMINAL WALL: Anasarca.  BONES: Bilateral L5 spondylolysis with grade 1 L5-S1 anterolisthesis.   Degenerative changes.    IMPRESSION:  Small bilateral pleural effusions, larger on the right. Mild cardiomegaly.    No acute intra-abdominal pathology.    New right lower quadrant metallic clip, possibly hemostatic clip in the   cecum. Correlate clinically.    Assessment :   74yo F with PMHx IDDM2, HFrEF, HTN, hypothyroidism, LBBB, chronic lymphedema hx of septic shock and Ecoli bacteremia sec Left pyelo with hydro 6/2023 admitted with nausea and vomiting poss sec UTI  ?Viral gastroenteritis  CT AP unremarkable  Has mild rash on neck and trunk - doubt drug rash   Clinically improving    Plan :   Ceftin x 4 days in am  Sp Rocephin  Trend temps and cbc  Anti emetic  Asp precautions  Pulm toileting  Stable from ID standpoint    Continue with present regiment.  Appropriate use of antibiotics and adverse effects reviewed.      > 35 minutes were spent in direct patient care reviewing notes, medications ,labs data/ imaging , discussion with multidisciplinary team.    Thank you for allowing me to participate in care of your patient .    Robby Clark MD  Infectious Disease  [phone]      EVELYN LOPEZ is a 73yFemale , patient examined and chart reviewed.     INTERVAL HPI/ OVERNIGHT EVENTS:   Feeling better. Afebrile.  Nausea improving.    PAST MEDICAL & SURGICAL HISTORY:  Hypertension  Diabetes  Lymphedema  H/O left bundle branch block  History of left bundle branch block (LBBB)  Systolic heart failure, chronic  H/O cataract  2020  History of surgical removal of meniscus of knee  left in 1971  Frozen shoulder  2000  H/O Achilles tendon repair  lengthened bilaterally, 2000  Fractured skull  1968      For details regarding the patient's social history, family history, and other miscellaneous elements, please refer the initial infectious diseases consultation and/or the admitting history and physical examination for this admission.      ROS:  CONSTITUTIONAL:  Negative fever or chills  EYES:  Negative  blurry vision or double vision  CARDIOVASCULAR:  Negative for chest pain or palpitations  RESPIRATORY:  Negative for cough, wheezing, or SOB   GASTROINTESTINAL:  Negative for nausea, vomiting, diarrhea, constipation, or abdominal pain  GENITOURINARY:  Negative frequency, urgency or dysuria  NEUROLOGIC:  No headache, confusion, dizziness, lightheadedness  All other systems were reviewed and are negative     penicillins (Hives)  Ativan (Rash; Urticaria)      Current inpatient medications :    ANTIBIOTICS/RELEVANT:      dextrose 5%. 1000 milliLiter(s) IV Continuous <Continuous>  dextrose 5%. 1000 milliLiter(s) IV Continuous <Continuous>  dextrose 50% Injectable 25 Gram(s) IV Push once  dextrose 50% Injectable 25 Gram(s) IV Push once  dextrose 50% Injectable 12.5 Gram(s) IV Push once  dextrose Oral Gel 15 Gram(s) Oral once PRN  famotidine  IVPB 20 milliGRAM(s) IV Intermittent daily  glucagon  Injectable 1 milliGRAM(s) IntraMuscular once  heparin   Injectable 5000 Unit(s) SubCutaneous every 8 hours  insulin glargine Injectable (LANTUS) 26 Unit(s) SubCutaneous at bedtime  insulin lispro (ADMELOG) corrective regimen sliding scale   SubCutaneous three times a day before meals  insulin lispro (ADMELOG) corrective regimen sliding scale   SubCutaneous at bedtime  levothyroxine Injectable 56 MICROGram(s) IV Push at bedtime  metoclopramide 10 milliGRAM(s) Oral every 8 hours PRN  nystatin Powder 1 Application(s) Topical three times a day  sodium chloride 0.9%. 1000 milliLiter(s) IV Continuous <Continuous>  triamcinolone 0.1% Cream 1 Application(s) Topical every 12 hours  trimethobenzamide Injectable 200 milliGRAM(s) IntraMuscular every 8 hours PRN      Objective:    01-11 @ 07:01  -  01-11 @ 15:38  --------------------------------------------------------  IN: 0 mL / OUT: 150 mL / NET: -150 mL      T(C): 36.7 (01-11-24 @ 12:28), Max: 36.9 (01-11-24 @ 05:35)  HR: 58 (01-11-24 @ 12:28) (58 - 64)  BP: 155/70 (01-11-24 @ 12:28) (118/70 - 155/70)  RR: 18 (01-11-24 @ 12:28) (18 - 18)  SpO2: 91% (01-11-24 @ 12:28) (91% - 93%)  Wt(kg): --      Physical Exam:  General:  no acute distress  Neck: supple, trachea midline  Lungs: clear, no wheeze/rhonchi  Cardiovascular: regular rate and rhythm, S1 S2  Abdomen: soft, nontender,  bowel sounds normal  Neurological: alert and oriented x3  Skin: no rash  Extremities: + edema      LABS:                        11.8   9.53  )-----------( 206      ( 11 Jan 2024 07:55 )             37.9       01-11    133<L>  |  103  |  64<H>  ----------------------------<  177<H>  4.1   |  25  |  3.00<H>    Ca    8.9      11 Jan 2024 07:55  Phos  4.5     01-09  Mg     2.5     01-10    TPro  7.1  /  Alb  3.1<L>  /  TBili  0.4  /  DBili  x   /  AST  15  /  ALT  13  /  AlkPhos  60  01-11      PT/INR - ( 10 Manuel 2024 08:37 )   PT: 13.0 sec;   INR: 1.11 ratio         PTT - ( 10 Manuel 2024 08:37 )  PTT:21.5 sec    MICROBIOLOGY:    Culture - Blood (collected 09 Jan 2024 01:23)  Source: .Blood Blood  Preliminary Report (11 Jan 2024 07:02):    No growth at 48 Hours    Culture - Blood (collected 09 Jan 2024 01:18)  Source: .Blood Blood  Preliminary Report (11 Jan 2024 07:02):    No growth at 48 Hours    Culture - Urine (collected 08 Jan 2024 04:09)  Source: Clean Catch Clean Catch (Midstream)  Final Report (10 Manuel 2024 13:54):    >100,000 CFU/ml Klebsiella pneumoniae  Organism: Klebsiella pneumoniae (10 Manuel 2024 13:54)  Organism: Klebsiella pneumoniae (10 Manuel 2024 13:54)      -  Levofloxacin: S <=0.5      -  Tobramycin: S <=2      -  Nitrofurantoin: S <=32 Should not be used to treat pyelonephritis      -  Aztreonam: S <=4      -  Gentamicin: S <=2      -  Cefazolin: S <=2 For uncomplicated UTI with K. pneumoniae, E. coli, or P. mirablis: CATRACHITA <=16 is sensitive and CATRACHITA >=32 is resistant. This also predicts results for oral agents cefaclor, cefdinir, cefpodoxime, cefprozil, cefuroxime axetil, cephalexin and locarbef for uncomplicated UTI. Note that some isolates may be susceptible to these agents while testing resistant to cefazolin.      -  Cefepime: S <=2      -  Piperacillin/Tazobactam: S <=8      -  Ciprofloxacin: S <=0.25      -  Imipenem: S <=1      -  Ceftriaxone: S <=1      -  Ampicillin: R <=8 These ampicillin results predict results for amoxicillin      Method Type: CATRACHITA      -  Meropenem: S <=1      -  Ampicillin/Sulbactam: S <=4/2      -  Cefoxitin: S <=8      -  Cefuroxime: S <=4      -  Amoxicillin/Clavulanic Acid: S <=8/4      -  Trimethoprim/Sulfamethoxazole: S <=0.5/9.5      -  Ertapenem: S <=0.5      RADIOLOGY & ADDITIONAL STUDIES:    ACC: [id number] EXAM:  CT ABDOMEN AND PELVIS   ORDERED BY:  TEMO CASTELAN     PROCEDURE DATE:  01/10/2024          INTERPRETATION:  CLINICAL INFORMATION: 73 years  Female with Nausea,   vomiting.    COMPARISON: Noncontrast CT abdomen and pelvis 8/11/2023    CONTRAST/COMPLICATIONS:  IV Contrast: NONE  Oral Contrast: NONE  Complications: None reported at time of study completion    PROCEDURE:  CT of the Abdomen and Pelvis was performed.  Sagittal and coronal reformats were performed.    FINDINGS:  LOWERCHEST: Small bilateral pleural effusions, larger on the right. Mild   cardiomegaly.    LIVER: Within normal limits.  BILE DUCTS: Normal caliber.  GALLBLADDER: Cholelithiasis.  SPLEEN: Within normal limits.  PANCREAS: Within normal limits.  ADRENALS: Within normal limits.  KIDNEYS/URETERS: Mild atrophy. No hydronephrosis. Bilateral nonspecific   perinephric fat stranding.    BLADDER: Within normal limits.  REPRODUCTIVE ORGANS: Unremarkable uterus.    BOWEL: No bowel obstruction. Unremarkable appendix. New right lower   quadrant metallic clip, possibly hemostatic clip in the cecum.  PERITONEUM: No ascites.  VESSELS: Atherosclerotic changes.  RETROPERITONEUM/LYMPH NODES: No lymphadenopathy. Periaortic lymph nodes   measuring up to 5 mm short axis.  ABDOMINAL WALL: Anasarca.  BONES: Bilateral L5 spondylolysis with grade 1 L5-S1 anterolisthesis.   Degenerative changes.    IMPRESSION:  Small bilateral pleural effusions, larger on the right. Mild cardiomegaly.    No acute intra-abdominal pathology.    New right lower quadrant metallic clip, possibly hemostatic clip in the   cecum. Correlate clinically.    Assessment :   74yo F with PMHx IDDM2, HFrEF, HTN, hypothyroidism, LBBB, chronic lymphedema hx of septic shock and Ecoli bacteremia sec Left pyelo with hydro 6/2023 admitted with nausea and vomiting poss sec UTI  ?Viral gastroenteritis  CT AP unremarkable  Has mild rash on neck and trunk - doubt drug rash   Clinically improving    Plan :   Ceftin x 4 days in am  Sp Rocephin  Trend temps and cbc  Anti emetic  Asp precautions  Pulm toileting  Stable from ID standpoint    Continue with present regiment.  Appropriate use of antibiotics and adverse effects reviewed.      > 35 minutes were spent in direct patient care reviewing notes, medications ,labs data/ imaging , discussion with multidisciplinary team.    Thank you for allowing me to participate in care of your patient .    Robby Clark MD  Infectious Disease  [phone]

## 2024-01-11 NOTE — PROGRESS NOTE ADULT - SUBJECTIVE AND OBJECTIVE BOX
Patient is a 73y old  Female who presents with a chief complaint of UTI (09 Jan 2024 16:52)       HPI:  72yo F with PMHx T2DM, HFrEF, HTN, hypothyroidism, LBBB, chronic lymphedema, CKD presents to the ED for nausea and vomiting. Pt came to the ED yesterday for nausea and non-bloody emesis, was found to have a UTI, and sent home on Cipro. Pt unable to tolerate PO at home so she came back to the ED. Pt states that partner has been sick at home. No recent travel.  Pt recently admitted to [facility] fo e.coli bacteremia from Aug 11-15.   Of note, Pt additionally admitted in June with septic shock and E. coli bacteremia 2/2 left pyelo with hydro- no obtructive uropathy on CT. She was on pressors and given 10 days of iv rocephin.   Sees Dr. Wilde for outpatient nephrologist.  Was previously on dialysis but was able to come off. Has had decreased PO intake and N/V. + sick contacts.       Still with nausea and vomitting. NO SOB.      PAST MEDICAL & SURGICAL HISTORY:  Hypertension      Diabetes      Lymphedema      H/O left bundle branch block      History of left bundle branch block (LBBB)      Systolic heart failure, chronic      H/O cataract  2020      History of surgical removal of meniscus of knee  left in 1971      Frozen shoulder  2000      H/O Achilles tendon repair  lengthened bilaterally, 2000      Fractured skull  1968           FAMILY HISTORY:  Family history of CVA  mom, age 57    [place]    Social History:Non smoker    MEDICATIONS  (STANDING):  cefTRIAXone   IVPB 1000 milliGRAM(s) IV Intermittent every 24 hours  dextrose 5%. 1000 milliLiter(s) (50 mL/Hr) IV Continuous <Continuous>  dextrose 5%. 1000 milliLiter(s) (100 mL/Hr) IV Continuous <Continuous>  dextrose 50% Injectable 25 Gram(s) IV Push once  dextrose 50% Injectable 25 Gram(s) IV Push once  dextrose 50% Injectable 12.5 Gram(s) IV Push once  famotidine  IVPB 20 milliGRAM(s) IV Intermittent daily  furosemide   Injectable 20 milliGRAM(s) IV Push two times a day  glucagon  Injectable 1 milliGRAM(s) IntraMuscular once  heparin   Injectable 5000 Unit(s) SubCutaneous every 8 hours  insulin glargine Injectable (LANTUS) 26 Unit(s) SubCutaneous at bedtime  insulin lispro (ADMELOG) corrective regimen sliding scale   SubCutaneous three times a day before meals  insulin lispro (ADMELOG) corrective regimen sliding scale   SubCutaneous at bedtime  lactated ringers. 1000 milliLiter(s) (70 mL/Hr) IV Continuous <Continuous>  levothyroxine Injectable 56 MICROGram(s) IV Push at bedtime    MEDICATIONS  (PRN):  dextrose Oral Gel 15 Gram(s) Oral once PRN Blood Glucose LESS THAN 70 milliGRAM(s)/deciliter  trimethobenzamide Injectable 200 milliGRAM(s) IntraMuscular every 8 hours PRN Nausea and/or Vomiting   Meds reviewed    Allergies    penicillins (Hives)  Ativan (Rash; Urticaria)    Intolerances         REVIEW OF SYSTEMS:  as above      ICU Vital Signs Last 24 Hrs  T(C): 36.7 (11 Jan 2024 12:28), Max: 36.9 (11 Jan 2024 05:35)  T(F): 98 (11 Jan 2024 12:28), Max: 98.4 (11 Jan 2024 05:35)  HR: 58 (11 Jan 2024 12:28) (58 - 64)  BP: 155/70 (11 Jan 2024 12:28) (118/70 - 155/70)  BP(mean): --  ABP: --  ABP(mean): --  RR: 18 (11 Jan 2024 12:28) (18 - 18)  SpO2: 91% (11 Jan 2024 12:28) (91% - 93%)    O2 Parameters below as of 11 Jan 2024 12:28  Patient On (Oxygen Delivery Method): room air          PHYSICAL EXAM:    GENERAL: ill appearing  HEAD:  Atraumatic, Normocephalic  EYES: EOMI, conjunctiva and sclera clear  ENMT: No Drainage from nares, No drainage from ears  NERVOUS SYSTEM:  Awake and Alert  CHEST/LUNG: Clear to percussion bilaterally  EXTREMITIES:  ++Edema  SKIN: No rashes No obvious ecchymosis      LABS:                                   12.0   14.39 )-----------( 185      ( 10 Manuel 2024 05:55 )             37.5     01-10    134<L>  |  105  |  59<H>  ----------------------------<  201<H>  4.4   |  22  |  2.70<H>    Ca    8.6      10 Manuel 2024 05:55  Phos  4.5     01-09  Mg     2.5     01-10    TPro  7.4  /  Alb  3.2<L>  /  TBili  0.5  /  DBili  x   /  AST  19  /  ALT  14  /  AlkPhos  69  01-10    PT/INR - ( 10 Manuel 2024 08:37 )   PT: 13.0 sec;   INR: 1.11 ratio         PTT - ( 10 Manuel 2024 08:37 )  PTT:21.5 sec  Urinalysis Basic - ( 10 Manuel 2024 05:55 )    Color: x / Appearance: x / SG: x / pH: x  Gluc: 201 mg/dL / Ketone: x  / Bili: x / Urobili: x   Blood: x / Protein: x / Nitrite: x   Leuk Esterase: x / RBC: x / WBC x   Sq Epi: x / Non Sq Epi: x / Bacteria: x         Patient is a 73y old  Female who presents with a chief complaint of UTI (09 Jan 2024 16:52)       HPI:  72yo F with PMHx T2DM, HFrEF, HTN, hypothyroidism, LBBB, chronic lymphedema, CKD presents to the ED for nausea and vomiting. Pt came to the ED yesterday for nausea and non-bloody emesis, was found to have a UTI, and sent home on Cipro. Pt unable to tolerate PO at home so she came back to the ED. Pt states that partner has been sick at home. No recent travel.  Pt recently admitted to [facility] fo e.coli bacteremia from Aug 11-15.   Of note, Pt additionally admitted in June with septic shock and E. coli bacteremia 2/2 left pyelo with hydro- no obtructive uropathy on CT. She was on pressors and given 10 days of iv rocephin.   Sees Dr. Wilde for outpatient nephrologist.  Was previously on dialysis but was able to come off. Has had decreased PO intake and N/V. + sick contacts.       Still with nausea and vomitting. NO SOB.      PAST MEDICAL & SURGICAL HISTORY:  Hypertension      Diabetes      Lymphedema      H/O left bundle branch block      History of left bundle branch block (LBBB)      Systolic heart failure, chronic      H/O cataract  2020      History of surgical removal of meniscus of knee  left in 1971      Frozen shoulder  2000      H/O Achilles tendon repair  lengthened bilaterally, 2000      Fractured skull  1968           FAMILY HISTORY:  Family history of CVA  mom, age 57    [place]    Social History:Non smoker    MEDICATIONS  (STANDING):  cefTRIAXone   IVPB 1000 milliGRAM(s) IV Intermittent every 24 hours  dextrose 5%. 1000 milliLiter(s) (50 mL/Hr) IV Continuous <Continuous>  dextrose 5%. 1000 milliLiter(s) (100 mL/Hr) IV Continuous <Continuous>  dextrose 50% Injectable 25 Gram(s) IV Push once  dextrose 50% Injectable 25 Gram(s) IV Push once  dextrose 50% Injectable 12.5 Gram(s) IV Push once  famotidine  IVPB 20 milliGRAM(s) IV Intermittent daily  furosemide   Injectable 20 milliGRAM(s) IV Push two times a day  glucagon  Injectable 1 milliGRAM(s) IntraMuscular once  heparin   Injectable 5000 Unit(s) SubCutaneous every 8 hours  insulin glargine Injectable (LANTUS) 26 Unit(s) SubCutaneous at bedtime  insulin lispro (ADMELOG) corrective regimen sliding scale   SubCutaneous three times a day before meals  insulin lispro (ADMELOG) corrective regimen sliding scale   SubCutaneous at bedtime  lactated ringers. 1000 milliLiter(s) (70 mL/Hr) IV Continuous <Continuous>  levothyroxine Injectable 56 MICROGram(s) IV Push at bedtime    MEDICATIONS  (PRN):  dextrose Oral Gel 15 Gram(s) Oral once PRN Blood Glucose LESS THAN 70 milliGRAM(s)/deciliter  trimethobenzamide Injectable 200 milliGRAM(s) IntraMuscular every 8 hours PRN Nausea and/or Vomiting   Meds reviewed    Allergies    penicillins (Hives)  Ativan (Rash; Urticaria)    Intolerances         REVIEW OF SYSTEMS:  as above      ICU Vital Signs Last 24 Hrs  T(C): 36.7 (11 Jan 2024 12:28), Max: 36.9 (11 Jan 2024 05:35)  T(F): 98 (11 Jan 2024 12:28), Max: 98.4 (11 Jan 2024 05:35)  HR: 58 (11 Jan 2024 12:28) (58 - 64)  BP: 155/70 (11 Jan 2024 12:28) (118/70 - 155/70)  BP(mean): --  ABP: --  ABP(mean): --  RR: 18 (11 Jan 2024 12:28) (18 - 18)  SpO2: 91% (11 Jan 2024 12:28) (91% - 93%)    O2 Parameters below as of 11 Jan 2024 12:28  Patient On (Oxygen Delivery Method): room air          PHYSICAL EXAM:    GENERAL: ill appearing  HEAD:  Atraumatic, Normocephalic  EYES: EOMI, conjunctiva and sclera clear  ENMT: No Drainage from nares, No drainage from ears  NERVOUS SYSTEM:  Awake and Alert  CHEST/LUNG: Clear to percussion bilaterally  EXTREMITIES:  ++Edema  SKIN: No rashes No obvious ecchymosis      LABS:                                   12.0   14.39 )-----------( 185      ( 10 Manuel 2024 05:55 )             37.5     01-10    134<L>  |  105  |  59<H>  ----------------------------<  201<H>  4.4   |  22  |  2.70<H>    Ca    8.6      10 Manuel 2024 05:55  Phos  4.5     01-09  Mg     2.5     01-10    TPro  7.4  /  Alb  3.2<L>  /  TBili  0.5  /  DBili  x   /  AST  19  /  ALT  14  /  AlkPhos  69  01-10    PT/INR - ( 10 Manuel 2024 08:37 )   PT: 13.0 sec;   INR: 1.11 ratio         PTT - ( 10 Manuel 2024 08:37 )  PTT:21.5 sec  Urinalysis Basic - ( 10 Manuel 2024 05:55 )    Color: x / Appearance: x / SG: x / pH: x  Gluc: 201 mg/dL / Ketone: x  / Bili: x / Urobili: x   Blood: x / Protein: x / Nitrite: x   Leuk Esterase: x / RBC: x / WBC x   Sq Epi: x / Non Sq Epi: x / Bacteria: x

## 2024-01-12 NOTE — DIETITIAN INITIAL EVALUATION ADULT - PROBLEM SELECTOR PLAN 3
Chronic   - continue home metoprolol with hold parameters; transitioned to Lopressor 5mg q6hrs for IV dosing as pt cannot tolerate PO   - can resume Metop succinate 100mg qd when able   - HOLD home ASA as pt unable to tolerate PO, continue when able   - Monitor hemodynamics

## 2024-01-12 NOTE — DIETITIAN INITIAL EVALUATION ADULT - PROBLEM SELECTOR PLAN 5
Chronic   - will hold home eplerenone in setting of pt unable to tolerate PO   - continue Lasix 20mg IVP BID with hold parameters in place of torsemide 20mg PO BID   - DASH diet

## 2024-01-12 NOTE — PROGRESS NOTE ADULT - PROBLEM SELECTOR PLAN 3
Chronic   - continue home metoprolol with hold parameters  - continue home ASA   - Monitor hemodynamics

## 2024-01-12 NOTE — CONSULT NOTE ADULT - SUBJECTIVE AND OBJECTIVE BOX
[facility]  Franklin Smith PA-C  [address] [place] [id number]  [phone]      Chief Complaint:  Patient is a 73y old  Female who presents with a chief complaint of UTI (12 Jan 2024 11:53)      HPI:  74yo F with PMHx T2DM, HFrEF, HTN, hypothyroidism, LBBB, chronic lymphedema, CKD presents to the ED for nausea and vomiting. Pt came to the ED yesterday for nausea and non-bloody emesis, was found to have a UTI, and sent home on Cipro. Pt unable to tolerate PO at home so she came back to the ED. Pt states that partner has been sick at home. No recent travel.  Pt recently admitted to [facility] fo e.coli bacteremia from Aug 11-15.   Of note, Pt additionally admitted in June with septic shock and E. coli bacteremia 2/2 left pyelo with hydro- no obtructive uropathy on CT. She was on pressors and given 10 days of iv rocephin.     INTERVAL HPI:  Pt s/e  Discussed same hx as above  Nausea/vomiting now resolving  Tolerating clear liquid diet    Allergies:  penicillins (Hives)  Ativan (Rash; Urticaria)      Medications:  cefuroxime   Tablet 250 milliGRAM(s) Oral every 12 hours  dextrose 5%. 1000 milliLiter(s) IV Continuous <Continuous>  dextrose 5%. 1000 milliLiter(s) IV Continuous <Continuous>  dextrose 50% Injectable 25 Gram(s) IV Push once  dextrose 50% Injectable 12.5 Gram(s) IV Push once  dextrose 50% Injectable 25 Gram(s) IV Push once  dextrose Oral Gel 15 Gram(s) Oral once PRN  famotidine  IVPB 20 milliGRAM(s) IV Intermittent daily  glucagon  Injectable 1 milliGRAM(s) IntraMuscular once  heparin   Injectable 5000 Unit(s) SubCutaneous every 8 hours  insulin glargine Injectable (LANTUS) 26 Unit(s) SubCutaneous at bedtime  insulin lispro (ADMELOG) corrective regimen sliding scale   SubCutaneous three times a day before meals  insulin lispro (ADMELOG) corrective regimen sliding scale   SubCutaneous at bedtime  levothyroxine Injectable 56 MICROGram(s) IV Push at bedtime  metoclopramide 10 milliGRAM(s) Oral every 8 hours PRN  nystatin Powder 1 Application(s) Topical three times a day  sodium chloride 0.9%. 1000 milliLiter(s) IV Continuous <Continuous>  triamcinolone 0.1% Cream 1 Application(s) Topical every 12 hours  trimethobenzamide Injectable 200 milliGRAM(s) IntraMuscular every 8 hours PRN      PMHX/PSHX:  Hypertension    Diabetes    Lymphedema    H/O left bundle branch block    History of left bundle branch block (LBBB)    Systolic heart failure, chronic    H/O cataract    History of surgical removal of meniscus of knee    Frozen shoulder    H/O Achilles tendon repair    Fractured skull        Family history:  No pertinent family history in first degree relatives    Family history of CVA        ROS:   General:  No fevers, chills, night sweats, fatigue,   Eyes:  Good vision, no reported pain  ENT:  No sore throat, pain, runny nose, dysphagia  CV:  No pain, palpitations, hypo/hypertension  Resp:  No dyspnea, cough, tachypnea, wheezing  GI:  No pain, +nausea, +vomiting, No diarrhea, No constipation, No weight loss, No fever, No pruritis, No rectal bleeding, No tarry stools, No dysphagia,  :  No pain, bleeding, incontinence, nocturia  Muscle:  No pain, weakness  Neuro:  No weakness, tingling, memory problems  Psych:  No fatigue, insomnia, mood problems, depression  Endocrine:  No polyuria, polydipsia, cold/heat intolerance  Heme:  No petechiae, ecchymosis, easy bruisability  Skin:  No rash, tattoos, scars, edema      PHYSICAL EXAM:   Vital Signs:  Vital Signs Last 24 Hrs  T(C): 36.8 (12 Jan 2024 05:31), Max: 36.8 (12 Jan 2024 05:31)  T(F): 98.2 (12 Jan 2024 05:31), Max: 98.2 (12 Jan 2024 05:31)  HR: 62 (12 Jan 2024 05:31) (58 - 64)  BP: 119/76 (12 Jan 2024 05:31) (119/76 - 155/70)  BP(mean): --  RR: 18 (12 Jan 2024 05:31) (18 - 18)  SpO2: 92% (12 Jan 2024 05:31) (91% - 92%)    Parameters below as of 12 Jan 2024 05:31  Patient On (Oxygen Delivery Method): room air      GENERAL:  Appears stated age  HEENT:  NC/AT  CHEST:  Full & symmetric excursion  HEART:  Regular rhythm  ABDOMEN:  Soft, non-tender, non-distended  EXTEREMITIES:  no cyanosis, clubbing or edema  SKIN:  No rash  NEURO:  Alert    LABS:                        11.9   8.09  )-----------( 228      ( 12 Jan 2024 06:41 )             38.2     01-12    134<L>  |  103  |  63<H>  ----------------------------<  144<H>  3.9   |  28  |  2.70<H>    Ca    8.5      12 Jan 2024 06:41  Phos  4.0     01-12  Mg     2.3     01-12    TPro  7.1  /  Alb  3.1<L>  /  TBili  0.4  /  DBili  x   /  AST  15  /  ALT  13  /  AlkPhos  60  01-11    LIVER FUNCTIONS - ( 11 Jan 2024 07:55 )  Alb: 3.1 g/dL / Pro: 7.1 g/dL / ALK PHOS: 60 U/L / ALT: 13 U/L / AST: 15 U/L / GGT: x             Urinalysis Basic - ( 12 Jan 2024 06:41 )    Color: x / Appearance: x / SG: x / pH: x  Gluc: 144 mg/dL / Ketone: x  / Bili: x / Urobili: x   Blood: x / Protein: x / Nitrite: x   Leuk Esterase: x / RBC: x / WBC x   Sq Epi: x / Non Sq Epi: x / Bacteria: x          Imaging:  < from: CT Abdomen and Pelvis No Cont (01.10.24 @ 08:12) >    ACC: [id number] EXAM:  CT ABDOMEN AND PELVIS   ORDERED BY:  TEMO SMITH     PROCEDURE DATE:  01/10/2024          INTERPRETATION:  CLINICAL INFORMATION: 73 years  Female with Nausea,   vomiting.    COMPARISON: Noncontrast CT abdomen and pelvis 8/11/2023    CONTRAST/COMPLICATIONS:  IV Contrast: NONE  Oral Contrast: NONE  Complications: None reported at time of study completion    PROCEDURE:  CT of the Abdomen and Pelvis was performed.  Sagittal and coronal reformats were performed.    FINDINGS:  LOWERCHEST: Small bilateral pleural effusions, larger on the right. Mild   cardiomegaly.    LIVER: Within normal limits.  BILE DUCTS: Normal caliber.  GALLBLADDER: Cholelithiasis.  SPLEEN: Within normal limits.  PANCREAS: Within normal limits.  ADRENALS: Within normal limits.  KIDNEYS/URETERS: Mild atrophy. No hydronephrosis. Bilateral nonspecific   perinephric fat stranding.    BLADDER: Within normal limits.  REPRODUCTIVE ORGANS: Unremarkable uterus.    BOWEL: No bowel obstruction. Unremarkable appendix. New right lower   quadrant metallic clip, possibly hemostatic clip in the cecum.  PERITONEUM: No ascites.  VESSELS: Atherosclerotic changes.  RETROPERITONEUM/LYMPH NODES: No lymphadenopathy. Periaortic lymph nodes   measuring up to 5 mm short axis.  ABDOMINAL WALL: Anasarca.  BONES: Bilateral L5 spondylolysis with grade 1 L5-S1 anterolisthesis.   Degenerative changes.    IMPRESSION:  Small bilateral pleural effusions, larger on the right. Mild cardiomegaly.    No acute intra-abdominal pathology.    New right lower quadrant metallic clip, possibly hemostatic clip in the   cecum. Correlate clinically.    --- End of Report ---      JALIL RODRIGUEZ MD; Attending Radiologist  This document has been electronically signed. Manuel 10 2024 10:32AM    < end of copied text >           [facility]  Franklin Smith PA-C  [address] [place] [id number]  [phone]      Chief Complaint:  Patient is a 73y old  Female who presents with a chief complaint of UTI (12 Jan 2024 11:53)      HPI:  74yo F with PMHx T2DM, HFrEF, HTN, hypothyroidism, LBBB, chronic lymphedema, CKD presents to the ED for nausea and vomiting. Pt came to the ED yesterday for nausea and non-bloody emesis, was found to have a UTI, and sent home on Cipro. Pt unable to tolerate PO at home so she came back to the ED. Pt states that partner has been sick at home. No recent travel.  Pt recently admitted to [facility] fo e.coli bacteremia from Aug 11-15.   Of note, Pt additionally admitted in June with septic shock and E. coli bacteremia 2/2 left pyelo with hydro- no obtructive uropathy on CT. She was on pressors and given 10 days of iv rocephin.     INTERVAL HPI:  Pt s/e  Discussed same hx as above  Nausea/vomiting now resolving  Tolerating clear liquid diet    Allergies:  penicillins (Hives)  Ativan (Rash; Urticaria)      Medications:  cefuroxime   Tablet 250 milliGRAM(s) Oral every 12 hours  dextrose 5%. 1000 milliLiter(s) IV Continuous <Continuous>  dextrose 5%. 1000 milliLiter(s) IV Continuous <Continuous>  dextrose 50% Injectable 25 Gram(s) IV Push once  dextrose 50% Injectable 12.5 Gram(s) IV Push once  dextrose 50% Injectable 25 Gram(s) IV Push once  dextrose Oral Gel 15 Gram(s) Oral once PRN  famotidine  IVPB 20 milliGRAM(s) IV Intermittent daily  glucagon  Injectable 1 milliGRAM(s) IntraMuscular once  heparin   Injectable 5000 Unit(s) SubCutaneous every 8 hours  insulin glargine Injectable (LANTUS) 26 Unit(s) SubCutaneous at bedtime  insulin lispro (ADMELOG) corrective regimen sliding scale   SubCutaneous three times a day before meals  insulin lispro (ADMELOG) corrective regimen sliding scale   SubCutaneous at bedtime  levothyroxine Injectable 56 MICROGram(s) IV Push at bedtime  metoclopramide 10 milliGRAM(s) Oral every 8 hours PRN  nystatin Powder 1 Application(s) Topical three times a day  sodium chloride 0.9%. 1000 milliLiter(s) IV Continuous <Continuous>  triamcinolone 0.1% Cream 1 Application(s) Topical every 12 hours  trimethobenzamide Injectable 200 milliGRAM(s) IntraMuscular every 8 hours PRN      PMHX/PSHX:  Hypertension    Diabetes    Lymphedema    H/O left bundle branch block    History of left bundle branch block (LBBB)    Systolic heart failure, chronic    H/O cataract    History of surgical removal of meniscus of knee    Frozen shoulder    H/O Achilles tendon repair    Fractured skull        Family history:  No pertinent family history in first degree relatives    Family history of CVA        ROS:   General:  No fevers, chills, night sweats, fatigue,   Eyes:  Good vision, no reported pain  ENT:  No sore throat, pain, runny nose, dysphagia  CV:  No pain, palpitations, hypo/hypertension  Resp:  No dyspnea, cough, tachypnea, wheezing  GI:  No pain, +nausea, +vomiting, No diarrhea, No constipation, No weight loss, No fever, No pruritis, No rectal bleeding, No tarry stools, No dysphagia,  :  No pain, bleeding, incontinence, nocturia  Muscle:  No pain, weakness  Neuro:  No weakness, tingling, memory problems  Psych:  No fatigue, insomnia, mood problems, depression  Endocrine:  No polyuria, polydipsia, cold/heat intolerance  Heme:  No petechiae, ecchymosis, easy bruisability  Skin:  No rash, tattoos, scars, edema      PHYSICAL EXAM:   Vital Signs:  Vital Signs Last 24 Hrs  T(C): 36.8 (12 Jan 2024 05:31), Max: 36.8 (12 Jan 2024 05:31)  T(F): 98.2 (12 Jan 2024 05:31), Max: 98.2 (12 Jan 2024 05:31)  HR: 62 (12 Jan 2024 05:31) (58 - 64)  BP: 119/76 (12 Jan 2024 05:31) (119/76 - 155/70)  BP(mean): --  RR: 18 (12 Jan 2024 05:31) (18 - 18)  SpO2: 92% (12 Jan 2024 05:31) (91% - 92%)    Parameters below as of 12 Jan 2024 05:31  Patient On (Oxygen Delivery Method): room air      GENERAL:  Appears stated age  HEENT:  NC/AT  CHEST:  Full & symmetric excursion  HEART:  Regular rhythm  ABDOMEN:  Soft, non-tender, non-distended  EXTEREMITIES:  no cyanosis, clubbing or edema  SKIN:  No rash  NEURO:  Alert    LABS:                        11.9   8.09  )-----------( 228      ( 12 Jan 2024 06:41 )             38.2     01-12    134<L>  |  103  |  63<H>  ----------------------------<  144<H>  3.9   |  28  |  2.70<H>    Ca    8.5      12 Jan 2024 06:41  Phos  4.0     01-12  Mg     2.3     01-12    TPro  7.1  /  Alb  3.1<L>  /  TBili  0.4  /  DBili  x   /  AST  15  /  ALT  13  /  AlkPhos  60  01-11    LIVER FUNCTIONS - ( 11 Jan 2024 07:55 )  Alb: 3.1 g/dL / Pro: 7.1 g/dL / ALK PHOS: 60 U/L / ALT: 13 U/L / AST: 15 U/L / GGT: x             Urinalysis Basic - ( 12 Jan 2024 06:41 )    Color: x / Appearance: x / SG: x / pH: x  Gluc: 144 mg/dL / Ketone: x  / Bili: x / Urobili: x   Blood: x / Protein: x / Nitrite: x   Leuk Esterase: x / RBC: x / WBC x   Sq Epi: x / Non Sq Epi: x / Bacteria: x          Imaging:  < from: CT Abdomen and Pelvis No Cont (01.10.24 @ 08:12) >    ACC: [id number] EXAM:  CT ABDOMEN AND PELVIS   ORDERED BY:  TEMO SMITH     PROCEDURE DATE:  01/10/2024          INTERPRETATION:  CLINICAL INFORMATION: 73 years  Female with Nausea,   vomiting.    COMPARISON: Noncontrast CT abdomen and pelvis 8/11/2023    CONTRAST/COMPLICATIONS:  IV Contrast: NONE  Oral Contrast: NONE  Complications: None reported at time of study completion    PROCEDURE:  CT of the Abdomen and Pelvis was performed.  Sagittal and coronal reformats were performed.    FINDINGS:  LOWERCHEST: Small bilateral pleural effusions, larger on the right. Mild   cardiomegaly.    LIVER: Within normal limits.  BILE DUCTS: Normal caliber.  GALLBLADDER: Cholelithiasis.  SPLEEN: Within normal limits.  PANCREAS: Within normal limits.  ADRENALS: Within normal limits.  KIDNEYS/URETERS: Mild atrophy. No hydronephrosis. Bilateral nonspecific   perinephric fat stranding.    BLADDER: Within normal limits.  REPRODUCTIVE ORGANS: Unremarkable uterus.    BOWEL: No bowel obstruction. Unremarkable appendix. New right lower   quadrant metallic clip, possibly hemostatic clip in the cecum.  PERITONEUM: No ascites.  VESSELS: Atherosclerotic changes.  RETROPERITONEUM/LYMPH NODES: No lymphadenopathy. Periaortic lymph nodes   measuring up to 5 mm short axis.  ABDOMINAL WALL: Anasarca.  BONES: Bilateral L5 spondylolysis with grade 1 L5-S1 anterolisthesis.   Degenerative changes.    IMPRESSION:  Small bilateral pleural effusions, larger on the right. Mild cardiomegaly.    No acute intra-abdominal pathology.    New right lower quadrant metallic clip, possibly hemostatic clip in the   cecum. Correlate clinically.    --- End of Report ---      JALIL RODRIGUEZ MD; Attending Radiologist  This document has been electronically signed. Manuel 10 2024 10:32AM    < end of copied text >

## 2024-01-12 NOTE — DIETITIAN INITIAL EVALUATION ADULT - ORAL INTAKE PTA/DIET HISTORY
patient states with some N/V continuing. reports with stable weight PTA. follows Low Na and consistent cho diet . refusing education at this time states familiar with diet. await diet advancement for instruction prior to discharge.  on Novolog and lantus A1c 6.8%   no food allergies

## 2024-01-12 NOTE — PROGRESS NOTE ADULT - PROBLEM SELECTOR PLAN 9
Continue heparin 5000 q8hrs
,
Continue heparin 5000 q8hrs

## 2024-01-12 NOTE — PROGRESS NOTE ADULT - PROBLEM SELECTOR PLAN 7
Chronic   - Home synthroid converted to IV
Chronic   - Continue home synthroid

## 2024-01-12 NOTE — CONSULT NOTE ADULT - ASSESSMENT
Nausea/vomiting    CT noted  Clinically improving  Cont anti-emetics  Advance diet as tolerated  Will follow  D/w patient    I reviewed the overnight course of events on the unit, re-confirming the patient history. I discussed the care with the patient  Differential diagnosis and plan of care discussed with patient after the evaluation  35 minutes spent on total encounter of which more than fifty percent of the encounter was spent counseling and/or coordinating care by the attending physician.

## 2024-01-12 NOTE — PROGRESS NOTE ADULT - PROBLEM SELECTOR PLAN 5
Chronic   - will hold home eplerenone in setting of DUNCAN  - continue Lasix 20mg IVP BID with hold parameters in place of torsemide 20mg PO BID   - DASH diet
Chronic   - will hold home eplerenone in setting of pt unable to tolerate PO   - continue Lasix 20mg IVP BID with hold parameters in place of torsemide 20mg PO BID   - DASH diet

## 2024-01-12 NOTE — PROGRESS NOTE ADULT - SUBJECTIVE AND OBJECTIVE BOX
Patient is a 73y old  Female who presents with a chief complaint of UTI (12 Jan 2024 13:24)       INTERVAL HPI/OVERNIGHT EVENTS: Patient seen and examined at bedside. No overnight events. Tolerating diet. Denies fever, chills, chest pain, shortness of breath, abdominal pain, nausea/vomiting, headache.    MEDICATIONS  (STANDING):  cefuroxime   Tablet 250 milliGRAM(s) Oral every 12 hours  dextrose 5%. 1000 milliLiter(s) (50 mL/Hr) IV Continuous <Continuous>  dextrose 5%. 1000 milliLiter(s) (100 mL/Hr) IV Continuous <Continuous>  dextrose 50% Injectable 25 Gram(s) IV Push once  dextrose 50% Injectable 25 Gram(s) IV Push once  dextrose 50% Injectable 12.5 Gram(s) IV Push once  famotidine  IVPB 20 milliGRAM(s) IV Intermittent daily  glucagon  Injectable 1 milliGRAM(s) IntraMuscular once  heparin   Injectable 5000 Unit(s) SubCutaneous every 8 hours  insulin glargine Injectable (LANTUS) 26 Unit(s) SubCutaneous at bedtime  insulin lispro (ADMELOG) corrective regimen sliding scale   SubCutaneous three times a day before meals  insulin lispro (ADMELOG) corrective regimen sliding scale   SubCutaneous at bedtime  levothyroxine Injectable 56 MICROGram(s) IV Push at bedtime  nystatin Powder 1 Application(s) Topical three times a day  sodium chloride 0.9%. 1000 milliLiter(s) (84 mL/Hr) IV Continuous <Continuous>  triamcinolone 0.1% Cream 1 Application(s) Topical every 12 hours    MEDICATIONS  (PRN):  dextrose Oral Gel 15 Gram(s) Oral once PRN Blood Glucose LESS THAN 70 milliGRAM(s)/deciliter  metoclopramide 10 milliGRAM(s) Oral every 8 hours PRN N/V  trimethobenzamide Injectable 200 milliGRAM(s) IntraMuscular every 8 hours PRN Nausea and/or Vomiting      Allergies    penicillins (Hives)  Ativan (Rash; Urticaria)    Intolerances        REVIEW OF SYSTEMS:  CONSTITUTIONAL: No fever or chills  HEENT:  No headache, no sore throat  RESPIRATORY: No cough, wheezing, or shortness of breath  CARDIOVASCULAR: No chest pain, palpitations  GASTROINTESTINAL: No abd pain, nausea, vomiting, or diarrhea  GENITOURINARY: No dysuria, frequency, or hematuria  NEUROLOGICAL: no focal weakness or dizziness  MUSCULOSKELETAL: no myalgias     Vital Signs Last 24 Hrs  T(C): 36.7 (12 Jan 2024 12:30), Max: 36.8 (12 Jan 2024 05:31)  T(F): 98 (12 Jan 2024 12:30), Max: 98.2 (12 Jan 2024 05:31)  HR: 61 (12 Jan 2024 12:30) (61 - 64)  BP: 147/76 (12 Jan 2024 12:30) (119/76 - 147/76)  BP(mean): --  RR: 18 (12 Jan 2024 12:30) (18 - 18)  SpO2: 92% (12 Jan 2024 12:30) (91% - 92%)    Parameters below as of 12 Jan 2024 05:31  Patient On (Oxygen Delivery Method): room air        PHYSICAL EXAM:  GENERAL: NAD  HEENT:  anicteric, moist mucous membranes  CHEST/LUNG:  CTA b/l, no rales, wheezes, or rhonchi  HEART:  RRR, S1, S2  ABDOMEN:  BS+, soft, nontender, nondistended  EXTREMITIES: no edema, cyanosis, or calf tenderness  NERVOUS SYSTEM: answers questions and follows commands appropriately    LABS:                        11.9   8.09  )-----------( 228      ( 12 Jan 2024 06:41 )             38.2     CBC Full  -  ( 12 Jan 2024 06:41 )  WBC Count : 8.09 K/uL  Hemoglobin : 11.9 g/dL  Hematocrit : 38.2 %  Platelet Count - Automated : 228 K/uL  Mean Cell Volume : 92.0 fl  Mean Cell Hemoglobin : 28.7 pg  Mean Cell Hemoglobin Concentration : 31.2 gm/dL  Auto Neutrophil # : x  Auto Lymphocyte # : x  Auto Monocyte # : x  Auto Eosinophil # : x  Auto Basophil # : x  Auto Neutrophil % : x  Auto Lymphocyte % : x  Auto Monocyte % : x  Auto Eosinophil % : x  Auto Basophil % : x    12 Jan 2024 06:41    134    |  103    |  63     ----------------------------<  144    3.9     |  28     |  2.70     Ca    8.5        12 Jan 2024 06:41  Phos  4.0       12 Jan 2024 06:41  Mg     2.3       12 Jan 2024 06:41        Urinalysis Basic - ( 12 Jan 2024 06:41 )    Color: x / Appearance: x / SG: x / pH: x  Gluc: 144 mg/dL / Ketone: x  / Bili: x / Urobili: x   Blood: x / Protein: x / Nitrite: x   Leuk Esterase: x / RBC: x / WBC x   Sq Epi: x / Non Sq Epi: x / Bacteria: x      CAPILLARY BLOOD GLUCOSE      POCT Blood Glucose.: 174 mg/dL (12 Jan 2024 12:17)  POCT Blood Glucose.: 124 mg/dL (12 Jan 2024 08:25)  POCT Blood Glucose.: 185 mg/dL (11 Jan 2024 22:29)  POCT Blood Glucose.: 164 mg/dL (11 Jan 2024 18:09)  POCT Blood Glucose.: 181 mg/dL (11 Jan 2024 17:04)        Culture - Blood (collected 01-09-24 @ 01:23)  Source: .Blood Blood  Preliminary Report (01-12-24 @ 07:02):    No growth at 72 Hours    Culture - Blood (collected 01-09-24 @ 01:18)  Source: .Blood Blood  Preliminary Report (01-12-24 @ 07:02):    No growth at 72 Hours    Culture - Urine (collected 01-08-24 @ 04:09)  Source: Clean Catch Clean Catch (Midstream)  Final Report (01-10-24 @ 13:54):    >100,000 CFU/ml Klebsiella pneumoniae  Organism: Klebsiella pneumoniae (01-10-24 @ 13:54)  Organism: Klebsiella pneumoniae (01-10-24 @ 13:54)      -  Levofloxacin: S <=0.5      -  Tobramycin: S <=2      -  Nitrofurantoin: S <=32 Should not be used to treat pyelonephritis      -  Aztreonam: S <=4      -  Gentamicin: S <=2      -  Cefazolin: S <=2 For uncomplicated UTI with K. pneumoniae, E. coli, or P. mirablis: CATRACHITA <=16 is sensitive and CATRACHITA >=32 is resistant. This also predicts results for oral agents cefaclor, cefdinir, cefpodoxime, cefprozil, cefuroxime axetil, cephalexin and locarbef for uncomplicated UTI. Note that some isolates may be susceptible to these agents while testing resistant to cefazolin.      -  Cefepime: S <=2      -  Piperacillin/Tazobactam: S <=8      -  Ciprofloxacin: S <=0.25      -  Imipenem: S <=1      -  Ceftriaxone: S <=1      -  Ampicillin: R <=8 These ampicillin results predict results for amoxicillin      Method Type: CATRACHITA      -  Meropenem: S <=1      -  Ampicillin/Sulbactam: S <=4/2      -  Cefoxitin: S <=8      -  Cefuroxime: S <=4      -  Amoxicillin/Clavulanic Acid: S <=8/4      -  Trimethoprim/Sulfamethoxazole: S <=0.5/9.5      -  Ertapenem: S <=0.5        RADIOLOGY & ADDITIONAL TESTS:    Personally reviewed.     Consultant(s) Notes Reviewed:  [x] YES  [ ] NO

## 2024-01-12 NOTE — DIETITIAN INITIAL EVALUATION ADULT - PERTINENT MEDS FT
MEDICATIONS  (STANDING):  cefuroxime   Tablet 250 milliGRAM(s) Oral every 12 hours  dextrose 5%. 1000 milliLiter(s) (50 mL/Hr) IV Continuous <Continuous>  dextrose 5%. 1000 milliLiter(s) (100 mL/Hr) IV Continuous <Continuous>  dextrose 50% Injectable 25 Gram(s) IV Push once  dextrose 50% Injectable 12.5 Gram(s) IV Push once  dextrose 50% Injectable 25 Gram(s) IV Push once  famotidine  IVPB 20 milliGRAM(s) IV Intermittent daily  glucagon  Injectable 1 milliGRAM(s) IntraMuscular once  heparin   Injectable 5000 Unit(s) SubCutaneous every 8 hours  insulin glargine Injectable (LANTUS) 26 Unit(s) SubCutaneous at bedtime  insulin lispro (ADMELOG) corrective regimen sliding scale   SubCutaneous at bedtime  insulin lispro (ADMELOG) corrective regimen sliding scale   SubCutaneous three times a day before meals  levothyroxine Injectable 56 MICROGram(s) IV Push at bedtime  nystatin Powder 1 Application(s) Topical three times a day  sodium chloride 0.9%. 1000 milliLiter(s) (84 mL/Hr) IV Continuous <Continuous>  triamcinolone 0.1% Cream 1 Application(s) Topical every 12 hours    MEDICATIONS  (PRN):  dextrose Oral Gel 15 Gram(s) Oral once PRN Blood Glucose LESS THAN 70 milliGRAM(s)/deciliter  metoclopramide 10 milliGRAM(s) Oral every 8 hours PRN N/V  trimethobenzamide Injectable 200 milliGRAM(s) IntraMuscular every 8 hours PRN Nausea and/or Vomiting

## 2024-01-12 NOTE — CONSULT NOTE ADULT - ASSESSMENT
72yo F with PMHx T2DM, HFrEF, HTN, hypothyroidism, LBBB, chronic lymphedema, CKD presents to the ED for nausea and vomiting, admitted for UTI and decreased PO intake. Cardiology consulted for HFpEF.    #LBBB  - Pt w/ known LBBB  - EKG showing sinus rhythm w/ 1st degree AV block and non-specific intraventricular block. Rate 62. Relatively unchanged from outpatient EKG in 11/2023    #HTN  - BPs well controlled  - Continue to monitor, especially in setting of poor PO intake and vomiting  - C/w home Metroprolol w/ hold parameters    #HFrEF  - Pt w/ hx of HFrEF, TTE 6/2023 LVEF 38%  - Pt w/ chronic LE edema, currently at baseline per pt  - Holding home diuretics, torsemide and eplerenone, in setting of poor PO intake and increased Cr per nephros recs  - c/w home BB, statin  - Monitor and replete lytes, keep K>4, Mg>2.  - Strict I/Os, daily weights.    #Non-obstructing CAD  - Per chart review, cardiac cath in 2022 showed non-obstructing CAD  - c/w home ASA and statin  - Monitor for any anginal symptoms or clinical signs of ischemia    - Other cardiovascular workup will depend on clinical course.  - All other workup per primary team.  - Will continue to follow.                       74yo F with PMHx T2DM, HFrEF, HTN, hypothyroidism, LBBB, chronic lymphedema, CKD presents to the ED for nausea and vomiting, admitted for UTI and decreased PO intake. Cardiology consulted for HFpEF.    #LBBB, 1st degree AVB, QTc prolongation  - EKG showing sinus rhythm rate 62 bpm w/ 1st degree AV block and non-specific intraventricular block. QTc prolonged at 529ms  - Pt w/ known LBBB, AV block noted in outpatient EKG 11/2023  - Relatively unchanged from previous EKG  - Use caution with AV lulu blockers  - Avoid any QT prolonging medications    #HTN  - BPs well controlled  - Continue to monitor, especially in setting of poor PO intake and vomiting  - C/w home Metroprolol w/ hold parameters    #HFrEF  - TTE 6/2023 LVEF 38%  - Pt seems to have no clinical evidence of volume overload beyond chronic LE edema, which is currently at baseline per pt  - Holding diuresis (on torsemide and eplerenone as outpt) in setting of poor PO intake and increased Cr, w/ close attention to patient's volume status  - c/w home BB, statin  - Monitor and replete lytes, keep K>4, Mg>2.  - Strict I/Os, daily weights.    #Non-obstructing CAD  - Per chart review, cardiac cath in 2022 showed non-obstructing CAD and no intervention indicated at the time  - c/w home ASA and statin  - Monitor for any anginal symptoms or clinical signs of ischemia    - Other cardiovascular workup will depend on clinical course.  - All other workup per primary team.  - Will continue to follow.                       72yo F with PMHx T2DM, HFrEF, HTN, hypothyroidism, LBBB, chronic lymphedema, CKD presents to the ED for nausea and vomiting, admitted for UTI and decreased PO intake. Cardiology consulted for HFpEF.    #LBBB, 1st degree AVB, QTc prolongation  - EKG showing sinus rhythm rate 62 bpm w/ 1st degree AV block and non-specific intraventricular block. QTc prolonged at 529ms  - Pt w/ known LBBB, AV block noted in outpatient EKG 11/2023  - Relatively unchanged from previous EKG  - Use caution with AV lulu blockers  - Avoid any QT prolonging medications    #HTN  - BPs well controlled  - Continue to monitor, especially in setting of poor PO intake and vomiting  - C/w home Metroprolol w/ hold parameters    #HFrEF  - TTE 6/2023 LVEF 38%  - Pt seems to have no clinical evidence of volume overload beyond chronic LE edema, which is currently at baseline per pt  - Holding diuresis (on torsemide and eplerenone as outpt) in setting of poor PO intake and increased Cr, w/ close attention to patient's volume status  - c/w home BB, statin  - Monitor and replete lytes, keep K>4, Mg>2.  - Strict I/Os, daily weights.    #Non-obstructing CAD  - Per chart review, cardiac cath in 2022 showed non-obstructing CAD and no intervention indicated at the time  - c/w home ASA and statin  - Monitor for any anginal symptoms or clinical signs of ischemia    - Other cardiovascular workup will depend on clinical course.  - All other workup per primary team.  - Will continue to follow.

## 2024-01-12 NOTE — CONSULT NOTE ADULT - ATTENDING COMMENTS
74yo F with PMHx T2DM, HFrEF, HTN, hypothyroidism, LBBB, chronic lymphedema, CKD presents to the ED for nausea and vomiting, admitted for UTI and decreased PO intake. Cardiology consulted for HFpEF.    known lbbb  prolonged qt avoid qt prolonging meds  first deg avb, caution with lulu agents  known lv dysfxn , without vol ol  catn 2022 nonobstr cad, no acute ischemia  remainder of care per primary team

## 2024-01-12 NOTE — DIETITIAN INITIAL EVALUATION ADULT - OTHER INFO
Reason for Admission: UTI  History of Present Illness:   74yo F with PMHx T2DM, HFrEF, HTN, hypothyroidism, LBBB, chronic lymphedema, CKD presents to the ED for nausea and vomiting. Pt came to the ED yesterday for nausea and non-bloody emesis, was found to have a UTI, and sent home on Cipro. Pt unable to tolerate PO at home so she came back to the ED. Pt states that partner has been sick at home. No recent travel.  Pt recently admitted to [facility] fo e.coli bacteremia from Aug 11-15.   Of note, Pt additionally admitted in June with septic shock and E. coli bacteremia 2/2 left pyelo with hydro- no obtructive uropathy on CT. She was on pressors and given 10 days of iv rocephin.  weight 319# stable per patient   IV NS 84ml hr  food preferences noted on clear liquids Reason for Admission: UTI  History of Present Illness:   74yo F with PMHx T2DM, HFrEF, HTN, hypothyroidism, LBBB, chronic lymphedema, CKD presents to the ED for nausea and vomiting. Pt came to the ED yesterday for nausea and non-bloody emesis, was found to have a UTI, and sent home on Cipro. Pt unable to tolerate PO at home so she came back to the ED. Pt states that partner has been sick at home. No recent travel.  Pt recently admitted to [facility] fo e.coli bacteremia from Aug 11-15.   Of note, Pt additionally admitted in June with septic shock and E. coli bacteremia 2/2 left pyelo with hydro- no obtructive uropathy on CT. She was on pressors and given 10 days of iv rocephin.  weight 319# stable per patient   IV NS 84ml hr  food preferences noted on clear liquids

## 2024-01-12 NOTE — CONSULT NOTE ADULT - SUBJECTIVE AND OBJECTIVE BOX
[facility]         Shyanne Mueller, Goran, Hoawrd, Cherise, Carlos, Alecia        [phone] (office)    Reason for Consult:    Interval HPI: Patient seen and examined at bedside. Pt currently complaining of mild nausea. Denies chest pain, SOB, palpitations leg swelling. Pt follows Dr. Gonzalez, last visit in November.    HPI:  74yo F with PMHx T2DM, HFrEF, HTN, hypothyroidism, LBBB, chronic lymphedema, CKD presents to the ED for nausea and vomiting. Pt came to the ED yesterday for nausea and non-bloody emesis, was found to have a UTI, and sent home on Cipro. Pt unable to tolerate PO at home so she came back to the ED. Pt states that partner has been sick at home. No recent travel.  Pt recently admitted to [facility] fo e.coli bacteremia from Aug 11-15.   Of note, Pt additionally admitted in June with septic shock and E. coli bacteremia 2/2 left pyelo with hydro- no obtructive uropathy on CT. She was on pressors and given 10 days of iv rocephin.     ED course  Vitals: T 98.2, HR 66, /67, RR 18, SpO2 94% on RA  Significant labs: Cr 2.3, lactate 1.6  UA showed +nitrite, mod leuk est, many bacteria, +SC   Imaging: N/A  In ED patient given: Rocephin x1, 1L NS bolus x1, Zofran x1    (09 Jan 2024 07:09)      PAST MEDICAL & SURGICAL HISTORY:  Hypertension      Diabetes      Lymphedema      H/O left bundle branch block      History of left bundle branch block (LBBB)      Systolic heart failure, chronic      H/O cataract  2020      History of surgical removal of meniscus of knee  left in 1971      Frozen shoulder  2000      H/O Achilles tendon repair  lengthened bilaterally, 2000      Fractured skull  1968          SOCIAL HISTORY: No active tobacco, alcohol or illicit drug use    FAMILY HISTORY:  Family history of CVA  mom, age 57        Home Medications:  aspirin 81 mg oral tablet: 1 tab(s) orally once a day (09 Jan 2024 08:51)  atorvastatin 80 mg oral tablet: 1 tab(s) orally once a day (09 Jan 2024 08:51)  eplerenone 25 mg oral tablet: 1 tab(s) orally once a day (09 Jan 2024 08:51)  ferrous sulfate 324 mg (65 mg elemental iron) oral tablet: 1 tab(s) orally 2 times a day (09 Jan 2024 08:49)  Lantus 100 units/mL subcutaneous solution: 37 unit(s) subcutaneous once a day (at bedtime) (09 Jan 2024 08:51)  levothyroxine 75 mcg (0.075 mg) oral tablet: 1 tab(s) orally once a day (09 Jan 2024 08:51)  metoprolol succinate 100 mg oral capsule, extended release: 1 cap(s) orally once a day (09 Jan 2024 08:50)  NovoLOG 100 units/mL subcutaneous solution: subcutaneous Sliding scale MDD 40units (09 Jan 2024 08:51)  torsemide 20 mg oral tablet: 1 tab(s) orally 2 times a day (09 Jan 2024 08:51)      MEDICATIONS  (STANDING):  cefuroxime   Tablet 250 milliGRAM(s) Oral every 12 hours  dextrose 5%. 1000 milliLiter(s) (100 mL/Hr) IV Continuous <Continuous>  dextrose 5%. 1000 milliLiter(s) (50 mL/Hr) IV Continuous <Continuous>  dextrose 50% Injectable 25 Gram(s) IV Push once  dextrose 50% Injectable 12.5 Gram(s) IV Push once  dextrose 50% Injectable 25 Gram(s) IV Push once  famotidine  IVPB 20 milliGRAM(s) IV Intermittent daily  glucagon  Injectable 1 milliGRAM(s) IntraMuscular once  heparin   Injectable 5000 Unit(s) SubCutaneous every 8 hours  insulin glargine Injectable (LANTUS) 26 Unit(s) SubCutaneous at bedtime  insulin lispro (ADMELOG) corrective regimen sliding scale   SubCutaneous at bedtime  insulin lispro (ADMELOG) corrective regimen sliding scale   SubCutaneous three times a day before meals  levothyroxine Injectable 56 MICROGram(s) IV Push at bedtime  nystatin Powder 1 Application(s) Topical three times a day  sodium chloride 0.9%. 1000 milliLiter(s) (84 mL/Hr) IV Continuous <Continuous>  triamcinolone 0.1% Cream 1 Application(s) Topical every 12 hours    MEDICATIONS  (PRN):  dextrose Oral Gel 15 Gram(s) Oral once PRN Blood Glucose LESS THAN 70 milliGRAM(s)/deciliter  metoclopramide 10 milliGRAM(s) Oral every 8 hours PRN N/V  trimethobenzamide Injectable 200 milliGRAM(s) IntraMuscular every 8 hours PRN Nausea and/or Vomiting      Allergies    penicillins (Hives)  Ativan (Rash; Urticaria)    Intolerances        REVIEW OF SYSTEMS: Negative except as per HPI.    VITAL SIGNS:   Vital Signs Last 24 Hrs  T(C): 36.8 (12 Jan 2024 05:31), Max: 36.8 (12 Jan 2024 05:31)  T(F): 98.2 (12 Jan 2024 05:31), Max: 98.2 (12 Jan 2024 05:31)  HR: 62 (12 Jan 2024 05:31) (58 - 64)  BP: 119/76 (12 Jan 2024 05:31) (119/76 - 155/70)  BP(mean): --  RR: 18 (12 Jan 2024 05:31) (18 - 18)  SpO2: 92% (12 Jan 2024 05:31) (91% - 92%)    Parameters below as of 12 Jan 2024 05:31  Patient On (Oxygen Delivery Method): room air        I&O's Summary    11 Jan 2024 07:01  -  12 Jan 2024 07:00  --------------------------------------------------------  IN: 0 mL / OUT: 150 mL / NET: -150 mL        PHYSICAL EXAM:  Constitutional: NAD, well-developed  HEENT NC/AT, moist mucous membranes  Pulmonary: CTA B/L. No wheezing, crackles  Cardiovascular: +S1/S2, RRR, no murmur  Gastrointestinal: Soft, nontender, nondistended, normoactive bowel sounds  Extremities: +b/l lymphedema, Non-pitting  Neurological: A&Ox3. No focal deficits.  Skin: No obvious lesions/rashes  Psych: Mood & affect appropriate    LABS: All Labs Reviewed:                        11.9   8.09  )-----------( 228      ( 12 Jan 2024 06:41 )             38.2                         11.8   9.53  )-----------( 206      ( 11 Jan 2024 07:55 )             37.9                         12.0   14.39 )-----------( 185      ( 10 Manuel 2024 05:55 )             37.5     12 Jan 2024 06:41    134    |  103    |  63     ----------------------------<  144    3.9     |  28     |  2.70   11 Jan 2024 07:55    133    |  103    |  64     ----------------------------<  177    4.1     |  25     |  3.00   10 Manuel 2024 05:55    134    |  105    |  59     ----------------------------<  201    4.4     |  22     |  2.70     Ca    8.5        12 Jan 2024 06:41  Ca    8.9        11 Jan 2024 07:55  Ca    8.6        10 Manuel 2024 05:55  Phos  4.0       12 Jan 2024 06:41  Phos  4.5       09 Jan 2024 16:40  Mg     2.3       12 Jan 2024 06:41  Mg     2.5       10 Manuel 2024 05:55  Mg     2.4       09 Jan 2024 16:40    TPro  7.1    /  Alb  3.1    /  TBili  0.4    /  DBili  x      /  AST  15     /  ALT  13     /  AlkPhos  60     11 Jan 2024 07:55  TPro  7.4    /  Alb  3.2    /  TBili  0.5    /  DBili  x      /  AST  19     /  ALT  14     /  AlkPhos  69     10 Manuel 2024 05:55          Blood Culture: Organism --  Gram Stain Blood -- Gram Stain --  Specimen Source .Blood Blood  Culture-Blood --    Organism --  Gram Stain Blood -- Gram Stain --  Specimen Source .Blood Blood  Culture-Blood --    Organism Klebsiella pneumoniae  Gram Stain Blood -- Gram Stain --  Specimen Source Clean Catch Clean Catch (Midstream)  Culture-Blood --            EKG: Sinus rhythm, rate 62 bpm. 1st degree AV block and non-specific intraventricular block. QTc 529ms.      CT Abdomen and Pelvis No Cont (01.10.24 @ 08:12)  Small bilateral pleural effusions, larger on the right. Mild cardiomegaly.    No acute intra-abdominal pathology.    New right lower quadrant metallic clip, possibly hemostatic clip in the   cecum. Correlate clinically.       [facility]         Shyanne Mueller, Goran, Howard, Cherise, Carlos, Alecia        [phone] (office)    Reason for Consult:    Interval HPI: Patient seen and examined at bedside. Pt currently complaining of mild nausea. Denies chest pain, SOB, palpitations leg swelling. Pt follows Dr. Gonzalez, last visit in November.    HPI:  74yo F with PMHx T2DM, HFrEF, HTN, hypothyroidism, LBBB, chronic lymphedema, CKD presents to the ED for nausea and vomiting. Pt came to the ED yesterday for nausea and non-bloody emesis, was found to have a UTI, and sent home on Cipro. Pt unable to tolerate PO at home so she came back to the ED. Pt states that partner has been sick at home. No recent travel.  Pt recently admitted to [facility] fo e.coli bacteremia from Aug 11-15.   Of note, Pt additionally admitted in June with septic shock and E. coli bacteremia 2/2 left pyelo with hydro- no obtructive uropathy on CT. She was on pressors and given 10 days of iv rocephin.     ED course  Vitals: T 98.2, HR 66, /67, RR 18, SpO2 94% on RA  Significant labs: Cr 2.3, lactate 1.6  UA showed +nitrite, mod leuk est, many bacteria, +SC   Imaging: N/A  In ED patient given: Rocephin x1, 1L NS bolus x1, Zofran x1    (09 Jan 2024 07:09)      PAST MEDICAL & SURGICAL HISTORY:  Hypertension      Diabetes      Lymphedema      H/O left bundle branch block      History of left bundle branch block (LBBB)      Systolic heart failure, chronic      H/O cataract  2020      History of surgical removal of meniscus of knee  left in 1971      Frozen shoulder  2000      H/O Achilles tendon repair  lengthened bilaterally, 2000      Fractured skull  1968          SOCIAL HISTORY: No active tobacco, alcohol or illicit drug use    FAMILY HISTORY:  Family history of CVA  mom, age 57        Home Medications:  aspirin 81 mg oral tablet: 1 tab(s) orally once a day (09 Jan 2024 08:51)  atorvastatin 80 mg oral tablet: 1 tab(s) orally once a day (09 Jan 2024 08:51)  eplerenone 25 mg oral tablet: 1 tab(s) orally once a day (09 Jan 2024 08:51)  ferrous sulfate 324 mg (65 mg elemental iron) oral tablet: 1 tab(s) orally 2 times a day (09 Jan 2024 08:49)  Lantus 100 units/mL subcutaneous solution: 37 unit(s) subcutaneous once a day (at bedtime) (09 Jan 2024 08:51)  levothyroxine 75 mcg (0.075 mg) oral tablet: 1 tab(s) orally once a day (09 Jan 2024 08:51)  metoprolol succinate 100 mg oral capsule, extended release: 1 cap(s) orally once a day (09 Jan 2024 08:50)  NovoLOG 100 units/mL subcutaneous solution: subcutaneous Sliding scale MDD 40units (09 Jan 2024 08:51)  torsemide 20 mg oral tablet: 1 tab(s) orally 2 times a day (09 Jan 2024 08:51)      MEDICATIONS  (STANDING):  cefuroxime   Tablet 250 milliGRAM(s) Oral every 12 hours  dextrose 5%. 1000 milliLiter(s) (100 mL/Hr) IV Continuous <Continuous>  dextrose 5%. 1000 milliLiter(s) (50 mL/Hr) IV Continuous <Continuous>  dextrose 50% Injectable 25 Gram(s) IV Push once  dextrose 50% Injectable 12.5 Gram(s) IV Push once  dextrose 50% Injectable 25 Gram(s) IV Push once  famotidine  IVPB 20 milliGRAM(s) IV Intermittent daily  glucagon  Injectable 1 milliGRAM(s) IntraMuscular once  heparin   Injectable 5000 Unit(s) SubCutaneous every 8 hours  insulin glargine Injectable (LANTUS) 26 Unit(s) SubCutaneous at bedtime  insulin lispro (ADMELOG) corrective regimen sliding scale   SubCutaneous at bedtime  insulin lispro (ADMELOG) corrective regimen sliding scale   SubCutaneous three times a day before meals  levothyroxine Injectable 56 MICROGram(s) IV Push at bedtime  nystatin Powder 1 Application(s) Topical three times a day  sodium chloride 0.9%. 1000 milliLiter(s) (84 mL/Hr) IV Continuous <Continuous>  triamcinolone 0.1% Cream 1 Application(s) Topical every 12 hours    MEDICATIONS  (PRN):  dextrose Oral Gel 15 Gram(s) Oral once PRN Blood Glucose LESS THAN 70 milliGRAM(s)/deciliter  metoclopramide 10 milliGRAM(s) Oral every 8 hours PRN N/V  trimethobenzamide Injectable 200 milliGRAM(s) IntraMuscular every 8 hours PRN Nausea and/or Vomiting      Allergies    penicillins (Hives)  Ativan (Rash; Urticaria)    Intolerances        REVIEW OF SYSTEMS: Negative except as per HPI.    VITAL SIGNS:   Vital Signs Last 24 Hrs  T(C): 36.8 (12 Jan 2024 05:31), Max: 36.8 (12 Jan 2024 05:31)  T(F): 98.2 (12 Jan 2024 05:31), Max: 98.2 (12 Jan 2024 05:31)  HR: 62 (12 Jan 2024 05:31) (58 - 64)  BP: 119/76 (12 Jan 2024 05:31) (119/76 - 155/70)  BP(mean): --  RR: 18 (12 Jan 2024 05:31) (18 - 18)  SpO2: 92% (12 Jan 2024 05:31) (91% - 92%)    Parameters below as of 12 Jan 2024 05:31  Patient On (Oxygen Delivery Method): room air        I&O's Summary    11 Jan 2024 07:01  -  12 Jan 2024 07:00  --------------------------------------------------------  IN: 0 mL / OUT: 150 mL / NET: -150 mL        PHYSICAL EXAM:  Constitutional: NAD, well-developed  HEENT NC/AT, moist mucous membranes  Pulmonary: CTA B/L. No wheezing, crackles  Cardiovascular: +S1/S2, RRR, no murmur  Gastrointestinal: Soft, nontender, nondistended, normoactive bowel sounds  Extremities: +b/l lymphedema, Non-pitting  Neurological: A&Ox3. No focal deficits.  Skin: No obvious lesions/rashes  Psych: Mood & affect appropriate    LABS: All Labs Reviewed:                        11.9   8.09  )-----------( 228      ( 12 Jan 2024 06:41 )             38.2                         11.8   9.53  )-----------( 206      ( 11 Jan 2024 07:55 )             37.9                         12.0   14.39 )-----------( 185      ( 10 Manuel 2024 05:55 )             37.5     12 Jan 2024 06:41    134    |  103    |  63     ----------------------------<  144    3.9     |  28     |  2.70   11 Jan 2024 07:55    133    |  103    |  64     ----------------------------<  177    4.1     |  25     |  3.00   10 Manuel 2024 05:55    134    |  105    |  59     ----------------------------<  201    4.4     |  22     |  2.70     Ca    8.5        12 Jan 2024 06:41  Ca    8.9        11 Jan 2024 07:55  Ca    8.6        10 Manuel 2024 05:55  Phos  4.0       12 Jan 2024 06:41  Phos  4.5       09 Jan 2024 16:40  Mg     2.3       12 Jan 2024 06:41  Mg     2.5       10 Manuel 2024 05:55  Mg     2.4       09 Jan 2024 16:40    TPro  7.1    /  Alb  3.1    /  TBili  0.4    /  DBili  x      /  AST  15     /  ALT  13     /  AlkPhos  60     11 Jan 2024 07:55  TPro  7.4    /  Alb  3.2    /  TBili  0.5    /  DBili  x      /  AST  19     /  ALT  14     /  AlkPhos  69     10 Manuel 2024 05:55          Blood Culture: Organism --  Gram Stain Blood -- Gram Stain --  Specimen Source .Blood Blood  Culture-Blood --    Organism --  Gram Stain Blood -- Gram Stain --  Specimen Source .Blood Blood  Culture-Blood --    Organism Klebsiella pneumoniae  Gram Stain Blood -- Gram Stain --  Specimen Source Clean Catch Clean Catch (Midstream)  Culture-Blood --            EKG: Sinus rhythm, rate 62 bpm. 1st degree AV block and non-specific intraventricular block. QTc 529ms.      CT Abdomen and Pelvis No Cont (01.10.24 @ 08:12)  Small bilateral pleural effusions, larger on the right. Mild cardiomegaly.    No acute intra-abdominal pathology.    New right lower quadrant metallic clip, possibly hemostatic clip in the   cecum. Correlate clinically.       [facility]         Shyanne Mueller, Goran, Howard, Cherise, Carlos, Alecia        [phone] (office)    Reason for Consult:    Interval HPI: Patient seen and examined at bedside. Pt currently complaining of mild nausea. Denies chest pain, SOB, palpitations leg swelling. Pt follows Dr. Nathan, last visit in November.    HPI:  74yo F with PMHx T2DM, HFrEF, HTN, hypothyroidism, LBBB, chronic lymphedema, CKD presents to the ED for nausea and vomiting. Pt came to the ED yesterday for nausea and non-bloody emesis, was found to have a UTI, and sent home on Cipro. Pt unable to tolerate PO at home so she came back to the ED. Pt states that partner has been sick at home. No recent travel.  Pt recently admitted to [facility] fo e.coli bacteremia from Aug 11-15.   Of note, Pt additionally admitted in June with septic shock and E. coli bacteremia 2/2 left pyelo with hydro- no obtructive uropathy on CT. She was on pressors and given 10 days of iv rocephin.     ED course  Vitals: T 98.2, HR 66, /67, RR 18, SpO2 94% on RA  Significant labs: Cr 2.3, lactate 1.6  UA showed +nitrite, mod leuk est, many bacteria, +SC   Imaging: N/A  In ED patient given: Rocephin x1, 1L NS bolus x1, Zofran x1    (09 Jan 2024 07:09)      PAST MEDICAL & SURGICAL HISTORY:  Hypertension      Diabetes      Lymphedema      H/O left bundle branch block      History of left bundle branch block (LBBB)      Systolic heart failure, chronic      H/O cataract  2020      History of surgical removal of meniscus of knee  left in 1971      Frozen shoulder  2000      H/O Achilles tendon repair  lengthened bilaterally, 2000      Fractured skull  1968          SOCIAL HISTORY: No active tobacco, alcohol or illicit drug use    FAMILY HISTORY:  Family history of CVA  mom, age 57        Home Medications:  aspirin 81 mg oral tablet: 1 tab(s) orally once a day (09 Jan 2024 08:51)  atorvastatin 80 mg oral tablet: 1 tab(s) orally once a day (09 Jan 2024 08:51)  eplerenone 25 mg oral tablet: 1 tab(s) orally once a day (09 Jan 2024 08:51)  ferrous sulfate 324 mg (65 mg elemental iron) oral tablet: 1 tab(s) orally 2 times a day (09 Jan 2024 08:49)  Lantus 100 units/mL subcutaneous solution: 37 unit(s) subcutaneous once a day (at bedtime) (09 Jan 2024 08:51)  levothyroxine 75 mcg (0.075 mg) oral tablet: 1 tab(s) orally once a day (09 Jan 2024 08:51)  metoprolol succinate 100 mg oral capsule, extended release: 1 cap(s) orally once a day (09 Jan 2024 08:50)  NovoLOG 100 units/mL subcutaneous solution: subcutaneous Sliding scale MDD 40units (09 Jan 2024 08:51)  torsemide 20 mg oral tablet: 1 tab(s) orally 2 times a day (09 Jan 2024 08:51)      MEDICATIONS  (STANDING):  cefuroxime   Tablet 250 milliGRAM(s) Oral every 12 hours  dextrose 5%. 1000 milliLiter(s) (100 mL/Hr) IV Continuous <Continuous>  dextrose 5%. 1000 milliLiter(s) (50 mL/Hr) IV Continuous <Continuous>  dextrose 50% Injectable 25 Gram(s) IV Push once  dextrose 50% Injectable 12.5 Gram(s) IV Push once  dextrose 50% Injectable 25 Gram(s) IV Push once  famotidine  IVPB 20 milliGRAM(s) IV Intermittent daily  glucagon  Injectable 1 milliGRAM(s) IntraMuscular once  heparin   Injectable 5000 Unit(s) SubCutaneous every 8 hours  insulin glargine Injectable (LANTUS) 26 Unit(s) SubCutaneous at bedtime  insulin lispro (ADMELOG) corrective regimen sliding scale   SubCutaneous at bedtime  insulin lispro (ADMELOG) corrective regimen sliding scale   SubCutaneous three times a day before meals  levothyroxine Injectable 56 MICROGram(s) IV Push at bedtime  nystatin Powder 1 Application(s) Topical three times a day  sodium chloride 0.9%. 1000 milliLiter(s) (84 mL/Hr) IV Continuous <Continuous>  triamcinolone 0.1% Cream 1 Application(s) Topical every 12 hours    MEDICATIONS  (PRN):  dextrose Oral Gel 15 Gram(s) Oral once PRN Blood Glucose LESS THAN 70 milliGRAM(s)/deciliter  metoclopramide 10 milliGRAM(s) Oral every 8 hours PRN N/V  trimethobenzamide Injectable 200 milliGRAM(s) IntraMuscular every 8 hours PRN Nausea and/or Vomiting      Allergies    penicillins (Hives)  Ativan (Rash; Urticaria)    Intolerances        REVIEW OF SYSTEMS: Negative except as per HPI.    VITAL SIGNS:   Vital Signs Last 24 Hrs  T(C): 36.8 (12 Jan 2024 05:31), Max: 36.8 (12 Jan 2024 05:31)  T(F): 98.2 (12 Jan 2024 05:31), Max: 98.2 (12 Jan 2024 05:31)  HR: 62 (12 Jan 2024 05:31) (58 - 64)  BP: 119/76 (12 Jan 2024 05:31) (119/76 - 155/70)  BP(mean): --  RR: 18 (12 Jan 2024 05:31) (18 - 18)  SpO2: 92% (12 Jan 2024 05:31) (91% - 92%)    Parameters below as of 12 Jan 2024 05:31  Patient On (Oxygen Delivery Method): room air        I&O's Summary    11 Jan 2024 07:01  -  12 Jan 2024 07:00  --------------------------------------------------------  IN: 0 mL / OUT: 150 mL / NET: -150 mL        PHYSICAL EXAM:  Constitutional: NAD, well-developed  HEENT NC/AT, moist mucous membranes  Pulmonary: CTA B/L. No wheezing, crackles  Cardiovascular: +systolic murmur at LUSB. +S1/S2, RRR.  Gastrointestinal: Soft, nontender, nondistended, normoactive bowel sounds  Extremities: +b/l lymphedema, Non-pitting. +chronic cellulitic rash b/l  Neurological: A&Ox3. No focal deficits.  Skin: No obvious lesions/rashes  Psych: Mood & affect appropriate    LABS: All Labs Reviewed:                        11.9   8.09  )-----------( 228      ( 12 Jan 2024 06:41 )             38.2                         11.8   9.53  )-----------( 206      ( 11 Jan 2024 07:55 )             37.9                         12.0   14.39 )-----------( 185      ( 10 Manuel 2024 05:55 )             37.5     12 Jan 2024 06:41    134    |  103    |  63     ----------------------------<  144    3.9     |  28     |  2.70   11 Jan 2024 07:55    133    |  103    |  64     ----------------------------<  177    4.1     |  25     |  3.00   10 Manuel 2024 05:55    134    |  105    |  59     ----------------------------<  201    4.4     |  22     |  2.70     Ca    8.5        12 Jan 2024 06:41  Ca    8.9        11 Jan 2024 07:55  Ca    8.6        10 Manuel 2024 05:55  Phos  4.0       12 Jan 2024 06:41  Phos  4.5       09 Jan 2024 16:40  Mg     2.3       12 Jan 2024 06:41  Mg     2.5       10 Manuel 2024 05:55  Mg     2.4       09 Jan 2024 16:40    TPro  7.1    /  Alb  3.1    /  TBili  0.4    /  DBili  x      /  AST  15     /  ALT  13     /  AlkPhos  60     11 Jan 2024 07:55  TPro  7.4    /  Alb  3.2    /  TBili  0.5    /  DBili  x      /  AST  19     /  ALT  14     /  AlkPhos  69     10 Manuel 2024 05:55          Blood Culture: Organism --  Gram Stain Blood -- Gram Stain --  Specimen Source .Blood Blood  Culture-Blood --    Organism --  Gram Stain Blood -- Gram Stain --  Specimen Source .Blood Blood  Culture-Blood --    Organism Klebsiella pneumoniae  Gram Stain Blood -- Gram Stain --  Specimen Source Clean Catch Clean Catch (Midstream)  Culture-Blood --            EKG: Sinus rhythm, rate 62 bpm. 1st degree AV block and non-specific intraventricular block. QTc 529ms.      CT Abdomen and Pelvis No Cont (01.10.24 @ 08:12)  Small bilateral pleural effusions, larger on the right. Mild cardiomegaly.    No acute intra-abdominal pathology.    New right lower quadrant metallic clip, possibly hemostatic clip in the   cecum. Correlate clinically.       [facility]         Shyanne Mueller, Goran, Howard, Cherise, Carlos, Alecia        [phone] (office)    Reason for Consult:    Interval HPI: Patient seen and examined at bedside. Pt currently complaining of mild nausea. Denies chest pain, SOB, palpitations leg swelling. Pt follows Dr. Nathan, last visit in November.    HPI:  72yo F with PMHx T2DM, HFrEF, HTN, hypothyroidism, LBBB, chronic lymphedema, CKD presents to the ED for nausea and vomiting. Pt came to the ED yesterday for nausea and non-bloody emesis, was found to have a UTI, and sent home on Cipro. Pt unable to tolerate PO at home so she came back to the ED. Pt states that partner has been sick at home. No recent travel.  Pt recently admitted to [facility]"" fo e.coli bacteremia from Aug 11-15.   Of note, Pt additionally admitted in June with septic shock and E. coli bacteremia 2/2 left pyelo with hydro- no obtructive uropathy on CT. She was on pressors and given 10 days of iv rocephin.     ED course  Vitals: T 98.2, HR 66, /67, RR 18, SpO2 94% on RA  Significant labs: Cr 2.3, lactate 1.6  UA showed +nitrite, mod leuk est, many bacteria, +SC   Imaging: N/A  In ED patient given: Rocephin x1, 1L NS bolus x1, Zofran x1    (09 Jan 2024 07:09)      PAST MEDICAL & SURGICAL HISTORY:  Hypertension      Diabetes      Lymphedema      H/O left bundle branch block      History of left bundle branch block (LBBB)      Systolic heart failure, chronic      H/O cataract  2020      History of surgical removal of meniscus of knee  left in 1971      Frozen shoulder  2000      H/O Achilles tendon repair  lengthened bilaterally, 2000      Fractured skull  1968          SOCIAL HISTORY: No active tobacco, alcohol or illicit drug use    FAMILY HISTORY:  Family history of CVA  mom, age 57        Home Medications:  aspirin 81 mg oral tablet: 1 tab(s) orally once a day (09 Jan 2024 08:51)  atorvastatin 80 mg oral tablet: 1 tab(s) orally once a day (09 Jan 2024 08:51)  eplerenone 25 mg oral tablet: 1 tab(s) orally once a day (09 Jan 2024 08:51)  ferrous sulfate 324 mg (65 mg elemental iron) oral tablet: 1 tab(s) orally 2 times a day (09 Jan 2024 08:49)  Lantus 100 units/mL subcutaneous solution: 37 unit(s) subcutaneous once a day (at bedtime) (09 Jan 2024 08:51)  levothyroxine 75 mcg (0.075 mg) oral tablet: 1 tab(s) orally once a day (09 Jan 2024 08:51)  metoprolol succinate 100 mg oral capsule, extended release: 1 cap(s) orally once a day (09 Jan 2024 08:50)  NovoLOG 100 units/mL subcutaneous solution: subcutaneous Sliding scale MDD 40units (09 Jan 2024 08:51)  torsemide 20 mg oral tablet: 1 tab(s) orally 2 times a day (09 Jan 2024 08:51)      MEDICATIONS  (STANDING):  cefuroxime   Tablet 250 milliGRAM(s) Oral every 12 hours  dextrose 5%. 1000 milliLiter(s) (100 mL/Hr) IV Continuous <Continuous>  dextrose 5%. 1000 milliLiter(s) (50 mL/Hr) IV Continuous <Continuous>  dextrose 50% Injectable 25 Gram(s) IV Push once  dextrose 50% Injectable 12.5 Gram(s) IV Push once  dextrose 50% Injectable 25 Gram(s) IV Push once  famotidine  IVPB 20 milliGRAM(s) IV Intermittent daily  glucagon  Injectable 1 milliGRAM(s) IntraMuscular once  heparin   Injectable 5000 Unit(s) SubCutaneous every 8 hours  insulin glargine Injectable (LANTUS) 26 Unit(s) SubCutaneous at bedtime  insulin lispro (ADMELOG) corrective regimen sliding scale   SubCutaneous at bedtime  insulin lispro (ADMELOG) corrective regimen sliding scale   SubCutaneous three times a day before meals  levothyroxine Injectable 56 MICROGram(s) IV Push at bedtime  nystatin Powder 1 Application(s) Topical three times a day  sodium chloride 0.9%. 1000 milliLiter(s) (84 mL/Hr) IV Continuous <Continuous>  triamcinolone 0.1% Cream 1 Application(s) Topical every 12 hours    MEDICATIONS  (PRN):  dextrose Oral Gel 15 Gram(s) Oral once PRN Blood Glucose LESS THAN 70 milliGRAM(s)/deciliter  metoclopramide 10 milliGRAM(s) Oral every 8 hours PRN N/V  trimethobenzamide Injectable 200 milliGRAM(s) IntraMuscular every 8 hours PRN Nausea and/or Vomiting      Allergies    penicillins (Hives)  Ativan (Rash; Urticaria)    Intolerances        REVIEW OF SYSTEMS: Negative except as per HPI.    VITAL SIGNS:   Vital Signs Last 24 Hrs  T(C): 36.8 (12 Jan 2024 05:31), Max: 36.8 (12 Jan 2024 05:31)  T(F): 98.2 (12 Jan 2024 05:31), Max: 98.2 (12 Jan 2024 05:31)  HR: 62 (12 Jan 2024 05:31) (58 - 64)  BP: 119/76 (12 Jan 2024 05:31) (119/76 - 155/70)  BP(mean): --  RR: 18 (12 Jan 2024 05:31) (18 - 18)  SpO2: 92% (12 Jan 2024 05:31) (91% - 92%)    Parameters below as of 12 Jan 2024 05:31  Patient On (Oxygen Delivery Method): room air        I&O's Summary    11 Jan 2024 07:01  -  12 Jan 2024 07:00  --------------------------------------------------------  IN: 0 mL / OUT: 150 mL / NET: -150 mL        PHYSICAL EXAM:  Constitutional: NAD, well-developed  HEENT NC/AT, moist mucous membranes  Pulmonary: CTA B/L. No wheezing, crackles  Cardiovascular: +systolic murmur at LUSB. +S1/S2, RRR.  Gastrointestinal: Soft, nontender, nondistended, normoactive bowel sounds  Extremities: +b/l lymphedema, Non-pitting. +chronic cellulitic rash b/l  Neurological: A&Ox3. No focal deficits.  Skin: No obvious lesions/rashes  Psych: Mood & affect appropriate    LABS: All Labs Reviewed:                        11.9   8.09  )-----------( 228      ( 12 Jan 2024 06:41 )             38.2                         11.8   9.53  )-----------( 206      ( 11 Jan 2024 07:55 )             37.9                         12.0   14.39 )-----------( 185      ( 10 Manuel 2024 05:55 )             37.5     12 Jan 2024 06:41    134    |  103    |  63     ----------------------------<  144    3.9     |  28     |  2.70   11 Jan 2024 07:55    133    |  103    |  64     ----------------------------<  177    4.1     |  25     |  3.00   10 Manuel 2024 05:55    134    |  105    |  59     ----------------------------<  201    4.4     |  22     |  2.70     Ca    8.5        12 Jan 2024 06:41  Ca    8.9        11 Jan 2024 07:55  Ca    8.6        10 Manuel 2024 05:55  Phos  4.0       12 Jan 2024 06:41  Phos  4.5       09 Jan 2024 16:40  Mg     2.3       12 Jan 2024 06:41  Mg     2.5       10 Manuel 2024 05:55  Mg     2.4       09 Jan 2024 16:40    TPro  7.1    /  Alb  3.1    /  TBili  0.4    /  DBili  x      /  AST  15     /  ALT  13     /  AlkPhos  60     11 Jan 2024 07:55  TPro  7.4    /  Alb  3.2    /  TBili  0.5    /  DBili  x      /  AST  19     /  ALT  14     /  AlkPhos  69     10 Manuel 2024 05:55          Blood Culture: Organism --  Gram Stain Blood -- Gram Stain --  Specimen Source .Blood Blood  Culture-Blood --    Organism --  Gram Stain Blood -- Gram Stain --  Specimen Source .Blood Blood  Culture-Blood --    Organism Klebsiella pneumoniae  Gram Stain Blood -- Gram Stain --  Specimen Source Clean Catch Clean Catch (Midstream)  Culture-Blood --            EKG: Sinus rhythm, rate 62 bpm. 1st degree AV block and non-specific intraventricular block. QTc 529ms.      CT Abdomen and Pelvis No Cont (01.10.24 @ 08:12)  Small bilateral pleural effusions, larger on the right. Mild cardiomegaly.    No acute intra-abdominal pathology.    New right lower quadrant metallic clip, possibly hemostatic clip in the   cecum. Correlate clinically.

## 2024-01-12 NOTE — DIETITIAN NUTRITION RISK NOTIFICATION - TREATMENT: THE FOLLOWING DIET HAS BEEN RECOMMENDED
Diet, Clear Liquid:   Consistent Carbohydrate {Evening Snack}  DASH/TLC {Sodium & Cholesterol Restricted} (01-09-24 @ 08:24) [Active]

## 2024-01-12 NOTE — PROGRESS NOTE ADULT - PROBLEM SELECTOR PLAN 1
- UA showed +nitrite, mod leuk esterase, many bacteria, +SC  - Sp Rocephin--> Ceftin x 4 days   - UCx 1/10 with >100k Klebsiella S to ceftriaxone  - Monitor for leukocytosis  - ID Dr. Clark consulted, recs appreciated  - S/P midline due to difficulty with IV's

## 2024-01-12 NOTE — PROGRESS NOTE ADULT - ASSESSMENT
Acute on CKD 4  HTN  N/V  LE Edema  Hyponatremia    -Baseline Creatinine 2.1  -Renal function worsened  -BP controlled  -Cont IVF  -Has chronic LE edema, but will hold diuretic at this time as she has had poor intake with N/V  -Daily chem; monitor trend in Cr for now; check bladder scan  -Increased IVF and change to NS with hyponatremia  -DC lasix for now, monitor respiratory status  -Creatinine improving

## 2024-01-12 NOTE — PROGRESS NOTE ADULT - PROBLEM SELECTOR PLAN 4
Chronic   - Home regimen: lantus 37 units qhs + sliding scale   - will continue lantus 26 units at this time   - LDISS   - accuchecks   - Hypoglycemia protocol
Chronic   - Home regimen: lantus 37 units qhs + sliding scale   - will continue lantus 26 units at this time   - LDISS   - accuchecks   - Hypoglycemia protocol
Chronic   - Home regimen: lantus 37 units qhs + sliding scale   - will continue lantus 26 units at this time   - LDSS   - accuchecks   - Hypoglycemia protocol
Chronic   - Home regimen: lantus 37 units qhs + sliding scale   - will continue lantus 26 units at this time   - LDISS   - accuchecks   - Hypoglycemia protocol

## 2024-01-12 NOTE — PROGRESS NOTE ADULT - PROBLEM SELECTOR PLAN 6
Chronic   - resume home statin when able
Chronic   - resume home statin when able
Chronic   - continue Atorvastatin
Chronic   - resume home statin when able

## 2024-01-12 NOTE — DIETITIAN INITIAL EVALUATION ADULT - PERTINENT LABORATORY DATA
01-12    134<L>  |  103  |  63<H>  ----------------------------<  144<H>  3.9   |  28  |  2.70<H>    Ca    8.5      12 Jan 2024 06:41  Phos  4.0     01-12  Mg     2.3     01-12    TPro  7.1  /  Alb  3.1<L>  /  TBili  0.4  /  DBili  x   /  AST  15  /  ALT  13  /  AlkPhos  60  01-11  POCT Blood Glucose.: 174 mg/dL (01-12-24 @ 12:17)  A1C with Estimated Average Glucose Result: 6.8 % (01-10-24 @ 08:37)  A1C with Estimated Average Glucose Result: 7.3 % (08-12-23 @ 07:58)  A1C with Estimated Average Glucose Result: 8.2 % (06-05-23 @ 06:10)

## 2024-01-12 NOTE — PROGRESS NOTE ADULT - NUTRITIONAL ASSESSMENT
This patient has been assessed with a concern for Malnutrition and has been determined to have a diagnosis/diagnoses of Morbid obesity (BMI > 40).    This patient is being managed with:   Diet Clear Liquid-  Consistent Carbohydrate {Evening Snack}  DASH/TLC {Sodium & Cholesterol Restricted}  Entered: Jan 9 2024  8:22AM

## 2024-01-12 NOTE — PROGRESS NOTE ADULT - SUBJECTIVE AND OBJECTIVE BOX
Patient is a 73y old  Female who presents with a chief complaint of UTI (09 Jan 2024 16:52)       HPI:  74yo F with PMHx T2DM, HFrEF, HTN, hypothyroidism, LBBB, chronic lymphedema, CKD presents to the ED for nausea and vomiting. Pt came to the ED yesterday for nausea and non-bloody emesis, was found to have a UTI, and sent home on Cipro. Pt unable to tolerate PO at home so she came back to the ED. Pt states that partner has been sick at home. No recent travel.  Pt recently admitted to [facility] fo e.coli bacteremia from Aug 11-15.   Of note, Pt additionally admitted in June with septic shock and E. coli bacteremia 2/2 left pyelo with hydro- no obtructive uropathy on CT. She was on pressors and given 10 days of iv rocephin.   Sees Dr. Wilde for outpatient nephrologist.  Was previously on dialysis but was able to come off. Has had decreased PO intake and N/V. + sick contacts.       Still with nausea but improved. NO SOB.  Urinating     PAST MEDICAL & SURGICAL HISTORY:  Hypertension      Diabetes      Lymphedema      H/O left bundle branch block      History of left bundle branch block (LBBB)      Systolic heart failure, chronic      H/O cataract  2020      History of surgical removal of meniscus of knee  left in 1971      Frozen shoulder  2000      H/O Achilles tendon repair  lengthened bilaterally, 2000      Fractured skull  1968           FAMILY HISTORY:  Family history of CVA  mom, age 57    [place]    Social History:Non smoker    MEDICATIONS  (STANDING):  cefTRIAXone   IVPB 1000 milliGRAM(s) IV Intermittent every 24 hours  dextrose 5%. 1000 milliLiter(s) (50 mL/Hr) IV Continuous <Continuous>  dextrose 5%. 1000 milliLiter(s) (100 mL/Hr) IV Continuous <Continuous>  dextrose 50% Injectable 25 Gram(s) IV Push once  dextrose 50% Injectable 25 Gram(s) IV Push once  dextrose 50% Injectable 12.5 Gram(s) IV Push once  famotidine  IVPB 20 milliGRAM(s) IV Intermittent daily  furosemide   Injectable 20 milliGRAM(s) IV Push two times a day  glucagon  Injectable 1 milliGRAM(s) IntraMuscular once  heparin   Injectable 5000 Unit(s) SubCutaneous every 8 hours  insulin glargine Injectable (LANTUS) 26 Unit(s) SubCutaneous at bedtime  insulin lispro (ADMELOG) corrective regimen sliding scale   SubCutaneous three times a day before meals  insulin lispro (ADMELOG) corrective regimen sliding scale   SubCutaneous at bedtime  lactated ringers. 1000 milliLiter(s) (70 mL/Hr) IV Continuous <Continuous>  levothyroxine Injectable 56 MICROGram(s) IV Push at bedtime    MEDICATIONS  (PRN):  dextrose Oral Gel 15 Gram(s) Oral once PRN Blood Glucose LESS THAN 70 milliGRAM(s)/deciliter  trimethobenzamide Injectable 200 milliGRAM(s) IntraMuscular every 8 hours PRN Nausea and/or Vomiting   Meds reviewed    Allergies    penicillins (Hives)  Ativan (Rash; Urticaria)    Intolerances         REVIEW OF SYSTEMS:  as above      ICU Vital Signs Last 24 Hrs  T(C): 36.7 (12 Jan 2024 12:30), Max: 36.8 (12 Jan 2024 05:31)  T(F): 98 (12 Jan 2024 12:30), Max: 98.2 (12 Jan 2024 05:31)  HR: 61 (12 Jan 2024 12:30) (61 - 64)  BP: 147/76 (12 Jan 2024 12:30) (119/76 - 147/76)  BP(mean): --  ABP: --  ABP(mean): --  RR: 18 (12 Jan 2024 12:30) (18 - 18)  SpO2: 92% (12 Jan 2024 12:30) (91% - 92%)    O2 Parameters below as of 12 Jan 2024 05:31  Patient On (Oxygen Delivery Method): room air            PHYSICAL EXAM:    GENERAL: ill appearing  HEAD:  Atraumatic, Normocephalic  EYES: EOMI, conjunctiva and sclera clear  ENMT: No Drainage from nares, No drainage from ears  NERVOUS SYSTEM:  Awake and Alert  CHEST/LUNG: Clear to percussion bilaterally  EXTREMITIES:  ++Edema  SKIN: No rashes No obvious ecchymosis      LABS:                                   11.9   8.09  )-----------( 228      ( 12 Jan 2024 06:41 )             38.2     01-12    134<L>  |  103  |  63<H>  ----------------------------<  144<H>  3.9   |  28  |  2.70<H>    Ca    8.5      12 Jan 2024 06:41  Phos  4.0     01-12  Mg     2.3     01-12    TPro  7.1  /  Alb  3.1<L>  /  TBili  0.4  /  DBili  x   /  AST  15  /  ALT  13  /  AlkPhos  60  01-11      Urinalysis Basic - ( 12 Jan 2024 06:41 )    Color: x / Appearance: x / SG: x / pH: x  Gluc: 144 mg/dL / Ketone: x  / Bili: x / Urobili: x   Blood: x / Protein: x / Nitrite: x   Leuk Esterase: x / RBC: x / WBC x   Sq Epi: x / Non Sq Epi: x / Bacteria: x               Patient is a 73y old  Female who presents with a chief complaint of UTI (09 Jan 2024 16:52)       HPI:  72yo F with PMHx T2DM, HFrEF, HTN, hypothyroidism, LBBB, chronic lymphedema, CKD presents to the ED for nausea and vomiting. Pt came to the ED yesterday for nausea and non-bloody emesis, was found to have a UTI, and sent home on Cipro. Pt unable to tolerate PO at home so she came back to the ED. Pt states that partner has been sick at home. No recent travel.  Pt recently admitted to [facility] fo e.coli bacteremia from Aug 11-15.   Of note, Pt additionally admitted in June with septic shock and E. coli bacteremia 2/2 left pyelo with hydro- no obtructive uropathy on CT. She was on pressors and given 10 days of iv rocephin.   Sees Dr. Wilde for outpatient nephrologist.  Was previously on dialysis but was able to come off. Has had decreased PO intake and N/V. + sick contacts.       Still with nausea but improved. NO SOB.  Urinating     PAST MEDICAL & SURGICAL HISTORY:  Hypertension      Diabetes      Lymphedema      H/O left bundle branch block      History of left bundle branch block (LBBB)      Systolic heart failure, chronic      H/O cataract  2020      History of surgical removal of meniscus of knee  left in 1971      Frozen shoulder  2000      H/O Achilles tendon repair  lengthened bilaterally, 2000      Fractured skull  1968           FAMILY HISTORY:  Family history of CVA  mom, age 57    [place]    Social History:Non smoker    MEDICATIONS  (STANDING):  cefTRIAXone   IVPB 1000 milliGRAM(s) IV Intermittent every 24 hours  dextrose 5%. 1000 milliLiter(s) (50 mL/Hr) IV Continuous <Continuous>  dextrose 5%. 1000 milliLiter(s) (100 mL/Hr) IV Continuous <Continuous>  dextrose 50% Injectable 25 Gram(s) IV Push once  dextrose 50% Injectable 25 Gram(s) IV Push once  dextrose 50% Injectable 12.5 Gram(s) IV Push once  famotidine  IVPB 20 milliGRAM(s) IV Intermittent daily  furosemide   Injectable 20 milliGRAM(s) IV Push two times a day  glucagon  Injectable 1 milliGRAM(s) IntraMuscular once  heparin   Injectable 5000 Unit(s) SubCutaneous every 8 hours  insulin glargine Injectable (LANTUS) 26 Unit(s) SubCutaneous at bedtime  insulin lispro (ADMELOG) corrective regimen sliding scale   SubCutaneous three times a day before meals  insulin lispro (ADMELOG) corrective regimen sliding scale   SubCutaneous at bedtime  lactated ringers. 1000 milliLiter(s) (70 mL/Hr) IV Continuous <Continuous>  levothyroxine Injectable 56 MICROGram(s) IV Push at bedtime    MEDICATIONS  (PRN):  dextrose Oral Gel 15 Gram(s) Oral once PRN Blood Glucose LESS THAN 70 milliGRAM(s)/deciliter  trimethobenzamide Injectable 200 milliGRAM(s) IntraMuscular every 8 hours PRN Nausea and/or Vomiting   Meds reviewed    Allergies    penicillins (Hives)  Ativan (Rash; Urticaria)    Intolerances         REVIEW OF SYSTEMS:  as above      ICU Vital Signs Last 24 Hrs  T(C): 36.7 (12 Jan 2024 12:30), Max: 36.8 (12 Jan 2024 05:31)  T(F): 98 (12 Jan 2024 12:30), Max: 98.2 (12 Jan 2024 05:31)  HR: 61 (12 Jan 2024 12:30) (61 - 64)  BP: 147/76 (12 Jan 2024 12:30) (119/76 - 147/76)  BP(mean): --  ABP: --  ABP(mean): --  RR: 18 (12 Jan 2024 12:30) (18 - 18)  SpO2: 92% (12 Jan 2024 12:30) (91% - 92%)    O2 Parameters below as of 12 Jan 2024 05:31  Patient On (Oxygen Delivery Method): room air            PHYSICAL EXAM:    GENERAL: ill appearing  HEAD:  Atraumatic, Normocephalic  EYES: EOMI, conjunctiva and sclera clear  ENMT: No Drainage from nares, No drainage from ears  NERVOUS SYSTEM:  Awake and Alert  CHEST/LUNG: Clear to percussion bilaterally  EXTREMITIES:  ++Edema  SKIN: No rashes No obvious ecchymosis      LABS:                                   11.9   8.09  )-----------( 228      ( 12 Jan 2024 06:41 )             38.2     01-12    134<L>  |  103  |  63<H>  ----------------------------<  144<H>  3.9   |  28  |  2.70<H>    Ca    8.5      12 Jan 2024 06:41  Phos  4.0     01-12  Mg     2.3     01-12    TPro  7.1  /  Alb  3.1<L>  /  TBili  0.4  /  DBili  x   /  AST  15  /  ALT  13  /  AlkPhos  60  01-11      Urinalysis Basic - ( 12 Jan 2024 06:41 )    Color: x / Appearance: x / SG: x / pH: x  Gluc: 144 mg/dL / Ketone: x  / Bili: x / Urobili: x   Blood: x / Protein: x / Nitrite: x   Leuk Esterase: x / RBC: x / WBC x   Sq Epi: x / Non Sq Epi: x / Bacteria: x

## 2024-01-12 NOTE — DIETITIAN INITIAL EVALUATION ADULT - PROBLEM SELECTOR PLAN 4
Chronic   - Home regimen: lantus 37 units qhs + sliding scale   - will continue lantus 26 units at this time   - LDSS   - accuchecks   - Hypoglycemia protocol

## 2024-01-12 NOTE — DIETITIAN INITIAL EVALUATION ADULT - PROBLEM SELECTOR PLAN 2
Pt presents to the ED for nausea and vomiting, unable to tolerate PO at home   - continue IVF NS 50cc/hr for 12 hours   - tigan prn for nausea   - RVP ordered   - Clear liquid diet

## 2024-01-12 NOTE — PROGRESS NOTE ADULT - PROBLEM SELECTOR PLAN 8
Chronic, baseline Cr appears 2.1-2.3  - Cr improving  - monitor CMP  - Nephro Dr. Edwards recs appreciated

## 2024-01-12 NOTE — PROGRESS NOTE ADULT - SUBJECTIVE AND OBJECTIVE BOX
EVELYN LOPEZ is a 73yFemale , patient examined and chart reviewed.     INTERVAL HPI/ OVERNIGHT EVENTS:   Feeling better. Afebrile.  Doing well.    PAST MEDICAL & SURGICAL HISTORY:  Hypertension  Diabetes  Lymphedema  H/O left bundle branch block  History of left bundle branch block (LBBB)  Systolic heart failure, chronic  H/O cataract  2020  History of surgical removal of meniscus of knee  left in 1971  Frozen shoulder  2000  H/O Achilles tendon repair  lengthened bilaterally, 2000  Fractured skull  1968      For details regarding the patient's social history, family history, and other miscellaneous elements, please refer the initial infectious diseases consultation and/or the admitting history and physical examination for this admission.      ROS:  CONSTITUTIONAL:  Negative fever or chills  EYES:  Negative  blurry vision or double vision  CARDIOVASCULAR:  Negative for chest pain or palpitations  RESPIRATORY:  Negative for cough, wheezing, or SOB   GASTROINTESTINAL:  Negative for nausea, vomiting, diarrhea, constipation, or abdominal pain  GENITOURINARY:  Negative frequency, urgency or dysuria  NEUROLOGIC:  No headache, confusion, dizziness, lightheadedness  All other systems were reviewed and are negative     penicillins (Hives)  Ativan (Rash; Urticaria)      Current inpatient medications :    ANTIBIOTICS/RELEVANT:  cefuroxime   Tablet 250 milliGRAM(s) Oral every 12 hours    MEDICATIONS  (STANDING):  dextrose 5%. 1000 milliLiter(s) (50 mL/Hr) IV Continuous <Continuous>  dextrose 5%. 1000 milliLiter(s) (100 mL/Hr) IV Continuous <Continuous>  dextrose 50% Injectable 25 Gram(s) IV Push once  dextrose 50% Injectable 25 Gram(s) IV Push once  dextrose 50% Injectable 12.5 Gram(s) IV Push once  famotidine  IVPB 20 milliGRAM(s) IV Intermittent daily  glucagon  Injectable 1 milliGRAM(s) IntraMuscular once  heparin   Injectable 5000 Unit(s) SubCutaneous every 8 hours  insulin glargine Injectable (LANTUS) 26 Unit(s) SubCutaneous at bedtime  insulin lispro (ADMELOG) corrective regimen sliding scale   SubCutaneous three times a day before meals  insulin lispro (ADMELOG) corrective regimen sliding scale   SubCutaneous at bedtime  levothyroxine Injectable 56 MICROGram(s) IV Push at bedtime  nystatin Powder 1 Application(s) Topical three times a day  sodium chloride 0.9%. 1000 milliLiter(s) (84 mL/Hr) IV Continuous <Continuous>  triamcinolone 0.1% Cream 1 Application(s) Topical every 12 hours    MEDICATIONS  (PRN):  dextrose Oral Gel 15 Gram(s) Oral once PRN Blood Glucose LESS THAN 70 milliGRAM(s)/deciliter  metoclopramide 10 milliGRAM(s) Oral every 8 hours PRN N/V  trimethobenzamide Injectable 200 milliGRAM(s) IntraMuscular every 8 hours PRN Nausea and/or Vomiting        Objective:  Vital Signs Last 24 Hrs  T(C): 36.8 (12 Jan 2024 05:31), Max: 36.8 (12 Jan 2024 05:31)  T(F): 98.2 (12 Jan 2024 05:31), Max: 98.2 (12 Jan 2024 05:31)  HR: 62 (12 Jan 2024 05:31) (58 - 64)  BP: 119/76 (12 Jan 2024 05:31) (119/76 - 155/70)  RR: 18 (12 Jan 2024 05:31) (18 - 18)  SpO2: 92% (12 Jan 2024 05:31) (91% - 92%)    Parameters below as of 12 Jan 2024 05:31  Patient On (Oxygen Delivery Method): room air      Physical Exam:  General:  no acute distress  Neck: supple, trachea midline  Lungs: clear, no wheeze/rhonchi  Cardiovascular: regular rate and rhythm, S1 S2  Abdomen: soft, nontender,  bowel sounds normal  Neurological: alert and oriented x3  Skin: no rash  Extremities: + edema      LABS:                        11.9   8.09  )-----------( 228      ( 12 Jan 2024 06:41 )             38.2   01-12    134<L>  |  103  |  63<H>  ----------------------------<  144<H>  3.9   |  28  |  2.70<H>    Ca    8.5      12 Jan 2024 06:41  Phos  4.0     01-12  Mg     2.3     01-12    TPro  7.1  /  Alb  3.1<L>  /  TBili  0.4  /  DBili  x   /  AST  15  /  ALT  13  /  AlkPhos  60  01-11      MICROBIOLOGY:  Culture - Blood (collected 09 Jan 2024 01:23)  Source: .Blood Blood  Preliminary Report (11 Jan 2024 07:02):    No growth at 48 Hours    Culture - Blood (collected 09 Jan 2024 01:18)  Source: .Blood Blood  Preliminary Report (11 Jan 2024 07:02):    No growth at 48 Hours    Culture - Urine (collected 08 Jan 2024 04:09)  Source: Clean Catch Clean Catch (Midstream)  Final Report (10 Manuel 2024 13:54):    >100,000 CFU/ml Klebsiella pneumoniae  Organism: Klebsiella pneumoniae (10 Manuel 2024 13:54)  Organism: Klebsiella pneumoniae (10 Manuel 2024 13:54)      -  Levofloxacin: S <=0.5      -  Tobramycin: S <=2      -  Nitrofurantoin: S <=32 Should not be used to treat pyelonephritis      -  Aztreonam: S <=4      -  Gentamicin: S <=2      -  Cefazolin: S <=2 For uncomplicated UTI with K. pneumoniae, E. coli, or P. mirablis: CATRACHITA <=16 is sensitive and CATRACHITA >=32 is resistant. This also predicts results for oral agents cefaclor, cefdinir, cefpodoxime, cefprozil, cefuroxime axetil, cephalexin and locarbef for uncomplicated UTI. Note that some isolates may be susceptible to these agents while testing resistant to cefazolin.      -  Cefepime: S <=2      -  Piperacillin/Tazobactam: S <=8      -  Ciprofloxacin: S <=0.25      -  Imipenem: S <=1      -  Ceftriaxone: S <=1      -  Ampicillin: R <=8 These ampicillin results predict results for amoxicillin      Method Type: CATRACHITA      -  Meropenem: S <=1      -  Ampicillin/Sulbactam: S <=4/2      -  Cefoxitin: S <=8      -  Cefuroxime: S <=4      -  Amoxicillin/Clavulanic Acid: S <=8/4      -  Trimethoprim/Sulfamethoxazole: S <=0.5/9.5      -  Ertapenem: S <=0.5      RADIOLOGY & ADDITIONAL STUDIES:    ACC: [id number] EXAM:  CT ABDOMEN AND PELVIS   ORDERED BY:  TEMO CASTELAN     PROCEDURE DATE:  01/10/2024          INTERPRETATION:  CLINICAL INFORMATION: 73 years  Female with Nausea,   vomiting.    COMPARISON: Noncontrast CT abdomen and pelvis 8/11/2023    CONTRAST/COMPLICATIONS:  IV Contrast: NONE  Oral Contrast: NONE  Complications: None reported at time of study completion    PROCEDURE:  CT of the Abdomen and Pelvis was performed.  Sagittal and coronal reformats were performed.    FINDINGS:  LOWERCHEST: Small bilateral pleural effusions, larger on the right. Mild   cardiomegaly.    LIVER: Within normal limits.  BILE DUCTS: Normal caliber.  GALLBLADDER: Cholelithiasis.  SPLEEN: Within normal limits.  PANCREAS: Within normal limits.  ADRENALS: Within normal limits.  KIDNEYS/URETERS: Mild atrophy. No hydronephrosis. Bilateral nonspecific   perinephric fat stranding.    BLADDER: Within normal limits.  REPRODUCTIVE ORGANS: Unremarkable uterus.    BOWEL: No bowel obstruction. Unremarkable appendix. New right lower   quadrant metallic clip, possibly hemostatic clip in the cecum.  PERITONEUM: No ascites.  VESSELS: Atherosclerotic changes.  RETROPERITONEUM/LYMPH NODES: No lymphadenopathy. Periaortic lymph nodes   measuring up to 5 mm short axis.  ABDOMINAL WALL: Anasarca.  BONES: Bilateral L5 spondylolysis with grade 1 L5-S1 anterolisthesis.   Degenerative changes.    IMPRESSION:  Small bilateral pleural effusions, larger on the right. Mild cardiomegaly.    No acute intra-abdominal pathology.    New right lower quadrant metallic clip, possibly hemostatic clip in the   cecum. Correlate clinically.    Assessment :   72yo F with PMHx IDDM2, HFrEF, HTN, hypothyroidism, LBBB, chronic lymphedema hx of septic shock and Ecoli bacteremia sec Left pyelo with hydro 6/2023 admitted with nausea and vomiting poss sec UTI  ?Viral gastroenteritis  CT AP unremarkable  Has mild rash on neck and trunk - doubt drug rash   Nausea improved  Clinically improving    Plan :   Sp Rocephin--> Ceftin x 4 days   Trend temps and cbc  Asp precautions  Pulm toileting  Stable from ID standpoint    Continue with present regiment.  Appropriate use of antibiotics and adverse effects reviewed.      > 35 minutes were spent in direct patient care reviewing notes, medications ,labs data/ imaging , discussion with multidisciplinary team.    Thank you for allowing me to participate in care of your patient .    Robby Clark MD  Infectious Disease  [phone]      EVELYN LOPEZ is a 73yFemale , patient examined and chart reviewed.     INTERVAL HPI/ OVERNIGHT EVENTS:   Feeling better. Afebrile.  Doing well.    PAST MEDICAL & SURGICAL HISTORY:  Hypertension  Diabetes  Lymphedema  H/O left bundle branch block  History of left bundle branch block (LBBB)  Systolic heart failure, chronic  H/O cataract  2020  History of surgical removal of meniscus of knee  left in 1971  Frozen shoulder  2000  H/O Achilles tendon repair  lengthened bilaterally, 2000  Fractured skull  1968      For details regarding the patient's social history, family history, and other miscellaneous elements, please refer the initial infectious diseases consultation and/or the admitting history and physical examination for this admission.      ROS:  CONSTITUTIONAL:  Negative fever or chills  EYES:  Negative  blurry vision or double vision  CARDIOVASCULAR:  Negative for chest pain or palpitations  RESPIRATORY:  Negative for cough, wheezing, or SOB   GASTROINTESTINAL:  Negative for nausea, vomiting, diarrhea, constipation, or abdominal pain  GENITOURINARY:  Negative frequency, urgency or dysuria  NEUROLOGIC:  No headache, confusion, dizziness, lightheadedness  All other systems were reviewed and are negative     penicillins (Hives)  Ativan (Rash; Urticaria)      Current inpatient medications :    ANTIBIOTICS/RELEVANT:  cefuroxime   Tablet 250 milliGRAM(s) Oral every 12 hours    MEDICATIONS  (STANDING):  dextrose 5%. 1000 milliLiter(s) (50 mL/Hr) IV Continuous <Continuous>  dextrose 5%. 1000 milliLiter(s) (100 mL/Hr) IV Continuous <Continuous>  dextrose 50% Injectable 25 Gram(s) IV Push once  dextrose 50% Injectable 25 Gram(s) IV Push once  dextrose 50% Injectable 12.5 Gram(s) IV Push once  famotidine  IVPB 20 milliGRAM(s) IV Intermittent daily  glucagon  Injectable 1 milliGRAM(s) IntraMuscular once  heparin   Injectable 5000 Unit(s) SubCutaneous every 8 hours  insulin glargine Injectable (LANTUS) 26 Unit(s) SubCutaneous at bedtime  insulin lispro (ADMELOG) corrective regimen sliding scale   SubCutaneous three times a day before meals  insulin lispro (ADMELOG) corrective regimen sliding scale   SubCutaneous at bedtime  levothyroxine Injectable 56 MICROGram(s) IV Push at bedtime  nystatin Powder 1 Application(s) Topical three times a day  sodium chloride 0.9%. 1000 milliLiter(s) (84 mL/Hr) IV Continuous <Continuous>  triamcinolone 0.1% Cream 1 Application(s) Topical every 12 hours    MEDICATIONS  (PRN):  dextrose Oral Gel 15 Gram(s) Oral once PRN Blood Glucose LESS THAN 70 milliGRAM(s)/deciliter  metoclopramide 10 milliGRAM(s) Oral every 8 hours PRN N/V  trimethobenzamide Injectable 200 milliGRAM(s) IntraMuscular every 8 hours PRN Nausea and/or Vomiting        Objective:  Vital Signs Last 24 Hrs  T(C): 36.8 (12 Jan 2024 05:31), Max: 36.8 (12 Jan 2024 05:31)  T(F): 98.2 (12 Jan 2024 05:31), Max: 98.2 (12 Jan 2024 05:31)  HR: 62 (12 Jan 2024 05:31) (58 - 64)  BP: 119/76 (12 Jan 2024 05:31) (119/76 - 155/70)  RR: 18 (12 Jan 2024 05:31) (18 - 18)  SpO2: 92% (12 Jan 2024 05:31) (91% - 92%)    Parameters below as of 12 Jan 2024 05:31  Patient On (Oxygen Delivery Method): room air      Physical Exam:  General:  no acute distress  Neck: supple, trachea midline  Lungs: clear, no wheeze/rhonchi  Cardiovascular: regular rate and rhythm, S1 S2  Abdomen: soft, nontender,  bowel sounds normal  Neurological: alert and oriented x3  Skin: no rash  Extremities: + edema      LABS:                        11.9   8.09  )-----------( 228      ( 12 Jan 2024 06:41 )             38.2   01-12    134<L>  |  103  |  63<H>  ----------------------------<  144<H>  3.9   |  28  |  2.70<H>    Ca    8.5      12 Jan 2024 06:41  Phos  4.0     01-12  Mg     2.3     01-12    TPro  7.1  /  Alb  3.1<L>  /  TBili  0.4  /  DBili  x   /  AST  15  /  ALT  13  /  AlkPhos  60  01-11      MICROBIOLOGY:  Culture - Blood (collected 09 Jan 2024 01:23)  Source: .Blood Blood  Preliminary Report (11 Jan 2024 07:02):    No growth at 48 Hours    Culture - Blood (collected 09 Jan 2024 01:18)  Source: .Blood Blood  Preliminary Report (11 Jan 2024 07:02):    No growth at 48 Hours    Culture - Urine (collected 08 Jan 2024 04:09)  Source: Clean Catch Clean Catch (Midstream)  Final Report (10 Manuel 2024 13:54):    >100,000 CFU/ml Klebsiella pneumoniae  Organism: Klebsiella pneumoniae (10 Manuel 2024 13:54)  Organism: Klebsiella pneumoniae (10 Manuel 2024 13:54)      -  Levofloxacin: S <=0.5      -  Tobramycin: S <=2      -  Nitrofurantoin: S <=32 Should not be used to treat pyelonephritis      -  Aztreonam: S <=4      -  Gentamicin: S <=2      -  Cefazolin: S <=2 For uncomplicated UTI with K. pneumoniae, E. coli, or P. mirablis: CATRACHITA <=16 is sensitive and CATRACHITA >=32 is resistant. This also predicts results for oral agents cefaclor, cefdinir, cefpodoxime, cefprozil, cefuroxime axetil, cephalexin and locarbef for uncomplicated UTI. Note that some isolates may be susceptible to these agents while testing resistant to cefazolin.      -  Cefepime: S <=2      -  Piperacillin/Tazobactam: S <=8      -  Ciprofloxacin: S <=0.25      -  Imipenem: S <=1      -  Ceftriaxone: S <=1      -  Ampicillin: R <=8 These ampicillin results predict results for amoxicillin      Method Type: CATRACHITA      -  Meropenem: S <=1      -  Ampicillin/Sulbactam: S <=4/2      -  Cefoxitin: S <=8      -  Cefuroxime: S <=4      -  Amoxicillin/Clavulanic Acid: S <=8/4      -  Trimethoprim/Sulfamethoxazole: S <=0.5/9.5      -  Ertapenem: S <=0.5      RADIOLOGY & ADDITIONAL STUDIES:    ACC: [id number] EXAM:  CT ABDOMEN AND PELVIS   ORDERED BY:  TEMO CASTELAN     PROCEDURE DATE:  01/10/2024          INTERPRETATION:  CLINICAL INFORMATION: 73 years  Female with Nausea,   vomiting.    COMPARISON: Noncontrast CT abdomen and pelvis 8/11/2023    CONTRAST/COMPLICATIONS:  IV Contrast: NONE  Oral Contrast: NONE  Complications: None reported at time of study completion    PROCEDURE:  CT of the Abdomen and Pelvis was performed.  Sagittal and coronal reformats were performed.    FINDINGS:  LOWERCHEST: Small bilateral pleural effusions, larger on the right. Mild   cardiomegaly.    LIVER: Within normal limits.  BILE DUCTS: Normal caliber.  GALLBLADDER: Cholelithiasis.  SPLEEN: Within normal limits.  PANCREAS: Within normal limits.  ADRENALS: Within normal limits.  KIDNEYS/URETERS: Mild atrophy. No hydronephrosis. Bilateral nonspecific   perinephric fat stranding.    BLADDER: Within normal limits.  REPRODUCTIVE ORGANS: Unremarkable uterus.    BOWEL: No bowel obstruction. Unremarkable appendix. New right lower   quadrant metallic clip, possibly hemostatic clip in the cecum.  PERITONEUM: No ascites.  VESSELS: Atherosclerotic changes.  RETROPERITONEUM/LYMPH NODES: No lymphadenopathy. Periaortic lymph nodes   measuring up to 5 mm short axis.  ABDOMINAL WALL: Anasarca.  BONES: Bilateral L5 spondylolysis with grade 1 L5-S1 anterolisthesis.   Degenerative changes.    IMPRESSION:  Small bilateral pleural effusions, larger on the right. Mild cardiomegaly.    No acute intra-abdominal pathology.    New right lower quadrant metallic clip, possibly hemostatic clip in the   cecum. Correlate clinically.    Assessment :   72yo F with PMHx IDDM2, HFrEF, HTN, hypothyroidism, LBBB, chronic lymphedema hx of septic shock and Ecoli bacteremia sec Left pyelo with hydro 6/2023 admitted with nausea and vomiting poss sec UTI  ?Viral gastroenteritis  CT AP unremarkable  Has mild rash on neck and trunk - doubt drug rash   Nausea improved  Clinically improving    Plan :   Sp Rocephin--> Ceftin x 4 days   Trend temps and cbc  Asp precautions  Pulm toileting  Stable from ID standpoint    Continue with present regiment.  Appropriate use of antibiotics and adverse effects reviewed.      > 35 minutes were spent in direct patient care reviewing notes, medications ,labs data/ imaging , discussion with multidisciplinary team.    Thank you for allowing me to participate in care of your patient .    Robby Clark MD  Infectious Disease  [phone]

## 2024-01-12 NOTE — PROGRESS NOTE ADULT - PROBLEM SELECTOR PLAN 2
Pt presented to the ED for nausea and vomiting, unable to tolerate PO    - continue IVF   - Reglan IV prn for nausea   - Advance diet as tolerated   - RVP, lipase negative  - CT abd/pelvis reviewed no acute intra-abdominal pathology; small bilateral pleural effusions, larger on the right, mild cardiomegaly; new right lower quadrant metallic clip, possibly hemostatic clip in the cecum (patient unsure of this, reports having ovary surgery years ago)

## 2024-01-12 NOTE — DIETITIAN INITIAL EVALUATION ADULT - PROBLEM SELECTOR PLAN 1
Pt presents to the ED for UTI   - UA showed +nitrite, mod leuk est, many bacteria, +SC   - s/p Rocephin x1, 1L NS bolus x1, Zofran x1 in the ED   - lactate 1.6  - continue rocephin x3 days   - f/u urine culture   - f/u BCx x2  - Monitor for leukocytosis

## 2024-01-13 NOTE — PROGRESS NOTE ADULT - NS ATTEND AMEND GEN_ALL_CORE FT
72yo F with PMHx T2DM, HFrEF, HTN, hypothyroidism, LBBB, chronic lymphedema, CKD presents to the ED for nausea and vomiting, admitted for UTI and decreased PO intake. Cardiology consulted for HFpEF history     known LBBB, 1st degree AVB, QTc prolongation  - EKG showing sinus rhythm rate 62 bpm w/ 1st degree AV block and non-specific intraventricular block. QTc prolonged at 529ms  - Pt w/ known LBBB, AV block noted in outpatient EKG 11/2023  - Relatively unchanged from previous EKG  - Use caution with AV lulu blockers  - Avoid any QT prolonging medications    - /73 continue home toprol     - TTE 6/2023 LVEF 38%  -has chronic LE edema, on room air no orthopnea   -seen by renal DUNCAN On CKD with poor po intake  holding diuretics reassess daily   -fu am labs pending   - Monitor and replete lytes, keep K>4, Mg>2.  - Strict I/Os, daily weights.     cardiac cath in 2022 showed non-obstructing CAD and no intervention indicated at the time  - c/w home ASA and statin  - Monitor for any anginal symptoms or clinical signs of ischemia    - Other cardiovascular workup will depend on clinical course.  - All other workup per primary team.  - Will continue to follow.

## 2024-01-13 NOTE — DISCHARGE NOTE PROVIDER - NSDCCPCAREPLAN_GEN_ALL_CORE_FT
PRINCIPAL DISCHARGE DIAGNOSIS  Diagnosis: Acute UTI  Assessment and Plan of Treatment: Continue Ceftin 250 mg every 12 hours until 1/15. Follow up with your PCP within 1 week of discharge.      SECONDARY DISCHARGE DIAGNOSES  Diagnosis: Chronic kidney disease, unspecified CKD stage  Assessment and Plan of Treatment: You have a known history of chronic kidney disease (CKD). CKD is the gradual and/or permanent loss of kidney function. Please continue with a renal diet and follow up with your PCP and/or nephrologist. STOP taking Eplenerone, Torsemide and Furosemide. Follow up with your nephrologist within 1 week for repeat labs and to discuss restarting medications.

## 2024-01-13 NOTE — DISCHARGE NOTE PROVIDER - NSDCMRMEDTOKEN_GEN_ALL_CORE_FT
aspirin 81 mg oral tablet, chewable: 1 tab(s) orally once a day  atorvastatin 80 mg oral tablet: 1 tab(s) orally once a day (at bedtime)  cefuroxime 250 mg oral tablet: 1 tab(s) orally every 12 hours  ferrous sulfate 324 mg (65 mg elemental iron) oral tablet: 1 tab(s) orally 2 times a day  Lantus 100 units/mL subcutaneous solution: 37 unit(s) subcutaneous once a day (at bedtime)  levothyroxine 75 mcg (0.075 mg) oral tablet: 1 tab(s) orally once a day  metoprolol succinate 100 mg oral tablet, extended release: 1 tab(s) orally once a day  NovoLOG 100 units/mL subcutaneous solution: subcutaneous Sliding scale MDD 40units  pantoprazole 40 mg oral delayed release tablet: 1 tab(s) orally once a day (before a meal)

## 2024-01-13 NOTE — DISCHARGE NOTE PROVIDER - CARE PROVIDER_API CALL
Julius Jimenez  Internal Medicine  [address] [place] [address], [address]  [place] [address]  Phone: [phone]  Fax: [phone]  Follow Up Time:    Julius Jimenez  Internal Medicine  [address] [place] [address], [address]  [place] [address]  Phone: [phone]  Fax: [phone]  Follow Up Time:

## 2024-01-13 NOTE — DISCHARGE NOTE PROVIDER - DETAILS OF MALNUTRITION DIAGNOSIS/DIAGNOSES
This patient has been assessed with a concern for Malnutrition and was treated during this hospitalization for the following Nutrition diagnosis/diagnoses:     -  01/12/2024: Morbid obesity (BMI > 40)

## 2024-01-13 NOTE — CASE MANAGEMENT PROGRESS NOTE - NSCMPROGRESSNOTE_GEN_ALL_CORE
Per MD, patient is medically cleared for discharge home today.  CM met with patient to discussed discharge disposition home with no formal services. Discharge notice signed and copy given to patient.  Sister will pick her up. Patient verbalized understanding of the transition plan and is in agreement.  CM remains available throughout hospital stay.

## 2024-01-13 NOTE — DISCHARGE NOTE PROVIDER - HOSPITAL COURSE
ADMISSION H+P:    HPI:  74yo F with PMHx T2DM, HFrEF, HTN, hypothyroidism, LBBB, chronic lymphedema, CKD presents to the ED for nausea and vomiting. Pt came to the ED yesterday for nausea and non-bloody emesis, was found to have a UTI, and sent home on Cipro. Pt unable to tolerate PO at home so she came back to the ED. Pt states that partner has been sick at home. No recent travel.  Pt recently admitted to [facility] fo e.coli bacteremia from Aug 11-15.   Of note, Pt additionally admitted in June with septic shock and E. coli bacteremia 2/2 left pyelo with hydro- no obtructive uropathy on CT. She was on pressors and given 10 days of iv rocephin.     ED course  Vitals: T 98.2, HR 66, /67, RR 18, SpO2 94% on RA  Significant labs: Cr 2.3, lactate 1.6  UA showed +nitrite, mod leuk est, many bacteria, +SC   Imaging: N/A  In ED patient given: Rocephin x1, 1L NS bolus x1, Zofran x1    (09 Jan 2024 07:09)      ---  HOSPITAL COURSE: Patient admitted with acute on CKD 4. Nephro (Dr. Wilde) consulted. Patient has chronic LE edema, but diuretic held due to DUNCAN. Patient advised to follow up outpatient for repeat blood work and to discuss restarting diuretics. Patient was treated with Ceftin for UTI.     Patient was medically optimized and improved clinically throughout hospital course. Patient seen and examined on day of discharge.    Vital Signs  T(C): 36.8 (13 Jan 2024 04:28), Max: 36.9 (12 Jan 2024 20:44)  T(F): 98.3 (13 Jan 2024 04:28), Max: 98.5 (12 Jan 2024 20:44)  HR: 78 (13 Jan 2024 04:28) (61 - 78)  BP: 122/72 (13 Jan 2024 04:28) (122/72 - 147/76)  RR: 17 (13 Jan 2024 04:28) (17 - 18)  SpO2: 91% (13 Jan 2024 04:28) (91% - 92%)    Physical Exam:  General: well-developed, well-nourished, NAD  HEENT: normocephalic, atraumatic, EOMI, moist mucous membranes   Neck: supple, non-tender, no masses  Neurology: AAOx3, sensation intact  Respiratory: clear to auscultation bilaterally; no wheezes, rhonchi, or rales  CV: regular rate and rhythm, soft S1/S2, no murmurs, rubs, or gallops  Abdominal: soft, non-tender, non-distended, bowel sounds present  Extremities: no clubbing, cyanosis, or edema; palpable peripheral pulses  Musculoskeletal: no joint erythema or warmth, no joint swelling   Skin: warm, dry, normal color    Patient is medically stable for discharge to home with outpatient follow up.  ---  CONSULTANTS:   Nephro: Dr. Wilde    ---  TIME SPENT:   I, the attending physician, was physically present for the key portions of the evaluation and management (E/M) service provided. The total amount of time spent reviewing the hospital course, laboratory values, imaging findings, assessing/counseling the patient, discussing with consultant physicians, social work, nursing staff was 35 minutes.     ---  FINAL DISCHARGE DIAGNOSIS LIST:  Please see last daily progress note for final discharge diagnoses           ADMISSION H+P:    HPI:  72yo F with PMHx T2DM, HFrEF, HTN, hypothyroidism, LBBB, chronic lymphedema, CKD presents to the ED for nausea and vomiting. Pt came to the ED yesterday for nausea and non-bloody emesis, was found to have a UTI, and sent home on Cipro. Pt unable to tolerate PO at home so she came back to the ED. Pt states that partner has been sick at home. No recent travel.  Pt recently admitted to \[facility]\"" fo e.coli bacteremia from Aug 11-15.   Of note, Pt additionally admitted in June with septic shock and E. coli bacteremia 2/2 left pyelo with hydro- no obtructive uropathy on CT. She was on pressors and given 10 days of iv rocephin.     ED course  Vitals: T 98.2, HR 66, /67, RR 18, SpO2 94% on RA  Significant labs: Cr 2.3, lactate 1.6  UA showed +nitrite, mod leuk est, many bacteria, +SC   Imaging: N/A  In ED patient given: Rocephin x1, 1L NS bolus x1, Zofran x1    (09 Jan 2024 07:09)      ---  HOSPITAL COURSE: Patient admitted with acute on CKD 4. Nephro (Dr. Wilde) consulted. Patient has chronic LE edema, but diuretic held due to D[place]AN. Patient advised to follow up outpatient for repeat blood work and to discuss restarting diuretics. Patient was treated with Ceftin for UTI.     Patient was medically optimized and improved clinically throughout hospital course. Patient seen and examined on day of discharge.    Vital Signs  T(C): 36.8 (13 Jan 2024 04:28), Max: 36.9 (12 Jan 2024 20:44)  T(F): 98.3 (13 Jan 2024 04:28), Max: 98.5 (12 Jan 2024 20:44)  HR: 78 (13 Jan 2024 04:28) (61 - 78)  BP: 122/72 (13 Jan 2024 04:28) (122/72 - 147/76)  RR: 17 (13 Jan 2024 04:28) (17 - 18)  SpO2: 91% (13 Jan 2024 04:28) (91% - 92%)    Physical Exam:  General: well-developed, well-nourished, NAD  HEENT: normocephalic, atraumatic, EOMI, moist mucous membranes   Neck: supple, non-tender, no masses  Neurology: AAOx3, sensation intact  Respiratory: clear to auscultation bilaterally; no wheezes, rhonchi, or rales  CV: regular rate and rhythm, soft S1/S2, no murmurs, rubs, or gallops  Abdominal: soft, non-tender, non-distended, bowel sounds present  Extremities: no clubbing, cyanosis, or edema; palpable peripheral pulses  Musculoskeletal: no joint erythema or warmth, no joint swelling   Skin: warm, dry, normal color    Patient is medically stable for discharge to home with outpatient follow up.  ---  CONSULTANTS:   Nephro: Dr. Wilde    ---  TIME SPENT:   I, the attending physician, was physically present for the key portions of the evaluation and management (E/M) service provided. The total amount of time spent reviewing the hospital course, laboratory values, imaging findings, assessing/counseling the patient, discussing with consultant physicians, social work, nursing staff was 35 minutes.     ---  FINAL DISCHARGE DIAGNOSIS LIST:  Please see last daily progress note for final discharge diagnoses

## 2024-01-13 NOTE — PROGRESS NOTE ADULT - SUBJECTIVE AND OBJECTIVE BOX
[facility] -- Howard Kimble Pannella, Patel, Savella Goodger, Cohen  Office # [phone]      Follow Up:  N/V , hx heart failure, htn lbbb     Subjective/Observations:       REVIEW OF SYSTEMS: All other review of systems is negative unless indicated above    PAST MEDICAL & SURGICAL HISTORY:  Hypertension      Diabetes      Lymphedema      H/O left bundle branch block      History of left bundle branch block (LBBB)      Systolic heart failure, chronic      H/O cataract        History of surgical removal of meniscus of knee  left in       Frozen shoulder        H/O Achilles tendon repair  lengthened bilaterally,       Fractured skull            MEDICATIONS  (STANDING):  aspirin  chewable 81 milliGRAM(s) Oral daily  atorvastatin 80 milliGRAM(s) Oral at bedtime  cefuroxime   Tablet 250 milliGRAM(s) Oral every 12 hours  dextrose 5%. 1000 milliLiter(s) (100 mL/Hr) IV Continuous <Continuous>  dextrose 5%. 1000 milliLiter(s) (50 mL/Hr) IV Continuous <Continuous>  dextrose 50% Injectable 25 Gram(s) IV Push once  dextrose 50% Injectable 25 Gram(s) IV Push once  dextrose 50% Injectable 12.5 Gram(s) IV Push once  glucagon  Injectable 1 milliGRAM(s) IntraMuscular once  heparin   Injectable 5000 Unit(s) SubCutaneous every 8 hours  insulin glargine Injectable (LANTUS) 26 Unit(s) SubCutaneous at bedtime  insulin lispro (ADMELOG) corrective regimen sliding scale   SubCutaneous at bedtime  insulin lispro (ADMELOG) corrective regimen sliding scale   SubCutaneous three times a day before meals  levothyroxine 75 MICROGram(s) Oral daily  metoprolol succinate  milliGRAM(s) Oral daily  nystatin Powder 1 Application(s) Topical three times a day  pantoprazole    Tablet 40 milliGRAM(s) Oral before breakfast  sodium chloride 0.9%. 1000 milliLiter(s) (84 mL/Hr) IV Continuous <Continuous>  triamcinolone 0.1% Cream 1 Application(s) Topical every 12 hours    MEDICATIONS  (PRN):  dextrose Oral Gel 15 Gram(s) Oral once PRN Blood Glucose LESS THAN 70 milliGRAM(s)/deciliter  metoclopramide 10 milliGRAM(s) Oral every 8 hours PRN N/V  trimethobenzamide Injectable 200 milliGRAM(s) IntraMuscular every 8 hours PRN Nausea and/or Vomiting      Allergies    penicillins (Hives)  Ativan (Rash; Urticaria)    Intolerances        Vital Signs Last 24 Hrs  T(C): 36.8 (2024 04:28), Max: 36.9 (2024 20:44)  T(F): 98.3 (2024 04:28), Max: 98.5 (2024 20:44)  HR: 78 (:28) (61 - 78)  BP: 122/72 (2024 04:28) (122/72 - 147/76)  BP(mean): --  RR: 17 (2024 04:28) (17 - 18)  SpO2: 91% (:28) (91% - 92%)    Parameters below as of 2024 04:28  Patient On (Oxygen Delivery Method): room air        I&O's Summary    2024 07:01  -  2024 07:00  --------------------------------------------------------  IN: 0 mL / OUT: 1850 mL / NET: -1850 mL          PHYSICAL EXAM:  TELE:   Constitutional: NAD, awake and alert, well-developed  HEENT: Moist Mucous Membranes, Anicteric  Pulmonary: Non-labored, breath sounds are clear bilaterally, No wheezing, crackles or rhonchi  Cardiovascular: Regular, S1 and S2 nl, No murmurs, rubs, gallops or clicks  Gastrointestinal: Bowel Sounds present, soft, nontender.   Lymph: chronic peripheral edema.  Skin: No visible rashes or ulcers.  Psych:  Mood & affect appropriate    LABS: All Labs Reviewed:                        11.9   8.09  )-----------( 228      ( 2024 06:41 )             38.2                         11.8   9.53  )-----------( 206      ( 2024 07:55 )             37.9     2024 06:41    134    |  103    |  63     ----------------------------<  144    3.9     |  28     |  2.70   2024 07:55    133    |  103    |  64     ----------------------------<  177    4.1     |  25     |  3.00     Ca    8.5        2024 06:41  Ca    8.9        2024 07:55  Phos  4.0       2024 06:41  Mg     2.3       2024 06:41    TPro  7.1    /  Alb  3.1    /  TBili  0.4    /  DBili  x      /  AST  15     /  ALT  13     /  AlkPhos  60     2024 07:55             EC Lead ECG:   Ventricular Rate 62 BPM    Atrial Rate 62 BPM    P-R Interval 280 ms    QRS Duration 168 ms    Q-T Interval 522 ms    QTC Calculation(Bazett) 529 ms    P Axis 39 degrees    R Axis 260 degrees    T Axis 89 degrees    Diagnosis Line Sinus rhythm withsinus arrhythmia with 1st degree AV block  Right superior axis deviation  Nonspecific intraventricular block  Abnormal ECG  When compared with ECG of 11-AUG-2023 09:52,  premature atrial complexes are no longer present  T wave inversion no longer evident in Lateral leads  Confirmed by IMAN MONROE ([id number]) on 2024 6:10:30 PM (24 @ 09:46)      TRANSTHORACIC ECHOCARDIOGRAM REPORT  ________________________________________________________________________________                                      _______       Pt. Name:       EVELYN LOPEZ Study Date:    2023  MRN:            [id number]         YOB: 1950  Accession #:    [id number]        Age:           72 years  Account#:       [id number]       Gender:        F  Heart Rate:                      Height:        70.87 in (180.00 cm)  Rhythm:                          Weight:        ( )  Blood Pressure: 119/57 mmHg      BSA/BMI:       /  ________________________________________________________________________________________  Referring Physician:    [id number] Luis Mcdermott  Interpreting Physician: Skylar Hernández MD  Primary Sonographer:    AS    CPT:               ECHO TTE WO CON COMP W DOPP - 23462.m  Indication(s):     Cardiac murmur, unspecified - R01.1  Procedure:         Transthoracic echocardiogram with 2-D, M-mode and complete                     spectral and color flow Doppler.  Ordering Location: ICU1    _______________________________________________________________________________________  CONCLUSIONS:      1. The left ventricular systolic function is mildly decreased with an ejection fraction of 38 % by Nunes's method of disks.   2. Normal right ventricular cavity size and probably normal systolic function.   3. The left atrium is mildly dilated.   4. The right atrium is normal.   5. Moderate calcification of the aortic valve leaflets.  6. Moderate aortic stenosis.   7. There is moderate calcification of the mitral valve annulus.   8. Trace mitral regurgitation.   9. Trace tricuspid regurgitation.  10. Pulmonic valve was not well visualized.  11. The inferior vena cava is normal measuring 1.86 cm in diameter, (normal <2.1cm) with normal inspiratory collapse (normal >50%) consistent with normal right atrial pressure (~3, range 0-5mmHg).    ________________________________________________________________________________________  FINDINGS:     Left Ventricle:  The left ventricular systolic function is mildly decreased with a calculated ejection fraction of 38 % by the Nunes's biplane method of disks.     Right Ventricle:  Normal right ventricular cavity size and probably normal systolic function. Tricuspid annular plane systolic excursion (TAPSE) is 2.7 cm (normal >=1.7 cm).     Left Atrium:  The left atrium is mildly dilated.     Right Atrium:  The right atrium is normal.     Aortic Valve:  There is moderate calcification of the aortic valve leaflets. There is moderate aortic stenosis. The peak transaortic velocity is 3.80 m/s, peak transaortic gradient is 57.8 mmHg and mean transaortic gradient is 31.0 mmHg with an LVOT/aortic valve VTI ratio of 0.23. The aortic valve area is estimated at 0.64 cm² by the continuity equation.     Mitral Valve:  There is mitral valve thickening of the anterior and posterior leaflets. There is moderate calcification of the mitral valve annulus. There is trace mitral regurgitation.     Tricuspid Valve:  There is trace tricuspid regurgitation.     Pulmonic Valve:  The pulmonic valve was not well visualized.     Systemic Veins:  The inferior vena cava is normal measuring 1.86 cm in diameter, (normal <2.1cm) with normal inspiratorycollapse (normal >50%) consistent with normal right atrial pressure (~3, range 0-5mmHg).  ____________________________________________________________________  QUANTITATIVE DATA  Left Ventricle Measurements                         Indexed BSA  IVSd (2D):   1.1 cm  LVPWd (2D):  1.3 cm  LVIDd (2D):  5.2 cm  LVIDs (2D):  4.3 cm  LV Mass:     247 g  LV Vol d, MOD A2C: 111.0 ml  LV Vol d, MOD A4C: 112.0 ml  LV Vol d, MOD BP:  116.7 ml  LV Vol s, MOD A2C: 70.9 ml  LV Vol s, MOD A4C: 67.8 ml  LV Vol s, MOD BP:  72.9 ml  LVOT SV MOD BP:    43.8 ml  LV EF% MOD BP:     38 %     MV E Vmax:    0.89 m/s  MV A Vmax:    1.22 m/s  MV E/A:       0.73  e' lateral:   12.60 cm/s  e' medial:    11.60 cm/s  E/e' lateral: 7.10  E/e' medial:  7.71  E/e' Average: 7.39  MV DT:        207 msec       Left Atrium Measurements     LA Diam 2D: 4.20 cm    Right Ventricle Measurements     TAPSE:            2.7 cm  TV Preeti. S':       15.50 cm/s  RV Base (RVID1):  3.5 cm  RV Mid (RVID2):   3.1 cm  RV Major (RVID3): 8.3 cm       LVOT / RVOT/ Qp/Qs Data:  LVOT Diameter: 1.90 cm  LVOT Vmax:     0.83 m/s  LVOT VTI:      17.50 cm  LVOT SV:       49.6 ml    Aortic Valve Measurements     AV Vmax:          380.0 cm/s  AV Peak Gradient: 57.8 mmHg  AV Mean Gradient: 31.0 mmHg  AVVTI:           77.6 cm  AV VTI Ratio:     0.23  AoV EOA, Contin:  0.64 cm²    Mitral Valve Measurements     MV E Vmax: 0.9 m/s  MV A Vmax: 1.2 m/s  MV E/A:    0.7       Tricuspid Valve Measurements     RA Pressure: 3 mmHg    ________________________________________________________________________________________  Diagnosing Physician: Skylar Hernández MD.  Electronically signed on 2023 at 6:14:52 PM by Skylar Hernández MD         *** Final ***      Radiology:         [facility] -- Howard Kimble Pannella, Patel, Savella Goodger, Cohen  Office # [phone]      Follow Up:  N/V , hx heart failure, htn lbbb     Subjective/Observations:       REVIEW OF SYSTEMS: All other review of systems is negative unless indicated above    PAST MEDICAL & SURGICAL HISTORY:  Hypertension      Diabetes      Lymphedema      H/O left bundle branch block      History of left bundle branch block (LBBB)      Systolic heart failure, chronic      H/O cataract        History of surgical removal of meniscus of knee  left in       Frozen shoulder        H/O Achilles tendon repair  lengthened bilaterally,       Fractured skull            MEDICATIONS  (STANDING):  aspirin  chewable 81 milliGRAM(s) Oral daily  atorvastatin 80 milliGRAM(s) Oral at bedtime  cefuroxime   Tablet 250 milliGRAM(s) Oral every 12 hours  dextrose 5%. 1000 milliLiter(s) (100 mL/Hr) IV Continuous <Continuous>  dextrose 5%. 1000 milliLiter(s) (50 mL/Hr) IV Continuous <Continuous>  dextrose 50% Injectable 25 Gram(s) IV Push once  dextrose 50% Injectable 25 Gram(s) IV Push once  dextrose 50% Injectable 12.5 Gram(s) IV Push once  glucagon  Injectable 1 milliGRAM(s) IntraMuscular once  heparin   Injectable 5000 Unit(s) SubCutaneous every 8 hours  insulin glargine Injectable (LANTUS) 26 Unit(s) SubCutaneous at bedtime  insulin lispro (ADMELOG) corrective regimen sliding scale   SubCutaneous at bedtime  insulin lispro (ADMELOG) corrective regimen sliding scale   SubCutaneous three times a day before meals  levothyroxine 75 MICROGram(s) Oral daily  metoprolol succinate  milliGRAM(s) Oral daily  nystatin Powder 1 Application(s) Topical three times a day  pantoprazole    Tablet 40 milliGRAM(s) Oral before breakfast  sodium chloride 0.9%. 1000 milliLiter(s) (84 mL/Hr) IV Continuous <Continuous>  triamcinolone 0.1% Cream 1 Application(s) Topical every 12 hours    MEDICATIONS  (PRN):  dextrose Oral Gel 15 Gram(s) Oral once PRN Blood Glucose LESS THAN 70 milliGRAM(s)/deciliter  metoclopramide 10 milliGRAM(s) Oral every 8 hours PRN N/V  trimethobenzamide Injectable 200 milliGRAM(s) IntraMuscular every 8 hours PRN Nausea and/or Vomiting      Allergies    penicillins (Hives)  Ativan (Rash; Urticaria)    Intolerances        Vital Signs Last 24 Hrs  T(C): 36.8 (2024 04:28), Max: 36.9 (2024 20:44)  T(F): 98.3 (2024 04:28), Max: 98.5 (2024 20:44)  HR: 78 (:28) (61 - 78)  BP: 122/72 (2024 04:28) (122/72 - 147/76)  BP(mean): --  RR: 17 (2024 04:28) (17 - 18)  SpO2: 91% (:28) (91% - 92%)    Parameters below as of 2024 04:28  Patient On (Oxygen Delivery Method): room air        I&O's Summary    2024 07:01  -  2024 07:00  --------------------------------------------------------  IN: 0 mL / OUT: 1850 mL / NET: -1850 mL          PHYSICAL EXAM:  TELE:   Constitutional: NAD, awake and alert, well-developed  HEENT: Moist Mucous Membranes, Anicteric  Pulmonary: Non-labored, breath sounds are clear bilaterally, No wheezing, crackles or rhonchi  Cardiovascular: Regular, S1 and S2 nl, No murmurs, rubs, gallops or clicks  Gastrointestinal: Bowel Sounds present, soft, nontender.   Lymph: chronic peripheral edema.  Skin: No visible rashes or ulcers.  Psych:  Mood & affect appropriate    LABS: All Labs Reviewed:                        11.9   8.09  )-----------( 228      ( 2024 06:41 )             38.2                         11.8   9.53  )-----------( 206      ( 2024 07:55 )             37.9     2024 06:41    134    |  103    |  63     ----------------------------<  144    3.9     |  28     |  2.70   2024 07:55    133    |  103    |  64     ----------------------------<  177    4.1     |  25     |  3.00     Ca    8.5        2024 06:41  Ca    8.9        2024 07:55  Phos  4.0       2024 06:41  Mg     2.3       2024 06:41    TPro  7.1    /  Alb  3.1    /  TBili  0.4    /  DBili  x      /  AST  15     /  ALT  13     /  AlkPhos  60     2024 07:55             EC Lead ECG:   Ventricular Rate 62 BPM    Atrial Rate 62 BPM    P-R Interval 280 ms    QRS Duration 168 ms    Q-T Interval 522 ms    QTC Calculation(Bazett) 529 ms    P Axis 39 degrees    R Axis 260 degrees    T Axis 89 degrees    Diagnosis Line Sinus rhythm withsinus arrhythmia with 1st degree AV block  Right superior axis deviation  Nonspecific intraventricular block  Abnormal ECG  When compared with ECG of 11-AUG-2023 09:52,  premature atrial complexes are no longer present  T wave inversion no longer evident in Lateral leads  Confirmed by IMAN MONROE ([id number]) on 2024 6:10:30 PM (24 @ 09:46)      TRANSTHORACIC ECHOCARDIOGRAM REPORT  ________________________________________________________________________________                                      _______       Pt. Name:       EVELYN LOPEZ Study Date:    2023  MRN:            [id number]         YOB: 1950  Accession #:    [id number]        Age:           72 years  Account#:       [id number]       Gender:        F  Heart Rate:                      Height:        70.87 in (180.00 cm)  Rhythm:                          Weight:        ( )  Blood Pressure: 119/57 mmHg      BSA/BMI:       /  ________________________________________________________________________________________  Referring Physician:    [id number] Luis Mcdermott  Interpreting Physician: Skylar Hernández MD  Primary Sonographer:    AS    CPT:               ECHO TTE WO CON COMP W DOPP - 68044.m  Indication(s):     Cardiac murmur, unspecified - R01.1  Procedure:         Transthoracic echocardiogram with 2-D, M-mode and complete                     spectral and color flow Doppler.  Ordering Location: ICU1    _______________________________________________________________________________________  CONCLUSIONS:      1. The left ventricular systolic function is mildly decreased with an ejection fraction of 38 % by Nunes's method of disks.   2. Normal right ventricular cavity size and probably normal systolic function.   3. The left atrium is mildly dilated.   4. The right atrium is normal.   5. Moderate calcification of the aortic valve leaflets.  6. Moderate aortic stenosis.   7. There is moderate calcification of the mitral valve annulus.   8. Trace mitral regurgitation.   9. Trace tricuspid regurgitation.  10. Pulmonic valve was not well visualized.  11. The inferior vena cava is normal measuring 1.86 cm in diameter, (normal <2.1cm) with normal inspiratory collapse (normal >50%) consistent with normal right atrial pressure (~3, range 0-5mmHg).    ________________________________________________________________________________________  FINDINGS:     Left Ventricle:  The left ventricular systolic function is mildly decreased with a calculated ejection fraction of 38 % by the Nunes's biplane method of disks.     Right Ventricle:  Normal right ventricular cavity size and probably normal systolic function. Tricuspid annular plane systolic excursion (TAPSE) is 2.7 cm (normal >=1.7 cm).     Left Atrium:  The left atrium is mildly dilated.     Right Atrium:  The right atrium is normal.     Aortic Valve:  There is moderate calcification of the aortic valve leaflets. There is moderate aortic stenosis. The peak transaortic velocity is 3.80 m/s, peak transaortic gradient is 57.8 mmHg and mean transaortic gradient is 31.0 mmHg with an LVOT/aortic valve VTI ratio of 0.23. The aortic valve area is estimated at 0.64 cm² by the continuity equation.     Mitral Valve:  There is mitral valve thickening of the anterior and posterior leaflets. There is moderate calcification of the mitral valve annulus. There is trace mitral regurgitation.     Tricuspid Valve:  There is trace tricuspid regurgitation.     Pulmonic Valve:  The pulmonic valve was not well visualized.     Systemic Veins:  The inferior vena cava is normal measuring 1.86 cm in diameter, (normal <2.1cm) with normal inspiratorycollapse (normal >50%) consistent with normal right atrial pressure (~3, range 0-5mmHg).  ____________________________________________________________________  QUANTITATIVE DATA  Left Ventricle Measurements                         Indexed BSA  IVSd (2D):   1.1 cm  LVPWd (2D):  1.3 cm  LVIDd (2D):  5.2 cm  LVIDs (2D):  4.3 cm  LV Mass:     247 g  LV Vol d, MOD A2C: 111.0 ml  LV Vol d, MOD A4C: 112.0 ml  LV Vol d, MOD BP:  116.7 ml  LV Vol s, MOD A2C: 70.9 ml  LV Vol s, MOD A4C: 67.8 ml  LV Vol s, MOD BP:  72.9 ml  LVOT SV MOD BP:    43.8 ml  LV EF% MOD BP:     38 %     MV E Vmax:    0.89 m/s  MV A Vmax:    1.22 m/s  MV E/A:       0.73  e' lateral:   12.60 cm/s  e' medial:    11.60 cm/s  E/e' lateral: 7.10  E/e' medial:  7.71  E/e' Average: 7.39  MV DT:        207 msec       Left Atrium Measurements     LA Diam 2D: 4.20 cm    Right Ventricle Measurements     TAPSE:            2.7 cm  TV Preeti. S':       15.50 cm/s  RV Base (RVID1):  3.5 cm  RV Mid (RVID2):   3.1 cm  RV Major (RVID3): 8.3 cm       LVOT / RVOT/ Qp/Qs Data:  LVOT Diameter: 1.90 cm  LVOT Vmax:     0.83 m/s  LVOT VTI:      17.50 cm  LVOT SV:       49.6 ml    Aortic Valve Measurements     AV Vmax:          380.0 cm/s  AV Peak Gradient: 57.8 mmHg  AV Mean Gradient: 31.0 mmHg  AVVTI:           77.6 cm  AV VTI Ratio:     0.23  AoV EOA, Contin:  0.64 cm²    Mitral Valve Measurements     MV E Vmax: 0.9 m/s  MV A Vmax: 1.2 m/s  MV E/A:    0.7       Tricuspid Valve Measurements     RA Pressure: 3 mmHg    ________________________________________________________________________________________  Diagnosing Physician: Skylar Hernández MD.  Electronically signed on 2023 at 6:14:52 PM by Skylar Hernández MD         *** Final ***      Radiology:         [facility] -- Howard Kimble Pannella, Patel, Savella Goodger, Cohen  Office # [phone]      Follow Up:  N/V , hx heart failure, htn lbbb     Subjective/Observations:   No events overnight resting comfortably in bed.  No complaints of chest pain, dyspnea, or palpitations reported. No signs of orthopnea or PND.  on room air   denies any further n/v/d    REVIEW OF SYSTEMS: All other review of systems is negative unless indicated above    PAST MEDICAL & SURGICAL HISTORY:  Hypertension      Diabetes      Lymphedema      H/O left bundle branch block      History of left bundle branch block (LBBB)      Systolic heart failure, chronic      H/O cataract        History of surgical removal of meniscus of knee  left in       Frozen shoulder        H/O Achilles tendon repair  lengthened bilaterally,       Fractured skull            MEDICATIONS  (STANDING):  aspirin  chewable 81 milliGRAM(s) Oral daily  atorvastatin 80 milliGRAM(s) Oral at bedtime  cefuroxime   Tablet 250 milliGRAM(s) Oral every 12 hours  dextrose 5%. 1000 milliLiter(s) (100 mL/Hr) IV Continuous <Continuous>  dextrose 5%. 1000 milliLiter(s) (50 mL/Hr) IV Continuous <Continuous>  dextrose 50% Injectable 25 Gram(s) IV Push once  dextrose 50% Injectable 25 Gram(s) IV Push once  dextrose 50% Injectable 12.5 Gram(s) IV Push once  glucagon  Injectable 1 milliGRAM(s) IntraMuscular once  heparin   Injectable 5000 Unit(s) SubCutaneous every 8 hours  insulin glargine Injectable (LANTUS) 26 Unit(s) SubCutaneous at bedtime  insulin lispro (ADMELOG) corrective regimen sliding scale   SubCutaneous at bedtime  insulin lispro (ADMELOG) corrective regimen sliding scale   SubCutaneous three times a day before meals  levothyroxine 75 MICROGram(s) Oral daily  metoprolol succinate  milliGRAM(s) Oral daily  nystatin Powder 1 Application(s) Topical three times a day  pantoprazole    Tablet 40 milliGRAM(s) Oral before breakfast  sodium chloride 0.9%. 1000 milliLiter(s) (84 mL/Hr) IV Continuous <Continuous>  triamcinolone 0.1% Cream 1 Application(s) Topical every 12 hours    MEDICATIONS  (PRN):  dextrose Oral Gel 15 Gram(s) Oral once PRN Blood Glucose LESS THAN 70 milliGRAM(s)/deciliter  metoclopramide 10 milliGRAM(s) Oral every 8 hours PRN N/V  trimethobenzamide Injectable 200 milliGRAM(s) IntraMuscular every 8 hours PRN Nausea and/or Vomiting      Allergies    penicillins (Hives)  Ativan (Rash; Urticaria)    Intolerances        Vital Signs Last 24 Hrs  T(C): 36.8 (2024 04:28), Max: 36.9 (2024 20:44)  T(F): 98.3 (2024 04:28), Max: 98.5 (2024 20:44)  HR: 78 (2024 04:28) (61 - 78)  BP: 122/72 (2024 04:28) (122/72 - 147/76)  BP(mean): --  RR: 17 (2024 04:28) (17 - 18)  SpO2: 91% (2024 04:28) (91% - 92%)    Parameters below as of 2024 04:28  Patient On (Oxygen Delivery Method): room air        I&O's Summary    2024 07:01  -  2024 07:00  --------------------------------------------------------  IN: 0 mL / OUT: 1850 mL / NET: -1850 mL          PHYSICAL EXAM:  TELE: not on tele   Constitutional: NAD, awake and alert, obese  HEENT: Moist Mucous Membranes, Anicteric  Pulmonary: Non-labored, breath sounds are clear bilaterally, No wheezing, crackles or rhonchi  Cardiovascular: Regular, S1 and S2 nl, No murmurs, rubs, gallops or clicks  Gastrointestinal: Bowel Sounds present, soft, nontender.   Lymph: chronic lymphedema   Skin: No visible rashes or ulcers. psoriasis   Psych:  Mood & affect appropriate    LABS: All Labs Reviewed:                        11.   8.09  )-----------( 228      ( 2024 06:41 )             38.2                         11.8   9.53  )-----------( 206      ( 2024 07:55 )             37.9     2024 06:41    134    |  103    |  63     ----------------------------<  144    3.9     |  28     |  2.70   2024 07:55    133    |  103    |  64     ----------------------------<  177    4.1     |  25     |  3.00     Ca    8.5        2024 06:41  Ca    8.9        2024 07:55  Phos  4.0       2024 06:41  Mg     2.3       2024 06:41    TPro  7.1    /  Alb  3.1    /  TBili  0.4    /  DBili  x      /  AST  15     /  ALT  13     /  AlkPhos  60     2024 07:55             EC Lead ECG:   Ventricular Rate 62 BPM    Atrial Rate 62 BPM    P-R Interval 280 ms    QRS Duration 168 ms    Q-T Interval 522 ms    QTC Calculation(Bazett) 529 ms    P Axis 39 degrees    R Axis 260 degrees    T Axis 89 degrees    Diagnosis Line Sinus rhythm withsinus arrhythmia with 1st degree AV block  Right superior axis deviation  Nonspecific intraventricular block  Abnormal ECG  When compared with ECG of 11-AUG-2023 09:52,  premature atrial complexes are no longer present  T wave inversion no longer evident in Lateral leads  Confirmed by IMAN MONROE ([id number]) on 2024 6:10:30 PM (24 @ 09:46)      TRANSTHORACIC ECHOCARDIOGRAM REPORT  ________________________________________________________________________________                                      _______       Pt. Name:       EVELYN LOPEZ Study Date:    2023  MRN:            [id number]         YOB: 1950  Accession #:    [id number]        Age:           72 years  Account#:       [id number]       Gender:        F  Heart Rate:                      Height:        70.87 in (180.00 cm)  Rhythm:                          Weight:        ( )  Blood Pressure: 119/57 mmHg      BSA/BMI:       /  ________________________________________________________________________________________  Referring Physician:    [id number] Luis Mcdermott  Interpreting Physician: Skylar Hernández MD  Primary Sonographer:    AS    CPT:               ECHO TTE WO CON COMP W DOPP - 55245.m  Indication(s):     Cardiac murmur, unspecified - R01.1  Procedure:         Transthoracic echocardiogram with 2-D, M-mode and complete                     spectral and color flow Doppler.  Ordering Location: [facility]    _______________________________________________________________________________________  CONCLUSIONS:      1. The left ventricular systolic function is mildly decreased with an ejection fraction of 38 % by Nunes's method of disks.   2. Normal right ventricular cavity size and probably normal systolic function.   3. The left atrium is mildly dilated.   4. The right atrium is normal.   5. Moderate calcification of the aortic valve leaflets.  6. Moderate aortic stenosis.   7. There is moderate calcification of the mitral valve annulus.   8. Trace mitral regurgitation.   9. Trace tricuspid regurgitation.  10. Pulmonic valve was not well visualized.  11. The inferior vena cava is normal measuring 1.86 cm in diameter, (normal <2.1cm) with normal inspiratory collapse (normal >50%) consistent with normal right atrial pressure (~3, range 0-5mmHg).    ________________________________________________________________________________________  FINDINGS:     Left Ventricle:  The left ventricular systolic function is mildly decreased with a calculated ejection fraction of 38 % by the Nunes's biplane method of disks.     Right Ventricle:  Normal right ventricular cavity size and probably normal systolic function. Tricuspid annular plane systolic excursion (TAPSE) is 2.7 cm (normal >=1.7 cm).     Left Atrium:  The left atrium is mildly dilated.     Right Atrium:  The right atrium is normal.     Aortic Valve:  There is moderate calcification of the aortic valve leaflets. There is moderate aortic stenosis. The peak transaortic velocity is 3.80 m/s, peak transaortic gradient is 57.8 mmHg and mean transaortic gradient is 31.0 mmHg with an LVOT/aortic valve VTI ratio of 0.23. The aortic valve area is estimated at 0.64 cm² by the continuity equation.     Mitral Valve:  There is mitral valve thickening of the anterior and posterior leaflets. There is moderate calcification of the mitral valve annulus. There is trace mitral regurgitation.     Tricuspid Valve:  There is trace tricuspid regurgitation.     Pulmonic Valve:  The pulmonic valve was not well visualized.     Systemic Veins:  The inferior vena cava is normal measuring 1.86 cm in diameter, (normal <2.1cm) with normal inspiratorycollapse (normal >50%) consistent with normal right atrial pressure (~3, range 0-5mmHg).  ____________________________________________________________________  QUANTITATIVE DATA  Left Ventricle Measurements                         Indexed BSA  IVSd (2D):   1.1 cm  LVPWd (2D):  1.3 cm  LVIDd (2D):  5.2 cm  LVIDs (2D):  4.3 cm  LV Mass:     247 g  LV Vol d, MOD A2C: 111.0 ml  LV Vol d, MOD A4C: 112.0 ml  LV Vol d, MOD BP:  116.7 ml  LV Vol s, MOD A2C: 70.9 ml  LV Vol s, MOD A4C: 67.8 ml  LV Vol s, MOD BP:  72.9 ml  LVOT SV MOD BP:    43.8 ml  LV EF% MOD BP:     38 %     MV E Vmax:    0.89 m/s  MV A Vmax:    1.22 m/s  MV E/A:       0.73  e' lateral:   12.60 cm/s  e' medial:    11.60 cm/s  E/e' lateral: 7.10  E/e' medial:  7.71  E/e' Average: 7.39  MV DT:        207 msec       Left Atrium Measurements     LA Diam 2D: 4.20 cm    Right Ventricle Measurements     TAPSE:            2.7 cm  TV Preeti. S':       15.50 cm/s  RV Base (RVID1):  3.5 cm  RV Mid (RVID2):   3.1 cm  RV Major (RVID3): 8.3 cm       LVOT / RVOT/ Qp/Qs Data:  LVOT Diameter: 1.90 cm  LVOT Vmax:     0.83 m/s  LVOT VTI:      17.50 cm  LVOT SV:       49.6 ml    Aortic Valve Measurements     AV Vmax:          380.0 cm/s  AV Peak Gradient: 57.8 mmHg  AV Mean Gradient: 31.0 mmHg  AVVTI:           77.6 cm  AV VTI Ratio:     0.23  AoV EOA, Contin:  0.64 cm²    Mitral Valve Measurements     MV E Vmax: 0.9 m/s  MV A Vmax: 1.2 m/s  MV E/A:    0.7       Tricuspid Valve Measurements     RA Pressure: 3 mmHg    ________________________________________________________________________________________  Diagnosing Physician: Skylar Hernández MD.  Electronically signed on 2023 at 6:14:52 PM by Skylar Hernández MD         *** Final ***      Radiology:         [facility] -- Howard Kimble Pannella, Patel, Savella Goodger, Cohen  Office # [phone]      Follow Up:  N/V , hx heart failure, htn lbbb     Subjective/Observations:   No events overnight resting comfortably in bed.  No complaints of chest pain, dyspnea, or palpitations reported. No signs of orthopnea or PND.  on room air   denies any further n/v/d    REVIEW OF SYSTEMS: All other review of systems is negative unless indicated above    PAST MEDICAL & SURGICAL HISTORY:  Hypertension      Diabetes      Lymphedema      H/O left bundle branch block      History of left bundle branch block (LBBB)      Systolic heart failure, chronic      H/O cataract        History of surgical removal of meniscus of knee  left in       Frozen shoulder        H/O Achilles tendon repair  lengthened bilaterally,       Fractured skull            MEDICATIONS  (STANDING):  aspirin  chewable 81 milliGRAM(s) Oral daily  atorvastatin 80 milliGRAM(s) Oral at bedtime  cefuroxime   Tablet 250 milliGRAM(s) Oral every 12 hours  dextrose 5%. 1000 milliLiter(s) (100 mL/Hr) IV Continuous <Continuous>  dextrose 5%. 1000 milliLiter(s) (50 mL/Hr) IV Continuous <Continuous>  dextrose 50% Injectable 25 Gram(s) IV Push once  dextrose 50% Injectable 25 Gram(s) IV Push once  dextrose 50% Injectable 12.5 Gram(s) IV Push once  glucagon  Injectable 1 milliGRAM(s) IntraMuscular once  heparin   Injectable 5000 Unit(s) SubCutaneous every 8 hours  insulin glargine Injectable (LANTUS) 26 Unit(s) SubCutaneous at bedtime  insulin lispro (ADMELOG) corrective regimen sliding scale   SubCutaneous at bedtime  insulin lispro (ADMELOG) corrective regimen sliding scale   SubCutaneous three times a day before meals  levothyroxine 75 MICROGram(s) Oral daily  metoprolol succinate  milliGRAM(s) Oral daily  nystatin Powder 1 Application(s) Topical three times a day  pantoprazole    Tablet 40 milliGRAM(s) Oral before breakfast  sodium chloride 0.9%. 1000 milliLiter(s) (84 mL/Hr) IV Continuous <Continuous>  triamcinolone 0.1% Cream 1 Application(s) Topical every 12 hours    MEDICATIONS  (PRN):  dextrose Oral Gel 15 Gram(s) Oral once PRN Blood Glucose LESS THAN 70 milliGRAM(s)/deciliter  metoclopramide 10 milliGRAM(s) Oral every 8 hours PRN N/V  trimethobenzamide Injectable 200 milliGRAM(s) IntraMuscular every 8 hours PRN Nausea and/or Vomiting      Allergies    penicillins (Hives)  Ativan (Rash; Urticaria)    Intolerances        Vital Signs Last 24 Hrs  T(C): 36.8 (2024 04:28), Max: 36.9 (2024 20:44)  T(F): 98.3 (2024 04:28), Max: 98.5 (2024 20:44)  HR: 78 (2024 04:28) (61 - 78)  BP: 122/72 (2024 04:28) (122/72 - 147/76)  BP(mean): --  RR: 17 (2024 04:28) (17 - 18)  SpO2: 91% (2024 04:28) (91% - 92%)    Parameters below as of 2024 04:28  Patient On (Oxygen Delivery Method): room air        I&O's Summary    2024 07:01  -  2024 07:00  --------------------------------------------------------  IN: 0 mL / OUT: 1850 mL / NET: -1850 mL          PHYSICAL EXAM:  TELE: not on tele   Constitutional: NAD, awake and alert, obese  HEENT: Moist Mucous Membranes, Anicteric  Pulmonary: Non-labored, breath sounds are clear bilaterally, No wheezing, crackles or rhonchi  Cardiovascular: Regular, S1 and S2 nl, No murmurs, rubs, gallops or clicks  Gastrointestinal: Bowel Sounds present, soft, nontender.   Lymph: chronic lymphedema   Skin: No visible rashes or ulcers. psoriasis   Psych:  Mood & affect appropriate    LABS: All Labs Reviewed:                        11.   8.09  )-----------( 228      ( 2024 06:41 )             38.2                         11.8   9.53  )-----------( 206      ( 2024 07:55 )             37.9     2024 06:41    134    |  103    |  63     ----------------------------<  144    3.9     |  28     |  2.70   2024 07:55    133    |  103    |  64     ----------------------------<  177    4.1     |  25     |  3.00     Ca    8.5        2024 06:41  Ca    8.9        2024 07:55  Phos  4.0       2024 06:41  Mg     2.3       2024 06:41    TPro  7.1    /  Alb  3.1    /  TBili  0.4    /  DBili  x      /  AST  15     /  ALT  13     /  AlkPhos  60     2024 07:55             EC Lead ECG:   Ventricular Rate 62 BPM    Atrial Rate 62 BPM    P-R Interval 280 ms    QRS Duration 168 ms    Q-T Interval 522 ms    QTC Calculation(Bazett) 529 ms    P Axis 39 degrees    R Axis 260 degrees    T Axis 89 degrees    Diagnosis Line Sinus rhythm withsinus arrhythmia with 1st degree AV block  Right superior axis deviation  Nonspecific intraventricular block  Abnormal ECG  When compared with ECG of 11-AUG-2023 09:52,  premature atrial complexes are no longer present  T wave inversion no longer evident in Lateral leads  Confirmed by IMAN MONROE ([id number]) on 2024 6:10:30 PM (24 @ 09:46)      TRANSTHORACIC ECHOCARDIOGRAM REPORT  ________________________________________________________________________________                                      _______       Pt. Name:       EVELYN LOPEZ Study Date:    2023  MRN:            [id number]         YOB: 1950  Accession #:    [id number]        Age:           72 years  Account#:       [id number]       Gender:        F  Heart Rate:                      Height:        70.87 in (180.00 cm)  Rhythm:                          Weight:        ( )  Blood Pressure: 119/57 mmHg      BSA/BMI:       /  ________________________________________________________________________________________  Referring Physician:    [id number] Luis Mcdermott  Interpreting Physician: Skylar Hernández MD  Primary Sonographer:    AS    CPT:               ECHO TTE WO CON COMP W DOPP - 29604.m  Indication(s):     Cardiac murmur, unspecified - R01.1  Procedure:         Transthoracic echocardiogram with 2-D, M-mode and complete                     spectral and color flow Doppler.  Ordering Location: [facility]    _______________________________________________________________________________________  CONCLUSIONS:      1. The left ventricular systolic function is mildly decreased with an ejection fraction of 38 % by Nunes's method of disks.   2. Normal right ventricular cavity size and probably normal systolic function.   3. The left atrium is mildly dilated.   4. The right atrium is normal.   5. Moderate calcification of the aortic valve leaflets.  6. Moderate aortic stenosis.   7. There is moderate calcification of the mitral valve annulus.   8. Trace mitral regurgitation.   9. Trace tricuspid regurgitation.  10. Pulmonic valve was not well visualized.  11. The inferior vena cava is normal measuring 1.86 cm in diameter, (normal <2.1cm) with normal inspiratory collapse (normal >50%) consistent with normal right atrial pressure (~3, range 0-5mmHg).    ________________________________________________________________________________________  FINDINGS:     Left Ventricle:  The left ventricular systolic function is mildly decreased with a calculated ejection fraction of 38 % by the Nunes's biplane method of disks.     Right Ventricle:  Normal right ventricular cavity size and probably normal systolic function. Tricuspid annular plane systolic excursion (TAPSE) is 2.7 cm (normal >=1.7 cm).     Left Atrium:  The left atrium is mildly dilated.     Right Atrium:  The right atrium is normal.     Aortic Valve:  There is moderate calcification of the aortic valve leaflets. There is moderate aortic stenosis. The peak transaortic velocity is 3.80 m/s, peak transaortic gradient is 57.8 mmHg and mean transaortic gradient is 31.0 mmHg with an LVOT/aortic valve VTI ratio of 0.23. The aortic valve area is estimated at 0.64 cm² by the continuity equation.     Mitral Valve:  There is mitral valve thickening of the anterior and posterior leaflets. There is moderate calcification of the mitral valve annulus. There is trace mitral regurgitation.     Tricuspid Valve:  There is trace tricuspid regurgitation.     Pulmonic Valve:  The pulmonic valve was not well visualized.     Systemic Veins:  The inferior vena cava is normal measuring 1.86 cm in diameter, (normal <2.1cm) with normal inspiratorycollapse (normal >50%) consistent with normal right atrial pressure (~3, range 0-5mmHg).  ____________________________________________________________________  QUANTITATIVE DATA  Left Ventricle Measurements                         Indexed BSA  IVSd (2D):   1.1 cm  LVPWd (2D):  1.3 cm  LVIDd (2D):  5.2 cm  LVIDs (2D):  4.3 cm  LV Mass:     247 g  LV Vol d, MOD A2C: 111.0 ml  LV Vol d, MOD A4C: 112.0 ml  LV Vol d, MOD BP:  116.7 ml  LV Vol s, MOD A2C: 70.9 ml  LV Vol s, MOD A4C: 67.8 ml  LV Vol s, MOD BP:  72.9 ml  LVOT SV MOD BP:    43.8 ml  LV EF% MOD BP:     38 %     MV E Vmax:    0.89 m/s  MV A Vmax:    1.22 m/s  MV E/A:       0.73  e' lateral:   12.60 cm/s  e' medial:    11.60 cm/s  E/e' lateral: 7.10  E/e' medial:  7.71  E/e' Average: 7.39  MV DT:        207 msec       Left Atrium Measurements     LA Diam 2D: 4.20 cm    Right Ventricle Measurements     TAPSE:            2.7 cm  TV Preeti. S':       15.50 cm/s  RV Base (RVID1):  3.5 cm  RV Mid (RVID2):   3.1 cm  RV Major (RVID3): 8.3 cm       LVOT / RVOT/ Qp/Qs Data:  LVOT Diameter: 1.90 cm  LVOT Vmax:     0.83 m/s  LVOT VTI:      17.50 cm  LVOT SV:       49.6 ml    Aortic Valve Measurements     AV Vmax:          380.0 cm/s  AV Peak Gradient: 57.8 mmHg  AV Mean Gradient: 31.0 mmHg  AVVTI:           77.6 cm  AV VTI Ratio:     0.23  AoV EOA, Contin:  0.64 cm²    Mitral Valve Measurements     MV E Vmax: 0.9 m/s  MV A Vmax: 1.2 m/s  MV E/A:    0.7       Tricuspid Valve Measurements     RA Pressure: 3 mmHg    ________________________________________________________________________________________  Diagnosing Physician: Skylar Hernández MD.  Electronically signed on 2023 at 6:14:52 PM by Skylar Hernández MD         *** Final ***      Radiology:

## 2024-01-13 NOTE — PROGRESS NOTE ADULT - PROVIDER SPECIALTY LIST ADULT
Gastroenterology
Cardiology
Infectious Disease
Nephrology
Infectious Disease
Nephrology
Hospitalist

## 2024-01-13 NOTE — PROGRESS NOTE ADULT - ASSESSMENT
Acute on CKD 4  HTN  N/V  LE Edema  Hyponatremia    -Baseline Creatinine 2.1  -Renal function now starting to improve   -BP controlled  -Cont IVF  -Has chronic LE edema, but will hold diuretic at this time as she has had poor intake with N/V  -Daily chem; monitor trend in Cr for now; check bladder scan  -Increased IVF and change to NS with hyponatremia  -DC lasix for now, monitor respiratory status  -Creatinine improving

## 2024-01-13 NOTE — DISCHARGE NOTE NURSING/CASE MANAGEMENT/SOCIAL WORK - NSDCPEFALRISK_GEN_ALL_CORE
For information on Fall & Injury Prevention, visit: [email or web address] OR  [email or web address] OR  https://www.cdc.gov/steadi/patient.html For information on Fall & Injury Prevention, visit: [email or web address] OR  [email or web address] OR  https://www.cdc.gov/steadi/patient.html

## 2024-01-13 NOTE — PROGRESS NOTE ADULT - SUBJECTIVE AND OBJECTIVE BOX
Patient is a 73y old  Female who presents with a chief complaint of UTI (09 Jan 2024 16:52)       HPI:  74yo F with PMHx T2DM, HFrEF, HTN, hypothyroidism, LBBB, chronic lymphedema, CKD presents to the ED for nausea and vomiting. Pt came to the ED yesterday for nausea and non-bloody emesis, was found to have a UTI, and sent home on Cipro. Pt unable to tolerate PO at home so she came back to the ED. Pt states that partner has been sick at home. No recent travel.  Pt recently admitted to [facility] [place] fo e.coli bacteremia from Aug 11-15.   Of note, Pt additionally admitted in June with septic shock and E. coli bacteremia 2/2 left pyelo with hydro- no obtructive uropathy on CT. She was on pressors and given 10 days of iv rocephin.   Sees Dr. Wilde for outpatient nephrologist.  Was previously on dialysis but was able to come off. Has had decreased PO intake and N/V. + sick contacts.       Still with nausea but improved. NO SOB.  Urinating     PAST MEDICAL & SURGICAL HISTORY:  Hypertension      Diabetes      Lymphedema      H/O left bundle branch block      History of left bundle branch block (LBBB)      Systolic heart failure, chronic      H/O cataract  2020      History of surgical removal of meniscus of knee  left in 1971      Frozen shoulder  2000      H/O Achilles tendon repair  lengthened bilaterally, 2000      Fractured skull  1968           FAMILY HISTORY:  Family history of CVA  mom, age 57    [place]    Social History:Non smoker    MEDICATIONS  (STANDING):  cefTRIAXone   IVPB 1000 milliGRAM(s) IV Intermittent every 24 hours  dextrose 5%. 1000 milliLiter(s) (50 mL/Hr) IV Continuous <Continuous>  dextrose 5%. 1000 milliLiter(s) (100 mL/Hr) IV Continuous <Continuous>  dextrose 50% Injectable 25 Gram(s) IV Push once  dextrose 50% Injectable 25 Gram(s) IV Push once  dextrose 50% Injectable 12.5 Gram(s) IV Push once  famotidine  IVPB 20 milliGRAM(s) IV Intermittent daily  furosemide   Injectable 20 milliGRAM(s) IV Push two times a day  glucagon  Injectable 1 milliGRAM(s) IntraMuscular once  heparin   Injectable 5000 Unit(s) SubCutaneous every 8 hours  insulin glargine Injectable (LANTUS) 26 Unit(s) SubCutaneous at bedtime  insulin lispro (ADMELOG) corrective regimen sliding scale   SubCutaneous three times a day before meals  insulin lispro (ADMELOG) corrective regimen sliding scale   SubCutaneous at bedtime  lactated ringers. 1000 milliLiter(s) (70 mL/Hr) IV Continuous <Continuous>  levothyroxine Injectable 56 MICROGram(s) IV Push at bedtime    MEDICATIONS  (PRN):  dextrose Oral Gel 15 Gram(s) Oral once PRN Blood Glucose LESS THAN 70 milliGRAM(s)/deciliter  trimethobenzamide Injectable 200 milliGRAM(s) IntraMuscular every 8 hours PRN Nausea and/or Vomiting   Meds reviewed    Allergies    penicillins (Hives)  Ativan (Rash; Urticaria)    Intolerances         REVIEW OF SYSTEMS:  as above      ICU Vital Signs Last 24 Hrs  T(C): 36.7 (12 Jan 2024 12:30), Max: 36.8 (12 Jan 2024 05:31)  T(F): 98 (12 Jan 2024 12:30), Max: 98.2 (12 Jan 2024 05:31)  HR: 61 (12 Jan 2024 12:30) (61 - 64)  BP: 147/76 (12 Jan 2024 12:30) (119/76 - 147/76)  BP(mean): --  ABP: --  ABP(mean): --  RR: 18 (12 Jan 2024 12:30) (18 - 18)  SpO2: 92% (12 Jan 2024 12:30) (91% - 92%)    O2 Parameters below as of 12 Jan 2024 05:31  Patient On (Oxygen Delivery Method): room air            PHYSICAL EXAM:    GENERAL: ill appearing  HEAD:  Atraumatic, Normocephalic  EYES: EOMI, conjunctiva and sclera clear  ENMT: No Drainage from nares, No drainage from ears  NERVOUS SYSTEM:  Awake and Alert  CHEST/LUNG: Clear to percussion bilaterally  EXTREMITIES:  ++Edema  SKIN: No rashes No obvious ecchymosis      LABS:                        11.7   8.15  )-----------( 223      ( 13 Jan 2024 06:28 )             37.4     01-12    134<L>  |  103  |  63<H>  ----------------------------<  144<H>  3.9   |  28  |  2.70<H>    Ca    8.5      12 Jan 2024 06:41  Phos  4.0     01-12  Mg     2.3     01-12        Urinalysis Basic - ( 12 Jan 2024 06:41 )    Color: x / Appearance: x / SG: x / pH: x  Gluc: 144 mg/dL / Ketone: x  / Bili: x / Urobili: x   Blood: x / Protein: x / Nitrite: x   Leuk Esterase: x / RBC: x / WBC x   Sq Epi: x / Non Sq Epi: x / Bacteria: x                   Patient is a 73y old  Female who presents with a chief complaint of UTI (09 Jan 2024 16:52)       HPI:  72yo F with PMHx T2DM, HFrEF, HTN, hypothyroidism, LBBB, chronic lymphedema, CKD presents to the ED for nausea and vomiting. Pt came to the ED yesterday for nausea and non-bloody emesis, was found to have a UTI, and sent home on Cipro. Pt unable to tolerate PO at home so she came back to the ED. Pt states that partner has been sick at home. No recent travel.  Pt recently admitted to [facility] fo e.coli bacteremia from Aug 11-15.   Of note, Pt additionally admitted in June with septic shock and E. coli bacteremia 2/2 left pyelo with hydro- no obtructive uropathy on CT. She was on pressors and given 10 days of iv rocephin.   Sees Dr. Wilde for outpatient nephrologist.  Was previously on dialysis but was able to come off. Has had decreased PO intake and N/V. + sick contacts.       Still with nausea but improved. NO SOB.  Urinating     PAST MEDICAL & SURGICAL HISTORY:  Hypertension      Diabetes      Lymphedema      H/O left bundle branch block      History of left bundle branch block (LBBB)      Systolic heart failure, chronic      H/O cataract  2020      History of surgical removal of meniscus of knee  left in 1971      Frozen shoulder  2000      H/O Achilles tendon repair  lengthened bilaterally, 2000      Fractured skull  1968           FAMILY HISTORY:  Family history of CVA  mom, age 57    [place]    Social History:Non smoker    MEDICATIONS  (STANDING):  cefTRIAXone   IVPB 1000 milliGRAM(s) IV Intermittent every 24 hours  dextrose 5%. 1000 milliLiter(s) (50 mL/Hr) IV Continuous <Continuous>  dextrose 5%. 1000 milliLiter(s) (100 mL/Hr) IV Continuous <Continuous>  dextrose 50% Injectable 25 Gram(s) IV Push once  dextrose 50% Injectable 25 Gram(s) IV Push once  dextrose 50% Injectable 12.5 Gram(s) IV Push once  famotidine  IVPB 20 milliGRAM(s) IV Intermittent daily  furosemide   Injectable 20 milliGRAM(s) IV Push two times a day  glucagon  Injectable 1 milliGRAM(s) IntraMuscular once  heparin   Injectable 5000 Unit(s) SubCutaneous every 8 hours  insulin glargine Injectable (LANTUS) 26 Unit(s) SubCutaneous at bedtime  insulin lispro (ADMELOG) corrective regimen sliding scale   SubCutaneous three times a day before meals  insulin lispro (ADMELOG) corrective regimen sliding scale   SubCutaneous at bedtime  lactated ringers. 1000 milliLiter(s) (70 mL/Hr) IV Continuous <Continuous>  levothyroxine Injectable 56 MICROGram(s) IV Push at bedtime    MEDICATIONS  (PRN):  dextrose Oral Gel 15 Gram(s) Oral once PRN Blood Glucose LESS THAN 70 milliGRAM(s)/deciliter  trimethobenzamide Injectable 200 milliGRAM(s) IntraMuscular every 8 hours PRN Nausea and/or Vomiting   Meds reviewed    Allergies    penicillins (Hives)  Ativan (Rash; Urticaria)    Intolerances         REVIEW OF SYSTEMS:  as above      ICU Vital Signs Last 24 Hrs  T(C): 36.7 (12 Jan 2024 12:30), Max: 36.8 (12 Jan 2024 05:31)  T(F): 98 (12 Jan 2024 12:30), Max: 98.2 (12 Jan 2024 05:31)  HR: 61 (12 Jan 2024 12:30) (61 - 64)  BP: 147/76 (12 Jan 2024 12:30) (119/76 - 147/76)  BP(mean): --  ABP: --  ABP(mean): --  RR: 18 (12 Jan 2024 12:30) (18 - 18)  SpO2: 92% (12 Jan 2024 12:30) (91% - 92%)    O2 Parameters below as of 12 Jan 2024 05:31  Patient On (Oxygen Delivery Method): room air            PHYSICAL EXAM:    GENERAL: ill appearing  HEAD:  Atraumatic, Normocephalic  EYES: EOMI, conjunctiva and sclera clear  ENMT: No Drainage from nares, No drainage from ears  NERVOUS SYSTEM:  Awake and Alert  CHEST/LUNG: Clear to percussion bilaterally  EXTREMITIES:  ++Edema  SKIN: No rashes No obvious ecchymosis      LABS:                        11.7   8.15  )-----------( 223      ( 13 Jan 2024 06:28 )             37.4     01-12    134<L>  |  103  |  63<H>  ----------------------------<  144<H>  3.9   |  28  |  2.70<H>    Ca    8.5      12 Jan 2024 06:41  Phos  4.0     01-12  Mg     2.3     01-12        Urinalysis Basic - ( 12 Jan 2024 06:41 )    Color: x / Appearance: x / SG: x / pH: x  Gluc: 144 mg/dL / Ketone: x  / Bili: x / Urobili: x   Blood: x / Protein: x / Nitrite: x   Leuk Esterase: x / RBC: x / WBC x   Sq Epi: x / Non Sq Epi: x / Bacteria: x

## 2024-01-13 NOTE — PROGRESS NOTE ADULT - SUBJECTIVE AND OBJECTIVE BOX
[facility]  Franklin Smith PA-C  [address] [place] [address]  [phone]        INTERVAL HPI:  Pt s/e  Nausea/vomiting now resolving  Tolerating clear liquid diet    MEDICATIONS  (STANDING):  aspirin  chewable 81 milliGRAM(s) Oral daily  atorvastatin 80 milliGRAM(s) Oral at bedtime  cefuroxime   Tablet 250 milliGRAM(s) Oral every 12 hours  dextrose 5%. 1000 milliLiter(s) (50 mL/Hr) IV Continuous <Continuous>  dextrose 5%. 1000 milliLiter(s) (100 mL/Hr) IV Continuous <Continuous>  dextrose 50% Injectable 12.5 Gram(s) IV Push once  dextrose 50% Injectable 25 Gram(s) IV Push once  dextrose 50% Injectable 25 Gram(s) IV Push once  glucagon  Injectable 1 milliGRAM(s) IntraMuscular once  heparin   Injectable 5000 Unit(s) SubCutaneous every 8 hours  insulin glargine Injectable (LANTUS) 26 Unit(s) SubCutaneous at bedtime  insulin lispro (ADMELOG) corrective regimen sliding scale   SubCutaneous at bedtime  insulin lispro (ADMELOG) corrective regimen sliding scale   SubCutaneous three times a day before meals  levothyroxine 75 MICROGram(s) Oral daily  metoprolol succinate  milliGRAM(s) Oral daily  nystatin Powder 1 Application(s) Topical three times a day  pantoprazole    Tablet 40 milliGRAM(s) Oral before breakfast  sodium chloride 0.9%. 1000 milliLiter(s) (84 mL/Hr) IV Continuous <Continuous>  triamcinolone 0.1% Cream 1 Application(s) Topical every 12 hours    MEDICATIONS  (PRN):  dextrose Oral Gel 15 Gram(s) Oral once PRN Blood Glucose LESS THAN 70 milliGRAM(s)/deciliter  metoclopramide 10 milliGRAM(s) Oral every 8 hours PRN N/V  trimethobenzamide Injectable 200 milliGRAM(s) IntraMuscular every 8 hours PRN Nausea and/or Vomiting      Allergies    penicillins (Hives)  Ativan (Rash; Urticaria)    Intolerances      Vital Signs Last 24 Hrs  T(C): 36.8 (13 Jan 2024 04:28), Max: 36.9 (12 Jan 2024 20:44)  T(F): 98.3 (13 Jan 2024 04:28), Max: 98.5 (12 Jan 2024 20:44)  HR: 78 (13 Jan 2024 04:28) (61 - 78)  BP: 122/72 (13 Jan 2024 04:28) (122/72 - 147/76)  BP(mean): --  RR: 17 (13 Jan 2024 04:28) (17 - 18)  SpO2: 91% (13 Jan 2024 04:28) (91% - 92%)    Parameters below as of 13 Jan 2024 04:28  Patient On (Oxygen Delivery Method): room air    GENERAL:  Appears stated age  HEENT:  NC/AT  CHEST:  Full & symmetric excursion  HEART:  Regular rhythm  ABDOMEN:  Soft, non-tender, non-distended  EXTEREMITIES:  no cyanosis, clubbing or edema  SKIN:  No rash  NEURO:  Alert      LABS:                        11.7   8.15  )-----------( 223      ( 13 Jan 2024 06:28 )             37.4     01-13    140  |  106  |  57<H>  ----------------------------<  37<LL>  3.8   |  27  |  2.50<H>    Ca    8.1<L>      13 Jan 2024 06:28  Phos  4.0     01-12  Mg     2.3     01-12        Imaging:  < from: CT Abdomen and Pelvis No Cont (01.10.24 @ 08:12) >    ACC: [id number] EXAM:  CT ABDOMEN AND PELVIS   ORDERED BY:  TEMO SMITH     PROCEDURE DATE:  01/10/2024          INTERPRETATION:  CLINICAL INFORMATION: 73 years  Female with Nausea,   vomiting.    COMPARISON: Noncontrast CT abdomen and pelvis 8/11/2023    CONTRAST/COMPLICATIONS:  IV Contrast: NONE  Oral Contrast: NONE  Complications: None reported at time of study completion    PROCEDURE:  CT of the Abdomen and Pelvis was performed.  Sagittal and coronal reformats were performed.    FINDINGS:  LOWERCHEST: Small bilateral pleural effusions, larger on the right. Mild   cardiomegaly.    LIVER: Within normal limits.  BILE DUCTS: Normal caliber.  GALLBLADDER: Cholelithiasis.  SPLEEN: Within normal limits.  PANCREAS: Within normal limits.  ADRENALS: Within normal limits.  KIDNEYS/URETERS: Mild atrophy. No hydronephrosis. Bilateral nonspecific   perinephric fat stranding.    BLADDER: Within normal limits.  REPRODUCTIVE ORGANS: Unremarkable uterus.    BOWEL: No bowel obstruction. Unremarkable appendix. New right lower   quadrant metallic clip, possibly hemostatic clip in the cecum.  PERITONEUM: No ascites.  VESSELS: Atherosclerotic changes.  RETROPERITONEUM/LYMPH NODES: No lymphadenopathy. Periaortic lymph nodes   measuring up to 5 mm short axis.  ABDOMINAL WALL: Anasarca.  BONES: Bilateral L5 spondylolysis with grade 1 L5-S1 anterolisthesis.   Degenerative changes.    IMPRESSION:  Small bilateral pleural effusions, larger on the right. Mild cardiomegaly.    No acute intra-abdominal pathology.    New right lower quadrant metallic clip, possibly hemostatic clip in the   cecum. Correlate clinically.    --- End of Report ---      JALIL RODRIGUEZ MD; Attending Radiologist  This document has been electronically signed. Manuel 10 2024 10:32AM    < end of copied text >           [facility]  Franklin Smith PA-C  [address] [place] [address]  [phone]        INTERVAL HPI:  Pt s/e  Nausea/vomiting now resolving  Tolerating clear liquid diet    MEDICATIONS  (STANDING):  aspirin  chewable 81 milliGRAM(s) Oral daily  atorvastatin 80 milliGRAM(s) Oral at bedtime  cefuroxime   Tablet 250 milliGRAM(s) Oral every 12 hours  dextrose 5%. 1000 milliLiter(s) (50 mL/Hr) IV Continuous <Continuous>  dextrose 5%. 1000 milliLiter(s) (100 mL/Hr) IV Continuous <Continuous>  dextrose 50% Injectable 12.5 Gram(s) IV Push once  dextrose 50% Injectable 25 Gram(s) IV Push once  dextrose 50% Injectable 25 Gram(s) IV Push once  glucagon  Injectable 1 milliGRAM(s) IntraMuscular once  heparin   Injectable 5000 Unit(s) SubCutaneous every 8 hours  insulin glargine Injectable (LANTUS) 26 Unit(s) SubCutaneous at bedtime  insulin lispro (ADMELOG) corrective regimen sliding scale   SubCutaneous at bedtime  insulin lispro (ADMELOG) corrective regimen sliding scale   SubCutaneous three times a day before meals  levothyroxine 75 MICROGram(s) Oral daily  metoprolol succinate  milliGRAM(s) Oral daily  nystatin Powder 1 Application(s) Topical three times a day  pantoprazole    Tablet 40 milliGRAM(s) Oral before breakfast  sodium chloride 0.9%. 1000 milliLiter(s) (84 mL/Hr) IV Continuous <Continuous>  triamcinolone 0.1% Cream 1 Application(s) Topical every 12 hours    MEDICATIONS  (PRN):  dextrose Oral Gel 15 Gram(s) Oral once PRN Blood Glucose LESS THAN 70 milliGRAM(s)/deciliter  metoclopramide 10 milliGRAM(s) Oral every 8 hours PRN N/V  trimethobenzamide Injectable 200 milliGRAM(s) IntraMuscular every 8 hours PRN Nausea and/or Vomiting      Allergies    penicillins (Hives)  Ativan (Rash; Urticaria)    Intolerances      Vital Signs Last 24 Hrs  T(C): 36.8 (13 Jan 2024 04:28), Max: 36.9 (12 Jan 2024 20:44)  T(F): 98.3 (13 Jan 2024 04:28), Max: 98.5 (12 Jan 2024 20:44)  HR: 78 (13 Jan 2024 04:28) (61 - 78)  BP: 122/72 (13 Jan 2024 04:28) (122/72 - 147/76)  BP(mean): --  RR: 17 (13 Jan 2024 04:28) (17 - 18)  SpO2: 91% (13 Jan 2024 04:28) (91% - 92%)    Parameters below as of 13 Jan 2024 04:28  Patient On (Oxygen Delivery Method): room air    GENERAL:  Appears stated age  HEENT:  NC/AT  CHEST:  Full & symmetric excursion  HEART:  Regular rhythm  ABDOMEN:  Soft, non-tender, non-distended  EXTEREMITIES:  no cyanosis, clubbing or edema  SKIN:  No rash  NEURO:  Alert      LABS:                        11.7   8.15  )-----------( 223      ( 13 Jan 2024 06:28 )             37.4     01-13    140  |  106  |  57<H>  ----------------------------<  37<LL>  3.8   |  27  |  2.50<H>    Ca    8.1<L>      13 Jan 2024 06:28  Phos  4.0     01-12  Mg     2.3     01-12        Imaging:  < from: CT Abdomen and Pelvis No Cont (01.10.24 @ 08:12) >    ACC: [id number] EXAM:  CT ABDOMEN AND PELVIS   ORDERED BY:  TEMO SMITH     PROCEDURE DATE:  01/10/2024          INTERPRETATION:  CLINICAL INFORMATION: 73 years  Female with Nausea,   vomiting.    COMPARISON: Noncontrast CT abdomen and pelvis 8/11/2023    CONTRAST/COMPLICATIONS:  IV Contrast: NONE  Oral Contrast: NONE  Complications: None reported at time of study completion    PROCEDURE:  CT of the Abdomen and Pelvis was performed.  Sagittal and coronal reformats were performed.    FINDINGS:  LOWERCHEST: Small bilateral pleural effusions, larger on the right. Mild   cardiomegaly.    LIVER: Within normal limits.  BILE DUCTS: Normal caliber.  GALLBLADDER: Cholelithiasis.  SPLEEN: Within normal limits.  PANCREAS: Within normal limits.  ADRENALS: Within normal limits.  KIDNEYS/URETERS: Mild atrophy. No hydronephrosis. Bilateral nonspecific   perinephric fat stranding.    BLADDER: Within normal limits.  REPRODUCTIVE ORGANS: Unremarkable uterus.    BOWEL: No bowel obstruction. Unremarkable appendix. New right lower   quadrant metallic clip, possibly hemostatic clip in the cecum.  PERITONEUM: No ascites.  VESSELS: Atherosclerotic changes.  RETROPERITONEUM/LYMPH NODES: No lymphadenopathy. Periaortic lymph nodes   measuring up to 5 mm short axis.  ABDOMINAL WALL: Anasarca.  BONES: Bilateral L5 spondylolysis with grade 1 L5-S1 anterolisthesis.   Degenerative changes.    IMPRESSION:  Small bilateral pleural effusions, larger on the right. Mild cardiomegaly.    No acute intra-abdominal pathology.    New right lower quadrant metallic clip, possibly hemostatic clip in the   cecum. Correlate clinically.    --- End of Report ---      JALIL RODRIGUEZ MD; Attending Radiologist  This document has been electronically signed. Manuel 10 2024 10:32AM    < end of copied text >

## 2024-01-13 NOTE — DISCHARGE NOTE NURSING/CASE MANAGEMENT/SOCIAL WORK - NSDCPEPT PROEDHF_GEN_ALL_CORE
Symptomatic treatment:    Alternate Tylenol and Ibuprofen every 3 hrs for fever, pain and inflammation. Avoid Nsaids if you are pregnant or have advanced kidney disease.     salt water gargles to soothe throat from post nasal drip  Honey/lemon water or warm tea to soothe throat from post nasal drip  Cepachol helps to soothe the discomfort in throat from post nasal drip    Cold-eeze helps to reduce the duration of URI symptoms if taken early  Elderberry to reduce duration of viral URI symptoms    Nasal saline spray reduces inflammation and dryness  Warm face compresses/hot showers as often as you can to open sinuses and allow to drain.   Flonase OTC or Nasacort OTC to help decrease inflammation in nasal turbinates and allow sinuses to drain    Vicks vapor rub at night  Simple foods like chicken noodle soup help provide hydration and nutrition    Delsym helps with coughing at night    Zantac will help if there is reflux from the post nasal drip and helpful to take at night    Zyrtec/Claritin during the day and Benadryl at night may help if allergy component concurrently with URI    Rest as much as you can    Your symptoms will likely last 5-7 days, maybe longer depending on how it affects your body.  You are contagious 5-7, so minimize contact with others to reduce the spread to others and stay home from work or school as we discussed. Dehydration is preventable but is one of the main reasons why you will feel so badly. Drink pedialyte, gatorade or propel. Stay hydrated.  Antibiotics are not needed unless a complication(such as Otitis Media, Bacterial sinus infection or pneumonia) develops. Taking antibiotics for Flu/Cold is not supported by evidence-based medicine and can expose you to unnecessary side effects of the medication, such as anaphylaxis, yeast infection and leads to antibiotic resistance.   If you experience any:  Chest pain, shortness of breath, wheezing or difficulty breathing,  Severe headache, face,  neck or ear pain,  New rash,  Fever over 101.5º F (38.6 C) for more than three days,  Confusion, behavior change or seizure,  Severe weakness or dizziness, please go to the ER immediately for further testing.             Acute Sinusitis    Acute sinusitis is irritation and swelling of the sinuses. It is usually caused by a viral infection after a common cold. Your doctor can help you find relief.  What is acute sinusitis?  Sinuses are air-filled spaces in the skull behind the face. They are kept moist and clean by a lining of mucosa. Things such as pollen, smoke, and chemical fumes can irritate the mucosa. It can then swell up. As a response to irritation, the mucosa makes more mucus and other fluids. Tiny hairlike cilia cover the mucosa. Cilia help carry mucus toward the opening of the sinus. Too much mucus may cause the cilia to stop working. This blocks the sinus opening. A buildup of fluid in the sinuses then causes pain and pressure. It can also encourage bacteria to grow in the sinuses.  Common symptoms of acute sinusitis  You may have:  · Facial soreness pain  · Headache  · Fever  · Fluid draining in the back of the throat (postnasal drip)  · Congestion  · Drainage that is thick and colored, instead of clear  · Cough  Diagnosing acute sinusitis  Your doctor will ask about your symptoms and health history. He or she will look at your ear, nose, and throat. You usually won't need to have X-rays taken.    The doctor may take a sample of mucus to check for bacteria. If you have sinusitis that keeps coming back, you may need imaging tests such as X-rays or CAT scans. This will help your doctor check for a structural problem that may be causing the infection.  Treating acute sinusitis  Treatment is aimed at unblocking the sinus opening and helping the cilia work again. You may need to take antihistamine and decongestant medicine. These can reduce inflammation and decrease the amount of fluid your sinuses make. If  you have a bacterial infection, you will need to take antibiotic medicine for 10 to 14 days. Take this medicine until it is gone, even if you feel better.  Date Last Reviewed: 10/1/2016  © 1433-1209 [employer]. [address] [place] [address], [place] [address]. All rights reserved. This information is not intended as a substitute for professional medical care. Always follow your healthcare professional's instructions.        Self-Care for Sinusitis     Drinking plenty of water can help sinuses drain.   Sinusitis can often be managed with self-care. Self-care can keep sinuses moist and make you feel more comfortable. Remember to follow your doctor's instructions closely. This can make a big difference in getting your sinus problem under control.  Drink fluids  Drinking extra fluids helps thin your mucus. This lets it drain from your sinuses more easily. Have a glass of water every hour or two. A humidifier helps in much the same way. Fluids can also offset the drying effects of certain medicines. If you use a humidifier, follow the product maker's instructions on how to use it. Clean it on a regular schedule.  Use saltwater rinses  Rinses help keep your sinuses and nose moist. Mix a teaspoon of salt in 8 ounces of fresh, warm water. Use a bulb syringe to gently squirt the water into your nose a few times a day. You can also buy ready-made saline nasal sprays.  Apply hot or cold packs  Applying heat to the area surrounding your sinuses may make you feel more comfortable. Use a hot water bottle or a hand towel dipped in hot water. Some people also find ice packs effective for relieving pain.  Medicines  Your doctor may prescribe medications to help treat your sinusitis. If you have an infection, antibiotics can help clear it up. If you are prescribed antibiotics, take all pills on schedule until they are gone, even if you feel better. Decongestants help relieve swelling. Use decongestant sprays for short periods  only under the direction of your doctor. If you have allergies, your doctor may prescribe medications to help relieve them.   Date Last Reviewed: 10/1/2016  © 7842-1169 [employer]. [address] [place] [address], [place] [address]. All rights reserved. This information is not intended as a substitute for professional medical care. Always follow your healthcare professional's instructions.        Preventing Sinusitis    Colds, flu, and allergies make it more likely for you to get sinusitis. Do your best to prevent sinusitis by preventing these problems. Do what you can to avoid getting colds and other infections. Stay away from things that cause allergies (allergens). Keep your sinuses as moist as you can.  Tips for air travel  When traveling on an airplane, use saline nasal spray to keep your sinuses moist. Drink plenty of fluids. You may also want to take a decongestant before you get on the plane.   Prevent colds  Do what you can to avoid being exposed to colds and flu. When possible, take more time to rest when you feel something coming on.  · Wash your hands often. This is especially important during cold and flu season. Try not to touch your face.  · As much as possible, stay away from infected people.  · Follow these standbys for staying healthy: Eat balanced meals, exercise regularly, and get plenty of sleep.  Stay away from allergens  First find out what things youre allergic to. Then take steps to stay away from allergens or irritants in the air such as dust, pollution, and pollen.  · Wear a mask when you clean. Or consider hiring a  to help you stay away from dust.  · Sit in the nonsmoking sections of restaurants.  · Don't go outdoors during peak pollution hours such as rush hour.  · Keep an air conditioner on during allergy season. Clean its filter regularly.  · Ask your healthcare provider about a referral to have an allergy evaluation. Or ask for a referral to see an allergy  specialist.  Boost moisture  Keeping your sinuses moist makes your mucus thinner. This allows your sinuses to drain better. And this helps prevent infection. Ask your doctor about these suggestions:  · Use a humidifier. Clean it often to remove any mold or mildew.  · Drink several glasses of water a day.  · Stay away from drying beverages such as alcohol and coffee.  · Stay away from all types of smoke, which dries out sinus linings. This includes tobacco smoke and chemical smoke in workplace settings.  · Use saltwater rinses.  Date Last Reviewed: 10/1/2016  © 5197-8459 [employer]. [address] [place] [address]. All rights reserved. This information is not intended as a substitute for professional medical care. Always follow your healthcare professional's instructions.         Call primary care provider for follow up after discharge/Activities as tolerated/Low salt diet/Monitor weight daily/Report signs and symptoms to primary care provider

## 2024-01-13 NOTE — DISCHARGE NOTE NURSING/CASE MANAGEMENT/SOCIAL WORK - PATIENT PORTAL LINK FT
You can access the FollowMyHealth Patient Portal offered by [facility] by registering at the following website: [email or web address]. By joining [employer]’s FollowMyHealth portal, you will also be able to view your health information using other applications (apps) compatible with our system. You can access the FollowMyHealth Patient Portal offered by [facility] by registering at the following website: [email or web address]. By joining [employer]’s FollowMyHealth portal, you will also be able to view your health information using other applications (apps) compatible with our system.

## 2024-01-13 NOTE — PROGRESS NOTE ADULT - ASSESSMENT
74yo F with PMHx T2DM, HFrEF, HTN, hypothyroidism, LBBB, chronic lymphedema, CKD presents to the ED for nausea and vomiting, admitted for UTI and decreased PO intake. Cardiology consulted for HFpEF history     known LBBB, 1st degree AVB, QTc prolongation  - EKG showing sinus rhythm rate 62 bpm w/ 1st degree AV block and non-specific intraventricular block. QTc prolonged at 529ms  - Pt w/ known LBBB, AV block noted in outpatient EKG 11/2023  - Relatively unchanged from previous EKG  - Use caution with AV lulu blockers  - Avoid any QT prolonging medications    - /73 continue home toprol     - TTE 6/2023 LVEF 38%  -has chronic LE edema,   -seen by renal DUNCAN On CKD with poor po intake  holding diuretics reassess daily   -fu am labs pending   - Monitor and replete lytes, keep K>4, Mg>2.  - Strict I/Os, daily weights.     cardiac cath in 2022 showed non-obstructing CAD and no intervention indicated at the time  - c/w home ASA and statin  - Monitor for any anginal symptoms or clinical signs of ischemia    - Other cardiovascular workup will depend on clinical course.  - All other workup per primary team.  - Will continue to follow.   72yo F with PMHx T2DM, HFrEF, HTN, hypothyroidism, LBBB, chronic lymphedema, CKD presents to the ED for nausea and vomiting, admitted for UTI and decreased PO intake. Cardiology consulted for HFpEF history     known LBBB, 1st degree AVB, QTc prolongation  - EKG showing sinus rhythm rate 62 bpm w/ 1st degree AV block and non-specific intraventricular block. QTc prolonged at 529ms  - Pt w/ known LBBB, AV block noted in outpatient EKG 11/2023  - Relatively unchanged from previous EKG  - Use caution with AV lulu blockers  - Avoid any QT prolonging medications    - /73 continue home toprol     - TTE 6/2023 LVEF 38%  -has chronic LE edema,   -seen by renal DUNCAN On CKD with poor po intake  holding diuretics reassess daily   -fu am labs pending   - Monitor and replete lytes, keep K>4, Mg>2.  - Strict I/Os, daily weights.     cardiac cath in 2022 showed non-obstructing CAD and no intervention indicated at the time  - c/w home ASA and statin  - Monitor for any anginal symptoms or clinical signs of ischemia    - Other cardiovascular workup will depend on clinical course.  - All other workup per primary team.  - Will continue to follow.   72yo F with PMHx T2DM, HFrEF, HTN, hypothyroidism, LBBB, chronic lymphedema, CKD presents to the ED for nausea and vomiting, admitted for UTI and decreased PO intake. Cardiology consulted for HFpEF history     known LBBB, 1st degree AVB, QTc prolongation  - EKG showing sinus rhythm rate 62 bpm w/ 1st degree AV block and non-specific intraventricular block. QTc prolonged at 529ms  - Pt w/ known LBBB, AV block noted in outpatient EKG 11/2023  - Relatively unchanged from previous EKG  - Use caution with AV lulu blockers  - Avoid any QT prolonging medications    - /73 continue home toprol     - TTE 6/2023 LVEF 38%  -has chronic LE edema, on room air no orthopnea   -seen by renal DUNCAN On CKD with poor po intake  holding diuretics reassess daily   -fu am labs pending   - Monitor and replete lytes, keep K>4, Mg>2.  - Strict I/Os, daily weights.     cardiac cath in 2022 showed non-obstructing CAD and no intervention indicated at the time  - c/w home ASA and statin  - Monitor for any anginal symptoms or clinical signs of ischemia    - Other cardiovascular workup will depend on clinical course.  - All other workup per primary team.  - Will continue to follow.   74yo F with PMHx T2DM, HFrEF, HTN, hypothyroidism, LBBB, chronic lymphedema, CKD presents to the ED for nausea and vomiting, admitted for UTI and decreased PO intake. Cardiology consulted for HFpEF history     known LBBB, 1st degree AVB, QTc prolongation  - EKG showing sinus rhythm rate 62 bpm w/ 1st degree AV block and non-specific intraventricular block. QTc prolonged at 529ms  - Pt w/ known LBBB, AV block noted in outpatient EKG 11/2023  - Relatively unchanged from previous EKG  - Use caution with AV lulu blockers  - Avoid any QT prolonging medications    - /73 continue home toprol     - TTE 6/2023 LVEF 38%  -has chronic LE edema, on room air no orthopnea   -seen by renal DUNCAN On CKD with poor po intake  holding diuretics reassess daily   -fu am labs pending   - Monitor and replete lytes, keep K>4, Mg>2.  - Strict I/Os, daily weights.     cardiac cath in 2022 showed non-obstructing CAD and no intervention indicated at the time  - c/w home ASA and statin  - Monitor for any anginal symptoms or clinical signs of ischemia    - Other cardiovascular workup will depend on clinical course.  - All other workup per primary team.  - Will continue to follow.

## 2024-01-13 NOTE — DISCHARGE NOTE PROVIDER - CARE PROVIDERS DIRECT ADDRESSES
[email or web address]

## 2024-01-26 PROBLEM — I10 HYPERTENSION: Status: ACTIVE | Noted: 2021-01-29

## 2024-01-26 PROBLEM — E11.22 TYPE 2 DIABETES MELLITUS WITH STAGE 4 CHRONIC KIDNEY DISEASE, WITH LONG-TERM CURRENT USE OF INSULIN: Status: ACTIVE | Noted: 2024-01-01

## 2024-01-26 PROBLEM — E78.5 HYPERLIPIDEMIA: Status: ACTIVE | Noted: 2021-01-29

## 2024-01-26 PROBLEM — E03.9 HYPOTHYROID: Status: ACTIVE | Noted: 2022-05-09

## 2024-01-26 NOTE — PHYSICAL EXAM
[No Acute Distress] : no acute distress [Well Developed] : well developed [Well Nourished] : well nourished [Well-Appearing] : well-appearing [Normal Sclera/Conjunctiva] : normal sclera/conjunctiva [PERRL] : pupils equal round and reactive to light [Normal Outer Ear/Nose] : the outer ears and nose were normal in appearance [EOMI] : extraocular movements intact [Normal Oropharynx] : the oropharynx was normal [Normal TMs] : both tympanic membranes were normal [No JVD] : no jugular venous distention [No Lymphadenopathy] : no lymphadenopathy [Supple] : supple [Thyroid Normal, No Nodules] : the thyroid was normal and there were no nodules present [No Accessory Muscle Use] : no accessory muscle use [No Respiratory Distress] : no respiratory distress  [Clear to Auscultation] : lungs were clear to auscultation bilaterally [Regular Rhythm] : with a regular rhythm [Normal Rate] : normal rate  [No Carotid Bruits] : no carotid bruits [Normal S1, S2] : normal S1 and S2 [No Abdominal Bruit] : a ~M bruit was not heard ~T in the abdomen [Pedal Pulses Present] : the pedal pulses are present [No Palpable Aorta] : no palpable aorta [No Extremity Clubbing/Cyanosis] : no extremity clubbing/cyanosis [Non Tender] : non-tender [Soft] : abdomen soft [Non-distended] : non-distended [No Masses] : no abdominal mass palpated [No HSM] : no HSM [Normal Posterior Cervical Nodes] : no posterior cervical lymphadenopathy [Normal Bowel Sounds] : normal bowel sounds [No CVA Tenderness] : no CVA  tenderness [Normal Anterior Cervical Nodes] : no anterior cervical lymphadenopathy [No Joint Swelling] : no joint swelling [No Spinal Tenderness] : no spinal tenderness [Grossly Normal Strength/Tone] : grossly normal strength/tone [Coordination Grossly Intact] : coordination grossly intact [No Focal Deficits] : no focal deficits [Normal Gait] : normal gait [Deep Tendon Reflexes (DTR)] : deep tendon reflexes were 2+ and symmetric [Speech Grossly Normal] : speech grossly normal [Normal Affect] : the affect was normal [Alert and Oriented x3] : oriented to person, place, and time [Normal Mood] : the mood was normal [Normal Insight/Judgement] : insight and judgment were intact [97392 - High Complexity requires an extensive number of possible diagnoses and/or the management options, extensive complexity of the medical data (tests, etc.) to be reviewed, and a high risk of significant complications, morbidity, and/or mortality as w] : High Complexity  [de-identified] : with murmur  [de-identified] : wearing teds stocking   Left leg 2-3 plus edema  [de-identified] : obese  [de-identified] : advised the need to see GYN  [de-identified] : Psoriatic patches noted back abdomen trunk

## 2024-01-26 NOTE — HISTORY OF PRESENT ILLNESS
[Post-hospitalization from ___ Hospital] : Post-hospitalization from [unfilled] Hospital [Admitted on: ___] : The patient was admitted on [unfilled] [Discharged on ___] : discharged on [unfilled] [Discharge Med List] : discharge medication list [Med Reconciliation] : medication reconciliation has been completed [Patient Contacted By: ____] : and contacted by [unfilled] [FreeTextEntry2] : Mrs. LOPEZ is a 73 year- female, with a past medical history as noted below, who present to the office today for hospital followup.  Was admitted for IV abx for a UTI.  Patient states hospital course was unremarkable.Was discharged home on p.o. antibiotics which he completed.  Denies any pain with urination, fever, chills, night sweats, shortness of breath.  Patient states since her discharge she did to speak to her nephrologist who restarted torsemide. Also followed up with endocrinology.  No changes in current medication regimen.  Still has a follow-up with cardiology.  And has an appoint with urology Dr. Garcia next week.  Patient states she has not noticed any increase in her leg swelling since being off of torsemide as well as furosemide

## 2024-01-26 NOTE — COUNSELING
[Encouraged to increase physical activity] : Encouraged to increase physical activity [Decrease Portions] : decrease portions [Good understanding] : Patient has a good understanding of disease, goals and obesity follow-up plan [FreeTextEntry2] : Low glycemic, renal diet low-sodium

## 2024-01-26 NOTE — ASSESSMENT
[FreeTextEntry1] : Ms. LOPEZ is a 73 year female, with a past medical history as noted above, who present to the office today for hospital followup

## 2024-01-26 NOTE — PLAN
[FreeTextEntry1] : Cardiology appointment with  on the 05/ 2024. Advised patient to call the office to see if she can move up that appointment.  Endo -  Dr. Vail  -  Advised patient follow back up with endocrinology to discuss insulin treatment and review her continuous blood glucose monitor  Nephrology Dr. Clark  -  Advised to schedule follow-up appointment with her nephrologist.  Continue with her renal diet.  Check comprehensive metabolic today  - Advised to follow with Dr. Maynard-  Patient unable to leave a urine sample today  Check labs today.  Continue with current medication regimen.  Advised low-sodium diet, renal diet.  Elevate legs.  Continue to wear compression stockings.  If starts to feel shortness of breath or increased weight 5 pounds in 2 days to call office immediately.

## 2024-01-26 NOTE — HEALTH RISK ASSESSMENT
[Intercurrent hospitalizations] : was admitted to the hospital  [Yes] : Yes [Monthly or less (1 pt)] : Monthly or less (1 point) [Never (0 pts)] : Never (0 points) [1 or 2 (0 pts)] : 1 or 2 (0 points) [No] : In the past 12 months have you used drugs other than those required for medical reasons? No [No falls in past year] : Patient reported no falls in the past year [0] : 1) Little interest or pleasure doing things: Not at all (0) [PHQ-2 Negative - No further assessment needed] : PHQ-2 Negative - No further assessment needed [None] : None [# of Members in Household ___] :  household currently consist of [unfilled] member(s) [With Family] : lives with family [Feels Safe at Home] : Feels safe at home [Fully functional (bathing, dressing, toileting, transferring, walking, feeding)] : Fully functional (bathing, dressing, toileting, transferring, walking, feeding) [Fully functional (using the telephone, shopping, preparing meals, housekeeping, doing laundry, using] : Fully functional and needs no help or supervision to perform IADLs (using the telephone, shopping, preparing meals, housekeeping, doing laundry, using transportation, managing medications and managing finances) [Independent] : managing medications [Some assistance needed] : doing laundry [Smoke Detector] : smoke detector [Carbon Monoxide Detector] : carbon monoxide detector [Seat Belt] :  uses seat belt [Never] : Never [de-identified] : [facility]  [de-identified] : very rare  [de-identified] : Cardio, Endo (Dr VAN),  Podiatrist (Dr Mohan), Ophthalmologist (Dr. Feliz Garcia)  [de-identified] : none  [Audit-CScore] : 1 [de-identified] : ADA, Low na diet  [CUE4Egiig] : 0 [Reports changes in vision] : Reports no changes in vision [MammogramComments] : Denies going for the mammogram that was ordered at the last visit. [ColonoscopyComments] : Does not believe in colonoscopies.  States she will not go for [ColonoscopyDate] : refused

## 2024-01-26 NOTE — REVIEW OF SYSTEMS
[Recent Change In Weight] : ~T recent weight change [Lower Ext Edema] : lower extremity edema [Skin Rash] : skin rash [Negative] : Heme/Lymph [Fever] : no fever [Chills] : no chills [Pain] : no pain [Fatigue] : no fatigue [Vision Problems] : no vision problems [Earache] : no earache [Hoarseness] : no hoarseness [Sore Throat] : no sore throat [Chest Pain] : no chest pain [Leg Claudication] : no leg claudication [Paroxysmal Nocturnal Dyspnea] : no paroxysmal nocturnal dyspnea [Shortness Of Breath] : no shortness of breath [Wheezing] : no wheezing [Cough] : no cough [Abdominal Pain] : no abdominal pain [Nausea] : no nausea [Diarrhea] : diarrhea [Vomiting] : no vomiting [Heartburn] : no heartburn [Itching] : no itching [Mole Changes] : no mole changes [Nail Changes] : no nail changes [Hair Changes] : no hair changes [Anxiety] : no anxiety [Swollen Glands] : no swollen glands [FreeTextEntry2] : weight loss

## 2024-02-15 PROBLEM — E11.621 CHRONIC DIABETIC ULCER OF RIGHT FOOT DETERMINED BY EXAMINATION: Status: ACTIVE | Noted: 2023-01-01

## 2024-02-15 PROBLEM — L97.412 CHRONIC ULCER OF PLANTAR SURFACE OF RIGHT MIDFOOT WITH FAT LAYER EXPOSED: Status: ACTIVE | Noted: 2023-01-01

## 2024-02-15 PROBLEM — L84 FOOT CALLUS: Status: ACTIVE | Noted: 2021-05-27

## 2024-02-15 PROBLEM — R23.4 SKIN FISSURE: Status: ACTIVE | Noted: 2023-01-01

## 2024-02-15 NOTE — ASSESSMENT
[Verbal] : Verbal [Patient] : Patient [Good - alert, interested, motivated] : Good - alert, interested, motivated [Foot Care] : foot care [Skin Care] : skin care [Signs and symptoms of infection] : sign and symptoms of infection [Nutrition] : nutrition [How and When to Call] : how and when to call [Patient responsibility to plan of care] : patient responsibility to plan of care [] : Yes [Stable] : stable [Home] : Home [Cane] : Cane [Not Applicable - Long Term Care/Home Health Agency] : Long Term Care/Home Health Agency: Not Applicable [Verbalizes knowledge/Understanding] : Verbalizes knowledge/understanding [FreeTextEntry2] : Infection Prevention Foot and nail care Nutrition and wound healing Pt Demonstrates use of both nonpharmacological and pharmacological pain relief strategies. HBOT WEIGHT REDUCTION STRATEGIES.  [FreeTextEntry4] : Patient here for F/U visit - wound completely healed. Pt doesn't apply anything topical or keep it covered. DPM shaved callous and advised pt to get OTC Urea Cream to soften up callous  Follow up 9-12 Weeks

## 2024-02-15 NOTE — HISTORY OF PRESENT ILLNESS
[FreeTextEntry1] : Patient is 73 year old female presenting for chronic diabetic non-healing wound down to the level of fat - healed.  Patient denies any pain. Patient finished HBOT. Patient has been ambulating in regular shoes. Patient follows up with podiatrist for routine follow up. Denies any other complaints

## 2024-02-15 NOTE — PHYSICAL EXAM
[2 x 2] : 2 x 2  [2+] : left 2+ [Ankle Swelling (On Exam)] : not present [Varicose Veins Of Lower Extremities] : not present [] : not present [Skin Ulcer] : ulcer [Alert] : alert [Oriented to Person] : oriented to person [Oriented to Place] : oriented to place [Oriented to Time] : oriented to time [Calm] : calm [de-identified] : A&Ox3, NAD [de-identified] : HTN, diabetic small vessel disease  [de-identified] : morbid obesity  [de-identified] : s/p right foot sesamoidectomy, 3/5 strength in all quadrants bilaterally [de-identified] : right foot plantar 1st metatarsal fissure with no drainage, no purulence, no edema, no erythema , no signs of infection - healed  [de-identified] : Diminished light touch sensation bilaterally [FreeTextEntry1] : Right Plantar Foot - Healed [de-identified] : Callous [de-identified] : No Product [TWNoteComboBox4] : None [TWNoteComboBox5] : No [de-identified] : No [de-identified] : other [de-identified] : None [de-identified] : None [de-identified] : No [de-identified] : False

## 2024-02-15 NOTE — PLAN
[FreeTextEntry1] : .Patient seen and evaluated. Discussed etiology and treatment plan. Patient verbalized understanding.  Patient buils up callus under the 1st metatarsal head and  Discussed surgical intervention for resection of metatarsal head vs planning of the 1st metatarsal head to alleviate the biomechanical pressure. Patient defers surgical intervention and continue offloading and wound care as needed.  Continue local wound care and offloading. RTC in 8-10 weeks . t Spent 20 minutes for patient care and medical decision making.

## 2024-02-15 NOTE — REVIEW OF SYSTEMS
[Fever] : no fever [Chills] : no chills [Eye Pain] : no eye pain [Earache] : no earache [Loss Of Hearing] : no hearing loss [Chest Pain] : no chest pain [Shortness Of Breath] : no shortness of breath [Cough] : no cough [Abdominal Pain] : no abdominal pain [Joint Stiffness] : joint stiffness [Skin Wound] : skin wound [Anxiety] : no anxiety [Easy Bleeding] : no tendency for easy bleeding [FreeTextEntry2] : morbid obesity [FreeTextEntry5] : HTN , HLD [FreeTextEntry9] : morbid obesity  [de-identified] : right foot plantar 1st metatarsal - fissure noted underlying hyperkeratotic skin  [de-identified] : diabetic neuropathy [de-identified] : KENNY

## 2024-03-29 NOTE — ED PROVIDER NOTE - PHYSICAL EXAMINATION
Constitutional: Awake, Alert, non-toxic. No acute distress.  HEAD: Normocephalic, atraumatic.   EYES: PERRL, EOM intact, conjunctiva and sclera are clear bilaterally.  ENT: External ears normal. No rhinorrhea, no tracheal deviation   NECK: Supple, non-tender  CARDIOVASCULAR: regular rate and rhythm.  RESPIRATORY: Normal respiratory effort; breath sounds with bibasilar crackles. Speaking in full sentences. No accessory muscle use.   ABDOMEN: Soft; non-tender, non-distended. No rebound or guarding.   MSK:  3+ b/l lower extremity edema with known lymphedema, no deformities  SKIN: Warm, dry  NEURO: A&O x3. Sensory and motor functions are grossly intact. Speech is normal. No facial droop.  PSYCH: Appearance and judgement seem appropriate for gender and age.

## 2024-03-29 NOTE — ED ADULT NURSE NOTE - NSFALLHARMRISKINTERV_ED_ALL_ED

## 2024-03-29 NOTE — H&P ADULT - TIME BILLING
Pt seen and examined at bedside. Routine vitals, labs, and imaging evaluated. Consultant recommendations reviewed. Assessment and plan as documented above.

## 2024-03-29 NOTE — CONSULT NOTE ADULT - NS ATTEND AMEND GEN_ALL_CORE FT
Assessment/Plan: 74 y/o F with Systolic HF (EF 38%), mod AS, HTN, T2DM, LBBB, CKD, and lymphedema presented to the ED c/o SOB, CURTIS, orthopnea, and edema for >2 weeks.  States, she has been compliant with her diuretic.  However, admits to be drinking fluids very judiciously (at least 3L/day).  Denies CP, palpitations, N/V/abdominal pain, fever, chills, recent travel or sick contact.     recurrent hfref  torsemide dosing had been more frequent prior to the last admission, now once daily  fluid intake has been high  iv lasix  fluid restrict  echo  no sxs acute ischemia  labs remain pending

## 2024-03-29 NOTE — H&P ADULT - NSHPREVIEWOFSYSTEMS_GEN_ALL_CORE
CONSTITUTIONAL: denies fever, chills, fatigue, weakness, +SOB  HEENT: denies blurred vision, sore throat  SKIN: denies new lesions, rash  CARDIOVASCULAR: denies chest pain, chest pressure, palpitations  RESPIRATORY: Positive for shortness of breath, sputum production  GASTROINTESTINAL: denies nausea, vomiting, diarrhea, abdominal pain  GENITOURINARY: denies dysuria, discharge  NEUROLOGICAL: denies numbness, headache, focal weakness  MUSCULOSKELETAL: denies new joint pain, muscle aches  HEMATOLOGIC: denies gross bleeding, bruising  LYMPHATICS: bilateral lower extremity swelling   PSYCHIATRIC: denies recent changes in anxiety, depression  ENDOCRINOLOGIC: denies sweating, cold or heat intolerance

## 2024-03-29 NOTE — H&P ADULT - PROBLEM SELECTOR PLAN 3
- Pt is on Lantus 37U at bedtime at home  - Will start at lower dose Lantus 20U at bedtime with ISS and can titrate up to home dose as needed   - Monitor fingersticks

## 2024-03-29 NOTE — H&P ADULT - PROBLEM SELECTOR PLAN 2
- Nephrology consult placed given DUNCAN on CKD (unclear baseline SCr), will follow up with evaluation.     - Will monitor SCr closely while on IV Lasix, avoid Nephrotoxic medications

## 2024-03-29 NOTE — H&P ADULT - PROBLEM SELECTOR PLAN 1
- Admit to Telemetry  - Start continuous cardiac monitoring, strict I&Os, and daily weights, cardiac diet with 1.5L fluid restriction   - Will start IV Lasix 60mg BID and monitor kidney function closely.   - Seen by Cardiology as per evaluation pt is on home Torsemide 20mg daily, will hold for admission and start IV Lasix BID. Will c/w home Toprol XL 100mg daily, hold home Eplerenone 25mg daily.   - Ordered TTE, will follow up with results.  - LBBB on EKG is chronic and known, as per cardo pt had a left heart cath in 2022 which was benign, will c/w home ASA and statin on this admit.   - Will follow up with further Cardio reccs  - Follow up with routine labs in the AM

## 2024-03-29 NOTE — ED PROVIDER NOTE - PROGRESS NOTE DETAILS
Patient seen by cardiology team, spoke with Dr. Lawrence, recommending IV diuresis and admission to medicine.  Spoke with hospitalist for admission.  Patient with elevated proBNP.  Gunner Aparicio MD.

## 2024-03-29 NOTE — ED PROVIDER NOTE - CLINICAL SUMMARY MEDICAL DECISION MAKING FREE TEXT BOX
Concern for volume overload state in the setting of her heart failure.  She was initially placed on nasal cannula here though I took it off and she has been maintaining her O2 saturation on room air.  Without chest pain, less likely ACS though given her exertional symptoms will check troponin.  No fever or cough to suggest pneumonia.  No chest pain or tachycardia to suggest PE.  Cardiology was consulted, spoke with Dr. Lawrence, will give IV Lasix, plan for admission. koffi hutchison

## 2024-03-29 NOTE — H&P ADULT - NSHPPHYSICALEXAM_GEN_ALL_CORE
T(C): 36.5 (03-29-24 @ 21:15), Max: 36.5 (03-29-24 @ 21:15)  HR: 54 (03-29-24 @ 21:15) (54 - 60)  BP: 133/68 (03-29-24 @ 21:15) (133/68 - 136/66)  RR: 18 (03-29-24 @ 21:15) (18 - 18)  SpO2: 97% (03-29-24 @ 21:15) (97% - 99%)    CONSTITUTIONAL: Well groomed, no apparent distress  EYES: PERRLA and symmetric, EOMI, No conjunctival or scleral injection, non-icteric  ENMT: Oral mucosa with moist membranes. Normal dentition; no pharyngeal injection or exudates  NECK: Supple, symmetric and without tracheal deviation   RESP: MIld diffuse diminished breath sounds, no use of accessory muscles  CV: RRR, +S1S2, no MRG  GI: Soft, NT, ND, no rebound, no guarding; no palpable masses; no hepatosplenomegaly; no hernia palpated  LYMPH: No cervical LAD; no axillary LAD; b/l LE lymphadenopathy   MSK:  No digital clubbing or cyanosis  SKIN: No rashes or ulcers noted; no subcutaneous nodules or induration palpable  NEURO: normal reflexes in upper and lower extremities, sensation intact in upper and lower extremities b/l to light touch   PSYCH: Appropriate insight/judgment; A+O x 3, mood and affect appropriate,

## 2024-03-29 NOTE — CONSULT NOTE ADULT - ASSESSMENT
Assessment/Plan: 74 y/o F with Systolic HF (EF 38%), mod AS, HTN, T2DM, LBBB, CKD, and lymphedema presented to the ED c/o SOB, CURTIS, orthopnea, and edema for >2 weeks.  States, she has been compliant with her diuretic.  However, admits to be drinking fluids very judiciously (at least 3L/day).  Denies CP, palpitations, N/V/abdominal pain, fever, chills, recent travel or sick contact.     Acute on chronic Systolic HF  - Has known systolic HF, EF 38% (2023).  Follows with Dr. Luong  - On home Torsemide 20 mg daily, Eplerenone 25 mg daily, and Toprol  mg daily  - Compliant with all meds including Torsemide, though, it appears that she has been drinking fluids judiciously  - Hold home Torsemide and start Lasix IV 60 mg q12H  - Continue home Toprol XL  - Hold home Eplerenone for now  - Has known CKD.  Monitor renal function closely.  Avoid nephrotoxics.  Recommend Renal eval  - Repeat TTE  - Fluid restriction of 1.5L/day  - Strict I/O's and daily weights.  Patient has known lymphedema    - Has a known LBBB  - Had LHC in 7/2022 showing non-obstructive CAD  - Continue home ASA and statin  - No anginal complaints    - BP stable and controlled  - Routine hemodynamics    - Monitor electrolytes, replete to keep K>4 and Mag>2.  All labs, EKG, and imagings pending  - Will follow closely    Kira Zuniga DNP, NP-C, AGACNP-C  Cardiology  Call TEAMS     Assessment/Plan: 72 y/o F with Systolic HF (EF 38%), mod AS, HTN, T2DM, LBBB, CKD, and lymphedema presented to the ED c/o SOB, CURTIS, orthopnea, and edema for >2 weeks.  States, she has been compliant with her diuretic.  However, admits to be drinking fluids very judiciously (at least 3L/day).  Denies CP, palpitations, N/V/abdominal pain, fever, chills, recent travel or sick contact.     Acute on chronic Systolic HF  - Has known systolic HF, EF 38% (2023), mod AS.  Follows with Dr. Luong  - On home Torsemide 20 mg daily, Eplerenone 25 mg daily, and Toprol  mg daily  - Compliant with all meds including Torsemide, though, it appears that she has been drinking fluids judiciously  - Hold home Torsemide and start Lasix IV 60 mg q12H  - Continue home Toprol XL  - Hold home Eplerenone for now  - Has known CKD.  Monitor renal function closely.  Avoid nephrotoxics.  Recommend Renal eval  - Repeat TTE  - Fluid restriction of 1.5L/day  - Strict I/O's and daily weights.  Patient has known lymphedema    - Has a known LBBB  - Had LHC in 7/2022 showing non-obstructive CAD  - Continue home ASA and statin  - No anginal complaints    - BP stable and controlled  - Routine hemodynamics    - Monitor electrolytes, replete to keep K>4 and Mag>2.  All labs, EKG, and imagings pending  - Will follow closely    Kira Zuniga DNP, NP-C, AGACNP-C  Cardiology  Call TEAMS     Assessment/Plan: 72 y/o F with Systolic HF (EF 38%), mod AS, HTN, T2DM, LBBB, CKD, and lymphedema presented to the ED c/o SOB, CURTIS, orthopnea, and edema for >2 weeks.  States, she has been compliant with her diuretic.  However, admits to be drinking fluids very judiciously (at least 3L/day).  Denies CP, palpitations, N/V/abdominal pain, fever, chills, recent travel or sick contact.     Acute on chronic Systolic HF  - Has known systolic HF, EF 38% (2023), mod AS.  Follows with Dr. Luong  - On home Torsemide 20 mg daily, Eplerenone 25 mg daily, and Toprol  mg daily  - Compliant with all meds including Torsemide, though, it appears that she has been drinking fluids excessively  - Hold home Torsemide and start Lasix IV 60 mg q12H  - Continue home Toprol XL  - Hold home Eplerenone for now  - Has known CKD.  Monitor renal function closely.  Avoid nephrotoxics.  Recommend Renal eval  - Repeat TTE  - Fluid restriction of 1.5L/day  - Strict I/O's and daily weights.  Patient has known lymphedema    - Has a known LBBB  - Had LHC in 7/2022 showing non-obstructive CAD  - Continue home ASA and statin  - No anginal complaints    - BP stable and controlled  - Routine hemodynamics    - Monitor electrolytes, replete to keep K>4 and Mag>2.  All labs, EKG, and imagings pending  - Will follow closely    Kira Zuniga DNP, NP-C, AGACNP-C  Cardiology  Call TEAMS

## 2024-03-29 NOTE — ED PROVIDER NOTE - OBJECTIVE STATEMENT
Patient with a past medical history of heart failure, hypertension, diabetes, chronic kidney disease and chronic lymphedema is presenting with concern for shortness of breath.  Over the past few weeks she has had worsening shortness of breath on exertion, orthopnea, and lower extremity edema.  Denies any chest pain with this.  No fevers or cough.  Has been taking her medications regularly.  Follows with Dr. Luong.

## 2024-03-29 NOTE — H&P ADULT - ASSESSMENT
73F with a PMHx of Chronic Systolic CHF (last TTE with EF 38%), moderate aortic stenosis, known LBBB, HTN, DM2, CKD, hypothyroidism, and chronic lymphedema   admitted with acute on chronic systolic CHF and DUNCAN on CKD.

## 2024-03-29 NOTE — ED ADULT NURSE NOTE - OBJECTIVE STATEMENT
[facility] [place] as patient requested.    
Pt received in 6A 73yr old female AXO 4 from home c/o increased SOB with bilateral leg swelling. PMH CHF, lymphedema, heart murmur, DM2. Upon assessment pt is SOB with diminished lung sounds, pt states that she has had increased SOB upon exertion with a longer recovery time than usual. pt also states her legs started to swell 2 weeks ago and has progressively worsened(+4 edema). Pt states she is on lasix. PERRLA, no numbness and tingling in extremities, speaking in coherent sentences with no slurred speech, no facial droop noted. Denies chest pain. Abd is soft round and non tender pt is obese. pt on cardiac monitor NSR, continuous pulse ox 95% on 2L NC. Pending MD orders.

## 2024-03-29 NOTE — H&P ADULT - HISTORY OF PRESENT ILLNESS
73F with a PMHx of Chronic Systolic CHF (last TTE with EF 38%), moderate aortic stenosis, known LBBB, HTN, DM2, CKD, hypothyroidism, and chronic lymphedema presents to the ED with shortness of breath over the past two weeks which has been acutely worsening. Pt states she is compliant with her medications but has not be as compliant with monitoring her fluid intake. Currently she denies chest pain, dizziness, abdominal pain, nausea, vomiting, fever, or chills.   73F with a PMHx of Chronic Systolic CHF (last TTE with EF 38%), moderate aortic stenosis, known LBBB, HTN, DM2, CKD, hypothyroidism, and chronic lymphedema presents to the ED with shortness of breath over the past two weeks which has been acutely worsening. Pt states she is compliant with her medications but has not be as compliant with monitoring her fluid intake. Currently she denies chest pain, dizziness, abdominal pain, nausea, vomiting, diarrhea, fever, or chills. 73F with a PMHx of Chronic Systolic CHF (last TTE with EF 38%), moderate aortic stenosis, known LBBB, HTN, DM1, CKD, hypothyroidism, and chronic lymphedema presents to the ED with shortness of breath over the past two weeks which has been acutely worsening. Pt states she is compliant with her medications but has not be as compliant with monitoring her fluid intake. Currently she denies chest pain, dizziness, abdominal pain, nausea, vomiting, diarrhea, fever, or chills.

## 2024-03-29 NOTE — H&P ADULT - NSHPLABSRESULTS_GEN_ALL_CORE
03-29    141  |  103  |  54<H>  ----------------------------<  137<H>  4.3   |  29  |  2.70<H>    Ca    8.3<L>      29 Mar 2024 18:40  Mg     2.4     03-29    TPro  7.7  /  Alb  3.5  /  TBili  0.9  /  DBili  x   /  AST  15  /  ALT  13  /  AlkPhos  74  03-29                            12.0   6.64  )-----------( 181      ( 29 Mar 2024 18:40 )             38.4

## 2024-03-29 NOTE — ED PROVIDER NOTE - WR INTERPRETED BY 1
Patient declines needs refills at this time.  Requesting Reglan short term to local pharmacy Gunner Aparicio

## 2024-03-29 NOTE — CONSULT NOTE ADULT - SUBJECTIVE AND OBJECTIVE BOX
[facility] - Howard Kimble Pannella, Patel, Savella, Cohen Goodger  Office Number: [phone]    Initial Consult Note    CHIEF COMPLAINT: Patient is a 73y old  Female who presents with a chief complaint of     HPI: This is a 74 y/o F with Systolic HF (EF 38%), mod AS, HTN, T2DM, LBBB, CKD, and lymphedema presented to the ED c/o SOB and edema x 2 weeks at least.      PAST MEDICAL & SURGICAL HISTORY:  Hypertension  Diabetes  Lymphedema  H/O left bundle branch block  History of left bundle branch block (LBBB)  Systolic heart failure, chronic  H/O cataract  2020  History of surgical removal of meniscus of knee  left in 1971  Frozen shoulder  2000  H/O Achilles tendon repair  lengthened bilaterally, 2000  Fractured skull  1968    SOCIAL HISTORY:  No tobacco, ethanol, or drug abuse.  FAMILY HISTORY:  Family history of CVA  mom, age 57    No family history of acute MI or sudden cardiac death.  MEDICATIONS  (STANDING):    MEDICATIONS  (PRN):    Allergies    Ativan (Rash; Urticaria)  penicillins (Hives)    Intolerances      REVIEW OF SYSTEMS:  CONSTITUTIONAL: No weakness, fevers or chills  EYES/ENT: No visual changes;  No vertigo or throat pain   NECK: No pain or stiffness  RESPIRATORY: No cough, wheezing, hemoptysis; No shortness of breath  CARDIOVASCULAR: No chest pain or palpitations  GASTROINTESTINAL: No abdominal pain. No nausea, vomiting, or hematemesis; No diarrhea or constipation. No melena or hematochezia.  GENITOURINARY: No dysuria, frequency or hematuria  NEUROLOGICAL: No numbness or weakness  SKIN: No itching or rash  All other review of systems is negative unless indicated above  VITAL SIGNS:   Vital Signs Last 24 Hrs  T(C): 36.3 (29 Mar 2024 15:07), Max: 36.3 (29 Mar 2024 15:07)  T(F): 97.4 (29 Mar 2024 15:07), Max: 97.4 (29 Mar 2024 15:07)  HR: 60 (29 Mar 2024 15:07) (60 - 60)  BP: 136/66 (29 Mar 2024 15:07) (136/66 - 136/66)  BP(mean): --  RR: 18 (29 Mar 2024 15:07) (18 - 18)  SpO2: 99% (29 Mar 2024 15:07) (99% - 99%)    Parameters below as of 29 Mar 2024 15:07  Patient On (Oxygen Delivery Method): room air      I&O's Summary    On Exam:  Constitutional: NAD, alert and oriented x 3  Lungs:  Non-labored, breath sounds are clear bilaterally, No wheezing, rales or rhonchi  Cardiovascular: RRR.  S1 and S2 positive.  No murmurs, rubs, gallops or clicks  Gastrointestinal: Bowel Sounds present, soft, nontender.   Lymph: No peripheral edema. No cervical lymphadenopathy.  Neurological: Alert, no focal deficits  Skin: No rashes or ulcers   Psych:  Mood & affect appropriate.    LABS: All Labs Reviewed:  RADIOLOGY:    TRANSTHORACIC ECHOCARDIOGRAM REPORT  ________________________________________________________________________________                                      ______     Pt. Name:       EVELYN LOPEZ Study Date:    6/6/2023  MRN:            [id number]         YOB: 1950  Accession #:    [id number]        Age:           72 years  Account#:       [id number]       Gender:        F  Heart Rate:                      Height:        70.87 in (180.00 cm)  Rhythm:                          Weight:        ( )  Blood Pressure: 119/57 mmHg      BSA/BMI:       /  ________________________________________________________________________________________  Referring Physician:    [id number] Luis Mcdermott  Interpreting Physician: Skylar Hernández MD  Primary Sonographer:    AS    CPT:               ECHO TTE WO CON COMP W DOPP - 36965.m  Indication(s):     Cardiac murmur, unspecified - R01.1  Procedure:         Transthoracic echocardiogram with 2-D, M-mode and complete                     spectral and color flow Doppler.  Ordering Location: ICU1  _______________________________________________________________________________________  CONCLUSIONS:      1. The left ventricular systolic function is mildly decreased with an ejection fraction of 38 % by Nunes's method of disks.   2. Normal right ventricular cavity size and probably normal systolic function.   3. The left atrium is mildly dilated.   4. The right atrium is normal.   5. Moderate calcification of the aortic valve leaflets.  6. Moderate aortic stenosis.   7. There is moderate calcification of the mitral valve annulus.   8. Trace mitral regurgitation.   9. Trace tricuspid regurgitation.  10. Pulmonic valve was not well visualized.  11. The inferior vena cava is normal measuring 1.86 cm in diameter, (normal <2.1cm) with normal inspiratory collapse (normal >50%) consistent with normal right atrial pressure (~3, range 0-5mmHg).  _______________________________________________________________________________________  FINDINGS:     Left Ventricle:  The left ventricular systolic function is mildly decreased with a calculated ejection fraction of 38 % by the Nunes's biplane method of disks.     Right Ventricle:  Normal right ventricular cavity size and probably normal systolic function. Tricuspid annular plane systolic excursion (TAPSE) is 2.7 cm (normal >=1.7 cm).     Left Atrium:  The left atrium is mildly dilated.     Right Atrium:  The right atrium is normal.     Aortic Valve:  There is moderate calcification of the aortic valve leaflets. There is moderate aortic stenosis. The peak transaortic velocity is 3.80 m/s, peak transaortic gradient is 57.8 mmHg and mean transaortic gradient is 31.0 mmHg with an LVOT/aortic valve VTI ratio of 0.23. The aortic valve area is estimated at 0.64 cm² by the continuity equation.     Mitral Valve:  There is mitral valve thickening of the anterior and posterior leaflets. There is moderate calcification of the mitral valve annulus. There is trace mitral regurgitation.     Tricuspid Valve:  There is trace tricuspid regurgitation.     Pulmonic Valve:  The pulmonic valve was not well visualized.     Systemic Veins:  The inferior vena cava is normal measuring 1.86 cm in diameter, (normal <2.1cm) with normal inspiratorycollapse (normal >50%) consistent with normal right atrial pressure (~3, range 0-5mmHg).  ____________________________________________________________________  QUANTITATIVE DATA  Left Ventricle Measurements                         Indexed BSA  IVSd (2D):   1.1 cm  LVPWd (2D):  1.3 cm  LVIDd (2D):  5.2 cm  LVIDs (2D):  4.3 cm  LV Mass:     247 g  LV Vol d, MOD A2C: 111.0 ml  LV Vol d, MOD A4C: 112.0 ml  LV Vol d, MOD BP:  116.7 ml  LV Vol s, MOD A2C: 70.9 ml  LV Vol s, MOD A4C: 67.8 ml  LV Vol s, MOD BP:  72.9 ml  LVOT SV MOD BP:    43.8 ml  LV EF% MOD BP:     38 %     MV E Vmax:    0.89 m/s  MV A Vmax:    1.22 m/s  MV E/A:       0.73  e' lateral:   12.60 cm/s  e' medial:    11.60 cm/s  E/e' lateral: 7.10  E/e' medial:  7.71  E/e' Average: 7.39  MV DT:        207 msec    Left Atrium Measurements     LA Diam 2D: 4.20 cm    Right Ventricle Measurements     TAPSE:            2.7 cm  TV Preeti. S':       15.50 cm/s  RV Base (RVID1):  3.5 cm  RV Mid (RVID2):   3.1 cm  RV Major (RVID3): 8.3 cm       LVOT / RVOT/ Qp/Qs Data:  LVOT Diameter: 1.90 cm  LVOT Vmax:     0.83 m/s  LVOT VTI:      17.50 cm  LVOT SV:       49.6 ml    Aortic Valve Measurements     AV Vmax:          380.0 cm/s  AV Peak Gradient: 57.8 mmHg  AV Mean Gradient: 31.0 mmHg  AVVTI:           77.6 cm  AV VTI Ratio:     0.23  AoV EOA, Contin:  0.64 cm²    Mitral Valve Measurements     MV E Vmax: 0.9 m/s  MV A Vmax: 1.2 m/s  MV E/A:    0.7    Tricuspid Valve Measurements     RA Pressure: 3 mmHg  ________________________________________________________________________________________  Diagnosing Physician: Skylar Hernández MD.  Electronically signed on 6/7/2023 at 6:14:52 PM by Skylar Hernández MD     *** Final ***  EKG:    Assessment/Plan: 74 y/o F with Systolic HF (EF 38%), mod AS, HTN, T2DM, LBBB, CKD, and lymphedema presented to the ED c/o SOB and edema x 2 weeks at least.       Acute on chronic Systolic HF  - Has known systolic HF, EF 38% (2023)  - On home Torsemide 20 mg daily, Eplerenone 25 mg daily, and Toprol  mg daily  - Hold home Torsemide and start  Lasix IV  - Has known CKD.  Monitor renal function closely.  Avoid nephrotoxics  - Repeat TTE  - Strict I/O's and daily weights.  Patient has known lymphedema    -     Kira Zuniga DNP, NP-C, AGACNP-C  Cardiology  Call TEAMS       [facility] - Howard Kimble Pannella, Patel, Savella, Cohen Goodger  Office Number: [phone]    Initial Consult Note    CHIEF COMPLAINT: Patient is a 73y old  Female who presents with a chief complaint of     HPI: This is a 72 y/o F with Systolic HF (EF 38%), mod AS, HTN, T2DM, LBBB, CKD, and lymphedema presented to the ED c/o SOB and edema x 2 weeks at least.      PAST MEDICAL & SURGICAL HISTORY:  Hypertension  Diabetes  Lymphedema  H/O left bundle branch block  History of left bundle branch block (LBBB)  Systolic heart failure, chronic  H/O cataract  2020  History of surgical removal of meniscus of knee  left in 1971  Frozen shoulder  2000  H/O Achilles tendon repair  lengthened bilaterally, 2000  Fractured skull  1968    SOCIAL HISTORY:  No tobacco, ethanol, or drug abuse.  FAMILY HISTORY:  Family history of CVA  mom, age 57    No family history of acute MI or sudden cardiac death.  MEDICATIONS  (STANDING):    MEDICATIONS  (PRN):    Allergies    Ativan (Rash; Urticaria)  penicillins (Hives)    Intolerances      REVIEW OF SYSTEMS:  CONSTITUTIONAL: No weakness, fevers or chills  EYES/ENT: No visual changes;  No vertigo or throat pain   NECK: No pain or stiffness  RESPIRATORY: No cough, wheezing, hemoptysis; No shortness of breath  CARDIOVASCULAR: No chest pain or palpitations  GASTROINTESTINAL: No abdominal pain. No nausea, vomiting, or hematemesis; No diarrhea or constipation. No melena or hematochezia.  GENITOURINARY: No dysuria, frequency or hematuria  NEUROLOGICAL: No numbness or weakness  SKIN: No itching or rash  All other review of systems is negative unless indicated above  VITAL SIGNS:   Vital Signs Last 24 Hrs  T(C): 36.3 (29 Mar 2024 15:07), Max: 36.3 (29 Mar 2024 15:07)  T(F): 97.4 (29 Mar 2024 15:07), Max: 97.4 (29 Mar 2024 15:07)  HR: 60 (29 Mar 2024 15:07) (60 - 60)  BP: 136/66 (29 Mar 2024 15:07) (136/66 - 136/66)  BP(mean): --  RR: 18 (29 Mar 2024 15:07) (18 - 18)  SpO2: 99% (29 Mar 2024 15:07) (99% - 99%)    Parameters below as of 29 Mar 2024 15:07  Patient On (Oxygen Delivery Method): room air      I&O's Summary    On Exam:  Constitutional: NAD, alert and oriented x 3  Lungs:  Non-labored, breath sounds are clear bilaterally, No wheezing, rales or rhonchi  Cardiovascular: RRR.  S1 and S2 positive.  No murmurs, rubs, gallops or clicks  Gastrointestinal: Bowel Sounds present, soft, nontender.   Lymph: No peripheral edema. No cervical lymphadenopathy.  Neurological: Alert, no focal deficits  Skin: No rashes or ulcers   Psych:  Mood & affect appropriate.    LABS: All Labs Reviewed:  RADIOLOGY:    TRANSTHORACIC ECHOCARDIOGRAM REPORT  ________________________________________________________________________________                                      ______     Pt. Name:       EVELYN LOPEZ Study Date:    6/6/2023  MRN:            [id number]         YOB: 1950  Accession #:    [id number]        Age:           72 years  Account#:       [id number]       Gender:        F  Heart Rate:                      Height:        70.87 in (180.00 cm)  Rhythm:                          Weight:        ( )  Blood Pressure: 119/57 mmHg      BSA/BMI:       /  ________________________________________________________________________________________  Referring Physician:    [id number] Luis Mcdermott  Interpreting Physician: Skylar Hernández MD  Primary Sonographer:    AS    CPT:               ECHO TTE WO CON COMP W DOPP - 02125.m  Indication(s):     Cardiac murmur, unspecified - R01.1  Procedure:         Transthoracic echocardiogram with 2-D, M-mode and complete                     spectral and color flow Doppler.  Ordering Location: ICU1  _______________________________________________________________________________________  CONCLUSIONS:      1. The left ventricular systolic function is mildly decreased with an ejection fraction of 38 % by Nunes's method of disks.   2. Normal right ventricular cavity size and probably normal systolic function.   3. The left atrium is mildly dilated.   4. The right atrium is normal.   5. Moderate calcification of the aortic valve leaflets.  6. Moderate aortic stenosis.   7. There is moderate calcification of the mitral valve annulus.   8. Trace mitral regurgitation.   9. Trace tricuspid regurgitation.  10. Pulmonic valve was not well visualized.  11. The inferior vena cava is normal measuring 1.86 cm in diameter, (normal <2.1cm) with normal inspiratory collapse (normal >50%) consistent with normal right atrial pressure (~3, range 0-5mmHg).  _______________________________________________________________________________________  FINDINGS:     Left Ventricle:  The left ventricular systolic function is mildly decreased with a calculated ejection fraction of 38 % by the Nunes's biplane method of disks.     Right Ventricle:  Normal right ventricular cavity size and probably normal systolic function. Tricuspid annular plane systolic excursion (TAPSE) is 2.7 cm (normal >=1.7 cm).     Left Atrium:  The left atrium is mildly dilated.     Right Atrium:  The right atrium is normal.     Aortic Valve:  There is moderate calcification of the aortic valve leaflets. There is moderate aortic stenosis. The peak transaortic velocity is 3.80 m/s, peak transaortic gradient is 57.8 mmHg and mean transaortic gradient is 31.0 mmHg with an LVOT/aortic valve VTI ratio of 0.23. The aortic valve area is estimated at 0.64 cm² by the continuity equation.     Mitral Valve:  There is mitral valve thickening of the anterior and posterior leaflets. There is moderate calcification of the mitral valve annulus. There is trace mitral regurgitation.     Tricuspid Valve:  There is trace tricuspid regurgitation.     Pulmonic Valve:  The pulmonic valve was not well visualized.     Systemic Veins:  The inferior vena cava is normal measuring 1.86 cm in diameter, (normal <2.1cm) with normal inspiratorycollapse (normal >50%) consistent with normal right atrial pressure (~3, range 0-5mmHg).  ____________________________________________________________________  QUANTITATIVE DATA  Left Ventricle Measurements                         Indexed BSA  IVSd (2D):   1.1 cm  LVPWd (2D):  1.3 cm  LVIDd (2D):  5.2 cm  LVIDs (2D):  4.3 cm  LV Mass:     247 g  LV Vol d, MOD A2C: 111.0 ml  LV Vol d, MOD A4C: 112.0 ml  LV Vol d, MOD BP:  116.7 ml  LV Vol s, MOD A2C: 70.9 ml  LV Vol s, MOD A4C: 67.8 ml  LV Vol s, MOD BP:  72.9 ml  LVOT SV MOD BP:    43.8 ml  LV EF% MOD BP:     38 %     MV E Vmax:    0.89 m/s  MV A Vmax:    1.22 m/s  MV E/A:       0.73  e' lateral:   12.60 cm/s  e' medial:    11.60 cm/s  E/e' lateral: 7.10  E/e' medial:  7.71  E/e' Average: 7.39  MV DT:        207 msec    Left Atrium Measurements     LA Diam 2D: 4.20 cm    Right Ventricle Measurements     TAPSE:            2.7 cm  TV Preeti. S':       15.50 cm/s  RV Base (RVID1):  3.5 cm  RV Mid (RVID2):   3.1 cm  RV Major (RVID3): 8.3 cm       LVOT / RVOT/ Qp/Qs Data:  LVOT Diameter: 1.90 cm  LVOT Vmax:     0.83 m/s  LVOT VTI:      17.50 cm  LVOT SV:       49.6 ml    Aortic Valve Measurements     AV Vmax:          380.0 cm/s  AV Peak Gradient: 57.8 mmHg  AV Mean Gradient: 31.0 mmHg  AVVTI:           77.6 cm  AV VTI Ratio:     0.23  AoV EOA, Contin:  0.64 cm²    Mitral Valve Measurements     MV E Vmax: 0.9 m/s  MV A Vmax: 1.2 m/s  MV E/A:    0.7    Tricuspid Valve Measurements     RA Pressure: 3 mmHg  ________________________________________________________________________________________  Diagnosing Physician: Skylar Hernández MD.  Electronically signed on 6/7/2023 at 6:14:52 PM by Skylar Hernández MD     *** Final ***    c  EKG:    INCOMPLETE  Assessment/Plan: 72 y/o F with Systolic HF (EF 38%), mod AS, HTN, T2DM, LBBB, CKD, and lymphedema presented to the ED c/o SOB and edema x 2 weeks at least.       Acute on chronic Systolic HF  - Has known systolic HF, EF 38% (2023)  - On home Torsemide 20 mg daily, Eplerenone 25 mg daily, and Toprol  mg daily  - Hold home Torsemide and start  Lasix IV  - Has known CKD.  Monitor renal function closely.  Avoid nephrotoxics  - Repeat TTE  - Strict I/O's and daily weights.  Patient has known lymphedema    - Has a known LBBB  - Had LHC in 7/2022 showing non-obstructive CAD  - Continue home ASA and statin    Kira [place] DNP, NP-C, AGACNP-C  Cardiology  Call TEAMS       [facility] - Howard Kimble Pannella, Patel, Savella, Cohen Nunoton  Office Number: [phone]    Initial Consult Note    CHIEF COMPLAINT: Patient is a 73y old  Female who presents with a chief complaint of     HPI: This is a 72 y/o F with Systolic HF (EF 38%), mod AS, HTN, T2DM, LBBB, CKD, and lymphedema presented to the ED c/o SOB, CURTIS, orthopnea, and edema for >2 weeks.  States, she has been compliant with her diuretic.  However, admits to be drinking fluids very judiciously (at least 3L/day).  Denies CP, palpitations, N/V/abdominal pain, fever, chills, recent travel or sick contact.     PAST MEDICAL & SURGICAL HISTORY:  Hypertension  Diabetes  Lymphedema  H/O left bundle branch block  History of left bundle branch block (LBBB)  Systolic heart failure, chronic  H/O cataract  2020  History of surgical removal of meniscus of knee  left in 1971  Frozen shoulder  2000  H/O Achilles tendon repair  lengthened bilaterally, 2000  Fractured skull  1968    SOCIAL HISTORY:  No tobacco, ethanol, or drug abuse.  FAMILY HISTORY:  Family history of CVA  mom, age 57    No family history of acute MI or sudden cardiac death.  MEDICATIONS  (STANDING):    MEDICATIONS  (PRN):    Allergies    Ativan (Rash; Urticaria)  penicillins (Hives)    Intolerances      REVIEW OF SYSTEMS:  CONSTITUTIONAL: No weakness, fevers or chills  EYES/ENT: No visual changes;  No vertigo or throat pain   NECK: No pain or stiffness  RESPIRATORY: No cough, wheezing, hemoptysis; No shortness of breath  CARDIOVASCULAR: No chest pain or palpitations  GASTROINTESTINAL: No abdominal pain. No nausea, vomiting, or hematemesis; No diarrhea or constipation. No melena or hematochezia.  GENITOURINARY: No dysuria, frequency or hematuria  NEUROLOGICAL: No numbness or weakness  SKIN: No itching or rash  All other review of systems is negative unless indicated above  VITAL SIGNS:   Vital Signs Last 24 Hrs  T(C): 36.3 (29 Mar 2024 15:07), Max: 36.3 (29 Mar 2024 15:07)  T(F): 97.4 (29 Mar 2024 15:07), Max: 97.4 (29 Mar 2024 15:07)  HR: 60 (29 Mar 2024 15:07) (60 - 60)  BP: 136/66 (29 Mar 2024 15:07) (136/66 - 136/66)  BP(mean): --  RR: 18 (29 Mar 2024 15:07) (18 - 18)  SpO2: 99% (29 Mar 2024 15:07) (99% - 99%)    Parameters below as of 29 Mar 2024 15:07  Patient On (Oxygen Delivery Method): room air      I&O's Summary    On Exam:  Constitutional: NAD, alert and oriented x 3  Lungs:  Non-labored, breath sounds are clear bilaterally, No wheezing, rales or rhonchi  Cardiovascular: RRR.  S1 and S2 positive.  No murmurs, rubs, gallops or clicks  Gastrointestinal: Bowel Sounds present, soft, nontender.   Lymph: No peripheral edema. No cervical lymphadenopathy.  Neurological: Alert, no focal deficits  Skin: No rashes or ulcers   Psych:  Mood & affect appropriate.    LABS: All Labs Reviewed:  RADIOLOGY:    TRANSTHORACIC ECHOCARDIOGRAM REPORT  ________________________________________________________________________________                                      ______     Pt. Name:       EVELYN LOPEZ Study Date:    6/6/2023  MRN:            [id number]         YOB: 1950  Accession #:    [id number]        Age:           72 years  Account#:       [id number]       Gender:        F  Heart Rate:                      Height:        70.87 in (180.00 cm)  Rhythm:                          Weight:        ( )  Blood Pressure: 119/57 mmHg      BSA/BMI:       /  ________________________________________________________________________________________  Referring Physician:    [id number] Luis Mcdermott  Interpreting Physician: Skylar Hernández MD  Primary Sonographer:    AS    CPT:               ECHO TTE WO CON COMP W DOPP - 72784.m  Indication(s):     Cardiac murmur, unspecified - R01.1  Procedure:         Transthoracic echocardiogram with 2-D, M-mode and complete                     spectral and color flow Doppler.  Ordering Location: ICU1  _______________________________________________________________________________________  CONCLUSIONS:      1. The left ventricular systolic function is mildly decreased with an ejection fraction of 38 % by Nunes's method of disks.   2. Normal right ventricular cavity size and probably normal systolic function.   3. The left atrium is mildly dilated.   4. The right atrium is normal.   5. Moderate calcification of the aortic valve leaflets.  6. Moderate aortic stenosis.   7. There is moderate calcification of the mitral valve annulus.   8. Trace mitral regurgitation.   9. Trace tricuspid regurgitation.  10. Pulmonic valve was not well visualized.  11. The inferior vena cava is normal measuring 1.86 cm in diameter, (normal <2.1cm) with normal inspiratory collapse (normal >50%) consistent with normal right atrial pressure (~3, range 0-5mmHg).  _______________________________________________________________________________________  FINDINGS:     Left Ventricle:  The left ventricular systolic function is mildly decreased with a calculated ejection fraction of 38 % by the Nunes's biplane method of disks.     Right Ventricle:  Normal right ventricular cavity size and probably normal systolic function. Tricuspid annular plane systolic excursion (TAPSE) is 2.7 cm (normal >=1.7 cm).     Left Atrium:  The left atrium is mildly dilated.     Right Atrium:  The right atrium is normal.     Aortic Valve:  There is moderate calcification of the aortic valve leaflets. There is moderate aortic stenosis. The peak transaortic velocity is 3.80 m/s, peak transaortic gradient is 57.8 mmHg and mean transaortic gradient is 31.0 mmHg with an LVOT/aortic valve VTI ratio of 0.23. The aortic valve area is estimated at 0.64 cm² by the continuity equation.     Mitral Valve:  There is mitral valve thickening of the anterior and posterior leaflets. There is moderate calcification of the mitral valve annulus. There is trace mitral regurgitation.     Tricuspid Valve:  There is trace tricuspid regurgitation.     Pulmonic Valve:  The pulmonic valve was not well visualized.     Systemic Veins:  The inferior vena cava is normal measuring 1.86 cm in diameter, (normal <2.1cm) with normal inspiratorycollapse (normal >50%) consistent with normal right atrial pressure (~3, range 0-5mmHg).  ____________________________________________________________________  QUANTITATIVE DATA  Left Ventricle Measurements                         Indexed BSA  IVSd (2D):   1.1 cm  LVPWd (2D):  1.3 cm  LVIDd (2D):  5.2 cm  LVIDs (2D):  4.3 cm  LV Mass:     247 g  LV Vol d, MOD A2C: 111.0 ml  LV Vol d, MOD A4C: 112.0 ml  LV Vol d, MOD BP:  116.7 ml  LV Vol s, MOD A2C: 70.9 ml  LV Vol s, MOD A4C: 67.8 ml  LV Vol s, MOD BP:  72.9 ml  LVOT SV MOD BP:    43.8 ml  LV EF% MOD BP:     38 %     MV E Vmax:    0.89 m/s  MV A Vmax:    1.22 m/s  MV E/A:       0.73  e' lateral:   12.60 cm/s  e' medial:    11.60 cm/s  E/e' lateral: 7.10  E/e' medial:  7.71  E/e' Average: 7.39  MV DT:        207 msec    Left Atrium Measurements     LA Diam 2D: 4.20 cm    Right Ventricle Measurements     TAPSE:            2.7 cm  TV Preeti. S':       15.50 cm/s  RV Base (RVID1):  3.5 cm  RV Mid (RVID2):   3.1 cm  RV Major (RVID3): 8.3 cm       LVOT / RVOT/ Qp/Qs Data:  LVOT Diameter: 1.90 cm  LVOT Vmax:     0.83 m/s  LVOT VTI:      17.50 cm  LVOT SV:       49.6 ml    Aortic Valve Measurements     AV Vmax:          380.0 cm/s  AV Peak Gradient: 57.8 mmHg  AV Mean Gradient: 31.0 mmHg  AVVTI:           77.6 cm  AV VTI Ratio:     0.23  AoV EOA, Contin:  0.64 cm²    Mitral Valve Measurements     MV E Vmax: 0.9 m/s  MV A Vmax: 1.2 m/s  MV E/A:    0.7    Tricuspid Valve Measurements     RA Pressure: 3 mmHg  ________________________________________________________________________________________  Diagnosing Physician: Skylar Hernández MD.  Electronically signed on 6/7/2023 at 6:14:52 PM by Skylar Hernández MD     *** Final ***    CxR: Pending  EKG: Pending  Labs: Pending     [facility] - Howard Kimble Pannella, Patel, Savella, Cohen Edgar  Office Number: [phone]    Initial Consult Note    CHIEF COMPLAINT: Patient is a 73y old  Female who presents with a chief complaint of     HPI: This is a 72 y/o F with Systolic HF (EF 38%), mod AS, HTN, T2DM, LBBB, CKD, and lymphedema presented to the ED c/o SOB, CURTIS, orthopnea, and edema for >2 weeks.  States, she has been compliant with her diuretic.  However, admits to be drinking fluids very judiciously (at least 3L/day).  Denies CP, palpitations, N/V/abdominal pain, fever, chills, recent travel or sick contact.     PAST MEDICAL & SURGICAL HISTORY:  Hypertension  Diabetes  Lymphedema  H/O left bundle branch block  History of left bundle branch block (LBBB)  Systolic heart failure, chronic  H/O cataract  2020  History of surgical removal of meniscus of knee  left in 1971  Frozen shoulder  2000  H/O Achilles tendon repair  lengthened bilaterally, 2000  Fractured skull  1968    SOCIAL HISTORY:  No tobacco, ethanol, or drug abuse.  FAMILY HISTORY:  Family history of CVA  mom, age 57    No family history of acute MI or sudden cardiac death.  MEDICATIONS  (STANDING):    MEDICATIONS  (PRN):    Allergies    Ativan (Rash; Urticaria)  penicillins (Hives)    Intolerances    REVIEW OF SYSTEMS:  CONSTITUTIONAL: No weakness, fevers or chills  EYES/ENT: No visual changes;  No vertigo or throat pain   NECK: No pain or stiffness  RESPIRATORY: No cough, wheezing, hemoptysis; +shortness of breath +CURTIS, +orthopnea  CARDIOVASCULAR: No chest pain or palpitations  +worsening edema  GASTROINTESTINAL: No abdominal pain. No nausea, vomiting, or hematemesis; No diarrhea or constipation. No melena or hematochezia.  GENITOURINARY: No dysuria, frequency or hematuria  NEUROLOGICAL: No numbness or weakness  SKIN: No itching +chronic rash from eczema  All other review of systems is negative unless indicated above  VITAL SIGNS:   Vital Signs Last 24 Hrs  T(C): 36.3 (29 Mar 2024 15:07), Max: 36.3 (29 Mar 2024 15:07)  T(F): 97.4 (29 Mar 2024 15:07), Max: 97.4 (29 Mar 2024 15:07)  HR: 60 (29 Mar 2024 15:07) (60 - 60)  BP: 136/66 (29 Mar 2024 15:07) (136/66 - 136/66)  BP(mean): --  RR: 18 (29 Mar 2024 15:07) (18 - 18)  SpO2: 99% (29 Mar 2024 15:07) (99% - 99%)    Parameters below as of 29 Mar 2024 15:07  Patient On (Oxygen Delivery Method): room air      I&O's Summary    On Exam:  Constitutional: NAD, alert and oriented x 3  Lungs:  Non-labored, breath sounds are diminished bilaterally, No wheezing, rales or rhonchi  Cardiovascular: RRR.  S1 and S2 positive.  + murmurs, no rubs, gallops or clicks  Gastrointestinal: Bowel Sounds present, soft, nontender.   Lymph: 3-4+ BLE edema. No cervical lymphadenopathy.  Neurological: Alert, no focal deficits  Skin: No ulcers +eczema  Psych:  Mood & affect appropriate.    LABS: All Labs Reviewed:  RADIOLOGY:    TRANSTHORACIC ECHOCARDIOGRAM REPORT  ________________________________________________________________________________                                      ______     Pt. Name:       EVELYN LOPEZ Study Date:    6/6/2023  MRN:            [id number]         YOB: 1950  Accession #:    [id number]        Age:           72 years  Account#:       [id number]       Gender:        F  Heart Rate:                      Height:        70.87 in (180.00 cm)  Rhythm:                          Weight:        ( )  Blood Pressure: 119/57 mmHg      BSA/BMI:       /  ________________________________________________________________________________________  Referring Physician:    [id number] Luis Mcdermott  Interpreting Physician: Skylar Hernández MD  Primary Sonographer:    AS    CPT:               ECHO TTE WO CON COMP W DOPP - 93967.m  Indication(s):     Cardiac murmur, unspecified - R01.1  Procedure:         Transthoracic echocardiogram with 2-D, M-mode and complete                     spectral and color flow Doppler.  Ordering Location: [facility]  _______________________________________________________________________________________  CONCLUSIONS:      1. The left ventricular systolic function is mildly decreased with an ejection fraction of 38 % by Nunes's method of disks.   2. Normal right ventricular cavity size and probably normal systolic function.   3. The left atrium is mildly dilated.   4. The right atrium is normal.   5. Moderate calcification of the aortic valve leaflets.  6. Moderate aortic stenosis.   7. There is moderate calcification of the mitral valve annulus.   8. Trace mitral regurgitation.   9. Trace tricuspid regurgitation.  10. Pulmonic valve was not well visualized.  11. The inferior vena cava is normal measuring 1.86 cm in diameter, (normal <2.1cm) with normal inspiratory collapse (normal >50%) consistent with normal right atrial pressure (~3, range 0-5mmHg).  _______________________________________________________________________________________  FINDINGS:     Left Ventricle:  The left ventricular systolic function is mildly decreased with a calculated ejection fraction of 38 % by the Nunes's biplane method of disks.     Right Ventricle:  Normal right ventricular cavity size and probably normal systolic function. Tricuspid annular plane systolic excursion (TAPSE) is 2.7 cm (normal >=1.7 cm).     Left Atrium:  The left atrium is mildly dilated.     Right Atrium:  The right atrium is normal.     Aortic Valve:  There is moderate calcification of the aortic valve leaflets. There is moderate aortic stenosis. The peak transaortic velocity is 3.80 m/s, peak transaortic gradient is 57.8 mmHg and mean transaortic gradient is 31.0 mmHg with an LVOT/aortic valve VTI ratio of 0.23. The aortic valve area is estimated at 0.64 cm² by the continuity equation.     Mitral Valve:  There is mitral valve thickening of the anterior and posterior leaflets. There is moderate calcification of the mitral valve annulus. There is trace mitral regurgitation.     Tricuspid Valve:  There is trace tricuspid regurgitation.     Pulmonic Valve:  The pulmonic valve was not well visualized.     Systemic Veins:  The inferior vena cava is normal measuring 1.86 cm in diameter, (normal <2.1cm) with normal inspiratorycollapse (normal >50%) consistent with normal right atrial pressure (~3, range 0-5mmHg).  ____________________________________________________________________  QUANTITATIVE DATA  Left Ventricle Measurements                         Indexed BSA  IVSd (2D):   1.1 cm  LVPWd (2D):  1.3 cm  LVIDd (2D):  5.2 cm  LVIDs (2D):  4.3 cm  LV Mass:     247 g  LV Vol d, MOD A2C: 111.0 ml  LV Vol d, MOD A4C: 112.0 ml  LV Vol d, MOD BP:  116.7 ml  LV Vol s, MOD A2C: 70.9 ml  LV Vol s, MOD A4C: 67.8 ml  LV Vol s, MOD BP:  72.9 ml  LVOT SV MOD BP:    43.8 ml  LV EF% MOD BP:     38 %     MV E Vmax:    0.89 m/s  MV A Vmax:    1.22 m/s  MV E/A:       0.73  e' lateral:   12.60 cm/s  e' medial:    11.60 cm/s  E/e' lateral: 7.10  E/e' medial:  7.71  E/e' Average: 7.39  MV DT:        207 msec    Left Atrium Measurements     LA Diam 2D: 4.20 cm    Right Ventricle Measurements     TAPSE:            2.7 cm  TV Preeti. S':       15.50 cm/s  RV Base (RVID1):  3.5 cm  RV Mid (RVID2):   3.1 cm  RV Major (RVID3): 8.3 cm       LVOT / RVOT/ Qp/Qs Data:  LVOT Diameter: 1.90 cm  LVOT Vmax:     0.83 m/s  LVOT VTI:      17.50 cm  LVOT SV:       49.6 ml    Aortic Valve Measurements     AV Vmax:          380.0 cm/s  AV Peak Gradient: 57.8 mmHg  AV Mean Gradient: 31.0 mmHg  AVVTI:           77.6 cm  AV VTI Ratio:     0.23  AoV EOA, Contin:  0.64 cm²    Mitral Valve Measurements     MV E Vmax: 0.9 m/s  MV A Vmax: 1.2 m/s  MV E/A:    0.7    Tricuspid Valve Measurements     RA Pressure: 3 mmHg  ________________________________________________________________________________________  Diagnosing Physician: Skylar Hernández MD.  Electronically signed on 6/7/2023 at 6:14:52 PM by Skylar Hernández MD     *** Final ***    CxR: Pending  EKG: Pending  Labs: Pending     [facility] - Howard Kimble Pannella, Patel, Savella, Cohen Edgar  Office Number: [phone]    Initial Consult Note    CHIEF COMPLAINT: Patient is a 73y old  Female who presents with a chief complaint of     HPI: This is a 74 y/o F with Systolic HF (EF 38%), mod AS, HTN, T2DM, LBBB, CKD, and lymphedema presented to the ED c/o SOB, CURTIS, orthopnea, and edema for >2 weeks.  States, she has been compliant with her diuretic.  However, admits to be drinking fluids very liberally (at least 3L/day).  Denies CP, palpitations, N/V/abdominal pain, fever, chills, recent travel or sick contact.     PAST MEDICAL & SURGICAL HISTORY:  Hypertension  Diabetes  Lymphedema  H/O left bundle branch block  History of left bundle branch block (LBBB)  Systolic heart failure, chronic  H/O cataract  2020  History of surgical removal of meniscus of knee  left in 1971  Frozen shoulder  2000  H/O Achilles tendon repair  lengthened bilaterally, 2000  Fractured skull  1968    SOCIAL HISTORY:  No tobacco, ethanol, or drug abuse.  FAMILY HISTORY:  Family history of CVA  mom, age 57    No family history of acute MI or sudden cardiac death.  MEDICATIONS  (STANDING):    MEDICATIONS  (PRN):    Allergies    Ativan (Rash; Urticaria)  penicillins (Hives)    Intolerances    REVIEW OF SYSTEMS:  CONSTITUTIONAL: No weakness, fevers or chills  EYES/ENT: No visual changes;  No vertigo or throat pain   NECK: No pain or stiffness  RESPIRATORY: No cough, wheezing, hemoptysis; +shortness of breath +CURTIS, +orthopnea  CARDIOVASCULAR: No chest pain or palpitations  +worsening edema  GASTROINTESTINAL: No abdominal pain. No nausea, vomiting, or hematemesis; No diarrhea or constipation. No melena or hematochezia.  GENITOURINARY: No dysuria, frequency or hematuria  NEUROLOGICAL: No numbness or weakness  SKIN: No itching +chronic rash from eczema  All other review of systems is negative unless indicated above  VITAL SIGNS:   Vital Signs Last 24 Hrs  T(C): 36.3 (29 Mar 2024 15:07), Max: 36.3 (29 Mar 2024 15:07)  T(F): 97.4 (29 Mar 2024 15:07), Max: 97.4 (29 Mar 2024 15:07)  HR: 60 (29 Mar 2024 15:07) (60 - 60)  BP: 136/66 (29 Mar 2024 15:07) (136/66 - 136/66)  BP(mean): --  RR: 18 (29 Mar 2024 15:07) (18 - 18)  SpO2: 99% (29 Mar 2024 15:07) (99% - 99%)    Parameters below as of 29 Mar 2024 15:07  Patient On (Oxygen Delivery Method): room air      I&O's Summary    On Exam:  Constitutional: NAD, alert and oriented x 3  Lungs:  Non-labored, breath sounds are diminished bilaterally, No wheezing, rales or rhonchi  Cardiovascular: RRR.  S1 and S2 positive.  + murmurs, no rubs, gallops or clicks  Gastrointestinal: Bowel Sounds present, soft, nontender.   Lymph: 3-4+ BLE edema. No cervical lymphadenopathy.  Neurological: Alert, no focal deficits  Skin: No ulcers +eczema  Psych:  Mood & affect appropriate.    LABS: All Labs Reviewed:  RADIOLOGY:    TRANSTHORACIC ECHOCARDIOGRAM REPORT  ________________________________________________________________________________                                      ______     Pt. Name:       EVELYN LOPEZ Study Date:    6/6/2023  MRN:            [id number]         YOB: 1950  Accession #:    [id number]        Age:           72 years  Account#:       [id number]       Gender:        F  Heart Rate:                      Height:        70.87 in (180.00 cm)  Rhythm:                          Weight:        ( )  Blood Pressure: 119/57 mmHg      BSA/BMI:       /  ________________________________________________________________________________________  Referring Physician:    [id number] Luis Mcdermott  Interpreting Physician: Skylar Hernández MD  Primary Sonographer:    AS    CPT:               ECHO TTE WO CON COMP W DOPP - 76647.m  Indication(s):     Cardiac murmur, unspecified - R01.1  Procedure:         Transthoracic echocardiogram with 2-D, M-mode and complete                     spectral and color flow Doppler.  Ordering Location: [facility]  _______________________________________________________________________________________  CONCLUSIONS:      1. The left ventricular systolic function is mildly decreased with an ejection fraction of 38 % by Nunes's method of disks.   2. Normal right ventricular cavity size and probably normal systolic function.   3. The left atrium is mildly dilated.   4. The right atrium is normal.   5. Moderate calcification of the aortic valve leaflets.  6. Moderate aortic stenosis.   7. There is moderate calcification of the mitral valve annulus.   8. Trace mitral regurgitation.   9. Trace tricuspid regurgitation.  10. Pulmonic valve was not well visualized.  11. The inferior vena cava is normal measuring 1.86 cm in diameter, (normal <2.1cm) with normal inspiratory collapse (normal >50%) consistent with normal right atrial pressure (~3, range 0-5mmHg).  _______________________________________________________________________________________  FINDINGS:     Left Ventricle:  The left ventricular systolic function is mildly decreased with a calculated ejection fraction of 38 % by the Nunes's biplane method of disks.     Right Ventricle:  Normal right ventricular cavity size and probably normal systolic function. Tricuspid annular plane systolic excursion (TAPSE) is 2.7 cm (normal >=1.7 cm).     Left Atrium:  The left atrium is mildly dilated.     Right Atrium:  The right atrium is normal.     Aortic Valve:  There is moderate calcification of the aortic valve leaflets. There is moderate aortic stenosis. The peak transaortic velocity is 3.80 m/s, peak transaortic gradient is 57.8 mmHg and mean transaortic gradient is 31.0 mmHg with an LVOT/aortic valve VTI ratio of 0.23. The aortic valve area is estimated at 0.64 cm² by the continuity equation.     Mitral Valve:  There is mitral valve thickening of the anterior and posterior leaflets. There is moderate calcification of the mitral valve annulus. There is trace mitral regurgitation.     Tricuspid Valve:  There is trace tricuspid regurgitation.     Pulmonic Valve:  The pulmonic valve was not well visualized.     Systemic Veins:  The inferior vena cava is normal measuring 1.86 cm in diameter, (normal <2.1cm) with normal inspiratorycollapse (normal >50%) consistent with normal right atrial pressure (~3, range 0-5mmHg).  ____________________________________________________________________  QUANTITATIVE DATA  Left Ventricle Measurements                         Indexed BSA  IVSd (2D):   1.1 cm  LVPWd (2D):  1.3 cm  LVIDd (2D):  5.2 cm  LVIDs (2D):  4.3 cm  LV Mass:     247 g  LV Vol d, MOD A2C: 111.0 ml  LV Vol d, MOD A4C: 112.0 ml  LV Vol d, MOD BP:  116.7 ml  LV Vol s, MOD A2C: 70.9 ml  LV Vol s, MOD A4C: 67.8 ml  LV Vol s, MOD BP:  72.9 ml  LVOT SV MOD BP:    43.8 ml  LV EF% MOD BP:     38 %     MV E Vmax:    0.89 m/s  MV A Vmax:    1.22 m/s  MV E/A:       0.73  e' lateral:   12.60 cm/s  e' medial:    11.60 cm/s  E/e' lateral: 7.10  E/e' medial:  7.71  E/e' Average: 7.39  MV DT:        207 msec    Left Atrium Measurements     LA Diam 2D: 4.20 cm    Right Ventricle Measurements     TAPSE:            2.7 cm  TV Preeti. S':       15.50 cm/s  RV Base (RVID1):  3.5 cm  RV Mid (RVID2):   3.1 cm  RV Major (RVID3): 8.3 cm       LVOT / RVOT/ Qp/Qs Data:  LVOT Diameter: 1.90 cm  LVOT Vmax:     0.83 m/s  LVOT VTI:      17.50 cm  LVOT SV:       49.6 ml    Aortic Valve Measurements     AV Vmax:          380.0 cm/s  AV Peak Gradient: 57.8 mmHg  AV Mean Gradient: 31.0 mmHg  AVVTI:           77.6 cm  AV VTI Ratio:     0.23  AoV EOA, Contin:  0.64 cm²    Mitral Valve Measurements     MV E Vmax: 0.9 m/s  MV A Vmax: 1.2 m/s  MV E/A:    0.7    Tricuspid Valve Measurements     RA Pressure: 3 mmHg  ________________________________________________________________________________________  Diagnosing Physician: Skylar Hernández MD.  Electronically signed on 6/7/2023 at 6:14:52 PM by Skylar Hernández MD     *** Final ***    CxR: Pending  EKG: Pending  Labs: Pending     [facility] - Howard Kimble Pannella, Patel, Savella, Cohen Edgar  Office Number: [phone]    Initial Consult Note    CHIEF COMPLAINT: Patient is a 73y old  Female who presents with a chief complaint of     HPI: This is a 72 y/o F with Systolic HF (EF 38%), mod AS, HTN, T1DM, LBBB, CKD, and lymphedema presented to the ED c/o SOB, CURTIS, orthopnea, and edema for >2 weeks.  States, she has been compliant with her diuretic.  However, admits to be drinking fluids very liberally (at least 3L/day).  Denies CP, palpitations, N/V/abdominal pain, fever, chills, recent travel or sick contact.     PAST MEDICAL & SURGICAL HISTORY:  Hypertension  Diabetes  Lymphedema  H/O left bundle branch block  History of left bundle branch block (LBBB)  Systolic heart failure, chronic  H/O cataract  2020  History of surgical removal of meniscus of knee  left in 1971  Frozen shoulder  2000  H/O Achilles tendon repair  lengthened bilaterally, 2000  Fractured skull  1968    SOCIAL HISTORY:  No tobacco, ethanol, or drug abuse.  FAMILY HISTORY:  Family history of CVA  mom, age 57    No family history of acute MI or sudden cardiac death.  MEDICATIONS  (STANDING):    MEDICATIONS  (PRN):    Allergies    Ativan (Rash; Urticaria)  penicillins (Hives)    Intolerances    REVIEW OF SYSTEMS:  CONSTITUTIONAL: No weakness, fevers or chills  EYES/ENT: No visual changes;  No vertigo or throat pain   NECK: No pain or stiffness  RESPIRATORY: No cough, wheezing, hemoptysis; +shortness of breath +CURTIS, +orthopnea  CARDIOVASCULAR: No chest pain or palpitations  +worsening edema  GASTROINTESTINAL: No abdominal pain. No nausea, vomiting, or hematemesis; No diarrhea or constipation. No melena or hematochezia.  GENITOURINARY: No dysuria, frequency or hematuria  NEUROLOGICAL: No numbness or weakness  SKIN: No itching +chronic rash from eczema  All other review of systems is negative unless indicated above  VITAL SIGNS:   Vital Signs Last 24 Hrs  T(C): 36.3 (29 Mar 2024 15:07), Max: 36.3 (29 Mar 2024 15:07)  T(F): 97.4 (29 Mar 2024 15:07), Max: 97.4 (29 Mar 2024 15:07)  HR: 60 (29 Mar 2024 15:07) (60 - 60)  BP: 136/66 (29 Mar 2024 15:07) (136/66 - 136/66)  BP(mean): --  RR: 18 (29 Mar 2024 15:07) (18 - 18)  SpO2: 99% (29 Mar 2024 15:07) (99% - 99%)    Parameters below as of 29 Mar 2024 15:07  Patient On (Oxygen Delivery Method): room air      I&O's Summary    On Exam:  Constitutional: NAD, alert and oriented x 3  Lungs:  Non-labored, breath sounds are diminished bilaterally, No wheezing, rales or rhonchi  Cardiovascular: RRR.  S1 and S2 positive.  + murmurs, no rubs, gallops or clicks  Gastrointestinal: Bowel Sounds present, soft, nontender.   Lymph: 3-4+ BLE edema. No cervical lymphadenopathy.  Neurological: Alert, no focal deficits  Skin: No ulcers +eczema  Psych:  Mood & affect appropriate.    LABS: All Labs Reviewed:  RADIOLOGY:    TRANSTHORACIC ECHOCARDIOGRAM REPORT  ________________________________________________________________________________                                      ______     Pt. Name:       EVELYN LOPEZ Study Date:    6/6/2023  MRN:            [id number]         YOB: 1950  Accession #:    [id number]        Age:           72 years  Account#:       [id number]       Gender:        F  Heart Rate:                      Height:        70.87 in (180.00 cm)  Rhythm:                          Weight:        ( )  Blood Pressure: 119/57 mmHg      BSA/BMI:       /  ________________________________________________________________________________________  Referring Physician:    [id number] Luis Mcdermott  Interpreting Physician: Skylar Hernández MD  Primary Sonographer:    AS    CPT:               ECHO TTE WO CON COMP W DOPP - 37840.m  Indication(s):     Cardiac murmur, unspecified - R01.1  Procedure:         Transthoracic echocardiogram with 2-D, M-mode and complete                     spectral and color flow Doppler.  Ordering Location: [facility]  _______________________________________________________________________________________  CONCLUSIONS:      1. The left ventricular systolic function is mildly decreased with an ejection fraction of 38 % by Nunes's method of disks.   2. Normal right ventricular cavity size and probably normal systolic function.   3. The left atrium is mildly dilated.   4. The right atrium is normal.   5. Moderate calcification of the aortic valve leaflets.  6. Moderate aortic stenosis.   7. There is moderate calcification of the mitral valve annulus.   8. Trace mitral regurgitation.   9. Trace tricuspid regurgitation.  10. Pulmonic valve was not well visualized.  11. The inferior vena cava is normal measuring 1.86 cm in diameter, (normal <2.1cm) with normal inspiratory collapse (normal >50%) consistent with normal right atrial pressure (~3, range 0-5mmHg).  _______________________________________________________________________________________  FINDINGS:     Left Ventricle:  The left ventricular systolic function is mildly decreased with a calculated ejection fraction of 38 % by the Nunes's biplane method of disks.     Right Ventricle:  Normal right ventricular cavity size and probably normal systolic function. Tricuspid annular plane systolic excursion (TAPSE) is 2.7 cm (normal >=1.7 cm).     Left Atrium:  The left atrium is mildly dilated.     Right Atrium:  The right atrium is normal.     Aortic Valve:  There is moderate calcification of the aortic valve leaflets. There is moderate aortic stenosis. The peak transaortic velocity is 3.80 m/s, peak transaortic gradient is 57.8 mmHg and mean transaortic gradient is 31.0 mmHg with an LVOT/aortic valve VTI ratio of 0.23. The aortic valve area is estimated at 0.64 cm² by the continuity equation.     Mitral Valve:  There is mitral valve thickening of the anterior and posterior leaflets. There is moderate calcification of the mitral valve annulus. There is trace mitral regurgitation.     Tricuspid Valve:  There is trace tricuspid regurgitation.     Pulmonic Valve:  The pulmonic valve was not well visualized.     Systemic Veins:  The inferior vena cava is normal measuring 1.86 cm in diameter, (normal <2.1cm) with normal inspiratorycollapse (normal >50%) consistent with normal right atrial pressure (~3, range 0-5mmHg).  ____________________________________________________________________  QUANTITATIVE DATA  Left Ventricle Measurements                         Indexed BSA  IVSd (2D):   1.1 cm  LVPWd (2D):  1.3 cm  LVIDd (2D):  5.2 cm  LVIDs (2D):  4.3 cm  LV Mass:     247 g  LV Vol d, MOD A2C: 111.0 ml  LV Vol d, MOD A4C: 112.0 ml  LV Vol d, MOD BP:  116.7 ml  LV Vol s, MOD A2C: 70.9 ml  LV Vol s, MOD A4C: 67.8 ml  LV Vol s, MOD BP:  72.9 ml  LVOT SV MOD BP:    43.8 ml  LV EF% MOD BP:     38 %     MV E Vmax:    0.89 m/s  MV A Vmax:    1.22 m/s  MV E/A:       0.73  e' lateral:   12.60 cm/s  e' medial:    11.60 cm/s  E/e' lateral: 7.10  E/e' medial:  7.71  E/e' Average: 7.39  MV DT:        207 msec    Left Atrium Measurements     LA Diam 2D: 4.20 cm    Right Ventricle Measurements     TAPSE:            2.7 cm  TV Preeti. S':       15.50 cm/s  RV Base (RVID1):  3.5 cm  RV Mid (RVID2):   3.1 cm  RV Major (RVID3): 8.3 cm       LVOT / RVOT/ Qp/Qs Data:  LVOT Diameter: 1.90 cm  LVOT Vmax:     0.83 m/s  LVOT VTI:      17.50 cm  LVOT SV:       49.6 ml    Aortic Valve Measurements     AV Vmax:          380.0 cm/s  AV Peak Gradient: 57.8 mmHg  AV Mean Gradient: 31.0 mmHg  AVVTI:           77.6 cm  AV VTI Ratio:     0.23  AoV EOA, Contin:  0.64 cm²    Mitral Valve Measurements     MV E Vmax: 0.9 m/s  MV A Vmax: 1.2 m/s  MV E/A:    0.7    Tricuspid Valve Measurements     RA Pressure: 3 mmHg  ________________________________________________________________________________________  Diagnosing Physician: Syklar Hernández MD.  Electronically signed on 6/7/2023 at 6:14:52 PM by Skylar Hernández MD     *** Final ***    CxR: Pending  EKG: Pending  Labs: Pending

## 2024-03-30 NOTE — PROGRESS NOTE ADULT - ASSESSMENT
72 y/o F with Systolic HF (EF 38%), mod AS, HTN, T2DM, LBBB, CKD, and lymphedema presented to the ED c/o SOB, CURTIS, orthopnea, and edema for >2 weeks.  States, she has been compliant with her diuretic.  However, admits to be drinking fluids very judiciously (at least 3L/day). Admitted with acute on chr CHF exacerbation. Follows with Dr. Luong    Acute on chronic Systolic HF (EF 38%), mod AS, HTN, LBBB, Lymphedema   - Has known systolic HF, EF 38% (2023), mod AS.    - On home Torsemide 20 mg daily, Eplerenone 25 mg daily, and Toprol  mg daily  - Compliant with all meds including Torsemide, though, it appears that she has been drinking fluids excessively  - BNP:  <--35013  - Hold home Torsemide and home Eplerenone for now, torsemide dosing had been more frequent prior to the last admission, now once daily  - Continue Lasix IV 60 mg q12H  - Continue home Toprol XL  - Fluid restriction of 1.5L/day  - Strict I/O's and daily weights.  Patient has known lymphedema  - Please obtain transthoracic echocardiogram to assess ventricular function, wall motion and valvular abnormalities.     - Has known CKD.    - Creatinine:  <--2.70  - Monitor renal function closely.  Avoid nephrotoxics.  Recommend Renal eval    - Has a known LBBB  - Had LHC in 7/2022 showing non-obstructive CAD  - Troponin: <-22.2  - Continue ASA and statin  - Tele w/ SR 50-60s, no events, can d/c telemetry    - BP stable and controlled     - Monitor and replete lytes, keep K>4, Mg>2.  - Will continue to follow.    Jose E Felipe, MS FNP, AGACNP  Nurse Practitioner- Cardiology   Please call on TEAMS

## 2024-03-30 NOTE — PROGRESS NOTE ADULT - SUBJECTIVE AND OBJECTIVE BOX
Patient is a 73y old  Female who presents with a chief complaint of Acute on chronic systolic CHF (30 Mar 2024 09:10)      INTERVAL HPI/OVERNIGHT EVENTS: No acute overnight events. Pt was seen and examined at bedside. Pt states that her sob is much improved, and no longer has productive cough on Lasix. Denies fevers/chills, HA, cp/palp, n/v/d/c.  Pt denies headache, dizziness, lightheadedness, fever, chills, body aches, CP, SOB, palpitations, abdominal pain, n/v, numbness/tingling.  No other complaints at this time.     MEDICATIONS  (STANDING):  aspirin  chewable 81 milliGRAM(s) Oral daily  atorvastatin 80 milliGRAM(s) Oral at bedtime  dextrose 5%. 1000 milliLiter(s) (100 mL/Hr) IV Continuous <Continuous>  dextrose 5%. 1000 milliLiter(s) (50 mL/Hr) IV Continuous <Continuous>  dextrose 50% Injectable 12.5 Gram(s) IV Push once  dextrose 50% Injectable 25 Gram(s) IV Push once  dextrose 50% Injectable 25 Gram(s) IV Push once  ferrous    sulfate 325 milliGRAM(s) Oral two times a day  furosemide   Injectable 60 milliGRAM(s) IV Push two times a day  glucagon  Injectable 1 milliGRAM(s) IntraMuscular once  heparin   Injectable 5000 Unit(s) SubCutaneous every 12 hours  insulin glargine Injectable (LANTUS) 20 Unit(s) SubCutaneous at bedtime  insulin lispro (ADMELOG) corrective regimen sliding scale   SubCutaneous three times a day before meals  levothyroxine 75 MICROGram(s) Oral daily  metoprolol succinate  milliGRAM(s) Oral daily  nystatin Powder 1 Application(s) Topical two times a day  pantoprazole    Tablet 40 milliGRAM(s) Oral before breakfast    MEDICATIONS  (PRN):  dextrose Oral Gel 15 Gram(s) Oral once PRN Blood Glucose LESS THAN 70 milliGRAM(s)/deciliter      Allergies    Ativan (Rash; Urticaria)  penicillins (Hives)    Intolerances                     REVIEW OF SYSTEMS:  CONSTITUTIONAL: No fever or chills  HEENT:  No headache, no sore throat  RESPIRATORY: +Dry cough, +SOB.   CARDIOVASCULAR: No chest pain, palpitations  GASTROINTESTINAL: No abd pain, nausea, vomiting, or diarrhea  GENITOURINARY: No dysuria, frequency, or hematuria    Vital Signs Last 24 Hrs  T(C): 36.4 (30 Mar 2024 04:37), Max: 36.5 (29 Mar 2024 21:15)  T(F): 97.5 (30 Mar 2024 04:37), Max: 97.7 (29 Mar 2024 21:15)  HR: 71 (30 Mar 2024 04:37) (54 - 71)  BP: 122/74 (30 Mar 2024 04:37) (117/67 - 136/66)  BP(mean): --  RR: 18 (30 Mar 2024 04:37) (18 - 18)  SpO2: 90% (30 Mar 2024 04:37) (90% - 99%)    Parameters below as of 30 Mar 2024 04:37  Patient On (Oxygen Delivery Method): room air        PHYSICAL EXAM:  GENERAL: NAD  HEENT:  anicteric, moist mucous membranes  CHEST/LUNG:  BL Crackles at bases  HEART:  S1S2, RRR. Pansystolic murmur auscultated in all areas.  ABDOMEN:  BS+, soft, nontender, nondistended  EXTREMITIES: Severe lymphedema with 4+ pitting in BLLE. Diffuse xerosis on BLLE with venous stasis dermatitis.  NERVOUS SYSTEM: AAO x3. Answers questions and follows commands appropriately    LABS:                        11.6   5.58  )-----------( 161      ( 30 Mar 2024 08:21 )             36.7     CBC Full  -  ( 30 Mar 2024 08:21 )  WBC Count : 5.58 K/uL  Hemoglobin : 11.6 g/dL  Hematocrit : 36.7 %  Platelet Count - Automated : 161 K/uL  Mean Cell Volume : 89.5 fl  Mean Cell Hemoglobin : 28.3 pg  Mean Cell Hemoglobin Concentration : 31.6 gm/dL  Auto Neutrophil # : 3.81 K/uL  Auto Lymphocyte # : 0.70 K/uL  Auto Monocyte # : 0.71 K/uL  Auto Eosinophil # : 0.25 K/uL  Auto Basophil # : 0.07 K/uL  Auto Neutrophil % : 68.3 %  Auto Lymphocyte % : 12.5 %  Auto Monocyte % : 12.7 %  Auto Eosinophil % : 4.5 %  Auto Basophil % : 1.3 %    30 Mar 2024 08:21    141    |  105    |  55     ----------------------------<  140    4.2     |  27     |  2.50     Ca    8.3        30 Mar 2024 08:21  Phos  3.7       30 Mar 2024 08:21  Mg     2.4       30 Mar 2024 08:21    TPro  7.2    /  Alb  3.3    /  TBili  0.9    /  DBili  x      /  AST  17     /  ALT  13     /  AlkPhos  67     30 Mar 2024 08:21    PT/INR - ( 29 Mar 2024 18:40 )   PT: 13.0 sec;   INR: 1.11 ratio         PTT - ( 29 Mar 2024 18:40 )  PTT:32.1 sec  Urinalysis Basic - ( 30 Mar 2024 08:21 )    Color: x / Appearance: x / SG: x / pH: x  Gluc: 140 mg/dL / Ketone: x  / Bili: x / Urobili: x   Blood: x / Protein: x / Nitrite: x   Leuk Esterase: x / RBC: x / WBC x   Sq Epi: x / Non Sq Epi: x / Bacteria: x      CAPILLARY BLOOD GLUCOSE      POCT Blood Glucose.: 189 mg/dL (30 Mar 2024 12:02)  POCT Blood Glucose.: 132 mg/dL (30 Mar 2024 07:51)  POCT Blood Glucose.: 169 mg/dL (30 Mar 2024 01:56)  POCT Blood Glucose.: 141 mg/dL (30 Mar 2024 00:07)        Consultant(s) Notes Reviewed:  [x] YES  [ ] NO

## 2024-03-30 NOTE — PROGRESS NOTE ADULT - PROBLEM SELECTOR PLAN 2
- DUNCAN on CKD on admission (2.7 on ADM, baseline 2.4)  - Stable, acutely requiring IV Diuresis  - Monitor daily chemistry

## 2024-03-30 NOTE — CONSULT NOTE ADULT - ASSESSMENT
DUNCAN DUNCAN on CKD 4  CHF  HTN  LE Edema    -Baseline Creatinine approx 2.4  -DUNCAN likely 2/2 to CRS  -Urine lytes will be less helpful in the setting of diuretic administration  -Will need to cont diuretic and monitor renal tolerance  -BP well controlled

## 2024-03-30 NOTE — PROGRESS NOTE ADULT - PROBLEM SELECTOR PLAN 1
- Pt reports poor fluid control at home  - Tele, Strict I&O, Fluid restrict  - Will start IV Lasix 60mg BID and monitor kidney function closely.   - Hold home Torsemide, Eplerenone --> c/w IV Lasix BID  - C/w home Toprol  - Seen by Cardiology as per evaluation pt is on home Torsemide 20mg daily, will hold for admission and start IV Lasix BID. Will c/w home Toprol XL 100mg daily, hold home Eplerenone 25mg daily.   - Ordered TTE, will follow up with results.  - LBBB on EKG is chronic and known, as per cardo pt had a left heart cath in 2022 which was benign, will c/w home ASA and statin on this admit.   - Will follow up with further Cardio reccs

## 2024-03-30 NOTE — PROGRESS NOTE ADULT - PROBLEM SELECTOR PLAN 3
- Pt is on Lantus 37U at bedtime at home  - Lantus 20U qHS, ISS  - Well-controlled inpatient; A1C 6.8  - Monitor fingersticks qAC&HS

## 2024-03-30 NOTE — CONSULT NOTE ADULT - SUBJECTIVE AND OBJECTIVE BOX
Patient is a 73y old  Female who presents with a chief complaint of Acute on chronic systolic CHF (30 Mar 2024 09:10)       HPI:  73F with a PMHx of Chronic Systolic CHF (last TTE with EF 38%), moderate aortic stenosis, known LBBB, HTN, DM2, CKD, hypothyroidism, and chronic lymphedema presents to the ED with shortness of breath over the past two weeks which has been acutely worsening. Pt states she is compliant with her medications but has not be as compliant with monitoring her fluid intake. Currently she denies chest pain, dizziness, abdominal pain, nausea, vomiting, diarrhea, fever, or chills. (29 Mar 2024 21:19)  SOB improving.  Renal consulted for DUNCAN on CKD.  Sees Dr. MCCRARY in office.  Urinating well with diuretic.        PAST MEDICAL & SURGICAL HISTORY:  Hypertension      Diabetes      Lymphedema      H/O left bundle branch block      History of left bundle branch block (LBBB)      Systolic heart failure, chronic      H/O cataract        History of surgical removal of meniscus of knee  left in       Frozen shoulder        H/O Achilles tendon repair  lengthened bilaterally,       Fractured skull             FAMILY HISTORY:  Family history of CVA  mom, age 57    [place]    Social History:Non smoker    MEDICATIONS  (STANDING):  aspirin  chewable 81 milliGRAM(s) Oral daily  atorvastatin 80 milliGRAM(s) Oral at bedtime  dextrose 5%. 1000 milliLiter(s) (100 mL/Hr) IV Continuous <Continuous>  dextrose 5%. 1000 milliLiter(s) (50 mL/Hr) IV Continuous <Continuous>  dextrose 50% Injectable 12.5 Gram(s) IV Push once  dextrose 50% Injectable 25 Gram(s) IV Push once  dextrose 50% Injectable 25 Gram(s) IV Push once  ferrous    sulfate 325 milliGRAM(s) Oral two times a day  furosemide   Injectable 60 milliGRAM(s) IV Push two times a day  glucagon  Injectable 1 milliGRAM(s) IntraMuscular once  heparin   Injectable 5000 Unit(s) SubCutaneous every 12 hours  insulin glargine Injectable (LANTUS) 20 Unit(s) SubCutaneous at bedtime  insulin lispro (ADMELOG) corrective regimen sliding scale   SubCutaneous three times a day before meals  levothyroxine 75 MICROGram(s) Oral daily  metoprolol succinate  milliGRAM(s) Oral daily  nystatin Powder 1 Application(s) Topical two times a day  pantoprazole    Tablet 40 milliGRAM(s) Oral before breakfast    MEDICATIONS  (PRN):  dextrose Oral Gel 15 Gram(s) Oral once PRN Blood Glucose LESS THAN 70 milliGRAM(s)/deciliter   Meds reviewed    Allergies    Ativan (Rash; Urticaria)  penicillins (Hives)    Intolerances         REVIEW OF SYSTEMS:    Review of Systems:   Constitutional: Denies fatigue  HEENT: Denies headaches and dizziness  Respiratory: improving SOB  Cardiovascular: denies CP, palpitations  Gastrointestinal: Denies nausea, denies vomiting, diarrhea, constipation, abdominal pain, or bloody stools  Genitourinary: denies painful urination, increased frequency, urgency, or bloody urine  Skin: denies rashes or itching  Musculoskeletal: denies muscle aches, joint swelling  Neurologic: Denies generalized weakness, denies loss of sensation, numbness, or tingling      Vital Signs Last 24 Hrs  T(C): 36.4 (30 Mar 2024 04:37), Max: 36.5 (29 Mar 2024 21:15)  T(F): 97.5 (30 Mar 2024 04:37), Max: 97.7 (29 Mar 2024 21:15)  HR: 71 (30 Mar 2024 04:37) (54 - 71)  BP: 122/74 (30 Mar 2024 04:37) (117/67 - 136/66)  BP(mean): --  RR: 18 (30 Mar 2024 04:37) (18 - 18)  SpO2: 90% (30 Mar 2024 04:37) (90% - 99%)    Parameters below as of 30 Mar 2024 04:37  Patient On (Oxygen Delivery Method): room air      Daily     Daily Weight in k.3 (30 Mar 2024 04:37)    PHYSICAL EXAM:    GENERAL: NAD  HEAD:  Atraumatic, Normocephalic  EYES: EOMI, conjunctiva and sclera clear  ENMT: No Drainage from nares, No drainage from ears  NERVOUS SYSTEM:  Awake and Alert  CHEST/LUNG: Decreased  EXTREMITIES:  ++Edema  SKIN: No rashes No obvious ecchymosis      LABS:                        11.6   5.58  )-----------( 161      ( 30 Mar 2024 08:21 )             36.7     03-30    141  |  105  |  55<H>  ----------------------------<  140<H>  4.2   |  27  |  2.50<H>    Ca    8.3<L>      30 Mar 2024 08:21  Phos  3.7       Mg     2.4         TPro  7.2  /  Alb  3.3  /  TBili  0.9  /  DBili  x   /  AST  17  /  ALT  13  /  AlkPhos  67      PT/INR - ( 29 Mar 2024 18:40 )   PT: 13.0 sec;   INR: 1.11 ratio         PTT - ( 29 Mar 2024 18:40 )  PTT:32.1 sec  Urinalysis Basic - ( 30 Mar 2024 08:21 )    Color: x / Appearance: x / SG: x / pH: x  Gluc: 140 mg/dL / Ketone: x  / Bili: x / Urobili: x   Blood: x / Protein: x / Nitrite: x   Leuk Esterase: x / RBC: x / WBC x   Sq Epi: x / Non Sq Epi: x / Bacteria: x      Magnesium: 2.4 mg/dL ( @ 08:21)  Phosphorus: 3.7 mg/dL ( @ 08:21)  Magnesium: 2.4 mg/dL ( @ 18:40)          RADIOLOGY & ADDITIONAL TESTS:

## 2024-03-30 NOTE — PROGRESS NOTE ADULT - SUBJECTIVE AND OBJECTIVE BOX
[facility] -- Howard Kimble, Carlos Luong Savella, Goodger, Cohen: Office # [phone]    Follow Up:  Vol ol     Subjective/Observations: Patient seen and examined. Patient awake, alert, resting in bed. No complaints of chest pain, dyspnea, palpitations or dizziness. SOB, LE edema and CURTIS improving. Admits to some CURTIS. Has some chr LE edema. Tolerating room air.     REVIEW OF SYSTEMS: All other review of systems are negative unless indicated above    PAST MEDICAL & SURGICAL HISTORY:  Hypertension      Hypertension      Diabetes      Lymphedema      H/O left bundle branch block      History of left bundle branch block (LBBB)      Systolic heart failure, chronic      H/O cataract  2020      History of surgical removal of meniscus of knee  left in 1971      Frozen shoulder  2000      H/O Achilles tendon repair  lengthened bilaterally, 2000      Fractured skull  1968          MEDICATIONS  (STANDING):  aspirin  chewable 81 milliGRAM(s) Oral daily  atorvastatin 80 milliGRAM(s) Oral at bedtime  dextrose 5%. 1000 milliLiter(s) (100 mL/Hr) IV Continuous <Continuous>  dextrose 5%. 1000 milliLiter(s) (50 mL/Hr) IV Continuous <Continuous>  dextrose 50% Injectable 12.5 Gram(s) IV Push once  dextrose 50% Injectable 25 Gram(s) IV Push once  dextrose 50% Injectable 25 Gram(s) IV Push once  ferrous    sulfate 325 milliGRAM(s) Oral two times a day  furosemide   Injectable 60 milliGRAM(s) IV Push two times a day  glucagon  Injectable 1 milliGRAM(s) IntraMuscular once  heparin   Injectable 5000 Unit(s) SubCutaneous every 12 hours  insulin glargine Injectable (LANTUS) 20 Unit(s) SubCutaneous at bedtime  insulin lispro (ADMELOG) corrective regimen sliding scale   SubCutaneous three times a day before meals  levothyroxine 75 MICROGram(s) Oral daily  metoprolol succinate  milliGRAM(s) Oral daily  nystatin Powder 1 Application(s) Topical two times a day  pantoprazole    Tablet 40 milliGRAM(s) Oral before breakfast    MEDICATIONS  (PRN):  dextrose Oral Gel 15 Gram(s) Oral once PRN Blood Glucose LESS THAN 70 milliGRAM(s)/deciliter    Allergies    Ativan (Rash; Urticaria)  penicillins (Hives)    Intolerances      Vital Signs Last 24 Hrs  T(C): 36.4 (30 Mar 2024 04:37), Max: 36.5 (29 Mar 2024 21:15)  T(F): 97.5 (30 Mar 2024 04:37), Max: 97.7 (29 Mar 2024 21:15)  HR: 71 (30 Mar 2024 04:37) (54 - 71)  BP: 122/74 (30 Mar 2024 04:37) (117/67 - 136/66)  BP(mean): --  RR: 18 (30 Mar 2024 04:37) (18 - 18)  SpO2: 90% (30 Mar 2024 04:37) (90% - 99%)    Parameters below as of 30 Mar 2024 04:37  Patient On (Oxygen Delivery Method): room air      I&O's Summary    29 Mar 2024 07:01  -  30 Mar 2024 07:00  --------------------------------------------------------  IN: 0 mL / OUT: 2500 mL / NET: -2500 mL      Weight (kg): 149.7 (03-29 @ 15:07)    TELE: SR 50-60s   PHYSICAL EXAM:  Constitutional: NAD, awake and alert, Obese   HEENT: Moist Mucous Membranes, Anicteric  Pulmonary: Non-labored, breath sounds are clear bilaterally, Diminished at bases No wheezing, rales or rhonchi  Cardiovascular: Regular, S1 and S2, + murmurs, No rubs, gallops or clicks  Gastrointestinal:  soft, nontender, nondistended   Lymph: +2-3 chr peripheral edema with chronic venous stasis skin changes. No lymphadenopathy.   Skin: No visible rashes or ulcers.  Psych:  Mood & affect appropriate      LABS: All Labs Reviewed:                        12.0   6.64  )-----------( 181      ( 29 Mar 2024 18:40 )             38.4     29 Mar 2024 18:40    141    |  103    |  54     ----------------------------<  137    4.3     |  29     |  2.70     Ca    8.3        29 Mar 2024 18:40  Mg     2.4       29 Mar 2024 18:40    TPro  7.7    /  Alb  3.5    /  TBili  0.9    /  DBili  x      /  AST  15     /  ALT  13     /  AlkPhos  74     29 Mar 2024 18:40   LIVER FUNCTIONS - ( 29 Mar 2024 18:40 )  Alb: 3.5 g/dL / Pro: 7.7 g/dL / ALK PHOS: 74 U/L / ALT: 13 U/L / AST: 15 U/L / GGT: x           PT/INR - ( 29 Mar 2024 18:40 )   PT: 13.0 sec;   INR: 1.11 ratio         PTT - ( 29 Mar 2024 18:40 )  PTT:32.1 secTroponin I, High Sensitivity Result: 22.2 ng/L (03-29-24 @ 18:40)        TRANSTHORACIC ECHOCARDIOGRAM REPORT  ________________________________________________________________________________                                      ______     Pt. Name:       EVELYN LPOEZ Study Date:    6/6/2023  MRN:            [id number]         YOB: 1950  Accession #:    [id number]        Age:           72 years  Account#:       [id number]       Gender:        F  Heart Rate:                      Height:        70.87 in (180.00 cm)  Rhythm:                          Weight:        ( )  Blood Pressure: 119/57 mmHg      BSA/BMI:       /  ________________________________________________________________________________________  Referring Physician:    [id number] Luis Mcdermott  Interpreting Physician: Skylar Hernández MD  Primary Sonographer:    AS    CPT:               ECHO TTE WO CON COMP W DOPP - 08524.m  Indication(s):     Cardiac murmur, unspecified - R01.1  Procedure:         Transthoracic echocardiogram with 2-D, M-mode and complete                     spectral and color flow Doppler.  Ordering Location: ICU1    _______________________________________________________________________________________  CONCLUSIONS:      1. The left ventricular systolic function is mildly decreased with an ejection fraction of 38 % by Nunes's method of disks.   2. Normal right ventricular cavity size and probably normal systolic function.   3. The left atrium is mildly dilated.   4. The right atrium is normal.   5. Moderate calcification of the aortic valve leaflets.  6. Moderate aortic stenosis.   7. There is moderate calcification of the mitral valve annulus.   8. Trace mitral regurgitation.   9. Trace tricuspid regurgitation.  10. Pulmonic valve was not well visualized.  11. The inferior vena cava is normal measuring 1.86 cm in diameter, (normal <2.1cm) with normal inspiratory collapse (normal >50%) consistent with normal right atrial pressure (~3, range 0-5mmHg).    ________________________________________________________________________________________  FINDINGS:     Left Ventricle:  The left ventricular systolic function is mildly decreased with a calculated ejection fraction of 38 % by the Nunes's biplane method of disks.     Right Ventricle:  Normal right ventricular cavity size and probably normal systolic function. Tricuspid annular plane systolic excursion (TAPSE) is 2.7 cm (normal >=1.7 cm).     Left Atrium:  The left atrium is mildly dilated.     Right Atrium:  The right atrium is normal.     Aortic Valve:  There is moderate calcification of the aortic valve leaflets. There is moderate aortic stenosis. The peak transaortic velocity is 3.80 m/s, peak transaortic gradient is 57.8 mmHg and mean transaortic gradient is 31.0 mmHg with an LVOT/aortic valve VTI ratio of 0.23. The aortic valve area is estimated at 0.64 cm² by the continuity equation.     Mitral Valve:  There is mitral valve thickening of the anterior and posterior leaflets. There is moderate calcification of the mitral valve annulus. There is trace mitral regurgitation.     Tricuspid Valve:  There is trace tricuspid regurgitation.     Pulmonic Valve:  The pulmonic valve was not well visualized.     Systemic Veins:  The inferior vena cava is normal measuring 1.86 cm in diameter, (normal <2.1cm) with normal inspiratorycollapse (normal >50%) consistent with normal right atrial pressure (~3, range 0-5mmHg).  ____________________________________________________________________  QUANTITATIVE DATA  Left Ventricle Measurements                         Indexed BSA  IVSd (2D):   1.1 cm  LVPWd (2D):  1.3 cm  LVIDd (2D):  5.2 cm  LVIDs (2D):  4.3 cm  LV Mass:     247 g  LV Vol d, MOD A2C: 111.0 ml  LV Vol d, MOD A4C: 112.0 ml  LV Vol d, MOD BP:  116.7 ml  LV Vol s, MOD A2C: 70.9 ml  LV Vol s, MOD A4C: 67.8 ml  LV Vol s, MOD BP:  72.9 ml  LVOT SV MOD BP:    43.8 ml  LV EF% MOD BP:     38 %     MV E Vmax:    0.89 m/s  MV A Vmax:    1.22 m/s  MV E/A:       0.73  e' lateral:   12.60 cm/s  e' medial:    11.60 cm/s  E/e' lateral: 7.10  E/e' medial:  7.71  E/e' Average: 7.39  MV DT:        207 msec       Left Atrium Measurements     LA Diam 2D: 4.20 cm    Right Ventricle Measurements     TAPSE:            2.7 cm  TV Preeti. S':       15.50 cm/s  RV Base (RVID1):  3.5 cm  RV Mid (RVID2):   3.1 cm  RV Major (RVID3): 8.3 cm       LVOT / RVOT/ Qp/Qs Data:  LVOT Diameter: 1.90 cm  LVOT Vmax:     0.83 m/s  LVOT VTI:      17.50 cm  LVOT SV:       49.6 ml    Aortic Valve Measurements     AV Vmax:          380.0 cm/s  AV Peak Gradient: 57.8 mmHg  AV Mean Gradient: 31.0 mmHg  AVVTI:           77.6 cm  AV VTI Ratio:     0.23  AoV EOA, Contin:  0.64 cm²    Mitral Valve Measurements     MV E Vmax: 0.9 m/s  MV A Vmax: 1.2 m/s  MV E/A:    0.7       Tricuspid Valve Measurements     RA Pressure: 3 mmHg    ________________________________________________________________________________________  Diagnosing Physician: Skylar Hernández MD.  Electronically signed on 6/7/2023 at 6:14:52 PM by Skylar Hernández MD         *** Final ***

## 2024-03-30 NOTE — PATIENT PROFILE ADULT - FALL HARM RISK - RISK INTERVENTIONS

## 2024-03-31 NOTE — PROGRESS NOTE ADULT - SUBJECTIVE AND OBJECTIVE BOX
[facility] -- Howard Kimble Pannella, Patel, Savella, Goodger, Cohen: Office # [phone]    Follow Up:  Vol ol     Subjective/Observations: Patient seen and examined. Patient awake, alert, resting in bed. No complaints of chest pain, dyspnea, palpitations or dizziness. SOB, LE edema and CURTIS improving. Has some chr LE edema. Tolerating room air.    REVIEW OF SYSTEMS: All other review of systems are negative unless indicated above    PAST MEDICAL & SURGICAL HISTORY:  Hypertension      Hypertension      Diabetes      Lymphedema      H/O left bundle branch block      History of left bundle branch block (LBBB)      Systolic heart failure, chronic      H/O cataract  2020      History of surgical removal of meniscus of knee  left in 1971      Frozen shoulder  2000      H/O Achilles tendon repair  lengthened bilaterally, 2000      Fractured skull  1968    MEDICATIONS  (STANDING):  aspirin  chewable 81 milliGRAM(s) Oral daily  atorvastatin 80 milliGRAM(s) Oral at bedtime  dextrose 5%. 1000 milliLiter(s) (50 mL/Hr) IV Continuous <Continuous>  dextrose 5%. 1000 milliLiter(s) (100 mL/Hr) IV Continuous <Continuous>  dextrose 50% Injectable 12.5 Gram(s) IV Push once  dextrose 50% Injectable 25 Gram(s) IV Push once  dextrose 50% Injectable 25 Gram(s) IV Push once  ferrous    sulfate 325 milliGRAM(s) Oral two times a day  furosemide   Injectable 60 milliGRAM(s) IV Push two times a day  glucagon  Injectable 1 milliGRAM(s) IntraMuscular once  heparin   Injectable 5000 Unit(s) SubCutaneous every 12 hours  insulin glargine Injectable (LANTUS) 20 Unit(s) SubCutaneous at bedtime  insulin lispro (ADMELOG) corrective regimen sliding scale   SubCutaneous three times a day before meals  levothyroxine 75 MICROGram(s) Oral daily  metoprolol succinate  milliGRAM(s) Oral daily  nystatin Powder 1 Application(s) Topical two times a day  pantoprazole    Tablet 40 milliGRAM(s) Oral before breakfast    MEDICATIONS  (PRN):  dextrose Oral Gel 15 Gram(s) Oral once PRN Blood Glucose LESS THAN 70 milliGRAM(s)/deciliter    Allergies    Ativan (Rash; Urticaria)  penicillins (Hives)    Intolerances      Vital Signs Last 24 Hrs  T(C): 36.6 (31 Mar 2024 04:05), Max: 36.7 (30 Mar 2024 20:00)  T(F): 97.9 (31 Mar 2024 04:05), Max: 98 (30 Mar 2024 20:00)  HR: 61 (31 Mar 2024 04:05) (57 - 65)  BP: 132/67 (31 Mar 2024 04:05) (113/54 - 132/67)  BP(mean): --  RR: 19 (31 Mar 2024 04:05) (18 - 19)  SpO2: 91% (31 Mar 2024 04:05) (91% - 91%)    Parameters below as of 31 Mar 2024 04:05  Patient On (Oxygen Delivery Method): room air      I&O's Summary    30 Mar 2024 07:01  -  31 Mar 2024 07:00  --------------------------------------------------------  IN: 0 mL / OUT: 2150 mL / NET: -2150 mL      TELE: SR 50-60s   PHYSICAL EXAM:  Constitutional: NAD, awake and alert, Obese   HEENT: Moist Mucous Membranes, Anicteric  Pulmonary: Non-labored, breath sounds are clear bilaterally, Diminished at bases No wheezing, rales or rhonchi  Cardiovascular: Regular, S1 and S2, + murmurs, No rubs, gallops or clicks  Gastrointestinal:  soft, nontender, nondistended   Lymph: +2-3 chr peripheral edema with chronic venous stasis skin changes. No lymphadenopathy.   Skin: No visible rashes or ulcers.  Psych:  Mood & affect appropriate    LABS: All Labs Reviewed:                        11.6   5.58  )-----------( 161      ( 30 Mar 2024 08:21 )             36.7                         12.0   6.64  )-----------( 181      ( 29 Mar 2024 18:40 )             38.4     30 Mar 2024 08:21    141    |  105    |  55     ----------------------------<  140    4.2     |  27     |  2.50   29 Mar 2024 18:40    141    |  103    |  54     ----------------------------<  137    4.3     |  29     |  2.70     Ca    8.3        30 Mar 2024 08:21  Ca    8.3        29 Mar 2024 18:40  Phos  3.7       30 Mar 2024 08:21  Mg     2.4       30 Mar 2024 08:21  Mg     2.4       29 Mar 2024 18:40    TPro  7.2    /  Alb  3.3    /  TBili  0.9    /  DBili  x      /  AST  17     /  ALT  13     /  AlkPhos  67     30 Mar 2024 08:21  TPro  7.7    /  Alb  3.5    /  TBili  0.9    /  DBili  x      /  AST  15     /  ALT  13     /  AlkPhos  74     29 Mar 2024 18:40   LIVER FUNCTIONS - ( 30 Mar 2024 08:21 )  Alb: 3.3 g/dL / Pro: 7.2 g/dL / ALK PHOS: 67 U/L / ALT: 13 U/L / AST: 17 U/L / GGT: x           PT/INR - ( 29 Mar 2024 18:40 )   PT: 13.0 sec;   INR: 1.11 ratio         PTT - ( 29 Mar 2024 18:40 )  PTT:32.1 secTroponin I, High Sensitivity Result: 22.2 ng/L (03-29-24 @ 18:40)        TRANSTHORACIC ECHOCARDIOGRAM REPORT  ________________________________________________________________________________                                      _______       Pt. Name:       EVELYN LOPEZ Study Date:    6/6/2023  MRN:            [id number]         YOB: 1950  Accession #:    [id number]        Age:           72 years  Account#:       [id number]       Gender:        F  Heart Rate:                      Height:        70.87 in (180.00 cm)  Rhythm:                          Weight:        ( )  Blood Pressure: 119/57 mmHg      BSA/BMI:       /  ________________________________________________________________________________________  Referring Physician:    [id number] Luis Mcdermott  Interpreting Physician: Skylar Hernández MD  Primary Sonographer:    AS    CPT:               ECHO TTE WO CON COMP W DOPP - 65801.m  Indication(s):     Cardiac murmur, unspecified - R01.1  Procedure:         Transthoracic echocardiogram with 2-D, M-mode and complete                     spectral and color flow Doppler.  Ordering Location: ICU1    _______________________________________________________________________________________  CONCLUSIONS:      1. The left ventricular systolic function is mildly decreased with an ejection fraction of 38 % by Nunes's method of disks.   2. Normal right ventricular cavity size and probably normal systolic function.   3. The left atrium is mildly dilated.   4. The right atrium is normal.   5. Moderate calcification of the aortic valve leaflets.  6. Moderate aortic stenosis.   7. There is moderate calcification of the mitral valve annulus.   8. Trace mitral regurgitation.   9. Trace tricuspid regurgitation.  10. Pulmonic valve was not well visualized.  11. The inferior vena cava is normal measuring 1.86 cm in diameter, (normal <2.1cm) with normal inspiratory collapse (normal >50%) consistent with normal right atrial pressure (~3, range 0-5mmHg).    ________________________________________________________________________________________  FINDINGS:     Left Ventricle:  The left ventricular systolic function is mildly decreased with a calculated ejection fraction of 38 % by the Nunes's biplane method of disks.     Right Ventricle:  Normal right ventricular cavity size and probably normal systolic function. Tricuspid annular plane systolic excursion (TAPSE) is 2.7 cm (normal >=1.7 cm).     Left Atrium:  The left atrium is mildly dilated.     Right Atrium:  The right atrium is normal.     Aortic Valve:  There is moderate calcification of the aortic valve leaflets. There is moderate aortic stenosis. The peak transaortic velocity is 3.80 m/s, peak transaortic gradient is 57.8 mmHg and mean transaortic gradient is 31.0 mmHg with an LVOT/aortic valve VTI ratio of 0.23. The aortic valve area is estimated at 0.64 cm² by the continuity equation.     Mitral Valve:  There is mitral valve thickening of the anterior and posterior leaflets. There is moderate calcification of the mitral valve annulus. There is trace mitral regurgitation.     Tricuspid Valve:  There is trace tricuspid regurgitation.     Pulmonic Valve:  The pulmonic valve was not well visualized.     Systemic Veins:  The inferior vena cava is normal measuring 1.86 cm in diameter, (normal <2.1cm) with normal inspiratorycollapse (normal >50%) consistent with normal right atrial pressure (~3, range 0-5mmHg).  ____________________________________________________________________  QUANTITATIVE DATA  Left Ventricle Measurements                         Indexed BSA  IVSd (2D):   1.1 cm  LVPWd (2D):  1.3 cm  LVIDd (2D):  5.2 cm  LVIDs (2D):  4.3 cm  LV Mass:     247 g  LV Vol d, MOD A2C: 111.0 ml  LV Vol d, MOD A4C: 112.0 ml  LV Vol d, MOD BP:  116.7 ml  LV Vol s, MOD A2C: 70.9 ml  LV Vol s, MOD A4C: 67.8 ml  LV Vol s, MOD BP:  72.9 ml  LVOT SV MOD BP:    43.8 ml  LV EF% MOD BP:     38 %     MV E Vmax:    0.89 m/s  MV A Vmax:    1.22 m/s  MV E/A:       0.73  e' lateral:   12.60 cm/s  e' medial:    11.60 cm/s  E/e' lateral: 7.10  E/e' medial:  7.71  E/e' Average: 7.39  MV DT:        207 msec       Left Atrium Measurements     LA Diam 2D: 4.20 cm    Right Ventricle Measurements     TAPSE:            2.7 cm  TV Preeti. S':       15.50 cm/s  RV Base (RVID1):  3.5 cm  RV Mid (RVID2):   3.1 cm  RV Major (RVID3): 8.3 cm       LVOT / RVOT/ Qp/Qs Data:  LVOT Diameter: 1.90 cm  LVOT Vmax:     0.83 m/s  LVOT VTI:      17.50 cm  LVOT SV:       49.6 ml    Aortic Valve Measurements     AV Vmax:          380.0 cm/s  AV Peak Gradient: 57.8 mmHg  AV Mean Gradient: 31.0 mmHg  AVVTI:           77.6 cm  AV VTI Ratio:     0.23  AoV EOA, Contin:  0.64 cm²    Mitral Valve Measurements     MV E Vmax: 0.9 m/s  MV A Vmax: 1.2 m/s  MV E/A:    0.7       Tricuspid Valve Measurements     RA Pressure: 3 mmHg    ________________________________________________________________________________________  Diagnosing Physician: Skylar Hernández MD.  Electronically signed on 6/7/2023 at 6:14:52 PM by Skylar Hernández MD         *** Final ***

## 2024-03-31 NOTE — PROGRESS NOTE ADULT - PROBLEM SELECTOR PLAN 1
- Pt reports poor fluid control at home  - C/w IV Lasix 60 mg BID, hold home eplerenone, torsemide  - C/w home Toprol  - Fluid restrict 1500 mL  - No events on tele, d/c'ed  - Ordered TTE, will follow up with results.  - LBBB on EKG is chronic and known, as per jarad pt had a left heart cath in 2022 which was benign, will c/w home ASA and statin on this admit.   - Cardio Dr. Lawrence group following

## 2024-03-31 NOTE — CARE COORDINATION ASSESSMENT. - NSDCPLANSERVICES_GEN_ALL_CORE
B 12 injection given. Patient aware to return to clinic in july for next injection. Patient tolerated without incident. See MAR for documentation.    
Pt declined home care services, stated she does not need a visiting nurse/Home Care

## 2024-03-31 NOTE — DIETITIAN NUTRITION RISK NOTIFICATION - TREATMENT: THE FOLLOWING DIET HAS BEEN RECOMMENDED
Diet, DASH/TLC:   Sodium & Cholesterol Restricted  Consistent Carbohydrate {No Snacks}  1500mL Fluid Restriction ([id number]) (03-29-24 @ 20:20) [Active]

## 2024-03-31 NOTE — PROGRESS NOTE ADULT - SUBJECTIVE AND OBJECTIVE BOX
Patient is a 73y old  Female who presents with a chief complaint of Acute on chronic systolic CHF (30 Mar 2024 09:10)       HPI:  73F with a PMHx of Chronic Systolic CHF (last TTE with EF 38%), moderate aortic stenosis, known LBBB, HTN, DM2, CKD, hypothyroidism, and chronic lymphedema presents to the ED with shortness of breath over the past two weeks which has been acutely worsening. Pt states she is compliant with her medications but has not be as compliant with monitoring her fluid intake. Currently she denies chest pain, dizziness, abdominal pain, nausea, vomiting, diarrhea, fever, or chills. (29 Mar 2024 21:19)  SOB improving.  Renal consulted for DUNCAN on CKD.  Sees Dr. MCCRARY in office.  Urinating well with diuretic.        Breathing improving. NO N/V    PAST MEDICAL & SURGICAL HISTORY:  Hypertension      Diabetes      Lymphedema      H/O left bundle branch block      History of left bundle branch block (LBBB)      Systolic heart failure, chronic      H/O cataract  2020      History of surgical removal of meniscus of knee  left in 1971      Frozen shoulder  2000      H/O Achilles tendon repair  lengthened bilaterally, 2000      Fractured skull  1968           FAMILY HISTORY:  Family history of CVA  mom, age 57    [place]    Social History:Non smoker    MEDICATIONS  (STANDING):  aspirin  chewable 81 milliGRAM(s) Oral daily  atorvastatin 80 milliGRAM(s) Oral at bedtime  dextrose 5%. 1000 milliLiter(s) (100 mL/Hr) IV Continuous <Continuous>  dextrose 5%. 1000 milliLiter(s) (50 mL/Hr) IV Continuous <Continuous>  dextrose 50% Injectable 12.5 Gram(s) IV Push once  dextrose 50% Injectable 25 Gram(s) IV Push once  dextrose 50% Injectable 25 Gram(s) IV Push once  ferrous    sulfate 325 milliGRAM(s) Oral two times a day  furosemide   Injectable 60 milliGRAM(s) IV Push two times a day  glucagon  Injectable 1 milliGRAM(s) IntraMuscular once  heparin   Injectable 5000 Unit(s) SubCutaneous every 12 hours  insulin glargine Injectable (LANTUS) 20 Unit(s) SubCutaneous at bedtime  insulin lispro (ADMELOG) corrective regimen sliding scale   SubCutaneous three times a day before meals  levothyroxine 75 MICROGram(s) Oral daily  metoprolol succinate  milliGRAM(s) Oral daily  nystatin Powder 1 Application(s) Topical two times a day  pantoprazole    Tablet 40 milliGRAM(s) Oral before breakfast    MEDICATIONS  (PRN):  dextrose Oral Gel 15 Gram(s) Oral once PRN Blood Glucose LESS THAN 70 milliGRAM(s)/deciliter   Meds reviewed    Allergies    Ativan (Rash; Urticaria)  penicillins (Hives)    Intolerances         REVIEW OF SYSTEMS:    as above      ICU Vital Signs Last 24 Hrs  T(C): 36.6 (31 Mar 2024 04:05), Max: 36.7 (30 Mar 2024 20:00)  T(F): 97.9 (31 Mar 2024 04:05), Max: 98 (30 Mar 2024 20:00)  HR: 61 (31 Mar 2024 04:05) (57 - 65)  BP: 132/67 (31 Mar 2024 04:05) (113/54 - 132/67)  BP(mean): --  ABP: --  ABP(mean): --  RR: 19 (31 Mar 2024 04:05) (18 - 19)  SpO2: 91% (31 Mar 2024 04:05) (91% - 91%)    O2 Parameters below as of 31 Mar 2024 04:05  Patient On (Oxygen Delivery Method): room air            PHYSICAL EXAM:    GENERAL: NAD  HEAD:  Atraumatic, Normocephalic  EYES: EOMI, conjunctiva and sclera clear  ENMT: No Drainage from nares, No drainage from ears  NERVOUS SYSTEM:  Awake and Alert  CHEST/LUNG: Decreased  EXTREMITIES:  ++Edema  SKIN: No rashes No obvious ecchymosis      LABS:                                   11.6   5.48  )-----------( 174      ( 31 Mar 2024 09:00 )             37.9     03-31    142  |  104  |  51<H>  ----------------------------<  124<H>  3.9   |  29  |  2.30<H>    Ca    8.4<L>      31 Mar 2024 09:00  Phos  3.5     03-31  Mg     2.4     03-31    TPro  7.2  /  Alb  3.3  /  TBili  0.9  /  DBili  x   /  AST  17  /  ALT  13  /  AlkPhos  67  03-30    PT/INR - ( 29 Mar 2024 18:40 )   PT: 13.0 sec;   INR: 1.11 ratio         PTT - ( 29 Mar 2024 18:40 )  PTT:32.1 sec  Urinalysis Basic - ( 31 Mar 2024 09:00 )    Color: x / Appearance: x / SG: x / pH: x  Gluc: 124 mg/dL / Ketone: x  / Bili: x / Urobili: x   Blood: x / Protein: x / Nitrite: x   Leuk Esterase: x / RBC: x / WBC x   Sq Epi: x / Non Sq Epi: x / Bacteria: x                RADIOLOGY & ADDITIONAL TESTS:

## 2024-03-31 NOTE — PROGRESS NOTE ADULT - SUBJECTIVE AND OBJECTIVE BOX
Patient is a 73y old  Female who presents with a chief complaint of Acute on chronic systolic CHF (31 Mar 2024 11:07)      INTERVAL HPI/OVERNIGHT EVENTS: No acute overnight events. Pt was seen and examined at bedside. Pt states that her sob has completely resolved, was able to move about in the bed without sob, and leg swelling has diminished somewhat. Expressed she needs to have Iron supplement timed at least 4 hours after the levothyroxine per pharmacy, upon further review of chart these medications are timed appropriately and 4 hrs apart from one another.  Pt denies headache, dizziness, lightheadedness, fever, chills, body aches, CP, SOB, palpitations, abdominal pain, n/v, numbness/tingling.  No other complaints at this time.     MEDICATIONS  (STANDING):  aspirin  chewable 81 milliGRAM(s) Oral daily  atorvastatin 80 milliGRAM(s) Oral at bedtime  dextrose 5%. 1000 milliLiter(s) (100 mL/Hr) IV Continuous <Continuous>  dextrose 5%. 1000 milliLiter(s) (50 mL/Hr) IV Continuous <Continuous>  dextrose 50% Injectable 12.5 Gram(s) IV Push once  dextrose 50% Injectable 25 Gram(s) IV Push once  dextrose 50% Injectable 25 Gram(s) IV Push once  ferrous    sulfate 325 milliGRAM(s) Oral two times a day  furosemide   Injectable 60 milliGRAM(s) IV Push two times a day  glucagon  Injectable 1 milliGRAM(s) IntraMuscular once  heparin   Injectable 5000 Unit(s) SubCutaneous every 12 hours  insulin glargine Injectable (LANTUS) 20 Unit(s) SubCutaneous at bedtime  insulin lispro (ADMELOG) corrective regimen sliding scale   SubCutaneous three times a day before meals  levothyroxine 75 MICROGram(s) Oral daily  metoprolol succinate  milliGRAM(s) Oral daily  nystatin Powder 1 Application(s) Topical two times a day  pantoprazole    Tablet 40 milliGRAM(s) Oral before breakfast    MEDICATIONS  (PRN):  dextrose Oral Gel 15 Gram(s) Oral once PRN Blood Glucose LESS THAN 70 milliGRAM(s)/deciliter      Allergies    Ativan (Rash; Urticaria)  penicillins (Hives)    Intolerances        REVIEW OF SYSTEMS:  CONSTITUTIONAL: No fever or chills  HEENT:  No headache, no sore throat  RESPIRATORY: No cough, wheezing, or shortness of breath  CARDIOVASCULAR: No chest pain, palpitations  GASTROINTESTINAL: No abd pain, nausea, vomiting, or diarrhea  GENITOURINARY: No dysuria, frequency, or hematuria  NEUROLOGICAL: no focal weakness or dizziness  MUSCULOSKELETAL: no myalgias     Vital Signs Last 24 Hrs  T(C): 36.6 (31 Mar 2024 04:05), Max: 36.7 (30 Mar 2024 20:00)  T(F): 97.9 (31 Mar 2024 04:05), Max: 98 (30 Mar 2024 20:00)  HR: 61 (31 Mar 2024 04:05) (57 - 65)  BP: 132/67 (31 Mar 2024 04:05) (113/54 - 132/67)  BP(mean): --  RR: 19 (31 Mar 2024 04:05) (18 - 19)  SpO2: 91% (31 Mar 2024 04:05) (91% - 91%)    Parameters below as of 31 Mar 2024 04:05  Patient On (Oxygen Delivery Method): room air        PHYSICAL EXAM:  GENERAL: NAD  HEENT:  anicteric, moist mucous membranes  CHEST/LUNG:  CTA b/l, no rales, wheezes, or rhonchi  HEART:  RRR, loud whooshing pansystolic murmur heard in all areas, grade 5/6, unable to appreciate S1/S2  ABDOMEN:  obese, BS+, soft, nontender, nondistended  EXTREMITIES: Severe, 4+ pitting edema in B/L LE with multiple excoriations, venous stasis dermatitis in BLLE  NERVOUS SYSTEM: AAO x3. Answers questions and follows commands appropriately    LABS:                        11.6   5.48  )-----------( 174      ( 31 Mar 2024 09:00 )             37.9     CBC Full  -  ( 31 Mar 2024 09:00 )  WBC Count : 5.48 K/uL  Hemoglobin : 11.6 g/dL  Hematocrit : 37.9 %  Platelet Count - Automated : 174 K/uL  Mean Cell Volume : 90.7 fl  Mean Cell Hemoglobin : 27.8 pg  Mean Cell Hemoglobin Concentration : 30.6 gm/dL  Auto Neutrophil # : x  Auto Lymphocyte # : x  Auto Monocyte # : x  Auto Eosinophil # : x  Auto Basophil # : x  Auto Neutrophil % : x  Auto Lymphocyte % : x  Auto Monocyte % : x  Auto Eosinophil % : x  Auto Basophil % : x    31 Mar 2024 09:00    142    |  104    |  51     ----------------------------<  124    3.9     |  29     |  2.30     Ca    8.4        31 Mar 2024 09:00  Phos  3.5       31 Mar 2024 09:00  Mg     2.4       31 Mar 2024 09:00      PT/INR - ( 29 Mar 2024 18:40 )   PT: 13.0 sec;   INR: 1.11 ratio         PTT - ( 29 Mar 2024 18:40 )  PTT:32.1 sec  Urinalysis Basic - ( 31 Mar 2024 09:00 )    Color: x / Appearance: x / SG: x / pH: x  Gluc: 124 mg/dL / Ketone: x  / Bili: x / Urobili: x   Blood: x / Protein: x / Nitrite: x   Leuk Esterase: x / RBC: x / WBC x   Sq Epi: x / Non Sq Epi: x / Bacteria: x      CAPILLARY BLOOD GLUCOSE      POCT Blood Glucose.: 114 mg/dL (31 Mar 2024 11:42)  POCT Blood Glucose.: 96 mg/dL (31 Mar 2024 08:04)  POCT Blood Glucose.: 162 mg/dL (30 Mar 2024 21:31)  POCT Blood Glucose.: 158 mg/dL (30 Mar 2024 16:47)  POCT Blood Glucose.: 189 mg/dL (30 Mar 2024 12:02)          RADIOLOGY & ADDITIONAL TESTS:     Consultant(s) Notes Reviewed:  [x] YES  [ ] NO

## 2024-03-31 NOTE — DIETITIAN INITIAL EVALUATION ADULT - ORAL INTAKE PTA/DIET HISTORY
patient reports with good PO intake ate all of breakfast. patient refusing education on consistent cho and low Na diet at this time. states previously educated.   reviewed verbally whole grains and fresh foods for Na intake and reading labels. no food allergies. no difficulties chewing swallowing  a1c 7.4% on novolog and lantus PTA

## 2024-03-31 NOTE — DIETITIAN INITIAL EVALUATION ADULT - OTHER INFO
Reason for Admission: Acute on chronic systolic CHF  History of Present Illness:   73F with a PMHx of Chronic Systolic CHF (last TTE with EF 38%), moderate aortic stenosis, known LBBB, HTN, DM2, CKD, hypothyroidism, and chronic lymphedema presents to the ED with shortness of breath over the past two weeks which has been acutely worsening. Pt states she is compliant with her medications but has not be as compliant with monitoring her fluid intake.  weight 330# this admit stable per patient noted January admit wt 319# noted + edema

## 2024-03-31 NOTE — PROGRESS NOTE ADULT - PROBLEM SELECTOR PLAN 2
- DUNCAN on CKD on admission (2.7 on ADM, baseline 2.4)  - Resolved, acutely requiring IV Diuresis  - Monitor daily chemistry  - Nephro Dr. Edwards following

## 2024-03-31 NOTE — PROGRESS NOTE ADULT - ASSESSMENT
74 y/o F with Systolic HF (EF 38%), mod AS, HTN, T2DM, LBBB, CKD, and lymphedema presented to the ED c/o SOB, CURTIS, orthopnea, and edema for >2 weeks.  States, she has been compliant with her diuretic.  However, admits to be drinking fluids very judiciously (at least 3L/day). Admitted with acute on chr CHF exacerbation. Follows with Dr. Luong    Acute on chronic Systolic HF (EF 38%), mod AS, HTN, LBBB, Lymphedema   - Has known systolic HF, EF 38% (2023), mod AS.    - On home Torsemide 20 mg daily, Eplerenone 25 mg daily, and Toprol  mg daily  - Compliant with all meds including Torsemide, though, it appears that she has been drinking fluids excessively  - BNP:  <--10706  - Hold home Torsemide and home Eplerenone for now, torsemide dosing had been more frequent prior to the last admission, now once daily  - Continue Lasix IV 60 mg q12H  - Creatinine:  <--2.50,  <--2.70  - Continue home Toprol XL  - Fluid restriction of 1.5L/day  - Strict I/O's and daily weights.  Patient has known lymphedema  - Please obtain transthoracic echocardiogram to assess ventricular function, wall motion and valvular abnormalities.     - Has a known LBBB  - Had LHC in 7/2022 showing non-obstructive CAD  - Troponin: <-22.2  - Continue ASA and statin  - Tele w/ SR 50-60s, no events, can d/c telemetry    - BP stable and controlled     - Monitor and replete lytes, keep K>4, Mg>2.  - Will continue to follow.    Jose E Felipe, MS FNP, AGACNP  Nurse Practitioner- Cardiology   Please call on TEAMS

## 2024-03-31 NOTE — PROGRESS NOTE ADULT - ASSESSMENT
DUNCAN on CKD 4  CHF  HTN  LE Edema    -Baseline Creatinine approx 2.4  -DUNCAN likely 2/2 to CRS  -Urine lytes will be less helpful in the setting of diuretic administration  -Will need to cont diuretic and monitor renal tolerance  -BP well controlled  -Creatinin improving and respiratory status improved

## 2024-03-31 NOTE — CARE COORDINATION ASSESSMENT. - NSCAREPROVIDERS_GEN_ALL_CORE_FT
CARE PROVIDERS:  Accepting Physician: Luis Enrique Luo  Administration: Ritesh Ryan  Admitting: Luis Enrique Luo  Attending: Luis Enrique Luo  Cardiology Technician: Hilary Pendleton  Consultant: Abdelrahman Babin  Consultant: Kira Zuniga  Consultant: Dale Lawrence  Consultant: Jose E Felipe  Consultant: Parmjit Edwards  ED Attending: Gunner Aparicio  ED Nurse: Stefan Vera  ED Nurse 2: Carley Hayes  Nurse: Alicia Spencer  Nurse: Rama Jack  Nurse: Radha San  Ordered: ServiceAccount, SCMMLM  Ordered: ADM, User  Ordered: Doctor, Unknown  Outpatient Provider: Julius Jimenez  Outpatient Provider: Alec Maynard  Override: Harris Cardona  PCA/Nursing Assistant: Adelso Solorzano  Primary Team: Trev Dickey  Primary Team: Libia Haley  Primary Team: Lian Bravo  Registered Dietitian: Cindy Ruiz  Respiratory Therapy: Alonzo Antoine  : Aurelio Leung

## 2024-03-31 NOTE — DIETITIAN INITIAL EVALUATION ADULT - SIGNS/SYMPTOMS
as evidenced by dx CHF with edema  as evidenced by BMI 46, estimated energy intake greater than needs

## 2024-03-31 NOTE — CARE COORDINATION ASSESSMENT. - OTHER PERTINENT DISCHARGE PLANNING INFORMATION:
CM met with the patient at the bedside, educated pt on role of CM and transition planning. Patient verbalized understanding. CM provided direct contact/resource folder and remains available. Patient is identified as a CMS STAR patient. Transition care management program explained and yellow contact card has been provided.  Pt resides in a house with her partner, has 2 steps to enter and none inside. Denies any prior home care services. Per patient she is independent with ambulating, ADL's and negotiating stairs. Pt has a walker, cane, shower chair and Skooter.  Pharmacy [facility].

## 2024-03-31 NOTE — DIETITIAN INITIAL EVALUATION ADULT - PERTINENT MEDS FT
MEDICATIONS  (STANDING):  aspirin  chewable 81 milliGRAM(s) Oral daily  atorvastatin 80 milliGRAM(s) Oral at bedtime  dextrose 5%. 1000 milliLiter(s) (100 mL/Hr) IV Continuous <Continuous>  dextrose 5%. 1000 milliLiter(s) (50 mL/Hr) IV Continuous <Continuous>  dextrose 50% Injectable 12.5 Gram(s) IV Push once  dextrose 50% Injectable 25 Gram(s) IV Push once  dextrose 50% Injectable 25 Gram(s) IV Push once  ferrous    sulfate 325 milliGRAM(s) Oral two times a day  furosemide   Injectable 60 milliGRAM(s) IV Push two times a day  glucagon  Injectable 1 milliGRAM(s) IntraMuscular once  heparin   Injectable 5000 Unit(s) SubCutaneous every 12 hours  insulin glargine Injectable (LANTUS) 20 Unit(s) SubCutaneous at bedtime  insulin lispro (ADMELOG) corrective regimen sliding scale   SubCutaneous three times a day before meals  levothyroxine 75 MICROGram(s) Oral daily  metoprolol succinate  milliGRAM(s) Oral daily  nystatin Powder 1 Application(s) Topical two times a day  pantoprazole    Tablet 40 milliGRAM(s) Oral before breakfast    MEDICATIONS  (PRN):  dextrose Oral Gel 15 Gram(s) Oral once PRN Blood Glucose LESS THAN 70 milliGRAM(s)/deciliter

## 2024-03-31 NOTE — DIETITIAN INITIAL EVALUATION ADULT - PERTINENT LABORATORY DATA
03-30    141  |  105  |  55<H>  ----------------------------<  140<H>  4.2   |  27  |  2.50<H>    Ca    8.3<L>      30 Mar 2024 08:21  Phos  3.7     03-30  Mg     2.4     03-30    TPro  7.2  /  Alb  3.3  /  TBili  0.9  /  DBili  x   /  AST  17  /  ALT  13  /  AlkPhos  67  03-30  POCT Blood Glucose.: 96 mg/dL (03-31-24 @ 08:04)  A1C with Estimated Average Glucose Result: 7.4 % (03-30-24 @ 08:21)  A1C with Estimated Average Glucose Result: 6.8 % (01-10-24 @ 08:37)  A1C with Estimated Average Glucose Result: 7.3 % (08-12-23 @ 07:58)

## 2024-03-31 NOTE — DIETITIAN INITIAL EVALUATION ADULT - PROBLEM/PLAN-6
-- DO NOT REPLY / DO NOT REPLY ALL --  -- Message is from the [facility]--    General Patient Message      Reason for Call: Patient needs : Sami for the upcoming appt.     Caller Information       Type Contact Phone    02/24/2022 04:19 PM CST Phone (Incoming) Elfego Posey (Mother) [phone]          Alternative phone number: none        Did the caller agree that this message can wait until the office reopens in the morning? YES - The Message Can Wait      Send a message to the provider’s clinical support pool.     Turnaround time given to caller:   \"This message will be sent to [state Provider's name]. The clinical team will fulfill your request as soon as they review your message when the office opens tomorrow.\"         DISPLAY PLAN FREE TEXT

## 2024-04-01 NOTE — CONSULT NOTE ADULT - PROBLEM SELECTOR RECOMMENDATION 9
Type 1 A1c 7.4% adm  Recommend endocrine-Perlman onconsult  FU appt: TBA  DSC recommendations: return to home regimen and glucose monitoring  diabetes education provided  Diabetes support info and cell # [phone] given   Goal 100-180 mg/dL; 140-180 mg/dL in critical care areas Type 1 A1c 7.4% adm HF  Recommend endocrine-Perlman on consult  lantus 15 units HS; cont low scale  FU appt: TBA  DSC recommendations: return to home lantus 37 units HS and low corrective scale regimen and CGM/glucose monitoring  diabetes education provided  Diabetes support info and cell # [phone] given   Goal 100-180 mg/dL; 140-180 mg/dL in critical care areas

## 2024-04-01 NOTE — DISCHARGE NOTE PROVIDER - HOSPITAL COURSE
FROM ADMISSION H+P:   HPI:  73F with a PMHx of Chronic Systolic CHF (last TTE with EF 38%), moderate aortic stenosis, known LBBB, HTN, DM1, CKD, hypothyroidism, and chronic lymphedema presents to the ED with shortness of breath over the past two weeks which has been acutely worsening. Pt states she is compliant with her medications but has not be as compliant with monitoring her fluid intake. Currently she denies chest pain, dizziness, abdominal pain, nausea, vomiting, diarrhea, fever, or chills. (29 Mar 2024 21:19)      ---  HOSPITAL COURSE/PERTINENT LABS/PROCEDURES PERFORMED/PENDING TESTS:  Presenting for shortness of breath due to medication noncompliance with heart failure medications. Cardiology consulted and pt started on IV lasix and fluid restriction. Nephrology consulted given DUNCAN on CKD with need for continuous diuretics. TTE reveal LVEF 30% with mild to moderate LVH, trace AR, moderate to severe AS, moderate pulmonary htn, dilated IVC.     Patient continued to improve throughout course and was medically optimized for discharge***.       ---  PATIENT CONDITION:  - stable    ---  PHYSICAL EXAM ON DAY OF DISCHARGE:    ---  CONSULTANTS:   Cardiology: Dr. Lawrence     ---  ADVANCED CARE PLANNING:  - Code status:    Full Code   - MOLST completed:      [ X ] NO     [  ] YES    ---  TIME SPENT:  I, the attending physician, was physically present for the key portions of the evaluation and management (E/M) service provided. The total amount of time spent reviewing the hospital notes, laboratory values, imaging findings, assessing/counseling the patient, discussing with consultant physicians, social work, nursing staff was -- minutes FROM ADMISSION H+P:   HPI:  73F with a PMHx of Chronic Systolic CHF (last TTE with EF 38%), moderate aortic stenosis, known LBBB, HTN, DM1, CKD, hypothyroidism, and chronic lymphedema presents to the ED with shortness of breath over the past two weeks which has been acutely worsening. Pt states she is compliant with her medications but has not be as compliant with monitoring her fluid intake. Currently she denies chest pain, dizziness, abdominal pain, nausea, vomiting, diarrhea, fever, or chills. (29 Mar 2024 21:19)      ---  HOSPITAL COURSE/PERTINENT LABS/PROCEDURES PERFORMED/PENDING TESTS:  Presenting for shortness of breath due to a CHF exacerbation. Cardiology consulted and pt started on IV lasix and fluid restriction. Nephrology consulted given DUNCAN on CKD with need for continuous diuretics. TTE reveal LVEF 30% with mild to moderate LVH, trace AR, moderate to severe AS, moderate pulmonary htn, dilated IVC. Weaned off supplemental O2 to room air without incident of acute desaturation.     Patient continued to improve throughout course and was medically optimized for discharge***.       ---  PATIENT CONDITION:  - stable    ---  PHYSICAL EXAM ON DAY OF DISCHARGE:    ---  CONSULTANTS:   Cardiology: Dr. Lawrence     ---  ADVANCED CARE PLANNING:  - Code status:    Full Code   - MOLST completed:      [ X ] NO     [  ] YES    ---  TIME SPENT:  I, the attending physician, was physically present for the key portions of the evaluation and management (E/M) service provided. The total amount of time spent reviewing the hospital notes, laboratory values, imaging findings, assessing/counseling the patient, discussing with consultant physicians, social work, nursing staff was -- minutes FROM ADMISSION H+P:   HPI:  73F with a PMHx of Chronic Systolic CHF (last TTE with EF 38%), moderate aortic stenosis, known LBBB, HTN, DM1, CKD, hypothyroidism, and chronic lymphedema presents to the ED with shortness of breath over the past two weeks which has been acutely worsening. Pt states she is compliant with her medications but has not be as compliant with monitoring her fluid intake. Currently she denies chest pain, dizziness, abdominal pain, nausea, vomiting, diarrhea, fever, or chills. (29 Mar 2024 21:19)      ---  HOSPITAL COURSE/PERTINENT LABS/PROCEDURES PERFORMED/PENDING TESTS:  Presenting for shortness of breath due to a CHF exacerbation. Cardiology consulted and pt started on IV lasix and fluid restriction. Nephrology consulted given DUNCAN on CKD with need for continuous diuretics. TTE reveal LVEF 30% with mild to moderate LVH, trace AR, moderate to severe AS, moderate pulmonary htn, dilated IVC. This is a decline from previous TTE with 38% LVEF; moderate AS. Failed weaning off supplemental oxygen, with SpO2 78% on RA on attempts to wean. Will require home O2 on discharge. Will require outpatient testing for OHS/LELIA.    Fluid overload status managed with IV Lasix, weaned to PO Torsemide. Patient to follow up outpatient regarding the resumption of previous home med Eplerenone.    Patient continued to improve throughout course and was medically optimized for discharge home.      ---  PATIENT CONDITION:  - stable    ---  PHYSICAL EXAM ON DAY OF DISCHARGE:    T(C): 36.3 (04-05-24 @ 12:06), Max: 36.5 (04-04-24 @ 20:11)  HR: 58 (04-05-24 @ 12:06) (56 - 60)  BP: 111/60 (04-05-24 @ 04:33) (109/64 - 116/71)  RR: 18 (04-05-24 @ 12:06) (17 - 18)  SpO2: 98% (04-05-24 @ 12:06) (97% - 98%)    GENERAL: patient appears well, no acute distress, appropriately interactive  EYES: sclera clear, no exudates  ENMT: oropharynx clear without erythema, no exudates, moist mucous membranes  LUNGS: difficult auscultation d/t body habitus - Lungs CTA B/L  HEART:  RRR, pansystolic murmur obscuring S1 and S2 in all areas, loudest at aortic area  ABDOMEN:  obese abdomen, soft, NTND x4 quadrants  EXTREMITIES: +Lymphedema with pitting, diffuse excoriations and xerosis in BLLE  NERVOUS SYSTEM: AAO x3; answers questions and follows commands appropriately    ---  CONSULTANTS:   Cardiology: Dr. Lawrence (group)  Pulmonology: Dr. Duarte  Nephrology: Dr. Edwards    ---  ADVANCED CARE PLANNING:  - Code status:    Full Code   - MOLST completed:      [ X ] NO     [  ] YES    ---  TIME SPENT:  I, the attending physician, was physically present for the key portions of the evaluation and management (E/M) service provided. The total amount of time spent reviewing the hospital notes, laboratory values, imaging findings, assessing/counseling the patient, discussing with consultant physicians, social work, nursing staff was -- minutes FROM ADMISSION H+P:   HPI:  73F with a PMHx of Chronic Systolic CHF (last TTE with EF 38%), moderate aortic stenosis, known LBBB, HTN, DM1, CKD, hypothyroidism, and chronic lymphedema presents to the ED with shortness of breath over the past two weeks which has been acutely worsening. Pt states she is compliant with her medications but has not be as compliant with monitoring her fluid intake. Currently she denies chest pain, dizziness, abdominal pain, nausea, vomiting, diarrhea, fever, or chills. (29 Mar 2024 21:19)      ---  HOSPITAL COURSE/PERTINENT LABS/PROCEDURES PERFORMED/PENDING TESTS:  Presenting for shortness of breath due to a CHF exacerbation. Cardiology consulted and pt started on IV lasix and fluid restriction. Nephrology consulted given DUNCAN on CKD with need for continuous diuretics. TTE reveal LVEF 30% with mild to moderate LVH, trace AR, moderate to severe AS, moderate pulmonary htn, dilated IVC. This is a decline from previous TTE with 38% LVEF; moderate AS. Failed weaning off supplemental oxygen, with SpO2 78% on RA on attempts to wean. Will require home O2 on discharge. Will require outpatient testing for OHS/LELIA.    Fluid overload status managed with IV Lasix, weaned to PO Torsemide. Patient to follow up outpatient regarding the resumption of previous home med Eplerenone.    Patient continued to improve throughout course and was medically optimized for discharge home.      ---  PATIENT CONDITION:  - stable    ---  PHYSICAL EXAM ON DAY OF DISCHARGE:    T(C): 36.3 (04-05-24 @ 12:06), Max: 36.5 (04-04-24 @ 20:11)  HR: 58 (04-05-24 @ 12:06) (56 - 60)  BP: 111/60 (04-05-24 @ 04:33) (109/64 - 116/71)  RR: 18 (04-05-24 @ 12:06) (17 - 18)  SpO2: 98% (04-05-24 @ 12:06) (97% - 98%)    GENERAL: patient appears well, no acute distress, appropriately interactive  EYES: sclera clear, no exudates  ENMT: oropharynx clear without erythema, no exudates, moist mucous membranes  LUNGS: difficult auscultation d/t body habitus - Lungs CTA B/L  HEART:  RRR, pansystolic murmur obscuring S1 and S2 in all areas, loudest at aortic area  ABDOMEN:  obese abdomen, soft, NTND x4 quadrants  EXTREMITIES: +Lymphedema with pitting, diffuse excoriations and xerosis in BLLE  NERVOUS SYSTEM: AAO x3; answers questions and follows commands appropriately    ---  CONSULTANTS:   Cardiology: Dr. Lawrence (group)  Pulmonology: Dr. Duarte  Nephrology: Dr. Edwards    ---  ADVANCED CARE PLANNING:  - Code status:    Full Code   - MOLST completed:      [ X ] NO     [  ] YES

## 2024-04-01 NOTE — PHYSICAL THERAPY INITIAL EVALUATION ADULT - GENERAL OBSERVATIONS, REHAB EVAL
Patient chart reviewed, events noted. Patient cleared to be seen for therapy by RN prior to session. Patient received semi-reclined in bed +NC at 1 L/min O2, +purewick +IV not attached to IV pole +remote tele, in NAD. Pt agreeable to PT at this time.

## 2024-04-01 NOTE — PROGRESS NOTE ADULT - PROBLEM SELECTOR PLAN 4
- c/w Toprol XL 100mg daily - Pt is on Lantus 37U at bedtime at home  - Lantus 15U qHS, ISS  - Well-controlled inpatient; A1C 6.8  - Monitor fingersticks qAC&HS

## 2024-04-01 NOTE — DISCHARGE NOTE PROVIDER - NSDCCPCAREPLAN_GEN_ALL_CORE_FT
PRINCIPAL DISCHARGE DIAGNOSIS  Diagnosis: Acute exacerbation of congestive heart failure  Assessment and Plan of Treatment: You presented to the ED with several weeks of worsening shortnses of breath. You were started on IV Lasix while admitted and placed on a fluid restriciton. Echocardiogram was repeated and revealed a decrease in cardiac function from your prior studies. Your volume status improved and as did your respiratory status.   -- Please continue Torsemide 20mg once per day   -- Please continue Eplereone 25 mg once per day   Please follow up with your outpatient Cardiologist within 1 week of discharge for continued management of your Heart Failure.     PRINCIPAL DISCHARGE DIAGNOSIS  Diagnosis: Acute exacerbation of congestive heart failure  Assessment and Plan of Treatment: You presented to the ED with several weeks of worsening shortnses of breath. You were started on IV Lasix while admitted and placed on a fluid restriciton. Echocardiogram was repeated and revealed a decrease in cardiac function from your prior studies. Your volume status improved and as did your respiratory status.   -- Please START Torsemide 40mg once per day. This is an INCREASE from your usual dosage.  -- Please STOP taking [facility]plereone, [facility]  -- Please START taking Farxiga 5mg once per day.  Please follow up with your outpatient Cardiologist within 1 week of discharge for continued management of your Heart Failure.      SECONDARY DISCHARGE DIAGNOSES  Diagnosis: Obstructive lung disease  Assessment and Plan of Treatment: You were found to have decreased lung function, to the point where you required supplemental oxygen at rest. You are being discharged on Home O2. Please use it daily as instructed.     PRINCIPAL DISCHARGE DIAGNOSIS  Diagnosis: Acute exacerbation of congestive heart failure  Assessment and Plan of Treatment: You presented to the ED with several weeks of worsening shortnses of breath. You were started on IV Lasix while admitted and placed on a fluid restriciton. Echocardiogram was repeated and revealed a decrease in cardiac function from your prior studies. Your volume status improved and as did your respiratory status.   -- Please START Torsemide 40mg once per day. This is an INCREASE from your usual dosage.  -- Please STOP taking Eplereone, pending further advise from your outpatient cardiologist  -- Please START taking Farxiga 5mg once per day.  Please follow up with your outpatient Cardiologist within 1 week of discharge for continued management of your Heart Failure.      SECONDARY DISCHARGE DIAGNOSES  Diagnosis: Obstructive lung disease  Assessment and Plan of Treatment: You were found to have decreased lung function, to the point where you required supplemental oxygen at rest. You are being discharged on Home O2. Please use it daily as instructed.    Diagnosis: Diabetes mellitus  Assessment and Plan of Treatment: continue you insulin as directed. follow up with your PCP as outpatient. Continue a consistent carbohydrate diet

## 2024-04-01 NOTE — CONSULT NOTE ADULT - CONSULT REASON
DUNCAN
Volume Overload
73y A1C with Estimated Average Glucose Result: 7.4 % (03-30-24 @ 08:21)  A1C with Estimated Average Glucose Result: 6.8 % (01-10-24 @ 08:37)   diabetes mellitus uncontrolled type 1
CHF  Dyspnea

## 2024-04-01 NOTE — PHYSICAL THERAPY INITIAL EVALUATION ADULT - FOLLOWS COMMANDS/ANSWERS QUESTIONS, REHAB EVAL
[de-identified] : 37 y/o female presents with multiple issues that are worsening.\par C/o the following: "Lose balance", intermittent substernal "Chest pain." "Tired"."Swollen" but only on the L side (arm and leg) and not currently. Back pain. HAs.\par No dynspnea. NO dizziness. \par I referred her to Neurology but appointment isnt' for a month.\par She was here last month and labs revealed extremely high ferritin level. Has appointment with HEME in 2 weeks. \par \par \par \par 
100% of the time

## 2024-04-01 NOTE — CONSULT NOTE ADULT - SUBJECTIVE AND OBJECTIVE BOX
Patient is a 73y old  Female who presents with a chief complaint of Acute on chronic systolic CHF (01 Apr 2024 11:52)    Type: DX year known complications Endocrine Last seen Rx home Hx DKA/HHS, Glucometer checks, needs, weight, diet, exercise  diabetes education provided  Hx ASCVD, CKD, HF    HPI:  73F with a PMHx of Chronic Systolic CHF (last TTE with EF 38%), moderate aortic stenosis, known LBBB, HTN, DM1, CKD, hypothyroidism, and chronic lymphedema presents to the ED with shortness of breath over the past two weeks which has been acutely worsening. Pt states she is compliant with her medications but has not be as compliant with monitoring her fluid intake. Currently she denies chest pain, dizziness, abdominal pain, nausea, vomiting, diarrhea, fever, or chills. (29 Mar 2024 21:19)      PAST MEDICAL & SURGICAL HISTORY:  Hypertension      Diabetes      Lymphedema      H/O left bundle branch block      History of left bundle branch block (LBBB)      Systolic heart failure, chronic      H/O cataract  2020      History of surgical removal of meniscus of knee  left in 1971      Frozen shoulder  2000      H/O Achilles tendon repair  lengthened bilaterally, 2000      Fractured skull  1968          Allergies    Ativan (Rash; Urticaria)  penicillins (Hives)    Intolerances        MEDICATIONS  (STANDING):  aspirin  chewable 81 milliGRAM(s) Oral daily  atorvastatin 80 milliGRAM(s) Oral at bedtime  dextrose 5%. 1000 milliLiter(s) (50 mL/Hr) IV Continuous <Continuous>  dextrose 5%. 1000 milliLiter(s) (100 mL/Hr) IV Continuous <Continuous>  dextrose 50% Injectable 25 Gram(s) IV Push once  dextrose 50% Injectable 12.5 Gram(s) IV Push once  dextrose 50% Injectable 25 Gram(s) IV Push once  ferrous    sulfate 325 milliGRAM(s) Oral two times a day  furosemide   Injectable 80 milliGRAM(s) IV Push every 12 hours  glucagon  Injectable 1 milliGRAM(s) IntraMuscular once  heparin   Injectable 5000 Unit(s) SubCutaneous every 12 hours  insulin glargine Injectable (LANTUS) 15 Unit(s) SubCutaneous at bedtime  insulin lispro (ADMELOG) corrective regimen sliding scale   SubCutaneous three times a day before meals  levothyroxine 75 MICROGram(s) Oral daily  metoprolol succinate  milliGRAM(s) Oral daily  nystatin Powder 1 Application(s) Topical two times a day  pantoprazole    Tablet 40 milliGRAM(s) Oral before breakfast       Patient is a 73y old  Female who presents with a chief complaint of Acute on chronic systolic CHF (01 Apr 2024 11:52)    Type:1 DX  50 yrs ago at age of 26 years old. No known complications.  Endocrine- dr johnson  Last seen: jan 19 2023 and next appt April 30, 2023. Rx home: lantus 37 units HS; low corrective scale. No recent  Hx DKA/HHS, Glucometer checks- has but uses dexcom sensor., denies any needs, diabetes education provided- verbally and handouts.       HPI:  73F with a PMHx of Chronic Systolic CHF (last TTE with EF 38%), moderate aortic stenosis, known LBBB, HTN, DM1, CKD, hypothyroidism, and chronic lymphedema presents to the ED with shortness of breath over the past two weeks which has been acutely worsening. Pt states she is compliant with her medications but has not be as compliant with monitoring her fluid intake. Currently she denies chest pain, dizziness, abdominal pain, nausea, vomiting, diarrhea, fever, or chills. (29 Mar 2024 21:19)      PAST MEDICAL & SURGICAL HISTORY:  Hypertension      Diabetes      Lymphedema      H/O left bundle branch block      History of left bundle branch block (LBBB)      Systolic heart failure, chronic      H/O cataract  2020      History of surgical removal of meniscus of knee  left in 1971      Frozen shoulder  2000      H/O Achilles tendon repair  lengthened bilaterally, 2000      Fractured skull  1968          Allergies    Ativan (Rash; Urticaria)  penicillins (Hives)    Intolerances        MEDICATIONS  (STANDING):  aspirin  chewable 81 milliGRAM(s) Oral daily  atorvastatin 80 milliGRAM(s) Oral at bedtime  dextrose 5%. 1000 milliLiter(s) (50 mL/Hr) IV Continuous <Continuous>  dextrose 5%. 1000 milliLiter(s) (100 mL/Hr) IV Continuous <Continuous>  dextrose 50% Injectable 25 Gram(s) IV Push once  dextrose 50% Injectable 12.5 Gram(s) IV Push once  dextrose 50% Injectable 25 Gram(s) IV Push once  ferrous    sulfate 325 milliGRAM(s) Oral two times a day  furosemide   Injectable 80 milliGRAM(s) IV Push every 12 hours  glucagon  Injectable 1 milliGRAM(s) IntraMuscular once  heparin   Injectable 5000 Unit(s) SubCutaneous every 12 hours  insulin glargine Injectable (LANTUS) 15 Unit(s) SubCutaneous at bedtime  insulin lispro (ADMELOG) corrective regimen sliding scale   SubCutaneous three times a day before meals  levothyroxine 75 MICROGram(s) Oral daily  metoprolol succinate  milliGRAM(s) Oral daily  nystatin Powder 1 Application(s) Topical two times a day  pantoprazole    Tablet 40 milliGRAM(s) Oral before breakfast

## 2024-04-01 NOTE — PHYSICAL THERAPY INITIAL EVALUATION ADULT - PERTINENT HX OF CURRENT PROBLEM, REHAB EVAL
As per EMR "73F with a PMHx of Chronic Systolic CHF (last TTE with EF 38%), moderate aortic stenosis, known LBBB, HTN, DM2, CKD, hypothyroidism, and chronic lymphedema admitted with acute on chronic systolic CHF and DUNCAN on CKD."

## 2024-04-01 NOTE — PROGRESS NOTE ADULT - PROBLEM SELECTOR PLAN 3
- Pt is on Lantus 37U at bedtime at home  - Lantus 15U qHS, ISS  - Well-controlled inpatient; A1C 6.8  - Monitor fingersticks qAC&HS - DUNCAN on CKD on admission (2.7 on ADM, baseline 2.4) likely secondary to CRS  - Resolved, acutely requiring IV Diuresis - Lasix increased per cards  - Monitor daily chemistry  - Nephro Dr. Edwards following

## 2024-04-01 NOTE — CONSULT NOTE ADULT - ASSESSMENT
Physical Exam:   Vital Signs Last 24 Hrs  T(C): 36.9 (01 Apr 2024 12:19), Max: 36.9 (01 Apr 2024 12:19)  T(F): 98.5 (01 Apr 2024 12:19), Max: 98.5 (01 Apr 2024 12:19)  HR: 53 (01 Apr 2024 12:19) (53 - 59)  BP: 112/63 (01 Apr 2024 12:19) (109/63 - 116/68)  BP(mean): --  RR: 18 (01 Apr 2024 12:19) (18 - 18)  SpO2: 97% (01 Apr 2024 12:19) (92% - 97%)    Parameters below as of 01 Apr 2024 12:19  Patient On (Oxygen Delivery Method): nasal cannula  O2 Flow (L/min): 1           CAPILLARY BLOOD GLUCOSE      POCT Blood Glucose.: 144 mg/dL (01 Apr 2024 12:27)  POCT Blood Glucose.: 83 mg/dL (01 Apr 2024 08:00)  POCT Blood Glucose.: 163 mg/dL (31 Mar 2024 20:57)  POCT Blood Glucose.: 117 mg/dL (31 Mar 2024 16:55)      Cholesterol, Serum: 113 mg/dL (05.19.21 @ 08:36)     HDL Cholesterol, Serum: 22 mg/dL (05.19.21 @ 08:36)     LDL Cholesterol Calculated: 66 mg/dL (05.19.21 @ 08:36)     DIET: CC  >50%

## 2024-04-01 NOTE — PROGRESS NOTE ADULT - PROBLEM SELECTOR PLAN 1
- Acute hypoxemic respiratory failure 2/2 acute on chronic CHF exacerbation  - Pt reports poor fluid control at home  - IV Lasix increased to 80 mg BID as pt again requiring FiO2 overnight  - Hold home eplerenone, torsemide - consider restarting tomorrow after assessing volume status  - C/w home Toprol  - Fluid restrict 1500 mL  - No events on tele, d/c'ed  - F/u TTE  - LBBB on EKG is chronic and known, as per jarad pt had a left heart cath in 2022 which was benign, will c/w home ASA and statin on this admit.   - Cardio Dr. Lawrence group following - Acute on chronic systolic CHF exacerbation  - Pt reports poor fluid control at home  - IV Lasix increased to 80 mg BID as pt again requiring FiO2 overnight  - Hold home eplerenone - consider restarting tomorrow after assessing volume status  - C/w home Toprol XL for GDMT  - Fluid restrict 1500 mL  - No events on tele, d/c'ed  - TTE reviewed: EF 30%, LVH, mod-severe AS, moderate pulm HTN  - LBBB on EKG is chronic and known, as per jarad pt had a left heart cath in 2022 which was benign, will c/w home ASA and statin on this admit.   - Cardio Dr. Lawrence group following

## 2024-04-01 NOTE — DISCHARGE NOTE PROVIDER - CARE PROVIDER_API CALL
Julius Jimenez  Internal Medicine  [address] [place] [address], [address]  [place] [address]  Phone: [phone]  Fax: [phone]  Follow Up Time:    Julius Jimenez  Internal Medicine  [address] [place] [address], [address]  [place] [address]  Phone: [phone]  Fax: [phone]  Follow Up Time:     Ceci Kumar NP in Adult Health  [address] [place] [address]  Phone: [phone]  Fax: [phone]  Follow Up Time:

## 2024-04-01 NOTE — PROGRESS NOTE ADULT - SUBJECTIVE AND OBJECTIVE BOX
Patient is a 73y old  Female who presents with a chief complaint of Acute on chronic systolic CHF (30 Mar 2024 09:10)       HPI:  73F with a PMHx of Chronic Systolic CHF (last TTE with EF 38%), moderate aortic stenosis, known LBBB, HTN, DM2, CKD, hypothyroidism, and chronic lymphedema presents to the ED with shortness of breath over the past two weeks which has been acutely worsening. Pt states she is compliant with her medications but has not be as compliant with monitoring her fluid intake. Currently she denies chest pain, dizziness, abdominal pain, nausea, vomiting, diarrhea, fever, or chills. (29 Mar 2024 21:19)  SOB improving.  Renal consulted for DUNCAN on CKD.  Sees Dr. MCCRARY in office.  Urinating well with diuretic.        Breathing improving. NO N/V    PAST MEDICAL & SURGICAL HISTORY:  Hypertension      Diabetes      Lymphedema      H/O left bundle branch block      History of left bundle branch block (LBBB)      Systolic heart failure, chronic      H/O cataract  2020      History of surgical removal of meniscus of knee  left in 1971      Frozen shoulder  2000      H/O Achilles tendon repair  lengthened bilaterally, 2000      Fractured skull  1968           FAMILY HISTORY:  Family history of CVA  mom, age 57    [place]    Social History:Non smoker    MEDICATIONS  (STANDING):  aspirin  chewable 81 milliGRAM(s) Oral daily  atorvastatin 80 milliGRAM(s) Oral at bedtime  dextrose 5%. 1000 milliLiter(s) (100 mL/Hr) IV Continuous <Continuous>  dextrose 5%. 1000 milliLiter(s) (50 mL/Hr) IV Continuous <Continuous>  dextrose 50% Injectable 12.5 Gram(s) IV Push once  dextrose 50% Injectable 25 Gram(s) IV Push once  dextrose 50% Injectable 25 Gram(s) IV Push once  ferrous    sulfate 325 milliGRAM(s) Oral two times a day  furosemide   Injectable 60 milliGRAM(s) IV Push two times a day  glucagon  Injectable 1 milliGRAM(s) IntraMuscular once  heparin   Injectable 5000 Unit(s) SubCutaneous every 12 hours  insulin glargine Injectable (LANTUS) 20 Unit(s) SubCutaneous at bedtime  insulin lispro (ADMELOG) corrective regimen sliding scale   SubCutaneous three times a day before meals  levothyroxine 75 MICROGram(s) Oral daily  metoprolol succinate  milliGRAM(s) Oral daily  nystatin Powder 1 Application(s) Topical two times a day  pantoprazole    Tablet 40 milliGRAM(s) Oral before breakfast    MEDICATIONS  (PRN):  dextrose Oral Gel 15 Gram(s) Oral once PRN Blood Glucose LESS THAN 70 milliGRAM(s)/deciliter   Meds reviewed    Allergies    Ativan (Rash; Urticaria)  penicillins (Hives)    Intolerances         REVIEW OF SYSTEMS:    as above      ICU Vital Signs Last 24 Hrs  T(C): 36.3 (01 Apr 2024 04:40), Max: 36.6 (31 Mar 2024 20:38)  T(F): 97.4 (01 Apr 2024 04:40), Max: 97.8 (31 Mar 2024 20:38)  HR: 59 (01 Apr 2024 04:40) (57 - 65)  BP: 116/68 (01 Apr 2024 04:40) (109/63 - 143/68)  BP(mean): --  ABP: --  ABP(mean): --  RR: 18 (01 Apr 2024 04:40) (18 - 18)  SpO2: 92% (01 Apr 2024 04:40) (91% - 96%)    O2 Parameters below as of 01 Apr 2024 04:40  Patient On (Oxygen Delivery Method): nasal cannula  O2 Flow (L/min): 1            PHYSICAL EXAM:    GENERAL: NAD  HEAD:  Atraumatic, Normocephalic  EYES: EOMI, conjunctiva and sclera clear  ENMT: No Drainage from nares, No drainage from ears  NERVOUS SYSTEM:  Awake and Alert  CHEST/LUNG: Decreased  EXTREMITIES:  ++Edema  SKIN: No rashes No obvious ecchymosis      LABS:                                   11.6   5.48  )-----------( 174      ( 31 Mar 2024 09:00 )             37.9     03-31    142  |  104  |  51<H>  ----------------------------<  124<H>  3.9   |  29  |  2.30<H>    Ca    8.4<L>      31 Mar 2024 09:00  Phos  3.5     03-31  Mg     2.4     03-31    TPro  7.2  /  Alb  3.3  /  TBili  0.9  /  DBili  x   /  AST  17  /  ALT  13  /  AlkPhos  67  03-30    PT/INR - ( 29 Mar 2024 18:40 )   PT: 13.0 sec;   INR: 1.11 ratio         PTT - ( 29 Mar 2024 18:40 )  PTT:32.1 sec  Urinalysis Basic - ( 31 Mar 2024 09:00 )    Color: x / Appearance: x / SG: x / pH: x  Gluc: 124 mg/dL / Ketone: x  / Bili: x / Urobili: x   Blood: x / Protein: x / Nitrite: x   Leuk Esterase: x / RBC: x / WBC x   Sq Epi: x / Non Sq Epi: x / Bacteria: x                RADIOLOGY & ADDITIONAL TESTS:

## 2024-04-01 NOTE — PHYSICAL THERAPY INITIAL EVALUATION ADULT - PRECAUTIONS/LIMITATIONS, REHAB EVAL
1 L/min O2 NC donned t/o; universal precautions;/no known precautions/limitations/oxygen therapy device and L/min

## 2024-04-01 NOTE — PHYSICAL THERAPY INITIAL EVALUATION ADULT - GAIT TRAINING, PT EVAL
Patient will ambulate independently for 100 feet with use of appropriate assistive device to be able to negotiate home environment, within 3 to 5 sessions.

## 2024-04-01 NOTE — PHYSICAL THERAPY INITIAL EVALUATION ADULT - MANUAL MUSCLE TESTING RESULTS, REHAB EVAL
; B UE and B LE grossly < or = to 3/5 via functional assessment, no resistance applied./grossly assessed due to

## 2024-04-01 NOTE — PROGRESS NOTE ADULT - SUBJECTIVE AND OBJECTIVE BOX
[facility] -- Howard Kimble, Carlos Luong Savella, , Edgar Sutton  Office # [phone]    Follow Up:  ADHF, Edema    Subjective/Observations: Awake and alert, denies SOB but still on NC at 1-2L/min.  Patient states, her O2 Sat went down overnight.  Admits to be urinating well.  States, edema is significantly improved.  Denies CP or palpitation    REVIEW OF SYSTEMS: All other review of systems is negative unless indicated above  PAST MEDICAL & SURGICAL HISTORY:  Hypertension  Diabetes  Lymphedema  H/O left bundle branch block  History of left bundle branch block (LBBB)  Systolic heart failure, chronic  H/O cataract  2020  History of surgical removal of meniscus of knee  left in 1971  Frozen shoulder  2000  H/O Achilles tendon repair  lengthened bilaterally, 2000  Fractured skull  1968    MEDICATIONS  (STANDING):  aspirin  chewable 81 milliGRAM(s) Oral daily  atorvastatin 80 milliGRAM(s) Oral at bedtime  dextrose 5%. 1000 milliLiter(s) (50 mL/Hr) IV Continuous <Continuous>  dextrose 5%. 1000 milliLiter(s) (100 mL/Hr) IV Continuous <Continuous>  dextrose 50% Injectable 25 Gram(s) IV Push once  dextrose 50% Injectable 12.5 Gram(s) IV Push once  dextrose 50% Injectable 25 Gram(s) IV Push once  ferrous    sulfate 325 milliGRAM(s) Oral two times a day  furosemide   Injectable 60 milliGRAM(s) IV Push two times a day  glucagon  Injectable 1 milliGRAM(s) IntraMuscular once  heparin   Injectable 5000 Unit(s) SubCutaneous every 12 hours  insulin glargine Injectable (LANTUS) 20 Unit(s) SubCutaneous at bedtime  insulin lispro (ADMELOG) corrective regimen sliding scale   SubCutaneous three times a day before meals  levothyroxine 75 MICROGram(s) Oral daily  metoprolol succinate  milliGRAM(s) Oral daily  nystatin Powder 1 Application(s) Topical two times a day  pantoprazole    Tablet 40 milliGRAM(s) Oral before breakfast    MEDICATIONS  (PRN):  dextrose Oral Gel 15 Gram(s) Oral once PRN Blood Glucose LESS THAN 70 milliGRAM(s)/deciliter    Allergies    Ativan (Rash; Urticaria)  penicillins (Hives)    Intolerances    Vital Signs Last 24 Hrs  T(C): 36.3 (01 Apr 2024 04:40), Max: 36.6 (31 Mar 2024 20:38)  T(F): 97.4 (01 Apr 2024 04:40), Max: 97.8 (31 Mar 2024 20:38)  HR: 59 (01 Apr 2024 04:40) (57 - 65)  BP: 116/68 (01 Apr 2024 04:40) (109/63 - 143/68)  BP(mean): --  RR: 18 (01 Apr 2024 04:40) (18 - 18)  SpO2: 92% (01 Apr 2024 04:40) (91% - 96%)    Parameters below as of 01 Apr 2024 04:40  Patient On (Oxygen Delivery Method): nasal cannula  O2 Flow (L/min): 1    I&O's Summary    31 Mar 2024 07:01  -  01 Apr 2024 07:00  --------------------------------------------------------  IN: 0 mL / OUT: 2800 mL / NET: -2800 mL      PHYSICAL EXAM:  TELE: Not on tele  Constitutional: NAD, awake and alert  HEENT: Moist Mucous Membranes, Anicteric  Pulmonary: Non-labored, breath sounds are diminished bilaterally, +wheezing, no rales or rhonchi  Cardiovascular: Regular, S1 and S2, +murmurs, no rubs, gallops or clicks  Gastrointestinal: Bowel Sounds present, soft, nontender.   Lymph: 2-3+ BLE edema. No lymphadenopathy.  Skin: + rashes from eczema no ulcers.  Psych:  Mood & affect appropriate  LABS: All Labs Reviewed:                        11.6   5.48  )-----------( 174      ( 31 Mar 2024 09:00 )             37.9                         11.6   5.58  )-----------( 161      ( 30 Mar 2024 08:21 )             36.7                         12.0   6.64  )-----------( 181      ( 29 Mar 2024 18:40 )             38.4     31 Mar 2024 09:00    142    |  104    |  51     ----------------------------<  124    3.9     |  29     |  2.30   30 Mar 2024 08:21    141    |  105    |  55     ----------------------------<  140    4.2     |  27     |  2.50   29 Mar 2024 18:40    141    |  103    |  54     ----------------------------<  137    4.3     |  29     |  2.70     Ca    8.4        31 Mar 2024 09:00  Ca    8.3        30 Mar 2024 08:21  Ca    8.3        29 Mar 2024 18:40  Phos  3.5       31 Mar 2024 09:00  Phos  3.7       30 Mar 2024 08:21  Mg     2.4       31 Mar 2024 09:00  Mg     2.4       30 Mar 2024 08:21  Mg     2.4       29 Mar 2024 18:40    TPro  7.2    /  Alb  3.3    /  TBili  0.9    /  DBili  x      /  AST  17     /  ALT  13     /  AlkPhos  67     30 Mar 2024 08:21  TPro  7.7    /  Alb  3.5    /  TBili  0.9    /  DBili  x      /  AST  15     /  ALT  13     /  AlkPhos  74     29 Mar 2024 18:40    12 Lead ECG:   Ventricular Rate 57 BPM    Atrial Rate 57 BPM    P-R Interval 312 ms    QRS Duration 168 ms    Q-T Interval 520 ms    QTC Calculation(Bazett) 506 ms    P Axis 38 degrees    R Axis 212 degrees    T Axis -31 degrees    Diagnosis Line Sinus bradycardia with 1st degree AV block  Right superior axis deviation  Nonspecific intraventricular block  Abnormal ECG  When compared with ECG of 09-JAN-2024 09:46,  Nonspecific T wave abnormality now evident in Inferior leads  Nonspecific T wave abnormality, improved in Lateral leads  Confirmed by Dale Lawrence MD ([id number]) on 3/31/2024 2:43:25 PM (03-29-24 @ 17:08)      TRANSTHORACIC ECHOCARDIOGRAM REPORT  ________________________________________________________________________________                                      _______       Pt. Name:       EVELYN LOPEZ Study Date:    6/6/2023  MRN:            [id number]         YOB: 1950  Accession #:    [id number]        Age:           72 years  Account#:       [id number]       Gender:        F  Heart Rate:                      Height:        70.87 in (180.00 cm)  Rhythm:                          Weight:        ( )  Blood Pressure: 119/57 mmHg      BSA/BMI:       /  ________________________________________________________________________________________  Referring Physician:    [id number] Luis Mcdermott  Interpreting Physician: Skylar Hernández MD  Primary Sonographer:    AS    CPT:               ECHO TTE WO CON COMP W DOPP - 06674.m  Indication(s):     Cardiac murmur, unspecified - R01.1  Procedure:         Transthoracic echocardiogram with 2-D, M-mode and complete                     spectral and color flow Doppler.  Ordering Location: [facility]    _______________________________________________________________________________________  CONCLUSIONS:      1. The left ventricular systolic function is mildly decreased with an ejection fraction of 38 % by Nunes's method of disks.   2. Normal right ventricular cavity size and probably normal systolic function.   3. The left atrium is mildly dilated.   4. The right atrium is normal.   5. Moderate calcification of the aortic valve leaflets.  6. Moderate aortic stenosis.   7. There is moderate calcification of the mitral valve annulus.   8. Trace mitral regurgitation.   9. Trace tricuspid regurgitation.  10. Pulmonic valve was not well visualized.  11. The inferior vena cava is normal measuring 1.86 cm in diameter, (normal <2.1cm) with normal inspiratory collapse (normal >50%) consistent with normal right atrial pressure (~3, range 0-5mmHg).    ________________________________________________________________________________________  FINDINGS:     Left Ventricle:  The left ventricular systolic function is mildly decreased with a calculated ejection fraction of 38 % by the Nunes's biplane method of disks.     Right Ventricle:  Normal right ventricular cavity size and probably normal systolic function. Tricuspid annular plane systolic excursion (TAPSE) is 2.7 cm (normal >=1.7 cm).     Left Atrium:  The left atrium is mildly dilated.     Right Atrium:  The right atrium is normal.     Aortic Valve:  There is moderate calcification of the aortic valve leaflets. There is moderate aortic stenosis. The peak transaortic velocity is 3.80 m/s, peak transaortic gradient is 57.8 mmHg and mean transaortic gradient is 31.0 mmHg with an LVOT/aortic valve VTI ratio of 0.23. The aortic valve area is estimated at 0.64 cm² by the continuity equation.     Mitral Valve:  There is mitral valve thickening of the anterior and posterior leaflets. There is moderate calcification of the mitral valve annulus. There is trace mitral regurgitation.     Tricuspid Valve:  There is trace tricuspid regurgitation.     Pulmonic Valve:  The pulmonic valve was not well visualized.     Systemic Veins:  The inferior vena cava is normal measuring 1.86 cm in diameter, (normal <2.1cm) with normal inspiratorycollapse (normal >50%) consistent with normal right atrial pressure (~3, range 0-5mmHg).  ____________________________________________________________________  QUANTITATIVE DATA  Left Ventricle Measurements                         Indexed BSA  IVSd (2D):   1.1 cm  LVPWd (2D):  1.3 cm  LVIDd (2D):  5.2 cm  LVIDs (2D):  4.3 cm  LV Mass:     247 g  LV Vol d, MOD A2C: 111.0 ml  LV Vol d, MOD A4C: 112.0 ml  LV Vol d, MOD BP:  116.7 ml  LV Vol s, MOD A2C: 70.9 ml  LV Vol s, MOD A4C: 67.8 ml  LV Vol s, MOD BP:  72.9 ml  LVOT SV MOD BP:    43.8 ml  LV EF% MOD BP:     38 %     MV E Vmax:    0.89 m/s  MV A Vmax:    1.22 m/s  MV E/A:       0.73  e' lateral:   12.60 cm/s  e' medial:    11.60 cm/s  E/e' lateral: 7.10  E/e' medial:  7.71  E/e' Average: 7.39  MV DT:        207 msec       Left Atrium Measurements     LA Diam 2D: 4.20 cm    Right Ventricle Measurements     TAPSE:            2.7 cm  TV Preeti. S':       15.50 cm/s  RV Base (RVID1):  3.5 cm  RV Mid (RVID2):   3.1 cm  RV Major (RVID3): 8.3 cm       LVOT / RVOT/ Qp/Qs Data:  LVOT Diameter: 1.90 cm  LVOT Vmax:     0.83 m/s  LVOT VTI:      17.50 cm  LVOT SV:       49.6 ml    Aortic Valve Measurements     AV Vmax:          380.0 cm/s  AV Peak Gradient: 57.8 mmHg  AV Mean Gradient: 31.0 mmHg  AVVTI:           77.6 cm  AV VTI Ratio:     0.23  AoV EOA, Contin:  0.64 cm²    Mitral Valve Measurements     MV E Vmax: 0.9 m/s  MV A Vmax: 1.2 m/s  MV E/A:    0.7    Tricuspid Valve Measurements     RA Pressure: 3 mmHg    ________________________________________________________________________________________  Diagnosing Physician: Skylar Hernández MD.  Electronically signed on 6/7/2023 at 6:14:52 PM by Skylar Hernández MD     *** Final ***

## 2024-04-01 NOTE — CONSULT NOTE ADULT - SUBJECTIVE AND OBJECTIVE BOX
Date/Time Patient Seen:  		  Referring MD:   Data Reviewed	       Patient is a 73y old  Female who presents with a chief complaint of Acute on chronic systolic CHF (01 Apr 2024 15:17)      Subjective/HPI   73F with a PMHx of Chronic Systolic CHF (last TTE with EF 38%), moderate aortic stenosis, known LBBB, HTN, DM1, CKD, hypothyroidism, and chronic lymphedema presents to the ED with shortness of breath over the past two weeks which has been acutely worsening. Pt states she is compliant with her medications but has not be as compliant with monitoring her fluid intake. Currently she denies chest pain, dizziness, abdominal pain, nausea, vomiting, diarrhea, fever, or chills.  PAST MEDICAL & SURGICAL HISTORY:  Hypertension    Diabetes    Lymphedema    H/O left bundle branch block    History of left bundle branch block (LBBB)    Systolic heart failure, chronic    Type 1 diabetes    H/O cataract  2020    History of surgical removal of meniscus of knee  left in 1971    Frozen shoulder  2000    H/O Achilles tendon repair  lengthened bilaterally, 2000    Fractured skull  1968    PAST SURGICAL HISTORY:  Fractured skull 1968    Frozen shoulder 2000    H/O Achilles tendon repair lengthened bilaterally, 2000    H/O cataract 2020    History of surgical removal of meniscus of knee left in 1971.     FAMILY HISTORY:  Family history of CVA, mom, age 57.     Social History:  · Substance use	No     Tobacco Screening:  · Core Measure Site	Yes  · Has the patient used tobacco in the past 30 days?	No    Risk Assessment:    Present on Admission:  Deep Venous Thrombosis	no  Pulmonary Embolus	no     HIV Screening:  · In accordance with [place] State law, we offer every patient who comes to our ED an HIV test. Would you like to be tested today?	Opt out        Medication list         MEDICATIONS  (STANDING):  aspirin  chewable 81 milliGRAM(s) Oral daily  atorvastatin 80 milliGRAM(s) Oral at bedtime  dextrose 5%. 1000 milliLiter(s) (100 mL/Hr) IV Continuous <Continuous>  dextrose 5%. 1000 milliLiter(s) (50 mL/Hr) IV Continuous <Continuous>  dextrose 50% Injectable 12.5 Gram(s) IV Push once  dextrose 50% Injectable 25 Gram(s) IV Push once  dextrose 50% Injectable 25 Gram(s) IV Push once  ferrous    sulfate 325 milliGRAM(s) Oral two times a day  furosemide   Injectable 80 milliGRAM(s) IV Push every 12 hours  glucagon  Injectable 1 milliGRAM(s) IntraMuscular once  heparin   Injectable 5000 Unit(s) SubCutaneous every 12 hours  insulin glargine Injectable (LANTUS) 15 Unit(s) SubCutaneous at bedtime  insulin lispro (ADMELOG) corrective regimen sliding scale   SubCutaneous three times a day before meals  levothyroxine 75 MICROGram(s) Oral daily  metoprolol succinate  milliGRAM(s) Oral daily  nystatin Powder 1 Application(s) Topical two times a day  pantoprazole    Tablet 40 milliGRAM(s) Oral before breakfast    MEDICATIONS  (PRN):  dextrose Oral Gel 15 Gram(s) Oral once PRN Blood Glucose LESS THAN 70 milliGRAM(s)/deciliter         Vitals log        ICU Vital Signs Last 24 Hrs  T(C): 36.9 (01 Apr 2024 12:19), Max: 36.9 (01 Apr 2024 12:19)  T(F): 98.5 (01 Apr 2024 12:19), Max: 98.5 (01 Apr 2024 12:19)  HR: 53 (01 Apr 2024 12:19) (53 - 59)  BP: 112/63 (01 Apr 2024 12:19) (109/63 - 116/68)  BP(mean): --  ABP: --  ABP(mean): --  RR: 18 (01 Apr 2024 12:19) (18 - 18)  SpO2: 97% (01 Apr 2024 12:19) (92% - 97%)    O2 Parameters below as of 01 Apr 2024 12:19  Patient On (Oxygen Delivery Method): nasal cannula  O2 Flow (L/min): 1               Input and Output:  I&O's Detail    31 Mar 2024 07:01  -  01 Apr 2024 07:00  --------------------------------------------------------  IN:  Total IN: 0 mL    OUT:    Voided (mL): 2800 mL  Total OUT: 2800 mL    Total NET: -2800 mL          Lab Data                        11.4   5.74  )-----------( 180      ( 01 Apr 2024 07:10 )             37.3     04-01    145  |  104  |  51<H>  ----------------------------<  87  4.0   |  36<H>  |  2.40<H>    Ca    8.9      01 Apr 2024 07:10  Phos  4.2     04-01  Mg     2.3     04-01              Review of Systems	  quigley  on o2 support      Objective     Physical Examination    heart s1s2  lung dec bS  head nc  verbal  alert  cn grossly int      Pertinent Lab findings & Imaging      De [place] Garza:  NO   Adequate UO     I&O's Detail    31 Mar 2024 07:01  -  01 Apr 2024 07:00  --------------------------------------------------------  IN:  Total IN: 0 mL    OUT:    Voided (mL): 2800 mL  Total OUT: 2800 mL    Total NET: -2800 mL               Discussed with:     Cultures:	        Radiology    CONCLUSIONS:      1. Technically difficult image quality.   2. Left ventricular systolic function is moderately to severely decreased with an ejection fraction of 30 % by Nunes's method of disks.   3. Mild to moderate left ventricular hypertrophy.   4. The right ventricle is not well visualized. Based on visual assessment, the right ventricle appears mildly enlarged. mildly reduced systolic function.   5. The left atrium is mildly dilated.   6. Trace aortic regurgitation.   7. Mild mitral regurgitation.   8. Moderate to severe aortic stenosis. There is a Vmax of 3.66m/s, Mean gradient of 26mmHg and a DI of 0.19. The JUAN DANIEL is calculated as 0.61cm2 by continuity. This likely represents low flow low gradient AS.   9. Estimated pulmonary artery systolic pressure is 59 mmHg, consistent with moderate pulmonary hypertension.  10. The inferior vena cava is dilated measuring 2.60 cm in diameter, (dilated >2.1cm) with normal inspiratory collapse (normal >50%) consistent with mildly elevated right atrial pressure (~8, range 5-10mmHg).              ACC: [id number] EXAM:  CT ABDOMEN AND PELVIS   ORDERED BY:  TEMO CASTELAN     PROCEDURE DATE:  01/10/2024          INTERPRETATION:  CLINICAL INFORMATION: 73 years  Female with Nausea,   vomiting.    COMPARISON: Noncontrast CT abdomen and pelvis 8/11/2023    CONTRAST/COMPLICATIONS:  IV Contrast: NONE  Oral Contrast: NONE  Complications: None reported at time of study completion    PROCEDURE:  CT of the Abdomen and Pelvis was performed.  Sagittal and coronal reformats were performed.    FINDINGS:  LOWER CHEST: Small bilateral pleural effusions, larger on the right. Mild   cardiomegaly.    LIVER: Within normal limits.  BILE DUCTS: Normal caliber.  GALLBLADDER: Cholelithiasis.  SPLEEN: Within normal limits.  PANCREAS: Within normal limits.  ADRENALS: Within normal limits.  KIDNEYS/URETERS: Mild atrophy. No hydronephrosis. Bilateral nonspecific   perinephric fat stranding.    BLADDER: Within normal limits.  REPRODUCTIVE ORGANS: Unremarkable uterus.    BOWEL: No bowel obstruction. Unremarkable appendix. New right lower   quadrant metallic clip, possibly hemostatic clip in the cecum.  PERITONEUM: No ascites.  VESSELS: Atherosclerotic changes.  RETROPERITONEUM/LYMPH NODES: No lymphadenopathy. Periaortic lymph nodes   measuring up to 5 mm short axis.  ABDOMINAL WALL: Anasarca.  BONES: Bilateral L5 spondylolysis with grade 1 L5-S1 anterolisthesis.   Degenerative changes.    IMPRESSION:  Small bilateral pleural effusions, larger on the right. Mild cardiomegaly.    No acute intra-abdominal pathology.    New right lower quadrant metallic clip, possibly hemostatic clip in the   cecum. Correlate clinically.        --- End of Report ---            JALIL RODRIGUEZ MD; Attending Radiologist  This document has been electronically signed. Manuel 10 2024 10:32AM

## 2024-04-01 NOTE — PROGRESS NOTE ADULT - ASSESSMENT
74 y/o F with Systolic HF (EF 38%), mod AS, HTN, T2DM, LBBB, CKD, and lymphedema presented to the ED c/o SOB, CURTIS, orthopnea, and edema for >2 weeks.  States, she has been compliant with her diuretic.  However, admits to be drinking fluids very judiciously (at least 3L/day). Admitted with acute on chr CHF exacerbation. Follows with Dr. Luong    Acute on chronic Systolic HF (EF 38%), mod AS, HTN, LBBB, Lymphedema   - Has known systolic HF, EF 38% (2023), mod AS.    - On home Torsemide 20 mg daily, Eplerenone 25 mg daily, and Toprol  mg daily  - Compliant with all meds including Torsemide, though, dose was reduced to daily.  Additionally, she has been drinking fluids excessively  - BNP:  <--81069  - Though, volume status improved, she remains overloaded with +wheezing.  Continue to hold home Torsemide and home Eplerenone for now  - Increase Lasix to 80 mg q12H.  Creatinine  continues to improve  - Creatinine:  <--2.50,  <--2.70.  Avoid nephrotoxics  - Monitor for contraction alkalosis  - Continue home Toprol XL  - Fluid restriction of 1.5L/day  - Strict I/O's and daily weights.  Patient has known lymphedema  - Follow up TTE    - Has a known LBBB  - Had LHC in 7/2022 showing non-obstructive CAD  - Troponin: <-22  - Continue ASA and statin    - BP stable and controlled   - Monitor and replete lytes, keep K>4, Mg>2.  - Will continue to follow.    Kira Zuniga DNP, NP-C, AGACNP-C  Cardiology   Call TEAMS        72 y/o F with Systolic HF (EF 38%), mod AS, HTN, T1DM, LBBB, CKD, and lymphedema presented to the ED c/o SOB, CURTIS, orthopnea, and edema for >2 weeks.  States, she has been compliant with her diuretic.  However, admits to be drinking fluids very judiciously (at least 3L/day). Admitted with acute on chr CHF exacerbation. Follows with Dr. Luong    Acute on chronic Systolic HF (EF 38%), mod AS, HTN, LBBB, Lymphedema   - Has known systolic HF, EF 38% (2023), mod AS.    - On home Torsemide 20 mg daily, Eplerenone 25 mg daily, and Toprol  mg daily  - Compliant with all meds including Torsemide, though, dose was reduced to daily.  Additionally, she has been drinking fluids excessively  - BNP:  <--10798  - Though, volume status improved, she remains overloaded with +wheezing.  Continue to hold home Torsemide and home Eplerenone for now  - Increase Lasix to 80 mg q12H.  Creatinine  continues to improve  - Creatinine:  <--2.50,  <--2.70.  Avoid nephrotoxics  - Monitor for contraction alkalosis  - Continue home Toprol XL  - Fluid restriction of 1.5L/day  - Strict I/O's and daily weights.  Patient has known lymphedema  - Follow up TTE    - Has a known LBBB  - Had LHC in 7/2022 showing non-obstructive CAD  - Troponin: <-22  - Continue ASA and statin    - BP stable and controlled   - Monitor and replete lytes, keep K>4, Mg>2.  - Will continue to follow.    Kira Zuniga DNP, NP-C, AGACNP-C  Cardiology   Call TEAMS

## 2024-04-01 NOTE — PROGRESS NOTE ADULT - PROBLEM SELECTOR PLAN 2
- DUNCAN on CKD on admission (2.7 on ADM, baseline 2.4)  - Resolved, acutely requiring IV Diuresis  - Monitor daily chemistry  - Nephro Dr. Edwards following Acute hypoxic respiratory failure overnight  - Suspect LELIA  - Patient on RA this AM  - Pulm consult for possible CPAP trial

## 2024-04-01 NOTE — PROGRESS NOTE ADULT - SUBJECTIVE AND OBJECTIVE BOX
Patient is a 73y old  Female who presents with a chief complaint of Acute on chronic systolic CHF (01 Apr 2024 09:22)      INTERVAL HPI/OVERNIGHT EVENTS: Placed on NC overnight for desat to 80s. Patient denies sob, difficulty breathing, cp, now and during this episode.  Pt denies headache, dizziness, lightheadedness, fever, chills, body aches, CP, SOB, palpitations, abdominal pain, n/v, numbness/tingling.  No other complaints at this time.     MEDICATIONS  (STANDING):  aspirin  chewable 81 milliGRAM(s) Oral daily  atorvastatin 80 milliGRAM(s) Oral at bedtime  dextrose 5%. 1000 milliLiter(s) (100 mL/Hr) IV Continuous <Continuous>  dextrose 5%. 1000 milliLiter(s) (50 mL/Hr) IV Continuous <Continuous>  dextrose 50% Injectable 12.5 Gram(s) IV Push once  dextrose 50% Injectable 25 Gram(s) IV Push once  dextrose 50% Injectable 25 Gram(s) IV Push once  ferrous    sulfate 325 milliGRAM(s) Oral two times a day  furosemide   Injectable 80 milliGRAM(s) IV Push every 12 hours  glucagon  Injectable 1 milliGRAM(s) IntraMuscular once  heparin   Injectable 5000 Unit(s) SubCutaneous every 12 hours  insulin glargine Injectable (LANTUS) 15 Unit(s) SubCutaneous at bedtime  insulin lispro (ADMELOG) corrective regimen sliding scale   SubCutaneous three times a day before meals  levothyroxine 75 MICROGram(s) Oral daily  metoprolol succinate  milliGRAM(s) Oral daily  nystatin Powder 1 Application(s) Topical two times a day  pantoprazole    Tablet 40 milliGRAM(s) Oral before breakfast    MEDICATIONS  (PRN):  dextrose Oral Gel 15 Gram(s) Oral once PRN Blood Glucose LESS THAN 70 milliGRAM(s)/deciliter      Allergies    Ativan (Rash; Urticaria)  penicillins (Hives)    Intolerances        REVIEW OF SYSTEMS:  CONSTITUTIONAL: No fever or chills  HEENT:  No headache, no sore throat  RESPIRATORY: No cough, wheezing, or shortness of breath  CARDIOVASCULAR: No chest pain, palpitations  GASTROINTESTINAL: No abd pain, nausea, vomiting, or diarrhea  GENITOURINARY: No dysuria, frequency, or hematuria  NEUROLOGICAL: no focal weakness or dizziness  MUSCULOSKELETAL: no myalgias     Vital Signs Last 24 Hrs  T(C): 36.3 (01 Apr 2024 04:40), Max: 36.6 (31 Mar 2024 20:38)  T(F): 97.4 (01 Apr 2024 04:40), Max: 97.8 (31 Mar 2024 20:38)  HR: 59 (01 Apr 2024 04:40) (57 - 65)  BP: 116/68 (01 Apr 2024 04:40) (109/63 - 143/68)  BP(mean): --  RR: 18 (01 Apr 2024 04:40) (18 - 18)  SpO2: 92% (01 Apr 2024 04:40) (91% - 96%)    Parameters below as of 01 Apr 2024 04:40  Patient On (Oxygen Delivery Method): nasal cannula  O2 Flow (L/min): 1      PHYSICAL EXAM:  GENERAL: NAD, well-appearing  HEENT:  anicteric, moist mucous membranes  CHEST/LUNG: Difficult auscultation d/t body habitus, though decreased sounds noted at BL bases. No wheezing noted.  HEART:  RRR, loud whooshing pansystolic murmur heard in all areas, grade 5/6, unable to appreciate S1/S2  ABDOMEN:  obese, BS+, soft, nontender, nondistended  EXTREMITIES: Severe, 4+ pitting edema in B/L LE with multiple excoriations, venous stasis dermatitis in BLLE  NERVOUS SYSTEM: AAO x3. Answers questions and follows commands appropriately    LABS:                        11.4   5.74  )-----------( 180      ( 01 Apr 2024 07:10 )             37.3     CBC Full  -  ( 01 Apr 2024 07:10 )  WBC Count : 5.74 K/uL  Hemoglobin : 11.4 g/dL  Hematocrit : 37.3 %  Platelet Count - Automated : 180 K/uL  Mean Cell Volume : 90.3 fl  Mean Cell Hemoglobin : 27.6 pg  Mean Cell Hemoglobin Concentration : 30.6 gm/dL  Auto Neutrophil # : x  Auto Lymphocyte # : x  Auto Monocyte # : x  Auto Eosinophil # : x  Auto Basophil # : x  Auto Neutrophil % : x  Auto Lymphocyte % : x  Auto Monocyte % : x  Auto Eosinophil % : x  Auto Basophil % : x    01 Apr 2024 07:10    145    |  104    |  51     ----------------------------<  87     4.0     |  36     |  2.40     Ca    8.9        01 Apr 2024 07:10  Phos  4.2       01 Apr 2024 07:10  Mg     2.3       01 Apr 2024 07:10        Urinalysis Basic - ( 01 Apr 2024 07:10 )    Color: x / Appearance: x / SG: x / pH: x  Gluc: 87 mg/dL / Ketone: x  / Bili: x / Urobili: x   Blood: x / Protein: x / Nitrite: x   Leuk Esterase: x / RBC: x / WBC x   Sq Epi: x / Non Sq Epi: x / Bacteria: x      CAPILLARY BLOOD GLUCOSE      POCT Blood Glucose.: 83 mg/dL (01 Apr 2024 08:00)  POCT Blood Glucose.: 163 mg/dL (31 Mar 2024 20:57)  POCT Blood Glucose.: 117 mg/dL (31 Mar 2024 16:55)          RADIOLOGY & ADDITIONAL TESTS:     Consultant(s) Notes Reviewed:  [x] YES  [ ] NO     Patient is a 73y old  Female who presents with a chief complaint of Acute on chronic systolic CHF (01 Apr 2024 09:22)      INTERVAL HPI/OVERNIGHT EVENTS: Placed on NC overnight for desat to 80s - now on RA. Patient denies sob, difficulty breathing, cp, now and during this episode. Pt denies headache, dizziness, lightheadedness, fever, chills, body aches, CP, SOB, palpitations, abdominal pain, n/v, numbness/tingling. No other complaints at this time. States ambulating without difficulty.    MEDICATIONS  (STANDING):  aspirin  chewable 81 milliGRAM(s) Oral daily  atorvastatin 80 milliGRAM(s) Oral at bedtime  dextrose 5%. 1000 milliLiter(s) (100 mL/Hr) IV Continuous <Continuous>  dextrose 5%. 1000 milliLiter(s) (50 mL/Hr) IV Continuous <Continuous>  dextrose 50% Injectable 12.5 Gram(s) IV Push once  dextrose 50% Injectable 25 Gram(s) IV Push once  dextrose 50% Injectable 25 Gram(s) IV Push once  ferrous    sulfate 325 milliGRAM(s) Oral two times a day  furosemide   Injectable 80 milliGRAM(s) IV Push every 12 hours  glucagon  Injectable 1 milliGRAM(s) IntraMuscular once  heparin   Injectable 5000 Unit(s) SubCutaneous every 12 hours  insulin glargine Injectable (LANTUS) 15 Unit(s) SubCutaneous at bedtime  insulin lispro (ADMELOG) corrective regimen sliding scale   SubCutaneous three times a day before meals  levothyroxine 75 MICROGram(s) Oral daily  metoprolol succinate  milliGRAM(s) Oral daily  nystatin Powder 1 Application(s) Topical two times a day  pantoprazole    Tablet 40 milliGRAM(s) Oral before breakfast    MEDICATIONS  (PRN):  dextrose Oral Gel 15 Gram(s) Oral once PRN Blood Glucose LESS THAN 70 milliGRAM(s)/deciliter      Allergies    Ativan (Rash; Urticaria)  penicillins (Hives)    Intolerances        REVIEW OF SYSTEMS:  CONSTITUTIONAL: No fever or chills  HEENT:  No headache, no sore throat  RESPIRATORY: No cough, wheezing, or shortness of breath  CARDIOVASCULAR: No chest pain, palpitations  GASTROINTESTINAL: No abd pain, nausea, vomiting, or diarrhea  GENITOURINARY: No dysuria, frequency, or hematuria  NEUROLOGICAL: no focal weakness or dizziness  MUSCULOSKELETAL: no myalgias     Vital Signs Last 24 Hrs  T(C): 36.3 (01 Apr 2024 04:40), Max: 36.6 (31 Mar 2024 20:38)  T(F): 97.4 (01 Apr 2024 04:40), Max: 97.8 (31 Mar 2024 20:38)  HR: 59 (01 Apr 2024 04:40) (57 - 65)  BP: 116/68 (01 Apr 2024 04:40) (109/63 - 143/68)  RR: 18 (01 Apr 2024 04:40) (18 - 18)  SpO2: 92% (01 Apr 2024 04:40) (91% - 96%)    Parameters below as of 01 Apr 2024 04:40  Patient On (Oxygen Delivery Method): nasal cannula  O2 Flow (L/min): 1      PHYSICAL EXAM:  GENERAL: NAD, well-appearing  HEENT:  anicteric, moist mucous membranes  CHEST/LUNG: Difficult auscultation d/t body habitus, though decreased sounds noted at BL bases. No wheezing noted.  HEART:  RRR, loud whooshing pansystolic murmur heard in all areas, grade 5/6, unable to appreciate S1/S2  ABDOMEN:  obese, BS+, soft, nontender, nondistended  EXTREMITIES: Severe, 4+ pitting edema in B/L LE with multiple excoriations, venous stasis dermatitis in BLLE  NERVOUS SYSTEM: AAO x3. Answers questions and follows commands appropriately  PSYCH: normal affect    LABS:                        11.4   5.74  )-----------( 180      ( 01 Apr 2024 07:10 )             37.3     CBC Full  -  ( 01 Apr 2024 07:10 )  WBC Count : 5.74 K/uL  Hemoglobin : 11.4 g/dL  Hematocrit : 37.3 %  Platelet Count - Automated : 180 K/uL  Mean Cell Volume : 90.3 fl  Mean Cell Hemoglobin : 27.6 pg  Mean Cell Hemoglobin Concentration : 30.6 gm/dL  Auto Neutrophil # : x  Auto Lymphocyte # : x  Auto Monocyte # : x  Auto Eosinophil # : x  Auto Basophil # : x  Auto Neutrophil % : x  Auto Lymphocyte % : x  Auto Monocyte % : x  Auto Eosinophil % : x  Auto Basophil % : x    01 Apr 2024 07:10    145    |  104    |  51     ----------------------------<  87     4.0     |  36     |  2.40     Ca    8.9        01 Apr 2024 07:10  Phos  4.2       01 Apr 2024 07:10  Mg     2.3       01 Apr 2024 07:10        Urinalysis Basic - ( 01 Apr 2024 07:10 )    Color: x / Appearance: x / SG: x / pH: x  Gluc: 87 mg/dL / Ketone: x  / Bili: x / Urobili: x   Blood: x / Protein: x / Nitrite: x   Leuk Esterase: x / RBC: x / WBC x   Sq Epi: x / Non Sq Epi: x / Bacteria: x      CAPILLARY BLOOD GLUCOSE      POCT Blood Glucose.: 83 mg/dL (01 Apr 2024 08:00)  POCT Blood Glucose.: 163 mg/dL (31 Mar 2024 20:57)  POCT Blood Glucose.: 117 mg/dL (31 Mar 2024 16:55)          RADIOLOGY & ADDITIONAL TESTS:     Consultant(s) Notes Reviewed:  [x] YES  [ ] NO

## 2024-04-01 NOTE — PHYSICAL THERAPY INITIAL EVALUATION ADULT - ADDITIONAL COMMENTS
Pt lives in a private home with her partner, 2 DEON. Pt has a quad cane (at bedside), RW, shower seat, raised toilet and motorized scooter for the community. Pt's partner assists with some household chores (ie - laundry in basement), pt states both her and her partner drive. Pt states she uses a lymphatic pump once a day and wears compression stockings.

## 2024-04-01 NOTE — DISCHARGE NOTE PROVIDER - DETAILS OF MALNUTRITION DIAGNOSIS/DIAGNOSES
This patient has been assessed with a concern for Malnutrition and was treated during this hospitalization for the following Nutrition diagnosis/diagnoses:     -  03/31/2024: Morbid obesity (BMI > 40)

## 2024-04-01 NOTE — DISCHARGE NOTE PROVIDER - ATTENDING DISCHARGE PHYSICAL EXAMINATION:
PHYSICAL EXAM ON DAY OF DISCHARGE:    T(C): 36.3 (04-05-24 @ 12:06), Max: 36.5 (04-04-24 @ 20:11)  HR: 58 (04-05-24 @ 12:06) (56 - 60)  BP: 111/60 (04-05-24 @ 04:33) (109/64 - 116/71)  RR: 18 (04-05-24 @ 12:06) (17 - 18)  SpO2: 98% (04-05-24 @ 12:06) (97% - 98%)    GENERAL: patient appears well, no acute distress, appropriately interactive  EYES: sclera clear, no exudates  ENMT: oropharynx clear without erythema, no exudates, moist mucous membranes  LUNGS: Lungs CTA B/L  HEART:  RRR, pansystolic murmur  ABDOMEN:  obese abdomen, soft, NTND x4 quadrants  EXTREMITIES: +Lymphedema with pitting, diffuse excoriations and xerosis in BLLE  NERVOUS SYSTEM: AAO x3; answers questions and follows commands appropriately

## 2024-04-01 NOTE — PROGRESS NOTE ADULT - ASSESSMENT
DUNCAN on CKD 4  CHF  HTN  LE Edema    -Baseline Creatinine approx 2.4  -DUNCAN likely 2/2 to CRS  -Urine lytes will be less helpful in the setting of diuretic administration  -Will need to cont diuretic and monitor renal tolerance  -BP well controlled  -Creatinine improving and respiratory status improved  -Labs pending

## 2024-04-01 NOTE — PROGRESS NOTE ADULT - ASSESSMENT
73F with a PMHx of Chronic Systolic CHF (last TTE with EF 38%), moderate aortic stenosis, known LBBB, HTN, DM2, CKD, hypothyroidism, and chronic lymphedema   admitted with acute on chronic systolic CHF and DUNCAN on CKD.  73F with a PMHx of Chronic Systolic CHF (last TTE with EF 38%), moderate aortic stenosis, known LBBB, HTN, DM1, CKD, hypothyroidism, and chronic lymphedema   admitted with acute on chronic systolic CHF and DUNCAN on CKD.  73F with a PMHx of Chronic Systolic CHF (last TTE with EF 38%), moderate aortic stenosis, known LBBB, HTN, DM1, CKD4, hypothyroidism, and chronic lymphedema admitted with acute on chronic systolic CHF and DUNCAN on CKD4.

## 2024-04-01 NOTE — DISCHARGE NOTE PROVIDER - CARE PROVIDERS DIRECT ADDRESSES
[email or web address]

## 2024-04-01 NOTE — CONSULT NOTE ADULT - ASSESSMENT
73F with a PMHx of Chronic Systolic CHF (last TTE with EF 38%), moderate aortic stenosis, known LBBB, HTN, DM1, CKD, hypothyroidism, and chronic lymphedema presents to the ED with shortness of breath over the past two weeks which has been acutely worsening.    CHF  pulm HTN  LELIA [facility] eval -   Obesity  AS  valv heart disease  CKD  DM  Atelectasis  Lymphedema    chronic Systolic HF (EF 38%), mod AS, HTN, LBBB, Lymphedema   - Has known systolic HF, EF 38% (2023), mod AS.    diuresis in progress  [facility] rx regimen  cardio eval and follow up noted - follows with Dr Nathan in the office -   dietary discretion  monitor VS and Sat  goal sat > 88 pct  for now nocturnal o2 - will check VBG - assess for LELIA [facility] - outpatient PSG and sleep apnea testing  weight management discussion  mobilize

## 2024-04-01 NOTE — PHYSICAL THERAPY INITIAL EVALUATION ADULT - PREDICTED DURATION OF THERAPY (DAYS/WKS), PT EVAL
Detail Level: Zone Minoxidil 5% Topical Foam Recommendations: Men’s Strength Patient Specific Counseling (Will Not Stick From Patient To Patient): Use Minoxidil QHS Tazorac Counseling:  Patient advised that medication is irritating and drying.  Patient may need to apply sparingly and wash off after an hour before eventually leaving it on overnight.  The patient verbalized understanding of the proper use and possible adverse effects of tazorac.  All of the patient's questions and concerns were addressed. High Dose Vitamin A Pregnancy And Lactation Text: High dose vitamin A therapy is contraindicated during pregnancy and breast feeding. Bactrim Counseling:  I discussed with the patient the risks of sulfa antibiotics including but not limited to GI upset, allergic reaction, drug rash, diarrhea, dizziness, photosensitivity, and yeast infections.  Rarely, more serious reactions can occur including but not limited to aplastic anemia, agranulocytosis, methemoglobinemia, blood dyscrasias, liver or kidney failure, lung infiltrates or desquamative/blistering drug rashes. Winlevi Pregnancy And Lactation Text: This medication is considered safe during pregnancy and breastfeeding. Include Pregnancy/Lactation Warning?: No Erythromycin Pregnancy And Lactation Text: This medication is Pregnancy Category B and is considered safe during pregnancy. It is also excreted in breast milk. Benzoyl Peroxide Counseling: Patient counseled that medicine may cause skin irritation and bleach clothing.  In the event of skin irritation, the patient was advised to reduce the amount of the drug applied or use it less frequently.   The patient verbalized understanding of the proper use and possible adverse effects of benzoyl peroxide.  All of the patient's questions and concerns were addressed. Erythromycin Counseling:  I discussed with the patient the risks of erythromycin including but not limited to GI upset, allergic reaction, drug rash, diarrhea, increase in liver enzymes, and yeast infections. Topical Sulfur Applications Counseling: Topical Sulfur Counseling: Patient counseled that this medication may cause skin irritation or allergic reactions.  In the event of skin irritation, the patient was advised to reduce the amount of the drug applied or use it less frequently.   The patient verbalized understanding of the proper use and possible adverse effects of topical sulfur application.  All of the patient's questions and concerns were addressed. Sarecycline Pregnancy And Lactation Text: This medication is Pregnancy Category D and not consider safe during pregnancy. It is also excreted in breast milk. Dapsone Counseling: I discussed with the patient the risks of dapsone including but not limited to hemolytic anemia, agranulocytosis, rashes, methemoglobinemia, kidney failure, peripheral neuropathy, headaches, GI upset, and liver toxicity.  Patients who start dapsone require monitoring including baseline LFTs and weekly CBCs for the first month, then every month thereafter.  The patient verbalized understanding of the proper use and possible adverse effects of dapsone.  All of the patient's questions and concerns were addressed. Benzoyl Peroxide Pregnancy And Lactation Text: This medication is Pregnancy Category C. It is unknown if benzoyl peroxide is excreted in breast milk. Isotretinoin Counseling: Patient should get monthly blood tests, not donate blood, not drive at night if vision affected, not share medication, and not undergo elective surgery for 6 months after tx completed. Side effects reviewed, pt to contact office should one occur. Azelaic Acid Counseling: Patient counseled that medicine may cause skin irritation and to avoid applying near the eyes.  In the event of skin irritation, the patient was advised to reduce the amount of the drug applied or use it less frequently.   The patient verbalized understanding of the proper use and possible adverse effects of azelaic acid.  All of the patient's questions and concerns were addressed. Doxycycline Counseling:  Patient counseled regarding possible photosensitivity and increased risk for sunburn.  Patient instructed to avoid sunlight, if possible.  When exposed to sunlight, patients should wear protective clothing, sunglasses, and sunscreen.  The patient was instructed to call the office immediately if the following severe adverse effects occur:  hearing changes, easy bruising/bleeding, severe headache, or vision changes.  The patient verbalized understanding of the proper use and possible adverse effects of doxycycline.  All of the patient's questions and concerns were addressed. Spironolactone Pregnancy And Lactation Text: This medication can cause feminization of the male fetus and should be avoided during pregnancy. The active metabolite is also found in breast milk. 1-2 weeks Spironolactone Counseling: Patient advised regarding risks of diarrhea, abdominal pain, hyperkalemia, birth defects (for female patients), liver toxicity and renal toxicity. The patient may need blood work to monitor liver and kidney function and potassium levels while on therapy. The patient verbalized understanding of the proper use and possible adverse effects of spironolactone.  All of the patient's questions and concerns were addressed. Topical Sulfur Applications Pregnancy And Lactation Text: This medication is Pregnancy Category C and has an unknown safety profile during pregnancy. It is unknown if this topical medication is excreted in breast milk. Dapsone Pregnancy And Lactation Text: This medication is Pregnancy Category C and is not considered safe during pregnancy or breast feeding. Tazorac Pregnancy And Lactation Text: This medication is not safe during pregnancy. It is unknown if this medication is excreted in breast milk. Minocycline Counseling: Patient advised regarding possible photosensitivity and discoloration of the teeth, skin, lips, tongue and gums.  Patient instructed to avoid sunlight, if possible.  When exposed to sunlight, patients should wear protective clothing, sunglasses, and sunscreen.  The patient was instructed to call the office immediately if the following severe adverse effects occur:  hearing changes, easy bruising/bleeding, severe headache, or vision changes.  The patient verbalized understanding of the proper use and possible adverse effects of minocycline.  All of the patient's questions and concerns were addressed. Bactrim Pregnancy And Lactation Text: This medication is Pregnancy Category D and is known to cause fetal risk.  It is also excreted in breast milk. Isotretinoin Pregnancy And Lactation Text: This medication is Pregnancy Category X and is considered extremely dangerous during pregnancy. It is unknown if it is excreted in breast milk. Azithromycin Counseling:  I discussed with the patient the risks of azithromycin including but not limited to GI upset, allergic reaction, drug rash, diarrhea, and yeast infections. Azelaic Acid Pregnancy And Lactation Text: This medication is considered safe during pregnancy and breast feeding. Tetracycline Counseling: Patient counseled regarding possible photosensitivity and increased risk for sunburn.  Patient instructed to avoid sunlight, if possible.  When exposed to sunlight, patients should wear protective clothing, sunglasses, and sunscreen.  The patient was instructed to call the office immediately if the following severe adverse effects occur:  hearing changes, easy bruising/bleeding, severe headache, or vision changes.  The patient verbalized understanding of the proper use and possible adverse effects of tetracycline.  All of the patient's questions and concerns were addressed. Patient understands to avoid pregnancy while on therapy due to potential birth defects. Doxycycline Pregnancy And Lactation Text: This medication is Pregnancy Category D and not consider safe during pregnancy. It is also excreted in breast milk but is considered safe for shorter treatment courses. Topical Clindamycin Pregnancy And Lactation Text: This medication is Pregnancy Category B and is considered safe during pregnancy. It is unknown if it is excreted in breast milk. Topical Clindamycin Counseling: Patient counseled that this medication may cause skin irritation or allergic reactions.  In the event of skin irritation, the patient was advised to reduce the amount of the drug applied or use it less frequently.   The patient verbalized understanding of the proper use and possible adverse effects of clindamycin.  All of the patient's questions and concerns were addressed. Birth Control Pills Counseling: Birth Control Pill Counseling: I discussed with the patient the potential side effects of OCPs including but not limited to increased risk of stroke, heart attack, thrombophlebitis, deep venous thrombosis, hepatic adenomas, breast changes, GI upset, headaches, and depression.  The patient verbalized understanding of the proper use and possible adverse effects of OCPs. All of the patient's questions and concerns were addressed. Topical Retinoid counseling:  Patient advised to apply a pea-sized amount only at bedtime and wait 30 minutes after washing their face before applying.  If too drying, patient may add a non-comedogenic moisturizer. The patient verbalized understanding of the proper use and possible adverse effects of retinoids.  All of the patient's questions and concerns were addressed. High Dose Vitamin A Counseling: Side effects reviewed, pt to contact office should one occur. Topical Retinoid Pregnancy And Lactation Text: This medication is Pregnancy Category C. It is unknown if this medication is excreted in breast milk. Winlevi Counseling:  I discussed with the patient the risks of topical clascoterone including but not limited to erythema, scaling, itching, and stinging. Patient voiced their understanding. Sarecycline Counseling: Patient advised regarding possible photosensitivity and discoloration of the teeth, skin, lips, tongue and gums.  Patient instructed to avoid sunlight, if possible.  When exposed to sunlight, patients should wear protective clothing, sunglasses, and sunscreen.  The patient was instructed to call the office immediately if the following severe adverse effects occur:  hearing changes, easy bruising/bleeding, severe headache, or vision changes.  The patient verbalized understanding of the proper use and possible adverse effects of sarecycline.  All of the patient's questions and concerns were addressed. Birth Control Pills Pregnancy And Lactation Text: This medication should be avoided if pregnant and for the first 30 days post-partum. Azithromycin Pregnancy And Lactation Text: This medication is considered safe during pregnancy and is also secreted in breast milk.

## 2024-04-01 NOTE — DISCHARGE NOTE PROVIDER - NSDCMRMEDTOKEN_GEN_ALL_CORE_FT
aspirin 81 mg oral tablet, chewable: 1 tab(s) orally once a day  atorvastatin 80 mg oral tablet: 1 tab(s) orally once a day (at bedtime)  eplerenone 25 mg oral tablet: 1 tab(s) orally once a day  ferrous sulfate 324 mg (65 mg elemental iron) oral tablet: 1 tab(s) orally 2 times a day  Lantus 100 units/mL subcutaneous solution: 37 unit(s) subcutaneous once a day (at bedtime)  levothyroxine 75 mcg (0.075 mg) oral tablet: 1 tab(s) orally once a day  metoprolol succinate 100 mg oral tablet, extended release: 1 tab(s) orally once a day  NovoLOG 100 units/mL subcutaneous solution: subcutaneous Sliding scale MDD 40units  pantoprazole 40 mg oral delayed release tablet: 1 tab(s) orally once a day (before a meal)  torsemide 20 mg oral tablet: 1 tab(s) orally once a day   aspirin 81 mg oral tablet, chewable: 1 tab(s) orally once a day  atorvastatin 80 mg oral tablet: 1 tab(s) orally once a day (at bedtime)  Farxiga 5 mg oral tablet: 1 tab(s) orally once a day  ferrous sulfate 324 mg (65 mg elemental iron) oral tablet: 1 tab(s) orally 2 times a day  Lantus 100 units/mL subcutaneous solution: 37 unit(s) subcutaneous once a day (at bedtime)  levothyroxine 75 mcg (0.075 mg) oral tablet: 1 tab(s) orally once a day  metoprolol succinate 100 mg oral tablet, extended release: 1 tab(s) orally once a day  pantoprazole 40 mg oral delayed release tablet: 1 tab(s) orally once a day (before a meal)  torsemide 40 mg oral tablet: 1 tab(s) orally once a day   aspirin 81 mg oral tablet, chewable: 1 tab(s) orally once a day  atorvastatin 80 mg oral tablet: 1 tab(s) orally once a day (at bedtime)  Farxiga 5 mg oral tablet: 1 tab(s) orally once a day  ferrous sulfate 324 mg (65 mg elemental iron) oral tablet: 1 tab(s) orally 2 times a day  Lantus 100 units/mL subcutaneous solution: 20 unit(s) subcutaneous once a day (at bedtime)  levothyroxine 75 mcg (0.075 mg) oral tablet: 1 tab(s) orally once a day  metoprolol succinate 100 mg oral tablet, extended release: 1 tab(s) orally once a day  pantoprazole 40 mg oral delayed release tablet: 1 tab(s) orally once a day (before a meal)  torsemide 40 mg oral tablet: 1 tab(s) orally once a day

## 2024-04-01 NOTE — PHYSICAL THERAPY INITIAL EVALUATION ADULT - NSACTIVITYREC_GEN_A_PT
OOB to chair (rec regan recliner due to b/l LE lymphedema) 1-person assist +RW; amb to bedside commode/bathroom 1-person assist +RW (O2 as SpO2 appropriate); home exercise program.

## 2024-04-02 NOTE — PROGRESS NOTE ADULT - PROBLEM SELECTOR PLAN 1
- Acute on chronic systolic CHF exacerbation  - Pt reports poor fluid control at home  - IV Lasix increased to 80 mg BID as pt again requiring FiO2 overnight; home eplerenone/torsemide held  - C/w home Toprol XL for GDMT  - Fluid restrict 1500 mL  - No events on tele, d/c'ed  - TTE reviewed: EF 30%, LVH, mod-severe AS, moderate pulm HTN; decrease from EF 38% 06/2023  - LBBB on EKG is chronic and known, as per jarad pt had a left heart cath in 2022 which was benign, will c/w home ASA and statin on this admit.   - Cardio Dr. Lawrence group following - Acute on chronic systolic CHF exacerbation  - Pt reports poor fluid control at home  - continue IV Lasix 80 mg BID for now  - home torsemide held but will change to BID torsemide when time to change to PO  - holding home epleronine  - C/w home Toprol XL for GDMT  - Fluid restrict 1500 mL  - No events on tele, d/c'ed  - TTE reviewed: EF 30%, LVH, mod-severe AS, moderate pulm HTN; decrease from EF 38% 06/2023  - LBBB on EKG is chronic and known, as per jarad pt had a left heart cath in 2022 which was benign, will c/w home ASA and statin on this admit.   - Cardio Dr. Lawrence group following - DC planning once on PO diuretics

## 2024-04-02 NOTE — GOALS OF CARE CONVERSATION - ADVANCED CARE PLANNING - CONVERSATION DETAILS
STARS Writer met with pt and her partner/HCP Catrina Ladd. Reviewed patient's medical and social history as well as events leading to patient's hospitalization. Writer discussed patient's current diagnosis (CHF, aortic stenosis, LBBB, HTN, DM, CKD, hypothyroid), medical condition and management, prognosis, and life expectancy. Inquired about patient's wishes regarding extent of medical care to be provided including escalation of medical care into the ICU and use of vasopressor support. In addition, the writer inquired about thoughts regarding cardiopulmonary resuscitation, artificial nutrition and hydration including use of feeding tubes and IVF, antibiotics, and further investigative studies such as blood draws and radiology. Both  showed good  insight into medical condition. All questions answered. Writer recommended they discuss her wishes to have CPR and intubation, pt doesn't want to live on machines. Psychosocial support provided.

## 2024-04-02 NOTE — PROGRESS NOTE ADULT - PROBLEM SELECTOR PLAN 2
- Suspect LELIA/OHS  - Patient on RA this AM, using NC overnight  - Pulm Dr. Duarte consulted, recommending LELIA/OHS eval outpatient - Suspect LELIA/OHS  - Patient on RA this AM, using NC overnight  - Pulm Dr. Duarte consulted, recommending LELIA/OHS eval outpatient  - O2 qhs while inpatient

## 2024-04-02 NOTE — PROGRESS NOTE ADULT - SUBJECTIVE AND OBJECTIVE BOX
Date/Time Patient Seen:  		  Referring MD:   Data Reviewed	       Patient is a 73y old  Female who presents with a chief complaint of Acute on chronic systolic CHF (01 Apr 2024 16:37)      Subjective/HPI     PAST MEDICAL & SURGICAL HISTORY:  Hypertension    Diabetes    Lymphedema    H/O left bundle branch block    History of left bundle branch block (LBBB)    Systolic heart failure, chronic    Type 1 diabetes    H/O cataract  2020    History of surgical removal of meniscus of knee  left in 1971    Frozen shoulder  2000    H/O Achilles tendon repair  lengthened bilaterally, 2000    Fractured skull  1968          Medication list         MEDICATIONS  (STANDING):  aspirin  chewable 81 milliGRAM(s) Oral daily  atorvastatin 80 milliGRAM(s) Oral at bedtime  dextrose 5%. 1000 milliLiter(s) (50 mL/Hr) IV Continuous <Continuous>  dextrose 5%. 1000 milliLiter(s) (100 mL/Hr) IV Continuous <Continuous>  dextrose 50% Injectable 12.5 Gram(s) IV Push once  dextrose 50% Injectable 25 Gram(s) IV Push once  dextrose 50% Injectable 25 Gram(s) IV Push once  ferrous    sulfate 325 milliGRAM(s) Oral two times a day  furosemide   Injectable 80 milliGRAM(s) IV Push every 12 hours  glucagon  Injectable 1 milliGRAM(s) IntraMuscular once  heparin   Injectable 5000 Unit(s) SubCutaneous every 12 hours  insulin glargine Injectable (LANTUS) 15 Unit(s) SubCutaneous at bedtime  insulin lispro (ADMELOG) corrective regimen sliding scale   SubCutaneous three times a day before meals  levothyroxine 75 MICROGram(s) Oral daily  metoprolol succinate  milliGRAM(s) Oral daily  nystatin Powder 1 Application(s) Topical two times a day  pantoprazole    Tablet 40 milliGRAM(s) Oral before breakfast    MEDICATIONS  (PRN):  dextrose Oral Gel 15 Gram(s) Oral once PRN Blood Glucose LESS THAN 70 milliGRAM(s)/deciliter         Vitals log        ICU Vital Signs Last 24 Hrs  T(C): 36.2 (01 Apr 2024 20:42), Max: 36.9 (01 Apr 2024 12:19)  T(F): 97.2 (01 Apr 2024 20:42), Max: 98.5 (01 Apr 2024 12:19)  HR: 53 (01 Apr 2024 20:42) (53 - 57)  BP: 147/68 (01 Apr 2024 20:42) (112/63 - 147/68)  BP(mean): --  ABP: --  ABP(mean): --  RR: 18 (01 Apr 2024 20:42) (18 - 18)  SpO2: 98% (01 Apr 2024 20:42) (92% - 98%)    O2 Parameters below as of 01 Apr 2024 20:42  Patient On (Oxygen Delivery Method): nasal cannula  O2 Flow (L/min): 2               Input and Output:  I&O's Detail    31 Mar 2024 07:01  -  01 Apr 2024 07:00  --------------------------------------------------------  IN:  Total IN: 0 mL    OUT:    Voided (mL): 2800 mL  Total OUT: 2800 mL    Total NET: -2800 mL      01 Apr 2024 07:01  -  02 Apr 2024 05:24  --------------------------------------------------------  IN:  Total IN: 0 mL    OUT:    Voided (mL): 700 mL  Total OUT: 700 mL    Total NET: -700 mL          Lab Data                        11.4   5.74  )-----------( 180      ( 01 Apr 2024 07:10 )             37.3     04-01    145  |  104  |  51<H>  ----------------------------<  87  4.0   |  36<H>  |  2.40<H>    Ca    8.9      01 Apr 2024 07:10  Phos  4.2     04-01  Mg     2.3     04-01              Review of Systems	      Objective     Physical Examination    heart s1s2  lung dc bS  head nc      Pertinent Lab findings & Imaging      Frederic:  NO   Adequate UO     I&O's Detail    31 Mar 2024 07:01  -  01 Apr 2024 07:00  --------------------------------------------------------  IN:  Total IN: 0 mL    OUT:    Voided (mL): 2800 mL  Total OUT: 2800 mL    Total NET: -2800 mL      01 Apr 2024 07:01  -  02 Apr 2024 05:24  --------------------------------------------------------  IN:  Total IN: 0 mL    OUT:    Voided (mL): 700 mL  Total OUT: 700 mL    Total NET: -700 mL               Discussed with:     Cultures:	        Radiology

## 2024-04-02 NOTE — PROGRESS NOTE ADULT - PROBLEM SELECTOR PLAN 4
- Pt is on Lantus 37U at bedtime at home  - Lantus 15U qHS, ISS  - Well-controlled inpatient; A1C 6.8  - Monitor fingersticks qAC&HS

## 2024-04-02 NOTE — PROGRESS NOTE ADULT - SUBJECTIVE AND OBJECTIVE BOX
[facility] -- Howard Kimble, Cherise, Alecia Gonzalez, , Edgar Sutton  Office # [phone]    Follow Up:      Subjective/Observations:     REVIEW OF SYSTEMS: All other review of systems is negative unless indicated above  PAST MEDICAL & SURGICAL HISTORY:  Hypertension      Diabetes      Lymphedema      H/O left bundle branch block      History of left bundle branch block (LBBB)      Systolic heart failure, chronic      Type 1 diabetes      H/O cataract  2020      History of surgical removal of meniscus of knee  left in 1971      Frozen shoulder  2000      H/O Achilles tendon repair  lengthened bilaterally, 2000      Fractured skull  1968        MEDICATIONS  (STANDING):  aspirin  chewable 81 milliGRAM(s) Oral daily  atorvastatin 80 milliGRAM(s) Oral at bedtime  dextrose 5%. 1000 milliLiter(s) (50 mL/Hr) IV Continuous <Continuous>  dextrose 5%. 1000 milliLiter(s) (100 mL/Hr) IV Continuous <Continuous>  dextrose 50% Injectable 12.5 Gram(s) IV Push once  dextrose 50% Injectable 25 Gram(s) IV Push once  dextrose 50% Injectable 25 Gram(s) IV Push once  ferrous    sulfate 325 milliGRAM(s) Oral two times a day  furosemide   Injectable 80 milliGRAM(s) IV Push every 12 hours  glucagon  Injectable 1 milliGRAM(s) IntraMuscular once  heparin   Injectable 5000 Unit(s) SubCutaneous every 12 hours  insulin glargine Injectable (LANTUS) 15 Unit(s) SubCutaneous at bedtime  insulin lispro (ADMELOG) corrective regimen sliding scale   SubCutaneous three times a day before meals  levothyroxine 75 MICROGram(s) Oral daily  metoprolol succinate  milliGRAM(s) Oral daily  nystatin Powder 1 Application(s) Topical two times a day  pantoprazole    Tablet 40 milliGRAM(s) Oral before breakfast    MEDICATIONS  (PRN):  dextrose Oral Gel 15 Gram(s) Oral once PRN Blood Glucose LESS THAN 70 milliGRAM(s)/deciliter    Allergies    Ativan (Rash; Urticaria)  penicillins (Hives)    Intolerances      Vital Signs Last 24 Hrs  T(C): 36.5 (02 Apr 2024 05:27), Max: 36.9 (01 Apr 2024 12:19)  T(F): 97.7 (02 Apr 2024 05:27), Max: 98.5 (01 Apr 2024 12:19)  HR: 54 (02 Apr 2024 05:27) (53 - 57)  BP: 142/65 (02 Apr 2024 05:27) (112/63 - 147/68)  BP(mean): --  RR: 18 (02 Apr 2024 05:27) (18 - 18)  SpO2: 95% (02 Apr 2024 05:27) (92% - 98%)    Parameters below as of 02 Apr 2024 05:27  Patient On (Oxygen Delivery Method): nasal cannula  O2 Flow (L/min): 1    I&O's Summary    01 Apr 2024 07:01  -  02 Apr 2024 07:00  --------------------------------------------------------  IN: 0 mL / OUT: 700 mL / NET: -700 mL        PHYSICAL EXAM:  TELE:   Constitutional: NAD, awake and alert, well-developed  HEENT: Moist Mucous Membranes, Anicteric  Pulmonary: Non-labored, breath sounds are clear bilaterally, No wheezing, rales or rhonchi  Cardiovascular: Regular, S1 and S2, No murmurs, rubs, gallops or clicks  Gastrointestinal: Bowel Sounds present, soft, nontender.   Lymph: No peripheral edema. No lymphadenopathy.  Skin: No visible rashes or ulcers.  Psych:  Mood & affect appropriate  LABS: All Labs Reviewed:                        12.0   5.98  )-----------( 180      ( 02 Apr 2024 06:25 )             39.6                         11.4   5.74  )-----------( 180      ( 01 Apr 2024 07:10 )             37.3                         11.6   5.48  )-----------( 174      ( 31 Mar 2024 09:00 )             37.9     02 Apr 2024 06:25    143    |  101    |  47     ----------------------------<  147    3.7     |  34     |  2.30   01 Apr 2024 07:10    145    |  104    |  51     ----------------------------<  87     4.0     |  36     |  2.40   31 Mar 2024 09:00    142    |  104    |  51     ----------------------------<  124    3.9     |  29     |  2.30     Ca    8.1        02 Apr 2024 06:25  Ca    8.9        01 Apr 2024 07:10  Ca    8.4        31 Mar 2024 09:00  Phos  3.4       02 Apr 2024 06:25  Phos  4.2       01 Apr 2024 07:10  Phos  3.5       31 Mar 2024 09:00  Mg     2.3       02 Apr 2024 06:25  Mg     2.3       01 Apr 2024 07:10  Mg     2.4       31 Mar 2024 09:00    TPro  7.2    /  Alb  3.3    /  TBili  0.9    /  DBili  x      /  AST  17     /  ALT  13     /  AlkPhos  67     30 Mar 2024 08:21          12 Lead ECG:   Ventricular Rate 57 BPM    Atrial Rate 57 BPM    P-R Interval 312 ms    QRS Duration 168 ms    Q-T Interval 520 ms    QTC Calculation(Bazett) 506 ms    P Axis 38 degrees    R Axis 212 degrees    T Axis -31 degrees    Diagnosis Line Sinus bradycardia with 1st degree AV block  Right superior axis deviation  Nonspecific intraventricular block  Abnormal ECG  When compared with ECG of 09-JAN-2024 09:46,  Nonspecific T wave abnormality now evident in Inferior leads  Nonspecific T wave abnormality, improved in Lateral leads  Confirmed by Dale Lawrence MD ([id number]) on 3/31/2024 2:43:25 PM (03-29-24 @ 17:08)      TRANSTHORACIC ECHOCARDIOGRAM REPORT  ________________________________________________________________________________                                      _______       Pt. Name:       EVELYN LOPEZ Study Date:    4/1/2024  MRN:            [id number]         YOB: 1950  Accession #:    [id number]        Age:           73 years  Account#:       [id number]       Gender:        F  Heart Rate:                      Height:        71.00 in (180.34 cm)  Rhythm:                          Weight:        330.69 lb (150.00 kg)  Blood Pressure: 116/68 mmHg      BSA/BMI:       2.61 m² / 46.12 kg/m²  ________________________________________________________________________________________  Referring Physician:    [id number] Luis Enrique Luo  Interpreting Physician: Pia Sutton MD  Primary Sonographer:    Marlon Chen    CPT:               ECHO TTE WO CON COMP W DOPP - 93002.m  Indication(s):     Dyspnea, unspecified - R06.00  Procedure:         Transthoracic echocardiogram with 2-D, M-mode and complete                     spectral and color flow Doppler.  Ordering Location: [facility]  Admission Status:  Inpatient  Study Information: Image quality for this study is technically difficult.    _______________________________________________________________________________________     CONCLUSIONS:      1. Technically difficult image quality.   2. Left ventricular systolic function is moderately to severely decreased with an ejection fraction of 30 % by Nunes's method of disks.   3. Mildto moderate left ventricular hypertrophy.   4. The right ventricle is not well visualized. Based on visual assessment, the right ventricle appears mildly enlarged. mildly reduced systolic function.   5. The left atrium is mildly dilated.   6. Trace aortic regurgitation.   7. Mild mitral regurgitation.   8. Moderate to severe aortic stenosis. There is a Vmax of 3.66m/s, Mean gradient of 26mmHg and a DI of 0.19. The JUAN DANIEL is calculated as 0.61cm2 by continuity. This likely represents low flow low gradient AS.   9. Estimated pulmonary artery systolic pressure is 59 mmHg, consistent with moderate pulmonary hypertension.  10. The inferior vena cava is dilated measuring 2.60 cm in diameter, (dilated >2.1cm) with normal inspiratory collapse (normal >50%) consistent with mildly elevated right atrial pressure (~8, range 5-10mmHg).    ________________________________________________________________________________________  FINDINGS:     Left Ventricle:  Left ventricular systolic function is moderately to severely decreased with a calculated ejection fraction of 30 % by the Nunes's biplane method of disks. Mild to moderate left ventricular hypertrophy.     Right Ventricle:  The right ventricle is not well visualized. Based on visual assessment, the right ventricle appears mildly enlarged. The right ventricular cavity is mildly enlarged in size and mildly reduced systolic function. Tricuspid annular plane systolic excursion (TAPSE) is 2.4 cm (normal >=1.7 cm).     Left Atrium:  The left atrium is mildly dilated with an indexed volume of 34.20 ml/m².     Right Atrium:  The right atrium is normal in size.     Aortic Valve:  There is calcification of the aortic valve leaflets. There is moderate to severe aortic stenosis. The peak transaorticvelocity is 3.66 m/s, peak transaortic gradient is 53.6 mmHg and mean transaortic gradient is 26.0 mmHg with an LVOT/aortic valve VTI ratio of 0.19. The aortic valve area is estimated at 0.61 cm² by the continuity equation and 1.16 cm² by 2D planimetry. There is trace aortic regurgitation.     Mitral Valve:  There is mild calcification of the mitral valve annulus. Mitral valve leaflets are diffusely calcified. There is mild mitral regurgitation.     Tricuspid Valve:  There is mild tricuspid regurgitation. Estimated pulmonary artery systolic pressure is 59 mmHg, consistent with moderate pulmonary hypertension.     Pulmonic Valve:  The pulmonic valve was not well visualized. There is trace pulmonic regurgitation.     Aorta:  The aortic root at the sinuses of Valsalva is normal in size, measuring 2.90 cm (indexed 1.11 cm/m²).     Systemic Veins:  The inferior vena cava is dilated measuring 2.60 cm in diameter, (dilated >2.1cm) with normal inspiratory collapse (normal >50%) consistent with mildly elevated right atrial pressure (~8, range 5-10mmHg).  ____________________________________________________________________  QUANTITATIVE DATA:  Left Ventricle Measurements: (Indexed to BSA)     IVSd (2D):   1.3 cm  LVPWd (2D):  1.2 cm  LVIDd (2D):  5.4 cm  LVIDs (2D):  4.7 cm  LV Mass:     284 g  108.7 g/m²  LV Vol d, MOD A2C: 138.0 ml 52.85 ml/m²  LV Vol d, MOD A4C: 114.0 ml 43.65 ml/m²  LV Vol d, MOD BP:  128.3 ml 49.14 ml/m²  LV Vol s, MOD A2C: 86.9 ml  33.28 ml/m²  LV Vol s, MOD A4C: 88.5ml  33.89 ml/m²  LV Vol s, MOD BP:  89.5 ml  34.28 ml/m²  LVOT SV MOD BP:    38.8 ml  LV EF% MOD BP:     30 %     MV E Vmax:    0.75 m/s  MV A Vmax:    0.97 m/s  MV E/A:       0.77  e' lateral:   8.16 cm/s  e' medial:    3.59 cm/s  E/e' lateral: 9.18  E/e' medial:  20.86  E/e' Average: 12.75  MV DT:        268 msec    Aorta Measurements: (Normal range) (Indexed to BSA)     Sinuses of Valsalva: 2.90 cm (2.7 - 3.3 cm)       Left Atrium Measurements: (Indexed to BSA)  LA Diam 2D: 4.90 cm    Right Ventricle Measurements:     TAPSE:            2.4 cm  RV Base (RVID1):  4.9 cm  RV Mid (RVID2):   4.1 cm  RV Major (RVID3): 8.3 cm       LVOT / RVOT/ Qp/Qs Data: (Indexed to BSA)  LVOT Diameter: 2.00 cm  LVOT Vmax:     0.67 m/s  LVOT VTI:      19.00 cm  LVOT SV:       59.7 ml  22.86 ml/m²    Aortic Valve Measurements:  AV Vmax:                3.7 m/s  AV Peak Gradient:       53.6 mmHg  AV Mean Gradient:       26.0 mmHg  AV VTI:                 97.8 cm  AV VTI Ratio:           0.19  AoV EOA, Contin:      0.61 cm²  AoV EOA, Contin i:      0.23 cm²/m²  AoV area, 2D planim:    1.16 cm²  AoV Dimensionless Index 0.19    Mitral Valve Measurements:     MV E Vmax: 0.7 m/s  MV A Vmax: 1.0 m/s  MV E/A:    0.8    Tricuspid Valve Measurements:     TR Vmax:          3.6 m/s  TR Peak Gradient: 51.3 mmHg  RA Pressure:      8 mmHg  PASP:             59 mmHg    ________________________________________________________________________________________  Electronically signed on 4/1/2024 at 2:34:33 PM by Pia Sutton MD         *** Final ***      [facility] -- Howard Kimble, Carlos Luong Savella, , Edgar Sutton  Office # [phone]    Follow Up:  ADHF, Edema    Subjective/Observations: Tolerating RA this morning.  Admits to be feeling better and edema improving.  States, she has been diuresing.  Denies CP or palpitations    REVIEW OF SYSTEMS: All other review of systems is negative unless indicated above  PAST MEDICAL & SURGICAL HISTORY:  Hypertension  Diabetes  Lymphedema  H/O left bundle branch block  History of left bundle branch block (LBBB)  Systolic heart failure, chronic  Type 1 diabetes  H/O cataract  2020  History of surgical removal of meniscus of knee  left in 1971  Frozen shoulder  2000  H/O Achilles tendon repair  lengthened bilaterally, 2000  Fractured skull  1968    MEDICATIONS  (STANDING):  aspirin  chewable 81 milliGRAM(s) Oral daily  atorvastatin 80 milliGRAM(s) Oral at bedtime  dextrose 5%. 1000 milliLiter(s) (50 mL/Hr) IV Continuous <Continuous>  dextrose 5%. 1000 milliLiter(s) (100 mL/Hr) IV Continuous <Continuous>  dextrose 50% Injectable 12.5 Gram(s) IV Push once  dextrose 50% Injectable 25 Gram(s) IV Push once  dextrose 50% Injectable 25 Gram(s) IV Push once  ferrous    sulfate 325 milliGRAM(s) Oral two times a day  furosemide   Injectable 80 milliGRAM(s) IV Push every 12 hours  glucagon  Injectable 1 milliGRAM(s) IntraMuscular once  heparin   Injectable 5000 Unit(s) SubCutaneous every 12 hours  insulin glargine Injectable (LANTUS) 15 Unit(s) SubCutaneous at bedtime  insulin lispro (ADMELOG) corrective regimen sliding scale   SubCutaneous three times a day before meals  levothyroxine 75 MICROGram(s) Oral daily  metoprolol succinate  milliGRAM(s) Oral daily  nystatin Powder 1 Application(s) Topical two times a day  pantoprazole    Tablet 40 milliGRAM(s) Oral before breakfast    MEDICATIONS  (PRN):  dextrose Oral Gel 15 Gram(s) Oral once PRN Blood Glucose LESS THAN 70 milliGRAM(s)/deciliter    Allergies    Ativan (Rash; Urticaria)  penicillins (Hives)    Intolerances    Vital Signs Last 24 Hrs  T(C): 36.5 (02 Apr 2024 05:27), Max: 36.9 (01 Apr 2024 12:19)  T(F): 97.7 (02 Apr 2024 05:27), Max: 98.5 (01 Apr 2024 12:19)  HR: 54 (02 Apr 2024 05:27) (53 - 57)  BP: 142/65 (02 Apr 2024 05:27) (112/63 - 147/68)  BP(mean): --  RR: 18 (02 Apr 2024 05:27) (18 - 18)  SpO2: 95% (02 Apr 2024 05:27) (92% - 98%)    Parameters below as of 02 Apr 2024 05:27  Patient On (Oxygen Delivery Method): nasal cannula  O2 Flow (L/min): 1    I&O's Summary    01 Apr 2024 07:01  -  02 Apr 2024 07:00  --------------------------------------------------------  IN: 0 mL / OUT: 700 mL / NET: -700 mL    PHYSICAL EXAM:  TELE: Not on tele  Constitutional: NAD, awake and alert  HEENT: Moist Mucous Membranes, Anicteric  Pulmonary: Non-labored, breath sounds are diminished bilaterally, +wheezing, no rales or rhonchi  Cardiovascular: Regular, S1 and S2, +murmurs, no rubs, gallops or clicks  Gastrointestinal: Bowel Sounds present, soft, nontender.   Lymph: 2-3+ BLE edema. No lymphadenopathy.  Skin: + rashes from eczema no ulcers.  Psych:  Mood & affect appropriate    LABS: All Labs Reviewed:                        12.0   5.98  )-----------( 180      ( 02 Apr 2024 06:25 )             39.6                         11.4   5.74  )-----------( 180      ( 01 Apr 2024 07:10 )             37.3                         11.6   5.48  )-----------( 174      ( 31 Mar 2024 09:00 )             37.9     02 Apr 2024 06:25    143    |  101    |  47     ----------------------------<  147    3.7     |  34     |  2.30   01 Apr 2024 07:10    145    |  104    |  51     ----------------------------<  87     4.0     |  36     |  2.40   31 Mar 2024 09:00    142    |  104    |  51     ----------------------------<  124    3.9     |  29     |  2.30     Ca    8.1        02 Apr 2024 06:25  Ca    8.9        01 Apr 2024 07:10  Ca    8.4        31 Mar 2024 09:00  Phos  3.4       02 Apr 2024 06:25  Phos  4.2       01 Apr 2024 07:10  Phos  3.5       31 Mar 2024 09:00  Mg     2.3       02 Apr 2024 06:25  Mg     2.3       01 Apr 2024 07:10  Mg     2.4       31 Mar 2024 09:00    TPro  7.2    /  Alb  3.3    /  TBili  0.9    /  DBili  x      /  AST  17     /  ALT  13     /  AlkPhos  67     30 Mar 2024 08:21          12 Lead ECG:   Ventricular Rate 57 BPM    Atrial Rate 57 BPM    P-R Interval 312 ms    QRS Duration 168 ms    Q-T Interval 520 ms    QTC Calculation(Bazett) 506 ms    P Axis 38 degrees    R Axis 212 degrees    T Axis -31 degrees    Diagnosis Line Sinus bradycardia with 1st degree AV block  Right superior axis deviation  Nonspecific intraventricular block  Abnormal ECG  When compared with ECG of 09-JAN-2024 09:46,  Nonspecific T wave abnormality now evident in Inferior leads  Nonspecific T wave abnormality, improved in Lateral leads  Confirmed by Dale Lawrence MD ([id number]) on 3/31/2024 2:43:25 PM (03-29-24 @ 17:08)      TRANSTHORACIC ECHOCARDIOGRAM REPORT  ________________________________________________________________________________                                      _______       Pt. Name:       EVELYN LOPEZ Study Date:    4/1/2024  MRN:            [id number]         YOB: 1950  Accession #:    [id number]        Age:           73 years  Account#:       [id number]       Gender:        F  Heart Rate:                      Height:        71.00 in (180.34 cm)  Rhythm:                          Weight:        330.69 lb (150.00 kg)  Blood Pressure: 116/68 mmHg      BSA/BMI:       2.61 m² / 46.12 kg/m²  ________________________________________________________________________________________  Referring Physician:    [id number] Luis Enrique Luo  Interpreting Physician: Pia Sutton MD  Primary Sonographer:    Marlon Chen    CPT:               ECHO TTE WO CON COMP W DOPP - 20044.m  Indication(s):     Dyspnea, unspecified - R06.00  Procedure:         Transthoracic echocardiogram with 2-D, M-mode and complete                     spectral and color flow Doppler.  Ordering Location: [facility]  Admission Status:  Inpatient  Study Information: Image quality for this study is technically difficult.    _______________________________________________________________________________________     CONCLUSIONS:      1. Technically difficult image quality.   2. Left ventricular systolic function is moderately to severely decreased with an ejection fraction of 30 % by Nunes's method of disks.   3. Mildto moderate left ventricular hypertrophy.   4. The right ventricle is not well visualized. Based on visual assessment, the right ventricle appears mildly enlarged. mildly reduced systolic function.   5. The left atrium is mildly dilated.   6. Trace aortic regurgitation.   7. Mild mitral regurgitation.   8. Moderate to severe aortic stenosis. There is a Vmax of 3.66m/s, Mean gradient of 26mmHg and a DI of 0.19. The JUAN DANIEL is calculated as 0.61cm2 by continuity. This likely represents low flow low gradient AS.   9. Estimated pulmonary artery systolic pressure is 59 mmHg, consistent with moderate pulmonary hypertension.  10. The inferior vena cava is dilated measuring 2.60 cm in diameter, (dilated >2.1cm) with normal inspiratory collapse (normal >50%) consistent with mildly elevated right atrial pressure (~8, range 5-10mmHg).    ________________________________________________________________________________________  FINDINGS:     Left Ventricle:  Left ventricular systolic function is moderately to severely decreased with a calculated ejection fraction of 30 % by the Nunes's biplane method of disks. Mild to moderate left ventricular hypertrophy.     Right Ventricle:  The right ventricle is not well visualized. Based on visual assessment, the right ventricle appears mildly enlarged. The right ventricular cavity is mildly enlarged in size and mildly reduced systolic function. Tricuspid annular plane systolic excursion (TAPSE) is 2.4 cm (normal >=1.7 cm).     Left Atrium:  The left atrium is mildly dilated with an indexed volume of 34.20 ml/m².     Right Atrium:  The right atrium is normal in size.     Aortic Valve:  There is calcification of the aortic valve leaflets. There is moderate to severe aortic stenosis. The peak transaorticvelocity is 3.66 m/s, peak transaortic gradient is 53.6 mmHg and mean transaortic gradient is 26.0 mmHg with an LVOT/aortic valve VTI ratio of 0.19. The aortic valve area is estimated at 0.61 cm² by the continuity equation and 1.16 cm² by 2D planimetry. There is trace aortic regurgitation.     Mitral Valve:  There is mild calcification of the mitral valve annulus. Mitral valve leaflets are diffusely calcified. There is mild mitral regurgitation.     Tricuspid Valve:  There is mild tricuspid regurgitation. Estimated pulmonary artery systolic pressure is 59 mmHg, consistent with moderate pulmonary hypertension.     Pulmonic Valve:  The pulmonic valve was not well visualized. There is trace pulmonic regurgitation.     Aorta:  The aortic root at the sinuses of Valsalva is normal in size, measuring 2.90 cm (indexed 1.11 cm/m²).     Systemic Veins:  The inferior vena cava is dilated measuring 2.60 cm in diameter, (dilated >2.1cm) with normal inspiratory collapse (normal >50%) consistent with mildly elevated right atrial pressure (~8, range 5-10mmHg).  ____________________________________________________________________  QUANTITATIVE DATA:  Left Ventricle Measurements: (Indexed to BSA)     IVSd (2D):   1.3 cm  LVPWd (2D):  1.2 cm  LVIDd (2D):  5.4 cm  LVIDs (2D):  4.7 cm  LV Mass:     284 g  108.7 g/m²  LV Vol d, MOD A2C: 138.0 ml 52.85 ml/m²  LV Vol d, MOD A4C: 114.0 ml 43.65 ml/m²  LV Vol d, MOD BP:  128.3 ml 49.14 ml/m²  LV Vol s, MOD A2C: 86.9 ml  33.28 ml/m²  LV Vol s, MOD A4C: 88.5ml  33.89 ml/m²  LV Vol s, MOD BP:  89.5 ml  34.28 ml/m²  LVOT SV MOD BP:    38.8 ml  LV EF% MOD BP:     30 %     MV E Vmax:    0.75 m/s  MV A Vmax:    0.97 m/s  MV E/A:       0.77  e' lateral:   8.16 cm/s  e' medial:    3.59 cm/s  E/e' lateral: 9.18  E/e' medial:  20.86  E/e' Average: 12.75  MV DT:        268 msec    Aorta Measurements: (Normal range) (Indexed to BSA)     Sinuses of Valsalva: 2.90 cm (2.7 - 3.3 cm)       Left Atrium Measurements: (Indexed to BSA)  LA Diam 2D: 4.90 cm    Right Ventricle Measurements:     TAPSE:            2.4 cm  RV Base (RVID1):  4.9 cm  RV Mid (RVID2):   4.1 cm  RV Major (RVID3): 8.3 cm       LVOT / RVOT/ Qp/Qs Data: (Indexed to BSA)  LVOT Diameter: 2.00 cm  LVOT Vmax:     0.67 m/s  LVOT VTI:      19.00 cm  LVOT SV:       59.7 ml  22.86 ml/m²    Aortic Valve Measurements:  AV Vmax:                3.7 m/s  AV Peak Gradient:       53.6 mmHg  AV Mean Gradient:       26.0 mmHg  AV VTI:                 97.8 cm  AV VTI Ratio:           0.19  AoV EOA, Contin:      0.61 cm²  AoV EOA, Contin i:      0.23 cm²/m²  AoV area, 2D planim:    1.16 cm²  AoV Dimensionless Index 0.19    Mitral Valve Measurements:     MV E Vmax: 0.7 m/s  MV A Vmax: 1.0 m/s  MV E/A:    0.8    Tricuspid Valve Measurements:     TR Vmax:          3.6 m/s  TR Peak Gradient: 51.3 mmHg  RA Pressure:      8 mmHg  PASP:             59 mmHg    ________________________________________________________________________________________  Electronically signed on 4/1/2024 at 2:34:33 PM by Pia Sutton MD         *** Final ***

## 2024-04-02 NOTE — PROGRESS NOTE ADULT - ASSESSMENT
73F with a PMHx of Chronic Systolic CHF (last TTE with EF 38%), moderate aortic stenosis, known LBBB, HTN, DM1, CKD4, hypothyroidism, and chronic lymphedema admitted with acute on chronic systolic CHF and DUNCAN on CKD4.  73F with a PMHx of Chronic Systolic CHF (last TTE with EF 38%), moderate aortic stenosis, known LBBB, HTN, DM1, CKD4, hypothyroidism, and chronic lymphedema admitted with acute on chronic systolic CHF and DUNCAN on CKD4 - Cardiorenal syndrome.

## 2024-04-02 NOTE — PROGRESS NOTE ADULT - SUBJECTIVE AND OBJECTIVE BOX
Patient is a 73y old  Female who presents with a chief complaint of Acute on chronic systolic CHF (02 Apr 2024 08:44)      INTERVAL HPI/OVERNIGHT EVENTS: No acute overnight events. Pt was seen and examined at bedside. Pt states that her sob has improved, and she has only used NC overnight, feels comfortable on RA now during exam.  Pt denies headache, dizziness, lightheadedness, fever, chills, body aches, CP, SOB, palpitations, abdominal pain, n/v, numbness/tingling.  No other complaints at this time.     MEDICATIONS  (STANDING):  aspirin  chewable 81 milliGRAM(s) Oral daily  atorvastatin 80 milliGRAM(s) Oral at bedtime  dextrose 5%. 1000 milliLiter(s) (50 mL/Hr) IV Continuous <Continuous>  dextrose 5%. 1000 milliLiter(s) (100 mL/Hr) IV Continuous <Continuous>  dextrose 50% Injectable 25 Gram(s) IV Push once  dextrose 50% Injectable 25 Gram(s) IV Push once  dextrose 50% Injectable 12.5 Gram(s) IV Push once  ferrous    sulfate 325 milliGRAM(s) Oral two times a day  furosemide   Injectable 80 milliGRAM(s) IV Push every 12 hours  glucagon  Injectable 1 milliGRAM(s) IntraMuscular once  heparin   Injectable 5000 Unit(s) SubCutaneous every 12 hours  insulin glargine Injectable (LANTUS) 15 Unit(s) SubCutaneous at bedtime  insulin lispro (ADMELOG) corrective regimen sliding scale   SubCutaneous three times a day before meals  levothyroxine 75 MICROGram(s) Oral daily  metoprolol succinate  milliGRAM(s) Oral daily  nystatin Powder 1 Application(s) Topical two times a day  pantoprazole    Tablet 40 milliGRAM(s) Oral before breakfast    MEDICATIONS  (PRN):  dextrose Oral Gel 15 Gram(s) Oral once PRN Blood Glucose LESS THAN 70 milliGRAM(s)/deciliter      Allergies    Ativan (Rash; Urticaria)  penicillins (Hives)    Intolerances        REVIEW OF SYSTEMS:  CONSTITUTIONAL: No fever or chills  HEENT:  No headache, no sore throat  RESPIRATORY: No cough, wheezing, or shortness of breath  CARDIOVASCULAR: No chest pain, palpitations  GASTROINTESTINAL: No abd pain, nausea, vomiting, or diarrhea  GENITOURINARY: No dysuria    Vital Signs Last 24 Hrs  T(C): 36.5 (02 Apr 2024 05:27), Max: 36.9 (01 Apr 2024 12:19)  T(F): 97.7 (02 Apr 2024 05:27), Max: 98.5 (01 Apr 2024 12:19)  HR: 54 (02 Apr 2024 05:27) (53 - 54)  BP: 142/65 (02 Apr 2024 05:27) (112/63 - 147/68)  BP(mean): --  RR: 18 (02 Apr 2024 05:27) (18 - 18)  SpO2: 95% (02 Apr 2024 05:27) (95% - 98%)    Parameters below as of 02 Apr 2024 05:27  Patient On (Oxygen Delivery Method): nasal cannula  O2 Flow (L/min): 1      PHYSICAL EXAM:  GENERAL: NAD, well-appearing, on RA  HEENT:  anicteric, moist mucous membranes  CHEST/LUNG:  Difficult auscultation d/t body habitus - decreased breath sounds BL, no wheezing, rales, rhonchi appreciated  HEART:  RRR, loud whooshing pansystolic murmur heard in all areas, grade 5/6, unable to appreciate S1/S2  ABDOMEN:  obese, BS+, soft, nontender, nondistended  EXTREMITIES: Severe, 4+ pitting edema in B/L LE with multiple excoriations, venous stasis dermatitis in BLLE  NERVOUS SYSTEM: AAO x3. Answers questions and follows commands appropriately  PSYCH: normal affect    LABS:                        12.0   5.98  )-----------( 180      ( 02 Apr 2024 06:25 )             39.6     CBC Full  -  ( 02 Apr 2024 06:25 )  WBC Count : 5.98 K/uL  Hemoglobin : 12.0 g/dL  Hematocrit : 39.6 %  Platelet Count - Automated : 180 K/uL  Mean Cell Volume : 92.5 fl  Mean Cell Hemoglobin : 28.0 pg  Mean Cell Hemoglobin Concentration : 30.3 gm/dL  Auto Neutrophil # : x  Auto Lymphocyte # : x  Auto Monocyte # : x  Auto Eosinophil # : x  Auto Basophil # : x  Auto Neutrophil % : x  Auto Lymphocyte % : x  Auto Monocyte % : x  Auto Eosinophil % : x  Auto Basophil % : x    02 Apr 2024 06:25    143    |  101    |  47     ----------------------------<  147    3.7     |  34     |  2.30     Ca    8.1        02 Apr 2024 06:25  Phos  3.4       02 Apr 2024 06:25  Mg     2.3       02 Apr 2024 06:25        Urinalysis Basic - ( 02 Apr 2024 06:25 )    Color: x / Appearance: x / SG: x / pH: x  Gluc: 147 mg/dL / Ketone: x  / Bili: x / Urobili: x   Blood: x / Protein: x / Nitrite: x   Leuk Esterase: x / RBC: x / WBC x   Sq Epi: x / Non Sq Epi: x / Bacteria: x      CAPILLARY BLOOD GLUCOSE      POCT Blood Glucose.: 142 mg/dL (02 Apr 2024 07:35)  POCT Blood Glucose.: 177 mg/dL (01 Apr 2024 21:08)  POCT Blood Glucose.: 196 mg/dL (01 Apr 2024 18:11)  POCT Blood Glucose.: 144 mg/dL (01 Apr 2024 12:27)          RADIOLOGY & ADDITIONAL TESTS:    < from: TTE W or WO Ultrasound Enhancing Agent (04.01.24 @ 10:59) >  CONCLUSIONS:      1. Technically difficult image quality.   2. Left ventricular systolic function is moderately to severely decreased with an ejection fraction of 30 % by Nunes's method of disks.   3. Mildto moderate left ventricular hypertrophy.   4. The right ventricle is not well visualized. Based on visual assessment, the right ventricle appears mildly enlarged. mildly reduced systolic function.   5. The left atrium is mildly dilated.   6. Trace aortic regurgitation.   7. Mild mitral regurgitation.   8. Moderate to severe aortic stenosis. There is a Vmax of 3.66m/s, Mean gradient of 26mmHg and a DI of 0.19. The JUAN DANIEL is calculated as 0.61cm2 by continuity. This likely represents low flow low gradient AS.   9. Estimated pulmonary artery systolic pressure is 59 mmHg, consistent with moderate pulmonary hypertension.  10. The inferior vena cava is dilated measuring 2.60 cm in diameter, (dilated >2.1cm) with normal inspiratory collapse (normal >50%) consistent with mildly elevated right atrial pressure (~8, range 5-10mmHg).    < end of copied text >      Consultant(s) Notes Reviewed:  [x] YES  [ ] NO     Patient is a 73y old  Female who presents with a chief complaint of Acute on chronic systolic CHF (02 Apr 2024 08:44)      INTERVAL HPI/OVERNIGHT EVENTS: No acute overnight events. Pt was seen and examined at bedside. Pt states that her sob has improved, and she has only used NC overnight, feels comfortable on RA now during exam.  Pt denies headache, dizziness, lightheadedness, fever, chills, body aches, CP, SOB, palpitations, abdominal pain, n/v, numbness/tingling. No other complaints at this time. Wants to know when she can be discharged.    MEDICATIONS  (STANDING):  aspirin  chewable 81 milliGRAM(s) Oral daily  atorvastatin 80 milliGRAM(s) Oral at bedtime  dextrose 5%. 1000 milliLiter(s) (50 mL/Hr) IV Continuous <Continuous>  dextrose 5%. 1000 milliLiter(s) (100 mL/Hr) IV Continuous <Continuous>  dextrose 50% Injectable 25 Gram(s) IV Push once  dextrose 50% Injectable 25 Gram(s) IV Push once  dextrose 50% Injectable 12.5 Gram(s) IV Push once  ferrous    sulfate 325 milliGRAM(s) Oral two times a day  furosemide   Injectable 80 milliGRAM(s) IV Push every 12 hours  glucagon  Injectable 1 milliGRAM(s) IntraMuscular once  heparin   Injectable 5000 Unit(s) SubCutaneous every 12 hours  insulin glargine Injectable (LANTUS) 15 Unit(s) SubCutaneous at bedtime  insulin lispro (ADMELOG) corrective regimen sliding scale   SubCutaneous three times a day before meals  levothyroxine 75 MICROGram(s) Oral daily  metoprolol succinate  milliGRAM(s) Oral daily  nystatin Powder 1 Application(s) Topical two times a day  pantoprazole    Tablet 40 milliGRAM(s) Oral before breakfast    MEDICATIONS  (PRN):  dextrose Oral Gel 15 Gram(s) Oral once PRN Blood Glucose LESS THAN 70 milliGRAM(s)/deciliter      Allergies    Ativan (Rash; Urticaria)  penicillins (Hives)    Intolerances        REVIEW OF SYSTEMS:  CONSTITUTIONAL: No fever or chills  HEENT:  No headache, no sore throat  RESPIRATORY: No cough, wheezing, or shortness of breath  CARDIOVASCULAR: No chest pain, palpitations  GASTROINTESTINAL: No abd pain, nausea, vomiting, or diarrhea  GENITOURINARY: No dysuria    Vital Signs Last 24 Hrs  T(C): 36.5 (02 Apr 2024 05:27), Max: 36.9 (01 Apr 2024 12:19)  T(F): 97.7 (02 Apr 2024 05:27), Max: 98.5 (01 Apr 2024 12:19)  HR: 54 (02 Apr 2024 05:27) (53 - 54)  BP: 142/65 (02 Apr 2024 05:27) (112/63 - 147/68)  BP(mean): --  RR: 18 (02 Apr 2024 05:27) (18 - 18)  SpO2: 95% (02 Apr 2024 05:27) (95% - 98%)    Parameters below as of 02 Apr 2024 05:27  Patient On (Oxygen Delivery Method): nasal cannula  O2 Flow (L/min): 1      PHYSICAL EXAM:  GENERAL: NAD, well-appearing, on RA  HEENT:  anicteric, moist mucous membranes  CHEST/LUNG:  Difficult auscultation d/t body habitus - decreased breath sounds BL, no wheezing, rales, rhonchi appreciated  HEART:  RRR, loud whooshing pansystolic murmur heard in all areas, grade 5/6, unable to appreciate S1/S2  ABDOMEN:  obese, BS+, soft, nontender, nondistended  EXTREMITIES: Severe, 4+ pitting edema in B/L LE with multiple excoriations, venous stasis dermatitis in BLLE  NERVOUS SYSTEM: AAO x3. Answers questions and follows commands appropriately  PSYCH: normal affect    LABS:                        12.0   5.98  )-----------( 180      ( 02 Apr 2024 06:25 )             39.6     CBC Full  -  ( 02 Apr 2024 06:25 )  WBC Count : 5.98 K/uL  Hemoglobin : 12.0 g/dL  Hematocrit : 39.6 %  Platelet Count - Automated : 180 K/uL  Mean Cell Volume : 92.5 fl  Mean Cell Hemoglobin : 28.0 pg  Mean Cell Hemoglobin Concentration : 30.3 gm/dL  Auto Neutrophil # : x  Auto Lymphocyte # : x  Auto Monocyte # : x  Auto Eosinophil # : x  Auto Basophil # : x  Auto Neutrophil % : x  Auto Lymphocyte % : x  Auto Monocyte % : x  Auto Eosinophil % : x  Auto Basophil % : x    02 Apr 2024 06:25    143    |  101    |  47     ----------------------------<  147    3.7     |  34     |  2.30     Ca    8.1        02 Apr 2024 06:25  Phos  3.4       02 Apr 2024 06:25  Mg     2.3       02 Apr 2024 06:25        Urinalysis Basic - ( 02 Apr 2024 06:25 )    Color: x / Appearance: x / SG: x / pH: x  Gluc: 147 mg/dL / Ketone: x  / Bili: x / Urobili: x   Blood: x / Protein: x / Nitrite: x   Leuk Esterase: x / RBC: x / WBC x   Sq Epi: x / Non Sq Epi: x / Bacteria: x      CAPILLARY BLOOD GLUCOSE      POCT Blood Glucose.: 142 mg/dL (02 Apr 2024 07:35)  POCT Blood Glucose.: 177 mg/dL (01 Apr 2024 21:08)  POCT Blood Glucose.: 196 mg/dL (01 Apr 2024 18:11)  POCT Blood Glucose.: 144 mg/dL (01 Apr 2024 12:27)          RADIOLOGY & ADDITIONAL TESTS:    < from: TTE W or WO Ultrasound Enhancing Agent (04.01.24 @ 10:59) >  CONCLUSIONS:      1. Technically difficult image quality.   2. Left ventricular systolic function is moderately to severely decreased with an ejection fraction of 30 % by Nunes's method of disks.   3. Mildto moderate left ventricular hypertrophy.   4. The right ventricle is not well visualized. Based on visual assessment, the right ventricle appears mildly enlarged. mildly reduced systolic function.   5. The left atrium is mildly dilated.   6. Trace aortic regurgitation.   7. Mild mitral regurgitation.   8. Moderate to severe aortic stenosis. There is a Vmax of 3.66m/s, Mean gradient of 26mmHg and a DI of 0.19. The JUAN DANIEL is calculated as 0.61cm2 by continuity. This likely represents low flow low gradient AS.   9. Estimated pulmonary artery systolic pressure is 59 mmHg, consistent with moderate pulmonary hypertension.  10. The inferior vena cava is dilated measuring 2.60 cm in diameter, (dilated >2.1cm) with normal inspiratory collapse (normal >50%) consistent with mildly elevated right atrial pressure (~8, range 5-10mmHg).    < end of copied text >      Consultant(s) Notes Reviewed:  [x] YES  [ ] NO

## 2024-04-02 NOTE — PROGRESS NOTE ADULT - ASSESSMENT
74 y/o F with Systolic HF (EF 38%), mod AS, HTN, T2DM, LBBB, CKD, and lymphedema presented to the ED c/o SOB, CURTIS, orthopnea, and edema for >2 weeks.  States, she has been compliant with her diuretic.  However, admits to be drinking fluids very judiciously (at least 3L/day). Admitted with acute on chr CHF exacerbation. Follows with Dr. Luong    Acute on chronic Systolic HF (EF 38%), mod AS, HTN, LBBB, Lymphedema   - Has known systolic HF and mod AS.    - On home Torsemide 20 mg daily, Eplerenone 25 mg daily, and Toprol  mg daily  - Compliant with all meds including Torsemide, though, dose was reduced to daily.  Additionally, she has been drinking fluids excessively  - Continue to hold home Torsemide and home Eplerenone for now  - BNP:  <--37769  - Volume status improved today.    - Continue Lasix to 80 mg q12H.  Creatinine stable at 2.3  - Avoid nephrotoxics.  Renal following  - Monitor for contraction alkalosis  - Continue home Toprol XL  - Fluid restriction of 1.5L/day  - Strict I/O's and daily weights.  Patient has known lymphedema  - Repeat TTE showed EF 30%, mild-mod LVH, mildly reduced RVF, mod-severe AS (.61 cmsq), mod pHTN  - She will be discharged on Torsemide 20 mg q12H    - Has a known LBBB  - Had LHC in 7/2022 showing non-obstructive CAD  - Troponin: <-22  - Continue ASA and statin    - BP stable and controlled   - Monitor and replete lytes, keep K>4, Mg>2.  - Will continue to follow.    Kira Zuniga DNP, NP-C, AGACNP-C  Cardiology   Call TEAMS   72 y/o F with Systolic HF (EF 38%), mod AS, HTN, T1DM, LBBB, CKD, and lymphedema presented to the ED c/o SOB, CURTIS, orthopnea, and edema for >2 weeks.  States, she has been compliant with her diuretic.  However, admits to be drinking fluids very judiciously (at least 3L/day). Admitted with acute on chr CHF exacerbation. Follows with Dr. Luong    Acute on chronic Systolic HF (EF 38%), mod AS, HTN, LBBB, Lymphedema   - Has known systolic HF and mod AS.    - On home Torsemide 20 mg daily, Eplerenone 25 mg daily, and Toprol  mg daily  - Compliant with all meds including Torsemide, though, dose was reduced to daily.  Additionally, she has been drinking fluids excessively  - Continue to hold home Torsemide and home Eplerenone for now  - BNP:  <--91599  - Volume status improved today.    - Continue Lasix to 80 mg q12H.  Creatinine stable at 2.3  - Avoid nephrotoxics.  Renal following  - Monitor for contraction alkalosis  - Continue home Toprol XL  - Fluid restriction of 1.5L/day  - Strict I/O's and daily weights.  Patient has known lymphedema  - Repeat TTE showed EF 30%, mild-mod LVH, mildly reduced RVF, mod-severe AS (.61 cmsq), mod pHTN  - She will be discharged on Torsemide 20 mg q12H    - Has a known LBBB  - Had LHC in 7/2022 showing non-obstructive CAD  - Troponin: <-22  - Continue ASA and statin    - BP stable and controlled   - Monitor and replete lytes, keep K>4, Mg>2.  - Will continue to follow.    Kira Zuniga DNP, NP-C, AGACNP-C  Cardiology   Call TEAMS

## 2024-04-02 NOTE — PROGRESS NOTE ADULT - ASSESSMENT
73F with a PMHx of Chronic Systolic CHF (last TTE with EF 38%), moderate aortic stenosis, known LBBB, HTN, DM1, CKD, hypothyroidism, and chronic lymphedema presents to the ED with shortness of breath over the past two weeks which has been acutely worsening.    CHF  pulm HTN  [facility] eval -   Obesity  AS  valv heart disease  CKD  DM  Atelectasis  Lymphedema    VBG -   o2 support - night time and day time prn   LELIA - [facility] eval as outpatient  weight management discussion  on LASIX high dose    chronic Systolic HF (EF 38%), mod AS, HTN, LBBB, Lymphedema   - Has known systolic HF, EF 38% (2023), mod AS.    diuresis in progress  [facility] rx regimen  cardio eval and follow up noted - follows with Dr Nathan in the office -   dietary discretion  monitor VS and Sat  goal sat > 88 pct  for now nocturnal o2 - will check VBG - assess for [facility] - outpatient PSG and sleep apnea testing  weight management discussion  mobilize

## 2024-04-02 NOTE — PROGRESS NOTE ADULT - NS ATTEST RISK PROBLEM GEN_ALL_CORE FT
Acute on chronic systolic heart failure with DUNCAN on CKD4
acute on chronic systolic HF, valvular dz, AHRF, DUNCAN on CKD4

## 2024-04-02 NOTE — PROGRESS NOTE ADULT - SUBJECTIVE AND OBJECTIVE BOX
Patient is a 73y old  Female who presents with a chief complaint of Acute on chronic systolic CHF (30 Mar 2024 09:10)       HPI:  73F with a PMHx of Chronic Systolic CHF (last TTE with EF 38%), moderate aortic stenosis, known LBBB, HTN, DM2, CKD, hypothyroidism, and chronic lymphedema presents to the ED with shortness of breath over the past two weeks which has been acutely worsening. Pt states she is compliant with her medications but has not be as compliant with monitoring her fluid intake. Currently she denies chest pain, dizziness, abdominal pain, nausea, vomiting, diarrhea, fever, or chills. (29 Mar 2024 21:19)  SOB improving.  Renal consulted for DUNCAN on CKD.  Sees Dr. MCCRARY in office.  Urinating well with diuretic.        Breathing improving. NO N/V    PAST MEDICAL & SURGICAL HISTORY:  Hypertension      Diabetes      Lymphedema      H/O left bundle branch block      History of left bundle branch block (LBBB)      Systolic heart failure, chronic      H/O cataract  2020      History of surgical removal of meniscus of knee  left in 1971      Frozen shoulder  2000      H/O Achilles tendon repair  lengthened bilaterally, 2000      Fractured skull  1968           FAMILY HISTORY:  Family history of CVA  mom, age 57    NC    Social History:Non smoker    MEDICATIONS  (STANDING):  aspirin  chewable 81 milliGRAM(s) Oral daily  atorvastatin 80 milliGRAM(s) Oral at bedtime  dextrose 5%. 1000 milliLiter(s) (100 mL/Hr) IV Continuous <Continuous>  dextrose 5%. 1000 milliLiter(s) (50 mL/Hr) IV Continuous <Continuous>  dextrose 50% Injectable 12.5 Gram(s) IV Push once  dextrose 50% Injectable 25 Gram(s) IV Push once  dextrose 50% Injectable 25 Gram(s) IV Push once  ferrous    sulfate 325 milliGRAM(s) Oral two times a day  furosemide   Injectable 60 milliGRAM(s) IV Push two times a day  glucagon  Injectable 1 milliGRAM(s) IntraMuscular once  heparin   Injectable 5000 Unit(s) SubCutaneous every 12 hours  insulin glargine Injectable (LANTUS) 20 Unit(s) SubCutaneous at bedtime  insulin lispro (ADMELOG) corrective regimen sliding scale   SubCutaneous three times a day before meals  levothyroxine 75 MICROGram(s) Oral daily  metoprolol succinate  milliGRAM(s) Oral daily  nystatin Powder 1 Application(s) Topical two times a day  pantoprazole    Tablet 40 milliGRAM(s) Oral before breakfast    MEDICATIONS  (PRN):  dextrose Oral Gel 15 Gram(s) Oral once PRN Blood Glucose LESS THAN 70 milliGRAM(s)/deciliter   Meds reviewed    Allergies    Ativan (Rash; Urticaria)  penicillins (Hives)    Intolerances         REVIEW OF SYSTEMS:    as above      Vital Signs Last 24 Hrs  T(C): 36.5 (02 Apr 2024 05:27), Max: 36.9 (01 Apr 2024 12:19)  T(F): 97.7 (02 Apr 2024 05:27), Max: 98.5 (01 Apr 2024 12:19)  HR: 54 (02 Apr 2024 05:27) (53 - 57)  BP: 142/65 (02 Apr 2024 05:27) (112/63 - 147/68)  BP(mean): --  RR: 18 (02 Apr 2024 05:27) (18 - 18)  SpO2: 95% (02 Apr 2024 05:27) (92% - 98%)    Parameters below as of 02 Apr 2024 05:27  Patient On (Oxygen Delivery Method): nasal cannula  O2 Flow (L/min): 1        PHYSICAL EXAM:    GENERAL: NAD  HEAD:  Atraumatic, Normocephalic  EYES: EOMI, conjunctiva and sclera clear  NERVOUS SYSTEM:  Awake and Alert  CHEST/LUNG: Decreased  EXTREMITIES:  ++Edema  SKIN: No rashes No obvious ecchymosis      LABS:                                       12.0   5.98  )-----------( 180      ( 02 Apr 2024 06:25 )             39.6     04-02    143  |  101  |  47<H>  ----------------------------<  147<H>  3.7   |  34<H>  |  2.30<H>    Ca    8.1<L>      02 Apr 2024 06:25  Phos  3.4     04-02  Mg     2.3     04-02        Urinalysis Basic - ( 02 Apr 2024 06:25 )    Color: x / Appearance: x / SG: x / pH: x  Gluc: 147 mg/dL / Ketone: x  / Bili: x / Urobili: x   Blood: x / Protein: x / Nitrite: x   Leuk Esterase: x / RBC: x / WBC x   Sq Epi: x / Non Sq Epi: x / Bacteria: x

## 2024-04-02 NOTE — CASE MANAGEMENT PROGRESS NOTE - NSCMPROGRESSNOTE_GEN_ALL_CORE
Discussed patient on rounds this morning and chart reviewed. Patient remains acute on IV Lasix 80 mg Q12H and 1LNC. CM remains available.

## 2024-04-02 NOTE — PROGRESS NOTE ADULT - PROBLEM SELECTOR PLAN 3
- DUNCAN on CKD on admission (2.7 on ADM, baseline 2.4) likely secondary to CRS  - Resolved, acutely requiring IV Diuresis - Lasix increased per cards  - Monitor daily chemistry  - Nephro Dr. Edwards following - DUNCAN on CKD4 on admission (2.7 on ADM, baseline 2.4) likely secondary to CRS - improved w/ diuresis  - Resolved, acutely requiring IV Diuresis - Lasix increased per cards  - Monitor daily chemistry  - Nephro Dr. Edwards following

## 2024-04-02 NOTE — PROGRESS NOTE ADULT - ASSESSMENT
DUNCAN on CKD 4  CHF  HTN  LE Edema    -Baseline Creatinine approx 2.4  -DUNCAN likely 2/2 to CRS  -Urine lytes will be less helpful in the setting of diuretic administration  -Will need to cont diuretic and monitor renal tolerance  -BP well controlled  -Creatinine stable at baseline range; stable lytes

## 2024-04-03 NOTE — PROGRESS NOTE ADULT - ASSESSMENT
74 y/o F with Systolic HF (EF 38%), mod AS, HTN, T1DM, LBBB, CKD, and lymphedema presented to the ED c/o SOB, CURTIS, orthopnea, and edema for >2 weeks.  States, she has been compliant with her diuretic.  However, admits to be drinking fluids very judiciously (at least 3L/day). Admitted with acute on chr CHF exacerbation. Follows with Dr. Luong    Acute on chronic Systolic HF (EF 38%), mod AS, HTN, LBBB, Lymphedema   - Has known systolic HF and mod AS.    - Repeat TTE showed EF 30%, mild-mod LVH, mildly reduced RVF, mod-severe AS (.61 cmsq), mod pHTN  - On home Torsemide 20 mg daily, Eplerenone 25 mg daily, and Toprol  mg daily  - Compliant with all meds including Torsemide, though, dose was reduced to daily.  Additionally, she has been drinking fluids excessively  - Continue to hold home Torsemide and home Eplerenone for now  - BNP:  <--10566  - Volume status improving   - Continue Lasix to 80 mg q12H.    - Creatinine:  <--2.20  - Bicarb:  <--34,  <--34,  <--36  - Avoid nephrotoxics.  Renal following  - Monitor for contraction alkalosis  - Continue home Toprol XL  - Fluid restriction of 1.5L/day  - Strict I/O's and daily weights.  Patient has known lymphedema  - She will be discharged on Torsemide 20 mg q12H    - Has a known LBBB  - Had LHC in 7/2022 showing non-obstructive CAD  - Troponin: <-22  - Continue ASA and statin    - BP stable and controlled   - Monitor and replete lytes, keep K>4, Mg>2.  - Will continue to follow.    Jose E Felipe, MS FNP, AGACNP  Nurse Practitioner- Cardiology   Please call on TEAMS       72 y/o F with Systolic HF (EF 38%), mod AS, HTN, T1DM, LBBB, CKD, and lymphedema presented to the ED c/o SOB, CURTIS, orthopnea, and edema for >2 weeks.  States, she has been compliant with her diuretic.  However, admits to be drinking fluids very judiciously (at least 3L/day). Admitted with acute on chr CHF exacerbation. Follows with Dr. Luong    Acute on chronic Systolic HF (EF 38%), mod AS, HTN, LBBB, Lymphedema   - Has known systolic HF and mod AS.    - Repeat TTE showed EF 30%, mild-mod LVH, mildly reduced RVF, mod-severe AS (.61 cmsq), mod pHTN  - On home Torsemide 20 mg daily, Eplerenone 25 mg daily, and Toprol  mg daily  - Compliant with all meds including Torsemide, though, dose was reduced to daily.  Additionally, she has been drinking fluids excessively  - Continue to hold home Torsemide and home Eplerenone for now  - BNP:  <--16523  - Volume status improving   - Continue Lasix to 80 mg q12H.    - Creatinine:  2.20  - Bicarb:  <--34,  <--34,  <--36  - Avoid nephrotoxics.  Renal following  - Monitor for contraction alkalosis  - Continue home Toprol XL  - Fluid restriction of 1.5L/day  - Strict I/O's and daily weights.  Patient has known lymphedema  - She will be discharged on Torsemide 20 mg q12H    - Has a known LBBB  - Had C in 7/2022 showing non-obstructive CAD  - Troponin: <-22  - Continue ASA and statin    - BP stable and controlled   - Monitor and replete lytes, keep K>4, Mg>2.  - Will continue to follow.    Jose E Felipe, MS FNP, AGACNP  Nurse Practitioner- Cardiology   Please call on TEAMS

## 2024-04-03 NOTE — CASE MANAGEMENT PROGRESS NOTE - NSCMPROGRESSNOTE_GEN_ALL_CORE
Discussed patient on rounds this morning and chart reviewed. Patient remains acute on IV Lasix 80 mg Q12H. Patient is on 2LNC- desat to 86%RA at rest. CM met with the patient at the bedside and discussed home care services for a visiting nurse/PT. Home care choice list provided on initial assessment. Patient declined and stated she does not want the service. CM discussed the process for home oxygen if required upon discharge. CM reinforced CMS STAR program and patient has yellow card-patient is receptive. CM remains available.

## 2024-04-03 NOTE — PROGRESS NOTE ADULT - ASSESSMENT
73F with a PMHx of Chronic Systolic CHF (last TTE with EF 38%), moderate aortic stenosis, known LBBB, HTN, DM1, CKD4, hypothyroidism, and chronic lymphedema admitted with acute on chronic systolic CHF and DUNCAN on CKD4 - Cardiorenal syndrome.

## 2024-04-03 NOTE — PROGRESS NOTE ADULT - PROBLEM SELECTOR PLAN 1
- Acute on chronic systolic CHF exacerbation dt poor fluid control at home  - Continues to require IV Lasix 80 mg BID  - home torsemide held but will change to BID torsemide when time to change to PO  - holding home epleronine  - C/w home Toprol XL for GDMT  - Fluid restrict 1500 mL  - TTE reviewed: EF 30%, LVH, mod-severe AS, moderate pulm HTN; decrease from EF 38% 06/2023  - LBBB on EKG is chronic and known, as per jarad pt had a left heart cath in 2022 which was benign, will c/w home ASA and statin on this admit.   - Cardio Dr. Lawrence group following - DC planning once on PO diuretics

## 2024-04-03 NOTE — PROGRESS NOTE ADULT - PROBLEM SELECTOR PLAN 3
- DUNCAN on CKD4 on admission (2.7 on ADM, baseline 2.4) likely secondary to CRS - improved w/ diuresis  - Resolved, acutely requiring IV Diuresis - Lasix increased per cards  - Renal function tolerating lasix well  - Monitor daily chemistry  - Nephro Dr. Edwards following

## 2024-04-03 NOTE — PROGRESS NOTE ADULT - SUBJECTIVE AND OBJECTIVE BOX
Patient is a 73y old  Female who presents with a chief complaint of Acute on chronic systolic CHF (30 Mar 2024 09:10)       HPI:  73F with a PMHx of Chronic Systolic CHF (last TTE with EF 38%), moderate aortic stenosis, known LBBB, HTN, DM2, CKD, hypothyroidism, and chronic lymphedema presents to the ED with shortness of breath over the past two weeks which has been acutely worsening. Pt states she is compliant with her medications but has not be as compliant with monitoring her fluid intake. Currently she denies chest pain, dizziness, abdominal pain, nausea, vomiting, diarrhea, fever, or chills. (29 Mar 2024 21:19)  SOB improving.  Renal consulted for DUNCAN on CKD.  Sees Dr. MCCRARY in office.  Urinating well with diuretic.        Breathing improving. NO N/V    PAST MEDICAL & SURGICAL HISTORY:  Hypertension      Diabetes      Lymphedema      H/O left bundle branch block      History of left bundle branch block (LBBB)      Systolic heart failure, chronic      H/O cataract  2020      History of surgical removal of meniscus of knee  left in 1971      Frozen shoulder  2000      H/O Achilles tendon repair  lengthened bilaterally, 2000      Fractured skull  1968           FAMILY HISTORY:  Family history of CVA  mom, age 57    NC    Social History:Non smoker    MEDICATIONS  (STANDING):  aspirin  chewable 81 milliGRAM(s) Oral daily  atorvastatin 80 milliGRAM(s) Oral at bedtime  dextrose 5%. 1000 milliLiter(s) (100 mL/Hr) IV Continuous <Continuous>  dextrose 5%. 1000 milliLiter(s) (50 mL/Hr) IV Continuous <Continuous>  dextrose 50% Injectable 12.5 Gram(s) IV Push once  dextrose 50% Injectable 25 Gram(s) IV Push once  dextrose 50% Injectable 25 Gram(s) IV Push once  ferrous    sulfate 325 milliGRAM(s) Oral two times a day  furosemide   Injectable 80 milliGRAM(s) IV Push every 12 hours  glucagon  Injectable 1 milliGRAM(s) IntraMuscular once  heparin   Injectable 5000 Unit(s) SubCutaneous every 12 hours  insulin glargine Injectable (LANTUS) 15 Unit(s) SubCutaneous at bedtime  insulin lispro (ADMELOG) corrective regimen sliding scale   SubCutaneous three times a day before meals  levothyroxine 75 MICROGram(s) Oral daily  metoprolol succinate  milliGRAM(s) Oral daily  nystatin Powder 1 Application(s) Topical two times a day  pantoprazole    Tablet 40 milliGRAM(s) Oral before breakfast    MEDICATIONS  (PRN):  dextrose Oral Gel 15 Gram(s) Oral once PRN Blood Glucose LESS THAN 70 milliGRAM(s)/deciliter      Allergies    Ativan (Rash; Urticaria)  penicillins (Hives)    Intolerances         REVIEW OF SYSTEMS:    as above      Vital Signs Last 24 Hrs  T(C): 36.6 (03 Apr 2024 04:53), Max: 36.7 (02 Apr 2024 12:20)  T(F): 97.9 (03 Apr 2024 04:53), Max: 98 (02 Apr 2024 12:20)  HR: 56 (03 Apr 2024 04:53) (56 - 64)  BP: 129/69 (03 Apr 2024 04:53) (115/65 - 131/71)  BP(mean): --  RR: 18 (03 Apr 2024 04:53) (18 - 18)  SpO2: 92% (03 Apr 2024 04:53) (92% - 93%)    Parameters below as of 03 Apr 2024 04:53  Patient On (Oxygen Delivery Method): nasal cannula  O2 Flow (L/min): 1        PHYSICAL EXAM:    GENERAL: NAD  HEAD:  Atraumatic, Normocephalic  EYES: EOMI, conjunctiva and sclera clear  NERVOUS SYSTEM:  Awake and Alert  CHEST/LUNG: Decreased  EXTREMITIES:  ++Edema  SKIN: No rashes No obvious ecchymosis      LABS:                                                           11.2   5.76  )-----------( 162      ( 03 Apr 2024 06:30 )             36.9     04-03    142  |  101  |  46<H>  ----------------------------<  240<H>  3.8   |  34<H>  |  2.20<H>    Ca    8.1<L>      03 Apr 2024 06:30  Phos  3.3     04-03  Mg     2.3     04-03        Urinalysis Basic - ( 03 Apr 2024 06:30 )    Color: x / Appearance: x / SG: x / pH: x  Gluc: 240 mg/dL / Ketone: x  / Bili: x / Urobili: x   Blood: x / Protein: x / Nitrite: x   Leuk Esterase: x / RBC: x / WBC x   Sq Epi: x / Non Sq Epi: x / Bacteria: x

## 2024-04-03 NOTE — PROGRESS NOTE ADULT - SUBJECTIVE AND OBJECTIVE BOX
Date/Time Patient Seen:  		  Referring MD:   Data Reviewed	       Patient is a 73y old  Female who presents with a chief complaint of Acute on chronic systolic CHF (02 Apr 2024 10:52)      Subjective/HPI     PAST MEDICAL & SURGICAL HISTORY:  Hypertension    Diabetes    Lymphedema    H/O left bundle branch block    History of left bundle branch block (LBBB)    Systolic heart failure, chronic    Type 1 diabetes    H/O cataract  2020    History of surgical removal of meniscus of knee  left in 1971    Frozen shoulder  2000    H/O Achilles tendon repair  lengthened bilaterally, 2000    Fractured skull  1968          Medication list         MEDICATIONS  (STANDING):  aspirin  chewable 81 milliGRAM(s) Oral daily  atorvastatin 80 milliGRAM(s) Oral at bedtime  dextrose 5%. 1000 milliLiter(s) (50 mL/Hr) IV Continuous <Continuous>  dextrose 5%. 1000 milliLiter(s) (100 mL/Hr) IV Continuous <Continuous>  dextrose 50% Injectable 12.5 Gram(s) IV Push once  dextrose 50% Injectable 25 Gram(s) IV Push once  dextrose 50% Injectable 25 Gram(s) IV Push once  ferrous    sulfate 325 milliGRAM(s) Oral two times a day  furosemide   Injectable 80 milliGRAM(s) IV Push every 12 hours  glucagon  Injectable 1 milliGRAM(s) IntraMuscular once  heparin   Injectable 5000 Unit(s) SubCutaneous every 12 hours  insulin glargine Injectable (LANTUS) 15 Unit(s) SubCutaneous at bedtime  insulin lispro (ADMELOG) corrective regimen sliding scale   SubCutaneous three times a day before meals  levothyroxine 75 MICROGram(s) Oral daily  metoprolol succinate  milliGRAM(s) Oral daily  nystatin Powder 1 Application(s) Topical two times a day  pantoprazole    Tablet 40 milliGRAM(s) Oral before breakfast    MEDICATIONS  (PRN):  dextrose Oral Gel 15 Gram(s) Oral once PRN Blood Glucose LESS THAN 70 milliGRAM(s)/deciliter         Vitals log        ICU Vital Signs Last 24 Hrs  T(C): 36.6 (03 Apr 2024 04:53), Max: 36.7 (02 Apr 2024 12:20)  T(F): 97.9 (03 Apr 2024 04:53), Max: 98 (02 Apr 2024 12:20)  HR: 56 (03 Apr 2024 04:53) (56 - 64)  BP: 129/69 (03 Apr 2024 04:53) (115/65 - 131/71)  BP(mean): --  ABP: --  ABP(mean): --  RR: 18 (03 Apr 2024 04:53) (18 - 18)  SpO2: 92% (03 Apr 2024 04:53) (92% - 93%)    O2 Parameters below as of 03 Apr 2024 04:53  Patient On (Oxygen Delivery Method): nasal cannula  O2 Flow (L/min): 1               Input and Output:  I&O's Detail    01 Apr 2024 07:01  -  02 Apr 2024 07:00  --------------------------------------------------------  IN:  Total IN: 0 mL    OUT:    Voided (mL): 700 mL  Total OUT: 700 mL    Total NET: -700 mL      02 Apr 2024 07:01  -  03 Apr 2024 05:28  --------------------------------------------------------  IN:  Total IN: 0 mL    OUT:    Voided (mL): 800 mL  Total OUT: 800 mL    Total NET: -800 mL          Lab Data                        12.0   5.98  )-----------( 180      ( 02 Apr 2024 06:25 )             39.6     04-02    143  |  101  |  47<H>  ----------------------------<  147<H>  3.7   |  34<H>  |  2.30<H>    Ca    8.1<L>      02 Apr 2024 06:25  Phos  3.4     04-02  Mg     2.3     04-02              Review of Systems	      Objective     Physical Examination    heart s1s2  lung dc BS  head nc      Pertinent Lab findings & Imaging      Frederic:  NO   Adequate UO     I&O's Detail    01 Apr 2024 07:01  -  02 Apr 2024 07:00  --------------------------------------------------------  IN:  Total IN: 0 mL    OUT:    Voided (mL): 700 mL  Total OUT: 700 mL    Total NET: -700 mL      02 Apr 2024 07:01  -  03 Apr 2024 05:28  --------------------------------------------------------  IN:  Total IN: 0 mL    OUT:    Voided (mL): 800 mL  Total OUT: 800 mL    Total NET: -800 mL               Discussed with:     Cultures:	        Radiology

## 2024-04-03 NOTE — PROGRESS NOTE ADULT - PROBLEM SELECTOR PLAN 2
- Suspect LELIA/OHS  - Patient on RA this AM, using NC overnight, PRN through the day  - Pulm Dr. Duarte consulted, recommending LELIA/OHS eval outpatient  - O2 qhs while inpatient  - Home O2 Eval with 86% at rest O2sat on RA

## 2024-04-03 NOTE — PROGRESS NOTE ADULT - ASSESSMENT
73F with a PMHx of Chronic Systolic CHF (last TTE with EF 38%), moderate aortic stenosis, known LBBB, HTN, DM1, CKD, hypothyroidism, and chronic lymphedema presents to the ED with shortness of breath over the past two weeks which has been acutely worsening.    CHF  pulm HTN  LELIA OHS eval -   Obesity  AS  valv heart disease  CKD  DM  Atelectasis  Lymphedema    VBG - pending - serum co2 noted  o2 support - night time and day time prn   LELIA - OHS eval as outpatient  weight management discussion  on LASIX high dose    chronic Systolic HF (EF 38%), mod AS, HTN, LBBB, Lymphedema   - Has known systolic HF, EF 38% (2023), mod AS.    diuresis in progress  [facility]vs rx regimen  cardio eval and follow up noted - follows with Dr Nathan in the office -   dietary discretion  monitor VS and Sat  goal sat > 88 pct  for now nocturnal o2 - will check VBG - assess for LELIA OHS - outpatient PSG and sleep apnea testing  weight management discussion  mobilize

## 2024-04-03 NOTE — PROGRESS NOTE ADULT - SUBJECTIVE AND OBJECTIVE BOX
Patient called wanted to void, RN gave Raffi Martinez RN  02/11/22 [id number] -- Howard Kimble, Carlos Luong Savella, Goodger, Cohen: Office # [phone]    Follow Up:  ADHF, Edema    Subjective/Observations: Patient seen and examined. Patient awake, alert, resting in bed. No complaints of chest pain, dyspnea, palpitations or dizziness. No signs of orthopnea or PND. Tolerating O2 via nasal cannula.     REVIEW OF SYSTEMS: All other review of systems are negative unless indicated above    PAST MEDICAL & SURGICAL HISTORY:  Hypertension      Hypertension      Diabetes      Lymphedema      H/O left bundle branch block      History of left bundle branch block (LBBB)      Systolic heart failure, chronic      Type 1 diabetes      H/O cataract  2020      History of surgical removal of meniscus of knee  left in 1971      Frozen shoulder  2000      H/O Achilles tendon repair  lengthened bilaterally, 2000      Fractured skull  1968          MEDICATIONS  (STANDING):  aspirin  chewable 81 milliGRAM(s) Oral daily  atorvastatin 80 milliGRAM(s) Oral at bedtime  dextrose 5%. 1000 milliLiter(s) (100 mL/Hr) IV Continuous <Continuous>  dextrose 5%. 1000 milliLiter(s) (50 mL/Hr) IV Continuous <Continuous>  dextrose 50% Injectable 12.5 Gram(s) IV Push once  dextrose 50% Injectable 25 Gram(s) IV Push once  dextrose 50% Injectable 25 Gram(s) IV Push once  ferrous    sulfate 325 milliGRAM(s) Oral two times a day  furosemide   Injectable 80 milliGRAM(s) IV Push every 12 hours  glucagon  Injectable 1 milliGRAM(s) IntraMuscular once  heparin   Injectable 5000 Unit(s) SubCutaneous every 12 hours  insulin glargine Injectable (LANTUS) 15 Unit(s) SubCutaneous at bedtime  insulin lispro (ADMELOG) corrective regimen sliding scale   SubCutaneous three times a day before meals  levothyroxine 75 MICROGram(s) Oral daily  metoprolol succinate  milliGRAM(s) Oral daily  nystatin Powder 1 Application(s) Topical two times a day  pantoprazole    Tablet 40 milliGRAM(s) Oral before breakfast    MEDICATIONS  (PRN):  dextrose Oral Gel 15 Gram(s) Oral once PRN Blood Glucose LESS THAN 70 milliGRAM(s)/deciliter    Allergies    Ativan (Rash; Urticaria)  penicillins (Hives)    Intolerances      Vital Signs Last 24 Hrs  T(C): 36.6 (03 Apr 2024 04:53), Max: 36.7 (02 Apr 2024 12:20)  T(F): 97.9 (03 Apr 2024 04:53), Max: 98 (02 Apr 2024 12:20)  HR: 56 (03 Apr 2024 04:53) (56 - 64)  BP: 116/68 (03 Apr 2024 08:02) (115/65 - 131/71)  BP(mean): --  RR: 18 (03 Apr 2024 08:02) (18 - 18)  SpO2: 86% (03 Apr 2024 08:02) (86% - 93%)    Parameters below as of 03 Apr 2024 08:02  Patient On (Oxygen Delivery Method): room air      I&O's Summary    02 Apr 2024 07:01  -  03 Apr 2024 07:00  --------------------------------------------------------  IN: 0 mL / OUT: 1500 mL / NET: -1500 mL    03 Apr 2024 07:01  -  03 Apr 2024 10:26  --------------------------------------------------------  IN: 240 mL / OUT: 0 mL / NET: 240 mL          TELE: Not on telemetry   PHYSICAL EXAM:  Constitutional: NAD, awake and alert, Obese   HEENT: Moist Mucous Membranes, Anicteric  Pulmonary: Non-labored, breath sounds are clear bilaterally, No wheezing, rales or rhonchi  Cardiovascular: Regular, S1 and S2, + murmurs, No rubs, gallops or clicks  Gastrointestinal:  soft, nontender, nondistended   Lymph: 2-3+ peripheral edema. No lymphadenopathy.   Skin: No visible rashes or ulcers.  Psych:  Mood & affect appropriate      LABS: All Labs Reviewed:                        11.2   5.76  )-----------( 162      ( 03 Apr 2024 06:30 )             36.9                         12.0   5.98  )-----------( 180      ( 02 Apr 2024 06:25 )             39.6                         11.4   5.74  )-----------( 180      ( 01 Apr 2024 07:10 )             37.3     03 Apr 2024 06:30    142    |  101    |  46     ----------------------------<  240    3.8     |  34     |  2.20   02 Apr 2024 06:25    143    |  101    |  47     ----------------------------<  147    3.7     |  34     |  2.30   01 Apr 2024 07:10    145    |  104    |  51     ----------------------------<  87     4.0     |  36     |  2.40     Ca    8.1        03 Apr 2024 06:30  Ca    8.1        02 Apr 2024 06:25  Ca    8.9        01 Apr 2024 07:10  Phos  3.3       03 Apr 2024 06:30  Phos  3.4       02 Apr 2024 06:25  Phos  4.2       01 Apr 2024 07:10  Mg     2.3       03 Apr 2024 06:30  Mg     2.3       02 Apr 2024 06:25  Mg     2.3       01 Apr 2024 07:10       Troponin I, High Sensitivity Result: 22.2 ng/L (03-29-24 @ 18:40)    12 Lead ECG:   Ventricular Rate 57 BPM    Atrial Rate 57 BPM    P-R Interval 312 ms    QRS Duration 168 ms    Q-T Interval 520 ms    QTC Calculation(Bazett) 506 ms    P Axis 38 degrees    R Axis 212 degrees    T Axis -31 degrees    Diagnosis Line Sinus bradycardia with 1st degree AV block  Right superior axis deviation  Nonspecific intraventricular block  Abnormal ECG  When compared with ECG of 09-JAN-2024 09:46,  Nonspecific T wave abnormality now evident in Inferior leads  Nonspecific T wave abnormality, improved in Lateral leads  Confirmed by Dale Lawrence MD ([id number]) on 3/31/2024 2:43:25 PM (03-29-24 @ 17:08)      TRANSTHORACIC ECHOCARDIOGRAM REPORT  ________________________________________________________________________________                                      _______       Pt. Name:       EVELYN LOPEZ Study Date:    4/1/2024  MRN:            [id number]         YOB: 1950  Accession #:    [id number]        Age:           73 years  Account#:       [id number]       Gender:        F  Heart Rate:                      Height:        71.00 in (180.34 cm)  Rhythm:                          Weight:        330.69 lb (150.00 kg)  Blood Pressure: 116/68 mmHg      BSA/BMI:       2.61 m² / 46.12 kg/m²  ________________________________________________________________________________________  Referring Physician:    [id number] Luis Enrique Luo  Interpreting Physician: Pia Sutton MD  Primary Sonographer:    Marlon Chen    CPT:               ECHO TTE WO CON COMP W DOPP - 99171.m  Indication(s):     Dyspnea, unspecified - R06.00  Procedure:         Transthoracic echocardiogram with 2-D, M-mode and complete                     spectral and color flow Doppler.  Ordering Location: [facility]  Admission Status:  Inpatient  Study Information: Image quality for this study is technically difficult.    _______________________________________________________________________________________     CONCLUSIONS:      1. Technically difficult image quality.   2. Left ventricular systolic function is moderately to severely decreased with an ejection fraction of 30 % by Nunes's method of disks.   3. Mildto moderate left ventricular hypertrophy.   4. The right ventricle is not well visualized. Based on visual assessment, the right ventricle appears mildly enlarged. mildly reduced systolic function.   5. The left atrium is mildly dilated.   6. Trace aortic regurgitation.   7. Mild mitral regurgitation.   8. Moderate to severe aortic stenosis. There is a Vmax of 3.66m/s, Mean gradient of 26mmHg and a DI of 0.19. The JUAN DANIEL is calculated as 0.61cm2 by continuity. This likely represents low flow low gradient AS.   9. Estimated pulmonary artery systolic pressure is 59 mmHg, consistent with moderate pulmonary hypertension.  10. The inferior vena cava is dilated measuring 2.60 cm in diameter, (dilated >2.1cm) with normal inspiratory collapse (normal >50%) consistent with mildly elevated right atrial pressure (~8, range 5-10mmHg).    ________________________________________________________________________________________  FINDINGS:     Left Ventricle:  Left ventricular systolic function is moderately to severely decreased with a calculated ejection fraction of 30 % by the Nunes's biplane method of disks. Mild to moderate left ventricular hypertrophy.     Right Ventricle:  The right ventricle is not well visualized. Based on visual assessment, the right ventricle appears mildly enlarged. The right ventricular cavity is mildly enlarged in size and mildly reduced systolic function. Tricuspid annular plane systolic excursion (TAPSE) is 2.4 cm (normal >=1.7 cm).     Left Atrium:  The left atrium is mildly dilated with an indexed volume of 34.20 ml/m².     Right Atrium:  The right atrium is normal in size.     Aortic Valve:  There is calcification of the aortic valve leaflets. There is moderate to severe aortic stenosis. The peak transaorticvelocity is 3.66 m/s, peak transaortic gradient is 53.6 mmHg and mean transaortic gradient is 26.0 mmHg with an LVOT/aortic valve VTI ratio of 0.19. The aortic valve area is estimated at 0.61 cm² by the continuity equation and 1.16 cm² by 2D planimetry. There is trace aortic regurgitation.     Mitral Valve:  There is mild calcification of the mitral valve annulus. Mitral valve leaflets are diffusely calcified. There is mild mitral regurgitation.     Tricuspid Valve:  There is mild tricuspid regurgitation. Estimated pulmonary artery systolic pressure is 59 mmHg, consistent with moderate pulmonary hypertension.     Pulmonic Valve:  The pulmonic valve was not well visualized. There is trace pulmonic regurgitation.     Aorta:  The aortic root at the sinuses of Valsalva is normal in size, measuring 2.90 cm (indexed 1.11 cm/m²).     Systemic Veins:  The inferior vena cava is dilated measuring 2.60 cm in diameter, (dilated >2.1cm) with normal inspiratory collapse (normal >50%) consistent with mildly elevated right atrial pressure (~8, range 5-10mmHg).  ____________________________________________________________________  QUANTITATIVE DATA:  Left Ventricle Measurements: (Indexed to BSA)     IVSd (2D):   1.3 cm  LVPWd (2D):  1.2 cm  LVIDd (2D):  5.4 cm  LVIDs (2D):  4.7 cm  LV Mass:     284 g  108.7 g/m²  LV Vol d, MOD A2C: 138.0 ml 52.85 ml/m²  LV Vol d, MOD A4C: 114.0 ml 43.65 ml/m²  LV Vol d, MOD BP:  128.3 ml 49.14 ml/m²  LV Vol s, MOD A2C: 86.9 ml  33.28 ml/m²  LV Vol s, MOD A4C: 88.5ml  33.89 ml/m²  LV Vol s, MOD BP:  89.5 ml  34.28 ml/m²  LVOT SV MOD BP:    38.8 ml  LV EF% MOD BP:     30 %     MV E Vmax:    0.75 m/s  MV A Vmax:    0.97 m/s  MV E/A:       0.77  e' lateral:   8.16 cm/s  e' medial:    3.59 cm/s  E/e' lateral: 9.18  E/e' medial:  20.86  E/e' Average: 12.75  MV DT:        268 msec    Aorta Measurements: (Normal range) (Indexed to BSA)     Sinuses of Valsalva: 2.90 cm (2.7 - 3.3 cm)       Left Atrium Measurements: (Indexed to BSA)  LA Diam 2D: 4.90 cm    Right Ventricle Measurements:     TAPSE:            2.4 cm  RV Base (RVID1):  4.9 cm  RV Mid (RVID2):   4.1 cm  RV Major (RVID3): 8.3 cm       LVOT / RVOT/ Qp/Qs Data: (Indexed to BSA)  LVOT Diameter: 2.00 cm  LVOT Vmax:     0.67 m/s  LVOT VTI:      19.00 cm  LVOT SV:       59.7 ml  22.86 ml/m²    Aortic Valve Measurements:  AV Vmax:                3.7 m/s  AV Peak Gradient:       53.6 mmHg  AV Mean Gradient:       26.0 mmHg  AV VTI:                 97.8 cm  AV VTI Ratio:           0.19  AoV EOA, Contin:      0.61 cm²  AoV EOA, Contin i:      0.23 cm²/m²  AoV area, 2D planim:    1.16 cm²  AoV Dimensionless Index 0.19    Mitral Valve Measurements:     MV E Vmax: 0.7 m/s  MV A Vmax: 1.0 m/s  MV E/A:    0.8       Tricuspid Valve Measurements:     TR Vmax:          3.6 m/s  TR Peak Gradient: 51.3 mmHg  RA Pressure:      8 mmHg  PASP:             59 mmHg    ________________________________________________________________________________________  Electronically signed on 4/1/2024 at 2:34:33 PM by Pia Sutton MD         *** Final ***

## 2024-04-03 NOTE — PROGRESS NOTE ADULT - SUBJECTIVE AND OBJECTIVE BOX
Patient is a 73y old  Female who presents with a chief complaint of Acute on chronic systolic CHF (03 Apr 2024 10:26)      INTERVAL HPI/OVERNIGHT EVENTS: Patient used NC per orders overnight, however was placed again on 2L NC for asymptomatic desat to 80s. Pt was seen and examined at bedside. Pt states that she feels well, denies HA/LH, cp, cough, sob, n/v/d/c. States she feels her leg swelling as decreased.    MEDICATIONS  (STANDING):  aspirin  chewable 81 milliGRAM(s) Oral daily  atorvastatin 80 milliGRAM(s) Oral at bedtime  dextrose 5%. 1000 milliLiter(s) (50 mL/Hr) IV Continuous <Continuous>  dextrose 5%. 1000 milliLiter(s) (100 mL/Hr) IV Continuous <Continuous>  dextrose 50% Injectable 12.5 Gram(s) IV Push once  dextrose 50% Injectable 25 Gram(s) IV Push once  dextrose 50% Injectable 25 Gram(s) IV Push once  ferrous    sulfate 325 milliGRAM(s) Oral two times a day  furosemide   Injectable 80 milliGRAM(s) IV Push every 12 hours  glucagon  Injectable 1 milliGRAM(s) IntraMuscular once  heparin   Injectable 5000 Unit(s) SubCutaneous every 12 hours  insulin glargine Injectable (LANTUS) 15 Unit(s) SubCutaneous at bedtime  insulin lispro (ADMELOG) corrective regimen sliding scale   SubCutaneous three times a day before meals  levothyroxine 75 MICROGram(s) Oral daily  metoprolol succinate  milliGRAM(s) Oral daily  nystatin Powder 1 Application(s) Topical two times a day  pantoprazole    Tablet 40 milliGRAM(s) Oral before breakfast    MEDICATIONS  (PRN):  dextrose Oral Gel 15 Gram(s) Oral once PRN Blood Glucose LESS THAN 70 milliGRAM(s)/deciliter      Allergies    Ativan (Rash; Urticaria)  penicillins (Hives)    Intolerances        REVIEW OF SYSTEMS:  CONSTITUTIONAL: No fever or chills  HEENT:  No headache, no sore throat  RESPIRATORY: No cough, wheezing, or shortness of breath  CARDIOVASCULAR: No chest pain, palpitations  GASTROINTESTINAL: No abd pain, nausea, vomiting, or diarrhea  GENITOURINARY: No dysuria    Vital Signs Last 24 Hrs  T(C): 36.6 (03 Apr 2024 04:53), Max: 36.7 (02 Apr 2024 12:20)  T(F): 97.9 (03 Apr 2024 04:53), Max: 98 (02 Apr 2024 12:20)  HR: 56 (03 Apr 2024 04:53) (56 - 64)  BP: 116/68 (03 Apr 2024 08:02) (115/65 - 131/71)  BP(mean): --  RR: 18 (03 Apr 2024 08:02) (18 - 18)  SpO2: 86% (03 Apr 2024 08:02) (86% - 93%)    Parameters below as of 03 Apr 2024 08:02  Patient On (Oxygen Delivery Method): room air        PHYSICAL EXAM:  GENERAL: NAD, well-appearing, on NC  HEENT:  anicteric, moist mucous membranes  CHEST/LUNG:  Difficult auscultation d/t body habitus - decreased breath sounds BL, no wheezing, rales, rhonchi appreciated  HEART:  RRR, loud whooshing pansystolic murmur heard in all areas, grade 5/6, unable to appreciate S1/S2  ABDOMEN:  obese, BS+, soft, nontender, nondistended  EXTREMITIES: Severe, 4+ pitting edema in B/L LE with multiple excoriations, venous stasis dermatitis in BLLE  NERVOUS SYSTEM: AAO x3. Answers questions and follows commands appropriately  PSYCH: normal affect    LABS:                        11.2   5.76  )-----------( 162      ( 03 Apr 2024 06:30 )             36.9     CBC Full  -  ( 03 Apr 2024 06:30 )  WBC Count : 5.76 K/uL  Hemoglobin : 11.2 g/dL  Hematocrit : 36.9 %  Platelet Count - Automated : 162 K/uL  Mean Cell Volume : 90.9 fl  Mean Cell Hemoglobin : 27.6 pg  Mean Cell Hemoglobin Concentration : 30.4 gm/dL  Auto Neutrophil # : x  Auto Lymphocyte # : x  Auto Monocyte # : x  Auto Eosinophil # : x  Auto Basophil # : x  Auto Neutrophil % : x  Auto Lymphocyte % : x  Auto Monocyte % : x  Auto Eosinophil % : x  Auto Basophil % : x    03 Apr 2024 06:30    142    |  101    |  46     ----------------------------<  240    3.8     |  34     |  2.20     Ca    8.1        03 Apr 2024 06:30  Phos  3.3       03 Apr 2024 06:30  Mg     2.3       03 Apr 2024 06:30        Urinalysis Basic - ( 03 Apr 2024 06:30 )    Color: x / Appearance: x / SG: x / pH: x  Gluc: 240 mg/dL / Ketone: x  / Bili: x / Urobili: x   Blood: x / Protein: x / Nitrite: x   Leuk Esterase: x / RBC: x / WBC x   Sq Epi: x / Non Sq Epi: x / Bacteria: x      CAPILLARY BLOOD GLUCOSE      POCT Blood Glucose.: 210 mg/dL (03 Apr 2024 08:04)  POCT Blood Glucose.: 215 mg/dL (02 Apr 2024 21:02)  POCT Blood Glucose.: 185 mg/dL (02 Apr 2024 17:05)  POCT Blood Glucose.: 198 mg/dL (02 Apr 2024 11:24)        Consultant(s) Notes Reviewed:  [x] YES  [ ] NO

## 2024-04-04 NOTE — PROGRESS NOTE ADULT - SUBJECTIVE AND OBJECTIVE BOX
Patient is a 73y old  Female who presents with a chief complaint of Acute on chronic systolic CHF (30 Mar 2024 09:10)       HPI:  73F with a PMHx of Chronic Systolic CHF (last TTE with EF 38%), moderate aortic stenosis, known LBBB, HTN, DM2, CKD, hypothyroidism, and chronic lymphedema presents to the ED with shortness of breath over the past two weeks which has been acutely worsening. Pt states she is compliant with her medications but has not be as compliant with monitoring her fluid intake. Currently she denies chest pain, dizziness, abdominal pain, nausea, vomiting, diarrhea, fever, or chills. (29 Mar 2024 21:19)  SOB improving.  Renal consulted for DUNCAN on CKD.  Sees Dr. MCCRARY in office.  Urinating well with diuretic.        Breathing improving. NO N/V    PAST MEDICAL & SURGICAL HISTORY:  Hypertension      Diabetes      Lymphedema      H/O left bundle branch block      History of left bundle branch block (LBBB)      Systolic heart failure, chronic      H/O cataract  2020      History of surgical removal of meniscus of knee  left in 1971      Frozen shoulder  2000      H/O Achilles tendon repair  lengthened bilaterally, 2000      Fractured skull  1968           FAMILY HISTORY:  Family history of CVA  mom, age 57    NC    Social History:Non smoker    MEDICATIONS  (STANDING):  aspirin  chewable 81 milliGRAM(s) Oral daily  atorvastatin 80 milliGRAM(s) Oral at bedtime  dextrose 5%. 1000 milliLiter(s) (100 mL/Hr) IV Continuous <Continuous>  dextrose 5%. 1000 milliLiter(s) (50 mL/Hr) IV Continuous <Continuous>  dextrose 50% Injectable 12.5 Gram(s) IV Push once  dextrose 50% Injectable 25 Gram(s) IV Push once  dextrose 50% Injectable 25 Gram(s) IV Push once  ferrous    sulfate 325 milliGRAM(s) Oral two times a day  furosemide   Injectable 80 milliGRAM(s) IV Push every 12 hours  glucagon  Injectable 1 milliGRAM(s) IntraMuscular once  heparin   Injectable 5000 Unit(s) SubCutaneous every 12 hours  insulin glargine Injectable (LANTUS) 15 Unit(s) SubCutaneous at bedtime  insulin lispro (ADMELOG) corrective regimen sliding scale   SubCutaneous three times a day before meals  levothyroxine 75 MICROGram(s) Oral daily  metoprolol succinate  milliGRAM(s) Oral daily  nystatin Powder 1 Application(s) Topical two times a day  pantoprazole    Tablet 40 milliGRAM(s) Oral before breakfast    MEDICATIONS  (PRN):  dextrose Oral Gel 15 Gram(s) Oral once PRN Blood Glucose LESS THAN 70 milliGRAM(s)/deciliter      Allergies    Ativan (Rash; Urticaria)  penicillins (Hives)    Intolerances         REVIEW OF SYSTEMS:    as above    ICU Vital Signs Last 24 Hrs  T(C): 36.4 (04 Apr 2024 12:37), Max: 36.7 (03 Apr 2024 20:41)  T(F): 97.6 (04 Apr 2024 12:37), Max: 98.1 (03 Apr 2024 20:41)  HR: 57 (04 Apr 2024 12:37) (56 - 63)  BP: 106/56 (04 Apr 2024 12:37) (106/56 - 152/66)  BP(mean): --  ABP: --  ABP(mean): --  RR: 17 (04 Apr 2024 12:37) (17 - 20)  SpO2: 93% (04 Apr 2024 12:37) (78% - 96%)    O2 Parameters below as of 04 Apr 2024 12:37  Patient On (Oxygen Delivery Method): nasal cannula  O2 Flow (L/min): 3.5          PHYSICAL EXAM:    GENERAL: NAD  HEAD:  Atraumatic, Normocephalic  EYES: EOMI, conjunctiva and sclera clear  NERVOUS SYSTEM:  Awake and Alert  CHEST/LUNG: Decreased  EXTREMITIES:  ++Edema  SKIN: No rashes No obvious ecchymosis      LABS:                                                           11.2   5.76  )-----------( 162      ( 03 Apr 2024 06:30 )             36.9     04-03    142  |  101  |  46<H>  ----------------------------<  240<H>  3.8   |  34<H>  |  2.20<H>    Ca    8.1<L>      03 Apr 2024 06:30  Phos  3.3     04-03  Mg     2.3     04-03        Urinalysis Basic - ( 03 Apr 2024 06:30 )    Color: x / Appearance: x / SG: x / pH: x  Gluc: 240 mg/dL / Ketone: x  / Bili: x / Urobili: x   Blood: x / Protein: x / Nitrite: x   Leuk Esterase: x / RBC: x / WBC x   Sq Epi: x / Non Sq Epi: x / Bacteria: x

## 2024-04-04 NOTE — PROGRESS NOTE ADULT - PROBLEM SELECTOR PLAN 3
- DUNCAN on CKD4 on admission (2.7 on ADM, baseline 2.4) likely secondary to CRS - improved w/ diuresis  - IV Diuretics to PO as above  - Monitor daily chemistry  - Nephro Dr. Edwards following

## 2024-04-04 NOTE — PROGRESS NOTE ADULT - SUBJECTIVE AND OBJECTIVE BOX
Patient is a 73y old  Female who presents with a chief complaint of Acute on chronic systolic CHF (04 Apr 2024 07:32)      INTERVAL HPI/OVERNIGHT EVENTS: No acute overnight events. Pt was seen and examined at bedside. Pt states that she feels well and denies sob overnight, at rest, and with in-bed activity. Now during Home O2 eval, SpO2 78% on RA, 93% on 3L. States she feels that her leg swelling has decreased considerably during this hospital stay, and she believes it to be at baseline currently.  Pt denies headache, dizziness, lightheadedness, fever, chills, body aches, CP, SOB, palpitations, abdominal pain, n/v/d/c, numbness/tingling.  No other complaints at this time.     MEDICATIONS  (STANDING):  aspirin  chewable 81 milliGRAM(s) Oral daily  atorvastatin 80 milliGRAM(s) Oral at bedtime  dextrose 5%. 1000 milliLiter(s) (100 mL/Hr) IV Continuous <Continuous>  dextrose 5%. 1000 milliLiter(s) (50 mL/Hr) IV Continuous <Continuous>  dextrose 50% Injectable 12.5 Gram(s) IV Push once  dextrose 50% Injectable 25 Gram(s) IV Push once  dextrose 50% Injectable 25 Gram(s) IV Push once  ferrous    sulfate 325 milliGRAM(s) Oral two times a day  glucagon  Injectable 1 milliGRAM(s) IntraMuscular once  heparin   Injectable 5000 Unit(s) SubCutaneous every 12 hours  insulin glargine Injectable (LANTUS) 20 Unit(s) SubCutaneous at bedtime  insulin lispro (ADMELOG) corrective regimen sliding scale   SubCutaneous three times a day before meals  levothyroxine 75 MICROGram(s) Oral daily  metoprolol succinate  milliGRAM(s) Oral daily  nystatin Powder 1 Application(s) Topical two times a day  pantoprazole    Tablet 40 milliGRAM(s) Oral before breakfast  torsemide 20 milliGRAM(s) Oral every 12 hours    MEDICATIONS  (PRN):  dextrose Oral Gel 15 Gram(s) Oral once PRN Blood Glucose LESS THAN 70 milliGRAM(s)/deciliter      Allergies    Ativan (Rash; Urticaria)  penicillins (Hives)    Intolerances        REVIEW OF SYSTEMS:  CONSTITUTIONAL: No fever or chills  HEENT:  No headache, no sore throat  RESPIRATORY: No cough, wheezing, or shortness of breath  CARDIOVASCULAR: No chest pain, palpitations  GASTROINTESTINAL: No abd pain, nausea, vomiting, or diarrhea  GENITOURINARY: No dysuria, or hematuria  MUSCULOSKELETAL: no myalgias     Vital Signs Last 24 Hrs  T(C): 36.7 (04 Apr 2024 05:08), Max: 36.7 (03 Apr 2024 20:41)  T(F): 98 (04 Apr 2024 05:08), Max: 98.1 (03 Apr 2024 20:41)  HR: 63 (04 Apr 2024 05:08) (56 - 66)  BP: 152/66 (04 Apr 2024 05:08) (118/73 - 152/66)  BP(mean): --  RR: 20 (04 Apr 2024 09:20) (18 - 20)  SpO2: 93% (04 Apr 2024 09:20) (78% - 98%)    Parameters below as of 04 Apr 2024 09:20  Patient On (Oxygen Delivery Method): nasal cannula  O2 Flow (L/min): 3      PHYSICAL EXAM:  GENERAL: NAD, well-appearing, on NC  HEENT:  anicteric, moist mucous membranes  CHEST/LUNG:  difficult auscultation d/t body habitus - CTA b/l, no rales, wheezes, or rhonchi  HEART:  RRR, pansystolic murmur obscuring S1 and S2 in all areas, loudest at aortic area  ABDOMEN:  obese abdomen, soft, NTND x4 quadrants  EXTREMITIES: +Lymphedema with pitting, diffuse excoriations and xerosis in BLLE  NERVOUS SYSTEM: AAO x3; answers questions and follows commands appropriately    LABS:                        11.4   6.29  )-----------( 176      ( 04 Apr 2024 07:30 )             38.0     CBC Full  -  ( 04 Apr 2024 07:30 )  WBC Count : 6.29 K/uL  Hemoglobin : 11.4 g/dL  Hematocrit : 38.0 %  Platelet Count - Automated : 176 K/uL  Mean Cell Volume : 92.0 fl  Mean Cell Hemoglobin : 27.6 pg  Mean Cell Hemoglobin Concentration : 30.0 gm/dL  Auto Neutrophil # : x  Auto Lymphocyte # : x  Auto Monocyte # : x  Auto Eosinophil # : x  Auto Basophil # : x  Auto Neutrophil % : x  Auto Lymphocyte % : x  Auto Monocyte % : x  Auto Eosinophil % : x  Auto Basophil % : x    04 Apr 2024 07:30    142    |  100    |  46     ----------------------------<  251    3.9     |  37     |  2.20     Ca    8.2        04 Apr 2024 07:30  Phos  3.2       04 Apr 2024 07:30  Mg     2.3       04 Apr 2024 07:30        Urinalysis Basic - ( 04 Apr 2024 07:30 )    Color: x / Appearance: x / SG: x / pH: x  Gluc: 251 mg/dL / Ketone: x  / Bili: x / Urobili: x   Blood: x / Protein: x / Nitrite: x   Leuk Esterase: x / RBC: x / WBC x   Sq Epi: x / Non Sq Epi: x / Bacteria: x      CAPILLARY BLOOD GLUCOSE      POCT Blood Glucose.: 237 mg/dL (04 Apr 2024 07:46)  POCT Blood Glucose.: 240 mg/dL (03 Apr 2024 21:37)  POCT Blood Glucose.: 333 mg/dL (03 Apr 2024 17:12)  POCT Blood Glucose.: 224 mg/dL (03 Apr 2024 12:03)          RADIOLOGY & ADDITIONAL TESTS:     Consultant(s) Notes Reviewed:  [x] YES  [ ] NO

## 2024-04-04 NOTE — PROGRESS NOTE ADULT - NUTRITIONAL ASSESSMENT
This patient has been assessed with a concern for Malnutrition and has been determined to have a diagnosis/diagnoses of Morbid obesity (BMI > 40).    This patient is being managed with:   Diet DASH/TLC-  Sodium & Cholesterol Restricted  Consistent Carbohydrate {No Snacks}  1500mL Fluid Restriction ([id number])  Entered: Mar 29 2024  8:20PM  
This patient has been assessed with a concern for Malnutrition and has been determined to have a diagnosis/diagnoses of Morbid obesity (BMI > 40).    This patient is being managed with:   Diet DASH/TLC-  Sodium & Cholesterol Restricted  Consistent Carbohydrate {No Snacks}  1500mL Fluid Restriction ([id number])  Entered: Mar 29 2024  8:20PM  
This patient has been assessed with a concern for Malnutrition and has been determined to have a diagnosis/diagnoses of Morbid obesity (BMI > 40).    This patient is being managed with:   Diet DASH/TLC-  Sodium & Cholesterol Restricted  Consistent Carbohydrate {No Snacks}  1500mL Fluid Restriction ([id number])  Entered: Mar 29 2024  8:20PM  
This patient has been assessed with a concern for Malnutrition and has been determined to have a diagnosis/diagnoses of Morbid obesity (BMI > 40).    This patient is being managed with:   Diet DASH/TLC-  Sodium & Cholesterol Restricted  Consistent Carbohydrate {No Snacks}  1500mL Fluid Restriction ([id number])  Entered: Mar 29 2024  8:20PM  
This patient has been assessed with a concern for Malnutrition and has been determined to have a diagnosis/diagnoses of Morbid obesity (BMI > 40).    This patient is being managed with:   Diet DASH/TLC-  Sodium & Cholesterol Restricted  Consistent Carbohydrate {No Snacks}  1500mL Fluid Restriction ([id number])  Entered: Mar 29 2024  8:20PM

## 2024-04-04 NOTE — PROGRESS NOTE ADULT - PROBLEM/PLAN-9
DISPLAY PLAN FREE TEXT
Complex Repair And Bilobe Flap Text: The defect edges were debeveled with a #15 scalpel blade.  The primary defect was closed partially with a complex linear closure.  Given the location of the remaining defect, shape of the defect and the proximity to free margins a bilobe flap was deemed most appropriate for complete closure of the defect.  Using a sterile surgical marker, an appropriate advancement flap was drawn incorporating the defect and placing the expected incisions within the relaxed skin tension lines where possible.    The area thus outlined was incised deep to adipose tissue with a #15 scalpel blade.  The skin margins were undermined to an appropriate distance in all directions utilizing iris scissors.

## 2024-04-04 NOTE — PROGRESS NOTE ADULT - ASSESSMENT
DUNCAN on CKD 4  CHF  HTN  LE Edema    -Baseline Creatinine approx 2.4  -DUNCAN likely 2/2 to CRS  -Urine lytes will be less helpful in the setting of diuretic administration  -BP well controlled  -Creatinine stable at baseline range; stable lytes  -Creatinine tolerating diuretics.  Changed to oral

## 2024-04-04 NOTE — PROGRESS NOTE ADULT - SUBJECTIVE AND OBJECTIVE BOX
Date/Time Patient Seen:  		  Referring MD:   Data Reviewed	       Patient is a 73y old  Female who presents with a chief complaint of Acute on chronic systolic CHF (04 Apr 2024 07:14)      Subjective/HPI     PAST MEDICAL & SURGICAL HISTORY:  Hypertension    Diabetes    Lymphedema    H/O left bundle branch block    History of left bundle branch block (LBBB)    Systolic heart failure, chronic    Type 1 diabetes    H/O cataract  2020    History of surgical removal of meniscus of knee  left in 1971    Frozen shoulder  2000    H/O Achilles tendon repair  lengthened bilaterally, 2000    Fractured skull  1968          Medication list         MEDICATIONS  (STANDING):  aspirin  chewable 81 milliGRAM(s) Oral daily  atorvastatin 80 milliGRAM(s) Oral at bedtime  dextrose 5%. 1000 milliLiter(s) (100 mL/Hr) IV Continuous <Continuous>  dextrose 5%. 1000 milliLiter(s) (50 mL/Hr) IV Continuous <Continuous>  dextrose 50% Injectable 12.5 Gram(s) IV Push once  dextrose 50% Injectable 25 Gram(s) IV Push once  dextrose 50% Injectable 25 Gram(s) IV Push once  ferrous    sulfate 325 milliGRAM(s) Oral two times a day  furosemide   Injectable 80 milliGRAM(s) IV Push every 12 hours  glucagon  Injectable 1 milliGRAM(s) IntraMuscular once  heparin   Injectable 5000 Unit(s) SubCutaneous every 12 hours  insulin glargine Injectable (LANTUS) 15 Unit(s) SubCutaneous at bedtime  insulin lispro (ADMELOG) corrective regimen sliding scale   SubCutaneous three times a day before meals  levothyroxine 75 MICROGram(s) Oral daily  metoprolol succinate  milliGRAM(s) Oral daily  nystatin Powder 1 Application(s) Topical two times a day  pantoprazole    Tablet 40 milliGRAM(s) Oral before breakfast    MEDICATIONS  (PRN):  dextrose Oral Gel 15 Gram(s) Oral once PRN Blood Glucose LESS THAN 70 milliGRAM(s)/deciliter         Vitals log        ICU Vital Signs Last 24 Hrs  T(C): 36.7 (04 Apr 2024 05:08), Max: 36.7 (03 Apr 2024 20:41)  T(F): 98 (04 Apr 2024 05:08), Max: 98.1 (03 Apr 2024 20:41)  HR: 63 (04 Apr 2024 05:08) (56 - 66)  BP: 152/66 (04 Apr 2024 05:08) (116/68 - 152/66)  BP(mean): --  ABP: --  ABP(mean): --  RR: 18 (04 Apr 2024 05:08) (18 - 19)  SpO2: 95% (04 Apr 2024 05:08) (86% - 98%)    O2 Parameters below as of 04 Apr 2024 05:08  Patient On (Oxygen Delivery Method): nasal cannula  O2 Flow (L/min): 2               Input and Output:  I&O's Detail    03 Apr 2024 07:01  -  04 Apr 2024 07:00  --------------------------------------------------------  IN:    Oral Fluid: 480 mL  Total IN: 480 mL    OUT:    Voided (mL): 650 mL  Total OUT: 650 mL    Total NET: -170 mL          Lab Data                        11.2   5.76  )-----------( 162      ( 03 Apr 2024 06:30 )             36.9     04-03    142  |  101  |  46<H>  ----------------------------<  240<H>  3.8   |  34<H>  |  2.20<H>    Ca    8.1<L>      03 Apr 2024 06:30  Phos  3.3     04-03  Mg     2.3     04-03              Review of Systems	      Objective     Physical Examination    heart s1s2  lung dec BS  head nc      Pertinent Lab findings & Imaging      Frederic:  NO   Adequate UO     I&O's Detail    03 Apr 2024 07:01  -  04 Apr 2024 07:00  --------------------------------------------------------  IN:    Oral Fluid: 480 mL  Total IN: 480 mL    OUT:    Voided (mL): 650 mL  Total OUT: 650 mL    Total NET: -170 mL               Discussed with:     Cultures:	        Radiology

## 2024-04-04 NOTE — PROGRESS NOTE ADULT - SUBJECTIVE AND OBJECTIVE BOX
[facility] -- Howard Kimble Pannella, Patel, Savella, Goodger, Cohen: Office # [phone]    Follow Up:  ADHF, Edema    Subjective/Observations: Patient seen and examined. Patient awake, alert, resting in bed. No complaints of chest pain, dyspnea, palpitations or dizziness. No signs of orthopnea or PND. Tolerating O2 via nasal cannula. O2 dropped to 86% at rest     REVIEW OF SYSTEMS: All other review of systems are negative unless indicated above    PAST MEDICAL & SURGICAL HISTORY:  Hypertension      Hypertension      Diabetes      Lymphedema      H/O left bundle branch block      History of left bundle branch block (LBBB)      Systolic heart failure, chronic      Type 1 diabetes      H/O cataract  2020      History of surgical removal of meniscus of knee  left in 1971      Frozen shoulder  2000      H/O Achilles tendon repair  lengthened bilaterally, 2000      Fractured skull  1968      MEDICATIONS  (STANDING):  aspirin  chewable 81 milliGRAM(s) Oral daily  atorvastatin 80 milliGRAM(s) Oral at bedtime  dextrose 5%. 1000 milliLiter(s) (50 mL/Hr) IV Continuous <Continuous>  dextrose 5%. 1000 milliLiter(s) (100 mL/Hr) IV Continuous <Continuous>  dextrose 50% Injectable 25 Gram(s) IV Push once  dextrose 50% Injectable 12.5 Gram(s) IV Push once  dextrose 50% Injectable 25 Gram(s) IV Push once  ferrous    sulfate 325 milliGRAM(s) Oral two times a day  furosemide   Injectable 80 milliGRAM(s) IV Push every 12 hours  glucagon  Injectable 1 milliGRAM(s) IntraMuscular once  heparin   Injectable 5000 Unit(s) SubCutaneous every 12 hours  insulin glargine Injectable (LANTUS) 15 Unit(s) SubCutaneous at bedtime  insulin lispro (ADMELOG) corrective regimen sliding scale   SubCutaneous three times a day before meals  levothyroxine 75 MICROGram(s) Oral daily  metoprolol succinate  milliGRAM(s) Oral daily  nystatin Powder 1 Application(s) Topical two times a day  pantoprazole    Tablet 40 milliGRAM(s) Oral before breakfast    MEDICATIONS  (PRN):  dextrose Oral Gel 15 Gram(s) Oral once PRN Blood Glucose LESS THAN 70 milliGRAM(s)/deciliter    Allergies  Ativan (Rash; Urticaria)  penicillins (Hives)      Vital Signs Last 24 Hrs  T(C): 36.7 (04 Apr 2024 05:08), Max: 36.7 (03 Apr 2024 20:41)  T(F): 98 (04 Apr 2024 05:08), Max: 98.1 (03 Apr 2024 20:41)  HR: 63 (04 Apr 2024 05:08) (56 - 66)  BP: 152/66 (04 Apr 2024 05:08) (116/68 - 152/66)  BP(mean): --  RR: 18 (04 Apr 2024 05:08) (18 - 19)  SpO2: 95% (04 Apr 2024 05:08) (86% - 98%)    Parameters below as of 04 Apr 2024 05:08  Patient On (Oxygen Delivery Method): nasal cannula  O2 Flow (L/min): 2    I&O's Summary    03 Apr 2024 07:01  -  04 Apr 2024 07:00  --------------------------------------------------------  IN: 480 mL / OUT: 650 mL / NET: -170 mL          TELE: Not on telemetry   PHYSICAL EXAM:  Constitutional: NAD, awake and alert, Obese   HEENT: Moist Mucous Membranes, Anicteric  Pulmonary: Non-labored, breath sounds are clear bilaterally, No wheezing, rales or rhonchi  Cardiovascular: Regular, S1 and S2, + murmurs, No rubs, gallops or clicks  Gastrointestinal:  soft, nontender, nondistended   Lymph: 2-3+ peripheral edema chronic, improving, No lymphadenopathy.   Skin: No visible rashes or ulcers.  Psych:  Mood & affect appropriate    LABS: All Labs Reviewed:                        11.2   5.76  )-----------( 162      ( 03 Apr 2024 06:30 )             36.9                         12.0   5.98  )-----------( 180      ( 02 Apr 2024 06:25 )             39.6     03 Apr 2024 06:30    142    |  101    |  46     ----------------------------<  240    3.8     |  34     |  2.20   02 Apr 2024 06:25    143    |  101    |  47     ----------------------------<  147    3.7     |  34     |  2.30     Ca    8.1        03 Apr 2024 06:30  Ca    8.1        02 Apr 2024 06:25  Phos  3.3       03 Apr 2024 06:30  Phos  3.4       02 Apr 2024 06:25  Mg     2.3       03 Apr 2024 06:30  Mg     2.3       02 Apr 2024 06:25       Troponin I, High Sensitivity Result: 22.2 ng/L (03-29-24 @ 18:40)    12 Lead ECG:   Ventricular Rate 57 BPM    Atrial Rate 57 BPM    P-R Interval 312 ms    QRS Duration 168 ms    Q-T Interval 520 ms    QTC Calculation(Bazett) 506 ms    P Axis 38 degrees    R Axis 212 degrees    T Axis -31 degrees    Diagnosis Line Sinus bradycardia with 1st degree AV block  Right superior axis deviation  Nonspecific intraventricular block  Abnormal ECG  When compared with ECG of 09-JAN-2024 09:46,  Nonspecific T wave abnormality now evident in Inferior leads  Nonspecific T wave abnormality, improved in Lateral leads  Confirmed by Dale Lawrence MD ([id number]) on 3/31/2024 2:43:25 PM (03-29-24 @ 17:08)      TRANSTHORACIC ECHOCARDIOGRAM REPORT  ________________________________________________________________________________                                      _______       Pt. Name:       EVELYN LOPEZ Study Date:    4/1/2024  MRN:            [id number]         YOB: 1950  Accession #:    [id number]        Age:           73 years  Account#:       [id number]       Gender:        F  Heart Rate:                      Height:        71.00 in (180.34 cm)  Rhythm:                          Weight:        330.69 lb (150.00 kg)  Blood Pressure: 116/68 mmHg      BSA/BMI:       2.61 m² / 46.12 kg/m²  ________________________________________________________________________________________  Referring Physician:    [id number] Luis Enrique Luo  Interpreting Physician: Pia Sutton MD  Primary Sonographer:    Marlon Chen    CPT:               ECHO TTE WO CON COMP W DOPP - 70708.m  Indication(s):     Dyspnea, unspecified - R06.00  Procedure:         Transthoracic echocardiogram with 2-D, M-mode and complete                     spectral and color flow Doppler.  Ordering Location: [facility]  Admission Status:  Inpatient  Study Information: Image quality for this study is technically difficult.    _______________________________________________________________________________________     CONCLUSIONS:      1. Technically difficult image quality.   2. Left ventricular systolic function is moderately to severely decreased with an ejection fraction of 30 % by Nunes's method of disks.   3. Mildto moderate left ventricular hypertrophy.   4. The right ventricle is not well visualized. Based on visual assessment, the right ventricle appears mildly enlarged. mildly reduced systolic function.   5. The left atrium is mildly dilated.   6. Trace aortic regurgitation.   7. Mild mitral regurgitation.   8. Moderate to severe aortic stenosis. There is a Vmax of 3.66m/s, Mean gradient of 26mmHg and a DI of 0.19. The JUAN DANIEL is calculated as 0.61cm2 by continuity. This likely represents low flow low gradient AS.   9. Estimated pulmonary artery systolic pressure is 59 mmHg, consistent with moderate pulmonary hypertension.  10. The inferior vena cava is dilated measuring 2.60 cm in diameter, (dilated >2.1cm) with normal inspiratory collapse (normal >50%) consistent with mildly elevated right atrial pressure (~8, range 5-10mmHg).    ________________________________________________________________________________________  FINDINGS:     Left Ventricle:  Left ventricular systolic function is moderately to severely decreased with a calculated ejection fraction of 30 % by the Nunes's biplane method of disks. Mild to moderate left ventricular hypertrophy.     Right Ventricle:  The right ventricle is not well visualized. Based on visual assessment, the right ventricle appears mildly enlarged. The right ventricular cavity is mildly enlarged in size and mildly reduced systolic function. Tricuspid annular plane systolic excursion (TAPSE) is 2.4 cm (normal >=1.7 cm).     Left Atrium:  The left atrium is mildly dilated with an indexed volume of 34.20 ml/m².     Right Atrium:  The right atrium is normal in size.     Aortic Valve:  There is calcification of the aortic valve leaflets. There is moderate to severe aortic stenosis. The peak transaorticvelocity is 3.66 m/s, peak transaortic gradient is 53.6 mmHg and mean transaortic gradient is 26.0 mmHg with an LVOT/aortic valve VTI ratio of 0.19. The aortic valve area is estimated at 0.61 cm² by the continuity equation and 1.16 cm² by 2D planimetry. There is trace aortic regurgitation.     Mitral Valve:  There is mild calcification of the mitral valve annulus. Mitral valve leaflets are diffusely calcified. There is mild mitral regurgitation.     Tricuspid Valve:  There is mild tricuspid regurgitation. Estimated pulmonary artery systolic pressure is 59 mmHg, consistent with moderate pulmonary hypertension.     Pulmonic Valve:  The pulmonic valve was not well visualized. There is trace pulmonic regurgitation.     Aorta:  The aortic root at the sinuses of Valsalva is normal in size, measuring 2.90 cm (indexed 1.11 cm/m²).     Systemic Veins:  The inferior vena cava is dilated measuring 2.60 cm in diameter, (dilated >2.1cm) with normal inspiratory collapse (normal >50%) consistent with mildly elevated right atrial pressure (~8, range 5-10mmHg).  ____________________________________________________________________  QUANTITATIVE DATA:  Left Ventricle Measurements: (Indexed to BSA)     IVSd (2D):   1.3 cm  LVPWd (2D):  1.2 cm  LVIDd (2D):  5.4 cm  LVIDs (2D):  4.7 cm  LV Mass:     284 g  108.7 g/m²  LV Vol d, MOD A2C: 138.0 ml 52.85 ml/m²  LV Vol d, MOD A4C: 114.0 ml 43.65 ml/m²  LV Vol d, MOD BP:  128.3 ml 49.14 ml/m²  LV Vol s, MOD A2C: 86.9 ml  33.28 ml/m²  LV Vol s, MOD A4C: 88.5ml  33.89 ml/m²  LV Vol s, MOD BP:  89.5 ml  34.28 ml/m²  LVOT SV MOD BP:    38.8 ml  LV EF% MOD BP:     30 %     MV E Vmax:    0.75 m/s  MV A Vmax:    0.97 m/s  MV E/A:       0.77  e' lateral:   8.16 cm/s  e' medial:    3.59 cm/s  E/e' lateral: 9.18  E/e' medial:  20.86  E/e' Average: 12.75  MV DT:        268 msec    Aorta Measurements: (Normal range) (Indexed to BSA)     Sinuses of Valsalva: 2.90 cm (2.7 - 3.3 cm)       Left Atrium Measurements: (Indexed to BSA)  LA Diam 2D: 4.90 cm    Right Ventricle Measurements:     TAPSE:            2.4 cm  RV Base (RVID1):  4.9 cm  RV Mid (RVID2):   4.1 cm  RV Major (RVID3): 8.3 cm       LVOT / RVOT/ Qp/Qs Data: (Indexed to BSA)  LVOT Diameter: 2.00 cm  LVOT Vmax:     0.67 m/s  LVOT VTI:      19.00 cm  LVOT SV:       59.7 ml  22.86 ml/m²    Aortic Valve Measurements:  AV Vmax:                3.7 m/s  AV Peak Gradient:       53.6 mmHg  AV Mean Gradient:       26.0 mmHg  AV VTI:                 97.8 cm  AV VTI Ratio:           0.19  AoV EOA, Contin:      0.61 cm²  AoV EOA, Contin i:      0.23 cm²/m²  AoV area, 2D planim:    1.16 cm²  AoV Dimensionless Index 0.19    Mitral Valve Measurements:     MV E Vmax: 0.7 m/s  MV A Vmax: 1.0 m/s  MV E/A:    0.8       Tricuspid Valve Measurements:     TR Vmax:          3.6 m/s  TR Peak Gradient: 51.3 mmHg  RA Pressure:      8 mmHg  PASP:             59 mmHg    ________________________________________________________________________________________  Electronically signed on 4/1/2024 at 2:34:33 PM by Pia Sutton MD         *** Final ***   [facility] -- Howadr Kimble Pannella, Patel, Savella, Goodger, Cohen: Office # [phone]    Follow Up:  ADHF, Edema    Subjective/Observations: Patient seen and examined. Patient awake, alert, resting in bed. No complaints of chest pain, dyspnea, palpitations or dizziness. No signs of orthopnea or PND. Tolerating O2 via nasal cannula. O2 dropped to 86% at rest     REVIEW OF SYSTEMS: All other review of systems are negative unless indicated above    PAST MEDICAL & SURGICAL HISTORY:  Hypertension      Hypertension      Diabetes      Lymphedema      H/O left bundle branch block      History of left bundle branch block (LBBB)      Systolic heart failure, chronic      Type 1 diabetes      H/O cataract  2020      History of surgical removal of meniscus of knee  left in 1971      Frozen shoulder  2000      H/O Achilles tendon repair  lengthened bilaterally, 2000      Fractured skull  1968      MEDICATIONS  (STANDING):  aspirin  chewable 81 milliGRAM(s) Oral daily  atorvastatin 80 milliGRAM(s) Oral at bedtime  dextrose 5%. 1000 milliLiter(s) (50 mL/Hr) IV Continuous <Continuous>  dextrose 5%. 1000 milliLiter(s) (100 mL/Hr) IV Continuous <Continuous>  dextrose 50% Injectable 25 Gram(s) IV Push once  dextrose 50% Injectable 12.5 Gram(s) IV Push once  dextrose 50% Injectable 25 Gram(s) IV Push once  ferrous    sulfate 325 milliGRAM(s) Oral two times a day  furosemide   Injectable 80 milliGRAM(s) IV Push every 12 hours  glucagon  Injectable 1 milliGRAM(s) IntraMuscular once  heparin   Injectable 5000 Unit(s) SubCutaneous every 12 hours  insulin glargine Injectable (LANTUS) 15 Unit(s) SubCutaneous at bedtime  insulin lispro (ADMELOG) corrective regimen sliding scale   SubCutaneous three times a day before meals  levothyroxine 75 MICROGram(s) Oral daily  metoprolol succinate  milliGRAM(s) Oral daily  nystatin Powder 1 Application(s) Topical two times a day  pantoprazole    Tablet 40 milliGRAM(s) Oral before breakfast    MEDICATIONS  (PRN):  dextrose Oral Gel 15 Gram(s) Oral once PRN Blood Glucose LESS THAN 70 milliGRAM(s)/deciliter    Allergies  Ativan (Rash; Urticaria)  penicillins (Hives)      Vital Signs Last 24 Hrs  T(C): 36.7 (04 Apr 2024 05:08), Max: 36.7 (03 Apr 2024 20:41)  T(F): 98 (04 Apr 2024 05:08), Max: 98.1 (03 Apr 2024 20:41)  HR: 63 (04 Apr 2024 05:08) (56 - 66)  BP: 152/66 (04 Apr 2024 05:08) (116/68 - 152/66)  BP(mean): --  RR: 18 (04 Apr 2024 05:08) (18 - 19)  SpO2: 95% (04 Apr 2024 05:08) (86% - 98%)    Parameters below as of 04 Apr 2024 05:08  Patient On (Oxygen Delivery Method): nasal cannula  O2 Flow (L/min): 2    I&O's Summary    03 Apr 2024 07:01  -  04 Apr 2024 07:00  --------------------------------------------------------  IN: 480 mL / OUT: 650 mL / NET: -170 mL          TELE: Not on telemetry   PHYSICAL EXAM:  Constitutional: NAD, awake and alert, Obese   HEENT: Moist Mucous Membranes, Anicteric  Pulmonary: Non-labored, breath sounds are clear bilaterally, No wheezing, rales or rhonchi  Cardiovascular: Regular, S1 and S2, + murmurs, No rubs, gallops or clicks  Gastrointestinal:  soft, nontender, nondistended   Lymph: 2-3+ peripheral edema chronic, improving, No lymphadenopathy.   Skin: No visible rashes or ulcers.  Psych:  Mood & affect appropriate    LABS: All Labs Reviewed:                        11.2   5.76  )-----------( 162      ( 03 Apr 2024 06:30 )             36.9                         12.0   5.98  )-----------( 180      ( 02 Apr 2024 06:25 )             39.6     03 Apr 2024 06:30    142    |  101    |  46     ----------------------------<  240    3.8     |  34     |  2.20   02 Apr 2024 06:25    143    |  101    |  47     ----------------------------<  147    3.7     |  34     |  2.30     Ca    8.1        03 Apr 2024 06:30  Ca    8.1        02 Apr 2024 06:25  Phos  3.3       03 Apr 2024 06:30  Phos  3.4       02 Apr 2024 06:25  Mg     2.3       03 Apr 2024 06:30  Mg     2.3       02 Apr 2024 06:25       Troponin I, High Sensitivity Result: 22.2 ng/L (03-29-24 @ 18:40)    12 Lead ECG:   Ventricular Rate 57 BPM    Atrial Rate 57 BPM    P-R Interval 312 ms    QRS Duration 168 ms    Q-T Interval 520 ms    QTC Calculation(Bazett) 506 ms    P Axis 38 degrees    R Axis 212 degrees    T Axis -31 degrees    Diagnosis Line Sinus bradycardia with 1st degree AV block  Right superior axis deviation  Nonspecific intraventricular block  Abnormal ECG  When compared with ECG of 09-JAN-2024 09:46,  Nonspecific T wave abnormality now evident in Inferior leads  Nonspecific T wave abnormality, improved in Lateral leads  Confirmed by Dale Lawrence MD ([id number]) on 3/31/2024 2:43:25 PM (03-29-24 @ 17:08)      TRANSTHORACIC ECHOCARDIOGRAM REPORT  ________________________________________________________________________________                                      _______       Pt. Name:       EVELYN LOPEZ Study Date:    4/1/2024  MRN:            [id number]         YOB: 1950  Accession #:    [id number]        Age:           73 years  Account#:       [id number]       Gender:        F  Heart Rate:                      Height:        71.00 in (180.34 cm)  Rhythm:                          Weight:        330.69 lb (150.00 kg)  Blood Pressure: 116/68 mmHg      BSA/BMI:       2.61 m² / 46.12 kg/m²  ________________________________________________________________________________________  Referring Physician:    [id number] Luis Enrique Luo  Interpreting Physician: Pia Sutton MD  Primary Sonographer:    Marlon Chen    CPT:               ECHO TTE WO CON COMP W DOPP - 61535.m  Indication(s):     Dyspnea, unspecified - R06.00  Procedure:         Transthoracic echocardiogram with 2-D, M-mode and complete                     spectral and color flow Doppler.  Ordering Location: [facility]  Admission Status:  Inpatient  Study Information: Image quality for this study is technically difficult.    _______________________________________________________________________________________     CONCLUSIONS:      1. Technically difficult image quality.   2. Left ventricular systolic function is moderately to severely decreased with an ejection fraction of 30 % by Nunes's method of disks.   3. Mildto moderate left ventricular hypertrophy.   4. The right ventricle is not well visualized. Based on visual assessment, the right ventricle appears mildly enlarged. mildly reduced systolic function.   5. The left atrium is mildly dilated.   6. Trace aortic regurgitation.   7. Mild mitral regurgitation.   8. Moderate to severe aortic stenosis. There is a Vmax of 3.66m/s, Mean gradient of 26mmHg and a DI of 0.19. The JUAN DANIEL is calculated as 0.61cm2 by continuity. This likely represents low flow low gradient AS.   9. Estimated pulmonary artery systolic pressure is 59 mmHg, consistent with moderate pulmonary hypertension.  10. The inferior vena cava is dilated measuring 2.60 cm in diameter, (dilated >2.1cm) with normal inspiratory collapse (normal >50%) consistent with mildly elevated right atrial pressure (~8, range 5-10mmHg).    ________________________________________________________________________________________  FINDINGS:     Left Ventricle:  Left ventricular systolic function is moderately to severely decreased with a calculated ejection fraction of 30 % by the Nunes's biplane method of disks. Mild to moderate left ventricular hypertrophy.     Right Ventricle:  The right ventricle is not well visualized. Based on visual assessment, the right ventricle appears mildly enlarged. The right ventricular cavity is mildly enlarged in size and mildly reduced systolic function. Tricuspid annular plane systolic excursion (TAPSE) is 2.4 cm (normal >=1.7 cm).     Left Atrium:  The left atrium is mildly dilated with an indexed volume of 34.20 ml/m².     Right Atrium:  The right atrium is normal in size.     Aortic Valve:  There is calcification of the aortic valve leaflets. There is moderate to severe aortic stenosis. The peak transaorticvelocity is 3.66 m/s, peak transaortic gradient is 53.6 mmHg and mean transaortic gradient is 26.0 mmHg with an LVOT/aortic valve VTI ratio of 0.19. The aortic valve area is estimated at 0.61 cm² by the continuity equation and 1.16 cm² by 2D planimetry. There is trace aortic regurgitation.     Mitral Valve:  There is mild calcification of the mitral valve annulus. Mitral valve leaflets are diffusely calcified. There is mild mitral regurgitation.     Tricuspid Valve:  There is mild tricuspid regurgitation. Estimated pulmonary artery systolic pressure is 59 mmHg, consistent with moderate pulmonary hypertension.     Pulmonic Valve:  The pulmonic valve was not well visualized. There is trace pulmonic regurgitation.     Aorta:  The aortic root at the sinuses of Valsalva is normal in size, measuring 2.90 cm (indexed 1.11 cm/m²).     Systemic Veins:  The inferior vena cava is dilated measuring 2.60 cm in diameter, (dilated >2.1cm) with normal inspiratory collapse (normal >50%) consistent with mildly elevated right atrial pressure (~8, range 5-10mmHg).  ____________________________________________________________________  QUANTITATIVE DATA:  Left Ventricle Measurements: (Indexed to BSA)     IVSd (2D):   1.3 cm  LVPWd (2D):  1.2 cm  LVIDd (2D):  5.4 cm  LVIDs (2D):  4.7 cm  LV Mass:     284 g  108.7 g/m²  LV Vol d, MOD A2C: 138.0 ml 52.85 ml/m²  LV Vol d, MOD A4C: 114.0 ml 43.65 ml/m²  LV Vol d, MOD BP:  128.3 ml 49.14 ml/m²  LV Vol s, MOD A2C: 86.9 ml  33.28 ml/m²  LV Vol s, MOD A4C: 88.5ml  33.89 ml/m²  LV Vol s, MOD BP:  89.5 ml  34.28 ml/m²  LVOT SV MOD BP:    38.8 ml  LV EF% MOD BP:     30 %     MV E Vmax:    0.75 m/s  MV A Vmax:    0.97 m/s  MV E/A:       0.77  e' lateral:   8.16 cm/s  e' medial:    3.59 cm/s  E/e' lateral: 9.18  E/e' medial:  20.86  E/e' Average: 12.75  MV DT:        268 msec    Aorta Measurements: (Normal range) (Indexed to BSA)     Sinuses of Valsalva: 2.90 cm (2.7 - 3.3 cm)       Left Atrium Measurements: (Indexed to BSA)  LA Diam 2D: 4.90 cm    Right Ventricle Measurements:     TAPSE:            2.4 cm  RV Base (RVID1):  4.9 cm  RV Mid (RVID2):   4.1 cm  RV Major (RVID3): 8.3 cm       LVOT / RVOT/ Qp/Qs Data: (Indexed to BSA)  LVOT Diameter: 2.00 cm  LVOT Vmax:     0.67 m/s  LVOT VTI:      19.00 cm  LVOT SV:       59.7 ml  22.86 ml/m²    Aortic Valve Measurements:  AV Vmax:                3.7 m/s  AV Peak Gradient:       53.6 mmHg  AV Mean Gradient:       26.0 mmHg  AV VTI:                 97.8 cm  AV VTI Ratio:           0.19  AoV EOA, Contin:      0.61 cm²  AoV EOA, Contin i:      0.23 cm²/m²  AoV area, 2D planim:    1.16 cm²  AoV Dimensionless Index 0.19    Mitral Valve Measurements:     MV E Vmax: 0.7 m/s  MV A Vmax: 1.0 m/s  MV E/A:    0.8       Tricuspid Valve Measurements:     TR Vmax:          3.6 m/s  TR Peak Gradient: 51.3 mmHg  RA Pressure:      8 mmHg  PASP:             59 mmHg    ________________________________________________________________________________________  Electronically signed on 4/1/2024 at 2:34:33 PM by Pia Sutton MD         *** Final ***

## 2024-04-04 NOTE — CASE MANAGEMENT PROGRESS NOTE - NSCMPROGRESSNOTE_GEN_ALL_CORE
Discussed patient on rounds this morning and with MD. Per MD, anticipate discharge home tomorrow. Patient has transitioned to po Torsemide today. Per MD, patient will be discharged home on home oxygen. Referral sent to [facility] with request for POC to be delivered to the patient at the bedside upon approval. CM met with the patient at the bedside to discuss transition planning and home oxygen. Patient is in agreement and stated her partner Melissa will be here tomorrow and CM can discuss transition planning with her tomorrow. CM remains available.

## 2024-04-04 NOTE — PROGRESS NOTE ADULT - ASSESSMENT
73F with a PMHx of Chronic Systolic CHF (last TTE with EF 38%), moderate aortic stenosis, known LBBB, HTN, DM1, CKD, hypothyroidism, and chronic lymphedema presents to the ED with shortness of breath over the past two weeks which has been acutely worsening.    CHF  pulm HTN  LELIA [facility] eval -   Obesity  AS  valv heart disease  CKD  DM  Atelectasis  Lymphedema    rrt - code stroke this am   cardio f/u  neuro eval  CNS imaging  w/u in progress  on o2 support - NC    chronic Systolic HF (EF 38%), mod AS, HTN, LBBB, Lymphedema   - Has known systolic HF, EF 38% (2023), mod AS.    diuresis in progress  cvs rx regimen  cardio eval and follow up noted - follows with Dr Nathan in the office -   dietary discretion  monitor VS and Sat  goal sat > 88 pct  for now nocturnal o2 - VBG c/w LELIA OHS - assess for LELIA OHS - outpatient PSG and sleep apnea testing  weight management discussion   73F with a PMHx of Chronic Systolic CHF (last TTE with EF 38%), moderate aortic stenosis, known LBBB, HTN, DM1, CKD, hypothyroidism, and chronic lymphedema presents to the ED with shortness of breath over the past two weeks which has been acutely worsening.    CHF  pulm HTN  LELIA [facility] eval -   Obesity  AS  valv heart disease  CKD  DM  Atelectasis  Lymphedema    vbg c/w lelia ohs  on lasix  monitor serum co2 and for contraction alkalosis  replete lytes  on o2 support - wean as tolerated  pt did not wish for CPAP trial while in the hospital    chronic Systolic HF (EF 38%), mod AS, HTN, LBBB, Lymphedema   - Has known systolic HF, EF 38% (2023), mod AS.    diuresis in progress  cvs rx regimen  cardio eval and follow up noted - follows with Dr Nathan in the office -   dietary discretion  monitor VS and Sat  goal sat > 88 pct  for now nocturnal o2 - VBG c/w LELIA O[facility] - assess for LELIA O[facility] - outpatient PSG and sleep apnea testing  weight management discussion

## 2024-04-04 NOTE — PROGRESS NOTE ADULT - PROBLEM SELECTOR PLAN 9
DVT PPX: SC Heparin
DVT PPX: SC Heparin
DVT PPX: SC Heparin    #Dispo: Prepare for discharge tomorrow with home O2
DVT PPX: SC Heparin

## 2024-04-04 NOTE — DISCHARGE NOTE NURSING/CASE MANAGEMENT/SOCIAL WORK - PATIENT PORTAL LINK FT
You can access the FollowMyHealth Patient Portal offered by [facility] by registering at the following website: [email or web address]. By joining [employer]’s FollowMyHealth portal, you will also be able to view your health information using other applications (apps) compatible with our system.

## 2024-04-04 NOTE — PROGRESS NOTE ADULT - TIME BILLING
Reviewing chart notes and data, reviewing telemetry monitor records, face to face time counseling the patient, coordinating care with SW/CM at [facility].   Pt seen and examined. Case discussed with resident. Agree with assessment and plan above (edited by me in detail)
Reviewing chart notes and data, reviewing telemetry monitor records, face to face time counseling the patient, coordinating care with SW/CM at [facility].   Pt seen and examined. Case discussed with resident. Agree with assessment and plan above (edited by me in detail)
as above
as above

## 2024-04-04 NOTE — PROGRESS NOTE ADULT - ASSESSMENT
73F with Systolic HF (EF 38%), mod AS, HTN, T1DM, LBBB, CKD, and lymphedema presented to the ED c/o SOB, CURTIS, orthopnea, and edema for >2 weeks.  States, she has been compliant with her diuretic.  However, admits to be drinking fluids very judiciously (at least 3L/day). Admitted with acute on chr CHF exacerbation. Follows with Dr. Luong    Acute on chronic Systolic HF (EF 38%), mod AS, HTN, LBBB, Lymphedema   - Has known systolic HF and mod AS.    - Repeat TTE showed EF 30%, mild-mod LVH, mildly reduced RVF, mod-severe AS (.61 cmsq), mod pHTN  - On home Torsemide 20 mg daily, Eplerenone 25 mg daily, and Toprol  mg daily  - Compliant with all meds including Torsemide, though, dose was reduced to daily.  Additionally, she has been drinking fluids excessively  - Continue to hold home Torsemide and home Eplerenone for now  - BNP:  <--71934  - Volume status improving   - Continue Lasix to 80 mg q12H.    - Creatinine:  2.20  - Bicarb:  <--34,  <--34,  <--36,  <--29,  <--27,  <--29  - Avoid nephrotoxics.  Renal following  - Monitor for contraction alkalosis  - Continue home Toprol XL  - Fluid restriction of 1.5L/day  - Strict I/O's and daily weights.  Patient has known lymphedema  - She will be discharged on Torsemide 20 mg q12H    - Has a known LBBB  - Had LHC in 7/2022 showing non-obstructive CAD  - Troponin: <-22  - Continue ASA and statin    - BP stable and controlled   - Monitor and replete lytes, keep K>4, Mg>2.  - Will continue to follow.    Jose E Felipe, MS FNP, AGACNP  Nurse Practitioner- Cardiology   Please call on TEAMS   73F with Systolic HF (EF 38%), mod AS, HTN, T1DM, LBBB, CKD, and lymphedema presented to the ED c/o SOB, CURTIS, orthopnea, and edema for >2 weeks.  States, she has been compliant with her diuretic.  However, admits to be drinking fluids very judiciously (at least 3L/day). Admitted with acute on chr CHF exacerbation. Follows with Dr. Luong    Acute on chronic Systolic HF (EF 38%), mod AS, HTN, LBBB, Lymphedema   - Has known systolic HF and mod AS.    - Repeat TTE showed EF 30%, mild-mod LVH, mildly reduced RVF, mod-severe AS (.61 cmsq), mod pHTN  - On home Torsemide 20 mg daily, Eplerenone 25 mg daily, and Toprol  mg daily  - Compliant with all meds including Torsemide, though, dose was reduced to daily.  Additionally, she has been drinking fluids excessively  - Continue to hold home Torsemide and home Eplerenone for now  - BNP:  <--00045  - Volume status improving   - Continue Lasix to 80 mg q12H.    - Creatinine:  2.20  - Bicarb:  <--37  - Avoid nephrotoxics.  Renal following  - Monitor for contraction alkalosis  - Continue home Toprol XL  - Fluid restriction of 1.5L/day  - Strict I/O's and daily weights.  Patient has known lymphedema  - She will be discharged on Torsemide 20 mg q12H    - Has a known LBBB  - Had LHC in 7/2022 showing non-obstructive CAD  - Troponin: <-22  - Continue ASA and statin    - BP stable and controlled   - Monitor and replete lytes, keep K>4, Mg>2.  - Will continue to follow.    Jose E Felipe, MS FNP, AGACNP  Nurse Practitioner- Cardiology   Please call on TEAMS   73F with Systolic HF (EF 38%), mod AS, HTN, T1DM, LBBB, CKD, and lymphedema presented to the ED c/o SOB, CURTIS, orthopnea, and edema for >2 weeks.  States, she has been compliant with her diuretic.  However, admits to be drinking fluids very judiciously (at least 3L/day). Admitted with acute on chr CHF exacerbation. Follows with Dr. Luong    Acute on chronic Systolic HF (EF 38%), mod AS, HTN, LBBB, Lymphedema   - Has known systolic HF and mod AS.    - Repeat TTE showed EF 30%, mild-mod LVH, mildly reduced RVF, mod-severe AS (.61 cmsq), mod pHTN  - On home Torsemide 20 mg daily, Eplerenone 25 mg daily, and Toprol  mg daily  - Compliant with all meds including Torsemide, though, dose was reduced to daily.  Additionally, she has been drinking fluids excessively  - Continue to hold home Torsemide and home Eplerenone for now  - BNP:  <--16189  - Volume status improving   - Continue Lasix 80 mg q12H.    - Creatinine:  2.20  - Bicarb:  <--37  - Avoid nephrotoxics.  Renal following  - Monitor for contraction alkalosis  - Continue home Toprol XL  - Fluid restriction of 1.5L/day  - Strict I/O's and daily weights.  Patient has known lymphedema  - She will be discharged on Torsemide 20 mg q12H    - Has a known LBBB  - Had LHC in 7/2022 showing non-obstructive CAD  - Troponin: <-22  - Continue ASA and statin    - BP stable and controlled   - Monitor and replete lytes, keep K>4, Mg>2.  - Will continue to follow.    Jose E Felipe, MS FNP, AGACNP  Nurse Practitioner- Cardiology   Please call on TEAMS

## 2024-04-04 NOTE — PROGRESS NOTE ADULT - PROBLEM SELECTOR PLAN 2
- Suspect LELIA/OHS  - Home O2 Eval: 78% on RA this AM, 93% on 3LNC  - Will require home O2 on discharge  - Pulm Dr. Durate consulted, recommending LELIA/OHS eval outpatient  - O2 qhs while inpatient - Suspect LELIA/OHS  - Home O2 Eval: 78% on RA this AM, 93% on 3LNC  - Will require home O2 on discharge  - Pulm Dr. Duarte consulted, recommending LELAI/OHS eval outpatient  - O2 qhs and PRN while inpatient

## 2024-04-04 NOTE — PROGRESS NOTE ADULT - ATTENDING COMMENTS
73F with a PMHx of Chronic Systolic CHF (last TTE with EF 38%), moderate aortic stenosis, known LBBB, HTN, DM2, CKD, hypothyroidism, and chronic lymphedema   admitted with acute on chronic systolic CHF and DUNCAN on CKD 4.   Continue IV Lasix BID. Strict I&Os, daily weights.   Acute hypoxic respiratory failure 2/2 systolic CHF decompensation. Wean off supplemental O2 with appropriate SPO2.
73F with a PMHx of Chronic Systolic CHF (last TTE with EF 38%), moderate aortic stenosis, known LBBB, HTN, DM2, CKD, hypothyroidism, and chronic lymphedema   admitted with acute on chronic systolic CHF and DUNCAN on CKD 4. Cr improving, monitor closely while on Lasix  Continue IV Lasix BID. Strict I&Os, daily weights.   Acute hypoxic respiratory failure 2/2 systolic CHF decompensation. Weaned off supplemental O2 with appropriate SPO2 - now on RA.    PT melly
73F with a PMHx of Chronic Systolic CHF (last TTE with EF 38%), moderate aortic stenosis, known LBBB, HTN, DM1, CKD4, hypothyroidism, and chronic lymphedema admitted with acute on chronic systolic CHF and DUNCAN on CKD4 - Cardiorenal syndrome.    Patient seen and examined at bedside. States she is feeling well, denies any chest pain, SOB, abd pain. Patient states her legs are feeling lighter and more comfortable. Cr improving, bicarb stable on AM labs. Continue diuresis with IV lasix BID for now, will need to discuss with cardio re: timing to transition to PO diuretics. Maintain strict Is and Os, daily weights. Monitor Cr closely.
73F with a PMHx of Chronic Systolic CHF (last TTE with EF 38%), moderate aortic stenosis, known LBBB, HTN, DM1, CKD4, hypothyroidism, and chronic lymphedema admitted with acute on chronic systolic CHF and DUNCAN on CKD4 - Cardiorenal syndrome.    Patient seen and examined at bedside. States she is feeling well, denies any chest pain, SOB, abd pain. Cr stable on AM labs. Discussed with Cardio, continue diuresis with IV lasix BID today, then transition to torsemide 40mg BID PO in AM. Maintain strict Is and Os, daily weights. Monitor Cr closely.
73F with a PMHx of Chronic Systolic CHF (last TTE with EF 38%), moderate aortic stenosis, known LBBB, HTN, DM1, CKD4, hypothyroidism, and chronic lymphedema admitted with acute on chronic systolic CHF and DUNCAN on CKD4 - Cardiorenal syndrome. Continue IV diuretics. DUNCAN on CKD4 likely CRS - improving with diuresis. When able to switch to PO - to change to torsemide 20mg BID. TTE reviewed. Cardio/Nephro recs appreciated. Discussed with patient at bedside.
73F with a PMHx of Chronic Systolic CHF (last TTE with EF 38%), moderate aortic stenosis, known LBBB, HTN, DM1, CKD4, hypothyroidism, and chronic lymphedema admitted with acute on chronic systolic CHF and DUNCAN on CKD4. Increase IV lasix to 80mg BID. Daily weights, strict Is and Os. Monitor renal indices and lytes closely. Suspect DUNCAN secondary to CRS - continue diuretics. TTE noted - f/u cards recs. Acute hypoxic respiratory failure overnight - probable LELIA - on RA this AM. Pulm consult. D/w patient at bedside.

## 2024-04-04 NOTE — PROGRESS NOTE ADULT - PROBLEM/PLAN-3
· Recurrent visits for to ER for URI and now constant Chest pain, Consult Cardiology  · Troponins NL x3  · Echocardiogram pending - patient reports SOB and VERDIN  · P r n  Morphine, nitroglycerin sublingual, and nitropaste  · Continue aspirin  · P r n   Tums  · PT and OT  · Monitor on telemetry
DISPLAY PLAN FREE TEXT

## 2024-04-04 NOTE — PROGRESS NOTE ADULT - PROBLEM SELECTOR PLAN 4
- Pt is on Lantus 37U at bedtime at home  - Recently poorly controlled (FS > 200) with Lantus 15U qHS, L-ISS  - Increase Lantus to 20U qHS, M-ISS  - Monitor fingersticks qAC&HS

## 2024-04-04 NOTE — PROGRESS NOTE ADULT - PROBLEM SELECTOR PLAN 1
- Acute on chronic systolic CHF exacerbation dt poor fluid control at home  - Clinically at baseline fluid/volume status  - Will d/c IV Lasix, resume torsemide PO at 20 mg BID, and resume home eplerenone  - C/w home Toprol XL for GDMT  - Fluid restrict 1500 mL  - TTE reviewed: EF 30%, LVH, mod-severe AS, moderate pulm HTN; decrease from EF 38% 06/2023  - LBBB on EKG is chronic and known, as per jarad pt had a left heart cath in 2022 which was benign, will c/w home ASA and statin on this admit.   - Cardio Dr. Lawrence group following - Acute on chronic systolic CHF exacerbation dt poor fluid control at home  - Clinically at baseline fluid/volume status  - Will transition Lasix 80mg IV BID to Torsemide 40 mg PO BID tomorrow  - C/w home Toprol XL for GDMT  - Fluid restrict 1500 mL  - TTE reviewed: EF 30%, LVH, mod-severe AS, moderate pulm HTN; decrease from EF 38% 06/2023  - LBBB on EKG is chronic and known, as per jarad pt had a left heart cath in 2022 which was benign, will c/w home ASA and statin on this admit.   - Cardio Dr. Lawrence group following

## 2024-04-05 NOTE — PROGRESS NOTE ADULT - REASON FOR ADMISSION
Acute on chronic systolic CHF
Assessment and Plan:     Problem List Items Addressed This Visit        Genitourinary    Adenocarcinoma of prostate [facility])   Other Visit Diagnoses     Medicare annual wellness visit, subsequent    -  Primary    Umbilical hernia without obstruction and without gangrene        Relevant Orders    Ambulatory Referral to General Surgery             Preventive health issues were discussed with patient, and age appropriate screening tests were ordered as noted in patient's After Visit Summary  Personalized health advice and appropriate referrals for health education or preventive services given if needed, as noted in patient's After Visit Summary  History of Present Illness:     Patient presents for a Medicare Wellness Visit    HPI   Patient is here for AWV  Stated that was cutting grass couple of days ago and started with sore throat, fatigue and cough  Sore throat resolved but still having cough and will take OTC and will follow back for no improvement or worsening  Counseled again to schedule follow up with urologist   Has h/o umbilical hernia repair in past and having issues again and will follow with surgeon  Will do blood work for lipid and CMP as ordered  Patient Care Team:  Arnoldo Buenrostro as PCP - General (Family Medicine)  Adam Mcclain MD as Consulting Physician (Ophthalmology)  Dino Jeffrey as Consulting Physician (Gastroenterology)  Delta Moritz, MD as Consulting Physician (Neurosurgery)     Review of Systems:     Review of Systems   Constitutional: Positive for fatigue  Negative for chills, diaphoresis, fever and unexpected weight change  HENT: Negative for congestion, dental problem, drooling, ear discharge, ear pain, facial swelling, hearing loss, mouth sores, nosebleeds, postnasal drip, rhinorrhea, sinus pressure, sinus pain, sneezing, sore throat, tinnitus, trouble swallowing and voice change  Respiratory: Positive for cough   Negative for chest tightness, shortness of breath and
Acute on chronic systolic CHF
wheezing  Cardiovascular: Negative  Gastrointestinal: Negative for abdominal pain, constipation, diarrhea, nausea and vomiting  Musculoskeletal: Negative  Skin: Negative  Neurological: Negative for dizziness, light-headedness and headaches          Problem List:     Patient Active Problem List   Diagnosis   • Actinic keratosis   • History of prostate cancer   • Anemia   • Benign essential hypertension   • Disc degeneration, lumbar   • Fatty liver disease, nonalcoholic   • Herpes simplex infection   • Mixed hyperlipidemia   • Organic impotence   • Lower extremity edema   • Groin mass   • S/P prostatectomy   • Pseudoaneurysm of carotid artery (HCC)   • Periumbilical abdominal pain   • Cervicalgia   • Cervical radiculopathy   • Adenocarcinoma of prostate [facility]      Past Medical and Surgical History:     Past Medical History:   Diagnosis Date   • Chronic rhinitis 02/05/2005    last assessed 2/5/05, resolved 5/18/17    • Elevated liver function tests     last assessed 3/9/15, resolved 5/18/17    • Herpes simplex type 1 infection    • Hip arthritis     resolved 12/20/17    • Hypertension    • Shoulder bursitis     last assessed 9/23/13, resolved 5/18/17      Past Surgical History:   Procedure Laterality Date   • HERNIA REPAIR      Inguinal sliding  10/2018   • PROSTATOTOMY      last assessed 1/11/18      Family History:     Family History   Problem Relation Age of Onset   • Hypertension Mother    • Lung cancer Family    • Prostate cancer Family    • Mental illness Neg Hx       Social History:     Social History     Socioeconomic History   • Marital status: /Civil Union     Spouse name: None   • Number of children: None   • Years of education: None   • Highest education level: None   Occupational History   • None   Tobacco Use   • Smoking status: Never   • Smokeless tobacco: Never   Vaping Use   • Vaping Use: Never used   Substance and Sexual Activity   • Alcohol use: Yes     Comment: very rare   • Drug
Acute on chronic systolic CHF
use: Never   • Sexual activity: Not Currently   Other Topics Concern   • None   Social History Narrative   • None     Social Determinants of Health     Financial Resource Strain: Low Risk    • Difficulty of Paying Living Expenses: Not hard at all   Food Insecurity: Not on file   Transportation Needs: No Transportation Needs   • Lack of Transportation (Medical): No   • Lack of Transportation (Non-Medical): No   Physical Activity: Not on file   Stress: Not on file   Social Connections: Not on file   Intimate Partner Violence: Not on file   Housing Stability: Not on file      Medications and Allergies:     Current Outpatient Medications   Medication Sig Dispense Refill   • acetaminophen (TYLENOL) 500 mg tablet Take 500 mg by mouth as needed     • ALPRAZolam (XANAX) 1 mg tablet Take 1 tablet (1 mg total) by mouth 1 (one) time for 1 dose Take 1 hour prior to cervical MRI imaging Do not start before April 20, 2023  1 tablet 0   • amLODIPine (NORVASC) 2 5 mg tablet Take 1 tablet (2 5 mg total) by mouth daily 90 tablet 1   • aspirin 81 mg chewable tablet Chew 81 mg daily     • atorvastatin (LIPITOR) 10 mg tablet TAKE 1 TABLET BY MOUTH  DAILY 90 tablet 3   • famotidine (PEPCID) 20 mg tablet Take 1 tablet (20 mg total) by mouth 2 (two) times a day 60 tablet 0   • gabapentin (NEURONTIN) 300 mg capsule Take 1 capsule (300 mg total) by mouth daily at bedtime 30 capsule 0   • methocarbamol (ROBAXIN) 500 mg tablet Take 0 5 tablets (250 mg total) by mouth 2 (two) times a day as needed for muscle spasms for up to 10 days 10 tablet 0   • Multiple Vitamin (MULTIVITAMINS PO) Take 1 capsule by mouth daily     • quinapril (ACCUPRIL) 10 mg tablet TAKE 1 TABLET BY MOUTH  EVERY 12 HOURS 180 tablet 3     No current facility-administered medications for this visit  Allergies   Allergen Reactions   • Augmentin [Amoxicillin-Pot Clavulanate] GI Intolerance   • Meperidine Other (See Comments)     Reaction Date: [id number];  Annotation -
[id number]: unsure of side effect  Reaction Date: 05Feb2005; Annotation - [id number]: unsure of side effect      Immunizations:     Immunization History   Administered Date(s) Administered   • H1N1, All Formulations 10/01/2018   • INFLUENZA 10/03/2022   • Influenza Quadrivalent Preservative Free 3 years and older IM 10/23/2014   • Influenza Quadrivalent, 6-35 Months IM 10/11/2016, 10/11/2016   • Influenza Split High Dose Preservative Free IM 10/29/2015, 09/21/2017   • Influenza, high dose seasonal 0 7 mL 10/24/2019, 09/21/2020   • Influenza, seasonal, injectable 10/25/2007, 10/13/2008, 09/09/2009, 10/04/2010, 09/28/2011, 10/25/2012, 10/29/2013   • Pneumococcal Conjugate 13-Valent 06/15/2015   • Pneumococcal Polysaccharide PPV23 07/19/2016   • Tdap 07/21/2008   • Zoster 04/15/2015   • influenza, trivalent, adjuvanted 10/24/2019, 09/21/2020      Health Maintenance:         Topic Date Due   • Colorectal Cancer Screening  10/16/2023   • Hepatitis C Screening  Completed         Topic Date Due   • COVID-19 Vaccine (1) Never done      Medicare Screening Tests and Risk Assessments: Jamin Chapman is here for his Subsequent Wellness visit  Last Medicare Wellness visit information reviewed, patient interviewed and updates made to the record today  Health Risk Assessment:   Patient rates overall health as fair  Patient feels that their physical health rating is slightly worse  Patient is satisfied with their life  Eyesight was rated as slightly worse  Hearing was rated as same  Patient feels that their emotional and mental health rating is slightly worse  Patients states they are never, rarely angry  Patient states they are always unusually tired/fatigued  Pain experienced in the last 7 days has been none  Patient states that he has experienced no weight loss or gain in last 6 months  Depression Screening:   PHQ-2 Score: 4  PHQ-9 Score: 17      Fall Risk Screening:    In the past year, patient has experienced: no history of
Acute on chronic systolic CHF
falling in past year      Home Safety:  Patient does not have trouble with stairs inside or outside of their home  Patient has working smoke alarms and has no working carbon monoxide detector  Home safety hazards include: none  Nutrition:   Current diet is Regular  Medications:   Patient is currently taking over-the-counter supplements  OTC medications include: see medication list  Patient is able to manage medications  Activities of Daily Living (ADLs)/Instrumental Activities of Daily Living (IADLs):   Walk and transfer into and out of bed and chair?: Yes  Dress and groom yourself?: Yes    Bathe or shower yourself?: Yes    Feed yourself? Yes  Do your laundry/housekeeping?: Yes  Manage your money, pay your bills and track your expenses?: Yes  Make your own meals?: Yes    Do your own shopping?: Yes    Previous Hospitalizations:   Any hospitalizations or ED visits within the last 12 months?: No      Advance Care Planning:   Living will: Yes    Durable POA for healthcare:  Yes    Advanced directive: Yes    Advanced directive counseling given: Yes    Five wishes given: Yes    Patient declined ACP directive: No      Cognitive Screening:   Provider or family/friend/caregiver concerned regarding cognition?: No    PREVENTIVE SCREENINGS      Cardiovascular Screening:    General: History Lipid Disorder, Risks and Benefits Discussed and Screening Current      Diabetes Screening:     General: Screening Current      Colorectal Cancer Screening:     General: Screening Current      Prostate Cancer Screening:    General: History Prostate Cancer and Screening Current      Osteoporosis Screening:    General: Patient Declines and Risks and Benefits Discussed      Abdominal Aortic Aneurysm (AAA) Screening:    Risk factors include: age between 73-67 yo        General: Screening Not Indicated      Lung Cancer Screening:     General: Screening Not Indicated      Hepatitis C Screening:    General: Screening Current    Screening,
"Brief Intervention, and Referral to Treatment (SBIRT)    Screening  Typical number of drinks in a day: 0  Typical number of drinks in a week: 0  Interpretation: Low risk drinking behavior  AUDIT-C Screenin) How often did you have a drink containing alcohol in the past year? never  2) How many drinks did you have on a typical day when you were drinking in the past year? 0  3) How often did you have 6 or more drinks on one occasion in the past year? never    AUDIT-C Score: 0  Interpretation: Score 0-3 (male): Negative screen for alcohol misuse    Single Item Drug Screening:  How often have you used an illegal drug (including marijuana) or a prescription medication for non-medical reasons in the past year? never    Single Item Drug Screen Score: 0  Interpretation: Negative screen for possible drug use disorder    Brief Intervention  Alcohol & drug use screenings were reviewed  No concerns regarding substance use disorder identified  Other Counseling Topics:   Car/seat belt/driving safety, skin self-exam, sunscreen and calcium and vitamin D intake and regular weightbearing exercise  No results found  Physical Exam:     /64   Pulse 82   Temp 99 °F (37 2 °C)   Resp 18   Ht 5' 11 5\" (1 816 m)   Wt 105 kg (231 lb)   BMI 31 77 kg/m²     Physical Exam  Vitals and nursing note reviewed  Constitutional:       Appearance: He is well-developed  HENT:      Head: Normocephalic and atraumatic  Right Ear: Tympanic membrane, ear canal and external ear normal       Left Ear: Tympanic membrane, ear canal and external ear normal       Nose: Mucosal edema present  Mouth/Throat:      Lips: Pink  Pharynx: No pharyngeal swelling, oropharyngeal exudate or posterior oropharyngeal erythema  Eyes:      General: Lids are normal          Right eye: No hordeolum  Left eye: No hordeolum        Conjunctiva/sclera: Conjunctivae normal    Neck:      Thyroid: No thyromegaly or thyroid "
Acute on chronic systolic CHF
tenderness  Cardiovascular:      Rate and Rhythm: Normal rate and regular rhythm  Pulses: Normal pulses  Heart sounds: No murmur heard  Pulmonary:      Effort: Pulmonary effort is normal  No respiratory distress  Breath sounds: Normal breath sounds  Chest:      Chest wall: No swelling, tenderness or edema  Abdominal:      General: Abdomen is flat  Palpations: Abdomen is soft  Tenderness: There is no abdominal tenderness  Hernia: A hernia is present  Hernia is present in the umbilical area  There is no hernia in the left inguinal area or right inguinal area  Musculoskeletal:         General: Normal range of motion  Cervical back: Full passive range of motion without pain and neck supple  Right lower leg: No edema  Left lower leg: No edema  Skin:     General: Skin is warm and dry  Neurological:      General: No focal deficit present  Mental Status: He is alert  GCS: GCS eye subscore is 4  GCS verbal subscore is 5  GCS motor subscore is 6  Sensory: Sensation is intact  Motor: Motor function is intact  Coordination: Coordination is intact  Gait: Gait is intact  Deep Tendon Reflexes: Reflexes are normal and symmetric            Glory Camp, CRNP
Acute on chronic systolic CHF

## 2024-04-05 NOTE — PROGRESS NOTE ADULT - PROVIDER SPECIALTY LIST ADULT
Cardiology
Cardiology
Nephrology
Pulmonology
Cardiology
Nephrology
Cardiology
Pulmonology
Pulmonology
Cardiology
Nephrology
Nephrology
Pulmonology
Hospitalist

## 2024-04-05 NOTE — PROGRESS NOTE ADULT - ASSESSMENT
73F with a PMHx of Chronic Systolic CHF (last TTE with EF 38%), moderate aortic stenosis, known LBBB, HTN, DM1, CKD, hypothyroidism, and chronic lymphedema presents to the ED with shortness of breath over the past two weeks which has been acutely worsening.    CHF  pulm HTN  [facility] eval -   Obesity  AS  valv heart disease  CKD  DM  Atelectasis  Lymphedema    vbg c/w [facility]  transitioned to Torsemide  vs noted -   pt did not wish for CPAP trial while in the hospital    chronic Systolic HF (EF 38%), mod AS, HTN, LBBB, Lymphedema   - Has known systolic HF, EF 38% (2023), mod AS.    diuresis in progress  [facility] rx regimen  cardio eval and follow up noted - follows with Dr Nathan in the office -   dietary discretion  monitor VS and Sat  goal sat > 88 pct  for now nocturnal o2 - VBG c/w [facility] - assess for [facility] - outpatient PSG and sleep apnea testing  weight management discussion

## 2024-04-05 NOTE — PROGRESS NOTE ADULT - NS ATTEND AMEND GEN_ALL_CORE FT
72 y/o F with Systolic HF (EF 38%), mod AS, HTN, T2DM, LBBB, CKD, and lymphedema presented to the ED c/o SOB, CURTIS, orthopnea, and edema for >2 weeks.  States, she has been compliant with her diuretic.  However, admits to be drinking fluids very judiciously (at least 3L/day).  Denies CP, palpitations, N/V/abdominal pain, fever, chills, recent travel or sick contact.     recurrent hfref  torsemide dosing had been more frequent prior to the last admission, now once daily  fluid intake has been high  cont iv lasix 80mg iV q12, increased as of 4/1.  renal function stable   Please continue to maintain strict I/Os, monitor daily weights, Cr, and K.   echo with EF 30% (known), mild-mod LVH, mildly reduced RVF, mod-severe AS, mod pHTN  will follow with you.
74 y/o F with Systolic HF (EF 38%), mod AS, HTN, T2DM, LBBB, CKD, and lymphedema presented to the ED c/o SOB, CURTIS, orthopnea, and edema for >2 weeks.  States, she has been compliant with her diuretic.  However, admits to be drinking fluids very judiciously (at least 3L/day).  Denies CP, palpitations, N/V/abdominal pain, fever, chills, recent travel or sick contact.     recurrent hfref  torsemide dosing had been more frequent prior to the last admission, now once daily  fluid intake has been high  cont iv lasix, creat responding favorably  fluid restrict  echo  no sxs acute ischemia
72 y/o F with Systolic HF (EF 38%), mod AS, HTN, T2DM, LBBB, CKD, and lymphedema presented to the ED c/o SOB, CURTIS, orthopnea, and edema for >2 weeks.  States, she has been compliant with her diuretic.  However, admits to be drinking fluids very judiciously (at least 3L/day).  Denies CP, palpitations, N/V/abdominal pain, fever, chills, recent travel or sick contact.     recurrent hfref  torsemide dosing had been more frequent prior to the last admission, now once daily  fluid intake has been high  iv lasix  fluid restrict  echo  no sxs acute ischemia  labs today remain pending.
73F with Systolic HF (EF 38%), mod AS, HTN, T1DM, LBBB, CKD, and lymphedema presented to the ED c/o SOB, CURTIS, orthopnea, and edema for >2 weeks.  States, she has been compliant with her diuretic.  However, admits to be drinking fluids very judiciously (at least 3L/day). Admitted with acute on chr CHF exacerbation.     - Has known systolic HF and mod AS.  BNP:  <--[id number]  - Repeat TTE showed EF 30%, mild-mod LVH, mildly reduced RVF, mod-severe AS (.61 cmsq), mod pHTN  - On home Torsemide 20 mg daily, Eplerenone 25 mg daily, and Toprol  mg daily  - s/p IV high dose Lasix.  Continue Torsemide but would reduce to 40 mg once daily  - Can resume home Eplerenone as outpatient  - Continue home Toprol XL  - Would start Farxiga  - Will eventually start ARNI.  Can start outpatient.    - Fluid restriction of 1.5L/day  - Continue ASA and statin
74 y/o F with Systolic HF (EF 38%), mod AS, HTN, T2DM, LBBB, CKD, and lymphedema presented to the ED c/o SOB, CURTIS, orthopnea, and edema for >2 weeks.  States, she has been compliant with her diuretic.  However, admits to be drinking fluids very judiciously (at least 3L/day).  Denies CP, palpitations, N/V/abdominal pain, fever, chills, recent travel or sick contact.     recurrent hfref  torsemide dosing had been more frequent prior to the last admission, now once daily  fluid intake has been high  incr lasix 80mg iV q12.   Please continue to maintain strict I/Os, monitor daily weights, Cr, and K.     echo  no sxs acute ischemia
72 y/o F with Systolic HF (EF 38%), mod AS, HTN, T2DM, LBBB, CKD, and lymphedema presented to the ED c/o SOB, CURTIS, orthopnea, and edema for >2 weeks.  States, she has been compliant with her diuretic.  However, admits to be drinking fluids very judiciously (at least 3L/day).  Denies CP, palpitations, N/V/abdominal pain, fever, chills, recent travel or sick contact.     recurrent hfref  torsemide dosing had been more frequent prior to the last admission, now once daily  fluid intake has been high  cont iv lasix 80mg iV q12, increased as of 4/1.  renal function stable   Please continue to maintain strict I/Os, monitor daily weights, Cr, and K.   echo with EF 30% (known), mild-mod LVH, mildly reduced RVF, mod-severe AS, mod pHTN  will follow with you
72 y/o F with Systolic HF (EF 38%), mod AS, HTN, T2DM, LBBB, CKD, and lymphedema presented to the ED c/o SOB, CURTIS, orthopnea, and edema for >2 weeks.  States, she has been compliant with her diuretic.  However, admits to be drinking fluids very judiciously (at least 3L/day).  Denies CP, palpitations, N/V/abdominal pain, fever, chills, recent travel or sick contact.     recurrent HFrEF  torsemide dosing had been more frequent prior to the last admission, now once daily  fluid intake has been high  Would continue lasix 80mg IV q12, increased as of 4/1. Creatinine stable this AM.   renal function stable   Please continue to maintain strict I/Os, monitor daily weights, Cr, and K.   Echo with EF 30% (known), mild-mod LVH, mildly reduced RVF, mod-severe AS, mod pHTN  will follow with you.

## 2024-04-05 NOTE — PROGRESS NOTE ADULT - SUBJECTIVE AND OBJECTIVE BOX
Patient is a 73y old  Female who presents with a chief complaint of Acute on chronic systolic CHF (30 Mar 2024 09:10)       HPI:  73F with a PMHx of Chronic Systolic CHF (last TTE with EF 38%), moderate aortic stenosis, known LBBB, HTN, DM2, CKD, hypothyroidism, and chronic lymphedema presents to the ED with shortness of breath over the past two weeks which has been acutely worsening. Pt states she is compliant with her medications but has not be as compliant with monitoring her fluid intake. Currently she denies chest pain, dizziness, abdominal pain, nausea, vomiting, diarrhea, fever, or chills. (29 Mar 2024 21:19)  SOB improving.  Renal consulted for DUNCAN on CKD.  Sees Dr. MCCRARY in office.  Urinating well with diuretic.        Breathing improving. NO N/V    PAST MEDICAL & SURGICAL HISTORY:  Hypertension      Diabetes      Lymphedema      H/O left bundle branch block      History of left bundle branch block (LBBB)      Systolic heart failure, chronic      H/O cataract  2020      History of surgical removal of meniscus of knee  left in 1971      Frozen shoulder  2000      H/O Achilles tendon repair  lengthened bilaterally, 2000      Fractured skull  1968           FAMILY HISTORY:  Family history of CVA  mom, age 57    [place]    Social History:Non smoker    MEDICATIONS  (STANDING):  aspirin  chewable 81 milliGRAM(s) Oral daily  atorvastatin 80 milliGRAM(s) Oral at bedtime  dextrose 5%. 1000 milliLiter(s) (100 mL/Hr) IV Continuous <Continuous>  dextrose 5%. 1000 milliLiter(s) (50 mL/Hr) IV Continuous <Continuous>  dextrose 50% Injectable 12.5 Gram(s) IV Push once  dextrose 50% Injectable 25 Gram(s) IV Push once  dextrose 50% Injectable 25 Gram(s) IV Push once  ferrous    sulfate 325 milliGRAM(s) Oral two times a day  furosemide   Injectable 80 milliGRAM(s) IV Push every 12 hours  glucagon  Injectable 1 milliGRAM(s) IntraMuscular once  heparin   Injectable 5000 Unit(s) SubCutaneous every 12 hours  insulin glargine Injectable (LANTUS) 15 Unit(s) SubCutaneous at bedtime  insulin lispro (ADMELOG) corrective regimen sliding scale   SubCutaneous three times a day before meals  levothyroxine 75 MICROGram(s) Oral daily  metoprolol succinate  milliGRAM(s) Oral daily  nystatin Powder 1 Application(s) Topical two times a day  pantoprazole    Tablet 40 milliGRAM(s) Oral before breakfast    MEDICATIONS  (PRN):  dextrose Oral Gel 15 Gram(s) Oral once PRN Blood Glucose LESS THAN 70 milliGRAM(s)/deciliter      Allergies    Ativan (Rash; Urticaria)  penicillins (Hives)    Intolerances         REVIEW OF SYSTEMS:    as above    ICU Vital Signs Last 24 Hrs  T(C): 36.4 (04 Apr 2024 12:37), Max: 36.7 (03 Apr 2024 20:41)  T(F): 97.6 (04 Apr 2024 12:37), Max: 98.1 (03 Apr 2024 20:41)  HR: 57 (04 Apr 2024 12:37) (56 - 63)  BP: 106/56 (04 Apr 2024 12:37) (106/56 - 152/66)  BP(mean): --  ABP: --  ABP(mean): --  RR: 17 (04 Apr 2024 12:37) (17 - 20)  SpO2: 93% (04 Apr 2024 12:37) (78% - 96%)    O2 Parameters below as of 04 Apr 2024 12:37  Patient On (Oxygen Delivery Method): nasal cannula  O2 Flow (L/min): 3.5          PHYSICAL EXAM:    GENERAL: NAD  HEAD:  Atraumatic, Normocephalic  EYES: EOMI, conjunctiva and sclera clear  NERVOUS SYSTEM:  Awake and Alert  CHEST/LUNG: Decreased  EXTREMITIES:  ++Edema  SKIN: No rashes No obvious ecchymosis      LABS:                                                                      11.8   6.86  )-----------( 163      ( 05 Apr 2024 05:50 )             39.8     04-05    141  |  97  |  44<H>  ----------------------------<  218<H>  4.3   |  38<H>  |  2.10<H>    Ca    8.4<L>      05 Apr 2024 05:50  Phos  3.1     04-05  Mg     2.3     04-05    TPro  7.1  /  Alb  3.1<L>  /  TBili  1.2  /  DBili  x   /  AST  15  /  ALT  11<L>  /  AlkPhos  64  04-05      Urinalysis Basic - ( 05 Apr 2024 05:50 )    Color: x / Appearance: x / SG: x / pH: x  Gluc: 218 mg/dL / Ketone: x  / Bili: x / Urobili: x   Blood: x / Protein: x / Nitrite: x   Leuk Esterase: x / RBC: x / WBC x   Sq Epi: x / Non Sq Epi: x / Bacteria: x

## 2024-04-05 NOTE — PROGRESS NOTE ADULT - ASSESSMENT
73F with Systolic HF (EF 38%), mod AS, HTN, T1DM, LBBB, CKD, and lymphedema presented to the ED c/o SOB, CURTIS, orthopnea, and edema for >2 weeks.  States, she has been compliant with her diuretic.  However, admits to be drinking fluids very judiciously (at least 3L/day). Admitted with acute on chr CHF exacerbation. Follows with Dr. Luong    Acute on chronic Systolic HF (EF 38%), mod-severe AS, HTN, LBBB, Lymphedema   - Has known systolic HF and mod AS.  BNP:  <--98740  - Repeat TTE showed EF 30%, mild-mod LVH, mildly reduced RVF, mod-severe AS (.61 cmsq), mod pHTN  - On home Torsemide 20 mg daily, Eplerenone 25 mg daily, and Toprol  mg daily  - s/p IV high dose Lasix.  Continue Torsemide but would reduce to 40 mg once daily  - Can resume home Eplerenone as outpatient  - Continue home Toprol XL  - Would start Farxiga  - Will eventually start ARNI.  Can start outpatient.  To follow up with Dr. Luong  - Fluid restriction of 1.5L/day  -  Gaol: Optimize GDMT ar BP and renal function tolerate    - Improving creatinine  - Avoid nephrotoxics.  Renal following  - Monitor for contraction alkalosis  - Strict I/O's and daily weights.  Patient has known lymphedema    - Has a known LBBB  - Had LHC in 7/2022 showing non-obstructive CAD  - Troponin: <-22  - Continue ASA and statin    - BP stable and controlled   - Monitor and replete lytes, keep K>4, Mg>2.  - Will continue to follow.    Kira Zuniga DNP, NP-C, AGACNP-C  Cardiology   Call TEAMS

## 2024-04-05 NOTE — PROGRESS NOTE ADULT - SUBJECTIVE AND OBJECTIVE BOX
Date/Time Patient Seen:  		  Referring MD:   Data Reviewed	       Patient is a 73y old  Female who presents with a chief complaint of Acute on chronic systolic CHF (04 Apr 2024 14:23)      Subjective/HPI     PAST MEDICAL & SURGICAL HISTORY:  Hypertension    Diabetes    Lymphedema    H/O left bundle branch block    History of left bundle branch block (LBBB)    Systolic heart failure, chronic    Type 1 diabetes    H/O cataract  2020    History of surgical removal of meniscus of knee  left in 1971    Frozen shoulder  2000    H/O Achilles tendon repair  lengthened bilaterally, 2000    Fractured skull  1968          Medication list         MEDICATIONS  (STANDING):  aspirin  chewable 81 milliGRAM(s) Oral daily  atorvastatin 80 milliGRAM(s) Oral at bedtime  calamine/zinc oxide Lotion 1 Application(s) Topical two times a day  dextrose 5%. 1000 milliLiter(s) (100 mL/Hr) IV Continuous <Continuous>  dextrose 5%. 1000 milliLiter(s) (50 mL/Hr) IV Continuous <Continuous>  dextrose 50% Injectable 12.5 Gram(s) IV Push once  dextrose 50% Injectable 25 Gram(s) IV Push once  dextrose 50% Injectable 25 Gram(s) IV Push once  ferrous    sulfate 325 milliGRAM(s) Oral two times a day  glucagon  Injectable 1 milliGRAM(s) IntraMuscular once  heparin   Injectable 5000 Unit(s) SubCutaneous every 12 hours  insulin glargine Injectable (LANTUS) 20 Unit(s) SubCutaneous at bedtime  insulin lispro (ADMELOG) corrective regimen sliding scale   SubCutaneous three times a day before meals  levothyroxine 75 MICROGram(s) Oral daily  metoprolol succinate  milliGRAM(s) Oral daily  nystatin Powder 1 Application(s) Topical two times a day  pantoprazole    Tablet 40 milliGRAM(s) Oral before breakfast  torsemide 40 milliGRAM(s) Oral two times a day    MEDICATIONS  (PRN):  dextrose Oral Gel 15 Gram(s) Oral once PRN Blood Glucose LESS THAN 70 milliGRAM(s)/deciliter         Vitals log        ICU Vital Signs Last 24 Hrs  T(C): 36.5 (05 Apr 2024 04:33), Max: 36.5 (04 Apr 2024 20:11)  T(F): 97.7 (05 Apr 2024 04:33), Max: 97.7 (04 Apr 2024 20:11)  HR: 56 (05 Apr 2024 04:33) (56 - 60)  BP: 111/60 (05 Apr 2024 04:33) (106/56 - 116/71)  BP(mean): --  ABP: --  ABP(mean): --  RR: 17 (05 Apr 2024 04:33) (17 - 20)  SpO2: 98% (05 Apr 2024 04:33) (78% - 98%)    O2 Parameters below as of 05 Apr 2024 04:33  Patient On (Oxygen Delivery Method): nasal cannula                 Input and Output:  I&O's Detail    03 Apr 2024 07:01  -  04 Apr 2024 07:00  --------------------------------------------------------  IN:    Oral Fluid: 480 mL  Total IN: 480 mL    OUT:    Voided (mL): 650 mL  Total OUT: 650 mL    Total NET: -170 mL      04 Apr 2024 07:01  -  05 Apr 2024 05:53  --------------------------------------------------------  IN:    Oral Fluid: 560 mL  Total IN: 560 mL    OUT:    Voided (mL): 1350 mL  Total OUT: 1350 mL    Total NET: -790 mL          Lab Data                        11.4   6.29  )-----------( 176      ( 04 Apr 2024 07:30 )             38.0     04-04    142  |  100  |  46<H>  ----------------------------<  251<H>  3.9   |  37<H>  |  2.20<H>    Ca    8.2<L>      04 Apr 2024 07:30  Phos  3.2     04-04  Mg     2.3     04-04              Review of Systems	      Objective     Physical Examination    heart s1s2  lung dc BS  head nc      Pertinent Lab findings & Imaging      Frederic:  NO   Adequate UO     I&O's Detail    03 Apr 2024 07:01  -  04 Apr 2024 07:00  --------------------------------------------------------  IN:    Oral Fluid: 480 mL  Total IN: 480 mL    OUT:    Voided (mL): 650 mL  Total OUT: 650 mL    Total NET: -170 mL      04 Apr 2024 07:01  -  05 Apr 2024 05:53  --------------------------------------------------------  IN:    Oral Fluid: 560 mL  Total IN: 560 mL    OUT:    Voided (mL): 1350 mL  Total OUT: 1350 mL    Total NET: -790 mL               Discussed with:     Cultures:	        Radiology

## 2024-04-05 NOTE — PROGRESS NOTE ADULT - ASSESSMENT
DUNCAN on CKD 4  CHF  HTN  LE Edema    -Baseline Creatinine approx 2.4  -DUNCAN likely 2/2 to CRS  -Urine lytes will be less helpful in the setting of diuretic administration  -BP well controlled  -Creatinine stable at baseline range; stable lytes  -Creatinine tolerating diuretics.  Changed to oral    No renal objection to DC

## 2024-04-05 NOTE — CASE MANAGEMENT PROGRESS NOTE - NSCMPROGRESSNOTE_GEN_ALL_CORE
Per MD, patient is medically cleared for discharge home today. CM met with the patient at the bedside to discuss transition planning. Home oxygen has been approved and portable oxygen has been delivered to the patient at the bedside by [facility] representative. CM again tried to encourage home care visiting nurse/PT. Patient declined again and stated she does not want the service. Patient stated she has a follow up appointment on 4/12/24 with Cardiologist Dr. Luong. CMS STAR program reinforced-patient is receptive. Patient's partner will transport the patient home today; declines for CM to arrange transportation. Declined for CM to contact her partner and stated she just spoke to her on the phone and she is aware of discharge plan. Patient verbalized understanding of the transition plan and is in agreement. IMM explained and provided to the patient/patient signed. CM made contact with EDEN Rhoades at [facility] [phone] and made aware that the patient is declining home care but receptive to [facility] program. Per Idalia she will follow up with the patient and advise CANDIE Ontiveros. Bedside RN aware of plan. CM remains available.

## 2024-04-05 NOTE — PROGRESS NOTE ADULT - SUBJECTIVE AND OBJECTIVE BOX
[facility] -- Howard Kimble, Carlos Luong Savella, , Edgar Sutton  Office # [phone]    Follow Up:  ADHF, VHD    Subjective/Observations:     REVIEW OF SYSTEMS: All other review of systems is negative unless indicated above  PAST MEDICAL & SURGICAL HISTORY:  Hypertension  Diabetes  Lymphedema  H/O left bundle branch block  History of left bundle branch block (LBBB)  Systolic heart failure, chronic  Type 1 diabetes  H/O cataract  2020  History of surgical removal of meniscus of knee  left in 1971  Frozen shoulder  2000  H/O Achilles tendon repair  lengthened bilaterally, 2000  Fractured skull  1968    MEDICATIONS  (STANDING):  aspirin  chewable 81 milliGRAM(s) Oral daily  atorvastatin 80 milliGRAM(s) Oral at bedtime  calamine/zinc oxide Lotion 1 Application(s) Topical two times a day  dextrose 5%. 1000 milliLiter(s) (100 mL/Hr) IV Continuous <Continuous>  dextrose 5%. 1000 milliLiter(s) (50 mL/Hr) IV Continuous <Continuous>  dextrose 50% Injectable 12.5 Gram(s) IV Push once  dextrose 50% Injectable 25 Gram(s) IV Push once  dextrose 50% Injectable 25 Gram(s) IV Push once  ferrous    sulfate 325 milliGRAM(s) Oral two times a day  glucagon  Injectable 1 milliGRAM(s) IntraMuscular once  heparin   Injectable 5000 Unit(s) SubCutaneous every 12 hours  insulin glargine Injectable (LANTUS) 20 Unit(s) SubCutaneous at bedtime  insulin lispro (ADMELOG) corrective regimen sliding scale   SubCutaneous three times a day before meals  levothyroxine 75 MICROGram(s) Oral daily  metoprolol succinate  milliGRAM(s) Oral daily  nystatin Powder 1 Application(s) Topical two times a day  pantoprazole    Tablet 40 milliGRAM(s) Oral before breakfast  torsemide 40 milliGRAM(s) Oral two times a day    MEDICATIONS  (PRN):  dextrose Oral Gel 15 Gram(s) Oral once PRN Blood Glucose LESS THAN 70 milliGRAM(s)/deciliter    Allergies    Ativan (Rash; Urticaria)  penicillins (Hives)    Intolerances      Vital Signs Last 24 Hrs  T(C): 36.5 (05 Apr 2024 04:33), Max: 36.5 (04 Apr 2024 20:11)  T(F): 97.7 (05 Apr 2024 04:33), Max: 97.7 (04 Apr 2024 20:11)  HR: 56 (05 Apr 2024 04:33) (56 - 60)  BP: 111/60 (05 Apr 2024 04:33) (106/56 - 116/71)  BP(mean): --  RR: 17 (05 Apr 2024 04:33) (17 - 20)  SpO2: 98% (05 Apr 2024 04:33) (78% - 98%)    Parameters below as of 05 Apr 2024 04:33  Patient On (Oxygen Delivery Method): nasal cannula      I&O's Summary    04 Apr 2024 07:01  -  05 Apr 2024 07:00  --------------------------------------------------------  IN: 560 mL / OUT: 1350 mL / NET: -790 mL        PHYSICAL EXAM:  TELE:   Constitutional: NAD, awake and alert, well-developed  HEENT: Moist Mucous Membranes, Anicteric  Pulmonary: Non-labored, breath sounds are clear bilaterally, No wheezing, rales or rhonchi  Cardiovascular: Regular, S1 and S2, No murmurs, rubs, gallops or clicks  Gastrointestinal: Bowel Sounds present, soft, nontender.   Lymph: No peripheral edema. No lymphadenopathy.  Skin: No visible rashes or ulcers.  Psych:  Mood & affect appropriate  LABS: All Labs Reviewed:                        11.8   6.86  )-----------( 163      ( 05 Apr 2024 05:50 )             39.8                         11.4   6.29  )-----------( 176      ( 04 Apr 2024 07:30 )             38.0                         11.2   5.76  )-----------( 162      ( 03 Apr 2024 06:30 )             36.9     05 Apr 2024 05:50    141    |  97     |  44     ----------------------------<  218    4.3     |  38     |  2.10   04 Apr 2024 07:30    142    |  100    |  46     ----------------------------<  251    3.9     |  37     |  2.20   03 Apr 2024 06:30    142    |  101    |  46     ----------------------------<  240    3.8     |  34     |  2.20     Ca    8.4        05 Apr 2024 05:50  Ca    8.2        04 Apr 2024 07:30  Ca    8.1        03 Apr 2024 06:30  Phos  3.1       05 Apr 2024 05:50  Phos  3.2       04 Apr 2024 07:30  Phos  3.3       03 Apr 2024 06:30  Mg     2.3       05 Apr 2024 05:50  Mg     2.3       04 Apr 2024 07:30  Mg     2.3       03 Apr 2024 06:30    TPro  7.1    /  Alb  3.1    /  TBili  1.2    /  DBili  x      /  AST  15     /  ALT  11     /  AlkPhos  64     05 Apr 2024 05:50          12 Lead ECG:   Ventricular Rate 57 BPM    Atrial Rate 57 BPM    P-R Interval 312 ms    QRS Duration 168 ms    Q-T Interval 520 ms    QTC Calculation(Bazett) 506 ms    P Axis 38 degrees    R Axis 212 degrees    T Axis -31 degrees    Diagnosis Line Sinus bradycardia with 1st degree AV block  Right superior axis deviation  Nonspecific intraventricular block  Abnormal ECG  When compared with ECG of 09-JAN-2024 09:46,  Nonspecific T wave abnormality now evident in Inferior leads  Nonspecific T wave abnormality, improved in Lateral leads  Confirmed by Dale Lawrence MD ([id number]) on 3/31/2024 2:43:25 PM (03-29-24 @ 17:08)      TRANSTHORACIC ECHOCARDIOGRAM REPORT  ________________________________________________________________________________                                      _______       Pt. Name:       EVELYN LOPEZ Study Date:    4/1/2024  MRN:            [id number]         YOB: 1950  Accession #:    [id number]        Age:           73 years  Account#:       [id number]       Gender:        F  Heart Rate:                      Height:        71.00 in (180.34 cm)  Rhythm:                          Weight:        330.69 lb (150.00 kg)  Blood Pressure: 116/68 mmHg      BSA/BMI:       2.61 m² / 46.12 kg/m²  ________________________________________________________________________________________  Referring Physician:    [id number] Luis Enrique Luo  Interpreting Physician: Pia Sutton MD  Primary Sonographer:    Marlon Chen    CPT:               ECHO TTE WO CON COMP W DOPP - 72051.m  Indication(s):     Dyspnea, unspecified - R06.00  Procedure:         Transthoracic echocardiogram with 2-D, M-mode and complete                     spectral and color flow Doppler.  Ordering Location: [facility]  Admission Status:  Inpatient  Study Information: Image quality for this study is technically difficult.    _______________________________________________________________________________________     CONCLUSIONS:      1. Technically difficult image quality.   2. Left ventricular systolic function is moderately to severely decreased with an ejection fraction of 30 % by Nunes's method of disks.   3. Mildto moderate left ventricular hypertrophy.   4. The right ventricle is not well visualized. Based on visual assessment, the right ventricle appears mildly enlarged. mildly reduced systolic function.   5. The left atrium is mildly dilated.   6. Trace aortic regurgitation.   7. Mild mitral regurgitation.   8. Moderate to severe aortic stenosis. There is a Vmax of 3.66m/s, Mean gradient of 26mmHg and a DI of 0.19. The JUAN DANIEL is calculated as 0.61cm2 by continuity. This likely represents low flow low gradient AS.   9. Estimated pulmonary artery systolic pressure is 59 mmHg, consistent with moderate pulmonary hypertension.  10. The inferior vena cava is dilated measuring 2.60 cm in diameter, (dilated >2.1cm) with normal inspiratory collapse (normal >50%) consistent with mildly elevated right atrial pressure (~8, range 5-10mmHg).    ________________________________________________________________________________________  FINDINGS:     Left Ventricle:  Left ventricular systolic function is moderately to severely decreased with a calculated ejection fraction of 30 % by the Nunes's biplane method of disks. Mild to moderate left ventricular hypertrophy.     Right Ventricle:  The right ventricle is not well visualized. Based on visual assessment, the right ventricle appears mildly enlarged. The right ventricular cavity is mildly enlarged in size and mildly reduced systolic function. Tricuspid annular plane systolic excursion (TAPSE) is 2.4 cm (normal >=1.7 cm).     Left Atrium:  The left atrium is mildly dilated with an indexed volume of 34.20 ml/m².     Right Atrium:  The right atrium is normal in size.     Aortic Valve:  There is calcification of the aortic valve leaflets. There is moderate to severe aortic stenosis. The peak transaorticvelocity is 3.66 m/s, peak transaortic gradient is 53.6 mmHg and mean transaortic gradient is 26.0 mmHg with an LVOT/aortic valve VTI ratio of 0.19. The aortic valve area is estimated at 0.61 cm² by the continuity equation and 1.16 cm² by 2D planimetry. There is trace aortic regurgitation.     Mitral Valve:  There is mild calcification of the mitral valve annulus. Mitral valve leaflets are diffusely calcified. There is mild mitral regurgitation.     Tricuspid Valve:  There is mild tricuspid regurgitation. Estimated pulmonary artery systolic pressure is 59 mmHg, consistent with moderate pulmonary hypertension.     Pulmonic Valve:  The pulmonic valve was not well visualized. There is trace pulmonic regurgitation.     Aorta:  The aortic root at the sinuses of Valsalva is normal in size, measuring 2.90 cm (indexed 1.11 cm/m²).     Systemic Veins:  The inferior vena cava is dilated measuring 2.60 cm in diameter, (dilated >2.1cm) with normal inspiratory collapse (normal >50%) consistent with mildly elevated right atrial pressure (~8, range 5-10mmHg).  ____________________________________________________________________  QUANTITATIVE DATA:  Left Ventricle Measurements: (Indexed to BSA)     IVSd (2D):   1.3 cm  LVPWd (2D):  1.2 cm  LVIDd (2D):  5.4 cm  LVIDs (2D):  4.7 cm  LV Mass:     284 g  108.7 g/m²  LV Vol d, MOD A2C: 138.0 ml 52.85 ml/m²  LV Vol d, MOD A4C: 114.0 ml 43.65 ml/m²  LV Vol d, MOD BP:  128.3 ml 49.14 ml/m²  LV Vol s, MOD A2C: 86.9 ml  33.28 ml/m²  LV Vol s, MOD A4C: 88.5ml  33.89 ml/m²  LV Vol s, MOD BP:  89.5 ml  34.28 ml/m²  LVOT SV MOD BP:    38.8 ml  LV EF% MOD BP:     30 %     MV E Vmax:    0.75 m/s  MV A Vmax:    0.97 m/s  MV E/A:       0.77  e' lateral:   8.16 cm/s  e' medial:    3.59 cm/s  E/e' lateral: 9.18  E/e' medial:  20.86  E/e' Average: 12.75  MV DT:        268 msec    Aorta Measurements: (Normal range) (Indexed to BSA)     Sinuses of Valsalva: 2.90 cm (2.7 - 3.3 cm)    Left Atrium Measurements: (Indexed to BSA)  LA Diam 2D: 4.90 cm    Right Ventricle Measurements:     TAPSE:            2.4 cm  RV Base (RVID1):  4.9 cm  RV Mid (RVID2):   4.1 cm  RV Major (RVID3): 8.3 cm       LVOT / RVOT/ Qp/Qs Data: (Indexed to BSA)  LVOT Diameter: 2.00 cm  LVOT Vmax:     0.67 m/s  LVOT VTI:      19.00 cm  LVOT SV:       59.7 ml  22.86 ml/m²    Aortic Valve Measurements:  AV Vmax:                3.7 m/s  AV Peak Gradient:       53.6 mmHg  AV Mean Gradient:       26.0 mmHg  AV VTI:                 97.8 cm  AV VTI Ratio:           0.19  AoV EOA, Contin:      0.61 cm²  AoV EOA, Contin i:      0.23 cm²/m²  AoV area, 2D planim:    1.16 cm²  AoV Dimensionless Index 0.19    Mitral Valve Measurements:     MV E Vmax: 0.7 m/s  MV A Vmax: 1.0 m/s  MV E/A:    0.8    Tricuspid Valve Measurements:     TR Vmax:          3.6 m/s  TR Peak Gradient: 51.3 mmHg  RA Pressure:      8 mmHg  PASP:             59 mmHg  ________________________________________________________________________________________  Electronically signed on 4/1/2024 at 2:34:33 PM by Pia Sutton MD     *** Final ***      [facility] -- Howard Kimble, Carlos Luong Savella, , Edgar Sutton  Office # [phone]    Follow Up:  ADHF, VHD    Subjective/Observations: Awake and alert, still on NC at 3L/min.  Denies SOB.  Admits to have improved edema.  Denies CP or palpitations.      REVIEW OF SYSTEMS: All other review of systems is negative unless indicated above  PAST MEDICAL & SURGICAL HISTORY:  Hypertension  Diabetes  Lymphedema  H/O left bundle branch block  History of left bundle branch block (LBBB)  Systolic heart failure, chronic  Type 1 diabetes  H/O cataract  2020  History of surgical removal of meniscus of knee  left in 1971  Frozen shoulder  2000  H/O Achilles tendon repair  lengthened bilaterally, 2000  Fractured skull  1968    MEDICATIONS  (STANDING):  aspirin  chewable 81 milliGRAM(s) Oral daily  atorvastatin 80 milliGRAM(s) Oral at bedtime  calamine/zinc oxide Lotion 1 Application(s) Topical two times a day  dextrose 5%. 1000 milliLiter(s) (100 mL/Hr) IV Continuous <Continuous>  dextrose 5%. 1000 milliLiter(s) (50 mL/Hr) IV Continuous <Continuous>  dextrose 50% Injectable 12.5 Gram(s) IV Push once  dextrose 50% Injectable 25 Gram(s) IV Push once  dextrose 50% Injectable 25 Gram(s) IV Push once  ferrous    sulfate 325 milliGRAM(s) Oral two times a day  glucagon  Injectable 1 milliGRAM(s) IntraMuscular once  heparin   Injectable 5000 Unit(s) SubCutaneous every 12 hours  insulin glargine Injectable (LANTUS) 20 Unit(s) SubCutaneous at bedtime  insulin lispro (ADMELOG) corrective regimen sliding scale   SubCutaneous three times a day before meals  levothyroxine 75 MICROGram(s) Oral daily  metoprolol succinate  milliGRAM(s) Oral daily  nystatin Powder 1 Application(s) Topical two times a day  pantoprazole    Tablet 40 milliGRAM(s) Oral before breakfast  torsemide 40 milliGRAM(s) Oral two times a day    MEDICATIONS  (PRN):  dextrose Oral Gel 15 Gram(s) Oral once PRN Blood Glucose LESS THAN 70 milliGRAM(s)/deciliter    Allergies    Ativan (Rash; Urticaria)  penicillins (Hives)    Intolerances    Vital Signs Last 24 Hrs  T(C): 36.5 (05 Apr 2024 04:33), Max: 36.5 (04 Apr 2024 20:11)  T(F): 97.7 (05 Apr 2024 04:33), Max: 97.7 (04 Apr 2024 20:11)  HR: 56 (05 Apr 2024 04:33) (56 - 60)  BP: 111/60 (05 Apr 2024 04:33) (106/56 - 116/71)  BP(mean): --  RR: 17 (05 Apr 2024 04:33) (17 - 20)  SpO2: 98% (05 Apr 2024 04:33) (78% - 98%)    Parameters below as of 05 Apr 2024 04:33  Patient On (Oxygen Delivery Method): nasal cannula    I&O's Summary    04 Apr 2024 07:01  -  05 Apr 2024 07:00  --------------------------------------------------------  IN: 560 mL / OUT: 1350 mL / NET: -790 mL    PHYSICAL EXAM:  TELE: Not on tele  Constitutional: NAD, awake and alert  HEENT: Moist Mucous Membranes, Anicteric  Pulmonary: Non-labored, breath sounds are clear but diminished bilaterally, No wheezing, rales or rhonchi  Cardiovascular: Regular, S1 and S2, +murmurs, no rubs, gallops or clicks  Gastrointestinal: Bowel Sounds present, soft, nontender.   Lymph: 2+ edema. No lymphadenopathy.  Skin: +rashes from eczema, no ulcers.  Psych:  Mood & affect appropriate  LABS: All Labs Reviewed:                        11.8   6.86  )-----------( 163      ( 05 Apr 2024 05:50 )             39.8                         11.4   6.29  )-----------( 176      ( 04 Apr 2024 07:30 )             38.0                         11.2   5.76  )-----------( 162      ( 03 Apr 2024 06:30 )             36.9     05 Apr 2024 05:50    141    |  97     |  44     ----------------------------<  218    4.3     |  38     |  2.10   04 Apr 2024 07:30    142    |  100    |  46     ----------------------------<  251    3.9     |  37     |  2.20   03 Apr 2024 06:30    142    |  101    |  46     ----------------------------<  240    3.8     |  34     |  2.20     Ca    8.4        05 Apr 2024 05:50  Ca    8.2        04 Apr 2024 07:30  Ca    8.1        03 Apr 2024 06:30  Phos  3.1       05 Apr 2024 05:50  Phos  3.2       04 Apr 2024 07:30  Phos  3.3       03 Apr 2024 06:30  Mg     2.3       05 Apr 2024 05:50  Mg     2.3       04 Apr 2024 07:30  Mg     2.3       03 Apr 2024 06:30    TPro  7.1    /  Alb  3.1    /  TBili  1.2    /  DBili  x      /  AST  15     /  ALT  11     /  AlkPhos  64     05 Apr 2024 05:50          12 Lead ECG:   Ventricular Rate 57 BPM    Atrial Rate 57 BPM    P-R Interval 312 ms    QRS Duration 168 ms    Q-T Interval 520 ms    QTC Calculation(Bazett) 506 ms    P Axis 38 degrees    R Axis 212 degrees    T Axis -31 degrees    Diagnosis Line Sinus bradycardia with 1st degree AV block  Right superior axis deviation  Nonspecific intraventricular block  Abnormal ECG  When compared with ECG of 09-JAN-2024 09:46,  Nonspecific T wave abnormality now evident in Inferior leads  Nonspecific T wave abnormality, improved in Lateral leads  Confirmed by Dale Lawrence MD ([id number]) on 3/31/2024 2:43:25 PM (03-29-24 @ 17:08)      TRANSTHORACIC ECHOCARDIOGRAM REPORT  ________________________________________________________________________________                                      _______       Pt. Name:       EVELYN LOPEZ Study Date:    4/1/2024  MRN:            [id number]         YOB: 1950  Accession #:    [id number]        Age:           73 years  Account#:       [id number]       Gender:        F  Heart Rate:                      Height:        71.00 in (180.34 cm)  Rhythm:                          Weight:        330.69 lb (150.00 kg)  Blood Pressure: 116/68 mmHg      BSA/BMI:       2.61 m² / 46.12 kg/m²  ________________________________________________________________________________________  Referring Physician:    [id number] Luis Enrique Luo  Interpreting Physician: Pia Sutton MD  Primary Sonographer:    Marlon Chen    CPT:               ECHO TTE WO CON COMP W DOPP - 49304.m  Indication(s):     Dyspnea, unspecified - R06.00  Procedure:         Transthoracic echocardiogram with 2-D, M-mode and complete                     spectral and color flow Doppler.  Ordering Location: [facility]  Admission Status:  Inpatient  Study Information: Image quality for this study is technically difficult.    _______________________________________________________________________________________     CONCLUSIONS:      1. Technically difficult image quality.   2. Left ventricular systolic function is moderately to severely decreased with an ejection fraction of 30 % by Nunes's method of disks.   3. Mildto moderate left ventricular hypertrophy.   4. The right ventricle is not well visualized. Based on visual assessment, the right ventricle appears mildly enlarged. mildly reduced systolic function.   5. The left atrium is mildly dilated.   6. Trace aortic regurgitation.   7. Mild mitral regurgitation.   8. Moderate to severe aortic stenosis. There is a Vmax of 3.66m/s, Mean gradient of 26mmHg and a DI of 0.19. The JUAN DANIEL is calculated as 0.61cm2 by continuity. This likely represents low flow low gradient AS.   9. Estimated pulmonary artery systolic pressure is 59 mmHg, consistent with moderate pulmonary hypertension.  10. The inferior vena cava is dilated measuring 2.60 cm in diameter, (dilated >2.1cm) with normal inspiratory collapse (normal >50%) consistent with mildly elevated right atrial pressure (~8, range 5-10mmHg).    ________________________________________________________________________________________  FINDINGS:     Left Ventricle:  Left ventricular systolic function is moderately to severely decreased with a calculated ejection fraction of 30 % by the Nunes's biplane method of disks. Mild to moderate left ventricular hypertrophy.     Right Ventricle:  The right ventricle is not well visualized. Based on visual assessment, the right ventricle appears mildly enlarged. The right ventricular cavity is mildly enlarged in size and mildly reduced systolic function. Tricuspid annular plane systolic excursion (TAPSE) is 2.4 cm (normal >=1.7 cm).     Left Atrium:  The left atrium is mildly dilated with an indexed volume of 34.20 ml/m².     Right Atrium:  The right atrium is normal in size.     Aortic Valve:  There is calcification of the aortic valve leaflets. There is moderate to severe aortic stenosis. The peak transaorticvelocity is 3.66 m/s, peak transaortic gradient is 53.6 mmHg and mean transaortic gradient is 26.0 mmHg with an LVOT/aortic valve VTI ratio of 0.19. The aortic valve area is estimated at 0.61 cm² by the continuity equation and 1.16 cm² by 2D planimetry. There is trace aortic regurgitation.     Mitral Valve:  There is mild calcification of the mitral valve annulus. Mitral valve leaflets are diffusely calcified. There is mild mitral regurgitation.     Tricuspid Valve:  There is mild tricuspid regurgitation. Estimated pulmonary artery systolic pressure is 59 mmHg, consistent with moderate pulmonary hypertension.     Pulmonic Valve:  The pulmonic valve was not well visualized. There is trace pulmonic regurgitation.     Aorta:  The aortic root at the sinuses of Valsalva is normal in size, measuring 2.90 cm (indexed 1.11 cm/m²).     Systemic Veins:  The inferior vena cava is dilated measuring 2.60 cm in diameter, (dilated >2.1cm) with normal inspiratory collapse (normal >50%) consistent with mildly elevated right atrial pressure (~8, range 5-10mmHg).  ____________________________________________________________________  QUANTITATIVE DATA:  Left Ventricle Measurements: (Indexed to BSA)     IVSd (2D):   1.3 cm  LVPWd (2D):  1.2 cm  LVIDd (2D):  5.4 cm  LVIDs (2D):  4.7 cm  LV Mass:     284 g  108.7 g/m²  LV Vol d, MOD A2C: 138.0 ml 52.85 ml/m²  LV Vol d, MOD A4C: 114.0 ml 43.65 ml/m²  LV Vol d, MOD BP:  128.3 ml 49.14 ml/m²  LV Vol s, MOD A2C: 86.9 ml  33.28 ml/m²  LV Vol s, MOD A4C: 88.5ml  33.89 ml/m²  LV Vol s, MOD BP:  89.5 ml  34.28 ml/m²  LVOT SV MOD BP:    38.8 ml  LV EF% MOD BP:     30 %     MV E Vmax:    0.75 m/s  MV A Vmax:    0.97 m/s  MV E/A:       0.77  e' lateral:   8.16 cm/s  e' medial:    3.59 cm/s  E/e' lateral: 9.18  E/e' medial:  20.86  E/e' Average: 12.75  MV DT:        268 msec    Aorta Measurements: (Normal range) (Indexed to BSA)     Sinuses of Valsalva: 2.90 cm (2.7 - 3.3 cm)    Left Atrium Measurements: (Indexed to BSA)  LA Diam 2D: 4.90 cm    Right Ventricle Measurements:     TAPSE:            2.4 cm  RV Base (RVID1):  4.9 cm  RV Mid (RVID2):   4.1 cm  RV Major (RVID3): 8.3 cm       LVOT / RVOT/ Qp/Qs Data: (Indexed to BSA)  LVOT Diameter: 2.00 cm  LVOT Vmax:     0.67 m/s  LVOT VTI:      19.00 cm  LVOT SV:       59.7 ml  22.86 ml/m²    Aortic Valve Measurements:  AV Vmax:                3.7 m/s  AV Peak Gradient:       53.6 mmHg  AV Mean Gradient:       26.0 mmHg  AV VTI:                 97.8 cm  AV VTI Ratio:           0.19  AoV EOA, Contin:      0.61 cm²  AoV EOA, Contin i:      0.23 cm²/m²  AoV area, 2D planim:    1.16 cm²  AoV Dimensionless Index 0.19    Mitral Valve Measurements:     MV E Vmax: 0.7 m/s  MV A Vmax: 1.0 m/s  MV E/A:    0.8    Tricuspid Valve Measurements:     TR Vmax:          3.6 m/s  TR Peak Gradient: 51.3 mmHg  RA Pressure:      8 mmHg  PASP:             59 mmHg  ________________________________________________________________________________________  Electronically signed on 4/1/2024 at 2:34:33 PM by Pia Sutton MD     *** Final ***

## 2024-04-09 PROBLEM — E10.9 TYPE 1 DIABETES MELLITUS WITHOUT COMPLICATIONS: Chronic | Status: ACTIVE | Noted: 2024-01-01

## 2024-04-12 PROBLEM — I50.9 CHF (CONGESTIVE HEART FAILURE): Status: ACTIVE | Noted: 2022-04-12

## 2024-04-12 PROBLEM — I50.20 HFREF (HEART FAILURE WITH REDUCED EJECTION FRACTION): Status: ACTIVE | Noted: 2024-01-01

## 2024-04-12 PROBLEM — I35.0 AORTIC VALVULAR STENOSIS: Status: ACTIVE | Noted: 2024-01-01

## 2024-04-12 PROBLEM — R40.20 LOC (LOSS OF CONSCIOUSNESS): Status: ACTIVE | Noted: 2024-01-01

## 2024-04-12 PROBLEM — I27.20 PULMONARY HYPERTENSION: Status: ACTIVE | Noted: 2024-01-01

## 2024-04-12 PROBLEM — I44.7 LBBB (LEFT BUNDLE BRANCH BLOCK): Status: ACTIVE | Noted: 2022-04-12

## 2024-04-12 NOTE — PATIENT PROFILE ADULT - FALL HARM RISK - HARM RISK INTERVENTIONS

## 2024-04-12 NOTE — CARDIOLOGY SUMMARY
[de-identified] : sinus bradycardia  First degree AVB.  LBBB [de-identified] : 4/2024 LVEF 30% mild-mod LVH mild MR RVE with decreased RV function. mod -severe AS, peak gradient 53, mean 26, JUAN DANIEL 0.6 PASP 59 [de-identified] : 7/2022 non obs

## 2024-04-12 NOTE — H&P ADULT - ASSESSMENT
73F w HFrEF (EF 30%), mod AS, known LBBB, HTN, IDDM1, CKD#, hypothyroidism, chronic lymphedema, p/w 1 episode of syncope.        73F w HFrEF (EF 30%), mod AS, known LBBB, HTN, IDDM1, CKD, hypothyroidism, chronic lymphedema, p/w 1 episode of syncope with R arm jerking.

## 2024-04-12 NOTE — H&P ADULT - NSHPSOCIALHISTORY_GEN_ALL_CORE
Ambulates independently, independent in all ADL IALDs Ambulates with walker, uses wheelchair  Lives with partner  Needs assistance with iADL Ambulates with walker, uses wheelchair  Lives with partner

## 2024-04-12 NOTE — H&P ADULT - NSHPREVIEWOFSYSTEMS_GEN_ALL_CORE
CONSTITUTIONAL: No weakness, fevers or chills  EYES/ENT: No visual changes;  No vertigo or throat pain   NECK: No pain or stiffness  RESPIRATORY: No cough, wheezing, hemoptysis; No shortness of breath  CARDIOVASCULAR: No chest pain or palpitations  GASTROINTESTINAL: No abdominal or epigastric pain. No nausea, vomiting, or hematemesis; No diarrhea or constipation. No melena or hematochezia.  GENITOURINARY: No dysuria, frequency or hematuria  NEUROLOGICAL: No numbness or weakness  SKIN: No itching, rashes  MUSCULOSKELETAL: No joint pain, muscle pain. CONSTITUTIONAL: No weakness, fevers or chills  EYES/ENT: No visual changes;  No vertigo or throat pain   NECK: No pain or stiffness  RESPIRATORY: No cough, wheezing, hemoptysis; +SOB  CARDIOVASCULAR: No chest pain or palpitations, +chronic lymphedema  GASTROINTESTINAL: No abdominal or epigastric pain. No nausea, vomiting, or hematemesis; No diarrhea or constipation. No melena or hematochezia.  GENITOURINARY: No dysuria, frequency or hematuria  NEUROLOGICAL: +Syncope, +arm jerking  SKIN: +psoriatic rashes  MUSCULOSKELETAL: No joint pain, muscle pain.

## 2024-04-12 NOTE — H&P ADULT - PROBLEM SELECTOR PLAN 3
Unclear baseline SCr...    - f/u urine studies  - trend BUN/SCr, avoid nephrotoxic, renally dose all meds  - strict I/Os Unclear baseline SCr likelty 2.1-2.2    - f/u urine studies  - trend BUN/SCr, avoid nephrotoxic, renally dose all meds  - strict I/Os

## 2024-04-12 NOTE — ED PROVIDER NOTE - ATTENDING CONTRIBUTION TO CARE
Attending MD Castano: I personally have seen and examined this patient.  Resident note reviewed and agree on plan of care and except where noted.  See below for details.     Seen in [facility], accompanied by partner    73F with PMH/PSH including CHF (last TTE with EF 30%), mod AS, L BBB, HTN, DM1, CKD, hypothyroidism, and chronic lymphedema presents to the ED s/p syncope.  Reports had shortness of breath walking to car, but has been having increasing shortness of breath with exertion.  Reports once she got in car, was breathing heavily.  Partner then noted patient was unresponsive to her name, partner reports got concerned enough that she felt for a pulse at side of neck when patient finally came to.  Patient denies chest pain, shortness of breath, abdominal pain, nausea, vomiting, diarrhea, urinary complaints.  Reports bilateral LE edema, R>L, reports legs appear better than usual.  Reports compliant with Torsemide 40mg daily.  Denies abdominal pain, nausea, vomiting, diarrhea, urinary complaints, fevers.    Exam:   General: NAD  HENT: head NCAT, airway patent  Eyes: anicteric, no conjunctival injection   Lungs: lungs crackles at bases, with good inspiratory effort, no wheezing, no rhonchi  Cardiac: +S1S2, no obvious r/g, +murmur  GI: abdomen soft with +BS, NT, ND  : no CVAT  MSK: ranging neck and extremities freely, bilateral LE edema, R>L  Neuro: moving all extremities spontaneously, nonfocal  Psych: normal mood and affect     A/P: 73F with shortness of breath, ?syncope, will evaluate for but not limited to CHF exacerbation, worsening AS, PNA, viral URI, ACS, will obtain labs, CXR, keep on monitor, will admit

## 2024-04-12 NOTE — ED PROVIDER NOTE - OBJECTIVE STATEMENT
73F w/ hx Chronic Systolic CHF (last TTE with EF 30%), moderate aortic stenosis, known LBBB, HTN, DM1, CKD, hypothyroidism, and chronic lymphedema presenting with syncope. Pt had sob walking to car. Once in car, she was still breathing heavily. Partner noticed pt was not responding to verbal stimuli for 10-15sec. Pt came back to quickly. Pt went to Cardiologist Dr. Nathan who recommended pt to come to ED. No fever, cp, abd pain, n/v. Leg swelling is improved from prior. Pt on torsemide 40mg which she has been taking. Pt was discharged on 3LNC which she is currently on.

## 2024-04-12 NOTE — HISTORY OF PRESENT ILLNESS
[FreeTextEntry1] : This is a 73 year old woman with a history of obesity, diabetes (type 1-diagnosed in 20s), hypertension, hyperlipidemia, and chronic lymphedema who presents to the office for a follow up visit.  IN the spring of 2022 she was admitted to  with increased dyspnea and decompensated heart failure. She was diuresed with IV Lasix.  She had an echocardiogram that was a technically difficult study that showed an EF of 35-40%.  Review of old records shows that this is new.  She had a cath at at [facility] in 2013 that was normal.  I sent her for repeat cath in July of 2022, and this showed non obstructive CAD.   In the summer of 2023 she was admitted to  with urosepsis.  She required IV pressor support, and developed DUNCAN and needed a few sessions of HD.  She has now recovered.  Her HD has been stopped.  She reports that torsemide was decreased to QD dosing.  She has not noticed a significant change in her LE edema.  Her CURTIS is at baseline.   NO palpitations, dizziness, or syncope.  She is taking all of her medications as prescribed.  Her repeat echocardiogram showed moderate AS and segmental wall motion abnormalities with LAD distribution hypokinesis.  Again, cath was unrevealing.   She arrives today after hospitalization at [facility] from 3/29-4/5/24: She presented to the ED with shortness of breath which had been acutely worsening. Pt states she is compliant with her medications but has not be as compliant with monitoring her fluid intake.  HOSPITAL COURSE: Cardiology consulted and pt started on IV lasix and fluid restriction. Nephrology consulted given DUNCAN on CKD with need for continuous diuretics. TTE reveal LVEF 30% with mild to moderate LVH, trace AR, moderate to severe AS (JUAN DANIEL 0.6), moderate pulmonary htn, dilated IVC. This is a decline from previous TTE with 38% LVEF; moderate AS. Failed weaning off supplemental oxygen, with SpO2 78% on RA. discharged on portable O2 and advised LELIA workup with pulmonology. Eplerenone was d/c's due to DUNCAN.  Farxiga prescribed for HF optimization, however pt is type 1 diabetic.  she arrives with her friend today.  She is in a wheel chair with supplemental O2. Her friend reports prior to coming to the office visit today, she had an "event" or "seizure like" activity after walking about 30 feet to get into the car.  Once in the car, Her Left hand was shaking and her head nodded down.  She was not responding to her name for about 10-15 seconds as per her friend, then immediately woke. Ms Chowdhury reports that she had no prodrome other than feeling SOB on her way to the car. She denies associated chest pains, palpitations or dizziness.

## 2024-04-12 NOTE — H&P ADULT - PROBLEM SELECTOR PLAN 1
Suspect cardiogenic given recent diuresis, addition of GDMT, pre-existing AS    - ctm on telemetry  - low concern for intracranial pathology, will defer CTH unless further neurologic symptoms  - f/u carotid US  - f/u TSH/T4 -Suspect cardiogenic given recent diuresis, addition of GDMT, pre-existing AS  -With arm shaking     - ctm on telemetry  - Ct Head  - f/u carotid US  - f/u TSH/T4  -Structural cardiology consult -Suspect cardiogenic given recent diuresis, addition of GDMT, pre-existing AS  -With arm shaking     - ctm on telemetry  - CT Head  - f/u carotid US  - f/u TSH/T4  -Structural cardiology consult  -Orthostatics

## 2024-04-12 NOTE — PHYSICAL EXAM
[Well Developed] : well developed [Well Nourished] : well nourished [No Acute Distress] : no acute distress [Normal Conjunctiva] : normal conjunctiva [Normal Venous Pressure] : normal venous pressure [No Carotid Bruit] : no carotid bruit [Normal S1, S2] : normal S1, S2 [No Murmur] : no murmur [No Rub] : no rub [No Gallop] : no gallop [Clear Lung Fields] : clear lung fields [Good Air Entry] : good air entry [No Respiratory Distress] : no respiratory distress  [Soft] : abdomen soft [Non Tender] : non-tender [No Masses/organomegaly] : no masses/organomegaly [Normal Bowel Sounds] : normal bowel sounds [Normal Gait] : normal gait [No Cyanosis] : no cyanosis [No Clubbing] : no clubbing [No Varicosities] : no varicosities [No Rash] : no rash [No Skin Lesions] : no skin lesions [Moves all extremities] : moves all extremities [No Focal Deficits] : no focal deficits [Normal Speech] : normal speech [Alert and Oriented] : alert and oriented [Normal memory] : normal memory [Bradycardia] : bradycardic [Normal S1] : normal S1 [Normal S2] : normal S2 [III] : a grade 3 [de-identified] : b/l chronic lymphedema

## 2024-04-12 NOTE — ED PROVIDER NOTE - CLINICAL SUMMARY MEDICAL DECISION MAKING FREE TEXT BOX
73F w/ hx Chronic Systolic CHF (last TTE with EF 30%), moderate aortic stenosis, known LBBB, HTN, DM1, CKD, hypothyroidism, and chronic lymphedema presenting with syncope. Pt had sob walking to car. Once in car, she was still breathing heavily. Partner noticed pt was not responding to verbal stimuli for 10-15sec. Pt came back to quickly. Pt went to Cardiologist Dr. Nathan who recommended pt to come to ED. No fever, cp, abd pain, n/v. Leg swelling is improved from prior. Pt on torsemide 40mg which she has been taking. Pt was discharged on 3LNC which she is currently on. Exam shows ble edema, faint crackles on lung exam. +systolic murmur. Pt recently dc after tx for CHF exacerbation. c/f worsening AS vs. chf. will get labs, cxr.

## 2024-04-12 NOTE — H&P ADULT - HISTORY OF PRESENT ILLNESS
73F w HFrEF (EF 30%), mod AS, known LBBB, HTN, IDDM1, CKD#, hypothyroidism, chronic lymphedema, suspected OHS/LELIA on 3L NC home O2m p/w 1 episode of syncope. Recent admission at [facility] (3/29-4/5) for ADHF and DUNCAN s/p diuresis, discharged with new home O2 3L NC suspecting OHS/LELIA pending outpatient w/u.   Pt had sob walking to car. Once in car, she was still breathing heavily. Partner noticed pt was not responding to verbal stimuli for 10-15sec. Pt came back to quickly. Pt went to Cardiologist Dr. Nathan who recommended pt to come to ED. No fever, cp, abd pain, n/v. Leg swelling is improved from prior. Pt on torsemide 40mg which she has been taking. Pt was discharged on 3LNC which she is currently on.    In ED, patient was found afebrile, hemodynamically stable, breathing well on 3L NC. Initial labs notable for mild hyponatremia, DUNCAN on CKD, elevated proBNP and elevated pCO2 improved from prior. 73F w HFrEF (EF 30%), mod AS, known LBBB, HTN, IDDM1, CKD, hypothyroidism, chronic lymphedema, suspected OHS/LELIA on 3L NC home O2 p/w 1 episode of syncope. Recent admission at [facility] (3/29-4/5) for ADHF and DUNCAN s/p diuresis, discharged with new home O2 3L NC suspecting OHS/LELAI pending outpatient w/u. Since discharge a week ago, she has been staying at home with   Pt had sob walking to car. Once in car, she was still breathing heavily. Partner noticed pt was not responding to verbal stimuli for 10-15sec and witnessed R arm jerking. Pt gained consciousness quickly without postictal symptoms. Pt went to Cardiologist Dr. Nathan who recommended pt to come to ED. No fever, cp, abd pain, n/v. Leg swelling is improved from prior. Pt on torsemide 40mg which she has been taking. Pt was discharged on 3LNC which she is currently on. Patient reports she did not take Farxiga that she was discharged on as she was concerned about side effects.     In ED, patient was found afebrile, hemodynamically stable, breathing well on 3L NC. Initial labs notable for mild hyponatremia, DUNCAN on CKD, elevated proBNP and elevated pCO2 both improved from prior.

## 2024-04-12 NOTE — H&P ADULT - PROBLEM SELECTOR PLAN 9
Hospital Bundle    Fluids: FLUID RESTRICT  Electrolytes: Replete K > 4, Mg > 2, Phos > 3  Nutrition: Diet DASH CC 1L FLUID RESTRICT  PPX  ---VTE: HSQ  ---GI: c/w PPI  ---Resp: c/w home O2  Access: PIV  Code Status: pending further discussions  Dispo: pending clinical improvement Hospital Bundle    Fluids: FLUID RESTRICT 1.5L/day  Electrolytes: Replete K > 4, Mg > 2, Phos > 3  Nutrition: Diet DASH CC 1L FLUID RESTRICT  PPX  ---VTE: HSQ  ---GI: c/w PPI  ---Resp: c/w home O2  Access: PIV  Code Status: pending further discussions  Dispo: pending clinical improvement

## 2024-04-12 NOTE — H&P ADULT - ATTENDING COMMENTS
73F PMH HFrEF (LVEF 30%), moderate AS, HTN, DM1.5, CKD3, hypothyroidism, chronic lymphadema, presents with episode of syncope. Notably, patient was recently discharged from [place] about a week prior to admission. On day of admission, patient was with episode of shortness of breath, then sat down in car had had witnessed syncopal episode which lasted 10-15 seconds. No postictal state or incontinence.     #Syncope  - CT head noncon  - Carotid ultrasound   - Structural cardiology consult given results of most recent echo  - Orthostatics   - Continue to monitor on tele, may warrant Zio patch as patient previously had halter, however no events recorded    #Acute CHF  - Strict IO, daily standing weights  - Continue home torsemide for now     Remainder as above

## 2024-04-12 NOTE — REASON FOR VISIT
[Structural Heart and Valve Disease] : structural heart and valve disease [Symptom and Test Evaluation] : symptom and test evaluation [Cardiac Failure] : cardiac failure [Hypertension] : hypertension [Other: ____] : [unfilled]

## 2024-04-12 NOTE — PATIENT PROFILE ADULT - FUNCTIONAL ASSESSMENT - BASIC MOBILITY 6.
3-calculated by average /Not able to assess (calculate score using [facility] averaging method)

## 2024-04-12 NOTE — ED ADULT NURSE NOTE - NSFALLRISKINTERV_ED_ALL_ED

## 2024-04-12 NOTE — H&P ADULT - NSICDXPASTMEDICALHX_GEN_ALL_CORE_FT
PAST MEDICAL HISTORY:  Diabetes     H/O left bundle branch block     History of left bundle branch block (LBBB)     Hypertension     Lymphedema     Systolic heart failure, chronic     Type 1 diabetes

## 2024-04-12 NOTE — H&P ADULT - PROBLEM SELECTOR PLAN 2
TTE (4/2024) LVEF 30% w mod LVH, mildly reduced RV, AS (0.61 cm2), mod pHTN.   On home Torsemide 40 mg daily, Toprol  mg daily, Farxiga 5mg daily. Follows w Dr. Cherise rosas eventual plan to start further GDMT as renally tolerated  proBNP 51K from 80k.     - low threshold for resuming active diuresis  - strict I/Os, daily weights, fluid restrict 1L/day  - c/w home meds TTE (4/2024) LVEF 30% w mod LVH, mildly reduced RV, AS (0.61 cm2), mod pHTN.   On home Torsemide 40 mg daily, Toprol  mg daily, Farxiga 5mg daily. Follows w Dr. Cherise rosas eventual plan to start further GDMT as renally tolerated  proBNP 51K from 80k.     - low threshold for resuming active diuresis  - strict I/Os, daily weights, fluid restrict 1.5L/day  - c/w home meds

## 2024-04-12 NOTE — H&P ADULT - PROBLEM SELECTOR PLAN 7
Home farxiga 5mg, lantus 20u qhs    - c/w home Lantus 20u QHS (previously on while inpt), QUE, FSG ACHS or q6h if NPO; goal glucose  home lantus 33u qhs    - c/w home Lantus 20u QHS (previously on while inpt), QUE, FSG ACHS or q6h if NPO; goal glucose  home lantus 33u qhs    - c/w home Lantus 20u QHS (previously on while inpt), QUE, FSG ACHS or q6h if NPO; goal glucose   - CTM Dexcom for glucose monitoring

## 2024-04-12 NOTE — H&P ADULT - NSHPLABSRESULTS_GEN_ALL_CORE
LABS:                         11.3   6.88  )-----------( 174      ( 12 Apr 2024 14:23 )             37.2     04-12    132<L>  |  93<L>  |  61<H>  ----------------------------<  194<H>  4.7   |  24  |  2.58<H>    Ca    8.8      12 Apr 2024 14:23  TPro  7.8  /  Alb  3.6  /  TBili  0.7  /  DBili  x   /  AST  18  /  ALT  6<L>  /  AlkPhos  76  04-12    PT/INR - ( 12 Apr 2024 14:23 )   PT: 11.8 sec;   INR: 1.13 ratio    PTT - ( 12 Apr 2024 14:23 )  PTT:28.3 sec    Urinalysis Basic - ( 12 Apr 2024 14:23 )  Color: x / Appearance: x / SG: x / pH: x  Gluc: 194 mg/dL / Ketone: x  / Bili: x / Urobili: x   Blood: x / Protein: x / Nitrite: x   Leuk Esterase: x / RBC: x / WBC x   Sq Epi: x / Non Sq Epi: x / Bacteria: x    SARS-CoV-2: NotDetec (09 Jan 2024 19:41)    CAPILLARY BLOOD GLUCOSE    RADIOLOGY, EKG & ADDITIONAL TESTS: Reviewed.   < from: Xray Chest 1 View- PORTABLE-Urgent (Xray Chest 1 View- PORTABLE-Urgent .) (04.12.24 @ 14:43) >    FINDINGS:  The cardiac silhouette is normal in size. There are trace   bilateral pleural effusions and mild pulmonary edema. The hilar and   mediastinal structures appear unremarkable. The osseous structures are   intact.    IMPRESSION: Trace bilateral pleural effusions and mild pulmonary edema.    < end of copied text >    < from: TTE W or WO Ultrasound Enhancing Agent (04.01.24 @ 10:59) >  CONCLUSIONS:   1. Technically difficult image quality.   2. Left ventricular systolic function is moderately to severely decreased with an ejection fraction of 30 % by Nunes's method of disks.   3. Mild to moderate left ventricular hypertrophy.   4. The right ventricle is not well visualized. Based on visual assessment, the right ventricle appears mildly enlarged. mildly reduced systolic function.   5. The left atrium is mildly dilated.   6. Trace aortic regurgitation.   7. Mild mitral regurgitation.   8. Moderate to severe aortic stenosis. There is a Vmax of 3.66m/s, Mean gradient of 26mmHg and a DI of 0.19. The JUAN DANIEL is calculated as 0.61cm2 by continuity. This likely represents low flow low gradient AS.   9. Estimated pulmonary artery systolic pressure is 59 mmHg, consistent with moderate pulmonary hypertension.  10. The inferior vena cava is dilated measuring 2.60 cm in diameter, (dilated >2.1cm) with normal inspiratory collapse (normal >50%) consistent with mildly elevated right atrial pressure (~8, range 5-10mmHg).    Aortic Valve:  There is calcification of the aortic valve leaflets. There is moderate to severe aortic stenosis. The peak trans aortic velocity is 3.66 m/s, peak transaortic gradient is 53.6 mmHg and mean transaortic gradient is 26.0 mmHg with an LVOT/aortic valve VTI ratio of 0.19. The aortic valve area is estimated at 0.61 cm² by the continuity equation and 1.16 cm² by 2D planimetry. There is trace aortic regurgitation.  < end of copied text >
no

## 2024-04-12 NOTE — ED ADULT NURSE NOTE - NSFALLRISKASMTTYPE_ED_ALL_ED
"Chief Complaint   Patient presents with   • Follow-up   • Sleep Apnea         Subjective   Nic Napoles is a 63 y.o. male.     History of Present Illness   Patient comes back today for follow up of Obstructive Sleep apnea.     Patient says that he is compliant with his device and using it regularly.    Patient's symptoms of sleep disturbance and daytime sleepiness have been helped greatly with the use of PAP device, as prescribed. He feels rested most days upon awakening.     His wife reports he is moving/kicking during the night and suggested increase in RLS med.     The following portions of the patient's history were reviewed and updated as appropriate: allergies, current medications, past family history, past medical history, past social history and past surgical history.    Review of Systems   HENT: Negative for postnasal drip, rhinorrhea, sinus pressure, sinus pain and sore throat.    Respiratory: Positive for apnea. Negative for cough, chest tightness, shortness of breath and wheezing.    All other systems reviewed and are negative.      Objective   Visit Vitals  /72 (BP Location: Left arm, Patient Position: Sitting, Cuff Size: Adult)   Pulse 59   Resp 18   Ht 193 cm (75.98\")   Wt 124 kg (272 lb 9.6 oz)   SpO2 96%   BMI 33.20 kg/m²       Physical Exam  Vitals reviewed.   Constitutional:       Appearance: He is well-developed.   HENT:      Head: Atraumatic.      Mouth/Throat:      Mouth: Mucous membranes are moist.      Comments: Sutures noted on right side of oropharynx from dental work.   Eyes:      Extraocular Movements: Extraocular movements intact.   Abdominal:      Comments: Obese abdomen.    Musculoskeletal:      Comments: Gait normal.    Skin:     General: Skin is warm.   Neurological:      Mental Status: He is alert and oriented to person, place, and time.         Assessment & Plan   Diagnoses and all orders for this visit:    1. DANIEL (obstructive sleep apnea) (Primary)    2. Restless leg " syndrome    3. Obesity (BMI 30-39.9)    Other orders  -     Discontinue: rOPINIRole (REQUIP) 1 MG tablet; Take 2 tablets by mouth Every Night. Take 1 hour before bedtime.  Dispense: 30 tablet; Refill: 11  -     rOPINIRole (REQUIP) 1 MG tablet; Take 2 tablets by mouth Every Night. Take 1 hour before bedtime.  Dispense: 60 tablet; Refill: 11           Return in about 11 months (around 6/7/2023) for Recheck, For Dr. Bean, Sleep ONLY.    DISCUSSION (if any):  Continue treatment with AutoPAP at a pressure of 6/14, with a full-face mask.    Patient's compliance madhavi a was reviewed and the compliance is greater than 70%.    Humidification setup, hose and mask care discussed.    Weight loss advised.    Use every night for at least 4 hours stressed.    I have increased requip to 2 mg nightly. He may take 2 tablets or decrease to 1.5 tablets if 2 tabs seems too much.         Dictated utilizing Dragon dictation.    This document was electronically signed by MARIELA Goetz July 7, 2022  15:21 EDT    Initial (On Arrival)

## 2024-04-12 NOTE — ED ADULT TRIAGE NOTE - CHIEF COMPLAINT QUOTE
Sent in from outpatient cardiology office for episode of syncope lasting 10-15 seconds after walking 30-40 feet and getting in passenger seat of car feeling SOB  was recently hospitalized with new diagnosis of CHF and aortic stenosis

## 2024-04-12 NOTE — H&P ADULT - NSHPPHYSICALEXAM_GEN_ALL_CORE
T(C): 36.4 (04-12-24 @ 12:49), Max: 36.4 (04-12-24 @ 12:49)  T(F): 97.6 (04-12-24 @ 12:49), Max: 97.6 (04-12-24 @ 12:49)  HR: 59 (04-12-24 @ 14:23) (59 - 66)  BP: 118/50 (04-12-24 @ 14:23) (118/50 - 122/78)  BP(mean): --  RR: 20 (04-12-24 @ 14:23) (18 - 20)  SpO2: 98% (04-12-24 @ 14:23) (98% - 98%) on 3L NC    PHYSICAL EXAM:  GENERAL: NAD, well-groomed, well-developed  HEAD:  Atraumatic, Normocephalic  EYES: EOMI, PERRLA, conjunctiva and sclera clear  ENMT: No tonsillar erythema, exudates, or enlargement; Moist mucous membranes  NECK: Supple, No JVD, Normal thyroid  HEART: Regular rate and rhythm; No murmurs, rubs, or gallops  RESPIRATORY: CTA B/L, No W/R/R  ABDOMEN: Soft, Nontender, Nondistended; Bowel sounds present  NEUROLOGY: A&Ox3, nonfocal, moving all extremities  EXTREMITIES:  2+ Peripheral Pulses, No clubbing, cyanosis, or edema  SKIN: warm, dry, normal color, no rash or abnormal lesions T(C): 36.4 (04-12-24 @ 12:49), Max: 36.4 (04-12-24 @ 12:49)  T(F): 97.6 (04-12-24 @ 12:49), Max: 97.6 (04-12-24 @ 12:49)  HR: 59 (04-12-24 @ 14:23) (59 - 66)  BP: 118/50 (04-12-24 @ 14:23) (118/50 - 122/78)  BP(mean): --  RR: 20 (04-12-24 @ 14:23) (18 - 20)  SpO2: 98% (04-12-24 @ 14:23) (98% - 98%) on 3L NC    PHYSICAL EXAM:  GENERAL: NAD, well-groomed,  HEAD:  Atraumatic, Normocephalic  EYES: EOMI, conjunctiva and sclera clear  NECK: Supple, Normal thyroid  HEART: Regular rate and rhythm;   RESPIRATORY: Bibasilar crackles L>R, on 3L NC   ABDOMEN: Soft, Nontender, Nondistended;   NEUROLOGY: A&Ox3, nonfocal, moving all extremities  EXTREMITIES:  Chronic lymphedema R>L  SKIN: psoriasis diffusely on b/l arms.

## 2024-04-12 NOTE — DISCUSSION/SUMMARY
[EKG obtained to assist in diagnosis and management of assessed problem(s)] : EKG obtained to assist in diagnosis and management of assessed problem(s) [FreeTextEntry1] : This is a 73 year old woman with a history of obesity, diabetes, hypertension, hyperlipidemia, and chronic lymphedema who presents to the office for a follow up visit.  IN early 2022 she was admitted to  with increased dyspnea and decompensated heart failure. She was diuresed with IV Lasix.  She had an echocardiogram that was a technically difficult study that showed an EF of 35-40%.  This appears to be new.  I sent her for repeat echo, and she had LAD territory hypokinesis.  She underwent cath, and had non obstructive CAD.  She is clinically appears compensated from a volume perspective.  ECG illustrates sinus justo, first degree and known LBBB - ms  For now she will continue management for NICM/LVEF 30%-NYHA II-III, Mixed pHTN. She will continue Toprol 100 QD.  Entresto was stopped and can remain off for now in the setting of abnormal renal function. She will reamin off eplerenone.  Continue Torsemide 40 Qd.  Her repeat echocardiogram showed moderate-severe AS, LVEF of 30%, PASP 59. Remain off of farxiga in the setting of type 1 diabetes. In light of her recent hospitalization, worsened aortic valve stenosis, (Known) bifascicular block on ECG and now with possible "LOC", I have advised she proceed to [facility] ED for further evaluation.  She will need consultation from Structural heart team as well as continues HF management.  She will followup after her hospitalization.

## 2024-04-12 NOTE — ED PROVIDER NOTE - PROGRESS NOTE DETAILS
Laya Liz- received call from tele room that pt having few 2sec pauses. pt alert, awake. Laya Liz- updated pt on results and plan. pt agrees with plan to admit. pt feels baseline. spoke to hospitalist for admission. sent message to Dr. Luong with update.

## 2024-04-12 NOTE — ED PROVIDER NOTE - PHYSICAL EXAMINATION
General appearance: NAD, conversant, afebrile    Eyes: anicteric sclerae, DOMINIC, EOMI   HENT: Atraumatic; oropharynx clear, MMM and no ulcerations, no pharyngeal erythema or exudate   Neck: Trachea midline; Full range of motion, supple   Pulm: faint crackles bl, normal respiratory effort and no intercostal retractions, normal work of breathing   CV: RRR, +systolic murmur   Abdomen: Soft, non-tender, non-distended; no guarding or rebound   Extremities: ble edema   Psych: Appropriate affect, cooperative; alert and oriented to person, place and time

## 2024-04-12 NOTE — H&P ADULT - PROBLEM SELECTOR PROBLEM 4
Monitored by specialist- no acute findings meriting change in the plan Acute respiratory failure with hypoxia

## 2024-04-12 NOTE — ED ADULT NURSE NOTE - OBJECTIVE STATEMENT
Pt is a 74y/o female pmhx CHF, T1DM and HTN on ASA presents to the ED c/o of syncope. Pt SOB after walking to the car to go to cardiologist, once in the car pt synopsized for 15 seconds, witnessed by friend in car. Denies prodromal symptoms. States she feels back to normal. Pt presents alert & oriented x 4, calm, able to follow commands, speech clear. Breathing spontaneous & nonlabored, on 3L nasal cannula baseline. On cardiac monitor, sinus justo to 59, MD aware.  Abdomen soft & nondistended. Ambulates with a cane, chronic lymphedema noted to b/l BIPIN. Denies chest pain, SOB, abdominal pain, back pain, n/v/d, fever, chills, changes in vision, dizziness. Call bell within reach and pt instructed on how to use, bed in lowest position, side rails up, wheels locked. BReak coverage EDEN Herr

## 2024-04-12 NOTE — H&P ADULT - PROBLEM SELECTOR PLAN 4
On home O2 3L NC suspected from OHS/LELIA. CXR w trace pleff and mild pulm edema    - diuresis as above  - wean O2 as tolerated  - OOBTC

## 2024-04-13 NOTE — PHYSICAL THERAPY INITIAL EVALUATION ADULT - NSPTDMEREC_GEN_A_CORE
pt owns a rolling walker , quad cane , shower chair , 1 grab bar in shower , transport type w/c , home O2 , motorized scooter

## 2024-04-13 NOTE — PROGRESS NOTE ADULT - PROBLEM SELECTOR PLAN 8
- f/u TSH / fT4  - c/w home levothyroxine TSH mildly elevated 4.79, FT4 1.5    Plan:  - c/w home levothyroxine

## 2024-04-13 NOTE — CONSULT NOTE ADULT - ASSESSMENT
73F w HFrEF (EF 30%), mod AS, known LBBB, HTN, IDDM1, CKD, hypothyroidism, chronic lymphedema, suspected OHS/LELIA on 3L NC home O2 p/w 1 episode of syncope. Recent admission at [facility] (3/29-4/5) for ADHF and DUNCAN s/p diuresis, discharged with new home O2 3L NC suspecting OHS/LELIA pending outpatient w/u. Since discharge a week ago, she has been staying at home with   Pt had sob walking to car. Once in car, she was still breathing heavily. Partner noticed pt was not responding to verbal stimuli for 10-15sec and witnessed R arm jerking. Pt gained consciousness quickly without postictal symptoms. Pt went to Cardiologist Dr. Nathan who recommended pt to come to ED. No fever, cp, abd pain, n/v. Leg swelling is improved from prior. Pt on torsemide 40mg which she has been taking. Pt was discharged on 3LNC which she is currently on. Patient reports she did not take Farxiga that she was discharged on as she was concerned about side effects.     In ED, patient was found afebrile, hemodynamically stable, breathing well on 3L NC. Initial labs notable for mild hyponatremia, DUNCAN on CKD, elevated proBNP and elevated pCO2 both improved from prior.  pt also noticed with abn creatinine      1- CKD IV   2- CHF   3- lymphedema   4- severe AS   5- syncope     she had syncope and has severe AS   her egfr i low as well   she will stand at risk for worsening renal function and dialysis as well in setting of contrast   This was d/w pt and accompanied partner in detail.   she is to have cystatin level and 24 hr urine for creatinine clearance  continue diuretics   likely will need to have po torsemide changed to iv lasix drip but hesitant in setting of severe AS to mobilize fluid rapidly   to have renal sono   cont O2 and tele monitor   derm consult for rash LE   dw structural heart team when seen earlier

## 2024-04-13 NOTE — PHYSICAL THERAPY INITIAL EVALUATION ADULT - PLANNED THERAPY INTERVENTIONS, PT EVAL
GOAL stair train : pt will negotiate 2 steps with HR with cane independent in 2-3 weeks/balance training/bed mobility training/gait training/strengthening/transfer training

## 2024-04-13 NOTE — PHYSICAL THERAPY INITIAL EVALUATION ADULT - GENERAL OBSERVATIONS, REHAB EVAL
pt received in bed nad all siderails up HOB 35 degrees call bell,phone and table in reach , partner at b/s bed alarm active bed in lowest position per rn John pt Hr currently in 50's; pt skin le's lymphedema B , rash on skin dry patches with pink patches and lower legs at time per pt weep , currently not weeping L heel pink but blanchable PT elevated off bed with pillow heels free B

## 2024-04-13 NOTE — CONSULT NOTE ADULT - SUBJECTIVE AND OBJECTIVE BOX
seen examined. to have cystatin C and to have 24 hr urine creatinine clearance . full consult to follow. d/w structural heart team  seen examined. to have cystatin C and to have 24 hr urine creatinine clearance . full consult to follow. d/w structural heart team      [facility]  [phone]  NEPHROLOGY      INITIAL CONSULT NOTE  --------------------------------------------------------------------------------  HPI:      73F w HFrEF (EF 30%), mod AS, known LBBB, HTN, IDDM1, CKD, hypothyroidism, chronic lymphedema, suspected OHS/LELIA on 3L NC home O2 p/w 1 episode of syncope. Recent admission at [facility] (3/29-4/5) for ADHF and DUNCAN s/p diuresis, discharged with new home O2 3L NC suspecting OHS/LELIA pending outpatient w/u. Since discharge a week ago, she has been staying at home with   Pt had sob walking to car. Once in car, she was still breathing heavily. Partner noticed pt was not responding to verbal stimuli for 10-15sec and witnessed R arm jerking. Pt gained consciousness quickly without postictal symptoms. Pt went to Cardiologist Dr. Nathan who recommended pt to come to ED. No fever, cp, abd pain, n/v. Leg swelling is improved from prior. Pt on torsemide 40mg which she has been taking. Pt was discharged on 3LNC which she is currently on. Patient reports she did not take Farxiga that she was discharged on as she was concerned about side effects.     In ED, patient was found afebrile, hemodynamically stable, breathing well on 3L NC. Initial labs notable for mild hyponatremia, DUNCAN on CKD, elevated proBNP and elevated pCO2 both improved from prior.  pt also noticed with abn creatinine renal consult called.       PAST HISTORY  --------------------------------------------------------------------------------  PAST MEDICAL & SURGICAL HISTORY:  Hypertension      Diabetes      Lymphedema      H/O left bundle branch block      History of left bundle branch block (LBBB)      Systolic heart failure, chronic      Type 1 diabetes      H/O cataract  2020      History of surgical removal of meniscus of knee  left in 1971      Frozen shoulder  2000      H/O Achilles tendon repair  lengthened bilaterally, 2000      Fractured skull  1968        FAMILY HISTORY:  Family history of CVA  mom, age 57      PAST SOCIAL HISTORY:    ALLERGIES & MEDICATIONS  --------------------------------------------------------------------------------  Allergies    Ativan (Rash; Urticaria)  penicillins (Hives)    Intolerances      Standing Inpatient Medications  aspirin  chewable 81 milliGRAM(s) Oral daily  atorvastatin 80 milliGRAM(s) Oral at bedtime  dextrose 10% Bolus 125 milliLiter(s) IV Bolus once  dextrose 5%. 1000 milliLiter(s) IV Continuous <Continuous>  dextrose 5%. 1000 milliLiter(s) IV Continuous <Continuous>  dextrose 50% Injectable 12.5 Gram(s) IV Push once  dextrose 50% Injectable 25 Gram(s) IV Push once  ferrous    sulfate 325 milliGRAM(s) Oral two times a day  glucagon  Injectable 1 milliGRAM(s) IntraMuscular once  heparin   Injectable 5000 Unit(s) SubCutaneous every 8 hours  insulin glargine Injectable (LANTUS) 20 Unit(s) SubCutaneous at bedtime  insulin lispro (ADMELOG) corrective regimen sliding scale   SubCutaneous at bedtime  insulin lispro (ADMELOG) corrective regimen sliding scale   SubCutaneous three times a day before meals  levothyroxine 75 MICROGram(s) Oral daily  metoprolol succinate  milliGRAM(s) Oral daily  pantoprazole    Tablet 40 milliGRAM(s) Oral before breakfast  torsemide 40 milliGRAM(s) Oral daily    PRN Inpatient Medications  dextrose Oral Gel 15 Gram(s) Oral once PRN      REVIEW OF SYSTEMS  --------------------------------------------------------------------------------  Gen: No  fevers/chills   Skin: No rashes  Head/Eyes/Ears/Mouth: No headache; Normal hearing;  No sinus pain/discomfort, sore throat  Respiratory: + dyspnea, cough, wheezing, hemoptysis  CV: No chest pain, orthopnea   GI: No abdominal pain, diarrhea, nausea, vomiting, melena  : No dysuria, decrease urination or hesitancy urinating  hematuria, nocturia  MSK: No joint pain/swelling; no back pain  Neuro: No dizziness/lightheadedness  also with  +  edema         VITALS/PHYSICAL EXAM  --------------------------------------------------------------------------------  T(C): 36.8 (04-13-24 @ 20:48), Max: 36.8 (04-13-24 @ 20:48)  HR: 42 (04-13-24 @ 20:48) (42 - 64)  BP: 102/54 (04-13-24 @ 20:48) (102/51 - 110/62)  RR: 17 (04-13-24 @ 20:48) (17 - 18)  SpO2: 99% (04-13-24 @ 20:48) (95% - 100%)  Wt(kg): --  Height (cm): 180.3 (04-12-24 @ 12:49)  Weight (kg): 149.7 (04-12-24 @ 12:49)  BMI (kg/m2): 46.1 (04-12-24 @ 12:49)  BSA (m2): 2.61 (04-12-24 @ 12:49)      04-12-24 @ 07:01  -  04-13-24 @ 07:00  --------------------------------------------------------  IN: 0 mL / OUT: 500 mL / NET: -500 mL    04-13-24 @ 07:01  -  04-13-24 @ 22:43  --------------------------------------------------------  IN: 720 mL / OUT: 950 mL / NET: -230 mL      Physical Exam:  	Gen: Non toxic comfortable appearing  on O2  	no jvd   	Pulm: decrease bs  no rales or ronchi or wheezing  	CV: RRR, S1S2; no rub II/VI M   	Abd: +BS, soft, nontender/nondistended obese   	: No suprapubic tenderness  	UE: Warm, no cyanosis  no clubbing,  no edema  	LE: Warm, no cyanosis  no clubbing, 4+  edema  	Neuro: alert and oriented. speech coherent   	Psych: Normal affect and mood  	Skin: Warm, no decrease skin turgor + macular scaly irregular borders diffuse rase over LE   	Vascular access:    LABS/STUDIES  --------------------------------------------------------------------------------              10.0   4.89  >-----------<  175      [04-13-24 @ 07:05]              33.7     135  |  95  |  65  ----------------------------<  137      [04-13-24 @ 07:05]  4.3   |  26  |  2.66        Ca     8.5     [04-13-24 @ 07:05]      Mg     2.4     [04-13-24 @ 07:05]      Phos  4.1     [04-13-24 @ 07:05]    TPro  6.9  /  Alb  3.2  /  TBili  0.5  /  DBili  x   /  AST  15  /  ALT  8   /  AlkPhos  65  [04-13-24 @ 07:05]    PT/INR: PT 11.8 , INR 1.13       [04-12-24 @ 14:23]  PTT: 28.3       [04-12-24 @ 14:23]      Creatinine Trend:  SCr 2.66 [04-13 @ 07:05]  SCr 2.58 [04-12 @ 14:23]  SCr 2.10 [04-05 @ 05:50]  SCr 2.20 [04-04 @ 07:30]  SCr 2.20 [04-03 @ 06:30]      Urinalysis (04.12.24 @ 16:51)   Blood, Urine: Trace  Glucose Qualitative, Urine: Negative mg/dL  pH Urine: 5.5  Color: Yellow  Urine Appearance: Cloudy  Bilirubin: Negative  Ketone - Urine: Negative mg/dL  Specific Gravity: 1.006  Protein, Urine: Negative mg/dL  Urobilinogen: 0.2 mg/dL  Nitrite: Positive  Leukocyte Esterase Concentration: Large    Urine Creatinine 34      [04-12-24 @ 16:51]  Urine Protein 8      [04-12-24 @ 16:51]  Urine Sodium 27      [04-12-24 @ 16:51]  Urine Urea Nitrogen 243      [04-12-24 @ 18:07]  Urine Potassium 29      [04-12-24 @ 16:51]  Urine Osmolality 207      [04-12-24 @ 16:51]    TSH 4.79      [04-13-24 @ 07:05]  Lipid: chol 74, TG 70, HDL 33, LDL --      [04-13-24 @ 07:05]    HBsAb Nonreact      [06-06-23 @ 09:29]  HBsAg Nonreact      [06-06-23 @ 09:29]  HCV 0.15, Nonreact      [06-06-23 @ 09:29]      < from: TTE W or WO Ultrasound Enhancing Agent (04.01.24 @ 10:59) >    TRANSTHORACIC ECHOCARDIOGRAM REPORT  ________________________________________________________________________________                                      _______       Pt. Name:       EVELYN LOPEZ Study Date:    4/1/2024  MRN:            [id number]         YOB: 1950  Accession #:    [id number]        Age:           73 years  Account#:       [id number]       Gender:        F  Heart Rate:                      Height:        71.00 in (180.34 cm)  Rhythm:                          Weight:        330.69 lb (150.00 kg)  Blood Pressure: 116/68 mmHg      BSA/BMI:       2.61 m² / 46.12 kg/m²  ________________________________________________________________________________________  Referring Physician:    [id number] Luis Enrique Luo  Interpreting Physician: Pia Sutton MD  Primary Sonographer:    Marlon Chen    CPT:               ECHO TTE WO CON COMP W DOPP - 53687.m  Indication(s):     Dyspnea, unspecified - R06.00  Procedure:         Transthoracic echocardiogram with 2-D, M-mode and complete                     spectral and color flow Doppler.  Ordering Location: [facility]  Admission Status:  Inpatient  Study Information: Image quality for this study is technically difficult.    _______________________________________________________________________________________     CONCLUSIONS:      1. Technically difficult image quality.   2. Left ventricular systolic function is moderately to severely decreased with an ejection fraction of 30 % by Nunes's method of disks.   3. Mildto moderate left ventricular hypertrophy.   4. The right ventricle is not well visualized. Based on visual assessment, the right ventricle appears mildly enlarged. mildly reduced systolic function.   5. The left atrium is mildly dilated.   6. Trace aortic regurgitation.   7. Mild mitral regurgitation.   8. Moderate to severe aortic stenosis. There is a Vmax of 3.66m/s, Mean gradient of 26mmHg and a DI of 0.19. The JUAN DANIEL is calculated as 0.61cm2 by continuity. This likely represents low flow low gradient AS.   9. Estimated pulmonary artery systolic pressure is 59 mmHg, consistent with moderate pulmonary hypertension.  10. The inferior vena cava is dilated measuring 2.60 cm in diameter, (dilated >2.1cm) with normal inspiratory collapse (normal >50%) consistent with mildly elevated right atrial pressure (~8, range 5-10mmHg).    < end of copied text >

## 2024-04-13 NOTE — PROGRESS NOTE ADULT - ASSESSMENT
73F w HFrEF (EF 30%), mod AS, known LBBB, HTN, IDDM1, CKD, hypothyroidism, chronic lymphedema, p/w 1 episode of syncope with R arm jerking.

## 2024-04-13 NOTE — CONSULT NOTE ADULT - TIME BILLING
- Review of records, telemetry, vital signs and daily labs.   - General and cardiovascular physical examination.  - Generation of cardiovascular treatment plan.  - Coordination of care.      Patient was seen and examined by me on 04/13/2024,interim events noted,labs and radiology studies reviewed.  Cuco Tubbs MD,FACC.  [address] [place], [place] [id number].  [phone]

## 2024-04-13 NOTE — CONSULT NOTE ADULT - SUBJECTIVE AND OBJECTIVE BOX
Structural Heart Team    History of Present Illness:          04-12 @ 07:01  -  04-13 @ 06:47  --------------------------------------------------------  IN: 0 mL / OUT: 500 mL / NET: -500 mL        PAST MEDICAL & SURGICAL HISTORY:  Hypertension  Diabetes  Lymphedema  H/O left bundle branch block  History of left bundle branch block (LBBB)  Systolic heart failure, chronic  Type 1 diabetes  H/O cataract 2020  History of surgical removal of meniscus of knee left in 1971  Frozen shoulder 2000  H/O Achilles tendon repair lengthened bilaterally, 2000  Fractured skull 1968    FAMILY HISTORY:  Family history of CVA  mom, age 57    Ativan (Rash; Urticaria)  penicillins (Hives)    aspirin  chewable 81 milliGRAM(s) Oral daily  atorvastatin 80 milliGRAM(s) Oral at bedtime  dextrose 10% Bolus 125 milliLiter(s) IV Bolus once  dextrose 5%. 1000 milliLiter(s) IV Continuous <Continuous>  dextrose 5%. 1000 milliLiter(s) IV Continuous <Continuous>  dextrose 50% Injectable 12.5 Gram(s) IV Push once  dextrose 50% Injectable 25 Gram(s) IV Push once  ferrous    sulfate 325 milliGRAM(s) Oral two times a day  glucagon  Injectable 1 milliGRAM(s) IntraMuscular once  heparin   Injectable 5000 Unit(s) SubCutaneous every 8 hours  insulin glargine Injectable (LANTUS) 20 Unit(s) SubCutaneous at bedtime  insulin lispro (ADMELOG) corrective regimen sliding scale   SubCutaneous at bedtime  insulin lispro (ADMELOG) corrective regimen sliding scale   SubCutaneous three times a day before meals  levothyroxine 75 MICROGram(s) Oral daily  metoprolol succinate  milliGRAM(s) Oral daily  pantoprazole    Tablet 40 milliGRAM(s) Oral before breakfast  torsemide 40 milliGRAM(s) Oral daily    Home Medications:  aspirin 81 mg oral tablet, chewable: 1 tab(s) orally once a day (12 Apr 2024 17:37)  atorvastatin 80 mg oral tablet: 1 tab(s) orally once a day (at bedtime) (12 Apr 2024 17:37)  ferrous sulfate 324 mg (65 mg elemental iron) oral tablet: 1 tab(s) orally 2 times a day (12 Apr 2024 17:37)  Lantus 100 units/mL subcutaneous solution: 33 unit(s) subcutaneous once a day (at bedtime) (12 Apr 2024 17:37)  levothyroxine 75 mcg (0.075 mg) oral tablet: 1 tab(s) orally once a day (12 Apr 2024 17:37)  metoprolol succinate 100 mg oral tablet, extended release: 1 tab(s) orally once a day (12 Apr 2024 17:37)    Review of Symptoms:  14 point review of systems is negative except what is described above    Physical Examination:  T(C): 36.5 (04-13-24 @ 04:11), Max: 36.5 (04-13-24 @ 00:14)  HR: 61 (04-13-24 @ 04:11) (57 - 116)  BP: 103/53 (04-13-24 @ 04:11) (102/51 - 126/65)  RR: 18 (04-13-24 @ 04:11) (16 - 20)  SpO2: 99% (04-13-24 @ 04:11) (87% - 100%)  Wt(kg): --    Laboratory:                      11.3   6.88  )-----------( 174      ( 12 Apr 2024 14:23 )             37.2    04-12  132<L>  |  93<L>  |  61<H>  ----------------------------<  194<H>  4.7   |  24  |  2.58<H>  Ca    8.8      12 Apr 2024 14:23  TPro  7.8  /  Alb  3.6  /  TBili  0.7  /  DBili  x   /  AST  18  /  ALT  6<L>  /  AlkPhos  76  04-12  PT/INR - ( 12 Apr 2024 14:23 )   PT: 11.8 sec;   INR: 1.13 ratio    PTT - ( 12 Apr 2024 14:23 )  PTT:28.3 sec   History of Present Illness:   73F w HFrEF (EF 30%), mod AS, known LBBB, HTN, IDDM1, CKD, hypothyroidism, chronic lymphedema, suspected OHS/LELIA on 3L NC home O2 p/w 1 episode of syncope. Recent admission at [facility] (3/29-4/5) for ADHF and DUNCAN s/p diuresis, discharged with new home O2 3L NC suspecting OHS/LELIA pending outpatient w/u. Since discharge a week ago, she has been staying at home with   Pt had sob walking to car. Once in car, she was still breathing heavily. Partner noticed pt was not responding to verbal stimuli for 10-15sec and witnessed R arm jerking. Pt gained consciousness quickly without postictal symptoms. Pt went to Cardiologist Dr. Nathan who recommended pt to come to ED. No fever, cp, abd pain, n/v. Leg swelling is improved from prior. Pt on torsemide 40mg which she has been taking. Pt was discharged on 3LNC which she is currently on. Patient reports she did not take Farxiga that she was discharged on as she was concerned about side effects.     In ED, patient was found afebrile, hemodynamically stable, breathing well on 3L NC. Initial labs notable for mild hyponatremia, DUNCAN on CKD, elevated proBNP and elevated pCO2 both improved from prior.  EKG consistent with sinus bradycardia at 58bpm, 1st degree AVB (Sharath 318ms) with LBBB (QRSd 176).    At time the patient is feeling she is not complaining of any cardiopulmonary compalints. Denies any chills, lightheaded sensation, dizziness or palpitations.  Denies any prodrome prior to her event except for ambulating and feeling short of breath.  Did not feel lightheaded, dizzy or experience palpitations.  Denies any further palpitations outpatient.  Does note that she experiences significant dyspnea upon exertion which is led to multiple hospitalizations during the past year.  She feels she is less volume overloaded at the time of her current hospitalization.    Review of Systems:  Review of Systems: CONSTITUTIONAL: No weakness, fevers or chills  EYES/ENT: No visual changes;  No vertigo or throat pain   NECK: No pain or stiffness  RESPIRATORY: No cough, wheezing, hemoptysis; +SOB  CARDIOVASCULAR: No chest pain or palpitations, +chronic lymphedema  GASTROINTESTINAL: No abdominal or epigastric pain. No nausea, vomiting, or hematemesis; No diarrhea or constipation. No melena or hematochezia.  GENITOURINARY: No dysuria, frequency or hematuria  NEUROLOGICAL: +Syncope, +arm jerking  SKIN: +psoriatic rashes  MUSCULOSKELETAL: No joint pain, muscle pain.      Patient History:    Past Medical, Past Surgical, and Family History:  PAST MEDICAL HISTORY:  Diabetes     H/O left bundle branch block     History of left bundle branch block (LBBB)     Hypertension     Lymphedema     Systolic heart failure, chronic     Type 1 diabetes.     PAST SURGICAL HISTORY:  Fractured skull 1968    Frozen shoulder 2000    H/O Achilles tendon repair lengthened bilaterally, 2000    H/O cataract 2020    History of surgical removal of meniscus of knee left in 1971.     FAMILY HISTORY:  Family history of CVA, mom, age 57.     Social History:  · Substance use No  · Social History (marital status, living situation, occupation, and sexual history) Ambulates with walker, uses wheelchair  Lives with partner     Tobacco Screening:  · Core Measure Site No  · Has the patient used tobacco in the past 30 days? No    Risk Assessment:    Present on Admission:  Deep Venous Thrombosis no  Pulmonary Embolus no      TTE W or WO Ultrasound Enhancing Agent (04.01.24 @ 10:59) >  CONCLUSIONS:      1. Technically difficult image quality.   2. Left ventricular systolic function is moderately to severely decreased with an ejection fraction of 30 % by Nunes's method of disks.   3. Mildto moderate left ventricular hypertrophy.   4. The right ventricle is not well visualized. Based on visual assessment, the right ventricle appears mildly enlarged. mildly reduced systolic function.   5. The left atrium is mildly dilated.   6. Trace aortic regurgitation.   7. Mild mitral regurgitation.   8. Moderate to severe aortic stenosis. There is a Vmax of 3.66m/s, Mean gradient of 26mmHg and a DI of 0.19. The JUAN DANIEL is calculated as 0.61cm2 by continuity. This likely represents low flow low gradient AS.   9. Estimated pulmonary artery systolic pressure is 59 mmHg, consistent with moderate pulmonary hypertension.  10. The inferior vena cava is dilated measuring 2.60 cm in diameter, (dilated >2.1cm) with normal inspiratory collapse (normal >50%) consistent with mildly elevated right atrial pressure (~8, range 5-10mmHg).    ________________________________________________________________________________________  FINDINGS:     Left Ventricle:  Left ventricular systolic function is moderately to severely decreased with a calculated ejection fraction of 30 % by the Nunes's biplane method of disks. Mild to moderate left ventricular hypertrophy.     Right Ventricle:  The right ventricle is not well visualized. Based on visual assessment, the right ventricle appears mildly enlarged. The right ventricular cavity is mildly enlarged in size and mildly reduced systolic function. Tricuspid annular plane systolic excursion (TAPSE) is 2.4 cm (normal >=1.7 cm).     Left Atrium:  The left atrium is mildly dilated with an indexed volume of 34.20 ml/m².     Right Atrium:  The right atrium is normal in size.     Aortic Valve:  There is calcification of the aortic valve leaflets. There is moderate to severe aortic stenosis. The peak transaorticvelocity is 3.66 m/s, peak transaortic gradient is 53.6 mmHg and mean transaortic gradient is 26.0 mmHg with an LVOT/aortic valve VTI ratio of 0.19. The aortic valve area is estimated at 0.61 cm² by the continuity equation and 1.16 cm² by 2D planimetry. There is trace aortic regurgitation.     Mitral Valve:  There is mild calcification of the mitral valve annulus. Mitral valve leaflets are diffusely calcified. There is mild mitral regurgitation.     Tricuspid Valve:  There is mild tricuspid regurgitation. Estimated pulmonary artery systolic pressure is 59 mmHg, consistent with moderate pulmonary hypertension.     Pulmonic Valve:  The pulmonic valve was not well visualized. There is trace pulmonic regurgitation.     Aorta:  The aortic root at the sinuses of Valsalva is normal in size, measuring 2.90 cm (indexed 1.11 cm/m²).     Systemic Veins:  The inferior vena cava is dilated measuring 2.60 cm in diameter, (dilated >2.1cm) with normal inspiratory collapse (normal >50%) consistent with mildly elevated right atrial pressure (~8, range 5-10mmHg).  ____________________________________________________________________  QUANTITATIVE DATA:  Left Ventricle Measurements: (Indexed to BSA)     IVSd (2D):   1.3 cm  LVPWd (2D):  1.2 cm  LVIDd (2D):  5.4 cm  LVIDs (2D):  4.7 cm  LV Mass:     284 g  108.7 g/m²  LV Vol d, MOD A2C: 138.0 ml 52.85 ml/m²  LV Vol d, MOD A4C: 114.0 ml 43.65 ml/m²  LV Vol d, MOD BP:  128.3 ml 49.14 ml/m²  LV Vol s, MOD A2C: 86.9 ml  33.28 ml/m²  LV Vol s, MOD A4C: 88.5ml  33.89 ml/m²  LV Vol s, MOD BP:  89.5 ml  34.28 ml/m²  LVOT SV MOD BP:    38.8 ml  LV EF% MOD BP:     30 %     MV E Vmax:    0.75 m/s  MV A Vmax:    0.97 m/s  MV E/A:       0.77  e' lateral:   8.16 cm/s  e' medial:    3.59 cm/s  E/e' lateral: 9.18  E/e' medial:  20.86  E/e' Average: 12.75  MV DT:        268 msec    Aorta Measurements: (Normal range) (Indexed to BSA)     Sinuses of Valsalva: 2.90 cm (2.7 - 3.3 cm)       Left Atrium Measurements: (Indexed to BSA)  LA Diam 2D: 4.90 cm    Right Ventricle Measurements:     TAPSE:            2.4 cm  RV Base (RVID1):  4.9 cm  RV Mid (RVID2):   4.1 cm  RV Major (RVID3): 8.3 cm       LVOT / RVOT/ Qp/Qs Data: (Indexed to BSA)  LVOT Diameter: 2.00 cm  LVOT Vmax:     0.67 m/s  LVOT VTI:      19.00 cm  LVOT SV:       59.7 ml  22.86 ml/m²    Aortic Valve Measurements:  AV Vmax:                3.7 m/s  AV Peak Gradient:       53.6 mmHg  AV Mean Gradient:       26.0 mmHg  AV VTI:                 97.8 cm  AV VTI Ratio:           0.19  AoV EOA, Contin:      0.61 cm²  AoV EOA, Contin i:      0.23 cm²/m²  AoV area, 2D planim:    1.16 cm²  AoV Dimensionless Index 0.19    Mitral Valve Measurements:     MV E Vmax: 0.7 m/s  MV A Vmax: 1.0 m/s  MV E/A:    0.8       Tricuspid Valve Measurements:     TR Vmax:          3.6 m/s  TR Peak Gradient: 51.3 mmHg  RA Pressure:      8 mmHg  PASP:             59 mmHg    < end of copied text >    Catheterization:   < from: Cardiac Catheterization (07.27.22 @ 14:43) >  Diagnostic Conclusions:   Coronary angiography demonstrated minor luminal irregularities       Procedure Narrative:   The risks and alternatives of the procedures and conscious sedation  were explained to the patient and informed consent was  obtained. The patient was brought to the cath lab and placed on the  exam table.  Access   Right radial artery:   The puncture site was infiltrated with 1% Lidocaine. Vascular access  was obtained using modified seldinger technique.    Diagnostic Findings:     Coronary Angiography   The coronary circulation is right dominant.      LM   Left main artery: The segment is visually normal in size and  structure. Angiography shows no disease.    LAD   Left anterior descending artery: Angiography shows minor  irregularities. First diagonal: Angiography shows minor  irregularities.    CX   Circumflex: Angiography shows minor irregularities. First obtuse  marginal: Angiography shows minor irregularities.    RCA   Right coronary artery: The segment is large, dominant. Angiography  shows minor irregularities.    < end of copied text >     Assessment/plan  Severe aortic valve stenosis  Discussion was had patient concerning her clinical presentation.  It was explained to the patient that it is not clear what is the etiology of her syncopal event.  She was informed that she has multiple comorbidities that are likely contributing to her shortness of breath, fatigue and lower extremity edema.  Explained what is aortic valve stenosis.  The associated signs and symptoms of severe aortic valve stenosis was reviewed.  The patient's echocardiogram study was reviewed with director of structural imaging Dr. Taylor.  The echo demonstrates a dilated left ventricle with moderately reduced function of 30%.  Right ventricle is moderate to severely dilated with mildly reduced function.  Calcified trileaflet aortic valve.  Severe aortic valve stenosis with aortic valve area 0.8 square, 0.2 with left ventricular stroke-volume 76 mL left ventricular stroke-volume index 29 mL/meters squared.  Patient has low-flow low gradient severe aortic valve stenosis.    Went over with the patient multiple comorbidities with likely contributing her clinical status.  It was explained what is the natural pathophysiology of untreated severe aortic valve stenosis.  However, it was explained that due to her multiple comorbidities it is not clear patient was being on her aortic valve stenosis if it would significantly impact her overall prognosis.    At this time patient is interested in further for her aortic valve stenosis.  Prior to proceeding it is recommended the patient be seen by nephrology.  The patient is at markedly increased risk for dialysis.  It is not clear if she is a dialysis candidate.  She wishes to discuss with nephrology further their recommendations.    Recommend patient to be seen dermatology to assist in optimization of her psoriasis and lymphedema.  Patient markedly increased risk and there is concern that any procedures through the groin would put her at heightened risk.  Recommend recommendation to try to minimize patient moving following.    All questions and concerns of the patient were addressed.    Findings and plan were discussed with medicine, cardiac and nephrology teams.          dextrose 50% Injectable 12.5 Gram(s) IV Push once  dextrose 50% Injectable 25 Gram(s) IV Push once  ferrous    sulfate 325 milliGRAM(s) Oral two times a day  glucagon  Injectable 1 milliGRAM(s) IntraMuscular once  heparin   Injectable 5000 Unit(s) SubCutaneous every 8 hours  insulin glargine Injectable (LANTUS) 20 Unit(s) SubCutaneous at bedtime  insulin lispro (ADMELOG) corrective regimen sliding scale   SubCutaneous at bedtime  insulin lispro (ADMELOG) corrective regimen sliding scale   SubCutaneous three times a day before meals  levothyroxine 75 MICROGram(s) Oral daily  metoprolol succinate  milliGRAM(s) Oral daily  pantoprazole    Tablet 40 milliGRAM(s) Oral before breakfast  torsemide 40 milliGRAM(s) Oral daily    Home Medications:  aspirin 81 mg oral tablet, chewable: 1 tab(s) orally once a day (12 Apr 2024 17:37)  atorvastatin 80 mg oral tablet: 1 tab(s) orally once a day (at bedtime) (12 Apr 2024 17:37)  ferrous sulfate 324 mg (65 mg elemental iron) oral tablet: 1 tab(s) orally 2 times a day (12 Apr 2024 17:37)  Lantus 100 units/mL subcutaneous solution: 33 unit(s) subcutaneous once a day (at bedtime) (12 Apr 2024 17:37)  levothyroxine 75 mcg (0.075 mg) oral tablet: 1 tab(s) orally once a day (12 Apr 2024 17:37)  metoprolol succinate 100 mg oral tablet, extended release: 1 tab(s) orally once a day (12 Apr 2024 17:37)    Review of Symptoms:  14 point review of systems is negative except what is described above    Physical Examination:  T(C): 36.5 (04-13-24 @ 04:11), Max: 36.5 (04-13-24 @ 00:14)  HR: 61 (04-13-24 @ 04:11) (57 - 116)  BP: 103/53 (04-13-24 @ 04:11) (102/51 - 126/65)  RR: 18 (04-13-24 @ 04:11) (16 - 20)  SpO2: 99% (04-13-24 @ 04:11) (87% - 100%)  Wt(kg): --    Laboratory:                      11.3   6.88  )-----------( 174      ( 12 Apr 2024 14:23 )             37.2    04-12  132<L>  |  93<L>  |  61<H>  ----------------------------<  194<H>  4.7   |  24  |  2.58<H>  Ca    8.8      12 Apr 2024 14:23  TPro  7.8  /  Alb  3.6  /  TBili  0.7  /  DBili  x   /  AST  18  /  ALT  6<L>  /  AlkPhos  76  04-12  PT/INR - ( 12 Apr 2024 14:23 )   PT: 11.8 sec;   INR: 1.13 ratio    PTT - ( 12 Apr 2024 14:23 )  PTT:28.3 sec

## 2024-04-13 NOTE — PROVIDER CONTACT NOTE (OTHER) - ASSESSMENT
Patient awake,alert, oriented x4, reading on the Ipad.Patient denies chest pain/discomfort/dizziness.See vitals

## 2024-04-13 NOTE — PROGRESS NOTE ADULT - SUBJECTIVE AND OBJECTIVE BOX
PROGRESS NOTE:   Authored by Dr. Mireya Hayden MD (PGY-1). Pager [facility] [phone]/ [facility]     Patient is a 73y old  Female who presents with a chief complaint of Syncope (2024 06:46)      SUBJECTIVE / OVERNIGHT EVENTS:  No acute events overnight.     All other ROS neg except as above in HPI    MEDICATIONS  (STANDING):  aspirin  chewable 81 milliGRAM(s) Oral daily  atorvastatin 80 milliGRAM(s) Oral at bedtime  dextrose 10% Bolus 125 milliLiter(s) IV Bolus once  dextrose 5%. 1000 milliLiter(s) (100 mL/Hr) IV Continuous <Continuous>  dextrose 5%. 1000 milliLiter(s) (50 mL/Hr) IV Continuous <Continuous>  dextrose 50% Injectable 12.5 Gram(s) IV Push once  dextrose 50% Injectable 25 Gram(s) IV Push once  ferrous    sulfate 325 milliGRAM(s) Oral two times a day  glucagon  Injectable 1 milliGRAM(s) IntraMuscular once  heparin   Injectable 5000 Unit(s) SubCutaneous every 8 hours  insulin glargine Injectable (LANTUS) 20 Unit(s) SubCutaneous at bedtime  insulin lispro (ADMELOG) corrective regimen sliding scale   SubCutaneous at bedtime  insulin lispro (ADMELOG) corrective regimen sliding scale   SubCutaneous three times a day before meals  levothyroxine 75 MICROGram(s) Oral daily  metoprolol succinate  milliGRAM(s) Oral daily  pantoprazole    Tablet 40 milliGRAM(s) Oral before breakfast  torsemide 40 milliGRAM(s) Oral daily    MEDICATIONS  (PRN):  dextrose Oral Gel 15 Gram(s) Oral once PRN Blood Glucose LESS THAN 70 milliGRAM(s)/deciliter      CAPILLARY BLOOD GLUCOSE      POCT Blood Glucose.: 181 mg/dL (2024 21:47)  POCT Blood Glucose.: 185 mg/dL (2024 17:58)    I&O's Summary    2024 07:01  -  2024 07:00  --------------------------------------------------------  IN: 0 mL / OUT: 500 mL / NET: -500 mL        PHYSICAL EXAM:  Vital Signs Last 24 Hrs  T(C): 36.5 (2024 04:11), Max: 36.5 (2024 00:14)  T(F): 97.7 (2024 04:11), Max: 97.7 (2024 00:14)  HR: 61 (2024 04:11) (57 - 116)  BP: 103/53 (2024 04:11) (102/51 - 126/65)  BP(mean): --  RR: 18 (2024 04:11) (16 - 20)  SpO2: 99% (2024 04:11) (87% - 100%)    Parameters below as of 2024 04:11  Patient On (Oxygen Delivery Method): nasal cannula  O2 Flow (L/min): 3      CONSTITUTIONAL: NAD, well-developed  HEET: MMM, EOMI, PERRLA  NECK: supple  RESPIRATORY: Normal respiratory effort; lungs are clear to auscultation bilaterally  CARDIOVASCULAR: Regular rate and rhythm, normal S1 and S2, no murmur/rub/gallop; No lower extremity edema; Peripheral pulses are 2+ bilaterally  ABDOMEN: Nontender to palpation, normoactive bowel sounds, no rebound/guarding; No hepatosplenomegaly  MUSCULOSKELETAL: no clubbing or cyanosis of digits; no joint swelling or tenderness to palpation  NEURO: Moving all four extremities, sensation grossly intact  PSYCH: A+O to person, place, and time; affect appropriate  SKIN: No rash    LABS:                        10.0   4.89  )-----------( 175      ( 2024 07:05 )             33.7     04-12    132<L>  |  93<L>  |  61<H>  ----------------------------<  194<H>  4.7   |  24  |  2.58<H>    Ca    8.8      2024 14:23    TPro  7.8  /  Alb  3.6  /  TBili  0.7  /  DBili  x   /  AST  18  /  ALT  6<L>  /  AlkPhos  76  04-12    PT/INR - ( 2024 14:23 )   PT: 11.8 sec;   INR: 1.13 ratio         PTT - ( 2024 14:23 )  PTT:28.3 sec      Urinalysis Basic - ( 2024 16:51 )    Color: Yellow / Appearance: Cloudy / S.006 / pH: x  Gluc: x / Ketone: Negative mg/dL  / Bili: Negative / Urobili: 0.2 mg/dL   Blood: x / Protein: Negative mg/dL / Nitrite: Positive   Leuk Esterase: Large / RBC: 0 /HPF /  /HPF   Sq Epi: x / Non Sq Epi: 0 /HPF / Bacteria: Many /HPF         PROGRESS NOTE:   Authored by Dr. Mireya Hayden MD (PGY-1). Pager [facility] [phone]/ [facility]     Patient is a 73y old  Female who presents with a chief complaint of Syncope (2024 06:46)      SUBJECTIVE / OVERNIGHT EVENTS:  No acute events overnight. Feels well. Denies SOB, CP, abd pain. On 3L NC     All other ROS neg except as above in HPI    MEDICATIONS  (STANDING):  aspirin  chewable 81 milliGRAM(s) Oral daily  atorvastatin 80 milliGRAM(s) Oral at bedtime  dextrose 10% Bolus 125 milliLiter(s) IV Bolus once  dextrose 5%. 1000 milliLiter(s) (100 mL/Hr) IV Continuous <Continuous>  dextrose 5%. 1000 milliLiter(s) (50 mL/Hr) IV Continuous <Continuous>  dextrose 50% Injectable 12.5 Gram(s) IV Push once  dextrose 50% Injectable 25 Gram(s) IV Push once  ferrous    sulfate 325 milliGRAM(s) Oral two times a day  glucagon  Injectable 1 milliGRAM(s) IntraMuscular once  heparin   Injectable 5000 Unit(s) SubCutaneous every 8 hours  insulin glargine Injectable (LANTUS) 20 Unit(s) SubCutaneous at bedtime  insulin lispro (ADMELOG) corrective regimen sliding scale   SubCutaneous at bedtime  insulin lispro (ADMELOG) corrective regimen sliding scale   SubCutaneous three times a day before meals  levothyroxine 75 MICROGram(s) Oral daily  metoprolol succinate  milliGRAM(s) Oral daily  pantoprazole    Tablet 40 milliGRAM(s) Oral before breakfast  torsemide 40 milliGRAM(s) Oral daily    MEDICATIONS  (PRN):  dextrose Oral Gel 15 Gram(s) Oral once PRN Blood Glucose LESS THAN 70 milliGRAM(s)/deciliter      CAPILLARY BLOOD GLUCOSE      POCT Blood Glucose.: 181 mg/dL (2024 21:47)  POCT Blood Glucose.: 185 mg/dL (2024 17:58)    I&O's Summary    2024 07:01  -  2024 07:00  --------------------------------------------------------  IN: 0 mL / OUT: 500 mL / NET: -500 mL        PHYSICAL EXAM:  Vital Signs Last 24 Hrs  T(C): 36.5 (2024 04:11), Max: 36.5 (2024 00:14)  T(F): 97.7 (2024 04:11), Max: 97.7 (2024 00:14)  HR: 61 (2024 04:11) (57 - 116)  BP: 103/53 (2024 04:11) (102/51 - 126/65)  BP(mean): --  RR: 18 (2024 04:11) (16 - 20)  SpO2: 99% (2024 04:11) (87% - 100%)    Parameters below as of 2024 04:11  Patient On (Oxygen Delivery Method): nasal cannula  O2 Flow (L/min): 3      PHYSICAL EXAM:  GENERAL: NAD, well-groomed,  HEAD:  Atraumatic, Normocephalic  EYES: EOMI, conjunctiva and sclera clear  NECK: Supple, Normal thyroid  HEART: Regular rate and rhythm;   RESPIRATORY: Bibasilar crackles, on 3L NC   ABDOMEN: Soft, Nontender, Nondistended;   NEUROLOGY: A&Ox3, nonfocal, moving all extremities      LABS:                        10.0   4.89  )-----------( 175      ( 2024 07:05 )             33.7     04-12    132<L>  |  93<L>  |  61<H>  ----------------------------<  194<H>  4.7   |  24  |  2.58<H>    Ca    8.8      2024 14:23    TPro  7.8  /  Alb  3.6  /  TBili  0.7  /  DBili  x   /  AST  18  /  ALT  6<L>  /  AlkPhos  76  04-12    PT/INR - ( 2024 14:23 )   PT: 11.8 sec;   INR: 1.13 ratio         PTT - ( 2024 14:23 )  PTT:28.3 sec      Urinalysis Basic - ( 2024 16:51 )    Color: Yellow / Appearance: Cloudy / S.006 / pH: x  Gluc: x / Ketone: Negative mg/dL  / Bili: Negative / Urobili: 0.2 mg/dL   Blood: x / Protein: Negative mg/dL / Nitrite: Positive   Leuk Esterase: Large / RBC: 0 /HPF /  /HPF   Sq Epi: x / Non Sq Epi: 0 /HPF / Bacteria: Many /HPF         PROGRESS NOTE:   Authored by Dr. Mireya Hayden MD (PGY-1). Pager [facility] [phone]/ [facility]     Patient is a 73y old  Female who presents with a chief complaint of Syncope (2024 06:46)      SUBJECTIVE / OVERNIGHT EVENTS:  No acute events overnight. Feels well. Denies SOB, CP, abd pain. On 3L NC. Denies dysuria.    All other ROS neg except as above in HPI    MEDICATIONS  (STANDING):  aspirin  chewable 81 milliGRAM(s) Oral daily  atorvastatin 80 milliGRAM(s) Oral at bedtime  dextrose 10% Bolus 125 milliLiter(s) IV Bolus once  dextrose 5%. 1000 milliLiter(s) (100 mL/Hr) IV Continuous <Continuous>  dextrose 5%. 1000 milliLiter(s) (50 mL/Hr) IV Continuous <Continuous>  dextrose 50% Injectable 12.5 Gram(s) IV Push once  dextrose 50% Injectable 25 Gram(s) IV Push once  ferrous    sulfate 325 milliGRAM(s) Oral two times a day  glucagon  Injectable 1 milliGRAM(s) IntraMuscular once  heparin   Injectable 5000 Unit(s) SubCutaneous every 8 hours  insulin glargine Injectable (LANTUS) 20 Unit(s) SubCutaneous at bedtime  insulin lispro (ADMELOG) corrective regimen sliding scale   SubCutaneous at bedtime  insulin lispro (ADMELOG) corrective regimen sliding scale   SubCutaneous three times a day before meals  levothyroxine 75 MICROGram(s) Oral daily  metoprolol succinate  milliGRAM(s) Oral daily  pantoprazole    Tablet 40 milliGRAM(s) Oral before breakfast  torsemide 40 milliGRAM(s) Oral daily    MEDICATIONS  (PRN):  dextrose Oral Gel 15 Gram(s) Oral once PRN Blood Glucose LESS THAN 70 milliGRAM(s)/deciliter      CAPILLARY BLOOD GLUCOSE      POCT Blood Glucose.: 181 mg/dL (2024 21:47)  POCT Blood Glucose.: 185 mg/dL (2024 17:58)    I&O's Summary    2024 07:01  -  2024 07:00  --------------------------------------------------------  IN: 0 mL / OUT: 500 mL / NET: -500 mL        PHYSICAL EXAM:  Vital Signs Last 24 Hrs  T(C): 36.5 (2024 04:11), Max: 36.5 (2024 00:14)  T(F): 97.7 (2024 04:11), Max: 97.7 (2024 00:14)  HR: 61 (2024 04:11) (57 - 116)  BP: 103/53 (2024 04:11) (102/51 - 126/65)  BP(mean): --  RR: 18 (2024 04:11) (16 - 20)  SpO2: 99% (2024 04:11) (87% - 100%)    Parameters below as of 2024 04:11  Patient On (Oxygen Delivery Method): nasal cannula  O2 Flow (L/min): 3      PHYSICAL EXAM:  GENERAL: NAD, well-groomed,  HEAD:  Atraumatic, Normocephalic  EYES: EOMI, conjunctiva and sclera clear  NECK: Supple, Normal thyroid  HEART: Regular rate and rhythm;   RESPIRATORY: Bibasilar crackles, on 3L NC   ABDOMEN: Soft, Nontender, Nondistended;   NEUROLOGY: A&Ox3, nonfocal, moving all extremities      LABS:                        10.0   4.89  )-----------( 175      ( 2024 07:05 )             33.7     04-12    132<L>  |  93<L>  |  61<H>  ----------------------------<  194<H>  4.7   |  24  |  2.58<H>    Ca    8.8      2024 14:23    TPro  7.8  /  Alb  3.6  /  TBili  0.7  /  DBili  x   /  AST  18  /  ALT  6<L>  /  AlkPhos  76  04-12    PT/INR - ( 2024 14:23 )   PT: 11.8 sec;   INR: 1.13 ratio         PTT - ( 2024 14:23 )  PTT:28.3 sec      Urinalysis Basic - ( 2024 16:51 )    Color: Yellow / Appearance: Cloudy / S.006 / pH: x  Gluc: x / Ketone: Negative mg/dL  / Bili: Negative / Urobili: 0.2 mg/dL   Blood: x / Protein: Negative mg/dL / Nitrite: Positive   Leuk Esterase: Large / RBC: 0 /HPF /  /HPF   Sq Epi: x / Non Sq Epi: 0 /HPF / Bacteria: Many /HPF

## 2024-04-13 NOTE — CONSULT NOTE ADULT - ASSESSMENT
73F w HFrEF (EF 30%), mod AS, known LBBB, HTN, IDDM1, CKD, hypothyroidism, chronic lymphedema, p/w 1 episode of syncope with R arm jerking.         #Syncope-Aortic Stenosis  -Episode after exertion  Known Aortic Stenosis likely Severe in setting of decreased LVEF  Structual Heart evaluation      #Chronic Systolic HF (EF 38%), mod AS, HTN, LBBB, Lymphedema   - Has known systolic HF and mod AS.    - Repeat TTE showed EF 30%, mild-mod LVH, mildly reduced RVF, mod-severe AS (.61 cmsq), mod pHTN  -Had cath 2022-Non obstructive CAD  - On home Torsemide 20 mg daily, Eplerenone 25 mg daily, and Toprol  mg daily  - Compliant with all meds including Torsemide, though, dose was reduced to  40mg once daily.   -proBNP 51K from 80k.       # Acute kidney injury superimposed on CKD.   ·  Plan: Creatinine Trend: 2.58<--, 2.10<--, 2.20<--, 2.20<--, 2.30<--, 2.40<--  Monitor       #LBBB  Known LBBB  Noted intermittent Wenkebach on telemetry

## 2024-04-13 NOTE — PROGRESS NOTE ADULT - PROBLEM SELECTOR PLAN 10
Hospital Bundle    Fluids: FLUID RESTRICT 1.5L/day  Electrolytes: Replete K > 4, Mg > 2, Phos > 3  Nutrition: Diet DASH CC 1.5L FLUID RESTRICT  PPX  ---VTE: HSQ  ---GI: c/w PPI  ---Resp: c/w home O2  Access: PIV  Code Status: pending further discussions  Dispo: pending clinical improvement

## 2024-04-13 NOTE — PROGRESS NOTE ADULT - PROBLEM SELECTOR PLAN 5
- will consult structural cards mod to severe AS    Plan:  -structural cards following - plan to obtain CTA heart, however iso DUNCAN, will consult nephro prior to obtaining imaging

## 2024-04-13 NOTE — PHYSICAL THERAPY INITIAL EVALUATION ADULT - PERTINENT HX OF CURRENT PROBLEM, REHAB EVAL
73F w HFrEF (EF 30%), mod AS, known LBBB, HTN, IDDM1, CKD, hypothyroidism, chronic lymphedema, suspected OHS/LELIA on 3L NC home O2 p/w 1 episode of syncope. Recent admission at [facility] (3/29-4/5) for ADHF and DUNCAN s/p diuresis, discharged with new home O2 3L NC suspecting OHS/LELIA pending outpatient w/u. Since discharge a week ago, she has been staying at home with   Pt had sob walking to car. Once in car, she was still breathing heavily. Partner noticed pt was not responding to verbal stimuli for 10-15sec and witnessed R arm jerking. Pt gained consciousness quickly without postictal symptoms. Pt went to Cardiologist Dr. Nathan who recommended pt to come to ED. No fever, cp, abd pain, n/v. Leg swelling is improved from prior. Pt on torsemide 40mg which she has been taking. Pt was discharged on 3LNC which she is currently on. Patient reports she did not take Farxiga that she was discharged on as she was concerned about side effects. XR chest (4/12) Trace bilateral pleural effusions and mild pulmonary edema.

## 2024-04-13 NOTE — CONSULT NOTE ADULT - SUBJECTIVE AND OBJECTIVE BOX
MR:- [id number] :  NAME:-EVELYN LOPEZ:-    DATE OF SERVICE:04-13-24 @ 08:10    Patient was seen,examined and evaluated  by Cuco Tubbs MD [id number] @ 08:10 .  ER evaluation, Labs and Hospital course was reviewed,         Covering for [facility] -Ozzie Kimble, Howard, Carlos Luong Savella, Cohen  Office Number: [phone]      CHIEF COMPLAINT:Syncope    HPI:HPI:  73F w HFrEF (EF 30%), mod AS, known LBBB, HTN, IDDM1, CKD, hypothyroidism, chronic lymphedema, suspected OHS/LELIA on 3L NC home O2 p/w 1 episode of syncope. Recent admission at [facility] (3/29-4/5) for ADHF and DUNCAN s/p diuresis, discharged with new home O2 3L NC suspecting OHS/LELIA pending outpatient w/u. Since discharge a week ago, she has been staying at home with   Pt had sob walking to car. Once in car, she was still breathing heavily. Partner noticed pt was not responding to verbal stimuli for 10-15sec and witnessed R arm jerking. Pt gained consciousness quickly without postictal symptoms. Pt went to Cardiologist Dr. Nathan who recommended pt to come to ED. No fever, cp, abd pain, n/v. Leg swelling is improved from prior. Pt on torsemide 40mg which she has been taking. Pt was discharged on 3LNC which she is currently on. Patient reports she did not take Farxiga that she was discharged on as she was concerned about side effects.     In ED, patient was found afebrile, hemodynamically stable, breathing well on 3L NC. Initial labs notable for mild hyponatremia, DUNCAN on CKD, elevated proBNP and elevated pCO2 both improved from prior. (12 Apr 2024 16:31)              CARDIAC HISTORY:  [ ] CAD [ [PCI [ ] CABG [ ] Prior Cath  [ ] Atrial Fibrillation  Devices[ ] PPM [ ] ICD [ ]ILR  Heart Failure [x ] HFrEF [ ] HFpEF    PAST MEDICAL & SURGICAL HISTORY:  Hypertension      Diabetes      Lymphedema      H/O left bundle branch block      History of left bundle branch block (LBBB)      Systolic heart failure, chronic      Type 1 diabetes      H/O cataract  2020      History of surgical removal of meniscus of knee  left in 1971      Frozen shoulder  2000      H/O Achilles tendon repair  lengthened bilaterally, 2000      Fractured skull  1968          MEDICATIONS  (STANDING):  aspirin  chewable 81 milliGRAM(s) Oral daily  atorvastatin 80 milliGRAM(s) Oral at bedtime  ferrous    sulfate 325 milliGRAM(s) Oral two times a day  glucagon  Injectable 1 milliGRAM(s) IntraMuscular once  heparin   Injectable 5000 Unit(s) SubCutaneous every 8 hours  insulin glargine Injectable (LANTUS) 20 Unit(s) SubCutaneous at bedtime  insulin lispro (ADMELOG) corrective regimen sliding scale   SubCutaneous at bedtime  insulin lispro (ADMELOG) corrective regimen sliding scale   SubCutaneous three times a day before meals  levothyroxine 75 MICROGram(s) Oral daily  metoprolol succinate  milliGRAM(s) Oral daily  pantoprazole    Tablet 40 milliGRAM(s) Oral before breakfast  torsemide 40 milliGRAM(s) Oral daily    MEDICATIONS  (PRN):  dextrose Oral Gel 15 Gram(s) Oral once PRN Blood Glucose LESS THAN 70 milliGRAM(s)/deciliter      FAMILY HISTORY:  Family history of CVA  mom, age 57      No family history of premature coronary artery disease or sudden cardiac death    SOCIAL HISTORY:  Smoking-[ ] Active  [ ] Former [ x] Non Smoker  Alcohol-[x ] Denies [ ] Social [ ] Daily  Ilicit Drug use-[x ] Denies [ ] Active user    REVIEW OF SYSTEMS:  Constitutional: [ ] fever, [ ]weight loss, [x ]fatigue   Activity [ ] Bedbound,[ x] Ambulates [ ] Unassisted[ x] Cane/Walker [ ] Assistence.  Effort tolerance:[ ] Excellent [ ] Good [ ] Fair [ ] Poor [x ]  Eyes: [ ] visual changes  Respiratory: [ ]shortness of breath;  [ ] cough, [ ]wheezing, [ ]chills, [ ]hemoptysis  Cardiovascular: [ ] chest pain, [ ]palpitations, [ ]dizziness,  [ ]leg swelling[ ]orthopnea [ ]PND  Gastrointestinal: [ ] abdominal pain, [ ]nausea, [ ]vomiting,  [ ]diarrhea,[ ]constipation  Genitourinary: [ ] dysuria, [ ] hematuria  Neurologic: [ ] headaches [ ] tremors[ ] weakness  Skin: [ ] itching, [ ]burning, [ ] rashes  Endocrine: [ ] heat or cold intolerance  Musculoskeletal: [ ] joint pain or swelling; [ ] muscle, back, or extremity pain  Psychiatric: [ ] depression, [ ]anxiety, [ ]mood swings, or [ ]difficulty sleeping  Hematologic: [ ] easy bruising, [ ] bleeding gums       [ x] All others negative	  [ ] Unable to obtain    Vital Signs Last 24 Hrs  T(C): 36.5 (13 Apr 2024 04:11), Max: 36.5 (13 Apr 2024 00:14)  T(F): 97.7 (13 Apr 2024 04:11), Max: 97.7 (13 Apr 2024 00:14)  HR: 61 (13 Apr 2024 04:11) (57 - 116)  BP: 103/53 (13 Apr 2024 04:11) (102/51 - 126/65)  RR: 18 (13 Apr 2024 04:11) (16 - 20)  SpO2: 99% (13 Apr 2024 04:11) (87% - 100%)    Parameters below as of 13 Apr 2024 04:11  Patient On (Oxygen Delivery Method): nasal cannula  O2 Flow (L/min): 3    I&O's Summary    12 Apr 2024 07:01  -  13 Apr 2024 07:00  --------------------------------------------------------  IN: 0 mL / OUT: 500 mL / NET: -500 mL        PHYSICAL EXAM:  General: No acute distress BMI-28  HEENT: EOMI, PERRL[ ] Icteric  Neck: Supple, [ ] JVD  Lungs: Equal air entry bilaterally; [ ] Rales [ ] Rhonchi [ ] Wheezing  Heart: Regular rate and rhythm;[x ] Murmurs-   2/6 [x ] Systolic [ ] Diastolic [ ] Radiation,No rubs, or gallops  Abdomen: Nontender, bowel sounds present  Extremities: No clubbing, cyanosis, [x ] edema[ ] Calf tenderness  Nervous system:  Alert & Oriented X3, no focal deficits  Psychiatric: Normal affect  Skin: No rashes or lesions      LABS:  04-12    132<L>  |  93<L>  |  61<H>  ----------------------------<  194<H>  4.7   |  24  |  2.58<H>    Ca    8.8      12 Apr 2024 14:23    TPro  7.8  /  Alb  3.6  /  TBili  0.7  /  DBili  x   /  AST  18  /  ALT  6<L>  /  AlkPhos  76  04-12    Creatinine Trend: 2.58<--, 2.10<--, 2.20<--, 2.20<--, 2.30<--, 2.40<--                        10.0   4.89  )-----------( 175      ( 13 Apr 2024 07:05 )             33.7     PT/INR - ( 12 Apr 2024 14:23 )   PT: 11.8 sec;   INR: 1.13 ratio         PTT - ( 12 Apr 2024 14:23 )  PTT:28.3 sec    TTE W or WO Ultrasound Enhancing Agent (04.01.24 @ 10:59) >     CONCLUSIONS:      1. Technically difficult image quality.   2. Left ventricular systolic function is moderately to severely decreased with an ejection fraction of 30 % by Nunes's method of disks.   3. Mildto moderate left ventricular hypertrophy.   4. The right ventricle is not well visualized. Based on visual assessment, the right ventricle appears mildly enlarged. mildly reduced systolic function.   5. The left atrium is mildly dilated.   6. Trace aortic regurgitation.   7. Mild mitral regurgitation.   8. Moderate to severe aortic stenosis. There is a Vmax of 3.66m/s, Mean gradient of 26mmHg and a DI of 0.19. The JUAN DANIEL is calculated as 0.61cm2 by continuity. This likely represents low flow low gradient AS.   9. Estimated pulmonary artery systolic pressure is 59 mmHg, consistent with moderate pulmonary hypertension.  10. The inferior vena cava is dilated measuring 2.60 cm in diameter, (dilated >2.1cm) with normal inspiratory collapse (normal >50%) consistent with mildly elevated right atrial pressure (~8, range 5-10mmHg).        12 Lead ECG (04.12.24 @ 12:48) >  SINUS RHYTHM WITH 1ST DEGREE A-V BLOCK  LEFT AXIS DEVIATION  LEFT BUNDLE BRANCH BLOCK  ABNORMAL ECG     Xray Chest 1 View- PORTABLE-Urgent (Xray Chest 1 View- PORTABLE-Urgent .) (04.12.24 @ 14:43) >  IMPRESSION: Trace bilateral pleural effusions and mild pulmonary edema.      Cardiac Catheterization (07.27.22 @ 14:43) >  Coronary Angiography   The coronary circulation is right dominant.      LM   Left main artery: The segment is visually normal in size and structure. Angiography shows no disease.    LAD   Left anterior descending artery: Angiography shows minor irregularities. First diagonal: Angiography shows minor irregularities.    CX   Circumflex: Angiography shows minor irregularities. First obtuse marginal: Angiography shows minor irregularities.    RCA   Right coronary artery: The segment is large, dominant. Angiography shows minor irregularities.

## 2024-04-13 NOTE — PROGRESS NOTE ADULT - PROBLEM SELECTOR PLAN 1
-Suspect cardiogenic given recent diuresis, addition of GDMT, pre-existing AS  -With arm shaking     - ctm on telemetry  - CT Head  - f/u carotid US  - f/u TSH/T4  -Structural cardiology consult  -Orthostatics -Suspect cardiogenic given recent diuresis, addition of GDMT, pre-existing AS  -With arm shaking   -Orthostatics wnl,     Plan:  - ctm on telemetry  - CT Head  - f/u carotid US  -Structural cardiology consulted

## 2024-04-13 NOTE — PHYSICAL THERAPY INITIAL EVALUATION ADULT - NSPTDISCHREC_GEN_A_CORE
TBd once fxl eval can be completed TBD pending further ambulation TBD, anticipate HPT If pt d/c home would require home PT, assist with all mobility/ADLs, & DME: 3:1 commode due to pt confined to single room without bathroom/Sub-acute Rehab

## 2024-04-13 NOTE — PROGRESS NOTE ADULT - PROBLEM SELECTOR PLAN 9
Hospital Bundle    Fluids: FLUID RESTRICT 1.5L/day  Electrolytes: Replete K > 4, Mg > 2, Phos > 3  Nutrition: Diet DASH CC 1.5L FLUID RESTRICT  PPX  ---VTE: HSQ  ---GI: c/w PPI  ---Resp: c/w home O2  Access: PIV  Code Status: pending further discussions  Dispo: pending clinical improvement + u/a for bacteria and WBC, however asymptomatic   -HDS, afebrile, wihtout leukocytosis.    Plan:  CTM

## 2024-04-13 NOTE — PHYSICAL THERAPY INITIAL EVALUATION ADULT - REFERRING PHYSICIAN, REHAB EVAL
Anitra Morgan MD ORDER PT EVAL and TREAT , spoke with rn John prior to session instruct to perform bed eval only t with episode in am 6:08 HR 39 w/ 2:1 AVB sustained in 30's while sleep

## 2024-04-13 NOTE — PROGRESS NOTE ADULT - PROBLEM SELECTOR PLAN 6
Erythematous appearing but pt states improved from prior? Chronic, possible infection though no systemic signs.     - ctm  - elevate legs as tolerated, encourage ambulation  - compression stockings, ace wrapping

## 2024-04-13 NOTE — PHYSICAL THERAPY INITIAL EVALUATION ADULT - ADDITIONAL COMMENTS
pt lives in house with significant other at b/s ; there are 2 steps to enter with HR no steps inside to negotiate ; pt used quad cane PTA to amb , does own a rolling walker , transport w/c , motorized scooter if need to walk for awhile , shower chair , 1 grab , Home O2 ; pt independent with fxl activity and adl's ; has tub shower , significant other can assist if need

## 2024-04-13 NOTE — PROGRESS NOTE ADULT - PROBLEM SELECTOR PLAN 7
home lantus 33u qhs    - c/w home Lantus 20u QHS (previously on while inpt), QUE, FSG ACHS or q6h if NPO; goal glucose   - CTM Dexcom for glucose monitoring

## 2024-04-13 NOTE — PROGRESS NOTE ADULT - PROBLEM SELECTOR PLAN 3
Unclear baseline SCr likely 2.1-2.2    - f/u urine studies  - trend BUN/SCr, avoid nephrotoxic, renally dose all meds  - strict I/Os Unclear baseline SCr likely 2.1-2.2  Now with increasing Cr.   Fe 1.6, indeterminant       - trend BUN/SCr, avoid nephrotoxic, renally dose all meds  - strict I/Os  - Nephro consulted

## 2024-04-13 NOTE — PHYSICAL THERAPY INITIAL EVALUATION ADULT - IMPAIRMENTS FOUND, PT EVAL
aerobic capacity/endurance/joint integrity and mobility/muscle strength/sensory integrity aerobic capacity/endurance/gait, locomotion, and balance/joint integrity and mobility/muscle strength/sensory integrity

## 2024-04-13 NOTE — PROGRESS NOTE ADULT - ATTENDING COMMENTS
73F PMH HFrEF (LVEF 30%), moderate AS, HTN, DM1.5, CKD3, hypothyroidism, chronic lymphadema, presents with episode of syncope. Notably, patient was recently discharged from [place] about a week prior to admission. On day of admission, patient was with episode of shortness of breath, then sat down in car had had witnessed syncopal episode which lasted 10-15 seconds. No postictal state or incontinence.     #Syncope  - CT head noncon  - Carotid ultrasound   - Structural cardiology consult given results of most recent echo  - Orthostatics   - Continue to monitor on tele, may warrant Zio patch as patient previously had halter, however no events recorded    #Acute CHF  - Strict IO, daily standing weights  - Continue home torsemide for now   - monitor Cr closely. check UA, bladder scan    #Recheck TSH    Remainder as above .

## 2024-04-13 NOTE — PROGRESS NOTE ADULT - PROBLEM SELECTOR PLAN 2
TTE (4/2024) LVEF 30% w mod LVH, mildly reduced RV, AS (0.61 cm2), mod pHTN.   On home Torsemide 40 mg daily, Toprol  mg daily, Farxiga 5mg daily. Follows w Dr. Cherise rosas eventual plan to start further GDMT as renally tolerated  proBNP 51K from 80k.     - strict I/Os, daily weights, fluid restrict 1.5L/day  - c/w home torsemide 40mg daily

## 2024-04-14 NOTE — DIETITIAN INITIAL EVALUATION ADULT - PROBLEM SELECTOR PLAN 3
Unclear baseline SCr likelty 2.1-2.2    - f/u urine studies  - trend BUN/SCr, avoid nephrotoxic, renally dose all meds  - strict I/Os

## 2024-04-14 NOTE — PROGRESS NOTE ADULT - ASSESSMENT
73F w HFrEF (EF 30%), mod AS, known LBBB, HTN, IDDM1, CKD, hypothyroidism, chronic lymphedema, p/w 1 episode of syncope with R arm jerking.         #Syncope-Aortic Stenosis  -Episode after exertion  Known Aortic Stenosis likely Severe in setting of decreased LVEF  Structual Heart evaluation      #Chronic Systolic HF (EF 38%), mod AS, HTN, LBBB, Lymphedema   - Has known systolic HF and mod AS.    - Repeat TTE showed EF 30%, mild-mod LVH, mildly reduced RVF, mod-severe AS (.61 cmsq), mod pHTN  - Had cath 2022-Non obstructive CAD  - On home Torsemide 20 mg daily, Eplerenone 25 mg daily, and Toprol  mg daily  - Compliant with all meds including Torsemide, though, dose was reduced to Furosemide  40mg once daily.   -proBNP 51K from 80k.   - Consideration for TAVR      # Acute kidney injury superimposed on CKD.   ·  Plan: Creatinine Trend: 2.58<--, 2.10<--, 2.20<--, 2.20<--, 2.30<--, 2.40<--  Monitor       #LBBB  Known LBBB  Noted intermittent Wenkebach on telemetry with bradycardia and 2:1 AV conduction  -EP evaluation  -Cut back on BBlockers will switch to [place]

## 2024-04-14 NOTE — DIETITIAN INITIAL EVALUATION ADULT - OTHER INFO
Patient reports UBW 320lb, reports recent weight loss in setting of hospitalizations, unsure of amount lost.  Dosing weight: 330lb (4/12).  Daily weights: 342.3lb (4/13), 341.2lb (4/14).  IBW+10% 182.6lb, %IBW: 187% based on most recent daily weight.  Weight history per [facility]: 330.1lb (3/29/24), 320lb (1/19/24), 319.9lb (8/11/23), 306lb (5/31/23), 311lb (12/6/22), 380lb (10/2/21).  Weight appears uptrending since admit, of note patient currently with 2+ 3+ and 4+ edema. Weight fluctuations likely in setting of fluid shifts. RD to continue to monitor weight trends as available/able.     -Cardiology following for CHF.  -CKD4 per Nephrology. Low sodium noted noted. Potassium and phosphorus WDL.  -Ordered for bumex.  -Ordered for oral synthroid.  -Ordered for ferrous sulfate.  -Current insulin regimen in-house: Correctional sliding scale insulin, lantus 25 units at bedtime. Elevated Finger Sticks noted.

## 2024-04-14 NOTE — DIETITIAN INITIAL EVALUATION ADULT - OTHER CALCULATIONS
Defer fluid needs to team.  Estimated nutrient needs based on dosing weight 330lb for energy and IBW+10% 182.6lb for protein, with consideration for BMI and CKD4

## 2024-04-14 NOTE — PROGRESS NOTE ADULT - SUBJECTIVE AND OBJECTIVE BOX
MR#[id number]  PATIENT NAME:JOHNATHAN LOPEZ    DATE OF SERVICE: 04-14-24 @ 07:06  Patient was seen and examined by Cuco Tubbs MD on    04-14-24 @ 07:06 .  Interim events noted.Consultant notes ,Labs,Telemetry reviewed by me         Covering for [facility] -Howard Sterling, Carlos Luong, Herman Alston  Office Number: [phone]         HOSPITAL COURSE: HPI:  73F w HFrEF (EF 30%), mod AS, known LBBB, HTN, IDDM1, CKD, hypothyroidism, chronic lymphedema, suspected OHS/LELIA on 3L NC home O2 p/w 1 episode of syncope. Recent admission at [facility] (3/29-4/5) for ADHF and DUNCAN s/p diuresis, discharged with new home O2 3L NC suspecting OHS/LELIA pending outpatient w/u. Since discharge a week ago, she has been staying at home with   Pt had sob walking to car. Once in car, she was still breathing heavily. Partner noticed pt was not responding to verbal stimuli for 10-15sec and witnessed R arm jerking. Pt gained consciousness quickly without postictal symptoms. Pt went to Cardiologist Dr. Nathan who recommended pt to come to ED. No fever, cp, abd pain, n/v. Leg swelling is improved from prior. Pt on torsemide 40mg which she has been taking. Pt was discharged on 3LNC which she is currently on. Patient reports she did not take Farxiga that she was discharged on as she was concerned about side effects.     In ED, patient was found afebrile, hemodynamically stable, breathing well on 3L NC. Initial labs notable for mild hyponatremia, DUNCAN on CKD, elevated proBNP and elevated pCO2 both improved from prior. (12 Apr 2024 16:31)          INTERIM EVENTS:Patient seen at bedside ,interim events noted.Awake alert lying in bed noted elevated Creat remains in Sinus rhythm but with episodes bradycardia-Wenkebach and 2:1 AV conduction.      PMH -reviewed admission note, no change since admission  HEART FAILURE: Acute[x ]Chronic[x ] Systolic[x ] Diastolic[ ] Combined Systolic and Diastolic[ ]  CAD[ ] CABG[ ] PCI[ ]  DEVICES[ ] PPM[ ] ICD[ ] ILR[ ]  ATRIAL FIBRILLATION[ ] Paroxysmal[ ] Permanent[ ] CHADS2-[  ]  DUNCAN[ ] CKD1[ ] CKD2[ ] CKD3[ ] CKD4[x ] ESRD[ ]  COPD[ ] HTN[x ]   DM[ ] Type1[ ] Type 2[ ]   CVA[ ] Paresis[ ]    AMBULATION: Assisted[ ] Cane/walker[x ] Independent[ ]    MEDICATIONS  (STANDING):  aspirin  chewable 81 milliGRAM(s) Oral daily  atorvastatin 80 milliGRAM(s) Oral at bedtime  ferrous    sulfate 325 milliGRAM(s) Oral two times a day  glucagon  Injectable 1 milliGRAM(s) IntraMuscular once  heparin   Injectable 5000 Unit(s) SubCutaneous every 8 hours  insulin glargine Injectable (LANTUS) 20 Unit(s) SubCutaneous at bedtime  insulin lispro (ADMELOG) corrective regimen sliding scale   SubCutaneous at bedtime  insulin lispro (ADMELOG) corrective regimen sliding scale   SubCutaneous three times a day before meals  levothyroxine 75 MICROGram(s) Oral daily  metoprolol succinate  milliGRAM(s) Oral daily  pantoprazole    Tablet 40 milliGRAM(s) Oral before breakfast  torsemide 40 milliGRAM(s) Oral daily    MEDICATIONS  (PRN):  dextrose Oral Gel 15 Gram(s) Oral once PRN Blood Glucose LESS THAN 70 milliGRAM(s)/deciliter            REVIEW OF SYSTEMS:  Constitutional: [ ] fever, [ ]weight loss,  [x ]fatigue [ ]weight gain  Eyes: [ ] visual changes  Respiratory: [ ]shortness of breath;  [ ] cough, [ ]wheezing, [ ]chills, [ ]hemoptysis  Cardiovascular: [ ] chest pain, [ ]palpitations, [ ]dizziness,  [ ]leg swelling[ ]orthopnea[ ]PND  Gastrointestinal: [ ] abdominal pain, [ ]nausea, [ ]vomiting,  [ ]diarrhea [ ]Constipation [ ]Melena  Genitourinary: [ ] dysuria, [ ] hematuria [ ]De [place] Garza  Neurologic: [ ] headaches [ ] tremors[ ]weakness [ ]Paralysis Right[ ] Left[ ]  Skin: [ ] itching, [ ]burning, [ ] rashes  Endocrine: [ ] heat or cold intolerance  Musculoskeletal: [ ] joint pain or swelling; [ ] muscle, back, or extremity pain  Psychiatric: [ ] depression, [ ]anxiety, [ ]mood swings, or [ ]difficulty sleeping  Hematologic: [ ] easy bruising, [ ] bleeding gums    [ ] All remaining systems negative except as per above.   [ ]Unable to obtain.  [x] No change in ROS since admission      Vital Signs Last 24 Hrs  T(C): 36.7 (14 Apr 2024 04:25), Max: 36.8 (13 Apr 2024 20:48)  T(F): 98 (14 Apr 2024 04:25), Max: 98.2 (13 Apr 2024 20:48)  HR: 39 (14 Apr 2024 06:08) (39 - 60)  BP: 101/64 (14 Apr 2024 06:08) (101/64 - 110/62)  RR: 17 (14 Apr 2024 06:08) (17 - 18)  SpO2: 99% (14 Apr 2024 06:08) (95% - 100%)    Parameters below as of 14 Apr 2024 06:08  Patient On (Oxygen Delivery Method): nasal cannula  O2 Flow (L/min): 3    I&O's Summary    13 Apr 2024 07:01  -  14 Apr 2024 07:00  --------------------------------------------------------  IN: 840 mL / OUT: 1400 mL / NET: -560 mL        PHYSICAL EXAM:  General: No acute distress BMI-32  HEENT: EOMI, PERRL  Neck: Supple, [ ] JVD  Lungs: Equal air entry bilaterally; [ ] rales [ ] wheezing [ ] rhonchi  Heart: Regular rate and rhythm; [x ] murmur   3/6 [ x] systolic [ ] diastolic [x ] radiation[ ] rubs [ ]  gallops  Abdomen: Nontender, bowel sounds present  Extremities: No clubbing, cyanosis, [x ] lymphedema [ ]Pulses  equal and intact  Nervous system:  Alert & Oriented X3, no focal deficits  Psychiatric: Normal affect  Skin: No rashes or lesions    LABS:  04-13    135  |  95<L>  |  65<H>  ----------------------------<  137<H>  4.3   |  26  |  2.66<H>    Ca    8.5      13 Apr 2024 07:05  Phos  4.1     04-13  Mg     2.4     04-13    TPro  6.9  /  Alb  3.2<L>  /  TBili  0.5  /  DBili  x   /  AST  15  /  ALT  8<L>  /  AlkPhos  65  04-13    Creatinine Trend: 2.66<--, 2.58<--, 2.10<--, 2.20<--, 2.20<--, 2.30<--                        10.0   4.89  )-----------( 175      ( 13 Apr 2024 07:05 )             33.7     PT/INR - ( 12 Apr 2024 14:23 )   PT: 11.8 sec;   INR: 1.13 ratio    PTT - ( 12 Apr 2024 14:23 )  PTT:28.3 sec     12 Lead ECG (04.12.24 @ 22:49) >   SINUS BRADYCARDIA WITH 1ST DEGREE A-V BLOCK  LEFT AXIS DEVIATION  LEFT BUNDLE BRANCH BLOCK  ABNORMAL ECG        TTE W or WO Ultrasound Enhancing Agent (04.01.24 @ 10:59) >     CONCLUSIONS:      1. Technically difficult image quality.   2. Left ventricular systolic function is moderately to severely decreased with an ejection fraction of 30 % by Nunes's method of disks.   3. Mildto moderate left ventricular hypertrophy.   4. The right ventricle is not well visualized. Based on visual assessment, the right ventricle appears mildly enlarged. mildly reduced systolic function.   5. The left atrium is mildly dilated.   6. Trace aortic regurgitation.   7. Mild mitral regurgitation.   8. Moderate to severe aortic stenosis. There is a Vmax of 3.66m/s, Mean gradient of 26mmHg and a DI of 0.19. The JUAN DANIEL is calculated as 0.61cm2 by continuity. This likely represents low flow low gradient AS.   9. Estimated pulmonary artery systolic pressure is 59 mmHg, consistent with moderate pulmonary hypertension.  10. The inferior vena cava is dilated measuring 2.60 cm in diameter, (dilated >2.1cm) with normal inspiratory collapse (normal >50%) consistent with mildly elevated right atrial pressure (~8, range 5-10mmHg).      Cardiac Catheterization (07.27.22 @ 14:43) >  Coronary Angiography   The coronary circulation is right dominant.      LM   Left main artery: The segment is visually normal in size and structure. Angiography shows no disease.    LAD   Left anterior descending artery: Angiography shows minor irregularities. First diagonal: Angiography shows minor irregularities.    CX   Circumflex: Angiography shows minor irregularities. First obtuse marginal: Angiography shows minor irregularities.    RCA   Right coronary artery: The segment is large, dominant. Angiography shows minor irregularities.

## 2024-04-14 NOTE — DIETITIAN INITIAL EVALUATION ADULT - PERSON TAUGHT/METHOD
Discussed nutrition education for Type 1 DM: consuming consistent meals everyday, monitoring intake of concentrated sweets/drinks, pairing carbohydrates with protein, what to do when blood glucose levels are low, and encouraged patient to continue with monitoring blood glucose levels and staying compliant with prescribed insulin regimen. Patient made aware RD to remain available.   Patient was visited by RD for CHF education. Heart failure education provided to the patient in detail. Discussed heart failure nutrition therapy, sodium and fluid intake, importance of diet adherence, daily weights monitoring with the patient. Reinforced importance of weight gain parameters and importance of contacting MD’s about weight changes. Provided handouts on heart failure nutrition therapy, reading heart healthy nutrition labels, heart healthy shopping tips and sodium (salt) content of foods. Patient verbalized understanding demonstrated by teach back method.  Pt/family made aware of menu ordering procedure in house w/ menu provided, encourage pt/family to order preferred foods to optimize PO intake while in house./verbal instruction/written material/patient instructed

## 2024-04-14 NOTE — PROGRESS NOTE ADULT - PROBLEM SELECTOR PLAN 5
mod to severe AS    Plan:  -structural cards following - plan to obtain CTA heart, however iso DUNCAN, will consult nephro prior to obtaining imaging

## 2024-04-14 NOTE — PROGRESS NOTE ADULT - ATTENDING COMMENTS
73F PMH HFrEF (LVEF 30%), moderate AS, HTN, DM1.5, CKD3, hypothyroidism, chronic lymphadema, presents with episode of syncope. Notably, patient was recently discharged from [place] about a week prior to admission. On day of admission, patient was with episode of shortness of breath, then sat down in car had had witnessed syncopal episode which lasted 10-15 seconds. No postictal state or incontinence.     #Syncope  - CT head noncon  - Carotid ultrasound   - Structural cardiology consult given results of most recent echo  - Orthostatics   - Continue to monitor on tele, may warrant Zio patch as patient previously had halter, however no events recorded    #Acute CHF  - Strict IO, daily standing weights  - switched to bumex gtt per cardiology recs. monitor bmp q12h  - monitor Cr closely. check UA, bladder scan    #Hyperglycemia, DMT2, uptitrate lantus to 25u hs and reasses    #Recheck TSH

## 2024-04-14 NOTE — DIETITIAN INITIAL EVALUATION ADULT - PERTINENT LABORATORY DATA
2 04-14    133<L>  |  93<L>  |  66<H>  ----------------------------<  154<H>  4.3   |  29  |  2.72<H>    Ca    8.3<L>      14 Apr 2024 07:17  Phos  4.3     04-14  Mg     2.5     04-14    TPro  6.9  /  Alb  3.2<L>  /  TBili  0.5  /  DBili  x   /  AST  13  /  ALT  7<L>  /  AlkPhos  64  04-14    POCT Blood Glucose.: 200 mg/dL (14 Apr 2024 13:06)  POCT Blood Glucose.: 142 mg/dL (14 Apr 2024 08:51)  POCT Blood Glucose.: 233 mg/dL (13 Apr 2024 21:23)  POCT Blood Glucose.: 190 mg/dL (13 Apr 2024 17:50)    A1C with Estimated Average Glucose Result: 7.4 % (03-30-24 @ 08:21)  A1C with Estimated Average Glucose Result: 6.8 % (01-10-24 @ 08:37)  A1C with Estimated Average Glucose Result: 7.3 % (08-12-23 @ 07:58)

## 2024-04-14 NOTE — PROVIDER CONTACT NOTE (OTHER) - ASSESSMENT
Patient asleep during the episode.Vitals BP-107/63, heart rate 55/mt, O2 sat-100% on 3 L/NC.Deneis chest pain/discomfort/dizziness and SOB.Patient otherwise sustaining in the 40's. Patient asleep during the episode.Vitals BP-107/63, heart rate 55/mt, O2 sat-100% on 3 L/NC.Deneis chest pain/discomfort/dizziness and SOB.Patient otherwise sustaining in the 40's to 50's.

## 2024-04-14 NOTE — PROGRESS NOTE ADULT - TIME BILLING
- Review of records, telemetry, vital signs and daily labs.   - General and cardiovascular physical examination.  - Generation of cardiovascular treatment plan.  - Coordination of care.      Patient was seen and examined by me on  04/14/2024,interim events noted,labs and radiology studies reviewed.  Cuco Tubbs MD,FACC.  [address] [place], [place] [id number].  [phone]

## 2024-04-14 NOTE — DIETITIAN INITIAL EVALUATION ADULT - PROBLEM/PLAN-5
Patient:   WOLFGANG POWELL            MRN: [id number]            FIN: [id number]              Age:   27 years     Sex:  FEMALE     :  93   Associated Diagnoses:   None   Author:   SHANDA AL       Spontaneous Vaginal Delivery Note  Date: 5/10/2020  Preoperative Diagnosis:  1. Single Intrauterine Pregnancy  Postoperative Diagnosis:   1. Single Intrauterine Pregnancy  Procedure: Spontaneous Vaginal Delivery  Resident: Shanda Alvarado PGY1  Attending: Dr. Stout  Anesthesia: lidocaine 1%  Episiotomy: none  Extension/Laceration: first degree perineal, periurethral  Estimated Blood Loss: 200 cc  Findings:   Infant: Gender: male, Presentation: IVIS  Apgars: 9 at 1 minute, 9 at 5 minutes  Weight: pending  Normal appearing placenta with 3 vessel cord.   Neonatology present at delivery: yes  Delivery Comments:  Called to room for patient complete and pushing. The patient was prepped and draped in dorsal lithotomy position in the standard fashion. The head was delivered over an intact perineum with no nuchal cord. The anterior shoulder delivered easily with maternal effort and gentle downward pressure. The posterior shoulder followed easily. The infant’s mouth and nose were bulb suctioned and the cord clamped times two and cut. The infant was then  placed on the mother’s abdomen. The placenta was delivered intact with a 3 vessel cord noted. The uterus was firm with massage. The first degree perineal and first degree periurethral laceration were repaired in standard fashion. Perineal hemostasis was noted following the delivery. Sponge and needle counts were correct times two.  Complications: none  Pathology: none  Disposition: Mother and baby were both doing well and bonding in the LDR following delivery.  Dr. Stout present at delivery.  Shanda Alvarado PGY1   DISPLAY PLAN FREE TEXT

## 2024-04-14 NOTE — DIETITIAN INITIAL EVALUATION ADULT - ENERGY INTAKE
Patient reports good, normal appetite and PO intake currently. RD observed patient eating salad at time of visit. % meal intake per flowsheets.

## 2024-04-14 NOTE — DIETITIAN INITIAL EVALUATION ADULT - NSFNSADHERENCEPTAFT_GEN_A_CORE
Patient with hx CHF, has not been monitoring daily weights recently in setting of hospitalizations, verbalizes knowledge of importance of monitoring daily weights.  Patient with hx T1DM, A1C 7.4% from 3/30/24 suggests fair glycemic control PTA. Patient reports blood glucose levels are checked 4x/day at home, usual blood glucose levels 150-180 mg/dL, has continuous glucose monitor. Reports compliance to prescribed insulin, self-injects; taking lantus 20 units daily per chart.

## 2024-04-14 NOTE — DIETITIAN INITIAL EVALUATION ADULT - PROBLEM SELECTOR PLAN 1
-Suspect cardiogenic given recent diuresis, addition of GDMT, pre-existing AS  -With arm shaking     - ctm on telemetry  - CT Head  - f/u carotid US  - f/u TSH/T4  -Structural cardiology consult  -Orthostatics

## 2024-04-14 NOTE — PROGRESS NOTE ADULT - PROBLEM SELECTOR PLAN 3
Unclear baseline SCr likely 2.1-2.2  Now with increasing Cr.   Fe 1.6, indeterminant       - trend BUN/SCr, avoid nephrotoxic, renally dose all meds  - strict I/Os  - Nephro consulted

## 2024-04-14 NOTE — DIETITIAN INITIAL EVALUATION ADULT - ORAL INTAKE PTA/DIET HISTORY
Patient reports good, normal appetite and PO intake at home PTA. Monitors intake of salt and sugar at home. Confirms no known food allergies.

## 2024-04-14 NOTE — DIETITIAN INITIAL EVALUATION ADULT - NS FNS DIET ORDER
Diet, Regular:   Consistent Carbohydrate {Evening Snack} (CSTCHOSN)  DASH/TLC {Sodium & Cholesterol Restricted} (DASH)  1500mL Fluid Restriction ([id number]) (04-12-24 @ 18:32) [Active]

## 2024-04-14 NOTE — PROGRESS NOTE ADULT - SUBJECTIVE AND OBJECTIVE BOX
[facility]   [phone]  RENAL FOLLOW UP NOTE  --------------------------------------------------------------------------------  Chief Complaint:    24 hour events/subjective:    seen earlier   denies worsening sob   c/o edema   her partner in attendance    PAST HISTORY  --------------------------------------------------------------------------------  No significant changes to PMH, PSH, FHx, SHx, unless otherwise noted    ALLERGIES & MEDICATIONS  --------------------------------------------------------------------------------  Allergies    Ativan (Rash; Urticaria)  penicillins (Hives)    Intolerances      Standing Inpatient Medications  aspirin  chewable 81 milliGRAM(s) Oral daily  atorvastatin 80 milliGRAM(s) Oral at bedtime  buMETAnide Infusion 0.5 mG/Hr IV Continuous <Continuous>  dextrose 10% Bolus 125 milliLiter(s) IV Bolus once  dextrose 5%. 1000 milliLiter(s) IV Continuous <Continuous>  dextrose 5%. 1000 milliLiter(s) IV Continuous <Continuous>  dextrose 50% Injectable 12.5 Gram(s) IV Push once  dextrose 50% Injectable 25 Gram(s) IV Push once  ferrous    sulfate 325 milliGRAM(s) Oral two times a day  glucagon  Injectable 1 milliGRAM(s) IntraMuscular once  heparin   Injectable 5000 Unit(s) SubCutaneous every 8 hours  insulin glargine Injectable (LANTUS) 25 Unit(s) SubCutaneous at bedtime  insulin lispro (ADMELOG) corrective regimen sliding scale   SubCutaneous three times a day before meals  insulin lispro (ADMELOG) corrective regimen sliding scale   SubCutaneous at bedtime  levothyroxine 75 MICROGram(s) Oral daily  pantoprazole    Tablet 40 milliGRAM(s) Oral before breakfast    PRN Inpatient Medications  dextrose Oral Gel 15 Gram(s) Oral once PRN      REVIEW OF SYSTEMS  --------------------------------------------------------------------------------    Gen: denies  fevers/chills,  CVS: denies chest pain/palpitations  Resp: denies SOB/Cough  GI: Denies N/V/Abd pain  : Denies dysuria    VITALS/PHYSICAL EXAM  --------------------------------------------------------------------------------  T(C): 36.4 (04-14-24 @ 12:04), Max: 36.8 (04-13-24 @ 20:48)  HR: 57 (04-14-24 @ 12:10) (39 - 60)  BP: 108/64 (04-14-24 @ 12:10) (101/64 - 108/64)  RR: 18 (04-14-24 @ 12:10) (17 - 18)  SpO2: 99% (04-14-24 @ 12:10) (99% - 100%)  Wt(kg): --        04-13-24 @ 07:01  -  04-14-24 @ 07:00  --------------------------------------------------------  IN: 840 mL / OUT: 1400 mL / NET: -560 mL    04-14-24 @ 07:01  -  04-14-24 @ 15:30  --------------------------------------------------------  IN: 480 mL / OUT: 0 mL / NET: 480 mL      Physical Exam:  	    	Gen: Non toxic comfortable appearing  	no jvd   	Pulm: decrease bs  no rales or ronchi or wheezing  	CV: RRR, S1S2; no rub II/VI M   	Abd: +BS, soft, nontender/nondistended obese   	: No suprapubic tenderness  	UE: Warm, no cyanosis  no clubbing,  no edema  	LE: Warm, no cyanosis  no clubbing, 4+  edema  	Neuro: alert and oriented. speech coherent       LABS/STUDIES  --------------------------------------------------------------------------------              10.0   5.03  >-----------<  186      [04-14-24 @ 07:18]              33.3     133  |  93  |  66  ----------------------------<  154      [04-14-24 @ 07:17]  4.3   |  29  |  2.72        Ca     8.3     [04-14-24 @ 07:17]      Mg     2.5     [04-14-24 @ 07:17]      Phos  4.3     [04-14-24 @ 07:17]    TPro  6.9  /  Alb  3.2  /  TBili  0.5  /  DBili  x   /  AST  13  /  ALT  7   /  AlkPhos  64  [04-14-24 @ 07:17]          Creatinine Trend:  SCr 2.72 [04-14 @ 07:17]  SCr 2.66 [04-13 @ 07:05]  SCr 2.58 [04-12 @ 14:23]  SCr 2.10 [04-05 @ 05:50]  SCr 2.20 [04-04 @ 07:30]              Urinalysis - [04-14-24 @ 07:17]      Color  / Appearance  / SG  / pH       Gluc 154 / Ketone   / Bili  / Urobili        Blood  / Protein  / Leuk Est  / Nitrite       RBC  / WBC  / Hyaline  / Gran  / Sq Epi  / Non Sq Epi  / Bacteria     Urinalysis - [04-14-24 @ 07:17]      Color  / Appearance  / SG  / pH       Gluc 154 / Ketone   / Bili  / Urobili        Blood  / Protein  / Leuk Est  / Nitrite       RBC  / WBC  / Hyaline  / Gran  / Sq Epi  / Non Sq Epi  / Bacteria     Urine Creatinine 34      [04-12-24 @ 16:51]  Urine Protein 8      [04-12-24 @ 16:51]  Urine Sodium 27      [04-12-24 @ 16:51]  Urine Urea Nitrogen 243      [04-12-24 @ 18:07]  Urine Potassium 29      [04-12-24 @ 16:51]  Urine Osmolality 207      [04-12-24 @ 16:51]    TSH 5.06      [04-14-24 @ 07:18]  Lipid: chol 74, TG 70, HDL 33, LDL --      [04-13-24 @ 07:05]

## 2024-04-14 NOTE — PROGRESS NOTE ADULT - SUBJECTIVE AND OBJECTIVE BOX
[facility]  [facility] PGY-3    PROGRESS NOTE:     Patient is a 73y old  Female who presents with a chief complaint of Syncope (14 Apr 2024 07:05)      SUBJECTIVE / OVERNIGHT EVENTS:    No acute events overnight. Patient examined at bedside with no acute complaints.     Pain:  Bowel Movements:  Urination:  OOB:  PT:    REVIEW OF SYSTEMS:    CONSTITUTIONAL: No weakness, fevers or chills  EYES/ENT: No visual changes;  No vertigo or throat pain   NECK: No pain or stiffness  RESPIRATORY: No cough, wheezing, hemoptysis; No shortness of breath  CARDIOVASCULAR: No chest pain or palpitations  GASTROINTESTINAL: No abdominal or epigastric pain. No nausea, vomiting, or hematemesis; No diarrhea or constipation. No melena or hematochezia.  GENITOURINARY: No dysuria, frequency or hematuria  NEUROLOGICAL: No numbness or weakness  SKIN: No itching, rashes      MEDICATIONS  (STANDING):  aspirin  chewable 81 milliGRAM(s) Oral daily  atorvastatin 80 milliGRAM(s) Oral at bedtime  buMETAnide Infusion 0.5 mG/Hr (2.5 mL/Hr) IV Continuous <Continuous>  dextrose 10% Bolus 125 milliLiter(s) IV Bolus once  dextrose 5%. 1000 milliLiter(s) (100 mL/Hr) IV Continuous <Continuous>  dextrose 5%. 1000 milliLiter(s) (50 mL/Hr) IV Continuous <Continuous>  dextrose 50% Injectable 12.5 Gram(s) IV Push once  dextrose 50% Injectable 25 Gram(s) IV Push once  ferrous    sulfate 325 milliGRAM(s) Oral two times a day  glucagon  Injectable 1 milliGRAM(s) IntraMuscular once  heparin   Injectable 5000 Unit(s) SubCutaneous every 8 hours  insulin glargine Injectable (LANTUS) 20 Unit(s) SubCutaneous at bedtime  insulin lispro (ADMELOG) corrective regimen sliding scale   SubCutaneous at bedtime  insulin lispro (ADMELOG) corrective regimen sliding scale   SubCutaneous three times a day before meals  levothyroxine 75 MICROGram(s) Oral daily  pantoprazole    Tablet 40 milliGRAM(s) Oral before breakfast    MEDICATIONS  (PRN):  dextrose Oral Gel 15 Gram(s) Oral once PRN Blood Glucose LESS THAN 70 milliGRAM(s)/deciliter      CAPILLARY BLOOD GLUCOSE      POCT Blood Glucose.: 233 mg/dL (13 Apr 2024 21:23)  POCT Blood Glucose.: 190 mg/dL (13 Apr 2024 17:50)  POCT Blood Glucose.: 148 mg/dL (13 Apr 2024 12:36)  POCT Blood Glucose.: 129 mg/dL (13 Apr 2024 09:05)    I&O's Summary    13 Apr 2024 07:01  -  14 Apr 2024 07:00  --------------------------------------------------------  IN: 840 mL / OUT: 1400 mL / NET: -560 mL        VITALS:   T(C): 36.7 (04-14-24 @ 04:25), Max: 36.8 (04-13-24 @ 20:48)  HR: 39 (04-14-24 @ 06:08) (39 - 60)  BP: 101/64 (04-14-24 @ 06:08) (101/64 - 110/62)  RR: 17 (04-14-24 @ 06:08) (17 - 18)  SpO2: 99% (04-14-24 @ 06:08) (95% - 100%)    GENERAL: NAD, lying in bed comfortably  HEAD:  Atraumatic, normocephalic  EYES: EOMI, PERRLA, conjunctiva and sclera clear  ENT: Moist mucous membranes  NECK: Supple, no JVD  HEART: Regular rate and rhythm, no murmurs, rubs, or gallops  LUNGS: Unlabored respirations.  Clear to auscultation bilaterally, no crackles, wheezing, or rhonchi  ABDOMEN: Soft, nontender, nondistended, +BS  EXTREMITIES: 2+ peripheral pulses bilaterally. No clubbing, cyanosis, or edema  NERVOUS SYSTEM:  A&Ox3, no focal deficits   SKIN: No rashes or lesions    LABS:                        10.0   5.03  )-----------( 186      ( 14 Apr 2024 07:18 )             33.3     04-14    133<L>  |  93<L>  |  66<H>  ----------------------------<  154<H>  4.3   |  29  |  2.72<H>    Ca    8.3<L>      14 Apr 2024 07:17  Phos  4.3     04-14  Mg     2.5     04-14    TPro  6.9  /  Alb  3.2<L>  /  TBili  0.5  /  DBili  x   /  AST  13  /  ALT  7<L>  /  AlkPhos  64  04-14    PT/INR - ( 12 Apr 2024 14:23 )   PT: 11.8 sec;   INR: 1.13 ratio         PTT - ( 12 Apr 2024 14:23 )  PTT:28.3 sec      Urinalysis Basic - ( 14 Apr 2024 07:17 )    Color: x / Appearance: x / SG: x / pH: x  Gluc: 154 mg/dL / Ketone: x  / Bili: x / Urobili: x   Blood: x / Protein: x / Nitrite: x   Leuk Esterase: x / RBC: x / WBC x   Sq Epi: x / Non Sq Epi: x / Bacteria: x          RADIOLOGY & ADDITIONAL TESTS:  Results Reviewed:   Imaging Personally Reviewed:  Electrocardiogram Personally Reviewed:    COORDINATION OF CARE:  Care Discussed with Consultants/Other Providers [Y/N]:  Prior or Outpatient Records Reviewed [Y/N]:

## 2024-04-14 NOTE — DIETITIAN INITIAL EVALUATION ADULT - REASON INDICATOR FOR ASSESSMENT
RD assessment warranted for: Consult for Decompensated heart failure, salt and fluid restriction. Chart reviewed, events noted.  Source: medical record, patient.

## 2024-04-14 NOTE — PROGRESS NOTE ADULT - PROBLEM SELECTOR PLAN 1
-Suspect cardiogenic given recent diuresis, addition of GDMT, pre-existing AS  -With arm shaking   -Orthostatics wnl,     Plan:  - ctm on telemetry  - CT Head  - f/u carotid US  -Structural cardiology consulted

## 2024-04-14 NOTE — PROVIDER CONTACT NOTE (OTHER) - ASSESSMENT
Patient asleep , heart rate sustaining  in the 30's went down low as 36.Vitals BP-101/64, heart rate -39/mt,O 2sat-99% on 3L/NC. Denies chest pain,discomfort/dizziness and SOB,Patient wants to sleep.

## 2024-04-14 NOTE — PROGRESS NOTE ADULT - ASSESSMENT
73F w HFrEF (EF 30%), mod AS, known LBBB, HTN, IDDM1, CKD, hypothyroidism, chronic lymphedema, suspected OHS/LELIA on 3L NC home O2 p/w 1 episode of syncope. Recent admission at [facility] (3/29-4/5) for ADHF and DUNCAN s/p diuresis, discharged with new home O2 3L NC suspecting OHS/LELIA pending outpatient w/u. Since discharge a week ago, she has been staying at home with   Pt had sob walking to car. Once in car, she was still breathing heavily. Partner noticed pt was not responding to verbal stimuli for 10-15sec and witnessed R arm jerking. Pt gained consciousness quickly without postictal symptoms. Pt went to Cardiologist Dr. Nathan who recommended pt to come to ED. No fever, cp, abd pain, n/v. Leg swelling is improved from prior. Pt on torsemide 40mg which she has been taking. Pt was discharged on 3LNC which she is currently on. Patient reports she did not take Farxiga that she was discharged on as she was concerned about side effects.     In ED, patient was found afebrile, hemodynamically stable, breathing well on 3L NC. Initial labs notable for mild hyponatremia, DUNCAN on CKD, elevated proBNP and elevated pCO2 both improved from prior.  pt also noticed with abn creatinine      1- CKD IV   2- CHF   3- lymphedema   4- severe AS   5- syncope     she had syncope and has severe AS   her egfr i low as well  with cystatin C gfr is low as well   she will stand at risk for worsening renal function and dialysis as well in setting of contrast   This was d/w pt and accompanied partner in detail as well. given DM contrast nephropathy risk is upto 57% and dialysis risk is 12%.   24 hr urine for creatinine clearance  bumex drip 0.5 mg hr may need to raise dose further   to have renal sono   cont O2 and tele monitor   derm consult for rash LE

## 2024-04-14 NOTE — DIETITIAN INITIAL EVALUATION ADULT - ADD RECOMMEND
-Continue Consistent Carbohydrate DASH diet.  -RD to allow double protein.  -Monitor PO intake, diet, weight, labs, skin, GI symptoms, and BM regularity.  -RD remains available upon request and will follow up per protocol.   -Nutrition risk notification placed in chart for BMI>40.

## 2024-04-14 NOTE — PROGRESS NOTE ADULT - PROBLEM SELECTOR PLAN 9
+ u/a for bacteria and WBC, however asymptomatic   -HDS, afebrile, wihtout leukocytosis.    Plan:  CTM

## 2024-04-14 NOTE — DIETITIAN INITIAL EVALUATION ADULT - PERTINENT MEDS FT
MEDICATIONS  (STANDING):  aspirin  chewable 81 milliGRAM(s) Oral daily  atorvastatin 80 milliGRAM(s) Oral at bedtime  buMETAnide Infusion 0.5 mG/Hr (2.5 mL/Hr) IV Continuous <Continuous>  dextrose 10% Bolus 125 milliLiter(s) IV Bolus once  dextrose 5%. 1000 milliLiter(s) (100 mL/Hr) IV Continuous <Continuous>  dextrose 5%. 1000 milliLiter(s) (50 mL/Hr) IV Continuous <Continuous>  dextrose 50% Injectable 12.5 Gram(s) IV Push once  dextrose 50% Injectable 25 Gram(s) IV Push once  ferrous    sulfate 325 milliGRAM(s) Oral two times a day  glucagon  Injectable 1 milliGRAM(s) IntraMuscular once  heparin   Injectable 5000 Unit(s) SubCutaneous every 8 hours  insulin glargine Injectable (LANTUS) 25 Unit(s) SubCutaneous at bedtime  insulin lispro (ADMELOG) corrective regimen sliding scale   SubCutaneous at bedtime  insulin lispro (ADMELOG) corrective regimen sliding scale   SubCutaneous three times a day before meals  levothyroxine 75 MICROGram(s) Oral daily  pantoprazole    Tablet 40 milliGRAM(s) Oral before breakfast    MEDICATIONS  (PRN):  dextrose Oral Gel 15 Gram(s) Oral once PRN Blood Glucose LESS THAN 70 milliGRAM(s)/deciliter

## 2024-04-14 NOTE — DIETITIAN INITIAL EVALUATION ADULT - PROBLEM SELECTOR PLAN 2
TTE (4/2024) LVEF 30% w mod LVH, mildly reduced RV, AS (0.61 cm2), mod pHTN.   On home Torsemide 40 mg daily, Toprol  mg daily, Farxiga 5mg daily. Follows w Dr. Cherise rosas eventual plan to start further GDMT as renally tolerated  proBNP 51K from 80k.     - low threshold for resuming active diuresis  - strict I/Os, daily weights, fluid restrict 1.5L/day  - c/w home meds

## 2024-04-14 NOTE — DIETITIAN NUTRITION RISK NOTIFICATION - TREATMENT: THE FOLLOWING DIET HAS BEEN RECOMMENDED
Diet, Regular:   Consistent Carbohydrate {Evening Snack} (CSTCHOSN)  DASH/TLC {Sodium & Cholesterol Restricted} (DASH)  1500mL Fluid Restriction ([id number]) (04-12-24 @ 18:32) [Active]

## 2024-04-14 NOTE — DIETITIAN INITIAL EVALUATION ADULT - PROBLEM SELECTOR PLAN 9
Hospital Bundle    Fluids: FLUID RESTRICT 1.5L/day  Electrolytes: Replete K > 4, Mg > 2, Phos > 3  Nutrition: Diet DASH CC 1L FLUID RESTRICT  PPX  ---VTE: HSQ  ---GI: c/w PPI  ---Resp: c/w home O2  Access: PIV  Code Status: pending further discussions  Dispo: pending clinical improvement

## 2024-04-14 NOTE — DIETITIAN INITIAL EVALUATION ADULT - LITERATURE/VIDEOS GIVEN
Heart Failure Nutrition Therapy  Heart Healthy Label Reading Tips  Heart Healthy Shopping Tips  Sodium (salt) Content of Foods   Plate Method for Diabetes

## 2024-04-15 NOTE — PROGRESS NOTE ADULT - ASSESSMENT
73F w HFrEF (EF 30%), mod AS, known LBBB, HTN, IDDM1, CKD, hypothyroidism, chronic lymphedema, p/w 1 episode of syncope with R arm jerking.     #Syncope-Aortic Stenosis  - Episode after exertion  - Known Aortic Stenosis likely Severe in setting of decreased LVEF  - Structural Heart evaluation in progress    #Chronic Systolic HF (EF 38%), mod AS, HTN, LBBB, Lymphedema   - Has known systolic HF and mod AS.    - Repeat TTE showed EF 30%, mild-mod LVH, mildly reduced RVF, mod-severe AS (.61 cmsq), mod pHTN  - Had cath 2022-Non obstructive CAD  - On home Torsemide 20 mg daily, Eplerenone 25 mg daily, and Toprol  mg daily  - Compliant with all meds including Torsemide, though, dose was reduced to Furosemide  40mg once daily.   - proBNP 51K from 80k.   - Consideration for TAVR  - neg 1.4 L over last 24 hours on Bumex gtt, which should be continued    # Acute kidney injury superimposed on CKD.   - creatinine trend stable and favorable  - cont to monitor while on Bumex     #LBBB  Known LBBB  Noted intermittent Wenkebach on telemetry with bradycardia and 2:1 AV conduction  - I have called EP for evaluation  - BB on hold

## 2024-04-15 NOTE — PROGRESS NOTE ADULT - PROBLEM SELECTOR PLAN 1
-Suspect cardiogenic given recent diuresis, addition of GDMT, pre-existing AS  -With arm shaking   -Orthostatics wnl, - CT Head wnl, carotid US wnl     Plan:  - ctm on telemetry  -Structural cardiology consulted, considering for TAVR

## 2024-04-15 NOTE — PROGRESS NOTE ADULT - SUBJECTIVE AND OBJECTIVE BOX
[facility]   [phone]  RENAL FOLLOW UP NOTE  --------------------------------------------------------------------------------  Chief Complaint:    24 hour events/subjective:    patient seen and examined.   denies sob    PAST HISTORY  --------------------------------------------------------------------------------  No significant changes to PMH, PSH, FHx, SHx, unless otherwise noted    ALLERGIES & MEDICATIONS  --------------------------------------------------------------------------------  Allergies    Ativan (Rash; Urticaria)  penicillins (Hives)    Intolerances      Standing Inpatient Medications  aspirin  chewable 81 milliGRAM(s) Oral daily  atorvastatin 80 milliGRAM(s) Oral at bedtime  buMETAnide Infusion 0.5 mG/Hr IV Continuous <Continuous>  dextrose 10% Bolus 125 milliLiter(s) IV Bolus once  dextrose 5%. 1000 milliLiter(s) IV Continuous <Continuous>  dextrose 5%. 1000 milliLiter(s) IV Continuous <Continuous>  dextrose 50% Injectable 12.5 Gram(s) IV Push once  dextrose 50% Injectable 25 Gram(s) IV Push once  ferrous    sulfate 325 milliGRAM(s) Oral two times a day  glucagon  Injectable 1 milliGRAM(s) IntraMuscular once  heparin   Injectable 5000 Unit(s) SubCutaneous every 8 hours  insulin glargine Injectable (LANTUS) 25 Unit(s) SubCutaneous at bedtime  insulin lispro (ADMELOG) corrective regimen sliding scale   SubCutaneous at bedtime  insulin lispro (ADMELOG) corrective regimen sliding scale   SubCutaneous three times a day before meals  levothyroxine 75 MICROGram(s) Oral daily  pantoprazole    Tablet 40 milliGRAM(s) Oral before breakfast    PRN Inpatient Medications  dextrose Oral Gel 15 Gram(s) Oral once PRN      REVIEW OF SYSTEMS  --------------------------------------------------------------------------------    Gen: denies fevers/chills,  CVS: denies chest pain/palpitations  Resp: denies worsening SOB/Cough  GI: Denies N/V/Abd pain  : Denies dysuria    VITALS/PHYSICAL EXAM  --------------------------------------------------------------------------------  T(C): 36.9 (04-15-24 @ 04:09), Max: 36.9 (04-14-24 @ 23:49)  HR: 63 (04-15-24 @ 04:09) (57 - 63)  BP: 108/65 (04-15-24 @ 04:09) (108/64 - 114/70)  RR: 18 (04-15-24 @ 04:09) (17 - 18)  SpO2: 99% (04-15-24 @ 04:09) (99% - 99%)  Wt(kg): --        04-14-24 @ 07:01  -  04-15-24 @ 07:00  --------------------------------------------------------  IN: 960 mL / OUT: 2400 mL / NET: -1440 mL      Physical Exam:  	  	Gen: Non toxic comfortable appearing, on O2  	Pulm: decrease bs  no rales or ronchi or wheezing  	CV: No JVD. RRR, S1S2; no rub II/VI M   	Abd: +BS, soft, nontender/nondistended obese   	: No suprapubic tenderness  	UE: Warm, no cyanosis  no clubbing,  no edema  	LE: Warm, no cyanosis  no clubbing, 4+ edema, B/L LE raised red plaque   	Neuro: alert and oriented. speech coherent     LABS/STUDIES  --------------------------------------------------------------------------------              10.3   5.22  >-----------<  183      [04-15-24 @ 06:52]              34.6     137  |  96  |  66  ----------------------------<  179      [04-15-24 @ 06:52]  4.3   |  29  |  2.60        Ca     8.6     [04-15-24 @ 06:52]      Mg     2.4     [04-15-24 @ 06:52]      Phos  4.1     [04-15-24 @ 06:52]    TPro  6.9  /  Alb  3.3  /  TBili  0.6  /  DBili  x   /  AST  15  /  ALT  8   /  AlkPhos  63  [04-15-24 @ 06:52]          Creatinine Trend:  SCr 2.60 [04-15 @ 06:52]  SCr 2.69 [04-14 @ 19:53]  SCr 2.72 [04-14 @ 07:17]  SCr 2.66 [04-13 @ 07:05]  SCr 2.58 [04-12 @ 14:23]            Urine Creatinine 34      [04-12-24 @ 16:51]  Urine Protein 8      [04-12-24 @ 16:51]  Urine Sodium 27      [04-12-24 @ 16:51]  Urine Urea Nitrogen 243      [04-12-24 @ 18:07]  Urine Potassium 29      [04-12-24 @ 16:51]  Urine Osmolality 207      [04-12-24 @ 16:51]    TSH 5.06      [04-14-24 @ 07:18]  Lipid: chol 74, TG 70, HDL 33, LDL --      [04-13-24 @ 07:05]      < from: US Kidney and Bladder (04.13.24 @ 17:19) >  ACC: [id number] EXAM:  US KIDNEYS AND BLADDER   ORDERED BY:  ALFREDO MUHAMMAD     PROCEDURE DATE:  04/13/2024          INTERPRETATION:  CLINICAL INFORMATION: DUNCAN on CKD    COMPARISON: August 12, 2023.    TECHNIQUE: Sonography of the kidneys and bladder.    FINDINGS:  Limited examination due to body habitus.    Right kidney: 11.1 cm. No renal mass, hydronephrosis or calculi.    Left kidney: 8.7. No renal mass, hydronephrosis or calculi.    Urinary bladder: Within normal limits.    IMPRESSION:  No hydronephrosis or other acute finding.    --- End of Report ---    < end of copied text >

## 2024-04-15 NOTE — PROGRESS NOTE ADULT - NUTRITIONAL ASSESSMENT
This patient has been assessed with a concern for Malnutrition and has been determined to have a diagnosis/diagnoses of Morbid obesity (BMI > 40).    This patient is being managed with:   Diet Regular-  Consistent Carbohydrate {Evening Snack} (CSTCHOSN)  DASH/TLC {Sodium & Cholesterol Restricted} (DASH)  1500mL Fluid Restriction ([id number])  Entered: Apr 12 2024  6:32PM

## 2024-04-15 NOTE — PROGRESS NOTE ADULT - PROBLEM SELECTOR PLAN 6
Erythematous appearing but pt states improved from prior? Chronic, possible infection though no systemic signs.     - ctm  - elevate legs as tolerated, encourage ambulation  - compression stockings, ace wrapping Erythematous appearing but pt states improved from prior? Chronic, possible infection though no systemic signs.     - ctm  - elevate legs as tolerated, encourage ambulation  - compression stockings, ace wrapping  - Wound care consulted Erythematous appearing but pt states improved from prior? Chronic, possible infection though no systemic signs.     - ctm  - elevate legs as tolerated, encourage ambulation  - compression stockings, ace wrapping  - Wound care following  - Dermatology consulted for lymphedema wounds as well as psoriatic lesions

## 2024-04-15 NOTE — PROGRESS NOTE ADULT - SUBJECTIVE AND OBJECTIVE BOX
*****Structural Heart Team*****    Subjective:    Ms. Chowdhury is resting in bed, offering no complaints at this time.    PAST MEDICAL & SURGICAL HISTORY:  Hypertension    Diabetes    Lymphedema    H/O left bundle branch block    History of left bundle branch block (LBBB)    Systolic heart failure, chronic    Type 1 diabetes    H/O cataract  2020    History of surgical removal of meniscus of knee  left in 1971    Frozen shoulder  2000    H/O Achilles tendon repair  lengthened bilaterally, 2000    Fractured skull  1968          T(C): 37 (04-15-24 @ 11:50), Max: 37 (04-15-24 @ 11:50)  HR: 62 (04-15-24 @ 11:50) (62 - 63)  BP: 112/60 (04-15-24 @ 11:50) (108/65 - 114/70)  RR: 18 (04-15-24 @ 11:50) (17 - 18)  SpO2: 99% (04-15-24 @ 11:50) (99% - 99%)  Wt(kg): --  04-14 @ 07:01  -  04-15 @ 07:00  --------------------------------------------------------  IN: 960 mL / OUT: 2400 mL / NET: -1440 mL      MEDICATIONS  (STANDING):  aspirin  chewable 81 milliGRAM(s) Oral daily  atorvastatin 80 milliGRAM(s) Oral at bedtime  buMETAnide Infusion 0.5 mG/Hr (2.5 mL/Hr) IV Continuous <Continuous>  ferrous    sulfate 325 milliGRAM(s) Oral two times a day  glucagon  Injectable 1 milliGRAM(s) IntraMuscular once  heparin   Injectable 5000 Unit(s) SubCutaneous every 8 hours  insulin glargine Injectable (LANTUS) 25 Unit(s) SubCutaneous at bedtime  insulin lispro (ADMELOG) corrective regimen sliding scale   SubCutaneous at bedtime  insulin lispro (ADMELOG) corrective regimen sliding scale   SubCutaneous three times a day before meals  levothyroxine 75 MICROGram(s) Oral daily  pantoprazole    Tablet 40 milliGRAM(s) Oral before breakfast    MEDICATIONS  (PRN):  dextrose Oral Gel 15 Gram(s) Oral once PRN Blood Glucose LESS THAN 70 milliGRAM(s)/deciliter      Review of Symptoms:  Constitutional: Awake, Alert, Follows commands  Respiratory: + SOB  Cardiac: Denies CP, Denies Palpitations  Gastrointestinal: Denies Pain, Denies N/V, tolerating po intake  Vascular: Negative  Extremities: + Edema, No joint pain or swelling  Neurological: Syncope  Endocrine: No heat or cold intolerance, No excessive thirst  Heme/Onc: Negative    Exam:  General: A/Ox3, DOMINIC, NAD  HEENT: Supple, No JVD, Trachea midline, no masses  Pulmonary: CTAB, = Chest Excursion, no accessory muscle use  Cor: S1S21, RRR, III/VI STEVE  ECG: SR, possible episode of 2nd Degree AVB  Gastrointestinal: Soft, NT/ND, + Bowel Sounds  Neuro: = motor and sensory B/L, No focal deficits  Vascular: 1+ Pulses B/L, 2+ Edema  Extremities: No Joint pain or swelling  Skin: + Psoriatic Plaques                          10.3   5.22  )-----------( 183      ( 15 Apr 2024 06:52 )             34.6   04-15    137  |  96  |  66<H>  ----------------------------<  179<H>  4.3   |  29  |  2.60<H>    Ca    8.6      15 Apr 2024 06:52  Phos  4.1     04-15  Mg     2.4     04-15    TPro  6.9  /  Alb  3.3  /  TBili  0.6  /  DBili  x   /  AST  15  /  ALT  8<L>  /  AlkPhos  63  04-15      Imaging Reviewed:    Echocardiogram:      < from: TTE W or WO Ultrasound Enhancing Agent (04.01.24 @ 10:59) >  CONCLUSIONS:      1. Technically difficult image quality.   2. Left ventricular systolic function is moderately to severely decreased with an ejection fraction of 30 % by Nunes's method of disks.   3. Mildto moderate left ventricular hypertrophy.   4. The right ventricle is not well visualized. Based on visual assessment, the right ventricle appears mildly enlarged. mildly reduced systolic function.   5. The left atrium is mildly dilated.   6. Trace aortic regurgitation.   7. Mild mitral regurgitation.   8. Moderate to severe aortic stenosis. There is a Vmax of 3.66m/s, Mean gradient of 26mmHg and a DI of 0.19. The JUAN DANIEL is calculated as 0.61cm2 by continuity. This likely represents low flow low gradient AS.   9. Estimated pulmonary artery systolic pressure is 59 mmHg, consistent with moderate pulmonary hypertension.  10. The inferior vena cava is dilated measuring 2.60 cm in diameter, (dilated >2.1cm) with normal inspiratory collapse (normal >50%) consistent with mildly elevated right atrial pressure (~8, range 5-10mmHg).    ________________________________________________________________________________________  FINDINGS:     Left Ventricle:  Left ventricular systolic function is moderately to severely decreased with a calculated ejection fraction of 30 % by the Nunes's biplane method of disks. Mild to moderate left ventricular hypertrophy.     Right Ventricle:  The right ventricle is not well visualized. Based on visual assessment, the right ventricle appears mildly enlarged. The right ventricular cavity is mildly enlarged in size and mildly reduced systolic function. Tricuspid annular plane systolic excursion (TAPSE) is 2.4 cm (normal >=1.7 cm).     Left Atrium:  The left atrium is mildly dilated with an indexed volume of 34.20 ml/m².     Right Atrium:  The right atrium is normal in size.     Aortic Valve:  There is calcification of the aortic valve leaflets. There is moderate to severe aortic stenosis. The peak transaorticvelocity is 3.66 m/s, peak transaortic gradient is 53.6 mmHg and mean transaortic gradient is 26.0 mmHg with an LVOT/aortic valve VTI ratio of 0.19. The aortic valve area is estimated at 0.61 cm² by the continuity equation and 1.16 cm² by 2D planimetry. There is trace aortic regurgitation.     Mitral Valve:  There is mild calcification of the mitral valve annulus. Mitral valve leaflets are diffusely calcified. There is mild mitral regurgitation.     Tricuspid Valve:  There is mild tricuspid regurgitation. Estimated pulmonary artery systolic pressure is 59 mmHg, consistent with moderate pulmonary hypertension.     Pulmonic Valve:  The pulmonic valve was not well visualized. There is trace pulmonic regurgitation.     Aorta:  The aortic root at the sinuses of Valsalva is normal in size, measuring 2.90 cm (indexed 1.11 cm/m²).     Systemic Veins:  The inferior vena cava is dilated measuring 2.60 cm in diameter, (dilated >2.1cm) with normal inspiratory collapse (normal >50%) consistent with mildly elevated right atrial pressure (~8, range 5-10mmHg).  ____________________________________________________________________  QUANTITATIVE DATA:  Left Ventricle Measurements: (Indexed to BSA)     IVSd (2D):   1.3 cm  LVPWd (2D):  1.2 cm  LVIDd (2D):  5.4 cm  LVIDs (2D):  4.7 cm  LV Mass:     284 g  108.7 g/m²  LV Vol d, MOD A2C: 138.0 ml 52.85 ml/m²  LV Vol d, MOD A4C: 114.0 ml 43.65 ml/m²  LV Vol d, MOD BP:  128.3 ml 49.14 ml/m²  LV Vol s, MOD A2C: 86.9 ml  33.28 ml/m²  LV Vol s, MOD A4C: 88.5ml  33.89 ml/m²  LV Vol s, MOD BP:  89.5 ml  34.28 ml/m²  LVOT SV MOD BP:    38.8 ml  LV EF% MOD BP:     30 %     MV E Vmax:    0.75 m/s  MV A Vmax:    0.97 m/s  MV E/A:       0.77  e' lateral:   8.16 cm/s  e' medial:    3.59 cm/s  E/e' lateral: 9.18  E/e' medial:  20.86  E/e' Average: 12.75  MV DT:        268 msec    Aorta Measurements: (Normal range) (Indexed to BSA)     Sinuses of Valsalva: 2.90 cm (2.7 - 3.3 cm)       Left Atrium Measurements: (Indexed to BSA)  LA Diam 2D: 4.90 cm    Right Ventricle Measurements:     TAPSE:            2.4 cm  RV Base (RVID1):  4.9 cm  RV Mid (RVID2):   4.1 cm  RV Major (RVID3): 8.3 cm       LVOT / RVOT/ Qp/Qs Data: (Indexed to BSA)  LVOT Diameter: 2.00 cm  LVOT Vmax:     0.67 m/s  LVOT VTI:      19.00 cm  LVOT SV:       59.7 ml  22.86 ml/m²    Aortic Valve Measurements:  AV Vmax:                3.7 m/s  AV Peak Gradient:       53.6 mmHg  AV Mean Gradient:       26.0 mmHg  AV VTI:                 97.8 cm  AV VTI Ratio:           0.19  AoV EOA, Contin:      0.61 cm²  AoV EOA, Contin i:      0.23 cm²/m²  AoV area, 2D planim:    1.16 cm²  AoV Dimensionless Index 0.19    Mitral Valve Measurements:     MV E Vmax: 0.7 m/s  MV A Vmax: 1.0 m/s  MV E/A:    0.8       Tricuspid Valve Measurements:     TR Vmax:          3.6 m/s  TR Peak Gradient: 51.3 mmHg  RA Pressure:      8 mmHg  PASP:             59 mmHg    < end of copied text >        Assesment/Plan:  73 F with Severe Aortic Stenosis, Syncope, Psoriasis, Acute on Chronic Systolic Heart Failure, Acute on Chronic Kidney Injury    1.) Aortic Stenosis: Patient found to have Severe Aortic Stenosis on TTE, and we were asked to evaluate for possible TAVR. She will get Cardiac CT today. Per Dr. Martell she will likely not need a Cardiac Catheterization as her last was in 2022.  2.) Acute on Chronic Kidney injury: Patient being followed by Dr. Merino. Discussion had with patient regarding the possible need for HD. She has had HD in the past, and is willing to undergo HD again if needed. She understands that she may need it permanently, but is willing to get CT. Discussed with Dr. Merino who agrees. We will watch Creatinine after CT.  3.) Acute on Chronic Systolic HF: Monitor Daily weight, Monitor I and O's. Continue with bumetanide.  4.) Psoriasis: Request that dermatology consult called. We are concerned for infectious risk due to psoriatic lesions, which could potentially infect prosthetic valve.  5.) Syncope: Monitor patient, carotid dopplers      TABATHA Horne  [id number]

## 2024-04-15 NOTE — PROGRESS NOTE ADULT - ASSESSMENT
73F w HFrEF (EF 30%), mod AS, known LBBB, HTN, IDDM1, CKD, hypothyroidism, chronic lymphedema, p/w 1 episode of syncope with R arm jerking, likely 2/2 severe symptomatic AS. Pending CTA imagings for TAVR eval, c/b DUNCAN on CKD, on bumex gtt.       73F w HFrEF (EF 30%), mod AS, known LBBB, HTN, IDDM1, CKD, hypothyroidism, chronic lymphedema, p/w 1 episode of syncope with R arm jerking, likely 2/2 severe symptomatic AS. CTA imagings performed for TAVR eval, c/b DUNCAN on CKD, on bumex gtt.

## 2024-04-15 NOTE — PROGRESS NOTE ADULT - SUBJECTIVE AND OBJECTIVE BOX
PROGRESS NOTE:   Authored by Dr. Mireya Hayden MD (PGY-1). Pager [facility] [phone]/ [facility]     Patient is a 73y old  Female who presents with a chief complaint of Syncope (14 Apr 2024 15:16)      SUBJECTIVE / OVERNIGHT EVENTS:  No acute events overnight.     All other ROS neg except as above in HPI    MEDICATIONS  (STANDING):  aspirin  chewable 81 milliGRAM(s) Oral daily  atorvastatin 80 milliGRAM(s) Oral at bedtime  buMETAnide Infusion 0.5 mG/Hr (2.5 mL/Hr) IV Continuous <Continuous>  dextrose 10% Bolus 125 milliLiter(s) IV Bolus once  dextrose 5%. 1000 milliLiter(s) (100 mL/Hr) IV Continuous <Continuous>  dextrose 5%. 1000 milliLiter(s) (50 mL/Hr) IV Continuous <Continuous>  dextrose 50% Injectable 12.5 Gram(s) IV Push once  dextrose 50% Injectable 25 Gram(s) IV Push once  ferrous    sulfate 325 milliGRAM(s) Oral two times a day  glucagon  Injectable 1 milliGRAM(s) IntraMuscular once  heparin   Injectable 5000 Unit(s) SubCutaneous every 8 hours  insulin glargine Injectable (LANTUS) 25 Unit(s) SubCutaneous at bedtime  insulin lispro (ADMELOG) corrective regimen sliding scale   SubCutaneous at bedtime  insulin lispro (ADMELOG) corrective regimen sliding scale   SubCutaneous three times a day before meals  levothyroxine 75 MICROGram(s) Oral daily  pantoprazole    Tablet 40 milliGRAM(s) Oral before breakfast    MEDICATIONS  (PRN):  dextrose Oral Gel 15 Gram(s) Oral once PRN Blood Glucose LESS THAN 70 milliGRAM(s)/deciliter      CAPILLARY BLOOD GLUCOSE      POCT Blood Glucose.: 247 mg/dL (14 Apr 2024 21:47)  POCT Blood Glucose.: 251 mg/dL (14 Apr 2024 17:54)  POCT Blood Glucose.: 200 mg/dL (14 Apr 2024 13:06)  POCT Blood Glucose.: 142 mg/dL (14 Apr 2024 08:51)    I&O's Summary    14 Apr 2024 07:01  -  15 Apr 2024 07:00  --------------------------------------------------------  IN: 960 mL / OUT: 2400 mL / NET: -1440 mL        PHYSICAL EXAM:  Vital Signs Last 24 Hrs  T(C): 36.9 (15 Apr 2024 04:09), Max: 36.9 (14 Apr 2024 23:49)  T(F): 98.4 (15 Apr 2024 04:09), Max: 98.5 (14 Apr 2024 23:49)  HR: 63 (15 Apr 2024 04:09) (57 - 63)  BP: 108/65 (15 Apr 2024 04:09) (108/64 - 114/70)  BP(mean): --  RR: 18 (15 Apr 2024 04:09) (17 - 18)  SpO2: 99% (15 Apr 2024 04:09) (99% - 99%)    Parameters below as of 15 Apr 2024 04:09  Patient On (Oxygen Delivery Method): nasal cannula  O2 Flow (L/min): 3      CONSTITUTIONAL: NAD, well-developed  HEET: MMM, EOMI, PERRLA  NECK: supple  RESPIRATORY: Normal respiratory effort; lungs are clear to auscultation bilaterally  CARDIOVASCULAR: Regular rate and rhythm, normal S1 and S2, no murmur/rub/gallop; No lower extremity edema; Peripheral pulses are 2+ bilaterally  ABDOMEN: Nontender to palpation, normoactive bowel sounds, no rebound/guarding; No hepatosplenomegaly  MUSCULOSKELETAL: no clubbing or cyanosis of digits; no joint swelling or tenderness to palpation  NEURO: Moving all four extremities, sensation grossly intact  PSYCH: A+O to person, place, and time; affect appropriate  SKIN: No rash    LABS:                        10.3   5.22  )-----------( 183      ( 15 Apr 2024 06:52 )             34.6     04-14    133<L>  |  94<L>  |  66<H>  ----------------------------<  242<H>  4.9   |  28  |  2.69<H>    Ca    9.2      14 Apr 2024 19:53  Phos  4.3     04-14  Mg     2.5     04-14    TPro  6.9  /  Alb  3.2<L>  /  TBili  0.5  /  DBili  x   /  AST  13  /  ALT  7<L>  /  AlkPhos  64  04-14          Urinalysis Basic - ( 14 Apr 2024 19:53 )    Color: x / Appearance: x / SG: x / pH: x  Gluc: 242 mg/dL / Ketone: x  / Bili: x / Urobili: x   Blood: x / Protein: x / Nitrite: x   Leuk Esterase: x / RBC: x / WBC x   Sq Epi: x / Non Sq Epi: x / Bacteria: x          Tele Reviewed:    RADIOLOGY & ADDITIONAL TESTS:  Results Reviewed:   Imaging Personally Reviewed:  Electrocardiogram Personally Reviewed:     PROGRESS NOTE:   Authored by Dr. Mireya Hayden MD (PGY-1). Pager [facility] [phone]/ [facility]     Patient is a 73y old  Female who presents with a chief complaint of Syncope (14 Apr 2024 15:16)      SUBJECTIVE / OVERNIGHT EVENTS:  No acute events overnight. Feels well    All other ROS neg except as above in HPI    MEDICATIONS  (STANDING):  aspirin  chewable 81 milliGRAM(s) Oral daily  atorvastatin 80 milliGRAM(s) Oral at bedtime  buMETAnide Infusion 0.5 mG/Hr (2.5 mL/Hr) IV Continuous <Continuous>  dextrose 10% Bolus 125 milliLiter(s) IV Bolus once  dextrose 5%. 1000 milliLiter(s) (100 mL/Hr) IV Continuous <Continuous>  dextrose 5%. 1000 milliLiter(s) (50 mL/Hr) IV Continuous <Continuous>  dextrose 50% Injectable 12.5 Gram(s) IV Push once  dextrose 50% Injectable 25 Gram(s) IV Push once  ferrous    sulfate 325 milliGRAM(s) Oral two times a day  glucagon  Injectable 1 milliGRAM(s) IntraMuscular once  heparin   Injectable 5000 Unit(s) SubCutaneous every 8 hours  insulin glargine Injectable (LANTUS) 25 Unit(s) SubCutaneous at bedtime  insulin lispro (ADMELOG) corrective regimen sliding scale   SubCutaneous at bedtime  insulin lispro (ADMELOG) corrective regimen sliding scale   SubCutaneous three times a day before meals  levothyroxine 75 MICROGram(s) Oral daily  pantoprazole    Tablet 40 milliGRAM(s) Oral before breakfast    MEDICATIONS  (PRN):  dextrose Oral Gel 15 Gram(s) Oral once PRN Blood Glucose LESS THAN 70 milliGRAM(s)/deciliter      CAPILLARY BLOOD GLUCOSE      POCT Blood Glucose.: 247 mg/dL (14 Apr 2024 21:47)  POCT Blood Glucose.: 251 mg/dL (14 Apr 2024 17:54)  POCT Blood Glucose.: 200 mg/dL (14 Apr 2024 13:06)  POCT Blood Glucose.: 142 mg/dL (14 Apr 2024 08:51)    I&O's Summary    14 Apr 2024 07:01  -  15 Apr 2024 07:00  --------------------------------------------------------  IN: 960 mL / OUT: 2400 mL / NET: -1440 mL        PHYSICAL EXAM:  Vital Signs Last 24 Hrs  T(C): 36.9 (15 Apr 2024 04:09), Max: 36.9 (14 Apr 2024 23:49)  T(F): 98.4 (15 Apr 2024 04:09), Max: 98.5 (14 Apr 2024 23:49)  HR: 63 (15 Apr 2024 04:09) (57 - 63)  BP: 108/65 (15 Apr 2024 04:09) (108/64 - 114/70)  BP(mean): --  RR: 18 (15 Apr 2024 04:09) (17 - 18)  SpO2: 99% (15 Apr 2024 04:09) (99% - 99%)    Parameters below as of 15 Apr 2024 04:09  Patient On (Oxygen Delivery Method): nasal cannula  O2 Flow (L/min): 3      CONSTITUTIONAL: NAD, well-developed,   HEET: MMM, EOMI, PERRLA  NECK: supple  RESPIRATORY: Normal respiratory effort; bibasilar crackles, on 3L NC  CARDIOVASCULAR: Regular rate and rhythm, normal S1 and S2, no murmur/rub/gallop; chronic lymphedema;   ABDOMEN: Nontender to palpation, normoactive bowel sounds, no rebound/guarding;   MUSCULOSKELETAL: full ROM  NEURO: no focal neuro deficits, AO3  SKIN: psoriatic lesions b/l arms, wounds on LE b/l    LABS:                        10.3   5.22  )-----------( 183      ( 15 Apr 2024 06:52 )             34.6     04-14    133<L>  |  94<L>  |  66<H>  ----------------------------<  242<H>  4.9   |  28  |  2.69<H>    Ca    9.2      14 Apr 2024 19:53  Phos  4.3     04-14  Mg     2.5     04-14    TPro  6.9  /  Alb  3.2<L>  /  TBili  0.5  /  DBili  x   /  AST  13  /  ALT  7<L>  /  AlkPhos  64  04-14          Urinalysis Basic - ( 14 Apr 2024 19:53 )    Color: x / Appearance: x / SG: x / pH: x  Gluc: 242 mg/dL / Ketone: x  / Bili: x / Urobili: x   Blood: x / Protein: x / Nitrite: x   Leuk Esterase: x / RBC: x / WBC x   Sq Epi: x / Non Sq Epi: x / Bacteria: x

## 2024-04-15 NOTE — CONSULT NOTE ADULT - NS ATTEND AMEND GEN_ALL_CORE FT
This is a difficult situation. I reviewed echo which appears to show a low flow low gradient AS. Last year Vmax when output was better showed    a value of 350 indicating at least severe AS. She will almost certainly develop diaylsis dependent ESRD with any contrast workup for TAVR. Prior to TAVR she will require a pacemaker. Between LE swelling/cellulitis risks in the setting of likely ESRD, the only safe device would be an AV Micra which will not help worsening of LV function due to LBBB. She is still too ill to even undergo this since she cannot lie flat. The only option may be to place an AV Micra, proceed with TAVR and then reassess later for the need of transvenous device with conduction sytem or CRT pacing if needed. She should probably have a repeat of her echo study done here. Will further review testing and discuss with Dr. Martell.

## 2024-04-15 NOTE — PROGRESS NOTE ADULT - ASSESSMENT
73F w HFrEF (EF 30%), mod AS, known LBBB, HTN, IDDM1, CKD, hypothyroidism, chronic lymphedema, suspected OHS/LELIA on 3L NC home O2 p/w 1 episode of syncope. Recent admission [facility] (3/29-4/5) for ADHF and DUNCAN s/p diuresis, discharged with new home O2 3L NC suspecting OHS/LELIA pending outpatient w/u. Since discharge a week ago, she has been staying at home with   Pt had sob walking to car. Once in car, she was still breathing heavily. Partner noticed pt was not responding to verbal stimuli for 10-15sec and witnessed R arm jerking. Pt gained consciousness quickly without postictal symptoms. Pt went to Cardiologist Dr. Nathan who recommended pt to come to ED. No fever, cp, abd pain, n/v. Leg swelling is improved from prior. Pt on torsemide 40mg which she has been taking. Pt was discharged on 3LNC which she is currently on. Patient reports she did not take Farxiga that she was discharged on as she was concerned about side effects.     In ED, patient was found afebrile, hemodynamically stable, breathing well on 3L NC. Initial labs notable for mild hyponatremia, DUNCAN on CKD, elevated proBNP and elevated pCO2 both improved from prior.  pt also noticed with abn creatinine      1- CKD IV   2- CHF   3- lymphedema   4- severe AS   5- syncope       creatinine is steady at this level  TAVR work up in progress  EP consulted for syncope  she will stand at risk for worsening renal function and dialysis as well in setting of contrast   she verbalized understanding.   Dr. Merino d/w structural heart team, and patient may proceed with CTA chest/abdomen/coronaries today   continue bumex drip 0.5 mg/hr  cont O2 and tele monitor   derm consult for rash LE

## 2024-04-15 NOTE — PROGRESS NOTE ADULT - CONVERSATION DETAILS
Discussed advance directives with patient- who states her partner Catherine is her HCP, and that given ongoing plans to proceed with possible TAVR, she would be FULL CODE at this time.

## 2024-04-15 NOTE — CONSULT NOTE ADULT - SUBJECTIVE AND OBJECTIVE BOX
CHIEF COMPLAINT:    HISTORY OF PRESENT ILLNESS:      Allergies    Ativan (Rash; Urticaria)  penicillins (Hives)    Intolerances    	    MEDICATIONS:  aspirin  chewable 81 milliGRAM(s) Oral daily  buMETAnide Infusion 0.5 mG/Hr IV Continuous <Continuous>  heparin   Injectable 5000 Unit(s) SubCutaneous every 8 hours          pantoprazole    Tablet 40 milliGRAM(s) Oral before breakfast    atorvastatin 80 milliGRAM(s) Oral at bedtime  dextrose 50% Injectable 12.5 Gram(s) IV Push once  dextrose 50% Injectable 25 Gram(s) IV Push once  dextrose Oral Gel 15 Gram(s) Oral once PRN  glucagon  Injectable 1 milliGRAM(s) IntraMuscular once  insulin glargine Injectable (LANTUS) 25 Unit(s) SubCutaneous at bedtime  insulin lispro (ADMELOG) corrective regimen sliding scale   SubCutaneous at bedtime  insulin lispro (ADMELOG) corrective regimen sliding scale   SubCutaneous three times a day before meals  levothyroxine 75 MICROGram(s) Oral daily    dextrose 10% Bolus 125 milliLiter(s) IV Bolus once  dextrose 5%. 1000 milliLiter(s) IV Continuous <Continuous>  dextrose 5%. 1000 milliLiter(s) IV Continuous <Continuous>  ferrous    sulfate 325 milliGRAM(s) Oral two times a day      PAST MEDICAL & SURGICAL HISTORY:  Hypertension      Diabetes      Lymphedema      H/O left bundle branch block      History of left bundle branch block (LBBB)      Systolic heart failure, chronic      Type 1 diabetes      H/O cataract  2020      History of surgical removal of meniscus of knee  left in 1971      Frozen shoulder  2000      H/O Achilles tendon repair  lengthened bilaterally, 2000      Fractured skull  1968          FAMILY HISTORY:  Family history of CVA  mom, age 57        SOCIAL HISTORY:    [ ]Non-smoker  [ ] Smoker  [ ] Alcohol      REVIEW OF SYSTEMS:  See HPI. All ROS negative unless otherwise noted    PHYSICAL EXAM:  T(C): 36.9 (04-15-24 @ 04:09), Max: 36.9 (04-14-24 @ 23:49)  HR: 63 (04-15-24 @ 04:09) (57 - 63)  BP: 108/65 (04-15-24 @ 04:09) (108/64 - 114/70)  RR: 18 (04-15-24 @ 04:09) (17 - 18)  SpO2: 99% (04-15-24 @ 04:09) (99% - 99%)  Wt(kg): --  I&O's Summary    14 Apr 2024 07:01  -  15 Apr 2024 07:00  --------------------------------------------------------  IN: 960 mL / OUT: 2400 mL / NET: -1440 mL        Appearance: Alert. NAD	  HEENT:   NC/AT	  Cardiovascular: +S1S2 RRR no m/g/r  Respiratory: CTA B/L	  Psychiatry: A & O x 3, Mood & affect appropriate  Gastrointestinal:  Soft, NT.ND., + BS	  Skin: No rashes	  Neurologic: Non-focal  Extremities: No edema BLE  Vascular: Peripheral pulses palpable 2+ bilaterally      LABS:	 	    CBC Full  -  ( 15 Apr 2024 06:52 )  WBC Count : 5.22 K/uL  Hemoglobin : 10.3 g/dL  Hematocrit : 34.6 %  Platelet Count - Automated : 183 K/uL  Mean Cell Volume : 93.3 fl  Mean Cell Hemoglobin : 27.8 pg  Mean Cell Hemoglobin Concentration : 29.8 gm/dL  Auto Neutrophil # : 3.35 K/uL  Auto Lymphocyte # : 0.63 K/uL  Auto Monocyte # : 0.71 K/uL  Auto Eosinophil # : 0.40 K/uL  Auto Basophil # : 0.08 K/uL  Auto Neutrophil % : 64.1 %  Auto Lymphocyte % : 12.1 %  Auto Monocyte % : 13.6 %  Auto Eosinophil % : 7.7 %  Auto Basophil % : 1.5 %    04-15    137  |  96  |  66<H>  ----------------------------<  179<H>  4.3   |  29  |  2.60<H>  04-14    133<L>  |  94<L>  |  66<H>  ----------------------------<  242<H>  4.9   |  28  |  2.69<H>    Ca    8.6      15 Apr 2024 06:52  Ca    9.2      14 Apr 2024 19:53  Phos  4.1     04-15  Phos  4.3     04-14  Mg     2.4     04-15  Mg     2.5     04-14    TPro  6.9  /  Alb  3.3  /  TBili  0.6  /  DBili  x   /  AST  15  /  ALT  8<L>  /  AlkPhos  63  04-15  TPro  6.9  /  Alb  3.2<L>  /  TBili  0.5  /  DBili  x   /  AST  13  /  ALT  7<L>  /  AlkPhos  64  04-14      proBNP:   Lipid Profile:   HgA1c:   TSH:       CARDIAC MARKERS:                TELEMETRY: 	    ECG:  	  RADIOLOGY:  OTHER: 	    PREVIOUS DIAGNOSTIC TESTING:    Echocardiogram:    < from: TTE W or WO Ultrasound Enhancing Agent (04.01.24 @ 10:59) >  CONCLUSIONS:      1. Technically difficult image quality.   2. Left ventricular systolic function is moderately to severely decreased with an ejection fraction of 30 % by Nunes's method of disks.   3. Mildto moderate left ventricular hypertrophy.   4. The right ventricle is not well visualized. Based on visual assessment, the right ventricle appears mildly enlarged. mildly reduced systolic function.   5. The left atrium is mildly dilated.   6. Trace aortic regurgitation.   7. Mild mitral regurgitation.   8. Moderate to severe aortic stenosis. There is a Vmax of 3.66m/s, Mean gradient of 26mmHg and a DI of 0.19. The JUAN DANIEL is calculated as 0.61cm2 by continuity. This likely represents low flow low gradient AS.   9. Estimated pulmonary artery systolic pressure is 59 mmHg, consistent with moderate pulmonary hypertension.  10. The inferior vena cava is dilated measuring 2.60 cm in diameter, (dilated >2.1cm) with normal inspiratory collapse (normal >50%) consistent with mildly elevated right atrial pressure (~8, range 5-10mmHg).    ________________________________________________________________________________________  FINDINGS:     Left Ventricle:  Left ventricular systolic function is moderately to severely decreased with a calculated ejection fraction of 30 % by the Nunes's biplane method of disks. Mild to moderate left ventricular hypertrophy.     Right Ventricle:  The right ventricle is not well visualized. Based on visual assessment, the right ventricle appears mildly enlarged. The right ventricular cavity is mildly enlarged in size and mildly reduced systolic function. Tricuspid annular plane systolic excursion (TAPSE) is 2.4 cm (normal >=1.7 cm).     Left Atrium:  The left atrium is mildly dilated with an indexed volume of 34.20 ml/m².     Right Atrium:  The right atrium is normal in size.     Aortic Valve:  There is calcification of the aortic valve leaflets. There is moderate to severe aortic stenosis. The peak transaorticvelocity is 3.66 m/s, peak transaortic gradient is 53.6 mmHg and mean transaortic gradient is 26.0 mmHg with an LVOT/aortic valve VTI ratio of 0.19. The aortic valve area is estimated at 0.61 cm² by the continuity equation and 1.16 cm² by 2D planimetry. There is trace aortic regurgitation.     Mitral Valve:  There is mild calcification of the mitral valve annulus. Mitral valve leaflets are diffusely calcified. There is mild mitral regurgitation.     Tricuspid Valve:  There is mild tricuspid regurgitation. Estimated pulmonary artery systolic pressure is 59 mmHg, consistent with moderate pulmonary hypertension.     Pulmonic Valve:  The pulmonic valve was not well visualized. There is trace pulmonic regurgitation.     Aorta:  The aortic root at the sinuses of Valsalva is normal in size, measuring 2.90 cm (indexed 1.11 cm/m²).     Systemic Veins:  The inferior vena cava is dilated measuring 2.60 cm in diameter, (dilated >2.1cm) with normal inspiratory collapse (normal >50%) consistent with mildly elevated right atrial pressure (~8, range 5-10mmHg).  ____________________________________________________________________  QUANTITATIVE DATA:  Left Ventricle Measurements: (Indexed to BSA)     IVSd (2D):   1.3 cm  LVPWd (2D):  1.2 cm  LVIDd (2D):  5.4 cm  LVIDs (2D):  4.7 cm  LV Mass:     284 g  108.7 g/m²  LV Vol d, MOD A2C: 138.0 ml 52.85 ml/m²  LV Vol d, MOD A4C: 114.0 ml 43.65 ml/m²  LV Vol d, MOD BP:  128.3 ml 49.14 ml/m²  LV Vol s, MOD A2C: 86.9 ml  33.28 ml/m²  LV Vol s, MOD A4C: 88.5ml  33.89 ml/m²  LV Vol s, MOD BP:  89.5 ml  34.28 ml/m²  LVOT SV MOD BP:    38.8 ml  LV EF% MOD BP:     30 %     MV E Vmax:    0.75 m/s  MV A Vmax:    0.97 m/s  MV E/A:       0.77  e' lateral:   8.16 cm/s  e' medial:    3.59 cm/s  E/e' lateral: 9.18  E/e' medial:  20.86  E/e' Average: 12.75  MV DT:        268 msec    Aorta Measurements: (Normal range) (Indexed to BSA)     Sinuses of Valsalva: 2.90 cm (2.7 - 3.3 cm)       Left Atrium Measurements: (Indexed to BSA)  LA Diam 2D: 4.90 cm    Right Ventricle Measurements:     TAPSE:            2.4 cm  RV Base (RVID1):  4.9 cm  RV Mid (RVID2):   4.1 cm  RV Major (RVID3): 8.3 cm       LVOT / RVOT/ Qp/Qs Data: (Indexed to BSA)  LVOT Diameter: 2.00 cm  LVOT Vmax:     0.67 m/s  LVOT VTI:      19.00 cm  LVOT SV:       59.7 ml  22.86 ml/m²    Aortic Valve Measurements:  AV Vmax:                3.7 m/s  AV Peak Gradient:       53.6 mmHg  AV Mean Gradient:       26.0 mmHg  AV VTI:                 97.8 cm  AV VTI Ratio:           0.19  AoV EOA, Contin:      0.61 cm²  AoV EOA, Contin i:      0.23 cm²/m²  AoV area, 2D planim:    1.16 cm²  AoV Dimensionless Index 0.19    Mitral Valve Measurements:     MV E Vmax: 0.7 m/s  MV A Vmax: 1.0 m/s  MV E/A:    0.8       Tricuspid Valve Measurements:     TR Vmax:          3.6 m/s  TR Peak Gradient: 51.3 mmHg  RA Pressure:      8 mmHg  PASP:             59 mmHg    < end of copied text >    Catheterization:    Stress Test:  	  	  ASSESSMENT/PLAN: 	     CHIEF COMPLAINT:    HISTORY OF PRESENT ILLNESS: 74yo woman with PMHx of HFrEF (EF 30%), mod AS, known 1st degree AVB and LBBB, HTN, IDDM1, CKD, hypothyroidism, chronic lymphedema, suspected OHS/LELIA on 3L NC home O2 p/w 1 episode of syncope. Recent admission at [facility] (3/29-4/5) for ADHF and DUNCAN s/p diuresis, discharged with new home O2 3L NC suspecting OHS/LELIA pending outpatient w/u.  Pt had sob walking to car. Once in car, she was still breathing heavily. Partner noticed pt was not responding to verbal stimuli for 10-15sec and witnessed R arm jerking. Pt gained consciousness quickly without postictal symptoms. Pt went to Cardiologist Dr. Nathan who recommended pt to come to ED. No fever, cp, abd pain, n/v. Leg swelling is improved from prior. Pt on torsemide 40mg which she has been taking. Pt was discharged on 3LNC which she is currently on. Patient reports she did not take Farxiga that she was discharged on as she was concerned about side effects.     In ED, patient was found afebrile, hemodynamically stable, breathing well on 3L NC. Initial labs notable for mild hyponatremia, DUNCAN on CKD, elevated proBNP and elevated pCO2 both improved from prior.        Allergies    Ativan (Rash; Urticaria)  penicillins (Hives)    Intolerances    	    MEDICATIONS:  aspirin  chewable 81 milliGRAM(s) Oral daily  buMETAnide Infusion 0.5 mG/Hr IV Continuous <Continuous>  heparin   Injectable 5000 Unit(s) SubCutaneous every 8 hours          pantoprazole    Tablet 40 milliGRAM(s) Oral before breakfast    atorvastatin 80 milliGRAM(s) Oral at bedtime  dextrose 50% Injectable 12.5 Gram(s) IV Push once  dextrose 50% Injectable 25 Gram(s) IV Push once  dextrose Oral Gel 15 Gram(s) Oral once PRN  glucagon  Injectable 1 milliGRAM(s) IntraMuscular once  insulin glargine Injectable (LANTUS) 25 Unit(s) SubCutaneous at bedtime  insulin lispro (ADMELOG) corrective regimen sliding scale   SubCutaneous at bedtime  insulin lispro (ADMELOG) corrective regimen sliding scale   SubCutaneous three times a day before meals  levothyroxine 75 MICROGram(s) Oral daily    dextrose 10% Bolus 125 milliLiter(s) IV Bolus once  dextrose 5%. 1000 milliLiter(s) IV Continuous <Continuous>  dextrose 5%. 1000 milliLiter(s) IV Continuous <Continuous>  ferrous    sulfate 325 milliGRAM(s) Oral two times a day      PAST MEDICAL & SURGICAL HISTORY:  Hypertension      Diabetes      Lymphedema      H/O left bundle branch block      History of left bundle branch block (LBBB)      Systolic heart failure, chronic      Type 1 diabetes      H/O cataract  2020      History of surgical removal of meniscus of knee  left in 1971      Frozen shoulder  2000      H/O Achilles tendon repair  lengthened bilaterally, 2000      Fractured skull  1968          FAMILY HISTORY:  Family history of CVA  mom, age 57        SOCIAL HISTORY:    [ ]Non-smoker  [ ] Smoker  [ ] Alcohol      REVIEW OF SYSTEMS:  See HPI. All ROS negative unless otherwise noted    PHYSICAL EXAM:  T(C): 36.9 (04-15-24 @ 04:09), Max: 36.9 (04-14-24 @ 23:49)  HR: 63 (04-15-24 @ 04:09) (57 - 63)  BP: 108/65 (04-15-24 @ 04:09) (108/64 - 114/70)  RR: 18 (04-15-24 @ 04:09) (17 - 18)  SpO2: 99% (04-15-24 @ 04:09) (99% - 99%)  Wt(kg): --  I&O's Summary    14 Apr 2024 07:01  -  15 Apr 2024 07:00  --------------------------------------------------------  IN: 960 mL / OUT: 2400 mL / NET: -1440 mL        Appearance: Alert. NAD	  HEENT:   NC/AT	  Cardiovascular: +S1S2 RRR no m/g/r  Respiratory: CTA B/L	  Psychiatry: A & O x 3, Mood & affect appropriate  Gastrointestinal:  Soft, NT.ND., + BS	  Skin: No rashes	  Neurologic: Non-focal  Extremities: No edema BLE  Vascular: Peripheral pulses palpable 2+ bilaterally      LABS:	 	    CBC Full  -  ( 15 Apr 2024 06:52 )  WBC Count : 5.22 K/uL  Hemoglobin : 10.3 g/dL  Hematocrit : 34.6 %  Platelet Count - Automated : 183 K/uL  Mean Cell Volume : 93.3 fl  Mean Cell Hemoglobin : 27.8 pg  Mean Cell Hemoglobin Concentration : 29.8 gm/dL  Auto Neutrophil # : 3.35 K/uL  Auto Lymphocyte # : 0.63 K/uL  Auto Monocyte # : 0.71 K/uL  Auto Eosinophil # : 0.40 K/uL  Auto Basophil # : 0.08 K/uL  Auto Neutrophil % : 64.1 %  Auto Lymphocyte % : 12.1 %  Auto Monocyte % : 13.6 %  Auto Eosinophil % : 7.7 %  Auto Basophil % : 1.5 %    04-15    137  |  96  |  66<H>  ----------------------------<  179<H>  4.3   |  29  |  2.60<H>  04-14    133<L>  |  94<L>  |  66<H>  ----------------------------<  242<H>  4.9   |  28  |  2.69<H>    Ca    8.6      15 Apr 2024 06:52  Ca    9.2      14 Apr 2024 19:53  Phos  4.1     04-15  Phos  4.3     04-14  Mg     2.4     04-15  Mg     2.5     04-14    TPro  6.9  /  Alb  3.3  /  TBili  0.6  /  DBili  x   /  AST  15  /  ALT  8<L>  /  AlkPhos  63  04-15  TPro  6.9  /  Alb  3.2<L>  /  TBili  0.5  /  DBili  x   /  AST  13  /  ALT  7<L>  /  AlkPhos  64  04-14      proBNP:   Lipid Profile:   HgA1c:   TSH:       CARDIAC MARKERS:                TELEMETRY: 	    ECG:  	  RADIOLOGY:  OTHER: 	    PREVIOUS DIAGNOSTIC TESTING:    Echocardiogram:    < from: TTE W or WO Ultrasound Enhancing Agent (04.01.24 @ 10:59) >  CONCLUSIONS:      1. Technically difficult image quality.   2. Left ventricular systolic function is moderately to severely decreased with an ejection fraction of 30 % by Nunes's method of disks.   3. Mildto moderate left ventricular hypertrophy.   4. The right ventricle is not well visualized. Based on visual assessment, the right ventricle appears mildly enlarged. mildly reduced systolic function.   5. The left atrium is mildly dilated.   6. Trace aortic regurgitation.   7. Mild mitral regurgitation.   8. Moderate to severe aortic stenosis. There is a Vmax of 3.66m/s, Mean gradient of 26mmHg and a DI of 0.19. The JUAN DANIEL is calculated as 0.61cm2 by continuity. This likely represents low flow low gradient AS.   9. Estimated pulmonary artery systolic pressure is 59 mmHg, consistent with moderate pulmonary hypertension.  10. The inferior vena cava is dilated measuring 2.60 cm in diameter, (dilated >2.1cm) with normal inspiratory collapse (normal >50%) consistent with mildly elevated right atrial pressure (~8, range 5-10mmHg).    ________________________________________________________________________________________  FINDINGS:     Left Ventricle:  Left ventricular systolic function is moderately to severely decreased with a calculated ejection fraction of 30 % by the Nunes's biplane method of disks. Mild to moderate left ventricular hypertrophy.     Right Ventricle:  The right ventricle is not well visualized. Based on visual assessment, the right ventricle appears mildly enlarged. The right ventricular cavity is mildly enlarged in size and mildly reduced systolic function. Tricuspid annular plane systolic excursion (TAPSE) is 2.4 cm (normal >=1.7 cm).     Left Atrium:  The left atrium is mildly dilated with an indexed volume of 34.20 ml/m².     Right Atrium:  The right atrium is normal in size.     Aortic Valve:  There is calcification of the aortic valve leaflets. There is moderate to severe aortic stenosis. The peak transaorticvelocity is 3.66 m/s, peak transaortic gradient is 53.6 mmHg and mean transaortic gradient is 26.0 mmHg with an LVOT/aortic valve VTI ratio of 0.19. The aortic valve area is estimated at 0.61 cm² by the continuity equation and 1.16 cm² by 2D planimetry. There is trace aortic regurgitation.     Mitral Valve:  There is mild calcification of the mitral valve annulus. Mitral valve leaflets are diffusely calcified. There is mild mitral regurgitation.     Tricuspid Valve:  There is mild tricuspid regurgitation. Estimated pulmonary artery systolic pressure is 59 mmHg, consistent with moderate pulmonary hypertension.     Pulmonic Valve:  The pulmonic valve was not well visualized. There is trace pulmonic regurgitation.     Aorta:  The aortic root at the sinuses of Valsalva is normal in size, measuring 2.90 cm (indexed 1.11 cm/m²).     Systemic Veins:  The inferior vena cava is dilated measuring 2.60 cm in diameter, (dilated >2.1cm) with normal inspiratory collapse (normal >50%) consistent with mildly elevated right atrial pressure (~8, range 5-10mmHg).  ____________________________________________________________________  QUANTITATIVE DATA:  Left Ventricle Measurements: (Indexed to BSA)     IVSd (2D):   1.3 cm  LVPWd (2D):  1.2 cm  LVIDd (2D):  5.4 cm  LVIDs (2D):  4.7 cm  LV Mass:     284 g  108.7 g/m²  LV Vol d, MOD A2C: 138.0 ml 52.85 ml/m²  LV Vol d, MOD A4C: 114.0 ml 43.65 ml/m²  LV Vol d, MOD BP:  128.3 ml 49.14 ml/m²  LV Vol s, MOD A2C: 86.9 ml  33.28 ml/m²  LV Vol s, MOD A4C: 88.5ml  33.89 ml/m²  LV Vol s, MOD BP:  89.5 ml  34.28 ml/m²  LVOT SV MOD BP:    38.8 ml  LV EF% MOD BP:     30 %     MV E Vmax:    0.75 m/s  MV A Vmax:    0.97 m/s  MV E/A:       0.77  e' lateral:   8.16 cm/s  e' medial:    3.59 cm/s  E/e' lateral: 9.18  E/e' medial:  20.86  E/e' Average: 12.75  MV DT:        268 msec    Aorta Measurements: (Normal range) (Indexed to BSA)     Sinuses of Valsalva: 2.90 cm (2.7 - 3.3 cm)       Left Atrium Measurements: (Indexed to BSA)  LA Diam 2D: 4.90 cm    Right Ventricle Measurements:     TAPSE:            2.4 cm  RV Base (RVID1):  4.9 cm  RV Mid (RVID2):   4.1 cm  RV Major (RVID3): 8.3 cm       LVOT / RVOT/ Qp/Qs Data: (Indexed to BSA)  LVOT Diameter: 2.00 cm  LVOT Vmax:     0.67 m/s  LVOT VTI:      19.00 cm  LVOT SV:       59.7 ml  22.86 ml/m²    Aortic Valve Measurements:  AV Vmax:                3.7 m/s  AV Peak Gradient:       53.6 mmHg  AV Mean Gradient:       26.0 mmHg  AV VTI:                 97.8 cm  AV VTI Ratio:           0.19  AoV EOA, Contin:      0.61 cm²  AoV EOA, Contin i:      0.23 cm²/m²  AoV area, 2D planim:    1.16 cm²  AoV Dimensionless Index 0.19    Mitral Valve Measurements:     MV E Vmax: 0.7 m/s  MV A Vmax: 1.0 m/s  MV E/A:    0.8       Tricuspid Valve Measurements:     TR Vmax:          3.6 m/s  TR Peak Gradient: 51.3 mmHg  RA Pressure:      8 mmHg  PASP:             59 mmHg    < end of copied text >    Catheterization:    Stress Test:  	  	  ASSESSMENT/PLAN: 	     CHIEF COMPLAINT:    HISTORY OF PRESENT ILLNESS: 74yo woman with PMHx of HFrEF (EF 30%), mod AS, known 1st degree AVB and LBBB, HTN, IDDM1, CKD, hypothyroidism, chronic lymphedema, suspected OHS/LELIA on 3L NC home O2 p/w 1 episode of syncope. Recent admission at [facility] (3/29-4/5) for ADHF and DUNCAN s/p diuresis, discharged with new home O2 3L NC suspecting OHS/LELIA pending outpatient w/u.   Pt states she does not remember her syncopal episode. She remembers walking to the car and getting into it. She denies any preceding symptoms. She was told that she "blacked out" and was not responding for 10-15 seconds with arm shaking. There was documentation previously that pt was dyspneic upon getting into car after walking but pt did not recall that to me today.  Pt went to Cardiologist Dr. Nathan who recommended pt to come to ED.        Allergies    Ativan (Rash; Urticaria)  penicillins (Hives)    Intolerances    	    MEDICATIONS:  aspirin  chewable 81 milliGRAM(s) Oral daily  buMETAnide Infusion 0.5 mG/Hr IV Continuous <Continuous>  heparin   Injectable 5000 Unit(s) SubCutaneous every 8 hours          pantoprazole    Tablet 40 milliGRAM(s) Oral before breakfast    atorvastatin 80 milliGRAM(s) Oral at bedtime  dextrose 50% Injectable 12.5 Gram(s) IV Push once  dextrose 50% Injectable 25 Gram(s) IV Push once  dextrose Oral Gel 15 Gram(s) Oral once PRN  glucagon  Injectable 1 milliGRAM(s) IntraMuscular once  insulin glargine Injectable (LANTUS) 25 Unit(s) SubCutaneous at bedtime  insulin lispro (ADMELOG) corrective regimen sliding scale   SubCutaneous at bedtime  insulin lispro (ADMELOG) corrective regimen sliding scale   SubCutaneous three times a day before meals  levothyroxine 75 MICROGram(s) Oral daily    dextrose 10% Bolus 125 milliLiter(s) IV Bolus once  dextrose 5%. 1000 milliLiter(s) IV Continuous <Continuous>  dextrose 5%. 1000 milliLiter(s) IV Continuous <Continuous>  ferrous    sulfate 325 milliGRAM(s) Oral two times a day      PAST MEDICAL & SURGICAL HISTORY:  Hypertension      Diabetes      Lymphedema      H/O left bundle branch block      History of left bundle branch block (LBBB)      Systolic heart failure, chronic      Type 1 diabetes      H/O cataract  2020      History of surgical removal of meniscus of knee  left in 1971      Frozen shoulder  2000      H/O Achilles tendon repair  lengthened bilaterally, 2000      Fractured skull  1968          FAMILY HISTORY:  Family history of CVA  mom, age 57        SOCIAL HISTORY:    [ ]Non-smoker  [ ] Smoker  [ ] Alcohol      REVIEW OF SYSTEMS:  See HPI. All ROS negative unless otherwise noted    PHYSICAL EXAM:  T(C): 36.9 (04-15-24 @ 04:09), Max: 36.9 (04-14-24 @ 23:49)  HR: 63 (04-15-24 @ 04:09) (57 - 63)  BP: 108/65 (04-15-24 @ 04:09) (108/64 - 114/70)  RR: 18 (04-15-24 @ 04:09) (17 - 18)  SpO2: 99% (04-15-24 @ 04:09) (99% - 99%)  Wt(kg): --  I&O's Summary    14 Apr 2024 07:01  -  15 Apr 2024 07:00  --------------------------------------------------------  IN: 960 mL / OUT: 2400 mL / NET: -1440 mL        Appearance: Alert. NAD	  HEENT:   NC/AT	  Cardiovascular: +S1S2 RRR no m/g/r  Respiratory: CTA B/L	  Psychiatry: A & O x 3, Mood & affect appropriate  Gastrointestinal:  Soft, NT.ND., + BS	  Skin: No rashes	  Neurologic: Non-focal  Extremities: No edema BLE  Vascular: Peripheral pulses palpable 2+ bilaterally      LABS:	 	    CBC Full  -  ( 15 Apr 2024 06:52 )  WBC Count : 5.22 K/uL  Hemoglobin : 10.3 g/dL  Hematocrit : 34.6 %  Platelet Count - Automated : 183 K/uL  Mean Cell Volume : 93.3 fl  Mean Cell Hemoglobin : 27.8 pg  Mean Cell Hemoglobin Concentration : 29.8 gm/dL  Auto Neutrophil # : 3.35 K/uL  Auto Lymphocyte # : 0.63 K/uL  Auto Monocyte # : 0.71 K/uL  Auto Eosinophil # : 0.40 K/uL  Auto Basophil # : 0.08 K/uL  Auto Neutrophil % : 64.1 %  Auto Lymphocyte % : 12.1 %  Auto Monocyte % : 13.6 %  Auto Eosinophil % : 7.7 %  Auto Basophil % : 1.5 %    04-15    137  |  96  |  66<H>  ----------------------------<  179<H>  4.3   |  29  |  2.60<H>  04-14    133<L>  |  94<L>  |  66<H>  ----------------------------<  242<H>  4.9   |  28  |  2.69<H>    Ca    8.6      15 Apr 2024 06:52  Ca    9.2      14 Apr 2024 19:53  Phos  4.1     04-15  Phos  4.3     04-14  Mg     2.4     04-15  Mg     2.5     04-14    TPro  6.9  /  Alb  3.3  /  TBili  0.6  /  DBili  x   /  AST  15  /  ALT  8<L>  /  AlkPhos  63  04-15  TPro  6.9  /  Alb  3.2<L>  /  TBili  0.5  /  DBili  x   /  AST  13  /  ALT  7<L>  /  AlkPhos  64  04-14      proBNP:   Lipid Profile:   HgA1c:   TSH:       CARDIAC MARKERS:                TELEMETRY: 	    ECG:  	  RADIOLOGY:  OTHER: 	    PREVIOUS DIAGNOSTIC TESTING:    Echocardiogram:    < from: TTE W or WO Ultrasound Enhancing Agent (04.01.24 @ 10:59) >  CONCLUSIONS:      1. Technically difficult image quality.   2. Left ventricular systolic function is moderately to severely decreased with an ejection fraction of 30 % by Nunes's method of disks.   3. Mildto moderate left ventricular hypertrophy.   4. The right ventricle is not well visualized. Based on visual assessment, the right ventricle appears mildly enlarged. mildly reduced systolic function.   5. The left atrium is mildly dilated.   6. Trace aortic regurgitation.   7. Mild mitral regurgitation.   8. Moderate to severe aortic stenosis. There is a Vmax of 3.66m/s, Mean gradient of 26mmHg and a DI of 0.19. The JUAN DANIEL is calculated as 0.61cm2 by continuity. This likely represents low flow low gradient AS.   9. Estimated pulmonary artery systolic pressure is 59 mmHg, consistent with moderate pulmonary hypertension.  10. The inferior vena cava is dilated measuring 2.60 cm in diameter, (dilated >2.1cm) with normal inspiratory collapse (normal >50%) consistent with mildly elevated right atrial pressure (~8, range 5-10mmHg).    ________________________________________________________________________________________  FINDINGS:     Left Ventricle:  Left ventricular systolic function is moderately to severely decreased with a calculated ejection fraction of 30 % by the Nunes's biplane method of disks. Mild to moderate left ventricular hypertrophy.     Right Ventricle:  The right ventricle is not well visualized. Based on visual assessment, the right ventricle appears mildly enlarged. The right ventricular cavity is mildly enlarged in size and mildly reduced systolic function. Tricuspid annular plane systolic excursion (TAPSE) is 2.4 cm (normal >=1.7 cm).     Left Atrium:  The left atrium is mildly dilated with an indexed volume of 34.20 ml/m².     Right Atrium:  The right atrium is normal in size.     Aortic Valve:  There is calcification of the aortic valve leaflets. There is moderate to severe aortic stenosis. The peak transaorticvelocity is 3.66 m/s, peak transaortic gradient is 53.6 mmHg and mean transaortic gradient is 26.0 mmHg with an LVOT/aortic valve VTI ratio of 0.19. The aortic valve area is estimated at 0.61 cm² by the continuity equation and 1.16 cm² by 2D planimetry. There is trace aortic regurgitation.     Mitral Valve:  There is mild calcification of the mitral valve annulus. Mitral valve leaflets are diffusely calcified. There is mild mitral regurgitation.     Tricuspid Valve:  There is mild tricuspid regurgitation. Estimated pulmonary artery systolic pressure is 59 mmHg, consistent with moderate pulmonary hypertension.     Pulmonic Valve:  The pulmonic valve was not well visualized. There is trace pulmonic regurgitation.     Aorta:  The aortic root at the sinuses of Valsalva is normal in size, measuring 2.90 cm (indexed 1.11 cm/m²).     Systemic Veins:  The inferior vena cava is dilated measuring 2.60 cm in diameter, (dilated >2.1cm) with normal inspiratory collapse (normal >50%) consistent with mildly elevated right atrial pressure (~8, range 5-10mmHg).  ____________________________________________________________________  QUANTITATIVE DATA:  Left Ventricle Measurements: (Indexed to BSA)     IVSd (2D):   1.3 cm  LVPWd (2D):  1.2 cm  LVIDd (2D):  5.4 cm  LVIDs (2D):  4.7 cm  LV Mass:     284 g  108.7 g/m²  LV Vol d, MOD A2C: 138.0 ml 52.85 ml/m²  LV Vol d, MOD A4C: 114.0 ml 43.65 ml/m²  LV Vol d, MOD BP:  128.3 ml 49.14 ml/m²  LV Vol s, MOD A2C: 86.9 ml  33.28 ml/m²  LV Vol s, MOD A4C: 88.5ml  33.89 ml/m²  LV Vol s, MOD BP:  89.5 ml  34.28 ml/m²  LVOT SV MOD BP:    38.8 ml  LV EF% MOD BP:     30 %     MV E Vmax:    0.75 m/s  MV A Vmax:    0.97 m/s  MV E/A:       0.77  e' lateral:   8.16 cm/s  e' medial:    3.59 cm/s  E/e' lateral: 9.18  E/e' medial:  20.86  E/e' Average: 12.75  MV DT:        268 msec    Aorta Measurements: (Normal range) (Indexed to BSA)     Sinuses of Valsalva: 2.90 cm (2.7 - 3.3 cm)       Left Atrium Measurements: (Indexed to BSA)  LA Diam 2D: 4.90 cm    Right Ventricle Measurements:     TAPSE:            2.4 cm  RV Base (RVID1):  4.9 cm  RV Mid (RVID2):   4.1 cm  RV Major (RVID3): 8.3 cm       LVOT / RVOT/ Qp/Qs Data: (Indexed to BSA)  LVOT Diameter: 2.00 cm  LVOT Vmax:     0.67 m/s  LVOT VTI:      19.00 cm  LVOT SV:       59.7 ml  22.86 ml/m²    Aortic Valve Measurements:  AV Vmax:                3.7 m/s  AV Peak Gradient:       53.6 mmHg  AV Mean Gradient:       26.0 mmHg  AV VTI:                 97.8 cm  AV VTI Ratio:           0.19  AoV EOA, Contin:      0.61 cm²  AoV EOA, Contin i:      0.23 cm²/m²  AoV area, 2D planim:    1.16 cm²  AoV Dimensionless Index 0.19    Mitral Valve Measurements:     MV E Vmax: 0.7 m/s  MV A Vmax: 1.0 m/s  MV E/A:    0.8       Tricuspid Valve Measurements:     TR Vmax:          3.6 m/s  TR Peak Gradient: 51.3 mmHg  RA Pressure:      8 mmHg  PASP:             59 mmHg    < end of copied text >    Catheterization:    Stress Test:  	  	  ASSESSMENT/PLAN: 	     CHIEF COMPLAINT:    HISTORY OF PRESENT ILLNESS: 74yo woman with PMHx of HFrEF (EF 30%), mod AS, known 1st degree AVB and LBBB, HTN, IDDM1, CKD, hypothyroidism, chronic lymphedema, suspected OHS/LELIA on 3L NC home O2 p/w 1 episode of syncope. Recent admission at [facility] (3/29-4/5) for ADHF and DUNCAN s/p diuresis, discharged with new home O2 3L NC suspecting OHS/LELIA pending outpatient w/u.   Pt states she does not remember her syncopal episode. She remembers walking to the car and getting into it. She denies any preceding symptoms. She was told that she "blacked out" and was not responding for 10-15 seconds with arm shaking. There was documentation previously that pt was dyspneic upon getting into car after walking but pt did not recall that to me today.  Pt went to Cardiologist Dr. Nathan who recommended pt to come to ED.    EKG consistent with 1st degree AVB (Sharath 318ms) with LBBB (QRSd 176). Early AM 4/14, she was noted to have 2:1 AV block. She is being worked up for         Allergies    Ativan (Rash; Urticaria)  penicillins (Hives)    Intolerances    	    MEDICATIONS:  aspirin  chewable 81 milliGRAM(s) Oral daily  buMETAnide Infusion 0.5 mG/Hr IV Continuous <Continuous>  heparin   Injectable 5000 Unit(s) SubCutaneous every 8 hours          pantoprazole    Tablet 40 milliGRAM(s) Oral before breakfast    atorvastatin 80 milliGRAM(s) Oral at bedtime  dextrose 50% Injectable 12.5 Gram(s) IV Push once  dextrose 50% Injectable 25 Gram(s) IV Push once  dextrose Oral Gel 15 Gram(s) Oral once PRN  glucagon  Injectable 1 milliGRAM(s) IntraMuscular once  insulin glargine Injectable (LANTUS) 25 Unit(s) SubCutaneous at bedtime  insulin lispro (ADMELOG) corrective regimen sliding scale   SubCutaneous at bedtime  insulin lispro (ADMELOG) corrective regimen sliding scale   SubCutaneous three times a day before meals  levothyroxine 75 MICROGram(s) Oral daily    dextrose 10% Bolus 125 milliLiter(s) IV Bolus once  dextrose 5%. 1000 milliLiter(s) IV Continuous <Continuous>  dextrose 5%. 1000 milliLiter(s) IV Continuous <Continuous>  ferrous    sulfate 325 milliGRAM(s) Oral two times a day      PAST MEDICAL & SURGICAL HISTORY:  Hypertension      Diabetes      Lymphedema      H/O left bundle branch block      History of left bundle branch block (LBBB)      Systolic heart failure, chronic      Type 1 diabetes      H/O cataract  2020      History of surgical removal of meniscus of knee  left in 1971      Frozen shoulder  2000      H/O Achilles tendon repair  lengthened bilaterally, 2000      Fractured skull  1968          FAMILY HISTORY:  Family history of CVA  mom, age 57        SOCIAL HISTORY:    [ ]Non-smoker  [ ] Smoker  [ ] Alcohol      REVIEW OF SYSTEMS:  See HPI. All ROS negative unless otherwise noted    PHYSICAL EXAM:  T(C): 36.9 (04-15-24 @ 04:09), Max: 36.9 (04-14-24 @ 23:49)  HR: 63 (04-15-24 @ 04:09) (57 - 63)  BP: 108/65 (04-15-24 @ 04:09) (108/64 - 114/70)  RR: 18 (04-15-24 @ 04:09) (17 - 18)  SpO2: 99% (04-15-24 @ 04:09) (99% - 99%)  Wt(kg): --  I&O's Summary    14 Apr 2024 07:01  -  15 Apr 2024 07:00  --------------------------------------------------------  IN: 960 mL / OUT: 2400 mL / NET: -1440 mL        Appearance: Alert. NAD	  HEENT:   NC/AT	  Cardiovascular: +S1S2 RRR no m/g/r  Respiratory: CTA B/L	  Psychiatry: A & O x 3, Mood & affect appropriate  Gastrointestinal:  Soft, NT.ND., + BS	  Skin: No rashes	  Neurologic: Non-focal  Extremities: No edema BLE  Vascular: Peripheral pulses palpable 2+ bilaterally      LABS:	 	    CBC Full  -  ( 15 Apr 2024 06:52 )  WBC Count : 5.22 K/uL  Hemoglobin : 10.3 g/dL  Hematocrit : 34.6 %  Platelet Count - Automated : 183 K/uL  Mean Cell Volume : 93.3 fl  Mean Cell Hemoglobin : 27.8 pg  Mean Cell Hemoglobin Concentration : 29.8 gm/dL  Auto Neutrophil # : 3.35 K/uL  Auto Lymphocyte # : 0.63 K/uL  Auto Monocyte # : 0.71 K/uL  Auto Eosinophil # : 0.40 K/uL  Auto Basophil # : 0.08 K/uL  Auto Neutrophil % : 64.1 %  Auto Lymphocyte % : 12.1 %  Auto Monocyte % : 13.6 %  Auto Eosinophil % : 7.7 %  Auto Basophil % : 1.5 %    04-15    137  |  96  |  66<H>  ----------------------------<  179<H>  4.3   |  29  |  2.60<H>  04-14    133<L>  |  94<L>  |  66<H>  ----------------------------<  242<H>  4.9   |  28  |  2.69<H>    Ca    8.6      15 Apr 2024 06:52  Ca    9.2      14 Apr 2024 19:53  Phos  4.1     04-15  Phos  4.3     04-14  Mg     2.4     04-15  Mg     2.5     04-14    TPro  6.9  /  Alb  3.3  /  TBili  0.6  /  DBili  x   /  AST  15  /  ALT  8<L>  /  AlkPhos  63  04-15  TPro  6.9  /  Alb  3.2<L>  /  TBili  0.5  /  DBili  x   /  AST  13  /  ALT  7<L>  /  AlkPhos  64  04-14      proBNP:   Lipid Profile:   HgA1c:   TSH:       CARDIAC MARKERS:                TELEMETRY: 	    ECG:  	  RADIOLOGY:  OTHER: 	    PREVIOUS DIAGNOSTIC TESTING:    Echocardiogram:    < from: TTE W or WO Ultrasound Enhancing Agent (04.01.24 @ 10:59) >  CONCLUSIONS:      1. Technically difficult image quality.   2. Left ventricular systolic function is moderately to severely decreased with an ejection fraction of 30 % by Nunes's method of disks.   3. Mildto moderate left ventricular hypertrophy.   4. The right ventricle is not well visualized. Based on visual assessment, the right ventricle appears mildly enlarged. mildly reduced systolic function.   5. The left atrium is mildly dilated.   6. Trace aortic regurgitation.   7. Mild mitral regurgitation.   8. Moderate to severe aortic stenosis. There is a Vmax of 3.66m/s, Mean gradient of 26mmHg and a DI of 0.19. The JUAN DANIEL is calculated as 0.61cm2 by continuity. This likely represents low flow low gradient AS.   9. Estimated pulmonary artery systolic pressure is 59 mmHg, consistent with moderate pulmonary hypertension.  10. The inferior vena cava is dilated measuring 2.60 cm in diameter, (dilated >2.1cm) with normal inspiratory collapse (normal >50%) consistent with mildly elevated right atrial pressure (~8, range 5-10mmHg).    ________________________________________________________________________________________  FINDINGS:     Left Ventricle:  Left ventricular systolic function is moderately to severely decreased with a calculated ejection fraction of 30 % by the Nunes's biplane method of disks. Mild to moderate left ventricular hypertrophy.     Right Ventricle:  The right ventricle is not well visualized. Based on visual assessment, the right ventricle appears mildly enlarged. The right ventricular cavity is mildly enlarged in size and mildly reduced systolic function. Tricuspid annular plane systolic excursion (TAPSE) is 2.4 cm (normal >=1.7 cm).     Left Atrium:  The left atrium is mildly dilated with an indexed volume of 34.20 ml/m².     Right Atrium:  The right atrium is normal in size.     Aortic Valve:  There is calcification of the aortic valve leaflets. There is moderate to severe aortic stenosis. The peak transaorticvelocity is 3.66 m/s, peak transaortic gradient is 53.6 mmHg and mean transaortic gradient is 26.0 mmHg with an LVOT/aortic valve VTI ratio of 0.19. The aortic valve area is estimated at 0.61 cm² by the continuity equation and 1.16 cm² by 2D planimetry. There is trace aortic regurgitation.     Mitral Valve:  There is mild calcification of the mitral valve annulus. Mitral valve leaflets are diffusely calcified. There is mild mitral regurgitation.     Tricuspid Valve:  There is mild tricuspid regurgitation. Estimated pulmonary artery systolic pressure is 59 mmHg, consistent with moderate pulmonary hypertension.     Pulmonic Valve:  The pulmonic valve was not well visualized. There is trace pulmonic regurgitation.     Aorta:  The aortic root at the sinuses of Valsalva is normal in size, measuring 2.90 cm (indexed 1.11 cm/m²).     Systemic Veins:  The inferior vena cava is dilated measuring 2.60 cm in diameter, (dilated >2.1cm) with normal inspiratory collapse (normal >50%) consistent with mildly elevated right atrial pressure (~8, range 5-10mmHg).  ____________________________________________________________________  QUANTITATIVE DATA:  Left Ventricle Measurements: (Indexed to BSA)     IVSd (2D):   1.3 cm  LVPWd (2D):  1.2 cm  LVIDd (2D):  5.4 cm  LVIDs (2D):  4.7 cm  LV Mass:     284 g  108.7 g/m²  LV Vol d, MOD A2C: 138.0 ml 52.85 ml/m²  LV Vol d, MOD A4C: 114.0 ml 43.65 ml/m²  LV Vol d, MOD BP:  128.3 ml 49.14 ml/m²  LV Vol s, MOD A2C: 86.9 ml  33.28 ml/m²  LV Vol s, MOD A4C: 88.5ml  33.89 ml/m²  LV Vol s, MOD BP:  89.5 ml  34.28 ml/m²  LVOT SV MOD BP:    38.8 ml  LV EF% MOD BP:     30 %     MV E Vmax:    0.75 m/s  MV A Vmax:    0.97 m/s  MV E/A:       0.77  e' lateral:   8.16 cm/s  e' medial:    3.59 cm/s  E/e' lateral: 9.18  E/e' medial:  20.86  E/e' Average: 12.75  MV DT:        268 msec    Aorta Measurements: (Normal range) (Indexed to BSA)     Sinuses of Valsalva: 2.90 cm (2.7 - 3.3 cm)       Left Atrium Measurements: (Indexed to BSA)  LA Diam 2D: 4.90 cm    Right Ventricle Measurements:     TAPSE:            2.4 cm  RV Base (RVID1):  4.9 cm  RV Mid (RVID2):   4.1 cm  RV Major (RVID3): 8.3 cm       LVOT / RVOT/ Qp/Qs Data: (Indexed to BSA)  LVOT Diameter: 2.00 cm  LVOT Vmax:     0.67 m/s  LVOT VTI:      19.00 cm  LVOT SV:       59.7 ml  22.86 ml/m²    Aortic Valve Measurements:  AV Vmax:                3.7 m/s  AV Peak Gradient:       53.6 mmHg  AV Mean Gradient:       26.0 mmHg  AV VTI:                 97.8 cm  AV VTI Ratio:           0.19  AoV EOA, Contin:      0.61 cm²  AoV EOA, Contin i:      0.23 cm²/m²  AoV area, 2D planim:    1.16 cm²  AoV Dimensionless Index 0.19    Mitral Valve Measurements:     MV E Vmax: 0.7 m/s  MV A Vmax: 1.0 m/s  MV E/A:    0.8       Tricuspid Valve Measurements:     TR Vmax:          3.6 m/s  TR Peak Gradient: 51.3 mmHg  RA Pressure:      8 mmHg  PASP:             59 mmHg    < end of copied text >    Catheterization:    Stress Test:  	  	  ASSESSMENT/PLAN: 	     CHIEF COMPLAINT: Aortic stenosis, 1st degree AVB, LBBB, 2:1 AVB    HISTORY OF PRESENT ILLNESS: 72yo woman with PMHx of HFrEF (EF 30%), mod AS, known 1st degree AVB and LBBB, HTN, IDDM1, CKD, hypothyroidism, chronic lymphedema, suspected OHS/LELIA on 3L NC home O2 p/w 1 episode of syncope. Recent admission at [facility] (3/[facility] for ADHF and DUNCAN s/p diuresis, discharged with new home O2 3L NC suspecting OHS/LELIA pending outpatient w/u.   Pt states she does not remember her syncopal episode. She remembers walking to the car and getting into it. She denies any preceding symptoms. She was told that she "blacked out" and was not responding for 10-15 seconds with arm shaking. There was documentation previously that pt was dyspneic upon getting into car after walking but pt did not recall that to me today.  Pt went to Cardiologist Dr. Nathan who recommended pt to come to ED.    EKG consistent with sinus bradycardia at 58bpm, 1st degree AVB (Sharath 318ms) with LBBB (QRSd 176). Early AM , she was noted to have 2:1 AV block. Pt also with e/o Wenckebach. She is being worked up for her aortic stenosis, likely severe in setting of decreased LVEF. She has significant LE edema and history of lymphedema with rash on lower legs. She was on Toprol XL 100mg at home but has not taken in since         Allergies    Ativan (Rash; Urticaria)  penicillins (Hives)    Intolerances    	    MEDICATIONS:  aspirin  chewable 81 milliGRAM(s) Oral daily  buMETAnide Infusion 0.5 mG/Hr IV Continuous <Continuous>  heparin   Injectable 5000 Unit(s) SubCutaneous every 8 hours          pantoprazole    Tablet 40 milliGRAM(s) Oral before breakfast    atorvastatin 80 milliGRAM(s) Oral at bedtime  dextrose 50% Injectable 12.5 Gram(s) IV Push once  dextrose 50% Injectable 25 Gram(s) IV Push once  dextrose Oral Gel 15 Gram(s) Oral once PRN  glucagon  Injectable 1 milliGRAM(s) IntraMuscular once  insulin glargine Injectable (LANTUS) 25 Unit(s) SubCutaneous at bedtime  insulin lispro (ADMELOG) corrective regimen sliding scale   SubCutaneous at bedtime  insulin lispro (ADMELOG) corrective regimen sliding scale   SubCutaneous three times a day before meals  levothyroxine 75 MICROGram(s) Oral daily    dextrose 10% Bolus 125 milliLiter(s) IV Bolus once  dextrose 5%. 1000 milliLiter(s) IV Continuous <Continuous>  dextrose 5%. 1000 milliLiter(s) IV Continuous <Continuous>  ferrous    sulfate 325 milliGRAM(s) Oral two times a day      PAST MEDICAL & SURGICAL HISTORY:  Hypertension      Diabetes      Lymphedema      H/O left bundle branch block      History of left bundle branch block (LBBB)      Systolic heart failure, chronic      Type 1 diabetes      H/O cataract        History of surgical removal of meniscus of knee  left in       Frozen shoulder        H/O Achilles tendon repair  lengthened bilaterally,       Fractured skull            FAMILY HISTORY:  Family history of CVA  mom, age 57        SOCIAL HISTORY:    [ ]Non-smoker  [ ] Smoker  [ ] Alcohol      REVIEW OF SYSTEMS:  See HPI. All ROS negative unless otherwise noted    PHYSICAL EXAM:  T(C): 36.9 (04-15-24 @ 04:09), Max: 36.9 (24 @ 23:49)  HR: 63 (04-15-24 @ 04:09) (57 - 63)  BP: 108/65 (04-15-24 @ 04:09) (108/64 - 114/70)  RR: 18 (04-15-24 @ 04:09) (17 - 18)  SpO2: 99% (04-15-24 @ 04:09) (99% - 99%)  Wt(kg): --  I&O's Summary    2024 07:01  -  15 Apr 2024 07:00  --------------------------------------------------------  IN: 960 mL / OUT: 2400 mL / NET: -1440 mL        Appearance: Alert. NAD	  HEENT:   NC/AT	  Cardiovascular: +S1S2 RRR, +systolic murmur heard best at USB  Respiratory: diminished at b/l bases	  Psychiatry: A & O x 3, Mood & affect appropriate  Gastrointestinal:  Soft	  Skin: No rashes	  Neurologic: Non-focal  Extremities: 3+ edema BLE with rash/chronic venous stasis changes      LABS:	 	    CBC Full  -  ( 15 Apr 2024 06:52 )  WBC Count : 5.22 K/uL  Hemoglobin : 10.3 g/dL  Hematocrit : 34.6 %  Platelet Count - Automated : 183 K/uL  Mean Cell Volume : 93.3 fl  Mean Cell Hemoglobin : 27.8 pg  Mean Cell Hemoglobin Concentration : 29.8 gm/dL  Auto Neutrophil # : 3.35 K/uL  Auto Lymphocyte # : 0.63 K/uL  Auto Monocyte # : 0.71 K/uL  Auto Eosinophil # : 0.40 K/uL  Auto Basophil # : 0.08 K/uL  Auto Neutrophil % : 64.1 %  Auto Lymphocyte % : 12.1 %  Auto Monocyte % : 13.6 %  Auto Eosinophil % : 7.7 %  Auto Basophil % : 1.5 %    04-15    137  |  96  |  66<H>  ----------------------------<  179<H>  4.3   |  29  |  2.60<H>      133<L>  |  94<L>  |  66<H>  ----------------------------<  242<H>  4.9   |  28  |  2.69<H>    Ca    8.6      15 Apr 2024 06:52  Ca    9.2      2024 19:53  Phos  4.1     -15  Phos  4.3     -14  Mg     2.4     -15  Mg     2.5     -14    TPro  6.9  /  Alb  3.3  /  TBili  0.6  /  DBili  x   /  AST  15  /  ALT  8<L>  /  AlkPhos  63  -15  TPro  6.9  /  Alb  3.2<L>  /  TBili  0.5  /  DBili  x   /  AST  13  /  ALT  7<L>  /  AlkPhos  64  04-14      proBNP: Pro-Brain Natriuretic Peptide (24 @ 14:23)    Pro-Brain Natriuretic Peptide: 45327 pg/mL    TSH: Thyroid Stimulating Hormone, Serum in AM (24 @ 07:18)    Thyroid Stimulating Hormone, Serum: 5.06 uIU/mL    Free Thyroxine, Serum in AM (24 @ 07:18)    Free Thyroxine, Serum: 1.3 ng/dL    CARDIAC MARKERS: Troponin T, High Sensitivity (24 @ 15:33)    Troponin T, High Sensitivity Result: 25          TELEMETRY: NSR/SB 50-60s. Wenckebach episodes noted last night ~ and yesterday PM ~1251. 2:1 AVB noted on 12 lead EKG from  AM	    EC24 at 0625: 2:1 AV block with VHR 39bpm, LBBB, SD ~320 on conducted beats	  RADIOLOGY:  OTHER: 	    PREVIOUS DIAGNOSTIC TESTING:    Echocardiogram:    < from: TTE W or WO Ultrasound Enhancing Agent (24 @ 10:59) >  CONCLUSIONS:      1. Technically difficult image quality.   2. Left ventricular systolic function is moderately to severely decreased with an ejection fraction of 30 % by Nunes's method of disks.   3. Mildto moderate left ventricular hypertrophy.   4. The right ventricle is not well visualized. Based on visual assessment, the right ventricle appears mildly enlarged. mildly reduced systolic function.   5. The left atrium is mildly dilated.   6. Trace aortic regurgitation.   7. Mild mitral regurgitation.   8. Moderate to severe aortic stenosis. There is a Vmax of 3.66m/s, Mean gradient of 26mmHg and a DI of 0.19. The JUAN DANIEL is calculated as 0.61cm2 by continuity. This likely represents low flow low gradient AS.   9. Estimated pulmonary artery systolic pressure is 59 mmHg, consistent with moderate pulmonary hypertension.  10. The inferior vena cava is dilated measuring 2.60 cm in diameter, (dilated >2.1cm) with normal inspiratory collapse (normal >50%) consistent with mildly elevated right atrial pressure (~8, range 5-10mmHg).    ________________________________________________________________________________________  FINDINGS:     Left Ventricle:  Left ventricular systolic function is moderately to severely decreased with a calculated ejection fraction of 30 % by the Nunes's biplane method of disks. Mild to moderate left ventricular hypertrophy.     Right Ventricle:  The right ventricle is not well visualized. Based on visual assessment, the right ventricle appears mildly enlarged. The right ventricular cavity is mildly enlarged in size and mildly reduced systolic function. Tricuspid annular plane systolic excursion (TAPSE) is 2.4 cm (normal >=1.7 cm).     Left Atrium:  The left atrium is mildly dilated with an indexed volume of 34.20 ml/m².     Right Atrium:  The right atrium is normal in size.     Aortic Valve:  There is calcification of the aortic valve leaflets. There is moderate to severe aortic stenosis. The peak transaorticvelocity is 3.66 m/s, peak transaortic gradient is 53.6 mmHg and mean transaortic gradient is 26.0 mmHg with an LVOT/aortic valve VTI ratio of 0.19. The aortic valve area is estimated at 0.61 cm² by the continuity equation and 1.16 cm² by 2D planimetry. There is trace aortic regurgitation.     Mitral Valve:  There is mild calcification of the mitral valve annulus. Mitral valve leaflets are diffusely calcified. There is mild mitral regurgitation.     Tricuspid Valve:  There is mild tricuspid regurgitation. Estimated pulmonary artery systolic pressure is 59 mmHg, consistent with moderate pulmonary hypertension.     Pulmonic Valve:  The pulmonic valve was not well visualized. There is trace pulmonic regurgitation.     Aorta:  The aortic root at the sinuses of Valsalva is normal in size, measuring 2.90 cm (indexed 1.11 cm/m²).     Systemic Veins:  The inferior vena cava is dilated measuring 2.60 cm in diameter, (dilated >2.1cm) with normal inspiratory collapse (normal >50%) consistent with mildly elevated right atrial pressure (~8, range 5-10mmHg).  ____________________________________________________________________  QUANTITATIVE DATA:  Left Ventricle Measurements: (Indexed to BSA)     IVSd (2D):   1.3 cm  LVPWd (2D):  1.2 cm  LVIDd (2D):  5.4 cm  LVIDs (2D):  4.7 cm  LV Mass:     284 g  108.7 g/m²  LV Vol d, MOD A2C: 138.0 ml 52.85 ml/m²  LV Vol d, MOD A4C: 114.0 ml 43.65 ml/m²  LV Vol d, MOD BP:  128.3 ml 49.14 ml/m²  LV Vol s, MOD A2C: 86.9 ml  33.28 ml/m²  LV Vol s, MOD A4C: 88.5ml  33.89 ml/m²  LV Vol s, MOD BP:  89.5 ml  34.28 ml/m²  LVOT SV MOD BP:    38.8 ml  LV EF% MOD BP:     30 %     MV E Vmax:    0.75 m/s  MV A Vmax:    0.97 m/s  MV E/A:       0.77  e' lateral:   8.16 cm/s  e' medial:    3.59 cm/s  E/e' lateral: 9.18  E/e' medial:  20.86  E/e' Average: 12.75  MV DT:        268 msec    Aorta Measurements: (Normal range) (Indexed to BSA)     Sinuses of Valsalva: 2.90 cm (2.7 - 3.3 cm)       Left Atrium Measurements: (Indexed to BSA)  LA Diam 2D: 4.90 cm    Right Ventricle Measurements:     TAPSE:            2.4 cm  RV Base (RVID1):  4.9 cm  RV Mid (RVID2):   4.1 cm  RV Major (RVID3): 8.3 cm       LVOT / RVOT/ Qp/Qs Data: (Indexed to BSA)  LVOT Diameter: 2.00 cm  LVOT Vmax:     0.67 m/s  LVOT VTI:      19.00 cm  LVOT SV:       59.7 ml  22.86 ml/m²    Aortic Valve Measurements:  AV Vmax:                3.7 m/s  AV Peak Gradient:       53.6 mmHg  AV Mean Gradient:       26.0 mmHg  AV VTI:                 97.8 cm  AV VTI Ratio:           0.19  AoV EOA, Contin:      0.61 cm²  AoV EOA, Contin i:      0.23 cm²/m²  AoV area, 2D planim:    1.16 cm²  AoV Dimensionless Index 0.19    Mitral Valve Measurements:     MV E Vmax: 0.7 m/s  MV A Vmax: 1.0 m/s  MV E/A:    0.8       Tricuspid Valve Measurements:     TR Vmax:          3.6 m/s  TR Peak Gradient: 51.3 mmHg  RA Pressure:      8 mmHg  PASP:             59 mmHg    < end of copied text >    Catheterization:   < from: Cardiac Catheterization (22 @ 14:43) >  Diagnostic Conclusions:   Coronary angiography demonstrated minor luminal irregularities       Procedure Narrative:   The risks and alternatives of the procedures and conscious sedation  were explained to the patient and informed consent was  obtained. The patient was brought to the cath lab and placed on the  exam table.  Access   Right radial artery:   The puncture site was infiltrated with 1% Lidocaine. Vascular access  was obtained using modified seldinger technique.    Diagnostic Findings:     Coronary Angiography   The coronary circulation is right dominant.      LM   Left main artery: The segment is visually normal in size and  structure. Angiography shows no disease.    LAD   Left anterior descending artery: Angiography shows minor  irregularities. First diagonal: Angiography shows minor  irregularities.    CX   Circumflex: Angiography shows minor irregularities. First obtuse  marginal: Angiography shows minor irregularities.    RCA   Right coronary artery: The segment is large, dominant. Angiography  shows minor irregularities.    < end of copied text >  	          Stress Test:  	  	  ASSESSMENT/PLAN:

## 2024-04-15 NOTE — PROGRESS NOTE ADULT - PROBLEM SELECTOR PLAN 7
home lantus 33u qhs    - Lantus 25u QHS, QUE, FSG ACHS or q6h if NPO; goal glucose   - CTM Dexcom for glucose monitoring

## 2024-04-15 NOTE — PROGRESS NOTE ADULT - PROBLEM SELECTOR PLAN 4
Unclear baseline SCr likely 2.1-2.2  Now with increasing Cr.   Fe 1.6, indeterminant       - trend BUN/SCr, avoid nephrotoxic, renally dose all meds  - strict I/Os  - Nephro following

## 2024-04-15 NOTE — PROGRESS NOTE ADULT - PROBLEM SELECTOR PLAN 2
mod to severe AS    Plan:  -structural cards following - plan to obtain CTA heart, however iso DUNCAN, nephro recs appareciated mod to severe AS    Plan:  -structural cards following - CTA imagings for TAVR

## 2024-04-15 NOTE — CONSULT NOTE ADULT - ASSESSMENT
73F w HFrEF (EF 30%), mod AS, known LBBB, HTN, IDDM1, CKD, hypothyroidism, chronic lymphedema, p/w 1 episode of syncope with R arm jerking, likely 2/2 severe symptomatic AS. Pending CTA imagings for TAVR eval, c/b DUNCAN on CKD, on bumex gtt.      Wound Consult requested to assist w/ management of Psoriasis  BLE Lymphedema  Incontinence Dermatitis Of buttocks     Consider Derm Consult for Psoriasis  BLE elevation & Compression  Moisturize intact skin w/ SWEEN cream BID  Nutrition Consult for optimization        encourage high quality protein, cynthia/ prosource, MVI & Vit C to promote wound healing  Hyperglycemia -  ADA diet and NPH & FS w/ ISS  Continue turning and positioning w/ offloading assistive devices as per protocol  Buttocks/ Sacrum Patito BID and prn soiling        Continue w/ attends under pads and Pericare as per protocol  Waffle Cushion to chair when oob to chair  Continue w/ low air loss pressure redistribution bed surface   Pt will need Group 2 mattress on hospital bed and ROHO cushion for wheel chair upon discharge home  Care as per medicine, will follow w/ you  Upon discharge f/u as outpatient at [facility] [address] [place], [place] [phone]  D/w team & RN & attng  Thank you for this consult  Staci Lau PA-C CWS [id number]  Nights/ Weekends/ Holidays please call:  General Surgery Consult pager ([phone]) for emergencies  Wound PT for multilayer leg wrapping or VAC issues ([phone])   I spent 55minutes face to face w/ this pt of which more than 50% of the time was spent counseling & coordinating care of this pt.

## 2024-04-15 NOTE — PROGRESS NOTE ADULT - ATTENDING COMMENTS
73F PMH HFrEF (LVEF 30%), moderate AS, HTN, DM1.5, CKD3, hypothyroidism, chronic lymphadema, presents with episode of syncope. Notably, patient was recently discharged from [facility] about a week prior to admission. On day of admission, patient was with episode of shortness of breath, then sat down in car had had witnessed syncopal episode which lasted 10-15 seconds. No postictal state or incontinence.     #Syncope  - CT head noncon  - Carotid ultrasound   - Structural cardiology consult given results of most recent echo, cannot rule out AS  - Orthostatics   - Continue to monitor on tele, may warrant Zio patch as patient previously had halter, however no events recorded  - d/w structural, nephrology and patient- given reasonable risk to kidneys with contrast and possible TAVR; pt amenable to possibly needing HD  - will proceed with TAVR workup and imaging once renal function optimized understanding ongoing risk of possible HD    #Acute CHF  - Strict IO, daily standing weights  - switched to bumex gtt per cardiology recs. monitor bmp q12h  - monitor Cr closely. check UA, bladder scan    #DUNCAN on CKD- modestly improving, continue bumex gtt    #Hyperglycemia, DMT2, uptitrate lantus to 25u hs and reasses    #Recheck TSH    The necessity of the time spent during the encounter on this date of service was due to:   - Ordering, reviewing, and interpreting labs, testing, and imaging.  - Independently obtaining a review of systems and performing a physical exam  - Reviewing prior hospitalization and where necessary, outpatient records.  - Counselling and educating patient and family regarding interpretation of aforementioned items and plan of care.    Time-based billing (NON-critical care). Total minutes spent: 57

## 2024-04-15 NOTE — PROGRESS NOTE ADULT - PROBLEM SELECTOR PLAN 3
TTE (4/2024) LVEF 30% w mod LVH, mildly reduced RV, AS (0.61 cm2), mod pHTN.   On home Torsemide 40 mg daily, Toprol  mg daily, Farxiga 5mg daily. Follows w Dr. Cherise rosas eventual plan to start further GDMT as renally tolerated  proBNP 51K from 80k.     - strict I/Os, daily weights, fluid restrict 1.5L/day  - dc'd home torsemide 40mg daily  - Started bumex gtt 0.5mg/hr

## 2024-04-15 NOTE — CONSULT NOTE ADULT - ASSESSMENT
74yo woman with PMHx of HFrEF (EF 30%), mod AS, known 1st degree AVB and LBBB, HTN, IDDM1, CKD, hypothyroidism, chronic lymphedema, suspected OHS/LELIA on 3L NC home O2 p/w 1 episode of syncope. Recent admission at [facility] (3/29-4/5) for ADHF and DUNCAN s/p diuresis, discharged with new home O2 3L NC suspecting OHS/LELIA pending outpatient w/u.   Pt states she does not remember her syncopal episode. She remembers walking to the car and getting into it. She denies any preceding symptoms. She was told that she "blacked out" and was not responding for 10-15 seconds with arm shaking. There is documentation that pt was dyspneic upon getting into car after walking but pt did not recall that on EP consultation 4/15.  Pt went to her cardiologist Dr. Nathan who recommended pt to come to ED.    EKG consistent with sinus bradycardia at 58bpm, 1st degree AVB (Sharath 318ms) with LBBB (QRSd 176). Early AM 4/14, she was noted to have 2:1 AV block. Pt also with e/o Wenckebach several times throughout the day on 4/14. She is being worked up for her aortic stenosis, likely severe in setting of decreased LVEF. She has significant LE edema and history of lymphedema with rash on lower legs. She was on Toprol XL 100mg at home but has not taken in since 4/12    1) Syncope  2) 2:1 AV block with known 1st degree AVB (Sharath 318ms) with LBBB (QRSd 176)  3) HFrEF with LVEF 30%  4) Aortic stenosis, likely severe in setting of decreased LVEF  5) Lymphedema with significant LE edema and rash    Recommendations:  -Structural heart team evaluation for aortic stenosis  -Pt currently on Bumex gtt  -Derm consult pending for LE rash  -Continue telemetry monitoring  -Noting conduction disease and cardiomyopathy with LVEF 30% and LBBB with QRSd 176, pt may warrant CRT-D placement. Seems pt needs optimization prior to any such procedure noting LE rash and currently on bumex gtt    *Note incomplete 74yo woman with PMHx of HFrEF (EF 30%), mod AS, known 1st degree AVB and LBBB, HTN, IDDM1, CKD, hypothyroidism, chronic lymphedema, suspected OHS/LELIA on 3L NC home O2 p/w 1 episode of syncope. Recent admission at [facility] (3/29-4/5) for ADHF and DUNCAN s/p diuresis, discharged with new home O2 3L NC suspecting OHS/LELIA pending outpatient w/u.   Pt states she does not remember her syncopal episode. She remembers walking to the car and getting into it. She denies any preceding symptoms. She was told that she "blacked out" and was not responding for 10-15 seconds with arm shaking. There is documentation that pt was dyspneic upon getting into car after walking but pt did not recall that on EP consultation 4/15.  Pt went to her cardiologist Dr. Nathan who recommended pt to come to ED.    EKG consistent with sinus bradycardia at 58bpm, 1st degree AVB (Sharath 318ms) with LBBB (QRSd 176). Early AM 4/14, she was noted to have 2:1 AV block. Pt also with e/o Wenckebach several times throughout the day on 4/14. She is being worked up for her aortic stenosis, likely severe in setting of decreased LVEF. She has significant LE edema and history of lymphedema with rash on lower legs. She was on Toprol XL 100mg at home but has not taken in since 4/12    1) Syncope  2) 2:1 AV block with known 1st degree AVB (Sharath 318ms) with LBBB (QRSd 176)  3) HFrEF with LVEF 30%  4) Aortic stenosis, likely severe in setting of decreased LVEF  5) Lymphedema with significant LE edema and rash    Recommendations:  -Structural heart team evaluation for aortic stenosis  -Pt currently on Bumex gtt  -Derm consult pending for LE rash  -Continue telemetry monitoring  -Noting conduction disease and cardiomyopathy with LVEF 30% and LBBB with QRSd 176, pt may warrant CRT-D placement. Seems pt needs optimization prior to any such procedure noting LE rash (Infections risk) and currently on bumex gtt. She is at risk for requiring HD with TAVR workup underway with Cr 2.6

## 2024-04-15 NOTE — PROGRESS NOTE ADULT - SUBJECTIVE AND OBJECTIVE BOX
[facility] - Howard Kimble, Carlos Luong, Herman Alston  Office Number:  [phone]    Patient resting comfortably in bed in NAD.    breathing better overall  no chest pain or dyspnea  on bumex gtt    ROS: negative unless otherwise mentioned.    Telemetry:  sr, episodes of mobitz I/ 2:1 overnight    MEDICATIONS  (STANDING):  aspirin  chewable 81 milliGRAM(s) Oral daily  atorvastatin 80 milliGRAM(s) Oral at bedtime  buMETAnide Infusion 0.5 mG/Hr (2.5 mL/Hr) IV Continuous <Continuous>  dextrose 10% Bolus 125 milliLiter(s) IV Bolus once  dextrose 5%. 1000 milliLiter(s) (100 mL/Hr) IV Continuous <Continuous>  dextrose 5%. 1000 milliLiter(s) (50 mL/Hr) IV Continuous <Continuous>  dextrose 50% Injectable 12.5 Gram(s) IV Push once  dextrose 50% Injectable 25 Gram(s) IV Push once  ferrous    sulfate 325 milliGRAM(s) Oral two times a day  glucagon  Injectable 1 milliGRAM(s) IntraMuscular once  heparin   Injectable 5000 Unit(s) SubCutaneous every 8 hours  insulin glargine Injectable (LANTUS) 25 Unit(s) SubCutaneous at bedtime  insulin lispro (ADMELOG) corrective regimen sliding scale   SubCutaneous at bedtime  insulin lispro (ADMELOG) corrective regimen sliding scale   SubCutaneous three times a day before meals  levothyroxine 75 MICROGram(s) Oral daily  pantoprazole    Tablet 40 milliGRAM(s) Oral before breakfast    MEDICATIONS  (PRN):  dextrose Oral Gel 15 Gram(s) Oral once PRN Blood Glucose LESS THAN 70 milliGRAM(s)/deciliter      Allergies    Ativan (Rash; Urticaria)  penicillins (Hives)    Intolerances        Vital Signs Last 24 Hrs  T(C): 36.9 (15 Apr 2024 04:09), Max: 36.9 (14 Apr 2024 23:49)  T(F): 98.4 (15 Apr 2024 04:09), Max: 98.5 (14 Apr 2024 23:49)  HR: 63 (15 Apr 2024 04:09) (57 - 63)  BP: 108/65 (15 Apr 2024 04:09) (108/64 - 114/70)  BP(mean): --  RR: 18 (15 Apr 2024 04:09) (17 - 18)  SpO2: 99% (15 Apr 2024 04:09) (99% - 99%)    Parameters below as of 15 Apr 2024 04:09  Patient On (Oxygen Delivery Method): nasal cannula  O2 Flow (L/min): 3      I&O's Summary    14 Apr 2024 07:01  -  15 Apr 2024 07:00  --------------------------------------------------------  IN: 960 mL / OUT: 2400 mL / NET: -1440 mL        ON EXAM:    General: NAD, awake and alert, oriented x 3, obese  HEENT: Mucous membranes are moist, anicteric  Lungs: Non-labored, breath sounds are decreased bilaterally, No wheezing, rales or rhonchi  Cardiovascular: Regular, S1 and S2, +SM at ru/lusb  Gastrointestinal: Bowel Sounds present, soft, nontender.   Lymph: chronic moderate peripheral edema with venous changes and rash. No lymphadenopathy.  Skin: rash on legs  Psych:  Mood & affect appropriate    LABS: All Labs Reviewed:                        10.3   5.22  )-----------( 183      ( 15 Apr 2024 06:52 )             34.6                         10.0   5.03  )-----------( 186      ( 14 Apr 2024 07:18 )             33.3                         10.0   4.89  )-----------( 175      ( 13 Apr 2024 07:05 )             33.7     15 Apr 2024 06:52    137    |  96     |  66     ----------------------------<  179    4.3     |  29     |  2.60   14 Apr 2024 19:53    133    |  94     |  66     ----------------------------<  242    4.9     |  28     |  2.69   14 Apr 2024 07:17    133    |  93     |  66     ----------------------------<  154    4.3     |  29     |  2.72     Ca    8.6        15 Apr 2024 06:52  Ca    9.2        14 Apr 2024 19:53  Ca    8.3        14 Apr 2024 07:17  Phos  4.1       15 Apr 2024 06:52  Phos  4.3       14 Apr 2024 07:17  Phos  4.1       13 Apr 2024 07:05  Mg     2.4       15 Apr 2024 06:52  Mg     2.5       14 Apr 2024 07:17  Mg     2.4       13 Apr 2024 07:05    TPro  6.9    /  Alb  3.3    /  TBili  0.6    /  DBili  x      /  AST  15     /  ALT  8      /  AlkPhos  63     15 Apr 2024 06:52  TPro  6.9    /  Alb  3.2    /  TBili  0.5    /  DBili  x      /  AST  13     /  ALT  7      /  AlkPhos  64     14 Apr 2024 07:17  TPro  6.9    /  Alb  3.2    /  TBili  0.5    /  DBili  x      /  AST  15     /  ALT  8      /  AlkPhos  65     13 Apr 2024 07:05          Blood Culture:     04-14 @ 07:18  TSH: 5.06  04-13 @ 07:05  TSH: 4.79

## 2024-04-15 NOTE — CONSULT NOTE ADULT - SUBJECTIVE AND OBJECTIVE BOX
Wound SURGERY CONSULT NOTE    HPI:  73F w HFrEF (EF 30%), mod AS, known LBBB, HTN, IDDM1, CKD, hypothyroidism, chronic lymphedema, suspected OHS/LELIA on 3L NC home O2 p/w 1 episode of syncope. Recent admission at [facility] (3/29-4/5) for ADHF and DUNCAN s/p diuresis, discharged with new home O2 3L NC suspecting OHS/LELIA pending outpatient w/u. Since discharge a week ago, she has been staying at home with   Pt had sob walking to car. Once in car, she was still breathing heavily. Partner noticed pt was not responding to verbal stimuli for 10-15sec and witnessed R arm jerking. Pt gained consciousness quickly without postictal symptoms. Pt went to Cardiologist Dr. Nathan who recommended pt to come to ED. No fever, cp, abd pain, n/v. Leg swelling is improved from prior. Pt on torsemide 40mg which she has been taking. Pt was discharged on 3LNC which she is currently on. Patient reports she did not take Farxiga that she was discharged on as she was concerned about side effects.     In ED, patient was found afebrile, hemodynamically stable, breathing well on 3L NC. Initial labs notable for mild hyponatremia, DUNCAN on CKD, elevated proBNP and elevated pCO2 both improved from prior. (12 Apr 2024 16:31)        N/V/D,  BM/ Flatus,   NGT,     palp/ sob/dyspnea/ cp,       F/C/S  Wound consult requested by team to assist w/ management of      wound/ pressure injury.   Pt (unable to)  c/o pain, drainage, odor, color change,  or worsening swelling. Offloading and pericare initiated upon admission as pt Increasingly sedentary 2/2 to illness. Pt is Incontinent of urine & stool. (+)hardy/ ostomy.   No h/o bites, scratches, falls, trauma.  Pt seen by Wound RN  CAVILON Advance/  Patito,TRIAD/ Aquacell/ medihoney/ Allevyn foam/ dakins/ Adaptic/ DSD recommended used at home/ while awaiting consult.  Appetite good/ decreased.  weight loss.  S&S / RD consult appreciated All questions asked and answered to pt's and family's expressed understanding and satisfaction.    Current Diet: Diet, Regular:   Consistent Carbohydrate Evening Snack (CSTCHOSN)  DASH/TLC Sodium & Cholesterol Restricted (DASH)  1500mL Fluid Restriction ([id number]) (04-12-24 @ 18:32)      PAST MEDICAL & SURGICAL HISTORY:  Hypertension      Diabetes      Lymphedema      H/O left bundle branch block      History of left bundle branch block (LBBB)      Systolic heart failure, chronic      Type 1 diabetes      H/O cataract  2020      History of surgical removal of meniscus of knee  left in 1971      Frozen shoulder  2000      H/O Achilles tendon repair  lengthened bilaterally, 2000      Fractured skull    REVIEW OF SYSTEMS: PGeneral/Skin/Vasc/  MSK: see HPI  All other systems negative    MEDICATIONS  (STANDING):  aspirin  chewable 81 milliGRAM(s) Oral daily  atorvastatin 80 milliGRAM(s) Oral at bedtime  buMETAnide Infusion 0.5 mG/Hr (2.5 mL/Hr) IV Continuous <Continuous>  dextrose 10% Bolus 125 milliLiter(s) IV Bolus once  dextrose 5%. 1000 milliLiter(s) (100 mL/Hr) IV Continuous <Continuous>  dextrose 5%. 1000 milliLiter(s) (50 mL/Hr) IV Continuous <Continuous>  dextrose 50% Injectable 12.5 Gram(s) IV Push once  dextrose 50% Injectable 25 Gram(s) IV Push once  ferrous    sulfate 325 milliGRAM(s) Oral two times a day  glucagon  Injectable 1 milliGRAM(s) IntraMuscular once  heparin   Injectable 5000 Unit(s) SubCutaneous every 8 hours  insulin glargine Injectable (LANTUS) 25 Unit(s) SubCutaneous at bedtime  insulin lispro (ADMELOG) corrective regimen sliding scale   SubCutaneous at bedtime  insulin lispro (ADMELOG) corrective regimen sliding scale   SubCutaneous three times a day before meals  levothyroxine 75 MICROGram(s) Oral daily  nystatin Powder 1 Application(s) Topical two times a day  pantoprazole    Tablet 40 milliGRAM(s) Oral before breakfast    MEDICATIONS  (PRN):  dextrose Oral Gel 15 Gram(s) Oral once PRN Blood Glucose LESS THAN 70 milliGRAM(s)/deciliter      Allergies  Ativan (Rash; Urticaria)  penicillins (Hives)      SOCIAL HISTORY:  / /single/ ; (+)HHA/ lives in SNF; Former smoker, No current/ Denies smoking, ETOH, drugs    FAMILY HISTORY:  Family history of CVA  mom, age 57     no h/o PVD or wound healing or skin/ significant problems    PHYSICAL EXAM:  Vital Signs Last 24 Hrs  T(C): 37 (15 Apr 2024 11:50), Max: 37 (15 Apr 2024 11:50)  T(F): 98.6 (15 Apr 2024 11:50), Max: 98.6 (15 Apr 2024 11:50)  HR: 62 (15 Apr 2024 11:50) (62 - 63)  BP: 112/60 (15 Apr 2024 11:50) (108/65 - 114/70)  BP(mean): --  RR: 18 (15 Apr 2024 11:50) (17 - 18)  SpO2: 99% (15 Apr 2024 11:50) (99% - 99%)    Parameters below as of 15 Apr 2024 11:50  Patient On (Oxygen Delivery Method): nasal cannula        NAD, Guarded but stable,  A&Ox3/ Alert/ Confused  cachectic/ thin, MO/ Obese, frail,  WD/ WN/ WG,  Disheveled  Total Care Sport/ Versa Care P500 / Envella Progressa bed     HEENT:  NC/AT, PERRL, EOMI, sclera clear, mucosa moist, throat clear, trachea midline, neck supple, trach  Respiratory: nonlabored w/ equal chest rise  Gastrointestinal: soft NT/ND (+)BS  (+)PEG (+)ostomy (+)NGT  : (+)hardy/ purewick/ condom cath  Neurology:  weakened strength & sensation grossly intact, paraesthesia  nonverbal, no follow commands, paraplegic  Psych: calm/ appropriate/ flat affect/ easily agitated/ restless/ anxious/ difficult to assess  Musculoskeletal:  limited stiff / p/FROM, no deformities/ contractures  Vascular: BLE equally warm/ cool,  no cyanosis, clubbing, edema nor acute ischemia           >LE //BLE edema equal           BLE DP/PT pulses palpable          BLE hemosiderin staining/ varicose veins  Skin:  moist w/ good turgor  thin, dry, pale, frail,  ecchymosis w/o hematoma  blistering  or serosanguinous drainage  No odor, erythema, increased warmth, tenderness, induration, fluctuance, nor crepitus    LABS/ CULTURES/ RADIOLOGY:                        10.3   5.22  )-----------( 183      ( 15 Apr 2024 06:52 )             34.6       137  |  96  |  66  ----------------------------<  179      [04-15-24 @ 06:52]  4.3   |  29  |  2.60        Ca     8.6     [04-15-24 @ 06:52]      Mg     2.4     [04-15-24 @ 06:52]      Phos  4.1     [04-15-24 @ 06:52]    TPro  6.9  /  Alb  3.3  /  TBili  0.6  /  DBili  x   /  AST  15  /  ALT  8   /  AlkPhos  63  [04-15-24 @ 06:52]    A1C with Estimated Average Glucose Result: 7.4 % (03-30-24 @ 08:21)  A1C with Estimated Average Glucose Result: 6.8 % (01-10-24 @ 08:37)         Wound SURGERY CONSULT NOTE    HPI:  73F w HFrEF (EF 30%), mod AS, known LBBB, HTN, IDDM1, CKD, hypothyroidism, chronic lymphedema, suspected OHS/LELIA on 3L NC home O2 p/w 1 episode of syncope. Recent admission at \[facility]\"" (3/29-4/5) for ADHF and DUNCAN s/p diuresis, discharged with new home O2 3L NC suspecting OHS/LELIA pending outpatient w/u. Since discharge a week ago, she has been staying at home with   Pt had sob walking to car. Once in car, she was still breathing heavily. Partner noticed pt was not responding to verbal stimuli for 10-15sec and witnessed R arm jerking. Pt gained consciousness quickly without postictal symptoms. Pt went to Cardiologist Dr. Nathan who recommended pt to come to ED. No fever, cp, abd pain, n/v. Leg swelling is improved from prior. Pt on torsemide 40mg which she has been taking. Pt was discharged on 3LNC which she is currently on. Patient reports she did not take Farxiga that she was discharged on as she was concerned about side effects.     In ED, patient was found afebrile, hemodynamically stable, breathing well on 3L NC. Initial labs notable for mild hyponatremia, DUNCAN on CKD, elevated proBNP and elevated pCO2 both improved from prior.     Wound consult requested by team to assist w/ management of BLE wound. Pt noted with bleeding psoriatic patch on back- no longer bleeding. Legs were draining but not currently.  Pt w/o c/o pain, drainage, odor, color change, or worsening swelling. Offloading and pericare initiated upon admission.. Pt is Incontinent of urine & stool.   No new h/o bites, scratches, falls, trauma.  Pt w/ h/o psoriasis. Pt went to [facility] yrs ago for psoriasis.  Rx for cream that didn't work work well so she stopped using it. Using aloe cream- helps with itchy skin but still has plaques.  Pt has lymphedema pump and compression wraps at home- states she is compliant. pt had wound onhis foot recently that healed. Appetite good.  All questions asked and answered to pt's expressed understanding and satisfaction.    Current Diet: Diet, Regular:   Consistent Carbohydrate Evening Snack (CSTCHOSN)  DASH/TLC Sodium & Cholesterol Restricted (DASH)  1500mL Fluid Restriction ([id number]) (04-12-24 @ 18:32)      PAST MEDICAL & SURGICAL HISTORY:  Hypertension    Diabetes    Lymphedema    left bundle branch block (LBBB)    Systolic heart failure, chronic    Type 1 diabetes    s/p cataract surgery    s/p surgical removal of meniscus of knee    Frozen shoulder    s/p Achilles tendon repair    Fractured skull    REVIEW OF SYSTEMS: General/ Skin/ Vasc/ MSK: see HPI  All other systems negative    MEDICATIONS  (STANDING):  aspirin  chewable 81 milliGRAM(s) Oral daily  atorvastatin 80 milliGRAM(s) Oral at bedtime  buMETAnide Infusion 0.5 mG/Hr (2.5 mL/Hr) IV Continuous <Continuous>  dextrose 10% Bolus 125 milliLiter(s) IV Bolus once  dextrose 5%. 1000 milliLiter(s) (100 mL/Hr) IV Continuous <Continuous>  dextrose 5%. 1000 milliLiter(s) (50 mL/Hr) IV Continuous <Continuous>  dextrose 50% Injectable 12.5 Gram(s) IV Push once  dextrose 50% Injectable 25 Gram(s) IV Push once  ferrous    sulfate 325 milliGRAM(s) Oral two times a day  glucagon  Injectable 1 milliGRAM(s) IntraMuscular once  heparin   Injectable 5000 Unit(s) SubCutaneous every 8 hours  insulin glargine Injectable (LANTUS) 25 Unit(s) SubCutaneous at bedtime  insulin lispro (ADMELOG) corrective regimen sliding scale   SubCutaneous at bedtime  insulin lispro (ADMELOG) corrective regimen sliding scale   SubCutaneous three times a day before meals  levothyroxine 75 MICROGram(s) Oral daily  nystatin Powder 1 Application(s) Topical two times a day  pantoprazole Tablet 40 milliGRAM(s) Oral before breakfast    MEDICATIONS  (PRN):  dextrose Oral Gel 15 Gram(s) Oral once PRN Blood Glucose LESS THAN 70 milliGRAM(s)/deciliter      Allergies  Ativan (Rash; Urticaria)  penicillins (Hives)      SOCIAL HISTORY:  lives w/ partner;  Denies smoking, ETOH, drugs    FAMILY HISTORY: CVA   no h/o PVD or wound healing or skin problems    PHYSICAL EXAM:  Vital Signs Last 24 Hrs  T(C): 37 (15 Apr 2024 11:50), Max: 37 (15 Apr 2024 11:50)  T(F): 98.6 (15 Apr 2024 11:50), Max: 98.6 (15 Apr 2024 11:50)  HR: 62 (15 Apr 2024 11:50) (62 - 63)  BP: 112/60 (15 Apr 2024 11:50) (108/65 - 114/70)  BP(mean): --  RR: 18 (15 Apr 2024 11:50) (17 - 18)  SpO2: 99% (15 Apr 2024 11:50) (99% - 99%)    Parameters below as of 15 Apr 2024 11:50  Patient On (Oxygen Delivery Method): nasal cannula      NAD,   A&Ox3, MO  WD/ WN/ WG,    Versa Care P500 bed  HEENT:  NC/AT, EOMI, sclera clear, mucosa moist, throat clear, trachea midline, neck supple  Respiratory: nonlabored w/ equal chest rise  Gastrointestinal: soft NT/ND   Neurology:   strength & sensation grossly intact,   Psych: calm/ appropriate  Musculoskeletal: FROM, no deformities/ contractures  Vascular: BLE equally warm,  no cyanosis, clubbing, nor acute ischemia       BLE edema equal       BLE DP pulses palpable       BLE hemosiderin staining     w/o open skin,  blistering  or  drainage  No odor, erythema, increased warmth, tenderness, induration, fluctuance, nor crepitus  Skin:  thin, dry, pale, frail,  scattered psoriatic plaques throughout extremities and torso  bilateral buttock w/ erythema  w/o blistering  or  drainage  No odor, erythema, increased warmth, tenderness, induration, fluctuance, nor crepitus    LABS/ CULTURES/ RADIOLOGY:                        10.3   5.22  )-----------( 183      ( 15 Apr 2024 06:52 )             34.6       137  |  96  |  66  ----------------------------<  179      [04-15-24 @ 06:52]  4.3   |  29  |  2.60        Ca     8.6     [04-15-24 @ 06:52]      Mg     2.4     [04-15-24 @ 06:52]      Phos  4.1     [04-15-24 @ 06:52]    TPro  6.9  /  Alb  3.3  /  TBili  0.6  /  DBili  x   /  AST  15  /  ALT  8   /  AlkPhos  63  [04-15-24 @ 06:52]    A1C with Estimated Average Glucose Result: 7.4 % (03-30-24 @ 08:21)  A1C with Estimated Average Glucose Result: 6.8 % (01-10-24 @ 08:37)

## 2024-04-16 NOTE — PROGRESS NOTE ADULT - ASSESSMENT
73F w HFrEF (EF 30%), mod AS, known LBBB, HTN, IDDM1, CKD, hypothyroidism, chronic lymphedema, p/w 1 episode of syncope with R arm jerking.     #Syncope-Aortic Stenosis  - Episode after exertion  - Known Aortic Stenosis likely Severe in setting of decreased LVEF  - Structural Heart evaluation in progress    #Chronic Systolic HF (EF 30%), mod to severe AS, HTN, LBBB, Lymphedema   - Has known systolic HF and AS.    - Repeat TTE showed EF 30%, mild-mod LVH, mildly reduced RVF, mod-severe AS (.61 cmsq), mod pHTN  - Had cath 2022-Non obstructive CAD  - On home Torsemide 20 mg daily, Eplerenone 25 mg daily, and Toprol  mg daily  - Compliant with all meds including Torsemide, though, dose was reduced to Furosemide  40mg once daily.   - proBNP 51K from 80k.   - Consideration for TAVR  - Was on bumex gtt but then overnight developed a fever to 102F and became hypotensive overnight. Bumex gtt stopped, given 250cc bolus. BPs stable this morning but on the lower end.   - Would hold GDMT and Lasix for the time being. She remains on her home O2 regimen and is currently asymptomatic.   - Would place on abx as per primary/ID      # Acute kidney injury superimposed on CKD.   - creatinine trend stable and favorable  - cont to monitor      #LBBB  Known LBBB  Noted intermittent Wenkebach on telemetry with bradycardia and 2:1 AV conduction  - EP has been consulted. For possible CRT-D  - BB on hold    Will continue to follow closely.

## 2024-04-16 NOTE — PROVIDER CONTACT NOTE (OTHER) - ASSESSMENT
Patient with oral temp-100F with chills and productive cough ,spitting up phlegm.Rectal temp-102.7,skin appears flushed.Alert,oriented x4.See vitals.

## 2024-04-16 NOTE — PROVIDER CONTACT NOTE (CHANGE IN STATUS NOTIFICATION) - ACTION/TREATMENT ORDERED:
LR Bolus 250ml x1,Blood cultures x2, labsas ordered.Cefepime 2gm x1and Vanco 1 gm as ordered.See RRT sheet for details.

## 2024-04-16 NOTE — CONSULT NOTE ADULT - SUBJECTIVE AND OBJECTIVE BOX
HPI:  73F w HFrEF (EF 30%), mod AS, known LBBB, HTN, IDDM1, CKD, hypothyroidism, chronic lymphedema, suspected OHS/LELIA on 3L NC home O2 p/w 1 episode of syncope. Recent admission at [facility] (3/29-4/5) for ADHF and DUNCAN s/p diuresis, discharged with new home O2 3L NC suspecting OHS/LELIA pending outpatient w/u. Since discharge a week ago, she has been staying at home with   Pt had sob walking to car. Once in car, she was still breathing heavily. Partner noticed pt was not responding to verbal stimuli for 10-15sec and witnessed R arm jerking. Pt gained consciousness quickly without postictal symptoms. Pt went to Cardiologist Dr. Nathan who recommended pt to come to ED. No fever, cp, abd pain, n/v. Leg swelling is improved from prior. Pt on torsemide 40mg which she has been taking. Pt was discharged on 3LNC which she is currently on. Patient reports she did not take Farxiga that she was discharged on as she was concerned about side effects.     In ED, patient was found afebrile, hemodynamically stable, breathing well on 3L NC. Initial labs notable for mild hyponatremia, DUNCAN on CKD, elevated proBNP and elevated pCO2 both improved from prior. (12 Apr 2024 16:31)    Derm consulted for psoriasis management and eval of legs. Pt says she had this for along time and saw an outside dermatologist but the cream she was given did not work so she did not go back to see him.    PAST MEDICAL & SURGICAL HISTORY:  Hypertension      Diabetes      Lymphedema      H/O left bundle branch block      History of left bundle branch block (LBBB)      Systolic heart failure, chronic      Type 1 diabetes      H/O cataract  2020      History of surgical removal of meniscus of knee  left in 1971      Frozen shoulder  2000      H/O Achilles tendon repair  lengthened bilaterally, 2000      Fractured skull  1968          REVIEW OF SYSTEMS:  General: no fevers/chills; no lethargy  Skin/Breast: see HPI  Ophthalmologic: no eye pain or changes in vision  ENT: no dysphagia or changes in hearing  Respiratory: no SOB or cough  Cardiovascular: no palpitations or chest pain  Gastrointestinal: no abdominal pain or blood in stool  Genitourinary: no dysuria or frequency  Musculoskeletal: no joint pains or weakness  Neurological: no weakness, numbness, or tingling    MEDICATIONS  (STANDING):  ascorbic acid 500 milliGRAM(s) Oral daily  aspirin  chewable 81 milliGRAM(s) Oral daily  atorvastatin 80 milliGRAM(s) Oral at bedtime  buMETAnide Infusion 0.5 mG/Hr (2.5 mL/Hr) IV Continuous <Continuous>  calamine/zinc oxide Lotion 1 Application(s) Topical three times a day  dextrose 10% Bolus 125 milliLiter(s) IV Bolus once  dextrose 5%. 1000 milliLiter(s) (100 mL/Hr) IV Continuous <Continuous>  dextrose 5%. 1000 milliLiter(s) (50 mL/Hr) IV Continuous <Continuous>  dextrose 50% Injectable 12.5 Gram(s) IV Push once  dextrose 50% Injectable 25 Gram(s) IV Push once  ferrous    sulfate 325 milliGRAM(s) Oral two times a day  glucagon  Injectable 1 milliGRAM(s) IntraMuscular once  heparin   Injectable 5000 Unit(s) SubCutaneous every 8 hours  insulin glargine Injectable (LANTUS) 25 Unit(s) SubCutaneous at bedtime  insulin lispro (ADMELOG) corrective regimen sliding scale   SubCutaneous at bedtime  insulin lispro (ADMELOG) corrective regimen sliding scale   SubCutaneous three times a day before meals  levothyroxine 75 MICROGram(s) Oral daily  multivitamin 1 Tablet(s) Oral daily  nystatin Powder 1 Application(s) Topical two times a day  pantoprazole    Tablet 40 milliGRAM(s) Oral before breakfast    MEDICATIONS  (PRN):  dextrose Oral Gel 15 Gram(s) Oral once PRN Blood Glucose LESS THAN 70 milliGRAM(s)/deciliter  diphenhydrAMINE 25 milliGRAM(s) Oral every 4 hours PRN Rash and/or Itching  ondansetron Injectable 4 milliGRAM(s) IV Push every 6 hours PRN Nausea and/or Vomiting      Allergies    Ativan (Rash; Urticaria)  penicillins (Hives)    Intolerances        SOCIAL HISTORY:     hx smoking  non-smoker    FAMILY HISTORY:  Family history of CVA  mom, age 57        Vital Signs Last 24 Hrs  T(C): 37.1 (16 Apr 2024 11:52), Max: 39.3 (15 Apr 2024 22:46)  T(F): 98.7 (16 Apr 2024 11:52), Max: 102.7 (15 Apr 2024 22:46)  HR: 78 (16 Apr 2024 11:52) (78 - 108)  BP: 92/52 (16 Apr 2024 11:52) (90/52 - 135/69)  BP(mean): --  RR: 18 (16 Apr 2024 11:52) (18 - 20)  SpO2: 97% (16 Apr 2024 11:52) (97% - 98%)    Parameters below as of 16 Apr 2024 11:52  Patient On (Oxygen Delivery Method): nasal cannula  O2 Flow (L/min): 3      PHYSICAL EXAM:  The patient was in NAD.  OP showed no ulcerations.  There was no visible LAD.  Conjunctiva were non-injected.  The scalp, hair, face, eyebrows, lips, OP, neck, chest, back, extremities x4, and nails were examined.  There was no hyperhidrosis or bromhidrosis.     Of note on skin exam: pink plaques with micaceous scale on trunk and extremities  Legs with erythematous circumferential patches and edema    LABS:                        12.6   14.94 )-----------( 220      ( 16 Apr 2024 09:50 )             42.6     04-16    135  |  94<L>  |  66<H>  ----------------------------<  54<LL>  5.1   |  26  |  2.81<H>    Ca    8.4      16 Apr 2024 06:47  Phos  3.5     04-16  Mg     2.1     04-16    TPro  6.5  /  Alb  3.1<L>  /  TBili  1.0  /  DBili  x   /  AST  28  /  ALT  8<L>  /  AlkPhos  45  04-16      Urinalysis Basic - ( 16 Apr 2024 06:47 )    Color: x / Appearance: x / SG: x / pH: x  Gluc: 54 mg/dL / Ketone: x  / Bili: x / Urobili: x   Blood: x / Protein: x / Nitrite: x   Leuk Esterase: x / RBC: x / WBC x   Sq Epi: x / Non Sq Epi: x / Bacteria: x        RADIOLOGY & ADDITIONAL STUDIES: reviewed; no pertinent findings HPI:  73F w HFrEF (EF 30%), mod AS, known LBBB, HTN, IDDM1, CKD, hypothyroidism, chronic lymphedema, suspected OHS/LELIA on 3L NC home O2 p/w 1 episode of syncope. Recent admission at [facility] (3/29-4/5) for ADHF and DUNCAN s/p diuresis, discharged with new home O2 3L NC suspecting OHS/LELIA pending outpatient w/u. Since discharge a week ago, she has been staying at home with   Pt had sob walking to car. Once in car, she was still breathing heavily. Partner noticed pt was not responding to verbal stimuli for 10-15sec and witnessed R arm jerking. Pt gained consciousness quickly without postictal symptoms. Pt went to Cardiologist Dr. Nathan who recommended pt to come to ED. No fever, cp, abd pain, n/v. Leg swelling is improved from prior. Pt on torsemide 40mg which she has been taking. Pt was discharged on 3LNC which she is currently on. Patient reports she did not take Farxiga that she was discharged on as she was concerned about side effects.     In ED, patient was found afebrile, hemodynamically stable, breathing well on 3L NC. Initial labs notable for mild hyponatremia, DUNCAN on CKD, elevated proBNP and elevated pCO2 both improved from prior. (12 Apr 2024 16:31)    Derm consulted for psoriasis management and eval of legs. Pt says she had this for along time and saw an outside dermatologist but the cream she was given did not work so she did not go back to see him. of note, RRT overnight with fever and vomiting    PAST MEDICAL & SURGICAL HISTORY:  Hypertension      Diabetes      Lymphedema      H/O left bundle branch block      History of left bundle branch block (LBBB)      Systolic heart failure, chronic      Type 1 diabetes      H/O cataract  2020      History of surgical removal of meniscus of knee  left in 1971      Frozen shoulder  2000      H/O Achilles tendon repair  lengthened bilaterally, 2000      Fractured skull  1968          REVIEW OF SYSTEMS:  General: has fevers  Skin/Breast: see HPI  Ophthalmologic: no eye pain or changes in vision  ENT: no dysphagia or changes in hearing  Respiratory: no SOB or cough  Cardiovascular: no palpitations or chest pain  Gastrointestinal: vomiting  Genitourinary: no dysuria or frequency  Musculoskeletal: no joint pains or weakness  Neurological: no weakness, numbness, or tingling    MEDICATIONS  (STANDING):  ascorbic acid 500 milliGRAM(s) Oral daily  aspirin  chewable 81 milliGRAM(s) Oral daily  atorvastatin 80 milliGRAM(s) Oral at bedtime  buMETAnide Infusion 0.5 mG/Hr (2.5 mL/Hr) IV Continuous <Continuous>  calamine/zinc oxide Lotion 1 Application(s) Topical three times a day  dextrose 10% Bolus 125 milliLiter(s) IV Bolus once  dextrose 5%. 1000 milliLiter(s) (100 mL/Hr) IV Continuous <Continuous>  dextrose 5%. 1000 milliLiter(s) (50 mL/Hr) IV Continuous <Continuous>  dextrose 50% Injectable 12.5 Gram(s) IV Push once  dextrose 50% Injectable 25 Gram(s) IV Push once  ferrous    sulfate 325 milliGRAM(s) Oral two times a day  glucagon  Injectable 1 milliGRAM(s) IntraMuscular once  heparin   Injectable 5000 Unit(s) SubCutaneous every 8 hours  insulin glargine Injectable (LANTUS) 25 Unit(s) SubCutaneous at bedtime  insulin lispro (ADMELOG) corrective regimen sliding scale   SubCutaneous at bedtime  insulin lispro (ADMELOG) corrective regimen sliding scale   SubCutaneous three times a day before meals  levothyroxine 75 MICROGram(s) Oral daily  multivitamin 1 Tablet(s) Oral daily  nystatin Powder 1 Application(s) Topical two times a day  pantoprazole    Tablet 40 milliGRAM(s) Oral before breakfast    MEDICATIONS  (PRN):  dextrose Oral Gel 15 Gram(s) Oral once PRN Blood Glucose LESS THAN 70 milliGRAM(s)/deciliter  diphenhydrAMINE 25 milliGRAM(s) Oral every 4 hours PRN Rash and/or Itching  ondansetron Injectable 4 milliGRAM(s) IV Push every 6 hours PRN Nausea and/or Vomiting      Allergies    Ativan (Rash; Urticaria)  penicillins (Hives)    Intolerances        SOCIAL HISTORY:     hx smoking  non-smoker    FAMILY HISTORY:  Family history of CVA  mom, age 57        Vital Signs Last 24 Hrs  T(C): 37.1 (16 Apr 2024 11:52), Max: 39.3 (15 Apr 2024 22:46)  T(F): 98.7 (16 Apr 2024 11:52), Max: 102.7 (15 Apr 2024 22:46)  HR: 78 (16 Apr 2024 11:52) (78 - 108)  BP: 92/52 (16 Apr 2024 11:52) (90/52 - 135/69)  BP(mean): --  RR: 18 (16 Apr 2024 11:52) (18 - 20)  SpO2: 97% (16 Apr 2024 11:52) (97% - 98%)    Parameters below as of 16 Apr 2024 11:52  Patient On (Oxygen Delivery Method): nasal cannula  O2 Flow (L/min): 3      PHYSICAL EXAM:  The patient was in NAD.  OP showed no ulcerations.  There was no visible LAD.  Conjunctiva were non-injected.  The scalp, hair, face, eyebrows, lips, OP, neck, chest, back, extremities x4, and nails were examined.  There was no hyperhidrosis or bromhidrosis.     Of note on skin exam: pink plaques with micaceous scale on trunk and extremities  Legs with erythematous circumferential patches and edema  New erythematous patches on face, neck, intertriginous areas, inferior R breast with linear pustules   white plaques under tongue    LABS:                        12.6   14.94 )-----------( 220      ( 16 Apr 2024 09:50 )             42.6     04-16    135  |  94<L>  |  66<H>  ----------------------------<  54<LL>  5.1   |  26  |  2.81<H>    Ca    8.4      16 Apr 2024 06:47  Phos  3.5     04-16  Mg     2.1     04-16    TPro  6.5  /  Alb  3.1<L>  /  TBili  1.0  /  DBili  x   /  AST  28  /  ALT  8<L>  /  AlkPhos  45  04-16      Urinalysis Basic - ( 16 Apr 2024 06:47 )    Color: x / Appearance: x / SG: x / pH: x  Gluc: 54 mg/dL / Ketone: x  / Bili: x / Urobili: x   Blood: x / Protein: x / Nitrite: x   Leuk Esterase: x / RBC: x / WBC x   Sq Epi: x / Non Sq Epi: x / Bacteria: x        RADIOLOGY & ADDITIONAL STUDIES: reviewed; no pertinent findings

## 2024-04-16 NOTE — PROGRESS NOTE ADULT - PROBLEM SELECTOR PLAN 4
TTE (4/2024) LVEF 30% w mod LVH, mildly reduced RV, AS (0.61 cm2), mod pHTN.   On home Torsemide 40 mg daily, Toprol  mg daily, Farxiga 5mg daily. Follows w Dr. Cherise rosas eventual plan to start further GDMT as renally tolerated  proBNP 51K from 80k.     - strict I/Os, daily weights, fluid restrict 1.5L/day  - dc'd home torsemide 40mg daily  - Started bumex gtt 0.5mg/hr, held iso sepsis

## 2024-04-16 NOTE — PROGRESS NOTE ADULT - SUBJECTIVE AND OBJECTIVE BOX
24H hour events: Pt c/o feeling worn out this morning. Developed fever to 102 last night, hypotensive, vomiting. Infectious workup started    MEDICATIONS:  aspirin  chewable 81 milliGRAM(s) Oral daily  buMETAnide Infusion 0.5 mG/Hr IV Continuous <Continuous>  heparin   Injectable 5000 Unit(s) SubCutaneous every 8 hours        diphenhydrAMINE 25 milliGRAM(s) Oral every 4 hours PRN  ondansetron Injectable 4 milliGRAM(s) IV Push every 6 hours PRN    pantoprazole    Tablet 40 milliGRAM(s) Oral before breakfast    atorvastatin 80 milliGRAM(s) Oral at bedtime  dextrose 50% Injectable 12.5 Gram(s) IV Push once  dextrose 50% Injectable 25 Gram(s) IV Push once  dextrose Oral Gel 15 Gram(s) Oral once PRN  glucagon  Injectable 1 milliGRAM(s) IntraMuscular once  insulin glargine Injectable (LANTUS) 25 Unit(s) SubCutaneous at bedtime  insulin lispro (ADMELOG) corrective regimen sliding scale   SubCutaneous at bedtime  insulin lispro (ADMELOG) corrective regimen sliding scale   SubCutaneous three times a day before meals  levothyroxine 75 MICROGram(s) Oral daily    ascorbic acid 500 milliGRAM(s) Oral daily  calamine/zinc oxide Lotion 1 Application(s) Topical three times a day  dextrose 10% Bolus 125 milliLiter(s) IV Bolus once  dextrose 5%. 1000 milliLiter(s) IV Continuous <Continuous>  dextrose 5%. 1000 milliLiter(s) IV Continuous <Continuous>  ferrous    sulfate 325 milliGRAM(s) Oral two times a day  multivitamin 1 Tablet(s) Oral daily  nystatin Powder 1 Application(s) Topical two times a day      REVIEW OF SYSTEMS:  See HPI, otherwise ROS negative.    PHYSICAL EXAM:  T(C): 37.4 (04-16-24 @ 06:03), Max: 39.3 (04-15-24 @ 22:46)  HR: 80 (04-16-24 @ 06:03) (62 - 108)  BP: 90/52 (04-16-24 @ 06:03) (90/52 - 135/69)  RR: 20 (04-16-24 @ 06:03) (18 - 20)  SpO2: 97% (04-16-24 @ 06:03) (97% - 99%)  Wt(kg): --  I&O's Summary    15 Apr 2024 07:01  -  16 Apr 2024 07:00  --------------------------------------------------------  IN: 240 mL / OUT: 1750 mL / NET: -1510 mL        Appearance: Alert. NAD	  HEENT:   NC/AT	  Cardiovascular: +S1S2 RRR, +mumur  Respiratory: CTA B/L	  Psychiatry: A & O x 3, Mood & affect appropriate  Gastrointestinal:  Soft	  Skin: significant rash b/l LE and lower back. Chronic venous stasis changes noted to b/l LE  Neurologic: Non-focal  Extremities: 3+edema BLE, +edema upper extremities as well      LABS:	 	    CBC Full  -  ( 16 Apr 2024 09:50 )  WBC Count : 14.94 K/uL  Hemoglobin : 12.6 g/dL  Hematocrit : 42.6 %  Platelet Count - Automated : 220 K/uL  Mean Cell Volume : 90.6 fl  Mean Cell Hemoglobin : 26.8 pg  Mean Cell Hemoglobin Concentration : 29.6 gm/dL  Auto Neutrophil # : 13.64 K/uL  Auto Lymphocyte # : 0.08 K/uL  Auto Monocyte # : 0.51 K/uL  Auto Eosinophil # : 0.56 K/uL  Auto Basophil # : 0.05 K/uL  Auto Neutrophil % : 91.4 %  Auto Lymphocyte % : 0.5 %  Auto Monocyte % : 3.4 %  Auto Eosinophil % : 3.7 %  Auto Basophil % : 0.3 %    04-16    135  |  94<L>  |  66<H>  ----------------------------<  54<LL>  5.1   |  26  |  2.81<H>  04-15    135  |  95<L>  |  65<H>  ----------------------------<  111<H>  4.3   |  20<L>  |  2.63<H>    Ca    8.4      16 Apr 2024 06:47  Ca    8.9      15 Apr 2024 23:15  Phos  3.5     04-16  Phos  4.1     04-15  Mg     2.1     04-16  Mg     2.4     04-15    TPro  6.5  /  Alb  3.1<L>  /  TBili  1.0  /  DBili  x   /  AST  28  /  ALT  8<L>  /  AlkPhos  45  04-16  TPro  7.7  /  Alb  3.6  /  TBili  0.9  /  DBili  x   /  AST  23  /  ALT  9<L>  /  AlkPhos  71  04-15      TELEMETRY: NSR 60-90s, non conducted PACs yesterday PM  	    	  ASSESSMENT/PLAN:

## 2024-04-16 NOTE — RAPID RESPONSE TEAM SUMMARY - NSSITUATIONBACKGROUNDRRT_GEN_ALL_CORE
72 y/o F with PMH of HFrEF (LVEF 30%), moderate AS, HTN, DM1.5, CKD3, hypothyroidism, chronic lymphadema, presents with episode of syncope. Notably, patient was recently discharged from [place] about a week prior to admission. On day of admission, patient was with episode of shortness of breath, then sat down in car had witnessed syncopal episode which lasted 10-15 seconds, no postictal state or incontinence.    RRT called for hypotension. Primary RN at bedside, initial vital signs were BP 74/32, T 99.3 oral (102 F rectal), HR 49, RR 18, SpO2 92% on 3L O2 NC, . On exam, pt mentating at baseline, lungs with some crackles, b/l LE 3-4+ pitting edema. Bumex gtt @ 0.5/hr paused. LR 1L bag hung, 250cc bolused. BCx x 2 ordered, UA/UCx, CBC, CMP, VBG w/ lactate. CTA Chest 4/15 with b/l pleural effusions, does not appear to have consolidations. Pt already received tylenol, ordered cefepime 2g q8 and vanc 1g x 1. Unclear source as pt denies dysuria, making urine, no abd pain on exam or reported by pt. Flu/COVID ordered by primary team. B/l LE wounds/lymphedema may be possible source?, although no obvious pus or abscess. Derm consult in AM, f/u wound care recs, MRSA swab ordered. Will keep on empiric abx for now and f/u cx. BP improved to 90/42 -> 97/44 -> 101/44 and  -> 80 -> 78.

## 2024-04-16 NOTE — PROGRESS NOTE ADULT - SUBJECTIVE AND OBJECTIVE BOX
[facility] - Howard Kimble, Cherise, Carlos, Herman Alston  Office Number:  [phone]    Patient resting comfortably in bed in NAD.  Laying flat with no respiratory distress. No chest pain or SOB. Denies orthopnea.   With vomiting this morning  Febrile last night to 102F with hypotension. Bumex gtt stopped, given 250cc bolus.     ROS: negative unless otherwise mentioned.    Telemetry:  SR first degree, 2nd degree heart block type I    MEDICATIONS  (STANDING):  ascorbic acid 500 milliGRAM(s) Oral daily  aspirin  chewable 81 milliGRAM(s) Oral daily  atorvastatin 80 milliGRAM(s) Oral at bedtime  buMETAnide Infusion 0.5 mG/Hr (2.5 mL/Hr) IV Continuous <Continuous>  dextrose 10% Bolus 125 milliLiter(s) IV Bolus once  dextrose 5%. 1000 milliLiter(s) (100 mL/Hr) IV Continuous <Continuous>  dextrose 5%. 1000 milliLiter(s) (50 mL/Hr) IV Continuous <Continuous>  dextrose 50% Injectable 12.5 Gram(s) IV Push once  dextrose 50% Injectable 25 Gram(s) IV Push once  ferrous    sulfate 325 milliGRAM(s) Oral two times a day  glucagon  Injectable 1 milliGRAM(s) IntraMuscular once  heparin   Injectable 5000 Unit(s) SubCutaneous every 8 hours  insulin glargine Injectable (LANTUS) 25 Unit(s) SubCutaneous at bedtime  insulin lispro (ADMELOG) corrective regimen sliding scale   SubCutaneous at bedtime  insulin lispro (ADMELOG) corrective regimen sliding scale   SubCutaneous three times a day before meals  levothyroxine 75 MICROGram(s) Oral daily  multivitamin 1 Tablet(s) Oral daily  nystatin Powder 1 Application(s) Topical two times a day  pantoprazole    Tablet 40 milliGRAM(s) Oral before breakfast    MEDICATIONS  (PRN):  dextrose Oral Gel 15 Gram(s) Oral once PRN Blood Glucose LESS THAN 70 milliGRAM(s)/deciliter      Allergies    Ativan (Rash; Urticaria)  penicillins (Hives)    Intolerances        Vital Signs Last 24 Hrs  T(C): 37.4 (16 Apr 2024 06:03), Max: 39.3 (15 Apr 2024 22:46)  T(F): 99.4 (16 Apr 2024 06:03), Max: 102.7 (15 Apr 2024 22:46)  HR: 80 (16 Apr 2024 06:03) (62 - 108)  BP: 90/52 (16 Apr 2024 06:03) (90/52 - 135/69)  BP(mean): --  RR: 20 (16 Apr 2024 06:03) (18 - 20)  SpO2: 97% (16 Apr 2024 06:03) (97% - 99%)    Parameters below as of 16 Apr 2024 06:03  Patient On (Oxygen Delivery Method): nasal cannula  O2 Flow (L/min): 3      I&O's Summary    15 Apr 2024 07:01  -  16 Apr 2024 07:00  --------------------------------------------------------  IN: 240 mL / OUT: 1750 mL / NET: -1510 mL        ON EXAM:    General: NAD, awake and alert, oriented x 3, obese  HEENT: Mucous membranes are moist, anicteric  Lungs: Non-labored, breath sounds are decreased bilaterally, No wheezing, rales or rhonchi  Cardiovascular: Regular, S1 and S2, +SM at ru/lusb  Gastrointestinal: Bowel Sounds present, soft, nontender.   Lymph: chronic moderate peripheral edema with venous changes and rash. No lymphadenopathy.  Skin: rash on legs  Psych:  Mood & affect appropriate    LABS: All Labs Reviewed:                        11.9   8.51  )-----------( 203      ( 16 Apr 2024 00:32 )             38.5                         10.3   5.22  )-----------( 183      ( 15 Apr 2024 06:52 )             34.6                         10.0   5.03  )-----------( 186      ( 14 Apr 2024 07:18 )             33.3     16 Apr 2024 06:47    135    |  94     |  66     ----------------------------<  54     5.1     |  26     |  2.81   15 Apr 2024 23:15    135    |  95     |  65     ----------------------------<  111    4.3     |  20     |  2.63   15 Apr 2024 06:52    137    |  96     |  66     ----------------------------<  179    4.3     |  29     |  2.60     Ca    8.4        16 Apr 2024 06:47  Ca    8.9        15 Apr 2024 23:15  Ca    8.6        15 Apr 2024 06:52  Phos  3.5       16 Apr 2024 06:47  Phos  4.1       15 Apr 2024 06:52  Phos  4.3       14 Apr 2024 07:17  Mg     2.1       16 Apr 2024 06:47  Mg     2.4       15 Apr 2024 06:52  Mg     2.5       14 Apr 2024 07:17    TPro  6.5    /  Alb  3.1    /  TBili  1.0    /  DBili  x      /  AST  28     /  ALT  8      /  AlkPhos  45     16 Apr 2024 06:47  TPro  7.7    /  Alb  3.6    /  TBili  0.9    /  DBili  x      /  AST  23     /  ALT  9      /  AlkPhos  71     15 Apr 2024 23:15  TPro  6.9    /  Alb  3.3    /  TBili  0.6    /  DBili  x      /  AST  15     /  ALT  8      /  AlkPhos  63     15 Apr 2024 06:52          Blood Culture:     04-14 @ 07:18  TSH: 5.06      TTE 4/1/24:  CONCLUSIONS:      1. Technically difficult image quality.   2. Left ventricular systolic function is moderately to severely decreased with an ejection fraction of 30 % by Nunes's method of disks.   3. Mildto moderate left ventricular hypertrophy.   4. The right ventricle is not well visualized. Based on visual assessment, the right ventricle appears mildly enlarged. mildly reduced systolic function.   5. The left atrium is mildly dilated.   6. Trace aortic regurgitation.   7. Mild mitral regurgitation.   8. Moderate to severe aortic stenosis. There is a Vmax of 3.66m/s, Mean gradient of 26mmHg and a DI of 0.19. The JUAN DANIEL is calculated as 0.61cm2 by continuity. This likely represents low flow low gradient AS.   9. Estimated pulmonary artery systolic pressure is 59 mmHg, consistent with moderate pulmonary hypertension.  10. The inferior vena cava is dilated measuring 2.60 cm in diameter, (dilated >2.1cm) with normal inspiratory collapse (normal >50%) consistent with mildly elevated right atrial pressure (~8, range 5-10mmHg).

## 2024-04-16 NOTE — PROGRESS NOTE ADULT - ASSESSMENT
73F w HFrEF (EF 30%), mod AS, known LBBB, HTN, IDDM1, CKD, hypothyroidism, chronic lymphedema, p/w 1 episode of syncope with R arm jerking, likely 2/2 severe symptomatic AS. CTA imagings performed for TAVR eval, c/b DUNCAN on CKD. C/b febrile and hypotension, sepsis workup pending.

## 2024-04-16 NOTE — PROGRESS NOTE ADULT - SUBJECTIVE AND OBJECTIVE BOX
[facility]   [phone]  RENAL FOLLOW UP NOTE  --------------------------------------------------------------------------------  Chief Complaint:    24 hour events/subjective:    patient seen and examined  densaulo sob  c/o nausea this am  rapid response overnight for fever/hypotension    PAST HISTORY  --------------------------------------------------------------------------------  No significant changes to PMH, PSH, FHx, SHx, unless otherwise noted    ALLERGIES & MEDICATIONS  --------------------------------------------------------------------------------  Allergies    Ativan (Rash; Urticaria)  penicillins (Hives)    Intolerances      Standing Inpatient Medications  ascorbic acid 500 milliGRAM(s) Oral daily  aspirin  chewable 81 milliGRAM(s) Oral daily  atorvastatin 80 milliGRAM(s) Oral at bedtime  buMETAnide Infusion 0.5 mG/Hr IV Continuous <Continuous>  calamine/zinc oxide Lotion 1 Application(s) Topical three times a day  dextrose 10% Bolus 125 milliLiter(s) IV Bolus once  dextrose 5%. 1000 milliLiter(s) IV Continuous <Continuous>  dextrose 5%. 1000 milliLiter(s) IV Continuous <Continuous>  dextrose 50% Injectable 12.5 Gram(s) IV Push once  dextrose 50% Injectable 25 Gram(s) IV Push once  ferrous    sulfate 325 milliGRAM(s) Oral two times a day  glucagon  Injectable 1 milliGRAM(s) IntraMuscular once  heparin   Injectable 5000 Unit(s) SubCutaneous every 8 hours  insulin glargine Injectable (LANTUS) 25 Unit(s) SubCutaneous at bedtime  insulin lispro (ADMELOG) corrective regimen sliding scale   SubCutaneous at bedtime  insulin lispro (ADMELOG) corrective regimen sliding scale   SubCutaneous three times a day before meals  levothyroxine 75 MICROGram(s) Oral daily  multivitamin 1 Tablet(s) Oral daily  nystatin Powder 1 Application(s) Topical two times a day  pantoprazole    Tablet 40 milliGRAM(s) Oral before breakfast    PRN Inpatient Medications  dextrose Oral Gel 15 Gram(s) Oral once PRN  diphenhydrAMINE 25 milliGRAM(s) Oral every 4 hours PRN  ondansetron Injectable 4 milliGRAM(s) IV Push every 6 hours PRN      REVIEW OF SYSTEMS  --------------------------------------------------------------------------------    Gen: + fevers/chills,  CVS: denies chest pain/palpitations  Resp: denies SOB/Cough  GI: Denies +N/-V/-Abd pain  : Denies dysuria    VITALS/PHYSICAL EXAM  --------------------------------------------------------------------------------  T(C): 37.1 (04-16-24 @ 11:52), Max: 39.3 (04-15-24 @ 22:46)  HR: 78 (04-16-24 @ 11:52) (78 - 108)  BP: 92/52 (04-16-24 @ 11:52) (90/52 - 135/69)  RR: 18 (04-16-24 @ 11:52) (18 - 20)  SpO2: 97% (04-16-24 @ 11:52) (97% - 98%)  Wt(kg): --        04-15-24 @ 07:01  -  04-16-24 @ 07:00  --------------------------------------------------------  IN: 240 mL / OUT: 1750 mL / NET: -1510 mL      Physical Exam:  	  	Gen: ill appearing, on O2, facial erythema, neck and arm erythema,   	Pulm: decrease bs  no rales or ronchi or wheezing  	CV: No JVD. RRR, S1S2; no rub II/VI M   	Abd: +BS, soft, nontender/nondistended, obese   	: No suprapubic tenderness  	UE: Warm, no cyanosis  no clubbing,  no edema  	LE: Warm, no cyanosis  no clubbing, 4+ edema, B/L LE raised red plaque   	Neuro: alert and oriented. speech coherent     LABS/STUDIES  --------------------------------------------------------------------------------              12.6   14.94 >-----------<  220      [04-16-24 @ 09:50]              42.6     135  |  94  |  66  ----------------------------<  54      [04-16-24 @ 06:47]  5.1   |  26  |  2.81        Ca     8.4     [04-16-24 @ 06:47]      Mg     2.1     [04-16-24 @ 06:47]      Phos  3.5     [04-16-24 @ 06:47]    TPro  6.5  /  Alb  3.1  /  TBili  1.0  /  DBili  x   /  AST  28  /  ALT  8   /  AlkPhos  45  [04-16-24 @ 06:47]          Creatinine Trend:  SCr 2.81 [04-16 @ 06:47]  SCr 2.63 [04-15 @ 23:15]  SCr 2.60 [04-15 @ 06:52]  SCr 2.69 [04-14 @ 19:53]  SCr 2.72 [04-14 @ 07:17]              Urine Creatinine 34      [04-12-24 @ 16:51]  Urine Protein 8      [04-12-24 @ 16:51]  Urine Sodium 27      [04-12-24 @ 16:51]  Urine Urea Nitrogen 243      [04-12-24 @ 18:07]  Urine Potassium 29      [04-12-24 @ 16:51]  Urine Osmolality 207      [04-12-24 @ 16:51]    TSH 5.06      [04-14-24 @ 07:18]  Lipid: chol 74, TG 70, HDL 33, LDL --      [04-13-24 @ 07:05]      < from: CT Angio Abdomen and Pelvis TAVR DENIA w/wo IV Cont (04.15.24 @ 15:30) >  ACC: [id number] EXAM:  CT ANGIO CHEST TAVR DENIA WAWIC   ORDERED BY: JUSTYNA GAGNON     ACC: [id number] EXAM:  CT ANGIO AB PEL TAVR DENIA WAWIC   ORDERED BY: JUSTYNA GAGNON     PROCEDURE DATE:  04/15/2024          INTERPRETATION:  CTA CHEST, ABDOMEN AND PELVIS    INDICATION: History of aortic stenosis. Possible transcatheter aortic   valve replacement. The study is being performed for planning.    TECHNIQUE: Enhanced helical images were obtained of the chest, abdomen   and pelvis. Coronal and sagittal images were reconstructed.  Images were   obtained after the uneventful administration of 90 cc of nonionic   intravenous contrast (Omnipaque 350). 10 cc of nonionic intravenous   contrast (Omnipaque 350) was discarded. Maximum intensity projection   images were generated.    COMPARISON: Chest radiograph 4/12/2024.    FINDINGS:    CHEST:    Aorta: Left-sided aortic arch and left-sided descending thoracic aorta.   The visualized segments of the great vessels are unremarkable.    Pulmonary artery:The exam was not tailored for pulmonary embolus.    Tubes/Lines: None.    Lungs, airways and pleura: Bilateral pleural effusions and lower lobe   passive atelectasis. The airways are unremarkable. No pneumothorax.    Mediastinum: The thyroid gland isunremarkable. The upper paratracheal,   lower paratracheal, prevascular, subcarinal and paraesophageal lymph   nodes measure less than 15 mm in the short axis.    No pericardial effusion. Aortic valve calcifications. The heart were   discussed with the CT of the heart. The right SVC is narrowed at the   level of the thoracic inlet. Multiple right chest wall collateral vessels   opacifying the SVC and part by the right internal mammary vein.    Bones And Soft Tissues: The bones are unremarkable.  The soft tissues are   unremarkable.      ABDOMEN AND PELVIS:    Liver, gallbladder and biliary system: The liver is normal. There is no   intrahepatic or extrahepatic biliary ductal dilatation. The gallbladder   is normal.    Pancreas: The pancreas is unremarkable.    Spleen: The spleen is normal.    Adrenal glands and kidneys: The right adrenal gland is normal. The left   adrenal gland is thickened. There is a 1.2 x 0.8 cm left adrenal nodule.    The right kidney is normal.    The left kidney isnormal.    No hydronephrosis or hydroureter of either kidney.    Lymph nodes: There are no enlarged retroperitoneal or upper abdominal   lymph nodes.    Bowel: The stomach is incompletely distended. The small bowel is normal   in caliber. The large bowel is normal in caliber. No free air. No free   fluid. The mesentery is unremarkable. Umbilical hernia containing fat.   The appendix is not visualized.    Vascular: The aorta is normal in caliber. Aortic calcifications. The   major branches of the aorta are proximally patent.    Bladder and reproductive system: The bladder is unremarkable. The uterus   and adnexa are unremarkable.    Bones And Soft Tissues: The bones are unremarkable.  Body wall anasarca.      IMPRESSION:    CHEST:    1.  Bilateral pleural effusions.  2.  Bilateral lower lobe passive atelectasis.  3.  Narrowing of the right subclavian vein at the thoracic inlet.      ABDOMEN AND PELVIS:    1.  No bowel obstruction.    --- End of Report ---    < end of copied text >

## 2024-04-16 NOTE — PROGRESS NOTE ADULT - SUBJECTIVE AND OBJECTIVE BOX
*****Structural Heart Team*****    Subjective:    Patient resting in bed stating she is not feeling well. She was up most of the night vomiting, and she was febrile and hypotensive overnight.      PAST MEDICAL & SURGICAL HISTORY:  Hypertension    Diabetes    Lymphedema    H/O left bundle branch block    History of left bundle branch block (LBBB)    Systolic heart failure, chronic    Type 1 diabetes    H/O cataract  2020    History of surgical removal of meniscus of knee  left in 1971    Frozen shoulder  2000    H/O Achilles tendon repair  lengthened bilaterally, 2000    Fractured skull  1968          T(C): 37.1 (04-16-24 @ 11:52), Max: 39.3 (04-15-24 @ 22:46)  HR: 78 (04-16-24 @ 11:52) (78 - 108)  BP: 92/52 (04-16-24 @ 11:52) (90/52 - 135/69)  RR: 18 (04-16-24 @ 11:52) (18 - 20)  SpO2: 97% (04-16-24 @ 11:52) (97% - 98%)  Wt(kg): --  04-15 @ 07:01  -  04-16 @ 07:00  --------------------------------------------------------  IN: 240 mL / OUT: 1750 mL / NET: -1510 mL      MEDICATIONS  (STANDING):  ascorbic acid 500 milliGRAM(s) Oral daily  aspirin  chewable 81 milliGRAM(s) Oral daily  atorvastatin 80 milliGRAM(s) Oral at bedtime  buMETAnide Infusion 0.5 mG/Hr (2.5 mL/Hr) IV Continuous <Continuous>  calamine/zinc oxide Lotion 1 Application(s) Topical three times a day  ferrous    sulfate 325 milliGRAM(s) Oral two times a day  glucagon  Injectable 1 milliGRAM(s) IntraMuscular once  heparin   Injectable 5000 Unit(s) SubCutaneous every 8 hours  insulin glargine Injectable (LANTUS) 25 Unit(s) SubCutaneous at bedtime  insulin lispro (ADMELOG) corrective regimen sliding scale   SubCutaneous at bedtime  insulin lispro (ADMELOG) corrective regimen sliding scale   SubCutaneous three times a day before meals  levothyroxine 75 MICROGram(s) Oral daily  multivitamin 1 Tablet(s) Oral daily  nystatin Powder 1 Application(s) Topical two times a day  pantoprazole    Tablet 40 milliGRAM(s) Oral before breakfast    MEDICATIONS  (PRN):  dextrose Oral Gel 15 Gram(s) Oral once PRN Blood Glucose LESS THAN 70 milliGRAM(s)/deciliter  diphenhydrAMINE 25 milliGRAM(s) Oral every 4 hours PRN Rash and/or Itching  ondansetron Injectable 4 milliGRAM(s) IV Push every 6 hours PRN Nausea and/or Vomiting      Review of Symptoms:  Constitutional: Awake, Alert, Follows commands  Respiratory: + SOB  Cardiac: Denies CP, Denies Palpitations  Gastrointestinal: Denies Pain, Denies N/V, tolerating po intake  Vascular: Negative  Extremities: + Edema, No joint pain or swelling  Neurological: Negative  Endocrine: No heat or cold intolerance, No excessive thirst  Heme/Onc: Negative    Exam:  General: A/Ox3, DOMINIC, NAD  HEENT: Supple, No JVD, Trachea midline, no masses  Pulmonary: CTAB, = Chest Excursion, no accessory muscle use  Cor: S1S2, RRR,III/VI STEVE  ECG: SR  Gastrointestinal: Soft, NT/ND, + Bowel Sounds  Neuro: = motor and sensory B/L, No focal deficits  Vascular: + Lymphedema B/L lower extremities  Extremities: No joint pain or swelling  Skin: Warm/Dry, + Psoriatic plaques                          12.6   14.94 )-----------( 220      ( 16 Apr 2024 09:50 )             42.6   04-16    135  |  94<L>  |  66<H>  ----------------------------<  54<LL>  5.1   |  26  |  2.81<H>    Ca    8.4      16 Apr 2024 06:47  Phos  3.5     04-16  Mg     2.1     04-16    TPro  6.5  /  Alb  3.1<L>  /  TBili  1.0  /  DBili  x   /  AST  28  /  ALT  8<L>  /  AlkPhos  45  04-16      Imaging Reviewed:  Cardiac CT:  Adrenal glands and kidneys: The right adrenal gland is normal. The left   adrenal gland is thickened. There is a 1.2 x 0.8 cm left adrenal nodule.    The right kidney is normal.    The left kidney isnormal.    No hydronephrosis or hydroureter of either kidney.    Lymph nodes: There are no enlarged retroperitoneal or upper abdominal   lymph nodes.    Bowel: The stomach is incompletely distended. The small bowel is normal   in caliber. The large bowel is normal in caliber. No free air. No free   fluid. The mesentery is unremarkable. Umbilical hernia containing fat.   The appendix is not visualized.    Vascular: The aorta is normal in caliber. Aortic calcifications. The   major branches of the aorta are proximally patent.    Bladder and reproductive system: The bladder is unremarkable. The uterus   and adnexa are unremarkable.    Bones And Soft Tissues: The bones are unremarkable.  Body wall anasarca.      IMPRESSION:    CHEST:    1.  Bilateral pleural effusions.  2.  Bilateral lower lobe passive atelectasis.  3.  Narrowing of the right subclavian vein at the thoracic inlet.      ABDOMEN AND PELVIS:    1.  No bowel obstruction.    < end of copied text >    Assesment/Plan:  74 y/o female with Severe Aortic Stenosis, Psoriasis, Acute on Chronic Heart Failure, Acute on Chronic Renal Failure, New Adrenal Gland Findings.    1.) Aortic Stenosis: CT complete. Will review with SH Team and make recommendations. TAVR likely to be done as an out patient.    2.) Heart Failure: Continue with Bumetanide drip, Monitor Daily weight, Monitor I/O's. Continue with GDMT    3.) Acute on Chronic Renal Injury: Continue to monitor Creatinine which is up slightly today after contrast administration. Will discuss with Dr. Merino    4.) Adrenal Gland findings: On Cardiac CT, patient found to have a left adrenal gland nodule as well as thickening. Recommend Endocrine to evaluate and follow.    Will discuss with Medicine team. Discussed with Dr. Martell.           [place]  [id number]

## 2024-04-16 NOTE — CONSULT NOTE ADULT - ASSESSMENT
ASSESSMENT AND PLAN:  NOTE IS INCOMPLETE. PLEASE CHECK BACK LATER FOR FINAL ASSESSMENT AND RECOMMENDATIONS.      The patient's chart was reviewed in addition to being seen and examined at bedside with the dermatology attending Dr. Shelby.  Recommendations were communicated with the primary team.    Please page [phone] with a 10-digit call-back number for further related questions.  Please re-consult Dermatology for any new or worsening developments.    Zeyad Dc MD  Resident Physician, PGY-2  [facility]  Pager: [phone]  Office: [phone] ASSESSMENT AND PLAN:  #dermatitis, hx of #psoriasis  intertrigo favored, however an early AGEP or GPP can not be excluded at this point  - for the psoriasis please - START triamcinolone 0.1% ointment BID to affected areas (never to the face/groin/skin folds) for up to 2 weeks on, then 1 week off. Please dispense multiple tubes to cover body surface area.  - for the intertriginous areas please START nystatin cream BID mixed with triamcinolone 0.1% cream BID  - will closely monitor to see if rash develops into AGEP, which could explain her fevers  - please order lipase to eval for possible pancreatic involvement if AGEP    #lymphedema  - agree with wound care, compression, elevation recommended    #white plaques in mouth  - possibly oral candidiasis although presentation is atypical  - f/u fungal culture  - empirically treat, please START nystatin swish and swallow 4x/day    The patient's chart was reviewed in addition to being seen and examined at bedside with the dermatology attending Dr. Shelby.  Recommendations were communicated with the primary team.    Please page [phone] with a 10-digit call-back number for further related questions.  Please re-consult Dermatology for any new or worsening developments.    Zeyad Dc MD  Resident Physician, PGY-2  [facility]  Pager: [phone]  Office: [phone]

## 2024-04-16 NOTE — PROGRESS NOTE ADULT - ASSESSMENT
74yo woman with PMHx of HFrEF (EF 30%), mod AS, known 1st degree AVB and LBBB, HTN, IDDM1, CKD, hypothyroidism, chronic lymphedema, suspected OHS/LELIA on 3L NC home O2 p/w 1 episode of syncope. Recent admission at [facility] (3/29-4/5) for ADHF and DUNCAN s/p diuresis.   Pt states she does not remember her syncopal episode. She remembers walking to the car and getting into it. She denies any preceding symptoms. She was told that she "blacked out" and was not responding for 10-15 seconds with arm shaking. There is documentation that pt was dyspneic upon getting into car after walking but pt did not recall that on EP consultation 4/15.  Pt went to her cardiologist Dr. Nathan who recommended pt to come to ED.    EKG consistent with sinus bradycardia at 58bpm, 1st degree AVB (Sharath 318ms) with LBBB (QRSd 176). Early AM 4/14, she was noted to have 2:1 AV block. Pt also with e/o Wenckebach several times throughout the day on 4/14. She is being worked up for her aortic stenosis, likely severe in setting of decreased LVEF. She has significant LE edema and history of lymphedema with rash on lower legs. She was on Toprol XL 100mg at home but has not taken in since 4/12.    1) Syncope  2) 2:1 AV block with known 1st degree AVB (Sharath 318ms) with LBBB (QRSd 176)  3) HFrEF with LVEF 30%  4) Aortic stenosis, likely severe in setting of decreased LVEF  5) Lymphedema with significant LE edema and rash  6) Fever 4/16, undergoing infectious workup    Recommendations:  -Structural heart team evaluation for aortic stenosis  -Bumex gtt now off in setting of hypotension/vomiting/fever overnight  -Derm consult pending for LE rash  -Continue telemetry monitoring  -BB on hold  -Noting conduction disease and cardiomyopathy with LVEF 30% and LBBB with QRSd 176, she will need PPM prior to TAVR. Pt may warrant CRT-D placement. However, pt is not a candidate for CRT at this time noting infectious risk due to LE swelling/cellulitis risks in the setting of likely ESRD. Only safe option would be AV Micra as a temporizing measure. Still too unstable to undergo Micra placement.   -If pt is able to eventually undergo Micra AV placement and then TAVR, she could be reassessed later for the need of transvenous device with conduction sytem or CRT pacing if needed.   -Recommend repeat of her echo study done here 72yo woman with PMHx of HFrEF (EF 30%), mod AS, known 1st degree AVB and LBBB, HTN, IDDM1, CKD, hypothyroidism, chronic lymphedema, suspected OHS/LELIA on 3L NC home O2 p/w 1 episode of syncope. Recent admission at [facility] (3/29-4/5) for ADHF and DUNCAN s/p diuresis.   Pt states she does not remember her syncopal episode. She remembers walking to the car and getting into it. She denies any preceding symptoms. She was told that she "blacked out" and was not responding for 10-15 seconds with arm shaking. There is documentation that pt was dyspneic upon getting into car after walking but pt did not recall that on EP consultation 4/15.  Pt went to her cardiologist Dr. Nathan who recommended pt to come to ED.    EKG consistent with sinus bradycardia at 58bpm, 1st degree AVB (Sharath 318ms) with LBBB (QRSd 176). Early AM 4/14, she was noted to have 2:1 AV block. Pt also with e/o Wenckebach several times throughout the day on 4/14. She is being worked up for her aortic stenosis, likely severe in setting of decreased LVEF. She has significant LE edema and history of lymphedema with rash on lower legs. She was on Toprol XL 100mg at home but has not taken in since 4/12.    1) Syncope  2) 2:1 AV block with known 1st degree AVB (Sharath 318ms) with LBBB (QRSd 176)  3) HFrEF with LVEF 30%  4) Aortic stenosis, likely severe in setting of decreased LVEF  5) Lymphedema with significant LE edema and rash  6) Fever 4/16, undergoing infectious workup    Recommendations:  -Structural heart team evaluation for aortic stenosis  -Bumex gtt now off in setting of hypotension/vomiting/fever overnight  -Derm consult pending for LE rash  -Continue telemetry monitoring  -BB on hold  -She will need PPM prior to TAVR. Pt may warrant CRT-D placement noting conduction disease and cardiomyopathy with LVEF 30% and LBBB with QRSd 176. However, pt is not a candidate for CRT at this time noting infectious risk due to LE swelling/cellulitis risks in the setting of likely ESRD. Only safe option would be AV Micra as a temporizing measure. Still too unstable to undergo Micra placement currently (cannot lay flat).   -If pt is able to eventually undergo Micra AV placement and then TAVR, she could be reassessed down the line for the need of transvenous device with conduction sytem or CRT pacing if needed.   -Recommend repeat of her echo study done here

## 2024-04-16 NOTE — PROVIDER CONTACT NOTE (CHANGE IN STATUS NOTIFICATION) - ASSESSMENT
Patient's SBP in the 60'safter multiple attempt the highest is 74/30,patient states she is tired, skin appears flushed,s/p Tylenol IV for fever.Currentlyon Bumex gtt which is on hold due to hypotension.Patient did vomit while coughing large chunks of unchewed food-dinner, verbalises relief after.

## 2024-04-16 NOTE — PROGRESS NOTE ADULT - ATTENDING COMMENTS
73F PMH HFrEF (LVEF 30%), moderate AS, HTN, DM1.5, CKD3, hypothyroidism, chronic lymphadema, presents with episode of syncope. Notably, patient was recently discharged from [facility] about a week prior to admission. On day of admission, patient was with episode of shortness of breath, then sat down in car had had witnessed syncopal episode which lasted 10-15 seconds. No postictal state or incontinence.     #allergic reaction- delayed contrast allergic reaction with fever, rash, facial flushing and pruritis, N/V  - cultures sent, can f/u and continue abx until prelims back  - partner hesitant to do steroids on the chance there is infection  - cont benadryl 50mg iv q6 hours prn, topical calamine    #Syncope  - CT head noncon  - Carotid ultrasound   - Structural cardiology consult given results of most recent echo, cannot rule out AS  - Orthostatics   - Continue to monitor on tele, may warrant Zio patch as patient previously had halter, however no events recorded  - d/w structural, nephrology and patient- given reasonable risk to kidneys with contrast and possible TAVR; pt amenable to possibly needing HD  - will proceed with TAVR workup and imaging once renal function optimized understanding ongoing risk of possible HD  - derm consulted; skin rash not warranting urgent treatment and not a contraindication to TAVR  - pt/partner still debating overall GOC but will be based on the expected rigor of post-procedureal f/u as patient has suboptimal functional status    #Acute CHF  - Strict IO, daily standing weights  - switched to bumex gtt per cardiology recs. monitor bmp q12h  - monitor Cr closely. check UA, bladder scan    #DUNACN on CKD- modestly improving, continue bumex gtt    #Hyperglycemia, DMT2, uptitrate lantus to 25u hs and reasses    #Recheck TSH    The necessity of the time spent during the encounter on this date of service was due to:   - Ordering, reviewing, and interpreting labs, testing, and imaging.  - Independently obtaining a review of systems and performing a physical exam  - Reviewing prior hospitalization and where necessary, outpatient records.  - Counselling and educating patient and family regarding interpretation of aforementioned items and plan of care.    Time-based billing (NON-critical care). Total minutes spent: 57

## 2024-04-16 NOTE — PROGRESS NOTE ADULT - ASSESSMENT
73F w HFrEF (EF 30%), mod AS, known LBBB, HTN, IDDM1, CKD, hypothyroidism, chronic lymphedema, suspected OHS/LELIA on 3L NC home O2 p/w 1 episode of syncope. Recent admission at [facility] (3/29-4/5) for ADHF and DUNCAN s/p diuresis, discharged with new home O2 3L NC suspecting OHS/LELIA pending outpatient w/u. Since discharge a week ago, she has been staying at home with   Pt had sob walking to car. Once in car, she was still breathing heavily. Partner noticed pt was not responding to verbal stimuli for 10-15sec and witnessed R arm jerking. Pt gained consciousness quickly without postictal symptoms. Pt went to Cardiologist Dr. Nathan who recommended pt to come to ED. No fever, cp, abd pain, n/v. Leg swelling is improved from prior. Pt on torsemide 40mg which she has been taking. Pt was discharged on 3LNC which she is currently on. Patient reports she did not take Farxiga that she was discharged on as she was concerned about side effects.     In ED, patient was found afebrile, hemodynamically stable, breathing well on 3L NC. Initial labs notable for mild hyponatremia, DUNCAN on CKD, elevated proBNP and elevated pCO2 both improved from prior.  pt also noticed with abn creatinine      1- CKD IV   2- CHF   3- lymphedema   4- severe AS   5- syncope       creatinine is overall steady  TAVR work up in progress  EP consulted for syncope, recommending device placement for bradycardia  now with fevers/hypotension, ID workup in progress  abx per ID, should be renally dosed for GFR 18 cc/min  ?contrast allergy, given new rash on face, arms, neck, and fever/hypotension after iv contrast given  derm consult pending  structural heart team following, TAVR work up   continue bumex drip 0.5 mg/hr  she is non-oliguric  cont O2 and tele monitor   trend creatinine and electrolytes daily

## 2024-04-16 NOTE — PROGRESS NOTE ADULT - PROBLEM SELECTOR PLAN 1
RRT overnight for sepsis rectal temp 102, hypotension to systolic 70s, no leukocytosis.   CXR wnl  new onset vomiting clear  s/p 250 cc bolus x2, bumex gtt held    Plan:  -F/u Bcx, Ucx  -c/w Cefepime (PCN allergy) and Vanc  -F/u derm consult  -F/u MRSA swab RRT overnight for sepsis rectal temp 102, hypotension to systolic 70s, no leukocytosis.   CXR wnl  new onset vomiting clear  s/p 250 cc bolus x2, bumex gtt held    Plan:  -F/u Bcx, Ucx  -c/w Cefepime (PCN allergy) 2g q24h (renally dose) and Vanc, dose by level   -F/u derm consult  -F/u MRSA swab

## 2024-04-16 NOTE — PROGRESS NOTE ADULT - PROBLEM SELECTOR PLAN 7
Erythematous appearing but pt states improved from prior? Chronic, possible infection though no systemic signs.     - ctm  - elevate legs as tolerated, encourage ambulation  - compression stockings, ace wrapping  - Wound care following  - Dermatology consulted for lymphedema wounds as well as psoriatic lesions

## 2024-04-16 NOTE — PROGRESS NOTE ADULT - PROBLEM SELECTOR PROBLEM 1
Will call with strep and blood test results.     If negative strep can consider longer/tapered regimen of steroids.     If strep positive will treat with antibiotics.       Sepsis

## 2024-04-16 NOTE — PROGRESS NOTE ADULT - NUTRITIONAL ASSESSMENT
This patient has been assessed with a concern for Malnutrition and has been determined to have a diagnosis/diagnoses of Morbid obesity (BMI > 40).    This patient is being managed with:   Diet Regular-  Consistent Carbohydrate {Evening Snack} (CSTCHOSN)  DASH/TLC {Sodium & Cholesterol Restricted} (DASH)  1500mL Fluid Restriction ([id number])  Entered: Apr 12 2024  6:32PM

## 2024-04-16 NOTE — PROGRESS NOTE ADULT - SUBJECTIVE AND OBJECTIVE BOX
PROGRESS NOTE:   Authored by Dr. Mireya Hayden MD (PGY-1). Pager [facility] [phone]/ [facility]     Patient is a 73y old  Female who presents with a chief complaint of Syncope (15 Apr 2024 15:27)      SUBJECTIVE / OVERNIGHT EVENTS:  No acute events overnight.     All other ROS neg except as above in HPI    MEDICATIONS  (STANDING):  ascorbic acid 500 milliGRAM(s) Oral daily  aspirin  chewable 81 milliGRAM(s) Oral daily  atorvastatin 80 milliGRAM(s) Oral at bedtime  buMETAnide Infusion 0.5 mG/Hr (2.5 mL/Hr) IV Continuous <Continuous>  cefepime   IVPB 2000 milliGRAM(s) IV Intermittent every 12 hours  dextrose 10% Bolus 125 milliLiter(s) IV Bolus once  dextrose 5%. 1000 milliLiter(s) (100 mL/Hr) IV Continuous <Continuous>  dextrose 5%. 1000 milliLiter(s) (50 mL/Hr) IV Continuous <Continuous>  dextrose 50% Injectable 12.5 Gram(s) IV Push once  dextrose 50% Injectable 25 Gram(s) IV Push once  ferrous    sulfate 325 milliGRAM(s) Oral two times a day  glucagon  Injectable 1 milliGRAM(s) IntraMuscular once  heparin   Injectable 5000 Unit(s) SubCutaneous every 8 hours  insulin glargine Injectable (LANTUS) 25 Unit(s) SubCutaneous at bedtime  insulin lispro (ADMELOG) corrective regimen sliding scale   SubCutaneous at bedtime  insulin lispro (ADMELOG) corrective regimen sliding scale   SubCutaneous three times a day before meals  levothyroxine 75 MICROGram(s) Oral daily  multivitamin 1 Tablet(s) Oral daily  nystatin Powder 1 Application(s) Topical two times a day  pantoprazole    Tablet 40 milliGRAM(s) Oral before breakfast    MEDICATIONS  (PRN):  dextrose Oral Gel 15 Gram(s) Oral once PRN Blood Glucose LESS THAN 70 milliGRAM(s)/deciliter      CAPILLARY BLOOD GLUCOSE      POCT Blood Glucose.: 124 mg/dL (16 Apr 2024 00:12)  POCT Blood Glucose.: 165 mg/dL (15 Apr 2024 21:39)  POCT Blood Glucose.: 173 mg/dL (15 Apr 2024 17:15)  POCT Blood Glucose.: 164 mg/dL (15 Apr 2024 12:49)  POCT Blood Glucose.: 141 mg/dL (15 Apr 2024 08:59)    I&O's Summary    15 Apr 2024 07:01  -  16 Apr 2024 07:00  --------------------------------------------------------  IN: 240 mL / OUT: 1750 mL / NET: -1510 mL        PHYSICAL EXAM:  Vital Signs Last 24 Hrs  T(C): 37.4 (16 Apr 2024 06:03), Max: 39.3 (15 Apr 2024 22:46)  T(F): 99.4 (16 Apr 2024 06:03), Max: 102.7 (15 Apr 2024 22:46)  HR: 80 (16 Apr 2024 06:03) (62 - 108)  BP: 90/52 (16 Apr 2024 06:03) (90/52 - 135/69)  BP(mean): --  RR: 20 (16 Apr 2024 06:03) (18 - 20)  SpO2: 97% (16 Apr 2024 06:03) (97% - 99%)    Parameters below as of 16 Apr 2024 06:03  Patient On (Oxygen Delivery Method): nasal cannula  O2 Flow (L/min): 3      CONSTITUTIONAL: NAD, well-developed  HEET: MMM, EOMI, PERRLA  NECK: supple  RESPIRATORY: Normal respiratory effort; lungs are clear to auscultation bilaterally  CARDIOVASCULAR: Regular rate and rhythm, normal S1 and S2, no murmur/rub/gallop; No lower extremity edema; Peripheral pulses are 2+ bilaterally  ABDOMEN: Nontender to palpation, normoactive bowel sounds, no rebound/guarding; No hepatosplenomegaly  MUSCULOSKELETAL: no clubbing or cyanosis of digits; no joint swelling or tenderness to palpation  NEURO: Moving all four extremities, sensation grossly intact  PSYCH: A+O to person, place, and time; affect appropriate  SKIN: No rash    LABS:                        11.9   8.51  )-----------( 203      ( 16 Apr 2024 00:32 )             38.5     04-15    135  |  95<L>  |  65<H>  ----------------------------<  111<H>  4.3   |  20<L>  |  2.63<H>    Ca    8.9      15 Apr 2024 23:15  Phos  4.1     04-15  Mg     2.4     04-15    TPro  7.7  /  Alb  3.6  /  TBili  0.9  /  DBili  x   /  AST  23  /  ALT  9<L>  /  AlkPhos  71  04-15          Urinalysis Basic - ( 15 Apr 2024 23:15 )    Color: x / Appearance: x / SG: x / pH: x  Gluc: 111 mg/dL / Ketone: x  / Bili: x / Urobili: x   Blood: x / Protein: x / Nitrite: x   Leuk Esterase: x / RBC: x / WBC x   Sq Epi: x / Non Sq Epi: x / Bacteria: x          Tele Reviewed:    RADIOLOGY & ADDITIONAL TESTS:  Results Reviewed:   Imaging Personally Reviewed:  Electrocardiogram Personally Reviewed:     PROGRESS NOTE:   Authored by Dr. Mireya Hayden MD (PGY-1). Pager [facility] [phone]/ [facility]     Patient is a 73y old  Female who presents with a chief complaint of Syncope (15 Apr 2024 15:27)      SUBJECTIVE / OVERNIGHT EVENTS:  RRT overnight for fever and hypotension. feels flsuhed and warm today.     All other ROS neg except as above in HPI    MEDICATIONS  (STANDING):  ascorbic acid 500 milliGRAM(s) Oral daily  aspirin  chewable 81 milliGRAM(s) Oral daily  atorvastatin 80 milliGRAM(s) Oral at bedtime  buMETAnide Infusion 0.5 mG/Hr (2.5 mL/Hr) IV Continuous <Continuous>  cefepime   IVPB 2000 milliGRAM(s) IV Intermittent every 12 hours  dextrose 10% Bolus 125 milliLiter(s) IV Bolus once  dextrose 5%. 1000 milliLiter(s) (100 mL/Hr) IV Continuous <Continuous>  dextrose 5%. 1000 milliLiter(s) (50 mL/Hr) IV Continuous <Continuous>  dextrose 50% Injectable 12.5 Gram(s) IV Push once  dextrose 50% Injectable 25 Gram(s) IV Push once  ferrous    sulfate 325 milliGRAM(s) Oral two times a day  glucagon  Injectable 1 milliGRAM(s) IntraMuscular once  heparin   Injectable 5000 Unit(s) SubCutaneous every 8 hours  insulin glargine Injectable (LANTUS) 25 Unit(s) SubCutaneous at bedtime  insulin lispro (ADMELOG) corrective regimen sliding scale   SubCutaneous at bedtime  insulin lispro (ADMELOG) corrective regimen sliding scale   SubCutaneous three times a day before meals  levothyroxine 75 MICROGram(s) Oral daily  multivitamin 1 Tablet(s) Oral daily  nystatin Powder 1 Application(s) Topical two times a day  pantoprazole    Tablet 40 milliGRAM(s) Oral before breakfast    MEDICATIONS  (PRN):  dextrose Oral Gel 15 Gram(s) Oral once PRN Blood Glucose LESS THAN 70 milliGRAM(s)/deciliter      CAPILLARY BLOOD GLUCOSE      POCT Blood Glucose.: 124 mg/dL (16 Apr 2024 00:12)  POCT Blood Glucose.: 165 mg/dL (15 Apr 2024 21:39)  POCT Blood Glucose.: 173 mg/dL (15 Apr 2024 17:15)  POCT Blood Glucose.: 164 mg/dL (15 Apr 2024 12:49)  POCT Blood Glucose.: 141 mg/dL (15 Apr 2024 08:59)    I&O's Summary    15 Apr 2024 07:01  -  16 Apr 2024 07:00  --------------------------------------------------------  IN: 240 mL / OUT: 1750 mL / NET: -1510 mL        PHYSICAL EXAM:  Vital Signs Last 24 Hrs  T(C): 37.4 (16 Apr 2024 06:03), Max: 39.3 (15 Apr 2024 22:46)  T(F): 99.4 (16 Apr 2024 06:03), Max: 102.7 (15 Apr 2024 22:46)  HR: 80 (16 Apr 2024 06:03) (62 - 108)  BP: 90/52 (16 Apr 2024 06:03) (90/52 - 135/69)  BP(mean): --  RR: 20 (16 Apr 2024 06:03) (18 - 20)  SpO2: 97% (16 Apr 2024 06:03) (97% - 99%)    Parameters below as of 16 Apr 2024 06:03  Patient On (Oxygen Delivery Method): nasal cannula  O2 Flow (L/min): 3      CONSTITUTIONAL: NAD, well-developed, looks uncomfortable   HEET: MMM, EOMI, PERRLA  NECK: supple  RESPIRATORY: Normal respiratory effort; bibasilar crackles   CARDIOVASCULAR: Regular rate and rhythm, normal S1 and S2, chronic lymphedema   ABDOMEN: Nontender to palpation, normoactive bowel sounds, no rebound/guarding;  MUSCULOSKELETAL: full ROM   NEURO: Moving all four extremities, sensation grossly intact  PSYCH: A+O to person, place, and time; affect appropriate  SKIN: psoriatic lesions on b/l UE and lymphedema rashes on LE. flushed skin    LABS:                        11.9   8.51  )-----------( 203      ( 16 Apr 2024 00:32 )             38.5     04-15    135  |  95<L>  |  65<H>  ----------------------------<  111<H>  4.3   |  20<L>  |  2.63<H>    Ca    8.9      15 Apr 2024 23:15  Phos  4.1     04-15  Mg     2.4     04-15    TPro  7.7  /  Alb  3.6  /  TBili  0.9  /  DBili  x   /  AST  23  /  ALT  9<L>  /  AlkPhos  71  04-15          Urinalysis Basic - ( 15 Apr 2024 23:15 )    Color: x / Appearance: x / SG: x / pH: x  Gluc: 111 mg/dL / Ketone: x  / Bili: x / Urobili: x   Blood: x / Protein: x / Nitrite: x   Leuk Esterase: x / RBC: x / WBC x   Sq Epi: x / Non Sq Epi: x / Bacteria: x

## 2024-04-17 NOTE — DISCHARGE NOTE PROVIDER - NSDCCPCAREPLAN_GEN_ALL_CORE_FT
PRINCIPAL DISCHARGE DIAGNOSIS  Diagnosis: Aortic stenosis  Assessment and Plan of Treatment: You had loss of consciousness which brought you in to the hospital. This was likely from your severe aortic stenosis.   Aortic stenosis is a condition where the aortic valve, which controls blood flow from the heart to the rest of the body, doesn't open properly. Think of the aortic valve like a door between the heart and the body's main blood vessel, the aorta. When this valve is too narrow or stiff, it restricts the flow of blood from the heart to the rest of the body.  This narrowing makes the heart work harder to pump blood through the smaller opening, which can lead to symptoms like chest pain, shortness of breath, fatigue, and feeling lightheaded or dizzy. Over time, it can weaken the heart muscle and increase the risk of other heart problems.  When narrowing of the aortic valve is severe and especially if it's causing symptoms or evidence of heart damage, repair or replacement may be recommended.      SECONDARY DISCHARGE DIAGNOSES  Diagnosis: Sepsis  Assessment and Plan of Treatment: You had an episode of fever and low blood pressure as well as new rash. This could be due to an allergic reaction from IV contrast used during your imaging vs infection. Workup showed ______      Diagnosis: Acute decompensated heart failure  Assessment and Plan of Treatment:     Diagnosis: Acute kidney injury superimposed on CKD  Assessment and Plan of Treatment: Your kidney became more injured after receiving IV contrast for your imagings. You stopped producing urine and went into renal failure and so, was placed on dialysis.    Diagnosis: Lymphedema  Assessment and Plan of Treatment:     Diagnosis: Aortic stenosis  Assessment and Plan of Treatment:      PRINCIPAL DISCHARGE DIAGNOSIS  Diagnosis: Aortic stenosis  Assessment and Plan of Treatment: You had loss of consciousness which brought you in to the hospital. This was likely from your severe aortic stenosis.   Aortic stenosis is a condition where the aortic valve, which controls blood flow from the heart to the rest of the body, doesn't open properly. Think of the aortic valve like a door between the heart and the body's main blood vessel, the aorta. When this valve is too narrow or stiff, it restricts the flow of blood from the heart to the rest of the body.  This narrowing makes the heart work harder to pump blood through the smaller opening, which can lead to symptoms like chest pain, shortness of breath, fatigue, and feeling lightheaded or dizzy. Over time, it can weaken the heart muscle and increase the risk of other heart problems.  When narrowing of the aortic valve is severe and especially if it's causing symptoms or evidence of heart damage, repair or replacement may be recommended.      SECONDARY DISCHARGE DIAGNOSES  Diagnosis: Acute decompensated heart failure  Assessment and Plan of Treatment:     Diagnosis: Acute kidney injury superimposed on CKD  Assessment and Plan of Treatment: Your kidney became more injured after receiving IV contrast for your imagings. You stopped producing urine and went into renal failure and so, was placed on dialysis.    Diagnosis: Lymphedema  Assessment and Plan of Treatment:     Diagnosis: Contrast media allergy  Assessment and Plan of Treatment: You had an episode of fever and low blood pressure as well as new rash. This could be due to an allergic reaction from IV contrast used for CT scans. You were treated with benadryl and topical medications.   Will need to pre-medicate prior to receiving contrast in the future if needed.  If you have worsening rash, low blood pressure, fever, come to [facility].     PRINCIPAL DISCHARGE DIAGNOSIS  Diagnosis: Aortic stenosis  Assessment and Plan of Treatment: You had loss of consciousness which brought you in to the hospital. This was likely from your severe aortic stenosis.   Aortic stenosis is a condition where the aortic valve, which controls blood flow from the heart to the rest of the body, doesn't open properly. Think of the aortic valve like a door between the heart and the body's main blood vessel, the aorta. When this valve is too narrow or stiff, it restricts the flow of blood from the heart to the rest of the body.  This narrowing makes the heart work harder to pump blood through the smaller opening, which can lead to symptoms like chest pain, shortness of breath, fatigue, and feeling lightheaded or dizzy. Over time, it can weaken the heart muscle and increase the risk of other heart problems.  When narrowing of the aortic valve is severe and especially if it's causing symptoms or evidence of heart damage, repair or replacement may be recommended. You underwent an aortic valve repair during your hospitalization. The procedure was complicated by abnormal heart rhythms requiring multiple shocks, intubation and escalation of care to the coronary intensive care unit. You have now improved and ready for discharge to rehab.      SECONDARY DISCHARGE DIAGNOSES  Diagnosis: Acute decompensated heart failure  Assessment and Plan of Treatment: You were found to have decreased heart function. However, given that your blood pressures were low and required midodrine, we were unable to restart many of the goal-direct medical therapy that you were previously on for heart failure. Please follow up with your cardiologist in 1-2 weeks to discuss reinitiating medications that would help improve your heart function. If you develop any chest pain, palpitations or worsening shortness of breath, please seek medical attention.    Diagnosis: Acute kidney injury superimposed on CKD  Assessment and Plan of Treatment: Your kidney became more injured after receiving IV contrast for your imagings. You stopped producing urine and went into renal failure and  required dialysis for a period of time. Your kidney function has now stabilized. Please continue to follow with the nephrologists in 1-2 weeks for monitoring of kidney funciton.    Diagnosis: Lymphedema  Assessment and Plan of Treatment: You were followed by the wound care doctors during your stay. There was low concern for ongoing infection. Please continue to follow up in the outpatient setting for monitoring of chronic swelling and wounds.    Diagnosis: Contrast media allergy  Assessment and Plan of Treatment: During your hospitalization, you had an episode of fever, low blood pressure, and a  new rash. This could be due to an allergic reaction from IV contrast used for CT scans. You were treated with benadryl and topical medications. You were also seen by the dermatology team here in the hospital. They took a biopsy that supported the findings that you had an allergic reaction.   In the future, you will need to pre-medicate prior to receiving contrast in the future if needed. If you develop any worsening rash, shortness of breath, fever, please seek medical attention.    Diagnosis: Urinary tract infection  Assessment and Plan of Treatment: You were also treated for a urinary tract infection during your hospital stay. Your urine culture grew vancomycin resistent enterococcus faecalis.     PRINCIPAL DISCHARGE DIAGNOSIS  Diagnosis: Aortic stenosis  Assessment and Plan of Treatment: You had loss of consciousness which brought you in to the hospital. This was likely from your severe aortic stenosis.   Aortic stenosis is a condition where the aortic valve, which controls blood flow from the heart to the rest of the body, doesn't open properly. Think of the aortic valve like a door between the heart and the body's main blood vessel, the aorta. When this valve is too narrow or stiff, it restricts the flow of blood from the heart to the rest of the body.  This narrowing makes the heart work harder to pump blood through the smaller opening, which can lead to symptoms like chest pain, shortness of breath, fatigue, and feeling lightheaded or dizzy. Over time, it can weaken the heart muscle and increase the risk of other heart problems.  When narrowing of the aortic valve is severe and especially if it's causing symptoms or evidence of heart damage, repair or replacement may be recommended. You underwent an aortic valve repair during your hospitalization. The procedure was complicated by abnormal heart rhythms requiring multiple shocks, intubation and escalation of care to the coronary intensive care unit. You have now improved and ready for discharge to rehab.      SECONDARY DISCHARGE DIAGNOSES  Diagnosis: Orthostatic hypotension  Assessment and Plan of Treatment: You had difficulty maintaining your blood pressure when you would transition from lying down to sitting up or standing up.  We gave you midodrine, droxydopa, and fludrocortisone with *********************improvement?**********************    Diagnosis: Acute decompensated heart failure  Assessment and Plan of Treatment: You were found to have decreased heart function. However, given that your blood pressures were low and required midodrine, we were unable to restart many of the goal-direct medical therapy that you were previously on for heart failure. Please follow up with your cardiologist in 1-2 weeks to discuss reinitiating medications that would help improve your heart function. If you develop any chest pain, palpitations or worsening shortness of breath, please seek medical attention.    Diagnosis: Acute kidney injury superimposed on CKD  Assessment and Plan of Treatment: Your kidney became more injured after receiving IV contrast for your imagings. You stopped producing urine and went into renal failure and  required dialysis for a period of time. You ultimately improved with fluids.  Your kidney function has now stabilized. Please continue to follow with the nephrologists in 1-2 weeks for monitoring of kidney funciton.    Diagnosis: Deep vein thrombosis (DVT) of left upper extremity  Assessment and Plan of Treatment: You were found to have a DVT of your left arm.  You were treated with therapeutic Eliquis.  Please continue to take eliquis when you are discharged at the prescribed dose.    Diagnosis: Lymphedema  Assessment and Plan of Treatment: You were followed by the wound care doctors during your stay. There was low concern for ongoing infection. Please continue to follow up in the outpatient setting for monitoring of chronic swelling and wounds.    Diagnosis: Contrast media allergy  Assessment and Plan of Treatment: During your hospitalization, you had an episode of fever, low blood pressure, and a  new rash. This could be due to an allergic reaction from IV contrast used for CT scans. You were treated with benadryl and topical medications. You were also seen by the dermatology team here in the hospital. They took a biopsy that supported the findings that you had an allergic reaction.   In the future, you will need to pre-medicate prior to receiving contrast in the future if needed. If you develop any worsening rash, shortness of breath, fever, please seek medical attention.    Diagnosis: GI bleed  Assessment and Plan of Treatment: You had an episode of reddish stool in the hospital.  This was likely secondary to acute illness as well as being on a blood thinner.  This quickly resolved and you were placed on a proton pump inhibitor.  You were seen by a gastroenterologist    Diagnosis: Type 1 diabetes mellitus  Assessment and Plan of Treatment: You have diabetes.  You were seen by endocrinology while in the hospital.  They adjusted your insulin dosing, but unfortunately you had an episode of diabetic ketoacidosis.  This quickly resolved; however, it was difficult to manage your glucose inpatient    Diagnosis: Urinary tract infection  Assessment and Plan of Treatment: You were also treated for a urinary tract infection during your hospital stay. Your urine culture grew vancomycin resistant enterococcus faecalis.     PRINCIPAL DISCHARGE DIAGNOSIS  Diagnosis: Aortic stenosis  Assessment and Plan of Treatment: You had loss of consciousness which brought you in to the hospital. This was likely from your severe aortic stenosis.   Aortic stenosis is a condition where the aortic valve, which controls blood flow from the heart to the rest of the body, doesn't open properly. Think of the aortic valve like a door between the heart and the body's main blood vessel, the aorta. When this valve is too narrow or stiff, it restricts the flow of blood from the heart to the rest of the body.  This narrowing makes the heart work harder to pump blood through the smaller opening, which can lead to symptoms like chest pain, shortness of breath, fatigue, and feeling lightheaded or dizzy. Over time, it can weaken the heart muscle and increase the risk of other heart problems.  When narrowing of the aortic valve is severe and especially if it's causing symptoms or evidence of heart damage, repair or replacement may be recommended. You underwent an aortic valve repair during your hospitalization. The procedure was complicated by abnormal heart rhythms requiring multiple shocks, intubation and escalation of care to the coronary intensive care unit. You have now improved and ready for discharge to rehab.      SECONDARY DISCHARGE DIAGNOSES  Diagnosis: Acute decompensated heart failure  Assessment and Plan of Treatment: You were found to have decreased heart function. However, given that your blood pressures were low and required midodrine, we were unable to restart many of the goal-direct medical therapy that you were previously on for heart failure. Please follow up with your cardiologist in 1-2 weeks to discuss reinitiating medications that would help improve your heart function. If you develop any chest pain, palpitations or worsening shortness of breath, please seek medical attention.    Diagnosis: Acute kidney injury superimposed on CKD  Assessment and Plan of Treatment: Your kidney became more injured after receiving IV contrast for your imagings. You stopped producing urine and went into renal failure and  required dialysis for a period of time. You ultimately improved with fluids.  Your kidney function has now stabilized. Please continue to follow with the nephrologists in 1-2 weeks for monitoring of kidney funciton.    Diagnosis: Lymphedema  Assessment and Plan of Treatment: You were followed by the wound care doctors during your stay. There was low concern for ongoing infection. Please continue to follow up in the outpatient setting for monitoring of chronic swelling and wounds.    Diagnosis: Contrast media allergy  Assessment and Plan of Treatment: During your hospitalization, you had an episode of fever, low blood pressure, and a  new rash. This could be due to an allergic reaction from IV contrast used for CT scans. You were treated with benadryl and topical medications. You were also seen by the dermatology team here in the hospital. They took a biopsy that supported the findings that you had an allergic reaction.   In the future, you will need to pre-medicate prior to receiving contrast in the future if needed. If you develop any worsening rash, shortness of breath, fever, please seek medical attention.    Diagnosis: Urinary tract infection  Assessment and Plan of Treatment: You were also treated for a urinary tract infection during your hospital stay. Your urine culture grew vancomycin resistant enterococcus faecalis.    Diagnosis: GI bleed  Assessment and Plan of Treatment: You had an episode of reddish stool in the hospital.  This was likely secondary to acute illness as well as being on a blood thinner.  This quickly resolved and you were placed on a proton pump inhibitor.    Diagnosis: Orthostatic hypotension  Assessment and Plan of Treatment: You had difficulty maintaining your blood pressure when you would transition from lying down to sitting up or standing up.  We gave you several medications to help improve your blood pressure including  midodrine, droxydopa, pyridostigmine and fludrocortisone. Eventually your blood pressure improved and you were able to tolerate ****    Diagnosis: Type 1 diabetes mellitus  Assessment and Plan of Treatment: You have diabetes.  You were seen by endocrinology while in the hospital.  They adjusted your insulin dosing, but unfortunately you had an episode of diabetic ketoacidosis.  This quickly resolved; however, it was difficult to manage your glucose inpatient    Diagnosis: Deep vein thrombosis (DVT) of left upper extremity  Assessment and Plan of Treatment: You were found to have a DVT of your left arm.  You were treated with therapeutic Eliquis.  Please continue to take eliquis when you are discharged at the prescribed dose.

## 2024-04-17 NOTE — PROGRESS NOTE ADULT - PROBLEM SELECTOR PLAN 5
Unclear baseline SCr likely 2.1-2.2  Now with increasing Cr.   Fe 1.6, indeterminant       - trend BUN/SCr, avoid nephrotoxic, renally dose all meds  - strict I/Os  - Nephro following Unclear baseline SCr likely 2.1-2.2  Now with increasing Cr. and anuric    Plan:  - trend BUN/SCr, avoid nephrotoxic, renally dose all meds  - strict I/Os  - Nephro following, consent for HD

## 2024-04-17 NOTE — PROGRESS NOTE ADULT - ASSESSMENT
73F w HFrEF (EF 30%), mod AS, known LBBB, HTN, IDDM1, CKD, hypothyroidism, chronic lymphedema, p/w 1 episode of syncope with R arm jerking.     #Syncope-Aortic Stenosis  - Episode after exertion  - Known Aortic Stenosis likely Severe in setting of decreased LVEF  - Structural Heart evaluation in progress    #Chronic Systolic HF (EF 30%), mod to severe AS, HTN, LBBB, Lymphedema   - Has known systolic HF and AS.    - Repeat TTE showed EF 30%, mild-mod LVH, mildly reduced RVF, mod-severe AS (.61 cmsq), mod pHTN  - Had cath 2022-Non obstructive CAD  - On home Torsemide 20 mg daily, Eplerenone 25 mg daily, and Toprol  mg daily  - Compliant with all meds including Torsemide, though, dose was reduced to Furosemide 40mg once daily.   - proBNP 51K from 80k.   - Consideration for TAVR  - Was on bumex gtt but then overnight 4/15 developed a fever to 102F and became hypotensive overnight. Bumex gtt stopped, given 250cc bolus. BPs remain low. UOP has dropped. I suspect she is intravascularly depleted.   - Would hold GDMT and Lasix/Bumex for the time being. She remains on her home O2 regimen and is currently asymptomatic.   - Currently on Cefepime.     # Acute kidney injury superimposed on CKD.   - creatinine trend stable, needs repeat labs this morning  - Needs strict monitoring of her urine output  - cont to monitor      #LBBB  Known LBBB  Noted intermittent Wenkebach on telemetry with bradycardia and 2:1 AV conduction  - EP has been consulted. For possible CRT-D vs AV micra as temporary measure.   - BB on hold    Will continue to follow closely.

## 2024-04-17 NOTE — PROGRESS NOTE ADULT - ASSESSMENT
73 y.o. woman with PMHx of HFrEF (EF 30%), mod AS, known 1st degree AVB and LBBB, HTN, IDDM1, CKD, hypothyroidism, chronic lymphedema, suspected OHS/LELIA on 3L NC home O2 p/w 1 episode of syncope. Recent admission at [facility] (3/29-4/5) for ADHF and DUNCAN s/p diuresis.   Pt states she does not remember her syncopal episode. She remembers walking to the car and getting into it. She denies any preceding symptoms. She was told that she "blacked out" and was not responding for 10-15 seconds with arm shaking. There is documentation that pt was dyspneic upon getting into car after walking but pt did not recall that on EP consultation 4/15.  Pt went to her cardiologist Dr. Nathan who recommended pt to come to ED.    EKG consistent with sinus bradycardia at 58bpm, 1st degree AVB (Sharath 318ms) with LBBB (QRSd 176). Early AM 4/14, she was noted to have 2:1 AV block. Pt also with Elizabeth several times throughout the day on 4/14. She is being worked up for her aortic stenosis, likely severe in setting of decreased LVEF. She has significant LE edema and history of lymphedema with rash on lower legs. She was on Toprol XL 100mg at home but has not taken in since 4/12.    1) Syncope  2) 2:1 AV block with known 1st degree AVB (Sharath 318ms) with LBBB (QRSd 176)  3) HFrEF with LVEF 30%  4) Aortic stenosis, likely severe in setting of decreased LVEF  5) Lymphedema with significant LE edema and rash  6) Fever 4/16, undergoing infectious workup    -Structural heart team with likely plan for TAVR out patient   -Being diuresed with Bumex gtt    -Derm consult at bedside for LE rash   -Continue telemetry monitoring  -BB on hold  -She will need PPM prior to TAVR. Pt would warrant CRT-D placement noting conduction disease and cardiomyopathy with LVEF 30% and LBBB with QRSd 176. However, pt is not a candidate for CRT at this time noting infectious risk due to LE swelling/cellulitis risks in the setting of likely ESRD. Only safe option would be AV Micra as a temporizing measure. Still too unstable to undergo Micra placement currently (cannot lay flat).   -If pt is able to eventually undergo Micra AV placement and then TAVR, she could be reassessed down the line for the need of transvenous device with conduction sytem or CRT pacing if needed.     AMANDA Montano [facility]  [id number]   73 y.o. woman with PMHx of HFrEF (EF 30%), mod AS, known 1st degree AVB and LBBB, HTN, IDDM1, CKD, hypothyroidism, chronic lymphedema, suspected OHS/LELIA on 3L NC home O2 p/w 1 episode of syncope. Recent admission at [facility] (3/29-4/5) for ADHF and DUNCAN s/p diuresis.   Pt states she does not remember her syncopal episode. She remembers walking to the car and getting into it. She denies any preceding symptoms. She was told that she "blacked out" and was not responding for 10-15 seconds with arm shaking. There is documentation that pt was dyspneic upon getting into car after walking but pt did not recall that on EP consultation 4/15.  Pt went to her cardiologist Dr. Nathan who recommended pt to come to ED.    EKG consistent with sinus bradycardia at 58bpm, 1st degree AVB (Sharath 318ms) with LBBB (QRSd 176). Early AM 4/14, she was noted to have 2:1 AV block. Pt also with Elizabeth several times throughout the day on 4/14. She is being worked up for her aortic stenosis, likely severe in setting of decreased LVEF. She has significant LE edema and history of lymphedema with rash on lower legs. She was on Toprol XL 100mg at home but has not taken in since 4/12.    1) Syncope  2) 2:1 AV block with known 1st degree AVB (Sharath 318ms) with LBBB (QRSd 176)  3) HFrEF with LVEF 30%  4) Aortic stenosis, likely severe in setting of decreased LVEF  5) Lymphedema with significant LE edema and rash  6) Fever 4/16, undergoing infectious workup    -Structural heart team with likely plan for TAVR out patient   -Being diuresed with Bumex gtt    -Derm consult at bedside for erythematous rash   -Continue telemetry monitoring  -BB on hold  -She will need PPM prior to TAVR. Pt would warrant CRT-D placement noting conduction disease and cardiomyopathy with LVEF 30% and LBBB with QRSd 176. However, pt is not a candidate for CRT at this time noting infectious risk due to LE swelling/cellulitis risks in the setting of likely ESRD. Only safe option would be AV Micra as a temporizing measure. Still too unstable to undergo Micra placement currently (cannot lay flat).   -If pt is able to eventually undergo Micra AV placement and then TAVR, she could be reassessed down the line for the need of transvenous device with conduction sytem or CRT pacing if needed.     AMANDA Montano [facility]  [id number]   73 y.o. woman with PMHx of HFrEF (EF 30%), mod AS, known 1st degree AVB and LBBB, HTN, IDDM1, CKD, hypothyroidism, chronic lymphedema, suspected OHS/LELIA on 3L NC home O2 p/w 1 episode of syncope. Recent admission at [facility] (3/29-4/5) for ADHF and DUNCAN s/p diuresis.   Pt states she does not remember her syncopal episode. She remembers walking to the car and getting into it. She denies any preceding symptoms. She was told that she "blacked out" and was not responding for 10-15 seconds with arm shaking. There is documentation that pt was dyspneic upon getting into car after walking but pt did not recall that on EP consultation 4/15.  Pt went to her cardiologist Dr. Nathan who recommended pt to come to ED.    EKG consistent with sinus bradycardia at 58bpm, 1st degree AVB (Sharath 318ms) with LBBB (QRSd 176). Early AM 4/14, she was noted to have 2:1 AV block. Pt also with Elizabeth several times throughout the day on 4/14. She is being worked up for her aortic stenosis, likely severe in setting of decreased LVEF. She has significant LE edema and history of lymphedema with rash on lower legs. She was on Toprol XL 100mg at home but has not taken in since 4/12.    1) Syncope  2) 2:1 AV block with known 1st degree AVB (Sharath 318ms) with LBBB (QRSd 176)  3) HFrEF with LVEF 30%  4) Aortic stenosis, likely severe in setting of decreased LVEF  5) Lymphedema with significant LE edema and rash  6) Fever 4/16, undergoing infectious workup    -Structural heart team with likely plan for TAVR out patient   -Being diuresed with Bumex gtt    -Derm consult at bedside for erythematous rash   -Continue telemetry monitoring  -BB on hold  -She will need PPM prior to TAVR. Pt would warrant CRT-D placement noting conduction disease and cardiomyopathy with LVEF 30% and LBBB with QRSd 176. However, pt is not a candidate for CRT at this time noting infectious risk due to LE swelling/cellulitis risks in the setting of likely ESRD. Only safe option would be AV Micra as a temporizing measure. Still too unstable to undergo Micra placement currently (cannot lay flat).   -If pt is able to eventually undergo Micra AV placement and then TAVR, she could be reassessed down the line for the need of transvenous device with conduction sytem or CRT pacing if needed.   - EP will sign off for now, reconsult when patient is medically ready for pacemaker implant    AMANDA Montano [facility]  [id number]

## 2024-04-17 NOTE — DISCHARGE NOTE PROVIDER - CARE PROVIDER_API CALL
Onesimo Gandhi  Physician Assistant Services  [address] [facility], [address]  [place] [address]  Phone: [phone]  Fax: [phone]  Established Patient  Follow Up Time: 1 month    Jimmy Martell  Interventional Cardiology  [address] [facility], [address] [place] [id number]  Phone: [phone]  Fax: [phone]  Follow Up Time: 2 weeks   Onesimo Gandhi  Physician Assistant Services  [address] [facility], [address]  [place] [address]  Phone: [phone]  Fax: [phone]  Established Patient  Follow Up Time: 1 month    Jimmy Martell  Interventional Cardiology  [address], [address] [place] [id number]  Phone: [phone]  Fax: [phone]  Follow Up Time: 2 weeks    Renee Meza  Foot and Ankle Surgery  [address] [place] [address], [place] [address]  Phone: [phone]  Fax: [phone]  Follow Up Time:

## 2024-04-17 NOTE — CONSULT NOTE ADULT - ASSESSMENT
73 year old with  LELIA  DM  CKD  ADHF  recently discharged after treatment for CHF.  admitted for sob  pt developed a diffuse rash all over body after receiving  CT contrast   low grade levels diffuse  no pna    suspect that this as all anallergic reaction to contrast  media. rash that is pustular in some locations  no MM involvement     no contraindication to steroids   dc antibiotics once the cultures are negative at 48 hr   Baseline QuantiFeron

## 2024-04-17 NOTE — DISCHARGE NOTE PROVIDER - NSDCCAREPROVSEEN_GEN_ALL_CORE_FT
[facility] Medicine Team 2  Structural Cardiology  Electrophysiology  Nephrology  Dermatology   [facility] Medicine Team 2, 5  Structural Cardiology  Electrophysiology  Nephrology  Dermatology

## 2024-04-17 NOTE — PROGRESS NOTE ADULT - SUBJECTIVE AND OBJECTIVE BOX
[facility] - Howard Kimble, Carlos Luong, Herman Alston  Office Number:  [phone]    Patient resting comfortably in bed in NAD.  Laying flat with no respiratory distress.    Remains with borderline BPs  Remains off bumex gtt      ROS: negative unless otherwise mentioned.    Telemetry: SR    MEDICATIONS  (STANDING):  ascorbic acid 500 milliGRAM(s) Oral daily  aspirin  chewable 81 milliGRAM(s) Oral daily  atorvastatin 80 milliGRAM(s) Oral at bedtime  buMETAnide Infusion 0.5 mG/Hr (2.5 mL/Hr) IV Continuous <Continuous>  calamine/zinc oxide Lotion 1 Application(s) Topical three times a day  cefepime   IVPB 2000 milliGRAM(s) IV Intermittent every 24 hours  dextrose 10% Bolus 125 milliLiter(s) IV Bolus once  dextrose 5%. 1000 milliLiter(s) (100 mL/Hr) IV Continuous <Continuous>  dextrose 5%. 1000 milliLiter(s) (50 mL/Hr) IV Continuous <Continuous>  dextrose 50% Injectable 12.5 Gram(s) IV Push once  dextrose 50% Injectable 25 Gram(s) IV Push once  ferrous    sulfate 325 milliGRAM(s) Oral two times a day  glucagon  Injectable 1 milliGRAM(s) IntraMuscular once  heparin   Injectable 5000 Unit(s) SubCutaneous every 8 hours  insulin glargine Injectable (LANTUS) 12 Unit(s) SubCutaneous at bedtime  insulin lispro (ADMELOG) corrective regimen sliding scale   SubCutaneous at bedtime  insulin lispro (ADMELOG) corrective regimen sliding scale   SubCutaneous three times a day before meals  lactated ringers. 500 milliLiter(s) (100 mL/Hr) IV Continuous <Continuous>  levothyroxine 75 MICROGram(s) Oral daily  multivitamin 1 Tablet(s) Oral daily  mupirocin 2% Nasal 1 Application(s) Both Nostrils two times a day  nystatin    Suspension 401687 Unit(s) Oral four times a day  nystatin/triamcinolone Cream 1 Application(s) Topical every 12 hours  pantoprazole    Tablet 40 milliGRAM(s) Oral before breakfast  triamcinolone 0.1% Ointment 1 Application(s) Topical two times a day    MEDICATIONS  (PRN):  dextrose Oral Gel 15 Gram(s) Oral once PRN Blood Glucose LESS THAN 70 milliGRAM(s)/deciliter  diphenhydrAMINE Injectable 50 milliGRAM(s) IV Push every 4 hours PRN Rash and/or Itching  ondansetron Injectable 4 milliGRAM(s) IV Push every 6 hours PRN Nausea and/or Vomiting      Allergies    contrast media (iodine-based) (Fever; Vomiting; Flushing; Hypotension; Rash; Stomach Upset)  Ativan (Rash; Urticaria)  penicillins (Hives)    Intolerances        Vital Signs Last 24 Hrs  T(C): 36.7 (17 Apr 2024 04:24), Max: 37.1 (16 Apr 2024 11:52)  T(F): 98.1 (17 Apr 2024 04:24), Max: 98.7 (16 Apr 2024 11:52)  HR: 108 (17 Apr 2024 05:10) (76 - 108)  BP: 93/53 (17 Apr 2024 05:10) (89/52 - 105/62)  BP(mean): --  RR: 18 (17 Apr 2024 04:24) (17 - 18)  SpO2: 93% (17 Apr 2024 04:24) (93% - 98%)    Parameters below as of 17 Apr 2024 04:24  Patient On (Oxygen Delivery Method): nasal cannula  O2 Flow (L/min): 3      I&O's Summary    16 Apr 2024 07:01  -  17 Apr 2024 07:00  --------------------------------------------------------  IN: 770 mL / OUT: 0 mL / NET: 770 mL        ON EXAM:  General: NAD, awake and alert, oriented x 3, obese  HEENT: Mucous membranes are moist, anicteric  Lungs: Non-labored, breath sounds are decreased bilaterally, No wheezing, rales or rhonchi  Cardiovascular: Regular, S1 and S2, +SM at ru/lusb  Gastrointestinal: Bowel Sounds present, soft, nontender.   Lymph: chronic moderate peripheral edema with venous changes and rash.   Skin: rash on legs  Psych:  Mood & affect appropriate    LABS: All Labs Reviewed:                        12.6   14.94 )-----------( 220      ( 16 Apr 2024 09:50 )             42.6                         11.9   8.51  )-----------( 203      ( 16 Apr 2024 00:32 )             38.5                         10.3   5.22  )-----------( 183      ( 15 Apr 2024 06:52 )             34.6     16 Apr 2024 06:47    135    |  94     |  66     ----------------------------<  54     5.1     |  26     |  2.81   15 Apr 2024 23:15    135    |  95     |  65     ----------------------------<  111    4.3     |  20     |  2.63   15 Apr 2024 06:52    137    |  96     |  66     ----------------------------<  179    4.3     |  29     |  2.60     Ca    8.4        16 Apr 2024 06:47  Ca    8.9        15 Apr 2024 23:15  Ca    8.6        15 Apr 2024 06:52  Phos  3.5       16 Apr 2024 06:47  Phos  4.1       15 Apr 2024 06:52  Mg     2.1       16 Apr 2024 06:47  Mg     2.4       15 Apr 2024 06:52    TPro  6.5    /  Alb  3.1    /  TBili  1.0    /  DBili  x      /  AST  28     /  ALT  8      /  AlkPhos  45     16 Apr 2024 06:47  TPro  7.7    /  Alb  3.6    /  TBili  0.9    /  DBili  x      /  AST  23     /  ALT  9      /  AlkPhos  71     15 Apr 2024 23:15  TPro  6.9    /  Alb  3.3    /  TBili  0.6    /  DBili  x      /  AST  15     /  ALT  8      /  AlkPhos  63     15 Apr 2024 06:52          Blood Culture: Organism --  Gram Stain Blood -- Gram Stain --  Specimen Source .Blood Blood-Peripheral  Culture-Blood --    Organism --  Gram Stain Blood -- Gram Stain --  Specimen Source .Blood Blood-Peripheral  Culture-Blood --    TTE 4/1/24:  CONCLUSIONS:   1. Technically difficult image quality.   2. Left ventricular systolic function is moderately to severely decreased with an ejection fraction of 30 % by Nunes's method of disks.   3. Mildto moderate left ventricular hypertrophy.   4. The right ventricle is not well visualized. Based on visual assessment, the right ventricle appears mildly enlarged. mildly reduced systolic function.   5. The left atrium is mildly dilated.   6. Trace aortic regurgitation.   7. Mild mitral regurgitation.   8. Moderate to severe aortic stenosis. There is a Vmax of 3.66m/s, Mean gradient of 26mmHg and a DI of 0.19. The JUAN DANIEL is calculated as 0.61cm2 by continuity. This likely represents low flow low gradient AS.   9. Estimated pulmonary artery systolic pressure is 59 mmHg, consistent with moderate pulmonary hypertension.  10. The inferior vena cava is dilated measuring 2.60 cm in diameter, (dilated >2.1cm) with normal inspiratory collapse (normal >50%) consistent with mildly elevated right atrial pressure (~8, range 5-10mmHg).

## 2024-04-17 NOTE — PROGRESS NOTE ADULT - NUTRITIONAL ASSESSMENT
This patient has been assessed with a concern for Malnutrition and has been determined to have a diagnosis/diagnoses of Morbid obesity (BMI > 40).    This patient is being managed with:   Diet Clear Liquid-  1500mL Fluid Restriction ([id number])  Entered: Apr 17 2024 10:14AM

## 2024-04-17 NOTE — PROGRESS NOTE ADULT - ASSESSMENT
73F w HFrEF (EF 30%), mod AS, known LBBB, HTN, IDDM1, CKD, hypothyroidism, chronic lymphedema, suspected OHS/LEILA on 3L NC home O2 p/w 1 episode of syncope. Recent admission at [facility] (3/29-4/5) for ADHF and DUNCAN s/p diuresis, discharged with new home O2 3L NC suspecting OHS/LELIA pending outpatient w/u. Since discharge a week ago, she has been staying at home with   Pt had sob walking to car. Once in car, she was still breathing heavily. Partner noticed pt was not responding to verbal stimuli for 10-15sec and witnessed R arm jerking. Pt gained consciousness quickly without postictal symptoms. Pt went to Cardiologist Dr. Nathan who recommended pt to come to ED. No fever, cp, abd pain, n/v. Leg swelling is improved from prior. Pt on torsemide 40mg which she has been taking. Pt was discharged on 3LNC which she is currently on. Patient reports she did not take Farxiga that she was discharged on as she was concerned about side effects.     In ED, patient was found afebrile, hemodynamically stable, breathing well on 3L NC. Initial labs notable for mild hyponatremia, DUNCAN on CKD, elevated proBNP and elevated pCO2 both improved from prior.  pt also noticed with abn creatinine      1- CKD IV  with DUNCAN   2- CHF   3- lymphedema   4- severe AS   5- syncope       creatinine is worsening and pt with anuria this am   albumin infusion 25% 50 CC iv with bumex 2mg ivp followed by bumex drip 1 mg hr   bladder scan   d/w pt and pt partner in great detail as well given worsening renal function and if no improvement then will need hd   d/w R/B/A of HD as well   hd cath placement d/w them as well   borderline intermittent hypotension also concerning  suspect ATN from hypotension along with contrast nephropathy   all questions answered both times seen hd consent obtained witnessed and placed in chart. if no improvement then hd by am   d/w primary team and formulated plan as well   TAVR work up in progress  EP consulted for syncope, recommending device placement for bradycardia  now with fevers/hypotension, ID workup in progress  abx per ID, should be renally dosed for GFR 10 cc /min   ?contrast allergy, given new rash on face, arms, neck, and fever/hypotension after iv contrast given  derm consult   structural heart team following, TAVR work up   cont O2 and tele monitor   trend creatinine and electrolytes daily

## 2024-04-17 NOTE — PROGRESS NOTE ADULT - SUBJECTIVE AND OBJECTIVE BOX
INTERVAL HPI/OVERNIGHT EVENTS:  still very nauseous    MEDICATIONS  (STANDING):  ascorbic acid 500 milliGRAM(s) Oral daily  aspirin  chewable 81 milliGRAM(s) Oral daily  atorvastatin 80 milliGRAM(s) Oral at bedtime  buMETAnide Infusion 0.5 mG/Hr (2.5 mL/Hr) IV Continuous <Continuous>  calamine/zinc oxide Lotion 1 Application(s) Topical three times a day  cefepime   IVPB 1000 milliGRAM(s) IV Intermittent every 24 hours  dextrose 10% Bolus 125 milliLiter(s) IV Bolus once  dextrose 5%. 1000 milliLiter(s) (100 mL/Hr) IV Continuous <Continuous>  dextrose 5%. 1000 milliLiter(s) (50 mL/Hr) IV Continuous <Continuous>  dextrose 50% Injectable 12.5 Gram(s) IV Push once  dextrose 50% Injectable 25 Gram(s) IV Push once  ferrous    sulfate 325 milliGRAM(s) Oral two times a day  glucagon  Injectable 1 milliGRAM(s) IntraMuscular once  heparin   Injectable 5000 Unit(s) SubCutaneous every 8 hours  insulin glargine Injectable (LANTUS) 12 Unit(s) SubCutaneous at bedtime  insulin lispro (ADMELOG) corrective regimen sliding scale   SubCutaneous three times a day before meals  insulin lispro (ADMELOG) corrective regimen sliding scale   SubCutaneous at bedtime  lactated ringers. 500 milliLiter(s) (100 mL/Hr) IV Continuous <Continuous>  levothyroxine 75 MICROGram(s) Oral daily  loratadine Syrup 10 milliGRAM(s) Oral daily  multivitamin 1 Tablet(s) Oral daily  mupirocin 2% Nasal 1 Application(s) Both Nostrils two times a day  nystatin    Suspension [id number] Unit(s) Oral four times a day  nystatin/triamcinolone Cream 1 Application(s) Topical every 12 hours  ondansetron   Disintegrating Tablet 8 milliGRAM(s) Oral once  pantoprazole    Tablet 40 milliGRAM(s) Oral before breakfast  triamcinolone 0.1% Ointment 1 Application(s) Topical two times a day    MEDICATIONS  (PRN):  dextrose Oral Gel 15 Gram(s) Oral once PRN Blood Glucose LESS THAN 70 milliGRAM(s)/deciliter  diphenhydrAMINE Injectable 50 milliGRAM(s) IV Push every 4 hours PRN Rash and/or Itching  metoclopramide Injectable 10 milliGRAM(s) IV Push every 6 hours PRN nausea vomiting      Allergies    contrast media (iodine-based) (Fever; Vomiting; Flushing; Hypotension; Rash; Stomach Upset)  Ativan (Rash; Urticaria)  penicillins (Hives)    Intolerances      Vital Signs Last 24 Hrs  T(C): 36.9 (17 Apr 2024 12:21), Max: 36.9 (17 Apr 2024 12:21)  T(F): 98.4 (17 Apr 2024 12:21), Max: 98.4 (17 Apr 2024 12:21)  HR: 74 (17 Apr 2024 12:21) (74 - 108)  BP: 106/66 (17 Apr 2024 12:21) (89/52 - 106/66)  BP(mean): 79 (17 Apr 2024 12:21) (79 - 79)  RR: 18 (17 Apr 2024 12:21) (17 - 18)  SpO2: 96% (17 Apr 2024 12:21) (93% - 98%)    Parameters below as of 17 Apr 2024 12:21  Patient On (Oxygen Delivery Method): nasal cannula      PHYSICAL EXAM:    The patient was in NAD.  OP showed no ulcerations.  There was no visible LAD.  Conjunctiva were non-injected.  There was no clubbing or edema of extremities.  The scalp, hair, face, eyebrows, lips, OP, neck, chest, back, extremities x4, and nails were examined.  There was no hyperhidrosis or bromhidrosis.    Of note on skin exam:  pink plaques with micaceous scale on trunk and extremities  Legs with erythematous circumferential patches and edema  erythematous patches on face, neck, intertriginous areas, inferior R breast with linear pustules   white plaques under tongue    LABS:                        11.5   12.90 )-----------( 159      ( 17 Apr 2024 11:00 )             37.0     04-17    133<L>  |  91<L>  |  77<H>  ----------------------------<  121<H>  5.0   |  30  |  3.62<H>    Ca    8.6      17 Apr 2024 11:00  Phos  3.6     04-17  Mg     2.1     04-17    TPro  6.3  /  Alb  2.9<L>  /  TBili  1.1  /  DBili  x   /  AST  14  /  ALT  7<L>  /  AlkPhos  47  04-17      Urinalysis Basic - ( 17 Apr 2024 11:00 )    Color: x / Appearance: x / SG: x / pH: x  Gluc: 121 mg/dL / Ketone: x  / Bili: x / Urobili: x   Blood: x / Protein: x / Nitrite: x   Leuk Esterase: x / RBC: x / WBC x   Sq Epi: x / Non Sq Epi: x / Bacteria: x        RADIOLOGY & ADDITIONAL TESTS: reviewed; no pertinent findings INTERVAL HPI/OVERNIGHT EVENTS:  still very nauseous    MEDICATIONS  (STANDING):  ascorbic acid 500 milliGRAM(s) Oral daily  aspirin  chewable 81 milliGRAM(s) Oral daily  atorvastatin 80 milliGRAM(s) Oral at bedtime  buMETAnide Infusion 0.5 mG/Hr (2.5 mL/Hr) IV Continuous <Continuous>  calamine/zinc oxide Lotion 1 Application(s) Topical three times a day  cefepime   IVPB 1000 milliGRAM(s) IV Intermittent every 24 hours  dextrose 10% Bolus 125 milliLiter(s) IV Bolus once  dextrose 5%. 1000 milliLiter(s) (100 mL/Hr) IV Continuous <Continuous>  dextrose 5%. 1000 milliLiter(s) (50 mL/Hr) IV Continuous <Continuous>  dextrose 50% Injectable 12.5 Gram(s) IV Push once  dextrose 50% Injectable 25 Gram(s) IV Push once  ferrous    sulfate 325 milliGRAM(s) Oral two times a day  glucagon  Injectable 1 milliGRAM(s) IntraMuscular once  heparin   Injectable 5000 Unit(s) SubCutaneous every 8 hours  insulin glargine Injectable (LANTUS) 12 Unit(s) SubCutaneous at bedtime  insulin lispro (ADMELOG) corrective regimen sliding scale   SubCutaneous three times a day before meals  insulin lispro (ADMELOG) corrective regimen sliding scale   SubCutaneous at bedtime  lactated ringers. 500 milliLiter(s) (100 mL/Hr) IV Continuous <Continuous>  levothyroxine 75 MICROGram(s) Oral daily  loratadine Syrup 10 milliGRAM(s) Oral daily  multivitamin 1 Tablet(s) Oral daily  mupirocin 2% Nasal 1 Application(s) Both Nostrils two times a day  nystatin    Suspension [id number] Unit(s) Oral four times a day  nystatin/triamcinolone Cream 1 Application(s) Topical every 12 hours  ondansetron   Disintegrating Tablet 8 milliGRAM(s) Oral once  pantoprazole    Tablet 40 milliGRAM(s) Oral before breakfast  triamcinolone 0.1% Ointment 1 Application(s) Topical two times a day    MEDICATIONS  (PRN):  dextrose Oral Gel 15 Gram(s) Oral once PRN Blood Glucose LESS THAN 70 milliGRAM(s)/deciliter  diphenhydrAMINE Injectable 50 milliGRAM(s) IV Push every 4 hours PRN Rash and/or Itching  metoclopramide Injectable 10 milliGRAM(s) IV Push every 6 hours PRN nausea vomiting      Allergies    contrast media (iodine-based) (Fever; Vomiting; Flushing; Hypotension; Rash; Stomach Upset)  Ativan (Rash; Urticaria)  penicillins (Hives)    Intolerances      Vital Signs Last 24 Hrs  T(C): 36.9 (17 Apr 2024 12:21), Max: 36.9 (17 Apr 2024 12:21)  T(F): 98.4 (17 Apr 2024 12:21), Max: 98.4 (17 Apr 2024 12:21)  HR: 74 (17 Apr 2024 12:21) (74 - 108)  BP: 106/66 (17 Apr 2024 12:21) (89/52 - 106/66)  BP(mean): 79 (17 Apr 2024 12:21) (79 - 79)  RR: 18 (17 Apr 2024 12:21) (17 - 18)  SpO2: 96% (17 Apr 2024 12:21) (93% - 98%)    Parameters below as of 17 Apr 2024 12:21  Patient On (Oxygen Delivery Method): nasal cannula      PHYSICAL EXAM:    The patient was in NAD.  OP showed no ulcerations.  There was no visible LAD.  Conjunctiva were non-injected.  There was no clubbing or edema of extremities.  The scalp, hair, face, eyebrows, lips, OP, neck, chest, back, extremities x4, and nails were examined.  There was no hyperhidrosis or bromhidrosis.    Of note on skin exam:  pink plaques with micaceous scale on trunk and extremities  Legs with erythematous circumferential patches and edema  erythematous confluent patches on face, neck, intertriginous areas  white plaques under tongue    LABS:                        11.5   12.90 )-----------( 159      ( 17 Apr 2024 11:00 )             37.0     04-17    133<L>  |  91<L>  |  77<H>  ----------------------------<  121<H>  5.0   |  30  |  3.62<H>    Ca    8.6      17 Apr 2024 11:00  Phos  3.6     04-17  Mg     2.1     04-17    TPro  6.3  /  Alb  2.9<L>  /  TBili  1.1  /  DBili  x   /  AST  14  /  ALT  7<L>  /  AlkPhos  47  04-17      Urinalysis Basic - ( 17 Apr 2024 11:00 )    Color: x / Appearance: x / SG: x / pH: x  Gluc: 121 mg/dL / Ketone: x  / Bili: x / Urobili: x   Blood: x / Protein: x / Nitrite: x   Leuk Esterase: x / RBC: x / WBC x   Sq Epi: x / Non Sq Epi: x / Bacteria: x        RADIOLOGY & ADDITIONAL TESTS: reviewed; no pertinent findings

## 2024-04-17 NOTE — PROGRESS NOTE ADULT - ATTENDING COMMENTS
Red rash spreading, unlikely to be type I mediated from contrast. Favor an early drug eruption (?agep from abx vs contrast) vs delay hypersensitivity (unclear culprit in this case, will need to review home meds). While certain drug eruptions such as agep may cause fever and neutrophilia, would rule out infectious causes as her abdominal sxs of n/v are not in line with her rash. Renal function continues top worsen. Hypersensitivity related DUNCAN less favored, but can check the urine for AIN. Biopsy performed from erythema today to further elucidate. Treat rash with topicals for now and c/w infectious workup (?gallbladder).

## 2024-04-17 NOTE — PROGRESS NOTE ADULT - PROBLEM SELECTOR PLAN 4
TTE (4/2024) LVEF 30% w mod LVH, mildly reduced RV, AS (0.61 cm2), mod pHTN.   On home Torsemide 40 mg daily, Toprol  mg daily, Farxiga 5mg daily. Follows w Dr. Cherise rosas eventual plan to start further GDMT as renally tolerated  proBNP 51K from 80k.     - strict I/Os, daily weights, fluid restrict 1.5L/day  - dc'd home torsemide 40mg daily  - Started bumex gtt 0.5mg/hr, held iso sepsis TTE (4/2024) LVEF 30% w mod LVH, mildly reduced RV, AS (0.61 cm2), mod pHTN.   On home Torsemide 40 mg daily, Toprol  mg daily, Farxiga 5mg daily. Follows w Dr. Cherise rosas eventual plan to start further GDMT as renally tolerated  proBNP 51K from 80k.     - strict I/Os, daily weights, fluid restrict 1.5L/day  - dc'd home torsemide 40mg daily  - Restarted bumex gtt, pt is now anuric

## 2024-04-17 NOTE — PROGRESS NOTE ADULT - PROBLEM SELECTOR PLAN 10
Hospital Bundle    Fluids: FLUID RESTRICT 1.5L/day  Electrolytes: Replete K > 4, Mg > 2, Phos > 3  Nutrition: Diet DASH CC 1.5L FLUID RESTRICT  PPX  ---VTE: HSQ  ---GI: c/w PPI  ---Resp: c/w home O2  Access: PIV  Code Status: pending further discussions  Dispo: pending clinical improvement Hospital Bundle    Fluids: FLUID RESTRICT 1.5L/day  Electrolytes: Replete K > 4, Mg > 2, Phos > 3  Nutrition: Diet: Clear Liquid 1.5L FLUID RESTRICT iso nausea  PPX  ---VTE: HSQ  ---GI: c/w PPI  ---Resp: c/w home O2  Access: PIV  Code Status: pending further discussions  Dispo: pending clinical improvement

## 2024-04-17 NOTE — DISCHARGE NOTE PROVIDER - NSDCFUSCHEDAPPT_GEN_ALL_CORE_FT
[facility]  ELECTROPH [address]  Scheduled Appointment: 05/02/2024     [facility]  ELECTROPH [address]  Scheduled Appointment: 05/08/2024

## 2024-04-17 NOTE — PROGRESS NOTE ADULT - PROBLEM SELECTOR PLAN 3
mod to severe AS    Plan:  -structural cards following - CTA imagings performed for TAVR  -will need PPM prior to TAVR per EP due to conduction disease, however, patient is poor surgical candidate.

## 2024-04-17 NOTE — PROGRESS NOTE ADULT - ATTENDING COMMENTS
73F PMH HFrEF (LVEF 30%), moderate AS, HTN, DM1.5, CKD3, hypothyroidism, chronic lymphadema, presents with episode of syncope. Notably, patient was recently discharged from [facility] about a week prior to admission. On day of admission, patient was with episode of shortness of breath, then sat down in car had had witnessed syncopal episode which lasted 10-15 seconds. No postictal state or incontinence.     #allergic reaction- pt with ongoing skin flushing and n/v, minimal relief with benadryl. Unclear if delayed contrast allergic reaction vs. infection vs. medication adverse effect  - blood cultures negative, urine pending  - continue cefepime renally dosed and vanco by level for now  - given pt slated for TAVR, would consult ID and allergy for their input on if steroids are contraindicated/indicated  - cont benadryl 50mg iv q6 hours prn, topical triamcinolone helping    #Syncope  - CT head noncon  - Carotid ultrasound   - Structural cardiology consult given results of most recent echo, cannot rule out AS  - Orthostatics   - Continue to monitor on tele, may warrant Zio patch as patient previously had halter, however no events recorded  - d/w structural, nephrology and patient- given reasonable risk to kidneys with contrast and possible TAVR; pt amenable to possibly needing HD  - will proceed with TAVR workup, anticipate pt will need HD  - derm consulted; skin rash not warranting urgent treatment and not a contraindication to TAVR  - pt/partner would like to proceed with TAVR    #Acute CHF  - Strict IO, daily standing weights  - resume bumex with albumin per renal  - monitor Cr closely. check UA, bladder scan    #DUNCAN on CKD- worsening now, will resume albumin/bumex per renal, pt consented for HD, will f/u with renal on when to place shiley.    #Hyperglycemia, DMT2- pt now with episodes of hypoglycemia, likely 2/2 poor PO intake and ongoing insulin dosing, will reduce insulin and continue monitoring    plan d/w and agreed on by pt and partner at bedside    The necessity of the time spent during the encounter on this date of service was due to:   - Ordering, reviewing, and interpreting labs, testing, and imaging.  - Independently obtaining a review of systems and performing a physical exam  - Reviewing prior hospitalization and where necessary, outpatient records.  - Counselling and educating patient and family regarding interpretation of aforementioned items and plan of care.    Time-based billing (NON-critical care). Total minutes spent: 57

## 2024-04-17 NOTE — DISCHARGE NOTE PROVIDER - HOSPITAL COURSE
73F w HFrEF, severe AS, known LBBB, HTN, IDDM1, CKD, hypothyroidism, chronic lymphedema, p/w 1 episode of syncope with R arm jerking, likely 2/2 severe symptomatic AS. CTA imagings performed for TAVR eval, c/b fever and hypotension, likely contrast related allergic reaction vs sepsis. Infectious workup showed ____.  Course further c/b DUNCAN on CKD, requiring HD?       Meds started:    To followup:  -structural cardiology  -EP   73F w HFrEF, severe AS, known LBBB, HTN, IDDM1, CKD, hypothyroidism, chronic lymphedema, p/w 1 episode of syncope with R arm jerking, likely 2/2 severe symptomatic AS. CTA imagings performed for TAVR eval, c/b fever and hypotension, likely contrast related allergic reaction.  Course further c/b DUNCAN on CKD, requiring HD. Plan for AV micra placement prior to undergoing TAVR.       Meds started:    To followup:  -structural cardiology  -EP HPI:  73F w HFrEF (EF 30%), mod AS, known LBBB, HTN, IDDM1, CKD, hypothyroidism, chronic lymphedema, suspected OHS/LELIA on 3L NC home O2 p/w 1 episode of syncope. Recent admission at [facility] (3/29-4/5) for ADHF and DUNCAN s/p diuresis, discharged with new home O2 3L NC suspecting OHS/LELIA pending outpatient w/u. Since discharge a week ago, she has been staying at home with   Pt had sob walking to car. Once in car, she was still breathing heavily. Partner noticed pt was not responding to verbal stimuli for 10-15sec and witnessed R arm jerking. Pt gained consciousness quickly without postictal symptoms. Pt went to Cardiologist Dr. Nathan who recommended pt to come to ED. No fever, cp, abd pain, n/v. Leg swelling is improved from prior. Pt on torsemide 40mg which she has been taking. Pt was discharged on 3LNC which she is currently on. Patient reports she did not take Farxiga that she was discharged on as she was concerned about side effects.     In ED, patient was found afebrile, hemodynamically stable, breathing well on 3L NC. Initial labs notable for mild hyponatremia, DUNCAN on CKD, elevated proBNP and elevated pCO2 both improved from prior. (12 Apr 2024 16:31)    Hospital Course:   Patient was admitted for syncope secondary to severe AS. TAVR eval complicated by allergic desquamating skin reaction from IV contrast she received for imaging studies. Now s/p TAVR procedure then complicated by VF/VT requiring shock and then flash pulmonary edema requiring intubation/mechanical ventilation. She also developed wosening renal function requiring intermiitent HD and CRRT w/ pressor support. She eventually was extubated, weaned off pressors and deemed stable for downgrade to medicine floors.   Hospital course on the medicine floors complicated by UTI w/ urine culture growing vancomycin-resistant enterococcus. A TOV was took place while on the floors but hardy reinserted after patient was found with significant urinary retention.     She was followed by nephrology, EP, cardiology and endocrinology during her hospitalization.     On day of discharge, patient is clinically stable with no new exam findings or acute symptoms compared to prior. The patient was seen by the attending physician on the date of discharge and deemed stable and acceptable for discharge. The patient's chronic medical conditions were treated accordingly per the patient's home medication regimen. The patient's medication reconciliation, follow up appointments, discharge instructions, and significant lab and diagnostic studies are as noted.     Important Medication Changes and Reason:    Active or Pending Issues Requiring Follow-up:  - f/u with cards/EP for discussion of CRT device   - f/u with endocrinology on T1DM management  - f/u with nephrology      Advanced Directives:   [ ] Full code  [ ] DNR  [ ] Hospice    Discharge Diagnoses:        73F w HFrEF, severe AS, known LBBB, HTN, IDDM1, CKD, hypothyroidism, chronic lymphedema, p/w 1 episode of syncope with R arm jerking, likely 2/2 severe symptomatic AS. CTA imagings performed for TAVR eval, c/b fever and hypotension, likely contrast related allergic reaction.  Course further c/b DUNCAN on CKD, requiring HD. Plan for AV micra placement prior to undergoing TAVR.       Meds started:    To followup:  -structural cardiology  -EP HPI:  73F w HFrEF (EF 30%), mod AS, known LBBB, HTN, IDDM1, CKD, hypothyroidism, chronic lymphedema, suspected OHS/LELIA on 3L NC home O2 p/w 1 episode of syncope. Recent admission at [facility] (3/29-4/5) for ADHF and DUNCAN s/p diuresis, discharged with new home O2 3L NC suspecting OHS/LELIA pending outpatient w/u. Since discharge a week ago, she has been staying at home with   Pt had sob walking to car. Once in car, she was still breathing heavily. Partner noticed pt was not responding to verbal stimuli for 10-15sec and witnessed R arm jerking. Pt gained consciousness quickly without postictal symptoms. Pt went to Cardiologist Dr. Nathan who recommended pt to come to ED. No fever, cp, abd pain, n/v. Leg swelling is improved from prior. Pt on torsemide 40mg which she has been taking. Pt was discharged on 3LNC which she is currently on. Patient reports she did not take Farxiga that she was discharged on as she was concerned about side effects.     In ED, patient was found afebrile, hemodynamically stable, breathing well on 3L NC. Initial labs notable for mild hyponatremia, DUNCAN on CKD, elevated proBNP and elevated pCO2 both improved from prior. (12 Apr 2024 16:31)    Hospital Course:   Patient was admitted for syncope secondary to severe AS. TAVR eval complicated by allergic desquamating skin reaction from IV contrast she received for imaging studies. Now s/p TAVR procedure then complicated by VF/VT requiring shock and then flash pulmonary edema requiring intubation/mechanical ventilation. She also developed wosening renal function requiring intermiitent HD and CRRT w/ pressor support. She eventually was extubated, weaned off pressors and deemed stable for downgrade to medicine floors.   Hospital course on the medicine floors complicated by UTI w/ urine culture growing vancomycin-resistant enterococcus. A TOV was took place while on the floors but hardy reinserted after patient was found with significant urinary retention.     She was followed by nephrology, EP, cardiology and endocrinology during her hospitalization.     *********If patient is discharged with a Hardy, she is prone to getting bad UTI's, so rehab should be prepared***********      On day of discharge, patient is clinically stable with no new exam findings or acute symptoms compared to prior. The patient was seen by the attending physician on the date of discharge and deemed stable and acceptable for discharge. The patient's chronic medical conditions were treated accordingly per the patient's home medication regimen. The patient's medication reconciliation, follow up appointments, discharge instructions, and significant lab and diagnostic studies are as noted.     Important Medication Changes and Reason:    Active or Pending Issues Requiring Follow-up:  - f/u with cards/EP for discussion of CRT device   - f/u with endocrinology on T1DM management  - f/u with nephrology      Advanced Directives:   [ ] Full code  [ ] DNR  [ ] Hospice    Discharge Diagnoses:        73F w HFrEF, severe AS, known LBBB, HTN, IDDM1, CKD, hypothyroidism, chronic lymphedema, p/w 1 episode of syncope with R arm jerking, likely 2/2 severe symptomatic AS. CTA imagings performed for TAVR eval, c/b fever and hypotension, likely contrast related allergic reaction.  Course further c/b DUNCAN on CKD, requiring HD. Plan for AV micra placement prior to undergoing TAVR.       Meds started:    To followup:  -structural cardiology  -EP HPI:  73F w HFrEF (EF 30%), mod AS, known LBBB, HTN, IDDM1, CKD, hypothyroidism, chronic lymphedema, suspected OHS/LELIA on 3L NC home O2 p/w 1 episode of syncope. Recent admission at [facility] (3/29-4/5) for ADHF and DUNCAN s/p diuresis, discharged with new home O2 3L NC suspecting OHS/LELIA pending outpatient w/u. Since discharge a week ago, she has been staying at home with   Pt had sob walking to car. Once in car, she was still breathing heavily. Partner noticed pt was not responding to verbal stimuli for 10-15sec and witnessed R arm jerking. Pt gained consciousness quickly without postictal symptoms. Pt went to Cardiologist Dr. Nathan who recommended pt to come to ED. No fever, cp, abd pain, n/v. Leg swelling is improved from prior. Pt on torsemide 40mg which she has been taking. Pt was discharged on 3LNC which she is currently on. Patient reports she did not take Farxiga that she was discharged on as she was concerned about side effects.     In ED, patient was found afebrile, hemodynamically stable, breathing well on 3L NC. Initial labs notable for mild hyponatremia, DUNCAN on CKD, elevated proBNP and elevated pCO2 both improved from prior. (12 Apr 2024 16:31)    Hospital Course:   Patient was admitted for syncope secondary to severe AS. TAVR eval complicated by allergic desquamating skin reaction from IV contrast she received for imaging studies. Now s/p TAVR procedure then complicated by VF/VT requiring shock and then flash pulmonary edema requiring intubation/mechanical ventilation. She also developed wosening renal function requiring intermiitent HD and CRRT w/ pressor support. She eventually was extubated, weaned off pressors and deemed stable for downgrade to medicine floors.  TAVR procedure also accompanied by rash (Acute Generalized Exanthematus Pustulosis) secondary to contrast.    Hospital course on the medicine floors complicated by UTI w/ urine culture growing vancomycin-resistant enterococcus. A TOV was took place while on the floors but hardy reinserted after patient was found with significant urinary retention.     *********If patient is discharged with a Hardy, she is prone to getting bad UTI's, so rehab should be prepared***********    Hospital course further complicated by persistent orthostatic hypotension.  Patient required increasing doses of midodrine, droxydopa, and fludrocortisone with ********improvement??**********.    Developed DUNCAN which greatly improved with fluids.    Went into a brief diabetic ketoacidosis.  Difficult managing blood sugars in hospital.    Course c/b FRAN DVT which was treated with therapeutic Eliquis.  Brief GI Bleed occurred, gastroenterology was consulted, Eliquis was continued.    She was followed by nephrology, EP, cardiology, gastroenterology, and endocrinology during her hospitalization.         On day of discharge, patient is clinically stable with no new exam findings or acute symptoms compared to prior. The patient was seen by the attending physician on the date of discharge and deemed stable and acceptable for discharge. The patient's chronic medical conditions were treated accordingly per the patient's home medication regimen. The patient's medication reconciliation, follow up appointments, discharge instructions, and significant lab and diagnostic studies are as noted.     Important Medication Changes and Reason:    Active or Pending Issues Requiring Follow-up:  - f/u with cards/EP for discussion of CRT device   - f/u with endocrinology on T1DM management  - f/u with nephrology      Advanced Directives:   [ ] Full code  [ ] DNR  [ ] Hospice    Discharge Diagnoses:          Meds started:    Eliquis 5mg BID x 90 days  Midodrine  Droxydopa  Fludrocortisone    To followup:  -structural cardiology  -EP HPI:  73F w HFrEF (EF 30%), mod AS, known LBBB, HTN, IDDM1, CKD, hypothyroidism, chronic lymphedema, suspected OHS/LELIA on 3L NC home O2 p/w 1 episode of syncope. Recent admission at [facility] (3/29-4/5) for ADHF and DUNCAN s/p diuresis, discharged with new home O2 3L NC suspecting OHS/LELIA pending outpatient w/u. Since discharge a week ago, she has been staying at home with   Pt had sob walking to car. Once in car, she was still breathing heavily. Partner noticed pt was not responding to verbal stimuli for 10-15sec and witnessed R arm jerking. Pt gained consciousness quickly without postictal symptoms. Pt went to Cardiologist Dr. Nathan who recommended pt to come to ED. No fever, cp, abd pain, n/v. Leg swelling is improved from prior. Pt on torsemide 40mg which she has been taking. Pt was discharged on 3LNC which she is currently on. Patient reports she did not take Farxiga that she was discharged on as she was concerned about side effects.     In ED, patient was found afebrile, hemodynamically stable, breathing well on 3L NC. Initial labs notable for mild hyponatremia, DUNCAN on CKD, elevated proBNP and elevated pCO2 both improved from prior. (12 Apr 2024 16:31)    Hospital Course:   Patient was admitted for syncope secondary to severe AS. TAVR eval complicated by allergic desquamating skin reaction from IV contrast she received for imaging studies. Now s/p TAVR procedure then complicated by VF/VT requiring shock and then flash pulmonary edema requiring intubation/mechanical ventilation. She also developed wosening renal function requiring intermiitent HD and CRRT w/ pressor support. She eventually was extubated, wean	ed off pressors and deemed stable for downgrade to medicine floors.  TAVR procedure also accompanied by rash (Acute Generalized Exanthematus Pustulosis) secondary to contrast.    Hospital course on the medicine floors complicated by UTI w/ urine culture growing vancomycin-resistant enterococcus. A TOV was took place while on the floors but hardy reinserted after patient was found with significant urinary retention.     *********If patient is discharged with a Hardy, she is prone to getting bad UTI's, so rehab should be prepared***********    Hospital course further complicated by persistent orthostatic hypotension.  Patient required increasing doses of midodrine, droxydopa, and fludrocortisone with ********improvement??**********.    Developed DUNCAN which greatly improved with fluids.    Went into a brief diabetic ketoacidosis.  Difficult managing blood sugars in hospital.    Course c/b FRAN DVT which was treated with therapeutic Eliquis.  Brief GI Bleed occurred, gastroenterology was consulted, Eliquis was continued.    She was followed by nephrology, EP, cardiology, gastroenterology, and endocrinology during her hospitalization.         On day of discharge, patient is clinically stable with no new exam findings or acute symptoms compared to prior. The patient was seen by the attending physician on the date of discharge and deemed stable and acceptable for discharge. The patient's chronic medical conditions were treated accordingly per the patient's home medication regimen. The patient's medication reconciliation, follow up appointments, discharge instructions, and significant lab and diagnostic studies are as noted.     Important Medication Changes and Reason:    Active or Pending Issues Requiring Follow-up:  - f/u with cards/EP for discussion of CRT device   - f/u with endocrinology on T1DM management  - f/u with nephrology      Advanced Directives:   [ ] Full code  [ ] DNR  [ ] Hospice    Discharge Diagnoses:          Meds started:    Eliquis 5mg BID x 90 days  Midodrine  Droxydopa  Fludrocortisone    To followup:  -structural cardiology  -EP HPI:  73F w HFrEF (EF 30%), mod AS, known LBBB, HTN, IDDM1, CKD, hypothyroidism, chronic lymphedema, suspected OHS/LELIA on 3L NC home O2 p/w 1 episode of syncope. Recent admission at [facility] (3/29-4/5) for ADHF and DUNCAN s/p diuresis, discharged with new home O2 3L NC suspecting OHS/LELIA pending outpatient w/u. Pt had sob walking to car. Once in car, she was still breathing heavily. Partner noticed pt was not responding to verbal stimuli for 10-15sec and witnessed R arm jerking. Pt gained consciousness quickly without postictal symptoms. Pt went to Cardiologist Dr. Nathan who recommended pt to come to ED. In ED, patient was found afebrile, hemodynamically stable, breathing well on 3L NC. Initial labs notable for mild hyponatremia, DUNCAN on CKD, elevated proBNP and elevated pCO2 both improved from prior.     Hospital Course:   Patient was admitted for syncope secondary to severe AS. TAVR eval complicated by allergic desquamating skin reaction from IV contrast she received for imaging studies. Now s/p TAVR procedure then complicated by VF/VT requiring shock and then flash pulmonary edema requiring intubation/mechanical ventilation. She also developed worsening renal function requiring intermittent HD and CRRT w/ pressor support. She eventually was extubated, wean	ed off pressors and deemed stable for downgrade to medicine floors.  TAVR procedure also accompanied by rash (Acute Generalized Exanthematus Pustulosis) secondary to contrast.    Hospital course on the medicine floors complicated by UTI x2 w/ urine culture growing vancomycin-resistant enterococcus. A TOV was took place while on the floors but hardy reinserted after patient was found with significant urinary retention. Developed DUNCAN which greatly improved with fluids. Course c/b FRAN DVT which was treated with therapeutic Eliquis. Had single episode melena, gastroenterology was consulted, Eliquis was continued. Hospital course further complicated by persistent orthostatic hypotension.  Patient required increasing doses of midodrine, droxydopa, fludrocortisone, and eventually pyridostigmine with ********improvement??**********. She was discharged to acute inpatient rehab and should follow up with ***   HPI:  73F w HFrEF (EF 30%), mod AS, known LBBB, HTN, IDDM1, CKD, hypothyroidism, chronic lymphedema, suspected OHS/LELIA on 3L NC home O2 p/w 1 episode of syncope. Recent admission at [facility] (3/29-4/5) for ADHF and DUNCAN s/p diuresis, discharged with new home O2 3L NC suspecting OHS/LELIA pending outpatient w/u. Pt had sob walking to car. Once in car, she was still breathing heavily. Partner noticed pt was not responding to verbal stimuli for 10-15sec and witnessed R arm jerking. Pt gained consciousness quickly without postictal symptoms. Pt went to Cardiologist Dr. Nathan who recommended pt to come to ED. In ED, patient was found afebrile, hemodynamically stable, breathing well on 3L NC. Initial labs notable for mild hyponatremia, DUNCAN on CKD, elevated proBNP and elevated pCO2 both improved from prior.     Hospital Course:   Patient was admitted for syncope secondary to severe AS. TAVR eval complicated by allergic desquamating skin reaction from IV contrast she received for imaging studies. Now s/p TAVR procedure then complicated by VF/VT requiring shock and then flash pulmonary edema requiring intubation/mechanical ventilation. She also developed worsening renal function requiring intermittent HD and CRRT w/ pressor support. She eventually was extubated, wean	ed off pressors and deemed stable for downgrade to medicine floors.  TAVR procedure also accompanied by rash (Acute Generalized Exanthematus Pustulosis) secondary to contrast.    Hospital course on the medicine floors complicated by UTI x2 w/ urine culture growing vancomycin-resistant enterococcus. A TOV was took place while on the floors but hardy reinserted after patient was found with significant urinary retention. Developed DUNCAN which greatly improved with fluids. Course c/b FRAN DVT which was treated with therapeutic Eliquis. Had single episode melena, gastroenterology was consulted, Eliquis was continued. Hospital course further complicated by persistent orthostatic hypotension.  Patient required increasing doses of midodrine, droxydopa, fludrocortisone, and eventually pyridostigmine with improvement. She was discharged to acute inpatient rehab and should follow up with primary care, endocrine, and cardiology.     FOLLOW-UP  - wean midodrine, droxydopa, pyridostigmine   73F w HFrEF (EF 30%), mod AS, known LBBB, HTN, IDDM1, CKD, hypothyroidism, chronic lymphedema, suspected OHS/LELIA on 3L NC home O2 p/w 1 episode of syncope. Recent admission at [facility] (3/29-4/5) for ADHF and DUNCAN s/p diuresis, discharged with new home O2 3L NC suspecting OHS/LELIA pending outpatient w/u. Pt had sob walking to car. Once in car, she was still breathing heavily. Partner noticed pt was not responding to verbal stimuli for 10-15sec and witnessed R arm jerking. Pt gained consciousness quickly without postictal symptoms. Pt went to Cardiologist Dr. Nathan who recommended pt to come to ED. In ED, patient was found afebrile, hemodynamically stable, breathing well on 3L NC. Initial labs notable for mild hyponatremia, DUNCAN on CKD, elevated proBNP and elevated pCO2 both improved from prior.     Hospital Course:       Patient was admitted for syncope secondary to severe AS. TAVR eval complicated by allergic desquamating skin reaction from IV contrast she received for imaging studies.     Underwent TAVR procedure then complicated by VF/VT requiring shock and then flash pulmonary edema requiring intubation/mechanical ventilation. She also developed worsening renal function requiring intermittent HD and CRRT w/ pressor support. She eventually was extubated, weaned off pressors and deemed stable for downgrade to medicine floors. TAVR procedure also accompanied by rash (Acute Generalized Exanthematus Pustulosis) secondary to contrast.    Hospital course on the medicine floors complicated by UTI x2 w/ urine culture growing vancomycin-resistant enterococcus. A TOV was took place while on the floors but hardy reinserted after patient was found with significant urinary retention. Developed DUNCAN which greatly improved with fluids. Failed 2 more voiding trials.     Course c/b FRAN DVT which was treated with therapeutic Eliquis. Had single episode melena, gastroenterology was consulted, Eliquis was continued.     Course further complicated by persistent severe orthostatic hypotension. Patient required increasing doses of midodrine, droxidopa, fludrocortisone, and eventually pyridostigmine with gradual improvement. Also given aggressive hydration.     Still orthostatic but significantly better symptomatically and able to stand up. Fludrocortisone weaned off per Nephrology input as caused worsening edema and did not help orthostasis.     Initially hyperglycemic requiring large doses if insulin, subsequently improved and had intermittent hypoglycemia- insulin reduced.     She was discharged to acute inpatient rehab and should follow up with primary care, endocrine, and cardiology.     FOLLOW-UP    - wean midodrine, droxydopa, pyridostigmine- though may be difficult given very severe orthostasis.     - repeat voiding trial once more ambulatory

## 2024-04-17 NOTE — PROCEDURE NOTE - ADDITIONAL PROCEDURE DETAILS
called to place difficulty hardy for strict i&os.   urethral meatus retracted and unable to visualize.  area prepped and draped in traditional sterile fashion. a 14f coude was placed with return of yellow urine. 10cc sterile water in balloon. secured with paper tape given excoriations on BLE.  plan for hardy per primary team.

## 2024-04-17 NOTE — DISCHARGE NOTE PROVIDER - PROVIDER TOKENS
PROVIDER:[TOKEN:[[id number]],FOLLOWUP:[1 month],ESTABLISHEDPATIENT:[T]],PROVIDER:[TOKEN:[[id number]],FOLLOWUP:[2 weeks]] PROVIDER:[TOKEN:[[id number]],FOLLOWUP:[1 month],ESTABLISHEDPATIENT:[T]],PROVIDER:[TOKEN:[[id number]],FOLLOWUP:[2 weeks]],PROVIDER:[TOKEN:[[id number]]]

## 2024-04-17 NOTE — DISCHARGE NOTE PROVIDER - NSDCCPTREATMENT_GEN_ALL_CORE_FT
PRINCIPAL PROCEDURE  Procedure: CTA coronary arteries  Findings and Treatment: IMPRESSION:  Suboptimal image quality.  Severely calcified (Agatston calcium score 2453) suspected bicuspid   aortic valve with a calcified raphe between the left and the right   coronary cusps.  Aortic and peripheral access vessel measurements as reported above.   Accuracy of reported measurements may be decreased due to suboptimal   image quality.        SECONDARY PROCEDURE  Procedure: CTA chest w/w/o contrast  Findings and Treatment:   IMPRESSION:  CHEST:  1.  Bilateral pleural effusions.  2.  Bilateral lower lobe passive atelectasis.  3.  Narrowing of the right subclavian vein at the thoracic inlet.  ABDOMEN AND PELVIS:  1.  No bowel obstruction.       PRINCIPAL PROCEDURE  Procedure: CTA coronary arteries  Findings and Treatment: IMPRESSION:  Suboptimal image quality.  Severely calcified (Agatston calcium score 2453) suspected bicuspid   aortic valve with a calcified raphe between the left and the right   coronary cusps.  Aortic and peripheral access vessel measurements as reported above.   Accuracy of reported measurements may be decreased due to suboptimal   image quality.        SECONDARY PROCEDURE  Procedure: CTA chest w/w/o contrast  Findings and Treatment:   IMPRESSION:  CHEST:  1.  Bilateral pleural effusions.  2.  Bilateral lower lobe passive atelectasis.  3.  Narrowing of the right subclavian vein at the thoracic inlet.  ABDOMEN AND PELVIS:  1.  No bowel obstruction.      Procedure: Transthoracic echo  Findings and Treatment: 1. Left ventricular cavity is moderately dilated. Left ventricular systolic function is severely decreased with an ejection fraction of 25 % by Nunes's method of disks. Global left ventricular hypokinesis.   2. There is no evidence of a left ventricular thrombus.   3. There is moderate (grade 2) left ventricular diastolic dysfunction, withelevated filling pressure.   4. There is calcification of the mitral valve annulus.   5. A Benitez Bakari (TAVR) valve replacement is present in the aortic position The prosthetic valve is well seated. No paravalvular regurgitation.   6. Moderately enlarged right ventricular cavity size, with normal wall thickness, and normal systolic function.   7. The right atrium is moderately dilated.   8. Left pleural effusion noted.   9. No pericardial effusion seen.     PRINCIPAL PROCEDURE  Procedure: CTA coronary arteries  Findings and Treatment: IMPRESSION:  Suboptimal image quality.  Severely calcified (Agatston calcium score 2453) suspected bicuspid   aortic valve with a calcified raphe between the left and the right   coronary cusps.  Aortic and peripheral access vessel measurements as reported above.   Accuracy of reported measurements may be decreased due to suboptimal   image quality.        SECONDARY PROCEDURE  Procedure: CTA chest w/w/o contrast  Findings and Treatment:   IMPRESSION:  CHEST:  1.  Bilateral pleural effusions.  2.  Bilateral lower lobe passive atelectasis.  3.  Narrowing of the right subclavian vein at the thoracic inlet.  ABDOMEN AND PELVIS:  1.  No bowel obstruction.      Procedure: Transthoracic echo  Findings and Treatment: 1. Left ventricular cavity is moderately dilated. Left ventricular systolic function is severely decreased with an ejection fraction of 25 % by Nunes's method of disks. Global left ventricular hypokinesis.   2. There is no evidence of a left ventricular thrombus.   3. There is moderate (grade 2) left ventricular diastolic dysfunction, withelevated filling pressure.   4. There is calcification of the mitral valve annulus.   5. A Benitez Bakari (TAVR) valve replacement is present in the aortic position The prosthetic valve is well seated. No paravalvular regurgitation.   6. Moderately enlarged right ventricular cavity size, with normal wall thickness, and normal systolic function.   7. The right atrium is moderately dilated.   8. Left pleural effusion noted.   9. No pericardial effusion seen.    Procedure: Duplex scan of veins of left upper extremity  Findings and Treatment: FINDINGS:  RIGHT:  The right internal jugular, subclavian, axillary and brachial veins are   patent and compressible where applicable.  The basilic vein (superficial   vein) is patent and without thrombus.  The cephalic vein (superficial   vein) is patent and without thrombus.  LEFT:  Acute thrombus is present within the subclavian vein, axillary vein, and   brachial vein. The left internal jugular is patent and compressible where   applicable.  The basilic vein (superficial vein) is patent and without   thrombus.  The cephalic vein (superficial vein) is patent and without   thrombus.  IMPRESSION:  Acute thrombus is present within the left subclavian vein, axillary vein,   and brachial vein.      Procedure: CT abdomen pelvis  Findings and Treatment: CONTRAST/COMPLICATIONS:  IV Contrast: NONE  Oral Contrast: NONE  Complications: None reported at time of study completion  PROCEDURE:  CT of the Abdomen and Pelvis was performed.  Sagittal and coronal reformats were performed.  FINDINGS:  LOWER CHEST: TAVR. Wireless cardiac pacemaker. Hypoattenuation of the   intracardiac blood pool, suggesting anemia. Left lower lobe partial   atelectasis.  LIVER: Within normal limits.  BILE DUCTS: Normal caliber.  GALLBLADDER: Contracted  SPLEEN: Within normal limits.  PANCREAS: Within normal limits.  ADRENALS: Within normal limits.  KIDNEYS/URETERS: Unremarkable left kidney. Mild right hydronephrosis with   slightly prominent right proximal and mid ureter, without obstructing   stone.  BLADDER: De La Garza catheter.  REPRODUCTIVE ORGANS: Limited evaluation secondary to streak artifact from   hyperdense colonic intraluminal content.  BOWEL: No bowel obstruction. Moderate rectal stool burden. Appendix is   normal.  PERITONEUM: No ascites.  VESSELS: Atherosclerotic changes.  RETROPERITONEUM/LYMPH NODES: No lymphadenopathy.  ABDOMINAL WALL: Right anterior abdominal wall soft tissue swelling and   air, likely from injection site (3-111).  BONES: Degenerative changes. Grade 1 anterolisthesis of L5 on S1.  IMPRESSION:  Normal appearance of adrenals.  Mild right hydronephrosis and mild prominence of the proximal-mid ureter   without obstructing stone.  --- End of Report ---

## 2024-04-17 NOTE — DISCHARGE NOTE PROVIDER - NSDCFUADDAPPT_GEN_ALL_CORE_FT
================================  Podiatry Discharge Instructions:  - Follow up: Please follow up with Dr. Meza within 1 week of discharge from the hospital, please call [phone] for appointment and discuss that you recently were seen in the hospital.  - Wound Care: please apply IODOSORB to L anterior ankle wound and R plantar forefoot wound followed by 4x4 Gauze, and Kerlex. DAILY   - Weight bearing: Please weight bearing as tolerated in a surgical shoes and - STRICT decubitus precautions, Z- FLOWS boots at all time when in bed and in chair resting.    - Antibiotics: Please continue as instructed.  ================================ ================================  Podiatry Discharge Instructions:  - Follow up: Please follow up with Dr. Meza within 1 week of discharge from the hospital, please call [phone] for appointment and discuss that you recently were seen in the hospital.  - Wound Care: please apply Mupirocin to L anterior ankle wound and R plantar forefoot wound followed by 4x4 Gauze, and Kerlex. DAILY   - Weight bearing: Please weight bearing as tolerated in a surgical shoes and - STRICT decubitus precautions, Z- FLOWS boots at all time when in bed and in chair resting.    - Antibiotics: Please continue as instructed.  ================================

## 2024-04-17 NOTE — PROGRESS NOTE ADULT - PROBLEM SELECTOR PLAN 1
RRT overnight for sepsis rectal temp 102, hypotension to systolic 70s, no leukocytosis.   CXR wnl  new onset vomiting clear  s/p 250 cc bolus x2, bumex gtt held  DDx inc: Delayed contrast reaction vs infection  -F/u MRSA swab+     Plan:  -F/u Bcx, Ucx  -c/w Cefepime (PCN allergy) 2g q24h (renally dose) and Vanc, dose by level   -F/u derm consult  -Held bumex gtt iso hypotension RRT 4/16 for sepsis rectal temp 102, hypotension to systolic 70s, no leukocytosis, with new onset vomiting and rash  DDx inc: Delayed contrast reaction vs infection  -F/u MRSA swab+   -BCx 4/16 NGTD,     Plan:  -F/u Ucx, repeat CXR   -c/w Cefepime (PCN allergy) 2g q24h (renally dose) and Vanc, dose by level   -F/u derm consult - likely drug related rash vs infectious  -ID consulted  - s/p 500 cc bolus, gave 50cc albumin, bumex IVP and started gtt

## 2024-04-17 NOTE — CONSULT NOTE ADULT - ASSESSMENT
73F w HFrEF (EF 30%), mod AS, known LBBB, HTN, T1DM, CKD, hypothyroidism, chronic lymphedema, suspected OHS/LELIA on 3L NC home O2 p/w 1 episode of syncope with R arm jerking, likely 2/2 severe symptomatic AS. CTA imagings performed for TAVR eval, c/b DUNCAN on CKD. C/b febrile and hypotension, sepsis workup pending. CT c/a/p w/w/o IV contrast revealed incidental finding of L adrenal nodule.    #L adrenal nodule   Incidentally found on CT c/a/p w/wo IV contrast done for TAVR evaluation. The left adrenal gland is thickened and a 1.2 x 0.8 cm left adrenal nodule is seen. The right adrenal gland is normal.  Primary team discussed with radiology. HU of the adrenal nodule not able to be accurately determined due to motion artifact, also given imaging was taken at venous phase.     Reports no prior known hx of adrenal nodule.  Has HTN, normally well controlled on BP. Inpatient, she has been hypotensive. No episodic tachycardia, headaches, sweating. Denies weight changes prior to hospitalization. Denies easy bruising, bleeding normally although has had bruising with heparin shots inpatient.    Recommendations:  - Test for excess adrenal hormones:  = Check plasma metanephrines  = Check serum aldosterone and plasma renin activity   = Overnight dexamethasone suppression: Administer oral dexamethasone 1mg at midnight, then check a serum AM cortisol (NOT free or ESO cortisol) and dexamethasone level at 8am the following morning.  - Of note, CT features suggesting malignancy include HU >10, mass size >4cm, and >50% contrast retention seen 10 minutes after contrast administration. Patient developed possible reaction to IV contrast with rash, fever, hypotension. Would not be appropriate in regards to patient safety to pursue CT adrenal protocol which would require IV contrast at this time.  - Will need follow up outpatient with Dr. Luis Dumont      #T1DM  Has hx of T1DM since age 26  Endocrinologist: Dr. Luis Dumont  Home regimen: Lantus 33 units qhs, Novolog based on sliding scale TIDAC normally 3-5 units  Has Dexcom G7  A1c is 7.4%  Reports poor PO intake while hospitalized. Has had decreasing insulin requirements.  - Inpatient BG goal 100-180  - Lantus 12 units QHS  - Low dose admelog correction scale TIDAC  - Low dose admelog correction scale QHS  - CC diet  - Of note, patient instructed on discharge from recent hospitalization at [facility] to start farxiga for CHF. Given risks of DKA of SGLT2i in T1DM, would not start until better data is available for its benefits.  - Discharge regimen: home basal/bolus insulin, dose TBD.  - Follow up with Dr. Luis Dumont outpatient      #Hx of Hypothyroidism  TSH slightly elevated to 4.79-5.06 with normal FT4 1.3-1.5  - continue home LT4 75mcg daily  - repeat TFT's outpatient when clinically stabilized      Calvin Astudillo MD  Endocrine Fellow  Can be reached via teams. For follow up questions, discharge recommendations, or new consults, please call answering service at [phone] (weekdays); [phone] (nights/weekends). 73F w HFrEF (EF 30%), mod AS, known LBBB, HTN, T1DM, CKD, hypothyroidism, chronic lymphedema, suspected OHS/LELIA on 3L NC home O2 p/w 1 episode of syncope with R arm jerking, likely 2/2 severe symptomatic AS. CTA imagings performed for TAVR eval, c/b DUNCAN on CKD. C/b febrile and hypotension, sepsis workup pending. CT c/a/p w/w/o IV contrast revealed incidental finding of L adrenal nodule.    #L adrenal nodule   Incidentally found on CT c/a/p w/wo IV contrast done for TAVR evaluation. The left adrenal gland is thickened and a 1.2 x 0.8 cm left adrenal nodule is seen. The right adrenal gland is normal.  Primary team discussed with radiology. HU of the adrenal nodule not able to be accurately determined due to motion artifact, also given imaging was taken at venous phase.     Reports no prior known hx of adrenal nodule.  Has HTN, normally well controlled on BP. Inpatient, she has been hypotensive. No episodic tachycardia, headaches, sweating. Denies weight changes prior to hospitalization. Denies easy bruising, bleeding normally although has had bruising with heparin shots inpatient.    Recommendations:  - Test for excess adrenal hormones:  = Check plasma metanephrines  = Check serum aldosterone and plasma renin activity   = Overnight dexamethasone suppression: Administer oral dexamethasone 1mg at midnight, then check a serum AM cortisol (NOT free or ESO cortisol) and dexamethasone level at 8am the following morning.  - Nonurgent CT non-contrast adrenal protocol to determine hounsfield units of adrenal nodule. (Of note, CT features suggesting malignancy include HU >10, mass size >4cm, and >50% contrast retention seen 10 minutes after contrast administration (contrast retention is of low specificity/sensitivity).)  - Will need follow up outpatient with Dr. Luis Dumont      #T1DM  Has hx of T1DM since age 26  Endocrinologist: Dr. Luis Dumont  Home regimen: Lantus 33 units qhs, Novolog based on sliding scale TIDAC normally 3-5 units  Has Dexcom G7  A1c is 7.4%  Reports poor PO intake while hospitalized. Has had decreasing insulin requirements.  - Inpatient BG goal 100-180  - Lantus 12 units QHS  - Low dose admelog correction scale TIDAC  - Low dose admelog correction scale QHS  - CC diet  - Of note, patient instructed on discharge from recent hospitalization at [facility] to start farxiga for CHF. Given risks of DKA of SGLT2i in T1DM, would not start until better data is available for its benefits.  - Discharge regimen: home basal/bolus insulin, dose TBD.  - Follow up with Dr. Luis Dumont outpatient      #Hx of Hypothyroidism  TSH slightly elevated to 4.79-5.06 with normal FT4 1.3-1.5  - continue home LT4 75mcg daily  - repeat TFT's outpatient when clinically stabilized      Calvin Astudillo MD  Endocrine Fellow  Can be reached via teams. For follow up questions, discharge recommendations, or new consults, please call answering service at [phone] (weekdays); [phone] (nights/weekends). 73F w HFrEF (EF 30%), mod AS, known LBBB, HTN, T1DM, CKD, hypothyroidism, chronic lymphedema, suspected OHS/LELIA on 3L NC home O2 p/w 1 episode of syncope with R arm jerking, likely 2/2 severe symptomatic AS. CTA imagings performed for TAVR eval, c/b DUNCAN on CKD. C/b febrile and hypotension, sepsis workup pending. CT c/a/p w/w/o IV contrast revealed incidental finding of L adrenal nodule.    #L adrenal nodule   Incidentally found on CT c/a/p w/wo IV contrast done for TAVR evaluation. The left adrenal gland is thickened and a 1.2 x 0.8 cm left adrenal nodule is seen. The right adrenal gland is normal.  Primary team discussed with radiology. HU of the adrenal nodule not able to be accurately determined due to motion artifact, also given imaging was taken at venous phase.     Reports no prior known hx of adrenal nodule.  Has HTN, normally well controlled on BP. Inpatient, she has been hypotensive. No episodic tachycardia, headaches, sweating. Denies weight changes prior to hospitalization. Denies easy bruising, bleeding normally although has had bruising with heparin shots inpatient.    Recommendations:  - Test for excess adrenal hormones:  = Check plasma metanephrines  = Check serum aldosterone and plasma renin activity   = Overnight dexamethasone suppression: Administer oral dexamethasone 1mg at midnight, then check a serum AM cortisol (NOT free or ESO cortisol) and dexamethasone level at 8am the following morning.  - Nonurgent CT non-contrast adrenal protocol to determine hounsfield units of adrenal nodule. (Of note, CT features suggesting malignancy include HU >10, mass size >4cm, and >50% contrast retention seen 10 minutes after contrast administration (contrast retention is of low specificity/sensitivity).)  - Will need follow up outpatient with Dr. Luis Dumont      #T1DM  Has hx of T1DM since age 26  Endocrinologist: Dr. Luis Dumont  Home regimen: Lantus 33 units qhs, Novolog based on sliding scale TIDAC normally 3-5 units  Has Dexcom G7  A1c is 7.4%  Reports poor PO intake while hospitalized. Has had decreasing insulin requirements.  - Inpatient BG goal 100-180  - Lantus 12 units QHS  - Low dose admelog correction scale TIDAC  - Low dose admelog correction scale QHS  - CC diet  - Of note, patient instructed on discharge from recent hospitalization at [facility] to start farxiga for CHF. Given risks of DKA of SGLT2i in T1DM, would not start until better data is available for its benefits.  - Discharge regimen: home basal/bolus insulin, dose TBD.  - Follow up with Dr. Luis Dumont outpatient      #Hx of Hypothyroidism  TSH slightly elevated to 4.79-5.06 with normal FT4 1.3-1.5  - continue home LT4 75mcg daily  - repeat TFT's outpatient when clinically stabilized      Discussed with primary team Dr. Vic Astudillo MD  Endocrine Fellow  Can be reached via teams. For follow up questions, discharge recommendations, or new consults, please call answering service at [phone] (weekdays); [phone] (nights/weekends).

## 2024-04-17 NOTE — PROGRESS NOTE ADULT - NUTRITIONAL ASSESSMENT
This patient has been assessed with a concern for Malnutrition and has been determined to have a diagnosis/diagnoses of Morbid obesity (BMI > 40).    This patient is being managed with:   Diet Regular-  Consistent Carbohydrate {Evening Snack} (CSTCHOSN)  DASH/TLC {Sodium & Cholesterol Restricted} (DASH)  1500mL Fluid Restriction ([id number])  Entered: Apr 12 2024  6:32PM

## 2024-04-17 NOTE — CONSULT NOTE ADULT - SUBJECTIVE AND OBJECTIVE BOX
NOTE INCOMPLETE/ IN PROGRESS  *Please wait for attending attestation for official recommendations.     HPI:  73F w HFrEF (EF 30%), mod AS, known LBBB, HTN, T1DM, CKD, hypothyroidism, chronic lymphedema, suspected OHS/LELIA on 3L NC home O2 p/w 1 episode of syncope. Recent admission at [facility] (3/29-4/5) for ADHF and DUNCAN s/p diuresis, discharged with new home O2 3L NC suspecting OHS/LELIA pending outpatient w/u. Presented w/ 1 episode of syncope with R arm jerking, likely 2/2 severe symptomatic AS. CTA imagings performed for TAVR eval, c/b DUNCAN on CKD. C/b febrile and hypotension, sepsis workup pending.  Patient received CT c/a/p w/w/o IV contrast with incidental finding of L adrenal nodule.      Consulted for: L adrenal nodule    Endocrine history:  Reports no prior known hx of adrenal nodule.  Has HTN, normally well controlled on BP. Inpatient, she has been hypotensive. No episodic tachycardia, headaches, sweating. Denies weight changes prior to hospitalization. Denies easy bruising, bleeding normally although has had bruising with heparin shots inpatient.    Has hx of T1DM since age 26  Endocrinologist: Dr. Luis Dumont  Home regimen: Lantus 33 units qhs, Novolog based on sliding scale TIDAC normally 3-5 units  Has Dexcom G7  Reports poor PO intake while hospitalized. Has had decreasing insulin requirements.    Hx of Hypothyroidism  Takes LT4 75mcg daily    Denies hx of DM, thyroid, or adrenal disorders in the family        PAST MEDICAL & SURGICAL HISTORY:  Hypertension      Diabetes      Lymphedema      H/O left bundle branch block      History of left bundle branch block (LBBB)      Systolic heart failure, chronic      Type 1 diabetes      H/O cataract  2020      History of surgical removal of meniscus of knee  left in 1971      Frozen shoulder  2000      H/O Achilles tendon repair  lengthened bilaterally, 2000      Fractured skull  1968          FAMILY HISTORY:  Family history of CVA  mom, age 57    Denies hx of DM, thyroid, or adrenal disorders in the familly    Social History:  Lives with partner  ambulates with walker      Outpatient Medications:  Home Medications:  aspirin 81 mg oral tablet, chewable: 1 tab(s) orally once a day (12 Apr 2024 17:37)  atorvastatin 80 mg oral tablet: 1 tab(s) orally once a day (at bedtime) (12 Apr 2024 17:37)  cholecalciferol 100 mcg (4000 intl units) oral tablet: 1 tab(s) orally once a day (16 Apr 2024 12:35)  ferrous sulfate 324 mg (65 mg elemental iron) oral tablet: 1 tab(s) orally 2 times a day (12 Apr 2024 17:37)  Lantus 100 units/mL subcutaneous solution: 37 unit(s) subcutaneous once a day (at bedtime) (16 Apr 2024 12:22)  levothyroxine 75 mcg (0.075 mg) oral tablet: 1 tab(s) orally once a day (12 Apr 2024 17:37)  metoprolol succinate 100 mg oral tablet, extended release: 1 tab(s) orally once a day (12 Apr 2024 17:37)  Multiple Vitamins oral tablet: 1 tab(s) orally once a day (16 Apr 2024 12:27)  NovoLOG FlexPen 100 units/mL injectable solution: injectable 3 times a day per sliding scale regimen. MAX dose 40 units (16 Apr 2024 12:26)  potassium chloride 10 mEq oral tablet, extended release: 2 tab(s) orally once a day (16 Apr 2024 12:26)  torsemide 20 mg oral tablet: 1 tab(s) orally 2 times a day (16 Apr 2024 12:26)      MEDICATIONS  (STANDING):  ascorbic acid 500 milliGRAM(s) Oral daily  aspirin  chewable 81 milliGRAM(s) Oral daily  atorvastatin 80 milliGRAM(s) Oral at bedtime  buMETAnide Infusion 0.5 mG/Hr (2.5 mL/Hr) IV Continuous <Continuous>  calamine/zinc oxide Lotion 1 Application(s) Topical three times a day  cefepime   IVPB 2000 milliGRAM(s) IV Intermittent every 24 hours  dextrose 10% Bolus 125 milliLiter(s) IV Bolus once  dextrose 5%. 1000 milliLiter(s) (100 mL/Hr) IV Continuous <Continuous>  dextrose 5%. 1000 milliLiter(s) (50 mL/Hr) IV Continuous <Continuous>  dextrose 50% Injectable 12.5 Gram(s) IV Push once  dextrose 50% Injectable 25 Gram(s) IV Push once  ferrous    sulfate 325 milliGRAM(s) Oral two times a day  glucagon  Injectable 1 milliGRAM(s) IntraMuscular once  heparin   Injectable 5000 Unit(s) SubCutaneous every 8 hours  insulin glargine Injectable (LANTUS) 12 Unit(s) SubCutaneous at bedtime  insulin lispro (ADMELOG) corrective regimen sliding scale   SubCutaneous at bedtime  insulin lispro (ADMELOG) corrective regimen sliding scale   SubCutaneous three times a day before meals  lactated ringers. 500 milliLiter(s) (100 mL/Hr) IV Continuous <Continuous>  levothyroxine 75 MICROGram(s) Oral daily  multivitamin 1 Tablet(s) Oral daily  mupirocin 2% Nasal 1 Application(s) Both Nostrils two times a day  nystatin    Suspension 518340 Unit(s) Oral four times a day  nystatin/triamcinolone Cream 1 Application(s) Topical every 12 hours  pantoprazole    Tablet 40 milliGRAM(s) Oral before breakfast  triamcinolone 0.1% Ointment 1 Application(s) Topical two times a day    MEDICATIONS  (PRN):  dextrose Oral Gel 15 Gram(s) Oral once PRN Blood Glucose LESS THAN 70 milliGRAM(s)/deciliter  diphenhydrAMINE Injectable 50 milliGRAM(s) IV Push every 4 hours PRN Rash and/or Itching  ondansetron Injectable 4 milliGRAM(s) IV Push every 6 hours PRN Nausea and/or Vomiting      Allergies    contrast media (iodine-based) (Fever; Vomiting; Flushing; Hypotension; Rash; Stomach Upset)  Ativan (Rash; Urticaria)  penicillins (Hives)    Intolerances      Review of Systems:  Constitutional: + fever  Eyes: No blurry vision  Neuro: No tremors  HEENT: No pain  Cardiovascular: No chest pain, palpitations  Respiratory: + SOB  GI: + vomiting. no abdominal pain  : No dysuria  Skin: + rash  Psych: no depression  Endocrine: no polyuria, polydipsia  Hem/lymph: no swelling  Osteoporosis: no fractures    ALL OTHER SYSTEMS REVIEWED AND NEGATIVE    UNABLE TO OBTAIN    PHYSICAL EXAM:  VITALS: T(C): 36.9 (04-17-24 @ 12:21)  T(F): 98.4 (04-17-24 @ 12:21), Max: 98.4 (04-17-24 @ 12:21)  HR: 74 (04-17-24 @ 12:21) (74 - 108)  BP: 106/66 (04-17-24 @ 12:21) (89/52 - 106/66)  RR:  (17 - 18)  SpO2:  (93% - 98%)  Wt(kg): --  GENERAL: NAD, obese  EYES: No proptosis, no lid lag, anicteric  HEENT:  Atraumatic, Normocephalic, moist mucous membranes  RESPIRATORY: Clear to auscultation bilaterally; No rales, rhonchi, wheezing  CARDIOVASCULAR: +systolic murmur, +peripheral edema  GI: Soft, nontender, non distended  SKIN: whole body erythema. scattered ecchymosis throughout belly and arms. BLE bandaged  MUSCULOSKELETAL: PEREZ  NEURO: sensation intact, extraocular movements intact, no tremor  PSYCH: Alert and oriented  CUSHING'S SIGNS: no striae      CAPILLARY BLOOD GLUCOSE      POCT Blood Glucose.: 123 mg/dL (17 Apr 2024 13:06)  POCT Blood Glucose.: 136 mg/dL (17 Apr 2024 09:07)  POCT Blood Glucose.: 139 mg/dL (16 Apr 2024 22:29)  POCT Blood Glucose.: 126 mg/dL (16 Apr 2024 21:25)  POCT Blood Glucose.: 89 mg/dL (16 Apr 2024 19:18)  POCT Blood Glucose.: 91 mg/dL (16 Apr 2024 18:56)  POCT Blood Glucose.: 69 mg/dL (16 Apr 2024 18:19)  POCT Blood Glucose.: 64 mg/dL (16 Apr 2024 17:31)                            11.5   12.90 )-----------( 159      ( 17 Apr 2024 11:00 )             37.0       04-17    133<L>  |  91<L>  |  77<H>  ----------------------------<  121<H>  5.0   |  30  |  3.62<H>    eGFR: 13<L>    Ca    8.6      04-17  Mg     2.1     04-17  Phos  3.6     04-17    TPro  6.3  /  Alb  2.9<L>  /  TBili  1.1  /  DBili  x   /  AST  14  /  ALT  7<L>  /  AlkPhos  47  04-17      Thyroid Function Tests:  04-14 @ 07:18 TSH 5.06 FreeT4 1.3 T3 -- Anti TPO -- Anti Thyroglobulin Ab -- TSI --  04-13 @ 07:05 TSH 4.79 FreeT4 1.5 T3 -- Anti TPO -- Anti Thyroglobulin Ab -- TSI --      A1C with Estimated Average Glucose Result: 7.4 % (03-30-24 @ 08:21)  A1C with Estimated Average Glucose Result: 6.8 % (01-10-24 @ 08:37)  A1C with Estimated Average Glucose Result: 7.3 % (08-12-23 @ 07:58)  A1C with Estimated Average Glucose Result: 8.2 % (06-05-23 @ 06:10)      04-13 Chol 74 Direct LDL -- LDL calculated 25 HDL 33<L> Trig 70    Radiology:     < from: CT Angio Abdomen and Pelvis TAVR DENIA w/wo IV Cont (04.15.24 @ 15:30) >    Adrenal glands and kidneys: The right adrenal gland is normal. The left   adrenal gland is thickened. There is a 1.2 x 0.8 cm left adrenal nodule.    < end of copied text >

## 2024-04-17 NOTE — PROGRESS NOTE ADULT - SUBJECTIVE AND OBJECTIVE BOX
PROGRESS NOTE:   Authored by Dr. Mireya Hayden MD (PGY-1). Pager [facility] [phone]/ [facility]     Patient is a 73y old  Female who presents with a chief complaint of Syncope (16 Apr 2024 15:08)      SUBJECTIVE / OVERNIGHT EVENTS:  No acute events overnight.     All other ROS neg except as above in HPI    MEDICATIONS  (STANDING):  ascorbic acid 500 milliGRAM(s) Oral daily  aspirin  chewable 81 milliGRAM(s) Oral daily  atorvastatin 80 milliGRAM(s) Oral at bedtime  buMETAnide Infusion 0.5 mG/Hr (2.5 mL/Hr) IV Continuous <Continuous>  calamine/zinc oxide Lotion 1 Application(s) Topical three times a day  cefepime   IVPB 2000 milliGRAM(s) IV Intermittent every 24 hours  dextrose 10% Bolus 125 milliLiter(s) IV Bolus once  dextrose 5%. 1000 milliLiter(s) (100 mL/Hr) IV Continuous <Continuous>  dextrose 5%. 1000 milliLiter(s) (50 mL/Hr) IV Continuous <Continuous>  dextrose 50% Injectable 12.5 Gram(s) IV Push once  dextrose 50% Injectable 25 Gram(s) IV Push once  ferrous    sulfate 325 milliGRAM(s) Oral two times a day  glucagon  Injectable 1 milliGRAM(s) IntraMuscular once  heparin   Injectable 5000 Unit(s) SubCutaneous every 8 hours  insulin glargine Injectable (LANTUS) 12 Unit(s) SubCutaneous at bedtime  insulin lispro (ADMELOG) corrective regimen sliding scale   SubCutaneous at bedtime  insulin lispro (ADMELOG) corrective regimen sliding scale   SubCutaneous three times a day before meals  levothyroxine 75 MICROGram(s) Oral daily  multivitamin 1 Tablet(s) Oral daily  mupirocin 2% Nasal 1 Application(s) Both Nostrils two times a day  nystatin    Suspension 679130 Unit(s) Oral four times a day  nystatin/triamcinolone Cream 1 Application(s) Topical every 12 hours  pantoprazole    Tablet 40 milliGRAM(s) Oral before breakfast  triamcinolone 0.1% Ointment 1 Application(s) Topical two times a day    MEDICATIONS  (PRN):  dextrose Oral Gel 15 Gram(s) Oral once PRN Blood Glucose LESS THAN 70 milliGRAM(s)/deciliter  diphenhydrAMINE Injectable 50 milliGRAM(s) IV Push every 4 hours PRN Rash and/or Itching  ondansetron Injectable 4 milliGRAM(s) IV Push every 6 hours PRN Nausea and/or Vomiting      CAPILLARY BLOOD GLUCOSE      POCT Blood Glucose.: 139 mg/dL (16 Apr 2024 22:29)  POCT Blood Glucose.: 126 mg/dL (16 Apr 2024 21:25)  POCT Blood Glucose.: 89 mg/dL (16 Apr 2024 19:18)  POCT Blood Glucose.: 91 mg/dL (16 Apr 2024 18:56)  POCT Blood Glucose.: 69 mg/dL (16 Apr 2024 18:19)  POCT Blood Glucose.: 64 mg/dL (16 Apr 2024 17:31)  POCT Blood Glucose.: 77 mg/dL (16 Apr 2024 12:49)  POCT Blood Glucose.: 103 mg/dL (16 Apr 2024 10:01)  POCT Blood Glucose.: 64 mg/dL (16 Apr 2024 09:06)  POCT Blood Glucose.: 72 mg/dL (16 Apr 2024 08:35)  POCT Blood Glucose.: 64 mg/dL (16 Apr 2024 08:11)    I&O's Summary    16 Apr 2024 07:01  -  17 Apr 2024 07:00  --------------------------------------------------------  IN: 770 mL / OUT: 0 mL / NET: 770 mL        PHYSICAL EXAM:  Vital Signs Last 24 Hrs  T(C): 36.7 (17 Apr 2024 04:24), Max: 37.1 (16 Apr 2024 11:52)  T(F): 98.1 (17 Apr 2024 04:24), Max: 98.7 (16 Apr 2024 11:52)  HR: 108 (17 Apr 2024 05:10) (76 - 108)  BP: 93/53 (17 Apr 2024 05:10) (89/52 - 105/62)  BP(mean): --  RR: 18 (17 Apr 2024 04:24) (17 - 18)  SpO2: 93% (17 Apr 2024 04:24) (93% - 98%)    Parameters below as of 17 Apr 2024 04:24  Patient On (Oxygen Delivery Method): nasal cannula  O2 Flow (L/min): 3      CONSTITUTIONAL: NAD, well-developed  HEET: MMM, EOMI, PERRLA  NECK: supple  RESPIRATORY: Normal respiratory effort; lungs are clear to auscultation bilaterally  CARDIOVASCULAR: Regular rate and rhythm, normal S1 and S2, no murmur/rub/gallop; No lower extremity edema; Peripheral pulses are 2+ bilaterally  ABDOMEN: Nontender to palpation, normoactive bowel sounds, no rebound/guarding; No hepatosplenomegaly  MUSCULOSKELETAL: no clubbing or cyanosis of digits; no joint swelling or tenderness to palpation  NEURO: Moving all four extremities, sensation grossly intact  PSYCH: A+O to person, place, and time; affect appropriate  SKIN: No rash    LABS:                        12.6   14.94 )-----------( 220      ( 16 Apr 2024 09:50 )             42.6     04-16    135  |  94<L>  |  66<H>  ----------------------------<  54<LL>  5.1   |  26  |  2.81<H>    Ca    8.4      16 Apr 2024 06:47  Phos  3.5     04-16  Mg     2.1     04-16    TPro  6.5  /  Alb  3.1<L>  /  TBili  1.0  /  DBili  x   /  AST  28  /  ALT  8<L>  /  AlkPhos  45  04-16          Urinalysis Basic - ( 16 Apr 2024 06:47 )    Color: x / Appearance: x / SG: x / pH: x  Gluc: 54 mg/dL / Ketone: x  / Bili: x / Urobili: x   Blood: x / Protein: x / Nitrite: x   Leuk Esterase: x / RBC: x / WBC x   Sq Epi: x / Non Sq Epi: x / Bacteria: x        Culture - Blood (collected 16 Apr 2024 00:28)  Source: .Blood Blood-Peripheral  Preliminary Report (17 Apr 2024 04:01):    No growth at 24 hours    Culture - Blood (collected 16 Apr 2024 00:20)  Source: .Blood Blood-Peripheral  Preliminary Report (17 Apr 2024 04:01):    No growth at 24 hours           PROGRESS NOTE:   Authored by Dr. Mireya Hayden MD (PGY-1). Pager [facility] [phone]/ [facility]     Patient is a 73y old  Female who presents with a chief complaint of Syncope (16 Apr 2024 15:08)      SUBJECTIVE / OVERNIGHT EVENTS:  No acute events overnight. Flushed today, similar to yesterday    All other ROS neg except as above in HPI    MEDICATIONS  (STANDING):  ascorbic acid 500 milliGRAM(s) Oral daily  aspirin  chewable 81 milliGRAM(s) Oral daily  atorvastatin 80 milliGRAM(s) Oral at bedtime  buMETAnide Infusion 0.5 mG/Hr (2.5 mL/Hr) IV Continuous <Continuous>  calamine/zinc oxide Lotion 1 Application(s) Topical three times a day  cefepime   IVPB 2000 milliGRAM(s) IV Intermittent every 24 hours  dextrose 10% Bolus 125 milliLiter(s) IV Bolus once  dextrose 5%. 1000 milliLiter(s) (100 mL/Hr) IV Continuous <Continuous>  dextrose 5%. 1000 milliLiter(s) (50 mL/Hr) IV Continuous <Continuous>  dextrose 50% Injectable 12.5 Gram(s) IV Push once  dextrose 50% Injectable 25 Gram(s) IV Push once  ferrous    sulfate 325 milliGRAM(s) Oral two times a day  glucagon  Injectable 1 milliGRAM(s) IntraMuscular once  heparin   Injectable 5000 Unit(s) SubCutaneous every 8 hours  insulin glargine Injectable (LANTUS) 12 Unit(s) SubCutaneous at bedtime  insulin lispro (ADMELOG) corrective regimen sliding scale   SubCutaneous at bedtime  insulin lispro (ADMELOG) corrective regimen sliding scale   SubCutaneous three times a day before meals  levothyroxine 75 MICROGram(s) Oral daily  multivitamin 1 Tablet(s) Oral daily  mupirocin 2% Nasal 1 Application(s) Both Nostrils two times a day  nystatin    Suspension 153422 Unit(s) Oral four times a day  nystatin/triamcinolone Cream 1 Application(s) Topical every 12 hours  pantoprazole    Tablet 40 milliGRAM(s) Oral before breakfast  triamcinolone 0.1% Ointment 1 Application(s) Topical two times a day    MEDICATIONS  (PRN):  dextrose Oral Gel 15 Gram(s) Oral once PRN Blood Glucose LESS THAN 70 milliGRAM(s)/deciliter  diphenhydrAMINE Injectable 50 milliGRAM(s) IV Push every 4 hours PRN Rash and/or Itching  ondansetron Injectable 4 milliGRAM(s) IV Push every 6 hours PRN Nausea and/or Vomiting      CAPILLARY BLOOD GLUCOSE      POCT Blood Glucose.: 139 mg/dL (16 Apr 2024 22:29)  POCT Blood Glucose.: 126 mg/dL (16 Apr 2024 21:25)  POCT Blood Glucose.: 89 mg/dL (16 Apr 2024 19:18)  POCT Blood Glucose.: 91 mg/dL (16 Apr 2024 18:56)  POCT Blood Glucose.: 69 mg/dL (16 Apr 2024 18:19)  POCT Blood Glucose.: 64 mg/dL (16 Apr 2024 17:31)  POCT Blood Glucose.: 77 mg/dL (16 Apr 2024 12:49)  POCT Blood Glucose.: 103 mg/dL (16 Apr 2024 10:01)  POCT Blood Glucose.: 64 mg/dL (16 Apr 2024 09:06)  POCT Blood Glucose.: 72 mg/dL (16 Apr 2024 08:35)  POCT Blood Glucose.: 64 mg/dL (16 Apr 2024 08:11)    I&O's Summary    16 Apr 2024 07:01  -  17 Apr 2024 07:00  --------------------------------------------------------  IN: 770 mL / OUT: 0 mL / NET: 770 mL        PHYSICAL EXAM:  Vital Signs Last 24 Hrs  T(C): 36.7 (17 Apr 2024 04:24), Max: 37.1 (16 Apr 2024 11:52)  T(F): 98.1 (17 Apr 2024 04:24), Max: 98.7 (16 Apr 2024 11:52)  HR: 108 (17 Apr 2024 05:10) (76 - 108)  BP: 93/53 (17 Apr 2024 05:10) (89/52 - 105/62)  BP(mean): --  RR: 18 (17 Apr 2024 04:24) (17 - 18)  SpO2: 93% (17 Apr 2024 04:24) (93% - 98%)    Parameters below as of 17 Apr 2024 04:24  Patient On (Oxygen Delivery Method): nasal cannula  O2 Flow (L/min): 3      CONSTITUTIONAL: NAD, well-developed  HEET: MMM, EOMI, PERRLA  NECK: supple  RESPIRATORY: Normal respiratory effort; lungs are clear to auscultation bilaterally  CARDIOVASCULAR: Regular rate and rhythm, normal S1 and S2, no murmur/rub/gallop; No lower extremity edema; Peripheral pulses are 2+ bilaterally  ABDOMEN: Nontender to palpation, normoactive bowel sounds, no rebound/guarding; No hepatosplenomegaly  MUSCULOSKELETAL: no clubbing or cyanosis of digits; no joint swelling or tenderness to palpation  NEURO: Moving all four extremities, sensation grossly intact  PSYCH: A+O to person, place, and time; affect appropriate  SKIN: No rash    LABS:                        12.6   14.94 )-----------( 220      ( 16 Apr 2024 09:50 )             42.6     04-16    135  |  94<L>  |  66<H>  ----------------------------<  54<LL>  5.1   |  26  |  2.81<H>    Ca    8.4      16 Apr 2024 06:47  Phos  3.5     04-16  Mg     2.1     04-16    TPro  6.5  /  Alb  3.1<L>  /  TBili  1.0  /  DBili  x   /  AST  28  /  ALT  8<L>  /  AlkPhos  45  04-16          Urinalysis Basic - ( 16 Apr 2024 06:47 )    Color: x / Appearance: x / SG: x / pH: x  Gluc: 54 mg/dL / Ketone: x  / Bili: x / Urobili: x   Blood: x / Protein: x / Nitrite: x   Leuk Esterase: x / RBC: x / WBC x   Sq Epi: x / Non Sq Epi: x / Bacteria: x        Culture - Blood (collected 16 Apr 2024 00:28)  Source: .Blood Blood-Peripheral  Preliminary Report (17 Apr 2024 04:01):    No growth at 24 hours    Culture - Blood (collected 16 Apr 2024 00:20)  Source: .Blood Blood-Peripheral  Preliminary Report (17 Apr 2024 04:01):    No growth at 24 hours           PROGRESS NOTE:   Authored by Dr. Mireya Hayden MD (PGY-1). Pager [facility] [phone]/ [facility]     Patient is a 73y old  Female who presents with a chief complaint of Syncope (16 Apr 2024 15:08)      SUBJECTIVE / OVERNIGHT EVENTS:  No acute events overnight. More flushed than yesterday. Persistent vomiting with nausea, unable to tolerate PO    All other ROS neg except as above in HPI    MEDICATIONS  (STANDING):  ascorbic acid 500 milliGRAM(s) Oral daily  aspirin  chewable 81 milliGRAM(s) Oral daily  atorvastatin 80 milliGRAM(s) Oral at bedtime  buMETAnide Infusion 0.5 mG/Hr (2.5 mL/Hr) IV Continuous <Continuous>  calamine/zinc oxide Lotion 1 Application(s) Topical three times a day  cefepime   IVPB 2000 milliGRAM(s) IV Intermittent every 24 hours  dextrose 10% Bolus 125 milliLiter(s) IV Bolus once  dextrose 5%. 1000 milliLiter(s) (100 mL/Hr) IV Continuous <Continuous>  dextrose 5%. 1000 milliLiter(s) (50 mL/Hr) IV Continuous <Continuous>  dextrose 50% Injectable 12.5 Gram(s) IV Push once  dextrose 50% Injectable 25 Gram(s) IV Push once  ferrous    sulfate 325 milliGRAM(s) Oral two times a day  glucagon  Injectable 1 milliGRAM(s) IntraMuscular once  heparin   Injectable 5000 Unit(s) SubCutaneous every 8 hours  insulin glargine Injectable (LANTUS) 12 Unit(s) SubCutaneous at bedtime  insulin lispro (ADMELOG) corrective regimen sliding scale   SubCutaneous at bedtime  insulin lispro (ADMELOG) corrective regimen sliding scale   SubCutaneous three times a day before meals  levothyroxine 75 MICROGram(s) Oral daily  multivitamin 1 Tablet(s) Oral daily  mupirocin 2% Nasal 1 Application(s) Both Nostrils two times a day  nystatin    Suspension 801893 Unit(s) Oral four times a day  nystatin/triamcinolone Cream 1 Application(s) Topical every 12 hours  pantoprazole    Tablet 40 milliGRAM(s) Oral before breakfast  triamcinolone 0.1% Ointment 1 Application(s) Topical two times a day    MEDICATIONS  (PRN):  dextrose Oral Gel 15 Gram(s) Oral once PRN Blood Glucose LESS THAN 70 milliGRAM(s)/deciliter  diphenhydrAMINE Injectable 50 milliGRAM(s) IV Push every 4 hours PRN Rash and/or Itching  ondansetron Injectable 4 milliGRAM(s) IV Push every 6 hours PRN Nausea and/or Vomiting      CAPILLARY BLOOD GLUCOSE      POCT Blood Glucose.: 139 mg/dL (16 Apr 2024 22:29)  POCT Blood Glucose.: 126 mg/dL (16 Apr 2024 21:25)  POCT Blood Glucose.: 89 mg/dL (16 Apr 2024 19:18)  POCT Blood Glucose.: 91 mg/dL (16 Apr 2024 18:56)  POCT Blood Glucose.: 69 mg/dL (16 Apr 2024 18:19)  POCT Blood Glucose.: 64 mg/dL (16 Apr 2024 17:31)  POCT Blood Glucose.: 77 mg/dL (16 Apr 2024 12:49)  POCT Blood Glucose.: 103 mg/dL (16 Apr 2024 10:01)  POCT Blood Glucose.: 64 mg/dL (16 Apr 2024 09:06)  POCT Blood Glucose.: 72 mg/dL (16 Apr 2024 08:35)  POCT Blood Glucose.: 64 mg/dL (16 Apr 2024 08:11)    I&O's Summary    16 Apr 2024 07:01  -  17 Apr 2024 07:00  --------------------------------------------------------  IN: 770 mL / OUT: 0 mL / NET: 770 mL        PHYSICAL EXAM:  Vital Signs Last 24 Hrs  T(C): 36.7 (17 Apr 2024 04:24), Max: 37.1 (16 Apr 2024 11:52)  T(F): 98.1 (17 Apr 2024 04:24), Max: 98.7 (16 Apr 2024 11:52)  HR: 108 (17 Apr 2024 05:10) (76 - 108)  BP: 93/53 (17 Apr 2024 05:10) (89/52 - 105/62)  BP(mean): --  RR: 18 (17 Apr 2024 04:24) (17 - 18)  SpO2: 93% (17 Apr 2024 04:24) (93% - 98%)    Parameters below as of 17 Apr 2024 04:24  Patient On (Oxygen Delivery Method): nasal cannula  O2 Flow (L/min): 3      CONSTITUTIONAL: NAD, well-developed, looks uncomfortable   HEET: MMM, EOMI, PERRLA  NECK: supple  RESPIRATORY: Normal respiratory effort; bibasilar crackles   CARDIOVASCULAR: Regular rate and rhythm, normal S1 and S2, chronic lymphedema   ABDOMEN: Nontender to palpation, normoactive bowel sounds, no rebound/guarding;  MUSCULOSKELETAL: full ROM   NEURO: Moving all four extremities, sensation grossly intact  PSYCH: A+O to person, place, and time; affect appropriate  SKIN: psoriatic lesions on b/l UE and lymphedema rashes on LE. flushed skin    LABS:                        12.6   14.94 )-----------( 220      ( 16 Apr 2024 09:50 )             42.6     04-16    135  |  94<L>  |  66<H>  ----------------------------<  54<LL>  5.1   |  26  |  2.81<H>    Ca    8.4      16 Apr 2024 06:47  Phos  3.5     04-16  Mg     2.1     04-16    TPro  6.5  /  Alb  3.1<L>  /  TBili  1.0  /  DBili  x   /  AST  28  /  ALT  8<L>  /  AlkPhos  45  04-16          Urinalysis Basic - ( 16 Apr 2024 06:47 )    Color: x / Appearance: x / SG: x / pH: x  Gluc: 54 mg/dL / Ketone: x  / Bili: x / Urobili: x   Blood: x / Protein: x / Nitrite: x   Leuk Esterase: x / RBC: x / WBC x   Sq Epi: x / Non Sq Epi: x / Bacteria: x        Culture - Blood (collected 16 Apr 2024 00:28)  Source: .Blood Blood-Peripheral  Preliminary Report (17 Apr 2024 04:01):    No growth at 24 hours    Culture - Blood (collected 16 Apr 2024 00:20)  Source: .Blood Blood-Peripheral  Preliminary Report (17 Apr 2024 04:01):    No growth at 24 hours

## 2024-04-17 NOTE — PROGRESS NOTE ADULT - SUBJECTIVE AND OBJECTIVE BOX
[facility]   [phone]  RENAL FOLLOW UP NOTE  --------------------------------------------------------------------------------  Chief Complaint:    24 hour events/subjective:    seen earlier and later again in afternoon   earlier  with nausea rash and still with sob with no uo     PAST HISTORY  --------------------------------------------------------------------------------  No significant changes to PMH, PSH, FHx, SHx, unless otherwise noted    ALLERGIES & MEDICATIONS  --------------------------------------------------------------------------------  Allergies    contrast media (iodine-based) (Fever; Vomiting; Flushing; Hypotension; Rash; Stomach Upset)  Ativan (Rash; Urticaria)  penicillins (Hives (Mod to Severe); Anaphylaxis (Mod to Severe); Short breath (Mod to Severe); Angioedema (Mod to Severe))    Intolerances      Standing Inpatient Medications  ascorbic acid 500 milliGRAM(s) Oral daily  aspirin  chewable 81 milliGRAM(s) Oral daily  atorvastatin 80 milliGRAM(s) Oral at bedtime  buMETAnide Infusion 0.5 mG/Hr IV Continuous <Continuous>  buMETAnide Injectable 2 milliGRAM(s) IV Push once  calamine/zinc oxide Lotion 1 Application(s) Topical three times a day  cefepime   IVPB 1000 milliGRAM(s) IV Intermittent every 24 hours  dexAMETHasone  Injectable 1 milliGRAM(s) IV Push once  dextrose 10% Bolus 125 milliLiter(s) IV Bolus once  dextrose 5%. 1000 milliLiter(s) IV Continuous <Continuous>  dextrose 5%. 1000 milliLiter(s) IV Continuous <Continuous>  dextrose 50% Injectable 12.5 Gram(s) IV Push once  dextrose 50% Injectable 25 Gram(s) IV Push once  ferrous    sulfate 325 milliGRAM(s) Oral two times a day  glucagon  Injectable 1 milliGRAM(s) IntraMuscular once  heparin   Injectable 5000 Unit(s) SubCutaneous every 8 hours  insulin glargine Injectable (LANTUS) 12 Unit(s) SubCutaneous at bedtime  insulin lispro (ADMELOG) corrective regimen sliding scale   SubCutaneous at bedtime  insulin lispro (ADMELOG) corrective regimen sliding scale   SubCutaneous three times a day before meals  lactated ringers. 500 milliLiter(s) IV Continuous <Continuous>  levothyroxine 75 MICROGram(s) Oral daily  loratadine Syrup 10 milliGRAM(s) Oral daily  multivitamin 1 Tablet(s) Oral daily  mupirocin 2% Nasal 1 Application(s) Both Nostrils two times a day  nystatin    Suspension 151666 Unit(s) Oral four times a day  nystatin/triamcinolone Cream 1 Application(s) Topical every 12 hours  pantoprazole    Tablet 40 milliGRAM(s) Oral before breakfast  triamcinolone 0.1% Ointment 1 Application(s) Topical two times a day    PRN Inpatient Medications  dextrose Oral Gel 15 Gram(s) Oral once PRN  diphenhydrAMINE Injectable 50 milliGRAM(s) IV Push every 4 hours PRN  metoclopramide Injectable 10 milliGRAM(s) IV Push every 6 hours PRN      REVIEW OF SYSTEMS  --------------------------------------------------------------------------------    Gen: denies  fevers/chills,  CVS: denies chest pain/palpitations  Resp: denies worsening  SOB/Cough  GI: Denies Abd pain + nausea   : Denies dysuria but no uo      VITALS/PHYSICAL EXAM  --------------------------------------------------------------------------------  T(C): 36.7 (04-17-24 @ 20:12), Max: 36.9 (04-17-24 @ 12:21)  HR: 99 (04-17-24 @ 20:12) (74 - 108)  BP: 92/56 (04-17-24 @ 20:12) (92/56 - 106/66)  RR: 17 (04-17-24 @ 20:12) (17 - 18)  SpO2: 93% (04-17-24 @ 20:12) (93% - 99%)  Wt(kg): --        04-16-24 @ 07:01  -  04-17-24 @ 07:00  --------------------------------------------------------  IN: 770 mL / OUT: 0 mL / NET: 770 mL    04-17-24 @ 07:01  -  04-17-24 @ 21:54  --------------------------------------------------------  IN: 502.5 mL / OUT: 0 mL / NET: 502.5 mL      Physical Exam:  		  	Gen: ill appearing, on O2, facial erythema, neck and arm erythema,   	Pulm: decrease bs  no rales or ronchi or wheezing  	CV: No JVD. RRR, S1S2; no rub II/VI M   	Abd: +BS, soft, nontender/nondistended, obese   	: No suprapubic tenderness  	UE: Warm, no cyanosis  no clubbing,  no edema  	LE: Warm, no cyanosis  no clubbing, 4+ edema, B/L LE raised red plaque   	Neuro: alert and oriented. speech coherent     LABS/STUDIES  --------------------------------------------------------------------------------              11.5   12.90 >-----------<  159      [04-17-24 @ 11:00]              37.0     133  |  91  |  77  ----------------------------<  121      [04-17-24 @ 11:00]  5.0   |  30  |  3.62        Ca     8.6     [04-17-24 @ 11:00]      Mg     2.1     [04-17-24 @ 11:00]      Phos  3.6     [04-17-24 @ 11:00]    TPro  6.3  /  Alb  2.9  /  TBili  1.1  /  DBili  x   /  AST  14  /  ALT  7   /  AlkPhos  47  [04-17-24 @ 11:00]          Creatinine Trend:  SCr 3.62 [04-17 @ 11:00]  SCr 2.81 [04-16 @ 06:47]  SCr 2.63 [04-15 @ 23:15]  SCr 2.60 [04-15 @ 06:52]  SCr 2.69 [04-14 @ 19:53]    Urine Creatinine 34      [04-12-24 @ 16:51]  Urine Protein 8      [04-12-24 @ 16:51]  Urine Sodium 27      [04-12-24 @ 16:51]  Urine Urea Nitrogen 243      [04-12-24 @ 18:07]  Urine Potassium 29      [04-12-24 @ 16:51]  Urine Osmolality 207      [04-12-24 @ 16:51]    TSH 5.06      [04-14-24 @ 07:18]  Lipid: chol 74, TG 70, HDL 33, LDL --      [04-13-24 @ 07:05]

## 2024-04-17 NOTE — DISCHARGE NOTE PROVIDER - DETAILS OF MALNUTRITION DIAGNOSIS/DIAGNOSES
This patient has been assessed with a concern for Malnutrition and was treated during this hospitalization for the following Nutrition diagnosis/diagnoses:     -  04/14/2024: Morbid obesity (BMI > 40)

## 2024-04-17 NOTE — CONSULT NOTE ADULT - SUBJECTIVE AND OBJECTIVE BOX
Patient is a 73y old  Female who presents with a chief complaint of Syncope (17 Apr 2024 14:10)    HPI:  73F w HFrEF (EF 30%), mod AS, known LBBB, HTN, IDDM1, CKD, hypothyroidism, chronic lymphedema, suspected OHS/LELIA on 3L NC home O2 p/w 1 episode of syncope. Recent admission at [facility] (3/29-4/5) for ADHF and DUNCAN s/p diuresis, discharged with new home O2 3L NC suspecting OHS/LELIA pending outpatient w/u. Since discharge a week ago, she has been staying at home with   Pt had sob walking to car. Once in car, she was still breathing heavily. Partner noticed pt was not responding to verbal stimuli for 10-15sec and witnessed R arm jerking. Pt gained consciousness quickly without postictal symptoms. Pt went to Cardiologist Dr. Nathan who recommended pt to come to ED. No fever, cp, abd pain, n/v. Leg swelling is improved from prior. Pt on torsemide 40mg which she has been taking. Pt was discharged on 3LNC which she is currently on. Patient reports she did not take Farxiga that she was discharged on as she was concerned about side effects.     In ED, patient was found afebrile, hemodynamically stable, breathing well on 3L NC. Initial labs notable for mild hyponatremia, DUNCAN on CKD, elevated proBNP and elevated pCO2 both improved from prior. (12 Apr 2024 16:31)      PAST MEDICAL & SURGICAL HISTORY:  Hypertension      Diabetes      Lymphedema      H/O left bundle branch block      History of left bundle branch block (LBBB)      Systolic heart failure, chronic      Type 1 diabetes      H/O cataract  2020      History of surgical removal of meniscus of knee  left in 1971      Frozen shoulder  2000      H/O Achilles tendon repair  lengthened bilaterally, 2000      Fractured skull  1968          Social history:    FAMILY HISTORY:  Family history of CVA  mom, age 57      REVIEW OF SYSTEMS  General:	Denies any malaise fatigue or chills. Fevers absent    Skin:No rash  	       Allergic/Immunologic:	No hives or rash   Allergies    contrast media (iodine-based) (Fever; Vomiting; Flushing; Hypotension; Rash; Stomach Upset)  Ativan (Rash; Urticaria)  penicillins (Hives)    Intolerances        Antimicrobials:    cefepime   IVPB 2000 milliGRAM(s) IV Intermittent every 24 hours  nystatin    Suspension 830068 Unit(s) Oral four times a day  vancomycin  IVPB 1500 milliGRAM(s) IV Intermittent once        Vital Signs Last 24 Hrs  T(C): 36.9 (17 Apr 2024 12:21), Max: 36.9 (17 Apr 2024 12:21)  T(F): 98.4 (17 Apr 2024 12:21), Max: 98.4 (17 Apr 2024 12:21)  HR: 74 (17 Apr 2024 12:21) (74 - 108)  BP: 106/66 (17 Apr 2024 12:21) (89/52 - 106/66)  BP(mean): 79 (17 Apr 2024 12:21) (79 - 79)  RR: 18 (17 Apr 2024 12:21) (17 - 18)  SpO2: 96% (17 Apr 2024 12:21) (93% - 98%)    Parameters below as of 17 Apr 2024 12:21  Patient On (Oxygen Delivery Method): nasal cannula       Neck:Supple,No LN,no JVD      Respiratory:Good air entry bilaterally,CTA    Cardiovascular:S1 S2 wnl, No murmurs,rub or gallops    Gastrointestinal:Soft BS(+) no tenderness no masses ,No rebound or guarding    Genitourinary:No CVA tendereness     Rectal:    Extremities:No cyanosis,clubbing or edema.    Vascular:peripheral pulses felt    Neurological:AAO X 3,No grossly focal deficits    Skin:No rash     Lymph Nodes:No palpable LNs    Musculoskeletal:No joint swelling or LOM    Psychiatric:Affect normal.                                11.5   12.90 )-----------( 159      ( 17 Apr 2024 11:00 )             37.0         04-17    133<L>  |  91<L>  |  77<H>  ----------------------------<  121<H>  5.0   |  30  |  3.62<H>    Ca    8.6      17 Apr 2024 11:00  Phos  3.6     04-17  Mg     2.1     04-17    TPro  6.3  /  Alb  2.9<L>  /  TBili  1.1  /  DBili  x   /  AST  14  /  ALT  7<L>  /  AlkPhos  47  04-17      RECENT CULTURES:  04-16 @ 19:11  .Tissue  --  --  --    Testing in progress  --  04-16 @ 00:28  .Blood Blood-Peripheral  --  --  --    No growth at 24 hours  --  04-16 @ 00:20  .Blood Blood-Peripheral  --  --  --    No growth at 24 hours  --      MICROBIOLOGY:  Culture Results:   Testing in progress (04-16 @ 19:11)  Culture Results:   No growth at 24 hours (04-16 @ 00:28)  Culture Results:   No growth at 24 hours (04-16 @ 00:20)          Radiology:      Assessment:        Recommendations and Plan:    Pager [phone]  After 5 pm/weekends or if no response :[phone]

## 2024-04-17 NOTE — PROGRESS NOTE ADULT - SUBJECTIVE AND OBJECTIVE BOX
24H hour events:     MEDICATIONS:  aspirin  chewable 81 milliGRAM(s) Oral daily  buMETAnide Infusion 0.5 mG/Hr IV Continuous <Continuous>  heparin   Injectable 5000 Unit(s) SubCutaneous every 8 hours    cefepime   IVPB 2000 milliGRAM(s) IV Intermittent every 24 hours  nystatin    Suspension 697176 Unit(s) Oral four times a day    diphenhydrAMINE Injectable 50 milliGRAM(s) IV Push every 4 hours PRN    ondansetron Injectable 4 milliGRAM(s) IV Push every 6 hours PRN    pantoprazole    Tablet 40 milliGRAM(s) Oral before breakfast    atorvastatin 80 milliGRAM(s) Oral at bedtime  dextrose 50% Injectable 12.5 Gram(s) IV Push once  dextrose 50% Injectable 25 Gram(s) IV Push once  dextrose Oral Gel 15 Gram(s) Oral once PRN  glucagon  Injectable 1 milliGRAM(s) IntraMuscular once  insulin glargine Injectable (LANTUS) 12 Unit(s) SubCutaneous at bedtime  insulin lispro (ADMELOG) corrective regimen sliding scale   SubCutaneous at bedtime  insulin lispro (ADMELOG) corrective regimen sliding scale   SubCutaneous three times a day before meals  levothyroxine 75 MICROGram(s) Oral daily    ascorbic acid 500 milliGRAM(s) Oral daily  calamine/zinc oxide Lotion 1 Application(s) Topical three times a day  dextrose 10% Bolus 125 milliLiter(s) IV Bolus once  dextrose 5%. 1000 milliLiter(s) IV Continuous <Continuous>  dextrose 5%. 1000 milliLiter(s) IV Continuous <Continuous>  ferrous    sulfate 325 milliGRAM(s) Oral two times a day  lactated ringers. 500 milliLiter(s) IV Continuous <Continuous>  multivitamin 1 Tablet(s) Oral daily  mupirocin 2% Nasal 1 Application(s) Both Nostrils two times a day  nystatin/triamcinolone Cream 1 Application(s) Topical every 12 hours  triamcinolone 0.1% Ointment 1 Application(s) Topical two times a day      REVIEW OF SYSTEMS:  Complete 12point ROS negative.    PHYSICAL EXAM:  T(C): 36.7 (04-17-24 @ 04:24), Max: 37.1 (04-16-24 @ 11:52)  HR: 108 (04-17-24 @ 05:10) (76 - 108)  BP: 93/53 (04-17-24 @ 05:10) (89/52 - 105/62)  RR: 18 (04-17-24 @ 04:24) (17 - 18)  SpO2: 93% (04-17-24 @ 04:24) (93% - 98%)    16 Apr 2024 07:01  -  17 Apr 2024 07:00  --------------------------------------------------------  IN: 770 mL / OUT: 0 mL / NET: 770 mL    Appearance: Normal	  HEENT:   Normal oral mucosa, PERRL, EOMI	  Cardiovascular: Normal S1 S2, regular. No JVD, No murmurs, No edema  Respiratory: Lungs clear to auscultation	  Psychiatry: A & O x 3, Mood & affect appropriate  Gastrointestinal:  Soft, Non-tender, + BS	  Skin: erythematous LE	  Extremities: Normal range of motion, No clubbing, cyanosis or edema  Vascular: Peripheral pulses palpable 2+ bilaterally      LABS:	 	    CBC Full  -  ( 16 Apr 2024 09:50 )  WBC Count : 14.94 K/uL  Hemoglobin : 12.6 g/dL  Hematocrit : 42.6 %  Platelet Count - Automated : 220 K/uL  Mean Cell Volume : 90.6 fl  Mean Cell Hemoglobin : 26.8 pg  Mean Cell Hemoglobin Concentration : 29.6 gm/dL  Auto Neutrophil # : 13.64 K/uL  Auto Lymphocyte # : 0.08 K/uL  Auto Monocyte # : 0.51 K/uL  Auto Eosinophil # : 0.56 K/uL  Auto Basophil # : 0.05 K/uL  Auto Neutrophil % : 91.4 %  Auto Lymphocyte % : 0.5 %  Auto Monocyte % : 3.4 %  Auto Eosinophil % : 3.7 %  Auto Basophil % : 0.3 %    04-16    135  |  94<L>  |  66<H>  ----------------------------<  54<LL>  5.1   |  26  |  2.81<H>  04-15    135  |  95<L>  |  65<H>  ----------------------------<  111<H>  4.3   |  20<L>  |  2.63<H>    Ca    8.4      16 Apr 2024 06:47  Ca    8.9      15 Apr 2024 23:15  Phos  3.5     04-16  Mg     2.1     04-16    TPro  6.5  /  Alb  3.1<L>  /  TBili  1.0  /  DBili  x   /  AST  28  /  ALT  8<L>  /  AlkPhos  45  04-16  TPro  7.7  /  Alb  3.6  /  TBili  0.9  /  DBili  x   /  AST  23  /  ALT  9<L>  /  AlkPhos  71  04-15    Tele: NSR, 1st degree 70-100bpm     TTE:  TRANSTHORACIC ECHOCARDIOGRAM REPORT    Pt. Name:       EVELYN LOPEZ Study Date:    4/16/2024  MRN:            [id number]        YOB: 1950  Accession #:    [id number]        Age:           73 years  Account#:       [id number]     Gender:        F  Heart Rate:     84 bpm           Height:        70.00 in (177.80 cm)  Rhythm:                          Weight:        330.00 lb (149.69 kg)  Blood Pressure: 135/69 mmHg      BSA/BMI:       2.58 m² / 47.35 kg/m²  ________________________________________________________________________________________  Referring Physician:    [id number] Anitra Morgan  Interpreting Physician: Gibran Cnaela MD  Primary Sonographer:    Getachew Watts [facility]    CPT:               ECHO TTE W/O CON F/U [facility] - 09318.m;LIMITED SPECTRAL - 79101.m  Indication(s):     Cardiac murmur, unspecified - R01.1  Procedure:         Limited transthoracic echocardiogram.  Ordering Location: [facility]  Admission Status:  Inpatient  Study Information: Image quality for this study is fair.     CONCLUSIONS:      1. Limited study to evaluate the aortic valve.   2. Severe aortic stenosis.    FINDINGS:     Aortic Valve:  There is severe aortic stenosis. The peak transaortic velocity is 4.29 m/s, peak transaortic gradient is 73.6 mmHg and mean transaortic gradient is 43.0 mmHg with an LVOT/aortic valve VTI ratio of 0.21. The aortic valve area is estimated at 0.93 cm² by the continuity equation. There is trace aortic regurgitation.  ____________________________________________________________________  QUANTITATIVE DATA:     LVOT / RVOT/ Qp/Qs Data: (Indexed to BSA)  LVOT Diameter:  2.40 cm  LVOT Area:      4.52 cm²  LVOT Vmax:      0.90 m/s  LVOT Vmn:       0.635 m/s  LVOT VTI:       21.20 cm  LVOT peak grad: 3 mmHg  LVOT mean grad: 11.7 mmHg  LVOT SV:        95.9 ml   37.14 ml/m²    Aortic Valve Measurements:  AV Vmax:                4.3 m/s  AV Peak Gradient:       73.6 mmHg  AV Mean Gradient:       43.0 mmHg  AV VTI:                 103.0 cm  AV VTI Ratio:           0.21  AoV EOA, Contin:        0.93 cm²  AoV EOA, Contin i:      0.36 cm²/m²  AoV Dimensionless Index 0.21  ______________________________________________________________________________  Electronically signed on 4/16/2024 at 6:27:46 PM by Gibran Canela MD       *** Final ***           24H hour events: No overnight events. Resting in bed with her partner at her bedside.     MEDICATIONS:  aspirin  chewable 81 milliGRAM(s) Oral daily  buMETAnide Infusion 0.5 mG/Hr IV Continuous <Continuous>  heparin   Injectable 5000 Unit(s) SubCutaneous every 8 hours    cefepime   IVPB 2000 milliGRAM(s) IV Intermittent every 24 hours  nystatin    Suspension 241332 Unit(s) Oral four times a day    diphenhydrAMINE Injectable 50 milliGRAM(s) IV Push every 4 hours PRN    ondansetron Injectable 4 milliGRAM(s) IV Push every 6 hours PRN    pantoprazole    Tablet 40 milliGRAM(s) Oral before breakfast    atorvastatin 80 milliGRAM(s) Oral at bedtime  dextrose 50% Injectable 12.5 Gram(s) IV Push once  dextrose 50% Injectable 25 Gram(s) IV Push once  dextrose Oral Gel 15 Gram(s) Oral once PRN  glucagon  Injectable 1 milliGRAM(s) IntraMuscular once  insulin glargine Injectable (LANTUS) 12 Unit(s) SubCutaneous at bedtime  insulin lispro (ADMELOG) corrective regimen sliding scale   SubCutaneous at bedtime  insulin lispro (ADMELOG) corrective regimen sliding scale   SubCutaneous three times a day before meals  levothyroxine 75 MICROGram(s) Oral daily    ascorbic acid 500 milliGRAM(s) Oral daily  calamine/zinc oxide Lotion 1 Application(s) Topical three times a day  dextrose 10% Bolus 125 milliLiter(s) IV Bolus once  dextrose 5%. 1000 milliLiter(s) IV Continuous <Continuous>  dextrose 5%. 1000 milliLiter(s) IV Continuous <Continuous>  ferrous    sulfate 325 milliGRAM(s) Oral two times a day  lactated ringers. 500 milliLiter(s) IV Continuous <Continuous>  multivitamin 1 Tablet(s) Oral daily  mupirocin 2% Nasal 1 Application(s) Both Nostrils two times a day  nystatin/triamcinolone Cream 1 Application(s) Topical every 12 hours  triamcinolone 0.1% Ointment 1 Application(s) Topical two times a day      REVIEW OF SYSTEMS:  Complete 12point ROS negative.    PHYSICAL EXAM:  T(C): 36.7 (04-17-24 @ 04:24), Max: 37.1 (04-16-24 @ 11:52)  HR: 108 (04-17-24 @ 05:10) (76 - 108)  BP: 93/53 (04-17-24 @ 05:10) (89/52 - 105/62)  RR: 18 (04-17-24 @ 04:24) (17 - 18)  SpO2: 93% (04-17-24 @ 04:24) (93% - 98%)    16 Apr 2024 07:01  -  17 Apr 2024 07:00  --------------------------------------------------------  IN: 770 mL / OUT: 0 mL / NET: 770 mL    Appearance: Normal	  HEENT:   Normal oral mucosa, PERRL, EOMI	  Cardiovascular: Normal S1 S2, regular. No JVD, No murmurs, No edema  Respiratory: Lungs clear to auscultation	  Psychiatry: A & O x 3, Mood & affect appropriate  Gastrointestinal:  Soft, Non-tender, + BS	  Skin: erythematous rash	  Extremities: Normal range of motion, No clubbing, cyanosis.  BL/LE edema   Vascular: Peripheral pulses palpable 2+ bilaterally      LABS:	 	    CBC Full  -  ( 16 Apr 2024 09:50 )  WBC Count : 14.94 K/uL  Hemoglobin : 12.6 g/dL  Hematocrit : 42.6 %  Platelet Count - Automated : 220 K/uL  Mean Cell Volume : 90.6 fl  Mean Cell Hemoglobin : 26.8 pg  Mean Cell Hemoglobin Concentration : 29.6 gm/dL  Auto Neutrophil # : 13.64 K/uL  Auto Lymphocyte # : 0.08 K/uL  Auto Monocyte # : 0.51 K/uL  Auto Eosinophil # : 0.56 K/uL  Auto Basophil # : 0.05 K/uL  Auto Neutrophil % : 91.4 %  Auto Lymphocyte % : 0.5 %  Auto Monocyte % : 3.4 %  Auto Eosinophil % : 3.7 %  Auto Basophil % : 0.3 %    04-16    135  |  94<L>  |  66<H>  ----------------------------<  54<LL>  5.1   |  26  |  2.81<H>  04-15    135  |  95<L>  |  65<H>  ----------------------------<  111<H>  4.3   |  20<L>  |  2.63<H>    Ca    8.4      16 Apr 2024 06:47  Ca    8.9      15 Apr 2024 23:15  Phos  3.5     04-16  Mg     2.1     04-16    TPro  6.5  /  Alb  3.1<L>  /  TBili  1.0  /  DBili  x   /  AST  28  /  ALT  8<L>  /  AlkPhos  45  04-16  TPro  7.7  /  Alb  3.6  /  TBili  0.9  /  DBili  x   /  AST  23  /  ALT  9<L>  /  AlkPhos  71  04-15    Tele: NSR, 1st degree 70-100bpm     TTE:  TRANSTHORACIC ECHOCARDIOGRAM REPORT    Pt. Name:       EVELYN LOPEZ Study Date:    4/16/2024  MRN:            [id number]        YOB: 1950  Accession #:    [id number]        Age:           73 years  Account#:       [id number]     Gender:        F  Heart Rate:     84 bpm           Height:        70.00 in (177.80 cm)  Rhythm:                          Weight:        330.00 lb (149.69 kg)  Blood Pressure: 135/69 mmHg      BSA/BMI:       2.58 m² / 47.35 kg/m²  ________________________________________________________________________________________  Referring Physician:    [id number] Anitra Morgan  Interpreting Physician: Gibran Canela MD  Primary Sonographer:    Getachew Watts [facility]    CPT:               ECHO TTE W/O CON F/U [facility] - 42397.m;LIMITED SPECTRAL - 17272.m  Indication(s):     Cardiac murmur, unspecified - R01.1  Procedure:         Limited transthoracic echocardiogram.  Ordering Location: [facility], [place]  Admission Status:  Inpatient  Study Information: Image quality for this study is fair.     CONCLUSIONS:      1. Limited study to evaluate the aortic valve.   2. Severe aortic stenosis.    FINDINGS:     Aortic Valve:  There is severe aortic stenosis. The peak transaortic velocity is 4.29 m/s, peak transaortic gradient is 73.6 mmHg and mean transaortic gradient is 43.0 mmHg with an LVOT/aortic valve VTI ratio of 0.21. The aortic valve area is estimated at 0.93 cm² by the continuity equation. There is trace aortic regurgitation.  ____________________________________________________________________  QUANTITATIVE DATA:     LVOT / RVOT/ Qp/Qs Data: (Indexed to BSA)  LVOT Diameter:  2.40 cm  LVOT Area:      4.52 cm²  LVOT Vmax:      0.90 m/s  LVOT Vmn:       0.635 m/s  LVOT VTI:       21.20 cm  LVOT peak grad: 3 mmHg  LVOT mean grad: 11.7 mmHg  LVOT SV:        95.9 ml   37.14 ml/m²    Aortic Valve Measurements:  AV Vmax:                4.3 m/s  AV Peak Gradient:       73.6 mmHg  AV Mean Gradient:       43.0 mmHg  AV VTI:                 103.0 cm  AV VTI Ratio:           0.21  AoV EOA, Contin:        0.93 cm²  AoV EOA, Contin i:      0.36 cm²/m²  AoV Dimensionless Index 0.21  ______________________________________________________________________________  Electronically signed on 4/16/2024 at 6:27:46 PM by Gibran Canela MD       *** Final ***

## 2024-04-17 NOTE — PROGRESS NOTE ADULT - PROBLEM SELECTOR PLAN 8
home lantus 33u qhs    - Decreased Lantus to 12u QHS iso poor PO intake and hypoglycemia    - QUE, FSG ACHS or q6h if NPO; goal glucose   - CTM Dexcom for glucose monitoring

## 2024-04-17 NOTE — DISCHARGE NOTE PROVIDER - CARE PROVIDERS DIRECT ADDRESSES
[email or web address]

## 2024-04-17 NOTE — PROGRESS NOTE ADULT - ASSESSMENT
ASSESSMENT AND PLAN:  #dermatitis, hypersensitivity reaction vs early AGEP  Agree with hypersensitivity reaction to contrast, however this level of nausea and vomiting is not seen with drug hypersensitivity reactions  - steroids would not be recommended from our perspective for this hypersensitivity reaction at this point in time without further workup of possible infectious etiology of her nausea and vomiting  - will closely monitor to see if rash develops into AGEP from cefepime or vanc, which could explain her fevers  - While we can see kidney failure in the context of a drug rash, hers would be too quick and might be from the contrast itself  - for the intertriginous areas please cont nystatin cream BID mixed with triamcinolone 0.1% cream BID    #psoriasis  - for the psoriasis please – CONT  triamcinolone 0.1% ointment BID to affected areas (never to the face/groin/skin folds) for up to 2 weeks on, then 1 week off. Please dispense multiple tubes to cover body surface area.  - in the future as an outpatient, can consider systemic such as otezla or for weight loss     #lymphedema  - agree with wound care, compression, elevation recommended    #dermatitis favor oral candidiasis but atypical  - white plaques in mouth  - f/u fungal culture  - please CONT nystatin swish and swallow 4x/day    The patient's chart was reviewed in addition to being seen and examined at bedside with the dermatology attending Dr. Shelby.  Recommendations were communicated with the primary team.    Please page [phone] with a 10-digit call-back number for further related questions.  Please re-consult Dermatology for any new or worsening developments.    Zeyad Dc MD  Resident Physician, PGY-2  [facility]  Pager: [phone]  Office: [phone] ASSESSMENT AND PLAN:  #dermatitis, hypersensitivity reaction vs early AGEP  Agree with hypersensitivity reaction to contrast, however this level of nausea and vomiting is not seen with drug hypersensitivity reactions  - steroids would not be recommended from our perspective for this hypersensitivity reaction at this point in time without further workup of possible infectious etiology of her nausea and vomiting  - will closely monitor to see if rash develops into AGEP from cefepime or vanc, which could explain her fevers  - While we can see kidney failure in the context of a drug rash, hers would be too quick and might be from the contrast itself  - for the intertriginous areas please cont nystatin cream BID mixed with triamcinolone 0.1% cream BID  - biopsy as below, woundcare instructions below in procedure note    #psoriasis  - for the psoriasis please – CONT  triamcinolone 0.1% ointment BID to affected areas (never to the face/groin/skin folds) for up to 2 weeks on, then 1 week off. Please dispense multiple tubes to cover body surface area.  - in the future as an outpatient, can consider systemic such as otezla or for weight loss     #dermatitis favor oral candidiasis but atypical  - white plaques in mouth  - f/u fungal culture  - please CONT nystatin swish and swallow 4x/day    #lymphedema  - agree with wound care, compression, elevation recommended    Dermatology Punch Biopsy Procedure Note    After risks and benefits of procedure including bleeding, infection and scar were reviewed (consents including photo consent reviewed, signed and in chart), allergies were reviewed and time out performed. Written consent given by the patient.    Area cleaned with rubbing alcohol and anesthetized with lidocaine and epinephrine.  A 4mm punch biopsy was performed to L chest, hemostasis achieved with a single dissolvable chromic gut suture with pressure dressing placed.   Wound care reviewed with patient and team. Biopsy site should remain covered with pressure bandage for 24-48 hours. Then apply Vaseline to biopsy site daily and cover with bandage until healed.    The patient's chart was reviewed in addition to being seen and examined at bedside with the dermatology attending Dr. Shelby.  Recommendations were communicated with the primary team.    Please page [phone] with a 10-digit call-back number for further related questions.  Please re-consult Dermatology for any new or worsening developments.    Zeyad Dc MD  Resident Physician, PGY-2  [facility]  Pager: [phone]  Office: [phone]

## 2024-04-18 NOTE — PROGRESS NOTE ADULT - PROBLEM SELECTOR PLAN 6
CTA CAP incidentally noted for L adrenal thickened w a 1.2 x 0.8 cm left adrenal nodule is seen.    - endo consulted; recs appreciated  - f/u metanephrine, aldosterone, renin  - dexamethasone administered 4/17 10PM, f/u 8AM serum cortisol CTA CAP incidentally noted for L adrenal thickened w a 1.2 x 0.8 cm left adrenal nodule is seen.    - endo consulted; recs appreciated  - f/u metanephrine, aldosterone, renin  - 1mg IV dexamethasone administered 4/17 10PM, f/u 8AM serum cortisol

## 2024-04-18 NOTE — PROGRESS NOTE ADULT - SUBJECTIVE AND OBJECTIVE BOX
[facility] - Howard Kimble, Carlos Luong, Herman Alston  Office Number:  [phone]    Patient resting comfortably in bed in NAD.  Laying flat with no respiratory distress.  No complaints of chest pain, dyspnea, palpitations, PND, or orthopnea.  Remains on 3L NC (on 4L at home)    ROS: negative unless otherwise mentioned.    Telemetry: SR, PACs, junctional    MEDICATIONS  (STANDING):  ascorbic acid 500 milliGRAM(s) Oral daily  aspirin  chewable 81 milliGRAM(s) Oral daily  atorvastatin 80 milliGRAM(s) Oral at bedtime  bisacodyl Suppository 10 milliGRAM(s) Rectal once  buMETAnide Infusion 0.5 mG/Hr (2.5 mL/Hr) IV Continuous <Continuous>  calamine/zinc oxide Lotion 1 Application(s) Topical three times a day  cefepime   IVPB 1000 milliGRAM(s) IV Intermittent every 24 hours  dextrose 10% Bolus 125 milliLiter(s) IV Bolus once  dextrose 5%. 1000 milliLiter(s) (100 mL/Hr) IV Continuous <Continuous>  dextrose 5%. 1000 milliLiter(s) (50 mL/Hr) IV Continuous <Continuous>  dextrose 50% Injectable 12.5 Gram(s) IV Push once  dextrose 50% Injectable 25 Gram(s) IV Push once  ferrous    sulfate 325 milliGRAM(s) Oral two times a day  glucagon  Injectable 1 milliGRAM(s) IntraMuscular once  heparin   Injectable 5000 Unit(s) SubCutaneous every 8 hours  insulin glargine Injectable (LANTUS) 12 Unit(s) SubCutaneous at bedtime  insulin lispro (ADMELOG) corrective regimen sliding scale   SubCutaneous three times a day before meals  insulin lispro (ADMELOG) corrective regimen sliding scale   SubCutaneous at bedtime  lactated ringers. 500 milliLiter(s) (100 mL/Hr) IV Continuous <Continuous>  levothyroxine 75 MICROGram(s) Oral daily  loratadine Syrup 10 milliGRAM(s) Oral daily  multivitamin 1 Tablet(s) Oral daily  mupirocin 2% Nasal 1 Application(s) Both Nostrils two times a day  nystatin    Suspension 324853 Unit(s) Oral four times a day  nystatin/triamcinolone Cream 1 Application(s) Topical every 12 hours  pantoprazole    Tablet 40 milliGRAM(s) Oral before breakfast  polyethylene glycol 3350 17 Gram(s) Oral two times a day  senna 2 Tablet(s) Oral at bedtime  triamcinolone 0.1% Ointment 1 Application(s) Topical two times a day    MEDICATIONS  (PRN):  dextrose Oral Gel 15 Gram(s) Oral once PRN Blood Glucose LESS THAN 70 milliGRAM(s)/deciliter  diphenhydrAMINE Injectable 50 milliGRAM(s) IV Push every 4 hours PRN Rash and/or Itching  metoclopramide Injectable 10 milliGRAM(s) IV Push every 6 hours PRN nausea vomiting      Allergies    contrast media (iodine-based) (Fever; Vomiting; Flushing; Hypotension; Rash; Stomach Upset)  Ativan (Rash; Urticaria)  penicillins (Hives (Mod to Severe); Anaphylaxis (Mod to Severe); Short breath (Mod to Severe); Angioedema (Mod to Severe))    Intolerances        Vital Signs Last 24 Hrs  T(C): 36.9 (18 Apr 2024 04:14), Max: 36.9 (17 Apr 2024 12:21)  T(F): 98.5 (18 Apr 2024 04:14), Max: 98.5 (18 Apr 2024 04:14)  HR: 82 (18 Apr 2024 04:14) (74 - 99)  BP: 106/56 (18 Apr 2024 04:14) (92/56 - 106/66)  BP(mean): 79 (17 Apr 2024 12:21) (79 - 79)  RR: 18 (18 Apr 2024 04:14) (17 - 18)  SpO2: 97% (18 Apr 2024 04:14) (93% - 100%)    Parameters below as of 18 Apr 2024 04:14  Patient On (Oxygen Delivery Method): nasal cannula  O2 Flow (L/min): 3      I&O's Summary    17 Apr 2024 07:01  -  18 Apr 2024 07:00  --------------------------------------------------------  IN: 1272.5 mL / OUT: 350 mL / NET: 922.5 mL        ON EXAM:    General: NAD, awake and alert, oriented x 3, obese  HEENT: Mucous membranes are moist, anicteric  Lungs: Non-labored, breath sounds are decreased bilaterally, No wheezing, rales or rhonchi  Cardiovascular: Regular, S1 and S2, +SM at ru/lusb  Gastrointestinal: Bowel Sounds present, soft, nontender.   Lymph: chronic moderate peripheral edema with venous changes and rash.   Skin: rash on legs  Psych:  Mood & affect appropriate    LABS: All Labs Reviewed:                        11.5   12.90 )-----------( 159      ( 17 Apr 2024 11:00 )             37.0                         12.6   14.94 )-----------( 220      ( 16 Apr 2024 09:50 )             42.6                         11.9   8.51  )-----------( 203      ( 16 Apr 2024 00:32 )             38.5     17 Apr 2024 11:00    133    |  91     |  77     ----------------------------<  121    5.0     |  30     |  3.62   16 Apr 2024 06:47    135    |  94     |  66     ----------------------------<  54     5.1     |  26     |  2.81   15 Apr 2024 23:15    135    |  95     |  65     ----------------------------<  111    4.3     |  20     |  2.63     Ca    8.6        17 Apr 2024 11:00  Ca    8.4        16 Apr 2024 06:47  Ca    8.9        15 Apr 2024 23:15  Phos  3.6       17 Apr 2024 11:00  Phos  3.5       16 Apr 2024 06:47  Mg     2.1       17 Apr 2024 11:00  Mg     2.1       16 Apr 2024 06:47    TPro  6.3    /  Alb  2.9    /  TBili  1.1    /  DBili  x      /  AST  14     /  ALT  7      /  AlkPhos  47     17 Apr 2024 11:00  TPro  6.5    /  Alb  3.1    /  TBili  1.0    /  DBili  x      /  AST  28     /  ALT  8      /  AlkPhos  45     16 Apr 2024 06:47  TPro  7.7    /  Alb  3.6    /  TBili  0.9    /  DBili  x      /  AST  23     /  ALT  9      /  AlkPhos  71     15 Apr 2024 23:15          Blood Culture: Organism --  Gram Stain Blood -- Gram Stain --  Specimen Source .Tissue  Culture-Blood --    Organism --  Gram Stain Blood -- Gram Stain --  Specimen Source Clean Catch Clean Catch (Midstream)  Culture-Blood --    Organism --  Gram Stain Blood -- Gram Stain --  Specimen Source .Blood Blood-Peripheral  Culture-Blood --    Organism --  Gram Stain Blood -- Gram Stain --  Specimen Source .Blood Blood-Peripheral  Culture-Blood --        TTE 4/1/24:  CONCLUSIONS:   1. Technically difficult image quality.   2. Left ventricular systolic function is moderately to severely decreased with an ejection fraction of 30 % by Nunes's method of disks.   3. Mildto moderate left ventricular hypertrophy.   4. The right ventricle is not well visualized. Based on visual assessment, the right ventricle appears mildly enlarged. mildly reduced systolic function.   5. The left atrium is mildly dilated.   6. Trace aortic regurgitation.   7. Mild mitral regurgitation.   8. Moderate to severe aortic stenosis. There is a Vmax of 3.66m/s, Mean gradient of 26mmHg and a DI of 0.19. The JUAN DANIEL is calculated as 0.61cm2 by continuity. This likely represents low flow low gradient AS.   9. Estimated pulmonary artery systolic pressure is 59 mmHg, consistent with moderate pulmonary hypertension.  10. The inferior vena cava is dilated measuring 2.60 cm in diameter, (dilated >2.1cm) with normal inspiratory collapse (normal >50%) consistent with mildly elevated right atrial pressure (~8, range 5-10mmHg).

## 2024-04-18 NOTE — PROVIDER CONTACT NOTE (OTHER) - ASSESSMENT
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

## 2024-04-18 NOTE — PROGRESS NOTE ADULT - NUTRITIONAL ASSESSMENT
This patient has been assessed with a concern for Malnutrition and has been determined to have a diagnosis/diagnoses of Morbid obesity (BMI > 40).    This patient is being managed with:   Diet Clear Liquid-  1500mL Fluid Restriction ([id number])  Entered: Apr 17 2024 10:14AM

## 2024-04-18 NOTE — PROGRESS NOTE ADULT - PROBLEM SELECTOR PLAN 4
TTE (4/2024) LVEF 30% w mod LVH, mildly reduced RV, AS (0.61 cm2), mod pHTN.   On home Torsemide 40 mg daily, Toprol  mg daily, Farxiga 5mg daily. Follows w Dr. Cherise rosas eventual plan to start further GDMT as renally tolerated  proBNP 51K from 80k.     - strict I/Os, daily weights, fluid restrict 1.5L/day  - dc'd home torsemide 40mg daily  - Restarted bumex gtt, pt is now anuric

## 2024-04-18 NOTE — PROGRESS NOTE ADULT - PROBLEM SELECTOR PLAN 11
Hospital Bundle    Fluids: FLUID RESTRICT 1.5L/day  Electrolytes: Replete K > 4, Mg > 2, Phos > 3  Nutrition: Diet: Clear Liquid 1.5L FLUID RESTRICT iso nausea  PPX  ---VTE: HSQ  ---GI: c/w PPI  ---Resp: c/w home O2  Access: PIV  Code Status: pending further discussions  Dispo: pending clinical improvement

## 2024-04-18 NOTE — PROGRESS NOTE ADULT - SUBJECTIVE AND OBJECTIVE BOX
infectious diseases progress note:    Patient is a 73y old  Female who presents with a chief complaint of Syncope (18 Apr 2024 07:19)        Syncope and collapse           Allergies    contrast media (iodine-based) (Fever; Vomiting; Flushing; Hypotension; Rash; Stomach Upset)  Ativan (Rash; Urticaria)  penicillins (Hives (Mod to Severe); Anaphylaxis (Mod to Severe); Short breath (Mod to Severe); Angioedema (Mod to Severe))    Intolerances        ANTIBIOTICS/RELEVANT:  antimicrobials  cefepime   IVPB 1000 milliGRAM(s) IV Intermittent every 24 hours  nystatin    Suspension 068080 Unit(s) Oral four times a day    immunologic:    OTHER:  ascorbic acid 500 milliGRAM(s) Oral daily  aspirin  chewable 81 milliGRAM(s) Oral daily  atorvastatin 80 milliGRAM(s) Oral at bedtime  bisacodyl Suppository 10 milliGRAM(s) Rectal once  buMETAnide Infusion 0.5 mG/Hr IV Continuous <Continuous>  calamine/zinc oxide Lotion 1 Application(s) Topical three times a day  dextrose 10% Bolus 125 milliLiter(s) IV Bolus once  dextrose 5%. 1000 milliLiter(s) IV Continuous <Continuous>  dextrose 5%. 1000 milliLiter(s) IV Continuous <Continuous>  dextrose 50% Injectable 12.5 Gram(s) IV Push once  dextrose 50% Injectable 25 Gram(s) IV Push once  dextrose Oral Gel 15 Gram(s) Oral once PRN  diphenhydrAMINE Injectable 50 milliGRAM(s) IV Push every 4 hours PRN  ferrous    sulfate 325 milliGRAM(s) Oral two times a day  glucagon  Injectable 1 milliGRAM(s) IntraMuscular once  heparin   Injectable 5000 Unit(s) SubCutaneous every 8 hours  insulin glargine Injectable (LANTUS) 12 Unit(s) SubCutaneous at bedtime  insulin lispro (ADMELOG) corrective regimen sliding scale   SubCutaneous at bedtime  insulin lispro (ADMELOG) corrective regimen sliding scale   SubCutaneous three times a day before meals  lactated ringers. 500 milliLiter(s) IV Continuous <Continuous>  levothyroxine 75 MICROGram(s) Oral daily  loratadine Syrup 10 milliGRAM(s) Oral daily  metoclopramide Injectable 10 milliGRAM(s) IV Push every 6 hours PRN  multivitamin 1 Tablet(s) Oral daily  mupirocin 2% Nasal 1 Application(s) Both Nostrils two times a day  nystatin/triamcinolone Cream 1 Application(s) Topical every 12 hours  pantoprazole    Tablet 40 milliGRAM(s) Oral before breakfast  polyethylene glycol 3350 17 Gram(s) Oral two times a day  senna 2 Tablet(s) Oral at bedtime  triamcinolone 0.1% Ointment 1 Application(s) Topical two times a day      Objective:  Vital Signs Last 24 Hrs  T(C): 36.9 (18 Apr 2024 04:14), Max: 36.9 (17 Apr 2024 12:21)  T(F): 98.5 (18 Apr 2024 04:14), Max: 98.5 (18 Apr 2024 04:14)  HR: 82 (18 Apr 2024 04:14) (74 - 99)  BP: 106/56 (18 Apr 2024 04:14) (92/56 - 106/66)  BP(mean): 79 (17 Apr 2024 12:21) (79 - 79)  RR: 18 (18 Apr 2024 04:14) (17 - 18)  SpO2: 97% (18 Apr 2024 04:14) (93% - 100%)    Parameters below as of 18 Apr 2024 04:14  Patient On (Oxygen Delivery Method): nasal cannula  O2 Flow (L/min): 3         Eyes:YOKO, EOMI  Ear/Nose/Throat: no oral lesion, no sinus tenderness on percussion	  Neck:no JVD, no lymphadenopathy, supple  Respiratory: CTA rome  Cardiovascular: S1S2 RRR, no murmurs  Gastrointestinal:soft, (+) BS, no HSM  Extremities:no e/e/c        LABS:                        11.5   12.90 )-----------( 159      ( 17 Apr 2024 11:00 )             37.0     04-17    133<L>  |  91<L>  |  77<H>  ----------------------------<  121<H>  5.0   |  30  |  3.62<H>    Ca    8.6      17 Apr 2024 11:00  Phos  3.6     04-17  Mg     2.1     04-17    TPro  6.3  /  Alb  2.9<L>  /  TBili  1.1  /  DBili  x   /  AST  14  /  ALT  7<L>  /  AlkPhos  47  04-17      Urinalysis Basic - ( 17 Apr 2024 11:00 )    Color: x / Appearance: x / SG: x / pH: x  Gluc: 121 mg/dL / Ketone: x  / Bili: x / Urobili: x   Blood: x / Protein: x / Nitrite: x   Leuk Esterase: x / RBC: x / WBC x   Sq Epi: x / Non Sq Epi: x / Bacteria: x          MICROBIOLOGY:    RECENT CULTURES:  04-16 @ 19:11 .Tissue                Testing in progress    04-16 @ 14:57 Clean Catch Clean Catch (Midstream)                10,000 - 49,000 CFU/mL Escherichia coli    04-16 @ 00:28 .Blood Blood-Peripheral                No growth at 48 Hours    04-16 @ 00:20 .Blood Blood-Peripheral                No growth at 48 Hours          RESPIRATORY CULTURES:              RADIOLOGY & ADDITIONAL STUDIES:        Pager [phone]  After 5 pm/weekends or if no response :[phone]

## 2024-04-18 NOTE — PROGRESS NOTE ADULT - ASSESSMENT
73 year old with  LELIA  DM  CKD  ADHF  recently discharged after treatment for CHF.  admitted for sob  pt developed a diffuse rash all over body after receiving  CT contrast   low grade levels diffuse  no pna    suspect that this as all anallergic reaction to contrast  media. rash that is pustular in some locations  no MM involvement     no contraindication to steroids   dc antibiotics  would  check   Baseline QuantiFeron

## 2024-04-18 NOTE — PROGRESS NOTE ADULT - ATTENDING COMMENTS
Agree with assessment and plan as above by Dr. Astudillo. Reviewed all pertinent labs, glucose values, and imaging studies. Modifications made as indicated above. Evaluating this patient for Type 1 DM w/ hyperglycemia now exacerbated by dexamethasone given last night for dex suppression test. Recommend add prandial insulin as above. Awaiting workup for adrenal nodule with DHEA-S, renin, aldosterone, plasma metanephrines, cortisol level, and dex level. Further adrenal workup can be done as outpatient if needed. Monitor on home dose of levothyroxine and repeat TFTs as outpatient.     Tay Ross D.O  [phone]

## 2024-04-18 NOTE — PROGRESS NOTE ADULT - SUBJECTIVE AND OBJECTIVE BOX
INTERVAL HPI/OVERNIGHT EVENTS:  redness spreading down body now, less nausea     MEDICATIONS  (STANDING):  ascorbic acid 500 milliGRAM(s) Oral daily  aspirin  chewable 81 milliGRAM(s) Oral daily  atorvastatin 80 milliGRAM(s) Oral at bedtime  buMETAnide Infusion 0.5 mG/Hr (2.5 mL/Hr) IV Continuous <Continuous>  calamine/zinc oxide Lotion 1 Application(s) Topical three times a day  cetirizine 10 milliGRAM(s) Oral daily  chlorhexidine 4% Liquid 1 Application(s) Topical <User Schedule>  dextrose 10% Bolus 125 milliLiter(s) IV Bolus once  dextrose 5%. 1000 milliLiter(s) (100 mL/Hr) IV Continuous <Continuous>  dextrose 5%. 1000 milliLiter(s) (50 mL/Hr) IV Continuous <Continuous>  dextrose 50% Injectable 12.5 Gram(s) IV Push once  dextrose 50% Injectable 25 Gram(s) IV Push once  diphenhydrAMINE Injectable 50 milliGRAM(s) IntraMuscular every 6 hours  ferrous    sulfate 325 milliGRAM(s) Oral two times a day  glucagon  Injectable 1 milliGRAM(s) IntraMuscular once  heparin   Injectable 5000 Unit(s) SubCutaneous every 8 hours  insulin glargine Injectable (LANTUS) 12 Unit(s) SubCutaneous at bedtime  insulin lispro (ADMELOG) corrective regimen sliding scale   SubCutaneous at bedtime  insulin lispro (ADMELOG) corrective regimen sliding scale   SubCutaneous three times a day before meals  insulin lispro Injectable (ADMELOG) 2 Unit(s) SubCutaneous three times a day before meals  lactated ringers. 500 milliLiter(s) (100 mL/Hr) IV Continuous <Continuous>  levothyroxine 75 MICROGram(s) Oral daily  multivitamin 1 Tablet(s) Oral daily  mupirocin 2% Nasal 1 Application(s) Both Nostrils two times a day  nystatin    Suspension [id number] Unit(s) Oral four times a day  nystatin/triamcinolone Cream 1 Application(s) Topical every 12 hours  petrolatum white Ointment 1 Application(s) Topical three times a day  polyethylene glycol 3350 17 Gram(s) Oral two times a day  senna 2 Tablet(s) Oral at bedtime  triamcinolone 0.1% Ointment 1 Application(s) Topical two times a day    MEDICATIONS  (PRN):  dextrose Oral Gel 15 Gram(s) Oral once PRN Blood Glucose LESS THAN 70 milliGRAM(s)/deciliter  metoclopramide Injectable 10 milliGRAM(s) IV Push every 6 hours PRN nausea vomiting  sodium chloride 0.9% lock flush 10 milliLiter(s) IV Push every 1 hour PRN Pre/post blood products, medications, blood draw, and to maintain line patency      Allergies    contrast media (iodine-based) (Fever; Vomiting; Flushing; Hypotension; Rash; Stomach Upset)  Ativan (Rash; Urticaria)  penicillins (Hives (Mod to Severe); Anaphylaxis (Mod to Severe); Short breath (Mod to Severe); Angioedema (Mod to Severe))    Intolerances      Vital Signs Last 24 Hrs  T(C): 36.8 (18 Apr 2024 17:16), Max: 37.1 (18 Apr 2024 11:27)  T(F): 98.2 (18 Apr 2024 17:16), Max: 98.8 (18 Apr 2024 11:27)  HR: 82 (18 Apr 2024 17:16) (82 - 99)  BP: 92/58 (18 Apr 2024 17:16) (92/56 - 125/69)  BP(mean): --  RR: 18 (18 Apr 2024 17:16) (17 - 18)  SpO2: 98% (18 Apr 2024 17:16) (93% - 100%)    Parameters below as of 18 Apr 2024 17:16  Patient On (Oxygen Delivery Method): nasal cannula  O2 Flow (L/min): 3        PHYSICAL EXAM:    The patient was in NAD.  OP showed no ulcerations.  There was no visible LAD.  Conjunctiva were non-injected.  There was no clubbing or edema of extremities.  The scalp, hair, face, eyebrows, lips, OP, neck, chest, back, extremities x4, and nails were examined.  There was no hyperhidrosis or bromhidrosis.    Of note on skin exam:  pink plaques with micaceous scale on trunk and extremities  Legs with erythematous circumferential patches and edema  full body erytheroderma  white plaques under tongue    LABS:                        9.9    9.30  )-----------( 129      ( 18 Apr 2024 09:21 )             33.1     04-18    130<L>  |  88<L>  |  94<H>  ----------------------------<  239<H>  5.0   |  27  |  4.55<H>    Ca    8.9      18 Apr 2024 16:42  Phos  4.2     04-18  Mg     2.3     04-18    TPro  6.0  /  Alb  2.8<L>  /  TBili  1.1  /  DBili  x   /  AST  9<L>  /  ALT  9<L>  /  AlkPhos  50  04-18      Urinalysis Basic - ( 18 Apr 2024 16:42 )    Color: x / Appearance: x / SG: x / pH: x  Gluc: 239 mg/dL / Ketone: x  / Bili: x / Urobili: x   Blood: x / Protein: x / Nitrite: x   Leuk Esterase: x / RBC: x / WBC x   Sq Epi: x / Non Sq Epi: x / Bacteria: x        RADIOLOGY & ADDITIONAL TESTS: reviewed; no pertinent findings

## 2024-04-18 NOTE — PROGRESS NOTE ADULT - SUBJECTIVE AND OBJECTIVE BOX
*****Structural Heart Team*****    Subjective:    Ms. Chowdhury is resting comfortably in bed, stating she feels a little better than she did yesterday.    PAST MEDICAL & SURGICAL HISTORY:  Hypertension    Diabetes    Lymphedema    H/O left bundle branch block    History of left bundle branch block (LBBB)    Systolic heart failure, chronic    Type 1 diabetes    H/O cataract  2020    History of surgical removal of meniscus of knee  left in 1971    Frozen shoulder  2000    H/O Achilles tendon repair  lengthened bilaterally, 2000    Fractured skull  1968          T(C): 37.1 (04-18-24 @ 11:27), Max: 37.1 (04-18-24 @ 11:27)  HR: 98 (04-18-24 @ 11:27) (82 - 99)  BP: 125/69 (04-18-24 @ 11:27) (92/56 - 125/69)  RR: 18 (04-18-24 @ 11:27) (17 - 18)  SpO2: 98% (04-18-24 @ 11:27) (93% - 100%)  Wt(kg): --  04-17 @ 07:01  -  04-18 @ 07:00  --------------------------------------------------------  IN: 1272.5 mL / OUT: 350 mL / NET: 922.5 mL      MEDICATIONS  (STANDING):  ascorbic acid 500 milliGRAM(s) Oral daily  aspirin  chewable 81 milliGRAM(s) Oral daily  atorvastatin 80 milliGRAM(s) Oral at bedtime  bisacodyl Suppository 10 milliGRAM(s) Rectal once  buMETAnide Infusion 0.5 mG/Hr (2.5 mL/Hr) IV Continuous <Continuous>  calamine/zinc oxide Lotion 1 Application(s) Topical three times a day  cetirizine 10 milliGRAM(s) Oral daily  diphenhydrAMINE Injectable 50 milliGRAM(s) IntraMuscular every 6 hours  ferrous    sulfate 325 milliGRAM(s) Oral two times a day  glucagon  Injectable 1 milliGRAM(s) IntraMuscular once  heparin   Injectable 5000 Unit(s) SubCutaneous every 8 hours  insulin glargine Injectable (LANTUS) 12 Unit(s) SubCutaneous at bedtime  insulin lispro (ADMELOG) corrective regimen sliding scale   SubCutaneous three times a day before meals  insulin lispro (ADMELOG) corrective regimen sliding scale   SubCutaneous at bedtime  lactated ringers. 500 milliLiter(s) (100 mL/Hr) IV Continuous <Continuous>  levothyroxine 75 MICROGram(s) Oral daily  multivitamin 1 Tablet(s) Oral daily  mupirocin 2% Nasal 1 Application(s) Both Nostrils two times a day  nystatin    Suspension 409639 Unit(s) Oral four times a day  nystatin/triamcinolone Cream 1 Application(s) Topical every 12 hours  pantoprazole    Tablet 40 milliGRAM(s) Oral before breakfast  polyethylene glycol 3350 17 Gram(s) Oral two times a day  senna 2 Tablet(s) Oral at bedtime  triamcinolone 0.1% Ointment 1 Application(s) Topical two times a day    MEDICATIONS  (PRN):  dextrose Oral Gel 15 Gram(s) Oral once PRN Blood Glucose LESS THAN 70 milliGRAM(s)/deciliter  metoclopramide Injectable 10 milliGRAM(s) IV Push every 6 hours PRN nausea vomiting  sodium chloride 0.9% lock flush 10 milliLiter(s) IV Push every 1 hour PRN Pre/post blood products, medications, blood draw, and to maintain line patency      Review of Symptoms:  Constitutional: Awake, Alert, Follows commands  Respiratory: Currently Denies  Cardiac: Denies CP, Denies Palpitations  Gastrointestinal: Denies Pain, Denies N/V, tolerating po intake  Vascular: Tunneled HD Catheterization  Extremities: + Edema, No joint pain or swelling  Neurological: Negative  Endocrine: No heat or cold intolerance, No excessive thirst  Heme/Onc: Negative    Exam:  General: A/Ox3, DOMINIC, NAD  HEENT: Supple, No JVD, Trachea midline, no masses  Pulmonary: CTAB, = Chest Excursion, no accessory muscle use  Cor: S1S2, RRR, II/VI STEVE  ECG: SR  Gastrointestinal: Soft, NT/ND, + Bowel Sounds  Neuro: = motor and sensory B/L, No focal deficits  Vascular: + Edema  Extremities: No Joint pain or swelling  Skin: LE Psoriatric plaques, currently wrapped                          9.9    9.30  )-----------( 129      ( 18 Apr 2024 09:21 )             33.1   04-18    128<L>  |  89<L>  |  88<H>  ----------------------------<  228<H>  5.5<H>   |  26  |  4.25<H>    Ca    8.6      18 Apr 2024 09:24  Phos  4.2     04-18  Mg     2.2     04-18    TPro  6.0  /  Alb  2.8<L>  /  TBili  1.1  /  DBili  x   /  AST  9<L>  /  ALT  9<L>  /  AlkPhos  50  04-18      Imaging Reviewed:    Echocardiogram:      < from: TTE W or WO Ultrasound Enhancing Agent (04.16.24 @ 15:35) >  Aortic Valve:  There is severe aortic stenosis. The peak transaortic velocity is 4.29 m/s, peak transaortic gradient is 73.6 mmHg and mean transaortic gradient is 43.0 mmHg with an LVOT/aortic valve VTI ratio of 0.21. The aortic valve area is estimated at 0.93 cm² by the continuity equation. There is trace aortic regurgitation.  ____________________________________________________________________  QUANTITATIVE DATA:     LVOT / RVOT/ Qp/Qs Data: (Indexed to BSA)  LVOT Diameter:  2.40 cm  LVOT Area:      4.52 cm²  LVOT Vmax:      0.90 m/s  LVOT Vmn:       0.635 m/s  LVOT VTI:       21.20 cm  LVOT peak grad: 3 mmHg  LVOT mean grad: 11.7 mmHg  LVOT SV:        95.9 ml   37.14 ml/m²    Aortic Valve Measurements:  AV Vmax:                4.3 m/s  AV Peak Gradient:       73.6 mmHg  AV Mean Gradient:       43.0 mmHg  AV VTI:                 103.0 cm  AV VTI Ratio:           0.21  AoV EOA, Contin:        0.93 cm²  AoV EOA, Contin i:      0.36 cm²/m²  AoV Dimensionless Index 0.21      < end of copied text >    Cardiac Catheterization:        Assesment/Plan:  74 y/o female with Severe Aortic Stenosis, LBBB/ Bradycardia, Adrenal Nodules, Acute on Chronic Renal Injury.  1.) Severe Aortic Stenosis: Will discuss with Dr. Martell, but Cardiac Cath not likely needed. CT complete We will discuss tentative dates for TAVR next week after PPM placed.  2.) LBBB/Syncope: Patient tentatively scheduled for PPM this coming Monday. TAVR to be done afterwards  3.) Adrenal Lesions: Evaluation by Endocrinology appreciated. Workup  and any repeat imaging to be done will be directed by them, likely as an outpatient.  4.) Acute on Chronic Renal Injury: Continue to monitor Creatinine. Monitoring and RRT necessity to be determined by Dr. Merino.  5.) Rash: Patient apparently has generalized rash after IV contrast given. Would pre treat patient with Prednisone Protocol prior to any further IV Contrast administration  6.) OOB to chair.    Discussed [place]  [id number]

## 2024-04-18 NOTE — PROGRESS NOTE ADULT - PROBLEM SELECTOR PLAN 9
home lantus 33u qhs    - endocrine consulted; recs appreciated  - Decreased Lantus to 12u QHS iso poor PO intake and hypoglycemia    - QUE, FSG ACHS or q6h if NPO; goal glucose   - CTM Dexcom for glucose monitoring

## 2024-04-18 NOTE — CONSULT NOTE ADULT - ASSESSMENT
73F w HFrEF (EF 30%), mod AS, known LBBB, HTN, IDDM1, CKD, hypothyroidism, chronic lymphedema, suspected OHS/LELIA on 3L NC home, psoriasis, hx of penicillin allergy admitted for work up of suspected cardiogenic syncope. Patient received Iohexol (Omnipaque 350) for CT scan on 4/15 (HD 3). She developed flushing and rash, first noted around 10:45 pm 4/15. She was febrile and hypotensive at this time so she was given antibiotics, Vanc and cefepime, though infectious work up has bene largely negative. Seen by dermatology 4/16 and was found to have pustules inferior to right breast and flexural erythema, that has progressed to full body erythema with desquamation. Rash is itchy not painful and there is no mucosal membrane involvement. CBC notable for lymphopenia (lowest 4/16 80), neutrophilia (highest 4/16 13.64), and eosinophilia (highest 4/18 1050). Differential includes acute generalized exanthematous pustulosis (AGEP) from contrast media (given 4/15) and Grande Lonnie Syndrome (SJS) from pantoprazole (started 4/12). Rash started before antibiotics (vanc and cefepime) and oral nystatin (4/16)  were given, making them lower on the differential. Bumetenide started 4/14 and has also been reported with SJS but timing is sooner then would be expected. AGEP most likely diagnosis (rash timeline, appearance, neutrophilia) however given severity of sequelae of SJS, it is important to rule out. Will follow up biopsy results which will be helpful in making this distinction.  Recommend the following:  -Continue topical steroids. Use Vaseline to restore skin barrier   -Discontinue pantoprazole  -Should avoid iodinated contrast media (ICM). If patient needs contrast, team must do a risk benefit analysis. Would also wait for skin biopsy results to have better understanding of pathology. AGEP can recur if patient is re-exposed to the offending drug. If team decides to proceed with giving contrast, would recommend steroid premedication and using a different iodinated contrast. One case report reports negative skin testing to Iobitridol in patients who had positive skin testing to Iohexol. The same study reports readministration of ICM following a negative ICM skin test with no recurrence of AGEP in 4 patients. It is important to recognize the predictive value of skin tests in AGEP and DRESS are not known. Skin testing should not be done during acute episode so can not be done while inpatient.   -Patient should follow up in drug allergy clinic with Dr. Marmolejo, [address] [place] [address] [address], at discharge.   73F w HFrEF (EF 30%), mod AS, known LBBB, HTN, IDDM1, CKD, hypothyroidism, chronic lymphedema, suspected OHS/LELIA on 3L NC home, psoriasis, hx of penicillin allergy admitted for work up of suspected cardiogenic syncope. Patient received Iohexol (Omnipaque 350) for CT scan on 4/15 (HD 3). She developed flushing and rash, first noted around 10:45 pm 4/15, approximately 7 hours after contrast administration. She was febrile and hypotensive at this time so she was given antibiotics, Vanc and cefepime, though infectious work up has bene largely negative to date. Seen by dermatology 4/16 and was found to have pustules inferior to right breast and flexural erythema, that has progressed to full body erythema with desquamation. Rash is itchy not painful and there is no mucosal membrane involvement. CBC notable for lymphopenia (hugh of 80 on 4/16/24), neutrophilia (peak of 13.64 on 4/16), and eosinophilia (peak of 1050 on 4/18). Differential dx includes acute generalized exanthematous pustulosis (AGEP) from contrast media (given 4/15) and Grande Lonnie Syndrome (SJS) potentially from pantoprazole (started 4/12). Rash started prior to antibiotics (vanc and cefepime) and oral nystatin (4/16) adminstration, making them lower on the differential. Bumetenide started 4/14 and has also been reported with SJS but timing is sooner then would be expected. AGEP most likely diagnosis (rash timeline, appearance, neutrophilia) however given severity of sequelae of SJS, it is important to rule out. This seems less likely given the absence of any mucosal involvement, and the appearance of the rash; however, will follow up biopsy results which will be helpful in making this distinction.  Recommend the following:  -Continue topical steroids. Use Vaseline to restore skin barrier   -Discontinue pantoprazole if not essential  -Should avoid iodinated contrast media (ICM). If patient needs contrast, team must do a risk benefit analysis. Would also wait for skin biopsy results to have better understanding of pathology prior to repeat exposure of contrast. AGEP can recur if patient is re-exposed to the offending drug. If team decides to proceed with giving contrast, would recommend steroid premedication and using a different iodinated contrast. One case report notes negative skin testing to Iobitridol in patients who had positive skin testing to Iohexol. The same study reports readministration of ICM following a negative ICM skin test with no recurrence of AGEP in 4 patients. It is important to recognize that the predictive value of skin tests in AGEP and DRESS are not known. Skin testing should not be done during acute episode so can not be done while inpatient.   -Patient should follow up in drug allergy clinic with Dr. Marmolejo, [address] [place] [address] [address], at discharge.

## 2024-04-18 NOTE — PRE PROCEDURE NOTE - PRE PROCEDURE EVALUATION
Interventional Radiology    HPI: 73F w HFrEF (EF 30%), mod AS, known LBBB, HTN, IDDM1, CKD, hypothyroidism, chronic lymphedema, suspected OHS/LELIA on 3L NC home O2 p/w 1 episode of syncope. Recent admission at [facility] (3/29-4/5) for ADHF and DUNCAN s/p diuresis, discharged with new home O2 3L NC suspecting OHS/LELIA pending outpatient w/u. Since discharge a week ago, she has been staying at home with   Pt had sob walking to car. Once in car, she was still breathing heavily. Partner noticed pt was not responding to verbal stimuli for 10-15sec and witnessed R arm jerking. Pt gained consciousness quickly without postictal symptoms. Pt went to Cardiologist Dr. Nathan who recommended pt to come to ED. No fever, cp, abd pain, n/v. Leg swelling is improved from prior. Pt on torsemide 40mg which she has been taking. Pt was discharged on 3LNC which she is currently on. Patient reports she did not take Farxiga that she was discharged on as she was concerned about side effects.     In ED, patient was found afebrile, hemodynamically stable, breathing well on 3L NC. Initial labs notable for mild hyponatremia, DUNCAN on CKD, elevated proBNP and elevated pCO2 both improved from prior.      IR consulted for non-tunneled HD catheter placement given rise in Cr and elevated K.    Allergies:   contrast media (iodine-based) (Fever; Vomiting; Flushing; Hypotension; Rash; Stomach Upset)  Ativan (Rash; Urticaria)  penicillins (Hives (Mod to Severe); Anaphylaxis (Mod to Severe); Short breath (Mod to Severe); Angioedema (Mod to Severe))    Medications (Abx/Cardiac/Anticoagulation/Blood Products)  aspirin  chewable: 81 milliGRAM(s) Oral (04-16 @ 14:06)  buMETAnide Infusion: 2.5 mL/Hr IV Continuous (04-17 @ 17:29)  buMETAnide Injectable: 2 milliGRAM(s) IV Push (04-17 @ 11:24)  cefepime   IVPB: 100 mL/Hr IV Intermittent (04-17 @ 00:03)  cefepime   IVPB: 100 mL/Hr IV Intermittent (04-18 @ 00:33)  heparin   Injectable: 5000 Unit(s) SubCutaneous (04-18 @ 05:09)  nystatin    Suspension: 642105 Unit(s) Oral (04-18 @ 06:43)    Data:  T(C): 36.9  HR: 82  BP: 106/56  RR: 18  SpO2: 97%    Exam  General: No acute distress  Chest: Non labored breathing  Abdomen: Non-distended    -WBC 9.30 / HgB 9.9 / Hct 33.1 / Plt 129  -Na 128 / Cl 89 / BUN 88 / Glucose 228  -K 5.5 / CO2 26 / Cr 4.25  -ALT 9 / Alk Phos 50 / T.Bili 1.1    Plan: 73y Female presents for non-tunneled HD catheter placement.     -Risks/Benefits/alternatives explained with the patient and/or healthcare proxy and witnessed informed consent obtained.

## 2024-04-18 NOTE — PROGRESS NOTE ADULT - ATTENDING COMMENTS
Diffuse red rash, favor an agep like reaction to contrast vs pantoprazole. Fever curve and leukocytosis improved, this may have been related to drug rash however doubt Rosa Maria is  related. Treat w/ topical steroids

## 2024-04-18 NOTE — PROGRESS NOTE ADULT - PROBLEM SELECTOR PLAN 2
-Suspect cardiogenic given recent diuresis, addition of GDMT, pre-existing AS  -With arm shaking   -Orthostatics wnl, - CT Head wnl, carotid US wnl     Plan:  - ctm on telemetry  - Structural cardiology consulted, considering for TAVR

## 2024-04-18 NOTE — PROGRESS NOTE ADULT - SUBJECTIVE AND OBJECTIVE BOX
[facility]   [phone]  RENAL FOLLOW UP NOTE  --------------------------------------------------------------------------------  Chief Complaint:    24 hour events/subjective:    patient seen and examined.   oliguric    PAST HISTORY  --------------------------------------------------------------------------------  No significant changes to PMH, PSH, FHx, SHx, unless otherwise noted    ALLERGIES & MEDICATIONS  --------------------------------------------------------------------------------  Allergies    contrast media (iodine-based) (Fever; Vomiting; Flushing; Hypotension; Rash; Stomach Upset)  Ativan (Rash; Urticaria)  penicillins (Hives (Mod to Severe); Anaphylaxis (Mod to Severe); Short breath (Mod to Severe); Angioedema (Mod to Severe))    Intolerances      Standing Inpatient Medications  ascorbic acid 500 milliGRAM(s) Oral daily  aspirin  chewable 81 milliGRAM(s) Oral daily  atorvastatin 80 milliGRAM(s) Oral at bedtime  bisacodyl Suppository 10 milliGRAM(s) Rectal once  buMETAnide Infusion 0.5 mG/Hr IV Continuous <Continuous>  calamine/zinc oxide Lotion 1 Application(s) Topical three times a day  cetirizine 10 milliGRAM(s) Oral daily  chlorhexidine 4% Liquid 1 Application(s) Topical <User Schedule>  dextrose 10% Bolus 125 milliLiter(s) IV Bolus once  dextrose 5%. 1000 milliLiter(s) IV Continuous <Continuous>  dextrose 5%. 1000 milliLiter(s) IV Continuous <Continuous>  dextrose 50% Injectable 12.5 Gram(s) IV Push once  dextrose 50% Injectable 25 Gram(s) IV Push once  diphenhydrAMINE Injectable 50 milliGRAM(s) IntraMuscular every 6 hours  ferrous    sulfate 325 milliGRAM(s) Oral two times a day  glucagon  Injectable 1 milliGRAM(s) IntraMuscular once  heparin   Injectable 5000 Unit(s) SubCutaneous every 8 hours  insulin glargine Injectable (LANTUS) 12 Unit(s) SubCutaneous at bedtime  insulin lispro (ADMELOG) corrective regimen sliding scale   SubCutaneous three times a day before meals  insulin lispro (ADMELOG) corrective regimen sliding scale   SubCutaneous at bedtime  lactated ringers. 500 milliLiter(s) IV Continuous <Continuous>  levothyroxine 75 MICROGram(s) Oral daily  multivitamin 1 Tablet(s) Oral daily  mupirocin 2% Nasal 1 Application(s) Both Nostrils two times a day  nystatin    Suspension 360634 Unit(s) Oral four times a day  nystatin/triamcinolone Cream 1 Application(s) Topical every 12 hours  polyethylene glycol 3350 17 Gram(s) Oral two times a day  senna 2 Tablet(s) Oral at bedtime  triamcinolone 0.1% Ointment 1 Application(s) Topical two times a day    PRN Inpatient Medications  dextrose Oral Gel 15 Gram(s) Oral once PRN  metoclopramide Injectable 10 milliGRAM(s) IV Push every 6 hours PRN  sodium chloride 0.9% lock flush 10 milliLiter(s) IV Push every 1 hour PRN      REVIEW OF SYSTEMS  --------------------------------------------------------------------------------    Gen: denies  fevers/chills,  CVS: denies chest pain/palpitations  Resp: denies SOB/Cough  GI: Denies N/V/Abd pain  : +oliguria    VITALS/PHYSICAL EXAM  --------------------------------------------------------------------------------  T(C): 37.1 (04-18-24 @ 11:27), Max: 37.1 (04-18-24 @ 11:27)  HR: 98 (04-18-24 @ 11:27) (82 - 99)  BP: 125/69 (04-18-24 @ 11:27) (92/56 - 125/69)  RR: 18 (04-18-24 @ 11:27) (17 - 18)  SpO2: 98% (04-18-24 @ 11:27) (93% - 100%)  Wt(kg): --        04-17-24 @ 07:01  -  04-18-24 @ 07:00  --------------------------------------------------------  IN: 1272.5 mL / OUT: 350 mL / NET: 922.5 mL      Physical Exam:  	  	Gen: ill appearing, on O2, facial erythema, neck and arm erythema,   	Pulm: decrease bs  no rales or ronchi or wheezing  	CV: No JVD. RRR, S1S2; no rub II/VI M   	Abd: +BS, soft, nontender/nondistended, obese   	: No suprapubic tenderness  	UE: Warm, no cyanosis  no clubbing,  no edema  	LE: Warm, no cyanosis  no clubbing, 4+ edema, B/L LE raised red plaque   	Neuro: alert and oriented. speech coherent     LABS/STUDIES  --------------------------------------------------------------------------------              9.9    9.30  >-----------<  129      [04-18-24 @ 09:21]              33.1     128  |  89  |  88  ----------------------------<  228      [04-18-24 @ 09:24]  5.5   |  26  |  4.25        Ca     8.6     [04-18-24 @ 09:24]      Mg     2.2     [04-18-24 @ 09:24]      Phos  4.2     [04-18-24 @ 09:24]    TPro  6.0  /  Alb  2.8  /  TBili  1.1  /  DBili  x   /  AST  9   /  ALT  9   /  AlkPhos  50  [04-18-24 @ 09:24]          Creatinine Trend:  SCr 4.25 [04-18 @ 09:24]  SCr 3.62 [04-17 @ 11:00]  SCr 2.81 [04-16 @ 06:47]  SCr 2.63 [04-15 @ 23:15]  SCr 2.60 [04-15 @ 06:52]              Urine Creatinine 34      [04-12-24 @ 16:51]  Urine Protein 8      [04-12-24 @ 16:51]  Urine Sodium 27      [04-12-24 @ 16:51]  Urine Urea Nitrogen 243      [04-12-24 @ 18:07]  Urine Potassium 29      [04-12-24 @ 16:51]  Urine Osmolality 207      [04-12-24 @ 16:51]    TSH 5.06      [04-14-24 @ 07:18]  Lipid: chol 74, TG 70, HDL 33, LDL --      [04-13-24 @ 07:05]

## 2024-04-18 NOTE — PROGRESS NOTE ADULT - SUBJECTIVE AND OBJECTIVE BOX
***************************************************************  Vic Hayden, PGY2  Internal Medicine   Available on [employer] TEAMS  ***************************************************************    EVELYN LOPEZ  73y  MRN: [id number]    Patient is a 73y old  Female who presents with a chief complaint of Syncope (17 Apr 2024 23:54)      Interval/Overnight Events: no events ON.     Subjective: Pt seen and examined at bedside. Denies fever, CP, SOB, abn pain, N/V, dysuria. Tolerating diet.      MEDICATIONS  (STANDING):  ascorbic acid 500 milliGRAM(s) Oral daily  aspirin  chewable 81 milliGRAM(s) Oral daily  atorvastatin 80 milliGRAM(s) Oral at bedtime  bisacodyl Suppository 10 milliGRAM(s) Rectal once  buMETAnide Infusion 0.5 mG/Hr (2.5 mL/Hr) IV Continuous <Continuous>  calamine/zinc oxide Lotion 1 Application(s) Topical three times a day  cefepime   IVPB 1000 milliGRAM(s) IV Intermittent every 24 hours  dextrose 10% Bolus 125 milliLiter(s) IV Bolus once  dextrose 5%. 1000 milliLiter(s) (100 mL/Hr) IV Continuous <Continuous>  dextrose 5%. 1000 milliLiter(s) (50 mL/Hr) IV Continuous <Continuous>  dextrose 50% Injectable 12.5 Gram(s) IV Push once  dextrose 50% Injectable 25 Gram(s) IV Push once  ferrous    sulfate 325 milliGRAM(s) Oral two times a day  glucagon  Injectable 1 milliGRAM(s) IntraMuscular once  heparin   Injectable 5000 Unit(s) SubCutaneous every 8 hours  insulin glargine Injectable (LANTUS) 12 Unit(s) SubCutaneous at bedtime  insulin lispro (ADMELOG) corrective regimen sliding scale   SubCutaneous three times a day before meals  insulin lispro (ADMELOG) corrective regimen sliding scale   SubCutaneous at bedtime  lactated ringers. 500 milliLiter(s) (100 mL/Hr) IV Continuous <Continuous>  levothyroxine 75 MICROGram(s) Oral daily  loratadine Syrup 10 milliGRAM(s) Oral daily  multivitamin 1 Tablet(s) Oral daily  mupirocin 2% Nasal 1 Application(s) Both Nostrils two times a day  nystatin    Suspension 471642 Unit(s) Oral four times a day  nystatin/triamcinolone Cream 1 Application(s) Topical every 12 hours  pantoprazole    Tablet 40 milliGRAM(s) Oral before breakfast  polyethylene glycol 3350 17 Gram(s) Oral two times a day  senna 2 Tablet(s) Oral at bedtime  triamcinolone 0.1% Ointment 1 Application(s) Topical two times a day    MEDICATIONS  (PRN):  dextrose Oral Gel 15 Gram(s) Oral once PRN Blood Glucose LESS THAN 70 milliGRAM(s)/deciliter  diphenhydrAMINE Injectable 50 milliGRAM(s) IV Push every 4 hours PRN Rash and/or Itching  metoclopramide Injectable 10 milliGRAM(s) IV Push every 6 hours PRN nausea vomiting      Objective:    Vitals: Vital Signs Last 24 Hrs  T(C): 36.9 (04-18-24 @ 04:14), Max: 36.9 (04-17-24 @ 12:21)  T(F): 98.5 (04-18-24 @ 04:14), Max: 98.5 (04-18-24 @ 04:14)  HR: 82 (04-18-24 @ 04:14) (74 - 99)  BP: 106/56 (04-18-24 @ 04:14) (92/56 - 106/66)  BP(mean): 79 (04-17-24 @ 12:21) (79 - 79)  RR: 18 (04-18-24 @ 04:14) (17 - 18)  SpO2: 97% (04-18-24 @ 04:14) (93% - 100%)                I&O's Summary    17 Apr 2024 07:01  -  18 Apr 2024 07:00  --------------------------------------------------------  IN: 1032.5 mL / OUT: 350 mL / NET: 682.5 mL    PHYSICAL EXAM:  CONSTITUTIONAL: NAD, well-developed, looks uncomfortable   HEET: MMM, EOMI, PERRLA  NECK: supple  RESPIRATORY: Normal respiratory effort; bibasilar crackles   CARDIOVASCULAR: Regular rate and rhythm, normal S1 and S2, chronic lymphedema   ABDOMEN: Nontender to palpation, normoactive bowel sounds, no rebound/guarding;  MUSCULOSKELETAL: full ROM   NEURO: Moving all four extremities, sensation grossly intact  PSYCH: A+O to person, place, and time; affect appropriate  SKIN: psoriatic lesions on b/l UE and lymphedema rashes on LE. flushed skin    LABS:                        11.5   12.90 )-----------( 159      ( 17 Apr 2024 11:00 )             37.0                         12.6   14.94 )-----------( 220      ( 16 Apr 2024 09:50 )             42.6                         11.9   8.51  )-----------( 203      ( 16 Apr 2024 00:32 )             38.5     04-17    133<L>  |  91<L>  |  77<H>  ----------------------------<  121<H>  5.0   |  30  |  3.62<H>  04-16    135  |  94<L>  |  66<H>  ----------------------------<  54<LL>  5.1   |  26  |  2.81<H>  04-15    135  |  95<L>  |  65<H>  ----------------------------<  111<H>  4.3   |  20<L>  |  2.63<H>    Ca    8.6      17 Apr 2024 11:00  Ca    8.4      16 Apr 2024 06:47  Ca    8.9      15 Apr 2024 23:15  Phos  3.6     04-17  Mg     2.1     04-17    TPro  6.3  /  Alb  2.9<L>  /  TBili  1.1  /  DBili  x   /  AST  14  /  ALT  7<L>  /  AlkPhos  47  04-17  TPro  6.5  /  Alb  3.1<L>  /  TBili  1.0  /  DBili  x   /  AST  28  /  ALT  8<L>  /  AlkPhos  45  04-16  TPro  7.7  /  Alb  3.6  /  TBili  0.9  /  DBili  x   /  AST  23  /  ALT  9<L>  /  AlkPhos  71  04-15    CAPILLARY BLOOD GLUCOSE      POCT Blood Glucose.: 211 mg/dL (17 Apr 2024 21:21)  POCT Blood Glucose.: 152 mg/dL (17 Apr 2024 17:18)  POCT Blood Glucose.: 123 mg/dL (17 Apr 2024 13:06)  POCT Blood Glucose.: 136 mg/dL (17 Apr 2024 09:07)        Urinalysis Basic - ( 17 Apr 2024 11:00 )    Color: x / Appearance: x / SG: x / pH: x  Gluc: 121 mg/dL / Ketone: x  / Bili: x / Urobili: x   Blood: x / Protein: x / Nitrite: x   Leuk Esterase: x / RBC: x / WBC x   Sq Epi: x / Non Sq Epi: x / Bacteria: x          RADIOLOGY & ADDITIONAL TESTS:    Imaging Personally Reviewed:  [x ] YES  [ ] NO    Consultants involved in case:   Consultant(s) Notes Reviewed:  [ x] YES  [ ] NO:   Care Discussed with Consultants/Other Providers [x ] YES  [ ] NO         ***************************************************************  Vic Hayden, PGY2  Internal Medicine   Available on [employer] TEAMS  ***************************************************************    EVELYN LOPEZ  73y  MRN: [id number]    Patient is a 73y old  Female who presents with a chief complaint of Syncope (17 Apr 2024 23:54)    Interval/Overnight Events:   - found retaining o/n, hardy placed by urology due to difficulty  - worsening SCr, HD access obtained w IR. pending plans for HD  - K elevated, shifted, repeat BMP  - f/u allergy regarding steroids    Subjective: Pt seen and examined at bedside. Denies fever, CP, SOB, abn pain, N/V, dysuria. Tolerating diet.      MEDICATIONS  (STANDING):  ascorbic acid 500 milliGRAM(s) Oral daily  aspirin  chewable 81 milliGRAM(s) Oral daily  atorvastatin 80 milliGRAM(s) Oral at bedtime  bisacodyl Suppository 10 milliGRAM(s) Rectal once  buMETAnide Infusion 0.5 mG/Hr (2.5 mL/Hr) IV Continuous <Continuous>  calamine/zinc oxide Lotion 1 Application(s) Topical three times a day  cefepime   IVPB 1000 milliGRAM(s) IV Intermittent every 24 hours  dextrose 10% Bolus 125 milliLiter(s) IV Bolus once  dextrose 5%. 1000 milliLiter(s) (100 mL/Hr) IV Continuous <Continuous>  dextrose 5%. 1000 milliLiter(s) (50 mL/Hr) IV Continuous <Continuous>  dextrose 50% Injectable 12.5 Gram(s) IV Push once  dextrose 50% Injectable 25 Gram(s) IV Push once  ferrous    sulfate 325 milliGRAM(s) Oral two times a day  glucagon  Injectable 1 milliGRAM(s) IntraMuscular once  heparin   Injectable 5000 Unit(s) SubCutaneous every 8 hours  insulin glargine Injectable (LANTUS) 12 Unit(s) SubCutaneous at bedtime  insulin lispro (ADMELOG) corrective regimen sliding scale   SubCutaneous three times a day before meals  insulin lispro (ADMELOG) corrective regimen sliding scale   SubCutaneous at bedtime  lactated ringers. 500 milliLiter(s) (100 mL/Hr) IV Continuous <Continuous>  levothyroxine 75 MICROGram(s) Oral daily  loratadine Syrup 10 milliGRAM(s) Oral daily  multivitamin 1 Tablet(s) Oral daily  mupirocin 2% Nasal 1 Application(s) Both Nostrils two times a day  nystatin    Suspension 166196 Unit(s) Oral four times a day  nystatin/triamcinolone Cream 1 Application(s) Topical every 12 hours  pantoprazole    Tablet 40 milliGRAM(s) Oral before breakfast  polyethylene glycol 3350 17 Gram(s) Oral two times a day  senna 2 Tablet(s) Oral at bedtime  triamcinolone 0.1% Ointment 1 Application(s) Topical two times a day    MEDICATIONS  (PRN):  dextrose Oral Gel 15 Gram(s) Oral once PRN Blood Glucose LESS THAN 70 milliGRAM(s)/deciliter  diphenhydrAMINE Injectable 50 milliGRAM(s) IV Push every 4 hours PRN Rash and/or Itching  metoclopramide Injectable 10 milliGRAM(s) IV Push every 6 hours PRN nausea vomiting      Objective:    Vitals: Vital Signs Last 24 Hrs  T(C): 36.9 (04-18-24 @ 04:14), Max: 36.9 (04-17-24 @ 12:21)  T(F): 98.5 (04-18-24 @ 04:14), Max: 98.5 (04-18-24 @ 04:14)  HR: 82 (04-18-24 @ 04:14) (74 - 99)  BP: 106/56 (04-18-24 @ 04:14) (92/56 - 106/66)  BP(mean): 79 (04-17-24 @ 12:21) (79 - 79)  RR: 18 (04-18-24 @ 04:14) (17 - 18)  SpO2: 97% (04-18-24 @ 04:14) (93% - 100%)                I&O's Summary    17 Apr 2024 07:01  -  18 Apr 2024 07:00  --------------------------------------------------------  IN: 1032.5 mL / OUT: 350 mL / NET: 682.5 mL    PHYSICAL EXAM:  CONSTITUTIONAL: NAD, grossly erythematous  HEET: MMM, EOMI, PERRLA  NECK: supple  RESPIRATORY: Normal respiratory effort; bibasilar crackles   CARDIOVASCULAR: +punch biopsy on chest, no bleeding. Regular rate and rhythm, normal S1 and S2, chronic lymphedema   ABDOMEN: Nontender to palpation, normoactive bowel sounds, no rebound/guarding;  MUSCULOSKELETAL: full ROM   NEURO: Moving all four extremities, sensation grossly intact  PSYCH: A+O to person, place, and time; affect appropriate  SKIN: psoriatic lesions on b/l UE and lymphedema rashes on LE. flushed skin    LABS:                        11.5   12.90 )-----------( 159      ( 17 Apr 2024 11:00 )             37.0                         12.6   14.94 )-----------( 220      ( 16 Apr 2024 09:50 )             42.6                         11.9   8.51  )-----------( 203      ( 16 Apr 2024 00:32 )             38.5     04-17    133<L>  |  91<L>  |  77<H>  ----------------------------<  121<H>  5.0   |  30  |  3.62<H>  04-16    135  |  94<L>  |  66<H>  ----------------------------<  54<LL>  5.1   |  26  |  2.81<H>  04-15    135  |  95<L>  |  65<H>  ----------------------------<  111<H>  4.3   |  20<L>  |  2.63<H>    Ca    8.6      17 Apr 2024 11:00  Ca    8.4      16 Apr 2024 06:47  Ca    8.9      15 Apr 2024 23:15  Phos  3.6     04-17  Mg     2.1     04-17    TPro  6.3  /  Alb  2.9<L>  /  TBili  1.1  /  DBili  x   /  AST  14  /  ALT  7<L>  /  AlkPhos  47  04-17  TPro  6.5  /  Alb  3.1<L>  /  TBili  1.0  /  DBili  x   /  AST  28  /  ALT  8<L>  /  AlkPhos  45  04-16  TPro  7.7  /  Alb  3.6  /  TBili  0.9  /  DBili  x   /  AST  23  /  ALT  9<L>  /  AlkPhos  71  04-15    CAPILLARY BLOOD GLUCOSE      POCT Blood Glucose.: 211 mg/dL (17 Apr 2024 21:21)  POCT Blood Glucose.: 152 mg/dL (17 Apr 2024 17:18)  POCT Blood Glucose.: 123 mg/dL (17 Apr 2024 13:06)  POCT Blood Glucose.: 136 mg/dL (17 Apr 2024 09:07)        Urinalysis Basic - ( 17 Apr 2024 11:00 )    Color: x / Appearance: x / SG: x / pH: x  Gluc: 121 mg/dL / Ketone: x  / Bili: x / Urobili: x   Blood: x / Protein: x / Nitrite: x   Leuk Esterase: x / RBC: x / WBC x   Sq Epi: x / Non Sq Epi: x / Bacteria: x          RADIOLOGY & ADDITIONAL TESTS:    Imaging Personally Reviewed:  [x ] YES  [ ] NO    Consultants involved in case:   Consultant(s) Notes Reviewed:  [ x] YES  [ ] NO:   Care Discussed with Consultants/Other Providers [x ] YES  [ ] NO

## 2024-04-18 NOTE — PROGRESS NOTE ADULT - ATTENDING COMMENTS
73F PMH HFrEF (LVEF 30%), moderate AS, HTN, DM1.5, CKD3, hypothyroidism, chronic lymphadema, presents with episode of syncope. Notably, patient was recently discharged from [facility] about a week prior to admission. On day of admission, patient was with episode of shortness of breath, then sat down in car had had witnessed syncopal episode which lasted 10-15 seconds. No postictal state or incontinence.     #allergic reaction- pt with ongoing skin flushing and n/v, minimal relief with benadryl. Unclear if delayed contrast allergic reaction vs. infection vs. medication adverse effect. Pt s/p punch biopsy by derm 4/17  - blood cultures negative, urine pending  - abx stopped per ID, low suspicion infection  - allergy consulted- recommends holding off steroids for now, f/u biopsy results before administering any further contrast loads  - cont benadryl 50mg iv q6 hours prn, zyrtec, topical triamcinolone/calamine    #Syncope- concern for severe AS  - CT head noncon  - Carotid ultrasound   - Structural cardiology consult given results of most recent echo, cannot rule out AS  - Orthostatics when able  - Continue to monitor on tele, may warrant Zio patch as patient previously had halter, however no events recorded  - d/w structural, nephrology and patient- given reasonable risk to kidneys with contrast and possible TAVR; pt amenable to possibly needing HD  - will proceed with TAVR workup    #Acute CHF  - Strict IO, daily standing weights  - resume bumex with albumin per renal  - monitor Cr closely. check UA, bladder scan    #DUNCAN on CKD- worsening now, on bumex, hardy placed. Now planned for shiley placement at anticipate starting HD. Renal following    #Hyperglycemia, DMT2- pt now with episodes of hypoglycemia, likely 2/2 poor PO intake and ongoing insulin dosing, will reduce insulin and continue monitoring    #adrenal thickening- endocrine consulted, concerned for possible PCC vs. other adrenal issue- will need eventual CT iv contrast; holding off until derm biopsy back. Biochemical workup in the meantime.    plan d/w and agreed on by pt and partner at bedside    The necessity of the time spent during the encounter on this date of service was due to:   - Ordering, reviewing, and interpreting labs, testing, and imaging.  - Independently obtaining a review of systems and performing a physical exam  - Reviewing prior hospitalization and where necessary, outpatient records.  - Counselling and educating patient and family regarding interpretation of aforementioned items and plan of care.    Time-based billing (NON-critical care). Total minutes spent: 57

## 2024-04-18 NOTE — PROGRESS NOTE ADULT - ASSESSMENT
73F w HFrEF (EF 30%), mod AS, known LBBB, HTN, T1DM, CKD, hypothyroidism, chronic lymphedema, suspected OHS/LELIA on 3L NC home O2 p/w 1 episode of syncope with R arm jerking, likely 2/2 severe symptomatic AS. CTA imagings performed for TAVR eval, c/b DUNCAN on CKD. C/b febrile and hypotension, sepsis workup pending. CT c/a/p w/w/o IV contrast revealed incidental finding of L adrenal nodule.    #L adrenal nodule   Incidentally found on CT c/a/p w/wo IV contrast done for TAVR evaluation. The left adrenal gland is thickened and a 1.2 x 0.8 cm left adrenal nodule is seen. The right adrenal gland is normal.  Primary team discussed with radiology. HU of the adrenal nodule not able to be accurately determined due to motion artifact, also given imaging was taken at venous phase.     Reports no prior known hx of adrenal nodule.  Has HTN, normally well controlled on BP. Inpatient, she has been hypotensive. No episodic tachycardia, headaches, sweating. Denies weight changes prior to hospitalization. Denies easy bruising, bleeding normally although has had bruising with heparin shots inpatient.    Recommendations:  - Test for excess adrenal hormones:  = follow up plasma metanephrines  = follow up serum aldosterone and plasma renin activity   = follow up results of overnight dexamethasone suppression test (of note, patient received IV 1mg dexamethasone)  - Nonurgent CT non-contrast adrenal protocol to determine hounsfield units of adrenal nodule. (Of note, CT features suggesting malignancy include HU >10, mass size >4cm, and >50% contrast retention seen 10 minutes after contrast administration (contrast retention is of low specificity/sensitivity).)  - Will need follow up outpatient with Dr. Luis Dumont      #T1DM  Has hx of T1DM since age 26  Endocrinologist: Dr. Luis Dumont  Home regimen: Lantus 33 units qhs, Novolog based on sliding scale TIDAC normally 3-5 units  Has Dexcom G7  A1c is 7.4%  Uptrending BG to 300 today possibly due to starting CLD and likely also effect of dexamethasone  - Inpatient BG goal 100-180  - Lantus 12 units QHS  - Start premeal admelog 2 units TIDAC; HOLD if NPO or not eating  - Low dose admelog correction scale TIDAC  - Low dose admelog correction scale QHS  - Recommed CC CLD  - Of note, patient instructed on discharge from recent hospitalization at [facility] to start farxiga for CHF. Given risks of DKA of SGLT2i in T1DM, would not start until better data is available for its benefits.  - Discharge regimen: home basal/bolus insulin, dose TBD.  - Follow up with Dr. Luis Dumotn outpatient      #Hx of Hypothyroidism  TSH slightly elevated to 4.79-5.06 with normal FT4 1.3-1.5  - continue home LT4 75mcg daily  - repeat TFT's outpatient when clinically stabilized          Calvin Astudillo MD  Endocrine Fellow  Can be reached via teams. For follow up questions, discharge recommendations, or new consults, please call answering service at [phone] (weekdays); [phone] (nights/weekends). 73F w HFrEF (EF 30%), mod AS, known LBBB, HTN, T1DM, CKD, hypothyroidism, chronic lymphedema, suspected OHS/LELIA on 3L NC home O2 p/w 1 episode of syncope with R arm jerking, likely 2/2 severe symptomatic AS. CTA imagings performed for TAVR eval, c/b DUNCAN on CKD. C/b febrile and hypotension, sepsis workup pending. CT c/a/p w/w/o IV contrast revealed incidental finding of L adrenal nodule.    #L adrenal nodule   Incidentally found on CT c/a/p w/wo IV contrast done for TAVR evaluation. The left adrenal gland is thickened and a 1.2 x 0.8 cm left adrenal nodule is seen. The right adrenal gland is normal.  Primary team discussed with radiology. HU of the adrenal nodule not able to be accurately determined due to motion artifact, also given imaging was taken at venous phase.     Reports no prior known hx of adrenal nodule.  Has HTN, normally well controlled on BP. Inpatient, she has been hypotensive. No episodic tachycardia, headaches, sweating. Denies weight changes prior to hospitalization. Denies easy bruising, bleeding normally although has had bruising with heparin shots inpatient.    Recommendations:  - Test for excess adrenal hormones:  = follow up plasma metanephrines  = follow up serum aldosterone and plasma renin activity   = follow up results of overnight dexamethasone suppression test (of note, patient received IV 1mg dexamethasone)  - Also check DHEAS and androstenedione   - Nonurgent CT non-contrast adrenal protocol to determine hounsfield units of adrenal nodule. (Of note, CT features suggesting malignancy include HU >10, mass size >4cm, and >50% contrast retention seen 10 minutes after contrast administration (contrast retention is of low specificity/sensitivity).)  - Will need follow up outpatient with Dr. Luis Dumont      #T1DM  Has hx of T1DM since age 26  Endocrinologist: Dr. Luis Dumont  Home regimen: Lantus 33 units qhs, Novolog based on sliding scale TIDAC normally 3-5 units  Has Dexcom G7  A1c is 7.4%  Uptrending BG to 300 today possibly due to starting CLD and likely also effect of dexamethasone  - Inpatient BG goal 100-180  - Lantus 12 units QHS  - Start premeal admelog 2 units TIDAC; HOLD if NPO or not eating  - Low dose admelog correction scale TIDAC  - Low dose admelog correction scale QHS  - Recommed CC CLD  - Of note, patient instructed on discharge from recent hospitalization at [place] to start farxiga for CHF. Given risks of DKA of SGLT2i in T1DM, would not start until better data is available for its benefits.  - Discharge regimen: home basal/bolus insulin, dose TBD.  - Follow up with Dr. Luis Dumont outpatient      #Hx of Hypothyroidism  TSH slightly elevated to 4.79-5.06 with normal FT4 1.3-1.5  - continue home LT4 75mcg daily  - repeat TFT's outpatient when clinically stabilized          Calvin Astudillo MD  Endocrine Fellow  Can be reached via teams. For follow up questions, discharge recommendations, or new consults, please call answering service at [phone] (weekdays); [phone] (nights/weekends).

## 2024-04-18 NOTE — PROGRESS NOTE ADULT - PROBLEM SELECTOR PLAN 5
Unclear baseline SCr likely 2.1-2.2  Now with increasing Cr. and anuric    #AUR was found retaining w 400cc on bladder scan. Hardy placed.    Plan:  - trend BUN/SCr, avoid nephrotoxic, renally dose all meds  - strict I/Os  - Nephro following, consent for HD  - hardy placed for accurate I/O and AUR, maintain for now Unclear baseline SCr likely 2.1-2.2  Now with increasing Cr. and anuric    #AUR was found retaining w 400cc on bladder scan. Hardy placed.    Plan:  - trend BUN/SCr, avoid nephrotoxic, renally dose all meds  - strict I/Os  - Nephro following, consent for HD  - hardy placed for accurate I/O and AUR, maintain for now  - shiley access obtained 4/18 w IR. f/u plans for HD

## 2024-04-18 NOTE — CONSULT NOTE ADULT - SUBJECTIVE AND OBJECTIVE BOX
Interventional Radiology    Evaluate for Procedure: Non-tunneled HD catheter placement    HPI: 73F w HFrEF (EF 30%), mod AS, known LBBB, HTN, IDDM1, CKD, hypothyroidism, chronic lymphedema, suspected OHS/LELIA on 3L NC home O2 p/w 1 episode of syncope. Recent admission at [facility] (3/29-4/5) for ADHF and DUNCAN s/p diuresis, discharged with new home O2 3L NC suspecting OHS/LELIA pending outpatient w/u. Since discharge a week ago, she has been staying at home with   Pt had sob walking to car. Once in car, she was still breathing heavily. Partner noticed pt was not responding to verbal stimuli for 10-15sec and witnessed R arm jerking. Pt gained consciousness quickly without postictal symptoms. Pt went to Cardiologist Dr. Nathan who recommended pt to come to ED. No fever, cp, abd pain, n/v. Leg swelling is improved from prior. Pt on torsemide 40mg which she has been taking. Pt was discharged on 3LNC which she is currently on. Patient reports she did not take Farxiga that she was discharged on as she was concerned about side effects.     In ED, patient was found afebrile, hemodynamically stable, breathing well on 3L NC. Initial labs notable for mild hyponatremia, DUNCAN on CKD, elevated proBNP and elevated pCO2 both improved from prior.      IR consulted for non-tunneled HD catheter placement given rise in Cr and elevated K.    Allergies:   contrast media (iodine-based) (Fever; Vomiting; Flushing; Hypotension; Rash; Stomach Upset)  Ativan (Rash; Urticaria)  penicillins (Hives (Mod to Severe); Anaphylaxis (Mod to Severe); Short breath (Mod to Severe); Angioedema (Mod to Severe))    Medications (Abx/Cardiac/Anticoagulation/Blood Products)  aspirin  chewable: 81 milliGRAM(s) Oral (04-16 @ 14:06)  buMETAnide Infusion: 2.5 mL/Hr IV Continuous (04-17 @ 17:29)  buMETAnide Injectable: 2 milliGRAM(s) IV Push (04-17 @ 11:24)  cefepime   IVPB: 100 mL/Hr IV Intermittent (04-17 @ 00:03)  cefepime   IVPB: 100 mL/Hr IV Intermittent (04-18 @ 00:33)  heparin   Injectable: 5000 Unit(s) SubCutaneous (04-18 @ 05:09)  nystatin    Suspension: 642841 Unit(s) Oral (04-18 @ 06:43)    Data:    T(C): 36.9  HR: 82  BP: 106/56  RR: 18  SpO2: 97%    -WBC 9.30 / HgB 9.9 / Hct 33.1 / Plt 129  -Na 128 / Cl 89 / BUN 88 / Glucose 228  -K 5.5 / CO2 26 / Cr 4.25  -ALT 9 / Alk Phos 50 / T.Bili 1.1  -INR 1.13 / PTT 28.3    Assessment/Plan: 73y Female with HFrEF (EF 30%), mod AS, known LBBB, HTN, IDDM1, CKD, hypothyroidism, chronic lymphedema, suspected OHS/LELIA on 3L NC home O2 p/w 1 episode of syncope. Admission c/b DUNCAN (elevated Cr and K), IR consulted for non-tunneled HD catheter placement, case discussed with nephro.    - case reviewed and approved for 4/18/24  - please place IR procedure order under TABATHA Cunningham  - d/w primary team

## 2024-04-18 NOTE — CONSULT NOTE ADULT - ATTENDING COMMENTS
Psoriasis is longstanding- can treat with topcials. She also has a new red rash which could represent an early drug eruption (contrast vs abx).CTM
Briefly, this is a 73F woman with HFrEF (EF 30%), mod AS, known LBBB, HTN, IDDM1, CKD, hypothyroidism, chronic lymphedema, suspected OHS/LELIA on 3L NC home, psoriasis, hx of penicillin allergy who was admitted for work up of suspected cardiogenic syncope.  ~8 hours after administration of iodinated contrast, patient developed a diffuse, erythematous, pruritic rash with several pustules noted under her breast. Labs notable for eosinophilia, neutrophilia and lymphopenia (though pt has noted lymphopenia at baseline). DDX includes AGEP vs. DRESS vs. SJS, with AGEP seemingly the most likely on the ddx given the timeline, characteristics of the rash (pustules in intertriginous area), and laboratory abnormalities on CBC. Although abx are classically associated with drug rashes, pt noted the presence of the erythematous rash prior to antibiotic administration. SJS seems unlikely given the lack of mucosal involvement and lack of targetoid lesions but would d/c pantoprazole out of an overabundance of caution, if not deemed essential, as this drug can be associated with SJS. Await results of biopsy for definitive dx and agree with above approach for consideration of repeat contrast administration. Can f/u with A/I after discharge.
73-year-old woman with a history of HFrEF with an EF of 30%, type 1 diabetes, moderate aortic stenosis, CKD, hypothyroidism, chronic lymphedema, obstructive sleep apnea/OHS, presenting with syncope.  Endocrinology consulted for evaluation of adrenal nodule and type 1 diabetes management.  Regarding type 1 diabetes, with poor appetite, continue long-acting insulin Lantus 12 units and low-dose sliding scale every 6 hour as patient still with very poor appetite.  Regarding adrenal nodule, unable to obtain Hounsfield unit dose due to motion artifacts.  Consider obtaining CT noncontrast adrenal protocol for evaluation of adrenal nodule when possible.  We have pheochromocytoma by checking plasma metanephrines, check serum Matt and renin activity level to rule out Conn syndrome,  Cushing's syndrome primary performing dexamethasone suppression test as detailed in Dr. Astudillo's notes above.  Regarding hypothyroidism, continue with current regimen of levothyroxine 75 mcg once daily.  Repeat TFT outpatient or in 1 week when patient is more clinically stabilized.

## 2024-04-18 NOTE — PROGRESS NOTE ADULT - SUBJECTIVE AND OBJECTIVE BOX
Admitted for: Syncope and collapse        Following for: L adrenal nodule    Subjective:   Received dexamethasone 1mg IV yesterday evening  Started on clear liquid diet this morning, tolerating just broth      MEDICATIONS  (STANDING):  ascorbic acid 500 milliGRAM(s) Oral daily  aspirin  chewable 81 milliGRAM(s) Oral daily  atorvastatin 80 milliGRAM(s) Oral at bedtime  bisacodyl Suppository 10 milliGRAM(s) Rectal once  buMETAnide Infusion 0.5 mG/Hr (2.5 mL/Hr) IV Continuous <Continuous>  calamine/zinc oxide Lotion 1 Application(s) Topical three times a day  cetirizine 10 milliGRAM(s) Oral daily  chlorhexidine 4% Liquid 1 Application(s) Topical <User Schedule>  dextrose 10% Bolus 125 milliLiter(s) IV Bolus once  dextrose 5%. 1000 milliLiter(s) (100 mL/Hr) IV Continuous <Continuous>  dextrose 5%. 1000 milliLiter(s) (50 mL/Hr) IV Continuous <Continuous>  dextrose 50% Injectable 12.5 Gram(s) IV Push once  dextrose 50% Injectable 25 Gram(s) IV Push once  diphenhydrAMINE Injectable 50 milliGRAM(s) IntraMuscular every 6 hours  ferrous    sulfate 325 milliGRAM(s) Oral two times a day  glucagon  Injectable 1 milliGRAM(s) IntraMuscular once  heparin   Injectable 5000 Unit(s) SubCutaneous every 8 hours  insulin glargine Injectable (LANTUS) 12 Unit(s) SubCutaneous at bedtime  insulin lispro (ADMELOG) corrective regimen sliding scale   SubCutaneous three times a day before meals  insulin lispro (ADMELOG) corrective regimen sliding scale   SubCutaneous at bedtime  lactated ringers. 500 milliLiter(s) (100 mL/Hr) IV Continuous <Continuous>  levothyroxine 75 MICROGram(s) Oral daily  multivitamin 1 Tablet(s) Oral daily  mupirocin 2% Nasal 1 Application(s) Both Nostrils two times a day  nystatin    Suspension [id number] Unit(s) Oral four times a day  nystatin/triamcinolone Cream 1 Application(s) Topical every 12 hours  polyethylene glycol 3350 17 Gram(s) Oral two times a day  senna 2 Tablet(s) Oral at bedtime  triamcinolone 0.1% Ointment 1 Application(s) Topical two times a day    MEDICATIONS  (PRN):  dextrose Oral Gel 15 Gram(s) Oral once PRN Blood Glucose LESS THAN 70 milliGRAM(s)/deciliter  metoclopramide Injectable 10 milliGRAM(s) IV Push every 6 hours PRN nausea vomiting  sodium chloride 0.9% lock flush 10 milliLiter(s) IV Push every 1 hour PRN Pre/post blood products, medications, blood draw, and to maintain line patency      Allergies    contrast media (iodine-based) (Fever; Vomiting; Flushing; Hypotension; Rash; Stomach Upset)  Ativan (Rash; Urticaria)  penicillins (Hives (Mod to Severe); Anaphylaxis (Mod to Severe); Short breath (Mod to Severe); Angioedema (Mod to Severe))    Intolerances          PHYSICAL EXAM:  VITALS: T(C): 37.1 (04-18-24 @ 11:27)  T(F): 98.8 (04-18-24 @ 11:27), Max: 98.8 (04-18-24 @ 11:27)  HR: 98 (04-18-24 @ 11:27) (82 - 99)  BP: 125/69 (04-18-24 @ 11:27) (92/56 - 125/69)  RR:  (17 - 18)  SpO2:  (93% - 100%)  Wt(kg): --  GENERAL: NAD, obese  EYES: No proptosis, no lid lag, anicteric  RESPIRATORY: no respiratory distress  CARDIOVASCULAR: +peripheral edema  GI: obese abdomen  SKIN: erythema diffusely  EXT: PEREZ  PSYCH: Alert and oriented, reactive affect      A1C with Estimated Average Glucose Result: 7.4 % (03-30-24 @ 08:21)  A1C with Estimated Average Glucose Result: 6.8 % (01-10-24 @ 08:37)  A1C with Estimated Average Glucose Result: 7.3 % (08-12-23 @ 07:58)  A1C with Estimated Average Glucose Result: 8.2 % (06-05-23 @ 06:10)      POCT Blood Glucose.: 258 mg/dL (04-18-24 @ 13:24)  POCT Blood Glucose.: 300 mg/dL (04-18-24 @ 11:14)  POCT Blood Glucose.: 226 mg/dL (04-18-24 @ 10:51)  POCT Blood Glucose.: 236 mg/dL (04-18-24 @ 08:54)  POCT Blood Glucose.: 211 mg/dL (04-17-24 @ 21:21)  POCT Blood Glucose.: 152 mg/dL (04-17-24 @ 17:18)  POCT Blood Glucose.: 123 mg/dL (04-17-24 @ 13:06)  POCT Blood Glucose.: 136 mg/dL (04-17-24 @ 09:07)  POCT Blood Glucose.: 139 mg/dL (04-16-24 @ 22:29)  POCT Blood Glucose.: 126 mg/dL (04-16-24 @ 21:25)  POCT Blood Glucose.: 89 mg/dL (04-16-24 @ 19:18)  POCT Blood Glucose.: 91 mg/dL (04-16-24 @ 18:56)  POCT Blood Glucose.: 69 mg/dL (04-16-24 @ 18:19)  POCT Blood Glucose.: 64 mg/dL (04-16-24 @ 17:31)  POCT Blood Glucose.: 77 mg/dL (04-16-24 @ 12:49)  POCT Blood Glucose.: 103 mg/dL (04-16-24 @ 10:01)  POCT Blood Glucose.: 64 mg/dL (04-16-24 @ 09:06)  POCT Blood Glucose.: 72 mg/dL (04-16-24 @ 08:35)  POCT Blood Glucose.: 64 mg/dL (04-16-24 @ 08:11)  POCT Blood Glucose.: 124 mg/dL (04-16-24 @ 00:12)  POCT Blood Glucose.: 165 mg/dL (04-15-24 @ 21:39)  POCT Blood Glucose.: 173 mg/dL (04-15-24 @ 17:15)      04-18    128<L>  |  89<L>  |  88<H>  ----------------------------<  228<H>  5.5<H>   |  26  |  4.25<H>    eGFR: 11<L>    Ca    8.6      04-18  Mg     2.2     04-18  Phos  4.2     04-18    TPro  6.0  /  Alb  2.8<L>  /  TBili  1.1  /  DBili  x   /  AST  9<L>  /  ALT  9<L>  /  AlkPhos  50  04-18      Thyroid Function Tests:  04-14 @ 07:18 TSH 5.06 FreeT4 1.3 T3 -- Anti TPO -- Anti Thyroglobulin Ab -- TSI --  04-13 @ 07:05 TSH 4.79 FreeT4 1.5 T3 -- Anti TPO -- Anti Thyroglobulin Ab -- TSI --

## 2024-04-18 NOTE — PROVIDER CONTACT NOTE (OTHER) - ASSESSMENT
Patient's BP after multiple attempts is as above.Alert,oriented x4.Patient scheduled for Bumex IVP as well GTT.Vitals BP-92/56, heart rate -99/mt, c1btr-40% at room air

## 2024-04-18 NOTE — PROGRESS NOTE ADULT - ASSESSMENT
73F w HFrEF (EF 30%), mod AS, known LBBB, HTN, IDDM1, CKD, hypothyroidism, chronic lymphedema, suspected OHS/LELIA on 3L NC home O2 p/w 1 episode of syncope. Recent admission at [facility] (3/29-4/5) for ADHF and DUNCAN s/p diuresis, discharged with new home O2 3L NC suspecting OHS/LELIA pending outpatient w/u. Since discharge a week ago, she has been staying at home with   Pt had sob walking to car. Once in car, she was still breathing heavily. Partner noticed pt was not responding to verbal stimuli for 10-15sec and witnessed R arm jerking. Pt gained consciousness quickly without postictal symptoms. Pt went to Cardiologist Dr. Nathan who recommended pt to come to ED. No fever, cp, abd pain, n/v. Leg swelling is improved from prior. Pt on torsemide 40mg which she has been taking. Pt was discharged on 3LNC which she is currently on. Patient reports she did not take Farxiga that she was discharged on as she was concerned about side effects.     In ED, patient was found afebrile, hemodynamically stable, breathing well on 3L NC. Initial labs notable for mild hyponatremia, DUNCAN on CKD, elevated proBNP and elevated pCO2 both improved from prior.  pt also noticed with abn creatinine      1- CKD IV  with DUNCAN   2- CHF   3- lymphedema   4- severe AS   5- syncope       creatinine is worsening, now with hyperkalemia, oliguria  d/w patient and will proceed with renal replacement therapy today  hyperkalemia, to have lokelma 10G po x 1   IR consulted and approved, and non tunneled HD catheter placement today  will plan for hemodialysis today post catheter placement.   borderline intermittent hypotension also concerning  suspect ATN from hypotension along with contrast nephropathy   EP consulted for syncope, recommending device placement for bradycardia  now with fevers/hypotension, ID workup in progress  abx per ID, should be renally dosed for GFR 10 cc/min   ?contrast allergy, given new rash on face, arms, neck, and fever/hypotension and now +serum eos, after iv contrast given  derm consulted  structural heart team following, TAVR work up   cont O2 and tele monitor   trend creatinine and electrolytes daily

## 2024-04-18 NOTE — PROGRESS NOTE ADULT - ASSESSMENT
73F w HFrEF (EF 30%), mod AS, known LBBB, HTN, IDDM1, CKD, hypothyroidism, chronic lymphedema, p/w 1 episode of syncope with R arm jerking.     #Syncope-Aortic Stenosis  - Episode after exertion  - Known Aortic Stenosis likely Severe in setting of decreased LVEF  - Structural Heart evaluation on hold given the below issues    #Chronic Systolic HF (EF 30%), mod to severe AS, HTN, LBBB, Lymphedema   - Has known systolic HF and AS.    - Repeat TTE showed EF 30%, mild-mod LVH, mildly reduced RVF, mod-severe AS (.61 cmsq), mod pHTN  - Had cath 2022-Non obstructive CAD  - On home Torsemide 20 mg daily, Eplerenone 25 mg daily, and Toprol  mg daily  - Compliant with all meds including Torsemide, though, dose was reduced to Furosemide 40mg once daily.   - proBNP 51K from 80k.   - Consideration for TAVR  - Was on bumex gtt but then overnight 4/15 developed a fever to 102F and became hypotensive overnight. Bumex gtt stopped, given 250cc bolus. BPs remain low. UOP has dropped. I suspect she is intravascularly depleted.   - As per ID team, fever though to be 2/2 allergic rxn to contrast, rec to hold abx    # Acute kidney injury superimposed on CKD.   - Creatinine now worsening and now anuric  - De La Garza catheter placed  - Renal has consented for HD  - Plan for HD initiation in AM if does not improve  - On Bumex gtt now as per Renal recommendations  - Would consider RHC given worsening renal function, though already consented for HD at this time  - cont to monitor      #LBBB  Known LBBB  Noted intermittent Wenkebach on telemetry with bradycardia and 2:1 AV conduction  - EP has been consulted. For possible CRT-D vs AV micra as temporary measure.   - BB on hold    Will continue to follow closely.

## 2024-04-18 NOTE — PROGRESS NOTE ADULT - PROBLEM SELECTOR PLAN 1
RRT 4/16 for sepsis rectal temp 102, hypotension to systolic 70s, no leukocytosis, with new onset vomiting and rash  DDx inc: Delayed contrast reaction vs infection  -F/u MRSA swab+   -BCx 4/16 NGTD,     Plan:  - F/u Ucx, repeat CXR   - c/w Cefepime (PCN allergy) 2g q24h (renally dose)   - will hold off on Vanc for possible red man syndrome but unclear onset of rash timing   - F/u derm consult - likely drug related rash vs infectious -- punch biopsy taken 4/17  - ID consulted  - s/p 500 cc bolus, gave 50cc albumin, bumex IVP and started gtt

## 2024-04-18 NOTE — PROGRESS NOTE ADULT - PROBLEM SELECTOR PLAN 10
TSH mildly elevated 4.79, FT4 1.5    Plan:  - endocrine consulted; recs appreciated  - c/w home levothyroxine

## 2024-04-18 NOTE — CONSULT NOTE ADULT - SUBJECTIVE AND OBJECTIVE BOX
Patient is a 73y old  Female who presents with a chief complaint of Syncope (2024 10:12)    HPI:  73F w HFrEF (EF 30%), mod AS, known LBBB, HTN, IDDM1, CKD, hypothyroidism, chronic lymphedema, suspected OHS/LELIA on 3L NC home O2 p/w 1 episode of syncope. Recent admission at [facility] (3/29-) for ADHF and DUNCAN s/p diuresis, discharged with new home O2 3L NC suspecting OHS/LELIA pending outpatient w/u. Since discharge a week ago, she has been staying at home with   Pt had sob walking to car. Once in car, she was still breathing heavily. Partner noticed pt was not responding to verbal stimuli for 10-15sec and witnessed R arm jerking. Pt gained consciousness quickly without postictal symptoms. Pt went to Cardiologist Dr. Nathan who recommended pt to come to ED. No fever, cp, abd pain, n/v. Leg swelling is improved from prior. Pt on torsemide 40mg which she has been taking. Pt was discharged on 3LNC which she is currently on. Patient reports she did not take Farxiga that she was discharged on as she was concerned about side effects.     In ED, patient was found afebrile, hemodynamically stable, breathing well on 3L NC. Initial labs notable for mild hyponatremia, DUNCAN on CKD, elevated proBNP and elevated pCO2 both improved from prior. (2024 16:31)    Allergy/Immunology Consulted     4/15 ~3:30pm received 90 cc of nonionic intravenous contrast Omnipaque 350 (Iohexol).    4/15 ~ 10:45pm- BP 74/32, 102 F rectal temp , RN notes she appears flushed. Blood cx x2 drawn, RVP done, received Tylenol 1gm   12:23AM- Received vancomycin 1g, Cefepime 2g     No fevers since 4/15 10:45pm.     Home meds:   Novolog  Lantus  Asprin  Atorvastatin  Eplerenone  Iron  Levothyroxine  metoprolol   KCl  Torsemide  MVI  Vit D     All of these medications she has been on since at least 2024      New meds in the hospital:  Pantoprazole-  Bumetanide- started    Cefepime- 2 doses of cefepime total- last  midnight   Dexamethasone -  11pm  Benadryl   Nystatin oral suspension-   Vancomycin- Received only one dose of vancomycin  12:23 am  Zofran-  10:30a  albumin -  10:30am  Triamcinolone cream started  10:40pm      Has hx of penicillin allergy- reports >10 years ago had penicillin and after first dose developed throat closing and hives within 1 hour of first dose. Went to ER was given benadryl has avoided since  Ativan allergy- she is not sure why this is listed as an allergy but it was diagnosed at [facility] a few years ago and she has avoided it since. She is not sure what her reaction was    Reports hay fever, seasonal allergies worse in the fall.     No other hx of food or drug allergies.     Negative RVP, Bcx  NG    Urine Culture Results: 10,000 - 49,000 CFU/mL Escherichia coli (24 @ 14:57)     Eosinophils- eosinophilia  : 220  :560  :1050  : 940    Lymphocytes- lymphopenia  4/15: 630  : 80 x2  : 150  : 230    Neutrophils- neutrophilia  4/15: 3.35  : 7.99  : 13.64  : 11.06  : 7.75      PAST MEDICAL & SURGICAL HISTORY:  Hypertension      Diabetes      Lymphedema      H/O left bundle branch block      History of left bundle branch block (LBBB)      Systolic heart failure, chronic      Type 1 diabetes      H/O cataract        History of surgical removal of meniscus of knee  left in       Frozen shoulder        H/O Achilles tendon repair  lengthened bilaterally,       Fractured skull              Allergies    contrast media (iodine-based) (Fever; Vomiting; Flushing; Hypotension; Rash; Stomach Upset)  Ativan (Rash; Urticaria)  penicillins (Hives (Mod to Severe); Anaphylaxis (Mod to Severe); Short breath (Mod to Severe); Angioedema (Mod to Severe))    Intolerances      MEDICATIONS  (STANDING):  ascorbic acid 500 milliGRAM(s) Oral daily  aspirin  chewable 81 milliGRAM(s) Oral daily  atorvastatin 80 milliGRAM(s) Oral at bedtime  bisacodyl Suppository 10 milliGRAM(s) Rectal once  buMETAnide Infusion 0.5 mG/Hr (2.5 mL/Hr) IV Continuous <Continuous>  calamine/zinc oxide Lotion 1 Application(s) Topical three times a day  dextrose 10% Bolus 125 milliLiter(s) IV Bolus once  dextrose 5%. 1000 milliLiter(s) (100 mL/Hr) IV Continuous <Continuous>  dextrose 5%. 1000 milliLiter(s) (50 mL/Hr) IV Continuous <Continuous>  dextrose 50% Injectable 12.5 Gram(s) IV Push once  dextrose 50% Injectable 50 milliLiter(s) IV Push once  dextrose 50% Injectable 25 Gram(s) IV Push once  ferrous    sulfate 325 milliGRAM(s) Oral two times a day  glucagon  Injectable 1 milliGRAM(s) IntraMuscular once  heparin   Injectable 5000 Unit(s) SubCutaneous every 8 hours  insulin glargine Injectable (LANTUS) 12 Unit(s) SubCutaneous at bedtime  insulin lispro (ADMELOG) corrective regimen sliding scale   SubCutaneous three times a day before meals  insulin lispro (ADMELOG) corrective regimen sliding scale   SubCutaneous at bedtime  insulin regular  human recombinant 5 Unit(s) IV Push once  lactated ringers. 500 milliLiter(s) (100 mL/Hr) IV Continuous <Continuous>  levothyroxine 75 MICROGram(s) Oral daily  multivitamin 1 Tablet(s) Oral daily  mupirocin 2% Nasal 1 Application(s) Both Nostrils two times a day  nystatin    Suspension 385640 Unit(s) Oral four times a day  nystatin/triamcinolone Cream 1 Application(s) Topical every 12 hours  pantoprazole    Tablet 40 milliGRAM(s) Oral before breakfast  polyethylene glycol 3350 17 Gram(s) Oral two times a day  senna 2 Tablet(s) Oral at bedtime  sodium zirconium cyclosilicate 10 Gram(s) Oral once  triamcinolone 0.1% Ointment 1 Application(s) Topical two times a day    MEDICATIONS  (PRN):  dextrose Oral Gel 15 Gram(s) Oral once PRN Blood Glucose LESS THAN 70 milliGRAM(s)/deciliter  diphenhydrAMINE Injectable 50 milliGRAM(s) IV Push every 4 hours PRN Rash and/or Itching  metoclopramide Injectable 10 milliGRAM(s) IV Push every 6 hours PRN nausea vomiting      FAMILY HISTORY:  Family history of CVA  mom, age 57        Daily     Daily Weight in k.9 (2024 04:14)  Vital Signs Last 24 Hrs  T(C): 36.9 (2024 04:14), Max: 36.9 (2024 12:21)  T(F): 98.5 (2024 04:14), Max: 98.5 (2024 04:14)  HR: 82 (2024 04:14) (74 - 99)  BP: 106/56 (2024 04:14) (92/56 - 106/66)  BP(mean): 79 (2024 12:21) (79 - 79)  RR: 18 (2024 04:14) (17 - 18)  SpO2: 97% (2024 04:14) (93% - 100%)    Parameters below as of 2024 04:14  Patient On (Oxygen Delivery Method): nasal cannula  O2 Flow (L/min): 3      Physical Exam:  General:	                    No apparent distress  HEENT:	                    Normocephalic atraumatic  Cardiovascular	Regular rate, normal S1, S2, no murmurs  Respiratory	CTAB, no retracractions  Abdominal	Normal:      Soft, ND, NT, bowel sounds present, no masses, no organomegaly  Extremities	No cyanosis or edema, 2+ pulses  Skin		Intact and not indurated, no rash, no desquamation  Neurologic	Normal:      Grossly Intact    Lab Results:                        9.9    9.30  )-----------( 129      ( 2024 09:21 )             33.1                         11.5   12.90 )-----------( 159      ( 2024 11:00 )             37.0     2024 09:24    128    |  89     |  88     ----------------------------<  228    5.5     |  26     |  4.25   2024 11:00    133    |  91     |  77     ----------------------------<  121    5.0     |  30     |  3.62     Ca    8.6        2024 09:24  Ca    8.6        2024 11:00  Phos  4.2       2024 09:24  Phos  3.6       2024 11:00  Mg     2.2       2024 09:24  Mg     2.1       2024 11:00    TPro  6.0    /  Alb  2.8    /  TBili  1.1    /  DBili  x      /  AST  9      /  ALT  9      /  AlkPhos  50     2024 09:24  TPro  6.3    /  Alb  2.9    /  TBili  1.1    /  DBili  x      /  AST  14     /  ALT  7      /  AlkPhos  47     2024 11:00    LIVER FUNCTIONS - ( 2024 09:24 )  Alb: 2.8 g/dL / Pro: 6.0 g/dL / ALK PHOS: 50 U/L / ALT: 9 U/L / AST: 9 U/L / GGT: x         LIVER FUNCTIONS - ( 2024 11:00 )  Alb: 2.9 g/dL / Pro: 6.3 g/dL / ALK PHOS: 47 U/L / ALT: 7 U/L / AST: 14 U/L / GGT: x           Urinalysis Basic - ( 2024 09:24 )    Color: x / Appearance: x / SG: x / pH: x  Gluc: 228 mg/dL / Ketone: x  / Bili: x / Urobili: x   Blood: x / Protein: x / Nitrite: x   Leuk Esterase: x / RBC: x / WBC x   Sq Epi: x / Non Sq Epi: x / Bacteria: x       Patient is a 73y old  Female who presents with a chief complaint of Syncope (2024 10:12)    HPI:  73F w HFrEF (EF 30%), mod AS, known LBBB, HTN, IDDM1, CKD, hypothyroidism, chronic lymphedema, suspected OHS/LELIA on 3L NC home O2 p/w 1 episode of syncope. Recent admission at [facility] (3/29-) for ADHF and DUNCAN s/p diuresis, discharged with new home O2 3L NC suspecting OHS/LELIA pending outpatient w/u. Since discharge a week ago, she has been staying at home with   Pt had sob walking to car. Once in car, she was still breathing heavily. Partner noticed pt was not responding to verbal stimuli for 10-15sec and witnessed R arm jerking. Pt gained consciousness quickly without postictal symptoms. Pt went to Cardiologist Dr. Nathan who recommended pt to come to ED. No fever, cp, abd pain, n/v. Leg swelling is improved from prior. Pt on torsemide 40mg which she has been taking. Pt was discharged on 3LNC which she is currently on. Patient reports she did not take Farxiga that she was discharged on as she was concerned about side effects.     In ED, patient was found afebrile, hemodynamically stable, breathing well on 3L NC. Initial labs notable for mild hyponatremia, DUNCAN on CKD, elevated proBNP and elevated pCO2 both improved from prior. (2024 16:31)    Allergy/Immunology Consulted     4/15 ~3:30pm received 90 cc of nonionic intravenous contrast Omnipaque 350 (Iohexol).    4/15 ~ 10:45pm- BP 74/32, 102 F rectal temp , RN notes she appears flushed. Blood cx x2 drawn, RVP done, received Tylenol 1gm   12:23AM- Received vancomycin 1g, Cefepime 2g     No fevers since 4/15 10:45pm.     Nurse reports  had skin sloughing. No reported blisters or pustules     Home meds:   Novolog  Lantus  Asprin  Atorvastatin  Eplerenone  Iron  Levothyroxine  metoprolol   KCl  Torsemide  MVI  Vit D     All of these medications she has been on since at least 2024      New meds in the hospital:  Pantoprazole-  Bumetanide- started    Cefepime- 2 doses of cefepime total- last  midnight   Dexamethasone -  11pm  Benadryl   Nystatin oral suspension-   Vancomycin- Received only one dose of vancomycin  12:23 am  Zofran-  10:30a  albumin -  10:30am  Triamcinolone cream started  10:40pm      Has hx of penicillin allergy- reports >10 years ago had penicillin and after first dose developed throat closing and hives within 1 hour of first dose. Went to ER was given benadryl has avoided since  Ativan allergy- she is not sure why this is listed as an allergy but it was diagnosed at [facility] a few years ago and she has avoided it since. She is not sure what her reaction was    Reports hay fever, seasonal allergies worse in the fall.     No other hx of food or drug allergies.     Negative RVP, Bcx  NG    Urine Culture Results: 10,000 - 49,000 CFU/mL Escherichia coli (24 @ 14:57)     Eosinophils- eosinophilia  : 220  :560  :1050  : 940    Lymphocytes- lymphopenia  4/15: 630  : 80 x2  : 150  : 230    Neutrophils- neutrophilia  4/15: 3.35  : 7.99  : 13.64  : 11.06  : 7.75      PAST MEDICAL & SURGICAL HISTORY:  Hypertension      Diabetes      Lymphedema      H/O left bundle branch block      History of left bundle branch block (LBBB)      Systolic heart failure, chronic      Type 1 diabetes      H/O cataract        History of surgical removal of meniscus of knee  left in       Frozen shoulder        H/O Achilles tendon repair  lengthened bilaterally,       Fractured skull              Allergies    contrast media (iodine-based) (Fever; Vomiting; Flushing; Hypotension; Rash; Stomach Upset)  Ativan (Rash; Urticaria)  penicillins (Hives (Mod to Severe); Anaphylaxis (Mod to Severe); Short breath (Mod to Severe); Angioedema (Mod to Severe))    Intolerances      MEDICATIONS  (STANDING):  ascorbic acid 500 milliGRAM(s) Oral daily  aspirin  chewable 81 milliGRAM(s) Oral daily  atorvastatin 80 milliGRAM(s) Oral at bedtime  bisacodyl Suppository 10 milliGRAM(s) Rectal once  buMETAnide Infusion 0.5 mG/Hr (2.5 mL/Hr) IV Continuous <Continuous>  calamine/zinc oxide Lotion 1 Application(s) Topical three times a day  dextrose 10% Bolus 125 milliLiter(s) IV Bolus once  dextrose 5%. 1000 milliLiter(s) (100 mL/Hr) IV Continuous <Continuous>  dextrose 5%. 1000 milliLiter(s) (50 mL/Hr) IV Continuous <Continuous>  dextrose 50% Injectable 12.5 Gram(s) IV Push once  dextrose 50% Injectable 50 milliLiter(s) IV Push once  dextrose 50% Injectable 25 Gram(s) IV Push once  ferrous    sulfate 325 milliGRAM(s) Oral two times a day  glucagon  Injectable 1 milliGRAM(s) IntraMuscular once  heparin   Injectable 5000 Unit(s) SubCutaneous every 8 hours  insulin glargine Injectable (LANTUS) 12 Unit(s) SubCutaneous at bedtime  insulin lispro (ADMELOG) corrective regimen sliding scale   SubCutaneous three times a day before meals  insulin lispro (ADMELOG) corrective regimen sliding scale   SubCutaneous at bedtime  insulin regular  human recombinant 5 Unit(s) IV Push once  lactated ringers. 500 milliLiter(s) (100 mL/Hr) IV Continuous <Continuous>  levothyroxine 75 MICROGram(s) Oral daily  multivitamin 1 Tablet(s) Oral daily  mupirocin 2% Nasal 1 Application(s) Both Nostrils two times a day  nystatin    Suspension 837731 Unit(s) Oral four times a day  nystatin/triamcinolone Cream 1 Application(s) Topical every 12 hours  pantoprazole    Tablet 40 milliGRAM(s) Oral before breakfast  polyethylene glycol 3350 17 Gram(s) Oral two times a day  senna 2 Tablet(s) Oral at bedtime  sodium zirconium cyclosilicate 10 Gram(s) Oral once  triamcinolone 0.1% Ointment 1 Application(s) Topical two times a day    MEDICATIONS  (PRN):  dextrose Oral Gel 15 Gram(s) Oral once PRN Blood Glucose LESS THAN 70 milliGRAM(s)/deciliter  diphenhydrAMINE Injectable 50 milliGRAM(s) IV Push every 4 hours PRN Rash and/or Itching  metoclopramide Injectable 10 milliGRAM(s) IV Push every 6 hours PRN nausea vomiting      FAMILY HISTORY:  Family history of CVA  mom, age 57        Daily     Daily Weight in k.9 (2024 04:14)  Vital Signs Last 24 Hrs  T(C): 36.9 (2024 04:14), Max: 36.9 (2024 12:21)  T(F): 98.5 (2024 04:14), Max: 98.5 (2024 04:14)  HR: 82 (2024 04:14) (74 - 99)  BP: 106/56 (2024 04:14) (92/56 - 106/66)  BP(mean): 79 (2024 12:21) (79 - 79)  RR: 18 (2024 04:14) (17 - 18)  SpO2: 97% (2024 04:14) (93% - 100%)    Parameters below as of 2024 04:14  Patient On (Oxygen Delivery Method): nasal cannula  O2 Flow (L/min): 3      Physical Exam:  General:	                    No apparent distress  HEENT:	                    Normocephalic atraumatic  Cardiovascular	Regular rate, normal S1, S2, no murmurs  Respiratory	CTAB, no retracractions  Abdominal	Normal:      Soft, ND, NT, bowel sounds present, no masses, no organomegaly  Extremities	No cyanosis or edema, 2+ pulses  Skin		Intact and not indurated, no rash, no desquamation  Neurologic	Normal:      Grossly Intact    Lab Results:                        9.9    9.30  )-----------( 129      ( 2024 09:21 )             33.1                         11.5   12.90 )-----------( 159      ( 2024 11:00 )             37.0     2024 09:24    128    |  89     |  88     ----------------------------<  228    5.5     |  26     |  4.25   2024 11:00    133    |  91     |  77     ----------------------------<  121    5.0     |  30     |  3.62     Ca    8.6        2024 09:24  Ca    8.6        2024 11:00  Phos  4.2       2024 09:24  Phos  3.6       2024 11:00  Mg     2.2       2024 09:24  Mg     2.1       2024 11:00    TPro  6.0    /  Alb  2.8    /  TBili  1.1    /  DBili  x      /  AST  9      /  ALT  9      /  AlkPhos  50     2024 09:24  TPro  6.3    /  Alb  2.9    /  TBili  1.1    /  DBili  x      /  AST  14     /  ALT  7      /  AlkPhos  47     2024 11:00    LIVER FUNCTIONS - ( 2024 09:24 )  Alb: 2.8 g/dL / Pro: 6.0 g/dL / ALK PHOS: 50 U/L / ALT: 9 U/L / AST: 9 U/L / GGT: x         LIVER FUNCTIONS - ( 2024 11:00 )  Alb: 2.9 g/dL / Pro: 6.3 g/dL / ALK PHOS: 47 U/L / ALT: 7 U/L / AST: 14 U/L / GGT: x           Urinalysis Basic - ( 2024 09:24 )    Color: x / Appearance: x / SG: x / pH: x  Gluc: 228 mg/dL / Ketone: x  / Bili: x / Urobili: x   Blood: x / Protein: x / Nitrite: x   Leuk Esterase: x / RBC: x / WBC x   Sq Epi: x / Non Sq Epi: x / Bacteria: x       Patient is a 73y old  Female who presents with a chief complaint of Syncope (2024 10:12)    HPI:  73F w HFrEF (EF 30%), mod AS, known LBBB, HTN, IDDM1, CKD, hypothyroidism, chronic lymphedema, suspected OHS/LELIA on 3L NC home O2 p/w 1 episode of syncope. Recent admission at [facility] (3/29-) for ADHF and DUNCAN s/p diuresis, discharged with new home O2 3L NC suspecting OHS/LELIA pending outpatient w/u. Since discharge a week ago, she has been staying at home with   Pt had sob walking to car. Once in car, she was still breathing heavily. Partner noticed pt was not responding to verbal stimuli for 10-15sec and witnessed R arm jerking. Pt gained consciousness quickly without postictal symptoms. Pt went to Cardiologist Dr. Nathan who recommended pt to come to ED. No fever, cp, abd pain, n/v. Leg swelling is improved from prior. Pt on torsemide 40mg which she has been taking. Pt was discharged on 3LNC which she is currently on. Patient reports she did not take Farxiga that she was discharged on as she was concerned about side effects.     In ED, patient was found afebrile, hemodynamically stable, breathing well on 3L NC. Initial labs notable for mild hyponatremia, DUNCAN on CKD, elevated proBNP and elevated pCO2 both improved from prior. (2024 16:31)    Allergy/Immunology Consulted     4/15 ~3:30pm received 90 cc of nonionic intravenous contrast Omnipaque 350 (Iohexol).    4/15 ~ 10:45pm- BP 74/32, 102 F rectal temp , RN notes she appears flushed. Blood cx x2 drawn, RVP done, received Tylenol 1gm   12:23AM- Received vancomycin 1g, Cefepime 2g     No fevers since 4/15 10:45pm.     Nurse reports  had skin sloughing. No reported blisters or pustules. No pain or blisters in mouth or  area. Rash is itchy not painful. Does not think she has been given IV contrast before.    Was having a lot of nausea and vomiting, no diarrhea. Did have hardy placed overnight  for urinary retention.    Home meds:   Novolog  Lantus  Asprin  Atorvastatin  Eplerenone  Iron  Levothyroxine  metoprolol   KCl  Torsemide  MVI  Vit D     All of these medications she has been on since at least 2024      New meds in the hospital:  Pantoprazole-  Bumetanide- started    Cefepime- 2 doses of cefepime total- last  midnight   Dexamethasone -  11pm  Benadryl   Nystatin oral suspension-   Vancomycin- Received only one dose of vancomycin  12:23 am  Zofran-  10:30a  albumin -  10:30am  Triamcinolone cream started  10:40pm  Nystatin oral- started , Nystatin powder 4/15      Has hx of penicillin allergy- reports >10 years ago had penicillin and after first dose developed throat closing and hives within 1 hour of first dose. Went to ER was given benadryl has avoided since  Ativan allergy- she is not sure why this is listed as an allergy but it was diagnosed at [facility] a few years ago and she has avoided it since. She is not sure what her reaction was    Reports hay fever, seasonal allergies worse in the fall.     No other hx of food or drug allergies.     Negative RVP, Bcx  NG    Urine Culture Results: 10,000 - 49,000 CFU/mL Escherichia coli (24 @ 14:57)     Eosinophils- eosinophilia  : 220  :560  :1050  : 940    Lymphocytes- lymphopenia  4/15: 630  : 80 x2  : 150  : 230    Neutrophils- neutrophilia  4/15: 3.35  : 7.99  : 13.64  : 11.06  : 7.75      PAST MEDICAL & SURGICAL HISTORY:  Hypertension      Diabetes      Lymphedema      H/O left bundle branch block      History of left bundle branch block (LBBB)      Systolic heart failure, chronic      Type 1 diabetes      H/O cataract        History of surgical removal of meniscus of knee  left in       Frozen shoulder        H/O Achilles tendon repair  lengthened bilaterally,       Fractured skull              Allergies    contrast media (iodine-based) (Fever; Vomiting; Flushing; Hypotension; Rash; Stomach Upset)  Ativan (Rash; Urticaria)  penicillins (Hives (Mod to Severe); Anaphylaxis (Mod to Severe); Short breath (Mod to Severe); Angioedema (Mod to Severe))    Intolerances      MEDICATIONS  (STANDING):  ascorbic acid 500 milliGRAM(s) Oral daily  aspirin  chewable 81 milliGRAM(s) Oral daily  atorvastatin 80 milliGRAM(s) Oral at bedtime  bisacodyl Suppository 10 milliGRAM(s) Rectal once  buMETAnide Infusion 0.5 mG/Hr (2.5 mL/Hr) IV Continuous <Continuous>  calamine/zinc oxide Lotion 1 Application(s) Topical three times a day  dextrose 10% Bolus 125 milliLiter(s) IV Bolus once  dextrose 5%. 1000 milliLiter(s) (100 mL/Hr) IV Continuous <Continuous>  dextrose 5%. 1000 milliLiter(s) (50 mL/Hr) IV Continuous <Continuous>  dextrose 50% Injectable 12.5 Gram(s) IV Push once  dextrose 50% Injectable 50 milliLiter(s) IV Push once  dextrose 50% Injectable 25 Gram(s) IV Push once  ferrous    sulfate 325 milliGRAM(s) Oral two times a day  glucagon  Injectable 1 milliGRAM(s) IntraMuscular once  heparin   Injectable 5000 Unit(s) SubCutaneous every 8 hours  insulin glargine Injectable (LANTUS) 12 Unit(s) SubCutaneous at bedtime  insulin lispro (ADMELOG) corrective regimen sliding scale   SubCutaneous three times a day before meals  insulin lispro (ADMELOG) corrective regimen sliding scale   SubCutaneous at bedtime  insulin regular  human recombinant 5 Unit(s) IV Push once  lactated ringers. 500 milliLiter(s) (100 mL/Hr) IV Continuous <Continuous>  levothyroxine 75 MICROGram(s) Oral daily  multivitamin 1 Tablet(s) Oral daily  mupirocin 2% Nasal 1 Application(s) Both Nostrils two times a day  nystatin    Suspension 300175 Unit(s) Oral four times a day  nystatin/triamcinolone Cream 1 Application(s) Topical every 12 hours  pantoprazole    Tablet 40 milliGRAM(s) Oral before breakfast  polyethylene glycol 3350 17 Gram(s) Oral two times a day  senna 2 Tablet(s) Oral at bedtime  sodium zirconium cyclosilicate 10 Gram(s) Oral once  triamcinolone 0.1% Ointment 1 Application(s) Topical two times a day    MEDICATIONS  (PRN):  dextrose Oral Gel 15 Gram(s) Oral once PRN Blood Glucose LESS THAN 70 milliGRAM(s)/deciliter  diphenhydrAMINE Injectable 50 milliGRAM(s) IV Push every 4 hours PRN Rash and/or Itching  metoclopramide Injectable 10 milliGRAM(s) IV Push every 6 hours PRN nausea vomiting      FAMILY HISTORY:  Family history of CVA  mom, age 57        Daily     Daily Weight in k.9 (2024 04:14)  Vital Signs Last 24 Hrs  T(C): 36.9 (2024 04:14), Max: 36.9 (2024 12:21)  T(F): 98.5 (2024 04:14), Max: 98.5 (2024 04:14)  HR: 82 (2024 04:14) (74 - 99)  BP: 106/56 (2024 04:14) (92/56 - 106/66)  BP(mean): 79 (2024 12:21) (79 - 79)  RR: 18 (2024 04:14) (17 - 18)  SpO2: 97% (2024 04:14) (93% - 100%)    Parameters below as of 2024 04:14  Patient On (Oxygen Delivery Method): nasal cannula  O2 Flow (L/min): 3      Physical Exam:  General:	                   laying in bed  HEENT:	                    Normocephalic atraumatic, Nasal canula in place  Respiratory	breathing comfortably  Extremities	+edema on b/l legs, feet  Skin		Diffuse erythema (torso, extremities). No pustules notes. +psoriasis plaques. Denuded skin on upper thighs   Neurologic	Normal:      Grossly Intact    Lab Results:                        9.9    9.30  )-----------( 129      ( 2024 09:21 )             33.1                         11.5   12.90 )-----------( 159      ( 2024 11:00 )             37.0     2024 09:24    128    |  89     |  88     ----------------------------<  228    5.5     |  26     |  4.25   2024 11:00    133    |  91     |  77     ----------------------------<  121    5.0     |  30     |  3.62     Ca    8.6        2024 09:24  Ca    8.6        2024 11:00  Phos  4.2       2024 09:24  Phos  3.6       2024 11:00  Mg     2.2       2024 09:24  Mg     2.1       2024 11:00    TPro  6.0    /  Alb  2.8    /  TBili  1.1    /  DBili  x      /  AST  9      /  ALT  9      /  AlkPhos  50     2024 09:24  TPro  6.3    /  Alb  2.9    /  TBili  1.1    /  DBili  x      /  AST  14     /  ALT  7      /  AlkPhos  47     2024 11:00    LIVER FUNCTIONS - ( 2024 09:24 )  Alb: 2.8 g/dL / Pro: 6.0 g/dL / ALK PHOS: 50 U/L / ALT: 9 U/L / AST: 9 U/L / GGT: x         LIVER FUNCTIONS - ( 2024 11:00 )  Alb: 2.9 g/dL / Pro: 6.3 g/dL / ALK PHOS: 47 U/L / ALT: 7 U/L / AST: 14 U/L / GGT: x           Urinalysis Basic - ( 2024 09:24 )    Color: x / Appearance: x / SG: x / pH: x  Gluc: 228 mg/dL / Ketone: x  / Bili: x / Urobili: x   Blood: x / Protein: x / Nitrite: x   Leuk Esterase: x / RBC: x / WBC x   Sq Epi: x / Non Sq Epi: x / Bacteria: x       Patient is a 73y old  Female who presents with a chief complaint of Syncope (2024 10:12)    HPI:  73F w HFrEF (EF 30%), mod AS, known LBBB, HTN, IDDM1, CKD, hypothyroidism, chronic lymphedema, suspected OHS/LELIA on 3L NC home O2 p/w 1 episode of syncope. Recent admission at [facility] (3/29-) for ADHF and DUNCAN s/p diuresis, discharged with new home O2 3L NC suspecting OHS/LELIA pending outpatient w/u. Since discharge a week ago, she has been staying at home with   Pt had sob walking to car. Once in car, she was still breathing heavily. Partner noticed pt was not responding to verbal stimuli for 10-15sec and witnessed R arm jerking. Pt gained consciousness quickly without postictal symptoms. Pt went to Cardiologist Dr. Nathan who recommended pt to come to ED. No fever, cp, abd pain, n/v. Leg swelling is improved from prior. Pt on torsemide 40mg which she has been taking. Pt was discharged on 3LNC which she is currently on. Patient reports she did not take Farxiga that she was discharged on as she was concerned about side effects.     In ED, patient was found afebrile, hemodynamically stable, breathing well on 3L NC. Initial labs notable for mild hyponatremia, DUNCAN on CKD, elevated proBNP and elevated pCO2 both improved from prior. (2024 16:31)    Allergy/Immunology Consulted     4/15 ~3:30pm received 90 cc of nonionic intravenous contrast Omnipaque 350 (Iohexol).    4/15 ~ 10:45pm- BP 74/32, 102 F rectal temp , RN notes she appears flushed. Blood cx x2 drawn, RVP done, received Tylenol 1gm   12:23AM- Received vancomycin 1g, Cefepime 2g     No fevers since 4/15 10:45pm.     Nurse reports  had skin sloughing. No reported blisters or pustules. No pain or blisters in mouth or  area. Rash is itchy but not painful. Does not think she has been given IV contrast before.    Was having a lot of nausea and vomiting, no diarrhea. Did have hardy placed overnight  for urinary retention.    Home meds:   Novolog  Lantus  Aspirin  Atorvastatin  Eplerenone  Iron  Levothyroxine  metoprolol   KCl  Torsemide  MVI  Vit D     All of these medications she has been on since at least 2024      New meds in the hospital:  Pantoprazole-  Bumetanide- started    Cefepime- 2 doses of cefepime total- last  midnight   Dexamethasone -  11pm  Benadryl   Nystatin oral suspension-   Vancomycin- Received only one dose of vancomycin  12:23 am  Zofran-  10:30a  albumin -  10:30am  Triamcinolone cream started  10:40pm  Nystatin oral- started , Nystatin powder 4/15      Has hx of penicillin allergy- reports >10 years ago had penicillin and after first dose developed throat closing and hives within 1 hour of first dose. Went to ER was given benadryl has avoided since  Ativan allergy- she is not sure why this is listed as an allergy but it was diagnosed at [facility] a few years ago and she has avoided it since. She is not sure what her reaction was    Reports hay fever, seasonal allergies worse in the fall.     No other hx of food or drug allergies.     Negative RVP, Bcx  NG    Urine Culture Results: 10,000 - 49,000 CFU/mL Escherichia coli (24 @ 14:57)     Eosinophils- eosinophilia  : 220  :560  :1050  : 940    Lymphocytes- lymphopenia  4/15: 630  : 80 x2  : 150  : 230    Neutrophils- neutrophilia  4/15: 3.35  : 7.99  : 13.64  : 11.06  : 7.75      PAST MEDICAL & SURGICAL HISTORY:  Hypertension      Diabetes      Lymphedema      H/O left bundle branch block      History of left bundle branch block (LBBB)      Systolic heart failure, chronic      Type 1 diabetes      H/O cataract        History of surgical removal of meniscus of knee  left in       Frozen shoulder        H/O Achilles tendon repair  lengthened bilaterally,       Fractured skull              Allergies    contrast media (iodine-based) (Fever; Vomiting; Flushing; Hypotension; Rash; Stomach Upset)  Ativan (Rash; Urticaria)  penicillins (Hives (Mod to Severe); Anaphylaxis (Mod to Severe); Short breath (Mod to Severe); Angioedema (Mod to Severe))    Intolerances      MEDICATIONS  (STANDING):  ascorbic acid 500 milliGRAM(s) Oral daily  aspirin  chewable 81 milliGRAM(s) Oral daily  atorvastatin 80 milliGRAM(s) Oral at bedtime  bisacodyl Suppository 10 milliGRAM(s) Rectal once  buMETAnide Infusion 0.5 mG/Hr (2.5 mL/Hr) IV Continuous <Continuous>  calamine/zinc oxide Lotion 1 Application(s) Topical three times a day  dextrose 10% Bolus 125 milliLiter(s) IV Bolus once  dextrose 5%. 1000 milliLiter(s) (100 mL/Hr) IV Continuous <Continuous>  dextrose 5%. 1000 milliLiter(s) (50 mL/Hr) IV Continuous <Continuous>  dextrose 50% Injectable 12.5 Gram(s) IV Push once  dextrose 50% Injectable 50 milliLiter(s) IV Push once  dextrose 50% Injectable 25 Gram(s) IV Push once  ferrous    sulfate 325 milliGRAM(s) Oral two times a day  glucagon  Injectable 1 milliGRAM(s) IntraMuscular once  heparin   Injectable 5000 Unit(s) SubCutaneous every 8 hours  insulin glargine Injectable (LANTUS) 12 Unit(s) SubCutaneous at bedtime  insulin lispro (ADMELOG) corrective regimen sliding scale   SubCutaneous three times a day before meals  insulin lispro (ADMELOG) corrective regimen sliding scale   SubCutaneous at bedtime  insulin regular  human recombinant 5 Unit(s) IV Push once  lactated ringers. 500 milliLiter(s) (100 mL/Hr) IV Continuous <Continuous>  levothyroxine 75 MICROGram(s) Oral daily  multivitamin 1 Tablet(s) Oral daily  mupirocin 2% Nasal 1 Application(s) Both Nostrils two times a day  nystatin    Suspension 207704 Unit(s) Oral four times a day  nystatin/triamcinolone Cream 1 Application(s) Topical every 12 hours  pantoprazole    Tablet 40 milliGRAM(s) Oral before breakfast  polyethylene glycol 3350 17 Gram(s) Oral two times a day  senna 2 Tablet(s) Oral at bedtime  sodium zirconium cyclosilicate 10 Gram(s) Oral once  triamcinolone 0.1% Ointment 1 Application(s) Topical two times a day    MEDICATIONS  (PRN):  dextrose Oral Gel 15 Gram(s) Oral once PRN Blood Glucose LESS THAN 70 milliGRAM(s)/deciliter  diphenhydrAMINE Injectable 50 milliGRAM(s) IV Push every 4 hours PRN Rash and/or Itching  metoclopramide Injectable 10 milliGRAM(s) IV Push every 6 hours PRN nausea vomiting      FAMILY HISTORY:  Family history of CVA  mom, age 57        Daily     Daily Weight in k.9 (2024 04:14)  Vital Signs Last 24 Hrs  T(C): 36.9 (2024 04:14), Max: 36.9 (2024 12:21)  T(F): 98.5 (2024 04:14), Max: 98.5 (2024 04:14)  HR: 82 (2024 04:14) (74 - 99)  BP: 106/56 (2024 04:14) (92/56 - 106/66)  BP(mean): 79 (2024 12:21) (79 - 79)  RR: 18 (2024 04:14) (17 - 18)  SpO2: 97% (2024 04:14) (93% - 100%)    Parameters below as of 2024 04:14  Patient On (Oxygen Delivery Method): nasal cannula  O2 Flow (L/min): 3      Physical Exam:  General:	                   laying in bed  HEENT:	                    Normocephalic atraumatic, Nasal canula in place  Respiratory	breathing comfortably  Extremities	+edema on b/l legs, feet  Skin		Diffuse erythema (torso, extremities). No pustules notes. +psoriasis plaques. Denuded skin on upper thighs   Neurologic	Normal:      Grossly Intact    Lab Results:                        9.9    9.30  )-----------( 129      ( 2024 09:21 )             33.1                         11.5   12.90 )-----------( 159      ( 2024 11:00 )             37.0     2024 09:24    128    |  89     |  88     ----------------------------<  228    5.5     |  26     |  4.25   2024 11:00    133    |  91     |  77     ----------------------------<  121    5.0     |  30     |  3.62     Ca    8.6        2024 09:24  Ca    8.6        2024 11:00  Phos  4.2       2024 09:24  Phos  3.6       2024 11:00  Mg     2.2       2024 09:24  Mg     2.1       2024 11:00    TPro  6.0    /  Alb  2.8    /  TBili  1.1    /  DBili  x      /  AST  9      /  ALT  9      /  AlkPhos  50     2024 09:24  TPro  6.3    /  Alb  2.9    /  TBili  1.1    /  DBili  x      /  AST  14     /  ALT  7      /  AlkPhos  47     2024 11:00    LIVER FUNCTIONS - ( 2024 09:24 )  Alb: 2.8 g/dL / Pro: 6.0 g/dL / ALK PHOS: 50 U/L / ALT: 9 U/L / AST: 9 U/L / GGT: x         LIVER FUNCTIONS - ( 2024 11:00 )  Alb: 2.9 g/dL / Pro: 6.3 g/dL / ALK PHOS: 47 U/L / ALT: 7 U/L / AST: 14 U/L / GGT: x           Urinalysis Basic - ( 2024 09:24 )    Color: x / Appearance: x / SG: x / pH: x  Gluc: 228 mg/dL / Ketone: x  / Bili: x / Urobili: x   Blood: x / Protein: x / Nitrite: x   Leuk Esterase: x / RBC: x / WBC x   Sq Epi: x / Non Sq Epi: x / Bacteria: x

## 2024-04-19 NOTE — PROGRESS NOTE ADULT - PROBLEM SELECTOR PLAN 3
mod to severe AS    Plan:  -structural cards following - CTA imagings performed for TAVR  -will need PPM (tentative plan for 4/22) prior to TAVR per EP due to conduction disease,

## 2024-04-19 NOTE — PROGRESS NOTE ADULT - SUBJECTIVE AND OBJECTIVE BOX
*****Structural Heart Team*****    Subjective:    Patient resting comfortably in bed, offering no complaints.    PAST MEDICAL & SURGICAL HISTORY:  Hypertension    Diabetes    Lymphedema    H/O left bundle branch block    History of left bundle branch block (LBBB)    Systolic heart failure, chronic    Type 1 diabetes    H/O cataract  2020    History of surgical removal of meniscus of knee  left in 1971    Frozen shoulder  2000    H/O Achilles tendon repair  lengthened bilaterally, 2000    Fractured skull  1968          T(C): 36.8 (04-19-24 @ 12:17), Max: 36.8 (04-18-24 @ 17:16)  HR: 109 (04-19-24 @ 12:17) (78 - 109)  BP: 97/60 (04-19-24 @ 12:17) (92/58 - 111/56)  RR: 18 (04-19-24 @ 12:17) (16 - 19)  SpO2: 96% (04-19-24 @ 12:17) (95% - 100%)  Wt(kg): --  04-18 @ 07:01  -  04-19 @ 07:00  --------------------------------------------------------  IN: 150 mL / OUT: 701 mL / NET: -551 mL    04-19 @ 07:01  -  04-19 @ 13:48  --------------------------------------------------------  IN: 240 mL / OUT: 0 mL / NET: 240 mL      MEDICATIONS  (STANDING):  ascorbic acid 500 milliGRAM(s) Oral daily  aspirin  chewable 81 milliGRAM(s) Oral daily  atorvastatin 80 milliGRAM(s) Oral at bedtime  calamine/zinc oxide Lotion 1 Application(s) Topical three times a day  cetirizine 10 milliGRAM(s) Oral daily  diphenhydrAMINE Injectable 50 milliGRAM(s) IntraMuscular every 6 hours  ferrous    sulfate 325 milliGRAM(s) Oral two times a day  glucagon  Injectable 1 milliGRAM(s) IntraMuscular once  heparin   Injectable 5000 Unit(s) SubCutaneous every 8 hours  insulin glargine Injectable (LANTUS) 15 Unit(s) SubCutaneous at bedtime  insulin lispro (ADMELOG) corrective regimen sliding scale   SubCutaneous at bedtime  insulin lispro (ADMELOG) corrective regimen sliding scale   SubCutaneous three times a day before meals  insulin lispro Injectable (ADMELOG) 2 Unit(s) SubCutaneous three times a day before meals  levothyroxine 75 MICROGram(s) Oral daily  multivitamin 1 Tablet(s) Oral daily  mupirocin 2% Nasal 1 Application(s) Both Nostrils two times a day  nystatin    Suspension 860094 Unit(s) Oral four times a day  nystatin/triamcinolone Cream 1 Application(s) Topical every 12 hours  petrolatum white Ointment 1 Application(s) Topical three times a day  senna 2 Tablet(s) Oral at bedtime  triamcinolone 0.1% Ointment 1 Application(s) Topical two times a day    MEDICATIONS  (PRN):  dextrose Oral Gel 15 Gram(s) Oral once PRN Blood Glucose LESS THAN 70 milliGRAM(s)/deciliter  metoclopramide Injectable 10 milliGRAM(s) IV Push every 6 hours PRN nausea vomiting  polyethylene glycol 3350 17 Gram(s) Oral two times a day PRN Constipation  sodium chloride 0.9% lock flush 10 milliLiter(s) IV Push every 1 hour PRN Pre/post blood products, medications, blood draw, and to maintain line patency      Review of Symptoms:  Constitutional: Awake, Alert, Follows commands  Respiratory: + CURTIS  Cardiac: Denies CP, Denies Palpitations  Gastrointestinal: Denies Pain, Denies N/V, tolerating po intake  Vascular: Negative  Extremities: + Edema, No joint pain or swelling  Neurological: Negative  Endocrine: No heat or cold intolerance, No excessive thirst  Heme/Onc: Negative    Exam:  General: A/Ox3, DOMINIC, NAD  HEENT: Supple, No JVD, Trachea midline, no masses  Pulmonary: CTAB, = Chest Excursion, no accessory muscle use  Cor: S1S2, RRR, II/VI STEVE  ECG: SR  Groin/Wound: B/L LE wrapped  Gastrointestinal: Soft, NT/ND, + Bowel Sounds  Neuro: = motor and sensory B/L, No focal deficits  Vascular: + 2+ pedal Edema B/L  Extremities: No joint pain or swelling  Skin: Warm/Dry/Normal color, Normal turgor, no rashes                          10.3   8.82  )-----------( 119      ( 19 Apr 2024 06:45 )             33.1   04-19    131<L>  |  89<L>  |  79<H>  ----------------------------<  298<H>  5.1   |  26  |  4.35<H>    Ca    8.8      19 Apr 2024 06:45  Phos  3.8     04-19  Mg     2.4     04-19    TPro  6.2  /  Alb  2.9<L>  /  TBili  1.1  /  DBili  x   /  AST  12  /  ALT  7<L>  /  AlkPhos  56  04-19      Imaging Reviewed:    Echocardiogram:    Cardiac Catheterization:        Assesment/Plan:  72 y/o female with Severe Aortic Stenosis, LBBB/ Bradycardia, Adrenal Nodules, Acute on Chronic Renal Injury.    1.) Severe Aortic Stenosis: Will discuss with Dr. Martell, but Cardiac Cath not likely needed. CT complete We will discuss tentative dates for TAVR tentatiely on Wednesday of next week after PPM placed.  2.) LBBB/Syncope: Patient tentatively scheduled for PPM this coming Monday.   3.) Adrenal Lesions: Evaluation by Endocrinology appreciated. Workup  and any repeat imaging to be done will be directed by them, likely as an outpatient.  4.) Acute on Chronic Renal Injury: Continue to monitor Creatinine. Monitoring and RRT necessity to be determined by Dr. Merino.  5.) Rash: Patient apparently has generalized rash after IV contrast given. Would pre treat patient with Prednisone Protocol prior to any further IV Contrast administration  6.) OOB to chair.    Discussed [place]  [id number]

## 2024-04-19 NOTE — PROGRESS NOTE ADULT - PROBLEM SELECTOR PLAN 4
TTE (4/2024) LVEF 30% w mod LVH, mildly reduced RV, AS (0.61 cm2), mod pHTN.   On home Torsemide 40 mg daily, Toprol  mg daily, Farxiga 5mg daily (never took due to concerns for side effects). Follows w Dr. Cherise rosas eventual plan to start further GDMT as renally tolerated    - strict I/Os, daily weights, fluid restrict 1.5L/day  - dc'd home torsemide 40mg daily  - held metoprolol iso hypotension

## 2024-04-19 NOTE — PROGRESS NOTE ADULT - ATTENDING COMMENTS
Agree with assessment and plan as above by Dr. Astudillo. Reviewed all pertinent labs, glucose values, and imaging studies. Modifications made as indicated above. Pt. with increased po intake. Can increase basal bolus to Lantus 18 units qhs and Admelog 4 units qac. Follow-up dex suppression test, plasma metanephrines, and renin level.     Tay Ross D.O  [phone]
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
## 2024-04-19 NOTE — PROGRESS NOTE ADULT - PROBLEM SELECTOR PLAN 6
CTA CAP incidentally noted for L adrenal thickened w a 1.2 x 0.8 cm left adrenal nodule is seen.    - endo consulted; recs appreciated  - f/u metanephrine, aldosterone, renin  - 1mg IV dexamethasone administered 4/17 10PM, f/u 8AM serum cortisol CTA CAP incidentally noted for L adrenal thickened w a 1.2 x 0.8 cm left adrenal nodule is seen.  - 1mg IV dexamethasone administered 4/17 10PM, f/u 8AM serum cortisol wnl    Plan:    - endo consulted; recs appreciated  - f/u metanephrine, aldosterone, renin

## 2024-04-19 NOTE — PROGRESS NOTE ADULT - NUTRITIONAL ASSESSMENT
This patient has been assessed with a concern for Malnutrition and has been determined to have a diagnosis/diagnoses of Morbid obesity (BMI > 40).    This patient is being managed with:   Diet NPO-  NPO for Procedure/Test     NPO Start Date: 21-Apr-2024   NPO Start Time: 23:59  Except Medications  Entered: Apr 19 2024 11:14AM    Diet Regular-  Consistent Carbohydrate {Evening Snack} (CSTCHOSN)  DASH/TLC {Sodium & Cholesterol Restricted} (DASH)  1500mL Fluid Restriction ([id number])  Entered: Apr 19 2024  7:44AM

## 2024-04-19 NOTE — PROGRESS NOTE ADULT - ASSESSMENT
73F w HFrEF (EF 30%), mod AS, known LBBB, HTN, IDDM1, CKD, hypothyroidism, chronic lymphedema, p/w 1 episode of syncope with R arm jerking.     #Syncope-Aortic Stenosis  - Episode after exertion  - Known Aortic Stenosis likely Severe in setting of decreased LVEF  - TAVR likely late next week     #Chronic Systolic HF (EF 30%), mod to severe AS, HTN, LBBB, Lymphedema   - Has known systolic HF and AS.    - Repeat TTE showed EF 30%, mild-mod LVH, mildly reduced RVF, mod-severe AS (.61 cmsq), mod pHTN  - Had cath 2022-Non obstructive CAD  - On home Torsemide 20 mg daily, Eplerenone 25 mg daily, and Toprol  mg daily  - Compliant with all meds including Torsemide, though, dose was reduced to Furosemide 40mg once daily.   - proBNP 51K from 80k.   - Consideration for TAVR  - Was on bumex gtt but then overnight 4/15 developed a fever to 102F and became hypotensive overnight. Bumex gtt stopped, given 250cc bolus. BPs remain low. UOP has dropped. I suspect she is intravascularly depleted.   - As per ID team, fever though to be 2/2 allergic rxn to contrast, rec to hold abx    # Acute kidney injury superimposed on CKD.   - now with worsening Cr  - HD initiated  - vol removal with HD     #LBBB  Known LBBB  Noted intermittent Wenkebach on telemetry with bradycardia and 2:1 AV conduction  - EP has been consulted. For possible CRT-D vs micra next week  - BB on hold    Will continue to follow closely.

## 2024-04-19 NOTE — PROGRESS NOTE ADULT - SUBJECTIVE AND OBJECTIVE BOX
[facility]-- WOUND TEAM -- FOLLOW UP NOTE  --------------------------------------------------------------------------------    24 hour events/subjective:          Diet:  Diet, NPO:   NPO for Procedure/Test     NPO Start Date: 21-Apr-2024,   NPO Start Time: 23:59  Except Medications (04-19-24 @ 11:14)  Diet, Regular:   Consistent Carbohydrate Evening Snack (CSTCHOSN)  DASH/TLC Sodium & Cholesterol Restricted (DASH)  1500mL Fluid Restriction ([id number]) (04-19-24 @ 07:44)      ROS: General/ SKIN/ MSK see HPI  all other systems negative      ALLERGIES & MEDICATIONS  --------------------------------------------------------------------------------  Allergies  contrast media (iodine-based) (Fever; Vomiting; Flushing; Hypotension; Rash; Stomach Upset)  Ativan (Rash; Urticaria)  penicillins (Hives (Mod to Severe); Anaphylaxis (Mod to Severe); Short breath (Mod to Severe); Angioedema (Mod to Severe))          STANDING INPATIENT MEDICATIONS  ascorbic acid 500 milliGRAM(s) Oral daily  aspirin  chewable 81 milliGRAM(s) Oral daily  atorvastatin 80 milliGRAM(s) Oral at bedtime  calamine/zinc oxide Lotion 1 Application(s) Topical three times a day  cetirizine 10 milliGRAM(s) Oral daily  chlorhexidine 4% Liquid 1 Application(s) Topical <User Schedule>  dextrose 10% Bolus 125 milliLiter(s) IV Bolus once  dextrose 5%. 1000 milliLiter(s) IV Continuous <Continuous>  dextrose 5%. 1000 milliLiter(s) IV Continuous <Continuous>  dextrose 50% Injectable 12.5 Gram(s) IV Push once  dextrose 50% Injectable 25 Gram(s) IV Push once  diphenhydrAMINE Injectable 50 milliGRAM(s) IntraMuscular every 6 hours  ferrous    sulfate 325 milliGRAM(s) Oral two times a day  glucagon  Injectable 1 milliGRAM(s) IntraMuscular once  heparin   Injectable 5000 Unit(s) SubCutaneous every 8 hours  insulin glargine Injectable (LANTUS) 15 Unit(s) SubCutaneous at bedtime  insulin lispro (ADMELOG) corrective regimen sliding scale   SubCutaneous at bedtime  insulin lispro (ADMELOG) corrective regimen sliding scale   SubCutaneous three times a day before meals  insulin lispro Injectable (ADMELOG) 2 Unit(s) SubCutaneous three times a day before meals  levothyroxine 75 MICROGram(s) Oral daily  multivitamin 1 Tablet(s) Oral daily  mupirocin 2% Nasal 1 Application(s) Both Nostrils two times a day  nystatin    Suspension 310894 Unit(s) Oral four times a day  nystatin/triamcinolone Cream 1 Application(s) Topical every 12 hours  petrolatum white Ointment 1 Application(s) Topical three times a day  senna 2 Tablet(s) Oral at bedtime  triamcinolone 0.1% Ointment 1 Application(s) Topical two times a day      PRN INPATIENT MEDICATION  dextrose Oral Gel 15 Gram(s) Oral once PRN  metoclopramide Injectable 10 milliGRAM(s) IV Push every 6 hours PRN  polyethylene glycol 3350 17 Gram(s) Oral two times a day PRN  sodium chloride 0.9% lock flush 10 milliLiter(s) IV Push every 1 hour PRN        VITALS/PHYSICAL EXAM  --------------------------------------------------------------------------------  T(C): 36.8 (04-19-24 @ 12:17), Max: 36.8 (04-18-24 @ 17:16)  HR: 109 (04-19-24 @ 12:17) (78 - 109)  BP: 97/60 (04-19-24 @ 12:17) (92/58 - 111/56)  RR: 18 (04-19-24 @ 12:17) (16 - 19)  SpO2: 96% (04-19-24 @ 12:17) (95% - 100%)  Wt(kg): --                  LABS/ CULTURES/ RADIOLOGY:              10.3   8.82  >-----------<  119      [04-19-24 @ 06:45]              33.1     131  |  89  |  79  ----------------------------<  298      [04-19-24 @ 06:45]  5.1   |  26  |  4.35        Ca     8.8     [04-19-24 @ 06:45]      Mg     2.4     [04-19-24 @ 06:45]      Phos  3.8     [04-19-24 @ 06:45]    TPro  6.2  /  Alb  2.9  /  TBili  1.1  /  DBili  x   /  AST  12  /  ALT  7   /  AlkPhos  56  [04-19-24 @ 06:45]      A1C with Estimated Average Glucose Result: 7.4 % (03-30-24 @ 08:21)  A1C with Estimated Average Glucose Result: 6.8 % (01-10-24 @ 08:37)      CAPILLARY BLOOD GLUCOSE  POCT Blood Glucose.: 232 mg/dL (19 Apr 2024 12:34)  POCT Blood Glucose.: 321 mg/dL (19 Apr 2024 09:02)  POCT Blood Glucose.: 220 mg/dL (18 Apr 2024 21:44)  POCT Blood Glucose.: 243 mg/dL (18 Apr 2024 17:29)  POCT Blood Glucose.: 258 mg/dL (18 Apr 2024 13:24)    Culture - Blood (collected 04-16-24 @ 00:28)  Source: .Blood Blood-Peripheral  Preliminary Report (04-19-24 @ 04:01):    No growth at 72 Hours    Culture - Blood (collected 04-16-24 @ 00:20)  Source: .Blood Blood-Peripheral  Preliminary Report (04-19-24 @ 04:01):    No growth at 72 Hours   Vaccine Information Statement(s) or the Emergency Use Authorization was given today. This has been reviewed, questions answered, and verbal consent given by Patient for injection(s) and administration of Influenza (Inactivated).      Patient tolerated without incident. See immunization grid for documentation.     [facility]-- WOUND TEAM -- FOLLOW UP NOTE  --------------------------------------------------------------------------------    24 hour events/subjective:    Afebrile  Tolerating po w/o n/v  tolerating multilayer wrap     legs w/ less swelling & less heavy     some drainage     no pain or odor      Diet:  Diet, NPO:   NPO for Procedure/Test     NPO Start Date: 21-Apr-2024,   NPO Start Time: 23:59  Except Medications (04-19-24 @ 11:14)  Diet, Regular:   Consistent Carbohydrate Evening Snack (CSTCHOSN)  DASH/TLC Sodium & Cholesterol Restricted (DASH)  1500mL Fluid Restriction ([id number]) (04-19-24 @ 07:44)      ROS: General/ SKIN/ MSK see HPI  all other systems negative      ALLERGIES & MEDICATIONS  --------------------------------------------------------------------------------  Allergies  contrast media (iodine-based) (Fever; Vomiting; Flushing; Hypotension; Rash; Stomach Upset)  Ativan (Rash; Urticaria)  penicillins (Hives (Mod to Severe); Anaphylaxis (Mod to Severe); Short breath (Mod to Severe); Angioedema (Mod to Severe))          STANDING INPATIENT MEDICATIONS  ascorbic acid 500 milliGRAM(s) Oral daily  aspirin  chewable 81 milliGRAM(s) Oral daily  atorvastatin 80 milliGRAM(s) Oral at bedtime  calamine/zinc oxide Lotion 1 Application(s) Topical three times a day  cetirizine 10 milliGRAM(s) Oral daily  chlorhexidine 4% Liquid 1 Application(s) Topical <User Schedule>  dextrose 10% Bolus 125 milliLiter(s) IV Bolus once  dextrose 5%. 1000 milliLiter(s) IV Continuous <Continuous>  dextrose 5%. 1000 milliLiter(s) IV Continuous <Continuous>  dextrose 50% Injectable 12.5 Gram(s) IV Push once  dextrose 50% Injectable 25 Gram(s) IV Push once  diphenhydrAMINE Injectable 50 milliGRAM(s) IntraMuscular every 6 hours  ferrous    sulfate 325 milliGRAM(s) Oral two times a day  glucagon  Injectable 1 milliGRAM(s) IntraMuscular once  heparin   Injectable 5000 Unit(s) SubCutaneous every 8 hours  insulin glargine Injectable (LANTUS) 15 Unit(s) SubCutaneous at bedtime  insulin lispro (ADMELOG) corrective regimen sliding scale   SubCutaneous at bedtime  insulin lispro (ADMELOG) corrective regimen sliding scale   SubCutaneous three times a day before meals  insulin lispro Injectable (ADMELOG) 2 Unit(s) SubCutaneous three times a day before meals  levothyroxine 75 MICROGram(s) Oral daily  multivitamin 1 Tablet(s) Oral daily  mupirocin 2% Nasal 1 Application(s) Both Nostrils two times a day  nystatin    Suspension 151618 Unit(s) Oral four times a day  nystatin/triamcinolone Cream 1 Application(s) Topical every 12 hours  petrolatum white Ointment 1 Application(s) Topical three times a day  senna 2 Tablet(s) Oral at bedtime  triamcinolone 0.1% Ointment 1 Application(s) Topical two times a day      PRN INPATIENT MEDICATION  dextrose Oral Gel 15 Gram(s) Oral once PRN  metoclopramide Injectable 10 milliGRAM(s) IV Push every 6 hours PRN  polyethylene glycol 3350 17 Gram(s) Oral two times a day PRN  sodium chloride 0.9% lock flush 10 milliLiter(s) IV Push every 1 hour PRN        VITALS/PHYSICAL EXAM  --------------------------------------------------------------------------------  T(C): 36.8 (04-19-24 @ 12:17), Max: 36.8 (04-18-24 @ 17:16)  HR: 109 (04-19-24 @ 12:17) (78 - 109)  BP: 97/60 (04-19-24 @ 12:17) (92/58 - 111/56)  RR: 18 (04-19-24 @ 12:17) (16 - 19)  SpO2: 96% (04-19-24 @ 12:17) (95% - 100%)  Wt(kg): --      NAD,   A&Ox3, MO  WD/ WN/ WG,    [facility] P500 bed  HEENT:  NC/AT, EOMI, sclera clear, mucosa moist, throat clear, trachea midline, neck supple  Respiratory: nonlabored w/ equal chest rise  Gastrointestinal: soft NT/ND   Neurology:   strength & sensation grossly intact,   Psych: calm/ appropriate  Musculoskeletal: FROM, no deformities/ contractures  Vascular: BLE equally warm,  no cyanosis, clubbing, nor acute ischemia       BLE edema equal       BLE DP pulses palpable       BLE hemosiderin staining & lymphedema improving           soft plaques easily lifted w/ mechanical debridement      w/o open skin, blistering or drainage  No odor, erythema, increased warmth, tenderness, induration, fluctuance, nor crepitus  Skin:  thin, dry, pale, frail,  scattered psoriatic plaques throughout extremities and torso  bilateral buttock w/ faint erythema  w/o blistering  or  drainage  No odor, erythema, increased warmth, tenderness, induration, fluctuance, nor crepitus      LABS/ CULTURES/ RADIOLOGY:              10.3   8.82  >-----------<  119      [04-19-24 @ 06:45]              33.1     131  |  89  |  79  ----------------------------<  298      [04-19-24 @ 06:45]  5.1   |  26  |  4.35        Ca     8.8     [04-19-24 @ 06:45]      Mg     2.4     [04-19-24 @ 06:45]      Phos  3.8     [04-19-24 @ 06:45]    TPro  6.2  /  Alb  2.9  /  TBili  1.1  /  DBili  x   /  AST  12  /  ALT  7   /  AlkPhos  56  [04-19-24 @ 06:45]      A1C with Estimated Average Glucose Result: 7.4 % (03-30-24 @ 08:21)  A1C with Estimated Average Glucose Result: 6.8 % (01-10-24 @ 08:37)      CAPILLARY BLOOD GLUCOSE  POCT Blood Glucose.: 232 mg/dL (19 Apr 2024 12:34)  POCT Blood Glucose.: 321 mg/dL (19 Apr 2024 09:02)  POCT Blood Glucose.: 220 mg/dL (18 Apr 2024 21:44)  POCT Blood Glucose.: 243 mg/dL (18 Apr 2024 17:29)  POCT Blood Glucose.: 258 mg/dL (18 Apr 2024 13:24)    Culture - Blood (collected 04-16-24 @ 00:28)  Source: .Blood Blood-Peripheral  Preliminary Report (04-19-24 @ 04:01):    No growth at 72 Hours    Culture - Blood (collected 04-16-24 @ 00:20)  Source: .Blood Blood-Peripheral  Preliminary Report (04-19-24 @ 04:01):    No growth at 72 Hours

## 2024-04-19 NOTE — CHART NOTE - NSCHARTNOTEFT_GEN_A_CORE
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 Some patients have systemic findings, including transaminitis. The rash typically resolves in less than 2 weeks and will then desquamate after resolution.   - initially was favoring from pantoprazole vs contrast, (diffuse redness of the skin affecting more than 90% of the body surface), other common causes of erythroderma in adults are psoriasis, atopic dermatitis -- less favored are cutaneous T-cell lymphoma (CTCL), pityriasis rubra pilaris (unlikely in this patient)  - Patient is continuing to improve with no skin denuding.  - f/u final read of biopsy 4/17  - for the intertriginous areas please cont nystatin cream BID mixed with triamcinolone 0.1% cream BID  - for the rest of the body please cont triamcinolone 0.1% ointment BID to affected areas (never to the face/groin/skin folds) for up to 2 weeks on, then 1 week off. Please dispense multiple tubes to cover body surface area.  - cont trend daily CBC w/ diff, monitor fever curve    #psoriasis  - for the psoriasis please – CONT  triamcinolone 0.1% ointment BID to affected areas (never to the face/groin/skin folds) for up to 2 weeks on, then 1 week off. Please dispense multiple tubes to cover body surface area.    #lymphedema  - agree with wound care, compression, elevation recommended    #dermatitis favor oral candidiasis but atypical  - white plaques in mouth  - f/u fungal culture  - please CONT nystatin swish and swallow 4x/day      Discussed with primary team.  Discussed with attending, Dr. Heron Cross MD   Resident Physician, PGY2  [facility]  Office: [phone]  Pager: [phone]   **Please page with 10-[phone] callback # for further related questions.**

## 2024-04-19 NOTE — PROGRESS NOTE ADULT - ASSESSMENT
74yo woman with PMHx of HFrEF (EF 30%), mod AS, known 1st degree AVB and LBBB, HTN, IDDM1, CKD, hypothyroidism, chronic lymphedema, suspected OHS/LELIA on 3L NC home O2 p/w 1 episode of syncope. Recent admission at [facility] (3/29-4/5) for ADHF and DUNCAN s/p diuresis.   Pt states she does not remember her syncopal episode. She remembers walking to the car and getting into it. She denies any preceding symptoms. She was told that she "blacked out" and was not responding for 10-15 seconds with arm shaking. There is documentation that pt was dyspneic upon getting into car after walking but pt did not recall that on EP consultation 4/15.  Pt went to her cardiologist Dr. Nathan who recommended pt to come to ED.    EKG consistent with sinus bradycardia at 58bpm, 1st degree AVB (Sharath 318ms) with LBBB (QRSd 176). Early AM 4/14, she was noted to have 2:1 AV block. Pt also with e/o Wenckebach several times throughout the day on 4/14. She is being worked up for her aortic stenosis, likely severe in setting of decreased LVEF. She has significant LE edema and history of lymphedema with rash on lower legs. She was on Toprol XL 100mg at home but has not taken in since 4/12.    1) Syncope  2) 2:1 AV block with known 1st degree AVB (Sharath 318ms) with LBBB (QRSd 176)  3) HFrEF with LVEF 30%  4) Aortic stenosis, likely severe in setting of decreased LVEF  5) Lymphedema with significant LE edema and rash  6) Fever 4/16, undergoing infectious workup    Recommendations:  - Likely undergoing TAVR next week per structural heart team  - Plan for leadless PPM implant on Monday 4/22  - NPO after midnight prior to procedure  - Active T&S x2, coags prior to PPM  - ID following for potential infectious etiology of fever. Likely allergic reaction to contrast. Off abx. No further fevers noted  - BB on hold  -  Pt may warrant CRT-D placement noting conduction disease and cardiomyopathy with LVEF 30% and LBBB with QRSd 176. However, pt is not a candidate for CRT at this time noting infectious risk due to LE swelling/cellulitis risks in the setting of likely ESRD. Only safe option would be AV Micra as a temporizing measure  -If pt is able to eventually undergo Micra AV placement and then TAVR, she could be reassessed down the line for the need of transvenous device with conduction sytem or CRT pacing if needed    Plan discussed with Dr. Lopez       72yo woman with PMHx of HFrEF (EF 30%), mod AS, known 1st degree AVB and LBBB, HTN, IDDM1, CKD, hypothyroidism, chronic lymphedema, suspected OHS/LELIA on 3L NC home O2 p/w 1 episode of syncope. Recent admission at [facility] (3/29-4/5) for ADHF and DUNCAN s/p diuresis.   Pt states she does not remember her syncopal episode. She remembers walking to the car and getting into it. She denies any preceding symptoms. She was told that she "blacked out" and was not responding for 10-15 seconds with arm shaking. There is documentation that pt was dyspneic upon getting into car after walking but pt did not recall that on EP consultation 4/15.  Pt went to her cardiologist Dr. Nathan who recommended pt to come to ED.    EKG consistent with sinus bradycardia at 58bpm, 1st degree AVB (Sharath 318ms) with LBBB (QRSd 176). Early AM 4/14, she was noted to have 2:1 AV block. Pt also with e/o Wenckebach several times throughout the day on 4/14. She is being worked up for her aortic stenosis, likely severe in setting of decreased LVEF. She has significant LE edema and history of lymphedema with rash on lower legs. She was on Toprol XL 100mg at home but has not taken in since 4/12.    1) Syncope  2) 2:1 AV block with known 1st degree AVB (Sharath 318ms) with LBBB (QRSd 176)  3) HFrEF with LVEF 30%  4) Aortic stenosis, likely severe in setting of decreased LVEF  5) Lymphedema with significant LE edema and rash  6) Fever 4/16, undergoing infectious workup    Recommendations:  - Likely undergoing TAVR next week per structural heart team  - Plan for leadless PPM implant on Monday 4/22  - NPO after midnight prior to procedure  - Active T&S x2, coags prior to PPM  - Hold morning dose of SQ heparin on day of procedure  - ID following for potential infectious etiology of fever. Likely allergic reaction to contrast. Off abx. No further fevers noted  - BB on hold  -  Pt may warrant CRT-D placement noting conduction disease and cardiomyopathy with LVEF 30% and LBBB with QRSd 176. However, pt is not a candidate for CRT at this time noting infectious risk due to LE swelling/cellulitis risks in the setting of likely ESRD. Only safe option would be AV Micra as a temporizing measure  -If pt is able to eventually undergo Micra AV placement and then TAVR, she could be reassessed down the line for the need of transvenous device with conduction sytem or CRT pacing if needed    Plan discussed with Dr. Lopez

## 2024-04-19 NOTE — PROGRESS NOTE ADULT - ASSESSMENT
73F w HFrEF (EF 30%), mod AS, known LBBB, HTN, IDDM1, CKD, hypothyroidism, chronic lymphedema, p/w 1 episode of syncope with R arm jerking, likely 2/2 severe symptomatic AS. CTA imagings performed for TAVR eval. C/b febrile episode with hypotension, likely allergic contrast reaction vs infectious, pending workup. C/b renal failure requiring HD. Tentative plan for AV Micra 4/22 prior to TAVR.

## 2024-04-19 NOTE — PROGRESS NOTE ADULT - NUTRITIONAL ASSESSMENT
This patient has been assessed with a concern for Malnutrition and has been determined to have a diagnosis/diagnoses of Morbid obesity (BMI > 40).    This patient is being managed with:   Diet Regular-  Entered: Apr 18 2024  6:55PM

## 2024-04-19 NOTE — PROGRESS NOTE ADULT - PROBLEM SELECTOR PLAN 10
TSH mildly elevated 4.79, FT4 1.5    Plan:  - endocrine consulted; recs appreciated  - c/w home levothyroxine  - Recheck TSH and FT4 outpatient once stable

## 2024-04-19 NOTE — PROGRESS NOTE ADULT - ASSESSMENT
73F w HFrEF (EF 30%), mod AS, known LBBB, HTN, IDDM1, CKD, hypothyroidism, chronic lymphedema, suspected OHS/LELIA on 3L NC home O2 p/w 1 episode of syncope. Recent admission at [facility] (3/29-4/5) for ADHF and DUNCAN s/p diuresis, discharged with new home O2 3L NC suspecting OHS/LELIA pending outpatient w/u. Since discharge a week ago, she has been staying at home with   Pt had sob walking to car. Once in car, she was still breathing heavily. Partner noticed pt was not responding to verbal stimuli for 10-15sec and witnessed R arm jerking. Pt gained consciousness quickly without postictal symptoms. Pt went to Cardiologist Dr. Nathan who recommended pt to come to ED. No fever, cp, abd pain, n/v. Leg swelling is improved from prior. Pt on torsemide 40mg which she has been taking. Pt was discharged on 3LNC which she is currently on. Patient reports she did not take Farxiga that she was discharged on as she was concerned about side effects.     In ED, patient was found afebrile, hemodynamically stable, breathing well on 3L NC. Initial labs notable for mild hyponatremia, DUNCAN on CKD, elevated proBNP and elevated pCO2 both improved from prior.  pt also noticed with abn creatinine      1- CKD IV  with DUNCAN   2- CHF   3- lymphedema   4- severe AS   5- syncope         suspect ATN from hypotension along with contrast nephropathy   creatinine worsening requiring renal replacement therapy, HD initiated 4/18  plan for HD today  k in range  ID following, no abx at this time  EP consulted for syncope, recommending device placement for bradycardia  structural heart team following, TAVR work up   derm following for rash  cont O2 and tele monitor   trend creatinine and electrolytes daily

## 2024-04-19 NOTE — PROGRESS NOTE ADULT - ASSESSMENT
73F w HFrEF (EF 30%), mod AS, known LBBB, HTN, IDDM1, CKD, hypothyroidism, chronic lymphedema, p/w 1 episode of syncope with R arm jerking, likely 2/2 severe symptomatic AS. Pending CTA imagings for TAVR eval, c/b DUNCAN on CKD, on bumex gtt.      Wound Consult requested to assist w/ management of Psoriasis  BLE Lymphedema  Incontinence Dermatitis Of buttocks     BLE Porfore  Appreciate Derm Consult for Psoriasis  BLE elevation & Compression  Moisturize intact skin w/ SWEEN cream BID  Nutrition Consult for optimization        encourage high quality protein, cynthia/ prosource, MVI & Vit C to promote wound healing  Hyperglycemia -  ADA diet and NPH & FS w/ ISS  Continue turning and positioning w/ offloading assistive devices as per protocol  Buttocks/ Sacrum Patito BID and prn soiling        Continue w/ attends under pads and Pericare as per protocol  Waffle Cushion to chair when oob to chair  Continue w/ low air loss pressure redistribution bed surface   Pt will need Group 2 mattress on hospital bed and ROHO cushion for wheel chair upon discharge home  Care as per medicine, will follow w/ you  Upon discharge f/u as outpatient at [facility] [address] [place] [address] [phone]  D/w team & RN & attng  Thank you for this consult  Staci Lau PA-C CWS [id number]  Nights/ Weekends/ Holidays please call:  General Surgery Consult pager ([phone]) for emergencies  Wound PT for multilayer leg wrapping or VAC issues (x [phone])   I spent 35minutes face to face w/ this pt of which more than 50% of the time was spent counseling & coordinating care of this pt.    73F w HFrEF (EF 30%), mod AS, known LBBB, HTN, IDDM1, CKD, hypothyroidism, chronic lymphedema, p/w 1 episode of syncope with R arm jerking, likely 2/2 severe symptomatic AS. Pending CTA imagings for TAVR eval, c/b DUNCAN on CKD, on bumex gtt.      Wound Consult requested to assist w/ management of Psoriasis  BLE Lymphedema  Incontinence Dermatitis Of buttocks     BLE Porfore as per protocol w/ wound PT  Appreciate Derm Consult for Psoriasis  BLE elevation & Compression  Moisturize intact skin w/ SWEEN cream BID  Nutrition Consult for optimization        encourage high quality protein, cynthia/ prosource, MVI & Vit C to promote wound healing  Hyperglycemia -  ADA diet and NPH & FS w/ ISS  Continue turning and positioning w/ offloading assistive devices as per protocol  Buttocks/ Sacrum Patito BID and prn soiling        Continue w/ attends under pads and Pericare as per protocol  Waffle Cushion to chair when oob to chair  Continue w/ low air loss pressure redistribution bed surface   Pt will need Group 2 mattress on hospital bed and ROHO cushion for wheel chair upon discharge home  Care as per medicine, will follow w/ you  Upon discharge f/u as outpatient at [facility] [address] [place], [place] [phone]  D/w team & RN & attng  Thank you for this consult  Staci Lau PA-C CWS [id number]  Nights/ Weekends/ Holidays please call:  General Surgery Consult pager ([phone]) for emergencies  Wound PT for multilayer leg wrapping or VAC issues (x [phone])   I spent 35minutes face to face w/ this pt of which more than 50% of the time was spent counseling & coordinating care of this pt.

## 2024-04-19 NOTE — PROGRESS NOTE ADULT - PROBLEM SELECTOR PLAN 11
Hospital Bundle    Fluids: FLUID RESTRICT 1.5L/day  Electrolytes: Replete K > 4, Mg > 2, Phos > 3  Nutrition: Diet: CC   PPX  ---VTE: HSQ  ---GI: c/w PPI  ---Resp: c/w home O2  Access: PIV  Code Status: pending further discussions  Dispo: pending clinical improvement

## 2024-04-19 NOTE — PROGRESS NOTE ADULT - ATTENDING COMMENTS
73F PMH HFrEF (LVEF 30%), moderate AS, HTN, DM1.5, CKD3, hypothyroidism, chronic lymphadema, presents with episode of syncope. Notably, patient was recently discharged from [facility] about a week prior to admission. On day of admission, patient was with episode of shortness of breath, then sat down in car had had witnessed syncopal episode which lasted 10-15 seconds. No postictal state or incontinence. Now on new HD and undergoing TAVR workup, ccb likely AGEP reaction to contrast.    #fever- pt with fever, hypotension and generalized flushing, n/v 4/15 after CT iv contrast- RRT called and pt treated with broad spec abx for presumed sepsis however clinical picture more consistent with contrast-induced AGEP vs. less likely allergic/anaphylactoid reaction. Now resolved and off abx.   - blood and urine cultures negative, abx stopped  - allergy consulted- recommends holding off steroids for now, f/u derm biopsy results before administering any further contrast loads  - cont benadryl 50mg iv q6 hours prn, zyrtec, topical triamcinolone/calamine  - n/v refractory to zofran, now seems improved on reglan    #Syncope- concern for severe AS  - Continue to monitor on tele, may warrant Zio patch as patient previously had halter, however no events recorded  - d/w structural, nephrology and patient- started on HD due to worsening DUNCAN, pt aware this may be long term HD  - will proceed with TAVR workup- pt will need AV micra PPM monday, then planned for TAVR next wed    #Acute CHF- pt still overloaded with massive LE edema and on 3L NC, however stable  - Strict IO, daily standing weights  - holding     #DUNCAN on CKD- worsening now, on bumex, hardy placed. Now planned for shiley placement at anticipate starting HD. Renal following    #Hyperglycemia, DMT2- intermittent episodes of hypoglycemia during N/V, now hyperglycemic again- pt on basal/bolus, adjustments per endocrine.    #adrenal thickening- endocrine consulted, recommends nodule risk stratification- will review existing scan with rads and advise if further imaging needed; should not require contrast.     plan d/w and agreed on by pt and partner at bedside    The necessity of the time spent during the encounter on this date of service was due to:   - Ordering, reviewing, and interpreting labs, testing, and imaging.  - Independently obtaining a review of systems and performing a physical exam  - Reviewing prior hospitalization and where necessary, outpatient records.  - Counselling and educating patient and family regarding interpretation of aforementioned items and plan of care.    Time-based billing (NON-critical care). Total minutes spent: 57

## 2024-04-19 NOTE — PROGRESS NOTE ADULT - PROBLEM SELECTOR PLAN 5
Unclear baseline SCr likely 2.1-2.2  Now with increasing Cr. and anuric    #AUR was found retaining w 400cc on bladder scan. Hardy placed.    Plan:  - trend BUN/SCr, avoid nephrotoxic, renally dose all meds  - strict I/Os  - Nephro following, consent for HD  - hardy placed for accurate I/O and AUR, maintain for now  - shiley access obtained 4/18 w IR, HD 4/18, output of 500cc Unclear baseline SCr likely 2.1-2.2  Now with increasing Cr. and anuric    #AUR was found retaining w 400cc on bladder scan. Hardy placed.    Plan:  - trend BUN/SCr, avoid nephrotoxic, renally dose all meds  - strict I/Os  - Nephro following, consent for HD  - hardy placed for accurate I/O and AUR, maintain for now  - shiley access obtained 4/18 w IR, HD 4/18, output of 500cc  - F/u nephro for HD plans

## 2024-04-19 NOTE — PROGRESS NOTE ADULT - ASSESSMENT
73F w HFrEF (EF 30%), mod AS, known LBBB, HTN, T1DM, CKD, hypothyroidism, chronic lymphedema, suspected OHS/LELIA on 3L NC home O2 p/w 1 episode of syncope with R arm jerking, likely 2/2 severe symptomatic AS. CTA imagings performed for TAVR eval, c/b DUNCAN on CKD. C/b febrile and hypotension, sepsis workup pending. CT c/a/p w/w/o IV contrast revealed incidental finding of L adrenal nodule.    #L adrenal nodule   Incidentally found on CT c/a/p w/wo IV contrast done for TAVR evaluation. The left adrenal gland is thickened and a 1.2 x 0.8 cm left adrenal nodule is seen. The right adrenal gland is normal.  Primary team discussed with radiology. HU of the adrenal nodule not able to be accurately determined due to motion artifact, also given imaging was taken at venous phase.     Reports no prior known hx of adrenal nodule.  Has HTN, normally well controlled on BP. Inpatient, she has been hypotensive. No episodic tachycardia, headaches, sweating. Denies weight changes prior to hospitalization. Denies easy bruising, bleeding normally although has had bruising with heparin shots inpatient.    Recommendations:  - Test for excess adrenal hormones:  = follow up plasma metanephrines  = serum aldosterone is elevated to 37.4. Follow up plasma renin activity.  = follow up results of overnight dexamethasone suppression test (of note, patient received IV 1mg dexamethasone): cortisol non-suppressed to 9.4 (likely not sufficient dexamethasone levels); follow up dexamethasone level  - DHEAS 139 wnl. Follow up androstenedione   - Nonurgent CT non-contrast adrenal protocol to determine hounsfield units of adrenal nodule. (Of note, CT features suggesting malignancy include HU >10, mass size >4cm, and >50% contrast retention seen 10 minutes after contrast administration (contrast retention is of low specificity/sensitivity).)  - Will need follow up outpatient with Dr. Luis Dumont      #T1DM  Has hx of T1DM since age 26  Endocrinologist: Dr. Luis Dumont  Home regimen: Lantus 33 units qhs, Novolog based on sliding scale TIDAC normally 3-5 units  Has Dexcom G7  A1c is 7.4%  Uptrending BG to 300 today possibly due to starting CLD and likely also effect of dexamethasone  - Inpatient BG goal 100-180  - increase to Lantus 18 units QHS  - increase to premeal admelog 4 units TIDAC; HOLD if NPO or not eating  - Low dose admelog correction scale TIDAC  - Low dose admelog correction scale QHS  - on CC diet with evening snack  - Of note, patient instructed on discharge from recent hospitalization at [facility] to start farxiga for CHF. Given risks of DKA of SGLT2i in T1DM, would not start until better data is available for its benefits.  - Discharge regimen: home basal/bolus insulin, dose TBD.  - Follow up with Dr. Luis Dumont outpatient      #Hx of Hypothyroidism  TSH slightly elevated to 4.79-5.06 with normal FT4 1.3-1.5  - continue home LT4 75mcg daily  - repeat TFT's outpatient when clinically stabilized          Calvin Astudillo MD  Endocrine Fellow  Can be reached via teams. For follow up questions, discharge recommendations, or new consults, please call answering service at [phone] (weekdays); [phone] (nights/weekends). 73F w HFrEF (EF 30%), mod AS, known LBBB, HTN, T1DM, CKD, hypothyroidism, chronic lymphedema, suspected OHS/LELIA on 3L NC home O2 p/w 1 episode of syncope with R arm jerking, likely 2/2 severe symptomatic AS. CTA imagings performed for TAVR eval, c/b DUNCAN on CKD. C/b febrile and hypotension, sepsis workup pending. CT c/a/p w/w/o IV contrast revealed incidental finding of L adrenal nodule.    #L adrenal nodule   Incidentally found on CT c/a/p w/wo IV contrast done for TAVR evaluation. The left adrenal gland is thickened and a 1.2 x 0.8 cm left adrenal nodule is seen. The right adrenal gland is normal.  Primary team discussed with radiology. HU of the adrenal nodule not able to be accurately determined due to motion artifact, also given imaging was taken at venous phase.     Reports no prior known hx of adrenal nodule.  Has HTN, normally well controlled on BP. Inpatient, she has been hypotensive. No episodic tachycardia, headaches, sweating. Denies weight changes prior to hospitalization. Denies easy bruising, bleeding normally although has had bruising with heparin shots inpatient.    Recommendations:  - Test for excess adrenal hormones:  = follow up plasma metanephrines  = serum aldosterone is elevated to 37.4. Follow up plasma renin activity.  = Recommend re-doing dex suppression test tonight:  Please administer oral dexamethasone 1mg at midnight, then check a serum AM cortisol (NOT free or ESO cortisol), ACTH, and dexamethasone level at 8am the following morning. Follow up results of initial overnight dexamethasone suppression test (of note, patient received IV 1mg dexamethasone): cortisol non-suppressed to 9.4 (likely not sufficient dexamethasone levels); follow up dexamethasone level.  = DHEAS 139 wnl. Follow up androstenedione   - Nonurgent CT non-contrast adrenal protocol to determine hounsfield units of adrenal nodule. (Of note, CT features suggesting malignancy include HU >10, mass size >4cm, and >50% contrast retention seen 10 minutes after contrast administration (contrast retention is of low specificity/sensitivity).)  - Will need follow up outpatient with Dr. Luis Dumont      #T1DM  Has hx of T1DM since age 26  Endocrinologist: Dr. Luis Dumont  Home regimen: Lantus 33 units qhs, Novolog based on sliding scale TIDAC normally 3-5 units  Has Dexcom G7  A1c is 7.4%  Uptrending BG to 300 today possibly due to starting CLD and likely also effect of dexamethasone  - Inpatient BG goal 100-180  - increase to Lantus 18 units QHS  - increase to premeal admelog 4 units TIDAC; HOLD if NPO or not eating  - Low dose admelog correction scale TIDAC  - Low dose admelog correction scale QHS  - on CC diet with evening snack  - Of note, patient instructed on discharge from recent hospitalization at [facility] to start farxiga for CHF. Given risks of DKA of SGLT2i in T1DM, would not start until better data is available for its benefits.  - Discharge regimen: home basal/bolus insulin, dose TBD.  - Follow up with Dr. Luis Dumont outpatient      #Hx of Hypothyroidism  TSH slightly elevated to 4.79-5.06 with normal FT4 1.3-1.5  - continue home LT4 75mcg daily  - repeat TFT's outpatient when clinically stabilized          Calvin Astudillo MD  Endocrine Fellow  Can be reached via teams. For follow up questions, discharge recommendations, or new consults, please call answering service at [phone] (weekdays); [phone] (nights/weekends).

## 2024-04-19 NOTE — PROGRESS NOTE ADULT - SUBJECTIVE AND OBJECTIVE BOX
[facility]   [phone]  RENAL FOLLOW UP NOTE  --------------------------------------------------------------------------------  Chief Complaint:    24 hour events/subjective:    patient seen and examined.   carmen sob  reports no issues with HD yesterday    PAST HISTORY  --------------------------------------------------------------------------------  No significant changes to PMH, PSH, FHx, SHx, unless otherwise noted    ALLERGIES & MEDICATIONS  --------------------------------------------------------------------------------  Allergies    contrast media (iodine-based) (Fever; Vomiting; Flushing; Hypotension; Rash; Stomach Upset)  Ativan (Rash; Urticaria)  penicillins (Hives (Mod to Severe); Anaphylaxis (Mod to Severe); Short breath (Mod to Severe); Angioedema (Mod to Severe))    Intolerances      Standing Inpatient Medications  ascorbic acid 500 milliGRAM(s) Oral daily  aspirin  chewable 81 milliGRAM(s) Oral daily  atorvastatin 80 milliGRAM(s) Oral at bedtime  calamine/zinc oxide Lotion 1 Application(s) Topical three times a day  cetirizine 10 milliGRAM(s) Oral daily  chlorhexidine 4% Liquid 1 Application(s) Topical <User Schedule>  dextrose 10% Bolus 125 milliLiter(s) IV Bolus once  dextrose 5%. 1000 milliLiter(s) IV Continuous <Continuous>  dextrose 5%. 1000 milliLiter(s) IV Continuous <Continuous>  dextrose 50% Injectable 12.5 Gram(s) IV Push once  dextrose 50% Injectable 25 Gram(s) IV Push once  diphenhydrAMINE Injectable 50 milliGRAM(s) IntraMuscular every 6 hours  ferrous    sulfate 325 milliGRAM(s) Oral two times a day  glucagon  Injectable 1 milliGRAM(s) IntraMuscular once  heparin   Injectable 5000 Unit(s) SubCutaneous every 8 hours  insulin glargine Injectable (LANTUS) 12 Unit(s) SubCutaneous at bedtime  insulin lispro (ADMELOG) corrective regimen sliding scale   SubCutaneous at bedtime  insulin lispro (ADMELOG) corrective regimen sliding scale   SubCutaneous three times a day before meals  insulin lispro Injectable (ADMELOG) 2 Unit(s) SubCutaneous three times a day before meals  levothyroxine 75 MICROGram(s) Oral daily  multivitamin 1 Tablet(s) Oral daily  mupirocin 2% Nasal 1 Application(s) Both Nostrils two times a day  nystatin    Suspension 851412 Unit(s) Oral four times a day  nystatin/triamcinolone Cream 1 Application(s) Topical every 12 hours  petrolatum white Ointment 1 Application(s) Topical three times a day  senna 2 Tablet(s) Oral at bedtime  triamcinolone 0.1% Ointment 1 Application(s) Topical two times a day    PRN Inpatient Medications  dextrose Oral Gel 15 Gram(s) Oral once PRN  metoclopramide Injectable 10 milliGRAM(s) IV Push every 6 hours PRN  polyethylene glycol 3350 17 Gram(s) Oral two times a day PRN  sodium chloride 0.9% lock flush 10 milliLiter(s) IV Push every 1 hour PRN      REVIEW OF SYSTEMS  --------------------------------------------------------------------------------    Gen: denies fevers/chills,  CVS: denies chest pain/palpitations  Resp: denies SOB/Cough  GI: Denies N/V/Abd pain  : Denies dysuria, +oliguria    VITALS/PHYSICAL EXAM  --------------------------------------------------------------------------------  T(C): 36.8 (04-19-24 @ 08:23), Max: 37.1 (04-18-24 @ 11:27)  HR: 100 (04-19-24 @ 08:23) (78 - 101)  BP: 94/57 (04-19-24 @ 08:23) (92/58 - 125/69)  RR: 18 (04-19-24 @ 08:23) (16 - 19)  SpO2: 96% (04-19-24 @ 08:23) (95% - 100%)  Wt(kg): --        04-18-24 @ 07:01  -  04-19-24 @ 07:00  --------------------------------------------------------  IN: 150 mL / OUT: 701 mL / NET: -551 mL    04-19-24 @ 07:01  -  04-19-24 @ 10:39  --------------------------------------------------------  IN: 240 mL / OUT: 0 mL / NET: 240 mL      Physical Exam:  	  	Gen: ill appearing, on O2, facial erythema, neck and arm erythema,   	Pulm: decrease bs  no rales or ronchi or wheezing  	CV: No JVD. RRR, S1S2; no rub II/VI M   	Abd: +BS, soft, nontender/nondistended, obese   	: No suprapubic tenderness  	UE: Warm, no cyanosis  no clubbing,  no edema  	LE: Warm, no cyanosis  no clubbing, 4+ edema, B/L LE raised red plaque   	Neuro: alert and oriented. speech coherent               Vascular Access: RIJ non tunneled HD catheter    LABS/STUDIES  --------------------------------------------------------------------------------              10.3   8.82  >-----------<  119      [04-19-24 @ 06:45]              33.1     131  |  89  |  79  ----------------------------<  298      [04-19-24 @ 06:45]  5.1   |  26  |  4.35        Ca     8.8     [04-19-24 @ 06:45]      Mg     2.4     [04-19-24 @ 06:45]      Phos  3.8     [04-19-24 @ 06:45]    TPro  6.2  /  Alb  2.9  /  TBili  1.1  /  DBili  x   /  AST  12  /  ALT  7   /  AlkPhos  56  [04-19-24 @ 06:45]          Creatinine Trend:  SCr 4.35 [04-19 @ 06:45]  SCr 4.55 [04-18 @ 16:42]  SCr 4.25 [04-18 @ 09:24]  SCr 3.62 [04-17 @ 11:00]  SCr 2.81 [04-16 @ 06:47]          Urine Creatinine 34      [04-12-24 @ 16:51]  Urine Protein 8      [04-12-24 @ 16:51]  Urine Sodium 27      [04-12-24 @ 16:51]  Urine Urea Nitrogen 243      [04-12-24 @ 18:07]  Urine Potassium 29      [04-12-24 @ 16:51]  Urine Osmolality 207      [04-12-24 @ 16:51]    TSH 5.06      [04-14-24 @ 07:18]  Lipid: chol 74, TG 70, HDL 33, LDL --      [04-13-24 @ 07:05]

## 2024-04-19 NOTE — PROGRESS NOTE ADULT - SUBJECTIVE AND OBJECTIVE BOX
[facility] -- Shyanne Mueller, Goran, Howard, Carlos Luong Savella  Office # [phone]      Follow Up:  syncope    Subjective/Observations: Patient seen and examined. Events noted. Resting comfortably in bed. No complaints of chest pain, dyspnea, or palpitations reported. No signs of orthopnea or PND.       REVIEW OF SYSTEMS: All other review of systems is negative unless indicated above    PAST MEDICAL & SURGICAL HISTORY:  Hypertension      Diabetes      Lymphedema      H/O left bundle branch block      History of left bundle branch block (LBBB)      Systolic heart failure, chronic      Type 1 diabetes      H/O cataract  2020      History of surgical removal of meniscus of knee  left in 1971      Frozen shoulder  2000      H/O Achilles tendon repair  lengthened bilaterally, 2000      Fractured skull  1968          MEDICATIONS  (STANDING):  ascorbic acid 500 milliGRAM(s) Oral daily  aspirin  chewable 81 milliGRAM(s) Oral daily  atorvastatin 80 milliGRAM(s) Oral at bedtime  calamine/zinc oxide Lotion 1 Application(s) Topical three times a day  cetirizine 10 milliGRAM(s) Oral daily  chlorhexidine 4% Liquid 1 Application(s) Topical <User Schedule>  dextrose 10% Bolus 125 milliLiter(s) IV Bolus once  dextrose 5%. 1000 milliLiter(s) (100 mL/Hr) IV Continuous <Continuous>  dextrose 5%. 1000 milliLiter(s) (50 mL/Hr) IV Continuous <Continuous>  dextrose 50% Injectable 12.5 Gram(s) IV Push once  dextrose 50% Injectable 25 Gram(s) IV Push once  diphenhydrAMINE Injectable 50 milliGRAM(s) IntraMuscular every 6 hours  ferrous    sulfate 325 milliGRAM(s) Oral two times a day  glucagon  Injectable 1 milliGRAM(s) IntraMuscular once  heparin   Injectable 5000 Unit(s) SubCutaneous every 8 hours  insulin glargine Injectable (LANTUS) 12 Unit(s) SubCutaneous at bedtime  insulin lispro (ADMELOG) corrective regimen sliding scale   SubCutaneous at bedtime  insulin lispro (ADMELOG) corrective regimen sliding scale   SubCutaneous three times a day before meals  insulin lispro Injectable (ADMELOG) 2 Unit(s) SubCutaneous three times a day before meals  lactated ringers. 500 milliLiter(s) (100 mL/Hr) IV Continuous <Continuous>  levothyroxine 75 MICROGram(s) Oral daily  multivitamin 1 Tablet(s) Oral daily  mupirocin 2% Nasal 1 Application(s) Both Nostrils two times a day  nystatin    Suspension 279683 Unit(s) Oral four times a day  nystatin/triamcinolone Cream 1 Application(s) Topical every 12 hours  petrolatum white Ointment 1 Application(s) Topical three times a day  polyethylene glycol 3350 17 Gram(s) Oral two times a day  senna 2 Tablet(s) Oral at bedtime  triamcinolone 0.1% Ointment 1 Application(s) Topical two times a day    MEDICATIONS  (PRN):  dextrose Oral Gel 15 Gram(s) Oral once PRN Blood Glucose LESS THAN 70 milliGRAM(s)/deciliter  metoclopramide Injectable 10 milliGRAM(s) IV Push every 6 hours PRN nausea vomiting  sodium chloride 0.9% lock flush 10 milliLiter(s) IV Push every 1 hour PRN Pre/post blood products, medications, blood draw, and to maintain line patency      Allergies    contrast media (iodine-based) (Fever; Vomiting; Flushing; Hypotension; Rash; Stomach Upset)  Ativan (Rash; Urticaria)  penicillins (Hives (Mod to Severe); Anaphylaxis (Mod to Severe); Short breath (Mod to Severe); Angioedema (Mod to Severe))    Intolerances            Vital Signs Last 24 Hrs  T(C): 36.7 (19 Apr 2024 04:15), Max: 37.1 (18 Apr 2024 11:27)  T(F): 98.1 (19 Apr 2024 04:15), Max: 98.8 (18 Apr 2024 11:27)  HR: 101 (19 Apr 2024 04:15) (78 - 101)  BP: 96/59 (19 Apr 2024 04:15) (92/58 - 125/69)  BP(mean): --  RR: 19 (19 Apr 2024 04:15) (16 - 19)  SpO2: 98% (19 Apr 2024 04:15) (95% - 100%)    Parameters below as of 19 Apr 2024 04:15  Patient On (Oxygen Delivery Method): nasal cannula  O2 Flow (L/min): 3      I&O's Summary    18 Apr 2024 07:01  -  19 Apr 2024 07:00  --------------------------------------------------------  IN: 150 mL / OUT: 701 mL / NET: -551 mL          PHYSICAL EXAM:  TELE: SR   Constitutional: NAD, awake    HEENT: Moist Mucous Membranes, Anicteric  Pulmonary: Decreased breath sounds b/l. No rales, crackles or wheeze appreciated.   Cardiovascular: Regular, S1 and S2, No murmurs, rubs, gallops or clicks  Gastrointestinal: Bowel Sounds present, soft, nontender.   Lymph: +++ peripheral edema. No lymphadenopathy.  Skin: No visible rashes or ulcers.  Psych:  Mood & affect appropriate for situation    LABS: All Labs Reviewed:                        9.9 9.30  )-----------( 129      ( 18 Apr 2024 09:21 )             33.1                         11.5   12.90 )-----------( 159      ( 17 Apr 2024 11:00 )             37.0                         12.6   14.94 )-----------( 220      ( 16 Apr 2024 09:50 )             42.6     18 Apr 2024 16:42    130    |  88     |  94     ----------------------------<  239    5.0     |  27     |  4.55   18 Apr 2024 09:24    128    |  89     |  88     ----------------------------<  228    5.5     |  26     |  4.25   17 Apr 2024 11:00    133    |  91     |  77     ----------------------------<  121    5.0     |  30     |  3.62     Ca    8.9        18 Apr 2024 16:42  Ca    8.6        18 Apr 2024 09:24  Ca    8.6        17 Apr 2024 11:00  Phos  4.2       18 Apr 2024 16:42  Phos  4.2       18 Apr 2024 09:24  Phos  3.6       17 Apr 2024 11:00  Mg     2.3       18 Apr 2024 16:42  Mg     2.2       18 Apr 2024 09:24  Mg     2.1       17 Apr 2024 11:00    TPro  6.0    /  Alb  2.8    /  TBili  1.1    /  DBili  x      /  AST  9      /  ALT  9      /  AlkPhos  50     18 Apr 2024 09:24  TPro  6.3    /  Alb  2.9    /  TBili  1.1    /  DBili  x      /  AST  14     /  ALT  7      /  AlkPhos  47     17 Apr 2024 11:00        - Troponin:

## 2024-04-19 NOTE — PROGRESS NOTE ADULT - PROBLEM SELECTOR PLAN 9
home lantus 33u qhs    - endocrine consulted; recs appreciated  - Decreased Lantus to 12u QHS iso poor PO intake and hypoglycemia    - Added Admelog 2u TID   - QUE, FSG ACHS or q6h if NPO; goal glucose   - CTM Dexcom for glucose monitoring

## 2024-04-19 NOTE — PROGRESS NOTE ADULT - SUBJECTIVE AND OBJECTIVE BOX
Admitted for: Syncope and collapse        Following for: Adrenal nodule    Subjective:   - Feels improved, less itchiness and redness  - Eating regular solid food now      MEDICATIONS  (STANDING):  ascorbic acid 500 milliGRAM(s) Oral daily  aspirin  chewable 81 milliGRAM(s) Oral daily  atorvastatin 80 milliGRAM(s) Oral at bedtime  calamine/zinc oxide Lotion 1 Application(s) Topical three times a day  cetirizine 10 milliGRAM(s) Oral daily  chlorhexidine 4% Liquid 1 Application(s) Topical <User Schedule>  dextrose 10% Bolus 125 milliLiter(s) IV Bolus once  dextrose 5%. 1000 milliLiter(s) (100 mL/Hr) IV Continuous <Continuous>  dextrose 5%. 1000 milliLiter(s) (50 mL/Hr) IV Continuous <Continuous>  dextrose 50% Injectable 12.5 Gram(s) IV Push once  dextrose 50% Injectable 25 Gram(s) IV Push once  diphenhydrAMINE Injectable 50 milliGRAM(s) IntraMuscular every 6 hours  ferrous    sulfate 325 milliGRAM(s) Oral two times a day  glucagon  Injectable 1 milliGRAM(s) IntraMuscular once  heparin   Injectable 5000 Unit(s) SubCutaneous every 8 hours  insulin glargine Injectable (LANTUS) 15 Unit(s) SubCutaneous at bedtime  insulin lispro (ADMELOG) corrective regimen sliding scale   SubCutaneous at bedtime  insulin lispro (ADMELOG) corrective regimen sliding scale   SubCutaneous three times a day before meals  insulin lispro Injectable (ADMELOG) 2 Unit(s) SubCutaneous three times a day before meals  levothyroxine 75 MICROGram(s) Oral daily  multivitamin 1 Tablet(s) Oral daily  mupirocin 2% Nasal 1 Application(s) Both Nostrils two times a day  nystatin    Suspension 324525 Unit(s) Oral four times a day  nystatin/triamcinolone Cream 1 Application(s) Topical every 12 hours  petrolatum white Ointment 1 Application(s) Topical three times a day  senna 2 Tablet(s) Oral at bedtime  triamcinolone 0.1% Ointment 1 Application(s) Topical two times a day    MEDICATIONS  (PRN):  dextrose Oral Gel 15 Gram(s) Oral once PRN Blood Glucose LESS THAN 70 milliGRAM(s)/deciliter  metoclopramide Injectable 10 milliGRAM(s) IV Push every 6 hours PRN nausea vomiting  polyethylene glycol 3350 17 Gram(s) Oral two times a day PRN Constipation  sodium chloride 0.9% lock flush 10 milliLiter(s) IV Push every 1 hour PRN Pre/post blood products, medications, blood draw, and to maintain line patency      Allergies    contrast media (iodine-based) (Fever; Vomiting; Flushing; Hypotension; Rash; Stomach Upset)  Ativan (Rash; Urticaria)  penicillins (Hives (Mod to Severe); Anaphylaxis (Mod to Severe); Short breath (Mod to Severe); Angioedema (Mod to Severe))    Intolerances          PHYSICAL EXAM:  VITALS: T(C): 36.8 (04-19-24 @ 12:17)  T(F): 98.3 (04-19-24 @ 12:17), Max: 98.3 (04-19-24 @ 12:17)  HR: 109 (04-19-24 @ 12:17) (78 - 109)  BP: 97/60 (04-19-24 @ 12:17) (92/58 - 111/56)  RR:  (16 - 19)  SpO2:  (95% - 100%)  Wt(kg): --  GENERAL: NAD, obese  EYES: No proptosis, no lid lag, anicteric  RESPIRATORY: no respiratory distress  CARDIOVASCULAR: +peripheral edema  GI: obese abdomen  SKIN: erythema diffusely  EXT: PEREZ  PSYCH: Alert and oriented, reactive affect        A1C with Estimated Average Glucose Result: 7.4 % (03-30-24 @ 08:21)  A1C with Estimated Average Glucose Result: 6.8 % (01-10-24 @ 08:37)  A1C with Estimated Average Glucose Result: 7.3 % (08-12-23 @ 07:58)  A1C with Estimated Average Glucose Result: 8.2 % (06-05-23 @ 06:10)      POCT Blood Glucose.: 232 mg/dL (04-19-24 @ 12:34)  POCT Blood Glucose.: 321 mg/dL (04-19-24 @ 09:02)  POCT Blood Glucose.: 220 mg/dL (04-18-24 @ 21:44)  POCT Blood Glucose.: 243 mg/dL (04-18-24 @ 17:29)  POCT Blood Glucose.: 258 mg/dL (04-18-24 @ 13:24)  POCT Blood Glucose.: 300 mg/dL (04-18-24 @ 11:14)  POCT Blood Glucose.: 226 mg/dL (04-18-24 @ 10:51)  POCT Blood Glucose.: 236 mg/dL (04-18-24 @ 08:54)  POCT Blood Glucose.: 211 mg/dL (04-17-24 @ 21:21)  POCT Blood Glucose.: 152 mg/dL (04-17-24 @ 17:18)  POCT Blood Glucose.: 123 mg/dL (04-17-24 @ 13:06)  POCT Blood Glucose.: 136 mg/dL (04-17-24 @ 09:07)  POCT Blood Glucose.: 139 mg/dL (04-16-24 @ 22:29)  POCT Blood Glucose.: 126 mg/dL (04-16-24 @ 21:25)  POCT Blood Glucose.: 89 mg/dL (04-16-24 @ 19:18)  POCT Blood Glucose.: 91 mg/dL (04-16-24 @ 18:56)  POCT Blood Glucose.: 69 mg/dL (04-16-24 @ 18:19)  POCT Blood Glucose.: 64 mg/dL (04-16-24 @ 17:31)  POCT Blood Glucose.: 77 mg/dL (04-16-24 @ 12:49)      04-19    131<L>  |  89<L>  |  79<H>  ----------------------------<  298<H>  5.1   |  26  |  4.35<H>    eGFR: 10<L>    Ca    8.8      04-19  Mg     2.4     04-19  Phos  3.8     04-19    TPro  6.2  /  Alb  2.9<L>  /  TBili  1.1  /  DBili  x   /  AST  12  /  ALT  7<L>  /  AlkPhos  56  04-19      Thyroid Function Tests:  04-14 @ 07:18 TSH 5.06 FreeT4 1.3 T3 -- Anti TPO -- Anti Thyroglobulin Ab -- TSI --  04-13 @ 07:05 TSH 4.79 FreeT4 1.5 T3 -- Anti TPO -- Anti Thyroglobulin Ab -- TSI --

## 2024-04-19 NOTE — PROGRESS NOTE ADULT - SUBJECTIVE AND OBJECTIVE BOX
24H hour events: No acute events overnight    MEDICATIONS:  aspirin  chewable 81 milliGRAM(s) Oral daily  heparin   Injectable 5000 Unit(s) SubCutaneous every 8 hours  nystatin    Suspension 971356 Unit(s) Oral four times a day  cetirizine 10 milliGRAM(s) Oral daily  diphenhydrAMINE Injectable 50 milliGRAM(s) IntraMuscular every 6 hours  metoclopramide Injectable 10 milliGRAM(s) IV Push every 6 hours PRN  polyethylene glycol 3350 17 Gram(s) Oral two times a day  senna 2 Tablet(s) Oral at bedtime  atorvastatin 80 milliGRAM(s) Oral at bedtime  dextrose 50% Injectable 12.5 Gram(s) IV Push once  dextrose 50% Injectable 25 Gram(s) IV Push once  dextrose Oral Gel 15 Gram(s) Oral once PRN  glucagon  Injectable 1 milliGRAM(s) IntraMuscular once  insulin glargine Injectable (LANTUS) 12 Unit(s) SubCutaneous at bedtime  insulin lispro (ADMELOG) corrective regimen sliding scale   SubCutaneous at bedtime  insulin lispro (ADMELOG) corrective regimen sliding scale   SubCutaneous three times a day before meals  insulin lispro Injectable (ADMELOG) 2 Unit(s) SubCutaneous three times a day before meals  levothyroxine 75 MICROGram(s) Oral daily  ascorbic acid 500 milliGRAM(s) Oral daily  calamine/zinc oxide Lotion 1 Application(s) Topical three times a day  chlorhexidine 4% Liquid 1 Application(s) Topical <User Schedule>  dextrose 10% Bolus 125 milliLiter(s) IV Bolus once  dextrose 5%. 1000 milliLiter(s) IV Continuous <Continuous>  dextrose 5%. 1000 milliLiter(s) IV Continuous <Continuous>  ferrous    sulfate 325 milliGRAM(s) Oral two times a day  multivitamin 1 Tablet(s) Oral daily  mupirocin 2% Nasal 1 Application(s) Both Nostrils two times a day  nystatin/triamcinolone Cream 1 Application(s) Topical every 12 hours  petrolatum white Ointment 1 Application(s) Topical three times a day  sodium chloride 0.9% lock flush 10 milliLiter(s) IV Push every 1 hour PRN  triamcinolone 0.1% Ointment 1 Application(s) Topical two times a day    REVIEW OF SYSTEMS:  See HPI, otherwise ROS negative.    PHYSICAL EXAM:  T(C): 36.8 (04-19-24 @ 08:23), Max: 37.1 (04-18-24 @ 11:27)  HR: 100 (04-19-24 @ 08:23) (78 - 101)  BP: 94/57 (04-19-24 @ 08:23) (92/58 - 125/69)  RR: 18 (04-19-24 @ 08:23) (16 - 19)  SpO2: 96% (04-19-24 @ 08:23) (95% - 100%)  Wt(kg): --  I&O's Summary    18 Apr 2024 07:01  -  19 Apr 2024 07:00  --------------------------------------------------------  IN: 150 mL / OUT: 701 mL / NET: -551 mL    Appearance: Alert. NAD	  HEENT:   NC/AT	  Cardiovascular: +S1S2  Respiratory: CTA B/L	  Psychiatry: A & O x 3, Mood & affect appropriate  Gastrointestinal:  Soft, NT.ND.  Neurologic: Non-focal  Extremities: Bilateral lower extremity edema noted  Vascular: Peripheral pulses palpable 2+ bilaterally  Non tunneled HD catheter noted to right chest wall    LABS:	 	  CBC Full  -  ( 19 Apr 2024 06:45 )  WBC Count : 8.82 K/uL  Hemoglobin : 10.3 g/dL  Hematocrit : 33.1 %  Platelet Count - Automated : 119 K/uL  Mean Cell Volume : 89.7 fl  Mean Cell Hemoglobin : 27.9 pg  Mean Cell Hemoglobin Concentration : 31.1 gm/dL  Auto Neutrophil # : 7.35 K/uL  Auto Lymphocyte # : 0.24 K/uL  Auto Monocyte # : 0.31 K/uL  Auto Eosinophil # : 0.93 K/uL  Auto Basophil # : 0.00 K/uL  Auto Neutrophil % : 79.8 %  Auto Lymphocyte % : 2.7 %  Auto Monocyte % : 3.5 %  Auto Eosinophil % : 10.5 %  Auto Basophil % : 0.0 %    04-19    131<L>  |  89<L>  |  79<H>  ----------------------------<  298<H>  5.1   |  26  |  4.35<H>  04-18    130<L>  |  88<L>  |  94<H>  ----------------------------<  239<H>  5.0   |  27  |  4.55<H>    Ca    8.8      19 Apr 2024 06:45  Ca    8.9      18 Apr 2024 16:42  Phos  3.8     04-19  Phos  4.2     04-18  Mg     2.4     04-19  Mg     2.3     04-18    TPro  6.2  /  Alb  2.9<L>  /  TBili  1.1  /  DBili  x   /  AST  12  /  ALT  7<L>  /  AlkPhos  56  04-19  TPro  6.0  /  Alb  2.8<L>  /  TBili  1.1  /  DBili  x   /  AST  9<L>  /  ALT  9<L>  /  AlkPhos  50  04-18    TELEMETRY: Sinus rhythm 90-100s  	    ECHO:  TRANSTHORACIC ECHOCARDIOGRAM REPORT  ________________________________________________________________________________                                      _______       Pt. Name:       EVELYN LOPEZ Study Date:    4/16/2024  MRN:            [id number]        YOB: 1950  Accession #:    [id number]        Age:           73 years  Account#:       [id number]     Gender:        F  Heart Rate:     84 bpm           Height:        70.00 in (177.80 cm)  Rhythm:                          Weight:        330.00 lb (149.69 kg)  Blood Pressure: 135/69 mmHg      BSA/BMI:       2.58 m² / 47.35 kg/m²  ________________________________________________________________________________________  Referring Physician:    [id number] Anitra Morgan  Interpreting Physician: Gibran Canela MD  Primary Sonographer:    Getachew Watts RDCS    CPT:               ECHO TTE W/O CON F/U [facility] - 63888.m;LIMITED SPECTRAL - 39773.m  Indication(s):     Cardiac murmur, unspecified - R01.1  Procedure:         Limited transthoracic echocardiogram.  Ordering Location: [facility]  Admission Status:  Inpatient  Study Information: Image quality for this study is fair.    _______________________________________________________________________________________     CONCLUSIONS:      1. Limited study to evaluate the aortic valve.   2. Severe aortic stenosis.    ________________________________________________________________________________________  FINDINGS:     Aortic Valve:  There is severe aortic stenosis. The peak transaortic velocity is 4.29 m/s, peak transaortic gradient is 73.6 mmHg and mean transaortic gradient is 43.0 mmHg with an LVOT/aortic valve VTI ratio of 0.21. The aortic valve area is estimated at 0.93 cm² by the continuity equation. There is trace aortic regurgitation.  ____________________________________________________________________  QUANTITATIVE DATA:     LVOT / RVOT/ Qp/Qs Data: (Indexed to BSA)  LVOT Diameter:  2.40 cm  LVOT Area:      4.52 cm²  LVOT Vmax:      0.90 m/s  LVOT Vmn:       0.635 m/s  LVOT VTI:       21.20 cm  LVOT peak grad: 3 mmHg  LVOT mean grad: 11.7 mmHg  LVOT SV:        95.9 ml   37.14 ml/m²    Aortic Valve Measurements:  AV Vmax:                4.3 m/s  AV Peak Gradient:       73.6 mmHg  AV Mean Gradient:       43.0 mmHg  AV VTI:                 103.0 cm  AV VTI Ratio:           0.21  AoV EOA, Contin:        0.93 cm²  AoV EOA, Contin i:      0.36 cm²/m²  AoV Dimensionless Index 0.21    ________________________________________________________________________________________  Electronically signed on 4/16/2024 at 6:27:46 PM by Gibran Canela MD   no known allergies

## 2024-04-19 NOTE — PROGRESS NOTE ADULT - PROBLEM SELECTOR PLAN 1
RRT 4/16 for sepsis rectal temp 102, hypotension to systolic 70s, no leukocytosis, with new onset vomiting and rash  DDx inc: Delayed contrast reaction vs infection  -F/u MRSA swab+   -BCx 4/16 NGTD,     Plan:  - F/u Ucx, repeat CXR   - hold off abx  - will hold off on Vanc for possible red man syndrome but unclear onset of rash timing   - F/u derm consult - likely drug related rash vs infectious -- punch biopsy taken 4/17  - ID consulted, suspect allergic rxn rather than infectious, no contraindications to steroids RRT 4/16 for sepsis rectal temp 102, hypotension to systolic 70s, no leukocytosis, with new onset vomiting and rash  DDx inc: Delayed contrast reaction vs infection  -F/u MRSA swab+, CXR neg  -BCx 4/16 NGTD, UCx 10-40k E.coli    Plan:  - monitor off abx  - will hold off on Vanc for possible red man syndrome but unclear onset of rash timing   - F/u derm consult - likely drug related rash vs infectious -- punch biopsy taken 4/17  - ID consulted, suspect allergic rxn rather than infectious, no contraindications to steroids

## 2024-04-20 NOTE — PROGRESS NOTE ADULT - PROBLEM SELECTOR PLAN 5
CTA CAP incidentally noted for L adrenal thickened w a 1.2 x 0.8 cm left adrenal nodule is seen.  - 1mg IV dexamethasone administered 4/17 10PM, f/u 8AM serum cortisol wnl    Plan:    - endo consulted; recs appreciated  - repeat AM cortisol after dex challenge, f/u results

## 2024-04-20 NOTE — PROGRESS NOTE ADULT - SUBJECTIVE AND OBJECTIVE BOX
PROGRESS NOTE:   Authored by Dr. Mireya Hayden MD (PGY-1). Pager [facility] [phone]/ [facility]     Patient is a 73y old  Female who presents with a chief complaint of Syncope (19 Apr 2024 13:48)      SUBJECTIVE / OVERNIGHT EVENTS:  No acute events overnight.     All other ROS neg except as above in HPI    MEDICATIONS  (STANDING):  ascorbic acid 500 milliGRAM(s) Oral daily  aspirin  chewable 81 milliGRAM(s) Oral daily  atorvastatin 80 milliGRAM(s) Oral at bedtime  calamine/zinc oxide Lotion 1 Application(s) Topical three times a day  cetirizine 10 milliGRAM(s) Oral daily  chlorhexidine 4% Liquid 1 Application(s) Topical <User Schedule>  dextrose 10% Bolus 125 milliLiter(s) IV Bolus once  dextrose 5%. 1000 milliLiter(s) (100 mL/Hr) IV Continuous <Continuous>  dextrose 5%. 1000 milliLiter(s) (50 mL/Hr) IV Continuous <Continuous>  dextrose 50% Injectable 12.5 Gram(s) IV Push once  dextrose 50% Injectable 25 Gram(s) IV Push once  diphenhydrAMINE Injectable 50 milliGRAM(s) IV Push every 6 hours  ferrous    sulfate 325 milliGRAM(s) Oral two times a day  glucagon  Injectable 1 milliGRAM(s) IntraMuscular once  heparin   Injectable 5000 Unit(s) SubCutaneous every 8 hours  insulin glargine Injectable (LANTUS) 18 Unit(s) SubCutaneous at bedtime  insulin lispro (ADMELOG) corrective regimen sliding scale   SubCutaneous three times a day before meals  insulin lispro (ADMELOG) corrective regimen sliding scale   SubCutaneous at bedtime  insulin lispro Injectable (ADMELOG) 4 Unit(s) SubCutaneous three times a day before meals  levothyroxine 75 MICROGram(s) Oral daily  multivitamin 1 Tablet(s) Oral daily  mupirocin 2% Nasal 1 Application(s) Both Nostrils two times a day  nystatin    Suspension 035761 Unit(s) Oral four times a day  nystatin/triamcinolone Cream 1 Application(s) Topical every 12 hours  petrolatum white Ointment 1 Application(s) Topical three times a day  senna 2 Tablet(s) Oral at bedtime  triamcinolone 0.1% Ointment 1 Application(s) Topical two times a day    MEDICATIONS  (PRN):  dextrose Oral Gel 15 Gram(s) Oral once PRN Blood Glucose LESS THAN 70 milliGRAM(s)/deciliter  metoclopramide Injectable 10 milliGRAM(s) IV Push every 6 hours PRN nausea vomiting  polyethylene glycol 3350 17 Gram(s) Oral two times a day PRN Constipation  sodium chloride 0.9% lock flush 10 milliLiter(s) IV Push every 1 hour PRN Pre/post blood products, medications, blood draw, and to maintain line patency      CAPILLARY BLOOD GLUCOSE      POCT Blood Glucose.: 168 mg/dL (19 Apr 2024 21:25)  POCT Blood Glucose.: 201 mg/dL (19 Apr 2024 18:38)  POCT Blood Glucose.: 232 mg/dL (19 Apr 2024 12:34)  POCT Blood Glucose.: 321 mg/dL (19 Apr 2024 09:02)    I&O's Summary    19 Apr 2024 07:01  -  20 Apr 2024 07:00  --------------------------------------------------------  IN: 840 mL / OUT: 1300 mL / NET: -460 mL        PHYSICAL EXAM:  Vital Signs Last 24 Hrs  T(C): 36.8 (20 Apr 2024 03:42), Max: 37.2 (19 Apr 2024 16:33)  T(F): 98.3 (20 Apr 2024 03:42), Max: 98.9 (19 Apr 2024 16:33)  HR: 107 (20 Apr 2024 03:42) (100 - 110)  BP: 96/56 (20 Apr 2024 03:42) (91/55 - 104/51)  BP(mean): --  RR: 18 (20 Apr 2024 03:42) (18 - 18)  SpO2: 98% (20 Apr 2024 03:42) (94% - 98%)    Parameters below as of 20 Apr 2024 03:42  Patient On (Oxygen Delivery Method): nasal cannula  O2 Flow (L/min): 3      CONSTITUTIONAL: NAD, well-developed  HEET: MMM, EOMI, PERRLA  NECK: supple  RESPIRATORY: Normal respiratory effort; lungs are clear to auscultation bilaterally  CARDIOVASCULAR: Regular rate and rhythm, normal S1 and S2, no murmur/rub/gallop; No lower extremity edema; Peripheral pulses are 2+ bilaterally  ABDOMEN: Nontender to palpation, normoactive bowel sounds, no rebound/guarding; No hepatosplenomegaly  MUSCULOSKELETAL: no clubbing or cyanosis of digits; no joint swelling or tenderness to palpation  NEURO: Moving all four extremities, sensation grossly intact  PSYCH: A+O to person, place, and time; affect appropriate  SKIN: No rash    LABS:                        10.3   8.82  )-----------( 119      ( 19 Apr 2024 06:45 )             33.1     04-19    131<L>  |  89<L>  |  79<H>  ----------------------------<  298<H>  5.1   |  26  |  4.35<H>    Ca    8.8      19 Apr 2024 06:45  Phos  3.8     04-19  Mg     2.4     04-19    TPro  6.2  /  Alb  2.9<L>  /  TBili  1.1  /  DBili  x   /  AST  12  /  ALT  7<L>  /  AlkPhos  56  04-19          Urinalysis Basic - ( 19 Apr 2024 06:45 )    Color: x / Appearance: x / SG: x / pH: x  Gluc: 298 mg/dL / Ketone: x  / Bili: x / Urobili: x   Blood: x / Protein: x / Nitrite: x   Leuk Esterase: x / RBC: x / WBC x   Sq Epi: x / Non Sq Epi: x / Bacteria: x           PROGRESS NOTE:   Authored by Dr. Mireya Hayden MD (PGY-1). Pager [facility] [phone]/ [facility]     Patient is a 73y old  Female who presents with a chief complaint of Syncope (19 Apr 2024 13:48)      SUBJECTIVE / OVERNIGHT EVENTS:  No acute events overnight. Rash has improved slightly.     All other ROS neg except as above in HPI    MEDICATIONS  (STANDING):  ascorbic acid 500 milliGRAM(s) Oral daily  aspirin  chewable 81 milliGRAM(s) Oral daily  atorvastatin 80 milliGRAM(s) Oral at bedtime  calamine/zinc oxide Lotion 1 Application(s) Topical three times a day  cetirizine 10 milliGRAM(s) Oral daily  chlorhexidine 4% Liquid 1 Application(s) Topical <User Schedule>  dextrose 10% Bolus 125 milliLiter(s) IV Bolus once  dextrose 5%. 1000 milliLiter(s) (100 mL/Hr) IV Continuous <Continuous>  dextrose 5%. 1000 milliLiter(s) (50 mL/Hr) IV Continuous <Continuous>  dextrose 50% Injectable 12.5 Gram(s) IV Push once  dextrose 50% Injectable 25 Gram(s) IV Push once  diphenhydrAMINE Injectable 50 milliGRAM(s) IV Push every 6 hours  ferrous    sulfate 325 milliGRAM(s) Oral two times a day  glucagon  Injectable 1 milliGRAM(s) IntraMuscular once  heparin   Injectable 5000 Unit(s) SubCutaneous every 8 hours  insulin glargine Injectable (LANTUS) 18 Unit(s) SubCutaneous at bedtime  insulin lispro (ADMELOG) corrective regimen sliding scale   SubCutaneous three times a day before meals  insulin lispro (ADMELOG) corrective regimen sliding scale   SubCutaneous at bedtime  insulin lispro Injectable (ADMELOG) 4 Unit(s) SubCutaneous three times a day before meals  levothyroxine 75 MICROGram(s) Oral daily  multivitamin 1 Tablet(s) Oral daily  mupirocin 2% Nasal 1 Application(s) Both Nostrils two times a day  nystatin    Suspension 081066 Unit(s) Oral four times a day  nystatin/triamcinolone Cream 1 Application(s) Topical every 12 hours  petrolatum white Ointment 1 Application(s) Topical three times a day  senna 2 Tablet(s) Oral at bedtime  triamcinolone 0.1% Ointment 1 Application(s) Topical two times a day    MEDICATIONS  (PRN):  dextrose Oral Gel 15 Gram(s) Oral once PRN Blood Glucose LESS THAN 70 milliGRAM(s)/deciliter  metoclopramide Injectable 10 milliGRAM(s) IV Push every 6 hours PRN nausea vomiting  polyethylene glycol 3350 17 Gram(s) Oral two times a day PRN Constipation  sodium chloride 0.9% lock flush 10 milliLiter(s) IV Push every 1 hour PRN Pre/post blood products, medications, blood draw, and to maintain line patency      CAPILLARY BLOOD GLUCOSE      POCT Blood Glucose.: 168 mg/dL (19 Apr 2024 21:25)  POCT Blood Glucose.: 201 mg/dL (19 Apr 2024 18:38)  POCT Blood Glucose.: 232 mg/dL (19 Apr 2024 12:34)  POCT Blood Glucose.: 321 mg/dL (19 Apr 2024 09:02)    I&O's Summary    19 Apr 2024 07:01  -  20 Apr 2024 07:00  --------------------------------------------------------  IN: 840 mL / OUT: 1300 mL / NET: -460 mL        PHYSICAL EXAM:  Vital Signs Last 24 Hrs  T(C): 36.8 (20 Apr 2024 03:42), Max: 37.2 (19 Apr 2024 16:33)  T(F): 98.3 (20 Apr 2024 03:42), Max: 98.9 (19 Apr 2024 16:33)  HR: 107 (20 Apr 2024 03:42) (100 - 110)  BP: 96/56 (20 Apr 2024 03:42) (91/55 - 104/51)  BP(mean): --  RR: 18 (20 Apr 2024 03:42) (18 - 18)  SpO2: 98% (20 Apr 2024 03:42) (94% - 98%)    Parameters below as of 20 Apr 2024 03:42  Patient On (Oxygen Delivery Method): nasal cannula  O2 Flow (L/min): 3      PHYSICAL EXAM:  CONSTITUTIONAL: NAD, grossly erythematous  HEET: MMM, EOMI, PERRLA  NECK: supple  RESPIRATORY: Normal respiratory effort; bibasilar crackles   CARDIOVASCULAR: +punch biopsy on chest, no bleeding. Regular rate and rhythm, normal S1 and S2, chronic lymphedema   ABDOMEN: Nontender to palpation, normoactive bowel sounds, no rebound/guarding;  MUSCULOSKELETAL: full ROM   NEURO: Moving all four extremities, sensation grossly intact  PSYCH: A+O to person, place, and time; affect appropriate  SKIN: psoriatic lesions on b/l UE and lymphedema rashes on LE. flushed skin, improved     LABS:                        10.3   8.82  )-----------( 119      ( 19 Apr 2024 06:45 )             33.1     04-19    131<L>  |  89<L>  |  79<H>  ----------------------------<  298<H>  5.1   |  26  |  4.35<H>    Ca    8.8      19 Apr 2024 06:45  Phos  3.8     04-19  Mg     2.4     04-19    TPro  6.2  /  Alb  2.9<L>  /  TBili  1.1  /  DBili  x   /  AST  12  /  ALT  7<L>  /  AlkPhos  56  04-19          Urinalysis Basic - ( 19 Apr 2024 06:45 )    Color: x / Appearance: x / SG: x / pH: x  Gluc: 298 mg/dL / Ketone: x  / Bili: x / Urobili: x   Blood: x / Protein: x / Nitrite: x   Leuk Esterase: x / RBC: x / WBC x   Sq Epi: x / Non Sq Epi: x / Bacteria: x

## 2024-04-20 NOTE — PROGRESS NOTE ADULT - PROBLEM SELECTOR PLAN 7
Erythematous appearing but pt states improved from prior? Chronic, possible infection though no systemic signs.     - ctm  - elevate legs as tolerated, encourage ambulation  - compression stockings, ace wrapping  - Derm and wound care following

## 2024-04-20 NOTE — CHART NOTE - NSCHARTNOTEFT_GEN_A_CORE
72 y/o F with PMHx of HFrEF (EF 30%), mod AS, known 1st degree AVB and LBBB, HTN, IDDM1, CKD, hypothyroidism, chronic lymphedema, suspected OHS/LELIA on 3L NC home O2 p/w 1 episode of syncope. Recent admission at \[facility]\"" (3/29-4/5) for ADHF and DUNCAN s/p diuresis.   Pt states she does not remember her syncopal episode. She remembers walking to the car and getting into it. She denies any preceding symptoms. She was told that she "blacked out" and was not responding for 10-15 seconds with arm shaking. EKG consistent with sinus bradycardia at 58bpm, 1st degree AVB (AUDRA 318ms) with LBBB (QRSd 176). Early AM 4/14, she was noted to have 2:1 AV block. Pt also with e/o Wenckebach on 4/14.  She was on Toprol XL 100mg at home but has not taken it since 4/12.    Tele review overnight/this AM: SR/ST 80's - 110bpm    1) Syncope  2) 2:1 AV block with known 1st degree AVB (Audra 318ms) with LBBB (QRSd 176)  3) HFrEF with LVEF 30%  4) Aortic stenosis, likely severe in setting of decreased LVEF  5) Lymphedema with significant LE edema and rash  6) Fever 4/16, undergoing infectious workup    Recommendations:  - NPO s/p mn for leadless PPM implant on Monday 4/22. Maintain active T&S x2.  Ultimately patient may warrant CRT-D placement noting conduction disease and cardiomyopathy with LVEF 30% and LBBB with QRSd 176. However, at this time she is not a candidate for CRT at this time noting infectious risk due to LE swelling/cellulitis and ESRD on HD via temporary HDC.   - Hold morning dose of SQH on day of procedure  - ID following for potential infectious etiology of fever. Likely allergic reaction to contrast. Off antibiotics. No further fevers noted  - BB on hold

## 2024-04-20 NOTE — PROGRESS NOTE ADULT - PROBLEM SELECTOR PLAN 2
syncope 2/2 mod to severe AS     Plan:  -structural cards following - CTA imagings performed for TAVR  -will need PPM (tentative plan for 4/22) prior to TAVR per EP due to conduction disease,  -CTM on telemetry

## 2024-04-20 NOTE — PROGRESS NOTE ADULT - ATTENDING COMMENTS
73 year old female with PMH heart failure with reduced ejection fraction, AS, HTN, DM1 and hypothyroidism, who presented with syncope likely due to AS. While undergoing CT images for TAVR workup, she had an erythrodermic drug eruption possibly due to the contrast. Allergy and dermatology have been consulted, recs appreciated. Will follow up derm biopsy results. Will proceed with PPM on Monday followed by TAVR on Wednesday. Rest of plan as above.

## 2024-04-20 NOTE — PROGRESS NOTE ADULT - PROBLEM SELECTOR PLAN 8
home lantus 33u qhs    - endocrine consulted; recs appreciated  - Lantus 18u qhs  - Added Admelog 4u TID   - QUE, FSG ACHS or q6h if NPO; goal glucose   - CTM Dexcom for glucose monitoring

## 2024-04-20 NOTE — PROGRESS NOTE ADULT - PROBLEM SELECTOR PLAN 10
Hospital Bundle    Fluids: FLUID RESTRICT 1.5L/day  Electrolytes: Replete K > 4, Mg > 2, Phos > 3  Nutrition: Diet: CC   PPX  ---VTE: HSQ  ---Resp: c/w home O2  Code Status: full code  Dispo: pending clinical improvement

## 2024-04-20 NOTE — PROGRESS NOTE ADULT - PROBLEM SELECTOR PLAN 4
Unclear baseline SCr likely 2.1-2.2  Now with increasing Cr. and anuric    Plan:  - trend BUN/SCr, avoid nephrotoxic, renally dose all meds  - strict I/Os  - on HD  - hardy placed for accurate I/O and AUR, maintain for now  - shiley access obtained 4/18 w IR, HD 4/18, output of 500cc  - F/u nephro for HD plans Unclear baseline SCr likely 2.1-2.2  Now with increasing Cr. and anuric    Plan:  - trend BUN/SCr, avoid nephrotoxic, renally dose all meds  - strict I/Os  - hardy placed for accurate I/O and AUR, maintain for now  - on HD for volume optimization pre procedure

## 2024-04-20 NOTE — PROGRESS NOTE ADULT - PROBLEM SELECTOR PLAN 1
RRT 4/16 for sepsis rectal temp 102, hypotension to systolic 70s, no leukocytosis, with new onset vomiting and rash  DDx inc: Delayed contrast reaction vs infection  infectious workup neg.  -Derm punch bx 4/17 prelim result consistent with AGEP    Plan:  - monitor off abx  - will hold off on Vanc for possible red man syndrome but unclear onset of rash timing   - Consider steroids, pending final derm punch bx results.  - CT Adrenal protocol to eval for adrenal causes

## 2024-04-20 NOTE — PROGRESS NOTE ADULT - NUTRITIONAL ASSESSMENT
This patient has been assessed with a concern for Malnutrition and has been determined to have a diagnosis/diagnoses of Morbid obesity (BMI > 40).    This patient is being managed with:   Diet NPO-  NPO for Procedure/Test     NPO Start Date: 21-Apr-2024   NPO Start Time: 23:59  Except Medications  Entered: Apr 19 2024 11:14AM    Diet Regular-  Consistent Carbohydrate {Evening Snack} (CSTCHOSN)  DASH/TLC {Sodium & Cholesterol Restricted} (DASH)  1500mL Fluid Restriction ([id number])  Entered: Apr 19 2024  7:44AM

## 2024-04-20 NOTE — PROGRESS NOTE ADULT - SUBJECTIVE AND OBJECTIVE BOX
[facility]   [phone]  RENAL FOLLOW UP NOTE  --------------------------------------------------------------------------------  Chief Complaint:    24 hour events/subjective:    seen earlier   states nausea has dissipated     PAST HISTORY  --------------------------------------------------------------------------------  No significant changes to PMH, PSH, FHx, SHx, unless otherwise noted    ALLERGIES & MEDICATIONS  --------------------------------------------------------------------------------  Allergies    contrast media (iodine-based) (Fever; Vomiting; Flushing; Hypotension; Rash; Stomach Upset)  Ativan (Rash; Urticaria)  penicillins (Hives (Mod to Severe); Anaphylaxis (Mod to Severe); Short breath (Mod to Severe); Angioedema (Mod to Severe))    Intolerances      Standing Inpatient Medications  ascorbic acid 500 milliGRAM(s) Oral daily  aspirin  chewable 81 milliGRAM(s) Oral daily  atorvastatin 80 milliGRAM(s) Oral at bedtime  calamine/zinc oxide Lotion 1 Application(s) Topical three times a day  cetirizine 10 milliGRAM(s) Oral daily  chlorhexidine 4% Liquid 1 Application(s) Topical <User Schedule>  dextrose 10% Bolus 125 milliLiter(s) IV Bolus once  dextrose 5%. 1000 milliLiter(s) IV Continuous <Continuous>  dextrose 5%. 1000 milliLiter(s) IV Continuous <Continuous>  dextrose 50% Injectable 12.5 Gram(s) IV Push once  dextrose 50% Injectable 25 Gram(s) IV Push once  diphenhydrAMINE Injectable 50 milliGRAM(s) IV Push every 6 hours  ferrous    sulfate 325 milliGRAM(s) Oral two times a day  glucagon  Injectable 1 milliGRAM(s) IntraMuscular once  heparin   Injectable 5000 Unit(s) SubCutaneous every 8 hours  insulin glargine Injectable (LANTUS) 18 Unit(s) SubCutaneous at bedtime  insulin lispro (ADMELOG) corrective regimen sliding scale   SubCutaneous three times a day before meals  insulin lispro (ADMELOG) corrective regimen sliding scale   SubCutaneous at bedtime  insulin lispro Injectable (ADMELOG) 4 Unit(s) SubCutaneous three times a day before meals  levothyroxine 75 MICROGram(s) Oral daily  multivitamin 1 Tablet(s) Oral daily  mupirocin 2% Nasal 1 Application(s) Both Nostrils two times a day  nystatin    Suspension 000147 Unit(s) Oral four times a day  nystatin/triamcinolone Cream 1 Application(s) Topical every 12 hours  petrolatum white Ointment 1 Application(s) Topical three times a day  senna 2 Tablet(s) Oral at bedtime  triamcinolone 0.1% Ointment 1 Application(s) Topical two times a day    PRN Inpatient Medications  dextrose Oral Gel 15 Gram(s) Oral once PRN  metoclopramide Injectable 10 milliGRAM(s) IV Push every 6 hours PRN  polyethylene glycol 3350 17 Gram(s) Oral two times a day PRN  sodium chloride 0.9% lock flush 10 milliLiter(s) IV Push every 1 hour PRN      REVIEW OF SYSTEMS  --------------------------------------------------------------------------------    Gen: denies  fevers/chills,  CVS: denies chest pain/palpitations  Resp: denies SOB/Cough  GI: Denies N/V/Abd pain  : Denies dysuria/ states no uo     VITALS/PHYSICAL EXAM  --------------------------------------------------------------------------------  T(C): 36.2 (04-20-24 @ 16:00), Max: 37.1 (04-19-24 @ 21:34)  HR: 76 (04-20-24 @ 16:00) (76 - 110)  BP: 124/66 (04-20-24 @ 16:00) (91/55 - 124/66)  RR: 18 (04-20-24 @ 16:00) (18 - 18)  SpO2: 97% (04-20-24 @ 16:00) (94% - 99%)  Wt(kg): --        04-19-24 @ 07:01  -  04-20-24 @ 07:00  --------------------------------------------------------  IN: 840 mL / OUT: 1300 mL / NET: -460 mL    04-20-24 @ 07:01  -  04-20-24 @ 20:07  --------------------------------------------------------  IN: 1400 mL / OUT: 2000 mL / NET: -600 mL      Physical Exam:  	    Gen:  on O2, facial erythema, neck and arm erythema,   	Pulm: decrease bs  no rales or ronchi or wheezing  	CV: No JVD. RRR, S1S2; no rub II/VI M   	Abd: +BS, soft, nontender/nondistended, obese   	: No suprapubic tenderness  	UE: Warm, no cyanosis  no clubbing,  no edema  	LE: Warm, no cyanosis  no clubbing, 4+ edema, B/L LE raised red plaque   	Neuro: alert and oriented. speech coherent               Vascular Access: RIJ non tunneled HD catheter    LABS/STUDIES  --------------------------------------------------------------------------------              10.2   8.83  >-----------<  119      [04-20-24 @ 08:36]              34.4     131  |  92  |  68  ----------------------------<  262      [04-20-24 @ 08:36]  5.0   |  26  |  4.14        Ca     8.8     [04-20-24 @ 08:36]      Mg     2.3     [04-20-24 @ 08:36]      Phos  3.2     [04-20-24 @ 08:36]    TPro  6.3  /  Alb  3.2  /  TBili  0.9  /  DBili  x   /  AST  13  /  ALT  7   /  AlkPhos  59  [04-20-24 @ 08:36]          Creatinine Trend:  SCr 4.14 [04-20 @ 08:36]  SCr 4.35 [04-19 @ 06:45]  SCr 4.55 [04-18 @ 16:42]  SCr 4.25 [04-18 @ 09:24]  SCr 3.62 [04-17 @ 11:00]    PTH -- (Ca 8.4)      [04-19-24 @ 17:54]   109  TSH 5.06      [04-14-24 @ 07:18]  Lipid: chol 74, TG 70, HDL 33, LDL --      [04-13-24 @ 07:05]

## 2024-04-20 NOTE — PROGRESS NOTE ADULT - ASSESSMENT
73F w HFrEF (EF 30%), mod AS, known LBBB, HTN, IDDM1, CKD, hypothyroidism, chronic lymphedema, suspected OHS/LELIA on 3L NC home O2 p/w 1 episode of syncope. Recent admission at [facility] (3/29-4/5) for ADHF and DUNCAN s/p diuresis, discharged with new home O2 3L NC suspecting OHS/LELIA pending outpatient w/u. Since discharge a week ago, she has been staying at home with   Pt had sob walking to car. Once in car, she was still breathing heavily. Partner noticed pt was not responding to verbal stimuli for 10-15sec and witnessed R arm jerking. Pt gained consciousness quickly without postictal symptoms. Pt went to Cardiologist Dr. Nathan who recommended pt to come to ED. No fever, cp, abd pain, n/v. Leg swelling is improved from prior. Pt on torsemide 40mg which she has been taking. Pt was discharged on 3LNC which she is currently on. Patient reports she did not take Farxiga that she was discharged on as she was concerned about side effects.     In ED, patient was found afebrile, hemodynamically stable, breathing well on 3L NC. Initial labs notable for mild hyponatremia, DUNCAN on CKD, elevated proBNP and elevated pCO2 both improved from prior.  pt also noticed with abn creatinine      1- CKD IV  with DUNCAN   2- CHF   3- lymphedema   4- severe AS   5- syncope         suspect ATN from hypotension along with contrast nephropathy   creatinine worsening requiring renal replacement therapy, HD initiated 4/18  plan for HD today  dialyzer F 160 2 k bath bfr 250 dfr 500 1 liter fluid removal   k in range  ID following, no abx at this time  EP consulted for syncope, recommending device placement for bradycardia  structural heart team following, TAVR work up   derm following for rash  cont O2 and tele monitor   trend creatinine and electrolytes daily

## 2024-04-21 NOTE — PROGRESS NOTE ADULT - TIME BILLING
- Review of records, telemetry, vital signs and daily labs.   - General and cardiovascular physical examination.  - Generation of cardiovascular treatment plan.  - Coordination of care.      Patient was seen and examined by me on  04/21/2024,interim events noted,labs and radiology studies reviewed.  Cuco Tubsb MD,FACC.  [address] [place], [place] [id number].  [phone]

## 2024-04-21 NOTE — PROGRESS NOTE ADULT - PROBLEM SELECTOR PLAN 3
TTE (4/2024) LVEF 30% w mod LVH, mildly reduced RV, AS (0.61 cm2), mod pHTN.   On home Torsemide 40 mg daily, Toprol  mg daily, Farxiga 5mg daily (never took due to concerns for side effects). Follows w Dr. Cherise rosas eventual plan to start further GDMT as renally tolerated    - strict I/Os, daily weights, fluid restrict 1.5L/day  - dc'd home torsemide 40mg daily  - held metoprolol iso hypotension Unclear baseline SCr likely 2.1-2.2  Now with increasing Cr. and anuric    Plan:  - trend BUN/SCr, avoid nephrotoxic, renally dose all meds  - strict I/Os  - hardy placed for accurate I/O and AUR, maintain for now  - on HD for volume optimization pre procedure

## 2024-04-21 NOTE — PROGRESS NOTE ADULT - ASSESSMENT
74yo woman with PMHx of HFrEF (EF 30%), mod AS, known 1st degree AVB and LBBB, HTN, IDDM1, CKD, hypothyroidism, chronic lymphedema, suspected OHS/LELIA on 3L NC home O2 p/w 1 episode of syncope. Recent admission at [facility] (3/29-4/5) for ADHF and DUNCAN s/p diuresis. Pt states she does not remember her syncopal episode. She remembers walking to the car and getting into it. She denies any preceding symptoms. She was told that she "blacked out" and was not responding for 10-15 seconds with arm shaking. There is documentation that pt was dyspneic upon getting into car after walking but pt did not recall that on EP consultation 4/15.  Pt went to her cardiologist Dr. Nathan who recommended pt to come to ED. EKG consistent with sinus bradycardia at 58bpm, 1st degree AVB (Sharath 318ms) with LBBB (QRSd 176). Early AM 4/14, she was noted to have 2:1 AV block. Pt also with Elizabeth several times throughout the day on 4/14. She is being worked up for her aortic stenosis, likely severe in setting of decreased LVEF. She has significant LE edema and history of lymphedema with rash on lower legs. She was on Toprol XL 100mg at home but has not taken in since 4/12. Plan for leadless PPM tomorrow followed at some point by TAVR. NPO after midnight prior to procedure. Active T&S x2, coags prior to PPM. Hold morning dose of SQ heparin on day of procedure. Pt may warrant CRT-D placement noting conduction disease and cardiomyopathy with LVEF 30% and LBBB with QRSd 176. However, pt is not a candidate for CRT at this time noting infectious risk due to LE swelling/cellulitis risks in the setting of likely ESRD. Only safe option would be AV Micra as a temporizing measure. If pt is able to eventually undergo Micra AV placement and then TAVR, she could be reassessed down the line for the need of transvenous device with conduction sytem or CRT pacing if needed.
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
## 2024-04-21 NOTE — PROGRESS NOTE ADULT - ASSESSMENT
73F w HFrEF (EF 30%), mod AS, known LBBB, HTN, T1DM, CKD, hypothyroidism, chronic lymphedema, suspected OHS/LELIA on 3L NC home O2 p/w 1 episode of syncope with R arm jerking, likely 2/2 severe symptomatic AS. CTA imagings performed for TAVR eval, c/b DUNCAN on CKD. C/b febrile and hypotension, sepsis workup pending. CT c/a/p w/w/o IV contrast revealed incidental finding of L adrenal nodule.    #L adrenal nodule   Incidentally found on CT c/a/p w/wo IV contrast done for TAVR evaluation. The left adrenal gland is thickened and a 1.2 x 0.8 cm left adrenal nodule is seen. The right adrenal gland is normal.  Primary team discussed with radiology. HU of the adrenal nodule not able to be accurately determined due to motion artifact, also given imaging was taken at venous phase.     Reports no prior known hx of adrenal nodule.  Has HTN, normally well controlled on BP. Inpatient, she has been hypotensive. No episodic tachycardia, headaches, sweating. Denies weight changes prior to hospitalization. Denies easy bruising, bleeding normally although has had bruising with heparin shots inpatient.    Recommendations:  - Test for excess adrenal hormones:  = follow up plasma metanephrines  = serum aldosterone is elevated to 37.4. Follow up plasma renin activity.  = Dex suppression with am cortisol and ACTH above goal. Will await dex level to assess for appropriate suppression. In the meantime can check midnight salivary cortisol level and 24 hour urine cortisol and creatinine as confirmatory testing to r/o Cushings'  = DHEAS 139 wnl. Follow up androstenedione   - Nonurgent CT non-contrast adrenal protocol to determine hounsfield units of adrenal nodule. (Of note, CT features suggesting malignancy include HU >10, mass size >4cm, and >50% contrast retention seen 10 minutes after contrast administration (contrast retention is of low specificity/sensitivity).)  - Will need follow up outpatient with Dr. Luis Dumont      #T1DM  Has hx of T1DM since age 26  Endocrinologist: Dr. Luis Dumont  Home regimen: Lantus 33 units qhs, Novolog based on sliding scale TIDAC normally 3-5 units  Has Dexcom G7  A1c is 7.4%  Uptrending BG to 300 today possibly due to starting CLD and likely also effect of dexamethasone  - Inpatient BG goal 100-180  - Monitor on Lantus 18 units QHS (Can give 15 units if NPO).   -Monitor on admelog 4 units TIDAC; HOLD if NPO or not eating  - Low dose admelog correction scale TIDAC  - Low dose admelog correction scale QHS  - on CC diet with evening snack  - Of note, patient instructed on discharge from recent hospitalization at [place] to start farxiga for CHF. Given risks of DKA of SGLT2i in T1DM, would not start until better data is available for its benefits.  - Discharge regimen: home basal/bolus insulin, dose TBD.  - Follow up with Dr. Luis Dumont outpatient      #Hx of Hypothyroidism  TSH slightly elevated to 4.79-5.06 with normal FT4 1.3-1.5  - continue home LT4 75mcg daily  - repeat TFT's outpatient when clinically stabilized        Tay Ross D.O  [phone]

## 2024-04-21 NOTE — PROGRESS NOTE ADULT - ASSESSMENT
73F w HFrEF (EF 30%), mod AS, known LBBB, HTN, IDDM1, CKD, hypothyroidism, chronic lymphedema, p/w 1 episode of syncope with R arm jerking.     #Syncope-Aortic Stenosis  - Episode after exertion  - Known Aortic Stenosis likely Severe in setting of decreased LVEF  - TAVR likely late next week     #Chronic Systolic HF (EF 30%), mod to severe AS, HTN, LBBB, Lymphedema   - Has known systolic HF and AS.    - Repeat TTE showed EF 30%, mild-mod LVH, mildly reduced RVF, mod-severe AS (.61 cmsq), mod pHTN  - Had cath 2022-Non obstructive CAD  - On home Torsemide 20 mg daily, Eplerenone 25 mg daily, and Toprol  mg daily  - Compliant with all meds including Torsemide, though, dose was reduced to Furosemide 40mg once daily.   - proBNP 51K from 80k.   - Consideration for TAVR  - Was on bumex gtt but then overnight 4/15 developed a fever to 102F and became hypotensive overnight. Bumex gtt stopped, given 250cc bolus. BPs remain low. UOP has dropped. I suspect she is intravascularly depleted.   - As per ID team, fever though to be 2/2 allergic rxn to contrast, rec to hold abx    # Acute kidney injury superimposed on CKD.   - now with worsening Cr  - HD initiated-tolerating  - vol removal with HD     #LBBB  Known LBBB  Noted intermittent Wenkebach on telemetry with bradycardia and 2:1 AV conduction  - EP has been consulted. For possible CRT-D vs micra tomorrow- BB on hold    Will continue to follow closely.

## 2024-04-21 NOTE — PROGRESS NOTE ADULT - SUBJECTIVE AND OBJECTIVE BOX
Subjective  Patient resting comfortably in bed.     Past Medical History    PAST MEDICAL & SURGICAL HISTORY:  Hypertension      Diabetes      Lymphedema      H/O left bundle branch block      History of left bundle branch block (LBBB)      Systolic heart failure, chronic      Type 1 diabetes      H/O cataract  2020      History of surgical removal of meniscus of knee  left in 1971      Frozen shoulder  2000      H/O Achilles tendon repair  lengthened bilaterally, 2000      Fractured skull  1968          MEDICATIONS:  aspirin  chewable 81 milliGRAM(s) Oral daily  heparin   Injectable 5000 Unit(s) SubCutaneous every 8 hours    nystatin    Suspension [id number] Unit(s) Oral four times a day    cetirizine 10 milliGRAM(s) Oral daily  diphenhydrAMINE Injectable 50 milliGRAM(s) IV Push every 6 hours    metoclopramide Injectable 10 milliGRAM(s) IV Push every 6 hours PRN    polyethylene glycol 3350 17 Gram(s) Oral two times a day PRN  senna 2 Tablet(s) Oral at bedtime    atorvastatin 80 milliGRAM(s) Oral at bedtime  dextrose 50% Injectable 12.5 Gram(s) IV Push once  dextrose 50% Injectable 25 Gram(s) IV Push once  dextrose Oral Gel 15 Gram(s) Oral once PRN  glucagon  Injectable 1 milliGRAM(s) IntraMuscular once  insulin glargine Injectable (LANTUS) 18 Unit(s) SubCutaneous at bedtime  insulin lispro (ADMELOG) corrective regimen sliding scale   SubCutaneous at bedtime  insulin lispro (ADMELOG) corrective regimen sliding scale   SubCutaneous three times a day before meals  insulin lispro Injectable (ADMELOG) 4 Unit(s) SubCutaneous three times a day before meals  levothyroxine 75 MICROGram(s) Oral daily    ascorbic acid 500 milliGRAM(s) Oral daily  calamine/zinc oxide Lotion 1 Application(s) Topical three times a day  chlorhexidine 4% Liquid 1 Application(s) Topical <User Schedule>  dextrose 10% Bolus 125 milliLiter(s) IV Bolus once  dextrose 5%. 1000 milliLiter(s) IV Continuous <Continuous>  dextrose 5%. 1000 milliLiter(s) IV Continuous <Continuous>  ferrous    sulfate 325 milliGRAM(s) Oral two times a day  multivitamin 1 Tablet(s) Oral daily  mupirocin 2% Nasal 1 Application(s) Both Nostrils two times a day  nystatin/triamcinolone Cream 1 Application(s) Topical every 12 hours  petrolatum white Ointment 1 Application(s) Topical three times a day  sodium chloride 0.9% lock flush 10 milliLiter(s) IV Push every 1 hour PRN  triamcinolone 0.1% Ointment 1 Application(s) Topical two times a day        Allergies    contrast media (iodine-based) (Fever; Vomiting; Flushing; Hypotension; Rash; Stomach Upset)  Ativan (Rash; Urticaria)  penicillins (Hives (Mod to Severe); Anaphylaxis (Mod to Severe); Short breath (Mod to Severe); Angioedema (Mod to Severe))    Intolerances        FAMILY HISTORY:  Family history of CVA  mom, age 57        VITAL SIGNS  T(C): 36.2 (04-21-24 @ 13:58), Max: 36.7 (04-21-24 @ 00:09)  HR: 96 (04-21-24 @ 13:58) (76 - 97)  BP: 103/67 (04-21-24 @ 13:58) (93/54 - 124/66)  RR: 18 (04-21-24 @ 13:58) (18 - 18)  SpO2: 98% (04-21-24 @ 13:58) (97% - 100%)  Wt(kg): --    Appearance: NAD, no distress. Obese.   HEENT: Moist Mucous Membranes, Anicteric, PERRL, EOMI  Cardiovascular: Regular rate and rhythm, Normal S1 S2, No JVD, 3/6 systolic murmur absent A2  Chest: Right sided HD catheter  Respiratory: Lungs clear to auscultation. No rales, No rhonchi, No wheezing. No tenderness to palpation  Gastrointestinal:  Soft, Non-tender, + BS  Neurologic: Non-focal, A&Ox3  Skin: Warm and dry, No rashes, No ecchymosis, No cyanosis  Musculoskeletal: No clubbing, No cyanosis, + lower extremity edema; legs wrapped.   : Frederic  Psychiatry: Mood & affect appropriate    I&O's Summary    20 Apr 2024 07:01  -  21 Apr 2024 07:00  --------------------------------------------------------  IN: 1800 mL / OUT: 2200 mL / NET: -400 mL    21 Apr 2024 07:01  -  21 Apr 2024 15:48  --------------------------------------------------------  IN: 600 mL / OUT: 75 mL / NET: 525 mL        LABORATORY VALUES	 	                          10.5   10.09 )-----------( 135      ( 21 Apr 2024 11:38 )             34.6       04-21    134<L>  |  94<L>  |  56<H>  ----------------------------<  150<H>  4.3   |  26  |  4.02<H>  04-20    131<L>  |  92<L>  |  68<H>  ----------------------------<  262<H>  5.0   |  26  |  4.14<H>    Ca    8.7      21 Apr 2024 11:38  Ca    8.8      20 Apr 2024 08:36  Phos  2.5     04-21  Phos  3.2     04-20  Mg     2.2     04-21  Mg     2.3     04-20    TPro  6.5  /  Alb  3.2<L>  /  TBili  0.7  /  DBili  x   /  AST  11  /  ALT  7<L>  /  AlkPhos  58  04-21  TPro  6.3  /  Alb  3.2<L>  /  TBili  0.9  /  DBili  x   /  AST  13  /  ALT  7<L>  /  AlkPhos  59  04-20    LIVER FUNCTIONS - ( 21 Apr 2024 11:38 )  Alb: 3.2 g/dL / Pro: 6.5 g/dL / ALK PHOS: 58 U/L / ALT: 7 U/L / AST: 11 U/L / GGT: x               CARDIAC MARKERS:    04-13 @ 07:05  Cholesterol, Serum - 74  Direct LDL- --  HDL Cholesterol, Serum- 33  Triglycerides, Serum- 70      Thyroid Stimulating Hormone, Serum: 5.06 uIU/mL (04-14 @ 07:18)      Urinalysis Basic - ( 21 Apr 2024 11:38 )    Color: x / Appearance: x / SG: x / pH: x  Gluc: 150 mg/dL / Ketone: x  / Bili: x / Urobili: x   Blood: x / Protein: x / Nitrite: x   Leuk Esterase: x / RBC: x / WBC x   Sq Epi: x / Non Sq Epi: x / Bacteria: x      CAPILLARY BLOOD GLUCOSE      POCT Blood Glucose.: 130 mg/dL (21 Apr 2024 12:27)    04-16 @ 14:57  Culture - Blood: --  Gram Stain Blood: --  Method Type: CATRACHITA  Organism: Escherichia coli  Organism Identification: Escherichia coli          TELEMETRY: 	Sinus rhythm at 90 first degree AV delay.     ECG:  	  RADIOLOGY:  OTHER:

## 2024-04-21 NOTE — PROGRESS NOTE ADULT - PROBLEM SELECTOR PLAN 1
RRT 4/16 for sepsis rectal temp 102, hypotension to systolic 70s, no leukocytosis, with new onset vomiting and rash  DDx inc: Delayed contrast reaction vs infection  infectious workup neg.  -Derm punch bx 4/17 prelim result consistent with AGEP    Plan:  - monitor off abx  - will hold off on Vanc for possible red man syndrome but unclear onset of rash timing   - Consider steroids, pending final derm punch bx results.  - CT Adrenal protocol to eval for adrenal causes syncope 2/2 mod to severe AS     Plan:  -structural cards following - CTA imagings performed for TAVR  -will need PPM (tentative plan for 4/22) prior to TAVR per EP due to conduction disease,  -CTM on telemetry

## 2024-04-21 NOTE — PROGRESS NOTE ADULT - NUTRITIONAL ASSESSMENT
This patient has been assessed with a concern for Malnutrition and has been determined to have a diagnosis/diagnoses of Morbid obesity (BMI > 40).    This patient is being managed with:   Diet NPO-  NPO for Procedure/Test     NPO Start Date: 21-Apr-2024   NPO Start Time: 23:59  Except Medications  Entered: Apr 19 2024 11:14AM    Diet Regular-  Consistent Carbohydrate {Evening Snack} (CSTCHOSN)  DASH/TLC {Sodium & Cholesterol Restricted} (DASH)  1500mL Fluid Restriction ([id number])  Entered: Apr 19 2024  7:44AM

## 2024-04-21 NOTE — PROGRESS NOTE ADULT - PROBLEM SELECTOR PLAN 2
syncope 2/2 mod to severe AS     Plan:  -structural cards following - CTA imagings performed for TAVR  -will need PPM (tentative plan for 4/22) prior to TAVR per EP due to conduction disease,  -CTM on telemetry TTE (4/2024) LVEF 30% w mod LVH, mildly reduced RV, AS (0.61 cm2), mod pHTN.   On home Torsemide 40 mg daily, Toprol  mg daily, Farxiga 5mg daily (never took due to concerns for side effects). Follows w Dr. Cherise rosas eventual plan to start further GDMT as renally tolerated    - strict I/Os, daily weights, fluid restrict 1.5L/day  - dc'd home torsemide 40mg daily  - held metoprolol iso hypotension

## 2024-04-21 NOTE — PROGRESS NOTE ADULT - NUTRITIONAL ASSESSMENT
This patient has been assessed with a concern for Malnutrition and has been determined to have a diagnosis/diagnoses of Morbid obesity (BMI > 40).    This patient is being managed with:   Diet NPO-  NPO for Procedure/Test     NPO Start Date: 21-Apr-2024   NPO Start Time: 23:59  Except Medications  Entered: Apr 19 2024 11:14AM    Diet Regular-  Consistent Carbohydrate {Evening Snack} (CSTCHOSN)  DASH/TLC {Sodium & Cholesterol Restricted} (DASH)  1500mL Fluid Restriction ([id number])  Entered: Apr 19 2024  7:44AM

## 2024-04-21 NOTE — PROVIDER CONTACT NOTE (OTHER) - ASSESSMENT
Patient A&Ox4. Patient endorses poor oral fluid intake due to fluid restrictions. VSS, patient afebrile. Patient A&Ox4. Patient endorses poor oral fluid intake due to fluid restrictions. VSS, patient afebrile. Urine dark dixon, concentrated, with some particles at the bottom.

## 2024-04-21 NOTE — PROGRESS NOTE ADULT - PROBLEM SELECTOR PLAN 5
CTA CAP incidentally noted for L adrenal thickened w a 1.2 x 0.8 cm left adrenal nodule is seen.  - 1mg IV dexamethasone administered 4/17 10PM, f/u 8AM serum cortisol wnl    Plan:    - endo consulted; recs appreciated  - repeat AM cortisol low, f/u endo recs CTA CAP incidentally noted for L adrenal thickened w a 1.2 x 0.8 cm left adrenal nodule is seen.  - 1mg IV dexamethasone administered 4/17 10PM, f/u 8AM serum cortisol wnl    Plan:    - endo consulted; recs appreciated  - repeat AM cortisol low, f/u endo recs  - pending CT adrenal protocol w/o contrast to eval for adrenal causes of sepsis

## 2024-04-21 NOTE — PROGRESS NOTE ADULT - ASSESSMENT
73F w HFrEF (EF 30%), mod AS, known LBBB, HTN, IDDM1, CKD, hypothyroidism, chronic lymphedema, suspected OHS/LELIA on 3L NC home O2 p/w 1 episode of syncope. Recent admission at [facility] (3/29-4/5) for ADHF and DUNCAN s/p diuresis, discharged with new home O2 3L NC suspecting OHS/LELIA pending outpatient w/u. Since discharge a week ago, she has been staying at home with   Pt had sob walking to car. Once in car, she was still breathing heavily. Partner noticed pt was not responding to verbal stimuli for 10-15sec and witnessed R arm jerking. Pt gained consciousness quickly without postictal symptoms. Pt went to Cardiologist Dr. Nathan who recommended pt to come to ED. No fever, cp, abd pain, n/v. Leg swelling is improved from prior. Pt on torsemide 40mg which she has been taking. Pt was discharged on 3LNC which she is currently on. Patient reports she did not take Farxiga that she was discharged on as she was concerned about side effects.     In ED, patient was found afebrile, hemodynamically stable, breathing well on 3L NC. Initial labs notable for mild hyponatremia, DUNCAN on CKD, elevated proBNP and elevated pCO2 both improved from prior.  pt also noticed with abn creatinine      1- CKD IV  with DUNCAN   2- CHF   3- lymphedema   4- severe AS   5- syncope         suspect ATN from hypotension along with contrast nephropathy   creatinine worsening requiring renal replacement therapy, HD initiated 4/18  plan for HD  in am   k in range check k in am  given recent hyperkalemia low k diet   ID following, no abx at this time  EP consulted for syncope, recommending device placement for bradycardia  structural heart team following, TAVR work up   derm following for rash  cont  tele monitor

## 2024-04-21 NOTE — PROGRESS NOTE ADULT - SUBJECTIVE AND OBJECTIVE BOX
[facility]   [phone]  RENAL FOLLOW UP NOTE  --------------------------------------------------------------------------------  Chief Complaint:    24 hour events/subjective:    seen earlier   her partner at her bedside  states breathing is not worse     PAST HISTORY  --------------------------------------------------------------------------------  No significant changes to PMH, PSH, FHx, SHx, unless otherwise noted    ALLERGIES & MEDICATIONS  --------------------------------------------------------------------------------  Allergies    contrast media (iodine-based) (Fever; Vomiting; Flushing; Hypotension; Rash; Stomach Upset)  Ativan (Rash; Urticaria)  penicillins (Hives (Mod to Severe); Anaphylaxis (Mod to Severe); Short breath (Mod to Severe); Angioedema (Mod to Severe))    Intolerances      Standing Inpatient Medications  ascorbic acid 500 milliGRAM(s) Oral daily  aspirin  chewable 81 milliGRAM(s) Oral daily  atorvastatin 80 milliGRAM(s) Oral at bedtime  calamine/zinc oxide Lotion 1 Application(s) Topical three times a day  cetirizine 10 milliGRAM(s) Oral daily  chlorhexidine 4% Liquid 1 Application(s) Topical <User Schedule>  dextrose 10% Bolus 125 milliLiter(s) IV Bolus once  dextrose 5%. 1000 milliLiter(s) IV Continuous <Continuous>  dextrose 5%. 1000 milliLiter(s) IV Continuous <Continuous>  dextrose 50% Injectable 12.5 Gram(s) IV Push once  dextrose 50% Injectable 25 Gram(s) IV Push once  diphenhydrAMINE Injectable 50 milliGRAM(s) IV Push every 6 hours  ferrous    sulfate 325 milliGRAM(s) Oral two times a day  glucagon  Injectable 1 milliGRAM(s) IntraMuscular once  insulin glargine Injectable (LANTUS) 18 Unit(s) SubCutaneous at bedtime  insulin lispro (ADMELOG) corrective regimen sliding scale   SubCutaneous three times a day before meals  insulin lispro (ADMELOG) corrective regimen sliding scale   SubCutaneous at bedtime  insulin lispro Injectable (ADMELOG) 4 Unit(s) SubCutaneous three times a day before meals  levothyroxine 75 MICROGram(s) Oral daily  multivitamin 1 Tablet(s) Oral daily  mupirocin 2% Nasal 1 Application(s) Both Nostrils two times a day  nystatin    Suspension 220862 Unit(s) Oral four times a day  nystatin/triamcinolone Cream 1 Application(s) Topical every 12 hours  petrolatum white Ointment 1 Application(s) Topical three times a day  senna 2 Tablet(s) Oral at bedtime  triamcinolone 0.1% Ointment 1 Application(s) Topical two times a day    PRN Inpatient Medications  dextrose Oral Gel 15 Gram(s) Oral once PRN  metoclopramide Injectable 10 milliGRAM(s) IV Push every 6 hours PRN  polyethylene glycol 3350 17 Gram(s) Oral two times a day PRN  sodium chloride 0.9% lock flush 10 milliLiter(s) IV Push every 1 hour PRN      REVIEW OF SYSTEMS  --------------------------------------------------------------------------------    Gen: denies  fevers/chills,  CVS: denies chest pain/palpitations  Resp: denies SOB/Cough  GI: Denies N/V/Abd pain  : Denies dysuria    VITALS/PHYSICAL EXAM  --------------------------------------------------------------------------------  T(C): 36.4 (04-21-24 @ 21:03), Max: 36.7 (04-21-24 @ 00:09)  HR: 107 (04-21-24 @ 21:03) (85 - 107)  BP: 97/60 (04-21-24 @ 21:03) (93/58 - 103/67)  RR: 18 (04-21-24 @ 21:03) (18 - 18)  SpO2: 99% (04-21-24 @ 21:03) (98% - 100%)  Wt(kg): --        04-20-24 @ 07:01  -  04-21-24 @ 07:00  --------------------------------------------------------  IN: 1800 mL / OUT: 2200 mL / NET: -400 mL    04-21-24 @ 07:01  -  04-21-24 @ 21:27  --------------------------------------------------------  IN: 600 mL / OUT: 75 mL / NET: 525 mL      Physical Exam:  	      Gen:  on RA, facial erythema, neck and arm erythema,   	Pulm: decrease bs  no rales or ronchi or wheezing  	CV: No JVD. RRR, S1S2; no rub II/VI M   	Abd: +BS, soft, nontender/nondistended, obese   	: No suprapubic tenderness  	UE: Warm, no cyanosis  no clubbing,  no edema  	LE: Warm, no cyanosis  no clubbing, 4+ edema, B/L LE raised red plaque   	Neuro: alert and oriented. speech coherent               Vascular Access: RIJ non tunneled HD catheter    LABS/STUDIES  --------------------------------------------------------------------------------              10.5   10.09 >-----------<  135      [04-21-24 @ 11:38]              34.6     134  |  94  |  56  ----------------------------<  150      [04-21-24 @ 11:38]  4.3   |  26  |  4.02        Ca     8.7     [04-21-24 @ 11:38]      Mg     2.2     [04-21-24 @ 11:38]      Phos  2.5     [04-21-24 @ 11:38]    TPro  6.5  /  Alb  3.2  /  TBili  0.7  /  DBili  x   /  AST  11  /  ALT  7   /  AlkPhos  58  [04-21-24 @ 11:38]          Creatinine Trend:  SCr 4.02 [04-21 @ 11:38]  SCr 4.14 [04-20 @ 08:36]  SCr 4.35 [04-19 @ 06:45]  SCr 4.55 [04-18 @ 16:42]  SCr 4.25 [04-18 @ 09:24]      PTH -- (Ca 8.4)      [04-19-24 @ 17:54]   109  TSH 5.06      [04-14-24 @ 07:18]  Lipid: chol 74, TG 70, HDL 33, LDL --      [04-13-24 @ 07:05]

## 2024-04-21 NOTE — PROGRESS NOTE ADULT - SUBJECTIVE AND OBJECTIVE BOX
MR#[id number]  PATIENT NAME:JOHNATHAN LOPEZ    DATE OF SERVICE: 04-21-24 @ 08:08  Patient was seen and examined by Cuco Tubbs MD on    04-21-24 @ 08:08 .  Interim events noted.Consultant notes ,Labs,Telemetry reviewed by me     Covering for [facility] -Howard Sterling, Carlos Luong, Herman Alston  Office Number: [phone]       HOSPITAL COURSE: HPI:  73F w HFrEF (EF 30%), mod AS, known LBBB, HTN, IDDM1, CKD, hypothyroidism, chronic lymphedema, suspected OHS/LELIA on 3L NC home O2 p/w 1 episode of syncope. Recent admission at [facility] (3/29-4/5) for ADHF and DUNCAN s/p diuresis, discharged with new home O2 3L NC suspecting OHS/LELIA pending outpatient w/u. Since discharge a week ago, she has been staying at home with   Pt had sob walking to car. Once in car, she was still breathing heavily. Partner noticed pt was not responding to verbal stimuli for 10-15sec and witnessed R arm jerking. Pt gained consciousness quickly without postictal symptoms. Pt went to Cardiologist Dr. Nathan who recommended pt to come to ED. No fever, cp, abd pain, n/v. Leg swelling is improved from prior. Pt on torsemide 40mg which she has been taking. Pt was discharged on 3LNC which she is currently on. Patient reports she did not take Farxiga that she was discharged on as she was concerned about side effects.     In ED, patient was found afebrile, hemodynamically stable, breathing well on 3L NC. Initial labs notable for mild hyponatremia, DUNCAN on CKD, elevated proBNP and elevated pCO2 both improved from prior. (12 Apr 2024 16:31)      INTERIM EVENTS:Patient seen at bedside ,interim events noted.Awake no chest pain or dyspnea scheduled for Micra PPM tomorrow prior to TAVR      PMH -reviewed admission note, no change since admission  HEART FAILURE: Acute[x ]Chronic[x ] Systolic[x ] Diastolic[ ] Combined Systolic and Diastolic[ ]  CAD[ ] CABG[ ] PCI[ ]  DEVICES[ ] PPM[ ] ICD[ ] ILR[ ]  ATRIAL FIBRILLATION[ ] Paroxysmal[ ] Permanent[ ] CHADS2-[  ]  DUNCAN[ ] CKD1[ ] CKD2[ ] CKD3[ ] CKD4[ ] ESRD[ ]  COPD[ ] HTN[x ]   DM[ ] Type1[ ] Type 2[ ]   CVA[ ] Paresis[ ]    AMBULATION: Assisted[ ] Cane/walker[x ] Independent[ ]    MEDICATIONS  (STANDING):  ascorbic acid 500 milliGRAM(s) Oral daily  aspirin  chewable 81 milliGRAM(s) Oral daily  atorvastatin 80 milliGRAM(s) Oral at bedtime  calamine/zinc oxide Lotion 1 Application(s) Topical three times a day  cetirizine 10 milliGRAM(s) Oral daily  chlorhexidine 4% Liquid 1 Application(s) Topical <User Schedule>  diphenhydrAMINE Injectable 50 milliGRAM(s) IV Push every 6 hours  ferrous    sulfate 325 milliGRAM(s) Oral two times a day  glucagon  Injectable 1 milliGRAM(s) IntraMuscular once  heparin   Injectable 5000 Unit(s) SubCutaneous every 8 hours  insulin glargine Injectable (LANTUS) 18 Unit(s) SubCutaneous at bedtime  insulin lispro (ADMELOG) corrective regimen sliding scale   SubCutaneous at bedtime  insulin lispro (ADMELOG) corrective regimen sliding scale   SubCutaneous three times a day before meals  insulin lispro Injectable (ADMELOG) 4 Unit(s) SubCutaneous three times a day before meals  levothyroxine 75 MICROGram(s) Oral daily  multivitamin 1 Tablet(s) Oral daily  mupirocin 2% Nasal 1 Application(s) Both Nostrils two times a day  nystatin    Suspension 207770 Unit(s) Oral four times a day  nystatin/triamcinolone Cream 1 Application(s) Topical every 12 hours  petrolatum white Ointment 1 Application(s) Topical three times a day  senna 2 Tablet(s) Oral at bedtime  triamcinolone 0.1% Ointment 1 Application(s) Topical two times a day    MEDICATIONS  (PRN):  dextrose Oral Gel 15 Gram(s) Oral once PRN Blood Glucose LESS THAN 70 milliGRAM(s)/deciliter  metoclopramide Injectable 10 milliGRAM(s) IV Push every 6 hours PRN nausea vomiting  polyethylene glycol 3350 17 Gram(s) Oral two times a day PRN Constipation  sodium chloride 0.9% lock flush 10 milliLiter(s) IV Push every 1 hour PRN Pre/post blood products, medications, blood draw, and to maintain line patency            REVIEW OF SYSTEMS:  Constitutional: [ ] fever, [ ]weight loss,  [ x]fatigue [ ]weight gain  Eyes: [ ] visual changes  Respiratory: [ ]shortness of breath;  [ ] cough, [ ]wheezing, [ ]chills, [ ]hemoptysis  Cardiovascular: [ ] chest pain, [ ]palpitations, [ ]dizziness,  [ ]leg swelling[ ]orthopnea[ ]PND  Gastrointestinal: [ ] abdominal pain, [ ]nausea, [ ]vomiting,  [ ]diarrhea [ ]Constipation [ ]Melena  Genitourinary: [ ] dysuria, [ ] hematuria [ ][place]za  Neurologic: [ ] headaches [ ] tremors[ ]weakness [ ]Paralysis Right[ ] Left[ ]  Skin: [ ] itching, [ ]burning, [ ] rashes  Endocrine: [ ] heat or cold intolerance  Musculoskeletal: [ ] joint pain or swelling; [ ] muscle, back, or extremity pain  Psychiatric: [ ] depression, [ ]anxiety, [ ]mood swings, or [ ]difficulty sleeping  Hematologic: [ ] easy bruising, [ ] bleeding gums    [ ] All remaining systems negative except as per above.   [ ]Unable to obtain.  [x] No change in ROS since admission      Vital Signs Last 24 Hrs  T(C): 36.2 (21 Apr 2024 04:32), Max: 36.7 (21 Apr 2024 00:09)  T(F): 97.2 (21 Apr 2024 04:32), Max: 98 (21 Apr 2024 00:09)  HR: 97 (21 Apr 2024 04:32) (76 - 103)  BP: 103/62 (21 Apr 2024 04:32) (93/54 - 124/66)  RR: 18 (21 Apr 2024 04:32) (18 - 18)  SpO2: 99% (21 Apr 2024 04:32) (96% - 100%)    Parameters below as of 21 Apr 2024 04:32  Patient On (Oxygen Delivery Method): nasal cannula  O2 Flow (L/min): 3    I&O's Summary    20 Apr 2024 07:01  -  21 Apr 2024 07:00  --------------------------------------------------------  IN: 1800 mL / OUT: 2200 mL / NET: -400 mL        PHYSICAL EXAM:  General: No acute distress BMI-  HEENT: EOMI, PERRL  Neck: Supple, [ ] JVD  Lungs: Equal air entry bilaterally; [ ] rales [ ] wheezing [ ] rhonchi  Heart: Regular rate and rhythm; [x ] murmur   3/6 [ x] systolic [ ] diastolic [ x] radiation[ ] rubs [ ]  gallops  Abdomen: Nontender, bowel sounds present  Extremities: No clubbing, cyanosis, [ x] lymphedema [ ]Pulses  equal and intact  Nervous system:  Alert & Oriented X3, no focal deficits  Psychiatric: Normal affect  Skin: No rashes or lesions    LABS:  04-20    131<L>  |  92<L>  |  68<H>  ----------------------------<  262<H>  5.0   |  26  |  4.14<H>    Ca    8.8      20 Apr 2024 08:36  Phos  3.2     04-20  Mg     2.3     04-20    TPro  6.3  /  Alb  3.2<L>  /  TBili  0.9  /  DBili  x   /  AST  13  /  ALT  7<L>  /  AlkPhos  59  04-20    Creatinine Trend: 4.14<--, 4.35<--, 4.55<--, 4.25<--, 3.62<--, 2.81<--                        10.2   8.83  )-----------( 119      ( 20 Apr 2024 08:36 )             34.4        Xray Chest 1 View AP/PA (04.17.24 @ 12:32) >  FINDINGS:    The heart is enlarged.  Mild pulmonary edema. No focal consolidation.  There is no pneumothorax. Trace left pleural effusion.  No acute bony abnormality.    IMPRESSION:  No focal consolidation.         TTE W or WO Ultrasound Enhancing Agent (04.16.24 @ 15:35) >  FINDINGS:     Aortic Valve:  There is severe aortic stenosis. The peak transaortic velocity is 4.29 m/s, peak transaortic gradient is 73.6 mmHg and mean transaortic gradient is 43.0 mmHg with an LVOT/aortic valve VTI ratio of 0.21. The aortic valve area is estimated at 0.93 cm² by the continuity equation. There is trace aortic regurgitation.

## 2024-04-21 NOTE — PROGRESS NOTE ADULT - SUBJECTIVE AND OBJECTIVE BOX
Chief Complaint: Evaluating this 74 y/o F for uncontrolled Type 2 DM w/ hyperglycemia and adrenal adenoma      Interval History: + po intake. Glucose improved. No chest pain or SOB.     MEDICATIONS  (STANDING):  ascorbic acid 500 milliGRAM(s) Oral daily  aspirin  chewable 81 milliGRAM(s) Oral daily  atorvastatin 80 milliGRAM(s) Oral at bedtime  calamine/zinc oxide Lotion 1 Application(s) Topical three times a day  cetirizine 10 milliGRAM(s) Oral daily  chlorhexidine 4% Liquid 1 Application(s) Topical <User Schedule>  dextrose 10% Bolus 125 milliLiter(s) IV Bolus once  dextrose 5%. 1000 milliLiter(s) (100 mL/Hr) IV Continuous <Continuous>  dextrose 5%. 1000 milliLiter(s) (50 mL/Hr) IV Continuous <Continuous>  dextrose 50% Injectable 12.5 Gram(s) IV Push once  dextrose 50% Injectable 25 Gram(s) IV Push once  diphenhydrAMINE Injectable 50 milliGRAM(s) IV Push every 6 hours  ferrous    sulfate 325 milliGRAM(s) Oral two times a day  glucagon  Injectable 1 milliGRAM(s) IntraMuscular once  heparin   Injectable 5000 Unit(s) SubCutaneous every 8 hours  insulin glargine Injectable (LANTUS) 18 Unit(s) SubCutaneous at bedtime  insulin lispro (ADMELOG) corrective regimen sliding scale   SubCutaneous three times a day before meals  insulin lispro (ADMELOG) corrective regimen sliding scale   SubCutaneous at bedtime  insulin lispro Injectable (ADMELOG) 4 Unit(s) SubCutaneous three times a day before meals  levothyroxine 75 MICROGram(s) Oral daily  multivitamin 1 Tablet(s) Oral daily  mupirocin 2% Nasal 1 Application(s) Both Nostrils two times a day  nystatin    Suspension [id number] Unit(s) Oral four times a day  nystatin/triamcinolone Cream 1 Application(s) Topical every 12 hours  petrolatum white Ointment 1 Application(s) Topical three times a day  senna 2 Tablet(s) Oral at bedtime  triamcinolone 0.1% Ointment 1 Application(s) Topical two times a day    MEDICATIONS  (PRN):  dextrose Oral Gel 15 Gram(s) Oral once PRN Blood Glucose LESS THAN 70 milliGRAM(s)/deciliter  metoclopramide Injectable 10 milliGRAM(s) IV Push every 6 hours PRN nausea vomiting  polyethylene glycol 3350 17 Gram(s) Oral two times a day PRN Constipation  sodium chloride 0.9% lock flush 10 milliLiter(s) IV Push every 1 hour PRN Pre/post blood products, medications, blood draw, and to maintain line patency      Allergies    contrast media (iodine-based) (Fever; Vomiting; Flushing; Hypotension; Rash; Stomach Upset)  Ativan (Rash; Urticaria)  penicillins (Hives (Mod to Severe); Anaphylaxis (Mod to Severe); Short breath (Mod to Severe); Angioedema (Mod to Severe))    Intolerances      Review of Systems:  Constitutional: No fever  Eyes: No blurry vision  Cardiovascular: No chest pain  Respiratory: No SOB  GI: No abdominal pain, No nausea, No vomiting  Endocrine: as noted in HPI    All other negative      PHYSICAL EXAM:  VITALS: T(C): 36.2 (04-21-24 @ 13:58)  T(F): 97.2 (04-21-24 @ 13:58), Max: 98 (04-21-24 @ 00:09)  HR: 96 (04-21-24 @ 13:58) (76 - 97)  BP: 103/67 (04-21-24 @ 13:58) (93/54 - 124/66)  RR:  (18 - 18)  SpO2:  (97% - 100%)  Wt(kg): --  GENERAL: NAD at this time, obese  EYES: EOMI, No proptosis  HEENT:  Atraumatic, Normocephalic,   RESPIRATORY: Clear to auscultation bilaterally, full excursion, non labored  CARDIOVASCULAR: Regular rhythm; normal S1/S2, no peripheral edema  GI: Soft, nontender, non distended, normal bowel sounds  SKIN: Warm and dry  PSYCH: normal affect, normal mood      POCT Blood Glucose.: 130 mg/dL (04-21-24 @ 12:27)  POCT Blood Glucose.: 177 mg/dL (04-21-24 @ 08:35)  POCT Blood Glucose.: 135 mg/dL (04-20-24 @ 21:30)  POCT Blood Glucose.: 169 mg/dL (04-20-24 @ 17:12)  POCT Blood Glucose.: 219 mg/dL (04-20-24 @ 13:27)  POCT Blood Glucose.: 239 mg/dL (04-20-24 @ 12:14)  POCT Blood Glucose.: 301 mg/dL (04-20-24 @ 08:02)  POCT Blood Glucose.: 168 mg/dL (04-19-24 @ 21:25)  POCT Blood Glucose.: 201 mg/dL (04-19-24 @ 18:38)  POCT Blood Glucose.: 232 mg/dL (04-19-24 @ 12:34)  POCT Blood Glucose.: 321 mg/dL (04-19-24 @ 09:02)  POCT Blood Glucose.: 220 mg/dL (04-18-24 @ 21:44)  POCT Blood Glucose.: 243 mg/dL (04-18-24 @ 17:29)        04-21    134<L>  |  94<L>  |  56<H>  ----------------------------<  150<H>  4.3   |  26  |  4.02<H>    eGFR: 11<L>    Ca    8.7      04-21  Mg     2.2     04-21  Phos  2.5     04-21    TPro  6.5  /  Alb  3.2<L>  /  TBili  0.7  /  DBili  x   /  AST  11  /  ALT  7<L>  /  AlkPhos  58  04-21        Thyroid Function Tests:  04-14 @ 07:18 TSH 5.06 FreeT4 1.3 T3 -- Anti TPO -- Anti Thyroglobulin Ab -- TSI --  04-13 @ 07:05 TSH 4.79 FreeT4 1.5 T3 -- Anti TPO -- Anti Thyroglobulin Ab -- TSI --

## 2024-04-22 NOTE — PROGRESS NOTE ADULT - ATTENDING COMMENTS
Agree with Dr. Astudillo's assessment and plan as outlined above. Reviewed all pertinent labs, and imaging studies. Modifications made as indicated above. Following this patient for adrenal adenoma and cushing evaluation. Did not suppress with 1 mg DST, cortisol was >1.8 pending dex level. 24 hour urine cortisol and midnight salivary cortisol pending. Plasma metanephrine also pending. Check c peptide in the am. Rest of the plan as above.

## 2024-04-22 NOTE — PROGRESS NOTE ADULT - SUBJECTIVE AND OBJECTIVE BOX
Admitted for: Syncope and collapse        Following for: L adrenal nodule    Subjective:   No acute complaints. Patient is awaiting PPM.      MEDICATIONS  (STANDING):  ascorbic acid 500 milliGRAM(s) Oral daily  atorvastatin 80 milliGRAM(s) Oral at bedtime  calamine/zinc oxide Lotion 1 Application(s) Topical three times a day  cetirizine 10 milliGRAM(s) Oral daily  chlorhexidine 4% Liquid 1 Application(s) Topical <User Schedule>  dextrose 10% Bolus 125 milliLiter(s) IV Bolus once  dextrose 5%. 1000 milliLiter(s) (100 mL/Hr) IV Continuous <Continuous>  dextrose 5%. 1000 milliLiter(s) (50 mL/Hr) IV Continuous <Continuous>  dextrose 50% Injectable 12.5 Gram(s) IV Push once  dextrose 50% Injectable 25 Gram(s) IV Push once  diphenhydrAMINE Injectable 50 milliGRAM(s) IV Push every 6 hours  ferrous    sulfate 325 milliGRAM(s) Oral two times a day  glucagon  Injectable 1 milliGRAM(s) IntraMuscular once  insulin glargine Injectable (LANTUS) 18 Unit(s) SubCutaneous at bedtime  insulin lispro (ADMELOG) corrective regimen sliding scale   SubCutaneous three times a day before meals  insulin lispro (ADMELOG) corrective regimen sliding scale   SubCutaneous at bedtime  insulin lispro Injectable (ADMELOG) 4 Unit(s) SubCutaneous three times a day before meals  levothyroxine 75 MICROGram(s) Oral daily  multivitamin 1 Tablet(s) Oral daily  nystatin    Suspension 018636 Unit(s) Oral four times a day  nystatin/triamcinolone Cream 1 Application(s) Topical every 12 hours  petrolatum white Ointment 1 Application(s) Topical three times a day  senna 2 Tablet(s) Oral at bedtime  triamcinolone 0.1% Ointment 1 Application(s) Topical two times a day    MEDICATIONS  (PRN):  dextrose Oral Gel 15 Gram(s) Oral once PRN Blood Glucose LESS THAN 70 milliGRAM(s)/deciliter  metoclopramide Injectable 10 milliGRAM(s) IV Push every 6 hours PRN nausea vomiting  polyethylene glycol 3350 17 Gram(s) Oral two times a day PRN Constipation  sodium chloride 0.9% lock flush 10 milliLiter(s) IV Push every 1 hour PRN Pre/post blood products, medications, blood draw, and to maintain line patency      Allergies    contrast media (iodine-based) (Fever; Vomiting; Flushing; Hypotension; Rash; Stomach Upset)  Ativan (Rash; Urticaria)  penicillins (Hives (Mod to Severe); Anaphylaxis (Mod to Severe); Short breath (Mod to Severe); Angioedema (Mod to Severe))    Intolerances          PHYSICAL EXAM:  VITALS: T(C): 36.7 (04-22-24 @ 12:31)  T(F): 98.1 (04-22-24 @ 12:31), Max: 98.5 (04-22-24 @ 04:00)  HR: 108 (04-22-24 @ 12:31) (92 - 109)  BP: 121/71 (04-22-24 @ 12:31) (97/60 - 121/71)  RR:  (18 - 19)  SpO2:  (95% - 99%)  Wt(kg): --  GENERAL: NAD, obese  EYES: No proptosis, no lid lag, anicteric  RESPIRATORY: No respiratory distress  CARDIOVASCULAR: +BLE edema  GI: non distended  SKIN: dry, mild erythema  EXT: BLE bandaged  PSYCH: Alert and oriented x 3, reactive affect      A1C with Estimated Average Glucose Result: 7.4 % (03-30-24 @ 08:21)  A1C with Estimated Average Glucose Result: 6.8 % (01-10-24 @ 08:37)  A1C with Estimated Average Glucose Result: 7.3 % (08-12-23 @ 07:58)  A1C with Estimated Average Glucose Result: 8.2 % (06-05-23 @ 06:10)      POCT Blood Glucose.: 171 mg/dL (04-22-24 @ 12:28)  POCT Blood Glucose.: 175 mg/dL (04-22-24 @ 08:33)  POCT Blood Glucose.: 161 mg/dL (04-21-24 @ 21:50)  POCT Blood Glucose.: 158 mg/dL (04-21-24 @ 17:17)  POCT Blood Glucose.: 130 mg/dL (04-21-24 @ 12:27)  POCT Blood Glucose.: 177 mg/dL (04-21-24 @ 08:35)  POCT Blood Glucose.: 135 mg/dL (04-20-24 @ 21:30)  POCT Blood Glucose.: 169 mg/dL (04-20-24 @ 17:12)  POCT Blood Glucose.: 219 mg/dL (04-20-24 @ 13:27)  POCT Blood Glucose.: 239 mg/dL (04-20-24 @ 12:14)  POCT Blood Glucose.: 301 mg/dL (04-20-24 @ 08:02)  POCT Blood Glucose.: 168 mg/dL (04-19-24 @ 21:25)  POCT Blood Glucose.: 201 mg/dL (04-19-24 @ 18:38)      04-22    131<L>  |  94<L>  |  63<H>  ----------------------------<  155<H>  5.0   |  23  |  4.75<H>    eGFR: 9<L>    Ca    8.5      04-22  Mg     2.2     04-22  Phos  3.1     04-22    TPro  6.2  /  Alb  2.9<L>  /  TBili  0.7  /  DBili  x   /  AST  16  /  ALT  5<L>  /  AlkPhos  65  04-22      Thyroid Function Tests:  04-14 @ 07:18 TSH 5.06 FreeT4 1.3 T3 -- Anti TPO -- Anti Thyroglobulin Ab -- TSI --  04-13 @ 07:05 TSH 4.79 FreeT4 1.5 T3 -- Anti TPO -- Anti Thyroglobulin Ab -- TSI --

## 2024-04-22 NOTE — PROGRESS NOTE ADULT - ASSESSMENT
73F w HFrEF (EF 30%), mod AS, known LBBB, HTN, T1DM, CKD, hypothyroidism, chronic lymphedema, suspected OHS/LELIA on 3L NC home O2 p/w 1 episode of syncope with R arm jerking, likely 2/2 severe symptomatic AS. CTA imagings performed for TAVR eval, c/b DUNCAN on CKD. C/b febrile and hypotension, sepsis workup pending. CT c/a/p w/w/o IV contrast revealed incidental finding of L adrenal nodule.    #L adrenal nodule   Incidentally found on CT c/a/p w/wo IV contrast done for TAVR evaluation. The left adrenal gland is thickened and a 1.2 x 0.8 cm left adrenal nodule is seen. The right adrenal gland is normal.  Primary team discussed with radiology. HU of the adrenal nodule not able to be accurately determined due to motion artifact, also given imaging was taken at venous phase.     Hx HTN, T1DM, obesity difficulty losing weight.    Recommendations:  - Test for excess adrenal hormones:  = follow up plasma metanephrines  = serum aldosterone is elevated to 37.4. Follow up plasma renin activity.  = Dex suppression with am cortisol 4.1 not suppressed with ACTH 9.5. Will await dex level to assess for appropriate suppression. Follow up midnight salivary cortisol level. Check 24 hour urine cortisol and creatinine as confirmatory testing to r/o Cushings'   = DHEAS 139 wnl. Follow up androstenedione   - Nonurgent CT non-contrast adrenal protocol to determine hounsfield units of adrenal nodule. (Of note, CT features suggesting malignancy include HU >10, mass size >4cm, and >50% contrast retention seen 10 minutes after contrast administration (contrast retention is of low specificity/sensitivity).)  - Will need follow up outpatient with Dr. Luis Dumont      #T1DM  Has hx of T1DM since age 26  Endocrinologist: Dr. Luis Dumont  Home regimen: Lantus 33 units qhs, Novolog based on sliding scale TIDAC normally 3-5 units  Has Dexcom G7  A1c is 7.4%  - Inpatient BG goal 100-180  - Monitor on Lantus 18 units QHS (Can give 15 units if NPO).   - Monitor on admelog 4 units TIDAC; HOLD if NPO or not eating  - Low dose admelog correction scale TIDAC  - Low dose admelog correction scale QHS  - on CC diet with evening snack  - Of note, patient instructed on discharge from recent hospitalization at [facility] to start farxiga for CHF. Given risks of DKA of SGLT2i in T1DM, would not start until better data is available for its benefits.  - Discharge regimen: home basal/bolus insulin, dose TBD.  - Follow up with Dr. Luis Dumont outpatient    #Hx of Hypothyroidism  TSH slightly elevated to 4.79-5.06 with normal FT4 1.3-1.5  - continue home LT4 75mcg daily  - repeat TFT's outpatient when clinically stabilized    Calvin Astudillo MD  Endocrine Fellow  Can be reached via teams. For follow up questions, discharge recommendations, or new consults, please call answering service at [phone] (weekdays); [phone] (nights/weekends). 73F w HFrEF (EF 30%), mod AS, known LBBB, HTN, T1DM, CKD, hypothyroidism, chronic lymphedema, suspected OHS/LELIA on 3L NC home O2 p/w 1 episode of syncope with R arm jerking, likely 2/2 severe symptomatic AS. CTA imagings performed for TAVR eval, c/b DUNCAN on CKD. C/b febrile and hypotension, sepsis workup pending. CT c/a/p w/w/o IV contrast revealed incidental finding of L adrenal nodule.    #L adrenal nodule   Incidentally found on CT c/a/p w/wo IV contrast done for TAVR evaluation. The left adrenal gland is thickened and a 1.2 x 0.8 cm left adrenal nodule is seen. The right adrenal gland is normal.  Primary team discussed with radiology. HU of the adrenal nodule not able to be accurately determined due to motion artifact, also given imaging was taken at venous phase.     Hx HTN, T1DM, obesity difficulty losing weight.    Recommendations:  - Test for excess adrenal hormones:  = follow up plasma metanephrines  = serum aldosterone is elevated to 37.4. Follow up plasma renin activity.  = Dex suppression with am cortisol 4.1 not suppressed with ACTH 9.5. Will await dex level to assess for appropriate suppression. Follow up midnight salivary cortisol level. Check 24 hour urine cortisol and creatinine as confirmatory testing to r/o Cushings'   = DHEAS 139 wnl. Follow up androstenedione   - Nonurgent CT non-contrast adrenal protocol to determine hounsfield units of adrenal nodule. (Of note, CT features suggesting malignancy include HU >10, mass size >4cm, and >50% contrast retention seen 10 minutes after contrast administration (contrast retention is of low specificity/sensitivity).)  - Will need follow up outpatient with Dr. Luis Dumont      #T1DM  Has hx of T1DM since age 26  Endocrinologist: Dr. Luis Dumont  Home regimen: Lantus 33 units qhs, Novolog based on sliding scale TIDAC normally 3-5 units  Has Dexcom G7  A1c is 7.4%  - Inpatient BG goal 100-180  - Monitor on Lantus 18 units QHS (Can give 15 units if NPO).   - Monitor on admelog 4 units TIDAC; HOLD if NPO or not eating  - Low dose admelog correction scale TIDAC  - Low dose admelog correction scale QHS  - CC diet with evening snack  - Check C-peptide to confirm insulin deficiency/T1DM   - Of note, patient instructed on discharge from recent hospitalization at [place] to start farxiga for CHF. Given risks of DKA of SGLT2i in T1DM, would not start until better data is available for its benefits.  - Discharge regimen: home basal/bolus insulin, dose TBD.  - Follow up with Dr. Luis Dumont outpatient    #Hx of Hypothyroidism  TSH slightly elevated to 4.79-5.06 with normal FT4 1.3-1.5  - continue home LT4 75mcg daily  - repeat TFT's outpatient when clinically stabilized    Calvin Astudillo MD  Endocrine Fellow  Can be reached via teams. For follow up questions, discharge recommendations, or new consults, please call answering service at [phone] (weekdays); [phone] (nights/weekends).

## 2024-04-22 NOTE — PROGRESS NOTE ADULT - ASSESSMENT
73F w HFrEF (EF 30%), mod AS, known LBBB, HTN, IDDM1, CKD, hypothyroidism, chronic lymphedema, suspected OHS/LELIA on 3L NC home O2 p/w 1 episode of syncope. Recent admission at [facility] (3/29-4/5) for ADHF and DUNCAN s/p diuresis, discharged with new home O2 3L NC suspecting OHS/LELIA pending outpatient w/u. Since discharge a week ago, she has been staying at home with   Pt had sob walking to car. Once in car, she was still breathing heavily. Partner noticed pt was not responding to verbal stimuli for 10-15sec and witnessed R arm jerking. Pt gained consciousness quickly without postictal symptoms. Pt went to Cardiologist Dr. Nathan who recommended pt to come to ED. No fever, cp, abd pain, n/v. Leg swelling is improved from prior. Pt on torsemide 40mg which she has been taking. Pt was discharged on 3LNC which she is currently on. Patient reports she did not take Farxiga that she was discharged on as she was concerned about side effects.     In ED, patient was found afebrile, hemodynamically stable, breathing well on 3L NC. Initial labs notable for mild hyponatremia, DUNCAN on CKD, elevated proBNP and elevated pCO2 both improved from prior.  pt also noticed with abn creatinine      1- CKD IV  with DUNCAN   2- CHF   3- lymphedema   4- severe AS   5- syncope         suspect ATN from hypotension along with contrast nephropathy   creatinine worsening requiring renal replacement therapy, HD initiated 4/18  plan for HD   dialyzer F 160  180 min 2 k bath bfr 300 dfr 500 1.2  liter fluid removal   next hd in am   k in range  ID following, no abx at this time  EP consulted for syncope, recommending device placement for bradycardia  structural heart team following, TAVR work up   derm following for rash  cont O2 and tele monitor   trend creatinine and electrolytes daily

## 2024-04-22 NOTE — PROGRESS NOTE ADULT - SUBJECTIVE AND OBJECTIVE BOX
[facility] - Howard Kimble, Carlos Luong, Herman Alston  Office Number:  [phone]    Patient resting comfortably in bed in NAD.  Laying flat with no respiratory distress.  No complaints of chest pain, dyspnea, palpitations, PND, or orthopnea.  Now on HD    ROS: negative unless otherwise mentioned.    Telemetry: SR first degree, ST    MEDICATIONS  (STANDING):  ascorbic acid 500 milliGRAM(s) Oral daily  aspirin  chewable 81 milliGRAM(s) Oral daily  atorvastatin 80 milliGRAM(s) Oral at bedtime  calamine/zinc oxide Lotion 1 Application(s) Topical three times a day  cetirizine 10 milliGRAM(s) Oral daily  chlorhexidine 4% Liquid 1 Application(s) Topical <User Schedule>  dextrose 10% Bolus 125 milliLiter(s) IV Bolus once  dextrose 5%. 1000 milliLiter(s) (100 mL/Hr) IV Continuous <Continuous>  dextrose 5%. 1000 milliLiter(s) (50 mL/Hr) IV Continuous <Continuous>  dextrose 50% Injectable 12.5 Gram(s) IV Push once  dextrose 50% Injectable 25 Gram(s) IV Push once  diphenhydrAMINE Injectable 50 milliGRAM(s) IV Push every 6 hours  ferrous    sulfate 325 milliGRAM(s) Oral two times a day  glucagon  Injectable 1 milliGRAM(s) IntraMuscular once  insulin glargine Injectable (LANTUS) 18 Unit(s) SubCutaneous at bedtime  insulin lispro (ADMELOG) corrective regimen sliding scale   SubCutaneous three times a day before meals  insulin lispro (ADMELOG) corrective regimen sliding scale   SubCutaneous at bedtime  insulin lispro Injectable (ADMELOG) 4 Unit(s) SubCutaneous three times a day before meals  levothyroxine 75 MICROGram(s) Oral daily  multivitamin 1 Tablet(s) Oral daily  nystatin    Suspension 697217 Unit(s) Oral four times a day  nystatin/triamcinolone Cream 1 Application(s) Topical every 12 hours  petrolatum white Ointment 1 Application(s) Topical three times a day  senna 2 Tablet(s) Oral at bedtime  triamcinolone 0.1% Ointment 1 Application(s) Topical two times a day    MEDICATIONS  (PRN):  dextrose Oral Gel 15 Gram(s) Oral once PRN Blood Glucose LESS THAN 70 milliGRAM(s)/deciliter  metoclopramide Injectable 10 milliGRAM(s) IV Push every 6 hours PRN nausea vomiting  polyethylene glycol 3350 17 Gram(s) Oral two times a day PRN Constipation  sodium chloride 0.9% lock flush 10 milliLiter(s) IV Push every 1 hour PRN Pre/post blood products, medications, blood draw, and to maintain line patency      Allergies    contrast media (iodine-based) (Fever; Vomiting; Flushing; Hypotension; Rash; Stomach Upset)  Ativan (Rash; Urticaria)  penicillins (Hives (Mod to Severe); Anaphylaxis (Mod to Severe); Short breath (Mod to Severe); Angioedema (Mod to Severe))    Intolerances        Vital Signs Last 24 Hrs  T(C): 36.8 (22 Apr 2024 08:27), Max: 36.9 (22 Apr 2024 00:00)  T(F): 98.3 (22 Apr 2024 08:27), Max: 98.5 (22 Apr 2024 04:00)  HR: 109 (22 Apr 2024 08:27) (92 - 109)  BP: 104/62 (22 Apr 2024 08:27) (97/60 - 115/74)  BP(mean): --  RR: 19 (22 Apr 2024 08:27) (18 - 19)  SpO2: 96% (22 Apr 2024 08:27) (95% - 99%)    Parameters below as of 22 Apr 2024 08:27  Patient On (Oxygen Delivery Method): nasal cannula        I&O's Summary    21 Apr 2024 07:01  -  22 Apr 2024 07:00  --------------------------------------------------------  IN: 950 mL / OUT: 265 mL / NET: 685 mL        ON EXAM:    General: NAD, awake and alert, oriented x 3  HEENT: Mucous membranes are moist, anicteric  Lungs: Non-labored, breath sounds are clear bilaterally, No wheezing, rales or rhonchi  Cardiovascular: Regular, S1 and S2, +SM  Gastrointestinal: Bowel Sounds present, soft, nontender.   Lymph: legs wrapped  Skin: No rashes or ulcers  Psych:  Mood & affect appropriate    LABS: All Labs Reviewed:                        10.4   10.36 )-----------( 151      ( 22 Apr 2024 05:02 )             33.9                         10.5   10.09 )-----------( 135      ( 21 Apr 2024 11:38 )             34.6                         10.2   8.83  )-----------( 119      ( 20 Apr 2024 08:36 )             34.4     22 Apr 2024 05:02    131    |  94     |  63     ----------------------------<  155    5.0     |  23     |  4.75   21 Apr 2024 11:38    134    |  94     |  56     ----------------------------<  150    4.3     |  26     |  4.02   20 Apr 2024 08:36    131    |  92     |  68     ----------------------------<  262    5.0     |  26     |  4.14     Ca    8.5        22 Apr 2024 05:02  Ca    8.7        21 Apr 2024 11:38  Ca    8.8        20 Apr 2024 08:36  Phos  3.1       22 Apr 2024 05:02  Phos  2.5       21 Apr 2024 11:38  Phos  3.2       20 Apr 2024 08:36  Mg     2.2       22 Apr 2024 05:02  Mg     2.2       21 Apr 2024 11:38  Mg     2.3       20 Apr 2024 08:36    TPro  6.2    /  Alb  2.9    /  TBili  0.7    /  DBili  x      /  AST  16     /  ALT  5      /  AlkPhos  65     22 Apr 2024 05:02  TPro  6.5    /  Alb  3.2    /  TBili  0.7    /  DBili  x      /  AST  11     /  ALT  7      /  AlkPhos  58     21 Apr 2024 11:38  TPro  6.3    /  Alb  3.2    /  TBili  0.9    /  DBili  x      /  AST  13     /  ALT  7      /  AlkPhos  59     20 Apr 2024 08:36    PT/INR - ( 22 Apr 2024 05:02 )   PT: 12.4 sec;   INR: 1.13 ratio         PTT - ( 22 Apr 2024 05:02 )  PTT:26.0 sec      Blood Culture:

## 2024-04-22 NOTE — PROGRESS NOTE ADULT - NUTRITIONAL ASSESSMENT
This patient has been assessed with a concern for Malnutrition and has been determined to have a diagnosis/diagnoses of Morbid obesity (BMI > 40).    This patient is being managed with:   Diet NPO after Midnight-     NPO Start Date: 21-Apr-2024   NPO Start Time: 23:59  Except Medications  Entered: Apr 21 2024 10:36AM    Diet NPO-  NPO for Procedure/Test     NPO Start Date: 21-Apr-2024   NPO Start Time: 23:59  Except Medications  Entered: Apr 19 2024 11:14AM    Diet Regular-  Consistent Carbohydrate {Evening Snack} (CSTCHOSN)  DASH/TLC {Sodium & Cholesterol Restricted} (DASH)  1500mL Fluid Restriction ([id number])  Entered: Apr 19 2024  7:44AM

## 2024-04-22 NOTE — PROGRESS NOTE ADULT - PROBLEM SELECTOR PLAN 10
Hospital Bundle    Fluids: FLUID RESTRICT 1.5L/day, NPO for procedure  Electrolytes: Replete K > 4, Mg > 2, Phos > 3  Nutrition: Diet: CC   PPX  ---VTE: HSQ, held for procedure  ---Resp: c/w home O2  Code Status: full code  Dispo: pending clinical improvement

## 2024-04-22 NOTE — PROGRESS NOTE ADULT - ASSESSMENT
73F w HFrEF (EF 30%), mod AS, known LBBB, HTN, IDDM1, CKD, hypothyroidism, chronic lymphedema, p/w 1 episode of syncope with R arm jerking.     #Syncope-Aortic Stenosis  - Episode after exertion  - Known Aortic Stenosis likely Severe in setting of decreased LVEF  - TAVR likely late next week     #Chronic Systolic HF (EF 30%), mod to severe AS, HTN, LBBB, Lymphedema   - Has known systolic HF and AS.    - Repeat TTE showed EF 30%, mild-mod LVH, mildly reduced RVF, mod-severe AS (.61 cmsq), mod pHTN  - Had cath 2022-Non obstructive CAD  - On home Torsemide 20 mg daily, Eplerenone 25 mg daily, and Toprol  mg daily  - Compliant with all meds including Torsemide, though, dose was reduced to Furosemide 40mg once daily.   - proBNP 51K from 80k.   - Consideration for TAVR  - Was on bumex gtt but then overnight 4/15 developed a fever to 102F and became hypotensive overnight. Bumex gtt stopped, given 250cc bolus. BPs remain low. UOP has dropped. I suspect she is intravascularly depleted.   - As per ID team, fever thought to be 2/2 allergic rxn to contrast, rec to hold abx  - Now on HD as noted below    # Acute kidney injury superimposed on CKD.   - now with worsening Cr  - HD initiated-tolerating. Next HD is today  - vol removal with HD     #LBBB  Known LBBB  Noted intermittent Wenkebach on telemetry with bradycardia and 2:1 AV conduction  - EP has been consulted.   - Plan for AV micra today with possible upgrade to CRT-D in the future  - To resume BB for GDMT once PPM is placed    Will continue to follow closely.

## 2024-04-22 NOTE — PROGRESS NOTE ADULT - ASSESSMENT
73F w HFrEF (EF 30%), mod AS, known LBBB, HTN, IDDM1, CKD, hypothyroidism, chronic lymphedema, p/w 1 episode of syncope with R arm jerking, likely 2/2 severe symptomatic AS. CTA imagings performed for TAVR eval. C/b febrile episode with hypotension, likely allergic contrast reaction vs infectious, pending workup. C/b contrast-related renal failure requiring temporary HD. Plan for AV Micra 4/22 prior to TAVR.

## 2024-04-22 NOTE — PROGRESS NOTE ADULT - PROBLEM SELECTOR PLAN 1
syncope 2/2 mod to severe AS     Plan:  -structural cards following - CTA imagings performed for TAVR  -Plan for AV micra 4/22 prior to TAVR per EP due to conduction disease  -CTM on telemetry

## 2024-04-22 NOTE — PROGRESS NOTE ADULT - ASSESSMENT
· Assessment    72yo woman with PMHx of HFrEF (EF 30%), mod AS, known 1st degree AVB and LBBB, HTN, IDDM1, CKD, hypothyroidism, chronic lymphedema, suspected OHS/LELIA on 3L NC home O2 p/w 1 episode of syncope. Recent admission at [facility] (3/29-4/5) for ADHF and DUNCAN s/p diuresis.   Pt states she does not remember her syncopal episode. She remembers walking to the car and getting into it. She denies any preceding symptoms. She was told that she "blacked out" and was not responding for 10-15 seconds with arm shaking. There is documentation that pt was dyspneic upon getting into car after walking but pt did not recall that on EP consultation 4/15.  Pt went to her cardiologist Dr. Nathan who recommended pt to come to ED.    EKG consistent with sinus bradycardia at 58bpm, 1st degree AVB (Sharath 318ms) with LBBB (QRSd 176). Early AM 4/14, she was noted to have 2:1 AV block. Pt also with e/o Wenckebach several times throughout the day on 4/14. She is being worked up for her aortic stenosis, likely severe in setting of decreased LVEF. She has significant LE edema and history of lymphedema with rash on lower legs. She was on Toprol XL 100mg at home but has not taken in since 4/12.    1) Syncope  2) 2:1 AV block with known 1st degree AVB (Sharath 318ms) with LBBB (QRSd 176)  3) HFrEF with LVEF 30%  4) Aortic stenosis, likely severe in setting of decreased LVEF  5) Lymphedema with significant LE edema and rash  6) Fever 4/16, undergoing infectious workup    Recommendations:  - Likely undergoing TAVR next week per structural heart team  - Plan for leadless PPM implant today 4/22  - Keep NPO prior to procedure  - Active T&S x2, coags prior to PPM  - Hold morning dose of SQ heparin on day of procedure  - ID following for potential infectious etiology of fever. Likely allergic reaction to contrast. Off abx. No further fevers noted  - BB on hold  -  Pt may warrant CRT-D placement noting conduction disease and cardiomyopathy with LVEF 30% and LBBB with QRSd 176. However, pt is not a candidate for CRT at this time noting infectious risk due to LE swelling/cellulitis risks in the setting of likely ESRD. Only safe option would be AV Micra as a temporizing measure  -If pt is able to eventually undergo Micra AV placement and then TAVR, she could be reassessed down the line for the need of transvenous device with conduction sytem or CRT pacing if needed     · Assessment    74yo woman with PMHx of HFrEF (EF 30%), mod AS, known 1st degree AVB and LBBB, HTN, IDDM1, CKD, hypothyroidism, chronic lymphedema, suspected OHS/LELIA on 3L NC home O2 p/w 1 episode of syncope. Recent admission at [facility] (3/29-4/5) for ADHF and DUNCAN s/p diuresis.   Pt states she does not remember her syncopal episode. She remembers walking to the car and getting into it. She denies any preceding symptoms. She was told that she "blacked out" and was not responding for 10-15 seconds with arm shaking. There is documentation that pt was dyspneic upon getting into car after walking but pt did not recall that on EP consultation 4/15.  Pt went to her cardiologist Dr. Nathan who recommended pt to come to ED.    EKG consistent with sinus bradycardia at 58bpm, 1st degree AVB (Sharath 318ms) with LBBB (QRSd 176). Early AM 4/14, she was noted to have 2:1 AV block. Pt also with e/o Wenckebach several times throughout the day on 4/14. She is being worked up for her aortic stenosis, likely severe in setting of decreased LVEF. She has significant LE edema and history of lymphedema with rash on lower legs. She was on Toprol XL 100mg at home but has not taken in since 4/12.    1) Syncope  2) 2:1 AV block with known 1st degree AVB (Sharath 318ms) with LBBB (QRSd 176)  3) HFrEF with LVEF 30%  4) Aortic stenosis, likely severe in setting of decreased LVEF  5) Lymphedema with significant LE edema and rash  6) Fever 4/16 - now resolved    Recommendations:  - Likely undergoing TAVR next week per structural heart team  - Plan for leadless PPM implant today 4/22  - Keep NPO prior to procedure  - Active T&S x2, coags prior to PPM  - Hold morning dose of SQ heparin on day of procedure  - ID following for potential infectious etiology of fever. Likely allergic reaction to contrast. Off abx. No further fevers noted  - BB on hold  -  Pt may warrant CRT-D placement noting conduction disease and cardiomyopathy with LVEF 30% and LBBB with QRSd 176. However, pt is not a candidate for CRT at this time noting infectious risk due to LE swelling/cellulitis risks in the setting of likely ESRD. Only safe option would be AV Micra as a temporizing measure  -If pt is able to eventually undergo Micra AV placement and then TAVR, she could be reassessed down the line for the need of transvenous device with conduction sytem or CRT pacing if needed     74yo woman with PMHx of HFrEF (EF 30%), mod AS, known 1st degree AVB and LBBB, HTN, IDDM1, CKD, hypothyroidism, chronic lymphedema, suspected OHS/LELIA on 3L [place] home O2 p/w 1 episode of syncope. Recent admission at [facility] (3/29-4/5) for ADHF and DUNCAN s/p diuresis.   Pt states she does not remember her syncopal episode. She remembers walking to the car and getting into it. She denies any preceding symptoms. She was told that she "blacked out" and was not responding for 10-15 seconds with arm shaking. There is documentation that pt was dyspneic upon getting into car after walking but pt did not recall that on EP consultation 4/15.  Pt went to her cardiologist Dr. Nathan who recommended pt to come to ED.    EKG consistent with sinus bradycardia at 58bpm, 1st degree AVB (Sharath 318ms) with LBBB (QRSd 176). Early AM 4/14, she was noted to have 2:1 AV block. Pt also with e/o Wenckebach several times throughout the day on 4/14. She is being worked up for her aortic stenosis, likely severe in setting of decreased LVEF. She has significant LE edema and history of lymphedema with rash on lower legs. She was on Toprol XL 100mg at home but has not taken in since 4/12.    1) Syncope  2) 2:1 AV block with known 1st degree AVB (Sharath 318ms) with LBBB (QRSd 176)  3) HFrEF with LVEF 30%  4) Aortic stenosis, likely severe in setting of decreased LVEF  5) Lymphedema with significant LE edema and rash  6) Fever 4/16 - now resolved    Recommendations:  - Likely undergoing TAVR next week per structural heart team  - Plan for leadless PPM implant today 4/22. Dialysis prior is ideal  - Keep NPO prior to procedure  - Active T&S x2, coags prior to PPM  - Hold morning dose of SQ heparin on day of procedure  - ID following for potential infectious etiology of fever. Likely allergic reaction to contrast. Off abx. No further fevers noted  - BB on hold  -  Pt may warrant CRT-D placement noting conduction disease and cardiomyopathy with LVEF 30% and LBBB with QRSd 176. However, pt is not a candidate for CRT at this time noting infectious risk due to LE swelling/cellulitis risks in the setting of likely ESRD. Only safe option would be AV Micra as a temporizing measure  - If pt is able to eventually undergo Micra AV placement and then TAVR, she could be reassessed down the line for the need of transvenous device with conduction sytem or CRT pacing if needed    Plan discussed with Dr. Lopez 74yo woman with PMHx of HFrEF (EF 30%), mod AS, known 1st degree AVB and LBBB, HTN, IDDM1, CKD, hypothyroidism, chronic lymphedema, suspected OHS/LELIA on 3L [place] home O2 p/w 1 episode of syncope. Recent admission at [facility] (3/29-4/5) for ADHF and DUNCAN s/p diuresis.   Pt states she does not remember her syncopal episode. She remembers walking to the car and getting into it. She denies any preceding symptoms. She was told that she "blacked out" and was not responding for 10-15 seconds with arm shaking. There is documentation that pt was dyspneic upon getting into car after walking but pt did not recall that on EP consultation 4/15.  Pt went to her cardiologist Dr. Nathan who recommended pt to come to ED.    EKG consistent with sinus bradycardia at 58bpm, 1st degree AVB (Sharath 318ms) with LBBB (QRSd 176). Early AM 4/14, she was noted to have 2:1 AV block. Pt also with e/o Wenckebach several times throughout the day on 4/14. She is being worked up for her aortic stenosis, likely severe in setting of decreased LVEF. She has significant LE edema and history of lymphedema with rash on lower legs. She was on Toprol XL 100mg at home but has not taken in since 4/12.    1) Syncope  2) 2:1 AV block with known 1st degree AVB (Sharath 318ms) with LBBB (QRSd 176)  3) HFrEF with LVEF 30%  4) Aortic stenosis, likely severe in setting of decreased LVEF  5) Lymphedema with significant LE edema and rash  6) Fever 4/16 - now resolved    Recommendations:  - Likely undergoing TAVR next week per structural heart team  - Plan for leadless PPM implant 4/23  - Keep NPO prior to procedure  - Active T&S x2, coags prior to PPM  - Hold morning dose of SQ heparin on day of procedure  - ID following for potential infectious etiology of fever. Likely allergic reaction to contrast. Off abx. No further fevers noted  - BB on hold  - Pt may warrant CRT-D placement noting conduction disease and cardiomyopathy with LVEF 30% and LBBB with QRSd 176. However, pt is not a candidate for CRT at this time noting infectious risk due to LE swelling/cellulitis risks in the setting of likely ESRD. Only safe option would be AV Micra as a temporizing measure  - If pt is able to eventually undergo Micra AV placement and then TAVR, she could be reassessed down the line for the need of transvenous device with conduction sytem or CRT pacing if needed    Plan discussed with Dr. Lopez

## 2024-04-22 NOTE — PROGRESS NOTE ADULT - SUBJECTIVE AND OBJECTIVE BOX
PROGRESS NOTE:   Authored by Dr. Mireya Hayden MD (PGY-1). Pager [facility] [phone]/ [facility]     Patient is a 73y old  Female who presents with a chief complaint of Syncope (21 Apr 2024 21:26)      SUBJECTIVE / OVERNIGHT EVENTS:  No acute events overnight. Sleeping comofrtably in bed. Denies CP, SOB    All other ROS neg except as above in HPI    MEDICATIONS  (STANDING):  ascorbic acid 500 milliGRAM(s) Oral daily  aspirin  chewable 81 milliGRAM(s) Oral daily  atorvastatin 80 milliGRAM(s) Oral at bedtime  calamine/zinc oxide Lotion 1 Application(s) Topical three times a day  cetirizine 10 milliGRAM(s) Oral daily  chlorhexidine 4% Liquid 1 Application(s) Topical <User Schedule>  dextrose 10% Bolus 125 milliLiter(s) IV Bolus once  dextrose 5%. 1000 milliLiter(s) (100 mL/Hr) IV Continuous <Continuous>  dextrose 5%. 1000 milliLiter(s) (50 mL/Hr) IV Continuous <Continuous>  dextrose 50% Injectable 12.5 Gram(s) IV Push once  dextrose 50% Injectable 25 Gram(s) IV Push once  diphenhydrAMINE Injectable 50 milliGRAM(s) IV Push every 6 hours  ferrous    sulfate 325 milliGRAM(s) Oral two times a day  glucagon  Injectable 1 milliGRAM(s) IntraMuscular once  insulin glargine Injectable (LANTUS) 18 Unit(s) SubCutaneous at bedtime  insulin lispro (ADMELOG) corrective regimen sliding scale   SubCutaneous at bedtime  insulin lispro (ADMELOG) corrective regimen sliding scale   SubCutaneous three times a day before meals  insulin lispro Injectable (ADMELOG) 4 Unit(s) SubCutaneous three times a day before meals  levothyroxine 75 MICROGram(s) Oral daily  multivitamin 1 Tablet(s) Oral daily  nystatin    Suspension 407253 Unit(s) Oral four times a day  nystatin/triamcinolone Cream 1 Application(s) Topical every 12 hours  petrolatum white Ointment 1 Application(s) Topical three times a day  senna 2 Tablet(s) Oral at bedtime  triamcinolone 0.1% Ointment 1 Application(s) Topical two times a day    MEDICATIONS  (PRN):  dextrose Oral Gel 15 Gram(s) Oral once PRN Blood Glucose LESS THAN 70 milliGRAM(s)/deciliter  metoclopramide Injectable 10 milliGRAM(s) IV Push every 6 hours PRN nausea vomiting  polyethylene glycol 3350 17 Gram(s) Oral two times a day PRN Constipation  sodium chloride 0.9% lock flush 10 milliLiter(s) IV Push every 1 hour PRN Pre/post blood products, medications, blood draw, and to maintain line patency      CAPILLARY BLOOD GLUCOSE      POCT Blood Glucose.: 175 mg/dL (22 Apr 2024 08:33)  POCT Blood Glucose.: 161 mg/dL (21 Apr 2024 21:50)  POCT Blood Glucose.: 158 mg/dL (21 Apr 2024 17:17)  POCT Blood Glucose.: 130 mg/dL (21 Apr 2024 12:27)    I&O's Summary    21 Apr 2024 07:01  -  22 Apr 2024 07:00  --------------------------------------------------------  IN: 950 mL / OUT: 265 mL / NET: 685 mL        PHYSICAL EXAM:  Vital Signs Last 24 Hrs  T(C): 36.8 (22 Apr 2024 08:27), Max: 36.9 (22 Apr 2024 00:00)  T(F): 98.3 (22 Apr 2024 08:27), Max: 98.5 (22 Apr 2024 04:00)  HR: 109 (22 Apr 2024 08:27) (92 - 109)  BP: 104/62 (22 Apr 2024 08:27) (97/60 - 115/74)  BP(mean): --  RR: 19 (22 Apr 2024 08:27) (18 - 19)  SpO2: 96% (22 Apr 2024 08:27) (95% - 99%)    Parameters below as of 22 Apr 2024 08:27  Patient On (Oxygen Delivery Method): nasal cannula        PHYSICAL EXAM:  CONSTITUTIONAL: NAD, grossly erythematous  HEET: MMM, EOMI, PERRLA  NECK: supple  RESPIRATORY: Normal respiratory effort; bibasilar crackles   CARDIOVASCULAR: +punch biopsy on chest, no bleeding. Regular rate and rhythm, normal S1 and S2, chronic lymphedema   ABDOMEN: Nontender to palpation, normoactive bowel sounds, no rebound/guarding;  MUSCULOSKELETAL: full ROM   NEURO: Moving all four extremities, sensation grossly intact  PSYCH: A+O to person, place, and time; affect appropriate  SKIN: psoriatic lesions on b/l UE and lymphedema rashes on LE. flushed skin, much improved, dry    LABS:                        10.4   10.36 )-----------( 151      ( 22 Apr 2024 05:02 )             33.9     04-22    131<L>  |  94<L>  |  63<H>  ----------------------------<  155<H>  5.0   |  23  |  4.75<H>    Ca    8.5      22 Apr 2024 05:02  Phos  3.1     04-22  Mg     2.2     04-22    TPro  6.2  /  Alb  2.9<L>  /  TBili  0.7  /  DBili  x   /  AST  16  /  ALT  5<L>  /  AlkPhos  65  04-22    PT/INR - ( 22 Apr 2024 05:02 )   PT: 12.4 sec;   INR: 1.13 ratio         PTT - ( 22 Apr 2024 05:02 )  PTT:26.0 sec      Urinalysis Basic - ( 22 Apr 2024 05:02 )    Color: x / Appearance: x / SG: x / pH: x  Gluc: 155 mg/dL / Ketone: x  / Bili: x / Urobili: x   Blood: x / Protein: x / Nitrite: x   Leuk Esterase: x / RBC: x / WBC x   Sq Epi: x / Non Sq Epi: x / Bacteria: x         PROGRESS NOTE:   Authored by Dr. Mireya Hayden MD (PGY-1). Pager [facility] [phone]/ [facility]     Patient is a 73y old  Female who presents with a chief complaint of Syncope (21 Apr 2024 21:26)      SUBJECTIVE / OVERNIGHT EVENTS:  No acute events overnight. Sleeping comofrtably in bed. Denies CP, SOB    All other ROS neg except as above in HPI    MEDICATIONS  (STANDING):  ascorbic acid 500 milliGRAM(s) Oral daily  aspirin  chewable 81 milliGRAM(s) Oral daily  atorvastatin 80 milliGRAM(s) Oral at bedtime  calamine/zinc oxide Lotion 1 Application(s) Topical three times a day  cetirizine 10 milliGRAM(s) Oral daily  chlorhexidine 4% Liquid 1 Application(s) Topical <User Schedule>  dextrose 10% Bolus 125 milliLiter(s) IV Bolus once  dextrose 5%. 1000 milliLiter(s) (100 mL/Hr) IV Continuous <Continuous>  dextrose 5%. 1000 milliLiter(s) (50 mL/Hr) IV Continuous <Continuous>  dextrose 50% Injectable 12.5 Gram(s) IV Push once  dextrose 50% Injectable 25 Gram(s) IV Push once  diphenhydrAMINE Injectable 50 milliGRAM(s) IV Push every 6 hours  ferrous    sulfate 325 milliGRAM(s) Oral two times a day  glucagon  Injectable 1 milliGRAM(s) IntraMuscular once  insulin glargine Injectable (LANTUS) 18 Unit(s) SubCutaneous at bedtime  insulin lispro (ADMELOG) corrective regimen sliding scale   SubCutaneous at bedtime  insulin lispro (ADMELOG) corrective regimen sliding scale   SubCutaneous three times a day before meals  insulin lispro Injectable (ADMELOG) 4 Unit(s) SubCutaneous three times a day before meals  levothyroxine 75 MICROGram(s) Oral daily  multivitamin 1 Tablet(s) Oral daily  nystatin    Suspension 639925 Unit(s) Oral four times a day  nystatin/triamcinolone Cream 1 Application(s) Topical every 12 hours  petrolatum white Ointment 1 Application(s) Topical three times a day  senna 2 Tablet(s) Oral at bedtime  triamcinolone 0.1% Ointment 1 Application(s) Topical two times a day    MEDICATIONS  (PRN):  dextrose Oral Gel 15 Gram(s) Oral once PRN Blood Glucose LESS THAN 70 milliGRAM(s)/deciliter  metoclopramide Injectable 10 milliGRAM(s) IV Push every 6 hours PRN nausea vomiting  polyethylene glycol 3350 17 Gram(s) Oral two times a day PRN Constipation  sodium chloride 0.9% lock flush 10 milliLiter(s) IV Push every 1 hour PRN Pre/post blood products, medications, blood draw, and to maintain line patency      CAPILLARY BLOOD GLUCOSE      POCT Blood Glucose.: 175 mg/dL (22 Apr 2024 08:33)  POCT Blood Glucose.: 161 mg/dL (21 Apr 2024 21:50)  POCT Blood Glucose.: 158 mg/dL (21 Apr 2024 17:17)  POCT Blood Glucose.: 130 mg/dL (21 Apr 2024 12:27)    I&O's Summary    21 Apr 2024 07:01  -  22 Apr 2024 07:00  --------------------------------------------------------  IN: 950 mL / OUT: 265 mL / NET: 685 mL        PHYSICAL EXAM:  Vital Signs Last 24 Hrs  T(C): 36.8 (22 Apr 2024 08:27), Max: 36.9 (22 Apr 2024 00:00)  T(F): 98.3 (22 Apr 2024 08:27), Max: 98.5 (22 Apr 2024 04:00)  HR: 109 (22 Apr 2024 08:27) (92 - 109)  BP: 104/62 (22 Apr 2024 08:27) (97/60 - 115/74)  BP(mean): --  RR: 19 (22 Apr 2024 08:27) (18 - 19)  SpO2: 96% (22 Apr 2024 08:27) (95% - 99%)    Parameters below as of 22 Apr 2024 08:27  Patient On (Oxygen Delivery Method): nasal cannula        PHYSICAL EXAM:  CONSTITUTIONAL: NAD, grossly erythematous  HEET: MMM, EOMI, PERRLA  NECK: supple  RESPIRATORY: Normal respiratory effort; bibasilar crackles   CARDIOVASCULAR: +punch biopsy on chest, no bleeding. Regular rate and rhythm, normal S1 and S2, systolic murmur, chronic lymphedema   ABDOMEN: Nontender to palpation, normoactive bowel sounds, no rebound/guarding;  MUSCULOSKELETAL: full ROM   NEURO: Moving all four extremities, sensation grossly intact  PSYCH: A+O to person, place, and time; affect appropriate  SKIN: psoriatic lesions on b/l UE and lymphedema rashes on LE. flushed skin, much improved, dry    LABS:                        10.4   10.36 )-----------( 151      ( 22 Apr 2024 05:02 )             33.9     04-22    131<L>  |  94<L>  |  63<H>  ----------------------------<  155<H>  5.0   |  23  |  4.75<H>    Ca    8.5      22 Apr 2024 05:02  Phos  3.1     04-22  Mg     2.2     04-22    TPro  6.2  /  Alb  2.9<L>  /  TBili  0.7  /  DBili  x   /  AST  16  /  ALT  5<L>  /  AlkPhos  65  04-22    PT/INR - ( 22 Apr 2024 05:02 )   PT: 12.4 sec;   INR: 1.13 ratio         PTT - ( 22 Apr 2024 05:02 )  PTT:26.0 sec      Urinalysis Basic - ( 22 Apr 2024 05:02 )    Color: x / Appearance: x / SG: x / pH: x  Gluc: 155 mg/dL / Ketone: x  / Bili: x / Urobili: x   Blood: x / Protein: x / Nitrite: x   Leuk Esterase: x / RBC: x / WBC x   Sq Epi: x / Non Sq Epi: x / Bacteria: x

## 2024-04-22 NOTE — PROGRESS NOTE ADULT - PROBLEM SELECTOR PLAN 4
RRT 4/16 for sepsis rectal temp 102, hypotension to systolic 70s, no leukocytosis, with new onset vomiting and rash  likely 2/2 delayed contrast rxn  -Derm Bx consistent with AGEP  -much improved    Plan:  -c/w benadryl  -c/w tropical medications for rash  -Will premedicate with benadryl prior to TAVR on 5/1

## 2024-04-22 NOTE — PROGRESS NOTE ADULT - SUBJECTIVE AND OBJECTIVE BOX
24H hour events:     MEDICATIONS:    nystatin    Suspension 359808 Unit(s) Oral four times a day    cetirizine 10 milliGRAM(s) Oral daily  diphenhydrAMINE Injectable 50 milliGRAM(s) IV Push every 6 hours    metoclopramide Injectable 10 milliGRAM(s) IV Push every 6 hours PRN    polyethylene glycol 3350 17 Gram(s) Oral two times a day PRN  senna 2 Tablet(s) Oral at bedtime    atorvastatin 80 milliGRAM(s) Oral at bedtime  dextrose 50% Injectable 12.5 Gram(s) IV Push once  dextrose 50% Injectable 25 Gram(s) IV Push once  dextrose Oral Gel 15 Gram(s) Oral once PRN  glucagon  Injectable 1 milliGRAM(s) IntraMuscular once  insulin glargine Injectable (LANTUS) 18 Unit(s) SubCutaneous at bedtime  insulin lispro (ADMELOG) corrective regimen sliding scale   SubCutaneous at bedtime  insulin lispro (ADMELOG) corrective regimen sliding scale   SubCutaneous three times a day before meals  insulin lispro Injectable (ADMELOG) 4 Unit(s) SubCutaneous three times a day before meals  levothyroxine 75 MICROGram(s) Oral daily    ascorbic acid 500 milliGRAM(s) Oral daily  calamine/zinc oxide Lotion 1 Application(s) Topical three times a day  chlorhexidine 4% Liquid 1 Application(s) Topical <User Schedule>  dextrose 10% Bolus 125 milliLiter(s) IV Bolus once  dextrose 5%. 1000 milliLiter(s) IV Continuous <Continuous>  dextrose 5%. 1000 milliLiter(s) IV Continuous <Continuous>  ferrous    sulfate 325 milliGRAM(s) Oral two times a day  multivitamin 1 Tablet(s) Oral daily  nystatin/triamcinolone Cream 1 Application(s) Topical every 12 hours  petrolatum white Ointment 1 Application(s) Topical three times a day  sodium chloride 0.9% lock flush 10 milliLiter(s) IV Push every 1 hour PRN  triamcinolone 0.1% Ointment 1 Application(s) Topical two times a day      REVIEW OF SYSTEMS:  See HPI, otherwise ROS negative.    PHYSICAL EXAM:  T(C): 36.8 (04-22-24 @ 08:27), Max: 36.9 (04-22-24 @ 00:00)  HR: 109 (04-22-24 @ 08:27) (92 - 109)  BP: 104/62 (04-22-24 @ 08:27) (97/60 - 115/74)  RR: 19 (04-22-24 @ 08:27) (18 - 19)  SpO2: 96% (04-22-24 @ 08:27) (95% - 99%)  Wt(kg): --  I&O's Summary    21 Apr 2024 07:01  -  22 Apr 2024 07:00  --------------------------------------------------------  IN: 950 mL / OUT: 265 mL / NET: 685 mL    Appearance: Alert. NAD	  HEENT:   NC/AT	  Cardiovascular: +S1S2   Psychiatry: A & O x 3,   Gastrointestinal:  Soft, NT.ND., + BS	  Skin: No rashes	  Neurologic: Non-focal  Extremities: +edema and erythema of bilateral lower extremities  Vascular: Peripheral pulses palpable 2+ bilaterally    LABS:	 	  CBC Full  -  ( 22 Apr 2024 05:02 )  WBC Count : 10.36 K/uL  Hemoglobin : 10.4 g/dL  Hematocrit : 33.9 %  Platelet Count - Automated : 151 K/uL  Mean Cell Volume : 91.9 fl  Mean Cell Hemoglobin : 28.2 pg  Mean Cell Hemoglobin Concentration : 30.7 gm/dL  Auto Neutrophil # : 6.00 K/uL  Auto Lymphocyte # : 1.37 K/uL  Auto Monocyte # : 1.40 K/uL  Auto Eosinophil # : 1.30 K/uL  Auto Basophil # : 0.04 K/uL  Auto Neutrophil % : 58.0 %  Auto Lymphocyte % : 13.2 %  Auto Monocyte % : 13.5 %  Auto Eosinophil % : 12.5 %  Auto Basophil % : 0.4 %    04-22    131<L>  |  94<L>  |  63<H>  ----------------------------<  155<H>  5.0   |  23  |  4.75<H>  04-21    134<L>  |  94<L>  |  56<H>  ----------------------------<  150<H>  4.3   |  26  |  4.02<H>    Ca    8.5      22 Apr 2024 05:02  Ca    8.7      21 Apr 2024 11:38  Phos  3.1     04-22  Phos  2.5     04-21  Mg     2.2     04-22  Mg     2.2     04-21    TPro  6.2  /  Alb  2.9<L>  /  TBili  0.7  /  DBili  x   /  AST  16  /  ALT  5<L>  /  AlkPhos  65  04-22  TPro  6.5  /  Alb  3.2<L>  /  TBili  0.7  /  DBili  x   /  AST  11  /  ALT  7<L>  /  AlkPhos  58  04-21    TELEMETRY: Sinus tachy 100s  	    ECHO:  TRANSTHORACIC ECHOCARDIOGRAM REPORT  ________________________________________________________________________________                                      _______       Pt. Name:       EVELYN LOPEZ Study Date:    4/16/2024  MRN:            [id number]        YOB: 1950  Accession #:    [id number]        Age:           73 years  Account#:       [id number]     Gender:        F  Heart Rate:     84 bpm           Height:        70.00 in (177.80 cm)  Rhythm:                          Weight:        330.00 lb (149.69 kg)  Blood Pressure: 135/69 mmHg      BSA/BMI:       2.58 m² / 47.35 kg/m²  ________________________________________________________________________________________  Referring Physician:    [id number] Anitra Morgan  Interpreting Physician: Gibran Canela MD  Primary Sonographer:    Getachew Watts [facility]    CPT:               ECHO TTE W/O CON F/U [facility] - 90140.m;[facility] - 14550.m  Indication(s):     Cardiac murmur, unspecified - R01.1  Procedure:         Limited transthoracic echocardiogram.  Ordering Location: [facility]  Admission Status:  Inpatient  Study Information: Image quality for this study is fair.    _______________________________________________________________________________________     CONCLUSIONS:      1. Limited study to evaluate the aortic valve.   2. Severe aortic stenosis.    ________________________________________________________________________________________  FINDINGS:     Aortic Valve:  There is severe aortic stenosis. The peak transaortic velocity is 4.29 m/s, peak transaortic gradient is 73.6 mmHg and mean transaortic gradient is 43.0 mmHg with an LVOT/aortic valve VTI ratio of 0.21. The aortic valve area is estimated at 0.93 cm² by the continuity equation. There is trace aortic regurgitation.  ____________________________________________________________________  QUANTITATIVE DATA:     LVOT / RVOT/ Qp/Qs Data: (Indexed to BSA)  LVOT Diameter:  2.40 cm  LVOT Area:      4.52 cm²  LVOT Vmax:      0.90 m/s  LVOT Vmn:       0.635 m/s  LVOT VTI:       21.20 cm  LVOT peak grad: 3 mmHg  LVOT mean grad: 11.7 mmHg  LVOT SV:        95.9 ml   37.14 ml/m²    Aortic Valve Measurements:  AV Vmax:                4.3 m/s  AV Peak Gradient:       73.6 mmHg  AV Mean Gradient:       43.0 mmHg  AV VTI:                 103.0 cm  AV VTI Ratio:           0.21  AoV EOA, Contin:        0.93 cm²  AoV EOA, Contin i:      0.36 cm²/m²  AoV Dimensionless Index 0.21    ________________________________________________________________________________________  Electronically signed on 4/16/2024 at 6:27:46 PM by Gibran Canela MD     TRANSTHORACIC ECHOCARDIOGRAM REPORT  ________________________________________________________________________________                                      _______       Pt. Name:       EVELYN LOPEZ Study Date:    4/1/2024  MRN:            [id number]         YOB: 1950  Accession #:    [id number]        Age:           73 years  Account#:       [id number]       Gender:        F  Heart Rate:                      Height:        71.00 in (180.34 cm)  Rhythm:                          Weight:        330.69 lb (150.00 kg)  Blood Pressure: 116/68 mmHg      BSA/BMI:       2.61 m² / 46.12 kg/m²  ________________________________________________________________________________________  Referring Physician:    [id number] Luis Enrique Luo  Interpreting Physician: Pia Sutton MD  Primary Sonographer:    Marlon Chen    CPT:               ECHO TTE WO CON COMP W DOPP - 64457.m  Indication(s):     Dyspnea, unspecified - R06.00  Procedure:         Transthoracic echocardiogram with 2-D, M-mode and complete                     spectral and color flow Doppler.  Ordering Location: [facility]  Admission Status:  Inpatient  Study Information: Image quality for this study is technically difficult.    _______________________________________________________________________________________     CONCLUSIONS:      1. Technically difficult image quality.   2. Left ventricular systolic function is moderately to severely decreased with an ejection fraction of 30 % by Nunes's method of disks.   3. Mildto moderate left ventricular hypertrophy.   4. The right ventricle is not well visualized. Based on visual assessment, the right ventricle appears mildly enlarged. mildly reduced systolic function.   5. The left atrium is mildly dilated.   6. Trace aortic regurgitation.   7. Mild mitral regurgitation.   8. Moderate to severe aortic stenosis. There is a Vmax of 3.66m/s, Mean gradient of 26mmHg and a DI of 0.19. The JUAN DANIEL is calculated as 0.61cm2 by continuity. This likely represents low flow low gradient AS.   9. Estimated pulmonary artery systolic pressure is 59 mmHg, consistent with moderate pulmonary hypertension.  10. The inferior vena cava is dilated measuring 2.60 cm in diameter, (dilated >2.1cm) with normal inspiratory collapse (normal >50%) consistent with mildly elevated right atrial pressure (~8, range 5-10mmHg).    ________________________________________________________________________________________  FINDINGS:     Left Ventricle:  Left ventricular systolic function is moderately to severely decreased with a calculated ejection fraction of 30 % by the Nunes's biplane method of disks. Mild to moderate left ventricular hypertrophy.     Right Ventricle:  The right ventricle is not well visualized. Based on visual assessment, the right ventricle appears mildly enlarged. The right ventricular cavity is mildly enlarged in size and mildly reduced systolic function. Tricuspid annular plane systolic excursion (TAPSE) is 2.4 cm (normal >=1.7 cm).     Left Atrium:  The left atrium is mildly dilated with an indexed volume of 34.20 ml/m².     Right Atrium:  The right atrium is normal in size.     Aortic Valve:  There is calcification of the aortic valve leaflets. There is moderate to severe aortic stenosis. The peak transaorticvelocity is 3.66 m/s, peak transaortic gradient is 53.6 mmHg and mean transaortic gradient is 26.0 mmHg with an LVOT/aortic valve VTI ratio of 0.19. The aortic valve area is estimated at 0.61 cm² by the continuity equation and 1.16 cm² by 2D planimetry. There is trace aortic regurgitation.     Mitral Valve:  There is mild calcification of the mitral valve annulus. Mitral valve leaflets are diffusely calcified. There is mild mitral regurgitation.     Tricuspid Valve:  There is mild tricuspid regurgitation. Estimated pulmonary artery systolic pressure is 59 mmHg, consistent with moderate pulmonary hypertension.     Pulmonic Valve:  The pulmonic valve was not well visualized. There is trace pulmonic regurgitation.     Aorta:  The aortic root at the sinuses of Valsalva is normal in size, measuring 2.90 cm (indexed 1.11 cm/m²).     Systemic Veins:  The inferior vena cava is dilated measuring 2.60 cm in diameter, (dilated >2.1cm) with normal inspiratory collapse (normal >50%) consistent with mildly elevated right atrial pressure (~8, range 5-10mmHg).  ____________________________________________________________________  QUANTITATIVE DATA:  Left Ventricle Measurements: (Indexed to BSA)     IVSd (2D):   1.3 cm  LVPWd (2D):  1.2 cm  LVIDd (2D):  5.4 cm  LVIDs (2D):  4.7 cm  LV Mass:     284 g  108.7 g/m²  LV Vol d, MOD A2C: 138.0 ml 52.85 ml/m²  LV Vol d, MOD A4C: 114.0 ml 43.65 ml/m²  LV Vol d, MOD BP:  128.3 ml 49.14 ml/m²  LV Vol s, MOD A2C: 86.9 ml  33.28 ml/m²  LV Vol s, MOD A4C: 88.5ml  33.89 ml/m²  LV Vol s, MOD BP:  89.5 ml  34.28 ml/m²  LVOT SV MOD BP:    38.8 ml  LV EF% MOD BP:     30 %     MV E Vmax:    0.75 m/s  MV A Vmax:    0.97 m/s  MV E/A:       0.77  e' lateral:   8.16 cm/s  e' medial:    3.59 cm/s  E/e' lateral: 9.18  E/e' medial:  20.86  E/e' Average: 12.75  MV DT:        268 msec    Aorta Measurements: (Normal range) (Indexed to BSA)     Sinuses of Valsalva: 2.90 cm (2.7 - 3.3 cm)       Left Atrium Measurements: (Indexed to BSA)  LA Diam 2D: 4.90 cm    Right Ventricle Measurements:     TAPSE:            2.4 cm  RV Base (RVID1):  4.9 cm  RV Mid (RVID2):   4.1 cm  RV Major (RVID3): 8.3 cm       LVOT / RVOT/ Qp/Qs Data: (Indexed to BSA)  LVOT Diameter: 2.00 cm  LVOT Vmax:     0.67 m/s  LVOT VTI:      19.00 cm  LVOT SV:       59.7 ml  22.86 ml/m²    Aortic Valve Measurements:  AV Vmax:                3.7 m/s  AV Peak Gradient:       53.6 mmHg  AV Mean Gradient:       26.0 mmHg  AV VTI:                 97.8 cm  AV VTI Ratio:           0.19  AoV EOA, Contin:      0.61 cm²  AoV EOA, Contin i:      0.23 cm²/m²  AoV area, 2D planim:    1.16 cm²  AoV Dimensionless Index 0.19    Mitral Valve Measurements:     MV E Vmax: 0.7 m/s  MV A Vmax: 1.0 m/s  MV E/A:    0.8       Tricuspid Valve Measurements:     TR Vmax:          3.6 m/s  TR Peak Gradient: 51.3 mmHg  RA Pressure:      8 mmHg  PASP:             59 mmHg    ________________________________________________________________________________________  Electronically signed on 4/1/2024 at 2:34:33 PM by Pia Sutton MD      24H hour events: No acute events overnight    MEDICATIONS:    nystatin    Suspension 636616 Unit(s) Oral four times a day  cetirizine 10 milliGRAM(s) Oral daily  diphenhydrAMINE Injectable 50 milliGRAM(s) IV Push every 6 hours  metoclopramide Injectable 10 milliGRAM(s) IV Push every 6 hours PRN  polyethylene glycol 3350 17 Gram(s) Oral two times a day PRN  senna 2 Tablet(s) Oral at bedtime  atorvastatin 80 milliGRAM(s) Oral at bedtime  dextrose 50% Injectable 12.5 Gram(s) IV Push once  dextrose 50% Injectable 25 Gram(s) IV Push once  dextrose Oral Gel 15 Gram(s) Oral once PRN  glucagon  Injectable 1 milliGRAM(s) IntraMuscular once  insulin glargine Injectable (LANTUS) 18 Unit(s) SubCutaneous at bedtime  insulin lispro (ADMELOG) corrective regimen sliding scale   SubCutaneous at bedtime  insulin lispro (ADMELOG) corrective regimen sliding scale   SubCutaneous three times a day before meals  insulin lispro Injectable (ADMELOG) 4 Unit(s) SubCutaneous three times a day before meals  levothyroxine 75 MICROGram(s) Oral daily  ascorbic acid 500 milliGRAM(s) Oral daily  calamine/zinc oxide Lotion 1 Application(s) Topical three times a day  chlorhexidine 4% Liquid 1 Application(s) Topical <User Schedule>  dextrose 10% Bolus 125 milliLiter(s) IV Bolus once  dextrose 5%. 1000 milliLiter(s) IV Continuous <Continuous>  dextrose 5%. 1000 milliLiter(s) IV Continuous <Continuous>  ferrous    sulfate 325 milliGRAM(s) Oral two times a day  multivitamin 1 Tablet(s) Oral daily  nystatin/triamcinolone Cream 1 Application(s) Topical every 12 hours  petrolatum white Ointment 1 Application(s) Topical three times a day  sodium chloride 0.9% lock flush 10 milliLiter(s) IV Push every 1 hour PRN  triamcinolone 0.1% Ointment 1 Application(s) Topical two times a day    REVIEW OF SYSTEMS:  See HPI, otherwise ROS negative.    PHYSICAL EXAM:  T(C): 36.8 (04-22-24 @ 08:27), Max: 36.9 (04-22-24 @ 00:00)  HR: 109 (04-22-24 @ 08:27) (92 - 109)  BP: 104/62 (04-22-24 @ 08:27) (97/60 - 115/74)  RR: 19 (04-22-24 @ 08:27) (18 - 19)  SpO2: 96% (04-22-24 @ 08:27) (95% - 99%)  Wt(kg): --  I&O's Summary    21 Apr 2024 07:01  -  22 Apr 2024 07:00  --------------------------------------------------------  IN: 950 mL / OUT: 265 mL / NET: 685 mL    Appearance: Alert. NAD	  HEENT:   NC/AT	  Cardiovascular: +S1S2   Psychiatry: A & O x 3,   Gastrointestinal:  Soft, NT.ND., + BS	  Skin: No rashes	  Neurologic: Non-focal  Extremities: +edema and erythema of bilateral lower extremities  Vascular: Peripheral pulses palpable 2+ bilaterally    LABS:	 	  CBC Full  -  ( 22 Apr 2024 05:02 )  WBC Count : 10.36 K/uL  Hemoglobin : 10.4 g/dL  Hematocrit : 33.9 %  Platelet Count - Automated : 151 K/uL  Mean Cell Volume : 91.9 fl  Mean Cell Hemoglobin : 28.2 pg  Mean Cell Hemoglobin Concentration : 30.7 gm/dL  Auto Neutrophil # : 6.00 K/uL  Auto Lymphocyte # : 1.37 K/uL  Auto Monocyte # : 1.40 K/uL  Auto Eosinophil # : 1.30 K/uL  Auto Basophil # : 0.04 K/uL  Auto Neutrophil % : 58.0 %  Auto Lymphocyte % : 13.2 %  Auto Monocyte % : 13.5 %  Auto Eosinophil % : 12.5 %  Auto Basophil % : 0.4 %    04-22    131<L>  |  94<L>  |  63<H>  ----------------------------<  155<H>  5.0   |  23  |  4.75<H>  04-21    134<L>  |  94<L>  |  56<H>  ----------------------------<  150<H>  4.3   |  26  |  4.02<H>    Ca    8.5      22 Apr 2024 05:02  Ca    8.7      21 Apr 2024 11:38  Phos  3.1     04-22  Phos  2.5     04-21  Mg     2.2     04-22  Mg     2.2     04-21    TPro  6.2  /  Alb  2.9<L>  /  TBili  0.7  /  DBili  x   /  AST  16  /  ALT  5<L>  /  AlkPhos  65  04-22  TPro  6.5  /  Alb  3.2<L>  /  TBili  0.7  /  DBili  x   /  AST  11  /  ALT  7<L>  /  AlkPhos  58  04-21    TELEMETRY: Sinus tachy 100s  	    ECHO:  TRANSTHORACIC ECHOCARDIOGRAM REPORT  ________________________________________________________________________________                                      _______       Pt. Name:       EVELYN LOPEZ Study Date:    4/16/2024  MRN:            [id number]        YOB: 1950  Accession #:    [id number]        Age:           73 years  Account#:       [id number]     Gender:        F  Heart Rate:     84 bpm           Height:        70.00 in (177.80 cm)  Rhythm:                          Weight:        330.00 lb (149.69 kg)  Blood Pressure: 135/69 mmHg      BSA/BMI:       2.58 m² / 47.35 kg/m²  ________________________________________________________________________________________  Referring Physician:    [id number] Anitra Morgan  Interpreting Physician: Gibran Canela MD  Primary Sonographer:    Getachew Watts [facility]    CPT:               ECHO TTE W/O CON F/U [facility] - 36297.m;LIMITED SPECTRAL - 38959.  Indication(s):     Cardiac murmur, unspecified - R01.1  Procedure:         Limited transthoracic echocardiogram.  Ordering Location: [facility]  Admission Status:  Inpatient  Study Information: Image quality for this study is fair.    _______________________________________________________________________________________     CONCLUSIONS:      1. Limited study to evaluate the aortic valve.   2. Severe aortic stenosis.    ________________________________________________________________________________________  FINDINGS:     Aortic Valve:  There is severe aortic stenosis. The peak transaortic velocity is 4.29 m/s, peak transaortic gradient is 73.6 mmHg and mean transaortic gradient is 43.0 mmHg with an LVOT/aortic valve VTI ratio of 0.21. The aortic valve area is estimated at 0.93 cm² by the continuity equation. There is trace aortic regurgitation.  ____________________________________________________________________  QUANTITATIVE DATA:     LVOT / RVOT/ Qp/Qs Data: (Indexed to BSA)  LVOT Diameter:  2.40 cm  LVOT Area:      4.52 cm²  LVOT Vmax:      0.90 m/s  LVOT Vmn:       0.635 m/s  LVOT VTI:       21.20 cm  LVOT peak grad: 3 mmHg  LVOT mean grad: 11.7 mmHg  LVOT SV:        95.9 ml   37.14 ml/m²    Aortic Valve Measurements:  AV Vmax:                4.3 m/s  AV Peak Gradient:       73.6 mmHg  AV Mean Gradient:       43.0 mmHg  AV VTI:                 103.0 cm  AV VTI Ratio:           0.21  AoV EOA, Contin:        0.93 cm²  AoV EOA, Contin i:      0.36 cm²/m²  AoV Dimensionless Index 0.21    ________________________________________________________________________________________  Electronically signed on 4/16/2024 at 6:27:46 PM by Gibran Canela MD     TRANSTHORACIC ECHOCARDIOGRAM REPORT  ________________________________________________________________________________                                      _______       Pt. Name:       EVELYN LOPEZ Study Date:    4/1/2024  MRN:            [id number]         YOB: 1950  Accession #:    [id number]        Age:           73 years  Account#:       [id number]       Gender:        F  Heart Rate:                      Height:        71.00 in (180.34 cm)  Rhythm:                          Weight:        330.69 lb (150.00 kg)  Blood Pressure: 116/68 mmHg      BSA/BMI:       2.61 m² / 46.12 kg/m²  ________________________________________________________________________________________  Referring Physician:    [id number] Luis Enrique Luo  Interpreting Physician: Pia Sutton MD  Primary Sonographer:    Marlon Chen    CPT:               ECHO TTE WO CON COMP W DOPP - 66840.m  Indication(s):     Dyspnea, unspecified - R06.00  Procedure:         Transthoracic echocardiogram with 2-D, M-mode and complete                     spectral and color flow Doppler.  Ordering Location: [facility]  Admission Status:  Inpatient  Study Information: Image quality for this study is technically difficult.    _______________________________________________________________________________________     CONCLUSIONS:      1. Technically difficult image quality.   2. Left ventricular systolic function is moderately to severely decreased with an ejection fraction of 30 % by Nunes's method of disks.   3. Mildto moderate left ventricular hypertrophy.   4. The right ventricle is not well visualized. Based on visual assessment, the right ventricle appears mildly enlarged. mildly reduced systolic function.   5. The left atrium is mildly dilated.   6. Trace aortic regurgitation.   7. Mild mitral regurgitation.   8. Moderate to severe aortic stenosis. There is a Vmax of 3.66m/s, Mean gradient of 26mmHg and a DI of 0.19. The JUAN DANIEL is calculated as 0.61cm2 by continuity. This likely represents low flow low gradient AS.   9. Estimated pulmonary artery systolic pressure is 59 mmHg, consistent with moderate pulmonary hypertension.  10. The inferior vena cava is dilated measuring 2.60 cm in diameter, (dilated >2.1cm) with normal inspiratory collapse (normal >50%) consistent with mildly elevated right atrial pressure (~8, range 5-10mmHg).    ________________________________________________________________________________________  FINDINGS:     Left Ventricle:  Left ventricular systolic function is moderately to severely decreased with a calculated ejection fraction of 30 % by the Nunes's biplane method of disks. Mild to moderate left ventricular hypertrophy.     Right Ventricle:  The right ventricle is not well visualized. Based on visual assessment, the right ventricle appears mildly enlarged. The right ventricular cavity is mildly enlarged in size and mildly reduced systolic function. Tricuspid annular plane systolic excursion (TAPSE) is 2.4 cm (normal >=1.7 cm).     Left Atrium:  The left atrium is mildly dilated with an indexed volume of 34.20 ml/m².     Right Atrium:  The right atrium is normal in size.     Aortic Valve:  There is calcification of the aortic valve leaflets. There is moderate to severe aortic stenosis. The peak transaorticvelocity is 3.66 m/s, peak transaortic gradient is 53.6 mmHg and mean transaortic gradient is 26.0 mmHg with an LVOT/aortic valve VTI ratio of 0.19. The aortic valve area is estimated at 0.61 cm² by the continuity equation and 1.16 cm² by 2D planimetry. There is trace aortic regurgitation.     Mitral Valve:  There is mild calcification of the mitral valve annulus. Mitral valve leaflets are diffusely calcified. There is mild mitral regurgitation.     Tricuspid Valve:  There is mild tricuspid regurgitation. Estimated pulmonary artery systolic pressure is 59 mmHg, consistent with moderate pulmonary hypertension.     Pulmonic Valve:  The pulmonic valve was not well visualized. There is trace pulmonic regurgitation.     Aorta:  The aortic root at the sinuses of Valsalva is normal in size, measuring 2.90 cm (indexed 1.11 cm/m²).     Systemic Veins:  The inferior vena cava is dilated measuring 2.60 cm in diameter, (dilated >2.1cm) with normal inspiratory collapse (normal >50%) consistent with mildly elevated right atrial pressure (~8, range 5-10mmHg).  ____________________________________________________________________  QUANTITATIVE DATA:  Left Ventricle Measurements: (Indexed to BSA)     IVSd (2D):   1.3 cm  LVPWd (2D):  1.2 cm  LVIDd (2D):  5.4 cm  LVIDs (2D):  4.7 cm  LV Mass:     284 g  108.7 g/m²  LV Vol d, MOD A2C: 138.0 ml 52.85 ml/m²  LV Vol d, MOD A4C: 114.0 ml 43.65 ml/m²  LV Vol d, MOD BP:  128.3 ml 49.14 ml/m²  LV Vol s, MOD A2C: 86.9 ml  33.28 ml/m²  LV Vol s, MOD A4C: 88.5ml  33.89 ml/m²  LV Vol s, MOD BP:  89.5 ml  34.28 ml/m²  LVOT SV MOD BP:    38.8 ml  LV EF% MOD BP:     30 %     MV E Vmax:    0.75 m/s  MV A Vmax:    0.97 m/s  MV E/A:       0.77  e' lateral:   8.16 cm/s  e' medial:    3.59 cm/s  E/e' lateral: 9.18  E/e' medial:  20.86  E/e' Average: 12.75  MV DT:        268 msec    Aorta Measurements: (Normal range) (Indexed to BSA)     Sinuses of Valsalva: 2.90 cm (2.7 - 3.3 cm)       Left Atrium Measurements: (Indexed to BSA)  LA Diam 2D: 4.90 cm    Right Ventricle Measurements:     TAPSE:            2.4 cm  RV Base (RVID1):  4.9 cm  RV Mid (RVID2):   4.1 cm  RV Major (RVID3): 8.3 cm       LVOT / RVOT/ Qp/Qs Data: (Indexed to BSA)  LVOT Diameter: 2.00 cm  LVOT Vmax:     0.67 m/s  LVOT VTI:      19.00 cm  LVOT SV:       59.7 ml  22.86 ml/m²    Aortic Valve Measurements:  AV Vmax:                3.7 m/s  AV Peak Gradient:       53.6 mmHg  AV Mean Gradient:       26.0 mmHg  AV VTI:                 97.8 cm  AV VTI Ratio:           0.19  AoV EOA, Contin:      0.61 cm²  AoV EOA, Contin i:      0.23 cm²/m²  AoV area, 2D planim:    1.16 cm²  AoV Dimensionless Index 0.19    Mitral Valve Measurements:     MV E Vmax: 0.7 m/s  MV A Vmax: 1.0 m/s  MV E/A:    0.8       Tricuspid Valve Measurements:     TR Vmax:          3.6 m/s  TR Peak Gradient: 51.3 mmHg  RA Pressure:      8 mmHg  PASP:             59 mmHg    ________________________________________________________________________________________  Electronically signed on 4/1/2024 at 2:34:33 PM by Pia Sutton MD

## 2024-04-22 NOTE — PROGRESS NOTE ADULT - SUBJECTIVE AND OBJECTIVE BOX
[facility]   [phone]  RENAL FOLLOW UP NOTE  --------------------------------------------------------------------------------  Chief Complaint:    24 hour events/subjective:    seen earlier and on hd   denies worsening sob     PAST HISTORY  --------------------------------------------------------------------------------  No significant changes to PMH, PSH, FHx, SHx, unless otherwise noted    ALLERGIES & MEDICATIONS  --------------------------------------------------------------------------------  Allergies    contrast media (iodine-based) (Fever; Vomiting; Flushing; Hypotension; Rash; Stomach Upset)  Ativan (Rash; Urticaria)  penicillins (Hives (Mod to Severe); Anaphylaxis (Mod to Severe); Short breath (Mod to Severe); Angioedema (Mod to Severe))    Intolerances      Standing Inpatient Medications  ascorbic acid 500 milliGRAM(s) Oral daily  atorvastatin 80 milliGRAM(s) Oral at bedtime  calamine/zinc oxide Lotion 1 Application(s) Topical three times a day  cetirizine 10 milliGRAM(s) Oral daily  chlorhexidine 4% Liquid 1 Application(s) Topical <User Schedule>  dextrose 10% Bolus 125 milliLiter(s) IV Bolus once  dextrose 5%. 1000 milliLiter(s) IV Continuous <Continuous>  dextrose 5%. 1000 milliLiter(s) IV Continuous <Continuous>  dextrose 50% Injectable 12.5 Gram(s) IV Push once  dextrose 50% Injectable 25 Gram(s) IV Push once  ferrous    sulfate 325 milliGRAM(s) Oral two times a day  glucagon  Injectable 1 milliGRAM(s) IntraMuscular once  insulin glargine Injectable (LANTUS) 18 Unit(s) SubCutaneous at bedtime  insulin lispro (ADMELOG) corrective regimen sliding scale   SubCutaneous three times a day before meals  insulin lispro (ADMELOG) corrective regimen sliding scale   SubCutaneous at bedtime  insulin lispro Injectable (ADMELOG) 4 Unit(s) SubCutaneous three times a day before meals  levothyroxine 75 MICROGram(s) Oral daily  multivitamin 1 Tablet(s) Oral daily  nystatin    Suspension 344492 Unit(s) Oral four times a day  nystatin/triamcinolone Cream 1 Application(s) Topical every 12 hours  petrolatum white Ointment 1 Application(s) Topical three times a day  senna 2 Tablet(s) Oral at bedtime  triamcinolone 0.1% Ointment 1 Application(s) Topical two times a day    PRN Inpatient Medications  dextrose Oral Gel 15 Gram(s) Oral once PRN  metoclopramide Injectable 10 milliGRAM(s) IV Push every 6 hours PRN  polyethylene glycol 3350 17 Gram(s) Oral two times a day PRN  sodium chloride 0.9% lock flush 10 milliLiter(s) IV Push every 1 hour PRN      REVIEW OF SYSTEMS  --------------------------------------------------------------------------------    Gen: denies  fevers/chills,  CVS: denies chest pain/palpitations  Resp: denies SOB/Cough  GI: Denies N/V/Abd pain  : Denies dysuria + oliguria     VITALS/PHYSICAL EXAM  --------------------------------------------------------------------------------  T(C): 36.8 (04-22-24 @ 18:16), Max: 36.9 (04-22-24 @ 00:00)  HR: 83 (04-22-24 @ 18:16) (79 - 109)  BP: 104/62 (04-22-24 @ 18:16) (97/60 - 121/71)  RR: 18 (04-22-24 @ 18:16) (18 - 19)  SpO2: 99% (04-22-24 @ 18:16) (95% - 99%)  Wt(kg): --        04-21-24 @ 07:01  -  04-22-24 @ 07:00  --------------------------------------------------------  IN: 950 mL / OUT: 265 mL / NET: 685 mL    04-22-24 @ 07:01  -  04-22-24 @ 20:36  --------------------------------------------------------  IN: 900 mL / OUT: 2100 mL / NET: -1200 mL      Physical Exam:  	    Gen:  on RA, facial erythema, neck and arm erythema,   	Pulm: decrease bs  no rales or ronchi or wheezing  	CV: No JVD. RRR, S1S2; no rub II/VI M   	Abd: +BS, soft, nontender/nondistended, obese   	: No suprapubic tenderness  	UE: Warm, no cyanosis  no clubbing,  no edema  	LE: Warm, no cyanosis  no clubbing, 4+ edema, B/L LE raised red plaque   	Neuro: alert and oriented. speech coherent               Vascular Access: RIJ non tunneled HD catheter      LABS/STUDIES  --------------------------------------------------------------------------------              10.4   10.36 >-----------<  151      [04-22-24 @ 05:02]              33.9     131  |  94  |  63  ----------------------------<  155      [04-22-24 @ 05:02]  5.0   |  23  |  4.75        Ca     8.5     [04-22-24 @ 05:02]      Mg     2.2     [04-22-24 @ 05:02]      Phos  3.1     [04-22-24 @ 05:02]    TPro  6.2  /  Alb  2.9  /  TBili  0.7  /  DBili  x   /  AST  16  /  ALT  5   /  AlkPhos  65  [04-22-24 @ 05:02]    PT/INR: PT 12.4 , INR 1.13       [04-22-24 @ 05:02]  PTT: 26.0       [04-22-24 @ 05:02]      Creatinine Trend:  SCr 4.75 [04-22 @ 05:02]  SCr 4.02 [04-21 @ 11:38]  SCr 4.14 [04-20 @ 08:36]  SCr 4.35 [04-19 @ 06:45]  SCr 4.55 [04-18 @ 16:42]      PTH -- (Ca 8.4)      [04-19-24 @ 17:54]   109  TSH 5.06      [04-14-24 @ 07:18]  Lipid: chol 74, TG 70, HDL 33, LDL --      [04-13-24 @ 07:05]

## 2024-04-22 NOTE — PROGRESS NOTE ADULT - ATTENDING COMMENTS
73 year old female with PMH heart failure with reduced ejection fraction, AS, HTN, DM1 and hypothyroidism, who presented with syncope likely due to AS. While undergoing CT images for TAVR workup, she had an erythrodermic drug eruption likely due to the contrast. Allergy and dermatology have been consulted, recs appreciated. Will follow up derm biopsy results.     - plan for PPM on Monday followed by TAVR on Wednesday.   - continue HD session via [facility], and likely will need permacath upon discharge  - OOB to chair    Marily Dennison MD  Division of Hospital Medicine  Contact via [employer] Teams  Office: [phone]    I have personally and independently provided 51 minutes in patient encounter, reviewing tests and imaging, independently obtaining a history, performing a physical examination, discussing the plan with the patient, ordering medications/tests, documenting clinical information, and coordinating care. This excludes any time spent on teaching.

## 2024-04-22 NOTE — PROGRESS NOTE ADULT - PROBLEM SELECTOR PLAN 5
CTA CAP incidentally noted for L adrenal thickened w a 1.2 x 0.8 cm left adrenal nodule is seen.  - 1mg IV dexamethasone administered 4/17 10PM, f/u 8AM serum cortisol wnl    Plan:    - endo consulted; recs appreciated  - repeat AM cortisol low, f/u endo recs - f/u dex level  - pending CT adrenal protocol w/o contrast to eval for adrenal causes of sepsis CTA CAP incidentally noted for L adrenal thickened w a 1.2 x 0.8 cm left adrenal nodule is seen.  - 1mg IV dexamethasone administered 4/17 10PM, f/u 8AM serum cortisol wnl    Plan:    - endo consulted; recs appreciated  - f/u adrenal hormones testing  - pending CT adrenal protocol w/o contrast to eval for adrenal causes of sepsis

## 2024-04-22 NOTE — PROGRESS NOTE ADULT - PROBLEM SELECTOR PLAN 3
Unclear baseline SCr likely 2.1-2.2  Now with increasing Cr. and anuric    Plan:  - trend BUN/SCr, avoid nephrotoxic, renally dose all meds  - strict I/Os  - hardy placed for accurate I/O and AUR, maintain for now  - s/p multiple HD sessions for volume optimization pre procedure

## 2024-04-22 NOTE — PROGRESS NOTE ADULT - SUBJECTIVE AND OBJECTIVE BOX
*****Structural Heart Team*****    Subjective:    Patient resting comfortably in bed, offering no complaint. She is due to get a Micra PPM placed today.    PAST MEDICAL & SURGICAL HISTORY:  Hypertension    Diabetes    Lymphedema    H/O left bundle branch block    History of left bundle branch block (LBBB)    Systolic heart failure, chronic    Type 1 diabetes    H/O cataract  2020    History of surgical removal of meniscus of knee  left in 1971    Frozen shoulder  2000    H/O Achilles tendon repair  lengthened bilaterally, 2000    Fractured skull  1968          T(C): 36.7 (04-22-24 @ 12:31), Max: 36.9 (04-22-24 @ 00:00)  HR: 108 (04-22-24 @ 12:31) (92 - 109)  BP: 121/71 (04-22-24 @ 12:31) (97/60 - 121/71)  RR: 18 (04-22-24 @ 12:31) (18 - 19)  SpO2: 96% (04-22-24 @ 12:31) (95% - 99%)  Wt(kg): --  04-21 @ 07:01  -  04-22 @ 07:00  --------------------------------------------------------  IN: 950 mL / OUT: 265 mL / NET: 685 mL      MEDICATIONS  (STANDING):  ascorbic acid 500 milliGRAM(s) Oral daily  atorvastatin 80 milliGRAM(s) Oral at bedtime  calamine/zinc oxide Lotion 1 Application(s) Topical three times a day  cetirizine 10 milliGRAM(s) Oral daily  chlorhexidine 4% Liquid 1 Application(s) Topical <User Schedule>  dextrose 10% Bolus 125 milliLiter(s) IV Bolus once  dextrose 5%. 1000 milliLiter(s) (100 mL/Hr) IV Continuous <Continuous>  dextrose 5%. 1000 milliLiter(s) (50 mL/Hr) IV Continuous <Continuous>  dextrose 50% Injectable 12.5 Gram(s) IV Push once  dextrose 50% Injectable 25 Gram(s) IV Push once  diphenhydrAMINE Injectable 50 milliGRAM(s) IV Push every 6 hours  ferrous    sulfate 325 milliGRAM(s) Oral two times a day  glucagon  Injectable 1 milliGRAM(s) IntraMuscular once  insulin glargine Injectable (LANTUS) 18 Unit(s) SubCutaneous at bedtime  insulin lispro (ADMELOG) corrective regimen sliding scale   SubCutaneous at bedtime  insulin lispro (ADMELOG) corrective regimen sliding scale   SubCutaneous three times a day before meals  insulin lispro Injectable (ADMELOG) 4 Unit(s) SubCutaneous three times a day before meals  levothyroxine 75 MICROGram(s) Oral daily  multivitamin 1 Tablet(s) Oral daily  nystatin    Suspension 481394 Unit(s) Oral four times a day  nystatin/triamcinolone Cream 1 Application(s) Topical every 12 hours  petrolatum white Ointment 1 Application(s) Topical three times a day  senna 2 Tablet(s) Oral at bedtime  triamcinolone 0.1% Ointment 1 Application(s) Topical two times a day    MEDICATIONS  (PRN):  dextrose Oral Gel 15 Gram(s) Oral once PRN Blood Glucose LESS THAN 70 milliGRAM(s)/deciliter  metoclopramide Injectable 10 milliGRAM(s) IV Push every 6 hours PRN nausea vomiting  polyethylene glycol 3350 17 Gram(s) Oral two times a day PRN Constipation  sodium chloride 0.9% lock flush 10 milliLiter(s) IV Push every 1 hour PRN Pre/post blood products, medications, blood draw, and to maintain line patency      Review of Symptoms:  Constitutional: Awake, Alert, Follows commands  Respiratory: + SOB, CURTIS  Cardiac: Denies CP, Denies Palpitations  Gastrointestinal: Denies Pain, Denies N/V, tolerating po intake  Vascular: Negative  Extremities: + Edema, No joint pain or swelling  Neurological: Negative  Endocrine: No heat or cold intolerance, No excessive thirst  Heme/Onc: Negative    Exam:  General: A/Ox3, DOMINIC  HEENT: Supple, No JVD, Trachea midline, no masses  Pulmonary: CTAB, = Chest Excursion, no accessory muscle use  Cor: S1S2, RRR, II/VI STEVE  ECG: SR  Gastrointestinal: Soft, NT/ND, + Bowel Sounds  Neuro: = motor and sensory B/L, No focal deficits  Vascular: 2+ Chronic Lymphedema  Extremities: No joint pain or swelling  Skin: Warm/Dry/Normal color, Normal turgor, no rashes                          10.4   10.36 )-----------( 151      ( 22 Apr 2024 05:02 )             33.9   04-22    131<L>  |  94<L>  |  63<H>  ----------------------------<  155<H>  5.0   |  23  |  4.75<H>    Ca    8.5      22 Apr 2024 05:02  Phos  3.1     04-22  Mg     2.2     04-22    TPro  6.2  /  Alb  2.9<L>  /  TBili  0.7  /  DBili  x   /  AST  16  /  ALT  5<L>  /  AlkPhos  65  04-22  PT/INR - ( 22 Apr 2024 05:02 )   PT: 12.4 sec;   INR: 1.13 ratio         PTT - ( 22 Apr 2024 05:02 )  PTT:26.0 sec    Imaging Reviewed:    Echocardiogram:    Cardiac Catheterization:        Assesment/Plan:  74 y/o female with Severe Aortic Stenosis, LBBB/ Bradycardia, Adrenal Nodules, Acute on Chronic Renal Injury.    1.) Severe Aortic Stenosis: Will discuss with Dr. Martell, but Cardiac Cath not likely needed. CT complete We will discuss tentative dates for TAVR tentatiely on Wednesday of next week after PPM placed.  2.) LBBB/Syncope: Patient tentatively scheduled for PPM today  3.) Adrenal Lesions: Evaluation by Endocrinology appreciated. Workup  and any repeat imaging to be done will be directed by them, likely as an outpatient.  4.) Acute on Chronic Renal Injury: Continue to monitor Creatinine. Monitoring and RRT necessity to be determined by Dr. Merino.  5.) Rash: Patient apparently has generalized rash after IV contrast given. Would pre treat patient with Prednisone Protocol prior to any further IV Contrast administration  6.) OOB to chair.    Discussed with [place]. Jasbir [place]orne,[place]  [id number]

## 2024-04-23 NOTE — PROGRESS NOTE ADULT - SUBJECTIVE AND OBJECTIVE BOX
MR#[id number]  PATIENT NAME:JOHNATHAN LOPEZ    DATE OF SERVICE: 04-23-24 @ 08:54  Patient was seen and examined by Cuco Tubbs MD on    04-23-24 @ 08:54 .  Interim events noted.Consultant notes ,Labs,Telemetry reviewed by me       HOSPITAL COURSE: HPI:  73F w HFrEF (EF 30%), mod AS, known LBBB, HTN, IDDM1, CKD, hypothyroidism, chronic lymphedema, suspected OHS/LELIA on 3L NC home O2 p/w 1 episode of syncope. Recent admission at [facility] (3/29-4/5) for ADHF and DUNCAN s/p diuresis, discharged with new home O2 3L NC suspecting OHS/LELIA pending outpatient w/u. Since discharge a week ago, she has been staying at home with   Pt had sob walking to car. Once in car, she was still breathing heavily. Partner noticed pt was not responding to verbal stimuli for 10-15sec and witnessed R arm jerking. Pt gained consciousness quickly without postictal symptoms. Pt went to Cardiologist Dr. Nathan who recommended pt to come to ED. No fever, cp, abd pain, n/v. Leg swelling is improved from prior. Pt on torsemide 40mg which she has been taking. Pt was discharged on 3LNC which she is currently on. Patient reports she did not take Farxiga that she was discharged on as she was concerned about side effects.     In ED, patient was found afebrile, hemodynamically stable, breathing well on 3L NC. Initial labs notable for mild hyponatremia, DUNCAN on CKD, elevated proBNP and elevated pCO2 both improved from prior. (12 Apr 2024 16:31)          INTERIM EVENTS:Patient seen at bedside ,interim events noted.Awake alert is scheduled for Micra PPM today      PMH -reviewed admission note, no change since admission  HEART FAILURE: Acute[x ]Chronic[x ] Systolic[ x] Diastolic[ ] Combined Systolic and Diastolic[ ]  CAD[ ] CABG[ ] PCI[ ]  DEVICES[ ] PPM[ ] ICD[ ] ILR[ ]  ATRIAL FIBRILLATION[ ] Paroxysmal[ ] Permanent[ ] CHADS2-[  ]  DUNCAN[ ] CKD1[ ] CKD2[ ] CKD3[ ] CKD4[ ] ESRD[x ]  COPD[ ] HTN[ ]   DM[ ] Type1[ ] Type 2[ ]   CVA[ ] Paresis[ ]    AMBULATION: Assisted[ ] Cane/walker[x ] Independent[ ]    MEDICATIONS  (STANDING):  ascorbic acid 500 milliGRAM(s) Oral daily  aspirin  chewable 81 milliGRAM(s) Oral daily  atorvastatin 80 milliGRAM(s) Oral at bedtime  calamine/zinc oxide Lotion 1 Application(s) Topical three times a day  cetirizine 10 milliGRAM(s) Oral daily  chlorhexidine 4% Liquid 1 Application(s) Topical once  chlorhexidine 4% Liquid 1 Application(s) Topical <User Schedule>  dextrose 10% Bolus 125 milliLiter(s) IV Bolus once  dextrose 5%. 1000 milliLiter(s) (100 mL/Hr) IV Continuous <Continuous>  dextrose 5%. 1000 milliLiter(s) (50 mL/Hr) IV Continuous <Continuous>  dextrose 50% Injectable 12.5 Gram(s) IV Push once  dextrose 50% Injectable 25 Gram(s) IV Push once  ferrous    sulfate 325 milliGRAM(s) Oral two times a day  glucagon  Injectable 1 milliGRAM(s) IntraMuscular once  heparin   Injectable 5000 Unit(s) SubCutaneous every 8 hours  insulin glargine Injectable (LANTUS) 18 Unit(s) SubCutaneous at bedtime  insulin lispro (ADMELOG) corrective regimen sliding scale   SubCutaneous at bedtime  insulin lispro (ADMELOG) corrective regimen sliding scale   SubCutaneous three times a day before meals  insulin lispro Injectable (ADMELOG) 4 Unit(s) SubCutaneous three times a day before meals  levothyroxine 75 MICROGram(s) Oral daily  multivitamin 1 Tablet(s) Oral daily  nystatin    Suspension 227873 Unit(s) Oral four times a day  nystatin/triamcinolone Cream 1 Application(s) Topical every 12 hours  petrolatum white Ointment 1 Application(s) Topical three times a day  senna 2 Tablet(s) Oral at bedtime  triamcinolone 0.1% Ointment 1 Application(s) Topical two times a day  vancomycin  IVPB 1000 milliGRAM(s) IV Intermittent once    MEDICATIONS  (PRN):  dextrose Oral Gel 15 Gram(s) Oral once PRN Blood Glucose LESS THAN 70 milliGRAM(s)/deciliter  metoclopramide Injectable 10 milliGRAM(s) IV Push every 6 hours PRN nausea vomiting  polyethylene glycol 3350 17 Gram(s) Oral two times a day PRN Constipation  sodium chloride 0.9% lock flush 10 milliLiter(s) IV Push every 1 hour PRN Pre/post blood products, medications, blood draw, and to maintain line patency            REVIEW OF SYSTEMS:  Constitutional: [ ] fever, [ ]weight loss,  [ ]fatigue [ ]weight gain  Eyes: [ ] visual changes  Respiratory: [ ]shortness of breath;  [ ] cough, [ ]wheezing, [ ]chills, [ ]hemoptysis  Cardiovascular: [ ] chest pain, [ ]palpitations, [ ]dizziness,  [ ]leg swelling[ ]orthopnea[ ]PND  Gastrointestinal: [ ] abdominal pain, [ ]nausea, [ ]vomiting,  [ ]diarrhea [ ]Constipation [ ]Melena  Genitourinary: [ ] dysuria, [ ] hematuria [ ][place]za  Neurologic: [ ] headaches [ ] tremors[ ]weakness [ ]Paralysis Right[ ] Left[ ]  Skin: [ ] itching, [ ]burning, [ ] rashes  Endocrine: [ ] heat or cold intolerance  Musculoskeletal: [ ] joint pain or swelling; [ ] muscle, back, or extremity pain  Psychiatric: [ ] depression, [ ]anxiety, [ ]mood swings, or [ ]difficulty sleeping  Hematologic: [ ] easy bruising, [ ] bleeding gums    [ ] All remaining systems negative except as per above.   [ ]Unable to obtain.  [x] No change in ROS since admission      Vital Signs Last 24 Hrs  T(C): 36.8 (23 Apr 2024 08:18), Max: 37.1 (22 Apr 2024 22:16)  T(F): 98.2 (23 Apr 2024 08:18), Max: 98.8 (22 Apr 2024 22:16)  HR: 87 (23 Apr 2024 08:18) (79 - 108)  BP: 106/63 (23 Apr 2024 08:18) (92/53 - 121/71)  BP(mean): --  RR: 18 (23 Apr 2024 08:18) (18 - 18)  SpO2: 96% (23 Apr 2024 08:18) (93% - 100%)    Parameters below as of 23 Apr 2024 08:18  Patient On (Oxygen Delivery Method): nasal cannula      I&O's Summary    22 Apr 2024 07:01  -  23 Apr 2024 07:00  --------------------------------------------------------  IN: 1120 mL / OUT: 2220 mL / NET: -1100 mL        PHYSICAL EXAM:  General: No acute distress BMI-34  HEENT: EOMI, PERRL  Neck: Supple, [ ] JVD  Lungs: Equal air entry bilaterally; [ ] rales [ ] wheezing [ ] rhonchi  Heart: Regular rate and rhythm; [x ] murmur   2/6 [ x] systolic [ ] diastolic [ ] radiation[ ] rubs [ ]  gallops  Abdomen: Nontender, bowel sounds present  Extremities: No clubbing, cyanosis, [xx ] edema [ ]Pulses  equal and intact  Nervous system:  Alert & Oriented X3, no focal deficits  Psychiatric: Normal affect  Skin: No rashes or lesions    LABS:  04-23    135  |  95<L>  |  49<H>  ----------------------------<  107<H>  4.1   |  28  |  4.09<H>    Ca    8.3<L>      23 Apr 2024 06:53  Phos  2.8     04-23  Mg     2.1     04-23    TPro  5.9<L>  /  Alb  2.9<L>  /  TBili  0.6  /  DBili  x   /  AST  10  /  ALT  9<L>  /  AlkPhos  53  04-23    Creatinine Trend: 4.09<--, 4.75<--, 4.02<--, 4.14<--, 4.35<--, 4.55<--                        10.3   9.97  )-----------( 155      ( 23 Apr 2024 06:56 )             34.3     PT/INR - ( 22 Apr 2024 05:02 )   PT: 12.4 sec;   INR: 1.13 ratio         PTT - ( 22 Apr 2024 05:02 )  PTT:26.0 sec           TTE W or WO Ultrasound Enhancing Agent (04.16.24 @ 15:35) >  FINDINGS:     Aortic Valve:  There is severe aortic stenosis. The peak transaortic velocity is 4.29 m/s, peak transaortic gradient is 73.6 mmHg and mean transaortic gradient is 43.0 mmHg with an LVOT/aortic valve VTI ratio of 0.21. The aortic valve area is estimated at 0.93 cm² by the continuity equation. There is trace aortic regurgitation.

## 2024-04-23 NOTE — PRE PROCEDURE NOTE - PRE PROCEDURE EVALUATION
Cardiac Surgery Pre-op Note:    CC: Patient is a 73y old  Female who presents with a chief complaint of Syncope  found to  have Aortic Stenosis  now TAVR  4/24 with Dr Mcknight  and Dr Martell                                                                                                             Surgeon: Dr Mcknight    Procedure: 4/24 TAVR    Allergies    contrast media (iodine-based) (Fever; Vomiting; Flushing; Hypotension; Rash; Stomach Upset)  Ativan (Rash; Urticaria)  penicillins (Hives (Mod to Severe); Anaphylaxis (Mod to Severe); Short breath (Mod to Severe); Angioedema (Mod to Severe))    Intolerances        HPI:  73F w HFrEF (EF 30%), mod AS, known LBBB, HTN, IDDM1, CKD, hypothyroidism, chronic lymphedema, suspected OHS/LELIA on 3L NC home O2 p/w 1 episode of syncope. Recent admission at [facility] (3/29-4/5) for ADHF and DUNCAN s/p diuresis, discharged with new home O2 3L NC suspecting OHS/LELIA pending outpatient w/u. Since discharge a week ago, she has been staying at home with   Pt had sob walking to car. Once in car, she was still breathing heavily. Partner noticed pt was not responding to verbal stimuli for 10-15sec and witnessed R arm jerking. Pt gained consciousness quickly without postictal symptoms. Pt went to Cardiologist Dr. Nathan who recommended pt to come to ED. No fever, cp, abd pain, n/v. Leg swelling is improved from prior. Pt on torsemide 40mg which she has been taking. Pt was discharged on 3LNC which she is currently on. Patient reports she did not take Farxiga that she was discharged on as she was concerned about side effects.     In ED, patient was found afebrile, hemodynamically stable, breathing well on 3L NC. Initial labs notable for mild hyponatremia, DUNCAN on CKD, elevated proBNP and elevated pCO2 both improved from prior. (12 Apr 2024 16:31)      PAST MEDICAL & SURGICAL HISTORY:  Hypertension      Diabetes      Lymphedema      H/O left bundle branch block      History of left bundle branch block (LBBB)      Systolic heart failure, chronic      Type 1 diabetes      H/O cataract  2020      History of surgical removal of meniscus of knee  left in 1971      Frozen shoulder  2000      H/O Achilles tendon repair  lengthened bilaterally, 2000      Fractured skull  1968          MEDICATIONS  (STANDING):  ascorbic acid 500 milliGRAM(s) Oral daily  aspirin  chewable 81 milliGRAM(s) Oral daily  atorvastatin 80 milliGRAM(s) Oral at bedtime  calamine/zinc oxide Lotion 1 Application(s) Topical three times a day  cetirizine 10 milliGRAM(s) Oral daily  chlorhexidine 4% Liquid 1 Application(s) Topical <User Schedule>  chlorhexidine 4% Liquid 1 Application(s) Topical once  dextrose 10% Bolus 125 milliLiter(s) IV Bolus once  dextrose 5%. 1000 milliLiter(s) (100 mL/Hr) IV Continuous <Continuous>  dextrose 5%. 1000 milliLiter(s) (50 mL/Hr) IV Continuous <Continuous>  dextrose 50% Injectable 12.5 Gram(s) IV Push once  dextrose 50% Injectable 25 Gram(s) IV Push once  ferrous    sulfate 325 milliGRAM(s) Oral two times a day  glucagon  Injectable 1 milliGRAM(s) IntraMuscular once  heparin   Injectable 5000 Unit(s) SubCutaneous every 8 hours  insulin glargine Injectable (LANTUS) 18 Unit(s) SubCutaneous at bedtime  insulin lispro (ADMELOG) corrective regimen sliding scale   SubCutaneous three times a day before meals  insulin lispro (ADMELOG) corrective regimen sliding scale   SubCutaneous at bedtime  insulin lispro Injectable (ADMELOG) 4 Unit(s) SubCutaneous three times a day before meals  levothyroxine 75 MICROGram(s) Oral daily  multivitamin 1 Tablet(s) Oral daily  nystatin    Suspension 163942 Unit(s) Oral four times a day  nystatin/triamcinolone Cream 1 Application(s) Topical every 12 hours  petrolatum white Ointment 1 Application(s) Topical three times a day  senna 2 Tablet(s) Oral at bedtime  triamcinolone 0.1% Ointment 1 Application(s) Topical two times a day  vancomycin  IVPB 1000 milliGRAM(s) IV Intermittent once    MEDICATIONS  (PRN):  dextrose Oral Gel 15 Gram(s) Oral once PRN Blood Glucose LESS THAN 70 milliGRAM(s)/deciliter  metoclopramide Injectable 10 milliGRAM(s) IV Push every 6 hours PRN nausea vomiting  polyethylene glycol 3350 17 Gram(s) Oral two times a day PRN Constipation  sodium chloride 0.9% lock flush 10 milliLiter(s) IV Push every 1 hour PRN Pre/post blood products, medications, blood draw, and to maintain line patency        Labs:                        10.3   9.97  )-----------( 155      ( 23 Apr 2024 06:56 )             34.3     04-23    135  |  95<L>  |  49<H>  ----------------------------<  107<H>  4.1   |  28  |  4.09<H>    Ca    8.3<L>      23 Apr 2024 06:53  Phos  2.8     04-23  Mg     2.1     04-23    TPro  5.9<L>  /  Alb  2.9<L>  /  TBili  0.6  /  DBili  x   /  AST  10  /  ALT  9<L>  /  AlkPhos  53  04-23    PT/INR - ( 22 Apr 2024 05:02 )   PT: 12.4 sec;   INR: 1.13 ratio         PTT - ( 22 Apr 2024 05:02 )  PTT:26.0 sec    Blood Type: ABO Interpretation: O (04-23 @ 06:25)    HGB A1C: 7.4  Thyroid Panel: 5.06  MRSA:  / MSSA:   Urinalysis Basic - ( 23 Apr 2024 06:53 )    Color: x / Appearance: x / SG: x / pH: x  Gluc: 107 mg/dL / Ketone: x  / Bili: x / Urobili: x   Blood: x / Protein: x / Nitrite: x   Leuk Esterase: x / RBC: x / WBC x   Sq Epi: x / Non Sq Epi: x / Bacteria: x        CXR: < from: Xray Chest 1 View AP/PA (04.17.24 @ 12:32) >  he heart is enlarged.  Mild pulmonary edema. No focal consolidation.  There is no pneumothorax. Trace left pleural effusion.  No acute bony abnormality.    IMPRESSION:  No focal consolidation.    < end of copied text >  < from: CT Angio Coronary TAVR DENIA w/ IV Cont (04.15.24 @ 15:30) >  Suboptimal image quality.    Severely calcified (Agatston calcium score 2453) suspected bicuspid   aortic valve with a calcified raphe between the left and the right   coronary cusps.    Aortic and peripheral access vessel measurements as reported above.   Accuracy of reported measurements may be decreased due to suboptimal   image quality.    < end of copied text >  < from: TTE W or WO Ultrasound Enhancing Agent (04.16.24 @ 15:35) >  Aortic Valve:  There is severe aortic stenosis. The peak transaortic velocity is 4.29 m/s, peak transaortic gradient is 73.6 mmHg and mean transaortic gradient is 43.0 mmHg with an LVOT/aortic valve VTI ratio of 0.21. The aortic valve area is estimated at 0.93 cm² by the continuity equation. There is trace aortic regurgitation.    < end of copied text >      EKG:< from: 12 Lead ECG (04.20.24 @ 03:19) >    Ventricular Rate 111 BPM    QRS Duration 166 ms    Q-T Interval 390 ms    QTC Calculation(Bazett) 530 ms    R Axis -70 degrees    T Axis 116 degrees    Diagnosis Line PROBABLY SINUS TACHYCARDIA  LEFT AXIS DEVIATION  LEFT BUNDLE BRANCH BLOCK  ABNORMAL ECG  WHEN COMPARED WITH ECG OF 18-APR-2024 06:37, (UNCONFIRMED)  SIGNIFICANT CHANGES HAVE OCCURRED  Confirmed by MD MARY KAY, LOREE ([id number]) on 4/20/2024 11:33:05 AM    <    Gen: WN/WD decondtioned - has not ambukated since admission  Neuro: AAOx3, nonfocal  Pulm: CTA B/L  CV: RRR, S1S2 3/6  Abd:  obeselarge panus Soft, NT, ND +BS  Ext: warm well perfused equal strength    erythema - dye allergy   B/lle lymphedema chronic ace wrapped    + peripheral pulses  skin -erythema - flaking     groins with nyststin powder      Pt has AICD/PPM [ ] Yes  [ ] No             Brand Name:  Pre-op Beta Blocker ordered within 24 hrs of surgery (CABG ONLY)?  [ ] Yes  [ ] No  If not, Why?TAVR  Type & Cross  [x ] Yes  [ ] No  NPO after Midnight [ x] Yes  [ ] No  Pre-op ABX ordered, to be taped on chart:  x[ ] Yes  [ ] No     Hibiclens/Peridex ordered [x ] Yes  [ ] No  Intraop on Hold: PRBCs, CXR, VERONICA [x ]   Consent obtained  [x ] Yes  [ ] No  PLan   NPO at midnight   premedicate with prednisone 50 mg po12 hrs  7 hrs and 1 hr prior to TAVR  benadryl  50 mg IV one hour prior to tavr   Cardiac Surgery Pre-op Note:    CC: Patient is a 73y old  Female who presents with a chief complaint of Syncope  found to  have Aortic Stenosis  now TAVR  4/24 with Dr Mcknight  and Dr Martell                                                                                                             Surgeon: Dr Mcknight    Procedure: 4/24 TAVR    Allergies    contrast media (iodine-based) (Fever; Vomiting; Flushing; Hypotension; Rash; Stomach Upset)  Ativan (Rash; Urticaria)  penicillins (Hives (Mod to Severe); Anaphylaxis (Mod to Severe); Short breath (Mod to Severe); Angioedema (Mod to Severe))    Intolerances        HPI:  73F w HFrEF (EF 30%), mod AS, known LBBB, HTN, IDDM1, CKD, hypothyroidism, chronic lymphedema, suspected OHS/LELIA on 3L NC home O2 p/w 1 episode of syncope. Recent admission [facility] (3/29-4/5) for ADHF and DUNCAN s/p diuresis, discharged with new home O2 3L NC suspecting OHS/LELIA pending outpatient w/u. Since discharge a week ago, she has been staying at home with   Pt had sob walking to car. Once in car, she was still breathing heavily. Partner noticed pt was not responding to verbal stimuli for 10-15sec and witnessed R arm jerking. Pt gained consciousness quickly without postictal symptoms. Pt went to Cardiologist Dr. Nathan who recommended pt to come to ED. No fever, cp, abd pain, n/v. Leg swelling is improved from prior. Pt on torsemide 40mg which she has been taking. Pt was discharged on 3LNC which she is currently on. Patient reports she did not take Farxiga that she was discharged on as she was concerned about side effects.     In ED, patient was found afebrile, hemodynamically stable, breathing well on 3L NC. Initial labs notable for mild hyponatremia, DUNCAN on CKD, elevated proBNP and elevated pCO2 both improved from prior. (12 Apr 2024 16:31)      PAST MEDICAL & SURGICAL HISTORY:  Hypertension      Diabetes      Lymphedema      H/O left bundle branch block      History of left bundle branch block (LBBB)      Systolic heart failure, chronic      Type 1 diabetes      H/O cataract  2020      History of surgical removal of meniscus of knee  left in 1971      Frozen shoulder  2000      H/O Achilles tendon repair  lengthened bilaterally, 2000      Fractured skull  1968          MEDICATIONS  (STANDING):  ascorbic acid 500 milliGRAM(s) Oral daily  aspirin  chewable 81 milliGRAM(s) Oral daily  atorvastatin 80 milliGRAM(s) Oral at bedtime  calamine/zinc oxide Lotion 1 Application(s) Topical three times a day  cetirizine 10 milliGRAM(s) Oral daily  chlorhexidine 4% Liquid 1 Application(s) Topical <User Schedule>  chlorhexidine 4% Liquid 1 Application(s) Topical once  dextrose 10% Bolus 125 milliLiter(s) IV Bolus once  dextrose 5%. 1000 milliLiter(s) (100 mL/Hr) IV Continuous <Continuous>  dextrose 5%. 1000 milliLiter(s) (50 mL/Hr) IV Continuous <Continuous>  dextrose 50% Injectable 12.5 Gram(s) IV Push once  dextrose 50% Injectable 25 Gram(s) IV Push once  ferrous    sulfate 325 milliGRAM(s) Oral two times a day  glucagon  Injectable 1 milliGRAM(s) IntraMuscular once  heparin   Injectable 5000 Unit(s) SubCutaneous every 8 hours  insulin glargine Injectable (LANTUS) 18 Unit(s) SubCutaneous at bedtime  insulin lispro (ADMELOG) corrective regimen sliding scale   SubCutaneous three times a day before meals  insulin lispro (ADMELOG) corrective regimen sliding scale   SubCutaneous at bedtime  insulin lispro Injectable (ADMELOG) 4 Unit(s) SubCutaneous three times a day before meals  levothyroxine 75 MICROGram(s) Oral daily  multivitamin 1 Tablet(s) Oral daily  nystatin    Suspension 647611 Unit(s) Oral four times a day  nystatin/triamcinolone Cream 1 Application(s) Topical every 12 hours  petrolatum white Ointment 1 Application(s) Topical three times a day  senna 2 Tablet(s) Oral at bedtime  triamcinolone 0.1% Ointment 1 Application(s) Topical two times a day  vancomycin  IVPB 1000 milliGRAM(s) IV Intermittent once    MEDICATIONS  (PRN):  dextrose Oral Gel 15 Gram(s) Oral once PRN Blood Glucose LESS THAN 70 milliGRAM(s)/deciliter  metoclopramide Injectable 10 milliGRAM(s) IV Push every 6 hours PRN nausea vomiting  polyethylene glycol 3350 17 Gram(s) Oral two times a day PRN Constipation  sodium chloride 0.9% lock flush 10 milliLiter(s) IV Push every 1 hour PRN Pre/post blood products, medications, blood draw, and to maintain line patency        Labs:                        10.3   9.97  )-----------( 155      ( 23 Apr 2024 06:56 )             34.3     04-23    135  |  95<L>  |  49<H>  ----------------------------<  107<H>  4.1   |  28  |  4.09<H>    Ca    8.3<L>      23 Apr 2024 06:53  Phos  2.8     04-23  Mg     2.1     04-23    TPro  5.9<L>  /  Alb  2.9<L>  /  TBili  0.6  /  DBili  x   /  AST  10  /  ALT  9<L>  /  AlkPhos  53  04-23    PT/INR - ( 22 Apr 2024 05:02 )   PT: 12.4 sec;   INR: 1.13 ratio         PTT - ( 22 Apr 2024 05:02 )  PTT:26.0 sec    Blood Type: ABO Interpretation: O (04-23 @ 06:25)    HGB A1C: 7.4  Thyroid Panel: 5.06  MRSA:  / MSSA:   Urinalysis Basic - ( 23 Apr 2024 06:53 )    Color: x / Appearance: x / SG: x / pH: x  Gluc: 107 mg/dL / Ketone: x  / Bili: x / Urobili: x   Blood: x / Protein: x / Nitrite: x   Leuk Esterase: x / RBC: x / WBC x   Sq Epi: x / Non Sq Epi: x / Bacteria: x        CXR: < from: Xray Chest 1 View AP/PA (04.17.24 @ 12:32) >  he heart is enlarged.  Mild pulmonary edema. No focal consolidation.  There is no pneumothorax. Trace left pleural effusion.  No acute bony abnormality.    IMPRESSION:  No focal consolidation.    < end of copied text >  < from: CT Angio Coronary TAVR DENIA w/ IV Cont (04.15.24 @ 15:30) >  Suboptimal image quality.    Severely calcified (Agatston calcium score 2453) suspected bicuspid   aortic valve with a calcified raphe between the left and the right   coronary cusps.    Aortic and peripheral access vessel measurements as reported above.   Accuracy of reported measurements may be decreased due to suboptimal   image quality.    < end of copied text >  < from: TTE W or WO Ultrasound Enhancing Agent (04.16.24 @ 15:35) >  Aortic Valve:  There is severe aortic stenosis. The peak transaortic velocity is 4.29 m/s, peak transaortic gradient is 73.6 mmHg and mean transaortic gradient is 43.0 mmHg with an LVOT/aortic valve VTI ratio of 0.21. The aortic valve area is estimated at 0.93 cm² by the continuity equation. There is trace aortic regurgitation.    < end of copied text >      EKG:< from: 12 Lead ECG (04.20.24 @ 03:19) >    Ventricular Rate 111 BPM    QRS Duration 166 ms    Q-T Interval 390 ms    QTC Calculation(Bazett) 530 ms    R Axis -70 degrees    T Axis 116 degrees    Diagnosis Line PROBABLY SINUS TACHYCARDIA  LEFT AXIS DEVIATION  LEFT BUNDLE BRANCH BLOCK  ABNORMAL ECG  WHEN COMPARED WITH ECG OF 18-APR-2024 06:37, (UNCONFIRMED)  SIGNIFICANT CHANGES HAVE OCCURRED  Confirmed by MD MARY KAY, LOREE ([id number]) on 4/20/2024 11:33:05 AM    <    Gen: WN/WD decondtioned - has not ambukated since admission  Neuro: AAOx3, nonfocal  Pulm: CTA B/L  CV: RRR, S1S2 3/6  Abd:  obese large panus Soft, NT, ND +BS  Ext: warm well perfused equal strength   RT ACW Cath   erythema - dye allergy   B/lle lymphedema chronic ace wrapped    + peripheral pulses  skin -erythema - flaking     groins with nystatin powder      Pt has AICD/PPM [ ] Yes  [ ] No             Brand Name:  Pre-op Beta Blocker ordered within 24 hrs of surgery ([facility]ABG ONLY)?  [ ] Yes  [ ] No  If not, Why?TAVR  Type & Cross  [x ] Yes  [ ] No  NPO after Midnight [ x] Yes  [ ] No  Pre-op ABX ordered, to be taped on chart:  x[ ] Yes  [ ] No     Hibiclens/Peridex ordered [x ] Yes  [ ] No  Intraop on Hold: PRBCs, CXR, VERONICA [x ]   Consent obtained  [x ] Yes  [ ] No  PLan   NPO at midnight   premedicate with prednisone 50 mg po12 hrs  7 hrs and 1 hr prior to TAVR  benadryl  50 mg IV one hour prior to tavr  Hibiclens shower tonight and am   Peridex rinse and spit in am   Vancomycin on call to OR taped to chart  TAVR 4/24 Cardiac Surgery Pre-op Note:    CC: Patient is a 73y old  Female who presents with a chief complaint of Syncope  found to  have Aortic Stenosis  now TAVR  4/24 with Dr Mcknight  and Dr Martell                                                                                                             Surgeon: Dr Mcknight    Procedure: 4/24 TAVR    Allergies    contrast media (iodine-based) (Fever; Vomiting; Flushing; Hypotension; Rash; Stomach Upset)  Ativan (Rash; Urticaria)  penicillins (Hives (Mod to Severe); Anaphylaxis (Mod to Severe); Short breath (Mod to Severe); Angioedema (Mod to Severe))    Intolerances        HPI:  73F w HFrEF (EF 30%), mod AS, known LBBB, HTN, IDDM1, CKD, hypothyroidism, chronic lymphedema, suspected OHS/LELIA on 3L NC home O2 p/w 1 episode of syncope. Recent admission at [facility] (3/29-4/5) for ADHF and DUNCAN s/p diuresis, discharged with new home O2 3L NC suspecting OHS/LELIA pending outpatient w/u. Since discharge a week ago, she has been staying at home with   Pt had sob walking to car. Once in car, she was still breathing heavily. Partner noticed pt was not responding to verbal stimuli for 10-15sec and witnessed R arm jerking. Pt gained consciousness quickly without postictal symptoms. Pt went to Cardiologist Dr. Nathan who recommended pt to come to ED. No fever, cp, abd pain, n/v. Leg swelling is improved from prior. Pt on torsemide 40mg which she has been taking. Pt was discharged on 3LNC which she is currently on. Patient reports she did not take Farxiga that she was discharged on as she was concerned about side effects.     In ED, patient was found afebrile, hemodynamically stable, breathing well on 3L NC. Initial labs notable for mild hyponatremia, DUNCAN on CKD, elevated proBNP and elevated pCO2 both improved from prior. (12 Apr 2024 16:31)      PAST MEDICAL & SURGICAL HISTORY:  Hypertension      Diabetes      Lymphedema      H/O left bundle branch block      History of left bundle branch block (LBBB)      Systolic heart failure, chronic      Type 1 diabetes      H/O cataract  2020      History of surgical removal of meniscus of knee  left in 1971      Frozen shoulder  2000      H/O Achilles tendon repair  lengthened bilaterally, 2000      Fractured skull  1968          MEDICATIONS  (STANDING):  ascorbic acid 500 milliGRAM(s) Oral daily  aspirin  chewable 81 milliGRAM(s) Oral daily  atorvastatin 80 milliGRAM(s) Oral at bedtime  calamine/zinc oxide Lotion 1 Application(s) Topical three times a day  cetirizine 10 milliGRAM(s) Oral daily  chlorhexidine 4% Liquid 1 Application(s) Topical <User Schedule>  chlorhexidine 4% Liquid 1 Application(s) Topical once  dextrose 10% Bolus 125 milliLiter(s) IV Bolus once  dextrose 5%. 1000 milliLiter(s) (100 mL/Hr) IV Continuous <Continuous>  dextrose 5%. 1000 milliLiter(s) (50 mL/Hr) IV Continuous <Continuous>  dextrose 50% Injectable 12.5 Gram(s) IV Push once  dextrose 50% Injectable 25 Gram(s) IV Push once  ferrous    sulfate 325 milliGRAM(s) Oral two times a day  glucagon  Injectable 1 milliGRAM(s) IntraMuscular once  heparin   Injectable 5000 Unit(s) SubCutaneous every 8 hours  insulin glargine Injectable (LANTUS) 18 Unit(s) SubCutaneous at bedtime  insulin lispro (ADMELOG) corrective regimen sliding scale   SubCutaneous three times a day before meals  insulin lispro (ADMELOG) corrective regimen sliding scale   SubCutaneous at bedtime  insulin lispro Injectable (ADMELOG) 4 Unit(s) SubCutaneous three times a day before meals  levothyroxine 75 MICROGram(s) Oral daily  multivitamin 1 Tablet(s) Oral daily  nystatin    Suspension 114828 Unit(s) Oral four times a day  nystatin/triamcinolone Cream 1 Application(s) Topical every 12 hours  petrolatum white Ointment 1 Application(s) Topical three times a day  senna 2 Tablet(s) Oral at bedtime  triamcinolone 0.1% Ointment 1 Application(s) Topical two times a day  vancomycin  IVPB 1000 milliGRAM(s) IV Intermittent once    MEDICATIONS  (PRN):  dextrose Oral Gel 15 Gram(s) Oral once PRN Blood Glucose LESS THAN 70 milliGRAM(s)/deciliter  metoclopramide Injectable 10 milliGRAM(s) IV Push every 6 hours PRN nausea vomiting  polyethylene glycol 3350 17 Gram(s) Oral two times a day PRN Constipation  sodium chloride 0.9% lock flush 10 milliLiter(s) IV Push every 1 hour PRN Pre/post blood products, medications, blood draw, and to maintain line patency        Labs:                        10.3   9.97  )-----------( 155      ( 23 Apr 2024 06:56 )             34.3     04-23    135  |  95<L>  |  49<H>  ----------------------------<  107<H>  4.1   |  28  |  4.09<H>    Ca    8.3<L>      23 Apr 2024 06:53  Phos  2.8     04-23  Mg     2.1     04-23    TPro  5.9<L>  /  Alb  2.9<L>  /  TBili  0.6  /  DBili  x   /  AST  10  /  ALT  9<L>  /  AlkPhos  53  04-23    PT/INR - ( 22 Apr 2024 05:02 )   PT: 12.4 sec;   INR: 1.13 ratio         PTT - ( 22 Apr 2024 05:02 )  PTT:26.0 sec    Blood Type: ABO Interpretation: O (04-23 @ 06:25)    HGB A1C: 7.4  Thyroid Panel: 5.06  MRSA:  / MSSA:   Urinalysis Basic - ( 23 Apr 2024 06:53 )    Color: x / Appearance: x / SG: x / pH: x  Gluc: 107 mg/dL / Ketone: x  / Bili: x / Urobili: x   Blood: x / Protein: x / Nitrite: x   Leuk Esterase: x / RBC: x / WBC x   Sq Epi: x / Non Sq Epi: x / Bacteria: x        CXR: < from: Xray Chest 1 View AP/PA (04.17.24 @ 12:32) >  he heart is enlarged.  Mild pulmonary edema. No focal consolidation.  There is no pneumothorax. Trace left pleural effusion.  No acute bony abnormality.    IMPRESSION:  No focal consolidation.    < end of copied text >  < from: CT Angio Coronary TAVR DENIA w/ IV Cont (04.15.24 @ 15:30) >  Suboptimal image quality.    Severely calcified (Agatston calcium score 2453) suspected bicuspid   aortic valve with a calcified raphe between the left and the right   coronary cusps.    Aortic and peripheral access vessel measurements as reported above.   Accuracy of reported measurements may be decreased due to suboptimal   image quality.    < end of copied text >  < from: TTE W or WO Ultrasound Enhancing Agent (04.16.24 @ 15:35) >  Aortic Valve:  There is severe aortic stenosis. The peak transaortic velocity is 4.29 m/s, peak transaortic gradient is 73.6 mmHg and mean transaortic gradient is 43.0 mmHg with an LVOT/aortic valve VTI ratio of 0.21. The aortic valve area is estimated at 0.93 cm² by the continuity equation. There is trace aortic regurgitation.    < end of copied text >      EKG:< from: 12 Lead ECG (04.20.24 @ 03:19) >    Ventricular Rate 111 BPM    QRS Duration 166 ms    Q-T Interval 390 ms    QTC Calculation(Bazett) 530 ms    R Axis -70 degrees    T Axis 116 degrees    Diagnosis Line PROBABLY SINUS TACHYCARDIA  LEFT AXIS DEVIATION  LEFT BUNDLE BRANCH BLOCK  ABNORMAL ECG  WHEN COMPARED WITH ECG OF 18-APR-2024 06:37, (UNCONFIRMED)  SIGNIFICANT CHANGES HAVE OCCURRED  Confirmed by MD MARY KAY, LOREE ([id number]) on 4/20/2024 11:33:05 AM    <    Gen: WN/WD decondtioned - has not ambukated since admission  Neuro: AAOx3, nonfocal  Pulm: CTA B/L  CV: RRR, S1S2 3/6  Abd:  obese large panus Soft, NT, ND +BS  Ext: warm well perfused equal strength   RT ACW Cath   erythema - dye allergy   B/lle lymphedema chronic ace wrapped    + peripheral pulses  skin -erythema - flaking     groins with nystatin powder      Pt has AICD/PPM [ ] Yes  [ ] No             Brand Name:  Pre-op Beta Blocker ordered within 24 hrs of surgery (CABG ONLY)?  [ ] Yes  [ ] No  If not, Why?TAVR  Type & Cross  [x ] Yes  [ ] No  NPO after Midnight [ x] Yes  [ ] No  Pre-op ABX ordered, to be taped on chart:  x[ ] Yes  [ ] No     Hibiclens/Peridex ordered [x ] Yes  [ ] No  Intraop on Hold: PRBCs, CXR, VERONICA [x ]   Consent obtained  [x ] Yes  [ ] No  PLan   NPO at midnight   premedicate with prednisone 50 mg po12 hrs  7 hrs and 1 hr prior to TAVR  benadryl  50 mg IV one hour prior to tavr  Hibiclens shower tonight and am   Peridex rinse and spit in am   Subacute rehab for discharge per PT  Vancomycin on call to OR taped to chart  TAVR 4/24

## 2024-04-23 NOTE — PROGRESS NOTE ADULT - NUTRITIONAL ASSESSMENT
This patient has been assessed with a concern for Malnutrition and has been determined to have a diagnosis/diagnoses of Morbid obesity (BMI > 40).    This patient is being managed with:   Diet NPO after Midnight-     NPO Start Date: 23-Apr-2024   NPO Start Time: 23:59  Except Medications     Special Instructions for Nursing:  Except Medications  Entered: Apr 23 2024  6:26AM    Diet Regular-  Consistent Carbohydrate {Evening Snack} (CSTCHOSN)  DASH/TLC {Sodium & Cholesterol Restricted} (DASH)  1500mL Fluid Restriction ([id number])  Entered: Apr 19 2024  7:44AM

## 2024-04-23 NOTE — PROGRESS NOTE ADULT - SUBJECTIVE AND OBJECTIVE BOX
[facility]   [phone]  RENAL FOLLOW UP NOTE  --------------------------------------------------------------------------------  Chief Complaint:    24 hour events/subjective:    seen earlier   denies worsening sob     PAST HISTORY  --------------------------------------------------------------------------------  No significant changes to PMH, PSH, FHx, SHx, unless otherwise noted    ALLERGIES & MEDICATIONS  --------------------------------------------------------------------------------  Allergies    contrast media (iodine-based) (Fever; Vomiting; Flushing; Hypotension; Rash; Stomach Upset)  Ativan (Rash; Urticaria)  penicillins (Hives (Mod to Severe); Anaphylaxis (Mod to Severe); Short breath (Mod to Severe); Angioedema (Mod to Severe))    Intolerances      Standing Inpatient Medications  ascorbic acid 500 milliGRAM(s) Oral daily  aspirin  chewable 81 milliGRAM(s) Oral daily  atorvastatin 80 milliGRAM(s) Oral at bedtime  calamine/zinc oxide Lotion 1 Application(s) Topical three times a day  cetirizine 10 milliGRAM(s) Oral daily  chlorhexidine 4% Liquid 1 Application(s) Topical once  chlorhexidine 4% Liquid 1 Application(s) Topical <User Schedule>  dextrose 10% Bolus 125 milliLiter(s) IV Bolus once  dextrose 5%. 1000 milliLiter(s) IV Continuous <Continuous>  dextrose 5%. 1000 milliLiter(s) IV Continuous <Continuous>  dextrose 50% Injectable 12.5 Gram(s) IV Push once  dextrose 50% Injectable 25 Gram(s) IV Push once  ferrous    sulfate 325 milliGRAM(s) Oral two times a day  glucagon  Injectable 1 milliGRAM(s) IntraMuscular once  heparin   Injectable 5000 Unit(s) SubCutaneous every 8 hours  insulin glargine Injectable (LANTUS) 18 Unit(s) SubCutaneous at bedtime  insulin lispro (ADMELOG) corrective regimen sliding scale   SubCutaneous at bedtime  insulin lispro (ADMELOG) corrective regimen sliding scale   SubCutaneous three times a day before meals  insulin lispro Injectable (ADMELOG) 4 Unit(s) SubCutaneous three times a day before meals  levothyroxine 75 MICROGram(s) Oral daily  multivitamin 1 Tablet(s) Oral daily  nystatin    Suspension 158557 Unit(s) Oral four times a day  nystatin/triamcinolone Cream 1 Application(s) Topical every 12 hours  petrolatum white Ointment 1 Application(s) Topical three times a day  predniSONE   Tablet 50 milliGRAM(s) Oral <User Schedule>  senna 2 Tablet(s) Oral at bedtime  triamcinolone 0.1% Ointment 1 Application(s) Topical two times a day  vancomycin  IVPB 1000 milliGRAM(s) IV Intermittent once    PRN Inpatient Medications  dextrose Oral Gel 15 Gram(s) Oral once PRN  metoclopramide Injectable 10 milliGRAM(s) IV Push every 6 hours PRN  polyethylene glycol 3350 17 Gram(s) Oral two times a day PRN  sodium chloride 0.9% lock flush 10 milliLiter(s) IV Push every 1 hour PRN      REVIEW OF SYSTEMS  --------------------------------------------------------------------------------    Gen: denies  fevers/chills,  CVS: denies chest pain/palpitations  Resp: denies worsening SOB/Cough  GI: Denies N/V/Abd pain  : Denies dysuria    VITALS/PHYSICAL EXAM  --------------------------------------------------------------------------------  T(C): 36.5 (04-23-24 @ 18:00), Max: 37.1 (04-22-24 @ 22:16)  HR: 90 (04-23-24 @ 18:00) (77 - 97)  BP: 95/57 (04-23-24 @ 18:00) (92/53 - 107/66)  RR: 16 (04-23-24 @ 18:00) (16 - 18)  SpO2: 100% (04-23-24 @ 18:00) (93% - 100%)  Wt(kg): --        04-22-24 @ 07:01  -  04-23-24 @ 07:00  --------------------------------------------------------  IN: 1120 mL / OUT: 2220 mL / NET: -1100 mL    04-23-24 @ 07:01  -  04-23-24 @ 19:42  --------------------------------------------------------  IN: 870 mL / OUT: 120 mL / NET: 750 mL      Physical Exam:  	      Gen:  on RA, facial erythema, neck and arm erythema,   	Pulm: decrease bs  no rales or ronchi or wheezing  	CV: No JVD. RRR, S1S2; no rub II/VI M   	Abd: +BS, soft, nontender/nondistended, obese   	: No suprapubic tenderness  	UE: Warm, no cyanosis  no clubbing,  no edema  	LE: Warm, no cyanosis  no clubbing, 4+ edema, B/L LE raised red plaque   	Neuro: alert and oriented. speech coherent               Vascular Access: RIJ non tunneled HD catheter      LABS/STUDIES  --------------------------------------------------------------------------------              10.3   9.97  >-----------<  155      [04-23-24 @ 06:56]              34.3     135  |  95  |  49  ----------------------------<  107      [04-23-24 @ 06:53]  4.1   |  28  |  4.09        Ca     8.3     [04-23-24 @ 06:53]      Mg     2.1     [04-23-24 @ 06:53]      Phos  2.8     [04-23-24 @ 06:53]    TPro  5.9  /  Alb  2.9  /  TBili  0.6  /  DBili  x   /  AST  10  /  ALT  9   /  AlkPhos  53  [04-23-24 @ 06:53]    PT/INR: PT 12.4 , INR 1.13       [04-22-24 @ 05:02]  PTT: 26.0       [04-22-24 @ 05:02]      Creatinine Trend:  SCr 4.09 [04-23 @ 06:53]  SCr 4.75 [04-22 @ 05:02]  SCr 4.02 [04-21 @ 11:38]  SCr 4.14 [04-20 @ 08:36]  SCr 4.35 [04-19 @ 06:45]    PTH -- (Ca 8.4)      [04-19-24 @ 17:54]   109  TSH 5.06      [04-14-24 @ 07:18]  Lipid: chol 74, TG 70, HDL 33, LDL --      [04-13-24 @ 07:05]

## 2024-04-23 NOTE — PROGRESS NOTE ADULT - ASSESSMENT
73F w HFrEF (EF 30%), mod AS, known LBBB, HTN, IDDM1, CKD, hypothyroidism, chronic lymphedema, suspected OHS/LELIA on 3L NC home O2 p/w 1 episode of syncope. Recent admission at [facility] (3/29-4/5) for ADHF and DUNCAN s/p diuresis, discharged with new home O2 3L NC suspecting OHS/LELIA pending outpatient w/u. Since discharge a week ago, she has been staying at home with   Pt had sob walking to car. Once in car, she was still breathing heavily. Partner noticed pt was not responding to verbal stimuli for 10-15sec and witnessed R arm jerking. Pt gained consciousness quickly without postictal symptoms. Pt went to Cardiologist Dr. Nathan who recommended pt to come to ED. No fever, cp, abd pain, n/v. Leg swelling is improved from prior. Pt on torsemide 40mg which she has been taking. Pt was discharged on 3LNC which she is currently on. Patient reports she did not take Farxiga that she was discharged on as she was concerned about side effects.     In ED, patient was found afebrile, hemodynamically stable, breathing well on 3L NC. Initial labs notable for mild hyponatremia, DUNCAN on CKD, elevated proBNP and elevated pCO2 both improved from prior.  pt also noticed with abn creatinine      1- CKD IV  with DUNCAN   2- CHF   3- lymphedema   4- severe AS   5- syncope         suspect ATN from hypotension along with contrast nephropathy   creatinine worsening requiring renal replacement therapy, HD initiated 4/18   HD   dialyzer F 160  180 min 2 k bath bfr 300 dfr 500 1.2  liter fluid removal   will need to have temp hd cath changed to tunnel cath after TAVR   k in range  ID following, no abx at this time  EP consulted for syncope, recommending device placement for bradycardia postponed  structural heart team following, TAVR work up   derm following for rash  cont O2 and tele monitor   trend creatinine and electrolytes daily

## 2024-04-23 NOTE — PROGRESS NOTE ADULT - PROBLEM SELECTOR PLAN 4
RRT 4/16 for sepsis rectal temp 102, hypotension to systolic 70s, no leukocytosis, with new onset vomiting and rash  likely 2/2 delayed contrast rxn  -Derm Bx consistent with AGEP  -much improved    Plan:  -C/w Zyrtec  -Will premedicate with steroids prior to TAVR on 5/1 RRT 4/16 for sepsis rectal temp 102, hypotension to systolic 70s, no leukocytosis, with new onset vomiting and rash  likely 2/2 delayed contrast rxn  -Derm Bx consistent with AGEP  -much improved    Plan:  -C/w Zyrtec  -Will premedicate with benadryl steroids prior to TAVR on 5/1 RRT 4/16 for sepsis rectal temp 102, hypotension to systolic 70s, no leukocytosis, with new onset vomiting and rash  likely 2/2 delayed contrast rxn  -Derm Bx consistent with AGEP  -much improved    Plan:  -C/w Zyrtec  -Will premedicate with benadryl steroids prior to TAVR

## 2024-04-23 NOTE — PROGRESS NOTE ADULT - ASSESSMENT
73F w HFrEF (EF 30%), mod AS, known LBBB, HTN, IDDM1, CKD, hypothyroidism, chronic lymphedema, p/w 1 episode of syncope with R arm jerking.     #Syncope-Aortic Stenosis  - Episode after exertion  - Known Aortic Stenosis likely Severe in setting of decreased LVEF  - TAVR likely late next week     #Chronic Systolic HF (EF 30%), mod to severe AS, HTN, LBBB, Lymphedema   - Has known systolic HF and AS.    - Repeat TTE showed EF 30%, mild-mod LVH, mildly reduced RVF, mod-severe AS (.61 cmsq), mod pHTN  - Had cath 2022-Non obstructive CAD  - On home Torsemide 20 mg daily, Eplerenone 25 mg daily, and Toprol  mg daily  - Compliant with all meds including Torsemide, though, dose was reduced to Furosemide 40mg once daily.   - proBNP 51K from 80k.   - Consideration for TAVR  - Was on bumex gtt but then overnight 4/15 developed a fever to 102F and became hypotensive overnight. Bumex gtt stopped, given 250cc bolus. BPs remain low. UOP has dropped. I suspect she is intravascularly depleted.   - As per ID team, fever though to be 2/2 allergic rxn to contrast, rec to hold abx    # Acute kidney injury superimposed on CKD.   - now with worsening Cr  - HD initiated-tolerating  - vol removal with HD     #LBBB  Known LBBB  Noted intermittent Wenkebach on telemetry with bradycardia and 2:1 AV conduction  - EP has been consulted. For MicraPPM  today          Will continue to follow closely.

## 2024-04-23 NOTE — VITALS
Monitor: The problem is present again.  Evaluation: No labs/tests required today.  Assessment/Treatment: steroid cream prescribed,  Continue current treatment/monitoring regimen.   [] : No [de-identified] : 0/10

## 2024-04-23 NOTE — PROGRESS NOTE ADULT - ASSESSMENT
72 y/o F with PMHx of HFrEF (EF 30%), mod AS, known 1st degree AVB and LBBB, HTN, IDDM1, CKD, hypothyroidism, chronic lymphedema, suspected OHS/LELIA on 3L NC home O2 p/w 1 episode of syncope. Recent admission at [facility] (3/29-4/5) for ADHF and DUNCAN s/p diuresis.   Pt states she does not remember her syncopal episode. She remembers walking to the car and getting into it. She denies any preceding symptoms. She was told that she "blacked out" and was not responding for 10-15 seconds with arm shaking. EKG consistent with sinus bradycardia at 58bpm, 1st degree AVB (AUDRA 318ms) with LBBB (QRSd 176). Early AM 4/14, she was noted to have 2:1 AV block. Pt also with e/o Wenckebach on 4/14.  She was on Toprol XL 100mg at home but has not taken it since 4/12.    Tele review overnight/this AM: SR/ST 80's - 110bpm    1) Syncope  2) 2:1 AV block with known 1st degree AVB (Audra 318ms) with LBBB (QRSd 176)  3) HFrEF with LVEF 30%  4) Aortic stenosis, severe   5) Lymphedema with significant LE edema and rash    Recommendations:  - NPO s/p mn for TAVR tomorrow with possibility of Micra (leadless) PPM implant at time of TAVR should patient have evidence of high grade AVB intra operatively.  Ultimately patient may warrant CRT-D placement noting conduction disease and cardiomyopathy with LVEF 30% and LBBB with QRSd 176. However, at this time she is not a candidate for CRT at this time noting infectious risk due to LE swelling/cellulitis and ESRD on HD via temporary HDC.   - Premedication iso contrast allergy prior to TAVR  - ID following for potential infectious etiology of fever. Likely allergic reaction to contrast. Off antibiotics. No further fevers noted  - BB on hold

## 2024-04-23 NOTE — PROGRESS NOTE ADULT - PROBLEM SELECTOR PLAN 10
Hospital Bundle    Fluids: FLUID RESTRICT 1.5L/day, NPO for procedure  Electrolytes: Replete K > 4, Mg > 2, Phos > 3  Nutrition: Diet: CC   PPX  ---VTE: HSQ, held for procedure  ---Resp: c/w home O2  Code Status: full code  Dispo: pending clinical improvement Hospital Bundle    Fluids: FLUID RESTRICT 1.5L/day, NPO after MN for procedure  Electrolytes: Replete K > 4, Mg > 2, Phos > 3  Nutrition: Diet: CC   PPX  ---VTE: HSQ, hold in the AM for procedure  ---Resp: c/w home O2  Code Status: full code  Dispo: pending clinical improvement

## 2024-04-23 NOTE — PROGRESS NOTE ADULT - PROBLEM SELECTOR PLAN 3
Unclear baseline SCr likely 2.1-2.2  Now with increasing Cr. and anuric    Plan:  - trend BUN/SCr, avoid nephrotoxic, renally dose all meds  - strict I/Os  - hardy placed for accurate I/O and AUR, maintain for now  - s/p multiple HD sessions for volume optimization pre procedure Unclear baseline SCr likely 2.1-2.2  - s/p multiple HD sessions  -Minimal urine output     Plan:  - trend BUN/SCr, avoid nephrotoxic, renally dose all meds  - strict I/Os  - hardy placed for accurate I/O and AUR, maintain for now  - Will likely take out hardy post TAVR

## 2024-04-23 NOTE — PROGRESS NOTE ADULT - TIME BILLING
- Review of records, telemetry, vital signs and daily labs.   - General and cardiovascular physical examination.  - Generation of cardiovascular treatment plan.  - Coordination of care.      Patient was seen and examined by me on  04/23/2024,interim events noted,labs and radiology studies reviewed.  Cuco Tubbs MD,FACC.  [address] [place], [place] [id number].  [phone]

## 2024-04-23 NOTE — PROGRESS NOTE ADULT - ATTENDING COMMENTS
73 year old female with PMH heart failure with reduced ejection fraction, AS, HTN, DM1 and hypothyroidism, who presented with syncope likely due to AS. While undergoing CT images for TAVR workup, she had an erythrodermic drug eruption likely due to the contrast. Allergy and dermatology have been consulted, recs appreciated. Will follow up derm biopsy results.     - plan for TAVR tomorrow. Pre-medications for IV contrast ordered as per protocol  - appreciate EP recs: Micra implantation deferred at this time unless found to have high grade AVB intraoperatively during TAVR tomorrow  - continue HD session via [facility], and likely will need permacath upon discharge  - OOB to chair, PT recommends HONEY Dennison MD  Division of Hospital Medicine  Contact via [employer] Teams  Office: [phone]    I have personally and independently provided 51 minutes in patient encounter, reviewing tests and imaging, independently obtaining a history, performing a physical examination, discussing the plan with the patient, ordering medications/tests, documenting clinical information, and coordinating care. This excludes any time spent on teaching.

## 2024-04-23 NOTE — PROGRESS NOTE ADULT - ASSESSMENT
73F w HFrEF (EF 30%), mod AS, known LBBB, HTN, IDDM1, CKD, hypothyroidism, chronic lymphedema, p/w 1 episode of syncope with R arm jerking, likely 2/2 severe symptomatic AS. CTA imagings performed for TAVR eval. C/b febrile episode with hypotension, likely allergic contrast reaction vs infectious, pending workup. C/b contrast-related renal failure requiring temporary HD. Plan for AV Micra 4/23 prior to TAVR.      73F w HFrEF (EF 30%), mod AS, known LBBB, HTN, IDDM1, CKD, hypothyroidism, chronic lymphedema, p/w 1 episode of syncope with R arm jerking, likely 2/2 severe symptomatic AS. CTA imagings performed for TAVR eval. C/b febrile episode with hypotension, likely allergic contrast reaction vs infectious, pending workup. C/b contrast-related renal failure requiring temporary HD. Plan for TAVR 4/24.

## 2024-04-23 NOTE — PROGRESS NOTE ADULT - NUTRITIONAL ASSESSMENT
This patient has been assessed with a concern for Malnutrition and has been determined to have a diagnosis/diagnoses of Morbid obesity (BMI > 40).    This patient is being managed with:   Diet NPO-  NPO for Procedure/Test     NPO Start Date: 23-Apr-2024   NPO Start Time: 06:00  Except Medications  Entered: Apr 23 2024  7:51AM    Diet NPO after Midnight-     NPO Start Date: 23-Apr-2024   NPO Start Time: 23:59  Except Medications     Special Instructions for Nursing:  Except Medications  Entered: Apr 23 2024  6:26AM    Diet Regular-  Consistent Carbohydrate {Evening Snack} (CSTCHOSN)  DASH/TLC {Sodium & Cholesterol Restricted} (DASH)  1500mL Fluid Restriction ([id number])  Entered: Apr 19 2024  7:44AM

## 2024-04-23 NOTE — PROGRESS NOTE ADULT - PROBLEM SELECTOR PLAN 5
CTA CAP incidentally noted for L adrenal thickened w a 1.2 x 0.8 cm left adrenal nodule is seen.  - 1mg IV dexamethasone administered 4/17 10PM, f/u 8AM serum cortisol wnl    Plan:    - endo consulted; recs appreciated  - f/u adrenal hormones testing  - pending CT adrenal protocol w/o contrast to eval for adrenal causes of sepsis

## 2024-04-23 NOTE — PROGRESS NOTE ADULT - PROBLEM SELECTOR PLAN 1
syncope 2/2 mod to severe AS     Plan:  -structural cards following - CTA imagings performed for TAVR  -Plan for AV micra 4/23 prior to TAVR per EP due to conduction disease  -CTM on telemetry syncope 2/2 mod to severe AS     Plan:  -Plan for snorkel TAVR 4/24 at 11:30AM,  -Prior to TAVR: give Plavix 600mg 4/23 and start plavix 75mg 4/24, c/w ASA 81 daily.  -PPM at the time of TAVR if she goes into a block, not ideal for Micra given low EF and LBBB and nonselective RV pacing  -For contrast allergy: premedicate with benadryl and steroids.  -CTM on telemetry syncope 2/2 mod to severe AS     Plan:  -Plan for snorkel TAVR 4/24 at 11:30AM,  -Give Plavix 600mg 4/23 and start plavix 75mg 4/24, c/w ASA 81 daily for thrombi PPX for TAVR   -PPM at the time of TAVR if she goes into a block, not ideal for Micra given low EF and LBBB and nonselective RV pacing  -For contrast allergy: premedicate with prednisone 50 po at 12h, 7h, 1h prior to procedure and IV benadryl 50 1hr prior.  -CTM on telemetry

## 2024-04-23 NOTE — PROGRESS NOTE ADULT - SUBJECTIVE AND OBJECTIVE BOX
PROGRESS NOTE:   Authored by Dr. Mireya Hayden MD (PGY-1). Pager [facility] [phone]/ [facility]     Patient is a 73y old  Female who presents with a chief complaint of Syncope (22 Apr 2024 20:36)      SUBJECTIVE / OVERNIGHT EVENTS:  No acute events overnight.     All other ROS neg except as above in HPI    MEDICATIONS  (STANDING):  ascorbic acid 500 milliGRAM(s) Oral daily  aspirin  chewable 81 milliGRAM(s) Oral daily  atorvastatin 80 milliGRAM(s) Oral at bedtime  calamine/zinc oxide Lotion 1 Application(s) Topical three times a day  cetirizine 10 milliGRAM(s) Oral daily  chlorhexidine 4% Liquid 1 Application(s) Topical once  chlorhexidine 4% Liquid 1 Application(s) Topical <User Schedule>  dextrose 10% Bolus 125 milliLiter(s) IV Bolus once  dextrose 5%. 1000 milliLiter(s) (100 mL/Hr) IV Continuous <Continuous>  dextrose 5%. 1000 milliLiter(s) (50 mL/Hr) IV Continuous <Continuous>  dextrose 50% Injectable 12.5 Gram(s) IV Push once  dextrose 50% Injectable 25 Gram(s) IV Push once  ferrous    sulfate 325 milliGRAM(s) Oral two times a day  glucagon  Injectable 1 milliGRAM(s) IntraMuscular once  heparin   Injectable 5000 Unit(s) SubCutaneous every 8 hours  insulin glargine Injectable (LANTUS) 18 Unit(s) SubCutaneous at bedtime  insulin lispro (ADMELOG) corrective regimen sliding scale   SubCutaneous at bedtime  insulin lispro (ADMELOG) corrective regimen sliding scale   SubCutaneous three times a day before meals  insulin lispro Injectable (ADMELOG) 4 Unit(s) SubCutaneous three times a day before meals  levothyroxine 75 MICROGram(s) Oral daily  multivitamin 1 Tablet(s) Oral daily  nystatin    Suspension 574897 Unit(s) Oral four times a day  nystatin/triamcinolone Cream 1 Application(s) Topical every 12 hours  petrolatum white Ointment 1 Application(s) Topical three times a day  senna 2 Tablet(s) Oral at bedtime  triamcinolone 0.1% Ointment 1 Application(s) Topical two times a day  vancomycin  IVPB 1000 milliGRAM(s) IV Intermittent once    MEDICATIONS  (PRN):  dextrose Oral Gel 15 Gram(s) Oral once PRN Blood Glucose LESS THAN 70 milliGRAM(s)/deciliter  metoclopramide Injectable 10 milliGRAM(s) IV Push every 6 hours PRN nausea vomiting  polyethylene glycol 3350 17 Gram(s) Oral two times a day PRN Constipation  sodium chloride 0.9% lock flush 10 milliLiter(s) IV Push every 1 hour PRN Pre/post blood products, medications, blood draw, and to maintain line patency      CAPILLARY BLOOD GLUCOSE      POCT Blood Glucose.: 132 mg/dL (22 Apr 2024 21:47)  POCT Blood Glucose.: 130 mg/dL (22 Apr 2024 18:07)  POCT Blood Glucose.: 171 mg/dL (22 Apr 2024 12:28)  POCT Blood Glucose.: 175 mg/dL (22 Apr 2024 08:33)    I&O's Summary    22 Apr 2024 07:01  -  23 Apr 2024 07:00  --------------------------------------------------------  IN: 1120 mL / OUT: 2220 mL / NET: -1100 mL        PHYSICAL EXAM:  Vital Signs Last 24 Hrs  T(C): 36.7 (23 Apr 2024 04:00), Max: 37.1 (22 Apr 2024 22:16)  T(F): 98.1 (23 Apr 2024 04:00), Max: 98.8 (22 Apr 2024 22:16)  HR: 97 (23 Apr 2024 04:00) (79 - 109)  BP: 107/66 (23 Apr 2024 04:00) (92/53 - 121/71)  BP(mean): --  RR: 18 (23 Apr 2024 04:00) (18 - 19)  SpO2: 98% (23 Apr 2024 04:00) (93% - 100%)    Parameters below as of 23 Apr 2024 04:00  Patient On (Oxygen Delivery Method): nasal cannula  O2 Flow (L/min): 3      CONSTITUTIONAL: NAD, well-developed  HEET: MMM, EOMI, PERRLA  NECK: supple  RESPIRATORY: Normal respiratory effort; lungs are clear to auscultation bilaterally  CARDIOVASCULAR: Regular rate and rhythm, normal S1 and S2, no murmur/rub/gallop; No lower extremity edema; Peripheral pulses are 2+ bilaterally  ABDOMEN: Nontender to palpation, normoactive bowel sounds, no rebound/guarding; No hepatosplenomegaly  MUSCULOSKELETAL: no clubbing or cyanosis of digits; no joint swelling or tenderness to palpation  NEURO: Moving all four extremities, sensation grossly intact  PSYCH: A+O to person, place, and time; affect appropriate  SKIN: No rash    LABS:                        10.3   9.97  )-----------( 155      ( 23 Apr 2024 06:56 )             34.3     04-22    131<L>  |  94<L>  |  63<H>  ----------------------------<  155<H>  5.0   |  23  |  4.75<H>    Ca    8.5      22 Apr 2024 05:02  Phos  3.1     04-22  Mg     2.2     04-22    TPro  6.2  /  Alb  2.9<L>  /  TBili  0.7  /  DBili  x   /  AST  16  /  ALT  5<L>  /  AlkPhos  65  04-22    PT/INR - ( 22 Apr 2024 05:02 )   PT: 12.4 sec;   INR: 1.13 ratio         PTT - ( 22 Apr 2024 05:02 )  PTT:26.0 sec      Urinalysis Basic - ( 22 Apr 2024 05:02 )    Color: x / Appearance: x / SG: x / pH: x  Gluc: 155 mg/dL / Ketone: x  / Bili: x / Urobili: x   Blood: x / Protein: x / Nitrite: x   Leuk Esterase: x / RBC: x / WBC x   Sq Epi: x / Non Sq Epi: x / Bacteria: x           PROGRESS NOTE:   Authored by Dr. Mireya Hayden MD (PGY-1). Pager [facility] [phone]/ [facility]     Patient is a 73y old  Female who presents with a chief complaint of Syncope (22 Apr 2024 20:36)      SUBJECTIVE / OVERNIGHT EVENTS:  No acute events overnight. Feels well.    All other ROS neg except as above in HPI    MEDICATIONS  (STANDING):  ascorbic acid 500 milliGRAM(s) Oral daily  aspirin  chewable 81 milliGRAM(s) Oral daily  atorvastatin 80 milliGRAM(s) Oral at bedtime  calamine/zinc oxide Lotion 1 Application(s) Topical three times a day  cetirizine 10 milliGRAM(s) Oral daily  chlorhexidine 4% Liquid 1 Application(s) Topical once  chlorhexidine 4% Liquid 1 Application(s) Topical <User Schedule>  dextrose 10% Bolus 125 milliLiter(s) IV Bolus once  dextrose 5%. 1000 milliLiter(s) (100 mL/Hr) IV Continuous <Continuous>  dextrose 5%. 1000 milliLiter(s) (50 mL/Hr) IV Continuous <Continuous>  dextrose 50% Injectable 12.5 Gram(s) IV Push once  dextrose 50% Injectable 25 Gram(s) IV Push once  ferrous    sulfate 325 milliGRAM(s) Oral two times a day  glucagon  Injectable 1 milliGRAM(s) IntraMuscular once  heparin   Injectable 5000 Unit(s) SubCutaneous every 8 hours  insulin glargine Injectable (LANTUS) 18 Unit(s) SubCutaneous at bedtime  insulin lispro (ADMELOG) corrective regimen sliding scale   SubCutaneous at bedtime  insulin lispro (ADMELOG) corrective regimen sliding scale   SubCutaneous three times a day before meals  insulin lispro Injectable (ADMELOG) 4 Unit(s) SubCutaneous three times a day before meals  levothyroxine 75 MICROGram(s) Oral daily  multivitamin 1 Tablet(s) Oral daily  nystatin    Suspension [id number] Unit(s) Oral four times a day  nystatin/triamcinolone Cream 1 Application(s) Topical every 12 hours  petrolatum white Ointment 1 Application(s) Topical three times a day  senna 2 Tablet(s) Oral at bedtime  triamcinolone 0.1% Ointment 1 Application(s) Topical two times a day  vancomycin  IVPB 1000 milliGRAM(s) IV Intermittent once    MEDICATIONS  (PRN):  dextrose Oral Gel 15 Gram(s) Oral once PRN Blood Glucose LESS THAN 70 milliGRAM(s)/deciliter  metoclopramide Injectable 10 milliGRAM(s) IV Push every 6 hours PRN nausea vomiting  polyethylene glycol 3350 17 Gram(s) Oral two times a day PRN Constipation  sodium chloride 0.9% lock flush 10 milliLiter(s) IV Push every 1 hour PRN Pre/post blood products, medications, blood draw, and to maintain line patency      CAPILLARY BLOOD GLUCOSE      POCT Blood Glucose.: 132 mg/dL (22 Apr 2024 21:47)  POCT Blood Glucose.: 130 mg/dL (22 Apr 2024 18:07)  POCT Blood Glucose.: 171 mg/dL (22 Apr 2024 12:28)  POCT Blood Glucose.: 175 mg/dL (22 Apr 2024 08:33)    I&O's Summary    22 Apr 2024 07:01  -  23 Apr 2024 07:00  --------------------------------------------------------  IN: 1120 mL / OUT: 2220 mL / NET: -1100 mL        PHYSICAL EXAM:  Vital Signs Last 24 Hrs  T(C): 36.7 (23 Apr 2024 04:00), Max: 37.1 (22 Apr 2024 22:16)  T(F): 98.1 (23 Apr 2024 04:00), Max: 98.8 (22 Apr 2024 22:16)  HR: 97 (23 Apr 2024 04:00) (79 - 109)  BP: 107/66 (23 Apr 2024 04:00) (92/53 - 121/71)  BP(mean): --  RR: 18 (23 Apr 2024 04:00) (18 - 19)  SpO2: 98% (23 Apr 2024 04:00) (93% - 100%)    Parameters below as of 23 Apr 2024 04:00  Patient On (Oxygen Delivery Method): nasal cannula  O2 Flow (L/min): 3        PHYSICAL EXAM:  CONSTITUTIONAL: NAD, grossly erythematous  HEET: MMM, EOMI, PERRLA  NECK: supple  RESPIRATORY: Normal respiratory effort; bibasilar crackles   CARDIOVASCULAR: +punch biopsy on chest, no bleeding. Regular rate and rhythm, normal S1 and S2, systolic murmur, chronic lymphedema   ABDOMEN: Nontender to palpation, normoactive bowel sounds, no rebound/guarding;  MUSCULOSKELETAL: full ROM   NEURO: Moving all four extremities, sensation grossly intact  PSYCH: A+O to person, place, and time; affect appropriate  SKIN: psoriatic lesions on b/l UE and lymphedema rashes on LE. flushed skin, much improved, dry      LABS:                        10.3   9.97  )-----------( 155      ( 23 Apr 2024 06:56 )             34.3     04-22    131<L>  |  94<L>  |  63<H>  ----------------------------<  155<H>  5.0   |  23  |  4.75<H>    Ca    8.5      22 Apr 2024 05:02  Phos  3.1     04-22  Mg     2.2     04-22    TPro  6.2  /  Alb  2.9<L>  /  TBili  0.7  /  DBili  x   /  AST  16  /  ALT  5<L>  /  AlkPhos  65  04-22    PT/INR - ( 22 Apr 2024 05:02 )   PT: 12.4 sec;   INR: 1.13 ratio         PTT - ( 22 Apr 2024 05:02 )  PTT:26.0 sec      Urinalysis Basic - ( 22 Apr 2024 05:02 )    Color: x / Appearance: x / SG: x / pH: x  Gluc: 155 mg/dL / Ketone: x  / Bili: x / Urobili: x   Blood: x / Protein: x / Nitrite: x   Leuk Esterase: x / RBC: x / WBC x   Sq Epi: x / Non Sq Epi: x / Bacteria: x

## 2024-04-24 NOTE — PROGRESS NOTE ADULT - SUBJECTIVE AND OBJECTIVE BOX
PROGRESS NOTE:   Authored by Dr. Mireya Hayden MD (PGY-1). Pager [facility] [phone]/ [facility]     Patient is a 73y old  Female who presents with a chief complaint of Syncope (24 Apr 2024 06:30)      SUBJECTIVE / OVERNIGHT EVENTS:  No acute events overnight.     All other ROS neg except as above in HPI    MEDICATIONS  (STANDING):  ascorbic acid 500 milliGRAM(s) Oral daily  aspirin  chewable 81 milliGRAM(s) Oral daily  atorvastatin 80 milliGRAM(s) Oral at bedtime  calamine/zinc oxide Lotion 1 Application(s) Topical three times a day  cetirizine 10 milliGRAM(s) Oral daily  chlorhexidine 0.12% Liquid 30 milliLiter(s) Swish and Spit once  chlorhexidine 4% Liquid 1 Application(s) Topical <User Schedule>  clopidogrel Tablet 75 milliGRAM(s) Oral daily  dextrose 10% Bolus 125 milliLiter(s) IV Bolus once  dextrose 5%. 1000 milliLiter(s) (100 mL/Hr) IV Continuous <Continuous>  dextrose 5%. 1000 milliLiter(s) (50 mL/Hr) IV Continuous <Continuous>  dextrose 50% Injectable 12.5 Gram(s) IV Push once  dextrose 50% Injectable 25 Gram(s) IV Push once  diphenhydrAMINE Injectable 50 milliGRAM(s) IV Push once  ferrous    sulfate 325 milliGRAM(s) Oral two times a day  glucagon  Injectable 1 milliGRAM(s) IntraMuscular once  insulin glargine Injectable (LANTUS) 18 Unit(s) SubCutaneous at bedtime  insulin lispro (ADMELOG) corrective regimen sliding scale   SubCutaneous at bedtime  insulin lispro (ADMELOG) corrective regimen sliding scale   SubCutaneous three times a day before meals  insulin lispro Injectable (ADMELOG) 4 Unit(s) SubCutaneous three times a day before meals  levothyroxine 75 MICROGram(s) Oral daily  multivitamin 1 Tablet(s) Oral daily  nystatin    Suspension [id number] Unit(s) Oral four times a day  nystatin/triamcinolone Cream 1 Application(s) Topical every 12 hours  petrolatum white Ointment 1 Application(s) Topical three times a day  predniSONE   Tablet 50 milliGRAM(s) Oral once  senna 2 Tablet(s) Oral at bedtime  triamcinolone 0.1% Ointment 1 Application(s) Topical two times a day  vancomycin  IVPB 1000 milliGRAM(s) IV Intermittent once    MEDICATIONS  (PRN):  dextrose Oral Gel 15 Gram(s) Oral once PRN Blood Glucose LESS THAN 70 milliGRAM(s)/deciliter  metoclopramide Injectable 10 milliGRAM(s) IV Push every 6 hours PRN nausea vomiting  polyethylene glycol 3350 17 Gram(s) Oral two times a day PRN Constipation  sodium chloride 0.9% lock flush 10 milliLiter(s) IV Push every 1 hour PRN Pre/post blood products, medications, blood draw, and to maintain line patency      CAPILLARY BLOOD GLUCOSE      POCT Blood Glucose.: 220 mg/dL (24 Apr 2024 07:24)  POCT Blood Glucose.: 87 mg/dL (23 Apr 2024 23:59)  POCT Blood Glucose.: 92 mg/dL (23 Apr 2024 21:41)  POCT Blood Glucose.: 85 mg/dL (23 Apr 2024 18:05)  POCT Blood Glucose.: 109 mg/dL (23 Apr 2024 12:24)  POCT Blood Glucose.: 119 mg/dL (23 Apr 2024 08:14)    I&O's Summary    23 Apr 2024 07:01  -  24 Apr 2024 07:00  --------------------------------------------------------  IN: 870 mL / OUT: 1320 mL / NET: -450 mL        PHYSICAL EXAM:  Vital Signs Last 24 Hrs  T(C): 36.8 (24 Apr 2024 04:00), Max: 36.9 (23 Apr 2024 20:15)  T(F): 98.2 (24 Apr 2024 04:00), Max: 98.4 (23 Apr 2024 20:15)  HR: 80 (24 Apr 2024 04:00) (77 - 90)  BP: 105/64 (24 Apr 2024 04:00) (88/45 - 114/62)  BP(mean): 78 (24 Apr 2024 04:00) (78 - 78)  RR: 18 (24 Apr 2024 04:00) (16 - 18)  SpO2: 98% (24 Apr 2024 04:00) (96% - 100%)    Parameters below as of 24 Apr 2024 04:00  Patient On (Oxygen Delivery Method): nasal cannula  O2 Flow (L/min): 3      CONSTITUTIONAL: NAD, well-developed  HEET: MMM, EOMI, PERRLA  NECK: supple  RESPIRATORY: Normal respiratory effort; lungs are clear to auscultation bilaterally  CARDIOVASCULAR: Regular rate and rhythm, normal S1 and S2, no murmur/rub/gallop; No lower extremity edema; Peripheral pulses are 2+ bilaterally  ABDOMEN: Nontender to palpation, normoactive bowel sounds, no rebound/guarding; No hepatosplenomegaly  MUSCULOSKELETAL: no clubbing or cyanosis of digits; no joint swelling or tenderness to palpation  NEURO: Moving all four extremities, sensation grossly intact  PSYCH: A+O to person, place, and time; affect appropriate  SKIN: No rash    LABS:                        10.3   9.97  )-----------( 155      ( 23 Apr 2024 06:56 )             34.3     04-23    135  |  95<L>  |  49<H>  ----------------------------<  107<H>  4.1   |  28  |  4.09<H>    Ca    8.3<L>      23 Apr 2024 06:53  Phos  2.8     04-23  Mg     2.1     04-23    TPro  5.9<L>  /  Alb  2.9<L>  /  TBili  0.6  /  DBili  x   /  AST  10  /  ALT  9<L>  /  AlkPhos  53  04-23          Urinalysis Basic - ( 23 Apr 2024 06:53 )    Color: x / Appearance: x / SG: x / pH: x  Gluc: 107 mg/dL / Ketone: x  / Bili: x / Urobili: x   Blood: x / Protein: x / Nitrite: x   Leuk Esterase: x / RBC: x / WBC x   Sq Epi: x / Non Sq Epi: x / Bacteria: x           PROGRESS NOTE:   Authored by Dr. Mireya Hayden MD (PGY-1). Pager [facility] [phone]/ [facility]     Patient is a 73y old  Female who presents with a chief complaint of Syncope (24 Apr 2024 06:30)      SUBJECTIVE / OVERNIGHT EVENTS:  No acute events overnight. Feels tired - pt underwent HD session late last night.     All other ROS neg except as above in HPI    MEDICATIONS  (STANDING):  ascorbic acid 500 milliGRAM(s) Oral daily  aspirin  chewable 81 milliGRAM(s) Oral daily  atorvastatin 80 milliGRAM(s) Oral at bedtime  calamine/zinc oxide Lotion 1 Application(s) Topical three times a day  cetirizine 10 milliGRAM(s) Oral daily  chlorhexidine 0.12% Liquid 30 milliLiter(s) Swish and Spit once  chlorhexidine 4% Liquid 1 Application(s) Topical <User Schedule>  clopidogrel Tablet 75 milliGRAM(s) Oral daily  dextrose 10% Bolus 125 milliLiter(s) IV Bolus once  dextrose 5%. 1000 milliLiter(s) (100 mL/Hr) IV Continuous <Continuous>  dextrose 5%. 1000 milliLiter(s) (50 mL/Hr) IV Continuous <Continuous>  dextrose 50% Injectable 12.5 Gram(s) IV Push once  dextrose 50% Injectable 25 Gram(s) IV Push once  diphenhydrAMINE Injectable 50 milliGRAM(s) IV Push once  ferrous    sulfate 325 milliGRAM(s) Oral two times a day  glucagon  Injectable 1 milliGRAM(s) IntraMuscular once  insulin glargine Injectable (LANTUS) 18 Unit(s) SubCutaneous at bedtime  insulin lispro (ADMELOG) corrective regimen sliding scale   SubCutaneous at bedtime  insulin lispro (ADMELOG) corrective regimen sliding scale   SubCutaneous three times a day before meals  insulin lispro Injectable (ADMELOG) 4 Unit(s) SubCutaneous three times a day before meals  levothyroxine 75 MICROGram(s) Oral daily  multivitamin 1 Tablet(s) Oral daily  nystatin    Suspension 901438 Unit(s) Oral four times a day  nystatin/triamcinolone Cream 1 Application(s) Topical every 12 hours  petrolatum white Ointment 1 Application(s) Topical three times a day  predniSONE   Tablet 50 milliGRAM(s) Oral once  senna 2 Tablet(s) Oral at bedtime  triamcinolone 0.1% Ointment 1 Application(s) Topical two times a day  vancomycin  IVPB 1000 milliGRAM(s) IV Intermittent once    MEDICATIONS  (PRN):  dextrose Oral Gel 15 Gram(s) Oral once PRN Blood Glucose LESS THAN 70 milliGRAM(s)/deciliter  metoclopramide Injectable 10 milliGRAM(s) IV Push every 6 hours PRN nausea vomiting  polyethylene glycol 3350 17 Gram(s) Oral two times a day PRN Constipation  sodium chloride 0.9% lock flush 10 milliLiter(s) IV Push every 1 hour PRN Pre/post blood products, medications, blood draw, and to maintain line patency      CAPILLARY BLOOD GLUCOSE      POCT Blood Glucose.: 220 mg/dL (24 Apr 2024 07:24)  POCT Blood Glucose.: 87 mg/dL (23 Apr 2024 23:59)  POCT Blood Glucose.: 92 mg/dL (23 Apr 2024 21:41)  POCT Blood Glucose.: 85 mg/dL (23 Apr 2024 18:05)  POCT Blood Glucose.: 109 mg/dL (23 Apr 2024 12:24)  POCT Blood Glucose.: 119 mg/dL (23 Apr 2024 08:14)    I&O's Summary    23 Apr 2024 07:01  -  24 Apr 2024 07:00  --------------------------------------------------------  IN: 870 mL / OUT: 1320 mL / NET: -450 mL        PHYSICAL EXAM:  Vital Signs Last 24 Hrs  T(C): 36.8 (24 Apr 2024 04:00), Max: 36.9 (23 Apr 2024 20:15)  T(F): 98.2 (24 Apr 2024 04:00), Max: 98.4 (23 Apr 2024 20:15)  HR: 80 (24 Apr 2024 04:00) (77 - 90)  BP: 105/64 (24 Apr 2024 04:00) (88/45 - 114/62)  BP(mean): 78 (24 Apr 2024 04:00) (78 - 78)  RR: 18 (24 Apr 2024 04:00) (16 - 18)  SpO2: 98% (24 Apr 2024 04:00) (96% - 100%)    Parameters below as of 24 Apr 2024 04:00  Patient On (Oxygen Delivery Method): nasal cannula  O2 Flow (L/min): 3      PHYSICAL EXAM:  CONSTITUTIONAL: NAD  HEET: MMM, EOMI, PERRLA  NECK: supple  RESPIRATORY: Normal respiratory effort; bibasilar crackles   CARDIOVASCULAR: +punch biopsy on chest, no bleeding. Regular rate and rhythm, normal S1 and S2, systolic murmur, chronic lymphedema   ABDOMEN: Nontender to palpation, normoactive bowel sounds, no rebound/guarding;  MUSCULOSKELETAL: full ROM   NEURO: Moving all four extremities, sensation grossly intact  PSYCH: A+O to person, place, and time; affect appropriate  SKIN: psoriatic lesions on b/l UE and lymphedema rashes on LE. dry, flaking skin diffusely      LABS:                        10.3   9.97  )-----------( 155      ( 23 Apr 2024 06:56 )             34.3     04-23    135  |  95<L>  |  49<H>  ----------------------------<  107<H>  4.1   |  28  |  4.09<H>    Ca    8.3<L>      23 Apr 2024 06:53  Phos  2.8     04-23  Mg     2.1     04-23    TPro  5.9<L>  /  Alb  2.9<L>  /  TBili  0.6  /  DBili  x   /  AST  10  /  ALT  9<L>  /  AlkPhos  53  04-23          Urinalysis Basic - ( 23 Apr 2024 06:53 )    Color: x / Appearance: x / SG: x / pH: x  Gluc: 107 mg/dL / Ketone: x  / Bili: x / Urobili: x   Blood: x / Protein: x / Nitrite: x   Leuk Esterase: x / RBC: x / WBC x   Sq Epi: x / Non Sq Epi: x / Bacteria: x

## 2024-04-24 NOTE — PROGRESS NOTE ADULT - PROBLEM SELECTOR PLAN 10
Hospital Bundle    Fluids: FLUID RESTRICT 1.5L/day, NPO after MN for procedure  Electrolytes: Replete K > 4, Mg > 2, Phos > 3  Nutrition: Diet: CC   PPX  ---VTE: HSQ, hold in the AM for procedure  ---Resp: c/w home O2  Code Status: full code  Dispo: pending clinical improvement

## 2024-04-24 NOTE — PROGRESS NOTE ADULT - ATTENDING COMMENTS
73 year old female with PMH heart failure with reduced ejection fraction, AS, HTN, DM1 and hypothyroidism, who presented with syncope likely due to AS. While undergoing CT images for TAVR workup, she had an erythrodermic drug eruption likely due to the contrast. Allergy and dermatology have been consulted, recs appreciated. Will follow up derm biopsy results.     - plan for TAVR today. Pre-medications for IV contrast ordered as per protocol given  - appreciate EP recs: Micra implantation deferred at this time unless found to have high grade AVB intraoperatively during TAVR tomorrow  - continue HD session via [facility], and likely will need permacath upon discharge  - OOB to chair, PT recommends HONEY Dennison MD  Division of Hospital Medicine  Contact via [employer] Teams  Office: [phone]

## 2024-04-24 NOTE — PROGRESS NOTE ADULT - NUTRITIONAL ASSESSMENT
This patient has been assessed with a concern for Malnutrition and has been determined to have a diagnosis/diagnoses of Morbid obesity (BMI > 40).   This patient has been assessed with a concern for Malnutrition and has been determined to have a diagnosis/diagnoses of Morbid obesity (BMI > 40).  Diet, NPO:   Except Medications (04-24-24 @ 19:44) [Active]

## 2024-04-24 NOTE — PROGRESS NOTE ADULT - ASSESSMENT
Assessment:  72 y/o female w/ PMH: HFrEF (EF 30%), AS, LBBB, HTN, IDDM1, CKD, hypothyroidism, chronic lymphedema, suspected OHS/LELIA (3L NC home O2) p/w 1 episode of syncope most likely 2/2 moderate to severe AS, admitted to floors for TAVR w/u. Course complicated by suspected contrast induced allergic reaction (had CTA for TAVR eval) and contrast related renal failure (acute on chronic) requiring HD. Pt. went for TAVR on 2/24 which was c/b VT and vfib requiring shock and flash pulmonary edema causing AHRF requiring intubation. Currently undergoing HD for hyperkalemia and fluid overload. Currently on pressor support for possible vasoplegic shock, all of which require management in CICU.    Plan:    NEURO:  - Intubated, sedated w/ propofol, titrate for RASS -1  - A&Ox3 at baseline  - Monitor mental status per protocol  - SAT when more stable      PULM  # Acute hypoxic respiratory failure most likely 2/2 flash pulmonary edema  # OHS/LELIA  - c/w full vent support: AC/VC: 600/16/8/80%  - f/u CXR  - SBTs when able  - Trend ABGs, wean vent as able    CV  # VT/vfib s/p shock  # Known LBBB  - Monitor on tele  - Trend lytes    # 1st degree AVB w/ PACs  # Severe AS s/p TAVR  - TTE in AM  - c/w ASA  - c/w atorvastatin     # Vasolplegia most likely 2/2 sedation   - c/w vaso, titrate to MAP >=65  - Trend lactate    # HFrEF  - f/u TTE in AM  - Holding GDMT while on vaso    /RENAL  # DUNCAN on CKD requring HD c/b hyperkalemia  - Requiring HD, currenly undergoing  - Acutely remove fluid w/ HD, then CRRT overnight  - Strict I/Os  - Trend BMP, lytes  - Avoid nephrotoxins    GI  - NPO    ENDO  # IDDM1  - ISS  - Lantus 8  - Monitor glucose q6hrs while NPO    # Hypothyroidism  - c/w home levothyroxine     SKIN  Acute generalized exanthematous pustulosis due to drug  - Calamine  - Vaseline    Lymphedema   - Elevate legs  - Compression stockings, ACE wrapping    Oral Candidiasis  - Nystatin     ID  - No active issues    Patient requires continuous monitoring with bedside rhythm monitoring, pulse ox monitoring, and intermittent blood gas analysis. Care plan discussed with ICU care team. Patient remained critical and at risk for life threatening decompensation.  Patient seen, examined and plan discussed with CCU team during rounds.     I have personally provided 35 minutes of critical care time excluding time spent on separate procedures, in addition to initial critical care time provided by the CICU Attending, Dr. Quintero.    Becka Martinez, [facility]-BC      Assessment:  74 y/o female w/ PMH: HFrEF (EF 30%), AS, LBBB, HTN, IDDM1, CKD, hypothyroidism, chronic lymphedema, suspected OHS/LELIA (3L NC home O2) p/w 1 episode of syncope most likely 2/2 moderate to severe AS, admitted to floors for TAVR w/u. Course complicated by suspected contrast induced allergic reaction (had CTA for TAVR eval) and contrast related renal failure (acute on chronic) requiring HD. Pt. went for TAVR on 2/24 which was c/b VT and vfib requiring shock and flash pulmonary edema causing AHRF requiring intubation. Currently undergoing HD for hyperkalemia and fluid overload. Currently on pressor support for possible vasoplegic shock, all of which require management in CICU.    Plan:    NEURO:  - Intubated, sedated w/ propofol, titrate for RASS -1  - A&Ox3 at baseline  - Monitor mental status per protocol  - SAT when more stable      PULM  # Acute hypoxic respiratory failure most likely 2/2 flash pulmonary edema  # OHS/LELIA  - c/w full vent support: AC/VC: 600/16/8/80%  - f/u CXR  - SBTs when able  - Trend ABGs, wean vent as able    CV  # VT/vfib s/p shock  # Known LBBB  - Monitor on tele  - Trend lytes    # 1st degree AVB w/ PACs  # Severe AS s/p TAVR  - TTE in AM  - c/w ASA  - c/w atorvastatin     # Vasolplegia most likely 2/2 sedation   - c/w vaso, titrate to MAP >=65  - Trend lactate    # HFrEF  - f/u TTE in AM  - Holding GDMT while on vaso    /RENAL  # DUNCAN on CKD requring HD c/b hyperkalemia  - Requiring HD, currenly undergoing  - Acutely remove fluid w/ HD, then CRRT overnight  - Strict I/Os  - Trend BMP, lytes  - Avoid nephrotoxins    GI  - NPO    ENDO  # IDDM1  - ISS  - Lantus 8  - Monitor glucose q6hrs while NPO    # Hypothyroidism  - c/w home levothyroxine     SKIN  # Acute generalized exanthematous pustulosis due to drug  - c/w calamine  - c/w vaseline    # Lymphedema   - Elevate legs  - Compression stockings, ACE wrapping    ID  - Monitor WBC and for fevers    # Oral Candidiasis  - c/w nystatin     Patient requires continuous monitoring with bedside rhythm monitoring, pulse ox monitoring, and intermittent blood gas analysis. Care plan discussed with ICU care team. Patient remained critical and at risk for life threatening decompensation.  Patient seen, examined and plan discussed with CCU team during rounds.     I have personally provided 35 minutes of critical care time excluding time spent on separate procedures, in addition to initial critical care time provided by the CICU Attending, Dr. Quintero.    Becka Martinez, [facility]-BC

## 2024-04-24 NOTE — PROGRESS NOTE ADULT - SUBJECTIVE AND OBJECTIVE BOX
CHIEF COMPLAINT:  Patient is a 73y old female who presents with a chief complaint of Syncope (24 Apr 2024 19:26)    INTERVAL HISTORY:  - s/p TAVR c/b flash pulmonary edema requiring intubation    REVIEW OF SYSTEMS:  [X] Unable to assess ROS because pt. intubated, sedated.    MEDICATIONS:  MEDICATIONS  (STANDING):  ascorbic acid 500 milliGRAM(s) Oral daily  aspirin  chewable 81 milliGRAM(s) Oral daily  atorvastatin 80 milliGRAM(s) Oral at bedtime  calamine/zinc oxide Lotion 1 Application(s) Topical three times a day  chlorhexidine 0.12% Liquid 15 milliLiter(s) Oral Mucosa every 12 hours  chlorhexidine 2% Cloths 1 Application(s) Topical daily  CRRT Treatment    <Continuous>  ferrous    sulfate 325 milliGRAM(s) Oral two times a day  insulin glargine Injectable (LANTUS) 8 Unit(s) SubCutaneous at bedtime  insulin lispro (ADMELOG) corrective regimen sliding scale   SubCutaneous every 6 hours  levothyroxine 75 MICROGram(s) Oral daily  multivitamin 1 Tablet(s) Oral daily  nystatin    Suspension 404400 Unit(s) Oral four times a day  petrolatum white Ointment 1 Application(s) Topical three times a day  PrismaSATE Dialysate BGK 4 / 2.5 5000 milliLiter(s) (1400 mL/Hr) CRRT <Continuous>  PrismaSOL Filtration BGK 0 / 2.5 5000 milliLiter(s) (800 mL/Hr) CRRT <Continuous>  PrismaSOL Filtration BGK 4 / 2.5 5000 milliLiter(s) (600 mL/Hr) CRRT <Continuous>  propofol Infusion 10 MICROgram(s)/kG/Min (8.98 mL/Hr) IV Continuous <Continuous>  senna 2 Tablet(s) Oral at bedtime  vasopressin Infusion 0.06 Unit(s)/Min (9 mL/Hr) IV Continuous <Continuous>    MEDICATIONS  (PRN):  polyethylene glycol 3350 17 Gram(s) Oral two times a day PRN Constipation      ALLERGIES:  Allergies    contrast media (iodine-based) (Fever; Vomiting; Flushing; Hypotension; Rash; Stomach Upset)  Ativan (Rash; Urticaria)  penicillins (Hives (Mod to Severe); Anaphylaxis (Mod to Severe); Short breath (Mod to Severe); Angioedema (Mod to Severe))    Intolerances        OBJECTIVE:  ICU Vital Signs Last 24 Hrs  T(C): 36.6 (24 Apr 2024 17:00), Max: 36.9 (23 Apr 2024 20:15)  T(F): 97.9 (24 Apr 2024 17:00), Max: 98.4 (23 Apr 2024 20:15)  HR: 66 (24 Apr 2024 18:53) (59 - 109)  BP: 110/59 (24 Apr 2024 16:44) (88/45 - 114/62)  BP(mean): 80 (24 Apr 2024 16:44) (78 - 80)  ABP: 108/39 (24 Apr 2024 18:53) (104/49 - 114/46)  ABP(mean): 63 (24 Apr 2024 18:53) (63 - 77)  RR: 4 (24 Apr 2024 18:53) (4 - 18)  SpO2: 99% (24 Apr 2024 18:53) (92% - 100%)    O2 Parameters below as of 24 Apr 2024 12:03    O2 Flow (L/min): 3        Mode: AC/ CMV (Assist Control/ Continuous Mandatory Ventilation)  RR (machine): 16  TV (machine): 600  FiO2: 100  PEEP: 8  ITime: 1  MAP: 13  PIP: 26    Adult Advanced Hemodynamics Last 24 Hrs  CVP(mm Hg): --  CVP(cm H2O): --  CO: --  CI: --  PA: --  PA(mean): --  PCWP: --  SVR: --  SVRI: --  PVR: --  PVRI: --  CAPILLARY BLOOD GLUCOSE      POCT Blood Glucose.: 220 mg/dL (24 Apr 2024 07:24)  POCT Blood Glucose.: 87 mg/dL (23 Apr 2024 23:59)  POCT Blood Glucose.: 92 mg/dL (23 Apr 2024 21:41)    CAPILLARY BLOOD GLUCOSE      POCT Blood Glucose.: 220 mg/dL (24 Apr 2024 07:24)    I&O's Summary    23 Apr 2024 07:01  -  24 Apr 2024 07:00  --------------------------------------------------------  IN: 870 mL / OUT: 1320 mL / NET: -450 mL    24 Apr 2024 07:01  -  24 Apr 2024 19:41  --------------------------------------------------------  IN: 56 mL / OUT: 60 mL / NET: -4 mL      Daily Height in cm: 180.34 (24 Apr 2024 12:03)    Daily     PHYSICAL EXAMINATION:  General: WN/WD NAD  HEENT: PERRLA, EOMI, moist mucous membranes  Neurology: A&Ox3, nonfocal, PEREZ x 4  Respiratory: CTA B/L, normal respiratory effort, no wheezes, crackles, rales  CV: RRR, S1S2, no murmurs, rubs or gallops  Abdominal: Soft, NT, ND +BS, Last BM  Extremities: No edema, + peripheral pulses  Incisions:   Tubes:    LABS:  ABG - ( 24 Apr 2024 18:02 )  pH, Arterial: 7.22  pH, Blood: x     /  pCO2: 39    /  pO2: 336   / HCO3: 16    / Base Excess: -11.0 /  SaO2: 100.0                                   10.2   14.11 )-----------( 126      ( 24 Apr 2024 18:21 )             33.7     04-24    133<L>  |  92<L>  |  52<H>  ----------------------------<  335<H>  6.1<H>   |  15<L>  |  3.89<H>    Ca    7.8<L>      24 Apr 2024 18:21  Phos  5.9     04-24  Mg     2.2     04-24    TPro  5.8<L>  /  Alb  2.9<L>  /  TBili  0.7  /  DBili  x   /  AST  17  /  ALT  10  /  AlkPhos  60  04-24    LIVER FUNCTIONS - ( 24 Apr 2024 18:21 )  Alb: 2.9 g/dL / Pro: 5.8 g/dL / ALK PHOS: 60 U/L / ALT: 10 U/L / AST: 17 U/L / GGT: x           PT/INR - ( 24 Apr 2024 18:21 )   PT: 15.8 sec;   INR: 1.52 ratio         PTT - ( 24 Apr 2024 18:21 )  PTT:27.3 sec        Urinalysis Basic - ( 24 Apr 2024 18:21 )    Color: x / Appearance: x / SG: x / pH: x  Gluc: 335 mg/dL / Ketone: x  / Bili: x / Urobili: x   Blood: x / Protein: x / Nitrite: x   Leuk Esterase: x / RBC: x / WBC x   Sq Epi: x / Non Sq Epi: x / Bacteria: x        TELEMETRY:     EKG:     IMAGING:       CHIEF COMPLAINT:  Patient is a 73y old female who presents with a chief complaint of Syncope (24 Apr 2024 19:26)    INTERVAL HISTORY:  - s/p TAVR c/b flash pulmonary edema requiring intubation    REVIEW OF SYSTEMS:  [X] Unable to assess ROS because pt. intubated, sedated.    MEDICATIONS:  MEDICATIONS  (STANDING):  ascorbic acid 500 milliGRAM(s) Oral daily  aspirin  chewable 81 milliGRAM(s) Oral daily  atorvastatin 80 milliGRAM(s) Oral at bedtime  calamine/zinc oxide Lotion 1 Application(s) Topical three times a day  chlorhexidine 0.12% Liquid 15 milliLiter(s) Oral Mucosa every 12 hours  chlorhexidine 2% Cloths 1 Application(s) Topical daily  CRRT Treatment    <Continuous>  ferrous    sulfate 325 milliGRAM(s) Oral two times a day  insulin glargine Injectable (LANTUS) 8 Unit(s) SubCutaneous at bedtime  insulin lispro (ADMELOG) corrective regimen sliding scale   SubCutaneous every 6 hours  levothyroxine 75 MICROGram(s) Oral daily  multivitamin 1 Tablet(s) Oral daily  nystatin    Suspension 908733 Unit(s) Oral four times a day  petrolatum white Ointment 1 Application(s) Topical three times a day  PrismaSATE Dialysate BGK 4 / 2.5 5000 milliLiter(s) (1400 mL/Hr) CRRT <Continuous>  PrismaSOL Filtration BGK 0 / 2.5 5000 milliLiter(s) (800 mL/Hr) CRRT <Continuous>  PrismaSOL Filtration BGK 4 / 2.5 5000 milliLiter(s) (600 mL/Hr) CRRT <Continuous>  propofol Infusion 10 MICROgram(s)/kG/Min (8.98 mL/Hr) IV Continuous <Continuous>  senna 2 Tablet(s) Oral at bedtime  vasopressin Infusion 0.06 Unit(s)/Min (9 mL/Hr) IV Continuous <Continuous>    MEDICATIONS  (PRN):  polyethylene glycol 3350 17 Gram(s) Oral two times a day PRN Constipation      ALLERGIES:  contrast media (iodine-based) (Fever; Vomiting; Flushing; Hypotension; Rash; Stomach Upset)  Ativan (Rash; Urticaria)  penicillins (Hives (Mod to Severe); Anaphylaxis (Mod to Severe); Short breath (Mod to Severe); Angioedema (Mod to Severe))    OBJECTIVE:  ICU Vital Signs Last 24 Hrs  T(C): 36.6 (24 Apr 2024 17:00), Max: 36.9 (23 Apr 2024 20:15)  T(F): 97.9 (24 Apr 2024 17:00), Max: 98.4 (23 Apr 2024 20:15)  HR: 66 (24 Apr 2024 18:53) (59 - 109)  BP: 110/59 (24 Apr 2024 16:44) (88/45 - 114/62)  BP(mean): 80 (24 Apr 2024 16:44) (78 - 80)  ABP: 108/39 (24 Apr 2024 18:53) (104/49 - 114/46)  ABP(mean): 63 (24 Apr 2024 18:53) (63 - 77)  RR: 4 (24 Apr 2024 18:53) (4 - 18)  SpO2: 99% (24 Apr 2024 18:53) (92% - 100%)    O2 Parameters below as of 24 Apr 2024 12:03  O2 Flow (L/min): 3    Mode: AC/ CMV (Assist Control/ Continuous Mandatory Ventilation)  RR (machine): 16  TV (machine): 600  FiO2: 100  PEEP: 8  ITime: 1  MAP: 13  PIP: 26    CAPILLARY BLOOD GLUCOSE  POCT Blood Glucose.: 220 mg/dL (24 Apr 2024 07:24)  POCT Blood Glucose.: 87 mg/dL (23 Apr 2024 23:59)  POCT Blood Glucose.: 92 mg/dL (23 Apr 2024 21:41)    I&O's Summary    23 Apr 2024 07:01  -  24 Apr 2024 07:00  --------------------------------------------------------  IN: 870 mL / OUT: 1320 mL / NET: -450 mL    24 Apr 2024 07:01  -  24 Apr 2024 19:41  --------------------------------------------------------  IN: 56 mL / OUT: 60 mL / NET: -4 mL      Daily Height in cm: 180.34 (24 Apr 2024 12:03)    Daily     LABS:  ABG - ( 24 Apr 2024 18:02 )  pH, Arterial: 7.22  pH, Blood: x     /  pCO2: 39    /  pO2: 336   / HCO3: 16    / Base Excess: -11.0 /  SaO2: 100.0                         10.2   14.11 )-----------( 126      ( 24 Apr 2024 18:21 )             33.7     04-24    133<L>  |  92<L>  |  52<H>  ----------------------------<  335<H>  6.1<H>   |  15<L>  |  3.89<H>    Ca    7.8<L>      24 Apr 2024 18:21  Phos  5.9     04-24  Mg     2.2     04-24    TPro  5.8<L>  /  Alb  2.9<L>  /  TBili  0.7  /  DBili  x   /  AST  17  /  ALT  10  /  AlkPhos  60  04-24    LIVER FUNCTIONS - ( 24 Apr 2024 18:21 )  Alb: 2.9 g/dL / Pro: 5.8 g/dL / ALK PHOS: 60 U/L / ALT: 10 U/L / AST: 17 U/L / GGT: x           PT/INR - ( 24 Apr 2024 18:21 )   PT: 15.8 sec;   INR: 1.52 ratio    PTT - ( 24 Apr 2024 18:21 )  PTT: 27.3 sec

## 2024-04-24 NOTE — PROGRESS NOTE ADULT - ASSESSMENT
73F w HFrEF (EF 30%), mod AS, known LBBB, HTN, IDDM1, CKD, hypothyroidism, chronic lymphedema, p/w 1 episode of syncope with R arm jerking.     #Syncope-Aortic Stenosis  - Episode after exertion  - Known Aortic Stenosis likely Severe in setting of decreased LVEF  -No further brradycardic events off BBlockers  -Evaluation for CRT-P due to LV dysfunction and underlying LBBB  -Scheduled for TAVR today    #Chronic Systolic HF (EF 30%), mod to severe AS, HTN, LBBB, Lymphedema   - Has known systolic HF and AS.    - Repeat TTE showed EF 30%, mild-mod LVH, mildly reduced RVF, mod-severe AS (.61 cmsq), mod pHTN  - Had cath 2022-Non obstructive CAD  - On home Torsemide 20 mg daily, Eplerenone 25 mg daily, and Toprol  mg daily  - Compliant with all meds including Torsemide, though, dose was reduced to Furosemide 40mg once daily.   - proBNP 51K from 80k.   - Consideration for TAVR  - Was on bumex gtt but then overnight 4/15 developed a fever to 102F and became hypotensive overnight. Bumex gtt stopped, given 250cc bolus. BPs remain low. UOP has dropped. I suspect she is intravascularly depleted.   - As per ID team, fever though to be 2/2 allergic rxn to contrast, rec to hold abx  -Now on HD-tolerating    # Acute kidney injury superimposed on CKD.   - now with worsening Cr  - HD initiated-tolerating  - vol removal with HD     #LBBB  Known LBBB  Noted intermittent Wenkebach on telemetry with bradycardia and 2:1 AV conduction  - EP has been consulted.   -No fuerher bradt events

## 2024-04-24 NOTE — PROGRESS NOTE ADULT - PROBLEM SELECTOR PLAN 3
Unclear baseline SCr likely 2.1-2.2  - s/p multiple HD sessions  -Minimal urine output     Plan:  - trend BUN/SCr, avoid nephrotoxic, renally dose all meds  - strict I/Os  - hardy placed for accurate I/O and AUR, maintain for now  - Will likely take out hardy post TAVR

## 2024-04-24 NOTE — PROGRESS NOTE ADULT - PROBLEM SELECTOR PLAN 4
RRT 4/16 for sepsis rectal temp 102, hypotension to systolic 70s, no leukocytosis, with new onset vomiting and rash  likely 2/2 delayed contrast rxn  -Derm Bx consistent with AGEP  -much improved    Plan:  -C/w Zyrtec  -Will premedicate with benadryl steroids prior to TAVR

## 2024-04-24 NOTE — PROGRESS NOTE ADULT - TIME BILLING
- Review of records, telemetry, vital signs and daily labs.   - General and cardiovascular physical examination.  - Generation of cardiovascular treatment plan.  - Coordination of care.      Patient was seen and examined by me on  04/24/2024,interim events noted,labs and radiology studies reviewed.  Cuco Tubbs MD,FACC.  [address] [place], [place] [id number].  [phone]

## 2024-04-24 NOTE — PROGRESS NOTE ADULT - ASSESSMENT
73F w HFrEF (EF 30%), mod AS, known LBBB, HTN, IDDM1, CKD, hypothyroidism, chronic lymphedema, suspected OHS/LELIA on 3L NC home O2 p/w 1 episode of syncope. Recent admission at [facility] (3/29-4/5) for ADHF and DUNCAN s/p diuresis, discharged with new home O2 3L NC suspecting OHS/LELIA pending outpatient w/u. Since discharge a week ago, she has been staying at home with   Pt had sob walking to car. Once in car, she was still breathing heavily. Partner noticed pt was not responding to verbal stimuli for 10-15sec and witnessed R arm jerking. Pt gained consciousness quickly without postictal symptoms. Pt went to Cardiologist Dr. Nathan who recommended pt to come to ED. No fever, cp, abd pain, n/v. Leg swelling is improved from prior. Pt on torsemide 40mg which she has been taking. Pt was discharged on 3LNC which she is currently on. Patient reports she did not take Farxiga that she was discharged on as she was concerned about side effects.     In ED, patient was found afebrile, hemodynamically stable, breathing well on 3L NC. Initial labs notable for mild hyponatremia, DUNCAN on CKD, elevated proBNP and elevated pCO2 both improved from prior.  pt also noticed with abn creatinine      1- CKD IV  with DUNCAN   2- CHF   3- lymphedema   4- severe AS  s/p tavr 4/24  5- syncope   6- hyperkalemia  7- hypotension         suspect ATN from hypotension along with contrast nephropathy   creatinine worsening requiring renal replacement therapy, HD initiated 4/18   HD   dialyzer F 160  180 min 2 k bath bfr 300 dfr 600 cc with 2 liter fluid removal   post hd change to CRRT for fluid removal   will need to have temp hd cath changed to tunnel cath after TAVR   EP consulted for syncope, recommending device placement for bradycardia postponed  structural heart team following, s/p TAVR  derm following for rash  pressor support   vent support   dYoanw CCU team

## 2024-04-24 NOTE — PROGRESS NOTE ADULT - SUBJECTIVE AND OBJECTIVE BOX
24H hour events: SR/ST w/ 1st deg AVB 90's - 100's this AM, episode appears c/w blocked APC's overnight     MEDICATIONS:  aspirin  chewable 81 milliGRAM(s) Oral daily  clopidogrel Tablet 75 milliGRAM(s) Oral daily    nystatin    Suspension 894865 Unit(s) Oral four times a day  vancomycin  IVPB 1000 milliGRAM(s) IV Intermittent once    cetirizine 10 milliGRAM(s) Oral daily  diphenhydrAMINE Injectable 50 milliGRAM(s) IV Push once    metoclopramide Injectable 10 milliGRAM(s) IV Push every 6 hours PRN    polyethylene glycol 3350 17 Gram(s) Oral two times a day PRN  senna 2 Tablet(s) Oral at bedtime    atorvastatin 80 milliGRAM(s) Oral at bedtime  dextrose 50% Injectable 12.5 Gram(s) IV Push once  dextrose 50% Injectable 25 Gram(s) IV Push once  dextrose Oral Gel 15 Gram(s) Oral once PRN  glucagon  Injectable 1 milliGRAM(s) IntraMuscular once  insulin glargine Injectable (LANTUS) 18 Unit(s) SubCutaneous at bedtime  insulin lispro (ADMELOG) corrective regimen sliding scale   SubCutaneous three times a day before meals  insulin lispro (ADMELOG) corrective regimen sliding scale   SubCutaneous at bedtime  insulin lispro Injectable (ADMELOG) 4 Unit(s) SubCutaneous three times a day before meals  levothyroxine 75 MICROGram(s) Oral daily  predniSONE   Tablet 50 milliGRAM(s) Oral once    ascorbic acid 500 milliGRAM(s) Oral daily  calamine/zinc oxide Lotion 1 Application(s) Topical three times a day  chlorhexidine 0.12% Liquid 30 milliLiter(s) Swish and Spit once  chlorhexidine 4% Liquid 1 Application(s) Topical <User Schedule>  dextrose 10% Bolus 125 milliLiter(s) IV Bolus once  dextrose 5%. 1000 milliLiter(s) IV Continuous <Continuous>  dextrose 5%. 1000 milliLiter(s) IV Continuous <Continuous>  ferrous    sulfate 325 milliGRAM(s) Oral two times a day  multivitamin 1 Tablet(s) Oral daily  nystatin/triamcinolone Cream 1 Application(s) Topical every 12 hours  petrolatum white Ointment 1 Application(s) Topical three times a day  sodium chloride 0.9% lock flush 10 milliLiter(s) IV Push every 1 hour PRN  triamcinolone 0.1% Ointment 1 Application(s) Topical two times a day      REVIEW OF SYSTEMS:  See HPI, otherwise ROS negative.    PHYSICAL EXAM:  T(C): 36.8 (04-24-24 @ 04:00), Max: 36.9 (04-23-24 @ 20:15)  HR: 80 (04-24-24 @ 04:00) (77 - 90)  BP: 105/64 (04-24-24 @ 04:00) (88/45 - 114/62)  RR: 18 (04-24-24 @ 04:00) (16 - 18)  SpO2: 98% (04-24-24 @ 04:00) (96% - 100%)  Wt(kg): --  I&O's Summary    23 Apr 2024 07:01  -  24 Apr 2024 07:00  --------------------------------------------------------  IN: 870 mL / OUT: 1320 mL / NET: -450 mL    24 Apr 2024 07:01  -  24 Apr 2024 10:11  --------------------------------------------------------  IN: 0 mL / OUT: 50 mL / NET: -50 mL        Appearance: Alert. NAD	  Cardiovascular: +S1S2 RRR no m/g/r  Respiratory: CTA B/L	  Psychiatry: A & O x 3, Mood & affect appropriate  Gastrointestinal:  Soft, NT. ND. +BS	  Skin: No rashes	  Neurologic: Non-focal  Extremities: No edema BLE  Vascular: Peripheral pulses palpable 2+ bilaterally      LABS:	 	    CBC Full  -  ( 24 Apr 2024 08:12 )  WBC Count : 11.39 K/uL  Hemoglobin : 11.2 g/dL  Hematocrit : 36.4 %  Platelet Count - Automated : 137 K/uL  Mean Cell Volume : 91.5 fl  Mean Cell Hemoglobin : 28.1 pg  Mean Cell Hemoglobin Concentration : 30.8 gm/dL  Auto Neutrophil # : x  Auto Lymphocyte # : x  Auto Monocyte # : x  Auto Eosinophil # : x  Auto Basophil # : x  Auto Neutrophil % : x  Auto Lymphocyte % : x  Auto Monocyte % : x  Auto Eosinophil % : x  Auto Basophil % : x    04-24    133<L>  |  96  |  43<H>  ----------------------------<  211<H>  5.2   |  21<L>  |  3.49<H>  04-23    135  |  95<L>  |  49<H>  ----------------------------<  107<H>  4.1   |  28  |  4.09<H>    Ca    8.4      24 Apr 2024 08:12  Ca    8.3<L>      23 Apr 2024 06:53  Phos  3.5     04-24  Phos  2.8     04-23  Mg     2.1     04-24  Mg     2.1     04-23    TPro  6.3  /  Alb  3.0<L>  /  TBili  0.7  /  DBili  x   /  AST  13  /  ALT  8<L>  /  AlkPhos  60  04-24  TPro  5.9<L>  /  Alb  2.9<L>  /  TBili  0.6  /  DBili  x   /  AST  10  /  ALT  9<L>  /  AlkPhos  53  04-23      proBNP:   TSH: Thyroid Stimulating Hormone, Serum: 5.06 uIU/mL (04.14.24 @ 07:18)    TELEMETRY: 's w/ 1st deg AVB

## 2024-04-24 NOTE — PROGRESS NOTE ADULT - NUTRITIONAL ASSESSMENT
This patient has been assessed with a concern for Malnutrition and has been determined to have a diagnosis/diagnoses of Morbid obesity (BMI > 40).    This patient is being managed with:   Diet NPO after Midnight-     NPO Start Date: 23-Apr-2024   NPO Start Time: 23:59  Except Medications  Entered: Apr 23 2024 10:47AM    Diet Regular-  Consistent Carbohydrate {Evening Snack} (CSTCHOSN)  DASH/TLC {Sodium & Cholesterol Restricted} (DASH)  1500mL Fluid Restriction ([id number])  Entered: Apr 19 2024  7:44AM

## 2024-04-24 NOTE — PROGRESS NOTE ADULT - PROBLEM SELECTOR PLAN 1
syncope 2/2 mod to severe AS     Plan:  -Plan for snorkel TAVR 4/24 at 11:30AM,  -Give Plavix 600mg 4/23 and start plavix 75mg 4/24, c/w ASA 81 daily for thrombi PPX for TAVR   -PPM at the time of TAVR if she goes into a block, not ideal for Micra given low EF and LBBB and nonselective RV pacing  -For contrast allergy: premedicate with prednisone 50 po at 12h, 7h, 1h prior to procedure and IV benadryl 50 1hr prior.  -CTM on telemetry

## 2024-04-24 NOTE — PROGRESS NOTE ADULT - SUBJECTIVE AND OBJECTIVE BOX
[facility]   [phone]  RENAL FOLLOW UP NOTE  --------------------------------------------------------------------------------  Chief Complaint:    24 hour events/subjective:    seen earlier pre and post TAVR  intra op VT   post op intubated     PAST HISTORY  --------------------------------------------------------------------------------  No significant changes to PMH, PSH, FHx, SHx, unless otherwise noted    ALLERGIES & MEDICATIONS  --------------------------------------------------------------------------------  Allergies    contrast media (iodine-based) (Fever; Vomiting; Flushing; Hypotension; Rash; Stomach Upset)  Ativan (Rash; Urticaria)  penicillins (Hives (Mod to Severe); Anaphylaxis (Mod to Severe); Short breath (Mod to Severe); Angioedema (Mod to Severe))    Intolerances      Standing Inpatient Medications  ascorbic acid 500 milliGRAM(s) Oral daily  aspirin  chewable 81 milliGRAM(s) Oral daily  atorvastatin 80 milliGRAM(s) Oral at bedtime  calamine/zinc oxide Lotion 1 Application(s) Topical three times a day  chlorhexidine 0.12% Liquid 15 milliLiter(s) Oral Mucosa every 12 hours  chlorhexidine 2% Cloths 1 Application(s) Topical daily  CRRT Treatment    <Continuous>  ferrous    sulfate 325 milliGRAM(s) Oral two times a day  insulin glargine Injectable (LANTUS) 8 Unit(s) SubCutaneous at bedtime  insulin lispro (ADMELOG) corrective regimen sliding scale   SubCutaneous every 6 hours  levothyroxine 75 MICROGram(s) Oral daily  multivitamin 1 Tablet(s) Oral daily  nystatin    Suspension 087593 Unit(s) Oral four times a day  petrolatum white Ointment 1 Application(s) Topical three times a day  PrismaSATE Dialysate BGK 4 / 2.5 5000 milliLiter(s) CRRT <Continuous>  PrismaSOL Filtration BGK 0 / 2.5 5000 milliLiter(s) CRRT <Continuous>  PrismaSOL Filtration BGK 4 / 2.5 5000 milliLiter(s) CRRT <Continuous>  propofol Infusion 10 MICROgram(s)/kG/Min IV Continuous <Continuous>  senna 2 Tablet(s) Oral at bedtime  vasopressin Infusion 0.06 Unit(s)/Min IV Continuous <Continuous>    PRN Inpatient Medications  polyethylene glycol 3350 17 Gram(s) Oral two times a day PRN      REVIEW OF SYSTEMS  --------------------------------------------------------------------------------      VITALS/PHYSICAL EXAM  --------------------------------------------------------------------------------  T(C): 36.6 (04-24-24 @ 12:03), Max: 36.9 (04-23-24 @ 20:15)  HR: 66 (04-24-24 @ 18:53) (59 - 109)  BP: 110/59 (04-24-24 @ 16:44) (88/45 - 114/62)  RR: 15 (04-24-24 @ 18:14) (14 - 18)  SpO2: 99% (04-24-24 @ 18:53) (92% - 100%)  Wt(kg): --  Height (cm): 180.3 (04-24-24 @ 12:03)  Weight (kg): 149.7 (04-24-24 @ 12:03)  BMI (kg/m2): 46.1 (04-24-24 @ 12:03)  BSA (m2): 2.61 (04-24-24 @ 12:03)      04-23-24 @ 07:01  -  04-24-24 @ 07:00  --------------------------------------------------------  IN: 870 mL / OUT: 1320 mL / NET: -450 mL    04-24-24 @ 07:01  -  04-24-24 @ 19:27  --------------------------------------------------------  IN: 56 mL / OUT: 60 mL / NET: -4 mL      Physical Exam:  	  Gen:  on RA, facial erythema, neck and arm erythema,  intubated   	Pulm: decrease bs  no rales or ronchi or wheezing  	CV: No JVD. RRR, S1S2; no rub II/VI M   	Abd: +BS, soft, nontender/nondistended, obese   	UE: Warm, no cyanosis  no clubbing,   +  edema  	LE: Warm, no cyanosis  no clubbing, 4+ edema, B/L LE raised red plaque   	Neuro: intubated                Vascular Access: RIJ non tunneled HD catheter      LABS/STUDIES  --------------------------------------------------------------------------------              10.2   14.11 >-----------<  126      [04-24-24 @ 18:21]              33.7     133  |  92  |  52  ----------------------------<  335      [04-24-24 @ 18:21]  6.1   |  15  |  3.89        Ca     7.8     [04-24-24 @ 18:21]      Mg     2.2     [04-24-24 @ 18:21]      Phos  5.9     [04-24-24 @ 18:21]    TPro  5.8  /  Alb  2.9  /  TBili  0.7  /  DBili  x   /  AST  17  /  ALT  10  /  AlkPhos  60  [04-24-24 @ 18:21]    PT/INR: PT 15.8 , INR 1.52       [04-24-24 @ 18:21]  PTT: 27.3       [04-24-24 @ 18:21]      Creatinine Trend:  SCr 3.89 [04-24 @ 18:21]  SCr 3.49 [04-24 @ 08:12]  SCr 4.09 [04-23 @ 06:53]  SCr 4.75 [04-22 @ 05:02]  SCr 4.02 [04-21 @ 11:38]              Urinalysis - [04-24-24 @ 18:21]      Color  / Appearance  / SG  / pH       Gluc 335 / Ketone   / Bili  / Urobili        Blood  / Protein  / Leuk Est  / Nitrite       RBC  / WBC  / Hyaline  / Gran  / Sq Epi  / Non Sq Epi  / Bacteria     Urinalysis - [04-24-24 @ 18:21]      Color  / Appearance  / SG  / pH       Gluc 335 / Ketone   / Bili  / Urobili        Blood  / Protein  / Leuk Est  / Nitrite       RBC  / WBC  / Hyaline  / Gran  / Sq Epi  / Non Sq Epi  / Bacteria       PTH -- (Ca 8.4)      [04-19-24 @ 17:54]   109  TSH 5.06      [04-14-24 @ 07:18]  Lipid: chol 74, TG 70, HDL 33, LDL --      [04-13-24 @ 07:05]

## 2024-04-24 NOTE — PROGRESS NOTE ADULT - NUTRITIONAL ASSESSMENT
This patient has been assessed with a concern for Malnutrition and has been determined to have a diagnosis/diagnoses of Morbid obesity (BMI > 40).    This patient is being managed with:   Diet NPO after Midnight-     NPO Start Date: 23-Apr-2024   NPO Start Time: 23:59  Except Medications  Entered: Apr 23 2024 10:47AM    Diet Regular-  Consistent Carbohydrate {Evening Snack} (CSTCHOSN)  DASH/TLC {Sodium & Cholesterol Restricted} (DASH)  1500mL Fluid Restriction ([id number])  Entered: Apr 19 2024  7:44AM

## 2024-04-24 NOTE — PROGRESS NOTE ADULT - ASSESSMENT
72 y/o F with PMHx of HFrEF (EF 30%), mod AS, known 1st degree AVB and LBBB, HTN, IDDM1, CKD, hypothyroidism, chronic lymphedema, suspected OHS/LELIA on 3L NC home O2 p/w 1 episode of syncope. Recent admission at [facility] (3/29-4/5) for ADHF and DUNCAN s/p diuresis.   Pt states she does not remember her syncopal episode. She remembers walking to the car and getting into it. She denies any preceding symptoms. She was told that she "blacked out" and was not responding for 10-15 seconds with arm shaking. EKG consistent with sinus bradycardia at 58bpm, 1st degree AVB (AUDRA 318ms) with LBBB (QRSd 176). Early AM 4/14, she was noted to have 2:1 AV block. Pt also with e/o Wenckebach on 4/14.  She was on Toprol XL 100mg at home but has not taken it since 4/12.    1) Syncope  2) 2:1 AV block with known 1st degree AVB (Audra 318ms) with LBBB (QRSd 176)  3) HFrEF with LVEF 30%  4) Aortic stenosis, severe   5) Lymphedema with significant LE edema and rash    Recommendations:  - NPO s/p mn for TAVR tomorrow with possibility of Micra (leadless) PPM implant at time of TAVR should patient have evidence of high grade AVB intra operatively.  Ultimately patient may warrant CRT-D placement noting conduction disease and cardiomyopathy with LVEF 30% and LBBB with QRSd 176. However, at this time she is not a candidate for CRT at this time noting infectious risk due to LE swelling/cellulitis and ESRD on HD via temporary HDC.   - Premedication iso contrast allergy prior to TAVR  - ID following for potential infectious etiology of fever. Likely allergic reaction to contrast. Off antibiotics. No further fevers noted  - BB on hold

## 2024-04-24 NOTE — PRE-OP CHECKLIST - SELECT TESTS ORDERED
BMP/CBC/CMP/PT/PTT/INR/Hepatic Function/Type and Screen/Urinalysis/EKG/POCT Blood Glucose You can access the FollowMyHealth Patient Portal offered by [facility] by registering at the following website: [email or web address]. By joining [employer]’s FollowMyHealth portal, you will also be able to view your health information using other applications (apps) compatible with our system.

## 2024-04-24 NOTE — PRE-OP CHECKLIST - TAMPON REMOVED
[address]        Pt Name: Felisha Bautista  MRN: [id number]  [place] 1994  Date of evaluation: 11/4/2020  Provider: Gemma Salmeron MD  PCP: Jennifer Mota MD    This patient was seen and evaluated by the attending physician Gemma Salmeron MD.      [address] [place] [address]       Chief Complaint   Patient presents with    Dysuria    Pelvic Pain       HISTORY OF PRESENT ILLNESS   (Location/Symptom, Timing/Onset, Context/Setting, Quality, Duration, Modifying Factors, Severity)  Note limiting factors. Felisha Bautista is a 23 y.o. female here today with a chief complaint of about 2 weeks of cystitis. She was seen in outpatient urgent care few weeks ago was started on Macrobid but has noticed any improvement since. Notes a lot of burning urination. No vaginal bleeding or discharge. Very low concern for STDs. No nausea vomiting diarrhea no fevers no back pain no flank pain    Nursing Notes were all reviewed and agreed with or any disagreements were addressed  in the HPI. REVIEW OF SYSTEMS    (2-9 systems for level 4, 10 or more for level 5)     Review of Systems    Positives and Pertinent negatives as per HPI. Except as noted abovein the ROS, all other systems were reviewed and negative. PAST MEDICAL HISTORY     Past Medical History:   Diagnosis Date    Chlamydia 07/12/2016    Neisseria gonorrheae 07/12/2016    Trichomonas infection     2013         SURGICAL HISTORY   History reviewed. No pertinent surgical history.       [address]       Discharge Medication List as of 11/4/2020  8:45 AM      CONTINUE these medications which have NOT CHANGED    Details   loratadine (CLARITIN) 10 MG tablet Take 1 tablet by mouth daily, Disp-30 tablet, R-0Print      !! ibuprofen (IBU) 600 MG tablet Take 1 tablet by mouth every 6 hours as needed for Pain, Disp-40 tablet, R-0Print      !! ibuprofen (ADVIL;MOTRIN) 600 MG tablet Take 1 tablet by mouth
every 6 hours as needed for Pain, Disp-30 tablet, R-0Print      ondansetron (ZOFRAN ODT) 4 MG disintegrating tablet Take 1 tablet by mouth every 8 hours as needed for Nausea, Disp-20 tablet, R-0      NONFORMULARY Indications: birth control       !! - Potential duplicate medications found. Please discuss with provider. ALLERGIES     Patient has no known allergies. FAMILYHISTORY     History reviewed. No pertinent family history. SOCIAL HISTORY       Social History     Socioeconomic History    Marital status: Single     Spouse name: None    Number of children: None    Years of education: None    Highest education level: None   Occupational History    None   Social Needs    Financial resource strain: None    Food insecurity     Worry: None     Inability: None    Transportation needs     Medical: None     Non-medical: None   Tobacco Use    Smoking status: Former Smoker     Packs/day: 0.50     Types: Cigarettes    Smokeless tobacco: Never Used   Substance and Sexual Activity    Alcohol use:  Yes    Drug use: No    Sexual activity: Yes     Partners: Male   Lifestyle    Physical activity     Days per week: None     Minutes per session: None    Stress: None   Relationships    Social connections     Talks on phone: None     Gets together: None     Attends Anabaptist service: None     Active member of club or organization: None     Attends meetings of clubs or organizations: None     Relationship status: None    Intimate partner violence     Fear of current or ex partner: None     Emotionally abused: None     Physically abused: None     Forced sexual activity: None   Other Topics Concern    None   Social History Narrative    None       SCREENINGS             PHYSICAL EXAM    (up to 7 for level 4, 8 or more for level 5)     ED Triage Vitals   BP Temp Temp src Pulse Resp SpO2 Height Weight   -- -- -- -- -- -- -- --       Physical Exam    General Appearance:  Alert, cooperative, no distress,
appears stated age. Head:  Normocephalic, without obvious abnormality, atraumatic. Eyes:  conjunctiva/corneas clear, EOM's intact. Sclera anicteric. ENT: Mucous membranes moist.   Neck: Supple, symmetrical, trachea midline, no adenopathy. No jugular venous distention. Lungs:   No Respiratory Distress. Chest Wall:  Atraumatic   Heart:  RRR no m/c/g/r   Abdomen:   Soft, NT, ND   Extremities:  Full range of motion. Pulses: Symmetric x4   Skin:  No rashes or lesions to exposed skin. Neurologic: Alert and oriented X 3. Motor grossly normal.  Speech clear.           DIAGNOSTIC RESULTS   LABS:    Labs Reviewed   WET PREP, GENITAL - Abnormal; Notable for the following components:       Result Value    Clue Cells, Wet Prep 3+ (*)     All other components within normal limits    Narrative:     Performed at:  [facility] [address],  [place] [address]   Phone [phone] - Abnormal; Notable for the following components:    Color, UA ORANGE (*)     Clarity, UA SL CLOUDY (*)     Glucose, Ur Color Interfer (*)     Bilirubin Urine Color Interfer (*)     Ketones, Urine Color Interfer (*)     Blood, Urine Color Interfer (*)     pH, UA Color Interfer (*)     Protein, UA Color Interfer (*)     Urobilinogen, Urine Color Interfer (*)     Nitrite, Urine Color Interfer (*)     Leukocyte Esterase, Urine Color Interfer (*)     All other components within normal limits    Narrative:     Performed at:  [facility]  [address],  [place] [address]   Phone [phone]   MICROSCOPIC URINALYSIS - Abnormal; Notable for the following components:    WBC, UA  (*)     RBC, UA 21-50 (*)     Epithelial Cells, UA 6-10 (*)     Bacteria, UA 3+ (*)     All other components within normal limits    Narrative:     Performed at:  [facility] [address],  [place] [address]
Phone [phone]   CULTURE, URINE   C.TRACHOMATIS N.GONORRHOEAE DNA   PREGNANCY, URINE    Narrative:     Performed at:  [facility]) [facility]  [address]   Phone [phone]       All other labs were within normal range or not returned as of this dictation. EKG: All EKG's are interpreted by the Emergency Department Physician in the absence of a cardiologist.  Please see their note for interpretation of EKG. RADIOLOGY:   Non-plain film images such as CT, Ultrasound and MRI are read by the radiologist. Plain radiographic images are visualized andpreliminarily interpreted by the  ED Provider with the below findings:        Interpretation pert Radiologist below, if available at the time of this note:    No orders to display     No results found. PROCEDURES   Unless otherwise noted below, none     Procedures    CRITICAL CARE TIME   N/A    CONSULTS:  None      EMERGENCY DEPARTMENT COURSE and DIFFERENTIAL DIAGNOSIS/MDM:   Vitals:    Vitals:    11/04/20 0800   BP: 117/86   Pulse: 90   Resp: 16   Temp: 98 °F (36.7 °C)   TempSrc: Oral   SpO2: 100%   Weight: 131 lb 3.2 oz (59.5 kg)       Patient was given thefollowing medications:  Medications   cefTRIAXone (ROCEPHIN) injection 250 mg (250 mg Intramuscular Given 11/4/20 0823)   azithromycin (ZITHROMAX) tablet 1,000 mg (1,000 mg Oral Given 11/4/20 0822)   ondansetron (ZOFRAN-ODT) disintegrating tablet 4 mg (4 mg Oral Given 11/4/20 4262)       71-year-old female persistent cystitis. Concern for STDs. Will obtain swabs and treat emperically. Gestalt for cystitis. Switch from Macrobid to Keflex. FINAL IMPRESSION      1.  Acute cystitis without hematuria          DISPOSITION/PLAN   DISPOSITION Decision To Discharge 11/04/2020 08:05:53 AM      PATIENT REFERREDTO:  Kristen Kearney MD  [address] [facility]. [place], [address] [place] [address]  [phone]            DISCHARGE
MEDICATIONS:  Discharge Medication List as of 11/4/2020  8:45 AM      START taking these medications    Details   cephALEXin (KEFLEX) 500 MG capsule Take 1 capsule by mouth 4 times daily for 10 days, Disp-40 capsule,R-0Print      metroNIDAZOLE (FLAGYL) 500 MG tablet Take 1 tablet by mouth 2 times daily for 7 days, Disp-14 tablet,R-0Print             DISCONTINUED MEDICATIONS:  Discharge Medication List as of 11/4/2020  8:45 AM                 (Please note that portions ofthis note were completed with a voice recognition program.  Efforts were made to edit the dictations but occasionally words are mis-transcribed.)    Daniel Newman MD (electronically signed)           Daniel Newman MD  11/04/20 [address] MD  11/04/20 1619
n/a
n/a

## 2024-04-24 NOTE — PROGRESS NOTE ADULT - ASSESSMENT
73F w HFrEF (EF 30%), mod AS, known LBBB, HTN, IDDM1, CKD, hypothyroidism, chronic lymphedema, p/w 1 episode of syncope with R arm jerking, likely 2/2 severe symptomatic AS. CTA imagings performed for TAVR eval. C/b febrile episode with hypotension, likely allergic contrast reaction vs infectious, pending workup. C/b contrast-related renal failure requiring temporary HD. Plan for TAVR 4/24.

## 2024-04-24 NOTE — PROGRESS NOTE ADULT - SUBJECTIVE AND OBJECTIVE BOX
MR#[id number]  PATIENT NAME:JOHNATHAN LOPEZ    DATE OF SERVICE: 04-24-24 @ 06:30  Patient was seen and examined by Cuco Tubbs MD on    04-24-24 @ 06:30 .  Interim events noted.Consultant notes ,Labs,Telemetry reviewed by me       Covering for [facility] -Howard Sterling, Carlos Luong, Herman Alston  Office Number: [phone]           HOSPITAL COURSE: HPI:  73F w HFrEF (EF 30%), mod AS, known LBBB, HTN, IDDM1, CKD, hypothyroidism, chronic lymphedema, suspected OHS/LELIA on 3L NC home O2 p/w 1 episode of syncope. Recent admission at [facility] (3/29-4/5) for ADHF and DUNCAN s/p diuresis, discharged with new home O2 3L NC suspecting OHS/LELIA pending outpatient w/u. Since discharge a week ago, she has been staying at home with   Pt had sob walking to car. Once in car, she was still breathing heavily. Partner noticed pt was not responding to verbal stimuli for 10-15sec and witnessed R arm jerking. Pt gained consciousness quickly without postictal symptoms. Pt went to Cardiologist Dr. Nathan who recommended pt to come to ED. No fever, cp, abd pain, n/v. Leg swelling is improved from prior. Pt on torsemide 40mg which she has been taking. Pt was discharged on 3LNC which she is currently on. Patient reports she did not take Farxiga that she was discharged on as she was concerned about side effects.     In ED, patient was found afebrile, hemodynamically stable, breathing well on 3L NC. Initial labs notable for mild hyponatremia, DUNCAN on CKD, elevated proBNP and elevated pCO2 both improved from prior. (12 Apr 2024 16:31)            INTERIM EVENTS:Patient seen at bedside ,interim events noted.Awake alert Micra PPM deferred,will reevaluate today during planned TAVR to determine need forMicra PPM vs delayedCRT_P placement in setting of LV dysfunction aldunderlying LBBB,no justo events havebeen noted off BBlockers      PMH -reviewed admission note, no change since admission  HEART FAILURE: Acute[ x]Chronic[x ] Systolic[ x] Diastolic[ ] Combined Systolic and Diastolic[ ]  CAD[ ] CABG[ ] PCI[ ]  DEVICES[ ] PPM[ ] ICD[ ] ILR[ ]  ATRIAL FIBRILLATION[ ] Paroxysmal[ ] Permanent[ ] CHADS2-[  ]  DUNCAN[ ] CKD1[ ] CKD2[ ] CKD3[ ] CKD4[ ] ESRD[x ]  COPD[ ] HTN[x ]   DM[x ] Type1[ ] Type 2[x ]   CVA[ ] Paresis[ ]    AMBULATION: Assisted[ ] Cane/walker[x ] Independent[ ]    MEDICATIONS  (STANDING):  ascorbic acid 500 milliGRAM(s) Oral daily  aspirin  chewable 81 milliGRAM(s) Oral daily  atorvastatin 80 milliGRAM(s) Oral at bedtime  calamine/zinc oxide Lotion 1 Application(s) Topical three times a day  cetirizine 10 milliGRAM(s) Oral daily  chlorhexidine 0.12% Liquid 30 milliLiter(s) Swish and Spit once  chlorhexidine 4% Liquid 1 Application(s) Topical <User Schedule>  clopidogrel Tablet 75 milliGRAM(s) Oral daily  diphenhydrAMINE Injectable 50 milliGRAM(s) IV Push once  ferrous    sulfate 325 milliGRAM(s) Oral two times a day  glucagon  Injectable 1 milliGRAM(s) IntraMuscular once  insulin glargine Injectable (LANTUS) 18 Unit(s) SubCutaneous at bedtime  insulin lispro (ADMELOG) corrective regimen sliding scale   SubCutaneous at bedtime  insulin lispro (ADMELOG) corrective regimen sliding scale   SubCutaneous three times a day before meals  insulin lispro Injectable (ADMELOG) 4 Unit(s) SubCutaneous three times a day before meals  levothyroxine 75 MICROGram(s) Oral daily  multivitamin 1 Tablet(s) Oral daily  nystatin    Suspension 705062 Unit(s) Oral four times a day  nystatin/triamcinolone Cream 1 Application(s) Topical every 12 hours  petrolatum white Ointment 1 Application(s) Topical three times a day  predniSONE   Tablet 50 milliGRAM(s) Oral once  senna 2 Tablet(s) Oral at bedtime  triamcinolone 0.1% Ointment 1 Application(s) Topical two times a day  vancomycin  IVPB 1000 milliGRAM(s) IV Intermittent once    MEDICATIONS  (PRN):  dextrose Oral Gel 15 Gram(s) Oral once PRN Blood Glucose LESS THAN 70 milliGRAM(s)/deciliter  metoclopramide Injectable 10 milliGRAM(s) IV Push every 6 hours PRN nausea vomiting  polyethylene glycol 3350 17 Gram(s) Oral two times a day PRN Constipation  sodium chloride 0.9% lock flush 10 milliLiter(s) IV Push every 1 hour PRN Pre/post blood products, medications, blood draw, and to maintain line patency            REVIEW OF SYSTEMS:  Constitutional: [ ] fever, [ ]weight loss,  [ ]fatigue [ ]weight gain  Eyes: [ ] visual changes  Respiratory: [ ]shortness of breath;  [ ] cough, [ ]wheezing, [ ]chills, [ ]hemoptysis  Cardiovascular: [ ] chest pain, [ ]palpitations, [ ]dizziness,  [ ]leg swelling[ ]orthopnea[ ]PND  Gastrointestinal: [ ] abdominal pain, [ ]nausea, [ ]vomiting,  [ ]diarrhea [ ]Constipation [ ]Melena  Genitourinary: [ ] dysuria, [ ] hematuria [ ][place]za  Neurologic: [ ] headaches [ ] tremors[ ]weakness [ ]Paralysis Right[ ] Left[ ]  Skin: [ ] itching, [ ]burning, [ ] rashes  Endocrine: [ ] heat or cold intolerance  Musculoskeletal: [ ] joint pain or swelling; [ ] muscle, back, or extremity pain  Psychiatric: [ ] depression, [ ]anxiety, [ ]mood swings, or [ ]difficulty sleeping  Hematologic: [ ] easy bruising, [ ] bleeding gums    [ ] All remaining systems negative except as per above.   [ ]Unable to obtain.  [x] No change in ROS since admission      Vital Signs Last 24 Hrs  T(C): 36.8 (24 Apr 2024 04:00), Max: 36.9 (23 Apr 2024 20:15)  T(F): 98.2 (24 Apr 2024 04:00), Max: 98.4 (23 Apr 2024 20:15)  HR: 80 (24 Apr 2024 04:00) (77 - 90)  BP: 105/64 (24 Apr 2024 04:00) (88/45 - 114/62)  BP(mean): 78 (24 Apr 2024 04:00) (78 - 78)  RR: 18 (24 Apr 2024 04:00) (16 - 18)  SpO2: 98% (24 Apr 2024 04:00) (96% - 100%)    Parameters below as of 24 Apr 2024 04:00  Patient On (Oxygen Delivery Method): nasal cannula  O2 Flow (L/min): 3    I&O's Summary    22 Apr 2024 07:01  -  23 Apr 2024 07:00  --------------------------------------------------------  IN: 1120 mL / OUT: 2220 mL / NET: -1100 mL    23 Apr 2024 07:01  -  24 Apr 2024 06:30  --------------------------------------------------------  IN: 870 mL / OUT: 1320 mL / NET: -450 mL        PHYSICAL EXAM:  General: No acute distress BMI-34  HEENT: EOMI, PERRL  Neck: Supple, [ ] JVD  Lungs: Equal air entry bilaterally; [ ] rales [ ] wheezing [ ] rhonchi  Heart: Regular rate and rhythm; [x ] murmur   2/6 [ x] systolic [ ] diastolic [ ] radiation[ ] rubs [ ]  gallops  Abdomen: Nontender, bowel sounds present  Extremities: No clubbing, cyanosis, [ x] lymphedema [ ]Pulses  equal and intact  Nervous system:  Alert & Oriented X3, no focal deficits  Psychiatric: Normal affect  Skin: No rashes or lesions    LABS:  04-23    135  |  95<L>  |  49<H>  ----------------------------<  107<H>  4.1   |  28  |  4.09<H>    Ca    8.3<L>      23 Apr 2024 06:53  Phos  2.8     04-23  Mg     2.1     04-23    TPro  5.9<L>  /  Alb  2.9<L>  /  TBili  0.6  /  DBili  x   /  AST  10  /  ALT  9<L>  /  AlkPhos  53  04-23    Creatinine Trend: 4.09<--, 4.75<--, 4.02<--, 4.14<--, 4.35<--, 4.55<--                        10.3   9.97  )-----------( 155      ( 23 Apr 2024 06:56 )             34.3              TTE W or WO Ultrasound Enhancing Agent (04.16.24 @ 15:35) >  FINDINGS:     Aortic Valve:  There is severe aortic stenosis. The peak transaortic velocity is 4.29 m/s, peak transaortic gradient is 73.6 mmHg and mean transaortic gradient is 43.0 mmHg with an LVOT/aortic valve VTI ratio of 0.21. The aortic valve area is estimated at 0.93 cm² by the continuity equation. There is trace aortic regurgitation.       TTE W or WO Ultrasound Enhancing Agent (04.01.24 @ 10:59) >  CONCLUSIONS:      1. Technically difficult image quality.   2. Left ventricular systolic function is moderately to severely decreased with an ejection fraction of 30 % by Nunes's method of disks.   3. Mildto moderate left ventricular hypertrophy.   4. The right ventricle is not well visualized. Based on visual assessment, the right ventricle appears mildly enlarged. mildly reduced systolic function.   5. The left atrium is mildly dilated.   6. Trace aortic regurgitation.   7. Mild mitral regurgitation.   8. Moderate to severe aortic stenosis. There is a Vmax of 3.66m/s, Mean gradient of 26mmHg and a DI of 0.19. The JUAN DANIEL is calculated as 0.61cm2 by continuity. This likely represents low flow low gradient AS.   9. Estimated pulmonary artery systolic pressure is 59 mmHg, consistent with moderate pulmonary hypertension.  10. The inferior vena cava is dilated measuring 2.60 cm in diameter, (dilated >2.1cm) with normal inspiratory collapse (normal >50%) consistent with mildly elevated right atrial pressure (~8, range 5-10mmHg).

## 2024-04-24 NOTE — CHART NOTE - NSCHARTNOTEFT_GEN_A_CORE
CICU Accept Note  ------------------------    CHIEF COMPLAINT:     HPI / INTERVAL HISTORY:    73F w HFrEF (EF 30%), mod AS, known LBBB, HTN, IDDM1, CKD, hypothyroidism, chronic lymphedema, suspected OHS/LELIA on 3L NC home O2 p/w 1 episode of syncope. Recent admission at [facility] (3/29-4/5) for ADHF and DUNCAN s/p diuresis, discharged with new home O2 3L NC suspecting OHS/LELIA pending outpatient w/u. Since discharge a week ago, she has been staying at home. Pt had sob walking to car. Once in car, she was still breathing heavily. Partner noticed pt was not responding to verbal stimuli for 10-15sec and witnessed R arm jerking. Pt gained consciousness quickly without postictal symptoms. Pt went to Cardiologist Dr. Nathan who recommended pt to come to ED. No fever, cp, abd pain, n/v. Leg swelling is improved from prior. Pt on torsemide 40mg which she has been taking. Pt was discharged on 3LNC which she is currently on. Patient reports she did not take Farxiga that she was discharged on as she was concerned about side effects.     In ED, patient was found afebrile, hemodynamically stable, breathing well on 3L NC. Initial labs notable for mild hyponatremia, DUNCAN on CKD, elevated proBNP and elevated pCO2 both improved from prior. (12 Apr 2024 16:31)    Hospital Course:   Syncope believed to be due to moderate to severe AS. CTA performed for TAVR eval and pt had febrile episode with hypotension, likely allergic contrast reaction vs infectious. Further complicated by contrast-related renal failure requiring temporary HD. Pt went for TAVR on 4/24 and had 2 episodes of vfib, shocked x2, and then pt became hypoxic and thought to have flashed. Remains intubated and brought to the CICU for further management.         REVIEW OF SYSTEMS:  Unable to assess ROS because pt intubated and sedated.       PMH/PSH:  PAST MEDICAL & SURGICAL HISTORY:  Hypertension      Diabetes      Lymphedema      H/O left bundle branch block      History of left bundle branch block (LBBB)      Systolic heart failure, chronic      Type 1 diabetes      H/O cataract  2020      History of surgical removal of meniscus of knee  left in 1971      Frozen shoulder  2000      H/O Achilles tendon repair  lengthened bilaterally, 2000      Fractured skull  1968        FAMILY HISTORY:  FAMILY HISTORY:  Family history of CVA  mom, age 57        SOCIAL HISTORY:  Smoking: [  ] Never Smoked  [  ] Former Smoker (# packs x # years), quit   [  ] Current Smoker (# packs x # years)  Substance Use: [  ] None; [   ] Yes:  EtOH Use: [   ] None; [   ] Rare; [   ] Social; [   ] Frequent:   Marital Status: [  ] Single  [  ]   [  ]   [  ]   Dependents:  Occupation:  Barriers to treatment:   Advance Directives:     HOME MEDICATIONS:  Home Medications:  aspirin 81 mg oral tablet, chewable: 1 tab(s) orally once a day (12 Apr 2024 17:37)  atorvastatin 80 mg oral tablet: 1 tab(s) orally once a day (at bedtime) (12 Apr 2024 17:37)  cholecalciferol 100 mcg (4000 intl units) oral tablet: 1 tab(s) orally once a day (16 Apr 2024 12:35)  ferrous sulfate 324 mg (65 mg elemental iron) oral tablet: 1 tab(s) orally 2 times a day (12 Apr 2024 17:37)  Lantus 100 units/mL subcutaneous solution: 33 unit(s) subcutaneous once a day (at bedtime) (17 Apr 2024 23:57)  levothyroxine 75 mcg (0.075 mg) oral tablet: 1 tab(s) orally once a day (12 Apr 2024 17:37)  metoprolol succinate 100 mg oral tablet, extended release: 1 tab(s) orally once a day (12 Apr 2024 17:37)  Multiple Vitamins oral tablet: 1 tab(s) orally once a day (16 Apr 2024 12:27)  NovoLOG FlexPen 100 units/mL injectable solution: injectable 3 times a day per sliding scale regimen. MAX dose 40 units (16 Apr 2024 12:26)  potassium chloride 10 mEq oral tablet, extended release: 2 tab(s) orally once a day (16 Apr 2024 12:26)  torsemide 20 mg oral tablet: 1 tab(s) orally 2 times a day (16 Apr 2024 12:26)      ALLERGIES:  Allergies    contrast media (iodine-based) (Fever; Vomiting; Flushing; Hypotension; Rash; Stomach Upset)  Ativan (Rash; Urticaria)  penicillins (Hives (Mod to Severe); Anaphylaxis (Mod to Severe); Short breath (Mod to Severe); Angioedema (Mod to Severe))        OBJECTIVE:  ICU Vital Signs Last 24 Hrs  T(C): 36.6 (24 Apr 2024 12:03), Max: 36.9 (23 Apr 2024 20:15)  T(F): 97.9 (24 Apr 2024 11:22), Max: 98.4 (23 Apr 2024 20:15)  HR: 109 (24 Apr 2024 12:03) (80 - 109)  BP: 102/63 (24 Apr 2024 12:03) (88/45 - 114/62)  BP(mean): 78 (24 Apr 2024 12:03) (78 - 78)  ABP: --  ABP(mean): --  RR: 18 (24 Apr 2024 12:03) (16 - 18)  SpO2: 99% (24 Apr 2024 12:03) (98% - 100%)    O2 Parameters below as of 24 Apr 2024 12:03    O2 Flow (L/min): 3        Mode: AC/ CMV (Assist Control/ Continuous Mandatory Ventilation), RR (machine): 16, TV (machine): 600, FiO2: 100, PEEP: 10, PIP: 27    04-23 @ 07:01 - 04-24 @ 07:00  --------------------------------------------------------  IN: 870 mL / OUT: 1320 mL / NET: -450 mL    04-24 @ 07:01 - 04-24 @ 17:11  --------------------------------------------------------  IN: 0 mL / OUT: 50 mL / NET: -50 mL      CAPILLARY BLOOD GLUCOSE      POCT Blood Glucose.: 220 mg/dL (24 Apr 2024 07:24)      PHYSICAL EXAM  GENERAL: No acute distress  NEURO: Intubated and sedated  HEENT: Pupil equal, round, and reactive to light, moist mucus membrane  RESP: Clear to auscultation bilateral  CVS: S1/S2 present, normal rate and rhythm  ABD: Soft, nondistended   EXT: B/l LE lymphedema   SKIN: Psoriatic lesions on the b/l upper and lower extremities         HOSPITAL MEDICATIONS:  MEDICATIONS  (STANDING):  chlorhexidine 0.12% Liquid 15 milliLiter(s) Oral Mucosa every 12 hours  chlorhexidine 2% Cloths 1 Application(s) Topical daily  dexMEDEtomidine Infusion 0.5 MICROgram(s)/kG/Hr (18.7 mL/Hr) IV Continuous <Continuous>  vasopressin Infusion 0.06 Unit(s)/Min (9 mL/Hr) IV Continuous <Continuous>    MEDICATIONS  (PRN):      LABS:                        11.2   11.39 )-----------( 137      ( 24 Apr 2024 08:12 )             36.4     Hgb Trend: 11.2<--, 10.3<--, 10.4<--, 10.5<--, 10.2<--  04-24    133<L>  |  96  |  43<H>  ----------------------------<  211<H>  5.2   |  21<L>  |  3.49<H>    Ca    8.4      24 Apr 2024 08:12  Phos  3.5     04-24  Mg     2.1     04-24    TPro  6.3  /  Alb  3.0<L>  /  TBili  0.7  /  DBili  x   /  AST  13  /  ALT  8<L>  /  AlkPhos  60  04-24    Creatinine Trend: 3.49<--, 4.09<--, 4.75<--, 4.02<--, 4.14<--, 4.35<--  PT/INR - ( 24 Apr 2024 08:12 )   PT: 12.4 sec;   INR: 1.19 ratio         PTT - ( 24 Apr 2024 08:12 )  PTT:27.3 sec  Urinalysis Basic - ( 24 Apr 2024 08:12 )    Color: x / Appearance: x / SG: x / pH: x  Gluc: 211 mg/dL / Ketone: x  / Bili: x / Urobili: x   Blood: x / Protein: x / Nitrite: x   Leuk Esterase: x / RBC: x / WBC x   Sq Epi: x / Non Sq Epi: x / Bacteria: x        ASSESSMENT & PLAN    73F w PMH HFrEF (EF 30%), mod AS, known LBBB, HTN, IDDM1, CKD, hypothyroidism, chronic lymphedema, p/w 1 episode of syncope with R arm jerking, likely 2/2 severe symptomatic AS. CTA performed for TAVR eval. Course complicated by febrile episode with hypotension, likely allergic contrast reaction and renal failure requiring temporary HD. TAVR on 4/24 c/b VT -> shock -> VFib -> shock and pt now in 1deg AVB w/ PACs. Hypoxic likely 2/2 flash pulmonary edema. Pt intubated and requiring pressor support.       NEURO:  - Intubated  - Sedated w/ Precedex    - A&Ox3 at baseline      RESP:  Possible Flash Pulmonary Edema  - Mechanical vent 600/16/10/100%  - f/u CXR  - SBTs  - Plan to extubate 4/25      CARDIO:  1st degree AVB w/ PACs  Severe AS s/p TAVR  - f/u TTE  - Asa  - Atorvastatin     Vasolplegia 2/2 sedation   - on Vaso, wean as tolerated       RENAL:  DUNCAN on CKD  - Requiring HD  - Acutely remove fluid w/ HD, then CRRT o/n  - Monitor urine output  - Monitor electrolytes      GASTRO:  - NPO  - Senna and Miralax       ENDO:  IDDM1  - ISS    Hypothyroidism  - home levothyroxine       SKIN:  Acute generalized exanthematous pustulosis due to drug  -     Lymphedema   - Elevate legs  - Compression stockings, ACE wrapping CICU Accept Note  ------------------------    CHIEF COMPLAINT:     HPI / INTERVAL HISTORY:    73F w HFrEF (EF 30%), mod AS, known LBBB, HTN, IDDM1, CKD, hypothyroidism, chronic lymphedema, suspected OHS/LELIA on 3L NC home O2 p/w 1 episode of syncope. Recent admission at [facility] (3/29-4/5) for ADHF and DUNCAN s/p diuresis, discharged with new home O2 3L NC suspecting OHS/LELIA pending outpatient w/u. Since discharge a week ago, she has been staying at home. Pt had sob walking to car. Once in car, she was still breathing heavily. Partner noticed pt was not responding to verbal stimuli for 10-15sec and witnessed R arm jerking. Pt gained consciousness quickly without postictal symptoms. Pt went to Cardiologist Dr. Nathan who recommended pt to come to ED. No fever, cp, abd pain, n/v. Leg swelling is improved from prior. Pt on torsemide 40mg which she has been taking. Pt was discharged on 3LNC which she is currently on. Patient reports she did not take Farxiga that she was discharged on as she was concerned about side effects.     In ED, patient was found afebrile, hemodynamically stable, breathing well on 3L NC. Initial labs notable for mild hyponatremia, DUNCAN on CKD, elevated proBNP and elevated pCO2 both improved from prior. (12 Apr 2024 16:31)    Hospital Course:   Syncope believed to be due to moderate to severe AS. CTA performed for TAVR eval and pt had febrile episode with hypotension, likely allergic contrast reaction vs infectious. Further complicated by contrast-related renal failure requiring temporary HD. Pt went for TAVR on 4/24 c/b VT -> shock -> VFib -> shock and pt now in 1deg AVB w/ PACs and then pt became hypoxic and thought to have flashed. Remains intubated and brought to the CICU for further management.         REVIEW OF SYSTEMS:  Unable to assess ROS because pt intubated and sedated.       PMH/PSH:  PAST MEDICAL & SURGICAL HISTORY:  Hypertension      Diabetes      Lymphedema      H/O left bundle branch block      History of left bundle branch block (LBBB)      Systolic heart failure, chronic      Type 1 diabetes      H/O cataract  2020      History of surgical removal of meniscus of knee  left in 1971      Frozen shoulder  2000      H/O Achilles tendon repair  lengthened bilaterally, 2000      Fractured skull  1968        FAMILY HISTORY:  FAMILY HISTORY:  Family history of CVA  mom, age 57        SOCIAL HISTORY:  Smoking: [  ] Never Smoked  [  ] Former Smoker (# packs x # years), quit   [  ] Current Smoker (# packs x # years)  Substance Use: [  ] None; [   ] Yes:  EtOH Use: [   ] None; [   ] Rare; [   ] Social; [   ] Frequent:   Marital Status: [  ] Single  [  ]   [  ]   [  ]   Dependents:  Occupation:  Barriers to treatment:   Advance Directives:     HOME MEDICATIONS:  Home Medications:  aspirin 81 mg oral tablet, chewable: 1 tab(s) orally once a day (12 Apr 2024 17:37)  atorvastatin 80 mg oral tablet: 1 tab(s) orally once a day (at bedtime) (12 Apr 2024 17:37)  cholecalciferol 100 mcg (4000 intl units) oral tablet: 1 tab(s) orally once a day (16 Apr 2024 12:35)  ferrous sulfate 324 mg (65 mg elemental iron) oral tablet: 1 tab(s) orally 2 times a day (12 Apr 2024 17:37)  Lantus 100 units/mL subcutaneous solution: 33 unit(s) subcutaneous once a day (at bedtime) (17 Apr 2024 23:57)  levothyroxine 75 mcg (0.075 mg) oral tablet: 1 tab(s) orally once a day (12 Apr 2024 17:37)  metoprolol succinate 100 mg oral tablet, extended release: 1 tab(s) orally once a day (12 Apr 2024 17:37)  Multiple Vitamins oral tablet: 1 tab(s) orally once a day (16 Apr 2024 12:27)  NovoLOG FlexPen 100 units/mL injectable solution: injectable 3 times a day per sliding scale regimen. MAX dose 40 units (16 Apr 2024 12:26)  potassium chloride 10 mEq oral tablet, extended release: 2 tab(s) orally once a day (16 Apr 2024 12:26)  torsemide 20 mg oral tablet: 1 tab(s) orally 2 times a day (16 Apr 2024 12:26)      ALLERGIES:  Allergies    contrast media (iodine-based) (Fever; Vomiting; Flushing; Hypotension; Rash; Stomach Upset)  Ativan (Rash; Urticaria)  penicillins (Hives (Mod to Severe); Anaphylaxis (Mod to Severe); Short breath (Mod to Severe); Angioedema (Mod to Severe))        OBJECTIVE:  ICU Vital Signs Last 24 Hrs  T(C): 36.6 (24 Apr 2024 12:03), Max: 36.9 (23 Apr 2024 20:15)  T(F): 97.9 (24 Apr 2024 11:22), Max: 98.4 (23 Apr 2024 20:15)  HR: 109 (24 Apr 2024 12:03) (80 - 109)  BP: 102/63 (24 Apr 2024 12:03) (88/45 - 114/62)  BP(mean): 78 (24 Apr 2024 12:03) (78 - 78)  ABP: --  ABP(mean): --  RR: 18 (24 Apr 2024 12:03) (16 - 18)  SpO2: 99% (24 Apr 2024 12:03) (98% - 100%)    O2 Parameters below as of 24 Apr 2024 12:03    O2 Flow (L/min): 3        Mode: AC/ CMV (Assist Control/ Continuous Mandatory Ventilation), RR (machine): 16, TV (machine): 600, FiO2: 100, PEEP: 10, PIP: 27    04-23 @ 07:01 - 04-24 @ 07:00  --------------------------------------------------------  IN: 870 mL / OUT: 1320 mL / NET: -450 mL    04-24 @ 07:01 - 04-24 @ 17:11  --------------------------------------------------------  IN: 0 mL / OUT: 50 mL / NET: -50 mL      CAPILLARY BLOOD GLUCOSE      POCT Blood Glucose.: 220 mg/dL (24 Apr 2024 07:24)      PHYSICAL EXAM  GENERAL: No acute distress  NEURO: Intubated and sedated  HEENT: Pupil equal, round, and reactive to light, moist mucus membrane  RESP: Clear to auscultation bilateral  CVS: S1/S2 present, normal rate and rhythm  ABD: Soft, nondistended   EXT: B/l LE lymphedema   SKIN: Psoriatic lesions on the b/l upper and lower extremities         HOSPITAL MEDICATIONS:  MEDICATIONS  (STANDING):  chlorhexidine 0.12% Liquid 15 milliLiter(s) Oral Mucosa every 12 hours  chlorhexidine 2% Cloths 1 Application(s) Topical daily  dexMEDEtomidine Infusion 0.5 MICROgram(s)/kG/Hr (18.7 mL/Hr) IV Continuous <Continuous>  vasopressin Infusion 0.06 Unit(s)/Min (9 mL/Hr) IV Continuous <Continuous>    MEDICATIONS  (PRN):      LABS:                        11.2   11.39 )-----------( 137      ( 24 Apr 2024 08:12 )             36.4     Hgb Trend: 11.2<--, 10.3<--, 10.4<--, 10.5<--, 10.2<--  04-24    133<L>  |  96  |  43<H>  ----------------------------<  211<H>  5.2   |  21<L>  |  3.49<H>    Ca    8.4      24 Apr 2024 08:12  Phos  3.5     04-24  Mg     2.1     04-24    TPro  6.3  /  Alb  3.0<L>  /  TBili  0.7  /  DBili  x   /  AST  13  /  ALT  8<L>  /  AlkPhos  60  04-24    Creatinine Trend: 3.49<--, 4.09<--, 4.75<--, 4.02<--, 4.14<--, 4.35<--  PT/INR - ( 24 Apr 2024 08:12 )   PT: 12.4 sec;   INR: 1.19 ratio         PTT - ( 24 Apr 2024 08:12 )  PTT:27.3 sec  Urinalysis Basic - ( 24 Apr 2024 08:12 )    Color: x / Appearance: x / SG: x / pH: x  Gluc: 211 mg/dL / Ketone: x  / Bili: x / Urobili: x   Blood: x / Protein: x / Nitrite: x   Leuk Esterase: x / RBC: x / WBC x   Sq Epi: x / Non Sq Epi: x / Bacteria: x        ASSESSMENT & PLAN    73F w PMH HFrEF (EF 30%), mod AS, known LBBB, HTN, IDDM1, CKD, hypothyroidism, chronic lymphedema, p/w 1 episode of syncope with R arm jerking, likely 2/2 severe symptomatic AS. CTA performed for TAVR eval. Course complicated by febrile episode with hypotension, likely allergic contrast reaction and renal failure requiring temporary HD. TAVR on 4/24 c/b VT -> shock -> VFib -> shock and pt now in 1deg AVB w/ PACs. Hypoxic likely 2/2 flash pulmonary edema. Pt intubated and requiring pressor support.       NEURO:  - Intubated  - Sedated w/ Precedex    - A&Ox3 at baseline      RESP:  Possible Flash Pulmonary Edema  - Mechanical vent 600/16/10/100%  - f/u CXR  - SBTs  - Plan to extubate 4/25      CARDIO:  1st degree AVB w/ PACs  Severe AS s/p TAVR  - f/u TTE  - Asa  - Atorvastatin     Vasolplegia 2/2 sedation   - on Vaso, wean as tolerated       RENAL:  DUNCAN on CKD  - Requiring HD  - Acutely remove fluid w/ HD, then CRRT o/n  - Monitor urine output  - Monitor electrolytes      GASTRO:  - NPO  - Senna and Miralax       ENDO:  IDDM1  - ISS    Hypothyroidism  - home levothyroxine       SKIN:  Acute generalized exanthematous pustulosis due to drug  - Calamine  - Vaseline    Lymphedema   - Elevate legs  - Compression stockings, ACE wrapping    Oral Candidiasis  - Nystatin       ID:  - No active issues

## 2024-04-25 NOTE — PROCEDURE NOTE - NSICDXPROCEDURE_GEN_ALL_CORE_FT
PROCEDURES:  Insertion, catheter, central venous, non-tunneled, age 5 years or older 24-Apr-2024 19:03:44  Mesh, Ravinder  Insertion, temporary transvenous pacing electrode lead 25-Apr-2024 06:57:58  Becka Martinez  
PROCEDURES:  Insertion, catheter, central venous, non-tunneled, age 5 years or older 24-Apr-2024 19:03:44  Mesh, Ravinder  US guided vascular access 24-Apr-2024 19:03:56  Mesh, Ravinder  
PROCEDURES:  Insertion, arterial line, percutaneous 24-Apr-2024 19:00:02  Mesh, Ravinder  
PROCEDURES:  Insertion, midline catheter 25-Apr-2024 13:08:41  Sally White  
PROCEDURES:  Insertion, temporary transvenous pacing electrode lead 25-Apr-2024 06:57:58  Becka Martinez

## 2024-04-25 NOTE — PROGRESS NOTE ADULT - SUBJECTIVE AND OBJECTIVE BOX
CHIEF COMPLAINT:  Patient is a 73y old female who presents with a chief complaint of Syncope (24 Apr 2024 19:26)    INTERVAL HISTORY:  - s/p TAVR c/b flash pulmonary edema requiring intubation    REVIEW OF SYSTEMS:  [X] Unable to assess ROS because pt. intubated, sedated.    MEDICATIONS:  MEDICATIONS  (STANDING):  ascorbic acid 500 milliGRAM(s) Oral daily  aspirin  chewable 81 milliGRAM(s) Oral daily  atorvastatin 80 milliGRAM(s) Oral at bedtime  calamine/zinc oxide Lotion 1 Application(s) Topical three times a day  chlorhexidine 0.12% Liquid 15 milliLiter(s) Oral Mucosa every 12 hours  chlorhexidine 2% Cloths 1 Application(s) Topical daily  CRRT Treatment    <Continuous>  ferrous    sulfate 325 milliGRAM(s) Oral two times a day  insulin glargine Injectable (LANTUS) 8 Unit(s) SubCutaneous at bedtime  insulin lispro (ADMELOG) corrective regimen sliding scale   SubCutaneous every 6 hours  levothyroxine 75 MICROGram(s) Oral daily  multivitamin 1 Tablet(s) Oral daily  nystatin    Suspension 960622 Unit(s) Oral four times a day  petrolatum white Ointment 1 Application(s) Topical three times a day  PrismaSATE Dialysate BGK 4 / 2.5 5000 milliLiter(s) (1400 mL/Hr) CRRT <Continuous>  PrismaSOL Filtration BGK 0 / 2.5 5000 milliLiter(s) (800 mL/Hr) CRRT <Continuous>  PrismaSOL Filtration BGK 4 / 2.5 5000 milliLiter(s) (600 mL/Hr) CRRT <Continuous>  propofol Infusion 10 MICROgram(s)/kG/Min (8.98 mL/Hr) IV Continuous <Continuous>  senna 2 Tablet(s) Oral at bedtime  vasopressin Infusion 0.06 Unit(s)/Min (9 mL/Hr) IV Continuous <Continuous>    MEDICATIONS  (PRN):  polyethylene glycol 3350 17 Gram(s) Oral two times a day PRN Constipation      ALLERGIES:  contrast media (iodine-based) (Fever; Vomiting; Flushing; Hypotension; Rash; Stomach Upset)  Ativan (Rash; Urticaria)  penicillins (Hives (Mod to Severe); Anaphylaxis (Mod to Severe); Short breath (Mod to Severe); Angioedema (Mod to Severe))    OBJECTIVE:  ICU Vital Signs Last 24 Hrs  T(C): 36.6 (24 Apr 2024 17:00), Max: 36.9 (23 Apr 2024 20:15)  T(F): 97.9 (24 Apr 2024 17:00), Max: 98.4 (23 Apr 2024 20:15)  HR: 66 (24 Apr 2024 18:53) (59 - 109)  BP: 110/59 (24 Apr 2024 16:44) (88/45 - 114/62)  BP(mean): 80 (24 Apr 2024 16:44) (78 - 80)  ABP: 108/39 (24 Apr 2024 18:53) (104/49 - 114/46)  ABP(mean): 63 (24 Apr 2024 18:53) (63 - 77)  RR: 4 (24 Apr 2024 18:53) (4 - 18)  SpO2: 99% (24 Apr 2024 18:53) (92% - 100%)    O2 Parameters below as of 24 Apr 2024 12:03  O2 Flow (L/min): 3    Mode: AC/ CMV (Assist Control/ Continuous Mandatory Ventilation)  RR (machine): 16  TV (machine): 600  FiO2: 100  PEEP: 8  ITime: 1  MAP: 13  PIP: 26    CAPILLARY BLOOD GLUCOSE  POCT Blood Glucose.: 220 mg/dL (24 Apr 2024 07:24)  POCT Blood Glucose.: 87 mg/dL (23 Apr 2024 23:59)  POCT Blood Glucose.: 92 mg/dL (23 Apr 2024 21:41)    I&O's Summary    23 Apr 2024 07:01  -  24 Apr 2024 07:00  --------------------------------------------------------  IN: 870 mL / OUT: 1320 mL / NET: -450 mL    24 Apr 2024 07:01  -  24 Apr 2024 19:41  --------------------------------------------------------  IN: 56 mL / OUT: 60 mL / NET: -4 mL      Daily Height in cm: 180.34 (24 Apr 2024 12:03)    Daily     LABS:  ABG - ( 24 Apr 2024 18:02 )  pH, Arterial: 7.22  pH, Blood: x     /  pCO2: 39    /  pO2: 336   / HCO3: 16    / Base Excess: -11.0 /  SaO2: 100.0                         10.2   14.11 )-----------( 126      ( 24 Apr 2024 18:21 )             33.7     04-24    133<L>  |  92<L>  |  52<H>  ----------------------------<  335<H>  6.1<H>   |  15<L>  |  3.89<H>    Ca    7.8<L>      24 Apr 2024 18:21  Phos  5.9     04-24  Mg     2.2     04-24    TPro  5.8<L>  /  Alb  2.9<L>  /  TBili  0.7  /  DBili  x   /  AST  17  /  ALT  10  /  AlkPhos  60  04-24    LIVER FUNCTIONS - ( 24 Apr 2024 18:21 )  Alb: 2.9 g/dL / Pro: 5.8 g/dL / ALK PHOS: 60 U/L / ALT: 10 U/L / AST: 17 U/L / GGT: x           PT/INR - ( 24 Apr 2024 18:21 )   PT: 15.8 sec;   INR: 1.52 ratio    PTT - ( 24 Apr 2024 18:21 )  PTT: 27.3 sec CHIEF COMPLAINT:  Patient is a 73y old female who presents with a chief complaint of syncope s/p TAVR c/b flash pulmonary edema requiring intubation.    INTERVAL HISTORY:  - s/p TVP for justo to the 30s with Mobitz I/II  - PPM today  - DKA on insulin gtt     REVIEW OF SYSTEMS:  [X] Unable to assess ROS because pt. intubated, sedated.    MEDICATIONS:  MEDICATIONS  (STANDING):  ascorbic acid 500 milliGRAM(s) Oral daily  aspirin  chewable 81 milliGRAM(s) Oral daily  atorvastatin 80 milliGRAM(s) Oral at bedtime  calamine/zinc oxide Lotion 1 Application(s) Topical three times a day  chlorhexidine 0.12% Liquid 15 milliLiter(s) Oral Mucosa every 12 hours  chlorhexidine 2% Cloths 1 Application(s) Topical daily  CRRT Treatment    <Continuous>  ferrous    sulfate 325 milliGRAM(s) Oral two times a day  insulin glargine Injectable (LANTUS) 8 Unit(s) SubCutaneous at bedtime  insulin lispro (ADMELOG) corrective regimen sliding scale   SubCutaneous every 6 hours  levothyroxine 75 MICROGram(s) Oral daily  multivitamin 1 Tablet(s) Oral daily  nystatin    Suspension 731410 Unit(s) Oral four times a day  petrolatum white Ointment 1 Application(s) Topical three times a day  PrismaSATE Dialysate BGK 4 / 2.5 5000 milliLiter(s) (1400 mL/Hr) CRRT <Continuous>  PrismaSOL Filtration BGK 0 / 2.5 5000 milliLiter(s) (800 mL/Hr) CRRT <Continuous>  PrismaSOL Filtration BGK 4 / 2.5 5000 milliLiter(s) (600 mL/Hr) CRRT <Continuous>  propofol Infusion 10 MICROgram(s)/kG/Min (8.98 mL/Hr) IV Continuous <Continuous>  senna 2 Tablet(s) Oral at bedtime  vasopressin Infusion 0.06 Unit(s)/Min (9 mL/Hr) IV Continuous <Continuous>    MEDICATIONS  (PRN):  polyethylene glycol 3350 17 Gram(s) Oral two times a day PRN Constipation      ALLERGIES:  contrast media (iodine-based) (Fever; Vomiting; Flushing; Hypotension; Rash; Stomach Upset)  Ativan (Rash; Urticaria)  penicillins (Hives (Mod to Severe); Anaphylaxis (Mod to Severe); Short breath (Mod to Severe); Angioedema (Mod to Severe))    OBJECTIVE:  ICU Vital Signs Last 24 Hrs  T(C): 36.6 (24 Apr 2024 17:00), Max: 36.9 (23 Apr 2024 20:15)  T(F): 97.9 (24 Apr 2024 17:00), Max: 98.4 (23 Apr 2024 20:15)  HR: 66 (24 Apr 2024 18:53) (59 - 109)  BP: 110/59 (24 Apr 2024 16:44) (88/45 - 114/62)  BP(mean): 80 (24 Apr 2024 16:44) (78 - 80)  ABP: 108/39 (24 Apr 2024 18:53) (104/49 - 114/46)  ABP(mean): 63 (24 Apr 2024 18:53) (63 - 77)  RR: 4 (24 Apr 2024 18:53) (4 - 18)  SpO2: 99% (24 Apr 2024 18:53) (92% - 100%)    O2 Parameters below as of 24 Apr 2024 12:03  O2 Flow (L/min): 3    Mode: AC/ CMV (Assist Control/ Continuous Mandatory Ventilation)  RR (machine): 16  TV (machine): 600  FiO2: 100  PEEP: 8  ITime: 1  MAP: 13  PIP: 26    CAPILLARY BLOOD GLUCOSE  POCT Blood Glucose.: 220 mg/dL (24 Apr 2024 07:24)  POCT Blood Glucose.: 87 mg/dL (23 Apr 2024 23:59)  POCT Blood Glucose.: 92 mg/dL (23 Apr 2024 21:41)    I&O's Summary    23 Apr 2024 07:01  -  24 Apr 2024 07:00  --------------------------------------------------------  IN: 870 mL / OUT: 1320 mL / NET: -450 mL    24 Apr 2024 07:01  -  24 Apr 2024 19:41  --------------------------------------------------------  IN: 56 mL / OUT: 60 mL / NET: -4 mL      Daily Height in cm: 180.34 (24 Apr 2024 12:03)    Daily       PHYSICAL EXAM  GENERAL: No acute distress  NEURO: Intubated and sedated  HEENT: Pupil equal, round, and reactive to light, moist mucus membrane  RESP: Clear to auscultation bilateral  CVS: S1/S2 present, normal rate and rhythm  ABD: Soft, nondistended   EXT: B/l LE lymphedema   SKIN: Psoriatic lesions on the b/l upper and lower extremities       LABS:  ABG - ( 24 Apr 2024 18:02 )  pH, Arterial: 7.22  pH, Blood: x     /  pCO2: 39    /  pO2: 336   / HCO3: 16    / Base Excess: -11.0 /  SaO2: 100.0                         10.2   14.11 )-----------( 126      ( 24 Apr 2024 18:21 )             33.7     04-24    133<L>  |  92<L>  |  52<H>  ----------------------------<  335<H>  6.1<H>   |  15<L>  |  3.89<H>    Ca    7.8<L>      24 Apr 2024 18:21  Phos  5.9     04-24  Mg     2.2     04-24    TPro  5.8<L>  /  Alb  2.9<L>  /  TBili  0.7  /  DBili  x   /  AST  17  /  ALT  10  /  AlkPhos  60  04-24    LIVER FUNCTIONS - ( 24 Apr 2024 18:21 )  Alb: 2.9 g/dL / Pro: 5.8 g/dL / ALK PHOS: 60 U/L / ALT: 10 U/L / AST: 17 U/L / GGT: x           PT/INR - ( 24 Apr 2024 18:21 )   PT: 15.8 sec;   INR: 1.52 ratio    PTT - ( 24 Apr 2024 18:21 )  PTT: 27.3 sec

## 2024-04-25 NOTE — PROGRESS NOTE ADULT - NUTRITIONAL ASSESSMENT
This patient has been assessed with a concern for Malnutrition and has been determined to have a diagnosis/diagnoses of Morbid obesity (BMI > 40).    This patient is being managed with:   Diet NPO-     Special Instructions for Nursing:  Except medications  Entered: Apr 25 2024  5:28AM

## 2024-04-25 NOTE — PROGRESS NOTE ADULT - ASSESSMENT
73F w HFrEF (EF 30%), mod AS, known LBBB, HTN, T1DM, CKD, hypothyroidism, chronic lymphedema, suspected OHS/LELIA on 3L NC home O2 p/w 1 episode of syncope with R arm jerking, likely 2/2 severe symptomatic AS. CTA imagings performed for TAVR eval, c/b DUNCAN on CKD. C/b febrile and hypotension, sepsis workup pending. CT c/a/p w/w/o IV contrast revealed incidental finding of L adrenal nodule.    #L adrenal nodule   Incidentally found on CT c/a/p w/wo IV contrast done for TAVR evaluation. The left adrenal gland is thickened and a 1.2 x 0.8 cm left adrenal nodule is seen. The right adrenal gland is normal.  Primary team discussed with radiology. HU of the adrenal nodule not able to be accurately determined due to motion artifact, also given imaging was taken at venous phase.     Hx HTN, T1DM, obesity difficulty losing weight.    Test for excess adrenal hormones:  - Dex suppression test: AM cortisol 9.4 not suppressed with sufficiently high dexamethasone 374 (4/18/24). Repeat test AM cortisol 4.1 not suppressed with ACTH 9.5, dex level pending. Concerning for possible Cushings, likely primary adrenal source given ACTH not elevated. Follow up 24 hour urine cortisol and creatinine as confirmatory testing to r/o Cushings'. Salivary cortisol cancelled for QNS.   - plasma metanephrines 46.8 normal range   - serum aldosterone is elevated to 37.4. Follow up plasma renin activity.  - DHEAS 139 wnl. Follow up androstenedione     Recommendations:  - Follow up remaining cushings' workup: 24h urine cortisol.  - Nonurgent CT non-contrast adrenal protocol to determine hounsfield units of adrenal nodule. (Of note, CT features suggesting malignancy include HU >10, mass size >4cm, and >50% contrast retention seen 10 minutes after contrast administration (contrast retention is of low specificity/sensitivity).)  - Will need follow up outpatient with Dr. Luis Dumont    #T1DM  #DKA  Has hx of T1DM since age 26  Endocrinologist: Dr. Luis Dumont  Home regimen: Lantus 33 units qhs, Novolog based on sliding scale TIDAC normally 3-5 units  Has Dexcom G7  A1c is 7.4%  C peptide undetectable <0.1 with glucose 107, confirming insulin deficiency    Hyperglycemic yesterday/overnight with BHB elevation 5.6 with HAGMA, concerning for DKA. HAGMA have been also contributed by lactic acidosis and renal failure.  On insulin gtt 3u/hr.  - Continue insulin gtt, titrate hourly per ICU DKA protocols  - FS q1h   - Check BMP, BHB, VBG q4h  - will be ready to transition off of the insulin drip when glucose <200 AND bicarb >18, AG <14, and pH>7.3  - Once ready to transition off the insulin drip, would give 25 units of lantus subcutaneously, then turn off insulin drip and dextrose fluids two hours after to overlap. At this time, can start PO diet and start admelog 4 units TIDAC and Low dose admelog correction scale TIDAC, Low dose admelog correction scale QHS  - Of note, patient instructed on discharge from recent hospitalization at [place] to start farxiga for CHF. Given risks of DKA of SGLT2i in T1DM, would not start until better data is available for its benefits.  - Discharge regimen: home basal/bolus insulin, dose TBD.  - Follow up with Dr. Luis Dumont outpatient    #Hx of Hypothyroidism  TSH slightly elevated to 4.79-5.06 with normal FT4 1.3-1.5  - continue home LT4 75mcg daily  - repeat TFT's outpatient when clinically stabilized    Calvin Astudillo MD  Endocrine Fellow  Can be reached via teams. For follow up questions, discharge recommendations, or new consults, please call answering service at [phone] (weekdays); [phone] (nights/weekends). 73F w HFrEF (EF 30%), mod AS, known LBBB, HTN, T1DM, CKD, hypothyroidism, chronic lymphedema, suspected OHS/LELIA on 3L NC home O2 p/w 1 episode of syncope with R arm jerking, likely 2/2 severe symptomatic AS. CTA imagings performed for TAVR eval, c/b DUNCAN on CKD. C/b febrile and hypotension, sepsis workup pending. CT c/a/p w/w/o IV contrast revealed incidental finding of L adrenal nodule.    #L adrenal nodule   Incidentally found on CT c/a/p w/wo IV contrast done for TAVR evaluation. The left adrenal gland is thickened and a 1.2 x 0.8 cm left adrenal nodule is seen. The right adrenal gland is normal.  Primary team discussed with radiology. HU of the adrenal nodule not able to be accurately determined due to motion artifact, also given imaging was taken at venous phase.     Hx HTN, T1DM, obesity difficulty losing weight.    Test for excess adrenal hormones:  - Dex suppression test: AM cortisol 9.4 not suppressed with sufficiently high dexamethasone 374 (4/18/24). Repeat test AM cortisol 4.1 not suppressed with ACTH 9.5, dex level pending. Concerning for possible Cushings, likely primary adrenal source given ACTH not elevated. Follow up 24 hour urine cortisol and creatinine as confirmatory testing to r/o Cushings'. Salivary cortisol cancelled for QNS.   - plasma metanephrines 46.8 normal range   - serum aldosterone is elevated to 37.4. Follow up plasma renin activity.  - DHEAS 139 wnl. Follow up androstenedione     Recommendations:  - Follow up remaining cushings' workup: 2nd dex suppression test dexamethasone level, 24h urine cortisol.  - Nonurgent CT non-contrast adrenal protocol to determine hounsfield units of adrenal nodule. (Of note, CT features suggesting malignancy include HU >10, mass size >4cm, and >50% contrast retention seen 10 minutes after contrast administration (contrast retention is of low specificity/sensitivity).)  - Will need follow up outpatient with Dr. Luis Dumont    #T1DM  #DKA  Has hx of T1DM since age 26  Endocrinologist: Dr. Luis Dumont  Home regimen: Lantus 33 units qhs, Novolog based on sliding scale TIDAC normally 3-5 units  Has Dexcom G7  A1c is 7.4%  C peptide undetectable <0.1 with glucose 107, confirming insulin deficiency    Hyperglycemic yesterday/overnight with BHB elevation 5.6 with HAGMA, concerning for DKA. HAGMA have been also contributed by lactic acidosis and renal failure.  On insulin gtt 3u/hr.  - Continue insulin gtt, titrate hourly per ICU DKA protocols  - FS q1h   - Check BMP, BHB, VBG q4h  - will be ready to transition off of the insulin drip when glucose <200 AND bicarb >18, AG <14, and pH>7.3  - Once ready to transition off the insulin drip, would give 24 units of lantus subcutaneously, then turn off insulin drip and dextrose fluids two hours after to overlap. At this time, can start PO diet and start admelog 4 units TIDAC and Low dose admelog correction scale TIDAC, Low dose admelog correction scale QHS  - Of note, patient instructed on discharge from recent hospitalization at [place] to start farxiga for CHF. Given risks of DKA of SGLT2i in T1DM, would not start until better data is available for its benefits.  - Discharge regimen: home basal/bolus insulin, dose TBD.  - Follow up with Dr. Luis Dumont outpatient    #Hx of Hypothyroidism  TSH slightly elevated to 4.79-5.06 with normal FT4 1.3-1.5  - continue home LT4 75mcg daily  - repeat TFT's outpatient when clinically stabilized    Calvin Astudillo MD  Endocrine Fellow  Can be reached via teams. For follow up questions, discharge recommendations, or new consults, please call answering service at [phone] (weekdays); [phone] (nights/weekends). 73F w HFrEF (EF 30%), mod AS, known LBBB, HTN, T1DM, CKD, hypothyroidism, chronic lymphedema, suspected OHS/LELIA on 3L NC home O2 p/w 1 episode of syncope with R arm jerking, likely 2/2 severe symptomatic AS. CTA imagings performed for TAVR eval, c/b DUNCAN on CKD. C/b febrile and hypotension, sepsis workup pending. CT c/a/p w/w/o IV contrast revealed incidental finding of L adrenal nodule.    #L adrenal nodule   Incidentally found on CT c/a/p w/wo IV contrast done for TAVR evaluation. The left adrenal gland is thickened and a 1.2 x 0.8 cm left adrenal nodule is seen. The right adrenal gland is normal.  Primary team discussed with radiology. HU of the adrenal nodule not able to be accurately determined due to motion artifact, also given imaging was taken at venous phase.     Hx HTN, T1DM, obesity difficulty losing weight.    Test for excess adrenal hormones:  - Dex suppression test: AM cortisol 9.4 not suppressed with sufficiently high dexamethasone 374 (4/18/24). Repeat test AM cortisol 4.1 not suppressed with ACTH 9.5, dex level pending. Concerning for possible Cushings, likely primary adrenal source given ACTH not elevated. Follow up 24 hour urine cortisol and creatinine as confirmatory testing to r/o Cushings'. Salivary cortisol cancelled for QNS.   - plasma metanephrines 46.8 normal range   - serum aldosterone is elevated to 37.4. Follow up plasma renin activity.  - DHEAS 139 wnl. Follow up androstenedione     Recommendations:  - Follow up remaining cushings' workup: 2nd dex suppression test dexamethasone level, 24h urine cortisol.  - Nonurgent CT non-contrast adrenal protocol to determine hounsfield units of adrenal nodule. (Of note, CT features suggesting malignancy include HU >10, mass size >4cm, and >50% contrast retention seen 10 minutes after contrast administration (contrast retention is of low specificity/sensitivity).)  - Will need follow up outpatient with Dr. Luis Dumont    #T1DM  #DKA  Has hx of T1DM since age 26  Endocrinologist: Dr. Luis Dumont  Home regimen: Lantus 33 units qhs, Novolog based on sliding scale TIDAC normally 3-5 units  Has Dexcom G7  A1c is 7.4%  C peptide undetectable <0.1 with glucose 107, confirming insulin deficiency    Hyperglycemic yesterday/overnight with BHB elevation 5.6 with HAGMA, concerning for DKA. HAGMA have been also contributed by lactic acidosis and renal failure.  On insulin gtt 3u/hr.  - Continue insulin gtt, titrate hourly per ICU DKA protocols  - FS q1h   - Check BMP, BHB, VBG q4h  - Can continue insulin gtt while remains extubated, on pressors. Will be ready to transition off of the insulin drip when glucose <200 AND bicarb >18, AG <14, and pH>7.3 and extubated, ready to eat.  - Once ready to transition off the insulin drip, would give 24 units of lantus subcutaneously, then turn off insulin drip and dextrose fluids two hours after to overlap. At this time, can start PO diet and start admelog 4 units TIDAC and Low dose admelog correction scale TIDAC, Low dose admelog correction scale QHS  - Of note, patient instructed on discharge from recent hospitalization at [place] to start farxiga for CHF. Given risks of DKA of SGLT2i in T1DM, would not start until better data is available for its benefits.  - Discharge regimen: home basal/bolus insulin, dose TBD.  - Follow up with Dr. Luis Dumont outpatient    #Hx of Hypothyroidism  TSH slightly elevated to 4.79-5.06 with normal FT4 1.3-1.5  - continue home LT4 75mcg daily  - repeat TFT's outpatient when clinically stabilized    Calvin Astudillo MD  Endocrine Fellow  Can be reached via teams. For follow up questions, discharge recommendations, or new consults, please call answering service at [phone] (weekdays); [phone] (nights/weekends).

## 2024-04-25 NOTE — PROGRESS NOTE ADULT - ASSESSMENT
Assessment:  74 y/o female w/ PMH: HFrEF (EF 30%), AS, LBBB, HTN, IDDM1, CKD, hypothyroidism, chronic lymphedema, suspected OHS/LELIA (3L NC home O2) p/w 1 episode of syncope most likely 2/2 moderate to severe AS, admitted to floors for TAVR w/u. Course complicated by suspected contrast induced allergic reaction and contrast related renal failure requiring HD. Pt. s/p TAVR on 2/24 which was c/b VT and vfib requiring shock and flash pulmonary edema causing AHRF. Extubated to HFNC. Currently undergoing CRRT, requiring minimal pressor support for possible vasoplegic shock, all of which require management in CICU. Course c/b heart block requiring TVP yesterday, s/p micra today.    Plan:  NEURO:  - Extubated 4/25  - A&Ox3  - Monitor mental status per protocol    PULM  # Acute hypoxic respiratory failure most likely 2/2 flash pulmonary edema  # OHS/LELIA  - Extubated to HFNF 50/50, ABG stable  - Trend ABGs, wean vent as able    CV  # VT/vfib s/p shock  # Known LBBB  - Monitor on tele  - Trend lytes    # 1st degree AVB w/ PACs > progressing to Mobitz I/II  # Severe AS s/p TAVR  - TVP placed emergently at bedside this AM, now s/p micra  - TTE in AM  - c/w ASA  - c/w atorvastatin     # Vasolplegia most likely 2/2 sedation   - c/w vaso, titrate to MAP >=65  - Trend lactate    # HFrEF  - 4/25 TTEL EF 25%  - Holding GDMT while on vaso    /RENAL  # DUNCAN on CKD requring HD c/b hyperkalemia  # Contrast induced ATN  - s/p HD 4/25 w/ 2L fluid removal, hyperkalemia improved  - c/w CRRT  - Strict I/Os  - Trend BMP, lytes  - Avoid nephrotoxins    GI  - NPO  - Bedside swallow eval in AM    ENDO  # IDDM1 c/b DKA  - Bicarb normalized, GAP cleared  - Will attempt to transition off insulin gtt tonight  - Trend BMP and pH q6hrs  - Started on D51/2NS per DKA protocol    # Hypothyroidism  - c/w home levothyroxine     HEME  # VTE prophylaxis  - SQH on hold since micra placed  - SCDs for now  - Trend CBC daily    ID  # Worsening leukocytosis  - Afebrile  - Blood cultures x2 sent today  - Monitor WBC and for fevers    # Oral Candidiasis  - c/w nystatin     SKIN  # Acute generalized exanthematous pustulosis due to drug  - c/w calamine  - c/w vaseline  - Seen by derm    # Lymphedema   - Elevate legs  - Compression stockings, ACE wrapping    Patient requires continuous monitoring with bedside rhythm monitoring, pulse ox monitoring, and intermittent blood gas analysis. Care plan discussed with ICU care team. Patient remained critical and at risk for life threatening decompensation.  Patient seen, examined and plan discussed with CCU team during rounds.     I have personally provided 35 minutes of critical care time excluding time spent on separate procedures, in addition to initial critical care time provided by the CICU Attending, Dr. Quintero.    Becka Martinez, [facility]-BC

## 2024-04-25 NOTE — PROGRESS NOTE ADULT - SUBJECTIVE AND OBJECTIVE BOX
Admitted for: Syncope and collapse        Following for: L adrenal nodule    Subjective:   Patient transferred to CICU yesterday after TAVR placement was complicated by VT and vfib requiring defibrillation, flash pulmonary edema causing AHRF requiring intubation.    Patient remains intubated.      MEDICATIONS  (STANDING):  ascorbic acid 500 milliGRAM(s) Oral daily  aspirin  chewable 81 milliGRAM(s) Oral daily  atorvastatin 80 milliGRAM(s) Oral at bedtime  calamine/zinc oxide Lotion 1 Application(s) Topical three times a day  chlorhexidine 0.12% Liquid 15 milliLiter(s) Oral Mucosa every 12 hours  chlorhexidine 2% Cloths 1 Application(s) Topical daily  CRRT Treatment    <Continuous>  ferrous    sulfate 325 milliGRAM(s) Oral two times a day  insulin regular Infusion 3 Unit(s)/Hr (3 mL/Hr) IV Continuous <Continuous>  levothyroxine 75 MICROGram(s) Oral daily  multivitamin 1 Tablet(s) Oral daily  nystatin    Suspension 003922 Unit(s) Oral four times a day  petrolatum white Ointment 1 Application(s) Topical three times a day  PrismaSATE Dialysate BGK 4 / 2.5 5000 milliLiter(s) (1400 mL/Hr) CRRT <Continuous>  PrismaSOL Filtration BGK 0 / 2.5 5000 milliLiter(s) (800 mL/Hr) CRRT <Continuous>  PrismaSOL Filtration BGK 4 / 2.5 5000 milliLiter(s) (600 mL/Hr) CRRT <Continuous>  propofol Infusion 10 MICROgram(s)/kG/Min (8.98 mL/Hr) IV Continuous <Continuous>  senna 2 Tablet(s) Oral at bedtime  vasopressin Infusion 0.06 Unit(s)/Min (9 mL/Hr) IV Continuous <Continuous>    MEDICATIONS  (PRN):  polyethylene glycol 3350 17 Gram(s) Oral two times a day PRN Constipation      Allergies    contrast media (iodine-based) (Fever; Vomiting; Flushing; Hypotension; Rash; Stomach Upset)  Ativan (Rash; Urticaria)  penicillins (Hives (Mod to Severe); Anaphylaxis (Mod to Severe); Short breath (Mod to Severe); Angioedema (Mod to Severe))    Intolerances          PHYSICAL EXAM:  VITALS: T(C): 34.4 (04-25-24 @ 12:00)  T(F): 93.9 (04-25-24 @ 12:00), Max: 98 (04-24-24 @ 19:15)  HR: 53 (04-25-24 @ 13:45) (32 - 85)  BP: 138/63 (04-25-24 @ 10:19) (98/50 - 138/63)  RR:  (3 - 35)  SpO2:  (89% - 100%)  Wt(kg): --  GENERAL: intubated, sedated  EYES: No proptosis  RESPIRATORY: intubated  CARDIOVASCULAR: bradycardic  GI: non distended  EXT: BLE bandaged  PSYCH: sedated      A1C with Estimated Average Glucose Result: 7.4 % (03-30-24 @ 08:21)  A1C with Estimated Average Glucose Result: 6.8 % (01-10-24 @ 08:37)  A1C with Estimated Average Glucose Result: 7.3 % (08-12-23 @ 07:58)  A1C with Estimated Average Glucose Result: 8.2 % (06-05-23 @ 06:10)      POCT Blood Glucose.: 154 mg/dL (04-25-24 @ 14:06)  POCT Blood Glucose.: 189 mg/dL (04-25-24 @ 12:46)  POCT Blood Glucose.: 188 mg/dL (04-25-24 @ 11:20)  POCT Blood Glucose.: 208 mg/dL (04-25-24 @ 10:06)  POCT Blood Glucose.: 220 mg/dL (04-25-24 @ 09:02)  POCT Blood Glucose.: 237 mg/dL (04-25-24 @ 07:51)  POCT Blood Glucose.: 271 mg/dL (04-25-24 @ 05:23)  POCT Blood Glucose.: 237 mg/dL (04-24-24 @ 22:30)  POCT Blood Glucose.: 220 mg/dL (04-24-24 @ 07:24)  POCT Blood Glucose.: 87 mg/dL (04-23-24 @ 23:59)  POCT Blood Glucose.: 92 mg/dL (04-23-24 @ 21:41)  POCT Blood Glucose.: 85 mg/dL (04-23-24 @ 18:05)  POCT Blood Glucose.: 109 mg/dL (04-23-24 @ 12:24)  POCT Blood Glucose.: 119 mg/dL (04-23-24 @ 08:14)  POCT Blood Glucose.: 132 mg/dL (04-22-24 @ 21:47)  POCT Blood Glucose.: 130 mg/dL (04-22-24 @ 18:07)      04-25    133<L>  |  96  |  36<H>  ----------------------------<  237<H>  3.7   |  18<L>  |  2.47<H>    eGFR: 20<L>    Ca    8.1<L>      04-25  Mg     2.1     04-25  Phos  2.8     04-25    TPro  5.9<L>  /  Alb  3.0<L>  /  TBili  0.7  /  DBili  x   /  AST  19  /  ALT  7<L>  /  AlkPhos  56  04-25      Thyroid Function Tests:  04-14 @ 07:18 TSH 5.06 FreeT4 1.3 T3 -- Anti TPO -- Anti Thyroglobulin Ab -- TSI --  04-13 @ 07:05 TSH 4.79 FreeT4 1.5 T3 -- Anti TPO -- Anti Thyroglobulin Ab -- TSI --

## 2024-04-25 NOTE — PROGRESS NOTE ADULT - ATTENDING COMMENTS
73 F w/ bicuspid severe AS s/p TAVR w/ course c/b bradycardia.    Neuro: sedated on propofol, plan for SAT after procedures today  Resp: Hypoxia significantly improved with fluid removal, SBT, hopeful extubation today.  CVS: Vasoplegia requiring vaso, wean with sedation wean. Plan for micrappm with EP today. TVP in place f/u TTE.   Renal: c/w CVVH for aggressive volume removal.   GI: NPO for now, possible extubate today. IV Protonix for ppx if unable to extubate  ID: Leukocytosis, possibly reactive, improving. Monitor off abx for now. Send cultures   Endo: Labs concerning for DKA, started on insulin gtt, q4h labs until AG closes   Heme: Hold HSQ for procedure  OMAR TVP 4/25

## 2024-04-25 NOTE — PROGRESS NOTE ADULT - SUBJECTIVE AND OBJECTIVE BOX
[facility]   [phone]  RENAL FOLLOW UP NOTE  --------------------------------------------------------------------------------  Chief Complaint:    24 hour events/subjective:    seen earlier   intubated   on vasopressin   on CRRT     PAST HISTORY  --------------------------------------------------------------------------------  No significant changes to PMH, PSH, FHx, SHx, unless otherwise noted    ALLERGIES & MEDICATIONS  --------------------------------------------------------------------------------  Allergies    contrast media (iodine-based) (Fever; Vomiting; Flushing; Hypotension; Rash; Stomach Upset)  Ativan (Rash; Urticaria)  penicillins (Hives (Mod to Severe); Anaphylaxis (Mod to Severe); Short breath (Mod to Severe); Angioedema (Mod to Severe))    Intolerances      Standing Inpatient Medications  ascorbic acid 500 milliGRAM(s) Oral daily  aspirin  chewable 81 milliGRAM(s) Oral daily  atorvastatin 80 milliGRAM(s) Oral at bedtime  calamine/zinc oxide Lotion 1 Application(s) Topical three times a day  chlorhexidine 2% Cloths 1 Application(s) Topical daily  CRRT Treatment    <Continuous>  dextrose 5% + sodium chloride 0.45%. 1000 milliLiter(s) IV Continuous <Continuous>  ferrous    sulfate 325 milliGRAM(s) Oral two times a day  insulin regular Infusion 3 Unit(s)/Hr IV Continuous <Continuous>  levothyroxine 75 MICROGram(s) Oral daily  multivitamin 1 Tablet(s) Oral daily  nystatin    Suspension 450315 Unit(s) Oral four times a day  petrolatum white Ointment 1 Application(s) Topical three times a day  PrismaSATE Dialysate BGK 4 / 2.5 5000 milliLiter(s) CRRT <Continuous>  PrismaSOL Filtration BGK 0 / 2.5 5000 milliLiter(s) CRRT <Continuous>  PrismaSOL Filtration BGK 4 / 2.5 5000 milliLiter(s) CRRT <Continuous>  senna 2 Tablet(s) Oral at bedtime  vasopressin Infusion 0.06 Unit(s)/Min IV Continuous <Continuous>    PRN Inpatient Medications  polyethylene glycol 3350 17 Gram(s) Oral two times a day PRN      REVIEW OF SYSTEMS  --------------------------------------------------------------------------------        VITALS/PHYSICAL EXAM  --------------------------------------------------------------------------------  T(C): 36.8 (04-25-24 @ 19:00), Max: 36.8 (04-25-24 @ 19:00)  HR: 86 (04-25-24 @ 21:15) (32 - 86)  BP: 138/63 (04-25-24 @ 10:19) (98/50 - 138/63)  RR: 12 (04-25-24 @ 21:15) (3 - 35)  SpO2: 100% (04-25-24 @ 21:15) (89% - 100%)  Wt(kg): --  Height (cm): 180.3 (04-25-24 @ 10:19)  Weight (kg): 149.7 (04-25-24 @ 10:19)  BMI (kg/m2): 46.1 (04-25-24 @ 10:19)  BSA (m2): 2.61 (04-25-24 @ 10:19)      04-24-24 @ 07:01  -  04-25-24 @ 07:00  --------------------------------------------------------  IN: 1867.9 mL / OUT: 3394 mL / NET: -1526.1 mL    04-25-24 @ 07:01  -  04-25-24 @ 21:28  --------------------------------------------------------  IN: 612.4 mL / OUT: 1450 mL / NET: -837.6 mL      Physical Exam:  	    Gen:  on RA, facial erythema, neck and arm erythema,  intubated   	Pulm: decrease bs  no rales or ronchi or wheezing  	CV: No JVD. RRR, S1S2; no rub II/VI M   	Abd: +BS, soft, nontender/nondistended, obese   	UE: Warm, no cyanosis  no clubbing,   +  edema  	LE: Warm, no cyanosis  no clubbing, 4+ edema, B/L LE raised red plaque   	Neuro: intubated                Vascular Access: [facility] non tunneled HD catheter    LABS/STUDIES  --------------------------------------------------------------------------------              9.9    17.43 >-----------<  120      [04-25-24 @ 08:40]              31.2     135  |  100  |  31  ----------------------------<  172      [04-25-24 @ 20:57]  3.9   |  24  |  2.23        Ca     8.0     [04-25-24 @ 20:57]      Mg     2.2     [04-25-24 @ 20:57]      Phos  3.2     [04-25-24 @ 20:57]    TPro  5.8  /  Alb  2.8  /  TBili  0.6  /  DBili  x   /  AST  17  /  ALT  8   /  AlkPhos  54  [04-25-24 @ 20:57]    PT/INR: PT 15.6 , INR 1.43       [04-25-24 @ 08:40]  PTT: 25.6       [04-25-24 @ 08:40]      Creatinine Trend:  SCr 2.23 [04-25 @ 20:57]  SCr 2.46 [04-25 @ 15:22]  SCr 2.47 [04-25 @ 08:40]  SCr 2.96 [04-25 @ 01:19]  SCr 3.89 [04-24 @ 18:21]      PTH -- (Ca 8.4)      [04-19-24 @ 17:54]   109  TSH 5.06      [04-14-24 @ 07:18]  Lipid: chol 74, TG 70, HDL 33, LDL --      [04-13-24 @ 07:05]

## 2024-04-25 NOTE — PROGRESS NOTE ADULT - ASSESSMENT
73F w HFrEF (EF 30%), mod AS, known LBBB, HTN, IDDM1, CKD, hypothyroidism, chronic lymphedema, p/w 1 episode of syncope with R arm jerking.     #Syncope-Aortic Stenosis  - Known Aortic Stenosis likely Severe in setting of decreased LVEF  - s/p tavr on 4/24 c/b VT -> shock -> VFib -> shock  - hypoxic/congested, and now remains intubated.  - overnight with worsening bradycardia, and now s/p tvp placement  - cont pressors as needed  - cvvhd to remove 100 cc/hr    #Chronic Systolic HF (EF 30%), mod to severe AS, HTN, LBBB, Lymphedema   - Has known systolic HF and AS.    - Repeat TTE showed EF 30%, mild-mod LVH, mildly reduced RVF, mod-severe AS (.61 cmsq), mod pHTN  - On home Torsemide 20 mg daily, Eplerenone 25 mg daily, and Toprol  mg daily  - Compliant with all meds including Torsemide, though, dose was reduced to Furosemide 40mg once daily.   - proBNP 51K from 80k.   - Was on bumex gtt but then overnight 4/15 developed a fever to 102F and became hypotensive overnight.  - then started on HD    #LBBB  Known LBBB  Noted intermittent Wenckebach on telemetry with bradycardia and 2:1 AV conduction  - EP has been consulted.   -s/p tvp placement this am    - very high risk of decompensation

## 2024-04-25 NOTE — PROGRESS NOTE ADULT - ASSESSMENT
73F w HFrEF (EF 30%), mod AS, known LBBB, HTN, IDDM1, CKD, hypothyroidism, chronic lymphedema, suspected OHS/LELIA on 3L NC home O2 p/w 1 episode of syncope. Recent admission at [facility] (3/29-4/5) for ADHF and DUNCAN s/p diuresis, discharged with new home O2 3L NC suspecting OHS/LELIA pending outpatient w/u. Since discharge a week ago, she has been staying at home with   Pt had sob walking to car. Once in car, she was still breathing heavily. Partner noticed pt was not responding to verbal stimuli for 10-15sec and witnessed R arm jerking. Pt gained consciousness quickly without postictal symptoms. Pt went to Cardiologist Dr. Nathan who recommended pt to come to ED. No fever, cp, abd pain, n/v. Leg swelling is improved from prior. Pt on torsemide 40mg which she has been taking. Pt was discharged on 3LNC which she is currently on. Patient reports she did not take Farxiga that she was discharged on as she was concerned about side effects.     In ED, patient was found afebrile, hemodynamically stable, breathing well on 3L NC. Initial labs notable for mild hyponatremia, DUNCAN on CKD, elevated proBNP and elevated pCO2 both improved from prior.  pt also noticed with abn creatinine      1- CKD IV  with DUNCAN   2- CHF   3- lymphedema   4- severe AS  s/p tavr 4/24  5- syncope   6- hyperkalemia  7- hypotension         suspect ATN from hypotension along with contrast nephropathy   creatinine worsening requiring renal replacement therapy, HD initiated 4/18  cont CRRT bfr 160 dfr 1400 fluid removal 175 cc/hr   see new orders   will need to have temp hd cath changed to tunnel cath after TAVR   EP consulted for syncope device placement for bradycardia   structural heart team following, s/p TAVR  derm following for rash  pressor support   vent support   dYoanw CCU team

## 2024-04-25 NOTE — PROGRESS NOTE ADULT - SUBJECTIVE AND OBJECTIVE BOX
[facility] - Howard Kimble, Carlos Luong, Herman Alston  Office Number:  [phone]    intubated/sedated  cvvhd 100cc/hr being removed  on vaso, off levo  tvp placed overnight      ROS: negative unless otherwise mentioned.    Telemetry:      MEDICATIONS  (STANDING):  ascorbic acid 500 milliGRAM(s) Oral daily  aspirin  chewable 81 milliGRAM(s) Oral daily  atorvastatin 80 milliGRAM(s) Oral at bedtime  calamine/zinc oxide Lotion 1 Application(s) Topical three times a day  chlorhexidine 0.12% Liquid 15 milliLiter(s) Oral Mucosa every 12 hours  chlorhexidine 2% Cloths 1 Application(s) Topical daily  CRRT Treatment    <Continuous>  ferrous    sulfate 325 milliGRAM(s) Oral two times a day  heparin   Injectable 5000 Unit(s) SubCutaneous every 8 hours  insulin regular  human recombinant. 3 Unit(s) IV Push once  insulin regular Infusion 3 Unit(s)/Hr (3 mL/Hr) IV Continuous <Continuous>  levothyroxine 75 MICROGram(s) Oral daily  multivitamin 1 Tablet(s) Oral daily  norepinephrine Infusion 0.05 MICROgram(s)/kG/Min (14 mL/Hr) IV Continuous <Continuous>  nystatin    Suspension 129322 Unit(s) Oral four times a day  petrolatum white Ointment 1 Application(s) Topical three times a day  PrismaSATE Dialysate BGK 4 / 2.5 5000 milliLiter(s) (1400 mL/Hr) CRRT <Continuous>  PrismaSOL Filtration BGK 0 / 2.5 5000 milliLiter(s) (800 mL/Hr) CRRT <Continuous>  PrismaSOL Filtration BGK 4 / 2.5 5000 milliLiter(s) (600 mL/Hr) CRRT <Continuous>  propofol Infusion 10 MICROgram(s)/kG/Min (8.98 mL/Hr) IV Continuous <Continuous>  senna 2 Tablet(s) Oral at bedtime  vasopressin Infusion 0.06 Unit(s)/Min (9 mL/Hr) IV Continuous <Continuous>    MEDICATIONS  (PRN):  polyethylene glycol 3350 17 Gram(s) Oral two times a day PRN Constipation      Allergies    contrast media (iodine-based) (Fever; Vomiting; Flushing; Hypotension; Rash; Stomach Upset)  Ativan (Rash; Urticaria)  penicillins (Hives (Mod to Severe); Anaphylaxis (Mod to Severe); Short breath (Mod to Severe); Angioedema (Mod to Severe))    Intolerances        Vital Signs Last 24 Hrs  T(C): 36.4 (24 Apr 2024 22:44), Max: 36.7 (24 Apr 2024 11:16)  T(F): 97.6 (24 Apr 2024 22:44), Max: 98.1 (24 Apr 2024 11:16)  HR: 42 (25 Apr 2024 07:00) (32 - 109)  BP: 125/60 (25 Apr 2024 07:00) (93/53 - 125/60)  BP(mean): 86 (25 Apr 2024 07:00) (69 - 86)  RR: 17 (25 Apr 2024 07:00) (4 - 35)  SpO2: 100% (25 Apr 2024 07:00) (89% - 100%)    Parameters below as of 25 Apr 2024 04:00  Patient On (Oxygen Delivery Method): ventilator        I&O's Summary    24 Apr 2024 07:01  -  25 Apr 2024 07:00  --------------------------------------------------------  IN: 1867.9 mL / OUT: 3224 mL / NET: -1356.1 mL        ON EXAM:    General: NAD, intubated/sedated  HEENT: Mucous membranes are moist, anicteric  Lungs: Non-labored, breath sounds are decreased bilaterally, No wheezing, rales or rhonchi  Cardiovascular: Regular, S1 and S2, no murmurs, rubs, or gallops  Gastrointestinal: Bowel Sounds present, soft, nontender.   Lymph: chronic peripheral edema. No lymphadenopathy.  Skin: + rash  Psych:  unable to assess    LABS: All Labs Reviewed:                        9.9    17.43 )-----------( 120      ( 25 Apr 2024 08:40 )             31.2                         10.0   20.62 )-----------( 124      ( 25 Apr 2024 01:19 )             33.6                         10.2   14.11 )-----------( 126      ( 24 Apr 2024 18:21 )             33.7     25 Apr 2024 01:19    131    |  92     |  40     ----------------------------<  301    4.5     |  15     |  2.96   24 Apr 2024 18:21    133    |  92     |  52     ----------------------------<  335    6.1     |  15     |  3.89   24 Apr 2024 08:12    133    |  96     |  43     ----------------------------<  211    5.2     |  21     |  3.49     Ca    8.1        25 Apr 2024 01:19  Ca    7.8        24 Apr 2024 18:21  Ca    8.4        24 Apr 2024 08:12  Phos  4.4       25 Apr 2024 01:19  Phos  5.9       24 Apr 2024 18:21  Phos  3.5       24 Apr 2024 08:12  Mg     2.0       25 Apr 2024 01:19  Mg     2.2       24 Apr 2024 18:21  Mg     2.1       24 Apr 2024 08:12    TPro  6.0    /  Alb  2.8    /  TBili  0.7    /  DBili  x      /  AST  22     /  ALT  9      /  AlkPhos  56     25 Apr 2024 01:19  TPro  5.8    /  Alb  2.9    /  TBili  0.7    /  DBili  x      /  AST  17     /  ALT  10     /  AlkPhos  60     24 Apr 2024 18:21  TPro  6.3    /  Alb  3.0    /  TBili  0.7    /  DBili  x      /  AST  13     /  ALT  8      /  AlkPhos  60     24 Apr 2024 08:12    PT/INR - ( 25 Apr 2024 01:19 )   PT: 17.0 sec;   INR: 1.64 ratio         PTT - ( 25 Apr 2024 01:19 )  PTT:26.1 sec      Blood Culture:

## 2024-04-25 NOTE — PRE-ANESTHESIA EVALUATION ADULT - NSANTHPMHFT_GEN_ALL_CORE
Medical record erviewed and discussed with CCU and EP MD and care team
Medical record reviewed and discussed with surgeon and care team.

## 2024-04-25 NOTE — CHART NOTE - NSCHARTNOTEFT_GEN_A_CORE
Nutrition Follow Up Note  Patient seen for: follow up  Chart reviewed, events noted    Source: [] Patient       [x] Chart        [] RN        [] Family at bedside       [] Other:  - If unable to interview patient: [x] Trach/Vent/BiPAP  [] Disoriented/confused/inappropriate to interview    Diet Order:   Diet, NPO:      Special Instructions for Nursing:  Except medications (-)    Is current order appropriate/adequate? [x] Yes  []  No:     Nutrition-related concerns:  - s/p TAVR  c/b flash pulmonary edema requiring intubation per chart noted, remains intubated & NPO when seen  - was started on HD pre-OP noted and now converted to CRRT post-OP, nephrology following for management, intermittent hyponatremia noted, K/PO4/Mg remain WNL  - pressor support w/ vasopressin, sedation w/ propofol (~1185kcal/day if propofol rate is maintained, last TG level from  WNL which was 11 days pre-OP)    GI:  Last BM:   Bowel Regimen? [x] Yes: senna    Weights:   Daily Weight in k.2 (), Weight in k.7 (), Weight in k.3 (), Weight in k.5 (), Weight in k.1 (), Weight in k (), Weight in kG: 15.8 ()  - overall weight trend noted, has had intermittent fluctuations in above time frame w/ changes accounting from fluctuations in edema, HD treatment and now post-OP w/ TAVR and flash pulmonary edema post-OP, will continue to monitor weight trend    Nutritionally Pertinent MEDICATIONS  (STANDING):  ascorbic acid  atorvastatin  ferrous    sulfate  insulin regular Infusion  levothyroxine  multivitamin  nystatin    Suspension  senna  vasopressin Infusion    Pertinent Labs:  @ 15:22: Na 133<L>, BUN 36<H>, Cr 2.46<H>, <H>, K+ 3.8, Phos 2.8, Mg 2.2, Alk Phos 59, ALT/SGPT 11, AST/SGOT 19, HbA1c --   @ 08:40: Na 133<L>, BUN 36<H>, Cr 2.47<H>, <H>, K+ 3.7, Phos 2.8, Mg 2.1, Alk Phos 56, ALT/SGPT 7<L>, AST/SGOT 19, HbA1c --   @ 01:19: Na 131<L>, BUN 40<H>, Cr 2.96<H>, <H>, K+ 4.5, Phos 4.4, Mg 2.0, Alk Phos 56, ALT/SGPT 9<L>, AST/SGOT 22, HbA1c --   @ 18:21: Na 133<L>, BUN 52<H>, Cr 3.89<H>, <H>, K+ 6.1<H>, Phos 5.9<H>, Mg 2.2, Alk Phos 60, ALT/SGPT 10, AST/SGOT 17, HbA1c --    A1C with Estimated Average Glucose Result: 7.4 % (24 @ 08:21)  A1C with Estimated Average Glucose Result: 6.8 % (01-10-24 @ 08:37)    Finger Sticks:  POCT Blood Glucose.: 137 mg/dL ( @ 16:08)  POCT Blood Glucose.: 154 mg/dL ( @ 15:08)  POCT Blood Glucose.: 154 mg/dL ( @ 14:06)  POCT Blood Glucose.: 189 mg/dL ( @ 12:46)  POCT Blood Glucose.: 188 mg/dL ( @ 11:20)  POCT Blood Glucose.: 208 mg/dL ( @ 10:06)  POCT Blood Glucose.: 220 mg/dL ( @ 09:02)  POCT Blood Glucose.: 237 mg/dL ( @ 07:51)  POCT Blood Glucose.: 271 mg/dL ( @ 05:23)  POCT Blood Glucose.: 237 mg/dL ( @ 22:30)    Skin per nursing documentation: suspected DTI to sacrum per documentation  Edema: +3 generalized, +4 b/l foot edema per documentation    Estimated Needs:   [x] recalculated:   Estimated Caloric Needs: 2238-2984kcal/day (15-20kcal/kg)  Estimated Protein Needs: 99-132g/pro/day (1.5-2.0g/pro/kg)  Estimated Fluid Needs: defer to team  [place] [employer] Equation (WOODS) 2010: 2288kcal ()  based on actual .2kg for caloric needs, IBW 65.9kg for protein needs w/ considerations for acute/critical illness, CRRT, intubation, skin integrity w/ suspected DTI, BMI >40    Previous Nutrition Diagnosis: overweight/obesity  Nutrition Diagnosis is: [x] ongoing  [] resolved [] not applicable     New Nutrition Diagnosis: Inadequate energy intake related to acute/critical illness as evidenced by pt remains intubated & NPO    Nutrition Care Plan:  [x] In Progress  [] Achieved  [] Not applicable    Nutrition Interventions:     Education Provided:       [] Yes:  [x] No:     Recommendations:      1. remains NPO - if patient is able to be extubated, diet advancement deferred to team  2. if patient needs to remain intubated and TFs are requested, would recommend TF regimen of:      - TFs of Vital AF @ 10cc/hr, increasing by 10cc/hr Q6H until goal rate of 60cc/hr x24 hours (1440cc total volume)      - combined regimen (if propofol rate is maintained) would provide 2913kcal, 108g protein, 1168cc free H2O daily (20kcal/kg based on most recent .2kg, 1.6g/pro/kg based on IBW 65.9kg w/ consideration for BMI >40)       3. if propofol rate is decreased and/or is discontinued, would instead recommend TF regimen w/ Nepro @ 10cc/hr, increasing by 10cc/hr Q6H as tolerated until goal rate of 60cc/hr x24 hours (1440cc total volume)      - above regimen would provide 2592kcal, 117g protein, 1047cc free H2O daily (17kcal/kg based on most recent .2kg, 1.8g/pro/kg based on IBW 65.9kg w/ consideration for BMI >40)  4. defer fluid management to team  5. monitor progression of nutrition status, weight trend, electrolytes, blood glucose levels, labs, BMs, wound healing    Monitoring and Evaluation:   Continue to monitor nutritional intake, tolerance to diet prescription, weights, labs, skin integrity    RD remains available upon request and will follow up per protocol  Jluis Rosado RD, CDN - contact on TEAMS.

## 2024-04-25 NOTE — PROGRESS NOTE ADULT - ASSESSMENT
Assessment:  72 y/o female w/ PMH: HFrEF (EF 30%), AS, LBBB, HTN, IDDM1, CKD, hypothyroidism, chronic lymphedema, suspected OHS/LELIA (3L NC home O2) p/w 1 episode of syncope most likely 2/2 moderate to severe AS, admitted to floors for TAVR w/u. Course complicated by suspected contrast induced allergic reaction (had CTA for TAVR eval) and contrast related renal failure (acute on chronic) requiring HD. Pt. went for TAVR on 2/24 which was c/b VT and vfib requiring shock and flash pulmonary edema causing AHRF requiring intubation. Currently undergoing HD for hyperkalemia and fluid overload. Currently on pressor support for possible vasoplegic shock, all of which require management in CICU.    Plan:    NEURO:  - Intubated, sedated w/ propofol, titrate for RASS -1  - A&Ox3 at baseline  - Monitor mental status per protocol  - SAT when more stable      PULM  # Acute hypoxic respiratory failure most likely 2/2 flash pulmonary edema  # OHS/LELIA  - c/w full vent support: AC/VC: 600/16/8/80%  - f/u CXR  - SBTs when able  - Trend ABGs, wean vent as able    CV  # VT/vfib s/p shock  # Known LBBB  - Monitor on tele  - Trend lytes    # 1st degree AVB w/ PACs  # Severe AS s/p TAVR  - TTE in AM  - c/w ASA  - c/w atorvastatin     # Vasolplegia most likely 2/2 sedation   - c/w vaso, titrate to MAP >=65  - Trend lactate    # HFrEF  - f/u TTE in AM  - Holding GDMT while on vaso    /RENAL  # DUNCAN on CKD requring HD c/b hyperkalemia  - Requiring HD, currenly undergoing  - Acutely remove fluid w/ HD, then CRRT overnight  - Strict I/Os  - Trend BMP, lytes  - Avoid nephrotoxins    GI  - NPO    ENDO  # IDDM1  - ISS  - Lantus 8  - Monitor glucose q6hrs while NPO    # Hypothyroidism  - c/w home levothyroxine     SKIN  # Acute generalized exanthematous pustulosis due to drug  - c/w calamine  - c/w vaseline    # Lymphedema   - Elevate legs  - Compression stockings, ACE wrapping    ID  - Monitor WBC and for fevers    # Oral Candidiasis  - c/w nystatin     Patient requires continuous monitoring with bedside rhythm monitoring, pulse ox monitoring, and intermittent blood gas analysis. Care plan discussed with ICU care team. Patient remained critical and at risk for life threatening decompensation.  Patient seen, examined and plan discussed with CCU team during rounds.     I have personally provided 35 minutes of critical care time excluding time spent on separate procedures, in addition to initial critical care time provided by the CICU Attending, Dr. Quintero.    Becka Martinez, [facility]  Assessment:  74 y/o female w/ PMH: HFrEF (EF 30%), AS, LBBB, HTN, IDDM1, CKD, hypothyroidism, chronic lymphedema, suspected OHS/LELIA (3L NC home O2) p/w 1 episode of syncope most likely 2/2 moderate to severe AS, admitted to floors for TAVR w/u. Course complicated by suspected contrast induced allergic reaction (had CTA for TAVR eval) and contrast related renal failure (acute on chronic) requiring HD. Pt. went for TAVR on 2/24 which was c/b VT and vfib requiring shock and flash pulmonary edema causing AHRF requiring intubation. Currently undergoing HD for hyperkalemia and fluid overload. Currently on pressor support for possible vasoplegic shock, all of which require management in CICU.    Plan:    NEURO:  - Intubated, sedated w/ propofol, titrate for RASS -1  - A&Ox3 at baseline  - Monitor mental status per protocol  - SAT when more stable      PULM  # Acute hypoxic respiratory failure most likely 2/2 flash pulmonary edema  # OHS/LELIA  - c/w full vent support: AC/VC: 600/16/8/80%  - SBTs when able  - Trend ABGs, wean vent as able    CV  # VT/vfib s/p shock  # Known LBBB  - Monitor on tele  - Trend lytes    # 1st degree AVB w/ PACs  # Severe AS s/p TAVR  - f/u TTE  - c/w ASA  - c/w atorvastatin     # Vasolplegia most likely 2/2 sedation   - c/w vaso, titrate to MAP >=65  - Trend lactate    # HFrEF  - f/u TTE  - Holding GDMT while on vaso    /RENAL  # DUNCAN on CKD requiring HD c/b hyperkalemia  - CRRT  - Strict I/Os  - Trend BMP, lytes  - Avoid nephrotoxins    GI  - NPO    ENDO  # IDDM1  # DKA  - Insulin drip  - q6h BMP    # Hypothyroidism  - c/w home levothyroxine     SKIN  # Acute generalized exanthematous pustulosis due to drug  - c/w calamine  - c/w vaseline    # Psoriasis   # Lymphedema   - Elevate legs  - Compression stockings, ACE wrapping    ID  - Monitor WBC and for fevers    # Oral Candidiasis  - c/w nystatin     Patient requires continuous monitoring with bedside rhythm monitoring, pulse ox monitoring, and intermittent blood gas analysis. Care plan discussed with ICU care team. Patient remained critical and at risk for life threatening decompensation.  Patient seen, examined and plan discussed with CCU team during rounds.

## 2024-04-25 NOTE — PROGRESS NOTE ADULT - SUBJECTIVE AND OBJECTIVE BOX
24H hour events: s/p TAVR 4/24, remains intubated and sedated on Dip  Conducting in SR 1:1 50's w/ frequent episodes of Wenckebach and rare pacing at VVI 30    MEDICATIONS:  aspirin  chewable 81 milliGRAM(s) Oral daily    nystatin    Suspension 098813 Unit(s) Oral four times a day      propofol Infusion 10 MICROgram(s)/kG/Min IV Continuous <Continuous>    polyethylene glycol 3350 17 Gram(s) Oral two times a day PRN  senna 2 Tablet(s) Oral at bedtime    atorvastatin 80 milliGRAM(s) Oral at bedtime  insulin regular Infusion 3 Unit(s)/Hr IV Continuous <Continuous>  levothyroxine 75 MICROGram(s) Oral daily  vasopressin Infusion 0.06 Unit(s)/Min IV Continuous <Continuous>    ascorbic acid 500 milliGRAM(s) Oral daily  calamine/zinc oxide Lotion 1 Application(s) Topical three times a day  chlorhexidine 0.12% Liquid 15 milliLiter(s) Oral Mucosa every 12 hours  chlorhexidine 2% Cloths 1 Application(s) Topical daily  ferrous    sulfate 325 milliGRAM(s) Oral two times a day  multivitamin 1 Tablet(s) Oral daily  petrolatum white Ointment 1 Application(s) Topical three times a day      REVIEW OF SYSTEMS:  See HPI, otherwise ROS negative.    PHYSICAL EXAM:  T(C): 36.4 (04-25-24 @ 10:19), Max: 36.7 (04-24-24 @ 19:15)  HR: 54 (04-25-24 @ 10:19) (32 - 85)  BP: 138/63 (04-25-24 @ 10:19) (98/50 - 138/63)  RR: 15 (04-25-24 @ 10:19) (3 - 35)  SpO2: 100% (04-25-24 @ 10:19) (89% - 100%)  Wt(kg): --  I&O's Summary    24 Apr 2024 07:01  -  25 Apr 2024 07:00  --------------------------------------------------------  IN: 1867.9 mL / OUT: 3394 mL / NET: -1526.1 mL    25 Apr 2024 07:01  -  25 Apr 2024 12:27  --------------------------------------------------------  IN: 122.8 mL / OUT: 340 mL / NET: -217.2 mL        Appearance: Intubated, sedated. NAD	  Cardiovascular: +S1S2 RRR no m/g/r  Respiratory: CTA B/L	  Psychiatry: A & O x 3, Mood & affect appropriate  Gastrointestinal:  Soft, NT. ND. +BS	  Skin: No rashes	masses or lesions  Neurologic: Non-focal  Extremities: No edema BLE  Vascular: Peripheral pulses palpable 2+ bilaterally      LABS:	 	    CBC Full  -  ( 25 Apr 2024 08:40 )  WBC Count : 17.43 K/uL  Hemoglobin : 9.9 g/dL  Hematocrit : 31.2 %  Platelet Count - Automated : 120 K/uL  Mean Cell Volume : 88.4 fl  Mean Cell Hemoglobin : 28.0 pg  Mean Cell Hemoglobin Concentration : 31.7 gm/dL  Auto Neutrophil # : 16.05 K/uL  Auto Lymphocyte # : 0.77 K/uL  Auto Monocyte # : 0.00 K/uL  Auto Eosinophil # : 0.16 K/uL  Auto Basophil # : 0.00 K/uL  Auto Neutrophil % : 89.5 %  Auto Lymphocyte % : 4.4 %  Auto Monocyte % : 0.0 %  Auto Eosinophil % : 0.9 %  Auto Basophil % : 0.0 %    04-25    133<L>  |  96  |  36<H>  ----------------------------<  237<H>  3.7   |  18<L>  |  2.47<H>  04-25    131<L>  |  92<L>  |  40<H>  ----------------------------<  301<H>  4.5   |  15<L>  |  2.96<H>    Ca    8.1<L>      25 Apr 2024 08:40  Ca    8.1<L>      25 Apr 2024 01:19  Phos  2.8     04-25  Phos  4.4     04-25  Mg     2.1     04-25  Mg     2.0     04-25    TPro  5.9<L>  /  Alb  3.0<L>  /  TBili  0.7  /  DBili  x   /  AST  19  /  ALT  7<L>  /  AlkPhos  56  04-25  TPro  6.0  /  Alb  2.8<L>  /  TBili  0.7  /  DBili  x   /  AST  22  /  ALT  9<L>  /  AlkPhos  56  04-25        TSH: Thyroid Stimulating Hormone, Serum: 5.06 uIU/mL (04.14.24 @ 07:18)      TELEMETRY: NSR w/ periods of Wenckebach and rare pacing  	    TTE: < from: TTE W or WO Ultrasound Enhancing Agent (04.25.24 @ 09:27) >     CONCLUSIONS:      1. Left ventricular cavity is moderately dilated. Left ventricular systolic function is severely decreased with an ejection fraction of 25 % by Nunes's method of disks. Global left ventricular hypokinesis.   2. There is no evidence of a left ventricular thrombus.   3. There is moderate (grade 2) left ventricular diastolic dysfunction, withelevated filling pressure.   4. There is calcification of the mitral valve annulus.   5. A Benitez Bakari (TAVR) valve replacement is present in the aortic position The prosthetic valve is well seated. No paravalvular regurgitation.   6. Moderately enlarged right ventricular cavity size, with normal wall thickness, and normal systolic function.   7. The right atrium is moderately dilated.   8. Left pleural effusion noted.   9. No pericardial effusion seen.  10. Compared to the transthoracic echocardiogram performed on 4/24/2024, there have been no significant interval changes.    ________________________________________________________________________________________  FINDINGS:     Left Ventricle:  After obtaining consent, Definity ultrasoundenhancing agent was given for enhanced left ventricular opacification and improved delineation of the left ventricular endocardial borders. The left ventricular cavity is moderately dilated. Left ventricular systolic function is severely decreased with a calculated ejection fraction of 25 % by the Nunes's biplane method of disks. There is global left ventricular hypokinesis. There is moderate (grade 2) left ventricular diastolic dysfunction, with elevated filling pressure. Severe left ventricular hypertrophy. There is increased LV mass and concentric hypertrophy. There is no evidence of a left ventricular thrombus.     Right Ventricle:  The right ventricular cavity is moderately enlarged in size, with normal wall thickness and normal systolic function. Tricuspid annular plane systolic excursion (TAPSE) is 2.2 cm (normal >=1.7 cm). Tricuspid annular tissue Doppler S' is 6.2 cm/s (normal >10 cm/s). A device lead is visualized.     Left Atrium:  The left atrium is normal in size with an indexed volume of 33.15 ml/m².     Right Atrium:  The right atrium is moderately dilated with an indexed volume of 39.11 ml/m².     Interatrial Septum:  The interatrial septum appears intact.     Aortic Valve:  A 23 mm +2 cc, Benitez Bakari (TAVR) is present in the aortic position. The prosthetic valve is well seated. There is no paravalvular regurgitation. The peak transaortic velocity is 2.07 m/s, peak transaortic gradient is 17.1 mmHg and mean transaortic gradient is 10.0 mmHg with an LVOT/aorticvalve VTI ratio of 0.37. The aortic valve area is estimated at 1.80 cm² by the continuity equation.     Mitral Valve:  There is calcification of the mitral valve annulus. There is mild leaflet calcification. There is no mitral valve stenosis. There is mild mitral regurgitation.     Tricuspid Valve:  Structurally normal tricuspid valve with normal leaflet excursion. There is trace tricuspid regurgitation.     Pulmonic Valve:  The pulmonic valve was not well visualized. Structurally normal pulmonicvalve with normal leaflet excursion. There is trace pulmonic regurgitation.     Aorta:  The aortic root appears normal in size.     Pericardium:  No pericardial effusion seen.     Pleura:  Left pleural effusion noted.     Systemic Veins:  The inferior vena cava is dilated measuring 2.54 cm in diameter, (dilated >2.1cm) with abnormal inspiratory collapse (abnormal <50%) consistent with elevated right atrial pressure (~15, range 10-20mmHg).  ____________________________________________________________________  QUANTITATIVE DATA:  Left Ventricle Measurements: (Indexed to BSA)     IVSd (2D):   1.2 cm  LVPWd (2D):  1.4 cm  LVIDd (2D):  5.8 cm  LVIDs (2D):  4.9 cm  LV Mass:     336 g  130.1 g/m²  BiPlane LV EF%: 25 %     MV E Vmax:    0.88 m/s  MV A Vmax:    1.00 m/s  MV E/A:       0.88  e' lateral:   3.60 cm/s  e' medial:    2.19 cm/s  E/e' lateral: 24.39  E/e' medial:  40.09  E/e' Average: 30.33    Aorta Measurements: (Normal range) (Indexed to BSA)     Sinuses of Valsalva: 3.10 cm (2.7 - 3.3cm)  Ao Asc prox:         3.80 cm       Left Atrium Measurements: (Indexed to BSA)  LA Diam 2D:        4.90 cm  LA Vol s, MOD A4C: 111.00 ml.  LA Vol s, MOD A2C: 64.60 ml.  LA Vol s, MOD BP:  85.60 ml   33.15 ml/m²    Right Ventricle Measurements: Right Atrial Measurements:     TAPSE:           2.2 cm       RA Vol:       101.00 ml  RV Base (RVID1): 4.8 cm       RA Vol Index: 39.11 ml/m²  RV Mid (RVID2):  4.2 cm       LVOT / RVOT/ Qp/Qs Data: (Indexed to BSA)  LVOT Diameter:  2.50 cm  LVOT Area:   4.91 cm²  LVOT Vmax:      0.82 m/s  LVOT Vmn:       0.540 m/s  LVOT VTI:       22.00 cm  LVOT peak grad: 3 mmHg  LVOT mean grad: 1.0 mmHg  LVOT SV:        108.0 ml  41.82 ml/m²    Aortic Valve Measurements:  AV Vmax:                2.1 m/s  AV Peak Gradient:       17.1 mmHg  AV Mean Gradient:       10.0 mmHg  AV VTI:                 59.9 cm  AV VTI Ratio:           0.37  AoV EOA, Contin:        1.80 cm²  AoV EOA, Contin i:      0.70 cm²/m²  AoV Dimensionless Index 0.37    Mitral Valve Measurements:     MV E Vmax: 0.9 m/s  MV A Vmax: 1.0 m/s  MV E/A:    0.9       Tricuspid Valve Measurements:     TV S'        6.2 cm/s  RA Pressure: 15 mmHg    ________________________________________________________________________________________    < end of copied text >

## 2024-04-25 NOTE — PROGRESS NOTE ADULT - ASSESSMENT
72 y/o F with PMHx of HFrEF (EF 30%), mod AS, known 1st degree AVB and LBBB, HTN, IDDM1, CKD, hypothyroidism, chronic lymphedema, suspected OHS/LELIA on 3L NC home O2 p/w 1 episode of syncope. Recent admission at [facility] (3/29-4/5) for ADHF and DUNCAN s/p diuresis.   Pt states she does not remember her syncopal episode. She remembers walking to the car and getting into it. She denies any preceding symptoms. She was told that she "blacked out" and was not responding for 10-15 seconds with arm shaking. EKG consistent with sinus bradycardia at 58bpm, 1st degree AVB (AUDRA 318ms) with LBBB (QRSd 176). Early AM 4/14, she was noted to have 2:1 AV block. Pt also with e/o Wenckebach on 4/14.  She was on Toprol XL 100mg at home but has not taken it since 4/12.    She is s/p TAVR (Emmanuel) on 4/24 with Dr. Martell, post procedure remains intubated and sedated on low dose Vaso this AM. Early AM w/ Wenckebach 30-40's, CICU placed TVP. She is conducting 1:1 in SR w/ baseline LBBB this AM with Wenckebach and rare pacing at VVI30.   She is NPO for AV Micra today.     1) Syncope  2) 2:1 AV block with known 1st degree AVB (Audra 318ms) with LBBB (QRSd 176)  3) HFrEF with LVEF 30%  4) Aortic stenosis, severe   5) Lymphedema with significant LE edema and rash    Recommendations:  - For AV Micra today. Ultimately patient may warrant CRT-D placement noting conduction disease and cardiomyopathy with LVEF 30% and LBBB with QRSd 176. However, at this time she is not a candidate for CRT at this time noting infectious risk due to LE swelling/cellulitis and ESRD on HD via temporary HDC  - Can d/c TVP s/p placement of AV Micra   - S/p TAVR 4/24 w/ TTE today: no paravalvular leak, LVEF 25% with global hypokinesis, elevated filling pressure, moderately enlarged RA/RV   - ID following for potential infectious etiology of fever. Likely allergic reaction to contrast. Off antibiotics. No further fevers noted  - Rest of care per CICU, d/w team, Dr. Ashton and patient's partner at bedside this AM

## 2024-04-25 NOTE — PROGRESS NOTE ADULT - ATTENDING COMMENTS
Agree with Dr. Astudillo's assessment and plan as outlined above. Reviewed all pertinent labs, and imaging studies. Modifications made as indicated above. Following this 73 F for adrenal adenoma and T1D management. For adrenal hormone workup, patient found to have nonsuppressed cortisol with 1 mg DST with appropriate dexamethasone level. Pending 24 hour urine cortisol for further evaluation. Plasma metaneprhine negative. Aldosterone elevated, renin pending. For T1D, A1C 7.4. Cotninue with insulin drip for now. Rest of the plan as above.   Complex case, high risk patient, high-level decision-making, requiring ICU level of care.

## 2024-04-25 NOTE — PROGRESS NOTE ADULT - SUBJECTIVE AND OBJECTIVE BOX
PATIENT:  EVELYN LOPEZ  [id number]    CHIEF COMPLAINT:  Patient is a 73y old female who presents with a chief complaint of Syncope (2024 14:14)    INTERVAL HISTORY:  - Extubated to [facility]  - s/p Micra  - Infectious w/u sent due to leukocytosis    MEDICATIONS:  MEDICATIONS  (STANDING):  ascorbic acid 500 milliGRAM(s) Oral daily  aspirin  chewable 81 milliGRAM(s) Oral daily  atorvastatin 80 milliGRAM(s) Oral at bedtime  calamine/zinc oxide Lotion 1 Application(s) Topical three times a day  chlorhexidine 2% Cloths 1 Application(s) Topical daily  CRRT Treatment    <Continuous>  ferrous    sulfate 325 milliGRAM(s) Oral two times a day  insulin regular Infusion 3 Unit(s)/Hr (3 mL/Hr) IV Continuous <Continuous>  levothyroxine 75 MICROGram(s) Oral daily  multivitamin 1 Tablet(s) Oral daily  nystatin    Suspension 474007 Unit(s) Oral four times a day  petrolatum white Ointment 1 Application(s) Topical three times a day  PrismaSATE Dialysate BGK 4 / 2.5 5000 milliLiter(s) (1400 mL/Hr) CRRT <Continuous>  PrismaSOL Filtration BGK 0 / 2.5 5000 milliLiter(s) (800 mL/Hr) CRRT <Continuous>  PrismaSOL Filtration BGK 4 / 2.5 5000 milliLiter(s) (600 mL/Hr) CRRT <Continuous>  senna 2 Tablet(s) Oral at bedtime  vasopressin Infusion 0.06 Unit(s)/Min (9 mL/Hr) IV Continuous <Continuous>    MEDICATIONS  (PRN):  polyethylene glycol 3350 17 Gram(s) Oral two times a day PRN Constipation    ALLERGIES:  contrast media (iodine-based) (Fever; Vomiting; Flushing; Hypotension; Rash; Stomach Upset)  Ativan (Rash; Urticaria)  penicillins (Hives (Mod to Severe); Anaphylaxis (Mod to Severe); Short breath (Mod to Severe); Angioedema (Mod to Severe))    OBJECTIVE:  ICU Vital Signs Last 24 Hrs  T(C): 36.8 (2024 19:00), Max: 36.8 (2024 19:00)  T(F): 98.2 (2024 19:00), Max: 98.2 (2024 19:00)  HR: 66 (2024 19:15) (32 - 85)  BP: 138/63 (2024 10:19) (98/50 - 138/63)  BP(mean): 90 (2024 10:19) (69 - 90)  ABP: 127/43 (2024 19:15) (83/30 - 166/70)  ABP(mean): 72 (2024 19:15) (46 - 106)  RR: 15 (2024 19:15) (3 - 35)  SpO2: 100% (2024 19:15) (89% - 100%)    O2 Parameters below as of :00  Patient On (Oxygen Delivery Method): nasal cannula, high flow  O2 Flow (L/min): 50  O2 Concentration (%): 50    CAPILLARY BLOOD GLUCOSE  POCT Blood Glucose.: 155 mg/dL (2024 20:07)  POCT Blood Glucose.: 136 mg/dL (2024 19:17)  POCT Blood Glucose.: 121 mg/dL (2024 18:09)  POCT Blood Glucose.: 125 mg/dL (2024 16:55)  POCT Blood Glucose.: 137 mg/dL (2024 16:08)  POCT Blood Glucose.: 154 mg/dL (2024 15:08)  POCT Blood Glucose.: 154 mg/dL (2024 14:06)    I&O's Summary    2024 07:  -  2024 07:00  --------------------------------------------------------  IN: 1867.9 mL / OUT: 3394 mL / NET: -1526.1 mL    2024 07:01  -  2024 20:18  --------------------------------------------------------  IN: 522.4 mL / OUT: 950 mL / NET: -427.6 mL    Daily Height in cm: 180.34 (2024 10:19)    Daily Weight in k.2 (2024 05:00)    LABS:  ABG - ( 2024 18:53 )  pH, Arterial: 7.32  pH, Blood: x     /  pCO2: 48    /  pO2: 152   / HCO3: 25    / Base Excess: -1.6  /  SaO2: 100.0                       9.9    17.43 )-----------( 120      ( 2024 08:40 )             31.2     04-25    133<L>  |  98  |  36<H>  ----------------------------<  145<H>  3.8   |  22  |  2.46<H>    Ca    7.9<L>      2024 15:22  Phos  2.8     04-25  Mg     2.2     04-25    TPro  6.0  /  Alb  2.9<L>  /  TBili  0.7  /  DBili  x   /  AST  19  /  ALT  11  /  AlkPhos  59  04-25    LIVER FUNCTIONS - ( 2024 15:22 )  Alb: 2.9 g/dL / Pro: 6.0 g/dL / ALK PHOS: 59 U/L / ALT: 11 U/L / AST: 19 U/L / GGT: x           PT/INR - ( 2024 08:40 )   PT: 15.6 sec;   INR: 1.43 ratio    PTT - ( 2024 08:40 )  PTT:25.6 sec

## 2024-04-25 NOTE — PROGRESS NOTE ADULT - SUBJECTIVE AND OBJECTIVE BOX
*****Structural Heart Team*****    Subjective:    Ms. Chowdhury remains intubated and in the CICU on CVVH. She had an episode of Heart Block again overnight, and is s/p AV Micra today. She is off of sedation and just on a little bit of vasopressin.    PAST MEDICAL & SURGICAL HISTORY:  Hypertension    Diabetes    Lymphedema    H/O left bundle branch block    History of left bundle branch block (LBBB)    Systolic heart failure, chronic    Type 1 diabetes    H/O cataract  2020    History of surgical removal of meniscus of knee  left in 1971    Frozen shoulder  2000    H/O Achilles tendon repair  lengthened bilaterally, 2000    Fractured skull  1968          T(C): 34.4 (04-25-24 @ 12:00), Max: 36.7 (04-24-24 @ 19:15)  HR: 53 (04-25-24 @ 13:45) (32 - 85)  BP: 138/63 (04-25-24 @ 10:19) (98/50 - 138/63)  RR: 14 (04-25-24 @ 13:45) (3 - 35)  SpO2: 100% (04-25-24 @ 13:45) (89% - 100%)  Wt(kg): --  04-24 @ 07:01  -  04-25 @ 07:00  --------------------------------------------------------  IN: 1867.9 mL / OUT: 3394 mL / NET: -1526.1 mL    04-25 @ 07:01  -  04-25 @ 14:15  --------------------------------------------------------  IN: 363.9 mL / OUT: 340 mL / NET: 23.9 mL      MEDICATIONS  (STANDING):  ascorbic acid 500 milliGRAM(s) Oral daily  aspirin  chewable 81 milliGRAM(s) Oral daily  atorvastatin 80 milliGRAM(s) Oral at bedtime  calamine/zinc oxide Lotion 1 Application(s) Topical three times a day  chlorhexidine 0.12% Liquid 15 milliLiter(s) Oral Mucosa every 12 hours  chlorhexidine 2% Cloths 1 Application(s) Topical daily  CRRT Treatment    <Continuous>  ferrous    sulfate 325 milliGRAM(s) Oral two times a day  insulin regular Infusion 3 Unit(s)/Hr (3 mL/Hr) IV Continuous <Continuous>  levothyroxine 75 MICROGram(s) Oral daily  multivitamin 1 Tablet(s) Oral daily  nystatin    Suspension 688182 Unit(s) Oral four times a day  petrolatum white Ointment 1 Application(s) Topical three times a day  PrismaSATE Dialysate BGK 4 / 2.5 5000 milliLiter(s) (1400 mL/Hr) CRRT <Continuous>  PrismaSOL Filtration BGK 0 / 2.5 5000 milliLiter(s) (800 mL/Hr) CRRT <Continuous>  PrismaSOL Filtration BGK 4 / 2.5 5000 milliLiter(s) (600 mL/Hr) CRRT <Continuous>  propofol Infusion 10 MICROgram(s)/kG/Min (8.98 mL/Hr) IV Continuous <Continuous>  senna 2 Tablet(s) Oral at bedtime  vasopressin Infusion 0.06 Unit(s)/Min (9 mL/Hr) IV Continuous <Continuous>    MEDICATIONS  (PRN):  polyethylene glycol 3350 17 Gram(s) Oral two times a day PRN Constipation      Review of Symptoms:  Constitutional: Sedated  Respiratory: Intubated  Cardiac: Denies CP, Denies Palpitations  Gastrointestinal: Denies Pain, Denies N/V, tolerating po intake  Vascular: Negative  Extremities: + Edema, No joint pain or swelling  Neurological: Sedated  Endocrine: No heat or cold intolerance, No excessive thirst  Heme/Onc: Negative    Exam:  General: Sedated  HEENT: Supple, No JVD, Trachea midline, no masses  Pulmonary: On full vent support, not currently overbreathing the vent  Cor: S1S2, RRR, No murmur noted  ECG: SR  Groin/Wound: Soft, NT, no hematoma  Gastrointestinal: Soft, NT/ND, + Bowel Sounds  Neuro: Sedated  Vascular: 1+ Pulses B/L, 2+ Edema  Extremities: No joint pain or swelling  Skin: Warm/Dry/Normal color, Normal turgor, no rashes                          9.9    17.43 )-----------( 120      ( 25 Apr 2024 08:40 )             31.2   04-25    133<L>  |  96  |  36<H>  ----------------------------<  237<H>  3.7   |  18<L>  |  2.47<H>    Ca    8.1<L>      25 Apr 2024 08:40  Phos  2.8     04-25  Mg     2.1     04-25    TPro  5.9<L>  /  Alb  3.0<L>  /  TBili  0.7  /  DBili  x   /  AST  19  /  ALT  7<L>  /  AlkPhos  56  04-25  PT/INR - ( 25 Apr 2024 08:40 )   PT: 15.6 sec;   INR: 1.43 ratio         PTT - ( 25 Apr 2024 08:40 )  PTT:25.6 sec    Imaging Reviewed:    Echocardiogram:    < from: TTE W or WO Ultrasound Enhancing Agent (04.25.24 @ 09:27) >  CONCLUSIONS:      1. Left ventricular cavity is moderately dilated. Left ventricular systolic function is severely decreased with an ejection fraction of 25 % by Nunes's method of disks. Global left ventricular hypokinesis.   2. There is no evidence of a left ventricular thrombus.   3. There is moderate (grade 2) left ventricular diastolic dysfunction, withelevated filling pressure.   4. There is calcification of the mitral valve annulus.   5. A Benitez Bakari (TAVR) valve replacement is present in the aortic position The prosthetic valve is well seated. No paravalvular regurgitation.   6. Moderately enlarged right ventricular cavity size, with normal wall thickness, and normal systolic function.   7. The right atrium is moderately dilated.   8. Left pleural effusion noted.   9. No pericardial effusion seen.  10. Compared to the transthoracic echocardiogram performed on 4/24/2024, there have been no significant interval changes.    ________________________________________________________________________________________  FINDINGS:     Left Ventricle:  After obtaining consent, Definity ultrasoundenhancing agent was given for enhanced left ventricular opacification and improved delineation of the left ventricular endocardial borders. The left ventricular cavity is moderately dilated. Left ventricular systolic function is severely decreased with a calculated ejection fraction of 25 % by the Nunes's biplane method of disks. There is global left ventricular hypokinesis. There is moderate (grade 2) left ventricular diastolic dysfunction, with elevated filling pressure. Severe left ventricular hypertrophy. There is increased LV mass and concentric hypertrophy. There is no evidence of a left ventricular thrombus.     Right Ventricle:  The right ventricular cavity is moderately enlarged in size, with normal wall thickness and normal systolic function. Tricuspid annular plane systolic excursion (TAPSE) is 2.2 cm (normal >=1.7 cm). Tricuspid annular tissue Doppler S' is 6.2 cm/s (normal >10 cm/s). A device lead is visualized.     Left Atrium:  The left atrium is normal in size with an indexed volume of 33.15 ml/m².     Right Atrium:  The right atrium is moderately dilated with an indexed volume of 39.11 ml/m².     Interatrial Septum:  The interatrial septum appears intact.     Aortic Valve:  A 23 mm +2 cc, Benitez Bakari (TAVR) is present in the aortic position. The prosthetic valve is well seated. There is no paravalvular regurgitation. The peak transaortic velocity is 2.07 m/s, peak transaortic gradient is 17.1 mmHg and mean transaortic gradient is 10.0 mmHg with an LVOT/aorticvalve VTI ratio of 0.37. The aortic valve area is estimated at 1.80 cm² by the continuity equation.     Mitral Valve:  There is calcification of the mitral valve annulus. There is mild leaflet calcification. There is no mitral valve stenosis. There is mild mitral regurgitation.     Tricuspid Valve:  Structurally normal tricuspid valve with normal leaflet excursion. There is trace tricuspid regurgitation.     Pulmonic Valve:  The pulmonic valve was not well visualized. Structurally normal pulmonicvalve with normal leaflet excursion. There is trace pulmonic regurgitation.     Aorta:  The aortic root appears normal in size.     Pericardium:  No pericardial effusion seen.     Pleura:  Left pleural effusion noted.     Systemic Veins:  The inferior vena cava is dilated measuring 2.54 cm in diameter, (dilated >2.1cm) with abnormal inspiratory collapse (abnormal <50%) consistent with elevated right atrial pressure (~15, range 10-20mmHg).    < end of copied text >          Assesment/Plan:    73 female s/p Femoral TAVR with 23 mm Bakari 3 Resilia Valve for Severe Aortic Stenosis, Acute on Chronic Renal Failure, Heart Block    1.) S/P TAVR: ASA 81 mg po daily, Continue to Monitor Groins. Ambulate as tolerated. POD#1 TTE looks good, valve well seated, no PVL  2.) ESRD: Continue with CVVH as per the renal team and switch to HD per their recommendations. Continue to monitor her Creatinine  3.) Heart Block: Patient received an AV Micra PPM today, however she may need CRT-D in future. Will continue to monitor and discuss with EPS. She will not be sent home with an MCOT monitor.  4.) Continue to allow patient to wake up from sedation. Wean from ventilator when stable. Continue with CICU monitoring.  5.) Discharge Plan: Patient is to follow up with Dr. Mcknight in 1 week post discharge. She should then follow up with her Cardiologist  in 30 days, with a repeat Transthoracic Echo to be done at that visit.    Discussed with Dr. Martell. Also discussed with her partner who was at bedside.    TABATHA Horne  [id number]

## 2024-04-25 NOTE — AIRWAY REMOVAL NOTE  ADULT & PEDS - ARTIFICAL AIRWAY REMOVAL COMMENTS
Written order for extubation verified. The patient was identified by full name and birth date compared to the identification band.  Present during the procedure was Ravinder MAYS.

## 2024-04-25 NOTE — PROCEDURE NOTE - NSNEEDLEGAUGE_GEN_A_CORE
Lactation Rounds.  Mother is not in NICU today.  Primary nurse reports mother is still bringing in expressed breast milk.    Advised primary nurse to ask mother to call for lactation for any pumping assistance required.  
18
18

## 2024-04-26 NOTE — PROGRESS NOTE ADULT - ATTENDING COMMENTS
73 F w/ bicuspid severe AS s/p TAVR w/ course c/b bradycardia.    Neuro: Off sedation, baseline mental status   Resp: Extubated, weaned to NC. Continue to wean   CVS: Vasoplegia requiring vaso, slowly weaning. s/p micrappm and TAVR  Renal: c/w CVVH for aggressive volume removal.  GI: PO diet as tolerated, bowel regimen PRN  ID: Leukocytosis, persistent, cultures sent. Start empiric vanc, possible cellulits  Endo: Insulin gtt weaned off, c/w lantus and pre-meals, ISS  Heme: HSQ  KIRKJ intro, d/c today

## 2024-04-26 NOTE — PROGRESS NOTE ADULT - ASSESSMENT
73F w HFrEF (EF 30%), mod AS, known LBBB, HTN, IDDM1, CKD, hypothyroidism, chronic lymphedema, suspected OHS/LELIA on 3L NC home O2 p/w 1 episode of syncope. Recent admission at [facility] (3/29-4/5) for ADHF and DUNCAN s/p diuresis, discharged with new home O2 3L NC suspecting OHS/LELIA pending outpatient w/u. Since discharge a week ago, she has been staying at home with   Pt had sob walking to car. Once in car, she was still breathing heavily. Partner noticed pt was not responding to verbal stimuli for 10-15sec and witnessed R arm jerking. Pt gained consciousness quickly without postictal symptoms. Pt went to Cardiologist Dr. Nathan who recommended pt to come to ED. No fever, cp, abd pain, n/v. Leg swelling is improved from prior. Pt on torsemide 40mg which she has been taking. Pt was discharged on 3LNC which she is currently on. Patient reports she did not take Farxiga that she was discharged on as she was concerned about side effects.     In ED, patient was found afebrile, hemodynamically stable, breathing well on 3L NC. Initial labs notable for mild hyponatremia, DUNCAN on CKD, elevated proBNP and elevated pCO2 both improved from prior.  pt also noticed with abn creatinine      1- CKD IV  with DUNCAN   2- CHF   3- lymphedema   4- severe AS  s/p tavr 4/24  5- syncope   6- hyperkalemia  7- hypotension         suspect ATN from hypotension along with contrast nephropathy   creatinine worsening requiring renal replacement therapy, HD initiated 4/18  cont CRRT due to circuit clotting increase  bfr 200 add heparin 300 unit/hr to circuit and cont  dfr 1400 fluid removal 175 cc/hr   see new orders   EP consulted for syncope device placement for bradycardia   structural heart team following, s/p TAVR  derm following for rash  pressor support  with vasopressin   d.w CCU team

## 2024-04-26 NOTE — PROGRESS NOTE ADULT - SUBJECTIVE AND OBJECTIVE BOX
PATIENT:  EVELYN LOPEZ  [id number]    CHIEF COMPLAINT:  Patient is a 73y old  Female who presents with a chief complaint of Syncope (2024 21:28)      INTERVAL HISTORY/OVERNIGHT EVENTS:      REVIEW OF SYSTEMS:    Constitutional:     [ ] negative [ ] fevers [ ] chills [ ] weight loss [ ] weight gain  HEENT:                  [ ] negative [ ] dry eyes [ ] eye irritation [ ] postnasal drip [ ] nasal congestion  CV:                         [ ] negative  [ ] chest pain [ ] orthopnea [ ] palpitations [ ] murmur  Resp:                     [ ] negative [ ] cough [ ] shortness of breath [ ] dyspnea [ ] wheezing [ ] sputum [ ] hemoptysis  GI:                          [ ] negative [ ] nausea [ ] vomiting [ ] diarrhea [ ] constipation [ ] abd pain [ ] dysphagia   :                        [ ] negative [ ] dysuria [ ] nocturia [ ] hematuria [ ] increased urinary frequency  Musculoskeletal: [ ] negative [ ] back pain [ ] myalgias [ ] arthralgias [ ] fracture  Skin:                       [ ] negative [ ] rash [ ] itch  Neurological:        [ ] negative [ ] headache [ ] dizziness [ ] syncope [ ] weakness [ ] numbness  Psychiatric:           [ ] negative [ ] anxiety [ ] depression  Endocrine:            [ ] negative [ ] diabetes [ ] thyroid problem  Heme/Lymph:      [ ] negative [ ] anemia [ ] bleeding problem  Allergic/Immune: [ ] negative [ ] itchy eyes [ ] nasal discharge [ ] hives [ ] angioedema    [ ] All other systems negative  [ ] Unable to assess ROS because ________.    MEDICATIONS:  MEDICATIONS  (STANDING):  ascorbic acid 500 milliGRAM(s) Oral daily  aspirin  chewable 81 milliGRAM(s) Oral daily  atorvastatin 80 milliGRAM(s) Oral at bedtime  calamine/zinc oxide Lotion 1 Application(s) Topical three times a day  chlorhexidine 2% Cloths 1 Application(s) Topical daily  CRRT Treatment    <Continuous>  ferrous    sulfate 325 milliGRAM(s) Oral two times a day  insulin glargine Injectable (LANTUS) 18 Unit(s) SubCutaneous at bedtime  insulin lispro (ADMELOG) corrective regimen sliding scale   SubCutaneous at bedtime  insulin lispro (ADMELOG) corrective regimen sliding scale   SubCutaneous three times a day before meals  insulin lispro Injectable (ADMELOG) 4 Unit(s) SubCutaneous before breakfast  insulin lispro Injectable (ADMELOG) 4 Unit(s) SubCutaneous before dinner  insulin lispro Injectable (ADMELOG) 4 Unit(s) SubCutaneous before lunch  levothyroxine 75 MICROGram(s) Oral daily  multivitamin 1 Tablet(s) Oral daily  nystatin    Suspension 242266 Unit(s) Oral four times a day  petrolatum white Ointment 1 Application(s) Topical three times a day  PrismaSATE Dialysate BGK 4 / 2.5 5000 milliLiter(s) (1400 mL/Hr) CRRT <Continuous>  PrismaSOL Filtration BGK 0 / 2.5 5000 milliLiter(s) (800 mL/Hr) CRRT <Continuous>  PrismaSOL Filtration BGK 4 / 2.5 5000 milliLiter(s) (600 mL/Hr) CRRT <Continuous>  senna 2 Tablet(s) Oral at bedtime  vasopressin Infusion 0.06 Unit(s)/Min (9 mL/Hr) IV Continuous <Continuous>    MEDICATIONS  (PRN):  polyethylene glycol 3350 17 Gram(s) Oral two times a day PRN Constipation      ALLERGIES:  Allergies    contrast media (iodine-based) (Fever; Vomiting; Flushing; Hypotension; Rash; Stomach Upset)  Ativan (Rash; Urticaria)  penicillins (Hives (Mod to Severe); Anaphylaxis (Mod to Severe); Short breath (Mod to Severe); Angioedema (Mod to Severe))    Intolerances        OBJECTIVE:  ICU Vital Signs Last 24 Hrs  T(C): 37.4 (2024 04:00), Max: 37.4 (2024 04:00)  T(F): 99.3 (2024 04:00), Max: 99.3 (2024 04:00)  HR: 71 (2024 06:45) (42 - 94)  BP: 138/63 (2024 10:19) (127/58 - 138/63)  BP(mean): 90 (2024 10:19) (83 - 90)  ABP: 99/30 (2024 06:45) (83/30 - 166/70)  ABP(mean): 53 (2024 06:45) (46 - 106)  RR: 18 (2024 06:45) (3 - 31)  SpO2: 100% (2024 06:45) (92% - 100%)    O2 Parameters below as of 2024 06:00  Patient On (Oxygen Delivery Method): nasal cannula  O2 Flow (L/min): 6        Mode: AC/ CMV (Assist Control/ Continuous Mandatory Ventilation)  RR (machine): 20  TV (machine): 600  FiO2: 40  PEEP: 8  ITime: 1  MAP: 14  PIP: 23    Adult Advanced Hemodynamics Last 24 Hrs  CVP(mm Hg): --  CVP(cm H2O): --  CO: --  CI: --  PA: --  PA(mean): --  PCWP: --  SVR: --  SVRI: --  PVR: --  PVRI: --  CAPILLARY BLOOD GLUCOSE      POCT Blood Glucose.: 225 mg/dL (2024 05:48)  POCT Blood Glucose.: 135 mg/dL (2024 02:04)  POCT Blood Glucose.: 137 mg/dL (2024 01:04)  POCT Blood Glucose.: 131 mg/dL (2024 00:05)  POCT Blood Glucose.: 141 mg/dL (2024 23:10)  POCT Blood Glucose.: 153 mg/dL (2024 22:00)  POCT Blood Glucose.: 159 mg/dL (2024 20:57)  POCT Blood Glucose.: 155 mg/dL (2024 20:07)  POCT Blood Glucose.: 136 mg/dL (2024 19:17)  POCT Blood Glucose.: 121 mg/dL (2024 18:09)  POCT Blood Glucose.: 125 mg/dL (2024 16:55)  POCT Blood Glucose.: 137 mg/dL (2024 16:08)  POCT Blood Glucose.: 154 mg/dL (2024 15:08)  POCT Blood Glucose.: 154 mg/dL (2024 14:06)  POCT Blood Glucose.: 189 mg/dL (2024 12:46)  POCT Blood Glucose.: 188 mg/dL (2024 11:20)  POCT Blood Glucose.: 208 mg/dL (2024 10:06)  POCT Blood Glucose.: 220 mg/dL (2024 09:02)  POCT Blood Glucose.: 237 mg/dL (2024 07:51)    CAPILLARY BLOOD GLUCOSE      POCT Blood Glucose.: 225 mg/dL (2024 05:48)    I&O's Summary    2024 07:01  -  2024 07:00  --------------------------------------------------------  IN: 1144.4 mL / OUT: 3207 mL / NET: -2062.6 mL      Daily Height in cm: 180.34 (2024 10:19)    Daily Weight in k.2 (2024 05:45)    PHYSICAL EXAMINATION:  General: WN/WD NAD  HEENT: PERRLA, EOMI, moist mucous membranes  Neurology: A&Ox3, nonfocal, PEREZ x 4  Respiratory: CTA B/L, normal respiratory effort, no wheezes, crackles, rales  CV: RRR, S1S2, no murmurs, rubs or gallops  Abdominal: Soft, NT, ND +BS, Last BM  Extremities: No edema, + peripheral pulses  Incisions:   Tubes:    LABS:  ABG - ( 2024 00:36 )  pH, Arterial: 7.35  pH, Blood: x     /  pCO2: 49    /  pO2: 114   / HCO3: 27    / Base Excess: 1.1   /  SaO2: 98.8                                    8.9    17.42 )-----------( 107      ( 2024 00:44 )             28.6     -    135  |  100  |  31<H>  ----------------------------<  132<H>  3.9   |  24  |  2.25<H>    Ca    7.7<L>      2024 00:44  Phos  2.8       Mg     2.2         TPro  5.6<L>  /  Alb  2.8<L>  /  TBili  0.6  /  DBili  x   /  AST  19  /  ALT  8<L>  /  AlkPhos  56      LIVER FUNCTIONS - ( 2024 00:44 )  Alb: 2.8 g/dL / Pro: 5.6 g/dL / ALK PHOS: 56 U/L / ALT: 8 U/L / AST: 19 U/L / GGT: x           PT/INR - ( 2024 08:40 )   PT: 15.6 sec;   INR: 1.43 ratio         PTT - ( 2024 08:40 )  PTT:25.6 sec        Urinalysis Basic - ( 2024 00:44 )    Color: x / Appearance: x / SG: x / pH: x  Gluc: 132 mg/dL / Ketone: x  / Bili: x / Urobili: x   Blood: x / Protein: x / Nitrite: x   Leuk Esterase: x / RBC: x / WBC x   Sq Epi: x / Non Sq Epi: x / Bacteria: x        TELEMETRY:     EK Lead ECG:   Ventricular Rate 111 BPM    QRS Duration 166 ms    Q-T Interval 390 ms    QTC Calculation(Bazett) 530 ms    R Axis -70 degrees    T Axis 116 degrees    Diagnosis Line PROBABLY SINUS TACHYCARDIA  LEFT AXIS DEVIATION  LEFT BUNDLE BRANCH BLOCK  ABNORMAL ECG  WHEN COMPARED WITH ECG OF 2024 06:37, (UNCONFIRMED)  SIGNIFICANT CHANGES HAVE OCCURRED  Confirmed by MD MARY KAY, LOREE ([id number]) on 2024 11:33:05 AM (24 @ 03:19)      IMAGING:  Echo:        LVSF:        EF:        RVSF:        LA:        RA:        Mitral Valve:        Aortic Valve:       Tricuspid Valve:        Pulmonic Valve:        Pericardium:     ASSESSMENT & PLAN:       PATIENT:  EVELYN LOPEZ  [id number]    CHIEF COMPLAINT:  Patient is a 73y old  Female who presents with a chief complaint of Syncope (2024 21:28)      INTERVAL HISTORY/OVERNIGHT EVENTS:  s/p Micra, extubated overnight. Patient reports feeling fatigued this morning. Does not report any pain, lightheadedness, or nausea.       REVIEW OF SYSTEMS:    Constitutional:     [ ] negative [ ] fevers [ ] chills [ ] weight loss [ ] weight gain  HEENT:                  [ ] negative [ ] dry eyes [ ] eye irritation [ ] postnasal drip [ ] nasal congestion  CV:                         [ ] negative  [ ] chest pain [ ] orthopnea [ ] palpitations [ ] murmur  Resp:                     [ ] negative [ ] cough [ ] shortness of breath [ ] dyspnea [ ] wheezing [ ] sputum [ ] hemoptysis  GI:                          [ ] negative [ ] nausea [ ] vomiting [ ] diarrhea [ ] constipation [ ] abd pain [ ] dysphagia   :                        [ ] negative [ ] dysuria [ ] nocturia [ ] hematuria [ ] increased urinary frequency  Musculoskeletal: [ ] negative [ ] back pain [ ] myalgias [ ] arthralgias [ ] fracture  Skin:                       [ ] negative [ ] rash [ ] itch  Neurological:        [ ] negative [ ] headache [ ] dizziness [ ] syncope [ ] weakness [ ] numbness  Psychiatric:           [ ] negative [ ] anxiety [ ] depression  Endocrine:            [ ] negative [ ] diabetes [ ] thyroid problem  Heme/Lymph:      [ ] negative [ ] anemia [ ] bleeding problem  Allergic/Immune: [ ] negative [ ] itchy eyes [ ] nasal discharge [ ] hives [ ] angioedema    [x] All other systems negative  [ ] Unable to assess ROS because ________.    MEDICATIONS:  MEDICATIONS  (STANDING):  ascorbic acid 500 milliGRAM(s) Oral daily  aspirin  chewable 81 milliGRAM(s) Oral daily  atorvastatin 80 milliGRAM(s) Oral at bedtime  calamine/zinc oxide Lotion 1 Application(s) Topical three times a day  chlorhexidine 2% Cloths 1 Application(s) Topical daily  CRRT Treatment    <Continuous>  ferrous    sulfate 325 milliGRAM(s) Oral two times a day  insulin glargine Injectable (LANTUS) 18 Unit(s) SubCutaneous at bedtime  insulin lispro (ADMELOG) corrective regimen sliding scale   SubCutaneous at bedtime  insulin lispro (ADMELOG) corrective regimen sliding scale   SubCutaneous three times a day before meals  insulin lispro Injectable (ADMELOG) 4 Unit(s) SubCutaneous before breakfast  insulin lispro Injectable (ADMELOG) 4 Unit(s) SubCutaneous before dinner  insulin lispro Injectable (ADMELOG) 4 Unit(s) SubCutaneous before lunch  levothyroxine 75 MICROGram(s) Oral daily  multivitamin 1 Tablet(s) Oral daily  nystatin    Suspension 669209 Unit(s) Oral four times a day  petrolatum white Ointment 1 Application(s) Topical three times a day  PrismaSATE Dialysate BGK 4 / 2.5 5000 milliLiter(s) (1400 mL/Hr) CRRT <Continuous>  PrismaSOL Filtration BGK 0 / 2.5 5000 milliLiter(s) (800 mL/Hr) CRRT <Continuous>  PrismaSOL Filtration BGK 4 / 2.5 5000 milliLiter(s) (600 mL/Hr) CRRT <Continuous>  senna 2 Tablet(s) Oral at bedtime  vasopressin Infusion 0.06 Unit(s)/Min (9 mL/Hr) IV Continuous <Continuous>    MEDICATIONS  (PRN):  polyethylene glycol 3350 17 Gram(s) Oral two times a day PRN Constipation      ALLERGIES:  Allergies    contrast media (iodine-based) (Fever; Vomiting; Flushing; Hypotension; Rash; Stomach Upset)  Ativan (Rash; Urticaria)  penicillins (Hives (Mod to Severe); Anaphylaxis (Mod to Severe); Short breath (Mod to Severe); Angioedema (Mod to Severe))    Intolerances        OBJECTIVE:  ICU Vital Signs Last 24 Hrs  T(C): 37.4 (2024 04:00), Max: 37.4 (2024 04:00)  T(F): 99.3 (2024 04:00), Max: 99.3 (2024 04:00)  HR: 71 (2024 06:45) (42 - 94)  BP: 138/63 (2024 10:19) (127/58 - 138/63)  BP(mean): 90 (2024 10:19) (83 - 90)  ABP: 99/30 (2024 06:45) (83/30 - 166/70)  ABP(mean): 53 (2024 06:45) (46 - 106)  RR: 18 (2024 06:45) (3 - 31)  SpO2: 100% (2024 06:45) (92% - 100%)    O2 Parameters below as of 2024 06:00  Patient On (Oxygen Delivery Method): nasal cannula  O2 Flow (L/min): 6        Mode: AC/ CMV (Assist Control/ Continuous Mandatory Ventilation)  RR (machine): 20  TV (machine): 600  FiO2: 40  PEEP: 8  ITime: 1  MAP: 14  PIP: 23    Adult Advanced Hemodynamics Last 24 Hrs  CVP(mm Hg): --  CVP(cm H2O): --  CO: --  CI: --  PA: --  PA(mean): --  PCWP: --  SVR: --  SVRI: --  PVR: --  PVRI: --  CAPILLARY BLOOD GLUCOSE      POCT Blood Glucose.: 225 mg/dL (2024 05:48)  POCT Blood Glucose.: 135 mg/dL (2024 02:04)  POCT Blood Glucose.: 137 mg/dL (2024 01:04)  POCT Blood Glucose.: 131 mg/dL (2024 00:05)  POCT Blood Glucose.: 141 mg/dL (2024 23:10)  POCT Blood Glucose.: 153 mg/dL (2024 22:00)  POCT Blood Glucose.: 159 mg/dL (2024 20:57)  POCT Blood Glucose.: 155 mg/dL (2024 20:07)  POCT Blood Glucose.: 136 mg/dL (2024 19:17)  POCT Blood Glucose.: 121 mg/dL (2024 18:09)  POCT Blood Glucose.: 125 mg/dL (2024 16:55)  POCT Blood Glucose.: 137 mg/dL (2024 16:08)  POCT Blood Glucose.: 154 mg/dL (2024 15:08)  POCT Blood Glucose.: 154 mg/dL (2024 14:06)  POCT Blood Glucose.: 189 mg/dL (2024 12:46)  POCT Blood Glucose.: 188 mg/dL (2024 11:20)  POCT Blood Glucose.: 208 mg/dL (2024 10:06)  POCT Blood Glucose.: 220 mg/dL (2024 09:02)  POCT Blood Glucose.: 237 mg/dL (2024 07:51)    CAPILLARY BLOOD GLUCOSE      POCT Blood Glucose.: 225 mg/dL (2024 05:48)    I&O's Summary    2024 07:01  -  2024 07:00  --------------------------------------------------------  IN: 1144.4 mL / OUT: 3207 mL / NET: -2062.6 mL      Daily Height in cm: 180.34 (2024 10:19)    Daily Weight in k.2 (2024 05:45)    PHYSICAL EXAMINATION:  General: WN/WD NAD  HEENT: PERRLA, EOMI, moist mucous membranes  Neurology: A&Ox3, nonfocal, PEREZ x 4  Respiratory: CTA B/L, normal respiratory effort, no wheezes, crackles, rales  CV: RRR, S1S2, no murmurs, rubs or gallops  Abdominal: Soft, NT, ND +BS, Last BM  Extremities: No edema, + peripheral pulses    LABS:  ABG - ( 2024 00:36 )  pH, Arterial: 7.35  pH, Blood: x     /  pCO2: 49    /  pO2: 114   / HCO3: 27    / Base Excess: 1.1   /  SaO2: 98.8                   8.9    17.42 )-----------( 107      ( 2024 00:44 )             28.6         135  |  100  |  31<H>  ----------------------------<  132<H>  3.9   |  24  |  2.25<H>    Ca    7.7<L>      2024 00:44  Phos  2.8       Mg     2.2         TPro  5.6<L>  /  Alb  2.8<L>  /  TBili  0.6  /  DBili  x   /  AST  19  /  ALT  8<L>  /  AlkPhos  56  04-26    LIVER FUNCTIONS - ( 2024 00:44 )  Alb: 2.8 g/dL / Pro: 5.6 g/dL / ALK PHOS: 56 U/L / ALT: 8 U/L / AST: 19 U/L / GGT: x           PT/INR - ( 2024 08:40 )   PT: 15.6 sec;   INR: 1.43 ratio         PTT - ( 2024 08:40 )  PTT:25.6 sec        Urinalysis Basic - ( 2024 00:44 )    Color: x / Appearance: x / SG: x / pH: x  Gluc: 132 mg/dL / Ketone: x  / Bili: x / Urobili: x   Blood: x / Protein: x / Nitrite: x   Leuk Esterase: x / RBC: x / WBC x   Sq Epi: x / Non Sq Epi: x / Bacteria: x        TELEMETRY:     EK Lead ECG:   Ventricular Rate 111 BPM    QRS Duration 166 ms    Q-T Interval 390 ms    QTC Calculation(Bazett) 530 ms    R Axis -70 degrees    T Axis 116 degrees    Diagnosis Line PROBABLY SINUS TACHYCARDIA  LEFT AXIS DEVIATION  LEFT BUNDLE BRANCH BLOCK  ABNORMAL ECG  WHEN COMPARED WITH ECG OF 2024 06:37, (UNCONFIRMED)  SIGNIFICANT CHANGES HAVE OCCURRED  Confirmed by MD MARY KAY, LOREE ([id number]) on 2024 11:33:05 AM (24 @ 03:19)

## 2024-04-26 NOTE — PROGRESS NOTE ADULT - CRITICAL CARE ATTENDING COMMENT
Upon my evaluation, this patient is at high risk for imminent or life threatening deterioration due to shock, resp failure and other active medical issues which require my direct attention, intervention, and personal management.  I have personally spent >30 minutes  of critical care time exclusive of time spent on separate billing procedures. This includes review of laboratory data, radiology results, discussion with primary team\patient, and monitoring for potential decompensation. Interventions were performed as documented above.

## 2024-04-26 NOTE — PROGRESS NOTE ADULT - SUBJECTIVE AND OBJECTIVE BOX
Admitted for: Syncope and collapse        Following for: L adrenal nodule    Subjective:   Patient was extubated  She is eating, reports appetite is "so-so"      MEDICATIONS  (STANDING):  ascorbic acid 500 milliGRAM(s) Oral daily  aspirin  chewable 81 milliGRAM(s) Oral daily  atorvastatin 80 milliGRAM(s) Oral at bedtime  calamine/zinc oxide Lotion 1 Application(s) Topical three times a day  chlorhexidine 2% Cloths 1 Application(s) Topical daily  CRRT Treatment    <Continuous>  ferrous    sulfate 325 milliGRAM(s) Oral two times a day  heparin   Injectable 5000 Unit(s) SubCutaneous every 8 hours  heparin  Infusion Syringe 300 Unit(s)/Hr (0.6 mL/Hr) CRRT <Continuous>  insulin glargine Injectable (LANTUS) 24 Unit(s) SubCutaneous at bedtime  insulin lispro (ADMELOG) corrective regimen sliding scale   SubCutaneous at bedtime  insulin lispro (ADMELOG) corrective regimen sliding scale   SubCutaneous three times a day before meals  insulin lispro Injectable (ADMELOG) 8 Unit(s) SubCutaneous three times a day before meals  levothyroxine 75 MICROGram(s) Oral daily  multivitamin 1 Tablet(s) Oral daily  nystatin    Suspension 524361 Unit(s) Oral four times a day  petrolatum white Ointment 1 Application(s) Topical three times a day  PrismaSATE Dialysate BGK 4 / 2.5 5000 milliLiter(s) (1400 mL/Hr) CRRT <Continuous>  PrismaSOL Filtration BGK 0 / 2.5 5000 milliLiter(s) (1000 mL/Hr) CRRT <Continuous>  PrismaSOL Filtration BGK 4 / 2.5 5000 milliLiter(s) (300 mL/Hr) CRRT <Continuous>  senna 2 Tablet(s) Oral at bedtime  vancomycin  IVPB 1000 milliGRAM(s) IV Intermittent every 12 hours  vasopressin Infusion 0.06 Unit(s)/Min (9 mL/Hr) IV Continuous <Continuous>    MEDICATIONS  (PRN):  polyethylene glycol 3350 17 Gram(s) Oral two times a day PRN Constipation      Allergies    contrast media (iodine-based) (Fever; Vomiting; Flushing; Hypotension; Rash; Stomach Upset)  Ativan (Rash; Urticaria)  penicillins (Hives (Mod to Severe); Anaphylaxis (Mod to Severe); Short breath (Mod to Severe); Angioedema (Mod to Severe))    Intolerances          PHYSICAL EXAM:  VITALS: T(C): 37 (04-26-24 @ 12:00)  T(F): 98.6 (04-26-24 @ 12:00), Max: 99.3 (04-26-24 @ 04:00)  HR: 71 (04-26-24 @ 13:00) (53 - 97)  BP: --  RR:  (5 - 31)  SpO2:  (94% - 100%)  Wt(kg): --  GENERAL: NAD, obese  EYES: No proptosis, no lid lag, anicteric  RESPIRATORY: On NC. no distress  CARDIOVASCULAR: +edema  GI: non distended  EXT: PEREZ  PSYCH: Alert and oriented x 3, reactive affect      A1C with Estimated Average Glucose Result: 7.4 % (03-30-24 @ 08:21)  A1C with Estimated Average Glucose Result: 6.8 % (01-10-24 @ 08:37)  A1C with Estimated Average Glucose Result: 7.3 % (08-12-23 @ 07:58)  A1C with Estimated Average Glucose Result: 8.2 % (06-05-23 @ 06:10)      POCT Blood Glucose.: 239 mg/dL (04-26-24 @ 11:51)  POCT Blood Glucose.: 228 mg/dL (04-26-24 @ 07:39)  POCT Blood Glucose.: 225 mg/dL (04-26-24 @ 05:48)  POCT Blood Glucose.: 135 mg/dL (04-26-24 @ 02:04)  POCT Blood Glucose.: 137 mg/dL (04-26-24 @ 01:04)  POCT Blood Glucose.: 131 mg/dL (04-26-24 @ 00:05)  POCT Blood Glucose.: 141 mg/dL (04-25-24 @ 23:10)  POCT Blood Glucose.: 153 mg/dL (04-25-24 @ 22:00)  POCT Blood Glucose.: 159 mg/dL (04-25-24 @ 20:57)  POCT Blood Glucose.: 155 mg/dL (04-25-24 @ 20:07)  POCT Blood Glucose.: 136 mg/dL (04-25-24 @ 19:17)  POCT Blood Glucose.: 121 mg/dL (04-25-24 @ 18:09)  POCT Blood Glucose.: 125 mg/dL (04-25-24 @ 16:55)  POCT Blood Glucose.: 137 mg/dL (04-25-24 @ 16:08)  POCT Blood Glucose.: 154 mg/dL (04-25-24 @ 15:08)  POCT Blood Glucose.: 154 mg/dL (04-25-24 @ 14:06)  POCT Blood Glucose.: 189 mg/dL (04-25-24 @ 12:46)  POCT Blood Glucose.: 188 mg/dL (04-25-24 @ 11:20)  POCT Blood Glucose.: 208 mg/dL (04-25-24 @ 10:06)  POCT Blood Glucose.: 220 mg/dL (04-25-24 @ 09:02)  POCT Blood Glucose.: 237 mg/dL (04-25-24 @ 07:51)  POCT Blood Glucose.: 271 mg/dL (04-25-24 @ 05:23)  POCT Blood Glucose.: 237 mg/dL (04-24-24 @ 22:30)  POCT Blood Glucose.: 220 mg/dL (04-24-24 @ 07:24)  POCT Blood Glucose.: 87 mg/dL (04-23-24 @ 23:59)  POCT Blood Glucose.: 92 mg/dL (04-23-24 @ 21:41)  POCT Blood Glucose.: 85 mg/dL (04-23-24 @ 18:05)      04-26    135  |  100  |  31<H>  ----------------------------<  132<H>  3.9   |  24  |  2.25<H>    eGFR: 23<L>    Ca    7.7<L>      04-26  Mg     2.2     04-26  Phos  2.8     04-26    TPro  5.6<L>  /  Alb  2.8<L>  /  TBili  0.6  /  DBili  x   /  AST  19  /  ALT  8<L>  /  AlkPhos  56  04-26      Thyroid Function Tests:  04-14 @ 07:18 TSH 5.06 FreeT4 1.3 T3 -- Anti TPO -- Anti Thyroglobulin Ab -- TSI --  04-13 @ 07:05 TSH 4.79 FreeT4 1.5 T3 -- Anti TPO -- Anti Thyroglobulin Ab -- TSI --

## 2024-04-26 NOTE — PROGRESS NOTE ADULT - ASSESSMENT
Assessment:  72 y/o female w/ PMH: HFrEF (EF 30%), AS, LBBB, HTN, IDDM1, CKD, hypothyroidism, chronic lymphedema, suspected OHS/LELIA (3L NC home O2) p/w 1 episode of syncope most likely 2/2 moderate to severe AS, admitted to floors for TAVR w/u. Course complicated by suspected contrast induced allergic reaction (had CTA for TAVR eval) and contrast related renal failure (acute on chronic) requiring HD. Pt. went for TAVR on 2/24 which was c/b VT and vfib requiring shock and flash pulmonary edema causing AHRF requiring intubation. Currently undergoing HD for hyperkalemia and fluid overload. Currently on pressor support for possible vasoplegic shock, all of which require management in CICU.    NEURO:  - Extubated 4/26  - A&Ox3 at baseline  -     PULM  # Acute hypoxic respiratory failure most likely 2/2 flash pulmonary edema  # OHS/LELIA (on home oxygen)  - currently on 6L NC saturating well  - monitor status    CV  # VT/vfib s/p shock  # Known LBBB  - Monitor on tele  - Trend lytes    # 1st degree AVB w/ PACs  # Severe AS s/p TAVR  - s/p Micra 4/25, no longer requiring TVP (eventual candidate for CRT per EP?)  - c/w ASA  - c/w atorvastatin     # Vasolplegia most likely 2/2 sedation   - c/w vaso, titrate to MAP >=65  - Trend lactate    # HFrEF  - EF 25%  - Holding GDMT while on pressors    /RENAL  # DUNCAN on CKD requiring HD c/b hyperkalemia  - CRRT  - Strict I/Os  - Trend BMP, lytes  - Avoid nephrotoxins    GI  - resumed diet    ENDO  # IDDM1  # DKA  - s/p Insulin drip  - transitioned to basal/bolus    # Hypothyroidism  - c/w home levothyroxine     SKIN  # Acute generalized exanthematous pustulosis due to drug  - c/w calamine  - c/w vaseline    # Psoriasis   # Lymphedema   - Elevate legs  - Compression stockings, ACE wrapping    ID  - Monitor WBC and for fevers    # Oral Candidiasis  - c/w nystatin     Patient requires continuous monitoring with bedside rhythm monitoring, pulse ox monitoring, and intermittent blood gas analysis. Care plan discussed with ICU care team. Patient remained critical and at risk for life threatening decompensation.  Patient seen, examined and plan discussed with CCU team during rounds.    Assessment:  72 y/o female w/ PMH: HFrEF (EF 30%), AS, LBBB, HTN, IDDM1, CKD, hypothyroidism, chronic lymphedema, suspected OHS/LELIA (3L NC home O2) p/w 1 episode of syncope most likely 2/2 moderate to severe AS, admitted to floors for TAVR w/u. Course complicated by suspected contrast induced allergic reaction (had CTA for TAVR eval) and contrast related renal failure (acute on chronic) requiring HD. Pt. went for TAVR on 2/24 which was c/b VT and vfib requiring shock and flash pulmonary edema causing AHRF requiring intubation. Currently undergoing HD for hyperkalemia and fluid overload. Currently on pressor support for possible vasoplegic shock, all of which require management in CICU.    NEURO:  - Extubated 4/26  - Currently AOx4    PULM  # Acute hypoxic respiratory failure most likely 2/2 flash pulmonary edema  # OHS/LELIA (on home oxygen)  - currently on 3L NC saturating well  - monitor status    CV  #Shock state  - initially suspected vasoplegia from sedation, now with persistent pressor requirement is there component of sepsis or hypovolemia from CRRT? less likely obstructive or cardiogenic  - currently on vasopressin 0.09  - lactate cleared  - will start empiric antibiotics with vancomycin for now to address possible underlying infection    #VT/vfib s/p shock  #Known LBBB with first degree AV block  - Monitor on tele  - Trend lytes    #2:1 AV block with 1st degree AV block  #Severe AS s/p TAVR  - s/p Micra 4/25, no longer requiring TVP (eventual candidate for CRT per EP?)  - c/w ASA  - c/w atorvastatin     # HFrEF  - EF 25%  - Holding GDMT while on pressors    /RENAL  # DUNCAN on CKD requiring HD c/b hyperkalemia  - CRRT  - Strict I/Os  - Trend BMP, lytes  - Avoid nephrotoxins    GI  - resumed diet    ENDO  # IDDM1  # DKA  - s/p Insulin drip  - transitioned to basal/bolus    # Hypothyroidism  - c/w home levothyroxine     SKIN  # Acute generalized exanthematous pustulosis due to drug  - c/w calamine  - c/w vaseline    # Psoriasis   # Lymphedema   - Elevate legs  - Compression stockings, ACE wrapping    ID  - Monitor WBC and for fevers    # Oral Candidiasis  - c/w nystatin     Mobilize out of bed as soon as possible. Perhaps once off CRRT and pressors.     Patient requires continuous monitoring with bedside rhythm monitoring, pulse ox monitoring, and intermittent blood gas analysis. Care plan discussed with ICU care team. Patient remained critical and at risk for life threatening decompensation.  Patient seen, examined and plan discussed with CCU team during rounds.

## 2024-04-26 NOTE — PROGRESS NOTE ADULT - SUBJECTIVE AND OBJECTIVE BOX
[facility]-- WOUND TEAM -- FOLLOW UP NOTE  --------------------------------------------------------------------------------    24 hour events/subjective:          Diet:  Diet, Consistent Carbohydrate w/Evening Snack (04-26-24 @ 06:08)      ROS: General/ SKIN/ MSK/ Vasc see HPI  all other systems negative      ALLERGIES & MEDICATIONS  --------------------------------------------------------------------------------  Allergies  contrast media (iodine-based) (Fever; Vomiting; Flushing; Hypotension; Rash; Stomach Upset)  Ativan (Rash; Urticaria)  penicillins (Hives (Mod to Severe); Anaphylaxis (Mod to Severe); Short breath (Mod to Severe); Angioedema (Mod to Severe))        STANDING INPATIENT MEDICATIONS  ascorbic acid 500 milliGRAM(s) Oral daily  aspirin  chewable 81 milliGRAM(s) Oral daily  atorvastatin 80 milliGRAM(s) Oral at bedtime  calamine/zinc oxide Lotion 1 Application(s) Topical three times a day  chlorhexidine 2% Cloths 1 Application(s) Topical daily  CRRT Treatment    <Continuous>  ferrous    sulfate 325 milliGRAM(s) Oral two times a day  heparin   Injectable 5000 Unit(s) SubCutaneous every 8 hours  heparin  Infusion Syringe 300 Unit(s)/Hr CRRT <Continuous>  insulin glargine Injectable (LANTUS) 24 Unit(s) SubCutaneous at bedtime  insulin lispro (ADMELOG) corrective regimen sliding scale   SubCutaneous at bedtime  insulin lispro (ADMELOG) corrective regimen sliding scale   SubCutaneous three times a day before meals  insulin lispro Injectable (ADMELOG) 4 Unit(s) SubCutaneous before lunch  insulin lispro Injectable (ADMELOG) 4 Unit(s) SubCutaneous before breakfast  insulin lispro Injectable (ADMELOG) 4 Unit(s) SubCutaneous before dinner  levothyroxine 75 MICROGram(s) Oral daily  multivitamin 1 Tablet(s) Oral daily  nystatin    Suspension 299711 Unit(s) Oral four times a day  petrolatum white Ointment 1 Application(s) Topical three times a day  PrismaSATE Dialysate BGK 4 / 2.5 5000 milliLiter(s) CRRT <Continuous>  PrismaSOL Filtration BGK 0 / 2.5 5000 milliLiter(s) CRRT <Continuous>  PrismaSOL Filtration BGK 4 / 2.5 5000 milliLiter(s) CRRT <Continuous>  senna 2 Tablet(s) Oral at bedtime  vancomycin  IVPB 1000 milliGRAM(s) IV Intermittent every 12 hours  vasopressin Infusion 0.06 Unit(s)/Min IV Continuous <Continuous>      PRN INPATIENT MEDICATION  polyethylene glycol 3350 17 Gram(s) Oral two times a day PRN        VITALS/PHYSICAL EXAM  --------------------------------------------------------------------------------  T(C): 37.3 (04-26-24 @ 08:00), Max: 37.4 (04-26-24 @ 04:00)  HR: 73 (04-26-24 @ 10:00) (43 - 97)  BP: --  RR: 16 (04-26-24 @ 10:00) (6 - 31)  SpO2: 97% (04-26-24 @ 10:00) (92% - 100%)  Wt(kg): --  Height (cm): 180.3 (04-25-24 @ 10:19)  Weight (kg): 149.7 (04-25-24 @ 10:19)  BMI (kg/m2): 46.1 (04-25-24 @ 10:19)  BSA (m2): 2.61 (04-25-24 @ 10:19)                  LABS/ CULTURES/ RADIOLOGY:              8.9    17.42 >-----------<  107      [04-26-24 @ 00:44]              28.6     135  |  100  |  31  ----------------------------<  132      [04-26-24 @ 00:44]  3.9   |  24  |  2.25        Ca     7.7     [04-26-24 @ 00:44]      Mg     2.2     [04-26-24 @ 00:44]      Phos  2.8     [04-26-24 @ 00:44]    TPro  5.6  /  Alb  2.8  /  TBili  0.6  /  DBili  x   /  AST  19  /  ALT  8   /  AlkPhos  56  [04-26-24 @ 00:44]    PT/INR: PT 15.6 , INR 1.43       [04-25-24 @ 08:40]  PTT: 25.6       [04-25-24 @ 08:40]        CAPILLARY BLOOD GLUCOSE  POCT Blood Glucose.: 228 mg/dL (26 Apr 2024 07:39)  POCT Blood Glucose.: 225 mg/dL (26 Apr 2024 05:48)  POCT Blood Glucose.: 135 mg/dL (26 Apr 2024 02:04)  POCT Blood Glucose.: 137 mg/dL (26 Apr 2024 01:04)  POCT Blood Glucose.: 131 mg/dL (26 Apr 2024 00:05)  POCT Blood Glucose.: 141 mg/dL (25 Apr 2024 23:10)  POCT Blood Glucose.: 153 mg/dL (25 Apr 2024 22:00)  POCT Blood Glucose.: 159 mg/dL (25 Apr 2024 20:57)  POCT Blood Glucose.: 155 mg/dL (25 Apr 2024 20:07)  POCT Blood Glucose.: 136 mg/dL (25 Apr 2024 19:17)  POCT Blood Glucose.: 121 mg/dL (25 Apr 2024 18:09)  POCT Blood Glucose.: 125 mg/dL (25 Apr 2024 16:55)  POCT Blood Glucose.: 137 mg/dL (25 Apr 2024 16:08)  POCT Blood Glucose.: 154 mg/dL (25 Apr 2024 15:08)  POCT Blood Glucose.: 154 mg/dL (25 Apr 2024 14:06)  POCT Blood Glucose.: 189 mg/dL (25 Apr 2024 12:46)        Triglycerides, Serum: 70 mg/dL (04-13-24 @ 07:05)    Intact PTH: 109 pg/mL (04-19-24 @ 17:54)              A1C with Estimated Average Glucose Result: 7.4 % (03-30-24 @ 08:21)  A1C with Estimated Average Glucose Result: 6.8 % (01-10-24 @ 08:37)   [facility]-- WOUND TEAM -- FOLLOW UP NOTE  --------------------------------------------------------------------------------    24 hour events/subjective:      Afebrile  Tolerating po w/o n/v  tolerating multilayer wrap     legs w/ less swelling & less heavy  pt now on pressors and CVVHD in CICU  pt had reaction to IV Conrast      Diet:  Diet, Consistent Carbohydrate w/Evening Snack (04-26-24 @ 06:08)      ROS: General/ SKIN/ MSK/ Vasc see HPI  all other systems negative      ALLERGIES & MEDICATIONS  --------------------------------------------------------------------------------  Allergies  contrast media (iodine-based) (Fever; Vomiting; Flushing; Hypotension; Rash; Stomach Upset)  Ativan (Rash; Urticaria)  penicillins (Hives (Mod to Severe); Anaphylaxis (Mod to Severe); Short breath (Mod to Severe); Angioedema (Mod to Severe))        STANDING INPATIENT MEDICATIONS  ascorbic acid 500 milliGRAM(s) Oral daily  aspirin  chewable 81 milliGRAM(s) Oral daily  atorvastatin 80 milliGRAM(s) Oral at bedtime  calamine/zinc oxide Lotion 1 Application(s) Topical three times a day  chlorhexidine 2% Cloths 1 Application(s) Topical daily  CRRT Treatment    <Continuous>  ferrous    sulfate 325 milliGRAM(s) Oral two times a day  heparin   Injectable 5000 Unit(s) SubCutaneous every 8 hours  heparin  Infusion Syringe 300 Unit(s)/Hr CRRT <Continuous>  insulin glargine Injectable (LANTUS) 24 Unit(s) SubCutaneous at bedtime  insulin lispro (ADMELOG) corrective regimen sliding scale   SubCutaneous at bedtime  insulin lispro (ADMELOG) corrective regimen sliding scale   SubCutaneous three times a day before meals  insulin lispro Injectable (ADMELOG) 4 Unit(s) SubCutaneous before lunch  insulin lispro Injectable (ADMELOG) 4 Unit(s) SubCutaneous before breakfast  insulin lispro Injectable (ADMELOG) 4 Unit(s) SubCutaneous before dinner  levothyroxine 75 MICROGram(s) Oral daily  multivitamin 1 Tablet(s) Oral daily  nystatin    Suspension 299225 Unit(s) Oral four times a day  petrolatum white Ointment 1 Application(s) Topical three times a day  PrismaSATE Dialysate BGK 4 / 2.5 5000 milliLiter(s) CRRT <Continuous>  PrismaSOL Filtration BGK 0 / 2.5 5000 milliLiter(s) CRRT <Continuous>  PrismaSOL Filtration BGK 4 / 2.5 5000 milliLiter(s) CRRT <Continuous>  senna 2 Tablet(s) Oral at bedtime  vancomycin  IVPB 1000 milliGRAM(s) IV Intermittent every 12 hours  vasopressin Infusion 0.06 Unit(s)/Min IV Continuous <Continuous>      PRN INPATIENT MEDICATION  polyethylene glycol 3350 17 Gram(s) Oral two times a day PRN        VITALS/PHYSICAL EXAM  --------------------------------------------------------------------------------  T(C): 37.3 (04-26-24 @ 08:00), Max: 37.4 (04-26-24 @ 04:00)  HR: 73 (04-26-24 @ 10:00) (43 - 97)  BP: --  RR: 16 (04-26-24 @ 10:00) (6 - 31)  SpO2: 97% (04-26-24 @ 10:00) (92% - 100%)  Wt(kg): --  Height (cm): 180.3 (04-25-24 @ 10:19)  Weight (kg): 149.7 (04-25-24 @ 10:19)  BMI (kg/m2): 46.1 (04-25-24 @ 10:19)  BSA (m2): 2.61 (04-25-24 @ 10:19)    guarded but stable,  A&Ox3, MO  WD/ WN/ WG,    Progressa bed  HEENT:  NC/AT, EOMI, sclera clear, mucosa moist, throat clear, trachea midline, neck supple  Respiratory: nonlabored w/ equal chest rise  Gastrointestinal: soft NT/ND   Neurology: strength & sensation grossly intact,   Psych: calm/ appropriate  Musculoskeletal: FROM, no deformities/ contractures  Vascular: BLE equally warm,  no cyanosis, clubbing, nor acute ischemia       BLE edema equal       BLE DP pulses palpable       BLE hemosiderin staining & lymphedema improving           soft plaques easily lifted w/ mechanical debridement       w/ open skin & blistering       and scant drainage      Rt plantar foot w/ dark blister around callous  No odor, erythema, increased warmth, tenderness, induration, fluctuance, nor crepitus  Skin: dry peeling flakey pale,  scattered psoriatic plaques throughout extremities and torso  bilateral buttock w/ faint erythema  w/o blistering  or  drainage  No odor, erythema, increased warmth, tenderness, induration, fluctuance, nor crepitus      LABS/ CULTURES/ RADIOLOGY:              8.9    17.42 >-----------<  107      [04-26-24 @ 00:44]              28.6     135  |  100  |  31  ----------------------------<  132      [04-26-24 @ 00:44]  3.9   |  24  |  2.25        Ca     7.7     [04-26-24 @ 00:44]      Mg     2.2     [04-26-24 @ 00:44]      Phos  2.8     [04-26-24 @ 00:44]    TPro  5.6  /  Alb  2.8  /  TBili  0.6  /  DBili  x   /  AST  19  /  ALT  8   /  AlkPhos  56  [04-26-24 @ 00:44]    PT/INR: PT 15.6 , INR 1.43       [04-25-24 @ 08:40]  PTT: 25.6       [04-25-24 @ 08:40]        CAPILLARY BLOOD GLUCOSE  POCT Blood Glucose.: 228 mg/dL (26 Apr 2024 07:39)  POCT Blood Glucose.: 225 mg/dL (26 Apr 2024 05:48)  POCT Blood Glucose.: 135 mg/dL (26 Apr 2024 02:04)  POCT Blood Glucose.: 137 mg/dL (26 Apr 2024 01:04)  POCT Blood Glucose.: 131 mg/dL (26 Apr 2024 00:05)  POCT Blood Glucose.: 141 mg/dL (25 Apr 2024 23:10)  POCT Blood Glucose.: 153 mg/dL (25 Apr 2024 22:00)  POCT Blood Glucose.: 159 mg/dL (25 Apr 2024 20:57)  POCT Blood Glucose.: 155 mg/dL (25 Apr 2024 20:07)  POCT Blood Glucose.: 136 mg/dL (25 Apr 2024 19:17)  POCT Blood Glucose.: 121 mg/dL (25 Apr 2024 18:09)  POCT Blood Glucose.: 125 mg/dL (25 Apr 2024 16:55)  POCT Blood Glucose.: 137 mg/dL (25 Apr 2024 16:08)  POCT Blood Glucose.: 154 mg/dL (25 Apr 2024 15:08)  POCT Blood Glucose.: 154 mg/dL (25 Apr 2024 14:06)  POCT Blood Glucose.: 189 mg/dL (25 Apr 2024 12:46)        A1C with Estimated Average Glucose Result: 7.4 % (03-30-24 @ 08:21)  A1C with Estimated Average Glucose Result: 6.8 % (01-10-24 @ 08:37)   [facility]-- WOUND TEAM -- FOLLOW UP NOTE  --------------------------------------------------------------------------------    24 hour events/subjective:      Afebrile  Tolerating po w/o n/v  tolerating multilayer wrap     legs w/ less swelling & less heavy  pt now on pressors and CVVHD in CICU  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  pt had reaction to IV Conrast      Diet:  Diet, Consistent Carbohydrate w/Evening Snack (04-26-24 @ 06:08)      ROS: General/ SKIN/ MSK/ Vasc see HPI  all other systems negative      ALLERGIES & MEDICATIONS  --------------------------------------------------------------------------------  Allergies  contrast media (iodine-based) (Fever; Vomiting; Flushing; Hypotension; Rash; Stomach Upset)  Ativan (Rash; Urticaria)  penicillins (Hives (Mod to Severe); Anaphylaxis (Mod to Severe); Short breath (Mod to Severe); Angioedema (Mod to Severe))        STANDING INPATIENT MEDICATIONS  ascorbic acid 500 milliGRAM(s) Oral daily  aspirin  chewable 81 milliGRAM(s) Oral daily  atorvastatin 80 milliGRAM(s) Oral at bedtime  calamine/zinc oxide Lotion 1 Application(s) Topical three times a day  chlorhexidine 2% Cloths 1 Application(s) Topical daily  CRRT Treatment    <Continuous>  ferrous    sulfate 325 milliGRAM(s) Oral two times a day  heparin   Injectable 5000 Unit(s) SubCutaneous every 8 hours  heparin  Infusion Syringe 300 Unit(s)/Hr CRRT <Continuous>  insulin glargine Injectable (LANTUS) 24 Unit(s) SubCutaneous at bedtime  insulin lispro (ADMELOG) corrective regimen sliding scale   SubCutaneous at bedtime  insulin lispro (ADMELOG) corrective regimen sliding scale   SubCutaneous three times a day before meals  insulin lispro Injectable (ADMELOG) 4 Unit(s) SubCutaneous before lunch  insulin lispro Injectable (ADMELOG) 4 Unit(s) SubCutaneous before breakfast  insulin lispro Injectable (ADMELOG) 4 Unit(s) SubCutaneous before dinner  levothyroxine 75 MICROGram(s) Oral daily  multivitamin 1 Tablet(s) Oral daily  nystatin    Suspension 144365 Unit(s) Oral four times a day  petrolatum white Ointment 1 Application(s) Topical three times a day  PrismaSATE Dialysate BGK 4 / 2.5 5000 milliLiter(s) CRRT <Continuous>  PrismaSOL Filtration BGK 0 / 2.5 5000 milliLiter(s) CRRT <Continuous>  PrismaSOL Filtration BGK 4 / 2.5 5000 milliLiter(s) CRRT <Continuous>  senna 2 Tablet(s) Oral at bedtime  vancomycin  IVPB 1000 milliGRAM(s) IV Intermittent every 12 hours  vasopressin Infusion 0.06 Unit(s)/Min IV Continuous <Continuous>      PRN INPATIENT MEDICATION  polyethylene glycol 3350 17 Gram(s) Oral two times a day PRN        VITALS/PHYSICAL EXAM  --------------------------------------------------------------------------------  T(C): 37.3 (04-26-24 @ 08:00), Max: 37.4 (04-26-24 @ 04:00)  HR: 73 (04-26-24 @ 10:00) (43 - 97)  BP: --  RR: 16 (04-26-24 @ 10:00) (6 - 31)  SpO2: 97% (04-26-24 @ 10:00) (92% - 100%)  Wt(kg): --  Height (cm): 180.3 (04-25-24 @ 10:19)  Weight (kg): 149.7 (04-25-24 @ 10:19)  BMI (kg/m2): 46.1 (04-25-24 @ 10:19)  BSA (m2): 2.61 (04-25-24 @ 10:19)    guarded but stable,  A&Ox3, MO  WD/ WN/ WG,    Progressa bed  HEENT:  NC/AT, EOMI, sclera clear, mucosa moist, throat clear, trachea midline, neck supple  Respiratory: nonlabored w/ equal chest rise  Gastrointestinal: soft NT/ND   Neurology: strength & sensation grossly intact,   Psych: calm/ appropriate  Musculoskeletal: FROM, no deformities/ contractures  Vascular: BLE equally warm,  no cyanosis, clubbing, nor acute ischemia       BLE edema equal       BLE DP pulses palpable       BLE hemosiderin staining & lymphedema improving           soft plaques easily lifted w/ mechanical debridement       w/ open skin & blistering       and scant drainage      Rt plantar foot w/ dark blister around callous  No odor, erythema, increased warmth, tenderness, induration, fluctuance, nor crepitus  Skin: dry peeling flakey pale,  scattered psoriatic plaques throughout extremities and torso  bilateral buttock w/ faint erythema  w/o blistering  or  drainage  No odor, erythema, increased warmth, tenderness, induration, fluctuance, nor crepitus      LABS/ CULTURES/ RADIOLOGY:              8.9    17.42 >-----------<  107      [04-26-24 @ 00:44]              28.6     135  |  100  |  31  ----------------------------<  132      [04-26-24 @ 00:44]  3.9   |  24  |  2.25        Ca     7.7     [04-26-24 @ 00:44]      Mg     2.2     [04-26-24 @ 00:44]      Phos  2.8     [04-26-24 @ 00:44]    TPro  5.6  /  Alb  2.8  /  TBili  0.6  /  DBili  x   /  AST  19  /  ALT  8   /  AlkPhos  56  [04-26-24 @ 00:44]    PT/INR: PT 15.6 , INR 1.43       [04-25-24 @ 08:40]  PTT: 25.6       [04-25-24 @ 08:40]        CAPILLARY BLOOD GLUCOSE  POCT Blood Glucose.: 228 mg/dL (26 Apr 2024 07:39)  POCT Blood Glucose.: 225 mg/dL (26 Apr 2024 05:48)  POCT Blood Glucose.: 135 mg/dL (26 Apr 2024 02:04)  POCT Blood Glucose.: 137 mg/dL (26 Apr 2024 01:04)  POCT Blood Glucose.: 131 mg/dL (26 Apr 2024 00:05)  POCT Blood Glucose.: 141 mg/dL (25 Apr 2024 23:10)  POCT Blood Glucose.: 153 mg/dL (25 Apr 2024 22:00)  POCT Blood Glucose.: 159 mg/dL (25 Apr 2024 20:57)  POCT Blood Glucose.: 155 mg/dL (25 Apr 2024 20:07)  POCT Blood Glucose.: 136 mg/dL (25 Apr 2024 19:17)  POCT Blood Glucose.: 121 mg/dL (25 Apr 2024 18:09)  POCT Blood Glucose.: 125 mg/dL (25 Apr 2024 16:55)  POCT Blood Glucose.: 137 mg/dL (25 Apr 2024 16:08)  POCT Blood Glucose.: 154 mg/dL (25 Apr 2024 15:08)  POCT Blood Glucose.: 154 mg/dL (25 Apr 2024 14:06)  POCT Blood Glucose.: 189 mg/dL (25 Apr 2024 12:46)        A1C with Estimated Average Glucose Result: 7.4 % (03-30-24 @ 08:21)  A1C with Estimated Average Glucose Result: 6.8 % (01-10-24 @ 08:37)

## 2024-04-26 NOTE — PROGRESS NOTE ADULT - ASSESSMENT
73F w HFrEF (EF 30%), mod AS, known LBBB, HTN, IDDM1, CKD, hypothyroidism, chronic lymphedema, p/w 1 episode of syncope with R arm jerking, likely 2/2 severe symptomatic AS. Pending CTA imagings for TAVR eval, c/b DUNCAN on CKD, on bumex gtt.      Wound Consult requested to assist w/ management of Psoriasis  Drug reaction  BLE Lymphedema w/ wounds  Incontinence Dermatitis Of buttocks     BLE LAc Hydrin to intact skin + Adaptic dressing QD  Podiatry for foot wound  Appreciate Derm Consult for Psoriasis & Drug reaction  BLE elevation & Compression  Moisturize intact skin w/ SWEEN cream BID  Nutrition Consult for optimization        encourage high quality protein, cynthia/ prosource, MVI & Vit C to promote wound healing  Hyperglycemia -  ADA diet and NPH & FS w/ ISS  Continue turning and positioning w/ offloading assistive devices as per protocol  Buttocks/ Sacrum Patito BID and prn soiling        Continue w/ attends under pads and Pericare as per protocol  Waffle Cushion to chair when oob to chair  Continue w/ low air loss pressure redistribution bed surface   Pt will need Group 2 mattress on hospital bed and ROHO cushion for wheel chair upon discharge home  Care as per CICU/medicine, will follow w/ you  Upon discharge f/u as outpatient at Wound Center [address] [place] [address] [phone]  D/w team & RN & attng  Thank you for this consult  Staci Lau PA-C [id number] [id number]  Nights/ Weekends/ Holidays please call:  General Surgery Consult pager [phone]) for emergencies  Wound PT for multilayer leg wrapping or VAC issues (x [phone])   I spent 35minutes face to face w/ this pt of which more than 50% of the time was spent counseling & coordinating care of this pt.    73F w HFrEF (EF 30%), mod AS, known LBBB, HTN, IDDM1, CKD, hypothyroidism, chronic lymphedema, p/w 1 episode of syncope with R arm jerking, likely 2/2 severe symptomatic AS. Pending CTA imagings for TAVR eval, c/b DUNCAN on CKD, on bumex gtt.      Wound Consult requested to assist w/ management of Psoriasis  Drug reaction  BLE Lymphedema w/ wounds  Incontinence Dermatitis Of buttocks     BLE LAc Hydrin to intact skin + Adaptic dressing QD     will continue to monitor as pt on pressor & CVVHD  Podiatry for foot wound  Appreciate Derm Consult for Psoriasis & Drug reaction  BLE elevation & Compression  Moisturize intact skin w/ SWEEN cream BID  Nutrition Consult for optimization        encourage high quality protein, cynthia/ prosource, MVI & Vit C to promote wound healing  Hyperglycemia -  ADA diet and NPH & FS w/ ISS  Continue turning and positioning w/ offloading assistive devices as per protocol  Buttocks/ Sacrum Patito BID and prn soiling        Continue w/ attends under pads and Pericare as per protocol  Waffle Cushion to chair when oob to chair  Continue w/ low air loss pressure redistribution bed surface   Pt will need Group 2 mattress on hospital bed and ROHO cushion for wheel chair upon discharge home  Care as per CICU/medicine, will follow w/ you  Upon discharge f/u as outpatient at Wound Center [address] [place] [address] [phone]  D/w team & RN & attng  Thank you for this consult  Staci Lau PA-C CWS [id number]  Nights/ Weekends/ Holidays please call:  General Surgery Consult pager ([phone]) for emergencies  Wound PT for multilayer leg wrapping or VAC issues ([phone])   I spent 35minutes face to face w/ this pt of which more than 50% of the time was spent counseling & coordinating care of this pt.

## 2024-04-26 NOTE — PROGRESS NOTE ADULT - SUBJECTIVE AND OBJECTIVE BOX
Subjective  Patient feels very fatigued.  No acute events reported overnight.  Currently undergoing CVVH.  Denies any fevers, chills, sweats, light sensation, dizzy or palpitations.  Denies any pain at puncture site.    Review of systems  14 point review of systems is otherwise unremarkable separate described above in history of present illness    Telemetry  Sinus rhythm with baseline left bundle branch block    Physical Examination  No apparent distress, alert and oriented 3, appropriate affect  JVD cannot be appreciated, supple,  Decreased breath sounds heard bilaterally with no wheezing rhonchi crackles  Regular rate and rhythm with no murmur rub or gallop line positive bowel sound, obese, soft, nontender, nondistended, no mass and guarding rebound signs  1+ bilateral lower extremity edema  Moving all extremities spontaneously  1+ DP/PT pulses    Assessment/plan  Severe aortic valve stenosis  --Status post TAVR on 4/24/2023.  --Status post AV Micra on 4/25/2024.    --Patient clinically doing well following her high risk TAVR procedure.  She is being medically optimized by the CICU team.  Wean vasopressin as tolerated.  Noted to have elevated white blood cell count for which cultures have been sent.  Would appreciate recommendations from ID team for assistance.  --At baseline the patient is on home oxygen for underlying COPD.  Also noted to have obstructive sleep apnea.  --Closely monitor puncture site for any signs/symptoms of an infectious process.  -- Uptitrate goal-directed medical therapy as tolerated once patient is off of vasopressin.  -- Patient at increased risk for DVT/PE.  Continue heparin DVT prophylaxis.  -- Continue aspirin 81 mg daily.  -- Continue atorvastatin 80 mg daily.  -- Appreciate assistance from consulting teams.    All questions and concerns of the patient were reviewed.    Time-based billing (NON-critical care).   35 minutes spent on total encounter. The necessity of the time spent during the encounter on this date of service was due to: education, assessment and coordination of care.    Findings and plan were discussed with CICU/Dr. Quintero, nephrology/Dr. Merino and cardiac surgery/Dr. Mcknight.    Time-based billing (NON-critical care).   35 minutes spent on total encounter. The necessity of the time spent during the encounter on this date of service was due to: education, assessment and coordination of care.

## 2024-04-26 NOTE — PROGRESS NOTE ADULT - SUBJECTIVE AND OBJECTIVE BOX
24H hour events:     MEDICATIONS:  aspirin  chewable 81 milliGRAM(s) Oral daily  heparin   Injectable 5000 Unit(s) SubCutaneous every 8 hours  heparin  Infusion Syringe 300 Unit(s)/Hr CRRT <Continuous>    nystatin    Suspension 553107 Unit(s) Oral four times a day  vancomycin  IVPB 1000 milliGRAM(s) IV Intermittent every 12 hours        polyethylene glycol 3350 17 Gram(s) Oral two times a day PRN  senna 2 Tablet(s) Oral at bedtime    atorvastatin 80 milliGRAM(s) Oral at bedtime  insulin glargine Injectable (LANTUS) 24 Unit(s) SubCutaneous at bedtime  insulin lispro (ADMELOG) corrective regimen sliding scale   SubCutaneous at bedtime  insulin lispro (ADMELOG) corrective regimen sliding scale   SubCutaneous three times a day before meals  insulin lispro Injectable (ADMELOG) 4 Unit(s) SubCutaneous before breakfast  insulin lispro Injectable (ADMELOG) 4 Unit(s) SubCutaneous before dinner  insulin lispro Injectable (ADMELOG) 4 Unit(s) SubCutaneous before lunch  levothyroxine 75 MICROGram(s) Oral daily  vasopressin Infusion 0.06 Unit(s)/Min IV Continuous <Continuous>    ascorbic acid 500 milliGRAM(s) Oral daily  calamine/zinc oxide Lotion 1 Application(s) Topical three times a day  chlorhexidine 2% Cloths 1 Application(s) Topical daily  ferrous    sulfate 325 milliGRAM(s) Oral two times a day  multivitamin 1 Tablet(s) Oral daily  petrolatum white Ointment 1 Application(s) Topical three times a day      REVIEW OF SYSTEMS:  See HPI, otherwise ROS negative.    PHYSICAL EXAM:  T(C): 37.3 (04-26-24 @ 08:00), Max: 37.4 (04-26-24 @ 04:00)  HR: 73 (04-26-24 @ 10:00) (43 - 97)  BP: --  RR: 16 (04-26-24 @ 10:00) (6 - 31)  SpO2: 97% (04-26-24 @ 10:00) (92% - 100%)  Wt(kg): --  I&O's Summary    25 Apr 2024 07:01  -  26 Apr 2024 07:00  --------------------------------------------------------  IN: 1157.9 mL / OUT: 3398 mL / NET: -2240.1 mL    26 Apr 2024 07:01  -  26 Apr 2024 10:43  --------------------------------------------------------  IN: 385.5 mL / OUT: 381 mL / NET: 4.5 mL        Appearance: Alert. NAD	  HEENT:   NC/AT	  Cardiovascular: +S1S2 RRR no m/g/r  Respiratory: CTA B/L	  Psychiatry: A & O x 3, Mood & affect appropriate  Gastrointestinal:  Soft, NT.ND., + BS	  Skin: No rashes	  Neurologic: Non-focal  Extremities: No edema BLE  Vascular: Peripheral pulses palpable 2+ bilaterally      LABS:	 	    CBC Full  -  ( 26 Apr 2024 00:44 )  WBC Count : 17.42 K/uL  Hemoglobin : 8.9 g/dL  Hematocrit : 28.6 %  Platelet Count - Automated : 107 K/uL  Mean Cell Volume : 89.1 fl  Mean Cell Hemoglobin : 27.7 pg  Mean Cell Hemoglobin Concentration : 31.1 gm/dL  Auto Neutrophil # : 14.78 K/uL  Auto Lymphocyte # : 0.84 K/uL  Auto Monocyte # : 1.05 K/uL  Auto Eosinophil # : 0.06 K/uL  Auto Basophil # : 0.09 K/uL  Auto Neutrophil % : 85.0 %  Auto Lymphocyte % : 4.8 %  Auto Monocyte % : 6.0 %  Auto Eosinophil % : 0.3 %  Auto Basophil % : 0.5 %    04-26    135  |  100  |  31<H>  ----------------------------<  132<H>  3.9   |  24  |  2.25<H>  04-25    135  |  100  |  31<H>  ----------------------------<  172<H>  3.9   |  24  |  2.23<H>    Ca    7.7<L>      26 Apr 2024 00:44  Ca    8.0<L>      25 Apr 2024 20:57  Phos  2.8     04-26  Phos  3.2     04-25  Mg     2.2     04-26  Mg     2.2     04-25    TPro  5.6<L>  /  Alb  2.8<L>  /  TBili  0.6  /  DBili  x   /  AST  19  /  ALT  8<L>  /  AlkPhos  56  04-26  TPro  5.8<L>  /  Alb  2.8<L>  /  TBili  0.6  /  DBili  x   /  AST  17  /  ALT  8<L>  /  AlkPhos  54  04-25      proBNP:   Lipid Profile:   HgA1c:   TSH:       CARDIAC MARKERS:          TELEMETRY:   	    ECG:  	    RADIOLOGY:    	  ASSESSMENT/PLAN: 	     24H hour events: Pt without acute complaints. Extubated now but remains on vasopressin gtt.     MEDICATIONS:  aspirin  chewable 81 milliGRAM(s) Oral daily  heparin   Injectable 5000 Unit(s) SubCutaneous every 8 hours  heparin  Infusion Syringe 300 Unit(s)/Hr CRRT <Continuous>    nystatin    Suspension 735397 Unit(s) Oral four times a day  vancomycin  IVPB 1000 milliGRAM(s) IV Intermittent every 12 hours        polyethylene glycol 3350 17 Gram(s) Oral two times a day PRN  senna 2 Tablet(s) Oral at bedtime    atorvastatin 80 milliGRAM(s) Oral at bedtime  insulin glargine Injectable (LANTUS) 24 Unit(s) SubCutaneous at bedtime  insulin lispro (ADMELOG) corrective regimen sliding scale   SubCutaneous at bedtime  insulin lispro (ADMELOG) corrective regimen sliding scale   SubCutaneous three times a day before meals  insulin lispro Injectable (ADMELOG) 4 Unit(s) SubCutaneous before breakfast  insulin lispro Injectable (ADMELOG) 4 Unit(s) SubCutaneous before dinner  insulin lispro Injectable (ADMELOG) 4 Unit(s) SubCutaneous before lunch  levothyroxine 75 MICROGram(s) Oral daily  vasopressin Infusion 0.06 Unit(s)/Min IV Continuous <Continuous>    ascorbic acid 500 milliGRAM(s) Oral daily  calamine/zinc oxide Lotion 1 Application(s) Topical three times a day  chlorhexidine 2% Cloths 1 Application(s) Topical daily  ferrous    sulfate 325 milliGRAM(s) Oral two times a day  multivitamin 1 Tablet(s) Oral daily  petrolatum white Ointment 1 Application(s) Topical three times a day      REVIEW OF SYSTEMS:  See HPI, otherwise ROS negative.    PHYSICAL EXAM:  T(C): 37.3 (04-26-24 @ 08:00), Max: 37.4 (04-26-24 @ 04:00)  HR: 73 (04-26-24 @ 10:00) (43 - 97)  BP: --  RR: 16 (04-26-24 @ 10:00) (6 - 31)  SpO2: 97% (04-26-24 @ 10:00) (92% - 100%)  Wt(kg): --  I&O's Summary    25 Apr 2024 07:01  -  26 Apr 2024 07:00  --------------------------------------------------------  IN: 1157.9 mL / OUT: 3398 mL / NET: -2240.1 mL    26 Apr 2024 07:01  -  26 Apr 2024 10:43  --------------------------------------------------------  IN: 385.5 mL / OUT: 381 mL / NET: 4.5 mL        Appearance: Alert. NAD	  HEENT:   NC/AT	  Cardiovascular: +S1S2  Respiratory: CTA B/L	  Psychiatry: A & O x 3, Mood & affect appropriate  Gastrointestinal:  Soft	  Skin: R and L groin access sites benign without hematoma/bleeding  Neurologic: Non-focal  Extremities: +edema b/l upper and lower extremities      LABS:	 	    CBC Full  -  ( 26 Apr 2024 00:44 )  WBC Count : 17.42 K/uL  Hemoglobin : 8.9 g/dL  Hematocrit : 28.6 %  Platelet Count - Automated : 107 K/uL  Mean Cell Volume : 89.1 fl  Mean Cell Hemoglobin : 27.7 pg  Mean Cell Hemoglobin Concentration : 31.1 gm/dL  Auto Neutrophil # : 14.78 K/uL  Auto Lymphocyte # : 0.84 K/uL  Auto Monocyte # : 1.05 K/uL  Auto Eosinophil # : 0.06 K/uL  Auto Basophil # : 0.09 K/uL  Auto Neutrophil % : 85.0 %  Auto Lymphocyte % : 4.8 %  Auto Monocyte % : 6.0 %  Auto Eosinophil % : 0.3 %  Auto Basophil % : 0.5 %    04-26    135  |  100  |  31<H>  ----------------------------<  132<H>  3.9   |  24  |  2.25<H>  04-25    135  |  100  |  31<H>  ----------------------------<  172<H>  3.9   |  24  |  2.23<H>    Ca    7.7<L>      26 Apr 2024 00:44  Ca    8.0<L>      25 Apr 2024 20:57  Phos  2.8     04-26  Phos  3.2     04-25  Mg     2.2     04-26  Mg     2.2     04-25    TPro  5.6<L>  /  Alb  2.8<L>  /  TBili  0.6  /  DBili  x   /  AST  19  /  ALT  8<L>  /  AlkPhos  56  04-26  TPro  5.8<L>  /  Alb  2.8<L>  /  TBili  0.6  /  DBili  x   /  AST  17  /  ALT  8<L>  /  AlkPhos  54  04-25      TELEMETRY: NSR with 1st degree 80s  	    ECG:  	Mobitz 1, LBBB    RADIOLOGY: Post Micra CXR with good placement    	  ASSESSMENT/PLAN:

## 2024-04-26 NOTE — PROGRESS NOTE ADULT - SUBJECTIVE AND OBJECTIVE BOX
[facility]   [phone]  RENAL FOLLOW UP NOTE  --------------------------------------------------------------------------------  Chief Complaint:    24 hour events/subjective:    seen earlier   on CRRT  extubated  on O2     PAST HISTORY  --------------------------------------------------------------------------------  No significant changes to PMH, PSH, FHx, SHx, unless otherwise noted    ALLERGIES & MEDICATIONS  --------------------------------------------------------------------------------  Allergies    contrast media (iodine-based) (Fever; Vomiting; Flushing; Hypotension; Rash; Stomach Upset)  Ativan (Rash; Urticaria)  penicillins (Hives (Mod to Severe); Anaphylaxis (Mod to Severe); Short breath (Mod to Severe); Angioedema (Mod to Severe))    Intolerances      Standing Inpatient Medications  ascorbic acid 500 milliGRAM(s) Oral daily  aspirin  chewable 81 milliGRAM(s) Oral daily  atorvastatin 80 milliGRAM(s) Oral at bedtime  calamine/zinc oxide Lotion 1 Application(s) Topical three times a day  chlorhexidine 2% Cloths 1 Application(s) Topical daily  CRRT Treatment    <Continuous>  ferrous    sulfate 325 milliGRAM(s) Oral two times a day  heparin   Injectable 5000 Unit(s) SubCutaneous every 8 hours  heparin  Infusion Syringe 300 Unit(s)/Hr CRRT <Continuous>  insulin glargine Injectable (LANTUS) 24 Unit(s) SubCutaneous at bedtime  insulin lispro (ADMELOG) corrective regimen sliding scale   SubCutaneous three times a day before meals  insulin lispro (ADMELOG) corrective regimen sliding scale   SubCutaneous at bedtime  insulin lispro Injectable (ADMELOG) 8 Unit(s) SubCutaneous three times a day before meals  levothyroxine 75 MICROGram(s) Oral daily  multivitamin 1 Tablet(s) Oral daily  nystatin    Suspension 176281 Unit(s) Oral four times a day  petrolatum white Ointment 1 Application(s) Topical three times a day  PrismaSATE Dialysate BGK 4 / 2.5 5000 milliLiter(s) CRRT <Continuous>  PrismaSOL Filtration BGK 0 / 2.5 5000 milliLiter(s) CRRT <Continuous>  PrismaSOL Filtration BGK 4 / 2.5 5000 milliLiter(s) CRRT <Continuous>  senna 2 Tablet(s) Oral at bedtime  vancomycin  IVPB 1000 milliGRAM(s) IV Intermittent every 12 hours  vasopressin Infusion 0.06 Unit(s)/Min IV Continuous <Continuous>    PRN Inpatient Medications  polyethylene glycol 3350 17 Gram(s) Oral two times a day PRN      REVIEW OF SYSTEMS  --------------------------------------------------------------------------------    Gen: denies  fevers/chills,  CVS: denies chest pain/palpitations  Resp: denies worsening SOB/Cough  GI: Denies N/V/Abd pain  : Denies dysuria    VITALS/PHYSICAL EXAM  --------------------------------------------------------------------------------  T(C): 37.2 (04-26-24 @ 19:00), Max: 37.4 (04-26-24 @ 04:00)  HR: 83 (04-26-24 @ 19:15) (66 - 97)  BP: --  RR: 14 (04-26-24 @ 19:15) (5 - 31)  SpO2: 100% (04-26-24 @ 19:15) (94% - 100%)  Wt(kg): --  Height (cm): 180.3 (04-25-24 @ 10:19)  Weight (kg): 149.7 (04-25-24 @ 10:19)  BMI (kg/m2): 46.1 (04-25-24 @ 10:19)  BSA (m2): 2.61 (04-25-24 @ 10:19)      04-25-24 @ 07:01  -  04-26-24 @ 07:00  --------------------------------------------------------  IN: 1157.9 mL / OUT: 3398 mL / NET: -2240.1 mL    04-26-24 @ 07:01  -  04-26-24 @ 19:24  --------------------------------------------------------  IN: 1236.5 mL / OUT: 2445 mL / NET: -1208.5 mL      Physical Exam:  	  Gen:  on RA, facial erythema, neck and arm erythema,  intubated   	Pulm: decrease bs  no rales or ronchi or wheezing  	CV: No JVD. RRR, S1S2; no rub II/VI M   	Abd: +BS, soft, nontender/nondistended, obese   	UE: Warm, no cyanosis  no clubbing,   +  edema  	LE: Warm, no cyanosis  no clubbing, 4+ edema, B/L LE raised red plaque   	Neuro: intubated                Vascular Access: RIJ non tunneled HD catheter    LABS/STUDIES  --------------------------------------------------------------------------------              8.9    17.42 >-----------<  107      [04-26-24 @ 00:44]              28.6     135  |  100  |  31  ----------------------------<  132      [04-26-24 @ 00:44]  3.9   |  24  |  2.25        Ca     7.7     [04-26-24 @ 00:44]      Mg     2.2     [04-26-24 @ 00:44]      Phos  2.8     [04-26-24 @ 00:44]    TPro  5.6  /  Alb  2.8  /  TBili  0.6  /  DBili  x   /  AST  19  /  ALT  8   /  AlkPhos  56  [04-26-24 @ 00:44]    PT/INR: PT 15.6 , INR 1.43       [04-25-24 @ 08:40]  PTT: 25.6       [04-25-24 @ 08:40]      Creatinine Trend:  SCr 2.25 [04-26 @ 00:44]  SCr 2.23 [04-25 @ 20:57]  SCr 2.46 [04-25 @ 15:22]  SCr 2.47 [04-25 @ 08:40]  SCr 2.96 [04-25 @ 01:19]        PTH -- (Ca 8.4)      [04-19-24 @ 17:54]   109  TSH 5.06      [04-14-24 @ 07:18]  Lipid: chol 74, TG 70, HDL 33, LDL --      [04-13-24 @ 07:05]

## 2024-04-26 NOTE — PROGRESS NOTE ADULT - ASSESSMENT
73F w HFrEF (EF 30%), mod AS, known LBBB, HTN, IDDM1, CKD, hypothyroidism, chronic lymphedema, p/w 1 episode of syncope with R arm jerking.     #Syncope-Aortic Stenosis  - Known Aortic Stenosis likely Severe in setting of decreased LVEF  - s/p tavr on 4/24 c/b VT -> shock -> VFib -> shock  - hypoxic/congested, and was intubated, now extubated on 4/25 in the evening, on 3L NC this morning  - overnight on 4/25 with worsening bradycardia, and now s/p TVP placement followed by AV micra placement with removal of TVP  - cont pressors as needed, currently on Vaso  - cvvhd as per Renal    #Chronic Systolic HF (EF 30%), mod to severe AS, HTN, LBBB, Lymphedema   - Has known systolic HF and AS.    - Repeat TTE showed EF 30%, mild-mod LVH, mildly reduced RVF, mod-severe AS (.61 cmsq), mod pHTN  - On home Torsemide 20 mg daily, Eplerenone 25 mg daily, and Toprol  mg daily  - Compliant with all meds including Torsemide, though, dose was reduced to Furosemide 40mg once daily. All on hold in the setting of shock  - proBNP 51K from 80k.   - GDMT on hold in the setting of shock  - Was on bumex gtt but then overnight 4/15 developed a fever to 102F and became hypotensive overnight.  - then started on HD    #LBBB  Known LBBB  Noted intermittent Wenckebach on telemetry with bradycardia and 2:1 AV conduction  - EP has been consulted.   -s/p tvp placement followed by AV Micra placement and removal of TVP    - very high risk of decompensation

## 2024-04-26 NOTE — PROGRESS NOTE ADULT - SUBJECTIVE AND OBJECTIVE BOX
[facility] - Howard Kimble, Carlos Luong Savella, [place]  Office Number:  [phone]    Patient resting comfortably in bed in NAD.    Remains on 3L NC saturating 98%. Extubated last evening.   Remains on CVVHD and on Vaso 0.08, MAP 66 this morning  States she feels weak    ROS: negative unless otherwise mentioned.    Telemetry: SR    MEDICATIONS  (STANDING):  ascorbic acid 500 milliGRAM(s) Oral daily  aspirin  chewable 81 milliGRAM(s) Oral daily  atorvastatin 80 milliGRAM(s) Oral at bedtime  calamine/zinc oxide Lotion 1 Application(s) Topical three times a day  chlorhexidine 2% Cloths 1 Application(s) Topical daily  CRRT Treatment    <Continuous>  ferrous    sulfate 325 milliGRAM(s) Oral two times a day  heparin   Injectable 5000 Unit(s) SubCutaneous every 8 hours  insulin glargine Injectable (LANTUS) 24 Unit(s) SubCutaneous at bedtime  insulin lispro (ADMELOG) corrective regimen sliding scale   SubCutaneous at bedtime  insulin lispro (ADMELOG) corrective regimen sliding scale   SubCutaneous three times a day before meals  insulin lispro Injectable (ADMELOG) 4 Unit(s) SubCutaneous before lunch  insulin lispro Injectable (ADMELOG) 4 Unit(s) SubCutaneous before breakfast  insulin lispro Injectable (ADMELOG) 4 Unit(s) SubCutaneous before dinner  levothyroxine 75 MICROGram(s) Oral daily  multivitamin 1 Tablet(s) Oral daily  nystatin    Suspension 984029 Unit(s) Oral four times a day  petrolatum white Ointment 1 Application(s) Topical three times a day  PrismaSATE Dialysate BGK 4 / 2.5 5000 milliLiter(s) (1400 mL/Hr) CRRT <Continuous>  PrismaSOL Filtration BGK 0 / 2.5 5000 milliLiter(s) (1000 mL/Hr) CRRT <Continuous>  PrismaSOL Filtration BGK 4 / 2.5 5000 milliLiter(s) (400 mL/Hr) CRRT <Continuous>  senna 2 Tablet(s) Oral at bedtime  vancomycin  IVPB 750 milliGRAM(s) IV Intermittent every 12 hours  vasopressin Infusion 0.06 Unit(s)/Min (9 mL/Hr) IV Continuous <Continuous>    MEDICATIONS  (PRN):  polyethylene glycol 3350 17 Gram(s) Oral two times a day PRN Constipation      Allergies    contrast media (iodine-based) (Fever; Vomiting; Flushing; Hypotension; Rash; Stomach Upset)  Ativan (Rash; Urticaria)  penicillins (Hives (Mod to Severe); Anaphylaxis (Mod to Severe); Short breath (Mod to Severe); Angioedema (Mod to Severe))    Intolerances        Vital Signs Last 24 Hrs  T(C): 37.3 (26 Apr 2024 08:00), Max: 37.4 (26 Apr 2024 04:00)  T(F): 99.1 (26 Apr 2024 08:00), Max: 99.3 (26 Apr 2024 04:00)  HR: 86 (26 Apr 2024 08:51) (43 - 97)  BP: 138/63 (25 Apr 2024 10:19) (138/63 - 138/63)  BP(mean): 90 (25 Apr 2024 10:19) (90 - 90)  RR: 20 (26 Apr 2024 08:15) (6 - 31)  SpO2: 99% (26 Apr 2024 08:51) (92% - 100%)    Parameters below as of 26 Apr 2024 08:00  Patient On (Oxygen Delivery Method): nasal cannula w/ humidification  O2 Flow (L/min): 3      I&O's Summary    25 Apr 2024 07:01  -  26 Apr 2024 07:00  --------------------------------------------------------  IN: 1157.9 mL / OUT: 3398 mL / NET: -2240.1 mL    26 Apr 2024 07:01  -  26 Apr 2024 09:39  --------------------------------------------------------  IN: 385.5 mL / OUT: 381 mL / NET: 4.5 mL        ON EXAM:    General: NAD  HEENT: Mucous membranes are moist, anicteric  Lungs: Non-labored, breath sounds are decreased bilaterally, No wheezing, rales or rhonchi  Cardiovascular: Regular, S1 and S2, no murmurs, rubs, or gallops  Gastrointestinal: Bowel Sounds present, soft, nontender.   Lymph: chronic peripheral edema. No lymphadenopathy.  Skin: + rash  Psych:  unable to assess    LABS: All Labs Reviewed:                        8.9    17.42 )-----------( 107      ( 26 Apr 2024 00:44 )             28.6                         9.9    17.43 )-----------( 120      ( 25 Apr 2024 08:40 )             31.2                         10.0   20.62 )-----------( 124      ( 25 Apr 2024 01:19 )             33.6     26 Apr 2024 00:44    135    |  100    |  31     ----------------------------<  132    3.9     |  24     |  2.25   25 Apr 2024 20:57    135    |  100    |  31     ----------------------------<  172    3.9     |  24     |  2.23   25 Apr 2024 15:22    133    |  98     |  36     ----------------------------<  145    3.8     |  22     |  2.46     Ca    7.7        26 Apr 2024 00:44  Ca    8.0        25 Apr 2024 20:57  Ca    7.9        25 Apr 2024 15:22  Phos  2.8       26 Apr 2024 00:44  Phos  3.2       25 Apr 2024 20:57  Phos  2.8       25 Apr 2024 15:22  Mg     2.2       26 Apr 2024 00:44  Mg     2.2       25 Apr 2024 20:57  Mg     2.2       25 Apr 2024 15:22    TPro  5.6    /  Alb  2.8    /  TBili  0.6    /  DBili  x      /  AST  19     /  ALT  8      /  AlkPhos  56     26 Apr 2024 00:44  TPro  5.8    /  Alb  2.8    /  TBili  0.6    /  DBili  x      /  AST  17     /  ALT  8      /  AlkPhos  54     25 Apr 2024 20:57  TPro  6.0    /  Alb  2.9    /  TBili  0.7    /  DBili  x      /  AST  19     /  ALT  11     /  AlkPhos  59     25 Apr 2024 15:22    PT/INR - ( 25 Apr 2024 08:40 )   PT: 15.6 sec;   INR: 1.43 ratio         PTT - ( 25 Apr 2024 08:40 )  PTT:25.6 sec      Blood Culture:

## 2024-04-26 NOTE — PROGRESS NOTE ADULT - ASSESSMENT
73F w HFrEF (EF 30%), mod AS, known LBBB, HTN, T1DM, CKD, hypothyroidism, chronic lymphedema, suspected OHS/LELIA on 3L NC home O2 p/w 1 episode of syncope with R arm jerking, likely 2/2 severe symptomatic AS. CTA imagings performed for TAVR eval, c/b DUNCAN on CKD. C/b febrile and hypotension, sepsis workup pending. CT c/a/p w/w/o IV contrast revealed incidental finding of L adrenal nodule.    #L adrenal nodule   Incidentally found on CT c/a/p w/wo IV contrast done for TAVR evaluation. The left adrenal gland is thickened and a 1.2 x 0.8 cm left adrenal nodule is seen. The right adrenal gland is normal.  Primary team discussed with radiology. HU of the adrenal nodule not able to be accurately determined due to motion artifact, also given imaging was taken at venous phase.     Hx HTN, T1DM, obesity difficulty losing weight.    Test for excess adrenal hormones:  - Dex suppression test: AM cortisol 9.4 not suppressed with sufficiently high dexamethasone 374 (4/18/24). Repeat test AM cortisol 4.1 not suppressed with ACTH 9.5, dex level pending. Concerning for possible Cushings, likely primary adrenal source given ACTH not elevated. Follow up 24 hour urine cortisol and creatinine as confirmatory testing to r/o Cushings'. Salivary cortisol cancelled for QNS.  - plasma metanephrines 46.8 normal range   - serum aldosterone is elevated to 37.4. Plasma renin activity 9.775. Ratio = 3.82, not elevated, no hyperaldosteronism.    - DHEAS 139 wnl. Androstenedione <10.    Recommendations:  - Follow up remaining cushings' workup: 2nd dex suppression test dexamethasone level, 24h urine cortisol.   - Please Recheck midnight salivary cortisol   - Nonurgent CT non-contrast adrenal protocol to determine hounsfield units of adrenal nodule. (Of note, CT features suggesting malignancy include HU >10, mass size >4cm, and >50% contrast retention seen 10 minutes after contrast administration (contrast retention is of low specificity/sensitivity).)  - Will need follow up outpatient with Dr. Luis Dumont    #T1DM  #DKA  Has hx of T1DM since age 26  Endocrinologist: Dr. Luis Dumont  Home regimen: Lantus 33 units qhs, Novolog based on sliding scale TIDAC normally 3-5 units  Has Dexcom G7  A1c is 7.4%  C peptide undetectable <0.1 with glucose 107, confirming insulin deficiency  Transitioned to basal/bolus from insulin gtt  - Recommend increase Lantus 24 units QHS  - Recommend increase Admelog to 8 units TIDAC  - Low dose admelog correction scale TIDAC, Low dose admelog correction scale QHS  - Goal BG in -180  - Of note, patient instructed on discharge from recent hospitalization at [facility] to start farxiga for CHF. Given risks of DKA of SGLT2i in T1DM, would not start until better data is available for its benefits.  - Discharge regimen: home basal/bolus insulin, dose TBD.  - Follow up with Dr. Luis Dumont outpatient    #Hx of Hypothyroidism  TSH slightly elevated to 4.79-5.06 with normal FT4 1.3-1.5  - continue home LT4 75mcg daily  - repeat TFT's outpatient when clinically stabilized    Calvin Astudillo MD  Endocrine Fellow  Can be reached via teams. For follow up questions, discharge recommendations, or new consults, please call answering service at [phone] (weekdays); [phone] (nights/weekends).

## 2024-04-26 NOTE — PROGRESS NOTE ADULT - ASSESSMENT
72 y/o F with PMHx of HFrEF (EF 30%), mod AS, known 1st degree AVB and LBBB, HTN, IDDM1, CKD, hypothyroidism, chronic lymphedema, suspected OHS/LELIA on 3L NC home O2 p/w 1 episode of syncope. Recent admission at \[facility]\"" (3/29-4/5) for ADHF and DUNCAN s/p diuresis.   Pt states she does not remember her syncopal episode. She remembers walking to the car and getting into it. She denies any preceding symptoms. She was told that she "blacked out" and was not responding for 10-15 seconds with arm shaking. EKG consistent with sinus bradycardia at 58bpm, 1st degree AVB (AUDRA 318ms) with LBBB (QRSd 176). Early AM 4/14, she was noted to have 2:1 AV block. Pt also with e/o Wenckebach on 4/14.  She was on Toprol XL 100mg at home but has not taken it since 4/12.    She is s/p TAVR (Emmanuel) on 4/24 with Dr. Martell, post procedure remained intubated and sedated on low dose Vaso. Early 2/25 AM w/ Wenckebach 30-40's, CICU placed TVP. She was conducting 1:1 in SR w/ baseline LBBB 4/25 with Wenckebach and rare pacing at VVI30.     1) Syncope  2) 2:1 AV block with known 1st degree AVB (Audra 318ms) with LBBB (QRSd 176)  3) HFrEF with LVEF 30%  4) Aortic stenosis, severe   5) Lymphedema with significant LE edema and rash    Pt is now s/p Micra AV placement 4/25/24. Now extubated but remains on vasopressin gtt    Recommendations:  - WCK appt to be arranged and our office to contact pt with f/u appt information  - Ultimately patient may warrant CRT-D placement noting conduction disease and cardiomyopathy with LVEF 30% and LBBB with QRSd 176. However, at this time she is not a candidate for CRT at this time noting infectious risk due to LE swelling/cellulitis and ESRD on HD via temporary HDC  - S/p TAVR 4/24 w/ TTE today: no paravalvular leak, LVEF 25% with global hypokinesis, elevated filling pressure, moderately enlarged RA/RV   - ID following for potential infectious etiology of fever. Likely allergic reaction to contrast. Off antibiotics. No further fevers noted  - Rest of care per CICU 72 y/o F with PMHx of HFrEF (EF 30%), mod AS, known 1st degree AVB and LBBB, HTN, IDDM1, CKD, hypothyroidism, chronic lymphedema, suspected OHS/LELIA on 3L NC home O2 p/w 1 episode of syncope. Recent admission at [facility] (3/29-4/5) for ADHF and DUNCAN s/p diuresis.   Pt states she does not remember her syncopal episode. She remembers walking to the car and getting into it. She denies any preceding symptoms. She was told that she "blacked out" and was not responding for 10-15 seconds with arm shaking. EKG consistent with sinus bradycardia at 58bpm, 1st degree AVB (AUDRA 318ms) with LBBB (QRSd 176). Early AM 4/14, she was noted to have 2:1 AV block. Pt also with e/o Wenckebach on 4/14.  She was on Toprol XL 100mg at home but has not taken it since 4/12.    She is s/p TAVR (Emmanuel) on 4/24 with Dr. Martell, post procedure remained intubated and sedated on low dose Vaso. Early 2/25 AM w/ Wenckebach 30-40's, CICU placed TVP. She was conducting 1:1 in SR w/ baseline LBBB 4/25 with Wenckebach and rare pacing at VVI30.     1) Syncope  2) 2:1 AV block with known 1st degree AVB (Audra 318ms) with LBBB (QRSd 176)  3) HFrEF with LVEF 30%  4) Aortic stenosis, severe   5) Lymphedema with significant LE edema and rash    Pt is now s/p Micra AV placement 4/25/24. Now extubated but remains on vasopressin gtt. CXR post Micra with good placement. R femoral access site benign.     Recommendations:  - WCK appt to be arranged and our office to contact pt with f/u appt information  - Ultimately patient may warrant CRT-D placement noting conduction disease and cardiomyopathy with LVEF 30% and LBBB with QRSd 176. However, at this time she is not a candidate for CRT noting infectious risk due to LE swelling/cellulitis and ESRD on HD via temporary HDC  - S/p TAVR 4/24 w/ TTE 4/25: no paravalvular leak, LVEF 25% with global hypokinesis, elevated filling pressure, moderately enlarged RA/RV   - ID following for potential infectious etiology of fever. Likely allergic reaction to contrast. Off antibiotics. No further fevers noted  - Rest of care per CICU 74 y/o F with PMHx of HFrEF (EF 30%), mod AS, known 1st degree AVB and LBBB, HTN, IDDM1, CKD, hypothyroidism, chronic lymphedema, suspected OHS/LELIA on 3L NC home O2 p/w 1 episode of syncope. Recent admission at [facility] (3/29-4/5) for ADHF and DUNCAN s/p diuresis.   Pt states she does not remember her syncopal episode. She remembers walking to the car and getting into it. She denies any preceding symptoms. She was told that she "blacked out" and was not responding for 10-15 seconds with arm shaking. EKG consistent with sinus bradycardia at 58bpm, 1st degree AVB (AUDRA 318ms) with LBBB (QRSd 176). Early AM 4/14, she was noted to have 2:1 AV block. Pt also with e/o Wenckebach on 4/14.  She was on Toprol XL 100mg at home but has not taken it since 4/12.    She is s/p TAVR (Emmanuel) on 4/24 with Dr. Martell, post procedure remained intubated and sedated on low dose Vaso. Early 2/25 AM w/ Wenckebach 30-40's, CICU placed TVP. She was conducting 1:1 in SR w/ baseline LBBB 4/25 with Wenckebach and rare pacing at VVI30.     1) Syncope  2) 2:1 AV block with known 1st degree AVB (Audra 318ms) with LBBB (QRSd 176)  3) HFrEF with LVEF 30%  4) Aortic stenosis, severe   5) Lymphedema with significant LE edema and rash    Pt is now s/p Micra AV placement 4/25/24. Now extubated but remains on vasopressin gtt. CXR post Micra with good placement. R femoral access site benign. Device check by rep this morning WNL    Recommendations:  - WCK appt to be arranged and our office to contact pt with f/u appt information  - Ultimately patient may warrant CRT-D placement noting conduction disease and cardiomyopathy with LVEF 30% and LBBB with QRSd 176. However, at this time she is not a candidate for CRT noting infectious risk due to LE swelling/cellulitis and ESRD on HD via temporary HDC  - S/p TAVR 4/24 w/ TTE 4/25: no paravalvular leak, LVEF 25% with global hypokinesis, elevated filling pressure, moderately enlarged RA/RV   - ID following for potential infectious etiology of fever. Likely allergic reaction to contrast. Off antibiotics. No further fevers noted  - Rest of care per CICU 72 y/o F with PMHx of HFrEF (EF 30%), mod AS, known 1st degree AVB and LBBB, HTN, IDDM1, CKD, hypothyroidism, chronic lymphedema, suspected OHS/LELIA on 3L NC home O2 p/w 1 episode of syncope. Recent admission at [facility] (3/29-4/5) for ADHF and DUNCAN s/p diuresis.   Pt states she does not remember her syncopal episode. She remembers walking to the car and getting into it. She denies any preceding symptoms. She was told that she "blacked out" and was not responding for 10-15 seconds with arm shaking. EKG consistent with sinus bradycardia at 58bpm, 1st degree AVB (AUDRA 318ms) with LBBB (QRSd 176). Early AM 4/14, she was noted to have 2:1 AV block. Pt also with e/o Wenckebach on 4/14.  She was on Toprol XL 100mg at home but has not taken it since 4/12.    She is s/p TAVR (Emmanuel) on 4/24 with Dr. Martell, post procedure remained intubated and sedated on low dose Vaso. Early 2/25 AM w/ Wenckebach 30-40's, CICU placed TVP. She was conducting 1:1 in SR w/ baseline LBBB 4/25 with Wenckebach and rare pacing at VVI30.     1) Syncope  2) 2:1 AV block with known 1st degree AVB (Audra 318ms) with LBBB (QRSd 176)  3) HFrEF with LVEF 30%  4) Aortic stenosis, severe   5) Lymphedema with significant LE edema and rash    Pt is now s/p Micra AV placement 4/25/24. Now extubated but remains on vasopressin gtt. CXR post Micra with good placement. R femoral access site benign. Device check by rep this morning WNL    Recommendations:  - WCK appt arranged for 5/2, appt details and post PPM instructions provided to patient  - Ultimately patient may warrant CRT-D placement noting conduction disease and cardiomyopathy with LVEF 30% and LBBB with QRSd 176. However, at this time she is not a candidate for CRT noting infectious risk due to LE swelling/cellulitis and ESRD on HD via temporary HDC  - S/p TAVR 4/24 w/ TTE 4/25: no paravalvular leak, LVEF 25% with global hypokinesis, elevated filling pressure, moderately enlarged RA/RV   - ID following for potential infectious etiology of fever. Likely allergic reaction to contrast. Off antibiotics. No further fevers noted  - Rest of care per CICU  - EP will sign off, please call back with questions/concerns

## 2024-04-26 NOTE — PROGRESS NOTE ADULT - SUBJECTIVE AND OBJECTIVE BOX
EVELYN LOPEZ  [id number]  Patient is a 73y old  Female who presents with a chief complaint of Syncope (26 Apr 2024 19:24)    HPI:  73F w HFrEF (EF 30%), mod AS, known LBBB, HTN, IDDM1, CKD, hypothyroidism, chronic lymphedema, suspected OHS/LELIA on 3L NC home O2 p/w 1 episode of syncope. Recent admission at [facility] (3/29-4/5) for ADHF and DUNCAN s/p diuresis, discharged with new home O2 3L NC suspecting OHS/LELIA pending outpatient w/u. Since discharge a week ago, she has been staying at home with   Pt had sob walking to car. Once in car, she was still breathing heavily. Partner noticed pt was not responding to verbal stimuli for 10-15sec and witnessed R arm jerking. Pt gained consciousness quickly without postictal symptoms. Pt went to Cardiologist Dr. Nathan who recommended pt to come to ED. No fever, cp, abd pain, n/v. Leg swelling is improved from prior. Pt on torsemide 40mg which she has been taking. Pt was discharged on 3LNC which she is currently on. Patient reports she did not take Farxiga that she was discharged on as she was concerned about side effects.     In ED, patient was found afebrile, hemodynamically stable, breathing well on 3L NC. Initial labs notable for mild hyponatremia, DUNCAN on CKD, elevated proBNP and elevated pCO2 both improved from prior. (12 Apr 2024 16:31)      Hospital Course:    24 HOUR EVENTS:    REVIEW OF SYSTEMS:    CONSTITUTIONAL: No weakness, fevers or chills  EYES/ENT: No visual changes;  No vertigo or throat pain   NECK: No pain or stiffness  RESPIRATORY: No cough, wheezing, hemoptysis; No shortness of breath  CARDIOVASCULAR: No chest pain or palpitations  GASTROINTESTINAL: No abdominal or epigastric pain. No nausea, vomiting, or hematemesis; No diarrhea or constipation. No melena or hematochezia.  GENITOURINARY: No dysuria, frequency or hematuria  NEUROLOGICAL: No numbness or weakness  SKIN: No itching, rashes      ICU Vital Signs Last 24 Hrs  T(C): 37.2 (26 Apr 2024 19:00), Max: 37.4 (26 Apr 2024 04:00)  T(F): 99 (26 Apr 2024 19:00), Max: 99.3 (26 Apr 2024 04:00)  HR: 74 (26 Apr 2024 20:00) (68 - 97)  BP: --  BP(mean): --  ABP: 100/41 (26 Apr 2024 20:00) (90/37 - 123/44)  ABP(mean): 60 (26 Apr 2024 20:00) (49 - 71)  RR: 10 (26 Apr 2024 20:00) (5 - 34)  SpO2: 100% (26 Apr 2024 20:00) (94% - 100%)    O2 Parameters below as of 26 Apr 2024 20:00  Patient On (Oxygen Delivery Method): nasal cannula  O2 Flow (L/min): 3          CVP(mm Hg): --  CO: --  CI: --  PA: --  PA(mean): --  PA(direct): --  PCWP: --  LA: --  RA: --  SVR: --  SVRI: --  PVR: --  PVRI: --  I&O's Summary    25 Apr 2024 07:01  -  26 Apr 2024 07:00  --------------------------------------------------------  IN: 1157.9 mL / OUT: 3398 mL / NET: -2240.1 mL    26 Apr 2024 07:01  -  26 Apr 2024 20:48  --------------------------------------------------------  IN: 1247 mL / OUT: 2445 mL / NET: -1198 mL        CAPILLARY BLOOD GLUCOSE    CAPILLARY BLOOD GLUCOSE      POCT Blood Glucose.: 190 mg/dL (26 Apr 2024 16:54)      PHYSICAL EXAM:  GENERAL: No acute distress, well-developed  HEAD:  Atraumatic, Normocephalic  EYES: EOMI, PERRLA, conjunctiva and sclera clear  NECK: Supple, no lymphadenopathy, no JVD  CHEST/LUNG: CTAB; No wheezes, rales, or rhonchi  HEART: Regular rate and rhythm. Normal S1/S2. No murmurs, rubs, or gallops  ABDOMEN: Soft, non-tender, non-distended; normal bowel sounds, no organomegaly  EXTREMITIES:  2+ peripheral pulses b/l, No clubbing, cyanosis, or edema  NEUROLOGY: A&O x 3, no focal deficits  SKIN: No rashes or lesions    ============================I/O===========================   I&O's Detail    25 Apr 2024 07:01  -  26 Apr 2024 07:00  --------------------------------------------------------  IN:    dextrose 5% + sodium chloride 0.45%: 450 mL    Enteral Tube Flush: 100 mL    Insulin: 41.5 mL    Propofol: 269.4 mL    Vasopressin: 111 mL    Vasopressin: 186 mL  Total IN: 1157.9 mL    OUT:    Norepinephrine: 0 mL    Other (mL): 3398 mL  Total OUT: 3398 mL    Total NET: -2240.1 mL      26 Apr 2024 07:01  -  26 Apr 2024 20:48  --------------------------------------------------------  IN:    IV PiggyBack: 500 mL    Oral Fluid: 600 mL    Vasopressin: 147 mL  Total IN: 1247 mL    OUT:    Other (mL): 2445 mL  Total OUT: 2445 mL    Total NET: -1198 mL        ============================ LABS =========================                        8.9    17.42 )-----------( 107      ( 26 Apr 2024 00:44 )             28.6     04-26    135  |  100  |  31<H>  ----------------------------<  132<H>  3.9   |  24  |  2.25<H>    Ca    7.7<L>      26 Apr 2024 00:44  Phos  2.8     04-26  Mg     2.2     04-26    TPro  5.6<L>  /  Alb  2.8<L>  /  TBili  0.6  /  DBili  x   /  AST  19  /  ALT  8<L>  /  AlkPhos  56  04-26                LIVER FUNCTIONS - ( 26 Apr 2024 00:44 )  Alb: 2.8 g/dL / Pro: 5.6 g/dL / ALK PHOS: 56 U/L / ALT: 8 U/L / AST: 19 U/L / GGT: x           PT/INR - ( 25 Apr 2024 08:40 )   PT: 15.6 sec;   INR: 1.43 ratio         PTT - ( 25 Apr 2024 08:40 )  PTT:25.6 sec  ABG - ( 26 Apr 2024 00:36 )  pH, Arterial: 7.35  pH, Blood: x     /  pCO2: 49    /  pO2: 114   / HCO3: 27    / Base Excess: 1.1   /  SaO2: 98.8              Blood Gas Arterial, Lactate: 1.6 mmol/L (04-26-24 @ 00:36)  Blood Gas Arterial, Lactate: 1.6 mmol/L (04-25-24 @ 20:56)  Blood Gas Arterial, Lactate: 1.5 mmol/L (04-25-24 @ 18:53)  Blood Gas Arterial, Lactate: 1.6 mmol/L (04-25-24 @ 17:14)  Blood Gas Arterial, Lactate: 1.8 mmol/L (04-25-24 @ 08:28)  Blood Gas Venous - Lactate: 2.3 mmol/L (04-25-24 @ 08:28)  Blood Gas Arterial, Lactate: 2.5 mmol/L (04-25-24 @ 02:44)  Blood Gas Venous - Lactate: 2.0 mmol/L (04-25-24 @ 02:44)  Blood Gas Arterial, Lactate: 2.4 mmol/L (04-25-24 @ 01:16)  Blood Gas Venous - Lactate: 2.6 mmol/L (04-25-24 @ 01:16)  Blood Gas Arterial, Lactate: 3.2 mmol/L (04-24-24 @ 18:02)    Urinalysis Basic - ( 26 Apr 2024 00:44 )    Color: x / Appearance: x / SG: x / pH: x  Gluc: 132 mg/dL / Ketone: x  / Bili: x / Urobili: x   Blood: x / Protein: x / Nitrite: x   Leuk Esterase: x / RBC: x / WBC x   Sq Epi: x / Non Sq Epi: x / Bacteria: x      ======================Micro/Rad/Cardio=================  Telemtry: Reviewed   EKG: Reviewed  CXR: Reviewed  Culture: Reviewed   Echo:   Cath:   ======================================================  PAST MEDICAL & SURGICAL HISTORY:  Hypertension      Diabetes      Lymphedema      H/O left bundle branch block      History of left bundle branch block (LBBB)      Systolic heart failure, chronic      Type 1 diabetes      H/O cataract  2020      History of surgical removal of meniscus of knee  left in 1971      Frozen shoulder  2000      H/O Achilles tendon repair  lengthened bilaterally, 2000      Fractured skull  1968  ====================ASSESSMENT ==============  74 y/o female w/ PMH: HFrEF (EF 30%), AS, LBBB, HTN, IDDM1, CKD, hypothyroidism, chronic lymphedema, suspected OHS/LELIA (3L NC home O2) p/w 1 episode of syncope most likely 2/2 moderate to severe AS, admitted to floors for TAVR w/u. Course complicated by suspected contrast induced allergic reaction (had CTA for TAVR eval) and contrast related renal failure (acute on chronic) requiring HD. Pt. went for TAVR on 2/24 which was c/b VT and vfib requiring shock and flash pulmonary edema causing AHRF requiring intubation. Currently undergoing HD for hyperkalemia and fluid overload. Currently on pressor support for possible vasoplegic shock, all of which require management in CICU.    NEURO:  - Extubated 4/26  - Currently AOx4    PULM  # Acute hypoxic respiratory failure most likely 2/2 flash pulmonary edema  # OHS/LELIA (on home oxygen)  - currently on 3L NC saturating well  - monitor status    CV  #Shock state  - initially suspected vasoplegia from sedation, now with persistent pressor requirement is there component of sepsis or hypovolemia from CRRT? less likely obstructive or cardiogenic  - currently on vasopressin 0.09  - lactate cleared  - will start empiric antibiotics with vancomycin for now to address possible underlying infection    #VT/vfib s/p shock  #Known LBBB with first degree AV block  - Monitor on tele  - Trend lytes    #2:1 AV block with 1st degree AV block  #Severe AS s/p TAVR  - s/p Micra 4/25, no longer requiring TVP (eventual candidate for CRT per EP?)  - c/w ASA  - c/w atorvastatin     # HFrEF  - EF 25%  - Holding GDMT while on pressors    /RENAL  # DUNCAN on CKD requiring HD c/b hyperkalemia  - CRRT  - Strict I/Os  - Trend BMP, lytes  - Avoid nephrotoxins    GI  - resumed diet    ENDO  # IDDM1  # DKA  - s/p Insulin drip  - transitioned to basal/bolus    # Hypothyroidism  - c/w home levothyroxine     SKIN  # Acute generalized exanthematous pustulosis due to drug  - c/w calamine  - c/w vaseline    # Psoriasis   # Lymphedema   - Elevate legs  - Compression stockings, ACE wrapping    ID  - Monitor WBC and for fevers    # Oral Candidiasis  - c/w nystatin       Patient requires continuous monitoring with bedside rhythm monitoring, pulse ox monitoring, and intermittent blood gas analysis. Care plan discussed with ICU care team. Patient remained critical and at risk for life threatening decompensation.  Patient seen, examined and plan discussed with CCU team during rounds.     I have personally provided ____ minutes of critical care time excluding time spent on separate procedures, in addition to initial critical care time provided by the CICU Attending, Dr. Quintero.     By signing my name below, I, Susan Lockett, attest that this documentation has been prepared under the direction and in the presence of TABATHA Graham.  Electronically signed: Madison Pires, 04-26-24 @ 20:49    I, Georgia Glenda , personally performed the services described in this documentation. all medical record entries made by the scribe were at my direction and in my presence. I have reviewed the chart and agree that the record reflects my personal performance and is accurate and complete  Electronically signed: TABATHA Graham.       EVELYN LOPEZ  MRN-[id number]  Patient is a 73y old  Female who presents with a chief complaint of Syncope (26 Apr 2024 19:24)    HPI:  73F w HFrEF (EF 30%), mod AS, known LBBB, HTN, IDDM1, CKD, hypothyroidism, chronic lymphedema, suspected OHS/LELIA on 3L NC home O2 p/w 1 episode of syncope. Recent admission at [facility] (3/29-4/5) for ADHF and DUNCAN s/p diuresis, discharged with new home O2 3L NC suspecting OHS/LELIA pending outpatient w/u. Since discharge a week ago, she has been staying at home with   Pt had sob walking to car. Once in car, she was still breathing heavily. Partner noticed pt was not responding to verbal stimuli for 10-15sec and witnessed R arm jerking. Pt gained consciousness quickly without postictal symptoms. Pt went to Cardiologist Dr. Nathan who recommended pt to come to ED. No fever, cp, abd pain, n/v. Leg swelling is improved from prior. Pt on torsemide 40mg which she has been taking. Pt was discharged on 3LNC which she is currently on. Patient reports she did not take Farxiga that she was discharged on as she was concerned about side effects.     In ED, patient was found afebrile, hemodynamically stable, breathing well on 3L NC. Initial labs notable for mild hyponatremia, DUNCAN on CKD, elevated proBNP and elevated pCO2 both improved from prior. (12 Apr 2024 16:31)    24 HOUR EVENTS:  - started on Vanco for rising leukocytosis     ICU Vital Signs Last 24 Hrs  T(C): 37.2 (26 Apr 2024 19:00), Max: 37.4 (26 Apr 2024 04:00)  T(F): 99 (26 Apr 2024 19:00), Max: 99.3 (26 Apr 2024 04:00)  HR: 74 (26 Apr 2024 20:00) (68 - 97)  ABP: 100/41 (26 Apr 2024 20:00) (90/37 - 123/44)  ABP(mean): 60 (26 Apr 2024 20:00) (49 - 71)  RR: 10 (26 Apr 2024 20:00) (5 - 34)  SpO2: 100% (26 Apr 2024 20:00) (94% - 100%)    O2 Parameters below as of 26 Apr 2024 20:00  Patient On (Oxygen Delivery Method): nasal cannula  O2 Flow (L/min): 3    I&O's Summary  25 Apr 2024 07:01  -  26 Apr 2024 07:00  --------------------------------------------------------  IN: 1157.9 mL / OUT: 3398 mL / NET: -2240.1 mL    26 Apr 2024 07:01  -  26 Apr 2024 20:48  --------------------------------------------------------  IN: 1247 mL / OUT: 2445 mL / NET: -1198 mL    CAPILLARY BLOOD GLUCOSE  POCT Blood Glucose.: 190 mg/dL (26 Apr 2024 16:54)    PHYSICAL EXAM:  GENERAL: NAD  NECK: Supple  CHEST/LUNG: CTAB  HEART: Regular rate and rhythm. Normal S1/S2  ABDOMEN: Soft, non-tender, non-distended  EXTREMITIES:  2+ peripheral pulses b/l, No clubbing, cyanosis, or edema  NEUROLOGY: A&O x 3    ============================I/O===========================   I&O's Detail    25 Apr 2024 07:01  -  26 Apr 2024 07:00  --------------------------------------------------------  IN:    dextrose 5% + sodium chloride 0.45%: 450 mL    Enteral Tube Flush: 100 mL    Insulin: 41.5 mL    Propofol: 269.4 mL    Vasopressin: 111 mL    Vasopressin: 186 mL  Total IN: 1157.9 mL    OUT:    Norepinephrine: 0 mL    Other (mL): 3398 mL  Total OUT: 3398 mL    Total NET: -2240.1 mL    26 Apr 2024 07:01  -  26 Apr 2024 20:48  --------------------------------------------------------  IN:    IV PiggyBack: 500 mL    Oral Fluid: 600 mL    Vasopressin: 147 mL  Total IN: 1247 mL    OUT:    Other (mL): 2445 mL  Total OUT: 2445 mL    Total NET: -1198 mL    =========================== LABS =========================                     8.9    17.42 )-----------( 107      ( 26 Apr 2024 00:44 )             28.6     04-26    135  |  100  |  31<H>  ----------------------------<  132<H>  3.9   |  24  |  2.25<H>    Ca    7.7<L>      26 Apr 2024 00:44  Phos  2.8     04-26  Mg     2.2     04-26    TPro  5.6<L>  /  Alb  2.8<L>  /  TBili  0.6  /  DBili  x   /  AST  19  /  ALT  8<L>  /  AlkPhos  56  04-26    LIVER FUNCTIONS - ( 26 Apr 2024 00:44 )  Alb: 2.8 g/dL / Pro: 5.6 g/dL / ALK PHOS: 56 U/L / ALT: 8 U/L / AST: 19 U/L / GGT: x           PT/INR - ( 25 Apr 2024 08:40 )   PT: 15.6 sec;   INR: 1.43 ratio       PTT - ( 25 Apr 2024 08:40 )  PTT:25.6 sec  ABG - ( 26 Apr 2024 00:36 )  pH, Arterial: 7.35  pH, Blood: x     /  pCO2: 49    /  pO2: 114   / HCO3: 27    / Base Excess: 1.1   /  SaO2: 98.8      Blood Gas Arterial, Lactate: 1.6 mmol/L (04-26-24 @ 00:36)  Blood Gas Arterial, Lactate: 1.6 mmol/L (04-25-24 @ 20:56)  Blood Gas Arterial, Lactate: 1.5 mmol/L (04-25-24 @ 18:53)  Blood Gas Arterial, Lactate: 1.6 mmol/L (04-25-24 @ 17:14)  Blood Gas Arterial, Lactate: 1.8 mmol/L (04-25-24 @ 08:28)  Blood Gas Venous - Lactate: 2.3 mmol/L (04-25-24 @ 08:28)  Blood Gas Arterial, Lactate: 2.5 mmol/L (04-25-24 @ 02:44)  Blood Gas Venous - Lactate: 2.0 mmol/L (04-25-24 @ 02:44)  Blood Gas Arterial, Lactate: 2.4 mmol/L (04-25-24 @ 01:16)  Blood Gas Venous - Lactate: 2.6 mmol/L (04-25-24 @ 01:16)  Blood Gas Arterial, Lactate: 3.2 mmol/L (04-24-24 @ 18:02)    Urinalysis Basic - ( 26 Apr 2024 00:44 )    Color: x / Appearance: x / SG: x / pH: x  Gluc: 132 mg/dL / Ketone: x  / Bili: x / Urobili: x   Blood: x / Protein: x / Nitrite: x   Leuk Esterase: x / RBC: x / WBC x   Sq Epi: x / Non Sq Epi: x / Bacteria: x    ======================================================  PAST MEDICAL & SURGICAL HISTORY:  Hypertension    Diabetes    Lymphedema    H/O left bundle branch block    History of left bundle branch block (LBBB)    Systolic heart failure, chronic    Type 1 diabetes    H/O cataract 2020    History of surgical removal of meniscus of knee, left in 1971    Frozen shoulder, 2000    H/O Achilles tendon repair, lengthened bilaterally, 2000    Fractured skull, 1968    ====================ASSESSMENT ==============  74 y/o female w/ PMH: HFrEF (EF 30%), AS, LBBB, HTN, IDDM1, CKD, hypothyroidism, chronic lymphedema, suspected OHS/LELIA (3L NC home O2) p/w 1 episode of syncope most likely 2/2 moderate to severe AS, admitted to floors for TAVR w/u. Course complicated by suspected contrast induced allergic reaction (had CTA for TAVR eval) and contrast related renal failure (acute on chronic) requiring HD. Pt. went for TAVR on 2/24 which was c/b VT/VF requiring shock and flash pulmonary edema causing AHRF requiring intubation. Currently undergoing HD for hyperkalemia and fluid overload. Currently on pressor support for possible vasoplegic shock, requiring management in CICU.    NEURO:  - Extubated 4/26  - Currently AOx4    PULM  # Acute hypoxic respiratory failure most likely 2/2 flash pulmonary edema  # OHS/LELIA (on home oxygen)  - currently on 3L NC saturating well  - monitor status    CV  #Shock state  - initially suspected vasoplegia from sedation, now with persistent pressor requirement is there component of sepsis or hypovolemia from CRRT? less likely obstructive or cardiogenic  - currently on vasopressin 0.09  - lactate cleared  - will start empiric antibiotics with vancomycin for now to address possible underlying infection    #VT/vfib s/p shock  #Known LBBB with first degree AV block  - Monitor on tele  - Trend lytes    #2:1 AV block with 1st degree AV block  #Severe AS s/p TAVR  - s/p Micra 4/25, no longer requiring TVP (eventual candidate for CRT per EP?)  - c/w ASA  - c/w atorvastatin     # HFrEF  - EF 25%  - Holding GDMT while on pressors    /RENAL  # DUNCAN on CKD requiring HD c/b hyperkalemia  - CRRT  - Strict I/Os  - Trend BMP, lytes  - Avoid nephrotoxins    GI  - resumed diet    ENDO  # IDDM1  # DKA  - s/p Insulin drip  - transitioned to basal/bolus    # Hypothyroidism  - c/w home levothyroxine     SKIN  # Acute generalized exanthematous pustulosis due to drug  - c/w calamine  - c/w vaseline    # Psoriasis   # Lymphedema   - Elevate legs  - Compression stockings, ACE wrapping    ID  Prelim BCx drawn 4/25 Gram + cocci in clusters in aerobic bottle  - Monitor WBC and for fevers    # Oral Candidiasis  - c/w nystatin       Patient requires continuous monitoring with bedside rhythm monitoring, pulse ox monitoring, and intermittent blood gas analysis. Care plan discussed with ICU care team. Patient remained critical and at risk for life threatening decompensation.  Patient seen, examined and plan discussed with CCU team during rounds.     I have personally provided 35 minutes of critical care time excluding time spent on separate procedures, in addition to initial critical care time provided by the CICU Attending, Dr. Quintero.     By signing my name below, I, Susan Lockett, attest that this documentation has been prepared under the direction and in the presence of TABATHA Graham.  Electronically signed: Madison Pires, 04-26-24 @ 20:49    IG[place] , personally performed the services described in this documentation. all medical record entries made by the heraclioibe were at my direction and in my presence. I have reviewed the chart and agree that the record reflects my personal performance and is accurate and complete  Electronically signed: TABATHA Graham.

## 2024-04-26 NOTE — PROGRESS NOTE ADULT - ATTENDING COMMENTS
Agree with Dr. Astudillo's assessment and plan as outlined above. Reviewed all pertinent labs, and imaging studies. Modifications made as indicated above. Following this 73 F for adrenal adenoma and T1D management. For adrenal hormone workup, patient found to have unsuppressed cortisol with 1 mg DST with appropriate dexamethasone level. Pending 24 hour urine cortisol for further evaluation. Plasma metanephrine negative. Aldosterone elevated, renin level elevated, so rules out primary aldosteronism. For T1D, A1C 7.4. Increase basal/bolus as above. Rest of the plan as above. Complex case, high risk patient, high-level decision-making, requiring ICU level of care.

## 2024-04-26 NOTE — PROGRESS NOTE ADULT - NUTRITIONAL ASSESSMENT
This patient has been assessed with a concern for Malnutrition and has been determined to have a diagnosis/diagnoses of Morbid obesity (BMI > 40).    This patient is being managed with:   Diet Consistent Carbohydrate w/Evening Snack-  Entered: Apr 26 2024  6:07AM

## 2024-04-27 NOTE — PROGRESS NOTE ADULT - SUBJECTIVE AND OBJECTIVE BOX
MR#[id number]  PATIENT NAME:JOHNATHAN LOPEZ    DATE OF SERVICE: 04-27-24 @ 07:59  Patient was seen and examined by Cuco Tubbs MD on    04-27-24 @ 07:59 .  Interim events noted.Consultant notes ,Labs,Telemetry reviewed by me         Covering for [facility] -Ozzie Kimble, Howard, Carlos Luong, Herman Alston  Office Number: [phone]       HOSPITAL COURSE: HPI:  73F w HFrEF (EF 30%), mod AS, known LBBB, HTN, IDDM1, CKD, hypothyroidism, chronic lymphedema, suspected OHS/LELIA on 3L NC home O2 p/w 1 episode of syncope. Recent admission at [facility] (3/29-4/5) for ADHF and DUNCAN s/p diuresis, discharged with new home O2 3L NC suspecting OHS/LELIA pending outpatient w/u. Since discharge a week ago, she has been staying at home with   Pt had sob walking to car. Once in car, she was still breathing heavily. Partner noticed pt was not responding to verbal stimuli for 10-15sec and witnessed R arm jerking. Pt gained consciousness quickly without postictal symptoms. Pt went to Cardiologist Dr. Nathan who recommended pt to come to ED. No fever, cp, abd pain, n/v. Leg swelling is improved from prior. Pt on torsemide 40mg which she has been taking. Pt was discharged on 3LNC which she is currently on. Patient reports she did not take Farxiga that she was discharged on as she was concerned about side effects.     In ED, patient was found afebrile, hemodynamically stable, breathing well on 3L NC. Initial labs notable for mild hyponatremia, DUNCAN on CKD, elevated proBNP and elevated pCO2 both improved from prior. (12 Apr 2024 16:31)          INTERIM EVENTS:Patient seen at bedside ,interim events noted.  Pt. went for TAVR on 2/24 which was c/b VT/VF requiring shock and flash pulmonary edema causing AHRF requiring intubation. Extubated 4/26  Currently undergoing HD for hyperkalemia and fluid overload.   Currently on pressor support for possible vasoplegic shock, requiring management in CICU.  - s/p Micra 4/25  -Remains in Sinus rhythm on vasopressin gtt        PMH -reviewed admission note, no change since admission        MEDICATIONS  (STANDING):  ascorbic acid 500 milliGRAM(s) Oral daily  aspirin  chewable 81 milliGRAM(s) Oral daily  atorvastatin 80 milliGRAM(s) Oral at bedtime  calamine/zinc oxide Lotion 1 Application(s) Topical three times a day  chlorhexidine 2% Cloths 1 Application(s) Topical daily  CRRT Treatment    <Continuous>  ferrous    sulfate 325 milliGRAM(s) Oral two times a day  heparin   Injectable 5000 Unit(s) SubCutaneous every 8 hours  heparin  Infusion Syringe 300 Unit(s)/Hr (0.6 mL/Hr) CRRT <Continuous>  insulin glargine Injectable (LANTUS) 24 Unit(s) SubCutaneous at bedtime  insulin lispro (ADMELOG) corrective regimen sliding scale   SubCutaneous three times a day before meals  insulin lispro (ADMELOG) corrective regimen sliding scale   SubCutaneous at bedtime  insulin lispro Injectable (ADMELOG) 8 Unit(s) SubCutaneous three times a day before meals  levothyroxine 75 MICROGram(s) Oral daily  multivitamin 1 Tablet(s) Oral daily  nystatin    Suspension 780858 Unit(s) Oral four times a day  petrolatum white Ointment 1 Application(s) Topical three times a day  PrismaSATE Dialysate BGK 4 / 2.5 5000 milliLiter(s) (1400 mL/Hr) CRRT <Continuous>  PrismaSOL Filtration BGK 0 / 2.5 5000 milliLiter(s) (1000 mL/Hr) CRRT <Continuous>  PrismaSOL Filtration BGK 4 / 2.5 5000 milliLiter(s) (300 mL/Hr) CRRT <Continuous>  senna 2 Tablet(s) Oral at bedtime  vancomycin  IVPB 1000 milliGRAM(s) IV Intermittent every 12 hours  vasopressin Infusion 0.06 Unit(s)/Min (9 mL/Hr) IV Continuous <Continuous>    MEDICATIONS  (PRN):  polyethylene glycol 3350 17 Gram(s) Oral two times a day PRN Constipation            REVIEW OF SYSTEMS:  Constitutional: [ ] fever, [ ]weight loss,  [ ]fatigue [ ]weight gain  Eyes: [ ] visual changes  Respiratory: [ ]shortness of breath;  [ ] cough, [ ]wheezing, [ ]chills, [ ]hemoptysis  Cardiovascular: [ ] chest pain, [ ]palpitations, [ ]dizziness,  [ ]leg swelling[ ]orthopnea[ ]PND  Gastrointestinal: [ ] abdominal pain, [ ]nausea, [ ]vomiting,  [ ]diarrhea [ ]Constipation [ ]Melena  Genitourinary: [ ] dysuria, [ ] hematuria [ ]De [place] Garza  Neurologic: [ ] headaches [ ] tremors[ ]weakness [ ]Paralysis Right[ ] Left[ ]  Skin: [ ] itching, [ ]burning, [ ] rashes  Endocrine: [ ] heat or cold intolerance  Musculoskeletal: [ ] joint pain or swelling; [ ] muscle, back, or extremity pain  Psychiatric: [ ] depression, [ ]anxiety, [ ]mood swings, or [ ]difficulty sleeping  Hematologic: [ ] easy bruising, [ ] bleeding gums    [ ] All remaining systems negative except as per above.   [ ]Unable to obtain.  [x] No change in ROS since admission      Vital Signs Last 24 Hrs  T(C): 36.4 (27 Apr 2024 04:00), Max: 37.3 (26 Apr 2024 08:00)  T(F): 97.6 (27 Apr 2024 04:00), Max: 99.1 (26 Apr 2024 08:00)  HR: 82 (27 Apr 2024 07:30) (67 - 94)  BP: --102/40  RR: 18 (27 Apr 2024 07:30) (5 - 41)  SpO2: 100% (27 Apr 2024 07:30) (83% - 100%)    Parameters below as of 27 Apr 2024 07:30  Patient On (Oxygen Delivery Method): nasal cannula  O2 Flow (L/min): 3    I&O's Summary    26 Apr 2024 07:01  -  27 Apr 2024 07:00  --------------------------------------------------------  IN: 1858 mL / OUT: 3882 mL / NET: -2024 mL        PHYSICAL EXAM:  General: No acute distress BMI-32  HEENT: EOMI, PERRL  Neck: Supple, [ ] JVD  Lungs: Equal air entry bilaterally; [ ] rales [ ] wheezing [ ] rhonchi  Heart: Regular rate and rhythm; [x ] murmur   2/6 [ x] systolic [ ] diastolic [ ] radiation[ ] rubs [ ]  gallops  Abdomen: Nontender, bowel sounds present  Extremities: No clubbing, cyanosis, [ ] edema [ ]Pulses  equal and intact  Nervous system:  Alert & Oriented X3, no focal deficits  Psychiatric: Normal affect  Skin: No rashes or lesions    LABS:  04-27    134<L>  |  101  |  21  ----------------------------<  142<H>  3.6   |  25  |  1.84<H>    Ca    7.6<L>      27 Apr 2024 00:34  Phos  2.5     04-27  Mg     2.3     04-27    TPro  5.3<L>  /  Alb  2.7<L>  /  TBili  0.5  /  DBili  x   /  AST  16  /  ALT  8<L>  /  AlkPhos  56  04-27    Creatinine Trend: 1.84<--, 2.25<--, 2.23<--, 2.46<--, 2.47<--, 2.96<--                        7.8    9.96  )-----------( 74       ( 27 Apr 2024 00:34 )             25.9     PT/INR - ( 25 Apr 2024 08:40 )   PT: 15.6 sec;   INR: 1.43 ratio         PTT - ( 25 Apr 2024 08:40 )  PTT:25.6 sec         TTE W or WO Ultrasound Enhancing Agent (04.25.24 @ 09:27) >  CONCLUSIONS:      1. Left ventricular cavity is moderately dilated. Left ventricular systolic function is severely decreased with an ejection fraction of 25 % by Nunes's method of disks. Global left ventricular hypokinesis.   2. There is no evidence of a left ventricular thrombus.   3. There is moderate (grade 2) left ventricular diastolic dysfunction, withelevated filling pressure.   4. There is calcification of the mitral valve annulus.   5. A Benitez Bakari (TAVR) valve replacement is present in the aortic position The prosthetic valve is well seated. No paravalvular regurgitation.   6. Moderately enlarged right ventricular cavity size, with normal wall thickness, and normal systolic function.   7. The right atrium is moderately dilated.   8. Left pleural effusion noted.   9. No pericardial effusion seen.  10. Compared to the transthoracic echocardiogram performed on 4/24/2024, there have been no significant interval changes.    12 Lead ECG (04.25.24 @ 02:01) >   SINUS BRADYCARDIA WITH 1ST DEGREE A-V BLOCK WITH BLOCKED PREMATURE ATRIAL COMPLEXES  LEFT AXIS DEVIATION  LEFT BUNDLE BRANCH BLOCK    Xray Chest 1 View- PORTABLE-Urgent (Xray Chest 1 View- PORTABLE-Urgent .) (04.25.24 @ 13:26) >  FINDINGS:  Endotracheal tube terminates above the inocencio. Enteric tube courses below  the diaphragm but the tip is not visualized. Right IJ central venous  catheter with tip in the SVC. Left IJ central venous catheter with tip in   the left brachiocephalic vein.  The heart is unchanged in size. Status post TAVR. Micra pacemaker.  Patchy perihilar opacities, likely pulmonary edema.  Trace left pleural effusion.  No pneumothorax.    IMPRESSION:  Pulmonary edema with trace left pleural effusion.

## 2024-04-27 NOTE — PROGRESS NOTE ADULT - ATTENDING COMMENTS
73 F w/ bicuspid severe AS s/p TAVR w/ course c/b bradycardia.    Neuro: Off sedation, baseline mental status  Resp: Extubated, weaned to NC. Continue to wean  CVS: Vasoplegia requiring vaso, slowly weaning. s/p micrappm and TAVR  Renal: c/w CVVH for aggressive volume removal.  GI: PO diet as tolerated, bowel regimen PRN  ID: Leukocytosis resolved, cultures with MR staph epi. c/w vanc. ID eval. Change lines   Endo:  T1DM, c/w lantus and pre-meals, ISS  Heme: HSQ  R subclavian shiley 4/18

## 2024-04-27 NOTE — PROGRESS NOTE ADULT - TIME BILLING
- Review of records, telemetry, vital signs and daily labs.   - General and cardiovascular physical examination.  - Generation of cardiovascular treatment plan.  - Coordination of care.      Patient was seen and examined by me on 04/27/2024,interim events noted,labs and radiology studies reviewed.  Cuco Tubbs MD,FACC.  [address] [place], [place] [id number].  [phone]

## 2024-04-27 NOTE — PROCEDURE NOTE - NSANESTHESIA_GEN_A_CORE
no anesthesia administered
no anesthesia administered
1% lidocaine
1% lidocaine
no anesthesia administered
2% lidocaine

## 2024-04-27 NOTE — PROCEDURE NOTE - NSPOSTPRCRAD_GEN_A_CORE
central line located in the superior vena cava/depth of insertion/line adjusted to depth of insertion
CXR ordered
chest radiograph
central line located in the superior vena cava/no pneumothorax/post-procedure radiography performed

## 2024-04-27 NOTE — PROCEDURE NOTE - NSPOSTCAREGUIDE_GEN_A_CORE
Verbal/written post procedure instructions were given to patient/caregiver
Care for catheter as per unit/ICU protocols
Care for catheter as per unit/ICU protocols
Verbal/written post procedure instructions were given to patient/caregiver

## 2024-04-27 NOTE — PROGRESS NOTE ADULT - ASSESSMENT
73F w HFrEF (EF 30%), mod AS, known LBBB, HTN, IDDM1, CKD, hypothyroidism, chronic lymphedema, suspected OHS/LELIA on 3L NC home O2 p/w 1 episode of syncope. Recent admission at [facility] (3/29-4/5) for ADHF and DUNCAN s/p diuresis, discharged with new home O2 3L NC suspecting OHS/LELIA pending outpatient w/u. Since discharge a week ago, she has been staying at home with   Pt had sob walking to car. Once in car, she was still breathing heavily. Partner noticed pt was not responding to verbal stimuli for 10-15sec and witnessed R arm jerking. Pt gained consciousness quickly without postictal symptoms. Pt went to Cardiologist Dr. Nathan who recommended pt to come to ED. No fever, cp, abd pain, n/v. Leg swelling is improved from prior. Pt on torsemide 40mg which she has been taking. Pt was discharged on 3LNC which she is currently on. Patient reports she did not take Farxiga that she was discharged on as she was concerned about side effects.     In ED, patient was found afebrile, hemodynamically stable, breathing well on 3L NC. Initial labs notable for mild hyponatremia, DUNCAN on CKD, elevated proBNP and elevated pCO2 both improved from prior.  pt also noticed with abn creatinine      1- CKD IV  with DUNCAN   2- CHF   3- lymphedema   4- severe AS  s/p tavr 4/24  5- syncope   6- hyperkalemia  7- hypotension         suspect ATN from hypotension along with contrast nephropathy   creatinine worsening requiring renal replacement therapy, HD initiated 4/18  cont CRRT  bfr 200 heparin 300 unit/hr to circuit and cont  dfr 1400 fluid removal 175 cc/hr   see new orders   structural heart team following, s/p TAVR  derm following for rash  pressor support  with vasopressin   d.w CCU team

## 2024-04-27 NOTE — PROCEDURE NOTE - NSSITEPREP_SKIN_A_CORE
chlorhexidine/Adherence to aseptic technique: hand hygiene prior to donning barriers (gown, gloves), don cap and mask, sterile drape over patient
chlorhexidine

## 2024-04-27 NOTE — PROGRESS NOTE ADULT - ASSESSMENT
73F w HFrEF (EF 30%), mod AS, known LBBB, HTN, IDDM1, CKD, hypothyroidism, chronic lymphedema, p/w 1 episode of syncope with R arm jerking.     #Syncope-Aortic Stenosis  - Known Aortic Stenosis likely Severe in setting of decreased LVEF  - s/p tavr on 4/24 c/b VT -> shock -> VFib -> shock  - hypoxic/congested, and was intubated, now extubated on 4/25 in the evening, on 3L NC   - overnight on 4/25 with worsening bradycardia, and now s/p TVP placement followed by AV Micra placement with removal of TVP  -Remains in Sinus Rhythm  - cont pressors as needed, currently on Vaso  - cvvhd as per Renal    #Chronic Systolic HF (EF 30%), mod to severe AS, HTN, LBBB, Lymphedema   - Has known systolic HF and AS.    - Repeat TTE showed EF 30%, mild-mod LVH, mildly reduced RVF, mod-severe AS (.61 cmsq), mod pHTN  - On home Torsemide 20 mg daily, Eplerenone 25 mg daily, and Toprol  mg daily  - Compliant with all meds including Torsemide, though, dose was reduced to Furosemide 40mg once daily. All on hold in the setting of shock  - proBNP 51K from 80k.   - GDMT on hold in the setting of shock  - Was on bumex gtt but then overnight 4/15 developed a fever to 102F and became hypotensive overnight.  - then started on HD    #LBBB  Known LBBB  Noted intermittent Wenckebach on telemetry with bradycardia and 2:1 AV conduction  - EP has been consulted.   -s/p tvp placement followed by AV Micra placement and removal of TVP  -Sinus Rhythm    On Vanco for leukocytosis    - very high risk of decompensation

## 2024-04-27 NOTE — PROGRESS NOTE ADULT - SUBJECTIVE AND OBJECTIVE BOX
Patient is a 73y old  Female who presents with a chief complaint of Syncope (2024 15:17)    INTERVAL HISTORY:  -     SUBJECTIVE  - Patient seen and evaluated at bedside.     MEDICATIONS:  MEDICATIONS  (STANDING):  ascorbic acid 500 milliGRAM(s) Oral daily  aspirin  chewable 81 milliGRAM(s) Oral daily  atorvastatin 80 milliGRAM(s) Oral at bedtime  calamine/zinc oxide Lotion 1 Application(s) Topical three times a day  chlorhexidine 2% Cloths 1 Application(s) Topical daily  chlorhexidine 4% Liquid 1 Application(s) Topical <User Schedule>  CRRT Treatment    <Continuous>  ferrous    sulfate 325 milliGRAM(s) Oral two times a day  heparin   Injectable 5000 Unit(s) SubCutaneous every 8 hours  heparin  Infusion Syringe 300 Unit(s)/Hr (0.6 mL/Hr) CRRT <Continuous>  insulin glargine Injectable (LANTUS) 21 Unit(s) SubCutaneous at bedtime  insulin lispro (ADMELOG) corrective regimen sliding scale   SubCutaneous Before meals and at bedtime  insulin lispro Injectable (ADMELOG) 4 Unit(s) SubCutaneous three times a day before meals  levothyroxine 75 MICROGram(s) Oral daily  multivitamin 1 Tablet(s) Oral daily  nystatin    Suspension 671121 Unit(s) Oral four times a day  petrolatum white Ointment 1 Application(s) Topical three times a day  potassium phosphate / sodium phosphate Powder (PHOS-NaK) 1 Packet(s) Oral three times a day with meals  PrismaSATE Dialysate BGK 4 / 2.5 5000 milliLiter(s) (1400 mL/Hr) CRRT <Continuous>  PrismaSOL Filtration BGK 0 / 2.5 5000 milliLiter(s) (1000 mL/Hr) CRRT <Continuous>  PrismaSOL Filtration BGK 4 / 2.5 5000 milliLiter(s) (300 mL/Hr) CRRT <Continuous>  senna 2 Tablet(s) Oral at bedtime  vancomycin  IVPB 1250 milliGRAM(s) IV Intermittent every 12 hours  vasopressin Infusion 0.01 Unit(s)/Min (1.5 mL/Hr) IV Continuous <Continuous>    MEDICATIONS  (PRN):  polyethylene glycol 3350 17 Gram(s) Oral two times a day PRN Constipation    OBJECTIVE:  ICU Vital Signs Last 24 Hrs  T(C): 36.9 (2024 16:00), Max: 37.1 (2024 00:00)  T(F): 98.4 (2024 16:00), Max: 98.8 (2024 00:00)  HR: 91 (2024 19:15) (67 - 100)  ABP: 110/44 (2024 19:15) (85/36 - 113/46)  ABP(mean): 66 (2024 19:15) (51 - 71)  RR: 17 (2024 19:15) (8 - 41)  SpO2: 100% (2024 19:15) (83% - 100%)    O2 Parameters below as of 2024 19:00  Patient On (Oxygen Delivery Method): nasal cannula  O2 Flow (L/min): 3    CAPILLARY BLOOD GLUCOSE  POCT Blood Glucose.: 94 mg/dL (2024 16:43)  POCT Blood Glucose.: 96 mg/dL (2024 11:36)  POCT Blood Glucose.: 139 mg/dL (2024 07:40)  POCT Blood Glucose.: 157 mg/dL (2024 21:45)    CAPILLARY BLOOD GLUCOSE  POCT Blood Glucose.: 94 mg/dL (2024 16:43)    I&O's Summary    2024 07:01  -  2024 07:00  --------------------------------------------------------  IN: 1870 mL / OUT: 3882 mL / NET: - mL    2024 07:01  -  2024 19:49  --------------------------------------------------------  IN: 1028.5 mL / OUT: 1706 mL / NET: -677.5 mL      Daily     Daily Weight in k.2 (2024 05:45)    PHYSICAL EXAM:  General: NAD, well-groomed, well-developed  Eyes: Conjunctiva and sclera clear  ENMT: Moist mucous membranes  Neck: Supple  Chest: Clear to auscultation bilaterally; no rales, rhonchi, or wheezing  Heart: Regular rate and rhythm; normal S1 and S2; no murmurs, rubs, or gallops  Abd: Soft, nontender, nondistended  Nervous System: AAOX3  Ext: no peripheral LE edema bilaterally  Lines/Tubes: IV peripheral    LABS:  ABG - ( 2024 00:17 )  pH, Arterial: 7.33  pH, Blood: x     /  pCO2: 50    /  pO2: 158   / HCO3: 26    / Base Excess: 0.2   /  SaO2: 99.2                                    7.8    9.96  )-----------( 74       ( 2024 00:34 )             25.9     -    132<L>  |  100  |  18  ----------------------------<  91  3.9   |  25  |  1.62<H>    Ca    7.6<L>      2024 12:19  Phos  2.1       Mg     2.2         TPro  5.6<L>  /  Alb  2.7<L>  /  TBili  0.5  /  DBili  x   /  AST  16  /  ALT  8<L>  /  AlkPhos  57      LIVER FUNCTIONS - ( 2024 12:19 )  Alb: 2.7 g/dL / Pro: 5.6 g/dL / ALK PHOS: 57 U/L / ALT: 8 U/L / AST: 16 U/L / GGT: x                   Urinalysis Basic - ( 2024 12:19 )    Color: x / Appearance: x / SG: x / pH: x  Gluc: 91 mg/dL / Ketone: x  / Bili: x / Urobili: x   Blood: x / Protein: x / Nitrite: x   Leuk Esterase: x / RBC: x / WBC x   Sq Epi: x / Non Sq Epi: x / Bacteria: x          Plan:  ====================== NEUROLOGY=====================  - continue to monitor mental status as per protocol     ==================== RESPIRATORY======================  - continue to monitor SpO2 with goal >94%    Mechanical Vent:     ====================CARDIOVASCULAR==================      ===================== RENAL =========================  - Continue monitoring urine output, lytes, SCr/ BUN  - replete lytes prn with goal K >4 and Mg >2    =============== GASTROINTESTINAL===================      ===================ENDO====================      ===================HEMATOLOGIC/ONC ===================  - Monitor H/H and plts  - DVT PPX:     ==================INFECTIOUS DISEASE================  - monitor and trend WBC and temperature curve     Dispo:    Patient requires continuous monitoring with bedside rhythm monitoring, arterial line, pulse oximetry, ventilator monitoring and intermittent blood gas analysis.  Care plan discussed with ICU care team.  I have spent 35 minutes providing critical care, in addition to initial critical time provided by CICU attending Dr. Quintero, re-evaluated multiple times during the day.    Georgia Doshi PA-C Patient is a 73y old  Female who presents with a chief complaint of Syncope (2024 15:17)    INTERVAL HISTORY:  - CVV increased to 200cc/hr  - remains on vaso .01, titrating to SBP >95    SUBJECTIVE  - Patient seen and evaluated at bedside.     MEDICATIONS:  MEDICATIONS  (STANDING):  ascorbic acid 500 milliGRAM(s) Oral daily  aspirin  chewable 81 milliGRAM(s) Oral daily  atorvastatin 80 milliGRAM(s) Oral at bedtime  calamine/zinc oxide Lotion 1 Application(s) Topical three times a day  chlorhexidine 2% Cloths 1 Application(s) Topical daily  chlorhexidine 4% Liquid 1 Application(s) Topical <User Schedule>  CRRT Treatment    <Continuous>  ferrous    sulfate 325 milliGRAM(s) Oral two times a day  heparin   Injectable 5000 Unit(s) SubCutaneous every 8 hours  heparin  Infusion Syringe 300 Unit(s)/Hr (0.6 mL/Hr) CRRT <Continuous>  insulin glargine Injectable (LANTUS) 21 Unit(s) SubCutaneous at bedtime  insulin lispro (ADMELOG) corrective regimen sliding scale   SubCutaneous Before meals and at bedtime  insulin lispro Injectable (ADMELOG) 4 Unit(s) SubCutaneous three times a day before meals  levothyroxine 75 MICROGram(s) Oral daily  multivitamin 1 Tablet(s) Oral daily  nystatin    Suspension 746272 Unit(s) Oral four times a day  petrolatum white Ointment 1 Application(s) Topical three times a day  potassium phosphate / sodium phosphate Powder (PHOS-NaK) 1 Packet(s) Oral three times a day with meals  PrismaSATE Dialysate BGK 4 / 2.5 5000 milliLiter(s) (1400 mL/Hr) CRRT <Continuous>  PrismaSOL Filtration BGK 0 / 2.5 5000 milliLiter(s) (1000 mL/Hr) CRRT <Continuous>  PrismaSOL Filtration BGK 4 / 2.5 5000 milliLiter(s) (300 mL/Hr) CRRT <Continuous>  senna 2 Tablet(s) Oral at bedtime  vancomycin  IVPB 1250 milliGRAM(s) IV Intermittent every 12 hours  vasopressin Infusion 0.01 Unit(s)/Min (1.5 mL/Hr) IV Continuous <Continuous>    MEDICATIONS  (PRN):  polyethylene glycol 3350 17 Gram(s) Oral two times a day PRN Constipation    OBJECTIVE:  ICU Vital Signs Last 24 Hrs  T(C): 36.9 (2024 16:00), Max: 37.1 (2024 00:00)  T(F): 98.4 (2024 16:00), Max: 98.8 (2024 00:00)  HR: 91 (2024 19:15) (67 - 100)  ABP: 110/44 (2024 19:15) (85/36 - 113/46)  ABP(mean): 66 (2024 19:15) (51 - 71)  RR: 17 (2024 19:15) (8 - 41)  SpO2: 100% (2024 19:15) (83% - 100%)    O2 Parameters below as of 2024 19:00  Patient On (Oxygen Delivery Method): nasal cannula  O2 Flow (L/min): 3    CAPILLARY BLOOD GLUCOSE  POCT Blood Glucose.: 94 mg/dL (2024 16:43)  POCT Blood Glucose.: 96 mg/dL (2024 11:36)  POCT Blood Glucose.: 139 mg/dL (2024 07:40)  POCT Blood Glucose.: 157 mg/dL (2024 21:45)    CAPILLARY BLOOD GLUCOSE  POCT Blood Glucose.: 94 mg/dL (2024 16:43)    I&O's Summary  2024 07:01  -  2024 07:00  --------------------------------------------------------  IN: 1870 mL / OUT: 3882 mL / NET: - mL    2024 07:01  -  2024 19:49  --------------------------------------------------------  IN: 1028.5 mL / OUT: 1706 mL / NET: -677.5 mL    Daily Weight in k.2 (2024 05:45)    PHYSICAL EXAM:  General: NAD  Chest: Clear to auscultation bilaterally  Heart: Regular rate and rhythm; normal S1 and S2  Abd: Soft, nontender, nondistended  Nervous System: AAOX3  Ext: no peripheral LE edema bilaterally    LABS:  ABG - ( 2024 00:17 )  pH, Arterial: 7.33  pH, Blood: x     /  pCO2: 50    /  pO2: 158   / HCO3: 26    / Base Excess: 0.2   /  SaO2: 99.2                          7.8    9.96  )-----------( 74       ( 2024 00:34 )             25.9     04-27  132<L>  |  100  |  18  ----------------------------<  91  3.9   |  25  |  1.62<H>    Ca    7.6<L>      2024 12:19  Phos  2.1     -  Mg     2.2         TPro  5.6<L>  /  Alb  2.7<L>  /  TBili  0.5  /  DBili  x   /  AST  16  /  ALT  8<L>  /  AlkPhos  57    LIVER FUNCTIONS - ( 2024 12:19 )  Alb: 2.7 g/dL / Pro: 5.6 g/dL / ALK PHOS: 57 U/L / ALT: 8 U/L / AST: 16 U/L / GGT: x         Urinalysis Basic - ( 2024 12:19 )  Color: x / Appearance: x / SG: x / pH: x  Gluc: 91 mg/dL / Ketone: x  / Bili: x / Urobili: x   Blood: x / Protein: x / Nitrite: x   Leuk Esterase: x / RBC: x / WBC x   Sq Epi: x / Non Sq Epi: x / Bacteria: x    ====================ASSESSMENT ==============  73F PMHx HFrEF (EF 30%), AS, LBBB, HTN, IDDM1, CKD, hypothyroidism, chronic lymphedema, OHS/LELIA (3L NC home O2) p/w 1 episode of syncope most likely 2/2 severe AS, adm to floors for TAVR w/u. C/c/b contrast induced allergic reaction (had CTA for TAVR eval) and contrast related renal failure requiring HD & CRRT. S/p TAVR  c/b VT/VF req shock and flash pulmonary edema causing AHRF req intubation. Currently on pressor support & CRRT req management in CICU. Course further c/b CHB req TVP, now s/p Micra.     NEURO:  - Extubated   - Currently AOx4    PULM  # Acute hypoxic respiratory failure most likely 2/2 flash pulmonary edema  # OHS/LELIA (on home oxygen)  - currently on 3L NC saturating well  - hypercarbic on ABG, patient refusing nocturnal bipap   - monitor status    CV  #Vasoplegic shock  - currently on vasopressin 0.01, wean as tolerated for goal SBP >95mmHg  - lactate cleared    #VT/vfib s/p shock  #Known LBBB with first degree AV block  - Monitor on tele  - Trend lytes    #2:1 AV block with 1st degree AV block  #Severe AS s/p TAVR  - s/p Micra , no longer requiring TVP (eventual candidate for CRT per EP?)  - c/w ASA  - c/w atorvastatin     # HFrEF  - EF 25%  - Holding GDMT while requiring pressors    /RENAL  # DUNCAN on CKD requiring HD c/b hyperkalemia  - CRRT  - Strict I/Os  - Trend BMP, lytes  - Avoid nephrotoxins    GI  - resumed diet    ENDO  # IDDM1  # DKA  - s/p Insulin drip  - transitioned to basal/bolus with ISS  - endo consulted, f/u reccs    # Hypothyroidism  - c/w home levothyroxine     SKIN  # Acute generalized exanthematous pustulosis due to drug  - c/w calamine  - c/w vaseline    # Psoriasis   # Lymphedema   - Elevate legs  - Compression stockings, ACE wrapping    ID  Prelim BCx drawn  Gram + cocci in clusters in aerobic bottle  - Monitor WBC and for fevers  - Vanc started  for empiric coverage    # Oral Candidiasis  - c/w nystatin       Dispo: Maintain in ICU.    Patient requires continuous monitoring with bedside rhythm monitoring, arterial line, pulse oximetry, ventilator monitoring and intermittent blood gas analysis.  Care plan discussed with ICU care team.  I have spent 35 minutes providing critical care, in addition to initial critical time provided by CICU attending Dr. Quintero, re-evaluated multiple times during the day.    Georgia Doshi PA-C

## 2024-04-27 NOTE — CHART NOTE - NSCHARTNOTEFT_GEN_A_CORE
73F w HFrEF (EF 30%), mod AS, known LBBB, HTN, T1DM, CKD, hypothyroidism, chronic lymphedema, suspected OHS/LELIA on 3L NC home O2 p/w 1 episode of syncope with R arm jerking, likely 2/2 severe symptomatic AS. S/p TAVR, course c/b vfib, AHRF.     Endocrine following for adrenal nodule and T1DM.    #Concern for cushing's  #L adrenal nodule  Dex suppression with am cortisol 4.1 not suppressed with ACTH 9.5. Dexamethasone 374 with cortisol 9.4 (4/18/24).   Urine cortisol low at 1.2mcg/24 hour but only 200ml for 24 hours? (creatinine 24 hour pending)   - Likely needs to repeat 24 hour urine cortisol once renal function improved and no longer on CRRT  - Follow up midnight salivary cortisol results    #T1DM   -190s at goal  - Continue Lantus 24 unit QHS   - Continue Admelog 8 units TIDAC  - Recommend change from moderate dose scales to low dose admelog scale TIDAC, low dose scale QHS    Calvin Astudillo MD  Endocrine Fellow  Can be reached via teams. For follow up questions, discharge recommendations, or new consults, please call answering service at [phone] (weekdays); [phone] (nights/weekends). 73F w HFrEF (EF 30%), mod AS, known LBBB, HTN, T1DM, CKD, hypothyroidism, chronic lymphedema, suspected OHS/LELIA on 3L NC home O2 p/w 1 episode of syncope with R arm jerking, likely 2/2 severe symptomatic AS. S/p TAVR, course c/b vfib, AHRF.     Endocrine following for adrenal nodule and T1DM.    #Concern for cushing's  #L adrenal nodule  Dex suppression with am cortisol 4.1 not suppressed with ACTH 9.5. Dexamethasone 374 with cortisol 9.4 (4/18/24).   Urine cortisol low at 1.2mcg/24 hour but only 200ml for 24 hours? (creatinine 24 hour pending)   - Likely needs to repeat 24 hour urine cortisol once renal function improved and no longer on CRRT  - Follow up midnight salivary cortisol results  - Follow up dex level    #T1DM   -190s at goal  - Continue Lantus 24 unit QHS   - Continue Admelog 8 units TIDAC  - Can continue moderate dose scales    Calvin Astudillo MD  Endocrine Fellow  Can be reached via teams. For follow up questions, discharge recommendations, or new consults, please call answering service at [phone] (weekdays); [phone] (nights/weekends). 73F w HFrEF (EF 30%), mod AS, known LBBB, HTN, T1DM, CKD, hypothyroidism, chronic lymphedema, suspected OHS/LELIA on 3L NC home O2 p/w 1 episode of syncope with R arm jerking, likely 2/2 severe symptomatic AS. S/p TAVR, course c/b vfib, AHRF.     Endocrine following for adrenal nodule and T1DM.    #Concern for cushing's  #L adrenal nodule  Dex suppression with am cortisol 4.1 not suppressed with ACTH 9.5. Dexamethasone 374 with cortisol 9.4 (4/18/24).   Urine cortisol low at 1.2mcg/24 hour but only 200ml for 24 hours? (creatinine 24 hour pending)   - Likely needs to repeat 24 hour urine cortisol once renal function improved and no longer on CRRT  - Follow up midnight salivary cortisol results  - Follow up dex level    #T1DM   BG down to 90s pre lunch   - Continue Lantus 24 unit QHS   - agree with decrease admelog to 5 units TIDAC  - recommend change moderate dose scales to low dose    Calvin Astudillo MD  Endocrine Fellow  Can be reached via teams. For follow up questions, discharge recommendations, or new consults, please call answering service at [phone] (weekdays); [phone] (nights/weekends).

## 2024-04-27 NOTE — PROGRESS NOTE ADULT - ASSESSMENT
73 year old with  LELIA  DM  CKD  ADHF  recently discharged after treatment for CHF.  admitted for sob  pt developed a diffuse rash all over body after receiving  CT contrast   suspect that this as all anallergic reaction to contrast  media. rash that is pustular in some locations    4/27: Now s/p TAVR with arrest. Suspect reactive leukocytosis.  Exceedingly rapid to suspect PVE. Pretest probability for infection here is low. Merits continued vancomycin pending further incubation and repeat cultures but given that she is a very difficult phelbotomy with friable skin suspect that this mostly likely is a procurement contaminant.     Suggestions--  F/U cx data  Vancomycin 7.5-10mg kg q12h reasonable in the setting of CRRT use.  Monitor levels closely as to not exacerbate renal dysfunction further.    D/W Dr. Chavez    Thank you for the courtesy of this referral.  Dr. Anne to resume care Monday    Please reach out if ID input is needed over the remainder of the weekend.     Jabier Osman MD  Attending Physician  [facility]  Division of Infectious Diseases  [phone]

## 2024-04-27 NOTE — PROGRESS NOTE ADULT - ASSESSMENT
Assessment:  72 y/o female w/ PMH: HFrEF (EF 30%), AS, LBBB, HTN, IDDM1, CKD, hypothyroidism, chronic lymphedema, suspected OHS/LELIA (3L NC home O2) p/w 1 episode of syncope most likely 2/2 moderate to severe AS, admitted to floors for TAVR w/u. Course complicated by suspected contrast induced allergic reaction (had CTA for TAVR eval) and contrast related renal failure (acute on chronic) requiring HD. Pt. went for TAVR on 2/24 which was c/b VT and vfib requiring shock and flash pulmonary edema causing AHRF requiring intubation. Currently undergoing HD for hyperkalemia and fluid overload. Currently on pressor support for possible vasoplegic shock, all of which require management in CICU.    NEURO:  - Extubated 4/26  - Currently AOx4    PULM  # Acute hypoxic respiratory failure most likely 2/2 flash pulmonary edema  # OHS/LELIA (on home oxygen)  - currently on 3L NC saturating well  - monitor status    CV  #Shock state  - initially suspected vasoplegia from sedation, now with persistent pressor requirement is there component of sepsis or hypovolemia from CRRT? less likely obstructive or cardiogenic  - currently on vasopressin 0.09  - lactate cleared  - will start empiric antibiotics with vancomycin for now to address possible underlying infection    #VT/vfib s/p shock  #Known LBBB with first degree AV block  - Monitor on tele  - Trend lytes    #2:1 AV block with 1st degree AV block  #Severe AS s/p TAVR  - s/p Micra 4/25, no longer requiring TVP (eventual candidate for CRT per EP?)  - c/w ASA  - c/w atorvastatin     # HFrEF  - EF 25%  - Holding GDMT while on pressors    /RENAL  # DUNCAN on CKD requiring HD c/b hyperkalemia  - CRRT  - Strict I/Os  - Trend BMP, lytes  - Avoid nephrotoxins    GI  - resumed diet    ENDO  # IDDM1  # DKA  - s/p Insulin drip  - transitioned to basal/bolus    # Hypothyroidism  - c/w home levothyroxine     SKIN  # Acute generalized exanthematous pustulosis due to drug  - c/w calamine  - c/w vaseline    # Psoriasis   # Lymphedema   - Elevate legs  - Compression stockings, ACE wrapping    ID  - Monitor WBC and for fevers    # Oral Candidiasis  - c/w nystatin     Mobilize out of bed as soon as possible. Perhaps once off CRRT and pressors.     Patient requires continuous monitoring with bedside rhythm monitoring, pulse ox monitoring, and intermittent blood gas analysis. Care plan discussed with ICU care team. Patient remained critical and at risk for life threatening decompensation.  Patient seen, examined and plan discussed with CCU team during rounds.    Assessment:  73F  PMH: HFrEF (EF 30%), AS, LBBB, HTN, DM1, CKD, hypothyroidism, chronic lymphedema, LELIA (3L home O2) p/w for syncope 2/2 severe AS, s/p TAVR 4/24. Course complicated by contrast induced allergic reaction (had CTA for TAVR eval) and contrast related renal failure (acute on chronic) requiring HD.  TAVR  c/b VT and vfib requiring shock and flash pulmonary edema causing AHRF requiring intubation. Currently on HD for  fluid overload, on pressor support for distributive septic shock.    NEURO:  - Extubated 4/26  - Currently AOx4    PULM  # AHRF  2/2 flash pulmonary edema  # LELIA (on 3L home oxygen)  - Extubated 4/26  - currently on 3L NC saturating well    CV  #Shock state  - initially  vasoplegia from sedation, now with distributive septic shock  -  on vasopressin 0.06  - lactate cleared  - c/w vancomycin  to address possible underlying infection  -ID following    #VT/vfib s/p shock  #Known LBBB with first degree AV block  - Monitor on tele  - Trend lytes    #2:1 AV block with 1st degree AV block  #Severe AS s/p TAVR  - s/p Micra 4/25, no longer requiring TVP (eventual candidate for CRT per EP)  - c/w ASA, atorvastatin     # HFrEF  - EF 25%  - Holding GDMT while on pressors    /RENAL  # DUNCAN on CKD requiring HD   - CRRT  - Strict I/Os  - Trend BMP, lytes  - Avoid nephrotoxins    GI  - resumed diet    ENDO  # DM1  # DKA  - s/p Insulin drip  - transitioned to basal/bolus    # Hypothyroidism  - c/w home levothyroxine     SKIN  # Acute generalized exanthematous pustulosis   - c/w calamine,  Vaseline    # Psoriasis   # Lymphedema   - Elevate legs  - Compression stockings, ACE wrapping    ID  - Monitor WBC and for fevers  -pos for staph E. bacteremia  - c/w Vancomycin and dose to >15 vanc trough  -ID following    # Oral Candidiasis  - c/w nystatin     Mobilize out of bed  once off CRRT and pressors.     Patient requires continuous monitoring with bedside rhythm monitoring, pulse ox monitoring, and intermittent blood gas analysis. Care plan discussed with ICU care team. Patient remained critical and at risk for life threatening decompensation.  Patient seen, examined and plan discussed with CCU team during rounds.

## 2024-04-27 NOTE — PROGRESS NOTE ADULT - SUBJECTIVE AND OBJECTIVE BOX
[facility]  Division of Infectious Diseases  [phone]    Name: EVELYN LOPEZ  Age: 73y  Gender: Female  MRN: [id number]    Covering Dr. UMAIR Anne.     Interval History--  ID follow up requested regarding +blood culture. Events reviewed. Since last seen by Dr. Anne on 4/18 hospital course notable for TAVR on 4/24 complicated by VT/VF arrest per SICU accept note. Patient intubated and also treated for pulmonery edema, heart block initially treated with transvenous pacer and now s/p micra placement. On 4/25 in response to leukocytosis, blood cx obtained now growing 1/4 bottles of coagulase negative Staphylococcus.     Patient also s/p midline placement and HD cath placement.    Patient extubated 4/26  and without complaint at this time.       Past Medical History--  Hypertension    Diabetes    Lymphedema    H/O left bundle branch block    History of left bundle branch block (LBBB)    Systolic heart failure, chronic    Type 1 diabetes    H/O cataract    History of surgical removal of meniscus of knee    Frozen shoulder    H/O Achilles tendon repair    Fractured skull        For details regarding the patient's social history, family history, and other miscellaneous elements, please refer the initial infectious diseases consultation and/or the admitting history and physical examination for this admission.    Allergies    contrast media (iodine-based) (Fever; Vomiting; Flushing; Hypotension; Rash; Stomach Upset)  Ativan (Rash; Urticaria)  penicillins (Hives (Mod to Severe); Anaphylaxis (Mod to Severe); Short breath (Mod to Severe); Angioedema (Mod to Severe))    Intolerances        Medications--  Antibiotics:  nystatin    Suspension 259054 Unit(s) Oral four times a day  vancomycin  IVPB 1250 milliGRAM(s) IV Intermittent every 12 hours    Immunologic:    Other:  ascorbic acid  aspirin  chewable  atorvastatin  calamine/zinc oxide Lotion  chlorhexidine 2% Cloths  CRRT Treatment  ferrous    sulfate  heparin   Injectable  heparin  Infusion Syringe  insulin glargine Injectable (LANTUS)  insulin lispro (ADMELOG) corrective regimen sliding scale  insulin lispro (ADMELOG) corrective regimen sliding scale  insulin lispro Injectable (ADMELOG)  levothyroxine  multivitamin  petrolatum white Ointment  polyethylene glycol 3350 PRN  PrismaSATE Dialysate BGK 4 / 2.5  PrismaSOL Filtration BGK 0 / 2.5  PrismaSOL Filtration BGK 4 / 2.5  senna  vasopressin Infusion      Review of Systems--  A 10-point review of systems was obtained.   Review of systems otherwise negative except as previously noted.    Physical Examination--  Vital Signs: T(F): 98.4 (04-27-24 @ 07:30), Max: 99 (04-26-24 @ 19:00)  HR: 75 (04-27-24 @ 11:30)  BP: --  RR: 17 (04-27-24 @ 11:30)  SpO2: 100% (04-27-24 @ 11:30)  Wt(kg): --  General: Nontoxic-appearing Female in no acute distress.  HEENT: AT/NC. Anicteric. Conjunctiva pink and moist. Oropharynx clear.   Neck: Not rigid. No sense of mass.  Nodes: None palpable.  Lungs: Diminished BS B  Chest: L ACW HD cath CDI  Heart: Regular rate and rhythm. No Murmur. No rub. No gallop. No palpable thrill.  Abdomen: Bowel sounds present and normoactive. Soft. Nondistended. Nontender.   Extremities: No cyanosis or clubbing. 1+ LE edema. LE stasis dermatitis/dressed.   Skin: Warm. Dry. Good turgor. Scant remaining erythroderma, extensive superficial dry-appearing flaky exfoliation with intact skin underneath. No vasculitic stigmata.  Psychiatric: Appropriate affect and mood for situation.         Laboratory Studies--  CBC                        7.8    9.96  )-----------( 74       ( 27 Apr 2024 00:34 )             25.9       Chemistries  04-27    134<L>  |  101  |  21  ----------------------------<  142<H>  3.6   |  25  |  1.84<H>    Ca    7.6<L>      27 Apr 2024 00:34  Phos  2.5     04-27  Mg     2.3     04-27    TPro  5.3<L>  /  Alb  2.7<L>  /  TBili  0.5  /  DBili  x   /  AST  16  /  ALT  8<L>  /  AlkPhos  56  04-27      Culture Data    Culture - Blood (collected 25 Apr 2024 10:15)  Source: .Blood Blood  Gram Stain (26 Apr 2024 21:56):    Growth in aerobic bottle: Gram Positive Cocci in Clusters  Preliminary Report (26 Apr 2024 21:56):    Growth in aerobic bottle: Gram Positive Cocci in Clusters    Direct identification is available within approximately 3-5    hours either by Blood Panel Multiplexed PCR or Direct    MALDI-TOF. Details: [email or web address]  Organism: Blood Culture PCR (26 Apr 2024 23:34)  Organism: Blood Culture PCR (26 Apr 2024 23:34)    Culture - Blood (collected 25 Apr 2024 10:15)  Source: .Blood Blood  Preliminary Report (26 Apr 2024 18:02):    No growth at 24 hours

## 2024-04-27 NOTE — PROGRESS NOTE ADULT - SUBJECTIVE AND OBJECTIVE BOX
[facility]   [phone]  RENAL FOLLOW UP NOTE  --------------------------------------------------------------------------------  Chief Complaint:    24 hour events/subjective:    seen earlier   on vasopressin   on CRRT     PAST HISTORY  --------------------------------------------------------------------------------  No significant changes to PMH, PSH, FHx, SHx, unless otherwise noted    ALLERGIES & MEDICATIONS  --------------------------------------------------------------------------------  Allergies    contrast media (iodine-based) (Fever; Vomiting; Flushing; Hypotension; Rash; Stomach Upset)  Ativan (Rash; Urticaria)  penicillins (Hives (Mod to Severe); Anaphylaxis (Mod to Severe); Short breath (Mod to Severe); Angioedema (Mod to Severe))    Intolerances      Standing Inpatient Medications  ascorbic acid 500 milliGRAM(s) Oral daily  aspirin  chewable 81 milliGRAM(s) Oral daily  atorvastatin 80 milliGRAM(s) Oral at bedtime  calamine/zinc oxide Lotion 1 Application(s) Topical three times a day  chlorhexidine 2% Cloths 1 Application(s) Topical daily  chlorhexidine 4% Liquid 1 Application(s) Topical <User Schedule>  CRRT Treatment    <Continuous>  ferrous    sulfate 325 milliGRAM(s) Oral two times a day  heparin   Injectable 5000 Unit(s) SubCutaneous every 8 hours  heparin  Infusion Syringe 300 Unit(s)/Hr CRRT <Continuous>  insulin glargine Injectable (LANTUS) 24 Unit(s) SubCutaneous at bedtime  insulin lispro (ADMELOG) corrective regimen sliding scale   SubCutaneous three times a day before meals  insulin lispro (ADMELOG) corrective regimen sliding scale   SubCutaneous at bedtime  insulin lispro Injectable (ADMELOG) 5 Unit(s) SubCutaneous three times a day before meals  levothyroxine 75 MICROGram(s) Oral daily  multivitamin 1 Tablet(s) Oral daily  nystatin    Suspension 124904 Unit(s) Oral four times a day  petrolatum white Ointment 1 Application(s) Topical three times a day  potassium phosphate / sodium phosphate Powder (PHOS-NaK) 1 Packet(s) Oral three times a day with meals  PrismaSATE Dialysate BGK 4 / 2.5 5000 milliLiter(s) CRRT <Continuous>  PrismaSOL Filtration BGK 0 / 2.5 5000 milliLiter(s) CRRT <Continuous>  PrismaSOL Filtration BGK 4 / 2.5 5000 milliLiter(s) CRRT <Continuous>  senna 2 Tablet(s) Oral at bedtime  vancomycin  IVPB 1250 milliGRAM(s) IV Intermittent every 12 hours  vasopressin Infusion 0.06 Unit(s)/Min IV Continuous <Continuous>    PRN Inpatient Medications  polyethylene glycol 3350 17 Gram(s) Oral two times a day PRN      REVIEW OF SYSTEMS  --------------------------------------------------------------------------------    Gen: denies  fevers/chills,  CVS: denies chest pain/palpitations  Resp: denies SOB/Cough  GI: Denies N/V/Abd pain  : Denies dysuria     VITALS/PHYSICAL EXAM  --------------------------------------------------------------------------------  T(C): 36.8 (04-27-24 @ 11:00), Max: 37.2 (04-26-24 @ 19:00)  HR: 88 (04-27-24 @ 15:00) (67 - 94)  BP: --  RR: 18 (04-27-24 @ 15:00) (7 - 41)  SpO2: 100% (04-27-24 @ 15:00) (83% - 100%)  Wt(kg): --        04-26-24 @ 07:01  -  04-27-24 @ 07:00  --------------------------------------------------------  IN: 1870 mL / OUT: 3882 mL / NET: -2012 mL    04-27-24 @ 07:01  -  04-27-24 @ 15:18  --------------------------------------------------------  IN: 274.5 mL / OUT: 288 mL / NET: -13.5 mL      Physical Exam:  	    Gen:  on RA, facial erythema, neck and arm erythema,  intubated   	Pulm: decrease bs  no rales or ronchi or wheezing  	CV: No JVD. RRR, S1S2; no rub II/VI M   	Abd: +BS, soft, nontender/nondistended, obese   	UE: Warm, no cyanosis  no clubbing,   +  edema  	LE: Warm, no cyanosis  no clubbing, 4+ edema, B/L LE raised red plaque   	Neuro: intubated                Vascular Access: RIJ non tunneled HD catheter      LABS/STUDIES  --------------------------------------------------------------------------------              7.8    9.96  >-----------<  74       [04-27-24 @ 00:34]              25.9     132  |  100  |  18  ----------------------------<  91      [04-27-24 @ 12:19]  3.9   |  25  |  1.62        Ca     7.6     [04-27-24 @ 12:19]      Mg     2.2     [04-27-24 @ 12:19]      Phos  2.1     [04-27-24 @ 12:19]    TPro  5.6  /  Alb  2.7  /  TBili  0.5  /  DBili  x   /  AST  16  /  ALT  8   /  AlkPhos  57  [04-27-24 @ 12:19]          Creatinine Trend:  SCr 1.62 [04-27 @ 12:19]  SCr 1.84 [04-27 @ 00:34]  SCr 2.25 [04-26 @ 00:44]  SCr 2.23 [04-25 @ 20:57]  SCr 2.46 [04-25 @ 15:22]      PTH -- (Ca 8.4)      [04-19-24 @ 17:54]   109  TSH 5.06      [04-14-24 @ 07:18]  Lipid: chol 74, TG 70, HDL 33, LDL --      [04-13-24 @ 07:05]

## 2024-04-27 NOTE — PROGRESS NOTE ADULT - SUBJECTIVE AND OBJECTIVE BOX
PROGRESS NOTE:   Authored by Dr. Jane Sandy  Patient is a 73y old  Female who presents with a chief complaint of Syncope (27 Apr 2024 09:29)        OVERNIGHT EVENTS: Pt pos for MRSE, vancomycin started, pt refused CPAP , was on CRRT w/ fluid removal. Circuit clotted x2.    SUBJECTIVE: Patient was seen and examined at bedside this morning.   Denies any nausea/vomiting/diarrhea, headache, shortness of breath, abdominal pain or chest pain/palpitations.   Patient responding appropriately to questions and able to make needs known.   Vital signs/imaging/labs/telemetry events reviewed.   No acute complaints or concerns.     All other ROS neg except as above       MEDICATIONS  (STANDING):  ascorbic acid 500 milliGRAM(s) Oral daily  aspirin  chewable 81 milliGRAM(s) Oral daily  atorvastatin 80 milliGRAM(s) Oral at bedtime  calamine/zinc oxide Lotion 1 Application(s) Topical three times a day  chlorhexidine 2% Cloths 1 Application(s) Topical daily  CRRT Treatment    <Continuous>  ferrous    sulfate 325 milliGRAM(s) Oral two times a day  heparin   Injectable 5000 Unit(s) SubCutaneous every 8 hours  heparin  Infusion Syringe 300 Unit(s)/Hr (0.6 mL/Hr) CRRT <Continuous>  insulin glargine Injectable (LANTUS) 24 Unit(s) SubCutaneous at bedtime  insulin lispro (ADMELOG) corrective regimen sliding scale   SubCutaneous at bedtime  insulin lispro (ADMELOG) corrective regimen sliding scale   SubCutaneous three times a day before meals  insulin lispro Injectable (ADMELOG) 8 Unit(s) SubCutaneous three times a day before meals  levothyroxine 75 MICROGram(s) Oral daily  multivitamin 1 Tablet(s) Oral daily  nystatin    Suspension 499724 Unit(s) Oral four times a day  petrolatum white Ointment 1 Application(s) Topical three times a day  PrismaSATE Dialysate BGK 4 / 2.5 5000 milliLiter(s) (1400 mL/Hr) CRRT <Continuous>  PrismaSOL Filtration BGK 0 / 2.5 5000 milliLiter(s) (1000 mL/Hr) CRRT <Continuous>  PrismaSOL Filtration BGK 4 / 2.5 5000 milliLiter(s) (300 mL/Hr) CRRT <Continuous>  senna 2 Tablet(s) Oral at bedtime  vancomycin  IVPB 1250 milliGRAM(s) IV Intermittent every 12 hours  vasopressin Infusion 0.06 Unit(s)/Min (9 mL/Hr) IV Continuous <Continuous>    MEDICATIONS  (PRN):  polyethylene glycol 3350 17 Gram(s) Oral two times a day PRN Constipation      CAPILLARY BLOOD GLUCOSE      POCT Blood Glucose.: 139 mg/dL (27 Apr 2024 07:40)  POCT Blood Glucose.: 157 mg/dL (26 Apr 2024 21:45)  POCT Blood Glucose.: 190 mg/dL (26 Apr 2024 16:54)  POCT Blood Glucose.: 239 mg/dL (26 Apr 2024 11:51)    I&O's Summary    26 Apr 2024 07:01  -  27 Apr 2024 07:00  --------------------------------------------------------  IN: 1870 mL / OUT: 3882 mL / NET: -2012 mL    27 Apr 2024 07:01  -  27 Apr 2024 10:02  --------------------------------------------------------  IN: 247.5 mL / OUT: 288 mL / NET: -40.5 mL        PHYSICAL EXAM:  Vital Signs Last 24 Hrs  T(C): 36.9 (27 Apr 2024 07:30), Max: 37.2 (26 Apr 2024 19:00)  T(F): 98.4 (27 Apr 2024 07:30), Max: 99 (26 Apr 2024 19:00)  HR: 86 (27 Apr 2024 09:15) (67 - 94)  BP: --  BP(mean): --  RR: 17 (27 Apr 2024 09:15) (5 - 41)  SpO2: 100% (27 Apr 2024 09:15) (83% - 100%)    Parameters below as of 27 Apr 2024 07:30  Patient On (Oxygen Delivery Method): nasal cannula  O2 Flow (L/min): 3      PHYSICAL EXAM:     GENERAL: NAD  HEAD:  Atraumatic, Normocephalic  EYES: EOMI, conjunctiva and sclera clear, no nystagmus noted  ENT: Moist mucous membranes  CHEST/LUNG: normal work of breathing, Clear to auscultation bilaterally; No rales, rhonchi, wheezing, or rubs. Unlabored respirations  HEART:  Regular rate and rhythm; No murmurs, rubs, or gallops, normal S1/S2  ABDOMEN: normal bowel sounds; Soft, nontender, nondistended, no organomegaly   EXTREMITIES:  extensive chronic edema in b/l LE, dec Peripheral Pulses, No clubbing, cyanosis  MSK: No gross deformities noted, ROM wnl   Neurological:  A&Ox3, no focal deficits   SKIN: dry peeling flakey pale,  scattered psoriatic plaques throughout extremities and torso, no blistering  or  drainage  BL LE soft plaques easily lifted w/ mechanical debridement , w/ open skin & blistering and scant drainage  Rt plantar foot w/ dark blister around callous  No odor, erythema, increased warmth, tenderness, induration, fluctuance, nor crepitus  PSYCH: Normal mood, affect         LABS:                        7.8    9.96  )-----------( 74       ( 27 Apr 2024 00:34 )             25.9     04-27    134<L>  |  101  |  21  ----------------------------<  142<H>  3.6   |  25  |  1.84<H>    Ca    7.6<L>      27 Apr 2024 00:34  Phos  2.5     04-27  Mg     2.3     04-27    TPro  5.3<L>  /  Alb  2.7<L>  /  TBili  0.5  /  DBili  x   /  AST  16  /  ALT  8<L>  /  AlkPhos  56  04-27            Culture - Blood (collected 25 Apr 2024 10:15)  Source: .Blood Blood  Gram Stain (26 Apr 2024 21:56):    Growth in aerobic bottle: Gram Positive Cocci in Clusters  Preliminary Report (26 Apr 2024 21:56):    Growth in aerobic bottle: Gram Positive Cocci in Clusters    Direct identification is available within approximately 3-5    hours either by Blood Panel Multiplexed PCR or Direct    MALDI-TOF. Details: [email or web address]  Organism: Blood Culture PCR (26 Apr 2024 23:34)  Organism: Blood Culture PCR (26 Apr 2024 23:34)    Culture - Blood (collected 25 Apr 2024 10:15)  Source: .Blood Blood  Preliminary Report (26 Apr 2024 18:02):    No growth at 24 hours        Tele Reviewed:    RADIOLOGY & ADDITIONAL TESTS:  Results Reviewed: yes  Imaging Personally Reviewed: yes  Electrocardiogram Personally Reviewed: yes

## 2024-04-28 NOTE — CONSULT NOTE ADULT - SUBJECTIVE AND OBJECTIVE BOX
EVELYN LOPEZ  [id number] 73yF   --------------------------------------  Patient is a 73y old  Female who presents with a chief complaint of Syncope (28 Apr 2024 09:44)      HPI:  73F w HFrEF (EF 30%), mod AS, known LBBB, HTN, IDDM1, CKD, hypothyroidism, chronic lymphedema, suspected OHS/LELIA on 3L NC home O2 p/w 1 episode of syncope. Recent admission at [facility] (3/29-4/5) for ADHF and DUNCAN s/p diuresis, discharged with new home O2 3L NC suspecting OHS/LELIA pending outpatient w/u. Since discharge a week ago, she has been staying at home with   Pt had sob walking to car. Once in car, she was still breathing heavily. Partner noticed pt was not responding to verbal stimuli for 10-15sec and witnessed R arm jerking. Pt gained consciousness quickly without postictal symptoms. Pt went to Cardiologist Dr. Nathan who recommended pt to come to ED. No fever, cp, abd pain, n/v. Leg swelling is improved from prior. Pt on torsemide 40mg which she has been taking. Pt was discharged on 3LNC which she is currently on. Patient reports she did not take Farxiga that she was discharged on as she was concerned about side effects.     In ED, patient was found afebrile, hemodynamically stable, breathing well on 3L NC. Initial labs notable for mild hyponatremia, DUNCAN on CKD, elevated proBNP and elevated pCO2 both improved from prior. (12 Apr 2024 16:31)      PAST MEDICAL & SURGICAL HISTORY:  Hypertension      Diabetes      Lymphedema      H/O left bundle branch block      History of left bundle branch block (LBBB)      Systolic heart failure, chronic      Type 1 diabetes      H/O cataract  2020      History of surgical removal of meniscus of knee  left in 1971      Frozen shoulder  2000      H/O Achilles tendon repair  lengthened bilaterally, 2000      Fractured skull  1968          MEDICATIONS  (STANDING):  ascorbic acid 500 milliGRAM(s) Oral daily  aspirin  chewable 81 milliGRAM(s) Oral daily  atorvastatin 80 milliGRAM(s) Oral at bedtime  calamine/zinc oxide Lotion 1 Application(s) Topical three times a day  chlorhexidine 2% Cloths 1 Application(s) Topical daily  chlorhexidine 4% Liquid 1 Application(s) Topical <User Schedule>  CRRT Treatment    <Continuous>  epoetin mendoza-epbx (RETACRIT) Injectable 08881 Unit(s) IV Push <User Schedule>  ferrous    sulfate 325 milliGRAM(s) Oral two times a day  heparin   Injectable 5000 Unit(s) SubCutaneous every 8 hours  heparin  Infusion Syringe 300 Unit(s)/Hr (0.6 mL/Hr) CRRT <Continuous>  insulin glargine Injectable (LANTUS) 24 Unit(s) SubCutaneous at bedtime  insulin lispro (ADMELOG) corrective regimen sliding scale   SubCutaneous Before meals and at bedtime  insulin lispro Injectable (ADMELOG) 4 Unit(s) SubCutaneous three times a day before meals  levothyroxine 75 MICROGram(s) Oral daily  multivitamin 1 Tablet(s) Oral daily  nystatin    Suspension 931090 Unit(s) Oral four times a day  petrolatum white Ointment 1 Application(s) Topical three times a day  potassium phosphate / sodium phosphate Powder (PHOS-NaK) 1 Packet(s) Oral two times a day  PrismaSATE Dialysate BGK 4 / 2.5 5000 milliLiter(s) (1400 mL/Hr) CRRT <Continuous>  PrismaSOL Filtration BGK 4 / 2.5 5000 milliLiter(s) (1000 mL/Hr) CRRT <Continuous>  PrismaSOL Filtration BGK 4 / 2.5 5000 milliLiter(s) (300 mL/Hr) CRRT <Continuous>  senna 2 Tablet(s) Oral at bedtime  vancomycin  IVPB 1250 milliGRAM(s) IV Intermittent every 12 hours  vasopressin Infusion 0.02 Unit(s)/Min (3 mL/Hr) IV Continuous <Continuous>    MEDICATIONS  (PRN):  polyethylene glycol 3350 17 Gram(s) Oral two times a day PRN Constipation      Allergies    contrast media (iodine-based) (Fever; Vomiting; Flushing; Hypotension; Rash; Stomach Upset)  Ativan (Rash; Urticaria)  penicillins (Hives (Mod to Severe); Anaphylaxis (Mod to Severe); Short breath (Mod to Severe); Angioedema (Mod to Severe))    Intolerances        --------------------------------------  VITALS:    Vital Signs Last 24 Hrs  T(C): 37.3 (28 Apr 2024 08:00), Max: 37.3 (28 Apr 2024 08:00)  T(F): 99.1 (28 Apr 2024 08:00), Max: 99.1 (28 Apr 2024 08:00)  HR: 93 (28 Apr 2024 13:00) (75 - 100)  BP: --  BP(mean): --  RR: 17 (28 Apr 2024 13:00) (8 - 27)  SpO2: 100% (28 Apr 2024 13:00) (90% - 100%)    Parameters below as of 28 Apr 2024 12:30  Patient On (Oxygen Delivery Method): room air        --------------------------------------  LABS:                          7.9    13.46 )-----------( 70       ( 28 Apr 2024 00:50 )             26.9       04-28    135  |  103  |  16  ----------------------------<  136<H>  4.0   |  25  |  1.52<H>    Ca    7.3<L>      28 Apr 2024 00:50  Phos  1.8     04-28  Mg     2.3     04-28    TPro  5.2<L>  /  Alb  2.6<L>  /  TBili  0.6  /  DBili  x   /  AST  17  /  ALT  9<L>  /  AlkPhos  65  04-28      CAPILLARY BLOOD GLUCOSE      POCT Blood Glucose.: 212 mg/dL (28 Apr 2024 12:05)  POCT Blood Glucose.: 237 mg/dL (28 Apr 2024 07:52)  POCT Blood Glucose.: 131 mg/dL (27 Apr 2024 21:47)  POCT Blood Glucose.: 94 mg/dL (27 Apr 2024 16:43)      PT/INR - ( 28 Apr 2024 00:50 )   PT: 11.7 sec;   INR: 1.07 ratio         PTT - ( 28 Apr 2024 00:50 )  PTT:35.4 sec    LOWER EXTREMITY PHYSICAL EXAM:    Vascular: DP/PT 0 /4, B/L, CFT < 3seconds B/L, Temperature gradient warm to cool, B/L.   Neuro: Epicritic sensation intact to the level of forefoot, B/L.  Musculoskeletal/Ortho: 3+ pitting edema globally to bilateral foot  Skin: L proximal dorsal stable eschar, no tracking/ tunnelling/ signs of infection, L foot heel early DTI no skin breakdown, R foot plantar wound to subQ, no tracking/ tunnelling/ signs of infection, no purulence, R foot heel early DTI, no skin breakdown.     RADIOLOGY & ADDITIONAL STUDIES:

## 2024-04-28 NOTE — PROGRESS NOTE ADULT - TIME BILLING
- Review of records, telemetry, vital signs and daily labs.   - General and cardiovascular physical examination.  - Generation of cardiovascular treatment plan.  - Coordination of care.      Patient was seen and examined by me on  04/28/2024,interim events noted,labs and radiology studies reviewed.  Cuco Tubbs MD,FACC.  [address] [place], [place] [id number].  [phone]

## 2024-04-28 NOTE — PROGRESS NOTE ADULT - SUBJECTIVE AND OBJECTIVE BOX
[facility]  Division of Infectious Diseases  [phone]    Name: EVELYN LOPEZ  Age: 73y  Gender: Female  MRN: [id number]    Interval History--  Notes reviewed. No new complaints. Only issue on ROS is constipation.     Past Medical History--  Hypertension    Diabetes    Lymphedema    H/O left bundle branch block    History of left bundle branch block (LBBB)    Systolic heart failure, chronic    Type 1 diabetes    H/O cataract    History of surgical removal of meniscus of knee    Frozen shoulder    H/O Achilles tendon repair    Fractured skull        For details regarding the patient's social history, family history, and other miscellaneous elements, please refer the initial infectious diseases consultation and/or the admitting history and physical examination for this admission.    Allergies    contrast media (iodine-based) (Fever; Vomiting; Flushing; Hypotension; Rash; Stomach Upset)  Ativan (Rash; Urticaria)  penicillins (Hives (Mod to Severe); Anaphylaxis (Mod to Severe); Short breath (Mod to Severe); Angioedema (Mod to Severe))    Intolerances        Medications--  Antibiotics:  nystatin    Suspension 274173 Unit(s) Oral four times a day  vancomycin  IVPB 1250 milliGRAM(s) IV Intermittent every 12 hours    Immunologic:  epoetin mendoza-epbx (RETACRIT) Injectable 41398 Unit(s) IV Push <User Schedule>    Other:  ascorbic acid  aspirin  chewable  atorvastatin  calamine/zinc oxide Lotion  chlorhexidine 2% Cloths  chlorhexidine 4% Liquid  CRRT Treatment  ferrous    sulfate  heparin   Injectable  heparin  Infusion Syringe  insulin glargine Injectable (LANTUS)  insulin lispro (ADMELOG) corrective regimen sliding scale  insulin lispro Injectable (ADMELOG)  levothyroxine  multivitamin  petrolatum white Ointment  polyethylene glycol 3350 PRN  potassium phosphate / sodium phosphate Powder (PHOS-NaK)  potassium phosphate / sodium phosphate Powder (PHOS-NaK)  PrismaSATE Dialysate BGK 4 / 2.5  PrismaSOL Filtration BGK 4 / 2.5  PrismaSOL Filtration BGK 4 / 2.5  senna  vasopressin Infusion      Review of Systems--  A 10-point review of systems was obtained.   Review of systems otherwise unchanged compared to prior visit except as previously noted.    Physical Examination--  Vital Signs: T(F): 99.1 (04-28-24 @ 08:00), Max: 99.1 (04-28-24 @ 08:00)  HR: 80 (04-28-24 @ 08:00)  BP: --  RR: 20 (04-28-24 @ 08:00)  SpO2: 97% (04-28-24 @ 08:00)  Wt(kg): --  General: Nontoxic-appearing Female in no acute distress.  HEENT: AT/NC. Anicteric. Conjunctiva pink and moist. Oropharynx clear.   Neck: Not rigid. No sense of mass.  Nodes: None palpable.  Lungs: Diminished BS B no RWR  Chest: L ACW HD cath CDI  Heart: Regular rate and rhythm. No Murmur. No rub. No gallop. No palpable thrill.  Abdomen: Bowel sounds present and normoactive. Soft. Nondistended. Nontender.   Extremities: No cyanosis or clubbing. 1+ LE edema. LE stasis dermatitis/dressed.   Skin: Warm. Dry. Good turgor. Rash no change.   Psychiatric: Appropriate affect and mood for situation.         Laboratory Studies--  CBC                        7.9    13.46 )-----------( 70       ( 28 Apr 2024 00:50 )             26.9       Chemistries  04-28    135  |  103  |  16  ----------------------------<  136<H>  4.0   |  25  |  1.52<H>    Ca    7.3<L>      28 Apr 2024 00:50  Phos  1.8     04-28  Mg     2.3     04-28    TPro  5.2<L>  /  Alb  2.6<L>  /  TBili  0.6  /  DBili  x   /  AST  17  /  ALT  9<L>  /  AlkPhos  65  04-28      Culture Data    Culture - Blood (collected 25 Apr 2024 10:15)  Source: .Blood Blood  Gram Stain (26 Apr 2024 21:56):    Growth in aerobic bottle: Gram Positive Cocci in Clusters  Final Report (27 Apr 2024 14:26):    Growth in aerobic bottle: Staphylococcus epidermidis    Isolation of Coagulase negative Staphylococcus from single blood culture    sets may represent    contamination. Contact the Microbiology Department at [phone] if    susceptibility testing is    clinically indicated.    Direct identification is available within approximately 3-5    hours either by Blood Panel Multiplexed PCR or Direct    MALDI-TOF. Details: [email or web address]  Organism: Blood Culture PCR (27 Apr 2024 14:26)  Organism: Blood Culture PCR (27 Apr 2024 14:26)    Culture - Blood (collected 25 Apr 2024 10:15)  Source: .Blood Blood  Preliminary Report (27 Apr 2024 18:01):    No growth at 48 Hours

## 2024-04-28 NOTE — PROGRESS NOTE ADULT - SUBJECTIVE AND OBJECTIVE BOX
[facility]   [phone]  RENAL FOLLOW UP NOTE  --------------------------------------------------------------------------------  Chief Complaint:    24 hour events/subjective:    seen earlier   on CRRT     PAST HISTORY  --------------------------------------------------------------------------------  No significant changes to PMH, PSH, FHx, SHx, unless otherwise noted    ALLERGIES & MEDICATIONS  --------------------------------------------------------------------------------  Allergies    contrast media (iodine-based) (Fever; Vomiting; Flushing; Hypotension; Rash; Stomach Upset)  Ativan (Rash; Urticaria)  penicillins (Hives (Mod to Severe); Anaphylaxis (Mod to Severe); Short breath (Mod to Severe); Angioedema (Mod to Severe))    Intolerances      Standing Inpatient Medications  ascorbic acid 500 milliGRAM(s) Oral daily  aspirin  chewable 81 milliGRAM(s) Oral daily  atorvastatin 80 milliGRAM(s) Oral at bedtime  calamine/zinc oxide Lotion 1 Application(s) Topical three times a day  chlorhexidine 2% Cloths 1 Application(s) Topical daily  chlorhexidine 4% Liquid 1 Application(s) Topical <User Schedule>  CRRT Treatment    <Continuous>  epoetin mendoza-epbx (RETACRIT) Injectable 84842 Unit(s) IV Push <User Schedule>  ferrous    sulfate 325 milliGRAM(s) Oral two times a day  heparin   Injectable 5000 Unit(s) SubCutaneous every 8 hours  heparin  Infusion Syringe 300 Unit(s)/Hr CRRT <Continuous>  insulin glargine Injectable (LANTUS) 24 Unit(s) SubCutaneous at bedtime  insulin lispro (ADMELOG) corrective regimen sliding scale   SubCutaneous Before meals and at bedtime  insulin lispro Injectable (ADMELOG) 4 Unit(s) SubCutaneous three times a day before meals  levothyroxine 75 MICROGram(s) Oral daily  multivitamin 1 Tablet(s) Oral daily  nystatin    Suspension 784794 Unit(s) Oral four times a day  petrolatum white Ointment 1 Application(s) Topical three times a day  PrismaSATE Dialysate BGK 4 / 2.5 5000 milliLiter(s) CRRT <Continuous>  PrismaSOL Filtration BGK 4 / 2.5 5000 milliLiter(s) CRRT <Continuous>  PrismaSOL Filtration BGK 4 / 2.5 5000 milliLiter(s) CRRT <Continuous>  senna 2 Tablet(s) Oral at bedtime  sodium phosphate 15 milliMole(s)/250 mL IVPB 15 milliMole(s) IV Intermittent once  vasopressin Infusion 0.02 Unit(s)/Min IV Continuous <Continuous>    PRN Inpatient Medications  polyethylene glycol 3350 17 Gram(s) Oral two times a day PRN      REVIEW OF SYSTEMS  --------------------------------------------------------------------------------    Gen: denies chills,  CVS: denies chest pain/palpitations  Resp: denies SOB/Cough  GI: Denies N/V/Abd pain  : Denies dysuria    VITALS/PHYSICAL EXAM  --------------------------------------------------------------------------------  T(C): 36.7 (04-28-24 @ 15:00), Max: 37.3 (04-28-24 @ 08:00)  HR: 92 (04-28-24 @ 18:30) (75 - 100)  BP: --  RR: 21 (04-28-24 @ 18:30) (8 - 25)  SpO2: 95% (04-28-24 @ 18:30) (90% - 100%)  Wt(kg): --        04-27-24 @ 07:01  -  04-28-24 @ 07:00  --------------------------------------------------------  IN: 1405 mL / OUT: 4302 mL / NET: -2897 mL    04-28-24 @ 07:01  -  04-28-24 @ 19:16  --------------------------------------------------------  IN: 1065 mL / OUT: 3585 mL / NET: -2520 mL      Physical Exam:  	    Gen:  on RA, facial erythema, neck and arm erythema  	Pulm: decrease bs  no rales or ronchi or wheezing  	CV: No JVD. RRR, S1S2; no rub II/VI M   	Abd: +BS, soft, nontender/nondistended, obese   	UE: Warm, no cyanosis  no clubbing,   +  edema  	LE: Warm, no cyanosis  no clubbing, 4+ edema, B/L LE raised red plaque   	Neuro: alert and oriented               Vascular Access: +  non tunneled HD catheter      LABS/STUDIES  --------------------------------------------------------------------------------              7.9    13.46 >-----------<  70       [04-28-24 @ 00:50]              26.9     133  |  101  |  14  ----------------------------<  171      [04-28-24 @ 16:31]  4.2   |  23  |  1.33        Ca     7.7     [04-28-24 @ 16:31]      Mg     2.4     [04-28-24 @ 16:31]      Phos  1.7     [04-28-24 @ 16:31]    TPro  5.8  /  Alb  2.8  /  TBili  0.6  /  DBili  x   /  AST  18  /  ALT  11  /  AlkPhos  77  [04-28-24 @ 16:31]    PT/INR: PT 11.7 , INR 1.07       [04-28-24 @ 00:50]  PTT: 35.4       [04-28-24 @ 00:50]      Creatinine Trend:  SCr 1.33 [04-28 @ 16:31]  SCr 1.52 [04-28 @ 00:50]  SCr 1.60 [04-27 @ 20:29]  SCr 1.62 [04-27 @ 12:19]  SCr 1.84 [04-27 @ 00:34]            PTH -- (Ca 8.4)      [04-19-24 @ 17:54]   109  TSH 5.06      [04-14-24 @ 07:18]  Lipid: chol 74, TG 70, HDL 33, LDL --      [04-13-24 @ 07:05]

## 2024-04-28 NOTE — PROGRESS NOTE ADULT - CRITICAL CARE ATTENDING COMMENT
73 F w/ bicuspid severe AS s/p TAVR w/ course c/b bradycardia.    Neuro: Off sedation, baseline mental status  Resp: Extubated, weaned to NC. Continue to wean  CVS: Vasoplegia requiring vaso, slowly weaning. s/p micrappm and TAVR  Renal: c/w CVVH for aggressive volume removal.  GI: PO diet as tolerated, bowel regimen PRN  ID: Leukocytosis resolved, cultures with MR staph epi. c/w vanc. ID eval. Change lines   Endo:  T1DM, c/w lantus and pre-meals, ISS  Heme: HSQ  R subclavian shiley 4/18 73 F w/ bicuspid severe AS s/p TAVR w/ course c/b bradycardia.    Neuro: Off sedation, baseline mental status  Resp: Extubated, weaned to NC. Continue to wean  CVS: Vasoplegia requiring vaso, slowly weaning. s/p micrappm and TAVR  Renal: c/w CVVH for aggressive volume removal.  GI: PO diet as tolerated, bowel regimen PRN  ID: Leukocytosis resolved, cultures with MR zavaleta epi. c/w vanc. ID eval. repeat cultures pending, unclear if contaminant.   Endo:  T1DM, c/w lantus and pre-meals, ISS  Heme: HSQ, thrombocytopenia stable, PF4/GINA pending, likely in the setting of CVVH and acute illness  L subclavian herve 4/27 73 F w/ bicuspid severe AS s/p TAVR w/ course c/b bradycardia.    Neuro: Off sedation, baseline mental status  Resp: Extubated, weaned to NC. Continue to wean  CVS: Vasoplegia requiring vaso, slowly weaning. s/p micrappm and TAVR  Renal: c/w CVVH for aggressive volume removal.  GI: PO diet as tolerated, bowel regimen PRN  ID: Leukocytosis improving, cultures with MR zavaleta epi. c/w vanc. ID eval. repeat cultures pending, unclear if contaminant.   Endo:  T1DM, c/w lantus and pre-meals, ISS  Heme: HSQ, thrombocytopenia stable, PF4/GINA pending, likely in the setting of CVVH and acute illness  L subclavian herve 4/27

## 2024-04-28 NOTE — CONSULT NOTE ADULT - ASSESSMENT
73y Female with bilateral foot wound, + heel early DTI  - Patient seen and evaluated  -  T(F): 99.1, , WBC 13.46  - L proximal dorsal stable eschar, no tracking/ tunnelling/ signs of infection, L foot heel early DTI no skin breakdown, R foot plantar wound to subQ, no tracking/ tunnelling/ signs of infection, no purulence, R foot heel early DTI, no skin breakdown.   - Foot highly unlikely to be the source of elevated inflammatory marker. no culture indicated  - Wound care recs: please apply IODOSORB to bilateral foot wound followed by 4x4 Gauze, and Kerlex. DAILY   - STRICT decubitus precautions, Z- FLOWS boots at all time when in bed and in chair resting.   - Patient stable for discharge from podiatry and follow up outpatient with Dr. Meza within ONE week via [phone]  - Will follow up periodically while in house, reconsult sooner if symptoms worsenses   - Discussed with attending.

## 2024-04-28 NOTE — PROGRESS NOTE ADULT - ASSESSMENT
73F w HFrEF (EF 30%), mod AS, known LBBB, HTN, IDDM1, CKD, hypothyroidism, chronic lymphedema, suspected OHS/LELIA on 3L NC home O2 p/w 1 episode of syncope. Recent admission at [facility] (3/29-4/5) for ADHF and DUNCAN s/p diuresis, discharged with new home O2 3L NC suspecting OHS/LELIA pending outpatient w/u. Since discharge a week ago, she has been staying at home with   Pt had sob walking to car. Once in car, she was still breathing heavily. Partner noticed pt was not responding to verbal stimuli for 10-15sec and witnessed R arm jerking. Pt gained consciousness quickly without postictal symptoms. Pt went to Cardiologist Dr. Nathan who recommended pt to come to ED. No fever, cp, abd pain, n/v. Leg swelling is improved from prior. Pt on torsemide 40mg which she has been taking. Pt was discharged on 3LNC which she is currently on. Patient reports she did not take Farxiga that she was discharged on as she was concerned about side effects.     In ED, patient was found afebrile, hemodynamically stable, breathing well on 3L NC. Initial labs notable for mild hyponatremia, DUNCAN on CKD, elevated proBNP and elevated pCO2 both improved from prior.  pt also noticed with abn creatinine      1- CKD IV  with DUNCAN   2- CHF   3- lymphedema   4- severe AS  s/p tavr 4/24  5- syncope   6- hyperkalemia  7- hypotension         suspect ATN from hypotension along with contrast nephropathy   creatinine worsening requiring renal replacement therapy, HD initiated 4/18  cont CRRT  bfr 200 heparin 300 unit/hr to circuit and cont  dfr 1400 fluid removal 220 cc/hr   see new orders   structural heart team following, s/p TAVR  derm following for rash  pressor support  with vasopressin   d.w CCU team

## 2024-04-28 NOTE — PROGRESS NOTE ADULT - ASSESSMENT
Assessment:  73F  PMH: HFrEF (EF 30%), AS, LBBB, HTN, DM1, CKD, hypothyroidism, chronic lymphedema, LELIA (3L home O2) p/w for syncope 2/2 severe AS, s/p TAVR 4/24. Course complicated by contrast induced allergic reaction (had CTA for TAVR eval) and contrast related renal failure (acute on chronic) requiring HD.  TAVR  c/b VT and vfib requiring shock and flash pulmonary edema causing AHRF requiring intubation. Currently on HD for  fluid overload, on pressor support for distributive septic shock.    NEURO:  - Extubated 4/26  - Currently AOx4    PULM  # AHRF  2/2 flash pulmonary edema  # LELIA (on 3L home oxygen)  - Extubated 4/26  - currently on 3L NC saturating well    CV  #Shock state  - initially  vasoplegia from sedation, now with distributive septic shock  -  on vasopressin 0.06  - lactate cleared  - c/w vancomycin  to address possible underlying infection  -ID following    #VT/vfib s/p shock  #Known LBBB with first degree AV block  - Monitor on tele  - Trend lytes    #2:1 AV block with 1st degree AV block  #Severe AS s/p TAVR  - s/p Micra 4/25, no longer requiring TVP (eventual candidate for CRT per EP)  - c/w ASA, atorvastatin     # HFrEF  - EF 25%  - Holding GDMT while on pressors    /RENAL  # DUNCAN on CKD requiring HD   - CRRT  - Strict I/Os  - Trend BMP, lytes  - Avoid nephrotoxins    GI  - resumed diet    ENDO  # DM1  # DKA  - s/p Insulin drip  - transitioned to basal/bolus    # Hypothyroidism  - c/w home levothyroxine     SKIN  # Acute generalized exanthematous pustulosis   - c/w calamine,  Vaseline    # Psoriasis   # Lymphedema   - Elevate legs  - Compression stockings, ACE wrapping    ID  - Monitor WBC and for fevers  -pos for staph E. bacteremia  - c/w Vancomycin and dose to >15 vanc trough  -ID following    # Oral Candidiasis  - c/w nystatin     Mobilize out of bed  once off CRRT and pressors.     Patient requires continuous monitoring with bedside rhythm monitoring, pulse ox monitoring, and intermittent blood gas analysis. Care plan discussed with ICU care team. Patient remained critical and at risk for life threatening decompensation.  Patient seen, examined and plan discussed with CCU team during rounds.    Assessment:  73F PMH: HFrEF (EF 30%), AS, LBBB, HTN, DM1, CKD, hypothyroidism, chronic lymphedema, LELIA (3L home O2) p/w for syncope 2/2 severe AS, s/p TAVR 4/24. Course complicated by contrast induced allergic reaction (had CTA for TAVR eval) and contrast related renal failure (acute on chronic) requiring HD. TAVR c/b VT and vfib requiring shock and flash pulmonary edema causing AHRF requiring intubation. Currently on HD for fluid overload, on pressor support for distributive septic shock.    NEURO:  - Extubated 4/26  - Currently AOx4    PULM  # AHRF  2/2 flash pulmonary edema  # LELIA (on 3L home oxygen)  - Extubated 4/26  - intermittently requiring nasal cannula    CV  #Shock state  - initially vasoplegia from sedation, now suspect pressor requirement is secondary to losing fluid from CVVHD  - on vasopressin 0.05  - lactate cleared  - c/w vancomycin to address possible underlying infection  - ID following    #VT/vfib s/p shock  #2:1 AV block  #Severe AS s/p TAVR  - Known LBBB with first degree AV block on telemetry  - s/p Micra 4/25, no longer requiring TVP (eventual candidate for CRT per EP)  - c/w ASA, atorvastatin     # HFrEF  - EF 25%  - Holding GDMT while on pressors    /RENAL  #ESRD  - from ATN and contrast nephropathy  - CRRT, increase rate to 250cc/hr given persistent volume overload  - pressor support PRN for volume removal  - Strict I/Os  - Trend BMP, lytes  - Avoid nephrotoxins    GI  - resumed diet    ENDO  # DM1  # DKA  - s/p Insulin drip  - transitioned to basal/bolus  - monitor POCT FS    # Hypothyroidism  - c/w home levothyroxine     SKIN  # Acute generalized exanthematous pustulosis   - c/w calamine,  Vaseline    # Psoriasis   # Lymphedema   # R. Diabetic foot ulcer  - Elevate legs  - Compression stockings, ACE wrapping  - Podiatry following, appreciate recs    ID  - Monitor WBC and for fevers  - pos for staph E. bacteremia (likely contaminant 1 out of 4 bottles)  - c/w Vancomycin for now and dose to >15 vanc trough  - ID following    # Oral Candidiasis  - c/w nystatin     Mobilize out of bed once off CRRT and pressors.     Patient requires continuous monitoring with bedside rhythm monitoring, pulse ox monitoring, and intermittent blood gas analysis. Care plan discussed with ICU care team. Patient remained critical and at risk for life threatening decompensation.  Patient seen, examined and plan discussed with CCU team during rounds.

## 2024-04-28 NOTE — PROGRESS NOTE ADULT - SUBJECTIVE AND OBJECTIVE BOX
EVELYN LOPEZ  MRN-[id number]  Patient is a 73y old  Female who presents with a chief complaint of Syncope (28 Apr 2024 19:16)    HPI:  73F w HFrEF (EF 30%), mod AS, known LBBB, HTN, IDDM1, CKD, hypothyroidism, chronic lymphedema, suspected OHS/LELIA on 3L NC home O2 p/w 1 episode of syncope. Recent admission at [facility] (3/29-4/5) for ADHF and DUNCAN s/p diuresis, discharged with new home O2 3L NC suspecting OHS/LELIA pending outpatient w/u. Since discharge a week ago, she has been staying at home with   Pt had sob walking to car. Once in car, she was still breathing heavily. Partner noticed pt was not responding to verbal stimuli for 10-15sec and witnessed R arm jerking. Pt gained consciousness quickly without postictal symptoms. Pt went to Cardiologist Dr. Nathan who recommended pt to come to ED. No fever, cp, abd pain, n/v. Leg swelling is improved from prior. Pt on torsemide 40mg which she has been taking. Pt was discharged on 3LNC which she is currently on. Patient reports she did not take Farxiga that she was discharged on as she was concerned about side effects.     In ED, patient was found afebrile, hemodynamically stable, breathing well on 3L NC. Initial labs notable for mild hyponatremia, DUNCAN on CKD, elevated proBNP and elevated pCO2 both improved from prior. (12 Apr 2024 16:31)      Hospital Course:    24 HOUR EVENTS:    REVIEW OF SYSTEMS:    CONSTITUTIONAL: No weakness, fevers or chills  EYES/ENT: No visual changes;  No vertigo or throat pain   NECK: No pain or stiffness  RESPIRATORY: No cough, wheezing, hemoptysis; No shortness of breath  CARDIOVASCULAR: No chest pain or palpitations  GASTROINTESTINAL: No abdominal or epigastric pain. No nausea, vomiting, or hematemesis; No diarrhea or constipation. No melena or hematochezia.  GENITOURINARY: No dysuria, frequency or hematuria  NEUROLOGICAL: No numbness or weakness  SKIN: No itching, rashes      ICU Vital Signs Last 24 Hrs  T(C): 37.2 (28 Apr 2024 19:00), Max: 37.3 (28 Apr 2024 08:00)  T(F): 99 (28 Apr 2024 19:00), Max: 99.1 (28 Apr 2024 08:00)  HR: 79 (28 Apr 2024 22:15) (75 - 100)  BP: 79/46 (28 Apr 2024 19:45) (79/46 - 79/46)  BP(mean): 58 (28 Apr 2024 19:45) (58 - 58)  ABP: 114/39 (28 Apr 2024 22:15) (84/31 - 116/43)  ABP(mean): 65 (28 Apr 2024 22:15) (47 - 70)  RR: 19 (28 Apr 2024 22:15) (12 - 26)  SpO2: 99% (28 Apr 2024 22:15) (90% - 100%)    O2 Parameters below as of 28 Apr 2024 22:00  Patient On (Oxygen Delivery Method): nasal cannula  O2 Flow (L/min): 2          CVP(mm Hg): --  CO: --  CI: --  PA: --  PA(mean): --  PA(direct): --  PCWP: --  LA: --  RA: --  SVR: --  SVRI: --  PVR: --  PVRI: --    POCT Blood Glucose.: 172 mg/dL (04-28-24 @ 21:12)  POCT Blood Glucose.: 171 mg/dL (04-28-24 @ 16:25)  POCT Blood Glucose.: 212 mg/dL (04-28-24 @ 12:05)  POCT Blood Glucose.: 237 mg/dL (04-28-24 @ 07:52)    CAPILLARY BLOOD GLUCOSE      POCT Blood Glucose.: 172 mg/dL (28 Apr 2024 21:12)      PHYSICAL EXAM:  GENERAL: No acute distress, well-developed  HEAD:  Atraumatic, Normocephalic  EYES: EOMI, PERRLA, conjunctiva and sclera clear  NECK: Supple, no lymphadenopathy, no JVD  CHEST/LUNG: CTAB; No wheezes, rales, or rhonchi  HEART: Regular rate and rhythm. Normal S1/S2. No murmurs, rubs, or gallops  ABDOMEN: Soft, non-tender, non-distended; normal bowel sounds, no organomegaly  EXTREMITIES:  2+ peripheral pulses b/l, No clubbing, cyanosis, or edema  NEUROLOGY: A&O x 3, no focal deficits  SKIN: No rashes or lesions    ============================I/O===========================   I&O's Detail    27 Apr 2024 07:01  -  28 Apr 2024 07:00  --------------------------------------------------------  IN:    IV PiggyBack: 500 mL    Oral Fluid: 770 mL    Vasopressin: 58.5 mL    Vasopressin: 7.5 mL    Vasopressin: 60 mL    Vasopressin: 9 mL  Total IN: 1405 mL    OUT:    Other (mL): 4302 mL  Total OUT: 4302 mL    Total NET: -2897 mL      28 Apr 2024 07:01  -  28 Apr 2024 22:25  --------------------------------------------------------  IN:    IV PiggyBack: 62.5 mL    Oral Fluid: 1110 mL    Vasopressin: 103.5 mL  Total IN: 1276 mL    OUT:    Other (mL): 4152 mL  Total OUT: 4152 mL    Total NET: -2876 mL        ============================ LABS =========================                        7.9    13.46 )-----------( 70       ( 28 Apr 2024 00:50 )             26.9     04-28    133<L>  |  101  |  14  ----------------------------<  171<H>  4.2   |  23  |  1.33<H>    Ca    7.7<L>      28 Apr 2024 16:31  Phos  1.7     04-28  Mg     2.4     04-28    TPro  5.8<L>  /  Alb  2.8<L>  /  TBili  0.6  /  DBili  x   /  AST  18  /  ALT  11  /  AlkPhos  77  04-28                LIVER FUNCTIONS - ( 28 Apr 2024 16:31 )  Alb: 2.8 g/dL / Pro: 5.8 g/dL / ALK PHOS: 77 U/L / ALT: 11 U/L / AST: 18 U/L / GGT: x           PT/INR - ( 28 Apr 2024 00:50 )   PT: 11.7 sec;   INR: 1.07 ratio         PTT - ( 28 Apr 2024 00:50 )  PTT:35.4 sec  ABG - ( 28 Apr 2024 00:47 )  pH, Arterial: 7.35  pH, Blood: x     /  pCO2: 47    /  pO2: 148   / HCO3: 26    / Base Excess: -0.2  /  SaO2: 99.7              Blood Gas Arterial, Lactate: 1.1 mmol/L (04-28-24 @ 00:47)    Urinalysis Basic - ( 28 Apr 2024 16:31 )    Color: x / Appearance: x / SG: x / pH: x  Gluc: 171 mg/dL / Ketone: x  / Bili: x / Urobili: x   Blood: x / Protein: x / Nitrite: x   Leuk Esterase: x / RBC: x / WBC x   Sq Epi: x / Non Sq Epi: x / Bacteria: x      ======================Micro/Rad/Cardio=================  Telemtry: Reviewed   EKG: Reviewed  CXR: Reviewed  Culture: Reviewed   Echo:   Cath:   ======================================================  PAST MEDICAL & SURGICAL HISTORY:  Hypertension      Diabetes      Lymphedema      H/O left bundle branch block      History of left bundle branch block (LBBB)      Systolic heart failure, chronic      Type 1 diabetes      H/O cataract  2020      History of surgical removal of meniscus of knee  left in 1971      Frozen shoulder  2000      H/O Achilles tendon repair  lengthened bilaterally, 2000      Fractured skull  1968        ====================ASSESSMENT ==============            Plan:  ====================CARDIOVASCULAR==================    Mechaincal Circulatory Support [ ] IABP,  [ ] Impella 2.5,  [ ] Impella CP  Settings:     ==Hemodynamics==    CVP:   PCWP:  PA S/D:   Cardiac Output:  Cardiac Index:   SVR:      ==Coronaries==    Last ischemic workup:   Antiplatelet regimen:   Anticoagulant:   Statin:   Beta blocker:      ==Pump==    LVEF:                              Regional Wall Motion Abnormaility?:  [ ]Yes   [ ] No, If Yes, Details  Diastolic function:  RV function:   Any change frim prior?: [ ] Yes   [ ] No, If Yes, Details:       ==Rhythm==    Current rhythm:  AM EKG Interpretation:   Anti-arrhythmic therapies:   TVP with settings:         ====================== NEUROLOGY=====================    ==================== RESPIRATORY======================  Mechanical Ventilation:    ABG - ( 28 Apr 2024 00:47 )  pH, Arterial: 7.35  pH, Blood: x     /  pCO2: 47    /  pO2: 148   / HCO3: 26    / Base Excess: -0.2  /  SaO2: 99.7                  ===================== RENAL =========================    04-27-24 @ 07:01 - 04-28-24 @ 07:00  --------------------------------------------------------  IN: 1405 mL / OUT: 4302 mL / NET: -2897 mL    04-28-24 @ 07:01  -  04-28-24 @ 22:25  --------------------------------------------------------  IN: 1276 mL / OUT: 4152 mL / NET: -2876 mL      Renal Replacement Therapy:  [ ] CRRT      [ ] IHD, Last Session:    Fluid removal:     [ ] Diuretic therapy, Regimen:       ==================== GASTROINTESTINAL===================    Last BM:   Indication for Stress Ulcer Prophylaxis, [ ] Yes    [ ] No   If Yes, Medication:     ascorbic acid 500 milliGRAM(s) Oral daily  ferrous    sulfate 325 milliGRAM(s) Oral two times a day  multivitamin 1 Tablet(s) Oral daily  polyethylene glycol 3350 17 Gram(s) Oral two times a day PRN Constipation  senna 2 Tablet(s) Oral at bedtime    ========================INFECTIOUS DISEASE================  T(C): 37.2 (04-28-24 @ 19:00), Max: 37.3 (04-28-24 @ 08:00)  WBC Count: 13.46 K/uL (04-28-24 @ 00:50)  WBC Count: 9.96 K/uL (04-27-24 @ 00:34)  WBC Count: 17.42 K/uL (04-26-24 @ 00:44)      Culture - Blood (collected 04-27-24 @ 12:20)  Source: .Blood Blood  Preliminary Report (04-28-24 @ 19:02):    No growth at 24 hours    Culture - Blood (collected 04-27-24 @ 12:19)  Source: .Blood Blood  Preliminary Report (04-28-24 @ 19:02):    No growth at 24 hours    Culture - Blood (collected 04-25-24 @ 10:15)  Source: .Blood Blood  Gram Stain (04-26-24 @ 21:56):    Growth in aerobic bottle: Gram Positive Cocci in Clusters  Final Report (04-27-24 @ 14:26):    Growth in aerobic bottle: Staphylococcus epidermidis    Isolation of Coagulase negative Staphylococcus from single blood culture    sets may represent    contamination. Contact the Microbiology Department at [phone] if    susceptibility testing is    clinically indicated.    Direct identification is available within approximately 3-5    hours either by Blood Panel Multiplexed PCR or Direct    MALDI-TOF. Details: [email or web address]  Organism: Blood Culture PCR (04-27-24 @ 14:26)  Organism: Blood Culture PCR (04-27-24 @ 14:26)      Method Type: PCR      -  Staphylococcus epidermidis, Methicillin resistant: Detec    Culture - Blood (collected 04-25-24 @ 10:15)  Source: .Blood Blood  Preliminary Report (04-28-24 @ 18:01):    No growth at 72 Hours        Current Antibiotics with start date:     nystatin    Suspension 959274 Unit(s) Oral four times a day    ===================HEMATOLOGIC/ONC ===================  Hemoglobin: 7.9 g/dL (04-28-24 @ 00:50)  Hemoglobin: 7.8 g/dL (04-27-24 @ 00:34)  Hemoglobin: 8.9 g/dL (04-26-24 @ 00:44)    Platelet Count - Automated: 70 K/uL (04-28-24 @ 00:50)  Platelet Count - Automated: 74 K/uL (04-27-24 @ 00:34)  Platelet Count - Automated: 107 K/uL (04-26-24 @ 00:44)    Chemical VTE Prophylaxis:  [ ] Lovenox    [ ] SQH   [ ]NA  Systemic Anticogaulation:  [ ] Yes    [ ] No,  If Yes, Medication:     aspirin  chewable 81 milliGRAM(s) Oral daily  heparin   Injectable 5000 Unit(s) SubCutaneous every 8 hours  heparin  Infusion Syringe 300 Unit(s)/Hr (0.6 mL/Hr) CRRT <Continuous>    =======================    ENDOCRINE  =====================  POCT Blood Glucose.: 172 mg/dL (04-28-24 @ 21:12)  POCT Blood Glucose.: 171 mg/dL (04-28-24 @ 16:25)  POCT Blood Glucose.: 212 mg/dL (04-28-24 @ 12:05)  POCT Blood Glucose.: 237 mg/dL (04-28-24 @ 07:52)        atorvastatin 80 milliGRAM(s) Oral at bedtime  insulin glargine Injectable (LANTUS) 24 Unit(s) SubCutaneous at bedtime  insulin lispro (ADMELOG) corrective regimen sliding scale   SubCutaneous Before meals and at bedtime  insulin lispro Injectable (ADMELOG) 4 Unit(s) SubCutaneous three times a day before meals  levothyroxine 75 MICROGram(s) Oral daily  vasopressin Infusion 0.02 Unit(s)/Min (3 mL/Hr) IV Continuous <Continuous>      Patient requires continuous monitoring with bedside rhythm monitoring, pulse ox monitoring, and intermittent blood gas analysis. Care plan discussed with ICU care team. Patient remained critical and at risk for life threatening decompensation.  Patient seen, examined and plan discussed with CCU team during rounds.       I have personally provided __30__ minutes of critical care time excluding time spent on separate procedures, in addition to initial critical care time provided by the CICU Attending, Dr. Quintero.       EVELYN LOPEZ  MRN-[id number]  Patient is a 73y old  Female who presents with a chief complaint of Syncope (28 Apr 2024 19:16)    HPI:  73F w HFrEF (EF 30%), mod AS, known LBBB, HTN, IDDM1, CKD, hypothyroidism, chronic lymphedema, suspected OHS/LELIA on 3L NC home O2 p/w 1 episode of syncope. Recent admission at [facility] (3/29-4/5) for ADHF and DUNCAN s/p diuresis, discharged with new home O2 3L NC suspecting OHS/LELIA pending outpatient w/u. Since discharge a week ago, she has been staying at home with   Pt had sob walking to car. Once in car, she was still breathing heavily. Partner noticed pt was not responding to verbal stimuli for 10-15sec and witnessed R arm jerking. Pt gained consciousness quickly without postictal symptoms. Pt went to Cardiologist Dr. Nathan who recommended pt to come to ED. No fever, cp, abd pain, n/v. Leg swelling is improved from prior. Pt on torsemide 40mg which she has been taking. Pt was discharged on 3LNC which she is currently on. Patient reports she did not take Farxiga that she was discharged on as she was concerned about side effects.     In ED, patient was found afebrile, hemodynamically stable, breathing well on 3L NC. Initial labs notable for mild hyponatremia, DUNCAN on CKD, elevated proBNP and elevated pCO2 both improved from prior. (12 Apr 2024 16:31)      Hospital Course:    24 HOUR EVENTS:    REVIEW OF SYSTEMS:    CONSTITUTIONAL: No weakness, fevers or chills  EYES/ENT: No visual changes;  No vertigo or throat pain   NECK: No pain or stiffness  RESPIRATORY: No cough, wheezing, hemoptysis; No shortness of breath  CARDIOVASCULAR: No chest pain or palpitations  GASTROINTESTINAL: No abdominal or epigastric pain. No nausea, vomiting, or hematemesis; No diarrhea or constipation. No melena or hematochezia.  GENITOURINARY: No dysuria, frequency or hematuria  NEUROLOGICAL: No numbness or weakness  SKIN: No itching, rashes      ICU Vital Signs Last 24 Hrs  T(C): 37.2 (28 Apr 2024 19:00), Max: 37.3 (28 Apr 2024 08:00)  T(F): 99 (28 Apr 2024 19:00), Max: 99.1 (28 Apr 2024 08:00)  HR: 79 (28 Apr 2024 22:15) (75 - 100)  BP: 79/46 (28 Apr 2024 19:45) (79/46 - 79/46)  BP(mean): 58 (28 Apr 2024 19:45) (58 - 58)  ABP: 114/39 (28 Apr 2024 22:15) (84/31 - 116/43)  ABP(mean): 65 (28 Apr 2024 22:15) (47 - 70)  RR: 19 (28 Apr 2024 22:15) (12 - 26)  SpO2: 99% (28 Apr 2024 22:15) (90% - 100%)    O2 Parameters below as of 28 Apr 2024 22:00  Patient On (Oxygen Delivery Method): nasal cannula  O2 Flow (L/min): 2      POCT Blood Glucose.: 172 mg/dL (04-28-24 @ 21:12)  POCT Blood Glucose.: 171 mg/dL (04-28-24 @ 16:25)  POCT Blood Glucose.: 212 mg/dL (04-28-24 @ 12:05)  POCT Blood Glucose.: 237 mg/dL (04-28-24 @ 07:52)    CAPILLARY BLOOD GLUCOSE      POCT Blood Glucose.: 172 mg/dL (28 Apr 2024 21:12)      PHYSICAL EXAM:  GENERAL: No acute distress, well-developed  HEAD:  Atraumatic, Normocephalic  EYES: EOMI, PERRLA, conjunctiva and sclera clear  NECK: Supple, no lymphadenopathy, no JVD  CHEST/LUNG: CTAB; No wheezes, rales, or rhonchi  HEART: Regular rate and rhythm. Normal S1/S2. No murmurs, rubs, or gallops  ABDOMEN: Soft, non-tender, non-distended; normal bowel sounds, no organomegaly  EXTREMITIES:  2+ peripheral pulses b/l, No clubbing, cyanosis, or edema  NEUROLOGY: A&O x 3, no focal deficits  SKIN: No rashes or lesions    ============================I/O===========================   I&O's Detail    27 Apr 2024 07:01  -  28 Apr 2024 07:00  --------------------------------------------------------  IN:    IV PiggyBack: 500 mL    Oral Fluid: 770 mL    Vasopressin: 58.5 mL    Vasopressin: 7.5 mL    Vasopressin: 60 mL    Vasopressin: 9 mL  Total IN: 1405 mL    OUT:    Other (mL): 4302 mL  Total OUT: 4302 mL    Total NET: -2897 mL      28 Apr 2024 07:01  -  28 Apr 2024 22:25  --------------------------------------------------------  IN:    IV PiggyBack: 62.5 mL    Oral Fluid: 1110 mL    Vasopressin: 103.5 mL  Total IN: 1276 mL    OUT:    Other (mL): 4152 mL  Total OUT: 4152 mL    Total NET: -2876 mL        ============================ LABS =========================                        7.9    13.46 )-----------( 70       ( 28 Apr 2024 00:50 )             26.9     04-28    133<L>  |  101  |  14  ----------------------------<  171<H>  4.2   |  23  |  1.33<H>    Ca    7.7<L>      28 Apr 2024 16:31  Phos  1.7     04-28  Mg     2.4     04-28    TPro  5.8<L>  /  Alb  2.8<L>  /  TBili  0.6  /  DBili  x   /  AST  18  /  ALT  11  /  AlkPhos  77  04-28                LIVER FUNCTIONS - ( 28 Apr 2024 16:31 )  Alb: 2.8 g/dL / Pro: 5.8 g/dL / ALK PHOS: 77 U/L / ALT: 11 U/L / AST: 18 U/L / GGT: x           PT/INR - ( 28 Apr 2024 00:50 )   PT: 11.7 sec;   INR: 1.07 ratio         PTT - ( 28 Apr 2024 00:50 )  PTT:35.4 sec  ABG - ( 28 Apr 2024 00:47 )  pH, Arterial: 7.35  pH, Blood: x     /  pCO2: 47    /  pO2: 148   / HCO3: 26    / Base Excess: -0.2  /  SaO2: 99.7              Blood Gas Arterial, Lactate: 1.1 mmol/L (04-28-24 @ 00:47)    Urinalysis Basic - ( 28 Apr 2024 16:31 )    Color: x / Appearance: x / SG: x / pH: x  Gluc: 171 mg/dL / Ketone: x  / Bili: x / Urobili: x   Blood: x / Protein: x / Nitrite: x   Leuk Esterase: x / RBC: x / WBC x   Sq Epi: x / Non Sq Epi: x / Bacteria: x      ======================Micro/Rad/Cardio=================  Telemtry: Reviewed   EKG: Reviewed  CXR: Reviewed  Culture: Reviewed   Echo:   Cath:   ======================================================  PAST MEDICAL & SURGICAL HISTORY:  Hypertension      Diabetes      Lymphedema      H/O left bundle branch block      History of left bundle branch block (LBBB)      Systolic heart failure, chronic      Type 1 diabetes      H/O cataract  2020      History of surgical removal of meniscus of knee  left in 1971      Frozen shoulder  2000      H/O Achilles tendon repair  lengthened bilaterally, 2000      Fractured skull  1968        ====================ASSESSMENT ==============  72 y/o female w/ PMH: HFrEF (EF 30%), AS, LBBB, HTN, IDDM1, CKD, hypothyroidism, chronic lymphedema, suspected OHS/LELIA (3L NC home O2) p/w 1 episode of syncope most likely 2/2 moderate to severe AS, admitted to floors for TAVR w/u. Course complicated by suspected contrast induced allergic reaction (had CTA for TAVR eval) and contrast related renal failure (acute on chronic) requiring HD. Pt. went for TAVR on 2/24 which was c/b VT/VF requiring shock and flash pulmonary edema causing AHRF requiring intubation. Currently undergoing HD for hyperkalemia and fluid overload. Currently on pressor support for possible vasoplegic shock, requiring management in CICU.    NEURO:  - Extubated 4/26  - Currently AOx4  -no focal deficits     PULM  # Acute hypoxic respiratory failure most likely 2/2 flash pulmonary edema, resolved   # OHS/LELIA (on home oxygen)  - currently on 3L NC saturating well  - monitor status    CV  #Shock state  - initially suspected vasoplegia from sedation, now with persistent pressor requirement is there component of sepsis or hypovolemia from CRRT? less likely obstructive or cardiogenic  - currently on vasopressin, wean as tolerated keeping MAP>65   - lactate cleared  - will start empiric antibiotics with vancomycin for now to address possible underlying infection    #VT/vfib s/p shock  #Known LBBB with first degree AV block  - Monitor on tele  - Trend lytes    #2:1 AV block with 1st degree AV block  #Severe AS s/p TAVR  - s/p Micra 4/25, no longer requiring TVP (eventual candidate for CRT per EP?)  - c/w ASA  - c/w atorvastatin     # HFrEF  - EF 25%  - Holding GDMT while on pressors    /RENAL  # DUNCAN on CKD requiring HD c/b hyperkalemia  - CRRT, removing 250ml/hr, tolerating well   - Strict I/Os  - Trend BMP, lytes  - Avoid nephrotoxins    GI  - resumed diet    ENDO  # IDDM1  # DKA  - s/p Insulin drip  - transitioned to basal/bolus    # Hypothyroidism  - c/w home levothyroxine     SKIN  # Acute generalized exanthematous pustulosis due to drug  - c/w calamine  - c/w vaseline    # Psoriasis   # Lymphedema   - Elevate legs  - Compression stockings, ACE wrapping    ID  Prelim BCx drawn 4/25 Gram + cocci in clusters in aerobic bottle  - Monitor WBC and for fevers    # Oral Candidiasis  - c/w nystatin       Patient requires continuous monitoring with bedside rhythm monitoring, pulse ox monitoring, and intermittent blood gas analysis. Care plan discussed with ICU care team. Patient remained critical and at risk for life threatening decompensation.  Patient seen, examined and plan discussed with CCU team during rounds.       I have personally provided __30__ minutes of critical care time excluding time spent on separate procedures, in addition to initial critical care time provided by the CICU Attending, Dr. Quintero.

## 2024-04-28 NOTE — PROGRESS NOTE ADULT - SUBJECTIVE AND OBJECTIVE BOX
PATIENT:  EVELYN LOPEZ  [id number]    CHIEF COMPLAINT:  Patient is a 73y old  Female who presents with a chief complaint of Syncope (2024 06:54)      INTERVAL HISTORY/OVERNIGHT EVENTS:      REVIEW OF SYSTEMS:    Constitutional:     [ ] negative [ ] fevers [ ] chills [ ] weight loss [ ] weight gain  HEENT:                  [ ] negative [ ] dry eyes [ ] eye irritation [ ] postnasal drip [ ] nasal congestion  CV:                         [ ] negative  [ ] chest pain [ ] orthopnea [ ] palpitations [ ] murmur  Resp:                     [ ] negative [ ] cough [ ] shortness of breath [ ] dyspnea [ ] wheezing [ ] sputum [ ] hemoptysis  GI:                          [ ] negative [ ] nausea [ ] vomiting [ ] diarrhea [ ] constipation [ ] abd pain [ ] dysphagia   :                        [ ] negative [ ] dysuria [ ] nocturia [ ] hematuria [ ] increased urinary frequency  Musculoskeletal: [ ] negative [ ] back pain [ ] myalgias [ ] arthralgias [ ] fracture  Skin:                       [ ] negative [ ] rash [ ] itch  Neurological:        [ ] negative [ ] headache [ ] dizziness [ ] syncope [ ] weakness [ ] numbness  Psychiatric:           [ ] negative [ ] anxiety [ ] depression  Endocrine:            [ ] negative [ ] diabetes [ ] thyroid problem  Heme/Lymph:      [ ] negative [ ] anemia [ ] bleeding problem  Allergic/Immune: [ ] negative [ ] itchy eyes [ ] nasal discharge [ ] hives [ ] angioedema    [ ] All other systems negative  [ ] Unable to assess ROS because ________.    MEDICATIONS:  MEDICATIONS  (STANDING):  ascorbic acid 500 milliGRAM(s) Oral daily  aspirin  chewable 81 milliGRAM(s) Oral daily  atorvastatin 80 milliGRAM(s) Oral at bedtime  calamine/zinc oxide Lotion 1 Application(s) Topical three times a day  chlorhexidine 2% Cloths 1 Application(s) Topical daily  chlorhexidine 4% Liquid 1 Application(s) Topical <User Schedule>  CRRT Treatment    <Continuous>  ferrous    sulfate 325 milliGRAM(s) Oral two times a day  heparin   Injectable 5000 Unit(s) SubCutaneous every 8 hours  heparin  Infusion Syringe 300 Unit(s)/Hr (0.6 mL/Hr) CRRT <Continuous>  insulin glargine Injectable (LANTUS) 21 Unit(s) SubCutaneous at bedtime  insulin lispro (ADMELOG) corrective regimen sliding scale   SubCutaneous Before meals and at bedtime  insulin lispro Injectable (ADMELOG) 4 Unit(s) SubCutaneous three times a day before meals  levothyroxine 75 MICROGram(s) Oral daily  multivitamin 1 Tablet(s) Oral daily  nystatin    Suspension 071538 Unit(s) Oral four times a day  petrolatum white Ointment 1 Application(s) Topical three times a day  potassium phosphate / sodium phosphate Powder (PHOS-NaK) 1 Packet(s) Oral three times a day with meals  potassium phosphate / sodium phosphate Powder (PHOS-NaK) 1 Packet(s) Oral two times a day  PrismaSATE Dialysate BGK 4 / 2.5 5000 milliLiter(s) (1400 mL/Hr) CRRT <Continuous>  PrismaSOL Filtration BGK 0 / 2.5 5000 milliLiter(s) (1000 mL/Hr) CRRT <Continuous>  PrismaSOL Filtration BGK 4 / 2.5 5000 milliLiter(s) (300 mL/Hr) CRRT <Continuous>  senna 2 Tablet(s) Oral at bedtime  vancomycin  IVPB 1250 milliGRAM(s) IV Intermittent every 12 hours  vasopressin Infusion 0.02 Unit(s)/Min (3 mL/Hr) IV Continuous <Continuous>    MEDICATIONS  (PRN):  polyethylene glycol 3350 17 Gram(s) Oral two times a day PRN Constipation      ALLERGIES:  Allergies    contrast media (iodine-based) (Fever; Vomiting; Flushing; Hypotension; Rash; Stomach Upset)  Ativan (Rash; Urticaria)  penicillins (Hives (Mod to Severe); Anaphylaxis (Mod to Severe); Short breath (Mod to Severe); Angioedema (Mod to Severe))    Intolerances        OBJECTIVE:  ICU Vital Signs Last 24 Hrs  T(C): 36.8 (2024 03:00), Max: 36.9 (2024 16:00)  T(F): 98.2 (2024 03:00), Max: 98.4 (2024 16:00)  HR: 75 (2024 06:45) (75 - 100)  BP: --  BP(mean): --  ABP: 109/37 (2024 06:45) (85/36 - 121/52)  ABP(mean): 59 (2024 06:45) (51 - 75)  RR: 21 (2024 06:45) (8 - 27)  SpO2: 92% (2024 06:45) (90% - 100%)    O2 Parameters below as of 2024 06:00  Patient On (Oxygen Delivery Method): room air            Adult Advanced Hemodynamics Last 24 Hrs  CVP(mm Hg): --  CVP(cm H2O): --  CO: --  CI: --  PA: --  PA(mean): --  PCWP: --  SVR: --  SVRI: --  PVR: --  PVRI: --  CAPILLARY BLOOD GLUCOSE      POCT Blood Glucose.: 131 mg/dL (2024 21:47)  POCT Blood Glucose.: 94 mg/dL (2024 16:43)  POCT Blood Glucose.: 96 mg/dL (2024 11:36)    CAPILLARY BLOOD GLUCOSE      POCT Blood Glucose.: 131 mg/dL (2024 21:47)    I&O's Summary    2024 07:01  -  2024 07:00  --------------------------------------------------------  IN: 1146 mL / OUT: 4060 mL / NET: -2914 mL      Daily     Daily     PHYSICAL EXAMINATION:  General: WN/WD NAD  HEENT: PERRLA, EOMI, moist mucous membranes  Neurology: A&Ox3, nonfocal, PEREZ x 4  Respiratory: CTA B/L, normal respiratory effort, no wheezes, crackles, rales  CV: RRR, S1S2, no murmurs, rubs or gallops  Abdominal: Soft, NT, ND +BS, Last BM  Extremities: No edema, + peripheral pulses  Incisions:   Tubes:    LABS:  ABG - ( 2024 00:47 )  pH, Arterial: 7.35  pH, Blood: x     /  pCO2: 47    /  pO2: 148   / HCO3: 26    / Base Excess: -0.2  /  SaO2: 99.7                                    7.9    13.46 )-----------( 70       ( 2024 00:50 )             26.9     04-    135  |  103  |  16  ----------------------------<  136<H>  4.0   |  25  |  1.52<H>    Ca    7.3<L>      2024 00:50  Phos  1.8       Mg     2.3         TPro  5.2<L>  /  Alb  2.6<L>  /  TBili  0.6  /  DBili  x   /  AST  17  /  ALT  9<L>  /  AlkPhos  65      LIVER FUNCTIONS - ( 2024 00:50 )  Alb: 2.6 g/dL / Pro: 5.2 g/dL / ALK PHOS: 65 U/L / ALT: 9 U/L / AST: 17 U/L / GGT: x           PT/INR - ( 2024 00:50 )   PT: 11.7 sec;   INR: 1.07 ratio         PTT - ( 2024 00:50 )  PTT:35.4 sec        Urinalysis Basic - ( 2024 00:50 )    Color: x / Appearance: x / SG: x / pH: x  Gluc: 136 mg/dL / Ketone: x  / Bili: x / Urobili: x   Blood: x / Protein: x / Nitrite: x   Leuk Esterase: x / RBC: x / WBC x   Sq Epi: x / Non Sq Epi: x / Bacteria: x        TELEMETRY:     EK Lead ECG:   Systolic  mmHg    Diastolic BP 37 mmHg    Ventricular Rate 72 BPM    Atrial Rate 72 BPM    P-R Interval 308 ms    QRS Duration 186 ms    Q-T Interval 474 ms    QTC Calculation(Bazett) 519 ms    P Axis -9 degrees    R Axis -69 degrees    T Axis 140 degrees    Diagnosis Line SINUS RHYTHM WITH 1ST DEGREE A-V BLOCK  LEFT AXIS DEVIATION  LEFT BUNDLE BRANCH BLOCK  ABNORMAL ECG  WHEN COMPARED WITH ECG OF 2024    Confirmed by MD SEVERINO JONATHAN ([id number]) on 2024 5:25:27 PM (24 @ 05:43)      IMAGING:  Echo:        LVSF:        EF:        RVSF:        LA:        RA:        Mitral Valve:        Aortic Valve:       Tricuspid Valve:        Pulmonic Valve:        Pericardium:     ASSESSMENT & PLAN:       PATIENT:  EVELYN LOPEZ  [id number]    CHIEF COMPLAINT:  Patient is a 73y old  Female who presents with a chief complaint of Syncope (2024 06:54)      INTERVAL HISTORY/OVERNIGHT EVENTS:  Restarted vasopressin overnight. Does not report any lightheadedness, chest pain, or shortness of breath.       REVIEW OF SYSTEMS:    Constitutional:     [ ] negative [ ] fevers [ ] chills [ ] weight loss [ ] weight gain  HEENT:                  [ ] negative [ ] dry eyes [ ] eye irritation [ ] postnasal drip [ ] nasal congestion  CV:                         [ ] negative  [ ] chest pain [ ] orthopnea [ ] palpitations [ ] murmur  Resp:                     [ ] negative [ ] cough [ ] shortness of breath [ ] dyspnea [ ] wheezing [ ] sputum [ ] hemoptysis  GI:                          [ ] negative [ ] nausea [ ] vomiting [ ] diarrhea [ ] constipation [ ] abd pain [ ] dysphagia   :                        [ ] negative [ ] dysuria [ ] nocturia [ ] hematuria [ ] increased urinary frequency  Musculoskeletal: [ ] negative [ ] back pain [ ] myalgias [ ] arthralgias [ ] fracture  Skin:                       [ ] negative [ ] rash [ ] itch  Neurological:        [ ] negative [ ] headache [ ] dizziness [ ] syncope [ ] weakness [ ] numbness  Psychiatric:           [ ] negative [ ] anxiety [ ] depression  Endocrine:            [ ] negative [ ] diabetes [ ] thyroid problem  Heme/Lymph:      [ ] negative [ ] anemia [ ] bleeding problem  Allergic/Immune: [ ] negative [ ] itchy eyes [ ] nasal discharge [ ] hives [ ] angioedema    [x] All other systems negative  [ ] Unable to assess ROS because ________.    MEDICATIONS:  MEDICATIONS  (STANDING):  ascorbic acid 500 milliGRAM(s) Oral daily  aspirin  chewable 81 milliGRAM(s) Oral daily  atorvastatin 80 milliGRAM(s) Oral at bedtime  calamine/zinc oxide Lotion 1 Application(s) Topical three times a day  chlorhexidine 2% Cloths 1 Application(s) Topical daily  chlorhexidine 4% Liquid 1 Application(s) Topical <User Schedule>  CRRT Treatment    <Continuous>  ferrous    sulfate 325 milliGRAM(s) Oral two times a day  heparin   Injectable 5000 Unit(s) SubCutaneous every 8 hours  heparin  Infusion Syringe 300 Unit(s)/Hr (0.6 mL/Hr) CRRT <Continuous>  insulin glargine Injectable (LANTUS) 21 Unit(s) SubCutaneous at bedtime  insulin lispro (ADMELOG) corrective regimen sliding scale   SubCutaneous Before meals and at bedtime  insulin lispro Injectable (ADMELOG) 4 Unit(s) SubCutaneous three times a day before meals  levothyroxine 75 MICROGram(s) Oral daily  multivitamin 1 Tablet(s) Oral daily  nystatin    Suspension [id number] Unit(s) Oral four times a day  petrolatum white Ointment 1 Application(s) Topical three times a day  potassium phosphate / sodium phosphate Powder (PHOS-NaK) 1 Packet(s) Oral three times a day with meals  potassium phosphate / sodium phosphate Powder (PHOS-NaK) 1 Packet(s) Oral two times a day  PrismaSATE Dialysate BGK 4 / 2.5 5000 milliLiter(s) (1400 mL/Hr) CRRT <Continuous>  PrismaSOL Filtration BGK 0 / 2.5 5000 milliLiter(s) (1000 mL/Hr) CRRT <Continuous>  PrismaSOL Filtration BGK 4 / 2.5 5000 milliLiter(s) (300 mL/Hr) CRRT <Continuous>  senna 2 Tablet(s) Oral at bedtime  vancomycin  IVPB 1250 milliGRAM(s) IV Intermittent every 12 hours  vasopressin Infusion 0.02 Unit(s)/Min (3 mL/Hr) IV Continuous <Continuous>    MEDICATIONS  (PRN):  polyethylene glycol 3350 17 Gram(s) Oral two times a day PRN Constipation      ALLERGIES:  Allergies    contrast media (iodine-based) (Fever; Vomiting; Flushing; Hypotension; Rash; Stomach Upset)  Ativan (Rash; Urticaria)  penicillins (Hives (Mod to Severe); Anaphylaxis (Mod to Severe); Short breath (Mod to Severe); Angioedema (Mod to Severe))    Intolerances        OBJECTIVE:  ICU Vital Signs Last 24 Hrs  T(C): 36.8 (2024 03:00), Max: 36.9 (2024 16:00)  T(F): 98.2 (2024 03:00), Max: 98.4 (2024 16:00)  HR: 75 (2024 06:45) (75 - 100)  BP: --  BP(mean): --  ABP: 109/37 (2024 06:45) (85/36 - 121/52)  ABP(mean): 59 (2024 06:45) (51 - 75)  RR: 21 (2024 06:45) (8 - 27)  SpO2: 92% (2024 06:45) (90% - 100%)    O2 Parameters below as of 2024 06:00  Patient On (Oxygen Delivery Method): room air        Adult Advanced Hemodynamics Last 24 Hrs  CVP(mm Hg): --  CVP(cm H2O): --  CO: --  CI: --  PA: --  PA(mean): --  PCWP: --  SVR: --  SVRI: --  PVR: --  PVRI: --  CAPILLARY BLOOD GLUCOSE      POCT Blood Glucose.: 131 mg/dL (2024 21:47)  POCT Blood Glucose.: 94 mg/dL (2024 16:43)  POCT Blood Glucose.: 96 mg/dL (2024 11:36)    CAPILLARY BLOOD GLUCOSE      POCT Blood Glucose.: 131 mg/dL (2024 21:47)    I&O's Summary    2024 07:01  -  2024 07:00  --------------------------------------------------------  IN: 1146 mL / OUT: 4060 mL / NET: -2914 mL    Daily         PHYSICAL EXAMINATION:  General: WN/WD NAD  HEENT: PERRLA, EOMI, moist mucous membranes  Neurology: A&Ox3, nonfocal, PEREZ x 4  Respiratory: CTA B/L, normal respiratory effort, no wheezes, crackles, rales  CV: RRR, S1S2, no murmurs, rubs or gallops  Abdominal: Soft, NT, ND +BS, Last BM  Extremities: 2-3+ pitting edema bilaterally, Right foot diabetic ulcer      LABS:  ABG - ( 2024 00:47 )  pH, Arterial: 7.35  pH, Blood: x     /  pCO2: 47    /  pO2: 148   / HCO3: 26    / Base Excess: -0.2  /  SaO2: 99.7                            7.9    13.46 )-----------( 70       ( 2024 00:50 )             26.9         135  |  103  |  16  ----------------------------<  136<H>  4.0   |  25  |  1.52<H>    Ca    7.3<L>      2024 00:50  Phos  1.8       Mg     2.3         TPro  5.2<L>  /  Alb  2.6<L>  /  TBili  0.6  /  DBili  x   /  AST  17  /  ALT  9<L>  /  AlkPhos  65      LIVER FUNCTIONS - ( 2024 00:50 )  Alb: 2.6 g/dL / Pro: 5.2 g/dL / ALK PHOS: 65 U/L / ALT: 9 U/L / AST: 17 U/L / GGT: x           PT/INR - ( 2024 00:50 )   PT: 11.7 sec;   INR: 1.07 ratio         PTT - ( 2024 00:50 )  PTT:35.4 sec        Urinalysis Basic - ( 2024 00:50 )    Color: x / Appearance: x / SG: x / pH: x  Gluc: 136 mg/dL / Ketone: x  / Bili: x / Urobili: x   Blood: x / Protein: x / Nitrite: x   Leuk Esterase: x / RBC: x / WBC x   Sq Epi: x / Non Sq Epi: x / Bacteria: x        TELEMETRY:     EK Lead ECG:   Systolic  mmHg    Diastolic BP 37 mmHg    Ventricular Rate 72 BPM    Atrial Rate 72 BPM    P-R Interval 308 ms    QRS Duration 186 ms    Q-T Interval 474 ms    QTC Calculation(Bazett) 519 ms    P Axis -9 degrees    R Axis -69 degrees    T Axis 140 degrees    Diagnosis Line SINUS RHYTHM WITH 1ST DEGREE A-V BLOCK  LEFT AXIS DEVIATION  LEFT BUNDLE BRANCH BLOCK  ABNORMAL ECG  WHEN COMPARED WITH ECG OF 2024    Confirmed by MD SEVERINO JONATHAN ([id number]) on 2024 5:25:27 PM (24 @ 05:43)

## 2024-04-28 NOTE — PROGRESS NOTE ADULT - ASSESSMENT
73 year old with  LELIA  DM  CKD  ADHF  recently discharged after treatment for CHF.  admitted for sob  pt developed a diffuse rash all over body after receiving  CT contrast   suspect that this as all anallergic reaction to contrast  media. rash that is pustular in some locations    4/27: Now s/p TAVR with arrest. Suspect reactive leukocytosis.  Exceedingly rapid to suspect PVE. Pretest probability for infection here is low. Merits continued vancomycin pending further incubation and repeat cultures but given that she is a very difficult phelbotomy with friable skin suspect that this mostly likely is a procurement contaminant.   4/28: No concern of uncontrolled infection on the basis of exam. Initial blood cx still limited to a single bottle of CNS. F/U cx pending. Slight uptick in WBC with some eosinophilia.    Suggestions--  F/U cx data  Dose vanco for CRRT  Monitor levels closely as to not exacerbate renal dysfunction further.  Monitor CBC/Diff  D/W CICU team  Dr. Anne to resume care Monday  Please reach out if ID input is needed over the remainder of the weekend.     Jabier Osman MD  Attending Physician  [facility]  Division of Infectious Diseases  [phone]

## 2024-04-28 NOTE — PROGRESS NOTE ADULT - SUBJECTIVE AND OBJECTIVE BOX
MR#[id number]  PATIENT NAME:JOHNATHAN LOPEZ    DATE OF SERVICE: 04-28-24 @ 06:54  Patient was seen and examined by Cuco Tubbs MD on    04-28-24 @ 06:54 .  Interim events noted.Consultant notes ,Labs,Telemetry reviewed by me         Covering for [facility] -Ozzie Kimble, Howard, Carlos Luong, Herman Alston  Office Number: [phone]       HOSPITAL COURSE: HPI:  73F w HFrEF (EF 30%), mod AS, known LBBB, HTN, IDDM1, CKD, hypothyroidism, chronic lymphedema, suspected OHS/LELIA on 3L NC home O2 p/w 1 episode of syncope. Recent admission at [facility] (3/29-4/5) for ADHF and DUNCAN s/p diuresis, discharged with new home O2 3L NC suspecting OHS/LELIA pending outpatient w/u. Since discharge a week ago, she has been staying at home with   Pt had sob walking to car. Once in car, she was still breathing heavily. Partner noticed pt was not responding to verbal stimuli for 10-15sec and witnessed R arm jerking. Pt gained consciousness quickly without postictal symptoms. Pt went to Cardiologist Dr. Nathan who recommended pt to come to ED. No fever, cp, abd pain, n/v. Leg swelling is improved from prior. Pt on torsemide 40mg which she has been taking. Pt was discharged on 3LNC which she is currently on. Patient reports she did not take Farxiga that she was discharged on as she was concerned about side effects.     In ED, patient was found afebrile, hemodynamically stable, breathing well on 3L NC. Initial labs notable for mild hyponatremia, DUNCAN on CKD, elevated proBNP and elevated pCO2 both improved from prior. (12 Apr 2024 16:31)          INTERIM EVENTS:Patient seen at bedside ,interim events noted.  Pt. went for TAVR on 2/24 which was c/b VT/VF requiring shock and flash pulmonary edema causing AHRF requiring intubation. Extubated 4/26  Currently undergoing HD for hyperkalemia and fluid overload.   Currently on pressor support for possible vasoplegic shock, requiring management in CICU.  - s/p Micra 4/25  -Remains in Sinus rhythm on vasopressin gtt tapering to maintain SBP  - On 3LNC    PMH -reviewed admission note, no change since admission  HEART FAILURE: Acute[x ]Chronic[x ] Systolic[x ] Diastolic[ ] Combined Systolic and Diastolic[ ]  CAD[ ] CABG[ ] PCI[ ]  DEVICES[ ] PPM[x ] ICD[ ] ILR[ ]  ATRIAL FIBRILLATION[x ] Paroxysmal[x ] Permanent[ ] CHADS2-[  ]  DUNCAN[ ] CKD1[ ] CKD2[ ] CKD3[ ] CKD4[ ] ESRD[x ]  COPD[ ] HTN[x ]   DM[ ] Type1[ ] Type 2[ ]   CVA[ ] Paresis[ ]    AMBULATION: Assisted[ ] Cane/walker[x ] Independent[ ]    MEDICATIONS  (STANDING):  ascorbic acid 500 milliGRAM(s) Oral daily  aspirin  chewable 81 milliGRAM(s) Oral daily  atorvastatin 80 milliGRAM(s) Oral at bedtime  calamine/zinc oxide Lotion 1 Application(s) Topical three times a day  chlorhexidine 2% Cloths 1 Application(s) Topical daily  chlorhexidine 4% Liquid 1 Application(s) Topical <User Schedule>  CRRT Treatment    <Continuous>  ferrous    sulfate 325 milliGRAM(s) Oral two times a day  heparin   Injectable 5000 Unit(s) SubCutaneous every 8 hours  heparin  Infusion Syringe 300 Unit(s)/Hr (0.6 mL/Hr) CRRT <Continuous>  insulin glargine Injectable (LANTUS) 21 Unit(s) SubCutaneous at bedtime  insulin lispro (ADMELOG) corrective regimen sliding scale   SubCutaneous Before meals and at bedtime  insulin lispro Injectable (ADMELOG) 4 Unit(s) SubCutaneous three times a day before meals  levothyroxine 75 MICROGram(s) Oral daily  multivitamin 1 Tablet(s) Oral daily  nystatin    Suspension 760145 Unit(s) Oral four times a day  petrolatum white Ointment 1 Application(s) Topical three times a day  potassium phosphate / sodium phosphate Powder (PHOS-NaK) 1 Packet(s) Oral three times a day with meals  PrismaSATE Dialysate BGK 4 / 2.5 5000 milliLiter(s) (1400 mL/Hr) CRRT <Continuous>  PrismaSOL Filtration BGK 0 / 2.5 5000 milliLiter(s) (1000 mL/Hr) CRRT <Continuous>  PrismaSOL Filtration BGK 4 / 2.5 5000 milliLiter(s) (300 mL/Hr) CRRT <Continuous>  senna 2 Tablet(s) Oral at bedtime  vancomycin  IVPB 1250 milliGRAM(s) IV Intermittent every 12 hours  vasopressin Infusion 0.02 Unit(s)/Min (3 mL/Hr) IV Continuous <Continuous>    MEDICATIONS  (PRN):  polyethylene glycol 3350 17 Gram(s) Oral two times a day PRN Constipation            REVIEW OF SYSTEMS:  Constitutional: [ ] fever, [ ]weight loss,  [ x]fatigue [ ]weight gain  Eyes: [ ] visual changes  Respiratory: [ ]shortness of breath;  [ ] cough, [ ]wheezing, [ ]chills, [ ]hemoptysis  Cardiovascular: [ ] chest pain, [ ]palpitations, [ ]dizziness,  [ ]leg swelling[ ]orthopnea[ ]PND  Gastrointestinal: [ ] abdominal pain, [ ]nausea, [ ]vomiting,  [ ]diarrhea [ ]Constipation [ ]Melena  Genitourinary: [ ] dysuria, [ ] hematuria [ ][place]  Neurologic: [ ] headaches [ ] tremors[ ]weakness [ ]Paralysis Right[ ] Left[ ]  Skin: [ ] itching, [ ]burning, [ ] rashes  Endocrine: [ ] heat or cold intolerance  Musculoskeletal: [ ] joint pain or swelling; [ ] muscle, back, or extremity pain  Psychiatric: [ ] depression, [ ]anxiety, [ ]mood swings, or [ ]difficulty sleeping  Hematologic: [ ] easy bruising, [ ] bleeding gums    [ ] All remaining systems negative except as per above.   [ ]Unable to obtain.  [x] No change in ROS since admission      Vital Signs Last 24 Hrs  T(C): 36.8 (28 Apr 2024 03:00), Max: 36.9 (27 Apr 2024 07:30)  T(F): 98.2 (28 Apr 2024 03:00), Max: 98.4 (27 Apr 2024 07:30)  HR: 75 (28 Apr 2024 06:45) (73 - 100)  BP: --109/37  RR: 21 (28 Apr 2024 06:45) (8 - 27)  SpO2: 92% (28 Apr 2024 06:45) (90% - 100%)    Parameters below as of 28 Apr 2024 06:00  Patient On (Oxygen Delivery Method): room air      I&O's Summary    26 Apr 2024 07:01  -  27 Apr 2024 07:00  --------------------------------------------------------  IN: 1870 mL / OUT: 3882 mL / NET: -2012 mL    27 Apr 2024 07:01  -  28 Apr 2024 06:54  --------------------------------------------------------  IN: 1146 mL / OUT: 4060 mL / NET: -2914 mL        PHYSICAL EXAM:  General: No acute distress BMI-32  HEENT: EOMI, PERRL  Neck: Supple, [ ] JVD  Lungs: Equal air entry bilaterally; [ ] rales [ ] wheezing [ ] rhonchi  Heart: Regular rate and rhythm; [x ] murmur   2/6 [ x] systolic [ ] diastolic [ ] radiation[ ] rubs [ ]  gallops  Abdomen: Nontender, bowel sounds present  Extremities: No clubbing, cyanosis, [x] lymphedema [ ]Pulses  equal and intact  Nervous system:  Alert & Oriented X3, no focal deficits  Psychiatric: Normal affect  Skin: No rashes or lesions    LABS:  04-28    135  |  103  |  16  ----------------------------<  136<H>  4.0   |  25  |  1.52<H>    Ca    7.3<L>      28 Apr 2024 00:50  Phos  1.8     04-28  Mg     2.3     04-28    TPro  5.2<L>  /  Alb  2.6<L>  /  TBili  0.6  /  DBili  x   /  AST  17  /  ALT  9<L>  /  AlkPhos  65  04-28    Creatinine Trend: 1.52<--, 1.60<--, 1.62<--, 1.84<--, 2.25<--, 2.23<--                        7.9    13.46 )-----------( 70       ( 28 Apr 2024 00:50 )             26.9     PT/INR - ( 28 Apr 2024 00:50 )   PT: 11.7 sec;   INR: 1.07 ratio         PTT - ( 28 Apr 2024 00:50 )  PTT:35.4 sec        Culture - Blood (collected 25 Apr 2024 10:15)  Source: .Blood Blood  Gram Stain (26 Apr 2024 21:56):    Growth in aerobic bottle: Gram Positive Cocci in Clusters  Preliminary Report (26 Apr 2024 21:56):    Growth in aerobic bottle: Gram Positive Cocci in Cluster    Growth in aerobic bottle: Staphylococcus epidermidis    Isolation of Coagulase negative Staphylococcus from single blood culture sets may represent contamination.           Culture - Blood (collected 25 Apr 2024 10:15)  Source: .Blood Blood  Preliminary Report (26 Apr 2024 18:02):    No growth at 24 hours      Xray Chest 1 View- PORTABLE-Urgent (Xray Chest 1 View- PORTABLE-Urgent .) (04.27.24 @ 13:54) >  FINDINGS:    Shiley catheter tip is in the SVC.  TAVR and Micra device noted.  No focal consolidation.  No pleural effusion or pneumothorax.  There is limited evaluation of the heart size and mediastinum on portable  technique.  No acute abnormality within visible osseous structures.      IMPRESSION:  Shiley catheter tip is in the SVC.       12 Lead ECG (04.27.24 @ 05:43) >  SINUS RHYTHM WITH 1ST DEGREE A-V BLOCK  LEFT AXIS DEVIATION  LEFT BUNDLE BRANCH BLOCK  ABNORMAL ECG

## 2024-04-28 NOTE — PROGRESS NOTE ADULT - ASSESSMENT
73F w HFrEF (EF 30%), mod AS, known LBBB, HTN, IDDM1, CKD, hypothyroidism, chronic lymphedema, p/w 1 episode of syncope with R arm jerking.     #Syncope-Aortic Stenosis  - Known Aortic Stenosis likely Severe in setting of decreased LVEF  - s/p tavr on 4/24 c/b VT -> shock -> VFib -> shock  - hypoxic/congested, and was intubated, now extubated on 4/25 in the evening, on 3L NC   - overnight on 4/25 with worsening bradycardia, and now s/p TVP placement followed by AV Micra placement with removal of TVP  -Remains in Sinus Rhythm  - cont pressors as needed, currently on Vaso  - cvvhd as per Renal    #Chronic Systolic HF (EF 30%), mod to severe AS, HTN, LBBB, Lymphedema   - Has known systolic HF and AS.    - Repeat TTE showed EF 30%, mild-mod LVH, mildly reduced RVF, mod-severe AS (.61 cmsq), mod pHTN  - On home Torsemide 20 mg daily, Eplerenone 25 mg daily, and Toprol  mg daily  - Compliant with all meds including Torsemide, though, dose was reduced to Furosemide 40mg once daily. All on hold in the setting of shock  - proBNP 51K from 80k.   - GDMT on hold in the setting of shock  - Was on bumex gtt but then overnight 4/15 developed a fever to 102F and became hypotensive overnight.  - then started on HD    #LBBB  Known LBBB  Noted intermittent Wenckebach on telemetry with bradycardia and 2:1 AV conduction  - EP has been consulted.   -s/p tvp placement followed by AV Micra placement and removal of TVP  -Sinus Rhythm    On Vanco for leukocytosis-improving    - very high risk of decompensation

## 2024-04-29 NOTE — PROGRESS NOTE ADULT - SUBJECTIVE AND OBJECTIVE BOX
[facility] - Howard Kimble, Cherise, Carlos, Alecia, Herman Hernández  Office Number:  [phone]    Patient resting comfortably in bed in NAD.  Laying flat with no respiratory distress.  No complaints of chest pain, dyspnea, palpitations, PND, or orthopnea.  Breathing reporedly better.  Still on vaso.  has not been out of bed.  Getting CVVHD    F/U for:  AS        MEDICATIONS  (STANDING):  ascorbic acid 500 milliGRAM(s) Oral daily  aspirin  chewable 81 milliGRAM(s) Oral daily  atorvastatin 80 milliGRAM(s) Oral at bedtime  calamine/zinc oxide Lotion 1 Application(s) Topical three times a day  chlorhexidine 2% Cloths 1 Application(s) Topical daily  chlorhexidine 4% Liquid 1 Application(s) Topical <User Schedule>  CRRT Treatment    <Continuous>  epoetin mendoza-epbx (RETACRIT) Injectable 93824 Unit(s) IV Push <User Schedule>  ferrous    sulfate 325 milliGRAM(s) Oral two times a day  heparin   Injectable 5000 Unit(s) SubCutaneous every 8 hours  heparin  Infusion Syringe 300 Unit(s)/Hr (0.6 mL/Hr) CRRT <Continuous>  insulin glargine Injectable (LANTUS) 24 Unit(s) SubCutaneous at bedtime  insulin lispro (ADMELOG) corrective regimen sliding scale   SubCutaneous Before meals and at bedtime  insulin lispro Injectable (ADMELOG) 4 Unit(s) SubCutaneous three times a day before meals  levothyroxine 75 MICROGram(s) Oral daily  multivitamin 1 Tablet(s) Oral daily  nystatin    Suspension 178555 Unit(s) Oral four times a day  petrolatum white Ointment 1 Application(s) Topical three times a day  PrismaSATE Dialysate BGK 4 / 2.5 5000 milliLiter(s) (1400 mL/Hr) CRRT <Continuous>  PrismaSOL Filtration BGK 4 / 2.5 5000 milliLiter(s) (1000 mL/Hr) CRRT <Continuous>  PrismaSOL Filtration BGK 4 / 2.5 5000 milliLiter(s) (300 mL/Hr) CRRT <Continuous>  senna 2 Tablet(s) Oral at bedtime  vancomycin  IVPB 1500 milliGRAM(s) IV Intermittent every 24 hours  vasopressin Infusion 0.02 Unit(s)/Min (3 mL/Hr) IV Continuous <Continuous>    MEDICATIONS  (PRN):  polyethylene glycol 3350 17 Gram(s) Oral two times a day PRN Constipation      Allergies    contrast media (iodine-based) (Fever; Vomiting; Flushing; Hypotension; Rash; Stomach Upset)  Ativan (Rash; Urticaria)  penicillins (Hives (Mod to Severe); Anaphylaxis (Mod to Severe); Short breath (Mod to Severe); Angioedema (Mod to Severe))    Intolerances        Vital Signs Last 24 Hrs  T(C): 36.9 (29 Apr 2024 03:00), Max: 37.2 (28 Apr 2024 11:00)  T(F): 98.5 (29 Apr 2024 03:00), Max: 99 (28 Apr 2024 19:00)  HR: 86 (29 Apr 2024 07:45) (75 - 97)  BP: 79/46 (28 Apr 2024 19:45) (79/46 - 79/46)  BP(mean): 58 (28 Apr 2024 19:45) (58 - 58)  RR: 23 (29 Apr 2024 07:45) (14 - 28)  SpO2: 98% (29 Apr 2024 07:45) (93% - 100%)    Parameters below as of 29 Apr 2024 07:00  Patient On (Oxygen Delivery Method): room air        I&O's Summary    28 Apr 2024 07:01  -  29 Apr 2024 07:00  --------------------------------------------------------  IN: 1898.5 mL / OUT: 7181 mL / NET: -5282.5 mL        ON EXAM:    General: NAD, awake and alert, oriented x 3  HEENT: Mucous membranes are moist, anicteric  Lungs: Non-labored, breath sounds are clear bilaterally, No wheezing, rales or rhonchi.  Decreased bs bl  Cardiovascular: Regular, S1 and S2, 2/6 systolic murmur  Gastrointestinal: Bowel Sounds present, soft, nontender.  Obese  Lymph: b/l chronic  peripheral lymphedema. No lymphadenopathy.  Psych:  Mood & affect appropriate    LABS: All Labs Reviewed:                        7.8    13.41 )-----------( 69       ( 29 Apr 2024 01:04 )             25.5                         7.9    13.46 )-----------( 70       ( 28 Apr 2024 00:50 )             26.9                         7.8    9.96  )-----------( 74       ( 27 Apr 2024 00:34 )             25.9     29 Apr 2024 06:26    132    |  101    |  12     ----------------------------<  199    4.0     |  25     |  1.29   29 Apr 2024 01:04    133    |  101    |  12     ----------------------------<  188    4.1     |  25     |  1.40   28 Apr 2024 16:31    133    |  101    |  14     ----------------------------<  171    4.2     |  23     |  1.33     Ca    7.5        29 Apr 2024 06:26  Ca    7.6        29 Apr 2024 01:04  Ca    7.7        28 Apr 2024 16:31  Phos  1.7       29 Apr 2024 06:26  Phos  1.6       29 Apr 2024 01:04  Phos  1.7       28 Apr 2024 16:31  Mg     2.2       29 Apr 2024 06:26  Mg     2.4       29 Apr 2024 01:04  Mg     2.4       28 Apr 2024 16:31    TPro  5.7    /  Alb  2.7    /  TBili  0.6    /  DBili  x      /  AST  12     /  ALT  8      /  AlkPhos  65     29 Apr 2024 06:26  TPro  5.8    /  Alb  2.9    /  TBili  0.6    /  DBili  x      /  AST  16     /  ALT  9      /  AlkPhos  71     29 Apr 2024 01:04  TPro  5.8    /  Alb  2.8    /  TBili  0.6    /  DBili  x      /  AST  18     /  ALT  11     /  AlkPhos  77     28 Apr 2024 16:31    PT/INR - ( 29 Apr 2024 01:04 )   PT: 12.4 sec;   INR: 1.19 ratio         PTT - ( 29 Apr 2024 01:04 )  PTT:31.6 sec      Blood Culture: Organism --  Gram Stain Blood -- Gram Stain --  Specimen Source .Blood Blood  Culture-Blood --    Organism --  Gram Stain Blood -- Gram Stain --  Specimen Source .Blood Blood  Culture-Blood --    Organism Blood Culture PCR  Gram Stain Blood -- Gram Stain   Growth in aerobic bottle: Gram Positive Cocci in Clusters  Specimen Source .Blood Blood  Culture-Blood --

## 2024-04-29 NOTE — PROGRESS NOTE ADULT - SUBJECTIVE AND OBJECTIVE BOX
infectious diseases progress note:    Patient is a 73y old  Female who presents with a chief complaint of Syncope (29 Apr 2024 08:09)        Syncope and collapse          ROS:  CONSTITUTIONAL:  Negative fever or chills, feels well, good appetite  EYES:  Negative  blurry    Allergies    contrast media (iodine-based) (Fever; Vomiting; Flushing; Hypotension; Rash; Stomach Upset)  Ativan (Rash; Urticaria)  penicillins (Hives (Mod to Severe); Anaphylaxis (Mod to Severe); Short breath (Mod to Severe); Angioedema (Mod to Severe))    Intolerances        ANTIBIOTICS/RELEVANT:  antimicrobials  nystatin    Suspension 237584 Unit(s) Oral four times a day    immunologic:  epoetin mendoza-epbx (RETACRIT) Injectable 99804 Unit(s) IV Push <User Schedule>    OTHER:  ascorbic acid 500 milliGRAM(s) Oral daily  aspirin  chewable 81 milliGRAM(s) Oral daily  atorvastatin 80 milliGRAM(s) Oral at bedtime  calamine/zinc oxide Lotion 1 Application(s) Topical three times a day  chlorhexidine 2% Cloths 1 Application(s) Topical daily  chlorhexidine 4% Liquid 1 Application(s) Topical <User Schedule>  CRRT Treatment    <Continuous>  ferrous    sulfate 325 milliGRAM(s) Oral two times a day  heparin   Injectable 5000 Unit(s) SubCutaneous every 8 hours  heparin  Infusion Syringe 300 Unit(s)/Hr CRRT <Continuous>  insulin glargine Injectable (LANTUS) 24 Unit(s) SubCutaneous at bedtime  insulin lispro (ADMELOG) corrective regimen sliding scale   SubCutaneous Before meals and at bedtime  insulin lispro Injectable (ADMELOG) 4 Unit(s) SubCutaneous three times a day before meals  levothyroxine 75 MICROGram(s) Oral daily  multivitamin 1 Tablet(s) Oral daily  petrolatum white Ointment 1 Application(s) Topical three times a day  polyethylene glycol 3350 17 Gram(s) Oral two times a day PRN  PrismaSATE Dialysate BGK 4 / 2.5 5000 milliLiter(s) CRRT <Continuous>  PrismaSOL Filtration BGK 4 / 2.5 5000 milliLiter(s) CRRT <Continuous>  PrismaSOL Filtration BGK 4 / 2.5 5000 milliLiter(s) CRRT <Continuous>  senna 2 Tablet(s) Oral at bedtime  vasopressin Infusion 0.02 Unit(s)/Min IV Continuous <Continuous>      Objective:  Vital Signs Last 24 Hrs  T(C): 36.8 (29 Apr 2024 08:00), Max: 37.2 (28 Apr 2024 19:00)  T(F): 98.2 (29 Apr 2024 08:00), Max: 99 (28 Apr 2024 19:00)  HR: 97 (29 Apr 2024 11:00) (75 - 97)  BP: 79/46 (28 Apr 2024 19:45) (79/46 - 79/46)  BP(mean): 58 (28 Apr 2024 19:45) (58 - 58)  RR: 18 (29 Apr 2024 11:00) (14 - 28)  SpO2: 96% (29 Apr 2024 11:00) (93% - 100%)    Parameters below as of 29 Apr 2024 11:00  Patient On (Oxygen Delivery Method): room air         Eyes:YOKO, EOMI  Ear/Nose/Throat: no oral lesion, no sinus tenderness on percussion	  Neck:no JVD, no lymphadenopathy, supple  Respiratory: CTA rome  Cardiovascular: S1S2 RRR, no murmurs  Gastrointestinal:soft, (+) BS, no HSM  Extremities:no e/e/c        LABS:                        7.8    13.41 )-----------( 69       ( 29 Apr 2024 01:04 )             25.5     04-29    132<L>  |  101  |  12  ----------------------------<  199<H>  4.0   |  25  |  1.29    Ca    7.5<L>      29 Apr 2024 06:26  Phos  1.7     04-29  Mg     2.2     04-29    TPro  5.7<L>  /  Alb  2.7<L>  /  TBili  0.6  /  DBili  x   /  AST  12  /  ALT  8<L>  /  AlkPhos  65  04-29    PT/INR - ( 29 Apr 2024 01:04 )   PT: 12.4 sec;   INR: 1.19 ratio         PTT - ( 29 Apr 2024 01:04 )  PTT:31.6 sec  Urinalysis Basic - ( 29 Apr 2024 06:26 )    Color: x / Appearance: x / SG: x / pH: x  Gluc: 199 mg/dL / Ketone: x  / Bili: x / Urobili: x   Blood: x / Protein: x / Nitrite: x   Leuk Esterase: x / RBC: x / WBC x   Sq Epi: x / Non Sq Epi: x / Bacteria: x          MICROBIOLOGY:    RECENT CULTURES:  04-27 @ 12:20 .Blood Blood                No growth at 24 hours    04-27 @ 12:19 .Blood Blood                No growth at 24 hours    04-25 @ 10:15 .Blood Blood   PCR    Growth in aerobic bottle: Gram Positive Cocci in Clusters    Blood Culture PCR  Blood Culture PCR     No growth at 72 Hours          RESPIRATORY CULTURES:              RADIOLOGY & ADDITIONAL STUDIES:        Pager [phone]  After 5 pm/weekends or if no response :[phone]

## 2024-04-29 NOTE — PROGRESS NOTE ADULT - SUBJECTIVE AND OBJECTIVE BOX
EVELYN LOPEZ  MRN-[id number]  Patient is a 73y old  Female who presents with a chief complaint of Syncope (29 Apr 2024 20:36)    HPI:  73F w HFrEF (EF 30%), mod AS, known LBBB, HTN, IDDM1, CKD, hypothyroidism, chronic lymphedema, suspected OHS/LELIA on 3L NC home O2 p/w 1 episode of syncope. Recent admission at [facility] (3/29-4/5) for ADHF and DUNCAN s/p diuresis, discharged with new home O2 3L NC suspecting OHS/LELIA pending outpatient w/u. Since discharge a week ago, she has been staying at home with   Pt had sob walking to car. Once in car, she was still breathing heavily. Partner noticed pt was not responding to verbal stimuli for 10-15sec and witnessed R arm jerking. Pt gained consciousness quickly without postictal symptoms. Pt went to Cardiologist Dr. Nathan who recommended pt to come to ED. No fever, cp, abd pain, n/v. Leg swelling is improved from prior. Pt on torsemide 40mg which she has been taking. Pt was discharged on 3LNC which she is currently on. Patient reports she did not take Farxiga that she was discharged on as she was concerned about side effects.     In ED, patient was found afebrile, hemodynamically stable, breathing well on 3L NC. Initial labs notable for mild hyponatremia, DUNCAN on CKD, elevated proBNP and elevated pCO2 both improved from prior. (12 Apr 2024 16:31)      Hospital Course:    24 HOUR EVENTS:    REVIEW OF SYSTEMS:    CONSTITUTIONAL: No weakness, fevers or chills  EYES/ENT: No visual changes;  No vertigo or throat pain   NECK: No pain or stiffness  RESPIRATORY: No cough, wheezing, hemoptysis; No shortness of breath  CARDIOVASCULAR: No chest pain or palpitations  GASTROINTESTINAL: No abdominal or epigastric pain. No nausea, vomiting, or hematemesis; No diarrhea or constipation. No melena or hematochezia.  GENITOURINARY: No dysuria, frequency or hematuria  NEUROLOGICAL: No numbness or weakness  SKIN: No itching, rashes      ICU Vital Signs Last 24 Hrs  T(C): 37.1 (29 Apr 2024 19:00), Max: 37.2 (28 Apr 2024 23:00)  T(F): 98.7 (29 Apr 2024 19:00), Max: 99 (28 Apr 2024 23:00)  HR: 100 (29 Apr 2024 22:15) (75 - 107)  BP: --  BP(mean): --  ABP: 123/52 (29 Apr 2024 22:15) (84/29 - 131/47)  ABP(mean): 77 (29 Apr 2024 22:15) (45 - 79)  RR: 21 (29 Apr 2024 22:15) (16 - 29)  SpO2: 100% (29 Apr 2024 22:15) (93% - 100%)    O2 Parameters below as of 29 Apr 2024 20:00  Patient On (Oxygen Delivery Method): room air            CVP(mm Hg): --  CO: --  CI: --  PA: --  PA(mean): --  PA(direct): --  PCWP: --  LA: --  RA: --  SVR: --  SVRI: --  PVR: --  PVRI: --    POCT Blood Glucose.: 196 mg/dL (04-29-24 @ 22:20)  POCT Blood Glucose.: 171 mg/dL (04-29-24 @ 16:37)  POCT Blood Glucose.: 143 mg/dL (04-29-24 @ 11:34)  POCT Blood Glucose.: 205 mg/dL (04-29-24 @ 07:44)    CAPILLARY BLOOD GLUCOSE      POCT Blood Glucose.: 196 mg/dL (29 Apr 2024 22:20)      PHYSICAL EXAM:  GENERAL: No acute distress, well-developed  HEAD:  Atraumatic, Normocephalic  EYES: EOMI, PERRLA, conjunctiva and sclera clear  NECK: Supple, no lymphadenopathy, no JVD  CHEST/LUNG: CTAB; No wheezes, rales, or rhonchi  HEART: Regular rate and rhythm. Normal S1/S2. No murmurs, rubs, or gallops  ABDOMEN: Soft, non-tender, non-distended; normal bowel sounds, no organomegaly  EXTREMITIES:  2+ peripheral pulses b/l, No clubbing, cyanosis, or edema  NEUROLOGY: A&O x 3, no focal deficits  SKIN: No rashes or lesions    ============================I/O===========================   I&O's Detail    28 Apr 2024 07:01  -  29 Apr 2024 07:00  --------------------------------------------------------  IN:    IV PiggyBack: 550 mL    Oral Fluid: 1170 mL    Vasopressin: 178.5 mL  Total IN: 1898.5 mL    OUT:    Other (mL): 7181 mL  Total OUT: 7181 mL    Total NET: -5282.5 mL      29 Apr 2024 07:01  -  29 Apr 2024 22:39  --------------------------------------------------------  IN:    Oral Fluid: 1440 mL    Vasopressin: 56.9 mL  Total IN: 1496.9 mL    OUT:    Other (mL): 4649 mL  Total OUT: 4649 mL    Total NET: -3152.1 mL        ============================ LABS =========================                        7.8    13.41 )-----------( 69       ( 29 Apr 2024 01:04 )             25.5     04-29    131<L>  |  99  |  12  ----------------------------<  158<H>  4.1   |  22  |  1.30    Ca    7.9<L>      29 Apr 2024 18:41  Phos  1.2     04-29  Mg     2.3     04-29    TPro  6.2  /  Alb  3.0<L>  /  TBili  0.7  /  DBili  x   /  AST  17  /  ALT  13  /  AlkPhos  81  04-29                LIVER FUNCTIONS - ( 29 Apr 2024 18:41 )  Alb: 3.0 g/dL / Pro: 6.2 g/dL / ALK PHOS: 81 U/L / ALT: 13 U/L / AST: 17 U/L / GGT: x           PT/INR - ( 29 Apr 2024 01:04 )   PT: 12.4 sec;   INR: 1.19 ratio         PTT - ( 29 Apr 2024 01:04 )  PTT:31.6 sec  ABG - ( 29 Apr 2024 00:44 )  pH, Arterial: 7.35  pH, Blood: x     /  pCO2: 48    /  pO2: 142   / HCO3: 26    / Base Excess: 0.7   /  SaO2: 98.1              Blood Gas Arterial, Lactate: 0.9 mmol/L (04-29-24 @ 00:44)    Urinalysis Basic - ( 29 Apr 2024 18:41 )    Color: x / Appearance: x / SG: x / pH: x  Gluc: 158 mg/dL / Ketone: x  / Bili: x / Urobili: x   Blood: x / Protein: x / Nitrite: x   Leuk Esterase: x / RBC: x / WBC x   Sq Epi: x / Non Sq Epi: x / Bacteria: x      ======================Micro/Rad/Cardio=================  Telemtry: Reviewed   EKG: Reviewed  CXR: Reviewed  Culture: Reviewed   Echo:   Cath:   ======================================================  PAST MEDICAL & SURGICAL HISTORY:  Hypertension      Diabetes      Lymphedema      H/O left bundle branch block      History of left bundle branch block (LBBB)      Systolic heart failure, chronic      Type 1 diabetes      H/O cataract  2020      History of surgical removal of meniscus of knee  left in 1971      Frozen shoulder  2000      H/O Achilles tendon repair  lengthened bilaterally, 2000      Fractured skull  1968        ====================ASSESSMENT ==============  72 y/o female w/ PMH: HFrEF (EF 30%), AS, LBBB, HTN, IDDM1, CKD, hypothyroidism, chronic lymphedema, suspected OHS/LELIA (3L NC home O2) p/w 1 episode of syncope most likely 2/2 moderate to severe AS, admitted to floors for TAVR w/u. Course complicated by suspected contrast induced allergic reaction (had CTA for TAVR eval) and contrast related renal failure (acute on chronic) requiring HD. Pt. went for TAVR on 2/24 which was c/b VT/VF requiring shock and flash pulmonary edema causing AHRF requiring intubation. Currently undergoing HD for hyperkalemia and fluid overload. Currently on pressor support for possible vasoplegic shock, requiring management in CICU.    NEURO:  - Extubated 4/26  - Currently AOx4  -no focal deficits     PULM  # Acute hypoxic respiratory failure most likely 2/2 flash pulmonary edema, resolved   # OHS/LELIA (on home oxygen)  - currently on 3L NC saturating well  - monitor status    CV  #Shock state  - initially suspected vasoplegia from sedation, now with persistent pressor requirement is there component of sepsis or hypovolemia from CRRT? less likely obstructive or cardiogenic  - currently on vasopressin, wean as tolerated keeping MAP>65   - lactate cleared  -off antibiotics     #VT/vfib s/p shock  #Known LBBB with first degree AV block  - Monitor on tele  - Trend lytes    #2:1 AV block with 1st degree AV block  #Severe AS s/p TAVR  - s/p Micra 4/25, no longer requiring TVP (eventual candidate for CRT per EP?)  - c/w ASA  - c/w atorvastatin     # HFrEF  - EF 25%  - Holding GDMT while on pressors    /RENAL  # DUNCAN on CKD requiring HD c/b hyperkalemia  - CRRT, removing 250ml/hr, tolerating well   -planned to d/c CRRT in the AM, for iHD tomorrow   - Strict I/Os  - Trend BMP, lytes  - Avoid nephrotoxins    GI  - resumed diet    ENDO  # IDDM1  # DKA  - s/p Insulin drip  - transitioned to basal/bolus    # Hypothyroidism  - c/w home levothyroxine     SKIN  # Acute generalized exanthematous pustulosis due to drug  - c/w calamine  - c/w vaseline    # Psoriasis   # Lymphedema   - Elevate legs  - Compression stockings, ACE wrapping    ID  Prelim BCx drawn 4/25 Gram + cocci in clusters in aerobic bottle  - Monitor WBC and for fevers  -blood cultures ngtd 4/27  -Vancomycin d/c today     # Oral Candidiasis  - c/w nystatin         Patient requires continuous monitoring with bedside rhythm monitoring, pulse ox monitoring, and intermittent blood gas analysis. Care plan discussed with ICU care team. Patient remained critical and at risk for life threatening decompensation.  Patient seen, examined and plan discussed with CCU team during rounds.       I have personally provided __30__ minutes of critical care time excluding time spent on separate procedures, in addition to initial critical care time provided by the CICU Attending, Dr. Sandoval.

## 2024-04-29 NOTE — PROGRESS NOTE ADULT - ASSESSMENT
Assessment:  73F PMH: HFrEF (EF 30%), AS, LBBB, HTN, DM1, CKD, hypothyroidism, chronic lymphedema, LELIA (3L home O2) p/w for syncope 2/2 severe AS, s/p TAVR 4/24. Course complicated by contrast induced allergic reaction (had CTA for TAVR eval) and contrast related renal failure (acute on chronic) requiring HD. TAVR c/b VT and vfib requiring shock and flash pulmonary edema causing AHRF requiring intubation. Currently on HD for fluid overload, on pressor support for distributive septic shock.    NEURO:  - Extubated 4/26  - Currently AOx4    PULM  # AHRF  2/2 flash pulmonary edema  # LELIA (on 3L home oxygen)  - Extubated 4/26  - intermittently requiring nasal cannula    CV  #Shock state  - initially vasoplegia from sedation, now suspect pressor requirement is secondary to losing fluid from CVVHD  - on vasopressin 0.05  - lactate cleared  - c/w vancomycin to address possible underlying infection  - ID following    #VT/vfib s/p shock  #2:1 AV block  #Severe AS s/p TAVR  - Known LBBB with first degree AV block on telemetry  - s/p Micra 4/25, no longer requiring TVP (eventual candidate for CRT per EP)  - c/w ASA, atorvastatin     # HFrEF  - EF 25%  - Holding GDMT while on pressors    /RENAL  #ESRD  - from ATN and contrast nephropathy  - CRRT, increase rate to 250cc/hr given persistent volume overload  - pressor support PRN for volume removal  - Strict I/Os  - Trend BMP, lytes  - Avoid nephrotoxins    GI  - resumed diet    ENDO  # DM1  # DKA  - s/p Insulin drip  - transitioned to basal/bolus  - monitor POCT FS    # Hypothyroidism  - c/w home levothyroxine     SKIN  # Acute generalized exanthematous pustulosis   - c/w calamine,  Vaseline    # Psoriasis   # Lymphedema   # R. Diabetic foot ulcer  - Elevate legs  - Compression stockings, ACE wrapping  - Podiatry following, appreciate recs    ID  - Monitor WBC and for fevers  - pos for staph E. bacteremia (likely contaminant 1 out of 4 bottles)  - c/w Vancomycin for now and dose to >15 vanc trough  - ID following    # Oral Candidiasis  - c/w nystatin     Mobilize out of bed once off CRRT and pressors.     Patient requires continuous monitoring with bedside rhythm monitoring, pulse ox monitoring, and intermittent blood gas analysis. Care plan discussed with ICU care team. Patient remained critical and at risk for life threatening decompensation.  Patient seen, examined and plan discussed with CCU team during rounds.    Assessment:  73F PMH: HFrEF (EF 30%), AS, LBBB, HTN, DM1, CKD, hypothyroidism, chronic lymphedema, LELIA (3L home O2) p/w for syncope 2/2 severe AS, s/p TAVR 4/24. Course complicated by contrast induced allergic reaction (had CTA for TAVR eval) and contrast related renal failure (acute on chronic) requiring HD. TAVR c/b VT and vfib requiring shock and flash pulmonary edema causing AHRF requiring intubation. Currently on HD for fluid overload, on pressor support for distributive septic shock.    NEURO:  - Extubated 4/26  - Currently AOx4  - OOBTC    PULM  # AHRF  2/2 flash pulmonary edema  # LELIA (on 3L home oxygen)  - Extubated 4/26  - 4/27 CXR - no consolidation or pleural effusion  - intermittently requiring nasal cannula    CV  #Shock state  -  vasoplegia from sedation vs losing fluid from CVVHD vs distributive septic shock  - on vasopressin 0.05  - lactate cleared  - dc vancomycin , completed 5 days  - ID following    #VT/vfib s/p shock  #2:1 AV block  #Severe AS s/p TAVR  - Known LBBB with first degree AV block on telemetry  - s/p Micra 4/25, no longer requiring TVP (eventual candidate for CRT per EP)  - c/w ASA, atorvastatin     # HFrEF  - EF 25%  - Holding GDMT while on pressors    /RENAL  #ESRD  - from ATN and contrast nephropathy  - CRRT, increase rate to 275 cc/hr given persistent volume overload for one more day and switch to intermittent HD  - pressor support PRN for volume removal   - Strict I/Os  - Trend BMP, lytes  - Avoid nephrotoxins    GI  - resumed diet    ENDO  # DM1  # DKA    - on  basal/bolus lantus 24 QHS, admelog dec to 5u TIDAC  - monitor POCT FS, goal 140-180    # Hypothyroidism  - c/w home levothyroxine     #Cushing  # L adrenal nodule  -dexamethasone suppression test w/ am cortisol - not suppressed  -repeat 24hr urine cortisol once not on CRRT  -f/u MN salivary cortisol, dexamethasone level    SKIN  # Acute generalized exanthematous pustulosis   - c/w calamine,  Vaseline    # Psoriasis   # Lymphedema   # R. Diabetic foot ulcer  - Elevate legs  - Compression stockings, ACE wrapping  - Podiatry following- foot wounds management, decubitis precuations, f/u outpt, check for iodine gel allergy    ID  - Monitor WBC and for fevers  - pos for staph E. bacteremia (likely contaminant 1 out of 4 bottles)  - dc Vancomycin , 5 days completed  - ID following    # Oral Candidiasis  - c/w nystatin     Heme  #Anemia  -AARTI vs AOCD vs anemia 2/2 blood loss vs myelosuppression iso critical illness  -trend H&H, transfuse PRN    #thrombocytopenia  -  myelosuppression iso critical illness  - no active signs of bleeding, monitor plts, transfuse PRN      Mobilize out of bed once off CRRT and pressors.     Patient requires continuous monitoring with bedside rhythm monitoring, pulse ox monitoring, and intermittent blood gas analysis. Care plan discussed with ICU care team. Patient remained critical and at risk for life threatening decompensation.  Patient seen, examined and plan discussed with CCU team during rounds.

## 2024-04-29 NOTE — PROGRESS NOTE ADULT - ASSESSMENT
73 year old with  LELIA  DM  CKD  ADHF  recently discharged after treatment for CHF.  admitted for sob  pt developed a diffuse rash all over body after receiving  CT contrast   suspect that this as all anallergic reaction to contrast  media. rash that is pustular in some locations    4/27: Now s/p TAVR with arrest. Suspect reactive leukocytosis.  Exceedingly rapid to suspect PVE. Pretest probability for infection here is low. Merits continued vancomycin pending further incubation and repeat cultures but given that she is a very difficult phelbotomy with friable skin suspect that this mostly likely is a procurement contaminant.   4/28: No concern of uncontrolled infection on the basis of exam. Initial blood cx still limited to a single bottle of CNS. F/U cx pending. Slight uptick in WBC with some eosinophilia.        repeat bc negative  1/8 positive DOUBT SIGNIFICANCE      can dc vanco

## 2024-04-29 NOTE — PROGRESS NOTE ADULT - SUBJECTIVE AND OBJECTIVE BOX
PROGRESS NOTE:   Authored by Dr. Jane Sandy  Patient is a 73y old  Female who presents with a chief complaint of Syncope (29 Apr 2024 08:09)        OVERNIGHT EVENTS: No acute overnight events. No UOP, inc fluid removal w/CVVHD from 250 to 275/hr, pt is -4L , 1 BM yesterday. Dec pressor requirement from vasopressin 0.09 to 0.055    SUBJECTIVE: Patient was seen and examined at bedside this morning.   Denies any nausea/vomiting/diarrhea, headache, shortness of breath, abdominal pain or chest pain/palpitations.   Patient responding appropriately to questions and able to make needs known.   Vital signs/imaging/labs/telemetry events reviewed.   No acute complaints or concerns. Pt is feeling well. Tolerating PO.  Stating she feels fine.     All other ROS neg except as above       MEDICATIONS  (STANDING):  ascorbic acid 500 milliGRAM(s) Oral daily  aspirin  chewable 81 milliGRAM(s) Oral daily  atorvastatin 80 milliGRAM(s) Oral at bedtime  calamine/zinc oxide Lotion 1 Application(s) Topical three times a day  chlorhexidine 2% Cloths 1 Application(s) Topical daily  chlorhexidine 4% Liquid 1 Application(s) Topical <User Schedule>  CRRT Treatment    <Continuous>  epoetin mendoza-epbx (RETACRIT) Injectable 22457 Unit(s) IV Push <User Schedule>  ferrous    sulfate 325 milliGRAM(s) Oral two times a day  heparin   Injectable 5000 Unit(s) SubCutaneous every 8 hours  heparin  Infusion Syringe 300 Unit(s)/Hr (0.6 mL/Hr) CRRT <Continuous>  insulin glargine Injectable (LANTUS) 24 Unit(s) SubCutaneous at bedtime  insulin lispro (ADMELOG) corrective regimen sliding scale   SubCutaneous Before meals and at bedtime  insulin lispro Injectable (ADMELOG) 4 Unit(s) SubCutaneous three times a day before meals  levothyroxine 75 MICROGram(s) Oral daily  multivitamin 1 Tablet(s) Oral daily  nystatin    Suspension 723245 Unit(s) Oral four times a day  petrolatum white Ointment 1 Application(s) Topical three times a day  PrismaSATE Dialysate BGK 4 / 2.5 5000 milliLiter(s) (1400 mL/Hr) CRRT <Continuous>  PrismaSOL Filtration BGK 4 / 2.5 5000 milliLiter(s) (1000 mL/Hr) CRRT <Continuous>  PrismaSOL Filtration BGK 4 / 2.5 5000 milliLiter(s) (300 mL/Hr) CRRT <Continuous>  senna 2 Tablet(s) Oral at bedtime  vasopressin Infusion 0.02 Unit(s)/Min (3 mL/Hr) IV Continuous <Continuous>    MEDICATIONS  (PRN):  polyethylene glycol 3350 17 Gram(s) Oral two times a day PRN Constipation      CAPILLARY BLOOD GLUCOSE      POCT Blood Glucose.: 205 mg/dL (29 Apr 2024 07:44)  POCT Blood Glucose.: 172 mg/dL (28 Apr 2024 21:12)  POCT Blood Glucose.: 171 mg/dL (28 Apr 2024 16:25)  POCT Blood Glucose.: 212 mg/dL (28 Apr 2024 12:05)    I&O's Summary    28 Apr 2024 07:01  -  29 Apr 2024 07:00  --------------------------------------------------------  IN: 1898.5 mL / OUT: 7181 mL / NET: -5282.5 mL    29 Apr 2024 07:01  -  29 Apr 2024 11:24  --------------------------------------------------------  IN: 504.9 mL / OUT: 1691 mL / NET: -1186.1 mL        PHYSICAL EXAM:  Vital Signs Last 24 Hrs  T(C): 36.8 (29 Apr 2024 08:00), Max: 37.2 (28 Apr 2024 19:00)  T(F): 98.2 (29 Apr 2024 08:00), Max: 99 (28 Apr 2024 19:00)  HR: 97 (29 Apr 2024 11:00) (75 - 97)  BP: 79/46 (28 Apr 2024 19:45) (79/46 - 79/46)  BP(mean): 58 (28 Apr 2024 19:45) (58 - 58)  RR: 18 (29 Apr 2024 11:00) (14 - 28)  SpO2: 96% (29 Apr 2024 11:00) (93% - 100%)    Parameters below as of 29 Apr 2024 11:00  Patient On (Oxygen Delivery Method): room air        PHYSICAL EXAM:     GENERAL: NAD  HEAD:  Atraumatic, Normocephalic  EYES: EOMI, conjunctiva and sclera clear, no nystagmus noted  ENT: Moist mucous membranes  CHEST/LUNG: normal work of breathing, Clear to auscultation bilaterally; No rales, rhonchi, wheezing, or rubs. Unlabored respirations  HEART:  Regular rate and rhythm; No murmurs, rubs, or gallops, normal S1/S2  ABDOMEN: normal bowel sounds; Soft, nontender, nondistended, no organomegaly   EXTREMITIES:  extensive chronic edema in b/l LE, dec Peripheral Pulses, No clubbing, cyanosis  MSK: No gross deformities noted, ROM wnl   Neurological:  A&Ox3, no focal deficits   SKIN: dry peeling flakey pale,  scattered psoriatic plaques throughout extremities and torso, no blistering  or  drainage  BL LE soft plaques easily lifted w/ mechanical debridement , w/ open skin & blistering and scant drainage  Rt plantar foot w/ dark blister around callous  No odor, erythema, increased warmth, tenderness, induration, fluctuance, nor crepitus  PSYCH: Normal mood, affect         LABS:                        7.8    13.41 )-----------( 69       ( 29 Apr 2024 01:04 )             25.5     04-29    132<L>  |  101  |  12  ----------------------------<  199<H>  4.0   |  25  |  1.29    Ca    7.5<L>      29 Apr 2024 06:26  Phos  1.7     04-29  Mg     2.2     04-29    TPro  5.7<L>  /  Alb  2.7<L>  /  TBili  0.6  /  DBili  x   /  AST  12  /  ALT  8<L>  /  AlkPhos  65  04-29    PT/INR - ( 29 Apr 2024 01:04 )   PT: 12.4 sec;   INR: 1.19 ratio         PTT - ( 29 Apr 2024 01:04 )  PTT:31.6 sec        Culture - Blood (collected 27 Apr 2024 12:20)  Source: .Blood Blood  Preliminary Report (28 Apr 2024 19:02):    No growth at 24 hours    Culture - Blood (collected 27 Apr 2024 12:19)  Source: .Blood Blood  Preliminary Report (28 Apr 2024 19:02):    No growth at 24 hours        Tele Reviewed:    RADIOLOGY & ADDITIONAL TESTS:  Results Reviewed: yes  Imaging Personally Reviewed: yes  Electrocardiogram Personally Reviewed: yes

## 2024-04-29 NOTE — PROGRESS NOTE ADULT - ATTENDING COMMENTS
Initially admitted with syncope in the setting of severe AS s/p TAVR   Patient has had a long hospital course that has been complicated by VT/VF cardiac arrest, DUNCAN requiring CVVH, heart block requiring PPM and contrast-induced skin sloughing  A+Ox3  Shock requiring Vasopressin in the setting of CVVH - wean as able  TTE with severely reduced LVEF  O2 sats mid 90s on room air  DASH/diabetic diet  DUNCAN requiring CVVH at 250 cc hour - will maintain for today and stop tomorrow   H/H low but acceptable on HSQ for DVT prophylaxis   Afebrile, on Vancomycin for +MRSE in blood cultures, likely contaminant - will stop   Sugars controlled on Lantus  Endocrine if following for adrenal nodule and concern for Cushings - f/u recs  Marily 4/24 and L isela Hidalgo 4/27

## 2024-04-29 NOTE — PROGRESS NOTE ADULT - ASSESSMENT
73F w HFrEF (EF 30%), mod AS, known LBBB, HTN, IDDM1, CKD, hypothyroidism, chronic lymphedema, p/w 1 episode of syncope with R arm jerking.     #Syncope-Aortic Stenosis  - Known Aortic Stenosis likely Severe in setting of decreased LVEF  - s/p tavr on 4/24 c/b VT -> shock -> VFib -> shock  - hypoxic/congested, and was intubated, now extubated on 4/25 in the evening, on NC   - overnight on 4/25 with worsening bradycardia, and now s/p TVP placement followed by AV Micra placement with removal of TVP  -Remains in Sinus Rhythm  - cont pressors as needed, currently on Vaso  - cvvhd as per Renal    #Chronic Systolic HF (EF 30%), mod to severe AS, HTN, LBBB, Lymphedema   - Has known systolic HF and AS.    - Repeat TTE showed EF 30%, mild-mod LVH, mildly reduced RVF, mod-severe AS (.61 cmsq), mod pHTN  - On home Torsemide 20 mg daily, Eplerenone 25 mg daily, and Toprol  mg daily  - Compliant with all meds including Torsemide, though, dose was reduced to Furosemide 40mg once daily. All on hold in the setting of shock  - proBNP 51K from 80k.   - GDMT on hold in the setting of shock  - Was on bumex gtt but then overnight 4/15 developed a fever to 102F and became hypotensive overnight.  - then started on HD    #LBBB  Known LBBB  Noted intermittent Wenckebach on telemetry with bradycardia and 2:1 AV conduction  - EP has been consulted.   -s/p tvp placement followed by AV Micra placement and removal of TVP  -Sinus Rhythm    On Vanco for leukocytosis-improving    - very high risk of decompensation

## 2024-04-29 NOTE — PROGRESS NOTE ADULT - SUBJECTIVE AND OBJECTIVE BOX
[facility]   [phone]  RENAL FOLLOW UP NOTE  --------------------------------------------------------------------------------  Chief Complaint:    24 hour events/subjective:    seen on crrt   states breathing is better     PAST HISTORY  --------------------------------------------------------------------------------  No significant changes to PMH, PSH, FHx, SHx, unless otherwise noted    ALLERGIES & MEDICATIONS  --------------------------------------------------------------------------------  Allergies    contrast media (iodine-based) (Fever; Vomiting; Flushing; Hypotension; Rash; Stomach Upset)  Ativan (Rash; Urticaria)  penicillins (Hives (Mod to Severe); Anaphylaxis (Mod to Severe); Short breath (Mod to Severe); Angioedema (Mod to Severe))    Intolerances      Standing Inpatient Medications  ascorbic acid 500 milliGRAM(s) Oral daily  aspirin  chewable 81 milliGRAM(s) Oral daily  atorvastatin 80 milliGRAM(s) Oral at bedtime  calamine/zinc oxide Lotion 1 Application(s) Topical three times a day  chlorhexidine 2% Cloths 1 Application(s) Topical daily  chlorhexidine 4% Liquid 1 Application(s) Topical <User Schedule>  CRRT Treatment    <Continuous>  epoetin mendoza-epbx (RETACRIT) Injectable 62161 Unit(s) IV Push <User Schedule>  ferrous    sulfate 325 milliGRAM(s) Oral two times a day  heparin   Injectable 5000 Unit(s) SubCutaneous every 8 hours  heparin  Infusion Syringe 300 Unit(s)/Hr CRRT <Continuous>  insulin glargine Injectable (LANTUS) 24 Unit(s) SubCutaneous at bedtime  insulin lispro (ADMELOG) corrective regimen sliding scale   SubCutaneous Before meals and at bedtime  insulin lispro Injectable (ADMELOG) 4 Unit(s) SubCutaneous three times a day before meals  levothyroxine 75 MICROGram(s) Oral daily  multivitamin 1 Tablet(s) Oral daily  mupirocin 2% Ointment 1 Application(s) Topical three times a day  nystatin    Suspension 538433 Unit(s) Oral four times a day  petrolatum white Ointment 1 Application(s) Topical three times a day  PrismaSATE Dialysate BGK 4 / 2.5 5000 milliLiter(s) CRRT <Continuous>  PrismaSOL Filtration BGK 4 / 2.5 5000 milliLiter(s) CRRT <Continuous>  PrismaSOL Filtration BGK 4 / 2.5 5000 milliLiter(s) CRRT <Continuous>  senna 2 Tablet(s) Oral at bedtime  vasopressin Infusion 0.02 Unit(s)/Min IV Continuous <Continuous>    PRN Inpatient Medications  polyethylene glycol 3350 17 Gram(s) Oral two times a day PRN      REVIEW OF SYSTEMS  --------------------------------------------------------------------------------    Gen: denies  fevers/chills,  CVS: denies chest pain/palpitations  Resp: denies SOB/Cough  GI: Denies N/V/Abd pain  : Denies dysuria/oliguria    VITALS/PHYSICAL EXAM  --------------------------------------------------------------------------------  T(C): 37.1 (04-29-24 @ 19:00), Max: 37.2 (04-28-24 @ 23:00)  HR: 98 (04-29-24 @ 20:15) (75 - 105)  BP: --  RR: 21 (04-29-24 @ 20:15) (14 - 29)  SpO2: 98% (04-29-24 @ 20:15) (93% - 100%)  Wt(kg): --        04-28-24 @ 07:01  -  04-29-24 @ 07:00  --------------------------------------------------------  IN: 1898.5 mL / OUT: 7181 mL / NET: -5282.5 mL    04-29-24 @ 07:01  -  04-29-24 @ 20:38  --------------------------------------------------------  IN: 1496.9 mL / OUT: 4649 mL / NET: -3152.1 mL      Physical Exam:  	        Gen:  on RA, facial erythema, neck and arm erythema  	Pulm: decrease bs  no rales or ronchi or wheezing  	CV: No JVD. RRR, S1S2; no rub II/VI M   	Abd: +BS, soft, nontender/nondistended, obese   	UE: Warm, no cyanosis  no clubbing,   +  edema  	LE: Warm, no cyanosis  no clubbing, 4+ edema, B/L LE raised red plaque   	Neuro: alert and oriented               Vascular Access: +  non tunneled HD catheter      LABS/STUDIES  --------------------------------------------------------------------------------              7.8    13.41 >-----------<  69       [04-29-24 @ 01:04]              25.5     131  |  99  |  12  ----------------------------<  158      [04-29-24 @ 18:41]  4.1   |  22  |  1.30        Ca     7.9     [04-29-24 @ 18:41]      Mg     2.3     [04-29-24 @ 18:41]      Phos  1.2     [04-29-24 @ 18:41]    TPro  6.2  /  Alb  3.0  /  TBili  0.7  /  DBili  x   /  AST  17  /  ALT  13  /  AlkPhos  81  [04-29-24 @ 18:41]    PT/INR: PT 12.4 , INR 1.19       [04-29-24 @ 01:04]  PTT: 31.6       [04-29-24 @ 01:04]      Creatinine Trend:  SCr 1.30 [04-29 @ 18:41]  SCr 1.30 [04-29 @ 12:48]  SCr 1.29 [04-29 @ 06:26]  SCr 1.40 [04-29 @ 01:04]  SCr 1.33 [04-28 @ 16:31]      PTH -- (Ca 8.4)      [04-19-24 @ 17:54]   109  TSH 5.06      [04-14-24 @ 07:18]  Lipid: chol 74, TG 70, HDL 33, LDL --      [04-13-24 @ 07:05]

## 2024-04-29 NOTE — PROGRESS NOTE ADULT - SUBJECTIVE AND OBJECTIVE BOX
*****Structural Heart Team*****    Subjective:    Ms. Chowdhury is resting in bed, stating she is feeling better. She is still on CVVH.    PAST MEDICAL & SURGICAL HISTORY:  Hypertension    Diabetes    Lymphedema    H/O left bundle branch block    History of left bundle branch block (LBBB)    Systolic heart failure, chronic    Type 1 diabetes    H/O cataract  2020    History of surgical removal of meniscus of knee  left in 1971    Frozen shoulder  2000    H/O Achilles tendon repair  lengthened bilaterally, 2000    Fractured skull  1968          T(C): 36.8 (04-29-24 @ 08:00), Max: 37.2 (04-28-24 @ 19:00)  HR: 97 (04-29-24 @ 11:30) (75 - 97)  BP: 79/46 (04-28-24 @ 19:45) (79/46 - 79/46)  RR: 23 (04-29-24 @ 11:30) (14 - 28)  SpO2: 100% (04-29-24 @ 11:30) (93% - 100%)  Wt(kg): --  04-28 @ 07:01  -  04-29 @ 07:00  --------------------------------------------------------  IN: 1898.5 mL / OUT: 7181 mL / NET: -5282.5 mL    04-29 @ 07:01  -  04-29 @ 11:53  --------------------------------------------------------  IN: 504.9 mL / OUT: 1691 mL / NET: -1186.1 mL      MEDICATIONS  (STANDING):  ascorbic acid 500 milliGRAM(s) Oral daily  aspirin  chewable 81 milliGRAM(s) Oral daily  atorvastatin 80 milliGRAM(s) Oral at bedtime  calamine/zinc oxide Lotion 1 Application(s) Topical three times a day  chlorhexidine 2% Cloths 1 Application(s) Topical daily  chlorhexidine 4% Liquid 1 Application(s) Topical <User Schedule>  CRRT Treatment    <Continuous>  epoetin mendoza-epbx (RETACRIT) Injectable 43436 Unit(s) IV Push <User Schedule>  ferrous    sulfate 325 milliGRAM(s) Oral two times a day  heparin   Injectable 5000 Unit(s) SubCutaneous every 8 hours  heparin  Infusion Syringe 300 Unit(s)/Hr (0.6 mL/Hr) CRRT <Continuous>  insulin glargine Injectable (LANTUS) 24 Unit(s) SubCutaneous at bedtime  insulin lispro (ADMELOG) corrective regimen sliding scale   SubCutaneous Before meals and at bedtime  insulin lispro Injectable (ADMELOG) 4 Unit(s) SubCutaneous three times a day before meals  levothyroxine 75 MICROGram(s) Oral daily  multivitamin 1 Tablet(s) Oral daily  nystatin    Suspension 967762 Unit(s) Oral four times a day  petrolatum white Ointment 1 Application(s) Topical three times a day  PrismaSATE Dialysate BGK 4 / 2.5 5000 milliLiter(s) (1400 mL/Hr) CRRT <Continuous>  PrismaSOL Filtration BGK 4 / 2.5 5000 milliLiter(s) (1000 mL/Hr) CRRT <Continuous>  PrismaSOL Filtration BGK 4 / 2.5 5000 milliLiter(s) (300 mL/Hr) CRRT <Continuous>  senna 2 Tablet(s) Oral at bedtime  vasopressin Infusion 0.02 Unit(s)/Min (3 mL/Hr) IV Continuous <Continuous>    MEDICATIONS  (PRN):  polyethylene glycol 3350 17 Gram(s) Oral two times a day PRN Constipation      Review of Symptoms:  Constitutional: Awake, Alert, Follows commands  Respiratory: + SOB  Cardiac: Denies CP, Denies Palpitations  Gastrointestinal: Denies Pain, Denies N/V, tolerating po intake  Vascular: Negative  Extremities: + Edema, No joint pain or swelling  Neurological: Negative  Endocrine: No heat or cold intolerance, No excessive thirst  Heme/Onc: Negative    Exam:  General: A/Ox3, DOMINIC, NAD  HEENT: Supple, No JVD, Trachea midline, no masses  Pulmonary: CTAB, = Chest Excursion, no accessory muscle use  Cor: S1S2, RRR, No murmur noted  ECG: SR  Groin/Wound: B/L groins Soft, NT, no hematoma  Gastrointestinal: Soft, NT/ND, + Bowel Sounds  Neuro: = motor and sensory B/L, No focal deficits  Vascular: L SC HD Catheter, B/L 2+ Pedal Edema  Extremities: No joint pain  Skin: Warm/Dry/Normal color, Skin dry and flaking, no rash present.                          7.8    13.41 )-----------( 69       ( 29 Apr 2024 01:04 )             25.5   04-29    132<L>  |  101  |  12  ----------------------------<  199<H>  4.0   |  25  |  1.29    Ca    7.5<L>      29 Apr 2024 06:26  Phos  1.7     04-29  Mg     2.2     04-29    TPro  5.7<L>  /  Alb  2.7<L>  /  TBili  0.6  /  DBili  x   /  AST  12  /  ALT  8<L>  /  AlkPhos  65  04-29  PT/INR - ( 29 Apr 2024 01:04 )   PT: 12.4 sec;   INR: 1.19 ratio         PTT - ( 29 Apr 2024 01:04 )  PTT:31.6 sec    Imaging Reviewed:    Echocardiogram:    Cardiac Catheterization:        Assesment/Plan:    74 y/o female s/p TAVR for severe Aortic Stenosis, Acute on Chronic HFrEF, Acute on Chronic Kidney disease  1.) S/P TAVR: ASA 81 mg po daily, Continue to Monitor Groins. Ambulate as tolerated, Post op TTE looks good  2.) Skin Rash: Treatment per Dermatology. Patient does not appear as red as she did last week, and she states she feels better  3.) Acute on Chronic Kidney Disease: Patient started on HD prior to TAVR during this admission. She is currently on CVVH and will be transitioned to HD tomorrow. Management per Renal Team  4.) Acute on Chronic HFrEF: Continue with GDMT, monitor daily weight, monitor I/O's  Patient will be discharged on an MCOT for a period of 30days to monitor for rhythm changes post TAVR, which was discussed at length with the patient, and any questions were answered.    [place]ed [place]  [id number]

## 2024-04-30 NOTE — PROGRESS NOTE ADULT - SUBJECTIVE AND OBJECTIVE BOX
PROGRESS NOTE:   Authored by Dr. Jane Sandy  Patient is a 73y old  Female who presents with a chief complaint of Syncope (30 Apr 2024 08:49)        OVERNIGHT EVENTS: No acute overnight events. on 1L NC ON for comfort, otherwise on RA. No UOP, BM 4/28. CVVPHD stopped 5 am , Vaso up to 0.05. OOBTC 4 hrs yesterday, pt reports she felt overwhelmed and was in pain afterwards. Did not tolerate well.     SUBJECTIVE: Patient was seen and examined at bedside this morning.   Denies any nausea/vomiting/diarrhea, headache, shortness of breath, abdominal pain or chest pain/palpitations.   Patient responding appropriately to questions and able to make needs known.   Vital signs/imaging/labs/telemetry events reviewed.   No acute complaints or concerns. Pt is feeling well. Tolerating PO.  Stating she feels fine.     All other ROS neg except as above       MEDICATIONS  (STANDING):  ascorbic acid 500 milliGRAM(s) Oral daily  aspirin  chewable 81 milliGRAM(s) Oral daily  atorvastatin 80 milliGRAM(s) Oral at bedtime  calamine/zinc oxide Lotion 1 Application(s) Topical three times a day  chlorhexidine 2% Cloths 1 Application(s) Topical daily  chlorhexidine 4% Liquid 1 Application(s) Topical <User Schedule>  CRRT Treatment    <Continuous>  epoetin mendoza-epbx (RETACRIT) Injectable 74524 Unit(s) IV Push <User Schedule>  ferrous    sulfate 325 milliGRAM(s) Oral two times a day  heparin   Injectable 5000 Unit(s) SubCutaneous every 8 hours  heparin  Infusion Syringe 300 Unit(s)/Hr (0.6 mL/Hr) CRRT <Continuous>  insulin glargine Injectable (LANTUS) 24 Unit(s) SubCutaneous at bedtime  insulin lispro (ADMELOG) corrective regimen sliding scale   SubCutaneous Before meals and at bedtime  insulin lispro Injectable (ADMELOG) 4 Unit(s) SubCutaneous three times a day before meals  levothyroxine 75 MICROGram(s) Oral daily  multivitamin 1 Tablet(s) Oral daily  mupirocin 2% Ointment 1 Application(s) Topical three times a day  nystatin    Suspension 387317 Unit(s) Oral four times a day  petrolatum white Ointment 1 Application(s) Topical three times a day  PrismaSATE Dialysate BGK 4 / 2.5 5000 milliLiter(s) (1400 mL/Hr) CRRT <Continuous>  PrismaSOL Filtration BGK 4 / 2.5 5000 milliLiter(s) (300 mL/Hr) CRRT <Continuous>  PrismaSOL Filtration BGK 4 / 2.5 5000 milliLiter(s) (1000 mL/Hr) CRRT <Continuous>  senna 2 Tablet(s) Oral at bedtime  vasopressin Infusion 0.02 Unit(s)/Min (3 mL/Hr) IV Continuous <Continuous>    MEDICATIONS  (PRN):  polyethylene glycol 3350 17 Gram(s) Oral two times a day PRN Constipation      CAPILLARY BLOOD GLUCOSE      POCT Blood Glucose.: 284 mg/dL (30 Apr 2024 07:44)  POCT Blood Glucose.: 196 mg/dL (29 Apr 2024 22:20)  POCT Blood Glucose.: 171 mg/dL (29 Apr 2024 16:37)  POCT Blood Glucose.: 143 mg/dL (29 Apr 2024 11:34)    I&O's Summary    29 Apr 2024 07:01  -  30 Apr 2024 07:00  --------------------------------------------------------  IN: 2015.7 mL / OUT: 7658 mL / NET: -5642.3 mL    30 Apr 2024 07:01  -  30 Apr 2024 10:16  --------------------------------------------------------  IN: 342.3 mL / OUT: 0 mL / NET: 342.3 mL        PHYSICAL EXAM:  Vital Signs Last 24 Hrs  T(C): 36.9 (30 Apr 2024 08:00), Max: 37.1 (29 Apr 2024 19:00)  T(F): 98.5 (30 Apr 2024 08:00), Max: 98.7 (29 Apr 2024 19:00)  HR: 91 (30 Apr 2024 10:00) (80 - 107)  BP: --  BP(mean): --  RR: 17 (30 Apr 2024 10:00) (7 - 29)  SpO2: 94% (30 Apr 2024 10:00) (93% - 100%)    Parameters below as of 30 Apr 2024 10:00  Patient On (Oxygen Delivery Method): room air        PHYSICAL EXAM:     GENERAL: NAD  HEAD:  Atraumatic, Normocephalic  EYES: EOMI, conjunctiva and sclera clear, no nystagmus noted  ENT: Moist mucous membranes  CHEST/LUNG: normal work of breathing, Clear to auscultation bilaterally; No rales, rhonchi, wheezing, or rubs. Unlabored respirations  HEART:  Regular rate and rhythm; No murmurs, rubs, or gallops, normal S1/S2  ABDOMEN: normal bowel sounds; Soft, nontender, nondistended, no organomegaly   EXTREMITIES:  extensive chronic edema in b/l LE, dec Peripheral Pulses, No clubbing, cyanosis  MSK: No gross deformities noted, ROM wnl   Neurological:  A&Ox3, no focal deficits   SKIN: dry peeling flakey pale,  scattered psoriatic plaques throughout extremities and torso, no blistering  or  drainage  BL LE soft plaques easily lifted w/ mechanical debridement , w/ open skin & blistering and scant drainage  Rt plantar foot w/ dark blister around callous  No odor, erythema, increased warmth, tenderness, induration, fluctuance, nor crepitus  PSYCH: Normal mood, affect          LABS:                        7.8    15.35 )-----------( 95       ( 30 Apr 2024 00:31 )             25.3     04-30    131<L>  |  99  |  12  ----------------------------<  225<H>  4.4   |  22  |  1.31<H>    Ca    7.5<L>      30 Apr 2024 00:33  Phos  1.5     04-30  Mg     2.2     04-30    TPro  5.7<L>  /  Alb  3.0<L>  /  TBili  0.7  /  DBili  x   /  AST  16  /  ALT  12  /  AlkPhos  74  04-30    PT/INR - ( 30 Apr 2024 00:32 )   PT: 11.8 sec;   INR: 1.07 ratio         PTT - ( 30 Apr 2024 00:32 )  PTT:31.0 sec        Culture - Blood (collected 27 Apr 2024 12:20)  Source: .Blood Blood  Preliminary Report (29 Apr 2024 19:01):    No growth at 48 Hours    Culture - Blood (collected 27 Apr 2024 12:19)  Source: .Blood Blood  Preliminary Report (29 Apr 2024 19:01):    No growth at 48 Hours        Tele Reviewed:    RADIOLOGY & ADDITIONAL TESTS:  Results Reviewed: yes  Imaging Personally Reviewed: yes  Electrocardiogram Personally Reviewed: yes

## 2024-04-30 NOTE — PROGRESS NOTE ADULT - ATTENDING COMMENTS
Initially admitted with syncope in the setting of severe AS s/p TAVR   Hospital course has been complicated by VT/VF cardiac arrest, DUNCAN requiring CVVH, heart block requiring PPM and contrast-induced skin sloughing  A+Ox3  Shock requiring Vasopressin in the setting of CVVH, likely due to intravascular depletion - wean as able  TTE with severely reduced LVEF  O2 sats mid 90s on room air  DASH/diabetic diet  DUNCAN requiring CVVH at 250 cc hour; patient is very total body volume overloaded - will continue  H/H low but acceptable on HSQ for DVT prophylaxis   Afebrile, no antibiotics  Sugars controlled on Lantus  Endocrine if following for adrenal nodule and concern for Cushings - f/u recs  Marily 4/24 and L subclavian Rocky 4/26

## 2024-04-30 NOTE — PROGRESS NOTE ADULT - ASSESSMENT
73F w HFrEF (EF 30%), mod AS, known LBBB, HTN, IDDM1, CKD, hypothyroidism, chronic lymphedema, p/w 1 episode of syncope with R arm jerking.     #Syncope-Aortic Stenosis  - Known Aortic Stenosis likely Severe in setting of decreased LVEF  - s/p tavr on 4/24 c/b VT -> shock -> VFib -> shock  - hypoxic/congested, and was intubated, now extubated on 4/25 in the evening, on NC   - on 4/25 with worsening bradycardia, and now s/p TVP placement followed by AV Micra placement with removal of TVP  - Remains in Sinus Rhythm/known lbbb  - cont pressors as needed, currently on Vaso  - off cvvhd, now for HD per renal    #Chronic Systolic HF (EF 30%), mod to severe AS, HTN, LBBB, Lymphedema   - Has known systolic HF and AS.    - Repeat TTE showed EF 30%, mild-mod LVH, mildly reduced RVF, mod-severe AS (.61 cmsq), mod pHTN  - On home Torsemide 20 mg daily, Eplerenone 25 mg daily, and Toprol  mg daily  - proBNP 51K from 80k.   - GDMT on hold in the setting of shock    #LBBB  Known LBBB  Noted intermittent Wenckebach on telemetry with bradycardia and 2:1 AV conduction  - s/p tvp placement followed by AV Micra placement and removal of TVP  - Sinus Rhythm    - very high risk of decompensation,

## 2024-04-30 NOTE — PROGRESS NOTE ADULT - ASSESSMENT
73F w HFrEF (EF 30%), mod AS, known LBBB, HTN, IDDM1, CKD, hypothyroidism, chronic lymphedema, suspected OHS/LELIA on 3L NC home O2 p/w 1 episode of syncope. Recent admission at [facility] (3/29-4/5) for ADHF and DUNCAN s/p diuresis, discharged with new home O2 3L NC suspecting OHS/LELIA pending outpatient w/u. Since discharge a week ago, she has been staying at home with   Pt had sob walking to car. Once in car, she was still breathing heavily. Partner noticed pt was not responding to verbal stimuli for 10-15sec and witnessed R arm jerking. Pt gained consciousness quickly without postictal symptoms. Pt went to Cardiologist Dr. Nathan who recommended pt to come to ED. No fever, cp, abd pain, n/v. Leg swelling is improved from prior. Pt on torsemide 40mg which she has been taking. Pt was discharged on 3LNC which she is currently on. Patient reports she did not take Farxiga that she was discharged on as she was concerned about side effects.     In ED, patient was found afebrile, hemodynamically stable, breathing well on 3L NC. Initial labs notable for mild hyponatremia, DUNCAN on CKD, elevated proBNP and elevated pCO2 both improved from prior.  pt also noticed with abn creatinine      1- CKD IV  with DUNCAN   2- CHF   3- lymphedema   4- severe AS  s/p tavr 4/24  5- syncope   6- hyperkalemia  7- hypotension         suspect ATN from hypotension along with contrast nephropathy   creatinine worsening requiring renal replacement therapy, HD initiated 4/18  still extensively fluid overloaded   cont CRRT  bfr 200 heparin 300 unit/hr to circuit and cont  dfr 1400 fluid removal 180  cc/hr fluid removal per hr   see new orders   structural heart team following, s/p TAVR  derm following for rash  pressor support  with vasopressin   d.w CCU team

## 2024-04-30 NOTE — PROGRESS NOTE ADULT - SUBJECTIVE AND OBJECTIVE BOX
Wound Surgery Progress Note:    HPI:  73F w HFrEF (EF 30%), mod AS, known LBBB, HTN, IDDM1, CKD, hypothyroidism, chronic lymphedema, suspected OHS/LELIA on 3L NC home O2 p/w 1 episode of syncope. Recent admission at [facility] (3/29-4/5) for ADHF and DUNCAN s/p diuresis, discharged with new home O2 3L NC suspecting OHS/LELIA pending outpatient w/u. Since discharge a week ago, she has been staying at home with   Pt had sob walking to car. Once in car, she was still breathing heavily. Partner noticed pt was not responding to verbal stimuli for 10-15sec and witnessed R arm jerking. Pt gained consciousness quickly without postictal symptoms. Pt went to Cardiologist Dr. Nathan who recommended pt to come to ED. No fever, cp, abd pain, n/v. Leg swelling is improved from prior. Pt on torsemide 40mg which she has been taking. Pt was discharged on 3LNC which she is currently on. Patient reports she did not take Farxiga that she was discharged on as she was concerned about side effects.     In ED, patient was found afebrile, hemodynamically stable, breathing well on 3L NC. Initial labs notable for mild hyponatremia, DUNCAN on CKD, elevated proBNP and elevated pCO2 both improved from prior. (12 Apr 2024 16:31)    Follow up visit to patient for management of bilateral leg edema and wounds performed today with Dr. Syed. Ms. Chowdhury was encountered on an alternating air with low air loss surface. She has bilateral foot wounds that are being managed by Podiatry. Her leg were wrapped with ace wraps which were removed for assessment. She has applied compression in the past and has a lymphedema pump and compression stockings at home that she uses. Her feet were cool and bilateral pedal pulses were not manually palpable. Her last RENEE/PVR was 8.7.23 and were not WNL. Recommend repeat RENEE/PVR and holding compression wraps until these results are reviewed. Recommend topical therapy as below with ace wraps only until that time. No signs/symptoms of infection related to any of the leg wounds noted. This was communicated to primary team who were rounding at the time of visit.    PAST MEDICAL & SURGICAL HISTORY:  Hypertension  Diabetes  Lymphedema  H/O left bundle branch block  History of left bundle branch block (LBBB)  Systolic heart failure, chronic  Type 1 diabetes  H/O cataract, 2020  History of surgical removal of meniscus of knee, left in 1971  Frozen shoulder, 2000  H/O Achilles tendon repair, lengthened bilaterally, 2000  Fractured skull, 1968    REVIEW OF SYSTEMS:  CONSTITUTIONAL: + weakness, no fevers or chills  EYES/ENT: No visual changes;  No vertigo or throat pain   MOUTH: No oral lesion, moist  NECK: No pain or stiffness  RESPIRATORY: No cough, wheezing, hemoptysis; No shortness of breath  CARDIOVASCULAR: No chest pain or palpitations  GASTROINTESTINAL: No abdominal or epigastric pain. No nausea, vomiting, or hematemesis; No diarrhea or constipation. No melena or hematochezia.  GENITOURINARY: No dysuria, frequency or hematuria  NEUROLOGICAL: + numbness, no weakness  SKIN: No itching, rashes  PSYCH: no confusion or altered mental status    MEDICATIONS  (STANDING):  ascorbic acid 500 milliGRAM(s) Oral daily  aspirin  chewable 81 milliGRAM(s) Oral daily  atorvastatin 80 milliGRAM(s) Oral at bedtime  calamine/zinc oxide Lotion 1 Application(s) Topical three times a day  chlorhexidine 2% Cloths 1 Application(s) Topical daily  chlorhexidine 4% Liquid 1 Application(s) Topical <User Schedule>  CRRT Treatment    <Continuous>  epoetin mendoza-epbx (RETACRIT) Injectable 59152 Unit(s) IV Push <User Schedule>  ferrous    sulfate 325 milliGRAM(s) Oral two times a day  heparin   Injectable 5000 Unit(s) SubCutaneous every 8 hours  heparin  Infusion Syringe 300 Unit(s)/Hr (0.6 mL/Hr) CRRT <Continuous>  insulin glargine Injectable (LANTUS) 25 Unit(s) SubCutaneous at bedtime  insulin lispro (ADMELOG) corrective regimen sliding scale   SubCutaneous Before meals and at bedtime  insulin lispro Injectable (ADMELOG) 5 Unit(s) SubCutaneous three times a day before meals  levothyroxine 75 MICROGram(s) Oral daily  multivitamin 1 Tablet(s) Oral daily  mupirocin 2% Ointment 1 Application(s) Topical three times a day  nystatin    Suspension 750966 Unit(s) Oral four times a day  petrolatum white Ointment 1 Application(s) Topical three times a day  PrismaSATE Dialysate BGK 4 / 2.5 5000 milliLiter(s) (1400 mL/Hr) CRRT <Continuous>  PrismaSOL Filtration BGK 4 / 2.5 5000 milliLiter(s) (1000 mL/Hr) CRRT <Continuous>  PrismaSOL Filtration BGK 4 / 2.5 5000 milliLiter(s) (300 mL/Hr) CRRT <Continuous>  senna 2 Tablet(s) Oral at bedtime  sodium phosphate 30 milliMole(s)/500 mL IVPB 30 milliMole(s) IV Intermittent once  vasopressin Infusion 0.02 Unit(s)/Min (3 mL/Hr) IV Continuous <Continuous>    MEDICATIONS  (PRN):  polyethylene glycol 3350 17 Gram(s) Oral two times a day PRN Constipation    Allergies    contrast media (iodine-based) (Fever; Vomiting; Flushing; Hypotension; Rash; Stomach Upset)  Ativan (Rash; Urticaria)  penicillins (Hives (Mod to Severe); Anaphylaxis (Mod to Severe); Short breath (Mod to Severe); Angioedema (Mod to Severe))    Intolerances    SOCIAL HISTORY:  single, Denies smoking, ETOH, drugs    FAMILY HISTORY:  Family history of CVA  mom, age 57    Vital Signs Last 24 Hrs  T(C): 36.9 (30 Apr 2024 08:00), Max: 37.1 (29 Apr 2024 19:00)  T(F): 98.5 (30 Apr 2024 08:00), Max: 98.7 (29 Apr 2024 19:00)  HR: 79 (30 Apr 2024 12:00) (79 - 107)  BP: --  BP(mean): --  RR: 23 (30 Apr 2024 12:00) (7 - 29)  SpO2: 95% (30 Apr 2024 12:00) (93% - 100%)    Parameters below as of 30 Apr 2024 12:00  Patient On (Oxygen Delivery Method): room air    Physical Exam:  General: alert and oriented, obese  Ophthamology: sclera clear  ENMT: moist mucous membranes, trachea midline  Respiratory: equal chest rise with respirations  Gastrointestinal: soft NT/ND  Neurology: verbal, following commands  Psych: calm, appropriate  Musculoskeletal: no contractures  Vascular: BLE edema equal, bilateral feet cool, unable to manually palpate bilateral pedal pulses  Skin:  Left anterior lower leg wound -  with deep maroon discoloration and central superficially denuded skin in an irregular pattern L 5.5cm X W 2cm x D0.1cm, scant serosanguinous drainage  Left posterior lower leg wound - deep maroon discoloration with superficially denuded skin in a horizontal pattern, L 1cmx  w 2.5cm x D 0.1cm, scant serosanguinous drainage  Right anterior lower leg wounds with appearance of scratches in linear, vertical pattern with deep maroon discoloration and superficial denuded skin L 14 x W 11cm x D 0.1cm, scant serosanguinous drainage  Periwound skin on bilateral legs is dry, flaking plaques, +3edema  Bilateral foot wounds per Podiatry  No odor, erythema, increased warmth, tenderness, induration, fluctuance    LABS:  04-30    131<L>  |  99  |  12  ----------------------------<  225<H>  4.4   |  22  |  1.31<H>    Ca    7.5<L>      30 Apr 2024 00:33  Phos  1.5     04-30  Mg     2.2     04-30    TPro  5.7<L>  /  Alb  3.0<L>  /  TBili  0.7  /  DBili  x   /  AST  16  /  ALT  12  /  AlkPhos  74  04-30                          7.8    15.35 )-----------( 95       ( 30 Apr 2024 00:31 )             25.3     PT/INR - ( 30 Apr 2024 00:32 )   PT: 11.8 sec;   INR: 1.07 ratio         PTT - ( 30 Apr 2024 00:32 )  PTT:31.0 sec  Urinalysis Basic - ( 30 Apr 2024 00:33 )    Color: x / Appearance: x / SG: x / pH: x  Gluc: 225 mg/dL / Ketone: x  / Bili: x / Urobili: x   Blood: x / Protein: x / Nitrite: x   Leuk Esterase: x / RBC: x / WBC x   Sq Epi: x / Non Sq Epi: x / Bacteria: x      RADIOLOGY & ADDITIONAL STUDIES:    EXAM:  PHYSIOL EXTREM LOW 3+ LEV BI   ORDERED BY: NOAM ROSENBERG     PROCEDURE DATE:  08/07/2023          INTERPRETATION:  History: Nonhealing wound right foot    Risk factors: Hypertension, hyperlipidemia, diabetes    The right ankle-brachial index of 0.80 and the left ankle-brachial index   of 0.86 are moderately abnormal.    The blood pressure measurements bilaterally at the levels of the upper   thighs, lower thighs and ankles are all in excess of 240 mmHg.    Artificially elevated blood pressure measurements are characteristic for   calcified, noncompressible diabetic arteries.    The blood pressure measurements at the right ankle are 158 mmHg in the   posterior tibial artery and 175 in the dorsal pedis artery.    The blood pressure measurement at the right great toe is 141 mmHg and the   right toe-brachial index equals 0.64.    The blood pressure measurements at the left ankle are 189 mmHg in the   posterior tibial artery and 182 mmHg in the dorsal pedis artery.    The blood pressure measurement at the left great toe is 149 mmHg and the   left toe-brachial index equals 0.68.    The amplitude of the pulse volume recordings on the right are reduced at   the levels of the upper and lower thigh, near normal at the levels of the   calf and ankle and normal at the levels of the metatarsals and toes.    The amplitude of the pulse volume recordings on the left are moderately   reduced at the levels of the upper and lower thigh, slightly reduced at   the levels of the calf and ankles, normal at the metatarsals and reduced   at the level of the toes.    IMPRESSION:    The quality of this examination is adversely impacted by the   noncompressible nature of the diabetic arteries.    The right RENEE of 0.80 and the left RENEE of 0.86 are mildly abnormal in a   range associated with claudication.         Wound Surgery Progress Note:    HPI:  73F w HFrEF (EF 30%), mod AS, known LBBB, HTN, IDDM1, CKD, hypothyroidism, chronic lymphedema, suspected OHS/LELIA on 3L NC home O2 p/w 1 episode of syncope. Recent admission at [facility] (3/29-4/5) for ADHF and DUNCAN s/p diuresis, discharged with new home O2 3L NC suspecting OHS/LELIA pending outpatient w/u. Since discharge a week ago, she has been staying at home with   Pt had sob walking to car. Once in car, she was still breathing heavily. Partner noticed pt was not responding to verbal stimuli for 10-15sec and witnessed R arm jerking. Pt gained consciousness quickly without postictal symptoms. Pt went to Cardiologist Dr. Nathan who recommended pt to come to ED. No fever, cp, abd pain, n/v. Leg swelling is improved from prior. Pt on torsemide 40mg which she has been taking. Pt was discharged on 3LNC which she is currently on. Patient reports she did not take Farxiga that she was discharged on as she was concerned about side effects.     In ED, patient was found afebrile, hemodynamically stable, breathing well on 3L NC. Initial labs notable for mild hyponatremia, DUNCAN on CKD, elevated proBNP and elevated pCO2 both improved from prior. (12 Apr 2024 16:31)    Follow up visit to patient for management of bilateral leg edema and wounds performed today with Dr. Syed. Ms. Chowdhury was encountered on an alternating air with low air loss surface. She has bilateral foot wounds that are being managed by Podiatry. Her leg were wrapped with ace wraps which were removed for assessment. She has applied compression in the past and has a lymphedema pump and compression stockings at home that she uses. Her feet were cool and bilateral pedal pulses were not manually palpable. Her last RENEE/PVR was 8.7.23 and were not WNL. Recommend repeat RENEE/PVR and holding multilayer compression wraps until these results are reviewed. Recommend topical therapy as below with ace wraps only until that time. No signs/symptoms of infection related to any of the leg wounds noted. This was communicated to primary team who were rounding at the time of visit.    PAST MEDICAL & SURGICAL HISTORY:  Hypertension  Diabetes  Lymphedema  H/O left bundle branch block  History of left bundle branch block (LBBB)  Systolic heart failure, chronic  Type 1 diabetes  H/O cataract, 2020  History of surgical removal of meniscus of knee, left in 1971  Frozen shoulder, 2000  H/O Achilles tendon repair, lengthened bilaterally, 2000  Fractured skull, 1968    REVIEW OF SYSTEMS:  CONSTITUTIONAL: + weakness, no fevers or chills  EYES/ENT: No visual changes;  No vertigo or throat pain   MOUTH: No oral lesion, moist  NECK: No pain or stiffness  RESPIRATORY: No cough, wheezing, hemoptysis; No shortness of breath  CARDIOVASCULAR: No chest pain or palpitations  GASTROINTESTINAL: No abdominal or epigastric pain. No nausea, vomiting, or hematemesis; No diarrhea or constipation. No melena or hematochezia.  GENITOURINARY: No dysuria, frequency or hematuria  NEUROLOGICAL: + numbness, no weakness  SKIN: No itching, rashes  PSYCH: no confusion or altered mental status    MEDICATIONS  (STANDING):  ascorbic acid 500 milliGRAM(s) Oral daily  aspirin  chewable 81 milliGRAM(s) Oral daily  atorvastatin 80 milliGRAM(s) Oral at bedtime  calamine/zinc oxide Lotion 1 Application(s) Topical three times a day  chlorhexidine 2% Cloths 1 Application(s) Topical daily  chlorhexidine 4% Liquid 1 Application(s) Topical <User Schedule>  CRRT Treatment    <Continuous>  epoetin mendoza-epbx (RETACRIT) Injectable 08319 Unit(s) IV Push <User Schedule>  ferrous    sulfate 325 milliGRAM(s) Oral two times a day  heparin   Injectable 5000 Unit(s) SubCutaneous every 8 hours  heparin  Infusion Syringe 300 Unit(s)/Hr (0.6 mL/Hr) CRRT <Continuous>  insulin glargine Injectable (LANTUS) 25 Unit(s) SubCutaneous at bedtime  insulin lispro (ADMELOG) corrective regimen sliding scale   SubCutaneous Before meals and at bedtime  insulin lispro Injectable (ADMELOG) 5 Unit(s) SubCutaneous three times a day before meals  levothyroxine 75 MICROGram(s) Oral daily  multivitamin 1 Tablet(s) Oral daily  mupirocin 2% Ointment 1 Application(s) Topical three times a day  nystatin    Suspension 594682 Unit(s) Oral four times a day  petrolatum white Ointment 1 Application(s) Topical three times a day  PrismaSATE Dialysate BGK 4 / 2.5 5000 milliLiter(s) (1400 mL/Hr) CRRT <Continuous>  PrismaSOL Filtration BGK 4 / 2.5 5000 milliLiter(s) (1000 mL/Hr) CRRT <Continuous>  PrismaSOL Filtration BGK 4 / 2.5 5000 milliLiter(s) (300 mL/Hr) CRRT <Continuous>  senna 2 Tablet(s) Oral at bedtime  sodium phosphate 30 milliMole(s)/500 mL IVPB 30 milliMole(s) IV Intermittent once  vasopressin Infusion 0.02 Unit(s)/Min (3 mL/Hr) IV Continuous <Continuous>    MEDICATIONS  (PRN):  polyethylene glycol 3350 17 Gram(s) Oral two times a day PRN Constipation    Allergies    contrast media (iodine-based) (Fever; Vomiting; Flushing; Hypotension; Rash; Stomach Upset)  Ativan (Rash; Urticaria)  penicillins (Hives (Mod to Severe); Anaphylaxis (Mod to Severe); Short breath (Mod to Severe); Angioedema (Mod to Severe))    Intolerances    SOCIAL HISTORY:  single, Denies smoking, ETOH, drugs    FAMILY HISTORY:  Family history of CVA  mom, age 57    Vital Signs Last 24 Hrs  T(C): 36.9 (30 Apr 2024 08:00), Max: 37.1 (29 Apr 2024 19:00)  T(F): 98.5 (30 Apr 2024 08:00), Max: 98.7 (29 Apr 2024 19:00)  HR: 79 (30 Apr 2024 12:00) (79 - 107)  BP: --  BP(mean): --  RR: 23 (30 Apr 2024 12:00) (7 - 29)  SpO2: 95% (30 Apr 2024 12:00) (93% - 100%)    Parameters below as of 30 Apr 2024 12:00  Patient On (Oxygen Delivery Method): room air    Physical Exam:  General: alert and oriented, obese  Ophthamology: sclera clear  ENMT: moist mucous membranes, trachea midline  Respiratory: equal chest rise with respirations  Gastrointestinal: soft NT/ND  Neurology: verbal, following commands  Psych: calm, appropriate  Musculoskeletal: no contractures  Vascular: BLE edema equal, bilateral feet cool, unable to manually palpate bilateral pedal pulses  Skin:  Left anterior lower leg wound -  with deep maroon discoloration and central superficially denuded skin in an irregular pattern L 5.5cm X W 2cm x D0.1cm, scant serosanguinous drainage  Left posterior lower leg wound - deep maroon discoloration with superficially denuded skin in a horizontal pattern, L 1cmx  w 2.5cm x D 0.1cm, scant serosanguinous drainage  Right anterior lower leg wounds with appearance of scratches in linear, vertical pattern with deep maroon discoloration and superficial denuded skin L 14 x W 11cm x D 0.1cm, scant serosanguinous drainage  Periwound skin on bilateral legs is dry, flaking plaques, +3edema  Bilateral foot wounds per Podiatry  No odor, erythema, increased warmth, tenderness, induration, fluctuance    LABS:  04-30    131<L>  |  99  |  12  ----------------------------<  225<H>  4.4   |  22  |  1.31<H>    Ca    7.5<L>      30 Apr 2024 00:33  Phos  1.5     04-30  Mg     2.2     04-30    TPro  5.7<L>  /  Alb  3.0<L>  /  TBili  0.7  /  DBili  x   /  AST  16  /  ALT  12  /  AlkPhos  74  04-30                          7.8    15.35 )-----------( 95       ( 30 Apr 2024 00:31 )             25.3     PT/INR - ( 30 Apr 2024 00:32 )   PT: 11.8 sec;   INR: 1.07 ratio         PTT - ( 30 Apr 2024 00:32 )  PTT:31.0 sec  Urinalysis Basic - ( 30 Apr 2024 00:33 )    Color: x / Appearance: x / SG: x / pH: x  Gluc: 225 mg/dL / Ketone: x  / Bili: x / Urobili: x   Blood: x / Protein: x / Nitrite: x   Leuk Esterase: x / RBC: x / WBC x   Sq Epi: x / Non Sq Epi: x / Bacteria: x      RADIOLOGY & ADDITIONAL STUDIES:    EXAM:  PHYSIOL EXTREM LOW 3+ LEV BI   ORDERED BY: NOAM ROSENBERG     PROCEDURE DATE:  08/07/2023          INTERPRETATION:  History: Nonhealing wound right foot    Risk factors: Hypertension, hyperlipidemia, diabetes    The right ankle-brachial index of 0.80 and the left ankle-brachial index   of 0.86 are moderately abnormal.    The blood pressure measurements bilaterally at the levels of the upper   thighs, lower thighs and ankles are all in excess of 240 mmHg.    Artificially elevated blood pressure measurements are characteristic for   calcified, noncompressible diabetic arteries.    The blood pressure measurements at the right ankle are 158 mmHg in the   posterior tibial artery and 175 in the dorsal pedis artery.    The blood pressure measurement at the right great toe is 141 mmHg and the   right toe-brachial index equals 0.64.    The blood pressure measurements at the left ankle are 189 mmHg in the   posterior tibial artery and 182 mmHg in the dorsal pedis artery.    The blood pressure measurement at the left great toe is 149 mmHg and the   left toe-brachial index equals 0.68.    The amplitude of the pulse volume recordings on the right are reduced at   the levels of the upper and lower thigh, near normal at the levels of the   calf and ankle and normal at the levels of the metatarsals and toes.    The amplitude of the pulse volume recordings on the left are moderately   reduced at the levels of the upper and lower thigh, slightly reduced at   the levels of the calf and ankles, normal at the metatarsals and reduced   at the level of the toes.    IMPRESSION:    The quality of this examination is adversely impacted by the   noncompressible nature of the diabetic arteries.    The right RENEE of 0.80 and the left RENEE of 0.86 are mildly abnormal in a   range associated with claudication.

## 2024-04-30 NOTE — PROGRESS NOTE ADULT - ASSESSMENT
73F w HFrEF (EF 30%), mod AS, known LBBB, HTN, T1DM, CKD, hypothyroidism, chronic lymphedema, suspected OHS/LELIA on 3L NC home O2 p/w 1 episode of syncope with R arm jerking, likely 2/2 severe symptomatic AS. CTA imagings performed for TAVR eval, c/b DUNCAN on CKD. C/b febrile and hypotension, sepsis workup pending. CT c/a/p w/w/o IV contrast revealed incidental finding of L adrenal nodule.    #L adrenal nodule   Incidentally found on CT c/a/p w/wo IV contrast done for TAVR evaluation. The left adrenal gland is thickened and a 1.2 x 0.8 cm left adrenal nodule is seen. The right adrenal gland is normal.  Primary team discussed with radiology. HU of the adrenal nodule not able to be accurately determined due to motion artifact, also given imaging was taken at venous phase.     Hx HTN, T1DM, obesity difficulty losing weight.    Test for excess adrenal hormones:  - Dex suppression test: AM cortisol 9.4 not suppressed with sufficiently high dexamethasone 374 (4/18/24). Repeat test AM cortisol 4.1 not suppressed with ACTH 9.5, dex level pending. Concerning for possible Cushings, likely primary adrenal source given ACTH not elevated. Follow up 24 hour urine cortisol and creatinine as confirmatory testing to r/o Cushings'. Salivary cortisol cancelled for QNS.  - plasma metanephrines 46.8 normal range   - serum aldosterone is elevated to 37.4. Plasma renin activity 9.775. Ratio = 3.82, not elevated, no hyperaldosteronism.    - DHEAS 139 wnl. Androstenedione <10.  - Urine cortisol low at 1.2mcg/24 hour but only 200ml for 24 hours    Recommendations:  - Follow up remaining cushings' workup: needs to repeat 24 hour urine cortisol once renal function improved and no longer on CRRT  - Midnight salivary cortisol results  - Repeat 1mg DST second time if not done    - Nonurgent CT non-contrast adrenal protocol to determine hounsfield units of adrenal nodule. (Of note, CT features suggesting malignancy include HU >10, mass size >4cm, and >50% contrast retention seen 10 minutes after contrast administration (contrast retention is of low specificity/sensitivity).)  - Will need follow up outpatient with Dr. Luis Dumont    #T1DM  #DKA  Has hx of T1DM since age 26  Endocrinologist: Dr. Luis Dumont  Home regimen: Lantus 33 units qhs, Novolog based on sliding scale TIDAC normally 3-5 units  Has Dexcom G7  A1c is 7.4%  C peptide undetectable <0.1 with glucose 107, confirming insulin deficiency  Transitioned to basal/bolus from insulin gtt  - Recommend increase Lantus 26 units QHS  - Recommend increase Admelog to 7 units TIDAC  - Low dose admelog correction scale TIDAC, Low dose admelog correction scale QHS  - Goal BG in -180  - Of note, patient instructed on discharge from recent hospitalization at [place] to start farxiga for CHF. Given risks of DKA of SGLT2i in T1DM, would not start until better data is available for its benefits.  - Discharge regimen: home basal/bolus insulin, dose TBD.  - Follow up with Dr. Luis Dumont outpatient    #Hx of Hypothyroidism  TSH slightly elevated to 4.79-5.06 with normal FT4 1.3-1.5  - continue home LT4 75mcg daily  - repeat TFT's outpatient when clinically stabilized    ICU patient requires close monitoring.   Discussed recommendations with primary team provider.     Ronal Olivarez MD  Attending Physician   Division of Endocrinology, Diabetes and Metabolism   9AM-5PM: Please contact via simfy 9-5: [email or web address]  After hours and weekends/holidays: [phone]  Please note that this patient may be followed by a different provider tomorrow.   For final dc reccomendations, please call [phone] or page the endocrine fellow on call.  Assistance with non-urgent matters: [email or web address]

## 2024-04-30 NOTE — PROGRESS NOTE ADULT - SUBJECTIVE AND OBJECTIVE BOX
[facility] - Howard Kimble, Carlos Luong, Herman Alston  Office Number:  [phone]    Patient resting comfortably in bed in NAD.    feeling better overall   off cvvhd as of 5 am  remains on vaso  no chest pain or dyspnea    ROS: negative unless otherwise mentioned.    Telemetry: sr, bbb     MEDICATIONS  (STANDING):  ascorbic acid 500 milliGRAM(s) Oral daily  aspirin  chewable 81 milliGRAM(s) Oral daily  atorvastatin 80 milliGRAM(s) Oral at bedtime  calamine/zinc oxide Lotion 1 Application(s) Topical three times a day  chlorhexidine 2% Cloths 1 Application(s) Topical daily  chlorhexidine 4% Liquid 1 Application(s) Topical <User Schedule>  CRRT Treatment    <Continuous>  epoetin mendoza-epbx (RETACRIT) Injectable 22481 Unit(s) IV Push <User Schedule>  ferrous    sulfate 325 milliGRAM(s) Oral two times a day  heparin   Injectable 5000 Unit(s) SubCutaneous every 8 hours  heparin  Infusion Syringe 300 Unit(s)/Hr (0.6 mL/Hr) CRRT <Continuous>  insulin glargine Injectable (LANTUS) 24 Unit(s) SubCutaneous at bedtime  insulin lispro (ADMELOG) corrective regimen sliding scale   SubCutaneous Before meals and at bedtime  insulin lispro Injectable (ADMELOG) 4 Unit(s) SubCutaneous three times a day before meals  levothyroxine 75 MICROGram(s) Oral daily  multivitamin 1 Tablet(s) Oral daily  mupirocin 2% Ointment 1 Application(s) Topical three times a day  nystatin    Suspension 293803 Unit(s) Oral four times a day  petrolatum white Ointment 1 Application(s) Topical three times a day  PrismaSATE Dialysate BGK 4 / 2.5 5000 milliLiter(s) (1400 mL/Hr) CRRT <Continuous>  PrismaSOL Filtration BGK 4 / 2.5 5000 milliLiter(s) (1000 mL/Hr) CRRT <Continuous>  PrismaSOL Filtration BGK 4 / 2.5 5000 milliLiter(s) (300 mL/Hr) CRRT <Continuous>  senna 2 Tablet(s) Oral at bedtime  vasopressin Infusion 0.02 Unit(s)/Min (3 mL/Hr) IV Continuous <Continuous>    MEDICATIONS  (PRN):  polyethylene glycol 3350 17 Gram(s) Oral two times a day PRN Constipation      Allergies    contrast media (iodine-based) (Fever; Vomiting; Flushing; Hypotension; Rash; Stomach Upset)  Ativan (Rash; Urticaria)  penicillins (Hives (Mod to Severe); Anaphylaxis (Mod to Severe); Short breath (Mod to Severe); Angioedema (Mod to Severe))    Intolerances        Vital Signs Last 24 Hrs  T(C): 36.9 (30 Apr 2024 08:00), Max: 37.1 (29 Apr 2024 19:00)  T(F): 98.5 (30 Apr 2024 08:00), Max: 98.7 (29 Apr 2024 19:00)  HR: 103 (30 Apr 2024 08:30) (80 - 107)  BP: --  BP(mean): --  RR: 21 (30 Apr 2024 08:30) (7 - 29)  SpO2: 96% (30 Apr 2024 08:30) (93% - 100%)    Parameters below as of 30 Apr 2024 08:00  Patient On (Oxygen Delivery Method): room air        I&O's Summary    29 Apr 2024 07:01  -  30 Apr 2024 07:00  --------------------------------------------------------  IN: 2015.7 mL / OUT: 7658 mL / NET: -5642.3 mL    30 Apr 2024 07:01  -  30 Apr 2024 08:49  --------------------------------------------------------  IN: 328 mL / OUT: 0 mL / NET: 328 mL        ON EXAM:  General: NAD, awake and alert, oriented x 3  HEENT: Mucous membranes are moist, anicteric  Lungs: Non-labored, breath sounds are clear bilaterally, No wheezing, rales or rhonchi.  Decreased bs bl  Cardiovascular: Regular, S1 and S2, 2/6 systolic murmur  Gastrointestinal: Bowel Sounds present, soft, nontender.  Obese  Lymph: b/l chronic  peripheral lymphedema. No lymphadenopathy.  Psych:  Mood & affect appropriate      LABS: All Labs Reviewed:                        7.8    15.35 )-----------( 95       ( 30 Apr 2024 00:31 )             25.3                         7.8    13.41 )-----------( 69       ( 29 Apr 2024 01:04 )             25.5                         7.9    13.46 )-----------( 70       ( 28 Apr 2024 00:50 )             26.9     30 Apr 2024 00:33    131    |  99     |  12     ----------------------------<  225    4.4     |  22     |  1.31   29 Apr 2024 18:41    131    |  99     |  12     ----------------------------<  158    4.1     |  22     |  1.30   29 Apr 2024 12:48    131    |  99     |  11     ----------------------------<  154    4.2     |  22     |  1.30     Ca    7.5        30 Apr 2024 00:33  Ca    7.9        29 Apr 2024 18:41  Ca    8.1        29 Apr 2024 12:48  Phos  1.5       30 Apr 2024 00:33  Phos  1.2       29 Apr 2024 18:41  Phos  1.3       29 Apr 2024 12:48  Mg     2.2       30 Apr 2024 00:33  Mg     2.3       29 Apr 2024 18:41  Mg     2.3       29 Apr 2024 12:48    TPro  5.7    /  Alb  3.0    /  TBili  0.7    /  DBili  x      /  AST  16     /  ALT  12     /  AlkPhos  74     30 Apr 2024 00:33  TPro  6.2    /  Alb  3.0    /  TBili  0.7    /  DBili  x      /  AST  17     /  ALT  13     /  AlkPhos  81     29 Apr 2024 18:41  TPro  6.8    /  Alb  3.3    /  TBili  0.7    /  DBili  x      /  AST  15     /  ALT  13     /  AlkPhos  81     29 Apr 2024 12:48    PT/INR - ( 30 Apr 2024 00:32 )   PT: 11.8 sec;   INR: 1.07 ratio         PTT - ( 30 Apr 2024 00:32 )  PTT:31.0 sec      Blood Culture: Organism --  Gram Stain Blood -- Gram Stain --  Specimen Source .Blood Blood  Culture-Blood --    Organism --  Gram Stain Blood -- Gram Stain --  Specimen Source .Blood Blood  Culture-Blood --    Organism Blood Culture PCR  Gram Stain Blood -- Gram Stain   Growth in aerobic bottle: Gram Positive Cocci in Clusters  Specimen Source .Blood Blood  Culture-Blood --

## 2024-04-30 NOTE — PROGRESS NOTE ADULT - SUBJECTIVE AND OBJECTIVE BOX
[facility]   [phone]  RENAL FOLLOW UP NOTE  --------------------------------------------------------------------------------  Chief Complaint:    24 hour events/subjective:    seen earlier   states breathing is better     PAST HISTORY  --------------------------------------------------------------------------------  No significant changes to PMH, PSH, FHx, SHx, unless otherwise noted    ALLERGIES & MEDICATIONS  --------------------------------------------------------------------------------  Allergies    contrast media (iodine-based) (Fever; Vomiting; Flushing; Hypotension; Rash; Stomach Upset)  Ativan (Rash; Urticaria)  penicillins (Hives (Mod to Severe); Anaphylaxis (Mod to Severe); Short breath (Mod to Severe); Angioedema (Mod to Severe))    Intolerances      Standing Inpatient Medications  ascorbic acid 500 milliGRAM(s) Oral daily  aspirin  chewable 81 milliGRAM(s) Oral daily  atorvastatin 80 milliGRAM(s) Oral at bedtime  calamine/zinc oxide Lotion 1 Application(s) Topical three times a day  chlorhexidine 2% Cloths 1 Application(s) Topical daily  chlorhexidine 4% Liquid 1 Application(s) Topical <User Schedule>  CRRT Treatment    <Continuous>  epoetin mendoza-epbx (RETACRIT) Injectable 35734 Unit(s) IV Push <User Schedule>  ferrous    sulfate 325 milliGRAM(s) Oral two times a day  heparin   Injectable 5000 Unit(s) SubCutaneous every 8 hours  heparin  Infusion Syringe 300 Unit(s)/Hr CRRT <Continuous>  insulin glargine Injectable (LANTUS) 26 Unit(s) SubCutaneous at bedtime  insulin lispro (ADMELOG) corrective regimen sliding scale   SubCutaneous Before meals and at bedtime  insulin lispro Injectable (ADMELOG) 7 Unit(s) SubCutaneous three times a day before meals  levothyroxine 75 MICROGram(s) Oral daily  multivitamin 1 Tablet(s) Oral daily  mupirocin 2% Ointment 1 Application(s) Topical three times a day  nystatin    Suspension 958406 Unit(s) Oral four times a day  petrolatum white Ointment 1 Application(s) Topical three times a day  PrismaSATE Dialysate BGK 4 / 2.5 5000 milliLiter(s) CRRT <Continuous>  PrismaSOL Filtration BGK 4 / 2.5 5000 milliLiter(s) CRRT <Continuous>  PrismaSOL Filtration BGK 4 / 2.5 5000 milliLiter(s) CRRT <Continuous>  senna 2 Tablet(s) Oral at bedtime  vasopressin Infusion 0.02 Unit(s)/Min IV Continuous <Continuous>    PRN Inpatient Medications  polyethylene glycol 3350 17 Gram(s) Oral two times a day PRN      REVIEW OF SYSTEMS  --------------------------------------------------------------------------------    Gen: denies  fevers/chills,  CVS: denies chest pain/palpitations  Resp: denies SOB/Cough  GI: Denies N/V/Abd pain  : Denies dysuria    VITALS/PHYSICAL EXAM  --------------------------------------------------------------------------------  T(C): 36.8 (04-30-24 @ 19:00), Max: 37 (04-30-24 @ 03:00)  HR: 94 (04-30-24 @ 20:15) (77 - 121)  BP: --  RR: 19 (04-30-24 @ 20:15) (7 - 28)  SpO2: 97% (04-30-24 @ 20:15) (89% - 100%)  Wt(kg): --        04-29-24 @ 07:01  -  04-30-24 @ 07:00  --------------------------------------------------------  IN: 2015.7 mL / OUT: 7658 mL / NET: -5642.3 mL    04-30-24 @ 07:01  -  04-30-24 @ 20:28  --------------------------------------------------------  IN: 1934.9 mL / OUT: 1900 mL / NET: 34.9 mL      Physical Exam:  	    Gen:  on RA, facial erythema, neck and arm erythema  	Pulm: decrease bs  no rales or ronchi or wheezing  	CV: No JVD. RRR, S1S2; no rub II/VI M   	Abd: +BS, soft, nontender/nondistended, obese   	UE: Warm, no cyanosis  no clubbing,   +  edema  	LE: Warm, no cyanosis  no clubbing, 4+ edema, B/L LE raised red plaque   	Neuro: alert and oriented               Vascular Access: +  non tunneled HD catheter    LABS/STUDIES  --------------------------------------------------------------------------------              7.8    15.35 >-----------<  95       [04-30-24 @ 00:31]              25.3     132  |  99  |  17  ----------------------------<  265      [04-30-24 @ 12:10]  4.2   |  23  |  1.54        Ca     7.8     [04-30-24 @ 12:10]      Mg     2.3     [04-30-24 @ 12:10]      Phos  3.0     [04-30-24 @ 12:10]    TPro  6.1  /  Alb  2.9  /  TBili  0.7  /  DBili  x   /  AST  16  /  ALT  10  /  AlkPhos  76  [04-30-24 @ 12:10]    PT/INR: PT 11.8 , INR 1.07       [04-30-24 @ 00:32]  PTT: 31.0       [04-30-24 @ 00:32]      Creatinine Trend:  SCr 1.54 [04-30 @ 12:10]  SCr 1.31 [04-30 @ 00:33]  SCr 1.30 [04-29 @ 18:41]  SCr 1.30 [04-29 @ 12:48]  SCr 1.29 [04-29 @ 06:26]              PTH -- (Ca 8.4)      [04-19-24 @ 17:54]   109  TSH 5.06      [04-14-24 @ 07:18]  Lipid: chol 74, TG 70, HDL 33, LDL --      [04-13-24 @ 07:05]

## 2024-04-30 NOTE — PROGRESS NOTE ADULT - ASSESSMENT
Assessment:  73F PMH: HFrEF (EF 30%), AS, LBBB, HTN, DM1, CKD, hypothyroidism, chronic lymphedema, LELIA (3L home O2) p/w for syncope 2/2 severe AS, s/p TAVR 4/24. Course complicated by contrast induced allergic reaction (had CTA for TAVR eval) and contrast related renal failure (acute on chronic) requiring HD. TAVR c/b VT and vfib requiring shock and flash pulmonary edema causing AHRF requiring intubation. Currently on HD for fluid overload, on pressor support for distributive septic shock.    NEURO:  - Extubated 4/26  - Currently AOx4  - OOBTC    PULM  # AHRF  2/2 flash pulmonary edema  # LELIA (on 3L home oxygen)  - Extubated 4/26  - 4/27 CXR - no consolidation or pleural effusion  - intermittently requiring nasal cannula    CV  #Shock state  -  vasoplegia from sedation vs losing fluid from CVVHD vs distributive septic shock  - on vasopressin 0.05  - lactate cleared  - dc vancomycin , completed 5 days  - ID following    #VT/vfib s/p shock  #2:1 AV block  #Severe AS s/p TAVR  - Known LBBB with first degree AV block on telemetry  - s/p Micra 4/25, no longer requiring TVP (eventual candidate for CRT per EP)  - c/w ASA, atorvastatin     # HFrEF  - EF 25%  - Holding GDMT while on pressors    /RENAL  #ESRD  - from ATN and contrast nephropathy  - CRRT, increase rate to 275 cc/hr given persistent volume overload for one more day and switch to intermittent HD  - pressor support PRN for volume removal   - Strict I/Os  - Trend BMP, lytes  - Avoid nephrotoxins    GI  - resumed diet    ENDO  # DM1  # DKA    - on  basal/bolus lantus 24 QHS, admelog dec to 5u TIDAC  - monitor POCT FS, goal 140-180    # Hypothyroidism  - c/w home levothyroxine     #Cushing  # L adrenal nodule  -dexamethasone suppression test w/ am cortisol - not suppressed  -repeat 24hr urine cortisol once not on CRRT  -f/u MN salivary cortisol, dexamethasone level    SKIN  # Acute generalized exanthematous pustulosis   - c/w calamine,  Vaseline    # Psoriasis   # Lymphedema   # R. Diabetic foot ulcer  - Elevate legs  - Compression stockings, ACE wrapping  - Podiatry following- foot wounds management, decubitis precuations, f/u outpt, check for iodine gel allergy    ID  - Monitor WBC and for fevers  - pos for staph E. bacteremia (likely contaminant 1 out of 4 bottles)  - dc Vancomycin , 5 days completed  - ID following    # Oral Candidiasis  - c/w nystatin     Heme  #Anemia  -AARTI vs AOCD vs anemia 2/2 blood loss vs myelosuppression iso critical illness  -trend H&H, transfuse PRN    #thrombocytopenia  -  myelosuppression iso critical illness  - no active signs of bleeding, monitor plts, transfuse PRN      Mobilize out of bed once off CRRT and pressors.     Patient requires continuous monitoring with bedside rhythm monitoring, pulse ox monitoring, and intermittent blood gas analysis. Care plan discussed with ICU care team. Patient remained critical and at risk for life threatening decompensation.  Patient seen, examined and plan discussed with CCU team during rounds.    Assessment:  73F PMH: HFrEF (EF 30%), AS, LBBB, HTN, DM1, CKD, hypothyroidism, chronic lymphedema, LELIA (3L home O2) p/w for syncope 2/2 severe AS, s/p TAVR 4/24. Course complicated by contrast induced allergic reaction (had CTA for TAVR eval) and contrast related renal failure (acute on chronic) requiring HD. TAVR c/b VT and vfib requiring shock and flash pulmonary edema causing AHRF requiring intubation. Currently on CVVPHD for fluid overload, on pressor support for distributive septic shock.    NEURO:  - Extubated 4/26  - Currently AOx4  - OOBTC, c/w PT as tolerated    PULM  # AHRF  2/2 flash pulmonary edema  # LELIA (on 3L home oxygen)  - Extubated 4/26  - 4/27 CXR - no consolidation or pleural effusion  - intermittently requiring nasal cannula for comfort    CV  #Shock state  -  vasoplegia from sedation vs losing fluid from CVVHD vs distributive septic shock  - on vasopressin 0.05  - lactate cleared  - dc vancomycin , completed 5 days  - ID following- Bx neg  - f/u US for IVC collapsibility    #VT/vfib s/p shock  #2:1 AV block  #Severe AS s/p TAVR  - Known LBBB with first degree AV block on telemetry  - s/p Micra 4/25, no longer requiring TVP (eventual candidate for CRT per EP)  - c/w ASA, atorvastatin     # HFrEF  - EF 25%  - Holding GDMT while on pressors    /RENAL  #ESRD  - from ATN and contrast nephropathy  - c/w CRRT, increase rate to 275 cc/hr given persistent volume overload for one more day and switch to intermittent HD  - pressor support PRN for volume removal   - Strict I/Os  - Trend BMP, lytes  - Avoid nephrotoxins  - Nephro following  - f/u bladder scan after CVVHD has stopped    GI  - tolerating  diet    ENDO  # DM1  # DKA    - on  basal/bolus lantus 25QHS, admelog dec to 5u TIDAC   - monitor POCT FS, goal 140-180    # Hypothyroidism  - c/w home levothyroxine     #Cushing  # L adrenal nodule  -dexamethasone suppression test w/ am cortisol - not suppressed  -repeat 24hr urine cortisol once not on CRRT, previous 1.2  -f/u MN salivary cortisol, dexamethasone level 374    SKIN  # Acute generalized exanthematous pustulosis   - c/w calamine,  Vaseline    # Psoriasis   # Lymphedema   # R. Diabetic foot ulcer  - Elevate legs  - Compression stockings, ACE wrapping  - Podiatry following- foot wounds management, decubiti precautions f/u outpt, check for iodine gel allergy    ID  - Monitor WBC and for fevers  - pos for staph E. bacteremia (likely contaminant 1 out of 4 bottles)  - dc Vancomycin , 5 days completed  - ID following    # Oral Candidiasis  - c/w nystatin     Heme  #Anemia  -AARTI vs AOCD vs anemia 2/2 blood loss vs myelosuppression iso critical illness  -trend H&H, transfuse PRN    #thrombocytopenia  -  myelosuppression iso critical illness  - no active signs of bleeding, monitor plts, transfuse PRN      Mobilize out of bed once off CRRT and pressors.     Patient requires continuous monitoring with bedside rhythm monitoring, pulse ox monitoring, and intermittent blood gas analysis. Care plan discussed with ICU care team. Patient remained critical and at risk for life threatening decompensation.  Patient seen, examined and plan discussed with CCU team during rounds.

## 2024-04-30 NOTE — PROGRESS NOTE ADULT - SUBJECTIVE AND OBJECTIVE BOX
EVELYN LOPEZ  MRN-[id number]  Patient is a 73y old  Female who presents with a chief complaint of Syncope (30 Apr 2024 14:41)    HPI:  73F w HFrEF (EF 30%), mod AS, known LBBB, HTN, IDDM1, CKD, hypothyroidism, chronic lymphedema, suspected OHS/LELIA on 3L NC home O2 p/w 1 episode of syncope. Recent admission at [facility] (3/29-4/5) for ADHF and DUNCAN s/p diuresis, discharged with new home O2 3L NC suspecting OHS/LELIA pending outpatient w/u. Since discharge a week ago, she has been staying at home with   Pt had sob walking to car. Once in car, she was still breathing heavily. Partner noticed pt was not responding to verbal stimuli for 10-15sec and witnessed R arm jerking. Pt gained consciousness quickly without postictal symptoms. Pt went to Cardiologist Dr. Nathan who recommended pt to come to ED. No fever, cp, abd pain, n/v. Leg swelling is improved from prior. Pt on torsemide 40mg which she has been taking. Pt was discharged on 3LNC which she is currently on. Patient reports she did not take Farxiga that she was discharged on as she was concerned about side effects.     In ED, patient was found afebrile, hemodynamically stable, breathing well on 3L NC. Initial labs notable for mild hyponatremia, DUNCAN on CKD, elevated proBNP and elevated pCO2 both improved from prior. (12 Apr 2024 16:31)      Hospital Course:    24 HOUR EVENTS:    REVIEW OF SYSTEMS:    CONSTITUTIONAL: No weakness, fevers or chills  EYES/ENT: No visual changes;  No vertigo or throat pain   NECK: No pain or stiffness  RESPIRATORY: No cough, wheezing, hemoptysis; No shortness of breath  CARDIOVASCULAR: No chest pain or palpitations  GASTROINTESTINAL: No abdominal or epigastric pain. No nausea, vomiting, or hematemesis; No diarrhea or constipation. No melena or hematochezia.  GENITOURINARY: No dysuria, frequency or hematuria  NEUROLOGICAL: No numbness or weakness  SKIN: No itching, rashes      ICU Vital Signs Last 24 Hrs  T(C): 36.8 (30 Apr 2024 19:00), Max: 37 (30 Apr 2024 03:00)  T(F): 98.2 (30 Apr 2024 19:00), Max: 98.6 (30 Apr 2024 03:00)  HR: 84 (30 Apr 2024 19:30) (77 - 121)  BP: --  BP(mean): --  ABP: 103/34 (30 Apr 2024 19:30) (73/30 - 125/55)  ABP(mean): 56 (30 Apr 2024 19:30) (44 - 82)  RR: 23 (30 Apr 2024 19:30) (7 - 28)  SpO2: 100% (30 Apr 2024 19:30) (89% - 100%)    O2 Parameters below as of 30 Apr 2024 19:00  Patient On (Oxygen Delivery Method): room air            CVP(mm Hg): --  CO: --  CI: --  PA: --  PA(mean): --  PA(direct): --  PCWP: --  LA: --  RA: --  SVR: --  SVRI: --  PVR: --  PVRI: --  I&O's Summary    29 Apr 2024 07:01  -  30 Apr 2024 07:00  --------------------------------------------------------  IN: 2015.7 mL / OUT: 7658 mL / NET: -5642.3 mL    30 Apr 2024 07:01  -  30 Apr 2024 19:58  --------------------------------------------------------  IN: 1930.4 mL / OUT: 1570 mL / NET: 360.4 mL        CAPILLARY BLOOD GLUCOSE    CAPILLARY BLOOD GLUCOSE      POCT Blood Glucose.: 299 mg/dL (30 Apr 2024 16:51)      PHYSICAL EXAM:  GENERAL: No acute distress, well-developed  HEAD:  Atraumatic, Normocephalic  EYES: EOMI, PERRLA, conjunctiva and sclera clear  NECK: Supple, no lymphadenopathy, no JVD  CHEST/LUNG: CTAB; No wheezes, rales, or rhonchi  HEART: Regular rate and rhythm. Normal S1/S2. No murmurs, rubs, or gallops  ABDOMEN: Soft, non-tender, non-distended; normal bowel sounds, no organomegaly  EXTREMITIES:  2+ peripheral pulses b/l, No clubbing, cyanosis, or edema  NEUROLOGY: A&O x 3, no focal deficits  SKIN: No rashes or lesions    ============================I/O===========================   I&O's Detail    29 Apr 2024 07:01  -  30 Apr 2024 07:00  --------------------------------------------------------  IN:    IV PiggyBack: 425 mL    Oral Fluid: 1500 mL    Vasopressin: 90.7 mL  Total IN: 2015.7 mL    OUT:    Other (mL): 7658 mL  Total OUT: 7658 mL    Total NET: -5642.3 mL      30 Apr 2024 07:01  -  30 Apr 2024 19:58  --------------------------------------------------------  IN:    IV PiggyBack: 668.1 mL    Oral Fluid: 1200 mL    Vasopressin: 62.3 mL  Total IN: 1930.4 mL    OUT:    Intermittent Catheterization - Urethral (mL): 550 mL    Other (mL): 1020 mL  Total OUT: 1570 mL    Total NET: 360.4 mL        ============================ LABS =========================                        7.8    15.35 )-----------( 95       ( 30 Apr 2024 00:31 )             25.3     04-30    132<L>  |  99  |  17  ----------------------------<  265<H>  4.2   |  23  |  1.54<H>    Ca    7.8<L>      30 Apr 2024 12:10  Phos  3.0     04-30  Mg     2.3     04-30    TPro  6.1  /  Alb  2.9<L>  /  TBili  0.7  /  DBili  x   /  AST  16  /  ALT  10  /  AlkPhos  76  04-30                LIVER FUNCTIONS - ( 30 Apr 2024 12:10 )  Alb: 2.9 g/dL / Pro: 6.1 g/dL / ALK PHOS: 76 U/L / ALT: 10 U/L / AST: 16 U/L / GGT: x           PT/INR - ( 30 Apr 2024 00:32 )   PT: 11.8 sec;   INR: 1.07 ratio         PTT - ( 30 Apr 2024 00:32 )  PTT:31.0 sec  ABG - ( 30 Apr 2024 00:25 )  pH, Arterial: 7.37  pH, Blood: x     /  pCO2: 41    /  pO2: 147   / HCO3: 24    / Base Excess: -1.5  /  SaO2: 99.2              Blood Gas Arterial, Lactate: 0.9 mmol/L (04-30-24 @ 00:25)  Blood Gas Arterial, Lactate: 0.9 mmol/L (04-29-24 @ 00:44)  Blood Gas Arterial, Lactate: 1.1 mmol/L (04-28-24 @ 00:47)    Urinalysis Basic - ( 30 Apr 2024 12:10 )    Color: x / Appearance: x / SG: x / pH: x  Gluc: 265 mg/dL / Ketone: x  / Bili: x / Urobili: x   Blood: x / Protein: x / Nitrite: x   Leuk Esterase: x / RBC: x / WBC x   Sq Epi: x / Non Sq Epi: x / Bacteria: x      ======================Micro/Rad/Cardio=================  Telemtry: Reviewed   EKG: Reviewed  CXR: Reviewed  Culture: Reviewed   Echo:   Cath:   ======================================================  PAST MEDICAL & SURGICAL HISTORY:  Hypertension      Diabetes      Lymphedema      H/O left bundle branch block      History of left bundle branch block (LBBB)      Systolic heart failure, chronic      Type 1 diabetes      H/O cataract  2020      History of surgical removal of meniscus of knee  left in 1971      Frozen shoulder  2000      H/O Achilles tendon repair  lengthened bilaterally, 2000      Fractured skull  1968  ====================ASSESSMENT ==============  73F PMH: HFrEF (EF 30%), AS, LBBB, HTN, DM1, CKD, hypothyroidism, chronic lymphedema, LELIA (3L home O2) p/w for syncope 2/2 severe AS, s/p TAVR 4/24. Course complicated by contrast induced allergic reaction (had CTA for TAVR eval) and contrast related renal failure (acute on chronic) requiring HD. TAVR c/b VT and vfib requiring shock and flash pulmonary edema causing AHRF requiring intubation. Currently on CVVPHD for fluid overload, on pressor support for distributive septic shock.    NEURO:  - Extubated 4/26  - Currently AOx4  - OOBTC, c/w PT as tolerated    PULM  # AHRF  2/2 flash pulmonary edema  # LELIA (on 3L home oxygen)  - Extubated 4/26  - 4/27 CXR - no consolidation or pleural effusion  - intermittently requiring nasal cannula for comfort    CV  #Shock state  -  vasoplegia from sedation vs losing fluid from CVVHD vs distributive septic shock  - on vasopressin 0.05  - lactate cleared  - dc vancomycin , completed 5 days  - ID following- Bx neg  - f/u US for IVC collapsibility    #VT/vfib s/p shock  #2:1 AV block  #Severe AS s/p TAVR  - Known LBBB with first degree AV block on telemetry  - s/p Micra 4/25, no longer requiring TVP (eventual candidate for CRT per EP)  - c/w ASA, atorvastatin     # HFrEF  - EF 25%  - Holding GDMT while on pressors    /RENAL  #ESRD  - from ATN and contrast nephropathy  - c/w CRRT, increase rate to 275 cc/hr given persistent volume overload for one more day and switch to intermittent HD  - pressor support PRN for volume removal   - Strict I/Os  - Trend BMP, lytes  - Avoid nephrotoxins  - Nephro following  - f/u bladder scan after CVVHD has stopped    GI  - tolerating  diet    ENDO  # DM1  # DKA    - on  basal/bolus lantus 25QHS, admelog dec to 5u TIDAC   - monitor POCT FS, goal 140-180    # Hypothyroidism  - c/w home levothyroxine     #Cushing  # L adrenal nodule  -dexamethasone suppression test w/ am cortisol - not suppressed  -repeat 24hr urine cortisol once not on CRRT, previous 1.2  -f/u MN salivary cortisol, dexamethasone level 374    SKIN  # Acute generalized exanthematous pustulosis   - c/w calamine,  Vaseline    # Psoriasis   # Lymphedema   # R. Diabetic foot ulcer  - Elevate legs  - Compression stockings, ACE wrapping  - Podiatry following- foot wounds management, decubiti precautions f/u outpt, check for iodine gel allergy    ID  - Monitor WBC and for fevers  - pos for staph E. bacteremia (likely contaminant 1 out of 4 bottles)  - dc Vancomycin , 5 days completed  - ID following    # Oral Candidiasis  - c/w nystatin     Heme  #Anemia  -AARTI vs AOCD vs anemia 2/2 blood loss vs myelosuppression iso critical illness  -trend H&H, transfuse PRN    #thrombocytopenia  -  myelosuppression iso critical illness  - no active signs of bleeding, monitor plts, transfuse PRN      Patient requires continuous monitoring with bedside rhythm monitoring, pulse ox monitoring, and intermittent blood gas analysis. Care plan discussed with ICU care team. Patient remained critical and at risk for life threatening decompensation.  Patient seen, examined and plan discussed with CCU team during rounds.     I have personally provided ____ minutes of critical care time excluding time spent on separate procedures, in addition to initial critical care time provided by the CICU Attending, Dr. Sandoval.     By signing my name below, I, Susan Lockett, attest that this documentation has been prepared under the direction and in the presence of TABATHA Graham.   Electronically signed: Madison Pires, 04-30-24 @ 19:58    Georgia FANG , personally performed the services described in this documentation. all medical record entries made by the heraclioibe were at my direction and in my presence. I have reviewed the chart and agree that the record reflects my personal performance and is accurate and complete  Electronically signed: TABATHA Graham.        EVELYN LOPEZ  MRN-[id number]  Patient is a 73y old  Female who presents with a chief complaint of Syncope (30 Apr 2024 14:41).    HPI:  73F w HFrEF (EF 30%), mod AS, known LBBB, HTN, IDDM1, CKD, hypothyroidism, chronic lymphedema, suspected OHS/LELIA on 3L NC home O2 p/w 1 episode of syncope. Recent admission at [facility] (3/29-4/5) for ADHF and DUNCAN s/p diuresis, discharged with new home O2 3L NC suspecting OHS/LELIA pending outpatient w/u. Since discharge a week ago, she has been staying at home with   Pt had sob walking to car. Once in car, she was still breathing heavily. Partner noticed pt was not responding to verbal stimuli for 10-15sec and witnessed R arm jerking. Pt gained consciousness quickly without postictal symptoms. Pt went to Cardiologist Dr. Nathan who recommended pt to come to ED. No fever, cp, abd pain, n/v. Leg swelling is improved from prior. Pt on torsemide 40mg which she has been taking. Pt was discharged on 3LNC which she is currently on. Patient reports she did not take Farxiga that she was discharged on as she was concerned about side effects.   In ED, patient was found afebrile, hemodynamically stable, breathing well on 3L NC. Initial labs notable for mild hyponatremia, DUNCAN on CKD, elevated proBNP and elevated pCO2 both improved from prior. (12 Apr 2024 16:31)    24 HOUR EVENTS:  - CVVH -180cc/hr fluid removal   - remains on Vaso .03    ICU Vital Signs Last 24 Hrs  T(C): 36.8 (30 Apr 2024 19:00), Max: 37 (30 Apr 2024 03:00)  T(F): 98.2 (30 Apr 2024 19:00), Max: 98.6 (30 Apr 2024 03:00)  HR: 84 (30 Apr 2024 19:30) (77 - 121)  ABP: 103/34 (30 Apr 2024 19:30) (73/30 - 125/55)  ABP(mean): 56 (30 Apr 2024 19:30) (44 - 82)  RR: 23 (30 Apr 2024 19:30) (7 - 28)  SpO2: 100% (30 Apr 2024 19:30) (89% - 100%)    O2 Parameters below as of 30 Apr 2024 19:00  Patient On (Oxygen Delivery Method): room air    I&O's Summary  29 Apr 2024 07:01  -  30 Apr 2024 07:00  --------------------------------------------------------  IN: 2015.7 mL / OUT: 7658 mL / NET: -5642.3 mL    30 Apr 2024 07:01  -  30 Apr 2024 19:58  --------------------------------------------------------  IN: 1930.4 mL / OUT: 1570 mL / NET: 360.4 mL    CAPILLARY BLOOD GLUCOSE  POCT Blood Glucose.: 299 mg/dL (30 Apr 2024 16:51)    PHYSICAL EXAM:  GENERAL: NAD  CHEST/LUNG: CTAB; No wheezes, rales, or rhonchi  HEART: Regular rate and rhythm. Normal S1/S2  ABDOMEN: Soft, non-tender, non-distended  EXTREMITIES:  2+ peripheral pulses b/l  NEUROLOGY: A&O x 3, no focal deficits    ============================I/O===========================   I&O's Detail    29 Apr 2024 07:01  -  30 Apr 2024 07:00  --------------------------------------------------------  IN:    IV PiggyBack: 425 mL    Oral Fluid: 1500 mL    Vasopressin: 90.7 mL  Total IN: 2015.7 mL    OUT:    Other (mL): 7658 mL  Total OUT: 7658 mL    Total NET: -5642.3 mL      30 Apr 2024 07:01  -  30 Apr 2024 19:58  --------------------------------------------------------  IN:    IV PiggyBack: 668.1 mL    Oral Fluid: 1200 mL    Vasopressin: 62.3 mL  Total IN: 1930.4 mL    OUT:    Intermittent Catheterization - Urethral (mL): 550 mL    Other (mL): 1020 mL  Total OUT: 1570 mL    Total NET: 360.4 mL    ============================ LABS =========================                 7.8    15.35 )-----------( 95       ( 30 Apr 2024 00:31 )             25.3     04-30    132<L>  |  99  |  17  ----------------------------<  265<H>  4.2   |  23  |  1.54<H>    Ca    7.8<L>      30 Apr 2024 12:10  Phos  3.0     04-30  Mg     2.3     04-30    TPro  6.1  /  Alb  2.9<L>  /  TBili  0.7  /  DBili  x   /  AST  16  /  ALT  10  /  AlkPhos  76  04-30    LIVER FUNCTIONS - ( 30 Apr 2024 12:10 )  Alb: 2.9 g/dL / Pro: 6.1 g/dL / ALK PHOS: 76 U/L / ALT: 10 U/L / AST: 16 U/L / GGT: x           PT/INR - ( 30 Apr 2024 00:32 )   PT: 11.8 sec;   INR: 1.07 ratio       PTT - ( 30 Apr 2024 00:32 )  PTT:31.0 sec  ABG - ( 30 Apr 2024 00:25 )  pH, Arterial: 7.37  pH, Blood: x     /  pCO2: 41    /  pO2: 147   / HCO3: 24    / Base Excess: -1.5  /  SaO2: 99.2      Blood Gas Arterial, Lactate: 0.9 mmol/L (04-30-24 @ 00:25)  Blood Gas Arterial, Lactate: 0.9 mmol/L (04-29-24 @ 00:44)  Blood Gas Arterial, Lactate: 1.1 mmol/L (04-28-24 @ 00:47)    Urinalysis Basic - ( 30 Apr 2024 12:10 )    Color: x / Appearance: x / SG: x / pH: x  Gluc: 265 mg/dL / Ketone: x  / Bili: x / Urobili: x   Blood: x / Protein: x / Nitrite: x   Leuk Esterase: x / RBC: x / WBC x   Sq Epi: x / Non Sq Epi: x / Bacteria: x    ======================================================  PAST MEDICAL & SURGICAL HISTORY:  Hypertension    Diabetes    Lymphedema    H/O left bundle branch block    History of left bundle branch block (LBBB)    Systolic heart failure, chronic    Type 1 diabetes    H/O cataract 2020    History of surgical removal of meniscus of knee, left in 1971    Frozen shoulder, 2000    H/O Achilles tendon repair  lengthened bilaterally, 2000    Fractured skull, 1968    ====================ASSESSMENT ==============  73F PMH: HFrEF (EF 30%), AS, LBBB, HTN, DM1, CKD, hypothyroidism, chronic lymphedema, LELIA (3L home O2) p/w for syncope 2/2 severe AS, s/p TAVR 4/24. Course complicated by contrast induced allergic reaction (had CTA for TAVR eval) and contrast related renal failure (acute on chronic) requiring HD. TAVR c/b VT and vfib requiring shock and flash pulmonary edema causing AHRF requiring intubation. Currently on CVVPHD for fluid overload, on pressor support for distributive septic shock.    NEURO:  - Extubated 4/26  - Currently AOx4  - OOBTC, c/w PT as tolerated    PULM  # AHRF  2/2 flash pulmonary edema  # LELIA (on 3L home oxygen)  - Extubated 4/26  - 4/27 CXR - no consolidation or pleural effusion  - intermittently requiring nasal cannula for comfort    CV  #Shock state  -  vasoplegia from sedation vs losing fluid from CVVHD vs distributive septic shock  - on vasopressin 0.03  - lactate cleared  - dc vancomycin , completed 5 days  - ID following- BCx neg  - f/u US for IVC collapsibility    #VT/vfib s/p shock  #2:1 AV block  #Severe AS s/p TAVR  - Known LBBB with first degree AV block on telemetry  - s/p Micra 4/25, no longer requiring TVP (eventual candidate for CRT per EP)  - c/w ASA, atorvastatin     # HFrEF  - EF 25%  - Holding GDMT while on pressors  - Wean Vaso as BP tolerates    /RENAL  #ESRD  - from ATN and contrast nephropathy  - c/w CRRT, increase rate to 275 cc/hr given persistent volume overload for one more day and switch to intermittent HD  - pressor support PRN for volume removal   - Strict I/Os  - Trend BMP, lytes  - Avoid nephrotoxins  - Nephro following  - f/u bladder scan after CVVHD has stopped    GI  - tolerating  diet    ENDO  # DM1  # DKA    - on  basal/bolus lantus 25QHS, admelog dec to 5u TIDAC   - monitor POCT FS, goal 140-180    # Hypothyroidism  - c/w home levothyroxine     #Cushing  # L adrenal nodule  -dexamethasone suppression test w/ am cortisol - not suppressed  -repeat 24hr urine cortisol once not on CRRT, previous 1.2  -f/u MN salivary cortisol, dexamethasone level 374    SKIN  # Acute generalized exanthematous pustulosis   - c/w calamine,  Vaseline    # Psoriasis   # Lymphedema   # R. Diabetic foot ulcer  - Elevate legs  - Compression stockings, ACE wrapping  - Podiatry following- foot wounds management, decubiti precautions f/u outpt, check for iodine gel allergy    ID  - Monitor WBC and for fevers  - pos for staph E. bacteremia (likely contaminant 1 out of 4 bottles)  - dc Vancomycin , 5 days completed  - ID following    # Oral Candidiasis  - c/w nystatin     Heme  #Anemia  -AARTI vs AOCD vs anemia 2/2 blood loss vs myelosuppression iso critical illness  -trend H&H, transfuse PRN    #thrombocytopenia  -  myelosuppression iso critical illness  - no active signs of bleeding, monitor plts, transfuse PRN    Patient requires continuous monitoring with bedside rhythm monitoring, pulse ox monitoring, and intermittent blood gas analysis. Care plan discussed with ICU care team. Patient remained critical and at risk for life threatening decompensation.  Patient seen, examined and plan discussed with CCU team during rounds.     I have personally provided 35 minutes of critical care time excluding time spent on separate procedures, in addition to initial critical care time provided by the CICU Attending, Dr. Sandoval.     By signing my name below, I, Susan Lockett, attest that this documentation has been prepared under the direction and in the presence of TABATHA Graham.   Electronically signed: Madison Pires, 04-30-24 @ 19:58    I, Georgia Doshi , personally performed the services described in this documentation. all medical record entries made by the heraclioibe were at my direction and in my presence. I have reviewed the chart and agree that the record reflects my personal performance and is accurate and complete  Electronically signed: TABATHA Graham.

## 2024-04-30 NOTE — PROGRESS NOTE ADULT - ASSESSMENT
Impression:    73F PMH: HFrEF (EF 30%), AS, LBBB, HTN, DM1, CKD, hypothyroidism, chronic lymphedema, LELIA (3L home O2) p/w for syncope 2/2 severe AS, s/p TAVR 4/24. Course complicated by contrast induced allergic reaction (had CTA for TAVR eval) and contrast related renal failure (acute on chronic) requiring HD. TAVR c/b VT and vfib requiring shock and flash pulmonary edema causing AHRF requiring intubation. Currently on CVVPHD for fluid overload, on pressor support for distributive septic shock.    Bilateral lower leg mixed vascular wounds  Venous Stasis Dermatitis  Lymphedema  Psoriasis    Recommend:  1.) topical therapy: Right and left lower leg wounds - cleanse with NS, pat dry, medihoney, cover with DSD, secure with wilfrido, then ace wraps daily  2.) Bilateral lower extremity elevation  3.) Repeat RENEE/PVR  4.) May benefit from compression - pending repeat RENEE/PVR results  5.) Nutrition optimization - please add Kwesi  6.) Offload heels/feet with complete cair air fluidized boots; ensure that the soles of the feet are not resting on the foot board of the bed.  7.) Bilateral foot wounds per Podiatry  8.) psoriasis per Dermatology  9.) Glycemic control    Care as per medicine. Will not actively follow but will remain available. Please recall for new issues or deterioration.  Upon [facility] [address] [place] [address] [phone]  Thank you for this consult  Seen and discussed with clinical nurse, primary team Dr. Du Uribe, NP-C, CWOCN via TEAMS Impression:    73F PMH: HFrEF (EF 30%), AS, LBBB, HTN, DM1, CKD, hypothyroidism, chronic lymphedema, LELIA (3L home O2) p/w for syncope 2/2 severe AS, s/p TAVR 4/24. Course complicated by contrast induced allergic reaction (had CTA for TAVR eval) and contrast related renal failure (acute on chronic) requiring HD. TAVR c/b VT and vfib requiring shock and flash pulmonary edema causing AHRF requiring intubation. Currently on CVVPHD for fluid overload, on pressor support for distributive septic shock.    Bilateral lower leg mixed vascular wounds  PVD  Venous Stasis Dermatitis  Lymphedema  Psoriasis    Recommend:  1.) topical therapy: Right and left lower leg wounds - cleanse with NS, pat dry, medihoney, cover with DSD, secure with wilfrido, then ace wraps daily  2.) Bilateral lower extremity elevation  3.) Recommend repeat RENEE/PVR given non palpable pulses and previous RENEE/PVR results from 8/2023  4.) May benefit from multilayer compression - pending repeat RENEE/PVR results  5.) Nutrition optimization - please add Kwesi  6.) Offload heels/feet with complete cair air fluidized boots; ensure that the soles of the feet are not resting on the foot board of the bed.  7.) Bilateral foot wounds per Podiatry  8.) psoriasis per Dermatology  9.) Glycemic control    Care as per medicine. Will not actively follow but will remain available. Please recall for new issues or deterioration.  Upon discharge f/u as outpatient at [facility] [address] [place] [address] [phone]  Thank you for this consult  Seen and discussed with clinical nurse, primary team Dr. Du Uribe, NP-C, CWOCN via TEAMS

## 2024-04-30 NOTE — PROGRESS NOTE ADULT - SUBJECTIVE AND OBJECTIVE BOX
Chief Complaint/Follow-up on: Diabetes    Subjective: Feels well, good appetite.   Had Persian toast this AM, hyperglycemic >250. Also has fasting hyperglycemia.       ROS:   GENERAL: Denies pain, has good appetite  GI: No nausea/vomiting  RESPI: No cough, shortness of breath  CV: No chest pain, palpitations     MEDICATIONS  (STANDING):  ascorbic acid 500 milliGRAM(s) Oral daily  aspirin  chewable 81 milliGRAM(s) Oral daily  atorvastatin 80 milliGRAM(s) Oral at bedtime  calamine/zinc oxide Lotion 1 Application(s) Topical three times a day  chlorhexidine 2% Cloths 1 Application(s) Topical daily  chlorhexidine 4% Liquid 1 Application(s) Topical <User Schedule>  CRRT Treatment    <Continuous>  epoetin mendoza-epbx (RETACRIT) Injectable 52486 Unit(s) IV Push <User Schedule>  ferrous    sulfate 325 milliGRAM(s) Oral two times a day  heparin   Injectable 5000 Unit(s) SubCutaneous every 8 hours  heparin  Infusion Syringe 300 Unit(s)/Hr (0.6 mL/Hr) CRRT <Continuous>  insulin glargine Injectable (LANTUS) 26 Unit(s) SubCutaneous at bedtime  insulin lispro (ADMELOG) corrective regimen sliding scale   SubCutaneous Before meals and at bedtime  insulin lispro Injectable (ADMELOG) 7 Unit(s) SubCutaneous three times a day before meals  levothyroxine 75 MICROGram(s) Oral daily  multivitamin 1 Tablet(s) Oral daily  mupirocin 2% Ointment 1 Application(s) Topical three times a day  nystatin    Suspension 229407 Unit(s) Oral four times a day  petrolatum white Ointment 1 Application(s) Topical three times a day  PrismaSATE Dialysate BGK 4 / 2.5 5000 milliLiter(s) (1400 mL/Hr) CRRT <Continuous>  PrismaSOL Filtration BGK 4 / 2.5 5000 milliLiter(s) (300 mL/Hr) CRRT <Continuous>  PrismaSOL Filtration BGK 4 / 2.5 5000 milliLiter(s) (1000 mL/Hr) CRRT <Continuous>  senna 2 Tablet(s) Oral at bedtime  vasopressin Infusion 0.02 Unit(s)/Min (3 mL/Hr) IV Continuous <Continuous>    MEDICATIONS  (PRN):  polyethylene glycol 3350 17 Gram(s) Oral two times a day PRN Constipation      PHYSICAL EXAM:  VITALS: T(C): 36.9 (04-30-24 @ 08:00)  T(F): 98.5 (04-30-24 @ 08:00), Max: 98.7 (04-29-24 @ 19:00)  HR: 98 (04-30-24 @ 13:30) (77 - 107)  BP: --  RR:  (7 - 28)  SpO2:  (93% - 100%)  Wt(kg): --  GENERAL: NAD, well-groomed, well-developed  EYES: No proptosis, no injection  HEENT:  Atraumatic, Normocephalic, moist mucous membranes  RESPIRATORY: No respiratory distress, normal chest expansion   PSYCH: normal mood, normal affect  NEURO: alert, oriented, normal speech     POCT Blood Glucose.: 268 mg/dL (04-30-24 @ 11:43)  POCT Blood Glucose.: 284 mg/dL (04-30-24 @ 07:44)  POCT Blood Glucose.: 196 mg/dL (04-29-24 @ 22:20)  POCT Blood Glucose.: 171 mg/dL (04-29-24 @ 16:37)  POCT Blood Glucose.: 143 mg/dL (04-29-24 @ 11:34)  POCT Blood Glucose.: 205 mg/dL (04-29-24 @ 07:44)  POCT Blood Glucose.: 172 mg/dL (04-28-24 @ 21:12)  POCT Blood Glucose.: 171 mg/dL (04-28-24 @ 16:25)  POCT Blood Glucose.: 212 mg/dL (04-28-24 @ 12:05)  POCT Blood Glucose.: 237 mg/dL (04-28-24 @ 07:52)  POCT Blood Glucose.: 131 mg/dL (04-27-24 @ 21:47)  POCT Blood Glucose.: 94 mg/dL (04-27-24 @ 16:43)        04-30    132<L>  |  99  |  17  ----------------------------<  265<H>  4.2   |  23  |  1.54<H>    eGFR: 36<L>    Ca    7.8<L>      04-30  Mg     2.3     04-30  Phos  3.0     04-30    TPro  6.1  /  Alb  2.9<L>  /  TBili  0.7  /  DBili  x   /  AST  16  /  ALT  10  /  AlkPhos  76  04-30 04-14 @ 07:18 TSH 5.06 FreeT4 1.3 T3 -- Anti TPO -- Anti Thyroglobulin Ab -- TSI --  04-13 @ 07:05 TSH 4.79 FreeT4 1.5 T3 -- Anti TPO -- Anti Thyroglobulin Ab -- TSI --

## 2024-05-01 NOTE — PROGRESS NOTE ADULT - ASSESSMENT
73F w HFrEF (EF 30%), mod AS, known LBBB, HTN, T1DM, CKD, hypothyroidism, chronic lymphedema, suspected OHS/LELIA on 3L NC home O2 p/w 1 episode of syncope with R arm jerking, likely 2/2 severe symptomatic AS. CTA imagings performed for TAVR eval, c/b DUNCAN on CKD. C/b febrile and hypotension, sepsis workup pending. CT c/a/p w/w/o IV contrast revealed incidental finding of L adrenal nodule.    #L adrenal nodule   Incidentally found on CT c/a/p w/wo IV contrast done for TAVR evaluation. The left adrenal gland is thickened and a 1.2 x 0.8 cm left adrenal nodule is seen. The right adrenal gland is normal.  Primary team discussed with radiology. HU of the adrenal nodule not able to be accurately determined due to motion artifact, also given imaging was taken at venous phase.     Hx HTN, T1DM, obesity difficulty losing weight.    Test for excess adrenal hormones:  - Dex suppression test: AM cortisol 9.4 not suppressed with sufficiently high dexamethasone 374 (4/18/24). Repeat test AM cortisol 4.1 not suppressed with ACTH 9.5, dex level 449 (4/20/24). Concerning for possible Cushings, likely primary adrenal source given ACTH not elevated. Urine cortisol low at 1.2mcg/24 hour but only 200ml for 24 hours. Salivary cortisol cancelled for QNS, repeat salivary cortisol specimen received  - plasma metanephrines 46.8 normal range   - serum aldosterone is elevated to 37.4. Plasma renin activity 9.775. Ratio = 3.82, not elevated, no hyperaldosteronism.    - DHEAS 139 wnl. Androstenedione <10.      Recommendations:  - Follow up salivary cortisol (specimen received).   - May need to repeat 24 hour urine cortisol once renal function improved and no longer on CRRT  - Nonurgent CT non-contrast adrenal protocol to determine hounsfield units of adrenal nodule. (Of note, CT features suggesting malignancy include HU >10, mass size >4cm, and >50% contrast retention seen 10 minutes after contrast administration (contrast retention is of low specificity/sensitivity).)  - Will need follow up outpatient with Dr. Luis Dumont    #T1DM  #DKA  Has hx of T1DM since age 26  Endocrinologist: Dr. Luis Dumont  Home regimen: Lantus 33 units qhs, Novolog based on sliding scale TIDAC normally 3-5 units  Has Dexcom G7  A1c is 7.4%  C peptide undetectable <0.1 with glucose 107, confirming insulin deficiency  Transitioned to basal/bolus from insulin gtt  - agree with increasing Lantus 32 units QHS  - Agree with increasing Admelog to 9 units TIDAC  - Low dose admelog correction scale TIDAC, Low dose admelog correction scale QHS  - Goal BG in -180  - Of note, patient instructed on discharge from recent hospitalization at [place] to start farxiga for CHF. Given risks of DKA of SGLT2i in T1DM, would not start until better data is available for its benefits.  - Discharge regimen: home basal/bolus insulin, dose TBD.  - Follow up with Dr. Luis Dumont outpatient    #Hx of Hypothyroidism  TSH slightly elevated to 4.79-5.06 with normal FT4 1.3-1.5  - continue home LT4 75mcg daily  - repeat TFT's outpatient when clinically stabilized    ICU patient requires close monitoring.   Discussed recommendations with primary team provider.       Clemente Enamorado MD  Endocrine Fellow  Can be reached via [employer] teams.    For follow up questions, discharge recommendations, or new consults, please email [email or web address] ([email or web address]) or [email or web address] ([facility]) or call answering service at [phone] (weekdays); [phone] (nights/weekends).  For emergiencies please page fellow on call.     73F w HFrEF (EF 30%), mod AS, known LBBB, HTN, T1DM, CKD, hypothyroidism, chronic lymphedema, suspected OHS/LELIA on 3L NC home O2 p/w 1 episode of syncope with R arm jerking, likely 2/2 severe symptomatic AS. CTA imagings performed for TAVR eval, c/b DUNCAN on CKD. C/b febrile and hypotension, sepsis workup pending. CT c/a/p w/w/o IV contrast revealed incidental finding of L adrenal nodule.          #L adrenal nodule   Incidentally found on CT c/a/p w/wo IV contrast done for TAVR evaluation. The left adrenal gland is thickened and a 1.2 x 0.8 cm left adrenal nodule is seen. The right adrenal gland is normal.  Primary team discussed with radiology. HU of the adrenal nodule not able to be accurately determined due to motion artifact, also given imaging was taken at venous phase.     Hx HTN, T1DM, obesity difficulty losing weight.    Test for excess adrenal hormones:  - Dex suppression test: AM cortisol 9.4 not suppressed with sufficiently high dexamethasone 374 (4/18/24). Repeat test AM cortisol 4.1 not suppressed with ACTH 9.5, dex level 449 (4/20/24). Concerning for possible Cushings, likely primary adrenal source given ACTH not elevated. Urine cortisol low at 1.2mcg/24 hour but only 200ml for 24 hours. Salivary cortisol cancelled for QNS, repeat salivary cortisol specimen received  - plasma metanephrines 46.8 normal range   - serum aldosterone is elevated to 37.4. Plasma renin activity 9.775. Ratio = 3.82, not elevated, no hyperaldosteronism.    - DHEAS 139 wnl. Androstenedione <10.      Recommendations:  - Follow up salivary cortisol (specimen received).   - May need to repeat 24 hour urine cortisol once renal function improved and no longer on CRRT  - Nonurgent CT non-contrast adrenal protocol to determine hounsfield units of adrenal nodule. (Of note, CT features suggesting malignancy include HU >10, mass size >4cm, and >50% contrast retention seen 10 minutes after contrast administration (contrast retention is of low specificity/sensitivity).)  - Will need follow up outpatient with Dr. Luis Dumont    #T1DM  #DKA  Has hx of T1DM since age 26  Endocrinologist: Dr. Luis Dumont  Home regimen: Lantus 33 units qhs, Novolog based on sliding scale TIDAC normally 3-5 units  Has Dexcom G7  A1c is 7.4%  C peptide undetectable <0.1 with glucose 107, confirming insulin deficiency  Transitioned to basal/bolus from insulin gtt  - agree with increasing Lantus 32 units QHS  - Agree with increasing Admelog to 9 units TIDAC  - Low dose admelog correction scale TIDAC, Low dose admelog correction scale QHS  - Goal BG in -180  - Of note, patient instructed on discharge from recent hospitalization at [place] to start farxiga for CHF. Given risks of DKA of SGLT2i in T1DM, would not start until better data is available for its benefits.  - Discharge regimen: home basal/bolus insulin, dose TBD.  - Follow up with Dr. Luis Dumont outpatient    #Hx of Hypothyroidism  TSH slightly elevated to 4.79-5.06 with normal FT4 1.3-1.5  - continue home LT4 75mcg daily  - repeat TFT's outpatient when clinically stabilized    ICU patient requires close monitoring.   Discussed recommendations with primary team provider.       Clemente Enamorado MD  Endocrine Fellow  Can be reached via [employer] teams.    For follow up questions, discharge recommendations, or new consults, please email [email or web address] ([email or web address]) or [email or web address] ([facility]) or call answering service at [phone] (weekdays); [phone] (nights/weekends).  For emergiencies please page fellow on call.     73F w HFrEF (EF 30%), mod AS, known LBBB, HTN, T1DM, CKD, hypothyroidism, chronic lymphedema, suspected OHS/LELIA on 3L NC home O2 p/w 1 episode of syncope with R arm jerking, likely 2/2 severe symptomatic AS. CTA imagings performed for TAVR eval, c/b DUNCAN on CKD. C/b febrile and hypotension, sepsis workup pending. CT c/a/p w/w/o IV contrast revealed incidental finding of L adrenal nodule.    #L adrenal nodule    Incidentally found on CT c/a/p w/wo IV contrast done for TAVR evaluation. The left adrenal gland is thickened and a 1.2 x 0.8 cm left adrenal nodule is seen. The right adrenal gland is normal.   Primary team discussed with radiology. HU of the adrenal nodule not able to be accurately determined due to motion artifact, also given imaging was taken at venous phase.          Hx HTN, T1DM, obesity difficulty losing weight.     Test for excess adrenal hormones:   - Dex suppression test: AM cortisol 9.4 not suppressed with sufficiently high dexamethasone 374 (4/18/24). Repeat test AM cortisol 4.1 not suppressed with ACTH 9.5, dex level 449 (4/20/24). Concerning for possible Cushings, likely primary adrenal source given ACTH not elevated, however, patient is also in ICU on pressors and in a stressed state. Urine cortisol low at 1.2mcg/24 hour but only 200ml for 24 hours. Salivary cortisol cancelled for QNS, repeat salivary cortisol specimen received   - plasma metanephrines 46.8 normal range    - serum aldosterone is elevated to 37.4. Plasma renin activity 9.775. Ratio = 3.82, not elevated, no hyperaldosteronism.    - DHEAS 139 wnl. Androstenedione <10.        Recommendations:   - Patient with 2 positive DST - concerning for cushing syndrome. No indication for treatment at this time, would recommend repeating testing outpatient after patient resolved from acute critical illness - will give signout to patient's endocrinologist Dr. Luis Dumont prior to discharge   - Follow up salivary cortisol (specimen received)   - May need to repeat 24 hour urine cortisol once renal function improved and no longer on CRRT   - Nonurgent CT non-contrast adrenal protocol to determine hounsfield units of adrenal nodule. (Of note, CT features suggesting malignancy include HU >10, mass size >4cm, and >50% contrast retention seen 10 minutes after contrast administration (contrast retention is of low specificity/sensitivity).)   - Will need follow up outpatient with Dr. Luis Dumont     #T1DM  #DKA  Has hx of T1DM since age 26  Endocrinologist: Dr. Luis Dumont  Home regimen: Lantus 33 units qhs, Novolog based on sliding scale TIDAC normally 3-5 units  Has Dexcom G7  A1c is 7.4%  C peptide undetectable <0.1 with glucose 107, confirming insulin deficiency  Transitioned to basal/bolus from insulin gtt  - agree with increasing Lantus 32 units QHS  - Agree with increasing Admelog to 9 units TIDAC  - Low dose admelog correction scale TIDAC, Low dose admelog correction scale QHS  - Goal BG in -180  - Of note, patient instructed on discharge from recent hospitalization at [place] to start farxiga for CHF. Given risks of DKA of SGLT2i in T1DM, would not start until better data is available for its benefits.  - Discharge regimen: home basal/bolus insulin, dose TBD.  - Follow up with Dr. Luis Dumont outpatient    #Hx of Hypothyroidism  TSH slightly elevated to 4.79-5.06 with normal FT4 1.3-1.5  - continue home LT4 75mcg daily  - repeat TFT's outpatient when clinically stabilized    ICU patient requires close monitoring.   Discussed recommendations with primary team provider.       Clemente Enamorado MD  Endocrine Fellow  Can be reached via [employer] teams.    For follow up questions, discharge recommendations, or new consults, please email [email or web address] ([email or web address]) or [email or web address] ([facility]) or call answering service at [phone] (weekdays); [phone] (nights/weekends).  For emergiencies please page fellow on call.

## 2024-05-01 NOTE — PROGRESS NOTE ADULT - SUBJECTIVE AND OBJECTIVE BOX
PROGRESS NOTE:   Authored by Dr. Jane Sandy  Patient is a 73y old  Female who presents with a chief complaint of Syncope (01 May 2024 08:19)        OVERNIGHT EVENTS: No acute overnight events. Pt bladder scan revealed 650 cc urinary retention, straight cath inserted, pt voided 550 cc urine. Vasopressin dec from 0.04 to 0.02. CRRT was removing from 180 cc/hr  in pm , inc to 250cc in am. Circuit clotted in am and was replaced. CRRT restarted this am. Pt is 2L neg in last 12 hrs    SUBJECTIVE: Patient was seen and examined at bedside this morning.   Denies any nausea/vomiting/diarrhea, headache, shortness of breath, abdominal pain or chest pain/palpitations.   Patient responding appropriately to questions and able to make needs known.   Vital signs/imaging/labs/telemetry events reviewed.   No acute complaints or concerns. Pt is feeling well. Tolerating PO.  Stating she feels fine.     All other ROS neg except as above       MEDICATIONS  (STANDING):  ascorbic acid 500 milliGRAM(s) Oral daily  aspirin  chewable 81 milliGRAM(s) Oral daily  atorvastatin 80 milliGRAM(s) Oral at bedtime  calamine/zinc oxide Lotion 1 Application(s) Topical three times a day  chlorhexidine 2% Cloths 1 Application(s) Topical daily  chlorhexidine 4% Liquid 1 Application(s) Topical <User Schedule>  CRRT Treatment    <Continuous>  epoetin mendoza-epbx (RETACRIT) Injectable 03614 Unit(s) IV Push <User Schedule>  ferrous    sulfate 325 milliGRAM(s) Oral two times a day  heparin   Injectable 5000 Unit(s) SubCutaneous every 8 hours  heparin  Infusion Syringe 300 Unit(s)/Hr (0.6 mL/Hr) CRRT <Continuous>  insulin glargine Injectable (LANTUS) 26 Unit(s) SubCutaneous at bedtime  insulin lispro (ADMELOG) corrective regimen sliding scale   SubCutaneous Before meals and at bedtime  insulin lispro Injectable (ADMELOG) 7 Unit(s) SubCutaneous three times a day before meals  levothyroxine 75 MICROGram(s) Oral daily  multivitamin 1 Tablet(s) Oral daily  mupirocin 2% Ointment 1 Application(s) Topical three times a day  nystatin    Suspension 414869 Unit(s) Oral four times a day  petrolatum white Ointment 1 Application(s) Topical three times a day  PrismaSATE Dialysate BGK 4 / 2.5 5000 milliLiter(s) (1400 mL/Hr) CRRT <Continuous>  PrismaSOL Filtration BGK 4 / 2.5 5000 milliLiter(s) (300 mL/Hr) CRRT <Continuous>  PrismaSOL Filtration BGK 4 / 2.5 5000 milliLiter(s) (1000 mL/Hr) CRRT <Continuous>  senna 2 Tablet(s) Oral at bedtime  vasopressin Infusion 0.02 Unit(s)/Min (3 mL/Hr) IV Continuous <Continuous>    MEDICATIONS  (PRN):  polyethylene glycol 3350 17 Gram(s) Oral two times a day PRN Constipation      CAPILLARY BLOOD GLUCOSE      POCT Blood Glucose.: 272 mg/dL (01 May 2024 08:09)  POCT Blood Glucose.: 271 mg/dL (30 Apr 2024 21:51)  POCT Blood Glucose.: 299 mg/dL (30 Apr 2024 16:51)  POCT Blood Glucose.: 268 mg/dL (30 Apr 2024 11:43)    I&O's Summary    30 Apr 2024 07:01  -  01 May 2024 07:00  --------------------------------------------------------  IN: 2343.4 mL / OUT: 4071 mL / NET: -1727.6 mL    01 May 2024 07:01  -  01 May 2024 11:27  --------------------------------------------------------  IN: 12 mL / OUT: 618 mL / NET: -606 mL        PHYSICAL EXAM:  Vital Signs Last 24 Hrs  T(C): 36.7 (01 May 2024 08:00), Max: 37 (01 May 2024 03:00)  T(F): 98 (01 May 2024 08:00), Max: 98.6 (01 May 2024 03:00)  HR: 91 (01 May 2024 10:00) (76 - 121)  BP: --  BP(mean): --  RR: 16 (01 May 2024 10:00) (10 - 32)  SpO2: 100% (01 May 2024 10:00) (89% - 100%)    Parameters below as of 01 May 2024 10:00  Patient On (Oxygen Delivery Method): room air        PHYSICAL EXAM:     GENERAL: NAD  HEAD:  Atraumatic, Normocephalic  EYES: EOMI, conjunctiva and sclera clear, no nystagmus noted  ENT: Moist mucous membranes  CHEST/LUNG: normal work of breathing, Clear to auscultation bilaterally; No rales, rhonchi, wheezing, or rubs. Unlabored respirations  HEART:  Regular rate and rhythm; No murmurs, rubs, or gallops, normal S1/S2  ABDOMEN: normal bowel sounds; Soft, nontender, nondistended, no organomegaly   EXTREMITIES:  extensive chronic edema in b/l LE, dec Peripheral Pulses, No clubbing, cyanosis  MSK: No gross deformities noted, ROM wnl   Neurological:  A&Ox3, no focal deficits   SKIN: dry peeling flakey pale,  scattered psoriatic plaques throughout extremities and torso, no blistering  or  drainage  BL LE soft plaques easily lifted w/ mechanical debridement , w/ open skin & blistering and scant drainage  Rt plantar foot w/ dark blister around callous  No odor, erythema, increased warmth, tenderness, induration, fluctuance, nor crepitus  PSYCH: Normal mood, affect       LABS:                        7.5    9.67  )-----------( 143      ( 01 May 2024 10:26 )             24.5     05-01    134<L>  |  100  |  17  ----------------------------<  242<H>  4.1   |  22  |  1.54<H>    Ca    8.0<L>      01 May 2024 10:26  Phos  2.6     05-01  Mg     2.3     05-01    TPro  6.2  /  Alb  3.0<L>  /  TBili  0.6  /  DBili  x   /  AST  16  /  ALT  12  /  AlkPhos  70  05-01    PT/INR - ( 01 May 2024 04:39 )   PT: 12.0 sec;   INR: 1.15 ratio         PTT - ( 01 May 2024 04:39 )  PTT:27.7 sec          Tele Reviewed:    RADIOLOGY & ADDITIONAL TESTS:  Results Reviewed: yes  Imaging Personally Reviewed: yes  Electrocardiogram Personally Reviewed: yes

## 2024-05-01 NOTE — PROGRESS NOTE ADULT - ATTENDING COMMENTS
Initially admitted with syncope in the setting of severe AS s/p TAVR   Hospital course has been complicated by VT/VF cardiac arrest, DUNCAN requiring CVVH, heart block requiring PPM and contrast-induced skin sloughing  A+Ox3  Shock requiring Vasopressin in the setting of CVVH, likely due to intravascular depletion - wean as able  TTE with severely reduced LVEF  O2 sats mid 90s on room air  DASH/diabetic diet  DUNCAN requiring CVVH at 250 cc hour; patient is very total body volume overloaded - will continue  H/H low but acceptable on HSQ for DVT prophylaxis   Afebrile, no antibiotics  Sugars controlled on Lantus  Endocrine if following for adrenal nodule and concern for Cushings - f/u recs  Marily 4/24 and L subclavian Rocky 4/26  OOB Initially admitted with syncope in the setting of severe AS s/p TAVR   Hospital course has been complicated by VT/VF cardiac arrest, DUNCAN requiring CVVH, heart block requiring PPM and contrast-induced skin sloughing  A+Ox3  Shock requiring Vasopressin in the setting of CVVH, likely due to intravascular depletion - wean as able  TTE with severely reduced LVEF  O2 sats mid 90s on room air  DASH/diabetic diet  DUNCAN requiring CVVH at 250 cc hour; patient is very total body volume overloaded   Continue CVVH for aggressive fluid removal  H/H low but acceptable on HSQ for DVT prophylaxis   Afebrile, no antibiotics  Sugars controlled on Lantus  Endocrine if following for adrenal nodule and concern for Cushings - f/u recs  Marily 4/24 and L subclavian Rocky 4/26  OOB

## 2024-05-01 NOTE — PROGRESS NOTE ADULT - ATTENDING COMMENTS
73F w HFrEF (EF 30%), mod AS, known LBBB, HTN, T1DM, CKD, hypothyroidism, chronic lymphedema, suspected OHS/LELIA on 3L NC home O2 p/w 1 episode of syncope with R arm jerking, likely 2/2 severe symptomatic AS. CTA imagings performed for TAVR eval, c/b DUNCAN on CKD. C/b febrile and hypotension, sepsis workup pending. CT c/a/p w/w/o IV contrast revealed incidental finding of L adrenal nodule.  DM- hyperglycemia noted, adjust basal bolus as per fellow note  Cushings workup- DHEAS in adrenal cushings should be low  dex suppression test X2 is elevated   await MD MITCHELL  please note hard to interpret cushings testing in the acute illness setting especially with pt on CRRT and pressors  even if she was to have cushings, she would not be rushed to surgery at this time given other clinical comordibities  would recommend to inform her outpt endocrine of the need to fu Cushings workup and restest outpt when clinically improves  can consider re-testing when out of the ICU setting as well    complex pt high level decision making

## 2024-05-01 NOTE — PROGRESS NOTE ADULT - SUBJECTIVE AND OBJECTIVE BOX
Clemente Enamorado MD, PGY-4  Endocrinology fellow  Available on [employer] Teams    Interval Events: No acute overnight events. Pt seen and examined. Tolerating PO, no nausea/vomitting. No hypoglycemia symptoms. Denies fevers, chills, CP, SOB, Abdominal pain, constipation, Diarrhea.      MEDICATIONS  (STANDING):  ascorbic acid 500 milliGRAM(s) Oral daily  aspirin  chewable 81 milliGRAM(s) Oral daily  atorvastatin 80 milliGRAM(s) Oral at bedtime  calamine/zinc oxide Lotion 1 Application(s) Topical three times a day  chlorhexidine 2% Cloths 1 Application(s) Topical daily  chlorhexidine 4% Liquid 1 Application(s) Topical <User Schedule>  dexAMETHasone     Tablet 1 milliGRAM(s) Oral once  epoetin mendoza-epbx (RETACRIT) Injectable 97470 Unit(s) IV Push <User Schedule>  ferrous    sulfate 325 milliGRAM(s) Oral two times a day  heparin   Injectable 5000 Unit(s) SubCutaneous every 8 hours  insulin glargine Injectable (LANTUS) 32 Unit(s) SubCutaneous at bedtime  insulin lispro (ADMELOG) corrective regimen sliding scale   SubCutaneous Before meals and at bedtime  insulin lispro Injectable (ADMELOG) 9 Unit(s) SubCutaneous three times a day before meals  levothyroxine 75 MICROGram(s) Oral daily  multivitamin 1 Tablet(s) Oral daily  mupirocin 2% Ointment 1 Application(s) Topical three times a day  nystatin    Suspension 458833 Unit(s) Oral four times a day  petrolatum white Ointment 1 Application(s) Topical three times a day  senna 2 Tablet(s) Oral at bedtime  vasopressin Infusion 0.02 Unit(s)/Min (3 mL/Hr) IV Continuous <Continuous>    MEDICATIONS  (PRN):  polyethylene glycol 3350 17 Gram(s) Oral two times a day PRN Constipation      Allergies    contrast media (iodine-based) (Fever; Vomiting; Flushing; Hypotension; Rash; Stomach Upset)  Ativan (Rash; Urticaria)  penicillins (Hives (Mod to Severe); Anaphylaxis (Mod to Severe); Short breath (Mod to Severe); Angioedema (Mod to Severe))    Intolerances          ================PHYSICAL EXAM======================  VITALS: T(C): 36.7 (05-01-24 @ 08:00)  T(F): 98 (05-01-24 @ 08:00), Max: 98.6 (05-01-24 @ 03:00)  HR: 90 (05-01-24 @ 14:00) (74 - 121)  BP: --  RR:  (10 - 32)  SpO2:  (69% - 100%)  Wt(kg): --  GENERAL: NAD, appears comfortable  EYES: No proptosis, no lid lag, anicteric  HEENT:  Atraumatic, Normocephalic, moist mucous membranes  RESPIRATORY: nonlabored respirations, no wheezing  PSYCH: Alert and awake  ===================================================    CAPILLARY BLOOD GLUCOSE      POCT Blood Glucose.: 183 mg/dL (01 May 2024 12:03)  POCT Blood Glucose.: 272 mg/dL (01 May 2024 08:09)  POCT Blood Glucose.: 271 mg/dL (30 Apr 2024 21:51)  POCT Blood Glucose.: 299 mg/dL (30 Apr 2024 16:51)      05-01    134<L>  |  100  |  17  ----------------------------<  242<H>  4.1   |  22  |  1.54<H>    eGFR: 36<L>    Ca    8.0<L>      05-01  Mg     2.3     05-01  Phos  2.6     05-01    TPro  6.2  /  Alb  3.0<L>  /  TBili  0.6  /  DBili  x   /  AST  16  /  ALT  12  /  AlkPhos  70  05-01      A1C with Estimated Average Glucose Result: 7.4 % (03-30-24 @ 08:21)  A1C with Estimated Average Glucose Result: 6.8 % (01-10-24 @ 08:37)  A1C with Estimated Average Glucose Result: 7.3 % (08-12-23 @ 07:58)  A1C with Estimated Average Glucose Result: 8.2 % (06-05-23 @ 06:10)      Thyroid Function Tests:  04-14 @ 07:18 TSH 5.06 FreeT4 1.3 T3 -- Anti TPO -- Anti Thyroglobulin Ab -- TSI --  04-13 @ 07:05 TSH 4.79 FreeT4 1.5 T3 -- Anti TPO -- Anti Thyroglobulin Ab -- TSI --

## 2024-05-01 NOTE — PROGRESS NOTE ADULT - ASSESSMENT
73F w HFrEF (EF 30%), mod AS, known LBBB, HTN, IDDM1, CKD, hypothyroidism, chronic lymphedema, suspected OHS/LELIA on 3L NC home O2 p/w 1 episode of syncope. Recent admission at [facility] (3/29-4/5) for ADHF and DUNCAN s/p diuresis, discharged with new home O2 3L NC suspecting OHS/LELIA pending outpatient w/u. Since discharge a week ago, she has been staying at home with   Pt had sob walking to car. Once in car, she was still breathing heavily. Partner noticed pt was not responding to verbal stimuli for 10-15sec and witnessed R arm jerking. Pt gained consciousness quickly without postictal symptoms. Pt went to Cardiologist Dr. Nathan who recommended pt to come to ED. No fever, cp, abd pain, n/v. Leg swelling is improved from prior. Pt on torsemide 40mg which she has been taking. Pt was discharged on 3LNC which she is currently on. Patient reports she did not take Farxiga that she was discharged on as she was concerned about side effects.     In ED, patient was found afebrile, hemodynamically stable, breathing well on 3L NC. Initial labs notable for mild hyponatremia, DUNCAN on CKD, elevated proBNP and elevated pCO2 both improved from prior.  pt also noticed with abn creatinine      1- CKD IV  with DUNCAN   2- CHF   3- lymphedema   4- severe AS  s/p tavr 4/24  5- syncope   6- hyperkalemia  7- hypotension         suspect ATN from hypotension along with contrast nephropathy   creatinine worsening requiring renal replacement therapy, HD initiated 4/18  still extensively fluid overloaded   cont CRRT  bfr 200 heparin 300 unit/hr to circuit and cont  dfr 1400 fluid removal 180  cc/hr fluid removal per hr   see new orders   derm following for rash  pressor support  with vasopressin   d.w CCU team

## 2024-05-01 NOTE — PROGRESS NOTE ADULT - SUBJECTIVE AND OBJECTIVE BOX
EVELYN LOPEZ  MRN-[id number]  Patient is a 73y old  Female who presents with a chief complaint of Syncope (01 May 2024 14:59)    HPI:  73F w HFrEF (EF 30%), mod AS, known LBBB, HTN, IDDM1, CKD, hypothyroidism, chronic lymphedema, suspected OHS/LELIA on 3L NC home O2 p/w 1 episode of syncope. Recent admission at [facility] (3/29-4/5) for ADHF and DUNCAN s/p diuresis, discharged with new home O2 3L NC suspecting OHS/LELIA pending outpatient w/u. Since discharge a week ago, she has been staying at home with   Pt had sob walking to car. Once in car, she was still breathing heavily. Partner noticed pt was not responding to verbal stimuli for 10-15sec and witnessed R arm jerking. Pt gained consciousness quickly without postictal symptoms. Pt went to Cardiologist Dr. Nathan who recommended pt to come to ED. No fever, cp, abd pain, n/v. Leg swelling is improved from prior. Pt on torsemide 40mg which she has been taking. Pt was discharged on 3LNC which she is currently on. Patient reports she did not take Farxiga that she was discharged on as she was concerned about side effects.     In ED, patient was found afebrile, hemodynamically stable, breathing well on 3L NC. Initial labs notable for mild hyponatremia, DUNCAN on CKD, elevated proBNP and elevated pCO2 both improved from prior. (12 Apr 2024 16:31)      Hospital Course:    24 HOUR EVENTS:    REVIEW OF SYSTEMS:    CONSTITUTIONAL: No weakness, fevers or chills  EYES/ENT: No visual changes;  No vertigo or throat pain   NECK: No pain or stiffness  RESPIRATORY: No cough, wheezing, hemoptysis; No shortness of breath  CARDIOVASCULAR: No chest pain or palpitations  GASTROINTESTINAL: No abdominal or epigastric pain. No nausea, vomiting, or hematemesis; No diarrhea or constipation. No melena or hematochezia.  GENITOURINARY: No dysuria, frequency or hematuria  NEUROLOGICAL: No numbness or weakness  SKIN: No itching, rashes      ICU Vital Signs Last 24 Hrs  T(C): 36.9 (01 May 2024 19:00), Max: 37 (01 May 2024 03:00)  T(F): 98.4 (01 May 2024 19:00), Max: 98.6 (01 May 2024 03:00)  HR: 80 (01 May 2024 20:00) (74 - 104)  BP: --  BP(mean): --  ABP: 120/38 (01 May 2024 20:00) (84/26 - 138/54)  ABP(mean): 66 (01 May 2024 20:00) (44 - 88)  RR: 23 (01 May 2024 20:00) (10 - 37)  SpO2: 100% (01 May 2024 20:00) (69% - 100%)    O2 Parameters below as of 01 May 2024 19:00  Patient On (Oxygen Delivery Method): room air            CVP(mm Hg): --  CO: --  CI: --  PA: --  PA(mean): --  PA(direct): --  PCWP: --  LA: --  RA: --  SVR: --  SVRI: --  PVR: --  PVRI: --  I&O's Summary    30 Apr 2024 07:01  -  01 May 2024 07:00  --------------------------------------------------------  IN: 2343.4 mL / OUT: 4071 mL / NET: -1727.6 mL    01 May 2024 07:01  -  01 May 2024 20:15  --------------------------------------------------------  IN: 228.5 mL / OUT: 2484 mL / NET: -2255.5 mL        CAPILLARY BLOOD GLUCOSE    CAPILLARY BLOOD GLUCOSE      POCT Blood Glucose.: 165 mg/dL (01 May 2024 16:26)      PHYSICAL EXAM:  GENERAL: No acute distress, well-developed  HEAD:  Atraumatic, Normocephalic  EYES: EOMI, PERRLA, conjunctiva and sclera clear  NECK: Supple, no lymphadenopathy, no JVD  CHEST/LUNG: CTAB; No wheezes, rales, or rhonchi  HEART: Regular rate and rhythm. Normal S1/S2. No murmurs, rubs, or gallops  ABDOMEN: Soft, non-tender, non-distended; normal bowel sounds, no organomegaly  EXTREMITIES:  2+ peripheral pulses b/l, No clubbing, cyanosis, or edema  NEUROLOGY: A&O x 3, no focal deficits  SKIN: No rashes or lesions    ============================I/O===========================   I&O's Detail    30 Apr 2024 07:01  -  01 May 2024 07:00  --------------------------------------------------------  IN:    IV PiggyBack: 668.1 mL    Oral Fluid: 1550 mL    Vasopressin: 125.3 mL  Total IN: 2343.4 mL    OUT:    Intermittent Catheterization - Urethral (mL): 550 mL    Other (mL): 3521 mL  Total OUT: 4071 mL    Total NET: -1727.6 mL      01 May 2024 07:01  -  01 May 2024 20:15  --------------------------------------------------------  IN:    Oral Fluid: 200 mL    Vasopressin: 28.5 mL  Total IN: 228.5 mL    OUT:    Other (mL): 2484 mL  Total OUT: 2484 mL    Total NET: -2255.5 mL        ============================ LABS =========================                        8.1    10.45 )-----------( 145      ( 01 May 2024 16:35 )             26.3     05-01    134<L>  |  102  |  14  ----------------------------<  161<H>  4.5   |  21<L>  |  1.38<H>    Ca    8.2<L>      01 May 2024 16:35  Phos  2.3     05-01  Mg     2.4     05-01    TPro  6.4  /  Alb  3.2<L>  /  TBili  0.7  /  DBili  x   /  AST  16  /  ALT  11  /  AlkPhos  77  05-01                LIVER FUNCTIONS - ( 01 May 2024 16:35 )  Alb: 3.2 g/dL / Pro: 6.4 g/dL / ALK PHOS: 77 U/L / ALT: 11 U/L / AST: 16 U/L / GGT: x           PT/INR - ( 01 May 2024 04:39 )   PT: 12.0 sec;   INR: 1.15 ratio         PTT - ( 01 May 2024 04:39 )  PTT:27.7 sec  ABG - ( 01 May 2024 01:08 )  pH, Arterial: 7.39  pH, Blood: x     /  pCO2: 41    /  pO2: 91    / HCO3: 25    / Base Excess: -0.2  /  SaO2: 98.6              Blood Gas Arterial, Lactate: 1.0 mmol/L (05-01-24 @ 01:08)  Blood Gas Arterial, Lactate: 0.9 mmol/L (04-30-24 @ 00:25)  Blood Gas Arterial, Lactate: 0.9 mmol/L (04-29-24 @ 00:44)    Urinalysis Basic - ( 01 May 2024 16:35 )    Color: x / Appearance: x / SG: x / pH: x  Gluc: 161 mg/dL / Ketone: x  / Bili: x / Urobili: x   Blood: x / Protein: x / Nitrite: x   Leuk Esterase: x / RBC: x / WBC x   Sq Epi: x / Non Sq Epi: x / Bacteria: x      ======================Micro/Rad/Cardio=================  Telemtry: Reviewed   EKG: Reviewed  CXR: Reviewed  Culture: Reviewed   Echo:   Cath:   ======================================================  PAST MEDICAL & SURGICAL HISTORY:  Hypertension      Diabetes      Lymphedema      H/O left bundle branch block      History of left bundle branch block (LBBB)      Systolic heart failure, chronic      Type 1 diabetes      H/O cataract  2020      History of surgical removal of meniscus of knee  left in 1971      Frozen shoulder  2000      H/O Achilles tendon repair  lengthened bilaterally, 2000      Fractured skull  1968        ====================ASSESSMENT ==============  73F PMH: HFrEF (EF 30%), AS, LBBB, HTN, DM1, CKD, hypothyroidism, chronic lymphedema, LELIA (3L home O2) p/w for syncope 2/2 severe AS, s/p TAVR 4/24. Course complicated by contrast induced allergic reaction (had CTA for TAVR eval) and contrast related renal failure (acute on chronic) requiring HD. TAVR c/b VT and vfib requiring shock and flash pulmonary edema causing AHRF requiring intubation. Currently extubated,  on CVVPHD for fluid removal, and on pressor support for vasoplegia and hypovolemia due to CRRT.    Plan:  ====================== NEUROLOGY=====================  - Extubated 4/26  - Currently AOx4  - OOBTC, c/w PT as tolerated    ==================== RESPIRATORY======================  AHRF  2/2 flash pulmonary edema  LELIA (on 3L home oxygen)  - Extubated 4/26  - 4/27 CXR - no consolidation or pleural effusion  - intermittently requiring nasal cannula for comfort    ====================CARDIOVASCULAR==================  Shock state  -  vasoplegia from sedation vs losing fluid from CVVHD vs distributive septic shock  - on vasopressin 0.02  - f/u US for IVC collapsibility    VT/vfib s/p shock  2:1 AV block  Severe AS s/p TAVR  - Known LBBB with first degree AV block on telemetry  - s/p Micra 4/25, no longer requiring TVP (eventual candidate for CRT per EP)  - c/w ASA, atorvastatin     HFrEF  - EF 25%  - Holding GDMT while on pressors    ===================HEMATOLOGIC/ONC ===================  Anemia  -AARTI vs AOCD vs anemia 2/2 blood loss vs myelosuppression iso critical illness  -trend H&H, transfuse PRN    Thrombocytopenia  -  myelosuppression iso critical illness  - no active signs of bleeding, monitor plts, transfuse PRN    ===================== RENAL =========================  ESRD  - from ATN and contrast nephropathy  - c/w CRRT,  rate to180 -  250 cc/hr given persistent volume overload for one more day and switch to intermittent HD  - pressor support PRN for volume removal   - Strict I/Os  - Trend BMP, lytes  - Avoid nephrotoxins  - Nephro following  - f/u bladder scan after CVVHD has stopped    ==================== GASTROINTESTINAL===================  - tolerating  diet  - Bowel regimen with Miralax and Senna     =======================    ENDOCRINE  =====================  DM1  DKA    - on  basal/bolus lantus 32 QHS, admelog inc to 9u TIDAC   - monitor POCT FS, goal 140-180    Hypothyroidism  - c/w home levothyroxine     Cushing  L adrenal nodule  -dexamethasone suppression test w/ am cortisol - not suppressed, repeat 1mg DST   -repeat 24hr urine cortisol once not on CRRT, previous 1.2  -f/u MN salivary cortisol, dexamethasone level 374  - f/u CT adrenal nodule, suspicious for malignancy      ========================INFECTIOUS DISEASE================  - Monitor WBC and for fevers    Oral Candidiasis  - c/w nystatin     Acute generalized exanthematous pustulosis   - c/w calamine,  Vaseline    ===================== SKIN =========================  Psoriasis   Lymphedema   R. Diabetic foot ulcer  - Elevate legs  - Compression stockings, ACE wrapping  - Podiatry following- foot wounds management, decubiti precautions f/u outpt, check for iodine gel allerg          Patient requires continuous monitoring with bedside rhythm monitoring, pulse ox monitoring, and intermittent blood gas analysis. Care plan discussed with ICU care team. Patient remained critical and at risk for life threatening decompensation.  Patient seen, examined and plan discussed with CCU team during rounds.     I have personally provided ____ minutes of critical care time excluding time spent on separate procedures, in addition to initial critical care time provided by the CICU Attending, Dr. Sandoval     By signing my name below, I, Nisha Mccormick, attest that this documentation has been prepared under the direction and in the presence of Becka Horn NP   Electronically signed: Madison Villanueva, 05-01-24 @ 20:15    I, Becka Horn, personally performed the services described in this documentation. all medical record entries made by the heraclioibe were at my direction and in my presence. I have reviewed the chart and agree that the record reflects my personal performance and is accurate and complete  Electronically signed: Becka Horn NP        EVELYN LOPEZ  MRN-[id number]  Patient is a 73y old  Female who presents with a chief complaint of Syncope (01 May 2024 14:59)    HPI:  73F w HFrEF (EF 30%), mod AS, known LBBB, HTN, IDDM1, CKD, hypothyroidism, chronic lymphedema, suspected OHS/LELIA on 3L NC home O2 p/w 1 episode of syncope. Recent admission at \[facility]"" (3/29-4/5) for ADHF and DUNCAN s/p diuresis, discharged with new home O2 3L NC suspecting OHS/LELIA pending outpatient w/u. Since discharge a week ago, she has been staying at home with   Pt had sob walking to car. Once in car, she was still breathing heavily. Partner noticed pt was not responding to verbal stimuli for 10-15sec and witnessed R arm jerking. Pt gained consciousness quickly without postictal symptoms. Pt went to Cardiologist Dr. Nathan who recommended pt to come to ED. No fever, cp, abd pain, n/v. Leg swelling is improved from prior. Pt on torsemide 40mg which she has been taking. Pt was discharged on 3LNC which she is currently on. Patient reports she did not take Farxiga that she was discharged on as she was concerned about side effects.     In ED, patient was found afebrile, hemodynamically stable, breathing well on 3L NC. Initial labs notable for mild hyponatremia, DUNCAN on CKD, elevated proBNP and elevated pCO2 both improved from prior. (12 Apr 2024 16:31)      Hospital Course:    24 HOUR EVENTS:    REVIEW OF SYSTEMS:    CONSTITUTIONAL: No weakness, fevers or chills  EYES/ENT: No visual changes;  No vertigo or throat pain   NECK: No pain or stiffness  RESPIRATORY: No cough, wheezing, hemoptysis; No shortness of breath  CARDIOVASCULAR: No chest pain or palpitations  GASTROINTESTINAL: No abdominal or epigastric pain. No nausea, vomiting, or hematemesis; No diarrhea or constipation. No melena or hematochezia.  GENITOURINARY: No dysuria, frequency or hematuria  NEUROLOGICAL: No numbness or weakness  SKIN: No itching, rashes      ICU Vital Signs Last 24 Hrs  T(C): 36.9 (01 May 2024 19:00), Max: 37 (01 May 2024 03:00)  T(F): 98.4 (01 May 2024 19:00), Max: 98.6 (01 May 2024 03:00)  HR: 80 (01 May 2024 20:00) (74 - 104)  BP: --  BP(mean): --  ABP: 120/38 (01 May 2024 20:00) (84/26 - 138/54)  ABP(mean): 66 (01 May 2024 20:00) (44 - 88)  RR: 23 (01 May 2024 20:00) (10 - 37)  SpO2: 100% (01 May 2024 20:00) (69% - 100%)    O2 Parameters below as of 01 May 2024 19:00  Patient On (Oxygen Delivery Method): room air            CVP(mm Hg): --  CO: --  CI: --  PA: --  PA(mean): --  PA(direct): --  PCWP: --  LA: --  RA: --  SVR: --  SVRI: --  PVR: --  PVRI: --  I&O's Summary    30 Apr 2024 07:01  -  01 May 2024 07:00  --------------------------------------------------------  IN: 2343.4 mL / OUT: 4071 mL / NET: -1727.6 mL    01 May 2024 07:01  -  01 May 2024 20:15  --------------------------------------------------------  IN: 228.5 mL / OUT: 2484 mL / NET: -2255.5 mL        CAPILLARY BLOOD GLUCOSE    CAPILLARY BLOOD GLUCOSE      POCT Blood Glucose.: 165 mg/dL (01 May 2024 16:26)      PHYSICAL EXAM:  GENERAL: No acute distress, well-developed  HEAD:  Atraumatic, Normocephalic  EYES: EOMI, PERRLA, conjunctiva and sclera clear  NECK: Supple, no lymphadenopathy, no JVD  CHEST/LUNG: CTAB; No wheezes, rales, or rhonchi  HEART: Regular rate and rhythm. Normal S1/S2. No murmurs, rubs, or gallops  ABDOMEN: Soft, non-tender, non-distended; normal bowel sounds, no organomegaly  EXTREMITIES:  2+ peripheral pulses b/l, No clubbing, cyanosis, or edema  NEUROLOGY: A&O x 3, no focal deficits  SKIN: No rashes or lesions    ============================I/O===========================   I&O's Detail    30 Apr 2024 07:01  -  01 May 2024 07:00  --------------------------------------------------------  IN:    IV PiggyBack: 668.1 mL    Oral Fluid: 1550 mL    Vasopressin: 125.3 mL  Total IN: 2343.4 mL    OUT:    Intermittent Catheterization - Urethral (mL): 550 mL    Other (mL): 3521 mL  Total OUT: 4071 mL    Total NET: -1727.6 mL      01 May 2024 07:01  -  01 May 2024 20:15  --------------------------------------------------------  IN:    Oral Fluid: 200 mL    Vasopressin: 28.5 mL  Total IN: 228.5 mL    OUT:    Other (mL): 2484 mL  Total OUT: 2484 mL    Total NET: -2255.5 mL        ============================ LABS =========================                        8.1    10.45 )-----------( 145      ( 01 May 2024 16:35 )             26.3     05-01    134<L>  |  102  |  14  ----------------------------<  161<H>  4.5   |  21<L>  |  1.38<H>    Ca    8.2<L>      01 May 2024 16:35  Phos  2.3     05-01  Mg     2.4     05-01    TPro  6.4  /  Alb  3.2<L>  /  TBili  0.7  /  DBili  x   /  AST  16  /  ALT  11  /  AlkPhos  77  05-01                LIVER FUNCTIONS - ( 01 May 2024 16:35 )  Alb: 3.2 g/dL / Pro: 6.4 g/dL / ALK PHOS: 77 U/L / ALT: 11 U/L / AST: 16 U/L / GGT: x           PT/INR - ( 01 May 2024 04:39 )   PT: 12.0 sec;   INR: 1.15 ratio         PTT - ( 01 May 2024 04:39 )  PTT:27.7 sec  ABG - ( 01 May 2024 01:08 )  pH, Arterial: 7.39  pH, Blood: x     /  pCO2: 41    /  pO2: 91    / HCO3: 25    / Base Excess: -0.2  /  SaO2: 98.6              Blood Gas Arterial, Lactate: 1.0 mmol/L (05-01-24 @ 01:08)  Blood Gas Arterial, Lactate: 0.9 mmol/L (04-30-24 @ 00:25)  Blood Gas Arterial, Lactate: 0.9 mmol/L (04-29-24 @ 00:44)    Urinalysis Basic - ( 01 May 2024 16:35 )    Color: x / Appearance: x / SG: x / pH: x  Gluc: 161 mg/dL / Ketone: x  / Bili: x / Urobili: x   Blood: x / Protein: x / Nitrite: x   Leuk Esterase: x / RBC: x / WBC x   Sq Epi: x / Non Sq Epi: x / Bacteria: x      ======================Micro/Rad/Cardio=================  Telemtry: Reviewed   EKG: Reviewed  CXR: Reviewed  Culture: Reviewed   Echo:   Cath:   ======================================================  PAST MEDICAL & SURGICAL HISTORY:  Hypertension      Diabetes      Lymphedema      H/O left bundle branch block      History of left bundle branch block (LBBB)      Systolic heart failure, chronic      Type 1 diabetes      H/O cataract  2020      History of surgical removal of meniscus of knee  left in 1971      Frozen shoulder  2000      H/O Achilles tendon repair  lengthened bilaterally, 2000      Fractured skull  1968        ====================ASSESSMENT ==============  73F PMH: HFrEF (EF 30%), AS, LBBB, HTN, DM1, CKD, hypothyroidism, chronic lymphedema, LELIA (3L home O2) p/w for syncope 2/2 severe AS, s/p TAVR 4/24. Course complicated by contrast induced allergic reaction (had CTA for TAVR eval) and contrast related renal failure (acute on chronic) requiring HD. TAVR c/b VT and vfib requiring shock and flash pulmonary edema causing AHRF requiring intubation. Currently extubated,  on CVVPHD for fluid removal, and on pressor support for vasoplegia and hypovolemia due to CRRT.    Plan:  ====================== NEUROLOGY=====================  - Extubated 4/26  - Currently AOx4  - OOBTC, c/w PT as tolerated    ==================== RESPIRATORY======================  AHRF  2/2 flash pulmonary edema  LELIA (on 3L home oxygen)  - Extubated 4/26  - 4/27 CXR - no consolidation or pleural effusion  - intermittently requiring nasal cannula for comfort    ====================CARDIOVASCULAR==================  Shock state  -  vasoplegia from sedation vs losing fluid from CVVHD vs distributive septic shock  - on vasopressin 0.02  - f/u US for IVC collapsibility    VT/vfib s/p shock  2:1 AV block  Severe AS s/p TAVR  - Known LBBB with first degree AV block on telemetry  - s/p Micra 4/25, no longer requiring TVP (eventual candidate for CRT per EP)  - c/w ASA, atorvastatin     HFrEF  - EF 25%  - Holding GDMT while on pressors    ===================HEMATOLOGIC/ONC ===================  Anemia  -AARTI vs AOCD vs anemia 2/2 blood loss vs myelosuppression iso critical illness  -trend H&H, transfuse PRN    Thrombocytopenia  -  myelosuppression iso critical illness  - no active signs of bleeding, monitor plts, transfuse PRN    ===================== RENAL =========================  ESRD  - from ATN and contrast nephropathy  - c/w CRRT,  rate to180 -  250 cc/hr given persistent volume overload for one more day and switch to intermittent HD  - pressor support PRN for volume removal   - Strict I/Os  - Trend BMP, lytes  - Avoid nephrotoxins  - Nephro following  - f/u bladder scan after CVVHD has stopped    ==================== GASTROINTESTINAL===================  - tolerating  diet  - Bowel regimen with Miralax and Senna     =======================    ENDOCRINE  =====================  DM1  DKA    - on  basal/bolus lantus 32 QHS, admelog inc to 9u TIDAC   - monitor POCT FS, goal 140-180    Hypothyroidism  - c/w home levothyroxine     Cushing  L adrenal nodule  -dexamethasone suppression test w/ am cortisol - not suppressed, repeat 1mg DST   -repeat 24hr urine cortisol once not on CRRT, previous 1.2  -f/u MN salivary cortisol, dexamethasone level 374  - f/u CT adrenal nodule, suspicious for malignancy      ========================INFECTIOUS DISEASE================  - Monitor WBC and for fevers    Oral Candidiasis  - c/w nystatin     Acute generalized exanthematous pustulosis   - c/w calamine,  Vaseline    ===================== SKIN =========================  Psoriasis   Lymphedema   R. Diabetic foot ulcer  - Elevate legs  - Compression stockings, ACE wrapping  - Podiatry following- foot wounds management, decubiti precautions f/u outpt, check for iodine gel allergy          Patient requires continuous monitoring with bedside rhythm monitoring, pulse ox monitoring, and intermittent blood gas analysis. Care plan discussed with ICU care team. Patient remained critical and at risk for life threatening decompensation.  Patient seen, examined and plan discussed with CCU team during rounds.     I have personally provided ____ minutes of critical care time excluding time spent on separate procedures, in addition to initial critical care time provided by the CICU Attending, Dr. Sandoval     By signing my name below, I, Nisha Mccormick, attest that this documentation has been prepared under the direction and in the presence of Becka Horn NP   Electronically signed: Madison Villanueva, 05-01-24 @ 20:15    I, Becka Horn, personally performed the services described in this documentation. all medical record entries made by the heraclioibe were at my direction and in my presence. I have reviewed the chart and agree that the record reflects my personal performance and is accurate and complete  Electronically signed: Becka Horn NP        EVELYN LOPEZ  MRN-[id number]  Patient is a 73y old  Female who presents with a chief complaint of Syncope (01 May 2024 14:59)    HPI: 73F w HFrEF (EF 30%), mod AS, known LBBB, HTN, IDDM1, CKD, hypothyroidism, chronic lymphedema, suspected OHS/LELIA on 3L NC home O2 p/w 1 episode of syncope. Recent admission at [facility] (3/29-4/5) for ADHF and DUNCAN s/p diuresis, discharged with new home O2 3L NC suspecting OHS/LELIA pending outpatient w/u. Since discharge a week ago, she has been staying at home with   Pt had sob walking to car. Once in car, she was still breathing heavily. Partner noticed pt was not responding to verbal stimuli for 10-15sec and witnessed R arm jerking. Pt gained consciousness quickly without postictal symptoms. Pt went to Cardiologist Dr. Nathan who recommended pt to come to ED. No fever, cp, abd pain, n/v. Leg swelling is improved from prior. Pt on torsemide 40mg which she has been taking. Pt was discharged on 3LNC which she is currently on. Patient reports she did not take Farxiga that she was discharged on as she was concerned about side effects.     In ED, patient was found afebrile, hemodynamically stable, breathing well on 3L NC. Initial labs notable for mild hyponatremia, DUNCAN on CKD, elevated proBNP and elevated pCO2 both improved from prior. (12 Apr 2024 16:31)    REVIEW OF SYSTEMS:  CONSTITUTIONAL: No weakness, fevers or chills  EYES/ENT: No visual changes;  No vertigo or throat pain   NECK: No pain or stiffness  RESPIRATORY: No cough, wheezing, hemoptysis; No shortness of breath  CARDIOVASCULAR: No chest pain or palpitations  GASTROINTESTINAL: No abdominal or epigastric pain. No nausea, vomiting, or hematemesis; No diarrhea or constipation. No melena or hematochezia.  GENITOURINARY: No dysuria, frequency or hematuria  NEUROLOGICAL: No numbness or weakness  SKIN: No itching, rashes      ICU Vital Signs Last 24 Hrs  T(C): 36.9 (01 May 2024 19:00), Max: 37 (01 May 2024 03:00)  T(F): 98.4 (01 May 2024 19:00), Max: 98.6 (01 May 2024 03:00)  HR: 80 (01 May 2024 20:00) (74 - 104)  ABP: 120/38 (01 May 2024 20:00) (84/26 - 138/54)  ABP(mean): 66 (01 May 2024 20:00) (44 - 88)  RR: 23 (01 May 2024 20:00) (10 - 37)  SpO2: 100% (01 May 2024 20:00) (69% - 100%)    O2 Parameters below as of 01 May 2024 19:00  Patient On (Oxygen Delivery Method): room air      I&O's Summary    30 Apr 2024 07:01  -  01 May 2024 07:00  --------------------------------------------------------  IN: 2343.4 mL / OUT: 4071 mL / NET: -1727.6 mL    01 May 2024 07:01  -  01 May 2024 20:15  --------------------------------------------------------  IN: 228.5 mL / OUT: 2484 mL / NET: -2255.5 mL      CAPILLARY BLOOD GLUCOSE  POCT Blood Glucose.: 165 mg/dL (01 May 2024 16:26)  ============================I/O===========================   I&O's Detail    30 Apr 2024 07:01  -  01 May 2024 07:00  --------------------------------------------------------  IN:    IV PiggyBack: 668.1 mL    Oral Fluid: 1550 mL    Vasopressin: 125.3 mL  Total IN: 2343.4 mL    OUT:    Intermittent Catheterization - Urethral (mL): 550 mL    Other (mL): 3521 mL  Total OUT: 4071 mL    Total NET: -1727.6 mL      01 May 2024 07:01  -  01 May 2024 20:15  --------------------------------------------------------  IN:    Oral Fluid: 200 mL    Vasopressin: 28.5 mL  Total IN: 228.5 mL    OUT:    Other (mL): 2484 mL  Total OUT: 2484 mL    Total NET: -2255.5 mL  ============================ LABS =========================                     8.1    10.45 )-----------( 145      ( 01 May 2024 16:35 )             26.3     05-01    134<L>  |  102  |  14  ----------------------------<  161<H>  4.5   |  21<L>  |  1.38<H>    Ca    8.2<L>      01 May 2024 16:35  Phos  2.3     05-01  Mg     2.4     05-01    TPro  6.4  /  Alb  3.2<L>  /  TBili  0.7  /  DBili  x   /  AST  16  /  ALT  11  /  AlkPhos  77  05-01    LIVER FUNCTIONS - ( 01 May 2024 16:35 )  Alb: 3.2 g/dL / Pro: 6.4 g/dL / ALK PHOS: 77 U/L / ALT: 11 U/L / AST: 16 U/L / GGT: x           PT/INR - ( 01 May 2024 04:39 )   PT: 12.0 sec;   INR: 1.15 ratio    PTT - ( 01 May 2024 04:39 )  PTT:27.7 sec    ABG - ( 01 May 2024 01:08 )  pH, Arterial: 7.39  pH, Blood: x     /  pCO2: 41    /  pO2: 91    / HCO3: 25    / Base Excess: -0.2  /  SaO2: 98.6      Blood Gas Arterial, Lactate: 1.0 mmol/L (05-01-24 @ 01:08)  Blood Gas Arterial, Lactate: 0.9 mmol/L (04-30-24 @ 00:25)  Blood Gas Arterial, Lactate: 0.9 mmol/L (04-29-24 @ 00:44)    Urinalysis Basic - ( 01 May 2024 16:35 )    Color: x / Appearance: x / SG: x / pH: x  Gluc: 161 mg/dL / Ketone: x  / Bili: x / Urobili: x   Blood: x / Protein: x / Nitrite: x   Leuk Esterase: x / RBC: x / WBC x   Sq Epi: x / Non Sq Epi: x / Bacteria: x  ======================Micro/Rad/Cardio=================  Telemtry: Reviewed   EKG: Reviewed  CXR: Reviewed  Culture: Reviewed   Echo: Reviewed   Cath: Reviewed   ======================================================  PAST MEDICAL & SURGICAL HISTORY:  Hypertension      Diabetes      Lymphedema      H/O left bundle branch block      History of left bundle branch block (LBBB)      Systolic heart failure, chronic      Type 1 diabetes      H/O cataract  2020      History of surgical removal of meniscus of knee  left in 1971      Frozen shoulder  2000      H/O Achilles tendon repair  lengthened bilaterally, 2000      Fractured skull  1968    A/P: 73F PMH: HFrEF (EF 30%), AS, LBBB, HTN, DM1, CKD, hypothyroidism, chronic lymphedema, LELIA (3L home O2) p/w for syncope 2/2 severe AS, s/p TAVR 4/24. Course complicated by contrast induced allergic reaction (had CTA for TAVR eval) and contrast related renal failure (acute on chronic) requiring HD. TAVR c/b VT and vfib requiring shock and flash pulmonary edema causing AHRF requiring intubation. Currently extubated,  on CVVPHD for fluid removal, and on pressor support for vasoplegia and hypovolemia due to CRRT.    ====================== NEUROLOGY=====================  - Extubated 4/26  - Currently AOx4  - OOBTC, c/w PT as tolerated    ==================== RESPIRATORY======================  AHRF  2/2 flash pulmonary edema  LELIA (on 3L home oxygen)  - Extubated 4/26  - 4/27 CXR - no consolidation or pleural effusion  - intermittently requiring nasal cannula for comfort    ====================CARDIOVASCULAR==================  Shock state  -  vasoplegia from sedation vs losing fluid from CVVHD vs distributive septic shock  - on vasopressin 0.02  - f/u US for IVC collapsibility    VT/vfib s/p shock  2:1 AV block  Severe AS s/p TAVR  - Known LBBB with first degree AV block on telemetry  - s/p Micra 4/25, no longer requiring TVP (eventual candidate for CRT per EP)  - c/w ASA, atorvastatin     HFrEF  - EF 25%  - Holding GDMT while on pressors    ===================HEMATOLOGIC/ONC ===================  Anemia  -AARTI vs AOCD vs anemia 2/2 blood loss vs myelosuppression iso critical illness  -trend H&H, transfuse PRN    Thrombocytopenia  -  myelosuppression iso critical illness  - no active signs of bleeding, monitor plts, transfuse PRN    ===================== RENAL =========================  ESRD  - from ATN and contrast nephropathy  - c/w CRRT,  rate to180 -  250 cc/hr given persistent volume overload for one more day and switch to intermittent HD  - pressor support PRN for volume removal   - Strict I/Os  - Trend BMP, lytes  - Avoid nephrotoxins  - Nephro following  - f/u bladder scan after CVVHD has stopped    ==================== GASTROINTESTINAL===================  - tolerating  diet  - Bowel regimen with Miralax and Senna     =======================    ENDOCRINE  =====================  DM1  DKA    - on  basal/bolus lantus 32 QHS, admelog inc to 9u TIDAC   - monitor POCT FS, goal 140-180    Hypothyroidism  - c/w home levothyroxine     Cushing  L adrenal nodule  -dexamethasone suppression test w/ am cortisol - not suppressed, repeat 1mg DST   -repeat 24hr urine cortisol once not on CRRT, previous 1.2  -f/u MN salivary cortisol, dexamethasone level 374  - f/u CT adrenal nodule, suspicious for malignancy      ========================INFECTIOUS DISEASE================  - Monitor WBC and for fevers    Oral Candidiasis  - c/w nystatin     Acute generalized exanthematous pustulosis   - c/w calamine,  Vaseline    ===================== SKIN =========================  Psoriasis   Lymphedema   R. Diabetic foot ulcer  - Elevate legs  - Compression stockings, ACE wrapping  - Podiatry following- foot wounds management, decubiti precautions f/u outpt, check for iodine gel allergy      Patient requires continuous monitoring with bedside rhythm monitoring, pulse ox monitoring, and intermittent blood gas analysis. Care plan discussed with ICU care team. Patient remained critical and at risk for life threatening decompensation.  Patient seen, examined and plan discussed with CCU team during rounds.     I have personally provided 30 minutes of critical care time excluding time spent on separate procedures, in addition to initial critical care time provided by the CICU Attending, Dr. Sandoval     By signing my name below, I, Nisha Mccormick, attest that this documentation has been prepared under the direction and in the presence of Becka Horn NP   Electronically signed: Madison Villanueva, 05-01-24 @ 20:15    I, Becka Horn, personally performed the services described in this documentation. all medical record entries made by the scribe were at my direction and in my presence. I have reviewed the chart and agree that the record reflects my personal performance and is accurate and complete  Electronically signed: Becka Horn NP

## 2024-05-01 NOTE — PROGRESS NOTE ADULT - SUBJECTIVE AND OBJECTIVE BOX
[facility]   [phone]  RENAL FOLLOW UP NOTE  --------------------------------------------------------------------------------  Chief Complaint:    24 hour events/subjective:    seen earlier   on CRRT   states breathing is better     PAST HISTORY  --------------------------------------------------------------------------------  No significant changes to PMH, PSH, FHx, SHx, unless otherwise noted    ALLERGIES & MEDICATIONS  --------------------------------------------------------------------------------  Allergies    contrast media (iodine-based) (Fever; Vomiting; Flushing; Hypotension; Rash; Stomach Upset)  Ativan (Rash; Urticaria)  penicillins (Hives (Mod to Severe); Anaphylaxis (Mod to Severe); Short breath (Mod to Severe); Angioedema (Mod to Severe))    Intolerances      Standing Inpatient Medications  ascorbic acid 500 milliGRAM(s) Oral daily  aspirin  chewable 81 milliGRAM(s) Oral daily  atorvastatin 80 milliGRAM(s) Oral at bedtime  calamine/zinc oxide Lotion 1 Application(s) Topical three times a day  chlorhexidine 2% Cloths 1 Application(s) Topical daily  chlorhexidine 4% Liquid 1 Application(s) Topical <User Schedule>  CRRT Treatment    <Continuous>  epoetin mendoza-epbx (RETACRIT) Injectable 56563 Unit(s) IV Push <User Schedule>  ferrous    sulfate 325 milliGRAM(s) Oral two times a day  heparin   Injectable 5000 Unit(s) SubCutaneous every 8 hours  heparin  Infusion Syringe 300 Unit(s)/Hr CRRT <Continuous>  insulin glargine Injectable (LANTUS) 32 Unit(s) SubCutaneous at bedtime  insulin lispro (ADMELOG) corrective regimen sliding scale   SubCutaneous Before meals and at bedtime  insulin lispro Injectable (ADMELOG) 9 Unit(s) SubCutaneous three times a day before meals  levothyroxine 75 MICROGram(s) Oral daily  multivitamin 1 Tablet(s) Oral daily  mupirocin 2% Ointment 1 Application(s) Topical three times a day  nystatin    Suspension 255885 Unit(s) Oral four times a day  petrolatum white Ointment 1 Application(s) Topical three times a day  PrismaSATE Dialysate BGK 4 / 2.5 5000 milliLiter(s) CRRT <Continuous>  PrismaSOL Filtration BGK 4 / 2.5 5000 milliLiter(s) CRRT <Continuous>  PrismaSOL Filtration BGK 4 / 2.5 5000 milliLiter(s) CRRT <Continuous>  senna 2 Tablet(s) Oral at bedtime  vasopressin Infusion 0.02 Unit(s)/Min IV Continuous <Continuous>    PRN Inpatient Medications  polyethylene glycol 3350 17 Gram(s) Oral two times a day PRN      REVIEW OF SYSTEMS  --------------------------------------------------------------------------------    Gen: denies  fevers/chills,  CVS: denies chest pain/palpitations  Resp: denies SOB/Cough  GI: Denies N/V/Abd pain  : Denies dysuria    VITALS/PHYSICAL EXAM  --------------------------------------------------------------------------------  T(C): 36.9 (05-01-24 @ 19:00), Max: 37 (05-01-24 @ 03:00)  HR: 80 (05-01-24 @ 20:00) (74 - 104)  BP: --  RR: 23 (05-01-24 @ 20:00) (10 - 37)  SpO2: 100% (05-01-24 @ 20:00) (69% - 100%)  Wt(kg): --        04-30-24 @ 07:01  -  05-01-24 @ 07:00  --------------------------------------------------------  IN: 2343.4 mL / OUT: 4071 mL / NET: -1727.6 mL    05-01-24 @ 07:01  -  05-01-24 @ 21:19  --------------------------------------------------------  IN: 228.5 mL / OUT: 2484 mL / NET: -2255.5 mL      Physical Exam:  	    Gen:  on RA, facial erythema, neck and arm erythema  	Pulm: decrease bs  no rales or ronchi or wheezing  	CV: No JVD. RRR, S1S2; no rub II/VI M   	Abd: +BS, soft, nontender/nondistended, obese   	UE: Warm, no cyanosis  no clubbing,   +  edema  	LE: Warm, no cyanosis  no clubbing, 4+ softer  edema, B/L   	Neuro: alert and oriented               Vascular Access: +  non tunneled HD catheter    LABS/STUDIES  --------------------------------------------------------------------------------              8.1    10.45 >-----------<  145      [05-01-24 @ 16:35]              26.3     134  |  102  |  14  ----------------------------<  161      [05-01-24 @ 16:35]  4.5   |  21  |  1.38        Ca     8.2     [05-01-24 @ 16:35]      Mg     2.4     [05-01-24 @ 16:35]      Phos  2.3     [05-01-24 @ 16:35]    TPro  6.4  /  Alb  3.2  /  TBili  0.7  /  DBili  x   /  AST  16  /  ALT  11  /  AlkPhos  77  [05-01-24 @ 16:35]    PT/INR: PT 12.0 , INR 1.15       [05-01-24 @ 04:39]  PTT: 27.7       [05-01-24 @ 04:39]      Creatinine Trend:  SCr 1.38 [05-01 @ 16:35]  SCr 1.54 [05-01 @ 10:26]  SCr 1.61 [05-01 @ 01:11]  SCr 1.54 [04-30 @ 12:10]  SCr 1.31 [04-30 @ 00:33]    PTH -- (Ca 8.4)      [04-19-24 @ 17:54]   109  TSH 5.06      [04-14-24 @ 07:18]  Lipid: chol 74, TG 70, HDL 33, LDL --      [04-13-24 @ 07:05]

## 2024-05-01 NOTE — PROGRESS NOTE ADULT - ASSESSMENT
Assessment:  73F PMH: HFrEF (EF 30%), AS, LBBB, HTN, DM1, CKD, hypothyroidism, chronic lymphedema, LELIA (3L home O2) p/w for syncope 2/2 severe AS, s/p TAVR 4/24. Course complicated by contrast induced allergic reaction (had CTA for TAVR eval) and contrast related renal failure (acute on chronic) requiring HD. TAVR c/b VT and vfib requiring shock and flash pulmonary edema causing AHRF requiring intubation. Currently on CVVPHD for fluid overload, on pressor support for distributive septic shock.    NEURO:  - Extubated 4/26  - Currently AOx4  - OOBTC, c/w PT as tolerated    PULM  # AHRF  2/2 flash pulmonary edema  # LELIA (on 3L home oxygen)  - Extubated 4/26  - 4/27 CXR - no consolidation or pleural effusion  - intermittently requiring nasal cannula for comfort    CV  #Shock state  -  vasoplegia from sedation vs losing fluid from CVVHD vs distributive septic shock  - on vasopressin 0.05  - lactate cleared  - dc vancomycin , completed 5 days  - ID following- Bx neg  - f/u US for IVC collapsibility    #VT/vfib s/p shock  #2:1 AV block  #Severe AS s/p TAVR  - Known LBBB with first degree AV block on telemetry  - s/p Micra 4/25, no longer requiring TVP (eventual candidate for CRT per EP)  - c/w ASA, atorvastatin     # HFrEF  - EF 25%  - Holding GDMT while on pressors    /RENAL  #ESRD  - from ATN and contrast nephropathy  - c/w CRRT, increase rate to 275 cc/hr given persistent volume overload for one more day and switch to intermittent HD  - pressor support PRN for volume removal   - Strict I/Os  - Trend BMP, lytes  - Avoid nephrotoxins  - Nephro following  - f/u bladder scan after CVVHD has stopped    GI  - tolerating  diet    ENDO  # DM1  # DKA    - on  basal/bolus lantus 25QHS, admelog dec to 5u TIDAC   - monitor POCT FS, goal 140-180    # Hypothyroidism  - c/w home levothyroxine     #Cushing  # L adrenal nodule  -dexamethasone suppression test w/ am cortisol - not suppressed  -repeat 24hr urine cortisol once not on CRRT, previous 1.2  -f/u MN salivary cortisol, dexamethasone level 374    SKIN  # Acute generalized exanthematous pustulosis   - c/w calamine,  Vaseline    # Psoriasis   # Lymphedema   # R. Diabetic foot ulcer  - Elevate legs  - Compression stockings, ACE wrapping  - Podiatry following- foot wounds management, decubiti precautions f/u outpt, check for iodine gel allergy    ID  - Monitor WBC and for fevers  - pos for staph E. bacteremia (likely contaminant 1 out of 4 bottles)  - dc Vancomycin , 5 days completed  - ID following    # Oral Candidiasis  - c/w nystatin     Heme  #Anemia  -AARTI vs AOCD vs anemia 2/2 blood loss vs myelosuppression iso critical illness  -trend H&H, transfuse PRN    #thrombocytopenia  -  myelosuppression iso critical illness  - no active signs of bleeding, monitor plts, transfuse PRN      Mobilize out of bed once off CRRT and pressors.     Patient requires continuous monitoring with bedside rhythm monitoring, pulse ox monitoring, and intermittent blood gas analysis. Care plan discussed with ICU care team. Patient remained critical and at risk for life threatening decompensation.  Patient seen, examined and plan discussed with CCU team during rounds.    Assessment:  73F PMH: HFrEF (EF 30%), AS, LBBB, HTN, DM1, CKD, hypothyroidism, chronic lymphedema, LELIA (3L home O2) p/w for syncope 2/2 severe AS, s/p TAVR 4/24. Course complicated by contrast induced allergic reaction (had CTA for TAVR eval) and contrast related renal failure (acute on chronic) requiring HD. TAVR c/b VT and vfib requiring shock and flash pulmonary edema causing AHRF requiring intubation. Currently extubated,  on CVVPHD for fluid overload, and on pressor support for distributive septic shock.    NEURO:  - Extubated 4/26  - Currently AOx4  - OOBTC, c/w PT as tolerated    PULM  # AHRF  2/2 flash pulmonary edema  # LELIA (on 3L home oxygen)  - Extubated 4/26  - 4/27 CXR - no consolidation or pleural effusion  - intermittently requiring nasal cannula for comfort    CV  #Shock state  -  vasoplegia from sedation vs losing fluid from CVVHD vs distributive septic shock  - on vasopressin 0.02  - f/u US for IVC collapsibility    #VT/vfib s/p shock  #2:1 AV block  #Severe AS s/p TAVR  - Known LBBB with first degree AV block on telemetry  - s/p Micra 4/25, no longer requiring TVP (eventual candidate for CRT per EP)  - c/w ASA, atorvastatin     # HFrEF  - EF 25%  - Holding GDMT while on pressors    /RENAL  #ESRD  - from ATN and contrast nephropathy  - c/w CRRT,  rate to180 -  250 cc/hr given persistent volume overload for one more day and switch to intermittent HD  - pressor support PRN for volume removal   - Strict I/Os  - Trend BMP, lytes  - Avoid nephrotoxins  - Nephro following  - f/u bladder scan after CVVHD has stopped    GI  - tolerating  diet    ENDO  # DM1  # DKA    - on  basal/bolus lantus 32 QHS, admelog inc to 9u TIDAC   - monitor POCT FS, goal 140-180    # Hypothyroidism  - c/w home levothyroxine     #Cushing  # L adrenal nodule  -dexamethasone suppression test w/ am cortisol - not suppressed, repeat 1mg DST   -repeat 24hr urine cortisol once not on CRRT, previous 1.2  -f/u MN salivary cortisol, dexamethasone level 374  - f/u CT adrenal nodule, suspicious for malignancy    SKIN  # Acute generalized exanthematous pustulosis   - c/w calamine,  Vaseline    # Psoriasis   # Lymphedema   # R. Diabetic foot ulcer  - Elevate legs  - Compression stockings, ACE wrapping  - Podiatry following- foot wounds management, decubiti precautions f/u outpt, check for iodine gel allergy    ID  - Monitor WBC and for fevers    # Oral Candidiasis  - c/w nystatin     Heme  #Anemia  -AARTI vs AOCD vs anemia 2/2 blood loss vs myelosuppression iso critical illness  -trend H&H, transfuse PRN    #thrombocytopenia  -  myelosuppression iso critical illness  - no active signs of bleeding, monitor plts, transfuse PRN      Mobilize out of bed once off CRRT   Patient requires continuous monitoring with bedside rhythm monitoring, pulse ox monitoring, and intermittent blood gas analysis. Care plan discussed with ICU care team. Patient remained critical and at risk for life threatening decompensation.  Patient seen, examined and plan discussed with CCU team during rounds.    Assessment:  73F PMH: HFrEF (EF 30%), AS, LBBB, HTN, DM1, CKD, hypothyroidism, chronic lymphedema, LELIA (3L home O2) p/w for syncope 2/2 severe AS, s/p TAVR 4/24. Course complicated by contrast induced allergic reaction (had CTA for TAVR eval) and contrast related renal failure (acute on chronic) requiring HD. TAVR c/b VT and vfib requiring shock and flash pulmonary edema causing AHRF requiring intubation. Currently extubated,  on CVVPHD for fluid removal, and on pressor support for vasoplegia and hypovolemia due to CRRT.    NEURO:  - Extubated 4/26  - Currently AOx4  - OOBTC, c/w PT as tolerated    PULM  # AHRF  2/2 flash pulmonary edema  # LELIA (on 3L home oxygen)  - Extubated 4/26  - 4/27 CXR - no consolidation or pleural effusion  - intermittently requiring nasal cannula for comfort    CV  #Shock state  -  vasoplegia from sedation vs losing fluid from CVVHD vs distributive septic shock  - on vasopressin 0.02  - f/u US for IVC collapsibility    #VT/vfib s/p shock  #2:1 AV block  #Severe AS s/p TAVR  - Known LBBB with first degree AV block on telemetry  - s/p Micra 4/25, no longer requiring TVP (eventual candidate for CRT per EP)  - c/w ASA, atorvastatin     # HFrEF  - EF 25%  - Holding GDMT while on pressors    /RENAL  #ESRD  - from ATN and contrast nephropathy  - c/w CRRT,  rate to180 -  250 cc/hr given persistent volume overload for one more day and switch to intermittent HD  - pressor support PRN for volume removal   - Strict I/Os  - Trend BMP, lytes  - Avoid nephrotoxins  - Nephro following  - f/u bladder scan after CVVHD has stopped    GI  - tolerating  diet    ENDO  # DM1  # DKA    - on  basal/bolus lantus 32 QHS, admelog inc to 9u TIDAC   - monitor POCT FS, goal 140-180    # Hypothyroidism  - c/w home levothyroxine     #Cushing  # L adrenal nodule  -dexamethasone suppression test w/ am cortisol - not suppressed, repeat 1mg DST   -repeat 24hr urine cortisol once not on CRRT, previous 1.2  -f/u MN salivary cortisol, dexamethasone level 374  - f/u CT adrenal nodule, suspicious for malignancy    SKIN  # Acute generalized exanthematous pustulosis   - c/w calamine,  Vaseline    # Psoriasis   # Lymphedema   # R. Diabetic foot ulcer  - Elevate legs  - Compression stockings, ACE wrapping  - Podiatry following- foot wounds management, decubiti precautions f/u outpt, check for iodine gel allergy    ID  - Monitor WBC and for fevers    # Oral Candidiasis  - c/w nystatin     Heme  #Anemia  -AARTI vs AOCD vs anemia 2/2 blood loss vs myelosuppression iso critical illness  -trend H&H, transfuse PRN    #thrombocytopenia  -  myelosuppression iso critical illness  - no active signs of bleeding, monitor plts, transfuse PRN      Mobilize out of bed once off CRRT   Patient requires continuous monitoring with bedside rhythm monitoring, pulse ox monitoring, and intermittent blood gas analysis. Care plan discussed with ICU care team. Patient remained critical and at risk for life threatening decompensation.  Patient seen, examined and plan discussed with CCU team during rounds.

## 2024-05-01 NOTE — PROGRESS NOTE ADULT - SUBJECTIVE AND OBJECTIVE BOX
[facility] - Howard Kimble, Cherise, Carlos, Alecia, Herman Hernández  Office Number:  [phone]    Patient resting comfortably in bed in NAD.  Laying flat with no respiratory distress.  No complaints of chest pain, dyspnea, palpitations, PND, or orthopnea.  Still onl ow dose vaso    F/U for:  AS        MEDICATIONS  (STANDING):  ascorbic acid 500 milliGRAM(s) Oral daily  aspirin  chewable 81 milliGRAM(s) Oral daily  atorvastatin 80 milliGRAM(s) Oral at bedtime  calamine/zinc oxide Lotion 1 Application(s) Topical three times a day  chlorhexidine 2% Cloths 1 Application(s) Topical daily  chlorhexidine 4% Liquid 1 Application(s) Topical <User Schedule>  CRRT Treatment    <Continuous>  epoetin mendoaz-epbx (RETACRIT) Injectable 01397 Unit(s) IV Push <User Schedule>  ferrous    sulfate 325 milliGRAM(s) Oral two times a day  heparin   Injectable 5000 Unit(s) SubCutaneous every 8 hours  heparin  Infusion Syringe 300 Unit(s)/Hr (0.6 mL/Hr) CRRT <Continuous>  insulin glargine Injectable (LANTUS) 26 Unit(s) SubCutaneous at bedtime  insulin lispro (ADMELOG) corrective regimen sliding scale   SubCutaneous Before meals and at bedtime  insulin lispro Injectable (ADMELOG) 7 Unit(s) SubCutaneous three times a day before meals  levothyroxine 75 MICROGram(s) Oral daily  multivitamin 1 Tablet(s) Oral daily  mupirocin 2% Ointment 1 Application(s) Topical three times a day  nystatin    Suspension 696031 Unit(s) Oral four times a day  petrolatum white Ointment 1 Application(s) Topical three times a day  PrismaSATE Dialysate BGK 4 / 2.5 5000 milliLiter(s) (1400 mL/Hr) CRRT <Continuous>  PrismaSOL Filtration BGK 4 / 2.5 5000 milliLiter(s) (300 mL/Hr) CRRT <Continuous>  PrismaSOL Filtration BGK 4 / 2.5 5000 milliLiter(s) (1000 mL/Hr) CRRT <Continuous>  senna 2 Tablet(s) Oral at bedtime  vasopressin Infusion 0.02 Unit(s)/Min (3 mL/Hr) IV Continuous <Continuous>    MEDICATIONS  (PRN):  polyethylene glycol 3350 17 Gram(s) Oral two times a day PRN Constipation      Allergies    contrast media (iodine-based) (Fever; Vomiting; Flushing; Hypotension; Rash; Stomach Upset)  Ativan (Rash; Urticaria)  penicillins (Hives (Mod to Severe); Anaphylaxis (Mod to Severe); Short breath (Mod to Severe); Angioedema (Mod to Severe))    Intolerances        Vital Signs Last 24 Hrs  T(C): 37 (01 May 2024 03:00), Max: 37 (01 May 2024 03:00)  T(F): 98.6 (01 May 2024 03:00), Max: 98.6 (01 May 2024 03:00)  HR: 104 (01 May 2024 07:30) (76 - 121)  BP: --  BP(mean): --  RR: 18 (01 May 2024 07:30) (10 - 32)  SpO2: 97% (01 May 2024 07:30) (89% - 100%)    Parameters below as of 01 May 2024 07:30  Patient On (Oxygen Delivery Method): room air        I&O's Summary    30 Apr 2024 07:01  -  01 May 2024 07:00  --------------------------------------------------------  IN: 2343.4 mL / OUT: 4071 mL / NET: -1727.6 mL        ON EXAM:  General: NAD, awake and alert, oriented x 3  HEENT: Mucous membranes are moist, anicteric  Lungs: Non-labored, breath sounds are clear bilaterally, No wheezing, rales or rhonchi.  Decreased bs bl  Cardiovascular: Regular, S1 and S2, 2/6 systolic murmur  Gastrointestinal: Bowel Sounds present, soft, nontender.  Obese  Lymph: b/l chronic  peripheral lymphedema. No lymphadenopathy.  Psych:  Mood & affect appropriate      LABS: All Labs Reviewed:                        7.3    10.61 )-----------( 142      ( 01 May 2024 01:11 )             24.2                         7.8    15.35 )-----------( 95       ( 30 Apr 2024 00:31 )             25.3                         7.8    13.41 )-----------( 69       ( 29 Apr 2024 01:04 )             25.5     01 May 2024 01:11    132    |  101    |  16     ----------------------------<  261    4.1     |  21     |  1.61   30 Apr 2024 12:10    132    |  99     |  17     ----------------------------<  265    4.2     |  23     |  1.54   30 Apr 2024 00:33    131    |  99     |  12     ----------------------------<  225    4.4     |  22     |  1.31     Ca    7.9        01 May 2024 01:11  Ca    7.8        30 Apr 2024 12:10  Ca    7.5        30 Apr 2024 00:33  Phos  2.7       01 May 2024 01:11  Phos  3.0       30 Apr 2024 12:10  Phos  1.5       30 Apr 2024 00:33  Mg     2.2       01 May 2024 01:11  Mg     2.3       30 Apr 2024 12:10  Mg     2.2       30 Apr 2024 00:33    TPro  5.8    /  Alb  2.8    /  TBili  0.5    /  DBili  x      /  AST  15     /  ALT  12     /  AlkPhos  72     01 May 2024 01:11  TPro  6.1    /  Alb  2.9    /  TBili  0.7    /  DBili  x      /  AST  16     /  ALT  10     /  AlkPhos  76     30 Apr 2024 12:10  TPro  5.7    /  Alb  3.0    /  TBili  0.7    /  DBili  x      /  AST  16     /  ALT  12     /  AlkPhos  74     30 Apr 2024 00:33    PT/INR - ( 01 May 2024 04:39 )   PT: 12.0 sec;   INR: 1.15 ratio         PTT - ( 01 May 2024 04:39 )  PTT:27.7 sec      Blood Culture: Organism --  Gram Stain Blood -- Gram Stain --  Specimen Source .Blood Blood  Culture-Blood --    Organism --  Gram Stain Blood -- Gram Stain --  Specimen Source .Blood Blood  Culture-Blood --

## 2024-05-02 NOTE — PROGRESS NOTE ADULT - SUBJECTIVE AND OBJECTIVE BOX
*****Structural Heart Team*****    Subjective:    Ms. Chowdhury is resting in bed, stating she feels ok. She was walking with PT earlier when her legs gave out and she had to be guided to the floor.       PAST MEDICAL & SURGICAL HISTORY:  Hypertension    Diabetes    Lymphedema    H/O left bundle branch block    History of left bundle branch block (LBBB)    Systolic heart failure, chronic    Type 1 diabetes    H/O cataract  2020    History of surgical removal of meniscus of knee  left in 1971    Frozen shoulder  2000    H/O Achilles tendon repair  lengthened bilaterally, 2000    Fractured skull  1968          T(C): 36.7 (05-02-24 @ 07:00), Max: 37.2 (05-02-24 @ 03:00)  HR: 84 (05-02-24 @ 14:00) (76 - 116)  BP: --  RR: 21 (05-02-24 @ 14:00) (16 - 36)  SpO2: 98% (05-02-24 @ 14:00) (90% - 100%)  Wt(kg): --  05-01 @ 07:01  -  05-02 @ 07:00  --------------------------------------------------------  IN: 332 mL / OUT: 4960 mL / NET: -4628 mL    05-02 @ 07:01  -  05-02 @ 15:43  --------------------------------------------------------  IN: 730 mL / OUT: 1472 mL / NET: -742 mL      MEDICATIONS  (STANDING):  ascorbic acid 500 milliGRAM(s) Oral daily  aspirin  chewable 81 milliGRAM(s) Oral daily  atorvastatin 80 milliGRAM(s) Oral at bedtime  calamine/zinc oxide Lotion 1 Application(s) Topical three times a day  CRRT Treatment    <Continuous>  epoetin mendoza-epbx (RETACRIT) Injectable 56278 Unit(s) IV Push <User Schedule>  ferrous    sulfate 325 milliGRAM(s) Oral two times a day  heparin   Injectable 5000 Unit(s) SubCutaneous every 8 hours  heparin  Infusion Syringe 300 Unit(s)/Hr (0.6 mL/Hr) CRRT <Continuous>  insulin glargine Injectable (LANTUS) 32 Unit(s) SubCutaneous at bedtime  insulin lispro (ADMELOG) corrective regimen sliding scale   SubCutaneous Before meals and at bedtime  insulin lispro Injectable (ADMELOG) 9 Unit(s) SubCutaneous three times a day before meals  levothyroxine 75 MICROGram(s) Oral daily  multivitamin 1 Tablet(s) Oral daily  mupirocin 2% Ointment 1 Application(s) Topical three times a day  nystatin    Suspension 506115 Unit(s) Oral four times a day  petrolatum white Ointment 1 Application(s) Topical three times a day  PrismaSATE Dialysate BGK 4 / 2.5 5000 milliLiter(s) (1400 mL/Hr) CRRT <Continuous>  PrismaSOL Filtration BGK 4 / 2.5 5000 milliLiter(s) (1000 mL/Hr) CRRT <Continuous>  PrismaSOL Filtration BGK 4 / 2.5 5000 milliLiter(s) (300 mL/Hr) CRRT <Continuous>  senna 2 Tablet(s) Oral at bedtime  vasopressin Infusion 0.02 Unit(s)/Min (3 mL/Hr) IV Continuous <Continuous>    MEDICATIONS  (PRN):  polyethylene glycol 3350 17 Gram(s) Oral two times a day PRN Constipation      Review of Symptoms:  Constitutional: Awake, Alert, Follows commands  Respiratory: + CURTIS,   Cardiac: Denies CP, Denies Palpitations  Gastrointestinal: Denies Pain, Denies N/V, tolerating po intake  Vascular: Negative  Extremities: + Edema, No joint pain or swelling  Neurological: Negative  Endocrine: No heat or cold intolerance, No excessive thirst  Heme/Onc: Negative    Exam:  General: A/Ox3, DOMINIC, NAD  HEENT: Supple, No JVD, Trachea midline, no masses  Pulmonary: CTAB, = Chest Excursion, no accessory muscle use  Cor: S1S2, RRR, No murmur noted.  ECG: SR  Groin/Wound: Soft B/L no hematoma  Gastrointestinal: Soft, NT/ND, + Bowel Sounds  Neuro: = motor and sensory B/L, No focal deficits  Vascular: 1+ Pulses, 2+ Edema  Extremities: No Joint pain or swelling  Skin: Warm/Dry, erythema getting better                          7.9    13.06 )-----------( 149      ( 02 May 2024 00:16 )             26.4   05-02    134<L>  |  102  |  13  ----------------------------<  131<H>  4.4   |  21<L>  |  1.21    Ca    8.1<L>      02 May 2024 06:50  Phos  2.1     05-02  Mg     2.3     05-02    TPro  6.2  /  Alb  3.0<L>  /  TBili  0.8  /  DBili  x   /  AST  18  /  ALT  11  /  AlkPhos  75  05-02  PT/INR - ( 01 May 2024 04:39 )   PT: 12.0 sec;   INR: 1.15 ratio         PTT - ( 01 May 2024 04:39 )  PTT:27.7 sec    Imaging Reviewed:    Echocardiogram:    Cardiac Catheterization:        Assesment/Plan:  74 y/o female s/p TAVR for severe Aortic Stenosis, Acute on Chronic HFrEF, Acute on Chronic Kidney disease  1.) S/P TAVR: ASA 81 mg po daily, Continue to Monitor Groins. Ambulate as tolerated, Post op TTE looks good  2.) Skin Rash: Treatment per Dermatology. Patient does not appear as red as she did last week, and she states she feels better  3.) Acute on Chronic Kidney Disease: Patient no longer on CVVH, will start HD as per Renal team  4.) Acute on Chronic HFrEF: Continue with GDMT, monitor daily weight, monitor I/O's  5.) Continue to ambulate as tolerated with PT. Bedside exercises as tolerated.    D[place]ed [place]  [id number]

## 2024-05-02 NOTE — PROGRESS NOTE ADULT - SUBJECTIVE AND OBJECTIVE BOX
[facility]   [phone]  RENAL FOLLOW UP NOTE  --------------------------------------------------------------------------------  Chief Complaint:    24 hour events/subjective:    seen earlier   on CRRT  states breathing is improving     PAST HISTORY  --------------------------------------------------------------------------------  No significant changes to PMH, PSH, FHx, SHx, unless otherwise noted    ALLERGIES & MEDICATIONS  --------------------------------------------------------------------------------  Allergies    contrast media (iodine-based) (Fever; Vomiting; Flushing; Hypotension; Rash; Stomach Upset)  Ativan (Rash; Urticaria)  penicillins (Hives (Mod to Severe); Anaphylaxis (Mod to Severe); Short breath (Mod to Severe); Angioedema (Mod to Severe))    Intolerances      Standing Inpatient Medications  ascorbic acid 500 milliGRAM(s) Oral daily  aspirin  chewable 81 milliGRAM(s) Oral daily  atorvastatin 80 milliGRAM(s) Oral at bedtime  calamine/zinc oxide Lotion 1 Application(s) Topical three times a day  chlorhexidine 2% Cloths 1 Application(s) Topical daily  chlorhexidine 4% Liquid 1 Application(s) Topical <User Schedule>  CRRT Treatment    <Continuous>  epoetin mendoza-epbx (RETACRIT) Injectable 30387 Unit(s) IV Push <User Schedule>  ferrous    sulfate 325 milliGRAM(s) Oral two times a day  heparin   Injectable 5000 Unit(s) SubCutaneous every 8 hours  heparin  Infusion Syringe 300 Unit(s)/Hr CRRT <Continuous>  insulin glargine Injectable (LANTUS) 32 Unit(s) SubCutaneous at bedtime  insulin lispro (ADMELOG) corrective regimen sliding scale   SubCutaneous Before meals and at bedtime  insulin lispro Injectable (ADMELOG) 8 Unit(s) SubCutaneous three times a day before meals  levothyroxine 75 MICROGram(s) Oral daily  multivitamin 1 Tablet(s) Oral daily  mupirocin 2% Ointment 1 Application(s) Topical three times a day  nystatin    Suspension 861333 Unit(s) Oral four times a day  petrolatum white Ointment 1 Application(s) Topical three times a day  PrismaSATE Dialysate BGK 4 / 2.5 5000 milliLiter(s) CRRT <Continuous>  PrismaSOL Filtration BGK 4 / 2.5 5000 milliLiter(s) CRRT <Continuous>  PrismaSOL Filtration BGK 4 / 2.5 5000 milliLiter(s) CRRT <Continuous>  senna 2 Tablet(s) Oral at bedtime  vasopressin Infusion 0.02 Unit(s)/Min IV Continuous <Continuous>    PRN Inpatient Medications  polyethylene glycol 3350 17 Gram(s) Oral two times a day PRN      REVIEW OF SYSTEMS  --------------------------------------------------------------------------------    Gen: denies  fevers/chills,  CVS: denies chest pain/palpitations  Resp: denies SOB/Cough  GI: Denies N/V/Abd pain  : Denies dysuria      VITALS/PHYSICAL EXAM  --------------------------------------------------------------------------------  T(C): 36.9 (05-02-24 @ 19:00), Max: 37.2 (05-02-24 @ 03:00)  HR: 102 (05-02-24 @ 20:15) (84 - 116)  BP: --  RR: 17 (05-02-24 @ 20:15) (8 - 36)  SpO2: 96% (05-02-24 @ 20:15) (90% - 100%)  Wt(kg): --        05-01-24 @ 07:01  -  05-02-24 @ 07:00  --------------------------------------------------------  IN: 332 mL / OUT: 4960 mL / NET: -4628 mL    05-02-24 @ 07:01  -  05-02-24 @ 20:25  --------------------------------------------------------  IN: 1258 mL / OUT: 1702 mL / NET: -444 mL      Physical Exam:  	    Gen:  on RA, facial erythema, neck and arm erythema  	Pulm: decrease bs  no rales or ronchi or wheezing  	CV: No JVD. RRR, S1S2; no rub II/VI M   	Abd: +BS, soft, nontender/nondistended, obese   	UE: Warm, no cyanosis  no clubbing,   +  edema  	LE: Warm, no cyanosis  no clubbing, 3+ softer  edema, B/L   	Neuro: alert and oriented               Vascular Access: +  non tunneled HD catheter    LABS/STUDIES  --------------------------------------------------------------------------------              7.9    13.06 >-----------<  149      [05-02-24 @ 00:16]              26.4     132  |  101  |  15  ----------------------------<  85      [05-02-24 @ 18:53]  4.5   |  20  |  1.48        Ca     8.1     [05-02-24 @ 18:53]      Mg     2.3     [05-02-24 @ 18:53]      Phos  3.5     [05-02-24 @ 18:53]    TPro  6.2  /  Alb  2.9  /  TBili  0.6  /  DBili  x   /  AST  21  /  ALT  12  /  AlkPhos  72  [05-02-24 @ 18:53]    PT/INR: PT 12.0 , INR 1.15       [05-01-24 @ 04:39]  PTT: 27.7       [05-01-24 @ 04:39]      Creatinine Trend:  SCr 1.48 [05-02 @ 18:53]  SCr 1.21 [05-02 @ 06:50]  SCr 1.28 [05-02 @ 00:16]  SCr 1.38 [05-01 @ 16:35]  SCr 1.54 [05-01 @ 10:26]        PTH -- (Ca 8.4)      [04-19-24 @ 17:54]   109  TSH 5.06      [04-14-24 @ 07:18]  Lipid: chol 74, TG 70, HDL 33, LDL --      [04-13-24 @ 07:05]

## 2024-05-02 NOTE — PROGRESS NOTE ADULT - SUBJECTIVE AND OBJECTIVE BOX
EVELYN LOPEZ  MRN-[id number]  Patient is a 73y old  Female who presents with a chief complaint of Syncope (02 May 2024 20:25)    HPI:  73F w HFrEF (EF 30%), mod AS, known LBBB, HTN, IDDM1, CKD, hypothyroidism, chronic lymphedema, suspected OHS/LELIA on 3L NC home O2 p/w 1 episode of syncope. Recent admission at [facility] (3/29-4/5) for ADHF and DUNCAN s/p diuresis, discharged with new home O2 3L NC suspecting OHS/LELIA pending outpatient w/u. Since discharge a week ago, she has been staying at home with   Pt had sob walking to car. Once in car, she was still breathing heavily. Partner noticed pt was not responding to verbal stimuli for 10-15sec and witnessed R arm jerking. Pt gained consciousness quickly without postictal symptoms. Pt went to Cardiologist Dr. Nathan who recommended pt to come to ED. No fever, cp, abd pain, n/v. Leg swelling is improved from prior. Pt on torsemide 40mg which she has been taking. Pt was discharged on 3LNC which she is currently on. Patient reports she did not take Farxiga that she was discharged on as she was concerned about side effects.     In ED, patient was found afebrile, hemodynamically stable, breathing well on 3L NC. Initial labs notable for mild hyponatremia, DUNCAN on CKD, elevated proBNP and elevated pCO2 both improved from prior. (12 Apr 2024 16:31)      Hospital Course:    24 HOUR EVENTS:    REVIEW OF SYSTEMS:    CONSTITUTIONAL: No weakness, fevers or chills  EYES/ENT: No visual changes;  No vertigo or throat pain   NECK: No pain or stiffness  RESPIRATORY: No cough, wheezing, hemoptysis; No shortness of breath  CARDIOVASCULAR: No chest pain or palpitations  GASTROINTESTINAL: No abdominal or epigastric pain. No nausea, vomiting, or hematemesis; No diarrhea or constipation. No melena or hematochezia.  GENITOURINARY: No dysuria, frequency or hematuria  NEUROLOGICAL: No numbness or weakness  SKIN: No itching, rashes      ICU Vital Signs Last 24 Hrs  T(C): 36.9 (02 May 2024 19:00), Max: 37.2 (02 May 2024 03:00)  T(F): 98.4 (02 May 2024 19:00), Max: 98.9 (02 May 2024 03:00)  HR: 107 (02 May 2024 22:15) (84 - 116)  BP: --  BP(mean): --  ABP: 96/45 (02 May 2024 22:15) (77/37 - 126/49)  ABP(mean): 64 (02 May 2024 22:15) (50 - 77)  RR: 17 (02 May 2024 22:15) (8 - 36)  SpO2: 98% (02 May 2024 22:15) (90% - 100%)    O2 Parameters below as of 02 May 2024 22:00  Patient On (Oxygen Delivery Method): room air            CVP(mm Hg): --  CO: --  CI: --  PA: --  PA(mean): --  PA(direct): --  PCWP: --  LA: --  RA: --  SVR: --  SVRI: --  PVR: --  PVRI: --    POCT Blood Glucose.: 85 mg/dL (05-02-24 @ 22:06)  POCT Blood Glucose.: 75 mg/dL (05-02-24 @ 22:04)  POCT Blood Glucose.: 102 mg/dL (05-02-24 @ 16:57)  POCT Blood Glucose.: 99 mg/dL (05-02-24 @ 12:09)  POCT Blood Glucose.: 140 mg/dL (05-02-24 @ 06:28)    CAPILLARY BLOOD GLUCOSE      POCT Blood Glucose.: 85 mg/dL (02 May 2024 22:06)      PHYSICAL EXAM:  GENERAL: No acute distress, well-developed  HEAD:  Atraumatic, Normocephalic  EYES: EOMI, PERRLA, conjunctiva and sclera clear  NECK: Supple, no lymphadenopathy, no JVD  CHEST/LUNG: CTAB; No wheezes, rales, or rhonchi  HEART: Regular rate and rhythm. Normal S1/S2. No murmurs, rubs, or gallops  ABDOMEN: Soft, non-tender, non-distended; normal bowel sounds, no organomegaly  EXTREMITIES:  2+ peripheral pulses b/l, No clubbing, cyanosis, or edema  NEUROLOGY: A&O x 3, no focal deficits  SKIN: No rashes or lesions    ============================I/O===========================   I&O's Detail    01 May 2024 07:01  -  02 May 2024 07:00  --------------------------------------------------------  IN:    IV PiggyBack: 85 mL    Oral Fluid: 200 mL    Vasopressin: 47 mL  Total IN: 332 mL    OUT:    Indwelling Catheter - Urethral (mL): 640 mL    Other (mL): 4320 mL  Total OUT: 4960 mL    Total NET: -4628 mL      02 May 2024 07:01  -  02 May 2024 22:47  --------------------------------------------------------  IN:    IV PiggyBack: 50 mL    IV PiggyBack: 425 mL    Oral Fluid: 720 mL    Vasopressin: 93 mL  Total IN: 1288 mL    OUT:    Indwelling Catheter - Urethral (mL): 315 mL    Other (mL): 1437 mL  Total OUT: 1752 mL    Total NET: -464 mL        ============================ LABS =========================                        7.9    13.06 )-----------( 149      ( 02 May 2024 00:16 )             26.4     05-02    132<L>  |  101  |  15  ----------------------------<  85  4.5   |  20<L>  |  1.48<H>    Ca    8.1<L>      02 May 2024 18:53  Phos  3.5     05-02  Mg     2.3     05-02    TPro  6.2  /  Alb  2.9<L>  /  TBili  0.6  /  DBili  x   /  AST  21  /  ALT  12  /  AlkPhos  72  05-02                LIVER FUNCTIONS - ( 02 May 2024 18:53 )  Alb: 2.9 g/dL / Pro: 6.2 g/dL / ALK PHOS: 72 U/L / ALT: 12 U/L / AST: 21 U/L / GGT: x           PT/INR - ( 01 May 2024 04:39 )   PT: 12.0 sec;   INR: 1.15 ratio         PTT - ( 01 May 2024 04:39 )  PTT:27.7 sec  ABG - ( 01 May 2024 01:08 )  pH, Arterial: 7.39  pH, Blood: x     /  pCO2: 41    /  pO2: 91    / HCO3: 25    / Base Excess: -0.2  /  SaO2: 98.6                Urinalysis Basic - ( 02 May 2024 18:53 )    Color: x / Appearance: x / SG: x / pH: x  Gluc: 85 mg/dL / Ketone: x  / Bili: x / Urobili: x   Blood: x / Protein: x / Nitrite: x   Leuk Esterase: x / RBC: x / WBC x   Sq Epi: x / Non Sq Epi: x / Bacteria: x      ======================Micro/Rad/Cardio=================  Telemtry: Reviewed   EKG: Reviewed  CXR: Reviewed  Culture: Reviewed   Echo:   Cath:   ======================================================  PAST MEDICAL & SURGICAL HISTORY:  Hypertension      Diabetes      Lymphedema      H/O left bundle branch block      History of left bundle branch block (LBBB)      Systolic heart failure, chronic      Type 1 diabetes      H/O cataract  2020      History of surgical removal of meniscus of knee  left in 1971      Frozen shoulder  2000      H/O Achilles tendon repair  lengthened bilaterally, 2000      Fractured skull  1968        ====================ASSESSMENT ==============  73F PMH: HFrEF (EF 30%), AS, LBBB, HTN, DM1, CKD, hypothyroidism, chronic lymphedema, LELIA (3L home O2) p/w for syncope 2/2 severe AS, s/p TAVR 4/24. Course complicated by contrast induced allergic reaction (had CTA for TAVR eval) and contrast related renal failure (acute on chronic) requiring HD. TAVR c/b VT and vfib requiring shock and flash pulmonary edema causing AHRF requiring intubation. Currently extubated,  on CVVPHD for fluid removal, and on pressor support for vasoplegia and hypovolemia due to CRRT.    ====================== NEUROLOGY=====================  - Extubated 4/26  - Currently AOx4  - OOBTC, c/w PT as tolerated    ==================== RESPIRATORY======================  AHRF  2/2 flash pulmonary edema  LELIA (on 3L home oxygen)  - Extubated 4/26  - 4/27 CXR - no consolidation or pleural effusion  - intermittently requiring nasal cannula for comfort    ====================CARDIOVASCULAR==================  Shock state  -  vasoplegia from sedation vs losing fluid from CVVHD vs distributive septic shock  - on vasopressin 0.02  - f/u US for IVC collapsibility    VT/vfib s/p shock  2:1 AV block  Severe AS s/p TAVR  - Known LBBB with first degree AV block on telemetry  - s/p Micra 4/25, no longer requiring TVP (eventual candidate for CRT per EP)  - c/w ASA, atorvastatin     HFrEF  - EF 25%  - Holding GDMT while on pressors    ===================HEMATOLOGIC/ONC ===================  Anemia  -AARTI vs AOCD vs anemia 2/2 blood loss vs myelosuppression iso critical illness  -trend H&H, transfuse PRN    Thrombocytopenia  -  myelosuppression iso critical illness  - no active signs of bleeding, monitor plts, transfuse PRN    ===================== RENAL =========================  ESRD  - from ATN and contrast nephropathy  - c/w CRRT,  rate to180 -  250 cc/hr given persistent volume overload for one more day and switch to intermittent HD  - pressor support PRN for volume removal   - Strict I/Os  - Trend BMP, lytes  - Avoid nephrotoxins  - Nephro following  - f/u bladder scan after CVVHD has stopped    ==================== GASTROINTESTINAL===================  - tolerating  diet  - Bowel regimen with Miralax and Senna     =======================    ENDOCRINE  =====================  DM1  DKA    - on  basal/bolus lantus 32 QHS, admelog lowered to 6u TIDAC due to low FS tonight  - monitor POCT FS, goal 140-180    Hypothyroidism  - c/w home levothyroxine     Cushing  L adrenal nodule  -dexamethasone suppression test w/ am cortisol - not suppressed, repeat 1mg DST   -repeat 24hr urine cortisol once not on CRRT, previous 1.2  -f/u MN salivary cortisol, dexamethasone level 374  - f/u CT adrenal nodule, suspicious for malignancy      ========================INFECTIOUS DISEASE================  - Monitor WBC and for fevers    Oral Candidiasis  - c/w nystatin     Acute generalized exanthematous pustulosis   - c/w calamine,  Vaseline    ===================== SKIN =========================  Psoriasis   Lymphedema   R. Diabetic foot ulcer  - Elevate legs  - Compression stockings, ACE wrapping  - Podiatry following- foot wounds management, decubiti precautions f/u outpt, check for iodine gel allergy        Patient requires continuous monitoring with bedside rhythm monitoring, pulse ox monitoring, and intermittent blood gas analysis. Care plan discussed with ICU care team. Patient remained critical and at risk for life threatening decompensation.  Patient seen, examined and plan discussed with CCU team during rounds.       I have personally provided __30__ minutes of critical care time excluding time spent on separate procedures, in addition to initial critical care time provided by the CICU Attending, Dr. Sandoval.

## 2024-05-02 NOTE — PROCEDURE NOTE - ADDITIONAL PROCEDURE DETAILS
1) Indication for interrogation: Inappropriate pacing spikes noted on tele  2) Presenting rhythm: SR in the 80-90's   3) Measured data WNL, normal pacemaker function, Pt is not pacemaker dependent,  only 0.1%, AM- <0.1%, AV Conduction 99.6%.  4) Pacemaker interrogation revealed normal device function. Pacing spikes noted on telemetry is consistent with artifact as it shows random pacing spikes despite in SR with HR 80-90's. Can obtain 12 lead EKG to prove that it is artifact the next time inappropriate pacing spikes noted on tele.  5) [facility].     MARIANA Zaman, NP-C  [id number]

## 2024-05-02 NOTE — PROGRESS NOTE ADULT - ASSESSMENT
73F w HFrEF (EF 30%), mod AS, known LBBB, HTN, T1DM, CKD, hypothyroidism, chronic lymphedema, suspected OHS/LELIA on 3L NC home O2 p/w 1 episode of syncope with R arm jerking, likely 2/2 severe symptomatic AS. CTA imagings performed for TAVR eval, c/b DUNCAN on CKD. C/b febrile and hypotension, sepsis workup pending. CT c/a/p w/w/o IV contrast revealed incidental finding of L adrenal nodule.    #L adrenal nodule    Incidentally found on CT c/a/p w/wo IV contrast done for TAVR evaluation. The left adrenal gland is thickened and a 1.2 x 0.8 cm left adrenal nodule is seen. The right adrenal gland is normal.   Primary team discussed with radiology. HU of the adrenal nodule not able to be accurately determined due to motion artifact, also given imaging was taken at venous phase.      Test for excess adrenal hormones:   - Dex suppression test: AM cortisol 9.4 not suppressed with sufficiently high dexamethasone 374 (4/18/24). Repeat test AM cortisol 4.1 not suppressed with ACTH 9.5, dex level 449 (4/20/24). Concerning for possible Cushings, likely primary adrenal source given ACTH not elevated, however, patient is also in ICU on pressors and in a stressed state. Urine cortisol low at 1.2mcg/24 hour but only 200ml for 24 hours. Salivary cortisol cancelled for QNS, repeat salivary cortisol specimen received   - plasma metanephrines 46.8 normal range    - serum aldosterone is elevated to 37.4. Plasma renin activity 9.775. Ratio = 3.82, not elevated, no hyperaldosteronism.    - DHEAS 139 wnl. Androstenedione <10.    PLAN  - Patient with 2 positive DST - concerning for cushing syndrome. No indication for treatment at this time, would recommend repeating testing outpatient after patient resolved from acute critical illness - will give signout to patient's endocrinologist Dr. Luis Dumont prior to discharge   - Follow up salivary cortisol (specimen received)   - May need to repeat 24 hour urine cortisol once renal function improved and no longer on CRRT   - Nonurgent CT non-contrast adrenal protocol to determine hounsfield units of adrenal nodule. (Of note, CT features suggesting malignancy include HU >10, mass size >4cm, and >50% contrast retention seen 10 minutes after contrast administration (contrast retention is of low specificity/sensitivity).)   - Will need follow up outpatient with Dr. Luis Dumont     #T1DM  #DKA  Has hx of T1DM since age 26  Endocrinologist: Dr. Luis Dumont  Home regimen: Lantus 33 units qhs, Novolog based on sliding scale TIDAC normally 3-5 units  Has Dexcom G7  A1c is 7.4%  C peptide undetectable <0.1 with glucose 107, confirming insulin deficiency  Transitioned to basal/bolus from insulin gtt  PLAN  - c/w Lantus 32 units QHS  - Decrease Admelog to 8 units TIDAC  - Low dose admelog correction scale TIDAC, Low dose admelog correction scale QHS  - Goal BG in -180  - Of note, patient instructed on discharge from recent hospitalization at [facility] to start farxiga for CHF. Given risks of DKA of SGLT2i in T1DM, would not start until better data is available for its benefits.  - Discharge regimen: home basal/bolus insulin, dose TBD.  - Follow up with Dr. Luis Dumont outpatient    #Hx of Hypothyroidism  TSH slightly elevated to 4.79-5.06 with normal FT4 1.3-1.5  - continue home LT4 75mcg daily  - repeat TFT's outpatient when clinically stabilized    ICU patient requires close monitoring.   Discussed recommendations with primary team provider.       Clemente Enamorado MD  Endocrine Fellow  Can be reached via [employer] teams.    For follow up questions, discharge recommendations, or new consults, please email [email or web address] or [email or web address] ([email or web address]) or call answering service at [phone] (weekdays); [phone] (nights/weekends).  For emergiencies please page fellow on call.

## 2024-05-02 NOTE — PROGRESS NOTE ADULT - SUBJECTIVE AND OBJECTIVE BOX
[facility]    Howard Kimble, Cherise, Carlos, Alecia, Herman      [phone]    CHIEF COMPLAINT: Patient is a 73y old  Female who presents with a chief complaint of Syncope (02 May 2024 07:59)      Follow Up: severe vol ol    Interim history: The patient reports no new symptoms.  Denies chest discomfort and shortness of breath.  No abdominal pain.  No new neurologic symptoms. Is frustrated by ongoing hospitalization      MEDICATIONS  (STANDING):  ascorbic acid 500 milliGRAM(s) Oral daily  aspirin  chewable 81 milliGRAM(s) Oral daily  atorvastatin 80 milliGRAM(s) Oral at bedtime  buMETAnide IVPB 4 milliGRAM(s) IV Intermittent once  calamine/zinc oxide Lotion 1 Application(s) Topical three times a day  chlorhexidine 2% Cloths 1 Application(s) Topical daily  chlorhexidine 4% Liquid 1 Application(s) Topical <User Schedule>  CRRT Treatment    <Continuous>  epoetin mendoza-epbx (RETACRIT) Injectable 75930 Unit(s) IV Push <User Schedule>  ferrous    sulfate 325 milliGRAM(s) Oral two times a day  heparin   Injectable 5000 Unit(s) SubCutaneous every 8 hours  heparin  Infusion Syringe 300 Unit(s)/Hr (0.6 mL/Hr) CRRT <Continuous>  insulin glargine Injectable (LANTUS) 32 Unit(s) SubCutaneous at bedtime  insulin lispro (ADMELOG) corrective regimen sliding scale   SubCutaneous Before meals and at bedtime  insulin lispro Injectable (ADMELOG) 9 Unit(s) SubCutaneous three times a day before meals  levothyroxine 75 MICROGram(s) Oral daily  multivitamin 1 Tablet(s) Oral daily  mupirocin 2% Ointment 1 Application(s) Topical three times a day  nystatin    Suspension 454286 Unit(s) Oral four times a day  petrolatum white Ointment 1 Application(s) Topical three times a day  PrismaSATE Dialysate BGK 4 / 2.5 5000 milliLiter(s) (1400 mL/Hr) CRRT <Continuous>  PrismaSOL Filtration BGK 4 / 2.5 5000 milliLiter(s) (1000 mL/Hr) CRRT <Continuous>  PrismaSOL Filtration BGK 4 / 2.5 5000 milliLiter(s) (300 mL/Hr) CRRT <Continuous>  senna 2 Tablet(s) Oral at bedtime  vasopressin Infusion 0.02 Unit(s)/Min (3 mL/Hr) IV Continuous <Continuous>    MEDICATIONS  (PRN):  polyethylene glycol 3350 17 Gram(s) Oral two times a day PRN Constipation      REVIEW OF SYSTEMS:  eye, ent, GI, , allergic, dermatologic, musculoskeletal and neurologic are negative except as described above    Vital Signs Last 24 Hrs  T(C): 36.7 (02 May 2024 07:00), Max: 37.2 (02 May 2024 03:00)  T(F): 98.1 (02 May 2024 07:00), Max: 98.9 (02 May 2024 03:00)  HR: 100 (02 May 2024 10:00) (74 - 108)  BP: --  BP(mean): --  RR: 26 (02 May 2024 10:00) (11 - 37)  SpO2: 96% (02 May 2024 10:00) (69% - 100%)    Parameters below as of 02 May 2024 08:30  Patient On (Oxygen Delivery Method): room air        I&O's Summary    01 May 2024 07:01  -  02 May 2024 07:00  --------------------------------------------------------  IN: 332 mL / OUT: 4960 mL / NET: -4628 mL    02 May 2024 07:01  -  02 May 2024 10:56  --------------------------------------------------------  IN: 499.5 mL / OUT: 1205 mL / NET: -705.5 mL        Telemetry past 24h: sr    PHYSICAL EXAM:    Constitutional: obese, NAD   HEENT:  MMM, sclerae anicteric, conjunctivae clear, no oral cyanosis.  Pulmonary: Non-labored, breath sounds are clear bilaterally, No wheezing, rales or rhonchi  Cardiovascular: Regular, S1 and S2.  No murmur.  No rubs, gallops or clicks  Gastrointestinal: Bowel Sounds present, soft, nontender.   Lymph: N3+ o peripheral edema.   Neurological: Alert, no focal deficits  Skin: No rashes.  Psych:  Mood & affect appropriate    LABS: All Labs Reviewed:                        7.9    13.06 )-----------( 149      ( 02 May 2024 00:16 )             26.4                         8.1    10.45 )-----------( 145      ( 01 May 2024 16:35 )             26.3                         7.5    9.67  )-----------( 143      ( 01 May 2024 10:26 )             24.5     02 May 2024 06:50    134    |  102    |  13     ----------------------------<  131    4.4     |  21     |  1.21   02 May 2024 00:16    133    |  100    |  14     ----------------------------<  121    4.3     |  21     |  1.28   01 May 2024 16:35    134    |  102    |  14     ----------------------------<  161    4.5     |  21     |  1.38     Ca    8.1        02 May 2024 06:50  Ca    8.2        02 May 2024 00:16  Ca    8.2        01 May 2024 16:35  Phos  2.1       02 May 2024 06:50  Phos  1.9       02 May 2024 00:16  Phos  2.3       01 May 2024 16:35  Mg     2.3       02 May 2024 06:50  Mg     2.2       02 May 2024 00:16  Mg     2.4       01 May 2024 16:35    TPro  6.2    /  Alb  3.0    /  TBili  0.8    /  DBili  x      /  AST  18     /  ALT  11     /  AlkPhos  75     02 May 2024 06:50  TPro  6.2    /  Alb  3.1    /  TBili  0.7    /  DBili  x      /  AST  18     /  ALT  14     /  AlkPhos  76     02 May 2024 00:16  TPro  6.4    /  Alb  3.2    /  TBili  0.7    /  DBili  x      /  AST  16     /  ALT  11     /  AlkPhos  77     01 May 2024 16:35    PT/INR - ( 01 May 2024 04:39 )   PT: 12.0 sec;   INR: 1.15 ratio         PTT - ( 01 May 2024 04:39 )  PTT:27.7 sec      Blood Culture: Organism --  Gram Stain Blood -- Gram Stain --  Specimen Source .Blood Blood  Culture-Blood --    Organism --  Gram Stain Blood -- Gram Stain --  Specimen Source .Blood Blood  Culture-Blood --           RADIOLOGY:    EKG:    Echo:

## 2024-05-02 NOTE — PROGRESS NOTE ADULT - ASSESSMENT
73F w HFrEF (EF 30%), mod AS, known LBBB, HTN, IDDM1, CKD, hypothyroidism, chronic lymphedema, p/w 1 episode of syncope with R arm jerking.     #Syncope-Aortic Stenosis  - Known Aortic Stenosis likely Severe in setting of decreased LVEF  - s/p tavr on 4/24 c/b VT -> shock -> VFib -> shock  - hypoxic/congested, and was intubated, now extubated on 4/25 in the evening, on NC   - on 4/25 with worsening bradycardia, and now s/p TVP placement followed by AV Micra placement with removal of TVP  - Remains in Sinus Rhythm/known lbbb  - cont pressors as needed, currently on Vaso  - HD per renal    #Chronic Systolic HF (EF 30%), mod to severe AS, HTN, LBBB, Lymphedema   - Has known systolic HF and AS.    - Repeat TTE showed EF 30%, mild-mod LVH, mildly reduced RVF, mod-severe AS (.61 cmsq), mod pHTN  - On home Torsemide 20 mg daily, Eplerenone 25 mg daily, and Toprol  mg daily  - proBNP 51K from 80k.   - GDMT on hold in the setting of shock    #LBBB  Known LBBB  Noted intermittent Wenckebach on telemetry with bradycardia and 2:1 AV conduction  - s/p tvp placement followed by AV Micra placement and removal of TVP  - Sinus Rhythm    - very high risk of decompensation

## 2024-05-02 NOTE — PROGRESS NOTE ADULT - ASSESSMENT
73F w HFrEF (EF 30%), mod AS, known LBBB, HTN, IDDM1, CKD, hypothyroidism, chronic lymphedema, suspected OHS/LELIA on 3L NC home O2 p/w 1 episode of syncope. Recent admission at [facility] (3/29-4/5) for ADHF and DUNCAN s/p diuresis, discharged with new home O2 3L NC suspecting OHS/LELIA pending outpatient w/u. Since discharge a week ago, she has been staying at home with   Pt had sob walking to car. Once in car, she was still breathing heavily. Partner noticed pt was not responding to verbal stimuli for 10-15sec and witnessed R arm jerking. Pt gained consciousness quickly without postictal symptoms. Pt went to Cardiologist Dr. Nathan who recommended pt to come to ED. No fever, cp, abd pain, n/v. Leg swelling is improved from prior. Pt on torsemide 40mg which she has been taking. Pt was discharged on 3LNC which she is currently on. Patient reports she did not take Farxiga that she was discharged on as she was concerned about side effects.     In ED, patient was found afebrile, hemodynamically stable, breathing well on 3L NC. Initial labs notable for mild hyponatremia, DUNCAN on CKD, elevated proBNP and elevated pCO2 both improved from prior.  pt also noticed with abn creatinine      1- CKD IV  with DUNCAN   2- CHF   3- lymphedema   4- severe AS  s/p tavr 4/24  5- syncope   6- hyperkalemia  7- hypotension         suspect ATN from hypotension along with contrast nephropathy   creatinine worsening requiring renal replacement therapy, HD initiated 4/18  fluid status improving   dc  CRRT  bfr 200 heparin 300 unit/hr to circuit and cont  dfr 1400 fluid removal 180  cc/hr fluid removal per hr   intermittent hd as of am with midodrine   derm following for rash  pressor support  with vasopressin   d.w CCU team

## 2024-05-02 NOTE — PROGRESS NOTE ADULT - ASSESSMENT
Assessment:  73F PMH: HFrEF (EF 30%), AS, LBBB, HTN, DM1, CKD, hypothyroidism, chronic lymphedema, LELIA (3L home O2) p/w for syncope 2/2 severe AS, s/p TAVR 4/24. Course complicated by contrast induced allergic reaction (had CTA for TAVR eval) and contrast related renal failure (acute on chronic) requiring HD. TAVR c/b VT and vfib requiring shock and flash pulmonary edema causing AHRF requiring intubation. Currently extubated,  on CVVPHD for fluid removal, and on pressor support for vasoplegia and hypovolemia due to CRRT.    NEURO:  - Extubated 4/26  - Currently AOx4  - OOBTC, c/w PT as tolerated    PULM  # AHRF  2/2 flash pulmonary edema  # LELIA (on 3L home oxygen)  - Extubated 4/26  - 4/27 CXR - no consolidation or pleural effusion  - intermittently requiring nasal cannula for comfort    CV  #Shock state  -  vasoplegia from sedation vs losing fluid from CVVHD vs distributive septic shock  - on vasopressin 0.02  - f/u US for IVC collapsibility    #VT/vfib s/p shock  #2:1 AV block  #Severe AS s/p TAVR  - Known LBBB with first degree AV block on telemetry  - s/p Micra 4/25, no longer requiring TVP (eventual candidate for CRT per EP)  - c/w ASA, atorvastatin     # HFrEF  - EF 25%  - Holding GDMT while on pressors    /RENAL  #ESRD  - from ATN and contrast nephropathy  - c/w CRRT,  rate to180 -  250 cc/hr given persistent volume overload for one more day and switch to intermittent HD  - pressor support PRN for volume removal   - Strict I/Os  - Trend BMP, lytes  - Avoid nephrotoxins  - Nephro following  - f/u bladder scan after CVVHD has stopped    GI  - tolerating  diet    ENDO  # DM1  # DKA    - on  basal/bolus lantus 32 QHS, admelog inc to 9u TIDAC   - monitor POCT FS, goal 140-180    # Hypothyroidism  - c/w home levothyroxine     #Cushing  # L adrenal nodule  -dexamethasone suppression test w/ am cortisol - not suppressed, repeat 1mg DST   -repeat 24hr urine cortisol once not on CRRT, previous 1.2  -f/u MN salivary cortisol, dexamethasone level 374  - f/u CT adrenal nodule, suspicious for malignancy    SKIN  # Acute generalized exanthematous pustulosis   - c/w calamine,  Vaseline    # Psoriasis   # Lymphedema   # R. Diabetic foot ulcer  - Elevate legs  - Compression stockings, ACE wrapping  - Podiatry following- foot wounds management, decubiti precautions f/u outpt, check for iodine gel allergy    ID  - Monitor WBC and for fevers    # Oral Candidiasis  - c/w nystatin     Heme  #Anemia  -AARTI vs AOCD vs anemia 2/2 blood loss vs myelosuppression iso critical illness  -trend H&H, transfuse PRN    #thrombocytopenia  -  myelosuppression iso critical illness  - no active signs of bleeding, monitor plts, transfuse PRN      Mobilize out of bed once off CRRT   Patient requires continuous monitoring with bedside rhythm monitoring, pulse ox monitoring, and intermittent blood gas analysis. Care plan discussed with ICU care team. Patient remained critical and at risk for life threatening decompensation.  Patient seen, examined and plan discussed with CCU team during rounds.    Assessment:  73F PMH: HFrEF (EF 30%), AS, LBBB, HTN, DM1, CKD, hypothyroidism, chronic lymphedema, LELIA (3L home O2) p/w for syncope 2/2 severe AS, s/p TAVR 4/24. Course complicated by contrast induced allergic reaction (had CTA for TAVR eval) and contrast related renal failure (acute on chronic) requiring HD. TAVR c/b VT and vfib requiring shock and flash pulmonary edema causing AHRF requiring intubation. Currently extubated,  on CVVPHD for fluid removal, and on pressor support for vasoplegia and hypovolemia due to CRRT.    NEURO:  - Extubated 4/26  - Currently AOx4  - OOBTC, c/w PT as tolerated  - Pt recommends HONEY    PULM  # AHRF  2/2 flash pulmonary edema  # LELIA (on 3L home oxygen)  - Extubated 4/26  - 4/27 CXR - no consolidation or pleural effusion  - on RA now    CV  #Shock state  -  vasoplegia from sedation vs losing fluid from CVVHD vs distributive septic shock  - on vasopressin 0.01  - f/u US for IVC collapsibility    #VT/vfib s/p shock  #2:1 AV block  #Severe AS s/p TAVR  - Known LBBB with first degree AV block on telemetry  - s/p Micra 4/25, no longer requiring TVP (eventual candidate for CRT per EP)  - changes noted on telemetry requiring EP f/u and possible interrogation of device  - c/w ASA, atorvastatin     # HFrEF  - EF 25%  - Holding GDMT while on pressors    /RENAL  #ESRD  - from ATN and contrast nephropathy  - c/w pressor support   - Strict I/Os  - Trend BMP, lytes  - Avoid nephrotoxins  - Nephro following- pt still remains fluid overloaded  - dc CRRT for 24 hr rest, monitor for start of IHD possibly tomorrow  - give bumex 4 mg, monitor response, if inadequate, start bumex ggt    GI  - tolerating  diet    ENDO  # DM1  # DKA    - on  basal/bolus lantus 32 QHS, admelog inc to 9u TIDAC   - monitor POCT FS, goal 140-180    # Hypothyroidism  - c/w home levothyroxine     #Cushing  # L adrenal nodule  -dexamethasone suppression test w/ am cortisol - not suppressed, repeat 1mg DST   -repeat 24hr urine cortisol once not on CRRT, previous 1.2  -f/u MN salivary cortisol, dexamethasone level 374      SKIN  # Acute generalized exanthematous pustulosis   - c/w calamine,  Vaseline    # Psoriasis   # Lymphedema   # R. Diabetic foot ulcer  - Elevate legs  - Compression stockings, ACE wrapping  - Podiatry following- foot wounds management, decubiti precautions f/u outpt, check for iodine gel allergy    ID  #leukocytosis  - afebrile, bcx neg  - Monitor WBC and for fevers, and signs of infection    # Oral Candidiasis  - c/w nystatin     Heme  #Anemia  -AARTI vs AOCD vs anemia 2/2 blood loss vs myelosuppression iso critical illness  -trend H&H, transfuse PRN    #thrombocytopenia  -  myelosuppression iso critical illness  - no active signs of bleeding, monitor plts, transfuse PRN      Mobilize out of bed once off CRRT   Patient requires continuous monitoring with bedside rhythm monitoring, pulse ox monitoring, and intermittent blood gas analysis. Care plan discussed with ICU care team. Patient remained critical and at risk for life threatening decompensation.  Patient seen, examined and plan discussed with CCU team during rounds.

## 2024-05-02 NOTE — PROGRESS NOTE ADULT - ATTENDING COMMENTS
Initially admitted with syncope in the setting of severe AS s/p TAVR   Hospital course has been complicated by VT/VF cardiac arrest, DUNCAN requiring CVVH, heart block requiring PPM and contrast-induced skin sloughing  A+Ox3  Shock requiring Vasopressin in the setting of CVVH, likely due to intravascular depletion - wean as able  TTE with severely reduced LVEF  O2 sats mid 90s on room air  DASH/diabetic diet  DUNCAN requiring CVVH at 250 cc hour, makes some urine independently - will stop per Renal and transition to iHD  H/H low but acceptable on HSQ for DVT prophylaxis   Afebrile, no antibiotics  Sugars controlled on Lantus  Endocrine if following for adrenal nodule and concern for Cushings - f/u recs  Marily 4/24 and L subclavian Rocky 4/26 - remove Rocky CROWELLOB

## 2024-05-02 NOTE — PROGRESS NOTE ADULT - SUBJECTIVE AND OBJECTIVE BOX
PROGRESS NOTE:   Authored by Dr. Jane Sandy  Patient is a 73y old  Female who presents with a chief complaint of Syncope (02 May 2024 07:59)        OVERNIGHT EVENTS: De La Garza placed for urinary retention of 600 cc urine ON. Minimal UOP w/ brown sediment c/w casts. CRRT circuit changed 3 times, last time unable to return blood. Remains on vasopressin 0.01, unable to tolerate weaning, SBP dropped to low 80's. Hypophosphatemia in am, phosphorus replaced.    SUBJECTIVE: Patient was seen and examined at bedside this morning.   Denies any nausea/vomiting/diarrhea, headache, shortness of breath, abdominal pain or chest pain/palpitations.   Patient responding appropriately to questions and able to make needs known.   Vital signs/imaging/labs/telemetry events reviewed.   No acute complaints or concerns. Pt is feeling well. Tolerating PO.  Stating she feels fine.     All other ROS neg except as above       MEDICATIONS  (STANDING):  ascorbic acid 500 milliGRAM(s) Oral daily  aspirin  chewable 81 milliGRAM(s) Oral daily  atorvastatin 80 milliGRAM(s) Oral at bedtime  buMETAnide IVPB 4 milliGRAM(s) IV Intermittent once  calamine/zinc oxide Lotion 1 Application(s) Topical three times a day  chlorhexidine 2% Cloths 1 Application(s) Topical daily  chlorhexidine 4% Liquid 1 Application(s) Topical <User Schedule>  CRRT Treatment    <Continuous>  epoetin mendoza-epbx (RETACRIT) Injectable 00138 Unit(s) IV Push <User Schedule>  ferrous    sulfate 325 milliGRAM(s) Oral two times a day  heparin   Injectable 5000 Unit(s) SubCutaneous every 8 hours  heparin  Infusion Syringe 300 Unit(s)/Hr (0.6 mL/Hr) CRRT <Continuous>  insulin glargine Injectable (LANTUS) 32 Unit(s) SubCutaneous at bedtime  insulin lispro (ADMELOG) corrective regimen sliding scale   SubCutaneous Before meals and at bedtime  insulin lispro Injectable (ADMELOG) 9 Unit(s) SubCutaneous three times a day before meals  levothyroxine 75 MICROGram(s) Oral daily  multivitamin 1 Tablet(s) Oral daily  mupirocin 2% Ointment 1 Application(s) Topical three times a day  nystatin    Suspension 025380 Unit(s) Oral four times a day  petrolatum white Ointment 1 Application(s) Topical three times a day  PrismaSATE Dialysate BGK 4 / 2.5 5000 milliLiter(s) (1400 mL/Hr) CRRT <Continuous>  PrismaSOL Filtration BGK 4 / 2.5 5000 milliLiter(s) (1000 mL/Hr) CRRT <Continuous>  PrismaSOL Filtration BGK 4 / 2.5 5000 milliLiter(s) (300 mL/Hr) CRRT <Continuous>  senna 2 Tablet(s) Oral at bedtime  vasopressin Infusion 0.02 Unit(s)/Min (3 mL/Hr) IV Continuous <Continuous>    MEDICATIONS  (PRN):  polyethylene glycol 3350 17 Gram(s) Oral two times a day PRN Constipation      CAPILLARY BLOOD GLUCOSE      POCT Blood Glucose.: 140 mg/dL (02 May 2024 06:28)  POCT Blood Glucose.: 177 mg/dL (01 May 2024 21:07)  POCT Blood Glucose.: 165 mg/dL (01 May 2024 16:26)  POCT Blood Glucose.: 183 mg/dL (01 May 2024 12:03)    I&O's Summary    01 May 2024 07:01  -  02 May 2024 07:00  --------------------------------------------------------  IN: 332 mL / OUT: 4960 mL / NET: -4628 mL    02 May 2024 07:01  -  02 May 2024 10:04  --------------------------------------------------------  IN: 326.5 mL / OUT: 10 mL / NET: 316.5 mL        PHYSICAL EXAM:  Vital Signs Last 24 Hrs  T(C): 36.7 (02 May 2024 07:00), Max: 37.2 (02 May 2024 03:00)  T(F): 98.1 (02 May 2024 07:00), Max: 98.9 (02 May 2024 03:00)  HR: 99 (02 May 2024 08:00) (74 - 108)  BP: --  BP(mean): --  RR: 16 (02 May 2024 08:00) (11 - 37)  SpO2: 90% (02 May 2024 08:00) (69% - 100%)    Parameters below as of 02 May 2024 07:00  Patient On (Oxygen Delivery Method): room air        PHYSICAL EXAM:     GENERAL: NAD  HEAD:  Atraumatic, Normocephalic  EYES: EOMI, conjunctiva and sclera clear, no nystagmus noted  ENT: Moist mucous membranes  CHEST/LUNG: normal work of breathing, Clear to auscultation bilaterally; No rales, rhonchi, wheezing, or rubs. Unlabored respirations  HEART:  Regular rate and rhythm; No murmurs, rubs, or gallops, normal S1/S2  ABDOMEN: normal bowel sounds; Soft, nontender, nondistended, no organomegaly   EXTREMITIES:  extensive chronic edema in b/l LE, dec Peripheral Pulses, No clubbing, cyanosis  MSK: No gross deformities noted, ROM wnl   Neurological:  A&Ox3, no focal deficits   SKIN: dry peeling flakey pale,  scattered psoriatic plaques throughout extremities and torso, no blistering  or  drainage  BL LE soft plaques easily lifted w/ mechanical debridement , w/ open skin & blistering and scant drainage  Rt plantar foot w/ dark blister around callous  No odor, erythema, increased warmth, tenderness, induration, fluctuance, nor crepitus  PSYCH: Normal mood, affect       LABS:                        7.9    13.06 )-----------( 149      ( 02 May 2024 00:16 )             26.4     05-02    134<L>  |  102  |  13  ----------------------------<  131<H>  4.4   |  21<L>  |  1.21    Ca    8.1<L>      02 May 2024 06:50  Phos  2.1     05-02  Mg     2.3     05-02    TPro  6.2  /  Alb  3.0<L>  /  TBili  0.8  /  DBili  x   /  AST  18  /  ALT  11  /  AlkPhos  75  05-02    PT/INR - ( 01 May 2024 04:39 )   PT: 12.0 sec;   INR: 1.15 ratio         PTT - ( 01 May 2024 04:39 )  PTT:27.7 sec          Tele Reviewed:    RADIOLOGY & ADDITIONAL TESTS:  Results Reviewed: yes  Imaging Personally Reviewed: yes  Electrocardiogram Personally Reviewed: yes

## 2024-05-02 NOTE — PROVIDER CONTACT NOTE (OTHER) - ASSESSMENT
pt reported feeling dizzy. head did not hit equipment or floor. reported feeling no dizziness once sitting on floor. denies pain SOB or loss of consciousness. vitals stable

## 2024-05-02 NOTE — PROGRESS NOTE ADULT - ATTENDING COMMENTS
73F w HFrEF (EF 30%), mod AS, known LBBB, HTN, T1DM, CKD, hypothyroidism, chronic lymphedema, suspected OHS/LELIA on 3L [place] home O2 p/w 1 episode of syncope with R arm jerking, likely 2/2 severe symptomatic AS. CTA imagings performed for TAVR eval, c/b DUNCAN on CKD. C/b febrile and hypotension, sepsis workup pending. CT c/a/p w/w/o IV contrast revealed incidental finding of L adrenal nodule.   Will need to complete cushings workup as outpatient given false positive in the acute setting.  Agree with basal/bolus plan as above.  Patient is high risk and high level decision making, requiring ICU level of care.

## 2024-05-02 NOTE — PROGRESS NOTE ADULT - SUBJECTIVE AND OBJECTIVE BOX
Clemente Enamorado MD, PGY-4  Endocrinology fellow  Available on [employer] Teams    Interval Events: No acute overnight events. Pt seen and examined. Tolerating PO, no nausea/vomitting. No hypoglycemia symptoms. Denies fevers, chills, CP, SOB, Abdominal pain, constipation, Diarrhea.      MEDICATIONS  (STANDING):  ascorbic acid 500 milliGRAM(s) Oral daily  aspirin  chewable 81 milliGRAM(s) Oral daily  atorvastatin 80 milliGRAM(s) Oral at bedtime  calamine/zinc oxide Lotion 1 Application(s) Topical three times a day  chlorhexidine 2% Cloths 1 Application(s) Topical daily  chlorhexidine 4% Liquid 1 Application(s) Topical <User Schedule>  CRRT Treatment    <Continuous>  epoetin mendoza-epbx (RETACRIT) Injectable 69572 Unit(s) IV Push <User Schedule>  ferrous    sulfate 325 milliGRAM(s) Oral two times a day  heparin   Injectable 5000 Unit(s) SubCutaneous every 8 hours  heparin  Infusion Syringe 300 Unit(s)/Hr (0.6 mL/Hr) CRRT <Continuous>  insulin glargine Injectable (LANTUS) 32 Unit(s) SubCutaneous at bedtime  insulin lispro (ADMELOG) corrective regimen sliding scale   SubCutaneous Before meals and at bedtime  insulin lispro Injectable (ADMELOG) 8 Unit(s) SubCutaneous three times a day before meals  levothyroxine 75 MICROGram(s) Oral daily  multivitamin 1 Tablet(s) Oral daily  mupirocin 2% Ointment 1 Application(s) Topical three times a day  nystatin    Suspension 422180 Unit(s) Oral four times a day  petrolatum white Ointment 1 Application(s) Topical three times a day  PrismaSATE Dialysate BGK 4 / 2.5 5000 milliLiter(s) (1400 mL/Hr) CRRT <Continuous>  PrismaSOL Filtration BGK 4 / 2.5 5000 milliLiter(s) (1000 mL/Hr) CRRT <Continuous>  PrismaSOL Filtration BGK 4 / 2.5 5000 milliLiter(s) (300 mL/Hr) CRRT <Continuous>  senna 2 Tablet(s) Oral at bedtime  vasopressin Infusion 0.02 Unit(s)/Min (3 mL/Hr) IV Continuous <Continuous>    MEDICATIONS  (PRN):  polyethylene glycol 3350 17 Gram(s) Oral two times a day PRN Constipation      Allergies    contrast media (iodine-based) (Fever; Vomiting; Flushing; Hypotension; Rash; Stomach Upset)  Ativan (Rash; Urticaria)  penicillins (Hives (Mod to Severe); Anaphylaxis (Mod to Severe); Short breath (Mod to Severe); Angioedema (Mod to Severe))    Intolerances          ================PHYSICAL EXAM======================  VITALS: T(C): 36.7 (05-02-24 @ 07:00)  T(F): 98.1 (05-02-24 @ 07:00), Max: 98.9 (05-02-24 @ 03:00)  HR: 103 (05-02-24 @ 17:00) (80 - 116)  BP: --  RR:  (16 - 36)  SpO2:  (90% - 100%)  Wt(kg): --  GENERAL: NAD, appears comfortable  EYES: No proptosis, no lid lag, anicteric  HEENT:  Atraumatic, Normocephalic, moist mucous membranes  RESPIRATORY: nonlabored respirations, no wheezing  PSYCH: Alert and awake  ===================================================    CAPILLARY BLOOD GLUCOSE      POCT Blood Glucose.: 102 mg/dL (02 May 2024 16:57)  POCT Blood Glucose.: 99 mg/dL (02 May 2024 12:09)  POCT Blood Glucose.: 140 mg/dL (02 May 2024 06:28)  POCT Blood Glucose.: 177 mg/dL (01 May 2024 21:07)      05-02    134<L>  |  102  |  13  ----------------------------<  131<H>  4.4   |  21<L>  |  1.21    eGFR: 48<L>    Ca    8.1<L>      05-02  Mg     2.3     05-02  Phos  2.1     05-02    TPro  6.2  /  Alb  3.0<L>  /  TBili  0.8  /  DBili  x   /  AST  18  /  ALT  11  /  AlkPhos  75  05-02      A1C with Estimated Average Glucose Result: 7.4 % (03-30-24 @ 08:21)  A1C with Estimated Average Glucose Result: 6.8 % (01-10-24 @ 08:37)  A1C with Estimated Average Glucose Result: 7.3 % (08-12-23 @ 07:58)  A1C with Estimated Average Glucose Result: 8.2 % (06-05-23 @ 06:10)      Thyroid Function Tests:  04-14 @ 07:18 TSH 5.06 FreeT4 1.3 T3 -- Anti TPO -- Anti Thyroglobulin Ab -- TSI --  04-13 @ 07:05 TSH 4.79 FreeT4 1.5 T3 -- Anti TPO -- Anti Thyroglobulin Ab -- TSI --

## 2024-05-03 NOTE — CHART NOTE - NSCHARTNOTEFT_GEN_A_CORE
NUTRITION FOLLOW UP NOTE    PATIENT SEEN FOR: follow up    SOURCE: [x] Patient  [x] Current Medical Record  [] RN  [x] Family/support person at bedside  [] Patient unavailable/inappropriate  [] Other:    CHART REVIEWED/EVENTS NOTED.  [] No changes to nutrition care plan to note  [x] Nutrition Status:  - plan to transition from CRRT to intermittent HD per nephrology    DIET ORDER:   Diet, Consistent Carbohydrate w/Evening Snack (24)    CURRENT DIET ORDER IS:  [x] Inadequate:  - recommend addition of low sodium to diet order in setting of HF, TAVR during admission    NUTRITION INTAKE/PROVISION:  [x] PO:  - patient continues to eat adequately, reports eating at least 75% of all meals up to 100% of meals  - no chewing/swallowing difficulty or nausea/vomiting reported    ANTHROPOMETRICS:  Drug Dosing Weight  Height (cm): 180.3 (2024 10:19)  Weight (kg): 149.7 (2024 10:19)  BMI (kg/m2): 46.1 (2024 10:19)  Weights:   Daily Weight in k (-), Weight in k.8 (), Weight in k (), Weight in k.2 (), Weight in k.2 ()    MEDICATIONS:  MEDICATIONS  (STANDING):  ascorbic acid 500 milliGRAM(s) Oral daily  aspirin  chewable 81 milliGRAM(s) Oral daily  atorvastatin 80 milliGRAM(s) Oral at bedtime  buMETAnide Infusion 2 mG/Hr (10 mL/Hr) IV Continuous <Continuous>  buMETAnide IVPB 4 milliGRAM(s) IV Intermittent once  calamine/zinc oxide Lotion 1 Application(s) Topical three times a day  chlorhexidine 2% Cloths 1 Application(s) Topical daily  chlorhexidine 4% Liquid 1 Application(s) Topical <User Schedule>  epoetin mendoza-epbx (RETACRIT) Injectable 05592 Unit(s) IV Push <User Schedule>  ferrous    sulfate 325 milliGRAM(s) Oral two times a day  heparin   Injectable 5000 Unit(s) SubCutaneous every 8 hours  insulin glargine Injectable (LANTUS) 32 Unit(s) SubCutaneous at bedtime  insulin lispro (ADMELOG) corrective regimen sliding scale   SubCutaneous Before meals and at bedtime  insulin lispro Injectable (ADMELOG) 6 Unit(s) SubCutaneous three times a day before meals  levothyroxine 75 MICROGram(s) Oral daily  multivitamin 1 Tablet(s) Oral daily  mupirocin 2% Ointment 1 Application(s) Topical three times a day  nystatin    Suspension 567802 Unit(s) Oral four times a day  petrolatum white Ointment 1 Application(s) Topical three times a day  senna 2 Tablet(s) Oral at bedtime  vasopressin Infusion 0.02 Unit(s)/Min (3 mL/Hr) IV Continuous <Continuous>    MEDICATIONS  (PRN):  polyethylene glycol 3350 17 Gram(s) Oral two times a day PRN Constipation    NUTRITIONALLY PERTINENT LABS:   Na132 mmol/L<L> Glu 138 mg/dL<H> K+ 4.4 mmol/L Cr  1.72 mg/dL<H> BUN 18 mg/dL  Phos 3.9 mg/dL  Alb 3.0 g/dL<L>  Chol 74 mg/dL LDL --    HDL 33 mg/dL<L> Trig 70 mg/dL ALT 11 U/L AST 18 U/L Alkaline Phosphatase 73 U/L    A1C with Estimated Average Glucose Result: 7.4 % (24 @ 08:21)  A1C with Estimated Average Glucose Result: 6.8 % (01-10-24 @ 08:37)    Finger Sticks:  POCT Blood Glucose.: 240 mg/dL ( @ 07:56)  POCT Blood Glucose.: 114 mg/dL ( @ 23:20)  POCT Blood Glucose.: 85 mg/dL ( @ 22:06)  POCT Blood Glucose.: 75 mg/dL ( @ 22:04)  POCT Blood Glucose.: 102 mg/dL ( @ 16:57)  POCT Blood Glucose.: 99 mg/dL ( @ 12:09)    NUTRITIONALLY PERTINENT MEDICATIONS/LABS:  [x] Reviewed  [x] Relevant notes on medications/labs:  - ongoing hyponatremia, BUN/Cr noted, to transition from CRRT to intermittent HD per nephrology  - vasopressor support w/ vasopressin  - insulin regimen w/ admelog & lantus, intermittent fluctuations in BG noted (T1DM hx)  - diuresis w/ bumex  - potassium has remained WNL, intermittent hypophosphatemia ongoing, phosphorus now WNL as of ; recommend continuing to maintain no potassium/phosphorus restrictions and continue to follow K/PO4 trends    EDEMA:  [x] Reviewed  [x] Relevant notes:  - decrease from +4 to mostly averaging +2 to +3 edema since last RD assessment    GI/ I&O:  [x] Reviewed  [] Relevant notes:  [] Other:    SKIN:   [x] Change in pressure injury documentation:  - suspected DTI to sacrum on last assessment now no longer present per documentation  - most recently updated with a stage II PI to L heel per documentation    ESTIMATED NEEDS:  [x] Updated:  Energy: -2307kcal/day (30-35kcal/kg)  Protein: 86-105g/day (1.3-1.6g/kg)  Fluid: [x] defer to team  Based on: IBW 65.9kg w/ considerations for BMI >40, acute illness, previously on CRRT now to transition to intermittent HD per nephrology, skin integrity    NUTRITION DIAGNOSIS:  [x] Prior Dx: overweight/obesity, inadequate energy intake  [] New Dx:    EDUCATION:  [x] Yes: adequate caloric/protein intake    NUTRITION CARE PLAN:  1. Diet:  - recommend addition of low sodium diet to diet order  2. Multivitamin/mineral supplementation:  - as medically feasible, continue vitamin C to help aid in wound healing and recommend to change multivitamin to nephrovite in setting of CRRT/now plan for intermittent HD treatment    [] Achieved - Continue current nutrition intervention(s)  [] Current medical condition precludes nutrition intervention at this time.    MONITORING AND EVALUATION:   RD remains available upon request and will follow up per protocol.    Jluis Rosado, ROCCO, CDN - contact on TEAMS.

## 2024-05-03 NOTE — PROGRESS NOTE ADULT - ATTENDING COMMENTS
Initially admitted with syncope in the setting of severe AS s/p TAVR   Hospital course has been complicated by VT/VF cardiac arrest, DUNCAN requiring CVVH, heart block requiring PPM and contrast-induced skin sloughing  Shock requiring Vasopressin in the setting of CVVH, likely due to intravascular depletion - wean as able  TTE with severely reduced LVEF  DUNCAN requiring CVVH at 250 cc hour, makes some urine independently - will stop per Renal and transition to iHD  H/H low but acceptable on HSQ for DVT prophylaxis   Endocrine if following for adrenal nodule and concern for Cushings - f/u recs  Marily 4/24 and L subclavian [place] 4/26 - remove Shiley  OOB.

## 2024-05-03 NOTE — PROGRESS NOTE ADULT - ASSESSMENT
73F w HFrEF (EF 30%), mod AS, known LBBB, HTN, T1DM, CKD, hypothyroidism, chronic lymphedema, suspected OHS/LELIA on 3L NC home O2 p/w 1 episode of syncope with R arm jerking, likely 2/2 severe symptomatic AS. CTA imagings performed for TAVR eval, c/b DUNCAN on CKD. C/b febrile and hypotension, sepsis workup pending. CT c/a/p w/w/o IV contrast revealed incidental finding of L adrenal nodule.    #L adrenal nodule    Incidentally found on CT c/a/p w/wo IV contrast done for TAVR evaluation. The left adrenal gland is thickened and a 1.2 x 0.8 cm left adrenal nodule is seen. The right adrenal gland is normal.   Primary team discussed with radiology. HU of the adrenal nodule not able to be accurately determined due to motion artifact, also given imaging was taken at venous phase.      Test for excess adrenal hormones:   - Dex suppression test: AM cortisol 9.4 not suppressed with sufficiently high dexamethasone 374 (4/18/24). Repeat test AM cortisol 4.1 not suppressed with ACTH 9.5, dex level 449 (4/20/24). Concerning for possible Cushings, likely primary adrenal source given ACTH not elevated, however, patient is also in ICU on pressors and in a stressed state. Urine cortisol low at 1.2mcg/24 hour but only 200ml for 24 hours. Salivary cortisol cancelled for QNS, repeat salivary cortisol specimen received   - plasma metanephrines 46.8 normal range    - serum aldosterone is elevated to 37.4. Plasma renin activity 9.775. Ratio = 3.82, not elevated, no hyperaldosteronism.    - DHEAS 139 wnl. Androstenedione <10.    PLAN  - Patient with 2 positive DST - concerning for cushing syndrome. No indication for treatment at this time, would recommend repeating testing outpatient after patient resolved from acute critical illness - will give signout to patient's endocrinologist Dr. Luis Dumont prior to discharge   - Follow up salivary cortisol (specimen received)   - May need to repeat 24 hour urine cortisol once renal function improved and no longer on CRRT   - Nonurgent CT non-contrast adrenal protocol to determine hounsfield units of adrenal nodule. (Of note, CT features suggesting malignancy include HU >10, mass size >4cm, and >50% contrast retention seen 10 minutes after contrast administration (contrast retention is of low specificity/sensitivity).)   - Will need follow up outpatient with Dr. Luis Dumont     #T1DM  #DKA  Has hx of T1DM since age 26  Endocrinologist: Dr. Luis Dumont  Home regimen: Lantus 33 units qhs, Novolog based on sliding scale TIDAC normally 3-5 units  Has Dexcom G7  A1c is 7.4%  C peptide undetectable <0.1 with glucose 107, confirming insulin deficiency  Transitioned to basal/bolus from insulin gtt  PLAN  - c/w Lantus 32 units QHS  - c/w Admelog to 8 units TIDAC  - Low dose admelog correction scale TIDAC, Low dose admelog correction scale QHS  - Goal BG in -180  - Of note, patient instructed on discharge from recent hospitalization at [place] to start farxiga for CHF. Given risks of DKA of SGLT2i in T1DM, would not start until better data is available for its benefits.  - Discharge regimen: home basal/bolus insulin, dose TBD.  - Follow up with Dr. Luis Dumont outpatient    #Hx of Hypothyroidism  TSH slightly elevated to 4.79-5.06 with normal FT4 1.3-1.5  - continue home LT4 75mcg daily  - repeat TFT's outpatient when clinically stabilized    ICU patient requires close monitoring.   Discussed recommendations with primary team provider.       Clemente Enamorado MD  Endocrine Fellow  Can be reached via [employer] teams.    For follow up questions, discharge recommendations, or new consults, please email [email or web address] or [email or web address] ([facility]) or call answering service at [phone] (weekdays); [phone] (nights/weekends).  For emergiencies please page fellow on call.

## 2024-05-03 NOTE — PROGRESS NOTE ADULT - SUBJECTIVE AND OBJECTIVE BOX
Clemente Enamorado MD, PGY-4  Endocrinology fellow  Available on [employer] Teams    Interval Events: No acute overnight events. Pt seen and examined. Tolerating PO, no nausea/vomitting. No hypoglycemia symptoms. Denies fevers, chills, CP, SOB, Abdominal pain, constipation, Diarrhea.      MEDICATIONS  (STANDING):  ascorbic acid 500 milliGRAM(s) Oral daily  aspirin  chewable 81 milliGRAM(s) Oral daily  atorvastatin 80 milliGRAM(s) Oral at bedtime  buMETAnide Infusion 2 mG/Hr (10 mL/Hr) IV Continuous <Continuous>  calamine/zinc oxide Lotion 1 Application(s) Topical three times a day  chlorhexidine 2% Cloths 1 Application(s) Topical daily  chlorhexidine 4% Liquid 1 Application(s) Topical <User Schedule>  epoetin mendoza-epbx (RETACRIT) Injectable 16877 Unit(s) IV Push <User Schedule>  ferrous    sulfate 325 milliGRAM(s) Oral two times a day  heparin   Injectable 5000 Unit(s) SubCutaneous every 8 hours  insulin glargine Injectable (LANTUS) 32 Unit(s) SubCutaneous at bedtime  insulin lispro (ADMELOG) corrective regimen sliding scale   SubCutaneous Before meals and at bedtime  insulin lispro Injectable (ADMELOG) 8 Unit(s) SubCutaneous three times a day before meals  levothyroxine 75 MICROGram(s) Oral daily  midodrine 10 milliGRAM(s) Oral every 8 hours  multivitamin 1 Tablet(s) Oral daily  mupirocin 2% Ointment 1 Application(s) Topical three times a day  nystatin    Suspension 465662 Unit(s) Oral four times a day  petrolatum white Ointment 1 Application(s) Topical three times a day  senna 2 Tablet(s) Oral at bedtime  vasopressin Infusion 0.02 Unit(s)/Min (3 mL/Hr) IV Continuous <Continuous>    MEDICATIONS  (PRN):  polyethylene glycol 3350 17 Gram(s) Oral two times a day PRN Constipation      Allergies    contrast media (iodine-based) (Fever; Vomiting; Flushing; Hypotension; Rash; Stomach Upset)  Ativan (Rash; Urticaria)  penicillins (Hives (Mod to Severe); Anaphylaxis (Mod to Severe); Short breath (Mod to Severe); Angioedema (Mod to Severe))    Intolerances          ================PHYSICAL EXAM======================  VITALS: T(C): 37.1 (05-03-24 @ 03:00)  T(F): 98.7 (05-03-24 @ 03:00), Max: 98.7 (05-03-24 @ 03:00)  HR: 83 (05-03-24 @ 14:45) (78 - 112)  BP: --  RR:  (8 - 47)  SpO2:  (90% - 100%)  Wt(kg): --  GENERAL: NAD, appears comfortable  EYES: No proptosis, no lid lag, anicteric  HEENT:  Atraumatic, Normocephalic, moist mucous membranes  RESPIRATORY: nonlabored respirations, no wheezing  PSYCH: Alert and awake  ===================================================    CAPILLARY BLOOD GLUCOSE      POCT Blood Glucose.: 242 mg/dL (03 May 2024 12:04)  POCT Blood Glucose.: 240 mg/dL (03 May 2024 07:56)  POCT Blood Glucose.: 114 mg/dL (02 May 2024 23:20)  POCT Blood Glucose.: 85 mg/dL (02 May 2024 22:06)  POCT Blood Glucose.: 75 mg/dL (02 May 2024 22:04)  POCT Blood Glucose.: 102 mg/dL (02 May 2024 16:57)      05-03    132<L>  |  100  |  18  ----------------------------<  138<H>  4.4   |  22  |  1.72<H>    eGFR: 31<L>    Ca    8.1<L>      05-03  Mg     2.2     05-03  Phos  3.9     05-03    TPro  6.0  /  Alb  3.0<L>  /  TBili  0.5  /  DBili  x   /  AST  18  /  ALT  11  /  AlkPhos  73  05-03      A1C with Estimated Average Glucose Result: 7.4 % (03-30-24 @ 08:21)  A1C with Estimated Average Glucose Result: 6.8 % (01-10-24 @ 08:37)  A1C with Estimated Average Glucose Result: 7.3 % (08-12-23 @ 07:58)  A1C with Estimated Average Glucose Result: 8.2 % (06-05-23 @ 06:10)      Thyroid Function Tests:  04-14 @ 07:18 TSH 5.06 FreeT4 1.3 T3 -- Anti TPO -- Anti Thyroglobulin Ab -- TSI --  04-13 @ 07:05 TSH 4.79 FreeT4 1.5 T3 -- Anti TPO -- Anti Thyroglobulin Ab -- TSI --

## 2024-05-03 NOTE — PROGRESS NOTE ADULT - ASSESSMENT
73F w HFrEF (EF 30%), mod AS, known LBBB, HTN, IDDM1, CKD, hypothyroidism, chronic lymphedema, p/w 1 episode of syncope with R arm jerking.     #Syncope-Aortic Stenosis  - Known Aortic Stenosis likely Severe in setting of decreased LVEF  - s/p tavr on 4/24 c/b VT -> shock -> VFib -> shock  - hypoxic/congested, and was intubated, now extubated on 4/25 in the evening, on NC   - on 4/25 with worsening bradycardia, and now s/p TVP placement followed by AV Micra placement with removal of TVP  - Remains in Sinus Rhythm/known lbbb  - cont pressors as needed, currently on Vaso  - HD per renal, CVVH on hold this morning    #Chronic Systolic HF (EF 30%), mod to severe AS, HTN, LBBB, Lymphedema   - Has known systolic HF and AS.    - Repeat TTE showed EF 30%, mild-mod LVH, mildly reduced RVF, mod-severe AS (.61 cmsq), mod pHTN  - On home Torsemide 20 mg daily, Eplerenone 25 mg daily, and Toprol  mg daily, all currently on hold  - proBNP 51K from 80k.   - GDMT on hold in the setting of shock    #LBBB  Known LBBB  Noted intermittent Wenckebach on telemetry with bradycardia and 2:1 AV conduction  - s/p tvp placement followed by AV Micra placement and removal of TVP  - Sinus Rhythm    - very high risk of decompensation

## 2024-05-03 NOTE — PROGRESS NOTE ADULT - ASSESSMENT
Assessment:  73F PMH: HFrEF (EF 30%), AS, LBBB, HTN, DM1, CKD, hypothyroidism, chronic lymphedema, LELIA (3L home O2) p/w for syncope 2/2 severe AS, s/p TAVR 4/24. Course complicated by contrast induced allergic reaction (had CTA for TAVR eval) and contrast related renal failure (acute on chronic) requiring HD. TAVR c/b VT and vfib requiring shock and flash pulmonary edema causing AHRF requiring intubation. Currently extubated,  on CVVPHD for fluid removal, and on pressor support for vasoplegia and hypovolemia due to CRRT.    NEURO:  - Extubated 4/26  - Currently AOx4  - OOBTC, c/w PT as tolerated  - Pt recommends HONEY    PULM  # AHRF  2/2 flash pulmonary edema  # LELIA (on 3L home oxygen)  - Extubated 4/26  - 4/27 CXR - no consolidation or pleural effusion  - on RA now    CV  #Shock state  -  vasoplegia from sedation vs losing fluid from CVVHD vs distributive septic shock  - on vasopressin 0.01  - f/u US for IVC collapsibility    #VT/vfib s/p shock  #2:1 AV block  #Severe AS s/p TAVR  - Known LBBB with first degree AV block on telemetry  - s/p Micra 4/25, no longer requiring TVP (eventual candidate for CRT per EP)  - changes noted on telemetry requiring EP f/u and possible interrogation of device  - c/w ASA, atorvastatin     # HFrEF  - EF 25%  - Holding GDMT while on pressors    /RENAL  #ESRD  - from ATN and contrast nephropathy  - c/w pressor support   - Strict I/Os  - Trend BMP, lytes  - Avoid nephrotoxins  - Nephro following- pt still remains fluid overloaded  - dc CRRT for 24 hr rest, monitor for start of IHD possibly tomorrow  - give bumex 4 mg, monitor response, if inadequate, start bumex ggt    GI  - tolerating  diet    ENDO  # DM1  # DKA    - on  basal/bolus lantus 32 QHS, admelog inc to 9u TIDAC   - monitor POCT FS, goal 140-180    # Hypothyroidism  - c/w home levothyroxine     #Cushing  # L adrenal nodule  -dexamethasone suppression test w/ am cortisol - not suppressed, repeat 1mg DST   -repeat 24hr urine cortisol once not on CRRT, previous 1.2  -f/u MN salivary cortisol, dexamethasone level 374      SKIN  # Acute generalized exanthematous pustulosis   - c/w calamine,  Vaseline    # Psoriasis   # Lymphedema   # R. Diabetic foot ulcer  - Elevate legs  - Compression stockings, ACE wrapping  - Podiatry following- foot wounds management, decubiti precautions f/u outpt, check for iodine gel allergy    ID  #leukocytosis  - afebrile, bcx neg  - Monitor WBC and for fevers, and signs of infection    # Oral Candidiasis  - c/w nystatin     Heme  #Anemia  -AARTI vs AOCD vs anemia 2/2 blood loss vs myelosuppression iso critical illness  -trend H&H, transfuse PRN    #thrombocytopenia  -  myelosuppression iso critical illness  - no active signs of bleeding, monitor plts, transfuse PRN      Mobilize out of bed once off CRRT   Patient requires continuous monitoring with bedside rhythm monitoring, pulse ox monitoring, and intermittent blood gas analysis. Care plan discussed with ICU care team. Patient remained critical and at risk for life threatening decompensation.  Patient seen, examined and plan discussed with CCU team during rounds.    Assessment:  73F PMH: HFrEF (EF 30%), AS, LBBB, HTN, DM1, CKD, hypothyroidism, chronic lymphedema, LELIA (3L home O2) p/w for syncope 2/2 severe AS, s/p TAVR 4/24. Course complicated by contrast induced allergic reaction (had CTA for TAVR eval) and contrast related renal failure (acute on chronic) requiring HD. TAVR c/b VT and vfib requiring shock and flash pulmonary edema causing AHRF requiring intubation. Currently extubated,  on CVVPHD for fluid removal, and on pressor support for vasoplegia and hypovolemia due to CRRT.    NEURO:  - Extubated 4/26  - Currently AOx4  - OOBTC, c/w PT as tolerated  - Pt recommends HONEY    PULM  # AHRF  2/2 flash pulmonary edema  # LELIA (on 3L home oxygen)  - Extubated 4/26  - 4/27 CXR - no consolidation or pleural effusion  - on RA now    CV  #Shock state  -  vasoplegia from sedation vs losing fluid from CVVHD vs distributive septic shock  - on vasopressin 0.05, wean off as tolerated  - f/u US for IVC collapsibility    #VT/vfib s/p shock  #2:1 AV block  #Severe AS s/p TAVR  - Known LBBB with first degree AV block on telemetry  - s/p Micra 4/25, no longer requiring TVP (eventual candidate for CRT per EP)  - changes noted on telemetry requiring EP f/u and possible interrogation of device  - c/w ASA, atorvastatin     # HFrEF  - EF 25%  - Holding GDMT while on pressors    /RENAL  #ESRD  - from ATN and contrast nephropathy  - c/w pressor support   - Strict I/Os  - Trend BMP, lytes  - Avoid nephrotoxins  - Nephro following- pt still remains fluid overloaded, start IHD today w/midodrine support  - dc CRRT for 24 hr rest, monitor for start of IHD possibly tomorrow  - bumex given x2 w/improving UOP    GI  - tolerating  diet    ENDO  # DM1  # DKA    - on  basal/bolus lantus 32 QHS, admelog dec to 8u TIDAC   - monitor POCT FS, goal 140-180    # Hypothyroidism  - c/w home levothyroxine 75mcg    #Cushing  # L adrenal nodule  -dexamethasone suppression test w/ am cortisol x2 completed, pt is pos for Cushing's disease, f/u outpt, recheck once critical illness resolves  -f/u [place] salivary cortisol      SKIN  # Acute generalized exanthematous pustulosis   - c/w calamine,  Vaseline    # Psoriasis   # Lymphedema   # R. Diabetic foot ulcer  - Elevate legs  - Compression stockings, ACE wrapping  - Podiatry following- foot wounds management, decubiti precautions f/u outpt, check for iodine gel allergy    ID  #leukocytosis  - afebrile, bcx neg  - Monitor WBC and for fevers, and signs of infection    # Oral Candidiasis  - c/w nystatin     Heme  #Anemia, worsening  -AARTI vs AOCD vs anemia 2/2 blood loss vs myelosuppression iso critical illness  -trend H&H, transfuse PRN    #thrombocytopenia  -  myelosuppression iso critical illness  - no active signs of bleeding, monitor plts, transfuse PRN      Mobilize out of bed once off CRRT   Patient requires continuous monitoring with bedside rhythm monitoring, pulse ox monitoring, and intermittent blood gas analysis. Care plan discussed with ICU care team. Patient remained critical and at risk for life threatening decompensation.  Patient seen, examined and plan discussed with CCU team during rounds.    Assessment:  73F PMH: HFrEF (EF 30%), AS, LBBB, HTN, DM1, CKD, hypothyroidism, chronic lymphedema, LELIA (3L home O2) p/w for syncope 2/2 severe AS, s/p TAVR 4/24. Course complicated by contrast induced allergic reaction (had CTA for TAVR eval) and contrast related renal failure (acute on chronic) requiring HD. TAVR c/b VT and vfib requiring shock and flash pulmonary edema causing AHRF requiring intubation. Currently extubated,  on CVVPHD for fluid removal, and on pressor support for vasoplegia and hypovolemia due to CRRT.    NEURO:  - Extubated 4/26  - Currently AOx4  - OOBTC, c/w PT as tolerated  - Pt recommends HONEY    PULM  # AHRF  2/2 flash pulmonary edema  # LELIA (on 3L home oxygen)  - Extubated 4/26  - 4/27 CXR - no consolidation or pleural effusion  - on RA now    CV  #Shock state  -  vasoplegia from sedation vs losing fluid from CVVHD vs distributive septic shock  - on vasopressin 0.05, wean off as tolerated  - f/u US for IVC collapsibility    #VT/vfib s/p shock  #2:1 AV block  #Severe AS s/p TAVR  - Known LBBB with first degree AV block on telemetry  - s/p Micra 4/25, no longer requiring TVP (eventual candidate for CRT per EP)  - changes noted on telemetry requiring EP f/u and possible interrogation of device  - c/w ASA, atorvastatin     # HFrEF  - EF 25%  - Holding GDMT while on pressors    /RENAL  #ESRD  - from ATN and contrast nephropathy  - c/w pressor support   - Strict I/Os  - Trend BMP, lytes  - Avoid nephrotoxins  - Nephro following- pt still remains fluid overloaded, start IHD likely tomorrow  - start midodrine support 10 mg q8h  - dc CRRT for 24 hr rest, monitor for start of IHD possibly tomorrow  - bumex given x2 w/improving UOP yesterday  - start bumex drip 2mg/hr and monitor for UOP    GI  - tolerating  diet    ENDO  # DM1  # DKA    - on  basal/bolus lantus 32 QHS, admelog dec to 8u TIDAC   - monitor POCT FS, goal 140-180    # Hypothyroidism  - c/w home levothyroxine 75mcg    #Cushing  # L adrenal nodule  -dexamethasone suppression test w/ am cortisol x2 completed, pt is pos for Cushing's disease, f/u outpt, recheck once critical illness resolves  -f/u [place] salivary cortisol      SKIN  # Acute generalized exanthematous pustulosis   - c/w calamine,  Vaseline    # Psoriasis   # Lymphedema   # R. Diabetic foot ulcer  - Elevate legs  - Compression stockings, ACE wrapping  - Podiatry following- foot wounds management, decubiti precautions f/u outpt, check for iodine gel allergy    ID  #leukocytosis  - afebrile, bcx neg  - Monitor WBC and for fevers, and signs of infection    # Oral Candidiasis  - c/w nystatin     Heme  #Anemia, worsening  -AARTI vs AOCD vs anemia 2/2 blood loss vs myelosuppression iso critical illness  -trend H&H, transfuse PRN    #thrombocytopenia  -  myelosuppression iso critical illness  - no active signs of bleeding, monitor plts, transfuse PRN      Mobilize out of bed once off CRRT   Patient requires continuous monitoring with bedside rhythm monitoring, pulse ox monitoring, and intermittent blood gas analysis. Care plan discussed with ICU care team. Patient remained critical and at risk for life threatening decompensation.  Patient seen, examined and plan discussed with CCU team during rounds.

## 2024-05-03 NOTE — PROGRESS NOTE ADULT - SUBJECTIVE AND OBJECTIVE BOX
PROGRESS NOTE:   Authored by Dr. Jane Sandy  Patient is a 73y old  Female who presents with a chief complaint of Syncope (03 May 2024 08:14)        OVERNIGHT EVENTS: No acute overnight events. Pt fell while working with PT yesterday. Pt remains on vaso 0.05fo BP support. Bumex 4 mg given yesterday, pt UOP 175cc in 12h, bumex 4 given in am, with 395 cc in 12 h. Pt is ner neg 376 cc for last 24 h. Pt hypoglycemic at 75 at 10 pm, admelog dec from 9 to 8, pt finished all her meals.     SUBJECTIVE: Patient was seen and examined at bedside this morning.   Denies any nausea/vomiting/diarrhea, headache, shortness of breath, abdominal pain or chest pain/palpitations.   Patient responding appropriately to questions and able to make needs known.   Vital signs/imaging/labs/telemetry events reviewed.   No acute complaints or concerns. Pt is feeling well. Tolerating PO.  Stating she feels fine.     All other ROS neg except as above       MEDICATIONS  (STANDING):  ascorbic acid 500 milliGRAM(s) Oral daily  aspirin  chewable 81 milliGRAM(s) Oral daily  atorvastatin 80 milliGRAM(s) Oral at bedtime  buMETAnide Infusion 2 mG/Hr (10 mL/Hr) IV Continuous <Continuous>  buMETAnide IVPB 4 milliGRAM(s) IV Intermittent once  calamine/zinc oxide Lotion 1 Application(s) Topical three times a day  chlorhexidine 2% Cloths 1 Application(s) Topical daily  chlorhexidine 4% Liquid 1 Application(s) Topical <User Schedule>  epoetin mendoza-epbx (RETACRIT) Injectable 02829 Unit(s) IV Push <User Schedule>  ferrous    sulfate 325 milliGRAM(s) Oral two times a day  heparin   Injectable 5000 Unit(s) SubCutaneous every 8 hours  insulin glargine Injectable (LANTUS) 32 Unit(s) SubCutaneous at bedtime  insulin lispro (ADMELOG) corrective regimen sliding scale   SubCutaneous Before meals and at bedtime  insulin lispro Injectable (ADMELOG) 6 Unit(s) SubCutaneous three times a day before meals  levothyroxine 75 MICROGram(s) Oral daily  multivitamin 1 Tablet(s) Oral daily  mupirocin 2% Ointment 1 Application(s) Topical three times a day  nystatin    Suspension 341102 Unit(s) Oral four times a day  petrolatum white Ointment 1 Application(s) Topical three times a day  senna 2 Tablet(s) Oral at bedtime  vasopressin Infusion 0.02 Unit(s)/Min (3 mL/Hr) IV Continuous <Continuous>    MEDICATIONS  (PRN):  polyethylene glycol 3350 17 Gram(s) Oral two times a day PRN Constipation      CAPILLARY BLOOD GLUCOSE      POCT Blood Glucose.: 240 mg/dL (03 May 2024 07:56)  POCT Blood Glucose.: 114 mg/dL (02 May 2024 23:20)  POCT Blood Glucose.: 85 mg/dL (02 May 2024 22:06)  POCT Blood Glucose.: 75 mg/dL (02 May 2024 22:04)  POCT Blood Glucose.: 102 mg/dL (02 May 2024 16:57)  POCT Blood Glucose.: 99 mg/dL (02 May 2024 12:09)    I&O's Summary    02 May 2024 07:01  -  03 May 2024 07:00  --------------------------------------------------------  IN: 1656 mL / OUT: 2032 mL / NET: -376 mL    03 May 2024 07:01  -  03 May 2024 10:22  --------------------------------------------------------  IN: 246 mL / OUT: 95 mL / NET: 151 mL        PHYSICAL EXAM:  Vital Signs Last 24 Hrs  T(C): 37.1 (03 May 2024 03:00), Max: 37.1 (03 May 2024 03:00)  T(F): 98.7 (03 May 2024 03:00), Max: 98.7 (03 May 2024 03:00)  HR: 94 (03 May 2024 06:45) (78 - 116)  BP: --  BP(mean): --  RR: 23 (03 May 2024 06:45) (8 - 47)  SpO2: 93% (03 May 2024 06:45) (91% - 100%)    Parameters below as of 03 May 2024 06:00  Patient On (Oxygen Delivery Method): room air        PHYSICAL EXAM:     GENERAL: NAD  HEAD:  Atraumatic, Normocephalic  EYES: EOMI, conjunctiva and sclera clear, no nystagmus noted  ENT: Moist mucous membranes  CHEST/LUNG: normal work of breathing, Clear to auscultation bilaterally; No rales, rhonchi, wheezing, or rubs. Unlabored respirations  HEART:  Regular rate and rhythm; No murmurs, rubs, or gallops, normal S1/S2  ABDOMEN: normal bowel sounds; Soft, nontender, nondistended, no organomegaly   EXTREMITIES:  extensive chronic edema in b/l LE, dec Peripheral Pulses, No clubbing, cyanosis  MSK: No gross deformities noted, ROM wnl   Neurological:  A&Ox3, no focal deficits   SKIN: dry peeling flakey pale,  scattered psoriatic plaques throughout extremities and torso, no blistering  or  drainage  BL LE soft plaques easily lifted w/ mechanical debridement , w/ open skin & blistering and scant drainage  Rt plantar foot w/ dark blister around callous  No odor, erythema, increased warmth, tenderness, induration, fluctuance, nor crepitus  PSYCH: Normal mood, affect       LABS:                        7.3    9.07  )-----------( 149      ( 03 May 2024 00:49 )             24.1     05-03    132<L>  |  100  |  18  ----------------------------<  138<H>  4.4   |  22  |  1.72<H>    Ca    8.1<L>      03 May 2024 00:49  Phos  3.9     05-03  Mg     2.2     05-03    TPro  6.0  /  Alb  3.0<L>  /  TBili  0.5  /  DBili  x   /  AST  18  /  ALT  11  /  AlkPhos  73  05-03              Tele Reviewed:    RADIOLOGY & ADDITIONAL TESTS:  Results Reviewed: yes  Imaging Personally Reviewed: yes  Electrocardiogram Personally Reviewed: yes

## 2024-05-03 NOTE — PROGRESS NOTE ADULT - SUBJECTIVE AND OBJECTIVE BOX
[facility] - Howard Kimble, Carlos Luong, Herman Alston  Office Number:  [phone]    Patient resting comfortably in bed in NAD.  Laying flat with no respiratory distress.    Remains on RA this morning  Remains on vaso 0.05 with a MAP of 58  [employer] PPM interrogated on 5/2 with normal PPM function    ROS: negative unless otherwise mentioned.    Telemetry:  ST with first degree    MEDICATIONS  (STANDING):  ascorbic acid 500 milliGRAM(s) Oral daily  aspirin  chewable 81 milliGRAM(s) Oral daily  atorvastatin 80 milliGRAM(s) Oral at bedtime  buMETAnide Infusion 2 mG/Hr (10 mL/Hr) IV Continuous <Continuous>  buMETAnide IVPB 4 milliGRAM(s) IV Intermittent once  calamine/zinc oxide Lotion 1 Application(s) Topical three times a day  chlorhexidine 2% Cloths 1 Application(s) Topical daily  chlorhexidine 4% Liquid 1 Application(s) Topical <User Schedule>  epoetin mendoza-epbx (RETACRIT) Injectable 57347 Unit(s) IV Push <User Schedule>  ferrous    sulfate 325 milliGRAM(s) Oral two times a day  heparin   Injectable 5000 Unit(s) SubCutaneous every 8 hours  insulin glargine Injectable (LANTUS) 32 Unit(s) SubCutaneous at bedtime  insulin lispro (ADMELOG) corrective regimen sliding scale   SubCutaneous Before meals and at bedtime  insulin lispro Injectable (ADMELOG) 6 Unit(s) SubCutaneous three times a day before meals  levothyroxine 75 MICROGram(s) Oral daily  multivitamin 1 Tablet(s) Oral daily  mupirocin 2% Ointment 1 Application(s) Topical three times a day  nystatin    Suspension 956454 Unit(s) Oral four times a day  petrolatum white Ointment 1 Application(s) Topical three times a day  senna 2 Tablet(s) Oral at bedtime  vasopressin Infusion 0.02 Unit(s)/Min (3 mL/Hr) IV Continuous <Continuous>    MEDICATIONS  (PRN):  polyethylene glycol 3350 17 Gram(s) Oral two times a day PRN Constipation      Allergies    contrast media (iodine-based) (Fever; Vomiting; Flushing; Hypotension; Rash; Stomach Upset)  Ativan (Rash; Urticaria)  penicillins (Hives (Mod to Severe); Anaphylaxis (Mod to Severe); Short breath (Mod to Severe); Angioedema (Mod to Severe))    Intolerances        Vital Signs Last 24 Hrs  T(C): 37.1 (03 May 2024 03:00), Max: 37.1 (03 May 2024 03:00)  T(F): 98.7 (03 May 2024 03:00), Max: 98.7 (03 May 2024 03:00)  HR: 94 (03 May 2024 06:45) (78 - 116)  BP: --  BP(mean): --  RR: 23 (03 May 2024 06:45) (8 - 47)  SpO2: 93% (03 May 2024 06:45) (91% - 100%)    Parameters below as of 03 May 2024 06:00  Patient On (Oxygen Delivery Method): room air        I&O's Summary    02 May 2024 07:01  -  03 May 2024 07:00  --------------------------------------------------------  IN: 1656 mL / OUT: 2032 mL / NET: -376 mL    03 May 2024 07:01  -  03 May 2024 10:40  --------------------------------------------------------  IN: 246 mL / OUT: 95 mL / NET: 151 mL        ON EXAM:    General: NAD, awake and alert, oriented x 3  HEENT: Mucous membranes are moist, anicteric  Lungs: Non-labored, breath sounds are clear bilaterally, No wheezing, rales or rhonchi  Cardiovascular: Regular, S1 and S2, no murmurs, rubs, or gallops  Gastrointestinal: Bowel Sounds present, soft, nontender.   Lymph: No peripheral edema. No lymphadenopathy.  Skin: No rashes or ulcers  Psych:  Mood & affect appropriate    LABS: All Labs Reviewed:                        7.3    9.07  )-----------( 149      ( 03 May 2024 00:49 )             24.1                         7.9    13.06 )-----------( 149      ( 02 May 2024 00:16 )             26.4                         8.1    10.45 )-----------( 145      ( 01 May 2024 16:35 )             26.3     03 May 2024 00:49    132    |  100    |  18     ----------------------------<  138    4.4     |  22     |  1.72   02 May 2024 18:53    132    |  101    |  15     ----------------------------<  85     4.5     |  20     |  1.48   02 May 2024 06:50    134    |  102    |  13     ----------------------------<  131    4.4     |  21     |  1.21     Ca    8.1        03 May 2024 00:49  Ca    8.1        02 May 2024 18:53  Ca    8.1        02 May 2024 06:50  Phos  3.9       03 May 2024 00:49  Phos  3.5       02 May 2024 18:53  Phos  2.1       02 May 2024 06:50  Mg     2.2       03 May 2024 00:49  Mg     2.3       02 May 2024 18:53  Mg     2.3       02 May 2024 06:50    TPro  6.0    /  Alb  3.0    /  TBili  0.5    /  DBili  x      /  AST  18     /  ALT  11     /  AlkPhos  73     03 May 2024 00:49  TPro  6.2    /  Alb  2.9    /  TBili  0.6    /  DBili  x      /  AST  21     /  ALT  12     /  AlkPhos  72     02 May 2024 18:53  TPro  6.2    /  Alb  3.0    /  TBili  0.8    /  DBili  x      /  AST  18     /  ALT  11     /  AlkPhos  75     02 May 2024 06:50          Blood Culture:

## 2024-05-03 NOTE — PROGRESS NOTE ADULT - ASSESSMENT
73F w HFrEF (EF 30%), mod AS, known LBBB, HTN, IDDM1, CKD, hypothyroidism, chronic lymphedema, suspected OHS/LELIA on 3L NC home O2 p/w 1 episode of syncope. Recent admission at \[facility]\"" (3/29-4/5) for ADHF and DUNCAN s/p diuresis, discharged with new home O2 3L NC suspecting OHS/LELIA pending outpatient w/u. Since discharge a week ago, she has been staying at home with   Pt had sob walking to car. Once in car, she was still breathing heavily. Partner noticed pt was not responding to verbal stimuli for 10-15sec and witnessed R arm jerking. Pt gained consciousness quickly without postictal symptoms. Pt went to Cardiologist Dr. Nathan who recommended pt to come to ED. No fever, cp, abd pain, n/v. Leg swelling is improved from prior. Pt on torsemide 40mg which she has been taking. Pt was discharged on 3LNC which she is currently on. Patient reports she did not take Farxiga that she was discharged on as she was concerned about side effects.     In ED, patient was found afebrile, hemodynamically stable, breathing well on 3L NC. Initial labs notable for mild hyponatremia, DUNCAN on CKD, elevated proBNP and elevated pCO2 both improved from prior.  pt also noticed with abn creatinine      1- CKD IV  with DUNCAN   2- CHF   3- lymphedema   4- severe AS  s/p tavr 4/24  5- syncope   6- hyperkalemia  7- hypotension         suspect ATN from hypotension along with contrast nephropathy   creatinine worsening requiring renal replacement therapy, HD initiated 4/18  fluid status improving   dc'd CRRT   has + uo attempt bumex drip 2mg hr   strict I/O  derm following for rash  pressor support  with vasopressin   retacrit [id number] U Tiw for anemia   d.w CCU team

## 2024-05-03 NOTE — PROGRESS NOTE ADULT - SUBJECTIVE AND OBJECTIVE BOX
EVELYN OLPEZ  MRN-[id number]  Patient is a 73y old  Female who presents with a chief complaint of Syncope (03 May 2024 15:54)    HPI:  73F w HFrEF (EF 30%), mod AS, known LBBB, HTN, IDDM1, CKD, hypothyroidism, chronic lymphedema, suspected OHS/LELIA on 3L NC home O2 p/w 1 episode of syncope. Recent admission at [facility] (3/29-4/5) for ADHF and DUNCAN s/p diuresis, discharged with new home O2 3L NC suspecting OHS/LELIA pending outpatient w/u. Since discharge a week ago, she has been staying at home with   Pt had sob walking to car. Once in car, she was still breathing heavily. Partner noticed pt was not responding to verbal stimuli for 10-15sec and witnessed R arm jerking. Pt gained consciousness quickly without postictal symptoms. Pt went to Cardiologist Dr. Nathan who recommended pt to come to ED. No fever, cp, abd pain, n/v. Leg swelling is improved from prior. Pt on torsemide 40mg which she has been taking. Pt was discharged on 3LNC which she is currently on. Patient reports she did not take Farxiga that she was discharged on as she was concerned about side effects.     In ED, patient was found afebrile, hemodynamically stable, breathing well on 3L NC. Initial labs notable for mild hyponatremia, DUNCAN on CKD, elevated proBNP and elevated pCO2 both improved from prior. (12 Apr 2024 16:31)      Hospital Course:    24 HOUR EVENTS:    REVIEW OF SYSTEMS:    CONSTITUTIONAL: No weakness, fevers or chills  EYES/ENT: No visual changes;  No vertigo or throat pain   NECK: No pain or stiffness  RESPIRATORY: No cough, wheezing, hemoptysis; No shortness of breath  CARDIOVASCULAR: No chest pain or palpitations  GASTROINTESTINAL: No abdominal or epigastric pain. No nausea, vomiting, or hematemesis; No diarrhea or constipation. No melena or hematochezia.  GENITOURINARY: No dysuria, frequency or hematuria  NEUROLOGICAL: No numbness or weakness  SKIN: No itching, rashes      ICU Vital Signs Last 24 Hrs  T(C): 36.7 (03 May 2024 19:00), Max: 37.1 (03 May 2024 03:00)  T(F): 98.1 (03 May 2024 19:00), Max: 98.7 (03 May 2024 03:00)  HR: 85 (03 May 2024 20:30) (78 - 112)  BP: --  BP(mean): --  ABP: 105/37 (03 May 2024 20:30) (69/52 - 124/49)  ABP(mean): 61 (03 May 2024 20:30) (49 - 81)  RR: 20 (03 May 2024 20:30) (10 - 47)  SpO2: 97% (03 May 2024 20:30) (90% - 99%)    O2 Parameters below as of 03 May 2024 20:00  Patient On (Oxygen Delivery Method): room air            CVP(mm Hg): --  CO: --  CI: --  PA: --  PA(mean): --  PA(direct): --  PCWP: --  LA: --  RA: --  SVR: --  SVRI: --  PVR: --  PVRI: --  I&O's Summary    02 May 2024 07:01  -  03 May 2024 07:00  --------------------------------------------------------  IN: 1656 mL / OUT: 2032 mL / NET: -376 mL    03 May 2024 07:01  -  03 May 2024 20:36  --------------------------------------------------------  IN: 920 mL / OUT: 555 mL / NET: 365 mL        CAPILLARY BLOOD GLUCOSE    CAPILLARY BLOOD GLUCOSE      POCT Blood Glucose.: 199 mg/dL (03 May 2024 16:20)      PHYSICAL EXAM:  GENERAL: No acute distress, well-developed  HEAD:  Atraumatic, Normocephalic  EYES: EOMI, PERRLA, conjunctiva and sclera clear  NECK: Supple, no lymphadenopathy, no JVD  CHEST/LUNG: CTAB; No wheezes, rales, or rhonchi  HEART: Regular rate and rhythm. Normal S1/S2. No murmurs, rubs, or gallops  ABDOMEN: Soft, non-tender, non-distended; normal bowel sounds, no organomegaly  EXTREMITIES:  2+ peripheral pulses b/l, No clubbing, cyanosis, or edema  NEUROLOGY: A&O x 3, no focal deficits  SKIN: No rashes or lesions    ============================I/O===========================   I&O's Detail    02 May 2024 07:01  -  03 May 2024 07:00  --------------------------------------------------------  IN:    IV PiggyBack: 425 mL    IV PiggyBack: 100 mL    Oral Fluid: 960 mL    Vasopressin: 171 mL  Total IN: 1656 mL    OUT:    Indwelling Catheter - Urethral (mL): 595 mL    Other (mL): 1437 mL  Total OUT: 2032 mL    Total NET: -376 mL      03 May 2024 07:01  -  03 May 2024 20:36  --------------------------------------------------------  IN:    Bumetanide: 90 mL    IV PiggyBack: 50 mL    Oral Fluid: 720 mL    Vasopressin: 60 mL  Total IN: 920 mL    OUT:    Indwelling Catheter - Urethral (mL): 555 mL  Total OUT: 555 mL    Total NET: 365 mL        ============================ LABS =========================                        7.4    8.73  )-----------( 174      ( 03 May 2024 17:06 )             24.1     05-03    130<L>  |  96  |  25<H>  ----------------------------<  201<H>  4.8   |  19<L>  |  2.26<H>    Ca    8.4      03 May 2024 17:06  Phos  4.2     05-03  Mg     2.1     05-03    TPro  6.3  /  Alb  3.0<L>  /  TBili  0.6  /  DBili  x   /  AST  18  /  ALT  11  /  AlkPhos  75  05-03                LIVER FUNCTIONS - ( 03 May 2024 17:06 )  Alb: 3.0 g/dL / Pro: 6.3 g/dL / ALK PHOS: 75 U/L / ALT: 11 U/L / AST: 18 U/L / GGT: x             ABG - ( 03 May 2024 17:02 )  pH, Arterial: 7.36  pH, Blood: x     /  pCO2: 40    /  pO2: 92    / HCO3: 23    / Base Excess: -2.6  /  SaO2: 98.7              Blood Gas Arterial, Lactate: 1.1 mmol/L (05-03-24 @ 17:02)  Blood Gas Arterial, Lactate: 0.9 mmol/L (05-03-24 @ 00:40)  Blood Gas Arterial, Lactate: 1.0 mmol/L (05-01-24 @ 01:08)    Urinalysis Basic - ( 03 May 2024 17:06 )    Color: x / Appearance: x / SG: x / pH: x  Gluc: 201 mg/dL / Ketone: x  / Bili: x / Urobili: x   Blood: x / Protein: x / Nitrite: x   Leuk Esterase: x / RBC: x / WBC x   Sq Epi: x / Non Sq Epi: x / Bacteria: x      ======================Micro/Rad/Cardio=================  Telemtry: Reviewed   EKG: Reviewed  CXR: Reviewed  Culture: Reviewed   Echo:   Cath:   ======================================================  PAST MEDICAL & SURGICAL HISTORY:  Hypertension      Diabetes      Lymphedema      H/O left bundle branch block      History of left bundle branch block (LBBB)      Systolic heart failure, chronic      Type 1 diabetes      H/O cataract  2020      History of surgical removal of meniscus of knee  left in 1971      Frozen shoulder  2000      H/O Achilles tendon repair  lengthened bilaterally, 2000      Fractured skull  1968  ====================ASSESSMENT ==============  73F PMH: HFrEF (EF 30%), AS, LBBB, HTN, DM1, CKD, hypothyroidism, chronic lymphedema, LELIA (3L home O2) p/w for syncope 2/2 severe AS, s/p TAVR 4/24. Course complicated by contrast induced allergic reaction (had CTA for TAVR eval) and contrast related renal failure (acute on chronic) requiring HD. TAVR c/b VT and vfib requiring shock and flash pulmonary edema causing AHRF requiring intubation. Currently extubated,  on CVVPHD for fluid removal, and on pressor support for vasoplegia and hypovolemia due to CRRT.    NEURO:  - Extubated 4/26  - Currently AOx4  - OOBTC, c/w PT as tolerated  - Pt recommends HONEY    PULM  # AHRF  2/2 flash pulmonary edema  # LELIA (on 3L home oxygen)  - Extubated 4/26  - 4/27 CXR - no consolidation or pleural effusion  - on RA now    CV  #Shock state  -  vasoplegia from sedation vs losing fluid from CVVHD vs distributive septic shock  - on vasopressin 0.05, wean off as tolerated  - f/u US for IVC collapsibility    #VT/vfib s/p shock  #2:1 AV block  #Severe AS s/p TAVR  - Known LBBB with first degree AV block on telemetry  - s/p Micra 4/25, no longer requiring TVP (eventual candidate for CRT per EP)  - changes noted on telemetry requiring EP f/u and possible interrogation of device  - c/w ASA, atorvastatin     # HFrEF  - EF 25%  - Holding GDMT while on pressors    /RENAL  #ESRD  - from ATN and contrast nephropathy  - c/w pressor support   - Strict I/Os  - Trend BMP, lytes  - Avoid nephrotoxins  - Nephro following- pt still remains fluid overloaded, start IHD likely tomorrow  - start midodrine support 10 mg q8h  - dc CRRT for 24 hr rest, monitor for start of IHD possibly tomorrow  - bumex given x2 w/improving UOP yesterday  - start bumex drip 2mg/hr and monitor for UOP    GI  - tolerating  diet    ENDO  # DM1  # DKA    - on  basal/bolus lantus 32 QHS, admelog dec to 8u TIDAC   - monitor POCT FS, goal 140-180    # Hypothyroidism  - c/w home levothyroxine 75mcg    #Cushing  # L adrenal nodule  -dexamethasone suppression test w/ am cortisol x2 completed, pt is pos for Cushing's disease, f/u outpt, recheck once critical illness resolves  -f/u [place] salivary cortisol      SKIN  # Acute generalized exanthematous pustulosis   - c/w calamine,  Vaseline    # Psoriasis   # Lymphedema   # R. Diabetic foot ulcer  - Elevate legs  - Compression stockings, ACE wrapping  - Podiatry following- foot wounds management, decubiti precautions f/u outpt, check for iodine gel allergy    ID  #leukocytosis  - afebrile, bcx neg  - Monitor WBC and for fevers, and signs of infection    # Oral Candidiasis  - c/w nystatin     Heme  #Anemia, worsening  -AARTI vs AOCD vs anemia 2/2 blood loss vs myelosuppression iso critical illness  -trend H&H, transfuse PRN    #thrombocytopenia  -  myelosuppression iso critical illness  - no active signs of bleeding, monitor plts, transfuse PRN    Patient requires continuous monitoring with bedside rhythm monitoring, pulse ox monitoring, and intermittent blood gas analysis. Care plan discussed with ICU care team. Patient remained critical and at risk for life threatening decompensation.  Patient seen, examined and plan discussed with CCU team during rounds.     I have personally provided ____ minutes of critical care time excluding time spent on separate procedures, in addition to initial critical care time provided by the CICU Attending, Dr. Olivas.      By signing my name below, I, Susan Lockett, attest that this documentation has been prepared under the direction and in the presence of MAGDALENA Graham.   Electronically signed: Madison Pires, 05-03-24 @ 20:36    I, Georgia Doshi , personally performed the services described in this documentation. all medical record entries made by the heraclioibalejandro were at my direction and in my presence. I have reviewed the chart and agree that the record reflects my personal performance and is accurate and complete  Electronically signed: TABATHA Graham.        EVELYN LOPEZ  MRN-[id number]  Patient is a 73y old  Female who presents with a chief complaint of Syncope (03 May 2024 15:54)    HPI:  73F w HFrEF (EF 30%), mod AS, known LBBB, HTN, IDDM1, CKD, hypothyroidism, chronic lymphedema, suspected OHS/LELIA on 3L NC home O2 p/w 1 episode of syncope. Recent admission at [facility] (3/29-4/5) for ADHF and DUNCAN s/p diuresis, discharged with new home O2 3L NC suspecting OHS/LELIA pending outpatient w/u. Since discharge a week ago, she has been staying at home with   Pt had sob walking to car. Once in car, she was still breathing heavily. Partner noticed pt was not responding to verbal stimuli for 10-15sec and witnessed R arm jerking. Pt gained consciousness quickly without postictal symptoms. Pt went to Cardiologist Dr. Nathan who recommended pt to come to ED. No fever, cp, abd pain, n/v. Leg swelling is improved from prior. Pt on torsemide 40mg which she has been taking. Pt was discharged on 3LNC which she is currently on. Patient reports she did not take Farxiga that she was discharged on as she was concerned about side effects.   In ED, patient was found afebrile, hemodynamically stable, breathing well on 3L NC. Initial labs notable for mild hyponatremia, DUNCAN on CKD, elevated proBNP and elevated pCO2 both improved from prior. (12 Apr 2024 16:31)    24 HOUR EVENTS:  -     REVIEW OF SYSTEMS:    CONSTITUTIONAL: No weakness, fevers or chills  EYES/ENT: No visual changes;  No vertigo or throat pain   NECK: No pain or stiffness  RESPIRATORY: No cough, wheezing, hemoptysis; No shortness of breath  CARDIOVASCULAR: No chest pain or palpitations  GASTROINTESTINAL: No abdominal or epigastric pain. No nausea, vomiting, or hematemesis; No diarrhea or constipation. No melena or hematochezia.  GENITOURINARY: No dysuria, frequency or hematuria  NEUROLOGICAL: No numbness or weakness  SKIN: No itching, rashes      ICU Vital Signs Last 24 Hrs  T(C): 36.7 (03 May 2024 19:00), Max: 37.1 (03 May 2024 03:00)  T(F): 98.1 (03 May 2024 19:00), Max: 98.7 (03 May 2024 03:00)  HR: 85 (03 May 2024 20:30) (78 - 112)  BP: --  BP(mean): --  ABP: 105/37 (03 May 2024 20:30) (69/52 - 124/49)  ABP(mean): 61 (03 May 2024 20:30) (49 - 81)  RR: 20 (03 May 2024 20:30) (10 - 47)  SpO2: 97% (03 May 2024 20:30) (90% - 99%)    O2 Parameters below as of 03 May 2024 20:00  Patient On (Oxygen Delivery Method): room air            CVP(mm Hg): --  CO: --  CI: --  PA: --  PA(mean): --  PA(direct): --  PCWP: --  LA: --  RA: --  SVR: --  SVRI: --  PVR: --  PVRI: --  I&O's Summary    02 May 2024 07:01  -  03 May 2024 07:00  --------------------------------------------------------  IN: 1656 mL / OUT: 2032 mL / NET: -376 mL    03 May 2024 07:01  -  03 May 2024 20:36  --------------------------------------------------------  IN: 920 mL / OUT: 555 mL / NET: 365 mL        CAPILLARY BLOOD GLUCOSE    CAPILLARY BLOOD GLUCOSE      POCT Blood Glucose.: 199 mg/dL (03 May 2024 16:20)      PHYSICAL EXAM:  GENERAL: No acute distress, well-developed  HEAD:  Atraumatic, Normocephalic  EYES: EOMI, PERRLA, conjunctiva and sclera clear  NECK: Supple, no lymphadenopathy, no JVD  CHEST/LUNG: CTAB; No wheezes, rales, or rhonchi  HEART: Regular rate and rhythm. Normal S1/S2. No murmurs, rubs, or gallops  ABDOMEN: Soft, non-tender, non-distended; normal bowel sounds, no organomegaly  EXTREMITIES:  2+ peripheral pulses b/l, No clubbing, cyanosis, or edema  NEUROLOGY: A&O x 3, no focal deficits  SKIN: No rashes or lesions    ============================I/O===========================   I&O's Detail    02 May 2024 07:01  -  03 May 2024 07:00  --------------------------------------------------------  IN:    IV PiggyBack: 425 mL    IV PiggyBack: 100 mL    Oral Fluid: 960 mL    Vasopressin: 171 mL  Total IN: 1656 mL    OUT:    Indwelling Catheter - Urethral (mL): 595 mL    Other (mL): 1437 mL  Total OUT: 2032 mL    Total NET: -376 mL      03 May 2024 07:01  -  03 May 2024 20:36  --------------------------------------------------------  IN:    Bumetanide: 90 mL    IV PiggyBack: 50 mL    Oral Fluid: 720 mL    Vasopressin: 60 mL  Total IN: 920 mL    OUT:    Indwelling Catheter - Urethral (mL): 555 mL  Total OUT: 555 mL    Total NET: 365 mL        ============================ LABS =========================                        7.4    8.73  )-----------( 174      ( 03 May 2024 17:06 )             24.1     05-03    130<L>  |  96  |  25<H>  ----------------------------<  201<H>  4.8   |  19<L>  |  2.26<H>    Ca    8.4      03 May 2024 17:06  Phos  4.2     05-03  Mg     2.1     05-03    TPro  6.3  /  Alb  3.0<L>  /  TBili  0.6  /  DBili  x   /  AST  18  /  ALT  11  /  AlkPhos  75  05-03                LIVER FUNCTIONS - ( 03 May 2024 17:06 )  Alb: 3.0 g/dL / Pro: 6.3 g/dL / ALK PHOS: 75 U/L / ALT: 11 U/L / AST: 18 U/L / GGT: x             ABG - ( 03 May 2024 17:02 )  pH, Arterial: 7.36  pH, Blood: x     /  pCO2: 40    /  pO2: 92    / HCO3: 23    / Base Excess: -2.6  /  SaO2: 98.7              Blood Gas Arterial, Lactate: 1.1 mmol/L (05-03-24 @ 17:02)  Blood Gas Arterial, Lactate: 0.9 mmol/L (05-03-24 @ 00:40)  Blood Gas Arterial, Lactate: 1.0 mmol/L (05-01-24 @ 01:08)    Urinalysis Basic - ( 03 May 2024 17:06 )    Color: x / Appearance: x / SG: x / pH: x  Gluc: 201 mg/dL / Ketone: x  / Bili: x / Urobili: x   Blood: x / Protein: x / Nitrite: x   Leuk Esterase: x / RBC: x / WBC x   Sq Epi: x / Non Sq Epi: x / Bacteria: x      ======================Micro/Rad/Cardio=================  Telemtry: Reviewed   EKG: Reviewed  CXR: Reviewed  Culture: Reviewed   Echo:   Cath:   ======================================================  PAST MEDICAL & SURGICAL HISTORY:  Hypertension      Diabetes      Lymphedema      H/O left bundle branch block      History of left bundle branch block (LBBB)      Systolic heart failure, chronic      Type 1 diabetes      H/O cataract  2020      History of surgical removal of meniscus of knee  left in 1971      Frozen shoulder  2000      H/O Achilles tendon repair  lengthened bilaterally, 2000      Fractured skull  1968  ====================ASSESSMENT ==============  73F PMH: HFrEF (EF 30%), AS, LBBB, HTN, DM1, CKD, hypothyroidism, chronic lymphedema, LELIA (3L home O2) p/w for syncope 2/2 severe AS, s/p TAVR 4/24. Course complicated by contrast induced allergic reaction (had CTA for TAVR eval) and contrast related renal failure (acute on chronic) requiring HD. TAVR c/b VT and vfib requiring shock and flash pulmonary edema causing AHRF requiring intubation. Currently extubated,  on CVVPHD for fluid removal, and on pressor support for vasoplegia and hypovolemia due to CRRT.    NEURO:  - Extubated 4/26  - Currently AOx4  - OOBTC, c/w PT as tolerated  - Pt recommends HONEY    PULM  # AHRF  2/2 flash pulmonary edema  # LELIA (on 3L home oxygen)  - Extubated 4/26  - 4/27 CXR - no consolidation or pleural effusion  - on RA now    CV  #Shock state  -  vasoplegia from sedation vs losing fluid from CVVHD vs distributive septic shock  - on vasopressin 0.05, wean off as tolerated  - f/u US for IVC collapsibility    #VT/vfib s/p shock  #2:1 AV block  #Severe AS s/p TAVR  - Known LBBB with first degree AV block on telemetry  - s/p Micra 4/25, no longer requiring TVP (eventual candidate for CRT per EP)  - changes noted on telemetry requiring EP f/u and possible interrogation of device  - c/w ASA, atorvastatin     # HFrEF  - EF 25%  - Holding GDMT while on pressors    /RENAL  #ESRD  - from ATN and contrast nephropathy  - c/w pressor support   - Strict I/Os  - Trend BMP, lytes  - Avoid nephrotoxins  - Nephro following- pt still remains fluid overloaded, start IHD likely tomorrow  - start midodrine support 10 mg q8h  - dc CRRT for 24 hr rest, monitor for start of IHD possibly tomorrow  - bumex given x2 w/improving UOP yesterday  - start bumex drip 2mg/hr and monitor for UOP    GI  - tolerating  diet    ENDO  # DM1  # DKA    - on  basal/bolus lantus 32 QHS, admelog dec to 8u TIDAC   - monitor POCT FS, goal 140-180    # Hypothyroidism  - c/w home levothyroxine 75mcg    #Cushing  # L adrenal nodule  -dexamethasone suppression test w/ am cortisol x2 completed, pt is pos for Cushing's disease, f/u outpt, recheck once critical illness resolves  -f/u [place] salivary cortisol      SKIN  # Acute generalized exanthematous pustulosis   - c/w calamine,  Vaseline    # Psoriasis   # Lymphedema   # R. Diabetic foot ulcer  - Elevate legs  - Compression stockings, ACE wrapping  - Podiatry following- foot wounds management, decubiti precautions f/u outpt, check for iodine gel allergy    ID  #leukocytosis  - afebrile, bcx neg  - Monitor WBC and for fevers, and signs of infection    # Oral Candidiasis  - c/w nystatin     Heme  #Anemia, worsening  -AARTI vs AOCD vs anemia 2/2 blood loss vs myelosuppression iso critical illness  -trend H&H, transfuse PRN    #thrombocytopenia  -  myelosuppression iso critical illness  - no active signs of bleeding, monitor plts, transfuse PRN    Patient requires continuous monitoring with bedside rhythm monitoring, pulse ox monitoring, and intermittent blood gas analysis. Care plan discussed with ICU care team. Patient remained critical and at risk for life threatening decompensation.  Patient seen, examined and plan discussed with CCU team during rounds.     I have personally provided ____ minutes of critical care time excluding time spent on separate procedures, in addition to initial critical care time provided by the CICU Attending, Dr. Olivas.      By signing my name below, I, Susan Lockett, attest that this documentation has been prepared under the direction and in the presence of MAGDALENA Graham.   Electronically signed: Madison Pires, 05-03-24 @ 20:36    I, Georgia Doshi , personally performed the services described in this documentation. all medical record entries made by the heraclioibe were at my direction and in my presence. I have reviewed the chart and agree that the record reflects my personal performance and is accurate and complete  Electronically signed: TABATHA Graham.        EVELYN LOPEZ  MRN-[id number]  Patient is a 73y old  Female who presents with a chief complaint of Syncope (03 May 2024 15:54)    HPI:  73F w HFrEF (EF 30%), mod AS, known LBBB, HTN, IDDM1, CKD, hypothyroidism, chronic lymphedema, suspected OHS/LELIA on 3L NC home O2 p/w 1 episode of syncope. Recent admission at [facility] (3/29-4/5) for ADHF and DUNCAN s/p diuresis, discharged with new home O2 3L NC suspecting OHS/LELIA pending outpatient w/u. Since discharge a week ago, she has been staying at home with   Pt had sob walking to car. Once in car, she was still breathing heavily. Partner noticed pt was not responding to verbal stimuli for 10-15sec and witnessed R arm jerking. Pt gained consciousness quickly without postictal symptoms. Pt went to Cardiologist Dr. Nathan who recommended pt to come to ED. No fever, cp, abd pain, n/v. Leg swelling is improved from prior. Pt on torsemide 40mg which she has been taking. Pt was discharged on 3LNC which she is currently on. Patient reports she did not take Farxiga that she was discharged on as she was concerned about side effects.   In ED, patient was found afebrile, hemodynamically stable, breathing well on 3L NC. Initial labs notable for mild hyponatremia, DUNCAN on CKD, elevated proBNP and elevated pCO2 both improved from prior. (12 Apr 2024 16:31)    24 HOUR EVENTS:  - Remains on vaso .01 & Bumex gtt at 2    REVIEW OF SYSTEMS:    CONSTITUTIONAL: No weakness, fevers or chills  EYES/ENT: No visual changes;  No vertigo or throat pain   NECK: No pain or stiffness  RESPIRATORY: No cough, wheezing, hemoptysis; No shortness of breath  CARDIOVASCULAR: No chest pain or palpitations  GASTROINTESTINAL: No abdominal or epigastric pain. No nausea, vomiting, or hematemesis; No diarrhea or constipation. No melena or hematochezia.  GENITOURINARY: No dysuria, frequency or hematuria  NEUROLOGICAL: No numbness or weakness  SKIN: No itching, rashes      ICU Vital Signs Last 24 Hrs  T(C): 36.7 (03 May 2024 19:00), Max: 37.1 (03 May 2024 03:00)  T(F): 98.1 (03 May 2024 19:00), Max: 98.7 (03 May 2024 03:00)  HR: 85 (03 May 2024 20:30) (78 - 112)  BP: --  BP(mean): --  ABP: 105/37 (03 May 2024 20:30) (69/52 - 124/49)  ABP(mean): 61 (03 May 2024 20:30) (49 - 81)  RR: 20 (03 May 2024 20:30) (10 - 47)  SpO2: 97% (03 May 2024 20:30) (90% - 99%)    O2 Parameters below as of 03 May 2024 20:00  Patient On (Oxygen Delivery Method): room air            CVP(mm Hg): --  CO: --  CI: --  PA: --  PA(mean): --  PA(direct): --  PCWP: --  LA: --  RA: --  SVR: --  SVRI: --  PVR: --  PVRI: --  I&O's Summary    02 May 2024 07:01  -  03 May 2024 07:00  --------------------------------------------------------  IN: 1656 mL / OUT: 2032 mL / NET: -376 mL    03 May 2024 07:01  -  03 May 2024 20:36  --------------------------------------------------------  IN: 920 mL / OUT: 555 mL / NET: 365 mL        CAPILLARY BLOOD GLUCOSE    CAPILLARY BLOOD GLUCOSE      POCT Blood Glucose.: 199 mg/dL (03 May 2024 16:20)      PHYSICAL EXAM:  GENERAL: No acute distress, well-developed  HEAD:  Atraumatic, Normocephalic  EYES: EOMI, PERRLA, conjunctiva and sclera clear  NECK: Supple, no lymphadenopathy, no JVD  CHEST/LUNG: CTAB; No wheezes, rales, or rhonchi  HEART: Regular rate and rhythm. Normal S1/S2. No murmurs, rubs, or gallops  ABDOMEN: Soft, non-tender, non-distended; normal bowel sounds, no organomegaly  EXTREMITIES:  2+ peripheral pulses b/l, No clubbing, cyanosis, or edema  NEUROLOGY: A&O x 3, no focal deficits  SKIN: No rashes or lesions    ============================I/O===========================   I&O's Detail    02 May 2024 07:01  -  03 May 2024 07:00  --------------------------------------------------------  IN:    IV PiggyBack: 425 mL    IV PiggyBack: 100 mL    Oral Fluid: 960 mL    Vasopressin: 171 mL  Total IN: 1656 mL    OUT:    Indwelling Catheter - Urethral (mL): 595 mL    Other (mL): 1437 mL  Total OUT: 2032 mL    Total NET: -376 mL      03 May 2024 07:01  -  03 May 2024 20:36  --------------------------------------------------------  IN:    Bumetanide: 90 mL    IV PiggyBack: 50 mL    Oral Fluid: 720 mL    Vasopressin: 60 mL  Total IN: 920 mL    OUT:    Indwelling Catheter - Urethral (mL): 555 mL  Total OUT: 555 mL    Total NET: 365 mL        ============================ LABS =========================                        7.4    8.73  )-----------( 174      ( 03 May 2024 17:06 )             24.1     05-03    130<L>  |  96  |  25<H>  ----------------------------<  201<H>  4.8   |  19<L>  |  2.26<H>    Ca    8.4      03 May 2024 17:06  Phos  4.2     05-03  Mg     2.1     05-03    TPro  6.3  /  Alb  3.0<L>  /  TBili  0.6  /  DBili  x   /  AST  18  /  ALT  11  /  AlkPhos  75  05-03                LIVER FUNCTIONS - ( 03 May 2024 17:06 )  Alb: 3.0 g/dL / Pro: 6.3 g/dL / ALK PHOS: 75 U/L / ALT: 11 U/L / AST: 18 U/L / GGT: x             ABG - ( 03 May 2024 17:02 )  pH, Arterial: 7.36  pH, Blood: x     /  pCO2: 40    /  pO2: 92    / HCO3: 23    / Base Excess: -2.6  /  SaO2: 98.7              Blood Gas Arterial, Lactate: 1.1 mmol/L (05-03-24 @ 17:02)  Blood Gas Arterial, Lactate: 0.9 mmol/L (05-03-24 @ 00:40)  Blood Gas Arterial, Lactate: 1.0 mmol/L (05-01-24 @ 01:08)    Urinalysis Basic - ( 03 May 2024 17:06 )    Color: x / Appearance: x / SG: x / pH: x  Gluc: 201 mg/dL / Ketone: x  / Bili: x / Urobili: x   Blood: x / Protein: x / Nitrite: x   Leuk Esterase: x / RBC: x / WBC x   Sq Epi: x / Non Sq Epi: x / Bacteria: x      ======================Micro/Rad/Cardio=================  Telemtry: Reviewed   EKG: Reviewed  CXR: Reviewed  Culture: Reviewed   Echo:   Cath:   ======================================================  PAST MEDICAL & SURGICAL HISTORY:  Hypertension      Diabetes      Lymphedema      H/O left bundle branch block      History of left bundle branch block (LBBB)      Systolic heart failure, chronic      Type 1 diabetes      H/O cataract  2020      History of surgical removal of meniscus of knee  left in 1971      Frozen shoulder  2000      H/O Achilles tendon repair  lengthened bilaterally, 2000      Fractured skull  1968  ====================ASSESSMENT ==============  73F PMH: HFrEF (EF 30%), AS, LBBB, HTN, DM1, CKD, hypothyroidism, chronic lymphedema, LELIA (3L home O2) p/w for syncope 2/2 severe AS, s/p TAVR 4/24. Course complicated by contrast induced allergic reaction (had CTA for TAVR eval) and contrast related renal failure (acute on chronic) requiring HD. TAVR c/b VT and vfib requiring shock and flash pulmonary edema causing AHRF requiring intubation. Currently extubated,  on CVVPHD for fluid removal, and on pressor support for vasoplegia and hypovolemia due to CRRT.    NEURO:  - Extubated 4/26  - Currently AOx4  - OOBTC, c/w PT as tolerated  - Pt recommends HONEY    PULM  # AHRF  2/2 flash pulmonary edema  # LELIA (on 3L home oxygen)  - Extubated 4/26  - 4/27 CXR - no consolidation or pleural effusion  - on RA now    CV  #Shock state  -  vasoplegia from sedation vs losing fluid from CVVHD vs distributive septic shock  - on vasopressin 0.05, wean off as tolerated  - f/u US for IVC collapsibility    #VT/vfib s/p shock  #2:1 AV block  #Severe AS s/p TAVR  - Known LBBB with first degree AV block on telemetry  - s/p Micra 4/25, no longer requiring TVP (eventual candidate for CRT per EP)  - changes noted on telemetry requiring EP f/u and possible interrogation of device  - c/w ASA, atorvastatin     # HFrEF  - EF 25%  - Holding GDMT while on pressors    /RENAL  #ESRD  - from ATN and contrast nephropathy  - c/w pressor support   - Strict I/Os  - Trend BMP, lytes  - Avoid nephrotoxins  - Nephro following- pt still remains fluid overloaded, start IHD likely tomorrow  - start midodrine support 10 mg q8h  - dc CRRT for 24 hr rest, monitor for start of IHD possibly tomorrow  - bumex given x2 w/improving UOP yesterday  - start bumex drip 2mg/hr and monitor for UOP    GI  - tolerating  diet    ENDO  # DM1  # DKA    - on  basal/bolus lantus 32 QHS, admelog dec to 8u TIDAC   - monitor POCT FS, goal 140-180    # Hypothyroidism  - c/w home levothyroxine 75mcg    #Cushing  # L adrenal nodule  -dexamethasone suppression test w/ am cortisol x2 completed, pt is pos for Cushing's disease, f/u outpt, recheck once critical illness resolves  -f/u [place] salivary cortisol      SKIN  # Acute generalized exanthematous pustulosis   - c/w calamine,  Vaseline    # Psoriasis   # Lymphedema   # R. Diabetic foot ulcer  - Elevate legs  - Compression stockings, ACE wrapping  - Podiatry following- foot wounds management, decubiti precautions f/u outpt, check for iodine gel allergy    ID  #leukocytosis  - afebrile, bcx neg  - Monitor WBC and for fevers, and signs of infection    # Oral Candidiasis  - c/w nystatin     Heme  #Anemia, worsening  -AARTI vs AOCD vs anemia 2/2 blood loss vs myelosuppression iso critical illness  -trend H&H, transfuse PRN    #thrombocytopenia  -  myelosuppression iso critical illness  - no active signs of bleeding, monitor plts, transfuse PRN    Patient requires continuous monitoring with bedside rhythm monitoring, pulse ox monitoring, and intermittent blood gas analysis. Care plan discussed with ICU care team. Patient remained critical and at risk for life threatening decompensation.  Patient seen, examined and plan discussed with CCU team during rounds.     I have personally provided ____ minutes of critical care time excluding time spent on separate procedures, in addition to initial critical care time provided by the CICU Attending, Dr. Olivas.      By signing my name below, I, Susan Lockett, attest that this documentation has been prepared under the direction and in the presence of MAGDALENA Graham.   Electronically signed: Madison Pires, 05-03-24 @ 20:36    Georgia FANG , personally performed the services described in this documentation. all medical record entries made by the scribe were at my direction and in my presence. I have reviewed the chart and agree that the record reflects my personal performance and is accurate and complete  Electronically signed: TABATHA Graham.        EVELYN LOPEZ  MRN-[id number]  Patient is a 73y old  Female who presents with a chief complaint of Syncope (03 May 2024 15:54)    HPI:  73F w HFrEF (EF 30%), mod AS, known LBBB, HTN, IDDM1, CKD, hypothyroidism, chronic lymphedema, suspected OHS/LELIA on 3L NC home O2 p/w 1 episode of syncope. Recent admission [facility] (3/29-4/5) for ADHF and DUNCAN s/p diuresis, discharged with new home O2 3L NC suspecting OHS/LELIA pending outpatient w/u. Since discharge a week ago, she has been staying at home with   Pt had sob walking to car. Once in car, she was still breathing heavily. Partner noticed pt was not responding to verbal stimuli for 10-15sec and witnessed R arm jerking. Pt gained consciousness quickly without postictal symptoms. Pt went to Cardiologist Dr. Nathan who recommended pt to come to ED. No fever, cp, abd pain, n/v. Leg swelling is improved from prior. Pt on torsemide 40mg which she has been taking. Pt was discharged on 3LNC which she is currently on. Patient reports she did not take Farxiga that she was discharged on as she was concerned about side effects.   In ED, patient was found afebrile, hemodynamically stable, breathing well on 3L NC. Initial labs notable for mild hyponatremia, DUNCAN on CKD, elevated proBNP and elevated pCO2 both improved from prior. (12 Apr 2024 16:31)    24 HOUR EVENTS:  - Remains on vaso .01 & Bumex gtt at 2    REVIEW OF SYSTEMS:    CONSTITUTIONAL: No weakness, fevers or chills  EYES/ENT: No visual changes;  No vertigo or throat pain   NECK: No pain or stiffness  RESPIRATORY: No cough, wheezing, hemoptysis; No shortness of breath  CARDIOVASCULAR: No chest pain or palpitations  GASTROINTESTINAL: No abdominal or epigastric pain. No nausea, vomiting, or hematemesis; No diarrhea or constipation. No melena or hematochezia.  GENITOURINARY: No dysuria, frequency or hematuria  NEUROLOGICAL: No numbness or weakness  SKIN: No itching, rashes    ICU Vital Signs Last 24 Hrs  T(C): 36.7 (03 May 2024 19:00), Max: 37.1 (03 May 2024 03:00)  T(F): 98.1 (03 May 2024 19:00), Max: 98.7 (03 May 2024 03:00)  HR: 85 (03 May 2024 20:30) (78 - 112)  ABP: 105/37 (03 May 2024 20:30) (69/52 - 124/49)  ABP(mean): 61 (03 May 2024 20:30) (49 - 81)  RR: 20 (03 May 2024 20:30) (10 - 47)  SpO2: 97% (03 May 2024 20:30) (90% - 99%)    O2 Parameters below as of 03 May 2024 20:00  Patient On (Oxygen Delivery Method): room air    I&O's Summary    02 May 2024 07:01  -  03 May 2024 07:00  --------------------------------------------------------  IN: 1656 mL / OUT: 2032 mL / NET: -376 mL    03 May 2024 07:01  -  03 May 2024 20:36  --------------------------------------------------------  IN: 920 mL / OUT: 555 mL / NET: 365 mL    CAPILLARY BLOOD GLUCOSE  POCT Blood Glucose.: 199 mg/dL (03 May 2024 16:20)    PHYSICAL EXAM:  GENERAL: NAD  EYES: EOMI  CHEST/LUNG: CTAB; No wheezes, rales, or rhonchi  HEART: Regular rate and rhythm. Normal S1/S2  ABDOMEN: Soft, non-tender, non-distended  EXTREMITIES:  2+ peripheral pulses b/l  NEUROLOGY: A&O x 3, no focal deficits    ============================I/O===========================   I&O's Detail    02 May 2024 07:01  -  03 May 2024 07:00  --------------------------------------------------------  IN:    IV PiggyBack: 425 mL    IV PiggyBack: 100 mL    Oral Fluid: 960 mL    Vasopressin: 171 mL  Total IN: 1656 mL    OUT:    Indwelling Catheter - Urethral (mL): 595 mL    Other (mL): 1437 mL  Total OUT: 2032 mL    Total NET: -376 mL    03 May 2024 07:01  -  03 May 2024 20:36  --------------------------------------------------------  IN:    Bumetanide: 90 mL    IV PiggyBack: 50 mL    Oral Fluid: 720 mL    Vasopressin: 60 mL  Total IN: 920 mL    OUT:    Indwelling Catheter - Urethral (mL): 555 mL  Total OUT: 555 mL    Total NET: 365 mL    ============================ LABS =========================                      7.4    8.73  )-----------( 174      ( 03 May 2024 17:06 )             24.1     05-03  130<L>  |  96  |  25<H>  ----------------------------<  201<H>  4.8   |  19<L>  |  2.26<H>    Ca    8.4      03 May 2024 17:06  Phos  4.2     05-03  Mg     2.1     05-03  TPro  6.3  /  Alb  3.0<L>  /  TBili  0.6  /  DBili  x   /  AST  18  /  ALT  11  /  AlkPhos  75  05-03    LIVER FUNCTIONS - ( 03 May 2024 17:06 )  Alb: 3.0 g/dL / Pro: 6.3 g/dL / ALK PHOS: 75 U/L / ALT: 11 U/L / AST: 18 U/L / GGT: x           ABG - ( 03 May 2024 17:02 )  pH, Arterial: 7.36  pH, Blood: x     /  pCO2: 40    /  pO2: 92    / HCO3: 23    / Base Excess: -2.6  /  SaO2: 98.7      Blood Gas Arterial, Lactate: 1.1 mmol/L (05-03-24 @ 17:02)  Blood Gas Arterial, Lactate: 0.9 mmol/L (05-03-24 @ 00:40)  Blood Gas Arterial, Lactate: 1.0 mmol/L (05-01-24 @ 01:08)    Urinalysis Basic - ( 03 May 2024 17:06 )    Color: x / Appearance: x / SG: x / pH: x  Gluc: 201 mg/dL / Ketone: x  / Bili: x / Urobili: x   Blood: x / Protein: x / Nitrite: x   Leuk Esterase: x / RBC: x / WBC x   Sq Epi: x / Non Sq Epi: x / Bacteria: x    ======================================================  PAST MEDICAL & SURGICAL HISTORY:  Hypertension    Diabetes    Lymphedema    H/O left bundle branch block    History of left bundle branch block (LBBB)    Systolic heart failure, chronic    Type 1 diabetes    H/O cataract, 2020    History of surgical removal of meniscus of knee, left in 1971    Frozen shoulder, 2000    H/O Achilles tendon repair, lengthened bilaterally, 2000    Fractured skull, 1968    ====================ASSESSMENT ==============  73F PMH: HFrEF (EF 30%), AS, LBBB, HTN, DM1, CKD, hypothyroidism, chronic lymphedema, LELIA (3L home O2) p/w for syncope 2/2 severe AS, s/p TAVR 4/24. Course complicated by contrast induced allergic reaction (had CTA for TAVR eval) and contrast related renal failure (acute on chronic) requiring HD. TAVR c/b VT and vfib requiring shock and flash pulmonary edema causing AHRF requiring intubation. Currently extubated,  on CVVPHD for fluid removal, and on pressor support for vasoplegia and hypovolemia due to CRRT.    NEURO:  - Extubated 4/26  - Currently AOx4  - OOBTC, c/w PT as tolerated  - Pt recommends HONEY    PULM  # AHRF  2/2 flash pulmonary edema  # LELIA (on 3L home oxygen)  - Extubated 4/26  - 4/27 CXR - no consolidation or pleural effusion  - on RA now    CV  #Shock state  -  vasoplegia from sedation vs losing fluid from CVVHD vs distributive septic shock  - on vasopressin 0.05, wean off as tolerated  - f/u US for IVC collapsibility    #VT/vfib s/p shock  #2:1 AV block  #Severe AS s/p TAVR  - Known LBBB with first degree AV block on telemetry  - s/p Micra 4/25, no longer requiring TVP (eventual candidate for CRT per EP)  - changes noted on telemetry requiring EP f/u and possible interrogation of device  - c/w ASA, atorvastatin     # HFrEF  - EF 25%  - Holding GDMT while on pressors    /RENAL  #ESRD  - i/s/o ATN and contrast nephropathy  - c/w pressor support   - Strict I/Os  - Trend BMP, lytes  - Avoid nephrotoxins  - Nephro following- pt still remains fluid overloaded, start IHD likely tomorrow  - start midodrine support 10 mg q8h  - dc CRRT for 24 hr rest, monitor for start of IHD possibly tomorrow  - bumex given x2 w/improving UOP yesterday  - c/w bumex drip 2mg/hr and monitor for UOP    GI  - tolerating  diet    ENDO  # DM1  # DKA    - on  basal/bolus lantus 32 QHS, admelog dec to 8u TIDAC   - monitor POCT FS, goal 140-180    # Hypothyroidism  - c/w home levothyroxine 75mcg    #Cushing  # L adrenal nodule  -dexamethasone suppression test w/ am cortisol x2 completed, pt is pos for Cushing's disease, f/u outpt, recheck once critical illness resolves  -f/u [place] salivary cortisol  - pending CT A/P adrenal protocol      SKIN  # Acute generalized exanthematous pustulosis   - c/w calamine,  Vaseline    # Psoriasis   # Lymphedema   # R. Diabetic foot ulcer  - Elevate legs  - Compression stockings, ACE wrapping  - Podiatry following- foot wounds management, decubiti precautions f/u outpt, check for iodine gel allergy    ID  #leukocytosis  - afebrile, bcx neg  - Monitor WBC and for fevers, and signs of infection    # Oral Candidiasis  - c/w nystatin     Heme  #Anemia, worsening  -AARTI vs AOCD vs anemia 2/2 blood loss vs myelosuppression iso critical illness  -trend H&H, transfuse PRN    #thrombocytopenia  -  myelosuppression iso critical illness  - no active signs of bleeding, monitor plts, transfuse PRN    Patient requires continuous monitoring with bedside rhythm monitoring, pulse ox monitoring, and intermittent blood gas analysis. Care plan discussed with ICU care team. Patient remained critical and at risk for life threatening decompensation.  Patient seen, examined and plan discussed with CCU team during rounds.     I have personally provided 35 minutes of critical care time excluding time spent on separate procedures, in addition to initial critical care time provided by the CICU Attending, Dr. Olivas.      By signing my name below, I, Susan Lockett, attest that this documentation has been prepared under the direction and in the presence of MAGDALENA Graham.   Electronically signed: Lachelle Pires, 05-03-24 @ 20:36    I, Georgia Doshi , personally performed the services described in this documentation. all medical record entries made by the lachelle were at my direction and in my presence. I have reviewed the chart and agree that the record reflects my personal performance and is accurate and complete  Electronically signed: MAGDALENA Graham

## 2024-05-03 NOTE — PROGRESS NOTE ADULT - SUBJECTIVE AND OBJECTIVE BOX
[facility]   [phone]  RENAL FOLLOW UP NOTE  --------------------------------------------------------------------------------  Chief Complaint:    24 hour events/subjective:    seen earlier   c/o fatigue overall   denies sob     PAST HISTORY  --------------------------------------------------------------------------------  No significant changes to PMH, PSH, FHx, SHx, unless otherwise noted    ALLERGIES & MEDICATIONS  --------------------------------------------------------------------------------  Allergies    contrast media (iodine-based) (Fever; Vomiting; Flushing; Hypotension; Rash; Stomach Upset)  Ativan (Rash; Urticaria)  penicillins (Hives (Mod to Severe); Anaphylaxis (Mod to Severe); Short breath (Mod to Severe); Angioedema (Mod to Severe))    Intolerances      Standing Inpatient Medications  ascorbic acid 500 milliGRAM(s) Oral daily  aspirin  chewable 81 milliGRAM(s) Oral daily  atorvastatin 80 milliGRAM(s) Oral at bedtime  buMETAnide Infusion 2 mG/Hr IV Continuous <Continuous>  calamine/zinc oxide Lotion 1 Application(s) Topical three times a day  chlorhexidine 2% Cloths 1 Application(s) Topical daily  chlorhexidine 4% Liquid 1 Application(s) Topical <User Schedule>  epoetin mendoza-epbx (RETACRIT) Injectable 22327 Unit(s) IV Push <User Schedule>  ferrous    sulfate 325 milliGRAM(s) Oral two times a day  heparin   Injectable 5000 Unit(s) SubCutaneous every 8 hours  insulin glargine Injectable (LANTUS) 32 Unit(s) SubCutaneous at bedtime  insulin lispro (ADMELOG) corrective regimen sliding scale   SubCutaneous Before meals and at bedtime  insulin lispro Injectable (ADMELOG) 8 Unit(s) SubCutaneous three times a day before meals  levothyroxine 75 MICROGram(s) Oral daily  midodrine 10 milliGRAM(s) Oral every 8 hours  multivitamin 1 Tablet(s) Oral daily  mupirocin 2% Ointment 1 Application(s) Topical three times a day  nystatin    Suspension 768085 Unit(s) Oral four times a day  petrolatum white Ointment 1 Application(s) Topical three times a day  senna 2 Tablet(s) Oral at bedtime  vasopressin Infusion 0.02 Unit(s)/Min IV Continuous <Continuous>    PRN Inpatient Medications  polyethylene glycol 3350 17 Gram(s) Oral two times a day PRN      REVIEW OF SYSTEMS  --------------------------------------------------------------------------------    Gen: denies  fevers/chills,  CVS: denies chest pain/palpitations  Resp: denies SOB/Cough  GI: Denies N/V/Abd pain  : Denies dysuria    VITALS/PHYSICAL EXAM  --------------------------------------------------------------------------------  T(C): 36.7 (05-03-24 @ 19:00), Max: 37.1 (05-03-24 @ 03:00)  HR: 105 (05-03-24 @ 21:30) (78 - 112)  BP: --  RR: 19 (05-03-24 @ 21:30) (14 - 47)  SpO2: 94% (05-03-24 @ 21:30) (90% - 99%)  Wt(kg): --        05-02-24 @ 07:01  -  05-03-24 @ 07:00  --------------------------------------------------------  IN: 1656 mL / OUT: 2032 mL / NET: -376 mL    05-03-24 @ 07:01  -  05-03-24 @ 21:49  --------------------------------------------------------  IN: 936 mL / OUT: 755 mL / NET: 181 mL      Physical Exam:  	    Gen:  on RA, facial erythema, improving   	Pulm: decrease bs  no rales or ronchi or wheezing  	CV: No JVD. RRR, S1S2; no rub II/VI M   	Abd: +BS, soft, nontender/nondistended, obese   	UE: Warm, no cyanosis  no clubbing,   +  edema  	LE: Warm, no cyanosis  no clubbing, 3+ softer  edema, B/L   	Neuro: alert and oriented               Vascular Access: +  non tunneled HD catheter      LABS/STUDIES  --------------------------------------------------------------------------------              7.4    8.73  >-----------<  174      [05-03-24 @ 17:06]              24.1     130  |  96  |  25  ----------------------------<  201      [05-03-24 @ 17:06]  4.8   |  19  |  2.26        Ca     8.4     [05-03-24 @ 17:06]      Mg     2.1     [05-03-24 @ 17:06]      Phos  4.2     [05-03-24 @ 17:06]    TPro  6.3  /  Alb  3.0  /  TBili  0.6  /  DBili  x   /  AST  18  /  ALT  11  /  AlkPhos  75  [05-03-24 @ 17:06]          Creatinine Trend:  SCr 2.26 [05-03 @ 17:06]  SCr 1.72 [05-03 @ 00:49]  SCr 1.48 [05-02 @ 18:53]  SCr 1.21 [05-02 @ 06:50]  SCr 1.28 [05-02 @ 00:16]      PTH -- (Ca 8.4)      [04-19-24 @ 17:54]   109  TSH 5.06      [04-14-24 @ 07:18]  Lipid: chol 74, TG 70, HDL 33, LDL --      [04-13-24 @ 07:05]

## 2024-05-03 NOTE — PROGRESS NOTE ADULT - ATTENDING COMMENTS
Agree with assessment and plan as above by Dr. Enamorado. Reviewed all pertinent labs, glucose values, and imaging studies. Modifications made as indicated above. Pt. with Type 1 DM on basal bolus regimen with plan to increase prandial insulin slightly to 8 units. Monitor on Lantus 32 units daily. Adrenal nodule workup negative for pheo and hyperaldo, but concern for possible cushing's given lack of suppression with dex suppression testing. Salivary cortisol and 24 hour urine cortisol nondiagnostic due to low quantity. Can be worked up further as outpatient with endocrinologist.     Tay Ross D.O  [phone]

## 2024-05-04 NOTE — PROGRESS NOTE ADULT - SUBJECTIVE AND OBJECTIVE BOX
[facility]   [phone]  RENAL FOLLOW UP NOTE  --------------------------------------------------------------------------------  Chief Complaint:    24 hour events/subjective:    seen earlier   denies sob  does have c/o fatigue     PAST HISTORY  --------------------------------------------------------------------------------  No significant changes to PMH, PSH, FHx, SHx, unless otherwise noted    ALLERGIES & MEDICATIONS  --------------------------------------------------------------------------------  Allergies    contrast media (iodine-based) (Fever; Vomiting; Flushing; Hypotension; Rash; Stomach Upset)  Ativan (Rash; Urticaria)  penicillins (Hives (Mod to Severe); Anaphylaxis (Mod to Severe); Short breath (Mod to Severe); Angioedema (Mod to Severe))    Intolerances      Standing Inpatient Medications  ascorbic acid 500 milliGRAM(s) Oral daily  aspirin  chewable 81 milliGRAM(s) Oral daily  atorvastatin 80 milliGRAM(s) Oral at bedtime  buMETAnide Infusion 2 mG/Hr IV Continuous <Continuous>  calamine/zinc oxide Lotion 1 Application(s) Topical three times a day  chlorhexidine 2% Cloths 1 Application(s) Topical daily  chlorhexidine 4% Liquid 1 Application(s) Topical <User Schedule>  epoetin mendoza-epbx (RETACRIT) Injectable 83147 Unit(s) IV Push <User Schedule>  ferrous    sulfate 325 milliGRAM(s) Oral two times a day  heparin   Injectable 5000 Unit(s) SubCutaneous every 8 hours  insulin glargine Injectable (LANTUS) 32 Unit(s) SubCutaneous at bedtime  insulin lispro (ADMELOG) corrective regimen sliding scale   SubCutaneous Before meals and at bedtime  insulin lispro Injectable (ADMELOG) 8 Unit(s) SubCutaneous three times a day before meals  levothyroxine 75 MICROGram(s) Oral daily  midodrine 10 milliGRAM(s) Oral every 8 hours  multivitamin 1 Tablet(s) Oral daily  mupirocin 2% Ointment 1 Application(s) Topical three times a day  nystatin    Suspension 625261 Unit(s) Oral four times a day  petrolatum white Ointment 1 Application(s) Topical three times a day  senna 2 Tablet(s) Oral at bedtime  vasopressin Infusion 0.02 Unit(s)/Min IV Continuous <Continuous>    PRN Inpatient Medications  polyethylene glycol 3350 17 Gram(s) Oral two times a day PRN      REVIEW OF SYSTEMS  --------------------------------------------------------------------------------    Gen: denies  fevers/chills,  CVS: denies chest pain/palpitations  Resp: denies SOB/Cough  GI: Denies N/V/Abd pain  : Denies dysuria    VITALS/PHYSICAL EXAM  --------------------------------------------------------------------------------  T(C): 37.1 (05-04-24 @ 19:00), Max: 37.1 (05-04-24 @ 19:00)  HR: 84 (05-04-24 @ 19:15) (75 - 115)  BP: --  RR: 24 (05-04-24 @ 19:15) (16 - 49)  SpO2: 96% (05-04-24 @ 19:15) (90% - 99%)  Wt(kg): --        05-03-24 @ 07:01  -  05-04-24 @ 07:00  --------------------------------------------------------  IN: 1281 mL / OUT: 2385 mL / NET: -1104 mL    05-04-24 @ 07:01  -  05-04-24 @ 19:27  --------------------------------------------------------  IN: 984 mL / OUT: 1840 mL / NET: -856 mL      Physical Exam:  	    Gen:  on RA, facial erythema, improving   	Pulm: decrease bs  no rales or ronchi or wheezing  	CV: No JVD. RRR, S1S2; no rub II/VI M   	Abd: +BS, soft, nontender/nondistended, obese   	UE: Warm, no cyanosis  no clubbing,   +  edema  	LE: Warm, no cyanosis  no clubbing, 3+ softer  edema, B/L   	Neuro: alert and oriented               Vascular Access: +  non tunneled HD catheter    LABS/STUDIES  --------------------------------------------------------------------------------              7.9    9.37  >-----------<  193      [05-04-24 @ 00:45]              25.3     132  |  96  |  27  ----------------------------<  145      [05-04-24 @ 06:11]  4.0   |  22  |  2.36        Ca     8.2     [05-04-24 @ 06:11]      Mg     2.0     [05-04-24 @ 06:11]      Phos  4.7     [05-04-24 @ 06:11]    TPro  6.1  /  Alb  3.1  /  TBili  0.5  /  DBili  x   /  AST  16  /  ALT  11  /  AlkPhos  72  [05-04-24 @ 06:11]          Creatinine Trend:  SCr 2.36 [05-04 @ 06:11]  SCr 2.37 [05-04 @ 00:45]  SCr 2.26 [05-03 @ 17:06]  SCr 1.72 [05-03 @ 00:49]  SCr 1.48 [05-02 @ 18:53]      PTH -- (Ca 8.4)      [04-19-24 @ 17:54]   109  TSH 5.06      [04-14-24 @ 07:18]  Lipid: chol 74, TG 70, HDL 33, LDL --      [04-13-24 @ 07:05]

## 2024-05-04 NOTE — PROGRESS NOTE ADULT - SUBJECTIVE AND OBJECTIVE BOX
Patient is a 73y old  Female who presents with a chief complaint of Syncope (04 May 2024 19:26)    INTERVAL HISTORY:  - Remains on Bumex gtt at 2 for diuresis & Vaso .02 for renal perfusion    SUBJECTIVE  - Patient seen and evaluated at bedside.     MEDICATIONS:  MEDICATIONS  (STANDING):  ascorbic acid 500 milliGRAM(s) Oral daily  aspirin  chewable 81 milliGRAM(s) Oral daily  atorvastatin 80 milliGRAM(s) Oral at bedtime  buMETAnide Infusion 2 mG/Hr (10 mL/Hr) IV Continuous <Continuous>  calamine/zinc oxide Lotion 1 Application(s) Topical three times a day  chlorhexidine 2% Cloths 1 Application(s) Topical daily  chlorhexidine 4% Liquid 1 Application(s) Topical <User Schedule>  epoetin mendoza-epbx (RETACRIT) Injectable 12842 Unit(s) IV Push <User Schedule>  ferrous    sulfate 325 milliGRAM(s) Oral two times a day  heparin   Injectable 5000 Unit(s) SubCutaneous every 8 hours  insulin glargine Injectable (LANTUS) 32 Unit(s) SubCutaneous at bedtime  insulin lispro (ADMELOG) corrective regimen sliding scale   SubCutaneous Before meals and at bedtime  insulin lispro Injectable (ADMELOG) 8 Unit(s) SubCutaneous three times a day before meals  levothyroxine 75 MICROGram(s) Oral daily  midodrine 10 milliGRAM(s) Oral every 8 hours  multivitamin 1 Tablet(s) Oral daily  mupirocin 2% Ointment 1 Application(s) Topical three times a day  nystatin    Suspension 734980 Unit(s) Oral four times a day  petrolatum white Ointment 1 Application(s) Topical three times a day  senna 2 Tablet(s) Oral at bedtime  vasopressin Infusion 0.02 Unit(s)/Min (3 mL/Hr) IV Continuous <Continuous>    MEDICATIONS  (PRN):  polyethylene glycol 3350 17 Gram(s) Oral two times a day PRN Constipation    OBJECTIVE:  ICU Vital Signs Last 24 Hrs  T(C): 37.1 (04 May 2024 19:00), Max: 37.1 (04 May 2024 19:00)  T(F): 98.7 (04 May 2024 19:00), Max: 98.7 (04 May 2024 19:00)  HR: 84 (04 May 2024 19:15) (75 - 115)  ABP: 107/36 (04 May 2024 19:15) (71/40 - 143/56)  ABP(mean): 60 (04 May 2024 19:15) (51 - 88)  RR: 24 (04 May 2024 19:15) (16 - 49)  SpO2: 96% (04 May 2024 19:15) (90% - 99%)    O2 Parameters below as of 04 May 2024 19:00  Patient On (Oxygen Delivery Method): room air    CAPILLARY BLOOD GLUCOSE  POCT Blood Glucose.: 109 mg/dL (04 May 2024 16:19)  POCT Blood Glucose.: 122 mg/dL (04 May 2024 12:08)  POCT Blood Glucose.: 154 mg/dL (04 May 2024 08:18)  POCT Blood Glucose.: 210 mg/dL (03 May 2024 21:43)    CAPILLARY BLOOD GLUCOSE  POCT Blood Glucose.: 109 mg/dL (04 May 2024 16:19)    I&O's Summary    03 May 2024 07:01  -  04 May 2024 07:00  --------------------------------------------------------  IN: 1281 mL / OUT: 2385 mL / NET: -1104 mL    04 May 2024 07:01  -  04 May 2024 19:46  --------------------------------------------------------  IN: 997 mL / OUT: 1840 mL / NET: -843 mL    Daily Weight in k.3 (04 May 2024 04:00)    LABS:  ABG - ( 04 May 2024 00:40 )  pH, Arterial: 7.37  pH, Blood: x     /  pCO2: 43    /  pO2: 109   / HCO3: 25    / Base Excess: -0.4  /  SaO2: 98.8                          7.9    9.37  )-----------( 193      ( 04 May 2024 00:45 )             25.3     05-04  132<L>  |  96  |  27<H>  ----------------------------<  145<H>  4.0   |  22  |  2.36<H>    Ca    8.2<L>      04 May 2024 06:11  Phos  4.7     05-  Mg     2.0     05-04    TPro  6.1  /  Alb  3.1<L>  /  TBili  0.5  /  DBili  x   /  AST  16  /  ALT  11  /  AlkPhos  72  05-04    LIVER FUNCTIONS - ( 04 May 2024 06:11 )  Alb: 3.1 g/dL / Pro: 6.1 g/dL / ALK PHOS: 72 U/L / ALT: 11 U/L / AST: 16 U/L / GGT: x           Urinalysis Basic - ( 04 May 2024 06:11 )    Color: x / Appearance: x / SG: x / pH: x  Gluc: 145 mg/dL / Ketone: x  / Bili: x / Urobili: x   Blood: x / Protein: x / Nitrite: x   Leuk Esterase: x / RBC: x / WBC x   Sq Epi: x / Non Sq Epi: x / Bacteria: x      ====================ASSESSMENT ==============  73F PMH: HFrEF (EF 30%), AS, LBBB, HTN, DM1, CKD, hypothyroidism, chronic lymphedema, LELIA (3L home O2) p/w for syncope 2/2 severe AS, s/p TAVR . Course complicated by contrast induced allergic reaction (had CTA for TAVR eval) and contrast related renal failure (acute on chronic) requiring HD. TAVR c/b VT and vfib requiring shock and flash pulmonary edema causing AHRF requiring intubation. Currently extubated,  on CVVPHD for fluid removal, and on pressor support for vasoplegia and hypovolemia due to CRRT.    NEURO:  - Extubated   - Currently AOx4  - OOBTC, c/w PT as tolerated  - PT recommends HONEY for eventual dispo    PULM  # AHRF 2/2 flash pulmonary edema  # LELIA (on 3L home oxygen)  - Extubated   -  CXR - no consolidation or pleural effusion  - stable on RA now    CV  #Shock state  -  vasoplegia from sedation vs losing fluid from CVVHD vs distributive septic shock  - on vasopressin 0.02, maintain for renal perfusion as UO significantly drops when Vaso turned off despite holding BP    #VT/vfib s/p shock  #2:1 AV block  #Severe AS s/p TAVR  - Known LBBB with first degree AV block on telemetry  - s/p Micra , no longer requiring TVP (eventual candidate for CRT per EP)  - changes noted on telemetry requiring EP f/u and possible interrogation of device  - c/w ASA, atorvastatin     # HFrEF  - EF 25%  - Holding GDMT while on pressors    /RENAL  #ESRD  - i/s/o ATN and contrast nephropathy  - c/w pressor support   - Strict I/Os  - Trend BMP, lytes  - Avoid nephrotoxins  - Nephro following- pt still remains fluid overloaded, start IHD likely tomorrow  - start midodrine support 10 mg q8h  - c/w bumex drip 2mg/hr and monitor for UOP  - monitor daily for initiation of HD eventually    GI  - tolerating  diet    ENDO  # DM1  - on  basal/bolus lantus 32 QHS, admelog dec to 8u TIDAC   - monitor POCT FS, goal 140-180    # Hypothyroidism  - c/w home levothyroxine 75mcg    #Cushing  # L adrenal nodule  -dexamethasone suppression test w/ am cortisol x2 completed, pt is pos for Cushing's disease, f/u outpt, recheck once critical illness resolves  -f/u [place] salivary cortisol  - pending CT A/P adrenal protocol    SKIN  # Acute generalized exanthematous pustulosis   - c/w calamine,  Vaseline    # Psoriasis   # Lymphedema   # R. Diabetic foot ulcer  - Elevate legs  - Compression stockings, ACE wrapping  - Podiatry following- foot wounds management, decubiti precautions f/u outpt, check for iodine gel allergy    ID  #leukocytosis  - afebrile, bcx neg  - Monitor WBC and for fevers, and signs of infection    # Oral Candidiasis  - c/w nystatin     Heme  #Anemia, worsening  -AARTI vs AOCD vs anemia 2/2 blood loss vs myelosuppression iso critical illness  -trend H&H, transfuse PRN    #thrombocytopenia  -  myelosuppression iso critical illness  - no active signs of bleeding, monitor plts, transfuse PRN    Dispo: Maintain in ICU.    Patient requires continuous monitoring with bedside rhythm monitoring, arterial line, pulse oximetry, ventilator monitoring and intermittent blood gas analysis.  Care plan discussed with ICU care team.  I have spent 35 minutes providing critical care, in addition to initial critical time provided by CICU attending Dr. Sandoval, re-evaluated multiple times during the day.    Georgia Doshi PA-C

## 2024-05-04 NOTE — PROGRESS NOTE ADULT - ATTENDING COMMENTS
Initially admitted with syncope in the setting of severe AS s/p TAVR   Hospital course has been complicated by VT/VF cardiac arrest, DUNCAN requiring CVVH, heart block requiring PPM and contrast-induced skin sloughing  A+Ox3  Shock requiring Vasopressin in the setting of CVVH, likely due to intravascular depletion   Wean Vasopressin as able for MAP 60 given wide pulse pressure  TTE with severely reduced LVEF  O2 sats mid 90s on room air  DASH/diabetic diet  DUNCAN, transitioned off CVVH, now being challenged with diuretics prior to any initiation of iHD - continue Bumex drip  H/H low but acceptable on HSQ for DVT prophylaxis   Afebrile, no antibiotics  Sugars controlled on Lantus  Endocrine if following for adrenal nodule and concern for Cushings - f/u recs  Marily 4/24   OOB

## 2024-05-04 NOTE — PROGRESS NOTE ADULT - SUBJECTIVE AND OBJECTIVE BOX
MR#[id number]  PATIENT NAME:JOHNATHAN LOPEZ    DATE OF SERVICE: 05-04-24 @ 07:32  Patient was seen and examined by Cuco Tubbs MD on    05-04-24 @ 07:32 .  Interim events noted.Consultant notes ,Labs,Telemetry reviewed by me         Covering for [facility] -Howard Sterling, Carlos Luong, Herman Alston  Office Number: [phone]         HOSPITAL COURSE: HPI:  73F w HFrEF (EF 30%), mod AS, known LBBB, HTN, IDDM1, CKD, hypothyroidism, chronic lymphedema, suspected OHS/LELIA on 3L NC home O2 p/w 1 episode of syncope. Recent admission at \[facility]\"" (3/29-4/5) for ADHF and DUNCAN s/p diuresis, discharged with new home O2 3L NC suspecting OHS/LELIA pending outpatient w/u. Since discharge a week ago, she has been staying at home with   Pt had sob walking to car. Once in car, she was still breathing heavily. Partner noticed pt was not responding to verbal stimuli for 10-15sec and witnessed R arm jerking. Pt gained consciousness quickly without postictal symptoms. Pt went to Cardiologist Dr. Nathan who recommended pt to come to ED. No fever, cp, abd pain, n/v. Leg swelling is improved from prior. Pt on torsemide 40mg which she has been taking. Pt was discharged on 3LNC which she is currently on. Patient reports she did not take Farxiga that she was discharged on as she was concerned about side effects.     In ED, patient was found afebrile, hemodynamically stable, breathing well on 3L NC. Initial labs notable for mild hyponatremia, DUNCAN on CKD, elevated proBNP and elevated pCO2 both improved from prior. (12 Apr 2024 16:31)      INTERIM EVENTS:Patient seen at bedside ,interim events noted.  Pt. went for TAVR on 2/24 which was c/b VT/VF requiring shock and flash pulmonary edema causing AHRF requiring intubation. Extubated 4/26  Currently undergoing HD for hyperkalemia and fluid overload.   Currently on pressor support for possible vasoplegic shock, requiring management in CICU.  - s/p Micra 4/25  - Extubated 4/26  -5/4-Remains on vaso .01 & Bumex gtt at 2    PMH -reviewed admission note, no change since admission  HEART FAILURE: Acute[x ]Chronic[x ] Systolic[ x] Diastolic[ ] Combined Systolic and Diastolic[ ]  CAD[ ] CABG[ ] PCI[ ]  DEVICES[ ] PPM[ ] ICD[ ] ILR[ ]  ATRIAL FIBRILLATION[x ] Paroxysmal[ ] Permanent[ ] CHADS2-[  ]  DUNCAN[ ] CKD1[ ] CKD2[ ] CKD3[ ] CKD4[ ] ESRD[ ]  COPD[ ] HTN[ ]   DM[ ] Type1[ ] Type 2[ ]   CVA[ ] Paresis[ ]    AMBULATION: Assisted[ ] Cane/walker[x ] Independent[ ]    MEDICATIONS  (STANDING):  ascorbic acid 500 milliGRAM(s) Oral daily  aspirin  chewable 81 milliGRAM(s) Oral daily  atorvastatin 80 milliGRAM(s) Oral at bedtime  buMETAnide Infusion 2 mG/Hr (10 mL/Hr) IV Continuous <Continuous>  calamine/zinc oxide Lotion 1 Application(s) Topical three times a day  chlorhexidine 2% Cloths 1 Application(s) Topical daily  chlorhexidine 4% Liquid 1 Application(s) Topical <User Schedule>  epoetin mendoza-epbx (RETACRIT) Injectable 67160 Unit(s) IV Push <User Schedule>  ferrous    sulfate 325 milliGRAM(s) Oral two times a day  heparin   Injectable 5000 Unit(s) SubCutaneous every 8 hours  insulin glargine Injectable (LANTUS) 32 Unit(s) SubCutaneous at bedtime  insulin lispro (ADMELOG) corrective regimen sliding scale   SubCutaneous Before meals and at bedtime  insulin lispro Injectable (ADMELOG) 8 Unit(s) SubCutaneous three times a day before meals  levothyroxine 75 MICROGram(s) Oral daily  midodrine 10 milliGRAM(s) Oral every 8 hours  multivitamin 1 Tablet(s) Oral daily  mupirocin 2% Ointment 1 Application(s) Topical three times a day  nystatin    Suspension 367751 Unit(s) Oral four times a day  petrolatum white Ointment 1 Application(s) Topical three times a day  senna 2 Tablet(s) Oral at bedtime  vasopressin Infusion 0.02 Unit(s)/Min (3 mL/Hr) IV Continuous <Continuous>    MEDICATIONS  (PRN):  polyethylene glycol 3350 17 Gram(s) Oral two times a day PRN Constipation            REVIEW OF SYSTEMS:  Constitutional: [ ] fever, [ ]weight loss,  [x ]fatigue [ ]weight gain  Eyes: [ ] visual changes  Respiratory: [x ]shortness of breath;  [ ] cough, [ ]wheezing, [ ]chills, [ ]hemoptysis  Cardiovascular: [ ] chest pain, [ ]palpitations, [ ]dizziness,  [xx ]leg swelling[ ]orthopnea[ ]PND  Gastrointestinal: [ ] abdominal pain, [ ]nausea, [ ]vomiting,  [ ]diarrhea [ ]Constipation [ ]Melena  Genitourinary: [ ] dysuria, [ ] hematuria [ ][place] Garza  Neurologic: [ ] headaches [ ] tremors[ ]weakness [ ]Paralysis Right[ ] Left[ ]  Skin: [ ] itching, [ ]burning, [ ] rashes  Endocrine: [ ] heat or cold intolerance  Musculoskeletal: [ ] joint pain or swelling; [ ] muscle, back, or extremity pain  Psychiatric: [ ] depression, [ ]anxiety, [ ]mood swings, or [ ]difficulty sleeping  Hematologic: [ ] easy bruising, [ ] bleeding gums    [ ] All remaining systems negative except as per above.   [ ]Unable to obtain.  [x] No change in ROS since admission      Vital Signs Last 24 Hrs  T(C): 36.7 (04 May 2024 03:00), Max: 36.8 (03 May 2024 23:00)  T(F): 98 (04 May 2024 03:00), Max: 98.3 (03 May 2024 23:00)  HR: 80 (04 May 2024 06:30) (77 - 112)  BP: --106/48  RR: 17 (04 May 2024 06:30) (16 - 49)  SpO2: 95% (04 May 2024 06:30) (90% - 99%)    Parameters below as of 04 May 2024 06:00  Patient On (Oxygen Delivery Method): room air      I&O's Summary    03 May 2024 07:01  -  04 May 2024 07:00  --------------------------------------------------------  IN: 1269.5 mL / OUT: 2235 mL / NET: -965.5 mL        PHYSICAL EXAM:  General: No acute distress BMI-32  HEENT: EOMI, PERRL  Neck: Supple, [ ] JVD  Lungs: Equal air entry bilaterally; [ ] rales [ ] wheezing [ ] rhonchi  Heart: Regular rate and rhythm; [x ] murmur   2/6 [ x] systolic [ ] diastolic [ ] radiation[ ] rubs [ ]  gallops  Abdomen: Nontender, bowel sounds present  Extremities: No clubbing, cyanosis, [ xx] lymphedema [ ]Pulses  equal and intact  Nervous system:  Alert & Oriented X3, no focal deficits  Psychiatric: Normal affect  Skin: No rashes or lesions    LABS:  05-04    132<L>  |  96  |  27<H>  ----------------------------<  145<H>  4.0   |  22  |  2.36<H>    Ca    8.2<L>      04 May 2024 06:11  Phos  4.7     05-04  Mg     2.0     05-04    TPro  6.1  /  Alb  3.1<L>  /  TBili  0.5  /  DBili  x   /  AST  16  /  ALT  11  /  AlkPhos  72  05-04    Creatinine Trend: 2.36<--, 2.37<--, 2.26<--, 1.72<--, 1.48<--, 1.21<--                        7.9    9.37  )-----------( 193      ( 04 May 2024 00:45 )             25.3        TTE W or WO Ultrasound Enhancing Agent (04.25.24 @ 09:27) >  CONCLUSIONS:      1. Left ventricular cavity is moderately dilated. Left ventricular systolic function is severely decreased with an ejection fraction of 25 % by Nunes's method of disks. Global left ventricular hypokinesis.   2. There is no evidence of a left ventricular thrombus.   3. There is moderate (grade 2) left ventricular diastolic dysfunction, withelevated filling pressure.   4. There is calcification of the mitral valve annulus.   5. A Benitez Bakari (TAVR) valve replacement is present in the aortic position The prosthetic valve is well seated. No paravalvular regurgitation.   6. Moderately enlarged right ventricular cavity size, with normal wall thickness, and normal systolic function.   7. The right atrium is moderately dilated.   8. Left pleural effusion noted.   9. No pericardial effusion seen.  10. Compared to the transthoracic echocardiogram performed on 4/24/2024, there have been no significant interval changes.      12 Lead ECG (05.03.24 @ 05:42) >   SINUS RHYTHM WITH 1ST DEGREE A-V BLOCK  LEFT AXIS DEVIATION  LEFT BUNDLE BRANCH BLOCK  ABNORMAL ECG      Xray Chest 1 View- PORTABLE-Urgent (Xray Chest 1 View- PORTABLE-Urgent .) (04.27.24 @ 13:54) >  Shiley catheter tip is in the SVC.  TAVR and Micra device noted.  No focal consolidation.  No pleural effusion or pneumothorax.  There is limited evaluation of the heart size and mediastinum on portable  technique.  No acute abnormality within visible osseous structures.      IMPRESSION:  Shiley catheter tip is in the SVC.

## 2024-05-04 NOTE — PROGRESS NOTE ADULT - ASSESSMENT
Assessment:  73F PMH: HFrEF (EF 30%), AS, LBBB, HTN, DM1, CKD, hypothyroidism, chronic lymphedema, LELIA (3L home O2) p/w for syncope 2/2 severe AS, s/p TAVR 4/24. Course complicated by contrast induced allergic reaction (had CTA for TAVR eval) and contrast related renal failure (acute on chronic) requiring HD. TAVR c/b VT and vfib requiring shock and flash pulmonary edema causing AHRF requiring intubation. Currently extubated,  on CVVPHD for fluid removal, and on pressor support for vasoplegia and hypovolemia due to CRRT.    NEURO:  - Extubated 4/26  - Currently AOx4  - OOBTC, c/w PT as tolerated  - Pt recommends HONEY    PULM  # AHRF  2/2 flash pulmonary edema  # LELIA (on 3L home oxygen)  - Extubated 4/26  - 4/27 CXR - no consolidation or pleural effusion  - on RA now    CV  #Shock state  -  vasoplegia from sedation vs losing fluid from CVVHD vs distributive septic shock  - on vasopressin 0.05, wean off as tolerated  - f/u US for IVC collapsibility    #VT/vfib s/p shock  #2:1 AV block  #Severe AS s/p TAVR  - Known LBBB with first degree AV block on telemetry  - s/p Micra 4/25, no longer requiring TVP (eventual candidate for CRT per EP)  - changes noted on telemetry requiring EP f/u and possible interrogation of device  - c/w ASA, atorvastatin     # HFrEF  - EF 25%  - Holding GDMT while on pressors    /RENAL  #ESRD  - from ATN and contrast nephropathy  - c/w pressor support   - Strict I/Os  - Trend BMP, lytes  - Avoid nephrotoxins  - Nephro following- pt still remains fluid overloaded, start IHD likely tomorrow  - start midodrine support 10 mg q8h  - dc CRRT for 24 hr rest, monitor for start of IHD possibly tomorrow  - bumex given x2 w/improving UOP yesterday  - start bumex drip 2mg/hr and monitor for UOP    GI  - tolerating  diet    ENDO  # DM1  # DKA    - on  basal/bolus lantus 32 QHS, admelog dec to 8u TIDAC   - monitor POCT FS, goal 140-180    # Hypothyroidism  - c/w home levothyroxine 75mcg    #Cushing  # L adrenal nodule  -dexamethasone suppression test w/ am cortisol x2 completed, pt is pos for Cushing's disease, f/u outpt, recheck once critical illness resolves  -f/u [place] salivary cortisol      SKIN  # Acute generalized exanthematous pustulosis   - c/w calamine,  Vaseline    # Psoriasis   # Lymphedema   # R. Diabetic foot ulcer  - Elevate legs  - Compression stockings, ACE wrapping  - Podiatry following- foot wounds management, decubiti precautions f/u outpt, check for iodine gel allergy    ID  #leukocytosis  - afebrile, bcx neg  - Monitor WBC and for fevers, and signs of infection    # Oral Candidiasis  - c/w nystatin     Heme  #Anemia, worsening  -AARTI vs AOCD vs anemia 2/2 blood loss vs myelosuppression iso critical illness  -trend H&H, transfuse PRN    #thrombocytopenia  -  myelosuppression iso critical illness  - no active signs of bleeding, monitor plts, transfuse PRN      Mobilize out of bed once off CRRT   Patient requires continuous monitoring with bedside rhythm monitoring, pulse ox monitoring, and intermittent blood gas analysis. Care plan discussed with ICU care team. Patient remained critical and at risk for life threatening decompensation.  Patient seen, examined and plan discussed with CCU team during rounds.    Assessment:  73F PMH: HFrEF (EF 30%), AS, LBBB, HTN, DM1, CKD, hypothyroidism, chronic lymphedema, LELAI (3L home O2) p/w for syncope 2/2 severe AS, s/p TAVR 4/24. Course complicated by contrast induced allergic reaction (had CTA for TAVR eval) and contrast related renal failure (acute on chronic) requiring HD. TAVR c/b VT and vfib requiring shock and flash pulmonary edema causing AHRF requiring intubation. Currently extubated,  on CVVPHD for fluid removal, and on pressor support for vasoplegia and hypovolemia due to CRRT.    NEURO:  - Extubated 4/26  - Currently AOx4  - OOBTC, c/w PT as tolerated  - Pt recommends HONEY    PULM  # AHRF  2/2 flash pulmonary edema  # LELIA (on 3L home oxygen)  - Extubated 4/26  - 4/27 CXR - no consolidation or pleural effusion  - on RA now    CV  #Shock state  -  vasoplegia from sedation vs losing fluid from CVVHD vs distributive septic shock  - on vasopressin 0.01, wean off as tolerated  - f/u US for IVC collapsibility    #VT/vfib s/p shock  #2:1 AV block  #Severe AS s/p TAVR  - Known LBBB with first degree AV block on telemetry  - s/p Micra 4/25, no longer requiring TVP (eventual candidate for CRT per EP)  - changes noted on telemetry requiring EP f/u and possible interrogation of device  - c/w ASA, atorvastatin     # HFrEF  - EF 25%  - Holding GDMT while on pressors    /RENAL  #ESRD  - from ATN and contrast nephropathy  - c/w pressor support   - Strict I/Os  - Trend BMP, lytes  - Avoid nephrotoxins  - Nephro following- pt still remains fluid overloaded, start IHD likely tomorrow  - c/w midodrine support 10 mg q8h  - bumex given x2 w/improving UOP yesterday  - c/wt bumex drip 2mg/hr and monitor for UOP, currently neg net 1100 cc in last 24 hrs    GI  - tolerating  diet    ENDO  # DM1  # DKA    - on  basal/bolus lantus 32 QHS, admelog dec to 8u TIDAC   - monitor POCT FS, goal 140-180    # Hypothyroidism  - c/w home levothyroxine 75mcg    #Cushing  # L adrenal nodule  -dexamethasone suppression test w/ am cortisol x2 completed, pt is pos for Cushing's disease, f/u outpt, recheck once critical illness resolves  -f/u [place] salivary cortisol      SKIN  # Acute generalized exanthematous pustulosis   - c/w calamine,  Vaseline    # Psoriasis   # Lymphedema   # R. Diabetic foot ulcer  - Elevate legs  - Compression stockings, ACE wrapping  - Podiatry following- foot wounds management, decubiti precautions f/u outpt, check for iodine gel allergy    ID  #leukocytosis  - afebrile, bcx neg  - Monitor WBC and for fevers, and signs of infection    # Oral Candidiasis  - c/w nystatin     Heme  #Anemia, worsening  -AARTI vs AOCD vs anemia 2/2 blood loss vs myelosuppression iso critical illness  -trend H&H, transfuse PRN    #thrombocytopenia  -  myelosuppression iso critical illness  - no active signs of bleeding, monitor plts, transfuse PRN      Mobilize out of bed once off CRRT   Patient requires continuous monitoring with bedside rhythm monitoring, pulse ox monitoring, and intermittent blood gas analysis. Care plan discussed with ICU care team. Patient remained critical and at risk for life threatening decompensation.  Patient seen, examined and plan discussed with CCU team during rounds.

## 2024-05-04 NOTE — PROGRESS NOTE ADULT - SUBJECTIVE AND OBJECTIVE BOX
PROGRESS NOTE:   Authored by Dr. Jane Sandy  Patient is a 73y old  Female who presents with a chief complaint of Syncope (04 May 2024 08:44)        OVERNIGHT EVENTS: No acute overnight events.    SUBJECTIVE: Patient was seen and examined at bedside this morning.   Denies any nausea/vomiting/diarrhea, headache, shortness of breath, abdominal pain or chest pain/palpitations.   Patient responding appropriately to questions and able to make needs known.   Vital signs/imaging/labs/telemetry events reviewed.   No acute complaints or concerns. Pt is feeling well. Tolerating PO.  Stating she feels fine.     All other ROS neg except as above       MEDICATIONS  (STANDING):  ascorbic acid 500 milliGRAM(s) Oral daily  aspirin  chewable 81 milliGRAM(s) Oral daily  atorvastatin 80 milliGRAM(s) Oral at bedtime  buMETAnide Infusion 2 mG/Hr (10 mL/Hr) IV Continuous <Continuous>  calamine/zinc oxide Lotion 1 Application(s) Topical three times a day  chlorhexidine 2% Cloths 1 Application(s) Topical daily  chlorhexidine 4% Liquid 1 Application(s) Topical <User Schedule>  epoetin mendoza-epbx (RETACRIT) Injectable 26263 Unit(s) IV Push <User Schedule>  ferrous    sulfate 325 milliGRAM(s) Oral two times a day  heparin   Injectable 5000 Unit(s) SubCutaneous every 8 hours  insulin glargine Injectable (LANTUS) 32 Unit(s) SubCutaneous at bedtime  insulin lispro (ADMELOG) corrective regimen sliding scale   SubCutaneous Before meals and at bedtime  insulin lispro Injectable (ADMELOG) 8 Unit(s) SubCutaneous three times a day before meals  levothyroxine 75 MICROGram(s) Oral daily  midodrine 10 milliGRAM(s) Oral every 8 hours  multivitamin 1 Tablet(s) Oral daily  mupirocin 2% Ointment 1 Application(s) Topical three times a day  nystatin    Suspension 858596 Unit(s) Oral four times a day  petrolatum white Ointment 1 Application(s) Topical three times a day  senna 2 Tablet(s) Oral at bedtime  vasopressin Infusion 0.02 Unit(s)/Min (3 mL/Hr) IV Continuous <Continuous>    MEDICATIONS  (PRN):  polyethylene glycol 3350 17 Gram(s) Oral two times a day PRN Constipation      CAPILLARY BLOOD GLUCOSE      POCT Blood Glucose.: 122 mg/dL (04 May 2024 12:08)  POCT Blood Glucose.: 154 mg/dL (04 May 2024 08:18)  POCT Blood Glucose.: 210 mg/dL (03 May 2024 21:43)  POCT Blood Glucose.: 199 mg/dL (03 May 2024 16:20)    I&O's Summary    03 May 2024 07:01  -  04 May 2024 07:00  --------------------------------------------------------  IN: 1281 mL / OUT: 2385 mL / NET: -1104 mL    04 May 2024 07:01  -  04 May 2024 13:11  --------------------------------------------------------  IN: 529 mL / OUT: 860 mL / NET: -331 mL        PHYSICAL EXAM:  Vital Signs Last 24 Hrs  T(C): 36.9 (04 May 2024 08:00), Max: 36.9 (04 May 2024 08:00)  T(F): 98.4 (04 May 2024 08:00), Max: 98.4 (04 May 2024 08:00)  HR: 109 (04 May 2024 13:00) (75 - 112)  BP: --  BP(mean): --  RR: 23 (04 May 2024 13:00) (16 - 49)  SpO2: 96% (04 May 2024 13:00) (90% - 99%)    Parameters below as of 04 May 2024 13:00  Patient On (Oxygen Delivery Method): room air        PHYSICAL EXAM:     GENERAL: NAD  HEAD:  Atraumatic, Normocephalic  EYES: EOMI, conjunctiva and sclera clear, no nystagmus noted  ENT: Moist mucous membranes  CHEST/LUNG: normal work of breathing, Clear to auscultation bilaterally; No rales, rhonchi, wheezing, or rubs. Unlabored respirations  HEART:  Regular rate and rhythm; No murmurs, rubs, or gallops, normal S1/S2  ABDOMEN: normal bowel sounds; Soft, nontender, nondistended, no organomegaly   EXTREMITIES:  extensive chronic edema in b/l LE, dec Peripheral Pulses, No clubbing, cyanosis  MSK: No gross deformities noted, ROM wnl   Neurological:  A&Ox3, no focal deficits   SKIN: dry peeling flakey pale,  scattered psoriatic plaques throughout extremities and torso, no blistering  or  drainage  BL LE soft plaques easily lifted w/ mechanical debridement , w/ open skin & blistering and scant drainage  Rt plantar foot w/ dark blister around callous  No odor, erythema, increased warmth, tenderness, induration, fluctuance, nor crepitus  PSYCH: Normal mood, affect            LABS:                        7.9    9.37  )-----------( 193      ( 04 May 2024 00:45 )             25.3     05-04    132<L>  |  96  |  27<H>  ----------------------------<  145<H>  4.0   |  22  |  2.36<H>    Ca    8.2<L>      04 May 2024 06:11  Phos  4.7     05-04  Mg     2.0     05-04    TPro  6.1  /  Alb  3.1<L>  /  TBili  0.5  /  DBili  x   /  AST  16  /  ALT  11  /  AlkPhos  72  05-04              Tele Reviewed:    RADIOLOGY & ADDITIONAL TESTS:  Results Reviewed: yes  Imaging Personally Reviewed: yes  Electrocardiogram Personally Reviewed: yes

## 2024-05-04 NOTE — PROGRESS NOTE ADULT - ASSESSMENT
73F w HFrEF (EF 30%), mod AS, known LBBB, HTN, IDDM1, CKD, hypothyroidism, chronic lymphedema, suspected OHS/LELIA on 3L NC home O2 p/w 1 episode of syncope. Recent admission at [facility] (3/29-4/5) for ADHF and DUNCAN s/p diuresis, discharged with new home O2 3L NC suspecting OHS/LELIA pending outpatient w/u. Since discharge a week ago, she has been staying at home with   Pt had sob walking to car. Once in car, she was still breathing heavily. Partner noticed pt was not responding to verbal stimuli for 10-15sec and witnessed R arm jerking. Pt gained consciousness quickly without postictal symptoms. Pt went to Cardiologist Dr. Nathan who recommended pt to come to ED. No fever, cp, abd pain, n/v. Leg swelling is improved from prior. Pt on torsemide 40mg which she has been taking. Pt was discharged on 3LNC which she is currently on. Patient reports she did not take Farxiga that she was discharged on as she was concerned about side effects.     In ED, patient was found afebrile, hemodynamically stable, breathing well on 3L NC. Initial labs notable for mild hyponatremia, DUNCAN on CKD, elevated proBNP and elevated pCO2 both improved from prior.  pt also noticed with abn creatinine      1- CKD IV  with DUNCAN   2- CHF   3- lymphedema   4- severe AS  s/p tavr 4/24  5- syncope   6- hyperkalemia  7- hypotension         suspect ATN from hypotension along with contrast nephropathy   creatinine worsening requiring renal replacement therapy, HD initiated 4/18  fluid status improving   dc'd CRRT   has + uo is non oliguric  cont  bumex drip 2mg hr   strict I/O  derm following for rash  pressor support  with vasopressin   retacrit 41411 U Tiw for anemia   d.w CCU team

## 2024-05-04 NOTE — PROGRESS NOTE ADULT - TIME BILLING
- Review of records, telemetry, vital signs and daily labs.   - General and cardiovascular physical examination.  - Generation of cardiovascular treatment plan.  - Coordination of care.      Patient was seen and examined by me on  05/04/2024,interim events noted,labs and radiology studies reviewed.  Cuco Tubbs MD,FACC.  [address] [place], [place] [id number].  [phone]

## 2024-05-05 NOTE — PROGRESS NOTE ADULT - SUBJECTIVE AND OBJECTIVE BOX
PATIENT:  EVELYN LOPEZ  [id number]    CHIEF COMPLAINT:  Patient is a 73y old  Female who presents with a chief complaint of Syncope (05 May 2024 07:45)      INTERVAL HISTORYOVERNIGHT EVENTS:      REVIEW OF SYSTEMS:    Constitutional:     [ ] negative [ ] fevers [ ] chills [ ] weight loss [ ] weight gain  HEENT:                  [ ] negative [ ] dry eyes [ ] eye irritation [ ] postnasal drip [ ] nasal congestion  CV:                         [ ] negative  [ ] chest pain [ ] orthopnea [ ] palpitations [ ] murmur  Resp:                     [ ] negative [ ] cough [ ] shortness of breath [ ] dyspnea [ ] wheezing [ ] sputum [ ] hemoptysis  GI:                          [ ] negative [ ] nausea [ ] vomiting [ ] diarrhea [ ] constipation [ ] abd pain [ ] dysphagia   :                        [ ] negative [ ] dysuria [ ] nocturia [ ] hematuria [ ] increased urinary frequency  Musculoskeletal: [ ] negative [ ] back pain [ ] myalgias [ ] arthralgias [ ] fracture  Skin:                       [ ] negative [ ] rash [ ] itch  Neurological:        [ ] negative [ ] headache [ ] dizziness [ ] syncope [ ] weakness [ ] numbness  Psychiatric:           [ ] negative [ ] anxiety [ ] depression  Endocrine:            [ ] negative [ ] diabetes [ ] thyroid problem  Heme/Lymph:      [ ] negative [ ] anemia [ ] bleeding problem  Allergic/Immune: [ ] negative [ ] itchy eyes [ ] nasal discharge [ ] hives [ ] angioedema    [ ] All other systems negative  [ ] Unable to assess ROS because ________.    MEDICATIONS:  MEDICATIONS  (STANDING):  ascorbic acid 500 milliGRAM(s) Oral daily  aspirin  chewable 81 milliGRAM(s) Oral daily  atorvastatin 80 milliGRAM(s) Oral at bedtime  buMETAnide Infusion 2 mG/Hr (10 mL/Hr) IV Continuous <Continuous>  calamine/zinc oxide Lotion 1 Application(s) Topical three times a day  chlorhexidine 2% Cloths 1 Application(s) Topical daily  chlorhexidine 4% Liquid 1 Application(s) Topical <User Schedule>  epoetin mendoza-epbx (RETACRIT) Injectable 35543 Unit(s) IV Push <User Schedule>  ferrous    sulfate 325 milliGRAM(s) Oral two times a day  heparin   Injectable 5000 Unit(s) SubCutaneous every 8 hours  insulin glargine Injectable (LANTUS) 32 Unit(s) SubCutaneous at bedtime  insulin lispro (ADMELOG) corrective regimen sliding scale   SubCutaneous Before meals and at bedtime  insulin lispro Injectable (ADMELOG) 8 Unit(s) SubCutaneous three times a day before meals  levothyroxine 75 MICROGram(s) Oral daily  midodrine 20 milliGRAM(s) Oral every 8 hours  multivitamin 1 Tablet(s) Oral daily  mupirocin 2% Ointment 1 Application(s) Topical three times a day  nystatin    Suspension 671933 Unit(s) Oral four times a day  petrolatum white Ointment 1 Application(s) Topical three times a day  senna 2 Tablet(s) Oral at bedtime  vasopressin Infusion 0.02 Unit(s)/Min (3 mL/Hr) IV Continuous <Continuous>    MEDICATIONS  (PRN):  polyethylene glycol 3350 17 Gram(s) Oral two times a day PRN Constipation      ALLERGIES:  Allergies    contrast media (iodine-based) (Fever; Vomiting; Flushing; Hypotension; Rash; Stomach Upset)  Ativan (Rash; Urticaria)  penicillins (Hives (Mod to Severe); Anaphylaxis (Mod to Severe); Short breath (Mod to Severe); Angioedema (Mod to Severe))    Intolerances        OBJECTIVE:  ICU Vital Signs Last 24 Hrs  T(C): 36.4 (05 May 2024 03:00), Max: 37.1 (04 May 2024 19:00)  T(F): 97.5 (05 May 2024 03:00), Max: 98.7 (04 May 2024 19:00)  HR: 73 (05 May 2024 07:30) (73 - 115)  BP: --  BP(mean): --  ABP: 120/38 (05 May 2024 07:30) (70/32 - 143/56)  ABP(mean): 65 (05 May 2024 07:30) (40 - 87)  RR: 23 (05 May 2024 07:30) (12 - 63)  SpO2: 95% (05 May 2024 07:30) (90% - 99%)    O2 Parameters below as of 05 May 2024 07:30  Patient On (Oxygen Delivery Method): room air            Adult Advanced Hemodynamics Last 24 Hrs  CVP(mm Hg): --  CVP(cm H2O): --  CO: --  CI: --  PA: --  PA(mean): --  PCWP: --  SVR: --  SVRI: --  PVR: --  PVRI: --  CAPILLARY BLOOD GLUCOSE      POCT Blood Glucose.: 262 mg/dL (04 May 2024 22:01)  POCT Blood Glucose.: 109 mg/dL (04 May 2024 16:19)  POCT Blood Glucose.: 122 mg/dL (04 May 2024 12:08)  POCT Blood Glucose.: 154 mg/dL (04 May 2024 08:18)    CAPILLARY BLOOD GLUCOSE      POCT Blood Glucose.: 262 mg/dL (04 May 2024 22:01)    I&O's Summary    04 May 2024 07:01  -  05 May 2024 07:00  --------------------------------------------------------  IN: 1535.5 mL / OUT: 4380 mL / NET: -2844.5 mL      Daily     Daily Weight in k.8 (05 May 2024 04:00)    PHYSICAL EXAMINATION:  General: WN/WD NAD  HEENT: PERRLA, EOMI, moist mucous membranes  Neurology: A&Ox3, nonfocal, PEREZ x 4  Respiratory: CTA B/L, normal respiratory effort, no wheezes, crackles, rales  CV: RRR, S1S2, no murmurs, rubs or gallops  Abdominal: Soft, NT, ND +BS, Last BM  Extremities: No edema, + peripheral pulses  Incisions:   Tubes:    LABS:  ABG - ( 05 May 2024 04:00 )  pH, Arterial: 7.39  pH, Blood: x     /  pCO2: 45    /  pO2: 70    / HCO3: 27    / Base Excess: 1.9   /  SaO2: 96.9                                    8.0    9.22  )-----------( 205      ( 05 May 2024 04:09 )             25.5     05-05    130<L>  |  93<L>  |  39<H>  ----------------------------<  277<H>  3.8   |  24  |  2.83<H>    Ca    8.3<L>      05 May 2024 04:09  Phos  5.1     05-05  Mg     2.2     05-05    TPro  6.3  /  Alb  2.9<L>  /  TBili  0.5  /  DBili  x   /  AST  13  /  ALT  10  /  AlkPhos  80  05-05    LIVER FUNCTIONS - ( 05 May 2024 04:09 )  Alb: 2.9 g/dL / Pro: 6.3 g/dL / ALK PHOS: 80 U/L / ALT: 10 U/L / AST: 13 U/L / GGT: x                   Urinalysis Basic - ( 05 May 2024 04:09 )    Color: x / Appearance: x / SG: x / pH: x  Gluc: 277 mg/dL / Ketone: x  / Bili: x / Urobili: x   Blood: x / Protein: x / Nitrite: x   Leuk Esterase: x / RBC: x / WBC x   Sq Epi: x / Non Sq Epi: x / Bacteria: x      ====================ASSESSMENT ==============  73F PMH: HFrEF (EF 30%), AS, LBBB, HTN, DM1, CKD, hypothyroidism, chronic lymphedema, LELIA (3L home O2) p/w for syncope 2/2 severe AS, s/p TAVR . Course complicated by contrast induced allergic reaction (had CTA for TAVR eval) and contrast related renal failure (acute on chronic) requiring HD. TAVR c/b VT and vfib requiring shock and flash pulmonary edema causing AHRF requiring intubation. Currently extubated,  on CVVPHD for fluid removal, and on pressor support for vasoplegia and hypovolemia due to CRRT.    NEURO:  - Extubated   - Currently AOx4  - OOBTC, c/w PT as tolerated  - PT recommends HONEY for eventual dispo    PULM  # AHRF 2/2 flash pulmonary edema  # LELIA (on 3L home oxygen)  - Extubated   -  CXR - no consolidation or pleural effusion    CV  #Shock, Vasoplegic  -  vasoplegia from sedation vs losing fluid from CVVHD vs distributive septic shock  - on vasopressin 0.02, maintain for renal perfusion as UO significantly drops when Vaso turned off despite holding BP  - endo ruling out Cushing's   - Midodrine 20 TID     #VT/vfib s/p shock  #2:1 AV block  #Severe AS s/p TAVR  - Known LBBB with first degree AV block on telemetry  - s/p Micra , no longer requiring TVP (eventual candidate for CRT per EP)  - changes noted on telemetry requiring EP f/u and possible interrogation of device    # HFrEF  - EF 25%  - Holding GDMT while on pressors    /RENAL  #ESRD  - i/s/o ATN and contrast nephropathy  - c/w pressor support   - Strict I/Os  - Trend BMP, lytes  - Avoid nephrotoxins  - Nephro following- pt still remains fluid overloaded, start IHD likely tomorrow  - start midodrine support 10 mg q8h  - c/w bumex drip 2mg/hr and monitor for UOP  - monitor daily for initiation of HD eventually    GI  - tolerating  diet    ENDO  # DM1  - on  basal/bolus lantus 32 QHS, admelog dec to 8u TIDAC   - monitor POCT FS, goal 140-180    # Hypothyroidism  - c/w home levothyroxine 75mcg    #Cushing  # L adrenal nodule  -dexamethasone suppression test w/ am cortisol x2 completed, pt is pos for Cushing's disease, f/u outpt, recheck once critical illness resolves  -f/u [place] salivary cortisol  - CT A/P demonstrating normal adrenals    SKIN  # Acute generalized exanthematous pustulosis   - c/w calamine,  Vaseline    # Psoriasis   # Lymphedema   # R. Diabetic foot ulcer  - Elevate legs  - Compression stockings, ACE wrapping  - Podiatry following- foot wounds management, decubiti precautions f/u outpt, check for iodine gel allergy    ID  #leukocytosis  - afebrile, bcx neg  - Monitor WBC and for fevers, and signs of infection    # Oral Candidiasis  - c/w nystatin     Heme  #Anemia, worsening  -AARTI vs AOCD vs anemia 2/2 blood loss vs myelosuppression iso critical illness  -trend H&H, transfuse PRN    #thrombocytopenia  -  myelosuppression iso critical illness  - no active signs of bleeding, monitor plts, transfuse PRN

## 2024-05-05 NOTE — PROGRESS NOTE ADULT - ASSESSMENT
73F w HFrEF (EF 30%), mod AS, known LBBB, HTN, IDDM1, CKD, hypothyroidism, chronic lymphedema, p/w 1 episode of syncope with R arm jerking.     #Syncope-Aortic Stenosis  - Known Aortic Stenosis likely Severe in setting of decreased LVEF  - s/p tavr on 4/24 c/b VT -> shock -> VFib -> shock  - hypoxic/congested, and was intubated, now extubated on 4/25 in the evening, on NC   - on 4/25 with worsening bradycardia, and now s/p TVP placement followed by AV Micra placement with removal of TVP  - Remains in Sinus Rhythm/known lbbb  - cont pressors as needed, currently on Vaso  - HD per renal, CVVH on hold this morning    #Chronic Systolic HF (EF 30%), mod to severe AS, HTN, LBBB, Lymphedema   - Has known systolic HF and AS.    - Repeat TTE showed EF 30%, mild-mod LVH, mildly reduced RVF, mod-severe AS (.61 cmsq), mod pHTN  - On home Torsemide 20 mg daily, Eplerenone 25 mg daily, and Toprol  mg daily, all currently on hold  - proBNP 51K from 80k.   - GDMT on hold in the setting of shock  -On Bumex gtt 2mg    #LBBB  Known LBBB  Noted intermittent Wenckebach on telemetry with bradycardia and 2:1 AV conduction  - s/p tvp placement followed by AV Micra placement and removal of TVP  - Sinus Rhythm    - very high risk of decompensation

## 2024-05-05 NOTE — PROGRESS NOTE ADULT - TIME BILLING
- Review of records, telemetry, vital signs and daily labs.   - General and cardiovascular physical examination.  - Generation of cardiovascular treatment plan.  - Coordination of care.      Patient was seen and examined by me on 05/05/2024,interim events noted,labs and radiology studies reviewed.  Cuco Tubbs MD,FACC.  [address] [place], [place] [id number].  [phone]

## 2024-05-05 NOTE — PROGRESS NOTE ADULT - ASSESSMENT
73F w HFrEF (EF 30%), mod AS, known LBBB, HTN, T1DM, CKD, hypothyroidism, chronic lymphedema, suspected OHS/LELIA on 3L NC home O2 p/w 1 episode of syncope with R arm jerking, likely 2/2 severe symptomatic AS. CTA imagings performed for TAVR eval, c/b DUNCAN on CKD. C/b febrile and hypotension, sepsis workup pending. CT c/a/p w/w/o IV contrast revealed incidental finding of L adrenal nodule.    #T1DM  #DKA  Has hx of T1DM since age 26  Endocrinologist: Dr. Luis Dumont  Home regimen: Lantus 33 units qhs, Novolog based on sliding scale TIDAC normally 3-5 units  Has Dexcom G7  A1c is 7.4%  C peptide undetectable <0.1 with glucose 107, confirming insulin deficiency  Transitioned to basal/bolus from insulin gtt  BG slightly elevated this morning  PLAN  - Increase Lantus to 34 units nightly   - c/w Admelog to 8 units TIDAC  - Low dose admelog correction scale TIDAC, Low dose admelog correction scale QHS  - Goal BG in -180  - Of note, patient instructed on discharge from recent hospitalization at [facility] to start farxiga for CHF. Given risks of DKA of SGLT2i in T1DM, would not start until better data is available for its benefits.  - Discharge regimen: home basal/bolus insulin, dose TBD.  - Follow up with Dr. Luis Dumont outpatient    #L adrenal nodule    Incidentally found on CT c/a/p w/wo IV contrast done for TAVR evaluation. The left adrenal gland is thickened and a 1.2 x 0.8 cm left adrenal nodule is seen. The right adrenal gland is normal.   Primary team discussed with radiology. HU of the adrenal nodule not able to be accurately determined due to motion artifact, also given imaging was taken at venous phase.    Patient had another CT scan which showed normal adrenals on 5/3/24    Test for excess adrenal hormones:   - Dex suppression test: AM cortisol 9.4 not suppressed with sufficiently high dexamethasone 374 (4/18/24). Repeat test AM cortisol 4.1 not suppressed with ACTH 9.5, dex level 449 (4/20/24). Concerning for possible Cushings, likely primary adrenal source given ACTH not elevated, however, patient is also in ICU on pressors and in a stressed state. Urine cortisol low at 1.2mcg/24 hour but only 200ml for 24 hours. Salivary cortisol cancelled for QNS, repeat salivary cortisol specimen received   - plasma metanephrines 46.8 normal range    - serum aldosterone is elevated to 37.4. Plasma renin activity 9.775. Ratio = 3.82, not elevated, no hyperaldosteronism.    - DHEAS 139 wnl. Androstenedione <10.    PLAN  - Patient with 2 positive DST - concerning for cushing syndrome. No indication for treatment at this time, would recommend repeating testing outpatient after patient resolved from acute critical illness - will give signout to patient's endocrinologist Dr. Luis Dumont prior to discharge   - Follow up salivary cortisol (specimen received)   - May need to repeat 24 hour urine cortisol once renal function improved and no longer on CRRT   - Will need follow up outpatient with Dr. Luis Dumont     #Hx of Hypothyroidism  TSH slightly elevated to 4.79-5.06 with normal FT4 1.3-1.5  - continue home LT4 75mcg daily  - repeat TFT's outpatient when clinically stabilized    ICU patient requires close monitoring.   Discussed recommendations with primary team provider.     Ana Nicholson DO   Attending Physician   Department of Endocrinology, Diabetes and Metabolism     If before 9AM or after 5PM, or on weekends/holidays, please call the Endocrine answering service for assistance ([phone]).  For nonurgent matters, please email [email or web address] for assistance.

## 2024-05-05 NOTE — PROGRESS NOTE ADULT - SUBJECTIVE AND OBJECTIVE BOX
EVELYN LOPEZ  [id number]  Patient is a 73y old  Female who presents with a chief complaint of Syncope (05 May 2024 19:18)    HPI:  73F w HFrEF (EF 30%), mod AS, known LBBB, HTN, IDDM1, CKD, hypothyroidism, chronic lymphedema, suspected OHS/LELIA on 3L NC home O2 p/w 1 episode of syncope. Recent admission at [facility] (3/29-4/5) for ADHF and DUNCAN s/p diuresis, discharged with new home O2 3L NC suspecting OHS/LELIA pending outpatient w/u. Since discharge a week ago, she has been staying at home with   Pt had sob walking to car. Once in car, she was still breathing heavily. Partner noticed pt was not responding to verbal stimuli for 10-15sec and witnessed R arm jerking. Pt gained consciousness quickly without postictal symptoms. Pt went to Cardiologist Dr. Nathan who recommended pt to come to ED. No fever, cp, abd pain, n/v. Leg swelling is improved from prior. Pt on torsemide 40mg which she has been taking. Pt was discharged on 3LNC which she is currently on. Patient reports she did not take Farxiga that she was discharged on as she was concerned about side effects.     In ED, patient was found afebrile, hemodynamically stable, breathing well on 3L NC. Initial labs notable for mild hyponatremia, DUNCAN on CKD, elevated proBNP and elevated pCO2 both improved from prior. (12 Apr 2024 16:31)      24 HOUR EVENTS:    REVIEW OF SYSTEMS:    CONSTITUTIONAL: No weakness, fevers or chills  EYES/ENT: No visual changes;  No vertigo or throat pain   NECK: No pain or stiffness  RESPIRATORY: No cough, wheezing, hemoptysis; No shortness of breath  CARDIOVASCULAR: No chest pain or palpitations  GASTROINTESTINAL: No abdominal or epigastric pain. No nausea, vomiting, or hematemesis; No diarrhea or constipation. No melena or hematochezia.  GENITOURINARY: No dysuria, frequency or hematuria  NEUROLOGICAL: No numbness or weakness  SKIN: No itching, rashes      ICU Vital Signs Last 24 Hrs  T(C): 36.9 (05 May 2024 14:00), Max: 36.9 (05 May 2024 14:00)  T(F): 98.4 (05 May 2024 14:00), Max: 98.4 (05 May 2024 14:00)  HR: 98 (05 May 2024 18:30) (72 - 113)  BP: 123/56 (05 May 2024 18:00) (99/69 - 139/60)  BP(mean): 81 (05 May 2024 18:00) (77 - 89)  ABP: 94/37 (05 May 2024 18:30) (70/32 - 127/41)  ABP(mean): 56 (05 May 2024 18:30) (40 - 86)  RR: 20 (05 May 2024 18:30) (12 - 63)  SpO2: 95% (05 May 2024 18:30) (76% - 97%)    O2 Parameters below as of 05 May 2024 16:00  Patient On (Oxygen Delivery Method): room air            CVP(mm Hg): --  CO: --  CI: --  PA: --  PA(mean): --  PA(direct): --  PCWP: --  LA: --  RA: --  SVR: --  SVRI: --  PVR: --  PVRI: --  I&O's Summary    04 May 2024 07:01  -  05 May 2024 07:00  --------------------------------------------------------  IN: 1547 mL / OUT: 4530 mL / NET: -2983 mL    05 May 2024 07:01  -  05 May 2024 19:24  --------------------------------------------------------  IN: 1078 mL / OUT: 1540 mL / NET: -462 mL        CAPILLARY BLOOD GLUCOSE    CAPILLARY BLOOD GLUCOSE      POCT Blood Glucose.: 125 mg/dL (05 May 2024 16:34)      PHYSICAL EXAM:  GENERAL: No acute distress, well-developed  HEAD:  Atraumatic, Normocephalic  EYES: EOMI, PERRLA, conjunctiva and sclera clear  NECK: Supple, no lymphadenopathy, no JVD  CHEST/LUNG: CTAB; No wheezes, rales, or rhonchi  HEART: Regular rate and rhythm. Normal S1/S2. No murmurs, rubs, or gallops  ABDOMEN: Soft, non-tender, non-distended; normal bowel sounds, no organomegaly  EXTREMITIES:  2+ peripheral pulses b/l, No clubbing, cyanosis, or edema  NEUROLOGY: A&O x 3, no focal deficits  SKIN: No rashes or lesions    ============================I/O===========================   I&O's Detail    04 May 2024 07:01  -  05 May 2024 07:00  --------------------------------------------------------  IN:    Bumetanide: 240 mL    IV PiggyBack: 50 mL    Oral Fluid: 1200 mL    Vasopressin: 57 mL  Total IN: 1547 mL    OUT:    Indwelling Catheter - Urethral (mL): 4530 mL  Total OUT: 4530 mL    Total NET: -2983 mL      05 May 2024 07:01  -  05 May 2024 19:24  --------------------------------------------------------  IN:    Bumetanide: 25 mL    Bumetanide: 45 mL    IV PiggyBack: 250 mL    Oral Fluid: 740 mL    Vasopressin: 18 mL  Total IN: 1078 mL    OUT:    Indwelling Catheter - Urethral (mL): 1540 mL  Total OUT: 1540 mL    Total NET: -462 mL        ============================ LABS =========================                        8.0    9.22  )-----------( 205      ( 05 May 2024 04:09 )             25.5     05-05    132<L>  |  95<L>  |  42<H>  ----------------------------<  146<H>  3.7   |  23  |  2.75<H>    Ca    8.5      05 May 2024 13:50  Phos  4.3     05-05  Mg     2.0     05-05    TPro  6.4  /  Alb  3.2<L>  /  TBili  0.4  /  DBili  x   /  AST  13  /  ALT  9<L>  /  AlkPhos  80  05-05                LIVER FUNCTIONS - ( 05 May 2024 13:50 )  Alb: 3.2 g/dL / Pro: 6.4 g/dL / ALK PHOS: 80 U/L / ALT: 9 U/L / AST: 13 U/L / GGT: x             ABG - ( 05 May 2024 13:44 )  pH, Arterial: 7.40  pH, Blood: x     /  pCO2: 41    /  pO2: 83    / HCO3: 25    / Base Excess: 0.5   /  SaO2: 97.2              Blood Gas Arterial, Lactate: 1.4 mmol/L (05-05-24 @ 13:44)  Blood Gas Arterial, Lactate: 0.9 mmol/L (05-05-24 @ 04:00)  Blood Gas Arterial, Lactate: 1.3 mmol/L (05-04-24 @ 00:40)  Blood Gas Arterial, Lactate: 1.1 mmol/L (05-03-24 @ 17:02)  Blood Gas Arterial, Lactate: 0.9 mmol/L (05-03-24 @ 00:40)    Urinalysis Basic - ( 05 May 2024 13:50 )    Color: x / Appearance: x / SG: x / pH: x  Gluc: 146 mg/dL / Ketone: x  / Bili: x / Urobili: x   Blood: x / Protein: x / Nitrite: x   Leuk Esterase: x / RBC: x / WBC x   Sq Epi: x / Non Sq Epi: x / Bacteria: x      ======================Micro/Rad/Cardio=================  Telemetry: Reviewed   EKG: Reviewed  CXR: Reviewed  Culture: Reviewed   Echo:   Cath:   ======================================================  PAST MEDICAL & SURGICAL HISTORY:  Hypertension      Diabetes      Lymphedema      H/O left bundle branch block      History of left bundle branch block (LBBB)      Systolic heart failure, chronic      Type 1 diabetes      H/O cataract  2020      History of surgical removal of meniscus of knee  left in 1971      Frozen shoulder  2000      H/O Achilles tendon repair  lengthened bilaterally, 2000      Fractured skull  1968              ======================= LINES/TUBES  =====================  [place]: (Date placed)  Central Line: (Date placed)  HD Catheter: (Date placed)  Arterial Line: (Date placed)  Endotracheal Tube: (Date placed)  [place]: (Date placed)  ======================= DISPOSITION  =====================  [X] Critical   [ ] Guarded    [ ] Stable    [X] Maintain in CICU  [ ] Downgrade to Telemtry  [ ] Discharge Home    Patient requires continuous monitoring with bedside rhythm monitoring, pulse ox monitoring, and intermittent blood gas analysis. Care plan discussed with ICU care team. Patient remained critical and at risk for life threatening decompensation.  Patient seen, examined and plan discussed with CCU team during rounds.     I have personally provided 30 minutes of critical care time excluding time spent on separate procedures, in addition to initial critical care time provided by the CICU Attending, Dr. Sandoval/ Darek/ Yassine/ Brendon/ Tequila/ Mery .       Becka Horn [facility] [phone]      EVELYN LOPEZ  MRN-[id number]  Patient is a 73y old  Female who presents with a chief complaint of Syncope (05 May 2024 19:18)    HPI: 73F w HFrEF (EF 30%), mod AS, known LBBB, HTN, IDDM1, CKD, hypothyroidism, chronic lymphedema, suspected OHS/LELIA on 3L NC home O2 p/w 1 episode of syncope. Recent admission at \[facility]\"" (3/29-4/5) for ADHF and DUNCAN s/p diuresis, discharged with new home O2 3L NC suspecting OHS/LELIA pending outpatient w/u. Since discharge a week ago, she has been staying at home with   Pt had sob walking to car. Once in car, she was still breathing heavily. Partner noticed pt was not responding to verbal stimuli for 10-15sec and witnessed R arm jerking. Pt gained consciousness quickly without postictal symptoms. Pt went to Cardiologist Dr. Nathan who recommended pt to come to ED. No fever, cp, abd pain, n/v. Leg swelling is improved from prior. Pt on torsemide 40mg which she has been taking. Pt was discharged on 3LNC which she is currently on. Patient reports she did not take Farxiga that she was discharged on as she was concerned about side effects.  patient was found afebrile, hemodynamically stable, breathing well on 3L NC. Initial labs notable for mild hyponatremia, DUNCAN on CKD, elevated proBNP and elevated pCO2 both improved from prior. (12 Apr 2024 16:31)    REVIEW OF SYSTEMS:  CONSTITUTIONAL: No weakness, fevers or chills  EYES/ENT: No visual changes;  No vertigo or throat pain   NECK: No pain or stiffness  RESPIRATORY: No cough, wheezing, hemoptysis; No shortness of breath  CARDIOVASCULAR: No chest pain or palpitations  GASTROINTESTINAL: No abdominal or epigastric pain  No nausea, vomiting, or hematemesis; No diarrhea or constipation. No melena or hematochezia.  GENITOURINARY: No dysuria, frequency or hematuria  NEUROLOGICAL: No numbness or weakness  SKIN: No itching, rashes      ICU Vital Signs Last 24 Hrs  T(C): 36.9 (05 May 2024 14:00), Max: 36.9 (05 May 2024 14:00)  T(F): 98.4 (05 May 2024 14:00), Max: 98.4 (05 May 2024 14:00)  HR: 98 (05 May 2024 18:30) (72 - 113)  BP: 123/56 (05 May 2024 18:00) (99/69 - 139/60)  BP(mean): 81 (05 May 2024 18:00) (77 - 89)  ABP: 94/37 (05 May 2024 18:30) (70/32 - 127/41)  ABP(mean): 56 (05 May 2024 18:30) (40 - 86)  RR: 20 (05 May 2024 18:30) (12 - 63)  SpO2: 95% (05 May 2024 18:30) (76% - 97%)    O2 Parameters below as of 05 May 2024 16:00  Patient On (Oxygen Delivery Method): room air      I&O's Summary    04 May 2024 07:01  -  05 May 2024 07:00  --------------------------------------------------------  IN: 1547 mL / OUT: 4530 mL / NET: -2983 mL    05 May 2024 07:01  -  05 May 2024 19:24  --------------------------------------------------------  IN: 1078 mL / OUT: 1540 mL / NET: -462 mL      CAPILLARY BLOOD GLUCOSE  POCT Blood Glucose.: 125 mg/dL (05 May 2024 16:34)  ============================I/O===========================   I&O's Detail    04 May 2024 07:01  -  05 May 2024 07:00  --------------------------------------------------------  IN:    Bumetanide: 240 mL    IV PiggyBack: 50 mL    Oral Fluid: 1200 mL    Vasopressin: 57 mL  Total IN: 1547 mL    OUT:    Indwelling Catheter - Urethral (mL): 4530 mL  Total OUT: 4530 mL    Total NET: -2983 mL      05 May 2024 07:01  -  05 May 2024 19:24  --------------------------------------------------------  IN:    Bumetanide: 25 mL    Bumetanide: 45 mL    IV PiggyBack: 250 mL    Oral Fluid: 740 mL    Vasopressin: 18 mL  Total IN: 1078 mL    OUT:    Indwelling Catheter - Urethral (mL): 1540 mL  Total OUT: 1540 mL    Total NET: -462 mL  ============================ LABS =========================                      8.0    9.22  )-----------( 205      ( 05 May 2024 04:09 )             25.5     05-05    132<L>  |  95<L>  |  42<H>  ----------------------------<  146<H>  3.7   |  23  |  2.75<H>    Ca    8.5      05 May 2024 13:50  Phos  4.3     05-05  Mg     2.0     05-05    TPro  6.4  /  Alb  3.2<L>  /  TBili  0.4  /  DBili  x   /  AST  13  /  ALT  9<L>  /  AlkPhos  80  05-05    LIVER FUNCTIONS - ( 05 May 2024 13:50 )  Alb: 3.2 g/dL / Pro: 6.4 g/dL / ALK PHOS: 80 U/L / ALT: 9 U/L / AST: 13 U/L / GGT: x           ABG - ( 05 May 2024 13:44 )  pH, Arterial: 7.40  pH, Blood: x     /  pCO2: 41    /  pO2: 83    / HCO3: 25    / Base Excess: 0.5   /  SaO2: 97.2      Blood Gas Arterial, Lactate: 1.4 mmol/L (05-05-24 @ 13:44)  Blood Gas Arterial, Lactate: 0.9 mmol/L (05-05-24 @ 04:00)  Blood Gas Arterial, Lactate: 1.3 mmol/L (05-04-24 @ 00:40)  Blood Gas Arterial, Lactate: 1.1 mmol/L (05-03-24 @ 17:02)  Blood Gas Arterial, Lactate: 0.9 mmol/L (05-03-24 @ 00:40)    Urinalysis Basic - ( 05 May 2024 13:50 )    Color: x / Appearance: x / SG: x / pH: x  Gluc: 146 mg/dL / Ketone: x  / Bili: x / Urobili: x   Blood: x / Protein: x / Nitrite: x   Leuk Esterase: x / RBC: x / WBC x   Sq Epi: x / Non Sq Epi: x / Bacteria: x  ======================Micro/Rad/Cardio=================  Telemetry: Reviewed   EKG: Reviewed  CXR: Reviewed  Culture: Reviewed   Echo: Reviewed   Cath: Reviewed   ======================================================  PAST MEDICAL & SURGICAL HISTORY:  Hypertension      Diabetes      Lymphedema      H/O left bundle branch block      History of left bundle branch block (LBBB)      Systolic heart failure, chronic      Type 1 diabetes      H/O cataract  2020      History of surgical removal of meniscus of knee  left in 1971      Frozen shoulder  2000      H/O Achilles tendon repair  lengthened bilaterally, 2000      Fractured skull  1968    A/P: 73F PMH: HFrEF (EF 30%), AS, LBBB, HTN, DM1, CKD, hypothyroidism, chronic lymphedema, LELIA (3L home O2) p/w for syncope 2/2 severe AS, s/p TAVR 4/24. Course complicated by contrast induced allergic reaction (had CTA for TAVR eval) and contrast related renal failure (acute on chronic) requiring HD. TAVR c/b VT and vfib requiring shock and flash pulmonary edema causing AHRF requiring intubation. Currently extubated,  on CVVPHD for fluid removal, and on pressor support for vasoplegia and hypovolemia due to CRRT.    NEURO:  - Extubated 4/26  - Currently AOx4  - OOBTC, c/w PT as tolerated  - PT recommends HONEY for eventual dispo    PULM  # AHRF 2/2 flash pulmonary edema  # LELIA (on 3L home oxygen)  - Extubated 4/26  - 4/27 CXR - no consolidation or pleural effusion    CV  #Shock, Vasoplegic  -  vasoplegia from sedation vs losing fluid from CVVHD vs distributive septic shock  - on vasopressin 0.02, maintain for renal perfusion as UO significantly drops when Vaso turned off despite holding BP  - endo ruling out Cushing's   - Midodrine 20 TID     #VT/vfib s/p shock  #2:1 AV block  #Severe AS s/p TAVR  - Known LBBB with first degree AV block on telemetry  - s/p Micra 4/25, no longer requiring TVP (eventual candidate for CRT per EP)  - changes noted on telemetry requiring EP f/u and possible interrogation of device    # HFrEF  - EF 25%  - Holding GDMT while on pressors    /RENAL  #ESRD  - i/s/o ATN and contrast nephropathy  - c/w pressor support   - Strict I/Os  - Trend BMP, lytes  - Avoid nephrotoxins  - Nephro following- pt still remains fluid overloaded, start IHD likely tomorrow  - start midodrine support 10 mg q8h  - c/w bumex drip 2mg/hr and monitor for UOP  - monitor daily for initiation of HD eventually    GI  - tolerating  diet    ENDO  # DM1  - on  basal/bolus lantus 32 QHS, admelog dec to 8u TIDAC   - monitor POCT FS, goal 140-180    # Hypothyroidism  - c/w home levothyroxine 75mcg    #Cushing  # L adrenal nodule  -dexamethasone suppression test w/ am cortisol x2 completed, pt is pos for Cushing's disease, f/u outpt, recheck once critical illness resolves  -f/u [place] salivary cortisol  - CT A/P demonstrating normal adrenals    SKIN  # Acute generalized exanthematous pustulosis   - c/w calamine,  Vaseline    # Psoriasis   # Lymphedema   # R. Diabetic foot ulcer  - Elevate legs  - Compression stockings, ACE wrapping  - Podiatry following- foot wounds management, decubiti precautions f/u outpt, check for iodine gel allergy    ID  #leukocytosis  - afebrile, bcx neg  - Monitor WBC and for fevers, and signs of infection    # Oral Candidiasis  - c/w nystatin     Heme  #Anemia, worsening  -AARTI vs AOCD vs anemia 2/2 blood loss vs myelosuppression iso critical illness  -trend H&H, transfuse PRN    #thrombocytopenia  -  myelosuppression iso critical illness  - no active signs of bleeding, monitor plts, transfuse PRN    ======================= DISPOSITION  =====================  [X] Critical   [ ] Guarded    [ ] Stable    [X] Maintain in CICU  [ ] Downgrade to Telemetry  [ ] Discharge Home    Patient requires continuous monitoring with bedside rhythm monitoring, pulse ox monitoring, and intermittent blood gas analysis. Care plan discussed with ICU care team. Patient remained critical and at risk for life threatening decompensation.  Patient seen, examined and plan discussed with CCU team during rounds.     I have personally provided 30 minutes of critical care time excluding time spent on separate procedures, in addition to initial critical care time provided by the CICU Attending, Dr. Jaime Horn [facility]  CICU [phone]

## 2024-05-05 NOTE — PROGRESS NOTE ADULT - SUBJECTIVE AND OBJECTIVE BOX
[facility]   [phone]  RENAL FOLLOW UP NOTE  --------------------------------------------------------------------------------  Chief Complaint:    24 hour events/subjective:  seen earlier denies worsening sob       PAST HISTORY  --------------------------------------------------------------------------------  No significant changes to PMH, PSH, FHx, SHx, unless otherwise noted    ALLERGIES & MEDICATIONS  --------------------------------------------------------------------------------  Allergies    contrast media (iodine-based) (Fever; Vomiting; Flushing; Hypotension; Rash; Stomach Upset)  Ativan (Rash; Urticaria)  penicillins (Hives (Mod to Severe); Anaphylaxis (Mod to Severe); Short breath (Mod to Severe); Angioedema (Mod to Severe))    Intolerances      Standing Inpatient Medications  ascorbic acid 500 milliGRAM(s) Oral daily  aspirin  chewable 81 milliGRAM(s) Oral daily  atorvastatin 80 milliGRAM(s) Oral at bedtime  buMETAnide Infusion 1 mG/Hr IV Continuous <Continuous>  calamine/zinc oxide Lotion 1 Application(s) Topical three times a day  chlorhexidine 2% Cloths 1 Application(s) Topical daily  chlorhexidine 4% Liquid 1 Application(s) Topical <User Schedule>  epoetin mendoza-epbx (RETACRIT) Injectable 44559 Unit(s) IV Push <User Schedule>  ferrous    sulfate 325 milliGRAM(s) Oral two times a day  heparin   Injectable 5000 Unit(s) SubCutaneous every 8 hours  insulin glargine Injectable (LANTUS) 32 Unit(s) SubCutaneous at bedtime  insulin lispro (ADMELOG) corrective regimen sliding scale   SubCutaneous Before meals and at bedtime  insulin lispro Injectable (ADMELOG) 8 Unit(s) SubCutaneous three times a day before meals  levothyroxine 75 MICROGram(s) Oral daily  midodrine 20 milliGRAM(s) Oral every 8 hours  multivitamin 1 Tablet(s) Oral daily  mupirocin 2% Ointment 1 Application(s) Topical three times a day  senna 2 Tablet(s) Oral at bedtime  vasopressin Infusion 0.02 Unit(s)/Min IV Continuous <Continuous>    PRN Inpatient Medications  polyethylene glycol 3350 17 Gram(s) Oral two times a day PRN      REVIEW OF SYSTEMS  --------------------------------------------------------------------------------    Gen: denies  fevers/chills,  CVS: denies chest pain/palpitations  Resp: denies SOB/Cough  GI: Denies N/V/Abd pain  : Denies dysuria    VITALS/PHYSICAL EXAM  --------------------------------------------------------------------------------  T(C): 36.9 (05-05-24 @ 14:00), Max: 36.9 (05-05-24 @ 14:00)  HR: 98 (05-05-24 @ 18:30) (72 - 113)  BP: 123/56 (05-05-24 @ 18:00) (99/69 - 139/60)  RR: 20 (05-05-24 @ 18:30) (12 - 63)  SpO2: 95% (05-05-24 @ 18:30) (76% - 97%)  Wt(kg): --        05-04-24 @ 07:01  -  05-05-24 @ 07:00  --------------------------------------------------------  IN: 1547 mL / OUT: 4530 mL / NET: -2983 mL    05-05-24 @ 07:01  -  05-05-24 @ 19:18  --------------------------------------------------------  IN: 1078 mL / OUT: 1540 mL / NET: -462 mL      Physical Exam:  	    Gen:  on RA, comfortable appearing   	Pulm: decrease bs  no rales or ronchi or wheezing  	CV: No JVD. RRR, S1S2; no rub II/VI M   	Abd: +BS, soft, nontender/nondistended, obese   	UE: Warm, no cyanosis  no clubbing,   +  edema  	LE: Warm, no cyanosis  no clubbing, 2+  softer  edema, B/L   	Neuro: alert and oriented               Vascular Access: s/p HD cath removal       LABS/STUDIES  --------------------------------------------------------------------------------              8.0    9.22  >-----------<  205      [05-05-24 @ 04:09]              25.5     132  |  95  |  42  ----------------------------<  146      [05-05-24 @ 13:50]  3.7   |  23  |  2.75        Ca     8.5     [05-05-24 @ 13:50]      Mg     2.0     [05-05-24 @ 13:50]      Phos  4.3     [05-05-24 @ 13:50]    TPro  6.4  /  Alb  3.2  /  TBili  0.4  /  DBili  x   /  AST  13  /  ALT  9   /  AlkPhos  80  [05-05-24 @ 13:50]          Creatinine Trend:  SCr 2.75 [05-05 @ 13:50]  SCr 2.83 [05-05 @ 04:09]  SCr 2.76 [05-04 @ 22:11]  SCr 2.36 [05-04 @ 06:11]  SCr 2.37 [05-04 @ 00:45]    PTH -- (Ca 8.4)      [04-19-24 @ 17:54]   109  TSH 5.06      [04-14-24 @ 07:18]  Lipid: chol 74, TG 70, HDL 33, LDL --      [04-13-24 @ 07:05]

## 2024-05-05 NOTE — PROGRESS NOTE ADULT - SUBJECTIVE AND OBJECTIVE BOX
Interval history:  BG slightly elevated today in the 200s  Denies any complaints today  Appetite is good     MEDICATIONS  (STANDING):  ascorbic acid 500 milliGRAM(s) Oral daily  aspirin  chewable 81 milliGRAM(s) Oral daily  atorvastatin 80 milliGRAM(s) Oral at bedtime  buMETAnide Infusion 1 mG/Hr (5 mL/Hr) IV Continuous <Continuous>  calamine/zinc oxide Lotion 1 Application(s) Topical three times a day  chlorhexidine 2% Cloths 1 Application(s) Topical daily  chlorhexidine 4% Liquid 1 Application(s) Topical <User Schedule>  epoetin mendoza-epbx (RETACRIT) Injectable 72317 Unit(s) IV Push <User Schedule>  ferrous    sulfate 325 milliGRAM(s) Oral two times a day  heparin   Injectable 5000 Unit(s) SubCutaneous every 8 hours  insulin glargine Injectable (LANTUS) 32 Unit(s) SubCutaneous at bedtime  insulin lispro (ADMELOG) corrective regimen sliding scale   SubCutaneous Before meals and at bedtime  insulin lispro Injectable (ADMELOG) 8 Unit(s) SubCutaneous three times a day before meals  levothyroxine 75 MICROGram(s) Oral daily  midodrine 20 milliGRAM(s) Oral every 8 hours  multivitamin 1 Tablet(s) Oral daily  mupirocin 2% Ointment 1 Application(s) Topical three times a day  senna 2 Tablet(s) Oral at bedtime  vasopressin Infusion 0.02 Unit(s)/Min (3 mL/Hr) IV Continuous <Continuous>    MEDICATIONS  (PRN):  polyethylene glycol 3350 17 Gram(s) Oral two times a day PRN Constipation      Allergies    contrast media (iodine-based) (Fever; Vomiting; Flushing; Hypotension; Rash; Stomach Upset)  Ativan (Rash; Urticaria)  penicillins (Hives (Mod to Severe); Anaphylaxis (Mod to Severe); Short breath (Mod to Severe); Angioedema (Mod to Severe))    Intolerances      Review of Systems:  Constitutional: No fever  Eyes: No blurry vision  Neuro: No tremors  HEENT: No pain  Cardiovascular: No chest pain, palpitations  Respiratory: No SOB, no cough  GI: No nausea, vomiting, abdominal pain  : No dysuria  Skin: no rash  Psych: no depression  Endocrine: no polyuria, polydipsia  Hem/lymph: no swelling  Osteoporosis: no fractures    ALL OTHER SYSTEMS REVIEWED AND NEGATIVE    UNABLE TO OBTAIN    PHYSICAL EXAM:  VITALS: T(C): 36.9 (05-05-24 @ 14:00)  T(F): 98.4 (05-05-24 @ 14:00), Max: 98.7 (05-04-24 @ 19:00)  HR: 86 (05-05-24 @ 14:00) (72 - 115)  BP: 139/60 (05-05-24 @ 14:00) (99/69 - 139/60)  RR:  (12 - 63)  SpO2:  (76% - 99%)  Wt(kg): --  GENERAL: NAD, well-groomed, well-developed  EYES: No proptosis, no lid lag, anicteric  HEENT:  Atraumatic, Normocephalic, moist mucous membranes  THYROID: Normal size, no palpable nodules  RESPIRATORY: Clear to auscultation bilaterally; No rales, rhonchi, wheezing, or rubs  CARDIOVASCULAR: Regular rate and rhythm; No murmurs; no peripheral edema  GI: Soft, nontender, non distended, normal bowel sounds  SKIN: Dry, intact, No rashes or lesions  MUSCULOSKELETAL: Full range of motion, normal strength  NEURO: sensation intact, extraocular movements intact, no tremor, normal reflexes  PSYCH: Alert and oriented x 3, normal affect, normal mood  CUSHING'S SIGNS: no striae    POCT Blood Glucose.: 162 mg/dL (05-05-24 @ 12:23)  POCT Blood Glucose.: 271 mg/dL (05-05-24 @ 08:15)  POCT Blood Glucose.: 262 mg/dL (05-04-24 @ 22:01)  POCT Blood Glucose.: 109 mg/dL (05-04-24 @ 16:19)  POCT Blood Glucose.: 122 mg/dL (05-04-24 @ 12:08)  POCT Blood Glucose.: 154 mg/dL (05-04-24 @ 08:18)  POCT Blood Glucose.: 210 mg/dL (05-03-24 @ 21:43)  POCT Blood Glucose.: 199 mg/dL (05-03-24 @ 16:20)  POCT Blood Glucose.: 242 mg/dL (05-03-24 @ 12:04)  POCT Blood Glucose.: 240 mg/dL (05-03-24 @ 07:56)  POCT Blood Glucose.: 114 mg/dL (05-02-24 @ 23:20)  POCT Blood Glucose.: 85 mg/dL (05-02-24 @ 22:06)  POCT Blood Glucose.: 75 mg/dL (05-02-24 @ 22:04)  POCT Blood Glucose.: 102 mg/dL (05-02-24 @ 16:57)      05-05    132<L>  |  95<L>  |  42<H>  ----------------------------<  146<H>  3.7   |  23  |  2.75<H>    eGFR: 18<L>    Ca    8.5      05-05  Mg     2.0     05-05  Phos  4.3     05-05    TPro  6.4  /  Alb  3.2<L>  /  TBili  0.4  /  DBili  x   /  AST  13  /  ALT  9<L>  /  AlkPhos  80  05-05          Thyroid Function Tests:  04-14 @ 07:18 TSH 5.06 FreeT4 1.3 T3 -- Anti TPO -- Anti Thyroglobulin Ab -- TSI --  04-13 @ 07:05 TSH 4.79 FreeT4 1.5 T3 -- Anti TPO -- Anti Thyroglobulin Ab -- TSI --

## 2024-05-05 NOTE — PROGRESS NOTE ADULT - SUBJECTIVE AND OBJECTIVE BOX
MR#[id number]  PATIENT NAME:JOHNATHAN LOPEZ    DATE OF SERVICE: 05-05-24 @ 07:45  Patient was seen and examined by Cuco Tubbs MD on    05-05-24 @ 07:45 .  Interim events noted.Consultant notes ,Labs,Telemetry reviewed by me       HOSPITAL COURSE: HPI:  73F w HFrEF (EF 30%), mod AS, known LBBB, HTN, IDDM1, CKD, hypothyroidism, chronic lymphedema, suspected OHS/LELIA on 3L NC home O2 p/w 1 episode of syncope. Recent admission at [facility] (3/29-4/5) for ADHF and DUNCAN s/p diuresis, discharged with new home O2 3L NC suspecting OHS/LELIA pending outpatient w/u. Since discharge a week ago, she has been staying at home with   Pt had sob walking to car. Once in car, she was still breathing heavily. Partner noticed pt was not responding to verbal stimuli for 10-15sec and witnessed R arm jerking. Pt gained consciousness quickly without postictal symptoms. Pt went to Cardiologist Dr. Nathan who recommended pt to come to ED. No fever, cp, abd pain, n/v. Leg swelling is improved from prior. Pt on torsemide 40mg which she has been taking. Pt was discharged on 3LNC which she is currently on. Patient reports she did not take Farxiga that she was discharged on as she was concerned about side effects.     In ED, patient was found afebrile, hemodynamically stable, breathing well on 3L NC. Initial labs notable for mild hyponatremia, DUNCAN on CKD, elevated proBNP and elevated pCO2 both improved from prior. (12 Apr 2024 16:31)          INTERIM EVENTS:Patient seen at bedside ,interim events noted.  Pt. went for TAVR on 2/24 which was c/b VT/VF requiring shock and flash pulmonary edema causing AHRF requiring intubation. Extubated 4/26  Currently undergoing HD for hyperkalemia and fluid overload.   Currently on pressor support for possible vasoplegic shock, requiring management in CICU.  - s/p Micra 4/25  - Extubated 4/26  -5/4-Remains on vaso .01 & Bumex gtt at 2  -5/5 Awake not dyspneac     PMH -reviewed admission note, no change since admission    MEDICATIONS  (STANDING):  ascorbic acid 500 milliGRAM(s) Oral daily  aspirin  chewable 81 milliGRAM(s) Oral daily  atorvastatin 80 milliGRAM(s) Oral at bedtime  buMETAnide Infusion 2 mG/Hr (10 mL/Hr) IV Continuous <Continuous>  calamine/zinc oxide Lotion 1 Application(s) Topical three times a day  chlorhexidine 2% Cloths 1 Application(s) Topical daily  chlorhexidine 4% Liquid 1 Application(s) Topical <User Schedule>  epoetin mendoza-epbx (RETACRIT) Injectable 26060 Unit(s) IV Push <User Schedule>  ferrous    sulfate 325 milliGRAM(s) Oral two times a day  heparin   Injectable 5000 Unit(s) SubCutaneous every 8 hours  insulin glargine Injectable (LANTUS) 32 Unit(s) SubCutaneous at bedtime  insulin lispro (ADMELOG) corrective regimen sliding scale   SubCutaneous Before meals and at bedtime  insulin lispro Injectable (ADMELOG) 8 Unit(s) SubCutaneous three times a day before meals  levothyroxine 75 MICROGram(s) Oral daily  midodrine 20 milliGRAM(s) Oral every 8 hours  multivitamin 1 Tablet(s) Oral daily  mupirocin 2% Ointment 1 Application(s) Topical three times a day  nystatin    Suspension 074195 Unit(s) Oral four times a day  petrolatum white Ointment 1 Application(s) Topical three times a day  senna 2 Tablet(s) Oral at bedtime  vasopressin Infusion 0.02 Unit(s)/Min (3 mL/Hr) IV Continuous <Continuous>    MEDICATIONS  (PRN):  polyethylene glycol 3350 17 Gram(s) Oral two times a day PRN Constipation            REVIEW OF SYSTEMS:  Constitutional: [ ] fever, [ ]weight loss,  [ ]fatigue [ ]weight gain  Eyes: [ ] visual changes  Respiratory: [ ]shortness of breath;  [ ] cough, [ ]wheezing, [ ]chills, [ ]hemoptysis  Cardiovascular: [ ] chest pain, [ ]palpitations, [ ]dizziness,  [ ]leg swelling[ ]orthopnea[ ]PND  Gastrointestinal: [ ] abdominal pain, [ ]nausea, [ ]vomiting,  [ ]diarrhea [ ]Constipation [ ]Melena  Genitourinary: [ ] dysuria, [ ] hematuria [ ]De La Garza  Neurologic: [ ] headaches [ ] tremors[ ]weakness [ ]Paralysis Right[ ] Left[ ]  Skin: [ ] itching, [ ]burning, [ ] rashes  Endocrine: [ ] heat or cold intolerance  Musculoskeletal: [ ] joint pain or swelling; [ ] muscle, back, or extremity pain  Psychiatric: [ ] depression, [ ]anxiety, [ ]mood swings, or [ ]difficulty sleeping  Hematologic: [ ] easy bruising, [ ] bleeding gums    [ ] All remaining systems negative except as per above.   [ ]Unable to obtain.  [x] No change in ROS since admission      Vital Signs Last 24 Hrs  T(C): 36.4 (05 May 2024 03:00), Max: 37.1 (04 May 2024 19:00)  T(F): 97.5 (05 May 2024 03:00), Max: 98.7 (04 May 2024 19:00)  HR: 73 (05 May 2024 07:30) (73 - 115)  BP: --107/36  RR: 23 (05 May 2024 07:30) (12 - 63)  SpO2: 95% (05 May 2024 07:30) (90% - 99%)    Parameters below as of 05 May 2024 07:30  Patient On (Oxygen Delivery Method): room air      I&O's Summary    04 May 2024 07:01  -  05 May 2024 07:00  --------------------------------------------------------  IN: 1535.5 mL / OUT: 4380 mL / NET: -2844.5 mL        PHYSICAL EXAM:  General: No acute distress BMI-34  HEENT: EOMI, PERRL  Neck: Supple, [ ] JVD  Lungs: Equal air entry bilaterally; [ ] rales [ ] wheezing [ ] rhonchi  Heart: Regular rate and rhythm; [x ] murmur   2/6 [ x] systolic [ ] diastolic [ ] radiation[ ] rubs [ ]  gallops  Abdomen: Nontender, bowel sounds present  Extremities: No clubbing, cyanosis, [x ] lymphedema [ ]Pulses  equal and intact  Nervous system:  Alert & Oriented X3, no focal deficits  Psychiatric: Normal affect  Skin: No rashes or lesions    LABS:  05-05    130<L>  |  93<L>  |  39<H>  ----------------------------<  277<H>  3.8   |  24  |  2.83<H>    Ca    8.3<L>      05 May 2024 04:09  Phos  5.1     05-05  Mg     2.2     05-05    TPro  6.3  /  Alb  2.9<L>  /  TBili  0.5  /  DBili  x   /  AST  13  /  ALT  10  /  AlkPhos  80  05-05    Creatinine Trend: 2.83<--, 2.76<--, 2.36<--, 2.37<--, 2.26<--, 1.72<--                        8.0    9.22  )-----------( 205      ( 05 May 2024 04:09 )             25.5         TTE W or WO Ultrasound Enhancing Agent (04.25.24 @ 09:27) >  CONCLUSIONS:      1. Left ventricular cavity is moderately dilated. Left ventricular systolic function is severely decreased with an ejection fraction of 25 % by Nunes's method of disks. Global left ventricular hypokinesis.   2. There is no evidence of a left ventricular thrombus.   3. There is moderate (grade 2) left ventricular diastolic dysfunction, withelevated filling pressure.   4. There is calcification of the mitral valve annulus.   5. A Benitez Bakari (TAVR) valve replacement is present in the aortic position The prosthetic valve is well seated. No paravalvular regurgitation.   6. Moderately enlarged right ventricular cavity size, with normal wall thickness, and normal systolic function.   7. The right atrium is moderately dilated.   8. Left pleural effusion noted.   9. No pericardial effusion seen.  10. Compared to the transthoracic echocardiogram performed on 4/24/2024, there have been no significant interval changes.      12 Lead ECG (05.03.24 @ 05:42) >   SINUS RHYTHM WITH 1ST DEGREE A-V BLOCK  LEFT AXIS DEVIATION  LEFT BUNDLE BRANCH BLOCK  ABNORMAL ECG      Xray Chest 1 View- PORTABLE-Routine (Xray Chest 1 View- PORTABLE-Routine in AM.) (05.04.24 @ 05:38) >  FINDINGS/  IMPRESSION:  Left-sided dialysis catheter in the SVC region.  Apical lordotic projection limits evaluation of the bases.  A MICRA implant overlies the left heart border.  HEART:  Enlarged. Patient is status post TAVR.  LUNGS: Mild interstitial edema, left effusion. Similar to prior.  BONES: degenerative changes

## 2024-05-05 NOTE — PROGRESS NOTE ADULT - ASSESSMENT
73F w HFrEF (EF 30%), mod AS, known LBBB, HTN, IDDM1, CKD, hypothyroidism, chronic lymphedema, suspected OHS/LELIA on 3L NC home O2 p/w 1 episode of syncope. Recent admission at [facility] (3/29-4/5) for ADHF and DUNCAN s/p diuresis, discharged with new home O2 3L NC suspecting OHS/LELIA pending outpatient w/u. Since discharge a week ago, she has been staying at home with   Pt had sob walking to car. Once in car, she was still breathing heavily. Partner noticed pt was not responding to verbal stimuli for 10-15sec and witnessed R arm jerking. Pt gained consciousness quickly without postictal symptoms. Pt went to Cardiologist Dr. Nathan who recommended pt to come to ED. No fever, cp, abd pain, n/v. Leg swelling is improved from prior. Pt on torsemide 40mg which she has been taking. Pt was discharged on 3LNC which she is currently on. Patient reports she did not take Farxiga that she was discharged on as she was concerned about side effects.     In ED, patient was found afebrile, hemodynamically stable, breathing well on 3L NC. Initial labs notable for mild hyponatremia, DUNCAN on CKD, elevated proBNP and elevated pCO2 both improved from prior.  pt also noticed with abn creatinine      1- CKD IV  with DUNCAN   2- CHF   3- lymphedema   4- severe AS  s/p tavr 4/24  5- hypotension         suspect ATN from hypotension along with contrast nephropathy   creatinine worsening requiring renal replacement therapy, HD initiated 4/18  fluid status improving   dc'd CRRT   has + uo is non oliguric  cont  bumex drip 2mg hr   strict I/O  pressor support  with vasopressin  and midodrine 20 mg tid   retacrit 60245 U Tiw for anemia   bumex 1 mg hr  may need to lower dose again soon based on bp   d.w CCU team when seen earlier

## 2024-05-05 NOTE — PROGRESS NOTE ADULT - CRITICAL CARE ATTENDING COMMENT
Initially admitted with syncope in the setting of severe AS s/p TAVR   Hospital course has been complicated by VT/VF cardiac arrest, DUNCAN requiring CVVH, heart block requiring PPM and contrast-induced skin sloughing  A+Ox3  Shock requiring Vasopressin in the setting of CVVH, likely due to intravascular depletion   When weaned off urine output drops significantly  Increase Midodrine and wean Vasopressin as able for MAP 60 given wide pulse pressure  TTE with severely reduced LVEF  O2 sats mid 90s on room air  DASH/diabetic diet  DUNCAN, transitioned off CVVH, now being challenged with diuretics prior to any initiation of iHD   Making good urine, no urgent indications for HD currently - continue Bumex drip  H/H low but acceptable on HSQ for DVT prophylaxis   Afebrile, no antibiotics  Sugars controlled on Lantus  Endocrine if following for adrenal nodule and concern for Cushings - f/u recjudi Calixto 4/24   OOB, PT is following Initially admitted with syncope in the setting of severe AS s/p TAVR   Hospital course has been complicated by VT/VF cardiac arrest, DUNCAN requiring CVVH, heart block requiring PPM and contrast-induced skin sloughing  A+Ox3  Shock requiring Vasopressin in the setting of CVVH, likely due to intravascular depletion   When weaned off urine output drops significantly  Pressure also drops on any exertion or change of position   Unclear if orthostatic, low output or if somehow related to adrenal nodule  Increase Midodrine, fluid challenge with colloids and wean Vasopressin as able for MAP 60 given wide pulse pressure  TTE with severely reduced LVEF  O2 sats mid 90s on room air  DASH/diabetic diet  DUNCAN, transitioned off CVVH, now being challenged with diuretics prior to any initiation of iHD   Making good urine, no urgent indications for HD currently - decrease Bumex drip, colloid challenge as above  H/H low but acceptable on HSQ for DVT prophylaxis   Afebrile, no antibiotics  Sugars controlled on Lantus  Endocrine if following for adrenal nodule and concern for Cushings - f/u recs  Marily 4/24   OOB, PT is following

## 2024-05-06 NOTE — PROGRESS NOTE ADULT - SUBJECTIVE AND OBJECTIVE BOX
[facility] -- Shyanne Mueller, Goran, Howard, Carlos Luong Savella  Office # [phone]      Follow Up:  Shock, HF    Subjective/Observations: Patient seen and examined. Events noted. Resting comfortably in bed. On vaso gtt and Bumex gtt No complaints of chest pain, dyspnea, or palpitations reported. No signs of orthopnea or PND.       REVIEW OF SYSTEMS: All other review of systems is negative unless indicated above    PAST MEDICAL & SURGICAL HISTORY:  Hypertension      Diabetes      Lymphedema      H/O left bundle branch block      History of left bundle branch block (LBBB)      Systolic heart failure, chronic      Type 1 diabetes      H/O cataract  2020      History of surgical removal of meniscus of knee  left in 1971      Frozen shoulder  2000      H/O Achilles tendon repair  lengthened bilaterally, 2000      Fractured skull  1968          MEDICATIONS  (STANDING):  ascorbic acid 500 milliGRAM(s) Oral daily  aspirin  chewable 81 milliGRAM(s) Oral daily  atorvastatin 80 milliGRAM(s) Oral at bedtime  buMETAnide Infusion 1 mG/Hr (5 mL/Hr) IV Continuous <Continuous>  calamine/zinc oxide Lotion 1 Application(s) Topical three times a day  chlorhexidine 2% Cloths 1 Application(s) Topical daily  chlorhexidine 4% Liquid 1 Application(s) Topical <User Schedule>  epoetin mendoza-epbx (RETACRIT) Injectable 01600 Unit(s) IV Push <User Schedule>  ferrous    sulfate 325 milliGRAM(s) Oral two times a day  heparin   Injectable 5000 Unit(s) SubCutaneous every 8 hours  insulin glargine Injectable (LANTUS) 32 Unit(s) SubCutaneous at bedtime  insulin lispro (ADMELOG) corrective regimen sliding scale   SubCutaneous Before meals and at bedtime  insulin lispro Injectable (ADMELOG) 8 Unit(s) SubCutaneous three times a day before meals  levothyroxine 75 MICROGram(s) Oral daily  midodrine 20 milliGRAM(s) Oral every 8 hours  multivitamin 1 Tablet(s) Oral daily  mupirocin 2% Ointment 1 Application(s) Topical three times a day  senna 2 Tablet(s) Oral at bedtime  vasopressin Infusion 0.02 Unit(s)/Min (3 mL/Hr) IV Continuous <Continuous>    MEDICATIONS  (PRN):  polyethylene glycol 3350 17 Gram(s) Oral two times a day PRN Constipation      Allergies    contrast media (iodine-based) (Fever; Vomiting; Flushing; Hypotension; Rash; Stomach Upset)  Ativan (Rash; Urticaria)  penicillins (Hives (Mod to Severe); Anaphylaxis (Mod to Severe); Short breath (Mod to Severe); Angioedema (Mod to Severe))    Intolerances            Vital Signs Last 24 Hrs  T(C): 36.9 (06 May 2024 03:00), Max: 37 (05 May 2024 19:00)  T(F): 98.5 (06 May 2024 03:00), Max: 98.6 (05 May 2024 19:00)  HR: 74 (06 May 2024 06:45) (73 - 113)  BP: 123/56 (05 May 2024 18:00) (99/69 - 139/60)  BP(mean): 81 (05 May 2024 18:00) (77 - 89)  RR: 25 (06 May 2024 06:45) (15 - 52)  SpO2: 90% (06 May 2024 06:45) (76% - 99%)    Parameters below as of 06 May 2024 06:00  Patient On (Oxygen Delivery Method): room air        I&O's Summary    05 May 2024 07:01  -  06 May 2024 07:00  --------------------------------------------------------  IN: 1388 mL / OUT: 3340 mL / NET: -1952 mL          PHYSICAL EXAM:  TELE:   Constitutional: NAD, awake    HEENT: Moist Mucous Membranes, Anicteric  Pulmonary: Decreased breath sounds b/l. No rales, crackles or wheeze appreciated.   Cardiovascular: Regular, S1 and S2, 3/6 SM   Gastrointestinal: Bowel Sounds present, soft, nontender.   Lymph: +peripheral edema. No lymphadenopathy.  Skin: LE bandages c/d/i   Psych:  Mood & affect appropriate for situation    LABS: All Labs Reviewed:                        8.1    10.36 )-----------( 252      ( 06 May 2024 01:53 )             25.9                         8.0    9.22  )-----------( 205      ( 05 May 2024 04:09 )             25.5                         7.9    9.37  )-----------( 193      ( 04 May 2024 00:45 )             25.3     06 May 2024 01:53    133    |  93     |  46     ----------------------------<  172    4.0     |  25     |  3.25   05 May 2024 19:20    132    |  93     |  44     ----------------------------<  113    4.1     |  25     |  3.10   05 May 2024 13:50    132    |  95     |  42     ----------------------------<  146    3.7     |  23     |  2.75     Ca    8.6        06 May 2024 01:53  Ca    8.5        05 May 2024 19:20  Ca    8.5        05 May 2024 13:50  Phos  4.7       06 May 2024 01:53  Phos  4.5       05 May 2024 19:20  Phos  4.3       05 May 2024 13:50  Mg     2.2       06 May 2024 01:53  Mg     1.9       05 May 2024 19:20  Mg     2.0       05 May 2024 13:50    TPro  6.5    /  Alb  3.0    /  TBili  0.5    /  DBili  x      /  AST  16     /  ALT  7      /  AlkPhos  78     06 May 2024 01:53  TPro  6.5    /  Alb  3.1    /  TBili  0.4    /  DBili  x      /  AST  17     /  ALT  9      /  AlkPhos  80     05 May 2024 19:20  TPro  6.4    /  Alb  3.2    /  TBili  0.4    /  DBili  x      /  AST  13     /  ALT  9      /  AlkPhos  80     05 May 2024 13:50        - Troponin:

## 2024-05-06 NOTE — PROGRESS NOTE ADULT - ATTENDING COMMENTS
Following this patient for L adrenal nodule and T1D management. In terms of L adrenal nodule, patient had 2 abnormal DST, salivary cortisol canceled by lab, 24 hour urine cortisol wnl. Can follow up outpatient. Pheo and primary cheri negative. For T1D, adjust basal/bolus as above to keep glucose 140-180 while inpatient. Rest of the plan as above.

## 2024-05-06 NOTE — PROGRESS NOTE ADULT - ASSESSMENT
73F w HFrEF (EF 30%), mod AS, known LBBB, HTN, T1DM, CKD, hypothyroidism, chronic lymphedema, suspected OHS/LELIA on 3L NC home O2 p/w 1 episode of syncope with R arm jerking, likely 2/2 severe symptomatic AS. CTA imagings performed for TAVR eval, c/b DUNCAN on CKD. C/b febrile and hypotension, sepsis workup pending. CT c/a/p w/w/o IV contrast revealed incidental finding of L adrenal nodule.    #T1DM  #DKA  Has hx of T1DM since age 26  Endocrinologist: Dr. Luis Dumont  Home regimen: Lantus 33 units qhs, Novolog based on sliding scale TIDAC normally 3-5 units  Has Dexcom G7  A1c is 7.4%  C peptide undetectable <0.1 with glucose 107, confirming insulin deficiency  Transitioned to basal/bolus from insulin gtt  BG slightly elevated this morning  PLAN  - Increase Lantus to 34 units nightly   - c/w Admelog to 8 units TIDAC  - Low dose admelog correction scale TIDAC, Low dose admelog correction scale QHS  - Goal BG in -180  - Of note, patient instructed on discharge from recent hospitalization at [facility] to start farxiga for CHF. Given risks of DKA of SGLT2i in T1DM, would not start until better data is available for its benefits.  - Discharge regimen: home basal/bolus insulin, dose TBD.  - Follow up with Dr. Luis Dumont outpatient    #L adrenal nodule    Incidentally found on CT c/a/p w/wo IV contrast done for TAVR evaluation. The left adrenal gland is thickened and a 1.2 x 0.8 cm left adrenal nodule is seen. The right adrenal gland is normal.   Primary team discussed with radiology. HU of the adrenal nodule not able to be accurately determined due to motion artifact, also given imaging was taken at venous phase.    Patient had another CT scan which showed normal adrenals on 5/3/24    Test for excess adrenal hormones:   - Dex suppression test: AM cortisol 9.4 not suppressed with sufficiently high dexamethasone 374 (4/18/24). Repeat test AM cortisol 4.1 not suppressed with ACTH 9.5, dex level 449 (4/20/24). Concerning for possible Cushings, likely primary adrenal source given ACTH not elevated, however, patient is also in ICU on pressors and in a stressed state. Urine cortisol low at 1.2mcg/24 hour but only 200ml for 24 hours. Salivary cortisol cancelled for QNS, repeat salivary cortisol specimen received   - plasma metanephrines 46.8 normal range    - serum aldosterone is elevated to 37.4. Plasma renin activity 9.775. Ratio = 3.82, not elevated, no hyperaldosteronism.    - DHEAS 139 wnl. Androstenedione <10.    PLAN  - Patient with 2 positive DST - concerning for cushing syndrome. No indication for treatment at this time, would recommend repeating testing outpatient after patient resolved from acute critical illness - will give signout to patient's endocrinologist Dr. Luis Dumont prior to discharge   - Follow up salivary cortisol (specimen received)   - May need to repeat 24 hour urine cortisol once renal function improved and no longer on CRRT   - Will need follow up outpatient with Dr. Luis Dumont     #Hx of Hypothyroidism  TSH slightly elevated to 4.79-5.06 with normal FT4 1.3-1.5  - continue home LT4 75mcg daily  - repeat TFT's outpatient when clinically stabilized    ICU patient requires close monitoring.       Clemente Enamorado MD  Endocrine Fellow  Can be reached via [employer] teams.    For follow up questions, discharge recommendations, or new consults, please email [email or web address] or [email or web address] ([email or web address]) or call answering service at [phone] (weekdays); [phone] (nights/weekends).  For emergiencies please page fellow on call.

## 2024-05-06 NOTE — PROGRESS NOTE ADULT - ASSESSMENT
73F w HFrEF (EF 30%), mod AS, known LBBB, HTN, IDDM1, CKD, hypothyroidism, chronic lymphedema, p/w 1 episode of syncope with R arm jerking.     #Syncope-Aortic Stenosis  - Known Aortic Stenosis likely Severe in setting of decreased LVEF  - s/p tavr on 4/24 c/b VT -> shock -> VFib -> shock  - hypoxic/congested, and was intubated, now extubated on 4/25 in the evening, on NC   - on 4/25 with worsening bradycardia, and now s/p TVP placement followed by AV Micra placement with removal of TVP  - Remains in Sinus Rhythm/known lbbb with intermittent   - cont pressors as needed, currently on Vaso  - Still with labile BP despite Midodrine 20mg Q8  - would try to mobilize as best as possible.   - cont asa and statin    #Chronic Systolic HF (EF 30%), mod to severe AS, HTN, LBBB, Lymphedema   - Has known systolic HF and AS.    - Repeat TTE showed EF 30%, mild-mod LVH, mildly reduced RVF, mod-severe AS (.61 cmsq), mod pHTN  - On home Torsemide 20 mg daily, Eplerenone 25 mg daily, and Toprol  mg daily, all currently on hold  - HD per renal, CVVH on hold this morning  -On Bumex gtt 1mg  - cr is rising and pt with some level of orthostasis. consider holding or tansitioning to IVP  - fu with renal   - very high risk of decompensation

## 2024-05-06 NOTE — PROGRESS NOTE ADULT - SUBJECTIVE AND OBJECTIVE BOX
[facility]   [phone]  RENAL FOLLOW UP NOTE  --------------------------------------------------------------------------------  Chief Complaint:    24 hour events/subjective:    seen earlier   states breathing is better but overall is feeling fatigue  is on vasopressin when seen     PAST HISTORY  --------------------------------------------------------------------------------  No significant changes to PMH, PSH, FHx, SHx, unless otherwise noted    ALLERGIES & MEDICATIONS  --------------------------------------------------------------------------------  Allergies    contrast media (iodine-based) (Fever; Vomiting; Flushing; Hypotension; Rash; Stomach Upset)  Ativan (Rash; Urticaria)  penicillins (Hives (Mod to Severe); Anaphylaxis (Mod to Severe); Short breath (Mod to Severe); Angioedema (Mod to Severe))    Intolerances      Standing Inpatient Medications  ascorbic acid 500 milliGRAM(s) Oral daily  aspirin  chewable 81 milliGRAM(s) Oral daily  atorvastatin 80 milliGRAM(s) Oral at bedtime  calamine/zinc oxide Lotion 1 Application(s) Topical three times a day  chlorhexidine 2% Cloths 1 Application(s) Topical daily  chlorhexidine 4% Liquid 1 Application(s) Topical <User Schedule>  dextrose 50% Injectable 25 Gram(s) IV Push once  dextrose 50% Injectable 12.5 Gram(s) IV Push once  dextrose 50% Injectable 25 Gram(s) IV Push once  dextrose Oral Gel 15 Gram(s) Oral once  epoetin mendoza-epbx (RETACRIT) Injectable 32234 Unit(s) IV Push <User Schedule>  ferrous    sulfate 325 milliGRAM(s) Oral two times a day  glucagon  Injectable 1 milliGRAM(s) IntraMuscular once  heparin   Injectable 5000 Unit(s) SubCutaneous every 8 hours  insulin glargine Injectable (LANTUS) 34 Unit(s) SubCutaneous at bedtime  insulin lispro (ADMELOG) corrective regimen sliding scale   SubCutaneous three times a day before meals  insulin lispro (ADMELOG) corrective regimen sliding scale   SubCutaneous at bedtime  insulin lispro Injectable (ADMELOG) 8 Unit(s) SubCutaneous three times a day before meals  levothyroxine 75 MICROGram(s) Oral daily  midodrine 30 milliGRAM(s) Oral every 8 hours  multivitamin 1 Tablet(s) Oral daily  mupirocin 2% Ointment 1 Application(s) Topical three times a day  senna 2 Tablet(s) Oral at bedtime  vasopressin Infusion 0.02 Unit(s)/Min IV Continuous <Continuous>    PRN Inpatient Medications  polyethylene glycol 3350 17 Gram(s) Oral two times a day PRN      REVIEW OF SYSTEMS  --------------------------------------------------------------------------------    Gen: denies  fevers/chills,  CVS: denies chest pain/palpitations  Resp: denies SOB/Cough  GI: Denies N/V/Abd pain  : Denies dysuria    VITALS/PHYSICAL EXAM  --------------------------------------------------------------------------------  T(C): 36.9 (05-06-24 @ 19:00), Max: 36.9 (05-05-24 @ 23:00)  HR: 89 (05-06-24 @ 20:00) (74 - 113)  BP: --  RR: 21 (05-06-24 @ 20:00) (14 - 31)  SpO2: 97% (05-06-24 @ 20:00) (84% - 99%)  Wt(kg): --        05-05-24 @ 07:01  -  05-06-24 @ 07:00  --------------------------------------------------------  IN: 1394.5 mL / OUT: 3490 mL / NET: -2095.5 mL    05-06-24 @ 07:01  -  05-06-24 @ 20:28  --------------------------------------------------------  IN: 797.5 mL / OUT: 1450 mL / NET: -652.5 mL      Physical Exam:  	    Gen:  on RA, comfortable appearing   	Pulm: decrease bs  no rales or ronchi or wheezing  	CV: No JVD. RRR, S1S2; no rub II/VI M   	Abd: +BS, soft, nontender/nondistended, obese   	UE: Warm, no cyanosis  no clubbing,   +  edema  	LE: Warm, no cyanosis  no clubbing, 2+  softer  edema, B/L   	Neuro: alert and oriented         LABS/STUDIES  --------------------------------------------------------------------------------              8.1    10.36 >-----------<  252      [05-06-24 @ 01:53]              25.9     133  |  93  |  46  ----------------------------<  172      [05-06-24 @ 01:53]  4.0   |  25  |  3.25        Ca     8.6     [05-06-24 @ 01:53]      Mg     2.2     [05-06-24 @ 01:53]      Phos  4.7     [05-06-24 @ 01:53]    TPro  6.5  /  Alb  3.0  /  TBili  0.5  /  DBili  x   /  AST  16  /  ALT  7   /  AlkPhos  78  [05-06-24 @ 01:53]          Creatinine Trend:  SCr 3.25 [05-06 @ 01:53]  SCr 3.10 [05-05 @ 19:20]  SCr 2.75 [05-05 @ 13:50]  SCr 2.83 [05-05 @ 04:09]  SCr 2.76 [05-04 @ 22:11]    PTH -- (Ca 8.4)      [04-19-24 @ 17:54]   109  TSH 5.06      [04-14-24 @ 07:18]  Lipid: chol 74, TG 70, HDL 33, LDL --      [04-13-24 @ 07:05]

## 2024-05-06 NOTE — PROGRESS NOTE ADULT - ATTENDING COMMENTS
Admitted with syncope in the setting of severe aortic stenosis. Now status post TAVR.  Hospital course complicated by cardiac arrest (VT/VF), DUNCAN requiring CVVH with large volume removal, complete heart block, now status post PPM  Further complicated by allergic reaction to IV contrast - skin sloughing  Shock requiring vasopressin - will wean pressor support as tolerated. Transition to midodrine as needed.   Monitor urine output and renal function.  Orthostatic hypotension noted.  Increase Midodrine, fluid challenge with colloids and wean Vasopressin as able for MAP 60 given wide pulse pressure  TTE with severely reduced LVEF  O2 sats mid 90s on room air  Endocrine consult appreciated.

## 2024-05-06 NOTE — PROGRESS NOTE ADULT - CRITICAL CARE ATTENDING COMMENT
Upon my evaluation, this patient is at high risk for imminent or life threatening deterioration due to shock  and other active medical issues which require my direct attention, intervention, and personal management.  I have personally spent >30 minutes  of critical care time exclusive of time spent on separate billing procedures. This includes review of laboratory data, radiology results, discussion with primary team\patient, and monitoring for potential decompensation. Interventions were performed as documented above.

## 2024-05-06 NOTE — PROGRESS NOTE ADULT - SUBJECTIVE AND OBJECTIVE BOX
Clemente Enamorado MD, PGY-4  Endocrinology fellow  Available on [employer] Teams    Interval Events: No acute overnight events. Pt seen and examined. Tolerating PO, no nausea/vomitting. No hypoglycemia symptoms. Denies fevers, chills, CP, SOB, Abdominal pain, constipation, Diarrhea.      MEDICATIONS  (STANDING):  ascorbic acid 500 milliGRAM(s) Oral daily  aspirin  chewable 81 milliGRAM(s) Oral daily  atorvastatin 80 milliGRAM(s) Oral at bedtime  calamine/zinc oxide Lotion 1 Application(s) Topical three times a day  chlorhexidine 2% Cloths 1 Application(s) Topical daily  chlorhexidine 4% Liquid 1 Application(s) Topical <User Schedule>  dextrose 50% Injectable 25 Gram(s) IV Push once  dextrose 50% Injectable 12.5 Gram(s) IV Push once  dextrose 50% Injectable 25 Gram(s) IV Push once  dextrose Oral Gel 15 Gram(s) Oral once  epoetin mendoza-epbx (RETACRIT) Injectable 49859 Unit(s) IV Push <User Schedule>  ferrous    sulfate 325 milliGRAM(s) Oral two times a day  glucagon  Injectable 1 milliGRAM(s) IntraMuscular once  heparin   Injectable 5000 Unit(s) SubCutaneous every 8 hours  insulin glargine Injectable (LANTUS) 34 Unit(s) SubCutaneous at bedtime  insulin lispro (ADMELOG) corrective regimen sliding scale   SubCutaneous at bedtime  insulin lispro (ADMELOG) corrective regimen sliding scale   SubCutaneous three times a day before meals  insulin lispro Injectable (ADMELOG) 8 Unit(s) SubCutaneous three times a day before meals  levothyroxine 75 MICROGram(s) Oral daily  midodrine 30 milliGRAM(s) Oral every 8 hours  multivitamin 1 Tablet(s) Oral daily  mupirocin 2% Ointment 1 Application(s) Topical three times a day  senna 2 Tablet(s) Oral at bedtime  vasopressin Infusion 0.02 Unit(s)/Min (3 mL/Hr) IV Continuous <Continuous>    MEDICATIONS  (PRN):  polyethylene glycol 3350 17 Gram(s) Oral two times a day PRN Constipation      Allergies    contrast media (iodine-based) (Fever; Vomiting; Flushing; Hypotension; Rash; Stomach Upset)  Ativan (Rash; Urticaria)  penicillins (Hives (Mod to Severe); Anaphylaxis (Mod to Severe); Short breath (Mod to Severe); Angioedema (Mod to Severe))    Intolerances          ================PHYSICAL EXAM======================  VITALS: T(C): 36.9 (05-06-24 @ 14:00)  T(F): 98.4 (05-06-24 @ 14:00), Max: 98.6 (05-05-24 @ 19:00)  HR: 80 (05-06-24 @ 15:00) (74 - 103)  BP: 123/56 (05-05-24 @ 18:00) (111/58 - 123/56)  RR:  (15 - 31)  SpO2:  (84% - 99%)  Wt(kg): --  GENERAL: NAD, appears comfortable  EYES: No proptosis, no lid lag, anicteric  HEENT:  Atraumatic, Normocephalic, moist mucous membranes  RESPIRATORY: nonlabored respirations, no wheezing  PSYCH: Alert and awake  ===================================================    CAPILLARY BLOOD GLUCOSE      POCT Blood Glucose.: 124 mg/dL (06 May 2024 12:42)  POCT Blood Glucose.: 208 mg/dL (06 May 2024 07:57)  POCT Blood Glucose.: 151 mg/dL (05 May 2024 21:21)  POCT Blood Glucose.: 125 mg/dL (05 May 2024 16:34)      05-06    133<L>  |  93<L>  |  46<H>  ----------------------------<  172<H>  4.0   |  25  |  3.25<H>    eGFR: 15<L>    Ca    8.6      05-06  Mg     2.2     05-06  Phos  4.7     05-06    TPro  6.5  /  Alb  3.0<L>  /  TBili  0.5  /  DBili  x   /  AST  16  /  ALT  7<L>  /  AlkPhos  78  05-06      A1C with Estimated Average Glucose Result: 7.4 % (03-30-24 @ 08:21)  A1C with Estimated Average Glucose Result: 6.8 % (01-10-24 @ 08:37)  A1C with Estimated Average Glucose Result: 7.3 % (08-12-23 @ 07:58)  A1C with Estimated Average Glucose Result: 8.2 % (06-05-23 @ 06:10)      Thyroid Function Tests:  04-14 @ 07:18 TSH 5.06 FreeT4 1.3 T3 -- Anti TPO -- Anti Thyroglobulin Ab -- TSI --  04-13 @ 07:05 TSH 4.79 FreeT4 1.5 T3 -- Anti TPO -- Anti Thyroglobulin Ab -- TSI --

## 2024-05-06 NOTE — PROGRESS NOTE ADULT - ASSESSMENT
73F w HFrEF (EF 30%), mod AS, known LBBB, HTN, IDDM1, CKD, hypothyroidism, chronic lymphedema, suspected OHS/LELIA on 3L NC home O2 p/w 1 episode of syncope. Recent admission at [facility] (3/29-4/5) for ADHF and DUNCAN s/p diuresis, discharged with new home O2 3L NC suspecting OHS/LELIA pending outpatient w/u. Since discharge a week ago, she has been staying at home with   Pt had sob walking to car. Once in car, she was still breathing heavily. Partner noticed pt was not responding to verbal stimuli for 10-15sec and witnessed R arm jerking. Pt gained consciousness quickly without postictal symptoms. Pt went to Cardiologist Dr. Nathan who recommended pt to come to ED. No fever, cp, abd pain, n/v. Leg swelling is improved from prior. Pt on torsemide 40mg which she has been taking. Pt was discharged on 3LNC which she is currently on. Patient reports she did not take Farxiga that she was discharged on as she was concerned about side effects.     In ED, patient was found afebrile, hemodynamically stable, breathing well on 3L NC. Initial labs notable for mild hyponatremia, DUNCAN on CKD, elevated proBNP and elevated pCO2 both improved from prior.  pt also noticed with abn creatinine      1- CKD IV  with DUNCAN   2- CHF   3- lymphedema   4- severe AS  s/p tavr 4/24  5- hypotension         suspect ATN from hypotension along with contrast nephropathy   creatinine worsening requiring renal replacement therapy, HD initiated 4/18  fluid status improving   dc'd CRRT   has + uo is non oliguric   bp is low overall   dc bumex drip   strict I/O  pressor support  with tapering  vasopressin as tolerated   and raise midodrine to  30 mg tid   retacrit [id number] U Tiw for anemia

## 2024-05-06 NOTE — PROGRESS NOTE ADULT - SUBJECTIVE AND OBJECTIVE BOX
EVELYN LOPEZ  MRN-[id number]  Patient is a 73y old  Female who presents with a chief complaint of Syncope (06 May 2024 16:03)    HPI:  73F w HFrEF (EF 30%), mod AS, known LBBB, HTN, IDDM1, CKD, hypothyroidism, chronic lymphedema, suspected OHS/LELIA on 3L NC home O2 p/w 1 episode of syncope. Recent admission at [facility] (3/29-4/5) for ADHF and DUNCAN s/p diuresis, discharged with new home O2 3L NC suspecting OHS/LELIA pending outpatient w/u. Since discharge a week ago, she has been staying at home with   Pt had sob walking to car. Once in car, she was still breathing heavily. Partner noticed pt was not responding to verbal stimuli for 10-15sec and witnessed R arm jerking. Pt gained consciousness quickly without postictal symptoms. Pt went to Cardiologist Dr. Nathan who recommended pt to come to ED. No fever, cp, abd pain, n/v. Leg swelling is improved from prior. Pt on torsemide 40mg which she has been taking. Pt was discharged on 3LNC which she is currently on. Patient reports she did not take Farxiga that she was discharged on as she was concerned about side effects.     In ED, patient was found afebrile, hemodynamically stable, breathing well on 3L NC. Initial labs notable for mild hyponatremia, DUNCAN on CKD, elevated proBNP and elevated pCO2 both improved from prior. (12 Apr 2024 16:31)      24 HOUR EVENTS:    REVIEW OF SYSTEMS:    CONSTITUTIONAL: No weakness, fevers or chills  EYES/ENT: No visual changes;  No vertigo or throat pain   NECK: No pain or stiffness  RESPIRATORY: No cough, wheezing, hemoptysis; No shortness of breath  CARDIOVASCULAR: No chest pain or palpitations  GASTROINTESTINAL: No abdominal or epigastric pain. No nausea, vomiting, or hematemesis; No diarrhea or constipation. No melena or hematochezia.  GENITOURINARY: No dysuria, frequency or hematuria  NEUROLOGICAL: No numbness or weakness  SKIN: No itching, rashes      ICU Vital Signs Last 24 Hrs  T(C): 36.9 (06 May 2024 14:00), Max: 37 (05 May 2024 19:00)  T(F): 98.4 (06 May 2024 14:00), Max: 98.6 (05 May 2024 19:00)  HR: 84 (06 May 2024 18:30) (74 - 113)  BP: --  BP(mean): --  ABP: 131/48 (06 May 2024 18:30) (82/35 - 235/174)  ABP(mean): 75 (06 May 2024 18:30) (50 - 208)  RR: 21 (06 May 2024 18:30) (14 - 31)  SpO2: 96% (06 May 2024 18:30) (84% - 99%)    O2 Parameters below as of 06 May 2024 18:08  Patient On (Oxygen Delivery Method): room air            CVP(mm Hg): --  CO: --  CI: --  PA: --  PA(mean): --  PA(direct): --  PCWP: --  LA: --  RA: --  SVR: --  SVRI: --  PVR: --  PVRI: --  I&O's Summary    05 May 2024 07:01  -  06 May 2024 07:00  --------------------------------------------------------  IN: 1394.5 mL / OUT: 3490 mL / NET: -2095.5 mL    06 May 2024 07:01  -  06 May 2024 18:37  --------------------------------------------------------  IN: 797.5 mL / OUT: 1200 mL / NET: -402.5 mL        CAPILLARY BLOOD GLUCOSE    CAPILLARY BLOOD GLUCOSE      POCT Blood Glucose.: 92 mg/dL (06 May 2024 16:37)      PHYSICAL EXAM:  GENERAL: No acute distress, well-developed  HEAD:  Atraumatic, Normocephalic  EYES: EOMI, PERRLA, conjunctiva and sclera clear  NECK: Supple, no lymphadenopathy, no JVD  CHEST/LUNG: CTAB; No wheezes, rales, or rhonchi  HEART: Regular rate and rhythm. Normal S1/S2. No murmurs, rubs, or gallops  ABDOMEN: Soft, non-tender, non-distended; normal bowel sounds, no organomegaly  EXTREMITIES:  2+ peripheral pulses b/l, No clubbing, cyanosis, or edema  NEUROLOGY: A&O x 3, no focal deficits  SKIN: No rashes or lesions    ============================I/O===========================   I&O's Detail    05 May 2024 07:01  -  06 May 2024 07:00  --------------------------------------------------------  IN:    Bumetanide: 25 mL    Bumetanide: 105 mL    IV PiggyBack: 250 mL    Oral Fluid: 980 mL    Vasopressin: 34.5 mL  Total IN: 1394.5 mL    OUT:    Indwelling Catheter - Urethral (mL): 3490 mL  Total OUT: 3490 mL    Total NET: -2095.5 mL      06 May 2024 07:01  -  06 May 2024 18:37  --------------------------------------------------------  IN:    Bumetanide: 30 mL    Oral Fluid: 760 mL    Vasopressin: 7.5 mL  Total IN: 797.5 mL    OUT:    Indwelling Catheter - Urethral (mL): 1200 mL  Total OUT: 1200 mL    Total NET: -402.5 mL        ============================ LABS =========================                        8.1    10.36 )-----------( 252      ( 06 May 2024 01:53 )             25.9     05-06    133<L>  |  93<L>  |  46<H>  ----------------------------<  172<H>  4.0   |  25  |  3.25<H>    Ca    8.6      06 May 2024 01:53  Phos  4.7     05-06  Mg     2.2     05-06    TPro  6.5  /  Alb  3.0<L>  /  TBili  0.5  /  DBili  x   /  AST  16  /  ALT  7<L>  /  AlkPhos  78  05-06                LIVER FUNCTIONS - ( 06 May 2024 01:53 )  Alb: 3.0 g/dL / Pro: 6.5 g/dL / ALK PHOS: 78 U/L / ALT: 7 U/L / AST: 16 U/L / GGT: x             ABG - ( 06 May 2024 01:47 )  pH, Arterial: 7.39  pH, Blood: x     /  pCO2: 46    /  pO2: 66    / HCO3: 28    / Base Excess: 2.5   /  SaO2: 94.3              Blood Gas Arterial, Lactate: 1.1 mmol/L (05-06-24 @ 01:47)  Blood Gas Arterial, Lactate: 1.4 mmol/L (05-05-24 @ 13:44)  Blood Gas Arterial, Lactate: 0.9 mmol/L (05-05-24 @ 04:00)  Blood Gas Arterial, Lactate: 1.3 mmol/L (05-04-24 @ 00:40)    Urinalysis Basic - ( 06 May 2024 01:53 )    Color: x / Appearance: x / SG: x / pH: x  Gluc: 172 mg/dL / Ketone: x  / Bili: x / Urobili: x   Blood: x / Protein: x / Nitrite: x   Leuk Esterase: x / RBC: x / WBC x   Sq Epi: x / Non Sq Epi: x / Bacteria: x      ======================Micro/Rad/Cardio=================  Telemetry: Reviewed   EKG: Reviewed  CXR: Reviewed  Culture: Reviewed   Echo:   Cath:   ======================================================  PAST MEDICAL & SURGICAL HISTORY:  Hypertension      Diabetes      Lymphedema      H/O left bundle branch block      History of left bundle branch block (LBBB)      Systolic heart failure, chronic      Type 1 diabetes      H/O cataract  2020      History of surgical removal of meniscus of knee  left in 1971      Frozen shoulder  2000      H/O Achilles tendon repair  lengthened bilaterally, 2000      Fractured skull  1968              ======================= LINES/TUBES  =====================  [place]: (Date placed)  Central Line: (Date placed)  HD Catheter: (Date placed)  Arterial Line: (Date placed)  Endotracheal Tube: (Date placed)  [place]: (Date placed)  ======================= DISPOSITION  =====================  [X] Critical   [ ] Guarded    [ ] Stable    [X] Maintain in CICU  [ ] Downgrade to Telemtry  [ ] Discharge Home    Patient requires continuous monitoring with bedside rhythm monitoring, pulse ox monitoring, and intermittent blood gas analysis. Care plan discussed with ICU care team. Patient remained critical and at risk for life threatening decompensation.  Patient seen, examined and plan discussed with CCU team during rounds.     I have personally provided 30 minutes of critical care time excluding time spent on separate procedures, in addition to initial critical care time provided by the CICU Attending, Dr. Sandoval/ Darek/ Yassine/ Brendon/ Tequila/ Mery .       Becka Horn [facility] [phone]      EVELYN LOPEZ  MRN-[id number]  Patient is a 73y old  Female who presents with a chief complaint of Syncope (06 May 2024 16:03)    HPI: 73F w HFrEF (EF 30%), mod AS, known LBBB, HTN, IDDM1, CKD, hypothyroidism, chronic lymphedema, suspected OHS/LELIA on 3L NC home O2 p/w 1 episode of syncope. Recent admission at [facility] (3/29-4/5) for ADHF and DUNCAN s/p diuresis, discharged with new home O2 3L NC suspecting OHS/LELIA pending outpatient w/u. Since discharge a week ago, she has been staying at home with   Pt had sob walking to car. Once in car, she was still breathing heavily. Partner noticed pt was not responding to verbal stimuli for 10-15sec and witnessed R arm jerking. Pt gained consciousness quickly without postictal symptoms. Pt went to Cardiologist Dr. Nathan who recommended pt to come to ED. No fever, cp, abd pain, n/v. Leg swelling is improved from prior. Pt on torsemide 40mg which she has been taking. Pt was discharged on 3LNC which she is currently on. Patient reports she did not take Farxiga that she was discharged on as she was concerned about side effects.     In ED, patient was found afebrile, hemodynamically stable, breathing well on 3L NC. Initial labs notable for mild hyponatremia, DUNCAN on CKD, elevated proBNP and elevated pCO2 both improved from prior. (12 Apr 2024 16:31)    REVIEW OF SYSTEMS:  CONSTITUTIONAL: No weakness, fevers or chills  EYES/ENT: No visual changes;  No vertigo or throat pain   NECK: No pain or stiffness  RESPIRATORY: No cough, wheezing, hemoptysis; No shortness of breath  CARDIOVASCULAR: No chest pain or palpitations  GASTROINTESTINAL: No abdominal or epigastric pain  No nausea, vomiting, or hematemesis; No diarrhea or constipation. No melena or hematochezia.  GENITOURINARY: No dysuria, frequency or hematuria  NEUROLOGICAL: No numbness or weakness  SKIN: No itching, rashes      ICU Vital Signs Last 24 Hrs  T(C): 36.9 (06 May 2024 14:00), Max: 37 (05 May 2024 19:00)  T(F): 98.4 (06 May 2024 14:00), Max: 98.6 (05 May 2024 19:00)  HR: 84 (06 May 2024 18:30) (74 - 113)  ABP: 131/48 (06 May 2024 18:30) (82/35 - 235/174)  ABP(mean): 75 (06 May 2024 18:30) (50 - 208)  RR: 21 (06 May 2024 18:30) (14 - 31)  SpO2: 96% (06 May 2024 18:30) (84% - 99%)    O2 Parameters below as of 06 May 2024 18:08  Patient On (Oxygen Delivery Method): room air        I&O's Summary    05 May 2024 07:01  -  06 May 2024 07:00  --------------------------------------------------------  IN: 1394.5 mL / OUT: 3490 mL / NET: -2095.5 mL  ============================I/O===========================   I&O's Detail    05 May 2024 07:01  -  06 May 2024 07:00  --------------------------------------------------------  IN:    Bumetanide: 25 mL    Bumetanide: 105 mL    IV PiggyBack: 250 mL    Oral Fluid: 980 mL    Vasopressin: 34.5 mL  Total IN: 1394.5 mL    OUT:    Indwelling Catheter - Urethral (mL): 3490 mL  Total OUT: 3490 mL    Total NET: -2095.5 mL      06 May 2024 07:01  -  06 May 2024 18:37  --------------------------------------------------------  IN:    Bumetanide: 30 mL    Oral Fluid: 760 mL    Vasopressin: 7.5 mL  Total IN: 797.5 mL    OUT:    Indwelling Catheter - Urethral (mL): 1200 mL  Total OUT: 1200 mL    Total NET: -402.5 mL  ============================ LABS =========================                   8.1    10.36 )-----------( 252      ( 06 May 2024 01:53 )             25.9     05-06    133<L>  |  93<L>  |  46<H>  ----------------------------<  172<H>  4.0   |  25  |  3.25<H>    Ca    8.6      06 May 2024 01:53  Phos  4.7     05-06  Mg     2.2     05-06    TPro  6.5  /  Alb  3.0<L>  /  TBili  0.5  /  DBili  x   /  AST  16  /  ALT  7<L>  /  AlkPhos  78  05-06    LIVER FUNCTIONS - ( 06 May 2024 01:53 )  Alb: 3.0 g/dL / Pro: 6.5 g/dL / ALK PHOS: 78 U/L / ALT: 7 U/L / AST: 16 U/L / GGT: x           ABG - ( 06 May 2024 01:47 )  pH, Arterial: 7.39  pH, Blood: x     /  pCO2: 46    /  pO2: 66    / HCO3: 28    / Base Excess: 2.5   /  SaO2: 94.3      Blood Gas Arterial, Lactate: 1.1 mmol/L (05-06-24 @ 01:47)  Blood Gas Arterial, Lactate: 1.4 mmol/L (05-05-24 @ 13:44)  Blood Gas Arterial, Lactate: 0.9 mmol/L (05-05-24 @ 04:00)  Blood Gas Arterial, Lactate: 1.3 mmol/L (05-04-24 @ 00:40)    Urinalysis Basic - ( 06 May 2024 01:53 )    Color: x / Appearance: x / SG: x / pH: x  Gluc: 172 mg/dL / Ketone: x  / Bili: x / Urobili: x   Blood: x / Protein: x / Nitrite: x   Leuk Esterase: x / RBC: x / WBC x   Sq Epi: x / Non Sq Epi: x / Bacteria: x  ======================Micro/Rad/Cardio=================  Telemetry: Reviewed   EKG: Reviewed  CXR: Reviewed  Culture: Reviewed   Echo: Reviewed   Cath: Reviewed   ======================================================  PAST MEDICAL & SURGICAL HISTORY:  Hypertension      Diabetes      Lymphedema      H/O left bundle branch block      History of left bundle branch block (LBBB)      Systolic heart failure, chronic      Type 1 diabetes      H/O cataract  2020      History of surgical removal of meniscus of knee  left in 1971      Frozen shoulder  2000      H/O Achilles tendon repair  lengthened bilaterally, 2000      Fractured skull  1968    A/P: 73F PMH: HFrEF (EF 30%), AS, LBBB, HTN, DM1, CKD, hypothyroidism, chronic lymphedema, LELIA (3L home O2) p/w for syncope 2/2 severe AS, s/p TAVR 4/24. Course complicated by contrast induced allergic reaction (had CTA for TAVR eval) and contrast related renal failure (acute on chronic) requiring HD. TAVR c/b VT and vfib requiring shock and flash pulmonary edema causing AHRF requiring intubation. Currently extubated,  on CVVPHD for fluid removal, and on pressor support for vasoplegia and hypovolemia due to CRRT.    NEURO:  - Extubated 4/26  - Currently AOx4  - OOBTC, c/w PT as tolerated  - PT recommends HONEY for eventual dispo    PULM  # AHRF 2/2 flash pulmonary edema  # LELIA (on 3L home oxygen)  - Extubated 4/26  - 4/27 CXR - no consolidation or pleural effusion    CV  #Shock, Vasoplegic  -  vasoplegia from sedation vs losing fluid from CVVHD vs distributive septic shock  - on vasopressin 0.02, maintain for renal perfusion as UO significantly drops when Vaso turned off despite holding BP  - endo ruling out Cushing's   - Midodrine 20 TID     #VT/vfib s/p shock  #2:1 AV block  #Severe AS s/p TAVR  - Known LBBB with first degree AV block on telemetry  - s/p Micra 4/25, no longer requiring TVP (eventual candidate for CRT per EP)  - changes noted on telemetry requiring EP f/u and possible interrogation of device    # HFrEF  - EF 25%  - Holding GDMT while on pressors    /RENAL  #ESRD  - i/s/o ATN and contrast nephropathy  - c/w pressor support   - Strict I/Os  - Trend BMP, lytes  - Avoid nephrotoxins  - Nephro following- pt still remains fluid overloaded, start IHD likely tomorrow  - start midodrine support 10 mg q8h  - c/w bumex drip 2mg/hr and monitor for UOP  - monitor daily for initiation of HD eventually    GI  - tolerating  diet    ENDO  # DM1  - on  basal/bolus lantus 32 QHS, admelog dec to 8u TIDAC   - monitor POCT FS, goal 140-180    # Hypothyroidism  - c/w home levothyroxine 75mcg    #Cushing  # L adrenal nodule  -dexamethasone suppression test w/ am cortisol x2 completed, pt is pos for Cushing's disease, f/u outpt, recheck once critical illness resolves  -f/u [place] salivary cortisol  - CT A/P demonstrating normal adrenals    SKIN  # Acute generalized exanthematous pustulosis   - c/w calamine,  Vaseline    # Psoriasis   # Lymphedema   # R. Diabetic foot ulcer  - Elevate legs  - Compression stockings, ACE wrapping  - Podiatry following- foot wounds management, decubiti precautions f/u outpt, check for iodine gel allergy    ID  #leukocytosis  - afebrile, bcx neg  - Monitor WBC and for fevers, and signs of infection    # Oral Candidiasis  - c/w nystatin     Heme  #Anemia, worsening  -AARTI vs AOCD vs anemia 2/2 blood loss vs myelosuppression iso critical illness  -trend H&H, transfuse PRN    #thrombocytopenia  -  myelosuppression iso critical illness  - no active signs of bleeding, monitor plts, transfuse PRN    ======================= DISPOSITION  =====================  [X] Critical   [ ] Guarded    [ ] Stable    [X] Maintain in CICU  [ ] Downgrade to Telemetry  [ ] Discharge Home    Patient requires continuous monitoring with bedside rhythm monitoring, pulse ox monitoring, and intermittent blood gas analysis. Care plan discussed with ICU care team. Patient remained critical and at risk for life threatening decompensation.  Patient seen, examined and plan discussed with CCU team during rounds.     I have personally provided 30 minutes of critical care time excluding time spent on separate procedures, in addition to initial critical care time provided by the CICU Attending, Dr. [facility] [phone]

## 2024-05-06 NOTE — PROGRESS NOTE ADULT - SUBJECTIVE AND OBJECTIVE BOX
Subjective  No acute events reported overnight.  Patient denies any cardiopulmonary complaint at rest.  Denies any fevers, palpitations.  Fatigue is at her baseline.  Currently off of vasopressors.  Unable to work with physical therapy.  Happy with the amount of fluid that has been removed.    Review of systems  14 point review of system is unremarkable except what described above in the history of present illness     Physical examination  No apparent distress, alert and oriented x 3, appropriate affect  JVD unable to be assessed, no lymphadenopathy  Clear to auscultation bilaterally with no wheezing rhonchi or crackles  Regular rate and rhythm with no murmur rub or gallop  Positive bowel sounds, soft, obese, nontender, nondistended, no masses guarding or rebound  No clubbing cyanosis  2+ edema bilaterally  Moving all extremities spontaneously  2+ PT pulses  No focal deficits    Assessment/plan  Severe aortic valve stenosis status post TAVR on 4/24/2024  Left bundle branch block status post Mobitz type II and Micra placement on 4/25/2024  Stage IV chronic kidney disease with acute DUNCAN status post CVVH/dialysis  Acute on chronic diastolic/systolic heart failure    -- Patient has been uptitrated on midodrine to 30 mg 3 times daily.  Trying to wean off vasopressin.  --Improvement in fluid status.  CRRT has been discontinued.  Noted to have urinary output.  She is nonoliguric.  Bumex drip has been discontinued.  --Due to low blood pressure unable to start on goal-directed medical therapy.  -- Based upon how patient clinically does will need to be assessed by EP in the future for CRT D device.  --Out of bed to chair as tolerated.  Patient would likely benefit to going to a subacute/acute rehabilitation facility.  -- Continue telemetry monitoring.    All questions and concerns of the patient were addressed.    Findings and plan were discussed with CICU/Dr. Olivas.

## 2024-05-07 NOTE — PROGRESS NOTE ADULT - SUBJECTIVE AND OBJECTIVE BOX
PATIENT: EVELYN LOPEZ, MRN: [id number]    CHIEF COMPLAINT: Patient is a 73y old  Female who presents with a chief complaint of Syncope (07 May 2024 09:31)      INTERVAL HISTORY/OVERNIGHT EVENTS: No overnight events. At bedside, patient has been sleeping and eating well. Denies N/V/D/C. Last BM x1 regular yesterday. Denies abdominal pain. Denies chest pain or SOB, cough. Has been ambulating with assistance. Oriented to person, place, and time. Breathing comfortably on room air.    REVIEW OF SYSTEMS:    Constitutional:     [ ] negative [ ] fevers [ ] chills [ ] weight loss [ ] weight gain  HEENT:                  [ ] negative [ ] dry eyes [ ] eye irritation [ ] postnasal drip [ ] nasal congestion  CV:                         [ ] negative  [ ] chest pain [ ] orthopnea [ ] palpitations [ ] murmur  Resp:                     [ ] negative [ ] cough [ ] shortness of breath [ ] dyspnea [ ] wheezing [ ] sputum [ ] hemoptysis  GI:                          [ ] negative [ ] nausea [ ] vomiting [ ] diarrhea [ ] constipation [ ] abd pain [ ] dysphagia   :                        [ ] negative [ ] dysuria [ ] nocturia [ ] hematuria [ ] increased urinary frequency  Musculoskeletal: [ ] negative [ ] back pain [ ] myalgias [ ] arthralgias [ ] fracture  Skin:                       [ ] negative [ ] rash [ ] itch  Neurological:        [ ] negative [ ] headache [ ] dizziness [ ] syncope [ ] weakness [ ] numbness  Psychiatric:           [ ] negative [ ] anxiety [ ] depression  Endocrine:            [ ] negative [ ] diabetes [ ] thyroid problem  Heme/Lymph:      [ ] negative [ ] anemia [ ] bleeding problem  Allergic/Immune: [ ] negative [ ] itchy eyes [ ] nasal discharge [ ] hives [ ] angioedema    [ ] All other systems negative  [ ] Unable to assess ROS because ________.    MEDICATIONS:  MEDICATIONS  (STANDING):  ascorbic acid 500 milliGRAM(s) Oral daily  aspirin  chewable 81 milliGRAM(s) Oral daily  atorvastatin 80 milliGRAM(s) Oral at bedtime  calamine/zinc oxide Lotion 1 Application(s) Topical three times a day  chlorhexidine 2% Cloths 1 Application(s) Topical daily  chlorhexidine 4% Liquid 1 Application(s) Topical <User Schedule>  dextrose 50% Injectable 25 Gram(s) IV Push once  dextrose 50% Injectable 12.5 Gram(s) IV Push once  dextrose 50% Injectable 25 Gram(s) IV Push once  dextrose Oral Gel 15 Gram(s) Oral once  epoetin mendoza-epbx (RETACRIT) Injectable 46374 Unit(s) IV Push <User Schedule>  ferrous    sulfate 325 milliGRAM(s) Oral two times a day  heparin   Injectable 5000 Unit(s) SubCutaneous every 8 hours  insulin glargine Injectable (LANTUS) 34 Unit(s) SubCutaneous at bedtime  insulin lispro (ADMELOG) corrective regimen sliding scale   SubCutaneous three times a day before meals  insulin lispro (ADMELOG) corrective regimen sliding scale   SubCutaneous at bedtime  insulin lispro Injectable (ADMELOG) 8 Unit(s) SubCutaneous three times a day before meals  levothyroxine 75 MICROGram(s) Oral daily  midodrine 30 milliGRAM(s) Oral every 8 hours  multivitamin 1 Tablet(s) Oral daily  mupirocin 2% Ointment 1 Application(s) Topical three times a day  senna 2 Tablet(s) Oral at bedtime    MEDICATIONS  (PRN):  polyethylene glycol 3350 17 Gram(s) Oral two times a day PRN Constipation      ALLERGIES: Allergies    contrast media (iodine-based) (Fever; Vomiting; Flushing; Hypotension; Rash; Stomach Upset)  Ativan (Rash; Urticaria)  penicillins (Hives (Mod to Severe); Anaphylaxis (Mod to Severe); Short breath (Mod to Severe); Angioedema (Mod to Severe))    Intolerances        OBJECTIVE:  ICU Vital Signs Last 24 Hrs  T(C): 36.9 (07 May 2024 11:00), Max: 37.7 (07 May 2024 08:00)  T(F): 98.5 (07 May 2024 11:00), Max: 99.9 (07 May 2024 08:00)  HR: 72 (07 May 2024 11:00) (68 - 113)  BP: --  BP(mean): --  ABP: 122/37 (07 May 2024 11:00) (89/40 - 138/57)  ABP(mean): 65 (07 May 2024 11:00) (56 - 84)  RR: 21 (07 May 2024 11:00) (14 - 33)  SpO2: 93% (07 May 2024 11:00) (89% - 100%)    O2 Parameters below as of 07 May 2024 11:00  Patient On (Oxygen Delivery Method): room air            Adult Advanced Hemodynamics Last 24 Hrs  CVP(mm Hg): --  CVP(cm H2O): --  CO: --  CI: --  PA: --  PA(mean): --  PCWP: --  SVR: --  SVRI: --  PVR: --  PVRI: --  CAPILLARY BLOOD GLUCOSE      POCT Blood Glucose.: 187 mg/dL (07 May 2024 07:37)  POCT Blood Glucose.: 140 mg/dL (06 May 2024 22:24)  POCT Blood Glucose.: 92 mg/dL (06 May 2024 16:37)  POCT Blood Glucose.: 124 mg/dL (06 May 2024 12:42)    CAPILLARY BLOOD GLUCOSE      POCT Blood Glucose.: 187 mg/dL (07 May 2024 07:37)    I&O's Summary    06 May 2024 07:01  -  07 May 2024 07:00  --------------------------------------------------------  IN: 797.5 mL / OUT: 2275 mL / NET: -1477.5 mL    07 May 2024 07:01  -  07 May 2024 11:51  --------------------------------------------------------  IN: 365 mL / OUT: 380 mL / NET: -15 mL      Daily     Daily Weight in k.7 (07 May 2024 00:00)    PHYSICAL EXAMINATION:  General: Comfortable, no acute distress, cooperative with exam.  HEENT: PERRLA, EOMI, moist mucous membranes.  Respiratory: CTAB, normal respiratory effort, no coughing, wheezes, crackles, or rales.  CV: RRR, S1S2, no murmurs, rubs or gallops. No JVD. Distal pulses intact.  Abdominal: Soft, nontender, nondistended, no rebound or guarding, normal bowel sounds.  Neurology: AOx3, no focal neuro defects, PEREZ x 4.  Extremities: No pitting edema, + Peripheral pulses.  Incisions:   Tubes:    LABS:  ABG - ( 07 May 2024 00:20 )  pH, Arterial: 7.38  pH, Blood: x     /  pCO2: 46    /  pO2: 68    / HCO3: 27    / Base Excess: 1.8   /  SaO2: 94.4                                    8.5    12.22 )-----------( 274      ( 07 May 2024 00:32 )             26.7     05-07    131<L>  |  90<L>  |  56<H>  ----------------------------<  202<H>  3.7   |  23  |  3.02<H>    Ca    8.9      07 May 2024 00:32  Phos  4.7     05-07  Mg     2.0     05-07    TPro  6.4  /  Alb  3.1<L>  /  TBili  0.6  /  DBili  x   /  AST  19  /  ALT  11  /  AlkPhos  76  05-07    LIVER FUNCTIONS - ( 07 May 2024 00:32 )  Alb: 3.1 g/dL / Pro: 6.4 g/dL / ALK PHOS: 76 U/L / ALT: 11 U/L / AST: 19 U/L / GGT: x                   Urinalysis Basic - ( 07 May 2024 00:32 )    Color: x / Appearance: x / SG: x / pH: x  Gluc: 202 mg/dL / Ketone: x  / Bili: x / Urobili: x   Blood: x / Protein: x / Nitrite: x   Leuk Esterase: x / RBC: x / WBC x   Sq Epi: x / Non Sq Epi: x / Bacteria: x        TELEMETRY:     EKG:     IMAGING:

## 2024-05-07 NOTE — PROGRESS NOTE ADULT - ASSESSMENT
73F w HFrEF (EF 30%), mod AS, known LBBB, HTN, IDDM1, CKD, hypothyroidism, chronic lymphedema, suspected OHS/LELIA on 3L NC home O2 p/w 1 episode of syncope. Recent admission at [facility] (3/29-4/5) for ADHF and DUNCAN s/p diuresis, discharged with new home O2 3L NC suspecting OHS/LELIA pending outpatient w/u. Since discharge a week ago, she has been staying at home with   Pt had sob walking to car. Once in car, she was still breathing heavily. Partner noticed pt was not responding to verbal stimuli for 10-15sec and witnessed R arm jerking. Pt gained consciousness quickly without postictal symptoms. Pt went to Cardiologist Dr. Nathan who recommended pt to come to ED. No fever, cp, abd pain, n/v. Leg swelling is improved from prior. Pt on torsemide 40mg which she has been taking. Pt was discharged on 3LNC which she is currently on. Patient reports she did not take Farxiga that she was discharged on as she was concerned about side effects.     In ED, patient was found afebrile, hemodynamically stable, breathing well on 3L NC. Initial labs notable for mild hyponatremia, DUNCAN on CKD, elevated proBNP and elevated pCO2 both improved from prior.  pt also noticed with abn creatinine      1- CKD IV  with DUNCAN   2- CHF   3- lymphedema   4- severe AS  s/p tavr 4/24  5- hypotension         suspect ATN from hypotension along with contrast nephropathy   creatinine worsening requiring renal replacement therapy, HD initiated 4/18  fluid status improving   dc'd CRRT   has + uo is non oliguric   bp is low overall   off bumex drip   bumex 2mg ivp bid prn only for now   strict I/O  pressor support  with  midodrine to  30 mg tid   retacrit 07256 U Tiw for anemia

## 2024-05-07 NOTE — PROGRESS NOTE ADULT - ASSESSMENT
73F w HFrEF (EF 30%), mod AS, known LBBB, HTN, IDDM1, CKD, hypothyroidism, chronic lymphedema, p/w 1 episode of syncope with R arm jerking.     #Syncope-Aortic Stenosis  - Known Aortic Stenosis likely Severe in setting of decreased LVEF  - s/p tavr on 4/24 c/b VT -> shock -> VFib -> shock  - hypoxic/congested, and was intubated, now extubated on 4/25 in the evening, on NC   - on 4/25 with worsening bradycardia, and now s/p TVP placement followed by AV Micra placement with removal of TVP  - Remains in Sinus Rhythm/known lbbb with intermittent   - Vaso has been weaned off with the uptitration of midodrine  - On Midodrine 30mg Q8  - to consider CRT-D in the future  - Cannot initiate GDMT due to blood pressures  - would try to mobilize as best as possible.   - cont asa and statin    #Chronic Systolic HF (EF 30%), mod to severe AS, HTN, LBBB, Lymphedema   - Has known systolic HF and AS.    - Repeat TTE showed EF 30%, mild-mod LVH, mildly reduced RVF, mod-severe AS (.61 cmsq), mod pHTN  - On home Torsemide 20 mg daily, Eplerenone 25 mg daily, and Toprol  mg daily, all currently on hold  - HD per renal, CVVH remains on hold, renal function stable  - Off Bumex gtt as per Renal  - fu with renal   - very high risk of decompensation

## 2024-05-07 NOTE — PROGRESS NOTE ADULT - ASSESSMENT
A/P: 73F PMH: HFrEF (EF 30%), AS, LBBB, HTN, DM1, CKD, hypothyroidism, chronic lymphedema, LELIA (3L home O2) p/w for syncope 2/2 severe AS, s/p TAVR 4/24. Course complicated by contrast induced allergic reaction (had CTA for TAVR eval) and contrast related renal failure (acute on chronic) requiring HD. TAVR c/b VT and vfib requiring shock and flash pulmonary edema causing AHRF requiring intubation. Currently extubated,  on CVVPHD for fluid removal, and on pressor support for vasoplegia and hypovolemia due to CRRT.      NEURO:  - Extubated 4/26  - Currently AOx4  - OOBTC, c/w PT as tolerated  - PT recommends HONEY for eventual dispo    PULM  # AHRF 2/2 flash pulmonary edema  # LELIA (on 3L home oxygen)  - Extubated 4/26  - 4/27 CXR - no consolidation or pleural effusion    CV  #Shock, Vasoplegic  -  vasoplegia from sedation vs losing fluid from CVVHD vs distributive septic shock  - of vasopressin now   - endo ruling out Cushing's   - Midodrine 30 TID     #VT/vfib s/p shock  #2:1 AV block  #Severe AS s/p TAVR  - Known LBBB with first degree AV block on telemetry  - s/p Micra 4/25, no longer requiring TVP (eventual candidate for CRT per EP)  - changes noted on telemetry requiring EP f/u and possible interrogation of device    # HFrEF  - EF 25%  - Holding GDMT while on pressors    /RENAL  #ESRD  - i/s/o ATN and contrast nephropathy  - c/w pressor support   - Strict I/Os  - Trend BMP, lytes  - Avoid nephrotoxins  - Nephro following- pt still remains fluid overloaded, start IHD likely tomorrow  - start midodrine support 10 mg q8h  - c/w bumex drip 2mg/hr and monitor for UOP  - monitor daily for initiation of HD eventually    GI  - tolerating  diet    ENDO  # DM1  - on  basal/bolus lantus 32 QHS, admelog dec to 8u TIDAC   - monitor POCT FS, goal 140-180    # Hypothyroidism  - c/w home levothyroxine 75mcg    #Cushing  # L adrenal nodule  -dexamethasone suppression test w/ am cortisol x2 completed, pt is pos for Cushing's disease, f/u outpt, recheck once critical illness resolves  -f/u [place] salivary cortisol  - CT A/P demonstrating normal adrenals    SKIN  # Acute generalized exanthematous pustulosis   - c/w calamine,  Vaseline    # Psoriasis   # Lymphedema   # R. Diabetic foot ulcer  - Elevate legs  - Compression stockings, ACE wrapping  - Podiatry following- foot wounds management, decubiti precautions f/u outpt, check for iodine gel allergy    ID  #leukocytosis  - afebrile, bcx neg  - Monitor WBC and for fevers, and signs of infection    # Oral Candidiasis  - c/w nystatin     Heme  #Anemia, worsening  -AARTI vs AOCD vs anemia 2/2 blood loss vs myelosuppression iso critical illness  -trend H&H, transfuse PRN    #thrombocytopenia  -  myelosuppression iso critical illness  - no active signs of bleeding, monitor plts, transfuse PRN    ======================= DISPOSITION  =====================  [X] Critical   [ ] Guarded    [ ] Stable    [X] Maintain in CICU  [ ] Downgrade to Telemetry  [ ] Discharge Home

## 2024-05-07 NOTE — PROGRESS NOTE ADULT - SUBJECTIVE AND OBJECTIVE BOX
[facility] - Howard Kimble, Cherise, Carlos, Herman Alston  Office Number:  [phone]    Patient resting comfortably in bed in NAD.  Laying flat with no respiratory distress.  No complaints of chest pain, dyspnea, palpitations, PND, or orthopnea.  On minimal O2  CRRT now off, monitoring renal function  Bumex gtt stopped  Off vaso but on midodrine 30mg TID    ROS: negative unless otherwise mentioned.    Telemetry:  SR,     MEDICATIONS  (STANDING):  ascorbic acid 500 milliGRAM(s) Oral daily  aspirin  chewable 81 milliGRAM(s) Oral daily  atorvastatin 80 milliGRAM(s) Oral at bedtime  calamine/zinc oxide Lotion 1 Application(s) Topical three times a day  chlorhexidine 2% Cloths 1 Application(s) Topical daily  chlorhexidine 4% Liquid 1 Application(s) Topical <User Schedule>  dextrose 50% Injectable 25 Gram(s) IV Push once  dextrose 50% Injectable 12.5 Gram(s) IV Push once  dextrose 50% Injectable 25 Gram(s) IV Push once  dextrose Oral Gel 15 Gram(s) Oral once  epoetin mendoza-epbx (RETACRIT) Injectable 23699 Unit(s) IV Push <User Schedule>  ferrous    sulfate 325 milliGRAM(s) Oral two times a day  glucagon  Injectable 1 milliGRAM(s) IntraMuscular once  heparin   Injectable 5000 Unit(s) SubCutaneous every 8 hours  insulin glargine Injectable (LANTUS) 34 Unit(s) SubCutaneous at bedtime  insulin lispro (ADMELOG) corrective regimen sliding scale   SubCutaneous at bedtime  insulin lispro (ADMELOG) corrective regimen sliding scale   SubCutaneous three times a day before meals  insulin lispro Injectable (ADMELOG) 8 Unit(s) SubCutaneous three times a day before meals  levothyroxine 75 MICROGram(s) Oral daily  midodrine 30 milliGRAM(s) Oral every 8 hours  multivitamin 1 Tablet(s) Oral daily  mupirocin 2% Ointment 1 Application(s) Topical three times a day  senna 2 Tablet(s) Oral at bedtime  vasopressin Infusion 0.02 Unit(s)/Min (3 mL/Hr) IV Continuous <Continuous>    MEDICATIONS  (PRN):  polyethylene glycol 3350 17 Gram(s) Oral two times a day PRN Constipation      Allergies    contrast media (iodine-based) (Fever; Vomiting; Flushing; Hypotension; Rash; Stomach Upset)  Ativan (Rash; Urticaria)  penicillins (Hives (Mod to Severe); Anaphylaxis (Mod to Severe); Short breath (Mod to Severe); Angioedema (Mod to Severe))    Intolerances        Vital Signs Last 24 Hrs  T(C): 37.7 (07 May 2024 08:00), Max: 37.7 (07 May 2024 08:00)  T(F): 99.9 (07 May 2024 08:00), Max: 99.9 (07 May 2024 08:00)  HR: 68 (07 May 2024 08:30) (68 - 113)  BP: --  BP(mean): --  RR: 33 (07 May 2024 08:30) (14 - 33)  SpO2: 98% (07 May 2024 08:30) (89% - 100%)    Parameters below as of 07 May 2024 08:30  Patient On (Oxygen Delivery Method): nasal cannula  O2 Flow (L/min): 2      I&O's Summary    06 May 2024 07:01  -  07 May 2024 07:00  --------------------------------------------------------  IN: 797.5 mL / OUT: 2275 mL / NET: -1477.5 mL    07 May 2024 07:01  -  07 May 2024 09:31  --------------------------------------------------------  IN: 0 mL / OUT: 150 mL / NET: -150 mL        ON EXAM:    General: NAD, awake and alert, oriented x 3  HEENT: Mucous membranes are moist, anicteric  Lungs: Non-labored, breath sounds are clear bilaterally, No wheezing, rales or rhonchi  Cardiovascular: Regular, S1 and S2, no murmurs, rubs, or gallops  Gastrointestinal: Bowel Sounds present, soft, nontender.   Lymph: No peripheral edema. No lymphadenopathy.  Skin: No rashes or ulcers  Psych:  Mood & affect appropriate    LABS: All Labs Reviewed:                        8.5    12.22 )-----------( 274      ( 07 May 2024 00:32 )             26.7                         8.1    10.36 )-----------( 252      ( 06 May 2024 01:53 )             25.9                         8.0    9.22  )-----------( 205      ( 05 May 2024 04:09 )             25.5     07 May 2024 00:32    131    |  90     |  56     ----------------------------<  202    3.7     |  23     |  3.02   06 May 2024 01:53    133    |  93     |  46     ----------------------------<  172    4.0     |  25     |  3.25   05 May 2024 19:20    132    |  93     |  44     ----------------------------<  113    4.1     |  25     |  3.10     Ca    8.9        07 May 2024 00:32  Ca    8.6        06 May 2024 01:53  Ca    8.5        05 May 2024 19:20  Phos  4.7       07 May 2024 00:32  Phos  4.7       06 May 2024 01:53  Phos  4.5       05 May 2024 19:20  Mg     2.0       07 May 2024 00:32  Mg     2.2       06 May 2024 01:53  Mg     1.9       05 May 2024 19:20    TPro  6.4    /  Alb  3.1    /  TBili  0.6    /  DBili  x      /  AST  19     /  ALT  11     /  AlkPhos  76     07 May 2024 00:32  TPro  6.5    /  Alb  3.0    /  TBili  0.5    /  DBili  x      /  AST  16     /  ALT  7      /  AlkPhos  78     06 May 2024 01:53  TPro  6.5    /  Alb  3.1    /  TBili  0.4    /  DBili  x      /  AST  17     /  ALT  9      /  AlkPhos  80     05 May 2024 19:20          Blood Culture:

## 2024-05-07 NOTE — PROGRESS NOTE ADULT - SUBJECTIVE AND OBJECTIVE BOX
Interval history:  BG noted to be stable on current regimen  No episodes of hypoglycemia noted     MEDICATIONS  (STANDING):  ascorbic acid 500 milliGRAM(s) Oral daily  aspirin  chewable 81 milliGRAM(s) Oral daily  atorvastatin 80 milliGRAM(s) Oral at bedtime  calamine/zinc oxide Lotion 1 Application(s) Topical three times a day  chlorhexidine 2% Cloths 1 Application(s) Topical daily  chlorhexidine 4% Liquid 1 Application(s) Topical <User Schedule>  dextrose 50% Injectable 25 Gram(s) IV Push once  dextrose 50% Injectable 12.5 Gram(s) IV Push once  dextrose 50% Injectable 25 Gram(s) IV Push once  dextrose Oral Gel 15 Gram(s) Oral once  epoetin mendoza-epbx (RETACRIT) Injectable 71516 Unit(s) IV Push <User Schedule>  ferrous    sulfate 325 milliGRAM(s) Oral two times a day  heparin   Injectable 5000 Unit(s) SubCutaneous every 8 hours  insulin glargine Injectable (LANTUS) 34 Unit(s) SubCutaneous at bedtime  insulin lispro (ADMELOG) corrective regimen sliding scale   SubCutaneous at bedtime  insulin lispro (ADMELOG) corrective regimen sliding scale   SubCutaneous three times a day before meals  insulin lispro Injectable (ADMELOG) 8 Unit(s) SubCutaneous three times a day before meals  levothyroxine 75 MICROGram(s) Oral daily  midodrine 30 milliGRAM(s) Oral every 8 hours  multivitamin 1 Tablet(s) Oral daily  mupirocin 2% Ointment 1 Application(s) Topical three times a day  senna 2 Tablet(s) Oral at bedtime    MEDICATIONS  (PRN):  polyethylene glycol 3350 17 Gram(s) Oral two times a day PRN Constipation      Allergies    contrast media (iodine-based) (Fever; Vomiting; Flushing; Hypotension; Rash; Stomach Upset)  Ativan (Rash; Urticaria)  penicillins (Hives (Mod to Severe); Anaphylaxis (Mod to Severe); Short breath (Mod to Severe); Angioedema (Mod to Severe))    Intolerances      Review of Systems:  Constitutional: No fever  Eyes: No blurry vision  Neuro: No tremors  HEENT: No pain  Cardiovascular: No chest pain, palpitations  Respiratory: No SOB, no cough  GI: No nausea, vomiting, abdominal pain  : No dysuria  Skin: no rash  Psych: no depression  Endocrine: no polyuria, polydipsia  Hem/lymph: no swelling  Osteoporosis: no fractures    ALL OTHER SYSTEMS REVIEWED AND NEGATIVE    UNABLE TO OBTAIN    PHYSICAL EXAM:  VITALS: T(C): 36.9 (05-07-24 @ 11:00)  T(F): 98.5 (05-07-24 @ 11:00), Max: 99.9 (05-07-24 @ 08:00)  HR: 75 (05-07-24 @ 14:00) (68 - 113)  BP: 78/47 (05-07-24 @ 12:08) (78/47 - 78/47)  RR:  (14 - 33)  SpO2:  (89% - 100%)  Wt(kg): --  GENERAL: NAD, well-groomed, well-developed  EYES: No proptosis, no lid lag, anicteric  HEENT:  Atraumatic, Normocephalic, moist mucous membranes  THYROID: Normal size, no palpable nodules  RESPIRATORY: Clear to auscultation bilaterally; No rales, rhonchi, wheezing, or rubs  CARDIOVASCULAR: Regular rate and rhythm; No murmurs; no peripheral edema  GI: Soft, nontender, non distended, normal bowel sounds  SKIN: Dry, intact, No rashes or lesions  MUSCULOSKELETAL: Full range of motion, normal strength  NEURO: sensation intact, extraocular movements intact, no tremor, normal reflexes  PSYCH: Alert and oriented x 3, normal affect, normal mood  CUSHING'S SIGNS: no striae    POCT Blood Glucose.: 183 mg/dL (05-07-24 @ 12:06)  POCT Blood Glucose.: 187 mg/dL (05-07-24 @ 07:37)  POCT Blood Glucose.: 140 mg/dL (05-06-24 @ 22:24)  POCT Blood Glucose.: 92 mg/dL (05-06-24 @ 16:37)  POCT Blood Glucose.: 124 mg/dL (05-06-24 @ 12:42)  POCT Blood Glucose.: 208 mg/dL (05-06-24 @ 07:57)  POCT Blood Glucose.: 151 mg/dL (05-05-24 @ 21:21)  POCT Blood Glucose.: 125 mg/dL (05-05-24 @ 16:34)  POCT Blood Glucose.: 162 mg/dL (05-05-24 @ 12:23)  POCT Blood Glucose.: 271 mg/dL (05-05-24 @ 08:15)  POCT Blood Glucose.: 262 mg/dL (05-04-24 @ 22:01)  POCT Blood Glucose.: 109 mg/dL (05-04-24 @ 16:19)      05-07    131<L>  |  90<L>  |  56<H>  ----------------------------<  202<H>  3.7   |  23  |  3.02<H>    eGFR: 16<L>    Ca    8.9      05-07  Mg     2.0     05-07  Phos  4.7     05-07    TPro  6.4  /  Alb  3.1<L>  /  TBili  0.6  /  DBili  x   /  AST  19  /  ALT  11  /  AlkPhos  76  05-07          Thyroid Function Tests:  04-14 @ 07:18 TSH 5.06 FreeT4 1.3 T3 -- Anti TPO -- Anti Thyroglobulin Ab -- TSI --  04-13 @ 07:05 TSH 4.79 FreeT4 1.5 T3 -- Anti TPO -- Anti Thyroglobulin Ab -- TSI --

## 2024-05-07 NOTE — PROGRESS NOTE ADULT - ASSESSMENT
73F w HFrEF (EF 30%), mod AS, known LBBB, HTN, T1DM, CKD, hypothyroidism, chronic lymphedema, suspected OHS/LELIA on 3L NC home O2 p/w 1 episode of syncope with R arm jerking, likely 2/2 severe symptomatic AS. CTA imagings performed for TAVR eval, c/b DUNCAN on CKD. C/b febrile and hypotension, sepsis workup pending. CT c/a/p w/w/o IV contrast revealed incidental finding of L adrenal nodule.    #T1DM  #DKA  Has hx of T1DM since age 26  Endocrinologist: Dr. Luis Dumont  Home regimen: Lantus 33 units qhs, Novolog based on sliding scale TIDAC normally 3-5 units  Has Dexcom G7  A1c is 7.4%  C peptide undetectable <0.1 with glucose 107, confirming insulin deficiency  BG noted to be stable   PLAN  - Continue with Lantus 34 units nightly   - c/w Admelog to 8 units TIDAC  - Low dose admelog correction scale TIDAC, Low dose admelog correction scale QHS  - Goal BG in -180  - Of note, patient instructed on discharge from recent hospitalization at [place] to start farxiga for CHF. Given risks of DKA of SGLT2i in T1DM, would not start until better data is available for its benefits.  - Discharge regimen: home basal/bolus insulin, dose TBD.  - Follow up with Dr. Luis Dumont outpatient    #L adrenal nodule    Incidentally found on CT c/a/p w/wo IV contrast done for TAVR evaluation. The left adrenal gland is thickened and a 1.2 x 0.8 cm left adrenal nodule is seen. The right adrenal gland is normal.   Primary team discussed with radiology. HU of the adrenal nodule not able to be accurately determined due to motion artifact, also given imaging was taken at venous phase.    Patient had another CT scan which showed normal adrenals on 5/3/24    Test for excess adrenal hormones:   - Dex suppression test: AM cortisol 9.4 not suppressed with sufficiently high dexamethasone 374 (4/18/24). Repeat test AM cortisol 4.1 not suppressed with ACTH 9.5, dex level 449 (4/20/24). Concerning for possible Cushings, likely primary adrenal source given ACTH not elevated, however, patient is also in ICU on pressors and in a stressed state. Urine cortisol low at 1.2mcg/24 hour but only 200ml for 24 hours. Salivary cortisol cancelled for QNS, repeat salivary cortisol specimen received   - plasma metanephrines 46.8 normal range    - serum aldosterone is elevated to 37.4. Plasma renin activity 9.775. Ratio = 3.82, not elevated, no hyperaldosteronism.    - DHEAS 139 wnl. Androstenedione <10.    PLAN  - Patient with 2 positive DST - concerning for cushing syndrome. No indication for treatment at this time, would recommend repeating testing outpatient after patient resolved from acute critical illness - will give signout to patient's endocrinologist Dr. Luis Dumont prior to discharge   - Follow up salivary cortisol (specimen received)   - May need to repeat 24 hour urine cortisol once renal function improved and no longer on CRRT   - Will need follow up outpatient with Dr. Luis Dumont     #Hx of Hypothyroidism  TSH slightly elevated to 4.79-5.06 with normal FT4 1.3-1.5  - continue home LT4 75mcg daily  - repeat TFT's outpatient when clinically stabilized    ICU patient requires close monitoring.     Ana Nicholson DO   Attending Physician   Department of Endocrinology, Diabetes and Metabolism     If before 9AM or after 5PM, or on weekends/holidays, please call the Endocrine answering service for assistance ([phone]).  For nonurgent matters, please email [email or web address] for assistance.

## 2024-05-07 NOTE — PROGRESS NOTE ADULT - SUBJECTIVE AND OBJECTIVE BOX
[facility]   [phone]  RENAL FOLLOW UP NOTE  --------------------------------------------------------------------------------  Chief Complaint:    24 hour events/subjective:    seen earlier   states has no sob   feels fatigue     PAST HISTORY  --------------------------------------------------------------------------------  No significant changes to PMH, PSH, FHx, SHx, unless otherwise noted    ALLERGIES & MEDICATIONS  --------------------------------------------------------------------------------  Allergies    contrast media (iodine-based) (Fever; Vomiting; Flushing; Hypotension; Rash; Stomach Upset)  Ativan (Rash; Urticaria)  penicillins (Hives (Mod to Severe); Anaphylaxis (Mod to Severe); Short breath (Mod to Severe); Angioedema (Mod to Severe))    Intolerances      Standing Inpatient Medications  ascorbic acid 500 milliGRAM(s) Oral daily  aspirin  chewable 81 milliGRAM(s) Oral daily  atorvastatin 80 milliGRAM(s) Oral at bedtime  calamine/zinc oxide Lotion 1 Application(s) Topical three times a day  chlorhexidine 2% Cloths 1 Application(s) Topical daily  chlorhexidine 4% Liquid 1 Application(s) Topical <User Schedule>  dextrose 50% Injectable 25 Gram(s) IV Push once  dextrose 50% Injectable 12.5 Gram(s) IV Push once  dextrose 50% Injectable 25 Gram(s) IV Push once  dextrose Oral Gel 15 Gram(s) Oral once  epoetin mendoza-epbx (RETACRIT) Injectable 51462 Unit(s) IV Push <User Schedule>  ferrous    sulfate 325 milliGRAM(s) Oral two times a day  heparin   Injectable 5000 Unit(s) SubCutaneous every 8 hours  insulin glargine Injectable (LANTUS) 34 Unit(s) SubCutaneous at bedtime  insulin lispro (ADMELOG) corrective regimen sliding scale   SubCutaneous at bedtime  insulin lispro (ADMELOG) corrective regimen sliding scale   SubCutaneous three times a day before meals  insulin lispro Injectable (ADMELOG) 8 Unit(s) SubCutaneous three times a day before meals  levothyroxine 75 MICROGram(s) Oral daily  midodrine 30 milliGRAM(s) Oral every 8 hours  multivitamin 1 Tablet(s) Oral daily  mupirocin 2% Ointment 1 Application(s) Topical three times a day  senna 2 Tablet(s) Oral at bedtime    PRN Inpatient Medications  polyethylene glycol 3350 17 Gram(s) Oral two times a day PRN      REVIEW OF SYSTEMS  --------------------------------------------------------------------------------    Gen: denies  fevers/chills,  CVS: denies chest pain/palpitations  Resp: denies SOB/Cough  GI: Denies N/V/Abd pain  : Denies dysuria    VITALS/PHYSICAL EXAM  --------------------------------------------------------------------------------  T(C): 36.8 (05-07-24 @ 19:00), Max: 37.7 (05-07-24 @ 08:00)  HR: 70 (05-07-24 @ 21:00) (68 - 93)  BP: 78/47 (05-07-24 @ 12:08) (78/47 - 78/47)  RR: 19 (05-07-24 @ 21:00) (17 - 33)  SpO2: 100% (05-07-24 @ 21:00) (89% - 100%)  Wt(kg): --        05-06-24 @ 07:01  -  05-07-24 @ 07:00  --------------------------------------------------------  IN: 797.5 mL / OUT: 2275 mL / NET: -1477.5 mL    05-07-24 @ 07:01  -  05-07-24 @ 21:48  --------------------------------------------------------  IN: 845 mL / OUT: 840 mL / NET: 5 mL      Physical Exam:  	  Gen:  on RA, comfortable appearing   	Pulm: decrease bs  no rales or ronchi or wheezing  	CV: No JVD. RRR, S1S2; no rub II/VI M   	Abd: +BS, soft, nontender/nondistended, obese   	UE: Warm, no cyanosis  no clubbing,   +  edema  	LE: Warm, no cyanosis  no clubbing, 2+  softer  edema, B/L   	Neuro: alert and oriented     LABS/STUDIES  --------------------------------------------------------------------------------              8.5    12.22 >-----------<  274      [05-07-24 @ 00:32]              26.7     131  |  90  |  56  ----------------------------<  202      [05-07-24 @ 00:32]  3.7   |  23  |  3.02        Ca     8.9     [05-07-24 @ 00:32]      Mg     2.0     [05-07-24 @ 00:32]      Phos  4.7     [05-07-24 @ 00:32]    TPro  6.4  /  Alb  3.1  /  TBili  0.6  /  DBili  x   /  AST  19  /  ALT  11  /  AlkPhos  76  [05-07-24 @ 00:32]          Creatinine Trend:  SCr 3.02 [05-07 @ 00:32]  SCr 3.25 [05-06 @ 01:53]  SCr 3.10 [05-05 @ 19:20]  SCr 2.75 [05-05 @ 13:50]  SCr 2.83 [05-05 @ 04:09]      PTH -- (Ca 8.4)      [04-19-24 @ 17:54]   109  TSH 5.06      [04-14-24 @ 07:18]  Lipid: chol 74, TG 70, HDL 33, LDL --      [04-13-24 @ 07:05]

## 2024-05-07 NOTE — PROGRESS NOTE ADULT - SUBJECTIVE AND OBJECTIVE BOX
EVELYN LOPEZ  MRN-[id number]  Patient is a 73y old  Female who presents with a chief complaint of Syncope (07 May 2024 15:05)    HPI:  73F w HFrEF (EF 30%), mod AS, known LBBB, HTN, IDDM1, CKD, hypothyroidism, chronic lymphedema, suspected OHS/LELIA on 3L NC home O2 p/w 1 episode of syncope. Recent admission at [facility] (3/29-4/5) for ADHF and DUNCAN s/p diuresis, discharged with new home O2 3L NC suspecting OHS/LELIA pending outpatient w/u. Since discharge a week ago, she has been staying at home with   Pt had sob walking to car. Once in car, she was still breathing heavily. Partner noticed pt was not responding to verbal stimuli for 10-15sec and witnessed R arm jerking. Pt gained consciousness quickly without postictal symptoms. Pt went to Cardiologist Dr. Nathan who recommended pt to come to ED. No fever, cp, abd pain, n/v. Leg swelling is improved from prior. Pt on torsemide 40mg which she has been taking. Pt was discharged on 3LNC which she is currently on. Patient reports she did not take Farxiga that she was discharged on as she was concerned about side effects.     In ED, patient was found afebrile, hemodynamically stable, breathing well on 3L NC. Initial labs notable for mild hyponatremia, DUNCAN on CKD, elevated proBNP and elevated pCO2 both improved from prior. (12 Apr 2024 16:31)      Hospital Course:    24 HOUR EVENTS:    REVIEW OF SYSTEMS:    CONSTITUTIONAL: No weakness, fevers or chills  EYES/ENT: No visual changes;  No vertigo or throat pain   NECK: No pain or stiffness  RESPIRATORY: No cough, wheezing, hemoptysis; No shortness of breath  CARDIOVASCULAR: No chest pain or palpitations  GASTROINTESTINAL: No abdominal or epigastric pain. No nausea, vomiting, or hematemesis; No diarrhea or constipation. No melena or hematochezia.  GENITOURINARY: No dysuria, frequency or hematuria  NEUROLOGICAL: No numbness or weakness  SKIN: No itching, rashes      ICU Vital Signs Last 24 Hrs  T(C): 36.8 (07 May 2024 19:00), Max: 37.7 (07 May 2024 08:00)  T(F): 98.2 (07 May 2024 19:00), Max: 99.9 (07 May 2024 08:00)  HR: 72 (07 May 2024 20:00) (68 - 93)  BP: 78/47 (07 May 2024 12:08) (78/47 - 78/47)  BP(mean): 58 (07 May 2024 12:08) (58 - 58)  ABP: 123/40 (07 May 2024 20:00) (56/26 - 147/52)  ABP(mean): 68 (07 May 2024 20:00) (35 - 84)  RR: 17 (07 May 2024 20:00) (17 - 33)  SpO2: 100% (07 May 2024 20:00) (89% - 100%)    O2 Parameters below as of 07 May 2024 20:00  Patient On (Oxygen Delivery Method): nasal cannula  O2 Flow (L/min): 2    CVP(mm Hg): --  CO: --  CI: --  PA: --  PA(mean): --  PA(direct): --  PCWP: --  LA: --  RA: --  SVR: --  SVRI: --  PVR: --  PVRI: --  I&O's Summary    06 May 2024 07:01  -  07 May 2024 07:00  --------------------------------------------------------  IN: 797.5 mL / OUT: 2275 mL / NET: -1477.5 mL    07 May 2024 07:01  -  07 May 2024 20:18  --------------------------------------------------------  IN: 845 mL / OUT: 840 mL / NET: 5 mL        CAPILLARY BLOOD GLUCOSE    CAPILLARY BLOOD GLUCOSE      POCT Blood Glucose.: 242 mg/dL (07 May 2024 17:04)      PHYSICAL EXAM:  GENERAL: No acute distress, well-developed  HEAD:  Atraumatic, Normocephalic  EYES: EOMI, PERRLA, conjunctiva and sclera clear  NECK: Supple, no lymphadenopathy, no JVD  CHEST/LUNG: CTAB; No wheezes, rales, or rhonchi  HEART: Regular rate and rhythm. Normal S1/S2. No murmurs, rubs, or gallops  ABDOMEN: Soft, non-tender, non-distended; normal bowel sounds, no organomegaly  EXTREMITIES:  2+ peripheral pulses b/l, No clubbing, cyanosis, or edema  NEUROLOGY: A&O x 3, no focal deficits  SKIN: No rashes or lesions    ============================I/O===========================   I&O's Detail    06 May 2024 07:01  -  07 May 2024 07:00  --------------------------------------------------------  IN:    Bumetanide: 30 mL    Oral Fluid: 760 mL    Vasopressin: 7.5 mL  Total IN: 797.5 mL    OUT:    Indwelling Catheter - Urethral (mL): 2275 mL  Total OUT: 2275 mL    Total NET: -1477.5 mL      07 May 2024 07:01  -  07 May 2024 20:18  --------------------------------------------------------  IN:    Oral Fluid: 845 mL  Total IN: 845 mL    OUT:    Indwelling Catheter - Urethral (mL): 840 mL    Vasopressin: 0 mL  Total OUT: 840 mL    Total NET: 5 mL        ============================ LABS =========================                        8.5    12.22 )-----------( 274      ( 07 May 2024 00:32 )             26.7     05-07    131<L>  |  90<L>  |  56<H>  ----------------------------<  202<H>  3.7   |  23  |  3.02<H>    Ca    8.9      07 May 2024 00:32  Phos  4.7     05-07  Mg     2.0     05-07    TPro  6.4  /  Alb  3.1<L>  /  TBili  0.6  /  DBili  x   /  AST  19  /  ALT  11  /  AlkPhos  76  05-07    LIVER FUNCTIONS - ( 07 May 2024 00:32 )  Alb: 3.1 g/dL / Pro: 6.4 g/dL / ALK PHOS: 76 U/L / ALT: 11 U/L / AST: 19 U/L / GGT: x             ABG - ( 07 May 2024 00:20 )  pH, Arterial: 7.38  pH, Blood: x     /  pCO2: 46    /  pO2: 68    / HCO3: 27    / Base Excess: 1.8   /  SaO2: 94.4        Blood Gas Arterial, Lactate: 1.2 mmol/L (05-07-24 @ 00:20)  Blood Gas Arterial, Lactate: 1.1 mmol/L (05-06-24 @ 01:47)  Blood Gas Arterial, Lactate: 1.4 mmol/L (05-05-24 @ 13:44)  Blood Gas Arterial, Lactate: 0.9 mmol/L (05-05-24 @ 04:00)    Urinalysis Basic - ( 07 May 2024 00:32 )    Color: x / Appearance: x / SG: x / pH: x  Gluc: 202 mg/dL / Ketone: x  / Bili: x / Urobili: x   Blood: x / Protein: x / Nitrite: x   Leuk Esterase: x / RBC: x / WBC x   Sq Epi: x / Non Sq Epi: x / Bacteria: x      ======================Micro/Rad/Cardio=================  Telemtry: Reviewed   EKG: Reviewed  CXR: Reviewed  Culture: Reviewed   Echo:   Cath:   ======================================================  PAST MEDICAL & SURGICAL HISTORY:  Hypertension    Diabetes    Lymphedema    H/O left bundle branch block    History of left bundle branch block (LBBB)    Systolic heart failure, chronic    Type 1 diabetes    H/O cataract 2020    History of surgical removal of meniscus of knee  left in 1971    Frozen shoulder  2000    H/O Achilles tendon repair  lengthened bilaterally, 2000    Fractured skull  1968    ====================ASSESSMENT ==============  A/P: 73F PMH: HFrEF (EF 30%), AS, LBBB, HTN, DM1, CKD, hypothyroidism, chronic lymphedema, LELIA (3L home O2) p/w for syncope 2/2 severe AS, s/p TAVR 4/24. Course complicated by contrast induced allergic reaction (had CTA for TAVR eval) and contrast related renal failure (acute on chronic) requiring HD. TAVR c/b VT and vfib requiring shock and flash pulmonary edema causing AHRF requiring intubation. Currently extubated,  on CVVPHD for fluid removal, and on pressor support for vasoplegia and hypovolemia due to CRRT.      NEURO:  - Extubated 4/26  - Currently AOx4  - OOBTC, c/w PT as tolerated  - PT recommends HONEY for eventual dispo    PULM  # AHRF 2/2 flash pulmonary edema  # LELIA (on 3L home oxygen)  - Extubated 4/26  - 4/27 CXR - no consolidation or pleural effusion    CV  #Shock, Vasoplegic  -  vasoplegia from sedation vs losing fluid from CVVHD vs distributive septic shock  - of vasopressin now   - endo ruling out Cushing's   - Midodrine 30 TID     #VT/vfib s/p shock  #2:1 AV block  #Severe AS s/p TAVR  - Known LBBB with first degree AV block on telemetry  - s/p Micra 4/25, no longer requiring TVP (eventual candidate for CRT per EP)  - changes noted on telemetry requiring EP f/u and possible interrogation of device    # HFrEF  - EF 25%  - Holding GDMT while on pressors    /RENAL  #ESRD  - i/s/o ATN and contrast nephropathy  - c/w pressor support   - Strict I/Os  - Trend BMP, lytes  - Avoid nephrotoxins  - Nephro following- pt still remains fluid overloaded, start IHD likely tomorrow  - start midodrine support 10 mg q8h  - c/w bumex drip 2mg/hr and monitor for UOP  - monitor daily for initiation of HD eventually    GI  - tolerating  diet    ENDO  # DM1  - on  basal/bolus lantus 32 QHS, admelog dec to 8u TIDAC   - monitor POCT FS, goal 140-180    # Hypothyroidism  - c/w home levothyroxine 75mcg    #Cushing  # L adrenal nodule  -dexamethasone suppression test w/ am cortisol x2 completed, pt is pos for Cushing's disease, f/u outpt, recheck once critical illness resolves  -f/u [place] salivary cortisol  - CT A/P demonstrating normal adrenals    SKIN  # Acute generalized exanthematous pustulosis   - c/w calamine,  Vaseline    # Psoriasis   # Lymphedema   # R. Diabetic foot ulcer  - Elevate legs  - Compression stockings, ACE wrapping  - Podiatry following- foot wounds management, decubiti precautions f/u outpt, check for iodine gel allergy    ID  #leukocytosis  - afebrile, bcx neg  - Monitor WBC and for fevers, and signs of infection    # Oral Candidiasis  - c/w nystatin     Heme  #Anemia, worsening  -AARTI vs AOCD vs anemia 2/2 blood loss vs myelosuppression iso critical illness  -trend H&H, transfuse PRN    #thrombocytopenia  -  myelosuppression iso critical illness  - no active signs of bleeding, monitor plts, transfuse PRN    ======================= DISPOSITION  =====================  [X] Critical   [ ] Guarded    [ ] Stable    [X] Maintain in CICU  [ ] Downgrade to Telemetry  [ ] Discharge Home        Patient requires continuous monitoring with bedside rhythm monitoring, pulse ox monitoring, and intermittent blood gas analysis. Care plan discussed with ICU care team. Patient remained critical and at risk for life threatening decompensation.  Patient seen, examined and plan discussed with CCU team during rounds.     I have personally provided ____ minutes of critical care time excluding time spent on separate procedures, in addition to initial critical care time provided by the CICU Attending, Dr. Olivas             By signing my name below, I, Quiana Gardiner, attest that this documentation has been prepared under the direction and in the presence of Angela Cross NP   Electronically signed: Madison Chavarria, 05-07-24 @ 20:18    I, Angela Cross, personally performed the services described in this documentation. All medical record entries made by the scribe were at my direction and in my presence. I have reviewed the chart and agree that the record reflects my personal performance and is accurate and complete  Electronically signed: Angela Cross NP        EVELYN LOPEZ  MRN-[id number]  Patient is a 73y old  Female who presents with a chief complaint of Syncope (07 May 2024 15:05)    HPI:  73F w HFrEF (EF 30%), mod AS, known LBBB, HTN, IDDM1, CKD, hypothyroidism, chronic lymphedema, suspected OHS/LELIA on 3L NC home O2 p/w 1 episode of syncope. Recent admission at [facility] (3/29-4/5) for ADHF and DUNCAN s/p diuresis, discharged with new home O2 3L NC suspecting OHS/LELIA pending outpatient w/u. Since discharge a week ago, she has been staying at home with   Pt had sob walking to car. Once in car, she was still breathing heavily. Partner noticed pt was not responding to verbal stimuli for 10-15sec and witnessed R arm jerking. Pt gained consciousness quickly without postictal symptoms. Pt went to Cardiologist Dr. Nathan who recommended pt to come to ED. No fever, cp, abd pain, n/v. Leg swelling is improved from prior. Pt on torsemide 40mg which she has been taking. Pt was discharged on 3LNC which she is currently on. Patient reports she did not take Farxiga that she was discharged on as she was concerned about side effects.     In ED, patient was found afebrile, hemodynamically stable, breathing well on 3L NC. Initial labs notable for mild hyponatremia, DUNCAN on CKD, elevated proBNP and elevated pCO2 both improved from prior. (12 Apr 2024 16:31)      Hospital Course:    24 HOUR EVENTS:    REVIEW OF SYSTEMS:    CONSTITUTIONAL: No weakness, fevers or chills  EYES/ENT: No visual changes;  No vertigo or throat pain   NECK: No pain or stiffness  RESPIRATORY: No cough, wheezing, hemoptysis; No shortness of breath  CARDIOVASCULAR: No chest pain or palpitations  GASTROINTESTINAL: No abdominal or epigastric pain. No nausea, vomiting, or hematemesis; No diarrhea or constipation. No melena or hematochezia.  GENITOURINARY: No dysuria, frequency or hematuria  NEUROLOGICAL: No numbness or weakness  SKIN: No itching, rashes      ICU Vital Signs Last 24 Hrs  T(C): 36.8 (07 May 2024 19:00), Max: 37.7 (07 May 2024 08:00)  T(F): 98.2 (07 May 2024 19:00), Max: 99.9 (07 May 2024 08:00)  HR: 72 (07 May 2024 20:00) (68 - 93)  BP: 78/47 (07 May 2024 12:08) (78/47 - 78/47)  BP(mean): 58 (07 May 2024 12:08) (58 - 58)  ABP: 123/40 (07 May 2024 20:00) (56/26 - 147/52)  ABP(mean): 68 (07 May 2024 20:00) (35 - 84)  RR: 17 (07 May 2024 20:00) (17 - 33)  SpO2: 100% (07 May 2024 20:00) (89% - 100%)    O2 Parameters below as of 07 May 2024 20:00  Patient On (Oxygen Delivery Method): nasal cannula  O2 Flow (L/min): 2    I&O's Summary    06 May 2024 07:01  -  07 May 2024 07:00  --------------------------------------------------------  IN: 797.5 mL / OUT: 2275 mL / NET: -1477.5 mL    07 May 2024 07:01  -  07 May 2024 20:18  --------------------------------------------------------  IN: 845 mL / OUT: 840 mL / NET: 5 mL        CAPILLARY BLOOD GLUCOSE    CAPILLARY BLOOD GLUCOSE      POCT Blood Glucose.: 242 mg/dL (07 May 2024 17:04)      PHYSICAL EXAM:  GENERAL: No acute distress, well-developed  HEAD:  Atraumatic, Normocephalic  EYES: EOMI, PERRLA, conjunctiva and sclera clear  NECK: Supple, no lymphadenopathy, no JVD  CHEST/LUNG: CTAB; No wheezes, rales, or rhonchi  HEART: Regular rate and rhythm. Normal S1/S2. No murmurs, rubs, or gallops  ABDOMEN: Soft, non-tender, non-distended; normal bowel sounds, no organomegaly  EXTREMITIES:  2+ peripheral pulses b/l, No clubbing, cyanosis, or edema  NEUROLOGY: A&O x 3, no focal deficits  SKIN: No rashes or lesions    ============================I/O===========================   I&O's Detail    06 May 2024 07:01  -  07 May 2024 07:00  --------------------------------------------------------  IN:    Bumetanide: 30 mL    Oral Fluid: 760 mL    Vasopressin: 7.5 mL  Total IN: 797.5 mL    OUT:    Indwelling Catheter - Urethral (mL): 2275 mL  Total OUT: 2275 mL    Total NET: -1477.5 mL      07 May 2024 07:01  -  07 May 2024 20:18  --------------------------------------------------------  IN:    Oral Fluid: 845 mL  Total IN: 845 mL    OUT:    Indwelling Catheter - Urethral (mL): 840 mL    Vasopressin: 0 mL  Total OUT: 840 mL    Total NET: 5 mL        ============================ LABS =========================                        8.5    12.22 )-----------( 274      ( 07 May 2024 00:32 )             26.7     05-07    131<L>  |  90<L>  |  56<H>  ----------------------------<  202<H>  3.7   |  23  |  3.02<H>    Ca    8.9      07 May 2024 00:32  Phos  4.7     05-07  Mg     2.0     05-07    TPro  6.4  /  Alb  3.1<L>  /  TBili  0.6  /  DBili  x   /  AST  19  /  ALT  11  /  AlkPhos  76  05-07    LIVER FUNCTIONS - ( 07 May 2024 00:32 )  Alb: 3.1 g/dL / Pro: 6.4 g/dL / ALK PHOS: 76 U/L / ALT: 11 U/L / AST: 19 U/L / GGT: x             ABG - ( 07 May 2024 00:20 )  pH, Arterial: 7.38  pH, Blood: x     /  pCO2: 46    /  pO2: 68    / HCO3: 27    / Base Excess: 1.8   /  SaO2: 94.4        Blood Gas Arterial, Lactate: 1.2 mmol/L (05-07-24 @ 00:20)  Blood Gas Arterial, Lactate: 1.1 mmol/L (05-06-24 @ 01:47)  Blood Gas Arterial, Lactate: 1.4 mmol/L (05-05-24 @ 13:44)  Blood Gas Arterial, Lactate: 0.9 mmol/L (05-05-24 @ 04:00)    Urinalysis Basic - ( 07 May 2024 00:32 )    Color: x / Appearance: x / SG: x / pH: x  Gluc: 202 mg/dL / Ketone: x  / Bili: x / Urobili: x   Blood: x / Protein: x / Nitrite: x   Leuk Esterase: x / RBC: x / WBC x   Sq Epi: x / Non Sq Epi: x / Bacteria: x      ======================Micro/Rad/Cardio=================  Telemtry: Reviewed   EKG: Reviewed  CXR: Reviewed  Culture: Reviewed   Echo:   Cath:   ======================================================  PAST MEDICAL & SURGICAL HISTORY:  Hypertension    Diabetes    Lymphedema    H/O left bundle branch block    History of left bundle branch block (LBBB)    Systolic heart failure, chronic    Type 1 diabetes    H/O cataract 2020    History of surgical removal of meniscus of knee  left in 1971    Frozen shoulder  2000    H/O Achilles tendon repair  lengthened bilaterally, 2000    Fractured skull  1968    ====================ASSESSMENT ==============  A/P: 73F PMH: HFrEF (EF 30%), AS, LBBB, HTN, DM1, CKD, hypothyroidism, chronic lymphedema, LELIA (3L home O2) p/w for syncope 2/2 severe AS, s/p TAVR 4/24. Course complicated by contrast induced allergic reaction (had CTA for TAVR eval) and contrast related renal failure (acute on chronic) requiring HD. TAVR c/b VT and vfib requiring shock and flash pulmonary edema causing AHRF requiring intubation. Currently extubated,  on CVVPHD for fluid removal, and on pressor support for vasoplegia and hypovolemia due to CRRT.      NEURO:  - Extubated 4/26  - Currently AOx4  - OOBTC, c/w PT as tolerated  - PT recommends HONEY for eventual dispo    PULM  # AHRF 2/2 flash pulmonary edema  # LELIA (on 3L home oxygen)  - Extubated 4/26  - 4/27 CXR - no consolidation or pleural effusion    CV  #Shock, Vasoplegic  -  vasoplegia from sedation vs losing fluid from CVVHD vs distributive septic shock  - off vasopressin now   - endo ruling out Cushing's  - Midodrine 30 TID     #VT/vfib s/p shock  #2:1 AV block  #Severe AS s/p TAVR  - Known LBBB with first degree AV block on telemetry  - s/p Micra 4/25, no longer requiring TVP (eventual candidate for CRT per EP)  - changes noted on telemetry requiring EP f/u and possible interrogation of device    # HFrEF  - EF 25%  - Holding GDMT while on pressors    /RENAL  #ESRD  - i/s/o ATN and contrast nephropathy  - c/w pressor support   - Strict I/Os  - Trend BMP, lytes  - Avoid nephrotoxins  - Nephro following- pt still remains fluid overloaded, start IHD likely tomorrow  - start midodrine support 10 mg q8h  - c/w bumex drip 2mg/hr and monitor for UOP  - monitor daily for initiation of HD eventually    GI  - tolerating  diet    ENDO  # DM1  - on  basal/bolus lantus 32 QHS, admelog dec to 8u TIDAC   - monitor POCT FS, goal 140-180    # Hypothyroidism  - c/w home levothyroxine 75mcg    #Cushing  # L adrenal nodule  -dexamethasone suppression test w/ am cortisol x2 completed, pt is pos for Cushing's disease, f/u outpt, recheck once critical illness resolves  -f/u [place] salivary cortisol  - CT A/P demonstrating normal adrenals    SKIN  # Acute generalized exanthematous pustulosis   - c/w calamine,  Vaseline    # Psoriasis   # Lymphedema   # R. Diabetic foot ulcer  - Elevate legs  - Compression stockings, ACE wrapping  - Podiatry following- foot wounds management, decubiti precautions f/u outpt, check for iodine gel allergy    ID  #leukocytosis  - afebrile, bcx neg  - Monitor WBC and for fevers, and signs of infection    # Oral Candidiasis  - c/w nystatin     Heme  #Anemia, worsening  -AARTI vs AOCD vs anemia 2/2 blood loss vs myelosuppression iso critical illness  -trend H&H, transfuse PRN    #thrombocytopenia  -  myelosuppression iso critical illness  - no active signs of bleeding, monitor plts, transfuse PRN    ======================= DISPOSITION  =====================  [X] Critical   [ ] Guarded    [ ] Stable    [X] Maintain in CICU  [ ] Downgrade to Telemetry  [ ] Discharge Home        Patient requires continuous monitoring with bedside rhythm monitoring, pulse ox monitoring, and intermittent blood gas analysis. Care plan discussed with ICU care team. Patient remained critical and at risk for life threatening decompensation.  Patient seen, examined and plan discussed with CCU team during rounds.     I have personally provided __30__ minutes of critical care time excluding time spent on separate procedures, in addition to initial critical care time provided by the CICU Attending, Dr. Olivas             By signing my name below, I, Quiana Gardiner, attest that this documentation has been prepared under the direction and in the presence of Angela Cross NP   Electronically signed: Lachelle Chavarria, 05-07-24 @ 20:18    I, Angela Cross, personally performed the services described in this documentation. All medical record entries made by the lachelle were at my direction and in my presence. I have reviewed the chart and agree that the record reflects my personal performance and is accurate and complete  Electronically signed: Angela Cross, NP

## 2024-05-08 NOTE — PROGRESS NOTE ADULT - SUBJECTIVE AND OBJECTIVE BOX
[facility]   [phone]  RENAL FOLLOW UP NOTE  --------------------------------------------------------------------------------  Chief Complaint:    24 hour events/subjective:    seen earlier   states feels better   denies sob     PAST HISTORY  --------------------------------------------------------------------------------  No significant changes to PMH, PSH, FHx, SHx, unless otherwise noted    ALLERGIES & MEDICATIONS  --------------------------------------------------------------------------------  Allergies    contrast media (iodine-based) (Fever; Vomiting; Flushing; Hypotension; Rash; Stomach Upset)  Ativan (Rash; Urticaria)  penicillins (Hives (Mod to Severe); Anaphylaxis (Mod to Severe); Short breath (Mod to Severe); Angioedema (Mod to Severe))    Intolerances      Standing Inpatient Medications  aspirin  chewable 81 milliGRAM(s) Oral daily  atorvastatin 80 milliGRAM(s) Oral at bedtime  calamine/zinc oxide Lotion 1 Application(s) Topical three times a day  cefTRIAXone   IVPB 1000 milliGRAM(s) IV Intermittent every 24 hours  epoetin mendoza-epbx (RETACRIT) Injectable 71369 Unit(s) IV Push <User Schedule>  ferrous    sulfate 325 milliGRAM(s) Oral two times a day  heparin   Injectable 5000 Unit(s) SubCutaneous every 8 hours  insulin glargine Injectable (LANTUS) 24 Unit(s) SubCutaneous at bedtime  insulin lispro (ADMELOG) corrective regimen sliding scale   SubCutaneous three times a day before meals  insulin lispro (ADMELOG) corrective regimen sliding scale   SubCutaneous at bedtime  insulin lispro Injectable (ADMELOG) 4 Unit(s) SubCutaneous three times a day before meals  levothyroxine 75 MICROGram(s) Oral daily  midodrine 30 milliGRAM(s) Oral every 8 hours  mupirocin 2% Ointment 1 Application(s) Topical three times a day  Nephro-molly 1 Tablet(s) Oral daily  senna 2 Tablet(s) Oral at bedtime    PRN Inpatient Medications  polyethylene glycol 3350 17 Gram(s) Oral two times a day PRN      REVIEW OF SYSTEMS  --------------------------------------------------------------------------------    Gen: denies  fevers/chills,  CVS: denies chest pain/palpitations  Resp: denies SOB/Cough  GI: Denies N/V/Abd pain  : Denies dysuria    VITALS/PHYSICAL EXAM  --------------------------------------------------------------------------------  T(C): 37 (05-08-24 @ 19:00), Max: 37 (05-08-24 @ 11:00)  HR: 71 (05-08-24 @ 20:00) (61 - 88)  BP: 118/53 (05-08-24 @ 20:00) (103/63 - 163/70)  RR: 18 (05-08-24 @ 20:00) (13 - 33)  SpO2: 100% (05-08-24 @ 20:00) (97% - 100%)  Wt(kg): --        05-07-24 @ 07:01  -  05-08-24 @ 07:00  --------------------------------------------------------  IN: 905 mL / OUT: 1395 mL / NET: -490 mL    05-08-24 @ 07:01  -  05-08-24 @ 21:05  --------------------------------------------------------  IN: 1100 mL / OUT: 660 mL / NET: 440 mL      Physical Exam:  	    Gen:  on RA, comfortable appearing   	Pulm: decrease bs  no rales or ronchi or wheezing  	CV: No JVD. RRR, S1S2; no rub II/VI M   	Abd: +BS, soft, nontender/nondistended, obese   	UE: Warm, no cyanosis  no clubbing,   +  edema  	LE: Warm, no cyanosis  no clubbing, 1+  softer  edema, B/L   	Neuro: alert and oriented       LABS/STUDIES  --------------------------------------------------------------------------------              8.6    10.61 >-----------<  287      [05-08-24 @ 00:26]              28.8     131  |  90  |  64  ----------------------------<  209      [05-08-24 @ 00:26]  3.8   |  24  |  3.08        Ca     9.2     [05-08-24 @ 00:26]      Mg     2.2     [05-08-24 @ 00:26]      Phos  6.0     [05-08-24 @ 00:26]    TPro  6.8  /  Alb  3.2  /  TBili  0.6  /  DBili  x   /  AST  19  /  ALT  10  /  AlkPhos  75  [05-08-24 @ 00:26]          Creatinine Trend:  SCr 3.08 [05-08 @ 00:26]  SCr 3.02 [05-07 @ 00:32]  SCr 3.25 [05-06 @ 01:53]  SCr 3.10 [05-05 @ 19:20]  SCr 2.75 [05-05 @ 13:50]        PTH -- (Ca 8.4)      [04-19-24 @ 17:54]   109  TSH 5.06      [04-14-24 @ 07:18]  Lipid: chol 74, TG 70, HDL 33, LDL --      [04-13-24 @ 07:05]

## 2024-05-08 NOTE — PROGRESS NOTE ADULT - NS MD NEURO CONDITIONS_SHOCK
Cardiogenic Shock

## 2024-05-08 NOTE — PROGRESS NOTE ADULT - SUBJECTIVE AND OBJECTIVE BOX
[facility]-- WOUND TEAM -- FOLLOW UP NOTE  --------------------------------------------------------------------------------    24 hour events/subjective:          Diet:  Diet, Consistent Carbohydrate w/Evening Snack (04-26-24 @ 06:08)      ROS: General/ SKIN/ MSK/ Vasc: see HPI  all other systems negative      ALLERGIES & MEDICATIONS  --------------------------------------------------------------------------------  Allergies  contrast media (iodine-based) (Fever; Vomiting; Flushing; Hypotension; Rash; Stomach Upset)  Ativan (Rash; Urticaria)  penicillins (Hives (Mod to Severe); Anaphylaxis (Mod to Severe); Short breath (Mod to Severe); Angioedema (Mod to Severe))      STANDING INPATIENT MEDICATIONS  ascorbic acid 500 milliGRAM(s) Oral daily  aspirin  chewable 81 milliGRAM(s) Oral daily  atorvastatin 80 milliGRAM(s) Oral at bedtime  calamine/zinc oxide Lotion 1 Application(s) Topical three times a day  chlorhexidine 2% Cloths 1 Application(s) Topical daily  chlorhexidine 4% Liquid 1 Application(s) Topical <User Schedule>  epoetin mendoza-epbx (RETACRIT) Injectable 51262 Unit(s) IV Push <User Schedule>  ferrous    sulfate 325 milliGRAM(s) Oral two times a day  heparin   Injectable 5000 Unit(s) SubCutaneous every 8 hours  insulin glargine Injectable (LANTUS) 24 Unit(s) SubCutaneous at bedtime  insulin lispro (ADMELOG) corrective regimen sliding scale   SubCutaneous three times a day before meals  insulin lispro (ADMELOG) corrective regimen sliding scale   SubCutaneous at bedtime  insulin lispro Injectable (ADMELOG) 4 Unit(s) SubCutaneous three times a day before meals  levothyroxine 75 MICROGram(s) Oral daily  midodrine 30 milliGRAM(s) Oral every 8 hours  multivitamin 1 Tablet(s) Oral daily  mupirocin 2% Ointment 1 Application(s) Topical three times a day  senna 2 Tablet(s) Oral at bedtime      PRN INPATIENT MEDICATION  polyethylene glycol 3350 17 Gram(s) Oral two times a day PRN        VITALS/PHYSICAL EXAM  --------------------------------------------------------------------------------  T(C): 36.6 (05-08-24 @ 07:00), Max: 36.8 (05-07-24 @ 19:00)  HR: 69 (05-08-24 @ 16:00) (61 - 88)  BP: 142/67 (05-08-24 @ 16:00) (112/65 - 163/70)  RR: 13 (05-08-24 @ 16:00) (13 - 25)  SpO2: 100% (05-08-24 @ 16:00) (94% - 100%)  Wt(kg): --              LABS/ CULTURES/ RADIOLOGY:              8.6    10.61 >-----------<  287      [05-08-24 @ 00:26]              28.8     131  |  90  |  64  ----------------------------<  209      [05-08-24 @ 00:26]  3.8   |  24  |  3.08        Ca     9.2     [05-08-24 @ 00:26]      Mg     2.2     [05-08-24 @ 00:26]      Phos  6.0     [05-08-24 @ 00:26]    TPro  6.8  /  Alb  3.2  /  TBili  0.6  /  DBili  x   /  AST  19  /  ALT  10  /  AlkPhos  75  [05-08-24 @ 00:26]      CAPILLARY BLOOD GLUCOSE  POCT Blood Glucose.: 216 mg/dL (08 May 2024 15:58)  POCT Blood Glucose.: 87 mg/dL (08 May 2024 12:37)  POCT Blood Glucose.: 74 mg/dL (08 May 2024 12:18)  POCT Blood Glucose.: 68 mg/dL (08 May 2024 11:59)  POCT Blood Glucose.: 67 mg/dL (08 May 2024 11:56)  POCT Blood Glucose.: 136 mg/dL (08 May 2024 08:33)  POCT Blood Glucose.: 170 mg/dL (07 May 2024 22:10)  POCT Blood Glucose.: 242 mg/dL (07 May 2024 17:04)        A1C with Estimated Average Glucose Result: 7.4 % (03-30-24 @ 08:21)  A1C with Estimated Average Glucose Result: 6.8 % (01-10-24 @ 08:37)   [facility]-- WOUND TEAM -- FOLLOW UP NOTE  --------------------------------------------------------------------------------    24 hour events/subjective:    Afebrile  Tolerating po w/o n/v  tolerating multilayer wrap     legs w/ less swelling & less heavy  pt now on pressors and CVVHD in CICU  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  pt had reaction to IV Conrast      Diet:  Diet, Consistent Carbohydrate w/Evening Snack (04-26-24 @ 06:08)      ROS: General/ SKIN/ MSK/ Vasc: see HPI  all other systems negative      ALLERGIES & MEDICATIONS  --------------------------------------------------------------------------------  Allergies  contrast media (iodine-based) (Fever; Vomiting; Flushing; Hypotension; Rash; Stomach Upset)  Ativan (Rash; Urticaria)  penicillins (Hives (Mod to Severe); Anaphylaxis (Mod to Severe); Short breath (Mod to Severe); Angioedema (Mod to Severe))      STANDING INPATIENT MEDICATIONS  ascorbic acid 500 milliGRAM(s) Oral daily  aspirin  chewable 81 milliGRAM(s) Oral daily  atorvastatin 80 milliGRAM(s) Oral at bedtime  calamine/zinc oxide Lotion 1 Application(s) Topical three times a day  chlorhexidine 2% Cloths 1 Application(s) Topical daily  chlorhexidine 4% Liquid 1 Application(s) Topical <User Schedule>  epoetin mendoza-epbx (RETACRIT) Injectable 80340 Unit(s) IV Push <User Schedule>  ferrous    sulfate 325 milliGRAM(s) Oral two times a day  heparin   Injectable 5000 Unit(s) SubCutaneous every 8 hours  insulin glargine Injectable (LANTUS) 24 Unit(s) SubCutaneous at bedtime  insulin lispro (ADMELOG) corrective regimen sliding scale   SubCutaneous three times a day before meals  insulin lispro (ADMELOG) corrective regimen sliding scale   SubCutaneous at bedtime  insulin lispro Injectable (ADMELOG) 4 Unit(s) SubCutaneous three times a day before meals  levothyroxine 75 MICROGram(s) Oral daily  midodrine 30 milliGRAM(s) Oral every 8 hours  multivitamin 1 Tablet(s) Oral daily  mupirocin 2% Ointment 1 Application(s) Topical three times a day  senna 2 Tablet(s) Oral at bedtime      PRN INPATIENT MEDICATION  polyethylene glycol 3350 17 Gram(s) Oral two times a day PRN        VITALS/PHYSICAL EXAM  --------------------------------------------------------------------------------  T(C): 36.6 (05-08-24 @ 07:00), Max: 36.8 (05-07-24 @ 19:00)  HR: 69 (05-08-24 @ 16:00) (61 - 88)  BP: 142/67 (05-08-24 @ 16:00) (112/65 - 163/70)  RR: 13 (05-08-24 @ 16:00) (13 - 25)  SpO2: 100% (05-08-24 @ 16:00) (94% - 100%)  Wt(kg): --      Guarded but stable,  A&Ox3, MO  WD/ WN/ WG,    Progressa bed  HEENT:  NC/AT, EOMI, sclera clear, mucosa moist, throat clear, trachea midline, neck supple  Respiratory: nonlabored w/ equal chest rise  Gastrointestinal: soft NT/ND   Neurology: strength & sensation grossly intact,   Psych: calm/ appropriate  Musculoskeletal: FROM, no deformities/ contractures  Vascular: BLE equally warm,  no cyanosis, clubbing, nor acute ischemia       improved BLE edema equal       BLE DP pulses palpable       BLE hemosiderin staining & lymphedema improving          dried skin, soft plaques & dried blisters easily lifted w/ mechanical debridement       w/ several denuded areas       w/o blistering or scant drainage      Rt plantar foot mechanically debrided of lifting dried dark blister lifted w/ small callous and denuded area       No drainage, odor, erythema, increased warmth, tenderness, induration, fluctuance, nor crepitus  Skin: dry peeling flakey pale,  scattered psoriatic plaques throughout extremities and torso     gluteal cleft w/ linear area of denuded skin w/ hyperpigmented skin in surrounding buttocks skin  Skin folds of lateral torso w/ faint erythema in creases      w/o blistering  or  drainage  No odor, erythema, increased warmth, tenderness, induration, fluctuance, nor crepitus        LABS/ CULTURES/ RADIOLOGY:              8.6    10.61 >-----------<  287      [05-08-24 @ 00:26]              28.8     131  |  90  |  64  ----------------------------<  209      [05-08-24 @ 00:26]  3.8   |  24  |  3.08        Ca     9.2     [05-08-24 @ 00:26]      Mg     2.2     [05-08-24 @ 00:26]      Phos  6.0     [05-08-24 @ 00:26]    TPro  6.8  /  Alb  3.2  /  TBili  0.6  /  DBili  x   /  AST  19  /  ALT  10  /  AlkPhos  75  [05-08-24 @ 00:26]      CAPILLARY BLOOD GLUCOSE  POCT Blood Glucose.: 216 mg/dL (08 May 2024 15:58)  POCT Blood Glucose.: 87 mg/dL (08 May 2024 12:37)  POCT Blood Glucose.: 74 mg/dL (08 May 2024 12:18)  POCT Blood Glucose.: 68 mg/dL (08 May 2024 11:59)  POCT Blood Glucose.: 67 mg/dL (08 May 2024 11:56)  POCT Blood Glucose.: 136 mg/dL (08 May 2024 08:33)  POCT Blood Glucose.: 170 mg/dL (07 May 2024 22:10)  POCT Blood Glucose.: 242 mg/dL (07 May 2024 17:04)        A1C with Estimated Average Glucose Result: 7.4 % (03-30-24 @ 08:21)  A1C with Estimated Average Glucose Result: 6.8 % (01-10-24 @ 08:37)   [facility]-- WOUND TEAM -- FOLLOW UP NOTE  --------------------------------------------------------------------------------    24 hour events/subjective:    Afebrile  Tolerating po w/o n/v  tolerating multilayer wrap     legs w/ less swelling & less heavy  pt of pressors and CVVHD   pt w/ orthostasis and remains in CICU  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  pt had reaction to IV Contrast      Diet:  Diet, Consistent Carbohydrate w/Evening Snack (04-26-24 @ 06:08)      ROS: General/ SKIN/ MSK/ Vasc: see HPI  all other systems negative      ALLERGIES & MEDICATIONS  --------------------------------------------------------------------------------  Allergies  contrast media (iodine-based) (Fever; Vomiting; Flushing; Hypotension; Rash; Stomach Upset)  Ativan (Rash; Urticaria)  penicillins (Hives (Mod to Severe); Anaphylaxis (Mod to Severe); Short breath (Mod to Severe); Angioedema (Mod to Severe))      STANDING INPATIENT MEDICATIONS  ascorbic acid 500 milliGRAM(s) Oral daily  aspirin  chewable 81 milliGRAM(s) Oral daily  atorvastatin 80 milliGRAM(s) Oral at bedtime  calamine/zinc oxide Lotion 1 Application(s) Topical three times a day  chlorhexidine 2% Cloths 1 Application(s) Topical daily  chlorhexidine 4% Liquid 1 Application(s) Topical <User Schedule>  epoetin mendoza-epbx (RETACRIT) Injectable 76366 Unit(s) IV Push <User Schedule>  ferrous    sulfate 325 milliGRAM(s) Oral two times a day  heparin   Injectable 5000 Unit(s) SubCutaneous every 8 hours  insulin glargine Injectable (LANTUS) 24 Unit(s) SubCutaneous at bedtime  insulin lispro (ADMELOG) corrective regimen sliding scale   SubCutaneous three times a day before meals  insulin lispro (ADMELOG) corrective regimen sliding scale   SubCutaneous at bedtime  insulin lispro Injectable (ADMELOG) 4 Unit(s) SubCutaneous three times a day before meals  levothyroxine 75 MICROGram(s) Oral daily  midodrine 30 milliGRAM(s) Oral every 8 hours  multivitamin 1 Tablet(s) Oral daily  mupirocin 2% Ointment 1 Application(s) Topical three times a day  senna 2 Tablet(s) Oral at bedtime      PRN INPATIENT MEDICATION  polyethylene glycol 3350 17 Gram(s) Oral two times a day PRN        VITALS/PHYSICAL EXAM  --------------------------------------------------------------------------------  T(C): 36.6 (05-08-24 @ 07:00), Max: 36.8 (05-07-24 @ 19:00)  HR: 69 (05-08-24 @ 16:00) (61 - 88)  BP: 142/67 (05-08-24 @ 16:00) (112/65 - 163/70)  RR: 13 (05-08-24 @ 16:00) (13 - 25)  SpO2: 100% (05-08-24 @ 16:00) (94% - 100%)  Wt(kg): --      Guarded but stable,  A&Ox3, MO  WD/ WN/ WG,    Progressa bed  HEENT:  NC/AT, EOMI, sclera clear, mucosa moist, throat clear, trachea midline, neck supple  Respiratory: nonlabored w/ equal chest rise  Gastrointestinal: soft NT/ND   Neurology: strength & sensation grossly intact,   Psych: calm/ appropriate  Musculoskeletal: FROM, no deformities/ contractures  Vascular: BLE equally warm,  no cyanosis, clubbing, nor acute ischemia       improved BLE edema equal       BLE DP pulses palpable       BLE hemosiderin staining & lymphedema improving          dried skin, soft plaques & dried blisters easily lifted w/ mechanical debridement       w/ several denuded areas       w/o blistering or scant drainage      Rt plantar foot mechanically debrided of lifting dried dark blister lifted w/ small callous and denuded area       No drainage, odor, erythema, increased warmth, tenderness, induration, fluctuance, nor crepitus  Skin: dry peeling flakey pale,  scattered psoriatic plaques throughout extremities and torso     gluteal cleft w/ linear area of denuded skin w/ hyperpigmented skin in surrounding buttocks skin  Skin folds of lateral torso w/ faint erythema in creases      w/o blistering  or  drainage  No odor, erythema, increased warmth, tenderness, induration, fluctuance, nor crepitus        LABS/ CULTURES/ RADIOLOGY:              8.6    10.61 >-----------<  287      [05-08-24 @ 00:26]              28.8     131  |  90  |  64  ----------------------------<  209      [05-08-24 @ 00:26]  3.8   |  24  |  3.08        Ca     9.2     [05-08-24 @ 00:26]      Mg     2.2     [05-08-24 @ 00:26]      Phos  6.0     [05-08-24 @ 00:26]    TPro  6.8  /  Alb  3.2  /  TBili  0.6  /  DBili  x   /  AST  19  /  ALT  10  /  AlkPhos  75  [05-08-24 @ 00:26]      CAPILLARY BLOOD GLUCOSE  POCT Blood Glucose.: 216 mg/dL (08 May 2024 15:58)  POCT Blood Glucose.: 87 mg/dL (08 May 2024 12:37)  POCT Blood Glucose.: 74 mg/dL (08 May 2024 12:18)  POCT Blood Glucose.: 68 mg/dL (08 May 2024 11:59)  POCT Blood Glucose.: 67 mg/dL (08 May 2024 11:56)  POCT Blood Glucose.: 136 mg/dL (08 May 2024 08:33)  POCT Blood Glucose.: 170 mg/dL (07 May 2024 22:10)  POCT Blood Glucose.: 242 mg/dL (07 May 2024 17:04)        A1C with Estimated Average Glucose Result: 7.4 % (03-30-24 @ 08:21)  A1C with Estimated Average Glucose Result: 6.8 % (01-10-24 @ 08:37)

## 2024-05-08 NOTE — CHART NOTE - NSCHARTNOTEFT_GEN_A_CORE
NUTRITION FOLLOW UP NOTE    PATIENT SEEN FOR: RD consult/nutrition follow up     SOURCE: [x] Patient  [x] Current Medical Record  [] RN  [] Family/support person at bedside  [] Patient unavailable/inappropriate  [] Other:    CHART REVIEWED/EVENTS NOTED.  [] No changes to nutrition care plan to note  [x] Nutrition Status:  - DUNCAN on CKD4. Pt previously on CRRT, now discontinued, no immediate plans to reinstate CRRT/HD at this time.   - Vasopressin d/c     DIET ORDER:   Diet, Consistent Carbohydrate w/Evening Snack (24)      CURRENT DIET ORDER IS:  [] Appropriate:  [] Inadequate:  [x] Other: see recommendations    NUTRITION INTAKE/PROVISION:  [x] PO:   - Pt reports good PO intake, consuming ~75% of her meals. States yesterday her BP was low and she vomited but besides that, has been eating well recently. Has been calling in her meals.    ANTHROPOMETRICS:  Drug Dosing Weight  Height (cm): 180.3 (2024 10:19)  Weight (kg): 149.7 (2024 10:19)  BMI (kg/m2): 46.1 (2024 10:19)    Weights:   Daily Weight in k.2 (-), 121.7 (-), 120.3 (-), 122.8 (-), 123.3 (-), 119 (-)   Weights downtrending during admission, likely in setting of fluid shifts. RD to continue to monitor weight trends as able/available.    NUTRITIONALLY PERTINENT MEDICATIONS:  MEDICATIONS  (STANDING):  ascorbic acid  atorvastatin  ferrous    sulfate  insulin glargine Injectable (LANTUS)  insulin lispro (ADMELOG) corrective regimen sliding scale  insulin lispro (ADMELOG) corrective regimen sliding scale  insulin lispro Injectable (ADMELOG)  levothyroxine  midodrine  multivitamin  senna       NUTRITIONALLY PERTINENT LABS:   Na131 mmol/L<L> Glu 209 mg/dL<H> K+ 3.8 mmol/L Cr  3.08 mg/dL<H> BUN 64 mg/dL<H> 05-08 Phos 6.0 mg/dL<H> 05-08 Alb 3.2 g/dL<L>  Chol 74 mg/dL LDL --    HDL 33 mg/dL<L> Trig 70 mg/dL ALT 10 U/L AST 19 U/L Alkaline Phosphatase 75 U/L    A1C with Estimated Average Glucose Result: 7.4 % (24 @ 08:21)  A1C with Estimated Average Glucose Result: 6.8 % (01-10-24 @ 08:37)          Finger Sticks:  POCT Blood Glucose.: 136 mg/dL ( @ 08:33)  POCT Blood Glucose.: 170 mg/dL ( @ 22:10)  POCT Blood Glucose.: 242 mg/dL ( @ 17:04)  POCT Blood Glucose.: 183 mg/dL ( @ 12:06)      NUTRITIONALLY PERTINENT MEDICATIONS/LABS:  [x] Reviewed  [x] Relevant notes on medications/labs:  - Ordered for a multivitamin, Vitamin C, and ferrous sulfate  - Hx DM1. Ordered for Lantus, admelog ISS, and admelog before meals. BG readings trending normal-high.   - Ordered for PO Synthroid  - Phos now trending elevated; K+ WNL.    EDEMA:  [x] Reviewed  [x] Relevant notes:  - 3+ left leg; right leg; right ankle; left ankle; left foot; right foot; left arm    GI/ I&O:  [x] Reviewed  [x] Relevant notes:  - Vomiting , due to low BP per pt    SKIN:   [] No pressure injuries documented, per nursing flowsheet  [] Pressure injury previously noted  [x] Change in pressure injury documentation: Stage 2 pressure injuries to sacrum and L heel noted per flowsheets. WC consult pending  [] Other:    ESTIMATED NEEDS:  [] No change:  [x] Updated:  Energy:  4921-0755 kcal/day (30-35 kcal/kg)  Protein:   g/day (1.2-1.5 g/kg)  Fluid:   ml/day or [x] defer to team  Based on:  lbs/70.3kg with consideration for BMI >40, acute illness, pressure injuries, and DUNCAN on CKD4    NUTRITION DIAGNOSIS:  [x] Prior Dx: Inadequate energy intake (resolved); Overweight  [x] New Dx: Increased nutrient needs (protein, energy) related to increased physiological demand for nutrients as evidenced by stage 2 pressure injuries, HF, and DUNCAN on CKD4.  - Goal: Pt will meet >80% estimated nutrient needs.     EDUCATION:  [x] Yes: Discussed importance of adequate protein-energy consumption to meet estimated nutrient needs. Encouraged intake of meals as tolerated. Encouraged intake of protein-rich foods at mealtimes to help preserve lean muscle mass and aid in wound healing. Reviewed food choices that are high in protein. Also reviewed high phosphorus foods to limit due to elevated phosphorus levels. Pt noted with good comprehension and made aware RD remains available for further questions/concerns.   [] Not appropriate/warranted    NUTRITION CARE PLAN:  1. Diet: Recommend Consistent Carbohydrate with snack, Low Sodium, No Concentrated Phosphorus diet  2. Multivitamin/mineral supplementation: Recommend change multivitamin/Vitamin C to Nephro-Mac with consideration for discontinuation of CRRT and to aid in wound healing.    [] Achieved - Continue current nutrition intervention(s)  [] Current medical condition precludes nutrition intervention at this time.    MONITORING AND EVALUATION:   RD remains available upon request and will follow up per protocol.    Sanaz Kirkland, ROCCON, CDN (Available via [facility] TEAMS)

## 2024-05-08 NOTE — PROGRESS NOTE ADULT - NUTRITIONAL ASSESSMENT
Diet, Consistent Carbohydrate w/Evening Snack (04-26-24 @ 06:08) [Active]      Please see RD assessment and/or follow up.  Managed by primary team as well

## 2024-05-08 NOTE — PROGRESS NOTE ADULT - PROBLEM SELECTOR PLAN 1
-Test BG ac and hs  - Continue with Lantus 34 units  qhs for now  - c/w Admelog 4 units TIDAC for now> will adjust as needed  - C/w Low dose admelog correction scale TIDAC, Low dose admelog correction scale QHS  -Will follow  Discharge:  Will be determined according to BG/insulin needs/PO intake  at time of discharge. Basal/bolus insulin doses TBD  - Of note, patient instructed on discharge from recent hospitalization at [facility] to start farxiga for CHF. Given risks of DKA of SGLT2i in T1DM, would not start until better data is available for its benefits.  - Follow up with Dr. Luis Dumont outpatient  -Pt will likley required rehab  F/u with optho/renal/cardiac as out pt

## 2024-05-08 NOTE — PROGRESS NOTE ADULT - ASSESSMENT
73F w HFrEF (EF 30%), mod AS, known LBBB, HTN, IDDM1, CKD, hypothyroidism, chronic lymphedema, suspected OHS/LELIA on 3L NC home O2 p/w 1 episode of syncope. Recent admission at [facility] (3/29-4/5) for ADHF and DUNCAN s/p diuresis, discharged with new home O2 3L NC suspecting OHS/LELIA pending outpatient w/u. Since discharge a week ago, she has been staying at home with   Pt had sob walking to car. Once in car, she was still breathing heavily. Partner noticed pt was not responding to verbal stimuli for 10-15sec and witnessed R arm jerking. Pt gained consciousness quickly without postictal symptoms. Pt went to Cardiologist Dr. Nathan who recommended pt to come to ED. No fever, cp, abd pain, n/v. Leg swelling is improved from prior. Pt on torsemide 40mg which she has been taking. Pt was discharged on 3LNC which she is currently on. Patient reports she did not take Farxiga that she was discharged on as she was concerned about side effects.     In ED, patient was found afebrile, hemodynamically stable, breathing well on 3L NC. Initial labs notable for mild hyponatremia, DUNCAN on CKD, elevated proBNP and elevated pCO2 both improved from prior.  pt also noticed with abn creatinine      1- CKD IV  with DUNCAN   2- CHF   3- lymphedema   4- severe AS  s/p tavr 4/24  5- hypotension         suspect ATN from hypotension along with contrast nephropathy   creatinine worsening requiring renal replacement therapy, HD initiated 4/18  fluid status improving   dc'd CRRT   has + uo is non oliguric   hold off on hd   bp is low overall   off bumex drip   bumex 2mg ivp bid prn only for now   strict I/O  pressor support  with  midodrine to  30 mg tid   d/w icu team when seen earlier   retacrit [id number] U Tiw for anemia

## 2024-05-08 NOTE — PROGRESS NOTE ADULT - SUBJECTIVE AND OBJECTIVE BOX
Dung Cabezas Nannette PGY2  [facility] Service  Internal Medicine    PATIENT: EVELYN LOPEZ, MRN: [id number]    CHIEF COMPLAINT: Patient is a 73y old  Female who presents with a chief complaint of Syncope (07 May 2024 21:48)      INTERVAL HISTORY/OVERNIGHT EVENTS: No overnight events. At bedside, patient has been sleeping and eating well. Denies N/V/D/C. Last BM x1 regular yesterday. Denies abdominal pain. Denies chest pain or SOB, cough. Has been ambulating with assistance. Oriented to person, place, and time. Breathing comfortably on room air.    REVIEW OF SYSTEMS:    Constitutional:     [ ] negative [ ] fevers [ ] chills [ ] weight loss [ ] weight gain  HEENT:                  [ ] negative [ ] dry eyes [ ] eye irritation [ ] postnasal drip [ ] nasal congestion  CV:                         [ ] negative  [ ] chest pain [ ] orthopnea [ ] palpitations [ ] murmur  Resp:                     [ ] negative [ ] cough [ ] shortness of breath [ ] dyspnea [ ] wheezing [ ] sputum [ ] hemoptysis  GI:                          [ ] negative [ ] nausea [ ] vomiting [ ] diarrhea [ ] constipation [ ] abd pain [ ] dysphagia   :                        [ ] negative [ ] dysuria [ ] nocturia [ ] hematuria [ ] increased urinary frequency  Musculoskeletal: [ ] negative [ ] back pain [ ] myalgias [ ] arthralgias [ ] fracture  Skin:                       [ ] negative [ ] rash [ ] itch  Neurological:        [ ] negative [ ] headache [ ] dizziness [ ] syncope [ ] weakness [ ] numbness  Psychiatric:           [ ] negative [ ] anxiety [ ] depression  Endocrine:            [ ] negative [ ] diabetes [ ] thyroid problem  Heme/Lymph:      [ ] negative [ ] anemia [ ] bleeding problem  Allergic/Immune: [ ] negative [ ] itchy eyes [ ] nasal discharge [ ] hives [ ] angioedema    [ ] All other systems negative  [ ] Unable to assess ROS because ________.    MEDICATIONS:  MEDICATIONS  (STANDING):  ascorbic acid 500 milliGRAM(s) Oral daily  aspirin  chewable 81 milliGRAM(s) Oral daily  atorvastatin 80 milliGRAM(s) Oral at bedtime  calamine/zinc oxide Lotion 1 Application(s) Topical three times a day  chlorhexidine 2% Cloths 1 Application(s) Topical daily  chlorhexidine 4% Liquid 1 Application(s) Topical <User Schedule>  dextrose 50% Injectable 25 Gram(s) IV Push once  dextrose 50% Injectable 12.5 Gram(s) IV Push once  dextrose 50% Injectable 25 Gram(s) IV Push once  dextrose Oral Gel 15 Gram(s) Oral once  epoetin mendoza-epbx (RETACRIT) Injectable 36398 Unit(s) IV Push <User Schedule>  ferrous    sulfate 325 milliGRAM(s) Oral two times a day  heparin   Injectable 5000 Unit(s) SubCutaneous every 8 hours  insulin glargine Injectable (LANTUS) 34 Unit(s) SubCutaneous at bedtime  insulin lispro (ADMELOG) corrective regimen sliding scale   SubCutaneous three times a day before meals  insulin lispro (ADMELOG) corrective regimen sliding scale   SubCutaneous at bedtime  insulin lispro Injectable (ADMELOG) 8 Unit(s) SubCutaneous three times a day before meals  levothyroxine 75 MICROGram(s) Oral daily  midodrine 30 milliGRAM(s) Oral every 8 hours  multivitamin 1 Tablet(s) Oral daily  mupirocin 2% Ointment 1 Application(s) Topical three times a day  senna 2 Tablet(s) Oral at bedtime    MEDICATIONS  (PRN):  polyethylene glycol 3350 17 Gram(s) Oral two times a day PRN Constipation      ALLERGIES: Allergies    contrast media (iodine-based) (Fever; Vomiting; Flushing; Hypotension; Rash; Stomach Upset)  Ativan (Rash; Urticaria)  penicillins (Hives (Mod to Severe); Anaphylaxis (Mod to Severe); Short breath (Mod to Severe); Angioedema (Mod to Severe))    Intolerances        OBJECTIVE:  ICU Vital Signs Last 24 Hrs  T(C): 36.6 (08 May 2024 07:00), Max: 36.9 (07 May 2024 11:00)  T(F): 97.8 (08 May 2024 07:00), Max: 98.5 (07 May 2024 11:00)  HR: 88 (08 May 2024 08:30) (61 - 93)  BP: 113/57 (08 May 2024 08:30) (78/47 - 163/70)  BP(mean): 82 (08 May 2024 08:30) (58 - 101)  ABP: 136/35 (08 May 2024 00:15) (56/26 - 154/51)  ABP(mean): 68 (08 May 2024 00:15) (35 - 86)  RR: 25 (08 May 2024 08:30) (15 - 25)  SpO2: 98% (08 May 2024 08:30) (93% - 100%)    O2 Parameters below as of 08 May 2024 08:30  Patient On (Oxygen Delivery Method): nasal cannula  O2 Flow (L/min): 2          Adult Advanced Hemodynamics Last 24 Hrs  CVP(mm Hg): --  CVP(cm H2O): --  CO: --  CI: --  PA: --  PA(mean): --  PCWP: --  SVR: --  SVRI: --  PVR: --  PVRI: --  CAPILLARY BLOOD GLUCOSE      POCT Blood Glucose.: 136 mg/dL (08 May 2024 08:33)  POCT Blood Glucose.: 170 mg/dL (07 May 2024 22:10)  POCT Blood Glucose.: 242 mg/dL (07 May 2024 17:04)  POCT Blood Glucose.: 183 mg/dL (07 May 2024 12:06)    CAPILLARY BLOOD GLUCOSE      POCT Blood Glucose.: 136 mg/dL (08 May 2024 08:33)    I&O's Summary    07 May 2024 07:01  -  08 May 2024 07:00  --------------------------------------------------------  IN: 905 mL / OUT: 1395 mL / NET: -490 mL      Daily     Daily Weight in k.2 (08 May 2024 02:00)    PHYSICAL EXAMINATION:  General: Comfortable, no acute distress, cooperative with exam.  HEENT: PERRLA, EOMI, moist mucous membranes.  Respiratory: CTAB, normal respiratory effort, no coughing, wheezes, crackles, or rales.  CV: RRR, S1S2, no murmurs, rubs or gallops. No JVD. Distal pulses intact.  Abdominal: Soft, nontender, nondistended, no rebound or guarding, normal bowel sounds.  Neurology: AOx3, no focal neuro defects, PEREZ x 4.  Extremities: No pitting edema, + Peripheral pulses.  Incisions:   Tubes:    LABS:  ABG - ( 08 May 2024 00:18 )  pH, Arterial: 7.33  pH, Blood: x     /  pCO2: 51    /  pO2: 139   / HCO3: 27    / Base Excess: 0.6   /  SaO2: 98.9                                    8.6    10.61 )-----------( 287      ( 08 May 2024 00:26 )             28.8     05-08    131<L>  |  90<L>  |  64<H>  ----------------------------<  209<H>  3.8   |  24  |  3.08<H>    Ca    9.2      08 May 2024 00:26  Phos  6.0     05-08  Mg     2.2     05-08    TPro  6.8  /  Alb  3.2<L>  /  TBili  0.6  /  DBili  x   /  AST  19  /  ALT  10  /  AlkPhos  75  05-08    LIVER FUNCTIONS - ( 08 May 2024 00:26 )  Alb: 3.2 g/dL / Pro: 6.8 g/dL / ALK PHOS: 75 U/L / ALT: 10 U/L / AST: 19 U/L / GGT: x                   Urinalysis Basic - ( 08 May 2024 00:26 )    Color: x / Appearance: x / SG: x / pH: x  Gluc: 209 mg/dL / Ketone: x  / Bili: x / Urobili: x   Blood: x / Protein: x / Nitrite: x   Leuk Esterase: x / RBC: x / WBC x   Sq Epi: x / Non Sq Epi: x / Bacteria: x        TELEMETRY:     EKG:     IMAGING:  Shyanne Ovalle PGY1  [facility]  [facility]    PATIENT: EVELYN LOPEZ, MRN: [id number]    CHIEF COMPLAINT: Patient is a 73y old  Female who presents with a chief complaint of Syncope (07 May 2024 21:48)      INTERVAL HISTORY/OVERNIGHT EVENTS: No overnight events. Pt continues to have orthostatic hypotension. Otherwise hemodynamically and clinically stable without acute complaints.     REVIEW OF SYSTEMS:    Constitutional:     [ ] negative [ ] fevers [ ] chills [ ] weight loss [ ] weight gain  HEENT:                  [ ] negative [ ] dry eyes [ ] eye irritation [ ] postnasal drip [ ] nasal congestion  CV:                         [ ] negative  [ ] chest pain [ ] orthopnea [ ] palpitations [ ] murmur  Resp:                     [ ] negative [ ] cough [ ] shortness of breath [ ] dyspnea [ ] wheezing [ ] sputum [ ] hemoptysis  GI:                          [ ] negative [ ] nausea [ ] vomiting [ ] diarrhea [ ] constipation [ ] abd pain [ ] dysphagia   :                        [ ] negative [ ] dysuria [ ] nocturia [ ] hematuria [ ] increased urinary frequency  Musculoskeletal: [ ] negative [ ] back pain [ ] myalgias [ ] arthralgias [ ] fracture  Skin:                       [ ] negative [ ] rash [ ] itch  Neurological:        [ ] negative [ ] headache [ ] dizziness [ ] syncope [ ] weakness [ ] numbness  Psychiatric:           [ ] negative [ ] anxiety [ ] depression  Endocrine:            [ ] negative [ ] diabetes [ ] thyroid problem  Heme/Lymph:      [ ] negative [ ] anemia [ ] bleeding problem  Allergic/Immune: [ ] negative [ ] itchy eyes [ ] nasal discharge [ ] hives [ ] angioedema    [ ] All other systems negative  [ ] Unable to assess ROS because ________.    MEDICATIONS:  MEDICATIONS  (STANDING):  ascorbic acid 500 milliGRAM(s) Oral daily  aspirin  chewable 81 milliGRAM(s) Oral daily  atorvastatin 80 milliGRAM(s) Oral at bedtime  calamine/zinc oxide Lotion 1 Application(s) Topical three times a day  chlorhexidine 2% Cloths 1 Application(s) Topical daily  chlorhexidine 4% Liquid 1 Application(s) Topical <User Schedule>  dextrose 50% Injectable 25 Gram(s) IV Push once  dextrose 50% Injectable 12.5 Gram(s) IV Push once  dextrose 50% Injectable 25 Gram(s) IV Push once  dextrose Oral Gel 15 Gram(s) Oral once  epoetin mendoza-epbx (RETACRIT) Injectable 41979 Unit(s) IV Push <User Schedule>  ferrous    sulfate 325 milliGRAM(s) Oral two times a day  heparin   Injectable 5000 Unit(s) SubCutaneous every 8 hours  insulin glargine Injectable (LANTUS) 34 Unit(s) SubCutaneous at bedtime  insulin lispro (ADMELOG) corrective regimen sliding scale   SubCutaneous three times a day before meals  insulin lispro (ADMELOG) corrective regimen sliding scale   SubCutaneous at bedtime  insulin lispro Injectable (ADMELOG) 8 Unit(s) SubCutaneous three times a day before meals  levothyroxine 75 MICROGram(s) Oral daily  midodrine 30 milliGRAM(s) Oral every 8 hours  multivitamin 1 Tablet(s) Oral daily  mupirocin 2% Ointment 1 Application(s) Topical three times a day  senna 2 Tablet(s) Oral at bedtime    MEDICATIONS  (PRN):  polyethylene glycol 3350 17 Gram(s) Oral two times a day PRN Constipation      ALLERGIES: Allergies    contrast media (iodine-based) (Fever; Vomiting; Flushing; Hypotension; Rash; Stomach Upset)  Ativan (Rash; Urticaria)  penicillins (Hives (Mod to Severe); Anaphylaxis (Mod to Severe); Short breath (Mod to Severe); Angioedema (Mod to Severe))    Intolerances        OBJECTIVE:  ICU Vital Signs Last 24 Hrs  T(C): 36.6 (08 May 2024 07:00), Max: 36.9 (07 May 2024 11:00)  T(F): 97.8 (08 May 2024 07:00), Max: 98.5 (07 May 2024 11:00)  HR: 88 (08 May 2024 08:30) (61 - 93)  BP: 113/57 (08 May 2024 08:30) (78/47 - 163/70)  BP(mean): 82 (08 May 2024 08:30) (58 - 101)  ABP: 136/35 (08 May 2024 00:15) (56/26 - 154/51)  ABP(mean): 68 (08 May 2024 00:15) (35 - 86)  RR: 25 (08 May 2024 08:30) (15 - 25)  SpO2: 98% (08 May 2024 08:30) (93% - 100%)    O2 Parameters below as of 08 May 2024 08:30  Patient On (Oxygen Delivery Method): nasal cannula  O2 Flow (L/min): 2          Adult Advanced Hemodynamics Last 24 Hrs  CVP(mm Hg): --  CVP(cm H2O): --  CO: --  CI: --  PA: --  PA(mean): --  PCWP: --  SVR: --  SVRI: --  PVR: --  PVRI: --  CAPILLARY BLOOD GLUCOSE      POCT Blood Glucose.: 136 mg/dL (08 May 2024 08:33)  POCT Blood Glucose.: 170 mg/dL (07 May 2024 22:10)  POCT Blood Glucose.: 242 mg/dL (07 May 2024 17:04)  POCT Blood Glucose.: 183 mg/dL (07 May 2024 12:06)    CAPILLARY BLOOD GLUCOSE      POCT Blood Glucose.: 136 mg/dL (08 May 2024 08:33)    I&O's Summary    07 May 2024 07:01  -  08 May 2024 07:00  --------------------------------------------------------  IN: 905 mL / OUT: 1395 mL / NET: -490 mL      Daily     Daily Weight in k.2 (08 May 2024 02:00)    PHYSICAL EXAMINATION:  General: Comfortable, no acute distress, cooperative with exam.  HEENT: PERRLA, EOMI, moist mucous membranes.  Respiratory: CTAB, normal respiratory effort, no coughing, wheezes, crackles, or rales.  CV: RRR, S1S2, no murmurs, rubs or gallops. No JVD. Distal pulses intact.  Abdominal: Soft, nontender, nondistended, no rebound or guarding, normal bowel sounds.  Neurology: AOx3, no focal neuro defects, PEREZ x 4.  Extremities: No pitting edema, + Peripheral pulses.    LABS:  ABG - ( 08 May 2024 00:18 )  pH, Arterial: 7.33  pH, Blood: x     /  pCO2: 51    /  pO2: 139   / HCO3: 27    / Base Excess: 0.6   /  SaO2: 98.9                                    8.6    10.61 )-----------( 287      ( 08 May 2024 00:26 )             28.8     05-08    131<L>  |  90<L>  |  64<H>  ----------------------------<  209<H>  3.8   |  24  |  3.08<H>    Ca    9.2      08 May 2024 00:26  Phos  6.0     05-08  Mg     2.2     05-08    TPro  6.8  /  Alb  3.2<L>  /  TBili  0.6  /  DBili  x   /  AST  19  /  ALT  10  /  AlkPhos  75  05-08    LIVER FUNCTIONS - ( 08 May 2024 00:26 )  Alb: 3.2 g/dL / Pro: 6.8 g/dL / ALK PHOS: 75 U/L / ALT: 10 U/L / AST: 19 U/L / GGT: x                   Urinalysis Basic - ( 08 May 2024 00:26 )    Color: x / Appearance: x / SG: x / pH: x  Gluc: 209 mg/dL / Ketone: x  / Bili: x / Urobili: x   Blood: x / Protein: x / Nitrite: x   Leuk Esterase: x / RBC: x / WBC x   Sq Epi: x / Non Sq Epi: x / Bacteria: x        TELEMETRY:     EKG:     IMAGING:

## 2024-05-08 NOTE — PROGRESS NOTE ADULT - SUBJECTIVE AND OBJECTIVE BOX
DIABETES FOLLOW UP NOTE: Saw pt earlier today    Chief Complaint: Endocrine consult requested for management of DM    INTERVAL HX: Saw pt in CICU, pt reports tolerating POs and eating well but with on/off N/V due to orthostatic hypotension making her very dizzy. Had vomit yesterday after lunch> didn't get meal time insulin with rebound hyperglycemia at dinner time. Today, pt was hypoglycemic for at lunch time> team lower meal time insulin dose. Reports feeling better every day. Denies any pain/CP/SOB.       Review of Systems:  General: As above  Cardiovascular: No chest pain, palpitations  Respiratory: No SOB, no cough  GI: on/off nausea, on/off  vomiting, no abdominal pain  Endocrine: No polyuria, polydipsia or S&Sx of hypoglycemia    Allergies    contrast media (iodine-based) (Fever; Vomiting; Flushing; Hypotension; Rash; Stomach Upset)  Ativan (Rash; Urticaria)  penicillins (Hives (Mod to Severe); Anaphylaxis (Mod to Severe); Short breath (Mod to Severe); Angioedema (Mod to Severe))    Intolerances      MEDICATIONS:  atorvastatin 80 milliGRAM(s) Oral at bedtime  insulin glargine Injectable (LANTUS) 24 Unit(s) SubCutaneous at bedtime  insulin lispro (ADMELOG) corrective regimen sliding scale   SubCutaneous three times a day before meals  insulin lispro (ADMELOG) corrective regimen sliding scale   SubCutaneous at bedtime  insulin lispro Injectable (ADMELOG) 4 Unit(s) SubCutaneous three times a day before meals  levothyroxine 75 MICROGram(s) Oral daily        PHYSICAL EXAM:  VITALS: T(C): 36.6 (05-08-24 @ 07:00)  T(F): 97.8 (05-08-24 @ 07:00), Max: 98.2 (05-07-24 @ 19:00)  HR: 73 (05-08-24 @ 14:00) (61 - 88)  BP: 120/79 (05-08-24 @ 14:00) (112/65 - 163/70)  RR:  (15 - 25)  SpO2:  (94% - 100%)  Wt(kg): --  GENERAL: Female laying in bed in NAD. Wound care team changing LE dressing at time of visit  Abdomen: Soft, nontender, non distended  Extremities: Warm,+ edema in LE with ace bandages on. wound care was wrapping legs at time of visit  NEURO: A&O X3    LABS:  POCT Blood Glucose.: 87 mg/dL (05-08-24 @ 12:37)  POCT Blood Glucose.: 74 mg/dL (05-08-24 @ 12:18)  POCT Blood Glucose.: 68 mg/dL (05-08-24 @ 11:59)  POCT Blood Glucose.: 67 mg/dL (05-08-24 @ 11:56)  POCT Blood Glucose.: 136 mg/dL (05-08-24 @ 08:33)  POCT Blood Glucose.: 170 mg/dL (05-07-24 @ 22:10)  POCT Blood Glucose.: 242 mg/dL (05-07-24 @ 17:04)  POCT Blood Glucose.: 183 mg/dL (05-07-24 @ 12:06)  POCT Blood Glucose.: 187 mg/dL (05-07-24 @ 07:37)  POCT Blood Glucose.: 140 mg/dL (05-06-24 @ 22:24)  POCT Blood Glucose.: 92 mg/dL (05-06-24 @ 16:37)  POCT Blood Glucose.: 124 mg/dL (05-06-24 @ 12:42)  POCT Blood Glucose.: 208 mg/dL (05-06-24 @ 07:57)  POCT Blood Glucose.: 151 mg/dL (05-05-24 @ 21:21)  POCT Blood Glucose.: 125 mg/dL (05-05-24 @ 16:34)                            8.6    10.61 )-----------( 287      ( 08 May 2024 00:26 )             28.8       05-08    131<L>  |  90<L>  |  64<H>  ----------------------------<  209<H>  3.8   |  24  |  3.08<H>    eGFR: 16<L>    Ca    9.2      05-08  Mg     2.2     05-08  Phos  6.0     05-08    TPro  6.8  /  Alb  3.2<L>  /  TBili  0.6  /  DBili  x   /  AST  19  /  ALT  10  /  AlkPhos  75  05-08    Thyroid Function Tests:  04-14 @ 07:18 TSH 5.06 FreeT4 1.3 T3 -- Anti TPO -- Anti Thyroglobulin Ab -- TSI --  04-13 @ 07:05 TSH 4.79 FreeT4 1.5 T3 -- Anti TPO -- Anti Thyroglobulin Ab -- TSI --      A1C with Estimated Average Glucose Result: 7.4 % (03-30-24 @ 08:21)      Estimated Average Glucose: 166 mg/dL (03-30-24 @ 08:21)        04-13 Chol 74 Direct LDL -- LDL calculated 25 HDL 33<L> Trig 70

## 2024-05-08 NOTE — CHART NOTE - NSCHARTNOTEFT_GEN_A_CORE
CICU Transfer Note    Transfer from: CCU  Transfer to:  (  ) Medicine    (x) Telemetry    (  ) RCU    (  ) Palliative    (  ) Stroke Unit    (  ) _______________    Accepting physician: Dr. Rebecca Flowers    MEDICATIONS:  STANDING MEDICATIONS  ascorbic acid 500 milliGRAM(s) Oral daily  aspirin  chewable 81 milliGRAM(s) Oral daily  atorvastatin 80 milliGRAM(s) Oral at bedtime  calamine/zinc oxide Lotion 1 Application(s) Topical three times a day  chlorhexidine 2% Cloths 1 Application(s) Topical daily  chlorhexidine 4% Liquid 1 Application(s) Topical <User Schedule>  epoetin mendoza-epbx (RETACRIT) Injectable 51905 Unit(s) IV Push <User Schedule>  ferrous    sulfate 325 milliGRAM(s) Oral two times a day  heparin   Injectable 5000 Unit(s) SubCutaneous every 8 hours  insulin glargine Injectable (LANTUS) 24 Unit(s) SubCutaneous at bedtime  insulin lispro (ADMELOG) corrective regimen sliding scale   SubCutaneous at bedtime  insulin lispro (ADMELOG) corrective regimen sliding scale   SubCutaneous three times a day before meals  insulin lispro Injectable (ADMELOG) 4 Unit(s) SubCutaneous three times a day before meals  levothyroxine 75 MICROGram(s) Oral daily  midodrine 30 milliGRAM(s) Oral every 8 hours  multivitamin 1 Tablet(s) Oral daily  mupirocin 2% Ointment 1 Application(s) Topical three times a day  senna 2 Tablet(s) Oral at bedtime    PRN MEDICATIONS  polyethylene glycol 3350 17 Gram(s) Oral two times a day PRN      VITAL SIGNS: Last 24 Hours  T(C): 36.6 (08 May 2024 07:00), Max: 36.8 (07 May 2024 19:00)  T(F): 97.8 (08 May 2024 07:00), Max: 98.2 (07 May 2024 19:00)  HR: 73 (08 May 2024 15:00) (61 - 88)  BP: 120/79 (08 May 2024 14:00) (112/65 - 163/70)  BP(mean): 93 (08 May 2024 14:00) (75 - 101)  RR: 19 (08 May 2024 15:00) (15 - 25)  SpO2: 100% (08 May 2024 15:00) (94% - 100%)    LABS:                        8.6    10.61 )-----------( 287      ( 08 May 2024 00:26 )             28.8     05-08    131<L>  |  90<L>  |  64<H>  ----------------------------<  209<H>  3.8   |  24  |  3.08<H>    Ca    9.2      08 May 2024 00:26  Phos  6.0     05-08  Mg     2.2     05-08    TPro  6.8  /  Alb  3.2<L>  /  TBili  0.6  /  DBili  x   /  AST  19  /  ALT  10  /  AlkPhos  75  05-08      Urinalysis Basic - ( 08 May 2024 00:26 )    Color: x / Appearance: x / SG: x / pH: x  Gluc: 209 mg/dL / Ketone: x  / Bili: x / Urobili: x   Blood: x / Protein: x / Nitrite: x   Leuk Esterase: x / RBC: x / WBC x   Sq Epi: x / Non Sq Epi: x / Bacteria: x      ABG - ( 08 May 2024 00:18 )  pH, Arterial: 7.33  pH, Blood: x     /  pCO2: 51    /  pO2: 139   / HCO3: 27    / Base Excess: 0.6   /  SaO2: 98.9                    RADIOLOGY:    CCU COURSE:          ASSESSMENT & PLAN:         For Follow-Up: CICU Transfer Note    Transfer from: CCU  Transfer to:  (  ) Medicine    (x) Telemetry    (  ) RCU    (  ) Palliative    (  ) Stroke Unit    (  ) _______________    Accepting physician: Dr. Rebecca Flowers    MEDICATIONS:  STANDING MEDICATIONS  ascorbic acid 500 milliGRAM(s) Oral daily  aspirin  chewable 81 milliGRAM(s) Oral daily  atorvastatin 80 milliGRAM(s) Oral at bedtime  calamine/zinc oxide Lotion 1 Application(s) Topical three times a day  chlorhexidine 2% Cloths 1 Application(s) Topical daily  chlorhexidine 4% Liquid 1 Application(s) Topical <User Schedule>  epoetin mendoza-epbx (RETACRIT) Injectable 93088 Unit(s) IV Push <User Schedule>  ferrous    sulfate 325 milliGRAM(s) Oral two times a day  heparin   Injectable 5000 Unit(s) SubCutaneous every 8 hours  insulin glargine Injectable (LANTUS) 24 Unit(s) SubCutaneous at bedtime  insulin lispro (ADMELOG) corrective regimen sliding scale   SubCutaneous at bedtime  insulin lispro (ADMELOG) corrective regimen sliding scale   SubCutaneous three times a day before meals  insulin lispro Injectable (ADMELOG) 4 Unit(s) SubCutaneous three times a day before meals  levothyroxine 75 MICROGram(s) Oral daily  midodrine 30 milliGRAM(s) Oral every 8 hours  multivitamin 1 Tablet(s) Oral daily  mupirocin 2% Ointment 1 Application(s) Topical three times a day  senna 2 Tablet(s) Oral at bedtime    PRN MEDICATIONS  polyethylene glycol 3350 17 Gram(s) Oral two times a day PRN      VITAL SIGNS: Last 24 Hours  T(C): 36.6 (08 May 2024 07:00), Max: 36.8 (07 May 2024 19:00)  T(F): 97.8 (08 May 2024 07:00), Max: 98.2 (07 May 2024 19:00)  HR: 73 (08 May 2024 15:00) (61 - 88)  BP: 120/79 (08 May 2024 14:00) (112/65 - 163/70)  BP(mean): 93 (08 May 2024 14:00) (75 - 101)  RR: 19 (08 May 2024 15:00) (15 - 25)  SpO2: 100% (08 May 2024 15:00) (94% - 100%)    LABS:                        8.6    10.61 )-----------( 287      ( 08 May 2024 00:26 )             28.8     05-08    131<L>  |  90<L>  |  64<H>  ----------------------------<  209<H>  3.8   |  24  |  3.08<H>    Ca    9.2      08 May 2024 00:26  Phos  6.0     05-08  Mg     2.2     05-08    TPro  6.8  /  Alb  3.2<L>  /  TBili  0.6  /  DBili  x   /  AST  19  /  ALT  10  /  AlkPhos  75  05-08      Urinalysis Basic - ( 08 May 2024 00:26 )    Color: x / Appearance: x / SG: x / pH: x  Gluc: 209 mg/dL / Ketone: x  / Bili: x / Urobili: x   Blood: x / Protein: x / Nitrite: x   Leuk Esterase: x / RBC: x / WBC x   Sq Epi: x / Non Sq Epi: x / Bacteria: x      ABG - ( 08 May 2024 00:18 )  pH, Arterial: 7.33  pH, Blood: x     /  pCO2: 51    /  pO2: 139   / HCO3: 27    / Base Excess: 0.6   /  SaO2: 98.9                    RADIOLOGY:    CCU COURSE:  A/P: 73F PMH: HFrEF (EF 30%), AS, LBBB, HTN, DM1, CKD, hypothyroidism, chronic lymphedema, LELIA (3L home O2) who initially presented for syncope 2/2 severe AS, then had a TAVR on 4/24 that was c/b VT and vfib requiring shock and flash pulmonary edema causing AHRF requiring intubation and transfer to the CICU.    In the CICU, pt was extubated to HFNC then weaned to NC. Pulmonary function continued to improve and pt was weaned to baseline home NC (2-3 L). Pt initially required pressor support for vasoplegia 2/2 hypovolemia vs distributive shock. Pt was treated with a 5 day course of vanc and cultures were negative. Pressors were subsequently weaned to midodrine. GDMT was held during CICU course. Pt also initially required TVP for justo to the 30s with Mobitz I/II. Micra PPM was placed and TVP was removed. Pt required intermittent HD and CRRT for fluid removal due to DUNCAN on CKD likely ATN and contrast. After hemodynamic stabilization, CRRT was discontinued and pt was started bumex drip which was weaned off. Pt does have orthostatic hypotension likely from deconditioning. Pt is being treated with midodrine with leg compression, pt may benefit from an abdominal binder.   Of note, patient with 2 positive DST - concerning for cushing syndrome. No indication for inpatient treatment. Pt will need follow up outpatient with Dr. Luis Dumont    Pt is currently hemodynamically and clinically stable off pressors, off CRRT, and with baseline oxygen requirements.         ASSESSMENT & PLAN:         For Follow-Up: CICU Transfer Note    Transfer from: CCU  Transfer to:  (  ) Medicine    (x) Telemetry    (  ) RCU    (  ) Palliative    (  ) Stroke Unit    (  ) _______________    Accepting physician: Dr. Rebecca Flowers    MEDICATIONS:  STANDING MEDICATIONS  ascorbic acid 500 milliGRAM(s) Oral daily  aspirin  chewable 81 milliGRAM(s) Oral daily  atorvastatin 80 milliGRAM(s) Oral at bedtime  calamine/zinc oxide Lotion 1 Application(s) Topical three times a day  chlorhexidine 2% Cloths 1 Application(s) Topical daily  chlorhexidine 4% Liquid 1 Application(s) Topical <User Schedule>  epoetin mendoza-epbx (RETACRIT) Injectable 21445 Unit(s) IV Push <User Schedule>  ferrous    sulfate 325 milliGRAM(s) Oral two times a day  heparin   Injectable 5000 Unit(s) SubCutaneous every 8 hours  insulin glargine Injectable (LANTUS) 24 Unit(s) SubCutaneous at bedtime  insulin lispro (ADMELOG) corrective regimen sliding scale   SubCutaneous at bedtime  insulin lispro (ADMELOG) corrective regimen sliding scale   SubCutaneous three times a day before meals  insulin lispro Injectable (ADMELOG) 4 Unit(s) SubCutaneous three times a day before meals  levothyroxine 75 MICROGram(s) Oral daily  midodrine 30 milliGRAM(s) Oral every 8 hours  multivitamin 1 Tablet(s) Oral daily  mupirocin 2% Ointment 1 Application(s) Topical three times a day  senna 2 Tablet(s) Oral at bedtime    PRN MEDICATIONS  polyethylene glycol 3350 17 Gram(s) Oral two times a day PRN      VITAL SIGNS: Last 24 Hours  T(C): 36.6 (08 May 2024 07:00), Max: 36.8 (07 May 2024 19:00)  T(F): 97.8 (08 May 2024 07:00), Max: 98.2 (07 May 2024 19:00)  HR: 73 (08 May 2024 15:00) (61 - 88)  BP: 120/79 (08 May 2024 14:00) (112/65 - 163/70)  BP(mean): 93 (08 May 2024 14:00) (75 - 101)  RR: 19 (08 May 2024 15:00) (15 - 25)  SpO2: 100% (08 May 2024 15:00) (94% - 100%)    LABS:                        8.6    10.61 )-----------( 287      ( 08 May 2024 00:26 )             28.8     05-08    131<L>  |  90<L>  |  64<H>  ----------------------------<  209<H>  3.8   |  24  |  3.08<H>    Ca    9.2      08 May 2024 00:26  Phos  6.0     05-08  Mg     2.2     05-08    TPro  6.8  /  Alb  3.2<L>  /  TBili  0.6  /  DBili  x   /  AST  19  /  ALT  10  /  AlkPhos  75  05-08      Urinalysis Basic - ( 08 May 2024 00:26 )    Color: x / Appearance: x / SG: x / pH: x  Gluc: 209 mg/dL / Ketone: x  / Bili: x / Urobili: x   Blood: x / Protein: x / Nitrite: x   Leuk Esterase: x / RBC: x / WBC x   Sq Epi: x / Non Sq Epi: x / Bacteria: x      ABG - ( 08 May 2024 00:18 )  pH, Arterial: 7.33  pH, Blood: x     /  pCO2: 51    /  pO2: 139   / HCO3: 27    / Base Excess: 0.6   /  SaO2: 98.9                    RADIOLOGY:    CCU COURSE:  A/P: 73F PMH: HFrEF (EF 30%), AS, LBBB, HTN, DM1, CKD, hypothyroidism, chronic lymphedema, LELIA (3L home O2) who initially presented for syncope 2/2 severe AS, then had a TAVR on 4/24 that was c/b VT and vfib requiring shock and flash pulmonary edema causing AHRF requiring intubation and transfer to the CICU.    In the CICU, pt was extubated to HFNC then weaned to NC. Pulmonary function continued to improve and pt was weaned to baseline home NC (2-3 L). Pt initially required IV pressor support for vasoplegia 2/2 hypovolemia vs distributive shock. Pt was treated with a 5 day course of vanc and cultures were negative. Pressors were subsequently weaned to midodrine. GDMT was held during CICU course. Pt also initially required TVP for justo to the 30s with Mobitz I/II. Micra PPM was placed and TVP was removed. Pt required intermittent HD and CRRT for fluid removal due to DUNCAN on CKD likely ATN and contrast. After stabilization, CRRT was discontinued and pt was started bumex drip which was weaned off. Pt does have orthostatic hypotension likely from deconditioning. Pt is being treated with midodrine with leg compression, pt may benefit from an abdominal binder. Patient with 2 positive DST - concerning for cushing syndrome. No indication for inpatient treatment. Pt will need follow up outpatient with Dr. Luis Dumont.   Of note, pt had a hypoglycemic episode this morning, insulin was adjusted.    Pt is currently hemodynamically and clinically stable off pressors, off CRRT, and with baseline oxygen requirements.         ASSESSMENT & PLAN:   A/P: 73F PMH: HFrEF (EF 30%), AS, LBBB, HTN, DM1, CKD, hypothyroidism, chronic lymphedema, LELIA (3L home O2) p/w for syncope 2/2 severe AS, s/p TAVR 4/24. Course complicated by contrast induced allergic reaction (had CTA for TAVR eval) and contrast related renal failure (acute on chronic) requiring HD. TAVR c/b VT and vfib requiring shock and flash pulmonary edema causing AHRF requiring intubation. Currently extubated,  on CVVPHD for fluid removal, and on pressor support for vasoplegia and hypovolemia due to CRRT.      NEURO:  - Extubated 4/26  - Currently AOx4  - OOBTC, c/w PT as tolerated  - PT recommends HONEY for eventual dispo    PULM  # AHRF 2/2 flash pulmonary edema  # LELIA (on 3L home oxygen)  - Extubated 4/26  - satting well on 2 L NC  - 4/27 CXR - no consolidation or pleural effusion    CV  #Shock, Vasoplegic  -  vasoplegia from sedation vs losing fluid from CVVHD vs distributive septic shock  - off IV pressors   - c/w Midodrine 30 TID   - endo ruling out Cushing's    #VT/vfib s/p shock  #2:1 AV block  #Severe AS s/p TAVR  - Known LBBB with first degree AV block on telemetry  - s/p Micra 4/25, no longer requiring TVP (eventual candidate for CRT per EP)  - changes noted on telemetry requiring EP f/u and possible interrogation of device    # HFrEF  - EF 25%  - Holding GDMT while on pressors    #Orthostatic hypotension  Pt with significant drops in blood pressure upon standing  - c/w midodrine 30 mg TID  - maintain hydration  - c/w leg compression  - Abdominal binder   - slowly increase activity     /RENAL  #CKD  - stable off IV pressors  - Strict I/Os  - Trend BMP, lytes  - Avoid nephrotoxins  - Nephro following  - c/w midodrine support 30 mg q8h  - UOP about 40-50 ml/hr off bumex, ctm      GI  - tolerating  diet    ENDO  # DM1  - on  basal/bolus lantus 34 QHS, admelog dec to 8u TIDAC   - Pt with hypoglycemia this morning, will adjust insulin  - monitor POCT FS, goal 140-180    # Hypothyroidism  - c/w home levothyroxine 75mcg    #Cushing  # L adrenal nodule  -dexamethasone suppression test w/ am cortisol x2 completed, pt is pos for Cushing's disease, f/u outpt, recheck once critical illness resolves  -f/u [place] salivary cortisol  - CT A/P demonstrating normal adrenals    SKIN  # Acute generalized exanthematous pustulosis   - c/w calamine,  Vaseline  - wound care consulted, appreciate recs    # Psoriasis   # Lymphedema   # R. Diabetic foot ulcer  - Elevate legs  - Compression stockings, ACE wrapping  - Podiatry following- foot wounds management, decubiti precautions f/u outpt, check for iodine gel allergy    ID  #leukocytosis  - afebrile, bcx neg  - Monitor WBC and for fevers, and signs of infection    # Oral Candidiasis  - c/w nystatin     Heme  #Anemia, worsening  -AARTI vs AOCD vs anemia 2/2 blood loss vs myelosuppression iso critical illness  -trend H&H, transfuse PRN    #thrombocytopenia  - myelosuppression iso critical illness  - improved          For Follow-Up:  [] f/u UA   [] ctm glucose levels  [] pt with significant orthostatic hypotension due to deconditioning   [] slowly increase activity given orthostatic hypotension  [] wean midodrine as tolerated  [] restart gdmt as tolerated  [] pt needs to follow up with EP for possible CRT  [] pt needs to follow up with outpatient endocrinologist Dr. Luis Dumont for repeat DST

## 2024-05-08 NOTE — PROGRESS NOTE ADULT - ASSESSMENT
73F w/h/o of T1DM with unknown control due to CKD and anemia of chronic disease. DM c/b CKD. Also h/o HFrEF (EF 30%), mod AS, known LBBB, HTN,  hypothyroidism, chronic lymphedema, suspected OHS/LELIA on 3L NC home O2 p/w 1 episode of syncope with R arm jerking, likely 2/2 severe symptomatic AS. S/p AVR 4/24 c/b DUNCAN on CKD, fever and hypotension. Also  UTI/pul edema/ sepsis and L arenal nodule finding. Endocrine consult done for DM and adrenal nodule  management. Tolerating POs with prandial BG levels tightly controlled  causing hypoglycemia ac lunch today> team decrease meal time insulin. Will f/u BG on new insulin doses adn adjust to BG goal 100 to 180s.   Per endocrine attending Dr Burton> Adrenal nodule work up completed and pt will need to f/u once she is more stable as out pt.       Home regimen: Lantus 33 units qhs, Novolog based on sliding scale TIDAC normally 3-5 units  Has Dexcom G7

## 2024-05-08 NOTE — PROGRESS NOTE ADULT - ASSESSMENT
73F w HFrEF (EF 30%), mod AS, known LBBB, HTN, IDDM1, CKD, hypothyroidism, chronic lymphedema, p/w 1 episode of syncope with R arm jerking, likely 2/2 severe symptomatic AS. Pending CTA imagings for TAVR eval, c/b DUNCAN on CKD, on bumex gtt.      Wound Consult requested to assist w/ management of Psoriasis  Drug reaction  BLE Lymphedema w/ wounds  Incontinence Dermatitis Of buttocks     BLE LAc Hydrin to intact skin + Adaptic dressing QD     will continue to monitor as pt on pressor & CVVHD  Podiatry following foot wounds  Appreciate Derm Consult for Psoriasis & Drug reaction  BLE elevation & Compression  Moisturize intact skin w/ SWEEN cream BID  Nutrition Consult for optimization        encourage high quality protein, cynthia/ prosource, MVI & Vit C to promote wound healing  Hyperglycemia -  ADA diet and NPH & FS w/ ISS  Continue turning and positioning w/ offloading assistive devices as per protocol  Buttocks/ Sacrum Patito BID and prn soiling        Continue w/ attends under pads and Pericare as per protocol  Waffle Cushion to chair when oob to chair  Continue w/ low air loss pressure redistribution bed surface   Care as per CICU/medicine, will follow w/ you  Upon discharge f/u as outpatient at [facility] [address] [facility] [phone]  S/w Wound PT and D/w team & RN & attng  Staci Lau PA-C CWS [id number]  Nights/ Weekends/ Holidays please call:  General Surgery Consult pager ([phone]) for emergencies  Wound PT for multilayer leg wrapping or VAC issues ([phone])   I spent 35minutes face to face w/ this pt of which more than 50% of the time was spent counseling & coordinating care of this pt.

## 2024-05-08 NOTE — PROGRESS NOTE ADULT - PROBLEM SELECTOR PLAN 3
Incidentally found on CT c/a/p w/wo IV contrast done for TAVR evaluation. The left adrenal gland is thickened and a 1.2 x 0.8 cm left adrenal nodule is seen. The right adrenal gland is normal.   Primary team discussed with radiology. HU of the adrenal nodule not able to be accurately determined due to motion artifact, also given imaging was taken at venous phase.    Patient had another CT scan which showed normal adrenals on 5/3/24  Test for excess adrenal hormones:   - Dex suppression test: AM cortisol 9.4 not suppressed with sufficiently high dexamethasone 374 (4/18/24). Repeat test AM cortisol 4.1 not suppressed with ACTH 9.5, dex level 449 (4/20/24). Concerning for possible Cushings, likely primary adrenal source given ACTH not elevated, however, patient is also in ICU on pressors and in a stressed state. Urine cortisol low at 1.2mcg/24 hour but only 200ml for 24 hours. Salivary cortisol cancelled for QNS, repeat salivary cortisol specimen received   - plasma metanephrines 46.8 normal range    - serum aldosterone is elevated to 37.4. Plasma renin activity 9.775. Ratio = 3.82, not elevated, no hyperaldosteronism.    - DHEAS 139 wnl. Androstenedione <10.    PLAN  - Patient with 2 positive DST - concerning for cushing syndrome. No indication for treatment at this time, would recommend repeating testing outpatient after patient resolved from acute critical illness - will give signout to patient's endocrinologist Dr. Luis Dumont prior to discharge   - Follow up salivary cortisol as out pt as per Dr Burton. No need to test at this time  - May need to repeat 24 hour urine cortisol once renal function improved and no longer on CRRT   - Will need follow up outpatient with Dr. Luis Dumont

## 2024-05-08 NOTE — PROGRESS NOTE ADULT - PROBLEM SELECTOR PLAN 2
TSH slightly elevated to 4.79-5.06 with normal FT4 1.3-1.5  - continue home LT4 75mcg daily  - repeat TFT's outpatient when clinically stabilized  - Please make sure LT4 is given on an empty stomach at least one hour apart from other meds and food to ensure med absorption. DO NOT GIVE WITH VITAMINS/ANTACIDS

## 2024-05-08 NOTE — CHART NOTE - NSCHARTNOTEFT_GEN_A_CORE
MAR Accept Note  Transfer to:  tele  Accepting Attending Physician:  Rishi  Assigned Room:      Patient seen and examined.   Labs and data reviewed.   No findings precluding transfer of service.       HPI/MICU COURSE:   Please refer to MICU transfer note for full details. Briefly, this is a      FOR FOLLOW-UP:    Annie Alejandro  IM PGY-3  MAR MAR Accept Note  Transfer to:  tele  Accepting Attending Physician:  Rishi  Assigned Room:  [id number]    Patient seen and examined.   Labs and data reviewed.   No findings precluding transfer of service.       HPI/MICU COURSE:   Please refer to CICU transfer note for full details. Briefly, this is a 73F PMH: HFrEF (EF 30%), AS, LBBB, HTN, DM1, CKD, hypothyroidism, chronic lymphedema, LELIA (3L home O2) who initially presented for syncope 2/2 severe AS, then had a TAVR on 4/24 that was c/b VT and vfib requiring shock and flash pulmonary edema causing AHRF requiring intubation and transfer to the CICU.    In the CICU, pt was extubated to HFNC then weaned to NC. Pulmonary function continued to improve and pt was weaned to baseline home NC (2-3 L). Pt initially required IV pressor support for vasoplegia 2/2 hypovolemia vs distributive shock. Pt was treated with a 5 day course of vanc and cultures were negative. Pressors were subsequently weaned to midodrine. GDMT was held during CICU course. Pt also initially required TVP for justo to the 30s with Mobitz I/II. Micra PPM was placed and TVP was removed. Pt required intermittent HD and CRRT for fluid removal due to DUNCAN on CKD likely ATN and contrast. After stabilization, CRRT was discontinued and pt was started bumex drip which was weaned off. Pt does have orthostatic hypotension likely from deconditioning. Pt is being treated with midodrine with leg compression, pt may benefit from an abdominal binder. Patient with 2 positive DST - concerning for cushing syndrome. No indication for inpatient treatment. Pt will need follow up outpatient with Dr. Luis Dumont.   Of note, pt had a hypoglycemic episode this morning, insulin was adjusted. Pt is currently hemodynamically and clinically stable off pressors, off CRRT, and with baseline oxygen requirements.         FOR FOLLOW-UP:  [] f/u UA   [] ctm glucose levels  [] pt with significant orthostatic hypotension due to deconditioning   [] slowly increase activity given orthostatic hypotension  [] wean midodrine as tolerated  [] restart gdmt as tolerated  [] pt needs to follow up with EP for possible CRT  [] pt needs to follow up with outpatient endocrinologist Dr. Luis Dumont for repeat DST.    Annie Alejandro  IM PGY-3  MAR

## 2024-05-08 NOTE — PROGRESS NOTE ADULT - NSPROGADDITIONALINFOA_GEN_ALL_CORE
-Plan discussed with pt/team.  Contact info: [phone] (24/7). pager [phone]  Amion on [place]  Teams on M-T-W-F. Unavailable Thu/Weekends/Holidays  Assessed pt/labs/meds and discussed plan of care with primary team  Adjusting insulin  Discharge plan  Follow up care

## 2024-05-08 NOTE — PROGRESS NOTE ADULT - ASSESSMENT
A/P: 73F PMH: HFrEF (EF 30%), AS, LBBB, HTN, DM1, CKD, hypothyroidism, chronic lymphedema, LELIA (3L home O2) p/w for syncope 2/2 severe AS, s/p TAVR 4/24. Course complicated by contrast induced allergic reaction (had CTA for TAVR eval) and contrast related renal failure (acute on chronic) requiring HD. TAVR c/b VT and vfib requiring shock and flash pulmonary edema causing AHRF requiring intubation. Currently extubated,  on CVVPHD for fluid removal, and on pressor support for vasoplegia and hypovolemia due to CRRT.      NEURO:  - Extubated 4/26  - Currently AOx4  - OOBTC, c/w PT as tolerated  - PT recommends HONEY for eventual dispo    PULM  # AHRF 2/2 flash pulmonary edema  # LELIA (on 3L home oxygen)  - Extubated 4/26  - satting well on 2 L NC  - 4/27 CXR - no consolidation or pleural effusion    CV  #Shock, Vasoplegic  -  vasoplegia from sedation vs losing fluid from CVVHD vs distributive septic shock  - off IV pressors   - c/w Midodrine 30 TID   - endo ruling out Cushing's    #VT/vfib s/p shock  #2:1 AV block  #Severe AS s/p TAVR  - Known LBBB with first degree AV block on telemetry  - s/p Micra 4/25, no longer requiring TVP (eventual candidate for CRT per EP)  - changes noted on telemetry requiring EP f/u and possible interrogation of device    # HFrEF  - EF 25%  - Holding GDMT while on pressors    #Orthostatic hypotension  Pt with significant drops in blood pressure upon standing  - c/w midodrine 30 mg TID  - maintain hydration  - c/w leg compression  - Abdominal binder   - slowly increase activity     /RENAL  #CKD  - stable off IV pressors  - Strict I/Os  - Trend BMP, lytes  - Avoid nephrotoxins  - Nephro following  - c/w midodrine support 30 mg q8h  - UOP about 40-50 ml/hr off bumex, ctm      GI  - tolerating  diet    ENDO  # DM1  - on  basal/bolus lantus 34 QHS, admelog dec to 8u TIDAC   - Pt with hypoglycemia this morning, will adjust insulin  - monitor POCT FS, goal 140-180    # Hypothyroidism  - c/w home levothyroxine 75mcg    #Cushing  # L adrenal nodule  -dexamethasone suppression test w/ am cortisol x2 completed, pt is pos for Cushing's disease, f/u outpt, recheck once critical illness resolves  -f/u [place] salivary cortisol  - CT A/P demonstrating normal adrenals    SKIN  # Acute generalized exanthematous pustulosis   - c/w calamine,  Vaseline  - wound care consulted, appreciate recs    # Psoriasis   # Lymphedema   # R. Diabetic foot ulcer  - Elevate legs  - Compression stockings, ACE wrapping  - Podiatry following- foot wounds management, decubiti precautions f/u outpt, check for iodine gel allergy    ID  #leukocytosis  - afebrile, bcx neg  - Monitor WBC and for fevers, and signs of infection    # Oral Candidiasis  - c/w nystatin     Heme  #Anemia, worsening  -AARTI vs AOCD vs anemia 2/2 blood loss vs myelosuppression iso critical illness  -trend H&H, transfuse PRN    #thrombocytopenia  - myelosuppression iso critical illness  - improved

## 2024-05-09 NOTE — PROGRESS NOTE ADULT - PROBLEM SELECTOR PROBLEM 5
Type 1 diabetes mellitus Urinary tract infection Acute generalized exanthematous pustulosis due to drug

## 2024-05-09 NOTE — PROGRESS NOTE ADULT - SUBJECTIVE AND OBJECTIVE BOX
[facility] - Howard Kimble, Carlos Luong Savella, Cohen  Office Number:  [phone]    Patient resting comfortably in bed in NAD.  No complaints of chest pain, dyspnea, palpitations, PND, or orthopnea.  main complaint this am is nausea/vomiting    ROS: negative unless otherwise mentioned.    Telemetry:  sr    MEDICATIONS  (STANDING):  ammonium lactate 12% Lotion 1 Application(s) Topical <User Schedule>  aspirin  chewable 81 milliGRAM(s) Oral daily  atorvastatin 80 milliGRAM(s) Oral at bedtime  calamine/zinc oxide Lotion 1 Application(s) Topical three times a day  cefTRIAXone   IVPB 1000 milliGRAM(s) IV Intermittent every 24 hours  epoetin mendoza-epbx (RETACRIT) Injectable 99413 Unit(s) IV Push <User Schedule>  ferrous    sulfate 325 milliGRAM(s) Oral two times a day  heparin   Injectable 5000 Unit(s) SubCutaneous every 8 hours  insulin glargine Injectable (LANTUS) 24 Unit(s) SubCutaneous at bedtime  insulin lispro (ADMELOG) corrective regimen sliding scale   SubCutaneous at bedtime  insulin lispro (ADMELOG) corrective regimen sliding scale   SubCutaneous three times a day before meals  insulin lispro Injectable (ADMELOG) 4 Unit(s) SubCutaneous three times a day before meals  levothyroxine 75 MICROGram(s) Oral daily  midodrine 30 milliGRAM(s) Oral every 8 hours  Nephro-molly 1 Tablet(s) Oral daily  ondansetron Injectable 4 milliGRAM(s) IV Push once  senna 2 Tablet(s) Oral at bedtime    MEDICATIONS  (PRN):  polyethylene glycol 3350 17 Gram(s) Oral two times a day PRN Constipation      Allergies    contrast media (iodine-based) (Fever; Vomiting; Flushing; Hypotension; Rash; Stomach Upset)  Ativan (Rash; Urticaria)  penicillins (Hives (Mod to Severe); Anaphylaxis (Mod to Severe); Short breath (Mod to Severe); Angioedema (Mod to Severe))    Intolerances        Vital Signs Last 24 Hrs  T(C): 37.2 (09 May 2024 05:07), Max: 37.2 (09 May 2024 05:07)  T(F): 99 (09 May 2024 05:07), Max: 99 (09 May 2024 05:07)  HR: 101 (09 May 2024 05:07) (69 - 101)  BP: 111/65 (09 May 2024 05:07) (103/63 - 142/67)  BP(mean): 77 (08 May 2024 20:00) (75 - 97)  RR: 18 (09 May 2024 05:07) (13 - 33)  SpO2: 96% (09 May 2024 05:07) (96% - 100%)    Parameters below as of 09 May 2024 05:07  Patient On (Oxygen Delivery Method): nasal cannula        I&O's Summary    08 May 2024 07:01  -  09 May 2024 07:00  --------------------------------------------------------  IN: 1100 mL / OUT: 960 mL / NET: 140 mL        ON EXAM:    General: NAD, awake and alert, oriented x 3, obese  HEENT: Mucous membranes are moist, anicteric  Lungs: Non-labored, breath sounds are decreased bilaterally, No wheezing, rales or rhonchi  Cardiovascular: Regular, S1 and S2, no murmurs, rubs, or gallops  Gastrointestinal: Bowel Sounds present, soft, nontender.   Lymph: chronic peripheral edema, with wrapped legs. No lymphadenopathy.  Skin: rash improving  Psych:  Mood & affect appropriate    LABS: All Labs Reviewed:                        8.6    10.61 )-----------( 287      ( 08 May 2024 00:26 )             28.8                         8.5    12.22 )-----------( 274      ( 07 May 2024 00:32 )             26.7     08 May 2024 00:26    131    |  90     |  64     ----------------------------<  209    3.8     |  24     |  3.08   07 May 2024 00:32    131    |  90     |  56     ----------------------------<  202    3.7     |  23     |  3.02     Ca    9.2        08 May 2024 00:26  Ca    8.9        07 May 2024 00:32  Phos  6.0       08 May 2024 00:26  Phos  4.7       07 May 2024 00:32  Mg     2.2       08 May 2024 00:26  Mg     2.0       07 May 2024 00:32    TPro  6.8    /  Alb  3.2    /  TBili  0.6    /  DBili  x      /  AST  19     /  ALT  10     /  AlkPhos  75     08 May 2024 00:26  TPro  6.4    /  Alb  3.1    /  TBili  0.6    /  DBili  x      /  AST  19     /  ALT  11     /  AlkPhos  76     07 May 2024 00:32          Blood Culture:

## 2024-05-09 NOTE — PROGRESS NOTE ADULT - PROBLEM SELECTOR PLAN 7
Home regimen: lantus 33u qhs w/ premeal sliding scale. A1c 7.4% in 3/2024.     - endo following, appreciate recs   - continue w/ lantus 34 units at bedtime, admelog 4 units pre-meal  - low dose premeal and bedtime sliding scale  - hypoglycemia protocol Likely in setting of deconditioning.     - leg compression, abdominal binder   - PT rec HONEY   - OOB as tolerated   - midodrine 30mg TID

## 2024-05-09 NOTE — PROGRESS NOTE ADULT - SUBJECTIVE AND OBJECTIVE BOX
*******************************  Cristina Ko MD (PGY-1)  Internal Medicine  Contact via [employer] TEAMS  *******************************    EVELYN LOPEZ  73y  Female    Patient is a 73y old  Female who presents with a chief complaint of Syncope (08 May 2024 21:04)      Subjective:    Objective:  T(C): 37.2 (05-09-24 @ 05:07), Max: 37.2 (05-09-24 @ 05:07)  HR: 101 (05-09-24 @ 05:07) (67 - 101)  BP: 111/65 (05-09-24 @ 05:07) (103/63 - 162/67)  RR: 18 (05-09-24 @ 05:07) (13 - 33)  SpO2: 96% (05-09-24 @ 05:07) (96% - 100%)  I&O's Summary    08 May 2024 07:01  -  09 May 2024 07:00  --------------------------------------------------------  IN: 1100 mL / OUT: 960 mL / NET: 140 mL        PHYSICAL EXAM:  GENERAL: NAD  HEAD:  Atraumatic, Normocephalic  EYES: EOMI, PERRLA, conjunctiva and sclera clear  ENMT: Moist mucous membranes  NECK: Supple, No JVD, trachea midline   NERVOUS SYSTEM:  Alert & Oriented X3, Good concentration; Motor Strength 5/5 B/L upper and lower extremities; DTRs 2+ intact and symmetric  CHEST/LUNG: Clear to auscultation bilaterally; No rales, rhonchi, wheezing, or rubs  HEART: Regular rate and rhythm; No murmurs, rubs, or gallops  ABDOMEN: Soft, Nontender, Nondistended; Bowel sounds present  EXTREMITIES:  2+ Peripheral Pulses, No clubbing, cyanosis, or edema  LYMPH: No lymphadenopathy noted  SKIN: No rashes or lesions    MEDICATIONS  (STANDING):  ammonium lactate 12% Lotion 1 Application(s) Topical <User Schedule>  aspirin  chewable 81 milliGRAM(s) Oral daily  atorvastatin 80 milliGRAM(s) Oral at bedtime  calamine/zinc oxide Lotion 1 Application(s) Topical three times a day  cefTRIAXone   IVPB 1000 milliGRAM(s) IV Intermittent every 24 hours  epoetin mendoza-epbx (RETACRIT) Injectable 34116 Unit(s) IV Push <User Schedule>  ferrous    sulfate 325 milliGRAM(s) Oral two times a day  heparin   Injectable 5000 Unit(s) SubCutaneous every 8 hours  insulin glargine Injectable (LANTUS) 24 Unit(s) SubCutaneous at bedtime  insulin lispro (ADMELOG) corrective regimen sliding scale   SubCutaneous at bedtime  insulin lispro (ADMELOG) corrective regimen sliding scale   SubCutaneous three times a day before meals  insulin lispro Injectable (ADMELOG) 4 Unit(s) SubCutaneous three times a day before meals  levothyroxine 75 MICROGram(s) Oral daily  midodrine 30 milliGRAM(s) Oral every 8 hours  Nephro-molly 1 Tablet(s) Oral daily  senna 2 Tablet(s) Oral at bedtime    MEDICATIONS  (PRN):  polyethylene glycol 3350 17 Gram(s) Oral two times a day PRN Constipation      LABS:        CAPILLARY BLOOD GLUCOSE      POCT Blood Glucose.: 216 mg/dL (08 May 2024 22:26)  POCT Blood Glucose.: 216 mg/dL (08 May 2024 15:58)  POCT Blood Glucose.: 87 mg/dL (08 May 2024 12:37)  POCT Blood Glucose.: 74 mg/dL (08 May 2024 12:18)  POCT Blood Glucose.: 68 mg/dL (08 May 2024 11:59)  POCT Blood Glucose.: 67 mg/dL (08 May 2024 11:56)  POCT Blood Glucose.: 136 mg/dL (08 May 2024 08:33)      RADIOLOGY & ADDITIONAL TESTS:               *******************************  Cristina Ko MD (PGY-1)  Internal Medicine  Contact via [employer] TEAMS  *******************************    EVELYN LOPEZ  73y  Female    Patient is a 73y old  Female who presents with a chief complaint of Syncope (08 May 2024 21:04)    Subjective:    Objective:  T(C): 37.2 (05-09-24 @ 05:07), Max: 37.2 (05-09-24 @ 05:07)  HR: 101 (05-09-24 @ 05:07) (67 - 101)  BP: 111/65 (05-09-24 @ 05:07) (103/63 - 162/67)  RR: 18 (05-09-24 @ 05:07) (13 - 33)  SpO2: 96% (05-09-24 @ 05:07) (96% - 100%)  I&O's Summary    08 May 2024 07:01  -  09 May 2024 07:00  --------------------------------------------------------  IN: 1100 mL / OUT: 960 mL / NET: 140 mL        PHYSICAL EXAM:  GENERAL: NAD  HEAD:  Atraumatic, Normocephalic  EYES: EOMI, PERRLA, conjunctiva and sclera clear  ENMT: Moist mucous membranes  NECK: Supple, No JVD, trachea midline   NERVOUS SYSTEM:  Alert & Oriented X3, Good concentration; Motor Strength 5/5 B/L upper and lower extremities; DTRs 2+ intact and symmetric  CHEST/LUNG: Clear to auscultation bilaterally; No rales, rhonchi, wheezing, or rubs  HEART: Regular rate and rhythm; No murmurs, rubs, or gallops  ABDOMEN: Soft, Nontender, Nondistended; Bowel sounds present  EXTREMITIES:  2+ Peripheral Pulses, No clubbing, cyanosis, or edema  LYMPH: No lymphadenopathy noted  SKIN: No rashes or lesions    MEDICATIONS  (STANDING):  ammonium lactate 12% Lotion 1 Application(s) Topical <User Schedule>  aspirin  chewable 81 milliGRAM(s) Oral daily  atorvastatin 80 milliGRAM(s) Oral at bedtime  calamine/zinc oxide Lotion 1 Application(s) Topical three times a day  cefTRIAXone   IVPB 1000 milliGRAM(s) IV Intermittent every 24 hours  epoetin mendoza-epbx (RETACRIT) Injectable 92005 Unit(s) IV Push <User Schedule>  ferrous    sulfate 325 milliGRAM(s) Oral two times a day  heparin   Injectable 5000 Unit(s) SubCutaneous every 8 hours  insulin glargine Injectable (LANTUS) 24 Unit(s) SubCutaneous at bedtime  insulin lispro (ADMELOG) corrective regimen sliding scale   SubCutaneous at bedtime  insulin lispro (ADMELOG) corrective regimen sliding scale   SubCutaneous three times a day before meals  insulin lispro Injectable (ADMELOG) 4 Unit(s) SubCutaneous three times a day before meals  levothyroxine 75 MICROGram(s) Oral daily  midodrine 30 milliGRAM(s) Oral every 8 hours  Nephro-molly 1 Tablet(s) Oral daily  senna 2 Tablet(s) Oral at bedtime    MEDICATIONS  (PRN):  polyethylene glycol 3350 17 Gram(s) Oral two times a day PRN Constipation      LABS:        CAPILLARY BLOOD GLUCOSE      POCT Blood Glucose.: 216 mg/dL (08 May 2024 22:26)  POCT Blood Glucose.: 216 mg/dL (08 May 2024 15:58)  POCT Blood Glucose.: 87 mg/dL (08 May 2024 12:37)  POCT Blood Glucose.: 74 mg/dL (08 May 2024 12:18)  POCT Blood Glucose.: 68 mg/dL (08 May 2024 11:59)  POCT Blood Glucose.: 67 mg/dL (08 May 2024 11:56)  POCT Blood Glucose.: 136 mg/dL (08 May 2024 08:33)      RADIOLOGY & ADDITIONAL TESTS:               *******************************  Cristina Ko MD (PGY-1)  Internal Medicine  Contact via [employer] TEAMS  *******************************    EVELYN LOPEZ  73y  Female    Patient is a 73y old  Female who presents with a chief complaint of Syncope (08 May 2024 21:04)    Subjective:    Objective:  T(C): 37.2 (05-09-24 @ 05:07), Max: 37.2 (05-09-24 @ 05:07)  HR: 101 (05-09-24 @ 05:07) (67 - 101)  BP: 111/65 (05-09-24 @ 05:07) (103/63 - 162/67)  RR: 18 (05-09-24 @ 05:07) (13 - 33)  SpO2: 96% (05-09-24 @ 05:07) (96% - 100%)  I&O's Summary    08 May 2024 07:01  -  09 May 2024 07:00  --------------------------------------------------------  IN: 1100 mL / OUT: 960 mL / NET: 140 mL    PHYSICAL EXAM:  GENERAL: NAD  HEAD:  Atraumatic, Normocephalic  EYES: EOMI, PERRLA, conjunctiva and sclera clear  ENMT: Moist mucous membranes  NECK: Supple, No JVD, trachea midline   NERVOUS SYSTEM:  Alert & Oriented X3, Good concentration; Motor Strength 5/5 B/L upper and lower extremities; DTRs 2+ intact and symmetric  CHEST/LUNG: Clear to auscultation bilaterally; No rales, rhonchi, wheezing, or rubs  HEART: Regular rate and rhythm; No murmurs, rubs, or gallops  ABDOMEN: Soft, Nontender, Nondistended; Bowel sounds present  EXTREMITIES:  2+ Peripheral Pulses, No clubbing, cyanosis, or edema  LYMPH: No lymphadenopathy noted  SKIN: No rashes or lesions    MEDICATIONS  (STANDING):  ammonium lactate 12% Lotion 1 Application(s) Topical <User Schedule>  aspirin  chewable 81 milliGRAM(s) Oral daily  atorvastatin 80 milliGRAM(s) Oral at bedtime  calamine/zinc oxide Lotion 1 Application(s) Topical three times a day  cefTRIAXone   IVPB 1000 milliGRAM(s) IV Intermittent every 24 hours  epoetin mendoza-epbx (RETACRIT) Injectable 83549 Unit(s) IV Push <User Schedule>  ferrous    sulfate 325 milliGRAM(s) Oral two times a day  heparin   Injectable 5000 Unit(s) SubCutaneous every 8 hours  insulin glargine Injectable (LANTUS) 24 Unit(s) SubCutaneous at bedtime  insulin lispro (ADMELOG) corrective regimen sliding scale   SubCutaneous at bedtime  insulin lispro (ADMELOG) corrective regimen sliding scale   SubCutaneous three times a day before meals  insulin lispro Injectable (ADMELOG) 4 Unit(s) SubCutaneous three times a day before meals  levothyroxine 75 MICROGram(s) Oral daily  midodrine 30 milliGRAM(s) Oral every 8 hours  Nephro-molly 1 Tablet(s) Oral daily  senna 2 Tablet(s) Oral at bedtime    MEDICATIONS  (PRN):  polyethylene glycol 3350 17 Gram(s) Oral two times a day PRN Constipation    LABS:  pending AM labs     CAPILLARY BLOOD GLUCOSE  POCT Blood Glucose.: 216 mg/dL (08 May 2024 22:26)  POCT Blood Glucose.: 216 mg/dL (08 May 2024 15:58)  POCT Blood Glucose.: 87 mg/dL (08 May 2024 12:37)  POCT Blood Glucose.: 74 mg/dL (08 May 2024 12:18)  POCT Blood Glucose.: 68 mg/dL (08 May 2024 11:59)  POCT Blood Glucose.: 67 mg/dL (08 May 2024 11:56)  POCT Blood Glucose.: 136 mg/dL (08 May 2024 08:33)    RADIOLOGY & ADDITIONAL TESTS:               *******************************  Cristina Ko MD (PGY-1)  Internal Medicine  Contact via [employer] TEAMS  *******************************    EVELYN LOPEZ  73y  Female    Patient is a 73y old  Female who presents with a chief complaint of Syncope (08 May 2024 21:04)    Subjective: Overnight, patient w/ nausea and vomiting. Patient continued to endorse n/v this morning. Denied any fever, chills, chest pain, sob, ab pain.     Objective:  T(C): 37.2 (05-09-24 @ 05:07), Max: 37.2 (05-09-24 @ 05:07)  HR: 101 (05-09-24 @ 05:07) (67 - 101)  BP: 111/65 (05-09-24 @ 05:07) (103/63 - 162/67)  RR: 18 (05-09-24 @ 05:07) (13 - 33)  SpO2: 96% (05-09-24 @ 05:07) (96% - 100%)  I&O's Summary    08 May 2024 07:01  -  09 May 2024 07:00  --------------------------------------------------------  IN: 1100 mL / OUT: 960 mL / NET: 140 mL    PHYSICAL EXAM:  GENERAL: NAD, chronically ill appearing   HEAD:  Atraumatic, Normocephalic  EYES: EOMI, PERRLA, conjunctiva and sclera clear  ENMT: Moist mucous membranes  NECK: Supple, No JVD, trachea midline   NERVOUS SYSTEM:  Alert & Oriented X3, Good concentration  CHEST/LUNG: Clear to auscultation bilaterally on anterior exam; No rales, rhonchi, wheezing, or rubs  HEART: Regular rate and rhythm; No murmurs, rubs, or gallops  ABDOMEN: Soft, Nontender, Nondistended; Bowel sounds present  EXTREMITIES:  2+ Peripheral Pulses, ACE wraps around b/l LE   SKIN: diffuse erythema w/ skin sloughing, nonpruritic     MEDICATIONS  (STANDING):  ammonium lactate 12% Lotion 1 Application(s) Topical <User Schedule>  aspirin  chewable 81 milliGRAM(s) Oral daily  atorvastatin 80 milliGRAM(s) Oral at bedtime  calamine/zinc oxide Lotion 1 Application(s) Topical three times a day  cefTRIAXone   IVPB 1000 milliGRAM(s) IV Intermittent every 24 hours  epoetin mendoza-epbx (RETACRIT) Injectable 75013 Unit(s) IV Push <User Schedule>  ferrous    sulfate 325 milliGRAM(s) Oral two times a day  heparin   Injectable 5000 Unit(s) SubCutaneous every 8 hours  insulin glargine Injectable (LANTUS) 24 Unit(s) SubCutaneous at bedtime  insulin lispro (ADMELOG) corrective regimen sliding scale   SubCutaneous at bedtime  insulin lispro (ADMELOG) corrective regimen sliding scale   SubCutaneous three times a day before meals  insulin lispro Injectable (ADMELOG) 4 Unit(s) SubCutaneous three times a day before meals  levothyroxine 75 MICROGram(s) Oral daily  midodrine 30 milliGRAM(s) Oral every 8 hours  Nephro-molly 1 Tablet(s) Oral daily  senna 2 Tablet(s) Oral at bedtime    MEDICATIONS  (PRN):  polyethylene glycol 3350 17 Gram(s) Oral two times a day PRN Constipation    LABS:             9.9    10.42 )-----------( 256      ( 09 May 2024 10:10 )             33.1     05-09    134<L>  |  90<L>  |  68<H>  ----------------------------<  83  4.2   |  28  |  3.57<H>    Ca    9.1      09 May 2024 10:10  Phos  5.6     05-09  Mg     2.1     05-09    TPro  6.9  /  Alb  3.2<L>  /  TBili  0.6  /  DBili  x   /  AST  23  /  ALT  8<L>  /  AlkPhos  89  05-09        Culture Results:   No growth at 5 days (04-27 @ 12:20)  Culture Results:   No growth at 5 days (04-27 @ 12:19)  Culture Results:   No growth at 5 days (04-25 @ 10:15)  Culture Results:   Growth in aerobic bottle: Staphylococcus epidermidis  Isolation of Coagulase negative Staphylococcus from single blood culture  sets may represent  contamination. Contact the Microbiology Department at [phone] if  susceptibility testing is  clinically indicated.  Direct identification is available within approximately 3-5  hours either by Blood Panel Multiplexed PCR or Direct  MALDI-TOF. Details: [email or web address] (04-25 @ 10:15)  Culture Results:   Rare Yeast (04-16 @ 19:11)  Culture Results:   10,000 - 49,000 CFU/mL Escherichia coli (04-16 @ 14:57)  Culture Results:   No growth at 5 days (04-16 @ 00:28)  Culture Results:   No growth at 5 days (04-16 @ 00:20)    CAPILLARY BLOOD GLUCOSE  POCT Blood Glucose.: 99 mg/dL (09 May 2024 13:18)  POCT Blood Glucose.: 216 mg/dL (08 May 2024 22:26)  POCT Blood Glucose.: 216 mg/dL (08 May 2024 15:58)  POCT Blood Glucose.: 87 mg/dL (08 May 2024 12:37)  POCT Blood Glucose.: 74 mg/dL (08 May 2024 12:18)  POCT Blood Glucose.: 68 mg/dL (08 May 2024 11:59)  POCT Blood Glucose.: 67 mg/dL (08 May 2024 11:56)  POCT Blood Glucose.: 136 mg/dL (08 May 2024 08:33)    RADIOLOGY & ADDITIONAL TESTS:

## 2024-05-09 NOTE — PROGRESS NOTE ADULT - PROBLEM SELECTOR PLAN 2
DUNCAN secondary to ATN from hypotension and contrast nephropathy. CKD stage 4. s/p CRRT and bumex drip.       - strict I's and O's  - renally dose meds, avoid nephrotoxic agents EF EF from 4/2024 w/ EF 25%, global LV hypokinesis, moderately dilated LV ca< from: TTE W or WO Ultrasound Enhancing Agent (04.25.24 @ 09:27) >    1. Left ventricular cavity is moderately dilated. Left ventricular systolic function is severely decreased with an ejection fraction of 25 % by Nunes's method of disks. Global left ventricular hypokinesis.   2. There is no evidence of a left ventricular thrombus.   3. There is moderate (grade 2) left ventricular diastolic dysfunction, withelevated filling pressure.   4. There is calcification of the mitral valve annulus.   5. A Benitez Bakari (TAVR) valve replacement is present in the aortic position The prosthetic valve is well seated. No paravalvular regurgitation.   6. Moderately enlarged right ventricular cavity size, with normal wall thickness, and normal systolic function.   7. The right atrium is moderately dilated.   8. Left pleural effusion noted.   9. No pericardial effusion seen.  10. Compared to the transthoracic echocardiogram performed on 4/24/2024, there have been no significant interval changes.    < end of copied text > DUNCAN secondary to ATN from hypotension and contrast nephropathy. CKD stage 4. s/p CRRT and bumex drip in CICU.   Bun 64->68, Cr 3.08-> 3.57. Suspect bump in Cr likely in setting of dehydration, poor PO intake given n/v.     - f/u nephro recs   - strict I's and O's  - renally dose meds, avoid nephrotoxic agents

## 2024-05-09 NOTE — PROGRESS NOTE ADULT - ASSESSMENT
A/P: 73F PMH: HFrEF (EF 30%), AS, LBBB, HTN, DM1, CKD, hypothyroidism, chronic lymphedema, LELIA (3L home O2) p/w for syncope 2/2 severe AS, s/p TAVR 4/24. Course complicated by contrast induced allergic reaction (had CTA for TAVR eval) and contrast related renal failure (acute on chronic) requiring HD. TAVR c/b VT and vfib requiring shock and flash pulmonary edema causing AHRF requiring intubation. Currently extubated,  on CVVPHD for fluid removal, and on pressor support for vasoplegia and hypovolemia due to CRRT.      NEURO:  - Extubated 4/26  - Currently AOx4  - OOBTC, c/w PT as tolerated  - PT recommends HONEY for eventual dispo    PULM  # AHRF 2/2 flash pulmonary edema  # LELIA (on 3L home oxygen)  - Extubated 4/26  - satting well on 2 L NC  - 4/27 CXR - no consolidation or pleural effusion    CV  #Shock, Vasoplegic  -  vasoplegia from sedation vs losing fluid from CVVHD vs distributive septic shock  - off IV pressors   - c/w Midodrine 30 TID   - endo ruling out Cushing's    #VT/vfib s/p shock  #2:1 AV block  #Severe AS s/p TAVR  - Known LBBB with first degree AV block on telemetry  - s/p Micra 4/25, no longer requiring TVP (eventual candidate for CRT per EP)  - changes noted on telemetry requiring EP f/u and possible interrogation of device    # HFrEF  - EF 25%  - Holding GDMT while on pressors    #Orthostatic hypotension  Pt with significant drops in blood pressure upon standing  - c/w midodrine 30 mg TID  - maintain hydration  - c/w leg compression  - Abdominal binder   - slowly increase activity     /RENAL  #CKD  - stable off IV pressors  - Strict I/Os  - Trend BMP, lytes  - Avoid nephrotoxins  - Nephro following  - c/w midodrine support 30 mg q8h  - UOP about 40-50 ml/hr off bumex, ctm      GI  - tolerating  diet    ENDO  # DM1  - on  basal/bolus lantus 34 QHS, admelog dec to 8u TIDAC   - Pt with hypoglycemia this morning, will adjust insulin  - monitor POCT FS, goal 140-180    # Hypothyroidism  - c/w home levothyroxine 75mcg    #Cushing  # L adrenal nodule  -dexamethasone suppression test w/ am cortisol x2 completed, pt is pos for Cushing's disease, f/u outpt, recheck once critical illness resolves  -f/u [place] salivary cortisol  - CT A/P demonstrating normal adrenals    SKIN  # Acute generalized exanthematous pustulosis   - c/w calamine,  Vaseline  - wound care consulted, appreciate recs    # Psoriasis   # Lymphedema   # R. Diabetic foot ulcer  - Elevate legs  - Compression stockings, ACE wrapping  - Podiatry following- foot wounds management, decubiti precautions f/u outpt, check for iodine gel allergy    ID  #leukocytosis  - afebrile, bcx neg  - Monitor WBC and for fevers, and signs of infection    # Oral Candidiasis  - c/w nystatin     Heme  #Anemia, worsening  -AARTI vs AOCD vs anemia 2/2 blood loss vs myelosuppression iso critical illness  -trend H&H, transfuse PRN    #thrombocytopenia  - myelosuppression iso critical illness  - improved This is a 74 y/o F with PMHx HFrEF (EF 30%), AS, LBBB, HTN, DM1, CKD, hypothyroidism, chronic lymphedema, LELIA (3L home O2) who presented for syncope 2/2 severe AS, now s/p TAVR 4/24 c/b by VT and Vfib requiring shock and flash pulmonary edema requiring intubation. Course complicated by contrast induced allergic reaction (had CTA for TAVR eval) and contrast related renal failure (acute on chronic) requiring CRRT for fluid removal and pressors for vasoplegia and hypovolemia due to CRRT. Patient now extubated, off pressors, CRRT and downgraded to medicine floors.

## 2024-05-09 NOTE — PROGRESS NOTE ADULT - PROBLEM SELECTOR PLAN 6
Home regimen: lantus 33u qhs w/ premeal sliding scale. A1c 7.4% in 3/2024.     - endo following, appreciate recs   - continue w/ lantus 34 units at bedtime, admelog 4 units pre-meal  - low dose premeal and bedtime sliding scale  - hold premeal if NPO   - hypoglycemia protocol

## 2024-05-09 NOTE — PROGRESS NOTE ADULT - PROBLEM SELECTOR PLAN 10
Incidental finding on CT C/A/P w/ thickened L adrenal gland and 1.2 x 0.8 cm L adrenal nodule.   Two positive DST, concerning for cushing syndrome. Endo following.     - no indication for txt at this time  - f/u outpatient w/ Dr. Maynor Dumont for repeat testing Chronic lymphedema w/ wounds.     - ctm  - elevate legs as tolerated, encourage ambulation  - compression stockings, ace wrapping  - f/u wound care recs

## 2024-05-09 NOTE — PROGRESS NOTE ADULT - NSPROGADDITIONALINFOA_GEN_ALL_CORE
Contact via [employer] Teams during business hours  To reach covering provider access AMION via sunrise tools  For Urgent matters/after-hours/weekends/holidays please page endocrine fellow on call   For nonurgent matters please email [email or web address]    Please note that this patient may be followed by different provider tomorrow.  Notify endocrine 24 hours prior to discharge for final recommendations

## 2024-05-09 NOTE — PROGRESS NOTE ADULT - PROBLEM SELECTOR PLAN 3
DUNCAN secondary to ATN from hypotension and contrast nephropathy. CKD stage 4. s/p CRRT and bumex drip.       - strict I's and O's  - renally dose meds, avoid nephrotoxic agents Presented for syncope secondary to severe AS, s/p TAVR on 4/24 c/b by VT -> shock -> VFib -> shock.  Course complicated w/ symptomatic bradycardia, requiring TVP, now s/p micra PPM.     - continue aspirin

## 2024-05-09 NOTE — PROGRESS NOTE ADULT - NSPROGADDITIONALINFOA_GEN_ALL_CORE
#Anemia #Anemia  Likely in setting of CKD vs myelosuppresion in setting of acute illnesses. No obvious source of bleed. Stable.   - daily CBC  - transfuse for goal Hgb >7  - EPO 3x/week     DVT ppx: SQH   Diet: CC   Dispo: pending clinical course, PT rec HONEY DVT ppx: SQH   Diet: CC   Dispo: pending clinical course, PT rec HONEY

## 2024-05-09 NOTE — PROGRESS NOTE ADULT - PROBLEM SELECTOR PLAN 8
TSH mildly elevated 4.79 - 5.06, FT4 1.3-1.5    - c/w home levothyroxine 75mcg qd   - Recheck TSH and FT4 outpatient outpatient

## 2024-05-09 NOTE — PROGRESS NOTE ADULT - ASSESSMENT
73F w HFrEF (EF 30%), mod AS, known LBBB, HTN, IDDM1, CKD, hypothyroidism, chronic lymphedema, suspected OHS/LELIA on 3L NC home O2 p/w 1 episode of syncope. Recent admission at [facility] (3/29-4/5) for ADHF and DUNCAN s/p diuresis, discharged with new home O2 3L NC suspecting OHS/LELIA pending outpatient w/u. Since discharge a week ago, she has been staying at home with   Pt had sob walking to car. Once in car, she was still breathing heavily. Partner noticed pt was not responding to verbal stimuli for 10-15sec and witnessed R arm jerking. Pt gained consciousness quickly without postictal symptoms. Pt went to Cardiologist Dr. Nathan who recommended pt to come to ED. No fever, cp, abd pain, n/v. Leg swelling is improved from prior. Pt on torsemide 40mg which she has been taking. Pt was discharged on 3LNC which she is currently on. Patient reports she did not take Farxiga that she was discharged on as she was concerned about side effects.     In ED, patient was found afebrile, hemodynamically stable, breathing well on 3L NC. Initial labs notable for mild hyponatremia, DUNCAN on CKD, elevated proBNP and elevated pCO2 both improved from prior.  pt also noticed with abn creatinine      1- CKD IV  with DUNCAN   2- CHF   3- lymphedema   4- severe AS  s/p tavr 4/24  5- hypotension         suspect ATN from hypotension along with contrast nephropathy   creatinine worsening requiring renal replacement therapy, HD initiated 4/18  now off CRRT  fluid status improving, now with higher creatinine today  given decreased po intake, uti, and N/V will give IVF hydration  NS @ 75 ml/hr x 12 hours  anemia, trend hgb  retacrit 10,000 units weekly  has + uo is non oliguric   hardy removed, TOV  ceftriaxone 1G daily for UTI  midodrine 30 mg tid  trend bp  strict I/O   trend creatinine and electrolytes daily 73F w HFrEF (EF 30%), mod AS, known LBBB, HTN, IDDM1, CKD, hypothyroidism, chronic lymphedema, suspected OHS/LELIA on 3L NC home O2 p/w 1 episode of syncope. Recent admission at [facility] (3/29-4/5) for ADHF and DUNCAN s/p diuresis, discharged with new home O2 3L NC suspecting OHS/LELIA pending outpatient w/u. Since discharge a week ago, she has been staying at home with   Pt had sob walking to car. Once in car, she was still breathing heavily. Partner noticed pt was not responding to verbal stimuli for 10-15sec and witnessed R arm jerking. Pt gained consciousness quickly without postictal symptoms. Pt went to Cardiologist Dr. Nathan who recommended pt to come to ED. No fever, cp, abd pain, n/v. Leg swelling is improved from prior. Pt on torsemide 40mg which she has been taking. Pt was discharged on 3LNC which she is currently on. Patient reports she did not take Farxiga that she was discharged on as she was concerned about side effects.     In ED, patient was found afebrile, hemodynamically stable, breathing well on 3L NC. Initial labs notable for mild hyponatremia, DUNCAN on CKD, elevated proBNP and elevated pCO2 both improved from prior.  pt also noticed with abn creatinine      1- CKD IV  with DUNCAN   2- CHF   3- lymphedema   4- severe AS  s/p tavr 4/24  5- hypotension           given decreased po intake, uti, and N/V will give IVF hydration  NS @ 75 ml/hr x 12 hours  anemia, trend hgb  retacrit 10,000 units weekly  has + uo is non oliguric   hardy removed, TOV  ceftriaxone 1G daily for UTI  midodrine 30 mg tid  trend bp  strict I/O   trend creatinine and electrolytes daily

## 2024-05-09 NOTE — PROGRESS NOTE ADULT - SUBJECTIVE AND OBJECTIVE BOX
[facility]   [phone]  RENAL FOLLOW UP NOTE  --------------------------------------------------------------------------------  Chief Complaint:    24 hour events/subjective:    patient seen and examined.   n/v overnight  decreased po intake    PAST HISTORY  --------------------------------------------------------------------------------  No significant changes to PMH, PSH, FHx, SHx, unless otherwise noted    ALLERGIES & MEDICATIONS  --------------------------------------------------------------------------------  Allergies    contrast media (iodine-based) (Fever; Vomiting; Flushing; Hypotension; Rash; Stomach Upset)  Ativan (Rash; Urticaria)  penicillins (Hives (Mod to Severe); Anaphylaxis (Mod to Severe); Short breath (Mod to Severe); Angioedema (Mod to Severe))    Intolerances      Standing Inpatient Medications  ammonium lactate 12% Lotion 1 Application(s) Topical <User Schedule>  aspirin  chewable 81 milliGRAM(s) Oral daily  atorvastatin 80 milliGRAM(s) Oral at bedtime  calamine/zinc oxide Lotion 1 Application(s) Topical three times a day  cefTRIAXone   IVPB 1000 milliGRAM(s) IV Intermittent every 24 hours  epoetin mendoza-epbx (RETACRIT) Injectable 89844 Unit(s) IV Push <User Schedule>  ferrous    sulfate 325 milliGRAM(s) Oral two times a day  heparin   Injectable 5000 Unit(s) SubCutaneous every 8 hours  insulin glargine Injectable (LANTUS) 18 Unit(s) SubCutaneous at bedtime  insulin lispro (ADMELOG) corrective regimen sliding scale   SubCutaneous at bedtime  insulin lispro (ADMELOG) corrective regimen sliding scale   SubCutaneous three times a day before meals  insulin lispro Injectable (ADMELOG) 3 Unit(s) SubCutaneous three times a day before meals  levothyroxine 75 MICROGram(s) Oral daily  midodrine 30 milliGRAM(s) Oral every 8 hours  Nephro-molly 1 Tablet(s) Oral daily  senna 2 Tablet(s) Oral at bedtime  sodium chloride 0.9%. 500 milliLiter(s) IV Continuous <Continuous>    PRN Inpatient Medications  polyethylene glycol 3350 17 Gram(s) Oral two times a day PRN      REVIEW OF SYSTEMS  --------------------------------------------------------------------------------    Gen: denies fevers/chills,  CVS: denies chest pain/palpitations  Resp: denies SOB/Cough  GI: +N+/V/ denies Abd pain  : Denies dysuria    VITALS/PHYSICAL EXAM  --------------------------------------------------------------------------------  T(C): 36.8 (05-09-24 @ 11:46), Max: 37.2 (05-09-24 @ 05:07)  HR: 87 (05-09-24 @ 11:46) (71 - 101)  BP: 95/56 (05-09-24 @ 11:46) (95/56 - 126/63)  RR: 18 (05-09-24 @ 11:46) (18 - 33)  SpO2: 98% (05-09-24 @ 11:46) (96% - 100%)  Wt(kg): --        05-08-24 @ 07:01  -  05-09-24 @ 07:00  --------------------------------------------------------  IN: 1100 mL / OUT: 960 mL / NET: 140 mL    05-09-24 @ 07:01  -  05-09-24 @ 16:22  --------------------------------------------------------  IN: 240 mL / OUT: 200 mL / NET: 40 mL      Physical Exam:  	              Gen:  on RA, comfortable appearing   	Pulm: decrease bs  no rales or ronchi or wheezing  	CV: No JVD. RRR, S1S2; no rub II/VI M   	Abd: +BS, soft, nontender/nondistended, obese   	UE: Warm, no cyanosis  no clubbing, no edema  	LE: Warm, no cyanosis  no clubbing, 1+  softer  edema, B/L ACE bandage  	Neuro: alert and oriented     LABS/STUDIES  --------------------------------------------------------------------------------              9.9    10.42 >-----------<  256      [05-09-24 @ 10:10]              33.1     134  |  90  |  68  ----------------------------<  83      [05-09-24 @ 10:10]  4.2   |  28  |  3.57        Ca     9.1     [05-09-24 @ 10:10]      Mg     2.1     [05-09-24 @ 10:10]      Phos  5.6     [05-09-24 @ 10:10]    TPro  6.9  /  Alb  3.2  /  TBili  0.6  /  DBili  x   /  AST  23  /  ALT  8   /  AlkPhos  89  [05-09-24 @ 10:10]          Creatinine Trend:  SCr 3.57 [05-09 @ 10:10]  SCr 3.08 [05-08 @ 00:26]  SCr 3.02 [05-07 @ 00:32]  SCr 3.25 [05-06 @ 01:53]  SCr 3.10 [05-05 @ 19:20]          Urinalysis (05.08.24 @ 16:07)   pH Urine: 5.5  Blood, Urine: Small  Glucose Qualitative, Urine: Negative mg/dL  Color: Yellow  Urine Appearance: Turbid  Bilirubin: Negative  Ketone - Urine: Negative mg/dL  Specific Gravity: 1.009  Protein, Urine: 30 mg/dL  Urobilinogen: 0.2 mg/dL  Nitrite: Negative  Leukocyte Esterase Concentration: Large  Urine Microscopic-Add On (NC) (05.08.24 @ 16:07)   Bacteria: Moderate /HPF  WBC Clumps: Present  Epithelial Cells: 22 /HPF  Review: Reviewed  Cast: 23 /LPF  Yeast-like Cells: Present  Red Blood Cell - Urine: 5 /HPF  White Blood Cell - Urine: 501 /HPF  Hyaline Casts: Present    PTH -- (Ca 8.4)      [04-19-24 @ 17:54]   109  TSH 5.06      [04-14-24 @ 07:18]  Lipid: chol 74, TG 70, HDL 33, LDL --      [04-13-24 @ 07:05]

## 2024-05-09 NOTE — PROGRESS NOTE ADULT - PROBLEM SELECTOR PLAN 11
Chronic     - ctm  - elevate legs as tolerated, encourage ambulation  - compression stockings, ace wrapping  - f/u wound care recs Chronic lymphedema w/ wounds.     - ctm  - elevate legs as tolerated, encourage ambulation  - compression stockings, ace wrapping  - f/u wound care recs on 3L NC at home. Not on CPAP. Hospital course complicated w/ AHRF 2/2 to flash pulm edema requiring intubation. Now extubated and stable on NC    - continue to monitor

## 2024-05-09 NOTE — PROGRESS NOTE ADULT - PROBLEM SELECTOR PLAN 12
on 3L NC at home. on 3L NC at home. Not on CPAP. Likely in setting of CKD vs myelosuppresion in setting of acute illnesses. No obvious source of bleed. Hgb Stable.     - daily CBC  - transfuse for goal Hgb >7  - EPO 3x/week

## 2024-05-09 NOTE — PROGRESS NOTE ADULT - ASSESSMENT
73F w/h/o of T1DM with unknown control due to CKD and anemia of chronic disease. DM c/b CKD. Also h/o HFrEF (EF 30%), mod AS, known LBBB, HTN,  hypothyroidism, chronic lymphedema, suspected OHS/LELIA on 3L NC home O2 p/w 1 episode of syncope with R arm jerking, likely 2/2 severe symptomatic AS. S/p AVR 4/24 c/b DUNCAN on CKD, fever and hypotension. Also  UTI/pul edema/ sepsis and L arenal nodule finding.     Endocrine consult done for DM and adrenal nodule  management. Today patient has UTI with accompanying Nausea and vomiting. Diet changed to clear liquid CCD. will adjust lantus dose today.  BG goal 100 to 180s.   Per endocrine attending Dr Burton> Adrenal nodule work up completed and pt will need to f/u once she is more stable as out pt.       Home regimen: Lantus 33 units qhs, Novolog based on sliding scale TIDAC normally 3-5 units  Has Dexcom G7

## 2024-05-09 NOTE — PROGRESS NOTE ADULT - PROBLEM SELECTOR PLAN 4
EF from 4/2024 w/ EF 25%, global LV hypokinesis, moderately dilated LV, grade 2 LV diastolic dysfunction, RA moderately dilated. Home meds: torsemide 20mg qd, eplerenone 25mg qd, toprol XL 100MG qd    - cards following   - wean midodrine as tolerated   - hold home meds, restart GDMT as tolerated  - f/u w/ EP outpatient for CRT

## 2024-05-09 NOTE — PROGRESS NOTE ADULT - PROBLEM SELECTOR PLAN 9
Likely delayed contrast reaction vs pantoprazole. Derm biopsy w/     - continue to monitor  - nystatin cream BID w/ triamcinolone 0.1% cream BID to intertriginous areas  - triamcinolone 0.1% ointment BID to affected areas for rest of body - 2 weeks on, 1 week off Incidental finding on CT C/A/P w/ thickened L adrenal gland and 1.2 x 0.8 cm L adrenal nodule.   Two positive DST, concerning for cushing syndrome. Endo following.     - no indication for txt at this time  - f/u outpatient w/ Dr. Maynor Dumont for repeat testing

## 2024-05-09 NOTE — PROGRESS NOTE ADULT - PROBLEM SELECTOR PLAN 1
-Test BG ac and hs  - Decrease Lantus to 18 units  qhs   - c/w Admelog 3 units TIDAC   - C/w Low dose admelog correction scale TIDAC, Low dose admelog correction scale QHS    Discharge:  Will be determined according to BG/insulin needs/PO intake  at time of discharge. Basal/bolus insulin doses TBD  - Of note, patient instructed on discharge from recent hospitalization at [facility] to start farxiga for CHF. Given risks of DKA of SGLT2i in T1DM, would not start until better data is available for its benefits.  - Follow up with Dr. Luis Dumont outpatient  -Pt will likey required rehab  F/u with optho/renal/cardiac as out pt

## 2024-05-09 NOTE — PROGRESS NOTE ADULT - PROBLEM SELECTOR PLAN 5
Likely delayed contrast reaction vs pantoprazole. Derm biopsy consistent w/ agep. s/p nystatin and triamcinolone cream to affected debi.   Improving.     - continue to monitor  - calamine cream, vaseline

## 2024-05-09 NOTE — PROGRESS NOTE ADULT - SUBJECTIVE AND OBJECTIVE BOX
seen earlier today     Chief Complaint: Diabetes Mellitus follow up    INTERVAL HX:  Patient seen earlier today. She is not feeling well and has been having nausea and vomiting. Has received zofran. Diet changed to clear liquids.   BG over last 24 hours have been variable with one episode of hypoglycemia yesterday at lunchtime.     Review of Systems:  General: As above  GI: No nausea, vomiting  Endocrine: no  S&Sx of hypoglycemia    Allergies    contrast media (iodine-based) (Fever; Vomiting; Flushing; Hypotension; Rash; Stomach Upset)  Ativan (Rash; Urticaria)  penicillins (Hives (Mod to Severe); Anaphylaxis (Mod to Severe); Short breath (Mod to Severe); Angioedema (Mod to Severe))    Intolerances      MEDICATIONS  (STANDING):  ammonium lactate 12% Lotion 1 Application(s) Topical <User Schedule>  aspirin  chewable 81 milliGRAM(s) Oral daily  atorvastatin 80 milliGRAM(s) Oral at bedtime  calamine/zinc oxide Lotion 1 Application(s) Topical three times a day  cefTRIAXone   IVPB 1000 milliGRAM(s) IV Intermittent every 24 hours  epoetin mendoza-epbx (RETACRIT) Injectable 82436 Unit(s) IV Push <User Schedule>  ferrous    sulfate 325 milliGRAM(s) Oral two times a day  heparin   Injectable 5000 Unit(s) SubCutaneous every 8 hours  insulin glargine Injectable (LANTUS) 24 Unit(s) SubCutaneous at bedtime  insulin lispro (ADMELOG) corrective regimen sliding scale   SubCutaneous at bedtime  insulin lispro (ADMELOG) corrective regimen sliding scale   SubCutaneous three times a day before meals  insulin lispro Injectable (ADMELOG) 4 Unit(s) SubCutaneous three times a day before meals  levothyroxine 75 MICROGram(s) Oral daily  midodrine 30 milliGRAM(s) Oral every 8 hours  Nephro-molly 1 Tablet(s) Oral daily  senna 2 Tablet(s) Oral at bedtime  sodium chloride 0.9%. 500 milliLiter(s) (75 mL/Hr) IV Continuous <Continuous>      atorvastatin   80 milliGRAM(s) Oral (05-08-24 @ 22:46)    insulin glargine Injectable (LANTUS)   24 Unit(s) SubCutaneous (05-08-24 @ 22:46)    insulin lispro (ADMELOG) corrective regimen sliding scale   2 Unit(s) SubCutaneous (05-08-24 @ 17:00)    insulin lispro Injectable (ADMELOG)   4 Unit(s) SubCutaneous (05-08-24 @ 17:00)        PHYSICAL EXAM:  VITALS: T(C): 36.8 (05-09-24 @ 11:46)  T(F): 98.3 (05-09-24 @ 11:46), Max: 99 (05-09-24 @ 05:07)  HR: 87 (05-09-24 @ 11:46) (69 - 101)  BP: 95/56 (05-09-24 @ 11:46) (95/56 - 142/67)  RR:  (13 - 33)  SpO2:  (96% - 100%)  Wt(kg): --  GENERAL: NAD  Respiratory: Respirations unlabored   Extremities: Warm, generalized edema, skin breakdown  NEURO: Alert , appropriate     LABS:  POCT Blood Glucose.: 99 mg/dL (05-09-24 @ 13:18)  POCT Blood Glucose.: 106 mg/dL (05-09-24 @ 08:22)  POCT Blood Glucose.: 216 mg/dL (05-08-24 @ 22:26)  POCT Blood Glucose.: 216 mg/dL (05-08-24 @ 15:58)  POCT Blood Glucose.: 87 mg/dL (05-08-24 @ 12:37)  POCT Blood Glucose.: 74 mg/dL (05-08-24 @ 12:18)  POCT Blood Glucose.: 68 mg/dL (05-08-24 @ 11:59)  POCT Blood Glucose.: 67 mg/dL (05-08-24 @ 11:56)  POCT Blood Glucose.: 136 mg/dL (05-08-24 @ 08:33)  POCT Blood Glucose.: 170 mg/dL (05-07-24 @ 22:10)  POCT Blood Glucose.: 242 mg/dL (05-07-24 @ 17:04)  POCT Blood Glucose.: 183 mg/dL (05-07-24 @ 12:06)  POCT Blood Glucose.: 187 mg/dL (05-07-24 @ 07:37)  POCT Blood Glucose.: 140 mg/dL (05-06-24 @ 22:24)  POCT Blood Glucose.: 92 mg/dL (05-06-24 @ 16:37)                          9.9    10.42 )-----------( 256      ( 09 May 2024 10:10 )             33.1     05-09    134<L>  |  90<L>  |  68<H>  ----------------------------<  83  4.2   |  28  |  3.57<H>    Ca    9.1      09 May 2024 10:10  Phos  5.6     05-09  Mg     2.1     05-09    TPro  6.9  /  Alb  3.2<L>  /  TBili  0.6  /  DBili  x   /  AST  23  /  ALT  8<L>  /  AlkPhos  89  05-09    eGFR: 13 mL/min/1.73m2 (09 May 2024 10:10)    04-13 Chol 74 Direct LDL -- LDL calculated 25 HDL 33<L> Trig 70  Thyroid Function Tests:  04-14 @ 07:18 TSH 5.06 FreeT4 1.3 T3 -- Anti TPO -- Anti Thyroglobulin Ab -- TSI --  04-13 @ 07:05 TSH 4.79 FreeT4 1.5 T3 -- Anti TPO -- Anti Thyroglobulin Ab -- TSI --      A1C with Estimated Average Glucose Result: 7.4 % (03-30-24 @ 08:21)    Estimated Average Glucose: 166 mg/dL (03-30-24 @ 08:21)        Diet, Clear Liquid:   Consistent Carbohydrate Evening Snack (CSTCHOSN) (05-09-24 @ 11:44) [Active]

## 2024-05-09 NOTE — PROGRESS NOTE ADULT - ASSESSMENT
73F w HFrEF (EF 30%), mod AS, known LBBB, HTN, IDDM1, CKD, hypothyroidism, chronic lymphedema, p/w 1 episode of syncope with R arm jerking.     #Syncope-Aortic Stenosis  - Known Aortic Stenosis likely Severe in setting of decreased LVEF  - s/p tavr on 4/24 c/b VT -> shock -> VFib -> shock  - hypoxic/congested, and was intubated, now extubated on 4/25 in the evening, on NC   - on 4/25 with worsening bradycardia, and now s/p TVP placement followed by AV Micra placement with removal of TVP  - Remains in Sinus Rhythm/known lbbb with intermittent   - Vaso has been weaned off with the uptitration of midodrine  - On Midodrine 30mg Q8  - to consider CRT-D in the future  - Cannot initiate GDMT due to blood pressures  - would try to mobilize as best as possible.   - cont asa and statin    #Chronic Systolic HF (EF 30%), mod to severe AS, HTN, LBBB, Lymphedema   - Has known systolic HF and AS.    - Repeat TTE showed EF 30%, mild-mod LVH, mildly reduced RVF, mod-severe AS (.61 cmsq), mod pHTN  - On home Torsemide 20 mg daily, Eplerenone 25 mg daily, and Toprol  mg daily, all currently on hold  - initially CVVHD, then HD  - HD now on hold, as she is not oliguric  - prn iv bumex  - renal fu    - nausea/vomiting overnight, in setting of uti, now on abx  - will follow with you

## 2024-05-09 NOTE — PROGRESS NOTE ADULT - PROBLEM SELECTOR PLAN 1
s/p TAVR 4/24 Presented for syncope secondary to severe AS, s/p TAVR on 4/24 c/b by VT and Vfib     - Presented for syncope secondary to severe AS, s/p TAVR on 4/24 c/b by VT and Vfib     - continue aspirin   -     - U/A w/ large LE, 501 WBC, 5 RBC and mod bacteria. Previous urine cx w/ ecoli, pansensitive.     - continue ceftriaxone (5/8- )   - f/u urine culture   - d/c hardy

## 2024-05-09 NOTE — PROGRESS NOTE ADULT - ATTENDING COMMENTS
73F PMH: HFrEF (EF 30%), AS, LBBB, HTN, DM1, CKD, hypothyroidism, chronic lymphedema, LELIA (3L home O2) p/w for syncope 2/2 severe AS, s/p TAVR 4/24. Course complicated by contrast induced allergic reaction (had CTA for TAVR eval) and contrast related renal failure (acute on chronic) requiring HD. TAVR c/b VT and vfib requiring shock and flash pulmonary edema causing AHRF requiring intubation. Currently extubated,  on CVVPHD for fluid removal, and on pressor support for vasoplegia and hypovolemia due to CRRT.    Seen and examined at bedside. Nauseated - vomitus is greenish brown.  Zofran given x 2.   Diffuse dry rash.   Lungs clear on exam.  Well appearing all things considered.   Taper Midodrine.   F/u Renal re: BUN/Cr, BUN rising.

## 2024-05-09 NOTE — PROGRESS NOTE ADULT - NUTRITIONAL ASSESSMENT
This patient has been assessed with a concern for Malnutrition and has been determined to have a diagnosis/diagnoses of Morbid obesity (BMI > 40).    This patient is being managed with:   Diet Consistent Carbohydrate w/Evening Snack-  No Concentrated Potassium  Low Sodium  Entered: May  8 2024  5:31PM

## 2024-05-10 NOTE — PROGRESS NOTE ADULT - PROBLEM SELECTOR PLAN 4
EF from 4/2024 w/ EF 25%, global LV hypokinesis, moderately dilated LV, grade 2 LV diastolic dysfunction, RA moderately dilated. Home meds: torsemide 20mg qd, eplerenone 25mg qd, toprol XL 100MG qd    - cards following   - wean midodrine as tolerated   - hold home meds, restart GDMT as tolerated  - f/u w/ EP outpatient for CRT EF from 4/2024 w/ EF 25%, global LV hypokinesis, moderately dilated LV, grade 2 LV diastolic dysfunction, RA moderately dilated. Home meds: torsemide 20mg qd, eplerenone 25mg qd, toprol XL 100MG qd    - cards following   - wean midodrine as tolerated - decrease to midodrine 10mg TID w/ hold parameters   - hold home meds, restart GDMT as tolerated  - f/u w/ EP outpatient for CRT

## 2024-05-10 NOTE — PROGRESS NOTE ADULT - SUBJECTIVE AND OBJECTIVE BOX
[facility] - Howard Kimble, Cherise, Carlos, Herman Alston  Office Number:  [phone]    Patient resting comfortably in bed in NAD.  Laying flat with no respiratory distress.  No complaints of chest pain, dyspnea, palpitations, PND, or orthopnea.  States she is feeling well this morning  Remains off HD, creatinine rising    ROS: negative unless otherwise mentioned.    Telemetry: SR, first degree,     MEDICATIONS  (STANDING):  ammonium lactate 12% Lotion 1 Application(s) Topical <User Schedule>  aspirin  chewable 81 milliGRAM(s) Oral daily  atorvastatin 80 milliGRAM(s) Oral at bedtime  calamine/zinc oxide Lotion 1 Application(s) Topical three times a day  cefTRIAXone   IVPB 1000 milliGRAM(s) IV Intermittent every 24 hours  epoetin mendoza-epbx (RETACRIT) Injectable 99941 Unit(s) SubCutaneous every 7 days  ferrous    sulfate 325 milliGRAM(s) Oral two times a day  heparin   Injectable 5000 Unit(s) SubCutaneous every 8 hours  insulin glargine Injectable (LANTUS) 18 Unit(s) SubCutaneous at bedtime  insulin lispro (ADMELOG) corrective regimen sliding scale   SubCutaneous three times a day before meals  insulin lispro (ADMELOG) corrective regimen sliding scale   SubCutaneous at bedtime  insulin lispro Injectable (ADMELOG) 3 Unit(s) SubCutaneous three times a day before meals  levothyroxine 75 MICROGram(s) Oral daily  midodrine. 10 milliGRAM(s) Oral every 8 hours  Nephro-molly 1 Tablet(s) Oral daily  senna 2 Tablet(s) Oral at bedtime    MEDICATIONS  (PRN):  polyethylene glycol 3350 17 Gram(s) Oral two times a day PRN Constipation      Allergies    contrast media (iodine-based) (Fever; Vomiting; Flushing; Hypotension; Rash; Stomach Upset)  Ativan (Rash; Urticaria)  penicillins (Hives (Mod to Severe); Anaphylaxis (Mod to Severe); Short breath (Mod to Severe); Angioedema (Mod to Severe))    Intolerances        Vital Signs Last 24 Hrs  T(C): 36.6 (10 May 2024 05:10), Max: 36.8 (09 May 2024 11:46)  T(F): 97.8 (10 May 2024 05:10), Max: 98.3 (09 May 2024 11:46)  HR: 63 (10 May 2024 07:39) (63 - 87)  BP: 118/49 (10 May 2024 07:39) (95/56 - 154/70)  BP(mean): --  RR: 18 (10 May 2024 07:39) (18 - 18)  SpO2: 99% (10 May 2024 07:39) (98% - 100%)    Parameters below as of 10 May 2024 05:10  Patient On (Oxygen Delivery Method): nasal cannula        I&O's Summary    09 May 2024 07:01  -  10 May 2024 07:00  --------------------------------------------------------  IN: 520 mL / OUT: 200 mL / NET: 320 mL        ON EXAM:    General: NAD, awake and alert, oriented x 3, obese  HEENT: Mucous membranes are moist, anicteric  Lungs: Non-labored, breath sounds are decreased bilaterally, No wheezing, rales or rhonchi  Cardiovascular: Regular, S1 and S2, no murmurs, rubs, or gallops  Gastrointestinal: Bowel Sounds present, soft, nontender.   Lymph: chronic peripheral edema, with wrapped legs. No lymphadenopathy.  Skin: rash improving  Psych:  Mood & affect appropriate    LABS: All Labs Reviewed:                        9.1    9.61  )-----------( 251      ( 10 May 2024 07:13 )             29.4                         9.9    10.42 )-----------( 256      ( 09 May 2024 10:10 )             33.1                         8.6    10.61 )-----------( 287      ( 08 May 2024 00:26 )             28.8     10 May 2024 07:13    135    |  92     |  67     ----------------------------<  110    4.0     |  23     |  3.66   09 May 2024 10:10    134    |  90     |  68     ----------------------------<  83     4.2     |  28     |  3.57   08 May 2024 00:26    131    |  90     |  64     ----------------------------<  209    3.8     |  24     |  3.08     Ca    8.4        10 May 2024 07:13  Ca    9.1        09 May 2024 10:10  Ca    9.2        08 May 2024 00:26  Phos  5.9       10 May 2024 07:13  Phos  5.6       09 May 2024 10:10  Phos  6.0       08 May 2024 00:26  Mg     2.2       10 May 2024 07:13  Mg     2.1       09 May 2024 10:10  Mg     2.2       08 May 2024 00:26    TPro  6.9    /  Alb  3.2    /  TBili  0.6    /  DBili  x      /  AST  23     /  ALT  8      /  AlkPhos  89     09 May 2024 10:10  TPro  6.8    /  Alb  3.2    /  TBili  0.6    /  DBili  x      /  AST  19     /  ALT  10     /  AlkPhos  75     08 May 2024 00:26          Blood Culture:

## 2024-05-10 NOTE — PROGRESS NOTE ADULT - ASSESSMENT
This is a 72 y/o F with PMHx HFrEF (EF 30%), AS, LBBB, HTN, DM1, CKD, hypothyroidism, chronic lymphedema, LELIA (3L home O2) who presented for syncope 2/2 severe AS, now s/p TAVR 4/24 c/b by VT and Vfib requiring shock and flash pulmonary edema requiring intubation. Course complicated by contrast induced allergic reaction (had CTA for TAVR eval) and contrast related renal failure (acute on chronic) requiring CRRT for fluid removal and pressors for vasoplegia and hypovolemia due to CRRT. Patient now extubated, off pressors, CRRT and downgraded to medicine floors.

## 2024-05-10 NOTE — PROGRESS NOTE ADULT - PROBLEM SELECTOR PLAN 2
- TSH slightly elevated to 4.79-5.06 with normal FT4 1.3-1.5  - Continue home LT4 75mcg daily  - repeat TFT's outpatient when clinically stabilized  - Please make sure LT4 is given on an empty stomach at least one hour apart from other meds and food to ensure med absorption. DO NOT GIVE WITH VITAMINS/ANTACIDS

## 2024-05-10 NOTE — PROGRESS NOTE ADULT - PROBLEM SELECTOR PLAN 9
Incidental finding on CT C/A/P w/ thickened L adrenal gland and 1.2 x 0.8 cm L adrenal nodule.   Two positive DST, concerning for cushing syndrome. Endo following.     - no indication for txt at this time  - f/u outpatient w/ Dr. Maynor Dumont for repeat testing

## 2024-05-10 NOTE — PROGRESS NOTE ADULT - SUBJECTIVE AND OBJECTIVE BOX
DIABETES FOLLOW UP NOTE: Saw pt earlier today    Chief Complaint: Endocrine consult requested for management of DM    INTERVAL HX: Pt states feeling better but with on/off N/V > vomited yesterday several times. Reports able to tolerate breakfast today but noted BG ac lunch 72. No hypoglycemic s/sx reported.  Also getting antibiotic for UTI.        Review of Systems:  General: As above  Cardiovascular: No chest pain, palpitations  Respiratory: No SOB, no cough  GI: No nausea, vomiting, abdominal pain  Endocrine: No polyuria, polydipsia or S&Sx of hypoglycemia    Allergies    contrast media (iodine-based) (Fever; Vomiting; Flushing; Hypotension; Rash; Stomach Upset)  Ativan (Rash; Urticaria)  penicillins (Hives (Mod to Severe); Anaphylaxis (Mod to Severe); Short breath (Mod to Severe); Angioedema (Mod to Severe))    Intolerances      MEDICATIONS:  atorvastatin 80 milliGRAM(s) Oral at bedtime  cefTRIAXone   IVPB 1000 milliGRAM(s) IV Intermittent every 24 hours  insulin glargine Injectable (LANTUS) 18 Unit(s) SubCutaneous at bedtime  insulin lispro (ADMELOG) corrective regimen sliding scale   SubCutaneous three times a day before meals  insulin lispro (ADMELOG) corrective regimen sliding scale   SubCutaneous at bedtime  insulin lispro Injectable (ADMELOG) 3 Unit(s) SubCutaneous three times a day before meals  levothyroxine 75 MICROGram(s) Oral daily      PHYSICAL EXAM:  VITALS: T(C): 36.2 (05-10-24 @ 11:49)  T(F): 97.2 (05-10-24 @ 11:49), Max: 97.9 (05-09-24 @ 21:29)  HR: 63 (05-10-24 @ 11:49) (63 - 72)  BP: 117/64 (05-10-24 @ 11:49) (117/64 - 154/70)  RR:  (18 - 18)  SpO2:  (96% - 100%)  Wt(kg): --  GENERAL: Female laying in bed in NAD.   Abdomen: Soft, nontender, non distended.  Extremities: Warm,+ edema in LE with ace bandages on.  NEURO: A&O X3    LABS:  POCT Blood Glucose.: 72 mg/dL (05-10-24 @ 12:50)  POCT Blood Glucose.: 110 mg/dL (05-10-24 @ 08:44)  POCT Blood Glucose.: 203 mg/dL (05-09-24 @ 21:57)  POCT Blood Glucose.: 130 mg/dL (05-09-24 @ 17:40)  POCT Blood Glucose.: 99 mg/dL (05-09-24 @ 13:18)  POCT Blood Glucose.: 106 mg/dL (05-09-24 @ 08:22)  POCT Blood Glucose.: 216 mg/dL (05-08-24 @ 22:26)  POCT Blood Glucose.: 216 mg/dL (05-08-24 @ 15:58)  POCT Blood Glucose.: 87 mg/dL (05-08-24 @ 12:37)  POCT Blood Glucose.: 74 mg/dL (05-08-24 @ 12:18)  POCT Blood Glucose.: 68 mg/dL (05-08-24 @ 11:59)  POCT Blood Glucose.: 67 mg/dL (05-08-24 @ 11:56)  POCT Blood Glucose.: 136 mg/dL (05-08-24 @ 08:33)  POCT Blood Glucose.: 170 mg/dL (05-07-24 @ 22:10)  POCT Blood Glucose.: 242 mg/dL (05-07-24 @ 17:04)                            9.1    9.61  )-----------( 251      ( 10 May 2024 07:13 )             29.4       05-10    135  |  92<L>  |  67<H>  ----------------------------<  110<H>  4.0   |  23  |  3.66<H>    eGFR: 13<L>    Ca    8.4      05-10  Mg     2.2     05-10  Phos  5.9     05-10    TPro  6.9  /  Alb  3.2<L>  /  TBili  0.6  /  DBili  x   /  AST  23  /  ALT  8<L>  /  AlkPhos  89  05-09      Thyroid Function Tests:  04-14 @ 07:18 TSH 5.06 FreeT4 1.3 T3 -- Anti TPO -- Anti Thyroglobulin Ab -- TSI --  04-13 @ 07:05 TSH 4.79 FreeT4 1.5 T3 -- Anti TPO -- Anti Thyroglobulin Ab -- TSI --      A1C with Estimated Average Glucose Result: 7.4 % (03-30-24 @ 08:21)      Estimated Average Glucose: 166 mg/dL (03-30-24 @ 08:21)        04-13 Chol 74 Direct LDL -- LDL calculated 25 HDL 33<L> Trig 70               Topical Retinoid Pregnancy And Lactation Text: This medication is Pregnancy Category C. It is unknown if this medication is excreted in breast milk.

## 2024-05-10 NOTE — PROGRESS NOTE ADULT - PROBLEM SELECTOR PLAN 12
Likely in setting of CKD vs myelosuppresion in setting of acute illnesses. No obvious source of bleed. Hgb Stable.     - daily CBC  - transfuse for goal Hgb >7  - EPO 3x/week Likely in setting of CKD vs myelosuppresion in setting of acute illnesses. No obvious source of bleed. Hgb Stable.     - daily CBC  - transfuse for goal Hgb >7  - EPO

## 2024-05-10 NOTE — PROCEDURE NOTE - NSINDICATIONS_GEN_A_CORE
symptomatic bradycardia
Is and Os/other
dialysis/CRRT
arterial puncture to obtain ABG's/critical patient/monitoring purposes
venous access
altered anatomy/strict intake/output during critical illness/urinry obstruction or retention
critical illness/hemodynamic monitoring/venous access
dialysis/CRRT
transvenous pacemaker insertion

## 2024-05-10 NOTE — PROGRESS NOTE ADULT - PROBLEM SELECTOR PLAN 11
on 3L NC at home. Not on CPAP. Hospital course complicated w/ AHRF 2/2 to flash pulm edema requiring intubation. Now extubated and stable on NC    - continue to monitor

## 2024-05-10 NOTE — PROCEDURE NOTE - NSPROCDETAILS_GEN_ALL_CORE
location identified, draped/prepped, sterile technique used/sterile dressing applied/sterile technique, catheter placed/supine position/ultrasound guidance
guidewire recovered/lumen(s) aspirated and flushed/sterile dressing applied/sterile technique, catheter placed/ultrasound guidance with use of sterile gel and probe cove
sterile technique, indwelling urinary device inserted
guidewire recovered/lumen(s) aspirated and flushed/sterile dressing applied/sterile technique, catheter placed/ultrasound guidance with use of sterile gel and probe cove
sterile technique, indwelling urinary device inserted
location identified, draped/prepped, sterile technique used, needle inserted/introduced/positive blood return obtained via catheter/connected to a pressurized flush line/sutured in place/hemostasis with direct pressure, dressing applied/Seldinger technique/all materials/supplies accounted for at end of procedure
guidewire recovered/lumen(s) aspirated and flushed/sterile dressing applied/sterile technique, catheter placed/ultrasound guidance with use of sterile gel and probe cove
fluoro/guidewire recovered/lumen(s) aspirated and flushed/sterile dressing applied/sterile technique, catheter placed/ultrasound guidance with use of sterile gel and probe cove

## 2024-05-10 NOTE — PROGRESS NOTE ADULT - PROBLEM SELECTOR PLAN 10
Chronic lymphedema w/ wounds.     - ctm  - elevate legs as tolerated, encourage ambulation  - compression stockings, ace wrapping  - f/u wound care recs

## 2024-05-10 NOTE — PROGRESS NOTE ADULT - PROBLEM SELECTOR PLAN 2
DUNCAN secondary to ATN from hypotension and contrast nephropathy. CKD stage 4. s/p CRRT and bumex drip in CICU.   Bun 64->68, Cr 3.08-> 3.57. Suspect bump in Cr likely in setting of dehydration, poor PO intake given n/v.     - f/u nephro recs   - strict I's and O's  - renally dose meds, avoid nephrotoxic agents DUNCAN secondary to ATN from hypotension and contrast nephropathy. CKD stage 4. s/p CRRT and bumex drip in CICU.   Bun 64->68, Cr 3.08-> 3.57 -> 3.66. Suspect bump in Cr likely in setting of dehydration, poor PO intake given n/v, now s/p 500cc mIVF.     - f/u nephro recs   - strict I's and O's  - renally dose meds, avoid nephrotoxic agents

## 2024-05-10 NOTE — PROGRESS NOTE ADULT - NUTRITIONAL ASSESSMENT
This patient has been assessed with a concern for Malnutrition and has been determined to have a diagnosis/diagnoses of Morbid obesity (BMI > 40).    This patient is being managed with:   Diet Clear Liquid-  Consistent Carbohydrate {Evening Snack} (CSTCHOSN)  Entered: May  9 2024 11:44AM

## 2024-05-10 NOTE — PROCEDURE NOTE - NSPROCNAME_GEN_A_CORE
PPM Interrogation Note
Urinary Device Placement
Midline Insertion
Transvenous Pacemaker
Arterial Puncture/Cannulation
Central Line Insertion
Urinary Device Placement
Central Line Insertion

## 2024-05-10 NOTE — PROGRESS NOTE ADULT - SUBJECTIVE AND OBJECTIVE BOX
*******************************  Cristina Ko MD (PGY-1)  Internal Medicine  Contact via [employer] TEAMS  *******************************    EVELYN LOPEZ  73y  Female    Patient is a 73y old  Female who presents with a chief complaint of Syncope (09 May 2024 16:22)      Subjective:    Objective:  T(C): 36.6 (05-10-24 @ 05:10), Max: 36.8 (05-09-24 @ 11:46)  HR: 72 (05-10-24 @ 05:10) (66 - 87)  BP: 154/70 (05-10-24 @ 05:10) (95/56 - 154/70)  RR: 18 (05-10-24 @ 05:10) (18 - 18)  SpO2: 99% (05-10-24 @ 05:10) (98% - 100%)  I&O's Summary    09 May 2024 07:01  -  10 May 2024 07:00  --------------------------------------------------------  IN: 520 mL / OUT: 200 mL / NET: 320 mL        PHYSICAL EXAM:  GENERAL: NAD  HEAD:  Atraumatic, Normocephalic  EYES: EOMI, PERRLA, conjunctiva and sclera clear  ENMT: Moist mucous membranes  NECK: Supple, No JVD, trachea midline   NERVOUS SYSTEM:  Alert & Oriented X3, Good concentration; Motor Strength 5/5 B/L upper and lower extremities; DTRs 2+ intact and symmetric  CHEST/LUNG: Clear to auscultation bilaterally; No rales, rhonchi, wheezing, or rubs  HEART: Regular rate and rhythm; No murmurs, rubs, or gallops  ABDOMEN: Soft, Nontender, Nondistended; Bowel sounds present  EXTREMITIES:  2+ Peripheral Pulses, No clubbing, cyanosis, or edema  LYMPH: No lymphadenopathy noted  SKIN: No rashes or lesions    MEDICATIONS  (STANDING):  ammonium lactate 12% Lotion 1 Application(s) Topical <User Schedule>  aspirin  chewable 81 milliGRAM(s) Oral daily  atorvastatin 80 milliGRAM(s) Oral at bedtime  calamine/zinc oxide Lotion 1 Application(s) Topical three times a day  cefTRIAXone   IVPB 1000 milliGRAM(s) IV Intermittent every 24 hours  epoetin mendoza-epbx (RETACRIT) Injectable 78842 Unit(s) SubCutaneous every 7 days  ferrous    sulfate 325 milliGRAM(s) Oral two times a day  heparin   Injectable 5000 Unit(s) SubCutaneous every 8 hours  insulin glargine Injectable (LANTUS) 18 Unit(s) SubCutaneous at bedtime  insulin lispro (ADMELOG) corrective regimen sliding scale   SubCutaneous three times a day before meals  insulin lispro (ADMELOG) corrective regimen sliding scale   SubCutaneous at bedtime  insulin lispro Injectable (ADMELOG) 3 Unit(s) SubCutaneous three times a day before meals  levothyroxine 75 MICROGram(s) Oral daily  Nephro-molly 1 Tablet(s) Oral daily  senna 2 Tablet(s) Oral at bedtime  sodium chloride 0.9%. 500 milliLiter(s) (75 mL/Hr) IV Continuous <Continuous>    MEDICATIONS  (PRN):  polyethylene glycol 3350 17 Gram(s) Oral two times a day PRN Constipation      LABS:        CAPILLARY BLOOD GLUCOSE      POCT Blood Glucose.: 203 mg/dL (09 May 2024 21:57)  POCT Blood Glucose.: 130 mg/dL (09 May 2024 17:40)  POCT Blood Glucose.: 99 mg/dL (09 May 2024 13:18)  POCT Blood Glucose.: 106 mg/dL (09 May 2024 08:22)      RADIOLOGY & ADDITIONAL TESTS:               *******************************  Cristina Ko MD (PGY-1)  Internal Medicine  Contact via [employer] TEAMS  *******************************    EVELYN LOPEZ  73y  Female    Patient is a 73y old  Female who presents with a chief complaint of Syncope (09 May 2024 16:22)    Subjective: No acute events overnight. Patient sitting up in bed when seen this morning. Noted improvement in n/v. Denied any fever, chills, chest pain, sob, ab pain, n/v.     Objective:  T(C): 36.6 (05-10-24 @ 05:10), Max: 36.8 (05-09-24 @ 11:46)  HR: 72 (05-10-24 @ 05:10) (66 - 87)  BP: 154/70 (05-10-24 @ 05:10) (95/56 - 154/70)  RR: 18 (05-10-24 @ 05:10) (18 - 18)  SpO2: 99% (05-10-24 @ 05:10) (98% - 100%)  I&O's Summary    09 May 2024 07:01  -  10 May 2024 07:00  --------------------------------------------------------  IN: 520 mL / OUT: 200 mL / NET: 320 mL    PHYSICAL EXAM:  GENERAL: NAD  HEAD:  Atraumatic, Normocephalic  EYES: EOMI, PERRLA, conjunctiva and sclera clear  ENMT: Moist mucous membranes  NECK: Supple, No JVD, trachea midline   NERVOUS SYSTEM:  Alert & Oriented X3, Good concentration  CHEST/LUNG: Clear to auscultation bilaterally on anterior exam; No rales, rhonchi, wheezing, or rubs  HEART: Regular rate and rhythm; No murmurs, rubs, or gallops  ABDOMEN: Soft, Nontender, Nondistended; Bowel sounds present  EXTREMITIES:  2+ Peripheral Pulses, b/l LE in ACE wrap   SKIN: No rashes or lesions    MEDICATIONS  (STANDING):  ammonium lactate 12% Lotion 1 Application(s) Topical <User Schedule>  aspirin  chewable 81 milliGRAM(s) Oral daily  atorvastatin 80 milliGRAM(s) Oral at bedtime  calamine/zinc oxide Lotion 1 Application(s) Topical three times a day  cefTRIAXone   IVPB 1000 milliGRAM(s) IV Intermittent every 24 hours  epoetin mendoza-epbx (RETACRIT) Injectable 70962 Unit(s) SubCutaneous every 7 days  ferrous    sulfate 325 milliGRAM(s) Oral two times a day  heparin   Injectable 5000 Unit(s) SubCutaneous every 8 hours  insulin glargine Injectable (LANTUS) 18 Unit(s) SubCutaneous at bedtime  insulin lispro (ADMELOG) corrective regimen sliding scale   SubCutaneous three times a day before meals  insulin lispro (ADMELOG) corrective regimen sliding scale   SubCutaneous at bedtime  insulin lispro Injectable (ADMELOG) 3 Unit(s) SubCutaneous three times a day before meals  levothyroxine 75 MICROGram(s) Oral daily  Nephro-molly 1 Tablet(s) Oral daily  senna 2 Tablet(s) Oral at bedtime  sodium chloride 0.9%. 500 milliLiter(s) (75 mL/Hr) IV Continuous <Continuous>    MEDICATIONS  (PRN):  polyethylene glycol 3350 17 Gram(s) Oral two times a day PRN Constipation    LABS:             9.1    9.61  )-----------( 251      ( 10 May 2024 07:13 )             29.4   05-10    135  |  92<L>  |  67<H>  ----------------------------<  110<H>  4.0   |  23  |  3.66<H>    Ca    8.4      10 May 2024 07:13  Phos  5.9     05-10  Mg     2.2     05-10    TPro  6.9  /  Alb  3.2<L>  /  TBili  0.6  /  DBili  x   /  AST  23  /  ALT  8<L>  /  AlkPhos  89  05-09    Culture Results:   No growth at 5 days (04-27 @ 12:20)  Culture Results:   No growth at 5 days (04-27 @ 12:19)  Culture Results:   No growth at 5 days (04-25 @ 10:15)  Culture Results:   Growth in aerobic bottle: Staphylococcus epidermidis  Isolation of Coagulase negative Staphylococcus from single blood culture  sets may represent  contamination. Contact the Microbiology Department at [phone] if  susceptibility testing is  clinically indicated.  Direct identification is available within approximately 3-5  hours either by Blood Panel Multiplexed PCR or Direct  MALDI-TOF. Details: [email or web address] (04-25 @ 10:15)  Culture Results:   Rare Yeast (04-16 @ 19:11)  Culture Results:   10,000 - 49,000 CFU/mL Escherichia coli (04-16 @ 14:57)  Culture Results:   No growth at 5 days (04-16 @ 00:28)  Culture Results:   No growth at 5 days (04-16 @ 00:20)    CAPILLARY BLOOD GLUCOSE  POCT Blood Glucose.: 72 mg/dL (10 May 2024 12:50)  POCT Blood Glucose.: 203 mg/dL (09 May 2024 21:57)  POCT Blood Glucose.: 130 mg/dL (09 May 2024 17:40)  POCT Blood Glucose.: 99 mg/dL (09 May 2024 13:18)  POCT Blood Glucose.: 106 mg/dL (09 May 2024 08:22)      RADIOLOGY & ADDITIONAL TESTS:

## 2024-05-10 NOTE — PROCEDURE NOTE - ADDITIONAL PROCEDURE DETAILS
73yoF called for difficult hardy catheterization. Multiple attempts from primary team prior to urology consult.   14F coude catheter inserted through finger feel.   Of note, area of skin above pt's vaginal opening that looks urethral opening - this is likely the clitoris - urethral opening likely hidden within vaginal canal.     [facility]  [address] [place] [address], [address]  [place] [id number] [phone] 73yoF called for difficult hardy catheterization. Multiple attempts from primary team prior to urology consult.   14F coude catheter inserted into the vagina through finger feel within the retracted urethra.   Of note, area of skin above pt's vaginal opening that looks urethral opening - this is the clitoris - urethral opening hidden within vaginal canal.     [facility]  [address] [place] [address], [address]  [place] [address] [phone] 73yoF called for difficult hardy catheterization. Multiple attempts from primary team prior to urology consult.   14F coude catheter inserted into the vagina through finger feel within the retracted urethra, draining well, translucent yellow-colored urine.   Of note, area of skin above pt's vaginal opening that looks urethral opening - this is the clitoris - urethral opening hidden within vaginal canal.     [facility]  [address] [place] [address], [place] [id number] [phone] 73yoF called for difficult hardy catheterization. Multiple attempts from primary team prior to urology consult.   14F coude catheter inserted into the vagina through finger feel within the retracted urethra, draining well, translucent yellow-colored urine.    Of note, area of skin above pt's vaginal opening that looks urethral opening - this is the clitoris - urethral opening hidden within vaginal canal.     [facility]  [address] [place] [address], [place] [id number] [phone]

## 2024-05-10 NOTE — PROGRESS NOTE ADULT - ATTENDING COMMENTS
Praful Acevedo MD  [facility] [place]  Staff is applying for approval for this.  Can schedule once approved:   Diagnosis: symptomatic varicose veins with reflux and pain   Location: [facility]   Admit type: day surgery   Anesthesia:MAC   Length of case: 1 hours   Assistant: None   Position: supine   History and Physical: surgeon   Patient medical status: moderate   Bowel prep: None   Blood bank: none   Antibiotics:Ancef 2gm IV     Allergies: is allergic to gluten meal   (food or med), soybean allergy    (food or med), flax seed   (food or med), naproxen, sunflower oil   (food or med), and onion   (food or med).   Special: ultrasound   Post-op appointment: within 5 days      73F PMH: HFrEF (EF 30%), AS, LBBB, HTN, DM1, CKD, hypothyroidism, chronic lymphedema, LELIA (3L home O2) p/w for syncope 2/2 severe AS, s/p TAVR 4/24. Course complicated by contrast induced allergic reaction (had CTA for TAVR eval) and contrast related renal failure (acute on chronic) requiring HD. TAVR c/b VT and vfib requiring shock and flash pulmonary edema causing AHRF requiring intubation. Currently extubated,  on CVVPHD for fluid removal, and on pressor support for vasoplegia and hypovolemia due to CRRT.    Seen and examined at bedside. Nausea improved, tolerating clear liquid diet but wants to hold off on solids.   Diffuse dry rash improving  Lungs clear on exam.  continue to taper Midodrine.   F/u Renal re: BUN/Cr, BUN stable but still high, ?new baseline  continue abx for UTI  Start GDMT if blood pressure allows  dc planning to [facility]

## 2024-05-10 NOTE — PROGRESS NOTE ADULT - SUBJECTIVE AND OBJECTIVE BOX
[facility]   [phone]  RENAL FOLLOW UP NOTE  --------------------------------------------------------------------------------  Chief Complaint:    24 hour events/subjective:    patient seen and examined.   tolerating po diet    PAST HISTORY  --------------------------------------------------------------------------------  No significant changes to PMH, PSH, FHx, SHx, unless otherwise noted    ALLERGIES & MEDICATIONS  --------------------------------------------------------------------------------  Allergies    contrast media (iodine-based) (Fever; Vomiting; Flushing; Hypotension; Rash; Stomach Upset)  Ativan (Rash; Urticaria)  penicillins (Hives (Mod to Severe); Anaphylaxis (Mod to Severe); Short breath (Mod to Severe); Angioedema (Mod to Severe))    Intolerances      Standing Inpatient Medications  ammonium lactate 12% Lotion 1 Application(s) Topical <User Schedule>  aspirin  chewable 81 milliGRAM(s) Oral daily  atorvastatin 80 milliGRAM(s) Oral at bedtime  calamine/zinc oxide Lotion 1 Application(s) Topical three times a day  cefTRIAXone   IVPB 1000 milliGRAM(s) IV Intermittent every 24 hours  epoetin mendoza-epbx (RETACRIT) Injectable 71744 Unit(s) SubCutaneous every 7 days  ferrous    sulfate 325 milliGRAM(s) Oral two times a day  heparin   Injectable 5000 Unit(s) SubCutaneous every 8 hours  insulin glargine Injectable (LANTUS) 18 Unit(s) SubCutaneous at bedtime  insulin lispro (ADMELOG) corrective regimen sliding scale   SubCutaneous three times a day before meals  insulin lispro (ADMELOG) corrective regimen sliding scale   SubCutaneous at bedtime  insulin lispro Injectable (ADMELOG) 3 Unit(s) SubCutaneous three times a day before meals  levothyroxine 75 MICROGram(s) Oral daily  midodrine. 10 milliGRAM(s) Oral every 8 hours  Nephro-molly 1 Tablet(s) Oral daily  senna 2 Tablet(s) Oral at bedtime    PRN Inpatient Medications  polyethylene glycol 3350 17 Gram(s) Oral two times a day PRN      REVIEW OF SYSTEMS  --------------------------------------------------------------------------------    Gen: denies fevers/chills,  CVS: denies chest pain/palpitations  Resp: denies SOB/Cough  GI: Denies N/V/Abd pain  : Denies dysuria    VITALS/PHYSICAL EXAM  --------------------------------------------------------------------------------  T(C): 36.2 (05-10-24 @ 11:49), Max: 36.6 (05-09-24 @ 21:29)  HR: 63 (05-10-24 @ 11:49) (63 - 72)  BP: 117/64 (05-10-24 @ 11:49) (117/64 - 154/70)  RR: 18 (05-10-24 @ 11:49) (18 - 18)  SpO2: 96% (05-10-24 @ 11:49) (96% - 100%)  Wt(kg): --        05-09-24 @ 07:01  -  05-10-24 @ 07:00  --------------------------------------------------------  IN: 520 mL / OUT: 200 mL / NET: 320 mL    05-10-24 @ 07:01  -  05-10-24 @ 14:47  --------------------------------------------------------  IN: 600 mL / OUT: 0 mL / NET: 600 mL      Physical Exam:  	              Gen: comfortable appearing   	Pulm: decrease bs  no rales or ronchi or wheezing  	CV: No JVD. RRR, S1S2; no rub II/VI M   	Abd: +BS, soft, nontender/nondistended, obese   	UE: Warm, no cyanosis  no clubbing, no edema  	LE: Warm, no cyanosis  no clubbing, 1+ softer edema,  	Neuro: alert and oriented     LABS/STUDIES  --------------------------------------------------------------------------------              9.1    9.61  >-----------<  251      [05-10-24 @ 07:13]              29.4     135  |  92  |  67  ----------------------------<  110      [05-10-24 @ 07:13]  4.0   |  23  |  3.66        Ca     8.4     [05-10-24 @ 07:13]      Mg     2.2     [05-10-24 @ 07:13]      Phos  5.9     [05-10-24 @ 07:13]    TPro  6.9  /  Alb  3.2  /  TBili  0.6  /  DBili  x   /  AST  23  /  ALT  8   /  AlkPhos  89  [05-09-24 @ 10:10]          Creatinine Trend:  SCr 3.66 [05-10 @ 07:13]  SCr 3.57 [05-09 @ 10:10]  SCr 3.08 [05-08 @ 00:26]  SCr 3.02 [05-07 @ 00:32]  SCr 3.25 [05-06 @ 01:53]                PTH -- (Ca 8.4)      [04-19-24 @ 17:54]   109  TSH 5.06      [04-14-24 @ 07:18]  Lipid: chol 74, TG 70, HDL 33, LDL --      [04-13-24 @ 07:05]

## 2024-05-10 NOTE — PROGRESS NOTE ADULT - PROBLEM SELECTOR PLAN 6
Home regimen: lantus 33u qhs w/ premeal sliding scale. A1c 7.4% in 3/2024.     - endo following, appreciate recs   - continue w/ lantus 34 units at bedtime, admelog 4 units pre-meal  - low dose premeal and bedtime sliding scale  - hold premeal if NPO   - hypoglycemia protocol Home regimen: lantus 33u qhs w/ premeal sliding scale. A1c 7.4% in 3/2024.     - endo following, appreciate recs   - continue w/ lantus 18 units at bedtime, admelog 3 units pre-meal  - low dose premeal and bedtime sliding scale  - hold premeal if NPO   - hypoglycemia protocol

## 2024-05-10 NOTE — PROGRESS NOTE ADULT - PROBLEM SELECTOR PLAN 3
Presented for syncope secondary to severe AS, s/p TAVR on 4/24 c/b by VT -> shock -> VFib -> shock.  Course complicated w/ symptomatic bradycardia, requiring TVP, now s/p micra PPM.     - continue aspirin

## 2024-05-10 NOTE — PROCEDURE NOTE - PROCEDURE DATE TIME, MLM
25-Apr-2024 13:00
18-Apr-2024 12:52
10-May-2024 17:13
25-Apr-2024 06:50
27-Apr-2024 11:27
24-Apr-2024 17:58
25-Apr-2024 06:55
24-Apr-2024 18:01

## 2024-05-10 NOTE — PROGRESS NOTE ADULT - ASSESSMENT
73F w/h/o of T1DM with unknown control due to CKD and anemia of chronic disease. DM c/b CKD. Also h/o HFrEF (EF 30%), mod AS, known LBBB, HTN,  hypothyroidism, chronic lymphedema, suspected OHS/LELIA on 3L NC home O2 p/w 1 episode of syncope with R arm jerking, likely 2/2 severe symptomatic AS. S/p AVR 4/24 c/b DUNCAN on CKD, fever and hypotension. Also  UTI/pul edema/ sepsis and L arenal nodule finding. Endocrine consult done for DM and adrenal nodule  management. Tolerating POs with prandial BG levels still tightly controlled. Will decrease meal time insulin until pt able to eat more consistently. BG goal 100 to 180s.   Per endocrine attending Dr Burton> Adrenal nodule work up completed and pt will need to f/u once she is more stable as out pt.       Home regimen: Lantus 33 units qhs, Novolog based on sliding scale TIDAC normally 3-5 units  Has Dexcom G7

## 2024-05-10 NOTE — PROGRESS NOTE ADULT - PROBLEM SELECTOR PLAN 1
- Test BG ac and hs  - Continue with Lantus 18 units qhs for now. If BG <100s in an decrease to 16 units  - Change Admelog dose to 2 units TIDAC for now> will adjust as needed  - C/w Low dose admelog correction scale TIDAC, Low dose admelog correction scale QHS  -Will follow  Discharge:  Will be determined according to BG/insulin needs/PO intake  at time of discharge. Basal/bolus insulin doses TBD  - Of note, patient instructed on discharge from recent hospitalization at [facility] to start farxiga for CHF. Given risks of DKA of SGLT2i in T1DM, would not start until better data is available for its benefits.  - Follow up with Dr. Luis Dumont outpatient  -Pt will likely required rehab  F/u with optho/renal/cardiac as out pt

## 2024-05-10 NOTE — PROGRESS NOTE ADULT - PROBLEM SELECTOR PLAN 7
Likely in setting of deconditioning.     - leg compression, abdominal binder   - PT rec [facility]   - OOB as tolerated   - midodrine 30mg TID

## 2024-05-10 NOTE — PROGRESS NOTE ADULT - ASSESSMENT
73F w HFrEF (EF 30%), mod AS, known LBBB, HTN, IDDM1, CKD, hypothyroidism, chronic lymphedema, p/w 1 episode of syncope with R arm jerking.     #Syncope-Aortic Stenosis  - Known Aortic Stenosis likely Severe in setting of decreased LVEF  - s/p tavr on 4/24 c/b VT -> shock -> VFib -> shock  - hypoxic/congested, and was intubated, now extubated on 4/25 in the evening, on NC   - on 4/25 with worsening bradycardia, and now s/p TVP placement followed by AV Micra placement with removal of TVP  - Remains in Sinus Rhythm/known lbbb with intermittent   - Vaso has been weaned off with the uptitration of midodrine  - On Midodrine 10mg Q8, has been decreased substantially   - to consider CRT-D in the future  - Cannot initiate GDMT due to blood pressures  - would try to mobilize as best as possible.   - cont asa and statin    #Chronic Systolic HF (EF 30%), mod to severe AS, HTN, LBBB, Lymphedema   - Has known systolic HF and AS.    - Repeat TTE showed EF 30%, mild-mod LVH, mildly reduced RVF, mod-severe AS (.61 cmsq), mod pHTN  - On home Torsemide 20 mg daily, Eplerenone 25 mg daily, and Toprol  mg daily, all currently on hold  - initially CVVHD, then HD  - HD now on hold, as she is not oliguric  - prn iv bumex  - renal fu    - nausea/vomiting overnight, in setting of uti, now on abx, was rec'd for IVF on 5/9  - will follow with you

## 2024-05-10 NOTE — PROCEDURE NOTE - NSFINDINGS_GEN_A_CORE
electrical capture achieved
positive return of urine in the collection bag
positive return of urine in the collection bag

## 2024-05-10 NOTE — PROGRESS NOTE ADULT - NSPROGADDITIONALINFOA_GEN_ALL_CORE
-Plan discussed with pt/team.  Contact info: [phone] (24/7). pager [phone]  Amion on [employer]  Teams on M-T-W-F. Unavailable Thu/Weekends/Holidays  Assessed pt/labs/meds and discussed plan of care with primary team  Adjusting insulin  Discharge plan  Follow up care

## 2024-05-10 NOTE — PROCEDURE NOTE - NSPERFORMEDBY_GEN_A_CORE
TABATHA Bustillo-C/Myself/PA
Ze/PA
Myself
[employer] representative/Other
Myself
Myself

## 2024-05-10 NOTE — PROGRESS NOTE ADULT - ASSESSMENT
73F w HFrEF (EF 30%), mod AS, known LBBB, HTN, IDDM1, CKD, hypothyroidism, chronic lymphedema, suspected OHS/LELIA on 3L NC home O2 p/w 1 episode of syncope. Recent admission at [facility] (3/29-4/5) for ADHF and DUNCAN s/p diuresis, discharged with new home O2 3L NC suspecting OHS/LELIA pending outpatient w/u. Since discharge a week ago, she has been staying at home with   Pt had sob walking to car. Once in car, she was still breathing heavily. Partner noticed pt was not responding to verbal stimuli for 10-15sec and witnessed R arm jerking. Pt gained consciousness quickly without postictal symptoms. Pt went to Cardiologist Dr. Nathan who recommended pt to come to ED. No fever, cp, abd pain, n/v. Leg swelling is improved from prior. Pt on torsemide 40mg which she has been taking. Pt was discharged on 3LNC which she is currently on. Patient reports she did not take Farxiga that she was discharged on as she was concerned about side effects.     In ED, patient was found afebrile, hemodynamically stable, breathing well on 3L NC. Initial labs notable for mild hyponatremia, DUNCAN on CKD, elevated proBNP and elevated pCO2 both improved from prior.  pt also noticed with abn creatinine      1- CKD IV  with DUNCAN   2- CHF   3- lymphedema   4- severe AS  s/p tavr 4/24  5- hypotension     creatinine steady today  s/p ivf 5/9  given she is tolerating po intake, no ivf today  anemia, trend hgb  retacrit 10,000 units weekly  has + uo is non oliguric   hardy removed, TOV  bladder scan q8h  ceftriaxone 1G daily for UTI  midodrine 10 mg tid, decreasing  trend bp  strict I/O   trend creatinine and electrolytes daily

## 2024-05-10 NOTE — PROCEDURE NOTE - NSINFORMCONSENT_GEN_A_CORE
Benefits, risks, and possible complications of procedure explained to patient/caregiver who verbalized understanding and gave verbal consent.
This was an emergent procedure.
Benefits, risks, and possible complications of procedure explained to patient/caregiver who verbalized understanding and gave verbal consent.
Benefits, risks, and possible complications of procedure explained to patient/caregiver who verbalized understanding and gave written consent.
This was an emergent procedure.
Benefits, risks, and possible complications of procedure explained to patient/caregiver who verbalized understanding and gave written consent.

## 2024-05-10 NOTE — PROGRESS NOTE ADULT - PROBLEM SELECTOR PLAN 1
U/A w/ large LE, 501 WBC, 5 RBC and mod bacteria. Previous urine cx w/ ecoli, pansensitive.     - continue ceftriaxone (5/8- )   - f/u urine culture   - d/c hardy

## 2024-05-11 NOTE — PROGRESS NOTE ADULT - PROBLEM SELECTOR PLAN 7
Likely in setting of deconditioning.     - leg compression, abdominal binder   - PT rec [facility]   - OOB as tolerated   - midodrine 30mg TID Likely in setting of deconditioning.     - leg compression, abdominal binder   - PT rec [facility]   - OOB as tolerated   - midodrine d/c'ed

## 2024-05-11 NOTE — PROGRESS NOTE ADULT - ASSESSMENT
73F w HFrEF (EF 30%), mod AS, known LBBB, HTN, IDDM1, CKD, hypothyroidism, chronic lymphedema, suspected OHS/LLEIA on 3L NC home O2 p/w 1 episode of syncope. Recent admission at [facility] (3/29-4/5) for ADHF and DUNCAN s/p diuresis, discharged with new home O2 3L NC suspecting OHS/LELIA pending outpatient w/u. Since discharge a week ago, she has been staying at home with   Pt had sob walking to car. Once in car, she was still breathing heavily. Partner noticed pt was not responding to verbal stimuli for 10-15sec and witnessed R arm jerking. Pt gained consciousness quickly without postictal symptoms. Pt went to Cardiologist Dr. Nathan who recommended pt to come to ED. No fever, cp, abd pain, n/v. Leg swelling is improved from prior. Pt on torsemide 40mg which she has been taking. Pt was discharged on 3LNC which she is currently on. Patient reports she did not take Farxiga that she was discharged on as she was concerned about side effects.     In ED, patient was found afebrile, hemodynamically stable, breathing well on 3L NC. Initial labs notable for mild hyponatremia, DUNCAN on CKD, elevated proBNP and elevated pCO2 both improved from prior.  pt also noticed with abn creatinine      1- CKD IV  with DUNCAN   2- CHF   3- lymphedema   4- severe AS  s/p tavr 4/24  5- hypotension     creatinine steady today  s/p ivf 5/9  anemia, trend hgb  retacrit 10,000 units weekly  has + uo is non oliguric   hardy removed, TOV but required hardy again for urinary retention   enterococci UTI amoxicillin   midodrine 10 mg tid,  trend bp  strict I/O   trend creatinine and electrolytes daily

## 2024-05-11 NOTE — PROGRESS NOTE ADULT - ATTENDING COMMENTS
73F PMH: HFrEF (EF 30%), AS, LBBB, HTN, DM1, CKD, hypothyroidism, chronic lymphedema, LELIA (3L home O2) p/w for syncope 2/2 severe AS, s/p TAVR 4/24. Course complicated by contrast induced allergic reaction (had CTA for TAVR eval) and contrast related renal failure (acute on chronic) requiring HD. TAVR c/b VT and vfib requiring shock and flash pulmonary edema causing AHRF requiring intubation.     Diffuse dry rash improving  Continue to taper Midodrine  F/u Renal recs   Start GDMT if blood pressure allows  dc planning to [facility]

## 2024-05-11 NOTE — PROGRESS NOTE ADULT - SUBJECTIVE AND OBJECTIVE BOX
[facility]   [phone]  RENAL FOLLOW UP NOTE  --------------------------------------------------------------------------------  Chief Complaint:    24 hour events/subjective:    seen earlier   has had nausea no sob     PAST HISTORY  --------------------------------------------------------------------------------  No significant changes to PMH, PSH, FHx, SHx, unless otherwise noted    ALLERGIES & MEDICATIONS  --------------------------------------------------------------------------------  Allergies    contrast media (iodine-based) (Fever; Vomiting; Flushing; Hypotension; Rash; Stomach Upset)  Ativan (Rash; Urticaria)  penicillins (Hives (Mod to Severe); Anaphylaxis (Mod to Severe); Short breath (Mod to Severe); Angioedema (Mod to Severe))    Intolerances      Standing Inpatient Medications  ammonium lactate 12% Lotion 1 Application(s) Topical <User Schedule>  aspirin  chewable 81 milliGRAM(s) Oral daily  atorvastatin 80 milliGRAM(s) Oral at bedtime  calamine/zinc oxide Lotion 1 Application(s) Topical three times a day  epoetin mendoza-epbx (RETACRIT) Injectable 55167 Unit(s) SubCutaneous every 7 days  ferrous    sulfate 325 milliGRAM(s) Oral two times a day  heparin   Injectable 5000 Unit(s) SubCutaneous every 8 hours  insulin glargine Injectable (LANTUS) 16 Unit(s) SubCutaneous at bedtime  insulin lispro (ADMELOG) corrective regimen sliding scale   SubCutaneous three times a day before meals  insulin lispro (ADMELOG) corrective regimen sliding scale   SubCutaneous at bedtime  insulin lispro Injectable (ADMELOG) 2 Unit(s) SubCutaneous three times a day with meals  levothyroxine 75 MICROGram(s) Oral daily  midodrine 5 milliGRAM(s) Oral three times a day  Nephro-molly 1 Tablet(s) Oral daily  senna 2 Tablet(s) Oral at bedtime    PRN Inpatient Medications  polyethylene glycol 3350 17 Gram(s) Oral two times a day PRN      REVIEW OF SYSTEMS  --------------------------------------------------------------------------------    Gen: denies  fevers/chills,  CVS: denies chest pain/palpitations  Resp: denies SOB/Cough  GI: Denies N/V/Abd pain  :  dysuria +     VITALS/PHYSICAL EXAM  --------------------------------------------------------------------------------  T(C): 36.6 (05-11-24 @ 20:49), Max: 37.3 (05-11-24 @ 11:40)  HR: 96 (05-11-24 @ 20:49) (79 - 123)  BP: 114/55 (05-11-24 @ 21:58) (80/40 - 131/62)  RR: 18 (05-11-24 @ 20:49) (18 - 18)  SpO2: 100% (05-11-24 @ 20:49) (100% - 100%)  Wt(kg): --        05-10-24 @ 07:01  -  05-11-24 @ 07:00  --------------------------------------------------------  IN: 600 mL / OUT: 1275 mL / NET: -675 mL    05-11-24 @ 07:01  -  05-11-24 @ 22:10  --------------------------------------------------------  IN: 900 mL / OUT: 500 mL / NET: 400 mL      Physical Exam:  	    	              Gen: comfortable appearing   	Pulm: decrease bs  no rales or ronchi or wheezing  	CV: No JVD. RRR, S1S2; no rub II/VI M   	Abd: +BS, soft, nontender/nondistended, obese   	UE: Warm, no cyanosis  no clubbing, no edema  	LE: Warm, no cyanosis  no clubbing, 1+ softer edema,  	Neuro: alert and oriented       LABS/STUDIES  --------------------------------------------------------------------------------              9.6    10.53 >-----------<  237      [05-11-24 @ 07:08]              31.7     133  |  91  |  66  ----------------------------<  90      [05-11-24 @ 07:08]  3.8   |  25  |  3.50        Ca     8.6     [05-11-24 @ 07:08]      Mg     2.3     [05-11-24 @ 07:08]      Phos  5.4     [05-11-24 @ 07:08]            Creatinine Trend:  SCr 3.50 [05-11 @ 07:08]  SCr 3.66 [05-10 @ 07:13]  SCr 3.57 [05-09 @ 10:10]  SCr 3.08 [05-08 @ 00:26]  SCr 3.02 [05-07 @ 00:32]          PTH -- (Ca 8.4)      [04-19-24 @ 17:54]   109  TSH 5.06      [04-14-24 @ 07:18]  Lipid: chol 74, TG 70, HDL 33, LDL --      [04-13-24 @ 07:05]

## 2024-05-11 NOTE — PROGRESS NOTE ADULT - PROBLEM SELECTOR PLAN 1
DUNCAN secondary to ATN from hypotension and contrast nephropathy. CKD stage 4. s/p CRRT and bumex drip in CICU.   Bun 64->68, Cr 3.08-> 3.57 -> 3.66. Suspect bump in Cr likely in setting of dehydration, poor PO intake given n/v, now s/p 500cc mIVF.     - f/u nephro recs   - strict I's and O's  - renally dose meds, avoid nephrotoxic agents  - De La Garza for urinary retention DUNCAN secondary to ATN from hypotension and contrast nephropathy. CKD stage 4. s/p CRRT and bumex drip in CICU.   Bun 64->68, Cr 3.08-> 3.57 -> 3.66. Suspect bump in Cr likely in setting of dehydration, poor PO intake given n/v, now s/p 500cc mIVF.     - f/u nephro recs   - strict I's and O's  - renally dose meds, avoid nephrotoxic agents  - De La Garza for urinary retention  - good urine output

## 2024-05-11 NOTE — PROGRESS NOTE ADULT - PROBLEM SELECTOR PLAN 12
Likely in setting of CKD vs myelosuppresion in setting of acute illnesses. No obvious source of bleed. Hgb Stable.     - daily CBC  - transfuse for goal Hgb >7  - EPO

## 2024-05-11 NOTE — CHART NOTE - NSCHARTNOTEFT_GEN_A_CORE
73F w/h/o of T1DM with unknown control due to CKD and anemia of chronic disease. DM c/b CKD. Also h/o HFrEF (EF 30%), mod AS, known LBBB, HTN,  hypothyroidism, chronic lymphedema, suspected OHS/LELIA on 3L NC home O2 p/w 1 episode of syncope with R arm jerking, likely 2/2 severe symptomatic AS. S/p AVR 4/24 c/b DUNCAN on CKD, fever and hypotension. Also  UTI/pul edema/ sepsis and L arenal nodule finding. Endocrine consult done for DM and adrenal nodule management.     #Type 1 diabetes mellitus with hypoglycemia.   - Test BG ac and hs  - Decrease Lantus to 16 units qhs for now  - C/w Admelog 2 units TIDAC for now  - C/w Low dose admelog correction scale TIDAC, Low dose admelog correction scale QHS  Discharge:  Will be determined according to BG/insulin needs/PO intake  at time of discharge. Basal/bolus insulin doses TBD  - Of note, patient instructed on discharge from recent hospitalization at [place] to start farxiga for CHF. Given risks of DKA of SGLT2i in T1DM, would not start until better data is available for its benefits.  - Follow up with Dr. Luis Dmuont outpatient  -Pt will likely required rehab  F/u with optho/renal/cardiac as out pt.    #Hypothyroidism.   - TSH slightly elevated to 4.79-5.06 with normal FT4 1.3-1.5  - Continue home LT4 75mcg daily  - repeat TFT's outpatient when clinically stabilized  - Please make sure LT4 is given on an empty stomach at least one hour apart from other meds and food to ensure med absorption. DO NOT GIVE WITH VITAMINS/ANTACIDS.    #Adrenal nodule.   -Incidentally found on CT c/a/p w/wo IV contrast done for TAVR evaluation. The left adrenal gland is thickened and a 1.2 x 0.8 cm left adrenal nodule is seen. The right adrenal gland is normal.   Primary team discussed with radiology. HU of the adrenal nodule not able to be accurately determined due to motion artifact, also given imaging was taken at venous phase.    Patient had another CT scan which showed normal adrenals on 5/3/24  Test for excess adrenal hormones:   - Dex suppression test: AM cortisol 9.4 not suppressed with sufficiently high dexamethasone 374 (4/18/24). Repeat test AM cortisol 4.1 not suppressed with ACTH 9.5, dex level 449 (4/20/24). Concerning for possible Cushings, likely primary adrenal source given ACTH not elevated, however, patient is also in ICU on pressors and in a stressed state. Urine cortisol low at 1.2mcg/24 hour but only 200ml for 24 hours. Salivary cortisol cancelled for QNS, repeat salivary cortisol specimen received   - plasma metanephrines 46.8 normal range    - serum aldosterone is elevated to 37.4. Plasma renin activity 9.775. Ratio = 3.82, not elevated, no hyperaldosteronism.    - DHEAS 139 wnl. Androstenedione <10.    PLAN  - Patient with 2 positive DST - concerning for cushing syndrome. No indication for treatment at this time, would recommend repeating testing outpatient after patient resolved from acute critical illness - will give signout to patient's endocrinologist Dr. Luis Dumont prior to discharge   - Follow up salivary cortisol as out pt as per Dr Burton. No need to test at this time  - May need to repeat 24 hour urine cortisol once renal function improved and no longer on CRRT   - Will need follow up outpatient with Dr. Luis Dumont.    Stefan Kauffman MD  Endocrine Fellow  For nonurgent matters, please email [email or web address] or [email or web address]. For urgent matters only, please call answering service at [phone] (weekdays), [phone] (nights/weekends).

## 2024-05-11 NOTE — PROGRESS NOTE ADULT - PROBLEM SELECTOR PLAN 6
Home regimen: lantus 33u qhs w/ premeal sliding scale. A1c 7.4% in 3/2024.     - endo following, appreciate recs   - continue w/ lantus 18 units at bedtime, admelog 3 units pre-meal  - low dose premeal and bedtime sliding scale  - hold premeal if NPO   - hypoglycemia protocol

## 2024-05-11 NOTE — PROGRESS NOTE ADULT - SUBJECTIVE AND OBJECTIVE BOX
PROGRESS NOTE:   Authored by Almas Poole MD     Patient is a 73y old  Female who presents with a chief complaint of Syncope (10 May 2024 16:09)      SUBJECTIVE / OVERNIGHT EVENTS:    ADDITIONAL REVIEW OF SYSTEMS:    MEDICATIONS  (STANDING):  ammonium lactate 12% Lotion 1 Application(s) Topical <User Schedule>  aspirin  chewable 81 milliGRAM(s) Oral daily  atorvastatin 80 milliGRAM(s) Oral at bedtime  calamine/zinc oxide Lotion 1 Application(s) Topical three times a day  epoetin mendoza-epbx (RETACRIT) Injectable 18307 Unit(s) SubCutaneous every 7 days  ferrous    sulfate 325 milliGRAM(s) Oral two times a day  heparin   Injectable 5000 Unit(s) SubCutaneous every 8 hours  insulin glargine Injectable (LANTUS) 18 Unit(s) SubCutaneous at bedtime  insulin lispro (ADMELOG) corrective regimen sliding scale   SubCutaneous three times a day before meals  insulin lispro (ADMELOG) corrective regimen sliding scale   SubCutaneous at bedtime  insulin lispro Injectable (ADMELOG) 2 Unit(s) SubCutaneous three times a day with meals  levothyroxine 75 MICROGram(s) Oral daily  midodrine. 10 milliGRAM(s) Oral every 8 hours  Nephro-molly 1 Tablet(s) Oral daily  senna 2 Tablet(s) Oral at bedtime    MEDICATIONS  (PRN):  polyethylene glycol 3350 17 Gram(s) Oral two times a day PRN Constipation      CAPILLARY BLOOD GLUCOSE      POCT Blood Glucose.: 167 mg/dL (10 May 2024 22:38)  POCT Blood Glucose.: 154 mg/dL (10 May 2024 21:17)  POCT Blood Glucose.: 180 mg/dL (10 May 2024 17:08)  POCT Blood Glucose.: 72 mg/dL (10 May 2024 12:50)  POCT Blood Glucose.: 110 mg/dL (10 May 2024 08:44)    I&O's Summary    10 May 2024 07:01  -  11 May 2024 07:00  --------------------------------------------------------  IN: 600 mL / OUT: 1275 mL / NET: -675 mL        PHYSICAL EXAM:  Vital Signs Last 24 Hrs  T(C): 36.8 (11 May 2024 04:12), Max: 36.8 (11 May 2024 04:12)  T(F): 98.3 (11 May 2024 04:12), Max: 98.3 (11 May 2024 04:12)  HR: 79 (11 May 2024 04:12) (63 - 80)  BP: 131/62 (11 May 2024 04:12) (108/55 - 131/62)  BP(mean): --  RR: 18 (11 May 2024 04:12) (18 - 18)  SpO2: 100% (11 May 2024 04:12) (96% - 100%)    Parameters below as of 11 May 2024 04:12  Patient On (Oxygen Delivery Method): nasal cannula        GENERAL: NAD  HEAD:  Atraumatic, Normocephalic  EYES: EOMI, PERRLA, conjunctiva and sclera clear  ENMT: Moist mucous membranes  NECK: Supple, No JVD, trachea midline   NERVOUS SYSTEM:  Alert & Oriented X3, Good concentration  CHEST/LUNG: Clear to auscultation bilaterally on anterior exam; No rales, rhonchi, wheezing, or rubs  HEART: Regular rate and rhythm; No murmurs, rubs, or gallops  ABDOMEN: Soft, Nontender, Nondistended; Bowel sounds present  EXTREMITIES:  2+ Peripheral Pulses, b/l LE in ACE wrap   SKIN: Healing signs of desquamating rash throughout noted      LABS:                        9.1    9.61  )-----------( 251      ( 10 May 2024 07:13 )             29.4     05-10    135  |  92<L>  |  67<H>  ----------------------------<  110<H>  4.0   |  23  |  3.66<H>    Ca    8.4      10 May 2024 07:13  Phos  5.9     05-10  Mg     2.2     05-10    TPro  6.9  /  Alb  3.2<L>  /  TBili  0.6  /  DBili  x   /  AST  23  /  ALT  8<L>  /  AlkPhos  89  05-09          Urinalysis Basic - ( 10 May 2024 07:13 )    Color: x / Appearance: x / SG: x / pH: x  Gluc: 110 mg/dL / Ketone: x  / Bili: x / Urobili: x   Blood: x / Protein: x / Nitrite: x   Leuk Esterase: x / RBC: x / WBC x   Sq Epi: x / Non Sq Epi: x / Bacteria: x          RADIOLOGY & ADDITIONAL TESTS: Reviewed PROGRESS NOTE:   Authored by Almas Poole MD     Patient is a 73y old  Female who presents with a chief complaint of Syncope (10 May 2024 16:09)      SUBJECTIVE / OVERNIGHT EVENTS:  - No acute events overnight. Feels overall improved this morning.    MEDICATIONS  (STANDING):  ammonium lactate 12% Lotion 1 Application(s) Topical <User Schedule>  aspirin  chewable 81 milliGRAM(s) Oral daily  atorvastatin 80 milliGRAM(s) Oral at bedtime  calamine/zinc oxide Lotion 1 Application(s) Topical three times a day  epoetin mendoza-epbx (RETACRIT) Injectable 53976 Unit(s) SubCutaneous every 7 days  ferrous    sulfate 325 milliGRAM(s) Oral two times a day  heparin   Injectable 5000 Unit(s) SubCutaneous every 8 hours  insulin glargine Injectable (LANTUS) 18 Unit(s) SubCutaneous at bedtime  insulin lispro (ADMELOG) corrective regimen sliding scale   SubCutaneous three times a day before meals  insulin lispro (ADMELOG) corrective regimen sliding scale   SubCutaneous at bedtime  insulin lispro Injectable (ADMELOG) 2 Unit(s) SubCutaneous three times a day with meals  levothyroxine 75 MICROGram(s) Oral daily  midodrine. 10 milliGRAM(s) Oral every 8 hours  Nephro-molly 1 Tablet(s) Oral daily  senna 2 Tablet(s) Oral at bedtime    MEDICATIONS  (PRN):  polyethylene glycol 3350 17 Gram(s) Oral two times a day PRN Constipation      CAPILLARY BLOOD GLUCOSE      POCT Blood Glucose.: 167 mg/dL (10 May 2024 22:38)  POCT Blood Glucose.: 154 mg/dL (10 May 2024 21:17)  POCT Blood Glucose.: 180 mg/dL (10 May 2024 17:08)  POCT Blood Glucose.: 72 mg/dL (10 May 2024 12:50)  POCT Blood Glucose.: 110 mg/dL (10 May 2024 08:44)    I&O's Summary    10 May 2024 07:01  -  11 May 2024 07:00  --------------------------------------------------------  IN: 600 mL / OUT: 1275 mL / NET: -675 mL        PHYSICAL EXAM:  Vital Signs Last 24 Hrs  T(C): 36.8 (11 May 2024 04:12), Max: 36.8 (11 May 2024 04:12)  T(F): 98.3 (11 May 2024 04:12), Max: 98.3 (11 May 2024 04:12)  HR: 79 (11 May 2024 04:12) (63 - 80)  BP: 131/62 (11 May 2024 04:12) (108/55 - 131/62)  BP(mean): --  RR: 18 (11 May 2024 04:12) (18 - 18)  SpO2: 100% (11 May 2024 04:12) (96% - 100%)    Parameters below as of 11 May 2024 04:12  Patient On (Oxygen Delivery Method): nasal cannula        GENERAL: NAD  HEAD:  Atraumatic, Normocephalic  EYES: EOMI, PERRLA, conjunctiva and sclera clear  ENMT: Moist mucous membranes  NECK: Supple, No JVD, trachea midline   NERVOUS SYSTEM:  Alert & Oriented X3, Good concentration  CHEST/LUNG: Clear to auscultation bilaterally on anterior exam; No rales, rhonchi, wheezing, or rubs  HEART: Regular rate and rhythm; No murmurs, rubs, or gallops  ABDOMEN: Soft, Nontender, Nondistended; Bowel sounds present  EXTREMITIES:  2+ Peripheral Pulses, b/l LE in ACE wrap   SKIN: Healing signs of desquamating rash throughout noted      LABS:                        9.1    9.61  )-----------( 251      ( 10 May 2024 07:13 )             29.4     05-10    135  |  92<L>  |  67<H>  ----------------------------<  110<H>  4.0   |  23  |  3.66<H>    Ca    8.4      10 May 2024 07:13  Phos  5.9     05-10  Mg     2.2     05-10    TPro  6.9  /  Alb  3.2<L>  /  TBili  0.6  /  DBili  x   /  AST  23  /  ALT  8<L>  /  AlkPhos  89  05-09          Urinalysis Basic - ( 10 May 2024 07:13 )    Color: x / Appearance: x / SG: x / pH: x  Gluc: 110 mg/dL / Ketone: x  / Bili: x / Urobili: x   Blood: x / Protein: x / Nitrite: x   Leuk Esterase: x / RBC: x / WBC x   Sq Epi: x / Non Sq Epi: x / Bacteria: x          RADIOLOGY & ADDITIONAL TESTS: Reviewed

## 2024-05-11 NOTE — PROGRESS NOTE ADULT - PROBLEM SELECTOR PLAN 4
U/A w/ large LE, 501 WBC, 5 RBC and mod bacteria. Previous urine cx w/ ecoli, pansensitive.     - s/p ceftriaxone (5/8-5/10)   - f/u urine culture U/A w/ large LE, 501 WBC, 5 RBC and mod bacteria. Previous urine cx w/ ecoli, pansensitive.     - s/p ceftriaxone (5/8-5/10)

## 2024-05-11 NOTE — PROGRESS NOTE ADULT - PROBLEM SELECTOR PLAN 3
EF from 4/2024 w/ EF 25%, global LV hypokinesis, moderately dilated LV, grade 2 LV diastolic dysfunction, RA moderately dilated. Home meds: torsemide 20mg qd, eplerenone 25mg qd, toprol XL 100MG qd    - cards following   - wean midodrine as tolerated  - hold home meds, restart GDMT as tolerated  - f/u w/ EP outpatient for CRT with patient EF from 4/2024 w/ EF 25%, global LV hypokinesis, moderately dilated LV, grade 2 LV diastolic dysfunction, RA moderately dilated. Home meds: torsemide 20mg qd, eplerenone 25mg qd, toprol XL 100MG qd    - cards following   - d/c midodrine  - hold home meds, restart GDMT as tolerated  - f/u w/ EP outpatient for CRT

## 2024-05-12 NOTE — PROGRESS NOTE ADULT - PROBLEM SELECTOR PLAN 6
Home regimen: lantus 33u qhs w/ premeal sliding scale. A1c 7.4% in 3/2024.     - endo following, appreciate recs   - continue w/ lantus 18 units at bedtime, admelog 3 units pre-meal  - low dose premeal and bedtime sliding scale  - hold premeal if NPO   - hypoglycemia protocol Home regimen: lantus 33u qhs w/ premeal sliding scale. A1c 7.4% in 3/2024.   multiple hypoglycemic episodes     - endo following, appreciate recs   - decrease lantus to 10 units at bedtime, admelog2 units pre-meal  - low dose premeal and bedtime sliding scale  - hold premeal if NPO   - hypoglycemia protocol

## 2024-05-12 NOTE — PROGRESS NOTE ADULT - ATTENDING COMMENTS
73F PMH: HFrEF (EF 30%), AS, LBBB, HTN, DM1, CKD, hypothyroidism, chronic lymphedema, LELIA (3L home O2) p/w for syncope 2/2 severe AS, s/p TAVR 4/24. Course complicated by contrast induced allergic reaction (had CTA for TAVR eval) and contrast related renal failure (acute on chronic) requiring HD. TAVR c/b VT and vfib requiring shock and flash pulmonary edema causing AHRF requiring intubation.     Hypotensive and hypoglycemic overnight. Midodrine increased, monitor. Insulin decreased, monitor glucose.

## 2024-05-12 NOTE — PROGRESS NOTE ADULT - PROBLEM SELECTOR PLAN 1
DUNCAN secondary to ATN from hypotension and contrast nephropathy. CKD stage 4. s/p CRRT and bumex drip in CICU.   Bun 64->68, Cr 3.08-> 3.57 -> 3.66. Suspect bump in Cr likely in setting of dehydration, poor PO intake given n/v, now s/p 500cc mIVF.     - f/u nephro recs   - strict I's and O's  - renally dose meds, avoid nephrotoxic agents  - [place] for urinary retention  - good urine output DUNCAN secondary to ATN from hypotension and contrast nephropathy. CKD stage 4. s/p CRRT and bumex drip in CICU.    Cr 3.08-> 3.57 -> 3.66 -> 3.48   Improving, hardy for urinary retention (placed 5/10)    - f/u nephro recs   - strict I's and O's  - renally dose meds, avoid nephrotoxic agents

## 2024-05-12 NOTE — PROGRESS NOTE ADULT - ASSESSMENT
73F w HFrEF (EF 30%), mod AS, known LBBB, HTN, IDDM1, CKD, hypothyroidism, chronic lymphedema, suspected OHS/LELIA on 3L NC home O2 p/w 1 episode of syncope. Recent admission at [facility] (3/29-4/5) for ADHF and DUNCAN s/p diuresis, discharged with new home O2 3L NC suspecting OHS/LELIA pending outpatient w/u. Since discharge a week ago, she has been staying at home with   Pt had sob walking to car. Once in car, she was still breathing heavily. Partner noticed pt was not responding to verbal stimuli for 10-15sec and witnessed R arm jerking. Pt gained consciousness quickly without postictal symptoms. Pt went to Cardiologist Dr. Nathan who recommended pt to come to ED. No fever, cp, abd pain, n/v. Leg swelling is improved from prior. Pt on torsemide 40mg which she has been taking. Pt was discharged on 3LNC which she is currently on. Patient reports she did not take Farxiga that she was discharged on as she was concerned about side effects.     In ED, patient was found afebrile, hemodynamically stable, breathing well on 3L NC. Initial labs notable for mild hyponatremia, DUNCAN on CKD, elevated proBNP and elevated pCO2 both improved from prior.  pt also noticed with abn creatinine. had severe AS had ct scan with IV contrast had contrast nephropathy and hypotension ---> CRRT required       1- CKD IV  with DUNCAN   2- CHF   3- lymphedema   4- severe AS  s/p tavr 4/24  5- hypotension     creatinine steady today  s/p ivf 5/9  anemia, trend hgb  retacrit 10,000 units weekly  has + uo is non oliguric  holding HD   hardy removed, TOV but required hardy again for urinary retention   enterococci UTI amoxicillin   midodrine 10 mg tid,  trend bp  strict I/O   trend creatinine and electrolytes daily

## 2024-05-12 NOTE — PROGRESS NOTE ADULT - PROBLEM SELECTOR PLAN 1
- Test BG ac and hs  - Decrease  Lantus to 14 units qhs.   - continue Admelog dose to 2 units TIDAC for now> will adjust as needed  - C/w Low dose admelog correction scale TIDAC, Low dose admelog correction scale QHS  -Will follow  Discharge:  Will be determined according to BG/insulin needs/PO intake  at time of discharge. Basal/bolus insulin doses TBD  - Of note, patient instructed on discharge from recent hospitalization at [place] to start farxiga for CHF. Given risks of DKA of SGLT2i in T1DM, would not start until better data is available for its benefits.  - Follow up with Dr. Luis Dumont outpatient  -Pt will likely required rehab  F/u with optho/renal/cardiac as out pt

## 2024-05-12 NOTE — PROGRESS NOTE ADULT - PROBLEM SELECTOR PLAN 4
U/A w/ large LE, 501 WBC, 5 RBC and mod bacteria. Previous urine cx w/ ecoli, pansensitive.     - s/p ceftriaxone (5/8-5/10) U/A w/ large LE, 501 WBC, 5 RBC and mod bacteria. Urine cx growing 10-49k enterococcus faecalis.     - s/p ceftriaxone (5/8-5/10)  - f/u urine cx

## 2024-05-12 NOTE — PROGRESS NOTE ADULT - NSPROGADDITIONALINFOA_GEN_ALL_CORE
Contact via [employer] Teams during business hours  To reach covering provider access AMION via sun[employer] tools  For Urgent matters/after-hours/weekends/holidays please page endocrine fellow on call   For nonurgent matters please email [email or web address]    Please note that this patient may be followed by different provider tomorrow.  Notify endocrine 24 hours prior to discharge for final recommendations

## 2024-05-12 NOTE — PROGRESS NOTE ADULT - PROBLEM SELECTOR PLAN 12
Likely in setting of CKD vs myelosuppresion in setting of acute illnesses. No obvious source of bleed. Hgb Stable.     - daily CBC  - transfuse for goal Hgb >7  - EPO Likely in setting of CKD vs myelosuppresion in setting of acute illnesses. No obvious source of bleed. Hgb Stable.     - daily CBC  - transfuse for goal Hgb >7  - EPO qweekly

## 2024-05-12 NOTE — PROGRESS NOTE ADULT - NUTRITIONAL ASSESSMENT
This patient has been assessed with a concern for Malnutrition and has been determined to have a diagnosis/diagnoses of Morbid obesity (BMI > 40).    This patient is being managed with:   Diet Consistent Carbohydrate w/Evening Snack-  No Concentrated Potassium  Low Sodium  Entered: May 10 2024  3:36PM

## 2024-05-12 NOTE — PROGRESS NOTE ADULT - SUBJECTIVE AND OBJECTIVE BOX
*******************************  Cristina Ko MD (PGY-1)  Internal Medicine  Contact via [employer] TEAMS  *******************************    EVELYN LOPEZ  73y  Female    Patient is a 73y old  Female who presents with a chief complaint of Syncope (11 May 2024 22:09)      Subjective:    Objective:  T(C): 36.6 (05-12-24 @ 04:10), Max: 37.3 (05-11-24 @ 11:40)  HR: 94 (05-12-24 @ 04:10) (94 - 123)  BP: 108/60 (05-12-24 @ 04:10) (80/40 - 120/58)  RR: 18 (05-12-24 @ 04:10) (18 - 18)  SpO2: 100% (05-12-24 @ 04:10) (100% - 100%)  I&O's Summary    11 May 2024 07:01  -  12 May 2024 07:00  --------------------------------------------------------  IN: 1120 mL / OUT: 1400 mL / NET: -280 mL        PHYSICAL EXAM:  GENERAL: NAD  HEAD:  Atraumatic, Normocephalic  EYES: EOMI, PERRLA, conjunctiva and sclera clear  ENMT: Moist mucous membranes  NECK: Supple, No JVD, trachea midline   NERVOUS SYSTEM:  Alert & Oriented X3, Good concentration; Motor Strength 5/5 B/L upper and lower extremities; DTRs 2+ intact and symmetric  CHEST/LUNG: Clear to auscultation bilaterally; No rales, rhonchi, wheezing, or rubs  HEART: Regular rate and rhythm; No murmurs, rubs, or gallops  ABDOMEN: Soft, Nontender, Nondistended; Bowel sounds present  EXTREMITIES:  2+ Peripheral Pulses, No clubbing, cyanosis, or edema  LYMPH: No lymphadenopathy noted  SKIN: No rashes or lesions    MEDICATIONS  (STANDING):  ammonium lactate 12% Lotion 1 Application(s) Topical <User Schedule>  aspirin  chewable 81 milliGRAM(s) Oral daily  atorvastatin 80 milliGRAM(s) Oral at bedtime  calamine/zinc oxide Lotion 1 Application(s) Topical three times a day  epoetin mendoza-epbx (RETACRIT) Injectable 57874 Unit(s) SubCutaneous every 7 days  ferrous    sulfate 325 milliGRAM(s) Oral two times a day  heparin   Injectable 5000 Unit(s) SubCutaneous every 8 hours  insulin glargine Injectable (LANTUS) 16 Unit(s) SubCutaneous at bedtime  insulin lispro (ADMELOG) corrective regimen sliding scale   SubCutaneous three times a day before meals  insulin lispro (ADMELOG) corrective regimen sliding scale   SubCutaneous at bedtime  insulin lispro Injectable (ADMELOG) 2 Unit(s) SubCutaneous three times a day with meals  levothyroxine 75 MICROGram(s) Oral daily  midodrine 5 milliGRAM(s) Oral three times a day  Nephro-molly 1 Tablet(s) Oral daily  senna 2 Tablet(s) Oral at bedtime    MEDICATIONS  (PRN):  polyethylene glycol 3350 17 Gram(s) Oral two times a day PRN Constipation      LABS:        CAPILLARY BLOOD GLUCOSE      POCT Blood Glucose.: 168 mg/dL (11 May 2024 21:51)  POCT Blood Glucose.: 188 mg/dL (11 May 2024 17:24)  POCT Blood Glucose.: 86 mg/dL (11 May 2024 12:33)  POCT Blood Glucose.: 103 mg/dL (11 May 2024 08:37)      RADIOLOGY & ADDITIONAL TESTS:               *******************************  Cristina Ko MD (PGY-1)  Internal Medicine  Contact via [employer] TEAMS  *******************************    EVELYN LOPEZ  73y  Female    Patient is a 73y old  Female who presents with a chief complaint of Syncope (11 May 2024 22:09)    Subjective: Overnight, patient hypotensive to 80/40, received midodrine 10mg x1 w/ improvement. This morning, patient hypoglycemic. Denied any dizziness, lightheadedness, chest pain, sob, ab pain, n/v. Noted poor appetite.     Objective:  T(C): 36.6 (05-12-24 @ 04:10), Max: 37.3 (05-11-24 @ 11:40)  HR: 94 (05-12-24 @ 04:10) (94 - 123)  BP: 108/60 (05-12-24 @ 04:10) (80/40 - 120/58)  RR: 18 (05-12-24 @ 04:10) (18 - 18)  SpO2: 100% (05-12-24 @ 04:10) (100% - 100%)  I&O's Summary    11 May 2024 07:01  -  12 May 2024 07:00  --------------------------------------------------------  IN: 1120 mL / OUT: 1400 mL / NET: -280 mL    PHYSICAL EXAM:  GENERAL: NAD  HEAD:  Atraumatic, Normocephalic  EYES: EOMI, PERRLA, conjunctiva and sclera clear  ENMT: Moist mucous membranes  NECK: Supple, No JVD, trachea midline   NERVOUS SYSTEM:  Alert & Oriented X3, Good concentration  CHEST/LUNG: Clear to auscultation bilaterally; No rales, rhonchi, wheezing, or rubs  HEART: Regular rate and rhythm; No murmurs, rubs, or gallops  ABDOMEN: Soft, Nontender, Nondistended; Bowel sounds present  EXTREMITIES:  2+ Peripheral Pulses, + b/l edema, No clubbing, cyanosis  SKIN: generalized erythema w/ skin sloughing, nonpruritic     MEDICATIONS  (STANDING):  ammonium lactate 12% Lotion 1 Application(s) Topical <User Schedule>  aspirin  chewable 81 milliGRAM(s) Oral daily  atorvastatin 80 milliGRAM(s) Oral at bedtime  calamine/zinc oxide Lotion 1 Application(s) Topical three times a day  epoetin mendoza-epbx (RETACRIT) Injectable 35644 Unit(s) SubCutaneous every 7 days  ferrous    sulfate 325 milliGRAM(s) Oral two times a day  heparin   Injectable 5000 Unit(s) SubCutaneous every 8 hours  insulin glargine Injectable (LANTUS) 16 Unit(s) SubCutaneous at bedtime  insulin lispro (ADMELOG) corrective regimen sliding scale   SubCutaneous three times a day before meals  insulin lispro (ADMELOG) corrective regimen sliding scale   SubCutaneous at bedtime  insulin lispro Injectable (ADMELOG) 2 Unit(s) SubCutaneous three times a day with meals  levothyroxine 75 MICROGram(s) Oral daily  midodrine 5 milliGRAM(s) Oral three times a day  Nephro-molly 1 Tablet(s) Oral daily  senna 2 Tablet(s) Oral at bedtime    MEDICATIONS  (PRN):  polyethylene glycol 3350 17 Gram(s) Oral two times a day PRN Constipation    LABS:             9.6    8.66  )-----------( 216      ( 12 May 2024 06:57 )             31.5     05-12    134<L>  |  91<L>  |  69<H>  ----------------------------<  41<LL>  3.6   |  26  |  3.48<H>    Ca    9.0      12 May 2024 06:56  Phos  5.1     05-12  Mg     2.4     05-12    Culture Results:   10,000 - 49,000 CFU/mL Enterococcus faecalis (05-10 @ 02:39)  Culture Results:   No growth at 5 days (04-27 @ 12:20)  Culture Results:   No growth at 5 days (04-27 @ 12:19)  Culture Results:   No growth at 5 days (04-25 @ 10:15)  Culture Results:   Growth in aerobic bottle: Staphylococcus epidermidis  Isolation of Coagulase negative Staphylococcus from single blood culture  sets may represent  contamination. Contact the Microbiology Department at [phone] if  susceptibility testing is  clinically indicated.  Direct identification is available within approximately 3-5  hours either by Blood Panel Multiplexed PCR or Direct  MALDI-TOF. Details: [email or web address] (04-25 @ 10:15)  Culture Results:   Rare Yeast (04-16 @ 19:11)  Culture Results:   10,000 - 49,000 CFU/mL Escherichia coli (04-16 @ 14:57)  Culture Results:   No growth at 5 days (04-16 @ 00:28)  Culture Results:   No growth at 5 days (04-16 @ 00:20)    CAPILLARY BLOOD GLUCOSE  POCT Blood Glucose.: 54 mg/dL (12 May 2024 08:43)  POCT Blood Glucose.: 168 mg/dL (11 May 2024 21:51)  POCT Blood Glucose.: 188 mg/dL (11 May 2024 17:24)  POCT Blood Glucose.: 86 mg/dL (11 May 2024 12:33)  POCT Blood Glucose.: 103 mg/dL (11 May 2024 08:37)    RADIOLOGY & ADDITIONAL TESTS:

## 2024-05-12 NOTE — PROGRESS NOTE ADULT - ASSESSMENT
73F w/h/o of T1DM with unknown control due to CKD and anemia of chronic disease. DM c/b CKD. Also h/o HFrEF (EF 30%), mod AS, known LBBB, HTN,  hypothyroidism, chronic lymphedema, suspected OHS/LELIA on 3L NC home O2 p/w 1 episode of syncope with R arm jerking, likely 2/2 severe symptomatic AS. S/p AVR 4/24 c/b DUNCAN on CKD, fever and hypotension. Also  UTI/pul edema/ sepsis and L arenal nodule finding.       Endocrine consult done for DM and adrenal nodule  management. appetite has been variable, was having N/V and did not eat much yesterday. Had hypoglycemia this morning.  Lantus was decreased yesterday as was mealtime doses.  Will decrease Lantus slightly to prevent AM hypoglycemia.   BG goal 100 to 180s.   Per endocrine attending Dr Burton> Adrenal nodule work up completed and pt will need to f/u once she is more stable as out pt.       Home regimen: Lantus 33 units qhs, Novolog based on sliding scale TIDAC normally 3-5 units  Has Dexcom G7

## 2024-05-12 NOTE — PROGRESS NOTE ADULT - PROBLEM SELECTOR PLAN 7
Likely in setting of deconditioning.     - leg compression, abdominal binder   - PT rec HONEY   - OOB as tolerated   - midodrine d/c'ed Likely in setting of deconditioning.     - leg compression, abdominal binder   - PT rec HONEY   - OOB as tolerated   - midodrine 5mg TID

## 2024-05-12 NOTE — PROGRESS NOTE ADULT - PROBLEM SELECTOR PLAN 3
EF from 4/2024 w/ EF 25%, global LV hypokinesis, moderately dilated LV, grade 2 LV diastolic dysfunction, RA moderately dilated. Home meds: torsemide 20mg qd, eplerenone 25mg qd, toprol XL 100MG qd    - cards following   - d/c midodrine  - hold home meds, restart GDMT as tolerated  - f/u w/ EP outpatient for CRT EF from 4/2024 w/ EF 25%, global LV hypokinesis, moderately dilated LV, grade 2 LV diastolic dysfunction, RA moderately dilated. Home meds: torsemide 20mg qd, eplerenone 25mg qd, toprol XL 100MG qd    - cards following   - continue midodrine 5mg TID   - hold home meds, restart GDMT as tolerated  - f/u w/ EP outpatient for CRT

## 2024-05-12 NOTE — PROGRESS NOTE ADULT - SUBJECTIVE AND OBJECTIVE BOX
seen earlier today     Chief Complaint: Diabetes Mellitus follow up    INTERVAL HX:  Patient states she is feeling better today, no nausea or vomiting.  States she did not eat much yesterday. Was hypoglycemic this morning despite doses being adjusted yesterday.      Review of Systems:  General: As above  GI: No nausea, vomiting  Endocrine: no  S&Sx of hypoglycemia    Allergies    contrast media (iodine-based) (Fever; Vomiting; Flushing; Hypotension; Rash; Stomach Upset)  Ativan (Rash; Urticaria)  penicillins (Hives (Mod to Severe); Anaphylaxis (Mod to Severe); Short breath (Mod to Severe); Angioedema (Mod to Severe))    Intolerances      MEDICATIONS  (STANDING):  ammonium lactate 12% Lotion 1 Application(s) Topical <User Schedule>  aspirin  chewable 81 milliGRAM(s) Oral daily  atorvastatin 80 milliGRAM(s) Oral at bedtime  calamine/zinc oxide Lotion 1 Application(s) Topical three times a day  dextrose 10% Bolus 125 milliLiter(s) IV Bolus once  dextrose 5%. 1000 milliLiter(s) (100 mL/Hr) IV Continuous <Continuous>  dextrose 5%. 1000 milliLiter(s) (50 mL/Hr) IV Continuous <Continuous>  dextrose 50% Injectable 25 Gram(s) IV Push once  dextrose 50% Injectable 12.5 Gram(s) IV Push once  epoetin mendoza-epbx (RETACRIT) Injectable 41336 Unit(s) SubCutaneous every 7 days  ferrous    sulfate 325 milliGRAM(s) Oral two times a day  glucagon  Injectable 1 milliGRAM(s) IntraMuscular once  heparin   Injectable 5000 Unit(s) SubCutaneous every 8 hours  insulin glargine Injectable (LANTUS) 10 Unit(s) SubCutaneous at bedtime  insulin lispro (ADMELOG) corrective regimen sliding scale   SubCutaneous three times a day before meals  insulin lispro (ADMELOG) corrective regimen sliding scale   SubCutaneous at bedtime  insulin lispro Injectable (ADMELOG) 2 Unit(s) SubCutaneous three times a day with meals  levothyroxine 75 MICROGram(s) Oral daily  midodrine 10 milliGRAM(s) Oral three times a day  Nephro-molly 1 Tablet(s) Oral daily  senna 2 Tablet(s) Oral at bedtime        atorvastatin   80 milliGRAM(s) Oral (05-11-24 @ 21:09)    dextrose 50% Injectable   12.5 Gram(s) IV Push (05-12-24 @ 08:56)    insulin glargine Injectable (LANTUS)   16 Unit(s) SubCutaneous (05-11-24 @ 22:05)    insulin lispro (ADMELOG) corrective regimen sliding scale   1 Unit(s) SubCutaneous (05-11-24 @ 17:44)    insulin lispro Injectable (ADMELOG)   2 Unit(s) SubCutaneous (05-11-24 @ 17:43)    levothyroxine   75 MICROGram(s) Oral (05-12-24 @ 07:02)        PHYSICAL EXAM:  VITALS: T(C): 36.6 (05-12-24 @ 04:10)  T(F): 97.9 (05-12-24 @ 04:10), Max: 97.9 (05-12-24 @ 04:10)  HR: 94 (05-12-24 @ 04:10) (94 - 96)  BP: 108/60 (05-12-24 @ 04:10) (80/40 - 114/55)  RR:  (18 - 18)  SpO2:  (100% - 100%)  Wt(kg): --  GENERAL: NAD  Respiratory: Respirations unlabored   Extremities: Warm, generalized edema  NEURO: Alert , appropriate     LABS:  POCT Blood Glucose.: 115 mg/dL (05-12-24 @ 09:31)  POCT Blood Glucose.: 54 mg/dL (05-12-24 @ 08:43)  POCT Blood Glucose.: 54 mg/dL (05-12-24 @ 08:41)  POCT Blood Glucose.: 56 mg/dL (05-12-24 @ 08:21)  POCT Blood Glucose.: 53 mg/dL (05-12-24 @ 08:18)  POCT Blood Glucose.: 168 mg/dL (05-11-24 @ 21:51)  POCT Blood Glucose.: 188 mg/dL (05-11-24 @ 17:24)  POCT Blood Glucose.: 86 mg/dL (05-11-24 @ 12:33)  POCT Blood Glucose.: 103 mg/dL (05-11-24 @ 08:37)  POCT Blood Glucose.: 167 mg/dL (05-10-24 @ 22:38)  POCT Blood Glucose.: 154 mg/dL (05-10-24 @ 21:17)  POCT Blood Glucose.: 180 mg/dL (05-10-24 @ 17:08)  POCT Blood Glucose.: 72 mg/dL (05-10-24 @ 12:50)  POCT Blood Glucose.: 110 mg/dL (05-10-24 @ 08:44)  POCT Blood Glucose.: 203 mg/dL (05-09-24 @ 21:57)  POCT Blood Glucose.: 130 mg/dL (05-09-24 @ 17:40)  POCT Blood Glucose.: 99 mg/dL (05-09-24 @ 13:18)                          9.6    8.66  )-----------( 216      ( 12 May 2024 06:57 )             31.5     05-12    134<L>  |  91<L>  |  69<H>  ----------------------------<  41<LL>  3.6   |  26  |  3.48<H>    Ca    9.0      12 May 2024 06:56  Phos  5.1     05-12  Mg     2.4     05-12      eGFR: 13 mL/min/1.73m2 (12 May 2024 06:56)    04-13 Chol 74 Direct LDL -- LDL calculated 25 HDL 33<L> Trig 70  Thyroid Function Tests:  04-14 @ 07:18 TSH 5.06 FreeT4 1.3 T3 -- Anti TPO -- Anti Thyroglobulin Ab -- TSI --  04-13 @ 07:05 TSH 4.79 FreeT4 1.5 T3 -- Anti TPO -- Anti Thyroglobulin Ab -- TSI --      A1C with Estimated Average Glucose Result: 7.4 % (03-30-24 @ 08:21)    Estimated Average Glucose: 166 mg/dL (03-30-24 @ 08:21)        Diet, Consistent Carbohydrate w/Evening Snack:   No Concentrated Potassium  Low Sodium (05-10-24 @ 15:36) [Active]

## 2024-05-12 NOTE — PROGRESS NOTE ADULT - SUBJECTIVE AND OBJECTIVE BOX
[facility]   [phone]  RENAL FOLLOW UP NOTE  --------------------------------------------------------------------------------  Chief Complaint:    24 hour events/subjective:    seen earlier   states tolerating po   no nausea   no dysuria   has hardy for urinary retention     PAST HISTORY  --------------------------------------------------------------------------------  No significant changes to PMH, PSH, FHx, SHx, unless otherwise noted    ALLERGIES & MEDICATIONS  --------------------------------------------------------------------------------  Allergies    contrast media (iodine-based) (Fever; Vomiting; Flushing; Hypotension; Rash; Stomach Upset)  Ativan (Rash; Urticaria)  penicillins (Hives (Mod to Severe); Anaphylaxis (Mod to Severe); Short breath (Mod to Severe); Angioedema (Mod to Severe))    Intolerances      Standing Inpatient Medications  ammonium lactate 12% Lotion 1 Application(s) Topical <User Schedule>  aspirin  chewable 81 milliGRAM(s) Oral daily  atorvastatin 80 milliGRAM(s) Oral at bedtime  calamine/zinc oxide Lotion 1 Application(s) Topical three times a day  dextrose 10% Bolus 125 milliLiter(s) IV Bolus once  dextrose 5%. 1000 milliLiter(s) IV Continuous <Continuous>  dextrose 5%. 1000 milliLiter(s) IV Continuous <Continuous>  dextrose 50% Injectable 25 Gram(s) IV Push once  dextrose 50% Injectable 12.5 Gram(s) IV Push once  epoetin mendoza-epbx (RETACRIT) Injectable 81838 Unit(s) SubCutaneous every 7 days  ferrous    sulfate 325 milliGRAM(s) Oral two times a day  glucagon  Injectable 1 milliGRAM(s) IntraMuscular once  heparin   Injectable 5000 Unit(s) SubCutaneous every 8 hours  insulin glargine Injectable (LANTUS) 10 Unit(s) SubCutaneous at bedtime  insulin lispro (ADMELOG) corrective regimen sliding scale   SubCutaneous three times a day before meals  insulin lispro (ADMELOG) corrective regimen sliding scale   SubCutaneous at bedtime  insulin lispro Injectable (ADMELOG) 2 Unit(s) SubCutaneous three times a day with meals  levothyroxine 75 MICROGram(s) Oral daily  midodrine 10 milliGRAM(s) Oral three times a day  Nephro-molly 1 Tablet(s) Oral daily  senna 2 Tablet(s) Oral at bedtime    PRN Inpatient Medications  dextrose Oral Gel 15 Gram(s) Oral once PRN  polyethylene glycol 3350 17 Gram(s) Oral two times a day PRN      REVIEW OF SYSTEMS  --------------------------------------------------------------------------------    Gen: denies  fevers/chills,  CVS: denies chest pain/palpitations  Resp: denies SOB/Cough  GI: Denies N/V/Abd pain  : Denies dysuria/    VITALS/PHYSICAL EXAM  --------------------------------------------------------------------------------  T(C): 36.6 (05-12-24 @ 13:36), Max: 36.6 (05-11-24 @ 20:49)  HR: 78 (05-12-24 @ 13:36) (78 - 96)  BP: 122/63 (05-12-24 @ 13:36) (80/40 - 122/63)  RR: 18 (05-12-24 @ 13:36) (18 - 18)  SpO2: 93% (05-12-24 @ 13:36) (93% - 100%)  Wt(kg): --        05-11-24 @ 07:01  -  05-12-24 @ 07:00  --------------------------------------------------------  IN: 1120 mL / OUT: 1400 mL / NET: -280 mL    05-12-24 @ 07:01  -  05-12-24 @ 20:36  --------------------------------------------------------  IN: 120 mL / OUT: 0 mL / NET: 120 mL      Physical Exam:  	                Gen: comfortable appearing   	Pulm: decrease bs  no rales or ronchi or wheezing  	CV: No JVD. RRR, S1S2; no rub II/VI M   	Abd: +BS, soft, nontender/nondistended, obese   	UE: Warm, no cyanosis  no clubbing, no edema  	LE: Warm, no cyanosis  no clubbing, 1+ softer edema,  	Neuro: alert and oriented       LABS/STUDIES  --------------------------------------------------------------------------------              9.6    8.66  >-----------<  216      [05-12-24 @ 06:57]              31.5     134  |  91  |  69  ----------------------------<  41      [05-12-24 @ 06:56]  3.6   |  26  |  3.48        Ca     9.0     [05-12-24 @ 06:56]      Mg     2.4     [05-12-24 @ 06:56]      Phos  5.1     [05-12-24 @ 06:56]            Creatinine Trend:  SCr 3.48 [05-12 @ 06:56]  SCr 3.50 [05-11 @ 07:08]  SCr 3.66 [05-10 @ 07:13]  SCr 3.57 [05-09 @ 10:10]  SCr 3.08 [05-08 @ 00:26]        PTH -- (Ca 8.4)      [04-19-24 @ 17:54]   109  TSH 5.06      [04-14-24 @ 07:18]  Lipid: chol 74, TG 70, HDL 33, LDL --      [04-13-24 @ 07:05]

## 2024-05-13 NOTE — PROGRESS NOTE ADULT - PROBLEM SELECTOR PLAN 11
on 3L NC at home. Not on CPAP. Hospital course complicated w/ AHRF 2/2 to flash pulm edema requiring intubation. Now extubated and stable on NC    - continue to monitor on 3L NC at home. Not on CPAP. Hospital course complicated w/ AHRF 2/2 to flash pulm edema requiring intubation. Now extubated and stable on NC    - continue to monitor.

## 2024-05-13 NOTE — PROGRESS NOTE ADULT - PROBLEM SELECTOR PLAN 7
Likely in setting of deconditioning.     - leg compression, abdominal binder   - PT rec HONEY   - OOB as tolerated   - midodrine 5mg TID Likely in setting of deconditioning.     - leg compression, abdominal binder   - PT rec HONEY   - OOB as tolerated   - midodrine 10mg TID. Likely in setting of deconditioning. Still present    - leg compression, abdominal binder   - PT rec HONEY   - OOB as tolerated   - midodrine 10mg TID.  - May be volume down

## 2024-05-13 NOTE — PROGRESS NOTE ADULT - SUBJECTIVE AND OBJECTIVE BOX
***************************************************************  Jairo Kaufman, PG1  Internal Medicine   Pager:  TEAMS  ***************************************************************    EVELYN LOPEZ  73y  MRN: [id number]    Patient is a 73y old  Female who presents with a chief complaint of Syncope (13 May 2024 06:54)      Interval/Overnight Events: no events ON.     Subjective: Pt seen and examined at bedside. Denies fever, CP, SOB, abn pain, N/V. Tolerating diet.  Tired    MEDICATIONS  (STANDING):  ammonium lactate 12% Lotion 1 Application(s) Topical <User Schedule>  aspirin  chewable 81 milliGRAM(s) Oral daily  atorvastatin 80 milliGRAM(s) Oral at bedtime  calamine/zinc oxide Lotion 1 Application(s) Topical three times a day  dextrose 10% Bolus 125 milliLiter(s) IV Bolus once  dextrose 5%. 1000 milliLiter(s) (100 mL/Hr) IV Continuous <Continuous>  dextrose 5%. 1000 milliLiter(s) (50 mL/Hr) IV Continuous <Continuous>  dextrose 50% Injectable 25 Gram(s) IV Push once  dextrose 50% Injectable 12.5 Gram(s) IV Push once  epoetin mendoza-epbx (RETACRIT) Injectable 99192 Unit(s) SubCutaneous every 7 days  ferrous    sulfate 325 milliGRAM(s) Oral two times a day  glucagon  Injectable 1 milliGRAM(s) IntraMuscular once  heparin   Injectable 5000 Unit(s) SubCutaneous every 8 hours  insulin glargine Injectable (LANTUS) 10 Unit(s) SubCutaneous at bedtime  insulin lispro (ADMELOG) corrective regimen sliding scale   SubCutaneous three times a day before meals  insulin lispro (ADMELOG) corrective regimen sliding scale   SubCutaneous at bedtime  insulin lispro Injectable (ADMELOG) 2 Unit(s) SubCutaneous three times a day with meals  levothyroxine 75 MICROGram(s) Oral daily  midodrine 10 milliGRAM(s) Oral three times a day  Nephro-molly 1 Tablet(s) Oral daily  senna 2 Tablet(s) Oral at bedtime    MEDICATIONS  (PRN):  dextrose Oral Gel 15 Gram(s) Oral once PRN Blood Glucose LESS THAN 70 milliGRAM(s)/deciliter  polyethylene glycol 3350 17 Gram(s) Oral two times a day PRN Constipation      Objective:    Vitals: Vital Signs Last 24 Hrs  T(C): 37 (05-13-24 @ 04:09), Max: 37 (05-13-24 @ 04:09)  T(F): 98.6 (05-13-24 @ 04:09), Max: 98.6 (05-13-24 @ 04:09)  HR: 86 (05-13-24 @ 04:09) (78 - 86)  BP: 120/60 (05-13-24 @ 04:09) (119/61 - 122/63)  BP(mean): --  RR: 18 (05-13-24 @ 04:09) (18 - 18)  SpO2: 100% (05-13-24 @ 04:09) (93% - 100%)                I&O's Summary    12 May 2024 07:01  -  13 May 2024 07:00  --------------------------------------------------------  IN: 320 mL / OUT: 1300 mL / NET: -980 mL        PHYSICAL EXAM:  GENERAL: NAD  HEAD:  Atraumatic, Normocephalic  EYES: EOMI, PERRL, conjunctiva and sclera clear  ENMT: Moist mucous membranes  NECK: Supple, No JVD  CHEST/LUNG: Clear to auscultation bilaterally; No rales, rhonchi, wheezing, or rubs  HEART: Regular rate and rhythm, 4/6 systolic murmur  ABDOMEN: Soft, Nontender, Nondistended; Bowel sounds present  EXTREMITIES:  2+ Peripheral Pulses, 3+ b/l edema up to thighs, No clubbing, cyanosis  SKIN: generalized erythema w/ skin sloughing and desquamation, nonpruritic.  Bullae on shins  NERVOUS SYSTEM:  Alert & Oriented X3, Good concentration  PSYCH:  Tired, appropriate affect    LABS:                        9.4    7.02  )-----------( 194      ( 13 May 2024 06:53 )             30.5                         9.6    8.66  )-----------( 216      ( 12 May 2024 06:57 )             31.5                         9.6    10.53 )-----------( 237      ( 11 May 2024 07:08 )             31.7     05-13    135  |  92<L>  |  75<H>  ----------------------------<  253<H>  3.9   |  25  |  3.47<H>  05-12    134<L>  |  91<L>  |  69<H>  ----------------------------<  41<LL>  3.6   |  26  |  3.48<H>  05-11    133<L>  |  91<L>  |  66<H>  ----------------------------<  90  3.8   |  25  |  3.50<H>    Ca    9.0      13 May 2024 06:53  Ca    9.0      12 May 2024 06:56  Ca    8.6      11 May 2024 07:08  Phos  5.1     05-13  Mg     2.4     05-13      CAPILLARY BLOOD GLUCOSE      POCT Blood Glucose.: 275 mg/dL (12 May 2024 21:41)  POCT Blood Glucose.: 283 mg/dL (12 May 2024 18:24)  POCT Blood Glucose.: 263 mg/dL (12 May 2024 17:11)  POCT Blood Glucose.: 267 mg/dL (12 May 2024 13:49)  POCT Blood Glucose.: 115 mg/dL (12 May 2024 09:31)  POCT Blood Glucose.: 54 mg/dL (12 May 2024 08:43)  POCT Blood Glucose.: 54 mg/dL (12 May 2024 08:41)        Urinalysis Basic - ( 13 May 2024 06:53 )    Color: x / Appearance: x / SG: x / pH: x  Gluc: 253 mg/dL / Ketone: x  / Bili: x / Urobili: x   Blood: x / Protein: x / Nitrite: x   Leuk Esterase: x / RBC: x / WBC x   Sq Epi: x / Non Sq Epi: x / Bacteria: x          RADIOLOGY & ADDITIONAL TESTS:    Imaging Personally Reviewed:  [x ] YES  [ ] NO    Consultants involved in case:   Consultant(s) Notes Reviewed:  [ x] YES  [ ] NO:   Care Discussed with Consultants/Other Providers [x ] YES  [ ] NO

## 2024-05-13 NOTE — PROGRESS NOTE ADULT - PROBLEM SELECTOR PLAN 6
Home regimen: lantus 33u qhs w/ premeal sliding scale. A1c 7.4% in 3/2024.   multiple hypoglycemic episodes     - endo following, appreciate recs   - decrease lantus to 10 units at bedtime, admelog2 units pre-meal  - low dose premeal and bedtime sliding scale  - hold premeal if NPO   - hypoglycemia protocol Home regimen: lantus 33u qhs w/ premeal sliding scale. A1c 7.4% in 3/2024. multiple hypoglycemic episodes     - endo following, appreciate recs   - Lantus 10 units at bedtime, admelog 2 units pre-meal  - low dose premeal and bedtime sliding scale  - hold premeal if NPO   - hypoglycemia protocol.

## 2024-05-13 NOTE — PROGRESS NOTE ADULT - PROBLEM SELECTOR PLAN 1
DUNCAN secondary to ATN from hypotension and contrast nephropathy. CKD stage 4. s/p CRRT and bumex drip in CICU.    Cr 3.08-> 3.57 -> 3.66 -> 3.48   Improving, hardy for urinary retention (placed 5/10)    - f/u nephro recs   - strict I's and O's  - renally dose meds, avoid nephrotoxic agents DUNCAN secondary to ATN from hypotension and contrast nephropathy. CKD stage 4. s/p CRRT and bumex drip in CICU.  Cr 3.08-> 3.57 -> 3.66 -> 3.48.  Improving, hardy for urinary retention (placed 5/10)    - f/u nephro recs   - strict I's and O's  - renally dose meds, avoid nephrotoxic agents.

## 2024-05-13 NOTE — PROGRESS NOTE ADULT - SUBJECTIVE AND OBJECTIVE BOX
DIABETES FOLLOW UP NOTE: Saw pt earlier today    Chief Complaint: Endocrine consult requested for management of DM    INTERVAL HX: Pt stable, reports improving PO intake. No complaints verbalized today. BG levels above goal. Had fasting hypoglycemia yesterday but fasting hyperglycemia today. Also prandial hyperglycemia as pt is eating better. No further hypoglycemia.      Review of Systems:  General: As above  Cardiovascular: No chest pain, palpitations  Respiratory: No SOB, no cough  GI: No nausea, vomiting, abdominal pain  Endocrine: No S&Sx of hypoglycemia    Allergies    contrast media (iodine-based) (Fever; Vomiting; Flushing; Hypotension; Rash; Stomach Upset)  Ativan (Rash; Urticaria)  penicillins (Hives (Mod to Severe); Anaphylaxis (Mod to Severe); Short breath (Mod to Severe); Angioedema (Mod to Severe))    Intolerances      MEDICATIONS:  atorvastatin 80 milliGRAM(s) Oral at bedtime  insulin glargine Injectable (LANTUS) 10 Unit(s) SubCutaneous at bedtime  insulin lispro (ADMELOG) corrective regimen sliding scale   SubCutaneous three times a day before meals  insulin lispro (ADMELOG) corrective regimen sliding scale   SubCutaneous at bedtime  insulin lispro Injectable (ADMELOG) 5 Unit(s) SubCutaneous three times a day with meals  levothyroxine 75 MICROGram(s) Oral daily      PHYSICAL EXAM:  VITALS: T(C): 37 (05-13-24 @ 04:09)  T(F): 98.6 (05-13-24 @ 04:09), Max: 98.6 (05-13-24 @ 04:09)  HR: 81 (05-13-24 @ 11:45) (81 - 86)  BP: 110/65 (05-13-24 @ 11:45) (110/65 - 120/60)  RR:  (18 - 18)  SpO2:  (100% - 100%)  Wt(kg): --  GENERAL: Female laying in bed in NAD.   Abdomen: Soft, nontender, non distended.  Extremities: Warm,+ edema in LE with dressings on D&I.  NEURO: A&O X3    LABS:  POCT Blood Glucose.: 287 mg/dL (05-13-24 @ 12:49)  POCT Blood Glucose.: 284 mg/dL (05-13-24 @ 08:41)  POCT Blood Glucose.: 275 mg/dL (05-12-24 @ 21:41)  POCT Blood Glucose.: 283 mg/dL (05-12-24 @ 18:24)  POCT Blood Glucose.: 263 mg/dL (05-12-24 @ 17:11)  POCT Blood Glucose.: 267 mg/dL (05-12-24 @ 13:49)  POCT Blood Glucose.: 115 mg/dL (05-12-24 @ 09:31)  POCT Blood Glucose.: 54 mg/dL (05-12-24 @ 08:43)  POCT Blood Glucose.: 54 mg/dL (05-12-24 @ 08:41)  POCT Blood Glucose.: 56 mg/dL (05-12-24 @ 08:21)  POCT Blood Glucose.: 53 mg/dL (05-12-24 @ 08:18)  POCT Blood Glucose.: 168 mg/dL (05-11-24 @ 21:51)  POCT Blood Glucose.: 188 mg/dL (05-11-24 @ 17:24)  POCT Blood Glucose.: 86 mg/dL (05-11-24 @ 12:33)  POCT Blood Glucose.: 103 mg/dL (05-11-24 @ 08:37)  POCT Blood Glucose.: 167 mg/dL (05-10-24 @ 22:38)  POCT Blood Glucose.: 154 mg/dL (05-10-24 @ 21:17)  POCT Blood Glucose.: 180 mg/dL (05-10-24 @ 17:08)                            9.4    7.02  )-----------( 194      ( 13 May 2024 06:53 )             30.5       05-13    135  |  92<L>  |  75<H>  ----------------------------<  253<H>  3.9   |  25  |  3.47<H>    eGFR: 13<L>    Ca    9.0      05-13  Mg     2.4     05-13  Phos  5.1     05-13      Thyroid Function Tests:  04-14 @ 07:18 TSH 5.06 FreeT4 1.3 T3 -- Anti TPO -- Anti Thyroglobulin Ab -- TSI --      A1C with Estimated Average Glucose Result: 7.4 % (03-30-24 @ 08:21)      Estimated Average Glucose: 166 mg/dL (03-30-24 @ 08:21)        04-13 Chol 74 Direct LDL -- LDL calculated 25 HDL 33<L> Trig 70

## 2024-05-13 NOTE — PROGRESS NOTE ADULT - PROBLEM SELECTOR PLAN 1
- Test BG ac and hs  - Continue with Lantus 18 units qhs for now. If BG <100s in an decrease to 16 units  - Changed Admelog dose to 5 units TIDAC for now> will adjust as needed  - C/w Low dose admelog correction scale TIDAC, Low dose admelog correction scale QHS  -Will follow  Discharge:  Will be determined according to BG/insulin needs/PO intake  at time of discharge. Basal/bolus insulin doses TBD  - Of note, patient instructed on discharge from recent hospitalization at [place] to start farxiga for CHF. Given risks of DKA of SGLT2i in T1DM, would not start until better data is available for its benefits.  - Follow up with Dr. Luis Dumont outpatient  -Pt will likely required rehab  F/u with optho/renal/cardiac as out pt - Test BG ac and hs  - Continue with Lantus 10 units qhs for now.   - Changed Admelog dose to 5 units TIDAC for now> will adjust as needed  - C/w Low dose admelog correction scale TIDAC, Low dose admelog correction scale QHS  -Will follow  Discharge:  Will be determined according to BG/insulin needs/PO intake  at time of discharge. Basal/bolus insulin doses TBD  - Of note, patient instructed on discharge from recent hospitalization at [place] to start farxiga for CHF. Given risks of DKA of SGLT2i in T1DM, would not start until better data is available for its benefits.  - Follow up with Dr. Luis Dumont outpatient  -Pt will likely required rehab  F/u with optho/renal/cardiac as out pt

## 2024-05-13 NOTE — PROGRESS NOTE ADULT - ATTENDING COMMENTS
73F PMH: HFrEF (EF 30%), AS, LBBB, HTN, DM1, CKD, hypothyroidism, chronic lymphedema, LELIA (3L home O2) p/w for syncope 2/2 severe AS, s/p TAVR 4/24. Course complicated by contrast induced allergic reaction (had CTA for TAVR eval) and contrast related renal failure (acute on chronic) requiring HD. TAVR c/b VT and vfib requiring shock and flash pulmonary edema causing AHRF requiring intubation. Course as documented.     Significantly orthostatic today- fluids if okay w Cardiology. Increase Midodrine, monitor.

## 2024-05-13 NOTE — PROGRESS NOTE ADULT - SUBJECTIVE AND OBJECTIVE BOX
[facility] - Howard Kimble, Carlos Luong, Herman Alston  Office Number:  [phone]    Patient resting comfortably in bed in NAD.  Laying flat with no respiratory distress.  No complaints of chest pain, dyspnea, palpitations, PND, or orthopnea.  still dizzy with positional change    ROS: negative unless otherwise mentioned.    Telemetry:  sr    MEDICATIONS  (STANDING):  ammonium lactate 12% Lotion 1 Application(s) Topical <User Schedule>  aspirin  chewable 81 milliGRAM(s) Oral daily  atorvastatin 80 milliGRAM(s) Oral at bedtime  calamine/zinc oxide Lotion 1 Application(s) Topical three times a day  dextrose 10% Bolus 125 milliLiter(s) IV Bolus once  dextrose 5%. 1000 milliLiter(s) (100 mL/Hr) IV Continuous <Continuous>  dextrose 5%. 1000 milliLiter(s) (50 mL/Hr) IV Continuous <Continuous>  dextrose 50% Injectable 25 Gram(s) IV Push once  dextrose 50% Injectable 12.5 Gram(s) IV Push once  epoetin mendoza-epbx (RETACRIT) Injectable 26078 Unit(s) SubCutaneous every 7 days  ferrous    sulfate 325 milliGRAM(s) Oral two times a day  glucagon  Injectable 1 milliGRAM(s) IntraMuscular once  heparin   Injectable 5000 Unit(s) SubCutaneous every 8 hours  insulin glargine Injectable (LANTUS) 10 Unit(s) SubCutaneous at bedtime  insulin lispro (ADMELOG) corrective regimen sliding scale   SubCutaneous three times a day before meals  insulin lispro (ADMELOG) corrective regimen sliding scale   SubCutaneous at bedtime  insulin lispro Injectable (ADMELOG) 2 Unit(s) SubCutaneous three times a day with meals  levothyroxine 75 MICROGram(s) Oral daily  midodrine 10 milliGRAM(s) Oral three times a day  Nephro-molly 1 Tablet(s) Oral daily  senna 2 Tablet(s) Oral at bedtime    MEDICATIONS  (PRN):  dextrose Oral Gel 15 Gram(s) Oral once PRN Blood Glucose LESS THAN 70 milliGRAM(s)/deciliter  polyethylene glycol 3350 17 Gram(s) Oral two times a day PRN Constipation      Allergies    contrast media (iodine-based) (Fever; Vomiting; Flushing; Hypotension; Rash; Stomach Upset)  Ativan (Rash; Urticaria)  penicillins (Hives (Mod to Severe); Anaphylaxis (Mod to Severe); Short breath (Mod to Severe); Angioedema (Mod to Severe))    Intolerances        Vital Signs Last 24 Hrs  T(C): 37 (13 May 2024 04:09), Max: 37 (13 May 2024 04:09)  T(F): 98.6 (13 May 2024 04:09), Max: 98.6 (13 May 2024 04:09)  HR: 86 (13 May 2024 04:09) (78 - 86)  BP: 120/60 (13 May 2024 04:09) (119/61 - 122/63)  BP(mean): --  RR: 18 (13 May 2024 04:09) (18 - 18)  SpO2: 100% (13 May 2024 04:09) (93% - 100%)    Parameters below as of 13 May 2024 04:09  Patient On (Oxygen Delivery Method): nasal cannula  O2 Flow (L/min): 2      I&O's Summary    12 May 2024 07:01  -  13 May 2024 07:00  --------------------------------------------------------  IN: 320 mL / OUT: 1300 mL / NET: -980 mL        ON EXAM:    Gneral: NAD, awake and alert, oriented x 3, obese  HEENT: Mucous membranes are moist, anicteric  Lungs: Non-labored, breath sounds are decreased bilaterally, No wheezing, rales or rhonchi  Cardiovascular: Regular, S1 and S2, no murmurs, rubs, or gallops  Gastrointestinal: Bowel Sounds present, soft, nontender.   Lymph: chronic peripheral edema, with wrapped legs. No lymphadenopathy.  Skin: rash improving  Psych:  Mood & affect appropriate      LABS: All Labs Reviewed:                        9.4    7.02  )-----------( 194      ( 13 May 2024 06:53 )             30.5                         9.6    8.66  )-----------( 216      ( 12 May 2024 06:57 )             31.5                         9.6    10.53 )-----------( 237      ( 11 May 2024 07:08 )             31.7     13 May 2024 06:53    135    |  92     |  75     ----------------------------<  253    3.9     |  25     |  3.47   12 May 2024 06:56    134    |  91     |  69     ----------------------------<  41     3.6     |  26     |  3.48   11 May 2024 07:08    133    |  91     |  66     ----------------------------<  90     3.8     |  25     |  3.50     Ca    9.0        13 May 2024 06:53  Ca    9.0        12 May 2024 06:56  Ca    8.6        11 May 2024 07:08  Phos  5.1       13 May 2024 06:53  Phos  5.1       12 May 2024 06:56  Phos  5.4       11 May 2024 07:08  Mg     2.4       13 May 2024 06:53  Mg     2.4       12 May 2024 06:56  Mg     2.3       11 May 2024 07:08            Blood Culture: Organism Enterococcus faecalis (vancomycin resistant)  Gram Stain Blood -- Gram Stain --  Specimen Source Clean Catch Clean Catch (Midstream)  Culture-Blood --

## 2024-05-13 NOTE — PROGRESS NOTE ADULT - NSPROGADDITIONALINFOA_GEN_ALL_CORE
-Plan discussed with pt/team.  Contact info: [phone] (24/7). pager [phone]  Amion on [place]  Teams on M-T-W-F. Unavailable Thu/Weekends/Holidays  Assessed pt/labs/meds and discussed plan of care with primary team  Adjusting insulin  Discharge plan  Follow up care

## 2024-05-13 NOTE — PROGRESS NOTE ADULT - PROBLEM SELECTOR PLAN 4
U/A w/ large LE, 501 WBC, 5 RBC and mod bacteria. Urine cx growing 10-49k enterococcus faecalis.     - s/p ceftriaxone (5/8-5/10)  - f/u urine cx U/A w/ large LE, 501 WBC, 5 RBC and mod bacteria. Urine cx growing 10-49k Vancomycin resistant enterococcus faecalis.     - s/p ceftriaxone (5/8-5/10) U/A w/ large LE, 501 WBC, 5 RBC and mod bacteria. Urine cx growing 10-49k Vancomycin resistant enterococcus faecalis. Will hold on amoxicillin given asymptomatic nature of UTI    - s/p ceftriaxone (5/8-5/10)

## 2024-05-13 NOTE — PROGRESS NOTE ADULT - SUBJECTIVE AND OBJECTIVE BOX
[facility]   [phone]  RENAL FOLLOW UP NOTE  --------------------------------------------------------------------------------  Chief Complaint:    24 hour events/subjective:    seen earlier   denies dysuria   denies nausea     PAST HISTORY  --------------------------------------------------------------------------------  No significant changes to PMH, PSH, FHx, SHx, unless otherwise noted    ALLERGIES & MEDICATIONS  --------------------------------------------------------------------------------  Allergies    contrast media (iodine-based) (Fever; Vomiting; Flushing; Hypotension; Rash; Stomach Upset)  Ativan (Rash; Urticaria)  penicillins (Hives (Mod to Severe); Anaphylaxis (Mod to Severe); Short breath (Mod to Severe); Angioedema (Mod to Severe))    Intolerances      Standing Inpatient Medications  ammonium lactate 12% Lotion 1 Application(s) Topical <User Schedule>  aspirin  chewable 81 milliGRAM(s) Oral daily  atorvastatin 80 milliGRAM(s) Oral at bedtime  calamine/zinc oxide Lotion 1 Application(s) Topical three times a day  dextrose 10% Bolus 125 milliLiter(s) IV Bolus once  dextrose 5%. 1000 milliLiter(s) IV Continuous <Continuous>  dextrose 5%. 1000 milliLiter(s) IV Continuous <Continuous>  dextrose 50% Injectable 25 Gram(s) IV Push once  dextrose 50% Injectable 12.5 Gram(s) IV Push once  epoetin mendoza-epbx (RETACRIT) Injectable 81233 Unit(s) SubCutaneous every 7 days  ferrous    sulfate 325 milliGRAM(s) Oral two times a day  glucagon  Injectable 1 milliGRAM(s) IntraMuscular once  heparin   Injectable 5000 Unit(s) SubCutaneous every 8 hours  insulin glargine Injectable (LANTUS) 10 Unit(s) SubCutaneous at bedtime  insulin lispro (ADMELOG) corrective regimen sliding scale   SubCutaneous three times a day before meals  insulin lispro (ADMELOG) corrective regimen sliding scale   SubCutaneous at bedtime  insulin lispro Injectable (ADMELOG) 5 Unit(s) SubCutaneous three times a day with meals  levothyroxine 75 MICROGram(s) Oral daily  midodrine 15 milliGRAM(s) Oral three times a day  Nephro-molly 1 Tablet(s) Oral daily  senna 2 Tablet(s) Oral at bedtime    PRN Inpatient Medications  dextrose Oral Gel 15 Gram(s) Oral once PRN  polyethylene glycol 3350 17 Gram(s) Oral two times a day PRN      REVIEW OF SYSTEMS  --------------------------------------------------------------------------------    Gen: denies  fevers/chills,  CVS: denies chest pain/palpitations  Resp: denies SOB/Cough  GI: Denies N/V/Abd pain  : Denies dysuria    VITALS/PHYSICAL EXAM  --------------------------------------------------------------------------------  T(C): 37 (05-13-24 @ 04:09), Max: 37 (05-13-24 @ 04:09)  HR: 81 (05-13-24 @ 11:45) (81 - 86)  BP: 110/65 (05-13-24 @ 11:45) (110/65 - 120/60)  RR: 18 (05-13-24 @ 04:09) (18 - 18)  SpO2: 100% (05-13-24 @ 11:45) (100% - 100%)  Wt(kg): --        05-12-24 @ 07:01  -  05-13-24 @ 07:00  --------------------------------------------------------  IN: 320 mL / OUT: 1300 mL / NET: -980 mL    05-13-24 @ 07:01  -  05-13-24 @ 19:43  --------------------------------------------------------  IN: 420 mL / OUT: 550 mL / NET: -130 mL      Physical Exam:  	        Gen: comfortable appearing   	Pulm: decrease bs  no rales or ronchi or wheezing  	CV: No JVD. RRR, S1S2; no rub II/VI M   	Abd: +BS, soft, nontender/nondistended, obese   	UE: Warm, no cyanosis  no clubbing, no edema  	LE: Warm, no cyanosis  no clubbing, 1+ edema,  	Neuro: alert and oriented       LABS/STUDIES  --------------------------------------------------------------------------------              9.4    7.02  >-----------<  194      [05-13-24 @ 06:53]              30.5     135  |  92  |  75  ----------------------------<  253      [05-13-24 @ 06:53]  3.9   |  25  |  3.47        Ca     9.0     [05-13-24 @ 06:53]      Mg     2.4     [05-13-24 @ 06:53]      Phos  5.1     [05-13-24 @ 06:53]            Creatinine Trend:  SCr 3.47 [05-13 @ 06:53]  SCr 3.48 [05-12 @ 06:56]  SCr 3.50 [05-11 @ 07:08]  SCr 3.66 [05-10 @ 07:13]  SCr 3.57 [05-09 @ 10:10]        PTH -- (Ca 8.4)      [04-19-24 @ 17:54]   109  TSH 5.06      [04-14-24 @ 07:18]  Lipid: chol 74, TG 70, HDL 33, LDL --      [04-13-24 @ 07:05]

## 2024-05-13 NOTE — PROGRESS NOTE ADULT - PROBLEM SELECTOR PLAN 12
Likely in setting of CKD vs myelosuppresion in setting of acute illnesses. No obvious source of bleed. Hgb Stable.     - daily CBC  - transfuse for goal Hgb >7  - EPO qweekly Likely in setting of CKD vs myelosuppresion in setting of acute illnesses. No obvious source of bleed. Hgb Stable.     - daily CBC  - transfuse for goal Hgb >7  - EPO qweekly.

## 2024-05-13 NOTE — PROGRESS NOTE ADULT - PROBLEM SELECTOR PLAN 9
Incidental finding on CT C/A/P w/ thickened L adrenal gland and 1.2 x 0.8 cm L adrenal nodule.   Two positive DST, concerning for cushing syndrome. Endo following.     - no indication for txt at this time  - f/u outpatient w/ Dr. Maynor Dumont for repeat testing Incidental finding on CT C/A/P w/ thickened L adrenal gland and 1.2 x 0.8 cm L adrenal nodule.  Two positive DST, concerning for cushing syndrome. Endo following.     - no indication for txt at this time  - f/u outpatient w/ Dr. Maynor Dumont for repeat testing.

## 2024-05-13 NOTE — PROGRESS NOTE ADULT - ASSESSMENT
This is a 74 y/o F with PMHx HFrEF (EF 30%), AS, LBBB, HTN, DM1, CKD, hypothyroidism, chronic lymphedema, LELIA (3L home O2) who presented for syncope 2/2 severe AS, now s/p TAVR 4/24 c/b by VT and Vfib requiring shock and flash pulmonary edema requiring intubation. Course complicated by contrast induced allergic reaction (had CTA for TAVR eval) and contrast related renal failure (acute on chronic) requiring CRRT for fluid removal and pressors for vasoplegia and hypovolemia due to CRRT. Patient now extubated, off pressors, CRRT and downgraded to medicine floors.  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 Course complicated by contrast induced allergic reaction (had CTA for TAVR eval) and contrast related renal failure (acute on chronic) requiring CRRT for fluid removal and pressors for vasoplegia and hypovolemia due to CRRT. Patient now extubated, off pressors, CRRT and downgraded to medicine floors.

## 2024-05-13 NOTE — PROGRESS NOTE ADULT - PROBLEM SELECTOR PLAN 3
EF from 4/2024 w/ EF 25%, global LV hypokinesis, moderately dilated LV, grade 2 LV diastolic dysfunction, RA moderately dilated. Home meds: torsemide 20mg qd, eplerenone 25mg qd, toprol XL 100MG qd    - cards following   - continue midodrine 5mg TID   - hold home meds, restart GDMT as tolerated  - f/u w/ EP outpatient for CRT EF from 4/2024 w/ EF 25%, global LV hypokinesis, moderately dilated LV, grade 2 LV diastolic dysfunction, RA moderately dilated. Home meds:     - cards following   - continue midodrine 10mg TID   - Continue atorvastatin 80mg daily  - hold home torsemide 20mg qd  - hold home eplerenone 25mg qd  - hold home metoprolol succinate 100mg qd  - restart GDMT as tolerated  - f/u w/ EP outpatient for CRT. EF from 4/2024 (post-TAVR) w/ EF 25%, global LV hypokinesis, moderately dilated LV, grade 2 LV diastolic dysfunction, RA moderately dilated.    - cards following   - continue midodrine 10mg TID   - Continue atorvastatin 80mg daily  - hold home torsemide 20mg qd  - hold home eplerenone 25mg qd  - hold home metoprolol succinate 100mg qd  - restart GDMT as tolerated  - f/u w/ EP outpatient for CRT.

## 2024-05-13 NOTE — PROGRESS NOTE ADULT - ASSESSMENT
73F w HFrEF (EF 30%), mod AS, known LBBB, HTN, IDDM1, CKD, hypothyroidism, chronic lymphedema, suspected OHS/LELIA on 3L NC home O2 p/w 1 episode of syncope. Recent admission at [facility] (3/29-4/5) for ADHF and DUNCAN s/p diuresis, discharged with new home O2 3L NC suspecting OHS/LELIA pending outpatient w/u. Since discharge a week ago, she has been staying at home with   Pt had sob walking to car. Once in car, she was still breathing heavily. Partner noticed pt was not responding to verbal stimuli for 10-15sec and witnessed R arm jerking. Pt gained consciousness quickly without postictal symptoms. Pt went to Cardiologist Dr. Nathan who recommended pt to come to ED. No fever, cp, abd pain, n/v. Leg swelling is improved from prior. Pt on torsemide 40mg which she has been taking. Pt was discharged on 3LNC which she is currently on. Patient reports she did not take Farxiga that she was discharged on as she was concerned about side effects.     In ED, patient was found afebrile, hemodynamically stable, breathing well on 3L NC. Initial labs notable for mild hyponatremia, DUNCAN on CKD, elevated proBNP and elevated pCO2 both improved from prior.  pt also noticed with abn creatinine. had severe AS had ct scan with IV contrast had contrast nephropathy and hypotension ---> CRRT required       1- CKD IV  with DUNCAN   2- CHF   3- lymphedema   4- severe AS  s/p tavr 4/24  5- hypotension     creatinine steady today  s/p ivf 5/9  anemia, trend hgb  retacrit 10,000 units weekly  has + uo is non oliguric  holding HD   hardy removed, TOV but required hardy again for urinary retention   enterococci UTI amoxicillin   midodrine 10 mg tid,  trend bp  strict I/O   trend creatinine and electrolytes daily

## 2024-05-13 NOTE — PROGRESS NOTE ADULT - PROBLEM SELECTOR PLAN 5
Likely delayed contrast reaction vs pantoprazole. Derm biopsy consistent w/ agep. s/p nystatin and triamcinolone cream to affected debi.   Improving.     - continue to monitor  - calamine cream, vaseline Likely delayed contrast reaction vs pantoprazole. Derm biopsy consistent w/ agep. s/p nystatin and triamcinolone cream to affected debi. Improving.     - continue to monitor  - calamine cream, vaseline

## 2024-05-13 NOTE — PROGRESS NOTE ADULT - SUBJECTIVE AND OBJECTIVE BOX
[facility]-- WOUND TEAM -- FOLLOW UP NOTE  --------------------------------------------------------------------------------    24 hour events/subjective:          Diet:  Diet, Consistent Carbohydrate w/Evening Snack (05-12-24 @ 13:07)      ROS: General/ SKIN/ Cardio/ MSK/ Vasc see HPI  all other systems negative    ALLERGIES & MEDICATIONS  --------------------------------------------------------------------------------  Allergies  contrast media (iodine-based) (Fever; Vomiting; Flushing; Hypotension; Rash; Stomach Upset)  Ativan (Rash; Urticaria)  penicillins (Hives (Mod to Severe); Anaphylaxis (Mod to Severe); Short breath (Mod to Severe); Angioedema (Mod to Severe))        STANDING INPATIENT MEDICATIONS  ammonium lactate 12% Lotion 1 Application(s) Topical <User Schedule>  aspirin  chewable 81 milliGRAM(s) Oral daily  atorvastatin 80 milliGRAM(s) Oral at bedtime  calamine/zinc oxide Lotion 1 Application(s) Topical three times a day  dextrose 10% Bolus 125 milliLiter(s) IV Bolus once  dextrose 5%. 1000 milliLiter(s) IV Continuous <Continuous>  dextrose 5%. 1000 milliLiter(s) IV Continuous <Continuous>  dextrose 50% Injectable 25 Gram(s) IV Push once  dextrose 50% Injectable 12.5 Gram(s) IV Push once  epoetin mendoza-epbx (RETACRIT) Injectable 26577 Unit(s) SubCutaneous every 7 days  ferrous    sulfate 325 milliGRAM(s) Oral two times a day  glucagon  Injectable 1 milliGRAM(s) IntraMuscular once  heparin   Injectable 5000 Unit(s) SubCutaneous every 8 hours  insulin glargine Injectable (LANTUS) 10 Unit(s) SubCutaneous at bedtime  insulin lispro (ADMELOG) corrective regimen sliding scale   SubCutaneous three times a day before meals  insulin lispro (ADMELOG) corrective regimen sliding scale   SubCutaneous at bedtime  insulin lispro Injectable (ADMELOG) 5 Unit(s) SubCutaneous three times a day with meals  levothyroxine 75 MICROGram(s) Oral daily  midodrine 10 milliGRAM(s) Oral three times a day  Nephro-molly 1 Tablet(s) Oral daily  senna 2 Tablet(s) Oral at bedtime      PRN INPATIENT MEDICATION  dextrose Oral Gel 15 Gram(s) Oral once PRN  polyethylene glycol 3350 17 Gram(s) Oral two times a day PRN        VITALS/PHYSICAL EXAM  --------------------------------------------------------------------------------  T(C): 37 (05-13-24 @ 04:09), Max: 37 (05-13-24 @ 04:09)  HR: 81 (05-13-24 @ 11:45) (81 - 86)  BP: 110/65 (05-13-24 @ 11:45) (110/65 - 120/60)  RR: 18 (05-13-24 @ 04:09) (18 - 18)  SpO2: 100% (05-13-24 @ 11:45) (100% - 100%)  Wt(kg): --              LABS/ CULTURES/ RADIOLOGY:              9.4    7.02  >-----------<  194      [05-13-24 @ 06:53]              30.5     135  |  92  |  75  ----------------------------<  253      [05-13-24 @ 06:53]  3.9   |  25  |  3.47        Ca     9.0     [05-13-24 @ 06:53]      Mg     2.4     [05-13-24 @ 06:53]      Phos  5.1     [05-13-24 @ 06:53]      CAPILLARY BLOOD GLUCOSE  POCT Blood Glucose.: 287 mg/dL (13 May 2024 12:49)  POCT Blood Glucose.: 284 mg/dL (13 May 2024 08:41)  POCT Blood Glucose.: 275 mg/dL (12 May 2024 21:41)  POCT Blood Glucose.: 283 mg/dL (12 May 2024 18:24)  POCT Blood Glucose.: 263 mg/dL (12 May 2024 17:11)      A1C with Estimated Average Glucose Result: 7.4 % (03-30-24 @ 08:21)  A1C with Estimated Average Glucose Result: 6.8 % (01-10-24 @ 08:37)   [facility]-- WOUND TEAM -- FOLLOW UP NOTE  --------------------------------------------------------------------------------    24 hour events/subjective:      Afebrile  Tolerating po w/o n/v  tolerated multilayer wrap-now less swelling, using ACE     legs w/ less swelling & less heavy     changed to ACE when pt on pressors and CVVHD  pt off pressors and CVVHD and now out of icu  pt w/ orthostasis and now on tele floor  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  pt had reaction to IV Contrast      Diet:  Diet, Consistent Carbohydrate w/Evening Snack (05-12-24 @ 13:07)      ROS: General/ SKIN/ Cardio/ MSK/ Vasc see HPI  all other systems negative    ALLERGIES & MEDICATIONS  --------------------------------------------------------------------------------  Allergies  contrast media (iodine-based) (Fever; Vomiting; Flushing; Hypotension; Rash; Stomach Upset)  Ativan (Rash; Urticaria)  penicillins (Hives (Mod to Severe); Anaphylaxis (Mod to Severe); Short breath (Mod to Severe); Angioedema (Mod to Severe))        STANDING INPATIENT MEDICATIONS  ammonium lactate 12% Lotion 1 Application(s) Topical <User Schedule>  aspirin  chewable 81 milliGRAM(s) Oral daily  atorvastatin 80 milliGRAM(s) Oral at bedtime  calamine/zinc oxide Lotion 1 Application(s) Topical three times a day  dextrose 10% Bolus 125 milliLiter(s) IV Bolus once  dextrose 5%. 1000 milliLiter(s) IV Continuous <Continuous>  dextrose 5%. 1000 milliLiter(s) IV Continuous <Continuous>  dextrose 50% Injectable 25 Gram(s) IV Push once  dextrose 50% Injectable 12.5 Gram(s) IV Push once  epoetin mendoza-epbx (RETACRIT) Injectable 03118 Unit(s) SubCutaneous every 7 days  ferrous    sulfate 325 milliGRAM(s) Oral two times a day  glucagon  Injectable 1 milliGRAM(s) IntraMuscular once  heparin   Injectable 5000 Unit(s) SubCutaneous every 8 hours  insulin glargine Injectable (LANTUS) 10 Unit(s) SubCutaneous at bedtime  insulin lispro (ADMELOG) corrective regimen sliding scale   SubCutaneous three times a day before meals  insulin lispro (ADMELOG) corrective regimen sliding scale   SubCutaneous at bedtime  insulin lispro Injectable (ADMELOG) 5 Unit(s) SubCutaneous three times a day with meals  levothyroxine 75 MICROGram(s) Oral daily  midodrine 10 milliGRAM(s) Oral three times a day  Nephro-molly 1 Tablet(s) Oral daily  senna 2 Tablet(s) Oral at bedtime      PRN INPATIENT MEDICATION  dextrose Oral Gel 15 Gram(s) Oral once PRN  polyethylene glycol 3350 17 Gram(s) Oral two times a day PRN        VITALS/PHYSICAL EXAM  --------------------------------------------------------------------------------  T(C): 37 (05-13-24 @ 04:09), Max: 37 (05-13-24 @ 04:09)  HR: 81 (05-13-24 @ 11:45) (81 - 86)  BP: 110/65 (05-13-24 @ 11:45) (110/65 - 120/60)  RR: 18 (05-13-24 @ 04:09) (18 - 18)  SpO2: 100% (05-13-24 @ 11:45) (100% - 100%)  Wt(kg): --      Guarded but stable,  A&Ox3, MO  WD/ WN/ WG,    VersaCare P500  HEENT:  NC/AT, EOMI, sclera clear, mucosa moist, throat clear, trachea midline, neck supple  Respiratory: nonlabored w/ equal chest rise  Gastrointestinal: soft NT/ND   Neurology: strength & sensation grossly intact,   Psych: calm/ appropriate  Musculoskeletal: FROM, no deformities/ contractures  Vascular: BLE equally warm,  no cyanosis, clubbing, nor acute ischemia       improved BLE edema equal       BLE DP pulses palpable       BLE hemosiderin staining & lymphedema improving          dried skin, soft plaques easily lifted w/ mechanical debridement       w/ several denuded areas       w/o blistering or scant drainage      Rt plantar foot mechanically debrided of lifting dried dark blister lifted w/ small callous and denuded area       No drainage, odor, erythema, increased warmth, tenderness, induration, fluctuance, nor crepitus  Skin: dry peeling flakey pale,  scattered psoriatic plaques throughout extremities and torso     gluteal cleft w/ linear area of denuded skin w/ hyperpigmented skin in surrounding buttocks skin  Skin folds of lateral torso w/ faint erythema in creases      w/o blistering  or  drainage  No odor, erythema, increased warmth, tenderness, induration, fluctuance, nor crepitus            LABS/ CULTURES/ RADIOLOGY:              9.4    7.02  >-----------<  194      [05-13-24 @ 06:53]              30.5     135  |  92  |  75  ----------------------------<  253      [05-13-24 @ 06:53]  3.9   |  25  |  3.47        Ca     9.0     [05-13-24 @ 06:53]      Mg     2.4     [05-13-24 @ 06:53]      Phos  5.1     [05-13-24 @ 06:53]      CAPILLARY BLOOD GLUCOSE  POCT Blood Glucose.: 287 mg/dL (13 May 2024 12:49)  POCT Blood Glucose.: 284 mg/dL (13 May 2024 08:41)  POCT Blood Glucose.: 275 mg/dL (12 May 2024 21:41)  POCT Blood Glucose.: 283 mg/dL (12 May 2024 18:24)  POCT Blood Glucose.: 263 mg/dL (12 May 2024 17:11)      A1C with Estimated Average Glucose Result: 7.4 % (03-30-24 @ 08:21)  A1C with Estimated Average Glucose Result: 6.8 % (01-10-24 @ 08:37)

## 2024-05-13 NOTE — PROGRESS NOTE ADULT - NUTRITIONAL ASSESSMENT
This patient has been assessed with a concern for Malnutrition and has been determined to have a diagnosis/diagnoses of Morbid obesity (BMI > 40).    This patient is being managed with:   Diet Consistent Carbohydrate w/Evening Snack-  Entered: May 12 2024  1:07PM

## 2024-05-13 NOTE — PROGRESS NOTE ADULT - ASSESSMENT
73F w/h/o of T1DM with unknown control due to CKD and anemia of chronic disease. DM c/b CKD. Also h/o HFrEF (EF 30%), mod AS, known LBBB, HTN,  hypothyroidism, chronic lymphedema, suspected OHS/LELIA on 3L NC home O2 p/w 1 episode of syncope with R arm jerking, likely 2/2 severe symptomatic AS. S/p AVR 4/24 c/b DUNCAN on CKD, fever and hypotension. Also  UTI/pul edema/ sepsis and L arenal nodule finding. Endocrine consult done for DM and adrenal nodule  management. Tolerating POs with prandial BG levels as PO intake improves. Will increase meal time insulin to BG goal 100 to 180s.   Per endocrine attending Dr Burton> Adrenal nodule work up completed and pt will need to f/u once she is more stable as out pt.       Home regimen: Lantus 33 units qhs, Novolog based on sliding scale TIDAC normally 3-5 units  Has Dexcom G7

## 2024-05-13 NOTE — PROGRESS NOTE ADULT - PROBLEM SELECTOR PLAN 8
TSH mildly elevated 4.79 - 5.06, FT4 1.3-1.5    - c/w home levothyroxine 75mcg qd   - Recheck TSH and FT4 outpatient outpatient TSH mildly elevated 4.79 - 5.06, FT4 1.3-1.5    - continue home levothyroxine 75mcg qd   - Recheck TSH and FT4 outpatient outpatient.

## 2024-05-13 NOTE — PROGRESS NOTE ADULT - ASSESSMENT
73F w HFrEF (EF 30%), mod AS, known LBBB, HTN, IDDM1, CKD, hypothyroidism, chronic lymphedema, p/w 1 episode of syncope with R arm jerking, likely 2/2 severe symptomatic AS. Pending CTA imagings for TAVR eval, c/b DUNCAN on CKD, on bumex gtt.      Wound Consult requested to assist w/ management of Psoriasis  Drug reaction  BLE Lymphedema w/ wounds  Incontinence Dermatitis Of buttocks     BLE LAc Hydrin to intact skin + Adaptic dressing QD     will continue to monitor as pt on pressor & CVVHD  Podiatry following foot wounds  Appreciate Derm Consult for Psoriasis & Drug reaction  BLE elevation & Compression  Moisturize intact skin w/ SWEEN cream BID  Nutrition Consult for optimization        encourage high quality protein, cynthia/ prosource, MVI & Vit C to promote wound healing  Hyperglycemia -  ADA diet and NPH & FS w/ ISS  Continue turning and positioning w/ offloading assistive devices as per protocol  Buttocks/ Sacrum Patito BID and prn soiling        Continue w/ attends under pads and Pericare as per protocol  Waffle Cushion to chair when oob to chair  Continue w/ low air loss pressure redistribution bed surface   Care as per CICU/medicine, will follow w/ you  Upon discharge f/u as outpatient at Wound Center [address] [facility] [phone]  S/w Wound PT and D/w team & RN & attng  Staci Lau PA-C CWS [id number]  Nights/ Weekends/ Holidays please call:  General Surgery Consult pager [phone]) for emergencies  Wound PT for multilayer leg wrapping or VAC issues ([phone])   I spent 35minutes face to face w/ this pt of which more than 50% of the time was spent counseling & coordinating care of this pt.

## 2024-05-13 NOTE — PROGRESS NOTE ADULT - PROBLEM SELECTOR PLAN 2
Presented for syncope secondary to severe AS, s/p TAVR on 4/24 c/b by VT -> shock -> VFib -> shock.  Course complicated w/ symptomatic bradycardia, requiring TVP, now s/p micra PPM.     - continue aspirin Presented for syncope secondary to severe AS, s/p TAVR on 4/24 c/b by VT -> shock -> VFib -> shock.  Course complicated w/ symptomatic bradycardia, requiring TVP, now s/p micra PPM.     - continue aspirin 81mg daily

## 2024-05-13 NOTE — PROGRESS NOTE ADULT - PROBLEM SELECTOR PLAN 10
Chronic lymphedema w/ wounds.     - ctm  - elevate legs as tolerated, encourage ambulation  - compression stockings, ace wrapping  - f/u wound care recs Chronic lymphedema w/ wounds.     - elevate legs as tolerated, encourage ambulation  - compression stockings, ace wrapping  - f/u wound care recs.

## 2024-05-14 NOTE — PROGRESS NOTE ADULT - ASSESSMENT
73F w/h/o of T1DM with unknown control due to CKD and anemia of chronic disease. DM c/b CKD. Also h/o HFrEF (EF 30%), mod AS, known LBBB, HTN,  hypothyroidism, chronic lymphedema, suspected OHS/LELIA on 3L NC home O2 p/w 1 episode of syncope with R arm jerking, likely 2/2 severe symptomatic AS. S/p AVR 4/24 c/b DUNCAN on CKD, fever and hypotension. Also UTI/pul edema/ sepsis and L adrenal nodule finding. Endocrine consult done for DM and adrenal nodule management. Tolerating POs with mild DKA this am without any clear reason/cause since pt had fasting hypoglycemia 2 days ago requiring basal insulin adjustment downward. Also pt denies eating overnight. Pt denied any N/V/abdominal pain, feels tired. Gave Lantus/Admelog/IVF this am with BMP improved this pm. Saw pt this pm again> reports feeling better and somewhat hungry. Will adjust insulin doses further to keep BG goal 100 to 180s. No hypoglycemia.     Per endocrine attending Dr Burton> Adrenal nodule work up completed and pt will need to f/u once she is more stable as out pt.       Home regimen: Lantus 33 units qhs, Novolog based on sliding scale TIDAC normally 3-5 units  Has Dexcom G7

## 2024-05-14 NOTE — PROGRESS NOTE ADULT - ASSESSMENT
73F w HFrEF (EF 30%), mod AS, known LBBB, HTN, IDDM1, CKD, hypothyroidism, chronic lymphedema, suspected OHS/LELIA on 3L NC home O2 p/w 1 episode of syncope. Recent admission at \[facility]\"" (3/29-4/5) for ADHF and DUNCAN s/p diuresis, discharged with new home O2 3L NC suspecting OHS/LELIA pending outpatient w/u. Since discharge a week ago, she has been staying at home with   Pt had sob walking to car. Once in car, she was still breathing heavily. Partner noticed pt was not responding to verbal stimuli for 10-15sec and witnessed R arm jerking. Pt gained consciousness quickly without postictal symptoms. Pt went to Cardiologist Dr. Nathan who recommended pt to come to ED. No fever, cp, abd pain, n/v. Leg swelling is improved from prior. Pt on torsemide 40mg which she has been taking. Pt was discharged on 3LNC which she is currently on. Patient reports she did not take Farxiga that she was discharged on as she was concerned about side effects.     In ED, patient was found afebrile, hemodynamically stable, breathing well on 3L NC. Initial labs notable for mild hyponatremia, DUNCAN on CKD, elevated proBNP and elevated pCO2 both improved from prior.  pt also noticed with abn creatinine. had severe AS had ct scan with IV contrast had contrast nephropathy and hypotension ---> CRRT required       1- CKD IV  with DUNCAN   2- CHF   3- lymphedema   4- severe AS  s/p tavr 4/24  5- hypotension       creatinine is slowly downtrending  anemia, trend hgb  retacrit 10,000 units weekly  non-oliguric, off Hd   hardy re-inserted for urinary retention  UTI completed ceftriaxone  orthostatic hypotension, continue midodrine 15 mg tid,  trend bp  suspected DKA given hyperglycemia and high anion gap  beta hydroxy butyrate pending  agree with gentle IVF hydration today  endo following  strict I/O   trend creatinine and electrolytes daily

## 2024-05-14 NOTE — PROGRESS NOTE ADULT - PROBLEM SELECTOR PLAN 10
Chronic lymphedema w/ wounds.     - elevate legs as tolerated, encourage ambulation  - compression stockings, ace wrapping  - f/u wound care recs.

## 2024-05-14 NOTE — PROGRESS NOTE ADULT - PROBLEM SELECTOR PLAN 3
EF from 4/2024 (post-TAVR) w/ EF 25%, global LV hypokinesis, moderately dilated LV, grade 2 LV diastolic dysfunction, RA moderately dilated.    - cards following   - continue midodrine 10mg TID   - Continue atorvastatin 80mg daily  - hold home torsemide 20mg qd  - hold home eplerenone 25mg qd  - hold home metoprolol succinate 100mg qd  - restart GDMT as tolerated  - f/u w/ EP outpatient for CRT. DUNCAN secondary to ATN from hypotension and contrast nephropathy. CKD stage 4. s/p CRRT and bumex drip in CICU.  Cr 3.08-> 3.57 -> 3.66 -> 3.48.  Improving, hardy for urinary retention (placed 5/10)    - f/u nephro recs   - strict I's and O's  - renally dose meds, avoid nephrotoxic agents.

## 2024-05-14 NOTE — PROGRESS NOTE ADULT - ASSESSMENT
73F w HFrEF (EF 30%), mod AS, known LBBB, HTN, IDDM1, CKD, hypothyroidism, chronic lymphedema, p/w 1 episode of syncope with R arm jerking.     #Syncope-Aortic Stenosis  - Known Aortic Stenosis likely Severe in setting of decreased LVEF  - s/p tavr on 4/24 c/b VT -> shock -> VFib -> shock  - hypoxic/congested, and was intubated, now extubated on 4/25 in the evening, on NC   - on 4/25 with worsening bradycardia, and now s/p TVP placement followed by AV Micra placement with removal of TVP  - Remains in Sinus Rhythm/known lbbb with intermittent   - Vaso has been weaned off with the uptitration of midodrine  - On Midodrine 15mg Q8  - Would wean Midodrine to 10mg TID given systolic BPs int he 110s-120s  - to consider CRT-D in the future  - Cannot initiate GDMT due to blood pressures  - would try to mobilize as best as possible and monitor orthostatics  - cont asa and statin    #Chronic Systolic HF (EF 30%), mod to severe AS, HTN, LBBB, Lymphedema   - Has known systolic HF and AS.    - Repeat TTE showed EF 30%, mild-mod LVH, mildly reduced RVF, mod-severe AS (.61 cmsq), mod pHTN  - On home Torsemide 20 mg daily, Eplerenone 25 mg daily, and Toprol  mg daily, all currently on hold  - initially CVVHD, then HD  - HD now on hold, as she is urinating and creatinine steady.  - prn iv bumex  - renal fu    - overall improving. needs PT  - will follow with you

## 2024-05-14 NOTE — PROGRESS NOTE ADULT - SUBJECTIVE AND OBJECTIVE BOX
[facility] - Howard Kimble, Carlos Luong, Herman Alston  Office Number:  [phone]    Patient resting comfortably in bed in NAD.  Laying flat with no respiratory distress.  No complaints of chest pain, dyspnea, palpitations, PND, or orthopnea.  Hyperglycemic this morning  Remains off HD    ROS: negative unless otherwise mentioned.    Telemetry: off    MEDICATIONS  (STANDING):  ammonium lactate 12% Lotion 1 Application(s) Topical <User Schedule>  aspirin  chewable 81 milliGRAM(s) Oral daily  atorvastatin 80 milliGRAM(s) Oral at bedtime  calamine/zinc oxide Lotion 1 Application(s) Topical three times a day  dextrose 10% Bolus 125 milliLiter(s) IV Bolus once  dextrose 5%. 1000 milliLiter(s) (50 mL/Hr) IV Continuous <Continuous>  dextrose 5%. 1000 milliLiter(s) (100 mL/Hr) IV Continuous <Continuous>  dextrose 50% Injectable 25 Gram(s) IV Push once  dextrose 50% Injectable 12.5 Gram(s) IV Push once  epoetin mendoza-epbx (RETACRIT) Injectable 68893 Unit(s) SubCutaneous every 7 days  ferrous    sulfate 325 milliGRAM(s) Oral two times a day  glucagon  Injectable 1 milliGRAM(s) IntraMuscular once  heparin   Injectable 5000 Unit(s) SubCutaneous every 8 hours  insulin glargine Injectable (LANTUS) 10 Unit(s) SubCutaneous at bedtime  insulin lispro (ADMELOG) corrective regimen sliding scale   SubCutaneous at bedtime  insulin lispro (ADMELOG) corrective regimen sliding scale   SubCutaneous three times a day before meals  insulin lispro Injectable (ADMELOG) 5 Unit(s) SubCutaneous three times a day with meals  levothyroxine 75 MICROGram(s) Oral daily  midodrine 15 milliGRAM(s) Oral three times a day  Nephro-molly 1 Tablet(s) Oral daily  senna 2 Tablet(s) Oral at bedtime    MEDICATIONS  (PRN):  dextrose Oral Gel 15 Gram(s) Oral once PRN Blood Glucose LESS THAN 70 milliGRAM(s)/deciliter  polyethylene glycol 3350 17 Gram(s) Oral two times a day PRN Constipation      Allergies    contrast media (iodine-based) (Fever; Vomiting; Flushing; Hypotension; Rash; Stomach Upset)  Ativan (Rash; Urticaria)  penicillins (Hives (Mod to Severe); Anaphylaxis (Mod to Severe); Short breath (Mod to Severe); Angioedema (Mod to Severe))    Intolerances        Vital Signs Last 24 Hrs  T(C): 36.6 (14 May 2024 05:05), Max: 36.6 (13 May 2024 20:36)  T(F): 97.9 (14 May 2024 05:05), Max: 97.9 (14 May 2024 05:05)  HR: 68 (14 May 2024 05:05) (68 - 81)  BP: 128/67 (14 May 2024 05:05) (110/65 - 128/67)  BP(mean): --  RR: 18 (14 May 2024 05:05) (18 - 20)  SpO2: 99% (14 May 2024 05:05) (95% - 100%)    Parameters below as of 14 May 2024 05:05  Patient On (Oxygen Delivery Method): room air        I&O's Summary    13 May 2024 07:01  -  14 May 2024 07:00  --------------------------------------------------------  IN: 780 mL / OUT: 900 mL / NET: -120 mL        ON EXAM:    General: NAD, awake and alert, oriented x 3  HEENT: Mucous membranes are moist, anicteric  Lungs: Non-labored, breath sounds are clear bilaterally, No wheezing, rales or rhonchi  Cardiovascular: Regular, S1 and S2, harsh systolic murmur  Gastrointestinal: Bowel Sounds present, soft, nontender.   Lymph: chronic lymphedema. Wrapped   Skin: No rashes or ulcers  Psych:  Mood & affect appropriate    LABS: All Labs Reviewed:                        8.7    6.97  )-----------( 189      ( 14 May 2024 07:12 )             28.8                         9.4    7.02  )-----------( 194      ( 13 May 2024 06:53 )             30.5                         9.6    8.66  )-----------( 216      ( 12 May 2024 06:57 )             31.5     14 May 2024 07:14    131    |  90     |  80     ----------------------------<  462    4.5     |  20     |  3.37   13 May 2024 06:53    135    |  92     |  75     ----------------------------<  253    3.9     |  25     |  3.47   12 May 2024 06:56    134    |  91     |  69     ----------------------------<  41     3.6     |  26     |  3.48     Ca    8.9        14 May 2024 07:14  Ca    9.0        13 May 2024 06:53  Ca    9.0        12 May 2024 06:56  Phos  4.9       14 May 2024 07:14  Phos  5.1       13 May 2024 06:53  Phos  5.1       12 May 2024 06:56  Mg     2.3       14 May 2024 07:14  Mg     2.4       13 May 2024 06:53  Mg     2.4       12 May 2024 06:56    TPro  6.2    /  Alb  3.0    /  TBili  0.6    /  DBili  x      /  AST  16     /  ALT  10     /  AlkPhos  84     14 May 2024 07:14          Blood Culture: Organism Enterococcus faecalis (vancomycin resistant)  Gram Stain Blood -- Gram Stain --  Specimen Source Clean Catch Clean Catch (Midstream)  Culture-Blood --

## 2024-05-14 NOTE — PROGRESS NOTE ADULT - PROBLEM SELECTOR PLAN 5
Likely delayed contrast reaction vs pantoprazole. Derm biopsy consistent w/ agep. s/p nystatin and triamcinolone cream to affected debi. Improving.     - continue to monitor  - calamine cream, vaseline EF from 4/2024 (post-TAVR) w/ EF 25%, global LV hypokinesis, moderately dilated LV, grade 2 LV diastolic dysfunction, RA moderately dilated.    - cards following   - continue midodrine 10mg TID   - Continue atorvastatin 80mg daily  - hold home torsemide 20mg qd  - hold home eplerenone 25mg qd  - hold home metoprolol succinate 100mg qd  - restart GDMT as tolerated  - f/u w/ EP outpatient for CRT.

## 2024-05-14 NOTE — PROGRESS NOTE ADULT - PROBLEM SELECTOR PROBLEM 5
Acute generalized exanthematous pustulosis due to drug HFrEF (heart failure with reduced ejection fraction)

## 2024-05-14 NOTE — PROGRESS NOTE ADULT - PROBLEM SELECTOR PLAN 1
DUNCAN secondary to ATN from hypotension and contrast nephropathy. CKD stage 4. s/p CRRT and bumex drip in CICU.  Cr 3.08-> 3.57 -> 3.66 -> 3.48.  Improving, hardy for urinary retention (placed 5/10)    - f/u nephro recs   - strict I's and O's  - renally dose meds, avoid nephrotoxic agents. Home regimen: lantus 33u qhs w/ premeal sliding scale. A1c 7.4% in 3/2024. multiple hypoglycemic episodes, but now hyperglycemic with anion gap concerning for developing DKA.      - endo following, appreciate recs   - For DKA, will make NPO  - Lantus 10U STAT this AM  - Lantus 10U qHS  - MDSSI and LDSSI at night  - Gentle fluids  - q4 finger sticks  - F/u Beta hydroxybutyrate  - hypoglycemia protocol.

## 2024-05-14 NOTE — PROGRESS NOTE ADULT - PROBLEM SELECTOR PLAN 6
Home regimen: lantus 33u qhs w/ premeal sliding scale. A1c 7.4% in 3/2024. multiple hypoglycemic episodes     - endo following, appreciate recs   - Lantus 10 units at bedtime, admelog 2 units pre-meal  - low dose premeal and bedtime sliding scale  - hold premeal if NPO   - hypoglycemia protocol. U/A w/ large LE, 501 WBC, 5 RBC and mod bacteria. Urine cx growing 10-49k Vancomycin resistant enterococcus faecalis. Will hold on amoxicillin given asymptomatic nature of UTI    - s/p ceftriaxone (5/8-5/10)

## 2024-05-14 NOTE — PROGRESS NOTE ADULT - ATTENDING COMMENTS
73F PMH: HFrEF (EF 30%), AS, LBBB, HTN, DM1, CKD, hypothyroidism, chronic lymphedema, LELIA (3L home O2) p/w for syncope 2/2 severe AS, s/p TAVR 4/24. Course complicated by contrast induced allergic reaction (had CTA for TAVR eval) and contrast related renal failure (acute on chronic) requiring HD. TAVR c/b VT and vfib requiring shock and flash pulmonary edema causing AHRF requiring intubation. Course as documented.     Hyperglycemia and DKA this am, given stat Lantus, gap now closed. Monitor.

## 2024-05-14 NOTE — PROGRESS NOTE ADULT - PROBLEM SELECTOR PLAN 7
Likely in setting of deconditioning. Still present    - leg compression, abdominal binder   - PT rec HONEY   - OOB as tolerated   - midodrine 10mg TID.  - May be volume down Likely delayed contrast reaction vs pantoprazole. Derm biopsy consistent w/ agep. s/p nystatin and triamcinolone cream to affected debi. Improving.     - continue to monitor  - calamine cream, vaseline

## 2024-05-14 NOTE — PROGRESS NOTE ADULT - PROBLEM SELECTOR PLAN 9
Incidental finding on CT C/A/P w/ thickened L adrenal gland and 1.2 x 0.8 cm L adrenal nodule.  Two positive DST, concerning for cushing syndrome. Endo following.     - no indication for txt at this time  - f/u outpatient w/ Dr. Maynor Dumont for repeat testing. Incidental finding on CT C/A/P w/ thickened L adrenal gland and 1.2 x 0.8 cm L adrenal nodule.  Two positive DST, concerning for cushing syndrome. Endo following.    - no indication for txt at this time  - f/u outpatient w/ Dr. Maynor Dumont for repeat testing.

## 2024-05-14 NOTE — PROGRESS NOTE ADULT - SUBJECTIVE AND OBJECTIVE BOX
***************************************************************  Jairo Kaufman, PG1  Internal Medicine   Pager:  [phone]  ***************************************************************    EVELYN LOPEZ  73y  MRN: [id number]    Patient is a 73y old  Female who presents with a chief complaint of Syncope (14 May 2024 10:22)      Interval/Overnight Events: no events ON.     Subjective: Pt seen and examined at bedside. Denies fever, CP, SOB, abn pain, N/V, dysuria. Tolerating diet.      MEDICATIONS  (STANDING):  ammonium lactate 12% Lotion 1 Application(s) Topical <User Schedule>  aspirin  chewable 81 milliGRAM(s) Oral daily  atorvastatin 80 milliGRAM(s) Oral at bedtime  calamine/zinc oxide Lotion 1 Application(s) Topical three times a day  dextrose 10% Bolus 125 milliLiter(s) IV Bolus once  dextrose 5%. 1000 milliLiter(s) (100 mL/Hr) IV Continuous <Continuous>  dextrose 5%. 1000 milliLiter(s) (50 mL/Hr) IV Continuous <Continuous>  dextrose 50% Injectable 25 Gram(s) IV Push once  dextrose 50% Injectable 12.5 Gram(s) IV Push once  epoetin mendoza-epbx (RETACRIT) Injectable 69766 Unit(s) SubCutaneous every 7 days  ferrous    sulfate 325 milliGRAM(s) Oral two times a day  glucagon  Injectable 1 milliGRAM(s) IntraMuscular once  heparin   Injectable 5000 Unit(s) SubCutaneous every 8 hours  insulin glargine Injectable (LANTUS) 10 Unit(s) SubCutaneous once  insulin glargine Injectable (LANTUS) 10 Unit(s) SubCutaneous at bedtime  insulin lispro (ADMELOG) corrective regimen sliding scale   SubCutaneous three times a day before meals  insulin lispro (ADMELOG) corrective regimen sliding scale   SubCutaneous at bedtime  levothyroxine 75 MICROGram(s) Oral daily  midodrine 15 milliGRAM(s) Oral three times a day  Nephro-molly 1 Tablet(s) Oral daily  senna 2 Tablet(s) Oral at bedtime  sodium chloride 0.9%. 1000 milliLiter(s) (75 mL/Hr) IV Continuous <Continuous>    MEDICATIONS  (PRN):  dextrose Oral Gel 15 Gram(s) Oral once PRN Blood Glucose LESS THAN 70 milliGRAM(s)/deciliter  polyethylene glycol 3350 17 Gram(s) Oral two times a day PRN Constipation      Objective:    Vitals: Vital Signs Last 24 Hrs  T(C): 36.6 (05-14-24 @ 05:05), Max: 36.6 (05-13-24 @ 20:36)  T(F): 97.9 (05-14-24 @ 05:05), Max: 97.9 (05-14-24 @ 05:05)  HR: 68 (05-14-24 @ 05:05) (68 - 81)  BP: 128/67 (05-14-24 @ 05:05) (110/65 - 128/67)  BP(mean): --  RR: 18 (05-14-24 @ 05:05) (18 - 20)  SpO2: 99% (05-14-24 @ 05:05) (95% - 100%)                I&O's Summary    13 May 2024 07:01  -  14 May 2024 07:00  --------------------------------------------------------  IN: 780 mL / OUT: 900 mL / NET: -120 mL        PHYSICAL EXAM:  GENERAL: NAD  HEAD:  Atraumatic, Normocephalic  EYES: EOMI, PERRL, conjunctiva and sclera clear  ENMT: Moist mucous membranes  NECK: Supple, No JVD  CHEST/LUNG: Clear to auscultation bilaterally; No rales, rhonchi, wheezing, or rubs  HEART: Regular rate and rhythm, 4/6 systolic murmur  ABDOMEN: Soft, Nontender, Nondistended; Bowel sounds present  EXTREMITIES:  2+ Peripheral Pulses, 3+ b/l edema up to thighs, No clubbing, cyanosis  SKIN: generalized erythema w/ skin sloughing and desquamation, nonpruritic.  Bullae on shins  NERVOUS SYSTEM:  Alert & Oriented X3, Good concentration  PSYCH:  Appropriate affect    LABS:                        8.7    6.97  )-----------( 189      ( 14 May 2024 07:12 )             28.8                         9.4    7.02  )-----------( 194      ( 13 May 2024 06:53 )             30.5                         9.6    8.66  )-----------( 216      ( 12 May 2024 06:57 )             31.5     05-14    131<L>  |  90<L>  |  80<H>  ----------------------------<  462<HH>  4.5   |  20<L>  |  3.37<H>  05-13    135  |  92<L>  |  75<H>  ----------------------------<  253<H>  3.9   |  25  |  3.47<H>  05-12    134<L>  |  91<L>  |  69<H>  ----------------------------<  41<LL>  3.6   |  26  |  3.48<H>    Ca    8.9      14 May 2024 07:14  Ca    9.0      13 May 2024 06:53  Ca    9.0      12 May 2024 06:56  Phos  4.9     05-14  Mg     2.3     05-14    TPro  6.2  /  Alb  3.0<L>  /  TBili  0.6  /  DBili  x   /  AST  16  /  ALT  10  /  AlkPhos  84  05-14    CAPILLARY BLOOD GLUCOSE      POCT Blood Glucose.: 469 mg/dL (14 May 2024 08:44)  POCT Blood Glucose.: 488 mg/dL (14 May 2024 08:43)  POCT Blood Glucose.: 221 mg/dL (13 May 2024 21:44)  POCT Blood Glucose.: 232 mg/dL (13 May 2024 17:23)  POCT Blood Glucose.: 287 mg/dL (13 May 2024 12:49)        Urinalysis Basic - ( 14 May 2024 07:14 )    Color: x / Appearance: x / SG: x / pH: x  Gluc: 462 mg/dL / Ketone: x  / Bili: x / Urobili: x   Blood: x / Protein: x / Nitrite: x   Leuk Esterase: x / RBC: x / WBC x   Sq Epi: x / Non Sq Epi: x / Bacteria: x          RADIOLOGY & ADDITIONAL TESTS:    Imaging Personally Reviewed:  [x ] YES  [ ] NO    Consultants involved in case:   Consultant(s) Notes Reviewed:  [ x] YES  [ ] NO:   Care Discussed with Consultants/Other Providers [x ] YES  [ ] NO

## 2024-05-14 NOTE — PROGRESS NOTE ADULT - PROBLEM SELECTOR PLAN 3
Incidentally found on CT c/a/p w/wo IV contrast done for TAVR evaluation. The left adrenal gland is thickened and a 1.2 x 0.8 cm left adrenal nodule is seen. The right adrenal gland is normal.   Primary team discussed with radiology. HU of the adrenal nodule not able to be accurately determined due to motion artifact, also given imaging was taken at venous phase.    Patient had another CT scan which showed normal adrenals on 5/3/24  Test for excess adrenal hormones:   - Dex suppression test: AM cortisol 9.4 not suppressed with sufficiently high dexamethasone 374 (4/18/24). Repeat test AM cortisol 4.1 not suppressed with ACTH 9.5, dex level 449 (4/20/24). Concerning for possible Cushings, likely primary adrenal source given ACTH not elevated, however, patient is also in ICU on pressors and in a stressed state. Urine cortisol low at 1.2mcg/24 hour but only 200ml for 24 hours. Salivary cortisol cancelled for QNS, repeat salivary cortisol specimen received   - plasma metanephrines 46.8 normal range    - serum aldosterone is elevated to 37.4. Plasma renin activity 9.775. Ratio = 3.82, not elevated, no hyperaldosteronism.    - DHEAS 139 wnl. Androstenedione <10.    PLAN  - Patient with 2 positive DST - concerning for cushing syndrome. No indication for treatment at this time, would recommend repeating testing outpatient after patient resolved from acute critical illness - will give signout to patient's endocrinologist Dr. Luis Dumont prior to discharge   - Follow up salivary cortisol as out pt as per Dr Burton.    - May need to repeat 24 hour urine cortisol once renal function improved and no longer on CRRT   -Renal function improving but remains with creat in 3s. No need to test at this time. If team wishes can send salivary cortisol and 24 hour urine cortisol prior to discharge but per Dr Burton, test results won't be available  until 1-2 weeks.   - Will need follow up outpatient with Dr. Luis Dumont

## 2024-05-14 NOTE — PROGRESS NOTE ADULT - PROBLEM SELECTOR PLAN 12
Likely in setting of CKD vs myelosuppresion in setting of acute illnesses. No obvious source of bleed. Hgb Stable.     - daily CBC  - transfuse for goal Hgb >7  - EPO qweekly.

## 2024-05-14 NOTE — PROGRESS NOTE ADULT - PROBLEM SELECTOR PLAN 1
- Test BG ac and hs  - Adjust Lantus to 10 units ac breakfast and 10 units at hs for now. Pt angela Lantus 33 units at home and was requiring lower doses while in hospital due to poor PO intake. PO intake improved so will increase dose and re evaluate. Also creat improving.    - Changed Admelog dose to 8 units TIDAC for now> will adjust as needed  - C/w Low dose admelog correction scale TIDAC, Low dose admelog correction scale QHS and add 2am in case of reabound hyperglycemia  -F/u BMP tonight  to make sure pt remains stable and then q am  -Can restart consistent carb diet this pm  -Discontinue IVF once pt is eating again  -Will follow  Discharge:  Will be determined according to BG/insulin needs/PO intake  at time of discharge. Basal/bolus insulin doses TBD  - Of note, patient instructed on discharge from recent hospitalization at [facility] to start farxiga for CHF. Given risks of DKA of SGLT2i in T1DM, would not start until better data is available for its benefits.  - Follow up with Dr. Luis Dumont outpatient  -Pt will likely required rehab  F/u with optho/renal/cardiac as out pt - Test BG ac and hs/2am  - Adjust Lantus to 10 units ac breakfast and 10 units at hs for now. Pt angela Lantus 33 units at home and was requiring lower doses while in hospital due to poor PO intake. PO intake improved so will increase dose and re evaluate. Also creat improving.    - Changed Admelog dose to 8 units TIDAC for now> will adjust as needed  - C/w Low dose admelog correction scale TIDAC, Low dose admelog correction scale QHS and add 2am in case of reabound hyperglycemia  -F/u BMP tonight  to make sure pt remains stable and then q am  -Can restart consistent carb diet this pm  -Discontinue IVF once pt is eating again  -Will follow  Discharge:  Will be determined according to BG/insulin needs/PO intake  at time of discharge. Basal/bolus insulin doses TBD  - Of note, patient instructed on discharge from recent hospitalization at [facility] to start farxiga for CHF. Given risks of DKA of SGLT2i in T1DM, would not start until better data is available for its benefits.  - Follow up with Dr. Luis Dumont outpatient  -Pt will likely required rehab  F/u with optho/renal/cardiac as out pt

## 2024-05-14 NOTE — PROGRESS NOTE ADULT - NUTRITIONAL ASSESSMENT
This patient has been assessed with a concern for Malnutrition and has been determined to have a diagnosis/diagnoses of Morbid obesity (BMI > 40).    This patient is being managed with:   Diet NPO-  Except Medications  Entered: May 14 2024 10:20AM

## 2024-05-14 NOTE — PROGRESS NOTE ADULT - ASSESSMENT
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 Course complicated by contrast induced allergic reaction (had CTA for TAVR eval) and contrast related renal failure (acute on chronic) requiring CRRT for fluid removal and pressors for vasoplegia and hypovolemia due to CRRT. Patient now extubated, off pressors, CRRT and downgraded to medicine floors.

## 2024-05-14 NOTE — PROGRESS NOTE ADULT - SUBJECTIVE AND OBJECTIVE BOX
DIABETES FOLLOW UP NOTE: Saw pt earlier today    Chief Complaint: Endocrine consult requested for management of DM    INTERVAL HX:: Pt feeling weak today but tolerating POs. Denies any N/V/abdominal; pain. Noted pt BG this am >400s with +AG, +BHB. Pt reports not eating any food overnight > states she and rice last night for dinner. Spoke to team with recs. BG improved this pm after pt was place NPO and received fluids/insulin. Labs from this pm are improved AG close, BHB negative and BG improving slowly. No hypoglycemia and creat improved.         Review of Systems:  General: As above  Cardiovascular: No chest pain, palpitations  Respiratory: No SOB, no cough  GI: No nausea, vomiting, abdominal pain  Endocrine: No polyuria, polydipsia or S&Sx of hypoglycemia    Allergies    contrast media (iodine-based) (Fever; Vomiting; Flushing; Hypotension; Rash; Stomach Upset)  Ativan (Rash; Urticaria)  penicillins (Hives (Mod to Severe); Anaphylaxis (Mod to Severe); Short breath (Mod to Severe); Angioedema (Mod to Severe))    Intolerances      MEDICATIONS:  atorvastatin 80 milliGRAM(s) Oral at bedtime  insulin glargine Injectable (LANTUS) 10 Unit(s) SubCutaneous at bedtime  insulin lispro (ADMELOG) corrective regimen sliding scale   SubCutaneous three times a day before meals  insulin lispro (ADMELOG) corrective regimen sliding scale   SubCutaneous at bedtime  levothyroxine 75 MICROGram(s) Oral daily    PHYSICAL EXAM:  VITALS: T(C): 36.6 (05-14-24 @ 16:04)  T(F): 97.9 (05-14-24 @ 16:04), Max: 97.9 (05-14-24 @ 05:05)  HR: 72 (05-14-24 @ 16:04) (68 - 76)  BP: 129/67 (05-14-24 @ 16:04) (114/56 - 136/63)  RR:  (18 - 20)  SpO2:  (95% - 100%)  Wt(kg): --  GENERAL: Female laying in bed in NAD.   Abdomen: Soft, nontender, non distended.  Extremities: Warm,+ edema in LE with dressings on D&I.  NEURO: A&O X3      LABS:  POCT Blood Glucose.: 302 mg/dL (05-14-24 @ 14:08)  POCT Blood Glucose.: 368 mg/dL (05-14-24 @ 12:34)  POCT Blood Glucose.: 447 mg/dL (05-14-24 @ 10:38)  POCT Blood Glucose.: 463 mg/dL (05-14-24 @ 09:35)  POCT Blood Glucose.: 469 mg/dL (05-14-24 @ 08:44)  POCT Blood Glucose.: 488 mg/dL (05-14-24 @ 08:43)  POCT Blood Glucose.: 221 mg/dL (05-13-24 @ 21:44)  POCT Blood Glucose.: 232 mg/dL (05-13-24 @ 17:23)  POCT Blood Glucose.: 287 mg/dL (05-13-24 @ 12:49)  POCT Blood Glucose.: 284 mg/dL (05-13-24 @ 08:41)  POCT Blood Glucose.: 275 mg/dL (05-12-24 @ 21:41)  POCT Blood Glucose.: 283 mg/dL (05-12-24 @ 18:24)  POCT Blood Glucose.: 263 mg/dL (05-12-24 @ 17:11)  POCT Blood Glucose.: 267 mg/dL (05-12-24 @ 13:49)  POCT Blood Glucose.: 115 mg/dL (05-12-24 @ 09:31)  POCT Blood Glucose.: 54 mg/dL (05-12-24 @ 08:43)  POCT Blood Glucose.: 54 mg/dL (05-12-24 @ 08:41)  POCT Blood Glucose.: 56 mg/dL (05-12-24 @ 08:21)  POCT Blood Glucose.: 53 mg/dL (05-12-24 @ 08:18)  POCT Blood Glucose.: 168 mg/dL (05-11-24 @ 21:51)  POCT Blood Glucose.: 188 mg/dL (05-11-24 @ 17:24)                            8.7    6.97  )-----------( 189      ( 14 May 2024 07:12 )             28.8       05-14    133<L>  |  93<L>  |  76<H>  ----------------------------<  286<H>  4.3   |  27  |  3.10<H>    eGFR: 15<L>    Ca    9.0      05-14  Mg     2.3     05-14  Phos  4.9     05-14    TPro  6.2  /  Alb  3.0<L>  /  TBili  0.6  /  DBili  x   /  AST  16  /  ALT  10  /  AlkPhos  84  05-14      A1C with Estimated Average Glucose Result: 7.4 % (03-30-24 @ 08:21)      Estimated Average Glucose: 166 mg/dL (03-30-24 @ 08:21)        04-13 Chol 74 Direct LDL -- LDL calculated 25 HDL 33<L> Trig 70

## 2024-05-14 NOTE — PROGRESS NOTE ADULT - PROBLEM SELECTOR PLAN 2
Presented for syncope secondary to severe AS, s/p TAVR on 4/24 c/b by VT -> shock -> VFib -> shock.  Course complicated w/ symptomatic bradycardia, requiring TVP, now s/p micra PPM.     - continue aspirin 81mg daily Likely in setting of deconditioning. Still present    - leg compression, abdominal binder   - PT rec HONEY   - OOB as tolerated   - midodrine 10mg TID.  - May be volume down, getting fluids for DKA

## 2024-05-14 NOTE — PROGRESS NOTE ADULT - NSPROGADDITIONALINFOA_GEN_ALL_CORE
-Plan discussed with pt/team.  Contact info: [phone] (24/7). pager [phone]  Amion on [place]  Teams on M-T-W-F. Unavailable Thu/Weekends/Holidays  Assessed pt/labs/meds and discussed plan of care with primary team  Adjusting insulin  Discharge plan  Follow up care

## 2024-05-14 NOTE — PROGRESS NOTE ADULT - PROBLEM SELECTOR PLAN 11
on 3L NC at home. Not on CPAP. Hospital course complicated w/ AHRF 2/2 to flash pulm edema requiring intubation. Now extubated and stable on NC    - continue to monitor.

## 2024-05-14 NOTE — PROGRESS NOTE ADULT - PROBLEM SELECTOR PLAN 8
TSH mildly elevated 4.79 - 5.06, FT4 1.3-1.5    - continue home levothyroxine 75mcg qd   - Recheck TSH and FT4 outpatient outpatient.

## 2024-05-14 NOTE — PROGRESS NOTE ADULT - NUTRITIONAL ASSESSMENT
Diet, NPO:   Except Medications (05-14-24 @ 10:20) [Active]      Please see RD assessment and/or follow up.  Managed by primary team as well

## 2024-05-14 NOTE — PROGRESS NOTE ADULT - PROBLEM SELECTOR PLAN 4
U/A w/ large LE, 501 WBC, 5 RBC and mod bacteria. Urine cx growing 10-49k Vancomycin resistant enterococcus faecalis. Will hold on amoxicillin given asymptomatic nature of UTI    - s/p ceftriaxone (5/8-5/10) Presented for syncope secondary to severe AS, s/p TAVR on 4/24 c/b by VT -> shock -> VFib -> shock.  Course complicated w/ symptomatic bradycardia, requiring TVP, now s/p micra PPM.     - continue aspirin 81mg daily

## 2024-05-14 NOTE — PROGRESS NOTE ADULT - SUBJECTIVE AND OBJECTIVE BOX
[facility]   [phone]  RENAL FOLLOW UP NOTE  --------------------------------------------------------------------------------  Chief Complaint:    24 hour events/subjective:    patient seen and examined.   +orthostatic hypotension   tolerating po intake    PAST HISTORY  --------------------------------------------------------------------------------  No significant changes to PMH, PSH, FHx, SHx, unless otherwise noted    ALLERGIES & MEDICATIONS  --------------------------------------------------------------------------------  Allergies    contrast media (iodine-based) (Fever; Vomiting; Flushing; Hypotension; Rash; Stomach Upset)  Ativan (Rash; Urticaria)  penicillins (Hives (Mod to Severe); Anaphylaxis (Mod to Severe); Short breath (Mod to Severe); Angioedema (Mod to Severe))    Intolerances      Standing Inpatient Medications  ammonium lactate 12% Lotion 1 Application(s) Topical <User Schedule>  aspirin  chewable 81 milliGRAM(s) Oral daily  atorvastatin 80 milliGRAM(s) Oral at bedtime  calamine/zinc oxide Lotion 1 Application(s) Topical three times a day  dextrose 10% Bolus 125 milliLiter(s) IV Bolus once  dextrose 5%. 1000 milliLiter(s) IV Continuous <Continuous>  dextrose 5%. 1000 milliLiter(s) IV Continuous <Continuous>  dextrose 50% Injectable 25 Gram(s) IV Push once  dextrose 50% Injectable 12.5 Gram(s) IV Push once  epoetin mendoza-epbx (RETACRIT) Injectable 35688 Unit(s) SubCutaneous every 7 days  ferrous    sulfate 325 milliGRAM(s) Oral two times a day  glucagon  Injectable 1 milliGRAM(s) IntraMuscular once  heparin   Injectable 5000 Unit(s) SubCutaneous every 8 hours  insulin glargine Injectable (LANTUS) 10 Unit(s) SubCutaneous at bedtime  insulin lispro (ADMELOG) corrective regimen sliding scale   SubCutaneous three times a day before meals  insulin lispro (ADMELOG) corrective regimen sliding scale   SubCutaneous at bedtime  levothyroxine 75 MICROGram(s) Oral daily  midodrine 10 milliGRAM(s) Oral three times a day  Nephro-molly 1 Tablet(s) Oral daily  senna 2 Tablet(s) Oral at bedtime  sodium chloride 0.9%. 1000 milliLiter(s) IV Continuous <Continuous>    PRN Inpatient Medications  dextrose Oral Gel 15 Gram(s) Oral once PRN  polyethylene glycol 3350 17 Gram(s) Oral two times a day PRN      REVIEW OF SYSTEMS  --------------------------------------------------------------------------------    Gen: denies fevers/chills,  CVS: denies chest pain/palpitations  Resp: denies SOB/Cough  GI: Denies N/V/Abd pain  : Denies dysuria/oliguria/hematuria    VITALS/PHYSICAL EXAM  --------------------------------------------------------------------------------  T(C): 36.6 (05-14-24 @ 11:29), Max: 36.6 (05-13-24 @ 20:36)  HR: 76 (05-14-24 @ 11:29) (68 - 76)  BP: 136/63 (05-14-24 @ 11:29) (114/56 - 136/63)  RR: 18 (05-14-24 @ 11:29) (18 - 20)  SpO2: 100% (05-14-24 @ 11:29) (95% - 100%)  Wt(kg): --        05-13-24 @ 07:01  -  05-14-24 @ 07:00  --------------------------------------------------------  IN: 780 mL / OUT: 900 mL / NET: -120 mL    05-14-24 @ 07:01  -  05-14-24 @ 13:46  --------------------------------------------------------  IN: 480 mL / OUT: 0 mL / NET: 480 mL      Physical Exam:  	              Gen: comfortable appearing   	Pulm: decrease bs  no rales or ronchi or wheezing  	CV: No JVD. RRR, S1S2; no rub II/VI M   	Abd: +BS, soft, nontender/nondistended, obese   	UE: Warm, no cyanosis  no clubbing, no edema  	LE: Warm, no cyanosis  no clubbing, 1+ edema,  	Neuro: alert and oriented     LABS/STUDIES  --------------------------------------------------------------------------------              8.7    6.97  >-----------<  189      [05-14-24 @ 07:12]              28.8     131  |  90  |  80  ----------------------------<  462      [05-14-24 @ 07:14]  4.5   |  20  |  3.37        Ca     8.9     [05-14-24 @ 07:14]      Mg     2.3     [05-14-24 @ 07:14]      Phos  4.9     [05-14-24 @ 07:14]    TPro  6.2  /  Alb  3.0  /  TBili  0.6  /  DBili  x   /  AST  16  /  ALT  10  /  AlkPhos  84  [05-14-24 @ 07:14]          Creatinine Trend:  SCr 3.37 [05-14 @ 07:14]  SCr 3.47 [05-13 @ 06:53]  SCr 3.48 [05-12 @ 06:56]  SCr 3.50 [05-11 @ 07:08]  SCr 3.66 [05-10 @ 07:13]          PTH -- (Ca 8.4)      [04-19-24 @ 17:54]   109  TSH 5.06      [04-14-24 @ 07:18]  Lipid: chol 74, TG 70, HDL 33, LDL --      [04-13-24 @ 07:05]

## 2024-05-15 NOTE — PROGRESS NOTE ADULT - NSPROGADDITIONALINFOA_GEN_ALL_CORE
-Plan discussed with pt/team.  Contact info: [phone] (24/7). pager [phone]  Amion on [place]  Teams on M-T-W-F. Unavailable Thu/Weekends/Holidays  Assessed pt/labs/meds and discussed plan of care with primary team  Adjusting insulin  Discharge plan  Follow up care

## 2024-05-15 NOTE — PROGRESS NOTE ADULT - ASSESSMENT
73F w HFrEF (EF 30%), mod AS, known LBBB, HTN, IDDM1, CKD, hypothyroidism, chronic lymphedema, p/w 1 episode of syncope with R arm jerking.     #Syncope-Aortic Stenosis  - Known Aortic Stenosis likely Severe in setting of decreased LVEF  - s/p tavr on 4/24 c/b VT -> shock -> VFib -> shock  - hypoxic/congested, and was intubated, now extubated on 4/25 in the evening, on NC   - on 4/25 with worsening bradycardia, and now s/p TVP placement followed by AV Micra placement with removal of TVP  - Remains in Sinus Rhythm/known lbbb with intermittent   - Vaso has been weaned off with the uptitration of midodrine  - On Midodrine 10mg Q8  - to consider CRT-D in the future  - Cannot initiate GDMT due to blood pressures  - would try to mobilize as best as possible and monitor orthostatics  - cont asa and statin    #Chronic Systolic HF (EF 30%), mod to severe AS, HTN, LBBB, Lymphedema   - Has known systolic HF and AS.    - Repeat TTE showed EF 30%, mild-mod LVH, mildly reduced RVF, mod-severe AS (.61 cmsq), mod pHTN  - On home Torsemide 20 mg daily, Eplerenone 25 mg daily, and Toprol  mg daily, all currently on hold  - Orthostatic, diuretics on hold  - On Midodrine 10mg TID for orthostatic hypotension. Rec'd IVF per Renal 5/14 as well   - initially CVVHD, then HD  - HD now on hold, as she is urinating and creatinine steady.  - prn iv bumex  - renal fu    - overall improving. needs PT  - will follow with you

## 2024-05-15 NOTE — PROGRESS NOTE ADULT - PROBLEM SELECTOR PLAN 2
Likely in setting of deconditioning. Still present    - leg compression, abdominal binder   - PT rec HONEY   - OOB as tolerated   - midodrine 10mg TID.  - May be volume down, getting fluids for DKA Likely in setting of deconditioning.  Improving with fluids  - leg compression, abdominal binder   - PT rec HONEY   - OOB as tolerated   - midodrine 10mg TID.  - May be volume down, getting fluids for DKA

## 2024-05-15 NOTE — PROGRESS NOTE ADULT - SUBJECTIVE AND OBJECTIVE BOX
***************************************************************  Jairo Kaufman, PG1  Internal Medicine   Pager:  TEAMS  ***************************************************************    EVELYN LOPEZ  73y  MRN: [id number]    Patient is a 73y old  Female who presents with a chief complaint of Syncope (14 May 2024 16:15)      Interval/Overnight Events: Anion gap closed yesterday, patients blood sugars responded well and are borderline low.     Subjective: Pt seen and examined at bedside. Denies fever, CP, SOB, abn pain, N/V, dysuria. Tolerating diet.      MEDICATIONS  (STANDING):  ammonium lactate 12% Lotion 1 Application(s) Topical <User Schedule>  aspirin  chewable 81 milliGRAM(s) Oral daily  atorvastatin 80 milliGRAM(s) Oral at bedtime  calamine/zinc oxide Lotion 1 Application(s) Topical three times a day  dextrose 10% Bolus 125 milliLiter(s) IV Bolus once  dextrose 5%. 1000 milliLiter(s) (100 mL/Hr) IV Continuous <Continuous>  dextrose 5%. 1000 milliLiter(s) (50 mL/Hr) IV Continuous <Continuous>  dextrose 50% Injectable 25 Gram(s) IV Push once  dextrose 50% Injectable 12.5 Gram(s) IV Push once  epoetin mendoza-epbx (RETACRIT) Injectable 05713 Unit(s) SubCutaneous every 7 days  ferrous    sulfate 325 milliGRAM(s) Oral two times a day  glucagon  Injectable 1 milliGRAM(s) IntraMuscular once  heparin   Injectable 5000 Unit(s) SubCutaneous every 8 hours  insulin glargine Injectable (LANTUS) 10 Unit(s) SubCutaneous at bedtime  insulin glargine Injectable (LANTUS) 10 Unit(s) SubCutaneous every morning  insulin lispro (ADMELOG) corrective regimen sliding scale   SubCutaneous three times a day before meals  insulin lispro (ADMELOG) corrective regimen sliding scale   SubCutaneous at bedtime  insulin lispro (ADMELOG) corrective regimen sliding scale   SubCutaneous <User Schedule>  insulin lispro Injectable (ADMELOG) 8 Unit(s) SubCutaneous three times a day before meals  levothyroxine 75 MICROGram(s) Oral daily  midodrine 10 milliGRAM(s) Oral three times a day  Nephro-molly 1 Tablet(s) Oral daily  senna 2 Tablet(s) Oral at bedtime    MEDICATIONS  (PRN):  dextrose Oral Gel 15 Gram(s) Oral once PRN Blood Glucose LESS THAN 70 milliGRAM(s)/deciliter  polyethylene glycol 3350 17 Gram(s) Oral two times a day PRN Constipation      Objective:    Vitals: Vital Signs Last 24 Hrs  T(C): 36.9 (05-15-24 @ 05:02), Max: 36.9 (05-15-24 @ 05:02)  T(F): 98.4 (05-15-24 @ 05:02), Max: 98.4 (05-15-24 @ 05:02)  HR: 68 (05-15-24 @ 05:02) (68 - 88)  BP: 135/71 (05-15-24 @ 05:02) (112/64 - 136/63)  BP(mean): --  RR: 18 (05-15-24 @ 05:02) (18 - 18)  SpO2: 100% (05-15-24 @ 05:02) (93% - 100%)                I&O's Summary    13 May 2024 07:01  -  14 May 2024 07:00  --------------------------------------------------------  IN: 780 mL / OUT: 900 mL / NET: -120 mL    14 May 2024 07:01  -  15 May 2024 06:45  --------------------------------------------------------  IN: 960 mL / OUT: 800 mL / NET: 160 mL        PHYSICAL EXAM:  GENERAL: NAD  HEAD:  Atraumatic, Normocephalic  EYES: EOMI, PERRL, conjunctiva and sclera clear  ENMT: Moist mucous membranes  NECK: Supple, No JVD  CHEST/LUNG: Clear to auscultation bilaterally; No rales, rhonchi, wheezing, or rubs  HEART: Regular rate and rhythm, 4/6 systolic murmur  ABDOMEN: Soft, Nontender, Nondistended; Bowel sounds present  EXTREMITIES:  2+ Peripheral Pulses, 3+ b/l edema up to thighs, No clubbing, cyanosis  SKIN: generalized erythema w/ skin sloughing and desquamation, nonpruritic.  Bullae on shins  NERVOUS SYSTEM:  Alert & Oriented X3, Good concentration  PSYCH:  Appropriate affect    LABS:                        8.7    6.97  )-----------( 189      ( 14 May 2024 07:12 )             28.8                         9.4    7.02  )-----------( 194      ( 13 May 2024 06:53 )             30.5                         9.6    8.66  )-----------( 216      ( 12 May 2024 06:57 )             31.5     05-14    135  |  94<L>  |  76<H>  ----------------------------<  84  4.1   |  26  |  3.00<H>  05-14    133<L>  |  93<L>  |  76<H>  ----------------------------<  286<H>  4.3   |  27  |  3.10<H>  05-14    131<L>  |  90<L>  |  80<H>  ----------------------------<  462<HH>  4.5   |  20<L>  |  3.37<H>    Ca    9.2      14 May 2024 22:42  Ca    9.0      14 May 2024 14:19  Ca    8.9      14 May 2024 07:14  Phos  4.9     05-14  Mg     2.3     05-14    TPro  6.2  /  Alb  3.0<L>  /  TBili  0.6  /  DBili  x   /  AST  16  /  ALT  10  /  AlkPhos  84  05-14    CAPILLARY BLOOD GLUCOSE      POCT Blood Glucose.: 101 mg/dL (15 May 2024 01:42)  POCT Blood Glucose.: 101 mg/dL (14 May 2024 21:58)  POCT Blood Glucose.: 174 mg/dL (14 May 2024 18:40)  POCT Blood Glucose.: 191 mg/dL (14 May 2024 17:22)  POCT Blood Glucose.: 302 mg/dL (14 May 2024 14:08)  POCT Blood Glucose.: 368 mg/dL (14 May 2024 12:34)  POCT Blood Glucose.: 447 mg/dL (14 May 2024 10:38)  POCT Blood Glucose.: 463 mg/dL (14 May 2024 09:35)  POCT Blood Glucose.: 469 mg/dL (14 May 2024 08:44)  POCT Blood Glucose.: 488 mg/dL (14 May 2024 08:43)        Urinalysis Basic - ( 14 May 2024 22:42 )    Color: x / Appearance: x / SG: x / pH: x  Gluc: 84 mg/dL / Ketone: x  / Bili: x / Urobili: x   Blood: x / Protein: x / Nitrite: x   Leuk Esterase: x / RBC: x / WBC x   Sq Epi: x / Non Sq Epi: x / Bacteria: x          RADIOLOGY & ADDITIONAL TESTS:    Imaging Personally Reviewed:  [x ] YES  [ ] NO    Consultants involved in case:   Consultant(s) Notes Reviewed:  [ x] YES  [ ] NO:   Care Discussed with Consultants/Other Providers [x ] YES  [ ] NO         ***************************************************************  Jairo Kaufman, PG1  Internal Medicine   Pager:  TEAMS  ***************************************************************    EVELYN LOPEZ  73y  MRN: [id number]    Patient is a 73y old  Female who presents with a chief complaint of Syncope (14 May 2024 16:15)      Interval/Overnight Events: Anion gap closed yesterday, patients blood sugars responded well and are borderline low.     Subjective: Pt seen and examined at bedside. Feels better today.  Denies CP, SOB, abdominal pain, n/v/c/d.  Reports that although she is currently on oxygen, does not need it.     MEDICATIONS  (STANDING):  ammonium lactate 12% Lotion 1 Application(s) Topical <User Schedule>  aspirin  chewable 81 milliGRAM(s) Oral daily  atorvastatin 80 milliGRAM(s) Oral at bedtime  calamine/zinc oxide Lotion 1 Application(s) Topical three times a day  dextrose 10% Bolus 125 milliLiter(s) IV Bolus once  dextrose 5%. 1000 milliLiter(s) (100 mL/Hr) IV Continuous <Continuous>  dextrose 5%. 1000 milliLiter(s) (50 mL/Hr) IV Continuous <Continuous>  dextrose 50% Injectable 25 Gram(s) IV Push once  dextrose 50% Injectable 12.5 Gram(s) IV Push once  epoetin mendoza-epbx (RETACRIT) Injectable 86061 Unit(s) SubCutaneous every 7 days  ferrous    sulfate 325 milliGRAM(s) Oral two times a day  glucagon  Injectable 1 milliGRAM(s) IntraMuscular once  heparin   Injectable 5000 Unit(s) SubCutaneous every 8 hours  insulin glargine Injectable (LANTUS) 10 Unit(s) SubCutaneous at bedtime  insulin glargine Injectable (LANTUS) 10 Unit(s) SubCutaneous every morning  insulin lispro (ADMELOG) corrective regimen sliding scale   SubCutaneous three times a day before meals  insulin lispro (ADMELOG) corrective regimen sliding scale   SubCutaneous at bedtime  insulin lispro (ADMELOG) corrective regimen sliding scale   SubCutaneous <User Schedule>  insulin lispro Injectable (ADMELOG) 8 Unit(s) SubCutaneous three times a day before meals  levothyroxine 75 MICROGram(s) Oral daily  midodrine 10 milliGRAM(s) Oral three times a day  Nephro-molly 1 Tablet(s) Oral daily  senna 2 Tablet(s) Oral at bedtime    MEDICATIONS  (PRN):  dextrose Oral Gel 15 Gram(s) Oral once PRN Blood Glucose LESS THAN 70 milliGRAM(s)/deciliter  polyethylene glycol 3350 17 Gram(s) Oral two times a day PRN Constipation      Objective:    Vitals: Vital Signs Last 24 Hrs  T(C): 36.9 (05-15-24 @ 05:02), Max: 36.9 (05-15-24 @ 05:02)  T(F): 98.4 (05-15-24 @ 05:02), Max: 98.4 (05-15-24 @ 05:02)  HR: 68 (05-15-24 @ 05:02) (68 - 88)  BP: 135/71 (05-15-24 @ 05:02) (112/64 - 136/63)  BP(mean): --  RR: 18 (05-15-24 @ 05:02) (18 - 18)  SpO2: 100% (05-15-24 @ 05:02) (93% - 100%)                I&O's Summary    13 May 2024 07:01  -  14 May 2024 07:00  --------------------------------------------------------  IN: 780 mL / OUT: 900 mL / NET: -120 mL    14 May 2024 07:01  -  15 May 2024 06:45  --------------------------------------------------------  IN: 960 mL / OUT: 800 mL / NET: 160 mL        PHYSICAL EXAM:  GENERAL: NAD  HEAD:  Atraumatic, Normocephalic  EYES: EOMI, PERRL, conjunctiva and sclera clear  ENMT: Moist mucous membranes  NECK: Supple  CHEST/LUNG: Clear to auscultation bilaterally; No rales, rhonchi, wheezing, or rubs  HEART: Regular rate and rhythm, 4/6 systolic murmur  ABDOMEN: Soft, Nontender, Nondistended; Bowel sounds present  EXTREMITIES:  2+ Peripheral Pulses, 3+ b/l edema up to thighs, No clubbing, cyanosis  SKIN: generalized erythema w/ skin sloughing and desquamation, nonpruritic. Improving  NERVOUS SYSTEM:  Alert & Oriented X3, no focal deficits  PSYCH:  Appropriate affect    LABS:                        8.7    6.97  )-----------( 189      ( 14 May 2024 07:12 )             28.8                         9.4    7.02  )-----------( 194      ( 13 May 2024 06:53 )             30.5                         9.6    8.66  )-----------( 216      ( 12 May 2024 06:57 )             31.5     05-14    135  |  94<L>  |  76<H>  ----------------------------<  84  4.1   |  26  |  3.00<H>  05-14    133<L>  |  93<L>  |  76<H>  ----------------------------<  286<H>  4.3   |  27  |  3.10<H>  05-14    131<L>  |  90<L>  |  80<H>  ----------------------------<  462<HH>  4.5   |  20<L>  |  3.37<H>    Ca    9.2      14 May 2024 22:42  Ca    9.0      14 May 2024 14:19  Ca    8.9      14 May 2024 07:14  Phos  4.9     05-14  Mg     2.3     05-14    TPro  6.2  /  Alb  3.0<L>  /  TBili  0.6  /  DBili  x   /  AST  16  /  ALT  10  /  AlkPhos  84  05-14    CAPILLARY BLOOD GLUCOSE      POCT Blood Glucose.: 101 mg/dL (15 May 2024 01:42)  POCT Blood Glucose.: 101 mg/dL (14 May 2024 21:58)  POCT Blood Glucose.: 174 mg/dL (14 May 2024 18:40)  POCT Blood Glucose.: 191 mg/dL (14 May 2024 17:22)  POCT Blood Glucose.: 302 mg/dL (14 May 2024 14:08)  POCT Blood Glucose.: 368 mg/dL (14 May 2024 12:34)  POCT Blood Glucose.: 447 mg/dL (14 May 2024 10:38)  POCT Blood Glucose.: 463 mg/dL (14 May 2024 09:35)  POCT Blood Glucose.: 469 mg/dL (14 May 2024 08:44)  POCT Blood Glucose.: 488 mg/dL (14 May 2024 08:43)        Urinalysis Basic - ( 14 May 2024 22:42 )    Color: x / Appearance: x / SG: x / pH: x  Gluc: 84 mg/dL / Ketone: x  / Bili: x / Urobili: x   Blood: x / Protein: x / Nitrite: x   Leuk Esterase: x / RBC: x / WBC x   Sq Epi: x / Non Sq Epi: x / Bacteria: x          RADIOLOGY & ADDITIONAL TESTS:    Imaging Personally Reviewed:  [x ] YES  [ ] NO    Consultants involved in case:   Consultant(s) Notes Reviewed:  [ x] YES  [ ] NO:   Care Discussed with Consultants/Other Providers [x ] YES  [ ] NO

## 2024-05-15 NOTE — PROGRESS NOTE ADULT - SUBJECTIVE AND OBJECTIVE BOX
[facility] - Howard Kimble, Carlos Luong, Herman Alston  Office Number:  [phone]    Patient resting comfortably in bed in NAD.  Laying flat with no respiratory distress.  No complaints of chest pain, dyspnea, palpitations, PND, or orthopnea.  No complaints this morning just tired    ROS: negative unless otherwise mentioned.      MEDICATIONS  (STANDING):  ammonium lactate 12% Lotion 1 Application(s) Topical <User Schedule>  aspirin  chewable 81 milliGRAM(s) Oral daily  atorvastatin 80 milliGRAM(s) Oral at bedtime  calamine/zinc oxide Lotion 1 Application(s) Topical three times a day  dextrose 10% Bolus 125 milliLiter(s) IV Bolus once  dextrose 5%. 1000 milliLiter(s) (50 mL/Hr) IV Continuous <Continuous>  dextrose 5%. 1000 milliLiter(s) (100 mL/Hr) IV Continuous <Continuous>  dextrose 50% Injectable 25 Gram(s) IV Push once  dextrose 50% Injectable 12.5 Gram(s) IV Push once  epoetin mendoza-epbx (RETACRIT) Injectable 10872 Unit(s) SubCutaneous every 7 days  ferrous    sulfate 325 milliGRAM(s) Oral two times a day  glucagon  Injectable 1 milliGRAM(s) IntraMuscular once  heparin   Injectable 5000 Unit(s) SubCutaneous every 8 hours  insulin glargine Injectable (LANTUS) 10 Unit(s) SubCutaneous at bedtime  insulin lispro (ADMELOG) corrective regimen sliding scale   SubCutaneous three times a day before meals  insulin lispro (ADMELOG) corrective regimen sliding scale   SubCutaneous <User Schedule>  insulin lispro Injectable (ADMELOG) 8 Unit(s) SubCutaneous three times a day before meals  levothyroxine 75 MICROGram(s) Oral daily  midodrine 10 milliGRAM(s) Oral three times a day  Nephro-molly 1 Tablet(s) Oral daily  potassium chloride   Powder 40 milliEquivalent(s) Oral once  senna 2 Tablet(s) Oral at bedtime    MEDICATIONS  (PRN):  dextrose Oral Gel 15 Gram(s) Oral once PRN Blood Glucose LESS THAN 70 milliGRAM(s)/deciliter  polyethylene glycol 3350 17 Gram(s) Oral two times a day PRN Constipation      Allergies    contrast media (iodine-based) (Fever; Vomiting; Flushing; Hypotension; Rash; Stomach Upset)  Ativan (Rash; Urticaria)  penicillins (Hives (Mod to Severe); Anaphylaxis (Mod to Severe); Short breath (Mod to Severe); Angioedema (Mod to Severe))    Intolerances        Vital Signs Last 24 Hrs  T(C): 36.9 (15 May 2024 05:02), Max: 36.9 (15 May 2024 05:02)  T(F): 98.4 (15 May 2024 05:02), Max: 98.4 (15 May 2024 05:02)  HR: 68 (15 May 2024 05:02) (68 - 88)  BP: 135/71 (15 May 2024 05:02) (112/64 - 136/63)  BP(mean): --  RR: 18 (15 May 2024 05:02) (18 - 18)  SpO2: 100% (15 May 2024 05:02) (93% - 100%)    Parameters below as of 15 May 2024 05:02  Patient On (Oxygen Delivery Method): nasal cannula        I&O's Summary    14 May 2024 07:01  -  15 May 2024 07:00  --------------------------------------------------------  IN: 960 mL / OUT: 800 mL / NET: 160 mL    15 May 2024 07:01  -  15 May 2024 10:45  --------------------------------------------------------  IN: 100 mL / OUT: 0 mL / NET: 100 mL        ON EXAM:    General: NAD, awake and alert, oriented x 3  HEENT: Mucous membranes are moist, anicteric  Lungs: Non-labored, breath sounds are clear bilaterally, No wheezing, rales or rhonchi  Cardiovascular: Regular, S1 and S2, harsh systolic murmur  Gastrointestinal: Bowel Sounds present, soft, nontender.   Lymph: chronic lymphedema. Wrapped   Skin: No rashes or ulcers  Psych:  Mood & affect appropriate    LABS: All Labs Reviewed:                        9.0    8.84  )-----------( 209      ( 15 May 2024 07:08 )             30.0                         8.7    6.97  )-----------( 189      ( 14 May 2024 07:12 )             28.8                         9.4    7.02  )-----------( 194      ( 13 May 2024 06:53 )             30.5     15 May 2024 07:09    134    |  95     |  72     ----------------------------<  97     3.6     |  25     |  2.86   14 May 2024 22:42    135    |  94     |  76     ----------------------------<  84     4.1     |  26     |  3.00   14 May 2024 14:19    133    |  93     |  76     ----------------------------<  286    4.3     |  27     |  3.10     Ca    8.5        15 May 2024 07:09  Ca    9.2        14 May 2024 22:42  Ca    9.0        14 May 2024 14:19  Phos  3.9       15 May 2024 07:09  Phos  4.9       14 May 2024 07:14  Phos  5.1       13 May 2024 06:53  Mg     2.3       15 May 2024 07:09  Mg     2.3       14 May 2024 07:14  Mg     2.4       13 May 2024 06:53    TPro  6.0    /  Alb  2.9    /  TBili  0.4    /  DBili  x      /  AST  18     /  ALT  11     /  AlkPhos  76     15 May 2024 07:09  TPro  6.2    /  Alb  3.0    /  TBili  0.6    /  DBili  x      /  AST  16     /  ALT  10     /  AlkPhos  84     14 May 2024 07:14          Blood Culture:

## 2024-05-15 NOTE — PROGRESS NOTE ADULT - PROBLEM SELECTOR PLAN 3
DUNCAN secondary to ATN from hypotension and contrast nephropathy. CKD stage 4. s/p CRRT and bumex drip in CICU.  Cr 3.08-> 3.57 -> 3.66 -> 3.48.  Improving, hardy for urinary retention (placed 5/10)    - f/u nephro recs   - strict I's and O's  - renally dose meds, avoid nephrotoxic agents. DUNCAN secondary to ATN from hypotension and contrast nephropathy. CKD stage 4. s/p CRRT and bumex drip in CICU.  Cr 3.08-> 3.57 -> 3.66 -> 3.48.  [place]za for urinary retention (placed 5/10).  Improving with fluids    - f/u nephro recs   - strict I's and O's  - renally dose meds, avoid nephrotoxic agents.

## 2024-05-15 NOTE — PROGRESS NOTE ADULT - PROBLEM SELECTOR PLAN 1
Home regimen: lantus 33u qhs w/ premeal sliding scale. A1c 7.4% in 3/2024. multiple hypoglycemic episodes, but now hyperglycemic with anion gap concerning for developing DKA.      - endo following, appreciate recs   - For DKA, will make NPO  - Lantus 10U STAT this AM  - Lantus 10U qHS  - MDSSI and LDSSI at night  - Gentle fluids  - q4 finger sticks  - F/u Beta hydroxybutyrate  - hypoglycemia protocol. Home regimen: lantus 33u qhs w/ premeal sliding scale. A1c 7.4% in 3/2024. multiple hypoglycemic episodes, DKA now resolved, anion gap closed.  Close discussions with endocrine ongoing regarding management.    - endo following, appreciate recs   - Diet resumed  - Holding AM Lantus  - Lantus 10U qHS  - Dosing premeal insulin per endocrine recs  - MDSSI and LDSSI at night  - premeal and bedtime fingersticks  - hypoglycemia protocol.

## 2024-05-15 NOTE — PROGRESS NOTE ADULT - ASSESSMENT
73F w/h/o of T1DM with unknown control due to CKD and anemia of chronic disease. DM c/b CKD. Also h/o HFrEF (EF 30%), mod AS, known LBBB, HTN,  hypothyroidism, chronic lymphedema, suspected OHS/LELIA on 3L NC home O2 p/w 1 episode of syncope with R arm jerking, likely 2/2 severe symptomatic AS. S/p AVR 4/24 c/b DUNCAN on CKD, fever and hypotension. Also UTI/pul edema/ sepsis and L adrenal nodule finding. Endocrine consult done for DM and adrenal nodule management. Tolerating POs with mild DKA yesterday without any clear reason/cause. BG improved greatly but pt received a total of Lantus 20 units yesterday plus 33 units of Admelog. BGs today are within goal likely due to some insulin effect from yesterday in setting of pt's CKD. Will increase basal insulin at night slowly to prevent rebound hypoglycemia. Discussed this with pt/relative and team. Checking BG at 2am to evaluate any rebound hypo/hyperglycemia. will adjust insulin as needed to keep BG goal 100 to 180s. No hypoglycemia.   Noted creat improved with IVF yesterday.     Per endocrine attending Dr Burton> Adrenal nodule work up completed and pt will need to f/u once she is more stable as out pt.       Home regimen: Lantus 33 units qhs, Novolog based on sliding scale TIDAC normally 3-5 units  Has Dexcom G7

## 2024-05-15 NOTE — PROGRESS NOTE ADULT - PROBLEM SELECTOR PLAN 9
Incidental finding on CT C/A/P w/ thickened L adrenal gland and 1.2 x 0.8 cm L adrenal nodule.  Two positive DST, concerning for cushing syndrome. Endo following.    - no indication for txt at this time  - f/u outpatient w/ Dr. Maynor Dumont for repeat testing.

## 2024-05-15 NOTE — PROGRESS NOTE ADULT - SUBJECTIVE AND OBJECTIVE BOX
[facility]   [phone]  RENAL FOLLOW UP NOTE  --------------------------------------------------------------------------------  Chief Complaint:    24 hour events/subjective:    seen earlier   stated feels better        PAST HISTORY  --------------------------------------------------------------------------------  No significant changes to PMH, PSH, FHx, SHx, unless otherwise noted    ALLERGIES & MEDICATIONS  --------------------------------------------------------------------------------  Allergies    contrast media (iodine-based) (Fever; Vomiting; Flushing; Hypotension; Rash; Stomach Upset)  Ativan (Rash; Urticaria)  penicillins (Hives (Mod to Severe); Anaphylaxis (Mod to Severe); Short breath (Mod to Severe); Angioedema (Mod to Severe))    Intolerances      Standing Inpatient Medications  ammonium lactate 12% Lotion 1 Application(s) Topical <User Schedule>  aspirin  chewable 81 milliGRAM(s) Oral daily  atorvastatin 80 milliGRAM(s) Oral at bedtime  calamine/zinc oxide Lotion 1 Application(s) Topical three times a day  dextrose 10% Bolus 125 milliLiter(s) IV Bolus once  dextrose 5%. 1000 milliLiter(s) IV Continuous <Continuous>  dextrose 5%. 1000 milliLiter(s) IV Continuous <Continuous>  dextrose 50% Injectable 25 Gram(s) IV Push once  dextrose 50% Injectable 12.5 Gram(s) IV Push once  epoetin mendoza-epbx (RETACRIT) Injectable 01189 Unit(s) SubCutaneous every 7 days  ferrous    sulfate 325 milliGRAM(s) Oral two times a day  glucagon  Injectable 1 milliGRAM(s) IntraMuscular once  heparin   Injectable 5000 Unit(s) SubCutaneous every 8 hours  insulin glargine Injectable (LANTUS) 12 Unit(s) SubCutaneous at bedtime  insulin lispro (ADMELOG) corrective regimen sliding scale   SubCutaneous <User Schedule>  insulin lispro (ADMELOG) corrective regimen sliding scale   SubCutaneous three times a day before meals  insulin lispro Injectable (ADMELOG) 8 Unit(s) SubCutaneous three times a day before meals  lactated ringers. 500 milliLiter(s) IV Continuous <Continuous>  levothyroxine 75 MICROGram(s) Oral daily  midodrine 10 milliGRAM(s) Oral three times a day  Nephro-molly 1 Tablet(s) Oral daily  senna 2 Tablet(s) Oral at bedtime    PRN Inpatient Medications  dextrose Oral Gel 15 Gram(s) Oral once PRN  polyethylene glycol 3350 17 Gram(s) Oral two times a day PRN      REVIEW OF SYSTEMS  --------------------------------------------------------------------------------    Gen: denies  fevers/chills,  CVS: denies chest pain/palpitations  Resp: denies SOB/Cough  GI: Denies N/V/Abd pain  : Denies dysuria or decrease urination     VITALS/PHYSICAL EXAM  --------------------------------------------------------------------------------  T(C): 36.8 (05-15-24 @ 20:17), Max: 36.9 (05-15-24 @ 05:02)  HR: 78 (05-15-24 @ 20:17) (66 - 86)  BP: 133/66 (05-15-24 @ 16:17) (119/64 - 135/71)  RR: 18 (05-15-24 @ 11:29) (18 - 18)  SpO2: 95% (05-15-24 @ 20:17) (95% - 100%)  Wt(kg): --        05-14-24 @ 07:01  -  05-15-24 @ 07:00  --------------------------------------------------------  IN: 960 mL / OUT: 800 mL / NET: 160 mL    05-15-24 @ 07:01  -  05-15-24 @ 21:12  --------------------------------------------------------  IN: 840 mL / OUT: 1500 mL / NET: -660 mL      Physical Exam:  	           Gen: comfortable appearing   	Pulm: decrease bs  no rales or ronchi or wheezing  	CV: No JVD. RRR, S1S2; no rub II/VI M   	Abd: +BS, soft, nontender/nondistended, obese   	UE: Warm, no cyanosis  no clubbing, no edema  	LE: Warm, no cyanosis  no clubbing, 1+ edema,  	Neuro: alert and oriented       LABS/STUDIES  --------------------------------------------------------------------------------              9.0    8.84  >-----------<  209      [05-15-24 @ 07:08]              30.0     134  |  95  |  72  ----------------------------<  97      [05-15-24 @ 07:09]  3.6   |  25  |  2.86        Ca     8.5     [05-15-24 @ 07:09]      Mg     2.3     [05-15-24 @ 07:09]      Phos  3.9     [05-15-24 @ 07:09]    TPro  6.0  /  Alb  2.9  /  TBili  0.4  /  DBili  x   /  AST  18  /  ALT  11  /  AlkPhos  76  [05-15-24 @ 07:09]          Creatinine Trend:  SCr 2.86 [05-15 @ 07:09]  SCr 3.00 [05-14 @ 22:42]  SCr 3.10 [05-14 @ 14:19]  SCr 3.37 [05-14 @ 07:14]  SCr 3.47 [05-13 @ 06:53]        PTH -- (Ca 8.4)      [04-19-24 @ 17:54]   109  TSH 5.06      [04-14-24 @ 07:18]  Lipid: chol 74, TG 70, HDL 33, LDL --      [04-13-24 @ 07:05]

## 2024-05-15 NOTE — PROGRESS NOTE ADULT - NUTRITIONAL ASSESSMENT
Diet, Consistent Carbohydrate w/Evening Snack (05-14-24 @ 17:35) [Active]        Please see RD assessment and/or follow up.  Managed by primary team as well

## 2024-05-15 NOTE — PROGRESS NOTE ADULT - NUTRITIONAL ASSESSMENT
This patient has been assessed with a concern for Malnutrition and has been determined to have a diagnosis/diagnoses of Morbid obesity (BMI > 40).    This patient is being managed with:   Diet Consistent Carbohydrate w/Evening Snack-  Entered: May 14 2024 10:23AM

## 2024-05-15 NOTE — PROGRESS NOTE ADULT - PROBLEM SELECTOR PLAN 4
Presented for syncope secondary to severe AS, s/p TAVR on 4/24 c/b by VT -> shock -> VFib -> shock.  Course complicated w/ symptomatic bradycardia, requiring TVP, now s/p micra PPM.     - continue aspirin 81mg daily

## 2024-05-15 NOTE — PROGRESS NOTE ADULT - PROBLEM SELECTOR PLAN 5
EF from 4/2024 (post-TAVR) w/ EF 25%, global LV hypokinesis, moderately dilated LV, grade 2 LV diastolic dysfunction, RA moderately dilated.    - cards following   - continue midodrine 10mg TID   - Continue atorvastatin 80mg daily  - hold home torsemide 20mg qd  - hold home eplerenone 25mg qd  - hold home metoprolol succinate 100mg qd  - restart GDMT as tolerated  - f/u w/ EP outpatient for CRT. EF from 4/2024 (post-TAVR) w/ EF 25%, global LV hypokinesis, moderately dilated LV, grade 2 LV diastolic dysfunction, RA moderately dilated.      - cards following   - Will f/u with cardiology regarding resumption of GDMT  - continue midodrine 10mg TID   - Continue atorvastatin 80mg daily  - hold home torsemide 20mg qd  - hold home eplerenone 25mg qd  - hold home metoprolol succinate 100mg qd  - restart GDMT as tolerated  - f/u w/ EP outpatient for CRT.

## 2024-05-15 NOTE — PROGRESS NOTE ADULT - ATTENDING COMMENTS
73F PMH: HFrEF (EF 30%), AS, LBBB, HTN, DM1, CKD, hypothyroidism, chronic lymphedema, LELIA (3L home O2) p/w for syncope 2/2 severe AS, s/p TAVR 4/24. Course complicated by contrast induced allergic reaction (had CTA for TAVR eval) and contrast related renal failure (acute on chronic) requiring HD. TAVR c/b VT and vfib requiring shock and flash pulmonary edema causing AHRF requiring intubation. Course as documented.     Hyperglycemia and DKA yesterday, now resolved.     Persistently orthostatic today despite fluids. To get another 500ml. Increase Midodrine if persists.

## 2024-05-15 NOTE — PROGRESS NOTE ADULT - PROBLEM SELECTOR PLAN 1
- Test BG ac and hs/2am  - Adjust Lantus to 12 units at hs for now. Will re-assess daily   - C/w Admelog dose  8 units TIDAC for now> will adjust as needed  - C/w Low dose admelog correction scale TIDAC, Low dose admelog correction scale QHS and 2am in case of rebound hypo/hyperglycemia  -Will follow  Discharge:  Will be determined according to BG/insulin needs/PO intake  at time of discharge. Basal/bolus insulin doses TBD  - Of note, patient instructed on discharge from recent hospitalization at [place] to start farxiga for CHF. Given risks of DKA of SGLT2i in T1DM, would not start until better data is available for its benefits.  - Follow up with Dr. Luis Dumont outpatient  -Pt will likely required rehab  F/u with optho/renal/cardiac as out pt - Test BG ac and hs/2am  - Adjust Lantus to 12 units at hs for now. If BG at bedtime <100 please administer 10 units and provide snack. Will re-assess daily   - C/w Admelog dose  8 units TIDAC for now> will adjust as needed  - C/w Low dose admelog correction scale TIDAC, Low dose admelog correction scale QHS and 2am in case of rebound hypo/hyperglycemia  -Will follow  Discharge:  Will be determined according to BG/insulin needs/PO intake  at time of discharge. Basal/bolus insulin doses TBD  - Of note, patient instructed on discharge from recent hospitalization at [place] to start farxiga for CHF. Given risks of DKA of SGLT2i in T1DM, would not start until better data is available for its benefits.  - Follow up with Dr. Luis Dumont outpatient  -Pt will likely required rehab  F/u with optho/renal/cardiac as out pt

## 2024-05-15 NOTE — PROGRESS NOTE ADULT - SUBJECTIVE AND OBJECTIVE BOX
DIABETES FOLLOW UP NOTE: Saw pt earlier today    Chief Complaint: Endocrine consult requested for management of DM    INTERVAL HX: Pt feeling better today and tolerating POs. No  N/V/abdominal pain reported. BG levels greatly improved but noted some values <100s yesterday night to this am. AM Lantus held this am due to BG of 125 and BMP glucose of 97 > f/u BG for lunch was 139. No hypoglycemia and creat improved.         Review of Systems:  General: As above  Cardiovascular: No chest pain, palpitations  Respiratory: No SOB, no cough  GI: No nausea, vomiting, abdominal pain  Endocrine: No polyuria, polydipsia or S&Sx of hypoglycemia    Allergies    contrast media (iodine-based) (Fever; Vomiting; Flushing; Hypotension; Rash; Stomach Upset)  Ativan (Rash; Urticaria)  penicillins (Hives (Mod to Severe); Anaphylaxis (Mod to Severe); Short breath (Mod to Severe); Angioedema (Mod to Severe))    Intolerances      MEDICATIONS:  atorvastatin 80 milliGRAM(s) Oral at bedtime  insulin glargine Injectable (LANTUS) 10 Unit(s) SubCutaneous at bedtime  insulin lispro (ADMELOG) corrective regimen sliding scale   SubCutaneous three times a day before meals  insulin lispro (ADMELOG) corrective regimen sliding scale   SubCutaneous <User Schedule>  insulin lispro Injectable (ADMELOG) 8 Unit(s) SubCutaneous three times a day before meals  levothyroxine 75 MICROGram(s) Oral daily      PHYSICAL EXAM:  VITALS: T(C): 36.5 (05-15-24 @ 11:29)  T(F): 97.7 (05-15-24 @ 11:29), Max: 98.4 (05-15-24 @ 05:02)  HR: 86 (05-15-24 @ 16:17) (66 - 88)  BP: 133/66 (05-15-24 @ 16:17) (112/64 - 135/71)  RR:  (18 - 18)  SpO2:  (93% - 100%)  Wt(kg): --  GENERAL: Female laying in bed in NAD. Relative at bedside  Abdomen: Soft, nontender, non distended. + obesity  Extremities: Warm,+ edema in LE with dressings on D&I.  NEURO: A&O X3    LABS:  POCT Blood Glucose.: 165 mg/dL (05-15-24 @ 17:23)  POCT Blood Glucose.: 139 mg/dL (05-15-24 @ 12:57)  POCT Blood Glucose.: 137 mg/dL (05-15-24 @ 08:32)  POCT Blood Glucose.: 125 mg/dL (05-15-24 @ 07:07)  POCT Blood Glucose.: 101 mg/dL (05-15-24 @ 01:42)  POCT Blood Glucose.: 101 mg/dL (05-14-24 @ 21:58)  POCT Blood Glucose.: 174 mg/dL (05-14-24 @ 18:40)  POCT Blood Glucose.: 191 mg/dL (05-14-24 @ 17:22)  POCT Blood Glucose.: 302 mg/dL (05-14-24 @ 14:08)  POCT Blood Glucose.: 368 mg/dL (05-14-24 @ 12:34)  POCT Blood Glucose.: 447 mg/dL (05-14-24 @ 10:38)  POCT Blood Glucose.: 463 mg/dL (05-14-24 @ 09:35)  POCT Blood Glucose.: 469 mg/dL (05-14-24 @ 08:44)  POCT Blood Glucose.: 488 mg/dL (05-14-24 @ 08:43)  POCT Blood Glucose.: 221 mg/dL (05-13-24 @ 21:44)  POCT Blood Glucose.: 232 mg/dL (05-13-24 @ 17:23)  POCT Blood Glucose.: 287 mg/dL (05-13-24 @ 12:49)  POCT Blood Glucose.: 284 mg/dL (05-13-24 @ 08:41)  POCT Blood Glucose.: 275 mg/dL (05-12-24 @ 21:41)  POCT Blood Glucose.: 283 mg/dL (05-12-24 @ 18:24)                            9.0    8.84  )-----------( 209      ( 15 May 2024 07:08 )             30.0       05-15    134<L>  |  95<L>  |  72<H>  ----------------------------<  97  3.6   |  25  |  2.86<H>    eGFR: 17<L>    Ca    8.5      05-15  Mg     2.3     05-15  Phos  3.9     05-15    TPro  6.0  /  Alb  2.9<L>  /  TBili  0.4  /  DBili  x   /  AST  18  /  ALT  11  /  AlkPhos  76  05-15      eGFR: 17 mL/min/1.73m2 (05-15-24 @ 07:09)  eGFR: 16 mL/min/1.73m2 (05-14-24 @ 22:42)        Thyroid Function Tests:      A1C with Estimated Average Glucose Result: 7.4 % (03-30-24 @ 08:21)      Estimated Average Glucose: 166 mg/dL (03-30-24 @ 08:21)        04-13 Chol 74 Direct LDL -- LDL calculated 25 HDL 33<L> Trig 70

## 2024-05-15 NOTE — PROGRESS NOTE ADULT - ASSESSMENT
73F w HFrEF (EF 30%), mod AS, known LBBB, HTN, IDDM1, CKD, hypothyroidism, chronic lymphedema, suspected OHS/LELIA on 3L NC home O2 p/w 1 episode of syncope. Recent admission at [facility] (3/29-4/5) for ADHF and DUNCAN s/p diuresis, discharged with new home O2 3L NC suspecting OHS/LELIA pending outpatient w/u. Since discharge a week ago, she has been staying at home with   Pt had sob walking to car. Once in car, she was still breathing heavily. Partner noticed pt was not responding to verbal stimuli for 10-15sec and witnessed R arm jerking. Pt gained consciousness quickly without postictal symptoms. Pt went to Cardiologist Dr. Nathan who recommended pt to come to ED. No fever, cp, abd pain, n/v. Leg swelling is improved from prior. Pt on torsemide 40mg which she has been taking. Pt was discharged on 3LNC which she is currently on. Patient reports she did not take Farxiga that she was discharged on as she was concerned about side effects.     In ED, patient was found afebrile, hemodynamically stable, breathing well on 3L NC. Initial labs notable for mild hyponatremia, DUNCAN on CKD, elevated proBNP and elevated pCO2 both improved from prior.  pt also noticed with abn creatinine. had severe AS had ct scan with IV contrast had contrast nephropathy and hypotension ---> CRRT required       1- CKD IV  with DUNCAN   2- CHF   3- lymphedema   4- severe AS  s/p tavr 4/24  5- hypotension       creatinine is slowly downtrending  anemia, trend hgb  retacrit 10,000 units weekly  non-oliguric, off Hd   hardy re-inserted for urinary retention  UTI completed ceftriaxone  orthostatic hypotension, continue midodrine 10 mg tid,  trend bp  may need further ivf hydration   endo following  strict I/O   trend creatinine and electrolytes daily

## 2024-05-15 NOTE — PROGRESS NOTE ADULT - PROBLEM SELECTOR PLAN 6
U/A w/ large LE, 501 WBC, 5 RBC and mod bacteria. Urine cx growing 10-49k Vancomycin resistant enterococcus faecalis. Will hold on amoxicillin given asymptomatic nature of UTI    - s/p ceftriaxone (5/8-5/10)

## 2024-05-16 NOTE — OCCUPATIONAL THERAPY INITIAL EVALUATION ADULT - PERSONAL SAFETY AND JUDGMENT, REHAB EVAL
Called patient back. She reports SBP 120s with isolated SBP up to 145 over the past 2-3 weeks. States her systolic blood pressure is never 170s at home as it is in the office. Advised to bring blood pressure cuff to next appointment to compare in office. Also denies any episodes of hypoglycemia, lowest glucose level was 77. We also discussed recent mammogram results and upcoming repeat right breast imaging. Reminder of date and time (6/19/2024 at 8:30 AM and 9:00 AM).    Shanon Bhagat MD  [facility] Internal Medicine, PRG[facility]2  5/16/2024    intact

## 2024-05-16 NOTE — PROGRESS NOTE ADULT - PROBLEM SELECTOR PLAN 6
U/A w/ large LE, 501 WBC, 5 RBC and mod bacteria. Urine cx growing 10-49k Vancomycin resistant enterococcus faecalis. Will hold on amoxicillin given asymptomatic nature of UTI    - s/p ceftriaxone (5/8-5/10) U/A w/ large LE, 501 WBC, 5 RBC and mod bacteria. Urine cx growing 10-49k Vancomycin resistant enterococcus faecalis. Will hold on amoxicillin given asymptomatic nature of UTI    - s/p ceftriaxone (5/8-5/10)  - Trial of void today

## 2024-05-16 NOTE — PROGRESS NOTE ADULT - SUBJECTIVE AND OBJECTIVE BOX
***************************************************************  Jairo Kaufman, PG1  Internal Medicine   Pager:  TEAMS  ***************************************************************    EVELYN LOPEZ  73y  MRN: [id number]    Patient is a 73y old  Female who presents with a chief complaint of Syncope (15 May 2024 21:12)      Interval/Overnight Events: Orthostatic again yesterday.    Subjective: Pt seen and examined at bedside. Denies fever, CP, SOB, abn pain, N/V, dysuria. Tolerating diet.      MEDICATIONS  (STANDING):  ammonium lactate 12% Lotion 1 Application(s) Topical <User Schedule>  aspirin  chewable 81 milliGRAM(s) Oral daily  atorvastatin 80 milliGRAM(s) Oral at bedtime  calamine/zinc oxide Lotion 1 Application(s) Topical three times a day  dextrose 10% Bolus 125 milliLiter(s) IV Bolus once  dextrose 5%. 1000 milliLiter(s) (100 mL/Hr) IV Continuous <Continuous>  dextrose 5%. 1000 milliLiter(s) (50 mL/Hr) IV Continuous <Continuous>  dextrose 50% Injectable 12.5 Gram(s) IV Push once  dextrose 50% Injectable 25 Gram(s) IV Push once  epoetin mendoza-epbx (RETACRIT) Injectable 12560 Unit(s) SubCutaneous every 7 days  ferrous    sulfate 325 milliGRAM(s) Oral two times a day  glucagon  Injectable 1 milliGRAM(s) IntraMuscular once  heparin   Injectable 5000 Unit(s) SubCutaneous every 8 hours  insulin glargine Injectable (LANTUS) 12 Unit(s) SubCutaneous at bedtime  insulin lispro (ADMELOG) corrective regimen sliding scale   SubCutaneous three times a day before meals  insulin lispro (ADMELOG) corrective regimen sliding scale   SubCutaneous <User Schedule>  insulin lispro Injectable (ADMELOG) 8 Unit(s) SubCutaneous three times a day before meals  lactated ringers. 500 milliLiter(s) (100 mL/Hr) IV Continuous <Continuous>  levothyroxine 75 MICROGram(s) Oral daily  midodrine 10 milliGRAM(s) Oral three times a day  Nephro-molly 1 Tablet(s) Oral daily  senna 2 Tablet(s) Oral at bedtime    MEDICATIONS  (PRN):  dextrose Oral Gel 15 Gram(s) Oral once PRN Blood Glucose LESS THAN 70 milliGRAM(s)/deciliter  polyethylene glycol 3350 17 Gram(s) Oral two times a day PRN Constipation      Objective:    Vitals: Vital Signs Last 24 Hrs  T(C): 36.9 (05-16-24 @ 05:16), Max: 36.9 (05-16-24 @ 05:16)  T(F): 98.4 (05-16-24 @ 05:16), Max: 98.4 (05-16-24 @ 05:16)  HR: 75 (05-16-24 @ 05:16) (66 - 86)  BP: 138/67 (05-16-24 @ 05:16) (119/64 - 138/67)  BP(mean): --  RR: 18 (05-16-24 @ 05:16) (18 - 18)  SpO2: 97% (05-16-24 @ 05:16) (95% - 99%)                I&O's Summary    14 May 2024 07:01  -  15 May 2024 07:00  --------------------------------------------------------  IN: 960 mL / OUT: 800 mL / NET: 160 mL    15 May 2024 07:01  -  16 May 2024 06:42  --------------------------------------------------------  IN: 1260 mL / OUT: 2300 mL / NET: -1040 mL        PHYSICAL EXAM:  GENERAL: NAD  HEAD:  Atraumatic, Normocephalic  EYES: EOMI, PERRL, conjunctiva and sclera clear  ENMT: Moist mucous membranes  NECK: Supple  CHEST/LUNG: Clear to auscultation bilaterally; No rales, rhonchi, wheezing, or rubs  HEART: Regular rate and rhythm, 4/6 systolic murmur  ABDOMEN: Soft, Nontender, Nondistended; Bowel sounds present  EXTREMITIES:  2+ Peripheral Pulses, 3+ b/l edema up to thighs, No clubbing, cyanosis  SKIN: generalized erythema w/ skin sloughing and desquamation, nonpruritic. Improving  NERVOUS SYSTEM:  Alert & Oriented X3, no focal deficits  PSYCH:  Appropriate affect    LABS:                        9.0    8.84  )-----------( 209      ( 15 May 2024 07:08 )             30.0                         8.7    6.97  )-----------( 189      ( 14 May 2024 07:12 )             28.8                         9.4    7.02  )-----------( 194      ( 13 May 2024 06:53 )             30.5     05-15    134<L>  |  95<L>  |  72<H>  ----------------------------<  97  3.6   |  25  |  2.86<H>  05-14    135  |  94<L>  |  76<H>  ----------------------------<  84  4.1   |  26  |  3.00<H>  05-14    133<L>  |  93<L>  |  76<H>  ----------------------------<  286<H>  4.3   |  27  |  3.10<H>    Ca    8.5      15 May 2024 07:09  Ca    9.2      14 May 2024 22:42  Ca    9.0      14 May 2024 14:19  Phos  3.9     05-15  Mg     2.3     05-15    TPro  6.0  /  Alb  2.9<L>  /  TBili  0.4  /  DBili  x   /  AST  18  /  ALT  11  /  AlkPhos  76  05-15  TPro  6.2  /  Alb  3.0<L>  /  TBili  0.6  /  DBili  x   /  AST  16  /  ALT  10  /  AlkPhos  84  05-14    CAPILLARY BLOOD GLUCOSE      POCT Blood Glucose.: 203 mg/dL (16 May 2024 02:15)  POCT Blood Glucose.: 174 mg/dL (15 May 2024 23:16)  POCT Blood Glucose.: 153 mg/dL (15 May 2024 21:25)  POCT Blood Glucose.: 165 mg/dL (15 May 2024 17:23)  POCT Blood Glucose.: 139 mg/dL (15 May 2024 12:57)  POCT Blood Glucose.: 137 mg/dL (15 May 2024 08:32)  POCT Blood Glucose.: 125 mg/dL (15 May 2024 07:07)        Urinalysis Basic - ( 15 May 2024 07:09 )    Color: x / Appearance: x / SG: x / pH: x  Gluc: 97 mg/dL / Ketone: x  / Bili: x / Urobili: x   Blood: x / Protein: x / Nitrite: x   Leuk Esterase: x / RBC: x / WBC x   Sq Epi: x / Non Sq Epi: x / Bacteria: x          RADIOLOGY & ADDITIONAL TESTS:    Imaging Personally Reviewed:  [x ] YES  [ ] NO    Consultants involved in case:   Consultant(s) Notes Reviewed:  [ x] YES  [ ] NO:   Care Discussed with Consultants/Other Providers [x ] YES  [ ] NO         ***************************************************************  Jairo Kaufman, PG1  Internal Medicine   Pager:  TEAMS  ***************************************************************    EVELYN LOPEZ  73y  MRN: [id number]    Patient is a 73y old  Female who presents with a chief complaint of Syncope (15 May 2024 21:12)      Interval/Overnight Events: Orthostatic again yesterday.    Subjective: Pt seen and examined at bedside.   Feels well.  Tolerating diet.      MEDICATIONS  (STANDING):  ammonium lactate 12% Lotion 1 Application(s) Topical <User Schedule>  aspirin  chewable 81 milliGRAM(s) Oral daily  atorvastatin 80 milliGRAM(s) Oral at bedtime  calamine/zinc oxide Lotion 1 Application(s) Topical three times a day  dextrose 10% Bolus 125 milliLiter(s) IV Bolus once  dextrose 5%. 1000 milliLiter(s) (100 mL/Hr) IV Continuous <Continuous>  dextrose 5%. 1000 milliLiter(s) (50 mL/Hr) IV Continuous <Continuous>  dextrose 50% Injectable 12.5 Gram(s) IV Push once  dextrose 50% Injectable 25 Gram(s) IV Push once  epoetin mendoza-epbx (RETACRIT) Injectable 24933 Unit(s) SubCutaneous every 7 days  ferrous    sulfate 325 milliGRAM(s) Oral two times a day  glucagon  Injectable 1 milliGRAM(s) IntraMuscular once  heparin   Injectable 5000 Unit(s) SubCutaneous every 8 hours  insulin glargine Injectable (LANTUS) 12 Unit(s) SubCutaneous at bedtime  insulin lispro (ADMELOG) corrective regimen sliding scale   SubCutaneous three times a day before meals  insulin lispro (ADMELOG) corrective regimen sliding scale   SubCutaneous <User Schedule>  insulin lispro Injectable (ADMELOG) 8 Unit(s) SubCutaneous three times a day before meals  lactated ringers. 500 milliLiter(s) (100 mL/Hr) IV Continuous <Continuous>  levothyroxine 75 MICROGram(s) Oral daily  midodrine 10 milliGRAM(s) Oral three times a day  Nephro-molly 1 Tablet(s) Oral daily  senna 2 Tablet(s) Oral at bedtime    MEDICATIONS  (PRN):  dextrose Oral Gel 15 Gram(s) Oral once PRN Blood Glucose LESS THAN 70 milliGRAM(s)/deciliter  polyethylene glycol 3350 17 Gram(s) Oral two times a day PRN Constipation      Objective:    Vitals: Vital Signs Last 24 Hrs  T(C): 36.9 (05-16-24 @ 05:16), Max: 36.9 (05-16-24 @ 05:16)  T(F): 98.4 (05-16-24 @ 05:16), Max: 98.4 (05-16-24 @ 05:16)  HR: 75 (05-16-24 @ 05:16) (66 - 86)  BP: 138/67 (05-16-24 @ 05:16) (119/64 - 138/67)  BP(mean): --  RR: 18 (05-16-24 @ 05:16) (18 - 18)  SpO2: 97% (05-16-24 @ 05:16) (95% - 99%)                I&O's Summary    14 May 2024 07:01  -  15 May 2024 07:00  --------------------------------------------------------  IN: 960 mL / OUT: 800 mL / NET: 160 mL    15 May 2024 07:01  -  16 May 2024 06:42  --------------------------------------------------------  IN: 1260 mL / OUT: 2300 mL / NET: -1040 mL        PHYSICAL EXAM:  GENERAL: NAD  HEAD:  Atraumatic, Normocephalic  EYES: EOMI, PERRL, conjunctiva and sclera clear  ENMT: Moist mucous membranes  NECK: Supple  CHEST/LUNG: Clear to auscultation bilaterally; No rales, rhonchi, wheezing, or rubs  HEART: Regular rate and rhythm, 4/6 systolic murmur  ABDOMEN: Soft, Nontender, Nondistended; Bowel sounds present  EXTREMITIES:  2+ Peripheral Pulses, 3+ b/l edema up to thighs, No clubbing, cyanosis.  Left arm swelling > right  SKIN: generalized erythema w/ skin sloughing and desquamation, nonpruritic. Improving  NERVOUS SYSTEM:  Alert & Oriented X3, no focal deficits  PSYCH:  Appropriate affect    LABS:                        9.0    8.84  )-----------( 209      ( 15 May 2024 07:08 )             30.0                         8.7    6.97  )-----------( 189      ( 14 May 2024 07:12 )             28.8                         9.4    7.02  )-----------( 194      ( 13 May 2024 06:53 )             30.5     05-15    134<L>  |  95<L>  |  72<H>  ----------------------------<  97  3.6   |  25  |  2.86<H>  05-14    135  |  94<L>  |  76<H>  ----------------------------<  84  4.1   |  26  |  3.00<H>  05-14    133<L>  |  93<L>  |  76<H>  ----------------------------<  286<H>  4.3   |  27  |  3.10<H>    Ca    8.5      15 May 2024 07:09  Ca    9.2      14 May 2024 22:42  Ca    9.0      14 May 2024 14:19  Phos  3.9     05-15  Mg     2.3     05-15    TPro  6.0  /  Alb  2.9<L>  /  TBili  0.4  /  DBili  x   /  AST  18  /  ALT  11  /  AlkPhos  76  05-15  TPro  6.2  /  Alb  3.0<L>  /  TBili  0.6  /  DBili  x   /  AST  16  /  ALT  10  /  AlkPhos  84  05-14    CAPILLARY BLOOD GLUCOSE      POCT Blood Glucose.: 203 mg/dL (16 May 2024 02:15)  POCT Blood Glucose.: 174 mg/dL (15 May 2024 23:16)  POCT Blood Glucose.: 153 mg/dL (15 May 2024 21:25)  POCT Blood Glucose.: 165 mg/dL (15 May 2024 17:23)  POCT Blood Glucose.: 139 mg/dL (15 May 2024 12:57)  POCT Blood Glucose.: 137 mg/dL (15 May 2024 08:32)  POCT Blood Glucose.: 125 mg/dL (15 May 2024 07:07)        Urinalysis Basic - ( 15 May 2024 07:09 )    Color: x / Appearance: x / SG: x / pH: x  Gluc: 97 mg/dL / Ketone: x  / Bili: x / Urobili: x   Blood: x / Protein: x / Nitrite: x   Leuk Esterase: x / RBC: x / WBC x   Sq Epi: x / Non Sq Epi: x / Bacteria: x          RADIOLOGY & ADDITIONAL TESTS:    Imaging Personally Reviewed:  [x ] YES  [ ] NO    Consultants involved in case:   Consultant(s) Notes Reviewed:  [ x] YES  [ ] NO:   Care Discussed with Consultants/Other Providers [x ] YES  [ ] NO

## 2024-05-16 NOTE — PROGRESS NOTE ADULT - PROBLEM SELECTOR PLAN 1
Home regimen: lantus 33u qhs w/ premeal sliding scale. A1c 7.4% in 3/2024. multiple hypoglycemic episodes, DKA now resolved, anion gap closed.  Close discussions with endocrine ongoing regarding management.    - endo following, appreciate recs   - Diet resumed  - Holding AM Lantus  - Lantus 10U qHS  - Dosing premeal insulin per endocrine recs  - MDSSI and LDSSI at night  - premeal and bedtime fingersticks  - hypoglycemia protocol.

## 2024-05-16 NOTE — PROGRESS NOTE ADULT - PROBLEM SELECTOR PLAN 2
Likely in setting of deconditioning.  Improving with fluids  - leg compression, abdominal binder   - PT rec HONEY   - OOB as tolerated   - midodrine 10mg TID.  - May be volume down, getting fluids for DKA Likely in setting of deconditioning.  Improving with fluids.  Will attempt orthostatics daily, if still not improved will increase midodrine  - leg compression, abdominal binder   - PT rec HONEY   - OOB as tolerated   - midodrine 10mg TID.  - May be volume down, getting fluids for DKA

## 2024-05-16 NOTE — PROGRESS NOTE ADULT - PROBLEM SELECTOR PLAN 3
DUNCAN secondary to ATN from hypotension and contrast nephropathy. CKD stage 4. s/p CRRT and bumex drip in CICU.  Cr 3.08-> 3.57 -> 3.66 -> 3.48.  [place]za for urinary retention (placed 5/10).  Improving with fluids    - f/u nephro recs   - strict I's and O's  - renally dose meds, avoid nephrotoxic agents.

## 2024-05-16 NOTE — PROGRESS NOTE ADULT - PROBLEM SELECTOR PLAN 5
EF from 4/2024 (post-TAVR) w/ EF 25%, global LV hypokinesis, moderately dilated LV, grade 2 LV diastolic dysfunction, RA moderately dilated.      - cards following   - Will f/u with cardiology regarding resumption of GDMT  - continue midodrine 10mg TID   - Continue atorvastatin 80mg daily  - hold home torsemide 20mg qd  - hold home eplerenone 25mg qd  - hold home metoprolol succinate 100mg qd  - restart GDMT as tolerated  - f/u w/ EP outpatient for CRT.

## 2024-05-16 NOTE — PROGRESS NOTE ADULT - ASSESSMENT
Ms Alina Chowdhury is a 72 y/o F with PMHx HFrEF (EF 30%), AS, LBBB, HTN, DM1, CKD, hypothyroidism, chronic lymphedema, LELIA (3L home O2) who presented for syncope 2/2 severe AS, now s/p TAVR 4/24 c/b by VT and Vfib requiring shock and flash pulmonary edema requiring intubation. Course complicated by contrast induced allergic reaction (had CTA for TAVR eval) and contrast related renal failure (acute on chronic) requiring CRRT for fluid removal and pressors for vasoplegia and hypovolemia due to CRRT. Patient now extubated, off pressors, CRRT and downgraded to medicine floors.

## 2024-05-16 NOTE — PROGRESS NOTE ADULT - SUBJECTIVE AND OBJECTIVE BOX
[facility]    Howard Kimble, Cherise, Carlos, Alecia, Herman      [phone]    CHIEF COMPLAINT: Patient is a 73y old  Female who presents with a chief complaint of Syncope (16 May 2024 06:41)      Follow Up: vol ol, s/p tavr    Interim history: The patient reports no new symptoms.  Denies chest discomfort and shortness of breath.  No abdominal pain.  No new neurologic symptoms.      MEDICATIONS  (STANDING):  ammonium lactate 12% Lotion 1 Application(s) Topical <User Schedule>  aspirin  chewable 81 milliGRAM(s) Oral daily  atorvastatin 80 milliGRAM(s) Oral at bedtime  calamine/zinc oxide Lotion 1 Application(s) Topical three times a day  dextrose 10% Bolus 125 milliLiter(s) IV Bolus once  dextrose 5%. 1000 milliLiter(s) (100 mL/Hr) IV Continuous <Continuous>  dextrose 5%. 1000 milliLiter(s) (50 mL/Hr) IV Continuous <Continuous>  dextrose 50% Injectable 12.5 Gram(s) IV Push once  dextrose 50% Injectable 25 Gram(s) IV Push once  epoetin mendoza-epbx (RETACRIT) Injectable 74670 Unit(s) SubCutaneous every 7 days  ferrous    sulfate 325 milliGRAM(s) Oral two times a day  glucagon  Injectable 1 milliGRAM(s) IntraMuscular once  heparin   Injectable 5000 Unit(s) SubCutaneous every 8 hours  insulin glargine Injectable (LANTUS) 12 Unit(s) SubCutaneous at bedtime  insulin lispro (ADMELOG) corrective regimen sliding scale   SubCutaneous <User Schedule>  insulin lispro (ADMELOG) corrective regimen sliding scale   SubCutaneous three times a day before meals  insulin lispro Injectable (ADMELOG) 8 Unit(s) SubCutaneous three times a day before meals  lactated ringers. 500 milliLiter(s) (100 mL/Hr) IV Continuous <Continuous>  levothyroxine 75 MICROGram(s) Oral daily  midodrine 10 milliGRAM(s) Oral three times a day  Nephro-molly 1 Tablet(s) Oral daily  senna 2 Tablet(s) Oral at bedtime    MEDICATIONS  (PRN):  dextrose Oral Gel 15 Gram(s) Oral once PRN Blood Glucose LESS THAN 70 milliGRAM(s)/deciliter  polyethylene glycol 3350 17 Gram(s) Oral two times a day PRN Constipation      REVIEW OF SYSTEMS:  eye, ent, GI, , allergic, dermatologic, musculoskeletal and neurologic are negative except as described above    Vital Signs Last 24 Hrs  T(C): 36.9 (16 May 2024 05:16), Max: 36.9 (16 May 2024 05:16)  T(F): 98.4 (16 May 2024 05:16), Max: 98.4 (16 May 2024 05:16)  HR: 75 (16 May 2024 05:16) (66 - 86)  BP: 138/67 (16 May 2024 05:16) (119/64 - 138/67)  BP(mean): --  RR: 18 (16 May 2024 05:16) (18 - 18)  SpO2: 97% (16 May 2024 05:16) (95% - 99%)    Parameters below as of 16 May 2024 05:16  Patient On (Oxygen Delivery Method): room air        I&O's Summary    15 May 2024 07:01  -  16 May 2024 07:00  --------------------------------------------------------  IN: 1260 mL / OUT: 2300 mL / NET: -1040 mL        Telemetry past 24h:    PHYSICAL EXAM:    Constitutional: well-nourished, well-developed, NAD   HEENT:  MMM, sclerae anicteric, conjunctivae clear, no oral cyanosis.  Pulmonary: Non-labored, breath sounds are clear bilaterally, No wheezing, rales or rhonchi  Cardiovascular: Regular, S1 and S2.  No murmur.  No rubs, gallops or clicks  Gastrointestinal: Bowel Sounds present, soft, nontender.   Lymph: mild peripheral edema. legs wrapped  Neurological: Alert, no focal deficits  Skin: flaking skin diffusely  Psych:  Mood & affect appropriate    LABS: All Labs Reviewed:                        9.4    7.25  )-----------( 211      ( 16 May 2024 06:46 )             31.1                         9.0    8.84  )-----------( 209      ( 15 May 2024 07:08 )             30.0                         8.7    6.97  )-----------( 189      ( 14 May 2024 07:12 )             28.8     16 May 2024 06:46    133    |  96     |  69     ----------------------------<  260    4.3     |  26     |  2.53   15 May 2024 07:09    134    |  95     |  72     ----------------------------<  97     3.6     |  25     |  2.86   14 May 2024 22:42    135    |  94     |  76     ----------------------------<  84     4.1     |  26     |  3.00     Ca    8.7        16 May 2024 06:46  Ca    8.5        15 May 2024 07:09  Ca    9.2        14 May 2024 22:42  Phos  3.7       16 May 2024 06:46  Phos  3.9       15 May 2024 07:09  Phos  4.9       14 May 2024 07:14  Mg     2.2       16 May 2024 06:46  Mg     2.3       15 May 2024 07:09  Mg     2.3       14 May 2024 07:14    TPro  6.4    /  Alb  2.9    /  TBili  0.5    /  DBili  x      /  AST  20     /  ALT  9      /  AlkPhos  80     16 May 2024 06:46  TPro  6.0    /  Alb  2.9    /  TBili  0.4    /  DBili  x      /  AST  18     /  ALT  11     /  AlkPhos  76     15 May 2024 07:09  TPro  6.2    /  Alb  3.0    /  TBili  0.6    /  DBili  x      /  AST  16     /  ALT  10     /  AlkPhos  84     14 May 2024 07:14          Blood Culture:        RADIOLOGY:    EKG:    Echo:

## 2024-05-16 NOTE — PROGRESS NOTE ADULT - ATTENDING COMMENTS
73F PMH: HFrEF (EF 30%), AS, LBBB, HTN, DM1, CKD, hypothyroidism, chronic lymphedema, LELIA (3L home O2) p/w for syncope 2/2 severe AS, s/p TAVR 4/24. Course complicated by contrast induced allergic reaction (had CTA for TAVR eval) and contrast related renal failure (acute on chronic) requiring HD. TAVR c/b VT and vfib requiring shock and flash pulmonary edema causing AHRF requiring intubation. Course as documented.     DUNCAN, hyperglycemia improving.     Persistently orthostatic despite fluids. Increase Midodrine, monitor.

## 2024-05-16 NOTE — PROGRESS NOTE ADULT - PROBLEM SELECTOR PLAN 3
Incidentally found on CT c/a/p w/wo IV contrast done for TAVR evaluation. The left adrenal gland is thickened and a 1.2 x 0.8 cm left adrenal nodule is seen. The right adrenal gland is normal.   Primary team discussed with radiology. HU of the adrenal nodule not able to be accurately determined due to motion artifact, also given imaging was taken at venous phase.    Patient had another CT scan which showed normal adrenals on 5/3/24  Test for excess adrenal hormones:   - Dex suppression test: AM cortisol 9.4 not suppressed with sufficiently high dexamethasone 374 (4/18/24). Repeat test AM cortisol 4.1 not suppressed with ACTH 9.5, dex level 449 (4/20/24). Concerning for possible Cushings, likely primary adrenal source given ACTH not elevated, however, patient is also in ICU on pressors and in a stressed state. Urine cortisol low at 1.2mcg/24 hour but only 200ml for 24 hours. Salivary cortisol cancelled for QNS, repeat salivary cortisol specimen received   - plasma metanephrines 46.8 normal range    - serum aldosterone is elevated to 37.4. Plasma renin activity 9.775. Ratio = 3.82, not elevated, no hyperaldosteronism.    - DHEAS 139 wnl. Androstenedione <10.    PLAN  - Patient with 2 positive DST - concerning for cushing syndrome. No indication for treatment at this time, would recommend repeating testing outpatient after patient resolved from acute critical illness - will give signout to patient's endocrinologist Dr. Luis Dumont prior to discharge   - Follow up salivary cortisol as out pt as per Dr Burton.    - May need to repeat 24 hour urine cortisol once renal function improved and no longer on CRRT   -Renal function improving but remains with creat in 3s. No need to test at this time. If team wishes can send salivary cortisol and 24 hour urine cortisol prior to discharge but per Dr uBrton, test results won't be available  until 1-2 weeks.   - Will need follow up outpatient with Dr. Luis Dumont

## 2024-05-16 NOTE — PROGRESS NOTE ADULT - NSPROGADDITIONALINFOA_GEN_ALL_CORE
DVT ppx: SQH   Diet: CC   Dispo: pending clinical course, PT rec HONEY DVT ppx: SQH   Diet: CC   Dispo: pending clinical course, PT rec HONEY      #Swelling of left upper arm  - DVT Duplex ultrasound Bilateral upper extremities

## 2024-05-16 NOTE — PROGRESS NOTE ADULT - ASSESSMENT
73F w HFrEF (EF 30%), mod AS, known LBBB, HTN, IDDM1, CKD, hypothyroidism, chronic lymphedema, p/w 1 episode of syncope with R arm jerking.     #Syncope-Aortic Stenosis  - Known Aortic Stenosis likely Severe in setting of decreased LVEF  - s/p tavr on 4/24 c/b VT -> shock -> VFib -> shock  - hypoxic/congested, and was intubated, now extubated on 4/25 in the evening, on NC   - on 4/25 with worsening bradycardia, and now s/p TVP placement followed by AV Micra placement with removal of TVP  - Remains in Sinus Rhythm/known lbbb with intermittent   - Vaso has been weaned off with the uptitration of midodrine  - On Midodrine 10mg Q8  - to consider CRT-D in the future  - Cannot initiate GDMT due to blood pressures  - would try to mobilize as best as possible and monitor orthostatics  - cont asa and statin    #Chronic Systolic HF (EF 30%), mod to severe AS, HTN, LBBB, Lymphedema   - Has known systolic HF and AS.    - Repeat TTE showed EF 30%, mild-mod LVH, mildly reduced RVF, mod-severe AS (.61 cmsq), mod pHTN  - On home Torsemide 20 mg daily, Eplerenone 25 mg daily, and Toprol  mg daily, all currently on hold  - Orthostatic, diuretics on hold  - On Midodrine 10mg TID for orthostatic hypotension. has gotten ivf as well   - initially CVVHD, then HD  - HD now on hold, as she is urinating and creatinine steady.  - prn iv bumex  - renal fu    - overall improving. needs PT  - will follow with you

## 2024-05-16 NOTE — PROGRESS NOTE ADULT - SUBJECTIVE AND OBJECTIVE BOX
HPI:     Patient is a 73y old  Female who presents with a chief complaint of Syncope (16 May 2024 09:28)  Endocrinology consulted for diabetes management.   INTERVAL HPI/OVERNIGHT EVENTS:  No acute event overnight.   Currently denies polydipsia, polyuria , visual changes, numbness in feet.    atorvastatin   80 milliGRAM(s) Oral (05-15-24 @ 21:01)   80 milliGRAM(s) Oral (05-14-24 @ 22:26)    insulin glargine Injectable (LANTUS)   10 Unit(s) SubCutaneous (05-14-24 @ 22:29)    insulin glargine Injectable (LANTUS)   12 Unit(s) SubCutaneous (05-15-24 @ 23:17)    insulin lispro (ADMELOG) corrective regimen sliding scale   1 Unit(s) SubCutaneous (05-16-24 @ 13:27)   3 Unit(s) SubCutaneous (05-16-24 @ 09:00)   1 Unit(s) SubCutaneous (05-15-24 @ 17:49)    insulin lispro (ADMELOG) corrective regimen sliding scale   2 Unit(s) SubCutaneous (05-14-24 @ 18:51)    insulin lispro Injectable (ADMELOG)   8 Unit(s) SubCutaneous (05-16-24 @ 13:27)   8 Unit(s) SubCutaneous (05-16-24 @ 09:00)   8 Unit(s) SubCutaneous (05-15-24 @ 17:49)   8 Unit(s) SubCutaneous (05-15-24 @ 13:44)   8 Unit(s) SubCutaneous (05-15-24 @ 09:18)   8 Unit(s) SubCutaneous (05-14-24 @ 18:50)    levothyroxine   75 MICROGram(s) Oral (05-16-24 @ 05:17)   75 MICROGram(s) Oral (05-15-24 @ 06:06)      Review of systems:   CONSTITUTIONAL:  Feels well, good appetite  CARDIOVASCULAR:  Negative for chest pain or palpitations  RESPIRATORY:  Negative for cough, or SOB   GASTROINTESTINAL:  Negative for nausea, vomiting, or abdominal pain  GENITOURINARY:  Negative frequency, urgency or dysuria     CAPILLARY BLOOD GLUCOSE  POCT Blood Glucose.: 193 mg/dL (16 May 2024 12:31)  POCT Blood Glucose.: 260 mg/dL (16 May 2024 08:19)  POCT Blood Glucose.: 203 mg/dL (16 May 2024 02:15)  POCT Blood Glucose.: 174 mg/dL (15 May 2024 23:16)  POCT Blood Glucose.: 153 mg/dL (15 May 2024 21:25)  POCT Blood Glucose.: 165 mg/dL (15 May 2024 17:23)       MEDICATIONS  (STANDING):  ammonium lactate 12% Lotion 1 Application(s) Topical <User Schedule>  aspirin  chewable 81 milliGRAM(s) Oral daily  atorvastatin 80 milliGRAM(s) Oral at bedtime  calamine/zinc oxide Lotion 1 Application(s) Topical three times a day  dextrose 10% Bolus 125 milliLiter(s) IV Bolus once  dextrose 5%. 1000 milliLiter(s) (50 mL/Hr) IV Continuous <Continuous>  dextrose 5%. 1000 milliLiter(s) (100 mL/Hr) IV Continuous <Continuous>  dextrose 50% Injectable 25 Gram(s) IV Push once  dextrose 50% Injectable 12.5 Gram(s) IV Push once  epoetin mendoza-epbx (RETACRIT) Injectable 61942 Unit(s) SubCutaneous every 7 days  ferrous    sulfate 325 milliGRAM(s) Oral two times a day  glucagon  Injectable 1 milliGRAM(s) IntraMuscular once  heparin   Injectable 5000 Unit(s) SubCutaneous every 8 hours  insulin glargine Injectable (LANTUS) 12 Unit(s) SubCutaneous at bedtime  insulin lispro (ADMELOG) corrective regimen sliding scale   SubCutaneous three times a day before meals  insulin lispro (ADMELOG) corrective regimen sliding scale   SubCutaneous <User Schedule>  insulin lispro Injectable (ADMELOG) 8 Unit(s) SubCutaneous three times a day before meals  levothyroxine 75 MICROGram(s) Oral daily  midodrine. 15 milliGRAM(s) Oral three times a day  Nephro-molly 1 Tablet(s) Oral daily  senna 2 Tablet(s) Oral at bedtime    MEDICATIONS  (PRN):  dextrose Oral Gel 15 Gram(s) Oral once PRN Blood Glucose LESS THAN 70 milliGRAM(s)/deciliter  polyethylene glycol 3350 17 Gram(s) Oral two times a day PRN Constipation      PHYSICAL EXAM  Vital Signs Last 24 Hrs  T(C): 36.4 (16 May 2024 11:26), Max: 36.9 (16 May 2024 05:16)  T(F): 97.6 (16 May 2024 11:26), Max: 98.4 (16 May 2024 05:16)  HR: 71 (16 May 2024 11:29) (70 - 86)  BP: 130/62 (16 May 2024 11:29) (130/62 - 138/67)  BP(mean): --  RR: 18 (16 May 2024 11:26) (18 - 18)  SpO2: 96% (16 May 2024 11:26) (95% - 99%)    Parameters below as of 16 May 2024 11:26  Patient On (Oxygen Delivery Method): room air        GENERAL: laying in bed in NAD  RESPIRATORY: nonlabored breathing, no accessory muscle use  Extremities: Warm, no edema in all 4 exts   NEURO: A&O X3    LABS:                        9.4    7.25  )-----------( 211      ( 16 May 2024 06:46 )             31.1     05-16    133<L>  |  96  |  69<H>  ----------------------------<  260<H>  4.3   |  26  |  2.53<H>    Ca    8.7      16 May 2024 06:46  Phos  3.7     05-16  Mg     2.2     05-16    TPro  6.4  /  Alb  2.9<L>  /  TBili  0.5  /  DBili  x   /  AST  20  /  ALT  9<L>  /  AlkPhos  80  05-16      Urinalysis Basic - ( 16 May 2024 06:46 )    Color: x / Appearance: x / SG: x / pH: x  Gluc: 260 mg/dL / Ketone: x  / Bili: x / Urobili: x   Blood: x / Protein: x / Nitrite: x   Leuk Esterase: x / RBC: x / WBC x   Sq Epi: x / Non Sq Epi: x / Bacteria: x      Thyroid Stimulating Hormone, Serum: 5.06 uIU/mL (04-14 @ 07:18)  Thyroid Stimulating Hormone, Serum: 4.79 uIU/mL (04-13 @ 07:05)  Thyroid Stimulating Hormone, Serum: 3.14 uIU/mL (06-04 @ 08:38)

## 2024-05-16 NOTE — PROGRESS NOTE ADULT - PROBLEM SELECTOR PLAN 1
- Test BG ac and hs/2am  - Adjust Lantus to 14 units at hs for now. If BG at bedtime <100 please administer 10 units and provide snack. Will re-assess daily   - C/w Admelog dose  8 units TIDAC for now> will adjust as needed  - C/w Low dose admelog correction scale TIDAC, Low dose admelog correction scale QHS and 2am in case of rebound hypo/hyperglycemia  -Will follow  Discharge:  Will be determined according to BG/insulin needs/PO intake  at time of discharge. Basal/bolus insulin doses TBD  - Of note, patient instructed on discharge from recent hospitalization at [facility] to start farxiga for CHF. Given risks of DKA of SGLT2i in T1DM, would not start until better data is available for its benefits.  - Follow up with Dr. Luis Dumont outpatient  -Pt will likely required rehab  F/u with optho/renal/cardiac as out pt

## 2024-05-17 NOTE — PROGRESS NOTE ADULT - ASSESSMENT
Ms Alina Chowdhuyr is a 72 y/o F with PMHx HFrEF (EF 30%), AS, LBBB, HTN, DM1, CKD, hypothyroidism, chronic lymphedema, LELIA (3L home O2) who presented for syncope 2/2 severe AS, now s/p TAVR 4/24 c/b by VT and Vfib requiring shock and flash pulmonary edema requiring intubation. Course complicated by contrast induced allergic reaction (had CTA for TAVR eval) and contrast related renal failure (acute on chronic) requiring CRRT for fluid removal and pressors for vasoplegia and hypovolemia due to CRRT. Patient now extubated, off pressors, CRRT and downgraded to medicine floors.

## 2024-05-17 NOTE — PROGRESS NOTE ADULT - PROBLEM SELECTOR PLAN 2
Patient has waxing and waning swelling of left upper extremity during hospitalization, per Catrina.  Noted on exam on 5/16.  DVT Duplex ultrasound Bilateral upper extremities and found to have left axial, brachial, and subclavian DVT on imaging 5/16.  -  Begun heparin gtt (5/16) Patient has waxing and waning swelling of left upper extremity during hospitalization, per Catrina.  Noted on exam on 5/16.  DVT Duplex ultrasound Bilateral upper extremities and found to have left axial, brachial, and subclavian DVT on imaging 5/16.  -  Begun heparin gtt (5/16), discontinued 5/17  - Begin heparin load 10mg BID x 7 days (5/17 - )

## 2024-05-17 NOTE — PROGRESS NOTE ADULT - SUBJECTIVE AND OBJECTIVE BOX
[facility]   [phone]  RENAL FOLLOW UP NOTE  --------------------------------------------------------------------------------  Chief Complaint:    24 hour events/subjective:    seen earlier   states has been dizzy still   denies sob     PAST HISTORY  --------------------------------------------------------------------------------  No significant changes to PMH, PSH, FHx, SHx, unless otherwise noted    ALLERGIES & MEDICATIONS  --------------------------------------------------------------------------------  Allergies    contrast media (iodine-based) (Fever; Vomiting; Flushing; Hypotension; Rash; Stomach Upset)  Ativan (Rash; Urticaria)  penicillins (Hives (Mod to Severe); Anaphylaxis (Mod to Severe); Short breath (Mod to Severe); Angioedema (Mod to Severe))    Intolerances      Standing Inpatient Medications  ammonium lactate 12% Lotion 1 Application(s) Topical <User Schedule>  apixaban 10 milliGRAM(s) Oral two times a day  aspirin  chewable 81 milliGRAM(s) Oral daily  atorvastatin 80 milliGRAM(s) Oral at bedtime  calamine/zinc oxide Lotion 1 Application(s) Topical three times a day  dextrose 10% Bolus 125 milliLiter(s) IV Bolus once  dextrose 5%. 1000 milliLiter(s) IV Continuous <Continuous>  dextrose 5%. 1000 milliLiter(s) IV Continuous <Continuous>  dextrose 50% Injectable 12.5 Gram(s) IV Push once  dextrose 50% Injectable 25 Gram(s) IV Push once  epoetin mendoza-epbx (RETACRIT) Injectable 04623 Unit(s) SubCutaneous every 7 days  ferrous    sulfate 325 milliGRAM(s) Oral two times a day  glucagon  Injectable 1 milliGRAM(s) IntraMuscular once  insulin glargine Injectable (LANTUS) 20 Unit(s) SubCutaneous at bedtime  insulin lispro (ADMELOG) corrective regimen sliding scale   SubCutaneous three times a day before meals  insulin lispro (ADMELOG) corrective regimen sliding scale   SubCutaneous <User Schedule>  insulin lispro Injectable (ADMELOG) 10 Unit(s) SubCutaneous three times a day before meals  lactated ringers. 1000 milliLiter(s) IV Continuous <Continuous>  levothyroxine 75 MICROGram(s) Oral daily  midodrine. 15 milliGRAM(s) Oral three times a day  Nephro-molly 1 Tablet(s) Oral daily  senna 2 Tablet(s) Oral at bedtime    PRN Inpatient Medications  dextrose Oral Gel 15 Gram(s) Oral once PRN  polyethylene glycol 3350 17 Gram(s) Oral two times a day PRN      REVIEW OF SYSTEMS  --------------------------------------------------------------------------------    Gen: denies  fevers/chills,  CVS: denies chest pain/palpitations  Resp: denies SOB/Cough  GI: Denies N/V/Abd pain  : Denies dysuria    VITALS/PHYSICAL EXAM  --------------------------------------------------------------------------------  T(C): 36.3 (05-17-24 @ 20:07), Max: 36.6 (05-17-24 @ 05:09)  HR: 81 (05-17-24 @ 20:07) (71 - 81)  BP: 153/69 (05-17-24 @ 20:07) (104/56 - 153/69)  RR: 18 (05-17-24 @ 20:07) (18 - 19)  SpO2: 98% (05-17-24 @ 20:07) (92% - 98%)  Wt(kg): --    Weight (kg): 125.8 (05-16-24 @ 20:51)      05-16-24 @ 07:01  -  05-17-24 @ 07:00  --------------------------------------------------------  IN: 1060 mL / OUT: 2000 mL / NET: -940 mL    05-17-24 @ 07:01  -  05-17-24 @ 22:24  --------------------------------------------------------  IN: 240 mL / OUT: 850 mL / NET: -610 mL      Physical Exam:  	         Gen: comfortable appearing   	Pulm: decrease bs  no rales or ronchi or wheezing  	CV: No JVD. RRR, S1S2; no rub II/VI M   	Abd: +BS, soft, nontender/nondistended, obese   	UE: Warm, no cyanosis  no clubbing, no edema  	LE: Warm, no cyanosis  no clubbing, 1++ edema,  	Neuro: alert and oriented     LABS/STUDIES  --------------------------------------------------------------------------------              9.9    8.56  >-----------<  225      [05-17-24 @ 04:49]              34.0     136  |  97  |  65  ----------------------------<  268      [05-17-24 @ 04:52]  4.1   |  24  |  2.37        Ca     8.6     [05-17-24 @ 04:52]      Mg     2.2     [05-17-24 @ 04:52]      Phos  3.2     [05-17-24 @ 04:52]    TPro  6.4  /  Alb  2.9  /  TBili  0.4  /  DBili  x   /  AST  22  /  ALT  10  /  AlkPhos  86  [05-17-24 @ 04:52]    PT/INR: PT 10.1 , INR 0.96       [05-16-24 @ 19:10]  PTT: 192.7      [05-17-24 @ 04:52]      Creatinine Trend:  SCr 2.37 [05-17 @ 04:52]  SCr 2.53 [05-16 @ 06:46]  SCr 2.86 [05-15 @ 07:09]  SCr 3.00 [05-14 @ 22:42]  SCr 3.10 [05-14 @ 14:19]      PTH -- (Ca 8.4)      [04-19-24 @ 17:54]   109  TSH 5.06      [04-14-24 @ 07:18]  Lipid: chol 74, TG 70, HDL 33, LDL --      [04-13-24 @ 07:05]

## 2024-05-17 NOTE — PROGRESS NOTE ADULT - NUTRITIONAL ASSESSMENT
Diet, Consistent Carbohydrate w/Evening Snack (05-14-24 @ 17:35)    Please see RD assessment and/or follow up.  Managed by primary team as well

## 2024-05-17 NOTE — PROGRESS NOTE ADULT - NSPROGADDITIONALINFOA_GEN_ALL_CORE
DVT ppx: SQH   Diet: CC   Dispo: pending clinical course, PT rec [facility]    #Adrenal Nodule  Incidental finding on CT C/A/P w/ thickened L adrenal gland and 1.2 x 0.8 cm L adrenal nodule.  Two positive DST, concerning for cushing syndrome. Endo following.  - no indication for txt at this time  - f/u outpatient w/ Dr. Maynor Dumont for repeat testing. DVT ppx: heparin gtt  Diet: CC   Dispo: pending clinical course, PT rec [facility]    #Adrenal Nodule  Incidental finding on CT C/A/P w/ thickened L adrenal gland and 1.2 x 0.8 cm L adrenal nodule.  Two positive DST, concerning for cushing syndrome. Endo following.  - no indication for txt at this time  - f/u outpatient w/ Dr. Maynor Dumont for repeat testing. DVT ppx: Eliquis  Diet: CC   Dispo: pending clinical course, PT rec [facility]    #Adrenal Nodule  Incidental finding on CT C/A/P w/ thickened L adrenal gland and 1.2 x 0.8 cm L adrenal nodule.  Two positive DST, concerning for cushing syndrome. Endo following.  - no indication for txt at this time  - f/u outpatient w/ Dr. Maynor Dumont for repeat testing.

## 2024-05-17 NOTE — PROGRESS NOTE ADULT - ATTENDING COMMENTS
73F PMH: HFrEF (EF 30%), AS, LBBB, HTN, DM1, CKD, hypothyroidism, chronic lymphedema, LELIA (3L home O2) p/w for syncope 2/2 severe AS, s/p TAVR 4/24. Course complicated by contrast induced allergic reaction (had CTA for TAVR eval) and contrast related renal failure (acute on chronic) requiring HD. TAVR c/b VT and vfib requiring shock and flash pulmonary edema causing AHRF requiring intubation. Course as documented.     Course complicated by contrast induced allergic reaction (had CTA for TAVR eval) and contrast related renal failure (acute on chronic) requiring CRRT for fluid removal and pressors for vasoplegia and hypovolemia due to CRRT. Patient extubated, now off pressors, CRRT.     DUNCAN, hyperglycemia improving.     Persistently orthostatic despite fluids and increased Midodrine. Spoke w Nephrology Dr Merino- to give another litre of fluids today and reassess. 73F PMH: HFrEF (EF 30%), AS, LBBB, HTN, DM1, CKD, hypothyroidism, chronic lymphedema, LELIA (3L home O2) p/w for syncope 2/2 severe AS, s/p TAVR 4/24. Course complicated by contrast induced allergic reaction (had CTA for TAVR eval) and contrast related renal failure (acute on chronic) requiring HD. TAVR c/b VT and vfib requiring shock and flash pulmonary edema causing AHRF requiring intubation.     Course complicated by contrast induced allergic reaction (had CTA for TAVR eval) and contrast related renal failure (acute on chronic) requiring CRRT for fluid removal and pressors for vasoplegia and hypovolemia due to CRRT. Patient extubated, now off pressors, CRRT.     DUNCAN, hyperglycemia improving.     Persistently orthostatic despite fluids and increased Midodrine. Spoke w Nephrology Dr Merino- to give another litre of fluids today and reassess.

## 2024-05-17 NOTE — PROGRESS NOTE ADULT - NSPROGADDITIONALINFOA_GEN_ALL_CORE
-Plan discussed with pt/team.  Contact info: [phone] (24/7). pager [phone]  Amion on [place]  Teams on M-T-W-F. Unavailable Thu/Weekends/Holidays  Assessed pt/labs/meds and discussed plan of care with primary team  Adjusting insulin  Discharge plan  Follow up care

## 2024-05-17 NOTE — PROGRESS NOTE ADULT - SUBJECTIVE AND OBJECTIVE BOX
DIABETES FOLLOW UP NOTE: Saw pt earlier today    Chief Complaint: Endocrine consult requested for management of DM    INTERVAL HX: Pt stable, feeling better and tolerating POs without N/V/abdominal pain reported. BG levels improved during the day but remains with overnight fasting hyperglycemia.  Pt denies eating any food at night.  No hypoglycemia and creat improving every day.       Review of Systems:  General: As above  Cardiovascular: No chest pain, palpitations  Respiratory: No SOB, no cough  GI: No nausea, vomiting, abdominal pain  Endocrine: No polyuria, polydipsia or S&Sx of hypoglycemia    Allergies    contrast media (iodine-based) (Fever; Vomiting; Flushing; Hypotension; Rash; Stomach Upset)  Ativan (Rash; Urticaria)  penicillins (Hives (Mod to Severe); Anaphylaxis (Mod to Severe); Short breath (Mod to Severe); Angioedema (Mod to Severe))    Intolerances      MEDICATIONS:  atorvastatin 80 milliGRAM(s) Oral at bedtime  insulin glargine Injectable (LANTUS) 16 Unit(s) SubCutaneous at bedtime  insulin lispro (ADMELOG) corrective regimen sliding scale   SubCutaneous three times a day before meals  insulin lispro (ADMELOG) corrective regimen sliding scale   SubCutaneous <User Schedule>  insulin lispro Injectable (ADMELOG) 8 Unit(s) SubCutaneous three times a day before meals  levothyroxine 75 MICROGram(s) Oral daily        PHYSICAL EXAM:  VITALS: T(C): 36.6 (05-17-24 @ 11:58)  T(F): 97.9 (05-17-24 @ 11:58), Max: 99.5 (05-16-24 @ 20:51)  HR: 71 (05-17-24 @ 11:58) (70 - 76)  BP: 104/56 (05-17-24 @ 11:58) (104/56 - 136/65)  RR:  (18 - 19)  SpO2:  (92% - 97%)  Wt(kg): --  GENERAL: Female laying in bed in NAD.   Abdomen: Soft, nontender, non distended. + obesity  Extremities: Warm,+ edema in LE with dressings on D&I.  NEURO: A&O X3    LABS:  POCT Blood Glucose.: 191 mg/dL (05-17-24 @ 12:58)  POCT Blood Glucose.: 242 mg/dL (05-17-24 @ 08:44)  POCT Blood Glucose.: 308 mg/dL (05-17-24 @ 02:12)  POCT Blood Glucose.: 180 mg/dL (05-16-24 @ 21:46)  POCT Blood Glucose.: 178 mg/dL (05-16-24 @ 17:11)  POCT Blood Glucose.: 193 mg/dL (05-16-24 @ 12:31)  POCT Blood Glucose.: 260 mg/dL (05-16-24 @ 08:19)  POCT Blood Glucose.: 203 mg/dL (05-16-24 @ 02:15)  POCT Blood Glucose.: 174 mg/dL (05-15-24 @ 23:16)  POCT Blood Glucose.: 153 mg/dL (05-15-24 @ 21:25)  POCT Blood Glucose.: 165 mg/dL (05-15-24 @ 17:23)  POCT Blood Glucose.: 139 mg/dL (05-15-24 @ 12:57)  POCT Blood Glucose.: 137 mg/dL (05-15-24 @ 08:32)  POCT Blood Glucose.: 125 mg/dL (05-15-24 @ 07:07)  POCT Blood Glucose.: 101 mg/dL (05-15-24 @ 01:42)  POCT Blood Glucose.: 101 mg/dL (05-14-24 @ 21:58)  POCT Blood Glucose.: 174 mg/dL (05-14-24 @ 18:40)  POCT Blood Glucose.: 191 mg/dL (05-14-24 @ 17:22)  POCT Blood Glucose.: 302 mg/dL (05-14-24 @ 14:08)                            9.9    8.56  )-----------( 225      ( 17 May 2024 04:49 )             34.0       05-17    136  |  97  |  65<H>  ----------------------------<  268<H>  4.1   |  24  |  2.37<H>    eGFR: 21<L>    Ca    8.6      05-17  Mg     2.2     05-17  Phos  3.2     05-17    TPro  6.4  /  Alb  2.9<L>  /  TBili  0.4  /  DBili  x   /  AST  22  /  ALT  10  /  AlkPhos  86  05-17    A1C with Estimated Average Glucose Result: 7.4 % (03-30-24 @ 08:21)      Estimated Average Glucose: 166 mg/dL (03-30-24 @ 08:21)        04-13 Chol 74 Direct LDL -- LDL calculated 25 HDL 33<L> Trig 70

## 2024-05-17 NOTE — PROGRESS NOTE ADULT - SUBJECTIVE AND OBJECTIVE BOX
***************************************************************  Jairo Kaufman, PG1  Internal Medicine   Pager:  TEAMS  ***************************************************************    EVELYN LOPEZ  73y  MRN: [id number]    Patient is a 73y old  Female who presents with a chief complaint of Syncope (16 May 2024 15:52)      Interval/Overnight Events: Found to have left axial, brachial, and subclavian DVT on imaging 5/16    Subjective: Pt seen and examined at bedside. Denies fever, CP, SOB, abn pain, N/V, dysuria. Tolerating diet.      MEDICATIONS  (STANDING):  ammonium lactate 12% Lotion 1 Application(s) Topical <User Schedule>  aspirin  chewable 81 milliGRAM(s) Oral daily  atorvastatin 80 milliGRAM(s) Oral at bedtime  calamine/zinc oxide Lotion 1 Application(s) Topical three times a day  dextrose 10% Bolus 125 milliLiter(s) IV Bolus once  dextrose 5%. 1000 milliLiter(s) (100 mL/Hr) IV Continuous <Continuous>  dextrose 5%. 1000 milliLiter(s) (50 mL/Hr) IV Continuous <Continuous>  dextrose 50% Injectable 25 Gram(s) IV Push once  dextrose 50% Injectable 12.5 Gram(s) IV Push once  epoetin mendoza-epbx (RETACRIT) Injectable 39490 Unit(s) SubCutaneous every 7 days  ferrous    sulfate 325 milliGRAM(s) Oral two times a day  glucagon  Injectable 1 milliGRAM(s) IntraMuscular once  heparin  Infusion. 2200 Unit(s)/Hr (22 mL/Hr) IV Continuous <Continuous>  insulin glargine Injectable (LANTUS) 16 Unit(s) SubCutaneous at bedtime  insulin lispro (ADMELOG) corrective regimen sliding scale   SubCutaneous <User Schedule>  insulin lispro (ADMELOG) corrective regimen sliding scale   SubCutaneous three times a day before meals  insulin lispro Injectable (ADMELOG) 8 Unit(s) SubCutaneous three times a day before meals  levothyroxine 75 MICROGram(s) Oral daily  midodrine. 15 milliGRAM(s) Oral three times a day  Nephro-molly 1 Tablet(s) Oral daily  senna 2 Tablet(s) Oral at bedtime    MEDICATIONS  (PRN):  dextrose Oral Gel 15 Gram(s) Oral once PRN Blood Glucose LESS THAN 70 milliGRAM(s)/deciliter  polyethylene glycol 3350 17 Gram(s) Oral two times a day PRN Constipation      Objective:    Vitals: Vital Signs Last 24 Hrs  T(C): 36.6 (05-17-24 @ 05:09), Max: 37.5 (05-16-24 @ 20:51)  T(F): 97.9 (05-17-24 @ 05:09), Max: 99.5 (05-16-24 @ 20:51)  HR: 71 (05-17-24 @ 05:09) (70 - 76)  BP: 122/68 (05-17-24 @ 05:09) (118/74 - 136/65)  BP(mean): --  RR: 18 (05-17-24 @ 05:09) (18 - 18)  SpO2: 92% (05-17-24 @ 05:09) (92% - 97%)                I&O's Summary    15 May 2024 07:01  -  16 May 2024 07:00  --------------------------------------------------------  IN: 1260 mL / OUT: 2300 mL / NET: -1040 mL    16 May 2024 07:01  -  17 May 2024 06:31  --------------------------------------------------------  IN: 1060 mL / OUT: 800 mL / NET: 260 mL        PHYSICAL EXAM:  GENERAL: NAD  HEAD:  Atraumatic, Normocephalic  EYES: EOMI, conjunctiva and sclera clear  CHEST/LUNG: Clear to auscultation bilaterally; No rales, rhonchi, wheezing, or rubs  HEART: Regular rate and rhythm; No murmurs, rubs, or gallops  ABDOMEN: Soft, Nontender, Nondistended;   SKIN: No rashes or lesions  NERVOUS SYSTEM:  Alert & Oriented X3, no focal deficits    LABS:                        9.9    8.56  )-----------( 225      ( 17 May 2024 04:49 )             34.0                         9.4    7.25  )-----------( 211      ( 16 May 2024 06:46 )             31.1                         9.0    8.84  )-----------( 209      ( 15 May 2024 07:08 )             30.0     05-17    136  |  97  |  65<H>  ----------------------------<  268<H>  4.1   |  24  |  2.37<H>  05-16    133<L>  |  96  |  69<H>  ----------------------------<  260<H>  4.3   |  26  |  2.53<H>  05-15    134<L>  |  95<L>  |  72<H>  ----------------------------<  97  3.6   |  25  |  2.86<H>    Ca    8.6      17 May 2024 04:52  Ca    8.7      16 May 2024 06:46  Ca    8.5      15 May 2024 07:09  Phos  3.2     05-17  Mg     2.2     05-17    TPro  6.4  /  Alb  2.9<L>  /  TBili  0.4  /  DBili  x   /  AST  22  /  ALT  10  /  AlkPhos  86  05-17  TPro  6.4  /  Alb  2.9<L>  /  TBili  0.5  /  DBili  x   /  AST  20  /  ALT  9<L>  /  AlkPhos  80  05-16  TPro  6.0  /  Alb  2.9<L>  /  TBili  0.4  /  DBili  x   /  AST  18  /  ALT  11  /  AlkPhos  76  05-15    CAPILLARY BLOOD GLUCOSE      POCT Blood Glucose.: 308 mg/dL (17 May 2024 02:12)  POCT Blood Glucose.: 180 mg/dL (16 May 2024 21:46)  POCT Blood Glucose.: 178 mg/dL (16 May 2024 17:11)  POCT Blood Glucose.: 193 mg/dL (16 May 2024 12:31)  POCT Blood Glucose.: 260 mg/dL (16 May 2024 08:19)    PT/INR - ( 16 May 2024 19:10 )   PT: 10.1 sec;   INR: 0.96 ratio         PTT - ( 17 May 2024 04:52 )  PTT:192.7 sec    Urinalysis Basic - ( 17 May 2024 04:52 )    Color: x / Appearance: x / SG: x / pH: x  Gluc: 268 mg/dL / Ketone: x  / Bili: x / Urobili: x   Blood: x / Protein: x / Nitrite: x   Leuk Esterase: x / RBC: x / WBC x   Sq Epi: x / Non Sq Epi: x / Bacteria: x          RADIOLOGY & ADDITIONAL TESTS:    Imaging Personally Reviewed:  [x ] YES  [ ] NO    Consultants involved in case:   Consultant(s) Notes Reviewed:  [ x] YES  [ ] NO:   Care Discussed with Consultants/Other Providers [x ] YES  [ ] NO         ***************************************************************  Jairo Kaufman, PG1  Internal Medicine   Pager:  TEAMS  ***************************************************************    EVELYN LOPEZ  73y  MRN: [id number]    Patient is a 73y old  Female who presents with a chief complaint of Syncope (16 May 2024 15:52)      Interval/Overnight Events: Found to have left axial, brachial, and subclavian DVT on imaging 5/16    Subjective: Pt seen and examined at bedside. Frustrated that she needs such frequent blood draws for her heparin gtt.  Feeling OK    MEDICATIONS  (STANDING):  ammonium lactate 12% Lotion 1 Application(s) Topical <User Schedule>  aspirin  chewable 81 milliGRAM(s) Oral daily  atorvastatin 80 milliGRAM(s) Oral at bedtime  calamine/zinc oxide Lotion 1 Application(s) Topical three times a day  dextrose 10% Bolus 125 milliLiter(s) IV Bolus once  dextrose 5%. 1000 milliLiter(s) (100 mL/Hr) IV Continuous <Continuous>  dextrose 5%. 1000 milliLiter(s) (50 mL/Hr) IV Continuous <Continuous>  dextrose 50% Injectable 25 Gram(s) IV Push once  dextrose 50% Injectable 12.5 Gram(s) IV Push once  epoetin mendoza-epbx (RETACRIT) Injectable 72106 Unit(s) SubCutaneous every 7 days  ferrous    sulfate 325 milliGRAM(s) Oral two times a day  glucagon  Injectable 1 milliGRAM(s) IntraMuscular once  heparin  Infusion. 2200 Unit(s)/Hr (22 mL/Hr) IV Continuous <Continuous>  insulin glargine Injectable (LANTUS) 16 Unit(s) SubCutaneous at bedtime  insulin lispro (ADMELOG) corrective regimen sliding scale   SubCutaneous <User Schedule>  insulin lispro (ADMELOG) corrective regimen sliding scale   SubCutaneous three times a day before meals  insulin lispro Injectable (ADMELOG) 8 Unit(s) SubCutaneous three times a day before meals  levothyroxine 75 MICROGram(s) Oral daily  midodrine. 15 milliGRAM(s) Oral three times a day  Nephro-molly 1 Tablet(s) Oral daily  senna 2 Tablet(s) Oral at bedtime    MEDICATIONS  (PRN):  dextrose Oral Gel 15 Gram(s) Oral once PRN Blood Glucose LESS THAN 70 milliGRAM(s)/deciliter  polyethylene glycol 3350 17 Gram(s) Oral two times a day PRN Constipation      Objective:    Vitals: Vital Signs Last 24 Hrs  T(C): 36.6 (05-17-24 @ 05:09), Max: 37.5 (05-16-24 @ 20:51)  T(F): 97.9 (05-17-24 @ 05:09), Max: 99.5 (05-16-24 @ 20:51)  HR: 71 (05-17-24 @ 05:09) (70 - 76)  BP: 122/68 (05-17-24 @ 05:09) (118/74 - 136/65)  BP(mean): --  RR: 18 (05-17-24 @ 05:09) (18 - 18)  SpO2: 92% (05-17-24 @ 05:09) (92% - 97%)                I&O's Summary    15 May 2024 07:01  -  16 May 2024 07:00  --------------------------------------------------------  IN: 1260 mL / OUT: 2300 mL / NET: -1040 mL    16 May 2024 07:01  -  17 May 2024 06:31  --------------------------------------------------------  IN: 1060 mL / OUT: 800 mL / NET: 260 mL        PHYSICAL EXAM:  GENERAL: NAD  HEAD:  Atraumatic, Normocephalic  EYES: EOMI, PERRL, conjunctiva and sclera clear  ENMT: Moist mucous membranes  NECK: Supple  CHEST/LUNG: Clear to auscultation bilaterally; No rales, rhonchi, wheezing, or rubs  HEART: Regular rate and rhythm, 4/6 systolic murmur  ABDOMEN: Soft, Nontender, Nondistended; Bowel sounds present  EXTREMITIES:  2+ Peripheral Pulses, 2+ b/l edema up to thighs, No clubbing, cyanosis.  Left arm swelling > right  SKIN: generalized erythema w/ skin sloughing and desquamation, nonpruritic. Improving  NERVOUS SYSTEM:  Alert & Oriented X3, no focal deficits  PSYCH:  Appropriate affect    LABS:                        9.9    8.56  )-----------( 225      ( 17 May 2024 04:49 )             34.0                         9.4    7.25  )-----------( 211      ( 16 May 2024 06:46 )             31.1                         9.0    8.84  )-----------( 209      ( 15 May 2024 07:08 )             30.0     05-17    136  |  97  |  65<H>  ----------------------------<  268<H>  4.1   |  24  |  2.37<H>  05-16    133<L>  |  96  |  69<H>  ----------------------------<  260<H>  4.3   |  26  |  2.53<H>  05-15    134<L>  |  95<L>  |  72<H>  ----------------------------<  97  3.6   |  25  |  2.86<H>    Ca    8.6      17 May 2024 04:52  Ca    8.7      16 May 2024 06:46  Ca    8.5      15 May 2024 07:09  Phos  3.2     05-17  Mg     2.2     05-17    TPro  6.4  /  Alb  2.9<L>  /  TBili  0.4  /  DBili  x   /  AST  22  /  ALT  10  /  AlkPhos  86  05-17  TPro  6.4  /  Alb  2.9<L>  /  TBili  0.5  /  DBili  x   /  AST  20  /  ALT  9<L>  /  AlkPhos  80  05-16  TPro  6.0  /  Alb  2.9<L>  /  TBili  0.4  /  DBili  x   /  AST  18  /  ALT  11  /  AlkPhos  76  05-15    CAPILLARY BLOOD GLUCOSE      POCT Blood Glucose.: 308 mg/dL (17 May 2024 02:12)  POCT Blood Glucose.: 180 mg/dL (16 May 2024 21:46)  POCT Blood Glucose.: 178 mg/dL (16 May 2024 17:11)  POCT Blood Glucose.: 193 mg/dL (16 May 2024 12:31)  POCT Blood Glucose.: 260 mg/dL (16 May 2024 08:19)    PT/INR - ( 16 May 2024 19:10 )   PT: 10.1 sec;   INR: 0.96 ratio         PTT - ( 17 May 2024 04:52 )  PTT:192.7 sec    Urinalysis Basic - ( 17 May 2024 04:52 )    Color: x / Appearance: x / SG: x / pH: x  Gluc: 268 mg/dL / Ketone: x  / Bili: x / Urobili: x   Blood: x / Protein: x / Nitrite: x   Leuk Esterase: x / RBC: x / WBC x   Sq Epi: x / Non Sq Epi: x / Bacteria: x          RADIOLOGY & ADDITIONAL TESTS:    Imaging Personally Reviewed:  [x ] YES  [ ] NO    Consultants involved in case:   Consultant(s) Notes Reviewed:  [ x] YES  [ ] NO:   Care Discussed with Consultants/Other Providers [x ] YES  [ ] NO

## 2024-05-17 NOTE — PROGRESS NOTE ADULT - PROBLEM SELECTOR PLAN 6
EF from 4/2024 (post-TAVR) w/ EF 25%, global LV hypokinesis, moderately dilated LV, grade 2 LV diastolic dysfunction, RA moderately dilated.      - cards following   - Will f/u with cardiology regarding resumption of GDMT  - continue midodrine 15mg TID   - Continue atorvastatin 80mg daily  - hold home torsemide 20mg qd  - hold home eplerenone 25mg qd  - hold home metoprolol succinate 100mg qd  - restart GDMT as tolerated  - f/u w/ EP outpatient for CRT.

## 2024-05-17 NOTE — PROGRESS NOTE ADULT - ASSESSMENT
73F w HFrEF (EF 30%), mod AS, known LBBB, HTN, IDDM1, CKD, hypothyroidism, chronic lymphedema, p/w 1 episode of syncope with R arm jerking.     #Syncope-Aortic Stenosis  - Known Aortic Stenosis likely Severe in setting of decreased LVEF  - s/p tavr on 4/24 c/b VT -> shock -> VFib -> shock  - hypoxic/congested, and was intubated, now extubated on 4/25 in the evening, on NC   - on 4/25 with worsening bradycardia, and now s/p TVP placement followed by AV Micra placement with removal of TVP  - Remains in Sinus Rhythm/known lbbb with intermittent   - Vaso has been weaned off with the uptitration of midodrine  - On Midodrine 15mg Q8  - to consider CRT-D in the future  - Cannot initiate GDMT due to blood pressures  - would try to mobilize as best as possible and monitor orthostatics  - cont asa and statin    #Chronic Systolic HF (EF 30%), mod to severe AS, HTN, LBBB, Lymphedema   - Has known systolic HF and AS.    - Repeat TTE showed EF 30%, mild-mod LVH, mildly reduced RVF, mod-severe AS (.61 cmsq), mod pHTN  - On home Torsemide 20 mg daily, Eplerenone 25 mg daily, and Toprol  mg daily, all currently on hold  - Orthostatic, diuretics on hold  - On Midodrine 10mg TID for orthostatic hypotension, now increased to 15mg TID, though BPs remain stable, would attempt to wean midodrine as able, in the hopes of re-initiating some GDMT  - initially CVVHD, then HD  - HD now on hold, as she is urinating and creatinine improving  - prn iv bumex  - renal fu    - overall improving. needs PT  - will follow with you

## 2024-05-17 NOTE — PROGRESS NOTE ADULT - ASSESSMENT
73F w/h/o of T1DM with unknown control due to CKD and anemia of chronic disease. DM c/b CKD. Also h/o HFrEF (EF 30%), mod AS, known LBBB, HTN,  hypothyroidism, chronic lymphedema, suspected OHS/LELIA on 3L NC home O2 p/w 1 episode of syncope with R arm jerking, likely 2/2 severe symptomatic AS. S/p AVR 4/24 c/b DUNCAN on CKD, fever and hypotension. Also UTI/pul edema/ sepsis and L adrenal nodule finding. Endocrine consult done for DM and adrenal nodule management. Tolerating POs with persistent overnight and fasting hyperglycemia even though basal insulin dose has been increased for the past 2 days. Will increase basal insulin to BG goal 100 to 180s. No hypoglycemia.   Noted creat improving every day.      Per endocrine attending Dr Burton> Adrenal nodule work up completed and pt will need to f/u once she is more stable as out pt.       Home regimen: Lantus 33 units qhs, Novolog based on sliding scale TIDAC normally 3-5 units  Has Dexcom G7

## 2024-05-17 NOTE — PROGRESS NOTE ADULT - SUBJECTIVE AND OBJECTIVE BOX
[facility] - Howard Kimble, Carlos Luong, Herman Alston  Office Number:  [phone]    Patient resting comfortably in bed in NAD.  Laying flat with no respiratory distress.  No complaints of chest pain, dyspnea, palpitations, PND, or orthopnea.  Off NC and on RA, feeling well  Cr continues to improve  PTT supratherapeutic this morning     ROS: negative unless otherwise mentioned.    Telemetry: off    MEDICATIONS  (STANDING):  ammonium lactate 12% Lotion 1 Application(s) Topical <User Schedule>  aspirin  chewable 81 milliGRAM(s) Oral daily  atorvastatin 80 milliGRAM(s) Oral at bedtime  calamine/zinc oxide Lotion 1 Application(s) Topical three times a day  dextrose 10% Bolus 125 milliLiter(s) IV Bolus once  dextrose 5%. 1000 milliLiter(s) (100 mL/Hr) IV Continuous <Continuous>  dextrose 5%. 1000 milliLiter(s) (50 mL/Hr) IV Continuous <Continuous>  dextrose 50% Injectable 12.5 Gram(s) IV Push once  dextrose 50% Injectable 25 Gram(s) IV Push once  epoetin mendoza-epbx (RETACRIT) Injectable 66675 Unit(s) SubCutaneous every 7 days  ferrous    sulfate 325 milliGRAM(s) Oral two times a day  glucagon  Injectable 1 milliGRAM(s) IntraMuscular once  heparin  Infusion. 2200 Unit(s)/Hr (22 mL/Hr) IV Continuous <Continuous>  insulin glargine Injectable (LANTUS) 16 Unit(s) SubCutaneous at bedtime  insulin lispro (ADMELOG) corrective regimen sliding scale   SubCutaneous <User Schedule>  insulin lispro (ADMELOG) corrective regimen sliding scale   SubCutaneous three times a day before meals  insulin lispro Injectable (ADMELOG) 8 Unit(s) SubCutaneous three times a day before meals  levothyroxine 75 MICROGram(s) Oral daily  midodrine. 15 milliGRAM(s) Oral three times a day  Nephro-molly 1 Tablet(s) Oral daily  senna 2 Tablet(s) Oral at bedtime    MEDICATIONS  (PRN):  dextrose Oral Gel 15 Gram(s) Oral once PRN Blood Glucose LESS THAN 70 milliGRAM(s)/deciliter  polyethylene glycol 3350 17 Gram(s) Oral two times a day PRN Constipation      Allergies    contrast media (iodine-based) (Fever; Vomiting; Flushing; Hypotension; Rash; Stomach Upset)  Ativan (Rash; Urticaria)  penicillins (Hives (Mod to Severe); Anaphylaxis (Mod to Severe); Short breath (Mod to Severe); Angioedema (Mod to Severe))    Intolerances        Vital Signs Last 24 Hrs  T(C): 36.6 (17 May 2024 05:09), Max: 37.5 (16 May 2024 20:51)  T(F): 97.9 (17 May 2024 05:09), Max: 99.5 (16 May 2024 20:51)  HR: 71 (17 May 2024 05:09) (70 - 76)  BP: 122/68 (17 May 2024 05:09) (118/74 - 136/65)  BP(mean): --  RR: 18 (17 May 2024 05:09) (18 - 18)  SpO2: 92% (17 May 2024 05:09) (92% - 97%)    Parameters below as of 17 May 2024 05:09  Patient On (Oxygen Delivery Method): room air        I&O's Summary    16 May 2024 07:01  -  17 May 2024 07:00  --------------------------------------------------------  IN: 1060 mL / OUT: 2000 mL / NET: -940 mL        ON EXAM:    General: NAD, awake and alert, oriented x 3  HEENT: Mucous membranes are moist, anicteric  Lungs: Non-labored, breath sounds are clear bilaterally, No wheezing, rales or rhonchi  Cardiovascular: Regular, S1 and S2, harsh systolic murmur  Gastrointestinal: Bowel Sounds present, soft, nontender.   Lymph: chronic lymphedema. Wrapped   Skin: No rashes or ulcers  Psych:  Mood & affect appropriate    LABS: All Labs Reviewed:                        9.9    8.56  )-----------( 225      ( 17 May 2024 04:49 )             34.0                         9.4    7.25  )-----------( 211      ( 16 May 2024 06:46 )             31.1                         9.0    8.84  )-----------( 209      ( 15 May 2024 07:08 )             30.0     17 May 2024 04:52    136    |  97     |  65     ----------------------------<  268    4.1     |  24     |  2.37   16 May 2024 06:46    133    |  96     |  69     ----------------------------<  260    4.3     |  26     |  2.53   15 May 2024 07:09    134    |  95     |  72     ----------------------------<  97     3.6     |  25     |  2.86     Ca    8.6        17 May 2024 04:52  Ca    8.7        16 May 2024 06:46  Ca    8.5        15 May 2024 07:09  Phos  3.2       17 May 2024 04:52  Phos  3.7       16 May 2024 06:46  Phos  3.9       15 May 2024 07:09  Mg     2.2       17 May 2024 04:52  Mg     2.2       16 May 2024 06:46  Mg     2.3       15 May 2024 07:09    TPro  6.4    /  Alb  2.9    /  TBili  0.4    /  DBili  x      /  AST  22     /  ALT  10     /  AlkPhos  86     17 May 2024 04:52  TPro  6.4    /  Alb  2.9    /  TBili  0.5    /  DBili  x      /  AST  20     /  ALT  9      /  AlkPhos  80     16 May 2024 06:46  TPro  6.0    /  Alb  2.9    /  TBili  0.4    /  DBili  x      /  AST  18     /  ALT  11     /  AlkPhos  76     15 May 2024 07:09    PT/INR - ( 16 May 2024 19:10 )   PT: 10.1 sec;   INR: 0.96 ratio         PTT - ( 17 May 2024 04:52 )  PTT:192.7 sec      Blood Culture:

## 2024-05-17 NOTE — PROGRESS NOTE ADULT - PROBLEM SELECTOR PLAN 7
U/A w/ large LE, 501 WBC, 5 RBC and mod bacteria. Urine cx growing 10-49k Vancomycin resistant enterococcus faecalis. Will hold on amoxicillin given asymptomatic nature of UTI    - s/p ceftriaxone (5/8-5/10)  - Trial of void today

## 2024-05-17 NOTE — PROGRESS NOTE ADULT - PROBLEM SELECTOR PLAN 1
Likely in setting of deconditioning.  Improving with fluids.  Will attempt orthostatics daily, if still not improved will increase midodrine  - leg compression, abdominal binder   - PT rec HONEY   - OOB as tolerated   - midodrine 15mg TID.  - May be volume down, getting fluids for DKA Likely in setting of deconditioning.  Improving with fluids.  Will attempt orthostatics daily.  Hoping to wean off midodrine in order to restart GDMT  - leg compression, abdominal binder   - PT rec HONEY   - OOB as tolerated   - midodrine 15mg TID.  - May be volume down  - Fluids PRN

## 2024-05-17 NOTE — PROGRESS NOTE ADULT - PROBLEM SELECTOR PLAN 1
- Test BG ac and hs/2am  - Adjust Lantus to 20 units at hs for now. If BG at bedtime <100 please administer 10 units and provide snack. Will re-assess daily   - Adjust Admelog dose to 10 units TIDAC for now> will adjust as needed  - C/w Low dose admelog correction scale TIDAC, Low dose admelog correction scale QHS and 2am in case of rebound hypo/hyperglycemia  -Will follow  Discharge:  Will be determined according to BG/insulin needs/PO intake  at time of discharge. Basal/bolus insulin doses TBD  - Of note, patient instructed on discharge from recent hospitalization at [facility] to start farxiga for CHF. Given risks of DKA of SGLT2i in T1DM, would not start until better data is available for its benefits.  - Follow up with Dr. Luis Dumont outpatient  -Pt will likely required rehab  F/u with optho/renal/cardiac as out pt

## 2024-05-18 NOTE — PROGRESS NOTE ADULT - PROBLEM SELECTOR PLAN 1
Likely in setting of deconditioning.  Improving with fluids.  Will attempt orthostatics daily.  Hoping to wean off midodrine in order to restart GDMT  - leg compression, abdominal binder   - PT rec HONEY   - OOB as tolerated   - midodrine 15mg TID.  - May be volume down  - Fluids PRN

## 2024-05-18 NOTE — PROGRESS NOTE ADULT - TIME BILLING
- Review of records, telemetry, vital signs and daily labs.   - General and cardiovascular physical examination.  - Generation of cardiovascular treatment plan.  - Coordination of care.      Patient was seen and examined by me on,interim events noted,labs and radiology studies reviewed.  Cuco Tubbs MD,FACC.  [address] [place] [address].  [id number].  [phone]

## 2024-05-18 NOTE — PROGRESS NOTE ADULT - ASSESSMENT
73F w HFrEF (EF 30%), mod AS, known LBBB, HTN, IDDM1, CKD, hypothyroidism, chronic lymphedema, suspected OHS/LELIA on 3L NC home O2 p/w 1 episode of syncope. Recent admission at [facility] (3/29-4/5) for ADHF and DUNCAN s/p diuresis, discharged with new home O2 3L NC suspecting OHS/LELIA pending outpatient w/u. Since discharge a week ago, she has been staying at home with   Pt had sob walking to car. Once in car, she was still breathing heavily. Partner noticed pt was not responding to verbal stimuli for 10-15sec and witnessed R arm jerking. Pt gained consciousness quickly without postictal symptoms. Pt went to Cardiologist Dr. Nathan who recommended pt to come to ED. No fever, cp, abd pain, n/v. Leg swelling is improved from prior. Pt on torsemide 40mg which she has been taking. Pt was discharged on 3LNC which she is currently on. Patient reports she did not take Farxiga that she was discharged on as she was concerned about side effects.     In ED, patient was found afebrile, hemodynamically stable, breathing well on 3L NC. Initial labs notable for mild hyponatremia, DUNCAN on CKD, elevated proBNP and elevated pCO2 both improved from prior.  pt also noticed with abn creatinine. had severe AS had ct scan with IV contrast had contrast nephropathy and hypotension ---> CRRT required       1- CKD IV  with DUNCAN   2- CHF   3- lymphedema   4- severe AS  s/p tavr 4/24  5- hypotension       creatinine is slowly downtrending  s/p IVF  anemia, trend hgb  retacrit 10,000 units weekly  non-oliguric, off Hd   hardy --->  for urinary retention  orthostatic hypotension, continue midodrine 15 mg tid,  trend bp  endo following  cont to hold diuretics today as well but eventually will need them  PT   strict I/O   trend creatinine and electrolytes daily

## 2024-05-18 NOTE — PROGRESS NOTE ADULT - ATTENDING COMMENTS
73F PMH: HFrEF (EF 30%), AS, LBBB, HTN, DM1, CKD, hypothyroidism, chronic lymphedema, LELIA (3L home O2) p/w for syncope 2/2 severe AS, s/p TAVR 4/24. Course complicated by contrast induced allergic reaction (had CTA for TAVR eval) and contrast related renal failure (acute on chronic) requiring HD. TAVR c/b VT and vfib requiring shock and flash pulmonary edema causing AHRF requiring intubation.     Course complicated by contrast induced allergic reaction (had CTA for TAVR eval) and contrast related renal failure (acute on chronic) requiring CRRT for fluid removal and pressors for vasoplegia and hypovolemia due to CRRT. Patient extubated, now off pressors, CRRT.     DUNCAN, hyperglycemia improving.     Persistently orthostatic despite fluids and increased Midodrine. Pt now anxious and unwilling to try standing. Going from laying to sitting, orthostatic vitals are negative for orthostatic hypotension.

## 2024-05-18 NOTE — PROGRESS NOTE ADULT - PROBLEM SELECTOR PLAN 1
Test BG ac and hs/2am  - Adjust Lantus to 24 units at hs for now. If BG at bedtime <100 please administer 12 units and provide snack. Will re-assess daily   - adkist Admelog dose to 8 units TIDAC for now> will adjust as needed  - C/w Low dose admelog correction scale TIDAC, Low dose admelog correction scale QHS and 2am in case of rebound hypo/hyperglycemia  -Will follow  Discharge:  Will be determined according to BG/insulin needs/PO intake  at time of discharge. Basal/bolus insulin doses TBD  - Of note, patient instructed on discharge from recent hospitalization at [place] to start farxiga for CHF. Given risks of DKA of SGLT2i in T1DM, would not start until better data is available for its benefits.  - Follow up with Dr. Luis Dumont outpatient  -Pt will likely required rehab  F/u with optho/renal/cardiac as out pt.

## 2024-05-18 NOTE — PROGRESS NOTE ADULT - NSPROGADDITIONALINFOA_GEN_ALL_CORE
Any inquiries please email [email or web address]   office:  [phone] (M-F 9a-5pm)               [phone] (nights/weekends)   Can access Amion coverage via sunrise/tools

## 2024-05-18 NOTE — PROGRESS NOTE ADULT - SUBJECTIVE AND OBJECTIVE BOX
Diabetes Follow up note:    Chief complaint: T2DM    Interval Hx: BG values 80-200s over past 24 hours. Pt seen at bedside. Reports tolerating POs, eating most of meals. No other complaints at this time.     Review of Systems:  General: denies pain  GI: Tolerating POs. Denies N/V/D/Abd pain  CV: Denies CP/SOB  ENDO: No S&Sx of hypoglycemia    MEDS:  atorvastatin 80 milliGRAM(s) Oral at bedtime    insulin glargine Injectable (LANTUS) 20 Unit(s) SubCutaneous at bedtime  insulin lispro (ADMELOG) corrective regimen sliding scale   SubCutaneous <User Schedule>  insulin lispro (ADMELOG) corrective regimen sliding scale   SubCutaneous three times a day before meals  insulin lispro Injectable (ADMELOG) 10 Unit(s) SubCutaneous three times a day before meals  levothyroxine 75 MICROGram(s) Oral daily      Allergies    contrast media (iodine-based) (Fever; Vomiting; Flushing; Hypotension; Rash; Stomach Upset)  Ativan (Rash; Urticaria)  penicillins (Hives (Mod to Severe); Anaphylaxis (Mod to Severe); Short breath (Mod to Severe); Angioedema (Mod to Severe))        PE:  General: Female lying in bed. NAD.   Vital Signs Last 24 Hrs  T(C): 36.7 (18 May 2024 11:37), Max: 36.7 (18 May 2024 11:37)  T(F): 98.1 (18 May 2024 11:37), Max: 98.1 (18 May 2024 11:37)  HR: 79 (18 May 2024 11:37) (72 - 81)  BP: 124/65 (18 May 2024 05:33) (124/65 - 153/69)  BP(mean): --  RR: 18 (18 May 2024 11:37) (18 - 18)  SpO2: 94% (18 May 2024 11:37) (94% - 98%)    Parameters below as of 18 May 2024 11:37  Patient On (Oxygen Delivery Method): room air      Abd: Soft, NT,ND, + central adiposity  Extremities: Warm  Neuro: A&O X3    LABS:  POCT Blood Glucose.: 83 mg/dL (05-18-24 @ 12:40)  POCT Blood Glucose.: 202 mg/dL (05-18-24 @ 09:13)  POCT Blood Glucose.: 184 mg/dL (05-18-24 @ 02:01)  POCT Blood Glucose.: 222 mg/dL (05-17-24 @ 21:29)  POCT Blood Glucose.: 162 mg/dL (05-17-24 @ 17:42)  POCT Blood Glucose.: 191 mg/dL (05-17-24 @ 12:58)  POCT Blood Glucose.: 242 mg/dL (05-17-24 @ 08:44)  POCT Blood Glucose.: 308 mg/dL (05-17-24 @ 02:12)  POCT Blood Glucose.: 180 mg/dL (05-16-24 @ 21:46)  POCT Blood Glucose.: 178 mg/dL (05-16-24 @ 17:11)  POCT Blood Glucose.: 193 mg/dL (05-16-24 @ 12:31)  POCT Blood Glucose.: 260 mg/dL (05-16-24 @ 08:19)  POCT Blood Glucose.: 203 mg/dL (05-16-24 @ 02:15)  POCT Blood Glucose.: 174 mg/dL (05-15-24 @ 23:16)  POCT Blood Glucose.: 153 mg/dL (05-15-24 @ 21:25)  POCT Blood Glucose.: 165 mg/dL (05-15-24 @ 17:23)                            9.2    9.23  )-----------( 217      ( 18 May 2024 05:34 )             30.8       05-18    137  |  98  |  56<H>  ----------------------------<  193<H>  4.1   |  28  |  2.21<H>    eGFR: 23<L>    Ca    9.1      05-18  Mg     2.2     05-18  Phos  3.3     05-18    TPro  6.0  /  Alb  2.7<L>  /  TBili  0.5  /  DBili  x   /  AST  18  /  ALT  10  /  AlkPhos  78  05-18        A1C with Estimated Average Glucose Result: 7.4 % (03-30-24 @ 08:21)  A1C with Estimated Average Glucose Result: 6.8 % (01-10-24 @ 08:37)  A1C with Estimated Average Glucose Result: 7.3 % (08-12-23 @ 07:58)  A1C with Estimated Average Glucose Result: 8.2 % (06-05-23 @ 06:10)      C-Peptide, Serum: <0.1 ng/mL (04-23 @ 06:50)      Contact number: kishore [phone] or [phone]

## 2024-05-18 NOTE — PROGRESS NOTE ADULT - ASSESSMENT
3F w/h/o of T1DM with unknown control due to CKD and anemia of chronic disease. DM c/b CKD. Also h/o HFrEF (EF 30%), mod AS, known LBBB, HTN,  hypothyroidism, chronic lymphedema, suspected OHS/LELIA on 3L NC home O2 p/w 1 episode of syncope with R arm jerking, likely 2/2 severe symptomatic AS. S/p AVR 4/24 c/b DUNCAN on CKD, fever and hypotension. Also UTI/pul edema/ sepsis and L adrenal nodule finding. Endocrine consult done for DM and adrenal nodule management. Tolerating POs with persistent overnight and fasting hyperglycemia even though basal insulin dose has been increased for the past 2 days. Will increase basal insulin to BG goal 100 to 180s. No hypoglycemia.   Noted creat improving every day.      Per endocrine attending Dr Burton> Adrenal nodule work up completed and pt will need to f/u once she is more stable as out pt.       Home regimen: Lantus 33 units qhs, Novolog based on sliding scale TIDAC normally 3-5 units  Has Dexcom G7

## 2024-05-18 NOTE — PROGRESS NOTE ADULT - SUBJECTIVE AND OBJECTIVE BOX
[facility]   [phone]  RENAL FOLLOW UP NOTE  --------------------------------------------------------------------------------  Chief Complaint:    24 hour events/subjective:    seen earlier   states feels better   but still with postural sx     PAST HISTORY  --------------------------------------------------------------------------------  No significant changes to PMH, PSH, FHx, SHx, unless otherwise noted    ALLERGIES & MEDICATIONS  --------------------------------------------------------------------------------  Allergies    contrast media (iodine-based) (Fever; Vomiting; Flushing; Hypotension; Rash; Stomach Upset)  Ativan (Rash; Urticaria)  penicillins (Hives (Mod to Severe); Anaphylaxis (Mod to Severe); Short breath (Mod to Severe); Angioedema (Mod to Severe))    Intolerances      Standing Inpatient Medications  ammonium lactate 12% Lotion 1 Application(s) Topical <User Schedule>  apixaban 10 milliGRAM(s) Oral two times a day  aspirin  chewable 81 milliGRAM(s) Oral daily  atorvastatin 80 milliGRAM(s) Oral at bedtime  calamine/zinc oxide Lotion 1 Application(s) Topical three times a day  dextrose 10% Bolus 125 milliLiter(s) IV Bolus once  dextrose 5%. 1000 milliLiter(s) IV Continuous <Continuous>  dextrose 5%. 1000 milliLiter(s) IV Continuous <Continuous>  dextrose 50% Injectable 25 Gram(s) IV Push once  dextrose 50% Injectable 12.5 Gram(s) IV Push once  epoetin mendoza-epbx (RETACRIT) Injectable 53303 Unit(s) SubCutaneous every 7 days  ferrous    sulfate 325 milliGRAM(s) Oral two times a day  glucagon  Injectable 1 milliGRAM(s) IntraMuscular once  insulin glargine Injectable (LANTUS) 24 Unit(s) SubCutaneous at bedtime  insulin lispro (ADMELOG) corrective regimen sliding scale   SubCutaneous <User Schedule>  insulin lispro (ADMELOG) corrective regimen sliding scale   SubCutaneous three times a day before meals  insulin lispro Injectable (ADMELOG) 8 Unit(s) SubCutaneous three times a day before meals  lactated ringers. 1000 milliLiter(s) IV Continuous <Continuous>  levothyroxine 75 MICROGram(s) Oral daily  midodrine. 15 milliGRAM(s) Oral three times a day  Nephro-molly 1 Tablet(s) Oral daily  senna 2 Tablet(s) Oral at bedtime    PRN Inpatient Medications  dextrose Oral Gel 15 Gram(s) Oral once PRN  polyethylene glycol 3350 17 Gram(s) Oral two times a day PRN      REVIEW OF SYSTEMS  --------------------------------------------------------------------------------    Gen: denies  fevers/chills,  CVS: denies chest pain/palpitations  Resp: denies SOB/Cough  GI: Denies N/V/Abd pain  : Denies dysuria    VITALS/PHYSICAL EXAM  --------------------------------------------------------------------------------  T(C): 36.7 (05-18-24 @ 11:37), Max: 36.7 (05-18-24 @ 11:37)  HR: 79 (05-18-24 @ 11:37) (72 - 81)  BP: 124/65 (05-18-24 @ 05:33) (124/65 - 153/69)  RR: 18 (05-18-24 @ 11:37) (18 - 18)  SpO2: 94% (05-18-24 @ 11:37) (94% - 98%)  Wt(kg): --    Weight (kg): 125.8 (05-16-24 @ 20:51)      05-17-24 @ 07:01  -  05-18-24 @ 07:00  --------------------------------------------------------  IN: 360 mL / OUT: 1500 mL / NET: -1140 mL    05-18-24 @ 07:01  -  05-18-24 @ 19:53  --------------------------------------------------------  IN: 0 mL / OUT: 700 mL / NET: -700 mL      Physical Exam:  	    	         Gen: comfortable appearing   	Pulm: decrease bs  no rales or ronchi or wheezing  	CV: No JVD. RRR, S1S2; no rub II/VI M   	Abd: +BS, soft, nontender/nondistended, obese   	UE: Warm, no cyanosis  no clubbing, no edema  	LE: Warm, no cyanosis  no clubbing, 2-  edema,  	Neuro: alert and oriented       LABS/STUDIES  --------------------------------------------------------------------------------              9.2    9.23  >-----------<  217      [05-18-24 @ 05:34]              30.8     137  |  98  |  56  ----------------------------<  193      [05-18-24 @ 05:34]  4.1   |  28  |  2.21        Ca     9.1     [05-18-24 @ 05:34]      Mg     2.2     [05-18-24 @ 05:34]      Phos  3.3     [05-18-24 @ 05:34]    TPro  6.0  /  Alb  2.7  /  TBili  0.5  /  DBili  x   /  AST  18  /  ALT  10  /  AlkPhos  78  [05-18-24 @ 05:34]      PTT: 192.7      [05-17-24 @ 04:52]      Creatinine Trend:  SCr 2.21 [05-18 @ 05:34]  SCr 2.37 [05-17 @ 04:52]  SCr 2.53 [05-16 @ 06:46]  SCr 2.86 [05-15 @ 07:09]  SCr 3.00 [05-14 @ 22:42]          PTH -- (Ca 8.4)      [04-19-24 @ 17:54]   109  TSH 5.06      [04-14-24 @ 07:18]  Lipid: chol 74, TG 70, HDL 33, LDL --      [04-13-24 @ 07:05]

## 2024-05-18 NOTE — PROGRESS NOTE ADULT - SUBJECTIVE AND OBJECTIVE BOX
MR#[id number]  PATIENT NAME:JOHNATHAN LOPEZ    DATE OF SERVICE: 05-18-24 @ 09:07  Patient was seen and examined by Cuco Tubbs MD on    05-18-24 @ 09:07 .  Interim events noted.Consultant notes ,Labs,Telemetry reviewed by me       Covering for [facility] -Howard Sterling, Carlos Luong Savella, Cohen  Office Number: [phone]         HOSPITAL COURSE: HPI:  73F w HFrEF (EF 30%), mod AS, known LBBB, HTN, IDDM1, CKD, hypothyroidism, chronic lymphedema, suspected OHS/LELIA on 3L NC home O2 p/w 1 episode of syncope. Recent admission at [facility] (3/29-4/5) for ADHF and DUNCAN s/p diuresis, discharged with new home O2 3L NC suspecting OHS/LELIA pending outpatient w/u. Since discharge a week ago, she has been staying at home with   Pt had sob walking to car. Once in car, she was still breathing heavily. Partner noticed pt was not responding to verbal stimuli for 10-15sec and witnessed R arm jerking. Pt gained consciousness quickly without postictal symptoms. Pt went to Cardiologist Dr. Nathan who recommended pt to come to ED. No fever, cp, abd pain, n/v. Leg swelling is improved from prior. Pt on torsemide 40mg which she has been taking. Pt was discharged on 3LNC which she is currently on. Patient reports she did not take Farxiga that she was discharged on as she was concerned about side effects.     In ED, patient was found afebrile, hemodynamically stable, breathing well on 3L NC. Initial labs notable for mild hyponatremia, DUNCAN on CKD, elevated proBNP and elevated pCO2 both improved from prior. (12 Apr 2024 16:31)          INTERIM EVENTS:Patient seen at bedside ,interim events noted.  Awake alert no chest pain    PMH -reviewed admission note, no change since admission    MEDICATIONS  (STANDING):  ammonium lactate 12% Lotion 1 Application(s) Topical <User Schedule>  apixaban 10 milliGRAM(s) Oral two times a day  aspirin  chewable 81 milliGRAM(s) Oral daily  atorvastatin 80 milliGRAM(s) Oral at bedtime  calamine/zinc oxide Lotion 1 Application(s) Topical three times a day  epoetin mendoza-epbx (RETACRIT) Injectable 64392 Unit(s) SubCutaneous every 7 days  ferrous    sulfate 325 milliGRAM(s) Oral two times a day  glucagon  Injectable 1 milliGRAM(s) IntraMuscular once  insulin glargine Injectable (LANTUS) 20 Unit(s) SubCutaneous at bedtime  insulin lispro (ADMELOG) corrective regimen sliding scale   SubCutaneous <User Schedule>  insulin lispro (ADMELOG) corrective regimen sliding scale   SubCutaneous three times a day before meals  insulin lispro Injectable (ADMELOG) 10 Unit(s) SubCutaneous three times a day before meals  lactated ringers. 1000 milliLiter(s) (75 mL/Hr) IV Continuous <Continuous>  levothyroxine 75 MICROGram(s) Oral daily  midodrine. 15 milliGRAM(s) Oral three times a day  Nephro-molly 1 Tablet(s) Oral daily  senna 2 Tablet(s) Oral at bedtime    MEDICATIONS  (PRN):  dextrose Oral Gel 15 Gram(s) Oral once PRN Blood Glucose LESS THAN 70 milliGRAM(s)/deciliter  polyethylene glycol 3350 17 Gram(s) Oral two times a day PRN Constipation            REVIEW OF SYSTEMS:  Constitutional: [ ] fever, [ ]weight loss,  [ x]fatigue [ ]weight gain  Eyes: [ ] visual changes  Respiratory: [ ]shortness of breath;  [ ] cough, [ ]wheezing, [ ]chills, [ ]hemoptysis  Cardiovascular: [ ] chest pain, [ ]palpitations, [ ]dizziness,  [ ]leg swelling[ ]orthopnea[ ]PND  Gastrointestinal: [ ] abdominal pain, [ ]nausea, [ ]vomiting,  [ ]diarrhea [ ]Constipation [ ]Melena  Genitourinary: [ ] dysuria, [ ] hematuria [ ]De [place] Garza  Neurologic: [ ] headaches [ ] tremors[ ]weakness [ ]Paralysis Right[ ] Left[ ]  Skin: [ ] itching, [ ]burning, [ ] rashes  Endocrine: [ ] heat or cold intolerance  Musculoskeletal: [ ] joint pain or swelling; [ ] muscle, back, or extremity pain  Psychiatric: [ ] depression, [ ]anxiety, [ ]mood swings, or [ ]difficulty sleeping  Hematologic: [ ] easy bruising, [ ] bleeding gums    [ ] All remaining systems negative except as per above.   [ ]Unable to obtain.  [x] No change in ROS since admission      Vital Signs Last 24 Hrs  T(C): 36.6 (18 May 2024 05:33), Max: 36.6 (17 May 2024 11:58)  T(F): 97.8 (18 May 2024 05:33), Max: 97.9 (17 May 2024 11:58)  HR: 72 (18 May 2024 05:33) (71 - 81)  BP: 124/65 (18 May 2024 05:33) (104/56 - 153/69)  RR: 18 (18 May 2024 05:33) (18 - 19)  SpO2: 97% (18 May 2024 05:33) (94% - 98%)    Parameters below as of 18 May 2024 05:33  Patient On (Oxygen Delivery Method): room air      I&O's Summary    17 May 2024 07:01  -  18 May 2024 07:00  --------------------------------------------------------  IN: 360 mL / OUT: 1500 mL / NET: -1140 mL        PHYSICAL EXAM:  General: No acute distress BMI-32  HEENT: EOMI, PERRL  Neck: Supple, [ ] JVD  Lungs: Equal air entry bilaterally; [ ] rales [ ] wheezing [ ] rhonchi  Heart: Regular rate and rhythm; [x ] murmur   2/6 [ x] systolic [ ] diastolic [ ] radiation[ ] rubs [ ]  gallops  Abdomen: Nontender, bowel sounds present  Extremities: No clubbing, cyanosis, [ ] edema [ ]Pulses  equal and intact  Nervous system:  Alert & Oriented X3, no focal deficits  Psychiatric: Normal affect  Skin: No rashes or lesions    LABS:  05-18    137  |  98  |  56<H>  ----------------------------<  193<H>  4.1   |  28  |  2.21<H>    Ca    9.1      18 May 2024 05:34  Phos  3.3     05-18  Mg     2.2     05-18    TPro  6.0  /  Alb  2.7<L>  /  TBili  0.5  /  DBili  x   /  AST  18  /  ALT  10  /  AlkPhos  78  05-18    Creatinine Trend: 2.21<--, 2.37<--, 2.53<--, 2.86<--, 3.00<--, 3.10<--                        9.2    9.23  )-----------( 217      ( 18 May 2024 05:34 )             30.8     PT/INR - ( 16 May 2024 19:10 )   PT: 10.1 sec;   INR: 0.96 ratio         PTT - ( 17 May 2024 04:52 )  PTT:192.7 sec     VA Duplex Upper Ext Vein Scan, Bilat (05.16.24 @ 16:47) >  IMPRESSION:  Acute thrombus is present within the left subclavian vein, axillary vein,  and brachial vein.

## 2024-05-18 NOTE — PROGRESS NOTE ADULT - SUBJECTIVE AND OBJECTIVE BOX
***************************************************************  Jairo Kaufman, PG1  Internal Medicine   Pager:  [phone]  ***************************************************************    EVELYN LOPEZ  73y  MRN: [id number]    Patient is a 73y old  Female who presents with a chief complaint of Syncope (17 May 2024 22:24)      Interval/Overnight Events: no events ON.     Subjective: Pt seen and examined at bedside.  Feels better today.  Tried standing a few times during orthostatics yesterday but didn't feel comfortable with it. Having BMs    MEDICATIONS  (STANDING):  ammonium lactate 12% Lotion 1 Application(s) Topical <User Schedule>  apixaban 10 milliGRAM(s) Oral two times a day  aspirin  chewable 81 milliGRAM(s) Oral daily  atorvastatin 80 milliGRAM(s) Oral at bedtime  calamine/zinc oxide Lotion 1 Application(s) Topical three times a day  dextrose 10% Bolus 125 milliLiter(s) IV Bolus once  dextrose 5%. 1000 milliLiter(s) (50 mL/Hr) IV Continuous <Continuous>  dextrose 5%. 1000 milliLiter(s) (100 mL/Hr) IV Continuous <Continuous>  dextrose 50% Injectable 25 Gram(s) IV Push once  dextrose 50% Injectable 12.5 Gram(s) IV Push once  epoetin mendoza-epbx (RETACRIT) Injectable 50074 Unit(s) SubCutaneous every 7 days  ferrous    sulfate 325 milliGRAM(s) Oral two times a day  glucagon  Injectable 1 milliGRAM(s) IntraMuscular once  insulin glargine Injectable (LANTUS) 20 Unit(s) SubCutaneous at bedtime  insulin lispro (ADMELOG) corrective regimen sliding scale   SubCutaneous <User Schedule>  insulin lispro (ADMELOG) corrective regimen sliding scale   SubCutaneous three times a day before meals  insulin lispro Injectable (ADMELOG) 10 Unit(s) SubCutaneous three times a day before meals  lactated ringers. 1000 milliLiter(s) (75 mL/Hr) IV Continuous <Continuous>  levothyroxine 75 MICROGram(s) Oral daily  midodrine. 15 milliGRAM(s) Oral three times a day  Nephro-molly 1 Tablet(s) Oral daily  senna 2 Tablet(s) Oral at bedtime    MEDICATIONS  (PRN):  dextrose Oral Gel 15 Gram(s) Oral once PRN Blood Glucose LESS THAN 70 milliGRAM(s)/deciliter  polyethylene glycol 3350 17 Gram(s) Oral two times a day PRN Constipation      Objective:    Vitals: Vital Signs Last 24 Hrs  T(C): 36.6 (05-18-24 @ 05:33), Max: 36.6 (05-17-24 @ 11:58)  T(F): 97.8 (05-18-24 @ 05:33), Max: 97.9 (05-17-24 @ 11:58)  HR: 72 (05-18-24 @ 05:33) (71 - 81)  BP: 124/65 (05-18-24 @ 05:33) (104/56 - 153/69)  BP(mean): --  RR: 18 (05-18-24 @ 05:33) (18 - 19)  SpO2: 97% (05-18-24 @ 05:33) (94% - 98%)                I&O's Summary    17 May 2024 07:01  -  18 May 2024 07:00  --------------------------------------------------------  IN: 360 mL / OUT: 1500 mL / NET: -1140 mL        PHYSICAL EXAM:  GENERAL: NAD  HEAD:  Atraumatic, Normocephalic  EYES: EOMI, PERRL, conjunctiva and sclera clear  ENMT: Moist mucous membranes  NECK: Supple  CHEST/LUNG: Clear to auscultation bilaterally; No rales, rhonchi, wheezing, or rubs  HEART: Regular rate and rhythm, 4/6 systolic murmur  ABDOMEN: Soft, Nontender, Nondistended; Bowel sounds present  EXTREMITIES:  2+ Peripheral Pulses, 2+ b/l edema up to thighs, No clubbing, cyanosis.  Left arm swelling > right  SKIN: generalized erythema w/ skin sloughing and desquamation, nonpruritic. Improving  NERVOUS SYSTEM:  Alert & Oriented X3, no focal deficits  PSYCH:  Appropriate affect    LABS:                        9.2    9.23  )-----------( 217      ( 18 May 2024 05:34 )             30.8                         9.9    8.56  )-----------( 225      ( 17 May 2024 04:49 )             34.0                         9.4    7.25  )-----------( 211      ( 16 May 2024 06:46 )             31.1     05-18    137  |  98  |  56<H>  ----------------------------<  193<H>  4.1   |  28  |  2.21<H>  05-17    136  |  97  |  65<H>  ----------------------------<  268<H>  4.1   |  24  |  2.37<H>  05-16    133<L>  |  96  |  69<H>  ----------------------------<  260<H>  4.3   |  26  |  2.53<H>    Ca    9.1      18 May 2024 05:34  Ca    8.6      17 May 2024 04:52  Ca    8.7      16 May 2024 06:46  Phos  3.3     05-18  Mg     2.2     05-18    TPro  6.0  /  Alb  2.7<L>  /  TBili  0.5  /  DBili  x   /  AST  18  /  ALT  10  /  AlkPhos  78  05-18  TPro  6.4  /  Alb  2.9<L>  /  TBili  0.4  /  DBili  x   /  AST  22  /  ALT  10  /  AlkPhos  86  05-17  TPro  6.4  /  Alb  2.9<L>  /  TBili  0.5  /  DBili  x   /  AST  20  /  ALT  9<L>  /  AlkPhos  80  05-16    CAPILLARY BLOOD GLUCOSE      POCT Blood Glucose.: 184 mg/dL (18 May 2024 02:01)  POCT Blood Glucose.: 222 mg/dL (17 May 2024 21:29)  POCT Blood Glucose.: 162 mg/dL (17 May 2024 17:42)  POCT Blood Glucose.: 191 mg/dL (17 May 2024 12:58)    PT/INR - ( 16 May 2024 19:10 )   PT: 10.1 sec;   INR: 0.96 ratio         PTT - ( 17 May 2024 04:52 )  PTT:192.7 sec    Urinalysis Basic - ( 18 May 2024 05:34 )    Color: x / Appearance: x / SG: x / pH: x  Gluc: 193 mg/dL / Ketone: x  / Bili: x / Urobili: x   Blood: x / Protein: x / Nitrite: x   Leuk Esterase: x / RBC: x / WBC x   Sq Epi: x / Non Sq Epi: x / Bacteria: x          RADIOLOGY & ADDITIONAL TESTS:    Imaging Personally Reviewed:  [x ] YES  [ ] NO    Consultants involved in case:   Consultant(s) Notes Reviewed:  [ x] YES  [ ] NO:   Care Discussed with Consultants/Other Providers [x ] YES  [ ] NO

## 2024-05-18 NOTE — PROGRESS NOTE ADULT - PROBLEM SELECTOR PLAN 2
TSH slightly elevated to 4.79-5.06 with normal FT4 1.3-1.5  - Continue home LT4 75mcg daily  - repeat TFT's outpatient when clinically stabilized  - Please make sure LT4 is given on an empty stomach at least one hour apart from other meds and food to ensure med absorption. DO NOT GIVE WITH VITAMINS/ANTACIDS.

## 2024-05-18 NOTE — PROGRESS NOTE ADULT - PROBLEM SELECTOR PLAN 3
Incidentally found on CT c/a/p w/wo IV contrast done for TAVR evaluation. The left adrenal gland is thickened and a 1.2 x 0.8 cm left adrenal nodule is seen. The right adrenal gland is normal.   Primary team discussed with radiology. HU of the adrenal nodule not able to be accurately determined due to motion artifact, also given imaging was taken at venous phase.    Patient had another CT scan which showed normal adrenals on 5/3/24  Test for excess adrenal hormones:   - Dex suppression test: AM cortisol 9.4 not suppressed with sufficiently high dexamethasone 374 (4/18/24). Repeat test AM cortisol 4.1 not suppressed with ACTH 9.5, dex level 449 (4/20/24). Concerning for possible Cushings, likely primary adrenal source given ACTH not elevated, however, patient is also in ICU on pressors and in a stressed state. Urine cortisol low at 1.2mcg/24 hour but only 200ml for 24 hours. Salivary cortisol cancelled for QNS, repeat salivary cortisol specimen received   - plasma metanephrines 46.8 normal range    - serum aldosterone is elevated to 37.4. Plasma renin activity 9.775. Ratio = 3.82, not elevated, no hyperaldosteronism.    - DHEAS 139 wnl. Androstenedione <10.    PLAN  - Patient with 2 positive DST - concerning for cushing syndrome. No indication for treatment at this time, would recommend repeating testing outpatient after patient resolved from acute critical illness - will give signout to patient's endocrinologist Dr. Luis Dumont prior to discharge   - Follow up salivary cortisol as out pt as per Dr Burton.    - May need to repeat 24 hour urine cortisol once renal function improved and no longer on CRRT   -Renal function improving but remains with creat in 3s. No need to test at this time. If team wishes can send salivary cortisol and 24 hour urine cortisol prior to discharge but per Dr Burton, test results won't be available  until 1-2 weeks.   - Will need follow up outpatient with Dr. Luis Dumont.

## 2024-05-18 NOTE — PROGRESS NOTE ADULT - NSPROGADDITIONALINFOA_GEN_ALL_CORE
DVT ppx: Eliquis  Diet: CC   Dispo: pending clinical course, PT rec HONEY    #Adrenal Nodule  Incidental finding on CT C/A/P w/ thickened L adrenal gland and 1.2 x 0.8 cm L adrenal nodule.  Two positive DST, concerning for cushing syndrome. Endo following.  - no indication for txt at this time  - f/u outpatient w/ Dr. Maynor Dumont for repeat testing.

## 2024-05-19 NOTE — PROVIDER CONTACT NOTE (CRITICAL VALUE NOTIFICATION) - BACKGROUND
Patient admitted with syncope
type 1 dm
Pt admitted for Syncopal episode Severe AS. PMH significant for DM type 1, CHF, HTN, and lymphedema.
Patient admitted for syncope and collapse.

## 2024-05-19 NOTE — PROGRESS NOTE ADULT - PROBLEM SELECTOR PLAN 2
Patient has waxing and waning swelling of left upper extremity during hospitalization, per Catrina.  Noted on exam on 5/16.  DVT Duplex ultrasound Bilateral upper extremities and found to have left axial, brachial, and subclavian DVT on imaging 5/16.  - Begun heparin gtt (5/16), discontinued 5/17  - Begin Eliquis load 10mg BID x 7 days (5/17 - )

## 2024-05-19 NOTE — PROGRESS NOTE ADULT - ATTENDING COMMENTS
73F PMH: HFrEF (EF 30%), AS, LBBB, HTN, DM1, CKD, hypothyroidism, chronic lymphedema, LELIA (3L home O2) p/w for syncope 2/2 severe AS, s/p TAVR 4/24. Course complicated by contrast induced allergic reaction (had CTA for TAVR eval) and contrast related renal failure (acute on chronic) requiring HD. TAVR c/b VT and vfib requiring shock and flash pulmonary edema causing AHRF requiring intubation.     Course complicated by contrast induced allergic reaction (had CTA for TAVR eval) and contrast related renal failure (acute on chronic) requiring CRRT for fluid removal and pressors for vasoplegia and hypovolemia due to CRRT. Patient extubated, now off pressors, CRRT.     DUNCAN, hyperglycemia improving.     Persistently orthostatic despite fluids and increased Midodrine. Will try obtaining full orthostatic vitals today.

## 2024-05-19 NOTE — PROGRESS NOTE ADULT - PROBLEM SELECTOR PLAN 1
Test BG ac and hs/2am  - Adjust Lantus to 22 units at hs for now. If BG at bedtime <100 please administer 10 units and provide snack. Will re-assess daily   -  C/w Admelog dose to 8 units TIDAC for now> will adjust as needed  - C/w Low dose admelog correction scale TIDAC, Low dose admelog correction scale QHS and 2am in case of rebound hypo/hyperglycemia  Discharge:  Will be determined according to BG/insulin needs/PO intake  at time of discharge. Basal/bolus insulin doses TBD  - Of note, patient instructed on discharge from recent hospitalization at [place] to start farxiga for CHF. Given risks of DKA of SGLT2i in T1DM, would not start until better data is available for its benefits.  - Follow up with Dr. Luis Dumont outpatient  -Pt will likely required rehab  F/u with optho/renal/cardiac as out pt.

## 2024-05-19 NOTE — PROGRESS NOTE ADULT - ASSESSMENT
3F w/h/o of T1DM with unknown control due to CKD and anemia of chronic disease. DM c/b CKD. Also h/o HFrEF (EF 30%), mod AS, known LBBB, HTN,  hypothyroidism, chronic lymphedema, suspected OHS/LELIA on 3L NC home O2 p/w 1 episode of syncope with R arm jerking, likely 2/2 severe symptomatic AS. S/p AVR 4/24 c/b DUNCAN on CKD, fever and hypotension. Also UTI/pul edema/ sepsis and L adrenal nodule finding. Endocrine consult done for DM and adrenal nodule management. Tolerating POs with persistent overnight and fasting hyperglycemia even though basal insulin dose has been increased for the past 2 days now with Fasting hypoglycemia on serum BMP , reduce basal insulin to  BG goal 100 to 180s.    Noted creatnine is improved 2.2      Per endocrine attending Dr Burton> Adrenal nodule work up completed and pt will need to f/u once she is more stable as out pt.       Home regimen: Lantus 33 units qhs, Novolog based on sliding scale TIDAC normally 3-5 units  Has Dexcom G7

## 2024-05-19 NOTE — PROGRESS NOTE ADULT - TIME BILLING
- Review of records, telemetry, vital signs and daily labs.   - General and cardiovascular physical examination.  - Generation of cardiovascular treatment plan.  - Coordination of care.      Patient was seen and examined by me on 05/19/2024,interim events noted,labs and radiology studies reviewed.  Cuco Tubbs MD,FACC.  [address] [place], [place] [id number].  [phone]

## 2024-05-19 NOTE — PROGRESS NOTE ADULT - SUBJECTIVE AND OBJECTIVE BOX
seen earlier today     Chief Complaint: Diabetes Mellitus follow up    INTERVAL HX: hypo to 47 on serum bmp , patient denies hypo symptoms report she did not know she was hypo until she was told BG was low, received 1/2amp d50% per provider contact note with Improvement of BG. patient tolerating po reports no changes to routine       Review of Systems:  General: As above  GI: No nausea, vomiting  Endocrine: no  S&Sx of hypoglycemia    Allergies    contrast media (iodine-based) (Fever; Vomiting; Flushing; Hypotension; Rash; Stomach Upset)  Ativan (Rash; Urticaria)  penicillins (Hives (Mod to Severe); Anaphylaxis (Mod to Severe); Short breath (Mod to Severe); Angioedema (Mod to Severe))    Intolerances      MEDICATIONS  (STANDING):  ammonium lactate 12% Lotion 1 Application(s) Topical <User Schedule>  apixaban 10 milliGRAM(s) Oral two times a day  aspirin  chewable 81 milliGRAM(s) Oral daily  atorvastatin 80 milliGRAM(s) Oral at bedtime  calamine/zinc oxide Lotion 1 Application(s) Topical three times a day  dextrose 10% Bolus 125 milliLiter(s) IV Bolus once  dextrose 5%. 1000 milliLiter(s) (100 mL/Hr) IV Continuous <Continuous>  dextrose 5%. 1000 milliLiter(s) (50 mL/Hr) IV Continuous <Continuous>  dextrose 50% Injectable 12.5 Gram(s) IV Push once  dextrose 50% Injectable 25 Gram(s) IV Push once  epoetin mendoza-epbx (RETACRIT) Injectable 44246 Unit(s) SubCutaneous every 7 days  ferrous    sulfate 325 milliGRAM(s) Oral two times a day  glucagon  Injectable 1 milliGRAM(s) IntraMuscular once  insulin glargine Injectable (LANTUS) 20 Unit(s) SubCutaneous at bedtime  insulin lispro (ADMELOG) corrective regimen sliding scale   SubCutaneous <User Schedule>  insulin lispro (ADMELOG) corrective regimen sliding scale   SubCutaneous three times a day before meals  insulin lispro Injectable (ADMELOG) 8 Unit(s) SubCutaneous three times a day before meals  levothyroxine 75 MICROGram(s) Oral daily  midodrine. 15 milliGRAM(s) Oral three times a day  Nephro-molly 1 Tablet(s) Oral daily  senna 2 Tablet(s) Oral at bedtime        atorvastatin   80 milliGRAM(s) Oral (05-18-24 @ 21:44)    dextrose 50% Injectable   12.5 Gram(s) IV Push (05-19-24 @ 07:40)    insulin glargine Injectable (LANTUS)   24 Unit(s) SubCutaneous (05-18-24 @ 21:51)    insulin lispro (ADMELOG) corrective regimen sliding scale   1 Unit(s) SubCutaneous (05-19-24 @ 09:14)    insulin lispro Injectable (ADMELOG)   8 Unit(s) SubCutaneous (05-19-24 @ 12:54)   8 Unit(s) SubCutaneous (05-19-24 @ 09:13)   8 Unit(s) SubCutaneous (05-18-24 @ 17:53)    levothyroxine   75 MICROGram(s) Oral (05-19-24 @ 06:10)        PHYSICAL EXAM:  VITALS: T(C): 36.7 (05-19-24 @ 11:09)  T(F): 98.1 (05-19-24 @ 11:09), Max: 98.1 (05-19-24 @ 11:09)  HR: 76 (05-19-24 @ 11:09) (71 - 76)  BP: 147/72 (05-19-24 @ 11:09) (124/65 - 147/72)  RR:  (18 - 18)  SpO2:  (95% - 96%)  Wt(kg): --  GENERAL: NAD  Respiratory: Respirations unlabored   Extremities: Warm, LE edema ace wraps present   NEURO: Alert , appropriate     LABS:  POCT Blood Glucose.: 128 mg/dL (05-19-24 @ 12:34)  POCT Blood Glucose.: 171 mg/dL (05-19-24 @ 08:50)  POCT Blood Glucose.: 137 mg/dL (05-19-24 @ 08:01)  POCT Blood Glucose.: 74 mg/dL (05-19-24 @ 07:28)  POCT Blood Glucose.: 90 mg/dL (05-19-24 @ 02:10)  POCT Blood Glucose.: 112 mg/dL (05-18-24 @ 21:43)  POCT Blood Glucose.: 93 mg/dL (05-18-24 @ 17:24)  POCT Blood Glucose.: 83 mg/dL (05-18-24 @ 12:40)  POCT Blood Glucose.: 202 mg/dL (05-18-24 @ 09:13)  POCT Blood Glucose.: 184 mg/dL (05-18-24 @ 02:01)  POCT Blood Glucose.: 222 mg/dL (05-17-24 @ 21:29)  POCT Blood Glucose.: 162 mg/dL (05-17-24 @ 17:42)  POCT Blood Glucose.: 191 mg/dL (05-17-24 @ 12:58)  POCT Blood Glucose.: 242 mg/dL (05-17-24 @ 08:44)  POCT Blood Glucose.: 308 mg/dL (05-17-24 @ 02:12)  POCT Blood Glucose.: 180 mg/dL (05-16-24 @ 21:46)  POCT Blood Glucose.: 178 mg/dL (05-16-24 @ 17:11)                          10.0   7.98  )-----------( 219      ( 19 May 2024 06:18 )             33.1     05-19    135  |  99  |  50<H>  ----------------------------<  47<LL>  4.1   |  25  |  2.21<H>    Ca    9.1      19 May 2024 06:18  Phos  3.2     05-19  Mg     2.0     05-19    TPro  6.4  /  Alb  3.0<L>  /  TBili  0.4  /  DBili  x   /  AST  20  /  ALT  12  /  AlkPhos  80  05-19    eGFR: 23 mL/min/1.73m2 (19 May 2024 06:18)    04-13 Chol 74 Direct LDL -- LDL calculated 25 HDL 33<L> Trig 70  Thyroid Function Tests:      A1C with Estimated Average Glucose Result: 7.4 % (03-30-24 @ 08:21)    Estimated Average Glucose: 166 mg/dL (03-30-24 @ 08:21)        Diet, Consistent Carbohydrate w/Evening Snack (05-14-24 @ 17:35) [Active]

## 2024-05-19 NOTE — PROGRESS NOTE ADULT - SUBJECTIVE AND OBJECTIVE BOX
MR#[id number]  PATIENT NAME:JOHNATHAN LOPEZ    DATE OF SERVICE: 05-19-24 @ 07:29  Patient was seen and examined by Cuco Tubbs MD on    05-19-24 @ 07:29 .  Interim events noted.Consultant notes ,Labs,Telemetry reviewed by me     Covering for [facility] -Howard Sterling, Carlos Luong, Herman Alston  Office Number: [phone]       HOSPITAL COURSE: HPI:  73F w HFrEF (EF 30%), mod AS, known LBBB, HTN, IDDM1, CKD, hypothyroidism, chronic lymphedema, suspected OHS/LELIA on 3L NC home O2 p/w 1 episode of syncope. Recent admission at [facility] (3/29-4/5) for ADHF and DUNCAN s/p diuresis, discharged with new home O2 3L NC suspecting OHS/LELIA pending outpatient w/u. Since discharge a week ago, she has been staying at home with   Pt had sob walking to car. Once in car, she was still breathing heavily. Partner noticed pt was not responding to verbal stimuli for 10-15sec and witnessed R arm jerking. Pt gained consciousness quickly without postictal symptoms. Pt went to Cardiologist Dr. Nathan who recommended pt to come to ED. No fever, cp, abd pain, n/v. Leg swelling is improved from prior. Pt on torsemide 40mg which she has been taking. Pt was discharged on 3LNC which she is currently on. Patient reports she did not take Farxiga that she was discharged on as she was concerned about side effects.     INTERIM EVENTS:Patient seen at bedside ,interim events noted.  s/p TAVR 4/24 c/b by VT and Vfib requiring shock and flash pulmonary edema requiring intubation.   Course complicated by contrast induced allergic reaction (had CTA for TAVR eval) and contrast related renal failure (acute on chronic) requiring CRRT for fluid removal and pressors for vasoplegia and hypovolemia due to CRRT.   Patient now extubated, off pressors, CRRT and downgraded to medicine floors.   Awake is off HD still orthostatic      PMH -reviewed admission note, no change since admission    MEDICATIONS  (STANDING):  ammonium lactate 12% Lotion 1 Application(s) Topical <User Schedule>  apixaban 10 milliGRAM(s) Oral two times a day  aspirin  chewable 81 milliGRAM(s) Oral daily  atorvastatin 80 milliGRAM(s) Oral at bedtime  calamine/zinc oxide Lotion 1 Application(s) Topical three times a day  epoetin mendoza-epbx (RETACRIT) Injectable 35076 Unit(s) SubCutaneous every 7 days  ferrous    sulfate 325 milliGRAM(s) Oral two times a day  glucagon  Injectable 1 milliGRAM(s) IntraMuscular once  insulin glargine Injectable (LANTUS) 24 Unit(s) SubCutaneous at bedtime  insulin lispro (ADMELOG) corrective regimen sliding scale   SubCutaneous three times a day before meals  insulin lispro (ADMELOG) corrective regimen sliding scale   SubCutaneous <User Schedule>  insulin lispro Injectable (ADMELOG) 8 Unit(s) SubCutaneous three times a day before meals  lactated ringers. 1000 milliLiter(s) (75 mL/Hr) IV Continuous <Continuous>  levothyroxine 75 MICROGram(s) Oral daily  midodrine. 15 milliGRAM(s) Oral three times a day  Nephro-molly 1 Tablet(s) Oral daily  senna 2 Tablet(s) Oral at bedtime    MEDICATIONS  (PRN):  dextrose Oral Gel 15 Gram(s) Oral once PRN Blood Glucose LESS THAN 70 milliGRAM(s)/deciliter  polyethylene glycol 3350 17 Gram(s) Oral two times a day PRN Constipation            REVIEW OF SYSTEMS:  Constitutional: [ ] fever, [ ]weight loss,  [x ]fatigue [ ]weight gain  Eyes: [ ] visual changes  Respiratory: [ ]shortness of breath;  [ ] cough, [ ]wheezing, [ ]chills, [ ]hemoptysis  Cardiovascular: [ ] chest pain, [ ]palpitations, [ ]dizziness,  [ ]leg swelling[ ]orthopnea[ ]PND  Gastrointestinal: [ ] abdominal pain, [ ]nausea, [ ]vomiting,  [ ]diarrhea [ ]Constipation [ ]Melena  Genitourinary: [ ] dysuria, [ ] hematuria [ ]De La Garza  Neurologic: [ ] headaches [ ] tremors[ ]weakness [ ]Paralysis Right[ ] Left[ ]  Skin: [ ] itching, [ ]burning, [ ] rashes  Endocrine: [ ] heat or cold intolerance  Musculoskeletal: [ ] joint pain or swelling; [ ] muscle, back, or extremity pain  Psychiatric: [ ] depression, [ ]anxiety, [ ]mood swings, or [ ]difficulty sleeping  Hematologic: [ ] easy bruising, [ ] bleeding gums    [ ] All remaining systems negative except as per above.   [ ]Unable to obtain.  [x] No change in ROS since admission      Vital Signs Last 24 Hrs  T(C): 36.7 (19 May 2024 05:13), Max: 36.7 (18 May 2024 11:37)  T(F): 98 (19 May 2024 05:13), Max: 98.1 (18 May 2024 11:37)  HR: 76 (19 May 2024 05:13) (71 - 79)  BP: 124/65 (19 May 2024 05:13) (124/65 - 136/68)  RR: 18 (19 May 2024 05:13) (18 - 18)  SpO2: 96% (19 May 2024 05:13) (94% - 96%)    Parameters below as of 19 May 2024 05:13  Patient On (Oxygen Delivery Method): room air      I&O's Summary    18 May 2024 07:01  -  19 May 2024 07:00  --------------------------------------------------------  IN: 420 mL / OUT: 1550 mL / NET: -1130 mL        PHYSICAL EXAM:  General: No acute distress BMI-32  HEENT: EOMI, PERRL  Neck: Supple, [ ] JVD  Lungs: Equal air entry bilaterally; [ ] rales [ ] wheezing [ ] rhonchi  Heart: Regular rate and rhythm; [x ] murmur   2/6 [ x] systolic [ ] diastolic [ ] radiation[ ] rubs [ ]  gallops  Abdomen: Nontender, bowel sounds present  Extremities: No clubbing, cyanosis, [ xx] edema [ ]Pulses  equal and intact  Nervous system:  Alert & Oriented X3, no focal deficits  Psychiatric: Normal affect  Skin: No rashes or lesions    LABS:  05-19    135  |  99  |  50<H>  ----------------------------<  47<LL>  4.1   |  25  |  2.21<H>    Ca    9.1      19 May 2024 06:18  Phos  3.2     05-19  Mg     2.0     05-19    TPro  6.4  /  Alb  3.0<L>  /  TBili  0.4  /  DBili  x   /  AST  20  /  ALT  12  /  AlkPhos  80  05-19    Creatinine Trend: 2.21<--, 2.21<--, 2.37<--, 2.53<--, 2.86<--, 3.00<--                        10.0   7.98  )-----------( 219      ( 19 May 2024 06:18 )             33.1      VA Duplex Upper Ext Vein Scan, Bilat (05.16.24 @ 16:47) >  IMPRESSION:  Acute thrombus is present within the left subclavian vein, axillary vein,  and brachial vein.        TTE W or WO Ultrasound Enhancing Agent (04.25.24 @ 09:27) >  CONCLUSIONS:      1. Left ventricular cavity is moderately dilated. Left ventricular systolic function is severely decreased with an ejection fraction of 25 % by Nunes's method of disks. Global left ventricular hypokinesis.   2. There is no evidence of a left ventricular thrombus.   3. There is moderate (grade 2) left ventricular diastolic dysfunction, withelevated filling pressure.   4. There is calcification of the mitral valve annulus.   5. A Benitez Bakari (TAVR) valve replacement is present in the aortic position The prosthetic valve is well seated. No paravalvular regurgitation.   6. Moderately enlarged right ventricular cavity size, with normal wall thickness, and normal systolic function.   7. The right atrium is moderately dilated.   8. Left pleural effusion noted.   9. No pericardial effusion seen.  10. Compared to the transthoracic echocardiogram performed on 4/24/2024, there have been no significant interval changes.

## 2024-05-19 NOTE — PROVIDER CONTACT NOTE (CRITICAL VALUE NOTIFICATION) - ACTION/TREATMENT ORDERED:
Provider notified. administered half amp of D50. will recheck 15 min .
1/2 amp d50
MD made aware. MD to order stat lantus and IVF. Monitor q4h FS. Keep pt NPO for now
ABT as ordered,IVF  mlbolus.

## 2024-05-19 NOTE — PROGRESS NOTE ADULT - PROBLEM SELECTOR PLAN 3
Home regimen: lantus 33u qhs w/ premeal sliding scale. A1c 7.4% in 3/2024. multiple hypoglycemic episodes, DKA now resolved, anion gap closed.  Close discussions with endocrine ongoing regarding management.    - endo following, appreciate recs   - Diet resumed  - Holding AM Lantus  - Lantus 10U qHS  - Dosing premeal insulin per endocrine recs  - MDSSI and LDSSI at night  - premeal and bedtime fingersticks  - hypoglycemia protocol. Home regimen: lantus 33u qhs w/ premeal sliding scale. A1c 7.4% in 3/2024. multiple hypoglycemic episodes, DKA now resolved, anion gap closed.  Close discussions with endocrine ongoing regarding management.    - endo following, appreciate recs   - Diet resumed  - Holding AM Lantus  - Lantus 20U qHS  - Dosing premeal insulin per endocrine recs  - MDSSI and LDSSI at night  - premeal and bedtime fingersticks  - hypoglycemia protocol.

## 2024-05-19 NOTE — PROGRESS NOTE ADULT - SUBJECTIVE AND OBJECTIVE BOX
***************************************************************  Jairo Diop, PGY3  Internal Medicine   NS pager: [phone]  [facility] pager: [phone]  ***************************************************************        EVELYN LOPEZ  73y  Female      Patient is a 73y old  Female who presents with a chief complaint of Syncope (19 May 2024 07:29)      INTERVAL HPI/OVERNIGHT EVENTS:       FAMILY HISTORY:  Family history of CVA  mom, age 57      T(C): 36.7 (05-19-24 @ 05:13), Max: 36.7 (05-18-24 @ 11:37)  HR: 76 (05-19-24 @ 05:13) (71 - 79)  BP: 124/65 (05-19-24 @ 05:13) (124/65 - 136/68)  RR: 18 (05-19-24 @ 05:13) (18 - 18)  SpO2: 96% (05-19-24 @ 05:13) (94% - 96%)  Wt(kg): --Vital Signs Last 24 Hrs  T(C): 36.7 (19 May 2024 05:13), Max: 36.7 (18 May 2024 11:37)  T(F): 98 (19 May 2024 05:13), Max: 98.1 (18 May 2024 11:37)  HR: 76 (19 May 2024 05:13) (71 - 79)  BP: 124/65 (19 May 2024 05:13) (124/65 - 136/68)  BP(mean): --  RR: 18 (19 May 2024 05:13) (18 - 18)  SpO2: 96% (19 May 2024 05:13) (94% - 96%)    Parameters below as of 19 May 2024 05:13  Patient On (Oxygen Delivery Method): room air      contrast media (iodine-based) (Fever; Vomiting; Flushing; Hypotension; Rash; Stomach Upset)  Ativan (Rash; Urticaria)  penicillins (Hives (Mod to Severe); Anaphylaxis (Mod to Severe); Short breath (Mod to Severe); Angioedema (Mod to Severe))      PHYSICAL EXAM:  GENERAL: NAD, laying comfortably in bed  HEAD:  Atraumatic, Normocephalic  EYES: EOMI, PERRLA, conjunctiva and sclera clear  ENMT: No tonsillar erythema, exudates, or enlargement; Moist mucous membranes  NECK: Supple, No JVD  NERVOUS SYSTEM:  Alert & Oriented X3, Good concentration; Motor Strength grossly intact in B/L upper and lower extremities;   CHEST/LUNG: Clear to auscultation bilaterally; No rales, rhonchi, wheezing, or rubs  HEART: Regular rate and rhythm; No murmurs, rubs, or gallops  ABDOMEN: Soft, Nontender, Nondistended; Bowel sounds present  EXTREMITIES:  No clubbing, cyanosis, or edema  LYMPH: No lymphadenopathy noted  SKIN: No rashes or lesions        LABS:                            10.0   7.98  )-----------( 219      ( 19 May 2024 06:18 )             33.1       05-19    135  |  99  |  50<H>  ----------------------------<  47<LL>  4.1   |  25  |  2.21<H>    Ca    9.1      19 May 2024 06:18  Phos  3.2     05-19  Mg     2.0     05-19    TPro  6.4  /  Alb  3.0<L>  /  TBili  0.4  /  DBili  x   /  AST  20  /  ALT  12  /  AlkPhos  80  05-19              Urinalysis Basic - ( 19 May 2024 06:18 )    Color: x / Appearance: x / SG: x / pH: x  Gluc: 47 mg/dL / Ketone: x  / Bili: x / Urobili: x   Blood: x / Protein: x / Nitrite: x   Leuk Esterase: x / RBC: x / WBC x   Sq Epi: x / Non Sq Epi: x / Bacteria: x                  CAPILLARY BLOOD GLUCOSE      POCT Blood Glucose.: 74 mg/dL (19 May 2024 07:28)                RADIOLOGY & ADDITIONAL TESTS:      ammonium lactate 12% Lotion 1 Application(s) Topical <User Schedule>  apixaban 10 milliGRAM(s) Oral two times a day  aspirin  chewable 81 milliGRAM(s) Oral daily  atorvastatin 80 milliGRAM(s) Oral at bedtime  calamine/zinc oxide Lotion 1 Application(s) Topical three times a day  dextrose 10% Bolus 125 milliLiter(s) IV Bolus once  dextrose 5%. 1000 milliLiter(s) IV Continuous <Continuous>  dextrose 5%. 1000 milliLiter(s) IV Continuous <Continuous>  dextrose 50% Injectable 12.5 Gram(s) IV Push once  dextrose 50% Injectable 25 Gram(s) IV Push once  dextrose 50% Injectable 12.5 Gram(s) IV Push once  dextrose Oral Gel 15 Gram(s) Oral once PRN  epoetin mendoza-epbx (RETACRIT) Injectable 01225 Unit(s) SubCutaneous every 7 days  ferrous    sulfate 325 milliGRAM(s) Oral two times a day  glucagon  Injectable 1 milliGRAM(s) IntraMuscular once  insulin glargine Injectable (LANTUS) 24 Unit(s) SubCutaneous at bedtime  insulin lispro (ADMELOG) corrective regimen sliding scale   SubCutaneous three times a day before meals  insulin lispro (ADMELOG) corrective regimen sliding scale   SubCutaneous <User Schedule>  insulin lispro Injectable (ADMELOG) 8 Unit(s) SubCutaneous three times a day before meals  lactated ringers. 1000 milliLiter(s) IV Continuous <Continuous>  levothyroxine 75 MICROGram(s) Oral daily  midodrine. 15 milliGRAM(s) Oral three times a day  Nephro-molly 1 Tablet(s) Oral daily  polyethylene glycol 3350 17 Gram(s) Oral two times a day PRN  senna 2 Tablet(s) Oral at bedtime      HEALTH ISSUES - PROBLEM Dx:  Aortic stenosis    Acute kidney injury superimposed on CKD    Lymphedema    Acute respiratory failure with hypoxia    Prophylactic measure    Type 1 diabetes mellitus    Hypothyroidism    Adrenal nodule    Type 1 diabetes mellitus with hypoglycemia    Acute generalized exanthematous pustulosis due to drug    Coag negative Staphylococcus bacteremia    Leukocytosis    Need for prophylactic measure    LELIA (obstructive sleep apnea)    Orthostatic hypotension    HFrEF (heart failure with reduced ejection fraction)    Urinary tract infection    Anemia    Edema of left upper arm    Deep vein thrombosis (DVT) of left upper extremity           ***************************************************************  Jairo Diop, PGY3  Internal Medicine   NS pager: [phone]  [facility] pager: [phone]  ***************************************************************        POLABEATRIZEVELYN  73y  Female      Patient is a 73y old  Female who presents with a chief complaint of Syncope (19 May 2024 07:29)      INTERVAL HPI/OVERNIGHT EVENTS: No acute events overnight. At bedside, pt had no complaints. Denied fever, chills, CP, SOB, nausea, vomiting, diarrhea, constipation, dysuria.       FAMILY HISTORY:  Family history of CVA  mom, age 57      T(C): 36.7 (05-19-24 @ 05:13), Max: 36.7 (05-18-24 @ 11:37)  HR: 76 (05-19-24 @ 05:13) (71 - 79)  BP: 124/65 (05-19-24 @ 05:13) (124/65 - 136/68)  RR: 18 (05-19-24 @ 05:13) (18 - 18)  SpO2: 96% (05-19-24 @ 05:13) (94% - 96%)  Wt(kg): --Vital Signs Last 24 Hrs  T(C): 36.7 (19 May 2024 05:13), Max: 36.7 (18 May 2024 11:37)  T(F): 98 (19 May 2024 05:13), Max: 98.1 (18 May 2024 11:37)  HR: 76 (19 May 2024 05:13) (71 - 79)  BP: 124/65 (19 May 2024 05:13) (124/65 - 136/68)  BP(mean): --  RR: 18 (19 May 2024 05:13) (18 - 18)  SpO2: 96% (19 May 2024 05:13) (94% - 96%)    Parameters below as of 19 May 2024 05:13  Patient On (Oxygen Delivery Method): room air      contrast media (iodine-based) (Fever; Vomiting; Flushing; Hypotension; Rash; Stomach Upset)  Ativan (Rash; Urticaria)  penicillins (Hives (Mod to Severe); Anaphylaxis (Mod to Severe); Short breath (Mod to Severe); Angioedema (Mod to Severe))      PHYSICAL EXAM:  GENERAL: NAD, laying comfortably in bed  HEAD:  Atraumatic, Normocephalic  EYES: EOMI, PERRLA, conjunctiva and sclera clear  ENMT: No tonsillar erythema, exudates, or enlargement; Moist mucous membranes  NECK: Supple, No JVD  NERVOUS SYSTEM:  Alert & Oriented X3, Good concentration; Motor Strength grossly intact in B/L upper and lower extremities;   CHEST/LUNG: Clear to auscultation bilaterally; No rales, rhonchi, wheezing, or rubs  HEART: Regular rate and rhythm; No murmurs, rubs, or gallops  ABDOMEN: Soft, Nontender, Nondistended; Bowel sounds present  EXTREMITIES:  2+ b/l LE edema up to thighs, LUE > RUE swelling  LYMPH: No lymphadenopathy noted  SKIN: No rashes or lesions        LABS:                            10.0   7.98  )-----------( 219      ( 19 May 2024 06:18 )             33.1       05-19    135  |  99  |  50<H>  ----------------------------<  47<LL>  4.1   |  25  |  2.21<H>    Ca    9.1      19 May 2024 06:18  Phos  3.2     05-19  Mg     2.0     05-19    TPro  6.4  /  Alb  3.0<L>  /  TBili  0.4  /  DBili  x   /  AST  20  /  ALT  12  /  AlkPhos  80  05-19              Urinalysis Basic - ( 19 May 2024 06:18 )    Color: x / Appearance: x / SG: x / pH: x  Gluc: 47 mg/dL / Ketone: x  / Bili: x / Urobili: x   Blood: x / Protein: x / Nitrite: x   Leuk Esterase: x / RBC: x / WBC x   Sq Epi: x / Non Sq Epi: x / Bacteria: x                  CAPILLARY BLOOD GLUCOSE      POCT Blood Glucose.: 74 mg/dL (19 May 2024 07:28)                RADIOLOGY & ADDITIONAL TESTS:      ammonium lactate 12% Lotion 1 Application(s) Topical <User Schedule>  apixaban 10 milliGRAM(s) Oral two times a day  aspirin  chewable 81 milliGRAM(s) Oral daily  atorvastatin 80 milliGRAM(s) Oral at bedtime  calamine/zinc oxide Lotion 1 Application(s) Topical three times a day  dextrose 10% Bolus 125 milliLiter(s) IV Bolus once  dextrose 5%. 1000 milliLiter(s) IV Continuous <Continuous>  dextrose 5%. 1000 milliLiter(s) IV Continuous <Continuous>  dextrose 50% Injectable 12.5 Gram(s) IV Push once  dextrose 50% Injectable 25 Gram(s) IV Push once  dextrose 50% Injectable 12.5 Gram(s) IV Push once  dextrose Oral Gel 15 Gram(s) Oral once PRN  epoetin mendoza-epbx (RETACRIT) Injectable 73687 Unit(s) SubCutaneous every 7 days  ferrous    sulfate 325 milliGRAM(s) Oral two times a day  glucagon  Injectable 1 milliGRAM(s) IntraMuscular once  insulin glargine Injectable (LANTUS) 24 Unit(s) SubCutaneous at bedtime  insulin lispro (ADMELOG) corrective regimen sliding scale   SubCutaneous three times a day before meals  insulin lispro (ADMELOG) corrective regimen sliding scale   SubCutaneous <User Schedule>  insulin lispro Injectable (ADMELOG) 8 Unit(s) SubCutaneous three times a day before meals  lactated ringers. 1000 milliLiter(s) IV Continuous <Continuous>  levothyroxine 75 MICROGram(s) Oral daily  midodrine. 15 milliGRAM(s) Oral three times a day  Nephro-molly 1 Tablet(s) Oral daily  polyethylene glycol 3350 17 Gram(s) Oral two times a day PRN  senna 2 Tablet(s) Oral at bedtime      HEALTH ISSUES - PROBLEM Dx:  Aortic stenosis    Acute kidney injury superimposed on CKD    Lymphedema    Acute respiratory failure with hypoxia    Prophylactic measure    Type 1 diabetes mellitus    Hypothyroidism    Adrenal nodule    Type 1 diabetes mellitus with hypoglycemia    Acute generalized exanthematous pustulosis due to drug    Coag negative Staphylococcus bacteremia    Leukocytosis    Need for prophylactic measure    LELIA (obstructive sleep apnea)    Orthostatic hypotension    HFrEF (heart failure with reduced ejection fraction)    Urinary tract infection    Anemia    Edema of left upper arm    Deep vein thrombosis (DVT) of left upper extremity

## 2024-05-19 NOTE — PROVIDER CONTACT NOTE (CRITICAL VALUE NOTIFICATION) - ASSESSMENT
Patient febrile tonightof 102.7F.
Pt A&Ox4 VSS asymptomaticFS 75
pt asymptomatic
Patient A&Ox4. Denies blurry vision. Patient reports feeling tired. Fingerstick over 400 x2., checked at bedside.

## 2024-05-20 NOTE — PROGRESS NOTE ADULT - NSPROGADDITIONALINFOA_GEN_ALL_CORE
-Plan discussed with pt/team.  Contact info: [phone] (24/7). pager [phone]  Amion on [place]  Teams on M-T-W-F. Unavailable Thu/Weekends/Holidays  Assessed pt/labs/meds and discussed plan of care with primary team  Adjusting insulin  Discharge plan  Follow up care

## 2024-05-20 NOTE — PROGRESS NOTE ADULT - SUBJECTIVE AND OBJECTIVE BOX
[facility]   [phone]  RENAL FOLLOW UP NOTE  --------------------------------------------------------------------------------  Chief Complaint:    24 hour events/subjective:    patient seen and examined   denies sob    PAST HISTORY  --------------------------------------------------------------------------------  No significant changes to PMH, PSH, FHx, SHx, unless otherwise noted    ALLERGIES & MEDICATIONS  --------------------------------------------------------------------------------  Allergies    contrast media (iodine-based) (Fever; Vomiting; Flushing; Hypotension; Rash; Stomach Upset)  Ativan (Rash; Urticaria)  penicillins (Hives (Mod to Severe); Anaphylaxis (Mod to Severe); Short breath (Mod to Severe); Angioedema (Mod to Severe))    Intolerances      Standing Inpatient Medications  ammonium lactate 12% Lotion 1 Application(s) Topical <User Schedule>  apixaban 10 milliGRAM(s) Oral two times a day  aspirin  chewable 81 milliGRAM(s) Oral daily  atorvastatin 80 milliGRAM(s) Oral at bedtime  calamine/zinc oxide Lotion 1 Application(s) Topical three times a day  dextrose 10% Bolus 125 milliLiter(s) IV Bolus once  dextrose 5%. 1000 milliLiter(s) IV Continuous <Continuous>  dextrose 5%. 1000 milliLiter(s) IV Continuous <Continuous>  dextrose 50% Injectable 12.5 Gram(s) IV Push once  dextrose 50% Injectable 25 Gram(s) IV Push once  epoetin mendoza-epbx (RETACRIT) Injectable 23915 Unit(s) SubCutaneous every 7 days  ferrous    sulfate 325 milliGRAM(s) Oral two times a day  glucagon  Injectable 1 milliGRAM(s) IntraMuscular once  insulin glargine Injectable (LANTUS) 22 Unit(s) SubCutaneous at bedtime  insulin lispro (ADMELOG) corrective regimen sliding scale   SubCutaneous <User Schedule>  insulin lispro (ADMELOG) corrective regimen sliding scale   SubCutaneous three times a day before meals  insulin lispro Injectable (ADMELOG) 8 Unit(s) SubCutaneous three times a day before meals  levothyroxine 75 MICROGram(s) Oral daily  midodrine. 15 milliGRAM(s) Oral three times a day  Nephro-molly 1 Tablet(s) Oral daily  senna 2 Tablet(s) Oral at bedtime    PRN Inpatient Medications  dextrose Oral Gel 15 Gram(s) Oral once PRN  polyethylene glycol 3350 17 Gram(s) Oral two times a day PRN      REVIEW OF SYSTEMS  --------------------------------------------------------------------------------    Gen: denies fevers/chills,  CVS: denies chest pain/palpitations  Resp: denies SOB/Cough  GI: Denies N/V/Abd pain  : Denies dysuria    VITALS/PHYSICAL EXAM  --------------------------------------------------------------------------------  T(C): 36.3 (05-20-24 @ 12:12), Max: 37.6 (05-20-24 @ 04:02)  HR: 69 (05-20-24 @ 12:12) (69 - 82)  BP: 134/69 (05-20-24 @ 12:12) (122/67 - 134/69)  RR: 18 (05-20-24 @ 12:12) (18 - 18)  SpO2: 97% (05-20-24 @ 12:12) (94% - 97%)  Wt(kg): --        05-19-24 @ 07:01  -  05-20-24 @ 07:00  --------------------------------------------------------  IN: 750 mL / OUT: 1875 mL / NET: -1125 mL    05-20-24 @ 07:01  -  05-20-24 @ 12:54  --------------------------------------------------------  IN: 240 mL / OUT: 400 mL / NET: -160 mL      Physical Exam:  	              Gen: comfortable appearing   	Pulm: decrease bs  no rales or ronchi or wheezing  	CV: No JVD. RRR, S1S2; no rub II/VI M   	Abd: +BS, soft, nontender/nondistended, obese   	UE: Warm, no cyanosis  no clubbing, no edema  	LE: Warm, no cyanosis  no clubbing, 2-  edema,  	Neuro: alert and oriented     LABS/STUDIES  --------------------------------------------------------------------------------              9.9    7.69  >-----------<  220      [05-20-24 @ 07:07]              32.1     137  |  99  |  50  ----------------------------<  281      [05-20-24 @ 07:09]  4.2   |  23  |  2.10        Ca     8.8     [05-20-24 @ 07:09]      Mg     2.1     [05-20-24 @ 07:09]      Phos  3.7     [05-20-24 @ 07:09]    TPro  6.1  /  Alb  2.7  /  TBili  0.5  /  DBili  x   /  AST  19  /  ALT  10  /  AlkPhos  80  [05-20-24 @ 07:09]          Creatinine Trend:  SCr 2.10 [05-20 @ 07:09]  SCr 2.21 [05-19 @ 06:18]  SCr 2.21 [05-18 @ 05:34]  SCr 2.37 [05-17 @ 04:52]  SCr 2.53 [05-16 @ 06:46]              PTH -- (Ca 8.4)      [04-19-24 @ 17:54]   109  TSH 5.06      [04-14-24 @ 07:18]  Lipid: chol 74, TG 70, HDL 33, LDL --      [04-13-24 @ 07:05]

## 2024-05-20 NOTE — PROGRESS NOTE ADULT - PROBLEM SELECTOR PLAN 2
Patient has waxing and waning swelling of left upper extremity during hospitalization, per Catrina.  Noted on exam on 5/16.  DVT Duplex ultrasound Bilateral upper extremities and found to have left axial, brachial, and subclavian DVT on imaging 5/16.  - Begun heparin gtt (5/16), discontinued 5/17  - Begin Eliquis load 10mg BID x 7 days (5/17 - 5/23).  *****************- Initiate Eliquis 5mg BID x 90 days for continued AC (5/24 - ) Patient has waxing and waning swelling of left upper extremity during hospitalization, per Catrina.  Noted on exam on 5/16.  DVT Duplex ultrasound Bilateral upper extremities and found to have left axial, brachial, and subclavian DVT on imaging 5/16.  - Begun heparin gtt (5/16), discontinued 5/17  - Continue Eliquis load 10mg BID x 7 days (5/17 - 5/23).  - Initiate Eliquis 5mg BID x 90 days for continued AC (5/24 - )

## 2024-05-20 NOTE — PROGRESS NOTE ADULT - PROBLEM SELECTOR PLAN 5
Presented for syncope secondary to severe AS, s/p TAVR on 4/24 c/b by VT -> shock -> VFib -> shock.  Course complicated w/ symptomatic bradycardia, requiring TVP, now s/p micra PPM.     - continue aspirin 81mg daily.

## 2024-05-20 NOTE — PROGRESS NOTE ADULT - PROBLEM SELECTOR PLAN 4
DUNCAN secondary to ATN from hypotension and contrast nephropathy. CKD stage 4. s/p CRRT and bumex drip in CICU.  Cr 3.08-> 3.57 -> 3.66 -> 3.48.  [place] Garza for urinary retention (placed 5/10).  Improving and appears stable around 2.2    - f/u nephro recs   - strict I's and O's  - renally dose meds, avoid nephrotoxic agents.

## 2024-05-20 NOTE — PROGRESS NOTE ADULT - ASSESSMENT
73F w/h/o of T1DM with unknown control due to CKD and anemia of chronic disease. DM c/b CKD. Also h/o HFrEF (EF 30%), mod AS, known LBBB, HTN,  hypothyroidism, chronic lymphedema, suspected OHS/LELIA on 3L NC home O2 p/w 1 episode of syncope with R arm jerking, likely 2/2 severe symptomatic AS. S/p AVR 4/24 c/b DUNCAN on CKD, fever and hypotension. Also UTI/pul edema/ sepsis and L adrenal nodule finding. Endocrine consult done for DM and adrenal nodule management. Tolerating POs with hypoglycemic event yesterday morning> Lantus insulin doses decreased but now with severe fasting hyperglycemia this am. Pt denies eating any food overnight or early morning today. Noted low fasting BG while on Lantus 24 and hyperglycemic while on Lantus 20. Will adjust insulin to 22 units and reassess. BG goal 100 to 180s. No hypoglycemia.   Noted creat improved.      Per endocrine attending Dr Burton> Adrenal nodule work up completed and pt will need to f/u once she is more stable as out pt.       Home regimen: Lantus 33 units qhs, Novolog based on sliding scale TIDAC normally 3-5 units  Has Dexcom G7

## 2024-05-20 NOTE — PROGRESS NOTE ADULT - SUBJECTIVE AND OBJECTIVE BOX
[facility] - Howard Kimble, Cherise, Carlos, Alecia, Herman Hernández  Office Number:  [phone]    Patient resting comfortably in bed in NAD.  Laying flat with no respiratory distress.  No complaints of chest pain, dyspnea, palpitations, PND, or orthopnea.  She feels weak.  She is still not walking    F/U for:  CHF      MEDICATIONS  (STANDING):  ammonium lactate 12% Lotion 1 Application(s) Topical <User Schedule>  apixaban 10 milliGRAM(s) Oral two times a day  aspirin  chewable 81 milliGRAM(s) Oral daily  atorvastatin 80 milliGRAM(s) Oral at bedtime  calamine/zinc oxide Lotion 1 Application(s) Topical three times a day  dextrose 10% Bolus 125 milliLiter(s) IV Bolus once  dextrose 5%. 1000 milliLiter(s) (100 mL/Hr) IV Continuous <Continuous>  dextrose 5%. 1000 milliLiter(s) (50 mL/Hr) IV Continuous <Continuous>  dextrose 50% Injectable 12.5 Gram(s) IV Push once  dextrose 50% Injectable 25 Gram(s) IV Push once  epoetin mendoza-epbx (RETACRIT) Injectable 32157 Unit(s) SubCutaneous every 7 days  ferrous    sulfate 325 milliGRAM(s) Oral two times a day  glucagon  Injectable 1 milliGRAM(s) IntraMuscular once  insulin glargine Injectable (LANTUS) 20 Unit(s) SubCutaneous at bedtime  insulin lispro (ADMELOG) corrective regimen sliding scale   SubCutaneous <User Schedule>  insulin lispro (ADMELOG) corrective regimen sliding scale   SubCutaneous three times a day before meals  insulin lispro Injectable (ADMELOG) 8 Unit(s) SubCutaneous three times a day before meals  levothyroxine 75 MICROGram(s) Oral daily  midodrine. 15 milliGRAM(s) Oral three times a day  Nephro-molly 1 Tablet(s) Oral daily  senna 2 Tablet(s) Oral at bedtime    MEDICATIONS  (PRN):  dextrose Oral Gel 15 Gram(s) Oral once PRN Blood Glucose LESS THAN 70 milliGRAM(s)/deciliter  polyethylene glycol 3350 17 Gram(s) Oral two times a day PRN Constipation      Allergies    contrast media (iodine-based) (Fever; Vomiting; Flushing; Hypotension; Rash; Stomach Upset)  Ativan (Rash; Urticaria)  penicillins (Hives (Mod to Severe); Anaphylaxis (Mod to Severe); Short breath (Mod to Severe); Angioedema (Mod to Severe))    Intolerances        Vital Signs Last 24 Hrs  T(C): 37.6 (20 May 2024 04:02), Max: 37.6 (20 May 2024 04:02)  T(F): 99.6 (20 May 2024 04:02), Max: 99.6 (20 May 2024 04:02)  HR: 82 (20 May 2024 04:02) (74 - 82)  BP: 122/67 (20 May 2024 04:02) (122/67 - 147/72)  BP(mean): 97 (19 May 2024 11:09) (97 - 97)  RR: 18 (20 May 2024 04:02) (18 - 18)  SpO2: 94% (20 May 2024 04:02) (94% - 96%)    Parameters below as of 20 May 2024 04:02  Patient On (Oxygen Delivery Method): room air        I&O's Summary    19 May 2024 07:01  -  20 May 2024 07:00  --------------------------------------------------------  IN: 750 mL / OUT: 1875 mL / NET: -1125 mL        ON EXAM:    General: NAD, awake and alert, oriented x 3  HEENT: Mucous membranes are moist, anicteric  Lungs: Non-labored, breath sounds are clear bilaterally, No wheezing, rales or rhonchi  Cardiovascular: Regular, S1 and S2, harsh systolic murmur  Gastrointestinal: Bowel Sounds present, soft, nontender.   Lymph: chronic lymphedema. Wrapped   Skin: No rashes or ulcers  Psych:  Mood & affect appropriate      LABS: All Labs Reviewed:                        9.9    7.69  )-----------( 220      ( 20 May 2024 07:07 )             32.1                         10.0   7.98  )-----------( 219      ( 19 May 2024 06:18 )             33.1                         9.2    9.23  )-----------( 217      ( 18 May 2024 05:34 )             30.8     20 May 2024 07:09    137    |  99     |  50     ----------------------------<  281    4.2     |  23     |  2.10   19 May 2024 06:18    135    |  99     |  50     ----------------------------<  47     4.1     |  25     |  2.21   18 May 2024 05:34    137    |  98     |  56     ----------------------------<  193    4.1     |  28     |  2.21     Ca    8.8        20 May 2024 07:09  Ca    9.1        19 May 2024 06:18  Ca    9.1        18 May 2024 05:34  Phos  3.7       20 May 2024 07:09  Phos  3.2       19 May 2024 06:18  Phos  3.3       18 May 2024 05:34  Mg     2.1       20 May 2024 07:09  Mg     2.0       19 May 2024 06:18  Mg     2.2       18 May 2024 05:34    TPro  6.1    /  Alb  2.7    /  TBili  0.5    /  DBili  x      /  AST  19     /  ALT  10     /  AlkPhos  80     20 May 2024 07:09  TPro  6.4    /  Alb  3.0    /  TBili  0.4    /  DBili  x      /  AST  20     /  ALT  12     /  AlkPhos  80     19 May 2024 06:18  TPro  6.0    /  Alb  2.7    /  TBili  0.5    /  DBili  x      /  AST  18     /  ALT  10     /  AlkPhos  78     18 May 2024 05:34          Blood Culture:

## 2024-05-20 NOTE — PROGRESS NOTE ADULT - ATTENDING COMMENTS
73F PMH: HFrEF (EF 30%), AS, LBBB, HTN, DM1, CKD, hypothyroidism, chronic lymphedema, LELIA (3L home O2) p/w for syncope 2/2 severe AS, s/p TAVR 4/24. Course complicated by contrast induced allergic reaction (had CTA for TAVR eval) and contrast related renal failure (acute on chronic) requiring HD. TAVR c/b VT and vfib requiring shock and flash pulmonary edema causing AHRF requiring intubation.     Course complicated by contrast induced allergic reaction (had CTA for TAVR eval) and contrast related renal failure (acute on chronic) requiring CRRT for fluid removal and pressors for vasoplegia and hypovolemia due to CRRT. Patient extubated, now off pressors, CRRT.       1. Orthostatic hypotension- improved on the lying and sitting numbers. Will need to stand pt today to determine ability to wean off midodrine.  2. Left UE DVT- c/w eliquis  3.T1DM- uncontrolled with 2 episodes of hypoglycemia over the weekend and now with hyperglycemia. Being manged by endocrinology and currently on Lantus 20 and ademolog.  4. DUNCAN on CKD stage 4- cr improving daily  5. HFrEF- currently GDMT is on hold secondary to OH

## 2024-05-20 NOTE — PROGRESS NOTE ADULT - PROBLEM SELECTOR PLAN 1
Likely in setting of deconditioning.  Improving with fluids.  Will attempt orthostatics daily.  Hoping to wean off midodrine in order to restart GDMT.  Orhtostatics improved yesterday and this AM  **********************- F/u PT regarding discharge planning  - leg compression, abdominal binder   - PT rec HONEY   - OOB as tolerated   ****Wean?- midodrine 15mg TID.  - May be volume down  - Fluids PRN. Likely in setting of deconditioning.  Improving with fluids.  Will attempt orthostatics daily.  Hoping to wean off midodrine in order to restart GDMT.  Orhtostatics improved yesterday and this AM  - F/u PT regarding discharge planning  - leg compression, abdominal binder   - PT rec HONEY   - OOB as tolerated   - midodrine 15mg TID.  - Evaluate orthostatics standing today  - May be volume down  - Fluids PRN.

## 2024-05-20 NOTE — PROGRESS NOTE ADULT - SUBJECTIVE AND OBJECTIVE BOX
DIABETES FOLLOW UP NOTE: Saw pt earlier today    Chief Complaint: Endocrine consult requested for management of DM    INTERVAL HX: Pt stable, reports feeling well> just tired. Tolerating POs with BG levels variable and unpredictable> Noted fasting hypoglycemia yesterday by BMP (47)> lantus decreased from 24 units to 20 units last night with FBG 300s today. Pt states she is not eating any food between meals or at night/ early morning.  No hypoglycemic s/sx reported yesterday when BG was low by BMP.  Creat improved and stable.        Review of Systems:  General: As above  Cardiovascular: No chest pain, palpitations  Respiratory: No SOB, no cough  GI: No nausea, vomiting, abdominal pain  Endocrine: No polyuria, polydipsia or S&Sx of hypoglycemia    Allergies    contrast media (iodine-based) (Fever; Vomiting; Flushing; Hypotension; Rash; Stomach Upset)  Ativan (Rash; Urticaria)  penicillins (Hives (Mod to Severe); Anaphylaxis (Mod to Severe); Short breath (Mod to Severe); Angioedema (Mod to Severe))    Intolerances      MEDICATIONS:  atorvastatin 80 milliGRAM(s) Oral at bedtime  insulin glargine Injectable (LANTUS) 22 Unit(s) SubCutaneous at bedtime  insulin lispro (ADMELOG) corrective regimen sliding scale   SubCutaneous three times a day before meals  insulin lispro (ADMELOG) corrective regimen sliding scale   SubCutaneous <User Schedule>  insulin lispro Injectable (ADMELOG) 8 Unit(s) SubCutaneous three times a day before meals  levothyroxine 75 MICROGram(s) Oral daily      PHYSICAL EXAM:  VITALS: T(C): 36.3 (05-20-24 @ 12:12)  T(F): 97.4 (05-20-24 @ 12:12), Max: 99.6 (05-20-24 @ 04:02)  HR: 69 (05-20-24 @ 12:12) (69 - 82)  BP: 134/69 (05-20-24 @ 12:12) (122/67 - 134/69)  RR:  (18 - 18)  SpO2:  (94% - 97%)  Wt(kg): --  GENERAL: Female laying in bed in NAD.   Abdomen: Soft, nontender, non distended. + obesity  Extremities: Warm,+ edema in LEs with ace bandages on D&I.  NEURO: A&O X3    LABS:  POCT Blood Glucose.: 247 mg/dL (05-20-24 @ 13:20)  POCT Blood Glucose.: 306 mg/dL (05-20-24 @ 08:46)  POCT Blood Glucose.: 286 mg/dL (05-20-24 @ 02:13)  POCT Blood Glucose.: 186 mg/dL (05-19-24 @ 21:23)  POCT Blood Glucose.: 295 mg/dL (05-19-24 @ 17:24)  POCT Blood Glucose.: 128 mg/dL (05-19-24 @ 12:34)  POCT Blood Glucose.: 171 mg/dL (05-19-24 @ 08:50)  POCT Blood Glucose.: 137 mg/dL (05-19-24 @ 08:01)  POCT Blood Glucose.: 74 mg/dL (05-19-24 @ 07:28)  POCT Blood Glucose.: 90 mg/dL (05-19-24 @ 02:10)  POCT Blood Glucose.: 112 mg/dL (05-18-24 @ 21:43)  POCT Blood Glucose.: 93 mg/dL (05-18-24 @ 17:24)  POCT Blood Glucose.: 83 mg/dL (05-18-24 @ 12:40)  POCT Blood Glucose.: 202 mg/dL (05-18-24 @ 09:13)  POCT Blood Glucose.: 184 mg/dL (05-18-24 @ 02:01)  POCT Blood Glucose.: 222 mg/dL (05-17-24 @ 21:29)  POCT Blood Glucose.: 162 mg/dL (05-17-24 @ 17:42)                            9.9    7.69  )-----------( 220      ( 20 May 2024 07:07 )             32.1       05-20    137  |  99  |  50<H>  ----------------------------<  281<H>  4.2   |  23  |  2.10<H>    eGFR: 25<L>    Ca    8.8      05-20  Mg     2.1     05-20  Phos  3.7     05-20    TPro  6.1  /  Alb  2.7<L>  /  TBili  0.5  /  DBili  x   /  AST  19  /  ALT  10  /  AlkPhos  80  05-20      eGFR: 25 mL/min/1.73m2 (05-20-24 @ 07:09)        Thyroid Function Tests:      A1C with Estimated Average Glucose Result: 7.4 % (03-30-24 @ 08:21)      Estimated Average Glucose: 166 mg/dL (03-30-24 @ 08:21)        04-13 Chol 74 Direct LDL -- LDL calculated 25 HDL 33<L> Trig 70

## 2024-05-20 NOTE — PROGRESS NOTE ADULT - SUBJECTIVE AND OBJECTIVE BOX
***************************************************************  Jairo Kaufman, PG1  Internal Medicine   Pager:  TEAMS  ***************************************************************    EVELYN LOPEZ  73y  MRN: [id number]    Patient is a 73y old  Female who presents with a chief complaint of Syncope (20 May 2024 08:05)      Interval/Overnight Events: no events ON.     Subjective: Pt seen and examined at bedside. Denies fever, CP, SOB, abn pain, N/V, dysuria. Tolerating diet.      MEDICATIONS  (STANDING):  ammonium lactate 12% Lotion 1 Application(s) Topical <User Schedule>  apixaban 10 milliGRAM(s) Oral two times a day  aspirin  chewable 81 milliGRAM(s) Oral daily  atorvastatin 80 milliGRAM(s) Oral at bedtime  calamine/zinc oxide Lotion 1 Application(s) Topical three times a day  dextrose 10% Bolus 125 milliLiter(s) IV Bolus once  dextrose 5%. 1000 milliLiter(s) (100 mL/Hr) IV Continuous <Continuous>  dextrose 5%. 1000 milliLiter(s) (50 mL/Hr) IV Continuous <Continuous>  dextrose 50% Injectable 12.5 Gram(s) IV Push once  dextrose 50% Injectable 25 Gram(s) IV Push once  epoetin mendoza-epbx (RETACRIT) Injectable 85988 Unit(s) SubCutaneous every 7 days  ferrous    sulfate 325 milliGRAM(s) Oral two times a day  glucagon  Injectable 1 milliGRAM(s) IntraMuscular once  insulin glargine Injectable (LANTUS) 20 Unit(s) SubCutaneous at bedtime  insulin lispro (ADMELOG) corrective regimen sliding scale   SubCutaneous <User Schedule>  insulin lispro (ADMELOG) corrective regimen sliding scale   SubCutaneous three times a day before meals  insulin lispro Injectable (ADMELOG) 8 Unit(s) SubCutaneous three times a day before meals  levothyroxine 75 MICROGram(s) Oral daily  midodrine. 15 milliGRAM(s) Oral three times a day  Nephro-molly 1 Tablet(s) Oral daily  senna 2 Tablet(s) Oral at bedtime    MEDICATIONS  (PRN):  dextrose Oral Gel 15 Gram(s) Oral once PRN Blood Glucose LESS THAN 70 milliGRAM(s)/deciliter  polyethylene glycol 3350 17 Gram(s) Oral two times a day PRN Constipation      Objective:    Vitals: Vital Signs Last 24 Hrs  T(C): 37.6 (05-20-24 @ 04:02), Max: 37.6 (05-20-24 @ 04:02)  T(F): 99.6 (05-20-24 @ 04:02), Max: 99.6 (05-20-24 @ 04:02)  HR: 82 (05-20-24 @ 04:02) (74 - 82)  BP: 122/67 (05-20-24 @ 04:02) (122/67 - 147/72)  BP(mean): 97 (05-19-24 @ 11:09) (97 - 97)  RR: 18 (05-20-24 @ 04:02) (18 - 18)  SpO2: 94% (05-20-24 @ 04:02) (94% - 96%)                I&O's Summary    19 May 2024 07:01  -  20 May 2024 07:00  --------------------------------------------------------  IN: 750 mL / OUT: 1875 mL / NET: -1125 mL        PHYSICAL EXAM:  GENERAL: NAD  HEAD:  Atraumatic, Normocephalic  EYES: EOMI, PERRL, conjunctiva and sclera clear  ENMT: Moist mucous membranes  NECK: Supple  CHEST/LUNG: Clear to auscultation bilaterally; No rales, rhonchi, wheezing, or rubs  HEART: Regular rate and rhythm, 4/6 systolic murmur  ABDOMEN: Soft, Nontender, Nondistended; Bowel sounds present  EXTREMITIES:  2+ Peripheral Pulses, 2+ b/l edema up to thighs, No clubbing, cyanosis.  Left arm swelling improved  SKIN: generalized erythema w/ skin sloughing and desquamation, nonpruritic. Improving  NERVOUS SYSTEM:  Alert, no focal deficits  PSYCH:  Appropriate affect    LABS:                        9.9    7.69  )-----------( 220      ( 20 May 2024 07:07 )             32.1                         10.0   7.98  )-----------( 219      ( 19 May 2024 06:18 )             33.1                         9.2    9.23  )-----------( 217      ( 18 May 2024 05:34 )             30.8     05-20    137  |  99  |  50<H>  ----------------------------<  281<H>  4.2   |  23  |  2.10<H>  05-19    135  |  99  |  50<H>  ----------------------------<  47<LL>  4.1   |  25  |  2.21<H>  05-18    137  |  98  |  56<H>  ----------------------------<  193<H>  4.1   |  28  |  2.21<H>    Ca    8.8      20 May 2024 07:09  Ca    9.1      19 May 2024 06:18  Ca    9.1      18 May 2024 05:34  Phos  3.7     05-20  Mg     2.1     05-20    TPro  6.1  /  Alb  2.7<L>  /  TBili  0.5  /  DBili  x   /  AST  19  /  ALT  10  /  AlkPhos  80  05-20  TPro  6.4  /  Alb  3.0<L>  /  TBili  0.4  /  DBili  x   /  AST  20  /  ALT  12  /  AlkPhos  80  05-19  TPro  6.0  /  Alb  2.7<L>  /  TBili  0.5  /  DBili  x   /  AST  18  /  ALT  10  /  AlkPhos  78  05-18    CAPILLARY BLOOD GLUCOSE      POCT Blood Glucose.: 286 mg/dL (20 May 2024 02:13)  POCT Blood Glucose.: 186 mg/dL (19 May 2024 21:23)  POCT Blood Glucose.: 295 mg/dL (19 May 2024 17:24)  POCT Blood Glucose.: 128 mg/dL (19 May 2024 12:34)  POCT Blood Glucose.: 171 mg/dL (19 May 2024 08:50)        Urinalysis Basic - ( 20 May 2024 07:09 )    Color: x / Appearance: x / SG: x / pH: x  Gluc: 281 mg/dL / Ketone: x  / Bili: x / Urobili: x   Blood: x / Protein: x / Nitrite: x   Leuk Esterase: x / RBC: x / WBC x   Sq Epi: x / Non Sq Epi: x / Bacteria: x          RADIOLOGY & ADDITIONAL TESTS:    Imaging Personally Reviewed:  [x ] YES  [ ] NO    Consultants involved in case:   Consultant(s) Notes Reviewed:  [ x] YES  [ ] NO:   Care Discussed with Consultants/Other Providers [x ] YES  [ ] NO

## 2024-05-20 NOTE — PROGRESS NOTE ADULT - PROBLEM SELECTOR PLAN 1
- Test BG ac and hs/2am  - Adjust Lantus to 22 units at hs. If BG at bedtime <100 please administer 10 units and provide snack. Will re-assess daily   - C/w Admelog dose 8 units TIDAC for now> will adjust as needed  - C/w Low dose admelog correction scale TIDAC, Low dose admelog correction scale QHS and 2am in case of rebound hypo/hyperglycemia  -Will follow  Discharge:  Will be determined according to BG/insulin needs/PO intake  at time of discharge. Basal/bolus insulin doses TBD  - Of note, patient instructed on discharge from recent hospitalization at [place] to start farxiga for CHF. Given risks of DKA of SGLT2i in T1DM, would not start until better data is available for its benefits.  - Follow up with Dr. Luis Dumont outpatient  -Pt will likely required rehab  F/u with optho/renal/cardiac as out pt

## 2024-05-20 NOTE — PROGRESS NOTE ADULT - PROBLEM SELECTOR PLAN 3
Home regimen: lantus 33u qhs w/ premeal sliding scale. A1c 7.4% in 3/2024. multiple hypoglycemic episodes, DKA now resolved, anion gap closed.  Close discussions with endocrine ongoing regarding management.  Hypglycemic in AM twice now since discharge from MICU    - endo following, appreciate recs   ******Evening snack???  - Diet resumed  - Holding AM Lantus  - Lantus 22U qHS  - Dosing premeal insulin per endocrine recs  - MDSSI and LDSSI at night  - premeal and bedtime fingersticks  - hypoglycemia protocol. Home regimen: lantus 33u qhs w/ premeal sliding scale. A1c 7.4% in 3/2024. multiple hypoglycemic episodes, DKA now resolved, anion gap closed.  Close discussions with endocrine ongoing regarding management.  Hypoglycemic in AM twice now since discharge from MICU    - endo following, appreciate recs   - Diet resumed  - Holding AM Lantus  - Lantus 22U qHS  - Dosing premeal insulin per endocrine recs  - MDSSI and LDSSI at night  - premeal and bedtime fingersticks  - hypoglycemia protocol.

## 2024-05-20 NOTE — PROGRESS NOTE ADULT - ASSESSMENT
73F w HFrEF (EF 30%), mod AS, known LBBB, HTN, IDDM1, CKD, hypothyroidism, chronic lymphedema, suspected OHS/LELIA on 3L NC home O2 p/w 1 episode of syncope. Recent admission at [facility] (3/29-4/5) for ADHF and DUNCAN s/p diuresis, discharged with new home O2 3L NC suspecting OHS/LELIA pending outpatient w/u. Since discharge a week ago, she has been staying at home with   Pt had sob walking to car. Once in car, she was still breathing heavily. Partner noticed pt was not responding to verbal stimuli for 10-15sec and witnessed R arm jerking. Pt gained consciousness quickly without postictal symptoms. Pt went to Cardiologist Dr. Nathan who recommended pt to come to ED. No fever, cp, abd pain, n/v. Leg swelling is improved from prior. Pt on torsemide 40mg which she has been taking. Pt was discharged on 3LNC which she is currently on. Patient reports she did not take Farxiga that she was discharged on as she was concerned about side effects.     In ED, patient was found afebrile, hemodynamically stable, breathing well on 3L NC. Initial labs notable for mild hyponatremia, DUNCAN on CKD, elevated proBNP and elevated pCO2 both improved from prior.  pt also noticed with abn creatinine. had severe AS had ct scan with IV contrast had contrast nephropathy and hypotension ---> CRRT required       1- CKD IV  with DUNCAN   2- CHF   3- lymphedema   4- severe AS  s/p tavr 4/24  5- hypotension       creatinine is improving  anemia, trend hgb  retacrit 10,000 units weekly  non-oliguric, off Hd   hardy --->  for urinary retention  orthostatic hypotension, continue midodrine 15 mg tid,  PT check orthostatic bp today  endo following  cont to hold diuretics, but eventually will need them  strict I/O   trend creatinine and electrolytes daily

## 2024-05-20 NOTE — PROGRESS NOTE ADULT - ASSESSMENT
73F w HFrEF (EF 30%), mod AS, known LBBB, HTN, IDDM1, CKD, hypothyroidism, chronic lymphedema, p/w 1 episode of syncope with R arm jerking.     #Syncope-Aortic Stenosis  - Known Aortic Stenosis likely Severe in setting of decreased LVEF  - s/p tavr on 4/24 c/b VT -> shock -> VFib -> shock  - hypoxic/congested, and was intubated, now extubated on 4/25 in the evening, on NC   - on 4/25 with worsening bradycardia, and now s/p TVP placement followed by AV Micra placement with removal of TVP  - Remains in Sinus Rhythm/known lbbb with intermittent   - Vaso has been weaned off with the uptitration of midodrine   - On Midodrine 15mg Q8  - to consider CRT-D in the future  - Cannot initiate GDMT due to blood pressures  - would try to mobilize as best as possible and monitor orthostatics  - cont asa and statin    #Chronic Systolic HF (EF 30%), mod to severe AS, HTN, LBBB, Lymphedema   - Has known systolic HF and AS.    - Repeat TTE showed EF 30%, mild-mod LVH, mildly reduced RVF, mod-severe AS (.61 cmsq), mod pHTN  - On home Torsemide 20 mg daily, Eplerenone 25 mg daily, and Toprol  mg daily, all currently on hold  - Orthostatic, diuretics on hold  - On Midodrine 15mg TID for orthostatic hypotension, now increased to 15mg TID, though BPs remain stable, would attempt to wean midodrine as able, in the hopes of re-initiating some GDMT  - initially CVVHD, then HD  - HD now on hold, as she is urinating and creatinine improving  - prn iv bumex  - renal fu  - Diagnosed with UE DVT and now on full loading dose of Eliquis  - overall improving. needs PT  - will follow with you

## 2024-05-21 NOTE — PROGRESS NOTE ADULT - SUBJECTIVE AND OBJECTIVE BOX
[facility]-- WOUND TEAM -- FOLLOW UP NOTE  --------------------------------------------------------------------------------    24 hour events/subjective:    Afebrile  Tolerating po w/o n/v  tolerated multilayer wrap-now less swelling, using ACE     legs w/ less swelling & less heavy     changed to ACE when pt on pressors and CVVHD  pt off pressors and CVVHD and now out of icu  pt w/ orthostasis and now on tele floor  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  pt had reaction to IV Contrast      Diet:  Diet, Consistent Carbohydrate w/Evening Snack (05-14-24 @ 17:35)      ROS: General/ SKIN/ MSK/ Vasc see HPI  all other systems negative      ALLERGIES & MEDICATIONS  --------------------------------------------------------------------------------  Allergies  contrast media (iodine-based) (Fever; Vomiting; Flushing; Hypotension; Rash; Stomach Upset)  Ativan (Rash; Urticaria)  penicillins (Hives (Mod to Severe); Anaphylaxis (Mod to Severe); Short breath (Mod to Severe); Angioedema (Mod to Severe))        STANDING INPATIENT MEDICATIONS  ammonium lactate 12% Lotion 1 Application(s) Topical <User Schedule>  apixaban 10 milliGRAM(s) Oral two times a day  aspirin  chewable 81 milliGRAM(s) Oral daily  atorvastatin 80 milliGRAM(s) Oral at bedtime  calamine/zinc oxide Lotion 1 Application(s) Topical three times a day  dextrose 10% Bolus 125 milliLiter(s) IV Bolus once  dextrose 5%. 1000 milliLiter(s) IV Continuous <Continuous>  dextrose 5%. 1000 milliLiter(s) IV Continuous <Continuous>  dextrose 50% Injectable 25 Gram(s) IV Push once  dextrose 50% Injectable 12.5 Gram(s) IV Push once  droxidopa 100 milliGRAM(s) Oral every 8 hours  epoetin mendoza-epbx (RETACRIT) Injectable 42689 Unit(s) SubCutaneous every 7 days  ferrous    sulfate 325 milliGRAM(s) Oral two times a day  glucagon  Injectable 1 milliGRAM(s) IntraMuscular once  insulin glargine Injectable (LANTUS) 22 Unit(s) SubCutaneous at bedtime  insulin lispro (ADMELOG) corrective regimen sliding scale   SubCutaneous three times a day before meals  insulin lispro (ADMELOG) corrective regimen sliding scale   SubCutaneous <User Schedule>  insulin lispro Injectable (ADMELOG) 8 Unit(s) SubCutaneous three times a day before meals  levothyroxine 75 MICROGram(s) Oral daily  midodrine. 15 milliGRAM(s) Oral three times a day  Nephro-molly 1 Tablet(s) Oral daily  potassium chloride   Powder 40 milliEquivalent(s) Oral once  senna 2 Tablet(s) Oral at bedtime      PRN INPATIENT MEDICATION  dextrose Oral Gel 15 Gram(s) Oral once PRN  polyethylene glycol 3350 17 Gram(s) Oral two times a day PRN        VITALS/PHYSICAL EXAM  --------------------------------------------------------------------------------  T(C): 36.6 (05-21-24 @ 11:35), Max: 36.8 (05-20-24 @ 21:16)  HR: 71 (05-21-24 @ 11:35) (64 - 71)  BP: 134/66 (05-21-24 @ 11:35) (129/64 - 136/69)  RR: 18 (05-21-24 @ 11:35) (18 - 18)  SpO2: 97% (05-21-24 @ 11:35) (96% - 97%)  Wt(kg): --    Guarded but stable,  A&Ox3, MO  WD/ WN/ WG,    VersaCare P500  HEENT:  NC/AT, EOMI, sclera clear, mucosa moist, throat clear, trachea midline, neck supple  Respiratory: nonlabored w/ equal chest rise  Gastrointestinal: soft NT/ND   Neurology: strength & sensation grossly intact,   Psych: calm/ appropriate  Musculoskeletal: FROM, no deformities/ contractures  Vascular: BLE equally warm,  no cyanosis, clubbing, nor acute ischemia       improved BLE edema equal       BLE DP pulses palpable       BLE hemosiderin staining & lymphedema improving          Lt heel dried blister 2cm x 2cm       No drainage, odor, erythema, increased warmth, tenderness, induration, fluctuance, nor crepitus  Skin: improved dry peeling flakey pale,  scattered psoriatic plaques throughout extremities and torso     gluteal cleft w/ linear area of denuded skin w/ hyperpigmented skin in surrounding buttocks skin  Skin folds of lateral torso w/ faint erythema in creases      w/o blistering  or  drainage  No odor, erythema, increased warmth, tenderness, induration, fluctuance, nor crepitus        LABS/ CULTURES/ RADIOLOGY:              10.0   7.84  >-----------<  234      [05-21-24 @ 07:23]              33.1     137  |  99  |  43  ----------------------------<  172      [05-21-24 @ 07:20]  3.7   |  23  |  2.02        Ca     8.9     [05-21-24 @ 07:20]      Mg     2.0     [05-21-24 @ 07:20]      Phos  3.5     [05-21-24 @ 07:20]    TPro  5.9  /  Alb  2.7  /  TBili  0.4  /  DBili  x   /  AST  21  /  ALT  11  /  AlkPhos  78  [05-21-24 @ 07:20]      CAPILLARY BLOOD GLUCOSE  POCT Blood Glucose.: 198 mg/dL (21 May 2024 12:58)  POCT Blood Glucose.: 185 mg/dL (21 May 2024 09:10)  POCT Blood Glucose.: 168 mg/dL (21 May 2024 02:18)  POCT Blood Glucose.: 170 mg/dL (20 May 2024 21:40)  POCT Blood Glucose.: 216 mg/dL (20 May 2024 17:31)    A1C with Estimated Average Glucose Result: 7.4 % (03-30-24 @ 08:21)  A1C with Estimated Average Glucose Result: 6.8 % (01-10-24 @ 08:37)

## 2024-05-21 NOTE — CHART NOTE - NSCHARTNOTEFT_GEN_A_CORE
73F w/h/o of T1DM with unknown control due to CKD and anemia of chronic disease. DM c/b CKD. Also h/o HFrEF (EF 30%), mod AS, known LBBB, HTN,  hypothyroidism, chronic lymphedema, suspected OHS/LELIA on 3L NC home O2 p/w 1 episode of syncope with R arm jerking, likely 2/2 severe symptomatic AS. S/p AVR 4/24 c/b DUNCAN on CKD, fever and hypotension. Also UTI/pul edema/ sepsis and L adrenal nodule finding. Endocrine consult done for DM and adrenal nodule management. Noted prandial BG high 100s to 200s since yesterday. FBG improved    POCT Blood Glucose.: 228 mg/dL (05-21-24 @ 17:12)  POCT Blood Glucose.: 198 mg/dL (05-21-24 @ 12:58)  POCT Blood Glucose.: 185 mg/dL (05-21-24 @ 09:10)  POCT Blood Glucose.: 168 mg/dL (05-21-24 @ 02:18)  POCT Blood Glucose.: 170 mg/dL (05-20-24 @ 21:40)  POCT Blood Glucose.: 216 mg/dL (05-20-24 @ 17:31)  POCT Blood Glucose.: 247 mg/dL (05-20-24 @ 13:20)  POCT Blood Glucose.: 306 mg/dL (05-20-24 @ 08:46)  POCT Blood Glucose.: 286 mg/dL (05-20-24 @ 02:13)  POCT Blood Glucose.: 186 mg/dL (05-19-24 @ 21:23)      MEDICATIONS:  atorvastatin 80 milliGRAM(s) Oral at bedtime  insulin glargine Injectable (LANTUS) 22 Unit(s) SubCutaneous at bedtime  insulin lispro (ADMELOG) corrective regimen sliding scale   SubCutaneous <User Schedule>  insulin lispro (ADMELOG) corrective regimen sliding scale   SubCutaneous three times a day before meals  insulin lispro Injectable (ADMELOG) 8 Unit(s) SubCutaneous three times a day before meals  levothyroxine 75 MICROGram(s) Oral daily                          10.0   7.84  )-----------( 234      ( 21 May 2024 07:23 )             33.1     05-21    137  |  99  |  43<H>  ----------------------------<  172<H>  3.7   |  23  |  2.02<H>    Ca    8.9      21 May 2024 07:20  Phos  3.5     05-21  Mg     2.0     05-21    TPro  5.9<L>  /  Alb  2.7<L>  /  TBili  0.4  /  DBili  x   /  AST  21  /  ALT  11  /  AlkPhos  78  05-21    PLAN:  - Test BG ac and hs/2am  - C/w Lantus to 22 units at hs. If BG at bedtime <100 please administer 10 units and provide snack. Will re-assess daily   - Change Admelog dose to 10 units TIDAC for now> will adjust as needed  - C/w Low dose admelog correction scale TIDAC, Low dose admelog correction scale QHS and 2am in case of rebound hypo/hyperglycemia  -Will follow  Discharge:  Will be determined according to BG/insulin needs/PO intake  at time of discharge. Basal/bolus insulin doses TBD  - Of note, patient instructed on discharge from recent hospitalization at [facility] to start farxiga for CHF. Given risks of DKA of SGLT2i in T1DM, would not start until better data is available for its benefits.  - Follow up with Dr. Luis Dumont outpatient  -Pt will likely required rehab  F/u with optho/renal/cardiac as out pt.

## 2024-05-21 NOTE — PROGRESS NOTE ADULT - PROBLEM SELECTOR PLAN 1
Likely in setting of deconditioning.  Improving with fluids.  Will attempt orthostatics daily.  Hoping to wean off midodrine in order to restart GDMT.  Orthostatics improving from supine to seated, but still problematic with standing.  On orthostatics 5/20, patient went from /74 seated to 58/39 standing, with heart rate increase from 80 to 116.  - F/u PT regarding discharge planning  - leg compression  - Difficulty with abdominal binder due to body habitus   - PT rec HONEY   - OOB as tolerated   - midodrine 15mg TID.  - Continue to evaluate standing orthostatics  - May be volume down  - Fluids PRN. Likely in setting of deconditioning.  Improving with fluids.  Will attempt orthostatics daily.  Hoping to wean off midodrine in order to restart GDMT.  Orthostatics improving from supine to seated, but still problematic with standing.  On orthostatics 5/20, patient went from /74 seated to 58/39 standing, with heart rate increase from 80 to 116.  - F/u PT regarding discharge planning  - leg compression  - Difficulty with abdominal binder due to body habitus   - PT rec HONEY   - OOB as tolerated   - midodrine 15mg TID.  - Begin droxydopa 100mg TID  - Continue to evaluate standing orthostatics  - May be volume down  - Fluids PRN.

## 2024-05-21 NOTE — PROGRESS NOTE ADULT - NSPROGADDITIONALINFOA_GEN_ALL_CORE
#Adrenal Nodule  Incidental finding on CT C/A/P w/ thickened L adrenal gland and 1.2 x 0.8 cm L adrenal nodule.  Two positive DST, concerning for cushing syndrome. Endo following.  - no indication for txt at this time  - f/u outpatient w/ Dr. Maynor Dumont for repeat testing.    #Need for Prophylactic Measure  DVT ppx: Eliquis  Diet: CC   Dispo: pending clinical course, PT rec HONEY

## 2024-05-21 NOTE — PROGRESS NOTE ADULT - PROBLEM SELECTOR PLAN 3
Home regimen: lantus 33u qhs w/ premeal sliding scale. A1c 7.4% in 3/2024. multiple hypoglycemic episodes, DKA now resolved, anion gap closed.  Close discussions with endocrine ongoing regarding management.  Hypoglycemic in AM twice now [facility]    - endo following, appreciate recs   - Basal insulin per endocrine recs  - Dosing premeal insulin per endocrine recs  - MDSSI and LDSSI at night  - premeal and bedtime fingersticks  - hypoglycemia protocol.

## 2024-05-21 NOTE — PROGRESS NOTE ADULT - SUBJECTIVE AND OBJECTIVE BOX
[facility]   [phone]  RENAL FOLLOW UP NOTE  --------------------------------------------------------------------------------  Chief Complaint:    24 hour events/subjective:    patient seen and examined.   severe orthostatic hypotension yesterday when attempting to stand    PAST HISTORY  --------------------------------------------------------------------------------  No significant changes to PMH, PSH, FHx, SHx, unless otherwise noted    ALLERGIES & MEDICATIONS  --------------------------------------------------------------------------------  Allergies    contrast media (iodine-based) (Fever; Vomiting; Flushing; Hypotension; Rash; Stomach Upset)  Ativan (Rash; Urticaria)  penicillins (Hives (Mod to Severe); Anaphylaxis (Mod to Severe); Short breath (Mod to Severe); Angioedema (Mod to Severe))    Intolerances      Standing Inpatient Medications  ammonium lactate 12% Lotion 1 Application(s) Topical <User Schedule>  apixaban 10 milliGRAM(s) Oral two times a day  aspirin  chewable 81 milliGRAM(s) Oral daily  atorvastatin 80 milliGRAM(s) Oral at bedtime  calamine/zinc oxide Lotion 1 Application(s) Topical three times a day  dextrose 10% Bolus 125 milliLiter(s) IV Bolus once  dextrose 5%. 1000 milliLiter(s) IV Continuous <Continuous>  dextrose 5%. 1000 milliLiter(s) IV Continuous <Continuous>  dextrose 50% Injectable 12.5 Gram(s) IV Push once  dextrose 50% Injectable 25 Gram(s) IV Push once  droxidopa 100 milliGRAM(s) Oral every 8 hours  epoetin mendoza-epbx (RETACRIT) Injectable 26683 Unit(s) SubCutaneous every 7 days  ferrous    sulfate 325 milliGRAM(s) Oral two times a day  glucagon  Injectable 1 milliGRAM(s) IntraMuscular once  insulin glargine Injectable (LANTUS) 22 Unit(s) SubCutaneous at bedtime  insulin lispro (ADMELOG) corrective regimen sliding scale   SubCutaneous three times a day before meals  insulin lispro (ADMELOG) corrective regimen sliding scale   SubCutaneous <User Schedule>  insulin lispro Injectable (ADMELOG) 8 Unit(s) SubCutaneous three times a day before meals  levothyroxine 75 MICROGram(s) Oral daily  midodrine. 15 milliGRAM(s) Oral three times a day  Nephro-molly 1 Tablet(s) Oral daily  potassium chloride   Powder 40 milliEquivalent(s) Oral once  senna 2 Tablet(s) Oral at bedtime    PRN Inpatient Medications  dextrose Oral Gel 15 Gram(s) Oral once PRN  polyethylene glycol 3350 17 Gram(s) Oral two times a day PRN      REVIEW OF SYSTEMS  --------------------------------------------------------------------------------    Gen: denies fevers/chills,  CVS: denies chest pain/palpitations  Resp: denies SOB/Cough  GI: Denies N/V/Abd pain  : Denies dysuria    VITALS/PHYSICAL EXAM  --------------------------------------------------------------------------------  T(C): 36.6 (05-21-24 @ 11:35), Max: 36.8 (05-20-24 @ 21:16)  HR: 71 (05-21-24 @ 11:35) (64 - 71)  BP: 134/66 (05-21-24 @ 11:35) (128/74 - 136/69)  RR: 18 (05-21-24 @ 11:35) (18 - 18)  SpO2: 97% (05-21-24 @ 11:35) (96% - 97%)  Wt(kg): --        05-20-24 @ 07:01  -  05-21-24 @ 07:00  --------------------------------------------------------  IN: 810 mL / OUT: 1800 mL / NET: -990 mL    05-21-24 @ 07:01  -  05-21-24 @ 15:16  --------------------------------------------------------  IN: 0 mL / OUT: 400 mL / NET: -400 mL      Physical Exam:  	              Gen: comfortable appearing   	Pulm: decrease bs  no rales or ronchi or wheezing  	CV: No JVD. RRR, S1S2; no rub II/VI M   	Abd: +BS, soft, nontender/nondistended, obese   	UE: Warm, no cyanosis  no clubbing, no edema  	LE: Warm, no cyanosis  no clubbing, 2-  edema,  	Neuro: alert and oriented     LABS/STUDIES  --------------------------------------------------------------------------------              10.0   7.84  >-----------<  234      [05-21-24 @ 07:23]              33.1     137  |  99  |  43  ----------------------------<  172      [05-21-24 @ 07:20]  3.7   |  23  |  2.02        Ca     8.9     [05-21-24 @ 07:20]      Mg     2.0     [05-21-24 @ 07:20]      Phos  3.5     [05-21-24 @ 07:20]    TPro  5.9  /  Alb  2.7  /  TBili  0.4  /  DBili  x   /  AST  21  /  ALT  11  /  AlkPhos  78  [05-21-24 @ 07:20]          Creatinine Trend:  SCr 2.02 [05-21 @ 07:20]  SCr 2.10 [05-20 @ 07:09]  SCr 2.21 [05-19 @ 06:18]  SCr 2.21 [05-18 @ 05:34]  SCr 2.37 [05-17 @ 04:52]          PTH -- (Ca 8.4)      [04-19-24 @ 17:54]   109  TSH 5.06      [04-14-24 @ 07:18]  Lipid: chol 74, TG 70, HDL 33, LDL --      [04-13-24 @ 07:05]

## 2024-05-21 NOTE — PROGRESS NOTE ADULT - ASSESSMENT
73F w HFrEF (EF 30%), mod AS, known LBBB, HTN, IDDM1, CKD, hypothyroidism, chronic lymphedema, p/w 1 episode of syncope with R arm jerking.     #Syncope-Aortic Stenosis  - Known Aortic Stenosis likely Severe in setting of decreased LVEF  - s/p tavr on 4/24 c/b VT -> shock -> VFib -> shock  - hypoxic/congested, and was intubated, now extubated on 4/25 in the evening, on NC   - on 4/25 with worsening bradycardia, and now s/p TVP placement followed by AV Micra placement with removal of TVP  - Remains in Sinus Rhythm/known lbbb with intermittent   - Vaso has been weaned off with the uptitration of midodrine   - On Midodrine 15mg Q8. BPs remain int eh 120s-130s systolic. Would attempt to wean Midodrine in the hopes of eventual GDMT.   - to consider CRT-D in the future  - Cannot initiate GDMT due to blood pressures  - would try to mobilize as best as possible and monitor orthostatics  - cont asa and statin    #Chronic Systolic HF (EF 30%), mod to severe AS, HTN, LBBB, Lymphedema   - Has known systolic HF and AS.    - Repeat TTE showed EF 30%, mild-mod LVH, mildly reduced RVF, mod-severe AS (.61 cmsq), mod pHTN  - On home Torsemide 20 mg daily, Eplerenone 25 mg daily, and Toprol  mg daily, all currently on hold  - On Midodrine 15mg TID for orthostatic hypotension, though BPs remain stable, would attempt to wean midodrine as able, in the hopes of re-initiating some GDMT  - initially CVVHD, then HD  - HD now on hold, as she is urinating and creatinine remains stable  - prn iv bumex  - renal fu  - Diagnosed with UE DVT and now on full loading dose of Eliquis  - overall improving. needs PT  - will follow with you

## 2024-05-21 NOTE — PROGRESS NOTE ADULT - ASSESSMENT
73F w HFrEF (EF 30%), mod AS, known LBBB, HTN, IDDM1, CKD, hypothyroidism, chronic lymphedema, suspected OHS/LELIA on 3L NC home O2 p/w 1 episode of syncope. Recent admission at [facility] (3/29-4/5) for ADHF and DUNCAN s/p diuresis, discharged with new home O2 3L NC suspecting OHS/LELIA pending outpatient w/u. Since discharge a week ago, she has been staying at home with   Pt had sob walking to car. Once in car, she was still breathing heavily. Partner noticed pt was not responding to verbal stimuli for 10-15sec and witnessed R arm jerking. Pt gained consciousness quickly without postictal symptoms. Pt went to Cardiologist Dr. Nathan who recommended pt to come to ED. No fever, cp, abd pain, n/v. Leg swelling is improved from prior. Pt on torsemide 40mg which she has been taking. Pt was discharged on 3LNC which she is currently on. Patient reports she did not take Farxiga that she was discharged on as she was concerned about side effects.     In ED, patient was found afebrile, hemodynamically stable, breathing well on 3L NC. Initial labs notable for mild hyponatremia, DUNCAN on CKD, elevated proBNP and elevated pCO2 both improved from prior.  pt also noticed with abn creatinine. had severe AS had ct scan with IV contrast had contrast nephropathy and hypotension ---> CRRT required       1- CKD IV  with DUNCAN   2- CHF   3- lymphedema   4- severe AS  s/p tavr 4/24  5- hypotension       creatinine is overall steady in this range  now developing LE edema, ACE bandage is on, she may need to resume diuretics soon   orthostatic hypotension,   continue midodrine 15 mg tid,  northera 100 mg TID added  continue to check orthostatic bp  anemia, trend hgb  retacrit 10,000 units weekly  non-oliguric, off Hd   hardy --->  for urinary retention  endo following  strict I/O   trend creatinine and electrolytes daily

## 2024-05-21 NOTE — PROGRESS NOTE ADULT - PROBLEM SELECTOR PLAN 4
306 DUNCAN secondary to ATN from hypotension and contrast nephropathy. CKD stage 4. s/p CRRT and bumex drip in CICU.  Cr 3.08-> 3.57 -> 3.66 -> 3.48.  De La Garza for urinary retention (placed 5/10).  Improving and appears stable around 2.2    - f/u nephro recs   - strict I's and O's  - renally dose meds, avoid nephrotoxic agents.

## 2024-05-21 NOTE — PROGRESS NOTE ADULT - SUBJECTIVE AND OBJECTIVE BOX
[facility] - Howard Kimble, Carlos Luong, Herman Alston  Office Number:  [phone]    Patient resting comfortably in bed in NAD.  Laying flat with no respiratory distress.  No complaints of chest pain, dyspnea, palpitations, PND, or orthopnea.  On RA this AM  Cr stable    ROS: negative unless otherwise mentioned.    Telemetry: SR first degree    MEDICATIONS  (STANDING):  ammonium lactate 12% Lotion 1 Application(s) Topical <User Schedule>  apixaban 10 milliGRAM(s) Oral two times a day  aspirin  chewable 81 milliGRAM(s) Oral daily  atorvastatin 80 milliGRAM(s) Oral at bedtime  calamine/zinc oxide Lotion 1 Application(s) Topical three times a day  dextrose 10% Bolus 125 milliLiter(s) IV Bolus once  dextrose 5%. 1000 milliLiter(s) (100 mL/Hr) IV Continuous <Continuous>  dextrose 5%. 1000 milliLiter(s) (50 mL/Hr) IV Continuous <Continuous>  dextrose 50% Injectable 12.5 Gram(s) IV Push once  dextrose 50% Injectable 25 Gram(s) IV Push once  epoetin mendoza-epbx (RETACRIT) Injectable 89604 Unit(s) SubCutaneous every 7 days  ferrous    sulfate 325 milliGRAM(s) Oral two times a day  glucagon  Injectable 1 milliGRAM(s) IntraMuscular once  insulin glargine Injectable (LANTUS) 22 Unit(s) SubCutaneous at bedtime  insulin lispro (ADMELOG) corrective regimen sliding scale   SubCutaneous <User Schedule>  insulin lispro (ADMELOG) corrective regimen sliding scale   SubCutaneous three times a day before meals  insulin lispro Injectable (ADMELOG) 8 Unit(s) SubCutaneous three times a day before meals  levothyroxine 75 MICROGram(s) Oral daily  midodrine. 15 milliGRAM(s) Oral three times a day  Nephro-molly 1 Tablet(s) Oral daily  senna 2 Tablet(s) Oral at bedtime    MEDICATIONS  (PRN):  dextrose Oral Gel 15 Gram(s) Oral once PRN Blood Glucose LESS THAN 70 milliGRAM(s)/deciliter  polyethylene glycol 3350 17 Gram(s) Oral two times a day PRN Constipation      Allergies    contrast media (iodine-based) (Fever; Vomiting; Flushing; Hypotension; Rash; Stomach Upset)  Ativan (Rash; Urticaria)  penicillins (Hives (Mod to Severe); Anaphylaxis (Mod to Severe); Short breath (Mod to Severe); Angioedema (Mod to Severe))    Intolerances        Vital Signs Last 24 Hrs  T(C): 36.6 (21 May 2024 04:00), Max: 36.8 (20 May 2024 21:16)  T(F): 97.9 (21 May 2024 04:00), Max: 98.3 (20 May 2024 21:16)  HR: 71 (21 May 2024 04:00) (64 - 71)  BP: 129/64 (21 May 2024 04:00) (128/74 - 136/69)  BP(mean): --  RR: 18 (21 May 2024 04:00) (18 - 18)  SpO2: 96% (21 May 2024 04:00) (96% - 97%)    Parameters below as of 21 May 2024 04:00  Patient On (Oxygen Delivery Method): room air        I&O's Summary    20 May 2024 07:01  -  21 May 2024 07:00  --------------------------------------------------------  IN: 810 mL / OUT: 1800 mL / NET: -990 mL        ON EXAM:    General: NAD, awake and alert, oriented x 3  HEENT: Mucous membranes are moist, anicteric  Lungs: Non-labored, breath sounds are clear bilaterally, No wheezing, rales or rhonchi  Cardiovascular: Regular, S1 and S2, no murmurs, rubs, or gallops  Gastrointestinal: Bowel Sounds present, soft, nontender.   Lymph: No peripheral edema. No lymphadenopathy.  Skin: No rashes or ulcers  Psych:  Mood & affect appropriate    LABS: All Labs Reviewed:                        10.0   7.84  )-----------( 234      ( 21 May 2024 07:23 )             33.1                         9.9    7.69  )-----------( 220      ( 20 May 2024 07:07 )             32.1                         10.0   7.98  )-----------( 219      ( 19 May 2024 06:18 )             33.1     21 May 2024 07:20    137    |  99     |  43     ----------------------------<  172    3.7     |  23     |  2.02   20 May 2024 07:09    137    |  99     |  50     ----------------------------<  281    4.2     |  23     |  2.10   19 May 2024 06:18    135    |  99     |  50     ----------------------------<  47     4.1     |  25     |  2.21     Ca    8.9        21 May 2024 07:20  Ca    8.8        20 May 2024 07:09  Ca    9.1        19 May 2024 06:18  Phos  3.5       21 May 2024 07:20  Phos  3.7       20 May 2024 07:09  Phos  3.2       19 May 2024 06:18  Mg     2.0       21 May 2024 07:20  Mg     2.1       20 May 2024 07:09  Mg     2.0       19 May 2024 06:18    TPro  5.9    /  Alb  2.7    /  TBili  0.4    /  DBili  x      /  AST  21     /  ALT  11     /  AlkPhos  78     21 May 2024 07:20  TPro  6.1    /  Alb  2.7    /  TBili  0.5    /  DBili  x      /  AST  19     /  ALT  10     /  AlkPhos  80     20 May 2024 07:09  TPro  6.4    /  Alb  3.0    /  TBili  0.4    /  DBili  x      /  AST  20     /  ALT  12     /  AlkPhos  80     19 May 2024 06:18          Blood Culture:        [facility] - Howard Kimble, Carlos Luong, Herman Alston  Office Number:  [phone]    Patient resting comfortably in bed in NAD.  Laying flat with no respiratory distress.  No complaints of chest pain, dyspnea, palpitations, PND, or orthopnea.  On RA this AM  Cr stable    ROS: negative unless otherwise mentioned.    Telemetry: SR first degree    MEDICATIONS  (STANDING):  ammonium lactate 12% Lotion 1 Application(s) Topical <User Schedule>  apixaban 10 milliGRAM(s) Oral two times a day  aspirin  chewable 81 milliGRAM(s) Oral daily  atorvastatin 80 milliGRAM(s) Oral at bedtime  calamine/zinc oxide Lotion 1 Application(s) Topical three times a day  dextrose 10% Bolus 125 milliLiter(s) IV Bolus once  dextrose 5%. 1000 milliLiter(s) (100 mL/Hr) IV Continuous <Continuous>  dextrose 5%. 1000 milliLiter(s) (50 mL/Hr) IV Continuous <Continuous>  dextrose 50% Injectable 12.5 Gram(s) IV Push once  dextrose 50% Injectable 25 Gram(s) IV Push once  epoetin mendoza-epbx (RETACRIT) Injectable 85257 Unit(s) SubCutaneous every 7 days  ferrous    sulfate 325 milliGRAM(s) Oral two times a day  glucagon  Injectable 1 milliGRAM(s) IntraMuscular once  insulin glargine Injectable (LANTUS) 22 Unit(s) SubCutaneous at bedtime  insulin lispro (ADMELOG) corrective regimen sliding scale   SubCutaneous <User Schedule>  insulin lispro (ADMELOG) corrective regimen sliding scale   SubCutaneous three times a day before meals  insulin lispro Injectable (ADMELOG) 8 Unit(s) SubCutaneous three times a day before meals  levothyroxine 75 MICROGram(s) Oral daily  midodrine. 15 milliGRAM(s) Oral three times a day  Nephro-molly 1 Tablet(s) Oral daily  senna 2 Tablet(s) Oral at bedtime    MEDICATIONS  (PRN):  dextrose Oral Gel 15 Gram(s) Oral once PRN Blood Glucose LESS THAN 70 milliGRAM(s)/deciliter  polyethylene glycol 3350 17 Gram(s) Oral two times a day PRN Constipation      Allergies    contrast media (iodine-based) (Fever; Vomiting; Flushing; Hypotension; Rash; Stomach Upset)  Ativan (Rash; Urticaria)  penicillins (Hives (Mod to Severe); Anaphylaxis (Mod to Severe); Short breath (Mod to Severe); Angioedema (Mod to Severe))    Intolerances        Vital Signs Last 24 Hrs  T(C): 36.6 (21 May 2024 04:00), Max: 36.8 (20 May 2024 21:16)  T(F): 97.9 (21 May 2024 04:00), Max: 98.3 (20 May 2024 21:16)  HR: 71 (21 May 2024 04:00) (64 - 71)  BP: 129/64 (21 May 2024 04:00) (128/74 - 136/69)  BP(mean): --  RR: 18 (21 May 2024 04:00) (18 - 18)  SpO2: 96% (21 May 2024 04:00) (96% - 97%)    Parameters below as of 21 May 2024 04:00  Patient On (Oxygen Delivery Method): room air        I&O's Summary    20 May 2024 07:01  -  21 May 2024 07:00  --------------------------------------------------------  IN: 810 mL / OUT: 1800 mL / NET: -990 mL        ON EXAM:    General: NAD, awake and alert, oriented x 3  HEENT: Mucous membranes are moist, anicteric  Lungs: Non-labored, breath sounds are clear bilaterally, No wheezing, rales or rhonchi  Cardiovascular: Regular, S1 and S2, 2/6 harsh systolic murmur best heard RUSB  Gastrointestinal: Bowel Sounds present, soft, nontender.   Lymph: No peripheral edema. No lymphadenopathy.  Skin: No rashes or ulcers  Psych:  Mood & affect appropriate    LABS: All Labs Reviewed:                        10.0   7.84  )-----------( 234      ( 21 May 2024 07:23 )             33.1                         9.9    7.69  )-----------( 220      ( 20 May 2024 07:07 )             32.1                         10.0   7.98  )-----------( 219      ( 19 May 2024 06:18 )             33.1     21 May 2024 07:20    137    |  99     |  43     ----------------------------<  172    3.7     |  23     |  2.02   20 May 2024 07:09    137    |  99     |  50     ----------------------------<  281    4.2     |  23     |  2.10   19 May 2024 06:18    135    |  99     |  50     ----------------------------<  47     4.1     |  25     |  2.21     Ca    8.9        21 May 2024 07:20  Ca    8.8        20 May 2024 07:09  Ca    9.1        19 May 2024 06:18  Phos  3.5       21 May 2024 07:20  Phos  3.7       20 May 2024 07:09  Phos  3.2       19 May 2024 06:18  Mg     2.0       21 May 2024 07:20  Mg     2.1       20 May 2024 07:09  Mg     2.0       19 May 2024 06:18    TPro  5.9    /  Alb  2.7    /  TBili  0.4    /  DBili  x      /  AST  21     /  ALT  11     /  AlkPhos  78     21 May 2024 07:20  TPro  6.1    /  Alb  2.7    /  TBili  0.5    /  DBili  x      /  AST  19     /  ALT  10     /  AlkPhos  80     20 May 2024 07:09  TPro  6.4    /  Alb  3.0    /  TBili  0.4    /  DBili  x      /  AST  20     /  ALT  12     /  AlkPhos  80     19 May 2024 06:18          Blood Culture:     TTE 4/25/24:  CONCLUSIONS:      1. Left ventricular cavity is moderately dilated. Left ventricular systolic function is severely decreased with an ejection fraction of 25 % by Nunes's method of disks. Global left ventricular hypokinesis.   2. There is no evidence of a left ventricular thrombus.   3. There is moderate (grade 2) left ventricular diastolic dysfunction, withelevated filling pressure.   4. There is calcification of the mitral valve annulus.   5. A Benitez Bakari (TAVR) valve replacement is present in the aortic position The prosthetic valve is well seated. No paravalvular regurgitation.   6. Moderately enlarged right ventricular cavity size, with normal wall thickness, and normal systolic function.   7. The right atrium is moderately dilated.   8. Left pleural effusion noted.   9. No pericardial effusion seen.  10. Compared to the transthoracic echocardiogram performed on 4/24/2024, there have been no significant interval changes.

## 2024-05-21 NOTE — PROGRESS NOTE ADULT - ATTENDING COMMENTS
73F PMH: HFrEF (EF 30%), AS, LBBB, HTN, DM1, CKD, hypothyroidism, chronic lymphedema, LELIA (3L home O2) p/w for syncope 2/2 severe AS, s/p TAVR 4/24. Course complicated by contrast induced allergic reaction (had CTA for TAVR eval) and contrast related renal failure (acute on chronic) requiring HD. TAVR c/b VT and vfib requiring shock and flash pulmonary edema causing AHRF requiring intubation.     Course complicated by contrast induced allergic reaction (had CTA for TAVR eval) and contrast related renal failure (acute on chronic) requiring CRRT for fluid removal and pressors for vasoplegia and hypovolemia due to CRRT. Patient extubated, now off pressors, CRRT.       1. Orthostatic hypotension- improved on the lying and sitting numbers but sign decrease in BP on standing yesterday. Discussed with cards Dr. Pia Sutton- will add droxydopa to the midodirne.  2. Left UE DVT- c/w eliquis. Arm more swollen today with red, itch rash- will ask derm to eval  3.T1DM- uncontrolled with 2 episodes of hypoglycemia over the weekend and now more stable BS- . Being manged by endocrinology and currently on Lantus and ademolog.  4. DUNCAN on CKD stage 4- cr improving daily  5. HFrEF- currently GDMT is on hold secondary to OH

## 2024-05-21 NOTE — PROGRESS NOTE ADULT - PROBLEM SELECTOR PLAN 2
Patient has waxing and waning swelling of left upper extremity during hospitalization, per Catrina.  Noted on exam on 5/16.  DVT Duplex ultrasound Bilateral upper extremities and found to have left axial, brachial, and subclavian DVT on imaging 5/16.  - Begun heparin gtt (5/16), discontinued 5/17  - Continue Eliquis load 10mg BID x 7 days (5/17 - 5/23).  - Initiate Eliquis 5mg BID x 90 days for continued AC (5/24 - ) Patient has waxing and waning swelling of left upper extremity during hospitalization, per Catrina.  Noted on exam on 5/16.  DVT Duplex ultrasound Bilateral upper extremities and found to have left axial, brachial, and subclavian DVT on imaging 5/16.  Now with pruritic rash  - Begun heparin gtt (5/16), discontinued 5/17  - Continue Eliquis load 10mg BID x 7 days (5/17 - 5/23).  - Initiate Eliquis 5mg BID x 90 days for continued AC (5/24 - )  - Dermatology consult for rash

## 2024-05-21 NOTE — PROGRESS NOTE ADULT - PROBLEM SELECTOR PLAN 8
Likely delayed contrast reaction vs pantoprazole. Derm biopsy consistent w/ agep. s/p nystatin and triamcinolone cream to affected debi. Improving.     - continue to monitor  - calamine cream, vaseline Likely delayed contrast reaction vs pantoprazole. Derm biopsy consistent w/ agep. s/p nystatin and triamcinolone cream to affected debi. Improving. Now with new rash    - continue to monitor  - calamine cream, vaseline  - Derm re-consulted

## 2024-05-21 NOTE — PROGRESS NOTE ADULT - SUBJECTIVE AND OBJECTIVE BOX
***************************************************************  Jairo Kaufman, PG1  Internal Medicine   Pager:  TEAMS  ***************************************************************    EVELYN LOPEZ  73y  MRN: [id number]    Patient is a 73y old  Female who presents with a chief complaint of Syncope (20 May 2024 15:38)      Interval/Overnight Events: no events ON.  On orthostatics, patient went from /74 seated to 58/39 standing, with heart rate increase from 80 to 116.  Symptomatic and did not tolerating standing too long    Subjective: Pt seen and examined at bedside. Denies fever, CP, SOB, abn pain, N/V, dysuria. Tolerating diet.      MEDICATIONS  (STANDING):  ammonium lactate 12% Lotion 1 Application(s) Topical <User Schedule>  apixaban 10 milliGRAM(s) Oral two times a day  aspirin  chewable 81 milliGRAM(s) Oral daily  atorvastatin 80 milliGRAM(s) Oral at bedtime  calamine/zinc oxide Lotion 1 Application(s) Topical three times a day  dextrose 10% Bolus 125 milliLiter(s) IV Bolus once  dextrose 5%. 1000 milliLiter(s) (100 mL/Hr) IV Continuous <Continuous>  dextrose 5%. 1000 milliLiter(s) (50 mL/Hr) IV Continuous <Continuous>  dextrose 50% Injectable 12.5 Gram(s) IV Push once  dextrose 50% Injectable 25 Gram(s) IV Push once  epoetin mendoza-epbx (RETACRIT) Injectable 01320 Unit(s) SubCutaneous every 7 days  ferrous    sulfate 325 milliGRAM(s) Oral two times a day  glucagon  Injectable 1 milliGRAM(s) IntraMuscular once  insulin glargine Injectable (LANTUS) 22 Unit(s) SubCutaneous at bedtime  insulin lispro (ADMELOG) corrective regimen sliding scale   SubCutaneous three times a day before meals  insulin lispro (ADMELOG) corrective regimen sliding scale   SubCutaneous <User Schedule>  insulin lispro Injectable (ADMELOG) 8 Unit(s) SubCutaneous three times a day before meals  levothyroxine 75 MICROGram(s) Oral daily  midodrine. 15 milliGRAM(s) Oral three times a day  Nephro-molly 1 Tablet(s) Oral daily  senna 2 Tablet(s) Oral at bedtime    MEDICATIONS  (PRN):  dextrose Oral Gel 15 Gram(s) Oral once PRN Blood Glucose LESS THAN 70 milliGRAM(s)/deciliter  polyethylene glycol 3350 17 Gram(s) Oral two times a day PRN Constipation      Objective:    Vitals: Vital Signs Last 24 Hrs  T(C): 36.6 (05-21-24 @ 04:00), Max: 36.8 (05-20-24 @ 21:16)  T(F): 97.9 (05-21-24 @ 04:00), Max: 98.3 (05-20-24 @ 21:16)  HR: 71 (05-21-24 @ 04:00) (64 - 71)  BP: 129/64 (05-21-24 @ 04:00) (128/74 - 136/69)  BP(mean): --  RR: 18 (05-21-24 @ 04:00) (18 - 18)  SpO2: 96% (05-21-24 @ 04:00) (96% - 97%)                I&O's Summary    19 May 2024 07:01  -  20 May 2024 07:00  --------------------------------------------------------  IN: 750 mL / OUT: 1875 mL / NET: -1125 mL    20 May 2024 07:01  -  21 May 2024 06:08  --------------------------------------------------------  IN: 810 mL / OUT: 1150 mL / NET: -340 mL        PHYSICAL EXAM:  GENERAL: NAD  HEAD:  Atraumatic, Normocephalic  EYES: EOMI, PERRL, conjunctiva and sclera clear  ENMT: Moist mucous membranes  NECK: Supple  CHEST/LUNG: Clear to auscultation bilaterally; No rales, rhonchi, wheezing, or rubs  HEART: Regular rate and rhythm, 4/6 systolic murmur  ABDOMEN: Soft, Nontender, Nondistended; Bowel sounds present  EXTREMITIES:  2+ Peripheral Pulses, 2+ b/l edema up to thighs, No clubbing, cyanosis.  Left arm swelling improved  SKIN: generalized erythema w/ skin sloughing and desquamation, nonpruritic. Improving  NERVOUS SYSTEM:  Alert, no focal deficits  PSYCH:  Appropriate affect    LABS:                        9.9    7.69  )-----------( 220      ( 20 May 2024 07:07 )             32.1                         10.0   7.98  )-----------( 219      ( 19 May 2024 06:18 )             33.1     05-20    137  |  99  |  50<H>  ----------------------------<  281<H>  4.2   |  23  |  2.10<H>  05-19    135  |  99  |  50<H>  ----------------------------<  47<LL>  4.1   |  25  |  2.21<H>    Ca    8.8      20 May 2024 07:09  Ca    9.1      19 May 2024 06:18  Phos  3.7     05-20  Mg     2.1     05-20    TPro  6.1  /  Alb  2.7<L>  /  TBili  0.5  /  DBili  x   /  AST  19  /  ALT  10  /  AlkPhos  80  05-20  TPro  6.4  /  Alb  3.0<L>  /  TBili  0.4  /  DBili  x   /  AST  20  /  ALT  12  /  AlkPhos  80  05-19    CAPILLARY BLOOD GLUCOSE      POCT Blood Glucose.: 168 mg/dL (21 May 2024 02:18)  POCT Blood Glucose.: 170 mg/dL (20 May 2024 21:40)  POCT Blood Glucose.: 216 mg/dL (20 May 2024 17:31)  POCT Blood Glucose.: 247 mg/dL (20 May 2024 13:20)  POCT Blood Glucose.: 306 mg/dL (20 May 2024 08:46)        Urinalysis Basic - ( 20 May 2024 07:09 )    Color: x / Appearance: x / SG: x / pH: x  Gluc: 281 mg/dL / Ketone: x  / Bili: x / Urobili: x   Blood: x / Protein: x / Nitrite: x   Leuk Esterase: x / RBC: x / WBC x   Sq Epi: x / Non Sq Epi: x / Bacteria: x          RADIOLOGY & ADDITIONAL TESTS:    Imaging Personally Reviewed:  [x ] YES  [ ] NO    Consultants involved in case:   Consultant(s) Notes Reviewed:  [ x] YES  [ ] NO:   Care Discussed with Consultants/Other Providers [x ] YES  [ ] NO         ***************************************************************  Jairo Kaufman, PG1  Internal Medicine   Pager:  TEAMS  ***************************************************************    EVELYN LOPEZ  73y  MRN: [id number]    Patient is a 73y old  Female who presents with a chief complaint of Syncope (20 May 2024 15:38)      Interval/Overnight Events: no events ON.  On orthostatics, patient went from /74 seated to 58/39 standing, with heart rate increase from 80 to 116.  Symptomatic and did not tolerating standing too long    Subjective: Pt seen and examined at bedside. Denies fever, CP, SOB, abn pain, N/V, dysuria. Tolerating diet.      MEDICATIONS  (STANDING):  ammonium lactate 12% Lotion 1 Application(s) Topical <User Schedule>  apixaban 10 milliGRAM(s) Oral two times a day  aspirin  chewable 81 milliGRAM(s) Oral daily  atorvastatin 80 milliGRAM(s) Oral at bedtime  calamine/zinc oxide Lotion 1 Application(s) Topical three times a day  dextrose 10% Bolus 125 milliLiter(s) IV Bolus once  dextrose 5%. 1000 milliLiter(s) (100 mL/Hr) IV Continuous <Continuous>  dextrose 5%. 1000 milliLiter(s) (50 mL/Hr) IV Continuous <Continuous>  dextrose 50% Injectable 12.5 Gram(s) IV Push once  dextrose 50% Injectable 25 Gram(s) IV Push once  epoetin mendoza-epbx (RETACRIT) Injectable 60921 Unit(s) SubCutaneous every 7 days  ferrous    sulfate 325 milliGRAM(s) Oral two times a day  glucagon  Injectable 1 milliGRAM(s) IntraMuscular once  insulin glargine Injectable (LANTUS) 22 Unit(s) SubCutaneous at bedtime  insulin lispro (ADMELOG) corrective regimen sliding scale   SubCutaneous three times a day before meals  insulin lispro (ADMELOG) corrective regimen sliding scale   SubCutaneous <User Schedule>  insulin lispro Injectable (ADMELOG) 8 Unit(s) SubCutaneous three times a day before meals  levothyroxine 75 MICROGram(s) Oral daily  midodrine. 15 milliGRAM(s) Oral three times a day  Nephro-molly 1 Tablet(s) Oral daily  senna 2 Tablet(s) Oral at bedtime    MEDICATIONS  (PRN):  dextrose Oral Gel 15 Gram(s) Oral once PRN Blood Glucose LESS THAN 70 milliGRAM(s)/deciliter  polyethylene glycol 3350 17 Gram(s) Oral two times a day PRN Constipation      Objective:    Vitals: Vital Signs Last 24 Hrs  T(C): 36.6 (05-21-24 @ 04:00), Max: 36.8 (05-20-24 @ 21:16)  T(F): 97.9 (05-21-24 @ 04:00), Max: 98.3 (05-20-24 @ 21:16)  HR: 71 (05-21-24 @ 04:00) (64 - 71)  BP: 129/64 (05-21-24 @ 04:00) (128/74 - 136/69)  BP(mean): --  RR: 18 (05-21-24 @ 04:00) (18 - 18)  SpO2: 96% (05-21-24 @ 04:00) (96% - 97%)                I&O's Summary    19 May 2024 07:01  -  20 May 2024 07:00  --------------------------------------------------------  IN: 750 mL / OUT: 1875 mL / NET: -1125 mL    20 May 2024 07:01  -  21 May 2024 06:08  --------------------------------------------------------  IN: 810 mL / OUT: 1150 mL / NET: -340 mL        PHYSICAL EXAM:  GENERAL: NAD  HEAD:  Atraumatic, Normocephalic  EYES: EOMI, PERRL, conjunctiva and sclera clear  ENMT: Moist mucous membranes  NECK: Supple  CHEST/LUNG: Clear to auscultation bilaterally; No rales, rhonchi, wheezing, or rubs  HEART: Regular rate and rhythm, 4/6 systolic murmur  ABDOMEN: Soft, Nontender, Nondistended; Bowel sounds present  EXTREMITIES:  2+ Peripheral Pulses, 2+ b/l edema up to thighs, No clubbing, cyanosis.  Left arm swelling returned with rash  SKIN: generalized erythema w/ skin sloughing and desquamation, nonpruritic. Improving  NERVOUS SYSTEM:  Alert, no focal deficits  PSYCH:  Appropriate affect    LABS:                        9.9    7.69  )-----------( 220      ( 20 May 2024 07:07 )             32.1                         10.0   7.98  )-----------( 219      ( 19 May 2024 06:18 )             33.1     05-20    137  |  99  |  50<H>  ----------------------------<  281<H>  4.2   |  23  |  2.10<H>  05-19    135  |  99  |  50<H>  ----------------------------<  47<LL>  4.1   |  25  |  2.21<H>    Ca    8.8      20 May 2024 07:09  Ca    9.1      19 May 2024 06:18  Phos  3.7     05-20  Mg     2.1     05-20    TPro  6.1  /  Alb  2.7<L>  /  TBili  0.5  /  DBili  x   /  AST  19  /  ALT  10  /  AlkPhos  80  05-20  TPro  6.4  /  Alb  3.0<L>  /  TBili  0.4  /  DBili  x   /  AST  20  /  ALT  12  /  AlkPhos  80  05-19    CAPILLARY BLOOD GLUCOSE      POCT Blood Glucose.: 168 mg/dL (21 May 2024 02:18)  POCT Blood Glucose.: 170 mg/dL (20 May 2024 21:40)  POCT Blood Glucose.: 216 mg/dL (20 May 2024 17:31)  POCT Blood Glucose.: 247 mg/dL (20 May 2024 13:20)  POCT Blood Glucose.: 306 mg/dL (20 May 2024 08:46)        Urinalysis Basic - ( 20 May 2024 07:09 )    Color: x / Appearance: x / SG: x / pH: x  Gluc: 281 mg/dL / Ketone: x  / Bili: x / Urobili: x   Blood: x / Protein: x / Nitrite: x   Leuk Esterase: x / RBC: x / WBC x   Sq Epi: x / Non Sq Epi: x / Bacteria: x          RADIOLOGY & ADDITIONAL TESTS:    Imaging Personally Reviewed:  [x ] YES  [ ] NO    Consultants involved in case:   Consultant(s) Notes Reviewed:  [ x] YES  [ ] NO:   Care Discussed with Consultants/Other Providers [x ] YES  [ ] NO         ***************************************************************  Jairo Kaufman, PG1  Internal Medicine   Pager:  TEAMS  ***************************************************************    EVELYN LOPEZ  73y  MRN: [id number]    Patient is a 73y old  Female who presents with a chief complaint of Syncope (20 May 2024 15:38)      Interval/Overnight Events: no events ON.  On orthostatics, patient went from /74 seated to 58/39 standing, with heart rate increase from 80 to 116.  Symptomatic and did not tolerating standing too long    Subjective: Pt seen and examined at bedside. Denies fever, CP, SOB, abn pain, N/V, dysuria. Tolerating diet.      MEDICATIONS  (STANDING):  ammonium lactate 12% Lotion 1 Application(s) Topical <User Schedule>  apixaban 10 milliGRAM(s) Oral two times a day  aspirin  chewable 81 milliGRAM(s) Oral daily  atorvastatin 80 milliGRAM(s) Oral at bedtime  calamine/zinc oxide Lotion 1 Application(s) Topical three times a day  dextrose 10% Bolus 125 milliLiter(s) IV Bolus once  dextrose 5%. 1000 milliLiter(s) (100 mL/Hr) IV Continuous <Continuous>  dextrose 5%. 1000 milliLiter(s) (50 mL/Hr) IV Continuous <Continuous>  dextrose 50% Injectable 12.5 Gram(s) IV Push once  dextrose 50% Injectable 25 Gram(s) IV Push once  epoetin mendoza-epbx (RETACRIT) Injectable 68157 Unit(s) SubCutaneous every 7 days  ferrous    sulfate 325 milliGRAM(s) Oral two times a day  glucagon  Injectable 1 milliGRAM(s) IntraMuscular once  insulin glargine Injectable (LANTUS) 22 Unit(s) SubCutaneous at bedtime  insulin lispro (ADMELOG) corrective regimen sliding scale   SubCutaneous three times a day before meals  insulin lispro (ADMELOG) corrective regimen sliding scale   SubCutaneous <User Schedule>  insulin lispro Injectable (ADMELOG) 8 Unit(s) SubCutaneous three times a day before meals  levothyroxine 75 MICROGram(s) Oral daily  midodrine. 15 milliGRAM(s) Oral three times a day  Nephro-molly 1 Tablet(s) Oral daily  senna 2 Tablet(s) Oral at bedtime    MEDICATIONS  (PRN):  dextrose Oral Gel 15 Gram(s) Oral once PRN Blood Glucose LESS THAN 70 milliGRAM(s)/deciliter  polyethylene glycol 3350 17 Gram(s) Oral two times a day PRN Constipation      Objective:    Vitals: Vital Signs Last 24 Hrs  T(C): 36.6 (05-21-24 @ 04:00), Max: 36.8 (05-20-24 @ 21:16)  T(F): 97.9 (05-21-24 @ 04:00), Max: 98.3 (05-20-24 @ 21:16)  HR: 71 (05-21-24 @ 04:00) (64 - 71)  BP: 129/64 (05-21-24 @ 04:00) (128/74 - 136/69)  BP(mean): --  RR: 18 (05-21-24 @ 04:00) (18 - 18)  SpO2: 96% (05-21-24 @ 04:00) (96% - 97%)                I&O's Summary    19 May 2024 07:01  -  20 May 2024 07:00  --------------------------------------------------------  IN: 750 mL / OUT: 1875 mL / NET: -1125 mL    20 May 2024 07:01  -  21 May 2024 06:08  --------------------------------------------------------  IN: 810 mL / OUT: 1150 mL / NET: -340 mL        PHYSICAL EXAM:  GENERAL: NAD  HEAD:  Atraumatic, Normocephalic  EYES: EOMI, PERRL, conjunctiva and sclera clear  ENMT: Moist mucous membranes  NECK: Supple  CHEST/LUNG: Clear to auscultation bilaterally; No rales, rhonchi, wheezing, or rubs  HEART: Regular rate and rhythm, 4/6 systolic murmur  ABDOMEN: Soft, Nontender, Nondistended; Bowel sounds present  EXTREMITIES:  2+ Peripheral Pulses, 2+ b/l edema up to thighs, No clubbing, cyanosis.  Left arm swelling returned  SKIN: generalized rash resolved, patient now with pruritic erythemic macular rash on LUE  NERVOUS SYSTEM:  Alert, no focal deficits  PSYCH:  Appropriate affect    LABS:                        9.9    7.69  )-----------( 220      ( 20 May 2024 07:07 )             32.1                         10.0   7.98  )-----------( 219      ( 19 May 2024 06:18 )             33.1     05-20    137  |  99  |  50<H>  ----------------------------<  281<H>  4.2   |  23  |  2.10<H>  05-19    135  |  99  |  50<H>  ----------------------------<  47<LL>  4.1   |  25  |  2.21<H>    Ca    8.8      20 May 2024 07:09  Ca    9.1      19 May 2024 06:18  Phos  3.7     05-20  Mg     2.1     05-20    TPro  6.1  /  Alb  2.7<L>  /  TBili  0.5  /  DBili  x   /  AST  19  /  ALT  10  /  AlkPhos  80  05-20  TPro  6.4  /  Alb  3.0<L>  /  TBili  0.4  /  DBili  x   /  AST  20  /  ALT  12  /  AlkPhos  80  05-19    CAPILLARY BLOOD GLUCOSE      POCT Blood Glucose.: 168 mg/dL (21 May 2024 02:18)  POCT Blood Glucose.: 170 mg/dL (20 May 2024 21:40)  POCT Blood Glucose.: 216 mg/dL (20 May 2024 17:31)  POCT Blood Glucose.: 247 mg/dL (20 May 2024 13:20)  POCT Blood Glucose.: 306 mg/dL (20 May 2024 08:46)        Urinalysis Basic - ( 20 May 2024 07:09 )    Color: x / Appearance: x / SG: x / pH: x  Gluc: 281 mg/dL / Ketone: x  / Bili: x / Urobili: x   Blood: x / Protein: x / Nitrite: x   Leuk Esterase: x / RBC: x / WBC x   Sq Epi: x / Non Sq Epi: x / Bacteria: x          RADIOLOGY & ADDITIONAL TESTS:    Imaging Personally Reviewed:  [x ] YES  [ ] NO    Consultants involved in case:   Consultant(s) Notes Reviewed:  [ x] YES  [ ] NO:   Care Discussed with Consultants/Other Providers [x ] YES  [ ] NO         ***************************************************************  Jairo Kaufman, PG1  Internal Medicine   Pager:  TEAMS  ***************************************************************    EVELYN LOPEZ  73y  MRN: [id number]    Patient is a 73y old  Female who presents with a chief complaint of Syncope (20 May 2024 15:38)      Interval/Overnight Events: no events ON.  On orthostatics, patient went from /74 seated to 58/39 standing, with heart rate increase from 80 to 116.  Symptomatic and did not tolerating standing too long    Subjective: Pt seen and examined at bedside. Feels well today.  Tolerating diet.  Symptomatic hypotension yesterday    MEDICATIONS  (STANDING):  ammonium lactate 12% Lotion 1 Application(s) Topical <User Schedule>  apixaban 10 milliGRAM(s) Oral two times a day  aspirin  chewable 81 milliGRAM(s) Oral daily  atorvastatin 80 milliGRAM(s) Oral at bedtime  calamine/zinc oxide Lotion 1 Application(s) Topical three times a day  dextrose 10% Bolus 125 milliLiter(s) IV Bolus once  dextrose 5%. 1000 milliLiter(s) (100 mL/Hr) IV Continuous <Continuous>  dextrose 5%. 1000 milliLiter(s) (50 mL/Hr) IV Continuous <Continuous>  dextrose 50% Injectable 12.5 Gram(s) IV Push once  dextrose 50% Injectable 25 Gram(s) IV Push once  epoetin mendoza-epbx (RETACRIT) Injectable 11468 Unit(s) SubCutaneous every 7 days  ferrous    sulfate 325 milliGRAM(s) Oral two times a day  glucagon  Injectable 1 milliGRAM(s) IntraMuscular once  insulin glargine Injectable (LANTUS) 22 Unit(s) SubCutaneous at bedtime  insulin lispro (ADMELOG) corrective regimen sliding scale   SubCutaneous three times a day before meals  insulin lispro (ADMELOG) corrective regimen sliding scale   SubCutaneous <User Schedule>  insulin lispro Injectable (ADMELOG) 8 Unit(s) SubCutaneous three times a day before meals  levothyroxine 75 MICROGram(s) Oral daily  midodrine. 15 milliGRAM(s) Oral three times a day  Nephro-molly 1 Tablet(s) Oral daily  senna 2 Tablet(s) Oral at bedtime    MEDICATIONS  (PRN):  dextrose Oral Gel 15 Gram(s) Oral once PRN Blood Glucose LESS THAN 70 milliGRAM(s)/deciliter  polyethylene glycol 3350 17 Gram(s) Oral two times a day PRN Constipation      Objective:    Vitals: Vital Signs Last 24 Hrs  T(C): 36.6 (05-21-24 @ 04:00), Max: 36.8 (05-20-24 @ 21:16)  T(F): 97.9 (05-21-24 @ 04:00), Max: 98.3 (05-20-24 @ 21:16)  HR: 71 (05-21-24 @ 04:00) (64 - 71)  BP: 129/64 (05-21-24 @ 04:00) (128/74 - 136/69)  BP(mean): --  RR: 18 (05-21-24 @ 04:00) (18 - 18)  SpO2: 96% (05-21-24 @ 04:00) (96% - 97%)                I&O's Summary    19 May 2024 07:01  -  20 May 2024 07:00  --------------------------------------------------------  IN: 750 mL / OUT: 1875 mL / NET: -1125 mL    20 May 2024 07:01  -  21 May 2024 06:08  --------------------------------------------------------  IN: 810 mL / OUT: 1150 mL / NET: -340 mL        PHYSICAL EXAM:  GENERAL: NAD  HEAD:  Atraumatic, Normocephalic  EYES: EOMI, PERRL, conjunctiva and sclera clear  ENMT: Moist mucous membranes  NECK: Supple  CHEST/LUNG: Clear to auscultation bilaterally; No rales, rhonchi, wheezing, or rubs  HEART: Regular rate and rhythm, 4/6 systolic murmur  ABDOMEN: Soft, Nontender, Nondistended; Bowel sounds present  EXTREMITIES:  2+ Peripheral Pulses, 2+ b/l edema up to thighs, No clubbing, cyanosis.  Left arm swelling returned  SKIN: generalized rash resolved, patient now with pruritic erythemic macular rash on LUE  NERVOUS SYSTEM:  Alert, no focal deficits  PSYCH:  Appropriate affect    LABS:                        9.9    7.69  )-----------( 220      ( 20 May 2024 07:07 )             32.1                         10.0   7.98  )-----------( 219      ( 19 May 2024 06:18 )             33.1     05-20    137  |  99  |  50<H>  ----------------------------<  281<H>  4.2   |  23  |  2.10<H>  05-19    135  |  99  |  50<H>  ----------------------------<  47<LL>  4.1   |  25  |  2.21<H>    Ca    8.8      20 May 2024 07:09  Ca    9.1      19 May 2024 06:18  Phos  3.7     05-20  Mg     2.1     05-20    TPro  6.1  /  Alb  2.7<L>  /  TBili  0.5  /  DBili  x   /  AST  19  /  ALT  10  /  AlkPhos  80  05-20  TPro  6.4  /  Alb  3.0<L>  /  TBili  0.4  /  DBili  x   /  AST  20  /  ALT  12  /  AlkPhos  80  05-19    CAPILLARY BLOOD GLUCOSE      POCT Blood Glucose.: 168 mg/dL (21 May 2024 02:18)  POCT Blood Glucose.: 170 mg/dL (20 May 2024 21:40)  POCT Blood Glucose.: 216 mg/dL (20 May 2024 17:31)  POCT Blood Glucose.: 247 mg/dL (20 May 2024 13:20)  POCT Blood Glucose.: 306 mg/dL (20 May 2024 08:46)        Urinalysis Basic - ( 20 May 2024 07:09 )    Color: x / Appearance: x / SG: x / pH: x  Gluc: 281 mg/dL / Ketone: x  / Bili: x / Urobili: x   Blood: x / Protein: x / Nitrite: x   Leuk Esterase: x / RBC: x / WBC x   Sq Epi: x / Non Sq Epi: x / Bacteria: x          RADIOLOGY & ADDITIONAL TESTS:    Imaging Personally Reviewed:  [x ] YES  [ ] NO    Consultants involved in case:   Consultant(s) Notes Reviewed:  [ x] YES  [ ] NO:   Care Discussed with Consultants/Other Providers [x ] YES  [ ] NO

## 2024-05-21 NOTE — PROGRESS NOTE ADULT - ASSESSMENT
73F w HFrEF (EF 30%), mod AS, known LBBB, HTN, IDDM1, CKD, hypothyroidism, chronic lymphedema, p/w 1 episode of syncope with R arm jerking, likely 2/2 severe symptomatic AS. Pending CTA imagings for TAVR eval, c/b DUNCAN on CKD, on bumex gtt.      Wound Consult requested to assist w/ management of Psoriasis  Drug reaction  BLE Lymphedema w/ wounds  Incontinence Dermatitis Of buttocks     BLE LAc Hydrin to intact skin + Adaptic dressing QD     will continue to monitor as pt on pressor & CVVHD  Podiatry following foot wounds  Appreciate Derm Consult for Psoriasis & Drug reaction  BLE elevation & Compression  Moisturize intact skin w/ SWEEN cream BID  Nutrition Consult for optimization        encourage high quality protein, cynthia/ prosource, MVI & Vit C to promote wound healing  Hyperglycemia -  ADA diet and NPH & FS w/ ISS  Continue turning and positioning w/ offloading assistive devices as per protocol  Buttocks/ Sacrum Patito BID and prn soiling        Continue w/ attends under pads and Pericare as per protocol  Waffle Cushion to chair when oob to chair  Continue w/ low air loss pressure redistribution bed surface   Care as per CICU/medicine, will follow w/ you  Upon discharge f/u as outpatient at [facility] [address] [facility] [phone]  S/w Wound PT and D/w team & RN & attng  Staci Lau PA-C [id number]  Nights/ Weekends/ Holidays please call:  General Surgery Consult pager ([phone]) for emergencies  Wound PT for multilayer leg wrapping or VAC issues ([phone])   I spent 35minutes face to face w/ this pt of which more than 50% of the time was spent counseling & coordinating care of this pt.

## 2024-05-22 NOTE — PROGRESS NOTE ADULT - PROBLEM SELECTOR PLAN 9
TSH mildly elevated 4.79 - 5.06, FT4 1.3-1.5    - continue home levothyroxine 75mcg qd   - Recheck TSH and FT4 outpatient outpatient. TSH mildly elevated 4.79 - 5.06, FT4 1.3-1.5    - continue home levothyroxine 75mcg qd   - Recheck TSH and FT4 outpatient

## 2024-05-22 NOTE — PROGRESS NOTE ADULT - SUBJECTIVE AND OBJECTIVE BOX
[facility] - Howard Kimble, Carlos Luong, Herman Alston  Office Number:  [phone]    Patient resting comfortably in bed in NAD.  Laying flat with no respiratory distress.  No complaints of chest pain, dyspnea, palpitations, PND, or orthopnea.  Cr remains stable in the 2's    ROS: negative unless otherwise mentioned.    Telemetry: SR first degree    MEDICATIONS  (STANDING):  ammonium lactate 12% Lotion 1 Application(s) Topical <User Schedule>  apixaban 10 milliGRAM(s) Oral two times a day  aspirin  chewable 81 milliGRAM(s) Oral daily  atorvastatin 80 milliGRAM(s) Oral at bedtime  calamine/zinc oxide Lotion 1 Application(s) Topical three times a day  dextrose 10% Bolus 125 milliLiter(s) IV Bolus once  dextrose 5%. 1000 milliLiter(s) (100 mL/Hr) IV Continuous <Continuous>  dextrose 5%. 1000 milliLiter(s) (50 mL/Hr) IV Continuous <Continuous>  dextrose 50% Injectable 12.5 Gram(s) IV Push once  dextrose 50% Injectable 25 Gram(s) IV Push once  droxidopa 100 milliGRAM(s) Oral every 8 hours  epoetin mendoza-epbx (RETACRIT) Injectable 69130 Unit(s) SubCutaneous every 7 days  ferrous    sulfate 325 milliGRAM(s) Oral two times a day  glucagon  Injectable 1 milliGRAM(s) IntraMuscular once  insulin glargine Injectable (LANTUS) 22 Unit(s) SubCutaneous at bedtime  insulin lispro (ADMELOG) corrective regimen sliding scale   SubCutaneous <User Schedule>  insulin lispro (ADMELOG) corrective regimen sliding scale   SubCutaneous three times a day before meals  insulin lispro Injectable (ADMELOG) 10 Unit(s) SubCutaneous three times a day before meals  levothyroxine 75 MICROGram(s) Oral daily  midodrine. 15 milliGRAM(s) Oral three times a day  Nephro-molly 1 Tablet(s) Oral daily  senna 2 Tablet(s) Oral at bedtime    MEDICATIONS  (PRN):  dextrose Oral Gel 15 Gram(s) Oral once PRN Blood Glucose LESS THAN 70 milliGRAM(s)/deciliter  polyethylene glycol 3350 17 Gram(s) Oral two times a day PRN Constipation      Allergies    contrast media (iodine-based) (Fever; Vomiting; Flushing; Hypotension; Rash; Stomach Upset)  Ativan (Rash; Urticaria)  penicillins (Hives (Mod to Severe); Anaphylaxis (Mod to Severe); Short breath (Mod to Severe); Angioedema (Mod to Severe))    Intolerances        Vital Signs Last 24 Hrs  T(C): 36.9 (22 May 2024 04:02), Max: 36.9 (22 May 2024 04:02)  T(F): 98.4 (22 May 2024 04:02), Max: 98.4 (22 May 2024 04:02)  HR: 77 (22 May 2024 04:02) (71 - 77)  BP: 119/68 (22 May 2024 04:02) (119/68 - 134/66)  BP(mean): --  RR: 18 (22 May 2024 04:02) (18 - 18)  SpO2: 96% (22 May 2024 04:02) (96% - 99%)    Parameters below as of 22 May 2024 04:02  Patient On (Oxygen Delivery Method): room air        I&O's Summary    21 May 2024 07:01  -  22 May 2024 07:00  --------------------------------------------------------  IN: 0 mL / OUT: 700 mL / NET: -700 mL        ON EXAM:    General: NAD, awake and alert, oriented x 3  HEENT: Mucous membranes are moist, anicteric  Lungs: Non-labored, breath sounds are clear bilaterally, No wheezing, rales or rhonchi  Cardiovascular: Regular, S1 and S2, 2/6 harsh systolic murmur best heard RUSB  Gastrointestinal: Bowel Sounds present, soft, nontender.   Lymph: No peripheral edema. No lymphadenopathy.  Skin: No rashes or ulcers  Psych:  Mood & affect appropriate    LABS: All Labs Reviewed:                        10.1   8.77  )-----------( 219      ( 22 May 2024 07:05 )             33.6                         10.0   7.84  )-----------( 234      ( 21 May 2024 07:23 )             33.1                         9.9    7.69  )-----------( 220      ( 20 May 2024 07:07 )             32.1     22 May 2024 07:04    136    |  98     |  40     ----------------------------<  207    4.4     |  24     |  2.25   21 May 2024 07:20    137    |  99     |  43     ----------------------------<  172    3.7     |  23     |  2.02   20 May 2024 07:09    137    |  99     |  50     ----------------------------<  281    4.2     |  23     |  2.10     Ca    8.5        22 May 2024 07:04  Ca    8.9        21 May 2024 07:20  Ca    8.8        20 May 2024 07:09  Phos  3.7       22 May 2024 07:04  Phos  3.5       21 May 2024 07:20  Phos  3.7       20 May 2024 07:09  Mg     2.0       22 May 2024 07:04  Mg     2.0       21 May 2024 07:20  Mg     2.1       20 May 2024 07:09    TPro  6.0    /  Alb  2.9    /  TBili  0.4    /  DBili  x      /  AST  23     /  ALT  11     /  AlkPhos  84     22 May 2024 07:04  TPro  5.9    /  Alb  2.7    /  TBili  0.4    /  DBili  x      /  AST  21     /  ALT  11     /  AlkPhos  78     21 May 2024 07:20  TPro  6.1    /  Alb  2.7    /  TBili  0.5    /  DBili  x      /  AST  19     /  ALT  10     /  AlkPhos  80     20 May 2024 07:09          Blood Culture:       TTE 4/25/24:  CONCLUSIONS:      1. Left ventricular cavity is moderately dilated. Left ventricular systolic function is severely decreased with an ejection fraction of 25 % by Nunes's method of disks. Global left ventricular hypokinesis.   2. There is no evidence of a left ventricular thrombus.   3. There is moderate (grade 2) left ventricular diastolic dysfunction, withelevated filling pressure.   4. There is calcification of the mitral valve annulus.   5. A Benitez Bakari (TAVR) valve replacement is present in the aortic position The prosthetic valve is well seated. No paravalvular regurgitation.   6. Moderately enlarged right ventricular cavity size, with normal wall thickness, and normal systolic function.   7. The right atrium is moderately dilated.   8. Left pleural effusion noted.   9. No pericardial effusion seen.  10. Compared to the transthoracic echocardiogram performed on 4/24/2024, there have been no significant interval changes.         5

## 2024-05-22 NOTE — PROGRESS NOTE ADULT - PROBLEM SELECTOR PLAN 2
Patient has waxing and waning swelling of left upper extremity during hospitalization, per Catrina.  Noted on exam on 5/16.  DVT Duplex ultrasound Bilateral upper extremities and found to have left axial, brachial, and subclavian DVT on imaging 5/16.  Now with pruritic rash  - Begun heparin gtt (5/16), discontinued 5/17  - Continue Eliquis load 10mg BID x 7 days (5/17 - 5/23).  - Initiate Eliquis 5mg BID x 90 days for continued AC (5/24 - )  - Dermatology consult for rash

## 2024-05-22 NOTE — PROGRESS NOTE ADULT - SUBJECTIVE AND OBJECTIVE BOX
[facility]   [phone]  RENAL FOLLOW UP NOTE  --------------------------------------------------------------------------------  Chief Complaint:    24 hour events/subjective:    seen earlier   denies dizziness when seen earlier     PAST HISTORY  --------------------------------------------------------------------------------  No significant changes to PMH, PSH, FHx, SHx, unless otherwise noted    ALLERGIES & MEDICATIONS  --------------------------------------------------------------------------------  Allergies    contrast media (iodine-based) (Fever; Vomiting; Flushing; Hypotension; Rash; Stomach Upset)  Ativan (Rash; Urticaria)  penicillins (Hives (Mod to Severe); Anaphylaxis (Mod to Severe); Short breath (Mod to Severe); Angioedema (Mod to Severe))    Intolerances      Standing Inpatient Medications  ammonium lactate 12% Lotion 1 Application(s) Topical <User Schedule>  apixaban 10 milliGRAM(s) Oral two times a day  aspirin  chewable 81 milliGRAM(s) Oral daily  atorvastatin 80 milliGRAM(s) Oral at bedtime  calamine/zinc oxide Lotion 1 Application(s) Topical three times a day  dextrose 10% Bolus 125 milliLiter(s) IV Bolus once  dextrose 5%. 1000 milliLiter(s) IV Continuous <Continuous>  dextrose 5%. 1000 milliLiter(s) IV Continuous <Continuous>  dextrose 50% Injectable 12.5 Gram(s) IV Push once  dextrose 50% Injectable 25 Gram(s) IV Push once  droxidopa 200 milliGRAM(s) Oral three times a day  epoetin mendoza-epbx (RETACRIT) Injectable 41597 Unit(s) SubCutaneous every 7 days  ferrous    sulfate 325 milliGRAM(s) Oral two times a day  glucagon  Injectable 1 milliGRAM(s) IntraMuscular once  insulin glargine Injectable (LANTUS) 22 Unit(s) SubCutaneous at bedtime  insulin lispro (ADMELOG) corrective regimen sliding scale   SubCutaneous <User Schedule>  insulin lispro (ADMELOG) corrective regimen sliding scale   SubCutaneous three times a day before meals  insulin lispro Injectable (ADMELOG) 10 Unit(s) SubCutaneous three times a day before meals  levothyroxine 75 MICROGram(s) Oral daily  midodrine. 15 milliGRAM(s) Oral three times a day  Nephro-molly 1 Tablet(s) Oral daily  senna 2 Tablet(s) Oral at bedtime  triamcinolone 0.1% Ointment 1 Application(s) Topical two times a day    PRN Inpatient Medications  dextrose Oral Gel 15 Gram(s) Oral once PRN  polyethylene glycol 3350 17 Gram(s) Oral two times a day PRN      REVIEW OF SYSTEMS  --------------------------------------------------------------------------------    Gen: denies  fevers/chills,  CVS: denies chest pain/palpitations  Resp: denies SOB/Cough  GI: Denies N/V/Abd pain  : Denies dysuria    VITALS/PHYSICAL EXAM  --------------------------------------------------------------------------------  T(C): 36.6 (05-22-24 @ 11:21), Max: 36.9 (05-22-24 @ 04:02)  HR: 77 (05-22-24 @ 11:22) (72 - 77)  BP: 113/69 (05-22-24 @ 11:22) (113/69 - 144/75)  RR: 18 (05-22-24 @ 11:21) (18 - 18)  SpO2: 97% (05-22-24 @ 11:22) (96% - 99%)  Wt(kg): --        05-21-24 @ 07:01  -  05-22-24 @ 07:00  --------------------------------------------------------  IN: 0 mL / OUT: 700 mL / NET: -700 mL    05-22-24 @ 07:01  -  05-22-24 @ 20:06  --------------------------------------------------------  IN: 0 mL / OUT: 600 mL / NET: -600 mL      Physical Exam:  	                Gen: comfortable appearing   	Pulm: decrease bs  no rales or ronchi or wheezing  	CV: No JVD. RRR, S1S2; no rub II/VI M   	Abd: +BS, soft, nontender/nondistended, obese   	UE: Warm, no cyanosis  no clubbing, no edema  	LE: Warm, no cyanosis  no clubbing, 2-  edema,  	Neuro: alert and oriented       LABS/STUDIES  --------------------------------------------------------------------------------              10.1   8.77  >-----------<  219      [05-22-24 @ 07:05]              33.6     136  |  98  |  40  ----------------------------<  207      [05-22-24 @ 07:04]  4.4   |  24  |  2.25        Ca     8.5     [05-22-24 @ 07:04]      Mg     2.0     [05-22-24 @ 07:04]      Phos  3.7     [05-22-24 @ 07:04]    TPro  6.0  /  Alb  2.9  /  TBili  0.4  /  DBili  x   /  AST  23  /  ALT  11  /  AlkPhos  84  [05-22-24 @ 07:04]          Creatinine Trend:  SCr 2.25 [05-22 @ 07:04]  SCr 2.02 [05-21 @ 07:20]  SCr 2.10 [05-20 @ 07:09]  SCr 2.21 [05-19 @ 06:18]      PTH -- (Ca 8.4)      [04-19-24 @ 17:54]   109  TSH 5.06      [04-14-24 @ 07:18]  Lipid: chol 74, TG 70, HDL 33, LDL --      [04-13-24 @ 07:05]

## 2024-05-22 NOTE — PROGRESS NOTE ADULT - PROBLEM SELECTOR PLAN 1
Likely in setting of deconditioning.  Improving with fluids.  Will attempt orthostatics daily.  Hoping to wean off midodrine in order to restart GDMT.  Orthostatics improving from supine to seated, but still problematic with standing.  On orthostatics 5/20, patient went from /74 seated to 58/39 standing, with heart rate increase from 80 to 116.  - F/u PT regarding discharge planning  - leg compression  - Difficulty with abdominal binder due to body habitus   - PT rec HONEY   - OOB as tolerated   - midodrine 15mg TID.  - Begin droxydopa 100mg TID  - Continue to evaluate standing orthostatics  - May be volume down  - Fluids PRN. Likely in setting of deconditioning.  Improving with fluids.  Will attempt orthostatics daily.  Hoping to wean off midodrine in order to restart GDMT.  Orthostatics improving from supine to seated, but still problematic with standing.  On orthostatics 5/20, patient went from /74 seated to 58/39 standing, with heart rate increase from 80 to 116.  Remains orthostatic  - F/u PT regarding discharge planning  - leg compression  - Difficulty with abdominal binder due to body habitus   - PT rec HONEY   - OOB as tolerated   - midodrine 15mg TID.  - Continue droxydopa 100mg TID  - Continue to evaluate standing orthostatics  - May be volume down  - Fluids PRN. Likely in setting of deconditioning.  Improving with fluids.  Will attempt orthostatics daily.  Hoping to wean off midodrine in order to restart GDMT.  Orthostatics improving from supine to seated, but still problematic with standing.  On orthostatics 5/20, patient went from /74 seated to 58/39 standing, with heart rate increase from 80 to 116.  Remains orthostatic.    - F/u PT regarding discharge planning  - leg compression  - Difficulty with abdominal binder due to body habitus   - PT rec HONEY   - OOB as tolerated   - midodrine 15mg TID.  - Continue droxydopa 100mg TID  - Continue to evaluate standing orthostatics  - May be volume down  - Fluids PRN. Likely in setting of deconditioning.  Improving with fluids.  Will attempt orthostatics daily.  Hoping to wean off midodrine in order to restart GDMT.  Orthostatics improving from supine to seated, but still problematic with standing.  On orthostatics 5/20, patient went from /74 seated to 58/39 standing, with heart rate increase from 80 to 116.  Remains orthostatic.    - F/u PT regarding discharge planning  - leg compression  - Difficulty with abdominal binder due to body habitus   - PT rec HONEY   - OOB as tolerated   - midodrine 15mg TID.  - Increase droxydopa to 200mg TID  - Continue to evaluate standing orthostatics  - May be volume down  - Fluids PRN.

## 2024-05-22 NOTE — PROGRESS NOTE ADULT - PROBLEM SELECTOR PLAN 6
EF from 4/2024 (post-TAVR) w/ EF 25%, global LV hypokinesis, moderately dilated LV, grade 2 LV diastolic dysfunction, RA moderately dilated.      - cards following   - Will f/u with cardiology regarding resumption of GDMT  - continue midodrine 15mg TID   - Continue atorvastatin 80mg daily  - hold home torsemide 20mg qd  - hold home eplerenone 25mg qd  - hold home metoprolol succinate 100mg qd  - restart GDMT as tolerated  - f/u w/ EP outpatient for CRT. EF from 4/2024 (post-TAVR) w/ EF 25%, global LV hypokinesis, moderately dilated LV, grade 2 LV diastolic dysfunction, RA moderately dilated.  Repeat TTE with reduced LV function.  TAVR remains well seated    - cards following   - Will f/u with cardiology regarding resumption of GDMT  - continue midodrine 15mg TID   - Continue atorvastatin 80mg daily  - hold home torsemide 20mg qd  - hold home eplerenone 25mg qd  - hold home metoprolol succinate 100mg qd  - restart GDMT as tolerated  - f/u w/ EP outpatient for CRT.

## 2024-05-22 NOTE — PROGRESS NOTE ADULT - SUBJECTIVE AND OBJECTIVE BOX
INTERVAL HPI/OVERNIGHT EVENTS:    S:  Patient is a 73y Female Patient is a 73y old  Female who presents with a chief complaint of Syncope (22 May 2024 09:30)      No overnight events. Dermatology reconsulted for concern of new/flaring of left upper extremity with question for whether this is new process or related iso known AGEP biopsied earlier this admission. Patient endorses significant dryness of skin, unable to improve with current topical agents she has been using in the hospital. Has not used topical triamcinolone recently.     ________________________________    REVIEW OF SYSTEMS      General: no fevers/chills, no NS	    Skin: see HPI  	  Ophthalmologic: no eye pain or change in vision    Genitourinary: no dysuria or hematuria    Musculoskeletal: no joint pains or weakness	    Neurological:no weakness or tingling        ROS negative except if noted in the above subjective text. All other systems reviewed and negative.    MEDICATIONS  (STANDING):  ammonium lactate 12% Lotion 1 Application(s) Topical <User Schedule>  apixaban 10 milliGRAM(s) Oral two times a day  aspirin  chewable 81 milliGRAM(s) Oral daily  atorvastatin 80 milliGRAM(s) Oral at bedtime  calamine/zinc oxide Lotion 1 Application(s) Topical three times a day  dextrose 10% Bolus 125 milliLiter(s) IV Bolus once  dextrose 5%. 1000 milliLiter(s) (50 mL/Hr) IV Continuous <Continuous>  dextrose 5%. 1000 milliLiter(s) (100 mL/Hr) IV Continuous <Continuous>  dextrose 50% Injectable 12.5 Gram(s) IV Push once  dextrose 50% Injectable 25 Gram(s) IV Push once  droxidopa 200 milliGRAM(s) Oral three times a day  epoetin mendoza-epbx (RETACRIT) Injectable 65725 Unit(s) SubCutaneous every 7 days  ferrous    sulfate 325 milliGRAM(s) Oral two times a day  glucagon  Injectable 1 milliGRAM(s) IntraMuscular once  insulin glargine Injectable (LANTUS) 22 Unit(s) SubCutaneous at bedtime  insulin lispro (ADMELOG) corrective regimen sliding scale   SubCutaneous <User Schedule>  insulin lispro (ADMELOG) corrective regimen sliding scale   SubCutaneous three times a day before meals  insulin lispro Injectable (ADMELOG) 10 Unit(s) SubCutaneous three times a day before meals  levothyroxine 75 MICROGram(s) Oral daily  midodrine. 15 milliGRAM(s) Oral three times a day  Nephro-molly 1 Tablet(s) Oral daily  senna 2 Tablet(s) Oral at bedtime  triamcinolone 0.1% Ointment 1 Application(s) Topical two times a day    MEDICATIONS  (PRN):  dextrose Oral Gel 15 Gram(s) Oral once PRN Blood Glucose LESS THAN 70 milliGRAM(s)/deciliter  polyethylene glycol 3350 17 Gram(s) Oral two times a day PRN Constipation      Allergies    contrast media (iodine-based) (Fever; Vomiting; Flushing; Hypotension; Rash; Stomach Upset)  Ativan (Rash; Urticaria)  penicillins (Hives (Mod to Severe); Anaphylaxis (Mod to Severe); Short breath (Mod to Severe); Angioedema (Mod to Severe))    Intolerances          O: ICU Vital Signs Last 24 Hrs  T(C): 36.6 (22 May 2024 11:21), Max: 36.9 (22 May 2024 04:02)  T(F): 97.9 (22 May 2024 11:21), Max: 98.4 (22 May 2024 04:02)  HR: 77 (22 May 2024 11:22) (72 - 77)  BP: 113/69 (22 May 2024 11:22) (113/69 - 144/75)  BP(mean): 98 (22 May 2024 11:21) (98 - 98)  ABP: --  ABP(mean): --  RR: 18 (22 May 2024 11:21) (18 - 18)  SpO2: 97% (22 May 2024 11:22) (96% - 99%)    O2 Parameters below as of 22 May 2024 11:22  Patient On (Oxygen Delivery Method): room air          I&O's Detail    21 May 2024 07:01  -  22 May 2024 07:00  --------------------------------------------------------  IN:  Total IN: 0 mL    OUT:    Indwelling Catheter - Urethral (mL): 400 mL    Intermittent Catheterization - Urethral (mL): 300 mL  Total OUT: 700 mL    Total NET: -700 mL      22 May 2024 07:01  -  22 May 2024 15:41  --------------------------------------------------------  IN:  Total IN: 0 mL    OUT:    Indwelling Catheter - Urethral (mL): 600 mL  Total OUT: 600 mL    Total NET: -600 mL          ___________________________________  PHYSICAL EXAM:     The patient was alert and oriented X 3, well nourished, and in no  apparent distress.  OP showed no ulcerations  There was no visible lymphadenopathy.  Conjunctiva were non injected  There was no clubbing or edema of extremities.  The scalp, hair, face, eyebrows, lips, OP, neck, chest, back,   extremities X 4, nails were examined.  There was no hyperhidrosis or bromhidrosis.    Of note on skin exam:   Large surface area of desquamative scale and mild background erythema on the chest and upper extremities. Noted erythema circumferentially around the left upper extremity with areas of linear excoriation and crust.   ____________________________________      Labs:                       10.1   8.77  )-----------( 219      ( 22 May 2024 07:05 )             33.6     CBC Full  -  ( 22 May 2024 07:05 )  WBC Count : 8.77 K/uL  RBC Count : 3.48 M/uL  Hemoglobin : 10.1 g/dL  Hematocrit : 33.6 %  Platelet Count - Automated : 219 K/uL  Mean Cell Volume : 96.6 fl  Mean Cell Hemoglobin : 29.0 pg  Mean Cell Hemoglobin Concentration : 30.1 gm/dL  Auto Neutrophil # : 5.85 K/uL  Auto Lymphocyte # : 0.94 K/uL  Auto Monocyte # : 0.92 K/uL  Auto Eosinophil # : 0.87 K/uL  Auto Basophil # : 0.12 K/uL  Auto Neutrophil % : 66.7 %  Auto Lymphocyte % : 10.7 %  Auto Monocyte % : 10.5 %  Auto Eosinophil % : 9.9 %  Auto Basophil % : 1.4 %    05-22    136  |  98  |  40<H>  ----------------------------<  207<H>  4.4   |  24  |  2.25<H>    Ca    8.5      22 May 2024 07:04  Phos  3.7     05-22  Mg     2.0     05-22    TPro  6.0  /  Alb  2.9<L>  /  TBili  0.4  /  DBili  x   /  AST  23  /  ALT  11  /  AlkPhos  84  05-22      Urinalysis Basic - ( 22 May 2024 07:04 )    Color: x / Appearance: x / SG: x / pH: x  Gluc: 207 mg/dL / Ketone: x  / Bili: x / Urobili: x   Blood: x / Protein: x / Nitrite: x   Leuk Esterase: x / RBC: x / WBC x   Sq Epi: x / Non Sq Epi: x / Bacteria: x      CAPILLARY BLOOD GLUCOSE      POCT Blood Glucose.: 209 mg/dL (22 May 2024 13:15)

## 2024-05-22 NOTE — PROGRESS NOTE ADULT - ASSESSMENT
73F w/h/o of T1DM with unknown control due to CKD and anemia of chronic disease. DM c/b CKD. Also h/o HFrEF (EF 30%), mod AS, known LBBB, HTN,  hypothyroidism, chronic lymphedema, suspected OHS/LELIA on 3L NC home O2 p/w 1 episode of syncope with R arm jerking, likely 2/2 severe symptomatic AS. S/p AVR 4/24 c/b DUNCAN on CKD, fever and hypotension. Also UTI/pul edema/ sepsis and L adrenal nodule finding. Endocrine consult done for DM and adrenal nodule management. Tolerating POs with variable BG levels without any pattern due to variable PO intake. No hypoglycemia but BG down to 70s last night after getting meal time insulin for dinner but eating very little. Instructed pt to report to staff if not eating or not eating enough to hold meal time insulin.Pt verbalized understanding.  Received less Lantus last night but pt states she will communicate to staff if not eating so no need to change Lantus doses for now. Spoke to RN and team about plan of care. BG goal 100 to 180s. No hypoglycemia.   Noted creat stable in 2s    Per endocrine attending Dr Burton> Adrenal nodule work up completed and pt will need to f/u once she is more stable as out pt.       Home regimen: Lantus 33 units qhs, Novolog based on sliding scale TIDAC normally 3-5 units  Has Dexcom G7

## 2024-05-22 NOTE — PROGRESS NOTE ADULT - PROBLEM SELECTOR PLAN 3
Home regimen: lantus 33u qhs w/ premeal sliding scale. A1c 7.4% in 3/2024. multiple hypoglycemic episodes, DKA now resolved, anion gap closed.  Close discussions with endocrine ongoing regarding management.  [facility]    - endo following, appreciate recs   - Basal insulin per endocrine recs  - Dosing premeal insulin per endocrine recs  - LDSSI and LDSSI at night  - premeal and bedtime fingersticks  - hypoglycemia protocol.

## 2024-05-22 NOTE — PROGRESS NOTE ADULT - SUBJECTIVE AND OBJECTIVE BOX
DIABETES FOLLOW UP NOTE: Saw pt earlier today    Chief Complaint: Endocrine consult requested for management of DM    INTERVAL HX: Pt stable, reports not feeling well> had hypotension earlier today while working with PT and also concerned about BGs in 70s last night> pt states that at times she doesn't eat much. Pt states she was given food last night and also received less Lantus insulin so that was the cause of having BG 200s today. Noted pt received Lantus 10 units instead of 22 units last night. Creat at 2s    Review of Systems:  General: As above  Cardiovascular: No chest pain, palpitations  Respiratory: No SOB, no cough  GI: No nausea, vomiting, abdominal pain  Endocrine: No polyuria, polydipsia or S&Sx of hypoglycemia    Allergies    contrast media (iodine-based) (Fever; Vomiting; Flushing; Hypotension; Rash; Stomach Upset)  Ativan (Rash; Urticaria)  penicillins (Hives (Mod to Severe); Anaphylaxis (Mod to Severe); Short breath (Mod to Severe); Angioedema (Mod to Severe))    Intolerances      MEDICATIONS:  atorvastatin 80 milliGRAM(s) Oral at bedtime  insulin glargine Injectable (LANTUS) 22 Unit(s) SubCutaneous at bedtime  insulin lispro (ADMELOG) corrective regimen sliding scale   SubCutaneous <User Schedule>  insulin lispro (ADMELOG) corrective regimen sliding scale   SubCutaneous three times a day before meals  insulin lispro Injectable (ADMELOG) 10 Unit(s) SubCutaneous three times a day before meals  levothyroxine 75 MICROGram(s) Oral daily      PHYSICAL EXAM:  VITALS: T(C): 36.6 (05-22-24 @ 11:21)  T(F): 97.9 (05-22-24 @ 11:21), Max: 98.4 (05-22-24 @ 04:02)  HR: 77 (05-22-24 @ 11:22) (72 - 77)  BP: 113/69 (05-22-24 @ 11:22) (113/69 - 144/75)  RR:  (18 - 18)  SpO2:  (96% - 99%)  Wt(kg): --  GENERAL: Female laying in bed in NAD. Relative at bedside  Abdomen: Soft, nontender, non distended. + obesity  Extremities: Warm,+ edema in LEs with ace bandages on D&I. Also noted scratches/redness in LUE> Pt states she scratched arm while sleeping   NEURO: A&O X3    LABS:  POCT Blood Glucose.: 209 mg/dL (05-22-24 @ 13:15)  POCT Blood Glucose.: 263 mg/dL (05-22-24 @ 09:07)  POCT Blood Glucose.: 189 mg/dL (05-22-24 @ 02:32)  POCT Blood Glucose.: 77 mg/dL (05-21-24 @ 21:42)  POCT Blood Glucose.: 76 mg/dL (05-21-24 @ 21:21)  POCT Blood Glucose.: 228 mg/dL (05-21-24 @ 17:12)  POCT Blood Glucose.: 198 mg/dL (05-21-24 @ 12:58)  POCT Blood Glucose.: 185 mg/dL (05-21-24 @ 09:10)  POCT Blood Glucose.: 168 mg/dL (05-21-24 @ 02:18)  POCT Blood Glucose.: 170 mg/dL (05-20-24 @ 21:40)  POCT Blood Glucose.: 216 mg/dL (05-20-24 @ 17:31)  POCT Blood Glucose.: 247 mg/dL (05-20-24 @ 13:20)  POCT Blood Glucose.: 306 mg/dL (05-20-24 @ 08:46)  POCT Blood Glucose.: 286 mg/dL (05-20-24 @ 02:13)  POCT Blood Glucose.: 186 mg/dL (05-19-24 @ 21:23)  POCT Blood Glucose.: 295 mg/dL (05-19-24 @ 17:24)                            10.1   8.77  )-----------( 219      ( 22 May 2024 07:05 )             33.6       05-22    136  |  98  |  40<H>  ----------------------------<  207<H>  4.4   |  24  |  2.25<H>    eGFR: 23<L>    Ca    8.5      05-22  Mg     2.0     05-22  Phos  3.7     05-22    TPro  6.0  /  Alb  2.9<L>  /  TBili  0.4  /  DBili  x   /  AST  23  /  ALT  11  /  AlkPhos  84  05-22      A1C with Estimated Average Glucose Result: 7.4 % (03-30-24 @ 08:21)      Estimated Average Glucose: 166 mg/dL (03-30-24 @ 08:21)        04-13 Chol 74 Direct LDL -- LDL calculated 25 HDL 33<L> Trig 70

## 2024-05-22 NOTE — PROGRESS NOTE ADULT - ASSESSMENT
73F w HFrEF (EF 30%), mod AS, known LBBB, HTN, IDDM1, CKD, hypothyroidism, chronic lymphedema, p/w 1 episode of syncope with R arm jerking.     #Syncope-Aortic Stenosis  - Known Aortic Stenosis likely Severe in setting of decreased LVEF  - s/p tavr on 4/24 c/b VT -> shock -> VFib -> shock  - hypoxic/congested, and was intubated, now extubated on 4/25 in the evening, on NC   - on 4/25 with worsening bradycardia, and now s/p TVP placement followed by AV Micra placement with removal of TVP  - Remains in Sinus Rhythm/known lbbb with intermittent   - Vaso has been weaned off with the uptitration of midodrine   - On Midodrine 15mg Q8.  - Droxidopa initiated as noted below  - to consider CRT-D in the future  - Cannot initiate GDMT due to orthostasis  - would try to mobilize as best as possible and monitor orthostatics  - cont asa and statin    #Chronic Systolic HF (EF 30%), mod to severe AS, HTN, LBBB, Lymphedema   - Has known systolic HF and AS.    - Repeat TTE showed EF 30%, mild-mod LVH, mildly reduced RVF, mod-severe AS (.61 cmsq), mod pHTN  - On home Torsemide 20 mg daily, Eplerenone 25 mg daily, and Toprol  mg daily, all currently on hold  - On Midodrine 15mg TID for orthostatic hypotension  - Droxidopa now initiated as well for significant orthostasis with PT the other day  - Unable to initiate GDMT at this time due to orthostatic hypotension  - initially CVVHD, then HD  - HD now on hold, as she is urinating and creatinine remains stable  - prn iv bumex  - renal fu  - Diagnosed with UE DVT and now on full loading dose of Eliquis  - PT  - Bed to chair as much as possible  - will follow with you

## 2024-05-22 NOTE — PROGRESS NOTE ADULT - PROBLEM SELECTOR PLAN 5
Presented for syncope secondary to severe AS, s/p TAVR on 4/24 c/b by VT -> shock -> VFib -> shock.  Course complicated w/ symptomatic bradycardia, requiring TVP, now s/p micra PPM.     - continue aspirin 81mg daily. Presented for syncope secondary to severe AS, s/p TAVR on 4/24 c/b by VT -> shock -> VFib -> shock.  Course complicated w/ symptomatic bradycardia, requiring TVP, now s/p micra PPM. TAVR well seated on recent TTE without regurgitation    - continue aspirin 81mg daily.

## 2024-05-22 NOTE — PROGRESS NOTE ADULT - PROBLEM SELECTOR PLAN 4
DUNCAN secondary to ATN from hypotension and contrast nephropathy. CKD stage 4. s/p CRRT and bumex drip in CICU.  Cr 3.08-> 3.57 -> 3.66 -> 3.48.  [place] Garza for urinary retention (placed 5/10 and replaced ~5/17).  Improving and appears stable around 2.2    - f/u nephro recs   - strict I's and O's  - renally dose meds, avoid nephrotoxic agents.

## 2024-05-22 NOTE — PROGRESS NOTE ADULT - ASSESSMENT
73F w HFrEF (EF 30%), mod AS, known LBBB, HTN, IDDM1, CKD, hypothyroidism, chronic lymphedema, suspected OHS/LELIA on 3L NC home O2 p/w 1 episode of syncope. Recent admission at [facility] (3/29-4/5) for ADHF and DUNCAN s/p diuresis, discharged with new home O2 3L NC suspecting OHS/LELIA pending outpatient w/u. Since discharge a week ago, she has been staying at home with   Pt had sob walking to car. Once in car, she was still breathing heavily. Partner noticed pt was not responding to verbal stimuli for 10-15sec and witnessed R arm jerking. Pt gained consciousness quickly without postictal symptoms. Pt went to Cardiologist Dr. Nathan who recommended pt to come to ED. No fever, cp, abd pain, n/v. Leg swelling is improved from prior. Pt on torsemide 40mg which she has been taking. Pt was discharged on 3LNC which she is currently on. Patient reports she did not take Farxiga that she was discharged on as she was concerned about side effects.     In ED, patient was found afebrile, hemodynamically stable, breathing well on 3L NC. Initial labs notable for mild hyponatremia, DUNCAN on CKD, elevated proBNP and elevated pCO2 both improved from prior.  pt also noticed with abn creatinine. had severe AS had ct scan with IV contrast had contrast nephropathy and hypotension ---> CRRT required       1- CKD IV  with DUNCAN   2- CHF   3- lymphedema   4- severe AS  s/p tavr 4/24  5- hypotension       creatinine is overall steady in this range  now developing LE edema, ACE bandage is on, she may need to resume diuretics soon   orthostatic hypotension,   continue midodrine 15 mg tid,  northera  increase to 200 mg TID   continue to check orthostatic bp  also PT for conditioning   anemia, trend hgb  retacrit 10,000 units weekly  non-oliguric, off Hd   hardy --->  for urinary retention    strict I/O

## 2024-05-22 NOTE — PROGRESS NOTE ADULT - PROBLEM SELECTOR PLAN 8
Likely delayed contrast reaction vs pantoprazole. Derm biopsy consistent w/ agep. s/p nystatin and triamcinolone cream to affected debi. Improving. Now with new rash    - continue to monitor  - calamine cream, vaseline  - Derm re-consulted Likely delayed contrast reaction vs pantoprazole. Derm biopsy consistent w/ agep. s/p nystatin and triamcinolone cream to affected debi. Improving. Now with new rash    - continue to monitor  - calamine cream, vaseline  - Restart triamcinolone  - Derm re-consulted

## 2024-05-22 NOTE — PROGRESS NOTE ADULT - PROBLEM SELECTOR PLAN 1
- Test BG ac and hs/2am  - Continue with Lantus  22 units at hs. If BG at bedtime <100 please administer 10 units and provide snack. Will re-assess daily   - C/w Admelog dose 8 units TIDAC for now> Pt to inform staff if not eating or not eating enough  - C/w Low dose admelog correction scale TIDAC, Low dose admelog correction scale QHS and 2am in case of rebound hypo/hyperglycemia  -Will follow  Discharge:  Will be determined according to BG/insulin needs/PO intake  at time of discharge. Basal/bolus insulin doses TBD  - Of note, patient instructed on discharge from recent hospitalization at [place] to start farxiga for CHF. Given risks of DKA of SGLT2i in T1DM, would not start until better data is available for its benefits.  - Follow up with Dr. Luis Dumont outpatient  -Pt will likely required rehab  F/u with optho/renal/cardiac as out pt

## 2024-05-22 NOTE — PROGRESS NOTE ADULT - ATTENDING COMMENTS
73F PMH: HFrEF (EF 30%), AS, LBBB, HTN, DM1, CKD, hypothyroidism, chronic lymphedema, LELIA (3L home O2) p/w for syncope 2/2 severe AS, s/p TAVR 4/24. Course complicated by contrast induced allergic reaction (had CTA for TAVR eval) and contrast related renal failure (acute on chronic) requiring HD. TAVR c/b VT and vfib requiring shock and flash pulmonary edema causing AHRF requiring intubation.     Course complicated by contrast induced allergic reaction (had CTA for TAVR eval) and contrast related renal failure (acute on chronic) requiring CRRT for fluid removal and pressors for vasoplegia and hypovolemia due to CRRT. Patient extubated, now off pressors, CRRT.       1. Orthostatic hypotension- Added droxydopa to midodrine yesterday and the BP is now low on sitting- SBP 60s. Spoke with nephro Dr. Fish.  2. Left UE DVT- c/w eliquis. Apprciate derm for arm rash  3.T1DM- uncontrolled with intermittent episodes of ble BS- . Being manged by endocrinology and currently on Lantus and ademolog.  4. DUNCAN on CKD stage 4- cr stable  5. HFrEF- currently GDMT is on hold secondary to OH

## 2024-05-22 NOTE — PROGRESS NOTE ADULT - PROBLEM SELECTOR PLAN 3
Incidentally found on CT c/a/p w/wo IV contrast done for TAVR evaluation. The left adrenal gland is thickened and a 1.2 x 0.8 cm left adrenal nodule is seen. The right adrenal gland is normal.   Primary team discussed with radiology. HU of the adrenal nodule not able to be accurately determined due to motion artifact, also given imaging was taken at venous phase.    Patient had another CT scan which showed normal adrenals on 5/3/24  Test for excess adrenal hormones:   - Dex suppression test: AM cortisol 9.4 not suppressed with sufficiently high dexamethasone 374 (4/18/24). Repeat test AM cortisol 4.1 not suppressed with ACTH 9.5, dex level 449 (4/20/24). Concerning for possible Cushings, likely primary adrenal source given ACTH not elevated, however, patient is also in ICU on pressors and in a stressed state. Urine cortisol low at 1.2mcg/24 hour but only 200ml for 24 hours. Salivary cortisol cancelled for QNS, repeat salivary cortisol specimen received   - plasma metanephrines 46.8 normal range    - serum aldosterone is elevated to 37.4. Plasma renin activity 9.775. Ratio = 3.82, not elevated, no hyperaldosteronism.    - DHEAS 139 wnl. Androstenedione <10.    PLAN  - Patient with 2 positive DST - concerning for cushing syndrome. No indication for treatment at this time, would recommend repeating testing outpatient after patient resolved from acute critical illness - will give signout to patient's endocrinologist Dr. Luis Dumont prior to discharge   - Follow up salivary cortisol as out pt as per Dr Burton.    - May need to repeat 24 hour urine cortisol once renal function improved and no longer on CRRT   -Renal function improving but remains with creat in 3s. No need to test at this time. If team wishes can send salivary cortisol and 24 hour urine cortisol prior to discharge but per Dr Burton, test results won't be available  until 1-2 weeks.   - Will need follow up outpatient with Dr. Luis Dumont CHEST PAIN

## 2024-05-22 NOTE — PROGRESS NOTE ADULT - SUBJECTIVE AND OBJECTIVE BOX
***************************************************************  Jairo Kaufman, PG1  Internal Medicine   Pager:  TEAMS  ***************************************************************    EVELYN LOPEZ  73y  MRN: [id number]    Patient is a 73y old  Female who presents with a chief complaint of Syncope (21 May 2024 17:10)      Interval/Overnight Events: no events ON.     Subjective: Pt seen and examined at bedside. Denies fever, CP, SOB, abn pain, N/V, dysuria. Tolerating diet.      MEDICATIONS  (STANDING):  ammonium lactate 12% Lotion 1 Application(s) Topical <User Schedule>  apixaban 10 milliGRAM(s) Oral two times a day  aspirin  chewable 81 milliGRAM(s) Oral daily  atorvastatin 80 milliGRAM(s) Oral at bedtime  calamine/zinc oxide Lotion 1 Application(s) Topical three times a day  dextrose 10% Bolus 125 milliLiter(s) IV Bolus once  dextrose 5%. 1000 milliLiter(s) (100 mL/Hr) IV Continuous <Continuous>  dextrose 5%. 1000 milliLiter(s) (50 mL/Hr) IV Continuous <Continuous>  dextrose 50% Injectable 12.5 Gram(s) IV Push once  dextrose 50% Injectable 25 Gram(s) IV Push once  droxidopa 100 milliGRAM(s) Oral every 8 hours  epoetin mendoza-epbx (RETACRIT) Injectable 60325 Unit(s) SubCutaneous every 7 days  ferrous    sulfate 325 milliGRAM(s) Oral two times a day  glucagon  Injectable 1 milliGRAM(s) IntraMuscular once  insulin glargine Injectable (LANTUS) 22 Unit(s) SubCutaneous at bedtime  insulin lispro (ADMELOG) corrective regimen sliding scale   SubCutaneous three times a day before meals  insulin lispro (ADMELOG) corrective regimen sliding scale   SubCutaneous <User Schedule>  insulin lispro Injectable (ADMELOG) 10 Unit(s) SubCutaneous three times a day before meals  levothyroxine 75 MICROGram(s) Oral daily  midodrine. 15 milliGRAM(s) Oral three times a day  Nephro-molly 1 Tablet(s) Oral daily  senna 2 Tablet(s) Oral at bedtime    MEDICATIONS  (PRN):  dextrose Oral Gel 15 Gram(s) Oral once PRN Blood Glucose LESS THAN 70 milliGRAM(s)/deciliter  polyethylene glycol 3350 17 Gram(s) Oral two times a day PRN Constipation      Objective:    Vitals: Vital Signs Last 24 Hrs  T(C): 36.9 (05-22-24 @ 04:02), Max: 36.9 (05-22-24 @ 04:02)  T(F): 98.4 (05-22-24 @ 04:02), Max: 98.4 (05-22-24 @ 04:02)  HR: 77 (05-22-24 @ 04:02) (71 - 77)  BP: 119/68 (05-22-24 @ 04:02) (119/68 - 134/66)  BP(mean): --  RR: 18 (05-22-24 @ 04:02) (18 - 18)  SpO2: 96% (05-22-24 @ 04:02) (96% - 99%)                I&O's Summary    20 May 2024 07:01  -  21 May 2024 07:00  --------------------------------------------------------  IN: 810 mL / OUT: 1800 mL / NET: -990 mL    21 May 2024 07:01  -  22 May 2024 06:25  --------------------------------------------------------  IN: 0 mL / OUT: 400 mL / NET: -400 mL        PHYSICAL EXAM:  GENERAL: NAD  HEAD:  Atraumatic, Normocephalic  EYES: EOMI, PERRL, conjunctiva and sclera clear  ENMT: Moist mucous membranes  NECK: Supple  CHEST/LUNG: Clear to auscultation bilaterally; No rales, rhonchi, wheezing, or rubs  HEART: Regular rate and rhythm, 4/6 systolic murmur  ABDOMEN: Soft, Nontender, Nondistended; Bowel sounds present  EXTREMITIES:  2+ Peripheral Pulses, 2+ b/l edema up to thighs, No clubbing, cyanosis.  Left arm swelling returned  SKIN: generalized rash resolved, patient now with pruritic erythemic macular rash on LUE  NERVOUS SYSTEM:  Alert, no focal deficits  PSYCH:  Appropriate affect      LABS:                        10.0   7.84  )-----------( 234      ( 21 May 2024 07:23 )             33.1                         9.9    7.69  )-----------( 220      ( 20 May 2024 07:07 )             32.1     05-21    137  |  99  |  43<H>  ----------------------------<  172<H>  3.7   |  23  |  2.02<H>  05-20    137  |  99  |  50<H>  ----------------------------<  281<H>  4.2   |  23  |  2.10<H>    Ca    8.9      21 May 2024 07:20  Ca    8.8      20 May 2024 07:09  Phos  3.5     05-21  Mg     2.0     05-21    TPro  5.9<L>  /  Alb  2.7<L>  /  TBili  0.4  /  DBili  x   /  AST  21  /  ALT  11  /  AlkPhos  78  05-21  TPro  6.1  /  Alb  2.7<L>  /  TBili  0.5  /  DBili  x   /  AST  19  /  ALT  10  /  AlkPhos  80  05-20    CAPILLARY BLOOD GLUCOSE      POCT Blood Glucose.: 189 mg/dL (22 May 2024 02:32)  POCT Blood Glucose.: 77 mg/dL (21 May 2024 21:42)  POCT Blood Glucose.: 76 mg/dL (21 May 2024 21:21)  POCT Blood Glucose.: 228 mg/dL (21 May 2024 17:12)  POCT Blood Glucose.: 198 mg/dL (21 May 2024 12:58)  POCT Blood Glucose.: 185 mg/dL (21 May 2024 09:10)        Urinalysis Basic - ( 21 May 2024 07:20 )    Color: x / Appearance: x / SG: x / pH: x  Gluc: 172 mg/dL / Ketone: x  / Bili: x / Urobili: x   Blood: x / Protein: x / Nitrite: x   Leuk Esterase: x / RBC: x / WBC x   Sq Epi: x / Non Sq Epi: x / Bacteria: x          RADIOLOGY & ADDITIONAL TESTS:    Imaging Personally Reviewed:  [x ] YES  [ ] NO    Consultants involved in case:   Consultant(s) Notes Reviewed:  [ x] YES  [ ] NO:   Care Discussed with Consultants/Other Providers [x ] YES  [ ] NO         ***************************************************************  Jairo Kaufman, PG1  Internal Medicine   Pager:  TEAMS  ***************************************************************    EVELYN LOPEZ  73y  MRN: [id number]    Patient is a 73y old  Female who presents with a chief complaint of Syncope (21 May 2024 17:10)      Interval/Overnight Events: no events ON.     Subjective: Pt seen and examined at bedside. Feels well today.  Tolerating diet.  When patient saw PT today was orthostatic from supine to seated position.      MEDICATIONS  (STANDING):  ammonium lactate 12% Lotion 1 Application(s) Topical <User Schedule>  apixaban 10 milliGRAM(s) Oral two times a day  aspirin  chewable 81 milliGRAM(s) Oral daily  atorvastatin 80 milliGRAM(s) Oral at bedtime  calamine/zinc oxide Lotion 1 Application(s) Topical three times a day  dextrose 10% Bolus 125 milliLiter(s) IV Bolus once  dextrose 5%. 1000 milliLiter(s) (100 mL/Hr) IV Continuous <Continuous>  dextrose 5%. 1000 milliLiter(s) (50 mL/Hr) IV Continuous <Continuous>  dextrose 50% Injectable 12.5 Gram(s) IV Push once  dextrose 50% Injectable 25 Gram(s) IV Push once  droxidopa 100 milliGRAM(s) Oral every 8 hours  epoetin mendoza-epbx (RETACRIT) Injectable 09195 Unit(s) SubCutaneous every 7 days  ferrous    sulfate 325 milliGRAM(s) Oral two times a day  glucagon  Injectable 1 milliGRAM(s) IntraMuscular once  insulin glargine Injectable (LANTUS) 22 Unit(s) SubCutaneous at bedtime  insulin lispro (ADMELOG) corrective regimen sliding scale   SubCutaneous three times a day before meals  insulin lispro (ADMELOG) corrective regimen sliding scale   SubCutaneous <User Schedule>  insulin lispro Injectable (ADMELOG) 10 Unit(s) SubCutaneous three times a day before meals  levothyroxine 75 MICROGram(s) Oral daily  midodrine. 15 milliGRAM(s) Oral three times a day  Nephro-molly 1 Tablet(s) Oral daily  senna 2 Tablet(s) Oral at bedtime    MEDICATIONS  (PRN):  dextrose Oral Gel 15 Gram(s) Oral once PRN Blood Glucose LESS THAN 70 milliGRAM(s)/deciliter  polyethylene glycol 3350 17 Gram(s) Oral two times a day PRN Constipation      Objective:    Vitals: Vital Signs Last 24 Hrs  T(C): 36.9 (05-22-24 @ 04:02), Max: 36.9 (05-22-24 @ 04:02)  T(F): 98.4 (05-22-24 @ 04:02), Max: 98.4 (05-22-24 @ 04:02)  HR: 77 (05-22-24 @ 04:02) (71 - 77)  BP: 119/68 (05-22-24 @ 04:02) (119/68 - 134/66)  BP(mean): --  RR: 18 (05-22-24 @ 04:02) (18 - 18)  SpO2: 96% (05-22-24 @ 04:02) (96% - 99%)                I&O's Summary    20 May 2024 07:01  -  21 May 2024 07:00  --------------------------------------------------------  IN: 810 mL / OUT: 1800 mL / NET: -990 mL    21 May 2024 07:01  -  22 May 2024 06:25  --------------------------------------------------------  IN: 0 mL / OUT: 400 mL / NET: -400 mL        PHYSICAL EXAM:  GENERAL: NAD  HEAD:  Atraumatic, Normocephalic  EYES: EOMI, PERRL, conjunctiva and sclera clear  ENMT: Moist mucous membranes  NECK: Supple  CHEST/LUNG: Clear to auscultation bilaterally; No rales, rhonchi, wheezing, or rubs  HEART: Regular rate and rhythm, 4/6 systolic murmur  ABDOMEN: Soft, Nontender, Nondistended; Bowel sounds present  EXTREMITIES:  2+ Peripheral Pulses, 2+ b/l edema up to thighs, No clubbing, cyanosis.  SKIN: generalized rash resolved, patient now with pruritic erythemic macular rash on LUE  NERVOUS SYSTEM:  Alert, no focal deficits  PSYCH:  Appropriate affect      LABS:                        10.0   7.84  )-----------( 234      ( 21 May 2024 07:23 )             33.1                         9.9    7.69  )-----------( 220      ( 20 May 2024 07:07 )             32.1     05-21    137  |  99  |  43<H>  ----------------------------<  172<H>  3.7   |  23  |  2.02<H>  05-20    137  |  99  |  50<H>  ----------------------------<  281<H>  4.2   |  23  |  2.10<H>    Ca    8.9      21 May 2024 07:20  Ca    8.8      20 May 2024 07:09  Phos  3.5     05-21  Mg     2.0     05-21    TPro  5.9<L>  /  Alb  2.7<L>  /  TBili  0.4  /  DBili  x   /  AST  21  /  ALT  11  /  AlkPhos  78  05-21  TPro  6.1  /  Alb  2.7<L>  /  TBili  0.5  /  DBili  x   /  AST  19  /  ALT  10  /  AlkPhos  80  05-20    CAPILLARY BLOOD GLUCOSE      POCT Blood Glucose.: 189 mg/dL (22 May 2024 02:32)  POCT Blood Glucose.: 77 mg/dL (21 May 2024 21:42)  POCT Blood Glucose.: 76 mg/dL (21 May 2024 21:21)  POCT Blood Glucose.: 228 mg/dL (21 May 2024 17:12)  POCT Blood Glucose.: 198 mg/dL (21 May 2024 12:58)  POCT Blood Glucose.: 185 mg/dL (21 May 2024 09:10)        Urinalysis Basic - ( 21 May 2024 07:20 )    Color: x / Appearance: x / SG: x / pH: x  Gluc: 172 mg/dL / Ketone: x  / Bili: x / Urobili: x   Blood: x / Protein: x / Nitrite: x   Leuk Esterase: x / RBC: x / WBC x   Sq Epi: x / Non Sq Epi: x / Bacteria: x          RADIOLOGY & ADDITIONAL TESTS:    Imaging Personally Reviewed:  [x ] YES  [ ] NO    Consultants involved in case:   Consultant(s) Notes Reviewed:  [ x] YES  [ ] NO:   Care Discussed with Consultants/Other Providers [x ] YES  [ ] NO

## 2024-05-22 NOTE — PROGRESS NOTE ADULT - ASSESSMENT
ASSESSMENT AND PLAN:  #resolving erythrodermic drug eruption  Initial etiology favored from pantoprazole vs contrast, (diffuse redness of the skin affecting more than 90% of the body surface), other common causes of erythroderma in adults are psoriasis, atopic dermatitis -- less favored are cutaneous T-cell lymphoma (CTCL), pityriasis rubra pilaris (unlikely in this patient)  - Monitor for complications of erythroderma (fluid and electrolyte abnormalities, cardiac failure) are important, as temperature dysregulation, fluid loss, and heart failure can become a cause of mortality in patients. Secondary infections and sepsis can occur from breakdown of the skin barrier. pt has no skin denuding.  Bx 4/17: Subcorneal pustule, and a moderate superficial dermal perivascular  and interstitial mixed infiltrate with neutrophils and eosinophils. These findings can be seen in a pustular drug reaction or acute generalized exanthematous pustulosis    Please follow-up note this after once formally staffed in person with attending.       Alfie Aguirre MD  Resident Physician, PGY-2  [facility] Dermatology  Pager: [phone]  Office: [phone] ASSESSMENT AND PLAN:  #resolving erythrodermic drug eruption  Initial etiology favored from pantoprazole vs contrast, (diffuse redness of the skin affecting more than 90% of the body surface), other common causes of erythroderma in adults are psoriasis, atopic dermatitis -- less favored are cutaneous T-cell lymphoma (CTCL), pityriasis rubra pilaris (unlikely in this patient)  - Monitor for complications of erythroderma (fluid and electrolyte abnormalities, cardiac failure) are important, as temperature dysregulation, fluid loss, and heart failure can become a cause of mortality in patients. Secondary infections and sepsis can occur from breakdown of the skin barrier. pt has no skin denuding.  Bx 4/17: Subcorneal pustule, and a moderate superficial dermal perivascular  and interstitial mixed infiltrate with neutrophils and eosinophils. These findings can be seen in a pustular drug reaction or acute generalized exanthematous pustulosis  - Re-examination today 5/22 skin exam consistent with resolving erythrodermic AGEP as evidence by desquamative rash with other areas of active erythema.  - For these active erythematous pruritic areas can resume triamcinolone 0.1% ointment BID to affected areas (never to the face/groin/skin folds) for up to 2 weeks. Please dispense multiple tubes to cover body surface area  - Frequent thick emollient moisturizer such as plain Vaseline to areas of desquamation on all body surfaces      Patient was seen at bedside and staffed in person with the dermatology attending Dr. Migdalia Shelby  Recommendations were communicated with the primary team.  Please page [phone] for further related questions.    Alfie Aguirre MD  Resident Physician, PGY2  [facility] Dermatology  Pager: [phone]  Office: [phone].

## 2024-05-22 NOTE — PROGRESS NOTE ADULT - NSPROGADDITIONALINFOA_GEN_ALL_CORE
-Plan discussed with pt/team.  Contact info: [phone] (24/7). pager [phone]  Amion on [place]  Teams on M-T-W-F. Unavailable Thu/Weekends/Holidays  Provided face to face education as well as assessed  pt/labs/meds and discussed plan with primary team  Adjusting insulin  Discharge plan  Follow up care

## 2024-05-23 NOTE — PROGRESS NOTE ADULT - PROBLEM SELECTOR PLAN 1
- Test BG ac and hs/2am  - Continue with Lantus  22 units at hs. If BG at bedtime <100 please administer 10 units and provide snack. Will re-assess daily   - C/w Admelog dose 10 units TIDAC.  Pt to inform staff if not eating or not eating enough  - C/w Low dose admelog correction scale TIDAC, Low dose admelog correction scale QHS and 2am in case of rebound hypo/hyperglycemia    Discharge:  Will be determined according to BG/insulin needs/PO intake  at time of discharge. Basal/bolus insulin doses TBD  - Of note, patient instructed on discharge from recent hospitalization at [facility] to start farxiga for CHF. Given risks of DKA of SGLT2i in T1DM, would not start until better data is available for its benefits.  - Follow up with Dr. Luis Dumont outpatient  -Pt will likely required rehab  F/u with optho/renal/cardiac as out pt

## 2024-05-23 NOTE — PROGRESS NOTE ADULT - PROBLEM SELECTOR PLAN 5
Presented for syncope secondary to severe AS, s/p TAVR on 4/24 c/b by VT -> shock -> VFib -> shock.  Course complicated w/ symptomatic bradycardia, requiring TVP, now s/p micra PPM. TAVR well seated on recent TTE without regurgitation    - continue aspirin 81mg daily.

## 2024-05-23 NOTE — PROGRESS NOTE ADULT - PROBLEM SELECTOR PLAN 1
Likely in setting of deconditioning.  Improving with fluids.  Will attempt orthostatics daily.  Hoping to wean off midodrine in order to restart GDMT.  Orthostatics improving from supine to seated, but still problematic with standing.  On orthostatics 5/20, patient went from /74 seated to 58/39 standing, with heart rate increase from 80 to 116.  Remains orthostatic.      - leg compression  - Difficulty with abdominal binder due to body habitus   - PT rec HONEY   - OOB as tolerated   - midodrine 15mg TID.  - currently on droxydopa to 200mg TID will likely increase again today pending card recs  - Continue to evaluate standing orthostatics  - appears euvolemic 5/23

## 2024-05-23 NOTE — PROGRESS NOTE ADULT - PROBLEM SELECTOR PLAN 6
EF from 4/2024 (post-TAVR) w/ EF 25%, global LV hypokinesis, moderately dilated LV, grade 2 LV diastolic dysfunction, RA moderately dilated.  Repeat TTE with reduced LV function.  TAVR remains well seated    - cards following   - Will f/u with cardiology regarding resumption of GDMT  - continue midodrine 15mg TID   - Continue atorvastatin 80mg daily  - hold home torsemide 20mg qd  - hold home eplerenone 25mg qd  - hold home metoprolol succinate 100mg qd  - restart GDMT as tolerated  - f/u w/ EP outpatient for CRT.

## 2024-05-23 NOTE — PROGRESS NOTE ADULT - PROBLEM SELECTOR PLAN 9
TSH mildly elevated 4.79 - 5.06, FT4 1.3-1.5    - continue home levothyroxine 75mcg qd   - Recheck TSH and FT4 outpatient

## 2024-05-23 NOTE — PROGRESS NOTE ADULT - PROBLEM SELECTOR PLAN 8
Likely delayed contrast reaction vs pantoprazole. Derm biopsy consistent w/ agep. s/p nystatin and triamcinolone cream to affected debi. Improving. Now with new rash    - continue to monitor  - calamine cream, vaseline  - Restart triamcinolone  - Derm re-consulted

## 2024-05-23 NOTE — PROGRESS NOTE ADULT - SUBJECTIVE AND OBJECTIVE BOX
Chief Complaint: Diabetes Mellitus follow up    INTERVAL HX:  Patient with T1DM seen at bedside.  Reports eating well today. Finished her breakfast tray. Knows to tell staff if she is not eating not to give insulin.   BG over the last 24 hrs have been variable. Had an episode of near  hypoglycemia prebed on 5/21, then has been hyperglycemic in the [id number] yesterday and   this morning    Review of Systems:  General: As above  GI: No nausea, vomiting  Endocrine: no  S&Sx of hypoglycemia    Allergies    contrast media (iodine-based) (Fever; Vomiting; Flushing; Hypotension; Rash; Stomach Upset)  Ativan (Rash; Urticaria)  penicillins (Hives (Mod to Severe); Anaphylaxis (Mod to Severe); Short breath (Mod to Severe); Angioedema (Mod to Severe))    Intolerances      MEDICATIONS  (STANDING):  ammonium lactate 12% Lotion 1 Application(s) Topical <User Schedule>  apixaban 10 milliGRAM(s) Oral two times a day  aspirin  chewable 81 milliGRAM(s) Oral daily  atorvastatin 80 milliGRAM(s) Oral at bedtime  calamine/zinc oxide Lotion 1 Application(s) Topical three times a day  dextrose 10% Bolus 125 milliLiter(s) IV Bolus once  dextrose 5%. 1000 milliLiter(s) (100 mL/Hr) IV Continuous <Continuous>  dextrose 5%. 1000 milliLiter(s) (50 mL/Hr) IV Continuous <Continuous>  dextrose 50% Injectable 12.5 Gram(s) IV Push once  dextrose 50% Injectable 25 Gram(s) IV Push once  droxidopa 200 milliGRAM(s) Oral three times a day  epoetin mendoza-epbx (RETACRIT) Injectable 47897 Unit(s) SubCutaneous every 7 days  ferrous    sulfate 325 milliGRAM(s) Oral two times a day  glucagon  Injectable 1 milliGRAM(s) IntraMuscular once  insulin glargine Injectable (LANTUS) 22 Unit(s) SubCutaneous at bedtime  insulin lispro (ADMELOG) corrective regimen sliding scale   SubCutaneous <User Schedule>  insulin lispro (ADMELOG) corrective regimen sliding scale   SubCutaneous three times a day before meals  insulin lispro Injectable (ADMELOG) 10 Unit(s) SubCutaneous three times a day before meals  levothyroxine 75 MICROGram(s) Oral daily  midodrine. 15 milliGRAM(s) Oral three times a day  Nephro-molly 1 Tablet(s) Oral daily  senna 2 Tablet(s) Oral at bedtime  triamcinolone 0.1% Ointment 1 Application(s) Topical two times a day        atorvastatin   80 milliGRAM(s) Oral (05-22-24 @ 21:50)    insulin glargine Injectable (LANTUS)   22 Unit(s) SubCutaneous (05-22-24 @ 22:22)    insulin lispro (ADMELOG) corrective regimen sliding scale   1 Unit(s) SubCutaneous (05-23-24 @ 09:21)   3 Unit(s) SubCutaneous (05-22-24 @ 18:15)   2  SubCutaneous (05-22-24 @ 14:03)    insulin lispro Injectable (ADMELOG)   10 Unit(s) SubCutaneous (05-23-24 @ 09:21)   10 Unit(s) SubCutaneous (05-22-24 @ 18:16)   10 Unit(s) SubCutaneous (05-22-24 @ 14:00)    levothyroxine   75 MICROGram(s) Oral (05-23-24 @ 05:34)        PHYSICAL EXAM:  VITALS: T(C): 36.9 (05-23-24 @ 04:01)  T(F): 98.5 (05-23-24 @ 04:01), Max: 98.5 (05-23-24 @ 04:01)  HR: 71 (05-23-24 @ 04:01) (70 - 77)  BP: 125/70 (05-23-24 @ 04:01) (113/69 - 144/75)  RR:  (18 - 18)  SpO2:  (95% - 97%)  Wt(kg): --  GENERAL: NAD  Respiratory: Respirations unlabored   Extremities: Warm, generalized edema  NEURO: Alert , appropriate     LABS:  POCT Blood Glucose.: 173 mg/dL (05-23-24 @ 08:55)  POCT Blood Glucose.: 177 mg/dL (05-23-24 @ 03:17)  POCT Blood Glucose.: 185 mg/dL (05-22-24 @ 22:18)  POCT Blood Glucose.: 273 mg/dL (05-22-24 @ 17:45)  POCT Blood Glucose.: 209 mg/dL (05-22-24 @ 13:15)  POCT Blood Glucose.: 263 mg/dL (05-22-24 @ 09:07)  POCT Blood Glucose.: 189 mg/dL (05-22-24 @ 02:32)  POCT Blood Glucose.: 77 mg/dL (05-21-24 @ 21:42)  POCT Blood Glucose.: 76 mg/dL (05-21-24 @ 21:21)  POCT Blood Glucose.: 228 mg/dL (05-21-24 @ 17:12)  POCT Blood Glucose.: 198 mg/dL (05-21-24 @ 12:58)  POCT Blood Glucose.: 185 mg/dL (05-21-24 @ 09:10)  POCT Blood Glucose.: 168 mg/dL (05-21-24 @ 02:18)  POCT Blood Glucose.: 170 mg/dL (05-20-24 @ 21:40)  POCT Blood Glucose.: 216 mg/dL (05-20-24 @ 17:31)  POCT Blood Glucose.: 247 mg/dL (05-20-24 @ 13:20)                          10.1   8.02  )-----------( 205      ( 23 May 2024 06:48 )             33.9     05-23    136  |  101  |  38<H>  ----------------------------<  159<H>  4.0   |  24  |  2.21<H>    Ca    8.4      23 May 2024 06:48  Phos  3.9     05-23  Mg     2.0     05-23    TPro  6.0  /  Alb  2.9<L>  /  TBili  0.4  /  DBili  x   /  AST  20  /  ALT  10  /  AlkPhos  83  05-23    eGFR: 23 mL/min/1.73m2 (23 May 2024 06:48)    04-13 Chol 74 Direct LDL -- LDL calculated 25 HDL 33<L> Trig 70  Thyroid Function Tests:      A1C with Estimated Average Glucose Result: 7.4 % (03-30-24 @ 08:21)    Estimated Average Glucose: 166 mg/dL (03-30-24 @ 08:21)        Diet, Consistent Carbohydrate w/Evening Snack (05-14-24 @ 17:35) [Active]

## 2024-05-23 NOTE — PROGRESS NOTE ADULT - ATTENDING COMMENTS
73F PMH: HFrEF (EF 30%), AS, LBBB, HTN, DM1, CKD, hypothyroidism, chronic lymphedema, LELIA (3L home O2) p/w for syncope 2/2 severe AS, s/p TAVR 4/24. Course complicated by contrast induced allergic reaction (had CTA for TAVR eval) and contrast related renal failure (acute on chronic) requiring HD. TAVR c/b VT and vfib requiring shock and flash pulmonary edema causing AHRF requiring intubation.     Course complicated by contrast induced allergic reaction (had CTA for TAVR eval) and contrast related renal failure (acute on chronic) requiring CRRT for fluid removal and pressors for vasoplegia and hypovolemia due to CRRT. Patient extubated, now off pressors, CRRT.       1. Orthostatic hypotension- droxydopa to midodrine yesterday and the BP is still low so spoke with nephro and will now add florinef as well.   2. Left UE DVT- c/w eliquis. Apprciate derm for arm rash- arm looking better today  3.T1DM- uncontrolled with intermittent episodes of ble BS- . Being manged by endocrinology and currently on Lantus and ademolog. Stable in past 24 hrs  4. DUNCAN on CKD stage 4- cr stable  5. HFrEF- currently GDMT is on hold secondary to OH

## 2024-05-23 NOTE — PROGRESS NOTE ADULT - SUBJECTIVE AND OBJECTIVE BOX
Semaj Munson, PGY3  Pager [phone] [facility] or [phone] [facility]    Patient is a 73y old  Female who presents with a chief complaint of Syncope (22 May 2024 20:06)      SUBJECTIVE/INTERVAL EVENTS: Patient seen and examined at bedside.  Seen at bedside w/cardiology attending, patient continues to endorse dizziness with sitting. Otherwise no sob/cp/abd pain while laying. Patient guarded about prognosis.    MEDICATIONS  (STANDING):  ammonium lactate 12% Lotion 1 Application(s) Topical <User Schedule>  apixaban 10 milliGRAM(s) Oral two times a day  aspirin  chewable 81 milliGRAM(s) Oral daily  atorvastatin 80 milliGRAM(s) Oral at bedtime  calamine/zinc oxide Lotion 1 Application(s) Topical three times a day  dextrose 10% Bolus 125 milliLiter(s) IV Bolus once  dextrose 5%. 1000 milliLiter(s) (100 mL/Hr) IV Continuous <Continuous>  dextrose 5%. 1000 milliLiter(s) (50 mL/Hr) IV Continuous <Continuous>  dextrose 50% Injectable 12.5 Gram(s) IV Push once  dextrose 50% Injectable 25 Gram(s) IV Push once  droxidopa 200 milliGRAM(s) Oral three times a day  epoetin mendoza-epbx (RETACRIT) Injectable 60287 Unit(s) SubCutaneous every 7 days  ferrous    sulfate 325 milliGRAM(s) Oral two times a day  glucagon  Injectable 1 milliGRAM(s) IntraMuscular once  insulin glargine Injectable (LANTUS) 22 Unit(s) SubCutaneous at bedtime  insulin lispro (ADMELOG) corrective regimen sliding scale   SubCutaneous <User Schedule>  insulin lispro (ADMELOG) corrective regimen sliding scale   SubCutaneous three times a day before meals  insulin lispro Injectable (ADMELOG) 10 Unit(s) SubCutaneous three times a day before meals  levothyroxine 75 MICROGram(s) Oral daily  midodrine. 15 milliGRAM(s) Oral three times a day  Nephro-molly 1 Tablet(s) Oral daily  senna 2 Tablet(s) Oral at bedtime  triamcinolone 0.1% Ointment 1 Application(s) Topical two times a day    MEDICATIONS  (PRN):  dextrose Oral Gel 15 Gram(s) Oral once PRN Blood Glucose LESS THAN 70 milliGRAM(s)/deciliter  polyethylene glycol 3350 17 Gram(s) Oral two times a day PRN Constipation      VITAL SIGNS:  T(F): 98.5 (24 @ 04:01), Max: 98.5 (24 @ 04:01)  HR: 71 (24 @ 04:01) (70 - 77)  BP: 125/70 (24 @ 04:01) (113/69 - 144/75)  RR: 18 (24 @ 04:01) (18 - 18)  SpO2: 95% (24 @ 04:01) (95% - 97%)    I&O's Summary    22 May 2024 07:01  -  23 May 2024 07:00  --------------------------------------------------------  IN: 260 mL / OUT: 1550 mL / NET: -1290 mL      Daily     Daily Weight in k.9 (23 May 2024 04:01)    PHYSICAL EXAM:  GENERAL: NAD  HEAD:  Atraumatic, Normocephalic  EYES: EOMI, PERRL, conjunctiva and sclera clear  ENMT: Moist mucous membranes  NECK: Supple  CHEST/LUNG: Clear to auscultation bilaterally; No rales, rhonchi, wheezing, or rubs  HEART: Regular rate and rhythm, 4/6 systolic murmur  ABDOMEN: Soft, Nontender, Nondistended; Bowel sounds present  EXTREMITIES:  2+ Peripheral Pulses, 2+ b/l edema up to thighs wrapped  SKIN: generalized rash resolved, patient now with pruritic erythemic macular rash on LUE resolving  NERVOUS SYSTEM:  Alert, no focal deficits  PSYCH:  Appropriate affect      LABS:                        10.1   8.02  )-----------( 205      ( 23 May 2024 06:48 )             33.9     Hgb Trend: 10.1<--, 10.1<--, 10.0<--, 9.9<--, 10.0<--  23    136  |  101  |  38<H>  ----------------------------<  159<H>  4.0   |  24  |  2.21<H>    Ca    8.4      23 May 2024 06:48  Phos  3.9       Mg     2.0         TPro  6.0  /  Alb  2.9<L>  /  TBili  0.4  /  DBili  x   /  AST  20  /  ALT  10  /  AlkPhos  83      Creatinine Trend: 2.21<--, 2.25<--, 2.02<--, 2.10<--, 2.21<--, 2.21<--  LIVER FUNCTIONS - ( 23 May 2024 06:48 )  Alb: 2.9 g/dL / Pro: 6.0 g/dL / ALK PHOS: 83 U/L / ALT: 10 U/L / AST: 20 U/L / GGT: x                 Urinalysis Basic - ( 23 May 2024 06:48 )    Color: x / Appearance: x / SG: x / pH: x  Gluc: 159 mg/dL / Ketone: x  / Bili: x / Urobili: x   Blood: x / Protein: x / Nitrite: x   Leuk Esterase: x / RBC: x / WBC x   Sq Epi: x / Non Sq Epi: x / Bacteria: x        CAPILLARY BLOOD GLUCOSE      POCT Blood Glucose.: 177 mg/dL (23 May 2024 03:17)  POCT Blood Glucose.: 185 mg/dL (22 May 2024 22:18)  POCT Blood Glucose.: 273 mg/dL (22 May 2024 17:45)  POCT Blood Glucose.: 209 mg/dL (22 May 2024 13:15)  POCT Blood Glucose.: 263 mg/dL (22 May 2024 09:07)      RADIOLOGY & ADDITIONAL TESTS: Reviewed    Imaging Personally Reviewed:    Consultant(s) Notes Reviewed:      Care Discussed with Consultants/Other Providers:

## 2024-05-23 NOTE — PROGRESS NOTE ADULT - SUBJECTIVE AND OBJECTIVE BOX
[facility]   [phone]  RENAL FOLLOW UP NOTE  --------------------------------------------------------------------------------  Chief Complaint:    24 hour events/subjective:    patient seen and examined.   continued orthostasis    PAST HISTORY  --------------------------------------------------------------------------------  No significant changes to PMH, PSH, FHx, SHx, unless otherwise noted    ALLERGIES & MEDICATIONS  --------------------------------------------------------------------------------  Allergies    contrast media (iodine-based) (Fever; Vomiting; Flushing; Hypotension; Rash; Stomach Upset)  Ativan (Rash; Urticaria)  penicillins (Hives (Mod to Severe); Anaphylaxis (Mod to Severe); Short breath (Mod to Severe); Angioedema (Mod to Severe))    Intolerances      Standing Inpatient Medications  ammonium lactate 12% Lotion 1 Application(s) Topical <User Schedule>  apixaban 10 milliGRAM(s) Oral two times a day  aspirin  chewable 81 milliGRAM(s) Oral daily  atorvastatin 80 milliGRAM(s) Oral at bedtime  calamine/zinc oxide Lotion 1 Application(s) Topical three times a day  dextrose 10% Bolus 125 milliLiter(s) IV Bolus once  dextrose 5%. 1000 milliLiter(s) IV Continuous <Continuous>  dextrose 5%. 1000 milliLiter(s) IV Continuous <Continuous>  dextrose 50% Injectable 25 Gram(s) IV Push once  dextrose 50% Injectable 12.5 Gram(s) IV Push once  droxidopa 200 milliGRAM(s) Oral three times a day  epoetin mendoza-epbx (RETACRIT) Injectable 04761 Unit(s) SubCutaneous every 7 days  ferrous    sulfate 325 milliGRAM(s) Oral two times a day  fludroCORTISONE 0.1 milliGRAM(s) Oral daily  glucagon  Injectable 1 milliGRAM(s) IntraMuscular once  insulin glargine Injectable (LANTUS) 22 Unit(s) SubCutaneous at bedtime  insulin lispro (ADMELOG) corrective regimen sliding scale   SubCutaneous three times a day before meals  insulin lispro (ADMELOG) corrective regimen sliding scale   SubCutaneous <User Schedule>  insulin lispro Injectable (ADMELOG) 10 Unit(s) SubCutaneous three times a day before meals  levothyroxine 75 MICROGram(s) Oral daily  midodrine. 15 milliGRAM(s) Oral three times a day  Nephro-molly 1 Tablet(s) Oral daily  senna 2 Tablet(s) Oral at bedtime  triamcinolone 0.1% Ointment 1 Application(s) Topical two times a day    PRN Inpatient Medications  dextrose Oral Gel 15 Gram(s) Oral once PRN  polyethylene glycol 3350 17 Gram(s) Oral two times a day PRN      REVIEW OF SYSTEMS  --------------------------------------------------------------------------------    Gen: denies fevers/chills,  CVS: denies chest pain/palpitations  Resp: denies SOB/Cough  GI: Denies N/V/Abd pain  : Denies dysuria    VITALS/PHYSICAL EXAM  --------------------------------------------------------------------------------  T(C): 36.4 (05-23-24 @ 11:49), Max: 36.9 (05-23-24 @ 04:01)  HR: 76 (05-23-24 @ 11:49) (70 - 76)  BP: 117/64 (05-23-24 @ 11:49) (117/64 - 125/70)  RR: 18 (05-23-24 @ 11:49) (18 - 18)  SpO2: 96% (05-23-24 @ 11:49) (95% - 97%)  Wt(kg): --        05-22-24 @ 07:01  -  05-23-24 @ 07:00  --------------------------------------------------------  IN: 260 mL / OUT: 1550 mL / NET: -1290 mL      Physical Exam:  	              Gen: comfortable appearing   	Pulm: decrease bs  no rales or ronchi or wheezing  	CV: No JVD. RRR, S1S2; no rub II/VI M   	Abd: +BS, soft, nontender/nondistended, obese   	UE: Warm, no cyanosis  no clubbing, no edema  	LE: Warm, no cyanosis  no clubbing, 2+  edema,  	Neuro: alert and oriented     LABS/STUDIES  --------------------------------------------------------------------------------              10.1   8.02  >-----------<  205      [05-23-24 @ 06:48]              33.9     136  |  101  |  38  ----------------------------<  159      [05-23-24 @ 06:48]  4.0   |  24  |  2.21        Ca     8.4     [05-23-24 @ 06:48]      Mg     2.0     [05-23-24 @ 06:48]      Phos  3.9     [05-23-24 @ 06:48]    TPro  6.0  /  Alb  2.9  /  TBili  0.4  /  DBili  x   /  AST  20  /  ALT  10  /  AlkPhos  83  [05-23-24 @ 06:48]          Creatinine Trend:  SCr 2.21 [05-23 @ 06:48]  SCr 2.25 [05-22 @ 07:04]  SCr 2.02 [05-21 @ 07:20]  SCr 2.10 [05-20 @ 07:09]  SCr 2.21 [05-19 @ 06:18]                PTH -- (Ca 8.4)      [04-19-24 @ 17:54]   109  TSH 5.06      [04-14-24 @ 07:18]  Lipid: chol 74, TG 70, HDL 33, LDL --      [04-13-24 @ 07:05]

## 2024-05-23 NOTE — PROGRESS NOTE ADULT - ASSESSMENT
73F w HFrEF (EF 30%), mod AS, known LBBB, HTN, IDDM1, CKD, hypothyroidism, chronic lymphedema, suspected OHS/LELIA on 3L NC home O2 p/w 1 episode of syncope. Recent admission at [facility] (3/29-4/5) for ADHF and DUNCAN s/p diuresis, discharged with new home O2 3L NC suspecting OHS/LELIA pending outpatient w/u. Since discharge a week ago, she has been staying at home with   Pt had sob walking to car. Once in car, she was still breathing heavily. Partner noticed pt was not responding to verbal stimuli for 10-15sec and witnessed R arm jerking. Pt gained consciousness quickly without postictal symptoms. Pt went to Cardiologist Dr. Nathan who recommended pt to come to ED. No fever, cp, abd pain, n/v. Leg swelling is improved from prior. Pt on torsemide 40mg which she has been taking. Pt was discharged on 3LNC which she is currently on. Patient reports she did not take Farxiga that she was discharged on as she was concerned about side effects.     In ED, patient was found afebrile, hemodynamically stable, breathing well on 3L NC. Initial labs notable for mild hyponatremia, DUNCAN on CKD, elevated proBNP and elevated pCO2 both improved from prior.  pt also noticed with abn creatinine. had severe AS had ct scan with IV contrast had contrast nephropathy and hypotension ---> CRRT required       1- CKD IV  with DUNCAN   2- CHF   3- lymphedema   4- severe AS  s/p tavr 4/24  5- hypotension       creatinine is overall steady in this range  now developing LE edema, ACE bandage is on, she may need to resume diuretics soon   orthostatic hypotension,   continue midodrine 15 mg tid,  northera  increase to 200 mg TID   will add florinef 0.1 mg daily  continue to check orthostatic bp  also PT for conditioning   anemia, trend hgb  retacrit 10,000 units weekly  non-oliguric, off Hd   hardy --->  for urinary retention  strict I/O

## 2024-05-23 NOTE — PROGRESS NOTE ADULT - PROBLEM SELECTOR PLAN 3
Home regimen: lantus 33u qhs w/ premeal sliding scale. A1c 7.4% in 3/2024. multiple hypoglycemic episodes, DKA now resolved, anion gap closed.  Close discussions with endocrine ongoing regarding management.  [facility]    - endo following, appreciate recs   - Basal insulin per endocrine recs  - Dosing premeal insulin per endocrine recs  - LDSSI and LDSSI at night  - premeal and bedtime fingersticks  - hypoglycemia protocol.

## 2024-05-23 NOTE — PROGRESS NOTE ADULT - ASSESSMENT
73F w HFrEF (EF 30%), mod AS, known LBBB, HTN, IDDM1, CKD, hypothyroidism, chronic lymphedema, p/w 1 episode of syncope with R arm jerking.     #Syncope-Aortic Stenosis  - Known Aortic Stenosis likely Severe in setting of decreased LVEF  - s/p tavr on 4/24 c/b VT -> shock -> VFib -> shock  - hypoxic/congested, and was intubated, now extubated on 4/25 in the evening, on NC   - on 4/25 with worsening bradycardia, and now s/p TVP placement followed by AV Micra placement with removal of TVP  - Remains in Sinus Rhythm/known lbbb with intermittent   - Vaso has been weaned off with the uptitration of midodrine   - On Midodrine 15mg Q8.  - Droxidopa initiated, and increasing dose  - to consider CRT-D in the future  - Cannot initiate GDMT due to orthostasis  - would try to mobilize as best as possible and monitor orthostatics  - cont asa and statin    #Chronic Systolic HF (EF 30%), mod to severe AS, HTN, LBBB, Lymphedema   - Has known systolic HF and AS.    - Repeat TTE showed EF 30%, mild-mod LVH, mildly reduced RVF, mod-severe AS (.61 cmsq), mod pHTN  - On home Torsemide 20 mg daily, Eplerenone 25 mg daily, and Toprol  mg daily, all currently on hold  - On Midodrine 15mg TID for orthostatic hypotension  - Droxidopa now initiated as well for significant orthostasis with PT the other day  - Unable to initiate GDMT at this time due to orthostatic hypotension  - initially CVVHD, then HD  - HD now on hold, as she is urinating and creatinine remains stable  - prn iv bumex  - renal fu  - Diagnosed with UE DVT and now on full loading dose of Eliquis  - PT and Bed to chair as much as possible  - will follow with you     Discharged

## 2024-05-23 NOTE — PROGRESS NOTE ADULT - NSPROGADDITIONALINFOA_GEN_ALL_CORE
Contact via [employer] Teams during business hours  To reach covering provider access AMION via sunrise tools  For Urgent matters/after-hours/weekends/holidays please page endocrine fellow on call   For nonurgent matters please email [email or web address]    Please note that this patient may be followed by different provider tomorrow.  Notify endocrine 24 hours prior to discharge for final recommendations

## 2024-05-23 NOTE — PROGRESS NOTE ADULT - ASSESSMENT
73F w/h/o of T1DM with unknown control due to CKD and anemia of chronic disease. DM c/b CKD. Also h/o HFrEF (EF 30%), mod AS, known LBBB, HTN,  hypothyroidism, chronic lymphedema, suspected OHS/LELIA on 3L NC home O2 p/w 1 episode of syncope with R arm jerking, likely 2/2 severe symptomatic AS. S/p AVR 4/24 c/b DUNCAN on CKD, fever and hypotension. Also UTI/pul edema/ sepsis and L adrenal nodule finding.     Endocrine consult done for DM and adrenal nodule management. Tolerating POs with variable BG levels trending more toward  hyperglycemia over the last 24 hours..  insulin for dinner but eating very little. Instructed pt to report to staff if not eating or not eating enough to hold meal time insulin.Pt verbalized understanding.  Received less Lantus last night but pt states she will communicate to staff if not eating so no need to change Lantus doses for now. Spoke to RN and team about plan of care. BG goal 100 to 180s. No hypoglycemia.   Noted creat stable in 2s    Per endocrine attending Dr Burton> Adrenal nodule work up completed and pt will need to f/u once she is more stable as out pt.       Home regimen: Lantus 33 units qhs, Novolog based on sliding scale TIDAC normally 3-5 units  Has Dexcom G7

## 2024-05-23 NOTE — PROGRESS NOTE ADULT - SUBJECTIVE AND OBJECTIVE BOX
[facility] - Howard Kimble, Carlos Luong, Herman Alston  Office Number:  [phone]    Patient resting comfortably in bed in NAD.  Laying flat with no respiratory distress.  No complaints of chest pain, dyspnea, palpitations, PND, or orthopnea.  still hypotensive when standing    ROS: negative unless otherwise mentioned.    Telemetry: sr      MEDICATIONS  (STANDING):  ammonium lactate 12% Lotion 1 Application(s) Topical <User Schedule>  apixaban 10 milliGRAM(s) Oral two times a day  aspirin  chewable 81 milliGRAM(s) Oral daily  atorvastatin 80 milliGRAM(s) Oral at bedtime  calamine/zinc oxide Lotion 1 Application(s) Topical three times a day  dextrose 10% Bolus 125 milliLiter(s) IV Bolus once  dextrose 5%. 1000 milliLiter(s) (100 mL/Hr) IV Continuous <Continuous>  dextrose 5%. 1000 milliLiter(s) (50 mL/Hr) IV Continuous <Continuous>  dextrose 50% Injectable 12.5 Gram(s) IV Push once  dextrose 50% Injectable 25 Gram(s) IV Push once  droxidopa 200 milliGRAM(s) Oral three times a day  epoetin mendoza-epbx (RETACRIT) Injectable 83273 Unit(s) SubCutaneous every 7 days  ferrous    sulfate 325 milliGRAM(s) Oral two times a day  glucagon  Injectable 1 milliGRAM(s) IntraMuscular once  insulin glargine Injectable (LANTUS) 22 Unit(s) SubCutaneous at bedtime  insulin lispro (ADMELOG) corrective regimen sliding scale   SubCutaneous <User Schedule>  insulin lispro (ADMELOG) corrective regimen sliding scale   SubCutaneous three times a day before meals  insulin lispro Injectable (ADMELOG) 10 Unit(s) SubCutaneous three times a day before meals  levothyroxine 75 MICROGram(s) Oral daily  midodrine. 15 milliGRAM(s) Oral three times a day  Nephro-molly 1 Tablet(s) Oral daily  senna 2 Tablet(s) Oral at bedtime  triamcinolone 0.1% Ointment 1 Application(s) Topical two times a day    MEDICATIONS  (PRN):  dextrose Oral Gel 15 Gram(s) Oral once PRN Blood Glucose LESS THAN 70 milliGRAM(s)/deciliter  polyethylene glycol 3350 17 Gram(s) Oral two times a day PRN Constipation      Allergies    contrast media (iodine-based) (Fever; Vomiting; Flushing; Hypotension; Rash; Stomach Upset)  Ativan (Rash; Urticaria)  penicillins (Hives (Mod to Severe); Anaphylaxis (Mod to Severe); Short breath (Mod to Severe); Angioedema (Mod to Severe))    Intolerances        Vital Signs Last 24 Hrs  T(C): 36.9 (23 May 2024 04:01), Max: 36.9 (23 May 2024 04:01)  T(F): 98.5 (23 May 2024 04:01), Max: 98.5 (23 May 2024 04:01)  HR: 71 (23 May 2024 04:01) (70 - 77)  BP: 125/70 (23 May 2024 04:01) (113/69 - 144/75)  BP(mean): 98 (22 May 2024 11:21) (98 - 98)  RR: 18 (23 May 2024 04:01) (18 - 18)  SpO2: 95% (23 May 2024 04:01) (95% - 97%)    Parameters below as of 23 May 2024 04:01  Patient On (Oxygen Delivery Method): room air        I&O's Summary    22 May 2024 07:01  -  23 May 2024 07:00  --------------------------------------------------------  IN: 260 mL / OUT: 1550 mL / NET: -1290 mL        ON EXAM:    General: NAD, awake and alert, oriented x 3  HEENT: Mucous membranes are moist, anicteric  Lungs: Non-labored, breath sounds are clear bilaterally, No wheezing, rales or rhonchi  Cardiovascular: Regular, S1 and S2, 2/6 harsh systolic murmur best heard RUSB  Gastrointestinal: Bowel Sounds present, soft, nontender.   Lymph: No peripheral edema. No lymphadenopathy.  Skin: No rashes or ulcers  Psych:  Mood & affect appropriate  LABS: All Labs Reviewed:                        10.1   8.02  )-----------( 205      ( 23 May 2024 06:48 )             33.9                         10.1   8.77  )-----------( 219      ( 22 May 2024 07:05 )             33.6                         10.0   7.84  )-----------( 234      ( 21 May 2024 07:23 )             33.1     23 May 2024 06:48    136    |  101    |  38     ----------------------------<  159    4.0     |  24     |  2.21   22 May 2024 07:04    136    |  98     |  40     ----------------------------<  207    4.4     |  24     |  2.25   21 May 2024 07:20    137    |  99     |  43     ----------------------------<  172    3.7     |  23     |  2.02     Ca    8.4        23 May 2024 06:48  Ca    8.5        22 May 2024 07:04  Ca    8.9        21 May 2024 07:20  Phos  3.9       23 May 2024 06:48  Phos  3.7       22 May 2024 07:04  Phos  3.5       21 May 2024 07:20  Mg     2.0       23 May 2024 06:48  Mg     2.0       22 May 2024 07:04  Mg     2.0       21 May 2024 07:20    TPro  6.0    /  Alb  2.9    /  TBili  0.4    /  DBili  x      /  AST  20     /  ALT  10     /  AlkPhos  83     23 May 2024 06:48  TPro  6.0    /  Alb  2.9    /  TBili  0.4    /  DBili  x      /  AST  23     /  ALT  11     /  AlkPhos  84     22 May 2024 07:04  TPro  5.9    /  Alb  2.7    /  TBili  0.4    /  DBili  x      /  AST  21     /  ALT  11     /  AlkPhos  78     21 May 2024 07:20          Blood Culture:

## 2024-05-23 NOTE — DIETITIAN INITIAL EVALUATION ADULT. - PROBLEM SELECTOR PLAN 4
Called and spoke to patient. Let her know that George put lab order in and that she would need to complete prior to getting infusion. Per patient, she was called yesterday from Infusion, but missed their call. She will call them back to schedule infusion appts.   -Home regimen: lantus 35u qhs, novolog own sliding scale  -Lantus 20u qhs ordered with ISS  -Hypoglycemia protocol

## 2024-05-23 NOTE — PROGRESS NOTE ADULT - ASSESSMENT
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 Course complicated by contrast induced allergic reaction (had CTA for TAVR eval) and contrast related renal failure (acute on chronic) requiring CRRT for fluid removal and pressors for vasoplegia and hypovolemia due to CRRT. Patient now extubated, off pressors, CRRT and downgraded to medicine floors now c/b severe orthostatic hypotension

## 2024-05-23 NOTE — PROGRESS NOTE ADULT - PROBLEM SELECTOR PLAN 2
Patient has waxing and waning swelling of left upper extremity during hospitalization, per Catrina.  Noted on exam on 5/16.  DVT Duplex ultrasound Bilateral upper extremities and found to have left axial, brachial, and subclavian DVT on imaging 5/16.  Now with pruritic rash  - Begun heparin gtt (5/16), discontinued 5/17  - Continue Eliquis load 10mg BID x 7 days (5/17 - 5/23).  - Initiate Eliquis 5mg BID x 90 days for continued AC (5/24 - )  - Dermatology consult for rash rec topical steroid cream appears to be improving

## 2024-05-24 NOTE — PROGRESS NOTE ADULT - PROBLEM SELECTOR PLAN 7
U/A w/ large LE, 501 WBC, 5 RBC and mod bacteria. Urine cx growing 10-49k Vancomycin resistant enterococcus faecalis. Will hold on amoxicillin given asymptomatic nature of UTI    - s/p ceftriaxone (5/8-5/10) EF from 4/2024 (post-TAVR) w/ EF 25%, global LV hypokinesis, moderately dilated LV, grade 2 LV diastolic dysfunction, RA moderately dilated.  Repeat TTE with reduced LV function.  TAVR remains well seated    - cards following   - Will f/u with cardiology regarding resumption of GDMT  - continue midodrine 15mg TID   - Continue atorvastatin 80mg daily  - hold home torsemide 20mg qd  - hold home eplerenone 25mg qd  - hold home metoprolol succinate 100mg qd  - restart GDMT as tolerated  - f/u w/ EP outpatient for CRT.

## 2024-05-24 NOTE — CONSULT NOTE ADULT - SUBJECTIVE AND OBJECTIVE BOX
[facility]    Jac Radha Rico NP    [address] [place] [address] [phone]      Chief Complaint:  Patient is a 73y old  Female who presents with a chief complaint of Syncope (24 May 2024 13:20)      HPI:Ms Alina Chowdhury is a 74 y/o F with PMHx HFrEF (EF 30%), AS, LBBB, HTN, DM1, CKD, hypothyroidism, chronic lymphedema, LELIA (3L home O2) who presented for syncope 2/2 severe AS, now s/p TAVR  c/b by VT and Vfib requiring shock and flash pulmonary edema requiring intubation. Course complicated by contrast induced allergic reaction (had CTA for TAVR eval) and contrast related renal failure (acute on chronic) requiring CRRT for fluid removal and pressors for vasoplegia and hypovolemia due to CRRT. Patient now extubated, off pressors, CRRT and downgraded to medicine floors now c/b severe orthostatic hypotension, now with GI bleed    asked to eval by primary team  of note i am not her outpatient GI provider  she was seen as an unattached patient at an OSH    Allergies:  contrast media (iodine-based) (Fever; Vomiting; Flushing; Hypotension; Rash; Stomach Upset)  Ativan (Rash; Urticaria)  penicillins (Hives (Mod to Severe); Anaphylaxis (Mod to Severe); Short breath (Mod to Severe); Angioedema (Mod to Severe))      Medications:  ammonium lactate 12% Lotion 1 Application(s) Topical <User Schedule>  apixaban 5 milliGRAM(s) Oral two times a day  aspirin  chewable 81 milliGRAM(s) Oral daily  atorvastatin 80 milliGRAM(s) Oral at bedtime  calamine/zinc oxide Lotion 1 Application(s) Topical three times a day  dextrose 10% Bolus 125 milliLiter(s) IV Bolus once  dextrose 5%. 1000 milliLiter(s) IV Continuous <Continuous>  dextrose 5%. 1000 milliLiter(s) IV Continuous <Continuous>  dextrose 50% Injectable 25 Gram(s) IV Push once  dextrose 50% Injectable 12.5 Gram(s) IV Push once  dextrose Oral Gel 15 Gram(s) Oral once PRN  droxidopa 200 milliGRAM(s) Oral three times a day  epoetin mendoza-epbx (RETACRIT) Injectable 07368 Unit(s) SubCutaneous every 7 days  ferrous    sulfate 325 milliGRAM(s) Oral two times a day  fludroCORTISONE 0.1 milliGRAM(s) Oral daily  glucagon  Injectable 1 milliGRAM(s) IntraMuscular once  insulin glargine Injectable (LANTUS) 22 Unit(s) SubCutaneous at bedtime  insulin lispro (ADMELOG) corrective regimen sliding scale   SubCutaneous <User Schedule>  insulin lispro (ADMELOG) corrective regimen sliding scale   SubCutaneous three times a day before meals  insulin lispro Injectable (ADMELOG) 10 Unit(s) SubCutaneous three times a day before meals  levothyroxine 75 MICROGram(s) Oral daily  midodrine. 15 milliGRAM(s) Oral three times a day  Nephro-molly 1 Tablet(s) Oral daily  pantoprazole  Injectable 40 milliGRAM(s) IV Push two times a day  triamcinolone 0.1% Ointment 1 Application(s) Topical two times a day      PMHX/PSHX:  Hypertension    Diabetes    Lymphedema    H/O left bundle branch block    History of left bundle branch block (LBBB)    Systolic heart failure, chronic    Type 1 diabetes    H/O cataract    History of surgical removal of meniscus of knee    Frozen shoulder    H/O Achilles tendon repair    Fractured skull        Family history:  No pertinent family history in first degree relatives    Family history of CVA        Social History:     ROS:     General:  No wt loss, fevers, chills, night sweats, fatigue,   Eyes:  Good vision, no reported pain  ENT:  No sore throat, pain, runny nose, dysphagia  CV:  No pain, palpitations, hypo/hypertension  Resp:  No dyspnea, cough, tachypnea, wheezing  GI:  No pain, No nausea, No vomiting, No diarrhea, No constipation, No weight loss, No fever, No pruritis, No rectal bleeding, No tarry stools, No dysphagia,  :  No pain, bleeding, incontinence, nocturia  Muscle:  No pain, weakness  Neuro:  No weakness, tingling, memory problems  Psych:  No fatigue, insomnia, mood problems, depression  Endocrine:  No polyuria, polydipsia, cold/heat intolerance  Heme:  No petechiae, ecchymosis, easy bruisability  Skin:  No rash, tattoos, scars, edema      PHYSICAL EXAM:   Vital Signs:  Vital Signs Last 24 Hrs  T(C): 36.6 (24 May 2024 11:43), Max: 36.7 (23 May 2024 20:40)  T(F): 97.8 (24 May 2024 11:43), Max: 98 (23 May 2024 20:40)  HR: 87 (24 May 2024 11:43) (60 - 98)  BP: 125/74 (24 May 2024 11:43) (107/69 - 151/67)  BP(mean): --  RR: 18 (24 May 2024 11:43) (18 - 18)  SpO2: 97% (24 May 2024 11:43) (97% - 97%)    Parameters below as of 24 May 2024 11:43  Patient On (Oxygen Delivery Method): room air      Daily     Daily Weight in k.3 (24 May 2024 06:29)    GENERAL:  Appears stated age, well-groomed, well-nourished, no distress  HEENT:  NC/AT,  conjunctivae clear and pink, no thyromegaly, nodules, adenopathy, no JVD, sclera -anicteric  CHEST:  Full & symmetric excursion, no increased effort, breath sounds clear  HEART:  Regular rhythm, S1, S2, no murmur/rub/S3/S4, no abdominal bruit, no edema  ABDOMEN:  Soft, non-tender, non-distended, normoactive bowel sounds,  no masses ,no hepato-splenomegaly, no signs of chronic liver disease  EXTEREMITIES:  no cyanosis,clubbing or edema  SKIN:  No rash/erythema/ecchymoses/petechiae/wounds/abscess/warm/dry  NEURO:  Alert, oriented, no asterixis, no tremor, no encephalopathy    LABS:                        10.7   9.64  )-----------( 188      ( 24 May 2024 10:46 )             35.4     05-24    137  |  101  |  39<H>  ----------------------------<  187<H>  4.0   |  25  |  2.03<H>    Ca    8.8      24 May 2024 10:46  Phos  4.5     05-24  Mg     2.0     05-24    TPro  6.1  /  Alb  2.8<L>  /  TBili  0.5  /  DBili  x   /  AST  24  /  ALT  6<L>  /  AlkPhos  85  05-24    LIVER FUNCTIONS - ( 24 May 2024 10:46 )  Alb: 2.8 g/dL / Pro: 6.1 g/dL / ALK PHOS: 85 U/L / ALT: 6 U/L / AST: 24 U/L / GGT: x             Urinalysis Basic - ( 24 May 2024 10:46 )    Color: x / Appearance: x / SG: x / pH: x  Gluc: 187 mg/dL / Ketone: x  / Bili: x / Urobili: x   Blood: x / Protein: x / Nitrite: x   Leuk Esterase: x / RBC: x / WBC x   Sq Epi: x / Non Sq Epi: x / Bacteria: x          Imaging:

## 2024-05-24 NOTE — PROGRESS NOTE ADULT - ASSESSMENT
Ms Alina Chowdhury is a 72 y/o F with PMHx HFrEF (EF 30%), AS, LBBB, HTN, DM1, CKD, hypothyroidism, chronic lymphedema, LELIA (3L home O2) who presented for syncope 2/2 severe AS, now s/p TAVR 4/24 c/b by VT and Vfib requiring shock and flash pulmonary edema requiring intubation. Course complicated by contrast induced allergic reaction (had CTA for TAVR eval) and contrast related renal failure (acute on chronic) requiring CRRT for fluid removal and pressors for vasoplegia and hypovolemia due to CRRT. Patient now extubated, off pressors, CRRT and downgraded to medicine floors now c/b severe orthostatic hypotension Ms Alina Chowdhury is a 74 y/o F with PMHx HFrEF (EF 30%), AS, LBBB, HTN, DM1, CKD, hypothyroidism, chronic lymphedema, LELIA (3L home O2) who presented for syncope 2/2 severe AS, now s/p TAVR 4/24 c/b by VT and Vfib requiring shock and flash pulmonary edema requiring intubation. Course complicated by contrast induced allergic reaction (had CTA for TAVR eval) and contrast related renal failure (acute on chronic) requiring CRRT for fluid removal and pressors for vasoplegia and hypovolemia due to CRRT. Patient now extubated, off pressors, CRRT and downgraded to medicine floors now c/b severe orthostatic hypotension, now with GI bleed

## 2024-05-24 NOTE — PROGRESS NOTE ADULT - PROBLEM SELECTOR PLAN 12
Likely in setting of CKD vs myelosuppresion in setting of acute illnesses. No obvious source of bleed. Hgb Stable.     - daily CBC  - transfuse for goal Hgb >7  - EPO qweekly. on 3L NC at home. Not on CPAP. Hospital course complicated w/ AHRF 2/2 to flash pulm edema requiring intubation. Now extubated and stable on NC    - continue to monitor.

## 2024-05-24 NOTE — PROGRESS NOTE ADULT - PROBLEM SELECTOR PLAN 4
DUNCAN secondary to ATN from hypotension and contrast nephropathy. CKD stage 4. s/p CRRT and bumex drip in CICU.  Cr 3.08-> 3.57 -> 3.66 -> 3.48.  De La Garza for urinary retention (placed 5/10 and replaced ~5/17).  Improving and appears stable around 2.2    - f/u nephro recs   - strict I's and O's  - renally dose meds, avoid nephrotoxic agents. Home regimen: lantus 33u qhs w/ premeal sliding scale. A1c 7.4% in 3/2024. multiple hypoglycemic episodes, DKA now resolved, anion gap closed.  Close discussions with endocrine ongoing regarding management.  Hypoglycemic in AM several times now since discharge from MICU    - endo following, appreciate recs   - Basal insulin per endocrine recs  - Dosing premeal insulin per endocrine recs  - LDSSI and LDSSI at night  - premeal and bedtime fingersticks  - hypoglycemia protocol.

## 2024-05-24 NOTE — PROGRESS NOTE ADULT - SUBJECTIVE AND OBJECTIVE BOX
[facility] - Howard Kimble, Carlos Luong, Herman Alston  Office Number:  [phone]    Patient resting comfortably in bed in NAD.  Laying flat with no respiratory distress.  No complaints of chest pain, dyspnea, palpitations, PND, or orthopnea.    ROS: negative unless otherwise mentioned.    Telemetry:  SR first degree    MEDICATIONS  (STANDING):  ammonium lactate 12% Lotion 1 Application(s) Topical <User Schedule>  apixaban 5 milliGRAM(s) Oral two times a day  aspirin  chewable 81 milliGRAM(s) Oral daily  atorvastatin 80 milliGRAM(s) Oral at bedtime  calamine/zinc oxide Lotion 1 Application(s) Topical three times a day  dextrose 10% Bolus 125 milliLiter(s) IV Bolus once  dextrose 5%. 1000 milliLiter(s) (100 mL/Hr) IV Continuous <Continuous>  dextrose 5%. 1000 milliLiter(s) (50 mL/Hr) IV Continuous <Continuous>  dextrose 50% Injectable 12.5 Gram(s) IV Push once  dextrose 50% Injectable 25 Gram(s) IV Push once  droxidopa 200 milliGRAM(s) Oral three times a day  epoetin mendoza-epbx (RETACRIT) Injectable 52550 Unit(s) SubCutaneous every 7 days  ferrous    sulfate 325 milliGRAM(s) Oral two times a day  fludroCORTISONE 0.1 milliGRAM(s) Oral daily  glucagon  Injectable 1 milliGRAM(s) IntraMuscular once  insulin glargine Injectable (LANTUS) 22 Unit(s) SubCutaneous at bedtime  insulin lispro (ADMELOG) corrective regimen sliding scale   SubCutaneous <User Schedule>  insulin lispro (ADMELOG) corrective regimen sliding scale   SubCutaneous three times a day before meals  insulin lispro Injectable (ADMELOG) 10 Unit(s) SubCutaneous three times a day before meals  levothyroxine 75 MICROGram(s) Oral daily  midodrine. 15 milliGRAM(s) Oral three times a day  Nephro-molly 1 Tablet(s) Oral daily  senna 2 Tablet(s) Oral at bedtime  triamcinolone 0.1% Ointment 1 Application(s) Topical two times a day    MEDICATIONS  (PRN):  dextrose Oral Gel 15 Gram(s) Oral once PRN Blood Glucose LESS THAN 70 milliGRAM(s)/deciliter  polyethylene glycol 3350 17 Gram(s) Oral two times a day PRN Constipation      Allergies    contrast media (iodine-based) (Fever; Vomiting; Flushing; Hypotension; Rash; Stomach Upset)  Ativan (Rash; Urticaria)  penicillins (Hives (Mod to Severe); Anaphylaxis (Mod to Severe); Short breath (Mod to Severe); Angioedema (Mod to Severe))    Intolerances        Vital Signs Last 24 Hrs  T(C): 36.4 (24 May 2024 06:29), Max: 36.7 (23 May 2024 20:40)  T(F): 97.6 (24 May 2024 06:29), Max: 98 (23 May 2024 20:40)  HR: 90 (24 May 2024 06:29) (60 - 98)  BP: 132/74 (24 May 2024 06:29) (117/64 - 151/67)  BP(mean): 82 (23 May 2024 11:49) (82 - 82)  RR: 18 (24 May 2024 06:29) (18 - 18)  SpO2: 97% (24 May 2024 06:29) (96% - 97%)    Parameters below as of 24 May 2024 06:29  Patient On (Oxygen Delivery Method): room air        I&O's Summary    23 May 2024 07:01  -  24 May 2024 07:00  --------------------------------------------------------  IN: 0 mL / OUT: 900 mL / NET: -900 mL        ON EXAM:    General: NAD, awake and alert, oriented x 3  HEENT: Mucous membranes are moist, anicteric  Lungs: Non-labored, breath sounds are clear bilaterally, No wheezing, rales or rhonchi  Cardiovascular: Regular, S1 and S2, 2/6 harsh systolic murmur best heard RUSB  Gastrointestinal: Bowel Sounds present, soft, nontender.   Lymph: chronic peripheral edema. No lymphadenopathy.  Skin: No rashes or ulcers    LABS: All Labs Reviewed:                        10.1   8.02  )-----------( 205      ( 23 May 2024 06:48 )             33.9                         10.1   8.77  )-----------( 219      ( 22 May 2024 07:05 )             33.6     23 May 2024 06:48    136    |  101    |  38     ----------------------------<  159    4.0     |  24     |  2.21   22 May 2024 07:04    136    |  98     |  40     ----------------------------<  207    4.4     |  24     |  2.25     Ca    8.4        23 May 2024 06:48  Ca    8.5        22 May 2024 07:04  Phos  3.9       23 May 2024 06:48  Phos  3.7       22 May 2024 07:04  Mg     2.0       23 May 2024 06:48  Mg     2.0       22 May 2024 07:04    TPro  6.0    /  Alb  2.9    /  TBili  0.4    /  DBili  x      /  AST  20     /  ALT  10     /  AlkPhos  83     23 May 2024 06:48  TPro  6.0    /  Alb  2.9    /  TBili  0.4    /  DBili  x      /  AST  23     /  ALT  11     /  AlkPhos  84     22 May 2024 07:04          Blood Culture:

## 2024-05-24 NOTE — PROGRESS NOTE ADULT - PROBLEM SELECTOR PLAN 1
- Test BG ac and hs/2am  - Continue with Lantus 22 units at hs. If BG at bedtime <100 please administer 10 units and provide snack. Will re-assess daily   - C/w Admelog dose 10 units TIDAC.  Pt to inform staff if not eating or not eating enough  - C/w Low dose admelog correction scale TIDAC, Low dose admelog correction scale QHS and 2am in case of rebound hypo/hyperglycemia  Discharge:  Will be determined according to BG/insulin needs/PO intake  at time of discharge. Basal/bolus insulin doses TBD  - Of note, patient instructed on discharge from recent hospitalization at [facility] to start farxiga for CHF. Given risks of DKA of SGLT2i in T1DM, would not start until better data is available for its benefits.  - Follow up with Dr. Luis Dumont outpatient  -Pt will likely required rehab  F/u with optho/renal/cardiac as out pt

## 2024-05-24 NOTE — PROGRESS NOTE ADULT - SUBJECTIVE AND OBJECTIVE BOX
***************************************************************  Jairo Kaufman, PG1  Internal Medicine   Pager:  TEAMS  ***************************************************************    EVELYN LOPEZ  73y  MRN: [id number]    Patient is a 73y old  Female who presents with a chief complaint of Syncope (23 May 2024 14:18)      Interval/Overnight Events: no events ON.     Subjective: Pt seen and examined at bedside. Denies fever, CP, SOB, abn pain, N/V, dysuria. Tolerating diet.      MEDICATIONS  (STANDING):  ammonium lactate 12% Lotion 1 Application(s) Topical <User Schedule>  apixaban 5 milliGRAM(s) Oral two times a day  aspirin  chewable 81 milliGRAM(s) Oral daily  atorvastatin 80 milliGRAM(s) Oral at bedtime  calamine/zinc oxide Lotion 1 Application(s) Topical three times a day  dextrose 10% Bolus 125 milliLiter(s) IV Bolus once  dextrose 5%. 1000 milliLiter(s) (50 mL/Hr) IV Continuous <Continuous>  dextrose 5%. 1000 milliLiter(s) (100 mL/Hr) IV Continuous <Continuous>  dextrose 50% Injectable 25 Gram(s) IV Push once  dextrose 50% Injectable 12.5 Gram(s) IV Push once  droxidopa 200 milliGRAM(s) Oral three times a day  epoetin mendoza-epbx (RETACRIT) Injectable 40249 Unit(s) SubCutaneous every 7 days  ferrous    sulfate 325 milliGRAM(s) Oral two times a day  fludroCORTISONE 0.1 milliGRAM(s) Oral daily  glucagon  Injectable 1 milliGRAM(s) IntraMuscular once  insulin glargine Injectable (LANTUS) 22 Unit(s) SubCutaneous at bedtime  insulin lispro (ADMELOG) corrective regimen sliding scale   SubCutaneous three times a day before meals  insulin lispro (ADMELOG) corrective regimen sliding scale   SubCutaneous <User Schedule>  insulin lispro Injectable (ADMELOG) 10 Unit(s) SubCutaneous three times a day before meals  levothyroxine 75 MICROGram(s) Oral daily  midodrine. 15 milliGRAM(s) Oral three times a day  Nephro-molly 1 Tablet(s) Oral daily  senna 2 Tablet(s) Oral at bedtime  triamcinolone 0.1% Ointment 1 Application(s) Topical two times a day    MEDICATIONS  (PRN):  dextrose Oral Gel 15 Gram(s) Oral once PRN Blood Glucose LESS THAN 70 milliGRAM(s)/deciliter  polyethylene glycol 3350 17 Gram(s) Oral two times a day PRN Constipation      Objective:    Vitals: Vital Signs Last 24 Hrs  T(C): 36.7 (05-23-24 @ 20:40), Max: 36.7 (05-23-24 @ 20:40)  T(F): 98 (05-23-24 @ 20:40), Max: 98 (05-23-24 @ 20:40)  HR: 98 (05-23-24 @ 20:40) (60 - 98)  BP: 151/67 (05-23-24 @ 20:40) (117/64 - 151/67)  BP(mean): 82 (05-23-24 @ 11:49) (82 - 82)  RR: 18 (05-23-24 @ 20:40) (18 - 18)  SpO2: 97% (05-23-24 @ 20:40) (96% - 97%)                I&O's Summary    22 May 2024 07:01  -  23 May 2024 07:00  --------------------------------------------------------  IN: 260 mL / OUT: 1550 mL / NET: -1290 mL    23 May 2024 07:01  -  24 May 2024 06:08  --------------------------------------------------------  IN: 0 mL / OUT: 500 mL / NET: -500 mL        PHYSICAL EXAM:  GENERAL: NAD  HEAD:  Atraumatic, Normocephalic  EYES: EOMI, PERRL, conjunctiva and sclera clear  ENMT: Moist mucous membranes  NECK: Supple  CHEST/LUNG: Clear to auscultation bilaterally; No rales, rhonchi, wheezing, or rubs  HEART: Regular rate and rhythm, 4/6 systolic murmur  ABDOMEN: Soft, Nontender, Nondistended; Bowel sounds present  EXTREMITIES:  2+ Peripheral Pulses, 2+ b/l edema up to thighs wrapped  SKIN: generalized rash resolved, patient now with pruritic erythemic macular rash on LUE resolving  NERVOUS SYSTEM:  Alert, no focal deficits  PSYCH:  Appropriate affect    LABS:                        10.1   8.02  )-----------( 205      ( 23 May 2024 06:48 )             33.9                         10.1   8.77  )-----------( 219      ( 22 May 2024 07:05 )             33.6                         10.0   7.84  )-----------( 234      ( 21 May 2024 07:23 )             33.1     05-23    136  |  101  |  38<H>  ----------------------------<  159<H>  4.0   |  24  |  2.21<H>  05-22    136  |  98  |  40<H>  ----------------------------<  207<H>  4.4   |  24  |  2.25<H>  05-21    137  |  99  |  43<H>  ----------------------------<  172<H>  3.7   |  23  |  2.02<H>    Ca    8.4      23 May 2024 06:48  Ca    8.5      22 May 2024 07:04  Ca    8.9      21 May 2024 07:20  Phos  3.9     05-23  Mg     2.0     05-23    TPro  6.0  /  Alb  2.9<L>  /  TBili  0.4  /  DBili  x   /  AST  20  /  ALT  10  /  AlkPhos  83  05-23  TPro  6.0  /  Alb  2.9<L>  /  TBili  0.4  /  DBili  x   /  AST  23  /  ALT  11  /  AlkPhos  84  05-22  TPro  5.9<L>  /  Alb  2.7<L>  /  TBili  0.4  /  DBili  x   /  AST  21  /  ALT  11  /  AlkPhos  78  05-21    CAPILLARY BLOOD GLUCOSE      POCT Blood Glucose.: 121 mg/dL (24 May 2024 02:27)  POCT Blood Glucose.: 105 mg/dL (23 May 2024 21:17)  POCT Blood Glucose.: 85 mg/dL (23 May 2024 17:18)  POCT Blood Glucose.: 130 mg/dL (23 May 2024 12:47)  POCT Blood Glucose.: 173 mg/dL (23 May 2024 08:55)        Urinalysis Basic - ( 23 May 2024 06:48 )    Color: x / Appearance: x / SG: x / pH: x  Gluc: 159 mg/dL / Ketone: x  / Bili: x / Urobili: x   Blood: x / Protein: x / Nitrite: x   Leuk Esterase: x / RBC: x / WBC x   Sq Epi: x / Non Sq Epi: x / Bacteria: x          RADIOLOGY & ADDITIONAL TESTS:    Imaging Personally Reviewed:  [x ] YES  [ ] NO    Consultants involved in case:   Consultant(s) Notes Reviewed:  [ x] YES  [ ] NO:   Care Discussed with Consultants/Other Providers [x ] YES  [ ] NO         ***************************************************************  Jairo Kaufman, PG1  Internal Medicine   Pager:  TEAMS  ***************************************************************    EVELYN LOPEZ  73y  MRN: [id number]    Patient is a 73y old  Female who presents with a chief complaint of Syncope (23 May 2024 14:18)      Interval/Overnight Events: no events ON.     Subjective: Pt seen and examined at bedside. Feels better today and tolerated supine to seatedtransfer with stable blood pressures.  Had maroon colored stool this AM, positive for blood.     MEDICATIONS  (STANDING):  ammonium lactate 12% Lotion 1 Application(s) Topical <User Schedule>  apixaban 5 milliGRAM(s) Oral two times a day  aspirin  chewable 81 milliGRAM(s) Oral daily  atorvastatin 80 milliGRAM(s) Oral at bedtime  calamine/zinc oxide Lotion 1 Application(s) Topical three times a day  dextrose 10% Bolus 125 milliLiter(s) IV Bolus once  dextrose 5%. 1000 milliLiter(s) (50 mL/Hr) IV Continuous <Continuous>  dextrose 5%. 1000 milliLiter(s) (100 mL/Hr) IV Continuous <Continuous>  dextrose 50% Injectable 25 Gram(s) IV Push once  dextrose 50% Injectable 12.5 Gram(s) IV Push once  droxidopa 200 milliGRAM(s) Oral three times a day  epoetin mendoza-epbx (RETACRIT) Injectable 96578 Unit(s) SubCutaneous every 7 days  ferrous    sulfate 325 milliGRAM(s) Oral two times a day  fludroCORTISONE 0.1 milliGRAM(s) Oral daily  glucagon  Injectable 1 milliGRAM(s) IntraMuscular once  insulin glargine Injectable (LANTUS) 22 Unit(s) SubCutaneous at bedtime  insulin lispro (ADMELOG) corrective regimen sliding scale   SubCutaneous three times a day before meals  insulin lispro (ADMELOG) corrective regimen sliding scale   SubCutaneous <User Schedule>  insulin lispro Injectable (ADMELOG) 10 Unit(s) SubCutaneous three times a day before meals  levothyroxine 75 MICROGram(s) Oral daily  midodrine. 15 milliGRAM(s) Oral three times a day  Nephro-molly 1 Tablet(s) Oral daily  senna 2 Tablet(s) Oral at bedtime  triamcinolone 0.1% Ointment 1 Application(s) Topical two times a day    MEDICATIONS  (PRN):  dextrose Oral Gel 15 Gram(s) Oral once PRN Blood Glucose LESS THAN 70 milliGRAM(s)/deciliter  polyethylene glycol 3350 17 Gram(s) Oral two times a day PRN Constipation      Objective:    Vitals: Vital Signs Last 24 Hrs  T(C): 36.7 (05-23-24 @ 20:40), Max: 36.7 (05-23-24 @ 20:40)  T(F): 98 (05-23-24 @ 20:40), Max: 98 (05-23-24 @ 20:40)  HR: 98 (05-23-24 @ 20:40) (60 - 98)  BP: 151/67 (05-23-24 @ 20:40) (117/64 - 151/67)  BP(mean): 82 (05-23-24 @ 11:49) (82 - 82)  RR: 18 (05-23-24 @ 20:40) (18 - 18)  SpO2: 97% (05-23-24 @ 20:40) (96% - 97%)                I&O's Summary    22 May 2024 07:01  -  23 May 2024 07:00  --------------------------------------------------------  IN: 260 mL / OUT: 1550 mL / NET: -1290 mL    23 May 2024 07:01  -  24 May 2024 06:08  --------------------------------------------------------  IN: 0 mL / OUT: 500 mL / NET: -500 mL        PHYSICAL EXAM:  GENERAL: NAD  HEAD:  Atraumatic, Normocephalic  EYES: EOMI, PERRL, conjunctiva and sclera clear  ENMT: Moist mucous membranes  NECK: Supple  CHEST/LUNG: Clear to auscultation bilaterally; No rales, rhonchi, wheezing, or rubs  HEART: Regular rate and rhythm, 4/6 systolic murmur  ABDOMEN: Soft, Nontender, Nondistended; Bowel sounds present  EXTREMITIES:  2+ Peripheral Pulses, 2+ b/l edema up to thighs wrapped  SKIN: generalized rash resolved, patient now with pruritic erythemic macular rash on LUE resolving  NERVOUS SYSTEM:  Alert, no focal deficits  PSYCH:  Appropriate affect    LABS:                        10.1   8.02  )-----------( 205      ( 23 May 2024 06:48 )             33.9                         10.1   8.77  )-----------( 219      ( 22 May 2024 07:05 )             33.6                         10.0   7.84  )-----------( 234      ( 21 May 2024 07:23 )             33.1     05-23    136  |  101  |  38<H>  ----------------------------<  159<H>  4.0   |  24  |  2.21<H>  05-22    136  |  98  |  40<H>  ----------------------------<  207<H>  4.4   |  24  |  2.25<H>  05-21    137  |  99  |  43<H>  ----------------------------<  172<H>  3.7   |  23  |  2.02<H>    Ca    8.4      23 May 2024 06:48  Ca    8.5      22 May 2024 07:04  Ca    8.9      21 May 2024 07:20  Phos  3.9     05-23  Mg     2.0     05-23    TPro  6.0  /  Alb  2.9<L>  /  TBili  0.4  /  DBili  x   /  AST  20  /  ALT  10  /  AlkPhos  83  05-23  TPro  6.0  /  Alb  2.9<L>  /  TBili  0.4  /  DBili  x   /  AST  23  /  ALT  11  /  AlkPhos  84  05-22  TPro  5.9<L>  /  Alb  2.7<L>  /  TBili  0.4  /  DBili  x   /  AST  21  /  ALT  11  /  AlkPhos  78  05-21    CAPILLARY BLOOD GLUCOSE      POCT Blood Glucose.: 121 mg/dL (24 May 2024 02:27)  POCT Blood Glucose.: 105 mg/dL (23 May 2024 21:17)  POCT Blood Glucose.: 85 mg/dL (23 May 2024 17:18)  POCT Blood Glucose.: 130 mg/dL (23 May 2024 12:47)  POCT Blood Glucose.: 173 mg/dL (23 May 2024 08:55)        Urinalysis Basic - ( 23 May 2024 06:48 )    Color: x / Appearance: x / SG: x / pH: x  Gluc: 159 mg/dL / Ketone: x  / Bili: x / Urobili: x   Blood: x / Protein: x / Nitrite: x   Leuk Esterase: x / RBC: x / WBC x   Sq Epi: x / Non Sq Epi: x / Bacteria: x          RADIOLOGY & ADDITIONAL TESTS:    Imaging Personally Reviewed:  [x ] YES  [ ] NO    Consultants involved in case:   Consultant(s) Notes Reviewed:  [ x] YES  [ ] NO:   Care Discussed with Consultants/Other Providers [x ] YES  [ ] NO         ***************************************************************  aJiro Kaufman, PG1  Internal Medicine   Pager:  TEAMS  ***************************************************************    EVELYN LOPEZ  73y  MRN: [id number]    Patient is a 73y old  Female who presents with a chief complaint of Syncope (23 May 2024 14:18)      Interval/Overnight Events: no events ON.     Subjective: Pt seen and examined at bedside. Feels better today and tolerated supine to seated transfer with stable blood pressures.  Had maroon colored stool this AM, positive for blood.     MEDICATIONS  (STANDING):  ammonium lactate 12% Lotion 1 Application(s) Topical <User Schedule>  apixaban 5 milliGRAM(s) Oral two times a day  aspirin  chewable 81 milliGRAM(s) Oral daily  atorvastatin 80 milliGRAM(s) Oral at bedtime  calamine/zinc oxide Lotion 1 Application(s) Topical three times a day  dextrose 10% Bolus 125 milliLiter(s) IV Bolus once  dextrose 5%. 1000 milliLiter(s) (50 mL/Hr) IV Continuous <Continuous>  dextrose 5%. 1000 milliLiter(s) (100 mL/Hr) IV Continuous <Continuous>  dextrose 50% Injectable 25 Gram(s) IV Push once  dextrose 50% Injectable 12.5 Gram(s) IV Push once  droxidopa 200 milliGRAM(s) Oral three times a day  epoetin mendoza-epbx (RETACRIT) Injectable 53912 Unit(s) SubCutaneous every 7 days  ferrous    sulfate 325 milliGRAM(s) Oral two times a day  fludroCORTISONE 0.1 milliGRAM(s) Oral daily  glucagon  Injectable 1 milliGRAM(s) IntraMuscular once  insulin glargine Injectable (LANTUS) 22 Unit(s) SubCutaneous at bedtime  insulin lispro (ADMELOG) corrective regimen sliding scale   SubCutaneous three times a day before meals  insulin lispro (ADMELOG) corrective regimen sliding scale   SubCutaneous <User Schedule>  insulin lispro Injectable (ADMELOG) 10 Unit(s) SubCutaneous three times a day before meals  levothyroxine 75 MICROGram(s) Oral daily  midodrine. 15 milliGRAM(s) Oral three times a day  Nephro-molly 1 Tablet(s) Oral daily  senna 2 Tablet(s) Oral at bedtime  triamcinolone 0.1% Ointment 1 Application(s) Topical two times a day    MEDICATIONS  (PRN):  dextrose Oral Gel 15 Gram(s) Oral once PRN Blood Glucose LESS THAN 70 milliGRAM(s)/deciliter  polyethylene glycol 3350 17 Gram(s) Oral two times a day PRN Constipation      Objective:    Vitals: Vital Signs Last 24 Hrs  T(C): 36.7 (05-23-24 @ 20:40), Max: 36.7 (05-23-24 @ 20:40)  T(F): 98 (05-23-24 @ 20:40), Max: 98 (05-23-24 @ 20:40)  HR: 98 (05-23-24 @ 20:40) (60 - 98)  BP: 151/67 (05-23-24 @ 20:40) (117/64 - 151/67)  BP(mean): 82 (05-23-24 @ 11:49) (82 - 82)  RR: 18 (05-23-24 @ 20:40) (18 - 18)  SpO2: 97% (05-23-24 @ 20:40) (96% - 97%)                I&O's Summary    22 May 2024 07:01  -  23 May 2024 07:00  --------------------------------------------------------  IN: 260 mL / OUT: 1550 mL / NET: -1290 mL    23 May 2024 07:01  -  24 May 2024 06:08  --------------------------------------------------------  IN: 0 mL / OUT: 500 mL / NET: -500 mL        PHYSICAL EXAM:  GENERAL: NAD  HEAD:  Atraumatic, Normocephalic  EYES: EOMI, PERRL, conjunctiva and sclera clear  ENMT: Moist mucous membranes  NECK: Supple  CHEST/LUNG: Clear to auscultation bilaterally; No rales, rhonchi, wheezing, or rubs  HEART: Regular rate and rhythm, 4/6 systolic murmur  ABDOMEN: Soft, Nontender, Nondistended; Bowel sounds present  EXTREMITIES:  2+ Peripheral Pulses, 2+ b/l edema up to thighs wrapped  SKIN: generalized rash resolved, patient now with pruritic erythemic macular rash on LUE resolving  NERVOUS SYSTEM:  Alert, no focal deficits  PSYCH:  Appropriate affect    LABS:                        10.1   8.02  )-----------( 205      ( 23 May 2024 06:48 )             33.9                         10.1   8.77  )-----------( 219      ( 22 May 2024 07:05 )             33.6                         10.0   7.84  )-----------( 234      ( 21 May 2024 07:23 )             33.1     05-23    136  |  101  |  38<H>  ----------------------------<  159<H>  4.0   |  24  |  2.21<H>  05-22    136  |  98  |  40<H>  ----------------------------<  207<H>  4.4   |  24  |  2.25<H>  05-21    137  |  99  |  43<H>  ----------------------------<  172<H>  3.7   |  23  |  2.02<H>    Ca    8.4      23 May 2024 06:48  Ca    8.5      22 May 2024 07:04  Ca    8.9      21 May 2024 07:20  Phos  3.9     05-23  Mg     2.0     05-23    TPro  6.0  /  Alb  2.9<L>  /  TBili  0.4  /  DBili  x   /  AST  20  /  ALT  10  /  AlkPhos  83  05-23  TPro  6.0  /  Alb  2.9<L>  /  TBili  0.4  /  DBili  x   /  AST  23  /  ALT  11  /  AlkPhos  84  05-22  TPro  5.9<L>  /  Alb  2.7<L>  /  TBili  0.4  /  DBili  x   /  AST  21  /  ALT  11  /  AlkPhos  78  05-21    CAPILLARY BLOOD GLUCOSE      POCT Blood Glucose.: 121 mg/dL (24 May 2024 02:27)  POCT Blood Glucose.: 105 mg/dL (23 May 2024 21:17)  POCT Blood Glucose.: 85 mg/dL (23 May 2024 17:18)  POCT Blood Glucose.: 130 mg/dL (23 May 2024 12:47)  POCT Blood Glucose.: 173 mg/dL (23 May 2024 08:55)        Urinalysis Basic - ( 23 May 2024 06:48 )    Color: x / Appearance: x / SG: x / pH: x  Gluc: 159 mg/dL / Ketone: x  / Bili: x / Urobili: x   Blood: x / Protein: x / Nitrite: x   Leuk Esterase: x / RBC: x / WBC x   Sq Epi: x / Non Sq Epi: x / Bacteria: x          RADIOLOGY & ADDITIONAL TESTS:    Imaging Personally Reviewed:  [x ] YES  [ ] NO    Consultants involved in case:   Consultant(s) Notes Reviewed:  [ x] YES  [ ] NO:   Care Discussed with Consultants/Other Providers [x ] YES  [ ] NO

## 2024-05-24 NOTE — OCCUPATIONAL THERAPY INITIAL EVALUATION ADULT - LEVEL OF INDEPENDENCE: SIT/SUPINE, REHAB EVAL
Venipuncture Paragraph: An alcohol pad was applied to the venipuncture site. Venipuncture was performed using a butterfly needle. Pressure and a bandaid was applied to the site. No complications were noted.
Number Of Tubes Drawn: 2
Detail Level: None
minimum assist (75% patients effort)

## 2024-05-24 NOTE — PROGRESS NOTE ADULT - ASSESSMENT
73F w/h/o of T1DM with unknown control due to CKD and anemia of chronic disease. DM c/b CKD. Also h/o HFrEF (EF 30%), mod AS, known LBBB, HTN,  hypothyroidism, chronic lymphedema, suspected OHS/LELIA on 3L NC home O2 p/w 1 episode of syncope with R arm jerking, likely 2/2 severe symptomatic AS. S/p AVR 4/24 c/b DUNCAN on CKD, fever and hypotension. Also UTI/pul edema/ sepsis and L adrenal nodule finding. Also orthostatic hypotension > now on Florinef. BG levels variable due to variable and unpredictable PO intake. Unable to adjust insulin doses at this time but pt aware to reports to staff if not eating and if BG at bedtime is low pt can received lower Lantus dose.  BG goal 100 to 180s. No hypoglycemia.   Noted creat stable in 2s    Per endocrine attending Dr Burton> Adrenal nodule work up completed and pt will need to f/u once she is more stable as out pt.       Home regimen: Lantus 33 units qhs, Novolog based on sliding scale TIDAC normally 3-5 units  Has Dexcom G7

## 2024-05-24 NOTE — PROGRESS NOTE ADULT - PROBLEM SELECTOR PLAN 5
Presented for syncope secondary to severe AS, s/p TAVR on 4/24 c/b by VT -> shock -> VFib -> shock.  Course complicated w/ symptomatic bradycardia, requiring TVP, now s/p micra PPM. TAVR well seated on recent TTE without regurgitation    - continue aspirin 81mg daily. DUNCAN secondary to ATN from hypotension and contrast nephropathy. CKD stage 4. s/p CRRT and bumex drip in CICU.  Cr 3.08-> 3.57 -> 3.66 -> 3.48.  De [place] Garza for urinary retention (placed 5/10 and replaced ~5/17).  Improving and appears stable around 2.2    - f/u nephro recs   - strict I's and O's  - renally dose meds, avoid nephrotoxic agents.

## 2024-05-24 NOTE — PROGRESS NOTE ADULT - PROBLEM SELECTOR PLAN 6
EF from 4/2024 (post-TAVR) w/ EF 25%, global LV hypokinesis, moderately dilated LV, grade 2 LV diastolic dysfunction, RA moderately dilated.  Repeat TTE with reduced LV function.  TAVR remains well seated    - cards following   - Will f/u with cardiology regarding resumption of GDMT  - continue midodrine 15mg TID   - Continue atorvastatin 80mg daily  - hold home torsemide 20mg qd  - hold home eplerenone 25mg qd  - hold home metoprolol succinate 100mg qd  - restart GDMT as tolerated  - f/u w/ EP outpatient for CRT. Presented for syncope secondary to severe AS, s/p TAVR on 4/24 c/b by VT -> shock -> VFib -> shock.  Course complicated w/ symptomatic bradycardia, requiring TVP, now s/p micra PPM. TAVR well seated on recent TTE without regurgitation    - continue aspirin 81mg daily.

## 2024-05-24 NOTE — PROVIDER CONTACT NOTE (OTHER) - ASSESSMENT
Patient A&Ox4. Large maroon colored stool noted. Patient denies chest pain, lightheadedness, shortness of breath, or weakness.

## 2024-05-24 NOTE — PROGRESS NOTE ADULT - PROBLEM SELECTOR PLAN 2
Patient has waxing and waning swelling of left upper extremity during hospitalization, per Catrina.  Noted on exam on 5/16.  DVT Duplex ultrasound Bilateral upper extremities and found to have left axial, brachial, and subclavian DVT on imaging 5/16.  Now with pruritic rash  - Begun heparin gtt (5/16), discontinued 5/17  - Eliquis load 10mg BID x 7 days completed (5/17 - 5/23).  - Initiate Eliquis 5mg BID x 90 days for continued AC (5/24 - )  - Dermatology consult for rash rec topical steroid cream appears to be improving Likely in setting of deconditioning.  Improving with fluids.  Will attempt orthostatics daily.  Hoping to wean off midodrine in order to restart GDMT.  Orthostatics improving from supine to seated, but still problematic with standing.  On orthostatics 5/20, patient went from /74 seated to 58/39 standing, with heart rate increase from 80 to 116.  Remains orthostatic.      - leg compression  - Difficulty with abdominal binder due to body habitus   - PT rec HONEY   - OOB as tolerated   - midodrine 15mg TID.  - droxydopa 200mg TID  - fludrocortisone 0.1mg daily  - Continue to evaluate standing orthostatics  - appears euvolemic 5/23

## 2024-05-24 NOTE — PROVIDER CONTACT NOTE (OTHER) - ASSESSMENT
Pt is A&Ox4. on RA. NSR on tele. Pt denies chest pain, SOB, or other discomforts. Pt ate dinner prior.

## 2024-05-24 NOTE — PROGRESS NOTE ADULT - PROBLEM SELECTOR PLAN 10
Chronic lymphedema w/ wounds.     - elevate legs as tolerated, encourage ambulation  - compression stockings, ace wrapping  - f/u wound care recs. TSH mildly elevated 4.79 - 5.06, FT4 1.3-1.5    - continue home levothyroxine 75mcg qd   - Recheck TSH and FT4 outpatient

## 2024-05-24 NOTE — PROGRESS NOTE ADULT - NS ATTEND OPT1A GEN_ALL_CORE
History/Exam/Medical decision making
Exam/Medical decision making
History/Exam/Medical decision making
Exam/Medical decision making
History/Exam/Medical decision making
History/Exam/Medical decision making
Medical decision making
History/Exam/Medical decision making

## 2024-05-24 NOTE — PROGRESS NOTE ADULT - NSPROGADDITIONALINFOA_GEN_ALL_CORE
-Plan discussed with pt/team.  Contact info: [phone] (24/7). pager [phone]  Amion on [place]  Teams on M-T-W-F. Unavailable Thu/Weekends/Holidays  Assessed pt/labs/meds and discussed plan of care with primary team  Adjusting insulin  Discharge plan  Follow up care

## 2024-05-24 NOTE — OCCUPATIONAL THERAPY INITIAL EVALUATION ADULT - LEVEL OF INDEPENDENCE: BED TO CHAIR, REHAB EVAL
scooted from bed to chair/unable to perform scooted from bed to chair/minimum assist (75% patients effort)

## 2024-05-24 NOTE — PROGRESS NOTE ADULT - PROBLEM SELECTOR PLAN 9
TSH mildly elevated 4.79 - 5.06, FT4 1.3-1.5    - continue home levothyroxine 75mcg qd   - Recheck TSH and FT4 outpatient Likely delayed contrast reaction vs pantoprazole. Derm biopsy consistent w/ agep. s/p nystatin and triamcinolone cream to affected debi. Improving. Now with new rash    - continue to monitor  - calamine cream, vaseline  - Restart triamcinolone  - Derm re-consulted

## 2024-05-24 NOTE — PROGRESS NOTE ADULT - ATTENDING COMMENTS
73F PMH: HFrEF (EF 30%), AS, LBBB, HTN, DM1, CKD, hypothyroidism, chronic lymphedema, LELIA (3L home O2) p/w for syncope 2/2 severe AS, s/p TAVR 4/24. Course complicated by contrast induced allergic reaction (had CTA for TAVR eval) and contrast related renal failure (acute on chronic) requiring HD. TAVR c/b VT and vfib requiring shock and flash pulmonary edema causing AHRF requiring intubation.     Course complicated by contrast induced allergic reaction (had CTA for TAVR eval) and contrast related renal failure (acute on chronic) requiring CRRT for fluid removal and pressors for vasoplegia and hypovolemia due to CRRT. Patient extubated, now off pressors, CRRT.       1.Melena- Presumed UGIB wihile on eliquis and ASA. CBC stable. GI consult, protonix. Continue with AC for now. Discontinue bowel regimen   2. Orthostatic hypotension- droxydopa, midodrine with florinef added yesterday- today BP is best has been in a while  3. Left UE DVT- c/w eliquis. Apprciate derm for arm rash- arm looking better today  4.T1DM- uncontrolled with intermittent episodes of labile BS- . Being manged by endocrinology and currently on Lantus and ademolog.   5. DUNCAN on CKD stage 4- cr stable  6. HFrEF- currently GDMT is on hold secondary to OH

## 2024-05-24 NOTE — PROGRESS NOTE ADULT - PROBLEM SELECTOR PLAN 11
on 3L NC at home. Not on CPAP. Hospital course complicated w/ AHRF 2/2 to flash pulm edema requiring intubation. Now extubated and stable on NC    - continue to monitor. Chronic lymphedema w/ wounds.     - elevate legs as tolerated, encourage ambulation  - compression stockings, ace wrapping  - f/u wound care recs.

## 2024-05-24 NOTE — PROGRESS NOTE ADULT - PROBLEM SELECTOR PLAN 8
Likely delayed contrast reaction vs pantoprazole. Derm biopsy consistent w/ agep. s/p nystatin and triamcinolone cream to affected debi. Improving. Now with new rash    - continue to monitor  - calamine cream, vaseline  - Restart triamcinolone  - Derm re-consulted U/A w/ large LE, 501 WBC, 5 RBC and mod bacteria. Urine cx growing 10-49k Vancomycin resistant enterococcus faecalis. Will hold on amoxicillin given asymptomatic nature of UTI    - s/p ceftriaxone (5/8-5/10)

## 2024-05-24 NOTE — PROGRESS NOTE ADULT - PROBLEM SELECTOR PLAN 3
Home regimen: lantus 33u qhs w/ premeal sliding scale. A1c 7.4% in 3/2024. multiple hypoglycemic episodes, DKA now resolved, anion gap closed.  Close discussions with endocrine ongoing regarding management.  Hypoglycemic in AM several times now since discharge from MICU    - endo following, appreciate recs   - Basal insulin per endocrine recs  - Dosing premeal insulin per endocrine recs  - LDSSI and LDSSI at night  - premeal and bedtime fingersticks  - hypoglycemia protocol. Patient has waxing and waning swelling of left upper extremity during hospitalization, per Catrina.  Noted on exam on 5/16.  DVT Duplex ultrasound Bilateral upper extremities and found to have left axial, brachial, and subclavian DVT on imaging 5/16.  Now with pruritic rash  - Begun heparin gtt (5/16), discontinued 5/17  - Eliquis load 10mg BID x 7 days completed (5/17 - 5/23).  - Initiate Eliquis 5mg BID x 90 days for continued AC (5/24 - )  - Dermatology consult for rash rec topical steroid cream appears to be improving

## 2024-05-24 NOTE — PROGRESS NOTE ADULT - SUBJECTIVE AND OBJECTIVE BOX
DIABETES FOLLOW UP NOTE: Saw pt earlier today    Chief Complaint: Endocrine consult requested for management of DM    INTERVAL HX: Pt stable, reports tolerating POs but remains with variable PO intake. Some time not eating much and some times eating most of a meal. BG levels 80s to 200s in the last 24hours. Noted  last  night and pt received 50% of Lantus with FBG 170s. The prior night pt received full Lantus dose with same fasting BG as today.  Creat at 2s. Florinef started yesterday for orthostatic hypotension    Review of Systems:  General: As above  Cardiovascular: No chest pain, palpitations  Respiratory: No SOB, no cough  GI: No nausea, vomiting, abdominal pain  Endocrine: No polyuria, polydipsia or S&Sx of hypoglycemia    Allergies    contrast media (iodine-based) (Fever; Vomiting; Flushing; Hypotension; Rash; Stomach Upset)  Ativan (Rash; Urticaria)  penicillins (Hives (Mod to Severe); Anaphylaxis (Mod to Severe); Short breath (Mod to Severe); Angioedema (Mod to Severe))    Intolerances      MEDICATIONS:  atorvastatin 80 milliGRAM(s) Oral at bedtime  fludroCORTISONE 0.1 milliGRAM(s) Oral daily  insulin glargine Injectable (LANTUS) 22 Unit(s) SubCutaneous at bedtime  insulin lispro (ADMELOG) corrective regimen sliding scale   SubCutaneous three times a day before meals  insulin lispro (ADMELOG) corrective regimen sliding scale   SubCutaneous <User Schedule>  insulin lispro Injectable (ADMELOG) 10 Unit(s) SubCutaneous three times a day before meals  levothyroxine 75 MICROGram(s) Oral daily      PHYSICAL EXAM:  VITALS: T(C): 36.6 (05-24-24 @ 11:43)  T(F): 97.8 (05-24-24 @ 11:43), Max: 98 (05-23-24 @ 20:40)  HR: 87 (05-24-24 @ 11:43) (60 - 98)  BP: 125/74 (05-24-24 @ 11:43) (107/69 - 151/67)  RR:  (18 - 18)  SpO2:  (97% - 97%)  Wt(kg): --  GENERAL: Female laying in bed in NAD. Relative at bedside  Abdomen: Soft, nontender, non distended. + obesity  Extremities: Warm,+ edema in LEs with dressings on D&I.   NEURO: A&O X3      LABS:  POCT Blood Glucose.: 231 mg/dL (05-24-24 @ 13:01)  POCT Blood Glucose.: 173 mg/dL (05-24-24 @ 09:10)  POCT Blood Glucose.: 121 mg/dL (05-24-24 @ 02:27)  POCT Blood Glucose.: 105 mg/dL (05-23-24 @ 21:17)  POCT Blood Glucose.: 85 mg/dL (05-23-24 @ 17:18)  POCT Blood Glucose.: 130 mg/dL (05-23-24 @ 12:47)  POCT Blood Glucose.: 173 mg/dL (05-23-24 @ 08:55)  POCT Blood Glucose.: 177 mg/dL (05-23-24 @ 03:17)  POCT Blood Glucose.: 185 mg/dL (05-22-24 @ 22:18)  POCT Blood Glucose.: 273 mg/dL (05-22-24 @ 17:45)  POCT Blood Glucose.: 209 mg/dL (05-22-24 @ 13:15)  POCT Blood Glucose.: 263 mg/dL (05-22-24 @ 09:07)  POCT Blood Glucose.: 189 mg/dL (05-22-24 @ 02:32)  POCT Blood Glucose.: 77 mg/dL (05-21-24 @ 21:42)  POCT Blood Glucose.: 76 mg/dL (05-21-24 @ 21:21)  POCT Blood Glucose.: 228 mg/dL (05-21-24 @ 17:12)                            10.7   9.64  )-----------( 188      ( 24 May 2024 10:46 )             35.4       05-24    137  |  101  |  39<H>  ----------------------------<  187<H>  4.0   |  25  |  2.03<H>    eGFR: 26<L>    Ca    8.8      05-24  Mg     2.0     05-24  Phos  4.5     05-24    TPro  6.1  /  Alb  2.8<L>  /  TBili  0.5  /  DBili  x   /  AST  24  /  ALT  6<L>  /  AlkPhos  85  05-24    A1C with Estimated Average Glucose Result: 7.4 % (03-30-24 @ 08:21)      Estimated Average Glucose: 166 mg/dL (03-30-24 @ 08:21)        04-13 Chol 74 Direct LDL -- LDL calculated 25 HDL 33<L> Trig 70

## 2024-05-24 NOTE — OCCUPATIONAL THERAPY INITIAL EVALUATION ADULT - DIAGNOSIS, OT EVAL
Pt presents with decreased endurance, decrease dynamic standing balance and decrease BLE strength impacting functional mobility and ADLs.

## 2024-05-24 NOTE — PROGRESS NOTE ADULT - ASSESSMENT
73F w HFrEF (EF 30%), mod AS, known LBBB, HTN, IDDM1, CKD, hypothyroidism, chronic lymphedema, p/w 1 episode of syncope with R arm jerking.     #Syncope-Aortic Stenosis  - Known Aortic Stenosis likely Severe in setting of decreased LVEF  - s/p tavr on 4/24 c/b VT -> shock -> VFib -> shock  - hypoxic/congested, and was intubated, now extubated on 4/25 in the evening, on NC   - on 4/25 with worsening bradycardia, and now s/p TVP placement followed by AV Micra placement with removal of TVP  - Remains in Sinus Rhythm/known lbbb with intermittent   - Vaso has been weaned off with the uptitration of midodrine   - On Midodrine 15mg Q8.  - Droxidopa initiated, and increasing dose  - to consider CRT-D in the future  - Cannot initiate GDMT due to orthostasis  - would try to mobilize as best as possible and monitor orthostatics  - cont asa and statin    #Chronic Systolic HF (EF 30%), mod to severe AS, HTN, LBBB, Lymphedema   - Has known systolic HF and AS.    - Repeat TTE showed EF 30%, mild-mod LVH, mildly reduced RVF, mod-severe AS (.61 cmsq), mod pHTN  - On home Torsemide 20 mg daily, Eplerenone 25 mg daily, and Toprol  mg daily, all currently on hold  - On Midodrine 15mg TID for orthostatic hypotension  - Droxidopa now initiated as well for significant orthostasis with PT the other day  - Still orthostatic, so florinef added as well  - Unable to initiate GDMT at this time due to significant orthostatic hypotension  - initially CVVHD, then HD  - HD now on hold, as she is urinating and creatinine remains stable  - prn iv bumex  - renal fu  - Diagnosed with UE DVT and now on full loading dose of Eliquis  - PT and Bed to chair as much as possible  - will follow with you

## 2024-05-24 NOTE — CONSULT NOTE ADULT - ASSESSMENT
gi bleed  afib  heart failure  dvt    hgb is stable  cont reg diet  check repeat cbc tonight  if stable ok to cont a/c  if she cont to bleed or hgb declines would hold a/c  d/w patient  d/w primary

## 2024-05-24 NOTE — PROGRESS NOTE ADULT - PROBLEM SELECTOR PLAN 1
Likely in setting of deconditioning.  Improving with fluids.  Will attempt orthostatics daily.  Hoping to wean off midodrine in order to restart GDMT.  Orthostatics improving from supine to seated, but still problematic with standing.  On orthostatics 5/20, patient went from /74 seated to 58/39 standing, with heart rate increase from 80 to 116.  Remains orthostatic.      - leg compression  - Difficulty with abdominal binder due to body habitus   - PT rec HONEY   - OOB as tolerated   - midodrine 15mg TID.  - droxydopa 200mg TID  - fludrocortisone 0.1mg daily  - Continue to evaluate standing orthostatics  - appears euvolemic 5/23 Patient had maroon-colored stool on 5/24, positive for blood.  Hemodynamically stable with stable hemoglobin  - GI outpatient Dr Hansel Luna consulted, appreciate recs  - Pantoprazole 40mg IV BID  - D/c bowel regimen Patient had maroon-colored stool on 5/24, positive for blood.  Hemodynamically stable with stable hemoglobin  - GI outpatient Dr Hansel Luna consulted, appreciate recs  - Pantoprazole 40mg IV BID  - D/c bowel regimen  - Will continue Eliquis unless repeat CBC tonight drops  - Continue diet

## 2024-05-24 NOTE — PROGRESS NOTE ADULT - SUBJECTIVE AND OBJECTIVE BOX
[facility]   [phone]  RENAL FOLLOW UP NOTE  --------------------------------------------------------------------------------  Chief Complaint:    24 hour events/subjective:    patient seen and examined.   denies sob    PAST HISTORY  --------------------------------------------------------------------------------  No significant changes to PMH, PSH, FHx, SHx, unless otherwise noted    ALLERGIES & MEDICATIONS  --------------------------------------------------------------------------------  Allergies    contrast media (iodine-based) (Fever; Vomiting; Flushing; Hypotension; Rash; Stomach Upset)  Ativan (Rash; Urticaria)  penicillins (Hives (Mod to Severe); Anaphylaxis (Mod to Severe); Short breath (Mod to Severe); Angioedema (Mod to Severe))    Intolerances      Standing Inpatient Medications  ammonium lactate 12% Lotion 1 Application(s) Topical <User Schedule>  apixaban 5 milliGRAM(s) Oral two times a day  aspirin  chewable 81 milliGRAM(s) Oral daily  atorvastatin 80 milliGRAM(s) Oral at bedtime  calamine/zinc oxide Lotion 1 Application(s) Topical three times a day  dextrose 10% Bolus 125 milliLiter(s) IV Bolus once  dextrose 5%. 1000 milliLiter(s) IV Continuous <Continuous>  dextrose 5%. 1000 milliLiter(s) IV Continuous <Continuous>  dextrose 50% Injectable 12.5 Gram(s) IV Push once  dextrose 50% Injectable 25 Gram(s) IV Push once  droxidopa 200 milliGRAM(s) Oral three times a day  epoetin mendoza-epbx (RETACRIT) Injectable 75763 Unit(s) SubCutaneous every 7 days  ferrous    sulfate 325 milliGRAM(s) Oral two times a day  fludroCORTISONE 0.1 milliGRAM(s) Oral daily  glucagon  Injectable 1 milliGRAM(s) IntraMuscular once  insulin glargine Injectable (LANTUS) 22 Unit(s) SubCutaneous at bedtime  insulin lispro (ADMELOG) corrective regimen sliding scale   SubCutaneous <User Schedule>  insulin lispro (ADMELOG) corrective regimen sliding scale   SubCutaneous three times a day before meals  insulin lispro Injectable (ADMELOG) 10 Unit(s) SubCutaneous three times a day before meals  levothyroxine 75 MICROGram(s) Oral daily  midodrine. 15 milliGRAM(s) Oral three times a day  Nephro-molly 1 Tablet(s) Oral daily  pantoprazole  Injectable 40 milliGRAM(s) IV Push two times a day  triamcinolone 0.1% Ointment 1 Application(s) Topical two times a day    PRN Inpatient Medications  dextrose Oral Gel 15 Gram(s) Oral once PRN      REVIEW OF SYSTEMS  --------------------------------------------------------------------------------    Gen: denies fevers/chills,  CVS: denies chest pain/palpitations  Resp: denies SOB/Cough  GI: Denies N/V/Abd pain  : Denies dysuria    VITALS/PHYSICAL EXAM  --------------------------------------------------------------------------------  T(C): 36.6 (05-24-24 @ 11:43), Max: 36.7 (05-23-24 @ 20:40)  HR: 87 (05-24-24 @ 11:43) (60 - 98)  BP: 125/74 (05-24-24 @ 11:43) (107/69 - 151/67)  RR: 18 (05-24-24 @ 11:43) (18 - 18)  SpO2: 97% (05-24-24 @ 11:43) (97% - 97%)  Wt(kg): --        05-23-24 @ 07:01  -  05-24-24 @ 07:00  --------------------------------------------------------  IN: 0 mL / OUT: 900 mL / NET: -900 mL      Physical Exam:  	              Gen: comfortable appearing   	Pulm: decrease bs  no rales or ronchi or wheezing  	CV: No JVD. RRR, S1S2; no rub II/VI M   	Abd: +BS, soft, nontender/nondistended, obese   	UE: Warm, no cyanosis  no clubbing, no edema  	LE: Warm, no cyanosis  no clubbing, 2+  edema,  	Neuro: alert and oriented       LABS/STUDIES  --------------------------------------------------------------------------------              10.7   9.64  >-----------<  188      [05-24-24 @ 10:46]              35.4     137  |  101  |  39  ----------------------------<  187      [05-24-24 @ 10:46]  4.0   |  25  |  2.03        Ca     8.8     [05-24-24 @ 10:46]      Mg     2.0     [05-24-24 @ 10:46]      Phos  4.5     [05-24-24 @ 10:46]    TPro  6.1  /  Alb  2.8  /  TBili  0.5  /  DBili  x   /  AST  24  /  ALT  6   /  AlkPhos  85  [05-24-24 @ 10:46]          Creatinine Trend:  SCr 2.03 [05-24 @ 10:46]  SCr 2.21 [05-23 @ 06:48]  SCr 2.25 [05-22 @ 07:04]  SCr 2.02 [05-21 @ 07:20]  SCr 2.10 [05-20 @ 07:09]            PTH -- (Ca 8.4)      [04-19-24 @ 17:54]   109  TSH 5.06      [04-14-24 @ 07:18]  Lipid: chol 74, TG 70, HDL 33, LDL --      [04-13-24 @ 07:05]

## 2024-05-24 NOTE — OCCUPATIONAL THERAPY INITIAL EVALUATION ADULT - PERTINENT HX OF CURRENT PROBLEM, REHAB EVAL
Patient is a 73y old  Female who presents with a chief complaint of Syncope , admitted on 4/12/2024.VT and Vfib requiring shock and flash pulmonary edema requiring intubation. Patient now extubated, off pressors, CRRT and downgraded to medicine floors now c/b severe orthostatic hypotension. Orthostatics improving from supine to seated, but still problematic with standing ( 5/23). Patient is a 73y old  Female who presents with a chief complaint of Syncope , admitted on 4/12/2024.VT and Vfib requiring shock and flash pulmonary edema requiring intubation. Patient now extubated, off pressors, CRRT and downgraded to medicine floors now c/b severe orthostatic hypotension. Orthostatics improving from supine to seated, but still problematic with standing ( 5/23).    Echo performed (5/21/24) findings: cardiac murmur   CT Abdomin (5/3/24) (  findings: intracardiac blood pooling suggesting anemia

## 2024-05-24 NOTE — PROGRESS NOTE ADULT - ASSESSMENT
73F w HFrEF (EF 30%), mod AS, known LBBB, HTN, IDDM1, CKD, hypothyroidism, chronic lymphedema, suspected OHS/LELIA on 3L NC home O2 p/w 1 episode of syncope. Recent admission at [facility] (3/29-4/5) for ADHF and DUNCAN s/p diuresis, discharged with new home O2 3L NC suspecting OHS/LELIA pending outpatient w/u. Since discharge a week ago, she has been staying at home with   Pt had sob walking to car. Once in car, she was still breathing heavily. Partner noticed pt was not responding to verbal stimuli for 10-15sec and witnessed R arm jerking. Pt gained consciousness quickly without postictal symptoms. Pt went to Cardiologist Dr. Nathan who recommended pt to come to ED. No fever, cp, abd pain, n/v. Leg swelling is improved from prior. Pt on torsemide 40mg which she has been taking. Pt was discharged on 3LNC which she is currently on. Patient reports she did not take Farxiga that she was discharged on as she was concerned about side effects.     In ED, patient was found afebrile, hemodynamically stable, breathing well on 3L NC. Initial labs notable for mild hyponatremia, DUNCAN on CKD, elevated proBNP and elevated pCO2 both improved from prior.  pt also noticed with abn creatinine. had severe AS had ct scan with IV contrast had contrast nephropathy and hypotension ---> CRRT required       1- CKD IV  with DUNCAN   2- CHF   3- lymphedema   4- severe AS  s/p tavr 4/24  5- hypotension       creatinine is overall steady in this range  now developing LE edema, ACE bandage is on, she may need to resume diuretics soon   orthostatic hypotension,   continue midodrine 15 mg tid,  northera 200 mg TID   florinef 0.1 mg daily  continue to check orthostatic bp  also PT for conditioning   anemia, trend hgb  retacrit 10,000 units weekly  non-oliguric, off Hd   hardy --->  for urinary retention  ?TOV  strict I/O  73F w HFrEF (EF 30%), mod AS, known LBBB, HTN, IDDM1, CKD, hypothyroidism, chronic lymphedema, suspected OHS/LELIA on 3L NC home O2 p/w 1 episode of syncope. Recent admission at [facility] (3/29-4/5) for ADHF and DUNCAN s/p diuresis, discharged with new home O2 3L NC suspecting OHS/LELIA pending outpatient w/u. Since discharge a week ago, she has been staying at home with   Pt had sob walking to car. Once in car, she was still breathing heavily. Partner noticed pt was not responding to verbal stimuli for 10-15sec and witnessed R arm jerking. Pt gained consciousness quickly without postictal symptoms. Pt went to Cardiologist Dr. Nathan who recommended pt to come to ED. No fever, cp, abd pain, n/v. Leg swelling is improved from prior. Pt on torsemide 40mg which she has been taking. Pt was discharged on 3LNC which she is currently on. Patient reports she did not take Farxiga that she was discharged on as she was concerned about side effects.     In ED, patient was found afebrile, hemodynamically stable, breathing well on 3L NC. Initial labs notable for mild hyponatremia, DUNCAN on CKD, elevated proBNP and elevated pCO2 both improved from prior.  pt also noticed with abn creatinine. had severe AS had ct scan with IV contrast had contrast nephropathy and hypotension ---> CRRT required       1- CKD IV  with DUNCAN   2- CHF   3- lymphedema   4- severe AS  s/p tavr 4/24  5- hypotension       creatinine is overall steady in this range  now developing LE edema, ACE bandage is on, she may need to resume diuretics soon   orthostatic hypotension,   continue midodrine 15 mg tid,  northera 200 mg TID   florinef 0.1 mg daily  continue to check orthostatic bp  also PT for conditioning   anemia, hb higher dc epogen   non-oliguric, off Hd   hardy --->  for urinary retention  ?TOV  strict I/O

## 2024-05-25 NOTE — PROGRESS NOTE ADULT - PROBLEM SELECTOR PLAN 1
Patient had maroon-colored stool on 5/24, positive for blood.  Hemodynamically stable with stable hemoglobin  - GI outpatient Dr Hansel Luna consulted, appreciate recs  - Pantoprazole 40mg IV BID  - D/c bowel regimen  - Continue Eliquis 5mg BID unless there is a drop in Hb  - Continue diet Patient had maroon-colored stool on 5/24, positive for blood.  Hemodynamically stable with stable hemoglobin  - GI outpatient Dr Hansel Luna consulted, appreciate recs  - Pantoprazole 40mg IV BID, will transition to PO daily pending continued improvement  - D/c bowel regimen  - Continue Eliquis 5mg BID unless there is a drop in Hb  - Continue diet

## 2024-05-25 NOTE — PROGRESS NOTE ADULT - NSPROGADDITIONALINFOA_GEN_ALL_CORE
Prescription for new glasses given. #Adrenal Nodule  Incidental finding on CT C/A/P w/ thickened L adrenal gland and 1.2 x 0.8 cm L adrenal nodule.  Two positive DST, concerning for cushing syndrome. Endo following.  - no indication for txt at this time  - f/u outpatient w/ Dr. Maynor Dumont for repeat testing.    #Need for Prophylactic Measure  DVT ppx: Eliquis  Diet: CC   Dispo: pending clinical course, PT rec HONEY

## 2024-05-25 NOTE — CHART NOTE - NSCHARTNOTEFT_GEN_A_CORE
73F w/h/o of T1DM with unknown control due to CKD and anemia of chronic disease. DM c/b CKD. Also h/o HFrEF (EF 30%), mod AS, known LBBB, HTN,  hypothyroidism, chronic lymphedema, suspected OHS/LELIA on 3L NC home O2 p/w 1 episode of syncope with R arm jerking, likely 2/2 severe symptomatic AS. S/p AVR 4/24 c/b DUNCAN on CKD, fever and hypotension. Also UTI/pul edema/ sepsis and L adrenal nodule finding. Also orthostatic hypotension > now on Florinef.   Chart reviewed and patient was seen at [facility]. Last 24 hour BGs 80s-100s  with tightly controlled BG at HS and in am. As per pt, tolerating POs well, she eats 3/4 of meals. BG goal 100 to 180s. will decrease insulin doses preventively     Home regimen: Lantus 33 units qhs, Novolog based on sliding scale TIDAC normally 3-5 units  Has Dexcom G7    POCT Blood Glucose:  115 mg/dL (05-25-24 @ 17:16)  115 mg/dL (05-25-24 @ 12:17)  173 mg/dL (05-25-24 @ 10:10)  79 mg/dL (05-25-24 @ 08:55)  85 mg/dL (05-25-24 @ 04:04)  101 mg/dL (05-24-24 @ 23:48)  85 mg/dL (05-24-24 @ 22:09)  81 mg/dL (05-24-24 @ 21:23)      eMAR:  atorvastatin   80 milliGRAM(s) Oral (05-24-24 @ 21:41)    fludroCORTISONE   0.1 milliGRAM(s) Oral (05-25-24 @ 05:09)    insulin glargine Injectable (LANTUS)   10 Unit(s) SubCutaneous (05-24-24 @ 23:49)    insulin lispro (ADMELOG) corrective regimen sliding scale   1 Unit(s) SubCutaneous (05-25-24 @ 10:12)    insulin lispro Injectable (ADMELOG)   7 Unit(s) SubCutaneous (05-25-24 @ 18:00)   7 Unit(s) SubCutaneous (05-25-24 @ 12:52)   7 Unit(s) SubCutaneous (05-25-24 @ 10:12)    levothyroxine   75 MICROGram(s) Oral (05-25-24 @ 05:09)    Diet, Consistent Carbohydrate w/Evening Snack (05-14-24 @ 17:35) [Active]      # Type 1 diabetes mellitus with hypoglycemia.    - Test BG ac and hs/2am  - Decrease Lantus 8 units at hs.   - Decrease Admelog dose 7 units TIDAC.  Pt to inform staff if not eating or not eating enough  - C/w Low dose admelog correction scale TIDAC, Low dose admelog correction scale QHS and 2am to monitor hypoglycemia   - please keep hypoglycemia protocol in place       Contact via [employer] Teams during business hours  To reach covering provider access AMION via sun[employer] tools  For Urgent matters/after-hours/weekends/holidays please page endocrine fellow on call   For nonurgent matters please email [email or web address]    Please note that this patient may be followed by different provider tomorrow.  Notify endocrine 24 hours prior to discharge for final recommendations

## 2024-05-25 NOTE — PROGRESS NOTE ADULT - PROBLEM SELECTOR PLAN 9
Likely delayed contrast reaction vs pantoprazole. Derm biopsy consistent w/ agep. s/p nystatin and triamcinolone cream to affected debi. Improving. Now with new rash    - continue to monitor  - calamine cream, vaseline  - Restart triamcinolone  - Derm re-consulted, appreciate recs

## 2024-05-25 NOTE — PROGRESS NOTE ADULT - ATTENDING COMMENTS
73F PMH: HFrEF (EF 30%), AS, LBBB, HTN, DM1, CKD, hypothyroidism, chronic lymphedema, LELIA (3L home O2) p/w for syncope 2/2 severe AS, s/p TAVR 4/24. Course complicated by contrast induced allergic reaction (had CTA for TAVR eval) and contrast related renal failure (acute on chronic) requiring HD. TAVR c/b VT and vfib requiring shock and flash pulmonary edema causing Acute hypoxic respiratory failure  requiring intubation.     Course complicated by contrast induced allergic reaction (had CTA for TAVR eval) and contrast related renal failure (acute on chronic) requiring CRRT for fluid removal and pressors for vasoplegia and hypovolemia due to CRRT. Patient extubated, now off pressors, CRRT.       1.Melena- patient states does not recall episode from yesterday. Continue to monitor CBC--> GI recommend monitoring and to hold AC if dropping.  Discontinued bowel regimen   2. Orthostatic hypotension- droxydopa, midodrine with florinef added yesterday. patient's orthostatics still positive. Dose of Florinef increased today(5/25)   3. Left UE DVT- c/w eliquis. Appreciate derm for rash--> per derm is resolving erythrodermic drug eruption  4.T1DM- uncontrolled with intermittent episodes of labile BS- . Being manged by endocrinology and currently on Lantus and Admelog  5. DUNCAN on CKD stage 4- cr stable  6. HFrEF- currently GDMT is on hold secondary to OH.    Plan for Trial of Void today.   PT consult.   Continue to monitor orthostatics daily.

## 2024-05-25 NOTE — PROGRESS NOTE ADULT - SUBJECTIVE AND OBJECTIVE BOX
***************************************************************  Jairo Kaufman, PG1  Internal Medicine   Pager:  TEAMS  ***************************************************************    EVELYN LOPEZ  73y  MRN: [id number]    Patient is a 73y old  Female who presents with a chief complaint of Syncope (24 May 2024 16:09)      Interval/Overnight Events: no events ON.     Subjective: Pt seen and examined at bedside. Denies fever, CP, SOB, abn pain, N/V, dysuria. Tolerating diet.      MEDICATIONS  (STANDING):  ammonium lactate 12% Lotion 1 Application(s) Topical <User Schedule>  apixaban 5 milliGRAM(s) Oral two times a day  aspirin  chewable 81 milliGRAM(s) Oral daily  atorvastatin 80 milliGRAM(s) Oral at bedtime  calamine/zinc oxide Lotion 1 Application(s) Topical three times a day  dextrose 10% Bolus 125 milliLiter(s) IV Bolus once  dextrose 5%. 1000 milliLiter(s) (50 mL/Hr) IV Continuous <Continuous>  dextrose 5%. 1000 milliLiter(s) (100 mL/Hr) IV Continuous <Continuous>  dextrose 50% Injectable 25 Gram(s) IV Push once  dextrose 50% Injectable 12.5 Gram(s) IV Push once  droxidopa 200 milliGRAM(s) Oral three times a day  ferrous    sulfate 325 milliGRAM(s) Oral two times a day  fludroCORTISONE 0.1 milliGRAM(s) Oral daily  glucagon  Injectable 1 milliGRAM(s) IntraMuscular once  insulin glargine Injectable (LANTUS) 22 Unit(s) SubCutaneous at bedtime  insulin lispro (ADMELOG) corrective regimen sliding scale   SubCutaneous <User Schedule>  insulin lispro (ADMELOG) corrective regimen sliding scale   SubCutaneous three times a day before meals  insulin lispro Injectable (ADMELOG) 10 Unit(s) SubCutaneous three times a day before meals  levothyroxine 75 MICROGram(s) Oral daily  midodrine. 15 milliGRAM(s) Oral three times a day  Nephro-molly 1 Tablet(s) Oral daily  pantoprazole  Injectable 40 milliGRAM(s) IV Push two times a day  triamcinolone 0.1% Ointment 1 Application(s) Topical two times a day    MEDICATIONS  (PRN):  dextrose Oral Gel 15 Gram(s) Oral once PRN Blood Glucose LESS THAN 70 milliGRAM(s)/deciliter      Objective:    Vitals: Vital Signs Last 24 Hrs  T(C): 36.8 (05-24-24 @ 20:09), Max: 36.8 (05-24-24 @ 20:09)  T(F): 98.2 (05-24-24 @ 20:09), Max: 98.2 (05-24-24 @ 20:09)  HR: 72 (05-24-24 @ 20:09) (72 - 92)  BP: 143/76 (05-24-24 @ 20:09) (107/69 - 143/76)  BP(mean): --  RR: 18 (05-24-24 @ 20:09) (18 - 18)  SpO2: 98% (05-24-24 @ 20:09) (97% - 98%)                I&O's Summary    23 May 2024 07:01  -  24 May 2024 07:00  --------------------------------------------------------  IN: 0 mL / OUT: 900 mL / NET: -900 mL    24 May 2024 07:01  -  25 May 2024 06:24  --------------------------------------------------------  IN: 360 mL / OUT: 1225 mL / NET: -865 mL        PHYSICAL EXAM:  GENERAL: NAD  HEAD:  Atraumatic, Normocephalic  EYES: EOMI, PERRL, conjunctiva and sclera clear  ENMT: Moist mucous membranes  NECK: Supple  CHEST/LUNG: Clear to auscultation bilaterally; No rales, rhonchi, wheezing, or rubs  HEART: Regular rate and rhythm, 4/6 systolic murmur  ABDOMEN: Soft, Nontender, Nondistended; Bowel sounds present  EXTREMITIES:  2+ Peripheral Pulses, 2+ b/l edema up to thighs wrapped  SKIN: generalized rash resolved, patient now with pruritic erythemic macular rash on LUE resolving  NERVOUS SYSTEM:  Alert, no focal deficits  PSYCH:  Appropriate affect    LABS:                        11.7   9.17  )-----------( 181      ( 24 May 2024 20:12 )             40.4                         10.7   9.64  )-----------( 188      ( 24 May 2024 10:46 )             35.4                         10.1   8.02  )-----------( 205      ( 23 May 2024 06:48 )             33.9     05-24    137  |  101  |  39<H>  ----------------------------<  187<H>  4.0   |  25  |  2.03<H>  05-23    136  |  101  |  38<H>  ----------------------------<  159<H>  4.0   |  24  |  2.21<H>  05-22    136  |  98  |  40<H>  ----------------------------<  207<H>  4.4   |  24  |  2.25<H>    Ca    8.8      24 May 2024 10:46  Ca    8.4      23 May 2024 06:48  Ca    8.5      22 May 2024 07:04  Phos  4.5     05-24  Mg     2.0     05-24    TPro  6.1  /  Alb  2.8<L>  /  TBili  0.5  /  DBili  x   /  AST  24  /  ALT  6<L>  /  AlkPhos  85  05-24  TPro  6.0  /  Alb  2.9<L>  /  TBili  0.4  /  DBili  x   /  AST  20  /  ALT  10  /  AlkPhos  83  05-23  TPro  6.0  /  Alb  2.9<L>  /  TBili  0.4  /  DBili  x   /  AST  23  /  ALT  11  /  AlkPhos  84  05-22    CAPILLARY BLOOD GLUCOSE      POCT Blood Glucose.: 85 mg/dL (25 May 2024 04:04)  POCT Blood Glucose.: 101 mg/dL (24 May 2024 23:48)  POCT Blood Glucose.: 85 mg/dL (24 May 2024 22:09)  POCT Blood Glucose.: 81 mg/dL (24 May 2024 21:23)  POCT Blood Glucose.: 150 mg/dL (24 May 2024 17:42)  POCT Blood Glucose.: 231 mg/dL (24 May 2024 13:01)  POCT Blood Glucose.: 173 mg/dL (24 May 2024 09:10)        Urinalysis Basic - ( 24 May 2024 10:46 )    Color: x / Appearance: x / SG: x / pH: x  Gluc: 187 mg/dL / Ketone: x  / Bili: x / Urobili: x   Blood: x / Protein: x / Nitrite: x   Leuk Esterase: x / RBC: x / WBC x   Sq Epi: x / Non Sq Epi: x / Bacteria: x          RADIOLOGY & ADDITIONAL TESTS:    Imaging Personally Reviewed:  [x ] YES  [ ] NO    Consultants involved in case:   Consultant(s) Notes Reviewed:  [ x] YES  [ ] NO:   Care Discussed with Consultants/Other Providers [x ] YES  [ ] NO         ***************************************************************  Jairo Kaufman, PG1  Internal Medicine   Pager:  TEAMS  ***************************************************************    EVELYN LOPEZ  73y  MRN: [id number]    Patient is a 73y old  Female who presents with a chief complaint of Syncope (24 May 2024 16:09)      Interval/Overnight Events: Maroon BM yesterday.  GI Consulted.  Hb Stable    Subjective: Pt seen and examined at bedside. Feels well.  Having some diarrhea.      MEDICATIONS  (STANDING):  ammonium lactate 12% Lotion 1 Application(s) Topical <User Schedule>  apixaban 5 milliGRAM(s) Oral two times a day  aspirin  chewable 81 milliGRAM(s) Oral daily  atorvastatin 80 milliGRAM(s) Oral at bedtime  calamine/zinc oxide Lotion 1 Application(s) Topical three times a day  dextrose 10% Bolus 125 milliLiter(s) IV Bolus once  dextrose 5%. 1000 milliLiter(s) (50 mL/Hr) IV Continuous <Continuous>  dextrose 5%. 1000 milliLiter(s) (100 mL/Hr) IV Continuous <Continuous>  dextrose 50% Injectable 25 Gram(s) IV Push once  dextrose 50% Injectable 12.5 Gram(s) IV Push once  droxidopa 200 milliGRAM(s) Oral three times a day  ferrous    sulfate 325 milliGRAM(s) Oral two times a day  fludroCORTISONE 0.1 milliGRAM(s) Oral daily  glucagon  Injectable 1 milliGRAM(s) IntraMuscular once  insulin glargine Injectable (LANTUS) 22 Unit(s) SubCutaneous at bedtime  insulin lispro (ADMELOG) corrective regimen sliding scale   SubCutaneous <User Schedule>  insulin lispro (ADMELOG) corrective regimen sliding scale   SubCutaneous three times a day before meals  insulin lispro Injectable (ADMELOG) 10 Unit(s) SubCutaneous three times a day before meals  levothyroxine 75 MICROGram(s) Oral daily  midodrine. 15 milliGRAM(s) Oral three times a day  Nephro-molly 1 Tablet(s) Oral daily  pantoprazole  Injectable 40 milliGRAM(s) IV Push two times a day  triamcinolone 0.1% Ointment 1 Application(s) Topical two times a day    MEDICATIONS  (PRN):  dextrose Oral Gel 15 Gram(s) Oral once PRN Blood Glucose LESS THAN 70 milliGRAM(s)/deciliter      Objective:    Vitals: Vital Signs Last 24 Hrs  T(C): 36.8 (05-24-24 @ 20:09), Max: 36.8 (05-24-24 @ 20:09)  T(F): 98.2 (05-24-24 @ 20:09), Max: 98.2 (05-24-24 @ 20:09)  HR: 72 (05-24-24 @ 20:09) (72 - 92)  BP: 143/76 (05-24-24 @ 20:09) (107/69 - 143/76)  BP(mean): --  RR: 18 (05-24-24 @ 20:09) (18 - 18)  SpO2: 98% (05-24-24 @ 20:09) (97% - 98%)                I&O's Summary    23 May 2024 07:01  -  24 May 2024 07:00  --------------------------------------------------------  IN: 0 mL / OUT: 900 mL / NET: -900 mL    24 May 2024 07:01  -  25 May 2024 06:24  --------------------------------------------------------  IN: 360 mL / OUT: 1225 mL / NET: -865 mL        PHYSICAL EXAM:  GENERAL: NAD  HEAD:  Atraumatic, Normocephalic  EYES: EOMI, PERRL, conjunctiva and sclera clear  ENMT: Moist mucous membranes  NECK: Supple  CHEST/LUNG: Clear to auscultation bilaterally; No rales, rhonchi, wheezing, or rubs  HEART: Regular rate and rhythm, 4/6 systolic murmur  ABDOMEN: Soft, Nontender, Nondistended; Bowel sounds present  EXTREMITIES:  2+ Peripheral Pulses, 2+ b/l edema up to thighs wrapped  SKIN: generalized rash resolved, patient now with pruritic erythemic macular rash on LUE resolving, possible rash on RUE developing  NERVOUS SYSTEM:  Alert, no focal deficits  PSYCH:  Appropriate affect    LABS:                        11.7   9.17  )-----------( 181      ( 24 May 2024 20:12 )             40.4                         10.7   9.64  )-----------( 188      ( 24 May 2024 10:46 )             35.4                         10.1   8.02  )-----------( 205      ( 23 May 2024 06:48 )             33.9     05-24    137  |  101  |  39<H>  ----------------------------<  187<H>  4.0   |  25  |  2.03<H>  05-23    136  |  101  |  38<H>  ----------------------------<  159<H>  4.0   |  24  |  2.21<H>  05-22    136  |  98  |  40<H>  ----------------------------<  207<H>  4.4   |  24  |  2.25<H>    Ca    8.8      24 May 2024 10:46  Ca    8.4      23 May 2024 06:48  Ca    8.5      22 May 2024 07:04  Phos  4.5     05-24  Mg     2.0     05-24    TPro  6.1  /  Alb  2.8<L>  /  TBili  0.5  /  DBili  x   /  AST  24  /  ALT  6<L>  /  AlkPhos  85  05-24  TPro  6.0  /  Alb  2.9<L>  /  TBili  0.4  /  DBili  x   /  AST  20  /  ALT  10  /  AlkPhos  83  05-23  TPro  6.0  /  Alb  2.9<L>  /  TBili  0.4  /  DBili  x   /  AST  23  /  ALT  11  /  AlkPhos  84  05-22    CAPILLARY BLOOD GLUCOSE      POCT Blood Glucose.: 85 mg/dL (25 May 2024 04:04)  POCT Blood Glucose.: 101 mg/dL (24 May 2024 23:48)  POCT Blood Glucose.: 85 mg/dL (24 May 2024 22:09)  POCT Blood Glucose.: 81 mg/dL (24 May 2024 21:23)  POCT Blood Glucose.: 150 mg/dL (24 May 2024 17:42)  POCT Blood Glucose.: 231 mg/dL (24 May 2024 13:01)  POCT Blood Glucose.: 173 mg/dL (24 May 2024 09:10)        Urinalysis Basic - ( 24 May 2024 10:46 )    Color: x / Appearance: x / SG: x / pH: x  Gluc: 187 mg/dL / Ketone: x  / Bili: x / Urobili: x   Blood: x / Protein: x / Nitrite: x   Leuk Esterase: x / RBC: x / WBC x   Sq Epi: x / Non Sq Epi: x / Bacteria: x          RADIOLOGY & ADDITIONAL TESTS:    Imaging Personally Reviewed:  [x ] YES  [ ] NO    Consultants involved in case:   Consultant(s) Notes Reviewed:  [ x] YES  [ ] NO:   Care Discussed with Consultants/Other Providers [x ] YES  [ ] NO

## 2024-05-25 NOTE — PROGRESS NOTE ADULT - PROBLEM SELECTOR PLAN 3
Patient has waxing and waning swelling of left upper extremity during hospitalization, per Catrina.  Noted on exam on 5/16.  DVT Duplex ultrasound Bilateral upper extremities and found to have left axial, brachial, and subclavian DVT on imaging 5/16.  Now with pruritic rash    - Begun heparin gtt (5/16), discontinued 5/17  - Eliquis load 10mg BID x 7 days completed (5/17 - 5/23).  - Initiate Eliquis 5mg BID x 90 days for continued AC (5/24 - )  - Dermatology consult for rash rec topical steroid cream appears to be improving Patient has waxing and waning swelling of left upper extremity during hospitalization, per Catrina.  Noted on exam on 5/16.  DVT Duplex ultrasound Bilateral upper extremities and found to have left axial, brachial, and subclavian DVT on imaging 5/16.  Now with pruritic rash    - Begun heparin gtt (5/16), discontinued 5/17  - Eliquis load 10mg BID x 7 days completed (5/17 - 5/23).  - Continue Eliquis 5mg BID x 90 days for continued AC (5/24 - )  - Dermatology consult for rash rec topical steroid cream appears to be improving

## 2024-05-25 NOTE — PROGRESS NOTE ADULT - SUBJECTIVE AND OBJECTIVE BOX
[facility]   [phone]  RENAL FOLLOW UP NOTE  --------------------------------------------------------------------------------  Chief Complaint:    24 hour events/subjective:    seen earlier   denies sob   states was able to sit in chair yesterday for few hours with out feeling dizziness     PAST HISTORY  --------------------------------------------------------------------------------  No significant changes to PMH, PSH, FHx, SHx, unless otherwise noted    ALLERGIES & MEDICATIONS  --------------------------------------------------------------------------------  Allergies    contrast media (iodine-based) (Fever; Vomiting; Flushing; Hypotension; Rash; Stomach Upset)  Ativan (Rash; Urticaria)  penicillins (Hives (Mod to Severe); Anaphylaxis (Mod to Severe); Short breath (Mod to Severe); Angioedema (Mod to Severe))    Intolerances      Standing Inpatient Medications  ammonium lactate 12% Lotion 1 Application(s) Topical <User Schedule>  apixaban 5 milliGRAM(s) Oral two times a day  aspirin  chewable 81 milliGRAM(s) Oral daily  atorvastatin 80 milliGRAM(s) Oral at bedtime  calamine/zinc oxide Lotion 1 Application(s) Topical three times a day  dextrose 10% Bolus 125 milliLiter(s) IV Bolus once  dextrose 5%. 1000 milliLiter(s) IV Continuous <Continuous>  dextrose 5%. 1000 milliLiter(s) IV Continuous <Continuous>  dextrose 50% Injectable 12.5 Gram(s) IV Push once  dextrose 50% Injectable 25 Gram(s) IV Push once  droxidopa 200 milliGRAM(s) Oral three times a day  ferrous    sulfate 325 milliGRAM(s) Oral two times a day  fludroCORTISONE 0.2 milliGRAM(s) Oral daily  glucagon  Injectable 1 milliGRAM(s) IntraMuscular once  insulin glargine Injectable (LANTUS) 8 Unit(s) SubCutaneous at bedtime  insulin lispro (ADMELOG) corrective regimen sliding scale   SubCutaneous <User Schedule>  insulin lispro (ADMELOG) corrective regimen sliding scale   SubCutaneous three times a day before meals  insulin lispro Injectable (ADMELOG) 7 Unit(s) SubCutaneous three times a day before meals  levothyroxine 75 MICROGram(s) Oral daily  midodrine. 15 milliGRAM(s) Oral three times a day  Nephro-molly 1 Tablet(s) Oral daily  pantoprazole  Injectable 40 milliGRAM(s) IV Push two times a day  triamcinolone 0.1% Ointment 1 Application(s) Topical two times a day    PRN Inpatient Medications  dextrose Oral Gel 15 Gram(s) Oral once PRN      REVIEW OF SYSTEMS  --------------------------------------------------------------------------------    Gen: denies  fevers/chills,  CVS: denies chest pain/palpitations  Resp: denies SOB/Cough  GI: Denies N/V/Abd pain  : Denies dysuria    VITALS/PHYSICAL EXAM  --------------------------------------------------------------------------------  T(C): 36.9 (05-25-24 @ 08:19), Max: 36.9 (05-25-24 @ 08:19)  HR: 82 (05-25-24 @ 08:19) (82 - 84)  BP: 139/70 (05-25-24 @ 08:19) (113/63 - 139/70)  RR: 18 (05-25-24 @ 08:19) (18 - 18)  SpO2: 95% (05-25-24 @ 08:19) (95% - 95%)  Wt(kg): --        05-24-24 @ 07:01  -  05-25-24 @ 07:00  --------------------------------------------------------  IN: 360 mL / OUT: 1225 mL / NET: -865 mL    05-25-24 @ 07:01  -  05-25-24 @ 20:32  --------------------------------------------------------  IN: 500 mL / OUT: 100 mL / NET: 400 mL      Physical Exam:  	    	              Gen: comfortable appearing   	Pulm: decrease bs  no rales or ronchi or wheezing  	CV: No JVD. RRR, S1S2; no rub II/VI M   	Abd: +BS, soft, nontender/nondistended, obese   	UE: Warm, no cyanosis  no clubbing, no edema  	LE: Warm, no cyanosis  no clubbing, 2 -  edema,  	Neuro: alert and oriented     LABS/STUDIES  --------------------------------------------------------------------------------              11.0   10.34 >-----------<  172      [05-25-24 @ 07:16]              37.2     137  |  102  |  37  ----------------------------<  75      [05-25-24 @ 07:16]  4.2   |  23  |  2.00        Ca     8.7     [05-25-24 @ 07:16]      Mg     2.0     [05-25-24 @ 07:16]      Phos  3.8     [05-25-24 @ 07:16]    TPro  6.0  /  Alb  2.6  /  TBili  0.5  /  DBili  x   /  AST  20  /  ALT  9   /  AlkPhos  83  [05-25-24 @ 07:16]          Creatinine Trend:  SCr 2.00 [05-25 @ 07:16]  SCr 2.03 [05-24 @ 10:46]  SCr 2.21 [05-23 @ 06:48]  SCr 2.25 [05-22 @ 07:04]  SCr 2.02 [05-21 @ 07:20]      PTH -- (Ca 8.4)      [04-19-24 @ 17:54]   109  TSH 5.06      [04-14-24 @ 07:18]  Lipid: chol 74, TG 70, HDL 33, LDL --      [04-13-24 @ 07:05]

## 2024-05-25 NOTE — PROGRESS NOTE ADULT - PROBLEM SELECTOR PLAN 2
Likely in setting of deconditioning.  Improving with fluids.  Will attempt orthostatics daily.  Hoping to wean off midodrine in order to restart GDMT.  Orthostatics improving from supine to seated, but still problematic with standing.  Remains orthostatic and symptomatic on standing    - leg compression  - Difficulty with abdominal binder due to body habitus   - PT rec HONEY   - OOB as tolerated   - midodrine 15mg TID.  - droxydopa 200mg TID  - fludrocortisone 0.1mg daily  - Continue to evaluate standing orthostatics Likely in setting of deconditioning.  Improving with fluids.  Will attempt orthostatics daily.  Hoping to wean off midodrine in order to restart GDMT.  Orthostatics improving from supine to seated, but still problematic with standing.  Remains orthostatic and symptomatic on standing    - leg compression  - Difficulty with abdominal binder due to body habitus   - PT rec HONEY   - OOB as tolerated   - midodrine 15mg TID.  - droxydopa 200mg TID  - fludrocortisone 0.1mg daily, will uptitrate next if needed  - Continue to evaluate standing orthostatics Likely in setting of deconditioning.  Improving with fluids.  Will attempt orthostatics daily.  Hoping to wean off midodrine in order to restart GDMT.  Orthostatics improving from supine to seated, but still problematic with standing.  Remains orthostatic and symptomatic on standing    - leg compression  - Difficulty with abdominal binder due to body habitus   - PT rec HONEY   - OOB as tolerated   - midodrine 15mg TID.  - droxydopa 200mg TID  - Increase to fludrocortisone 0.2mg daily  - Continue to evaluate standing orthostatics

## 2024-05-25 NOTE — PROGRESS NOTE ADULT - SUBJECTIVE AND OBJECTIVE BOX
[facility]  Franklin Smith PA-C  [address] [place] [address]  [phone]      INTERVAL HPI/OVERNIGHT EVENTS:  Seen and examined  offers no complaints  Pos bloody stool yesterday per nursing staff, h/h stable      MEDICATIONS  (STANDING):  ammonium lactate 12% Lotion 1 Application(s) Topical <User Schedule>  apixaban 5 milliGRAM(s) Oral two times a day  aspirin  chewable 81 milliGRAM(s) Oral daily  atorvastatin 80 milliGRAM(s) Oral at bedtime  calamine/zinc oxide Lotion 1 Application(s) Topical three times a day  dextrose 10% Bolus 125 milliLiter(s) IV Bolus once  dextrose 5%. 1000 milliLiter(s) (100 mL/Hr) IV Continuous <Continuous>  dextrose 5%. 1000 milliLiter(s) (50 mL/Hr) IV Continuous <Continuous>  dextrose 50% Injectable 12.5 Gram(s) IV Push once  dextrose 50% Injectable 25 Gram(s) IV Push once  droxidopa 200 milliGRAM(s) Oral three times a day  ferrous    sulfate 325 milliGRAM(s) Oral two times a day  fludroCORTISONE 0.1 milliGRAM(s) Oral daily  glucagon  Injectable 1 milliGRAM(s) IntraMuscular once  insulin glargine Injectable (LANTUS) 22 Unit(s) SubCutaneous at bedtime  insulin lispro (ADMELOG) corrective regimen sliding scale   SubCutaneous three times a day before meals  insulin lispro (ADMELOG) corrective regimen sliding scale   SubCutaneous <User Schedule>  insulin lispro Injectable (ADMELOG) 7 Unit(s) SubCutaneous three times a day before meals  levothyroxine 75 MICROGram(s) Oral daily  midodrine. 15 milliGRAM(s) Oral three times a day  Nephro-molly 1 Tablet(s) Oral daily  pantoprazole  Injectable 40 milliGRAM(s) IV Push two times a day  triamcinolone 0.1% Ointment 1 Application(s) Topical two times a day    MEDICATIONS  (PRN):  dextrose Oral Gel 15 Gram(s) Oral once PRN Blood Glucose LESS THAN 70 milliGRAM(s)/deciliter      Allergies    contrast media (iodine-based) (Fever; Vomiting; Flushing; Hypotension; Rash; Stomach Upset)  Ativan (Rash; Urticaria)  penicillins (Hives (Mod to Severe); Anaphylaxis (Mod to Severe); Short breath (Mod to Severe); Angioedema (Mod to Severe))    Intolerances      ROS:     General:  No wt loss, fevers, chills, night sweats, fatigue,   Eyes:  Good vision, no reported pain  ENT:  No sore throat, pain, runny nose, dysphagia  CV:  No pain, palpitations, hypo/hypertension  Resp:  No dyspnea, cough, tachypnea, wheezing  GI:  No pain, No nausea, No vomiting, No diarrhea, No constipation, No weight loss, No fever, No pruritis, No rectal bleeding, No tarry stools, No dysphagia,  :  No pain, bleeding, incontinence, nocturia  Muscle:  No pain, weakness  Neuro:  No weakness, tingling, memory problems  Psych:  No fatigue, insomnia, mood problems, depression  Endocrine:  No polyuria, polydipsia, cold/heat intolerance  Heme:  No petechiae, ecchymosis, easy bruisability  Skin:  No rash, tattoos, scars, edema        PHYSICAL EXAM:   Vital Signs:  Vital Signs Last 24 Hrs  T(C): 36.9 (25 May 2024 08:19), Max: 36.9 (25 May 2024 08:19)  T(F): 98.5 (25 May 2024 08:19), Max: 98.5 (25 May 2024 08:19)  HR: 82 (25 May 2024 08:19) (72 - 92)  BP: 139/70 (25 May 2024 08:19) (107/69 - 143/76)  BP(mean): --  RR: 18 (25 May 2024 08:19) (18 - 18)  SpO2: 95% (25 May 2024 08:19) (95% - 98%)    Parameters below as of 25 May 2024 08:19  Patient On (Oxygen Delivery Method): room air      Daily     Daily         GENERAL:  Appears stated age  HEENT:  NC/AT  CHEST:  Full & symmetric excursion  HEART:  Regular rhythm  ABDOMEN:  Soft, non tender non distended   EXTEREMITIES:  no cyanosis  SKIN:  No rash  NEURO:  Alert          LABS:                        11.0   10.34 )-----------( 172      ( 25 May 2024 07:16 )             37.2     05-25    137  |  102  |  37<H>  ----------------------------<  75  4.2   |  23  |  2.00<H>    Ca    8.7      25 May 2024 07:16  Phos  3.8     05-25  Mg     2.0     05-25    TPro  6.0  /  Alb  2.6<L>  /  TBili  0.5  /  DBili  x   /  AST  20  /  ALT  9<L>  /  AlkPhos  83  05-25      Urinalysis Basic - ( 25 May 2024 07:16 )    Color: x / Appearance: x / SG: x / pH: x  Gluc: 75 mg/dL / Ketone: x  / Bili: x / Urobili: x   Blood: x / Protein: x / Nitrite: x   Leuk Esterase: x / RBC: x / WBC x   Sq Epi: x / Non Sq Epi: x / Bacteria: x        RADIOLOGY & ADDITIONAL TESTS:

## 2024-05-25 NOTE — PROGRESS NOTE ADULT - PROBLEM SELECTOR PLAN 4
Home regimen: lantus 33u qhs w/ premeal sliding scale. A1c 7.4% in 3/2024. multiple hypoglycemic episodes, DKA now resolved, anion gap closed.  Close discussions with endocrine ongoing regarding management.  Hypoglycemic in AM several times now since discharge from MICU.  Appears to have more controlled blood sugars    - endo following, appreciate recs   - Basal insulin per endocrine recs  - Dosing premeal insulin per endocrine recs  - LDSSI and LDSSI at night  - premeal and bedtime fingersticks  - hypoglycemia protocol.

## 2024-05-25 NOTE — PROGRESS NOTE ADULT - PROBLEM/PLAN-7
DISPLAY PLAN FREE TEXT
DISPLAY PLAN FREE TEXT
No.
DISPLAY PLAN FREE TEXT

## 2024-05-25 NOTE — PROGRESS NOTE ADULT - ASSESSMENT
Ms Alina Chowdhury is a 72 y/o F with PMHx HFrEF (EF 30%), AS, LBBB, HTN, DM1, CKD, hypothyroidism, chronic lymphedema, LELIA (3L home O2) who presented for syncope 2/2 severe AS, now s/p TAVR 4/24 c/b by VT and Vfib requiring shock and flash pulmonary edema requiring intubation. Course complicated by contrast induced allergic reaction (had CTA for TAVR eval) and contrast related renal failure (acute on chronic) requiring CRRT for fluid removal and pressors for vasoplegia and hypovolemia due to CRRT. Patient now extubated, off pressors, CRRT and downgraded to medicine floors now c/b severe orthostatic hypotension, now with GI bleed

## 2024-05-26 NOTE — PROGRESS NOTE ADULT - PROBLEM SELECTOR PLAN 12
#Need for Prophylactic Measure  DVT ppx: Eliquis  Diet: CC   Dispo: pending clinical course, PT rec HONEY

## 2024-05-26 NOTE — PROGRESS NOTE ADULT - SUBJECTIVE AND OBJECTIVE BOX
[facility]  Franklin Smith PA-C  [address] [place] [address]  [phone]      INTERVAL HPI/OVERNIGHT EVENTS:  Seen and examined  offers no complaints  no further episodes bleeding         MEDICATIONS  (STANDING):  ammonium lactate 12% Lotion 1 Application(s) Topical <User Schedule>  apixaban 5 milliGRAM(s) Oral two times a day  aspirin  chewable 81 milliGRAM(s) Oral daily  atorvastatin 80 milliGRAM(s) Oral at bedtime  calamine/zinc oxide Lotion 1 Application(s) Topical three times a day  dextrose 10% Bolus 125 milliLiter(s) IV Bolus once  dextrose 5%. 1000 milliLiter(s) (100 mL/Hr) IV Continuous <Continuous>  dextrose 5%. 1000 milliLiter(s) (50 mL/Hr) IV Continuous <Continuous>  dextrose 50% Injectable 12.5 Gram(s) IV Push once  dextrose 50% Injectable 25 Gram(s) IV Push once  droxidopa 200 milliGRAM(s) Oral three times a day  ferrous    sulfate 325 milliGRAM(s) Oral two times a day  fludroCORTISONE 0.2 milliGRAM(s) Oral daily  glucagon  Injectable 1 milliGRAM(s) IntraMuscular once  insulin glargine Injectable (LANTUS) 8 Unit(s) SubCutaneous at bedtime  insulin lispro (ADMELOG) corrective regimen sliding scale   SubCutaneous three times a day before meals  insulin lispro (ADMELOG) corrective regimen sliding scale   SubCutaneous <User Schedule>  insulin lispro Injectable (ADMELOG) 5 Unit(s) SubCutaneous three times a day before meals  levothyroxine 75 MICROGram(s) Oral daily  midodrine. 15 milliGRAM(s) Oral three times a day  Nephro-molly 1 Tablet(s) Oral daily  pantoprazole  Injectable 40 milliGRAM(s) IV Push two times a day  triamcinolone 0.1% Ointment 1 Application(s) Topical two times a day    MEDICATIONS  (PRN):  dextrose Oral Gel 15 Gram(s) Oral once PRN Blood Glucose LESS THAN 70 milliGRAM(s)/deciliter      Allergies    contrast media (iodine-based) (Fever; Vomiting; Flushing; Hypotension; Rash; Stomach Upset)  Ativan (Rash; Urticaria)  penicillins (Hives (Mod to Severe); Anaphylaxis (Mod to Severe); Short breath (Mod to Severe); Angioedema (Mod to Severe))    Intolerances                ROS:     General:  No wt loss, fevers, chills, night sweats, fatigue,   Eyes:  Good vision, no reported pain  ENT:  No sore throat, pain, runny nose, dysphagia  CV:  No pain, palpitations, hypo/hypertension  Resp:  No dyspnea, cough, tachypnea, wheezing  GI:  No pain, No nausea, No vomiting, No diarrhea, No constipation, No weight loss, No fever, No pruritis, No rectal bleeding, No tarry stools, No dysphagia,  :  No pain, bleeding, incontinence, nocturia  Muscle:  No pain, weakness  Neuro:  No weakness, tingling, memory problems  Psych:  No fatigue, insomnia, mood problems, depression  Endocrine:  No polyuria, polydipsia, cold/heat intolerance  Heme:  No petechiae, ecchymosis, easy bruisability  Skin:  No rash, tattoos, scars, edema      PHYSICAL EXAM  Vital Signs Last 24 Hrs  T(C): 36.9 (26 May 2024 04:31), Max: 36.9 (26 May 2024 04:31)  T(F): 98.5 (26 May 2024 04:31), Max: 98.5 (26 May 2024 04:31)  HR: 87 (26 May 2024 04:31) (87 - 93)  BP: 100/64 (26 May 2024 04:31) (100/64 - 102/61)  BP(mean): --  RR: 18 (26 May 2024 04:31) (18 - 18)  SpO2: 94% (26 May 2024 04:31) (94% - 96%)    Parameters below as of 26 May 2024 04:31  Patient On (Oxygen Delivery Method): room air          GENERAL:  Appears stated age  HEENT:  NC/AT  CHEST:  Full & symmetric excursion  HEART:  Regular rhythm  ABDOMEN:  Soft, non-tender, non-distended  EXTEREMITIES:  no cyanosis  SKIN:  No rash  NEURO:  Alert            LABS:                        10.7   9.58  )-----------( 178      ( 26 May 2024 07:06 )             35.9     05-26    133<L>  |  100  |  35<H>  ----------------------------<  199<H>  4.1   |  22  |  2.20<H>    Ca    8.2<L>      26 May 2024 07:06  Phos  3.6     05-26  Mg     1.9     05-26    TPro  5.6<L>  /  Alb  2.6<L>  /  TBili  0.5  /  DBili  x   /  AST  20  /  ALT  11  /  AlkPhos  79  05-26 05-25-24 @ 07:01  -  05-26-24 @ 07:00  --------------------------------------------------------  IN: 740 mL / OUT: 900 mL / NET: -160 mL            RADIOLOGY & ADDITIONAL TESTS:

## 2024-05-26 NOTE — PROGRESS NOTE ADULT - PROBLEM SELECTOR PLAN 1
Patient had maroon-colored stool on 5/24, positive for blood.  Hemodynamically stable with stable hemoglobin  - GI outpatient Dr Hansel Luna consulted, appreciate recs  - Pantoprazole 40mg IV BID, will transition to PO daily pending continued improvement  - D/c bowel regimen  - Continue Eliquis 5mg BID unless there is a drop in Hb  - Continue diet Patient had maroon-colored stool on 5/24, positive for blood.  Hemodynamically stable with stable hemoglobin.  RESOLVED  - GI outpatient Dr Hansel Luna consulted, appreciate recs  - Pantoprazole 40mg IV BID, will transition to PO daily pending continued improvement  - D/c bowel regimen  - Continue Eliquis 5mg BID unless there is a drop in Hb  - Continue diet

## 2024-05-26 NOTE — PROGRESS NOTE ADULT - PROBLEM SELECTOR PLAN 2
Likely in setting of deconditioning.  Improving with fluids.  Will attempt orthostatics daily.  Hoping to wean off midodrine in order to restart GDMT.  Orthostatics improving from supine to seated, but still problematic with standing.  Remains orthostatic and symptomatic on standing    - leg compression  - Difficulty with abdominal binder due to body habitus   - PT rec HONEY   - OOB as tolerated   - midodrine 15mg TID.  - droxydopa 200mg TID  - Increase to fludrocortisone 0.2mg daily  - Continue to evaluate standing orthostatics

## 2024-05-26 NOTE — PROGRESS NOTE ADULT - SUBJECTIVE AND OBJECTIVE BOX
***************************************************************  Jairo Kaufman, PG1  Internal Medicine   Pager:  TEAMS  ***************************************************************    EVELYN LOPEZ  73y  MRN: [id number]    Patient is a 73y old  Female who presents with a chief complaint of Syncope (25 May 2024 20:32)      Interval/Overnight Events: no events ON.     Subjective: Pt seen and examined at bedside. Denies fever, CP, SOB, abn pain, N/V, dysuria. Tolerating diet.      MEDICATIONS  (STANDING):  ammonium lactate 12% Lotion 1 Application(s) Topical <User Schedule>  apixaban 5 milliGRAM(s) Oral two times a day  aspirin  chewable 81 milliGRAM(s) Oral daily  atorvastatin 80 milliGRAM(s) Oral at bedtime  calamine/zinc oxide Lotion 1 Application(s) Topical three times a day  dextrose 10% Bolus 125 milliLiter(s) IV Bolus once  dextrose 5%. 1000 milliLiter(s) (50 mL/Hr) IV Continuous <Continuous>  dextrose 5%. 1000 milliLiter(s) (100 mL/Hr) IV Continuous <Continuous>  dextrose 50% Injectable 25 Gram(s) IV Push once  dextrose 50% Injectable 12.5 Gram(s) IV Push once  droxidopa 200 milliGRAM(s) Oral three times a day  ferrous    sulfate 325 milliGRAM(s) Oral two times a day  fludroCORTISONE 0.2 milliGRAM(s) Oral daily  glucagon  Injectable 1 milliGRAM(s) IntraMuscular once  insulin glargine Injectable (LANTUS) 8 Unit(s) SubCutaneous at bedtime  insulin lispro (ADMELOG) corrective regimen sliding scale   SubCutaneous <User Schedule>  insulin lispro (ADMELOG) corrective regimen sliding scale   SubCutaneous three times a day before meals  insulin lispro Injectable (ADMELOG) 7 Unit(s) SubCutaneous three times a day before meals  levothyroxine 75 MICROGram(s) Oral daily  midodrine. 15 milliGRAM(s) Oral three times a day  Nephro-molly 1 Tablet(s) Oral daily  pantoprazole  Injectable 40 milliGRAM(s) IV Push two times a day  triamcinolone 0.1% Ointment 1 Application(s) Topical two times a day    MEDICATIONS  (PRN):  dextrose Oral Gel 15 Gram(s) Oral once PRN Blood Glucose LESS THAN 70 milliGRAM(s)/deciliter      Objective:    Vitals: Vital Signs Last 24 Hrs  T(C): 36.9 (05-26-24 @ 04:31), Max: 36.9 (05-25-24 @ 08:19)  T(F): 98.5 (05-26-24 @ 04:31), Max: 98.5 (05-25-24 @ 08:19)  HR: 87 (05-26-24 @ 04:31) (82 - 93)  BP: 100/64 (05-26-24 @ 04:31) (100/64 - 139/70)  BP(mean): --  RR: 18 (05-26-24 @ 04:31) (18 - 18)  SpO2: 94% (05-26-24 @ 04:31) (94% - 96%)                I&O's Summary    24 May 2024 07:01  -  25 May 2024 07:00  --------------------------------------------------------  IN: 360 mL / OUT: 1225 mL / NET: -865 mL    25 May 2024 07:01  -  26 May 2024 06:10  --------------------------------------------------------  IN: 740 mL / OUT: 400 mL / NET: 340 mL        PHYSICAL EXAM:  GENERAL: NAD  HEAD:  Atraumatic, Normocephalic  EYES: EOMI, PERRL, conjunctiva and sclera clear  ENMT: Moist mucous membranes  NECK: Supple  CHEST/LUNG: Clear to auscultation bilaterally; No rales, rhonchi, wheezing, or rubs  HEART: Regular rate and rhythm, 4/6 systolic murmur  ABDOMEN: Soft, Nontender, Nondistended; Bowel sounds present  EXTREMITIES:  2+ Peripheral Pulses, 2+ b/l edema up to thighs wrapped  SKIN: generalized rash resolved, patient now with pruritic erythemic macular rash on LUE resolving, possible rash on RUE developing  NERVOUS SYSTEM:  Alert, no focal deficits  PSYCH:  Appropriate affect    LABS:                        11.0   10.34 )-----------( 172      ( 25 May 2024 07:16 )             37.2                         11.7   9.17  )-----------( 181      ( 24 May 2024 20:12 )             40.4                         10.7   9.64  )-----------( 188      ( 24 May 2024 10:46 )             35.4     05-25    137  |  102  |  37<H>  ----------------------------<  75  4.2   |  23  |  2.00<H>  05-24    137  |  101  |  39<H>  ----------------------------<  187<H>  4.0   |  25  |  2.03<H>  05-23    136  |  101  |  38<H>  ----------------------------<  159<H>  4.0   |  24  |  2.21<H>    Ca    8.7      25 May 2024 07:16  Ca    8.8      24 May 2024 10:46  Ca    8.4      23 May 2024 06:48  Phos  3.8     05-25  Mg     2.0     05-25    TPro  6.0  /  Alb  2.6<L>  /  TBili  0.5  /  DBili  x   /  AST  20  /  ALT  9<L>  /  AlkPhos  83  05-25  TPro  6.1  /  Alb  2.8<L>  /  TBili  0.5  /  DBili  x   /  AST  24  /  ALT  6<L>  /  AlkPhos  85  05-24  TPro  6.0  /  Alb  2.9<L>  /  TBili  0.4  /  DBili  x   /  AST  20  /  ALT  10  /  AlkPhos  83  05-23    CAPILLARY BLOOD GLUCOSE      POCT Blood Glucose.: 182 mg/dL (26 May 2024 02:12)  POCT Blood Glucose.: 82 mg/dL (25 May 2024 21:44)  POCT Blood Glucose.: 115 mg/dL (25 May 2024 17:16)  POCT Blood Glucose.: 115 mg/dL (25 May 2024 12:17)  POCT Blood Glucose.: 173 mg/dL (25 May 2024 10:10)  POCT Blood Glucose.: 79 mg/dL (25 May 2024 08:55)        Urinalysis Basic - ( 25 May 2024 07:16 )    Color: x / Appearance: x / SG: x / pH: x  Gluc: 75 mg/dL / Ketone: x  / Bili: x / Urobili: x   Blood: x / Protein: x / Nitrite: x   Leuk Esterase: x / RBC: x / WBC x   Sq Epi: x / Non Sq Epi: x / Bacteria: x          RADIOLOGY & ADDITIONAL TESTS:    Imaging Personally Reviewed:  [x ] YES  [ ] NO    Consultants involved in case:   Consultant(s) Notes Reviewed:  [ x] YES  [ ] NO:   Care Discussed with Consultants/Other Providers [x ] YES  [ ] NO         ***************************************************************  Jairo Kaufman, PG1  Internal Medicine   Pager:  TEAMS  ***************************************************************    EVEYLN LOPEZ  73y  MRN: [id number]    Patient is a 73y old  Female who presents with a chief complaint of Syncope (25 May 2024 20:32)      Interval/Overnight Events: no events ON.     Subjective: Pt seen and examined at bedside.  Feels well this morning    MEDICATIONS  (STANDING):  ammonium lactate 12% Lotion 1 Application(s) Topical <User Schedule>  apixaban 5 milliGRAM(s) Oral two times a day  aspirin  chewable 81 milliGRAM(s) Oral daily  atorvastatin 80 milliGRAM(s) Oral at bedtime  calamine/zinc oxide Lotion 1 Application(s) Topical three times a day  dextrose 10% Bolus 125 milliLiter(s) IV Bolus once  dextrose 5%. 1000 milliLiter(s) (50 mL/Hr) IV Continuous <Continuous>  dextrose 5%. 1000 milliLiter(s) (100 mL/Hr) IV Continuous <Continuous>  dextrose 50% Injectable 25 Gram(s) IV Push once  dextrose 50% Injectable 12.5 Gram(s) IV Push once  droxidopa 200 milliGRAM(s) Oral three times a day  ferrous    sulfate 325 milliGRAM(s) Oral two times a day  fludroCORTISONE 0.2 milliGRAM(s) Oral daily  glucagon  Injectable 1 milliGRAM(s) IntraMuscular once  insulin glargine Injectable (LANTUS) 8 Unit(s) SubCutaneous at bedtime  insulin lispro (ADMELOG) corrective regimen sliding scale   SubCutaneous <User Schedule>  insulin lispro (ADMELOG) corrective regimen sliding scale   SubCutaneous three times a day before meals  insulin lispro Injectable (ADMELOG) 7 Unit(s) SubCutaneous three times a day before meals  levothyroxine 75 MICROGram(s) Oral daily  midodrine. 15 milliGRAM(s) Oral three times a day  Nephro-molly 1 Tablet(s) Oral daily  pantoprazole  Injectable 40 milliGRAM(s) IV Push two times a day  triamcinolone 0.1% Ointment 1 Application(s) Topical two times a day    MEDICATIONS  (PRN):  dextrose Oral Gel 15 Gram(s) Oral once PRN Blood Glucose LESS THAN 70 milliGRAM(s)/deciliter      Objective:    Vitals: Vital Signs Last 24 Hrs  T(C): 36.9 (05-26-24 @ 04:31), Max: 36.9 (05-25-24 @ 08:19)  T(F): 98.5 (05-26-24 @ 04:31), Max: 98.5 (05-25-24 @ 08:19)  HR: 87 (05-26-24 @ 04:31) (82 - 93)  BP: 100/64 (05-26-24 @ 04:31) (100/64 - 139/70)  BP(mean): --  RR: 18 (05-26-24 @ 04:31) (18 - 18)  SpO2: 94% (05-26-24 @ 04:31) (94% - 96%)                I&O's Summary    24 May 2024 07:01  -  25 May 2024 07:00  --------------------------------------------------------  IN: 360 mL / OUT: 1225 mL / NET: -865 mL    25 May 2024 07:01  -  26 May 2024 06:10  --------------------------------------------------------  IN: 740 mL / OUT: 400 mL / NET: 340 mL        PHYSICAL EXAM:  GENERAL: NAD  HEAD:  Atraumatic, Normocephalic  EYES: EOMI, PERRL, conjunctiva and sclera clear  ENMT: Moist mucous membranes  NECK: Supple  CHEST/LUNG: Clear to auscultation bilaterally; No rales, rhonchi, wheezing, or rubs  HEART: Regular rate and rhythm, 4/6 systolic murmur  ABDOMEN: Soft, Nontender, Nondistended; Bowel sounds present  EXTREMITIES:  2+ Peripheral Pulses, 1-2+ b/l edema up to thighs wrapped  SKIN: generalized rash resolved, patient now with pruritic erythemic macular rash on LUE resolving, possible rash on RUE developing  NERVOUS SYSTEM:  Alert, no focal deficits  PSYCH:  Appropriate affect    LABS:                        11.0   10.34 )-----------( 172      ( 25 May 2024 07:16 )             37.2                         11.7   9.17  )-----------( 181      ( 24 May 2024 20:12 )             40.4                         10.7   9.64  )-----------( 188      ( 24 May 2024 10:46 )             35.4     05-25    137  |  102  |  37<H>  ----------------------------<  75  4.2   |  23  |  2.00<H>  05-24    137  |  101  |  39<H>  ----------------------------<  187<H>  4.0   |  25  |  2.03<H>  05-23    136  |  101  |  38<H>  ----------------------------<  159<H>  4.0   |  24  |  2.21<H>    Ca    8.7      25 May 2024 07:16  Ca    8.8      24 May 2024 10:46  Ca    8.4      23 May 2024 06:48  Phos  3.8     05-25  Mg     2.0     05-25    TPro  6.0  /  Alb  2.6<L>  /  TBili  0.5  /  DBili  x   /  AST  20  /  ALT  9<L>  /  AlkPhos  83  05-25  TPro  6.1  /  Alb  2.8<L>  /  TBili  0.5  /  DBili  x   /  AST  24  /  ALT  6<L>  /  AlkPhos  85  05-24  TPro  6.0  /  Alb  2.9<L>  /  TBili  0.4  /  DBili  x   /  AST  20  /  ALT  10  /  AlkPhos  83  05-23    CAPILLARY BLOOD GLUCOSE      POCT Blood Glucose.: 182 mg/dL (26 May 2024 02:12)  POCT Blood Glucose.: 82 mg/dL (25 May 2024 21:44)  POCT Blood Glucose.: 115 mg/dL (25 May 2024 17:16)  POCT Blood Glucose.: 115 mg/dL (25 May 2024 12:17)  POCT Blood Glucose.: 173 mg/dL (25 May 2024 10:10)  POCT Blood Glucose.: 79 mg/dL (25 May 2024 08:55)        Urinalysis Basic - ( 25 May 2024 07:16 )    Color: x / Appearance: x / SG: x / pH: x  Gluc: 75 mg/dL / Ketone: x  / Bili: x / Urobili: x   Blood: x / Protein: x / Nitrite: x   Leuk Esterase: x / RBC: x / WBC x   Sq Epi: x / Non Sq Epi: x / Bacteria: x          RADIOLOGY & ADDITIONAL TESTS:    Imaging Personally Reviewed:  [x ] YES  [ ] NO    Consultants involved in case:   Consultant(s) Notes Reviewed:  [ x] YES  [ ] NO:   Care Discussed with Consultants/Other Providers [x ] YES  [ ] NO

## 2024-05-26 NOTE — PROGRESS NOTE ADULT - ATTENDING COMMENTS
73F PMH: HFrEF (EF 30%), AS, LBBB, HTN, DM1, CKD, hypothyroidism, chronic lymphedema, LELIA (3L home O2) p/w for syncope 2/2 severe AS, s/p TAVR 4/24. Course complicated by contrast induced allergic reaction (had CTA for TAVR eval) and contrast related renal failure (acute on chronic) requiring HD. TAVR c/b VT and vfib requiring shock and flash pulmonary edema causing Acute hypoxic respiratory failure  requiring intubation.     Course complicated by contrast induced allergic reaction (had CTA for TAVR eval) and contrast related renal failure (acute on chronic) requiring CRRT for fluid removal and pressors for vasoplegia and hypovolemia due to CRRT. Patient extubated, now off pressors, CRRT.       1.Melena- patient states does not recall episode from yesterday. Continue to monitor CBC--> GI recommend monitoring and to hold AC if dropping.  Discontinued bowel regimen   2. Orthostatic hypotension- droxydopa, midodrine with florinef added yesterday. patient's orthostatics still positive. Dose of Florinef increased today(5/25).   3. Left UE DVT- c/w eliquis. Appreciate derm for rash--> per derm is resolving erythrodermic drug eruption  4.T1DM- uncontrolled with intermittent episodes of labile BS- . Being managed by endocrinology. Currently on Lantus and Admelog  5. DUNCAN on CKD stage 4- cr stable  6. HFrEF- currently GDMT is on hold secondary to OH. May need to consider diuresing soon.     So far doing well with Trial of Void.   PT consult.   Continue to monitor orthostatics daily.

## 2024-05-26 NOTE — PROGRESS NOTE ADULT - PROBLEM SELECTOR PLAN 3
Patient has waxing and waning swelling of left upper extremity during hospitalization, per Catrina.  Noted on exam on 5/16.  DVT Duplex ultrasound Bilateral upper extremities and found to have left axial, brachial, and subclavian DVT on imaging 5/16.  Now with pruritic rash    - Begun heparin gtt (5/16), discontinued 5/17  - Eliquis load 10mg BID x 7 days completed (5/17 - 5/23).  - Continue Eliquis 5mg BID x 90 days for continued AC (5/24 - )  - Dermatology consult for rash rec topical steroid cream appears to be improving

## 2024-05-26 NOTE — PROGRESS NOTE ADULT - ASSESSMENT
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 Course complicated by contrast induced allergic reaction (had CTA for TAVR eval) and contrast related renal failure (acute on chronic) requiring CRRT for fluid removal and pressors for vasoplegia and hypovolemia due to CRRT. Patient now extubated, off pressors, CRRT and downgraded to medicine floors now c/b severe orthostatic hypotension, now with GI bleed

## 2024-05-26 NOTE — PROGRESS NOTE ADULT - TIME BILLING
73F PMH: HFrEF (EF 30%), AS, LBBB, HTN, DM1, CKD, hypothyroidism, chronic lymphedema, LELIA (3L home O2) p/w for syncope 2/2 severe AS, s/p TAVR 4/24. Course complicated by contrast induced allergic reaction (had CTA for TAVR eval) and contrast related renal failure (acute on chronic) requiring HD. TAVR c/b VT and vfib requiring shock and flash pulmonary edema causing Acute hypoxic respiratory failure  requiring intubation.     Course complicated by contrast induced allergic reaction (had CTA for TAVR eval) and contrast related renal failure (acute on chronic) requiring CRRT for fluid removal and pressors for vasoplegia and hypovolemia due to CRRT. Patient extubated, now off pressors, CRRT.       1.Melena- patient states does not recall episode from yesterday. Continue to monitor CBC--> GI recommend monitoring and to hold AC if dropping.  Discontinued bowel regimen   2. Orthostatic hypotension- droxydopa, midodrine with florinef added yesterday. patient's orthostatics still positive. Dose of Florinef increased today(5/25).   3. Left UE DVT- c/w eliquis. Appreciate derm for rash--> per derm is resolving erythrodermic drug eruption  4.T1DM- uncontrolled with intermittent episodes of labile BS- . Being managed by endocrinology. Currently on Lantus and Admelog  5. DUNCAN on CKD stage 4- cr stable  6. HFrEF- currently GDMT is on hold secondary to OH. May need to consider diuresing soon.     So far doing well with Trial of Void.   PT consult.   Continue to monitor orthostatics daily. Time-based billing (NON-critical care).     The necessity of the time spent during the encounter on this date of service was due to:     - Ordering, reviewing, and interpreting labs, testing, and imaging.  - Independently obtaining a review of systems and performing a physical exam  - Reviewing prior hospitalization and where necessary, outpatient records.  - Counselling and educating patient  regarding interpretation of aforementioned items and plan of care.

## 2024-05-26 NOTE — CHART NOTE - NSCHARTNOTEFT_GEN_A_CORE
POCT Blood Glucose:  152 mg/dL (05-26-24 @ 21:14)  195 mg/dL (05-26-24 @ 17:25)  224 mg/dL (05-26-24 @ 12:34)  237 mg/dL (05-26-24 @ 08:31)  182 mg/dL (05-26-24 @ 02:12)  82 mg/dL (05-25-24 @ 21:44)      eMAR:  atorvastatin   80 milliGRAM(s) Oral (05-25-24 @ 21:42)    fludroCORTISONE   0.2 milliGRAM(s) Oral (05-26-24 @ 05:31)   0.2 milliGRAM(s) Oral (05-25-24 @ 21:42)    insulin glargine Injectable (LANTUS)   8 Unit(s) SubCutaneous (05-25-24 @ 22:42)    insulin lispro (ADMELOG) corrective regimen sliding scale   1 Unit(s) SubCutaneous (05-26-24 @ 17:34)   2 Unit(s) SubCutaneous (05-26-24 @ 12:42)   2 Unit(s) SubCutaneous (05-26-24 @ 08:49)    insulin lispro Injectable (ADMELOG)   5 Unit(s) SubCutaneous (05-26-24 @ 17:34)   5 Unit(s) SubCutaneous (05-26-24 @ 12:42)   5 Unit(s) SubCutaneous (05-26-24 @ 08:48)    levothyroxine   75 MICROGram(s) Oral (05-26-24 @ 05:31)    Diet, Consistent Carbohydrate w/Evening Snack (05-14-24 @ 17:35) [Active]      Chart reviewed and spoke with patient. she is tolerating POs, eating full meals   Bgs variable 82-200s. Tightly controlle BG 82 at HS yesterday  and post prandial hyperglycemia today     - Will increase Lantus to 10 units at HS   - Will adjust Admelog 6-6-5 units before meals ( Hold if NPO)    Contact via Microsoft Teams during business hours  To reach covering provider access AMION via sunrise tools  For Urgent matters/after-hours/weekends/holidays please page endocrine fellow on call   For nonurgent matters please email [email or web address]    Please note that this patient may be followed by different provider tomorrow.  Notify endocrine 24 hours prior to discharge for final recommendations

## 2024-05-26 NOTE — PROGRESS NOTE ADULT - NSPROGADDITIONALINFOA_GEN_ALL_CORE
#Adrenal Nodule  Incidental finding on CT C/A/P w/ thickened L adrenal gland and 1.2 x 0.8 cm L adrenal nodule.  Two positive DST, concerning for cushing syndrome. Endo following.  - no indication for txt at this time  - f/u outpatient w/ Dr. Maynor Dumont for repeat testing.    #UTI  U/A w/ large LE, 501 WBC, 5 RBC and mod bacteria. Urine cx growing 10-49k Vancomycin resistant enterococcus faecalis. Will hold on amoxicillin given asymptomatic nature of UTI.  RESOLVED  - s/p ceftriaxone (5/8-5/10)

## 2024-05-27 NOTE — PROGRESS NOTE ADULT - PROBLEM SELECTOR PLAN 11
on 3L NC at home. Not on CPAP. Hospital course complicated w/ AHRF 2/2 to flash pulm edema requiring intubation. Now extubated and stable on NC    - continue to monitor. on 3L NC at home. Not on CPAP. Hospital course complicated w/ AHRF 2/2 to flash pulm edema requiring intubation. Now extubated and stable on NC  - continue to monitor.

## 2024-05-27 NOTE — PROGRESS NOTE ADULT - TIME BILLING
- Review of records, telemetry, vital signs and daily labs.   - General and cardiovascular physical examination.  - Generation of cardiovascular treatment plan.  - Coordination of care.      Patient was seen and examined by me on  05/27/2024 ,interim events noted,labs and radiology studies reviewed.  Cuco Tubbs MD,FACC.  [address] [place], [place] [id number].  [phone]

## 2024-05-27 NOTE — PROGRESS NOTE ADULT - ASSESSMENT
73F w HFrEF (EF 30%), mod AS, known LBBB, HTN, IDDM1, CKD, hypothyroidism, chronic lymphedema, p/w 1 episode of syncope with R arm jerking.     #Syncope-Aortic Stenosis  - Known Aortic Stenosis likely Severe in setting of decreased LVEF  - s/p tavr on 4/24 c/b VT -> shock -> VFib -> shock  - hypoxic/congested, and was intubated, now extubated on 4/25 in the evening, on NC   - on 4/25 with worsening bradycardia, and now s/p TVP placement followed by AV Micra placement with removal of TVP  - Remains in Sinus Rhythm/known lbbb with intermittent   - Vaso has been weaned off with the uptitration of midodrine   - On Midodrine 15mg Q8.  - Droxidopa initiated, and increasing dose  - to consider CRT-D in the future  - Cannot initiate GDMT due to orthostasis  - would try to mobilize as best as possible and monitor orthostatics  - cont asa and statin    #Chronic Systolic HF (EF 30%), mod to severe AS, HTN, LBBB, Lymphedema   - Has known systolic HF and AS.    - Repeat TTE showed EF 30%, mild-mod LVH, mildly reduced RVF, mod-severe AS (.61 cmsq), mod pHTN  - On home Torsemide 20 mg daily, Eplerenone 25 mg daily, and Toprol  mg daily, all currently on hold  - On Midodrine 15mg TID for orthostatic hypotension  - Droxidopa now initiated as well for significant orthostasis with PT the other day  - Still orthostatic, so florinef added as well  - Unable to initiate GDMT at this time due to significant orthostatic hypotension  - initially CVVHD, then HD  - HD now on hold, as she is urinating and creatinine remains stable  - prn iv bumex  - renal fu  - Diagnosed with UE DVT and now on full loading dose of Eliquis  - PT and Bed to chair as much as possible  - will follow with you      #Orthostatic hypotension.   -Likely in setting of deconditioning.    Improving with fluids.  Will attempt orthostatics daily.  Hoping to wean off midodrine in order to restart GDMT.  Orthostatics improving from supine to seated, but still problematic with standing.  Remains orthostatic and symptomatic on standing

## 2024-05-27 NOTE — PROGRESS NOTE ADULT - TIME BILLING
Time-based billing (NON-critical care).     The necessity of the time spent during the encounter on this date of service was due to:     - Ordering, reviewing, and interpreting labs, testing, and imaging.  - Independently obtaining a review of systems and performing a physical exam  - Reviewing prior hospitalization and where necessary, outpatient records.  - Counselling and educating patient regarding interpretation of aforementioned items and plan of care.

## 2024-05-27 NOTE — PROGRESS NOTE ADULT - SUBJECTIVE AND OBJECTIVE BOX
[facility]   [phone]  RENAL FOLLOW UP NOTE  --------------------------------------------------------------------------------  Chief Complaint:    24 hour events/subjective:    seen earlier   states overall feels well but has not been out of bed since gets dizzy     PAST HISTORY  --------------------------------------------------------------------------------  No significant changes to PMH, PSH, FHx, SHx, unless otherwise noted    ALLERGIES & MEDICATIONS  --------------------------------------------------------------------------------  Allergies    contrast media (iodine-based) (Fever; Vomiting; Flushing; Hypotension; Rash; Stomach Upset)  Ativan (Rash; Urticaria)  penicillins (Hives (Mod to Severe); Anaphylaxis (Mod to Severe); Short breath (Mod to Severe); Angioedema (Mod to Severe))    Intolerances      Standing Inpatient Medications  ammonium lactate 12% Lotion 1 Application(s) Topical <User Schedule>  apixaban 5 milliGRAM(s) Oral two times a day  aspirin  chewable 81 milliGRAM(s) Oral daily  atorvastatin 80 milliGRAM(s) Oral at bedtime  calamine/zinc oxide Lotion 1 Application(s) Topical three times a day  dextrose 10% Bolus 125 milliLiter(s) IV Bolus once  dextrose 5%. 1000 milliLiter(s) IV Continuous <Continuous>  dextrose 5%. 1000 milliLiter(s) IV Continuous <Continuous>  dextrose 50% Injectable 25 Gram(s) IV Push once  dextrose 50% Injectable 12.5 Gram(s) IV Push once  droxidopa 200 milliGRAM(s) Oral three times a day  ferrous    sulfate 325 milliGRAM(s) Oral two times a day  fludroCORTISONE 0.2 milliGRAM(s) Oral daily  glucagon  Injectable 1 milliGRAM(s) IntraMuscular once  insulin glargine Injectable (LANTUS) 10 Unit(s) SubCutaneous at bedtime  insulin lispro (ADMELOG) corrective regimen sliding scale   SubCutaneous three times a day before meals  insulin lispro (ADMELOG) corrective regimen sliding scale   SubCutaneous <User Schedule>  insulin lispro Injectable (ADMELOG) 5 Unit(s) SubCutaneous before dinner  insulin lispro Injectable (ADMELOG) 6 Unit(s) SubCutaneous before breakfast  insulin lispro Injectable (ADMELOG) 6 Unit(s) SubCutaneous before lunch  levothyroxine 75 MICROGram(s) Oral daily  midodrine. 15 milliGRAM(s) Oral three times a day  Nephro-molly 1 Tablet(s) Oral daily  triamcinolone 0.1% Ointment 1 Application(s) Topical two times a day    PRN Inpatient Medications  dextrose Oral Gel 15 Gram(s) Oral once PRN      REVIEW OF SYSTEMS  --------------------------------------------------------------------------------    Gen: denies  fevers/chills,  CVS: denies chest pain/palpitations  Resp: denies SOB/Cough  GI: Denies N/V/Abd pain  : Denies dysuria    VITALS/PHYSICAL EXAM  --------------------------------------------------------------------------------  T(C): 36.7 (05-27-24 @ 04:53), Max: 36.7 (05-27-24 @ 04:53)  HR: 74 (05-27-24 @ 04:53) (73 - 74)  BP: 122/66 (05-27-24 @ 04:53) (122/66 - 133/77)  RR: 18 (05-27-24 @ 12:16) (17 - 18)  SpO2: 98% (05-27-24 @ 12:16) (95% - 98%)  Wt(kg): --        05-26-24 @ 07:01  -  05-27-24 @ 07:00  --------------------------------------------------------  IN: 380 mL / OUT: 1100 mL / NET: -720 mL    05-27-24 @ 07:01  -  05-27-24 @ 20:10  --------------------------------------------------------  IN: 0 mL / OUT: 500 mL / NET: -500 mL      Physical Exam:  	    	              Gen: comfortable appearing   	Pulm: decrease bs  no rales or ronchi or wheezing  	CV: No JVD. RRR, S1S2; no rub II/VI M   	Abd: +BS, soft, nontender/nondistended, obese   	UE: Warm, no cyanosis  no clubbing, no edema  	LE: Warm, no cyanosis  no clubbing, 2 -  edema,  	Neuro: alert and oriented       LABS/STUDIES  --------------------------------------------------------------------------------              11.9   12.18 >-----------<  172      [05-27-24 @ 10:25]              38.4     136  |  101  |  34  ----------------------------<  139      [05-27-24 @ 10:25]  4.3   |  22  |  2.17        Ca     8.6     [05-27-24 @ 10:25]      Mg     2.0     [05-27-24 @ 10:25]      Phos  3.4     [05-27-24 @ 10:25]    TPro  6.2  /  Alb  2.9  /  TBili  0.5  /  DBili  x   /  AST  23  /  ALT  11  /  AlkPhos  95  [05-27-24 @ 10:25]          Creatinine Trend:  SCr 2.17 [05-27 @ 10:25]  SCr 2.20 [05-26 @ 07:06]  SCr 2.00 [05-25 @ 07:16]  SCr 2.03 [05-24 @ 10:46]  SCr 2.21 [05-23 @ 06:48]        PTH -- (Ca 8.4)      [04-19-24 @ 17:54]   109  TSH 5.06      [04-14-24 @ 07:18]  Lipid: chol 74, TG 70, HDL 33, LDL --      [04-13-24 @ 07:05]

## 2024-05-27 NOTE — PROGRESS NOTE ADULT - PROBLEM SELECTOR PLAN 1
Patient had maroon-colored stool on 5/24, positive for blood.  Hemodynamically stable with stable hemoglobin.  RESOLVED  - GI outpatient Dr Hansel Luna consulted, appreciate recs  - Pantoprazole 40mg IV BID, will transition to PO daily pending continued improvement  - D/c bowel regimen  - Continue Eliquis 5mg BID unless there is a drop in Hb  - Continue diet RESOLVED  Patient had maroon-colored stool on 5/24, positive for blood.  Hemodynamically stable with stable hemoglobin.    - GI outpatient Dr Hansel Luna consulted, appreciate recs  - Pantoprazole 40mg IV BID, will transition to PO daily pending continued improvement  - D/c bowel regimen  - Continue Eliquis 5mg BID unless there is a drop in Hb  - Continue diet Likely in setting of deconditioning.  Improving with fluids.  Will attempt orthostatics daily.  Hoping to wean off midodrine in order to restart GDMT.  Orthostatics improving from supine to seated, but still problematic with standing.  Remains orthostatic and symptomatic on standing  - leg compression, difficulty with abdominal binder due to body habitus   - OOB as tolerated   - midodrine 15mg TID.  - droxidopa 200mg TID  - Increase to fludrocortisone 0.2mg daily  - Continue to evaluate standing orthostatics

## 2024-05-27 NOTE — PROGRESS NOTE ADULT - PROBLEM SELECTOR PLAN 3
Patient has waxing and waning swelling of left upper extremity during hospitalization, per Catrina.  Noted on exam on 5/16.  DVT Duplex ultrasound Bilateral upper extremities and found to have left axial, brachial, and subclavian DVT on imaging 5/16.  Now with pruritic rash    - Begun heparin gtt (5/16), discontinued 5/17  - Eliquis load 10mg BID x 7 days completed (5/17 - 5/23).  - Continue Eliquis 5mg BID x 90 days for continued AC (5/24 - )  - Dermatology consult for rash rec topical steroid cream appears to be improving Patient has waxing and waning swelling of left upper extremity during hospitalization, per Catrina.  Noted on exam on 5/16.  DVT Duplex ultrasound Bilateral upper extremities and found to have left axial, brachial, and subclavian DVT on imaging 5/16.  Now with pruritic rash    - started heparin gtt (5/16), discontinued 5/17  - Eliquis load 10mg BID x 7 days completed (5/17 - 5/23).  - Continue Eliquis 5mg BID x 90 days for continued AC (5/24 - )  - Dermatology consult for rash rec topical steroid cream appears to be improving Patient has waxing and waning swelling of left upper extremity during hospitalization, per Catrina.  Noted on exam on 5/16.  DVT Duplex ultrasound Bilateral upper extremities and found to have left axial, brachial, and subclavian DVT on imaging 5/16.    - started heparin gtt (5/16), discontinued 5/17  - Eliquis load 10mg BID x 7 days completed (5/17 - 5/23).  - Continue Eliquis 5mg BID x 90 days for continued AC (5/24 - )  - Dermatology consult for rash rec topical steroid cream appears to be improving

## 2024-05-27 NOTE — PROGRESS NOTE ADULT - PROBLEM SELECTOR PROBLEM 7
1/22/2020          To Whom It May Concern:    Marge Ferreira was seen in the emergency room on January 18, 2020 and was diagnosed with acute influenza A infection. It is advised that he stay home from work from January 21, 2020 through January 24, 2020. HFrEF (heart failure with reduced ejection fraction)

## 2024-05-27 NOTE — PROGRESS NOTE ADULT - SUBJECTIVE AND OBJECTIVE BOX
MR#[id number]  PATIENT NAME:JOHNATHAN LOPEZ    DATE OF SERVICE: 05-27-24 @ 07:21  Patient was seen and examined by Cuco Tubbs MD on    05-27-24 @ 07:21 .  Interim events noted.Consultant notes ,Labs,Telemetry reviewed by me       HOSPITAL COURSE: HPI:  73F w HFrEF (EF 30%), mod AS, known LBBB, HTN, IDDM1, CKD, hypothyroidism, chronic lymphedema, suspected OHS/LELIA on 3L NC home O2 p/w 1 episode of syncope. Recent admission at hospitals (3/29[facility]4/5) for ADHF and DUNCAN s/p diuresis, discharged with new home O2 3L NC suspecting OHS/LELIA pending outpatient w/u. Since discharge a week ago, she has been staying at home with   Pt had sob walking to car. Once in car, she was still breathing heavily. Partner noticed pt was not responding to verbal stimuli for 10-15sec and witnessed R arm jerking. Pt gained consciousness quickly without postictal symptoms. Pt went to Cardiologist Dr. Nathan who recommended pt to come to ED. No fever, cp, abd pain, n/v. Leg swelling is improved from prior. Pt on torsemide 40mg which she has been taking. Pt was discharged on 3LNC which she is currently on. Patient reports she did not take Farxiga that she was discharged on as she was concerned about side effects.     In ED, patient was found afebrile, hemodynamically stable, breathing well on 3L NC. Initial labs notable for mild hyponatremia, DUNCAN on CKD, elevated proBNP and elevated pCO2 both improved from prior. (12 Apr 2024 16:31)      INTERIM EVENTS:Patient seen at bedside ,interim events noted.  s/p TAVR 4/24 c/b by VT and Vfib requiring shock and flash pulmonary edema requiring intubation.   Course complicated by contrast induced allergic reaction (had CTA for TAVR eval) and contrast related renal failure (acute on chronic) requiring CRRT for fluid removal and pressors for vasoplegia and hypovolemia due to CRRT.   Patient now extubated, off pressors, CRRT and downgraded to medicine floors.    Patient had maroon-colored stool on 5/24, positive for blood.  Hemodynamically stable with stable hemoglobin.  RESOLVED  Awake is less orthostatic on Midodrine Northera and Florinef      PMH -reviewed admission note, no change since admission      +  MEDICATIONS  (STANDING):  ammonium lactate 12% Lotion 1 Application(s) Topical <User Schedule>  apixaban 5 milliGRAM(s) Oral two times a day  aspirin  chewable 81 milliGRAM(s) Oral daily  atorvastatin 80 milliGRAM(s) Oral at bedtime  calamine/zinc oxide Lotion 1 Application(s) Topical three times a day  droxidopa 200 milliGRAM(s) Oral three times a day  ferrous    sulfate 325 milliGRAM(s) Oral two times a day  fludroCORTISONE 0.2 milliGRAM(s) Oral daily  glucagon  Injectable 1 milliGRAM(s) IntraMuscular once  insulin glargine Injectable (LANTUS) 10 Unit(s) SubCutaneous at bedtime  insulin lispro (ADMELOG) corrective regimen sliding scale   SubCutaneous three times a day before meals  insulin lispro (ADMELOG) corrective regimen sliding scale   SubCutaneous <User Schedule>  insulin lispro Injectable (ADMELOG) 6 Unit(s) SubCutaneous before lunch  insulin lispro Injectable (ADMELOG) 5 Unit(s) SubCutaneous before dinner  insulin lispro Injectable (ADMELOG) 6 Unit(s) SubCutaneous before breakfast  levothyroxine 75 MICROGram(s) Oral daily  midodrine. 15 milliGRAM(s) Oral three times a day  Nephro-molly 1 Tablet(s) Oral daily  pantoprazole  Injectable 40 milliGRAM(s) IV Push two times a day  triamcinolone 0.1% Ointment 1 Application(s) Topical two times a day    MEDICATIONS  (PRN):  dextrose Oral Gel 15 Gram(s) Oral once PRN Blood Glucose LESS THAN 70 milliGRAM(s)/deciliter            REVIEW OF SYSTEMS:  Constitutional: [ ] fever, [ ]weight loss,  [x ]fatigue [ ]weight gain  Eyes: [ ] visual changes  Respiratory: [ ]shortness of breath;  [ ] cough, [ ]wheezing, [ ]chills, [ ]hemoptysis  Cardiovascular: [ ] chest pain, [ ]palpitations, [ ]dizziness,  [ xx]leg swelling[ ]orthopnea[ ]PND  Gastrointestinal: [ ] abdominal pain, [ ]nausea, [ ]vomiting,  [ ]diarrhea [ ]Constipation [ ]Melena  Genitourinary: [ ] dysuria, [ ] hematuria [ ][place] Garza  Neurologic: [ ] headaches [ ] tremors[ ]weakness [ ]Paralysis Right[ ] Left[ ]  Skin: [ ] itching, [ ]burning, [ ] rashes  Endocrine: [ ] heat or cold intolerance  Musculoskeletal: [ ] joint pain or swelling; [ ] muscle, back, or extremity pain  Psychiatric: [ ] depression, [ ]anxiety, [ ]mood swings, or [ ]difficulty sleeping  Hematologic: [ ] easy bruising, [ ] bleeding gums    [ ] All remaining systems negative except as per above.   [ ]Unable to obtain.  [x] No change in ROS since admission      Vital Signs Last 24 Hrs  T(C): 36.7 (27 May 2024 04:53), Max: 36.7 (26 May 2024 12:11)  T(F): 98 (27 May 2024 04:53), Max: 98 (26 May 2024 12:11)  HR: 74 (27 May 2024 04:53) (71 - 74)  BP: 122/66 (27 May 2024 04:53) (118/69 - 133/77)  RR: 17 (27 May 2024 04:53) (17 - 18)  SpO2: 95% (27 May 2024 04:53) (95% - 98%)    Parameters below as of 27 May 2024 04:53  Patient On (Oxygen Delivery Method): room air      I&O's Summary    26 May 2024 07:01  -  27 May 2024 07:00  --------------------------------------------------------  IN: 380 mL / OUT: 1100 mL / NET: -720 mL        PHYSICAL EXAM:  General: No acute distress BMI-34  HEENT: EOMI, PERRL  Neck: Supple, [ ] JVD  Lungs: Equal air entry bilaterally; [ ] rales [ ] wheezing [ ] rhonchi  Heart: Regular rate and rhythm; [x ] murmur   2/6 [ x] systolic [ ] diastolic [ ] radiation[ ] rubs [ ]  gallops  Abdomen: Nontender, bowel sounds present  Extremities: No clubbing, cyanosis, [xx ] edema [ ]Pulses  equal and intact  Nervous system:  Alert & Oriented X3, no focal deficits  Psychiatric: Normal affect  Skin: No rashes or lesions    LABS:  05-26    133<L>  |  100  |  35<H>  ----------------------------<  199<H>  4.1   |  22  |  2.20<H>    Ca    8.2<L>      26 May 2024 07:06  Phos  3.6     05-26  Mg     1.9     05-26    TPro  5.6<L>  /  Alb  2.6<L>  /  TBili  0.5  /  DBili  x   /  AST  20  /  ALT  11  /  AlkPhos  79  05-26    Creatinine Trend: 2.20<--, 2.00<--, 2.03<--, 2.21<--, 2.25<--, 2.02<--                        10.7   9.58  )-----------( 178      ( 26 May 2024 07:06 )             35.9        VA Duplex Upper Ext Vein Scan, Bilat (05.16.24 @ 16:47) >  IMPRESSION:  Acute thrombus is present within the left subclavian vein, axillary vein,  and brachial vein.        TTE W or WO Ultrasound Enhancing Agent (05.21.24 @ 16:05) >  CONCLUSIONS:      1. Global left ventricular hypokinesis.   2. Mildly enlarged right ventricular cavity size and probably normal systolic function. Tricuspid annular plane systolic excursion (TAPSE) is 2.6 cm (normal >=1.7 cm).   3. A Benitez Bakari (TAVR) valve replacement is present in the aortic position The prosthetic valve iswell seated with normal function. No intravalvular regurgitation No paravalvular regurgitation.   4. Compared to the transthoracic echocardiogram performed on 4/25/2024, there have been no significant interval changes.   5. Left ventricular endocardium is not well visualized; however, the left ventricular systolic function appears severely reduced.   6. Technically difficult image quality.

## 2024-05-27 NOTE — PROGRESS NOTE ADULT - ASSESSMENT
73F w HFrEF (EF 30%), mod AS, known LBBB, HTN, IDDM1, CKD, hypothyroidism, chronic lymphedema, suspected OHS/LELIA on 3L NC home O2 p/w 1 episode of syncope. Recent admission at [facility] (3/29-4/5) for ADHF and DUNCAN s/p diuresis, discharged with new home O2 3L NC suspecting OHS/LELIA pending outpatient w/u. Since discharge a week ago, she has been staying at home with   Pt had sob walking to car. Once in car, she was still breathing heavily. Partner noticed pt was not responding to verbal stimuli for 10-15sec and witnessed R arm jerking. Pt gained consciousness quickly without postictal symptoms. Pt went to Cardiologist Dr. Nathan who recommended pt to come to ED. No fever, cp, abd pain, n/v. Leg swelling is improved from prior. Pt on torsemide 40mg which she has been taking. Pt was discharged on 3LNC which she is currently on. Patient reports she did not take Farxiga that she was discharged on as she was concerned about side effects.     In ED, patient was found afebrile, hemodynamically stable, breathing well on 3L NC. Initial labs notable for mild hyponatremia, DUNCAN on CKD, elevated proBNP and elevated pCO2 both improved from prior.  pt also noticed with abn creatinine. had severe AS had ct scan with IV contrast had contrast nephropathy and hypotension ---> CRRT required       1- CKD IV  2- CHF   3- lymphedema   4- severe AS  s/p tavr 4/24  5- hypotension orthostatic       creatinine is overall steady in this range  now developing LE edema, ACE bandage is on, she may need to resume diuretics soon  hold diuretics today   orthostatic hypotension,   continue midodrine 15 mg tid,  northera 200 mg TID   florinef 0.1 mg daily  continue to check orthostatic bp  also PT for conditioning   anemia, hb higher  now off epogen   chf is compensated  holding diuretics   non-oliguric, off Hd   hardy --->  for urinary retention  ?TOV  strict I/O

## 2024-05-27 NOTE — PROGRESS NOTE ADULT - SUBJECTIVE AND OBJECTIVE BOX
Marcial Erickson MD  PGY 1 Department of Internal Medicine        Patient is a 73y old  Female who presents with a chief complaint of Syncope (26 May 2024 09:07)      SUBJECTIVE / OVERNIGHT EVENTS: Pt seen and examined. No acute overnight events. Denies fevers, chills, CP, SOB, Abdominal pain, N/V, Constipation, Diarrhea        MEDICATIONS  (STANDING):  ammonium lactate 12% Lotion 1 Application(s) Topical <User Schedule>  apixaban 5 milliGRAM(s) Oral two times a day  aspirin  chewable 81 milliGRAM(s) Oral daily  atorvastatin 80 milliGRAM(s) Oral at bedtime  calamine/zinc oxide Lotion 1 Application(s) Topical three times a day  dextrose 10% Bolus 125 milliLiter(s) IV Bolus once  dextrose 5%. 1000 milliLiter(s) (100 mL/Hr) IV Continuous <Continuous>  dextrose 5%. 1000 milliLiter(s) (50 mL/Hr) IV Continuous <Continuous>  dextrose 50% Injectable 25 Gram(s) IV Push once  dextrose 50% Injectable 12.5 Gram(s) IV Push once  droxidopa 200 milliGRAM(s) Oral three times a day  ferrous    sulfate 325 milliGRAM(s) Oral two times a day  fludroCORTISONE 0.2 milliGRAM(s) Oral daily  glucagon  Injectable 1 milliGRAM(s) IntraMuscular once  insulin glargine Injectable (LANTUS) 10 Unit(s) SubCutaneous at bedtime  insulin lispro (ADMELOG) corrective regimen sliding scale   SubCutaneous three times a day before meals  insulin lispro (ADMELOG) corrective regimen sliding scale   SubCutaneous <User Schedule>  insulin lispro Injectable (ADMELOG) 6 Unit(s) SubCutaneous before lunch  insulin lispro Injectable (ADMELOG) 5 Unit(s) SubCutaneous before dinner  insulin lispro Injectable (ADMELOG) 6 Unit(s) SubCutaneous before breakfast  levothyroxine 75 MICROGram(s) Oral daily  midodrine. 15 milliGRAM(s) Oral three times a day  Nephro-molly 1 Tablet(s) Oral daily  pantoprazole  Injectable 40 milliGRAM(s) IV Push two times a day  triamcinolone 0.1% Ointment 1 Application(s) Topical two times a day    MEDICATIONS  (PRN):  dextrose Oral Gel 15 Gram(s) Oral once PRN Blood Glucose LESS THAN 70 milliGRAM(s)/deciliter      I&O's Summary    26 May 2024 07:01  -  27 May 2024 07:00  --------------------------------------------------------  IN: 380 mL / OUT: 1100 mL / NET: -720 mL        Vital Signs Last 24 Hrs  T(C): 36.7 (27 May 2024 04:53), Max: 36.7 (26 May 2024 12:11)  T(F): 98 (27 May 2024 04:53), Max: 98 (26 May 2024 12:11)  HR: 74 (27 May 2024 04:53) (71 - 74)  BP: 122/66 (27 May 2024 04:53) (118/69 - 133/77)  BP(mean): --  RR: 17 (27 May 2024 04:53) (17 - 18)  SpO2: 95% (27 May 2024 04:53) (95% - 98%)    Parameters below as of 27 May 2024 04:53  Patient On (Oxygen Delivery Method): room air        CAPILLARY BLOOD GLUCOSE      POCT Blood Glucose.: 128 mg/dL (27 May 2024 02:12)  POCT Blood Glucose.: 152 mg/dL (26 May 2024 21:14)  POCT Blood Glucose.: 195 mg/dL (26 May 2024 17:25)  POCT Blood Glucose.: 224 mg/dL (26 May 2024 12:34)  POCT Blood Glucose.: 237 mg/dL (26 May 2024 08:31)      PHYSICAL EXAM:  GENERAL: NAD  HEAD:  Atraumatic, Normocephalic  EYES: EOMI, PERRL, conjunctiva and sclera clear  ENMT: Moist mucous membranes  NECK: Supple  CHEST/LUNG: Clear to auscultation bilaterally; No rales, rhonchi, wheezing, or rubs  HEART: Regular rate and rhythm, 4/6 systolic murmur  ABDOMEN: Soft, Nontender, Nondistended; Bowel sounds present  EXTREMITIES:  2+ Peripheral Pulses, 1-2+ b/l edema up to thighs wrapped  SKIN: generalized rash resolved, patient now with pruritic erythemic macular rash on LUE resolving, possible rash on RUE developing  NERVOUS SYSTEM:  Alert, no focal deficits  PSYCH:  Appropriate affect       LABS:                        10.7   9.58  )-----------( 178      ( 26 May 2024 07:06 )             35.9     Auto Eosinophil # 1.29  / Auto Eosinophil % 13.5  / Auto Neutrophil # 6.21  / Auto Neutrophil % 64.8  / BANDS % x                            11.0   10.34 )-----------( 172      ( 25 May 2024 07:16 )             37.2     Auto Eosinophil # 0.84  / Auto Eosinophil % 8.1   / Auto Neutrophil # 7.91  / Auto Neutrophil % 76.4  / BANDS % x        05-26    133<L>  |  100  |  35<H>  ----------------------------<  199<H>  4.1   |  22  |  2.20<H>  05-25    137  |  102  |  37<H>  ----------------------------<  75  4.2   |  23  |  2.00<H>    Ca    8.2<L>      26 May 2024 07:06  Mg     1.9     05-26  Phos  3.6     05-26  TPro  5.6<L>  /  Alb  2.6<L>  /  TBili  0.5  /  DBili  x   /  AST  20  /  ALT  11  /  AlkPhos  79  05-26  TPro  6.0  /  Alb  2.6<L>  /  TBili  0.5  /  DBili  x   /  AST  20  /  ALT  9<L>  /  AlkPhos  83  05-25          Urinalysis Basic - ( 26 May 2024 07:06 )    Color: x / Appearance: x / SG: x / pH: x  Gluc: 199 mg/dL / Ketone: x  / Bili: x / Urobili: x   Blood: x / Protein: x / Nitrite: x   Leuk Esterase: x / RBC: x / WBC x   Sq Epi: x / Non Sq Epi: x / Bacteria: x            RADIOLOGY & ADDITIONAL TESTS:    Imaging Personally Reviewed:    Consultant(s) Notes Reviewed:      Care Discussed with Consultants/Other Providers:   Marcial Erickson MD  PGY 1 Department of Internal Medicine        Patient is a 73y old  Female who presents with a chief complaint of Syncope (26 May 2024 09:07)      SUBJECTIVE / OVERNIGHT EVENTS: Pt seen and examined. No acute overnight events. Denies fevers, chills, CP, SOB, Abdominal pain, N/V, Constipation, Diarrhea        MEDICATIONS  (STANDING):  ammonium lactate 12% Lotion 1 Application(s) Topical <User Schedule>  apixaban 5 milliGRAM(s) Oral two times a day  aspirin  chewable 81 milliGRAM(s) Oral daily  atorvastatin 80 milliGRAM(s) Oral at bedtime  calamine/zinc oxide Lotion 1 Application(s) Topical three times a day  dextrose 10% Bolus 125 milliLiter(s) IV Bolus once  dextrose 5%. 1000 milliLiter(s) (100 mL/Hr) IV Continuous <Continuous>  dextrose 5%. 1000 milliLiter(s) (50 mL/Hr) IV Continuous <Continuous>  dextrose 50% Injectable 25 Gram(s) IV Push once  dextrose 50% Injectable 12.5 Gram(s) IV Push once  droxidopa 200 milliGRAM(s) Oral three times a day  ferrous    sulfate 325 milliGRAM(s) Oral two times a day  fludroCORTISONE 0.2 milliGRAM(s) Oral daily  glucagon  Injectable 1 milliGRAM(s) IntraMuscular once  insulin glargine Injectable (LANTUS) 10 Unit(s) SubCutaneous at bedtime  insulin lispro (ADMELOG) corrective regimen sliding scale   SubCutaneous three times a day before meals  insulin lispro (ADMELOG) corrective regimen sliding scale   SubCutaneous <User Schedule>  insulin lispro Injectable (ADMELOG) 6 Unit(s) SubCutaneous before lunch  insulin lispro Injectable (ADMELOG) 5 Unit(s) SubCutaneous before dinner  insulin lispro Injectable (ADMELOG) 6 Unit(s) SubCutaneous before breakfast  levothyroxine 75 MICROGram(s) Oral daily  midodrine. 15 milliGRAM(s) Oral three times a day  Nephro-molly 1 Tablet(s) Oral daily  pantoprazole  Injectable 40 milliGRAM(s) IV Push two times a day  triamcinolone 0.1% Ointment 1 Application(s) Topical two times a day    MEDICATIONS  (PRN):  dextrose Oral Gel 15 Gram(s) Oral once PRN Blood Glucose LESS THAN 70 milliGRAM(s)/deciliter      I&O's Summary    26 May 2024 07:01  -  27 May 2024 07:00  --------------------------------------------------------  IN: 380 mL / OUT: 1100 mL / NET: -720 mL        Vital Signs Last 24 Hrs  T(C): 36.7 (27 May 2024 04:53), Max: 36.7 (26 May 2024 12:11)  T(F): 98 (27 May 2024 04:53), Max: 98 (26 May 2024 12:11)  HR: 74 (27 May 2024 04:53) (71 - 74)  BP: 122/66 (27 May 2024 04:53) (118/69 - 133/77)  BP(mean): --  RR: 17 (27 May 2024 04:53) (17 - 18)  SpO2: 95% (27 May 2024 04:53) (95% - 98%)    Parameters below as of 27 May 2024 04:53  Patient On (Oxygen Delivery Method): room air        CAPILLARY BLOOD GLUCOSE      POCT Blood Glucose.: 128 mg/dL (27 May 2024 02:12)  POCT Blood Glucose.: 152 mg/dL (26 May 2024 21:14)  POCT Blood Glucose.: 195 mg/dL (26 May 2024 17:25)  POCT Blood Glucose.: 224 mg/dL (26 May 2024 12:34)  POCT Blood Glucose.: 237 mg/dL (26 May 2024 08:31)      PHYSICAL EXAM:  GENERAL: NAD  HEAD:  Atraumatic, Normocephalic  EYES: EOMI, conjunctiva and sclera clear  ENMT: Moist mucous membranes  NECK: Supple  CHEST/LUNG: Clear to auscultation bilaterally; No rales, rhonchi, wheezing, or rubs  HEART: Regular rate and rhythm, 4/6 systolic murmur  ABDOMEN: Soft, Nontender, Nondistended; Bowel sounds present  EXTREMITIES:  2+ Peripheral Pulses, 1-2+ b/l edema up to thighs wrapped  SKIN: generalized rash resolved  NERVOUS SYSTEM:  Alert, no focal deficits  PSYCH:  Appropriate affect       LABS:                        10.7   9.58  )-----------( 178      ( 26 May 2024 07:06 )             35.9     Auto Eosinophil # 1.29  / Auto Eosinophil % 13.5  / Auto Neutrophil # 6.21  / Auto Neutrophil % 64.8  / BANDS % x                            11.0   10.34 )-----------( 172      ( 25 May 2024 07:16 )             37.2     Auto Eosinophil # 0.84  / Auto Eosinophil % 8.1   / Auto Neutrophil # 7.91  / Auto Neutrophil % 76.4  / BANDS % x        05-26    133<L>  |  100  |  35<H>  ----------------------------<  199<H>  4.1   |  22  |  2.20<H>  05-25    137  |  102  |  37<H>  ----------------------------<  75  4.2   |  23  |  2.00<H>    Ca    8.2<L>      26 May 2024 07:06  Mg     1.9     05-26  Phos  3.6     05-26  TPro  5.6<L>  /  Alb  2.6<L>  /  TBili  0.5  /  DBili  x   /  AST  20  /  ALT  11  /  AlkPhos  79  05-26  TPro  6.0  /  Alb  2.6<L>  /  TBili  0.5  /  DBili  x   /  AST  20  /  ALT  9<L>  /  AlkPhos  83  05-25          Urinalysis Basic - ( 26 May 2024 07:06 )    Color: x / Appearance: x / SG: x / pH: x  Gluc: 199 mg/dL / Ketone: x  / Bili: x / Urobili: x   Blood: x / Protein: x / Nitrite: x   Leuk Esterase: x / RBC: x / WBC x   Sq Epi: x / Non Sq Epi: x / Bacteria: x            RADIOLOGY & ADDITIONAL TESTS:    Imaging Personally Reviewed:    Consultant(s) Notes Reviewed:      Care Discussed with Consultants/Other Providers:   Marcial Erickson MD  PGY 1 Department of Internal Medicine        Patient is a 73y old  Female who presents with a chief complaint of Syncope (26 May 2024 09:07)      SUBJECTIVE / OVERNIGHT EVENTS: Pt seen and examined. No acute overnight events. She reports having loose bowel movement this morning but otherwise has no acute complaints. Denies fevers, chills, CP, SOB, Abdominal pain, N/V, Constipation        MEDICATIONS  (STANDING):  ammonium lactate 12% Lotion 1 Application(s) Topical <User Schedule>  apixaban 5 milliGRAM(s) Oral two times a day  aspirin  chewable 81 milliGRAM(s) Oral daily  atorvastatin 80 milliGRAM(s) Oral at bedtime  calamine/zinc oxide Lotion 1 Application(s) Topical three times a day  dextrose 10% Bolus 125 milliLiter(s) IV Bolus once  dextrose 5%. 1000 milliLiter(s) (100 mL/Hr) IV Continuous <Continuous>  dextrose 5%. 1000 milliLiter(s) (50 mL/Hr) IV Continuous <Continuous>  dextrose 50% Injectable 25 Gram(s) IV Push once  dextrose 50% Injectable 12.5 Gram(s) IV Push once  droxidopa 200 milliGRAM(s) Oral three times a day  ferrous    sulfate 325 milliGRAM(s) Oral two times a day  fludroCORTISONE 0.2 milliGRAM(s) Oral daily  glucagon  Injectable 1 milliGRAM(s) IntraMuscular once  insulin glargine Injectable (LANTUS) 10 Unit(s) SubCutaneous at bedtime  insulin lispro (ADMELOG) corrective regimen sliding scale   SubCutaneous three times a day before meals  insulin lispro (ADMELOG) corrective regimen sliding scale   SubCutaneous <User Schedule>  insulin lispro Injectable (ADMELOG) 6 Unit(s) SubCutaneous before lunch  insulin lispro Injectable (ADMELOG) 5 Unit(s) SubCutaneous before dinner  insulin lispro Injectable (ADMELOG) 6 Unit(s) SubCutaneous before breakfast  levothyroxine 75 MICROGram(s) Oral daily  midodrine. 15 milliGRAM(s) Oral three times a day  Nephro-molly 1 Tablet(s) Oral daily  pantoprazole  Injectable 40 milliGRAM(s) IV Push two times a day  triamcinolone 0.1% Ointment 1 Application(s) Topical two times a day    MEDICATIONS  (PRN):  dextrose Oral Gel 15 Gram(s) Oral once PRN Blood Glucose LESS THAN 70 milliGRAM(s)/deciliter      I&O's Summary    26 May 2024 07:01  -  27 May 2024 07:00  --------------------------------------------------------  IN: 380 mL / OUT: 1100 mL / NET: -720 mL        Vital Signs Last 24 Hrs  T(C): 36.7 (27 May 2024 04:53), Max: 36.7 (26 May 2024 12:11)  T(F): 98 (27 May 2024 04:53), Max: 98 (26 May 2024 12:11)  HR: 74 (27 May 2024 04:53) (71 - 74)  BP: 122/66 (27 May 2024 04:53) (118/69 - 133/77)  BP(mean): --  RR: 17 (27 May 2024 04:53) (17 - 18)  SpO2: 95% (27 May 2024 04:53) (95% - 98%)    Parameters below as of 27 May 2024 04:53  Patient On (Oxygen Delivery Method): room air        CAPILLARY BLOOD GLUCOSE      POCT Blood Glucose.: 128 mg/dL (27 May 2024 02:12)  POCT Blood Glucose.: 152 mg/dL (26 May 2024 21:14)  POCT Blood Glucose.: 195 mg/dL (26 May 2024 17:25)  POCT Blood Glucose.: 224 mg/dL (26 May 2024 12:34)  POCT Blood Glucose.: 237 mg/dL (26 May 2024 08:31)      PHYSICAL EXAM:  GENERAL: NAD  HEAD:  Atraumatic, Normocephalic  EYES: EOMI, conjunctiva and sclera clear  ENMT: Moist mucous membranes  NECK: Supple  CHEST/LUNG: Clear to auscultation bilaterally; No rales, rhonchi, wheezing, or rubs  HEART: Regular rate and rhythm, systolic murmur  ABDOMEN: Soft, Nontender, Nondistended; Bowel sounds present  EXTREMITIES:  2+ Peripheral Pulses, 1-2+ b/l edema up to thighs wrapped  SKIN: generalized rash resolved  NERVOUS SYSTEM:  Alert, no focal deficits  PSYCH:  Appropriate affect       LABS:                        10.7   9.58  )-----------( 178      ( 26 May 2024 07:06 )             35.9     Auto Eosinophil # 1.29  / Auto Eosinophil % 13.5  / Auto Neutrophil # 6.21  / Auto Neutrophil % 64.8  / BANDS % x                            11.0   10.34 )-----------( 172      ( 25 May 2024 07:16 )             37.2     Auto Eosinophil # 0.84  / Auto Eosinophil % 8.1   / Auto Neutrophil # 7.91  / Auto Neutrophil % 76.4  / BANDS % x        05-26    133<L>  |  100  |  35<H>  ----------------------------<  199<H>  4.1   |  22  |  2.20<H>  05-25    137  |  102  |  37<H>  ----------------------------<  75  4.2   |  23  |  2.00<H>    Ca    8.2<L>      26 May 2024 07:06  Mg     1.9     05-26  Phos  3.6     05-26  TPro  5.6<L>  /  Alb  2.6<L>  /  TBili  0.5  /  DBili  x   /  AST  20  /  ALT  11  /  AlkPhos  79  05-26  TPro  6.0  /  Alb  2.6<L>  /  TBili  0.5  /  DBili  x   /  AST  20  /  ALT  9<L>  /  AlkPhos  83  05-25          Urinalysis Basic - ( 26 May 2024 07:06 )    Color: x / Appearance: x / SG: x / pH: x  Gluc: 199 mg/dL / Ketone: x  / Bili: x / Urobili: x   Blood: x / Protein: x / Nitrite: x   Leuk Esterase: x / RBC: x / WBC x   Sq Epi: x / Non Sq Epi: x / Bacteria: x            RADIOLOGY & ADDITIONAL TESTS:    Imaging Personally Reviewed:    Consultant(s) Notes Reviewed:      Care Discussed with Consultants/Other Providers:

## 2024-05-27 NOTE — PROGRESS NOTE ADULT - PROBLEM SELECTOR PLAN 9
TSH mildly elevated 4.79 - 5.06, FT4 1.3-1.5    - continue home levothyroxine 75mcg qd   - Recheck TSH and FT4 outpatient TSH mildly elevated 4.79 - 5.06, FT4 1.3-1.5  - continue home levothyroxine 75mcg qd   - Recheck TSH and FT4 outpatient

## 2024-05-27 NOTE — PROGRESS NOTE ADULT - PROBLEM SELECTOR PLAN 12
#Need for Prophylactic Measure  DVT ppx: Eliquis  Diet: CC   Dispo: pending clinical course, PT rec HONEY DVT ppx: Eliquis  Diet: CC   Dispo: pending clinical course, PT rec HONEY

## 2024-05-27 NOTE — PROGRESS NOTE ADULT - ATTENDING COMMENTS
73F PMH: HFrEF (EF 30%), AS, LBBB, HTN, DM1, CKD, hypothyroidism, chronic lymphedema, LELIA (3L home O2) p/w for syncope 2/2 severe AS, s/p TAVR 4/24. Course complicated by contrast induced allergic reaction (had CTA for TAVR eval) and contrast related renal failure (acute on chronic) requiring HD. TAVR c/b VT and vfib requiring shock and flash pulmonary edema causing Acute hypoxic respiratory failure  requiring intubation.     Course complicated by contrast induced allergic reaction (had CTA for TAVR eval) and contrast related renal failure (acute on chronic) requiring CRRT for fluid removal and pressors for vasoplegia and hypovolemia due to CRRT. Patient extubated, now off pressors, CRRT.       1.Melena- patient states does not recall episode from yesterday. Continue to monitor CBC--> GI recommend monitoring and to hold AC if dropping.   2. Orthostatic hypotension- droxydopa, midodrine with florinef added yesterday. patient's orthostatics still positive.   Will need to eventually taper off midodrine to restart GDMT for HF.   3. Left UE DVT- c/w eliquis. Appreciate derm for rash--> per derm is resolving erythrodermic drug eruption  4.T1DM- uncontrolled with intermittent episodes of labile BS- . Being managed by endocrinology. Currently on Lantus and Admelog  5. DUNCAN on CKD stage 4- cr stable  6. HFrEF- currently GDMT is on hold secondary to OH. Will need to taper midodrine.       PT consult.   Continue to monitor orthostatics daily.  Followup HF recommendations.

## 2024-05-27 NOTE — PROGRESS NOTE ADULT - PROBLEM SELECTOR PLAN 5
DUNCAN secondary to ATN from hypotension and contrast nephropathy. CKD stage 4. s/p CRRT and bumex drip in CICU.  Cr 3.08-> 3.57 -> 3.66 -> 3.48.  De [place] Garza for urinary retention (placed 5/10 and replaced ~5/17).  Improving and appears stable around 2.2    - f/u nephro recs   - strict I's and O's  - renally dose meds, avoid nephrotoxic agents. DUNCAN secondary to ATN from hypotension and contrast nephropathy. CKD stage 4. s/p CRRT and bumex drip in CICU.  De La Garza for urinary retention (placed 5/10 and replaced ~5/17).  Improving and appears stable around 2.2    - f/u nephro recs   - strict I's and O's  - renally dose meds, avoid nephrotoxic agents.

## 2024-05-27 NOTE — PROGRESS NOTE ADULT - PROBLEM SELECTOR PLAN 10
Chronic lymphedema w/ wounds.     - elevate legs as tolerated, encourage ambulation  - compression stockings, ace wrapping  - f/u wound care recs. Chronic lymphedema w/ wounds.   - elevate legs as tolerated, encourage ambulation  - compression stockings, ace wrapping  - f/u wound care recs.

## 2024-05-27 NOTE — PROGRESS NOTE ADULT - PROBLEM SELECTOR PLAN 7
EF from 4/2024 (post-TAVR) w/ EF 25%, global LV hypokinesis, moderately dilated LV, grade 2 LV diastolic dysfunction, RA moderately dilated.  Repeat TTE with reduced LV function.  TAVR remains well seated    - cards following   - Will f/u with cardiology regarding resumption of GDMT  - continue midodrine 15mg TID   - Continue atorvastatin 80mg daily  - hold home torsemide 20mg qd  - hold home eplerenone 25mg qd  - hold home metoprolol succinate 100mg qd  - restart GDMT as tolerated  - f/u w/ EP outpatient for CRT. EF from 4/2024 (post-TAVR) w/ EF 25%, global LV hypokinesis, moderately dilated LV, grade 2 LV diastolic dysfunction, RA moderately dilated.  Repeat TTE with reduced LV function.  TAVR remains well seated  - cards following   - Will f/u with cardiology regarding resumption of GDMT  - continue midodrine 15mg TID   - Continue atorvastatin 80mg daily  - hold home torsemide 20mg qd  - hold home eplerenone 25mg qd  - hold home metoprolol succinate 100mg qd  - restart GDMT as tolerated  - f/u w/ EP outpatient for CRT.

## 2024-05-27 NOTE — PROGRESS NOTE ADULT - PROBLEM SELECTOR PLAN 2
Likely in setting of deconditioning.  Improving with fluids.  Will attempt orthostatics daily.  Hoping to wean off midodrine in order to restart GDMT.  Orthostatics improving from supine to seated, but still problematic with standing.  Remains orthostatic and symptomatic on standing    - leg compression  - Difficulty with abdominal binder due to body habitus   - PT rec HONEY   - OOB as tolerated   - midodrine 15mg TID.  - droxydopa 200mg TID  - Increase to fludrocortisone 0.2mg daily  - Continue to evaluate standing orthostatics Likely in setting of deconditioning.  Improving with fluids.  Will attempt orthostatics daily.  Hoping to wean off midodrine in order to restart GDMT.  Orthostatics improving from supine to seated, but still problematic with standing.  Remains orthostatic and symptomatic on standing  - leg compression, difficulty with abdominal binder due to body habitus   - OOB as tolerated   - midodrine 15mg TID.  - droxidopa 200mg TID  - Increase to fludrocortisone 0.2mg daily  - Continue to evaluate standing orthostatics RESOLVED  Patient had maroon-colored stool on 5/24, positive for blood.  Hemodynamically stable with stable hemoglobin.    - GI outpatient Dr Hansel Luna consulted, appreciate recs  - Pantoprazole 40mg IV BID, will transition to PO daily pending continued improvement  - D/c bowel regimen  - Continue Eliquis 5mg BID unless there is a drop in Hb  - Continue diet

## 2024-05-28 NOTE — PROGRESS NOTE ADULT - SUBJECTIVE AND OBJECTIVE BOX
[facility]  Franklin Smith PA-C  [address] [place] [address]  [phone]      INTERVAL HPI/OVERNIGHT EVENTS:  Seen and examined  offers no complaints          MEDICATIONS  (STANDING):  ammonium lactate 12% Lotion 1 Application(s) Topical <User Schedule>  apixaban 5 milliGRAM(s) Oral two times a day  aspirin  chewable 81 milliGRAM(s) Oral daily  atorvastatin 80 milliGRAM(s) Oral at bedtime  calamine/zinc oxide Lotion 1 Application(s) Topical three times a day  dextrose 10% Bolus 125 milliLiter(s) IV Bolus once  dextrose 5%. 1000 milliLiter(s) (100 mL/Hr) IV Continuous <Continuous>  dextrose 5%. 1000 milliLiter(s) (50 mL/Hr) IV Continuous <Continuous>  dextrose 50% Injectable 12.5 Gram(s) IV Push once  dextrose 50% Injectable 25 Gram(s) IV Push once  droxidopa 200 milliGRAM(s) Oral three times a day  ferrous    sulfate 325 milliGRAM(s) Oral two times a day  fludroCORTISONE 0.2 milliGRAM(s) Oral daily  glucagon  Injectable 1 milliGRAM(s) IntraMuscular once  insulin glargine Injectable (LANTUS) 8 Unit(s) SubCutaneous at bedtime  insulin lispro (ADMELOG) corrective regimen sliding scale   SubCutaneous three times a day before meals  insulin lispro (ADMELOG) corrective regimen sliding scale   SubCutaneous <User Schedule>  insulin lispro Injectable (ADMELOG) 5 Unit(s) SubCutaneous three times a day before meals  levothyroxine 75 MICROGram(s) Oral daily  midodrine. 15 milliGRAM(s) Oral three times a day  Nephro-molly 1 Tablet(s) Oral daily  pantoprazole  Injectable 40 milliGRAM(s) IV Push two times a day  triamcinolone 0.1% Ointment 1 Application(s) Topical two times a day    MEDICATIONS  (PRN):  dextrose Oral Gel 15 Gram(s) Oral once PRN Blood Glucose LESS THAN 70 milliGRAM(s)/deciliter      Allergies    contrast media (iodine-based) (Fever; Vomiting; Flushing; Hypotension; Rash; Stomach Upset)  Ativan (Rash; Urticaria)  penicillins (Hives (Mod to Severe); Anaphylaxis (Mod to Severe); Short breath (Mod to Severe); Angioedema (Mod to Severe))    Intolerances                ROS:     General:  No wt loss, fevers, chills, night sweats, fatigue,   Eyes:  Good vision, no reported pain  ENT:  No sore throat, pain, runny nose, dysphagia  CV:  No pain, palpitations, hypo/hypertension  Resp:  No dyspnea, cough, tachypnea, wheezing  GI:  No pain, No nausea, No vomiting, No diarrhea, No constipation, No weight loss, No fever, No pruritis, No rectal bleeding, No tarry stools, No dysphagia,  :  No pain, bleeding, incontinence, nocturia  Muscle:  No pain, weakness  Neuro:  No weakness, tingling, memory problems  Psych:  No fatigue, insomnia, mood problems, depression  Endocrine:  No polyuria, polydipsia, cold/heat intolerance  Heme:  No petechiae, ecchymosis, easy bruisability  Skin:  No rash, tattoos, scars, edema      PHYSICAL EXAM  Vital Signs Last 24 Hrs  T(C): 36.9 (26 May 2024 04:31), Max: 36.9 (26 May 2024 04:31)  T(F): 98.5 (26 May 2024 04:31), Max: 98.5 (26 May 2024 04:31)  HR: 87 (26 May 2024 04:31) (87 - 93)  BP: 100/64 (26 May 2024 04:31) (100/64 - 102/61)  BP(mean): --  RR: 18 (26 May 2024 04:31) (18 - 18)  SpO2: 94% (26 May 2024 04:31) (94% - 96%)    Parameters below as of 26 May 2024 04:31  Patient On (Oxygen Delivery Method): room air          GENERAL:  Appears stated age  HEENT:  NC/AT  CHEST:  Full & symmetric excursion  HEART:  Regular rhythm  ABDOMEN:  Soft, non-tender, non-distended  EXTEREMITIES:  no cyanosis  SKIN:  No rash  NEURO:  Alert            LABS:                        10.7   9.58  )-----------( 178      ( 26 May 2024 07:06 )             35.9     05-26    133<L>  |  100  |  35<H>  ----------------------------<  199<H>  4.1   |  22  |  2.20<H>    Ca    8.2<L>      26 May 2024 07:06  Phos  3.6     05-26  Mg     1.9     05-26    TPro  5.6<L>  /  Alb  2.6<L>  /  TBili  0.5  /  DBili  x   /  AST  20  /  ALT  11  /  AlkPhos  79  05-26 05-25-24 @ 07:01  -  05-26-24 @ 07:00  --------------------------------------------------------  IN: 740 mL / OUT: 900 mL / NET: -160 mL            RADIOLOGY & ADDITIONAL TESTS:

## 2024-05-28 NOTE — CHART NOTE - NSCHARTNOTEFT_GEN_A_CORE
NUTRITION FOLLOW UP NOTE    PATIENT SEEN FOR: follow up on 3dsu    SOURCE: [x] Patient  [x] Current Medical Record  [] RN  [] Family/support person at bedside  [] Patient unavailable/inappropriate  [] Other:    CHART REVIEWED/EVENTS NOTED.  [x] No changes to nutrition care plan to note  [] Nutrition Status:    DIET ORDER:   Diet, Consistent Carbohydrate w/Evening Snack (24)      CURRENT DIET ORDER IS:  [x] Appropriate:  [] Inadequate:  [] Other:    NUTRITION INTAKE/PROVISION:  [x] PO: >75%  [] Enteral Nutrition:  [] Parenteral Nutrition:    ANTHROPOMETRICS:  Drug Dosing Weight  Height (cm): 180.3 (2024 10:19)  Weight (kg): 125.8 (16 May 2024 20:51)  BMI (kg/m2): 38.7 (16 May 2024 20:51)  BSA (m2): 2.42 (16 May 2024 20:51)  Weights:   Daily Weight in k.3 (), Weight in k.8 (), Weight in k.8 (), Weight in k.3 (), Weight in k.9 (), Weight in k.9 ()     NUTRITIONALLY PERTINENT MEDICATIONS:  MEDICATIONS  (STANDING):  atorvastatin  dextrose 10% Bolus  dextrose 5%.  dextrose 5%.  dextrose 50% Injectable  dextrose 50% Injectable  droxidopa  ferrous    sulfate  fludroCORTISONE  glucagon  Injectable  insulin glargine Injectable (LANTUS)  insulin lispro (ADMELOG) corrective regimen sliding scale  insulin lispro (ADMELOG) corrective regimen sliding scale  insulin lispro Injectable (ADMELOG)  insulin lispro Injectable (ADMELOG)  insulin lispro Injectable (ADMELOG)  midodrine.  Nephro-molly  pantoprazole    Tablet       NUTRITIONALLY PERTINENT LABS:   Na134 mmol/L<L> Glu 141 mg/dL<H> K+ 4.0 mmol/L Cr  2.40 mg/dL<H> BUN 34 mg/dL<H>  Phos 3.8 mg/dL  Alb 2.9 g/dL<L> ALT 11 U/L AST 23 U/L Alkaline Phosphatase 95 U/L    A1C with Estimated Average Glucose Result: 7.4 % (24 @ 08:21)  A1C with Estimated Average Glucose Result: 6.8 % (01-10-24 @ 08:37)          Finger Sticks:  POCT Blood Glucose.: 142 mg/dL ( @ 12:43)  POCT Blood Glucose.: 156 mg/dL ( @ 08:45)  POCT Blood Glucose.: 119 mg/dL ( @ 02:26)  POCT Blood Glucose.: 96 mg/dL ( @ 21:39)  POCT Blood Glucose.: 87 mg/dL ( @ 17:04)      NUTRITIONALLY PERTINENT MEDICATIONS/LABS:  [x] Reviewed  [] Relevant notes on medications/labs:    EDEMA:  [x] Reviewed  [x] Relevant notes: +1 bilateral legs    GI/ I&O:  [x] Reviewed  [] Relevant notes:  [x] Other: last BM     SKIN:   [] No pressure injuries documented, per nursing flowsheet  [] Pressure injury previously noted  [] Change in pressure injury documentation:  [x] Other: stage 2 sacrum, left heel stage 2    ESTIMATED NEEDS:  [x] No change:  [] Updated:  Energy:  9266-3187 kcal/day (30-35 kcal/kg)  Protein:   g/day (1.2-1.5 g/kg)  Fluid:   ml/day or [x] defer to team  Based on:  lbs/70.3kg with consideration for BMI >40, acute illness, pressure injuries, and DUNCAN on CKD4        NUTRITION DIAGNOSIS:  [x] Prior Dx: Increased nutrient needs (protein, energy) related to increased physiological demand for nutrients as evidenced by stage 2 pressure injuries, HF, and DUNCAN on CKD4.  [] New Dx:    EDUCATION:  [] Yes:  [x] Not appropriate/warranted    NUTRITION CARE PLAN:  1. Diet: continue Consistent Carbohydrate with snack  2. Supplements: none  3. Multivitamin/mineral supplementation: continue Nephro-Molly  4:     [x] Achieved - Continue current nutrition intervention(s)  [] Current medical condition precludes nutrition intervention at this time.    MONITORING AND EVALUATION:   RD remains available upon request and will follow up per protocol.    Name: Renay Fitzpatrick MA, RD, CDN/TEAMS   Available on [facility] TEAMS NUTRITION FOLLOW UP NOTE    PATIENT SEEN FOR: follow up on 3dsu    SOURCE: [x] Patient  [x] Current Medical Record  [] RN  [] Family/support person at bedside  [] Patient unavailable/inappropriate  [] Other:    CHART REVIEWED/EVENTS NOTED.  [x] No changes to nutrition care plan to note  [] Nutrition Status:    DIET ORDER:   Diet, Consistent Carbohydrate w/Evening Snack (24)      CURRENT DIET ORDER IS:  [x] Appropriate:  [] Inadequate:  [] Other:    NUTRITION INTAKE/PROVISION:  [x] PO: >75%  [] Enteral Nutrition:  [] Parenteral Nutrition:    ANTHROPOMETRICS:  Drug Dosing Weight  Height (cm): 180.3 (2024 10:19)  Weight (kg): 125.8 (16 May 2024 20:51)  BMI (kg/m2): 38.7 (16 May 2024 20:51)  BSA (m2): 2.42 (16 May 2024 20:51)  Weights:   Daily Weight in k.3 (), Weight in k.8 (), Weight in k.8 (), Weight in k.3 (), Weight in k.9 (), Weight in k.9 ()     NUTRITIONALLY PERTINENT MEDICATIONS:  MEDICATIONS  (STANDING):  atorvastatin  dextrose 10% Bolus  dextrose 5%.  dextrose 5%.  dextrose 50% Injectable  dextrose 50% Injectable  droxidopa  ferrous    sulfate  fludroCORTISONE  glucagon  Injectable  insulin glargine Injectable (LANTUS)  insulin lispro (ADMELOG) corrective regimen sliding scale  insulin lispro (ADMELOG) corrective regimen sliding scale  insulin lispro Injectable (ADMELOG)  insulin lispro Injectable (ADMELOG)  insulin lispro Injectable (ADMELOG)  midodrine.  Nephro-molly  pantoprazole    Tablet       NUTRITIONALLY PERTINENT LABS:   Na134 mmol/L<L> Glu 141 mg/dL<H> K+ 4.0 mmol/L Cr  2.40 mg/dL<H> BUN 34 mg/dL<H>  Phos 3.8 mg/dL  Alb 2.9 g/dL<L> ALT 11 U/L AST 23 U/L Alkaline Phosphatase 95 U/L    A1C with Estimated Average Glucose Result: 7.4 % (24 @ 08:21)  A1C with Estimated Average Glucose Result: 6.8 % (01-10-24 @ 08:37)          Finger Sticks:  POCT Blood Glucose.: 142 mg/dL ( @ 12:43)  POCT Blood Glucose.: 156 mg/dL ( @ 08:45)  POCT Blood Glucose.: 119 mg/dL ( @ 02:26)  POCT Blood Glucose.: 96 mg/dL ( @ 21:39)  POCT Blood Glucose.: 87 mg/dL ( @ 17:04)      NUTRITIONALLY PERTINENT MEDICATIONS/LABS:  [x] Reviewed  [] Relevant notes on medications/labs:    EDEMA:  [x] Reviewed  [x] Relevant notes: +1 bilateral legs    GI/ I&O:  [x] Reviewed  [] Relevant notes:  [x] Other: last BM     SKIN:   [] No pressure injuries documented, per nursing flowsheet  [] Pressure injury previously noted  [] Change in pressure injury documentation:  [x] Other: stage 2 sacrum, left heel stage 2    ESTIMATED NEEDS:  [x] No change:  [] Updated:  Energy:  4897-0065 kcal/day (30-35 kcal/kg)  Protein:   g/day (1.2-1.5 g/kg)  Fluid:   ml/day or [x] defer to team  Based on:  lbs/70.3kg with consideration for BMI >40, acute illness, pressure injuries, and DUNCAN on CKD4        NUTRITION DIAGNOSIS:  [x] Prior Dx: Increased nutrient needs (protein, energy) related to increased physiological demand for nutrients as evidenced by stage 2 pressure injuries, HF, and DUNCAN on CKD4.  [] New Dx:    EDUCATION:  [] Yes:  [x] Not appropriate/warranted    NUTRITION CARE PLAN:  1. Diet: continue Consistent Carbohydrate with snack  2. Supplements: recommend Kwesi x 2 daily  3. Multivitamin/mineral supplementation: continue Nephro-Molly  4:     [x] Achieved - Continue current nutrition intervention(s)  [] Current medical condition precludes nutrition intervention at this time.    MONITORING AND EVALUATION:   RD remains available upon request and will follow up per protocol.    Name: Renay Fitzpatrick MA, RD, CDN/TEAMS   Available on [facility] TEAMS

## 2024-05-28 NOTE — PROGRESS NOTE ADULT - SUBJECTIVE AND OBJECTIVE BOX
DIABETES FOLLOW UP NOTE: Saw pt earlier today    Chief Complaint: Endocrine consult requested for management of DM    INTERVAL HX: Pt stable, reports tolerating POs and reports eating something with each meal. BG levels 80s to 100s while on present insulin doses. Noted BG 80s and 90s in the pm while on present insulin doses. No hypoglycemia. Creat in 2s.  Remains on Florinef  for orthostatic hypotension. Reports feeling better.    Review of Systems:  General: As above  Cardiovascular: No chest pain, palpitations  Respiratory: No SOB, no cough  GI: No nausea, vomiting, abdominal pain  Endocrine: No polyuria, polydipsia or S&Sx of hypoglycemia    Allergies    contrast media (iodine-based) (Fever; Vomiting; Flushing; Hypotension; Rash; Stomach Upset)  Ativan (Rash; Urticaria)  penicillins (Hives (Mod to Severe); Anaphylaxis (Mod to Severe); Short breath (Mod to Severe); Angioedema (Mod to Severe))    Intolerances      MEDICATIONS:  atorvastatin 80 milliGRAM(s) Oral at bedtime  fludroCORTISONE 0.2 milliGRAM(s) Oral every 12 hours  insulin glargine Injectable (LANTUS) 10 Unit(s) SubCutaneous at bedtime  insulin lispro (ADMELOG) corrective regimen sliding scale   SubCutaneous three times a day before meals  insulin lispro (ADMELOG) corrective regimen sliding scale   SubCutaneous <User Schedule>  insulin lispro Injectable (ADMELOG) 6 Unit(s) SubCutaneous before lunch  insulin lispro Injectable (ADMELOG) 5 Unit(s) SubCutaneous before dinner  insulin lispro Injectable (ADMELOG) 6 Unit(s) SubCutaneous before breakfast      PHYSICAL EXAM:  VITALS: T(C): 36.7 (05-28-24 @ 11:37)  T(F): 98 (05-28-24 @ 11:37), Max: 98.1 (05-27-24 @ 20:55)  HR: 75 (05-28-24 @ 04:50) (74 - 75)  BP: 131/61 (05-28-24 @ 04:50) (131/61 - 131/68)  RR:  (18 - 18)  SpO2:  (94% - 98%)  Wt(kg): --  GENERAL: Female laying in bed in NAD.  Abdomen: Soft, nontender, non distended. + obesity  Extremities: Warm,+ edema in LEs with ace bandages D&I.   NEURO: A&O X3    LABS:  POCT Blood Glucose.: 142 mg/dL (05-28-24 @ 12:43)  POCT Blood Glucose.: 156 mg/dL (05-28-24 @ 08:45)  POCT Blood Glucose.: 119 mg/dL (05-28-24 @ 02:26)  POCT Blood Glucose.: 96 mg/dL (05-27-24 @ 21:39)  POCT Blood Glucose.: 87 mg/dL (05-27-24 @ 17:04)  POCT Blood Glucose.: 126 mg/dL (05-27-24 @ 12:47)  POCT Blood Glucose.: 149 mg/dL (05-27-24 @ 08:33)  POCT Blood Glucose.: 128 mg/dL (05-27-24 @ 02:12)  POCT Blood Glucose.: 152 mg/dL (05-26-24 @ 21:14)  POCT Blood Glucose.: 195 mg/dL (05-26-24 @ 17:25)  POCT Blood Glucose.: 224 mg/dL (05-26-24 @ 12:34)  POCT Blood Glucose.: 237 mg/dL (05-26-24 @ 08:31)  POCT Blood Glucose.: 182 mg/dL (05-26-24 @ 02:12)  POCT Blood Glucose.: 82 mg/dL (05-25-24 @ 21:44)  POCT Blood Glucose.: 115 mg/dL (05-25-24 @ 17:16)                            9.7    8.09  )-----------( 173      ( 28 May 2024 06:42 )             31.5       05-28    134<L>  |  100  |  34<H>  ----------------------------<  141<H>  4.0   |  23  |  2.40<H>    eGFR: 21<L>    Ca    8.4      05-28  Mg     2.0     05-28  Phos  3.8     05-28    TPro  6.2  /  Alb  2.9<L>  /  TBili  0.5  /  DBili  x   /  AST  23  /  ALT  11  /  AlkPhos  95  05-27      Thyroid Function Tests:  05-28 @ 06:42 TSH 10.20 FreeT4 -- T3 -- Anti TPO -- Anti Thyroglobulin Ab -- TSI --      A1C with Estimated Average Glucose Result: 7.4 % (03-30-24 @ 08:21)      Estimated Average Glucose: 166 mg/dL (03-30-24 @ 08:21)      04-13 Chol 74 Direct LDL -- LDL calculated 25 HDL 33<L> Trig 70

## 2024-05-28 NOTE — PROGRESS NOTE ADULT - SUBJECTIVE AND OBJECTIVE BOX
[facility]    Howard Kimble, Cherise, Carlos, Alecia, Herman      [phone]    CHIEF COMPLAINT: Patient is a 73y old  Female who presents with a chief complaint of Syncope (28 May 2024 07:15)      Follow Up: vol ol, orthostasis    Interim history: The patient reports no new symptoms.  Denies chest discomfort and shortness of breath.  No abdominal pain.  No new neurologic symptoms. Did have some mild dizziness yesterday when had orthostatic bp checked      MEDICATIONS  (STANDING):  ammonium lactate 12% Lotion 1 Application(s) Topical <User Schedule>  apixaban 5 milliGRAM(s) Oral two times a day  aspirin  chewable 81 milliGRAM(s) Oral daily  atorvastatin 80 milliGRAM(s) Oral at bedtime  calamine/zinc oxide Lotion 1 Application(s) Topical three times a day  dextrose 10% Bolus 125 milliLiter(s) IV Bolus once  dextrose 5%. 1000 milliLiter(s) (50 mL/Hr) IV Continuous <Continuous>  dextrose 5%. 1000 milliLiter(s) (100 mL/Hr) IV Continuous <Continuous>  dextrose 50% Injectable 25 Gram(s) IV Push once  dextrose 50% Injectable 12.5 Gram(s) IV Push once  droxidopa 200 milliGRAM(s) Oral three times a day  ferrous    sulfate 325 milliGRAM(s) Oral two times a day  fludroCORTISONE 0.2 milliGRAM(s) Oral daily  glucagon  Injectable 1 milliGRAM(s) IntraMuscular once  insulin glargine Injectable (LANTUS) 10 Unit(s) SubCutaneous at bedtime  insulin lispro (ADMELOG) corrective regimen sliding scale   SubCutaneous three times a day before meals  insulin lispro (ADMELOG) corrective regimen sliding scale   SubCutaneous <User Schedule>  insulin lispro Injectable (ADMELOG) 6 Unit(s) SubCutaneous before lunch  insulin lispro Injectable (ADMELOG) 5 Unit(s) SubCutaneous before dinner  insulin lispro Injectable (ADMELOG) 6 Unit(s) SubCutaneous before breakfast  levothyroxine 75 MICROGram(s) Oral daily  midodrine. 15 milliGRAM(s) Oral three times a day  Nephro-molly 1 Tablet(s) Oral daily  pantoprazole    Tablet 40 milliGRAM(s) Oral before breakfast  triamcinolone 0.1% Ointment 1 Application(s) Topical two times a day    MEDICATIONS  (PRN):  dextrose Oral Gel 15 Gram(s) Oral once PRN Blood Glucose LESS THAN 70 milliGRAM(s)/deciliter      REVIEW OF SYSTEMS:  eye, ent, GI, , allergic, dermatologic, musculoskeletal and neurologic are negative except as described above    Vital Signs Last 24 Hrs  T(C): 36.6 (28 May 2024 04:50), Max: 36.7 (27 May 2024 20:55)  T(F): 97.8 (28 May 2024 04:50), Max: 98.1 (27 May 2024 20:55)  HR: 75 (28 May 2024 04:50) (74 - 75)  BP: 131/61 (28 May 2024 04:50) (131/61 - 131/68)  BP(mean): --  RR: 18 (28 May 2024 04:50) (18 - 18)  SpO2: 94% (28 May 2024 04:50) (94% - 98%)    Parameters below as of 28 May 2024 04:50  Patient On (Oxygen Delivery Method): room air        I&O's Summary    27 May 2024 07:01  -  28 May 2024 07:00  --------------------------------------------------------  IN: 240 mL / OUT: 500 mL / NET: -260 mL        Telemetry past 24h:    PHYSICAL EXAM:    Constitutional: obese NAD   HEENT:  MMM, sclerae anicteric, conjunctivae clear, no oral cyanosis.  Pulmonary: Non-labored, breath sounds are clear bilaterally, No wheezing, rales or rhonchi  Cardiovascular: Regular, S1 and S2.  No murmur.  No rubs, gallops or clicks  Gastrointestinal: Bowel Sounds present, soft, nontender.   Lymph: legs wrapped  Neurological: Alert, no focal deficits  Skin: residual scaling and erythema  Psych:  Mood & affect appropriate    LABS: All Labs Reviewed:                        9.7    8.09  )-----------( 173      ( 28 May 2024 06:42 )             31.5                         11.9   12.18 )-----------( 172      ( 27 May 2024 10:25 )             38.4                         10.7   9.58  )-----------( 178      ( 26 May 2024 07:06 )             35.9     28 May 2024 06:40    134    |  100    |  34     ----------------------------<  141    4.0     |  23     |  2.40   27 May 2024 10:25    136    |  101    |  34     ----------------------------<  139    4.3     |  22     |  2.17   26 May 2024 07:06    133    |  100    |  35     ----------------------------<  199    4.1     |  22     |  2.20     Ca    8.4        28 May 2024 06:40  Ca    8.6        27 May 2024 10:25  Ca    8.2        26 May 2024 07:06  Phos  3.8       28 May 2024 06:40  Phos  3.4       27 May 2024 10:25  Phos  3.6       26 May 2024 07:06  Mg     2.0       28 May 2024 06:40  Mg     2.0       27 May 2024 10:25  Mg     1.9       26 May 2024 07:06    TPro  6.2    /  Alb  2.9    /  TBili  0.5    /  DBili  x      /  AST  23     /  ALT  11     /  AlkPhos  95     27 May 2024 10:25  TPro  5.6    /  Alb  2.6    /  TBili  0.5    /  DBili  x      /  AST  20     /  ALT  11     /  AlkPhos  79     26 May 2024 07:06          Blood Culture:        RADIOLOGY:    EKG:    Echo:

## 2024-05-28 NOTE — PROGRESS NOTE ADULT - PROBLEM SELECTOR PLAN 8
Likely delayed contrast reaction vs pantoprazole. Derm biopsy consistent w/ agep. s/p nystatin and triamcinolone cream to affected debi. Improving. Now with new rash  - continue to monitor  - calamine cream, vaseline  - Restart triamcinolone  - Derm re-consulted, appreciate recs Likely delayed contrast reaction vs pantoprazole. Derm biopsy consistent w/ agep. s/p nystatin and triamcinolone cream to affected debi. Improving. Now with new rash  - continue to monitor  - calamine cream, vaseline  - continue triamcinolone  - Derm re-consulted, appreciate recs

## 2024-05-28 NOTE — PROGRESS NOTE ADULT - PROBLEM SELECTOR PLAN 3
Patient has waxing and waning swelling of left upper extremity during hospitalization, per Catrina.  Noted on exam on 5/16.  DVT Duplex ultrasound Bilateral upper extremities and found to have left axial, brachial, and subclavian DVT on imaging 5/16.  - started heparin gtt (5/16), discontinued 5/17  - Eliquis load 10mg BID x 7 days completed (5/17 - 5/23).  - Continue Eliquis 5mg BID x 90 days for continued AC (5/24 - )  - Dermatology consult for rash rec topical steroid cream appears to be improving

## 2024-05-28 NOTE — PROGRESS NOTE ADULT - ASSESSMENT
73F w HFrEF (EF 30%), mod AS, known LBBB, HTN, IDDM1, CKD, hypothyroidism, chronic lymphedema, p/w 1 episode of syncope with R arm jerking, likely 2/2 severe symptomatic AS. Pending CTA imagings for TAVR eval, c/b DUNCAN on CKD, on bumex gtt.      Wound Consult requested to assist w/ management of Psoriasis  Drug reaction  BLE Lymphedema w/ wounds  Incontinence Dermatitis Of buttocks     BLE Sween or LAc Hydrin to intact skin + Adaptic dressing QD  Appreciate Derm Consult for Psoriasis & Drug reaction  BLE elevation & Compression  Moisturize intact skin w/ SWEEN cream BID  Nutrition Consult for optimization        encourage high quality protein, cynthia/ prosource, MVI & Vit C to promote wound healing  Hyperglycemia -  ADA diet and NPH & FS w/ ISS  Continue turning and positioning w/ offloading assistive devices as per protocol  Buttocks/ Sacrum Patito BID and prn soiling        Continue w/ attends under pads and Pericare as per protocol  Waffle Cushion to chair when oob to chair  Continue w/ low air loss pressure redistribution bed surface   Care as per medicine, will follow w/ you  Upon discharge f/u as outpatient at [facility] [address] [place], [place] [phone]  S/w Wound PT and D/w team & RN & attng  Staci Lau PA-C [id number]  Nights/ Weekends/ Holidays please call:  General Surgery Consult pager [phone]) for emergencies  Wound PT for multilayer leg wrapping or VAC issues ([phone])   I spent 35minutes face to face w/ this pt of which more than 50% of the time was spent counseling & coordinating care of this pt.

## 2024-05-28 NOTE — PROGRESS NOTE ADULT - SUBJECTIVE AND OBJECTIVE BOX
Marcial Erickson MD  PGY 1 Department of Internal Medicine        Patient is a 73y old  Female who presents with a chief complaint of Syncope (27 May 2024 20:09)      SUBJECTIVE / OVERNIGHT EVENTS: Pt seen and examined. No acute overnight events. Denies fevers, chills, CP, SOB, Abdominal pain, N/V, Constipation, Diarrhea        MEDICATIONS  (STANDING):  ammonium lactate 12% Lotion 1 Application(s) Topical <User Schedule>  apixaban 5 milliGRAM(s) Oral two times a day  aspirin  chewable 81 milliGRAM(s) Oral daily  atorvastatin 80 milliGRAM(s) Oral at bedtime  calamine/zinc oxide Lotion 1 Application(s) Topical three times a day  dextrose 10% Bolus 125 milliLiter(s) IV Bolus once  dextrose 5%. 1000 milliLiter(s) (50 mL/Hr) IV Continuous <Continuous>  dextrose 5%. 1000 milliLiter(s) (100 mL/Hr) IV Continuous <Continuous>  dextrose 50% Injectable 25 Gram(s) IV Push once  dextrose 50% Injectable 12.5 Gram(s) IV Push once  droxidopa 200 milliGRAM(s) Oral three times a day  ferrous    sulfate 325 milliGRAM(s) Oral two times a day  fludroCORTISONE 0.2 milliGRAM(s) Oral daily  glucagon  Injectable 1 milliGRAM(s) IntraMuscular once  insulin glargine Injectable (LANTUS) 10 Unit(s) SubCutaneous at bedtime  insulin lispro (ADMELOG) corrective regimen sliding scale   SubCutaneous three times a day before meals  insulin lispro (ADMELOG) corrective regimen sliding scale   SubCutaneous <User Schedule>  insulin lispro Injectable (ADMELOG) 5 Unit(s) SubCutaneous before dinner  insulin lispro Injectable (ADMELOG) 6 Unit(s) SubCutaneous before breakfast  insulin lispro Injectable (ADMELOG) 6 Unit(s) SubCutaneous before lunch  levothyroxine 75 MICROGram(s) Oral daily  midodrine. 15 milliGRAM(s) Oral three times a day  Nephro-molly 1 Tablet(s) Oral daily  pantoprazole    Tablet 40 milliGRAM(s) Oral before breakfast  triamcinolone 0.1% Ointment 1 Application(s) Topical two times a day    MEDICATIONS  (PRN):  dextrose Oral Gel 15 Gram(s) Oral once PRN Blood Glucose LESS THAN 70 milliGRAM(s)/deciliter      I&O's Summary    27 May 2024 07:01  -  28 May 2024 07:00  --------------------------------------------------------  IN: 240 mL / OUT: 500 mL / NET: -260 mL        Vital Signs Last 24 Hrs  T(C): 36.6 (28 May 2024 04:50), Max: 36.7 (27 May 2024 20:55)  T(F): 97.8 (28 May 2024 04:50), Max: 98.1 (27 May 2024 20:55)  HR: 75 (28 May 2024 04:50) (74 - 75)  BP: 131/61 (28 May 2024 04:50) (131/61 - 131/68)  BP(mean): --  RR: 18 (28 May 2024 04:50) (18 - 18)  SpO2: 94% (28 May 2024 04:50) (94% - 98%)    Parameters below as of 28 May 2024 04:50  Patient On (Oxygen Delivery Method): room air        CAPILLARY BLOOD GLUCOSE      POCT Blood Glucose.: 119 mg/dL (28 May 2024 02:26)  POCT Blood Glucose.: 96 mg/dL (27 May 2024 21:39)  POCT Blood Glucose.: 87 mg/dL (27 May 2024 17:04)  POCT Blood Glucose.: 126 mg/dL (27 May 2024 12:47)  POCT Blood Glucose.: 149 mg/dL (27 May 2024 08:33)      PHYSICAL EXAM:  GENERAL: NAD  HEAD:  Atraumatic, Normocephalic  EYES: EOMI, conjunctiva and sclera clear  ENMT: Moist mucous membranes  NECK: Supple  CHEST/LUNG: Clear to auscultation bilaterally; No rales, rhonchi, wheezing, or rubs  HEART: Regular rate and rhythm, systolic murmur  ABDOMEN: Soft, Nontender, Nondistended; Bowel sounds present  EXTREMITIES:  2+ Peripheral Pulses, 1-2+ b/l edema up to thighs wrapped  SKIN: generalized rash resolved  NERVOUS SYSTEM:  Alert, no focal deficits  PSYCH:  Appropriate affect       LABS:                        9.7    8.09  )-----------( 173      ( 28 May 2024 06:42 )             31.5     Auto Eosinophil # x     / Auto Eosinophil % x     / Auto Neutrophil # x     / Auto Neutrophil % x     / BANDS % x                            11.9   12.18 )-----------( 172      ( 27 May 2024 10:25 )             38.4     Auto Eosinophil # 1.47  / Auto Eosinophil % 12.1  / Auto Neutrophil # 8.80  / Auto Neutrophil % 72.2  / BANDS % x        05-27    136  |  101  |  34<H>  ----------------------------<  139<H>  4.3   |  22  |  2.17<H>    Ca    8.6      27 May 2024 10:25  Mg     2.0     05-27  Phos  3.4     05-27  TPro  6.2  /  Alb  2.9<L>  /  TBili  0.5  /  DBili  x   /  AST  23  /  ALT  11  /  AlkPhos  95  05-27          Urinalysis Basic - ( 27 May 2024 10:25 )    Color: x / Appearance: x / SG: x / pH: x  Gluc: 139 mg/dL / Ketone: x  / Bili: x / Urobili: x   Blood: x / Protein: x / Nitrite: x   Leuk Esterase: x / RBC: x / WBC x   Sq Epi: x / Non Sq Epi: x / Bacteria: x            RADIOLOGY & ADDITIONAL TESTS:    Imaging Personally Reviewed:    Consultant(s) Notes Reviewed:      Care Discussed with Consultants/Other Providers:   Marcial Erickson MD  PGY 1 Department of Internal Medicine        Patient is a 73y old  Female who presents with a chief complaint of Syncope (27 May 2024 20:09)      SUBJECTIVE / OVERNIGHT EVENTS: Pt seen and examined. No acute overnight events. Denies fevers, chills, CP, SOB, Abdominal pain, N/V, Constipation, Diarrhea        MEDICATIONS  (STANDING):  ammonium lactate 12% Lotion 1 Application(s) Topical <User Schedule>  apixaban 5 milliGRAM(s) Oral two times a day  aspirin  chewable 81 milliGRAM(s) Oral daily  atorvastatin 80 milliGRAM(s) Oral at bedtime  calamine/zinc oxide Lotion 1 Application(s) Topical three times a day  dextrose 10% Bolus 125 milliLiter(s) IV Bolus once  dextrose 5%. 1000 milliLiter(s) (50 mL/Hr) IV Continuous <Continuous>  dextrose 5%. 1000 milliLiter(s) (100 mL/Hr) IV Continuous <Continuous>  dextrose 50% Injectable 25 Gram(s) IV Push once  dextrose 50% Injectable 12.5 Gram(s) IV Push once  droxidopa 200 milliGRAM(s) Oral three times a day  ferrous    sulfate 325 milliGRAM(s) Oral two times a day  fludroCORTISONE 0.2 milliGRAM(s) Oral daily  glucagon  Injectable 1 milliGRAM(s) IntraMuscular once  insulin glargine Injectable (LANTUS) 10 Unit(s) SubCutaneous at bedtime  insulin lispro (ADMELOG) corrective regimen sliding scale   SubCutaneous three times a day before meals  insulin lispro (ADMELOG) corrective regimen sliding scale   SubCutaneous <User Schedule>  insulin lispro Injectable (ADMELOG) 5 Unit(s) SubCutaneous before dinner  insulin lispro Injectable (ADMELOG) 6 Unit(s) SubCutaneous before breakfast  insulin lispro Injectable (ADMELOG) 6 Unit(s) SubCutaneous before lunch  levothyroxine 75 MICROGram(s) Oral daily  midodrine. 15 milliGRAM(s) Oral three times a day  Nephro-molly 1 Tablet(s) Oral daily  pantoprazole    Tablet 40 milliGRAM(s) Oral before breakfast  triamcinolone 0.1% Ointment 1 Application(s) Topical two times a day    MEDICATIONS  (PRN):  dextrose Oral Gel 15 Gram(s) Oral once PRN Blood Glucose LESS THAN 70 milliGRAM(s)/deciliter      I&O's Summary    27 May 2024 07:01  -  28 May 2024 07:00  --------------------------------------------------------  IN: 240 mL / OUT: 500 mL / NET: -260 mL        Vital Signs Last 24 Hrs  T(C): 36.6 (28 May 2024 04:50), Max: 36.7 (27 May 2024 20:55)  T(F): 97.8 (28 May 2024 04:50), Max: 98.1 (27 May 2024 20:55)  HR: 75 (28 May 2024 04:50) (74 - 75)  BP: 131/61 (28 May 2024 04:50) (131/61 - 131/68)  BP(mean): --  RR: 18 (28 May 2024 04:50) (18 - 18)  SpO2: 94% (28 May 2024 04:50) (94% - 98%)    Parameters below as of 28 May 2024 04:50  Patient On (Oxygen Delivery Method): room air        CAPILLARY BLOOD GLUCOSE      POCT Blood Glucose.: 119 mg/dL (28 May 2024 02:26)  POCT Blood Glucose.: 96 mg/dL (27 May 2024 21:39)  POCT Blood Glucose.: 87 mg/dL (27 May 2024 17:04)  POCT Blood Glucose.: 126 mg/dL (27 May 2024 12:47)  POCT Blood Glucose.: 149 mg/dL (27 May 2024 08:33)      PHYSICAL EXAM:  GENERAL: NAD  HEAD:  Atraumatic, Normocephalic  EYES: EOMI, conjunctiva and sclera clear  ENMT: Moist mucous membranes  CHEST/LUNG: Clear to auscultation bilaterally; No rales, rhonchi, wheezing, or rubs  HEART: Regular rate and rhythm, systolic murmur  ABDOMEN: Soft, Nontender, Nondistended; Bowel sounds present  EXTREMITIES:  2+ Peripheral Pulses, 1-2+ b/l edema up to thighs in ace wrap  SKIN: erythematous, macular rash to anterior chest wall, neck, and upper extremities improving  NERVOUS SYSTEM:  Alert, no focal deficits  PSYCH:  Appropriate affect       LABS:                        9.7    8.09  )-----------( 173      ( 28 May 2024 06:42 )             31.5     Auto Eosinophil # x     / Auto Eosinophil % x     / Auto Neutrophil # x     / Auto Neutrophil % x     / BANDS % x                            11.9   12.18 )-----------( 172      ( 27 May 2024 10:25 )             38.4     Auto Eosinophil # 1.47  / Auto Eosinophil % 12.1  / Auto Neutrophil # 8.80  / Auto Neutrophil % 72.2  / BANDS % x        05-27    136  |  101  |  34<H>  ----------------------------<  139<H>  4.3   |  22  |  2.17<H>    Ca    8.6      27 May 2024 10:25  Mg     2.0     05-27  Phos  3.4     05-27  TPro  6.2  /  Alb  2.9<L>  /  TBili  0.5  /  DBili  x   /  AST  23  /  ALT  11  /  AlkPhos  95  05-27          Urinalysis Basic - ( 27 May 2024 10:25 )    Color: x / Appearance: x / SG: x / pH: x  Gluc: 139 mg/dL / Ketone: x  / Bili: x / Urobili: x   Blood: x / Protein: x / Nitrite: x   Leuk Esterase: x / RBC: x / WBC x   Sq Epi: x / Non Sq Epi: x / Bacteria: x            RADIOLOGY & ADDITIONAL TESTS:    Imaging Personally Reviewed:    Consultant(s) Notes Reviewed:      Care Discussed with Consultants/Other Providers:

## 2024-05-28 NOTE — PROGRESS NOTE ADULT - ATTENDING COMMENTS
73F PMH: HFrEF (EF 30%), AS, LBBB, HTN, DM1, CKD, hypothyroidism, chronic lymphedema, LELIA (3L home O2) p/w for syncope 2/2 severe AS, s/p TAVR 4/24. Course complicated by contrast induced allergic reaction (had CTA for TAVR eval) and contrast related renal failure (acute on chronic) requiring HD. TAVR c/b VT and vfib requiring shock and flash pulmonary edema causing Acute hypoxic respiratory failure  requiring intubation.     Course complicated by contrast induced allergic reaction (had CTA for TAVR eval) and contrast related renal failure (acute on chronic) requiring CRRT for fluid removal and pressors for vasoplegia and hypovolemia due to CRRT. Patient extubated, now off pressors, CRRT.        #Orthostatic hypotension- droxydopa, midodrine with florinef. patient's orthostatics still positive. Discusse with nephro- will increase the droxydopa to 400mg. Will also ask structural heart to reval if we are missing anything  # Left UE DVT- c/w eliquis.   #T1DM- uncontrolled with intermittent episodes of labile BS- . Being managed by endocrinology. Currently on Lantus and Admelog  # DUNCAN on CKD stage 4- cr slightly increased- may need IVF  # HFrEF- currently GDMT is on hold secondary to OH.    # Melena- stable- seen by GI- follow CBC

## 2024-05-28 NOTE — PROGRESS NOTE ADULT - SUBJECTIVE AND OBJECTIVE BOX
[facility]   [phone]  RENAL FOLLOW UP NOTE  --------------------------------------------------------------------------------  Chief Complaint:    24 hour events/subjective:    seen earlier  while supine offers no c/o except unable to stand up     PAST HISTORY  --------------------------------------------------------------------------------  No significant changes to PMH, PSH, FHx, SHx, unless otherwise noted    ALLERGIES & MEDICATIONS  --------------------------------------------------------------------------------  Allergies    contrast media (iodine-based) (Fever; Vomiting; Flushing; Hypotension; Rash; Stomach Upset)  Ativan (Rash; Urticaria)  penicillins (Hives (Mod to Severe); Anaphylaxis (Mod to Severe); Short breath (Mod to Severe); Angioedema (Mod to Severe))    Intolerances      Standing Inpatient Medications  ammonium lactate 12% Lotion 1 Application(s) Topical <User Schedule>  apixaban 5 milliGRAM(s) Oral two times a day  aspirin  chewable 81 milliGRAM(s) Oral daily  atorvastatin 80 milliGRAM(s) Oral at bedtime  calamine/zinc oxide Lotion 1 Application(s) Topical three times a day  dextrose 10% Bolus 125 milliLiter(s) IV Bolus once  dextrose 5%. 1000 milliLiter(s) IV Continuous <Continuous>  dextrose 5%. 1000 milliLiter(s) IV Continuous <Continuous>  dextrose 50% Injectable 25 Gram(s) IV Push once  dextrose 50% Injectable 12.5 Gram(s) IV Push once  droxidopa 400 milliGRAM(s) Oral three times a day  ferrous    sulfate 325 milliGRAM(s) Oral two times a day  fludroCORTISONE 0.2 milliGRAM(s) Oral every 12 hours  glucagon  Injectable 1 milliGRAM(s) IntraMuscular once  insulin glargine Injectable (LANTUS) 10 Unit(s) SubCutaneous at bedtime  insulin lispro (ADMELOG) corrective regimen sliding scale   SubCutaneous three times a day before meals  insulin lispro (ADMELOG) corrective regimen sliding scale   SubCutaneous <User Schedule>  insulin lispro Injectable (ADMELOG) 6 Unit(s) SubCutaneous before breakfast  insulin lispro Injectable (ADMELOG) 4 Unit(s) SubCutaneous before dinner  insulin lispro Injectable (ADMELOG) 5 Unit(s) SubCutaneous before lunch  midodrine. 15 milliGRAM(s) Oral three times a day  Nephro-molly 1 Tablet(s) Oral daily  triamcinolone 0.1% Ointment 1 Application(s) Topical two times a day    PRN Inpatient Medications  dextrose Oral Gel 15 Gram(s) Oral once PRN      REVIEW OF SYSTEMS  --------------------------------------------------------------------------------    Gen: denies  fevers/chills,  CVS: denies chest pain/palpitations  Resp: denies SOB/Cough  GI: Denies N/V/Abd pain  : Denies dysuria      VITALS/PHYSICAL EXAM  --------------------------------------------------------------------------------  T(C): 36.8 (05-28-24 @ 20:08), Max: 36.8 (05-28-24 @ 20:08)  HR: 72 (05-28-24 @ 20:08) (72 - 75)  BP: 148/75 (05-28-24 @ 20:08) (131/61 - 148/75)  RR: 18 (05-28-24 @ 20:08) (18 - 18)  SpO2: 99% (05-28-24 @ 20:08) (94% - 99%)  Wt(kg): --        05-27-24 @ 07:01  -  05-28-24 @ 07:00  --------------------------------------------------------  IN: 240 mL / OUT: 500 mL / NET: -260 mL    05-28-24 @ 07:01  -  05-28-24 @ 22:00  --------------------------------------------------------  IN: 100 mL / OUT: 0 mL / NET: 100 mL      Physical Exam:  	           Gen: comfortable appearing   	Pulm: decrease bs  no rales or ronchi or wheezing  	CV: No JVD. RRR, S1S2; no rub II/VI M   	Abd: +BS, soft, nontender/nondistended, obese   	UE: Warm, no cyanosis  no clubbing, no edema  	LE: Warm, no cyanosis  no clubbing, 2 -  edema,  	Neuro: alert and oriented       LABS/STUDIES  --------------------------------------------------------------------------------              9.7    8.09  >-----------<  173      [05-28-24 @ 06:42]              31.5     134  |  100  |  34  ----------------------------<  141      [05-28-24 @ 06:40]  4.0   |  23  |  2.40        Ca     8.4     [05-28-24 @ 06:40]      Mg     2.0     [05-28-24 @ 06:40]      Phos  3.8     [05-28-24 @ 06:40]    TPro  6.2  /  Alb  2.9  /  TBili  0.5  /  DBili  x   /  AST  23  /  ALT  11  /  AlkPhos  95  [05-27-24 @ 10:25]          Creatinine Trend:  SCr 2.40 [05-28 @ 06:40]  SCr 2.17 [05-27 @ 10:25]  SCr 2.20 [05-26 @ 07:06]  SCr 2.00 [05-25 @ 07:16]  SCr 2.03 [05-24 @ 10:46]    PTH -- (Ca 8.4)      [04-19-24 @ 17:54]   109  TSH 10.20      [05-28-24 @ 06:42]  Lipid: chol 74, TG 70, HDL 33, LDL --      [04-13-24 @ 07:05]

## 2024-05-28 NOTE — PROGRESS NOTE ADULT - PROBLEM SELECTOR PLAN 5
DUNCAN secondary to ATN from hypotension and contrast nephropathy. CKD stage 4. s/p CRRT and bumex drip in CICU.  De La Garza for urinary retention (placed 5/10 and replaced ~5/17).  Improving and appears stable around 2.2  - f/u nephro recs   - strict I's and O's  - renally dose meds, avoid nephrotoxic agents. DUNCAN secondary to ATN from hypotension and contrast nephropathy. CKD stage 4. s/p CRRT and bumex drip in CICU.  De La Garza for urinary retention (placed 5/10 and replaced ~5/17).  Improving and appears stable 2.2-2.4  - f/u nephro recs   - strict I's and O's  - renally dose meds, avoid nephrotoxic agents.

## 2024-05-28 NOTE — PROGRESS NOTE ADULT - PROBLEM SELECTOR PLAN 1
- Test BG ac and hs/2am  - Continue with Lantus 10 units at hs.   - Change Admelog dose to 6-5-4 units TIDAC.  Pt to inform staff if not eating or not eating enough  - C/w Low dose admelog correction scale TIDAC, Low dose admelog correction scale QHS and 2am in case of rebound hypo/hyperglycemia  Discharge:  Will be determined according to BG/insulin needs/PO intake  at time of discharge. Basal/bolus insulin doses TBD  - Of note, patient instructed on discharge from recent hospitalization at [facility] to start farxiga for CHF. Given risks of DKA of SGLT2i in T1DM, would not start until better data is available for its benefits.  - Follow up with Dr. Luis Dumont outpatient  -Pt will likely required rehab  F/u with optho/renal/cardiac as out pt

## 2024-05-28 NOTE — PROGRESS NOTE ADULT - SUBJECTIVE AND OBJECTIVE BOX
[facility]-- WOUND TEAM -- FOLLOW UP NOTE  --------------------------------------------------------------------------------    24 hour events/subjective:    Afebrile  Tolerating po w/o n/v  tolerated multilayer wrap-now less swelling, using ACE     legs w/ less swelling & less heavY     NO drainage pain or odor     dry skin improved w/ sween cream  pt w/ slowly improving orthostasis and now on tele floor  s/p TAVR   dc planning to HONEY  wife at bedside- all questions answered to their expressed satisfaction    Diet:  Diet, Consistent Carbohydrate w/Evening Snack:   Kwesi(7 Gm Arginine/7 Gm Glut/1.2 Gm HMB     Qty per Day:  2 (05-28-24 @ 15:11)    ROS: General/ SKIN/ MSK/ Vasc see HPI  all other systems negative    ALLERGIES & MEDICATIONS  --------------------------------------------------------------------------------  Allergies    contrast media (iodine-based) (Fever; Vomiting; Flushing; Hypotension; Rash; Stomach Upset)  Ativan (Rash; Urticaria)  penicillins (Hives (Mod to Severe); Anaphylaxis (Mod to Severe); Short breath (Mod to Severe); Angioedema (Mod to Severe))      STANDING INPATIENT MEDICATIONS  ammonium lactate 12% Lotion 1 Application(s) Topical <User Schedule>  apixaban 5 milliGRAM(s) Oral two times a day  aspirin  chewable 81 milliGRAM(s) Oral daily  atorvastatin 80 milliGRAM(s) Oral at bedtime  calamine/zinc oxide Lotion 1 Application(s) Topical three times a day  dextrose 10% Bolus 125 milliLiter(s) IV Bolus once  dextrose 5%. 1000 milliLiter(s) IV Continuous <Continuous>  dextrose 5%. 1000 milliLiter(s) IV Continuous <Continuous>  dextrose 50% Injectable 25 Gram(s) IV Push once  dextrose 50% Injectable 12.5 Gram(s) IV Push once  droxidopa 400 milliGRAM(s) Oral three times a day  ferrous    sulfate 325 milliGRAM(s) Oral two times a day  fludroCORTISONE 0.2 milliGRAM(s) Oral every 12 hours  glucagon  Injectable 1 milliGRAM(s) IntraMuscular once  insulin glargine Injectable (LANTUS) 10 Unit(s) SubCutaneous at bedtime  insulin lispro (ADMELOG) corrective regimen sliding scale   SubCutaneous three times a day before meals  insulin lispro (ADMELOG) corrective regimen sliding scale   SubCutaneous <User Schedule>  insulin lispro Injectable (ADMELOG) 6 Unit(s) SubCutaneous before breakfast  insulin lispro Injectable (ADMELOG) 4 Unit(s) SubCutaneous before dinner  insulin lispro Injectable (ADMELOG) 5 Unit(s) SubCutaneous before lunch  midodrine. 15 milliGRAM(s) Oral three times a day  Nephro-molly 1 Tablet(s) Oral daily  triamcinolone 0.1% Ointment 1 Application(s) Topical two times a day      PRN INPATIENT MEDICATION  dextrose Oral Gel 15 Gram(s) Oral once PRN        VITALS/PHYSICAL EXAM  --------------------------------------------------------------------------------  T(C): 36.8 (05-28-24 @ 20:08), Max: 36.8 (05-28-24 @ 20:08)  HR: 72 (05-28-24 @ 20:08) (72 - 75)  BP: 148/75 (05-28-24 @ 20:08) (131/61 - 148/75)  RR: 18 (05-28-24 @ 20:08) (18 - 18)  SpO2: 99% (05-28-24 @ 20:08) (94% - 99%)  Wt(kg): --    NAD A&Ox3, MO  WD/ WN/ WG,    VersaCare P500  HEENT:  NC/AT, EOMI, sclera clear, mucosa moist, throat clear, trachea midline, neck supple  Respiratory: nonlabored w/ equal chest rise  Gastrointestinal: soft NT/ND   Neurology: strength & sensation grossly intact,   Psych: calm/ appropriate  Musculoskeletal: FROM, no deformities/ contractures  Vascular: BLE equally warm,  no cyanosis, clubbing, nor acute ischemia       improved BLE edema equal       BLE DP pulses palpable       BLE hemosiderin staining & lymphedema improving          Lt heel dried blister 2cm x 1cm        mechanically debrided w/ warm soapy cloths       No drainage, odor, erythema, increased warmth, tenderness, induration, fluctuance, nor crepitus  Skin: improved dry peeling flakey pale,  scattered psoriatic plaques throughout extremities and torso  Skin folds of lateral torso w/ faint erythema in creases      w/o blistering  or  drainage  No odor, erythema, increased warmth, tenderness, induration, fluctuance, nor crepitus          LABS/ CULTURES/ RADIOLOGY:              9.7    8.09  >-----------<  173      [05-28-24 @ 06:42]              31.5     134  |  100  |  34  ----------------------------<  141      [05-28-24 @ 06:40]  4.0   |  23  |  2.40        Ca     8.4     [05-28-24 @ 06:40]      Mg     2.0     [05-28-24 @ 06:40]      Phos  3.8     [05-28-24 @ 06:40]    TPro  6.2  /  Alb  2.9  /  TBili  0.5  /  DBili  x   /  AST  23  /  ALT  11  /  AlkPhos  95  [05-27-24 @ 10:25]    CAPILLARY BLOOD GLUCOSE  POCT Blood Glucose.: 150 mg/dL (28 May 2024 21:51)  POCT Blood Glucose.: 199 mg/dL (28 May 2024 17:21)  POCT Blood Glucose.: 142 mg/dL (28 May 2024 12:43)  POCT Blood Glucose.: 156 mg/dL (28 May 2024 08:45)  POCT Blood Glucose.: 119 mg/dL (28 May 2024 02:26)      A1C with Estimated Average Glucose Result: 7.4 % (03-30-24 @ 08:21)  A1C with Estimated Average Glucose Result: 6.8 % (01-10-24 @ 08:37)   [facility]-- WOUND TEAM -- FOLLOW UP NOTE  --------------------------------------------------------------------------------    24 hour events/subjective:    Afebrile  Tolerating po w/o n/v  tolerated multilayer wrap-now less swelling, using ACE     legs w/ less swelling & less heavY     NO drainage pain or odor     dry skin improved w/ sween cream  pt w/ slowly improving orthostasis and now on tele floor  s/p TAVR   dc planning to HONEY  wife at bedside- all questions answered to their expressed satisfaction    Diet:  Diet, Consistent Carbohydrate w/Evening Snack:   Kwesi(7 Gm Arginine/7 Gm Glut/1.2 Gm HMB     Qty per Day:  2 (05-28-24 @ 15:11)    ROS: General/ SKIN/ MSK/ Vasc see HPI  all other systems negative    ALLERGIES & MEDICATIONS  --------------------------------------------------------------------------------  Allergies    contrast media (iodine-based) (Fever; Vomiting; Flushing; Hypotension; Rash; Stomach Upset)  Ativan (Rash; Urticaria)  penicillins (Hives (Mod to Severe); Anaphylaxis (Mod to Severe); Short breath (Mod to Severe); Angioedema (Mod to Severe))      STANDING INPATIENT MEDICATIONS  ammonium lactate 12% Lotion 1 Application(s) Topical <User Schedule>  apixaban 5 milliGRAM(s) Oral two times a day  aspirin  chewable 81 milliGRAM(s) Oral daily  atorvastatin 80 milliGRAM(s) Oral at bedtime  calamine/zinc oxide Lotion 1 Application(s) Topical three times a day  dextrose 10% Bolus 125 milliLiter(s) IV Bolus once  dextrose 5%. 1000 milliLiter(s) IV Continuous <Continuous>  dextrose 5%. 1000 milliLiter(s) IV Continuous <Continuous>  dextrose 50% Injectable 25 Gram(s) IV Push once  dextrose 50% Injectable 12.5 Gram(s) IV Push once  droxidopa 400 milliGRAM(s) Oral three times a day  ferrous    sulfate 325 milliGRAM(s) Oral two times a day  fludroCORTISONE 0.2 milliGRAM(s) Oral every 12 hours  glucagon  Injectable 1 milliGRAM(s) IntraMuscular once  insulin glargine Injectable (LANTUS) 10 Unit(s) SubCutaneous at bedtime  insulin lispro (ADMELOG) corrective regimen sliding scale   SubCutaneous three times a day before meals  insulin lispro (ADMELOG) corrective regimen sliding scale   SubCutaneous <User Schedule>  insulin lispro Injectable (ADMELOG) 6 Unit(s) SubCutaneous before breakfast  insulin lispro Injectable (ADMELOG) 4 Unit(s) SubCutaneous before dinner  insulin lispro Injectable (ADMELOG) 5 Unit(s) SubCutaneous before lunch  midodrine. 15 milliGRAM(s) Oral three times a day  Nephro-molly 1 Tablet(s) Oral daily  triamcinolone 0.1% Ointment 1 Application(s) Topical two times a day      PRN INPATIENT MEDICATION  dextrose Oral Gel 15 Gram(s) Oral once PRN        VITALS/PHYSICAL EXAM  --------------------------------------------------------------------------------  VITALS: T(C): 36.7 (05-28-24 @ 11:37)  T(F): 98 (05-28-24 @ 11:37), Max: 98.1 (05-27-24 @ 20:55)  HR: 75 (05-28-24 @ 04:50) (74 - 75)  BP: 131/61 (05-28-24 @ 04:50) (131/61 - 131/68)  RR:  (18 - 18)  SpO2:  (94% - 98%)    NAD A&Ox3, MO  WD/ WN/ WG,    VersaCare P500  HEENT:  NC/AT, EOMI, sclera clear, mucosa moist, throat clear, trachea midline, neck supple  Respiratory: nonlabored w/ equal chest rise  Gastrointestinal: soft NT/ND   Neurology: strength & sensation grossly intact,   Psych: calm/ appropriate  Musculoskeletal: FROM, no deformities/ contractures  Vascular: BLE equally warm,  no cyanosis, clubbing, nor acute ischemia       improved BLE edema equal       BLE DP pulses palpable       BLE hemosiderin staining & lymphedema improving          Lt heel dried blister 2cm x 1cm        mechanically debrided w/ warm soapy cloths       No drainage, odor, erythema, increased warmth, tenderness, induration, fluctuance, nor crepitus  Skin: improved dry peeling flakey pale,  scattered psoriatic plaques throughout extremities and torso  Skin folds of lateral torso w/ faint erythema in creases      w/o blistering  or  drainage  No odor, erythema, increased warmth, tenderness, induration, fluctuance, nor crepitus          LABS/ CULTURES/ RADIOLOGY:              9.7    8.09  >-----------<  173      [05-28-24 @ 06:42]              31.5     134  |  100  |  34  ----------------------------<  141      [05-28-24 @ 06:40]  4.0   |  23  |  2.40        Ca     8.4     [05-28-24 @ 06:40]      Mg     2.0     [05-28-24 @ 06:40]      Phos  3.8     [05-28-24 @ 06:40]    TPro  6.2  /  Alb  2.9  /  TBili  0.5  /  DBili  x   /  AST  23  /  ALT  11  /  AlkPhos  95  [05-27-24 @ 10:25]    CAPILLARY BLOOD GLUCOSE  POCT Blood Glucose.: 150 mg/dL (28 May 2024 21:51)  POCT Blood Glucose.: 199 mg/dL (28 May 2024 17:21)  POCT Blood Glucose.: 142 mg/dL (28 May 2024 12:43)  POCT Blood Glucose.: 156 mg/dL (28 May 2024 08:45)  POCT Blood Glucose.: 119 mg/dL (28 May 2024 02:26)      A1C with Estimated Average Glucose Result: 7.4 % (03-30-24 @ 08:21)  A1C with Estimated Average Glucose Result: 6.8 % (01-10-24 @ 08:37)

## 2024-05-28 NOTE — PROGRESS NOTE ADULT - PROBLEM SELECTOR PLAN 1
Likely in setting of deconditioning.  Improving with fluids.  Will attempt orthostatics daily.  Hoping to wean off midodrine in order to restart GDMT.  Orthostatics improving from supine to seated, but still problematic with standing.  Remains orthostatic and symptomatic on standing  - leg compression, difficulty with abdominal binder due to body habitus   - OOB as tolerated   - midodrine 15mg TID.  - droxidopa 200mg TID  - Continue fludrocortisone 0.2mg daily  - Continue to evaluate standing orthostatics daily Likely in setting of deconditioning.  Improving with fluids.  Will attempt orthostatics daily.  Hoping to wean off midodrine in order to restart GDMT.  Orthostatics improving from supine to seated, but still problematic with standing.  Remains orthostatic and symptomatic on standing  - leg compression, difficulty with abdominal binder due to body habitus   - OOB as tolerated   - midodrine 15mg TID.  - increase to droxydopa 400mg TID  - Continue fludrocortisone 0.2mg daily  - Continue to evaluate standing orthostatics daily

## 2024-05-28 NOTE — PROGRESS NOTE ADULT - PROBLEM SELECTOR PLAN 7
EF from 4/2024 (post-TAVR) w/ EF 25%, global LV hypokinesis, moderately dilated LV, grade 2 LV diastolic dysfunction, RA moderately dilated.  Repeat TTE with reduced LV function.  TAVR remains well seated  - cards following, will f/u with cardiology regarding resuming GDMT  - continue midodrine 15mg TID   - Continue atorvastatin 80mg daily  - hold home torsemide 20mg qd  - hold home eplerenone 25mg qd  - hold home metoprolol succinate 100mg qd  - f/u w/ EP outpatient for CRT.

## 2024-05-28 NOTE — PROGRESS NOTE ADULT - ASSESSMENT
73F w HFrEF (EF 30%), mod AS, known LBBB, HTN, IDDM1, CKD, hypothyroidism, chronic lymphedema, suspected OHS/LELIA on 3L NC home O2 p/w 1 episode of syncope. Recent admission at [facility] (3/29-4/5) for ADHF and DUNCAN s/p diuresis, discharged with new home O2 3L NC suspecting OHS/LELIA pending outpatient w/u. Since discharge a week ago, she has been staying at home with   Pt had sob walking to car. Once in car, she was still breathing heavily. Partner noticed pt was not responding to verbal stimuli for 10-15sec and witnessed R arm jerking. Pt gained consciousness quickly without postictal symptoms. Pt went to Cardiologist Dr. Nathan who recommended pt to come to ED. No fever, cp, abd pain, n/v. Leg swelling is improved from prior. Pt on torsemide 40mg which she has been taking. Pt was discharged on 3LNC which she is currently on. Patient reports she did not take Farxiga that she was discharged on as she was concerned about side effects.     In ED, patient was found afebrile, hemodynamically stable, breathing well on 3L NC. Initial labs notable for mild hyponatremia, DUNCAN on CKD, elevated proBNP and elevated pCO2 both improved from prior.  pt also noticed with abn creatinine. had severe AS had ct scan with IV contrast had contrast nephropathy and hypotension ---> CRRT required       1- CKD IV  2- CHF   3- lymphedema   4- severe AS  s/p tavr 4/24  5- hypotension orthostatic       creatinine is overall steady in this range  now developing LE edema, ACE bandage is on, she may need to resume diuretics soon  hold diuretics   orthostatic hypotension,   continue midodrine 15 mg tid,  increase northera 400 mg TID   florinef increase to 0.2mg bid   continue to check orthostatic bp  also PT for conditioning   anemia, hb higher  now off epogen   chf is compensated  holding diuretics   non-oliguric, off Hd   strict I/O

## 2024-05-28 NOTE — PROGRESS NOTE ADULT - NSPROGADDITIONALINFOA_GEN_ALL_CORE
Detail Level: Zone -Plan discussed with pt/team.  Contact info: [phone] (24/7). pager [phone]  Amion on [place]  Teams on M-T-W-F. Unavailable Thu/Weekends/Holidays  Assessed pt/labs/meds and discussed plan of care with primary team  Adjusting insulin  Discharge plan  Follow up care Detail Level: Simple Detail Level: Detailed

## 2024-05-28 NOTE — PROGRESS NOTE ADULT - PROBLEM SELECTOR PLAN 9
TSH mildly elevated 4.79 - 5.06, FT4 1.3-1.5  - continue home levothyroxine 75mcg qd   - Recheck TSH and FT4 outpatient TSH initially mildly elevated 4.79 - 5.06, FT4 1.3-1.5  TSH of 10.20 on 5/28  - will increase to levothyroxine 88mcg qd   - Recheck TSH and FT4 outpatient

## 2024-05-28 NOTE — PROGRESS NOTE ADULT - ASSESSMENT
Ms Alina Chowdhury is a 72 y/o F with PMHx HFrEF (EF 30%), AS, LBBB, HTN, DM1, CKD, hypothyroidism, chronic lymphedema, LELIA (3L home O2) who presented for syncope 2/2 severe AS, now s/p TAVR 4/24 c/b by VT and Vfib requiring shock and flash pulmonary edema requiring intubation. Course complicated by contrast induced allergic reaction (had CTA for TAVR eval) and contrast related renal failure (acute on chronic) requiring CRRT for fluid removal and pressors for vasoplegia and hypovolemia due to CRRT. Patient now extubated, off pressors, CRRT and downgraded to medicine floors now c/b severe orthostatic hypotension requiring midodrine, droxidopa, and fludrocortisone, as well as melena which has resolved.

## 2024-05-28 NOTE — PROGRESS NOTE ADULT - PROBLEM SELECTOR PLAN 2
- Thyroid Function Tests:  05-28 @ 06:42 TSH 10.20 FreeT4 -- T3 -- Anti TPO -- Anti Thyroglobulin Ab -- TSI --  - Continue home LT4 75mcg daily. Noted LT4 given with Protonix which inhibits LT4 absorption causing TSH levels to go up.   -Check Free T4 (Add on today's TSH)  - PLEASE make sure LT4 is given on an empty stomach at least one hour apart from other meds and food to ensure med absorption. DO NOT GIVE WITH VITAMINS/ANTACIDS. Schedule LT4 for 5 am and Protonix for 8-9am  Spoke to primary team about this.

## 2024-05-28 NOTE — PROGRESS NOTE ADULT - ASSESSMENT
73F w HFrEF (EF 30%), mod AS, known LBBB, HTN, IDDM1, CKD, hypothyroidism, chronic lymphedema, p/w 1 episode of syncope with R arm jerking.     #Syncope-Aortic Stenosis  - Known Aortic Stenosis likely Severe in setting of decreased LVEF  - s/p tavr on 4/24 c/b VT -> shock -> VFib -> shock  - hypoxic/congested, and was intubated, now extubated on 4/25 in the evening, on NC   - on 4/25 with worsening bradycardia, and now s/p TVP placement followed by AV Micra placement with removal of TVP  - Remains in Sinus Rhythm/known lbbb with intermittent   - pressors off and now on high dose midodrine, droxidopa and florinef in the setting of orthostasis   - to consider CRT-D in the future  - Cannot initiate GDMT due to orthostasis  - would try to mobilize as best as possible and monitor orthostatics, hopefully standing bp can be kept >100  - cont asa and statin    #Chronic Systolic HF (EF 30%), mod to severe AS, HTN, LBBB, Lymphedema   - Has known systolic HF and AS.    - Repeat TTE showed EF 30%, mild-mod LVH, mildly reduced RVF, mod-severe AS (.61 cmsq), mod pHTN  - On home Torsemide 20 mg daily, Eplerenone 25 mg daily, and Toprol  mg daily, all currently on hold  - On Midodrine 15mg TID for orthostatic hypotension  - Droxidopa now initiated as well for significant orthostasis  - Still orthostatic, so florinef added as well  - Unable to initiate GDMT at this time due to significant orthostatic hypotension  - initially CVVHD, then HD  - HD now on hold, as she is urinating and creatinine remains stable, though slightly higher today  - prn iv bumex  - renal fu  - Diagnosed with UE DVT and now on full loading dose of Eliquis  - PT and Bed to chair as much as possible  - will follow with you      #Orthostatic hypotension.   -Likely in setting of deconditioning.   - Will attempt orthostatics daily.  Hoping to wean off midodrine in order to restart GDMT.  Orthostatics improving from supine to seated, but still problematic with standing.  Remains orthostatic and symptomatic on standing

## 2024-05-28 NOTE — PROGRESS NOTE ADULT - NUTRITIONAL ASSESSMENT
This patient has been assessed with a concern for Malnutrition and has been determined to have a diagnosis/diagnoses of Morbid obesity (BMI > 40).    This patient is being managed with:   Diet Consistent Carbohydrate w/Evening Snack-  Kwesi(7 Gm Arginine/7 Gm Glut/1.2 Gm HMB     Qty per Day:  2  Entered: May 28 2024  3:10PM

## 2024-05-28 NOTE — PROGRESS NOTE ADULT - ASSESSMENT
73F w/h/o of T1DM with unknown control due to CKD and anemia of chronic disease. DM c/b CKD. Also h/o HFrEF (EF 30%), mod AS, known LBBB, HTN,  hypothyroidism, chronic lymphedema, suspected OHS/LELIA on 3L NC home O2 p/w 1 episode of syncope with R arm jerking, likely 2/2 severe symptomatic AS. S/p AVR 4/24 c/b DUNCAN on CKD, fever and hypotension. Also UTI/pul edema/ sepsis and L adrenal nodule finding. Also orthostatic hypotension > now on Florinef. BG levels improved and now tightly controlled with some values <100s in the afternoon evening even though pt states eating fairly well now. Will adjust insulin doses to  BG goal 100 to 180s. No hypoglycemia.   Noted creat stable in 2s    Per endocrine attending Dr Burton> Adrenal nodule work up completed and pt will need to f/u once she is more stable as out pt.       Home regimen: Lantus 33 units qhs, Novolog based on sliding scale TIDAC normally 3-5 units  Has Dexcom G7

## 2024-05-28 NOTE — PROGRESS NOTE ADULT - ASSESSMENT
gi bleed  afib  heart failure  dvt    hgb is stable  cont reg diet  cbc stable  no objection to cont a/c  stool may be dark 2/2 iron  d/w patient  d/w primary

## 2024-05-28 NOTE — PROGRESS NOTE ADULT - PROBLEM SELECTOR PLAN 2
RESOLVED  Patient had maroon-colored stool on 5/24, positive for blood.  Hemodynamically stable with stable hemoglobin.    - GI outpatient Dr Hansel Luna consulted, appreciate recs  - c/w Protonix 40mg qd PO per GI  - D/c bowel regimen  - Continue Eliquis 5mg BID unless there is a drop in Hb  - Continue diet RESOLVED  Patient had maroon-colored stool on 5/24, positive for blood.  Hemodynamically stable with stable hemoglobin.    - GI outpatient Dr Hansel Luna consulted, appreciate recs  - c/w Protonix 40mg qd PO per GI  - bowel regimen held  - Continue Eliquis 5mg BID unless there is a drop in Hb (Hgb 11.9 > 9.7 since yesterday, suspect dilutional as pt with no further bleeding and HDS)  - Continue diet

## 2024-05-28 NOTE — PROGRESS NOTE ADULT - PROBLEM SELECTOR PLAN 4
Home regimen: lantus 33u qhs w/ premeal sliding scale. A1c 7.4% in 3/2024. multiple hypoglycemic episodes, DKA now resolved, anion gap closed.  Close discussions with endocrine ongoing regarding management.  Hypoglycemic in AM several times now since discharge from MICU.  Appears to have more controlled blood sugars  - endo following, appreciate recs   - dosing basal and premeal insulin per endocrine recs  - c/w low ISS  - premeal and bedtime fingersticks  - hypoglycemia protocol.

## 2024-05-29 NOTE — PROGRESS NOTE ADULT - SUBJECTIVE AND OBJECTIVE BOX
[facility]  Franklin Smith PA-C  [address] [place] [address]  [phone]      INTERVAL HPI/OVERNIGHT EVENTS:  Seen and examined  hgb stable no new events           MEDICATIONS  (STANDING):  ammonium lactate 12% Lotion 1 Application(s) Topical <User Schedule>  apixaban 5 milliGRAM(s) Oral two times a day  aspirin  chewable 81 milliGRAM(s) Oral daily  atorvastatin 80 milliGRAM(s) Oral at bedtime  calamine/zinc oxide Lotion 1 Application(s) Topical three times a day  dextrose 10% Bolus 125 milliLiter(s) IV Bolus once  dextrose 5%. 1000 milliLiter(s) (100 mL/Hr) IV Continuous <Continuous>  dextrose 5%. 1000 milliLiter(s) (50 mL/Hr) IV Continuous <Continuous>  dextrose 50% Injectable 12.5 Gram(s) IV Push once  dextrose 50% Injectable 25 Gram(s) IV Push once  droxidopa 200 milliGRAM(s) Oral three times a day  ferrous    sulfate 325 milliGRAM(s) Oral two times a day  fludroCORTISONE 0.2 milliGRAM(s) Oral daily  glucagon  Injectable 1 milliGRAM(s) IntraMuscular once  insulin glargine Injectable (LANTUS) 8 Unit(s) SubCutaneous at bedtime  insulin lispro (ADMELOG) corrective regimen sliding scale   SubCutaneous three times a day before meals  insulin lispro (ADMELOG) corrective regimen sliding scale   SubCutaneous <User Schedule>  insulin lispro Injectable (ADMELOG) 5 Unit(s) SubCutaneous three times a day before meals  levothyroxine 75 MICROGram(s) Oral daily  midodrine. 15 milliGRAM(s) Oral three times a day  Nephro-molly 1 Tablet(s) Oral daily  pantoprazole  Injectable 40 milliGRAM(s) IV Push two times a day  triamcinolone 0.1% Ointment 1 Application(s) Topical two times a day    MEDICATIONS  (PRN):  dextrose Oral Gel 15 Gram(s) Oral once PRN Blood Glucose LESS THAN 70 milliGRAM(s)/deciliter      Allergies    contrast media (iodine-based) (Fever; Vomiting; Flushing; Hypotension; Rash; Stomach Upset)  Ativan (Rash; Urticaria)  penicillins (Hives (Mod to Severe); Anaphylaxis (Mod to Severe); Short breath (Mod to Severe); Angioedema (Mod to Severe))    Intolerances                ROS:     General:  No wt loss, fevers, chills, night sweats, fatigue,   Eyes:  Good vision, no reported pain  ENT:  No sore throat, pain, runny nose, dysphagia  CV:  No pain, palpitations, hypo/hypertension  Resp:  No dyspnea, cough, tachypnea, wheezing  GI:  No pain, No nausea, No vomiting, No diarrhea, No constipation, No weight loss, No fever, No pruritis, No rectal bleeding, No tarry stools, No dysphagia,  :  No pain, bleeding, incontinence, nocturia  Muscle:  No pain, weakness  Neuro:  No weakness, tingling, memory problems  Psych:  No fatigue, insomnia, mood problems, depression  Endocrine:  No polyuria, polydipsia, cold/heat intolerance  Heme:  No petechiae, ecchymosis, easy bruisability  Skin:  No rash, tattoos, scars, edema      PHYSICAL EXAM  Vital Signs Last 24 Hrs  T(C): 36.9 (26 May 2024 04:31), Max: 36.9 (26 May 2024 04:31)  T(F): 98.5 (26 May 2024 04:31), Max: 98.5 (26 May 2024 04:31)  HR: 87 (26 May 2024 04:31) (87 - 93)  BP: 100/64 (26 May 2024 04:31) (100/64 - 102/61)  BP(mean): --  RR: 18 (26 May 2024 04:31) (18 - 18)  SpO2: 94% (26 May 2024 04:31) (94% - 96%)    Parameters below as of 26 May 2024 04:31  Patient On (Oxygen Delivery Method): room air          GENERAL:  Appears stated age  HEENT:  NC/AT  CHEST:  Full & symmetric excursion  HEART:  Regular rhythm  ABDOMEN:  Soft, non-tender, non-distended  EXTEREMITIES:  no cyanosis  SKIN:  No rash  NEURO:  Alert            LABS:                        10.7   9.58  )-----------( 178      ( 26 May 2024 07:06 )             35.9     05-26    133<L>  |  100  |  35<H>  ----------------------------<  199<H>  4.1   |  22  |  2.20<H>    Ca    8.2<L>      26 May 2024 07:06  Phos  3.6     05-26  Mg     1.9     05-26    TPro  5.6<L>  /  Alb  2.6<L>  /  TBili  0.5  /  DBili  x   /  AST  20  /  ALT  11  /  AlkPhos  79  05-26 05-25-24 @ 07:01  -  05-26-24 @ 07:00  --------------------------------------------------------  IN: 740 mL / OUT: 900 mL / NET: -160 mL            RADIOLOGY & ADDITIONAL TESTS:

## 2024-05-29 NOTE — PROGRESS NOTE ADULT - PROBLEM SELECTOR PLAN 8
Likely delayed contrast reaction vs pantoprazole. Derm biopsy consistent w/ agep. s/p nystatin and triamcinolone cream to affected debi. Improving. Now with new rash  - continue to monitor  - calamine cream, vaseline  - continue triamcinolone  - Derm re-consulted, appreciate recs

## 2024-05-29 NOTE — PROVIDER CONTACT NOTE (OTHER) - ASSESSMENT
Patient strained to void, little-no additional collection in purewick canister.  Patient adamant that she has been urinating all day.  High level of moisture in pubic/perineal area.  Pubic area swollen.

## 2024-05-29 NOTE — PROGRESS NOTE ADULT - ASSESSMENT
gi bleed  afib  heart failure  dvt    hgb is stable  cont reg diet  cbc stable  no objection to cont a/c  stool may be dark 2/2 iron  d/w patient

## 2024-05-29 NOTE — PROGRESS NOTE ADULT - PROBLEM SELECTOR PLAN 1
Likely in setting of deconditioning.  Improving with fluids.  Will attempt orthostatics daily.  Hoping to wean off midodrine in order to restart GDMT.  Orthostatics improving from supine to seated, but still problematic with standing.  Remains orthostatic and symptomatic on standing  - leg compression, difficulty with abdominal binder due to body habitus   - OOB as tolerated   - midodrine 15mg TID.  - continue droxydopa 400mg TID  - Continue fludrocortisone 0.2mg daily  - Continue to evaluate standing orthostatics daily

## 2024-05-29 NOTE — PROGRESS NOTE ADULT - ATTENDING COMMENTS
73F PMH: HFrEF (EF 30%), AS, LBBB, HTN, DM1, CKD, hypothyroidism, chronic lymphedema, LELIA (3L home O2) p/w for syncope 2/2 severe AS, s/p TAVR 4/24. Course complicated by contrast induced allergic reaction (had CTA for TAVR eval) and contrast related renal failure (acute on chronic) requiring HD. TAVR c/b VT and vfib requiring shock and flash pulmonary edema causing Acute hypoxic respiratory failure  requiring intubation.     Course complicated by contrast induced allergic reaction (had CTA for TAVR eval) and contrast related renal failure (acute on chronic) requiring CRRT for fluid removal and pressors for vasoplegia and hypovolemia due to CRRT. Patient extubated, now off pressors, CRRT.        #Orthostatic hypotension- droxydopa, midodrine with florinef. patient's orthostatics still positive. Increase the droxydopa to 400mg yesterday- awaiting todays BP read. Will also ask structural heart to reval if we are missing anything  #Urine with pus in it today- sending for UA/UCx- hardy back in place for urinary retention  # Left UE DVT- c/w eliquis.   #T1DM- uncontrolled with intermittent episodes of labile BS- . Being managed by endocrinology. Currently on Lantus and Admelog  # DUNCAN on CKD stage 4- cr stable-   # HFrEF- currently GDMT is on hold secondary to OH.    # Melena- stable- seen by GI- follow CBC-stable

## 2024-05-29 NOTE — PROGRESS NOTE ADULT - PROBLEM SELECTOR PLAN 9
TSH initially mildly elevated 4.79 - 5.06, FT4 1.3-1.5  TSH of 10.20 on 5/28  - will increase to levothyroxine 88mcg qd   - Recheck TSH and FT4 outpatient

## 2024-05-29 NOTE — PROGRESS NOTE ADULT - ASSESSMENT
Ms Alina Chowdhury is a 72 y/o F with PMHx HFrEF (EF 30%), AS, LBBB, HTN, DM1, CKD, hypothyroidism, chronic lymphedema, LELIA (3L home O2) who presented for syncope 2/2 severe AS, now s/p TAVR 4/24 c/b by VT and Vfib requiring shock and flash pulmonary edema requiring intubation. Course complicated by contrast induced allergic reaction (had CTA for TAVR eval) and contrast related renal failure (acute on chronic) requiring CRRT for fluid removal and pressors for vasoplegia and hypovolemia due to CRRT. Patient now extubated, off pressors, CRRT and downgraded to medicine floors now c/b severe orthostatic hypotension requiring midodrine, droxidopa, and fludrocortisone, as well as melena which has resolved.  Calm

## 2024-05-29 NOTE — PROGRESS NOTE ADULT - PROBLEM SELECTOR PLAN 5
DUNCAN secondary to ATN from hypotension and contrast nephropathy. CKD stage 4. s/p CRRT and bumex drip in CICU.  De [place] Garza for urinary retention (placed 5/10 and replaced ~5/17).  Improving and appears stable 2.2-2.4  - f/u nephro recs   - strict I's and O's  - renally dose meds, avoid nephrotoxic agents. DUNCAN secondary to ATN from hypotension and contrast nephropathy. CKD stage 4. s/p CRRT and bumex drip in CICU.  Hardy for urinary retention (placed 5/10 and replaced ~5/17).  Improving and appears stable 2.2-2.4  - f/u nephro recs   - strict I's and O's  - renally dose meds, avoid nephrotoxic agents.    #Urinary retention  Overnight 5/28 requiring straight cath for urine retention, required 2nd straight cath this morning. On straight cath urine noted to be cloudy and milky but pt denies all urinary sx.   -f/u UA, UCx  -hardy placed 5/29

## 2024-05-29 NOTE — PROGRESS NOTE ADULT - PROBLEM SELECTOR PLAN 2
RESOLVED  Patient had maroon-colored stool on 5/24, positive for blood.  Hemodynamically stable with stable hemoglobin.    - GI outpatient Dr Hansel Luna consulted, appreciate recs  - c/w Protonix 40mg qd PO per GI  - bowel regimen held  - Continue Eliquis 5mg BID unless there is a drop in Hb (Hgb 11.9 > 9.7 since yesterday, suspect dilutional as pt with no further bleeding and HDS)  - Continue diet RESOLVED  Patient had maroon-colored stool on 5/24, positive for blood.  Hemodynamically stable with stable hemoglobin.    - GI outpatient Dr Hansel Luna consulted, appreciate recs  - c/w Protonix 40mg qd PO per GI  - bowel regimen held  - Continue Eliquis 5mg BID unless there is a drop in Hb (Hgb stable to 9-10)  - Continue diet

## 2024-05-29 NOTE — PROGRESS NOTE ADULT - ASSESSMENT
73F w HFrEF (EF 30%), mod AS, known LBBB, HTN, IDDM1, CKD, hypothyroidism, chronic lymphedema, suspected OHS/LELIA on 3L NC home O2 p/w 1 episode of syncope. Recent admission at [facility] (3/29-4/5) for ADHF and DUNCAN s/p diuresis, discharged with new home O2 3L NC suspecting OHS/LELIA pending outpatient w/u. Since discharge a week ago, she has been staying at home with   Pt had sob walking to car. Once in car, she was still breathing heavily. Partner noticed pt was not responding to verbal stimuli for 10-15sec and witnessed R arm jerking. Pt gained consciousness quickly without postictal symptoms. Pt went to Cardiologist Dr. Nathan who recommended pt to come to ED. No fever, cp, abd pain, n/v. Leg swelling is improved from prior. Pt on torsemide 40mg which she has been taking. Pt was discharged on 3LNC which she is currently on. Patient reports she did not take Farxiga that she was discharged on as she was concerned about side effects.     In ED, patient was found afebrile, hemodynamically stable, breathing well on 3L NC. Initial labs notable for mild hyponatremia, DUNCAN on CKD, elevated proBNP and elevated pCO2 both improved from prior.  pt also noticed with abn creatinine. had severe AS had ct scan with IV contrast had contrast nephropathy and hypotension ---> CRRT required       1- CKD IV  2- CHF   3- lymphedema   4- severe AS  s/p tavr 4/24  5- hypotension orthostatic       creatinine is overall steady in this range  not in overt chf at present  hold diuretics   orthostatic hypotension,   continue midodrine 15 mg tid,  increased northera 400 mg TID   florinef increased to 0.2mg bid   continue to check orthostatic bp  also PT for conditioning   anemia, hb higher  now off epogen    non-oliguric, off Hd   strict I/O

## 2024-05-29 NOTE — PROGRESS NOTE ADULT - ASSESSMENT
73F w HFrEF (EF 30%), mod AS, known LBBB, HTN, IDDM1, CKD, hypothyroidism, chronic lymphedema, p/w 1 episode of syncope with R arm jerking.     #Syncope-Aortic Stenosis  - Known Aortic Stenosis likely Severe in setting of decreased LVEF  - s/p tavr on 4/24 c/b VT -> shock -> VFib -> shock  - hypoxic/congested, and was intubated, now extubated on 4/25 in the evening, on NC   - on 4/25 with worsening bradycardia, and now s/p TVP placement followed by AV Micra placement with removal of TVP  - Remains in Sinus Rhythm/known lbbb with intermittent   - pressors off and now on high dose midodrine, droxidopa and florinef in the setting of orthostasis. Droxidopa increased.   - Would obtain repeat limited TTE in the setting of worsening/persistent orthostatic hypotension  - to consider CRT-D in the future  - Cannot initiate GDMT due to orthostasis  - would try to mobilize as best as possible and monitor orthostatics, hopefully standing bp can be kept >100  - cont asa and statin  - If hgb remains an issue, would consider holding ASA while on Eliquis if ok with structural team    #Chronic Systolic HF (EF 30%), mod to severe AS, HTN, LBBB, Lymphedema   - Has known systolic HF and AS.    - Repeat TTE showed EF 30%, mild-mod LVH, mildly reduced RVF, mod-severe AS (.61 cmsq), mod pHTN  - On home Torsemide 20 mg daily, Eplerenone 25 mg daily, and Toprol  mg daily, all currently on hold  - On Midodrine 15mg TID for orthostatic hypotension  - Droxidopa now initiated as well for significant orthostasis  - Still orthostatic  - Unable to initiate GDMT at this time due to significant orthostatic hypotension  - initially CVVHD, then HD  - HD now on hold, as she is urinating and creatinine remains stable, though slightly higher today  - prn iv bumex  - renal fu  - Diagnosed with UE DVT and now on full loading dose of Eliquis  - PT and Bed to chair as much as possible  - will follow with you    #Orthostatic hypotension.   -Likely in setting of deconditioning.   - Will attempt orthostatics daily.  Hoping to wean off midodrine in order to restart GDMT. Orthostatics improving from supine to seated, but still problematic with standing. Remains orthostatic and symptomatic on standing

## 2024-05-29 NOTE — PROGRESS NOTE ADULT - SUBJECTIVE AND OBJECTIVE BOX
[facility] - Howard Kimble, Carlos Luong, Herman Alston  Office Number:  [phone]    Patient resting comfortably in bed in NAD.  Laying flat with no respiratory distress.  No complaints of chest pain, dyspnea, palpitations, PND, or orthopnea.  Feeling well this morning without complaints    ROS: negative unless otherwise mentioned.    Telemetry:  SR first degree    MEDICATIONS  (STANDING):  ammonium lactate 12% Lotion 1 Application(s) Topical <User Schedule>  apixaban 5 milliGRAM(s) Oral two times a day  aspirin  chewable 81 milliGRAM(s) Oral daily  atorvastatin 80 milliGRAM(s) Oral at bedtime  calamine/zinc oxide Lotion 1 Application(s) Topical three times a day  dextrose 10% Bolus 125 milliLiter(s) IV Bolus once  dextrose 5%. 1000 milliLiter(s) (100 mL/Hr) IV Continuous <Continuous>  dextrose 5%. 1000 milliLiter(s) (50 mL/Hr) IV Continuous <Continuous>  dextrose 50% Injectable 25 Gram(s) IV Push once  dextrose 50% Injectable 12.5 Gram(s) IV Push once  droxidopa 400 milliGRAM(s) Oral three times a day  ferrous    sulfate 325 milliGRAM(s) Oral two times a day  fludroCORTISONE 0.2 milliGRAM(s) Oral every 12 hours  glucagon  Injectable 1 milliGRAM(s) IntraMuscular once  insulin glargine Injectable (LANTUS) 10 Unit(s) SubCutaneous at bedtime  insulin lispro (ADMELOG) corrective regimen sliding scale   SubCutaneous <User Schedule>  insulin lispro (ADMELOG) corrective regimen sliding scale   SubCutaneous three times a day before meals  insulin lispro Injectable (ADMELOG) 5 Unit(s) SubCutaneous before lunch  insulin lispro Injectable (ADMELOG) 6 Unit(s) SubCutaneous before breakfast  insulin lispro Injectable (ADMELOG) 4 Unit(s) SubCutaneous before dinner  levothyroxine 75 MICROGram(s) Oral daily  midodrine. 15 milliGRAM(s) Oral three times a day  Nephro-molly 1 Tablet(s) Oral daily  pantoprazole    Tablet 40 milliGRAM(s) Oral every 24 hours  triamcinolone 0.1% Ointment 1 Application(s) Topical two times a day    MEDICATIONS  (PRN):  dextrose Oral Gel 15 Gram(s) Oral once PRN Blood Glucose LESS THAN 70 milliGRAM(s)/deciliter      Allergies    contrast media (iodine-based) (Fever; Vomiting; Flushing; Hypotension; Rash; Stomach Upset)  Ativan (Rash; Urticaria)  penicillins (Hives (Mod to Severe); Anaphylaxis (Mod to Severe); Short breath (Mod to Severe); Angioedema (Mod to Severe))    Intolerances        Vital Signs Last 24 Hrs  T(C): 36.8 (29 May 2024 04:53), Max: 36.8 (28 May 2024 20:08)  T(F): 98.2 (29 May 2024 04:53), Max: 98.3 (28 May 2024 20:08)  HR: 79 (29 May 2024 04:53) (68 - 79)  BP: 130/69 (29 May 2024 04:53) (130/69 - 148/75)  BP(mean): --  RR: 18 (29 May 2024 04:53) (18 - 18)  SpO2: 94% (29 May 2024 04:53) (94% - 99%)    Parameters below as of 29 May 2024 04:53  Patient On (Oxygen Delivery Method): room air        I&O's Summary    28 May 2024 07:01  -  29 May 2024 07:00  --------------------------------------------------------  IN: 340 mL / OUT: 1130 mL / NET: -790 mL        ON EXAM:    General: NAD, awake and alert, oriented x 3  HEENT: Mucous membranes are moist, anicteric  Lungs: Non-labored, breath sounds are clear bilaterally, No wheezing, rales or rhonchi  Cardiovascular: Regular, S1 and S2, 2/6 harsh systolic murmur best heard RUSB  Gastrointestinal: Bowel Sounds present, soft, nontender.   Lymph: chronic peripheral edema. No lymphadenopathy.  Skin: No rashes or ulcers    LABS: All Labs Reviewed:                        9.7    7.37  )-----------( 182      ( 29 May 2024 07:02 )             31.8                         9.7    8.09  )-----------( 173      ( 28 May 2024 06:42 )             31.5                         11.9   12.18 )-----------( 172      ( 27 May 2024 10:25 )             38.4     29 May 2024 07:02    134    |  103    |  33     ----------------------------<  163    4.8     |  19     |  2.39   28 May 2024 06:40    134    |  100    |  34     ----------------------------<  141    4.0     |  23     |  2.40   27 May 2024 10:25    136    |  101    |  34     ----------------------------<  139    4.3     |  22     |  2.17     Ca    8.3        29 May 2024 07:02  Ca    8.4        28 May 2024 06:40  Ca    8.6        27 May 2024 10:25  Phos  4.0       29 May 2024 07:02  Phos  3.8       28 May 2024 06:40  Phos  3.4       27 May 2024 10:25  Mg     2.0       29 May 2024 07:02  Mg     2.0       28 May 2024 06:40  Mg     2.0       27 May 2024 10:25    TPro  6.2    /  Alb  2.9    /  TBili  0.5    /  DBili  x      /  AST  23     /  ALT  11     /  AlkPhos  95     27 May 2024 10:25          Blood Culture:     05-28 @ 06:42  TSH: 10.20

## 2024-05-29 NOTE — PROGRESS NOTE ADULT - ASSESSMENT
73y Female with right heel DTI, left heel ulcer  - Patient seen and evaluated  - No signs of infection noted  - Wound care recs: please apply IODOSORB to left heel followed by 4x4 Gauze, and Kerlex. DAILY   - STRICT decubitus precautions, Z- FLOWS boots at all time when in bed and in chair resting.   - Patient stable for discharge from podiatry and follow up outpatient with Dr. Meza within ONE week via [phone]  - Will follow up periodically while in house, reconsult sooner if symptoms worsenses  [Follow-Up: _____] : a [unfilled] follow-up visit [FreeTextEntry1] : Right foot OM - hospital follow up

## 2024-05-29 NOTE — PROGRESS NOTE ADULT - SUBJECTIVE AND OBJECTIVE BOX
Patient is a 73y old  Female who presents with a chief complaint of Syncope (29 May 2024 13:50)       INTERVAL HPI/OVERNIGHT EVENTS:  Patient seen and evaluated at bedside.  Pt is resting comfortable in NAD.     Allergies    contrast media (iodine-based) (Fever; Vomiting; Flushing; Hypotension; Rash; Stomach Upset)  Ativan (Rash; Urticaria)  penicillins (Hives (Mod to Severe); Anaphylaxis (Mod to Severe); Short breath (Mod to Severe); Angioedema (Mod to Severe))    Intolerances        Vital Signs Last 24 Hrs  T(C): 36.8 (29 May 2024 04:53), Max: 36.8 (28 May 2024 20:08)  T(F): 98.2 (29 May 2024 04:53), Max: 98.3 (28 May 2024 20:08)  HR: 82 (29 May 2024 16:14) (68 - 82)  BP: 139/50 (29 May 2024 16:14) (130/69 - 148/75)  BP(mean): --  RR: 18 (29 May 2024 04:53) (18 - 18)  SpO2: 96% (29 May 2024 16:14) (94% - 99%)    Parameters below as of 29 May 2024 16:14  Patient On (Oxygen Delivery Method): room air        LABS:                        9.7    7.37  )-----------( 182      ( 29 May 2024 07:02 )             31.8     05-29    134<L>  |  103  |  33<H>  ----------------------------<  163<H>  4.8   |  19<L>  |  2.39<H>    Ca    8.3<L>      29 May 2024 07:02  Phos  4.0     05-29  Mg     2.0     05-29        Urinalysis Basic - ( 29 May 2024 13:17 )    Color: Dark Yellow / Appearance: Turbid / S.008 / pH: x  Gluc: x / Ketone: Trace mg/dL  / Bili: Negative / Urobili: 0.2 mg/dL   Blood: x / Protein: 100 mg/dL / Nitrite: Negative   Leuk Esterase: Large / RBC: 64 /HPF / WBC >998 /HPF   Sq Epi: x / Non Sq Epi: >36 /HPF / Bacteria: Many /HPF      CAPILLARY BLOOD GLUCOSE      POCT Blood Glucose.: 206 mg/dL (29 May 2024 12:43)  POCT Blood Glucose.: 193 mg/dL (29 May 2024 09:00)  POCT Blood Glucose.: 182 mg/dL (29 May 2024 02:22)  POCT Blood Glucose.: 150 mg/dL (28 May 2024 21:51)  POCT Blood Glucose.: 199 mg/dL (28 May 2024 17:21)      Lower Extremity Physical Exam:  Vascular: DP/PT 0/4, B/L, CFT < 3seconds B/L, Temperature gradient WNL, B/L, edema bilat LE.   Neuro: Epicritic sensation intact to the level of forefoot, B/L.  Musculoskeletal/Ortho: 3+ pitting edema globally to bilateral foot  Skin: L foot heel pressure ulcer to subQ with no acute signs of infection 0.7x0.7x0.1 cm in size R foot heel DTI, no skin breakdown.

## 2024-05-29 NOTE — PROGRESS NOTE ADULT - SUBJECTIVE AND OBJECTIVE BOX
Marcial Erickson MD  PGY 1 Department of Internal Medicine        Patient is a 73y old  Female who presents with a chief complaint of Syncope (28 May 2024 21:59)      SUBJECTIVE / OVERNIGHT EVENTS: Pt seen and examined. No acute overnight events. Denies fevers, chills, CP, SOB, Abdominal pain, N/V, Constipation, Diarrhea        MEDICATIONS  (STANDING):  ammonium lactate 12% Lotion 1 Application(s) Topical <User Schedule>  apixaban 5 milliGRAM(s) Oral two times a day  aspirin  chewable 81 milliGRAM(s) Oral daily  atorvastatin 80 milliGRAM(s) Oral at bedtime  calamine/zinc oxide Lotion 1 Application(s) Topical three times a day  dextrose 10% Bolus 125 milliLiter(s) IV Bolus once  dextrose 5%. 1000 milliLiter(s) (50 mL/Hr) IV Continuous <Continuous>  dextrose 5%. 1000 milliLiter(s) (100 mL/Hr) IV Continuous <Continuous>  dextrose 50% Injectable 25 Gram(s) IV Push once  dextrose 50% Injectable 12.5 Gram(s) IV Push once  droxidopa 400 milliGRAM(s) Oral three times a day  ferrous    sulfate 325 milliGRAM(s) Oral two times a day  fludroCORTISONE 0.2 milliGRAM(s) Oral every 12 hours  glucagon  Injectable 1 milliGRAM(s) IntraMuscular once  insulin glargine Injectable (LANTUS) 10 Unit(s) SubCutaneous at bedtime  insulin lispro (ADMELOG) corrective regimen sliding scale   SubCutaneous <User Schedule>  insulin lispro (ADMELOG) corrective regimen sliding scale   SubCutaneous three times a day before meals  insulin lispro Injectable (ADMELOG) 5 Unit(s) SubCutaneous before lunch  insulin lispro Injectable (ADMELOG) 6 Unit(s) SubCutaneous before breakfast  insulin lispro Injectable (ADMELOG) 4 Unit(s) SubCutaneous before dinner  levothyroxine 75 MICROGram(s) Oral daily  midodrine. 15 milliGRAM(s) Oral three times a day  Nephro-molly 1 Tablet(s) Oral daily  pantoprazole    Tablet 40 milliGRAM(s) Oral every 24 hours  triamcinolone 0.1% Ointment 1 Application(s) Topical two times a day    MEDICATIONS  (PRN):  dextrose Oral Gel 15 Gram(s) Oral once PRN Blood Glucose LESS THAN 70 milliGRAM(s)/deciliter      I&O's Summary    28 May 2024 07:01  -  29 May 2024 07:00  --------------------------------------------------------  IN: 340 mL / OUT: 1130 mL / NET: -790 mL        Vital Signs Last 24 Hrs  T(C): 36.8 (29 May 2024 04:53), Max: 36.8 (28 May 2024 20:08)  T(F): 98.2 (29 May 2024 04:53), Max: 98.3 (28 May 2024 20:08)  HR: 79 (29 May 2024 04:53) (68 - 79)  BP: 130/69 (29 May 2024 04:53) (130/69 - 148/75)  BP(mean): --  RR: 18 (29 May 2024 04:53) (18 - 18)  SpO2: 94% (29 May 2024 04:53) (94% - 99%)    Parameters below as of 29 May 2024 04:53  Patient On (Oxygen Delivery Method): room air        CAPILLARY BLOOD GLUCOSE      POCT Blood Glucose.: 182 mg/dL (29 May 2024 02:22)  POCT Blood Glucose.: 150 mg/dL (28 May 2024 21:51)  POCT Blood Glucose.: 199 mg/dL (28 May 2024 17:21)  POCT Blood Glucose.: 142 mg/dL (28 May 2024 12:43)  POCT Blood Glucose.: 156 mg/dL (28 May 2024 08:45)      PHYSICAL EXAM:  GENERAL: NAD  HEAD:  Atraumatic, Normocephalic  EYES: EOMI, conjunctiva and sclera clear  ENMT: Moist mucous membranes  CHEST/LUNG: Clear to auscultation bilaterally; No rales, rhonchi, wheezing, or rubs  HEART: Regular rate and rhythm, systolic murmur  ABDOMEN: Soft, Nontender, Nondistended; Bowel sounds present  EXTREMITIES:  2+ Peripheral Pulses, 1-2+ b/l edema up to thighs in ace wrap  SKIN: erythematous, macular rash to anterior chest wall, neck, and upper extremities improving  NERVOUS SYSTEM:  Alert, no focal deficits  PSYCH:  Appropriate affect       LABS:                        9.7    8.09  )-----------( 173      ( 28 May 2024 06:42 )             31.5     Auto Eosinophil # x     / Auto Eosinophil % x     / Auto Neutrophil # x     / Auto Neutrophil % x     / BANDS % x                            11.9   12.18 )-----------( 172      ( 27 May 2024 10:25 )             38.4     Auto Eosinophil # 1.47  / Auto Eosinophil % 12.1  / Auto Neutrophil # 8.80  / Auto Neutrophil % 72.2  / BANDS % x        05-28    134<L>  |  100  |  34<H>  ----------------------------<  141<H>  4.0   |  23  |  2.40<H>  05-27    136  |  101  |  34<H>  ----------------------------<  139<H>  4.3   |  22  |  2.17<H>    Ca    8.4      28 May 2024 06:40  Mg     2.0     05-28  Phos  3.8     05-28  TPro  6.2  /  Alb  2.9<L>  /  TBili  0.5  /  DBili  x   /  AST  23  /  ALT  11  /  AlkPhos  95  05-27          Urinalysis Basic - ( 28 May 2024 06:40 )    Color: x / Appearance: x / SG: x / pH: x  Gluc: 141 mg/dL / Ketone: x  / Bili: x / Urobili: x   Blood: x / Protein: x / Nitrite: x   Leuk Esterase: x / RBC: x / WBC x   Sq Epi: x / Non Sq Epi: x / Bacteria: x            RADIOLOGY & ADDITIONAL TESTS:    Imaging Personally Reviewed:    Consultant(s) Notes Reviewed:      Care Discussed with Consultants/Other Providers:   Marcial Erickson MD  PGY 1 Department of Internal Medicine        Patient is a 73y old  Female who presents with a chief complaint of Syncope (28 May 2024 21:59)      SUBJECTIVE / OVERNIGHT EVENTS: Pt seen and examined. Overnight with urinary retention requiring straight cath otherwise no events. Denies fevers, chills, CP, SOB, Abdominal pain, N/V, Constipation, Diarrhea        MEDICATIONS  (STANDING):  ammonium lactate 12% Lotion 1 Application(s) Topical <User Schedule>  apixaban 5 milliGRAM(s) Oral two times a day  aspirin  chewable 81 milliGRAM(s) Oral daily  atorvastatin 80 milliGRAM(s) Oral at bedtime  calamine/zinc oxide Lotion 1 Application(s) Topical three times a day  dextrose 10% Bolus 125 milliLiter(s) IV Bolus once  dextrose 5%. 1000 milliLiter(s) (50 mL/Hr) IV Continuous <Continuous>  dextrose 5%. 1000 milliLiter(s) (100 mL/Hr) IV Continuous <Continuous>  dextrose 50% Injectable 25 Gram(s) IV Push once  dextrose 50% Injectable 12.5 Gram(s) IV Push once  droxidopa 400 milliGRAM(s) Oral three times a day  ferrous    sulfate 325 milliGRAM(s) Oral two times a day  fludroCORTISONE 0.2 milliGRAM(s) Oral every 12 hours  glucagon  Injectable 1 milliGRAM(s) IntraMuscular once  insulin glargine Injectable (LANTUS) 10 Unit(s) SubCutaneous at bedtime  insulin lispro (ADMELOG) corrective regimen sliding scale   SubCutaneous <User Schedule>  insulin lispro (ADMELOG) corrective regimen sliding scale   SubCutaneous three times a day before meals  insulin lispro Injectable (ADMELOG) 5 Unit(s) SubCutaneous before lunch  insulin lispro Injectable (ADMELOG) 6 Unit(s) SubCutaneous before breakfast  insulin lispro Injectable (ADMELOG) 4 Unit(s) SubCutaneous before dinner  levothyroxine 75 MICROGram(s) Oral daily  midodrine. 15 milliGRAM(s) Oral three times a day  Nephro-molly 1 Tablet(s) Oral daily  pantoprazole    Tablet 40 milliGRAM(s) Oral every 24 hours  triamcinolone 0.1% Ointment 1 Application(s) Topical two times a day    MEDICATIONS  (PRN):  dextrose Oral Gel 15 Gram(s) Oral once PRN Blood Glucose LESS THAN 70 milliGRAM(s)/deciliter      I&O's Summary    28 May 2024 07:01  -  29 May 2024 07:00  --------------------------------------------------------  IN: 340 mL / OUT: 1130 mL / NET: -790 mL        Vital Signs Last 24 Hrs  T(C): 36.8 (29 May 2024 04:53), Max: 36.8 (28 May 2024 20:08)  T(F): 98.2 (29 May 2024 04:53), Max: 98.3 (28 May 2024 20:08)  HR: 79 (29 May 2024 04:53) (68 - 79)  BP: 130/69 (29 May 2024 04:53) (130/69 - 148/75)  BP(mean): --  RR: 18 (29 May 2024 04:53) (18 - 18)  SpO2: 94% (29 May 2024 04:53) (94% - 99%)    Parameters below as of 29 May 2024 04:53  Patient On (Oxygen Delivery Method): room air        CAPILLARY BLOOD GLUCOSE      POCT Blood Glucose.: 182 mg/dL (29 May 2024 02:22)  POCT Blood Glucose.: 150 mg/dL (28 May 2024 21:51)  POCT Blood Glucose.: 199 mg/dL (28 May 2024 17:21)  POCT Blood Glucose.: 142 mg/dL (28 May 2024 12:43)  POCT Blood Glucose.: 156 mg/dL (28 May 2024 08:45)      PHYSICAL EXAM:  GENERAL: NAD  HEAD:  Atraumatic, Normocephalic  EYES: EOMI, conjunctiva and sclera clear  ENMT: Moist mucous membranes  CHEST/LUNG: Clear to auscultation bilaterally; No rales, rhonchi, wheezing, or rubs  HEART: Regular rate and rhythm, systolic murmur  ABDOMEN: Soft, Nontender, Nondistended; Bowel sounds present  EXTREMITIES:  2+ Peripheral Pulses, 1-2+ b/l edema up to thighs in ace wrap  SKIN: erythematous, macular rash to anterior chest wall, neck, and upper extremities improving  NERVOUS SYSTEM:  Alert, no focal deficits  PSYCH:  Appropriate affect       LABS:                        9.7    8.09  )-----------( 173      ( 28 May 2024 06:42 )             31.5     Auto Eosinophil # x     / Auto Eosinophil % x     / Auto Neutrophil # x     / Auto Neutrophil % x     / BANDS % x                            11.9   12.18 )-----------( 172      ( 27 May 2024 10:25 )             38.4     Auto Eosinophil # 1.47  / Auto Eosinophil % 12.1  / Auto Neutrophil # 8.80  / Auto Neutrophil % 72.2  / BANDS % x        05-28    134<L>  |  100  |  34<H>  ----------------------------<  141<H>  4.0   |  23  |  2.40<H>  05-27    136  |  101  |  34<H>  ----------------------------<  139<H>  4.3   |  22  |  2.17<H>    Ca    8.4      28 May 2024 06:40  Mg     2.0     05-28  Phos  3.8     05-28  TPro  6.2  /  Alb  2.9<L>  /  TBili  0.5  /  DBili  x   /  AST  23  /  ALT  11  /  AlkPhos  95  05-27          Urinalysis Basic - ( 28 May 2024 06:40 )    Color: x / Appearance: x / SG: x / pH: x  Gluc: 141 mg/dL / Ketone: x  / Bili: x / Urobili: x   Blood: x / Protein: x / Nitrite: x   Leuk Esterase: x / RBC: x / WBC x   Sq Epi: x / Non Sq Epi: x / Bacteria: x            RADIOLOGY & ADDITIONAL TESTS:    Imaging Personally Reviewed:    Consultant(s) Notes Reviewed:      Care Discussed with Consultants/Other Providers:

## 2024-05-29 NOTE — PROGRESS NOTE ADULT - SUBJECTIVE AND OBJECTIVE BOX
[facility]   [phone]  RENAL FOLLOW UP NOTE  --------------------------------------------------------------------------------  Chief Complaint:    24 hour events/subjective:    seen earlier   + dizziness when stands up     PAST HISTORY  --------------------------------------------------------------------------------  No significant changes to PMH, PSH, FHx, SHx, unless otherwise noted    ALLERGIES & MEDICATIONS  --------------------------------------------------------------------------------  Allergies    contrast media (iodine-based) (Fever; Vomiting; Flushing; Hypotension; Rash; Stomach Upset)  Ativan (Rash; Urticaria)  penicillins (Hives (Mod to Severe); Anaphylaxis (Mod to Severe); Short breath (Mod to Severe); Angioedema (Mod to Severe))    Intolerances      Standing Inpatient Medications  ammonium lactate 12% Lotion 1 Application(s) Topical <User Schedule>  apixaban 5 milliGRAM(s) Oral two times a day  aspirin  chewable 81 milliGRAM(s) Oral daily  atorvastatin 80 milliGRAM(s) Oral at bedtime  calamine/zinc oxide Lotion 1 Application(s) Topical three times a day  cefTRIAXone   IVPB 1000 milliGRAM(s) IV Intermittent every 24 hours  dextrose 10% Bolus 125 milliLiter(s) IV Bolus once  dextrose 5%. 1000 milliLiter(s) IV Continuous <Continuous>  dextrose 5%. 1000 milliLiter(s) IV Continuous <Continuous>  dextrose 50% Injectable 25 Gram(s) IV Push once  dextrose 50% Injectable 12.5 Gram(s) IV Push once  droxidopa 400 milliGRAM(s) Oral three times a day  ferrous    sulfate 325 milliGRAM(s) Oral two times a day  fludroCORTISONE 0.2 milliGRAM(s) Oral every 12 hours  glucagon  Injectable 1 milliGRAM(s) IntraMuscular once  insulin glargine Injectable (LANTUS) 10 Unit(s) SubCutaneous at bedtime  insulin lispro (ADMELOG) corrective regimen sliding scale   SubCutaneous <User Schedule>  insulin lispro (ADMELOG) corrective regimen sliding scale   SubCutaneous three times a day before meals  insulin lispro Injectable (ADMELOG) 5 Unit(s) SubCutaneous before lunch  insulin lispro Injectable (ADMELOG) 6 Unit(s) SubCutaneous before breakfast  insulin lispro Injectable (ADMELOG) 4 Unit(s) SubCutaneous before dinner  levothyroxine 75 MICROGram(s) Oral daily  midodrine. 15 milliGRAM(s) Oral three times a day  Nephro-molly 1 Tablet(s) Oral daily  pantoprazole    Tablet 40 milliGRAM(s) Oral every 24 hours  triamcinolone 0.1% Ointment 1 Application(s) Topical two times a day    PRN Inpatient Medications  dextrose Oral Gel 15 Gram(s) Oral once PRN      REVIEW OF SYSTEMS  --------------------------------------------------------------------------------    Gen: denies  fevers/chills,  CVS: denies chest pain/palpitations  Resp: denies SOB/Cough  GI: Denies N/V/Abd pain  : Denies dysuria    VITALS/PHYSICAL EXAM  --------------------------------------------------------------------------------  T(C): 36.8 (05-29-24 @ 04:53), Max: 36.8 (05-28-24 @ 20:08)  HR: 87 (05-29-24 @ 18:32) (72 - 87)  BP: 139/50 (05-29-24 @ 16:14) (130/69 - 148/75)  RR: 18 (05-29-24 @ 04:53) (18 - 18)  SpO2: 96% (05-29-24 @ 16:14) (94% - 99%)  Wt(kg): --        05-28-24 @ 07:01  -  05-29-24 @ 07:00  --------------------------------------------------------  IN: 340 mL / OUT: 1130 mL / NET: -790 mL      Physical Exam:  	             Gen: comfortable appearing   	Pulm: decrease bs  no rales or ronchi or wheezing  	CV: No JVD. RRR, S1S2; no rub II/VI M   	Abd: +BS, soft, nontender/nondistended, obese   	UE: Warm, no cyanosis  no clubbing, no edema  	LE: Warm, no cyanosis  no clubbing, 2 -  edema,  	Neuro: alert and oriented       LABS/STUDIES  --------------------------------------------------------------------------------              9.7    7.37  >-----------<  182      [05-29-24 @ 07:02]              31.8     134  |  103  |  33  ----------------------------<  163      [05-29-24 @ 07:02]  4.8   |  19  |  2.39        Ca     8.3     [05-29-24 @ 07:02]      Mg     2.0     [05-29-24 @ 07:02]      Phos  4.0     [05-29-24 @ 07:02]            Creatinine Trend:  SCr 2.39 [05-29 @ 07:02]  SCr 2.40 [05-28 @ 06:40]  SCr 2.17 [05-27 @ 10:25]  SCr 2.20 [05-26 @ 07:06]  SCr 2.00 [05-25 @ 07:16]              Urinalysis - [05-29-24 @ 13:17]      Color Dark Yellow / Appearance Turbid / SG 1.008 / pH 5.5      Gluc Negative / Ketone Trace  / Bili Negative / Urobili 0.2       Blood Large / Protein 100 / Leuk Est Large / Nitrite Negative      RBC 64 / WBC >998 / Hyaline  / Gran  / Sq Epi  / Non Sq Epi >36 / Bacteria Many    Urinalysis - [05-29-24 @ 13:17]      Color Dark Yellow / Appearance Turbid / SG 1.008 / pH 5.5      Gluc Negative / Ketone Trace  / Bili Negative / Urobili 0.2       Blood Large / Protein 100 / Leuk Est Large / Nitrite Negative      RBC 64 / WBC >998 / Hyaline  / Gran  / Sq Epi  / Non Sq Epi >36 / Bacteria Many      PTH -- (Ca 8.4)      [04-19-24 @ 17:54]   109  TSH 10.20      [05-28-24 @ 06:42]  Lipid: chol 74, TG 70, HDL 33, LDL --      [04-13-24 @ 07:05]

## 2024-05-30 NOTE — PROGRESS NOTE ADULT - PROBLEM SELECTOR PLAN 12
DVT ppx: Eliquis  Diet: CC   Dispo: pending clinical course, PT rec [facility]    AVOID QT PROLONGING AGENTS - QTc 541 5/30/24 DVT ppx: Eliquis  Diet: CC   Dispo: pending clinical course, PT rec [facility]. PM&R recommend ACUTE inpatient rehabilitation     AVOID QT PROLONGING AGENTS - QTc 541 5/30/24

## 2024-05-30 NOTE — PROGRESS NOTE ADULT - ASSESSMENT
gi bleed  afib  heart failure  dvt    hgb is stable  cont reg diet  cbc stable  no objection to cont a/c  d/w patient

## 2024-05-30 NOTE — PROGRESS NOTE ADULT - SUBJECTIVE AND OBJECTIVE BOX
[facility]  Franklin Smith PA-C  [address] [place] [address]  [phone]      INTERVAL HPI/OVERNIGHT EVENTS:  Seen and examined  hgb stable no new events           MEDICATIONS  (STANDING):  ammonium lactate 12% Lotion 1 Application(s) Topical <User Schedule>  apixaban 5 milliGRAM(s) Oral two times a day  aspirin  chewable 81 milliGRAM(s) Oral daily  atorvastatin 80 milliGRAM(s) Oral at bedtime  calamine/zinc oxide Lotion 1 Application(s) Topical three times a day  dextrose 10% Bolus 125 milliLiter(s) IV Bolus once  dextrose 5%. 1000 milliLiter(s) (100 mL/Hr) IV Continuous <Continuous>  dextrose 5%. 1000 milliLiter(s) (50 mL/Hr) IV Continuous <Continuous>  dextrose 50% Injectable 12.5 Gram(s) IV Push once  dextrose 50% Injectable 25 Gram(s) IV Push once  droxidopa 200 milliGRAM(s) Oral three times a day  ferrous    sulfate 325 milliGRAM(s) Oral two times a day  fludroCORTISONE 0.2 milliGRAM(s) Oral daily  glucagon  Injectable 1 milliGRAM(s) IntraMuscular once  insulin glargine Injectable (LANTUS) 8 Unit(s) SubCutaneous at bedtime  insulin lispro (ADMELOG) corrective regimen sliding scale   SubCutaneous three times a day before meals  insulin lispro (ADMELOG) corrective regimen sliding scale   SubCutaneous <User Schedule>  insulin lispro Injectable (ADMELOG) 5 Unit(s) SubCutaneous three times a day before meals  levothyroxine 75 MICROGram(s) Oral daily  midodrine. 15 milliGRAM(s) Oral three times a day  Nephro-molly 1 Tablet(s) Oral daily  pantoprazole  Injectable 40 milliGRAM(s) IV Push two times a day  triamcinolone 0.1% Ointment 1 Application(s) Topical two times a day    MEDICATIONS  (PRN):  dextrose Oral Gel 15 Gram(s) Oral once PRN Blood Glucose LESS THAN 70 milliGRAM(s)/deciliter      Allergies    contrast media (iodine-based) (Fever; Vomiting; Flushing; Hypotension; Rash; Stomach Upset)  Ativan (Rash; Urticaria)  penicillins (Hives (Mod to Severe); Anaphylaxis (Mod to Severe); Short breath (Mod to Severe); Angioedema (Mod to Severe))    Intolerances                ROS:     General:  No wt loss, fevers, chills, night sweats, fatigue,   Eyes:  Good vision, no reported pain  ENT:  No sore throat, pain, runny nose, dysphagia  CV:  No pain, palpitations, hypo/hypertension  Resp:  No dyspnea, cough, tachypnea, wheezing  GI:  No pain, No nausea, No vomiting, No diarrhea, No constipation, No weight loss, No fever, No pruritis, No rectal bleeding, No tarry stools, No dysphagia,  :  No pain, bleeding, incontinence, nocturia  Muscle:  No pain, weakness  Neuro:  No weakness, tingling, memory problems  Psych:  No fatigue, insomnia, mood problems, depression  Endocrine:  No polyuria, polydipsia, cold/heat intolerance  Heme:  No petechiae, ecchymosis, easy bruisability  Skin:  No rash, tattoos, scars, edema      PHYSICAL EXAM  Vital Signs Last 24 Hrs  T(C): 36.9 (26 May 2024 04:31), Max: 36.9 (26 May 2024 04:31)  T(F): 98.5 (26 May 2024 04:31), Max: 98.5 (26 May 2024 04:31)  HR: 87 (26 May 2024 04:31) (87 - 93)  BP: 100/64 (26 May 2024 04:31) (100/64 - 102/61)  BP(mean): --  RR: 18 (26 May 2024 04:31) (18 - 18)  SpO2: 94% (26 May 2024 04:31) (94% - 96%)    Parameters below as of 26 May 2024 04:31  Patient On (Oxygen Delivery Method): room air          GENERAL:  Appears stated age  HEENT:  NC/AT  CHEST:  Full & symmetric excursion  HEART:  Regular rhythm  ABDOMEN:  Soft, non-tender, non-distended  EXTEREMITIES:  no cyanosis  SKIN:  No rash  NEURO:  Alert            LABS:                        10.7   9.58  )-----------( 178      ( 26 May 2024 07:06 )             35.9     05-26    133<L>  |  100  |  35<H>  ----------------------------<  199<H>  4.1   |  22  |  2.20<H>    Ca    8.2<L>      26 May 2024 07:06  Phos  3.6     05-26  Mg     1.9     05-26    TPro  5.6<L>  /  Alb  2.6<L>  /  TBili  0.5  /  DBili  x   /  AST  20  /  ALT  11  /  AlkPhos  79  05-26 05-25-24 @ 07:01  -  05-26-24 @ 07:00  --------------------------------------------------------  IN: 740 mL / OUT: 900 mL / NET: -160 mL            RADIOLOGY & ADDITIONAL TESTS:

## 2024-05-30 NOTE — PROGRESS NOTE ADULT - SUBJECTIVE AND OBJECTIVE BOX
[facility]   [phone]  RENAL FOLLOW UP NOTE  --------------------------------------------------------------------------------  Chief Complaint:    24 hour events/subjective:    patient seen and examined.   nausea overnight  decreased po intake    PAST HISTORY  --------------------------------------------------------------------------------  No significant changes to PMH, PSH, FHx, SHx, unless otherwise noted    ALLERGIES & MEDICATIONS  --------------------------------------------------------------------------------  Allergies    contrast media (iodine-based) (Fever; Vomiting; Flushing; Hypotension; Rash; Stomach Upset)  Ativan (Rash; Urticaria)  penicillins (Hives (Mod to Severe); Anaphylaxis (Mod to Severe); Short breath (Mod to Severe); Angioedema (Mod to Severe))    Intolerances      Standing Inpatient Medications  ammonium lactate 12% Lotion 1 Application(s) Topical <User Schedule>  apixaban 5 milliGRAM(s) Oral two times a day  aspirin  chewable 81 milliGRAM(s) Oral daily  atorvastatin 80 milliGRAM(s) Oral at bedtime  calamine/zinc oxide Lotion 1 Application(s) Topical three times a day  cefTRIAXone   IVPB 1000 milliGRAM(s) IV Intermittent every 24 hours  dextrose 10% Bolus 125 milliLiter(s) IV Bolus once  dextrose 5%. 1000 milliLiter(s) IV Continuous <Continuous>  dextrose 5%. 1000 milliLiter(s) IV Continuous <Continuous>  dextrose 50% Injectable 12.5 Gram(s) IV Push once  dextrose 50% Injectable 25 Gram(s) IV Push once  droxidopa 400 milliGRAM(s) Oral three times a day  ferrous    sulfate 325 milliGRAM(s) Oral two times a day  fludroCORTISONE 0.2 milliGRAM(s) Oral every 12 hours  glucagon  Injectable 1 milliGRAM(s) IntraMuscular once  insulin glargine Injectable (LANTUS) 10 Unit(s) SubCutaneous at bedtime  insulin lispro (ADMELOG) corrective regimen sliding scale   SubCutaneous <User Schedule>  insulin lispro (ADMELOG) corrective regimen sliding scale   SubCutaneous three times a day before meals  insulin lispro Injectable (ADMELOG) 5 Unit(s) SubCutaneous before lunch  insulin lispro Injectable (ADMELOG) 6 Unit(s) SubCutaneous before breakfast  insulin lispro Injectable (ADMELOG) 4 Unit(s) SubCutaneous before dinner  levothyroxine 75 MICROGram(s) Oral daily  midodrine. 15 milliGRAM(s) Oral three times a day  Nephro-molly 1 Tablet(s) Oral daily  pantoprazole    Tablet 40 milliGRAM(s) Oral every 24 hours  triamcinolone 0.1% Ointment 1 Application(s) Topical two times a day    PRN Inpatient Medications  dextrose Oral Gel 15 Gram(s) Oral once PRN      REVIEW OF SYSTEMS  --------------------------------------------------------------------------------    Gen: denies fevers/chills,  CVS: denies chest pain/palpitations  Resp: denies SOB/Cough  GI: Denies N/V/Abd pain  : Denies dysuria    VITALS/PHYSICAL EXAM  --------------------------------------------------------------------------------  T(C): 36.9 (05-30-24 @ 11:42), Max: 37.3 (05-30-24 @ 04:07)  HR: 76 (05-30-24 @ 11:42) (76 - 99)  BP: 108/63 (05-30-24 @ 11:42) (100/54 - 139/50)  RR: 18 (05-30-24 @ 11:42) (18 - 18)  SpO2: 96% (05-30-24 @ 11:42) (93% - 96%)  Wt(kg): --        05-29-24 @ 07:01  -  05-30-24 @ 07:00  --------------------------------------------------------  IN: 300 mL / OUT: 1400 mL / NET: -1100 mL      Physical Exam:  	              Gen: comfortable appearing   	Pulm: decrease bs  no rales or ronchi or wheezing  	CV: No JVD. RRR, S1S2; no rub II/VI M   	Abd: +BS, soft, nontender/nondistended, obese   	UE: Warm, no cyanosis  no clubbing, no edema  	LE: Warm, no cyanosis  no clubbing, 2 -  edema, B/L ACE bandage  	Neuro: alert and oriented       LABS/STUDIES  --------------------------------------------------------------------------------              10.5   9.32  >-----------<  184      [05-30-24 @ 06:51]              35.6     135  |  100  |  39  ----------------------------<  275      [05-30-24 @ 06:51]  4.8   |  17  |  2.50        Ca     8.4     [05-30-24 @ 06:51]      Mg     2.0     [05-30-24 @ 06:51]      Phos  3.5     [05-30-24 @ 06:51]            Creatinine Trend:  SCr 2.50 [05-30 @ 06:51]  SCr 2.39 [05-29 @ 07:02]  SCr 2.40 [05-28 @ 06:40]  SCr 2.17 [05-27 @ 10:25]  SCr 2.20 [05-26 @ 07:06]            PTH -- (Ca 8.4)      [04-19-24 @ 17:54]   109  TSH 10.20      [05-28-24 @ 06:42]  Lipid: chol 74, TG 70, HDL 33, LDL --      [04-13-24 @ 07:05]

## 2024-05-30 NOTE — PROGRESS NOTE ADULT - PROBLEM SELECTOR PLAN 3
#GI BLEED (RESOLVED)  Patient had maroon-colored stool on 5/24, positive for blood.  Hemodynamically stable with stable hemoglobin.    - GI outpatient Dr Hansel Luna consulted, appreciate recs  - c/w Protonix 40mg qd PO per GI  - bowel regimen held  - Continue Eliquis 5mg BID unless there is a drop in Hb (Hgb stable to 9-10)  - Continue diet

## 2024-05-30 NOTE — PROGRESS NOTE ADULT - PROBLEM SELECTOR PLAN 1
- Test BG ac and hs/2am  - Increase Lantus to 12 units at hs.   - Continue Admelog dose to 6-5-4 units TIDAC.  Pt to inform staff if not eating or not eating enough  - C/w Low dose admelog correction scale TIDAC, Low dose admelog correction scale QHS and 2am in case of rebound hypo/hyperglycemia  Discharge:  Will be determined according to BG/insulin needs/PO intake  at time of discharge. Basal/bolus insulin doses TBD  - Of note, patient instructed on discharge from recent hospitalization at [place] to start farxiga for CHF. Given risks of DKA of SGLT2i in T1DM, would not start until better data is available for its benefits.  - Follow up with Dr. Luis Dumont outpatient  -Pt will likely required rehab  F/u with optho/renal/cardiac as out pt

## 2024-05-30 NOTE — PROGRESS NOTE ADULT - PROBLEM SELECTOR PLAN 7
EF from 4/2024 (post-TAVR) w/ EF 25%, global LV hypokinesis, moderately dilated LV, grade 2 LV diastolic dysfunction, RA moderately dilated.  Repeat TTE with reduced LV function.  TAVR remains well seated  - cards following, will f/u with cardiology regarding resuming GDMT  - continue midodrine 15mg TID   - Continue atorvastatin 80mg daily  - hold home torsemide 20mg qd, hold home eplerenone 25mg qd, hold home metoprolol succinate 100mg qd  - f/u repeat limited TTE  - f/u w/ EP outpatient for CRT.    #Aortic Stenosis  Presented for syncope secondary to severe AS, s/p TAVR on 4/24 c/b by VT -> shock -> VFib -> shock.  Course complicated w/ symptomatic bradycardia, requiring TVP, now s/p micra PPM. TAVR well seated on recent TTE without regurgitation  - continue aspirin 81mg daily.

## 2024-05-30 NOTE — PROGRESS NOTE ADULT - NUTRITIONAL ASSESSMENT
Diet, Consistent Carbohydrate w/Evening Snack:   Kwesi(7 Gm Arginine/7 Gm Glut/1.2 Gm HMB     Qty per Day:  2 (05-28-24 @ 15:11) [Active]

## 2024-05-30 NOTE — PROGRESS NOTE ADULT - ASSESSMENT
73F w HFrEF (EF 30%), mod AS, known LBBB, HTN, IDDM1, CKD, hypothyroidism, chronic lymphedema, suspected OHS/LELIA on 3L NC home O2 p/w 1 episode of syncope. Recent admission at [facility] (3/29-4/5) for ADHF and DUNCAN s/p diuresis, discharged with new home O2 3L NC suspecting OHS/LELIA pending outpatient w/u. Since discharge a week ago, she has been staying at home with   Pt had sob walking to car. Once in car, she was still breathing heavily. Partner noticed pt was not responding to verbal stimuli for 10-15sec and witnessed R arm jerking. Pt gained consciousness quickly without postictal symptoms. Pt went to Cardiologist Dr. Nathan who recommended pt to come to ED. No fever, cp, abd pain, n/v. Leg swelling is improved from prior. Pt on torsemide 40mg which she has been taking. Pt was discharged on 3LNC which she is currently on. Patient reports she did not take Farxiga that she was discharged on as she was concerned about side effects.     In ED, patient was found afebrile, hemodynamically stable, breathing well on 3L NC. Initial labs notable for mild hyponatremia, DUNCAN on CKD, elevated proBNP and elevated pCO2 both improved from prior.  pt also noticed with abn creatinine. had severe AS had ct scan with IV contrast had contrast nephropathy and hypotension ---> CRRT required       1- CKD IV  2- CHF   3- lymphedema   4- severe AS  s/p tavr 4/24  5- hypotension orthostatic       creatinine is overall steady in this range  not in overt chf at present  hold diuretics   orthostatic hypotension,   continue midodrine 15 mg tid,  northera 400 mg TID   florinef 0.2mg bid   continue to check orthostatic bp  also PT for conditioning   anemia, hb higher  now off epogen    non-oliguric,   hardy re inserted for urinary retention  +U/A, cx pending, started ceftriaxone 1G daily x 7days,  5/29  strict I/O

## 2024-05-30 NOTE — PROGRESS NOTE ADULT - PROBLEM SELECTOR PLAN 9
TSH initially mildly elevated 4.79 - 5.06, FT4 1.3-1.5  TSH of 10.20 on 5/28, repeat T4 wnl  - will increase to levothyroxine 88mcg qd   - Recheck TSH and FT4 outpatient

## 2024-05-30 NOTE — PROGRESS NOTE ADULT - PROBLEM SELECTOR PLAN 11
on 3L NC at home. Not on CPAP. Hospital course complicated w/ AHRF 2/2 to flash pulm edema requiring intubation. Now extubated and stable on RA  - continue to monitor.

## 2024-05-30 NOTE — PROGRESS NOTE ADULT - ASSESSMENT
73F w HFrEF (EF 30%), mod AS, known LBBB, HTN, IDDM1, CKD, hypothyroidism, chronic lymphedema, p/w 1 episode of syncope with R arm jerking.     #Syncope-Aortic Stenosis  - Known Aortic Stenosis likely Severe in setting of decreased LVEF  - s/p tavr on 4/24 c/b VT -> shock -> VFib -> shock  - hypoxic/congested, and was intubated, now extubated on 4/25 in the evening, on NC   - on 4/25 with worsening bradycardia, and now s/p TVP placement followed by AV Micra placement with removal of TVP  - Remains in Sinus Rhythm/known lbbb with intermittent   - pressors off and now on high dose midodrine, droxidopa and florinef in the setting of orthostasis. Droxidopa increased.   - Repeat TTE on 5/21 with global LV dysfunction severely decreased. Well seated TAVR   - to consider CRT-D in the future  - Cannot initiate GDMT due to orthostasis  - would try to mobilize as best as possible and monitor orthostatics, hopefully standing bp can be kept >100  - cont asa and statin  - Hgb stable this AM    #Chronic Systolic HF (EF 30%), mod to severe AS, HTN, LBBB, Lymphedema   - Has known systolic HF and AS.    - Repeat TTE showed EF 30%, mild-mod LVH, mildly reduced RVF, mod-severe AS (.61 cmsq), mod pHTN  - On home Torsemide 20 mg daily, Eplerenone 25 mg daily, and Toprol  mg daily, all currently on hold  - On Midodrine 15mg TID for orthostatic hypotension  - Droxidopa now initiated as well for significant orthostasis  - Still orthostatic  - Unable to initiate GDMT at this time due to significant orthostatic hypotension  - initially CVVHD, then HD  - HD now on hold, as she is urinating and creatinine remains stable, though slightly higher today  - prn iv bumex  - renal fu  - Diagnosed with UE DVT and now on full loading dose of Eliquis  - PT and Bed to chair as much as possible    #Orthostatic hypotension.   -Likely in setting of deconditioning.   - Will attempt orthostatics daily.  Hoping to wean off midodrine in order to restart GDMT. Orthostatics improving from supine to seated, but still problematic with standing. Remains orthostatic and symptomatic on standing    #UTI  - Abx as per primary

## 2024-05-30 NOTE — CONSULT NOTE ADULT - SUBJECTIVE AND OBJECTIVE BOX
HPI:  73F w HFrEF (EF 30%), mod AS, known LBBB, HTN, IDDM1, CKD, hypothyroidism, chronic lymphedema, suspected OHS/LELIA on 3L NC home O2 p/w 1 episode of syncope. Recent admission at [facility] (3/29-4/5) for ADHF and DUNCAN s/p diuresis, discharged with new home O2 3L NC suspecting OHS/LELIA pending outpatient w/u. Since discharge a week ago, she has been staying at home with   Pt had sob walking to car. Once in car, she was still breathing heavily. Partner noticed pt was not responding to verbal stimuli for 10-15sec and witnessed R arm jerking. Pt gained consciousness quickly without postictal symptoms. Pt went to Cardiologist Dr. Nathan who recommended pt to come to ED. No fever, cp, abd pain, n/v. Leg swelling is improved from prior. Pt on torsemide 40mg which she has been taking. Pt was discharged on 3LNC which she is currently on. Patient reports she did not take Farxiga that she was discharged on as she was concerned about side effects.     In ED, patient was found afebrile, hemodynamically stable, breathing well on 3L NC. Initial labs notable for mild hyponatremia, DUNCAN on CKD, elevated proBNP and elevated pCO2 both improved from prior. (12 Apr 2024 16:31)    Patient was admitted on 4/12, s/p TAVR 4/24, with post op VT/Vfib, hospital course with contrast induced allergic reaction and renal failure, CRRT and pressors, now on 3DSU, with orthostatic hypotension, seen today, reports she had a bad night, with vomiting.     REVIEW OF SYSTEMS  Denies chest pain, SOB, F/C, abdominal pain     VITALS  T(C): 37.3 (05-30-24 @ 04:07), Max: 37.3 (05-30-24 @ 04:07)  HR: 99 (05-30-24 @ 04:07) (82 - 99)  BP: 100/54 (05-30-24 @ 04:07) (100/54 - 139/50)  RR: 18 (05-30-24 @ 04:07) (18 - 18)  SpO2: 93% (05-30-24 @ 04:07) (93% - 96%)  Wt(kg): --    PAST MEDICAL & SURGICAL HISTORY  Hypertension    Lymphedema    H/O left bundle branch block    Systolic heart failure, chronic    Type 1 diabetes    H/O cataract    History of surgical removal of meniscus of knee    Frozen shoulder    H/O Achilles tendon repair    Fractured skull        FUNCTIONAL HISTORY  Lives with significant other, few steps to enter   Independent ADLS, used cane, has motorized scooter      CURRENT FUNCTIONAL STATUS  PT  5/29  bed mobility min assist   transfers min assist x 2, RW   +orthostatic     OT 5/24  bed mobility min assist   transfers min assist  +orthostatic  dressing min to max assist     RECENT LABS/IMAGING  CBC Full  -  ( 30 May 2024 06:51 )  WBC Count : 9.32 K/uL  RBC Count : 3.67 M/uL  Hemoglobin : 10.5 g/dL  Hematocrit : 35.6 %  Platelet Count - Automated : 184 K/uL  Mean Cell Volume : 97.0 fl  Mean Cell Hemoglobin : 28.6 pg  Mean Cell Hemoglobin Concentration : 29.5 gm/dL  Auto Neutrophil # : x  Auto Lymphocyte # : x  Auto Monocyte # : x  Auto Eosinophil # : x  Auto Basophil # : x  Auto Neutrophil % : x  Auto Lymphocyte % : x  Auto Monocyte % : x  Auto Eosinophil % : x  Auto Basophil % : x    05-30    135  |  100  |  39<H>  ----------------------------<  275<H>  4.8   |  17<L>  |  2.50<H>    Ca    8.4      30 May 2024 06:51  Phos  3.5     05-30  Mg     2.0     05-30      Urinalysis Basic - ( 30 May 2024 06:51 )    Color: x / Appearance: x / SG: x / pH: x  Gluc: 275 mg/dL / Ketone: x  / Bili: x / Urobili: x   Blood: x / Protein: x / Nitrite: x   Leuk Esterase: x / RBC: x / WBC x   Sq Epi: x / Non Sq Epi: x / Bacteria: x    < from: CT Head No Cont (04.14.24 @ 11:11) >    IMPRESSION:  No CT evidence of acute intracranial pathology.    Status post anterior left frontal cranioplasty with adjacent   encephalomalacia/gliosis versus a resection cavity in the anterior left   frontal lobe.    A 5 mm extra-axial ossific focus in the left frontal region may represent   a meningioma, calvarial hyperostosis or an osteoma.      < end of copied text >    < from: CT Abdomen and Pelvis No Cont (05.03.24 @ 22:14) >    IMPRESSION:  Normal appearance of adrenals.    Mild right hydronephrosis and mild prominence of the proximal-mid ureter   without obstructing stone    < end of copied text >      ALLERGIES  contrast media (iodine-based) (Fever; Vomiting; Flushing; Hypotension; Rash; Stomach Upset)  Ativan (Rash; Urticaria)  penicillins (Hives (Mod to Severe); Anaphylaxis (Mod to Severe); Short breath (Mod to Severe); Angioedema (Mod to Severe))      MEDICATIONS   ammonium lactate 12% Lotion 1 Application(s) Topical <User Schedule>  apixaban 5 milliGRAM(s) Oral two times a day  aspirin  chewable 81 milliGRAM(s) Oral daily  atorvastatin 80 milliGRAM(s) Oral at bedtime  calamine/zinc oxide Lotion 1 Application(s) Topical three times a day  cefTRIAXone   IVPB 1000 milliGRAM(s) IV Intermittent every 24 hours  dextrose 10% Bolus 125 milliLiter(s) IV Bolus once  dextrose 5%. 1000 milliLiter(s) IV Continuous <Continuous>  dextrose 5%. 1000 milliLiter(s) IV Continuous <Continuous>  dextrose 50% Injectable 25 Gram(s) IV Push once  dextrose 50% Injectable 12.5 Gram(s) IV Push once  dextrose Oral Gel 15 Gram(s) Oral once PRN  droxidopa 400 milliGRAM(s) Oral three times a day  ferrous    sulfate 325 milliGRAM(s) Oral two times a day  fludroCORTISONE 0.2 milliGRAM(s) Oral every 12 hours  glucagon  Injectable 1 milliGRAM(s) IntraMuscular once  insulin glargine Injectable (LANTUS) 10 Unit(s) SubCutaneous at bedtime  insulin lispro (ADMELOG) corrective regimen sliding scale   SubCutaneous <User Schedule>  insulin lispro (ADMELOG) corrective regimen sliding scale   SubCutaneous three times a day before meals  insulin lispro Injectable (ADMELOG) 5 Unit(s) SubCutaneous before lunch  insulin lispro Injectable (ADMELOG) 6 Unit(s) SubCutaneous before breakfast  insulin lispro Injectable (ADMELOG) 4 Unit(s) SubCutaneous before dinner  levothyroxine 75 MICROGram(s) Oral daily  midodrine. 15 milliGRAM(s) Oral three times a day  Nephro-molly 1 Tablet(s) Oral daily  pantoprazole    Tablet 40 milliGRAM(s) Oral every 24 hours  triamcinolone 0.1% Ointment 1 Application(s) Topical two times a day      ----------------------------------------------------------------------------------------  PHYSICAL EXAM  Constitutional - laying in bed  Chest - Breathing comfortably on room air   Cardiovascular - S1S2   Extremities - B/L LE in Ace wraps  Neurologic Exam -    follows commands                 Cognitive - Awake, Alert, AAO to self, place, date, year, situation     Communication - Fluent, No dysarthria        Motor -moves all ext 5/5     Sensory - Intact to LT     Psychiatric - Mood stable, Affect WNL  ----------------------------------------------------------------------------------------  ASSESSMENT/PLAN  73yFemale h/o AS, HFrEF, LBBB, DM type 1, CKD, HTN, hypothyroid, chronic lymphedema, LELIA on home O2 with functional deficits after TAVR, with debility   orthostatic hypotension, on midodrine, droxidopa, fludrocortisone   GI bleed, continue to monitor Hb   urinary retention, hardy, urine cultures pending on CTX, reports has vomited with UTI in past   LUE DVT on eliquis     Rehab - Will continue to follow for ongoing rehab needs and recommendations.    continue bedside therapy, out of bed to chair daily    Recommend ACUTE inpatient rehabilitation for the functional deficits consisting of 3 hours of therapy/day x 2-4 weeks depending on progress at rehabilitation facility, 24 hour RN/daily PMR physician for comorbid medical management. Patient will be able to tolerate 3 hours a day.

## 2024-05-30 NOTE — PROGRESS NOTE ADULT - ASSESSMENT
73F w/h/o of T1DM with unknown control due to CKD and anemia of chronic disease. DM c/b CKD. Also h/o HFrEF (EF 30%), mod AS, known LBBB, HTN,  hypothyroidism, chronic lymphedema, suspected OHS/LELIA on 3L NC home O2 p/w 1 episode of syncope with R arm jerking, likely 2/2 severe symptomatic AS. S/p AVR 4/24 c/b DUNCAN on CKD, fever and hypotension. Also UTI/pul edema/ sepsis and L adrenal nodule finding. Also orthostatic hypotension > now on Florinef.   BG Goal 100-180mg/dl .   Last 24 hour BG values variable 157-354 without pattern. BGs usually higher when she has UTI as per pt. Will adjust insulin doses to  BG goal 100 to 180s. No hypoglycemia.     Per endocrine attending Dr Burton> Adrenal nodule work up completed and pt will need to f/u once she is more stable as out pt.       Home regimen: Lantus 33 units qhs, Novolog based on sliding scale TIDAC normally 3-5 units  Has Dexcom G7

## 2024-05-30 NOTE — PROGRESS NOTE ADULT - PROBLEM SELECTOR PLAN 2
5/29 retaining urine requiring straight cath. On straight cath x2 urine was cloudy/milky in appearance. Hardy catheter replaced. UA with >998 WBC, LE, large blood, >36 epithelial cells concerning for UTI. She reports no other urinary sx.  -f/u UCx  -c/w CTX (5/29 - )  -hardy placed 5/29.

## 2024-05-30 NOTE — PROGRESS NOTE ADULT - PROBLEM SELECTOR PLAN 6
DUNCAN secondary to ATN from hypotension and contrast nephropathy. CKD stage 4. s/p CRRT and bumex drip in CICU.  De La Garza for urinary retention (placed 5/10 and replaced ~5/17).  Improving and appears stable 2.2-2.4  - f/u nephro recs   - strict I's and O's  - renally dose meds, avoid nephrotoxic agents.

## 2024-05-30 NOTE — PROGRESS NOTE ADULT - SUBJECTIVE AND OBJECTIVE BOX
Seen earlier today     Chief Complaint: Diabetes Mellitus follow up    INTERVAL HX: Reports an episode of vomiting last night. s/p De La Garza catheter placement for urinary retention and ABX started for UTI.  She is not having nausea anymore, but eating less today. Last 24 hour BG values variable 157-354 without pattern with severe hyperglycemia at HS yesterday. Denies eating snack at HS. BGs usually higher when she has UTI as per pt       Review of Systems:  General: As above  GI: No nausea, vomiting  Endocrine: no  S&Sx of hypoglycemia    Allergies    contrast media (iodine-based) (Fever; Vomiting; Flushing; Hypotension; Rash; Stomach Upset)  Ativan (Rash; Urticaria)  penicillins (Hives (Mod to Severe); Anaphylaxis (Mod to Severe); Short breath (Mod to Severe); Angioedema (Mod to Severe))    Intolerances      MEDICATIONS  (STANDING):  ammonium lactate 12% Lotion 1 Application(s) Topical <User Schedule>  apixaban 5 milliGRAM(s) Oral two times a day  aspirin  chewable 81 milliGRAM(s) Oral daily  atorvastatin 80 milliGRAM(s) Oral at bedtime  calamine/zinc oxide Lotion 1 Application(s) Topical three times a day  cefTRIAXone   IVPB 1000 milliGRAM(s) IV Intermittent every 24 hours  dextrose 10% Bolus 125 milliLiter(s) IV Bolus once  dextrose 5%. 1000 milliLiter(s) (50 mL/Hr) IV Continuous <Continuous>  dextrose 5%. 1000 milliLiter(s) (100 mL/Hr) IV Continuous <Continuous>  dextrose 50% Injectable 25 Gram(s) IV Push once  dextrose 50% Injectable 12.5 Gram(s) IV Push once  droxidopa 400 milliGRAM(s) Oral three times a day  ferrous    sulfate 325 milliGRAM(s) Oral two times a day  fludroCORTISONE 0.2 milliGRAM(s) Oral every 12 hours  glucagon  Injectable 1 milliGRAM(s) IntraMuscular once  insulin glargine Injectable (LANTUS) 12 Unit(s) SubCutaneous at bedtime  insulin lispro (ADMELOG) corrective regimen sliding scale   SubCutaneous <User Schedule>  insulin lispro (ADMELOG) corrective regimen sliding scale   SubCutaneous three times a day before meals  insulin lispro Injectable (ADMELOG) 5 Unit(s) SubCutaneous before lunch  insulin lispro Injectable (ADMELOG) 6 Unit(s) SubCutaneous before breakfast  insulin lispro Injectable (ADMELOG) 4 Unit(s) SubCutaneous before dinner  levothyroxine 75 MICROGram(s) Oral daily  midodrine. 15 milliGRAM(s) Oral three times a day  Nephro-molly 1 Tablet(s) Oral daily  pantoprazole    Tablet 40 milliGRAM(s) Oral every 24 hours  triamcinolone 0.1% Ointment 1 Application(s) Topical two times a day        fludroCORTISONE   0.2 milliGRAM(s) Oral (05-30-24 @ 17:21)   0.2 milliGRAM(s) Oral (05-30-24 @ 05:11)    insulin glargine Injectable (LANTUS)   10 Unit(s) SubCutaneous (05-29-24 @ 22:23)    insulin lispro (ADMELOG) corrective regimen sliding scale   3 Unit(s) SubCutaneous (05-30-24 @ 17:22)   3 Unit(s) SubCutaneous (05-30-24 @ 09:16)    insulin lispro (ADMELOG) corrective regimen sliding scale   1 Unit(s) SubCutaneous (05-30-24 @ 02:34)   3 Unit(s) SubCutaneous (05-29-24 @ 22:23)    insulin lispro Injectable (ADMELOG)   6 Unit(s) SubCutaneous (05-30-24 @ 09:16)    insulin lispro Injectable (ADMELOG)   4 Unit(s) SubCutaneous (05-30-24 @ 17:22)    levothyroxine   75 MICROGram(s) Oral (05-30-24 @ 05:11)        PHYSICAL EXAM:  VITALS: T(C): 36.8 (05-30-24 @ 16:33)  T(F): 98.2 (05-30-24 @ 16:33), Max: 99.2 (05-30-24 @ 04:07)  HR: 78 (05-30-24 @ 16:33) (76 - 99)  BP: 128/67 (05-30-24 @ 16:33) (100/54 - 128/67)  RR:  (18 - 18)  SpO2:  (93% - 96%)  Wt(kg): --  GENERAL: Female laying in bed, in NAD  Respiratory: Respirations unlabored   Extremities: Warm, no edema  NEURO: Alert , appropriate     LABS:  POCT Blood Glucose.: 282 mg/dL (05-30-24 @ 17:19)  POCT Blood Glucose.: 157 mg/dL (05-30-24 @ 12:43)  POCT Blood Glucose.: 261 mg/dL (05-30-24 @ 08:48)  POCT Blood Glucose.: 260 mg/dL (05-30-24 @ 02:28)  POCT Blood Glucose.: 354 mg/dL (05-29-24 @ 22:22)  POCT Blood Glucose.: 274 mg/dL (05-29-24 @ 21:02)  POCT Blood Glucose.: 164 mg/dL (05-29-24 @ 17:09)  POCT Blood Glucose.: 206 mg/dL (05-29-24 @ 12:43)  POCT Blood Glucose.: 193 mg/dL (05-29-24 @ 09:00)  POCT Blood Glucose.: 182 mg/dL (05-29-24 @ 02:22)  POCT Blood Glucose.: 150 mg/dL (05-28-24 @ 21:51)  POCT Blood Glucose.: 199 mg/dL (05-28-24 @ 17:21)  POCT Blood Glucose.: 142 mg/dL (05-28-24 @ 12:43)  POCT Blood Glucose.: 156 mg/dL (05-28-24 @ 08:45)  POCT Blood Glucose.: 119 mg/dL (05-28-24 @ 02:26)  POCT Blood Glucose.: 96 mg/dL (05-27-24 @ 21:39)                          10.5   9.32  )-----------( 184      ( 30 May 2024 06:51 )             35.6     05-30    135  |  100  |  39<H>  ----------------------------<  275<H>  4.8   |  17<L>  |  2.50<H>    Ca    8.4      30 May 2024 06:51  Phos  3.5     05-30  Mg     2.0     05-30      eGFR: 20 mL/min/1.73m2 (30 May 2024 06:51)    04-13 Chol 74 Direct LDL -- LDL calculated 25 HDL 33<L> Trig 70  Thyroid Function Tests:  05-28 @ 06:42 TSH 10.20 FreeT4 1.0 T3 -- Anti TPO -- Anti Thyroglobulin Ab -- TSI --      A1C with Estimated Average Glucose Result: 7.4 % (03-30-24 @ 08:21)    Estimated Average Glucose: 166 mg/dL (03-30-24 @ 08:21)        Diet, Consistent Carbohydrate w/Evening Snack:   Kwesi(7 Gm Arginine/7 Gm Glut/1.2 Gm HMB     Qty per Day:  2 (05-28-24 @ 15:11) [Active]

## 2024-05-30 NOTE — PROGRESS NOTE ADULT - PROBLEM SELECTOR PLAN 2
- Thyroid Function Tests:  05-28 @ 06:42 TSH 10.20 FreeT4 1.0-- T3 -- Anti TPO -- Anti Thyroglobulin Ab -- TSI --  - Continue home LT4 75mcg daily. Noted LT4 given with Protonix which inhibits LT4 absorption causing TSH levels to go up.   -Check Free T4 (Add on today's TSH)  - PLEASE make sure LT4 is given on an empty stomach at least one hour apart from other meds and food to ensure med absorption. DO NOT GIVE WITH VITAMINS/ANTACIDS. Schedule LT4 for 5 am and Protonix for 8-9am  Spoke to primary team about this.

## 2024-05-30 NOTE — PROGRESS NOTE ADULT - SUBJECTIVE AND OBJECTIVE BOX
[facility] - Howard Kimble, Carlos Luong, Herman Alston  Office Number:  [phone]    Patient resting comfortably in bed in NAD.  Laying flat with no respiratory distress.  No complaints of chest pain, dyspnea, palpitations, PND, or orthopnea.  With pus in urine, now with UTI  [place] resumed for urinary retention  Feeling ill this AM 2/2 UTI    ROS: negative unless otherwise mentioned.    Telemetry:  SR first degree    MEDICATIONS  (STANDING):  ammonium lactate 12% Lotion 1 Application(s) Topical <User Schedule>  apixaban 5 milliGRAM(s) Oral two times a day  aspirin  chewable 81 milliGRAM(s) Oral daily  atorvastatin 80 milliGRAM(s) Oral at bedtime  calamine/zinc oxide Lotion 1 Application(s) Topical three times a day  cefTRIAXone   IVPB 1000 milliGRAM(s) IV Intermittent every 24 hours  dextrose 10% Bolus 125 milliLiter(s) IV Bolus once  dextrose 5%. 1000 milliLiter(s) (50 mL/Hr) IV Continuous <Continuous>  dextrose 5%. 1000 milliLiter(s) (100 mL/Hr) IV Continuous <Continuous>  dextrose 50% Injectable 25 Gram(s) IV Push once  dextrose 50% Injectable 12.5 Gram(s) IV Push once  droxidopa 400 milliGRAM(s) Oral three times a day  ferrous    sulfate 325 milliGRAM(s) Oral two times a day  fludroCORTISONE 0.2 milliGRAM(s) Oral every 12 hours  glucagon  Injectable 1 milliGRAM(s) IntraMuscular once  insulin glargine Injectable (LANTUS) 10 Unit(s) SubCutaneous at bedtime  insulin lispro (ADMELOG) corrective regimen sliding scale   SubCutaneous <User Schedule>  insulin lispro (ADMELOG) corrective regimen sliding scale   SubCutaneous three times a day before meals  insulin lispro Injectable (ADMELOG) 5 Unit(s) SubCutaneous before lunch  insulin lispro Injectable (ADMELOG) 6 Unit(s) SubCutaneous before breakfast  insulin lispro Injectable (ADMELOG) 4 Unit(s) SubCutaneous before dinner  levothyroxine 75 MICROGram(s) Oral daily  midodrine. 15 milliGRAM(s) Oral three times a day  Nephro-molly 1 Tablet(s) Oral daily  pantoprazole    Tablet 40 milliGRAM(s) Oral every 24 hours  triamcinolone 0.1% Ointment 1 Application(s) Topical two times a day    MEDICATIONS  (PRN):  dextrose Oral Gel 15 Gram(s) Oral once PRN Blood Glucose LESS THAN 70 milliGRAM(s)/deciliter      Allergies    contrast media (iodine-based) (Fever; Vomiting; Flushing; Hypotension; Rash; Stomach Upset)  Ativan (Rash; Urticaria)  penicillins (Hives (Mod to Severe); Anaphylaxis (Mod to Severe); Short breath (Mod to Severe); Angioedema (Mod to Severe))    Intolerances        Vital Signs Last 24 Hrs  T(C): 37.3 (30 May 2024 04:07), Max: 37.3 (30 May 2024 04:07)  T(F): 99.2 (30 May 2024 04:07), Max: 99.2 (30 May 2024 04:07)  HR: 99 (30 May 2024 04:07) (82 - 99)  BP: 100/54 (30 May 2024 04:07) (100/54 - 139/50)  BP(mean): --  RR: 18 (30 May 2024 04:07) (18 - 18)  SpO2: 93% (30 May 2024 04:07) (93% - 96%)    Parameters below as of 30 May 2024 04:07  Patient On (Oxygen Delivery Method): room air        I&O's Summary    29 May 2024 07:01  -  30 May 2024 07:00  --------------------------------------------------------  IN: 300 mL / OUT: 1400 mL / NET: -1100 mL        ON EXAM:    General: NAD, awake and alert, oriented x 3  HEENT: Mucous membranes are moist, anicteric  Lungs: Non-labored, breath sounds are clear bilaterally, No wheezing, rales or rhonchi  Cardiovascular: Regular, S1 and S2, 2/6 harsh systolic murmur best heard RUSB  Gastrointestinal: Bowel Sounds present, soft, nontender.   Lymph: chronic peripheral edema. No lymphadenopathy.    LABS: All Labs Reviewed:                        10.5   9.32  )-----------( 184      ( 30 May 2024 06:51 )             35.6                         9.7    7.37  )-----------( 182      ( 29 May 2024 07:02 )             31.8                         9.7    8.09  )-----------( 173      ( 28 May 2024 06:42 )             31.5     30 May 2024 06:51    135    |  100    |  39     ----------------------------<  275    4.8     |  17     |  2.50   29 May 2024 07:02    134    |  103    |  33     ----------------------------<  163    4.8     |  19     |  2.39   28 May 2024 06:40    134    |  100    |  34     ----------------------------<  141    4.0     |  23     |  2.40     Ca    8.4        30 May 2024 06:51  Ca    8.3        29 May 2024 07:02  Ca    8.4        28 May 2024 06:40  Phos  3.5       30 May 2024 06:51  Phos  4.0       29 May 2024 07:02  Phos  3.8       28 May 2024 06:40  Mg     2.0       30 May 2024 06:51  Mg     2.0       29 May 2024 07:02  Mg     2.0       28 May 2024 06:40    TPro  6.2    /  Alb  2.9    /  TBili  0.5    /  DBili  x      /  AST  23     /  ALT  11     /  AlkPhos  95     27 May 2024 10:25          Blood Culture:     05-28 @ 06:42  TSH: 10.20

## 2024-05-30 NOTE — PROGRESS NOTE ADULT - ATTENDING COMMENTS
73F PMH: HFrEF (EF 30%), AS, LBBB, HTN, DM1, CKD, hypothyroidism, chronic lymphedema, LELIA (3L home O2) p/w for syncope 2/2 severe AS, s/p TAVR 4/24. Course complicated by contrast induced allergic reaction (had CTA for TAVR eval) and contrast related renal failure (acute on chronic) requiring HD. TAVR c/b VT and vfib requiring shock and flash pulmonary edema causing Acute hypoxic respiratory failure  requiring intubation.     Course complicated by contrast induced allergic reaction (had CTA for TAVR eval) and contrast related renal failure (acute on chronic) requiring CRRT for fluid removal and pressors for vasoplegia and hypovolemia due to CRRT. Patient extubated, now off pressors, CRRT.        #Orthostatic hypotension- droxydopa, midodrine with florinef. patient's orthostatics still positive but improving.   #UTI with urinary retention- placed on CTX- await cultures  # Left UE DVT- c/w eliquis.   #T1DM- uncontrolled with intermittent episodes of labile BS- . Being managed by endocrinology. Currently on Lantus and Admelog. Elevated BS likley secondary to infection as well as D5 in the CTX (now changed)  # DUNCAN on CKD stage 4- cr stable-   # HFrEF- currently GDMT is on hold secondary to OH.

## 2024-05-31 NOTE — PROGRESS NOTE ADULT - PROBLEM SELECTOR PLAN 12
DVT ppx: Eliquis  Diet: CC   Dispo: pending clinical course, PT rec [facility]. PM&R recommend ACUTE inpatient rehabilitation     AVOID QT PROLONGING AGENTS - QTc 541 5/30/24

## 2024-05-31 NOTE — PROGRESS NOTE ADULT - NSPROGADDITIONALINFOA_GEN_ALL_CORE
-Plan discussed with pt/team.  Contact info: [phone] (24/7). pager [phone]  Amion on [employer]  Teams on M-T-W-F. Unavailable Thu/Weekends/Holidays  Assessed pt/labs/meds and discussed plan of care with primary team  Adjusting insulin  Discharge plan  Follow up care

## 2024-05-31 NOTE — PROGRESS NOTE ADULT - ATTENDING COMMENTS
73F PMH: HFrEF (EF 30%), AS, LBBB, HTN, DM1, CKD, hypothyroidism, chronic lymphedema, LELIA (3L home O2) p/w for syncope 2/2 severe AS, s/p TAVR 4/24. Course complicated by contrast induced allergic reaction (had CTA for TAVR eval) and contrast related renal failure (acute on chronic) requiring HD. TAVR c/b VT and vfib requiring shock and flash pulmonary edema causing Acute hypoxic respiratory failure  requiring intubation.     Course complicated by contrast induced allergic reaction (had CTA for TAVR eval) and contrast related renal failure (acute on chronic) requiring CRRT for fluid removal and pressors for vasoplegia and hypovolemia due to CRRT. Patient extubated, now off pressors, CRRT.        #Orthostatic hypotension- droxydopa, midodrine with florinef. patient's orthostatics still positive but improving.   #UTI with urinary retention- placed on CTX- await cultures  # Left UE DVT- c/w eliquis. Will repeat US as swelling still significant  #T1DM- uncontrolled with intermittent episodes of labile BS- . Being managed by endocrinology. Currently on Lantus and Admelog.   # DUNCAN on CKD stage 4- cr stable-   # HFrEF- currently GDMT is on hold secondary to OH.

## 2024-05-31 NOTE — PROGRESS NOTE ADULT - PROBLEM SELECTOR PLAN 2
- Thyroid Function Tests:  05-28 @ 06:42 TSH 10.20 FreeT4 -- T3 -- Anti TPO -- Anti Thyroglobulin Ab -- TSI --  - Continue home LT4 75mcg daily. Noted LT4 given with Protonix which inhibits LT4 absorption causing TSH levels to go up.   -Check Free T4 (Add on today's TSH)  - PLEASE make sure LT4 is given on an empty stomach at least one hour apart from other meds and food to ensure med absorption. DO NOT GIVE WITH VITAMINS/ANTACIDS. Noted now pt getting LT4 and PPI at different times

## 2024-05-31 NOTE — PROGRESS NOTE ADULT - SUBJECTIVE AND OBJECTIVE BOX
[facility]  Franklin Smith PA-C  [address] [place] [address]  [phone]      INTERVAL HPI/OVERNIGHT EVENTS:  Seen and examined  hgb stable no new events           MEDICATIONS  (STANDING):  ammonium lactate 12% Lotion 1 Application(s) Topical <User Schedule>  apixaban 5 milliGRAM(s) Oral two times a day  aspirin  chewable 81 milliGRAM(s) Oral daily  atorvastatin 80 milliGRAM(s) Oral at bedtime  calamine/zinc oxide Lotion 1 Application(s) Topical three times a day  dextrose 10% Bolus 125 milliLiter(s) IV Bolus once  dextrose 5%. 1000 milliLiter(s) (100 mL/Hr) IV Continuous <Continuous>  dextrose 5%. 1000 milliLiter(s) (50 mL/Hr) IV Continuous <Continuous>  dextrose 50% Injectable 12.5 Gram(s) IV Push once  dextrose 50% Injectable 25 Gram(s) IV Push once  droxidopa 200 milliGRAM(s) Oral three times a day  ferrous    sulfate 325 milliGRAM(s) Oral two times a day  fludroCORTISONE 0.2 milliGRAM(s) Oral daily  glucagon  Injectable 1 milliGRAM(s) IntraMuscular once  insulin glargine Injectable (LANTUS) 8 Unit(s) SubCutaneous at bedtime  insulin lispro (ADMELOG) corrective regimen sliding scale   SubCutaneous three times a day before meals  insulin lispro (ADMELOG) corrective regimen sliding scale   SubCutaneous <User Schedule>  insulin lispro Injectable (ADMELOG) 5 Unit(s) SubCutaneous three times a day before meals  levothyroxine 75 MICROGram(s) Oral daily  midodrine. 15 milliGRAM(s) Oral three times a day  Nephro-molly 1 Tablet(s) Oral daily  pantoprazole  Injectable 40 milliGRAM(s) IV Push two times a day  triamcinolone 0.1% Ointment 1 Application(s) Topical two times a day    MEDICATIONS  (PRN):  dextrose Oral Gel 15 Gram(s) Oral once PRN Blood Glucose LESS THAN 70 milliGRAM(s)/deciliter      Allergies    contrast media (iodine-based) (Fever; Vomiting; Flushing; Hypotension; Rash; Stomach Upset)  Ativan (Rash; Urticaria)  penicillins (Hives (Mod to Severe); Anaphylaxis (Mod to Severe); Short breath (Mod to Severe); Angioedema (Mod to Severe))    Intolerances                ROS:     General:  No wt loss, fevers, chills, night sweats, fatigue,   Eyes:  Good vision, no reported pain  ENT:  No sore throat, pain, runny nose, dysphagia  CV:  No pain, palpitations, hypo/hypertension  Resp:  No dyspnea, cough, tachypnea, wheezing  GI:  No pain, No nausea, No vomiting, No diarrhea, No constipation, No weight loss, No fever, No pruritis, No rectal bleeding, No tarry stools, No dysphagia,  :  No pain, bleeding, incontinence, nocturia  Muscle:  No pain, weakness  Neuro:  No weakness, tingling, memory problems  Psych:  No fatigue, insomnia, mood problems, depression  Endocrine:  No polyuria, polydipsia, cold/heat intolerance  Heme:  No petechiae, ecchymosis, easy bruisability  Skin:  No rash, tattoos, scars, edema      PHYSICAL EXAM  Vital Signs Last 24 Hrs  T(C): 36.9 (26 May 2024 04:31), Max: 36.9 (26 May 2024 04:31)  T(F): 98.5 (26 May 2024 04:31), Max: 98.5 (26 May 2024 04:31)  HR: 87 (26 May 2024 04:31) (87 - 93)  BP: 100/64 (26 May 2024 04:31) (100/64 - 102/61)  BP(mean): --  RR: 18 (26 May 2024 04:31) (18 - 18)  SpO2: 94% (26 May 2024 04:31) (94% - 96%)    Parameters below as of 26 May 2024 04:31  Patient On (Oxygen Delivery Method): room air          GENERAL:  Appears stated age  HEENT:  NC/AT  CHEST:  Full & symmetric excursion  HEART:  Regular rhythm  ABDOMEN:  Soft, non-tender, non-distended  EXTEREMITIES:  no cyanosis  SKIN:  No rash  NEURO:  Alert            LABS:                        10.7   9.58  )-----------( 178      ( 26 May 2024 07:06 )             35.9     05-26    133<L>  |  100  |  35<H>  ----------------------------<  199<H>  4.1   |  22  |  2.20<H>    Ca    8.2<L>      26 May 2024 07:06  Phos  3.6     05-26  Mg     1.9     05-26    TPro  5.6<L>  /  Alb  2.6<L>  /  TBili  0.5  /  DBili  x   /  AST  20  /  ALT  11  /  AlkPhos  79  05-26 05-25-24 @ 07:01  -  05-26-24 @ 07:00  --------------------------------------------------------  IN: 740 mL / OUT: 900 mL / NET: -160 mL            RADIOLOGY & ADDITIONAL TESTS:

## 2024-05-31 NOTE — PROGRESS NOTE ADULT - PROBLEM SELECTOR PLAN 3
Incidentally found on CT c/a/p w/wo IV contrast done for TAVR evaluation. The left adrenal gland is thickened and a 1.2 x 0.8 cm left adrenal nodule is seen. The right adrenal gland is normal.   Primary team discussed with radiology. HU of the adrenal nodule not able to be accurately determined due to motion artifact, also given imaging was taken at venous phase.    Patient had another CT scan which showed normal adrenals on 5/3/24  Test for excess adrenal hormones:   - Dex suppression test: AM cortisol 9.4 not suppressed with sufficiently high dexamethasone 374 (4/18/24). Repeat test AM cortisol 4.1 not suppressed with ACTH 9.5, dex level 449 (4/20/24). Concerning for possible Cushings, likely primary adrenal source given ACTH not elevated, however, patient is also in ICU on pressors and in a stressed state. Urine cortisol low at 1.2mcg/24 hour but only 200ml for 24 hours. Salivary cortisol cancelled for QNS, repeat salivary cortisol specimen received   - plasma metanephrines 46.8 normal range    - serum aldosterone is elevated to 37.4. Plasma renin activity 9.775. Ratio = 3.82, not elevated, no hyperaldosteronism.    - DHEAS 139 wnl. Androstenedione <10.    PLAN  - Patient with 2 positive DST - concerning for cushing syndrome. No indication for treatment at this time, would recommend repeating testing outpatient after patient resolved from acute critical illness - will give signout to patient's endocrinologist Dr. Luis Dumont prior to discharge   - Follow up salivary cortisol as out pt as per Dr Burton. Dr Burton has sopken to primary team addressing this issue    - May need to repeat 24 hour urine cortisol once renal function improved and no longer on CRRT   -Renal function improving but remains with creat in 3s. No need to test at this time. If team wishes can send salivary cortisol and 24 hour urine cortisol prior to discharge but per Dr Burton, test results won't be available  until 1-2 weeks.   - Will need follow up outpatient with Dr. Luis Dumont

## 2024-05-31 NOTE — PROGRESS NOTE ADULT - SUBJECTIVE AND OBJECTIVE BOX
Marcial Erickson MD  PGY 1 Department of Internal Medicine        Patient is a 73y old  Female who presents with a chief complaint of Syncope (30 May 2024 18:51)      SUBJECTIVE / OVERNIGHT EVENTS: Pt seen and examined. No acute overnight events. Denies fevers, chills, CP, SOB, Abdominal pain, N/V, Constipation, Diarrhea        MEDICATIONS  (STANDING):  ammonium lactate 12% Lotion 1 Application(s) Topical <User Schedule>  apixaban 5 milliGRAM(s) Oral two times a day  aspirin  chewable 81 milliGRAM(s) Oral daily  atorvastatin 80 milliGRAM(s) Oral at bedtime  calamine/zinc oxide Lotion 1 Application(s) Topical three times a day  cefTRIAXone   IVPB 1000 milliGRAM(s) IV Intermittent every 24 hours  dextrose 10% Bolus 125 milliLiter(s) IV Bolus once  dextrose 5%. 1000 milliLiter(s) (50 mL/Hr) IV Continuous <Continuous>  dextrose 5%. 1000 milliLiter(s) (100 mL/Hr) IV Continuous <Continuous>  dextrose 50% Injectable 25 Gram(s) IV Push once  dextrose 50% Injectable 12.5 Gram(s) IV Push once  droxidopa 400 milliGRAM(s) Oral three times a day  ferrous    sulfate 325 milliGRAM(s) Oral two times a day  fludroCORTISONE 0.2 milliGRAM(s) Oral every 12 hours  glucagon  Injectable 1 milliGRAM(s) IntraMuscular once  insulin glargine Injectable (LANTUS) 12 Unit(s) SubCutaneous at bedtime  insulin lispro (ADMELOG) corrective regimen sliding scale   SubCutaneous three times a day before meals  insulin lispro (ADMELOG) corrective regimen sliding scale   SubCutaneous <User Schedule>  insulin lispro Injectable (ADMELOG) 6 Unit(s) SubCutaneous before breakfast  insulin lispro Injectable (ADMELOG) 4 Unit(s) SubCutaneous before dinner  insulin lispro Injectable (ADMELOG) 5 Unit(s) SubCutaneous before lunch  levothyroxine 75 MICROGram(s) Oral daily  midodrine. 15 milliGRAM(s) Oral three times a day  Nephro-molly 1 Tablet(s) Oral daily  pantoprazole    Tablet 40 milliGRAM(s) Oral every 24 hours  triamcinolone 0.1% Ointment 1 Application(s) Topical two times a day    MEDICATIONS  (PRN):  dextrose Oral Gel 15 Gram(s) Oral once PRN Blood Glucose LESS THAN 70 milliGRAM(s)/deciliter      I&O's Summary    30 May 2024 07:01  -  31 May 2024 07:00  --------------------------------------------------------  IN: 550 mL / OUT: 480 mL / NET: 70 mL        Vital Signs Last 24 Hrs  T(C): 36.8 (31 May 2024 04:31), Max: 36.9 (30 May 2024 11:42)  T(F): 98.2 (31 May 2024 04:31), Max: 98.5 (30 May 2024 11:42)  HR: 78 (31 May 2024 04:31) (64 - 78)  BP: 120/64 (31 May 2024 04:31) (108/63 - 129/65)  BP(mean): --  RR: 18 (31 May 2024 04:31) (18 - 18)  SpO2: 97% (31 May 2024 04:31) (96% - 97%)    Parameters below as of 31 May 2024 04:31  Patient On (Oxygen Delivery Method): room air        CAPILLARY BLOOD GLUCOSE      POCT Blood Glucose.: 272 mg/dL (31 May 2024 02:03)  POCT Blood Glucose.: 241 mg/dL (30 May 2024 21:14)  POCT Blood Glucose.: 282 mg/dL (30 May 2024 17:19)  POCT Blood Glucose.: 157 mg/dL (30 May 2024 12:43)  POCT Blood Glucose.: 261 mg/dL (30 May 2024 08:48)      PHYSICAL EXAM:  GENERAL: NAD,   HEAD:  Atraumatic, Normocephalic  EYES: EOMI, PERRL, conjunctiva and sclera clear  NECK: No JVD  CHEST/LUNG: Clear to auscultation bilaterally; No wheeze  HEART: Regular rate and rhythm; No murmurs, rubs, or gallops  ABDOMEN: Soft, Nontender, Nondistended; Bowel sounds present  EXTREMITIES:  2+ Peripheral Pulses, No clubbing, cyanosis, or edema  PSYCH: AAOx3  NEUROLOGY: non-focal  SKIN: No rashes or lesions       LABS:                        10.5   9.32  )-----------( 184      ( 30 May 2024 06:51 )             35.6     Auto Eosinophil # x     / Auto Eosinophil % x     / Auto Neutrophil # x     / Auto Neutrophil % x     / BANDS % x                            9.7    7.37  )-----------( 182      ( 29 May 2024 07:02 )             31.8     Auto Eosinophil # x     / Auto Eosinophil % x     / Auto Neutrophil # x     / Auto Neutrophil % x     / BANDS % x        05-30    135  |  100  |  39<H>  ----------------------------<  275<H>  4.8   |  17<L>  |  2.50<H>  05-29    134<L>  |  103  |  33<H>  ----------------------------<  163<H>  4.8   |  19<L>  |  2.39<H>    Ca    8.4      30 May 2024 06:51  Mg     2.0     05-30  Phos  3.5     05-30          Urinalysis Basic - ( 30 May 2024 06:51 )    Color: x / Appearance: x / SG: x / pH: x  Gluc: 275 mg/dL / Ketone: x  / Bili: x / Urobili: x   Blood: x / Protein: x / Nitrite: x   Leuk Esterase: x / RBC: x / WBC x   Sq Epi: x / Non Sq Epi: x / Bacteria: x            RADIOLOGY & ADDITIONAL TESTS:    Imaging Personally Reviewed:    Consultant(s) Notes Reviewed:      Care Discussed with Consultants/Other Providers:   Marcial Erickson MD  PGY 1 Department of Internal Medicine        Patient is a 73y old  Female who presents with a chief complaint of Syncope (30 May 2024 18:51)      SUBJECTIVE / OVERNIGHT EVENTS: Pt seen and examined. No acute overnight events. No complaints at this time. Denies fevers, chills, CP, SOB, Abdominal pain, N/V, Constipation, Diarrhea        MEDICATIONS  (STANDING):  ammonium lactate 12% Lotion 1 Application(s) Topical <User Schedule>  apixaban 5 milliGRAM(s) Oral two times a day  aspirin  chewable 81 milliGRAM(s) Oral daily  atorvastatin 80 milliGRAM(s) Oral at bedtime  calamine/zinc oxide Lotion 1 Application(s) Topical three times a day  cefTRIAXone   IVPB 1000 milliGRAM(s) IV Intermittent every 24 hours  dextrose 10% Bolus 125 milliLiter(s) IV Bolus once  dextrose 5%. 1000 milliLiter(s) (50 mL/Hr) IV Continuous <Continuous>  dextrose 5%. 1000 milliLiter(s) (100 mL/Hr) IV Continuous <Continuous>  dextrose 50% Injectable 25 Gram(s) IV Push once  dextrose 50% Injectable 12.5 Gram(s) IV Push once  droxidopa 400 milliGRAM(s) Oral three times a day  ferrous    sulfate 325 milliGRAM(s) Oral two times a day  fludroCORTISONE 0.2 milliGRAM(s) Oral every 12 hours  glucagon  Injectable 1 milliGRAM(s) IntraMuscular once  insulin glargine Injectable (LANTUS) 12 Unit(s) SubCutaneous at bedtime  insulin lispro (ADMELOG) corrective regimen sliding scale   SubCutaneous three times a day before meals  insulin lispro (ADMELOG) corrective regimen sliding scale   SubCutaneous <User Schedule>  insulin lispro Injectable (ADMELOG) 6 Unit(s) SubCutaneous before breakfast  insulin lispro Injectable (ADMELOG) 4 Unit(s) SubCutaneous before dinner  insulin lispro Injectable (ADMELOG) 5 Unit(s) SubCutaneous before lunch  levothyroxine 75 MICROGram(s) Oral daily  midodrine. 15 milliGRAM(s) Oral three times a day  Nephro-molly 1 Tablet(s) Oral daily  pantoprazole    Tablet 40 milliGRAM(s) Oral every 24 hours  triamcinolone 0.1% Ointment 1 Application(s) Topical two times a day    MEDICATIONS  (PRN):  dextrose Oral Gel 15 Gram(s) Oral once PRN Blood Glucose LESS THAN 70 milliGRAM(s)/deciliter      I&O's Summary    30 May 2024 07:01  -  31 May 2024 07:00  --------------------------------------------------------  IN: 550 mL / OUT: 480 mL / NET: 70 mL        Vital Signs Last 24 Hrs  T(C): 36.8 (31 May 2024 04:31), Max: 36.9 (30 May 2024 11:42)  T(F): 98.2 (31 May 2024 04:31), Max: 98.5 (30 May 2024 11:42)  HR: 78 (31 May 2024 04:31) (64 - 78)  BP: 120/64 (31 May 2024 04:31) (108/63 - 129/65)  BP(mean): --  RR: 18 (31 May 2024 04:31) (18 - 18)  SpO2: 97% (31 May 2024 04:31) (96% - 97%)    Parameters below as of 31 May 2024 04:31  Patient On (Oxygen Delivery Method): room air        CAPILLARY BLOOD GLUCOSE      POCT Blood Glucose.: 272 mg/dL (31 May 2024 02:03)  POCT Blood Glucose.: 241 mg/dL (30 May 2024 21:14)  POCT Blood Glucose.: 282 mg/dL (30 May 2024 17:19)  POCT Blood Glucose.: 157 mg/dL (30 May 2024 12:43)  POCT Blood Glucose.: 261 mg/dL (30 May 2024 08:48)      PHYSICAL EXAM:  GENERAL: NAD  HEAD:  Atraumatic, Normocephalic  EYES: EOMI, conjunctiva and sclera clear  ENMT: Moist mucous membranes  CHEST/LUNG: Clear to auscultation bilaterally; No rales, rhonchi, wheezing, or rubs  HEART: Regular rate and rhythm, systolic murmur  ABDOMEN: Soft, Nontender, Nondistended; Bowel sounds present  EXTREMITIES:  2+ Peripheral Pulses, 1-2+ b/l edema up to thighs in ace wrap  SKIN: erythematous, macular rash to anterior chest wall, neck, and upper extremities improving  NERVOUS SYSTEM:  Alert, no focal deficits  PSYCH:  Appropriate affect       LABS:                        10.5   9.32  )-----------( 184      ( 30 May 2024 06:51 )             35.6     Auto Eosinophil # x     / Auto Eosinophil % x     / Auto Neutrophil # x     / Auto Neutrophil % x     / BANDS % x                            9.7    7.37  )-----------( 182      ( 29 May 2024 07:02 )             31.8     Auto Eosinophil # x     / Auto Eosinophil % x     / Auto Neutrophil # x     / Auto Neutrophil % x     / BANDS % x        05-30    135  |  100  |  39<H>  ----------------------------<  275<H>  4.8   |  17<L>  |  2.50<H>  05-29    134<L>  |  103  |  33<H>  ----------------------------<  163<H>  4.8   |  19<L>  |  2.39<H>    Ca    8.4      30 May 2024 06:51  Mg     2.0     05-30  Phos  3.5     05-30          Urinalysis Basic - ( 30 May 2024 06:51 )    Color: x / Appearance: x / SG: x / pH: x  Gluc: 275 mg/dL / Ketone: x  / Bili: x / Urobili: x   Blood: x / Protein: x / Nitrite: x   Leuk Esterase: x / RBC: x / WBC x   Sq Epi: x / Non Sq Epi: x / Bacteria: x            RADIOLOGY & ADDITIONAL TESTS:    Imaging Personally Reviewed:    Consultant(s) Notes Reviewed:      Care Discussed with Consultants/Other Providers:

## 2024-05-31 NOTE — PROGRESS NOTE ADULT - PROBLEM SELECTOR PLAN 1
- Test BG ac and hs/2am  - Changed Lantus dose to 15 units at hs.   - Change Admelog dose to 8 units TIDAC.  Pt to inform staff if not eating or not eating enough. Can order 50% of meal time insulin if pt eating 50% or less of her meal. Pt has T1DM and needs insulin whenever she eats carbs.   - C/w Low dose admelog correction scale TIDAC, Low dose admelog correction scale QHS and 2am in case of rebound hypo/hyperglycemia  Discharge:  Will be determined according to BG/insulin needs/PO intake  at time of discharge. Basal/bolus insulin doses TBD  - Of note, patient instructed on discharge from recent hospitalization at [place] to start farxiga for CHF. Given risks of DKA of SGLT2i in T1DM, would not start until better data is available for its benefits.  - Follow up with Dr. Luis Dumont outpatient  -Pt will likely required rehab  F/u with optho/renal/cardiac as out pt

## 2024-05-31 NOTE — PROGRESS NOTE ADULT - ASSESSMENT
73F w HFrEF (EF 30%), mod AS, known LBBB, HTN, IDDM1, CKD, hypothyroidism, chronic lymphedema, suspected OHS/LELIA on 3L NC home O2 p/w 1 episode of syncope. Recent admission at [facility] (3/29-4/5) for ADHF and DUNCAN s/p diuresis, discharged with new home O2 3L NC suspecting OHS/LELIA pending outpatient w/u. Since discharge a week ago, she has been staying at home with   Pt had sob walking to car. Once in car, she was still breathing heavily. Partner noticed pt was not responding to verbal stimuli for 10-15sec and witnessed R arm jerking. Pt gained consciousness quickly without postictal symptoms. Pt went to Cardiologist Dr. Nathan who recommended pt to come to ED. No fever, cp, abd pain, n/v. Leg swelling is improved from prior. Pt on torsemide 40mg which she has been taking. Pt was discharged on 3LNC which she is currently on. Patient reports she did not take Farxiga that she was discharged on as she was concerned about side effects.     In ED, patient was found afebrile, hemodynamically stable, breathing well on 3L NC. Initial labs notable for mild hyponatremia, DUNCAN on CKD, elevated proBNP and elevated pCO2 both improved from prior.  pt also noticed with abn creatinine. had severe AS had ct scan with IV contrast had contrast nephropathy and hypotension ---> CRRT required       1- CKD IV  2- CHF   3- lymphedema   4- severe AS  s/p tavr 4/24  5- hypotension orthostatic       creatinine is overall steady in this range  not in overt chf at present  hold diuretics   orthostatic hypotension,   continue midodrine 15 mg tid,  northera  increase to 600 mg TID   florinef 0.2mg bid   continue to check orthostatic bp  also PT for conditioning   anemia, hb higher  now off epogen    non-oliguric,   hardy re inserted for urinary retention  UTI abxc per primary team ceftriaxone 1G daily x 7days,  5/29  strict I/O

## 2024-05-31 NOTE — PROGRESS NOTE ADULT - SUBJECTIVE AND OBJECTIVE BOX
[facility] - Howard Kimble, Carlos Luong, Herman Alston  Office Number:  [phone]    Patient resting comfortably in bed in NAD.  Laying flat with no respiratory distress.  No complaints of chest pain, dyspnea, palpitations, PND, or orthopnea.    ROS: negative unless otherwise mentioned.    Telemetry:  sr    MEDICATIONS  (STANDING):  ammonium lactate 12% Lotion 1 Application(s) Topical <User Schedule>  apixaban 5 milliGRAM(s) Oral two times a day  aspirin  chewable 81 milliGRAM(s) Oral daily  atorvastatin 80 milliGRAM(s) Oral at bedtime  calamine/zinc oxide Lotion 1 Application(s) Topical three times a day  cefTRIAXone   IVPB 1000 milliGRAM(s) IV Intermittent every 24 hours  dextrose 10% Bolus 125 milliLiter(s) IV Bolus once  dextrose 5%. 1000 milliLiter(s) (50 mL/Hr) IV Continuous <Continuous>  dextrose 5%. 1000 milliLiter(s) (100 mL/Hr) IV Continuous <Continuous>  dextrose 50% Injectable 25 Gram(s) IV Push once  dextrose 50% Injectable 12.5 Gram(s) IV Push once  droxidopa 400 milliGRAM(s) Oral three times a day  ferrous    sulfate 325 milliGRAM(s) Oral two times a day  fludroCORTISONE 0.2 milliGRAM(s) Oral every 12 hours  glucagon  Injectable 1 milliGRAM(s) IntraMuscular once  insulin glargine Injectable (LANTUS) 12 Unit(s) SubCutaneous at bedtime  insulin lispro (ADMELOG) corrective regimen sliding scale   SubCutaneous three times a day before meals  insulin lispro (ADMELOG) corrective regimen sliding scale   SubCutaneous <User Schedule>  insulin lispro Injectable (ADMELOG) 6 Unit(s) SubCutaneous before breakfast  insulin lispro Injectable (ADMELOG) 4 Unit(s) SubCutaneous before dinner  insulin lispro Injectable (ADMELOG) 5 Unit(s) SubCutaneous before lunch  levothyroxine 75 MICROGram(s) Oral daily  midodrine. 15 milliGRAM(s) Oral three times a day  Nephro-molly 1 Tablet(s) Oral daily  pantoprazole    Tablet 40 milliGRAM(s) Oral every 24 hours  triamcinolone 0.1% Ointment 1 Application(s) Topical two times a day    MEDICATIONS  (PRN):  dextrose Oral Gel 15 Gram(s) Oral once PRN Blood Glucose LESS THAN 70 milliGRAM(s)/deciliter      Allergies    contrast media (iodine-based) (Fever; Vomiting; Flushing; Hypotension; Rash; Stomach Upset)  Ativan (Rash; Urticaria)  penicillins (Hives (Mod to Severe); Anaphylaxis (Mod to Severe); Short breath (Mod to Severe); Angioedema (Mod to Severe))    Intolerances        Vital Signs Last 24 Hrs  T(C): 36.8 (31 May 2024 04:31), Max: 36.9 (30 May 2024 11:42)  T(F): 98.2 (31 May 2024 04:31), Max: 98.5 (30 May 2024 11:42)  HR: 78 (31 May 2024 04:31) (64 - 78)  BP: 120/64 (31 May 2024 04:31) (108/63 - 129/65)  BP(mean): --  RR: 18 (31 May 2024 04:31) (18 - 18)  SpO2: 97% (31 May 2024 04:31) (96% - 97%)    Parameters below as of 31 May 2024 04:31  Patient On (Oxygen Delivery Method): room air        I&O's Summary    30 May 2024 07:01  -  31 May 2024 07:00  --------------------------------------------------------  IN: 550 mL / OUT: 1005 mL / NET: -455 mL        ON EXAM:    General: NAD, awake and alert, oriented x 3  HEENT: Mucous membranes are moist, anicteric  Lungs: Non-labored, breath sounds are clear bilaterally, No wheezing, rales or rhonchi  Cardiovascular: Regular, S1 and S2, 2/6 harsh systolic murmur best heard RUSB  Gastrointestinal: Bowel Sounds present, soft, nontender.   Lymph: chronic peripheral edema. No lymphadenopathy.    LABS: All Labs Reviewed:                        10.5   9.32  )-----------( 184      ( 30 May 2024 06:51 )             35.6                         9.7    7.37  )-----------( 182      ( 29 May 2024 07:02 )             31.8     30 May 2024 06:51    135    |  100    |  39     ----------------------------<  275    4.8     |  17     |  2.50   29 May 2024 07:02    134    |  103    |  33     ----------------------------<  163    4.8     |  19     |  2.39     Ca    8.4        30 May 2024 06:51  Ca    8.3        29 May 2024 07:02  Phos  3.5       30 May 2024 06:51  Phos  4.0       29 May 2024 07:02  Mg     2.0       30 May 2024 06:51  Mg     2.0       29 May 2024 07:02            Blood Culture:

## 2024-05-31 NOTE — PROGRESS NOTE ADULT - SUBJECTIVE AND OBJECTIVE BOX
[facility]   [phone]  RENAL FOLLOW UP NOTE  --------------------------------------------------------------------------------  Chief Complaint:    24 hour events/subjective:    seen earlier  states feels better today     PAST HISTORY  --------------------------------------------------------------------------------  No significant changes to PMH, PSH, FHx, SHx, unless otherwise noted    ALLERGIES & MEDICATIONS  --------------------------------------------------------------------------------  Allergies    contrast media (iodine-based) (Fever; Vomiting; Flushing; Hypotension; Rash; Stomach Upset)  Ativan (Rash; Urticaria)  penicillins (Hives (Mod to Severe); Anaphylaxis (Mod to Severe); Short breath (Mod to Severe); Angioedema (Mod to Severe))    Intolerances      Standing Inpatient Medications  ammonium lactate 12% Lotion 1 Application(s) Topical <User Schedule>  apixaban 5 milliGRAM(s) Oral two times a day  aspirin  chewable 81 milliGRAM(s) Oral daily  atorvastatin 80 milliGRAM(s) Oral at bedtime  calamine/zinc oxide Lotion 1 Application(s) Topical three times a day  cefTRIAXone   IVPB 1000 milliGRAM(s) IV Intermittent every 24 hours  dextrose 10% Bolus 125 milliLiter(s) IV Bolus once  dextrose 5%. 1000 milliLiter(s) IV Continuous <Continuous>  dextrose 5%. 1000 milliLiter(s) IV Continuous <Continuous>  dextrose 50% Injectable 25 Gram(s) IV Push once  dextrose 50% Injectable 12.5 Gram(s) IV Push once  droxidopa 400 milliGRAM(s) Oral three times a day  ferrous    sulfate 325 milliGRAM(s) Oral two times a day  fludroCORTISONE 0.2 milliGRAM(s) Oral every 12 hours  glucagon  Injectable 1 milliGRAM(s) IntraMuscular once  insulin glargine Injectable (LANTUS) 15 Unit(s) SubCutaneous at bedtime  insulin lispro (ADMELOG) corrective regimen sliding scale   SubCutaneous <User Schedule>  insulin lispro (ADMELOG) corrective regimen sliding scale   SubCutaneous three times a day before meals  insulin lispro Injectable (ADMELOG) 8 Unit(s) SubCutaneous three times a day before meals  levothyroxine 75 MICROGram(s) Oral daily  midodrine. 15 milliGRAM(s) Oral three times a day  Nephro-molly 1 Tablet(s) Oral daily  pantoprazole    Tablet 40 milliGRAM(s) Oral every 24 hours  triamcinolone 0.1% Ointment 1 Application(s) Topical two times a day    PRN Inpatient Medications  dextrose Oral Gel 15 Gram(s) Oral once PRN      REVIEW OF SYSTEMS  --------------------------------------------------------------------------------    Gen: denies  fevers/chills,  CVS: denies chest pain/palpitations  Resp: denies SOB/Cough  GI: Denies N/V/Abd pain  : Denies dysuria    VITALS/PHYSICAL EXAM  --------------------------------------------------------------------------------  T(C): 37 (05-31-24 @ 12:45), Max: 37 (05-31-24 @ 12:45)  HR: 80 (05-31-24 @ 13:30) (64 - 80)  BP: 136/77 (05-31-24 @ 13:30) (120/64 - 136/77)  RR: 18 (05-31-24 @ 12:45) (18 - 18)  SpO2: 100% (05-31-24 @ 13:30) (97% - 100%)  Wt(kg): --        05-30-24 @ 07:01  -  05-31-24 @ 07:00  --------------------------------------------------------  IN: 550 mL / OUT: 1005 mL / NET: -455 mL    05-31-24 @ 07:01  -  05-31-24 @ 17:47  --------------------------------------------------------  IN: 720 mL / OUT: 450 mL / NET: 270 mL      Physical Exam:  	       Gen: comfortable appearing   	Pulm: decrease bs  no rales or ronchi or wheezing  	CV: No JVD. RRR, S1S2; no rub II/VI M   	Abd: +BS, soft, nontender/nondistended, obese   	UE: Warm, no cyanosis  no clubbing, no edema  	LE: Warm, no cyanosis  no clubbing, 2 -  edema, B/L ACE bandage  	Neuro: alert and oriented       LABS/STUDIES  --------------------------------------------------------------------------------              10.5   9.32  >-----------<  184      [05-30-24 @ 06:51]              35.6     135  |  100  |  39  ----------------------------<  275      [05-30-24 @ 06:51]  4.8   |  17  |  2.50        Ca     8.4     [05-30-24 @ 06:51]      Mg     2.0     [05-30-24 @ 06:51]      Phos  3.5     [05-30-24 @ 06:51]            Creatinine Trend:  SCr 2.50 [05-30 @ 06:51]  SCr 2.39 [05-29 @ 07:02]  SCr 2.40 [05-28 @ 06:40]  SCr 2.17 [05-27 @ 10:25]  SCr 2.20 [05-26 @ 07:06]        PTH -- (Ca 8.4)      [04-19-24 @ 17:54]   109  TSH 10.20      [05-28-24 @ 06:42]  Lipid: chol 74, TG 70, HDL 33, LDL --      [04-13-24 @ 07:05]

## 2024-05-31 NOTE — PROGRESS NOTE ADULT - ASSESSMENT
73F w HFrEF (EF 30%), mod AS, known LBBB, HTN, IDDM1, CKD, hypothyroidism, chronic lymphedema, p/w 1 episode of syncope with R arm jerking.     #Syncope-Aortic Stenosis  - Known Aortic Stenosis likely Severe in setting of decreased LVEF  - s/p tavr on 4/24 c/b VT -> shock -> VFib -> shock  - hypoxic/congested, and was intubated, now extubated on 4/25 in the evening, on NC   - on 4/25 with worsening bradycardia, and now s/p TVP placement followed by AV Micra placement with removal of TVP  - Remains in Sinus Rhythm/known lbbb with intermittent   - pressors off and now on high dose midodrine, droxidopa and florinef in the setting of orthostasis. Droxidopa increased.   - Repeat TTE on 5/21 with global LV dysfunction severely decreased. Well seated TAVR   - to consider CRT-D in the future  - Cannot initiate GDMT due to orthostasis  - would try to mobilize as best as possible and monitor orthostatics, hopefully standing bp can be kept >100. BP did not drop when sitting up as of 5/31.  - cont asa and statin  - Hgb stable this AM    #Chronic Systolic HF (EF 30%), mod to severe AS, HTN, LBBB, Lymphedema   - Has known systolic HF and AS.    - Repeat TTE showed EF 30%, mild-mod LVH, mildly reduced RVF, mod-severe AS (.61 cmsq), mod pHTN  - On home Torsemide 20 mg daily, Eplerenone 25 mg daily, and Toprol  mg daily, all currently on hold  - On Midodrine 15mg TID for orthostatic hypotension  - Droxidopa now initiated as well for significant orthostasis  - Unable to initiate GDMT at this time due to significant orthostatic hypotension  - initially CVVHD, then HD  - HD now on hold, as she is urinating and creatinine remains stable, though slightly higher today  - prn iv bumex  - renal fu  - Diagnosed with UE DVT and now on full loading dose of Eliquis  - PT and Bed to chair as much as possible    #Orthostatic hypotension.   - Likely in setting of deconditioning.   - would attempt orthostatics daily.  Hoping to wean off midodrine in order to restart GDMT. Orthostatics improving from supine to seated, but still problematic with standing. Remains orthostatic and symptomatic on standing    #UTI  - Abx as per primary

## 2024-05-31 NOTE — PROGRESS NOTE ADULT - SUBJECTIVE AND OBJECTIVE BOX
DIABETES FOLLOW UP NOTE: Saw pt earlier today    Chief Complaint: Endocrine consult requested for management of DM    INTERVAL HX: Pt reports tolerating POs but has poor appetite so refusing meal time insulin if not eating much. BG 100s to 200s in the last 24 hours. Elevated BG likely due to UTI. No hypoglycemia. Creat in 2s.  Remains on Florinef  for orthostatic hypotension.     Review of Systems:  General: As above  Cardiovascular: No chest pain, palpitations  Respiratory: No SOB, no cough  GI: No nausea, vomiting, abdominal pain  Endocrine: No S&Sx of hypoglycemia    Allergies    contrast media (iodine-based) (Fever; Vomiting; Flushing; Hypotension; Rash; Stomach Upset)  Ativan (Rash; Urticaria)  penicillins (Hives (Mod to Severe); Anaphylaxis (Mod to Severe); Short breath (Mod to Severe); Angioedema (Mod to Severe))    Intolerances      MEDICATIONS:  atorvastatin 80 milliGRAM(s) Oral at bedtime  cefTRIAXone   IVPB 1000 milliGRAM(s) IV Intermittent every 24 hours  fludroCORTISONE 0.2 milliGRAM(s) Oral every 12 hours  insulin glargine Injectable (LANTUS) 15 Unit(s) SubCutaneous at bedtime  insulin lispro (ADMELOG) corrective regimen sliding scale   SubCutaneous three times a day before meals  insulin lispro (ADMELOG) corrective regimen sliding scale   SubCutaneous <User Schedule>  insulin lispro Injectable (ADMELOG) 8 Unit(s) SubCutaneous three times a day before meals  levothyroxine 75 MICROGram(s) Oral daily      PHYSICAL EXAM:  VITALS: T(C): 37 (05-31-24 @ 12:45)  T(F): 98.6 (05-31-24 @ 12:45), Max: 98.6 (05-31-24 @ 12:45)  HR: 80 (05-31-24 @ 13:30) (64 - 80)  BP: 136/77 (05-31-24 @ 13:30) (120/64 - 136/77)  RR:  (18 - 18)  SpO2:  (97% - 100%)  Wt(kg): --  GENERAL: Female laying in bed in NAD. Relative at bedside  Abdomen: Soft, nontender, non distended. + obesity  Extremities: Warm,+ edema in LEs with ace bandages D&I.   NEURO: A&O X3    LABS:  POCT Blood Glucose.: 190 mg/dL (05-31-24 @ 12:55)  POCT Blood Glucose.: 214 mg/dL (05-31-24 @ 08:53)  POCT Blood Glucose.: 272 mg/dL (05-31-24 @ 02:03)  POCT Blood Glucose.: 241 mg/dL (05-30-24 @ 21:14)  POCT Blood Glucose.: 282 mg/dL (05-30-24 @ 17:19)  POCT Blood Glucose.: 157 mg/dL (05-30-24 @ 12:43)  POCT Blood Glucose.: 261 mg/dL (05-30-24 @ 08:48)  POCT Blood Glucose.: 260 mg/dL (05-30-24 @ 02:28)  POCT Blood Glucose.: 354 mg/dL (05-29-24 @ 22:22)  POCT Blood Glucose.: 274 mg/dL (05-29-24 @ 21:02)  POCT Blood Glucose.: 164 mg/dL (05-29-24 @ 17:09)  POCT Blood Glucose.: 206 mg/dL (05-29-24 @ 12:43)  POCT Blood Glucose.: 193 mg/dL (05-29-24 @ 09:00)  POCT Blood Glucose.: 182 mg/dL (05-29-24 @ 02:22)                              10.5   9.32  )-----------( 184      ( 30 May 2024 06:51 )             35.6       05-30    135  |  100  |  39<H>  ----------------------------<  275<H>  4.8   |  17<L>  |  2.50<H>    eGFR: 20<L>    Ca    8.4      05-30  Mg     2.0     05-30  Phos  3.5     05-30      Thyroid Function Tests:  05-28 @ 06:42 TSH 10.20 FreeT4 1.0 T3 -- Anti TPO -- Anti Thyroglobulin Ab -- TSI --      A1C with Estimated Average Glucose Result: 7.4 % (03-30-24 @ 08:21)      Estimated Average Glucose: 166 mg/dL (03-30-24 @ 08:21)        04-13 Chol 74 Direct LDL -- LDL calculated 25 HDL 33<L> Trig 70

## 2024-05-31 NOTE — PROGRESS NOTE ADULT - ASSESSMENT
73F w/h/o of T1DM with unknown control due to CKD and anemia of chronic disease. DM c/b CKD. Also h/o HFrEF (EF 30%), mod AS, known LBBB, HTN,  hypothyroidism, chronic lymphedema, suspected OHS/LELIA on 3L NC home O2 p/w 1 episode of syncope with R arm jerking, likely 2/2 severe symptomatic AS. S/p AVR 4/24 c/b DUNCAN on CKD, fever and hypotension. Also UTI/pul edema/ sepsis and L adrenal nodule finding. Also orthostatic hypotension > now on Florinef. BG levels variable due to variable PO intake plus acute UTI. Pt also refusing insulin on and off. Instructed pt to request lower insulin doses if eating less and only refuse insulin if not eating. Pt verbalized understanding. Most BG are >180s at this time.  Adjusted insulin doses this am. BG goal 100 to 180s. No hypoglycemia.   Noted creat stable in 2s    Per endocrine attending Dr Burton> Adrenal nodule work up completed and pt will need to f/u once she is more stable as out pt.       Home regimen: Lantus 33 units qhs, Novolog based on sliding scale TIDAC normally 3-5 units  Has Dexcom G7

## 2024-05-31 NOTE — PROGRESS NOTE ADULT - PROBLEM SELECTOR PLAN 4
Patient has waxing and waning swelling of left upper extremity during hospitalization, per Catrina.  Noted on exam on 5/16.  DVT Duplex ultrasound Bilateral upper extremities and found to have left axial, brachial, and subclavian DVT on imaging 5/16.  - started heparin gtt (5/16), discontinued 5/17  - Eliquis load 10mg BID x 7 days completed (5/17 - 5/23).  - Continue Eliquis 5mg BID x 90 days for continued AC (5/24 - )  - Dermatology consult for rash rec topical steroid cream appears to be improving Patient has waxing and waning swelling of left upper extremity during hospitalization, per Catrina.  Noted on exam on 5/16.  DVT Duplex ultrasound Bilateral upper extremities and found to have left axial, brachial, and subclavian DVT on imaging 5/16.  - started heparin gtt (5/16), discontinued 5/17  - Eliquis load 10mg BID x 7 days completed (5/17 - 5/23).  - Continue Eliquis 5mg BID x 90 days for continued AC (5/24 - )  -f/u repeat LUE DUPLEX to monitor DVT  - Dermatology consult for rash rec topical steroid cream appears to be improving

## 2024-06-01 NOTE — PROGRESS NOTE ADULT - SUBJECTIVE AND OBJECTIVE BOX
INTERVAL HPI/OVERNIGHT EVENTS:  No new overnight event.  No N/V/D.  Tolerating diet.    Allergies    contrast media (iodine-based) (Fever; Vomiting; Flushing; Hypotension; Rash; Stomach Upset)  Ativan (Rash; Urticaria)  penicillins (Hives (Mod to Severe); Anaphylaxis (Mod to Severe); Short breath (Mod to Severe); Angioedema (Mod to Severe))    Intolerances    General:  No wt loss, fevers, chills, night sweats, fatigue,   Eyes:  Good vision, no reported pain  ENT:  No sore throat, pain, runny nose, dysphagia  CV:  No pain, palpitations, hypo/hypertension  Resp:  No dyspnea, cough, tachypnea, wheezing  GI:  No pain, No nausea, No vomiting, No diarrhea, No constipation, No weight loss, No fever, No pruritis, No rectal bleeding, No tarry stools, No dysphagia,  :  No pain, bleeding, incontinence, nocturia  Muscle:  No pain, weakness  Neuro:  No weakness, tingling, memory problems  Psych:  No fatigue, insomnia, mood problems, depression  Endocrine:  No polyuria, polydipsia, cold/heat intolerance  Heme:  No petechiae, ecchymosis, easy bruisability  Skin:  No rash, tattoos, scars, edema      PHYSICAL EXAM:   Vital Signs:  Vital Signs Last 24 Hrs  T(C): 36.5 (2024 04:00), Max: 36.6 (31 May 2024 20:37)  T(F): 97.7 (2024 04:00), Max: 97.8 (31 May 2024 20:37)  HR: 81 (2024 04:00) (67 - 81)  BP: 113/67 (2024 04:00) (113/67 - 138/50)  BP(mean): --  RR: 18 (2024 04:00) (18 - 18)  SpO2: 98% (2024 04:00) (98% - 100%)    Parameters below as of 2024 04:00  Patient On (Oxygen Delivery Method): room air      Daily     Daily Weight in k (2024 04:00)I&O's Summary    31 May 2024 07:01  -  2024 07:00  --------------------------------------------------------  IN: 1170 mL / OUT: 1480 mL / NET: -310 mL    2024 07:01  -  2024 13:22  --------------------------------------------------------  IN: 360 mL / OUT: 0 mL / NET: 360 mL        GENERAL:  Appears stated age, well-groomed, well-nourished, no distress  HEENT:  NC/AT,  conjunctivae clear and pink, no thyromegaly, nodules, adenopathy, no JVD, sclera -anicteric  CHEST:  Full & symmetric excursion, no increased effort, breath sounds clear  HEART:  Regular rhythm, S1, S2, no murmur/rub/S3/S4, no abdominal bruit, no edema  ABDOMEN:  Soft, non-tender, non-distended, normoactive bowel sounds,  no masses ,no hepato-splenomegaly, no signs of chronic liver disease  EXTEREMITIES:  no cyanosis,clubbing or edema  SKIN:  No rash/erythema/ecchymoses/petechiae/wounds/abscess/warm/dry  NEURO:  Alert, oriented, no asterixis, no tremor, no encephalopathy      LABS:                        10.4   8.42  )-----------( 186      ( 2024 12:06 )             33.5     06-01    133<L>  |  101  |  34<H>  ----------------------------<  247<H>  3.9   |  20<L>  |  1.95<H>    Ca    8.4      2024 12:06  Phos  3.6     06-01  Mg     1.8     06-01        Urinalysis Basic - ( 2024 12:06 )    Color: x / Appearance: x / SG: x / pH: x  Gluc: 247 mg/dL / Ketone: x  / Bili: x / Urobili: x   Blood: x / Protein: x / Nitrite: x   Leuk Esterase: x / RBC: x / WBC x   Sq Epi: x / Non Sq Epi: x / Bacteria: x      amylase   lipase  RADIOLOGY & ADDITIONAL TESTS:

## 2024-06-01 NOTE — PROGRESS NOTE ADULT - TIME BILLING
- Review of records, telemetry, vital signs and daily labs.   - General and cardiovascular physical examination.  - Generation of cardiovascular treatment plan.  - Coordination of care.      Patient was seen and examined by me on  06/01/2024,interim events noted,labs and radiology studies reviewed.  Cuco Tubbs MD,FACC.  [address] [place], [place] [id number].  [phone]

## 2024-06-01 NOTE — PROGRESS NOTE ADULT - ATTENDING COMMENTS
73F PMH: HFrEF (EF 30%), AS, LBBB, HTN, DM1, CKD, hypothyroidism, chronic lymphedema, LELIA (3L home O2) p/w for syncope 2/2 severe AS, s/p TAVR 4/24. Course complicated by contrast induced allergic reaction (had CTA for TAVR eval) and contrast related renal failure (acute on chronic) requiring HD. TAVR c/b VT and vfib requiring shock and flash pulmonary edema causing Acute hypoxic respiratory failure  requiring intubation.  Course complicated by contrast induced allergic reaction (had CTA for TAVR eval) and contrast related renal failure (acute on chronic) requiring CRRT for fluid removal and pressors for vasoplegia and hypovolemia due to CRRT. Patient extubated, now off pressors, CRRT.     1. Orthostatic hypotension- droxydopa, midodrine with florinef. patient's orthostatics still positive but improving.   2. UTI with urinary retention- placed on CTX- Showing E.Coli and Enterococcus faecalis. On CTX add E faecalis abx pending sensitivities   3. Left UE DVT- c/w eliquis. Repeat US showing persistent DVT noted in the left subclavian and axillary veins. Continue to monitor for progression of DVT  4. T1DM- uncontrolled with intermittent episodes of labile BS- . Being managed by endocrinology. Currently on Lantus and Admelog.   5. DUNCAN on CKD stage 4- cr stable, Continue to trend Cr   6. HFrEF- currently GDMT is on hold secondary to orthostatic hypotension- resume as tolerated

## 2024-06-01 NOTE — PROGRESS NOTE ADULT - SUBJECTIVE AND OBJECTIVE BOX
MR#[id number]  PATIENT NAME:JOHNATHAN LOPEZ    DATE OF SERVICE: 06-01-24 @ 07:55  Patient was seen and examined by Cuco Tubbs MD on    06-01-24 @ 07:55 .  Interim events noted.Consultant notes ,Labs,Telemetry reviewed by me         Covering for [facility] -Howard Sterling, Carlos Luong, Herman Alston  Office Number: [phone]         HOSPITAL COURSE: HPI:  73F w HFrEF (EF 30%), mod AS, known LBBB, HTN, IDDM1, CKD, hypothyroidism, chronic lymphedema, suspected OHS/LELIA on 3L NC home O2 p/w 1 episode of syncope. Recent admission at [facility] (3/29-4/5) for ADHF and DUNCAN s/p diuresis, discharged with new home O2 3L NC suspecting OHS/LELIA pending outpatient w/u. Since discharge a week ago, she has been staying at home with   Pt had sob walking to car. Once in car, she was still breathing heavily. Partner noticed pt was not responding to verbal stimuli for 10-15sec and witnessed R arm jerking. Pt gained consciousness quickly without postictal symptoms. Pt went to Cardiologist Dr. Nathna who recommended pt to come to ED. No fever, cp, abd pain, n/v. Leg swelling is improved from prior. Pt on torsemide 40mg which she has been taking. Pt was discharged on 3LNC which she is currently on. Patient reports she did not take Farxiga that she was discharged on as she was concerned about side effects.     In ED, patient was found afebrile, hemodynamically stable, breathing well on 3L NC. Initial labs notable for mild hyponatremia, DUNCAN on CKD, elevated proBNP and elevated pCO2 both improved from prior. (12 Apr 2024 16:31)        INTERIM EVENTS:Patient seen at bedside ,interim events noted.  s/p TAVR 4/24 c/b by VT and Vfib requiring shock and flash pulmonary edema requiring intubation.   Course complicated by contrast induced allergic reaction (had CTA for TAVR eval) and contrast related renal failure (acute on chronic) requiring CRRT for fluid removal and pressors for vasoplegia and hypovolemia due to CRRT.   Patient now extubated, off pressors, CRRT and downgraded to medicine floors.    Patient had maroon-colored stool on 5/24, positive for blood.  Hemodynamically stable with stable hemoglobin.  RESOLVED  6/1-Awake       PMH -reviewed admission note, no change since admission    MEDICATIONS  (STANDING):  ammonium lactate 12% Lotion 1 Application(s) Topical <User Schedule>  apixaban 5 milliGRAM(s) Oral two times a day  aspirin  chewable 81 milliGRAM(s) Oral daily  atorvastatin 80 milliGRAM(s) Oral at bedtime  calamine/zinc oxide Lotion 1 Application(s) Topical three times a day  cefTRIAXone   IVPB 1000 milliGRAM(s) IV Intermittent every 24 hours  droxidopa 600 milliGRAM(s) Oral three times a day  ferrous    sulfate 325 milliGRAM(s) Oral two times a day  fludroCORTISONE 0.2 milliGRAM(s) Oral every 12 hours  glucagon  Injectable 1 milliGRAM(s) IntraMuscular once  insulin glargine Injectable (LANTUS) 15 Unit(s) SubCutaneous at bedtime  insulin lispro (ADMELOG) corrective regimen sliding scale   SubCutaneous three times a day before meals  insulin lispro (ADMELOG) corrective regimen sliding scale   SubCutaneous <User Schedule>  insulin lispro Injectable (ADMELOG) 8 Unit(s) SubCutaneous three times a day before meals  levothyroxine 75 MICROGram(s) Oral daily  midodrine. 15 milliGRAM(s) Oral three times a day  Nephro-molly 1 Tablet(s) Oral daily  pantoprazole    Tablet 40 milliGRAM(s) Oral every 24 hours  triamcinolone 0.1% Ointment 1 Application(s) Topical two times a day    MEDICATIONS  (PRN):  dextrose Oral Gel 15 Gram(s) Oral once PRN Blood Glucose LESS THAN 70 milliGRAM(s)/deciliter            REVIEW OF SYSTEMS:  Constitutional: [ ] fever, [ ]weight loss,  [xfatigue [ ]weight gain  Eyes: [ ] visual changes  Respiratory: [ ]shortness of breath;  [ ] cough, [ ]wheezing, [ ]chills, [ ]hemoptysis  Cardiovascular: [ ] chest pain, [ ]palpitations, [ ]dizziness,  [ ]leg swelling[ ]orthopnea[ ]PND  Gastrointestinal: [ ] abdominal pain, [ ]nausea, [ ]vomiting,  [ ]diarrhea [ ]Constipation [ ]Melena  Genitourinary: [ ] dysuria, [ ] hematuria [ ]De La Garza  Neurologic: [ ] headaches [ ] tremors[ ]weakness [ ]Paralysis Right[ ] Left[ ]  Skin: [ ] itching, [ ]burning, [ ] rashes  Endocrine: [ ] heat or cold intolerance  Musculoskeletal: [ ] joint pain or swelling; [ ] muscle, back, or extremity pain  Psychiatric: [ ] depression, [ ]anxiety, [ ]mood swings, or [ ]difficulty sleeping  Hematologic: [ ] easy bruising, [ ] bleeding gums    [ ] All remaining systems negative except as per above.   [ ]Unable to obtain.  [x] No change in ROS since admission      Vital Signs Last 24 Hrs  T(C): 36.5 (01 Jun 2024 04:00), Max: 37 (31 May 2024 12:45)  T(F): 97.7 (01 Jun 2024 04:00), Max: 98.6 (31 May 2024 12:45)  HR: 81 (01 Jun 2024 04:00) (67 - 81)  BP: 113/67 (01 Jun 2024 04:00) (113/67 - 138/50)  RR: 18 (01 Jun 2024 04:00) (18 - 18)  SpO2: 98% (01 Jun 2024 04:00) (98% - 100%)    Parameters below as of 01 Jun 2024 04:00  Patient On (Oxygen Delivery Method): room air      I&O's Summary    31 May 2024 07:01  -  01 Jun 2024 07:00  --------------------------------------------------------  IN: 1170 mL / OUT: 1480 mL / NET: -310 mL        PHYSICAL EXAM:  General: No acute distress BMI-34  HEENT: EOMI, PERRL  Neck: Supple, [ ] JVD  Lungs: Equal air entry bilaterally; [ ] rales [ ] wheezing [ ] rhonchi  Heart: Regular rate and rhythm; [x ] murmur   2/6 [ x] systolic [ ] diastolic [ ] radiation[ ] rubs [ ]  gallops  Abdomen: Nontender, bowel sounds present  Extremities: No clubbing, cyanosis, [xx ] edema [ ]Pulses  equal and intact  Nervous system:  Alert & Oriented X3, no focal deficits  Psychiatric: Normal affect  Skin: No rashes or lesions    LABS:        Creatinine Trend: 2.50<--, 2.39<--, 2.40<--, 2.17<--, 2.20<--, 2.00<--    VA Duplex Upper Ext Vein Scan, Left (05.31.24 @ 17:07) >    IMPRESSION:  Persistent DVT noted in the left subclavian and axillary veins.      : TTE W or WO Ultrasound Enhancing Agent (05.21.24 @ 16:05) >  CONCLUSIONS:      1. Global left ventricular hypokinesis.   2. Mildly enlarged right ventricular cavity size and probably normal systolic function. Tricuspid annular plane systolic excursion (TAPSE) is 2.6 cm (normal >=1.7 cm).   3. A Benitez Bakari (TAVR) valve replacement is present in the aortic position The prosthetic valve iswell seated with normal function. No intravalvular regurgitation No paravalvular regurgitation.   4. Compared to the transthoracic echocardiogram performed on 4/25/2024, there have been no significant interval changes.   5. Left ventricular endocardium is not well visualized; however, the left ventricular systolic function appears severely reduced.   6. Technically difficult image quality.

## 2024-06-01 NOTE — PROGRESS NOTE ADULT - PROBLEM SELECTOR PLAN 2
5/29 retaining urine requiring straight cath. On straight cath x2 urine was cloudy/milky in appearance. Hardy catheter replaced. UA with >998 WBC, LE, large blood, >36 epithelial cells concerning for UTI. She reports no other urinary sx.  -f/u UCx (+E. Coli and E. Faecium)   -c/w CTX (5/29 - )  -hardy placed 5/29. 5/29 retaining urine requiring straight cath. On straight cath x2 urine was cloudy/milky in appearance. Hardy catheter replaced. UA with >998 WBC, LE, large blood, >36 epithelial cells concerning for UTI. She reports no other urinary sx.  UCx: E. Coli and E. Faecalis  -c/w CTX (5/29 - )  -start Vanc by level   -hardy placed 5/29. 5/29 retaining urine requiring straight cath. On straight cath x2 urine was cloudy/milky in appearance. Hardy catheter replaced. UA with >998 WBC, LE, large blood, >36 epithelial cells concerning for UTI. She reports no other urinary sx.  UCx: E. Coli and E. Faecalis  -c/w CTX (5/29 - )  -hardy placed 5/29. 5/29 retaining urine requiring straight cath. On straight cath x2 urine was cloudy/milky in appearance. Hardy catheter replaced. UA with >998 WBC, LE, large blood, >36 epithelial cells concerning for UTI. She reports no other urinary sx.  UCx: E. Coli and E. Faecalis   -will follow up sensitivities   -c/w CTX (5/29 - )  -hardy placed 5/29.

## 2024-06-01 NOTE — PROGRESS NOTE ADULT - PROBLEM SELECTOR PLAN 6
DUNCAN secondary to ATN from hypotension and contrast nephropathy. CKD stage 4. s/p CRRT and bumex drip in CICU.  De La Garza for urinary retention (placed 5/10 and replaced ~5/17).  Improving and appears stable 2.2-2.4  - nephro recs appreciated   - strict I's and O's  - renally dose meds, avoid nephrotoxic agents.

## 2024-06-01 NOTE — PROGRESS NOTE ADULT - PROBLEM SELECTOR PLAN 4
Patient has waxing and waning swelling of left upper extremity during hospitalization, per Catrina.  Noted on exam on 5/16.  DVT Duplex ultrasound Bilateral upper extremities and found to have left axial, brachial, and subclavian DVT on imaging 5/16.  - started heparin gtt (5/16), discontinued 5/17  - Eliquis load 10mg BID x 7 days completed (5/17 - 5/23).  - Continue Eliquis 5mg BID x 90 days for continued AC (5/24 - )  -repeat LUE DUPLEX  5/31 still shows Persistent DVT noted in the left subclavian and axillary veins.  - Dermatology consult for rash rec topical steroid cream appears to be improving

## 2024-06-01 NOTE — PROGRESS NOTE ADULT - SUBJECTIVE AND OBJECTIVE BOX
PROGRESS NOTE:    Angie Morgan MD  Internal Medicine PGY-3  Available on [employer] Teams      Patient is a 73y old  Female who presents with a chief complaint of Syncope (31 May 2024 17:46)      SUBJECTIVE / OVERNIGHT EVENTS: No acute overnight events. Pt seen and examined. Denies fevers, chills, CP, SOB, Abdominal pain, N/V, Constipation, Diarrhea      MEDICATIONS  (STANDING):  ammonium lactate 12% Lotion 1 Application(s) Topical <User Schedule>  apixaban 5 milliGRAM(s) Oral two times a day  aspirin  chewable 81 milliGRAM(s) Oral daily  atorvastatin 80 milliGRAM(s) Oral at bedtime  calamine/zinc oxide Lotion 1 Application(s) Topical three times a day  cefTRIAXone   IVPB 1000 milliGRAM(s) IV Intermittent every 24 hours  dextrose 10% Bolus 125 milliLiter(s) IV Bolus once  dextrose 5%. 1000 milliLiter(s) (100 mL/Hr) IV Continuous <Continuous>  dextrose 5%. 1000 milliLiter(s) (50 mL/Hr) IV Continuous <Continuous>  dextrose 50% Injectable 25 Gram(s) IV Push once  dextrose 50% Injectable 12.5 Gram(s) IV Push once  droxidopa 600 milliGRAM(s) Oral three times a day  ferrous    sulfate 325 milliGRAM(s) Oral two times a day  fludroCORTISONE 0.2 milliGRAM(s) Oral every 12 hours  glucagon  Injectable 1 milliGRAM(s) IntraMuscular once  insulin glargine Injectable (LANTUS) 15 Unit(s) SubCutaneous at bedtime  insulin lispro (ADMELOG) corrective regimen sliding scale   SubCutaneous three times a day before meals  insulin lispro (ADMELOG) corrective regimen sliding scale   SubCutaneous <User Schedule>  insulin lispro Injectable (ADMELOG) 8 Unit(s) SubCutaneous three times a day before meals  levothyroxine 75 MICROGram(s) Oral daily  midodrine. 15 milliGRAM(s) Oral three times a day  Nephro-molly 1 Tablet(s) Oral daily  pantoprazole    Tablet 40 milliGRAM(s) Oral every 24 hours  triamcinolone 0.1% Ointment 1 Application(s) Topical two times a day    MEDICATIONS  (PRN):  dextrose Oral Gel 15 Gram(s) Oral once PRN Blood Glucose LESS THAN 70 milliGRAM(s)/deciliter      I&O's Summary    31 May 2024 07:01  -  01 Jun 2024 07:00  --------------------------------------------------------  IN: 1170 mL / OUT: 950 mL / NET: 220 mL        Vital Signs Last 24 Hrs  T(C): 36.5 (01 Jun 2024 04:00), Max: 37 (31 May 2024 12:45)  T(F): 97.7 (01 Jun 2024 04:00), Max: 98.6 (31 May 2024 12:45)  HR: 81 (01 Jun 2024 04:00) (67 - 81)  BP: 113/67 (01 Jun 2024 04:00) (113/67 - 138/50)  BP(mean): --  RR: 18 (01 Jun 2024 04:00) (18 - 18)  SpO2: 98% (01 Jun 2024 04:00) (98% - 100%)    Parameters below as of 01 Jun 2024 04:00  Patient On (Oxygen Delivery Method): room air        =================PHYSICAL EXAM=================    GENERAL: NAD, lying in bed comfortably  HEAD:  Atraumatic, Normocephalic  EYES: EOMI, PERRLA, conjunctiva and sclera clear  ENT: Moist mucous membranes  NECK: Supple, No JVD  CHEST/LUNG: Clear to auscultation bilaterally; No rales, rhonchi, wheezing, or rubs. Unlabored respirations  HEART: Regular rate and rhythm; No murmurs, rubs, or gallops  ABDOMEN: Bowel sounds present; Soft, Nontender, Nondistended. No hepatomegally  EXTREMITIES:  2+ Peripheral Pulses, brisk capillary refill. No clubbing, cyanosis, or edema  NERVOUS SYSTEM:  Alert & Oriented X3, speech clear. No deficits   MSK: FROM all 4 extremities, full and equal strength  SKIN: No rashes or lesions    =================================================    LABS:                      RADIOLOGY & ADDITIONAL TESTS:    Imaging Personally Reviewed:    Consultant(s) Notes Reviewed:      Care Discussed with Consultants/Other Providers:   PROGRESS NOTE:    Angie Morgan MD  Internal Medicine PGY-3  Available on [employer] Teams      Patient is a 73y old  Female who presents with a chief complaint of Syncope (31 May 2024 17:46)      SUBJECTIVE / OVERNIGHT EVENTS: No acute overnight events. Pt seen and examined. No acute complaints at this time.  Denies fevers, chills, CP, SOB, Abdominal pain, N/V, Constipation, Diarrhea      MEDICATIONS  (STANDING):  ammonium lactate 12% Lotion 1 Application(s) Topical <User Schedule>  apixaban 5 milliGRAM(s) Oral two times a day  aspirin  chewable 81 milliGRAM(s) Oral daily  atorvastatin 80 milliGRAM(s) Oral at bedtime  calamine/zinc oxide Lotion 1 Application(s) Topical three times a day  cefTRIAXone   IVPB 1000 milliGRAM(s) IV Intermittent every 24 hours  dextrose 10% Bolus 125 milliLiter(s) IV Bolus once  dextrose 5%. 1000 milliLiter(s) (100 mL/Hr) IV Continuous <Continuous>  dextrose 5%. 1000 milliLiter(s) (50 mL/Hr) IV Continuous <Continuous>  dextrose 50% Injectable 25 Gram(s) IV Push once  dextrose 50% Injectable 12.5 Gram(s) IV Push once  droxidopa 600 milliGRAM(s) Oral three times a day  ferrous    sulfate 325 milliGRAM(s) Oral two times a day  fludroCORTISONE 0.2 milliGRAM(s) Oral every 12 hours  glucagon  Injectable 1 milliGRAM(s) IntraMuscular once  insulin glargine Injectable (LANTUS) 15 Unit(s) SubCutaneous at bedtime  insulin lispro (ADMELOG) corrective regimen sliding scale   SubCutaneous three times a day before meals  insulin lispro (ADMELOG) corrective regimen sliding scale   SubCutaneous <User Schedule>  insulin lispro Injectable (ADMELOG) 8 Unit(s) SubCutaneous three times a day before meals  levothyroxine 75 MICROGram(s) Oral daily  midodrine. 15 milliGRAM(s) Oral three times a day  Nephro-molly 1 Tablet(s) Oral daily  pantoprazole    Tablet 40 milliGRAM(s) Oral every 24 hours  triamcinolone 0.1% Ointment 1 Application(s) Topical two times a day    MEDICATIONS  (PRN):  dextrose Oral Gel 15 Gram(s) Oral once PRN Blood Glucose LESS THAN 70 milliGRAM(s)/deciliter      I&O's Summary    31 May 2024 07:01  -  01 Jun 2024 07:00  --------------------------------------------------------  IN: 1170 mL / OUT: 950 mL / NET: 220 mL        Vital Signs Last 24 Hrs  T(C): 36.5 (01 Jun 2024 04:00), Max: 37 (31 May 2024 12:45)  T(F): 97.7 (01 Jun 2024 04:00), Max: 98.6 (31 May 2024 12:45)  HR: 81 (01 Jun 2024 04:00) (67 - 81)  BP: 113/67 (01 Jun 2024 04:00) (113/67 - 138/50)  BP(mean): --  RR: 18 (01 Jun 2024 04:00) (18 - 18)  SpO2: 98% (01 Jun 2024 04:00) (98% - 100%)    Parameters below as of 01 Jun 2024 04:00  Patient On (Oxygen Delivery Method): room air        =================PHYSICAL EXAM=================    GENERAL: Older woman in NAD, lying in bed comfortably  ENT: Moist mucous membranes  NECK: Supple  CHEST/LUNG: Clear to auscultation bilaterally; No rales, rhonchi, wheezing, or rubs. Unlabored respirations  HEART: Regular rate and rhythm; No murmurs, rubs, or gallops  ABDOMEN: Bowel sounds present; Soft, Nontender, Nondistended.   EXTREMITIES:  2+ Peripheral Pulses, brisk capillary refill. No clubbing, cyanosis, or edema  NERVOUS SYSTEM:  Alert & Oriented X3, speech clear. No deficits   MSK: FROM all 4 extremities, full and equal strength  SKIN: diffuse erythematous rash on chest, neck, upper extremity       =================================================    LABS:                      RADIOLOGY & ADDITIONAL TESTS:    Imaging Personally Reviewed:    Consultant(s) Notes Reviewed:      Care Discussed with Consultants/Other Providers:

## 2024-06-01 NOTE — PROGRESS NOTE ADULT - ASSESSMENT
73F w HFrEF (EF 30%), mod AS, known LBBB, HTN, IDDM1, CKD, hypothyroidism, chronic lymphedema, p/w 1 episode of syncope with R arm jerking.     #Syncope-Aortic Stenosis  - Known Aortic Stenosis likely Severe in setting of decreased LVEF  - s/p tavr on 4/24 c/b VT -> shock -> VFib -> shock  - hypoxic/congested, and was intubated, now extubated on 4/25 in the evening, on NC   - on 4/25 with worsening bradycardia, and now s/p TVP placement followed by AV Micra placement with removal of TVP  - Remains in Sinus Rhythm/known lbbb with intermittent   - pressors off and now on high dose midodrine, droxidopa and florinef in the setting of orthostasis. Droxidopa increased.   - Repeat TTE on 5/21 with global LV dysfunction severely decreased. Well seated TAVR   - to consider CRT-D in the future  - Cannot initiate GDMT due to orthostasis  - would try to mobilize as best as possible and monitor orthostatics, hopefully standing bp can be kept >100.   - BP did not drop when sitting up as of 5/31.  - cont asa and statin  - Hgb stable       #Chronic Systolic HF (EF 30%), mod to severe AS, HTN, LBBB, Lymphedema   - Has known systolic HF and AS.    - Repeat TTE showed EF 30%, mild-mod LVH, mildly reduced RVF, mod-severe AS (.61 cmsq), mod pHTN  - On home Torsemide 20 mg daily, Eplerenone 25 mg daily, and Toprol  mg daily, all currently on hold  - On Midodrine 15mg TID for orthostatic hypotension  - Droxidopa now initiated as well for significant orthostasis  - Unable to initiate GDMT at this time due to significant orthostatic hypotension  - initially CVVHD, then HD  - HD now on hold, as she is urinating and creatinine remains stable, though slightly higher today  - prn iv bumex  - renal fu  - Diagnosed with UE DVT and now on full loading dose of Eliquis  - PT and Bed to chair as much as possible    #Orthostatic hypotension.   - Likely in setting of deconditioning.   - would attempt orthostatics daily.  Hoping to wean off midodrine in order to restart GDMT. Orthostatics improving from supine to seated, but still problematic with standing. Remains orthostatic and symptomatic on standing  -Repeat othostatics-still positive    #UTI  - Abx as per primary

## 2024-06-01 NOTE — PROGRESS NOTE ADULT - ASSESSMENT
73F w HFrEF (EF 30%), mod AS, known LBBB, HTN, IDDM1, CKD, hypothyroidism, chronic lymphedema, suspected OHS/LELIA on 3L NC home O2 p/w 1 episode of syncope. Recent admission at [facility] (3/29-4/5) for ADHF and DUNCAN s/p diuresis, discharged with new home O2 3L NC suspecting OHS/LELIA pending outpatient w/u. Since discharge a week ago, she has been staying at home with   Pt had sob walking to car. Once in car, she was still breathing heavily. Partner noticed pt was not responding to verbal stimuli for 10-15sec and witnessed R arm jerking. Pt gained consciousness quickly without postictal symptoms. Pt went to Cardiologist Dr. Nathan who recommended pt to come to ED. No fever, cp, abd pain, n/v. Leg swelling is improved from prior. Pt on torsemide 40mg which she has been taking. Pt was discharged on 3LNC which she is currently on. Patient reports she did not take Farxiga that she was discharged on as she was concerned about side effects.     In ED, patient was found afebrile, hemodynamically stable, breathing well on 3L NC. Initial labs notable for mild hyponatremia, DUNCAN on CKD, elevated proBNP and elevated pCO2 both improved from prior.  pt also noticed with abn creatinine. had severe AS had ct scan with IV contrast had contrast nephropathy and hypotension ---> CRRT required       1- CKD IV  2- CHF   3- lymphedema   4- severe AS  s/p tavr 4/24  5- hypotension orthostatic       creatinine is overall steady in this range  her edema has been worsening  to decrease florinef 0.1 mg bid   orthostatic hypotension,   continue midodrine 15 mg tid,  northera  600 mg TID   florinef 0.2mg bid   continue to check orthostatic bp  also PT for conditioning   anemia, hb higher  now off epogen    non-oliguric,   hardy re inserted for urinary retention  UTI abxc per primary team ceftriaxone 1G daily x 7days,  5/29  strict I/O

## 2024-06-01 NOTE — PROGRESS NOTE ADULT - SUBJECTIVE AND OBJECTIVE BOX
[facility]   [phone]  RENAL FOLLOW UP NOTE  --------------------------------------------------------------------------------  Chief Complaint:    24 hour events/subjective:    seen earlier   states was able to sit at edge of be yesterday   denies sob   c/o UE edema     PAST HISTORY  --------------------------------------------------------------------------------  No significant changes to PMH, PSH, FHx, SHx, unless otherwise noted    ALLERGIES & MEDICATIONS  --------------------------------------------------------------------------------  Allergies    contrast media (iodine-based) (Fever; Vomiting; Flushing; Hypotension; Rash; Stomach Upset)  Ativan (Rash; Urticaria)  penicillins (Hives (Mod to Severe); Anaphylaxis (Mod to Severe); Short breath (Mod to Severe); Angioedema (Mod to Severe))    Intolerances      Standing Inpatient Medications  ammonium lactate 12% Lotion 1 Application(s) Topical <User Schedule>  apixaban 5 milliGRAM(s) Oral two times a day  aspirin  chewable 81 milliGRAM(s) Oral daily  atorvastatin 80 milliGRAM(s) Oral at bedtime  calamine/zinc oxide Lotion 1 Application(s) Topical three times a day  dextrose 10% Bolus 125 milliLiter(s) IV Bolus once  dextrose 5%. 1000 milliLiter(s) IV Continuous <Continuous>  dextrose 5%. 1000 milliLiter(s) IV Continuous <Continuous>  dextrose 50% Injectable 25 Gram(s) IV Push once  dextrose 50% Injectable 12.5 Gram(s) IV Push once  droxidopa 600 milliGRAM(s) Oral three times a day  ferrous    sulfate 325 milliGRAM(s) Oral two times a day  fludroCORTISONE 0.2 milliGRAM(s) Oral every 12 hours  glucagon  Injectable 1 milliGRAM(s) IntraMuscular once  insulin glargine Injectable (LANTUS) 15 Unit(s) SubCutaneous at bedtime  insulin lispro (ADMELOG) corrective regimen sliding scale   SubCutaneous three times a day before meals  insulin lispro (ADMELOG) corrective regimen sliding scale   SubCutaneous <User Schedule>  insulin lispro Injectable (ADMELOG) 8 Unit(s) SubCutaneous three times a day before meals  levothyroxine 75 MICROGram(s) Oral daily  midodrine. 15 milliGRAM(s) Oral three times a day  Nephro-molly 1 Tablet(s) Oral daily  pantoprazole    Tablet 40 milliGRAM(s) Oral every 24 hours  triamcinolone 0.1% Ointment 1 Application(s) Topical two times a day    PRN Inpatient Medications  dextrose Oral Gel 15 Gram(s) Oral once PRN      REVIEW OF SYSTEMS  --------------------------------------------------------------------------------    Gen: denies  fevers/chills,  CVS: denies chest pain/palpitations  Resp: denies SOB/Cough  GI: Denies N/V/Abd pain  : Denies dysuria    VITALS/PHYSICAL EXAM  --------------------------------------------------------------------------------  T(C): 36.6 (06-01-24 @ 16:00), Max: 36.6 (05-31-24 @ 20:37)  HR: 81 (06-01-24 @ 04:00) (67 - 81)  BP: 113/67 (06-01-24 @ 04:00) (113/67 - 138/50)  RR: 19 (06-01-24 @ 16:00) (18 - 19)  SpO2: 97% (06-01-24 @ 16:00) (97% - 100%)  Wt(kg): --        05-31-24 @ 07:01  -  06-01-24 @ 07:00  --------------------------------------------------------  IN: 1170 mL / OUT: 1480 mL / NET: -310 mL    06-01-24 @ 07:01  -  06-01-24 @ 20:08  --------------------------------------------------------  IN: 720 mL / OUT: 600 mL / NET: 120 mL      Physical Exam:  	      Gen: comfortable appearing   	Pulm: decrease bs  no rales or ronchi or wheezing  	CV: No JVD. RRR, S1S2; no rub II/VI M   	Abd: +BS, soft, nontender/nondistended, obese   	UE: Warm, no cyanosis  no clubbing, no edema  	LE: Warm, no cyanosis  no clubbing, 2 -  edema, B/L ACE bandage  	Neuro: alert and oriented     LABS/STUDIES  --------------------------------------------------------------------------------              10.4   8.42  >-----------<  186      [06-01-24 @ 12:06]              33.5     133  |  101  |  34  ----------------------------<  247      [06-01-24 @ 12:06]  3.9   |  20  |  1.95        Ca     8.4     [06-01-24 @ 12:06]      Mg     1.8     [06-01-24 @ 12:06]      Phos  3.6     [06-01-24 @ 12:06]            Creatinine Trend:  SCr 1.95 [06-01 @ 12:06]  SCr 2.50 [05-30 @ 06:51]  SCr 2.39 [05-29 @ 07:02]  SCr 2.40 [05-28 @ 06:40]  SCr 2.17 [05-27 @ 10:25]      PTH -- (Ca 8.4)      [04-19-24 @ 17:54]   109  TSH 10.20      [05-28-24 @ 06:42]  Lipid: chol 74, TG 70, HDL 33, LDL --      [04-13-24 @ 07:05]

## 2024-06-01 NOTE — PROGRESS NOTE ADULT - PROBLEM SELECTOR PLAN 9
TSH initially mildly elevated 4.79 - 5.06, FT4 1.3-1.5  TSH of 10.20 on 5/28, repeat T4 wnl  - c/w levothyroxine 75 daily as per endo   - Recheck TSH and FT4 outpatient

## 2024-06-02 NOTE — PROGRESS NOTE ADULT - ASSESSMENT
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 Course complicated by contrast induced allergic reaction (had CTA for TAVR eval) and contrast related renal failure (acute on chronic) requiring CRRT for fluid removal and pressors for vasoplegia and hypovolemia due to CRRT. Patient now extubated, off pressors, CRRT and downgraded to medicine floors now c/b severe orthostatic hypotension requiring midodrine, droxidopa, and fludrocortisone, as well as melena which has resolved.

## 2024-06-02 NOTE — PROGRESS NOTE ADULT - ATTENDING COMMENTS
73F PMH: HFrEF (EF 30%), AS, LBBB, HTN, DM1, CKD, hypothyroidism, chronic lymphedema, LELIA (3L home O2) p/w for syncope 2/2 severe AS, s/p TAVR 4/24. Course complicated by contrast induced allergic reaction (had CTA for TAVR eval) and contrast related renal failure (acute on chronic) requiring HD. TAVR c/b VT and vfib requiring shock and flash pulmonary edema causing Acute hypoxic respiratory failure  requiring intubation.  Course complicated by contrast induced allergic reaction (had CTA for TAVR eval) and contrast related renal failure (acute on chronic) requiring CRRT for fluid removal and pressors for vasoplegia and hypovolemia due to CRRT. Patient extubated, now off pressors, CRRT.     1. Orthostatic hypotension- droxydopa, midodrine with florinef. patient's orthostatics still positive, symptomatic. Midodrine increased today.   2. Urinary retention- failed 3 voiding trials.   3. Left UE DVT- c/w eliquis. Repeat US showing persistent DVT noted in the left subclavian and axillary veins. ACE wrap for swelling.   4. T1DM- uncontrolled with intermittent episodes of labile BS- . Being managed by endocrinology. Currently on Lantus and Admelog.   5. DUNCAN on CKD stage 4- cr stable.  6. HFrEF- currently GDMT is on hold secondary to orthostatic hypotension.

## 2024-06-02 NOTE — PROGRESS NOTE ADULT - SUBJECTIVE AND OBJECTIVE BOX
Chief Complaint: Diabetes Mellitus follow up    INTERVAL HX:  Patient seen at bedside. She was hypoglycemic last night even though she at her whole dinner. She only wants to receive 5units of admelog.    BG over the last 24 hrs have been within goal range 100-180mg/dl except for bedtime hypoglycemia.     Review of Systems:  General: As above  GI: No nausea, vomiting  Endocrine: no  S&Sx of hypoglycemia    Allergies    contrast media (iodine-based) (Fever; Vomiting; Flushing; Hypotension; Rash; Stomach Upset)  Ativan (Rash; Urticaria)  penicillins (Hives (Mod to Severe); Anaphylaxis (Mod to Severe); Short breath (Mod to Severe); Angioedema (Mod to Severe))    Intolerances      MEDICATIONS  (STANDING):  ammonium lactate 12% Lotion 1 Application(s) Topical <User Schedule>  apixaban 5 milliGRAM(s) Oral two times a day  aspirin  chewable 81 milliGRAM(s) Oral daily  atorvastatin 80 milliGRAM(s) Oral at bedtime  calamine/zinc oxide Lotion 1 Application(s) Topical three times a day  dextrose 10% Bolus 125 milliLiter(s) IV Bolus once  dextrose 5%. 1000 milliLiter(s) (100 mL/Hr) IV Continuous <Continuous>  dextrose 5%. 1000 milliLiter(s) (50 mL/Hr) IV Continuous <Continuous>  dextrose 50% Injectable 25 Gram(s) IV Push once  dextrose 50% Injectable 12.5 Gram(s) IV Push once  droxidopa 600 milliGRAM(s) Oral three times a day  ferrous    sulfate 325 milliGRAM(s) Oral two times a day  fludroCORTISONE 0.2 milliGRAM(s) Oral every 12 hours  glucagon  Injectable 1 milliGRAM(s) IntraMuscular once  insulin glargine Injectable (LANTUS) 14 Unit(s) SubCutaneous at bedtime  insulin lispro (ADMELOG) corrective regimen sliding scale   SubCutaneous three times a day before meals  insulin lispro (ADMELOG) corrective regimen sliding scale   SubCutaneous <User Schedule>  insulin lispro Injectable (ADMELOG) 5 Unit(s) SubCutaneous three times a day before meals  levothyroxine 75 MICROGram(s) Oral daily  midodrine. 20 milliGRAM(s) Oral three times a day  Nephro-molly 1 Tablet(s) Oral daily  pantoprazole    Tablet 40 milliGRAM(s) Oral every 24 hours  triamcinolone 0.1% Ointment 1 Application(s) Topical two times a day        atorvastatin   80 milliGRAM(s) Oral (06-01-24 @ 21:39)    dextrose 50% Injectable   25 Gram(s) IV Push (06-01-24 @ 21:57)    fludroCORTISONE   0.2 milliGRAM(s) Oral (06-02-24 @ 05:34)   0.2 milliGRAM(s) Oral (06-01-24 @ 17:38)    insulin glargine Injectable (LANTUS)   7 Unit(s) SubCutaneous (06-02-24 @ 00:01)    insulin lispro (ADMELOG) corrective regimen sliding scale   1 Unit(s) SubCutaneous (06-02-24 @ 13:11)   2 Unit(s) SubCutaneous (06-02-24 @ 08:53)    insulin lispro Injectable (ADMELOG)   5 Unit(s) SubCutaneous (06-02-24 @ 13:10)    insulin lispro Injectable (ADMELOG)   8 Unit(s) SubCutaneous (06-02-24 @ 08:52)   8 Unit(s) SubCutaneous (06-01-24 @ 17:38)    levothyroxine   75 MICROGram(s) Oral (06-02-24 @ 05:34)        PHYSICAL EXAM:  VITALS: T(C): 36.9 (06-02-24 @ 12:25)  T(F): 98.4 (06-02-24 @ 12:25), Max: 98.4 (06-02-24 @ 12:25)  HR: 75 (06-02-24 @ 05:30) (75 - 77)  BP: 124/62 (06-02-24 @ 05:30) (108/65 - 124/62)  RR:  (18 - 19)  SpO2:  (97% - 98%)  Wt(kg): --  GENERAL: NAD  Respiratory: Respirations unlabored   Extremities: Warm, generalized edema  NEURO: Alert , appropriate     LABS:  POCT Blood Glucose.: 156 mg/dL (06-02-24 @ 12:48)  POCT Blood Glucose.: 216 mg/dL (06-02-24 @ 08:39)  POCT Blood Glucose.: 177 mg/dL (06-02-24 @ 02:22)  POCT Blood Glucose.: 148 mg/dL (06-02-24 @ 00:01)  POCT Blood Glucose.: 129 mg/dL (06-01-24 @ 22:15)  POCT Blood Glucose.: 49 mg/dL (06-01-24 @ 21:44)  POCT Blood Glucose.: 47 mg/dL (06-01-24 @ 21:26)  POCT Blood Glucose.: 51 mg/dL (06-01-24 @ 21:26)  POCT Blood Glucose.: 89 mg/dL (06-01-24 @ 17:05)  POCT Blood Glucose.: 146 mg/dL (06-01-24 @ 13:01)  POCT Blood Glucose.: 184 mg/dL (06-01-24 @ 09:27)  POCT Blood Glucose.: 193 mg/dL (06-01-24 @ 02:19)  POCT Blood Glucose.: 171 mg/dL (05-31-24 @ 22:00)  POCT Blood Glucose.: 287 mg/dL (05-31-24 @ 17:54)  POCT Blood Glucose.: 190 mg/dL (05-31-24 @ 12:55)  POCT Blood Glucose.: 214 mg/dL (05-31-24 @ 08:53)  POCT Blood Glucose.: 272 mg/dL (05-31-24 @ 02:03)  POCT Blood Glucose.: 241 mg/dL (05-30-24 @ 21:14)  POCT Blood Glucose.: 282 mg/dL (05-30-24 @ 17:19)                          10.4   8.11  )-----------( 166      ( 02 Jun 2024 07:05 )             34.2     06-02    138  |  104  |  32<H>  ----------------------------<  200<H>  4.5   |  18<L>  |  1.77<H>    Ca    8.5      02 Jun 2024 07:05  Phos  3.7     06-02  Mg     2.0     06-02      eGFR: 30 mL/min/1.73m2 (02 Jun 2024 07:05)    04-13 Chol 74 Direct LDL -- LDL calculated 25 HDL 33<L> Trig 70  Thyroid Function Tests:  05-28 @ 06:42 TSH 10.20 FreeT4 1.0 T3 -- Anti TPO -- Anti Thyroglobulin Ab -- TSI --      A1C with Estimated Average Glucose Result: 7.4 % (03-30-24 @ 08:21)    Estimated Average Glucose: 166 mg/dL (03-30-24 @ 08:21)        Diet, Consistent Carbohydrate w/Evening Snack:   Kwesi(7 Gm Arginine/7 Gm Glut/1.2 Gm HMB     Qty per Day:  2 (05-28-24 @ 15:11) [Active]

## 2024-06-02 NOTE — PROGRESS NOTE ADULT - PROBLEM SELECTOR PLAN 2
5/29 retaining urine requiring straight cath. On straight cath x2 urine was cloudy/milky in appearance. Hardy catheter replaced. UA with >998 WBC, LE, large blood, >36 epithelial cells concerning for UTI. She reports no other urinary sx.  UCx: E. Coli and VRE E. Faecalis   -ID consulted, recommended monitoring off abx for asymptomatic bacteriuria   -CTX (5/29 - 5/31)  -hardy placed 5/29. 5/29 retaining urine requiring straight cath. On straight cath x2 urine was cloudy/milky in appearance. Hardy catheter replaced. UA with >998 WBC, LE, large blood, >36 epithelial cells concerning for UTI. She reports no other urinary sx.  UCx: E. Coli and VRE E. Faecalis   -Per ID, recommend monitoring off abx as this is most likely asymptomatic bacteriuria   -s/p CTX (5/29 - 5/31)  -hardy placed 5/29.

## 2024-06-02 NOTE — PROGRESS NOTE ADULT - ASSESSMENT
73F w HFrEF (EF 30%), mod AS, known LBBB, HTN, IDDM1, CKD, hypothyroidism, chronic lymphedema, p/w 1 episode of syncope with R arm jerking.     #Syncope-Aortic Stenosis  - Known Aortic Stenosis likely Severe in setting of decreased LVEF  - s/p tavr on 4/24 c/b VT -> shock -> VFib -> shock  - hypoxic/congested, and was intubated, now extubated on 4/25 in the evening, on NC   - on 4/25 with worsening bradycardia, and now s/p TVP placement followed by AV Micra placement with removal of TVP  - Remains in Sinus Rhythm/known lbbb with intermittent   - pressors off and now on high dose midodrine, droxidopa and florinef in the setting of orthostasis. Droxidopa increased.   - Repeat TTE on 5/21 with global LV dysfunction severely decreased. Well seated TAVR   - to consider CRT-D in the future  - Cannot initiate GDMT due to orthostasis  - would try to mobilize as best as possible and monitor orthostatics, hopefully standing bp can be kept >100.   - BP did not drop when sitting up as of 5/31.  - cont asa and statin  - Hgb stable at 10.4      #Chronic Systolic HF (EF 30%), mod to severe AS, HTN, LBBB, Lymphedema   - Has known systolic HF and AS.    - Repeat TTE showed EF 30%, mild-mod LVH, mildly reduced RVF, mod-severe AS (.61 cmsq), mod pHTN  - On home Torsemide 20 mg daily, Eplerenone 25 mg daily, and Toprol  mg daily, all currently on hold  - On Midodrine 15mg TID for orthostatic hypotension  - Droxidopa now initiated as well for significant orthostasis upto 600mg TID  - Unable to initiate GDMT at this time due to significant orthostatic hypotension  - initially CVVHD, then HD  - HD now on hold, as she is urinating and creatinine remains stable,   - Creatinine Trend: 1.95<--, 2.50<--, 2.39<--, 2.40<--, 2.17<--, 2.20<--  - prn iv bumex  - renal fu  - Diagnosed with UE DVT and now on full loading dose of Eliquis  - PT and Bed to chair as much as possible    #Orthostatic hypotension.   - Likely in setting of deconditioning.   - would attempt orthostatics daily.  Hoping to wean off midodrine in order to restart GDMT. Orthostatics improving from supine to seated, but still problematic with standing. Remains orthostatic and symptomatic on standing  -Repeat othostatics-improved-Florinef decreased 2/2 increased edema        #UTI  - Abx as per primary   73F w HFrEF (EF 30%), mod AS, known LBBB, HTN, IDDM1, CKD, hypothyroidism, chronic lymphedema, p/w 1 episode of syncope with R arm jerking.     #Syncope-Aortic Stenosis  - Known Aortic Stenosis likely Severe in setting of decreased LVEF  - s/p tavr on 4/24 c/b VT -> shock -> VFib -> shock  - hypoxic/congested, and was intubated, now extubated on 4/25 in the evening, on NC   - on 4/25 with worsening bradycardia, and now s/p TVP placement followed by AV Micra placement with removal of TVP  - Remains in Sinus Rhythm/known lbbb with intermittent   - pressors off and now on high dose midodrine, droxidopa and florinef in the setting of orthostasis. Droxidopa increased.   - Repeat TTE on 5/21 with global LV dysfunction severely decreased. Well seated TAVR   - to consider CRT-D in the future  - Cannot initiate GDMT due to orthostasis  - would try to mobilize as best as possible and monitor orthostatics, hopefully standing bp can be kept >100.   - BP did not drop when sitting up as of 5/31.  - cont asa and statin  - Hgb stable at 10.4      #Chronic Systolic HF (EF 30%), mod to severe AS, HTN, LBBB, Lymphedema   - Has known systolic HF and AS.    - Repeat TTE showed EF 30%, mild-mod LVH, mildly reduced RVF, mod-severe AS (.61 cmsq), mod pHTN  - On home Torsemide 20 mg daily, Eplerenone 25 mg daily, and Toprol  mg daily, all currently on hold  - On Midodrine 15mg TID for orthostatic hypotension  - Droxidopa now initiated as well for significant orthostasis upto 600mg TID  - Unable to initiate GDMT at this time due to significant orthostatic hypotension  - initially CVVHD, then HD  - HD now on hold, as she is urinating and creatinine remains stable,   - Creatinine Trend: 1.95<--, 2.50<--, 2.39<--, 2.40<--, 2.17<--, 2.20<--  - prn iv bumex  - renal fu  - Diagnosed with UE DVT and now on full loading dose of Eliquis  - PT and Bed to chair as much as possible    #Orthostatic hypotension.   - Likely in setting of deconditioning.   - would attempt orthostatics daily.  Hoping to wean off midodrine in order to restart GDMT. Orthostatics improving from supine to seated, but still problematic with standing. Remains orthostatic and symptomatic on standing  -Repeat othostatics-improved-Florinef decreased 2/2 increased edema      # Atrial Fibrillation  -Episode overnight  -Is on AC  -Continue telemetry        #UTI  - Abx as per primary

## 2024-06-02 NOTE — PROGRESS NOTE ADULT - PROBLEM SELECTOR PLAN 12
DVT ppx: Eliquis  Diet: CC   Dispo: pending clinical course, PT rec [facility]. PM&R recommend ACUTE inpatient rehabilitation     AVOID QT PROLONGING AGENTS - QTc 513 6/1/24

## 2024-06-02 NOTE — PROGRESS NOTE ADULT - SUBJECTIVE AND OBJECTIVE BOX
Marcial Erickson MD  PGY 1 Department of Internal Medicine        Patient is a 73y old  Female who presents with a chief complaint of Syncope (02 Jun 2024 06:51)      SUBJECTIVE / OVERNIGHT EVENTS: Pt seen and examined. Overnight had glucose 20s, received 1/2 lantus dose andDenies fevers, chills, CP, SOB, Abdominal pain, N/V, Constipation, Diarrhea        MEDICATIONS  (STANDING):  ammonium lactate 12% Lotion 1 Application(s) Topical <User Schedule>  apixaban 5 milliGRAM(s) Oral two times a day  aspirin  chewable 81 milliGRAM(s) Oral daily  atorvastatin 80 milliGRAM(s) Oral at bedtime  calamine/zinc oxide Lotion 1 Application(s) Topical three times a day  dextrose 10% Bolus 125 milliLiter(s) IV Bolus once  dextrose 5%. 1000 milliLiter(s) (100 mL/Hr) IV Continuous <Continuous>  dextrose 5%. 1000 milliLiter(s) (50 mL/Hr) IV Continuous <Continuous>  dextrose 50% Injectable 25 Gram(s) IV Push once  dextrose 50% Injectable 12.5 Gram(s) IV Push once  droxidopa 600 milliGRAM(s) Oral three times a day  ferrous    sulfate 325 milliGRAM(s) Oral two times a day  fludroCORTISONE 0.2 milliGRAM(s) Oral every 12 hours  glucagon  Injectable 1 milliGRAM(s) IntraMuscular once  insulin lispro (ADMELOG) corrective regimen sliding scale   SubCutaneous three times a day before meals  insulin lispro (ADMELOG) corrective regimen sliding scale   SubCutaneous <User Schedule>  insulin lispro Injectable (ADMELOG) 8 Unit(s) SubCutaneous three times a day before meals  levothyroxine 75 MICROGram(s) Oral daily  midodrine. 15 milliGRAM(s) Oral three times a day  Nephro-molly 1 Tablet(s) Oral daily  pantoprazole    Tablet 40 milliGRAM(s) Oral every 24 hours  triamcinolone 0.1% Ointment 1 Application(s) Topical two times a day    MEDICATIONS  (PRN):  dextrose Oral Gel 15 Gram(s) Oral once PRN Blood Glucose LESS THAN 70 milliGRAM(s)/deciliter      I&O's Summary    01 Jun 2024 07:01  -  02 Jun 2024 07:00  --------------------------------------------------------  IN: 1670 mL / OUT: 2050 mL / NET: -380 mL        Vital Signs Last 24 Hrs  T(C): 36.7 (02 Jun 2024 05:30), Max: 36.7 (02 Jun 2024 05:30)  T(F): 98 (02 Jun 2024 05:30), Max: 98 (02 Jun 2024 05:30)  HR: 75 (02 Jun 2024 05:30) (75 - 77)  BP: 124/62 (02 Jun 2024 05:30) (108/65 - 124/62)  BP(mean): --  RR: 18 (02 Jun 2024 05:30) (18 - 19)  SpO2: 97% (02 Jun 2024 05:30) (97% - 97%)    Parameters below as of 02 Jun 2024 05:30  Patient On (Oxygen Delivery Method): room air        CAPILLARY BLOOD GLUCOSE      POCT Blood Glucose.: 177 mg/dL (02 Jun 2024 02:22)  POCT Blood Glucose.: 148 mg/dL (02 Jun 2024 00:01)  POCT Blood Glucose.: 129 mg/dL (01 Jun 2024 22:15)  POCT Blood Glucose.: 49 mg/dL (01 Jun 2024 21:44)  POCT Blood Glucose.: 47 mg/dL (01 Jun 2024 21:26)  POCT Blood Glucose.: 51 mg/dL (01 Jun 2024 21:26)  POCT Blood Glucose.: 89 mg/dL (01 Jun 2024 17:05)  POCT Blood Glucose.: 146 mg/dL (01 Jun 2024 13:01)  POCT Blood Glucose.: 184 mg/dL (01 Jun 2024 09:27)      PHYSICAL EXAM:  GENERAL: NAD,   HEAD:  Atraumatic, Normocephalic  EYES: EOMI, PERRL, conjunctiva and sclera clear  NECK: No JVD  CHEST/LUNG: Clear to auscultation bilaterally; No wheeze  HEART: Regular rate and rhythm; No murmurs, rubs, or gallops  ABDOMEN: Soft, Nontender, Nondistended; Bowel sounds present  EXTREMITIES:  2+ Peripheral Pulses, No clubbing, cyanosis, or edema  PSYCH: AAOx3  NEUROLOGY: non-focal  SKIN: No rashes or lesions       LABS:                        10.4   8.42  )-----------( 186      ( 01 Jun 2024 12:06 )             33.5     Auto Eosinophil # x     / Auto Eosinophil % x     / Auto Neutrophil # x     / Auto Neutrophil % x     / BANDS % x        06-01    133<L>  |  101  |  34<H>  ----------------------------<  247<H>  3.9   |  20<L>  |  1.95<H>    Ca    8.4      01 Jun 2024 12:06  Mg     1.8     06-01  Phos  3.6     06-01          Urinalysis Basic - ( 01 Jun 2024 12:06 )    Color: x / Appearance: x / SG: x / pH: x  Gluc: 247 mg/dL / Ketone: x  / Bili: x / Urobili: x   Blood: x / Protein: x / Nitrite: x   Leuk Esterase: x / RBC: x / WBC x   Sq Epi: x / Non Sq Epi: x / Bacteria: x            RADIOLOGY & ADDITIONAL TESTS:    Imaging Personally Reviewed:    Consultant(s) Notes Reviewed:      Care Discussed with Consultants/Other Providers:   Marcial Erickson MD  PGY 1 Department of Internal Medicine        Patient is a 73y old  Female who presents with a chief complaint of Syncope (02 Jun 2024 06:51)      SUBJECTIVE / OVERNIGHT EVENTS: Pt seen and examined. Overnight had glucose to 49, gave dextrose and 1/2 nightly lantus dose and improved to 129. Also noted to have AF vs flutter on tele, EKG ordered showing 2nd degree AV block (Mobitz 1). Denies fevers, chills, CP, SOB, Abdominal pain, N/V, Constipation, Diarrhea        MEDICATIONS  (STANDING):  ammonium lactate 12% Lotion 1 Application(s) Topical <User Schedule>  apixaban 5 milliGRAM(s) Oral two times a day  aspirin  chewable 81 milliGRAM(s) Oral daily  atorvastatin 80 milliGRAM(s) Oral at bedtime  calamine/zinc oxide Lotion 1 Application(s) Topical three times a day  dextrose 10% Bolus 125 milliLiter(s) IV Bolus once  dextrose 5%. 1000 milliLiter(s) (100 mL/Hr) IV Continuous <Continuous>  dextrose 5%. 1000 milliLiter(s) (50 mL/Hr) IV Continuous <Continuous>  dextrose 50% Injectable 25 Gram(s) IV Push once  dextrose 50% Injectable 12.5 Gram(s) IV Push once  droxidopa 600 milliGRAM(s) Oral three times a day  ferrous    sulfate 325 milliGRAM(s) Oral two times a day  fludroCORTISONE 0.2 milliGRAM(s) Oral every 12 hours  glucagon  Injectable 1 milliGRAM(s) IntraMuscular once  insulin lispro (ADMELOG) corrective regimen sliding scale   SubCutaneous three times a day before meals  insulin lispro (ADMELOG) corrective regimen sliding scale   SubCutaneous <User Schedule>  insulin lispro Injectable (ADMELOG) 8 Unit(s) SubCutaneous three times a day before meals  levothyroxine 75 MICROGram(s) Oral daily  midodrine. 15 milliGRAM(s) Oral three times a day  Nephro-molly 1 Tablet(s) Oral daily  pantoprazole    Tablet 40 milliGRAM(s) Oral every 24 hours  triamcinolone 0.1% Ointment 1 Application(s) Topical two times a day    MEDICATIONS  (PRN):  dextrose Oral Gel 15 Gram(s) Oral once PRN Blood Glucose LESS THAN 70 milliGRAM(s)/deciliter      I&O's Summary    01 Jun 2024 07:01  -  02 Jun 2024 07:00  --------------------------------------------------------  IN: 1670 mL / OUT: 2050 mL / NET: -380 mL        Vital Signs Last 24 Hrs  T(C): 36.7 (02 Jun 2024 05:30), Max: 36.7 (02 Jun 2024 05:30)  T(F): 98 (02 Jun 2024 05:30), Max: 98 (02 Jun 2024 05:30)  HR: 75 (02 Jun 2024 05:30) (75 - 77)  BP: 124/62 (02 Jun 2024 05:30) (108/65 - 124/62)  BP(mean): --  RR: 18 (02 Jun 2024 05:30) (18 - 19)  SpO2: 97% (02 Jun 2024 05:30) (97% - 97%)    Parameters below as of 02 Jun 2024 05:30  Patient On (Oxygen Delivery Method): room air        CAPILLARY BLOOD GLUCOSE      POCT Blood Glucose.: 177 mg/dL (02 Jun 2024 02:22)  POCT Blood Glucose.: 148 mg/dL (02 Jun 2024 00:01)  POCT Blood Glucose.: 129 mg/dL (01 Jun 2024 22:15)  POCT Blood Glucose.: 49 mg/dL (01 Jun 2024 21:44)  POCT Blood Glucose.: 47 mg/dL (01 Jun 2024 21:26)  POCT Blood Glucose.: 51 mg/dL (01 Jun 2024 21:26)  POCT Blood Glucose.: 89 mg/dL (01 Jun 2024 17:05)  POCT Blood Glucose.: 146 mg/dL (01 Jun 2024 13:01)  POCT Blood Glucose.: 184 mg/dL (01 Jun 2024 09:27)      PHYSICAL EXAM:  GENERAL: NAD,   HEAD:  Atraumatic, Normocephalic  EYES: EOMI, conjunctiva and sclera clear  ENMT: Moist mucous membranes  CHEST/LUNG: Clear to auscultation bilaterally; No rales, rhonchi, wheezing, or rubs  HEART: Regular rate and rhythm, systolic murmur  ABDOMEN: Soft, Nontender, Nondistended; Bowel sounds present  EXTREMITIES:  2+ Peripheral Pulses, 1-2+ b/l edema up to thighs in ace wrap  SKIN: erythematous, macular rash to anterior chest wall, neck, and upper extremities improving  NERVOUS SYSTEM:  Alert, no focal deficits  PSYCH:  Appropriate affect       LABS:                        10.4   8.42  )-----------( 186      ( 01 Jun 2024 12:06 )             33.5     Auto Eosinophil # x     / Auto Eosinophil % x     / Auto Neutrophil # x     / Auto Neutrophil % x     / BANDS % x        06-01    133<L>  |  101  |  34<H>  ----------------------------<  247<H>  3.9   |  20<L>  |  1.95<H>    Ca    8.4      01 Jun 2024 12:06  Mg     1.8     06-01  Phos  3.6     06-01          Urinalysis Basic - ( 01 Jun 2024 12:06 )    Color: x / Appearance: x / SG: x / pH: x  Gluc: 247 mg/dL / Ketone: x  / Bili: x / Urobili: x   Blood: x / Protein: x / Nitrite: x   Leuk Esterase: x / RBC: x / WBC x   Sq Epi: x / Non Sq Epi: x / Bacteria: x            RADIOLOGY & ADDITIONAL TESTS:    Imaging Personally Reviewed:    Consultant(s) Notes Reviewed:      Care Discussed with Consultants/Other Providers:   Marcial Erickson MD  PGY 1 Department of Internal Medicine        Patient is a 73y old  Female who presents with a chief complaint of Syncope (02 Jun 2024 06:51)      SUBJECTIVE / OVERNIGHT EVENTS: Pt seen and examined. Overnight had glucose to 49, gave dextrose and 1/2 nightly lantus dose and improved to 129. Also noted to have AF vs flutter on tele, EKG ordered showing 2nd degree AV block (Mobitz 1). Denies fevers, chills, CP, SOB, Abdominal pain, N/V, Constipation, Diarrhea        MEDICATIONS  (STANDING):  ammonium lactate 12% Lotion 1 Application(s) Topical <User Schedule>  apixaban 5 milliGRAM(s) Oral two times a day  aspirin  chewable 81 milliGRAM(s) Oral daily  atorvastatin 80 milliGRAM(s) Oral at bedtime  calamine/zinc oxide Lotion 1 Application(s) Topical three times a day  dextrose 10% Bolus 125 milliLiter(s) IV Bolus once  dextrose 5%. 1000 milliLiter(s) (100 mL/Hr) IV Continuous <Continuous>  dextrose 5%. 1000 milliLiter(s) (50 mL/Hr) IV Continuous <Continuous>  dextrose 50% Injectable 25 Gram(s) IV Push once  dextrose 50% Injectable 12.5 Gram(s) IV Push once  droxidopa 600 milliGRAM(s) Oral three times a day  ferrous    sulfate 325 milliGRAM(s) Oral two times a day  fludroCORTISONE 0.2 milliGRAM(s) Oral every 12 hours  glucagon  Injectable 1 milliGRAM(s) IntraMuscular once  insulin lispro (ADMELOG) corrective regimen sliding scale   SubCutaneous three times a day before meals  insulin lispro (ADMELOG) corrective regimen sliding scale   SubCutaneous <User Schedule>  insulin lispro Injectable (ADMELOG) 8 Unit(s) SubCutaneous three times a day before meals  levothyroxine 75 MICROGram(s) Oral daily  midodrine. 15 milliGRAM(s) Oral three times a day  Nephro-molly 1 Tablet(s) Oral daily  pantoprazole    Tablet 40 milliGRAM(s) Oral every 24 hours  triamcinolone 0.1% Ointment 1 Application(s) Topical two times a day    MEDICATIONS  (PRN):  dextrose Oral Gel 15 Gram(s) Oral once PRN Blood Glucose LESS THAN 70 milliGRAM(s)/deciliter      I&O's Summary    01 Jun 2024 07:01  -  02 Jun 2024 07:00  --------------------------------------------------------  IN: 1670 mL / OUT: 2050 mL / NET: -380 mL        Vital Signs Last 24 Hrs  T(C): 36.7 (02 Jun 2024 05:30), Max: 36.7 (02 Jun 2024 05:30)  T(F): 98 (02 Jun 2024 05:30), Max: 98 (02 Jun 2024 05:30)  HR: 75 (02 Jun 2024 05:30) (75 - 77)  BP: 124/62 (02 Jun 2024 05:30) (108/65 - 124/62)  BP(mean): --  RR: 18 (02 Jun 2024 05:30) (18 - 19)  SpO2: 97% (02 Jun 2024 05:30) (97% - 97%)    Parameters below as of 02 Jun 2024 05:30  Patient On (Oxygen Delivery Method): room air        CAPILLARY BLOOD GLUCOSE      POCT Blood Glucose.: 177 mg/dL (02 Jun 2024 02:22)  POCT Blood Glucose.: 148 mg/dL (02 Jun 2024 00:01)  POCT Blood Glucose.: 129 mg/dL (01 Jun 2024 22:15)  POCT Blood Glucose.: 49 mg/dL (01 Jun 2024 21:44)  POCT Blood Glucose.: 47 mg/dL (01 Jun 2024 21:26)  POCT Blood Glucose.: 51 mg/dL (01 Jun 2024 21:26)  POCT Blood Glucose.: 89 mg/dL (01 Jun 2024 17:05)  POCT Blood Glucose.: 146 mg/dL (01 Jun 2024 13:01)  POCT Blood Glucose.: 184 mg/dL (01 Jun 2024 09:27)      PHYSICAL EXAM:  GENERAL: NAD,   HEAD:  Atraumatic, Normocephalic  EYES: EOMI, conjunctiva and sclera clear  ENMT: Moist mucous membranes  CHEST/LUNG: Clear to auscultation bilaterally; No rales, rhonchi, wheezing, or rubs  HEART: Regular rate and rhythm, systolic murmur  ABDOMEN: Soft, Nontender, Nondistended; Bowel sounds present  EXTREMITIES:  2+ Peripheral Pulses, 1-2+ b/l edema up to thighs in ace wrap. 3+ edema and erythema to LUE but no warmth or tenderness. Full ROM with strength/sensation/pulses intact  SKIN: erythematous, macular rash to anterior chest wall, neck, and upper extremities improving  NERVOUS SYSTEM:  Alert, no focal deficits  PSYCH:  Appropriate affect       LABS:                        10.4   8.42  )-----------( 186      ( 01 Jun 2024 12:06 )             33.5     Auto Eosinophil # x     / Auto Eosinophil % x     / Auto Neutrophil # x     / Auto Neutrophil % x     / BANDS % x        06-01    133<L>  |  101  |  34<H>  ----------------------------<  247<H>  3.9   |  20<L>  |  1.95<H>    Ca    8.4      01 Jun 2024 12:06  Mg     1.8     06-01  Phos  3.6     06-01          Urinalysis Basic - ( 01 Jun 2024 12:06 )    Color: x / Appearance: x / SG: x / pH: x  Gluc: 247 mg/dL / Ketone: x  / Bili: x / Urobili: x   Blood: x / Protein: x / Nitrite: x   Leuk Esterase: x / RBC: x / WBC x   Sq Epi: x / Non Sq Epi: x / Bacteria: x            RADIOLOGY & ADDITIONAL TESTS:    Imaging Personally Reviewed:    Consultant(s) Notes Reviewed:      Care Discussed with Consultants/Other Providers:

## 2024-06-02 NOTE — PROGRESS NOTE ADULT - PROBLEM SELECTOR PLAN 1
- Test BG ac and hs/2am  - Adjust Lantus dose back  to 14 units at hs.   - Decrease Admelog dose to 5 units TIDAC.  Patient requests this dose not be changed. Feels that 5units works for her when she is eating. If she is not eating she will refuse the dose.    - Continue Low dose admelog correction scale TIDAC, Low dose admelog correction scale QHS and 2am in case of rebound hypo/hyperglycemia  Discharge:  Will be determined according to BG/insulin needs/PO intake  at time of discharge. Basal/bolus insulin doses TBD  - Of note, patient instructed on discharge from recent hospitalization at [place] to start farxiga for CHF. Given risks of DKA of SGLT2i in T1DM, would not start until better data is available for its benefits.  - Follow up with Dr. Luis Dumont outpatient  -Pt will likely required rehab  F/u with optho/renal/cardiac as out pt

## 2024-06-02 NOTE — PROGRESS NOTE ADULT - PROBLEM SELECTOR PLAN 9
Gestational hypertension, third trimester TSH initially mildly elevated 4.79 - 5.06, FT4 1.3-1.5  TSH of 10.20 on 5/28, repeat T4 wnl  - c/w levothyroxine 75 daily as per endo   - Recheck TSH and FT4 outpatient

## 2024-06-02 NOTE — PROGRESS NOTE ADULT - ASSESSMENT
73F w/h/o of T1DM with unknown control due to CKD and anemia of chronic disease. DM c/b CKD. Also h/o HFrEF (EF 30%), mod AS, known LBBB, HTN,  hypothyroidism, chronic lymphedema, suspected OHS/LELIA on 3L NC home O2 p/w 1 episode of syncope with R arm jerking, likely 2/2 severe symptomatic AS. S/p AVR 4/24 c/b DUNCAN on CKD, fever and hypotension. Also UTI/pul edema/ sepsis and L adrenal nodule finding. Also orthostatic hypotension > now on Florinef. BG levels variable due to variable PO intake. Pt also refusing insulin on and off. Patient only wants admelog 5units with meals. she will refuse if not eating. she also received 1/2 dose of lantus last night due to hypoglycemia prebed.   Most BG are at goal 100-180mg/dl. Will Adjust insulin doses. BG goal 100 to 180s. No hypoglycemia.       Per endocrine attending Dr Burton> Adrenal nodule work up completed and pt will need to f/u once she is more stable as out pt.       Home regimen: Lantus 33 units qhs, Novolog based on sliding scale TIDAC normally 3-5 units  Has Dexcom G7

## 2024-06-02 NOTE — PROGRESS NOTE ADULT - SUBJECTIVE AND OBJECTIVE BOX
INTERVAL HPI/OVERNIGHT EVENTS:  No new overnight event.  No N/V/D.  Tolerating diet.    Allergies    contrast media (iodine-based) (Fever; Vomiting; Flushing; Hypotension; Rash; Stomach Upset)  Ativan (Rash; Urticaria)  penicillins (Hives (Mod to Severe); Anaphylaxis (Mod to Severe); Short breath (Mod to Severe); Angioedema (Mod to Severe))    Intolerances    General:  No wt loss, fevers, chills, night sweats, fatigue,   Eyes:  Good vision, no reported pain  ENT:  No sore throat, pain, runny nose, dysphagia  CV:  No pain, palpitations, hypo/hypertension  Resp:  No dyspnea, cough, tachypnea, wheezing  GI:  No pain, No nausea, No vomiting, No diarrhea, No constipation, No weight loss, No fever, No pruritis, No rectal bleeding, No tarry stools, No dysphagia,  :  No pain, bleeding, incontinence, nocturia  Muscle:  No pain, weakness  Neuro:  No weakness, tingling, memory problems  Psych:  No fatigue, insomnia, mood problems, depression  Endocrine:  No polyuria, polydipsia, cold/heat intolerance  Heme:  No petechiae, ecchymosis, easy bruisability  Skin:  No rash, tattoos, scars, edema      PHYSICAL EXAM:   Vital Signs:  Vital Signs Last 24 Hrs  T(C): 36.9 (2024 12:25), Max: 36.9 (2024 12:25)  T(F): 98.4 (2024 12:25), Max: 98.4 (2024 12:25)  HR: 75 (2024 05:30) (75 - 77)  BP: 124/62 (2024 05:30) (108/65 - 124/62)  BP(mean): --  RR: 18 (2024 12:25) (18 - 19)  SpO2: 98% (2024 12:25) (97% - 98%)    Parameters below as of 2024 12:25  Patient On (Oxygen Delivery Method): room air      Daily     Daily Weight in k.3 (2024 05:30)I&O's Summary    2024 07:01  -  2024 07:00  --------------------------------------------------------  IN: 1670 mL / OUT: 2050 mL / NET: -380 mL    2024 07:01  -  2024 14:13  --------------------------------------------------------  IN: 360 mL / OUT: 550 mL / NET: -190 mL        GENERAL:  Appears stated age, well-groomed, well-nourished, no distress  HEENT:  NC/AT,  conjunctivae clear and pink, no thyromegaly, nodules, adenopathy, no JVD, sclera -anicteric  CHEST:  Full & symmetric excursion, no increased effort, breath sounds clear  HEART:  Regular rhythm, S1, S2, no murmur/rub/S3/S4, no abdominal bruit, no edema  ABDOMEN:  Soft, non-tender, non-distended, normoactive bowel sounds,  no masses ,no hepato-splenomegaly, no signs of chronic liver disease  EXTEREMITIES:  no cyanosis,clubbing or edema  SKIN:  No rash/erythema/ecchymoses/petechiae/wounds/abscess/warm/dry  NEURO:  Alert, oriented, no asterixis, no tremor, no encephalopathy      LABS:                        10.4   8.11  )-----------( 166      ( 2024 07:05 )             34.2     06-02    138  |  104  |  32<H>  ----------------------------<  200<H>  4.5   |  18<L>  |  1.77<H>    Ca    8.5      2024 07:05  Phos  3.7     06-02  Mg     2.0     06-02        Urinalysis Basic - ( 2024 07:05 )    Color: x / Appearance: x / SG: x / pH: x  Gluc: 200 mg/dL / Ketone: x  / Bili: x / Urobili: x   Blood: x / Protein: x / Nitrite: x   Leuk Esterase: x / RBC: x / WBC x   Sq Epi: x / Non Sq Epi: x / Bacteria: x      amylase   lipase  RADIOLOGY & ADDITIONAL TESTS:

## 2024-06-02 NOTE — PROGRESS NOTE ADULT - PROBLEM SELECTOR PLAN 1
Likely in setting of deconditioning.  Improving with fluids.  Will attempt orthostatics daily.  Hoping to wean off midodrine in order to restart GDMT.  Orthostatics improving from supine to seated, but still problematic with standing.  Remains orthostatic and symptomatic on standing  - leg compression, difficulty with abdominal binder due to body habitus   - OOB as tolerated   - midodrine 15mg TID.  - continue droxydopa 600mg TID  - Continue fludrocortisone 0.2mg daily  - Continue to evaluate standing orthostatics daily Likely in setting of deconditioning.  Improving with fluids.  Will attempt orthostatics daily.  Hoping to wean off midodrine in order to restart GDMT.  Orthostatics improving from supine to seated, but still problematic with standing.  Remains orthostatic and symptomatic on standing  - leg compression, difficulty with abdominal binder due to body habitus   - OOB as tolerated   - midodrine 20mg TID.  - continue droxydopa 600mg TID  - Continue fludrocortisone 0.2mg daily  - Continue to evaluate standing orthostatics daily

## 2024-06-02 NOTE — PROGRESS NOTE ADULT - PROBLEM SELECTOR PLAN 7
EF from 4/2024 (post-TAVR) w/ EF 25%, global LV hypokinesis, moderately dilated LV, grade 2 LV diastolic dysfunction, RA moderately dilated.  Repeat TTE with reduced LV function.  TAVR remains well seated  - cards following, will f/u with cardiology regarding resuming GDMT  - continue midodrine 15mg TID   - Continue atorvastatin 80mg daily  - hold home torsemide 20mg qd, hold home eplerenone 25mg qd, hold home metoprolol succinate 100mg qd  - f/u repeat limited TTE  - f/u w/ EP outpatient for CRT.    #Aortic Stenosis  Presented for syncope secondary to severe AS, s/p TAVR on 4/24 c/b by VT -> shock -> VFib -> shock.  Course complicated w/ symptomatic bradycardia, requiring TVP, now s/p micra PPM. TAVR well seated on recent TTE without regurgitation  - continue aspirin 81mg daily. EF from 4/2024 (post-TAVR) w/ EF 25%, global LV hypokinesis, moderately dilated LV, grade 2 LV diastolic dysfunction, RA moderately dilated.  Repeat TTE with reduced LV function.  TAVR remains well seated  - cards following, will f/u with cardiology regarding resuming GDMT  - per cards, overnight telemetry 6/1 consistent with afib, recommending to continue monitoring on tele  - continue midodrine 15mg TID   - Continue atorvastatin 80mg daily  - hold home torsemide 20mg qd, hold home eplerenone 25mg qd, hold home metoprolol succinate 100mg qd  - f/u repeat limited TTE  - f/u w/ EP outpatient for CRT.    #Aortic Stenosis  Presented for syncope secondary to severe AS, s/p TAVR on 4/24 c/b by VT -> shock -> VFib -> shock.  Course complicated w/ symptomatic bradycardia, requiring TVP, now s/p micra PPM. TAVR well seated on recent TTE without regurgitation  - continue aspirin 81mg daily.

## 2024-06-02 NOTE — PROVIDER CONTACT NOTE (HYPOGLYCEMIA EVENT) - NS PROVIDER CONTACT ASSESS-HYPO
Pt reports feeling normal, denies any chills, diaphoresis, lightheadedness, or dizziness. Pt VS stable. Glucose level rechecked.
fs 41 from am labbs   pt asymptomatic 
asymptomatic vvs

## 2024-06-02 NOTE — PROGRESS NOTE ADULT - NSPROGADDITIONALINFOA_GEN_ALL_CORE
Contact via [employer] Teams during business hours  To reach covering provider access AMION via sun[employer] tools  For Urgent matters/after-hours/weekends/holidays please page endocrine fellow on call   For nonurgent matters please email [email or web address]    Please note that this patient may be followed by different provider tomorrow.  Notify endocrine 24 hours prior to discharge for final recommendations

## 2024-06-02 NOTE — PROGRESS NOTE ADULT - SUBJECTIVE AND OBJECTIVE BOX
MR#[id number]  PATIENT NAME:JOHNATHAN LOPEZ    DATE OF SERVICE: 06-02-24 @ 06:52  Patient was seen and examined by Cuco Tubbs MD on    06-02-24 @ 06:52 .  Interim events noted.Consultant notes ,Labs,Telemetry reviewed by me       Covering for [facility] -Howard Sterling, Carlos Luong, Herman Alston  Office Number: [phone]         HOSPITAL COURSE: HPI:  73F w HFrEF (EF 30%), mod AS, known LBBB, HTN, IDDM1, CKD, hypothyroidism, chronic lymphedema, suspected OHS/LELIA on 3L NC home O2 p/w 1 episode of syncope. Recent admission at [facility] (3/29-4/5) for ADHF and DUNCAN s/p diuresis, discharged with new home O2 3L NC suspecting OHS/LELIA pending outpatient w/u. Since discharge a week ago, she has been staying at home with   Pt had sob walking to car. Once in car, she was still breathing heavily. Partner noticed pt was not responding to verbal stimuli for 10-15sec and witnessed R arm jerking. Pt gained consciousness quickly without postictal symptoms. Pt went to Cardiologist Dr. Nathan who recommended pt to come to ED. No fever, cp, abd pain, n/v. Leg swelling is improved from prior. Pt on torsemide 40mg which she has been taking. Pt was discharged on 3LNC which she is currently on. Patient reports she did not take Farxiga that she was discharged on as she was concerned about side effects.     In ED, patient was found afebrile, hemodynamically stable, breathing well on 3L NC. Initial labs notable for mild hyponatremia, DUNCAN on CKD, elevated proBNP and elevated pCO2 both improved from prior. (12 Apr 2024 16:31)      INTERIM EVENTS:Patient seen at bedside ,interim events noted.  s/p TAVR 4/24 c/b by VT and Vfib requiring shock and flash pulmonary edema requiring intubation.   Course complicated by contrast induced allergic reaction (had CTA for TAVR eval) and contrast related renal failure (acute on chronic) requiring CRRT for fluid removal and pressors for vasoplegia and hypovolemia due to CRRT.   Patient now extubated, off pressors, CRRT and downgraded to medicine floors.    Patient had maroon-colored stool on 5/24, positive for blood.  Hemodynamically stable with stable hemoglobin.  RESOLVED  6/1-Awake was sitting yesterday still orthostatic      PMH -reviewed admission note, no change since admission  HEART FAILURE: Acute[ ]Chronic[x ] Systolic[ x] Diastolic[ ] Combined Systolic and Diastolic[ ]  CAD[ ] CABG[ ] PCI[ ]  DEVICES[ ] PPM[ ] ICD[ ] ILR[ ]  ATRIAL FIBRILLATION[ ] Paroxysmal[ ] Permanent[ ] CHADS2-[  ]  DUNCAN[ ] CKD1[ ] CKD2[ ] CKD3[ ] CKD4[ ] ESRD[ ]  COPD[ ] HTN[ ]   DM[ ] Type1[ ] Type 2[ ]   CVA[ ] Paresis[ ]    AMBULATION: Assisted[ ] Cane/walker[x ] Independent[ ]    MEDICATIONS  (STANDING):  ammonium lactate 12% Lotion 1 Application(s) Topical <User Schedule>  apixaban 5 milliGRAM(s) Oral two times a day  aspirin  chewable 81 milliGRAM(s) Oral daily  atorvastatin 80 milliGRAM(s) Oral at bedtime  calamine/zinc oxide Lotion 1 Application(s) Topical three times a day  droxidopa 600 milliGRAM(s) Oral three times a day  ferrous    sulfate 325 milliGRAM(s) Oral two times a day  fludroCORTISONE 0.2 milliGRAM(s) Oral every 12 hours  glucagon  Injectable 1 milliGRAM(s) IntraMuscular once  insulin lispro (ADMELOG) corrective regimen sliding scale   SubCutaneous three times a day before meals  insulin lispro (ADMELOG) corrective regimen sliding scale   SubCutaneous <User Schedule>  insulin lispro Injectable (ADMELOG) 8 Unit(s) SubCutaneous three times a day before meals  levothyroxine 75 MICROGram(s) Oral daily  midodrine. 15 milliGRAM(s) Oral three times a day  Nephro-molly 1 Tablet(s) Oral daily  pantoprazole    Tablet 40 milliGRAM(s) Oral every 24 hours  triamcinolone 0.1% Ointment 1 Application(s) Topical two times a day    MEDICATIONS  (PRN):  dextrose Oral Gel 15 Gram(s) Oral once PRN Blood Glucose LESS THAN 70 milliGRAM(s)/deciliter            REVIEW OF SYSTEMS:  Constitutional: [ ] fever, [ ]weight loss,  [x ]fatigue [ ]weight gain  Eyes: [ ] visual changes  Respiratory: [ ]shortness of breath;  [ ] cough, [ ]wheezing, [ ]chills, [ ]hemoptysis  Cardiovascular: [ ] chest pain, [ ]palpitations, [x ]dizziness,  [ xx]leg swelling[ ]orthopnea[ ]PND  Gastrointestinal: [ ] abdominal pain, [ ]nausea, [ ]vomiting,  [ ]diarrhea [ ]Constipation [ ]Melena  Genitourinary: [ ] dysuria, [ ] hematuria [ ]De La Garza  Neurologic: [ ] headaches [ ] tremors[ ]weakness [ ]Paralysis Right[ ] Left[ ]  Skin: [ ] itching, [ ]burning, [x ] rashes  Endocrine: [ ] heat or cold intolerance  Musculoskeletal: [ ] joint pain or swelling; [ ] muscle, back, or extremity pain  Psychiatric: [ ] depression, [ ]anxiety, [ ]mood swings, or [ ]difficulty sleeping  Hematologic: [ ] easy bruising, [ ] bleeding gums    [ ] All remaining systems negative except as per above.   [ ]Unable to obtain.  [x] No change in ROS since admission      Vital Signs Last 24 Hrs  T(C): 36.7 (02 Jun 2024 05:30), Max: 36.7 (02 Jun 2024 05:30)  T(F): 98 (02 Jun 2024 05:30), Max: 98 (02 Jun 2024 05:30)  HR: 75 (02 Jun 2024 05:30) (75 - 77)  BP: 124/62 (02 Jun 2024 05:30) (108/65 - 124/62)  Fjldjiepxcaq-Gkfcc-540/75  Sitting 135/79  RR: 18 (02 Jun 2024 05:30) (18 - 19)  SpO2: 97% (02 Jun 2024 05:30) (97% - 97%)    Parameters below as of 02 Jun 2024 05:30  Patient On (Oxygen Delivery Method): room air      I&O's Summary    31 May 2024 07:01  -  01 Jun 2024 07:00  --------------------------------------------------------  IN: 1170 mL / OUT: 1480 mL / NET: -310 mL    01 Jun 2024 07:01  -  02 Jun 2024 06:52  --------------------------------------------------------  IN: 1670 mL / OUT: 2050 mL / NET: -380 mL        PHYSICAL EXAM:  General: No acute distress BMI-34  HEENT: EOMI, PERRL  Neck: Supple, [ ] JVD  Lungs: Equal air entry bilaterally; [ ] rales [ ] wheezing [ ] rhonchi  Heart: Regular rate and rhythm; [x ] murmur   2/6 [ x] systolic [ ] diastolic [ ] radiation[ ] rubs [ ]  gallops  Abdomen: Nontender, bowel sounds present  Extremities: No clubbing, cyanosis, [xx ] edema [ ]Pulses  equal and intact  Nervous system:  Alert & Oriented X3, no focal deficits  Psychiatric: Normal affect  Skin: No rashes or lesions    LABS:  06-01    133<L>  |  101  |  34<H>  ----------------------------<  247<H>  3.9   |  20<L>  |  1.95<H>    Ca    8.4      01 Jun 2024 12:06  Phos  3.6     06-01  Mg     1.8     06-01      Creatinine Trend: 1.95<--, 2.50<--, 2.39<--, 2.40<--, 2.17<--, 2.20<--                        10.4   8.42  )-----------( 186      ( 01 Jun 2024 12:06 )             33.5            VA Duplex Upper Ext Vein Scan, Left (05.31.24 @ 17:07) >  FINDINGS:    The left internal jugular and brachial veins are patent and compressible  where applicable.    Persistent DVT noted in the left subclavian and axillaryveins.    IMPRESSION:  Persistent DVT noted in the left subclavian and axillary veins.      TTE W or WO Ultrasound Enhancing Agent (05.21.24 @ 16:05) >  CONCLUSIONS:      1. Global left ventricular hypokinesis.   2. Mildly enlarged right ventricular cavity size and probably normal systolic function. Tricuspid annular plane systolic excursion (TAPSE) is 2.6 cm (normal >=1.7 cm).   3. A Benitez Bakari (TAVR) valve replacement is present in the aortic position The prosthetic valve iswell seated with normal function. No intravalvular regurgitation No paravalvular regurgitation.   4. Compared to the transthoracic echocardiogram performed on 4/25/2024, there have been no significant interval changes.   5. Left ventricular endocardium is not well visualized; however, the left ventricular systolic function appears severely reduced.   6. Technically difficult image quality.         MR#[id number]  PATIENT NAME:JOHNATHAN LOPEZ    DATE OF SERVICE: 06-02-24 @ 06:52  Patient was seen and examined by Cuco Tubbs MD on    06-02-24 @ 06:52 .  Interim events noted.Consultant notes ,Labs,Telemetry reviewed by me       Covering for [facility] -Howard Sterling, Carlos Luong, Herman Alston  Office Number: [phone]         HOSPITAL COURSE: HPI:  73F w HFrEF (EF 30%), mod AS, known LBBB, HTN, IDDM1, CKD, hypothyroidism, chronic lymphedema, suspected OHS/LELIA on 3L NC home O2 p/w 1 episode of syncope. Recent admission at [facility] (3/29-4/5) for ADHF and DUNCAN s/p diuresis, discharged with new home O2 3L NC suspecting OHS/LELIA pending outpatient w/u. Since discharge a week ago, she has been staying at home with   Pt had sob walking to car. Once in car, she was still breathing heavily. Partner noticed pt was not responding to verbal stimuli for 10-15sec and witnessed R arm jerking. Pt gained consciousness quickly without postictal symptoms. Pt went to Cardiologist Dr. Nathan who recommended pt to come to ED. No fever, cp, abd pain, n/v. Leg swelling is improved from prior. Pt on torsemide 40mg which she has been taking. Pt was discharged on 3LNC which she is currently on. Patient reports she did not take Farxiga that she was discharged on as she was concerned about side effects.     In ED, patient was found afebrile, hemodynamically stable, breathing well on 3L NC. Initial labs notable for mild hyponatremia, DUNCAN on CKD, elevated proBNP and elevated pCO2 both improved from prior. (12 Apr 2024 16:31)      INTERIM EVENTS:Patient seen at bedside ,interim events noted.  s/p TAVR 4/24 c/b by VT and Vfib requiring shock and flash pulmonary edema requiring intubation.   Course complicated by contrast induced allergic reaction (had CTA for TAVR eval) and contrast related renal failure (acute on chronic) requiring CRRT for fluid removal and pressors for vasoplegia and hypovolemia due to CRRT.   Patient now extubated, off pressors, CRRT and downgraded to medicine floors.    Patient had maroon-colored stool on 5/24, positive for blood.  Hemodynamically stable with stable hemoglobin.  RESOLVED  6/1-Awake was sitting yesterday still orthostatic-had episode tachycardia overnight-Atrial tachy/Fib      PMH -reviewed admission note, no change since admission  HEART FAILURE: Acute[ ]Chronic[x ] Systolic[ x] Diastolic[ ] Combined Systolic and Diastolic[ ]  CAD[ ] CABG[ ] PCI[ ]  DEVICES[ ] PPM[ ] ICD[ ] ILR[ ]  ATRIAL FIBRILLATION[ ] Paroxysmal[ ] Permanent[ ] CHADS2-[  ]  DUNCAN[ ] CKD1[ ] CKD2[ ] CKD3[ ] CKD4[ ] ESRD[ ]  COPD[ ] HTN[ ]   DM[ ] Type1[ ] Type 2[ ]   CVA[ ] Paresis[ ]    AMBULATION: Assisted[ ] Cane/walker[x ] Independent[ ]    MEDICATIONS  (STANDING):  ammonium lactate 12% Lotion 1 Application(s) Topical <User Schedule>  apixaban 5 milliGRAM(s) Oral two times a day  aspirin  chewable 81 milliGRAM(s) Oral daily  atorvastatin 80 milliGRAM(s) Oral at bedtime  calamine/zinc oxide Lotion 1 Application(s) Topical three times a day  droxidopa 600 milliGRAM(s) Oral three times a day  ferrous    sulfate 325 milliGRAM(s) Oral two times a day  fludroCORTISONE 0.2 milliGRAM(s) Oral every 12 hours  glucagon  Injectable 1 milliGRAM(s) IntraMuscular once  insulin lispro (ADMELOG) corrective regimen sliding scale   SubCutaneous three times a day before meals  insulin lispro (ADMELOG) corrective regimen sliding scale   SubCutaneous <User Schedule>  insulin lispro Injectable (ADMELOG) 8 Unit(s) SubCutaneous three times a day before meals  levothyroxine 75 MICROGram(s) Oral daily  midodrine. 15 milliGRAM(s) Oral three times a day  Nephro-molly 1 Tablet(s) Oral daily  pantoprazole    Tablet 40 milliGRAM(s) Oral every 24 hours  triamcinolone 0.1% Ointment 1 Application(s) Topical two times a day    MEDICATIONS  (PRN):  dextrose Oral Gel 15 Gram(s) Oral once PRN Blood Glucose LESS THAN 70 milliGRAM(s)/deciliter            REVIEW OF SYSTEMS:  Constitutional: [ ] fever, [ ]weight loss,  [x ]fatigue [ ]weight gain  Eyes: [ ] visual changes  Respiratory: [ ]shortness of breath;  [ ] cough, [ ]wheezing, [ ]chills, [ ]hemoptysis  Cardiovascular: [ ] chest pain, [ ]palpitations, [x ]dizziness,  [ xx]leg swelling[ ]orthopnea[ ]PND  Gastrointestinal: [ ] abdominal pain, [ ]nausea, [ ]vomiting,  [ ]diarrhea [ ]Constipation [ ]Melena  Genitourinary: [ ] dysuria, [ ] hematuria [ ]De La Garza  Neurologic: [ ] headaches [ ] tremors[ ]weakness [ ]Paralysis Right[ ] Left[ ]  Skin: [ ] itching, [ ]burning, [x ] rashes  Endocrine: [ ] heat or cold intolerance  Musculoskeletal: [ ] joint pain or swelling; [ ] muscle, back, or extremity pain  Psychiatric: [ ] depression, [ ]anxiety, [ ]mood swings, or [ ]difficulty sleeping  Hematologic: [ ] easy bruising, [ ] bleeding gums    [ ] All remaining systems negative except as per above.   [ ]Unable to obtain.  [x] No change in ROS since admission      Vital Signs Last 24 Hrs  T(C): 36.7 (02 Jun 2024 05:30), Max: 36.7 (02 Jun 2024 05:30)  T(F): 98 (02 Jun 2024 05:30), Max: 98 (02 Jun 2024 05:30)  HR: 75 (02 Jun 2024 05:30) (75 - 77)  BP: 124/62 (02 Jun 2024 05:30) (108/65 - 124/62)  Qyfesnyligap-Sdxwf-200/75  Sitting 135/79  RR: 18 (02 Jun 2024 05:30) (18 - 19)  SpO2: 97% (02 Jun 2024 05:30) (97% - 97%)    Parameters below as of 02 Jun 2024 05:30  Patient On (Oxygen Delivery Method): room air      I&O's Summary    31 May 2024 07:01  -  01 Jun 2024 07:00  --------------------------------------------------------  IN: 1170 mL / OUT: 1480 mL / NET: -310 mL    01 Jun 2024 07:01  -  02 Jun 2024 06:52  --------------------------------------------------------  IN: 1670 mL / OUT: 2050 mL / NET: -380 mL        PHYSICAL EXAM:  General: No acute distress BMI-34  HEENT: EOMI, PERRL  Neck: Supple, [ ] JVD  Lungs: Equal air entry bilaterally; [ ] rales [ ] wheezing [ ] rhonchi  Heart: Regular rate and rhythm; [x ] murmur   2/6 [ x] systolic [ ] diastolic [ ] radiation[ ] rubs [ ]  gallops  Abdomen: Nontender, bowel sounds present  Extremities: No clubbing, cyanosis, [xx ] edema [ ]Pulses  equal and intact  Nervous system:  Alert & Oriented X3, no focal deficits  Psychiatric: Normal affect  Skin: No rashes or lesions    LABS:  06-01    133<L>  |  101  |  34<H>  ----------------------------<  247<H>  3.9   |  20<L>  |  1.95<H>    Ca    8.4      01 Jun 2024 12:06  Phos  3.6     06-01  Mg     1.8     06-01      Creatinine Trend: 1.95<--, 2.50<--, 2.39<--, 2.40<--, 2.17<--, 2.20<--                        10.4   8.42  )-----------( 186      ( 01 Jun 2024 12:06 )             33.5            VA Duplex Upper Ext Vein Scan, Left (05.31.24 @ 17:07) >  FINDINGS:    The left internal jugular and brachial veins are patent and compressible  where applicable.    Persistent DVT noted in the left subclavian and axillaryveins.    IMPRESSION:  Persistent DVT noted in the left subclavian and axillary veins.      TTE W or WO Ultrasound Enhancing Agent (05.21.24 @ 16:05) >  CONCLUSIONS:      1. Global left ventricular hypokinesis.   2. Mildly enlarged right ventricular cavity size and probably normal systolic function. Tricuspid annular plane systolic excursion (TAPSE) is 2.6 cm (normal >=1.7 cm).   3. A Benitez Bakari (TAVR) valve replacement is present in the aortic position The prosthetic valve iswell seated with normal function. No intravalvular regurgitation No paravalvular regurgitation.   4. Compared to the transthoracic echocardiogram performed on 4/25/2024, there have been no significant interval changes.   5. Left ventricular endocardium is not well visualized; however, the left ventricular systolic function appears severely reduced.   6. Technically difficult image quality.

## 2024-06-02 NOTE — PROGRESS NOTE ADULT - TIME BILLING
- Review of records, telemetry, vital signs and daily labs.   - General and cardiovascular physical examination.  - Generation of cardiovascular treatment plan.  - Coordination of care.      Patient was seen and examined by me on  06/02/2024,interim events noted,labs and radiology studies reviewed.  Cuco Tubbs MD,FACC.  [address] [place], [place] [id number].  [phone]

## 2024-06-03 NOTE — PROGRESS NOTE ADULT - PROBLEM SELECTOR PLAN 1
Likely in setting of deconditioning.  Improving with fluids.  Will attempt orthostatics daily.  Hoping to wean off midodrine in order to restart GDMT.  Orthostatics improving from supine to seated, but still problematic with standing.  Remains orthostatic and symptomatic on standing  - leg compression, difficulty with abdominal binder due to body habitus   - OOB as tolerated   - midodrine 20mg TID.  - continue droxydopa 600mg TID  - Continue fludrocortisone 0.2mg daily  - Continue to evaluate standing orthostatics daily

## 2024-06-03 NOTE — PROGRESS NOTE ADULT - ATTENDING COMMENTS
73F PMH: HFrEF (EF 30%), AS, LBBB, HTN, DM1, CKD, hypothyroidism, chronic lymphedema, LELIA (3L home O2) p/w for syncope 2/2 severe AS, s/p TAVR 4/24. Course complicated by contrast induced allergic reaction (had CTA for TAVR eval) and contrast related renal failure (acute on chronic) requiring HD. TAVR c/b VT and vfib requiring shock and flash pulmonary edema causing Acute hypoxic respiratory failure  requiring intubation.  Course complicated by contrast induced allergic reaction (had CTA for TAVR eval) and contrast related renal failure (acute on chronic) requiring CRRT for fluid removal and pressors for vasoplegia and hypovolemia due to CRRT. Patient extubated, now off pressors, CRRT.     1. Orthostatic hypotension- droxydopa, midodrine with florinef. patient's orthostatics still positive, symptomatic. Compression stockings and abdominal binder ordered. Spoke to PT- will see pt w PT tomorrow as reassess orthostatics.     2. Urinary retention- failed 3 voiding trials.   3. Left UE DVT- c/w eliquis. Repeat US showing persistent DVT noted in the left subclavian and axillary veins. ACE wrap for swelling.   4. T1DM- uncontrolled with intermittent episodes of labile BS- . Being managed by endocrinology. Currently on Lantus and Admelog.   5. DUNCAN on CKD stage 4- cr stable.  6. HFrEF- currently GDMT is on hold secondary to orthostatic hypotension.

## 2024-06-03 NOTE — PROGRESS NOTE ADULT - SUBJECTIVE AND OBJECTIVE BOX
patient seen earlier today  no new complaints     REVIEW OF SYSTEMS  Constitutional - No fever,  No fatigue  HEENT - No vertigo, No neck pain  Neurological - No headaches, No memory loss  Psychiatric - No depression, No anxiety    FUNCTIONAL PROGRESS  PT 6/2  bed mobility min assist   limited by orthostatic hypotension    OT 5/31  bed mobility min assist   limited by orthostatic hypotension   dressing max assist     VITALS  T(C): 37 (06-03-24 @ 11:07), Max: 37 (06-03-24 @ 11:07)  HR: 87 (06-03-24 @ 04:42) (72 - 87)  BP: 118/53 (06-03-24 @ 04:42) (118/53 - 147/68)  RR: 18 (06-03-24 @ 04:42) (18 - 18)  SpO2: 95% (06-03-24 @ 04:42) (95% - 100%)  Wt(kg): --    MEDICATIONS   ammonium lactate 12% Lotion 1 Application(s) <User Schedule>  apixaban 5 milliGRAM(s) two times a day  aspirin  chewable 81 milliGRAM(s) daily  atorvastatin 80 milliGRAM(s) at bedtime  calamine/zinc oxide Lotion 1 Application(s) three times a day  dextrose 10% Bolus 125 milliLiter(s) once  dextrose 5%. 1000 milliLiter(s) <Continuous>  dextrose 5%. 1000 milliLiter(s) <Continuous>  dextrose 50% Injectable 25 Gram(s) once  dextrose 50% Injectable 12.5 Gram(s) once  dextrose Oral Gel 15 Gram(s) once PRN  droxidopa 600 milliGRAM(s) three times a day  ferrous    sulfate 325 milliGRAM(s) two times a day  fludroCORTISONE 0.2 milliGRAM(s) every 12 hours  glucagon  Injectable 1 milliGRAM(s) once  insulin glargine Injectable (LANTUS) 14 Unit(s) at bedtime  insulin lispro (ADMELOG) corrective regimen sliding scale   three times a day before meals  insulin lispro (ADMELOG) corrective regimen sliding scale   <User Schedule>  insulin lispro Injectable (ADMELOG) 5 Unit(s) three times a day before meals  levothyroxine 75 MICROGram(s) daily  midodrine. 20 milliGRAM(s) three times a day  Nephro-molly 1 Tablet(s) daily  pantoprazole    Tablet 40 milliGRAM(s) every 24 hours  psyllium Powder 1 Packet(s) daily  triamcinolone 0.1% Ointment 1 Application(s) two times a day      RECENT LABS - Reviewed                        10.5   7.76  )-----------( 151      ( 03 Jun 2024 06:41 )             33.5     06-03    137  |  104  |  32<H>  ----------------------------<  166<H>  4.5   |  18<L>  |  2.06<H>    Ca    8.1<L>      03 Jun 2024 06:40  Phos  4.0     06-03  Mg     2.0     06-03        Urinalysis Basic - ( 03 Jun 2024 06:40 )    Color: x / Appearance: x / SG: x / pH: x  Gluc: 166 mg/dL / Ketone: x  / Bili: x / Urobili: x   Blood: x / Protein: x / Nitrite: x   Leuk Esterase: x / RBC: x / WBC x   Sq Epi: x / Non Sq Epi: x / Bacteria: x        ----------------------------------------------------------------------------------------  PHYSICAL EXAM  Constitutional - laying in bed  Chest - Breathing comfortably on room air   Cardiovascular - S1S2   Extremities - B/L LE in Ace wraps  Neurologic Exam -    follows commands                 Cognitive - Awake, Alert, AAO to self, place, date, year, situation     Communication - Fluent, No dysarthria        Motor -moves all ext 5/5     Sensory - Intact to LT     Psychiatric - Mood stable, Affect WNL  ----------------------------------------------------------------------------------------  ASSESSMENT/PLAN  73yFemale h/o AS, HFrEF, LBBB, DM type 1, CKD, HTN, hypothyroid, chronic lymphedema, LELIA on home O2 with functional deficits after TAVR, with debility   orthostatic hypotension, on midodrine, droxidopa, fludrocortisone   GI bleed, continue to monitor Hb   urinary retention, amoxicillin x 3 days, ID consulted    LUE DVT on eliquis     Rehab - Will continue to follow for ongoing rehab needs and recommendations.    continue bedside therapy, out of bed to chair daily    limited by orthostatic hypotension, only able to sit at side of bed for short period    Recommend ACUTE inpatient rehabilitation for the functional deficits consisting of 3 hours of therapy/day x 2-4 weeks depending on progress at rehabilitation facility, 24 hour RN/daily PMR physician for comorbid medical management. Patient will be able to tolerate 3 hours a day.              35 minutes spent on total encounter  with chart review of PT/OT notes, exam, imaging, counseling and education on inpatient rehabilitation, coordination of care with rehab team and

## 2024-06-03 NOTE — PROGRESS NOTE ADULT - SUBJECTIVE AND OBJECTIVE BOX
Marcial Erickson MD  PGY 1 Department of Internal Medicine        Patient is a 73y old  Female who presents with a chief complaint of Syncope (02 Jun 2024 14:37)      SUBJECTIVE / OVERNIGHT EVENTS: Pt seen and examined. No acute overnight events. Denies fevers, chills, CP, SOB, Abdominal pain, N/V, Diarrhea        MEDICATIONS  (STANDING):  ammonium lactate 12% Lotion 1 Application(s) Topical <User Schedule>  apixaban 5 milliGRAM(s) Oral two times a day  aspirin  chewable 81 milliGRAM(s) Oral daily  atorvastatin 80 milliGRAM(s) Oral at bedtime  calamine/zinc oxide Lotion 1 Application(s) Topical three times a day  dextrose 10% Bolus 125 milliLiter(s) IV Bolus once  dextrose 5%. 1000 milliLiter(s) (100 mL/Hr) IV Continuous <Continuous>  dextrose 5%. 1000 milliLiter(s) (50 mL/Hr) IV Continuous <Continuous>  dextrose 50% Injectable 25 Gram(s) IV Push once  dextrose 50% Injectable 12.5 Gram(s) IV Push once  droxidopa 600 milliGRAM(s) Oral three times a day  ferrous    sulfate 325 milliGRAM(s) Oral two times a day  fludroCORTISONE 0.2 milliGRAM(s) Oral every 12 hours  glucagon  Injectable 1 milliGRAM(s) IntraMuscular once  insulin glargine Injectable (LANTUS) 14 Unit(s) SubCutaneous at bedtime  insulin lispro (ADMELOG) corrective regimen sliding scale   SubCutaneous three times a day before meals  insulin lispro (ADMELOG) corrective regimen sliding scale   SubCutaneous <User Schedule>  insulin lispro Injectable (ADMELOG) 5 Unit(s) SubCutaneous three times a day before meals  levothyroxine 75 MICROGram(s) Oral daily  midodrine. 20 milliGRAM(s) Oral three times a day  Nephro-molly 1 Tablet(s) Oral daily  pantoprazole    Tablet 40 milliGRAM(s) Oral every 24 hours  psyllium Powder 1 Packet(s) Oral daily  triamcinolone 0.1% Ointment 1 Application(s) Topical two times a day    MEDICATIONS  (PRN):  dextrose Oral Gel 15 Gram(s) Oral once PRN Blood Glucose LESS THAN 70 milliGRAM(s)/deciliter      I&O's Summary    02 Jun 2024 07:01  -  03 Jun 2024 07:00  --------------------------------------------------------  IN: 860 mL / OUT: 1820 mL / NET: -960 mL        Vital Signs Last 24 Hrs  T(C): 36.9 (03 Jun 2024 04:42), Max: 36.9 (02 Jun 2024 12:25)  T(F): 98.5 (03 Jun 2024 04:42), Max: 98.5 (03 Jun 2024 04:42)  HR: 87 (03 Jun 2024 04:42) (72 - 87)  BP: 118/53 (03 Jun 2024 04:42) (118/53 - 147/68)  BP(mean): --  RR: 18 (03 Jun 2024 04:42) (18 - 18)  SpO2: 95% (03 Jun 2024 04:42) (95% - 100%)    Parameters below as of 03 Jun 2024 04:42  Patient On (Oxygen Delivery Method): room air        CAPILLARY BLOOD GLUCOSE      POCT Blood Glucose.: 179 mg/dL (03 Jun 2024 02:06)  POCT Blood Glucose.: 139 mg/dL (02 Jun 2024 22:26)  POCT Blood Glucose.: 76 mg/dL (02 Jun 2024 21:25)  POCT Blood Glucose.: 159 mg/dL (02 Jun 2024 17:02)  POCT Blood Glucose.: 156 mg/dL (02 Jun 2024 12:48)  POCT Blood Glucose.: 216 mg/dL (02 Jun 2024 08:39)      PHYSICAL EXAM:  GENERAL: NAD,   HEAD:  Atraumatic, Normocephalic  EYES: EOMI, conjunctiva and sclera clear  ENMT: Moist mucous membranes  CHEST/LUNG: Clear to auscultation bilaterally; No rales, rhonchi, wheezing, or rubs  HEART: Regular rate and rhythm, systolic murmur  ABDOMEN: Soft, Nontender, Nondistended; Bowel sounds present  EXTREMITIES:  2+ Peripheral Pulses, 1-2+ b/l edema up to thighs in ace wrap. 3+ edema and erythema to LUE but no warmth or tenderness. Full ROM with strength/sensation/pulses intact  SKIN: erythematous, macular rash to anterior chest wall, neck, and upper extremities improving  NERVOUS SYSTEM:  Alert, no focal deficits  PSYCH:  Appropriate affect       LABS:                        10.5   7.76  )-----------( 151      ( 03 Jun 2024 06:41 )             33.5     Auto Eosinophil # x     / Auto Eosinophil % x     / Auto Neutrophil # x     / Auto Neutrophil % x     / BANDS % x                            10.4   8.11  )-----------( 166      ( 02 Jun 2024 07:05 )             34.2     Auto Eosinophil # x     / Auto Eosinophil % x     / Auto Neutrophil # x     / Auto Neutrophil % x     / BANDS % x                            10.4   8.42  )-----------( 186      ( 01 Jun 2024 12:06 )             33.5     Auto Eosinophil # x     / Auto Eosinophil % x     / Auto Neutrophil # x     / Auto Neutrophil % x     / BANDS % x        06-02    138  |  104  |  32<H>  ----------------------------<  200<H>  4.5   |  18<L>  |  1.77<H>  06-01    133<L>  |  101  |  34<H>  ----------------------------<  247<H>  3.9   |  20<L>  |  1.95<H>    Ca    8.5      02 Jun 2024 07:05  Mg     2.0     06-02  Phos  3.7     06-02          Urinalysis Basic - ( 02 Jun 2024 07:05 )    Color: x / Appearance: x / SG: x / pH: x  Gluc: 200 mg/dL / Ketone: x  / Bili: x / Urobili: x   Blood: x / Protein: x / Nitrite: x   Leuk Esterase: x / RBC: x / WBC x   Sq Epi: x / Non Sq Epi: x / Bacteria: x            RADIOLOGY & ADDITIONAL TESTS:    Imaging Personally Reviewed:    Consultant(s) Notes Reviewed:      Care Discussed with Consultants/Other Providers:   Marcial Erickson MD  PGY 1 Department of Internal Medicine        Patient is a 73y old  Female who presents with a chief complaint of Syncope (02 Jun 2024 14:37)      SUBJECTIVE / OVERNIGHT EVENTS: Pt seen and examined. No acute overnight events. She states ROM of her L arm is improved today. Denies fevers, chills, CP, SOB, Abdominal pain, N/V, Diarrhea        MEDICATIONS  (STANDING):  ammonium lactate 12% Lotion 1 Application(s) Topical <User Schedule>  apixaban 5 milliGRAM(s) Oral two times a day  aspirin  chewable 81 milliGRAM(s) Oral daily  atorvastatin 80 milliGRAM(s) Oral at bedtime  calamine/zinc oxide Lotion 1 Application(s) Topical three times a day  dextrose 10% Bolus 125 milliLiter(s) IV Bolus once  dextrose 5%. 1000 milliLiter(s) (100 mL/Hr) IV Continuous <Continuous>  dextrose 5%. 1000 milliLiter(s) (50 mL/Hr) IV Continuous <Continuous>  dextrose 50% Injectable 25 Gram(s) IV Push once  dextrose 50% Injectable 12.5 Gram(s) IV Push once  droxidopa 600 milliGRAM(s) Oral three times a day  ferrous    sulfate 325 milliGRAM(s) Oral two times a day  fludroCORTISONE 0.2 milliGRAM(s) Oral every 12 hours  glucagon  Injectable 1 milliGRAM(s) IntraMuscular once  insulin glargine Injectable (LANTUS) 14 Unit(s) SubCutaneous at bedtime  insulin lispro (ADMELOG) corrective regimen sliding scale   SubCutaneous three times a day before meals  insulin lispro (ADMELOG) corrective regimen sliding scale   SubCutaneous <User Schedule>  insulin lispro Injectable (ADMELOG) 5 Unit(s) SubCutaneous three times a day before meals  levothyroxine 75 MICROGram(s) Oral daily  midodrine. 20 milliGRAM(s) Oral three times a day  Nephro-molly 1 Tablet(s) Oral daily  pantoprazole    Tablet 40 milliGRAM(s) Oral every 24 hours  psyllium Powder 1 Packet(s) Oral daily  triamcinolone 0.1% Ointment 1 Application(s) Topical two times a day    MEDICATIONS  (PRN):  dextrose Oral Gel 15 Gram(s) Oral once PRN Blood Glucose LESS THAN 70 milliGRAM(s)/deciliter      I&O's Summary    02 Jun 2024 07:01  -  03 Jun 2024 07:00  --------------------------------------------------------  IN: 860 mL / OUT: 1820 mL / NET: -960 mL        Vital Signs Last 24 Hrs  T(C): 36.9 (03 Jun 2024 04:42), Max: 36.9 (02 Jun 2024 12:25)  T(F): 98.5 (03 Jun 2024 04:42), Max: 98.5 (03 Jun 2024 04:42)  HR: 87 (03 Jun 2024 04:42) (72 - 87)  BP: 118/53 (03 Jun 2024 04:42) (118/53 - 147/68)  BP(mean): --  RR: 18 (03 Jun 2024 04:42) (18 - 18)  SpO2: 95% (03 Jun 2024 04:42) (95% - 100%)    Parameters below as of 03 Jun 2024 04:42  Patient On (Oxygen Delivery Method): room air        CAPILLARY BLOOD GLUCOSE      POCT Blood Glucose.: 179 mg/dL (03 Jun 2024 02:06)  POCT Blood Glucose.: 139 mg/dL (02 Jun 2024 22:26)  POCT Blood Glucose.: 76 mg/dL (02 Jun 2024 21:25)  POCT Blood Glucose.: 159 mg/dL (02 Jun 2024 17:02)  POCT Blood Glucose.: 156 mg/dL (02 Jun 2024 12:48)  POCT Blood Glucose.: 216 mg/dL (02 Jun 2024 08:39)      PHYSICAL EXAM:  GENERAL: NAD,   HEAD:  Atraumatic, Normocephalic  EYES: EOMI, conjunctiva and sclera clear  ENMT: Moist mucous membranes  CHEST/LUNG: Clear to auscultation bilaterally; No rales, rhonchi, wheezing, or rubs  HEART: Regular rate and rhythm, systolic murmur  ABDOMEN: Soft, Nontender, Nondistended; Bowel sounds present  EXTREMITIES:  2+ Peripheral Pulses, 1-2+ b/l edema up to thighs in ace wrap. LUE in dressing with edema and erythema but no warmth or tenderness. Full ROM with strength/sensation/pulses intact  SKIN: erythematous, macular rash to anterior chest wall, neck, and upper extremities improving  NERVOUS SYSTEM:  Alert, no focal deficits  PSYCH:  Appropriate affect       LABS:                        10.5   7.76  )-----------( 151      ( 03 Jun 2024 06:41 )             33.5     Auto Eosinophil # x     / Auto Eosinophil % x     / Auto Neutrophil # x     / Auto Neutrophil % x     / BANDS % x                            10.4   8.11  )-----------( 166      ( 02 Jun 2024 07:05 )             34.2     Auto Eosinophil # x     / Auto Eosinophil % x     / Auto Neutrophil # x     / Auto Neutrophil % x     / BANDS % x                            10.4   8.42  )-----------( 186      ( 01 Jun 2024 12:06 )             33.5     Auto Eosinophil # x     / Auto Eosinophil % x     / Auto Neutrophil # x     / Auto Neutrophil % x     / BANDS % x        06-02    138  |  104  |  32<H>  ----------------------------<  200<H>  4.5   |  18<L>  |  1.77<H>  06-01    133<L>  |  101  |  34<H>  ----------------------------<  247<H>  3.9   |  20<L>  |  1.95<H>    Ca    8.5      02 Jun 2024 07:05  Mg     2.0     06-02  Phos  3.7     06-02          Urinalysis Basic - ( 02 Jun 2024 07:05 )    Color: x / Appearance: x / SG: x / pH: x  Gluc: 200 mg/dL / Ketone: x  / Bili: x / Urobili: x   Blood: x / Protein: x / Nitrite: x   Leuk Esterase: x / RBC: x / WBC x   Sq Epi: x / Non Sq Epi: x / Bacteria: x            RADIOLOGY & ADDITIONAL TESTS:    Imaging Personally Reviewed:    Consultant(s) Notes Reviewed:      Care Discussed with Consultants/Other Providers:

## 2024-06-03 NOTE — CHART NOTE - NSCHARTNOTEFT_GEN_A_CORE
in viw of hs of remote penicilli n and rash allo katairna her body, we will use 3 days of macrobid  and avoid ampicillin

## 2024-06-03 NOTE — CONSULT NOTE ADULT - SUBJECTIVE AND OBJECTIVE BOX
Patient is a 73y old  Female who presents with a chief complaint of Syncope (03 Jun 2024 09:13)    HPI:  73F w HFrEF (EF 30%), mod AS, known LBBB, HTN, IDDM1, CKD, hypothyroidism, chronic lymphedema, suspected OHS/LELIA on 3L NC home O2 p/w 1 episode of syncope. Recent admission at [facility] (3/29-4/5) for ADHF and DUNCAN s/p diuresis, discharged with new home O2 3L NC suspecting OHS/LELIA pending outpatient w/u. Since discharge a week ago, she has been staying at home with   Pt had sob walking to car. Once in car, she was still breathing heavily. Partner noticed pt was not responding to verbal stimuli for 10-15sec and witnessed R arm jerking. Pt gained consciousness quickly without postictal symptoms. Pt went to Cardiologist Dr. Nathan who recommended pt to come to ED. No fever, cp, abd pain, n/v. Leg swelling is improved from prior. Pt on torsemide 40mg which she has been taking. Pt was discharged on 3LNC which she is currently on. Patient reports she did not take Farxiga that she was discharged on as she was concerned about side effects.     In ED, patient was found afebrile, hemodynamically stable, breathing well on 3L NC. Initial labs notable for mild hyponatremia, DUNCAN on CKD, elevated proBNP and elevated pCO2 both improved from prior. (12 Apr 2024 16:31)      PAST MEDICAL & SURGICAL HISTORY:  Hypertension      Diabetes      Lymphedema      H/O left bundle branch block      History of left bundle branch block (LBBB)      Systolic heart failure, chronic      Type 1 diabetes      H/O cataract  2020      History of surgical removal of meniscus of knee  left in 1971      Frozen shoulder  2000      H/O Achilles tendon repair  lengthened bilaterally, 2000      Fractured skull  1968          Social history:    FAMILY HISTORY:  Family history of CVA  mom, age 57      REVIEW OF SYSTEMS  General:	Denies any malaise fatigue or chills. Fevers absent    Skin:No rash  	  Ophthalmologic:Denies any visual complaints,discharge redness or photophobia  	  ENMT:No nasal discharge,headache,sinus congestion or throat pain.No dental complaints    Respiratory and Thorax:No cough,sputum or chest pain.Denies shortness of breath  	  Cardiovascular:	No chest pain,palpitaions or dizziness    Gastrointestinal:	NO nausea,abdominal pain or diarrhea.    Genitourinary:	No dysuria,frequency. No flank pain    Musculoskeletal:	No joint swelling or pain.No weakness    Neurological:No confusion,diziness.No extremity weakness.No bladder or bowel incontinence	         Intolerances        Antimicrobials:          Vital Signs Last 24 Hrs  T(C): 36.9 (03 Jun 2024 04:42), Max: 36.9 (02 Jun 2024 12:25)  T(F): 98.5 (03 Jun 2024 04:42), Max: 98.5 (03 Jun 2024 04:42)  HR: 87 (03 Jun 2024 04:42) (72 - 87)  BP: 118/53 (03 Jun 2024 04:42) (118/53 - 147/68)  BP(mean): --  RR: 18 (03 Jun 2024 04:42) (18 - 18)  SpO2: 95% (03 Jun 2024 04:42) (95% - 100%)    Parameters below as of 03 Jun 2024 04:42  Patient On (Oxygen Delivery Method): room air            Eyes:PERRL EOMI.NO discharge or conjunctival injection    ENMT:No sinus tenderness.No thrush.No pharyngeal exudate or erythema.Fair dental hygiene    Neck:Supple,No LN,no JVD      Respiratory:Good air entry bilaterally,CTA    Cardiovascular:S1 S2 wnl, No murmurs,rub or gallops    Gastrointestinal:Soft BS(+) no tenderness no masses ,No rebound or guarding    Genitourinary:No CVA tendereness     Rectal:    Extremities:No cyanosis,clubbing or edema.                                      10.5   7.76  )-----------( 151      ( 03 Jun 2024 06:41 )             33.5         06-03    137  |  104  |  32<H>  ----------------------------<  166<H>  4.5   |  18<L>  |  2.06<H>    Ca    8.1<L>      03 Jun 2024 06:40  Phos  4.0     06-03  Mg     2.0     06-03        RECENT CULTURES:  05-29 @ 13:17  Catheterized Catheterized  Escherichia coli  Enterococcus faecalis (vancomycin resistant)  Escherichia coli  CATRACHITA    >100,000 CFU/ml Escherichia coli  >100,000 CFU/ml Enterococcus faecalis (vancomycin resistant)  --      MICROBIOLOGY:  Culture Results:   >100,000 CFU/ml Escherichia coli  >100,000 CFU/ml Enterococcus faecalis (vancomycin resistant) (05-29 @ 13:17)          Radiology:      Assessment:        Recommendations and Plan:    Pager [phone]  After 5 pm/weekends or if no response :[phone]

## 2024-06-03 NOTE — CONSULT NOTE ADULT - CONSULT REASON
Adrenal nodule
rash
rash
rectal bleed
Rehabilitation consult
foot wound
uti
aortic stenosis
wound
2:1 AVB, LBBB
Non-tunneled HD catheter placement
abn creatinine
concern for drug rash
Syncope

## 2024-06-03 NOTE — PHARMACOTHERAPY INTERVENTION NOTE - COMMENTS
Counseled patient on Eliquis (brand/generic), indication, directions for use (2 tablets (10mg) twice daily for 7 days, then 1 tablet (5mg) twice daily thereafter) and possible side effects (bleeding). Counseled patient on taking acetaminophen over the counter if needed and to avoid NSAID medications like Advil or Aleve since they can increase bleeding risk. Patient was provided with a medication card for their new medication. Patient questions and concerns were answered and addressed. Patient demonstrated understanding. Patient understood importance of compliance and to follow up with cardiologist once discharged.    Mirza Astudillo, PharmD  Transitions of Care Pharmacist  Available on [employer] Teams  Spectra #[phone]    
EVELYN LOPEZ, 73y Female with concern for possible UTI on admission, was started on ceftriaxone 1 gram daily. Urine culture was taken and grew >100K E. coli and E. faecalis. ID suggested to start the patient on amoxicillin, however they have a listed allergy of anaphylaxis per chart.    On further discussion with team, it appears the reaction to penicillin occurred 10-15 years prior.    Recommendation(s):  After discussing with ID, it appears the patient has an active rash attributable to contrast, and to not further complicate matters by introducing another possible allergic medication I suggested we can use nitrofurantoin since the case is more likely cystitis rather than an upper urinary tract infection/pyelonephritis. ID agreed, patient's creatinine clearance is >30 mL/min, will give 3 day course to cover all pathogens from the urine culture.    With kind regards,  Juanjo Brian, PharmD, BCIDP  Infectious Diseases Clinical Pharmacist  Available on [employer] Teams  .
Confirmed home medications with patient and pharmacy, updated in Outpatient Medication Review.    Home Medications:  aspirin 81 mg oral tablet, chewable: 1 tab(s) orally once a day  atorvastatin 80 mg oral tablet: 1 tab(s) orally once a day (at bedtime)  ferrous sulfate 324 mg (65 mg elemental iron) oral tablet: 1 tab(s) orally 2 times a day  Lantus 100 units/mL subcutaneous solution: 37 unit(s) subcutaneous once a day (at bedtime)  levothyroxine 75 mcg (0.075 mg) oral tablet: 1 tab(s) orally once a day  metoprolol succinate 100 mg oral tablet, extended release: 1 tab(s) orally once a day  torsemide 20 mg oral tablet: 1 tab(s) orally 2 times a day    Changed:  torsemide 40 mg oral tablet: 1 tab once a day--> 20 mg oral tablet: 1 tab(s) orally 2 times a day  Lantus 100 units/mL subcutaneous solution: 33 units subcutaneous once a day --> 37 unit(s) subcutaneous once a day (at bedtime)    Added:  Multiple Vitamins oral tablet: 1 tab(s) orally once a day  NovoLOG FlexPen 100 units/mL injectable solution: injectable 3 times a day per sliding scale regimen. MAX dose 40 units  potassium chloride 10 mEq oral tablet, extended release: 2 tab(s) orally once a day  cholecalciferol 100 mcg (4000 intl units) oral tablet: 1 tab(s) orally once a day    Mirza Astudillo PharmD  Transitions of Care Pharmacist  Available on [employer] Teams  Spectra #[phone]

## 2024-06-03 NOTE — CONSULT NOTE ADULT - PROVIDER SPECIALTY LIST ADULT
Allergy/Immunology
Electrophysiology
Structural Heart
Wound Care
Intervent Radiology
Nephrology
Podiatry
Gastroenterology
Rehab Medicine
Dermatology
Infectious Disease
Infectious Disease
Cardiology
Endocrinology

## 2024-06-03 NOTE — CONSULT NOTE ADULT - CONSULT REQUESTED DATE/TIME
13-Apr-2024 12:46
15-Apr-2024 15:28
18-Apr-2024 09:00
18-Apr-2024 10:13
16-Apr-2024 12:48
03-Jun-2024
13-Apr-2024 06:46
15-Apr-2024 10:17
17-Apr-2024
24-May-2024 16:09
30-May-2024 09:41
28-Apr-2024 13:08
17-Apr-2024 14:10
13-Apr-2024 08:10

## 2024-06-03 NOTE — PROGRESS NOTE ADULT - ASSESSMENT
73F w HFrEF (EF 30%), mod AS, known LBBB, HTN, IDDM1, CKD, hypothyroidism, chronic lymphedema, suspected OHS/LELIA on 3L NC home O2 p/w 1 episode of syncope. Recent admission at [facility] (3/29-4/5) for ADHF and DUNCAN s/p diuresis, discharged with new home O2 3L NC suspecting OHS/LELIA pending outpatient w/u. Since discharge a week ago, she has been staying at home with   Pt had sob walking to car. Once in car, she was still breathing heavily. Partner noticed pt was not responding to verbal stimuli for 10-15sec and witnessed R arm jerking. Pt gained consciousness quickly without postictal symptoms. Pt went to Cardiologist Dr. Nathan who recommended pt to come to ED. No fever, cp, abd pain, n/v. Leg swelling is improved from prior. Pt on torsemide 40mg which she has been taking. Pt was discharged on 3LNC which she is currently on. Patient reports she did not take Farxiga that she was discharged on as she was concerned about side effects.     In ED, patient was found afebrile, hemodynamically stable, breathing well on 3L NC. Initial labs notable for mild hyponatremia, DUNCAN on CKD, elevated proBNP and elevated pCO2 both improved from prior.  pt also noticed with abn creatinine. had severe AS had ct scan with IV contrast had contrast nephropathy and hypotension ---> CRRT required       1- CKD IV  2- CHF   3- lymphedema   4- severe AS  s/p tavr 4/24  5- hypotension orthostatic       creatinine is overall steady in this range  her edema has been worsening  orthostatic hypotension,   midodrine 20 mg tid,  northera 600 mg TID   florinef 0.2mg bid   continue to check orthostatic bp  also PT for conditioning   anemia, hb higher  now off epogen    non-oliguric,   hardy re inserted for urinary retention  ID consulted for UTI, awaiting recommendations  strict I/O

## 2024-06-03 NOTE — PROGRESS NOTE ADULT - PROBLEM SELECTOR PLAN 7
EF from 4/2024 (post-TAVR) w/ EF 25%, global LV hypokinesis, moderately dilated LV, grade 2 LV diastolic dysfunction, RA moderately dilated.  Repeat TTE with reduced LV function.  TAVR remains well seated  - cards following, will f/u with cardiology regarding resuming GDMT  - per cards, overnight telemetry 6/1 consistent with afib, recommending to continue monitoring on tele  - continue midodrine 15mg TID   - Continue atorvastatin 80mg daily  - hold home torsemide 20mg qd, hold home eplerenone 25mg qd, hold home metoprolol succinate 100mg qd  - f/u repeat limited TTE  - f/u w/ EP outpatient for CRT.    #Aortic Stenosis  Presented for syncope secondary to severe AS, s/p TAVR on 4/24 c/b by VT -> shock -> VFib -> shock.  Course complicated w/ symptomatic bradycardia, requiring TVP, now s/p micra PPM. TAVR well seated on recent TTE without regurgitation  - continue aspirin 81mg daily.

## 2024-06-03 NOTE — PROGRESS NOTE ADULT - PROBLEM SELECTOR PLAN 2
5/29 retaining urine requiring straight cath. On straight cath x2 urine was cloudy/milky in appearance. Hardy catheter replaced. UA with >998 WBC, LE, large blood, >36 epithelial cells concerning for UTI. She reports no other urinary sx.  UCx: E. Coli and VRE E. Faecalis   -Per ID, recommend monitoring off abx as this is most likely asymptomatic bacteriuria   -s/p CTX (5/29 - 5/31)  -hardy placed 5/29. 5/29 retaining urine requiring straight cath. On straight cath x2 urine was cloudy/milky in appearance. Hardy catheter replaced. UA with >998 WBC, LE, large blood, >36 epithelial cells concerning for UTI. She reports no other urinary sx.  UCx: E. Coli and VRE E. Faecalis   -continue macrobid (6/3 - 6/5)  -s/p CTX (5/29 - 5/31)  -hardy placed 5/29.

## 2024-06-03 NOTE — PROGRESS NOTE ADULT - SUBJECTIVE AND OBJECTIVE BOX
[facility], [place] [facility] - Howard Kimble, Carlos Luong, Herman Alston  Office Number:  [phone]    Patient resting comfortably in bed in NAD.  Laying flat with no respiratory distress.  No complaints of chest pain, dyspnea, palpitations, PND, or orthopnea.  Still orthostatic  De La Garza still in place, failed TOV    ROS: negative unless otherwise mentioned.    Telemetry: SR first degree    MEDICATIONS  (STANDING):  ammonium lactate 12% Lotion 1 Application(s) Topical <User Schedule>  apixaban 5 milliGRAM(s) Oral two times a day  aspirin  chewable 81 milliGRAM(s) Oral daily  atorvastatin 80 milliGRAM(s) Oral at bedtime  calamine/zinc oxide Lotion 1 Application(s) Topical three times a day  dextrose 10% Bolus 125 milliLiter(s) IV Bolus once  dextrose 5%. 1000 milliLiter(s) (100 mL/Hr) IV Continuous <Continuous>  dextrose 5%. 1000 milliLiter(s) (50 mL/Hr) IV Continuous <Continuous>  dextrose 50% Injectable 25 Gram(s) IV Push once  dextrose 50% Injectable 12.5 Gram(s) IV Push once  droxidopa 600 milliGRAM(s) Oral three times a day  ferrous    sulfate 325 milliGRAM(s) Oral two times a day  fludroCORTISONE 0.2 milliGRAM(s) Oral every 12 hours  glucagon  Injectable 1 milliGRAM(s) IntraMuscular once  insulin glargine Injectable (LANTUS) 14 Unit(s) SubCutaneous at bedtime  insulin lispro (ADMELOG) corrective regimen sliding scale   SubCutaneous three times a day before meals  insulin lispro (ADMELOG) corrective regimen sliding scale   SubCutaneous <User Schedule>  insulin lispro Injectable (ADMELOG) 5 Unit(s) SubCutaneous three times a day before meals  levothyroxine 75 MICROGram(s) Oral daily  midodrine. 20 milliGRAM(s) Oral three times a day  Nephro-molly 1 Tablet(s) Oral daily  pantoprazole    Tablet 40 milliGRAM(s) Oral every 24 hours  psyllium Powder 1 Packet(s) Oral daily  triamcinolone 0.1% Ointment 1 Application(s) Topical two times a day    MEDICATIONS  (PRN):  dextrose Oral Gel 15 Gram(s) Oral once PRN Blood Glucose LESS THAN 70 milliGRAM(s)/deciliter      Allergies    contrast media (iodine-based) (Fever; Vomiting; Flushing; Hypotension; Rash; Stomach Upset)  Ativan (Rash; Urticaria)  penicillins (Hives (Mod to Severe); Anaphylaxis (Mod to Severe); Short breath (Mod to Severe); Angioedema (Mod to Severe))    Intolerances        Vital Signs Last 24 Hrs  T(C): 36.9 (03 Jun 2024 04:42), Max: 36.9 (02 Jun 2024 12:25)  T(F): 98.5 (03 Jun 2024 04:42), Max: 98.5 (03 Jun 2024 04:42)  HR: 87 (03 Jun 2024 04:42) (72 - 87)  BP: 118/53 (03 Jun 2024 04:42) (118/53 - 147/68)  BP(mean): --  RR: 18 (03 Jun 2024 04:42) (18 - 18)  SpO2: 95% (03 Jun 2024 04:42) (95% - 100%)    Parameters below as of 03 Jun 2024 04:42  Patient On (Oxygen Delivery Method): room air        I&O's Summary    02 Jun 2024 07:01  -  03 Jun 2024 07:00  --------------------------------------------------------  IN: 1100 mL / OUT: 1820 mL / NET: -720 mL        ON EXAM:    eneral: NAD, awake and alert, oriented x 3  HEENT: Mucous membranes are moist, anicteric  Lungs: Non-labored, breath sounds are clear bilaterally, No wheezing, rales or rhonchi  Cardiovascular: Regular, S1 and S2, 2/6 harsh systolic murmur best heard RUSB  Gastrointestinal: Bowel Sounds present, soft, nontender.   Lymph: chronic peripheral edema. No lymphadenopathy.  LABS: All Labs Reviewed:                        10.5   7.76  )-----------( 151      ( 03 Jun 2024 06:41 )             33.5                         10.4   8.11  )-----------( 166      ( 02 Jun 2024 07:05 )             34.2                         10.4   8.42  )-----------( 186      ( 01 Jun 2024 12:06 )             33.5     03 Jun 2024 06:40    137    |  104    |  32     ----------------------------<  166    4.5     |  18     |  2.06   02 Jun 2024 07:05    138    |  104    |  32     ----------------------------<  200    4.5     |  18     |  1.77   01 Jun 2024 12:06    133    |  101    |  34     ----------------------------<  247    3.9     |  20     |  1.95     Ca    8.1        03 Jun 2024 06:40  Ca    8.5        02 Jun 2024 07:05  Ca    8.4        01 Jun 2024 12:06  Phos  4.0       03 Jun 2024 06:40  Phos  3.7       02 Jun 2024 07:05  Phos  3.6       01 Jun 2024 12:06  Mg     2.0       03 Jun 2024 06:40  Mg     2.0       02 Jun 2024 07:05  Mg     1.8       01 Jun 2024 12:06            Blood Culture: Organism Escherichia coli  Gram Stain Blood -- Gram Stain --  Specimen Source Catheterized Catheterized  Culture-Blood --         VA Duplex Upper Ext Vein Scan, Left (05.31.24 @ 17:07) >  FINDINGS:    The left internal jugular and brachial veins are patent and compressible  where applicable.    Persistent DVT noted in the left subclavian and axillaryveins.    IMPRESSION:  Persistent DVT noted in the left subclavian and axillary veins.      TTE W or WO Ultrasound Enhancing Agent (05.21.24 @ 16:05) >  CONCLUSIONS:      1. Global left ventricular hypokinesis.   2. Mildly enlarged right ventricular cavity size and probably normal systolic function. Tricuspid annular plane systolic excursion (TAPSE) is 2.6 cm (normal >=1.7 cm).   3. A Benitez Bakari (TAVR) valve replacement is present in the aortic position The prosthetic valve iswell seated with normal function. No intravalvular regurgitation No paravalvular regurgitation.   4. Compared to the transthoracic echocardiogram performed on 4/25/2024, there have been no significant interval changes.   5. Left ventricular endocardium is not well visualized; however, the left ventricular systolic function appears severely reduced.   6. Technically difficult image quality.

## 2024-06-03 NOTE — CONSULT NOTE ADULT - CONSULT REQUESTED BY NAME
Cardiology/Cherise
IM
Josiah ford
Dr Alston
Cindy Sauceda
Dr. Martell
JEOVANNY
Vic Hayden
attending physician
Dr Morgan
GI fellow
im
laura graham
Dr Rahman

## 2024-06-03 NOTE — CONSULT NOTE ADULT - ASSESSMENT
73 year old with multiple ,medical problems hospitalized for several weeks due to CHF GIB, STELLA, DM syncope    pt has no symptoms of UTI. No fevr or chills but did have to have a De La Garza placed  urine cultures have grown ecoli and  enterococcus    although uncommon, UTI can cause urinary retention    would treat for three days with amoxicillin 500 mg bid which will cover both organism

## 2024-06-03 NOTE — PROGRESS NOTE ADULT - ASSESSMENT
73F w HFrEF (EF 30%), mod AS, known LBBB, HTN, IDDM1, CKD, hypothyroidism, chronic lymphedema, p/w 1 episode of syncope with R arm jerking.     #Syncope-Aortic Stenosis  - Known Aortic Stenosis likely Severe in setting of decreased LVEF  - s/p tavr on 4/24 c/b VT -> shock -> VFib -> shock  - hypoxic/congested, and was intubated, now extubated on 4/25 in the evening, on NC   - on 4/25 with worsening bradycardia, and now s/p TVP placement followed by AV Micra placement with removal of TVP  - Remains in Sinus Rhythm/known lbbb with intermittent   - pressors off and now on high dose midodrine, droxidopa and florinef in the setting of orthostasis. Droxidopa increased.   - Repeat TTE on 5/21 with global LV dysfunction severely decreased. Well seated TAVR   - to consider CRT-D in the future  - Cannot initiate GDMT due to orthostasis  - would try to mobilize as best as possible and monitor orthostatics, hopefully standing bp can be kept >100.   - BP did not drop when sitting up as of 5/31.  - cont asa and statin  - Hgb stable at 10.4      #Chronic Systolic HF (EF 30%), mod to severe AS, HTN, LBBB, Lymphedema   - Has known systolic HF and AS.    - Repeat TTE showed EF 30%, mild-mod LVH, mildly reduced RVF, mod-severe AS (.61 cmsq), mod pHTN  - On home Torsemide 20 mg daily, Eplerenone 25 mg daily, and Toprol  mg daily, all currently on hold  - On Midodrine 20mg TID for orthostatic hypotension  - Droxidopa now initiated as well for significant orthostasis upto 600mg TID  - Unable to initiate GDMT at this time due to significant orthostatic hypotension  - initially CVVHD, then HD  - HD now on hold, as she is urinating and creatinine remains stable,   - Creatinine stable  - prn iv bumex  - renal fu  - Diagnosed with UE DVT and now on full loading dose of Eliquis  - PT and Bed to chair as much as possible    #Orthostatic hypotension.   - Likely in setting of deconditioning.   - would attempt orthostatics daily.  Hoping to wean off midodrine in order to restart GDMT. Orthostatics improving from supine to seated, but still problematic with standing. Remains orthostatic and symptomatic on standing  -Repeat othostatics-improved-Florinef decreased 2/2 increased edema, midodrine increased to 20mg TID    # Atrial Fibrillation  -Reported episode over the weekend  -Is on AC  -Continue telemetry    #UTI  - Abx as per primary

## 2024-06-03 NOTE — PROGRESS NOTE ADULT - SUBJECTIVE AND OBJECTIVE BOX
[facility]   [phone]  RENAL FOLLOW UP NOTE  --------------------------------------------------------------------------------  Chief Complaint:    24 hour events/subjective:    patient seen and examined  persistent orthostasis    PAST HISTORY  --------------------------------------------------------------------------------  No significant changes to PMH, PSH, FHx, SHx, unless otherwise noted    ALLERGIES & MEDICATIONS  --------------------------------------------------------------------------------  Allergies    contrast media (iodine-based) (Fever; Vomiting; Flushing; Hypotension; Rash; Stomach Upset)  Ativan (Rash; Urticaria)  penicillins (Hives (Mod to Severe); Anaphylaxis (Mod to Severe); Short breath (Mod to Severe); Angioedema (Mod to Severe))    Intolerances      Standing Inpatient Medications  ammonium lactate 12% Lotion 1 Application(s) Topical <User Schedule>  apixaban 5 milliGRAM(s) Oral two times a day  aspirin  chewable 81 milliGRAM(s) Oral daily  atorvastatin 80 milliGRAM(s) Oral at bedtime  calamine/zinc oxide Lotion 1 Application(s) Topical three times a day  dextrose 10% Bolus 125 milliLiter(s) IV Bolus once  dextrose 5%. 1000 milliLiter(s) IV Continuous <Continuous>  dextrose 5%. 1000 milliLiter(s) IV Continuous <Continuous>  dextrose 50% Injectable 25 Gram(s) IV Push once  dextrose 50% Injectable 12.5 Gram(s) IV Push once  droxidopa 600 milliGRAM(s) Oral three times a day  ferrous    sulfate 325 milliGRAM(s) Oral two times a day  fludroCORTISONE 0.2 milliGRAM(s) Oral every 12 hours  glucagon  Injectable 1 milliGRAM(s) IntraMuscular once  insulin glargine Injectable (LANTUS) 14 Unit(s) SubCutaneous at bedtime  insulin lispro (ADMELOG) corrective regimen sliding scale   SubCutaneous three times a day before meals  insulin lispro (ADMELOG) corrective regimen sliding scale   SubCutaneous <User Schedule>  insulin lispro Injectable (ADMELOG) 5 Unit(s) SubCutaneous three times a day before meals  levothyroxine 75 MICROGram(s) Oral daily  midodrine. 20 milliGRAM(s) Oral three times a day  Nephro-molly 1 Tablet(s) Oral daily  nitrofurantoin monohydrate/macrocrystals (MACROBID) 100 milliGRAM(s) Oral two times a day  pantoprazole    Tablet 40 milliGRAM(s) Oral every 24 hours  psyllium Powder 1 Packet(s) Oral daily  triamcinolone 0.1% Ointment 1 Application(s) Topical two times a day    PRN Inpatient Medications  dextrose Oral Gel 15 Gram(s) Oral once PRN      REVIEW OF SYSTEMS  --------------------------------------------------------------------------------    Gen: denies fevers/chills,  CVS: denies chest pain/palpitations  Resp: denies SOB/Cough  GI: Denies N/V/Abd pain  : Denies dysuria    VITALS/PHYSICAL EXAM  --------------------------------------------------------------------------------  T(C): 37 (06-03-24 @ 11:07), Max: 37 (06-03-24 @ 11:07)  HR: 87 (06-03-24 @ 04:42) (72 - 87)  BP: 118/53 (06-03-24 @ 04:42) (118/53 - 147/68)  RR: 18 (06-03-24 @ 04:42) (18 - 18)  SpO2: 95% (06-03-24 @ 04:42) (95% - 100%)  Wt(kg): --        06-02-24 @ 07:01  -  06-03-24 @ 07:00  --------------------------------------------------------  IN: 1100 mL / OUT: 1820 mL / NET: -720 mL    06-03-24 @ 07:01  -  06-03-24 @ 14:42  --------------------------------------------------------  IN: 100 mL / OUT: 0 mL / NET: 100 mL      Physical Exam:  	              Gen: comfortable appearing   	Pulm: decrease bs  no rales or ronchi or wheezing  	CV: No JVD. RRR, S1S2; no rub II/VI M   	Abd: +BS, soft, nontender/nondistended, obese   	UE: Warm, no cyanosis  no clubbing, no edema  	LE: Warm, no cyanosis  no clubbing, 2- edema, B/L ACE bandage  	Neuro: alert and oriented     LABS/STUDIES  --------------------------------------------------------------------------------              10.5   7.76  >-----------<  151      [06-03-24 @ 06:41]              33.5     137  |  104  |  32  ----------------------------<  166      [06-03-24 @ 06:40]  4.5   |  18  |  2.06        Ca     8.1     [06-03-24 @ 06:40]      Mg     2.0     [06-03-24 @ 06:40]      Phos  4.0     [06-03-24 @ 06:40]            Creatinine Trend:  SCr 2.06 [06-03 @ 06:40]  SCr 1.77 [06-02 @ 07:05]  SCr 1.95 [06-01 @ 12:06]  SCr 2.50 [05-30 @ 06:51]  SCr 2.39 [05-29 @ 07:02]                PTH -- (Ca 8.4)      [04-19-24 @ 17:54]   109  TSH 10.20      [05-28-24 @ 06:42]  Lipid: chol 74, TG 70, HDL 33, LDL --      [04-13-24 @ 07:05]

## 2024-06-04 NOTE — PROGRESS NOTE ADULT - NSPROGADDITIONALINFOA_GEN_ALL_CORE
Contact via [employer] Teams during business hours  To reach covering provider access AMION via sun[employer] tools  For Urgent matters/after-hours/weekends/holidays please page endocrine fellow on call   For nonurgent matters please email [email or web address]    Please note that this patient may be followed by different provider tomorrow.  Notify endocrine 24 hours prior to discharge for final recommendations

## 2024-06-04 NOTE — PROGRESS NOTE ADULT - ASSESSMENT
73F w/h/o of T1DM with unknown control due to CKD and anemia of chronic disease. DM c/b CKD. Also h/o HFrEF (EF 30%), mod AS, known LBBB, HTN,  hypothyroidism, chronic lymphedema, suspected OHS/LELIA on 3L NC home O2 p/w 1 episode of syncope with R arm jerking, likely 2/2 severe symptomatic AS. S/p AVR 4/24 c/b DUNCAN on CKD, fever and hypotension. Also UTI/pul edema/ sepsis and L adrenal nodule finding. Also orthostatic hypotension > now on Florinef. BG levels variable due to variable PO intake. Patient also refusing insulin on and off. Will continue with current regimen given better control of BG levels, patient eating well, and patient preference for no changes to insulin regimen. Most BG are noted to be at goal 100-180mg/dL, no hypoglycemia noted in the last 24hrs.     Per endocrine attending Dr Burton> Adrenal nodule work up completed and pt will need to f/u once she is more stable as out pt.     Home regimen: Lantus 33 units qhs, Novolog based on sliding scale TIDAC normally 3-5 units  Has Dexcom G7                 73F w/h/o of T1DM with unknown control due to CKD and anemia of chronic disease. DM c/b CKD. Also h/o HFrEF (EF 30%), mod AS, known LBBB, HTN,  hypothyroidism, chronic lymphedema, suspected OHS/LELIA on 3L NC home O2 p/w 1 episode of syncope with R arm jerking, likely 2/2 severe symptomatic AS. S/p AVR 4/24 c/b DUNCAN on CKD, fever and hypotension. Also UTI/pul edema/ sepsis and L adrenal nodule finding. Also orthostatic hypotension > now on Florinef. BG levels variable due to variable PO intake. Patient also refusing insulin on and off. Will continue with current regimen given better control of BG levels, patient eating well, and patient preference for no changes to insulin regimen. Most BG are noted to be at goal 100-180mg/dL, near hypoglycemia around lunch to 79mg/dL noted. Team will closely monitor BG levels.    Per endocrine attending Dr Burton> Adrenal nodule work up completed and pt will need to f/u once she is more stable as out pt.     Home regimen: Lantus 33 units qhs, Novolog based on sliding scale TIDAC normally 3-5 units  Has Dexcom G7

## 2024-06-04 NOTE — PROGRESS NOTE ADULT - PROBLEM SELECTOR PLAN 2
5/29 retaining urine requiring straight cath. On straight cath x2 urine was cloudy/milky in appearance. Hardy catheter replaced. UA with >998 WBC, LE, large blood, >36 epithelial cells concerning for UTI. She reports no other urinary sx.  UCx: E. Coli and VRE E. Faecalis   -continue macrobid (6/3 - 6/5)  -s/p CTX (5/29 - 5/31)  -hardy placed 5/29.

## 2024-06-04 NOTE — PROGRESS NOTE ADULT - SUBJECTIVE AND OBJECTIVE BOX
[facility]   [phone]  RENAL FOLLOW UP NOTE  --------------------------------------------------------------------------------  Chief Complaint:    24 hour events/subjective:    patient seen and examined.   persistent orthostasis     PAST HISTORY  --------------------------------------------------------------------------------  No significant changes to PMH, PSH, FHx, SHx, unless otherwise noted    ALLERGIES & MEDICATIONS  --------------------------------------------------------------------------------  Allergies    contrast media (iodine-based) (Fever; Vomiting; Flushing; Hypotension; Rash; Stomach Upset)  Ativan (Rash; Urticaria)  penicillins (Hives (Mod to Severe); Anaphylaxis (Mod to Severe); Short breath (Mod to Severe); Angioedema (Mod to Severe))    Intolerances      Standing Inpatient Medications  ammonium lactate 12% Lotion 1 Application(s) Topical <User Schedule>  apixaban 5 milliGRAM(s) Oral two times a day  aspirin  chewable 81 milliGRAM(s) Oral daily  atorvastatin 80 milliGRAM(s) Oral at bedtime  calamine/zinc oxide Lotion 1 Application(s) Topical three times a day  dextrose 10% Bolus 125 milliLiter(s) IV Bolus once  dextrose 5%. 1000 milliLiter(s) IV Continuous <Continuous>  dextrose 5%. 1000 milliLiter(s) IV Continuous <Continuous>  dextrose 50% Injectable 25 Gram(s) IV Push once  dextrose 50% Injectable 12.5 Gram(s) IV Push once  droxidopa 600 milliGRAM(s) Oral three times a day  ferrous    sulfate 325 milliGRAM(s) Oral two times a day  glucagon  Injectable 1 milliGRAM(s) IntraMuscular once  insulin glargine Injectable (LANTUS) 14 Unit(s) SubCutaneous at bedtime  insulin lispro (ADMELOG) corrective regimen sliding scale   SubCutaneous three times a day before meals  insulin lispro (ADMELOG) corrective regimen sliding scale   SubCutaneous <User Schedule>  insulin lispro Injectable (ADMELOG) 5 Unit(s) SubCutaneous three times a day before meals  lactated ringers. 1000 milliLiter(s) IV Continuous <Continuous>  levothyroxine 75 MICROGram(s) Oral daily  midodrine. 30 milliGRAM(s) Oral <User Schedule>  midodrine. 20 milliGRAM(s) Oral <User Schedule>  Nephro-molly 1 Tablet(s) Oral daily  nitrofurantoin monohydrate/macrocrystals (MACROBID) 100 milliGRAM(s) Oral two times a day  pantoprazole    Tablet 40 milliGRAM(s) Oral every 24 hours  psyllium Powder 1 Packet(s) Oral two times a day  triamcinolone 0.1% Ointment 1 Application(s) Topical two times a day    PRN Inpatient Medications  dextrose Oral Gel 15 Gram(s) Oral once PRN      REVIEW OF SYSTEMS  --------------------------------------------------------------------------------    Gen: denies fevers/chills,  CVS: denies chest pain/palpitations  Resp: denies SOB/Cough  GI: Denies N/V/Abd pain  : Denies dysuria/oliguria/hematuria    VITALS/PHYSICAL EXAM  --------------------------------------------------------------------------------  T(C): 36.7 (06-04-24 @ 04:58), Max: 36.7 (06-04-24 @ 04:58)  HR: 79 (06-04-24 @ 11:43) (68 - 79)  BP: 151/65 (06-04-24 @ 11:43) (137/63 - 152/83)  RR: 18 (06-04-24 @ 04:58) (18 - 18)  SpO2: 96% (06-04-24 @ 11:43) (93% - 100%)  Wt(kg): --        06-03-24 @ 07:01  -  06-04-24 @ 07:00  --------------------------------------------------------  IN: 670 mL / OUT: 1325 mL / NET: -655 mL    06-04-24 @ 07:01  -  06-04-24 @ 16:16  --------------------------------------------------------  IN: 320 mL / OUT: 850 mL / NET: -530 mL      Physical Exam:  	              Gen: comfortable appearing   	Pulm: decrease bs  no rales or ronchi or wheezing  	CV: No JVD. RRR, S1S2; no rub II/VI M   	Abd: +BS, soft, nontender/nondistended, obese   	UE: Warm, no cyanosis  no clubbing, no edema  	LE: Warm, no cyanosis  no clubbing, 2- edema, B/L ACE bandage  	Neuro: alert and oriented     LABS/STUDIES  --------------------------------------------------------------------------------              10.5   7.76  >-----------<  151      [06-03-24 @ 06:41]              33.5     137  |  104  |  32  ----------------------------<  166      [06-03-24 @ 06:40]  4.5   |  18  |  2.06        Ca     8.1     [06-03-24 @ 06:40]      Mg     2.0     [06-03-24 @ 06:40]      Phos  4.0     [06-03-24 @ 06:40]            Creatinine Trend:  SCr 2.06 [06-03 @ 06:40]  SCr 1.77 [06-02 @ 07:05]  SCr 1.95 [06-01 @ 12:06]  SCr 2.50 [05-30 @ 06:51]  SCr 2.39 [05-29 @ 07:02]            PTH -- (Ca 8.4)      [04-19-24 @ 17:54]   109  TSH 10.20      [05-28-24 @ 06:42]  Lipid: chol 74, TG 70, HDL 33, LDL --      [04-13-24 @ 07:05]

## 2024-06-04 NOTE — PROGRESS NOTE ADULT - SUBJECTIVE AND OBJECTIVE BOX
infectious diseases progress note:    Patient is a 73y old  Female who presents with a chief complaint of Syncope (04 Jun 2024 07:41)        Syncope and collapse             Allergies    contrast media (iodine-based) (Fever; Vomiting; Flushing; Hypotension; Rash; Stomach Upset)  Ativan (Rash; Urticaria)  penicillins (Hives (Mod to Severe); Anaphylaxis (Mod to Severe); Short breath (Mod to Severe); Angioedema (Mod to Severe))    Intolerances        ANTIBIOTICS/RELEVANT:  antimicrobials  nitrofurantoin monohydrate/macrocrystals (MACROBID) 100 milliGRAM(s) Oral two times a day    immunologic:    OTHER:  ammonium lactate 12% Lotion 1 Application(s) Topical <User Schedule>  apixaban 5 milliGRAM(s) Oral two times a day  aspirin  chewable 81 milliGRAM(s) Oral daily  atorvastatin 80 milliGRAM(s) Oral at bedtime  calamine/zinc oxide Lotion 1 Application(s) Topical three times a day  dextrose 10% Bolus 125 milliLiter(s) IV Bolus once  dextrose 5%. 1000 milliLiter(s) IV Continuous <Continuous>  dextrose 5%. 1000 milliLiter(s) IV Continuous <Continuous>  dextrose 50% Injectable 25 Gram(s) IV Push once  dextrose 50% Injectable 12.5 Gram(s) IV Push once  dextrose Oral Gel 15 Gram(s) Oral once PRN  droxidopa 600 milliGRAM(s) Oral three times a day  ferrous    sulfate 325 milliGRAM(s) Oral two times a day  fludroCORTISONE 0.2 milliGRAM(s) Oral every 12 hours  glucagon  Injectable 1 milliGRAM(s) IntraMuscular once  insulin glargine Injectable (LANTUS) 14 Unit(s) SubCutaneous at bedtime  insulin lispro (ADMELOG) corrective regimen sliding scale   SubCutaneous three times a day before meals  insulin lispro (ADMELOG) corrective regimen sliding scale   SubCutaneous <User Schedule>  insulin lispro Injectable (ADMELOG) 5 Unit(s) SubCutaneous three times a day before meals  levothyroxine 75 MICROGram(s) Oral daily  midodrine. 20 milliGRAM(s) Oral three times a day  Nephro-molly 1 Tablet(s) Oral daily  pantoprazole    Tablet 40 milliGRAM(s) Oral every 24 hours  psyllium Powder 1 Packet(s) Oral daily  triamcinolone 0.1% Ointment 1 Application(s) Topical two times a day      Objective:  Vital Signs Last 24 Hrs  T(C): 36.7 (04 Jun 2024 04:58), Max: 37 (03 Jun 2024 11:07)  T(F): 98.1 (04 Jun 2024 04:58), Max: 98.6 (03 Jun 2024 11:07)  HR: 75 (04 Jun 2024 04:58) (68 - 75)  BP: 137/63 (04 Jun 2024 04:58) (137/63 - 152/83)  BP(mean): --  RR: 18 (04 Jun 2024 04:58) (18 - 18)  SpO2: 93% (04 Jun 2024 04:58) (93% - 100%)    Parameters below as of 04 Jun 2024 04:58  Patient On (Oxygen Delivery Method): room air        PHYSICAL EXAM:  Constitutional:Well-developed, well nourished--no acute distress  Eyes:YOKO, EOMI  Ear/Nose/Throat: no oral lesion, no sinus tenderness on percussion	  Neck:no JVD, no lymphadenopathy, supple  Respiratory: CTA rome  Cardiovascular: S1S2 RRR, no murmurs  Gastrointestinal:soft, (+) BS, no HSM  Extremities:no e/e/c        LABS:                        10.5   7.76  )-----------( 151      ( 03 Jun 2024 06:41 )             33.5     06-03    137  |  104  |  32<H>  ----------------------------<  166<H>  4.5   |  18<L>  |  2.06<H>    Ca    8.1<L>      03 Jun 2024 06:40  Phos  4.0     06-03  Mg     2.0     06-03        Urinalysis Basic - ( 03 Jun 2024 06:40 )    Color: x / Appearance: x / SG: x / pH: x  Gluc: 166 mg/dL / Ketone: x  / Bili: x / Urobili: x   Blood: x / Protein: x / Nitrite: x   Leuk Esterase: x / RBC: x / WBC x   Sq Epi: x / Non Sq Epi: x / Bacteria: x          MICROBIOLOGY:    RECENT CULTURES:  05-29 @ 13:17 Catheterized Catheterized   CATRACHITA      Escherichia coli  Enterococcus faecalis (vancomycin resistant)  Escherichia coli     >100,000 CFU/ml Escherichia coli  >100,000 CFU/ml Enterococcus faecalis (vancomycin resistant)          RESPIRATORY CULTURES:              RADIOLOGY & ADDITIONAL STUDIES:        Pager [phone]  After 5 pm/weekends or if no response :[phone]

## 2024-06-04 NOTE — PROGRESS NOTE ADULT - SUBJECTIVE AND OBJECTIVE BOX
[facility] - Howard Kimble, Carlos Luong, Herman Alston  Office Number:  [phone]    Patient resting comfortably in bed in NAD.  Laying flat with no respiratory distress.  No complaints of chest pain, dyspnea, palpitations, PND, or orthopnea.  Still orthostatic  On abx for UTI    ROS: negative unless otherwise mentioned.    Telemetry:      MEDICATIONS  (STANDING):  ammonium lactate 12% Lotion 1 Application(s) Topical <User Schedule>  apixaban 5 milliGRAM(s) Oral two times a day  aspirin  chewable 81 milliGRAM(s) Oral daily  atorvastatin 80 milliGRAM(s) Oral at bedtime  calamine/zinc oxide Lotion 1 Application(s) Topical three times a day  dextrose 10% Bolus 125 milliLiter(s) IV Bolus once  dextrose 5%. 1000 milliLiter(s) (100 mL/Hr) IV Continuous <Continuous>  dextrose 5%. 1000 milliLiter(s) (50 mL/Hr) IV Continuous <Continuous>  dextrose 50% Injectable 25 Gram(s) IV Push once  dextrose 50% Injectable 12.5 Gram(s) IV Push once  droxidopa 600 milliGRAM(s) Oral three times a day  ferrous    sulfate 325 milliGRAM(s) Oral two times a day  fludroCORTISONE 0.2 milliGRAM(s) Oral every 12 hours  glucagon  Injectable 1 milliGRAM(s) IntraMuscular once  insulin glargine Injectable (LANTUS) 14 Unit(s) SubCutaneous at bedtime  insulin lispro (ADMELOG) corrective regimen sliding scale   SubCutaneous three times a day before meals  insulin lispro (ADMELOG) corrective regimen sliding scale   SubCutaneous <User Schedule>  insulin lispro Injectable (ADMELOG) 5 Unit(s) SubCutaneous three times a day before meals  levothyroxine 75 MICROGram(s) Oral daily  midodrine. 20 milliGRAM(s) Oral three times a day  Nephro-molly 1 Tablet(s) Oral daily  nitrofurantoin monohydrate/macrocrystals (MACROBID) 100 milliGRAM(s) Oral two times a day  pantoprazole    Tablet 40 milliGRAM(s) Oral every 24 hours  psyllium Powder 1 Packet(s) Oral daily  triamcinolone 0.1% Ointment 1 Application(s) Topical two times a day    MEDICATIONS  (PRN):  dextrose Oral Gel 15 Gram(s) Oral once PRN Blood Glucose LESS THAN 70 milliGRAM(s)/deciliter      Allergies    contrast media (iodine-based) (Fever; Vomiting; Flushing; Hypotension; Rash; Stomach Upset)  Ativan (Rash; Urticaria)  penicillins (Hives (Mod to Severe); Anaphylaxis (Mod to Severe); Short breath (Mod to Severe); Angioedema (Mod to Severe))    Intolerances        Vital Signs Last 24 Hrs  T(C): 36.7 (04 Jun 2024 04:58), Max: 37 (03 Jun 2024 11:07)  T(F): 98.1 (04 Jun 2024 04:58), Max: 98.6 (03 Jun 2024 11:07)  HR: 75 (04 Jun 2024 04:58) (68 - 75)  BP: 137/63 (04 Jun 2024 04:58) (137/63 - 152/83)  BP(mean): --  RR: 18 (04 Jun 2024 04:58) (18 - 18)  SpO2: 93% (04 Jun 2024 04:58) (93% - 100%)    Parameters below as of 04 Jun 2024 04:58  Patient On (Oxygen Delivery Method): room air        I&O's Summary    03 Jun 2024 07:01  -  04 Jun 2024 07:00  --------------------------------------------------------  IN: 670 mL / OUT: 1325 mL / NET: -655 mL        ON EXAM:    General: NAD, awake and alert, oriented x 3  HEENT: Mucous membranes are moist, anicteric  Lungs: Non-labored, breath sounds are clear bilaterally, No wheezing, rales or rhonchi  Cardiovascular: Regular, S1 and S2, 2/6 harsh systolic murmur  Gastrointestinal: Bowel Sounds present, soft, nontender.   Lymph: chronic lymphedema No lymphadenopathy.  Skin: No rashes or ulcers  Psych:  Mood & affect appropriate    LABS: All Labs Reviewed:                        10.5   7.76  )-----------( 151      ( 03 Jun 2024 06:41 )             33.5                         10.4   8.11  )-----------( 166      ( 02 Jun 2024 07:05 )             34.2                         10.4   8.42  )-----------( 186      ( 01 Jun 2024 12:06 )             33.5     03 Jun 2024 06:40    137    |  104    |  32     ----------------------------<  166    4.5     |  18     |  2.06   02 Jun 2024 07:05    138    |  104    |  32     ----------------------------<  200    4.5     |  18     |  1.77   01 Jun 2024 12:06    133    |  101    |  34     ----------------------------<  247    3.9     |  20     |  1.95     Ca    8.1        03 Jun 2024 06:40  Ca    8.5        02 Jun 2024 07:05  Ca    8.4        01 Jun 2024 12:06  Phos  4.0       03 Jun 2024 06:40  Phos  3.7       02 Jun 2024 07:05  Phos  3.6       01 Jun 2024 12:06  Mg     2.0       03 Jun 2024 06:40  Mg     2.0       02 Jun 2024 07:05  Mg     1.8       01 Jun 2024 12:06            Blood Culture:        [facility] - Howard Kimlbe, Carlos Luong, Herman Alston  Office Number:  [phone]    Patient resting comfortably in bed in NAD.  Laying flat with no respiratory distress.  No complaints of chest pain, dyspnea, palpitations, PND, or orthopnea.  Still orthostatic and symptomatic   On abx for UTI    ROS: negative unless otherwise mentioned.    Telemetry: SR first degree    MEDICATIONS  (STANDING):  ammonium lactate 12% Lotion 1 Application(s) Topical <User Schedule>  apixaban 5 milliGRAM(s) Oral two times a day  aspirin  chewable 81 milliGRAM(s) Oral daily  atorvastatin 80 milliGRAM(s) Oral at bedtime  calamine/zinc oxide Lotion 1 Application(s) Topical three times a day  dextrose 10% Bolus 125 milliLiter(s) IV Bolus once  dextrose 5%. 1000 milliLiter(s) (100 mL/Hr) IV Continuous <Continuous>  dextrose 5%. 1000 milliLiter(s) (50 mL/Hr) IV Continuous <Continuous>  dextrose 50% Injectable 25 Gram(s) IV Push once  dextrose 50% Injectable 12.5 Gram(s) IV Push once  droxidopa 600 milliGRAM(s) Oral three times a day  ferrous    sulfate 325 milliGRAM(s) Oral two times a day  fludroCORTISONE 0.2 milliGRAM(s) Oral every 12 hours  glucagon  Injectable 1 milliGRAM(s) IntraMuscular once  insulin glargine Injectable (LANTUS) 14 Unit(s) SubCutaneous at bedtime  insulin lispro (ADMELOG) corrective regimen sliding scale   SubCutaneous three times a day before meals  insulin lispro (ADMELOG) corrective regimen sliding scale   SubCutaneous <User Schedule>  insulin lispro Injectable (ADMELOG) 5 Unit(s) SubCutaneous three times a day before meals  levothyroxine 75 MICROGram(s) Oral daily  midodrine. 20 milliGRAM(s) Oral three times a day  Nephro-molly 1 Tablet(s) Oral daily  nitrofurantoin monohydrate/macrocrystals (MACROBID) 100 milliGRAM(s) Oral two times a day  pantoprazole    Tablet 40 milliGRAM(s) Oral every 24 hours  psyllium Powder 1 Packet(s) Oral daily  triamcinolone 0.1% Ointment 1 Application(s) Topical two times a day    MEDICATIONS  (PRN):  dextrose Oral Gel 15 Gram(s) Oral once PRN Blood Glucose LESS THAN 70 milliGRAM(s)/deciliter      Allergies    contrast media (iodine-based) (Fever; Vomiting; Flushing; Hypotension; Rash; Stomach Upset)  Ativan (Rash; Urticaria)  penicillins (Hives (Mod to Severe); Anaphylaxis (Mod to Severe); Short breath (Mod to Severe); Angioedema (Mod to Severe))    Intolerances        Vital Signs Last 24 Hrs  T(C): 36.7 (04 Jun 2024 04:58), Max: 37 (03 Jun 2024 11:07)  T(F): 98.1 (04 Jun 2024 04:58), Max: 98.6 (03 Jun 2024 11:07)  HR: 75 (04 Jun 2024 04:58) (68 - 75)  BP: 137/63 (04 Jun 2024 04:58) (137/63 - 152/83)  BP(mean): --  RR: 18 (04 Jun 2024 04:58) (18 - 18)  SpO2: 93% (04 Jun 2024 04:58) (93% - 100%)    Parameters below as of 04 Jun 2024 04:58  Patient On (Oxygen Delivery Method): room air        I&O's Summary    03 Jun 2024 07:01  -  04 Jun 2024 07:00  --------------------------------------------------------  IN: 670 mL / OUT: 1325 mL / NET: -655 mL        ON EXAM:    General: NAD, awake and alert, oriented x 3  HEENT: Mucous membranes are moist, anicteric  Lungs: Non-labored, breath sounds are clear bilaterally, No wheezing, rales or rhonchi  Cardiovascular: Regular, S1 and S2, 2/6 harsh systolic murmur  Gastrointestinal: Bowel Sounds present, soft, nontender.   Lymph: chronic lymphedema No lymphadenopathy.  Skin: No rashes or ulcers  Psych:  Mood & affect appropriate    LABS: All Labs Reviewed:                        10.5   7.76  )-----------( 151      ( 03 Jun 2024 06:41 )             33.5                         10.4   8.11  )-----------( 166      ( 02 Jun 2024 07:05 )             34.2                         10.4   8.42  )-----------( 186      ( 01 Jun 2024 12:06 )             33.5     03 Jun 2024 06:40    137    |  104    |  32     ----------------------------<  166    4.5     |  18     |  2.06   02 Jun 2024 07:05    138    |  104    |  32     ----------------------------<  200    4.5     |  18     |  1.77   01 Jun 2024 12:06    133    |  101    |  34     ----------------------------<  247    3.9     |  20     |  1.95     Ca    8.1        03 Jun 2024 06:40  Ca    8.5        02 Jun 2024 07:05  Ca    8.4        01 Jun 2024 12:06  Phos  4.0       03 Jun 2024 06:40  Phos  3.7       02 Jun 2024 07:05  Phos  3.6       01 Jun 2024 12:06  Mg     2.0       03 Jun 2024 06:40  Mg     2.0       02 Jun 2024 07:05  Mg     1.8       01 Jun 2024 12:06            Blood Culture:

## 2024-06-04 NOTE — PROGRESS NOTE ADULT - ATTENDING COMMENTS
73F PMH: HFrEF (EF 30%), AS, LBBB, HTN, DM1, CKD, hypothyroidism, chronic lymphedema, LELIA (3L home O2) p/w for syncope 2/2 severe AS, s/p TAVR 4/24. Course complicated by contrast induced allergic reaction (had CTA for TAVR eval) and contrast related renal failure (acute on chronic) requiring HD. TAVR c/b VT and vfib requiring shock and flash pulmonary edema causing Acute hypoxic respiratory failure  requiring intubation.  Course complicated by contrast induced allergic reaction (had CTA for TAVR eval) and contrast related renal failure (acute on chronic) requiring CRRT for fluid removal and pressors for vasoplegia and hypovolemia due to CRRT. Patient extubated, now off pressors, CRRT.     1. Orthostatic hypotension- droxydopa, midodrine with florinef. Pt seen w PT today. Abdominal binder and compression stockings in place. Isometric exercises done before sitting up. BP still dropped to 60s systolic, dizzy.     Started on IV fluids, am and afternoon Midodrine increased. To reassess tomorrow.       2. Urinary retention- failed 3 voiding trials.   3. Left UE DVT- c/w eliquis. Repeat US showing persistent DVT noted in the left subclavian and axillary veins. ACE wrap for swelling.   4. T1DM- uncontrolled with intermittent episodes of labile BS- . Being managed by endocrinology. Currently on Lantus and Admelog.   5. DUNCAN on CKD stage 4- cr stable.  6. HFrEF- currently GDMT is on hold secondary to orthostatic hypotension.

## 2024-06-04 NOTE — PROGRESS NOTE ADULT - ASSESSMENT
73 year old with multiple ,medical problems hospitalized for several weeks due to CHF GIB, STELLA, DM syncope    pt has no symptoms of UTI. No fevr or chills but did have to have a De [place] Garza placed  urine cultures have grown ecoli and  enterococcus    although uncommon, UTI can cause urinary retention    would treat for three days    day 2/3 nitrofurantoin

## 2024-06-04 NOTE — PROGRESS NOTE ADULT - SUBJECTIVE AND OBJECTIVE BOX
Marcial Erickson MD  PGY 1 Department of Internal Medicine        Patient is a 73y old  Female who presents with a chief complaint of Syncope (03 Jun 2024 14:42)      SUBJECTIVE / OVERNIGHT EVENTS: Pt seen and examined. No acute overnight events. Denies fevers, chills, CP, SOB, Abdominal pain, N/V, Constipation, Diarrhea        MEDICATIONS  (STANDING):  ammonium lactate 12% Lotion 1 Application(s) Topical <User Schedule>  apixaban 5 milliGRAM(s) Oral two times a day  aspirin  chewable 81 milliGRAM(s) Oral daily  atorvastatin 80 milliGRAM(s) Oral at bedtime  calamine/zinc oxide Lotion 1 Application(s) Topical three times a day  dextrose 10% Bolus 125 milliLiter(s) IV Bolus once  dextrose 5%. 1000 milliLiter(s) (100 mL/Hr) IV Continuous <Continuous>  dextrose 5%. 1000 milliLiter(s) (50 mL/Hr) IV Continuous <Continuous>  dextrose 50% Injectable 25 Gram(s) IV Push once  dextrose 50% Injectable 12.5 Gram(s) IV Push once  droxidopa 600 milliGRAM(s) Oral three times a day  ferrous    sulfate 325 milliGRAM(s) Oral two times a day  fludroCORTISONE 0.2 milliGRAM(s) Oral every 12 hours  glucagon  Injectable 1 milliGRAM(s) IntraMuscular once  insulin glargine Injectable (LANTUS) 14 Unit(s) SubCutaneous at bedtime  insulin lispro (ADMELOG) corrective regimen sliding scale   SubCutaneous three times a day before meals  insulin lispro (ADMELOG) corrective regimen sliding scale   SubCutaneous <User Schedule>  insulin lispro Injectable (ADMELOG) 5 Unit(s) SubCutaneous three times a day before meals  levothyroxine 75 MICROGram(s) Oral daily  midodrine. 20 milliGRAM(s) Oral three times a day  Nephro-molly 1 Tablet(s) Oral daily  nitrofurantoin monohydrate/macrocrystals (MACROBID) 100 milliGRAM(s) Oral two times a day  pantoprazole    Tablet 40 milliGRAM(s) Oral every 24 hours  psyllium Powder 1 Packet(s) Oral daily  triamcinolone 0.1% Ointment 1 Application(s) Topical two times a day    MEDICATIONS  (PRN):  dextrose Oral Gel 15 Gram(s) Oral once PRN Blood Glucose LESS THAN 70 milliGRAM(s)/deciliter      I&O's Summary    03 Jun 2024 07:01  -  04 Jun 2024 07:00  --------------------------------------------------------  IN: 670 mL / OUT: 1325 mL / NET: -655 mL        Vital Signs Last 24 Hrs  T(C): 36.7 (04 Jun 2024 04:58), Max: 37 (03 Jun 2024 11:07)  T(F): 98.1 (04 Jun 2024 04:58), Max: 98.6 (03 Jun 2024 11:07)  HR: 75 (04 Jun 2024 04:58) (68 - 75)  BP: 137/63 (04 Jun 2024 04:58) (137/63 - 152/83)  BP(mean): --  RR: 18 (04 Jun 2024 04:58) (18 - 18)  SpO2: 93% (04 Jun 2024 04:58) (93% - 100%)    Parameters below as of 04 Jun 2024 04:58  Patient On (Oxygen Delivery Method): room air        CAPILLARY BLOOD GLUCOSE      POCT Blood Glucose.: 211 mg/dL (04 Jun 2024 02:25)  POCT Blood Glucose.: 151 mg/dL (03 Jun 2024 21:41)  POCT Blood Glucose.: 199 mg/dL (03 Jun 2024 17:11)  POCT Blood Glucose.: 172 mg/dL (03 Jun 2024 12:28)  POCT Blood Glucose.: 217 mg/dL (03 Jun 2024 08:27)      PHYSICAL EXAM:  GENERAL: NAD,   HEAD:  Atraumatic, Normocephalic  EYES: EOMI, conjunctiva and sclera clear  ENMT: Moist mucous membranes  CHEST/LUNG: Clear to auscultation bilaterally; No rales, rhonchi, wheezing, or rubs  HEART: Regular rate and rhythm, systolic murmur  ABDOMEN: Soft, Nontender, Nondistended; Bowel sounds present  EXTREMITIES:  2+ Peripheral Pulses, 1-2+ b/l edema up to thighs in ace wrap. LUE in dressing with edema and erythema but no warmth or tenderness. Full ROM with strength/sensation/pulses intact  SKIN: erythematous, macular rash to anterior chest wall, neck, and upper extremities improving  NERVOUS SYSTEM:  Alert, no focal deficits  PSYCH:  Appropriate affect       LABS:                        10.5   7.76  )-----------( 151      ( 03 Jun 2024 06:41 )             33.5     Auto Eosinophil # x     / Auto Eosinophil % x     / Auto Neutrophil # x     / Auto Neutrophil % x     / BANDS % x        06-03    137  |  104  |  32<H>  ----------------------------<  166<H>  4.5   |  18<L>  |  2.06<H>    Ca    8.1<L>      03 Jun 2024 06:40  Mg     2.0     06-03  Phos  4.0     06-03          Urinalysis Basic - ( 03 Jun 2024 06:40 )    Color: x / Appearance: x / SG: x / pH: x  Gluc: 166 mg/dL / Ketone: x  / Bili: x / Urobili: x   Blood: x / Protein: x / Nitrite: x   Leuk Esterase: x / RBC: x / WBC x   Sq Epi: x / Non Sq Epi: x / Bacteria: x            RADIOLOGY & ADDITIONAL TESTS:    Imaging Personally Reviewed:    Consultant(s) Notes Reviewed:      Care Discussed with Consultants/Other Providers:   Marcial Erickson MD  PGY 1 Department of Internal Medicine        Patient is a 73y old  Female who presents with a chief complaint of Syncope (03 Jun 2024 14:42)      SUBJECTIVE / OVERNIGHT EVENTS: Pt seen and examined. No acute overnight events. No acute complaints at this time.         MEDICATIONS  (STANDING):  ammonium lactate 12% Lotion 1 Application(s) Topical <User Schedule>  apixaban 5 milliGRAM(s) Oral two times a day  aspirin  chewable 81 milliGRAM(s) Oral daily  atorvastatin 80 milliGRAM(s) Oral at bedtime  calamine/zinc oxide Lotion 1 Application(s) Topical three times a day  dextrose 10% Bolus 125 milliLiter(s) IV Bolus once  dextrose 5%. 1000 milliLiter(s) (100 mL/Hr) IV Continuous <Continuous>  dextrose 5%. 1000 milliLiter(s) (50 mL/Hr) IV Continuous <Continuous>  dextrose 50% Injectable 25 Gram(s) IV Push once  dextrose 50% Injectable 12.5 Gram(s) IV Push once  droxidopa 600 milliGRAM(s) Oral three times a day  ferrous    sulfate 325 milliGRAM(s) Oral two times a day  fludroCORTISONE 0.2 milliGRAM(s) Oral every 12 hours  glucagon  Injectable 1 milliGRAM(s) IntraMuscular once  insulin glargine Injectable (LANTUS) 14 Unit(s) SubCutaneous at bedtime  insulin lispro (ADMELOG) corrective regimen sliding scale   SubCutaneous three times a day before meals  insulin lispro (ADMELOG) corrective regimen sliding scale   SubCutaneous <User Schedule>  insulin lispro Injectable (ADMELOG) 5 Unit(s) SubCutaneous three times a day before meals  levothyroxine 75 MICROGram(s) Oral daily  midodrine. 20 milliGRAM(s) Oral three times a day  Nephro-molly 1 Tablet(s) Oral daily  nitrofurantoin monohydrate/macrocrystals (MACROBID) 100 milliGRAM(s) Oral two times a day  pantoprazole    Tablet 40 milliGRAM(s) Oral every 24 hours  psyllium Powder 1 Packet(s) Oral daily  triamcinolone 0.1% Ointment 1 Application(s) Topical two times a day    MEDICATIONS  (PRN):  dextrose Oral Gel 15 Gram(s) Oral once PRN Blood Glucose LESS THAN 70 milliGRAM(s)/deciliter      I&O's Summary    03 Jun 2024 07:01  -  04 Jun 2024 07:00  --------------------------------------------------------  IN: 670 mL / OUT: 1325 mL / NET: -655 mL        Vital Signs Last 24 Hrs  T(C): 36.7 (04 Jun 2024 04:58), Max: 37 (03 Jun 2024 11:07)  T(F): 98.1 (04 Jun 2024 04:58), Max: 98.6 (03 Jun 2024 11:07)  HR: 75 (04 Jun 2024 04:58) (68 - 75)  BP: 137/63 (04 Jun 2024 04:58) (137/63 - 152/83)  BP(mean): --  RR: 18 (04 Jun 2024 04:58) (18 - 18)  SpO2: 93% (04 Jun 2024 04:58) (93% - 100%)    Parameters below as of 04 Jun 2024 04:58  Patient On (Oxygen Delivery Method): room air        CAPILLARY BLOOD GLUCOSE      POCT Blood Glucose.: 211 mg/dL (04 Jun 2024 02:25)  POCT Blood Glucose.: 151 mg/dL (03 Jun 2024 21:41)  POCT Blood Glucose.: 199 mg/dL (03 Jun 2024 17:11)  POCT Blood Glucose.: 172 mg/dL (03 Jun 2024 12:28)  POCT Blood Glucose.: 217 mg/dL (03 Jun 2024 08:27)      PHYSICAL EXAM:  GENERAL: NAD,   HEAD:  Atraumatic, Normocephalic  EYES: EOMI, conjunctiva and sclera clear  ENMT: Moist mucous membranes  CHEST/LUNG: Clear to auscultation bilaterally; No rales, rhonchi, wheezing, or rubs  HEART: Regular rate and rhythm, systolic murmur  ABDOMEN: Soft, Nontender, Nondistended; Bowel sounds present  EXTREMITIES:  2+ Peripheral Pulses, 1-2+ b/l edema up to thighs in ace wrap. LUE in dressing with edema and erythema but no warmth or tenderness. Full ROM with strength/sensation/pulses intact  SKIN: erythematous, macular rash to anterior chest wall, neck, and upper extremities improving  NERVOUS SYSTEM:  Alert, no focal deficits  PSYCH:  Appropriate affect       LABS:                        10.5   7.76  )-----------( 151      ( 03 Jun 2024 06:41 )             33.5     Auto Eosinophil # x     / Auto Eosinophil % x     / Auto Neutrophil # x     / Auto Neutrophil % x     / BANDS % x        06-03    137  |  104  |  32<H>  ----------------------------<  166<H>  4.5   |  18<L>  |  2.06<H>    Ca    8.1<L>      03 Jun 2024 06:40  Mg     2.0     06-03  Phos  4.0     06-03          Urinalysis Basic - ( 03 Jun 2024 06:40 )    Color: x / Appearance: x / SG: x / pH: x  Gluc: 166 mg/dL / Ketone: x  / Bili: x / Urobili: x   Blood: x / Protein: x / Nitrite: x   Leuk Esterase: x / RBC: x / WBC x   Sq Epi: x / Non Sq Epi: x / Bacteria: x            RADIOLOGY & ADDITIONAL TESTS:    Imaging Personally Reviewed:    Consultant(s) Notes Reviewed:      Care Discussed with Consultants/Other Providers:

## 2024-06-04 NOTE — PROGRESS NOTE ADULT - PROBLEM SELECTOR PLAN 1
Likely in setting of deconditioning.  Improving with fluids.  Will attempt orthostatics daily.  Hoping to wean off midodrine in order to restart GDMT.  Orthostatics improving from supine to seated, but still problematic with standing.  Remains orthostatic and symptomatic on standing  - leg compression, difficulty with abdominal binder due to body habitus   - OOB as tolerated   - midodrine 20mg TID.  - continue droxydopa 600mg TID  - Continue fludrocortisone 0.2mg daily  - Continue to evaluate standing orthostatics daily Likely in setting of deconditioning.  Improving with fluids.  Will attempt orthostatics daily.  Hoping to wean off midodrine in order to restart GDMT.  Orthostatics improving from supine to seated, but still problematic with standing.  Remains orthostatic and symptomatic on standing  - leg compression, will attempt abdominal binder however difficult due to body habitus   - OOB as tolerated   - midodrine 20mg TID.  - continue droxydopa 600mg TID  - Continue fludrocortisone 0.2mg daily  - Continue to evaluate standing orthostatics daily Likely in setting of deconditioning.  Improving with fluids.  Will attempt orthostatics daily.  Hoping to wean off midodrine in order to restart GDMT.  Orthostatics improving from supine to seated, but still problematic with standing.  Remains orthostatic and symptomatic on standing  - leg compression, will attempt abdominal binder however difficult due to body habitus   - OOB as tolerated   - midodrine 30mg TID.  - continue droxydopa 600mg TID  - Continue fludrocortisone 0.2mg daily  - Continue to evaluate standing orthostatics daily

## 2024-06-04 NOTE — PROGRESS NOTE ADULT - SUBJECTIVE AND OBJECTIVE BOX
Patient seen today for follow up inpatient Diabetes Mellitus management.    Chief Complaint: Type 2 Diabetes Mellitus     INTERVAL HX:  Patient seen in [facility] 3DSU 343 W1. Patient is alert and oriented, resting in bed. Family member at bedside. Patient feels she is doing well today. BG have been stable and mostly at goal 100-180mg/dL while on a Consistent Carbohydrate Diet. Patient denies eating snacks at night/bedtime, states she never does. Patient has been tolerating diet well. Blood glucose levels in the last 24hrs have been 151-211mg/dL.     Review of Systems:  General: As above.  Respiratory: Denies any SOB, CURTIS, or cough.  Gastrointestinal: Denies any n/v/d or abdominal pain.   Endocrine: Denies any polyuria, polydypsia, polyphagia, visual changes, or numbness in feet.     Allergies  contrast media (iodine-based) (Fever; Vomiting; Flushing; Hypotension; Rash; Stomach Upset)  Ativan (Rash; Urticaria)  penicillins (Hives (Mod to Severe); Anaphylaxis (Mod to Severe); Short breath (Mod to Severe); Angioedema (Mod to Severe))    Intolerances  None.     MEDICATIONS  (STANDING):  ammonium lactate 12% Lotion 1 Application(s) Topical <User Schedule>  apixaban 5 milliGRAM(s) Oral two times a day  aspirin  chewable 81 milliGRAM(s) Oral daily  atorvastatin 80 milliGRAM(s) Oral at bedtime  calamine/zinc oxide Lotion 1 Application(s) Topical three times a day  dextrose 10% Bolus 125 milliLiter(s) IV Bolus once  dextrose 5%. 1000 milliLiter(s) IV Continuous <Continuous>  dextrose 5%. 1000 milliLiter(s) IV Continuous <Continuous>  dextrose 50% Injectable 25 Gram(s) IV Push once  dextrose 50% Injectable 12.5 Gram(s) IV Push once  dextrose Oral Gel 15 Gram(s) Oral once PRN  droxidopa 600 milliGRAM(s) Oral three times a day  ferrous    sulfate 325 milliGRAM(s) Oral two times a day  fludroCORTISONE 0.2 milliGRAM(s) Oral every 12 hours  glucagon  Injectable 1 milliGRAM(s) IntraMuscular once  insulin glargine Injectable (LANTUS) 14 Unit(s) SubCutaneous at bedtime  insulin lispro (ADMELOG) corrective regimen sliding scale   SubCutaneous three times a day before meals  insulin lispro (ADMELOG) corrective regimen sliding scale   SubCutaneous <User Schedule>  insulin lispro Injectable (ADMELOG) 5 Unit(s) SubCutaneous three times a day before meals  levothyroxine 75 MICROGram(s) Oral daily  midodrine. 20 milliGRAM(s) Oral three times a day  Nephro-molly 1 Tablet(s) Oral daily  nitrofurantoin monohydrate/macrocrystals (MACROBID) 100 milliGRAM(s) Oral two times a day  pantoprazole    Tablet 40 milliGRAM(s) Oral every 24 hours  psyllium Powder 1 Packet(s) Oral two times a day  triamcinolone 0.1% Ointment 1 Application(s) Topical two times a day      atorvastatin 80 milliGRAM(s) Oral at bedtime  dextrose 50% Injectable 25 Gram(s) IV Push once  dextrose 50% Injectable 12.5 Gram(s) IV Push once  dextrose Oral Gel 15 Gram(s) Oral once PRN  fludroCORTISONE 0.2 milliGRAM(s) Oral every 12 hours  glucagon  Injectable 1 milliGRAM(s) IntraMuscular once  insulin glargine Injectable (LANTUS) 14 Unit(s) SubCutaneous at bedtime  insulin lispro (ADMELOG) corrective regimen sliding scale   SubCutaneous three times a day before meals  insulin lispro (ADMELOG) corrective regimen sliding scale   SubCutaneous <User Schedule>  insulin lispro Injectable (ADMELOG) 5 Unit(s) SubCutaneous three times a day before meals  levothyroxine 75 MICROGram(s) Oral daily      insulin lispro (ADMELOG) corrective regimen sliding scale   SubCutaneous three times a day before meals  insulin lispro (ADMELOG) corrective regimen sliding scale   SubCutaneous <User Schedule>  insulin lispro Injectable (ADMELOG) 5 Unit(s) SubCutaneous three times a day before meals      PHYSICAL EXAM:  VITALS:   T(C): 36.7 (06-04-24 @ 04:58), Max: 36.7 (06-04-24 @ 04:58)  HR: 75 (06-04-24 @ 04:58) (68 - 75)  BP: 137/63 (06-04-24 @ 04:58) (137/63 - 152/83)  RR: 18 (06-04-24 @ 04:58) (18 - 18)  SpO2: 93% (06-04-24 @ 04:58) (93% - 100%)    GENERAL: In no acute distress  Respiratory: Respirations unlabored  Extremities: Warm and dry, BLE edema and legs wrapped with ace bandage, c/d/i  Neuro: Alert and orineted, appropriate     LABS:  POCT Blood Glucose.: 171 mg/dL (06-04-24 @ 08:48)  POCT Blood Glucose.: 211 mg/dL (06-04-24 @ 02:25)  POCT Blood Glucose.: 151 mg/dL (06-03-24 @ 21:41)  POCT Blood Glucose.: 199 mg/dL (06-03-24 @ 17:11)  POCT Blood Glucose.: 172 mg/dL (06-03-24 @ 12:28)  POCT Blood Glucose.: 217 mg/dL (06-03-24 @ 08:27)  POCT Blood Glucose.: 179 mg/dL (06-03-24 @ 02:06)  POCT Blood Glucose.: 139 mg/dL (06-02-24 @ 22:26)  POCT Blood Glucose.: 76 mg/dL (06-02-24 @ 21:25)  POCT Blood Glucose.: 159 mg/dL (06-02-24 @ 17:02)  POCT Blood Glucose.: 156 mg/dL (06-02-24 @ 12:48)  POCT Blood Glucose.: 216 mg/dL (06-02-24 @ 08:39)  POCT Blood Glucose.: 177 mg/dL (06-02-24 @ 02:22)  POCT Blood Glucose.: 148 mg/dL (06-02-24 @ 00:01)  POCT Blood Glucose.: 129 mg/dL (06-01-24 @ 22:15)  POCT Blood Glucose.: 49 mg/dL (06-01-24 @ 21:44)  POCT Blood Glucose.: 47 mg/dL (06-01-24 @ 21:26)  POCT Blood Glucose.: 51 mg/dL (06-01-24 @ 21:26)  POCT Blood Glucose.: 89 mg/dL (06-01-24 @ 17:05)  POCT Blood Glucose.: 146 mg/dL (06-01-24 @ 13:01)                          10.5   7.76  )-----------( 151      ( 03 Jun 2024 06:41 )             33.5     06-03    137  |  104  |  32<H>  ----------------------------<  166<H>  4.5   |  18<L>  |  2.06<H>    Ca    8.1<L>      03 Jun 2024 06:40  Phos  4.0     06-03  Mg     2.0     06-03      Urinalysis Basic - ( 03 Jun 2024 06:40 )    Color: x / Appearance: x / SG: x / pH: x  Gluc: 166 mg/dL / Ketone: x  / Bili: x / Urobili: x   Blood: x / Protein: x / Nitrite: x   Leuk Esterase: x / RBC: x / WBC x   Sq Epi: x / Non Sq Epi: x / Bacteria: x    A1C with Estimated Average Glucose Result: A1C with Estimated Average Glucose Result: 7.4 % (03-30-24 @ 08:21)    Thyroid Stimulating Hormone, Serum: 10.20 uIU/mL (05-28-24 @ 06:42)   Patient seen today for follow up inpatient Diabetes Mellitus management.    Chief Complaint: Type 2 Diabetes Mellitus     INTERVAL HX:  Patient seen in [facility] 3DSU 343 W1. Patient is alert and oriented, resting in bed. Friend at bedside. Patient feels she is doing well today. BG have been stable and mostly at goal 100-180mg/dL while on a Consistent Carbohydrate Diet. Patient denies eating snacks at night/bedtime, states she never does. Patient has been tolerating diet well. Blood glucose levels in the last 24hrs have been 151-211mg/dL.     Review of Systems:  General: As above.  Respiratory: Denies any SOB, CURTIS, or cough.  Gastrointestinal: Denies any n/v/d or abdominal pain.   Endocrine: Denies any polyuria, polydypsia, polyphagia, visual changes, or numbness in feet.     Allergies  contrast media (iodine-based) (Fever; Vomiting; Flushing; Hypotension; Rash; Stomach Upset)  Ativan (Rash; Urticaria)  penicillins (Hives (Mod to Severe); Anaphylaxis (Mod to Severe); Short breath (Mod to Severe); Angioedema (Mod to Severe))    Intolerances  None.     MEDICATIONS  (STANDING):  ammonium lactate 12% Lotion 1 Application(s) Topical <User Schedule>  apixaban 5 milliGRAM(s) Oral two times a day  aspirin  chewable 81 milliGRAM(s) Oral daily  atorvastatin 80 milliGRAM(s) Oral at bedtime  calamine/zinc oxide Lotion 1 Application(s) Topical three times a day  dextrose 10% Bolus 125 milliLiter(s) IV Bolus once  dextrose 5%. 1000 milliLiter(s) IV Continuous <Continuous>  dextrose 5%. 1000 milliLiter(s) IV Continuous <Continuous>  dextrose 50% Injectable 25 Gram(s) IV Push once  dextrose 50% Injectable 12.5 Gram(s) IV Push once  dextrose Oral Gel 15 Gram(s) Oral once PRN  droxidopa 600 milliGRAM(s) Oral three times a day  ferrous    sulfate 325 milliGRAM(s) Oral two times a day  fludroCORTISONE 0.2 milliGRAM(s) Oral every 12 hours  glucagon  Injectable 1 milliGRAM(s) IntraMuscular once  insulin glargine Injectable (LANTUS) 14 Unit(s) SubCutaneous at bedtime  insulin lispro (ADMELOG) corrective regimen sliding scale   SubCutaneous three times a day before meals  insulin lispro (ADMELOG) corrective regimen sliding scale   SubCutaneous <User Schedule>  insulin lispro Injectable (ADMELOG) 5 Unit(s) SubCutaneous three times a day before meals  levothyroxine 75 MICROGram(s) Oral daily  midodrine. 20 milliGRAM(s) Oral three times a day  Nephro-molly 1 Tablet(s) Oral daily  nitrofurantoin monohydrate/macrocrystals (MACROBID) 100 milliGRAM(s) Oral two times a day  pantoprazole    Tablet 40 milliGRAM(s) Oral every 24 hours  psyllium Powder 1 Packet(s) Oral two times a day  triamcinolone 0.1% Ointment 1 Application(s) Topical two times a day      atorvastatin 80 milliGRAM(s) Oral at bedtime  dextrose 50% Injectable 25 Gram(s) IV Push once  dextrose 50% Injectable 12.5 Gram(s) IV Push once  dextrose Oral Gel 15 Gram(s) Oral once PRN  fludroCORTISONE 0.2 milliGRAM(s) Oral every 12 hours  glucagon  Injectable 1 milliGRAM(s) IntraMuscular once  insulin glargine Injectable (LANTUS) 14 Unit(s) SubCutaneous at bedtime  insulin lispro (ADMELOG) corrective regimen sliding scale   SubCutaneous three times a day before meals  insulin lispro (ADMELOG) corrective regimen sliding scale   SubCutaneous <User Schedule>  insulin lispro Injectable (ADMELOG) 5 Unit(s) SubCutaneous three times a day before meals  levothyroxine 75 MICROGram(s) Oral daily      insulin lispro (ADMELOG) corrective regimen sliding scale   SubCutaneous three times a day before meals  insulin lispro (ADMELOG) corrective regimen sliding scale   SubCutaneous <User Schedule>  insulin lispro Injectable (ADMELOG) 5 Unit(s) SubCutaneous three times a day before meals      PHYSICAL EXAM:  VITALS:   T(C): 36.7 (06-04-24 @ 04:58), Max: 36.7 (06-04-24 @ 04:58)  HR: 75 (06-04-24 @ 04:58) (68 - 75)  BP: 137/63 (06-04-24 @ 04:58) (137/63 - 152/83)  RR: 18 (06-04-24 @ 04:58) (18 - 18)  SpO2: 93% (06-04-24 @ 04:58) (93% - 100%)    GENERAL: In no acute distress  Respiratory: Respirations unlabored  Extremities: Warm and dry, BLE edema and legs wrapped with ace bandage, c/d/i  Neuro: Alert and orineted, appropriate     LABS:  POCT Blood Glucose.: 171 mg/dL (06-04-24 @ 08:48)  POCT Blood Glucose.: 211 mg/dL (06-04-24 @ 02:25)  POCT Blood Glucose.: 151 mg/dL (06-03-24 @ 21:41)  POCT Blood Glucose.: 199 mg/dL (06-03-24 @ 17:11)  POCT Blood Glucose.: 172 mg/dL (06-03-24 @ 12:28)  POCT Blood Glucose.: 217 mg/dL (06-03-24 @ 08:27)  POCT Blood Glucose.: 179 mg/dL (06-03-24 @ 02:06)  POCT Blood Glucose.: 139 mg/dL (06-02-24 @ 22:26)  POCT Blood Glucose.: 76 mg/dL (06-02-24 @ 21:25)  POCT Blood Glucose.: 159 mg/dL (06-02-24 @ 17:02)  POCT Blood Glucose.: 156 mg/dL (06-02-24 @ 12:48)  POCT Blood Glucose.: 216 mg/dL (06-02-24 @ 08:39)  POCT Blood Glucose.: 177 mg/dL (06-02-24 @ 02:22)  POCT Blood Glucose.: 148 mg/dL (06-02-24 @ 00:01)  POCT Blood Glucose.: 129 mg/dL (06-01-24 @ 22:15)  POCT Blood Glucose.: 49 mg/dL (06-01-24 @ 21:44)  POCT Blood Glucose.: 47 mg/dL (06-01-24 @ 21:26)  POCT Blood Glucose.: 51 mg/dL (06-01-24 @ 21:26)  POCT Blood Glucose.: 89 mg/dL (06-01-24 @ 17:05)  POCT Blood Glucose.: 146 mg/dL (06-01-24 @ 13:01)                          10.5   7.76  )-----------( 151      ( 03 Jun 2024 06:41 )             33.5     06-03    137  |  104  |  32<H>  ----------------------------<  166<H>  4.5   |  18<L>  |  2.06<H>    Ca    8.1<L>      03 Jun 2024 06:40  Phos  4.0     06-03  Mg     2.0     06-03      Urinalysis Basic - ( 03 Jun 2024 06:40 )    Color: x / Appearance: x / SG: x / pH: x  Gluc: 166 mg/dL / Ketone: x  / Bili: x / Urobili: x   Blood: x / Protein: x / Nitrite: x   Leuk Esterase: x / RBC: x / WBC x   Sq Epi: x / Non Sq Epi: x / Bacteria: x    A1C with Estimated Average Glucose Result: A1C with Estimated Average Glucose Result: 7.4 % (03-30-24 @ 08:21)    Thyroid Stimulating Hormone, Serum: 10.20 uIU/mL (05-28-24 @ 06:42)   Patient seen today for follow up inpatient Diabetes Mellitus management.    Chief Complaint: Type 2 Diabetes Mellitus     INTERVAL HX:  Patient seen in [facility] 3DSU 343 W1. Patient is alert and oriented, resting in bed. Friend at bedside. Patient feels she is doing well today. BG have been stable and mostly at goal 100-180mg/dL while on a Consistent Carbohydrate Diet. Patient denies eating snacks at night/bedtime, states she never does. Patient has been tolerating diet well. Patient has variable PO intake affecting BG levels hard to control. Blood glucose levels in the last 24hrs have been 79-211mg/dL.     Review of Systems:  General: As above.  Respiratory: Denies any SOB, CURTIS, or cough.  Gastrointestinal: Denies any n/v/d or abdominal pain.   Endocrine: Denies any polyuria, polydipsia polyphagia, visual changes, or numbness in feet.     Allergies  contrast media (iodine-based) (Fever; Vomiting; Flushing; Hypotension; Rash; Stomach Upset)  Ativan (Rash; Urticaria)  penicillins (Hives (Mod to Severe); Anaphylaxis (Mod to Severe); Short breath (Mod to Severe); Angioedema (Mod to Severe))    Intolerances  None.     MEDICATIONS  (STANDING):  ammonium lactate 12% Lotion 1 Application(s) Topical <User Schedule>  apixaban 5 milliGRAM(s) Oral two times a day  aspirin  chewable 81 milliGRAM(s) Oral daily  atorvastatin 80 milliGRAM(s) Oral at bedtime  calamine/zinc oxide Lotion 1 Application(s) Topical three times a day  dextrose 10% Bolus 125 milliLiter(s) IV Bolus once  dextrose 5%. 1000 milliLiter(s) IV Continuous <Continuous>  dextrose 5%. 1000 milliLiter(s) IV Continuous <Continuous>  dextrose 50% Injectable 25 Gram(s) IV Push once  dextrose 50% Injectable 12.5 Gram(s) IV Push once  dextrose Oral Gel 15 Gram(s) Oral once PRN  droxidopa 600 milliGRAM(s) Oral three times a day  ferrous    sulfate 325 milliGRAM(s) Oral two times a day  fludroCORTISONE 0.2 milliGRAM(s) Oral every 12 hours  glucagon  Injectable 1 milliGRAM(s) IntraMuscular once  insulin glargine Injectable (LANTUS) 14 Unit(s) SubCutaneous at bedtime  insulin lispro (ADMELOG) corrective regimen sliding scale   SubCutaneous three times a day before meals  insulin lispro (ADMELOG) corrective regimen sliding scale   SubCutaneous <User Schedule>  insulin lispro Injectable (ADMELOG) 5 Unit(s) SubCutaneous three times a day before meals  levothyroxine 75 MICROGram(s) Oral daily  midodrine. 20 milliGRAM(s) Oral three times a day  Nephro-molly 1 Tablet(s) Oral daily  nitrofurantoin monohydrate/macrocrystals (MACROBID) 100 milliGRAM(s) Oral two times a day  pantoprazole    Tablet 40 milliGRAM(s) Oral every 24 hours  psyllium Powder 1 Packet(s) Oral two times a day  triamcinolone 0.1% Ointment 1 Application(s) Topical two times a day      atorvastatin 80 milliGRAM(s) Oral at bedtime  dextrose 50% Injectable 25 Gram(s) IV Push once  dextrose 50% Injectable 12.5 Gram(s) IV Push once  dextrose Oral Gel 15 Gram(s) Oral once PRN  fludroCORTISONE 0.2 milliGRAM(s) Oral every 12 hours  glucagon  Injectable 1 milliGRAM(s) IntraMuscular once  insulin glargine Injectable (LANTUS) 14 Unit(s) SubCutaneous at bedtime  insulin lispro (ADMELOG) corrective regimen sliding scale   SubCutaneous three times a day before meals  insulin lispro (ADMELOG) corrective regimen sliding scale   SubCutaneous <User Schedule>  insulin lispro Injectable (ADMELOG) 5 Unit(s) SubCutaneous three times a day before meals  levothyroxine 75 MICROGram(s) Oral daily      insulin lispro (ADMELOG) corrective regimen sliding scale   SubCutaneous three times a day before meals  insulin lispro (ADMELOG) corrective regimen sliding scale   SubCutaneous <User Schedule>  insulin lispro Injectable (ADMELOG) 5 Unit(s) SubCutaneous three times a day before meals      PHYSICAL EXAM:  VITALS:   T(C): 36.7 (06-04-24 @ 04:58), Max: 36.7 (06-04-24 @ 04:58)  HR: 75 (06-04-24 @ 04:58) (68 - 75)  BP: 137/63 (06-04-24 @ 04:58) (137/63 - 152/83)  RR: 18 (06-04-24 @ 04:58) (18 - 18)  SpO2: 93% (06-04-24 @ 04:58) (93% - 100%)    GENERAL: In no acute distress  Respiratory: Respirations unlabored  Extremities: Warm and dry, BLE edema and legs wrapped with ace bandage, c/d/i  Neuro: Alert and oriented appropriate     LABS:  POCT Blood Glucose.: 171 mg/dL (06-04-24 @ 08:48)  POCT Blood Glucose.: 211 mg/dL (06-04-24 @ 02:25)  POCT Blood Glucose.: 151 mg/dL (06-03-24 @ 21:41)  POCT Blood Glucose.: 199 mg/dL (06-03-24 @ 17:11)  POCT Blood Glucose.: 172 mg/dL (06-03-24 @ 12:28)  POCT Blood Glucose.: 217 mg/dL (06-03-24 @ 08:27)  POCT Blood Glucose.: 179 mg/dL (06-03-24 @ 02:06)  POCT Blood Glucose.: 139 mg/dL (06-02-24 @ 22:26)  POCT Blood Glucose.: 76 mg/dL (06-02-24 @ 21:25)  POCT Blood Glucose.: 159 mg/dL (06-02-24 @ 17:02)  POCT Blood Glucose.: 156 mg/dL (06-02-24 @ 12:48)  POCT Blood Glucose.: 216 mg/dL (06-02-24 @ 08:39)  POCT Blood Glucose.: 177 mg/dL (06-02-24 @ 02:22)  POCT Blood Glucose.: 148 mg/dL (06-02-24 @ 00:01)  POCT Blood Glucose.: 129 mg/dL (06-01-24 @ 22:15)  POCT Blood Glucose.: 49 mg/dL (06-01-24 @ 21:44)  POCT Blood Glucose.: 47 mg/dL (06-01-24 @ 21:26)  POCT Blood Glucose.: 51 mg/dL (06-01-24 @ 21:26)  POCT Blood Glucose.: 89 mg/dL (06-01-24 @ 17:05)  POCT Blood Glucose.: 146 mg/dL (06-01-24 @ 13:01)                          10.5   7.76  )-----------( 151      ( 03 Jun 2024 06:41 )             33.5     06-03    137  |  104  |  32<H>  ----------------------------<  166<H>  4.5   |  18<L>  |  2.06<H>    Ca    8.1<L>      03 Jun 2024 06:40  Phos  4.0     06-03  Mg     2.0     06-03      Urinalysis Basic - ( 03 Jun 2024 06:40 )    Color: x / Appearance: x / SG: x / pH: x  Gluc: 166 mg/dL / Ketone: x  / Bili: x / Urobili: x   Blood: x / Protein: x / Nitrite: x   Leuk Esterase: x / RBC: x / WBC x   Sq Epi: x / Non Sq Epi: x / Bacteria: x    A1C with Estimated Average Glucose Result: A1C with Estimated Average Glucose Result: 7.4 % (03-30-24 @ 08:21)    Thyroid Stimulating Hormone, Serum: 10.20 uIU/mL (05-28-24 @ 06:42)

## 2024-06-04 NOTE — PROGRESS NOTE ADULT - ASSESSMENT
73F w HFrEF (EF 30%), mod AS, known LBBB, HTN, IDDM1, CKD, hypothyroidism, chronic lymphedema, suspected OHS/LELIA on 3L NC home O2 p/w 1 episode of syncope. Recent admission at [facility] (3/29-4/5) for ADHF and DUNCAN s/p diuresis, discharged with new home O2 3L NC suspecting OHS/LELIA pending outpatient w/u. Since discharge a week ago, she has been staying at home with   Pt had sob walking to car. Once in car, she was still breathing heavily. Partner noticed pt was not responding to verbal stimuli for 10-15sec and witnessed R arm jerking. Pt gained consciousness quickly without postictal symptoms. Pt went to Cardiologist Dr. Nathan who recommended pt to come to ED. No fever, cp, abd pain, n/v. Leg swelling is improved from prior. Pt on torsemide 40mg which she has been taking. Pt was discharged on 3LNC which she is currently on. Patient reports she did not take Farxiga that she was discharged on as she was concerned about side effects.     In ED, patient was found afebrile, hemodynamically stable, breathing well on 3L NC. Initial labs notable for mild hyponatremia, DUNCAN on CKD, elevated proBNP and elevated pCO2 both improved from prior.  pt also noticed with abn creatinine. had severe AS had ct scan with IV contrast had contrast nephropathy and hypotension ---> CRRT required       1- CKD IV  2- CHF   3- lymphedema   4- severe AS  s/p tavr 4/24  5- hypotension orthostatic       creatinine is overall steady in this range  her edema has been worsening, and bed weight has been rising  diuretics have not been restarted due to persistent orthostasis   orthostatic hypotension,   midodrine 30 mg am, and afternoon  midodrine 20 mg bedtime,  northera 600 mg TID   florinef 0.2mg decreased to daily due to LE edema  continue to check orthostatic bp  also PT for conditioning   anemia, hb higher  now off epogen    non-oliguric,   ID consulted for UTI,   strict I/O   cardiology follow up for further recommendations for orthostasis

## 2024-06-04 NOTE — PROGRESS NOTE ADULT - PROBLEM SELECTOR PLAN 1
- Check BG TID AC, HS, and 2AM  - C/w Lantus 14 units HS  - C/w Admelog 5 units TID AC. Patient feels that 5units works for her when she is eating. If she is not eating she will refuse the dose.    - C/w Low dose Admelog correction scale TID AC, Low dose Admelog correction scale QHS and 2am in case of rebound hypo/hyperglycemia    Discharge:  - Will be determined according to BG/insulin needs/PO intake  at time of discharge; basal/bolus insulin doses TBD  - Of note, patient instructed on discharge from recent hospitalization at [facility] to start Farxiga for CHF. Given risks of DKA of SGLT2i in T1DM, would not start until better data is available for its benefits.  - Follow up with Dr. Luis Dumont outpatient  - Patient will likely required rehab  - F/u with optho/renal/cardiac as outpatient

## 2024-06-04 NOTE — PROGRESS NOTE ADULT - ASSESSMENT
73F w HFrEF (EF 30%), mod AS, known LBBB, HTN, IDDM1, CKD, hypothyroidism, chronic lymphedema, p/w 1 episode of syncope with R arm jerking.     #Syncope-Aortic Stenosis  - Known Aortic Stenosis likely Severe in setting of decreased LVEF  - s/p tavr on 4/24 c/b VT -> shock -> VFib -> shock  - hypoxic/congested, and was intubated, now extubated on 4/25 in the evening, on NC   - on 4/25 with worsening bradycardia, and now s/p TVP placement followed by AV Micra placement with removal of TVP  - Remains in Sinus Rhythm/known lbbb with intermittent   - pressors off and now on high dose midodrine, droxidopa and florinef in the setting of orthostasis. Droxidopa increased.   - Repeat TTE on 5/21 with global LV dysfunction severely decreased. Well seated TAVR   - to consider CRT-D in the future  - Cannot initiate GDMT due to orthostasis  - would try to mobilize as best as possible and monitor orthostatics, hopefully standing bp can be kept >100.   - BP did not drop when sitting up as of 5/31.  - cont asa and statin  - Hgb stable at 10.4      #Chronic Systolic HF (EF 30%), mod to severe AS, HTN, LBBB, Lymphedema   - Has known systolic HF and AS.    - Repeat TTE showed EF 30%, mild-mod LVH, mildly reduced RVF, mod-severe AS (.61 cmsq), mod pHTN  - On home Torsemide 20 mg daily, Eplerenone 25 mg daily, and Toprol  mg daily, all currently on hold  - On Midodrine 20mg TID for orthostatic hypotension  - Droxidopa now initiated as well for significant orthostasis upto 600mg TID  - Unable to initiate GDMT at this time due to significant orthostatic hypotension  - initially CVVHD, then HD  - HD now on hold, as she is urinating and creatinine remains stable,   - Creatinine stable  - prn iv bumex  - renal fu  - Diagnosed with UE DVT and now on full loading dose of Eliquis  - PT and Bed to chair as much as possible    #Orthostatic hypotension.   - Likely in setting of deconditioning.   - would attempt orthostatics daily.  Hoping to wean off midodrine in order to restart GDMT. Orthostatics improving from supine to seated, but still problematic with standing. Remains orthostatic and symptomatic on standing  -Repeat othostatics-improved-Florinef decreased 2/2 increased edema, midodrine increased to 20mg TID as still symptomatic with standing    # Atrial Fibrillation  -Reported episode over the weekend  -Is on AC for DVT  - Will need outpatient monitoring  -Continue telemetry    #UTI  - Abx as per ID, currently on Macrobid   73F w HFrEF (EF 30%), mod AS, known LBBB, HTN, IDDM1, CKD, hypothyroidism, chronic lymphedema, p/w 1 episode of syncope with R arm jerking.     #Syncope-Aortic Stenosis  - Known Aortic Stenosis likely Severe in setting of decreased LVEF  - s/p tavr on 4/24 c/b VT -> shock -> VFib -> shock  - hypoxic/congested, and was intubated, now extubated on 4/25 in the evening, on NC   - on 4/25 with worsening bradycardia, and now s/p TVP placement followed by AV Micra placement with removal of TVP  - Remains in Sinus Rhythm/known lbbb with intermittent   - pressors off and now on high dose midodrine, droxidopa and florinef in the setting of orthostasis. Droxidopa increased.   - Repeat TTE on 5/21 with global LV dysfunction severely decreased. Well seated TAVR   - to consider CRT-D in the future  - Cannot initiate GDMT due to orthostasis  - would try to mobilize as best as possible and monitor orthostatics, hopefully standing bp can be kept >100.   - Still orthostatic  - cont asa and statin  - Hgb stable at 10.5    #Chronic Systolic HF (EF 30%), mod to severe AS, HTN, LBBB, Lymphedema   - Has known systolic HF and AS.    - Repeat TTE showed EF 30%, mild-mod LVH, mildly reduced RVF, mod-severe AS (.61 cmsq), mod pHTN  - On home Torsemide 20 mg daily, Eplerenone 25 mg daily, and Toprol  mg daily, all currently on hold  - On Midodrine 20mg TID for orthostatic hypotension  - Droxidopa now initiated as well for significant orthostasis upto 600mg TID  - Unable to initiate GDMT at this time due to significant orthostatic hypotension  - initially CVVHD, then HD  - HD now on hold, as she is urinating and creatinine remains stable,   - Creatinine stable  - prn iv bumex  - renal fu  - Diagnosed with UE DVT and now on full loading dose of Eliquis  - PT and Bed to chair as much as possible    #Orthostatic hypotension.   - Likely in setting of deconditioning.   - would attempt orthostatics daily.  Hoping to wean off midodrine in order to restart GDMT. Orthostatics improving from supine to seated, but still problematic with standing. Remains orthostatic and symptomatic on standing  -Repeat othostatics-improved-Florinef decreased 2/2 increased edema, midodrine increased to 20mg TID as still symptomatic with standing    # Atrial Fibrillation  -Reported episode over the weekend  -Is on AC for DVT  - Will need outpatient monitoring  -Continue telemetry    #UTI  - Abx as per ID, currently on Macrobid

## 2024-06-05 NOTE — PROGRESS NOTE ADULT - ASSESSMENT
73y Female with right heel DTI, left heel ulcer  - Patient seen and evaluated  - No signs of infection noted  - Wound care recs: please apply IODOSORB to left heel followed by 4x4 Gauze, and Kerlex. DAILY   - STRICT decubitus precautions, Z- FLOWS boots at all time when in bed and in chair resting.   - Patient stable for discharge from podiatry and follow up outpatient with Dr. Meza within ONE week via [phone]  - Will follow up periodically while in house, reconsult sooner if symptoms worsenses

## 2024-06-05 NOTE — PROGRESS NOTE ADULT - PROBLEM SELECTOR PLAN 2
5/29 retaining urine requiring straight cath. On straight cath x2 urine was cloudy/milky in appearance. Hardy catheter replaced. UA with >998 WBC, LE, large blood, >36 epithelial cells concerning for UTI. She reports no other urinary sx.  UCx: E. Coli and VRE E. Faecalis   -continue macrobid (6/3 - 6/5)  -s/p CTX (5/29 - 5/31)  -hardy placed 5/29. 5/29 retaining urine requiring straight cath. On straight cath x2 urine was cloudy/milky in appearance. Hardy catheter replaced. UA with >998 WBC, LE, large blood, >36 epithelial cells concerning for UTI. She reports no other urinary sx.  UCx: E. Coli and VRE E. Faecalis   -continue macrobid (6/3 - 6/6)  -s/p CTX (5/29 - 5/31)  -hardy placed 5/29.

## 2024-06-05 NOTE — PROGRESS NOTE ADULT - PROBLEM SELECTOR PLAN 9
TSH initially mildly elevated 4.79 - 5.06, FT4 1.3-1.5  TSH of 10.20 on 5/28, repeat T4 wnl  - c/w levothyroxine 75 daily as per endo   - Recheck TSH and FT4 outpatient TSH initially mildly elevated 4.79 - 5.06, FT4 1.3-1.5  TSH of 10.20 on 5/28, repeat T4 wnl  - c/w levothyroxine 75mcg daily as per endo   - Recheck TSH and FT4 outpatient

## 2024-06-05 NOTE — PROGRESS NOTE ADULT - PROBLEM SELECTOR PLAN 10
Chronic lymphedema w/ wounds.   - elevate legs as tolerated, encourage ambulation  - compression stockings, ace wrapping  - f/u wound care recs. No

## 2024-06-05 NOTE — PROGRESS NOTE ADULT - PROBLEM SELECTOR PLAN 1
Likely in setting of deconditioning.  Improving with fluids.  Will attempt orthostatics daily.  Hoping to wean off midodrine in order to restart GDMT.  Orthostatics improving from supine to seated, but still problematic with standing.  Remains orthostatic and symptomatic on standing  - leg compression, will attempt abdominal binder however difficult due to body habitus   - OOB as tolerated   - midodrine 30mg TID.  - continue droxydopa 600mg TID  - Continue fludrocortisone 0.2mg daily  - Continue to evaluate standing orthostatics daily Likely in setting of deconditioning.  Improving with fluids.  Will attempt orthostatics daily.  Hoping to wean off midodrine in order to restart GDMT.  Orthostatics improving from supine to seated, but still problematic with standing.  Remains orthostatic and symptomatic on standing  - leg compression, will attempt abdominal binder however difficult due to body habitus   - OOB as tolerated   - midodrine TID (30mg, 30mg, 20mg).  - continue droxydopa 600mg TID  - Continue fludrocortisone 0.2mg daily  - Continue to evaluate standing orthostatics daily  - if no improvement today will start pyridostigmine 30mg bid Likely in setting of deconditioning.  Improving with fluids.  Will attempt orthostatics daily.  Hoping to wean off midodrine in order to restart GDMT.  Orthostatics improving from supine to seated, but still problematic with standing.  Remains orthostatic and symptomatic on standing  - leg compression, will attempt abdominal binder however difficult due to body habitus   - OOB as tolerated   - midodrine TID (30mg, 30mg, 20mg).  - continue droxydopa 600mg TID  - Continue fludrocortisone 0.2mg daily  - Continue to evaluate standing orthostatics daily  - will start pyridostigmine 30mg bid

## 2024-06-05 NOTE — PROGRESS NOTE ADULT - SUBJECTIVE AND OBJECTIVE BOX
[facility]    Howard Kimble, Cherise, Carlos, Alecia, Herman      [phone]    CHIEF COMPLAINT: Patient is a 73y old  Female who presents with a chief complaint of Syncope (04 Jun 2024 17:23)      Follow Up: vol ol, orthostasis    Interim history: The patient reports no new symptoms.  Denies chest discomfort and shortness of breath.  No abdominal pain.  No new neurologic symptoms. Became lightheaded sitting on bed yesterday, did not yet sit or stand today      MEDICATIONS  (STANDING):  ammonium lactate 12% Lotion 1 Application(s) Topical <User Schedule>  apixaban 5 milliGRAM(s) Oral two times a day  aspirin  chewable 81 milliGRAM(s) Oral daily  atorvastatin 80 milliGRAM(s) Oral at bedtime  calamine/zinc oxide Lotion 1 Application(s) Topical three times a day  dextrose 10% Bolus 125 milliLiter(s) IV Bolus once  dextrose 5%. 1000 milliLiter(s) (50 mL/Hr) IV Continuous <Continuous>  dextrose 5%. 1000 milliLiter(s) (100 mL/Hr) IV Continuous <Continuous>  dextrose 50% Injectable 12.5 Gram(s) IV Push once  dextrose 50% Injectable 25 Gram(s) IV Push once  droxidopa 600 milliGRAM(s) Oral three times a day  ferrous    sulfate 325 milliGRAM(s) Oral two times a day  fludroCORTISONE 0.2 milliGRAM(s) Oral daily  glucagon  Injectable 1 milliGRAM(s) IntraMuscular once  insulin glargine Injectable (LANTUS) 14 Unit(s) SubCutaneous at bedtime  insulin lispro (ADMELOG) corrective regimen sliding scale   SubCutaneous <User Schedule>  insulin lispro (ADMELOG) corrective regimen sliding scale   SubCutaneous three times a day before meals  insulin lispro Injectable (ADMELOG) 5 Unit(s) SubCutaneous three times a day before meals  lactated ringers. 1000 milliLiter(s) (100 mL/Hr) IV Continuous <Continuous>  levothyroxine 75 MICROGram(s) Oral daily  midodrine. 30 milliGRAM(s) Oral <User Schedule>  midodrine. 20 milliGRAM(s) Oral <User Schedule>  Nephro-molly 1 Tablet(s) Oral daily  nitrofurantoin monohydrate/macrocrystals (MACROBID) 100 milliGRAM(s) Oral two times a day  pantoprazole    Tablet 40 milliGRAM(s) Oral every 24 hours  psyllium Powder 1 Packet(s) Oral two times a day  triamcinolone 0.1% Ointment 1 Application(s) Topical two times a day    MEDICATIONS  (PRN):  dextrose Oral Gel 15 Gram(s) Oral once PRN Blood Glucose LESS THAN 70 milliGRAM(s)/deciliter      REVIEW OF SYSTEMS:  eye, ent, GI, , allergic, dermatologic, musculoskeletal and neurologic are negative except as described above    Vital Signs Last 24 Hrs  T(C): 36.8 (05 Jun 2024 05:40), Max: 36.8 (05 Jun 2024 05:40)  T(F): 98.3 (05 Jun 2024 05:40), Max: 98.3 (05 Jun 2024 05:40)  HR: 70 (05 Jun 2024 05:40) (70 - 79)  BP: 125/66 (05 Jun 2024 05:40) (125/66 - 151/65)  BP(mean): --  RR: 18 (05 Jun 2024 05:40) (18 - 18)  SpO2: 97% (05 Jun 2024 05:40) (96% - 97%)    Parameters below as of 05 Jun 2024 05:40  Patient On (Oxygen Delivery Method): room air        I&O's Summary    03 Jun 2024 07:01  -  04 Jun 2024 07:00  --------------------------------------------------------  IN: 670 mL / OUT: 1325 mL / NET: -655 mL    04 Jun 2024 07:01  -  05 Jun 2024 06:25  --------------------------------------------------------  IN: 720 mL / OUT: 1925 mL / NET: -1205 mL        Telemetry past 24h: sr first deg avb    PHYSICAL EXAM:    Constitutional: obese, NAD   HEENT:  MMM, sclerae anicteric, conjunctivae clear, no oral cyanosis.  Pulmonary: Non-labored, breath sounds are clear bilaterally, No wheezing, rales or rhonchi  Cardiovascular: Regular, S1 and S2.  No murmur.  No rubs, gallops or clicks  Gastrointestinal: Bowel Sounds present, soft, nontender.   Lymph: No obv peripheral edema. legs wrapped with ace bandages  Neurological: Alert, no focal deficits  Skin: No rashes.  Psych:  Mood & affect appropriate    LABS: All Labs Reviewed:                        10.5   7.76  )-----------( 151      ( 03 Jun 2024 06:41 )             33.5                         10.4   8.11  )-----------( 166      ( 02 Jun 2024 07:05 )             34.2     03 Jun 2024 06:40    137    |  104    |  32     ----------------------------<  166    4.5     |  18     |  2.06   02 Jun 2024 07:05    138    |  104    |  32     ----------------------------<  200    4.5     |  18     |  1.77     Ca    8.1        03 Jun 2024 06:40  Ca    8.5        02 Jun 2024 07:05  Phos  4.0       03 Jun 2024 06:40  Phos  3.7       02 Jun 2024 07:05  Mg     2.0       03 Jun 2024 06:40  Mg     2.0       02 Jun 2024 07:05            Blood Culture:        RADIOLOGY:    EKG:    Echo:  < from: TTE Limited W or WO Ultrasound Enhancing Agent (06.04.24 @ 14:46) >    TRANSTHORACIC ECHOCARDIOGRAM REPORT  ________________________________________________________________________________                                      _______       Pt. Name:       EVELYN LOPEZ Study Date:    6/4/2024  MRN:            [id number]        YOB: 1950  Accession #:    [id number]        Age:           73 years  Account#:       [id number]     Gender:        F  Visit ID#  Heart Rate:     71 bpm           Height:        70.00 in (177.80 cm)  Rhythm:        Weight:        276.00 lb (125.19 kg)  Blood Pressure: 130/70 mmHg      BSA/BMI:       2.39 m² / 39.60 kg/m²  ________________________________________________________________________________________  Referring Physician:    [id number] Cindy Sauceda  Interpreting Physician: Rogers Hughes M.D.  Primary Sonographer:    David Winston [facility]    CPT:                LIMITED SPECTRAL - 80342.m;DOPPL ECHO COLOR FLOW -                      54486.m;ECHO TTE W/O CON F/U LTD - 37084.m  Indication(s):   Syncope and collapse - R55  Procedure:          Limited transthoracic echocardiogram. Color Doppler                      performed. Spectral Doppler performed.  Ordering Location:  [facility]  Admission Status:   Inpatient  Contrast Injection: Patient declined Ultrasound Enhancing Agent.  Study Information:  Image quality for this study is fair.    _______________________________________________________________________________________     CONCLUSIONS:      1. Left ventricular systolic function is moderately decreased with an ejection fraction visually estimated at 35 to 40 %.   2. Multiple segmental abnormalities exist. See findings.   3. Compared to the transthoracic echocardiogram performed on 5/21/2024, there have been no significant intervalchanges.   4. Estimated pulmonary artery systolic pressure is 39 mmHg.   5. The inferior vena cava is normal in size (normal <2.1cm) with normal inspiratory collapse (normal >50%) consistent with normal right atrial pressure (~3, range 0-5mmHg).    ________________________________________________________________________________________  FINDINGS:     Left Ventricle:  Left ventricular systolic function is moderately decreased with an ejection fraction visually estimated at 35 to 40%.  LV Wall Scoring: There is diffuse hypokinesis.          Right Ventricle:  The right ventricular cavity is mildly enlarged in size. Tricuspid annular plane systolic excursion (TAPSE) is 2.9 cm (normal >=1.7 cm).     Aortic Valve:  A a transcatheter deployed (TAVR)is present in the aortic position. The peak transaortic velocity is 2.47 m/s, peak transaortic gradient is 24.4 mmHg and mean transaortic gradient is 13.0 mmHg with an LVOT/aortic valve VTI ratio of 0.32.     Mitral Valve:  Structurally normal mitralvalve with normal leaflet excursion. There is mild mitral regurgitation.     Tricuspid Valve:  There is mild tricuspid regurgitation. Estimated pulmonary artery systolic pressure is 39 mmHg.     Pulmonic Valve:  There is mild pulmonic regurgitation.     Systemic Veins:  The inferior vena cava is normal in size (normal <2.1cm) with normal inspiratory collapse (normal >50%) consistent with normal right atrial pressure (~3, range 0-5mmHg).  ____________________________________________________________________  QUANTITATIVE DATA:  Left Ventricle Measurements: (Indexed to BSA)     IVSd (2D):   1.2 cm  LVPWd (2D):  1.1 cm  LVIDd (2D):  6.2 cm  LVIDs (2D):  4.3 cm  LV Mass:     312 g  130.2 g/m²  Visualized LV EF%: 35 to 40%     e' lateral: 11.70 cm/s  e' medial:  5.87 cm/s    Aorta Measurements: (Normal range) (Indexed to BSA)     Sinuses of Valsalva: 3.40 cm (2.7 - 3.3 cm)       Left Atrium Measurements: (Indexed to BSA)  LA Diam 2D: 5.30 cm    Right Ventricle Measurements:     TAPSE:2.9 cm  RV S' Vmax:      10.90 cm/s  RV Base (RVID1): 4.6 cm  RV Mid (RVID2):  4.4 cm       LVOT / RVOT/ Qp/Qs Data: (Indexed to BSA)  LVOT Vmax:      0.79 m/s  LVOT Vmn:       0.503 m/s  LVOT VTI:       18.00 cm  LVOT peak grad: 2 mmHg  LVOT mean grad: 1.0 mmHg    Aortic Valve Measurements:  AV Vmax:                2.5 m/s  AV Peak Gradient:       24.4 mmHg  AV Mean Gradient:       13.0 mmHg  AV VTI:                 55.7 cm  AV VTI Ratio:           0.32  AoV Dimensionless Index 0.32       Tricuspid Valve Measurements:     TR Vmax:          3.0 m/s  TR Peak Gradient: 35.5 mmHg  RA Pressure:      3 mmHg  PASP:             39 mmHg    ________________________________________________________________________________________  Electronically signed on 6/4/2024 at 5:57:36 PM by Rogers Hughes M.D.         *** Final ***    < end of copied text >

## 2024-06-05 NOTE — PROGRESS NOTE ADULT - ASSESSMENT
73F w HFrEF (EF 30%), mod AS, known LBBB, HTN, IDDM1, CKD, hypothyroidism, chronic lymphedema, suspected OHS/LELIA on 3L NC home O2 p/w 1 episode of syncope. Recent admission at [facility] (3/29-4/5) for ADHF and DUNCAN s/p diuresis, discharged with new home O2 3L NC suspecting OHS/LELIA pending outpatient w/u. Since discharge a week ago, she has been staying at home with   Pt had sob walking to car. Once in car, she was still breathing heavily. Partner noticed pt was not responding to verbal stimuli for 10-15sec and witnessed R arm jerking. Pt gained consciousness quickly without postictal symptoms. Pt went to Cardiologist Dr. Nathan who recommended pt to come to ED. No fever, cp, abd pain, n/v. Leg swelling is improved from prior. Pt on torsemide 40mg which she has been taking. Pt was discharged on 3LNC which she is currently on. Patient reports she did not take Farxiga that she was discharged on as she was concerned about side effects.     In ED, patient was found afebrile, hemodynamically stable, breathing well on 3L NC. Initial labs notable for mild hyponatremia, DUNCAN on CKD, elevated proBNP and elevated pCO2 both improved from prior.  pt also noticed with abn creatinine. had severe AS had ct scan with IV contrast had contrast nephropathy and hypotension ---> CRRT required       1- CKD IV  2- CHF   3- lymphedema   4- severe AS  s/p tavr 4/24  5- hypotension orthostatic       creatinine is overall steady in this range to have lytes and cr checked in am   her edema has been worsening, and bed weight has been rising  diuretics have not been restarted due to persistent orthostasis   orthostatic hypotension,   midodrine 30 mg am, and afternoon  midodrine 20 mg bedtime,  northera 600 mg TID   florinef 0.2mg decreased to daily due to LE edema with goal to taper further slowly   continue to check orthostatic bp  also PT for conditioning   anemia, hb higher  now off epogen    non-oliguric,   ID consulted for UTI,   strict I/O   cardiology follow up

## 2024-06-05 NOTE — PROGRESS NOTE ADULT - ATTENDING COMMENTS
73F PMH: HFrEF (EF 30%), AS, LBBB, HTN, DM1, CKD, hypothyroidism, chronic lymphedema, LELIA (3L home O2) p/w for syncope 2/2 severe AS, s/p TAVR 4/24. Course complicated by contrast induced allergic reaction (had CTA for TAVR eval) and contrast related renal failure (acute on chronic) requiring HD. TAVR c/b VT and vfib requiring shock and flash pulmonary edema causing Acute hypoxic respiratory failure  requiring intubation.  Course complicated by contrast induced allergic reaction (had CTA for TAVR eval) and contrast related renal failure (acute on chronic) requiring CRRT for fluid removal and pressors for vasoplegia and hypovolemia due to CRRT. Patient extubated, now off pressors, CRRT.     1. Orthostatic hypotension- severe and persistent despite droxydopa, midodrine with florinef. Given IV fluids overnight, am and afternoon Midodrine increased. Seen w PT again today- some improvement but still orthostatic. Started on Pyridostigmine. Reassess tomorrow.       2. Urinary retention- failed 3 voiding trials.   3. Left UE DVT- c/w eliquis. Repeat US showing persistent DVT noted in the left subclavian and axillary veins. ACE wrap for swelling.   4. T1DM- uncontrolled with intermittent episodes of labile BS- . Being managed by endocrinology. Currently on Lantus and Admelog.

## 2024-06-05 NOTE — PROGRESS NOTE ADULT - SUBJECTIVE AND OBJECTIVE BOX
Marcial Erickson MD  PGY 1 Department of Internal Medicine        Patient is a 73y old  Female who presents with a chief complaint of Syncope (05 Jun 2024 06:24)      SUBJECTIVE / OVERNIGHT EVENTS: Pt seen and examined. No acute overnight events. Denies fevers, chills, CP, SOB, Abdominal pain, N/V, Constipation, Diarrhea        MEDICATIONS  (STANDING):  ammonium lactate 12% Lotion 1 Application(s) Topical <User Schedule>  apixaban 5 milliGRAM(s) Oral two times a day  aspirin  chewable 81 milliGRAM(s) Oral daily  atorvastatin 80 milliGRAM(s) Oral at bedtime  calamine/zinc oxide Lotion 1 Application(s) Topical three times a day  dextrose 10% Bolus 125 milliLiter(s) IV Bolus once  dextrose 5%. 1000 milliLiter(s) (50 mL/Hr) IV Continuous <Continuous>  dextrose 5%. 1000 milliLiter(s) (100 mL/Hr) IV Continuous <Continuous>  dextrose 50% Injectable 25 Gram(s) IV Push once  dextrose 50% Injectable 12.5 Gram(s) IV Push once  droxidopa 600 milliGRAM(s) Oral three times a day  ferrous    sulfate 325 milliGRAM(s) Oral two times a day  fludroCORTISONE 0.2 milliGRAM(s) Oral daily  glucagon  Injectable 1 milliGRAM(s) IntraMuscular once  insulin glargine Injectable (LANTUS) 14 Unit(s) SubCutaneous at bedtime  insulin lispro (ADMELOG) corrective regimen sliding scale   SubCutaneous <User Schedule>  insulin lispro (ADMELOG) corrective regimen sliding scale   SubCutaneous three times a day before meals  insulin lispro Injectable (ADMELOG) 5 Unit(s) SubCutaneous three times a day before meals  lactated ringers. 1000 milliLiter(s) (100 mL/Hr) IV Continuous <Continuous>  levothyroxine 75 MICROGram(s) Oral daily  midodrine. 30 milliGRAM(s) Oral <User Schedule>  midodrine. 20 milliGRAM(s) Oral <User Schedule>  Nephro-molly 1 Tablet(s) Oral daily  nitrofurantoin monohydrate/macrocrystals (MACROBID) 100 milliGRAM(s) Oral two times a day  pantoprazole    Tablet 40 milliGRAM(s) Oral every 24 hours  psyllium Powder 1 Packet(s) Oral two times a day  triamcinolone 0.1% Ointment 1 Application(s) Topical two times a day    MEDICATIONS  (PRN):  dextrose Oral Gel 15 Gram(s) Oral once PRN Blood Glucose LESS THAN 70 milliGRAM(s)/deciliter      I&O's Summary    04 Jun 2024 07:01  -  05 Jun 2024 07:00  --------------------------------------------------------  IN: 720 mL / OUT: 1925 mL / NET: -1205 mL        Vital Signs Last 24 Hrs  T(C): 36.8 (05 Jun 2024 05:40), Max: 36.8 (05 Jun 2024 05:40)  T(F): 98.3 (05 Jun 2024 05:40), Max: 98.3 (05 Jun 2024 05:40)  HR: 70 (05 Jun 2024 05:40) (70 - 79)  BP: 125/66 (05 Jun 2024 05:40) (125/66 - 151/65)  BP(mean): --  RR: 18 (05 Jun 2024 05:40) (18 - 18)  SpO2: 97% (05 Jun 2024 05:40) (96% - 97%)    Parameters below as of 05 Jun 2024 05:40  Patient On (Oxygen Delivery Method): room air        CAPILLARY BLOOD GLUCOSE      POCT Blood Glucose.: 160 mg/dL (05 Jun 2024 02:22)  POCT Blood Glucose.: 136 mg/dL (04 Jun 2024 21:35)  POCT Blood Glucose.: 164 mg/dL (04 Jun 2024 17:36)  POCT Blood Glucose.: 79 mg/dL (04 Jun 2024 12:43)  POCT Blood Glucose.: 171 mg/dL (04 Jun 2024 08:48)      PHYSICAL EXAM:  GENERAL: NAD,   HEAD:  Atraumatic, Normocephalic  EYES: EOMI, conjunctiva and sclera clear  ENMT: Moist mucous membranes  CHEST/LUNG: Clear to auscultation bilaterally; No rales, rhonchi, wheezing, or rubs  HEART: Regular rate and rhythm, systolic murmur  ABDOMEN: abdominal binder in place  EXTREMITIES:  2+ Peripheral Pulses, 1-2+ b/l edema up to thighs in ace wrap. LUE in dressing with edema and erythema but no warmth or tenderness. Full ROM with strength/sensation/pulses intact  SKIN: erythematous, macular rash to anterior chest wall, neck, and upper extremities improving  NERVOUS SYSTEM:  Alert, no focal deficits  PSYCH:  Appropriate affect       LABS:                      RADIOLOGY & ADDITIONAL TESTS:    Imaging Personally Reviewed:    Consultant(s) Notes Reviewed:      Care Discussed with Consultants/Other Providers:   Marcial Erickson MD  PGY 1 Department of Internal Medicine        Patient is a 73y old  Female who presents with a chief complaint of Syncope (05 Jun 2024 06:24)      SUBJECTIVE / OVERNIGHT EVENTS: Pt seen and examined. No acute overnight events. Denies fevers, chills, CP, SOB, Abdominal pain, N/V, Constipation, Diarrhea        MEDICATIONS  (STANDING):  ammonium lactate 12% Lotion 1 Application(s) Topical <User Schedule>  apixaban 5 milliGRAM(s) Oral two times a day  aspirin  chewable 81 milliGRAM(s) Oral daily  atorvastatin 80 milliGRAM(s) Oral at bedtime  calamine/zinc oxide Lotion 1 Application(s) Topical three times a day  dextrose 10% Bolus 125 milliLiter(s) IV Bolus once  dextrose 5%. 1000 milliLiter(s) (50 mL/Hr) IV Continuous <Continuous>  dextrose 5%. 1000 milliLiter(s) (100 mL/Hr) IV Continuous <Continuous>  dextrose 50% Injectable 25 Gram(s) IV Push once  dextrose 50% Injectable 12.5 Gram(s) IV Push once  droxidopa 600 milliGRAM(s) Oral three times a day  ferrous    sulfate 325 milliGRAM(s) Oral two times a day  fludroCORTISONE 0.2 milliGRAM(s) Oral daily  glucagon  Injectable 1 milliGRAM(s) IntraMuscular once  insulin glargine Injectable (LANTUS) 14 Unit(s) SubCutaneous at bedtime  insulin lispro (ADMELOG) corrective regimen sliding scale   SubCutaneous <User Schedule>  insulin lispro (ADMELOG) corrective regimen sliding scale   SubCutaneous three times a day before meals  insulin lispro Injectable (ADMELOG) 5 Unit(s) SubCutaneous three times a day before meals  lactated ringers. 1000 milliLiter(s) (100 mL/Hr) IV Continuous <Continuous>  levothyroxine 75 MICROGram(s) Oral daily  midodrine. 30 milliGRAM(s) Oral <User Schedule>  midodrine. 20 milliGRAM(s) Oral <User Schedule>  Nephro-molly 1 Tablet(s) Oral daily  nitrofurantoin monohydrate/macrocrystals (MACROBID) 100 milliGRAM(s) Oral two times a day  pantoprazole    Tablet 40 milliGRAM(s) Oral every 24 hours  psyllium Powder 1 Packet(s) Oral two times a day  triamcinolone 0.1% Ointment 1 Application(s) Topical two times a day    MEDICATIONS  (PRN):  dextrose Oral Gel 15 Gram(s) Oral once PRN Blood Glucose LESS THAN 70 milliGRAM(s)/deciliter      I&O's Summary    04 Jun 2024 07:01  -  05 Jun 2024 07:00  --------------------------------------------------------  IN: 720 mL / OUT: 1925 mL / NET: -1205 mL        Vital Signs Last 24 Hrs  T(C): 36.8 (05 Jun 2024 05:40), Max: 36.8 (05 Jun 2024 05:40)  T(F): 98.3 (05 Jun 2024 05:40), Max: 98.3 (05 Jun 2024 05:40)  HR: 70 (05 Jun 2024 05:40) (70 - 79)  BP: 125/66 (05 Jun 2024 05:40) (125/66 - 151/65)  BP(mean): --  RR: 18 (05 Jun 2024 05:40) (18 - 18)  SpO2: 97% (05 Jun 2024 05:40) (96% - 97%)    Parameters below as of 05 Jun 2024 05:40  Patient On (Oxygen Delivery Method): room air        CAPILLARY BLOOD GLUCOSE      POCT Blood Glucose.: 160 mg/dL (05 Jun 2024 02:22)  POCT Blood Glucose.: 136 mg/dL (04 Jun 2024 21:35)  POCT Blood Glucose.: 164 mg/dL (04 Jun 2024 17:36)  POCT Blood Glucose.: 79 mg/dL (04 Jun 2024 12:43)  POCT Blood Glucose.: 171 mg/dL (04 Jun 2024 08:48)      PHYSICAL EXAM:  GENERAL: NAD,   HEAD:  Atraumatic, Normocephalic  EYES: EOMI, conjunctiva and sclera clear  ENMT: Moist mucous membranes  CHEST/LUNG: Clear to auscultation bilaterally; No rales, rhonchi, wheezing, or rubs  HEART: Regular rate and rhythm, systolic murmur  ABDOMEN: soft nondistended nontender  EXTREMITIES:  2+ Peripheral Pulses, 1-2+ b/l edema up to thighs in ace wrap. LUE in dressing with edema and erythema but no warmth or tenderness. Full ROM with strength/sensation/pulses intact  SKIN: erythematous, macular rash to anterior chest wall, neck, and upper extremities improving  NERVOUS SYSTEM:  Alert, no focal deficits  PSYCH:  Appropriate affect       LABS:                      RADIOLOGY & ADDITIONAL TESTS:    Imaging Personally Reviewed:    Consultant(s) Notes Reviewed:      Care Discussed with Consultants/Other Providers:

## 2024-06-05 NOTE — PROGRESS NOTE ADULT - SUBJECTIVE AND OBJECTIVE BOX
[facility]   [phone]  RENAL FOLLOW UP NOTE  --------------------------------------------------------------------------------  Chief Complaint:    24 hour events/subjective:    seen earlier sitting in chair   states able to stand up today     PAST HISTORY  --------------------------------------------------------------------------------  No significant changes to PMH, PSH, FHx, SHx, unless otherwise noted    ALLERGIES & MEDICATIONS  --------------------------------------------------------------------------------  Allergies    contrast media (iodine-based) (Fever; Vomiting; Flushing; Hypotension; Rash; Stomach Upset)  Ativan (Rash; Urticaria)  penicillins (Hives (Mod to Severe); Anaphylaxis (Mod to Severe); Short breath (Mod to Severe); Angioedema (Mod to Severe))    Intolerances      Standing Inpatient Medications  ammonium lactate 12% Lotion 1 Application(s) Topical <User Schedule>  apixaban 5 milliGRAM(s) Oral two times a day  aspirin  chewable 81 milliGRAM(s) Oral daily  atorvastatin 80 milliGRAM(s) Oral at bedtime  calamine/zinc oxide Lotion 1 Application(s) Topical three times a day  dextrose 10% Bolus 125 milliLiter(s) IV Bolus once  dextrose 5%. 1000 milliLiter(s) IV Continuous <Continuous>  dextrose 5%. 1000 milliLiter(s) IV Continuous <Continuous>  dextrose 50% Injectable 12.5 Gram(s) IV Push once  dextrose 50% Injectable 25 Gram(s) IV Push once  droxidopa 600 milliGRAM(s) Oral three times a day  ferrous    sulfate 325 milliGRAM(s) Oral two times a day  fludroCORTISONE 0.2 milliGRAM(s) Oral daily  glucagon  Injectable 1 milliGRAM(s) IntraMuscular once  insulin glargine Injectable (LANTUS) 14 Unit(s) SubCutaneous at bedtime  insulin lispro (ADMELOG) corrective regimen sliding scale   SubCutaneous <User Schedule>  insulin lispro (ADMELOG) corrective regimen sliding scale   SubCutaneous three times a day before meals  insulin lispro Injectable (ADMELOG) 5 Unit(s) SubCutaneous three times a day before meals  midodrine. 30 milliGRAM(s) Oral <User Schedule>  midodrine. 20 milliGRAM(s) Oral <User Schedule>  Nephro-molly 1 Tablet(s) Oral daily  nitrofurantoin monohydrate/macrocrystals (MACROBID) 100 milliGRAM(s) Oral two times a day  pantoprazole    Tablet 40 milliGRAM(s) Oral every 24 hours  psyllium Powder 1 Packet(s) Oral two times a day  pyridostigmine 30 milliGRAM(s) Oral two times a day  triamcinolone 0.1% Ointment 1 Application(s) Topical two times a day    PRN Inpatient Medications  dextrose Oral Gel 15 Gram(s) Oral once PRN      REVIEW OF SYSTEMS  --------------------------------------------------------------------------------    Gen: denies  fevers/chills,  CVS: denies chest pain/palpitations  Resp: denies SOB/Cough  GI: Denies N/V/Abd pain  : Denies dysuria      VITALS/PHYSICAL EXAM  --------------------------------------------------------------------------------  T(C): 36.5 (06-05-24 @ 16:30), Max: 36.8 (06-05-24 @ 05:40)  HR: 74 (06-05-24 @ 16:30) (70 - 99)  BP: 118/70 (06-05-24 @ 16:30) (118/70 - 147/74)  RR: 18 (06-05-24 @ 16:30) (18 - 18)  SpO2: 97% (06-05-24 @ 16:30) (94% - 97%)  Wt(kg): --        06-04-24 @ 07:01  -  06-05-24 @ 07:00  --------------------------------------------------------  IN: 720 mL / OUT: 1925 mL / NET: -1205 mL    06-05-24 @ 07:01  -  06-05-24 @ 18:19  --------------------------------------------------------  IN: 360 mL / OUT: 350 mL / NET: 10 mL      Physical Exam:  	       Gen: comfortable appearing   	Pulm: decrease bs  no rales or ronchi or wheezing  	CV: No JVD. RRR, S1S2; no rub II/VI M   	Abd: +BS, soft, nontender/nondistended, obese   	UE: Warm, no cyanosis  no clubbing, no edema  	LE: Warm, no cyanosis  no clubbing, 2- edema, B/L ACE bandage  	Neuro: alert and oriented       LABS/STUDIES  --------------------------------------------------------------------------------      Creatinine Trend:  SCr 2.06 [06-03 @ 06:40]  SCr 1.77 [06-02 @ 07:05]  SCr 1.95 [06-01 @ 12:06]  SCr 2.50 [05-30 @ 06:51]  SCr 2.39 [05-29 @ 07:02]            PTH -- (Ca 8.4)      [04-19-24 @ 17:54]   109  TSH 10.20      [05-28-24 @ 06:42]  Lipid: chol 74, TG 70, HDL 33, LDL --      [04-13-24 @ 07:05]

## 2024-06-05 NOTE — PROGRESS NOTE ADULT - ASSESSMENT
73F w HFrEF (EF 30%), mod AS, known LBBB, HTN, IDDM1, CKD, hypothyroidism, chronic lymphedema, p/w 1 episode of syncope with R arm jerking.     #Syncope-Aortic Stenosis  - AS in setting of decreased LVEF  - s/p tavr on 4/24 c/b VT -> shock -> VFib -> shock  - hypoxic/congested, and was intubated, extubated on 4/25  - on 4/25 with worsening bradycardia, s/p TVP placement followed by AV Micra placement   - Remains in Sinus Rhythm/known lbbb with intermittent   - on high dose midodrine, droxidopa and florinef in the setting of orthostasis. Droxidopa increased.   - TTE on 5/21 with global LV dysfunction severely decreased. Well seated TAVR, limited echo 6/4 with unchanged mod lv and no sig changes overall  - to consider CRT-D in the future  - Cannot initiate GDMT due to orthostasis  - would try to mobilize as best as possible and monitor orthostatics, hopefully standing bp can be kept >100.   - Still orthostatic  - cont asa and statin  - Hgb stable     #Chronic Systolic HF (EF 30%), mod to severe AS, HTN, LBBB, Lymphedema   - Has known systolic HF and AS.    - Repeat TTE showed EF 30%, mild-mod LVH, mildly reduced RVF, mod-severe AS (.61 cmsq), mod pHTN  - On home Torsemide 20 mg daily, Eplerenone 25 mg daily, and Toprol  mg daily, all currently on hold  - On Midodrine 20mg TID for orthostatic hypotension  - Droxidopa now initiated as well for significant orthostasis upto 600mg TID  - Unable to initiate GDMT at this time due to significant orthostatic hypotension  - initially CVVHD, then HD  - HD now on hold, as she is urinating and creatinine remains stable,   - Creatinine stable  - prn iv bumex  - renal fu noted  - Diagnosed with UE DVT and now on full loading dose of Eliquis  - PT and Bed to chair as much as possible    #Orthostatic hypotension.   - Likely in setting of deconditioning.   - would attempt orthostatics daily.  Hoping to wean off midodrine in order to restart GDMT. Orthostatics improving from supine to seated, but still problematic with standing. Remains orthostatic and symptomatic on standing  -Repeat othostatics-improved-Florinef decreased 2/2 increased edema, midodrine increased to 20mg TID as still symptomatic with standing    # Atrial Fibrillation  -Reported episode over the weekend  -Is on AC for DVT  - Will need outpatient monitoring  -Continue telemetry    #UTI  - Abx as per ID, currently on Macrobid

## 2024-06-05 NOTE — PROGRESS NOTE ADULT - SUBJECTIVE AND OBJECTIVE BOX
Patient is a 73y old  Female who presents with a chief complaint of Syncope (05 Jun 2024 07:06)       INTERVAL HPI/OVERNIGHT EVENTS:  Patient seen and evaluated at bedside.  Pt is resting comfortable in NAD    Allergies    contrast media (iodine-based) (Fever; Vomiting; Flushing; Hypotension; Rash; Stomach Upset)  Ativan (Rash; Urticaria)  penicillins (Hives (Mod to Severe); Anaphylaxis (Mod to Severe); Short breath (Mod to Severe); Angioedema (Mod to Severe))    Intolerances        Vital Signs Last 24 Hrs  T(C): 36.8 (05 Jun 2024 05:40), Max: 36.8 (05 Jun 2024 05:40)  T(F): 98.3 (05 Jun 2024 05:40), Max: 98.3 (05 Jun 2024 05:40)  HR: 99 (05 Jun 2024 11:29) (70 - 99)  BP: 147/74 (05 Jun 2024 11:29) (125/66 - 147/74)  BP(mean): --  RR: 18 (05 Jun 2024 05:40) (18 - 18)  SpO2: 94% (05 Jun 2024 11:29) (94% - 97%)    Parameters below as of 05 Jun 2024 11:29  Patient On (Oxygen Delivery Method): room air        LABS:              CAPILLARY BLOOD GLUCOSE      POCT Blood Glucose.: 100 mg/dL (05 Jun 2024 12:54)  POCT Blood Glucose.: 133 mg/dL (05 Jun 2024 08:39)  POCT Blood Glucose.: 160 mg/dL (05 Jun 2024 02:22)  POCT Blood Glucose.: 136 mg/dL (04 Jun 2024 21:35)  POCT Blood Glucose.: 164 mg/dL (04 Jun 2024 17:36)      Lower Extremity Physical Exam:  Vascular: DP/PT 0/4, B/L, CFT < 3seconds B/L, Temperature gradient WNL, B/L, edema bilat LE   Neuro: Epicritic sensation intact to the level of forefoot, B/L  Musculoskeletal/Ortho: 3+ pitting edema globally to bilateral foot  Skin: L foot heel pressure ulcer to subQ with no acute signs of infection 0.7x0.7x0.1 cm in size R foot heel DTI, no skin breakdown

## 2024-06-06 NOTE — PROGRESS NOTE ADULT - SUBJECTIVE AND OBJECTIVE BOX
Marcial Erickson MD  PGY 1 Department of Internal Medicine        Patient is a 73y old  Female who presents with a chief complaint of Syncope (05 Jun 2024 18:18)      SUBJECTIVE / OVERNIGHT EVENTS: Pt seen and examined. No acute overnight events. Denies CP, SOB, Abdominal pain, N/V, Diarrhea        MEDICATIONS  (STANDING):  ammonium lactate 12% Lotion 1 Application(s) Topical <User Schedule>  apixaban 5 milliGRAM(s) Oral two times a day  aspirin  chewable 81 milliGRAM(s) Oral daily  atorvastatin 80 milliGRAM(s) Oral at bedtime  calamine/zinc oxide Lotion 1 Application(s) Topical three times a day  dextrose 10% Bolus 125 milliLiter(s) IV Bolus once  dextrose 5%. 1000 milliLiter(s) (100 mL/Hr) IV Continuous <Continuous>  dextrose 5%. 1000 milliLiter(s) (50 mL/Hr) IV Continuous <Continuous>  dextrose 50% Injectable 12.5 Gram(s) IV Push once  dextrose 50% Injectable 25 Gram(s) IV Push once  droxidopa 600 milliGRAM(s) Oral three times a day  ferrous    sulfate 325 milliGRAM(s) Oral two times a day  fludroCORTISONE 0.2 milliGRAM(s) Oral daily  glucagon  Injectable 1 milliGRAM(s) IntraMuscular once  insulin glargine Injectable (LANTUS) 14 Unit(s) SubCutaneous at bedtime  insulin lispro (ADMELOG) corrective regimen sliding scale   SubCutaneous <User Schedule>  insulin lispro (ADMELOG) corrective regimen sliding scale   SubCutaneous three times a day before meals  insulin lispro Injectable (ADMELOG) 5 Unit(s) SubCutaneous three times a day before meals  levothyroxine 75 MICROGram(s) Oral daily  midodrine. 30 milliGRAM(s) Oral <User Schedule>  midodrine. 20 milliGRAM(s) Oral <User Schedule>  Nephro-molly 1 Tablet(s) Oral daily  pantoprazole    Tablet 40 milliGRAM(s) Oral every 24 hours  psyllium Powder 1 Packet(s) Oral two times a day  pyridostigmine 30 milliGRAM(s) Oral two times a day  triamcinolone 0.1% Ointment 1 Application(s) Topical two times a day    MEDICATIONS  (PRN):  dextrose Oral Gel 15 Gram(s) Oral once PRN Blood Glucose LESS THAN 70 milliGRAM(s)/deciliter      I&O's Summary    05 Jun 2024 07:01  -  06 Jun 2024 07:00  --------------------------------------------------------  IN: 780 mL / OUT: 825 mL / NET: -45 mL        Vital Signs Last 24 Hrs  T(C): 36.7 (06 Jun 2024 04:14), Max: 36.7 (06 Jun 2024 04:14)  T(F): 98.1 (06 Jun 2024 04:14), Max: 98.1 (06 Jun 2024 04:14)  HR: 76 (06 Jun 2024 04:14) (71 - 99)  BP: 131/66 (06 Jun 2024 04:14) (110/68 - 147/74)  BP(mean): --  RR: 18 (06 Jun 2024 04:14) (18 - 18)  SpO2: 96% (06 Jun 2024 04:14) (94% - 97%)    Parameters below as of 06 Jun 2024 04:14  Patient On (Oxygen Delivery Method): room air        CAPILLARY BLOOD GLUCOSE      POCT Blood Glucose.: 112 mg/dL (06 Jun 2024 02:43)  POCT Blood Glucose.: 111 mg/dL (05 Jun 2024 22:53)  POCT Blood Glucose.: 103 mg/dL (05 Jun 2024 21:50)  POCT Blood Glucose.: 170 mg/dL (05 Jun 2024 17:14)  POCT Blood Glucose.: 100 mg/dL (05 Jun 2024 12:54)  POCT Blood Glucose.: 133 mg/dL (05 Jun 2024 08:39)      PHYSICAL EXAM:  GENERAL: NAD,   HEAD:  Atraumatic, Normocephalic  EYES: EOMI, conjunctiva and sclera clear  ENMT: Moist mucous membranes  CHEST/LUNG: Clear to auscultation bilaterally; No rales, rhonchi, wheezing, or rubs  HEART: Regular rate and rhythm, systolic murmur  ABDOMEN: soft nondistended nontender  EXTREMITIES:  2+ Peripheral Pulses, 1-2+ b/l edema up to thighs in ace wrap. LUE in dressing with edema and erythema but no warmth or tenderness. Full ROM with strength/sensation/pulses intact  SKIN: erythematous, macular rash to anterior chest wall, neck, and upper extremities improving  NERVOUS SYSTEM:  Alert, no focal deficits  PSYCH:  Appropriate affect       LABS:                      RADIOLOGY & ADDITIONAL TESTS:    Imaging Personally Reviewed:    Consultant(s) Notes Reviewed:      Care Discussed with Consultants/Other Providers:

## 2024-06-06 NOTE — PROGRESS NOTE ADULT - PROBLEM SELECTOR PLAN 2
5/29 retaining urine requiring straight cath. On straight cath x2 urine was cloudy/milky in appearance. Hardy catheter replaced. UA with >998 WBC, LE, large blood, >36 epithelial cells concerning for UTI. She reports no other urinary sx.  UCx: E. Coli and VRE E. Faecalis   -continue macrobid (6/3 - 6/6)  -s/p CTX (5/29 - 5/31)  -hardy placed 5/29.

## 2024-06-06 NOTE — PROGRESS NOTE ADULT - PROBLEM SELECTOR PLAN 7
EF from 4/2024 (post-TAVR) w/ EF 25%, global LV hypokinesis, moderately dilated LV, grade 2 LV diastolic dysfunction, RA moderately dilated.  Repeat TTE with reduced LV function.  TAVR remains well seated  - cards following, will f/u with cardiology regarding resuming GDMT  - per cards, overnight telemetry 6/1 consistent with afib, recommending to continue monitoring on tele  - continue midodrine 15mg TID   - Continue atorvastatin 80mg daily  - hold home torsemide 20mg qd, hold home eplerenone 25mg qd, hold home metoprolol succinate 100mg qd  - f/u repeat limited TTE  - f/u w/ EP outpatient for CRT.    #Aortic Stenosis  Presented for syncope secondary to severe AS, s/p TAVR on 4/24 c/b by VT -> shock -> VFib -> shock.  Course complicated w/ symptomatic bradycardia, requiring TVP, now s/p micra PPM. TAVR well seated on recent TTE without regurgitation  - continue aspirin 81mg daily. EF from 4/2024 (post-TAVR) w/ EF 25%, global LV hypokinesis, moderately dilated LV, grade 2 LV diastolic dysfunction, RA moderately dilated.  Repeat TTE with reduced LV function.  TAVR remains well seated  - cards following, will f/u with cardiology regarding resuming GDMT  - per cards, overnight telemetry 6/1 consistent with afib, recommending to continue monitoring on tele  - continue midodrine 15mg TID   - Continue atorvastatin 80mg daily  - hold home torsemide 20mg qd, hold home eplerenone 25mg qd, hold home metoprolol succinate 100mg qd  - Repeat TTE 6/4 predominantly unchanged   - f/u w/ EP outpatient for CRT.    #Aortic Stenosis  Presented for syncope secondary to severe AS, s/p TAVR on 4/24 c/b by VT -> shock -> VFib -> shock.  Course complicated w/ symptomatic bradycardia, requiring TVP, now s/p micra PPM. TAVR well seated on recent TTE without regurgitation  - continue aspirin 81mg daily.

## 2024-06-06 NOTE — PROGRESS NOTE ADULT - PROBLEM SELECTOR PLAN 9
TSH initially mildly elevated 4.79 - 5.06, FT4 1.3-1.5  TSH of 10.20 on 5/28, repeat T4 wnl  - c/w levothyroxine 75mcg daily as per endo   - Recheck TSH and FT4 outpatient

## 2024-06-06 NOTE — PROGRESS NOTE ADULT - ATTENDING COMMENTS
73F PMH: HFrEF (EF 30%), AS, LBBB, HTN, DM1, CKD, hypothyroidism, chronic lymphedema, LELIA (3L home O2) p/w for syncope 2/2 severe AS, s/p TAVR 4/24. Course complicated by contrast induced allergic reaction (had CTA for TAVR eval) and contrast related renal failure (acute on chronic) requiring HD. TAVR c/b VT and vfib requiring shock and flash pulmonary edema causing Acute hypoxic respiratory failure  requiring intubation.  Course complicated by contrast induced allergic reaction (had CTA for TAVR eval) and contrast related renal failure (acute on chronic) requiring CRRT for fluid removal and pressors for vasoplegia and hypovolemia due to CRRT. Patient extubated, now off pressors, CRRT.     1. Orthostatic hypotension- severe and persistent despite droxydopa, midodrine with florinef. Pyridostigmine started on 6/5. Seen w PT again today- see PT note. Orthostasis and symptoms improving. Monitor.     2. Urinary retention- failed 3 voiding trials.   3. Left UE DVT- c/w eliquis. Repeat US showing persistent DVT noted in the left subclavian and axillary veins. ACE wrap for swelling.   4. T1DM- uncontrolled with intermittent episodes of labile BS- . Being managed by endocrinology. Currently on Lantus and Admelog.

## 2024-06-06 NOTE — PROGRESS NOTE ADULT - SUBJECTIVE AND OBJECTIVE BOX
[facility]   [phone]  RENAL FOLLOW UP NOTE  --------------------------------------------------------------------------------  Chief Complaint:    24 hour events/subjective:    seen earlier   sitting in chair  states was able to stand up with help today   denies dysuria     PAST HISTORY  --------------------------------------------------------------------------------  No significant changes to PMH, PSH, FHx, SHx, unless otherwise noted    ALLERGIES & MEDICATIONS  --------------------------------------------------------------------------------  Allergies    contrast media (iodine-based) (Fever; Vomiting; Flushing; Hypotension; Rash; Stomach Upset)  Ativan (Rash; Urticaria)  penicillins (Hives (Mod to Severe); Anaphylaxis (Mod to Severe); Short breath (Mod to Severe); Angioedema (Mod to Severe))    Intolerances      Standing Inpatient Medications  ammonium lactate 12% Lotion 1 Application(s) Topical <User Schedule>  apixaban 5 milliGRAM(s) Oral two times a day  aspirin  chewable 81 milliGRAM(s) Oral daily  atorvastatin 80 milliGRAM(s) Oral at bedtime  calamine/zinc oxide Lotion 1 Application(s) Topical three times a day  dextrose 10% Bolus 125 milliLiter(s) IV Bolus once  dextrose 5%. 1000 milliLiter(s) IV Continuous <Continuous>  dextrose 5%. 1000 milliLiter(s) IV Continuous <Continuous>  dextrose 50% Injectable 25 Gram(s) IV Push once  dextrose 50% Injectable 12.5 Gram(s) IV Push once  droxidopa 600 milliGRAM(s) Oral three times a day  ferrous    sulfate 325 milliGRAM(s) Oral two times a day  fludroCORTISONE 0.2 milliGRAM(s) Oral daily  glucagon  Injectable 1 milliGRAM(s) IntraMuscular once  insulin glargine Injectable (LANTUS) 14 Unit(s) SubCutaneous at bedtime  insulin lispro (ADMELOG) corrective regimen sliding scale   SubCutaneous <User Schedule>  insulin lispro (ADMELOG) corrective regimen sliding scale   SubCutaneous three times a day before meals  insulin lispro Injectable (ADMELOG) 5 Unit(s) SubCutaneous three times a day before meals  levothyroxine 75 MICROGram(s) Oral daily  midodrine. 30 milliGRAM(s) Oral <User Schedule>  midodrine. 20 milliGRAM(s) Oral <User Schedule>  Nephro-molly 1 Tablet(s) Oral daily  pantoprazole    Tablet 40 milliGRAM(s) Oral every 24 hours  psyllium Powder 1 Packet(s) Oral two times a day  pyridostigmine 30 milliGRAM(s) Oral two times a day  triamcinolone 0.1% Ointment 1 Application(s) Topical two times a day    PRN Inpatient Medications  dextrose Oral Gel 15 Gram(s) Oral once PRN      REVIEW OF SYSTEMS  --------------------------------------------------------------------------------    Gen: denies  fevers/chills,  CVS: denies chest pain/palpitations  Resp: denies SOB/Cough  GI: Denies N/V/Abd pain  : Denies dysuria    VITALS/PHYSICAL EXAM  --------------------------------------------------------------------------------  T(C): 36.7 (06-06-24 @ 04:14), Max: 36.7 (06-06-24 @ 04:14)  HR: 77 (06-06-24 @ 11:39) (71 - 77)  BP: 134/70 (06-06-24 @ 11:39) (110/68 - 134/70)  RR: 18 (06-06-24 @ 04:14) (18 - 18)  SpO2: 96% (06-06-24 @ 11:39) (96% - 96%)  Wt(kg): --        06-05-24 @ 07:01  -  06-06-24 @ 07:00  --------------------------------------------------------  IN: 780 mL / OUT: 825 mL / NET: -45 mL    06-06-24 @ 07:01  -  06-06-24 @ 20:05  --------------------------------------------------------  IN: 360 mL / OUT: 800 mL / NET: -440 mL      Physical Exam:  	         Gen: comfortable appearing   	Pulm: decrease bs  no rales or ronchi or wheezing  	CV: No JVD. RRR, S1S2; no rub II/VI M   	Abd: +BS, soft, nontender/nondistended, obese   	UE: Warm, no cyanosis  no clubbing, no edema  	LE: Warm, no cyanosis  no clubbing, 2- edema, B/L ACE bandage  	Neuro: alert and oriented       LABS/STUDIES  --------------------------------------------------------------------------------                Creatinine Trend:  SCr 2.06 [06-03 @ 06:40]  SCr 1.77 [06-02 @ 07:05]  SCr 1.95 [06-01 @ 12:06]  SCr 2.50 [05-30 @ 06:51]  SCr 2.39 [05-29 @ 07:02]            PTH -- (Ca 8.4)      [04-19-24 @ 17:54]   109  TSH 10.20      [05-28-24 @ 06:42]  Lipid: chol 74, TG 70, HDL 33, LDL --      [04-13-24 @ 07:05]

## 2024-06-06 NOTE — PROGRESS NOTE ADULT - ASSESSMENT
73F w HFrEF (EF 30%), mod AS, known LBBB, HTN, IDDM1, CKD, hypothyroidism, chronic lymphedema, p/w 1 episode of syncope with R arm jerking.     #Syncope-Aortic Stenosis  - AS in setting of decreased LVEF  - s/p tavr on 4/24 c/b VT -> shock -> VFib -> shock  - hypoxic/congested, and was intubated, extubated on 4/25  - on 4/25 with worsening bradycardia, s/p TVP placement followed by AV Micra placement   - Remains in Sinus Rhythm/known lbbb with intermittent   - on high dose midodrine, droxidopa and florinef in the setting of orthostasis.  - TTE on 5/21 with global LV dysfunction severely decreased. Well seated TAVR, limited echo 6/4 with unchanged mod lv and no sig changes overall  - to consider CRT-D in the future  - Cannot initiate GDMT due to orthostasis  - would try to mobilize as best as possible and monitor orthostatics, hopefully standing bp can be kept >100.   - Still orthostatic, down to 80's yesterday  - cont asa and statin  - Hgb stable     #Chronic Systolic HF (EF 30%), mod to severe AS, HTN, LBBB, Lymphedema   - Has known systolic HF and AS.    - Repeat TTE showed EF 30%, mild-mod LVH, mildly reduced RVF, mod-severe AS (.61 cmsq), mod pHTN  - On home Torsemide 20 mg daily, Eplerenone 25 mg daily, and Toprol  mg daily, all currently on hold  - On Midodrine 20mg TID for orthostatic hypotension  - Droxidopa now initiated as well for significant orthostasis upto 600mg TID  - Unable to initiate GDMT at this time due to significant orthostatic hypotension  - initially CVVHD, then HD  - HD now on hold, as she is urinating and creatinine remains stable,   - Creatinine stable  - prn iv bumex  - renal fu noted  - Diagnosed with UE DVT and now on full loading dose of Eliquis  - PT and Bed to chair as much as possible    #Orthostatic hypotension.   - Likely in setting of deconditioning.   - would attempt orthostatics daily.  Hoping to wean off midodrine in order to restart GDMT. Orthostatics improving from supine to seated, but still problematic with standing. Remains orthostatic and symptomatic on standing  - Repeat othostatics-improved-Florinef decreased 2/2 increased edema, midodrine increased to 20mg TID as still symptomatic with standing    # Atrial Fibrillation  - Reported episode over the weekend  - Is on AC for DVT  - Will need outpatient monitoring  - Continue telemetry    #UTI  - Abx as per ID

## 2024-06-06 NOTE — PROGRESS NOTE ADULT - PROBLEM SELECTOR PLAN 1
Likely in setting of deconditioning.  Improving with fluids.  Will attempt orthostatics daily.  Hoping to wean off midodrine in order to restart GDMT.  Orthostatics improving from supine to seated, but still problematic with standing.  Remains orthostatic and symptomatic on standing  - leg compression, will attempt abdominal binder however difficult due to body habitus   - OOB as tolerated   - midodrine TID (30mg, 30mg, 20mg).  - continue droxydopa 600mg TID  - Continue fludrocortisone 0.2mg daily  - Continue to evaluate standing orthostatics daily  - will start pyridostigmine 30mg bid Likely in setting of deconditioning.  Improving with fluids.  Will attempt orthostatics daily.  Hoping to wean off midodrine in order to restart GDMT.  Orthostatics improving from supine to seated, but still problematic with standing.  Remains orthostatic and symptomatic on standing  - leg compression, will attempt abdominal binder however difficult due to body habitus   - OOB as tolerated   - midodrine TID (30mg, 30mg, 20mg).  - continue droxydopa 600mg TID  - Continue fludrocortisone 0.2mg daily  - Continue to evaluate standing orthostatics daily  - continue pyridostigmine 30mg bid

## 2024-06-06 NOTE — PROGRESS NOTE ADULT - ASSESSMENT
73F w HFrEF (EF 30%), mod AS, known LBBB, HTN, IDDM1, CKD, hypothyroidism, chronic lymphedema, suspected OHS/LELIA on 3L NC home O2 p/w 1 episode of syncope. Recent admission at [facility] (3/29-4/5) for ADHF and DUNCAN s/p diuresis, discharged with new home O2 3L NC suspecting OHS/LELIA pending outpatient w/u. Since discharge a week ago, she has been staying at home with   Pt had sob walking to car. Once in car, she was still breathing heavily. Partner noticed pt was not responding to verbal stimuli for 10-15sec and witnessed R arm jerking. Pt gained consciousness quickly without postictal symptoms. Pt went to Cardiologist Dr. Nathan who recommended pt to come to ED. No fever, cp, abd pain, n/v. Leg swelling is improved from prior. Pt on torsemide 40mg which she has been taking. Pt was discharged on 3LNC which she is currently on. Patient reports she did not take Farxiga that she was discharged on as she was concerned about side effects.     In ED, patient was found afebrile, hemodynamically stable, breathing well on 3L NC. Initial labs notable for mild hyponatremia, DUNCAN on CKD, elevated proBNP and elevated pCO2 both improved from prior.  pt also noticed with abn creatinine. had severe AS had ct scan with IV contrast had contrast nephropathy and hypotension ---> CRRT required       1- CKD IV  2- CHF   3- lymphedema   4- severe AS  s/p tavr 4/24  5- hypotension orthostatic       creatinine is overall steady in this range as of 6.3  to have lytes and cr checked in am   her edema has been worsening, and bed weight has been rising  diuretics have not been restarted due to persistent orthostasis   orthostatic hypotension,   midodrine 30 mg am, and afternoon  midodrine 20 mg bedtime,  northera 600 mg TID   cont mestinone 30 mg 8 am and 1 pm   florinef 0.2mg decreased to daily due to LE edema with goal to taper further every 72 hr   continue to check orthostatic bp  also PT for conditioning   anemia, hb higher  now off epogen   check hb   non-oliguric,

## 2024-06-06 NOTE — PROGRESS NOTE ADULT - SUBJECTIVE AND OBJECTIVE BOX
[facility] - Howard Kimble, Carlos Luong, Herman Alston  Office Number:  [phone]    Patient resting comfortably in bed in NAD.  Laying flat with no respiratory distress.  No complaints of chest pain, dyspnea, palpitations, PND, or orthopnea.    ROS: negative unless otherwise mentioned.    Telemetry:  sr    MEDICATIONS  (STANDING):  ammonium lactate 12% Lotion 1 Application(s) Topical <User Schedule>  apixaban 5 milliGRAM(s) Oral two times a day  aspirin  chewable 81 milliGRAM(s) Oral daily  atorvastatin 80 milliGRAM(s) Oral at bedtime  calamine/zinc oxide Lotion 1 Application(s) Topical three times a day  dextrose 10% Bolus 125 milliLiter(s) IV Bolus once  dextrose 5%. 1000 milliLiter(s) (50 mL/Hr) IV Continuous <Continuous>  dextrose 5%. 1000 milliLiter(s) (100 mL/Hr) IV Continuous <Continuous>  dextrose 50% Injectable 25 Gram(s) IV Push once  dextrose 50% Injectable 12.5 Gram(s) IV Push once  droxidopa 600 milliGRAM(s) Oral three times a day  ferrous    sulfate 325 milliGRAM(s) Oral two times a day  fludroCORTISONE 0.2 milliGRAM(s) Oral daily  glucagon  Injectable 1 milliGRAM(s) IntraMuscular once  insulin glargine Injectable (LANTUS) 14 Unit(s) SubCutaneous at bedtime  insulin lispro (ADMELOG) corrective regimen sliding scale   SubCutaneous <User Schedule>  insulin lispro (ADMELOG) corrective regimen sliding scale   SubCutaneous three times a day before meals  insulin lispro Injectable (ADMELOG) 5 Unit(s) SubCutaneous three times a day before meals  levothyroxine 75 MICROGram(s) Oral daily  midodrine. 30 milliGRAM(s) Oral <User Schedule>  midodrine. 20 milliGRAM(s) Oral <User Schedule>  Nephro-molly 1 Tablet(s) Oral daily  pantoprazole    Tablet 40 milliGRAM(s) Oral every 24 hours  psyllium Powder 1 Packet(s) Oral two times a day  pyridostigmine 30 milliGRAM(s) Oral two times a day  triamcinolone 0.1% Ointment 1 Application(s) Topical two times a day    MEDICATIONS  (PRN):  dextrose Oral Gel 15 Gram(s) Oral once PRN Blood Glucose LESS THAN 70 milliGRAM(s)/deciliter      Allergies    contrast media (iodine-based) (Fever; Vomiting; Flushing; Hypotension; Rash; Stomach Upset)  Ativan (Rash; Urticaria)  penicillins (Hives (Mod to Severe); Anaphylaxis (Mod to Severe); Short breath (Mod to Severe); Angioedema (Mod to Severe))    Intolerances        Vital Signs Last 24 Hrs  T(C): 36.7 (06 Jun 2024 04:14), Max: 36.7 (06 Jun 2024 04:14)  T(F): 98.1 (06 Jun 2024 04:14), Max: 98.1 (06 Jun 2024 04:14)  HR: 76 (06 Jun 2024 04:14) (71 - 99)  BP: 131/66 (06 Jun 2024 04:14) (110/68 - 147/74)  BP(mean): --  RR: 18 (06 Jun 2024 04:14) (18 - 18)  SpO2: 96% (06 Jun 2024 04:14) (94% - 97%)    Parameters below as of 06 Jun 2024 04:14  Patient On (Oxygen Delivery Method): room air        I&O's Summary    05 Jun 2024 07:01  -  06 Jun 2024 07:00  --------------------------------------------------------  IN: 780 mL / OUT: 825 mL / NET: -45 mL        ON EXAM:    Constitutional: obese, NAD   HEENT:  MMM, sclerae anicteric, conjunctivae clear, no oral cyanosis.  Pulmonary: Non-labored, breath sounds are clear bilaterally, No wheezing, rales or rhonchi  Cardiovascular: Regular, S1 and S2.  No murmur.  No rubs, gallops or clicks  Gastrointestinal: Bowel Sounds present, soft, nontender.   Lymph: No obv peripheral edema. legs wrapped with ace bandages  Neurological: Alert, no focal deficits  Skin: No rashes.  Psych:  Mood & affect appropriate    LABS: All Labs Reviewed:                Blood Culture:

## 2024-06-07 NOTE — PROGRESS NOTE ADULT - PROBLEM SELECTOR PLAN 1
- Check BG TID AC, HS, and 2AM  - C/w Lantus 14 units HS  - C/w Admelog 5 units TID AC. Patient feels that 5units works for her when she is eating. If she is not eating she will refuse the dose.    - C/w Low dose Admelog correction scale TID AC, Low dose Admelog correction scale QHS and 2am in case of rebound hypo/hyperglycemia    Discharge:  - Will be determined according to BG/insulin needs/PO intake  at time of discharge; basal/bolus insulin doses TBD  - Of note, patient instructed on discharge from recent hospitalization at [place] to start Farxiga for CHF. Given risks of DKA of SGLT2i in T1DM, would not start until better data is available for its benefits.  - Follow up with Dr. Luis Dumont outpatient  - Patient will likely required rehab  - F/u with optho/renal/cardiac as outpatient - Check BG TID AC, HS, and 2AM  - C/w Lantus 14 units HS  - C/w Admelog 5 units TID AC  - C/w Low dose Admelog correction scale TID AC, low dose Admelog correction scale QHS and 2am in case of rebound hypo/hyperglycemia    Discharge:  - Will be determined according to BG/insulin needs/PO intake  at time of discharge; basal/bolus insulin doses TBD  - Of note, patient instructed on discharge from recent hospitalization at [place] to start Farxiga for CHF. Given risks of DKA of SGLT2i in T1DM, would not start until better data is available for its benefits.  - Follow up with Dr. Luis Dumont outpatient  - Patient will likely required rehab  - F/u with optho/renal/cardiac as outpatient

## 2024-06-07 NOTE — PROGRESS NOTE ADULT - ATTENDING COMMENTS
73F PMH: HFrEF (EF 30%), AS, LBBB, HTN, DM1, CKD, hypothyroidism, chronic lymphedema, LELIA (3L home O2) p/w for syncope 2/2 severe AS, s/p TAVR 4/24. Course complicated by contrast induced allergic reaction (had CTA for TAVR eval) and contrast related renal failure (acute on chronic) requiring HD. TAVR c/b VT and vfib requiring shock and flash pulmonary edema causing Acute hypoxic respiratory failure  requiring intubation.  Course complicated by contrast induced allergic reaction (had CTA for TAVR eval) and contrast related renal failure (acute on chronic) requiring CRRT for fluid removal and pressors for vasoplegia and hypovolemia due to CRRT. Patient extubated, now off pressors, CRRT.     1. Orthostatic hypotension- severe and persistent despite droxydopa, midodrine with florinef. Pyridostigmine started on 6/5. Orthostasis and symptoms improving, dose increased today.     2. Urinary retention- failed 3 voiding trials.   3. Left UE DVT- c/w eliquis. Repeat US showing persistent DVT noted in the left subclavian and axillary veins. ACE wrap for swelling.   4. T1DM- uncontrolled with intermittent episodes of labile BS. Being managed by endocrinology. Currently on Lantus and Admelog.

## 2024-06-07 NOTE — PROGRESS NOTE ADULT - ASSESSMENT
73F w/h/o of T1DM with unknown control due to CKD and anemia of chronic disease. DM c/b CKD. Also h/o HFrEF (EF 30%), mod AS, known LBBB, HTN,  hypothyroidism, chronic lymphedema, suspected OHS/LELIA on 3L NC home O2 p/w 1 episode of syncope with R arm jerking, likely 2/2 severe symptomatic AS. S/p AVR 4/24 c/b DUNCAN on CKD, fever and hypotension. Also UTI/pul edema/ sepsis and L adrenal nodule finding. Also orthostatic hypotension > now on Florinef. BG levels variable due to variable PO intake. Patient also refusing insulin on and off. Will continue with current regimen given better control of BG levels, patient eating well, and patient preference for no changes to insulin regimen. Most BG are noted to be at goal 100-180mg/dL, near hypoglycemia around lunch to 79mg/dL noted. Team will closely monitor BG levels.    Per endocrine attending Dr Burton> Adrenal nodule work up completed and pt will need to f/u once she is more stable as out pt.     Home regimen: Lantus 33 units qhs, Novolog based on sliding scale TIDAC normally 3-5 units  Has Dexcom G7                 73F w/h/o of T1DM with unknown control due to CKD and anemia of chronic disease. DM c/b CKD. Also h/o HFrEF (EF 30%), mod AS, known LBBB, HTN,  hypothyroidism, chronic lymphedema, suspected OHS/LELIA on 3L NC home O2 p/w 1 episode of syncope with R arm jerking, likely 2/2 severe symptomatic AS. S/p AVR 4/24 c/b DUNCAN on CKD, fever and hypotension. Also UTI/pul edema/ sepsis and L adrenal nodule finding. Also orthostatic hypotension > now on Florinef. BG levels variable due to variable PO intake. Patient also refusing insulin on and off. Will continue with current regimen given better control of BG levels, patient eating well, and patient preference for no changes to insulin regimen. BG levels are noted to be at goal 100-180mg/dL, no hypoglycemia or hyperglycemia noted in the last 48hs. Team will closely monitor BG levels.    Per endocrine attending Dr Burton> Adrenal nodule work up completed and pt will need to f/u once she is more stable as out pt.     Home regimen: Lantus 33 units qhs, Novolog based on sliding scale TIDAC normally 3-5 units  Has Dexcom G7

## 2024-06-07 NOTE — PROGRESS NOTE ADULT - SUBJECTIVE AND OBJECTIVE BOX
[facility]   [phone]  RENAL FOLLOW UP NOTE  --------------------------------------------------------------------------------  Chief Complaint:    24 hour events/subjective:    patient seen and examined.   denies sob      PAST HISTORY  --------------------------------------------------------------------------------  No significant changes to PMH, PSH, FHx, SHx, unless otherwise noted    ALLERGIES & MEDICATIONS  --------------------------------------------------------------------------------  Allergies    contrast media (iodine-based) (Fever; Vomiting; Flushing; Hypotension; Rash; Stomach Upset)  Ativan (Rash; Urticaria)  penicillins (Hives (Mod to Severe); Anaphylaxis (Mod to Severe); Short breath (Mod to Severe); Angioedema (Mod to Severe))    Intolerances      Standing Inpatient Medications  ammonium lactate 12% Lotion 1 Application(s) Topical <User Schedule>  apixaban 5 milliGRAM(s) Oral two times a day  aspirin  chewable 81 milliGRAM(s) Oral daily  atorvastatin 80 milliGRAM(s) Oral at bedtime  calamine/zinc oxide Lotion 1 Application(s) Topical three times a day  dextrose 10% Bolus 125 milliLiter(s) IV Bolus once  dextrose 5%. 1000 milliLiter(s) IV Continuous <Continuous>  dextrose 5%. 1000 milliLiter(s) IV Continuous <Continuous>  dextrose 50% Injectable 25 Gram(s) IV Push once  dextrose 50% Injectable 12.5 Gram(s) IV Push once  droxidopa 600 milliGRAM(s) Oral three times a day  ferrous    sulfate 325 milliGRAM(s) Oral two times a day  fludroCORTISONE 0.2 milliGRAM(s) Oral daily  glucagon  Injectable 1 milliGRAM(s) IntraMuscular once  insulin glargine Injectable (LANTUS) 14 Unit(s) SubCutaneous at bedtime  insulin lispro (ADMELOG) corrective regimen sliding scale   SubCutaneous three times a day before meals  insulin lispro (ADMELOG) corrective regimen sliding scale   SubCutaneous <User Schedule>  insulin lispro Injectable (ADMELOG) 5 Unit(s) SubCutaneous three times a day before meals  levothyroxine 75 MICROGram(s) Oral daily  midodrine. 30 milliGRAM(s) Oral <User Schedule>  midodrine. 20 milliGRAM(s) Oral <User Schedule>  Nephro-molly 1 Tablet(s) Oral daily  pantoprazole    Tablet 40 milliGRAM(s) Oral every 24 hours  psyllium Powder 1 Packet(s) Oral two times a day  pyridostigmine 30 milliGRAM(s) Oral once  pyridostigmine 30 milliGRAM(s) Oral once  triamcinolone 0.1% Ointment 1 Application(s) Topical two times a day    PRN Inpatient Medications  dextrose Oral Gel 15 Gram(s) Oral once PRN      REVIEW OF SYSTEMS  --------------------------------------------------------------------------------    Gen: denies fevers/chills,  CVS: denies chest pain/palpitations  Resp: denies SOB/Cough  GI: Denies N/V/Abd pain  : Denies dysuria/oliguria/hematuria    VITALS/PHYSICAL EXAM  --------------------------------------------------------------------------------  T(C): 36.9 (06-07-24 @ 04:08), Max: 36.9 (06-07-24 @ 04:08)  HR: 75 (06-07-24 @ 11:38) (65 - 75)  BP: 151/69 (06-07-24 @ 11:38) (133/65 - 151/69)  RR: 18 (06-07-24 @ 04:08) (18 - 18)  SpO2: 96% (06-07-24 @ 11:38) (95% - 96%)  Wt(kg): --        06-06-24 @ 07:01  -  06-07-24 @ 07:00  --------------------------------------------------------  IN: 780 mL / OUT: 1425 mL / NET: -645 mL    06-07-24 @ 07:01  -  06-07-24 @ 12:23  --------------------------------------------------------  IN: 180 mL / OUT: 750 mL / NET: -570 mL      Physical Exam:  	              Gen: comfortable appearing   	Pulm: decrease bs  no rales or ronchi or wheezing  	CV: No JVD. RRR, S1S2; no rub II/VI M   	Abd: +BS, soft, nontender/nondistended, obese   	UE: Warm, no cyanosis  no clubbing, no edema  	LE: Warm, no cyanosis  no clubbing, 2- edema, B/L ACE bandage  	Neuro: alert and oriented     LABS/STUDIES  --------------------------------------------------------------------------------              10.5   7.12  >-----------<  166      [06-07-24 @ 07:19]              34.1     139  |  107  |  24  ----------------------------<  127      [06-07-24 @ 07:19]  3.9   |  20  |  1.61        Ca     8.4     [06-07-24 @ 07:19]      Mg     2.0     [06-07-24 @ 07:19]      Phos  3.3     [06-07-24 @ 07:19]    TPro  5.6  /  Alb  2.3  /  TBili  0.3  /  DBili  x   /  AST  20  /  ALT  8   /  AlkPhos  91  [06-07-24 @ 07:19]          Creatinine Trend:  SCr 1.61 [06-07 @ 07:19]  SCr 2.06 [06-03 @ 06:40]  SCr 1.77 [06-02 @ 07:05]  SCr 1.95 [06-01 @ 12:06]  SCr 2.50 [05-30 @ 06:51]                PTH -- (Ca 8.4)      [04-19-24 @ 17:54]   109  TSH 10.20      [05-28-24 @ 06:42]  Lipid: chol 74, TG 70, HDL 33, LDL --      [04-13-24 @ 07:05]

## 2024-06-07 NOTE — PROGRESS NOTE ADULT - PROBLEM SELECTOR PLAN 7
EF from 4/2024 (post-TAVR) w/ EF 25%, global LV hypokinesis, moderately dilated LV, grade 2 LV diastolic dysfunction, RA moderately dilated.  Repeat TTE with reduced LV function.  TAVR remains well seated  - cards following, will f/u with cardiology regarding resuming GDMT  - per cards, overnight telemetry 6/1 consistent with afib, recommending to continue monitoring on tele  - continue midodrine 15mg TID   - Continue atorvastatin 80mg daily  - hold home torsemide 20mg qd, hold home eplerenone 25mg qd, hold home metoprolol succinate 100mg qd  - Repeat TTE 6/4 predominantly unchanged   - f/u w/ EP outpatient for CRT.    #Aortic Stenosis  Presented for syncope secondary to severe AS, s/p TAVR on 4/24 c/b by VT -> shock -> VFib -> shock.  Course complicated w/ symptomatic bradycardia, requiring TVP, now s/p micra PPM. TAVR well seated on recent TTE without regurgitation  - continue aspirin 81mg daily.

## 2024-06-07 NOTE — PROGRESS NOTE ADULT - ASSESSMENT
73F w HFrEF (EF 30%), mod AS, known LBBB, HTN, IDDM1, CKD, hypothyroidism, chronic lymphedema, p/w 1 episode of syncope with R arm jerking.     #Syncope-Aortic Stenosis  - AS in setting of decreased LVEF  - s/p tavr on 4/24 c/b VT -> shock -> VFib -> shock  - hypoxic/congested, and was intubated, extubated on 4/25  - on 4/25 with worsening bradycardia, s/p TVP placement followed by AV Micra placement   - Remains in Sinus Rhythm/known lbbb with intermittent   - on high dose midodrine, droxidopa and florinef in the setting of orthostasis.  - TTE on 5/21 with global LV dysfunction severely decreased. Well seated TAVR, limited echo 6/4 with unchanged mod lv and no sig changes overall  - to consider CRT-D in the future  - Cannot initiate GDMT due to orthostasis  - would try to mobilize as best as possible and monitor orthostatics, hopefully standing bp can be kept >100.   - Still orthostatic, down to 80's yesterday  - cont asa and statin  - Hgb stable     #Chronic Systolic HF (EF 30%), mod to severe AS, HTN, LBBB, Lymphedema   - Has known systolic HF and AS.    - Repeat TTE showed EF 30%, mild-mod LVH, mildly reduced RVF, mod-severe AS (.61 cmsq), mod pHTN  - Repeat limited study with normal function TAVR  - On home Torsemide 20 mg daily, Eplerenone 25 mg daily, and Toprol  mg daily, all currently on hold  - On Midodrine 20mg TID for orthostatic hypotension  - Droxidopa now initiated as well for significant orthostasis upto 600mg TID  - Unable to initiate GDMT at this time due to significant orthostatic hypotension  - initially CVVHD, then HD  - HD now on hold, as she is urinating and creatinine remains stable,   - Creatinine improving  - prn iv bumex  - renal fu noted  - Diagnosed with UE DVT and now on full loading dose of Eliquis  - PT and Bed to chair as much as possible    #Orthostatic hypotension.   - Likely in setting of deconditioning.   - would attempt orthostatics daily.  Hoping to wean off midodrine in order to restart GDMT. Orthostatics improving from supine to seated, but still problematic with standing. Remains orthostatic and symptomatic on standing  - Repeat othostatics-improved-Florinef decreased 2/2 increased edema, midodrine increased to 20mg TID as still symptomatic with standing  - Overall symptoms are markedly improving. she sat in thee chair x 6 hrs and stood 3 times on 6/7    # Atrial Fibrillation  - Reported episode over the weekend (6/1-6/2)  - Is on AC for DVT  - Will need outpatient monitoring  - Continue telemetry    #UTI  - Abx as per ID

## 2024-06-07 NOTE — PROVIDER CONTACT NOTE (OTHER) - ASSESSMENT
Pt asymptomatic. VSS.  Pt understands that this has happened before and lantus dose has previously been adjusted if BG is <100.

## 2024-06-07 NOTE — PROGRESS NOTE ADULT - SUBJECTIVE AND OBJECTIVE BOX
Patient seen today for follow up inpatient Diabetes Mellitus management.    Chief Complaint: Type 2 Diabetes Mellitus     INTERVAL HX:  Patient seen in [facility] 343 W1. Patient is alert and oriented, resting/sitting up in bed. BG have been stable and mostly at goal 100-180mg/dL while on a Consistent Carbohydrate Diet. Patient has been tolerating PO diet well. Blood glucose levels in the last 24hrs have been 98-145mg/dL. Patient knows to hold pre-meal insulin if not eating meal or eating <50% of meal.     Review of Systems:  General: As above.  Respiratory: Denies any SOB, CURTIS, or cough.  Gastrointestinal: Denies any n/v/d or abdominal pain.   Endocrine: Denies any polyuria, polydpsia, polyphagia, visual changes, or numbness in feet.     Allergies  contrast media (iodine-based) (Fever; Vomiting; Flushing; Hypotension; Rash; Stomach Upset)  Ativan (Rash; Urticaria)  penicillins (Hives (Mod to Severe); Anaphylaxis (Mod to Severe); Short breath (Mod to Severe); Angioedema (Mod to Severe))    Intolerances  None.     MEDICATIONS  (STANDING):  ammonium lactate 12% Lotion 1 Application(s) Topical <User Schedule>  apixaban 5 milliGRAM(s) Oral two times a day  aspirin  chewable 81 milliGRAM(s) Oral daily  atorvastatin 80 milliGRAM(s) Oral at bedtime  calamine/zinc oxide Lotion 1 Application(s) Topical three times a day  dextrose 10% Bolus 125 milliLiter(s) IV Bolus once  dextrose 5%. 1000 milliLiter(s) IV Continuous <Continuous>  dextrose 5%. 1000 milliLiter(s) IV Continuous <Continuous>  dextrose 50% Injectable 25 Gram(s) IV Push once  dextrose 50% Injectable 12.5 Gram(s) IV Push once  dextrose Oral Gel 15 Gram(s) Oral once PRN  droxidopa 600 milliGRAM(s) Oral three times a day  ferrous    sulfate 325 milliGRAM(s) Oral two times a day  glucagon  Injectable 1 milliGRAM(s) IntraMuscular once  insulin glargine Injectable (LANTUS) 14 Unit(s) SubCutaneous at bedtime  insulin lispro (ADMELOG) corrective regimen sliding scale   SubCutaneous three times a day before meals  insulin lispro (ADMELOG) corrective regimen sliding scale   SubCutaneous <User Schedule>  insulin lispro Injectable (ADMELOG) 5 Unit(s) SubCutaneous three times a day before meals  levothyroxine 75 MICROGram(s) Oral daily  midodrine. 20 milliGRAM(s) Oral <User Schedule>  midodrine. 30 milliGRAM(s) Oral <User Schedule>  Nephro-molly 1 Tablet(s) Oral daily  pantoprazole    Tablet 40 milliGRAM(s) Oral every 24 hours  psyllium Powder 1 Packet(s) Oral two times a day  pyridostigmine 30 milliGRAM(s) Oral once  pyridostigmine 30 milliGRAM(s) Oral once  triamcinolone 0.1% Ointment 1 Application(s) Topical two times a day      atorvastatin 80 milliGRAM(s) Oral at bedtime  dextrose 50% Injectable 25 Gram(s) IV Push once  dextrose 50% Injectable 12.5 Gram(s) IV Push once  dextrose Oral Gel 15 Gram(s) Oral once PRN  glucagon  Injectable 1 milliGRAM(s) IntraMuscular once  insulin glargine Injectable (LANTUS) 14 Unit(s) SubCutaneous at bedtime  insulin lispro (ADMELOG) corrective regimen sliding scale   SubCutaneous <User Schedule>  insulin lispro (ADMELOG) corrective regimen sliding scale   SubCutaneous three times a day before meals  insulin lispro Injectable (ADMELOG) 5 Unit(s) SubCutaneous three times a day before meals  levothyroxine 75 MICROGram(s) Oral daily      insulin lispro (ADMELOG) corrective regimen sliding scale   SubCutaneous <User Schedule>  insulin lispro (ADMELOG) corrective regimen sliding scale   SubCutaneous three times a day before meals  insulin lispro Injectable (ADMELOG) 5 Unit(s) SubCutaneous three times a day before meals      PHYSICAL EXAM:  VITALS:   T(C): 36.9 (06-07-24 @ 04:08), Max: 36.9 (06-07-24 @ 04:08)  HR: 75 (06-07-24 @ 11:38) (65 - 75)  BP: 151/69 (06-07-24 @ 11:38) (133/65 - 151/69)  RR: 18 (06-07-24 @ 04:08) (18 - 18)  SpO2: 96% (06-07-24 @ 11:38) (95% - 96%)    GENERAL: In no acute distress  Respiratory: Respirations unlabored  Extremities: Warm and dry, BLE edema legs wrapped with ACE wrap  NEURO: Alert and oriented, appropriate     LABS:  POCT Blood Glucose.: 86 mg/dL (06-07-24 @ 12:42)  POCT Blood Glucose.: 116 mg/dL (06-07-24 @ 08:40)  POCT Blood Glucose.: 145 mg/dL (06-07-24 @ 02:25)  POCT Blood Glucose.: 133 mg/dL (06-06-24 @ 21:44)  POCT Blood Glucose.: 107 mg/dL (06-06-24 @ 17:09)  POCT Blood Glucose.: 119 mg/dL (06-06-24 @ 12:29)  POCT Blood Glucose.: 98 mg/dL (06-06-24 @ 08:43)  POCT Blood Glucose.: 112 mg/dL (06-06-24 @ 02:43)  POCT Blood Glucose.: 111 mg/dL (06-05-24 @ 22:53)  POCT Blood Glucose.: 103 mg/dL (06-05-24 @ 21:50)  POCT Blood Glucose.: 170 mg/dL (06-05-24 @ 17:14)  POCT Blood Glucose.: 100 mg/dL (06-05-24 @ 12:54)  POCT Blood Glucose.: 133 mg/dL (06-05-24 @ 08:39)  POCT Blood Glucose.: 160 mg/dL (06-05-24 @ 02:22)  POCT Blood Glucose.: 136 mg/dL (06-04-24 @ 21:35)  POCT Blood Glucose.: 164 mg/dL (06-04-24 @ 17:36)                          10.5   7.12  )-----------( 166      ( 07 Jun 2024 07:19 )             34.1     06-07    139  |  107  |  24<H>  ----------------------------<  127<H>  3.9   |  20<L>  |  1.61<H>    Ca    8.4      07 Jun 2024 07:19  Phos  3.3     06-07  Mg     2.0     06-07    TPro  5.6<L>  /  Alb  2.3<L>  /  TBili  0.3  /  DBili  x   /  AST  20  /  ALT  8<L>  /  AlkPhos  91  06-07    LIVER FUNCTIONS - ( 07 Jun 2024 07:19 )  Alb: 2.3 g/dL / Pro: 5.6 g/dL / ALK PHOS: 91 U/L / ALT: 8 U/L / AST: 20 U/L / GGT: x             Urinalysis Basic - ( 07 Jun 2024 07:19 )    Color: x / Appearance: x / SG: x / pH: x  Gluc: 127 mg/dL / Ketone: x  / Bili: x / Urobili: x   Blood: x / Protein: x / Nitrite: x   Leuk Esterase: x / RBC: x / WBC x   Sq Epi: x / Non Sq Epi: x / Bacteria: x    A1C with Estimated Average Glucose Result: A1C with Estimated Average Glucose Result: 7.4 % (03-30-24 @ 08:21)  A1C with Estimated Average Glucose Result: 6.8 % (01-10-24 @ 08:37) Patient seen today for follow up inpatient Diabetes Mellitus management.    Chief Complaint: Type 2 Diabetes Mellitus     INTERVAL HX:  Patient seen in [facility] 343 W1. Patient is alert and oriented, resting/sitting up in bed. BG have been stable and mostly at goal 100-180mg/dL while on a Consistent Carbohydrate Diet. Patient has been tolerating PO diet well. Blood glucose levels in the last 24hrs have been 98-145mg/dL. Patient knows to hold pre-meal insulin if not eating meal or eating <50% of meal.     Review of Systems:  General: As above.  Respiratory: Denies any SOB, CURTIS, or cough.  Gastrointestinal: Denies any n/v/d or abdominal pain.   Endocrine: Denies any polyuria, polyopsia polyphagia, visual changes, or numbness in feet.     Allergies  contrast media (iodine-based) (Fever; Vomiting; Flushing; Hypotension; Rash; Stomach Upset)  Ativan (Rash; Urticaria)  penicillins (Hives (Mod to Severe); Anaphylaxis (Mod to Severe); Short breath (Mod to Severe); Angioedema (Mod to Severe))    Intolerances  None.     MEDICATIONS  (STANDING):  ammonium lactate 12% Lotion 1 Application(s) Topical <User Schedule>  apixaban 5 milliGRAM(s) Oral two times a day  aspirin  chewable 81 milliGRAM(s) Oral daily  atorvastatin 80 milliGRAM(s) Oral at bedtime  calamine/zinc oxide Lotion 1 Application(s) Topical three times a day  dextrose 10% Bolus 125 milliLiter(s) IV Bolus once  dextrose 5%. 1000 milliLiter(s) IV Continuous <Continuous>  dextrose 5%. 1000 milliLiter(s) IV Continuous <Continuous>  dextrose 50% Injectable 25 Gram(s) IV Push once  dextrose 50% Injectable 12.5 Gram(s) IV Push once  dextrose Oral Gel 15 Gram(s) Oral once PRN  droxidopa 600 milliGRAM(s) Oral three times a day  ferrous    sulfate 325 milliGRAM(s) Oral two times a day  glucagon  Injectable 1 milliGRAM(s) IntraMuscular once  insulin glargine Injectable (LANTUS) 14 Unit(s) SubCutaneous at bedtime  insulin lispro (ADMELOG) corrective regimen sliding scale   SubCutaneous three times a day before meals  insulin lispro (ADMELOG) corrective regimen sliding scale   SubCutaneous <User Schedule>  insulin lispro Injectable (ADMELOG) 5 Unit(s) SubCutaneous three times a day before meals  levothyroxine 75 MICROGram(s) Oral daily  midodrine. 20 milliGRAM(s) Oral <User Schedule>  midodrine. 30 milliGRAM(s) Oral <User Schedule>  Nephro-molly 1 Tablet(s) Oral daily  pantoprazole    Tablet 40 milliGRAM(s) Oral every 24 hours  psyllium Powder 1 Packet(s) Oral two times a day  pyridostigmine 30 milliGRAM(s) Oral once  pyridostigmine 30 milliGRAM(s) Oral once  triamcinolone 0.1% Ointment 1 Application(s) Topical two times a day      atorvastatin 80 milliGRAM(s) Oral at bedtime  dextrose 50% Injectable 25 Gram(s) IV Push once  dextrose 50% Injectable 12.5 Gram(s) IV Push once  dextrose Oral Gel 15 Gram(s) Oral once PRN  glucagon  Injectable 1 milliGRAM(s) IntraMuscular once  insulin glargine Injectable (LANTUS) 14 Unit(s) SubCutaneous at bedtime  insulin lispro (ADMELOG) corrective regimen sliding scale   SubCutaneous <User Schedule>  insulin lispro (ADMELOG) corrective regimen sliding scale   SubCutaneous three times a day before meals  insulin lispro Injectable (ADMELOG) 5 Unit(s) SubCutaneous three times a day before meals  levothyroxine 75 MICROGram(s) Oral daily      insulin lispro (ADMELOG) corrective regimen sliding scale   SubCutaneous <User Schedule>  insulin lispro (ADMELOG) corrective regimen sliding scale   SubCutaneous three times a day before meals  insulin lispro Injectable (ADMELOG) 5 Unit(s) SubCutaneous three times a day before meals      PHYSICAL EXAM:  VITALS:   T(C): 36.9 (06-07-24 @ 04:08), Max: 36.9 (06-07-24 @ 04:08)  HR: 75 (06-07-24 @ 11:38) (65 - 75)  BP: 151/69 (06-07-24 @ 11:38) (133/65 - 151/69)  RR: 18 (06-07-24 @ 04:08) (18 - 18)  SpO2: 96% (06-07-24 @ 11:38) (95% - 96%)    GENERAL: In no acute distress  Respiratory: Respirations unlabored  Extremities: Warm and dry, BLE edema legs wrapped with ACE wrap  NEURO: Alert and oriented, appropriate     LABS:  POCT Blood Glucose.: 86 mg/dL (06-07-24 @ 12:42)  POCT Blood Glucose.: 116 mg/dL (06-07-24 @ 08:40)  POCT Blood Glucose.: 145 mg/dL (06-07-24 @ 02:25)  POCT Blood Glucose.: 133 mg/dL (06-06-24 @ 21:44)  POCT Blood Glucose.: 107 mg/dL (06-06-24 @ 17:09)  POCT Blood Glucose.: 119 mg/dL (06-06-24 @ 12:29)  POCT Blood Glucose.: 98 mg/dL (06-06-24 @ 08:43)  POCT Blood Glucose.: 112 mg/dL (06-06-24 @ 02:43)  POCT Blood Glucose.: 111 mg/dL (06-05-24 @ 22:53)  POCT Blood Glucose.: 103 mg/dL (06-05-24 @ 21:50)  POCT Blood Glucose.: 170 mg/dL (06-05-24 @ 17:14)  POCT Blood Glucose.: 100 mg/dL (06-05-24 @ 12:54)  POCT Blood Glucose.: 133 mg/dL (06-05-24 @ 08:39)  POCT Blood Glucose.: 160 mg/dL (06-05-24 @ 02:22)  POCT Blood Glucose.: 136 mg/dL (06-04-24 @ 21:35)  POCT Blood Glucose.: 164 mg/dL (06-04-24 @ 17:36)                          10.5   7.12  )-----------( 166      ( 07 Jun 2024 07:19 )             34.1     06-07    139  |  107  |  24<H>  ----------------------------<  127<H>  3.9   |  20<L>  |  1.61<H>    Ca    8.4      07 Jun 2024 07:19  Phos  3.3     06-07  Mg     2.0     06-07    TPro  5.6<L>  /  Alb  2.3<L>  /  TBili  0.3  /  DBili  x   /  AST  20  /  ALT  8<L>  /  AlkPhos  91  06-07    LIVER FUNCTIONS - ( 07 Jun 2024 07:19 )  Alb: 2.3 g/dL / Pro: 5.6 g/dL / ALK PHOS: 91 U/L / ALT: 8 U/L / AST: 20 U/L / GGT: x             Urinalysis Basic - ( 07 Jun 2024 07:19 )    Color: x / Appearance: x / SG: x / pH: x  Gluc: 127 mg/dL / Ketone: x  / Bili: x / Urobili: x   Blood: x / Protein: x / Nitrite: x   Leuk Esterase: x / RBC: x / WBC x   Sq Epi: x / Non Sq Epi: x / Bacteria: x    A1C with Estimated Average Glucose Result: A1C with Estimated Average Glucose Result: 7.4 % (03-30-24 @ 08:21)  A1C with Estimated Average Glucose Result: 6.8 % (01-10-24 @ 08:37)

## 2024-06-07 NOTE — PROGRESS NOTE ADULT - SUBJECTIVE AND OBJECTIVE BOX
[facility] - Howard Kimble, Carlos Luong, Herman Alston  Office Number:  [phone]    Patient resting comfortably in bed in NAD.  Laying flat with no respiratory distress.  No complaints of chest pain, dyspnea, palpitations, PND, or orthopnea.  She sat in the chair x 6 hours yesterday  Stood 3 times as well, felt shaky    ROS: negative unless otherwise mentioned.    Telemetry: SR    MEDICATIONS  (STANDING):  ammonium lactate 12% Lotion 1 Application(s) Topical <User Schedule>  apixaban 5 milliGRAM(s) Oral two times a day  aspirin  chewable 81 milliGRAM(s) Oral daily  atorvastatin 80 milliGRAM(s) Oral at bedtime  calamine/zinc oxide Lotion 1 Application(s) Topical three times a day  dextrose 10% Bolus 125 milliLiter(s) IV Bolus once  dextrose 5%. 1000 milliLiter(s) (50 mL/Hr) IV Continuous <Continuous>  dextrose 5%. 1000 milliLiter(s) (100 mL/Hr) IV Continuous <Continuous>  dextrose 50% Injectable 25 Gram(s) IV Push once  dextrose 50% Injectable 12.5 Gram(s) IV Push once  droxidopa 600 milliGRAM(s) Oral three times a day  ferrous    sulfate 325 milliGRAM(s) Oral two times a day  fludroCORTISONE 0.2 milliGRAM(s) Oral daily  glucagon  Injectable 1 milliGRAM(s) IntraMuscular once  insulin glargine Injectable (LANTUS) 14 Unit(s) SubCutaneous at bedtime  insulin lispro (ADMELOG) corrective regimen sliding scale   SubCutaneous three times a day before meals  insulin lispro (ADMELOG) corrective regimen sliding scale   SubCutaneous <User Schedule>  insulin lispro Injectable (ADMELOG) 5 Unit(s) SubCutaneous three times a day before meals  levothyroxine 75 MICROGram(s) Oral daily  midodrine. 30 milliGRAM(s) Oral <User Schedule>  midodrine. 20 milliGRAM(s) Oral <User Schedule>  Nephro-molly 1 Tablet(s) Oral daily  pantoprazole    Tablet 40 milliGRAM(s) Oral every 24 hours  psyllium Powder 1 Packet(s) Oral two times a day  pyridostigmine 30 milliGRAM(s) Oral two times a day  triamcinolone 0.1% Ointment 1 Application(s) Topical two times a day    MEDICATIONS  (PRN):  dextrose Oral Gel 15 Gram(s) Oral once PRN Blood Glucose LESS THAN 70 milliGRAM(s)/deciliter      Allergies    contrast media (iodine-based) (Fever; Vomiting; Flushing; Hypotension; Rash; Stomach Upset)  Ativan (Rash; Urticaria)  penicillins (Hives (Mod to Severe); Anaphylaxis (Mod to Severe); Short breath (Mod to Severe); Angioedema (Mod to Severe))    Intolerances        Vital Signs Last 24 Hrs  T(C): 36.9 (07 Jun 2024 04:08), Max: 36.9 (07 Jun 2024 04:08)  T(F): 98.4 (07 Jun 2024 04:08), Max: 98.4 (07 Jun 2024 04:08)  HR: 69 (07 Jun 2024 04:08) (65 - 77)  BP: 133/65 (07 Jun 2024 04:08) (133/65 - 134/70)  BP(mean): --  RR: 18 (07 Jun 2024 04:08) (18 - 18)  SpO2: 95% (07 Jun 2024 04:08) (95% - 96%)    Parameters below as of 07 Jun 2024 04:08  Patient On (Oxygen Delivery Method): room air        I&O's Summary    06 Jun 2024 07:01  -  07 Jun 2024 07:00  --------------------------------------------------------  IN: 780 mL / OUT: 1425 mL / NET: -645 mL        ON EXAM:    General: NAD, awake and alert, oriented x 3  HEENT: Mucous membranes are moist, anicteric  Lungs: Non-labored, breath sounds are clear bilaterally, No wheezing, rales or rhonchi  Cardiovascular: Regular, S1 and S2, +SM  Gastrointestinal: Bowel Sounds present, soft, nontender.   Lymph: chronic peripheral edema. No lymphadenopathy.  Skin: No rashes or ulcers  Psych:  Mood & affect appropriate    LABS: All Labs Reviewed:                        10.5   7.12  )-----------( 166      ( 07 Jun 2024 07:19 )             34.1     07 Jun 2024 07:19    139    |  107    |  24     ----------------------------<  127    3.9     |  20     |  1.61     Ca    8.4        07 Jun 2024 07:19  Phos  3.3       07 Jun 2024 07:19  Mg     2.0       07 Jun 2024 07:19    TPro  5.6    /  Alb  2.3    /  TBili  0.3    /  DBili  x      /  AST  20     /  ALT  8      /  AlkPhos  91     07 Jun 2024 07:19          Blood Culture:       TTE 6/4/24:  CONCLUSIONS:     1. Left ventricular systolic function is moderately decreased with an ejection fraction visually estimated at 35 to 40 %.   2. Multiple segmental abnormalities exist. See findings.   3. Compared to the transthoracic echocardiogram performed on 5/21/2024, there have been no significant intervalchanges.   4. Estimated pulmonary artery systolic pressure is 39 mmHg.   5. The inferior vena cava is normal in size (normal <2.1cm) with normal inspiratory collapse (normal >50%) consistent with normal right atrial pressure (~3, range 0-5mmHg).  Aortic Valve:  A a transcatheter deployed (TAVR)is present in the aortic position. The peak transaortic velocity is 2.47 m/s, peak transaortic gradient is 24.4 mmHg and mean transaortic gradient is 13.0 mmHg with an LVOT/aortic valve VTI ratio of 0.32.

## 2024-06-07 NOTE — PROGRESS NOTE ADULT - PROBLEM SELECTOR PLAN 2
5/29 retaining urine requiring straight cath. On straight cath x2 urine was cloudy/milky in appearance. Hardy catheter replaced. UA with >998 WBC, LE, large blood, >36 epithelial cells concerning for UTI. She reports no other urinary sx.  UCx: E. Coli and VRE E. Faecalis   -continue macrobid (6/3 - 6/6)  -s/p CTX (5/29 - 5/31)  -hardy placed 5/29. 5/29 retaining urine requiring straight cath. On straight cath x2 urine was cloudy/milky in appearance. Hardy catheter replaced. UA with >998 WBC, LE, large blood, >36 epithelial cells concerning for UTI. She reports no other urinary sx.  UCx: E. Coli and VRE E. Faecalis   -s/p macrobid (6/3 - 6/6)  -s/p CTX (5/29 - 5/31)  -hardy placed 5/29.

## 2024-06-07 NOTE — PROGRESS NOTE ADULT - NSPROGADDITIONALINFOA_GEN_ALL_CORE
Contact via [employer] Teams during business hours  To reach covering provider access AMION via sunrise tools  For Urgent matters/after-hours/weekends/holidays please page endocrine fellow on call   For nonurgent matters please email [email or web address]    Please note that this patient may be followed by different provider tomorrow.  Notify endocrine 24 hours prior to discharge for final recommendations

## 2024-06-07 NOTE — PROGRESS NOTE ADULT - SUBJECTIVE AND OBJECTIVE BOX
Marcial Erickson MD  PGY 1 Department of Internal Medicine        Patient is a 73y old  Female who presents with a chief complaint of Syncope (06 Jun 2024 19:34)      SUBJECTIVE / OVERNIGHT EVENTS: Pt seen and examined. No acute overnight events. Denies CP, SOB, Abdominal pain, N/V, Diarrhea          MEDICATIONS  (STANDING):  ammonium lactate 12% Lotion 1 Application(s) Topical <User Schedule>  apixaban 5 milliGRAM(s) Oral two times a day  aspirin  chewable 81 milliGRAM(s) Oral daily  atorvastatin 80 milliGRAM(s) Oral at bedtime  calamine/zinc oxide Lotion 1 Application(s) Topical three times a day  dextrose 10% Bolus 125 milliLiter(s) IV Bolus once  dextrose 5%. 1000 milliLiter(s) (50 mL/Hr) IV Continuous <Continuous>  dextrose 5%. 1000 milliLiter(s) (100 mL/Hr) IV Continuous <Continuous>  dextrose 50% Injectable 25 Gram(s) IV Push once  dextrose 50% Injectable 12.5 Gram(s) IV Push once  droxidopa 600 milliGRAM(s) Oral three times a day  ferrous    sulfate 325 milliGRAM(s) Oral two times a day  fludroCORTISONE 0.2 milliGRAM(s) Oral daily  glucagon  Injectable 1 milliGRAM(s) IntraMuscular once  insulin glargine Injectable (LANTUS) 14 Unit(s) SubCutaneous at bedtime  insulin lispro (ADMELOG) corrective regimen sliding scale   SubCutaneous <User Schedule>  insulin lispro (ADMELOG) corrective regimen sliding scale   SubCutaneous three times a day before meals  insulin lispro Injectable (ADMELOG) 5 Unit(s) SubCutaneous three times a day before meals  levothyroxine 75 MICROGram(s) Oral daily  midodrine. 30 milliGRAM(s) Oral <User Schedule>  midodrine. 20 milliGRAM(s) Oral <User Schedule>  Nephro-molly 1 Tablet(s) Oral daily  pantoprazole    Tablet 40 milliGRAM(s) Oral every 24 hours  psyllium Powder 1 Packet(s) Oral two times a day  pyridostigmine 30 milliGRAM(s) Oral two times a day  triamcinolone 0.1% Ointment 1 Application(s) Topical two times a day    MEDICATIONS  (PRN):  dextrose Oral Gel 15 Gram(s) Oral once PRN Blood Glucose LESS THAN 70 milliGRAM(s)/deciliter      I&O's Summary    06 Jun 2024 07:01  -  07 Jun 2024 07:00  --------------------------------------------------------  IN: 780 mL / OUT: 1425 mL / NET: -645 mL        Vital Signs Last 24 Hrs  T(C): 36.9 (07 Jun 2024 04:08), Max: 36.9 (07 Jun 2024 04:08)  T(F): 98.4 (07 Jun 2024 04:08), Max: 98.4 (07 Jun 2024 04:08)  HR: 69 (07 Jun 2024 04:08) (65 - 77)  BP: 133/65 (07 Jun 2024 04:08) (133/65 - 134/70)  BP(mean): --  RR: 18 (07 Jun 2024 04:08) (18 - 18)  SpO2: 95% (07 Jun 2024 04:08) (95% - 96%)    Parameters below as of 07 Jun 2024 04:08  Patient On (Oxygen Delivery Method): room air        CAPILLARY BLOOD GLUCOSE      POCT Blood Glucose.: 145 mg/dL (07 Jun 2024 02:25)  POCT Blood Glucose.: 133 mg/dL (06 Jun 2024 21:44)  POCT Blood Glucose.: 107 mg/dL (06 Jun 2024 17:09)  POCT Blood Glucose.: 119 mg/dL (06 Jun 2024 12:29)  POCT Blood Glucose.: 98 mg/dL (06 Jun 2024 08:43)      PHYSICAL EXAM:  GENERAL: NAD,   HEAD:  Atraumatic, Normocephalic  EYES: EOMI, conjunctiva and sclera clear  ENMT: Moist mucous membranes  CHEST/LUNG: Clear to auscultation bilaterally; No rales, rhonchi, wheezing, or rubs  HEART: Regular rate and rhythm, systolic murmur  ABDOMEN: soft nondistended nontender  EXTREMITIES:  2+ Peripheral Pulses, 1-2+ b/l edema up to thighs in ace wrap. LUE in dressing with edema and erythema but no warmth or tenderness. Full ROM with strength/sensation/pulses intact  SKIN: erythematous, macular rash to anterior chest wall, neck, and upper extremities improving  NERVOUS SYSTEM:  Alert, no focal deficits  PSYCH:  Appropriate affect       LABS:                      RADIOLOGY & ADDITIONAL TESTS:    Imaging Personally Reviewed:    Consultant(s) Notes Reviewed:      Care Discussed with Consultants/Other Providers:

## 2024-06-07 NOTE — PROGRESS NOTE ADULT - ASSESSMENT
73F w HFrEF (EF 30%), mod AS, known LBBB, HTN, IDDM1, CKD, hypothyroidism, chronic lymphedema, suspected OHS/LELIA on 3L NC home O2 p/w 1 episode of syncope. Recent admission at [facility] (3/29-4/5) for ADHF and DUNCAN s/p diuresis, discharged with new home O2 3L NC suspecting OHS/LELIA pending outpatient w/u. Since discharge a week ago, she has been staying at home with   Pt had sob walking to car. Once in car, she was still breathing heavily. Partner noticed pt was not responding to verbal stimuli for 10-15sec and witnessed R arm jerking. Pt gained consciousness quickly without postictal symptoms. Pt went to Cardiologist Dr. Nathan who recommended pt to come to ED. No fever, cp, abd pain, n/v. Leg swelling is improved from prior. Pt on torsemide 40mg which she has been taking. Pt was discharged on 3LNC which she is currently on. Patient reports she did not take Farxiga that she was discharged on as she was concerned about side effects.     In ED, patient was found afebrile, hemodynamically stable, breathing well on 3L NC. Initial labs notable for mild hyponatremia, DUNCAN on CKD, elevated proBNP and elevated pCO2 both improved from prior.  pt also noticed with abn creatinine. had severe AS had ct scan with IV contrast had contrast nephropathy and hypotension ---> CRRT required       1- CKD IV  2- CHF   3- lymphedema   4- severe AS  s/p tavr 4/24  5- hypotension orthostatic       creatinine is overall steady in this range as of 6.3  to have lytes and cr checked in am   her edema has been worsening, and bed weight has been rising  diuretics have not been restarted due to persistent orthostasis   orthostatic hypotension, had some mild improvement with PT yesterday  midodrine 30 mg am, and afternoon  midodrine 20 mg bedtime,  northera 600 mg TID   cont mestinone 30 mg 8 am and 1 pm   florinef 0.2mg decreased to daily decrease to 0.1 mg daily  continue to check orthostatic bp  also PT for conditioning   anemia, hb higher  now off epogen  non-oliguric,

## 2024-06-07 NOTE — PROGRESS NOTE ADULT - PROBLEM SELECTOR PLAN 1
Likely in setting of deconditioning.  Improving with fluids.  Will attempt orthostatics daily.  Hoping to wean off midodrine in order to restart GDMT.  Orthostatics improving from supine to seated, but still problematic with standing.  Remains orthostatic and symptomatic on standing  6/7 able to tolerate prolonged standing, tolerating chair for several hours  - leg compression, will attempt abdominal binder however difficult due to body habitus   - OOB as tolerated   - midodrine TID (30mg, 30mg, 20mg).  - continue droxydopa 600mg TID  - Continue fludrocortisone 0.2mg daily  - Continue to evaluate standing orthostatics daily  - continue pyridostigmine 30mg bid Likely in setting of deconditioning.  Improving with fluids.  Will attempt orthostatics daily.  Hoping to wean off midodrine in order to restart GDMT.  Orthostatics improving from supine to seated, but still problematic with standing.    6/6 standing BP 86/56, tolerating chair for several hours  - leg compression, will attempt abdominal binder however difficult due to body habitus   - OOB as tolerated   - midodrine TID (30mg, 30mg, 20mg).  - continue droxydopa 600mg TID  - Continue fludrocortisone 0.2mg daily  - Continue to evaluate standing orthostatics daily  - continue pyridostigmine 30mg bid Likely in setting of deconditioning.  Improving with fluids.  Will attempt orthostatics daily.  Hoping to wean off midodrine in order to restart GDMT.  Orthostatics improving from supine to seated, but still problematic with standing.    6/6 standing BP 86/56, tolerating chair for several hours  - leg compression, will attempt abdominal binder however difficult due to body habitus   - OOB as tolerated   - midodrine TID (30mg, 30mg, 20mg).  - continue droxydopa 600mg TID  - Continue fludrocortisone 0.2mg daily  - Continue to evaluate standing orthostatics daily  - continue pyridostigmine 30mg tid

## 2024-06-08 NOTE — PROGRESS NOTE ADULT - PROBLEM SELECTOR PLAN 2
5/29 retaining urine requiring straight cath. On straight cath x2 urine was cloudy/milky in appearance. Hardy catheter replaced. UA with >998 WBC, LE, large blood, >36 epithelial cells concerning for UTI. She reports no other urinary sx.  UCx: E. Coli and VRE E. Faecalis   -s/p macrobid (6/3 - 6/6)  -s/p CTX (5/29 - 5/31)  -hardy placed 5/29.

## 2024-06-08 NOTE — PROGRESS NOTE ADULT - ASSESSMENT
73F w/h/o of T1DM with unknown control due to CKD and anemia of chronic disease. DM c/b CKD. Also h/o HFrEF (EF 30%), mod AS, known LBBB, HTN,  hypothyroidism, chronic lymphedema, suspected OHS/LELIA on 3L NC home O2 p/w 1 episode of syncope with R arm jerking, likely 2/2 severe symptomatic AS. S/p AVR 4/24 c/b DUNCAN on CKD, fever and hypotension. Also UTI/pul edema/ sepsis and L adrenal nodule finding. Also orthostatic hypotension > now on Florinef. BG levels variable due to variable PO intake. Patient also refusing insulin on and off. Tightly controlled over past 24 hours so will adjust regimen. BG goal (100-180mg/dl).     Per endocrine attending Dr Burton> Adrenal nodule work up completed and pt will need to f/u once she is more stable as out pt.     Home regimen: Lantus 33 units qhs, Novolog based on sliding scale TIDAC normally 3-5 units  Has Dexcom G7

## 2024-06-08 NOTE — PROGRESS NOTE ADULT - SUBJECTIVE AND OBJECTIVE BOX
Internal Medicine   Brittnee Johnson | PGY-3    OVERNIGHT EVENTS:      SUBJECTIVE:       MEDICATIONS  (STANDING):  ammonium lactate 12% Lotion 1 Application(s) Topical <User Schedule>  apixaban 5 milliGRAM(s) Oral two times a day  aspirin  chewable 81 milliGRAM(s) Oral daily  atorvastatin 80 milliGRAM(s) Oral at bedtime  calamine/zinc oxide Lotion 1 Application(s) Topical three times a day  dextrose 10% Bolus 125 milliLiter(s) IV Bolus once  dextrose 5%. 1000 milliLiter(s) (100 mL/Hr) IV Continuous <Continuous>  dextrose 5%. 1000 milliLiter(s) (50 mL/Hr) IV Continuous <Continuous>  dextrose 50% Injectable 25 Gram(s) IV Push once  dextrose 50% Injectable 12.5 Gram(s) IV Push once  droxidopa 600 milliGRAM(s) Oral three times a day  ferrous    sulfate 325 milliGRAM(s) Oral two times a day  fludroCORTISONE 0.1 milliGRAM(s) Oral daily  glucagon  Injectable 1 milliGRAM(s) IntraMuscular once  insulin glargine Injectable (LANTUS) 14 Unit(s) SubCutaneous at bedtime  insulin lispro (ADMELOG) corrective regimen sliding scale   SubCutaneous three times a day before meals  insulin lispro (ADMELOG) corrective regimen sliding scale   SubCutaneous <User Schedule>  insulin lispro Injectable (ADMELOG) 5 Unit(s) SubCutaneous three times a day before meals  levothyroxine 75 MICROGram(s) Oral daily  midodrine. 30 milliGRAM(s) Oral <User Schedule>  midodrine. 20 milliGRAM(s) Oral <User Schedule>  Nephro-molly 1 Tablet(s) Oral daily  pantoprazole    Tablet 40 milliGRAM(s) Oral every 24 hours  psyllium Powder 1 Packet(s) Oral two times a day  pyridostigmine 30 milliGRAM(s) Oral <User Schedule>  triamcinolone 0.1% Ointment 1 Application(s) Topical two times a day    MEDICATIONS  (PRN):  dextrose Oral Gel 15 Gram(s) Oral once PRN Blood Glucose LESS THAN 70 milliGRAM(s)/deciliter        T(F): 98 (06-08-24 @ 05:12), Max: 98 (06-07-24 @ 21:03)  HR: 78 (06-08-24 @ 05:12) (68 - 78)  BP: 137/65 (06-08-24 @ 05:12) (137/65 - 151/69)  BP(mean): --  RR: 18 (06-08-24 @ 05:12) (18 - 18)  SpO2: 95% (06-08-24 @ 05:12) (95% - 96%)    PHYSICAL EXAM:     GENERAL: NAD, lying in bed comfortably.  HEAD:  Atraumatic, normocephalic.  EYES: EOMI, PERRLA, conjunctiva and sclera clear, no nystagmus noted.  ENT: Moist mucous membranes.   NECK: Supple. No JVD. Trachea midline.  CHEST/LUNG: CTAB. No rales, rhonchi, wheezing, or rubs. Unlabored respirations.  HEART: RRR, no M/R/G, normal S1/S2.  ABDOMEN: Soft, nontender, nondistended, no organomegaly. Normoactive bowel sounds.  EXTREMITIES:  2+ peripheral pulses b/l, brisk capillary refill. No clubbing, cyanosis, or edema.  MSK: No gross deformities noted.   NEURO:  AAOx3, no focal deficits.   SKIN: No rashes or lesions.  PSYCH: Normal mood, affect.    TELEMETRY:    LABS:                        10.5   7.12  )-----------( 166      ( 07 Jun 2024 07:19 )             34.1     06-07    139  |  107  |  24<H>  ----------------------------<  127<H>  3.9   |  20<L>  |  1.61<H>    Ca    8.4      07 Jun 2024 07:19  Phos  3.3     06-07  Mg     2.0     06-07    TPro  5.6<L>  /  Alb  2.3<L>  /  TBili  0.3  /  DBili  x   /  AST  20  /  ALT  8<L>  /  AlkPhos  91  06-07            Creatinine Trend: 1.61<--, 2.06<--, 1.77<--, 1.95<--, 2.50<--, 2.39<--  I&O's Summary    07 Jun 2024 07:01  -  08 Jun 2024 07:00  --------------------------------------------------------  IN: 720 mL / OUT: 1250 mL / NET: -530 mL      BNP    RADIOLOGY & ADDITIONAL STUDIES:             Internal Medicine   Brittnee Johnson | PGY-3    OVERNIGHT EVENTS: KASSI      SUBJECTIVE: Patient was seen and examined at bedside this morning. Denies any nausea/vomiting/diarrhea, headache, shortness of breath, abdominal pain or chest pain/palpitations. Patient responding appropriately to questions and able to make needs known.       MEDICATIONS  (STANDING):  ammonium lactate 12% Lotion 1 Application(s) Topical <User Schedule>  apixaban 5 milliGRAM(s) Oral two times a day  aspirin  chewable 81 milliGRAM(s) Oral daily  atorvastatin 80 milliGRAM(s) Oral at bedtime  calamine/zinc oxide Lotion 1 Application(s) Topical three times a day  dextrose 10% Bolus 125 milliLiter(s) IV Bolus once  dextrose 5%. 1000 milliLiter(s) (100 mL/Hr) IV Continuous <Continuous>  dextrose 5%. 1000 milliLiter(s) (50 mL/Hr) IV Continuous <Continuous>  dextrose 50% Injectable 25 Gram(s) IV Push once  dextrose 50% Injectable 12.5 Gram(s) IV Push once  droxidopa 600 milliGRAM(s) Oral three times a day  ferrous    sulfate 325 milliGRAM(s) Oral two times a day  fludroCORTISONE 0.1 milliGRAM(s) Oral daily  glucagon  Injectable 1 milliGRAM(s) IntraMuscular once  insulin glargine Injectable (LANTUS) 14 Unit(s) SubCutaneous at bedtime  insulin lispro (ADMELOG) corrective regimen sliding scale   SubCutaneous three times a day before meals  insulin lispro (ADMELOG) corrective regimen sliding scale   SubCutaneous <User Schedule>  insulin lispro Injectable (ADMELOG) 5 Unit(s) SubCutaneous three times a day before meals  levothyroxine 75 MICROGram(s) Oral daily  midodrine. 30 milliGRAM(s) Oral <User Schedule>  midodrine. 20 milliGRAM(s) Oral <User Schedule>  Nephro-molly 1 Tablet(s) Oral daily  pantoprazole    Tablet 40 milliGRAM(s) Oral every 24 hours  psyllium Powder 1 Packet(s) Oral two times a day  pyridostigmine 30 milliGRAM(s) Oral <User Schedule>  triamcinolone 0.1% Ointment 1 Application(s) Topical two times a day    MEDICATIONS  (PRN):  dextrose Oral Gel 15 Gram(s) Oral once PRN Blood Glucose LESS THAN 70 milliGRAM(s)/deciliter        T(F): 98 (06-08-24 @ 05:12), Max: 98 (06-07-24 @ 21:03)  HR: 78 (06-08-24 @ 05:12) (68 - 78)  BP: 137/65 (06-08-24 @ 05:12) (137/65 - 151/69)  BP(mean): --  RR: 18 (06-08-24 @ 05:12) (18 - 18)  SpO2: 95% (06-08-24 @ 05:12) (95% - 96%)    PHYSICAL EXAM:     GENERAL: NAD, lying in bed comfortably.  HEAD:  Atraumatic, normocephalic.   NECK: Supple.   CHEST/LUNG: CTAB. No rales, rhonchi, wheezing, or rubs. Unlabored respirations.  HEART: RRR, no M/R/G, normal S1/S2.  ABDOMEN: Soft, obese, nontender, nondistended, no guarding. Normoactive bowel sounds.  EXTREMITIES:  2+ peripheral pulses b/l. 4+ pitting edema b/l.  MSK: No gross deformities noted.   NEURO:  AAOx3, no focal deficits.   SKIN: No rashes or lesions.  PSYCH: Normal mood, affect.    TELEMETRY:    LABS:                        10.5   7.12  )-----------( 166      ( 07 Jun 2024 07:19 )             34.1     06-07    139  |  107  |  24<H>  ----------------------------<  127<H>  3.9   |  20<L>  |  1.61<H>    Ca    8.4      07 Jun 2024 07:19  Phos  3.3     06-07  Mg     2.0     06-07    TPro  5.6<L>  /  Alb  2.3<L>  /  TBili  0.3  /  DBili  x   /  AST  20  /  ALT  8<L>  /  AlkPhos  91  06-07            Creatinine Trend: 1.61<--, 2.06<--, 1.77<--, 1.95<--, 2.50<--, 2.39<--  I&O's Summary    07 Jun 2024 07:01  -  08 Jun 2024 07:00  --------------------------------------------------------  IN: 720 mL / OUT: 1250 mL / NET: -530 mL      BNP    RADIOLOGY & ADDITIONAL STUDIES:

## 2024-06-08 NOTE — PROGRESS NOTE ADULT - PROBLEM SELECTOR PLAN 3
Incidentally found on CT c/a/p w/wo IV contrast done for TAVR evaluation. The left adrenal gland is thickened and a 1.2 x 0.8 cm left adrenal nodule is seen. The right adrenal gland is normal.   Primary team discussed with radiology. HU of the adrenal nodule not able to be accurately determined due to motion artifact, also given imaging was taken at venous phase.    Patient had another CT scan which showed normal adrenals on 5/3/24    Test for excess adrenal hormones:   - Dex suppression test: AM cortisol 9.4 not suppressed with sufficiently high dexamethasone 374 (4/18/24). Repeat test AM cortisol 4.1 not suppressed with ACTH 9.5, dex level 449 (4/20/24). Concerning for possible Cushings, likely primary adrenal source given ACTH not elevated, however, patient is also in ICU on pressors and in a stressed state. Urine cortisol low at 1.2mcg/24 hour but only 200ml for 24 hours. Salivary cortisol cancelled for QNS, repeat salivary cortisol specimen received   - plasma metanephrines 46.8 normal range    - serum aldosterone is elevated to 37.4. Plasma renin activity 9.775. Ratio = 3.82, not elevated, no hyperaldosteronism.    - DHEAS 139 wnl. Androstenedione <10.    PLAN  - Patient with 2 positive DST - concerning for cushing syndrome. No indication for treatment at this time, would recommend repeating testing outpatient after patient resolved from acute critical illness - will give signout to patient's endocrinologist Dr. Luis Dumont prior to discharge   - Follow up salivary cortisol as out pt as per Dr Burton. Dr Burton has sopken to primary team addressing this issue    - May need to repeat 24 hour urine cortisol once renal function improved and no longer on CRRT   -Renal function improving but remains with creat in 3s. No need to test at this time. If team wishes can send salivary cortisol and 24 hour urine cortisol prior to discharge but per Dr Burton, test results won't be available  until 1-2 weeks.   - Will need follow up outpatient with Dr. Luis Dumont.

## 2024-06-08 NOTE — PROGRESS NOTE ADULT - TIME BILLING
- Review of records, telemetry, vital signs and daily labs.   - General and cardiovascular physical examination.  - Generation of cardiovascular treatment plan.  - Coordination of care.      Patient was seen and examined by me on 06/08/2024,interim events noted,labs and radiology studies reviewed.  Cuco Tubbs MD,FACC.  [address] [place], [place] [id number].  [phone]

## 2024-06-08 NOTE — PROGRESS NOTE ADULT - SUBJECTIVE AND OBJECTIVE BOX
MR#[id number]  PATIENT NAME:JOHNATHAN LOPEZ    DATE OF SERVICE: 06-08-24 @ 07:11  Patient was seen and examined by Cuco Tubbs MD on    06-08-24 @ 07:11 .  Interim events noted.Consultant notes ,Labs,Telemetry reviewed by me       HOSPITAL COURSE: HPI:  73F w HFrEF (EF 30%), mod AS, known LBBB, HTN, IDDM1, CKD, hypothyroidism, chronic lymphedema, suspected OHS/LELIA on 3L NC home O2 p/w 1 episode of syncope. Recent admission at [facility] (3/29-4/5) for ADHF and DUNCAN s/p diuresis, discharged with new home O2 3L NC suspecting OHS/LELIA pending outpatient w/u. Since discharge a week ago, she has been staying at home with   Pt had sob walking to car. Once in car, she was still breathing heavily. Partner noticed pt was not responding to verbal stimuli for 10-15sec and witnessed R arm jerking. Pt gained consciousness quickly without postictal symptoms. Pt went to Cardiologist Dr. Nathan who recommended pt to come to ED. No fever, cp, abd pain, n/v. Leg swelling is improved from prior. Pt on torsemide 40mg which she has been taking. Pt was discharged on 3LNC which she is currently on. Patient reports she did not take Farxiga that she was discharged on as she was concerned about side effects.     In ED, patient was found afebrile, hemodynamically stable, breathing well on 3L NC. Initial labs notable for mild hyponatremia, DUNCAN on CKD, elevated proBNP and elevated pCO2 both improved from prior. (12 Apr 2024 16:31)      INTERIM EVENTS:Patient seen at bedside ,interim events noted.  s/p TAVR 4/24 c/b by VT and Vfib requiring shock and flash pulmonary edema requiring intubation.   Course complicated by contrast induced allergic reaction (had CTA for TAVR eval) and contrast related renal failure (acute on chronic) requiring CRRT for fluid removal and pressors for vasoplegia and hypovolemia due to CRRT.   Patient now extubated, off pressors, CRRT and downgraded to medicine floors.    Patient had maroon-colored stool on 5/24, positive for blood.  Hemodynamically stable with stable hemoglobin.  RESOLVED  6/1-Awake was sitting yesterday still orthostatic-had episode tachycardia overnight-Atrial tachy/Fib  6/7 Awake is able to sit OOB and stand less dizzy    PMH -reviewed admission note, no change since admission    MEDICATIONS  (STANDING):  ammonium lactate 12% Lotion 1 Application(s) Topical <User Schedule>  apixaban 5 milliGRAM(s) Oral two times a day  aspirin  chewable 81 milliGRAM(s) Oral daily  atorvastatin 80 milliGRAM(s) Oral at bedtime  calamine/zinc oxide Lotion 1 Application(s) Topical three times a day  droxidopa 600 milliGRAM(s) Oral three times a day  ferrous    sulfate 325 milliGRAM(s) Oral two times a day  fludroCORTISONE 0.1 milliGRAM(s) Oral daily  glucagon  Injectable 1 milliGRAM(s) IntraMuscular once  insulin glargine Injectable (LANTUS) 14 Unit(s) SubCutaneous at bedtime  insulin lispro (ADMELOG) corrective regimen sliding scale   SubCutaneous three times a day before meals  insulin lispro (ADMELOG) corrective regimen sliding scale   SubCutaneous <User Schedule>  insulin lispro Injectable (ADMELOG) 5 Unit(s) SubCutaneous three times a day before meals  levothyroxine 75 MICROGram(s) Oral daily  midodrine. 30 milliGRAM(s) Oral <User Schedule>  midodrine. 20 milliGRAM(s) Oral <User Schedule>  Nephro-molly 1 Tablet(s) Oral daily  pantoprazole    Tablet 40 milliGRAM(s) Oral every 24 hours  psyllium Powder 1 Packet(s) Oral two times a day  pyridostigmine 30 milliGRAM(s) Oral <User Schedule>  triamcinolone 0.1% Ointment 1 Application(s) Topical two times a day    MEDICATIONS  (PRN):  dextrose Oral Gel 15 Gram(s) Oral once PRN Blood Glucose LESS THAN 70 milliGRAM(s)/deciliter            REVIEW OF SYSTEMS:  Constitutional: [ ] fever, [ ]weight loss,  [ ]fatigue [ ]weight gain  Eyes: [ ] visual changes  Respiratory: [ ]shortness of breath;  [ ] cough, [ ]wheezing, [ ]chills, [ ]hemoptysis  Cardiovascular: [ ] chest pain, [ ]palpitations, [ ]dizziness,  [ ]leg swelling[ ]orthopnea[ ]PND  Gastrointestinal: [ ] abdominal pain, [ ]nausea, [ ]vomiting,  [ ]diarrhea [ ]Constipation [ ]Melena  Genitourinary: [ ] dysuria, [ ] hematuria [ ][place]  Neurologic: [ ] headaches [ ] tremors[ ]weakness [ ]Paralysis Right[ ] Left[ ]  Skin: [ ] itching, [ ]burning, [ ] rashes  Endocrine: [ ] heat or cold intolerance  Musculoskeletal: [ ] joint pain or swelling; [ ] muscle, back, or extremity pain  Psychiatric: [ ] depression, [ ]anxiety, [ ]mood swings, or [ ]difficulty sleeping  Hematologic: [ ] easy bruising, [ ] bleeding gums    [ ] All remaining systems negative except as per above.   [ ]Unable to obtain.  [x] No change in ROS since admission      Vital Signs Last 24 Hrs  T(C): 36.7 (08 Jun 2024 05:12), Max: 36.7 (07 Jun 2024 21:03)  T(F): 98 (08 Jun 2024 05:12), Max: 98 (07 Jun 2024 21:03)  HR: 78 (08 Jun 2024 05:12) (68 - 78)  BP: 137/65 (08 Jun 2024 05:12) (137/65 - 151/69)    RR: 18 (08 Jun 2024 05:12) (18 - 18)  SpO2: 95% (08 Jun 2024 05:12) (95% - 96%)    Parameters below as of 08 Jun 2024 05:12  Patient On (Oxygen Delivery Method): room air      I&O's Summary    07 Jun 2024 07:01  -  08 Jun 2024 07:00  --------------------------------------------------------  IN: 720 mL / OUT: 1250 mL / NET: -530 mL        PHYSICAL EXAM:  General: No acute distress BMI-33  HEENT: EOMI, PERRL  Neck: Supple, [ ] JVD  Lungs: Equal air entry bilaterally; [ ] rales [ ] wheezing [ ] rhonchi  Heart: Regular rate and rhythm; [x ] murmur   2/6 [ x] systolic [ ] diastolic [ ] radiation[ ] rubs [ ]  gallops  Abdomen: Nontender, bowel sounds present  Extremities: No clubbing, cyanosis, [xx ] edema [ ]Pulses  equal and intact  Nervous system:  Alert & Oriented X3, no focal deficits  Psychiatric: Normal affect  Skin: No rashes or lesions    LABS:  06-07    139  |  107  |  24<H>  ----------------------------<  127<H>  3.9   |  20<L>  |  1.61<H>    Ca    8.4      07 Jun 2024 07:19  Phos  3.3     06-07  Mg     2.0     06-07    TPro  5.6<L>  /  Alb  2.3<L>  /  TBili  0.3  /  DBili  x   /  AST  20  /  ALT  8<L>  /  AlkPhos  91  06-07    Creatinine Trend: 1.61<--, 2.06<--, 1.77<--, 1.95<--, 2.50<--, 2.39<--                        10.5   7.12  )-----------( 166      ( 07 Jun 2024 07:19 )             34.1         TTE 6/4/24:  CONCLUSIONS:     1. Left ventricular systolic function is moderately decreased with an ejection fraction visually estimated at 35 to 40 %.   2. Multiple segmental abnormalities exist. See findings.   3. Compared to the transthoracic echocardiogram performed on 5/21/2024, there have been no significant intervalchanges.   4. Estimated pulmonary artery systolic pressure is 39 mmHg.   5. The inferior vena cava is normal in size (normal <2.1cm) with normal inspiratory collapse (normal >50%) consistent with normal right atrial pressure (~3, range 0-5mmHg).  Aortic Valve:  A a transcatheter deployed (TAVR)is present in the aortic position. The peak transaortic velocity is 2.47 m/s, peak transaortic gradient is 24.4 mmHg and mean transaortic gradient is 13.0 mmHg with an LVOT/aortic valve VTI ratio of 0.32.

## 2024-06-08 NOTE — PROGRESS NOTE ADULT - NSPROGADDITIONALINFOA_GEN_ALL_CORE
Any inquiries please email [email or web address]   office:  [phone] (M-F 9a-5pm)               [phone] (nights/weekends)   Can access Amion coverage via sunrise/tools

## 2024-06-08 NOTE — PROGRESS NOTE ADULT - PROBLEM SELECTOR PLAN 2
Thyroid Function Tests:  05-28 @ 06:42 TSH 10.20 FreeT4 -- T3 -- Anti TPO -- Anti Thyroglobulin Ab -- TSI --  - Continue home LT4 75mcg daily. Noted LT4 given with Protonix which inhibits LT4 absorption causing TSH levels to go up.   -Check Free T4 (Add on today's TSH)  - PLEASE make sure LT4 is given on an empty stomach at least one hour apart from other meds and food to ensure med absorption. DO NOT GIVE WITH VITAMINS/ANTACIDS. Noted now pt getting LT4 and PPI at different times.

## 2024-06-08 NOTE — PROGRESS NOTE ADULT - PROBLEM SELECTOR PLAN 1
Check BG TID AC, HS, and 2AM  - Adjust Lantus 12 units HS  - Adjust Admelog 4 units TID AC  - C/w Low dose Admelog correction scale TID AC, low dose Admelog correction scale QHS and 2am in case of rebound hypo/hyperglycemia      Discharge:  - Will be determined according to BG/insulin needs/PO intake  at time of discharge; basal/bolus insulin doses TBD  - Of note, patient instructed on discharge from recent hospitalization at [place] to start Farxiga for CHF. Given risks of DKA of SGLT2i in T1DM, would not start until better data is available for its benefits.  - Follow up with Dr. Luis Dumont outpatient  - Patient will likely required rehab  - F/u with optho/renal/cardiac as outpatient.

## 2024-06-08 NOTE — PROGRESS NOTE ADULT - SUBJECTIVE AND OBJECTIVE BOX
Diabetes Follow up note:    Chief complaint: T1DM    Interval Hx:    Review of Systems:  General:  GI: Tolerating POs. Denies N/V/D/Abd pain  CV: Denies CP/SOB  ENDO: No S&Sx of hypoglycemia    MEDS:  atorvastatin 80 milliGRAM(s) Oral at bedtime    fludroCORTISONE 0.1 milliGRAM(s) Oral daily    insulin glargine Injectable (LANTUS) 14 Unit(s) SubCutaneous at bedtime  insulin lispro (ADMELOG) corrective regimen sliding scale   SubCutaneous three times a day before meals  insulin lispro (ADMELOG) corrective regimen sliding scale   SubCutaneous <User Schedule>  insulin lispro Injectable (ADMELOG) 5 Unit(s) SubCutaneous three times a day before meals  levothyroxine 75 MICROGram(s) Oral daily      Allergies    contrast media (iodine-based) (Fever; Vomiting; Flushing; Hypotension; Rash; Stomach Upset)  Ativan (Rash; Urticaria)  penicillins (Hives (Mod to Severe); Anaphylaxis (Mod to Severe); Short breath (Mod to Severe); Angioedema (Mod to Severe))        PE:  General:  Vital Signs Last 24 Hrs  T(C): 36.7 (08 Jun 2024 05:12), Max: 36.7 (07 Jun 2024 21:03)  T(F): 98 (08 Jun 2024 05:12), Max: 98 (07 Jun 2024 21:03)  HR: 78 (08 Jun 2024 05:12) (68 - 78)  BP: 137/65 (08 Jun 2024 05:12) (137/65 - 151/69)  BP(mean): --  RR: 18 (08 Jun 2024 05:12) (18 - 18)  SpO2: 95% (08 Jun 2024 05:12) (95% - 96%)    Parameters below as of 08 Jun 2024 05:12  Patient On (Oxygen Delivery Method): room air      Abd: Soft, NT,ND,   Extremities: Warm  Neuro: A&O X3    LABS:  POCT Blood Glucose.: 111 mg/dL (06-08-24 @ 08:26)  POCT Blood Glucose.: 201 mg/dL (06-08-24 @ 02:45)  POCT Blood Glucose.: 115 mg/dL (06-07-24 @ 22:44)  POCT Blood Glucose.: 76 mg/dL (06-07-24 @ 21:42)  POCT Blood Glucose.: 90 mg/dL (06-07-24 @ 17:27)  POCT Blood Glucose.: 86 mg/dL (06-07-24 @ 12:42)  POCT Blood Glucose.: 116 mg/dL (06-07-24 @ 08:40)  POCT Blood Glucose.: 145 mg/dL (06-07-24 @ 02:25)  POCT Blood Glucose.: 133 mg/dL (06-06-24 @ 21:44)  POCT Blood Glucose.: 107 mg/dL (06-06-24 @ 17:09)  POCT Blood Glucose.: 119 mg/dL (06-06-24 @ 12:29)  POCT Blood Glucose.: 98 mg/dL (06-06-24 @ 08:43)  POCT Blood Glucose.: 112 mg/dL (06-06-24 @ 02:43)  POCT Blood Glucose.: 111 mg/dL (06-05-24 @ 22:53)  POCT Blood Glucose.: 103 mg/dL (06-05-24 @ 21:50)  POCT Blood Glucose.: 170 mg/dL (06-05-24 @ 17:14)  POCT Blood Glucose.: 100 mg/dL (06-05-24 @ 12:54)                            10.5   7.12  )-----------( 166      ( 07 Jun 2024 07:19 )             34.1       06-07    139  |  107  |  24<H>  ----------------------------<  127<H>  3.9   |  20<L>  |  1.61<H>    eGFR: 34<L>    Ca    8.4      06-07  Mg     2.0     06-07  Phos  3.3     06-07    TPro  5.6<L>  /  Alb  2.3<L>  /  TBili  0.3  /  DBili  x   /  AST  20  /  ALT  8<L>  /  AlkPhos  91  06-07      Thyroid Function Tests:  05-28 @ 06:42 TSH 10.20 FreeT4 1.0 T3 -- Anti TPO -- Anti Thyroglobulin Ab -- TSI --      A1C with Estimated Average Glucose Result: 7.4 % (03-30-24 @ 08:21)  A1C with Estimated Average Glucose Result: 6.8 % (01-10-24 @ 08:37)  A1C with Estimated Average Glucose Result: 7.3 % (08-12-23 @ 07:58)          Contact number: kishore [phone] or [phone]       Diabetes Follow up note:    Chief complaint: T1DM    Interval Hx: BG values 70-200s over past 24 hours. Pt seen at bedside. Reports fair appetite. Received lower insulin dose of 4 units for breakfast this AM. Otherwise no complaints.     Review of Systems:  General: denies pain  GI: Tolerating POs. Denies N/V/D/Abd pain  CV: Denies CP/SOB  ENDO: No S&Sx of hypoglycemia    MEDS:  atorvastatin 80 milliGRAM(s) Oral at bedtime    fludroCORTISONE 0.1 milliGRAM(s) Oral daily    insulin glargine Injectable (LANTUS) 14 Unit(s) SubCutaneous at bedtime  insulin lispro (ADMELOG) corrective regimen sliding scale   SubCutaneous three times a day before meals  insulin lispro (ADMELOG) corrective regimen sliding scale   SubCutaneous <User Schedule>  insulin lispro Injectable (ADMELOG) 5 Unit(s) SubCutaneous three times a day before meals  levothyroxine 75 MICROGram(s) Oral daily      Allergies    contrast media (iodine-based) (Fever; Vomiting; Flushing; Hypotension; Rash; Stomach Upset)  Ativan (Rash; Urticaria)  penicillins (Hives (Mod to Severe); Anaphylaxis (Mod to Severe); Short breath (Mod to Severe); Angioedema (Mod to Severe))        PE:  General: Female lying in bed. NAD.   Vital Signs Last 24 Hrs  T(C): 36.7 (08 Jun 2024 05:12), Max: 36.7 (07 Jun 2024 21:03)  T(F): 98 (08 Jun 2024 05:12), Max: 98 (07 Jun 2024 21:03)  HR: 78 (08 Jun 2024 05:12) (68 - 78)  BP: 137/65 (08 Jun 2024 05:12) (137/65 - 151/69)  BP(mean): --  RR: 18 (08 Jun 2024 05:12) (18 - 18)  SpO2: 95% (08 Jun 2024 05:12) (95% - 96%)    Parameters below as of 08 Jun 2024 05:12  Patient On (Oxygen Delivery Method): room air      Abd: Soft, NT,ND,   Extremities: Warm. B/L LE ace wrapped.   Neuro: A&O X3    LABS:  POCT Blood Glucose.: 111 mg/dL (06-08-24 @ 08:26)  POCT Blood Glucose.: 201 mg/dL (06-08-24 @ 02:45)  POCT Blood Glucose.: 115 mg/dL (06-07-24 @ 22:44)  POCT Blood Glucose.: 76 mg/dL (06-07-24 @ 21:42)  POCT Blood Glucose.: 90 mg/dL (06-07-24 @ 17:27)  POCT Blood Glucose.: 86 mg/dL (06-07-24 @ 12:42)  POCT Blood Glucose.: 116 mg/dL (06-07-24 @ 08:40)  POCT Blood Glucose.: 145 mg/dL (06-07-24 @ 02:25)  POCT Blood Glucose.: 133 mg/dL (06-06-24 @ 21:44)  POCT Blood Glucose.: 107 mg/dL (06-06-24 @ 17:09)  POCT Blood Glucose.: 119 mg/dL (06-06-24 @ 12:29)  POCT Blood Glucose.: 98 mg/dL (06-06-24 @ 08:43)  POCT Blood Glucose.: 112 mg/dL (06-06-24 @ 02:43)  POCT Blood Glucose.: 111 mg/dL (06-05-24 @ 22:53)  POCT Blood Glucose.: 103 mg/dL (06-05-24 @ 21:50)  POCT Blood Glucose.: 170 mg/dL (06-05-24 @ 17:14)  POCT Blood Glucose.: 100 mg/dL (06-05-24 @ 12:54)                            10.5   7.12  )-----------( 166      ( 07 Jun 2024 07:19 )             34.1       06-07    139  |  107  |  24<H>  ----------------------------<  127<H>  3.9   |  20<L>  |  1.61<H>    eGFR: 34<L>    Ca    8.4      06-07  Mg     2.0     06-07  Phos  3.3     06-07    TPro  5.6<L>  /  Alb  2.3<L>  /  TBili  0.3  /  DBili  x   /  AST  20  /  ALT  8<L>  /  AlkPhos  91  06-07      Thyroid Function Tests:  05-28 @ 06:42 TSH 10.20 FreeT4 1.0 T3 -- Anti TPO -- Anti Thyroglobulin Ab -- TSI --      A1C with Estimated Average Glucose Result: 7.4 % (03-30-24 @ 08:21)  A1C with Estimated Average Glucose Result: 6.8 % (01-10-24 @ 08:37)  A1C with Estimated Average Glucose Result: 7.3 % (08-12-23 @ 07:58)          Contact number: kishore [phone] or [phone]

## 2024-06-08 NOTE — PROGRESS NOTE ADULT - SUBJECTIVE AND OBJECTIVE BOX
INTERVAL HPI/OVERNIGHT EVENTS:  No new overnight event.  No N/V/D.  Tolerating diet.                MEDICATIONS  (STANDING):  ammonium lactate 12% Lotion 1 Application(s) Topical <User Schedule>  apixaban 5 milliGRAM(s) Oral two times a day  aspirin  chewable 81 milliGRAM(s) Oral daily  atorvastatin 80 milliGRAM(s) Oral at bedtime  calamine/zinc oxide Lotion 1 Application(s) Topical three times a day  dextrose 10% Bolus 125 milliLiter(s) IV Bolus once  dextrose 5%. 1000 milliLiter(s) (100 mL/Hr) IV Continuous <Continuous>  dextrose 5%. 1000 milliLiter(s) (50 mL/Hr) IV Continuous <Continuous>  dextrose 50% Injectable 25 Gram(s) IV Push once  dextrose 50% Injectable 12.5 Gram(s) IV Push once  droxidopa 600 milliGRAM(s) Oral three times a day  ferrous    sulfate 325 milliGRAM(s) Oral two times a day  fludroCORTISONE 0.1 milliGRAM(s) Oral daily  glucagon  Injectable 1 milliGRAM(s) IntraMuscular once  insulin glargine Injectable (LANTUS) 14 Unit(s) SubCutaneous at bedtime  insulin lispro (ADMELOG) corrective regimen sliding scale   SubCutaneous three times a day before meals  insulin lispro (ADMELOG) corrective regimen sliding scale   SubCutaneous <User Schedule>  insulin lispro Injectable (ADMELOG) 5 Unit(s) SubCutaneous three times a day before meals  levothyroxine 75 MICROGram(s) Oral daily  midodrine. 30 milliGRAM(s) Oral <User Schedule>  midodrine. 20 milliGRAM(s) Oral <User Schedule>  Nephro-molly 1 Tablet(s) Oral daily  pantoprazole    Tablet 40 milliGRAM(s) Oral every 24 hours  psyllium Powder 1 Packet(s) Oral two times a day  pyridostigmine 30 milliGRAM(s) Oral <User Schedule>  triamcinolone 0.1% Ointment 1 Application(s) Topical two times a day    MEDICATIONS  (PRN):  dextrose Oral Gel 15 Gram(s) Oral once PRN Blood Glucose LESS THAN 70 milliGRAM(s)/deciliter      Allergies    contrast media (iodine-based) (Fever; Vomiting; Flushing; Hypotension; Rash; Stomach Upset)  Ativan (Rash; Urticaria)  penicillins (Hives (Mod to Severe); Anaphylaxis (Mod to Severe); Short breath (Mod to Severe); Angioedema (Mod to Severe))    Intolerances                  General:  No wt loss, fevers, chills, night sweats, fatigue,   Eyes:  Good vision, no reported pain  ENT:  No sore throat, pain, runny nose, dysphagia  CV:  No pain, palpitations, hypo/hypertension  Resp:  No dyspnea, cough, tachypnea, wheezing  GI:  No pain, No nausea, No vomiting, No diarrhea, No constipation, No weight loss, No fever, No pruritis, No rectal bleeding, No tarry stools, No dysphagia,  :  No pain, bleeding, incontinence, nocturia  Muscle:  No pain, weakness  Neuro:  No weakness, tingling, memory problems  Psych:  No fatigue, insomnia, mood problems, depression  Endocrine:  No polyuria, polydipsia, cold/heat intolerance  Heme:  No petechiae, ecchymosis, easy bruisability  Skin:  No rash, tattoos, scars, edema        PHYSICAL EXAM  Vital Signs Last 24 Hrs  T(C): 36.7 (08 Jun 2024 05:12), Max: 36.7 (07 Jun 2024 21:03)  T(F): 98 (08 Jun 2024 05:12), Max: 98 (07 Jun 2024 21:03)  HR: 78 (08 Jun 2024 05:12) (68 - 78)  BP: 137/65 (08 Jun 2024 05:12) (137/65 - 151/69)  BP(mean): --  RR: 18 (08 Jun 2024 05:12) (18 - 18)  SpO2: 95% (08 Jun 2024 05:12) (95% - 96%)    Parameters below as of 08 Jun 2024 05:12  Patient On (Oxygen Delivery Method): room air                GENERAL:  Appears stated age, well-groomed, well-nourished, no distress  HEENT:  NC/AT,  conjunctivae clear and pink, no thyromegaly, nodules, adenopathy, no JVD, sclera -anicteric  CHEST:  Full & symmetric excursion, no increased effort, breath sounds clear  HEART:  Regular rhythm, S1, S2, no murmur/rub/S3/S4, no abdominal bruit, no edema  ABDOMEN:  Soft, non-tender, non-distended, normoactive bowel sounds,  no masses ,no hepato-splenomegaly, no signs of chronic liver disease  EXTEREMITIES:  no cyanosis,clubbing or edema  SKIN:  No rash/erythema/ecchymoses/petechiae/wounds/abscess/warm/dry  NEURO:  Alert, oriented, no asterixis, no tremor, no encephalopathy        LABS:                        10.5   7.12  )-----------( 166      ( 07 Jun 2024 07:19 )             34.1     06-07    139  |  107  |  24<H>  ----------------------------<  127<H>  3.9   |  20<L>  |  1.61<H>    Ca    8.4      07 Jun 2024 07:19  Phos  3.3     06-07  Mg     2.0     06-07    TPro  5.6<L>  /  Alb  2.3<L>  /  TBili  0.3  /  DBili  x   /  AST  20  /  ALT  8<L>  /  AlkPhos  91  06-07 06-07-24 @ 07:01  -  06-08-24 @ 07:00  --------------------------------------------------------  IN: 720 mL / OUT: 1250 mL / NET: -530 mL          amylase   lipase  RADIOLOGY & ADDITIONAL TESTS:

## 2024-06-08 NOTE — PROGRESS NOTE ADULT - PROBLEM SELECTOR PLAN 1
Likely in setting of deconditioning.  Improving with fluids.  Will attempt orthostatics daily.  Hoping to wean off midodrine in order to restart GDMT.  Orthostatics improving from supine to seated, but still problematic with standing.    6/6 standing BP 86/56, tolerating chair for several hours  - leg compression, will attempt abdominal binder however difficult due to body habitus   - OOB as tolerated   - midodrine TID (30mg, 30mg, 20mg).  - continue droxydopa 600mg TID  - Continue fludrocortisone 0.2mg daily  - Continue to evaluate standing orthostatics daily  - continue pyridostigmine 30mg tid Likely in setting of deconditioning.  Improving with fluids.  Will attempt orthostatics daily.  Hoping to wean off midodrine in order to restart GDMT.  Orthostatics improving from supine to seated, but still problematic with standing.    6/6 standing BP 86/56, tolerating chair for several hours  - leg compression, will attempt abdominal binder however difficult due to body habitus   - OOB as tolerated   - midodrine TID (30mg, 30mg, 20mg).  - continue droxydopa 600mg TID  - decreased fludrocortisone to 0.1mg daily  - Continue to evaluate standing orthostatics daily  - continue pyridostigmine 30mg tid

## 2024-06-08 NOTE — PROGRESS NOTE ADULT - ATTENDING COMMENTS
73F PMH: HFrEF (EF 30%), AS, LBBB, HTN, DM1, CKD, hypothyroidism, chronic lymphedema, LELIA (3L home O2) p/w for syncope 2/2 severe AS, s/p TAVR 4/24. Course complicated by contrast induced allergic reaction (had CTA for TAVR eval) and contrast related renal failure (acute on chronic) requiring HD. TAVR c/b VT and vfib requiring shock and flash pulmonary edema causing Acute hypoxic respiratory failure  requiring intubation.  Course complicated by contrast induced allergic reaction (had CTA for TAVR eval) and contrast related renal failure (acute on chronic) requiring CRRT for fluid removal and pressors for vasoplegia and hypovolemia due to CRRT. Patient extubated, now off pressors, CRRT.     1. Orthostatic hypotension- severe and persistent despite droxydopa, midodrine with florinef. Pyridostigmine started on 6/5. Orthostasis and symptoms improving, dose increased today.     2. Urinary retention- failed 3 voiding trials.   3. Left UE DVT- c/w eliquis. Repeat US showing persistent DVT noted in the left subclavian and axillary veins. ACE wrap for swelling.   4. T1DM- uncontrolled with intermittent episodes of labile BS. Being managed by endocrinology. Currently on Lantus and Admelog.    Rest of care per plan above  D/W Dr Johnson

## 2024-06-09 NOTE — PROGRESS NOTE ADULT - ATTENDING COMMENTS
73F PMH: HFrEF (EF 30%), AS, LBBB, HTN, DM1, CKD, hypothyroidism, chronic lymphedema, LELIA (3L home O2) p/w for syncope 2/2 severe AS, s/p TAVR 4/24. Course complicated by contrast induced allergic reaction (had CTA for TAVR eval) and contrast related renal failure (acute on chronic) requiring HD. TAVR c/b VT and vfib requiring shock and flash pulmonary edema causing Acute hypoxic respiratory failure  requiring intubation.  Course complicated by contrast induced allergic reaction (had CTA for TAVR eval) and contrast related renal failure (acute on chronic) requiring CRRT for fluid removal and pressors for vasoplegia and hypovolemia due to CRRT. Patient extubated, now off pressors, CRRT.     1. Orthostatic hypotension- severe and persistent despite droxydopa, midodrine with florinef. Pyridostigmine started on 6/5. Orthostasis and symptoms improving, continue working w/ PT    2. Urinary retention- failed 3 voiding trials.   3. Left UE DVT- c/w eliquis. Repeat US showing persistent DVT noted in the left subclavian and axillary veins. ACE wrap for swelling.   4. T1DM- uncontrolled with intermittent episodes of labile BS. Being managed by endocrinology. Currently on Lantus and Admelog.    Rest of care per plan above  D/W Dr Erickson

## 2024-06-09 NOTE — PROVIDER CONTACT NOTE (OTHER) - NAME OF MD/NP/PA/DO NOTIFIED:
MD Jose Francisco Kuo
Dr. Ko
MD Karol Merida
T2. Mireya Hayden Emihe, Oscar
Croa Lyn - Night Resident
Fuad Roberson
MD Fuad Roberson
MD Fuad Roberson
MD Jose Francisco Kuo
Amarjit Benites
Joanna Sanno
MD Ayaan Felton
Mesh,PA
Atsumi Kimura
Jairo Kaufman
Mireya Hayden
T5 MD Jairo Kaufman
DAYSI Miller
Yo Riley, Resident
Atsumi Kimura - Resident
MD Jairo Kaufman

## 2024-06-09 NOTE — PROGRESS NOTE ADULT - SUBJECTIVE AND OBJECTIVE BOX
Marcial Erickson MD  PGY 1 Department of Internal Medicine        Patient is a 73y old  Female who presents with a chief complaint of Syncope (08 Jun 2024 11:28)      SUBJECTIVE / OVERNIGHT EVENTS: Pt seen and examined. No acute overnight events. Denies fevers, chills, CP, SOB, Abdominal pain, N/V, Constipation, Diarrhea        MEDICATIONS  (STANDING):  ammonium lactate 12% Lotion 1 Application(s) Topical <User Schedule>  apixaban 5 milliGRAM(s) Oral two times a day  aspirin  chewable 81 milliGRAM(s) Oral daily  atorvastatin 80 milliGRAM(s) Oral at bedtime  calamine/zinc oxide Lotion 1 Application(s) Topical three times a day  dextrose 10% Bolus 125 milliLiter(s) IV Bolus once  dextrose 5%. 1000 milliLiter(s) (100 mL/Hr) IV Continuous <Continuous>  dextrose 5%. 1000 milliLiter(s) (50 mL/Hr) IV Continuous <Continuous>  dextrose 50% Injectable 25 Gram(s) IV Push once  dextrose 50% Injectable 12.5 Gram(s) IV Push once  droxidopa 600 milliGRAM(s) Oral three times a day  ferrous    sulfate 325 milliGRAM(s) Oral two times a day  fludroCORTISONE 0.1 milliGRAM(s) Oral daily  glucagon  Injectable 1 milliGRAM(s) IntraMuscular once  insulin glargine Injectable (LANTUS) 12 Unit(s) SubCutaneous at bedtime  insulin lispro (ADMELOG) corrective regimen sliding scale   SubCutaneous three times a day before meals  insulin lispro (ADMELOG) corrective regimen sliding scale   SubCutaneous <User Schedule>  insulin lispro Injectable (ADMELOG) 4 Unit(s) SubCutaneous three times a day before meals  levothyroxine 75 MICROGram(s) Oral daily  midodrine. 30 milliGRAM(s) Oral <User Schedule>  midodrine. 20 milliGRAM(s) Oral <User Schedule>  Nephro-molly 1 Tablet(s) Oral daily  pantoprazole    Tablet 40 milliGRAM(s) Oral every 24 hours  psyllium Powder 1 Packet(s) Oral three times a day  pyridostigmine 30 milliGRAM(s) Oral <User Schedule>  triamcinolone 0.1% Ointment 1 Application(s) Topical two times a day    MEDICATIONS  (PRN):  dextrose Oral Gel 15 Gram(s) Oral once PRN Blood Glucose LESS THAN 70 milliGRAM(s)/deciliter      I&O's Summary    08 Jun 2024 07:01  -  09 Jun 2024 07:00  --------------------------------------------------------  IN: 560 mL / OUT: 2400 mL / NET: -1840 mL        Vital Signs Last 24 Hrs  T(C): 36.6 (09 Jun 2024 04:52), Max: 36.7 (08 Jun 2024 20:18)  T(F): 97.8 (09 Jun 2024 04:52), Max: 98 (08 Jun 2024 20:18)  HR: 77 (09 Jun 2024 04:52) (66 - 88)  BP: 128/65 (09 Jun 2024 04:52) (126/72 - 157/69)  BP(mean): --  RR: 18 (09 Jun 2024 04:52) (18 - 18)  SpO2: 94% (09 Jun 2024 04:52) (94% - 98%)    Parameters below as of 09 Jun 2024 04:52  Patient On (Oxygen Delivery Method): room air        CAPILLARY BLOOD GLUCOSE      POCT Blood Glucose.: 243 mg/dL (09 Jun 2024 02:45)  POCT Blood Glucose.: 140 mg/dL (09 Jun 2024 00:12)  POCT Blood Glucose.: 90 mg/dL (08 Jun 2024 22:38)  POCT Blood Glucose.: 68 mg/dL (08 Jun 2024 22:17)  POCT Blood Glucose.: 53 mg/dL (08 Jun 2024 21:51)  POCT Blood Glucose.: 54 mg/dL (08 Jun 2024 21:49)  POCT Blood Glucose.: 86 mg/dL (08 Jun 2024 17:08)  POCT Blood Glucose.: 99 mg/dL (08 Jun 2024 12:57)  POCT Blood Glucose.: 76 mg/dL (08 Jun 2024 12:50)  POCT Blood Glucose.: 111 mg/dL (08 Jun 2024 08:26)      PHYSICAL EXAM:  GENERAL: NAD, lying in bed comfortably.  HEAD:  Atraumatic, normocephalic.   NECK: Supple.   CHEST/LUNG: CTAB. No rales, rhonchi, wheezing, or rubs. Unlabored respirations.  HEART: RRR, no M/R/G, normal S1/S2.  ABDOMEN: Soft, obese, nontender, nondistended, no guarding.   EXTREMITIES:  2+ peripheral pulses b/l. legs in dressings with 3+ BLE edema  NEURO:  AAOx3, no focal deficits.   SKIN: No rashes or lesions.  PSYCH: Normal mood, affect.       LABS:                        10.5   7.12  )-----------( 166      ( 07 Jun 2024 07:19 )             34.1     Auto Eosinophil # x     / Auto Eosinophil % x     / Auto Neutrophil # x     / Auto Neutrophil % x     / BANDS % x        06-07    139  |  107  |  24<H>  ----------------------------<  127<H>  3.9   |  20<L>  |  1.61<H>    Ca    8.4      07 Jun 2024 07:19  Mg     2.0     06-07  Phos  3.3     06-07  TPro  5.6<L>  /  Alb  2.3<L>  /  TBili  0.3  /  DBili  x   /  AST  20  /  ALT  8<L>  /  AlkPhos  91  06-07          Urinalysis Basic - ( 07 Jun 2024 07:19 )    Color: x / Appearance: x / SG: x / pH: x  Gluc: 127 mg/dL / Ketone: x  / Bili: x / Urobili: x   Blood: x / Protein: x / Nitrite: x   Leuk Esterase: x / RBC: x / WBC x   Sq Epi: x / Non Sq Epi: x / Bacteria: x            RADIOLOGY & ADDITIONAL TESTS:    Imaging Personally Reviewed:    Consultant(s) Notes Reviewed:      Care Discussed with Consultants/Other Providers:   Marcial Erickson MD  PGY 1 Department of Internal Medicine        Patient is a 73y old  Female who presents with a chief complaint of Syncope (08 Jun 2024 11:28)      SUBJECTIVE / OVERNIGHT EVENTS: Pt seen and examined. No acute overnight events. She has no acute complaints at this time. Denies CP, SOB, Abdominal pain, N/V, Constipation, Diarrhea        MEDICATIONS  (STANDING):  ammonium lactate 12% Lotion 1 Application(s) Topical <User Schedule>  apixaban 5 milliGRAM(s) Oral two times a day  aspirin  chewable 81 milliGRAM(s) Oral daily  atorvastatin 80 milliGRAM(s) Oral at bedtime  calamine/zinc oxide Lotion 1 Application(s) Topical three times a day  dextrose 10% Bolus 125 milliLiter(s) IV Bolus once  dextrose 5%. 1000 milliLiter(s) (100 mL/Hr) IV Continuous <Continuous>  dextrose 5%. 1000 milliLiter(s) (50 mL/Hr) IV Continuous <Continuous>  dextrose 50% Injectable 25 Gram(s) IV Push once  dextrose 50% Injectable 12.5 Gram(s) IV Push once  droxidopa 600 milliGRAM(s) Oral three times a day  ferrous    sulfate 325 milliGRAM(s) Oral two times a day  fludroCORTISONE 0.1 milliGRAM(s) Oral daily  glucagon  Injectable 1 milliGRAM(s) IntraMuscular once  insulin glargine Injectable (LANTUS) 12 Unit(s) SubCutaneous at bedtime  insulin lispro (ADMELOG) corrective regimen sliding scale   SubCutaneous three times a day before meals  insulin lispro (ADMELOG) corrective regimen sliding scale   SubCutaneous <User Schedule>  insulin lispro Injectable (ADMELOG) 4 Unit(s) SubCutaneous three times a day before meals  levothyroxine 75 MICROGram(s) Oral daily  midodrine. 30 milliGRAM(s) Oral <User Schedule>  midodrine. 20 milliGRAM(s) Oral <User Schedule>  Nephro-molly 1 Tablet(s) Oral daily  pantoprazole    Tablet 40 milliGRAM(s) Oral every 24 hours  psyllium Powder 1 Packet(s) Oral three times a day  pyridostigmine 30 milliGRAM(s) Oral <User Schedule>  triamcinolone 0.1% Ointment 1 Application(s) Topical two times a day    MEDICATIONS  (PRN):  dextrose Oral Gel 15 Gram(s) Oral once PRN Blood Glucose LESS THAN 70 milliGRAM(s)/deciliter      I&O's Summary    08 Jun 2024 07:01  -  09 Jun 2024 07:00  --------------------------------------------------------  IN: 560 mL / OUT: 2400 mL / NET: -1840 mL        Vital Signs Last 24 Hrs  T(C): 36.6 (09 Jun 2024 04:52), Max: 36.7 (08 Jun 2024 20:18)  T(F): 97.8 (09 Jun 2024 04:52), Max: 98 (08 Jun 2024 20:18)  HR: 77 (09 Jun 2024 04:52) (66 - 88)  BP: 128/65 (09 Jun 2024 04:52) (126/72 - 157/69)  BP(mean): --  RR: 18 (09 Jun 2024 04:52) (18 - 18)  SpO2: 94% (09 Jun 2024 04:52) (94% - 98%)    Parameters below as of 09 Jun 2024 04:52  Patient On (Oxygen Delivery Method): room air        CAPILLARY BLOOD GLUCOSE      POCT Blood Glucose.: 243 mg/dL (09 Jun 2024 02:45)  POCT Blood Glucose.: 140 mg/dL (09 Jun 2024 00:12)  POCT Blood Glucose.: 90 mg/dL (08 Jun 2024 22:38)  POCT Blood Glucose.: 68 mg/dL (08 Jun 2024 22:17)  POCT Blood Glucose.: 53 mg/dL (08 Jun 2024 21:51)  POCT Blood Glucose.: 54 mg/dL (08 Jun 2024 21:49)  POCT Blood Glucose.: 86 mg/dL (08 Jun 2024 17:08)  POCT Blood Glucose.: 99 mg/dL (08 Jun 2024 12:57)  POCT Blood Glucose.: 76 mg/dL (08 Jun 2024 12:50)  POCT Blood Glucose.: 111 mg/dL (08 Jun 2024 08:26)      PHYSICAL EXAM:  GENERAL: NAD, lying in bed comfortably.  HEAD:  Atraumatic, normocephalic.   NECK: Supple.   CHEST/LUNG: CTAB. No rales, rhonchi, wheezing, or rubs. Unlabored respirations.  HEART: RRR, no M/R/G, normal S1/S2.  ABDOMEN: Soft, obese, nontender, nondistended, no guarding.   EXTREMITIES:  2+ peripheral pulses b/l. 3+ BLE edema. legs in compression dressings   NEURO:  AAOx3, no focal deficits.   SKIN: No rashes or lesions.  PSYCH: Normal mood, affect.       LABS:                        10.5   7.12  )-----------( 166      ( 07 Jun 2024 07:19 )             34.1     Auto Eosinophil # x     / Auto Eosinophil % x     / Auto Neutrophil # x     / Auto Neutrophil % x     / BANDS % x        06-07    139  |  107  |  24<H>  ----------------------------<  127<H>  3.9   |  20<L>  |  1.61<H>    Ca    8.4      07 Jun 2024 07:19  Mg     2.0     06-07  Phos  3.3     06-07  TPro  5.6<L>  /  Alb  2.3<L>  /  TBili  0.3  /  DBili  x   /  AST  20  /  ALT  8<L>  /  AlkPhos  91  06-07          Urinalysis Basic - ( 07 Jun 2024 07:19 )    Color: x / Appearance: x / SG: x / pH: x  Gluc: 127 mg/dL / Ketone: x  / Bili: x / Urobili: x   Blood: x / Protein: x / Nitrite: x   Leuk Esterase: x / RBC: x / WBC x   Sq Epi: x / Non Sq Epi: x / Bacteria: x            RADIOLOGY & ADDITIONAL TESTS:    Imaging Personally Reviewed:    Consultant(s) Notes Reviewed:      Care Discussed with Consultants/Other Providers:

## 2024-06-09 NOTE — PROGRESS NOTE ADULT - ASSESSMENT
73F w HFrEF (EF 30%), mod AS, known LBBB, HTN, IDDM1, CKD, hypothyroidism, chronic lymphedema, p/w 1 episode of syncope with R arm jerking.     #Syncope-Aortic Stenosis  - AS in setting of decreased LVEF  - s/p tavr on 4/24 c/b VT -> shock -> VFib -> shock  - hypoxic/congested, and was intubated, extubated on 4/25  - on 4/25 with worsening bradycardia, s/p TVP placement followed by AV Micra placement   - Remains in Sinus Rhythm/known lbbb with intermittent   - on high dose midodrine, droxidopa and florinef in the setting of orthostasis.  - TTE on 5/21 with global LV dysfunction severely decreased. Well seated TAVR, limited echo 6/4 with unchanged mod lv and no sig changes overall  - to consider CRT-D in the future  - Cannot initiate GDMT due to orthostasis  - would try to mobilize as best as possible and monitor orthostatics, hopefully standing bp can be kept >100.   - Still orthostatic, down to 80's yesterday  - cont asa and statin  - Hgb stable     #Chronic Systolic HF (EF 30%), mod to severe AS, HTN, LBBB, Lymphedema   - Has known systolic HF and AS.    - Repeat TTE showed EF 30%, mild-mod LVH, mildly reduced RVF, mod-severe AS (.61 cmsq), mod pHTN  - Repeat limited study with normal function TAVR  - On home Torsemide 20 mg daily, Eplerenone 25 mg daily, and Toprol  mg daily, all currently on hold  - On Midodrine 20mg TID for orthostatic hypotension  - Droxidopa now initiated as well for significant orthostasis upto 600mg TID  - Unable to initiate GDMT at this time due to significant orthostatic hypotension  - initially CVVHD, then HD  - HD now on hold, as she is urinating and creatinine remains stable,   - Creatinine improving  - prn iv bumex  - renal fu noted  - Diagnosed with UE DVT and now on full loading dose of Eliquis  - PT and Bed to chair as much as possible    #Orthostatic hypotension.   - Likely in setting of deconditioning.   - would attempt orthostatics daily.  Hoping to wean off midodrine in order to restart GDMT. Orthostatics improving from supine to seated, but still problematic with standing. Remains orthostatic and symptomatic on standing  - Repeat othostatics-improved-Florinef decreased 2/2 increased edema, midodrine increased to 30mgBID 20mg OD as still symptomatic with standing  - Overall symptoms are markedly improving. she sat in thee chair x 6 hrs and stood 3 times on 6/7  -On Pyridostigmine 50md TID  -Repeat orthostatice and OOB     # Atrial Fibrillation  - Reported episode over the weekend (6/1-6/2)  - Is on AC for DVT  - Will need outpatient monitoring  - Continue telemetry    #UTI  - Abx as per ID

## 2024-06-09 NOTE — PROVIDER CONTACT NOTE (OTHER) - REASON
no hardy order for active hardy
Large maroon colored stool
Patient refused bilateral lower leg dressing changes
Pt blood glucose 105. 22 units of lantus held.
Hypotension BP 80/40
Pt due for 14units of lantus but initial f/s of 76
High residual on bladder scan post-void attempt 458mL
Patient with episodes of 2:1 heart block.
dizziness weakness while ambulating
hold 8AM lantus per resident order
BP-92/56,scheduled for Bumex IVP as well as gtt
Michael down to low as 38 during episode of 2:1 AVB, asymptomatic.
Pt vomiting brown liquid
Pt hypoglycemic
Rectal Temp-102.7F
UO 100mL/hr since 4/20 2200.
?? Junctional rhythm on tele.
Patient bradycardic to 36 with 2:1 AVB sustaining in the 30's while asleep.
Patient going from Sinus 1st degree  to second degree  heart block type 1, heart rate low as 44.
Vomitting
call received from lab for unstable specimen

## 2024-06-09 NOTE — PROVIDER CONTACT NOTE (OTHER) - SITUATION
?? Junctional rhythm on tele., possible intermittent AV dissociation convert to SR 1st degree as per tele tech while justo in the 50's.
Late note entry   Patient mostly sinus with HR 50-60s, with episodes of 2nd degree type 1 and 2:1 heart block, with heart rate as low as 40 briefly.
Michael down to low as 38 during episode of 2:1 AVB, asymptomatic.
Rectal Temp-102.7F
High residual on bladder scan post-void attempt
Pt's blood glucose reading was 76 at initial fingerstick.  Pt given two boxes of apple juice.  Blood glucose 115 after repeat fingerstick.
BP-92/56,scheduled for Bumex IVP as well as gtt
Large maroon colored stool
Resident Jairo Kaufman reached out to "hold AM Lantus for the time being."
pt ambulating with PT Chaperon, assisted by EDEN Gore and EDEN Gonzalez. pt s/p TAVR and micra ppm on CRRT. follow up PT session.
Patient bradycardic to 36 with 2:1 AVB sustaining in the 30's while asleep.
Pt hypoglycemic- F/S 53
Pt vomiting and feeling nausea due to UTI as per pt stated
pt was said in report to have hardy catheter for retention. no active order, pt at dialysis during report. waited for pt to return to confirm hardy in place before verifying no order
Patient going from Sinus 1st degree  to second degree  heart block type 1, heart rate low as 44.
Pt bedtime fingerstick 105. 22 unites of Lantus held.
Patient refused bilateral lower leg dressing changes
Pt vomiting brown liquid
call received from lab that she needed to cancel ARR and Renin blood sent due to unstable specimen. Specimen is suppose to be sent frozen but was sent room temp
Hypotension BP 80/40 manually
UO 100mL/hr since 4/20 2200.

## 2024-06-09 NOTE — PROGRESS NOTE ADULT - SUBJECTIVE AND OBJECTIVE BOX
INTERVAL HPI/OVERNIGHT EVENTS:  No new overnight event.  No N/V/D.  Tolerating diet.  no new labs               MEDICATIONS  (STANDING):  ammonium lactate 12% Lotion 1 Application(s) Topical <User Schedule>  apixaban 5 milliGRAM(s) Oral two times a day  aspirin  chewable 81 milliGRAM(s) Oral daily  atorvastatin 80 milliGRAM(s) Oral at bedtime  calamine/zinc oxide Lotion 1 Application(s) Topical three times a day  dextrose 10% Bolus 125 milliLiter(s) IV Bolus once  dextrose 5%. 1000 milliLiter(s) (100 mL/Hr) IV Continuous <Continuous>  dextrose 5%. 1000 milliLiter(s) (50 mL/Hr) IV Continuous <Continuous>  dextrose 50% Injectable 25 Gram(s) IV Push once  dextrose 50% Injectable 12.5 Gram(s) IV Push once  droxidopa 600 milliGRAM(s) Oral three times a day  ferrous    sulfate 325 milliGRAM(s) Oral two times a day  fludroCORTISONE 0.1 milliGRAM(s) Oral daily  glucagon  Injectable 1 milliGRAM(s) IntraMuscular once  insulin glargine Injectable (LANTUS) 12 Unit(s) SubCutaneous once  insulin lispro (ADMELOG) corrective regimen sliding scale   SubCutaneous three times a day before meals  insulin lispro (ADMELOG) corrective regimen sliding scale   SubCutaneous <User Schedule>  insulin lispro Injectable (ADMELOG) 3 Unit(s) SubCutaneous three times a day before meals  levothyroxine 75 MICROGram(s) Oral daily  midodrine. 30 milliGRAM(s) Oral <User Schedule>  midodrine. 20 milliGRAM(s) Oral <User Schedule>  Nephro-molly 1 Tablet(s) Oral daily  pantoprazole    Tablet 40 milliGRAM(s) Oral every 24 hours  psyllium Powder 1 Packet(s) Oral three times a day  pyridostigmine 30 milliGRAM(s) Oral <User Schedule>  triamcinolone 0.1% Ointment 1 Application(s) Topical two times a day    MEDICATIONS  (PRN):  dextrose Oral Gel 15 Gram(s) Oral once PRN Blood Glucose LESS THAN 70 milliGRAM(s)/deciliter      Allergies    contrast media (iodine-based) (Fever; Vomiting; Flushing; Hypotension; Rash; Stomach Upset)  Ativan (Rash; Urticaria)  penicillins (Hives (Mod to Severe); Anaphylaxis (Mod to Severe); Short breath (Mod to Severe); Angioedema (Mod to Severe))    Intolerances            General:  No wt loss, fevers, chills, night sweats, fatigue,   Eyes:  Good vision, no reported pain  ENT:  No sore throat, pain, runny nose, dysphagia  CV:  No pain, palpitations, hypo/hypertension  Resp:  No dyspnea, cough, tachypnea, wheezing  GI:  No pain, No nausea, No vomiting, No diarrhea, No constipation, No weight loss, No fever, No pruritis, No rectal bleeding, No tarry stools, No dysphagia,  :  No pain, bleeding, incontinence, nocturia  Muscle:  No pain, weakness  Neuro:  No weakness, tingling, memory problems  Psych:  No fatigue, insomnia, mood problems, depression  Endocrine:  No polyuria, polydipsia, cold/heat intolerance  Heme:  No petechiae, ecchymosis, easy bruisability  Skin:  No rash, tattoos, scars, edema        PHYSICAL EXAM  Vital Signs Last 24 Hrs  T(C): 36.6 (09 Jun 2024 04:52), Max: 36.7 (08 Jun 2024 20:18)  T(F): 97.8 (09 Jun 2024 04:52), Max: 98 (08 Jun 2024 20:18)  HR: 77 (09 Jun 2024 04:52) (66 - 88)  BP: 128/65 (09 Jun 2024 04:52) (126/72 - 157/69)  BP(mean): --  RR: 18 (09 Jun 2024 04:52) (18 - 18)  SpO2: 94% (09 Jun 2024 04:52) (94% - 98%)    Parameters below as of 09 Jun 2024 04:52  Patient On (Oxygen Delivery Method): room air        06-08-24 @ 07:01  -  06-09-24 @ 07:00  --------------------------------------------------------  IN: 560 mL / OUT: 2400 mL / NET: -1840 mL            GENERAL:  Appears stated age, well-groomed, well-nourished, no distress  HEENT:  NC/AT,  conjunctivae clear and pink, no thyromegaly, nodules, adenopathy, no JVD, sclera -anicteric  CHEST:  Full & symmetric excursion, no increased effort, breath sounds clear  HEART:  Regular rhythm, S1, S2, no murmur/rub/S3/S4, no abdominal bruit, no edema  ABDOMEN:  Soft, non-tender, non-distended, normoactive bowel sounds,  no masses ,no hepato-splenomegaly, no signs of chronic liver disease  EXTEREMITIES:  no cyanosis,clubbing or edema  SKIN:  No rash/erythema/ecchymoses/petechiae/wounds/abscess/warm/dry  NEURO:  Alert, oriented, no asterixis, no tremor, no encephalopathy          LABS:            mL          amylase   lipase  RADIOLOGY & ADDITIONAL TESTS:

## 2024-06-09 NOTE — PROVIDER CONTACT NOTE (OTHER) - RECOMMENDATIONS
Provider notification. Review orders, labs, & medications
Reassess appropriateness of purewick, straight cath.
place order to RN to hold AM lantus.
Notify provider. Urine labs?
Pt received zofran this am.
redraw in the am
Extra dose of midodrine to be given
Provider notification. Follow hypoglycemia protocol.
type and screen and occult stool
Zofran
continue to monitor.
notify provider, review orders and meds

## 2024-06-09 NOTE — PROGRESS NOTE ADULT - TIME BILLING
- Review of records, telemetry, vital signs and daily labs.   - General and cardiovascular physical examination.  - Generation of cardiovascular treatment plan.  - Coordination of care.      Patient was seen and examined by me on  06/09/2024,interim events noted,labs and radiology studies reviewed.  Cuco Tubbs MD,FACC.  [address] [place], [place] [id number].  [phone]

## 2024-06-09 NOTE — PROVIDER CONTACT NOTE (OTHER) - BACKGROUND
Patient admitted for syncope and collapse
Patient admitted for syncope and collapse with R arm jerking
73 Y F admitted for syncope
Patient admitted with syncope and LOC.
Patient admitted with syncope,CKD.
pt reported feeling dizzy and appeared anxious. reclining chair unable to fit behind patient and pt legs buckled. pt safely lowered to floor. unable to stand up on own. hoyered to bed.
73F PMH: HFrEF (EF 30%), AS, LBBB, HTN, DM1, CKD, hypothyroidism, chronic lymphedema, LELIA
Dx - syncope with right arm jerking.   Hx - HFrEF, mod AS, DM1, CKD, lymphedema, psoriasis.
Patient admitted with syncope
73Y F admitted for syncope & collapse
Patient admitted for syncope and collapse
Patient admittedw ith syncope,CKd.
Pt admitted for syncope. PMH b/l LE lymphedema
Pt currently admitted for ortho hypotension. Hx of DM. 14units of lantus due.
Patient with Hx urinary retention attempted to void, bladder scan revealed 458 mL after attempt.  Hx of CKD, CA, severe orthostatic hypotension positive.
dx syncope 2/2 severe AS. DKA 5/14 400s range s/p fluids.
Patient admitted with syncope with LOC.
73 y F DUNCAN p/w 1 episode of syncope with R arm jerking.
73F PMH: HFrEF (EF 30%), AS, LBBB, HTN, DM1, CKD, hypothyroidism, chronic lymphedema, LELIA (3L home O2)
Patient admitted with syncope with LOC,
Pt with N/V since last night. Pt had BM this am. Pt ate last yesterday evening.

## 2024-06-09 NOTE — PROVIDER CONTACT NOTE (OTHER) - DATE AND TIME:
11-May-2024 21:14
15-May-2024 06:51
19-Apr-2024 15:15
09-May-2024 01:55
12-Apr-2024 22:41
14-Apr-2024 04:46
14-Apr-2024 06:10
22-May-2024 13:22
09-May-2024 09:45
18-Apr-2024 03:25
07-Jun-2024 22:50
08-Jun-2024 22:57
18-Apr-2024 22:29
21-Apr-2024 06:28
24-May-2024 10:10
13-Apr-2024 18:37
17-Apr-2024 20:12
02-May-2024 12:10
15-Apr-2024 22:44
24-May-2024 21:55
28-May-2024 20:46

## 2024-06-09 NOTE — PROVIDER CONTACT NOTE (OTHER) - ACTION/TREATMENT ORDERED:
MD notified, no changes in plan of care at this time.
Okay to give same dose of lantus.
12 Lead EKG, will continue to monitor
Provider notified and aware. No interventions ordered at this time. Will relay information to oncoming day nurse.
Provider will put in specific orders
provider notified. cont hourly rounds, maintain pt safety and needs as needed.
will continue to monitor
12 Lead EKG
Provider notified. Midodrine 5mg + 10mg stat dose given as per order.
Blood cx x2, RVP, Tylenol 1gm IV x1,, labs as ordered, CXRand Abdominal XRay portable as ordered
MD made aware.
will continue to monitor
MD garcia.
MD made aware. MD to place orders
Provider notified . Zofran ordered and given. No further actions being done at this time
Provider notified and aware. 22 units of Lantus discontinued. New order of 10 units of Lantus ordered and administered. Patient care and safety continues.
continue to monitor vital signs.
Hold Bumex for now.
Provider notified & aware. Cookies & juice were given to patient. F/S was checked q 15 minutes. Pt sugar improved to 140. Pt denies symptoms. Patient safety & care continues.
Straight cath

## 2024-06-09 NOTE — PROGRESS NOTE ADULT - SUBJECTIVE AND OBJECTIVE BOX
MR#[id number]  PATIENT NAME:JOHNATHAN LOPEZ    DATE OF SERVICE: 06-09-24 @ 07:17  Patient was seen and examined by Cuco Tubbs MD on    06-09-24 @ 07:17 .  Interim events noted.Consultant notes ,Labs,Telemetry reviewed by me       HOSPITAL COURSE: HPI:  73F w HFrEF (EF 30%), mod AS, known LBBB, HTN, IDDM1, CKD, hypothyroidism, chronic lymphedema, suspected OHS/LELIA on 3L NC home O2 p/w 1 episode of syncope. Recent admission at [facility] (3/29-4/5) for ADHF and DUNCAN s/p diuresis, discharged with new home O2 3L NC suspecting OHS/LELIA pending outpatient w/u. Since discharge a week ago, she has been staying at home with   Pt had sob walking to car. Once in car, she was still breathing heavily. Partner noticed pt was not responding to verbal stimuli for 10-15sec and witnessed R arm jerking. Pt gained consciousness quickly without postictal symptoms. Pt went to Cardiologist Dr. Nathan who recommended pt to come to ED. No fever, cp, abd pain, n/v. Leg swelling is improved from prior. Pt on torsemide 40mg which she has been taking. Pt was discharged on 3LNC which she is currently on. Patient reports she did not take Farxiga that she was discharged on as she was concerned about side effects.     In ED, patient was found afebrile, hemodynamically stable, breathing well on 3L NC. Initial labs notable for mild hyponatremia, DUNCAN on CKD, elevated proBNP and elevated pCO2 both improved from prior. (12 Apr 2024 16:31)      INTERIM EVENTS:Patient seen at bedside ,interim events noted.  s/p TAVR 4/24 c/b by VT and Vfib requiring shock and flash pulmonary edema requiring intubation.   Course complicated by contrast induced allergic reaction (had CTA for TAVR eval) and contrast related renal failure (acute on chronic) requiring CRRT for fluid removal and pressors for vasoplegia and hypovolemia due to CRRT.   Patient now extubated, off pressors, CRRT and downgraded to medicine floors.    Patient had maroon-colored stool on 5/24, positive for blood.  Hemodynamically stable with stable hemoglobin.  RESOLVED  6/1-Awake was sitting yesterday still orthostatic-had episode tachycardia overnight-Atrial tachy/Fib  6/7 Awake is able to sit OOB and stand less dizzy  6/8-Lying in bed has not been OOB  6/9-Awake -      PMH -reviewed admission note, no change since admission    MEDICATIONS  (STANDING):  ammonium lactate 12% Lotion 1 Application(s) Topical <User Schedule>  apixaban 5 milliGRAM(s) Oral two times a day  aspirin  chewable 81 milliGRAM(s) Oral daily  atorvastatin 80 milliGRAM(s) Oral at bedtime  calamine/zinc oxide Lotion 1 Application(s) Topical three times a day  droxidopa 600 milliGRAM(s) Oral three times a day  ferrous    sulfate 325 milliGRAM(s) Oral two times a day  fludroCORTISONE 0.1 milliGRAM(s) Oral daily  glucagon  Injectable 1 milliGRAM(s) IntraMuscular once  insulin glargine Injectable (LANTUS) 12 Unit(s) SubCutaneous at bedtime  insulin lispro (ADMELOG) corrective regimen sliding scale   SubCutaneous three times a day before meals  insulin lispro (ADMELOG) corrective regimen sliding scale   SubCutaneous <User Schedule>  insulin lispro Injectable (ADMELOG) 4 Unit(s) SubCutaneous three times a day before meals  levothyroxine 75 MICROGram(s) Oral daily  midodrine. 30 milliGRAM(s) Oral <User Schedule>  midodrine. 20 milliGRAM(s) Oral <User Schedule>  Nephro-molly 1 Tablet(s) Oral daily  pantoprazole    Tablet 40 milliGRAM(s) Oral every 24 hours  psyllium Powder 1 Packet(s) Oral three times a day  pyridostigmine 30 milliGRAM(s) Oral <User Schedule>  triamcinolone 0.1% Ointment 1 Application(s) Topical two times a day    MEDICATIONS  (PRN):  dextrose Oral Gel 15 Gram(s) Oral once PRN Blood Glucose LESS THAN 70 milliGRAM(s)/deciliter            REVIEW OF SYSTEMS:  Constitutional: [ ] fever, [ ]weight loss,  [ ]fatigue [ ]weight gain  Eyes: [ ] visual changes  Respiratory: [ ]shortness of breath;  [ ] cough, [ ]wheezing, [ ]chills, [ ]hemoptysis  Cardiovascular: [ ] chest pain, [ ]palpitations, [ ]dizziness,  [ ]leg swelling[ ]orthopnea[ ]PND  Gastrointestinal: [ ] abdominal pain, [ ]nausea, [ ]vomiting,  [ ]diarrhea [ ]Constipation [ ]Melena  Genitourinary: [ ] dysuria, [ ] hematuria [ ][place]  Neurologic: [ ] headaches [ ] tremors[ ]weakness [ ]Paralysis Right[ ] Left[ ]  Skin: [ ] itching, [ ]burning, [ ] rashes  Endocrine: [ ] heat or cold intolerance  Musculoskeletal: [ ] joint pain or swelling; [ ] muscle, back, or extremity pain  Psychiatric: [ ] depression, [ ]anxiety, [ ]mood swings, or [ ]difficulty sleeping  Hematologic: [ ] easy bruising, [ ] bleeding gums    [ ] All remaining systems negative except as per above.   [ ]Unable to obtain.  [x] No change in ROS since admission      Vital Signs Last 24 Hrs  T(C): 36.6 (09 Jun 2024 04:52), Max: 36.7 (08 Jun 2024 20:18)  T(F): 97.8 (09 Jun 2024 04:52), Max: 98 (08 Jun 2024 20:18)  HR: 77 (09 Jun 2024 04:52) (66 - 88)  BP: 128/65 (09 Jun 2024 04:52) (126/72 - 157/69)  RR: 18 (09 Jun 2024 04:52) (18 - 18)  SpO2: 94% (09 Jun 2024 04:52) (94% - 98%)    Parameters below as of 09 Jun 2024 04:52  Patient On (Oxygen Delivery Method): room air      I&O's Summary    08 Jun 2024 07:01  -  09 Jun 2024 07:00  --------------------------------------------------------  IN: 560 mL / OUT: 2400 mL / NET: -1840 mL        PHYSICAL EXAM:  General: No acute distress BMI-34  HEENT: EOMI, PERRL  Neck: Supple, [ ] JVD  Lungs: Equal air entry bilaterally; [ ] rales [ ] wheezing [ ] rhonchi  Heart: Regular rate and rhythm; [x ] murmur   2/6 [ x] systolic [ ] diastolic [ ] radiation[ ] rubs [ ]  gallops  Abdomen: Nontender, bowel sounds present  Extremities: No clubbing, cyanosis, [xx] edema [ ]Pulses  equal and intact  Nervous system:  Alert & Oriented X3, no focal deficits  Psychiatric: Normal affect  Skin: No rashes or lesions    LABS:  06-07    139  |  107  |  24<H>  ----------------------------<  127<H>  3.9   |  20<L>  |  1.61<H>    Ca    8.4      07 Jun 2024 07:19  Phos  3.3     06-07  Mg     2.0     06-07    TPro  5.6<L>  /  Alb  2.3<L>  /  TBili  0.3  /  DBili  x   /  AST  20  /  ALT  8<L>  /  AlkPhos  91  06-07    Creatinine Trend: 1.61<--, 2.06<--, 1.77<--, 1.95<--, 2.50<--, 2.39<--                        10.5   7.12  )-----------( 166      ( 07 Jun 2024 07:19 )             34.1           TTE 6/4/24:  CONCLUSIONS:     1. Left ventricular systolic function is moderately decreased with an ejection fraction visually estimated at 35 to 40 %.   2. Multiple segmental abnormalities exist. See findings.   3. Compared to the transthoracic echocardiogram performed on 5/21/2024, there have been no significant intervalchanges.   4. Estimated pulmonary artery systolic pressure is 39 mmHg.   5. The inferior vena cava is normal in size (normal <2.1cm) with normal inspiratory collapse (normal >50%) consistent with normal right atrial pressure (~3, range 0-5mmHg).  Aortic Valve:  A a transcatheter deployed (TAVR)is present in the aortic position. The peak transaortic velocity is 2.47 m/s, peak transaortic gradient is 24.4 mmHg and mean transaortic gradient is 13.0 mmHg with an LVOT/aortic valve VTI ratio of 0.32.

## 2024-06-09 NOTE — PROGRESS NOTE ADULT - PROBLEM SELECTOR PLAN 1
Likely in setting of deconditioning.  Improving with fluids.  Will attempt orthostatics daily.  Hoping to wean off midodrine in order to restart GDMT.  Orthostatics improving from supine to seated, but still problematic with standing.    - leg compression, will attempt abdominal binder however difficult due to body habitus   - OOB as tolerated   - midodrine TID (30mg, 30mg, 20mg).  - continue droxydopa 600mg TID  - decreased fludrocortisone to 0.1mg daily  - Continue to evaluate standing orthostatics daily  - continue pyridostigmine 30mg tid

## 2024-06-09 NOTE — CHART NOTE - NSCHARTNOTEFT_GEN_A_CORE
Endocrine following for diabetes management. Pt. had hypoglycemia last night at bedtime. Lantus was held resulting in severe hyperglycemia this morning. Please give STAT dose of Lantus 12 units now (dose was lowered from 14 units given tight control yesterday). Dose Lantus 12 units daily in the morning starting tomorrow 6/10. Empirically decrease Admelog to 3 units qac. Will continue to follow. Further adjustments to be made based on response to these dose adjustments in 24 hours.      Tay Ross D.O  [phone] Endocrine following for diabetes management. Pt. had hypoglycemia last night at bedtime. Lantus was held resulting in severe hyperglycemia this morning. Please give STAT dose of Lantus 12 units now (dose was lowered from 14 units given tight control yesterday). Dose Lantus 12 units daily in the morning starting tomorrow 6/10. Empirically decrease Admelog to 3 units at dinner. Can resume Admelog 4 units with breakfast and lunch qac. Will continue to follow. Further adjustments to be made based on response to these dose adjustments in 24 hours.      Tay Ross D.O  [phone]

## 2024-06-09 NOTE — PROVIDER CONTACT NOTE (OTHER) - ASSESSMENT
Pt is A&Ox4, on RA. Denies lightheadedness, dizziness, pain, sob, or any other discomforts. Pt says she ate all her meals.

## 2024-06-09 NOTE — PROGRESS NOTE ADULT - PROBLEM SELECTOR PLAN 5
Home regimen: lantus 33u qhs w/ premeal sliding scale. A1c 7.4% in 3/2024. multiple hypoglycemic episodes, DKA now resolved, anion gap closed.  Close discussions with endocrine ongoing regarding management.  Hypoglycemic in AM several times now since discharge from MICU.  Appears to have more controlled blood sugars  - endo following, appreciate recs   - dosing basal and premeal insulin per endocrine recs  - c/w low ISS  - premeal and bedtime fingersticks  - hypoglycemia protocol. Home regimen: lantus 33u qhs w/ premeal sliding scale. A1c 7.4% in 3/2024. multiple hypoglycemic episodes, DKA now resolved, anion gap closed.  Close discussions with endocrine ongoing regarding management.  Hypoglycemic in AM several times now since discharge from MICU.  Appears to have more controlled blood sugars  - endo following, appreciate recs   - dosing basal and premeal insulin per endocrine recs  -overnight 6/8 lantus held -> will continue basal insulin in AM  - c/w low ISS  - premeal and bedtime fingersticks  - hypoglycemia protocol.

## 2024-06-10 NOTE — PROGRESS NOTE ADULT - SUBJECTIVE AND OBJECTIVE BOX
[facility]   [phone]  RENAL FOLLOW UP NOTE  --------------------------------------------------------------------------------  Chief Complaint:    24 hour events/subjective:    patient seen and examined.   +orthostatic today, unable to stand    PAST HISTORY  --------------------------------------------------------------------------------  No significant changes to PMH, PSH, FHx, SHx, unless otherwise noted    ALLERGIES & MEDICATIONS  --------------------------------------------------------------------------------  Allergies    contrast media (iodine-based) (Fever; Vomiting; Flushing; Hypotension; Rash; Stomach Upset)  Ativan (Rash; Urticaria)  penicillins (Hives (Mod to Severe); Anaphylaxis (Mod to Severe); Short breath (Mod to Severe); Angioedema (Mod to Severe))    Intolerances      Standing Inpatient Medications  ammonium lactate 12% Lotion 1 Application(s) Topical <User Schedule>  apixaban 5 milliGRAM(s) Oral two times a day  aspirin  chewable 81 milliGRAM(s) Oral daily  atorvastatin 80 milliGRAM(s) Oral at bedtime  calamine/zinc oxide Lotion 1 Application(s) Topical three times a day  dextrose 10% Bolus 125 milliLiter(s) IV Bolus once  dextrose 5%. 1000 milliLiter(s) IV Continuous <Continuous>  dextrose 5%. 1000 milliLiter(s) IV Continuous <Continuous>  dextrose 50% Injectable 25 Gram(s) IV Push once  dextrose 50% Injectable 12.5 Gram(s) IV Push once  droxidopa 400 milliGRAM(s) Oral three times a day  ferrous    sulfate 325 milliGRAM(s) Oral two times a day  fludroCORTISONE 0.1 milliGRAM(s) Oral daily  glucagon  Injectable 1 milliGRAM(s) IntraMuscular once  insulin glargine Injectable (LANTUS) 12 Unit(s) SubCutaneous <User Schedule>  insulin lispro (ADMELOG) corrective regimen sliding scale   SubCutaneous three times a day before meals  insulin lispro (ADMELOG) corrective regimen sliding scale   SubCutaneous <User Schedule>  insulin lispro Injectable (ADMELOG) 2 Unit(s) SubCutaneous before dinner  insulin lispro Injectable (ADMELOG) 5 Unit(s) SubCutaneous before breakfast  insulin lispro Injectable (ADMELOG) 4 Unit(s) SubCutaneous before lunch  midodrine. 30 milliGRAM(s) Oral <User Schedule>  midodrine. 20 milliGRAM(s) Oral <User Schedule>  Nephro-molly 1 Tablet(s) Oral daily  pantoprazole    Tablet 40 milliGRAM(s) Oral every 24 hours  psyllium Powder 1 Packet(s) Oral three times a day  pyridostigmine 30 milliGRAM(s) Oral <User Schedule>  triamcinolone 0.1% Ointment 1 Application(s) Topical two times a day    PRN Inpatient Medications  dextrose Oral Gel 15 Gram(s) Oral once PRN      REVIEW OF SYSTEMS  --------------------------------------------------------------------------------    Gen: denies fevers/chills,  CVS: denies chest pain/palpitations  Resp: denies SOB/Cough  GI: Denies N/V/Abd pain  : Denies dysuria    VITALS/PHYSICAL EXAM  --------------------------------------------------------------------------------  T(C): 36.5 (06-10-24 @ 13:30), Max: 36.7 (06-09-24 @ 20:18)  HR: 70 (06-10-24 @ 13:30) (67 - 87)  BP: 146/79 (06-10-24 @ 13:30) (130/70 - 146/79)  RR: 16 (06-10-24 @ 13:30) (16 - 18)  SpO2: 96% (06-10-24 @ 13:30) (96% - 100%)  Wt(kg): --        06-09-24 @ 07:01  -  06-10-24 @ 07:00  --------------------------------------------------------  IN: 420 mL / OUT: 400 mL / NET: 20 mL      Physical Exam:  	              Gen: comfortable appearing   	Pulm: decrease bs  no rales or ronchi or wheezing  	CV: No JVD. RRR, S1S2; no rub II/VI M   	Abd: +BS, soft, nontender/nondistended, obese   	UE: Warm, no cyanosis  no clubbing, no edema  	LE: Warm, no cyanosis  no clubbing, 2- edema, B/L ACE bandage  	Neuro: alert and oriented     LABS/STUDIES  --------------------------------------------------------------------------------    134  |  100  |  26  ----------------------------<  395      [06-09-24 @ 12:05]  5.3   |  18  |  1.63        Ca     8.8     [06-09-24 @ 12:05]      Mg     2.0     [06-09-24 @ 12:05]      Phos  4.0     [06-09-24 @ 12:05]            Creatinine Trend:  SCr 1.63 [06-09 @ 12:05]  SCr 1.61 [06-07 @ 07:19]  SCr 2.06 [06-03 @ 06:40]  SCr 1.77 [06-02 @ 07:05]  SCr 1.95 [06-01 @ 12:06]            PTH -- (Ca 8.4)      [04-19-24 @ 17:54]   109  TSH 10.20      [05-28-24 @ 06:42]  Lipid: chol 74, TG 70, HDL 33, LDL --      [04-13-24 @ 07:05]

## 2024-06-10 NOTE — PROGRESS NOTE ADULT - PROBLEM SELECTOR PLAN 5
Home regimen: lantus 33u qhs w/ premeal sliding scale. A1c 7.4% in 3/2024. multiple hypoglycemic episodes, DKA now resolved, anion gap closed.  Close discussions with endocrine ongoing regarding management.  Hypoglycemic in AM several times now since discharge from MICU.  Appears to have more controlled blood sugars  - endo following, appreciate recs   - dosing basal and premeal insulin per endocrine recs  -overnight 6/8 lantus held -> will continue basal insulin in AM  - c/w low ISS  - premeal and bedtime fingersticks  - hypoglycemia protocol. Home regimen: lantus 33u qhs w/ premeal sliding scale. A1c 7.4% in 3/2024. multiple hypoglycemic episodes, DKA now resolved, anion gap closed.  Close discussions with endocrine ongoing regarding management.  Hypoglycemic in AM several times now since discharge from MICU.  Appears to have more controlled blood sugars    -overnight 6/8 lantus held -> will continue basal insulin in AM  - premeal and bedtime fingersticks  - hypoglycemia protocol.    PLAN  - LA: 12units AM, SA: 4/4/3, SSI

## 2024-06-10 NOTE — PROGRESS NOTE ADULT - ATTENDING COMMENTS
73F PMH: HFrEF (EF 30%), AS, LBBB, HTN, DM1, CKD, hypothyroidism, chronic lymphedema, LELIA (3L home O2) p/w for syncope 2/2 severe AS, s/p TAVR 4/24. Course complicated by contrast induced allergic reaction (had CTA for TAVR eval) and contrast related renal failure (acute on chronic) requiring HD. TAVR c/b VT and vfib requiring shock and flash pulmonary edema causing Acute hypoxic respiratory failure  requiring intubation.  Course complicated by contrast induced allergic reaction (had CTA for TAVR eval) and contrast related renal failure (acute on chronic) requiring CRRT for fluid removal and pressors for vasoplegia and hypovolemia due to CRRT. Patient extubated, now off pressors, CRRT.     1. Orthostatic hypotension- severe and persistent despite droxydopa, midodrine with florinef. Pyridostigmine started on 6/5. Orthostasis and symptoms improved initially but worsened again today. Pyridostigmine increased. To reassess tomorrow.     2. Urinary retention- failed 3 voiding trials.   3. Left UE DVT- c/w eliquis. Repeat US showing persistent DVT noted in the left subclavian and axillary veins. ACE wrap for swelling.   4. T1DM- uncontrolled with intermittent episodes of labile BS. Being managed by endocrinology. Currently on Lantus and Admelog.    Rest of care per plan above 73F PMH: HFrEF (EF 30%), AS, LBBB, HTN, DM1, CKD, hypothyroidism, chronic lymphedema, LELIA (3L home O2) p/w for syncope 2/2 severe AS, s/p TAVR 4/24. Course complicated by contrast induced allergic reaction (had CTA for TAVR eval) and contrast related renal failure (acute on chronic) requiring HD. TAVR c/b VT and vfib requiring shock and flash pulmonary edema causing Acute hypoxic respiratory failure  requiring intubation.  Course complicated by contrast induced allergic reaction (had CTA for TAVR eval) and contrast related renal failure (acute on chronic) requiring CRRT for fluid removal and pressors for vasoplegia and hypovolemia due to CRRT. Patient extubated, now off pressors, CRRT.     1. Orthostatic hypotension- severe and persistent despite droxidopa, midodrine with florinef. Pyridostigmine started on 6/5. Orthostasis and symptoms improved initially but worsened again, Pyridostigmine increased. Droxidopa increased back to previous dose. Monitor.     2. Urinary retention- failed 3 voiding trials.   3. Left UE DVT- c/w eliquis. Repeat US showing persistent DVT noted in the left subclavian and axillary veins. ACE wrap for swelling.   4. T1DM- uncontrolled with intermittent episodes of labile BS. Being managed by endocrinology. Currently on Lantus and Admelog.    Rest of care per plan above

## 2024-06-10 NOTE — PROGRESS NOTE ADULT - PROBLEM SELECTOR PLAN 1
Check BG TID AC, HS, and 2AM  - Continue Lantus 12 units in am   - Adjust Admelog to 5-4-2 units before meal ( hold if NPO or eats less than 50 % of meals )   - C/w Low dose Admelog correction scale TID AC, low dose Admelog correction scale QHS and 2am in case of rebound hypo/hyperglycemia  - please keep hypoglycemia protocol in place     Discharge:  - Will be determined according to BG/insulin needs/PO intake  at time of discharge; basal/bolus insulin doses TBD  - Of note, patient instructed on discharge from recent hospitalization at [place] to start Farxiga for CHF. Given risks of DKA of SGLT2i in T1DM, would not start until better data is available for its benefits.  - Follow up with Dr. Luis Dumont outpatient  - Patient will likely required rehab  - F/u with optho/renal/cardiac as outpatient.

## 2024-06-10 NOTE — PROGRESS NOTE ADULT - SUBJECTIVE AND OBJECTIVE BOX
****[facility] PGY-1*****    CHIEF COMPLAINT:    Overnight Events:  Interval Events:    OBJECTIVE:  ICU Vital Signs Last 24 Hrs  T(C): 36.7 (10 Frederic 2024 05:05), Max: 36.7 (09 Jun 2024 20:18)  T(F): 98 (10 Frederic 2024 05:05), Max: 98.1 (09 Jun 2024 20:18)  HR: 79 (10 Frederic 2024 05:05) (67 - 81)  BP: 132/66 (10 Frederic 2024 05:05) (130/70 - 132/66)  BP(mean): --  ABP: --  ABP(mean): --  RR: 18 (10 Frederic 2024 05:05) (18 - 18)  SpO2: 96% (10 Frederic 2024 05:05) (96% - 100%)    O2 Parameters below as of 10 Frederic 2024 05:05  Patient On (Oxygen Delivery Method): room air              06-08 @ 07:01  -  06-09 @ 07:00  --------------------------------------------------------  IN: 560 mL / OUT: 2400 mL / NET: -1840 mL    06-09 @ 07:01  -  06-10 @ 06:04  --------------------------------------------------------  IN: 420 mL / OUT: 400 mL / NET: 20 mL      CAPILLARY BLOOD GLUCOSE      POCT Blood Glucose.: 137 mg/dL (10 Frederic 2024 02:07)      PHYSICAL EXAM  General:   Head:    Eyes:   Neck:   Cardiac:   Lungs:   Abdomen:   Extremities:   Neuro  Psych:   Skin:  Etc:      HOSPITAL MEDICATIONS:  MEDICATIONS  (STANDING):  ammonium lactate 12% Lotion 1 Application(s) Topical <User Schedule>  apixaban 5 milliGRAM(s) Oral two times a day  aspirin  chewable 81 milliGRAM(s) Oral daily  atorvastatin 80 milliGRAM(s) Oral at bedtime  calamine/zinc oxide Lotion 1 Application(s) Topical three times a day  dextrose 10% Bolus 125 milliLiter(s) IV Bolus once  dextrose 5%. 1000 milliLiter(s) (100 mL/Hr) IV Continuous <Continuous>  dextrose 5%. 1000 milliLiter(s) (50 mL/Hr) IV Continuous <Continuous>  dextrose 50% Injectable 25 Gram(s) IV Push once  dextrose 50% Injectable 12.5 Gram(s) IV Push once  droxidopa 600 milliGRAM(s) Oral three times a day  ferrous    sulfate 325 milliGRAM(s) Oral two times a day  fludroCORTISONE 0.1 milliGRAM(s) Oral daily  glucagon  Injectable 1 milliGRAM(s) IntraMuscular once  insulin glargine Injectable (LANTUS) 12 Unit(s) SubCutaneous <User Schedule>  insulin lispro (ADMELOG) corrective regimen sliding scale   SubCutaneous three times a day before meals  insulin lispro (ADMELOG) corrective regimen sliding scale   SubCutaneous <User Schedule>  insulin lispro Injectable (ADMELOG) 4 Unit(s) SubCutaneous before breakfast  insulin lispro Injectable (ADMELOG) 4 Unit(s) SubCutaneous before lunch  insulin lispro Injectable (ADMELOG) 3 Unit(s) SubCutaneous before dinner  levothyroxine 75 MICROGram(s) Oral daily  midodrine. 30 milliGRAM(s) Oral <User Schedule>  midodrine. 20 milliGRAM(s) Oral <User Schedule>  Nephro-molly 1 Tablet(s) Oral daily  pantoprazole    Tablet 40 milliGRAM(s) Oral every 24 hours  psyllium Powder 1 Packet(s) Oral three times a day  pyridostigmine 30 milliGRAM(s) Oral <User Schedule>  triamcinolone 0.1% Ointment 1 Application(s) Topical two times a day    MEDICATIONS  (PRN):  dextrose Oral Gel 15 Gram(s) Oral once PRN Blood Glucose LESS THAN 70 milliGRAM(s)/deciliter      LABS:    Hgb Trend: 10.5<--, 10.5<--  06-09    134<L>  |  100  |  26<H>  ----------------------------<  395<H>  5.3   |  18<L>  |  1.63<H>    Ca    8.8      09 Jun 2024 12:05  Phos  4.0     06-09  Mg     2.0     06-09      Creatinine Trend: 1.63<--, 1.61<--, 2.06<--, 1.77<--, 1.95<--, 2.50<--    Urinalysis Basic - ( 09 Jun 2024 12:05 )    Color: x / Appearance: x / SG: x / pH: x  Gluc: 395 mg/dL / Ketone: x  / Bili: x / Urobili: x   Blood: x / Protein: x / Nitrite: x   Leuk Esterase: x / RBC: x / WBC x   Sq Epi: x / Non Sq Epi: x / Bacteria: x            MICROBIOLOGY:       RADIOLOGY:  [ ] Reviewed by me   ****[facility] PGY-1*****    CHIEF COMPLAINT: Orthostatic hypotension    Overnight Events: NA  Interval Events: Patient reported feeling better today compared to yesterday. She had no other concerns.  She denied F/C, NV, CP/SOB, Abdominal pain or issues with urinating. She endorses loose stools.     OBJECTIVE:  ICU Vital Signs Last 24 Hrs  T(C): 36.7 (10 Frederic 2024 05:05), Max: 36.7 (09 Jun 2024 20:18)  T(F): 98 (10 Frederic 2024 05:05), Max: 98.1 (09 Jun 2024 20:18)  HR: 79 (10 Frederic 2024 05:05) (67 - 81)  BP: 132/66 (10 Frederic 2024 05:05) (130/70 - 132/66)  BP(mean): --  ABP: --  ABP(mean): --  RR: 18 (10 Frederic 2024 05:05) (18 - 18)  SpO2: 96% (10 Frederic 2024 05:05) (96% - 100%)    O2 Parameters below as of 10 Frederic 2024 05:05  Patient On (Oxygen Delivery Method): room air    06-08 @ 07:01  -  06-09 @ 07:00  --------------------------------------------------------  IN: 560 mL / OUT: 2400 mL / NET: -1840 mL    06-09 @ 07:01  -  06-10 @ 06:04  --------------------------------------------------------  IN: 420 mL / OUT: 400 mL / NET: 20 mL      CAPILLARY BLOOD GLUCOSE  POCT Blood Glucose.: 137 mg/dL (10 Frederic 2024 02:07)    PHYSICAL EXAM  General: Elderly female, asleep, no acute distress  Head: NA  Eyes: NA  Neck: NA  Cardiac: irregularly irregular ryhthm, no other pathological heart sounds  Lungs: moves air well, clear lung fields  Abdomen: +BS, Non tender non distended  Extremities: +2LE edema  Neuro: AO3  Psych: NA  Skin: NA  Etc:  NA    HOSPITAL MEDICATIONS:  MEDICATIONS  (STANDING):  ammonium lactate 12% Lotion 1 Application(s) Topical <User Schedule>  apixaban 5 milliGRAM(s) Oral two times a day  aspirin  chewable 81 milliGRAM(s) Oral daily  atorvastatin 80 milliGRAM(s) Oral at bedtime  calamine/zinc oxide Lotion 1 Application(s) Topical three times a day  dextrose 10% Bolus 125 milliLiter(s) IV Bolus once  dextrose 5%. 1000 milliLiter(s) (100 mL/Hr) IV Continuous <Continuous>  dextrose 5%. 1000 milliLiter(s) (50 mL/Hr) IV Continuous <Continuous>  dextrose 50% Injectable 25 Gram(s) IV Push once  dextrose 50% Injectable 12.5 Gram(s) IV Push once  droxidopa 600 milliGRAM(s) Oral three times a day  ferrous    sulfate 325 milliGRAM(s) Oral two times a day  fludroCORTISONE 0.1 milliGRAM(s) Oral daily  glucagon  Injectable 1 milliGRAM(s) IntraMuscular once  insulin glargine Injectable (LANTUS) 12 Unit(s) SubCutaneous <User Schedule>  insulin lispro (ADMELOG) corrective regimen sliding scale   SubCutaneous three times a day before meals  insulin lispro (ADMELOG) corrective regimen sliding scale   SubCutaneous <User Schedule>  insulin lispro Injectable (ADMELOG) 4 Unit(s) SubCutaneous before breakfast  insulin lispro Injectable (ADMELOG) 4 Unit(s) SubCutaneous before lunch  insulin lispro Injectable (ADMELOG) 3 Unit(s) SubCutaneous before dinner  levothyroxine 75 MICROGram(s) Oral daily  midodrine. 30 milliGRAM(s) Oral <User Schedule>  midodrine. 20 milliGRAM(s) Oral <User Schedule>  Nephro-molly 1 Tablet(s) Oral daily  pantoprazole    Tablet 40 milliGRAM(s) Oral every 24 hours  psyllium Powder 1 Packet(s) Oral three times a day  pyridostigmine 30 milliGRAM(s) Oral <User Schedule>  triamcinolone 0.1% Ointment 1 Application(s) Topical two times a day    MEDICATIONS  (PRN):  dextrose Oral Gel 15 Gram(s) Oral once PRN Blood Glucose LESS THAN 70 milliGRAM(s)/deciliter      LABS:    Hgb Trend: 10.5<--, 10.5<--  06-09    134<L>  |  100  |  26<H>  ----------------------------<  395<H>  5.3   |  18<L>  |  1.63<H>    Ca    8.8      09 Jun 2024 12:05  Phos  4.0     06-09  Mg     2.0     06-09      Creatinine Trend: 1.63<--, 1.61<--, 2.06<--, 1.77<--, 1.95<--, 2.50<--    Urinalysis Basic - ( 09 Jun 2024 12:05 )    Color: x / Appearance: x / SG: x / pH: x  Gluc: 395 mg/dL / Ketone: x  / Bili: x / Urobili: x   Blood: x / Protein: x / Nitrite: x   Leuk Esterase: x / RBC: x / WBC x   Sq Epi: x / Non Sq Epi: x / Bacteria: x    MICROBIOLOGY:       RADIOLOGY:  [ ] Reviewed by me   ****[facility] PGY-1*****    CHIEF COMPLAINT: Orthostatic hypotension    Overnight Events: NA  Interval Events: Patient reported feeling better today compared to yesterday. She had no other concerns.  She denied F/C, NV, CP/SOB, Abdominal pain or issues with urinating. She endorses loose stools.     OBJECTIVE:  ICU Vital Signs Last 24 Hrs  T(C): 36.7 (10 Frederic 2024 05:05), Max: 36.7 (09 Jun 2024 20:18)  T(F): 98 (10 Frederic 2024 05:05), Max: 98.1 (09 Jun 2024 20:18)  HR: 79 (10 Frederic 2024 05:05) (67 - 81)  BP: 132/66 (10 Frederic 2024 05:05) (130/70 - 132/66)  BP(mean): --  ABP: --  ABP(mean): --  RR: 18 (10 Frederic 2024 05:05) (18 - 18)  SpO2: 96% (10 Frederic 2024 05:05) (96% - 100%)    O2 Parameters below as of 10 Frederic 2024 05:05  Patient On (Oxygen Delivery Method): room air    06-08 @ 07:01  -  06-09 @ 07:00  --------------------------------------------------------  IN: 560 mL / OUT: 2400 mL / NET: -1840 mL    06-09 @ 07:01  -  06-10 @ 06:04  --------------------------------------------------------  IN: 420 mL / OUT: 400 mL / NET: 20 mL      CAPILLARY BLOOD GLUCOSE  POCT Blood Glucose.: 137 mg/dL (10 Frederic 2024 02:07)    PHYSICAL EXAM  General: Elderly female, asleep, no acute distress  Head: NA  Eyes: NA  Neck: NA  Cardiac: Normal R/R,  no other pathological heart sounds  Lungs: moves air well, clear lung fields  Abdomen: +BS, Non tender non distended  Extremities: +2LE edema  Neuro: AO3  Psych: NA  Skin: NA  Etc:  NA    HOSPITAL MEDICATIONS:  MEDICATIONS  (STANDING):  ammonium lactate 12% Lotion 1 Application(s) Topical <User Schedule>  apixaban 5 milliGRAM(s) Oral two times a day  aspirin  chewable 81 milliGRAM(s) Oral daily  atorvastatin 80 milliGRAM(s) Oral at bedtime  calamine/zinc oxide Lotion 1 Application(s) Topical three times a day  dextrose 10% Bolus 125 milliLiter(s) IV Bolus once  dextrose 5%. 1000 milliLiter(s) (100 mL/Hr) IV Continuous <Continuous>  dextrose 5%. 1000 milliLiter(s) (50 mL/Hr) IV Continuous <Continuous>  dextrose 50% Injectable 25 Gram(s) IV Push once  dextrose 50% Injectable 12.5 Gram(s) IV Push once  droxidopa 600 milliGRAM(s) Oral three times a day  ferrous    sulfate 325 milliGRAM(s) Oral two times a day  fludroCORTISONE 0.1 milliGRAM(s) Oral daily  glucagon  Injectable 1 milliGRAM(s) IntraMuscular once  insulin glargine Injectable (LANTUS) 12 Unit(s) SubCutaneous <User Schedule>  insulin lispro (ADMELOG) corrective regimen sliding scale   SubCutaneous three times a day before meals  insulin lispro (ADMELOG) corrective regimen sliding scale   SubCutaneous <User Schedule>  insulin lispro Injectable (ADMELOG) 4 Unit(s) SubCutaneous before breakfast  insulin lispro Injectable (ADMELOG) 4 Unit(s) SubCutaneous before lunch  insulin lispro Injectable (ADMELOG) 3 Unit(s) SubCutaneous before dinner  levothyroxine 75 MICROGram(s) Oral daily  midodrine. 30 milliGRAM(s) Oral <User Schedule>  midodrine. 20 milliGRAM(s) Oral <User Schedule>  Nephro-molly 1 Tablet(s) Oral daily  pantoprazole    Tablet 40 milliGRAM(s) Oral every 24 hours  psyllium Powder 1 Packet(s) Oral three times a day  pyridostigmine 30 milliGRAM(s) Oral <User Schedule>  triamcinolone 0.1% Ointment 1 Application(s) Topical two times a day    MEDICATIONS  (PRN):  dextrose Oral Gel 15 Gram(s) Oral once PRN Blood Glucose LESS THAN 70 milliGRAM(s)/deciliter      LABS:    Hgb Trend: 10.5<--, 10.5<--  06-09    134<L>  |  100  |  26<H>  ----------------------------<  395<H>  5.3   |  18<L>  |  1.63<H>    Ca    8.8      09 Jun 2024 12:05  Phos  4.0     06-09  Mg     2.0     06-09      Creatinine Trend: 1.63<--, 1.61<--, 2.06<--, 1.77<--, 1.95<--, 2.50<--    Urinalysis Basic - ( 09 Jun 2024 12:05 )    Color: x / Appearance: x / SG: x / pH: x  Gluc: 395 mg/dL / Ketone: x  / Bili: x / Urobili: x   Blood: x / Protein: x / Nitrite: x   Leuk Esterase: x / RBC: x / WBC x   Sq Epi: x / Non Sq Epi: x / Bacteria: x    MICROBIOLOGY:       RADIOLOGY:  [ ] Reviewed by me

## 2024-06-10 NOTE — PROGRESS NOTE ADULT - ASSESSMENT
73F w HFrEF (EF 30%), mod AS, known LBBB, HTN, IDDM1, CKD, hypothyroidism, chronic lymphedema, p/w 1 episode of syncope with R arm jerking.     #Syncope-Aortic Stenosis  - AS in setting of decreased LVEF  - s/p tavr on 4/24 c/b VT -> shock -> VFib -> shock  - hypoxic/congested, and was intubated, extubated on 4/25  - on 4/25 with worsening bradycardia, s/p TVP placement followed by AV Micra placement   - Remains in Sinus Rhythm/known lbbb with intermittent   - on high dose midodrine, droxidopa and florinef in the setting of orthostasis.  - TTE on 5/21 with global LV dysfunction severely decreased. Well seated TAVR, limited echo 6/4 with unchanged mod lv and no sig changes overall  - to consider CRT-D in the future  - Cannot initiate GDMT due to orthostasis  - would try to mobilize as best as possible and monitor orthostatics, hopefully standing bp can be kept >100.   - cont asa and statin  - Hgb stable     #Chronic Systolic HF (EF 30%), mod to severe AS, HTN, LBBB, Lymphedema   - Has known systolic HF and AS.    - Repeat TTE showed EF 30%, mild-mod LVH, mildly reduced RVF, mod-severe AS (.61 cmsq), mod pHTN  - Repeat limited study with normal function TAVR  - On home Torsemide 20 mg daily, Eplerenone 25 mg daily, and Toprol  mg daily, all currently on hold  - On Midodrine 20mg TID for orthostatic hypotension  - Droxidopa now initiated as well for significant orthostasis upto 600mg TID  - Unable to initiate GDMT at this time due to significant orthostatic hypotension  - initially CVVHD, then HD  - HD now on hold, as she is urinating and creatinine remains stable,   - Creatinine improving  - prn iv bumex  - Diagnosed with UE DVT and now on full loading dose of Eliquis  - PT and Bed to chair as much as possible    #Orthostatic hypotension.   - would attempt orthostatics daily.  Hoping to wean off midodrine in order to restart GDMT. Orthostatics improving from supine to seated, but still problematic with standing. Remains orthostatic and symptomatic on standing  - Repeat othostatics-improved-Florinef decreased 2/2 increased edema, midodrine increased to 30mgBID 20mg OD as still symptomatic with standing  - Overall symptoms are markedly improving, though gradually. Will see where numbers are today.  - On Pyridostigmine 50md TID  - Repeat orthostatics and OOB     # Atrial Fibrillation  - Reported episode over the weekend (6/1-6/2)  - Is on AC for DVT  - Will need outpatient monitoring  - Continue telemetry    #UTI  - Abx as per ID

## 2024-06-10 NOTE — PROGRESS NOTE ADULT - PROBLEM SELECTOR PLAN 3
#GI BLEED (RESOLVED)  Patient had maroon-colored stool on 5/24, positive for blood.  Hemodynamically stable with stable hemoglobin.    - GI outpatient Dr Hansel Luna consulted, appreciate recs  - c/w Protonix 40mg qd PO per GI  - bowel regimen held  - Continue Eliquis 5mg BID unless there is a drop in Hb (Hgb stable to 9-10)  - Continue diet ***Resolved***  Patient had maroon-colored stool on 5/24, positive for blood.  Hemodynamically stable with stable hemoglobin.    - GI outpatient Dr Hansel Luna consulted, appreciate recs  - c/w Protonix 40mg qd PO per GI  - bowel regimen held  - Continue Eliquis 5mg BID unless there is a drop in Hb (Hgb stable to 9-10)  - Continue diet

## 2024-06-10 NOTE — PROGRESS NOTE ADULT - PROBLEM SELECTOR PLAN 2
5/29 retaining urine requiring straight cath. On straight cath x2 urine was cloudy/milky in appearance. Hardy catheter replaced. UA with >998 WBC, LE, large blood, >36 epithelial cells concerning for UTI. She reports no other urinary sx.  UCx: E. Coli and VRE E. Faecalis   -s/p macrobid (6/3 - 6/6)  -s/p CTX (5/29 - 5/31)  -hardy placed 5/29. ***Resolved***  5/29 retaining urine requiring straight cath. On straight cath x2 urine was cloudy/milky in appearance. Hardy catheter replaced. UA with >998 WBC, LE, large blood, >36 epithelial cells concerning for UTI. She reports no other urinary sx.  UCx: E. Coli and VRE E. Faecalis   -s/p macrobid (6/3 - 6/6)  -s/p CTX (5/29 - 5/31)  -hardy placed 5/29.

## 2024-06-10 NOTE — PROGRESS NOTE ADULT - ASSESSMENT
73F w/h/o of T1DM with unknown control due to CKD and anemia of chronic disease. DM c/b CKD. Also h/o HFrEF (EF 30%), mod AS, known LBBB, HTN,  hypothyroidism, chronic lymphedema, suspected OHS/LELIA on 3L NC home O2 p/w 1 episode of syncope with R arm jerking, likely 2/2 severe symptomatic AS. S/p AVR 4/24 c/b DUNCAN on CKD, fever and hypotension. Also UTI/pul edema/ sepsis and L adrenal nodule finding. Also orthostatic hypotension > now on Florinef.   Last 24 hour BGs variable .  Tightly controlled BG ( 86) at HS yesterday, improved fasting BG ( 184), severe hyperglycemia at prelunch ( 300s). No overnight hypoglycemia. As per pt, eating full meals. Will adjust insulin doses for BG goal (100-180mg/dl).     Per endocrine attending Dr Burton> Adrenal nodule work up completed and pt will need to f/u once she is more stable as out pt.     Home regimen: Lantus 33 units qhs, Novolog based on sliding scale TIDAC normally 3-5 units  Has Dexcom G7

## 2024-06-10 NOTE — PROGRESS NOTE ADULT - SUBJECTIVE AND OBJECTIVE BOX
Seen earlier today     Chief Complaint: Diabetes Mellitus follow up    INTERVAL HX: "Feel okay". Tolerating POs, eats full meals. Noted severe hyperglycemia yesterday am after not receiving Lantus on 6/8. Tightly controlled BG( 86 and 90) at HS even after eating full meal for dinner yesterday. Fasting  today and prelunch  without snack between meals.     Review of Systems:  General: As above  GI: No nausea, vomiting  Endocrine: no  S&Sx of hypoglycemia    Allergies    contrast media (iodine-based) (Fever; Vomiting; Flushing; Hypotension; Rash; Stomach Upset)  Ativan (Rash; Urticaria)  penicillins (Hives (Mod to Severe); Anaphylaxis (Mod to Severe); Short breath (Mod to Severe); Angioedema (Mod to Severe))    Intolerances      MEDICATIONS  (STANDING):  ammonium lactate 12% Lotion 1 Application(s) Topical <User Schedule>  apixaban 5 milliGRAM(s) Oral two times a day  aspirin  chewable 81 milliGRAM(s) Oral daily  atorvastatin 80 milliGRAM(s) Oral at bedtime  calamine/zinc oxide Lotion 1 Application(s) Topical three times a day  dextrose 10% Bolus 125 milliLiter(s) IV Bolus once  dextrose 5%. 1000 milliLiter(s) (100 mL/Hr) IV Continuous <Continuous>  dextrose 5%. 1000 milliLiter(s) (50 mL/Hr) IV Continuous <Continuous>  dextrose 50% Injectable 25 Gram(s) IV Push once  dextrose 50% Injectable 12.5 Gram(s) IV Push once  droxidopa 400 milliGRAM(s) Oral three times a day  ferrous    sulfate 325 milliGRAM(s) Oral two times a day  fludroCORTISONE 0.1 milliGRAM(s) Oral daily  glucagon  Injectable 1 milliGRAM(s) IntraMuscular once  insulin glargine Injectable (LANTUS) 12 Unit(s) SubCutaneous <User Schedule>  insulin lispro (ADMELOG) corrective regimen sliding scale   SubCutaneous three times a day before meals  insulin lispro (ADMELOG) corrective regimen sliding scale   SubCutaneous <User Schedule>  insulin lispro Injectable (ADMELOG) 2 Unit(s) SubCutaneous before dinner  insulin lispro Injectable (ADMELOG) 4 Unit(s) SubCutaneous before breakfast  insulin lispro Injectable (ADMELOG) 4 Unit(s) SubCutaneous before lunch  levothyroxine 75 MICROGram(s) Oral daily  midodrine. 30 milliGRAM(s) Oral <User Schedule>  midodrine. 20 milliGRAM(s) Oral <User Schedule>  Nephro-molly 1 Tablet(s) Oral daily  pantoprazole    Tablet 40 milliGRAM(s) Oral every 24 hours  psyllium Powder 1 Packet(s) Oral three times a day  pyridostigmine 30 milliGRAM(s) Oral <User Schedule>  triamcinolone 0.1% Ointment 1 Application(s) Topical two times a day        atorvastatin   80 milliGRAM(s) Oral (06-09-24 @ 21:16)    fludroCORTISONE   0.1 milliGRAM(s) Oral (06-10-24 @ 06:04)    insulin glargine Injectable (LANTUS)   12 Unit(s) SubCutaneous (06-10-24 @ 09:14)    insulin lispro (ADMELOG) corrective regimen sliding scale   4 Unit(s) SubCutaneous (06-10-24 @ 12:39)   1 Unit(s) SubCutaneous (06-10-24 @ 09:13)   2 Unit(s) SubCutaneous (06-09-24 @ 18:04)    insulin lispro Injectable (ADMELOG)   3 Unit(s) SubCutaneous (06-09-24 @ 18:04)    insulin lispro Injectable (ADMELOG)   4 Unit(s) SubCutaneous (06-10-24 @ 09:12)    insulin lispro Injectable (ADMELOG)   4 Unit(s) SubCutaneous (06-10-24 @ 12:38)    levothyroxine   75 MICROGram(s) Oral (06-10-24 @ 06:04)        PHYSICAL EXAM:  VITALS: T(C): 36.7 (06-10-24 @ 05:05)  T(F): 98 (06-10-24 @ 05:05), Max: 98.1 (06-09-24 @ 20:18)  HR: 87 (06-10-24 @ 12:28) (67 - 87)  BP: 141/59 (06-10-24 @ 12:28) (130/70 - 141/59)  RR:  (18 - 18)  SpO2:  (96% - 100%)  Wt(kg): --  GENERAL: Female laying in bed, in NAD  Respiratory: Respirations unlabored   Extremities: Warm, LUE edema with ACE wrap   NEURO: Alert , appropriate     LABS:  POCT Blood Glucose.: 332 mg/dL (06-10-24 @ 12:35)  POCT Blood Glucose.: 184 mg/dL (06-10-24 @ 08:38)  POCT Blood Glucose.: 137 mg/dL (06-10-24 @ 02:07)  POCT Blood Glucose.: 90 mg/dL (06-09-24 @ 22:17)  POCT Blood Glucose.: 86 mg/dL (06-09-24 @ 21:45)  POCT Blood Glucose.: 207 mg/dL (06-09-24 @ 17:55)  POCT Blood Glucose.: 316 mg/dL (06-09-24 @ 12:49)  POCT Blood Glucose.: 314 mg/dL (06-09-24 @ 08:59)  POCT Blood Glucose.: 243 mg/dL (06-09-24 @ 02:45)  POCT Blood Glucose.: 140 mg/dL (06-09-24 @ 00:12)  POCT Blood Glucose.: 90 mg/dL (06-08-24 @ 22:38)  POCT Blood Glucose.: 68 mg/dL (06-08-24 @ 22:17)  POCT Blood Glucose.: 53 mg/dL (06-08-24 @ 21:51)  POCT Blood Glucose.: 54 mg/dL (06-08-24 @ 21:49)  POCT Blood Glucose.: 86 mg/dL (06-08-24 @ 17:08)  POCT Blood Glucose.: 99 mg/dL (06-08-24 @ 12:57)  POCT Blood Glucose.: 76 mg/dL (06-08-24 @ 12:50)  POCT Blood Glucose.: 111 mg/dL (06-08-24 @ 08:26)  POCT Blood Glucose.: 201 mg/dL (06-08-24 @ 02:45)  POCT Blood Glucose.: 115 mg/dL (06-07-24 @ 22:44)  POCT Blood Glucose.: 76 mg/dL (06-07-24 @ 21:42)  POCT Blood Glucose.: 90 mg/dL (06-07-24 @ 17:27)      06-09    134<L>  |  100  |  26<H>  ----------------------------<  395<H>  5.3   |  18<L>  |  1.63<H>    Ca    8.8      09 Jun 2024 12:05  Phos  4.0     06-09  Mg     2.0     06-09        04-13 Chol 74 Direct LDL -- LDL calculated 25 HDL 33<L> Trig 70  Thyroid Function Tests:  05-28 @ 06:42 TSH 10.20 FreeT4 1.0 T3 -- Anti TPO -- Anti Thyroglobulin Ab -- TSI --      A1C with Estimated Average Glucose Result: 7.4 % (03-30-24 @ 08:21)    Estimated Average Glucose: 166 mg/dL (03-30-24 @ 08:21)        Diet, Consistent Carbohydrate w/Evening Snack:   Kwesi(7 Gm Arginine/7 Gm Glut/1.2 Gm HMB     Qty per Day:  2 (05-28-24 @ 15:11) [Active]

## 2024-06-10 NOTE — PROGRESS NOTE ADULT - ASSESSMENT
73F w HFrEF (EF 30%), mod AS, known LBBB, HTN, IDDM1, CKD, hypothyroidism, chronic lymphedema, suspected OHS/LELIA on 3L NC home O2 p/w 1 episode of syncope. Recent admission at [facility] (3/29-4/5) for ADHF and DUNCAN s/p diuresis, discharged with new home O2 3L NC suspecting OHS/LELIA pending outpatient w/u. Since discharge a week ago, she has been staying at home with   Pt had sob walking to car. Once in car, she was still breathing heavily. Partner noticed pt was not responding to verbal stimuli for 10-15sec and witnessed R arm jerking. Pt gained consciousness quickly without postictal symptoms. Pt went to Cardiologist Dr. Nathan who recommended pt to come to ED. No fever, cp, abd pain, n/v. Leg swelling is improved from prior. Pt on torsemide 40mg which she has been taking. Pt was discharged on 3LNC which she is currently on. Patient reports she did not take Farxiga that she was discharged on as she was concerned about side effects.     In ED, patient was found afebrile, hemodynamically stable, breathing well on 3L NC. Initial labs notable for mild hyponatremia, DUNCAN on CKD, elevated proBNP and elevated pCO2 both improved from prior.  pt also noticed with abn creatinine. had severe AS had ct scan with IV contrast had contrast nephropathy and hypotension ---> CRRT required       1- CKD IV  2- CHF   3- lymphedema   4- severe AS  s/p tavr 4/24  5- hypotension orthostatic       creatinine is overall steady in this range  her edema has been worsening, and bed weight has been rising  diuretics have not been restarted due to persistent orthostasis   orthostatic hypotension,  midodrine 30 mg am, and afternoon  midodrine 20 mg bedtime,  northera 400 mg TID   cont mestinone 30 mg 8 am and 1 pm   florinef 0.1 mg daily stop after 5 days  continue to check orthostatic bp  also PT for conditioning   anemia, hb higher  now off epogen  non-oliguric,

## 2024-06-10 NOTE — PROGRESS NOTE ADULT - PROBLEM SELECTOR PLAN 1
Likely in setting of deconditioning.  Improving with fluids.  Will attempt orthostatics daily.  Hoping to wean off midodrine in order to restart GDMT.  Orthostatics improving from supine to seated, but still problematic with standing.    - leg compression, will attempt abdominal binder however difficult due to body habitus   - OOB as tolerated   - midodrine TID (30mg, 30mg, 20mg).  - continue droxydopa 600mg TID  - decreased fludrocortisone to 0.1mg daily  - Continue to evaluate standing orthostatics daily  - continue pyridostigmine 30mg tid Likely in setting of deconditioning.  Improving with fluids.  Will attempt orthostatics daily.  Hoping to wean off midodrine in order to restart GDMT.  Orthostatics improving from supine to seated, but still problematic with standing.    - leg compression, will attempt abdominal binder however difficult due to body habitus     PLAN  - continue droxydopa 600mg TID, midodrine TID (30mg, 30mg, 20mg). fludrocortisone to 0.1mg daily,  pyridostigmine 30mg tid  - Orthostatics daily Likely in setting of deconditioning.  Improving with fluids.  Will attempt orthostatics daily.  Hoping to wean off midodrine in order to restart GDMT.  Orthostatics improving from supine to seated, but still problematic with standing.    - leg compression, will attempt abdominal binder however difficult due to body habitus     PLAN  - continue droxydopa 400mgTID(decreased from 600mgTID) , midodrine TID (30mg, 30mg, 20mg). fludrocortisone to 0.1mg daily,  pyridostigmine 30mg tid  - Orthostatics daily

## 2024-06-10 NOTE — PROGRESS NOTE ADULT - PROBLEM SELECTOR PLAN 7
EF from 4/2024 (post-TAVR) w/ EF 25%, global LV hypokinesis, moderately dilated LV, grade 2 LV diastolic dysfunction, RA moderately dilated.  Repeat TTE with reduced LV function.  TAVR remains well seated  - cards following, will f/u with cardiology regarding resuming GDMT  - per cards, overnight telemetry 6/1 consistent with afib, recommending to continue monitoring on tele  - continue midodrine 15mg TID   - Continue atorvastatin 80mg daily  - hold home torsemide 20mg qd, hold home eplerenone 25mg qd, hold home metoprolol succinate 100mg qd  - Repeat TTE 6/4 predominantly unchanged   - f/u w/ EP outpatient for CRT.    #Aortic Stenosis  Presented for syncope secondary to severe AS, s/p TAVR on 4/24 c/b by VT -> shock -> VFib -> shock.  Course complicated w/ symptomatic bradycardia, requiring TVP, now s/p micra PPM. TAVR well seated on recent TTE without regurgitation  - continue aspirin 81mg daily. EF from 4/2024 (post-TAVR) w/ EF 25%, global LV hypokinesis, moderately dilated LV, grade 2 LV diastolic dysfunction, RA moderately dilated.  Repeat TTE with reduced LV function.  TAVR remains well seated  - cards following, will f/u with cardiology regarding resuming GDMT  - per cards, overnight telemetry 6/1 consistent with afib, recommending to continue monitoring on tele  - Continue atorvastatin 80mg daily  - f/u w/ EP outpatient for CRT.    PLAN  - Midodrine as above |  - Holding home GDMT (torsemide 20mg qd, hold home eplerenone 25mg qd, hold home metoprolol succinate 100mg qd)    #Aortic Stenosis  Presented for syncope secondary to severe AS, s/p TAVR on 4/24 c/b by VT -> shock -> VFib -> shock.  Course complicated w/ symptomatic bradycardia, requiring TVP, now s/p micra PPM. TAVR well seated on recent TTE without regurgitation  - continue aspirin 81mg daily.

## 2024-06-10 NOTE — PROGRESS NOTE ADULT - NSPROGADDITIONALINFOA_GEN_ALL_CORE
Contact via [employer] Teams during business hours  To reach covering provider access [employer] via sun[employer] tools  For Urgent matters/after-hours/weekends/holidays please page endocrine fellow on call   For nonurgent matters please email [email or web address]    Please note that this patient may be followed by different provider tomorrow.  Notify endocrine 24 hours prior to discharge for final recommendations

## 2024-06-10 NOTE — PROGRESS NOTE ADULT - NS ATTEND AMEND GEN_ALL_CORE FT
Events that transpired over the last 24 hours were reviewed.   Overall feeling little bit better compared to the prior day following HD.  Denies any chest pain/tightness/discomfort at rest or upon minimal exertion.  Does feel dyspneic upon minimal exertion.  Currently denies any fevers, chills, sweats, lighted sensation, dizziness or palpitations.  Agree with 14 point review of systems and physical examination as noted above.    Patient presented with syncope which may be secondary to underlying aortic valve stenosis.  Prior to proceeding with valve replacement the patient will need to be medically optimized with plan for her to undergo placement of AV Micra prior to the TAVR intervention.  Plan for AV Micra to be placed on 4/22/2024 for 2-1 AV block with known first-degree AV block and left bundle branch block.    Unclear etiology of diffuse rash all over her body that in certain areas are pustular after receiving CT scan contrast patient to be seen by infectious disease.  Unclear if this was a delayed reaction to contrast.  Patient being seen by allergy.  Plan for patient to get premedicated with steroids/antihistamine prior to contrast administration.    Patient being seen by dermatology for assessment/medical optimization of lymphedema and diffuse pustular rash.  Patient underwent biopsy on 4/17/2024    CT scan demonstrated adrenal nodule which will need to be further assessed.  Undergoing workup as per endocrinology.    Patient being followed by nephrology for potential initiation of dialysis.  Status post IR placement of nontunneled HD catheter.  HD initiated on 4/18.    Findings and plan were discussed with medicine, electrophysiology, cardiac surgery and nephrology teams.    Time-based billing (NON-critical care).   35 minutes spent on total encounter. The necessity of the time spent during the encounter on this date of service was due to: education, assessment and coordination of care.
Events that transpired over the last 24 hours were reviewed.  Patient was febrile and hypotensive overnight.  Also was vomiting which is nonbloody in nature.  Currently denies any fevers, chills, sweats, lighted sensation, dizziness or palpitations.  Agree with 14 point review of systems and physical examination as noted above.    Patient presented with syncope which may be secondary to underlying aortic valve stenosis.  Prior to proceeding with valve replacement the patient will need to be medically optimized with plan for her to undergo placement of AV Micra prior to the TAVR intervention.    Unclear etiology of diffuse rash all over her body that in certain areas are pustular after receiving CT scan contrast patient to be seen by infectious disease.  Unclear if this was a delayed reaction to contrast.    Recommend patient be seen by dermatology for assessment/medical optimization of lymphedema.    CT scan demonstrated adrenal nodule which will need to be further assessed.    Patient being followed by nephrology for potential initiation of dialysis.    Findings and plan were discussed with medicine, electrophysiology, cardiac surgery and nephrology teams.    Time-based billing (NON-critical care).   35 minutes spent on total encounter. The necessity of the time spent during the encounter on this date of service was due to: education, assessment and coordination of care.
Doing well s/p Micra
Events that transpired over the last 24 hours were reviewed.  Patient was noted to have some heart block overnight/early this morning for which she underwent placement of an AV Micra by electrophysiology team.  She continues to be intubated and currently undergoing CVVH.  Plan for patient to be extubated later today.  Agree with assessment/plan as noted above.  Patient at increased risk for infection.  Closely monitor for any signs and symptoms of infectious process.  Closely monitor groin sites.  Continue telemetry monitoring.    All questions and concerns of the patient and her partner/Catherine were reviewed    Findings and plan were discussed with CICU/Dr. Quintero, nephrology/Dr. Merino and cardiac surgery/Dr. Mcknight.    Time-based billing (NON-critical care).   35 minutes spent on total encounter. The necessity of the time spent during the encounter on this date of service was due to: education, assessment and coordination of care.
No acute events reported overnight.  The patient reports that her breathing is improved following dialysis.  However she continues to feel very weak and has largely just gone from the bed to the commode.  Denies any fevers, chills, sweats, light sensation, dizzy or palpitation.  Notes that the heaviness in her legs has significantly improved.  Agree with 14 point review of systems and physical examination as noted above.    --Patient presented with syncope which may be secondary to underlying aortic valve stenosis.  Prior to proceeding with valve replacement the patient will need to be medically optimized with plan for her to undergo placement of AV Micra prior to the TAVR intervention.  Plan for AV Micra to be placed on 4/22/2024 for 2-1 AV block with known first-degree AV block and left bundle branch block.  --Discussed with the patient findings on imaging studies.  The patient has a low height of her left main along with severe aortic valve stenosis.  The various treatment options were gone over including open heart surgery versus TAVR with potential PCI.  The pros/cons and risk/benefits of the various treatment options were gone over.  Due to the patient's age and comorbidities she is felt to be high risk and appropriate patient to be worked up for TAVR with potential PCI/snorkeling.  Indications and details for these procedures were reviewed.  Benefits and risks were discussed.  Risks include but not limited to infection, bleeding, arrhythmia, TIA/stroke, hemodynamic instability, vascular injury, coronary obstruction, myocardial infarction, need for urgent surgery and death.  The patient understands that she is higher risk for TAVR/PCI due to her anatomy/comorbidities.  The patient is not felt to be a surgical candidate.  Plan for TAVR/PCI to be performed on Wednesday, 4/24/2024.  --Unclear etiology of diffuse rash all over her body that in certain areas are pustular after receiving CT scan contrast patient to be seen by infectious disease.  Unclear if this was a delayed reaction to contrast.  Patient being seen by allergy.  Plan for patient to get premedicated with steroids/antihistamine prior to contrast administration.  --Patient being seen by dermatology for assessment/medical optimization of lymphedema and diffuse pustular rash.  Patient underwent biopsy on 4/17/2024  --CT scan demonstrated adrenal nodule which will need to be further assessed.  Undergoing workup as per endocrinology.  --Patient being followed by nephrology for potential initiation of dialysis.  Status post IR placement of nontunneled HD catheter.  HD initiated on 4/18.    All questions or concerns of the patient were addressed.  Plan for patient to undergo TAVR/PCI on Wednesday, 4/24/2024.  Appreciate assistance from medical teams for medical optimization prior to the procedure.  The patient will need to be premedicated with steroids/Benadryl due to concern for potential contrast reaction.    Findings and plan were discussed with electrophysiology, nephrology and cardiac surgery teams.    Time-based billing (NON-critical care).   35 minutes spent on total encounter. The necessity of the time spent during the encounter on this date of service was due to: education, assessment and coordination of care.
Seen, examined with, formulated plan with and  agree with above as scribed by NP Rody [Joe]     lungs no rales   ext 2- edema     R[place] Maria   cr is improving   cont midodrine for hypotension   eventually will need diuretics in near future   PT
formulated plan with and  agree with above as scribed by NP Rody [Joe]     + diffuse macular rash   heart RRR  + M   lungs decrease bs ? rales right base  ext anasarca       ted   chf   severe AS   Rash   hyperkalemia   uremia    d/w pt HD given has had worsening renal function and to receive lokelma  hd cath placement by IR. d/w IR team   derm follow up   ? steroids no contraindication by ID for steroids   dc bumex  hd today first tx
formulated plan with and  agree with above as scribed by NP Rody [Joe]     still with c.o dizziness   lungs no rales   heart RRR II/VI M   ext + edema     CKD IV now   lymphedema  hx chf   orthostatic hypotension    cr steady   holding diuretics but will eventually need to restart in near future   agree with addition of northera   cont midodrine as above   needs PT as well given she has been bedbound since admission due to her illness
AV Wenckebach overnight responsive to atropine. WIll plan for AV Micra for backup pacing.
Seen, examined with, formulated plan with and  agree with above as scribed by NP Rody [Joe]       n/v and UTI   now recurrent DUNCAN   to receive ivf hydration   trend cr and lytes   anti emetics  rocephin 1 g daily
Episodes of heart block now resolved since 4/14 after cessation of beta blocker with near normalization of LA. Very likely representing lulu block in the setting of high dose beta blockers, despite left bundle. Given the multiple possible other etiologies of syncopal event and high risk of complications from Micra implant (both procedural and as a result of likely RV pacing in the setting of LV dysfunction) will defer implantation unless onset of heart block during TAVR. Will follow tomorrow during TAVR procedure.
Events that transpired over the last 24 hours were reviewed.   Overall feeling little bit better compared to the prior day.  Denies any chest pain/tightness/discomfort at rest or upon minimal exertion.  Does feel dyspneic upon minimal exertion.  Currently denies any fevers, chills, sweats, lighted sensation, dizziness or palpitations.  Agree with 14 point review of systems and physical examination as noted above.    Patient presented with syncope which may be secondary to underlying aortic valve stenosis.  Prior to proceeding with valve replacement the patient will need to be medically optimized with plan for her to undergo placement of AV Micra prior to the TAVR intervention.    Unclear etiology of diffuse rash all over her body that in certain areas are pustular after receiving CT scan contrast patient to be seen by infectious disease.  Unclear if this was a delayed reaction to contrast.  Patient to be seen by allergy.    Patient being seen by dermatology for assessment/medical optimization of lymphedema and diffuse pustular rash.  Patient underwent biopsy on 4/17/2024    CT scan demonstrated adrenal nodule which will need to be further assessed.    Patient being followed by nephrology for potential initiation of dialysis.  Plan for IR to place nontunneled HD catheter.    Findings and plan were discussed with medicine, electrophysiology, cardiac surgery and nephrology teams.    Time-based billing (NON-critical care).   35 minutes spent on total encounter. The necessity of the time spent during the encounter on this date of service was due to: education, assessment and coordination of care.
Events that transpired overnight were reviewed.  Patient feeling very fatigued.  Unable to ambulate short distance with physical therapy during which time her legs gave out.  No trauma noted.  Denies any cardiopulmonary complaints at rest.  Overall she feels that her legs are less heavy but she is weaker.  Agree with 14 point review of systems and physical examination as noted above.  Patient status post TAVR with multiple comorbidities.  Appreciate assistance from consulting services for medical optimization.  Patient being transitioned to hemodialysis.  Uptitrate medications as tolerated.  Continue aspirin 81 mg daily.  Recommend antibiotic prophylaxis prior to any upcoming procedure.    All questions and concerns of the patient were addressed    Findings and plan were discussed with CICU/Dr. Sandoval.    Time-based billing (NON-critical care).   35 minutes spent on total encounter. The necessity of the time spent during the encounter on this date of service was due to: education, assessment and coordination of care.
High fever last night. Discussed case in detail with Shaun Alston and Jasbir. Needs ID workup prior to any device placement. The only EP option in next week would be consideration of AV Micra. She cannot lie flat and is too ill to even undergo that, more so with fever. Await blood cultures. Her prognosis is poor, despite her age
Seen, formulated plan with and  agree with above as scribed by NP Rody [Joe]     DUNCAN on ckd  CHF   now orthostatic and hyperglycemia with high AGAP  beta hydroxy is +   hyperglycemia control   gentle IVF today   midodrine to cont for orthostatic hypotension
formulated plan with and  agree with above as scribed by NP Rody [Joe]     lungs no rales   heart RRR II/VI M   ext + edema     CKD IV likely   orthostatic hypotension   anemia    dc retacrit given hb higher  cr steady   florinef and midodrine with northera as above   PT needed
9.1    9.61  )-----------( 251      ( 10 May 2024 07:13 )             29.4       CBC Full  -  ( 10 May 2024 07:13 )  WBC Count : 9.61 K/uL  RBC Count : 3.10 M/uL  Hemoglobin : 9.1 g/dL  Hematocrit : 29.4 %  Platelet Count - Automated : 251 K/uL  Mean Cell Volume : 94.8 fl  Mean Cell Hemoglobin : 29.4 pg  Mean Cell Hemoglobin Concentration : 31.0 gm/dL  Auto Neutrophil # : 6.48 K/uL  Auto Lymphocyte # : 0.58 K/uL  Auto Monocyte # : 0.98 K/uL  Auto Eosinophil # : 1.45 K/uL  Auto Basophil # : 0.05 K/uL  Auto Neutrophil % : 67.5 %  Auto Lymphocyte % : 6.0 %  Auto Monocyte % : 10.2 %  Auto Eosinophil % : 15.1 %  Auto Basophil % : 0.5 %      05-10    135  |  92<L>  |  67<H>  ----------------------------<  110<H>  4.0   |  23  |  3.66<H>    Ca    8.4      10 May 2024 07:13  Phos  5.9     05-10  Mg     2.2     05-10    TPro  6.9  /  Alb  3.2<L>  /  TBili  0.6  /  DBili  x   /  AST  23  /  ALT  8<L>  /  AlkPhos  89  05-09      CAPILLARY BLOOD GLUCOSE      POCT Blood Glucose.: 180 mg/dL (10 May 2024 17:08)  POCT Blood Glucose.: 72 mg/dL (10 May 2024 12:50)  POCT Blood Glucose.: 110 mg/dL (10 May 2024 08:44)  POCT Blood Glucose.: 203 mg/dL (09 May 2024 21:57)      Vital Signs Last 24 Hrs  T(C): 36.2 (10 May 2024 11:49), Max: 36.6 (09 May 2024 21:29)  T(F): 97.2 (10 May 2024 11:49), Max: 97.9 (09 May 2024 21:29)  HR: 71 (10 May 2024 17:38) (63 - 72)  BP: 114/64 (10 May 2024 17:38) (114/64 - 154/70)  BP(mean): --  RR: 18 (10 May 2024 11:49) (18 - 18)  SpO2: 100% (10 May 2024 17:38) (96% - 100%)    Parameters below as of 10 May 2024 17:38  Patient On (Oxygen Delivery Method): nasal cannula  O2 Flow (L/min): 2      Urinalysis Basic - ( 10 May 2024 07:13 )    Color: x / Appearance: x / SG: x / pH: x  Gluc: 110 mg/dL / Ketone: x  / Bili: x / Urobili: x   Blood: x / Protein: x / Nitrite: x   Leuk Esterase: x / RBC: x / WBC x   Sq Epi: x / Non Sq Epi: x / Bacteria: x        MEDICATIONS  (STANDING):  ammonium lactate 12% Lotion 1 Application(s) Topical <User Schedule>  aspirin  chewable 81 milliGRAM(s) Oral daily  atorvastatin 80 milliGRAM(s) Oral at bedtime  calamine/zinc oxide Lotion 1 Application(s) Topical three times a day  epoetin mendoza-epbx (RETACRIT) Injectable 39391 Unit(s) SubCutaneous every 7 days  ferrous    sulfate 325 milliGRAM(s) Oral two times a day  heparin   Injectable 5000 Unit(s) SubCutaneous every 8 hours  insulin glargine Injectable (LANTUS) 18 Unit(s) SubCutaneous at bedtime  insulin lispro (ADMELOG) corrective regimen sliding scale   SubCutaneous three times a day before meals  insulin lispro (ADMELOG) corrective regimen sliding scale   SubCutaneous at bedtime  insulin lispro Injectable (ADMELOG) 2 Unit(s) SubCutaneous three times a day with meals  levothyroxine 75 MICROGram(s) Oral daily  midodrine. 10 milliGRAM(s) Oral every 8 hours  Nephro-molly 1 Tablet(s) Oral daily  senna 2 Tablet(s) Oral at bedtime
Events that transpired over the last 24 hours were reviewed.  The patient reports that she is clinically doing better.  Her legs feel less heavy and she has less shortness of breath.  Denies any fevers, chills, sweats, light sensation, dizzy or palpitations.  Continues to still be on CVVH.  Agree with 14 point review of systems and physical examination as noted above.    The patient is status post TAVR.  Appreciate assistance from consulting services concerning the patient's multitude of clinical conditions and medical optimization prior to and following her TAVR intervention.  Repeat blood cultures on 1/8 were reviewed by ID.  At this time antibiotics/vancomycin have been discontinued.  Continue CRRT as per nephrology with pressor support with vasopressin.    Continue telemetry monitoring.    All questions and concerns of the patient were addressed.    Findings and plan were discussed with CTU team.
Pt seen and examined with ACP.  Assessment and plan reviewed and discussed.  Agree with above.    Status of wounds and treatment recommendations d/w  pt.  All questions answered.   Pt expressed understanding.    I spent 50  minutes face to face w/ this pt of which more than 50% of the time was spent counseling & coordinating care of this pt.
Seen  formulated plan with and  agree with above as scribed by NP Rody [Joe]     rash slightly fading   lungs decrease   ext anasarca      DUNCAN on CKD   CHF   lymphedema   severe AS   HD F160 2.5 hr 1 liter fluid removal 2 k bath bfr 250 cc dfr 600
Seen, examined with, formulated plan with and  agree with above as scribed by NP Rody [Joe]       + flushed/ facial rash ?   + psoriatric type rash LE   lungs decrease bs   ext 4+ edema      CKD IV egfr approx 13-18 cc /min   bumex drip to start if bp stable   hypotension o/n with fevers  ? rash from contrast ? from fevers   strict I/O  trend lytes   all meds to be adjusted for egfr as above including ab  d/w pt primary team
Seen, examined with, formulated plan with and  agree with above as scribed by NP Rody [Joe]     lungs decrease bs  L sided +  rales   heart RRR II/VI   ext anasarca     1- CKD IV  2- CHF   3- SEVERE AS       cr steady   cont bumex drip 0.5 mg/hr unable to give IVF pre IV contrast given pt with significant fluid overload  dialysis/CROW risk has been d/w pt and pt agreed to proceed with hd  if needed  for now monitor  pt also informed she had been on hd in past after sepsis albeit a short hd session
Seen, examined with, formulated plan with and  agree with above as scribed by NP Rody [Joe]     on IVF when seen   weight increased over days albeit bed weight only   has orthostatic   cr steady range   repeat echo with ef slightly better   UTI in this pt does not explain her orthostasis   dc ivf   cont midodrine with increase dose 20 mg tid   cont northera 600 mg tid   decrease florinef due to fluid retention
Seen, examined with, formulated plan with and  agree with above as scribed by NP Rody [Pernick]     still orthostatic and dizzy   c/o edema  LUE > LE     lungs no rales   LUE 4+ edema with erythema  b.l LE edema 2+       CKD III   CH F  OH [ orthostatic hypotension ]    cr overall better  goal to dc florinef  cont midodrine and mestinon   increase northera 600 mg tid
formulated plan with and  agree with above as scribed by NP Rody [Joe]     lungs no rales   heart RRR II/VI M   ext 2- edema         CKD IV  orthostatic hypotension   chf   holding diuretics given lower bp  bp still low upon sitting cont northera and midodrine as above  add florinef 0.1 mg bid  cont hardy for urinary retention ?
Events that transpired over the last 48 hours were reviewed.  The patient denies any dizziness however feels fatigued laying in the bed.  Denies any fevers, chills, sweats, lightheaded sensation, dizziness or palpitations.  Agree with 14 point review of systems and physical examination as noted above.  Telemetry concerning for Mobitz type II heart block.    The patient has been seen by the nephrology team.  Discussion about what his dialysis tells has occurred.  Patient is agreeable to started on dialysis if clinically indicated.  She is aware that dialysis would likely for the rest of her life.    Once again reviewed with patient her multiple comorbidities that are contributing to her clinical status which include but are not limited to her nonischemic cardiomyopathy with significant cardiac dysfunction, chronic lymphedema, stage IV kidney disease and morbid obesity.    The patient was very clear that she wants everything to be done.  She would like to be further assessed by the valve team.  As part of the patient assessment it is recommended that she proceed with heart CTA.  Indications and details for the heart CTA was gone over.  She was aware that contrast administration will lead to worsening kidney function and the need for dialysis.  The patient is agreeable to starting classes.  Based upon findings of the heart CTA and recommendations from her consulting medical team will determine potential timing for TAVR.    Recommend patient be seen by the electrophysiology team due to concern for heart block noted on telemetry.  At baseline the patient has left bundle branch block.  Patient may benefit from some pacemaker device.  Increased risk of infection unclear if a CRT device would be beneficial for patient suitable for AV Micra due to her comorbidities.    Recommend patient be seen by dermatology to assist in medical optimization both acutely and long-term to decrease risk of infection and controlling her psoriasis.  Patient to be seen by wound care later today.    All questions and concerns of the patient were addressed.    Plan were discussed with medicine team, cardiology, cardiac surgery and nephrology.

## 2024-06-10 NOTE — PROGRESS NOTE ADULT - SUBJECTIVE AND OBJECTIVE BOX
[facility] - Howard Kimble, Cherise, Carlos, Herman Alston  Office Number:  [phone]    Patient resting comfortably in bed in NAD.  Laying flat with no respiratory distress.  No complaints of chest pain, dyspnea, palpitations, PND, or orthopnea.    ROS: negative unless otherwise mentioned.    Telemetry:  sr    MEDICATIONS  (STANDING):  ammonium lactate 12% Lotion 1 Application(s) Topical <User Schedule>  apixaban 5 milliGRAM(s) Oral two times a day  aspirin  chewable 81 milliGRAM(s) Oral daily  atorvastatin 80 milliGRAM(s) Oral at bedtime  calamine/zinc oxide Lotion 1 Application(s) Topical three times a day  dextrose 10% Bolus 125 milliLiter(s) IV Bolus once  dextrose 5%. 1000 milliLiter(s) (100 mL/Hr) IV Continuous <Continuous>  dextrose 5%. 1000 milliLiter(s) (50 mL/Hr) IV Continuous <Continuous>  dextrose 50% Injectable 25 Gram(s) IV Push once  dextrose 50% Injectable 12.5 Gram(s) IV Push once  droxidopa 600 milliGRAM(s) Oral three times a day  ferrous    sulfate 325 milliGRAM(s) Oral two times a day  fludroCORTISONE 0.1 milliGRAM(s) Oral daily  glucagon  Injectable 1 milliGRAM(s) IntraMuscular once  insulin glargine Injectable (LANTUS) 12 Unit(s) SubCutaneous <User Schedule>  insulin lispro (ADMELOG) corrective regimen sliding scale   SubCutaneous three times a day before meals  insulin lispro (ADMELOG) corrective regimen sliding scale   SubCutaneous <User Schedule>  insulin lispro Injectable (ADMELOG) 4 Unit(s) SubCutaneous before breakfast  insulin lispro Injectable (ADMELOG) 4 Unit(s) SubCutaneous before lunch  insulin lispro Injectable (ADMELOG) 3 Unit(s) SubCutaneous before dinner  levothyroxine 75 MICROGram(s) Oral daily  midodrine. 30 milliGRAM(s) Oral <User Schedule>  midodrine. 20 milliGRAM(s) Oral <User Schedule>  Nephro-molly 1 Tablet(s) Oral daily  pantoprazole    Tablet 40 milliGRAM(s) Oral every 24 hours  psyllium Powder 1 Packet(s) Oral three times a day  pyridostigmine 30 milliGRAM(s) Oral <User Schedule>  triamcinolone 0.1% Ointment 1 Application(s) Topical two times a day    MEDICATIONS  (PRN):  dextrose Oral Gel 15 Gram(s) Oral once PRN Blood Glucose LESS THAN 70 milliGRAM(s)/deciliter      Allergies    contrast media (iodine-based) (Fever; Vomiting; Flushing; Hypotension; Rash; Stomach Upset)  Ativan (Rash; Urticaria)  penicillins (Hives (Mod to Severe); Anaphylaxis (Mod to Severe); Short breath (Mod to Severe); Angioedema (Mod to Severe))    Intolerances        Vital Signs Last 24 Hrs  T(C): 36.7 (10 Frederic 2024 05:05), Max: 36.7 (09 Jun 2024 20:18)  T(F): 98 (10 Frederic 2024 05:05), Max: 98.1 (09 Jun 2024 20:18)  HR: 79 (10 Frederic 2024 05:05) (67 - 81)  BP: 132/66 (10 Frederic 2024 05:05) (130/70 - 132/66)  BP(mean): --  RR: 18 (10 Frederic 2024 05:05) (18 - 18)  SpO2: 96% (10 Frederic 2024 05:05) (96% - 100%)    Parameters below as of 10 Frederic 2024 05:05  Patient On (Oxygen Delivery Method): room air        I&O's Summary    09 Jun 2024 07:01  -  10 Frederic 2024 07:00  --------------------------------------------------------  IN: 420 mL / OUT: 400 mL / NET: 20 mL        ON EXAM:    Constitutional: obese, NAD   HEENT:  MMM, sclerae anicteric, conjunctivae clear, no oral cyanosis.  Pulmonary: Non-labored, breath sounds are clear bilaterally, No wheezing, rales or rhonchi  Cardiovascular: Regular, S1 and S2.  No murmur.  No rubs, gallops or clicks  Gastrointestinal: Bowel Sounds present, soft, nontender.   Lymph: No obv peripheral edema. legs wrapped with ace bandages  Neurological: Alert, no focal deficits  Skin: No rashes.  Psych:  Mood & affect appropriate    LABS: All Labs Reviewed:    09 Jun 2024 12:05    134    |  100    |  26     ----------------------------<  395    5.3     |  18     |  1.63     Ca    8.8        09 Jun 2024 12:05  Phos  4.0       09 Jun 2024 12:05  Mg     2.0       09 Jun 2024 12:05            Blood Culture:

## 2024-06-10 NOTE — PROGRESS NOTE ADULT - NS ATTEND OPT1 GEN_ALL_CORE
I independently performed the documented:
I attest my time as attending is greater than 50% of the total combined time spent on qualifying patient care activities by the PA/NP and attending.
I independently performed the documented:
I attest my time as attending is greater than 50% of the total combined time spent on qualifying patient care activities by the PA/NP and attending.
I attest my time as attending is greater than 50% of the total combined time spent on qualifying patient care activities by the PA/NP and attending.
I independently performed the documented:
I independently performed the documented:
I attest my time as attending is greater than 50% of the total combined time spent on qualifying patient care activities by the PA/NP and attending.
I independently performed the documented:
I attest my time as attending is greater than 50% of the total combined time spent on qualifying patient care activities by the PA/NP and attending.
I independently performed the documented:
I attest my time as attending is greater than 50% of the total combined time spent on qualifying patient care activities by the PA/NP and attending.

## 2024-06-10 NOTE — PROGRESS NOTE ADULT - PROBLEM SELECTOR PLAN 2
Thyroid Function Tests:  05-28 @ 06:42 TSH 10.20 FreeT4 1.0 T3 -- Anti TPO -- Anti Thyroglobulin Ab -- TSI --  - Continue home LT4 75mcg daily. Noted LT4 given with Protonix which inhibits LT4 absorption causing TSH levels to go up.   - PLEASE make sure LT4 is given on an empty stomach at least one hour apart from other meds and food to ensure med absorption. DO NOT GIVE WITH VITAMINS/ANTACIDS. Noted now pt getting LT4 and PPI at different times.  - Repeat TSH and Free T 4 in 4-6 weeks

## 2024-06-11 NOTE — PROGRESS NOTE ADULT - ASSESSMENT
73F w HFrEF (EF 30%), mod AS, known LBBB, HTN, IDDM1, CKD, hypothyroidism, chronic lymphedema, p/w 1 episode of syncope with R arm jerking.     #Syncope-Aortic Stenosis  - AS in setting of decreased LVEF  - s/p tavr on 4/24 c/b VT -> shock -> VFib -> shock  - hypoxic/congested, and was intubated, extubated on 4/25  - on 4/25 with worsening bradycardia, s/p TVP placement followed by AV Micra placement   - Remains in Sinus Rhythm/known lbbb with intermittent   - on high dose midodrine, droxidopa and florinef in the setting of orthostasis.  - TTE on 5/21 with global LV dysfunction severely decreased. Well seated TAVR, limited echo 6/4 with unchanged mod lv and no sig changes overall  - to consider CRT-D in the future  - Cannot initiate GDMT due to orthostasis  - would try to mobilize as best as possible and monitor orthostatics, hopefully standing bp can be kept >100.   - cont asa and statin  - Hgb stable     #Chronic Systolic HF (EF 30%), mod to severe AS, HTN, LBBB, Lymphedema   - Has known systolic HF and AS.    - Repeat TTE showed EF 30%, mild-mod LVH, mildly reduced RVF, mod-severe AS (.61 cmsq), mod pHTN  - Repeat limited study with normal function TAVR  - On home Torsemide 20 mg daily, Eplerenone 25 mg daily, and Toprol  mg daily, all currently on hold  - On Midodrine 30mg TID for orthostatic hypotension  - Droxidopa now initiated as well for significant orthostasis upto 600mg TID  - Unable to initiate GDMT at this time due to significant orthostatic hypotension  - initially CVVHD, then HD  - HD now on hold, as she is urinating and creatinine remains stable,   - Creatinine improving  - prn iv bumex  - Diagnosed with UE DVT and now on full loading dose of Eliquis  - PT and Bed to chair as much as possible    #Orthostatic hypotension.   - would attempt orthostatics daily.  Hoping to wean off midodrine in order to restart GDMT. Orthostatics improving from supine to seated, but still problematic with standing. Remains orthostatic and symptomatic on standing  - Repeat othostatics-improved-Florinef decreased 2/2 increased edema, midodrine increased to 30mgBID 20mg OD as still symptomatic with standing  - Overall symptoms are markedly improving, though gradually. Still orthostatic as of 6/10  - On Pyridostigmine 60md TID, increased on 6/10  - Repeat orthostatics and OOB     # Atrial Fibrillation  - Reported episode over the weekend (6/1-6/2)  - Is on AC for DVT  - Will need outpatient monitoring  - Continue telemetry    #UTI  - Resolved  - Abx as per ID

## 2024-06-11 NOTE — PROGRESS NOTE ADULT - SUBJECTIVE AND OBJECTIVE BOX
INTERVAL HPI/OVERNIGHT EVENTS:    no new events               MEDICATIONS  (STANDING):  ammonium lactate 12% Lotion 1 Application(s) Topical <User Schedule>  apixaban 5 milliGRAM(s) Oral two times a day  aspirin  chewable 81 milliGRAM(s) Oral daily  atorvastatin 80 milliGRAM(s) Oral at bedtime  calamine/zinc oxide Lotion 1 Application(s) Topical three times a day  dextrose 10% Bolus 125 milliLiter(s) IV Bolus once  dextrose 5%. 1000 milliLiter(s) (100 mL/Hr) IV Continuous <Continuous>  dextrose 5%. 1000 milliLiter(s) (50 mL/Hr) IV Continuous <Continuous>  dextrose 50% Injectable 25 Gram(s) IV Push once  dextrose 50% Injectable 12.5 Gram(s) IV Push once  droxidopa 600 milliGRAM(s) Oral three times a day  ferrous    sulfate 325 milliGRAM(s) Oral two times a day  fludroCORTISONE 0.1 milliGRAM(s) Oral daily  glucagon  Injectable 1 milliGRAM(s) IntraMuscular once  insulin glargine Injectable (LANTUS) 12 Unit(s) SubCutaneous once  insulin lispro (ADMELOG) corrective regimen sliding scale   SubCutaneous three times a day before meals  insulin lispro (ADMELOG) corrective regimen sliding scale   SubCutaneous <User Schedule>  insulin lispro Injectable (ADMELOG) 3 Unit(s) SubCutaneous three times a day before meals  levothyroxine 75 MICROGram(s) Oral daily  midodrine. 30 milliGRAM(s) Oral <User Schedule>  midodrine. 20 milliGRAM(s) Oral <User Schedule>  Nephro-molly 1 Tablet(s) Oral daily  pantoprazole    Tablet 40 milliGRAM(s) Oral every 24 hours  psyllium Powder 1 Packet(s) Oral three times a day  pyridostigmine 30 milliGRAM(s) Oral <User Schedule>  triamcinolone 0.1% Ointment 1 Application(s) Topical two times a day    MEDICATIONS  (PRN):  dextrose Oral Gel 15 Gram(s) Oral once PRN Blood Glucose LESS THAN 70 milliGRAM(s)/deciliter      Allergies    contrast media (iodine-based) (Fever; Vomiting; Flushing; Hypotension; Rash; Stomach Upset)  Ativan (Rash; Urticaria)  penicillins (Hives (Mod to Severe); Anaphylaxis (Mod to Severe); Short breath (Mod to Severe); Angioedema (Mod to Severe))    Intolerances            General:  No wt loss, fevers, chills, night sweats, fatigue,   Eyes:  Good vision, no reported pain  ENT:  No sore throat, pain, runny nose, dysphagia  CV:  No pain, palpitations, hypo/hypertension  Resp:  No dyspnea, cough, tachypnea, wheezing  GI:  No pain, No nausea, No vomiting, No diarrhea, No constipation, No weight loss, No fever, No pruritis, No rectal bleeding, No tarry stools, No dysphagia,  :  No pain, bleeding, incontinence, nocturia  Muscle:  No pain, weakness  Neuro:  No weakness, tingling, memory problems  Psych:  No fatigue, insomnia, mood problems, depression  Endocrine:  No polyuria, polydipsia, cold/heat intolerance  Heme:  No petechiae, ecchymosis, easy bruisability  Skin:  No rash, tattoos, scars, edema        PHYSICAL EXAM  Vital Signs Last 24 Hrs  T(C): 36.6 (09 Jun 2024 04:52), Max: 36.7 (08 Jun 2024 20:18)  T(F): 97.8 (09 Jun 2024 04:52), Max: 98 (08 Jun 2024 20:18)  HR: 77 (09 Jun 2024 04:52) (66 - 88)  BP: 128/65 (09 Jun 2024 04:52) (126/72 - 157/69)  BP(mean): --  RR: 18 (09 Jun 2024 04:52) (18 - 18)  SpO2: 94% (09 Jun 2024 04:52) (94% - 98%)    Parameters below as of 09 Jun 2024 04:52  Patient On (Oxygen Delivery Method): room air        06-08-24 @ 07:01  -  06-09-24 @ 07:00  --------------------------------------------------------  IN: 560 mL / OUT: 2400 mL / NET: -1840 mL            GENERAL:  Appears stated age, well-groomed, well-nourished, no distress  HEENT:  NC/AT,  conjunctivae clear and pink, no thyromegaly, nodules, adenopathy, no JVD, sclera -anicteric  CHEST:  Full & symmetric excursion, no increased effort, breath sounds clear  HEART:  Regular rhythm, S1, S2, no murmur/rub/S3/S4, no abdominal bruit, no edema  ABDOMEN:  Soft, non-tender, non-distended, normoactive bowel sounds,  no masses ,no hepato-splenomegaly, no signs of chronic liver disease  EXTEREMITIES:  no cyanosis,clubbing or edema  SKIN:  No rash/erythema/ecchymoses/petechiae/wounds/abscess/warm/dry  NEURO:  Alert, oriented, no asterixis, no tremor, no encephalopathy          LABS:            mL          amylase   lipase  RADIOLOGY & ADDITIONAL TESTS:

## 2024-06-11 NOTE — PROGRESS NOTE ADULT - PROBLEM SELECTOR PLAN 5
Home regimen: lantus 33u qhs w/ premeal sliding scale. A1c 7.4% in 3/2024. multiple hypoglycemic episodes, DKA now resolved, anion gap closed.  Close discussions with endocrine ongoing regarding management.  Hypoglycemic in AM several times now since discharge from MICU.  Appears to have more controlled blood sugars    -overnight 6/8 lantus held -> will continue basal insulin in AM  - premeal and bedtime fingersticks  - hypoglycemia protocol.    PLAN  - LA: 12units AM, SA: 4/4/3, SSI Home regimen: lantus 33u qhs w/ premeal sliding scale. A1c 7.4% in 3/2024. multiple hypoglycemic episodes, DKA now resolved, anion gap closed.  Close discussions with endocrine ongoing regarding management.  Hypoglycemic in AM several times now since discharge from MICU.  Appears to have more controlled blood sugars  6/10- B-300   -overnight  lantus held -> will continue basal insulin in AM  - premeal and bedtime fingersticks  - hypoglycemia protocol.    PLAN  - LA: 12units AM, SA: 5/4/3, SSI

## 2024-06-11 NOTE — PROGRESS NOTE ADULT - SUBJECTIVE AND OBJECTIVE BOX
Patient seen today for follow up inpatient Diabetes Mellitus management.    Chief Complaint: Type 1 Diabetes Mellitus     INTERVAL HX:  Patient seen in [facility] 3DSU 343 W1. Patient is alert and oriented, sitting up in bed. BG have been stable and mostly at goal 100-180mg/dL while on a Consistent Carbohydrate Diet. Patient has been tolerating PO diet well, PO intake is variable making BG levels inconsitent. Blood glucose levels in the last 24hrs have been 137-322mf/dL.     Review of Systems:  General: As above.  Respiratory: Denies any SOB, CURTIS, or cough.  Gastrointestinal: Denies any n/v/d or abdominal pain.   Endocrine: Denies any polyuria, polydpsia, polyphagia, visual changes, or numbness in feet.     Allergies  contrast media (iodine-based) (Fever; Vomiting; Flushing; Hypotension; Rash; Stomach Upset)  Ativan (Rash; Urticaria)  penicillins (Hives (Mod to Severe); Anaphylaxis (Mod to Severe); Short breath (Mod to Severe); Angioedema (Mod to Severe))    Intolerances  None.     MEDICATIONS  (STANDING):  ammonium lactate 12% Lotion 1 Application(s) Topical <User Schedule>  apixaban 5 milliGRAM(s) Oral two times a day  aspirin  chewable 81 milliGRAM(s) Oral daily  atorvastatin 80 milliGRAM(s) Oral at bedtime  calamine/zinc oxide Lotion 1 Application(s) Topical three times a day  dextrose 10% Bolus 125 milliLiter(s) IV Bolus once  dextrose 5%. 1000 milliLiter(s) IV Continuous <Continuous>  dextrose 5%. 1000 milliLiter(s) IV Continuous <Continuous>  dextrose 50% Injectable 25 Gram(s) IV Push once  dextrose 50% Injectable 12.5 Gram(s) IV Push once  dextrose Oral Gel 15 Gram(s) Oral once PRN  droxidopa 400 milliGRAM(s) Oral three times a day  ferrous    sulfate 325 milliGRAM(s) Oral two times a day  fludroCORTISONE 0.1 milliGRAM(s) Oral daily  glucagon  Injectable 1 milliGRAM(s) IntraMuscular once  insulin glargine Injectable (LANTUS) 12 Unit(s) SubCutaneous at bedtime  insulin lispro (ADMELOG) corrective regimen sliding scale   SubCutaneous three times a day before meals  insulin lispro (ADMELOG) corrective regimen sliding scale   SubCutaneous <User Schedule>  insulin lispro Injectable (ADMELOG) 5 Unit(s) SubCutaneous before lunch  insulin lispro Injectable (ADMELOG) 3 Unit(s) SubCutaneous before dinner  levothyroxine 75 MICROGram(s) Oral daily  midodrine. 30 milliGRAM(s) Oral <User Schedule>  midodrine. 20 milliGRAM(s) Oral <User Schedule>  Nephro-molly 1 Tablet(s) Oral daily  pantoprazole    Tablet 40 milliGRAM(s) Oral every 24 hours  psyllium Powder 1 Packet(s) Oral three times a day  pyridostigmine 60 milliGRAM(s) Oral <User Schedule>  triamcinolone 0.1% Ointment 1 Application(s) Topical two times a day      atorvastatin 80 milliGRAM(s) Oral at bedtime  dextrose 50% Injectable 25 Gram(s) IV Push once  dextrose 50% Injectable 12.5 Gram(s) IV Push once  dextrose Oral Gel 15 Gram(s) Oral once PRN  fludroCORTISONE 0.1 milliGRAM(s) Oral daily  glucagon  Injectable 1 milliGRAM(s) IntraMuscular once  insulin glargine Injectable (LANTUS) 12 Unit(s) SubCutaneous at bedtime  insulin lispro (ADMELOG) corrective regimen sliding scale   SubCutaneous three times a day before meals  insulin lispro (ADMELOG) corrective regimen sliding scale   SubCutaneous <User Schedule>  insulin lispro Injectable (ADMELOG) 5 Unit(s) SubCutaneous before lunch  insulin lispro Injectable (ADMELOG) 3 Unit(s) SubCutaneous before dinner  levothyroxine 75 MICROGram(s) Oral daily      insulin lispro (ADMELOG) corrective regimen sliding scale   SubCutaneous three times a day before meals  insulin lispro (ADMELOG) corrective regimen sliding scale   SubCutaneous <User Schedule>  insulin lispro Injectable (ADMELOG) 5 Unit(s) SubCutaneous before lunch  insulin lispro Injectable (ADMELOG) 3 Unit(s) SubCutaneous before dinner      PHYSICAL EXAM:  VITALS:   T(C): 36.4 (06-11-24 @ 06:00), Max: 36.5 (06-10-24 @ 13:30)  HR: 65 (06-11-24 @ 06:00) (63 - 87)  BP: 154/66 (06-11-24 @ 06:00) (141/59 - 154/66)  RR: 18 (06-11-24 @ 06:00) (16 - 18)  SpO2: 96% (06-11-24 @ 06:00) (96% - 98%)    GENERAL: In no acute distress.   Respiratory: Respirations unlabored  Extremities: Warm and dry, no edema  NEURO: Alert and oriented, appropriate     LABS:  POCT Blood Glucose.: 231 mg/dL (06-11-24 @ 10:01)  POCT Blood Glucose.: 198 mg/dL (06-11-24 @ 08:45)  POCT Blood Glucose.: 253 mg/dL (06-11-24 @ 02:07)  POCT Blood Glucose.: 282 mg/dL (06-10-24 @ 22:13)  POCT Blood Glucose.: 288 mg/dL (06-10-24 @ 17:42)  POCT Blood Glucose.: 332 mg/dL (06-10-24 @ 12:35)  POCT Blood Glucose.: 184 mg/dL (06-10-24 @ 08:38)  POCT Blood Glucose.: 137 mg/dL (06-10-24 @ 02:07)  POCT Blood Glucose.: 90 mg/dL (06-09-24 @ 22:17)  POCT Blood Glucose.: 86 mg/dL (06-09-24 @ 21:45)  POCT Blood Glucose.: 207 mg/dL (06-09-24 @ 17:55)  POCT Blood Glucose.: 316 mg/dL (06-09-24 @ 12:49)  POCT Blood Glucose.: 314 mg/dL (06-09-24 @ 08:59)  POCT Blood Glucose.: 243 mg/dL (06-09-24 @ 02:45)  POCT Blood Glucose.: 140 mg/dL (06-09-24 @ 00:12)  POCT Blood Glucose.: 90 mg/dL (06-08-24 @ 22:38)  POCT Blood Glucose.: 68 mg/dL (06-08-24 @ 22:17)  POCT Blood Glucose.: 53 mg/dL (06-08-24 @ 21:51)  POCT Blood Glucose.: 54 mg/dL (06-08-24 @ 21:49)  POCT Blood Glucose.: 86 mg/dL (06-08-24 @ 17:08)  POCT Blood Glucose.: 99 mg/dL (06-08-24 @ 12:57)  POCT Blood Glucose.: 76 mg/dL (06-08-24 @ 12:50)                          11.0   11.20 )-----------( 205      ( 11 Jun 2024 10:40 )             36.6     06-09    134<L>  |  100  |  26<H>  ----------------------------<  395<H>  5.3   |  18<L>  |  1.63<H>    Ca    8.8      09 Jun 2024 12:05  Phos  4.0     06-09  Mg     2.0     06-09      Urinalysis Basic - ( 09 Jun 2024 12:05 )    Color: x / Appearance: x / SG: x / pH: x  Gluc: 395 mg/dL / Ketone: x  / Bili: x / Urobili: x   Blood: x / Protein: x / Nitrite: x   Leuk Esterase: x / RBC: x / WBC x   Sq Epi: x / Non Sq Epi: x / Bacteria: x      A1C with Estimated Average Glucose Result: A1C with Estimated Average Glucose Result: 7.4 % (03-30-24 @ 08:21)  A1C with Estimated Average Glucose Result: 6.8 % (01-10-24 @ 08:37)   Patient seen today for follow up inpatient Diabetes Mellitus management.    Chief Complaint: Type 1 Diabetes Mellitus     INTERVAL HX:  Patient seen in [facility] 3DSU 343 W1. Patient is alert and oriented, sitting up in bed. BG have been stable and mostly at goal 100-180mg/dL while on a Consistent Carbohydrate Diet. Patient has been tolerating PO diet well, PO intake is variable making BG levels inconsistent. Patient does report eating full meals. Blood glucose levels in the last 24hrs have been 137-322mg/dL.     Review of Systems:  General: As above.  Respiratory: Denies any SOB, CURTIS, or cough.  Gastrointestinal: Denies any n/v/d or abdominal pain.   Endocrine: Denies any polyuria, polydipsia polyphagia, visual changes, or numbness in feet.     Allergies  contrast media (iodine-based) (Fever; Vomiting; Flushing; Hypotension; Rash; Stomach Upset)  Ativan (Rash; Urticaria)  penicillins (Hives (Mod to Severe); Anaphylaxis (Mod to Severe); Short breath (Mod to Severe); Angioedema (Mod to Severe))    Intolerances  None.     MEDICATIONS  (STANDING):  ammonium lactate 12% Lotion 1 Application(s) Topical <User Schedule>  apixaban 5 milliGRAM(s) Oral two times a day  aspirin  chewable 81 milliGRAM(s) Oral daily  atorvastatin 80 milliGRAM(s) Oral at bedtime  calamine/zinc oxide Lotion 1 Application(s) Topical three times a day  dextrose 10% Bolus 125 milliLiter(s) IV Bolus once  dextrose 5%. 1000 milliLiter(s) IV Continuous <Continuous>  dextrose 5%. 1000 milliLiter(s) IV Continuous <Continuous>  dextrose 50% Injectable 25 Gram(s) IV Push once  dextrose 50% Injectable 12.5 Gram(s) IV Push once  dextrose Oral Gel 15 Gram(s) Oral once PRN  droxidopa 400 milliGRAM(s) Oral three times a day  ferrous    sulfate 325 milliGRAM(s) Oral two times a day  fludroCORTISONE 0.1 milliGRAM(s) Oral daily  glucagon  Injectable 1 milliGRAM(s) IntraMuscular once  insulin glargine Injectable (LANTUS) 12 Unit(s) SubCutaneous at bedtime  insulin lispro (ADMELOG) corrective regimen sliding scale   SubCutaneous three times a day before meals  insulin lispro (ADMELOG) corrective regimen sliding scale   SubCutaneous <User Schedule>  insulin lispro Injectable (ADMELOG) 5 Unit(s) SubCutaneous before lunch  insulin lispro Injectable (ADMELOG) 3 Unit(s) SubCutaneous before dinner  levothyroxine 75 MICROGram(s) Oral daily  midodrine. 30 milliGRAM(s) Oral <User Schedule>  midodrine. 20 milliGRAM(s) Oral <User Schedule>  Nephro-molly 1 Tablet(s) Oral daily  pantoprazole    Tablet 40 milliGRAM(s) Oral every 24 hours  psyllium Powder 1 Packet(s) Oral three times a day  pyridostigmine 60 milliGRAM(s) Oral <User Schedule>  triamcinolone 0.1% Ointment 1 Application(s) Topical two times a day      atorvastatin 80 milliGRAM(s) Oral at bedtime  dextrose 50% Injectable 25 Gram(s) IV Push once  dextrose 50% Injectable 12.5 Gram(s) IV Push once  dextrose Oral Gel 15 Gram(s) Oral once PRN  fludroCORTISONE 0.1 milliGRAM(s) Oral daily  glucagon  Injectable 1 milliGRAM(s) IntraMuscular once  insulin glargine Injectable (LANTUS) 12 Unit(s) SubCutaneous at bedtime  insulin lispro (ADMELOG) corrective regimen sliding scale   SubCutaneous three times a day before meals  insulin lispro (ADMELOG) corrective regimen sliding scale   SubCutaneous <User Schedule>  insulin lispro Injectable (ADMELOG) 5 Unit(s) SubCutaneous before lunch  insulin lispro Injectable (ADMELOG) 3 Unit(s) SubCutaneous before dinner  levothyroxine 75 MICROGram(s) Oral daily      insulin lispro (ADMELOG) corrective regimen sliding scale   SubCutaneous three times a day before meals  insulin lispro (ADMELOG) corrective regimen sliding scale   SubCutaneous <User Schedule>  insulin lispro Injectable (ADMELOG) 5 Unit(s) SubCutaneous before lunch  insulin lispro Injectable (ADMELOG) 3 Unit(s) SubCutaneous before dinner      PHYSICAL EXAM:  VITALS:   T(C): 36.4 (06-11-24 @ 06:00), Max: 36.5 (06-10-24 @ 13:30)  HR: 65 (06-11-24 @ 06:00) (63 - 87)  BP: 154/66 (06-11-24 @ 06:00) (141/59 - 154/66)  RR: 18 (06-11-24 @ 06:00) (16 - 18)  SpO2: 96% (06-11-24 @ 06:00) (96% - 98%)    GENERAL: In no acute distress.   Respiratory: Respirations unlabored  Extremities: Warm and dry, BLE edema with ACE wrap  NEURO: Alert and oriented, appropriate     LABS:  POCT Blood Glucose.: 231 mg/dL (06-11-24 @ 10:01)  POCT Blood Glucose.: 198 mg/dL (06-11-24 @ 08:45)  POCT Blood Glucose.: 253 mg/dL (06-11-24 @ 02:07)  POCT Blood Glucose.: 282 mg/dL (06-10-24 @ 22:13)  POCT Blood Glucose.: 288 mg/dL (06-10-24 @ 17:42)  POCT Blood Glucose.: 332 mg/dL (06-10-24 @ 12:35)  POCT Blood Glucose.: 184 mg/dL (06-10-24 @ 08:38)  POCT Blood Glucose.: 137 mg/dL (06-10-24 @ 02:07)  POCT Blood Glucose.: 90 mg/dL (06-09-24 @ 22:17)  POCT Blood Glucose.: 86 mg/dL (06-09-24 @ 21:45)  POCT Blood Glucose.: 207 mg/dL (06-09-24 @ 17:55)  POCT Blood Glucose.: 316 mg/dL (06-09-24 @ 12:49)  POCT Blood Glucose.: 314 mg/dL (06-09-24 @ 08:59)  POCT Blood Glucose.: 243 mg/dL (06-09-24 @ 02:45)  POCT Blood Glucose.: 140 mg/dL (06-09-24 @ 00:12)  POCT Blood Glucose.: 90 mg/dL (06-08-24 @ 22:38)  POCT Blood Glucose.: 68 mg/dL (06-08-24 @ 22:17)  POCT Blood Glucose.: 53 mg/dL (06-08-24 @ 21:51)  POCT Blood Glucose.: 54 mg/dL (06-08-24 @ 21:49)  POCT Blood Glucose.: 86 mg/dL (06-08-24 @ 17:08)  POCT Blood Glucose.: 99 mg/dL (06-08-24 @ 12:57)  POCT Blood Glucose.: 76 mg/dL (06-08-24 @ 12:50)                          11.0   11.20 )-----------( 205      ( 11 Jun 2024 10:40 )             36.6     06-09    134<L>  |  100  |  26<H>  ----------------------------<  395<H>  5.3   |  18<L>  |  1.63<H>    Ca    8.8      09 Jun 2024 12:05  Phos  4.0     06-09  Mg     2.0     06-09      Urinalysis Basic - ( 09 Jun 2024 12:05 )    Color: x / Appearance: x / SG: x / pH: x  Gluc: 395 mg/dL / Ketone: x  / Bili: x / Urobili: x   Blood: x / Protein: x / Nitrite: x   Leuk Esterase: x / RBC: x / WBC x   Sq Epi: x / Non Sq Epi: x / Bacteria: x      A1C with Estimated Average Glucose Result: A1C with Estimated Average Glucose Result: 7.4 % (03-30-24 @ 08:21)  A1C with Estimated Average Glucose Result: 6.8 % (01-10-24 @ 08:37)

## 2024-06-11 NOTE — PROGRESS NOTE ADULT - PROBLEM SELECTOR PLAN 7
EF from 4/2024 (post-TAVR) w/ EF 25%, global LV hypokinesis, moderately dilated LV, grade 2 LV diastolic dysfunction, RA moderately dilated.  Repeat TTE with reduced LV function.  TAVR remains well seated  - cards following, will f/u with cardiology regarding resuming GDMT  - per cards, overnight telemetry 6/1 consistent with afib, recommending to continue monitoring on tele  - Continue atorvastatin 80mg daily  - f/u w/ EP outpatient for CRT.    PLAN  - Midodrine as above |  - Holding home GDMT (torsemide 20mg qd, hold home eplerenone 25mg qd, hold home metoprolol succinate 100mg qd)    #Aortic Stenosis  Presented for syncope secondary to severe AS, s/p TAVR on 4/24 c/b by VT -> shock -> VFib -> shock.  Course complicated w/ symptomatic bradycardia, requiring TVP, now s/p micra PPM. TAVR well seated on recent TTE without regurgitation  - continue aspirin 81mg daily.

## 2024-06-11 NOTE — PROGRESS NOTE ADULT - NSPROGADDITIONALINFOA_GEN_ALL_CORE
Contact via [employer] Teams during business hours  To reach covering provider access AMION via sunrise tools  For Urgent matters/after-hours/weekends/holidays please page endocrine fellow on call   For nonurgent matters please email [email or web address]    Please note that this patient may be followed by different provider tomorrow.  Notify endocrine 24 hours prior to discharge for final recommendations

## 2024-06-11 NOTE — PROGRESS NOTE ADULT - ATTENDING COMMENTS
73F PMH: HFrEF (EF 30%), AS, LBBB, HTN, DM1, CKD, hypothyroidism, chronic lymphedema, LELIA (3L home O2) p/w for syncope 2/2 severe AS, s/p TAVR 4/24. Course complicated by contrast induced allergic reaction (had CTA for TAVR eval) and contrast related renal failure (acute on chronic) requiring HD. TAVR c/b VT and vfib requiring shock and flash pulmonary edema causing Acute hypoxic respiratory failure  requiring intubation.  Course complicated by contrast induced allergic reaction (had CTA for TAVR eval) and contrast related renal failure (acute on chronic) requiring CRRT for fluid removal and pressors for vasoplegia and hypovolemia due to CRRT. Patient extubated, now off pressors, CRRT.     1. Orthostatic hypotension- severe and persistent despite droxidopa, midodrine with florinef. Pyridostigmine started on 6/5. Orthostasis and symptoms improved initially but worsened again, Pyridostigmine increased. Droxidopa increased back to previous dose. Monitor.     2. Urinary retention- failed 3 voiding trials.   3. Left UE DVT- c/w eliquis. Repeat US showing persistent DVT noted in the left subclavian and axillary veins. ACE wrap for swelling.   4. T1DM- uncontrolled with intermittent episodes of labile BS. Being managed by endocrinology. Currently on Lantus and Admelog.    Rest of care per plan above

## 2024-06-11 NOTE — PROGRESS NOTE ADULT - PROBLEM SELECTOR PLAN 1
Check BG TID AC, HS, and 2AM  - Continue Lantus 12 units in am   - Adjust Admelog to 5-4-2 units before meal ( hold if NPO or eats less than 50 % of meals )   - C/w Low dose Admelog correction scale TID AC, low dose Admelog correction scale QHS and 2am in case of rebound hypo/hyperglycemia  - please keep hypoglycemia protocol in place     Discharge:  - Will be determined according to BG/insulin needs/PO intake  at time of discharge; basal/bolus insulin doses TBD  - Of note, patient instructed on discharge from recent hospitalization at [place] to start Farxiga for CHF. Given risks of DKA of SGLT2i in T1DM, would not start until better data is available for its benefits.  - Follow up with Dr. Luis Dumont outpatient  - Patient will likely required rehab  - F/u with optho/renal/cardiac as outpatient. - Check BG TID AC, HS, and 2AM  - Change Lantus 12 units to QHS again; Given NPH 6 units this am for transition   - Adjust Admelog to 5-4-2 units before meal (hold if NPO or eats less than 50 % of meals)   - C/w Low dose Admelog low dose correction scale TID AC, low dose Admelog correction scale QHS and 2am in case of rebound hypo/hyperglycemia  - Please keep hypoglycemia protocol in place     Discharge:  - Will be determined according to BG/insulin needs/PO intake  at time of discharge; basal/bolus insulin doses TBD  - Of note, patient instructed on discharge from recent hospitalization at [place] to start Farxiga for CHF. Given risks of DKA of SGLT2i in T1DM, would not start until better data is available for its benefits.  - Follow up with Dr. Luis Dumont outpatient  - Patient will likely required rehab  - F/u with optho/renal/cardiac as outpatient. - Check BG TID AC, HS, and 2AM  - Change Lantus 12 units to QHS again; Give NPH 6 units this am for transition   - Adjust Admelog to 5-5-3 units before meal (hold if NPO or eats less than 50 % of meals)   - C/w Low dose Admelog low dose correction scale TID AC, low dose Admelog correction scale QHS and 2am in case of rebound hypo/hyperglycemia  - Please keep hypoglycemia protocol in place     Discharge:  - Will be determined according to BG/insulin needs/PO intake  at time of discharge; basal/bolus insulin doses TBD  - Of note, patient instructed on discharge from recent hospitalization at [place] to start Farxiga for CHF. Given risks of DKA of SGLT2i in T1DM, would not start until better data is available for its benefits.  - Follow up with Dr. Luis Dumont outpatient  - Patient will likely required rehab  - F/u with optho/renal/cardiac as outpatient.

## 2024-06-11 NOTE — PROGRESS NOTE ADULT - PROBLEM SELECTOR PLAN 2
***Resolved***  5/29 retaining urine requiring straight cath. On straight cath x2 urine was cloudy/milky in appearance. Hardy catheter replaced. UA with >998 WBC, LE, large blood, >36 epithelial cells concerning for UTI. She reports no other urinary sx.  UCx: E. Coli and VRE E. Faecalis   -s/p macrobid (6/3 - 6/6)  -s/p CTX (5/29 - 5/31)  -hardy placed 5/29.

## 2024-06-11 NOTE — PROGRESS NOTE ADULT - SUBJECTIVE AND OBJECTIVE BOX
****[facility] PGY-1*****    CHIEF COMPLAINT:    Overnight Events:  Interval Events:    OBJECTIVE:  ICU Vital Signs Last 24 Hrs  T(C): 36.4 (10 Frederic 2024 20:13), Max: 36.5 (10 Frederic 2024 13:30)  T(F): 97.5 (10 Frederic 2024 20:13), Max: 97.7 (10 Frederic 2024 13:30)  HR: 63 (10 Frederic 2024 20:13) (63 - 87)  BP: 145/72 (10 Frederic 2024 20:13) (141/59 - 146/79)  BP(mean): --  ABP: --  ABP(mean): --  RR: 18 (10 Frederic 2024 20:13) (16 - 18)  SpO2: 96% (10 Frederic 2024 20:13) (96% - 98%)    O2 Parameters below as of 10 Frederic 2024 20:13  Patient On (Oxygen Delivery Method): room air              06-09 @ 07:01 - 06-10 @ 07:00  --------------------------------------------------------  IN: 420 mL / OUT: 400 mL / NET: 20 mL    06-10 @ 07:01  -  06-11 @ 05:47  --------------------------------------------------------  IN: 350 mL / OUT: 1650 mL / NET: -1300 mL      CAPILLARY BLOOD GLUCOSE      POCT Blood Glucose.: 253 mg/dL (11 Jun 2024 02:07)      PHYSICAL EXAM  General:   Head:    Eyes:   Neck:   Cardiac:   Lungs:   Abdomen:   Extremities:   Neuro  Psych:   Skin:  Etc:      HOSPITAL MEDICATIONS:  MEDICATIONS  (STANDING):  ammonium lactate 12% Lotion 1 Application(s) Topical <User Schedule>  apixaban 5 milliGRAM(s) Oral two times a day  aspirin  chewable 81 milliGRAM(s) Oral daily  atorvastatin 80 milliGRAM(s) Oral at bedtime  calamine/zinc oxide Lotion 1 Application(s) Topical three times a day  dextrose 10% Bolus 125 milliLiter(s) IV Bolus once  dextrose 5%. 1000 milliLiter(s) (100 mL/Hr) IV Continuous <Continuous>  dextrose 5%. 1000 milliLiter(s) (50 mL/Hr) IV Continuous <Continuous>  dextrose 50% Injectable 25 Gram(s) IV Push once  dextrose 50% Injectable 12.5 Gram(s) IV Push once  droxidopa 400 milliGRAM(s) Oral three times a day  ferrous    sulfate 325 milliGRAM(s) Oral two times a day  fludroCORTISONE 0.1 milliGRAM(s) Oral daily  glucagon  Injectable 1 milliGRAM(s) IntraMuscular once  insulin glargine Injectable (LANTUS) 12 Unit(s) SubCutaneous <User Schedule>  insulin lispro (ADMELOG) corrective regimen sliding scale   SubCutaneous three times a day before meals  insulin lispro (ADMELOG) corrective regimen sliding scale   SubCutaneous <User Schedule>  insulin lispro Injectable (ADMELOG) 2 Unit(s) SubCutaneous before dinner  insulin lispro Injectable (ADMELOG) 5 Unit(s) SubCutaneous before breakfast  insulin lispro Injectable (ADMELOG) 5 Unit(s) SubCutaneous before breakfast  insulin lispro Injectable (ADMELOG) 4 Unit(s) SubCutaneous before lunch  levothyroxine 75 MICROGram(s) Oral daily  midodrine. 30 milliGRAM(s) Oral <User Schedule>  midodrine. 20 milliGRAM(s) Oral <User Schedule>  Nephro-molly 1 Tablet(s) Oral daily  pantoprazole    Tablet 40 milliGRAM(s) Oral every 24 hours  psyllium Powder 1 Packet(s) Oral three times a day  pyridostigmine 60 milliGRAM(s) Oral <User Schedule>  triamcinolone 0.1% Ointment 1 Application(s) Topical two times a day    MEDICATIONS  (PRN):  dextrose Oral Gel 15 Gram(s) Oral once PRN Blood Glucose LESS THAN 70 milliGRAM(s)/deciliter      LABS:    Hgb Trend: 10.5<--  06-09    134<L>  |  100  |  26<H>  ----------------------------<  395<H>  5.3   |  18<L>  |  1.63<H>    Ca    8.8      09 Jun 2024 12:05  Phos  4.0     06-09  Mg     2.0     06-09      Creatinine Trend: 1.63<--, 1.61<--, 2.06<--, 1.77<--, 1.95<--, 2.50<--    Urinalysis Basic - ( 09 Jun 2024 12:05 )    Color: x / Appearance: x / SG: x / pH: x  Gluc: 395 mg/dL / Ketone: x  / Bili: x / Urobili: x   Blood: x / Protein: x / Nitrite: x   Leuk Esterase: x / RBC: x / WBC x   Sq Epi: x / Non Sq Epi: x / Bacteria: x            MICROBIOLOGY:       RADIOLOGY:  [ ] Reviewed by me   ****[facility] PGY-1*****    CHIEF COMPLAINT: Orthostatic Hypotension    Overnight Events: NA  Interval Events: Patient did not report any acute events.  She denies f/c, n/v, cp/sob, abd pain/diarrhea/constipation, or issues with urination.     OBJECTIVE:  ICU Vital Signs Last 24 Hrs  T(C): 36.4 (10 Frederic 2024 20:13), Max: 36.5 (10 Frederic 2024 13:30)  T(F): 97.5 (10 Frederic 2024 20:13), Max: 97.7 (10 Frederic 2024 13:30)  HR: 63 (10 Frederic 2024 20:13) (63 - 87)  BP: 145/72 (10 Frederic 2024 20:13) (141/59 - 146/79)  BP(mean): --  ABP: --  ABP(mean): --  RR: 18 (10 Frederic 2024 20:13) (16 - 18)  SpO2: 96% (10 Frederic 2024 20:13) (96% - 98%)    O2 Parameters below as of 10 Frederic 2024 20:13  Patient On (Oxygen Delivery Method): room air    06-09 @ 07:01  -  06-10 @ 07:00  --------------------------------------------------------  IN: 420 mL / OUT: 400 mL / NET: 20 mL    06-10 @ 07:01 - 06-11 @ 05:47  --------------------------------------------------------  IN: 350 mL / OUT: 1650 mL / NET: -1300 mL    CAPILLARY BLOOD GLUCOSE  POCT Blood Glucose.: 253 mg/dL (11 Jun 2024 02:07)    PHYSICAL EXAM  General: Elderly female, asleep, no acute distress  Head: NA  Eyes: NA  Neck: NA  Cardiac: Normal R/R,  no other pathological heart sounds  Lungs: moves air well, clear lung fields  Abdomen: +BS, Non tender non distended  Extremities: +2LE edema  Neuro: AO3  Psych: NA  Skin: NA  Etc:  NA    HOSPITAL MEDICATIONS:  MEDICATIONS  (STANDING):  ammonium lactate 12% Lotion 1 Application(s) Topical <User Schedule>  apixaban 5 milliGRAM(s) Oral two times a day  aspirin  chewable 81 milliGRAM(s) Oral daily  atorvastatin 80 milliGRAM(s) Oral at bedtime  calamine/zinc oxide Lotion 1 Application(s) Topical three times a day  dextrose 10% Bolus 125 milliLiter(s) IV Bolus once  dextrose 5%. 1000 milliLiter(s) (100 mL/Hr) IV Continuous <Continuous>  dextrose 5%. 1000 milliLiter(s) (50 mL/Hr) IV Continuous <Continuous>  dextrose 50% Injectable 25 Gram(s) IV Push once  dextrose 50% Injectable 12.5 Gram(s) IV Push once  droxidopa 400 milliGRAM(s) Oral three times a day  ferrous    sulfate 325 milliGRAM(s) Oral two times a day  fludroCORTISONE 0.1 milliGRAM(s) Oral daily  glucagon  Injectable 1 milliGRAM(s) IntraMuscular once  insulin glargine Injectable (LANTUS) 12 Unit(s) SubCutaneous <User Schedule>  insulin lispro (ADMELOG) corrective regimen sliding scale   SubCutaneous three times a day before meals  insulin lispro (ADMELOG) corrective regimen sliding scale   SubCutaneous <User Schedule>  insulin lispro Injectable (ADMELOG) 2 Unit(s) SubCutaneous before dinner  insulin lispro Injectable (ADMELOG) 5 Unit(s) SubCutaneous before breakfast  insulin lispro Injectable (ADMELOG) 5 Unit(s) SubCutaneous before breakfast  insulin lispro Injectable (ADMELOG) 4 Unit(s) SubCutaneous before lunch  levothyroxine 75 MICROGram(s) Oral daily  midodrine. 30 milliGRAM(s) Oral <User Schedule>  midodrine. 20 milliGRAM(s) Oral <User Schedule>  Nephro-molly 1 Tablet(s) Oral daily  pantoprazole    Tablet 40 milliGRAM(s) Oral every 24 hours  psyllium Powder 1 Packet(s) Oral three times a day  pyridostigmine 60 milliGRAM(s) Oral <User Schedule>  triamcinolone 0.1% Ointment 1 Application(s) Topical two times a day    MEDICATIONS  (PRN):  dextrose Oral Gel 15 Gram(s) Oral once PRN Blood Glucose LESS THAN 70 milliGRAM(s)/deciliter      LABS:    Hgb Trend: 10.5<--  06-09    134<L>  |  100  |  26<H>  ----------------------------<  395<H>  5.3   |  18<L>  |  1.63<H>    Ca    8.8      09 Jun 2024 12:05  Phos  4.0     06-09  Mg     2.0     06-09      Creatinine Trend: 1.63<--, 1.61<--, 2.06<--, 1.77<--, 1.95<--, 2.50<--    Urinalysis Basic - ( 09 Jun 2024 12:05 )    Color: x / Appearance: x / SG: x / pH: x  Gluc: 395 mg/dL / Ketone: x  / Bili: x / Urobili: x   Blood: x / Protein: x / Nitrite: x   Leuk Esterase: x / RBC: x / WBC x   Sq Epi: x / Non Sq Epi: x / Bacteria: x            MICROBIOLOGY:       RADIOLOGY:  [ ] Reviewed by me

## 2024-06-11 NOTE — PROGRESS NOTE ADULT - ASSESSMENT
73F w/h/o of T1DM with unknown control due to CKD and anemia of chronic disease. DM c/b CKD. Also h/o HFrEF (EF 30%), mod AS, known LBBB, HTN,  hypothyroidism, chronic lymphedema, suspected OHS/LELIA on 3L NC home O2 p/w 1 episode of syncope with R arm jerking, likely 2/2 severe symptomatic AS. S/p AVR 4/24 c/b DUNCAN on CKD, fever and hypotension. Also UTI/pul edema/ sepsis and L adrenal nodule finding. Also orthostatic hypotension > now on Florinef. Last 24 hour BGs variable [id number]. Noted hypoglycemia over the weekend, Lantus HS was held and then restarted by team next morning for 9am- patient getting Lantus 12 units at 9am the last 2 days. BG levels still elevated, no overnight hypoglycemia. As per patient, she is eating full meals and knows when she eats <50% of her meal to let the RN know/hold nutritional Admelog. Will adjust insulin doses for BG goal (100-180mg/dl).     Per endocrine attending Dr Burton> Adrenal nodule work up completed and pt will need to f/u once she is more stable as out pt.     Home regimen: Lantus 33 units qhs, Novolog based on sliding scale TIDAC normally 3-5 units  Has Dexcom G7 73F w/h/o of T1DM with unknown control due to CKD and anemia of chronic disease. DM c/b CKD. Also h/o HFrEF (EF 30%), mod AS, known LBBB, HTN,  hypothyroidism, chronic lymphedema, suspected OHS/LELIA on 3L NC home O2 p/w 1 episode of syncope with R arm jerking, likely 2/2 severe symptomatic AS. S/p AVR 4/24 c/b DUNCAN on CKD, fever and hypotension. Also UTI/pul edema/ sepsis and L adrenal nodule finding. Also orthostatic hypotension > now on Florinef. Last 24 hour BGs variable [id number]. Noted hypoglycemia over the weekend, Lantus HS was held and then restarted by team next morning for 9am- patient getting Lantus 12 units at 9am the last 2 days. BG levels still elevated during the day, no overnight hypoglycemia. As per patient, she is eating full meals and knows when she eats <50% of her meal to let the RN know/hold nutritional Admelog. Will adjust insulin doses for BG goal (100-180mg/dl). Plan to transition patient back to nightly Lantus given better BG control, NPH 6 units will be given this morning.     Per endocrine attending Dr Burton> Adrenal nodule work up completed and patient will need to f/u once she is more stable as out pt.     Home regimen: Lantus 33 units qhs, Novolog based on sliding scale TID AC normally 3-5 units  Has Dexcom G7 73F w/h/o of T1DM with unknown control due to CKD and anemia of chronic disease. DM c/b CKD. Also h/o HFrEF (EF 30%), mod AS, known LBBB, HTN,  hypothyroidism, chronic lymphedema, suspected OHS/LELIA on 3L NC home O2 p/w 1 episode of syncope with R arm jerking, likely 2/2 severe symptomatic AS. S/p AVR 4/24 c/b DUNCAN on CKD, fever and hypotension. Also UTI/pul edema/ sepsis and L adrenal nodule finding. Also orthostatic hypotension > now on Florinef. Last 24 hour BGs variable [id number]. Noted hypoglycemia over the weekend, Lantus HS was held and then restarted by team next morning for 9am- patient getting Lantus 12 units at 9am the last 2 days. BG levels still elevated during the day, no overnight hypoglycemia. As per patient, she is eating full meals and knows when she eats <50% of her meal to let the RN know/hold nutritional Admelog. Will adjust insulin doses for BG goal (100-180mg/dl). Plan to transition patient back to nightly Lantus given better BG control, NPH 6 units given this morning.     Per endocrine attending Dr Burton> Adrenal nodule work up completed and patient will need to f/u once she is more stable as out pt.     Home regimen: Lantus 33 units qhs, Novolog based on sliding scale TID AC normally 3-5 units  Has Dexcom G7

## 2024-06-11 NOTE — PROGRESS NOTE ADULT - PROBLEM SELECTOR PLAN 1
Likely in setting of deconditioning.  Improving with fluids.  Will attempt orthostatics daily.  Hoping to wean off midodrine in order to restart GDMT.  Orthostatics improving from supine to seated, but still problematic with standing.    - leg compression, will attempt abdominal binder however difficult due to body habitus     PLAN  - continue droxydopa 400mgTID(decreased from 600mgTID) , midodrine TID (30mg, 30mg, 20mg). fludrocortisone to 0.1mg daily,  pyridostigmine 30mg tid  - Orthostatics daily

## 2024-06-11 NOTE — PROGRESS NOTE ADULT - SUBJECTIVE AND OBJECTIVE BOX
[facility] - Howard Kimble, Cherise, Carlos, Herman Alston  Office Number:  [phone]    Patient resting comfortably in bed in NAD.  Laying flat with no respiratory distress.  No complaints of chest pain, dyspnea, palpitations, PND, or orthopnea.  On RA this AM  Still orthostatic    ROS: negative unless otherwise mentioned.    Telemetry:  off    MEDICATIONS  (STANDING):  ammonium lactate 12% Lotion 1 Application(s) Topical <User Schedule>  apixaban 5 milliGRAM(s) Oral two times a day  aspirin  chewable 81 milliGRAM(s) Oral daily  atorvastatin 80 milliGRAM(s) Oral at bedtime  calamine/zinc oxide Lotion 1 Application(s) Topical three times a day  dextrose 10% Bolus 125 milliLiter(s) IV Bolus once  dextrose 5%. 1000 milliLiter(s) (100 mL/Hr) IV Continuous <Continuous>  dextrose 5%. 1000 milliLiter(s) (50 mL/Hr) IV Continuous <Continuous>  dextrose 50% Injectable 25 Gram(s) IV Push once  dextrose 50% Injectable 12.5 Gram(s) IV Push once  droxidopa 400 milliGRAM(s) Oral three times a day  ferrous    sulfate 325 milliGRAM(s) Oral two times a day  fludroCORTISONE 0.1 milliGRAM(s) Oral daily  glucagon  Injectable 1 milliGRAM(s) IntraMuscular once  insulin glargine Injectable (LANTUS) 12 Unit(s) SubCutaneous at bedtime  insulin lispro (ADMELOG) corrective regimen sliding scale   SubCutaneous three times a day before meals  insulin lispro (ADMELOG) corrective regimen sliding scale   SubCutaneous <User Schedule>  insulin lispro Injectable (ADMELOG) 5 Unit(s) SubCutaneous before lunch  insulin lispro Injectable (ADMELOG) 3 Unit(s) SubCutaneous before dinner  insulin lispro Injectable (ADMELOG) 5 Unit(s) SubCutaneous before breakfast  insulin lispro Injectable (ADMELOG) 5 Unit(s) SubCutaneous before breakfast  insulin NPH human recombinant 6 Unit(s) SubCutaneous once  levothyroxine 75 MICROGram(s) Oral daily  midodrine. 30 milliGRAM(s) Oral <User Schedule>  midodrine. 20 milliGRAM(s) Oral <User Schedule>  Nephro-molly 1 Tablet(s) Oral daily  pantoprazole    Tablet 40 milliGRAM(s) Oral every 24 hours  psyllium Powder 1 Packet(s) Oral three times a day  pyridostigmine 60 milliGRAM(s) Oral <User Schedule>  triamcinolone 0.1% Ointment 1 Application(s) Topical two times a day    MEDICATIONS  (PRN):  dextrose Oral Gel 15 Gram(s) Oral once PRN Blood Glucose LESS THAN 70 milliGRAM(s)/deciliter      Allergies    contrast media (iodine-based) (Fever; Vomiting; Flushing; Hypotension; Rash; Stomach Upset)  Ativan (Rash; Urticaria)  penicillins (Hives (Mod to Severe); Anaphylaxis (Mod to Severe); Short breath (Mod to Severe); Angioedema (Mod to Severe))    Intolerances        Vital Signs Last 24 Hrs  T(C): 36.4 (11 Jun 2024 06:00), Max: 36.5 (10 Frederic 2024 13:30)  T(F): 97.6 (11 Jun 2024 06:00), Max: 97.7 (10 Frederic 2024 13:30)  HR: 65 (11 Jun 2024 06:00) (63 - 87)  BP: 154/66 (11 Jun 2024 06:00) (141/59 - 154/66)  BP(mean): --  RR: 18 (11 Jun 2024 06:00) (16 - 18)  SpO2: 96% (11 Jun 2024 06:00) (96% - 98%)    Parameters below as of 11 Jun 2024 06:00  Patient On (Oxygen Delivery Method): room air        I&O's Summary    10 Frederic 2024 07:01  -  11 Jun 2024 07:00  --------------------------------------------------------  IN: 350 mL / OUT: 2150 mL / NET: -1800 mL    11 Jun 2024 07:01  -  11 Jun 2024 09:49  --------------------------------------------------------  IN: 200 mL / OUT: 400 mL / NET: -200 mL        ON EXAM:    General: NAD, awake and alert, oriented x 3  HEENT: Mucous membranes are moist, anicteric  Lungs: Non-labored, breath sounds are clear bilaterally, No wheezing, rales or rhonchi  Cardiovascular: Regular, S1 and S2, + 3/6 SM at RUSB  Gastrointestinal: Bowel Sounds present, soft, nontender.   Lymph: chronic peripheral edema. No lymphadenopathy.  Psych:  Mood & affect appropriate    LABS: All Labs Reviewed:    09 Jun 2024 12:05    134    |  100    |  26     ----------------------------<  395    5.3     |  18     |  1.63     Ca    8.8        09 Jun 2024 12:05  Phos  4.0       09 Jun 2024 12:05  Mg     2.0       09 Jun 2024 12:05            Blood Culture:

## 2024-06-11 NOTE — PROGRESS NOTE ADULT - PROBLEM SELECTOR PLAN 3
This note was copied from the mother's chart.  Inpatient Lactation Consult    Maternal history:  Past Medical History:   Diagnosis Date    Hyperthyroidism       No past surgical history on file.   OB History    Para Term  AB Living   1 1 1 0 0 1   SAB IAB Ectopic Molar Multiple Live Births   0 0 0 0 0 1      Current Facility-Administered Medications   Medication    miSOPROStol (CYTOTEC) tablet 1,000 mcg    carboprost (HEMABATE) injection 250 mcg    ondansetron (ZOFRAN) injection 4 mg    oxyTOCIN (PITOCIN) injection 10 Units    oxyTOCIN (PITOCIN) 30 Units in sodium chloride 0.9% 500 mL    hydroCORTisone-pramoxine (ANALPRAM-HC) 2.5-1 % rectal cream    simethicone (MYLICON) tablet 125 mg    calcium carbonate (TUMS) chewable tablet 500 mg    acetaminophen (TYLENOL) tablet 650 mg    ibuprofen (MOTRIN) tablet 600 mg    measles-mumps-rubella vaccine 0.5 mL    varicella virus (VARIVAX) live vaccine 0.5 mL    diphtheria-pertussis (acellular)-tetanus (BOOSTRIX) vaccine 0.5 mL         P - Knowledge deficit r/t breastfeeding    I- Paged by Desirae CUNNINGHAM for consult - SGA baby due for feeding. Met with dyad and family. Mother reports   well after delivery but is now sleepy. Assisted with feeding session. Maternal breast exam reveals large, pendulous breasts with nipples that are rob and intact but short and retract.  oral assessment reveals a sporadic suck. Demonstrated cross cradle hold, asymmetric latch and breast compression.  latched but did not suck. After multiple attempts, session stopped and pumping initiated. 12 ml of expressed breast milk collected, divided, labeled and stored at the bedside. Demonstrated syringe feeding with 6 ml of expressed breast milk. Mother reports also has some frozen colostrum at home that she will have family bring in. Discussed plan of skin to skin/breastfeeding/pumping/complement feeding until  is consistent with latching, positioning,  asymmetric latch, deep versus shallow latch, nutritive vs non-nutritive suck, breast compression, gentle stimulation, typical  behavior, skin to skin, stressed 8-12 feeding/pumping sessions in 24 hours with no time limits when actively breastfeeding, breast milk production, supply/demand, frequency/duration, feeding cues, stomach capacity, signs of effective breastfeeding, and monitoring  output. Encouraged to call for assistance as needed. Encouraged to transition to the couch or chair for feeding sessions. Education, handouts, how to donate breast milk, and Lactation Department Helpline provided - encouraged to call with any questions/concerns/need for assistance. Offered outpatient lactation consult after discharge. Reports having a breast pump for home use. Questions answered. Desirae CUNNINGHAM updated.    E- Mother verbalized understanding of plan of care and instructions.    Plan - Will continue to follow as needed.   ***Resolved***  Patient had maroon-colored stool on 5/24, positive for blood.  Hemodynamically stable with stable hemoglobin.    - GI outpatient Dr Hansel Luna consulted, appreciate recs  - c/w Protonix 40mg qd PO per GI  - bowel regimen held  - Continue Eliquis 5mg BID unless there is a drop in Hb (Hgb stable to 9-10)  - Continue diet

## 2024-06-12 NOTE — PROGRESS NOTE ADULT - ATTENDING COMMENTS
73F PMH: HFrEF (EF 30%), AS, LBBB, HTN, DM1, CKD, hypothyroidism, chronic lymphedema, LELIA (3L home O2) p/w for syncope 2/2 severe AS, s/p TAVR 4/24. Course complicated by contrast induced allergic reaction (had CTA for TAVR eval) and contrast related renal failure (acute on chronic) requiring HD. TAVR c/b VT and vfib requiring shock and flash pulmonary edema causing Acute hypoxic respiratory failure  requiring intubation.  Course complicated by contrast induced allergic reaction (had CTA for TAVR eval) and contrast related renal failure (acute on chronic) requiring CRRT for fluid removal and pressors for vasoplegia and hypovolemia due to CRRT. Patient extubated, now off pressors, CRRT.     1. Orthostatic hypotension- severe and persistent despite droxidopa, midodrine with florinef. Pyridostigmine started on 6/5. Improved today.     2. Urinary retention- failed 3 voiding trials.   3. Left UE DVT- c/w eliquis. Repeat US showing persistent DVT noted in the left subclavian and axillary veins. ACE wrap for swelling.   4. T1DM- uncontrolled with intermittent episodes of labile BS. Being managed by endocrinology. Currently on Lantus and Admelog.  5. Frequent loose stools- Metamucil, prn Imodium.    Rehab if remains stable.

## 2024-06-12 NOTE — PROGRESS NOTE ADULT - ASSESSMENT
73F w HFrEF (EF 30%), mod AS, known LBBB, HTN, IDDM1, CKD, hypothyroidism, chronic lymphedema, p/w 1 episode of syncope with R arm jerking.     #Syncope-Aortic Stenosis  - AS in setting of decreased LVEF  - s/p tavr on 4/24 c/b VT -> shock -> VFib -> shock  - hypoxic/congested, and was intubated, extubated on 4/25  - on 4/25 with worsening bradycardia, s/p TVP placement followed by AV Micra placement   - Remains in Sinus Rhythm/known lbbb with intermittent   - on high dose midodrine, droxidopa and florinef in the setting of orthostasis.  - TTE on 5/21 with global LV dysfunction severely decreased. Well seated TAVR, limited echo 6/4 with unchanged mod lv and no sig changes overall  - to consider CRT-D in the future  - Cannot initiate GDMT due to orthostasis  - would try to mobilize as best as possible and monitor orthostatics, hopefully standing bp can be kept >100.   - cont asa and statin  - Hgb stable     #Chronic Systolic HF (EF 30%), mod to severe AS, HTN, LBBB, Lymphedema   - Has known systolic HF and AS.    - Repeat TTE showed EF 30%, mild-mod LVH, mildly reduced RVF, mod-severe AS (.61 cmsq), mod pHTN  - Repeat limited study with normal function TAVR  - On home Torsemide 20 mg daily, Eplerenone 25 mg daily, and Toprol  mg daily, all currently on hold  - On Midodrine 30mg TID for orthostatic hypotension  - Droxidopa now initiated as well for significant orthostasis upto 600mg TID  - Unable to initiate GDMT at this time due to significant orthostatic hypotension  - initially CVVHD, then HD  - HD now on hold, as she is urinating and creatinine remains stable,   - Creatinine improving  - prn iv bumex  - Diagnosed with UE DVT and now on full loading dose of Eliquis  - PT and Bed to chair as much as possible    #Orthostatic hypotension.   - would attempt orthostatics daily.  Hoping to wean off midodrine in order to restart GDMT. Orthostatics improving from supine to seated, but still problematic with standing. Remains orthostatic and symptomatic on standing  - Repeat othostatics-improved-Florinef decreased 2/2 increased edema, midodrine increased to 30mgBID 20mg OD as still symptomatic with standing  - Overall symptoms are markedly improving, though gradually. Still orthostatic as of 6/11  - On Pyridostigmine 60md TID, increased on 6/10  - cont droxidopa  - Repeat orthostatics and OOB   - would have pt sit in chair as much as possible.     # Atrial Fibrillation  - Reported episode over the weekend (6/1-6/2)  - Is on AC for DVT  - Will need outpatient monitoring  - Continue telemetry    #UTI  - Resolved  - Abx as per ID

## 2024-06-12 NOTE — PROGRESS NOTE ADULT - SUBJECTIVE AND OBJECTIVE BOX
INTERVAL HPI/OVERNIGHT EVENTS:    no new events   remains orthostatic yesterday               MEDICATIONS  (STANDING):  ammonium lactate 12% Lotion 1 Application(s) Topical <User Schedule>  apixaban 5 milliGRAM(s) Oral two times a day  aspirin  chewable 81 milliGRAM(s) Oral daily  atorvastatin 80 milliGRAM(s) Oral at bedtime  calamine/zinc oxide Lotion 1 Application(s) Topical three times a day  dextrose 10% Bolus 125 milliLiter(s) IV Bolus once  dextrose 5%. 1000 milliLiter(s) (100 mL/Hr) IV Continuous <Continuous>  dextrose 5%. 1000 milliLiter(s) (50 mL/Hr) IV Continuous <Continuous>  dextrose 50% Injectable 25 Gram(s) IV Push once  dextrose 50% Injectable 12.5 Gram(s) IV Push once  droxidopa 600 milliGRAM(s) Oral three times a day  ferrous    sulfate 325 milliGRAM(s) Oral two times a day  fludroCORTISONE 0.1 milliGRAM(s) Oral daily  glucagon  Injectable 1 milliGRAM(s) IntraMuscular once  insulin glargine Injectable (LANTUS) 12 Unit(s) SubCutaneous once  insulin lispro (ADMELOG) corrective regimen sliding scale   SubCutaneous three times a day before meals  insulin lispro (ADMELOG) corrective regimen sliding scale   SubCutaneous <User Schedule>  insulin lispro Injectable (ADMELOG) 3 Unit(s) SubCutaneous three times a day before meals  levothyroxine 75 MICROGram(s) Oral daily  midodrine. 30 milliGRAM(s) Oral <User Schedule>  midodrine. 20 milliGRAM(s) Oral <User Schedule>  Nephro-molly 1 Tablet(s) Oral daily  pantoprazole    Tablet 40 milliGRAM(s) Oral every 24 hours  psyllium Powder 1 Packet(s) Oral three times a day  pyridostigmine 30 milliGRAM(s) Oral <User Schedule>  triamcinolone 0.1% Ointment 1 Application(s) Topical two times a day    MEDICATIONS  (PRN):  dextrose Oral Gel 15 Gram(s) Oral once PRN Blood Glucose LESS THAN 70 milliGRAM(s)/deciliter      Allergies    contrast media (iodine-based) (Fever; Vomiting; Flushing; Hypotension; Rash; Stomach Upset)  Ativan (Rash; Urticaria)  penicillins (Hives (Mod to Severe); Anaphylaxis (Mod to Severe); Short breath (Mod to Severe); Angioedema (Mod to Severe))    Intolerances            General:  No wt loss, fevers, chills, night sweats, fatigue,   Eyes:  Good vision, no reported pain  ENT:  No sore throat, pain, runny nose, dysphagia  CV:  No pain, palpitations, hypo/hypertension  Resp:  No dyspnea, cough, tachypnea, wheezing  GI:  No pain, No nausea, No vomiting, No diarrhea, No constipation, No weight loss, No fever, No pruritis, No rectal bleeding, No tarry stools, No dysphagia,  :  No pain, bleeding, incontinence, nocturia  Muscle:  No pain, weakness  Neuro:  No weakness, tingling, memory problems  Psych:  No fatigue, insomnia, mood problems, depression  Endocrine:  No polyuria, polydipsia, cold/heat intolerance  Heme:  No petechiae, ecchymosis, easy bruisability  Skin:  No rash, tattoos, scars, edema        PHYSICAL EXAM  Vital Signs Last 24 Hrs  T(C): 36.6 (09 Jun 2024 04:52), Max: 36.7 (08 Jun 2024 20:18)  T(F): 97.8 (09 Jun 2024 04:52), Max: 98 (08 Jun 2024 20:18)  HR: 77 (09 Jun 2024 04:52) (66 - 88)  BP: 128/65 (09 Jun 2024 04:52) (126/72 - 157/69)  BP(mean): --  RR: 18 (09 Jun 2024 04:52) (18 - 18)  SpO2: 94% (09 Jun 2024 04:52) (94% - 98%)    Parameters below as of 09 Jun 2024 04:52  Patient On (Oxygen Delivery Method): room air        06-08-24 @ 07:01  -  06-09-24 @ 07:00  --------------------------------------------------------  IN: 560 mL / OUT: 2400 mL / NET: -1840 mL            GENERAL:  Appears stated age, well-groomed, well-nourished, no distress  HEENT:  NC/AT,  conjunctivae clear and pink, no thyromegaly, nodules, adenopathy, no JVD, sclera -anicteric  CHEST:  Full & symmetric excursion, no increased effort, breath sounds clear  HEART:  Regular rhythm, S1, S2, no murmur/rub/S3/S4, no abdominal bruit, no edema  ABDOMEN:  Soft, non-tender, non-distended, normoactive bowel sounds,  no masses ,no hepato-splenomegaly, no signs of chronic liver disease  EXTEREMITIES:  no cyanosis,clubbing or edema  SKIN:  No rash/erythema/ecchymoses/petechiae/wounds/abscess/warm/dry  NEURO:  Alert, oriented, no asterixis, no tremor, no encephalopathy          LABS:            mL          amylase   lipase  RADIOLOGY & ADDITIONAL TESTS:

## 2024-06-12 NOTE — PROGRESS NOTE ADULT - PROBLEM SELECTOR PLAN 1
Likely in setting of deconditioning.  Improving with fluids.  Will attempt orthostatics daily.  Hoping to wean off midodrine in order to restart GDMT.  Orthostatics improving from supine to seated, but still problematic with standing.    - leg compression, will attempt abdominal binder however difficult due to body habitus     PLAN  - Increased droxydopa back to 600 TID  , midodrine TID (30mg, 30mg, 20mg). fludrocortisone to 0.1mg daily,  pyridostigmine 30mg tid  - Orthostatics daily

## 2024-06-12 NOTE — PROGRESS NOTE ADULT - ASSESSMENT
gi bleed  afib  heart failure  dvt    hgb is stable  cont reg diet  cbc stable  no objection to cont a/c

## 2024-06-12 NOTE — PROGRESS NOTE ADULT - SUBJECTIVE AND OBJECTIVE BOX
[facility] -- Shyanne Mueller, Goran, Howard, Carlos Luong Savella  Office # [phone]      Follow Up:  HF     Subjective/Observations: Patient seen and examined. Events noted. Resting comfortably in bed. No complaints of chest pain, dyspnea, or palpitations reported. No signs of orthopnea or PND.       REVIEW OF SYSTEMS: All other review of systems is negative unless indicated above    PAST MEDICAL & SURGICAL HISTORY:  Hypertension      Diabetes      Lymphedema      H/O left bundle branch block      History of left bundle branch block (LBBB)      Systolic heart failure, chronic      Type 1 diabetes      H/O cataract  2020      History of surgical removal of meniscus of knee  left in 1971      Frozen shoulder  2000      H/O Achilles tendon repair  lengthened bilaterally, 2000      Fractured skull  1968          MEDICATIONS  (STANDING):  ammonium lactate 12% Lotion 1 Application(s) Topical <User Schedule>  apixaban 5 milliGRAM(s) Oral two times a day  aspirin  chewable 81 milliGRAM(s) Oral daily  atorvastatin 80 milliGRAM(s) Oral at bedtime  calamine/zinc oxide Lotion 1 Application(s) Topical three times a day  dextrose 10% Bolus 125 milliLiter(s) IV Bolus once  dextrose 5%. 1000 milliLiter(s) (50 mL/Hr) IV Continuous <Continuous>  dextrose 5%. 1000 milliLiter(s) (100 mL/Hr) IV Continuous <Continuous>  dextrose 50% Injectable 25 Gram(s) IV Push once  dextrose 50% Injectable 12.5 Gram(s) IV Push once  droxidopa 600 milliGRAM(s) Oral three times a day  ferrous    sulfate 325 milliGRAM(s) Oral two times a day  fludroCORTISONE 0.1 milliGRAM(s) Oral daily  glucagon  Injectable 1 milliGRAM(s) IntraMuscular once  insulin glargine Injectable (LANTUS) 12 Unit(s) SubCutaneous at bedtime  insulin lispro (ADMELOG) corrective regimen sliding scale   SubCutaneous <User Schedule>  insulin lispro (ADMELOG) corrective regimen sliding scale   SubCutaneous three times a day before meals  insulin lispro Injectable (ADMELOG) 5 Unit(s) SubCutaneous before breakfast  insulin lispro Injectable (ADMELOG) 3 Unit(s) SubCutaneous before dinner  insulin lispro Injectable (ADMELOG) 5 Unit(s) SubCutaneous before lunch  levothyroxine 75 MICROGram(s) Oral daily  midodrine. 30 milliGRAM(s) Oral <User Schedule>  midodrine. 20 milliGRAM(s) Oral <User Schedule>  Nephro-molly 1 Tablet(s) Oral daily  pantoprazole    Tablet 40 milliGRAM(s) Oral every 24 hours  psyllium Powder 1 Packet(s) Oral three times a day  pyridostigmine 60 milliGRAM(s) Oral <User Schedule>  triamcinolone 0.1% Ointment 1 Application(s) Topical two times a day    MEDICATIONS  (PRN):  dextrose Oral Gel 15 Gram(s) Oral once PRN Blood Glucose LESS THAN 70 milliGRAM(s)/deciliter      Allergies    contrast media (iodine-based) (Fever; Vomiting; Flushing; Hypotension; Rash; Stomach Upset)  Ativan (Rash; Urticaria)  penicillins (Hives (Mod to Severe); Anaphylaxis (Mod to Severe); Short breath (Mod to Severe); Angioedema (Mod to Severe))    Intolerances            Vital Signs Last 24 Hrs  T(C): 36.4 (12 Jun 2024 05:12), Max: 36.8 (11 Jun 2024 20:28)  T(F): 97.6 (12 Jun 2024 05:12), Max: 98.2 (11 Jun 2024 20:28)  HR: 67 (12 Jun 2024 05:12) (66 - 81)  BP: 128/63 (12 Jun 2024 05:12) (128/63 - 154/67)  BP(mean): --  RR: 18 (12 Jun 2024 05:12) (18 - 18)  SpO2: 95% (12 Jun 2024 05:12) (95% - 100%)    Parameters below as of 12 Jun 2024 05:12  Patient On (Oxygen Delivery Method): room air        I&O's Summary    11 Jun 2024 07:01  -  12 Jun 2024 07:00  --------------------------------------------------------  IN: 200 mL / OUT: 1500 mL / NET: -1300 mL          PHYSICAL EXAM:  TELE: off  Constitutional: NAD, awake   HEENT: Moist Mucous Membranes, Anicteric  Pulmonary: Decreased breath sounds b/l. No rales, crackles or wheeze appreciated.   Cardiovascular: Regular, S1 and S2, + SM   Gastrointestinal: Bowel Sounds present, soft, nontender.   Lymph: + peripheral edema. No lymphadenopathy.  Skin: No visible rashes or ulcers.  Psych:  Mood & affect appropriate for situation    LABS: All Labs Reviewed:                        11.0   11.20 )-----------( 205      ( 11 Jun 2024 10:40 )             36.6     11 Jun 2024 10:40    134    |  102    |  22     ----------------------------<  284    5.1     |  16     |  1.50   09 Jun 2024 12:05    134    |  100    |  26     ----------------------------<  395    5.3     |  18     |  1.63     Ca    8.8        11 Jun 2024 10:40  Ca    8.8        09 Jun 2024 12:05  Phos  4.0       11 Jun 2024 10:40  Phos  4.0       09 Jun 2024 12:05  Mg     2.0       11 Jun 2024 10:40  Mg     2.0       09 Jun 2024 12:05          - Troponin:

## 2024-06-12 NOTE — PROGRESS NOTE ADULT - ASSESSMENT
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 Course complicated by contrast induced allergic reaction (had CTA for TAVR eval) and contrast related renal failure (acute on chronic) requiring CRRT for fluid removal and pressors for vasoplegia and hypovolemia due to CRRT. Patient now extubated, off pressors, CRRT and downgraded to medicine floors now c/b severe orthostatic hypotension requiring midodrine, droxidopa, and fludrocortisone, as well as melena which has resolved.  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 Course complicated by contrast induced allergic reaction (had CTA for TAVR eval) and contrast related renal failure (acute on chronic) requiring CRRT for fluid removal and pressors for vasoplegia and hypovolemia due to CRRT. Patient now extubated, off pressors, CRRT and downgraded to medicine floors now c/b severe orthostatic hypotension requiring midodrine, droxidopa, and fludrocortisone, as well as melena which has resolved, now pending HONEY but c/b persistent orthostatic hypotension.

## 2024-06-12 NOTE — PROGRESS NOTE ADULT - SUBJECTIVE AND OBJECTIVE BOX
****[facility] Internal [facility] PGY-1*****    CHIEF COMPLAINT:    Overnight Events:  Interval Events:    OBJECTIVE:  ICU Vital Signs Last 24 Hrs  T(C): 36.8 (11 Jun 2024 20:28), Max: 36.8 (11 Jun 2024 20:28)  T(F): 98.2 (11 Jun 2024 20:28), Max: 98.2 (11 Jun 2024 20:28)  HR: 67 (12 Jun 2024 05:12) (66 - 81)  BP: 128/63 (12 Jun 2024 05:12) (128/63 - 154/67)  BP(mean): --  ABP: --  ABP(mean): --  RR: 18 (12 Jun 2024 05:12) (18 - 18)  SpO2: 95% (12 Jun 2024 05:12) (95% - 100%)    O2 Parameters below as of 12 Jun 2024 05:12  Patient On (Oxygen Delivery Method): room air              06-10 @ 07:01 - 06-11 @ 07:00  --------------------------------------------------------  IN: 350 mL / OUT: 2150 mL / NET: -1800 mL    06-11 @ 07:01  -  06-12 @ 06:01  --------------------------------------------------------  IN: 200 mL / OUT: 1500 mL / NET: -1300 mL      CAPILLARY BLOOD GLUCOSE      POCT Blood Glucose.: 158 mg/dL (11 Jun 2024 21:10)      PHYSICAL EXAM  General:   Head:    Eyes:   Neck:   Cardiac:   Lungs:   Abdomen:   Extremities:   Neuro  Psych:   Skin:  Etc:      HOSPITAL MEDICATIONS:  MEDICATIONS  (STANDING):  ammonium lactate 12% Lotion 1 Application(s) Topical <User Schedule>  apixaban 5 milliGRAM(s) Oral two times a day  aspirin  chewable 81 milliGRAM(s) Oral daily  atorvastatin 80 milliGRAM(s) Oral at bedtime  calamine/zinc oxide Lotion 1 Application(s) Topical three times a day  dextrose 10% Bolus 125 milliLiter(s) IV Bolus once  dextrose 5%. 1000 milliLiter(s) (50 mL/Hr) IV Continuous <Continuous>  dextrose 5%. 1000 milliLiter(s) (100 mL/Hr) IV Continuous <Continuous>  dextrose 50% Injectable 25 Gram(s) IV Push once  dextrose 50% Injectable 12.5 Gram(s) IV Push once  droxidopa 600 milliGRAM(s) Oral three times a day  ferrous    sulfate 325 milliGRAM(s) Oral two times a day  fludroCORTISONE 0.1 milliGRAM(s) Oral daily  glucagon  Injectable 1 milliGRAM(s) IntraMuscular once  insulin glargine Injectable (LANTUS) 12 Unit(s) SubCutaneous at bedtime  insulin lispro (ADMELOG) corrective regimen sliding scale   SubCutaneous <User Schedule>  insulin lispro (ADMELOG) corrective regimen sliding scale   SubCutaneous three times a day before meals  insulin lispro Injectable (ADMELOG) 5 Unit(s) SubCutaneous before breakfast  insulin lispro Injectable (ADMELOG) 5 Unit(s) SubCutaneous before lunch  insulin lispro Injectable (ADMELOG) 3 Unit(s) SubCutaneous before dinner  levothyroxine 75 MICROGram(s) Oral daily  midodrine. 20 milliGRAM(s) Oral <User Schedule>  midodrine. 30 milliGRAM(s) Oral <User Schedule>  Nephro-molly 1 Tablet(s) Oral daily  pantoprazole    Tablet 40 milliGRAM(s) Oral every 24 hours  psyllium Powder 1 Packet(s) Oral three times a day  pyridostigmine 60 milliGRAM(s) Oral <User Schedule>  triamcinolone 0.1% Ointment 1 Application(s) Topical two times a day    MEDICATIONS  (PRN):  dextrose Oral Gel 15 Gram(s) Oral once PRN Blood Glucose LESS THAN 70 milliGRAM(s)/deciliter      LABS:                        11.0   11.20 )-----------( 205      ( 11 Jun 2024 10:40 )             36.6     Hgb Trend: 11.0<--, 10.5<--  06-11    134<L>  |  102  |  22  ----------------------------<  284<H>  5.1   |  16<L>  |  1.50<H>    Ca    8.8      11 Jun 2024 10:40  Phos  4.0     06-11  Mg     2.0     06-11      Creatinine Trend: 1.50<--, 1.63<--, 1.61<--, 2.06<--, 1.77<--, 1.95<--    Urinalysis Basic - ( 11 Jun 2024 10:40 )    Color: x / Appearance: x / SG: x / pH: x  Gluc: 284 mg/dL / Ketone: x  / Bili: x / Urobili: x   Blood: x / Protein: x / Nitrite: x   Leuk Esterase: x / RBC: x / WBC x   Sq Epi: x / Non Sq Epi: x / Bacteria: x            MICROBIOLOGY:       RADIOLOGY:  [ ] Reviewed by me   ****[facility] Internal Medicine PGY-1*****    CHIEF COMPLAINT: Orthostatic hypotension    Overnight Events: NA  Interval Events:  They denied f/v, n/v, cp/sob, abd pain/constipation/diarrhea, or issues with urinary frequency/dysuria/hematuria.     OBJECTIVE:  ICU Vital Signs Last 24 Hrs  T(C): 36.8 (11 Jun 2024 20:28), Max: 36.8 (11 Jun 2024 20:28)  T(F): 98.2 (11 Jun 2024 20:28), Max: 98.2 (11 Jun 2024 20:28)  HR: 67 (12 Jun 2024 05:12) (66 - 81)  BP: 128/63 (12 Jun 2024 05:12) (128/63 - 154/67)  BP(mean): --  ABP: --  ABP(mean): --  RR: 18 (12 Jun 2024 05:12) (18 - 18)  SpO2: 95% (12 Jun 2024 05:12) (95% - 100%)    O2 Parameters below as of 12 Jun 2024 05:12  Patient On (Oxygen Delivery Method): room air              06-10 @ 07:01 - 06-11 @ 07:00  --------------------------------------------------------  IN: 350 mL / OUT: 2150 mL / NET: -1800 mL    06-11 @ 07:01 - 06-12 @ 06:01  --------------------------------------------------------  IN: 200 mL / OUT: 1500 mL / NET: -1300 mL      CAPILLARY BLOOD GLUCOSE      POCT Blood Glucose.: 158 mg/dL (11 Jun 2024 21:10)      PHYSICAL EXAM  General:   Head:    Eyes:   Neck:   Cardiac:   Lungs:   Abdomen:   Extremities:   Neuro  Psych:   Skin:  Etc:      HOSPITAL MEDICATIONS:  MEDICATIONS  (STANDING):  ammonium lactate 12% Lotion 1 Application(s) Topical <User Schedule>  apixaban 5 milliGRAM(s) Oral two times a day  aspirin  chewable 81 milliGRAM(s) Oral daily  atorvastatin 80 milliGRAM(s) Oral at bedtime  calamine/zinc oxide Lotion 1 Application(s) Topical three times a day  dextrose 10% Bolus 125 milliLiter(s) IV Bolus once  dextrose 5%. 1000 milliLiter(s) (50 mL/Hr) IV Continuous <Continuous>  dextrose 5%. 1000 milliLiter(s) (100 mL/Hr) IV Continuous <Continuous>  dextrose 50% Injectable 25 Gram(s) IV Push once  dextrose 50% Injectable 12.5 Gram(s) IV Push once  droxidopa 600 milliGRAM(s) Oral three times a day  ferrous    sulfate 325 milliGRAM(s) Oral two times a day  fludroCORTISONE 0.1 milliGRAM(s) Oral daily  glucagon  Injectable 1 milliGRAM(s) IntraMuscular once  insulin glargine Injectable (LANTUS) 12 Unit(s) SubCutaneous at bedtime  insulin lispro (ADMELOG) corrective regimen sliding scale   SubCutaneous <User Schedule>  insulin lispro (ADMELOG) corrective regimen sliding scale   SubCutaneous three times a day before meals  insulin lispro Injectable (ADMELOG) 5 Unit(s) SubCutaneous before breakfast  insulin lispro Injectable (ADMELOG) 5 Unit(s) SubCutaneous before lunch  insulin lispro Injectable (ADMELOG) 3 Unit(s) SubCutaneous before dinner  levothyroxine 75 MICROGram(s) Oral daily  midodrine. 20 milliGRAM(s) Oral <User Schedule>  midodrine. 30 milliGRAM(s) Oral <User Schedule>  Nephro-molly 1 Tablet(s) Oral daily  pantoprazole    Tablet 40 milliGRAM(s) Oral every 24 hours  psyllium Powder 1 Packet(s) Oral three times a day  pyridostigmine 60 milliGRAM(s) Oral <User Schedule>  triamcinolone 0.1% Ointment 1 Application(s) Topical two times a day    MEDICATIONS  (PRN):  dextrose Oral Gel 15 Gram(s) Oral once PRN Blood Glucose LESS THAN 70 milliGRAM(s)/deciliter      LABS:                        11.0   11.20 )-----------( 205      ( 11 Jun 2024 10:40 )             36.6     Hgb Trend: 11.0<--, 10.5<--  06-11    134<L>  |  102  |  22  ----------------------------<  284<H>  5.1   |  16<L>  |  1.50<H>    Ca    8.8      11 Jun 2024 10:40  Phos  4.0     06-11  Mg     2.0     06-11      Creatinine Trend: 1.50<--, 1.63<--, 1.61<--, 2.06<--, 1.77<--, 1.95<--    Urinalysis Basic - ( 11 Jun 2024 10:40 )    Color: x / Appearance: x / SG: x / pH: x  Gluc: 284 mg/dL / Ketone: x  / Bili: x / Urobili: x   Blood: x / Protein: x / Nitrite: x   Leuk Esterase: x / RBC: x / WBC x   Sq Epi: x / Non Sq Epi: x / Bacteria: x            MICROBIOLOGY:       RADIOLOGY:  [ ] Reviewed by me   ****Jose [facility] Internal Medicine PGY-1*****    CHIEF COMPLAINT: Orthostatic hypotension    Overnight Events: NA  Interval Events: Patient reported no acute concerns.   They denied f/v, n/v, cp/sob, abd pain/constipation/diarrhea, or issues with urinary frequency/dysuria/hematuria.     OBJECTIVE:  ICU Vital Signs Last 24 Hrs  T(C): 36.8 (11 Jun 2024 20:28), Max: 36.8 (11 Jun 2024 20:28)  T(F): 98.2 (11 Jun 2024 20:28), Max: 98.2 (11 Jun 2024 20:28)  HR: 67 (12 Jun 2024 05:12) (66 - 81)  BP: 128/63 (12 Jun 2024 05:12) (128/63 - 154/67)  BP(mean): --  ABP: --  ABP(mean): --  RR: 18 (12 Jun 2024 05:12) (18 - 18)  SpO2: 95% (12 Jun 2024 05:12) (95% - 100%)    O2 Parameters below as of 12 Jun 2024 05:12  Patient On (Oxygen Delivery Method): room air              06-10 @ 07:01 - 06-11 @ 07:00  --------------------------------------------------------  IN: 350 mL / OUT: 2150 mL / NET: -1800 mL    06-11 @ 07:01 - 06-12 @ 06:01  --------------------------------------------------------  IN: 200 mL / OUT: 1500 mL / NET: -1300 mL      CAPILLARY BLOOD GLUCOSE      POCT Blood Glucose.: 158 mg/dL (11 Jun 2024 21:10)      PHYSICAL EXAM  General:   Head:    Eyes:   Neck:   Cardiac:   Lungs:   Abdomen:   Extremities:   Neuro  Psych:   Skin:  Etc:      HOSPITAL MEDICATIONS:  MEDICATIONS  (STANDING):  ammonium lactate 12% Lotion 1 Application(s) Topical <User Schedule>  apixaban 5 milliGRAM(s) Oral two times a day  aspirin  chewable 81 milliGRAM(s) Oral daily  atorvastatin 80 milliGRAM(s) Oral at bedtime  calamine/zinc oxide Lotion 1 Application(s) Topical three times a day  dextrose 10% Bolus 125 milliLiter(s) IV Bolus once  dextrose 5%. 1000 milliLiter(s) (50 mL/Hr) IV Continuous <Continuous>  dextrose 5%. 1000 milliLiter(s) (100 mL/Hr) IV Continuous <Continuous>  dextrose 50% Injectable 25 Gram(s) IV Push once  dextrose 50% Injectable 12.5 Gram(s) IV Push once  droxidopa 600 milliGRAM(s) Oral three times a day  ferrous    sulfate 325 milliGRAM(s) Oral two times a day  fludroCORTISONE 0.1 milliGRAM(s) Oral daily  glucagon  Injectable 1 milliGRAM(s) IntraMuscular once  insulin glargine Injectable (LANTUS) 12 Unit(s) SubCutaneous at bedtime  insulin lispro (ADMELOG) corrective regimen sliding scale   SubCutaneous <User Schedule>  insulin lispro (ADMELOG) corrective regimen sliding scale   SubCutaneous three times a day before meals  insulin lispro Injectable (ADMELOG) 5 Unit(s) SubCutaneous before breakfast  insulin lispro Injectable (ADMELOG) 5 Unit(s) SubCutaneous before lunch  insulin lispro Injectable (ADMELOG) 3 Unit(s) SubCutaneous before dinner  levothyroxine 75 MICROGram(s) Oral daily  midodrine. 20 milliGRAM(s) Oral <User Schedule>  midodrine. 30 milliGRAM(s) Oral <User Schedule>  Nephro-molly 1 Tablet(s) Oral daily  pantoprazole    Tablet 40 milliGRAM(s) Oral every 24 hours  psyllium Powder 1 Packet(s) Oral three times a day  pyridostigmine 60 milliGRAM(s) Oral <User Schedule>  triamcinolone 0.1% Ointment 1 Application(s) Topical two times a day    MEDICATIONS  (PRN):  dextrose Oral Gel 15 Gram(s) Oral once PRN Blood Glucose LESS THAN 70 milliGRAM(s)/deciliter      LABS:                        11.0   11.20 )-----------( 205 ( 11 Jun 2024 10:40 )             36.6     Hgb Trend: 11.0<--, 10.5<--  06-11    134<L>  |  102  |  22  ----------------------------<  284<H>  5.1   |  16<L>  |  1.50<H>    Ca    8.8      11 Jun 2024 10:40  Phos  4.0     06-11  Mg     2.0     06-11      Creatinine Trend: 1.50<--, 1.63<--, 1.61<--, 2.06<--, 1.77<--, 1.95<--    Urinalysis Basic - ( 11 Jun 2024 10:40 )    Color: x / Appearance: x / SG: x / pH: x  Gluc: 284 mg/dL / Ketone: x  / Bili: x / Urobili: x   Blood: x / Protein: x / Nitrite: x   Leuk Esterase: x / RBC: x / WBC x   Sq Epi: x / Non Sq Epi: x / Bacteria: x            MICROBIOLOGY:       RADIOLOGY:  [ ] Reviewed by me

## 2024-06-12 NOTE — PROGRESS NOTE ADULT - PROBLEM SELECTOR PLAN 5
Home regimen: lantus 33u qhs w/ premeal sliding scale. A1c 7.4% in 3/2024. multiple hypoglycemic episodes, DKA now resolved, anion gap closed.  Close discussions with endocrine ongoing regarding management.  Hypoglycemic in AM several times now since discharge from MICU.  Appears to have more controlled blood sugars  6/10- B-300   -overnight  lantus held -> will continue basal insulin in AM  - premeal and bedtime fingersticks  - hypoglycemia protocol.    PLAN  - LA: 12units AM, SA: 5/4/3, SSI

## 2024-06-12 NOTE — PROGRESS NOTE ADULT - PROBLEM SELECTOR PLAN 3
***Resolved***  Patient had maroon-colored stool on 5/24, positive for blood.  Hemodynamically stable with stable hemoglobin.    - GI outpatient Dr Hansel Luna consulted, appreciate recs  - c/w Protonix 40mg qd PO per GI  - bowel regimen held  - Continue Eliquis 5mg BID unless there is a drop in Hb (Hgb stable to 9-10)  - Continue diet

## 2024-06-13 NOTE — PROGRESS NOTE ADULT - SUBJECTIVE AND OBJECTIVE BOX
[facility]   [phone]  RENAL FOLLOW UP NOTE  --------------------------------------------------------------------------------  Chief Complaint:    24 hour events/subjective:    seen earlier   still with dizziness when stands up   states otherwise feels well     PAST HISTORY  --------------------------------------------------------------------------------  No significant changes to PMH, PSH, FHx, SHx, unless otherwise noted    ALLERGIES & MEDICATIONS  --------------------------------------------------------------------------------  Allergies    contrast media (iodine-based) (Fever; Vomiting; Flushing; Hypotension; Rash; Stomach Upset)  Ativan (Rash; Urticaria)  penicillins (Hives (Mod to Severe); Anaphylaxis (Mod to Severe); Short breath (Mod to Severe); Angioedema (Mod to Severe))    Intolerances      Standing Inpatient Medications  ammonium lactate 12% Lotion 1 Application(s) Topical <User Schedule>  apixaban 5 milliGRAM(s) Oral two times a day  aspirin  chewable 81 milliGRAM(s) Oral daily  atorvastatin 80 milliGRAM(s) Oral at bedtime  calamine/zinc oxide Lotion 1 Application(s) Topical three times a day  dextrose 10% Bolus 125 milliLiter(s) IV Bolus once  dextrose 5%. 1000 milliLiter(s) IV Continuous <Continuous>  dextrose 5%. 1000 milliLiter(s) IV Continuous <Continuous>  dextrose 50% Injectable 25 Gram(s) IV Push once  dextrose 50% Injectable 12.5 Gram(s) IV Push once  droxidopa 600 milliGRAM(s) Oral three times a day  ferrous    sulfate 325 milliGRAM(s) Oral two times a day  fludroCORTISONE 0.1 milliGRAM(s) Oral daily  glucagon  Injectable 1 milliGRAM(s) IntraMuscular once  insulin glargine Injectable (LANTUS) 10 Unit(s) SubCutaneous at bedtime  insulin lispro (ADMELOG) corrective regimen sliding scale   SubCutaneous three times a day before meals  insulin lispro (ADMELOG) corrective regimen sliding scale   SubCutaneous <User Schedule>  insulin lispro Injectable (ADMELOG) 5 Unit(s) SubCutaneous before breakfast  insulin lispro Injectable (ADMELOG) 5 Unit(s) SubCutaneous before lunch  insulin lispro Injectable (ADMELOG) 3 Unit(s) SubCutaneous before dinner  levothyroxine 75 MICROGram(s) Oral daily  midodrine. 30 milliGRAM(s) Oral <User Schedule>  midodrine. 20 milliGRAM(s) Oral <User Schedule>  Nephro-molly 1 Tablet(s) Oral daily  pantoprazole    Tablet 40 milliGRAM(s) Oral every 24 hours  psyllium Powder 1 Packet(s) Oral three times a day  pyridostigmine 60 milliGRAM(s) Oral <User Schedule>  triamcinolone 0.1% Ointment 1 Application(s) Topical two times a day    PRN Inpatient Medications  dextrose Oral Gel 15 Gram(s) Oral once PRN  loperamide 2 milliGRAM(s) Oral three times a day PRN      REVIEW OF SYSTEMS  --------------------------------------------------------------------------------    Gen: denies  fevers/chills,  CVS: denies chest pain/palpitations  Resp: denies SOB/Cough  GI: Denies N/V/Abd pain  : Denies dysuria    VITALS/PHYSICAL EXAM  --------------------------------------------------------------------------------  T(C): 36.4 (06-13-24 @ 11:59), Max: 36.7 (06-12-24 @ 20:54)  HR: 80 (06-13-24 @ 15:15) (65 - 80)  BP: 145/69 (06-13-24 @ 15:15) (144/65 - 163/74)  RR: 18 (06-13-24 @ 11:59) (18 - 20)  SpO2: 100% (06-13-24 @ 11:59) (96% - 100%)  Wt(kg): --        06-12-24 @ 07:01  -  06-13-24 @ 07:00  --------------------------------------------------------  IN: 920 mL / OUT: 900 mL / NET: 20 mL    06-13-24 @ 07:01  -  06-13-24 @ 19:58  --------------------------------------------------------  IN: 620 mL / OUT: 750 mL / NET: -130 mL      Physical Exam:  	         Gen: comfortable appearing   	Pulm: decrease bs  no rales or ronchi or wheezing  	CV: No JVD. RRR, S1S2; no rub II/VI M   	Abd: +BS, soft, nontender/nondistended, obese   	UE: Warm, no cyanosis  no clubbing, no edema  	LE: Warm, no cyanosis  no clubbing, 2- edema, B/L ACE bandage  	Neuro: alert and oriented       LABS/STUDIES  --------------------------------------------------------------------------------              10.9   7.80  >-----------<  176      [06-13-24 @ 09:35]              36.1     139  |  107  |  20  ----------------------------<  99      [06-13-24 @ 09:35]  4.6   |  20  |  1.66        Ca     8.7     [06-13-24 @ 09:35]      Mg     2.0     [06-13-24 @ 09:35]      Phos  3.8     [06-13-24 @ 09:35]            Creatinine Trend:  SCr 1.66 [06-13 @ 09:35]  SCr 1.50 [06-11 @ 10:40]  SCr 1.63 [06-09 @ 12:05]  SCr 1.61 [06-07 @ 07:19]  SCr 2.06 [06-03 @ 06:40]          PTH -- (Ca 8.4)      [04-19-24 @ 17:54]   109  TSH 10.20      [05-28-24 @ 06:42]  Lipid: chol 74, TG 70, HDL 33, LDL --      [04-13-24 @ 07:05]

## 2024-06-13 NOTE — PROGRESS NOTE ADULT - SUBJECTIVE AND OBJECTIVE BOX
MR#[id number]  PATIENT NAME:JOHNATHAN LOPEZ    DATE OF SERVICE: 06-13-24 @ 06:52  Patient was seen and examined by Cuco Tubbs MD on    06-13-24 @ 06:52 .  Interim events noted.Consultant notes ,Labs,Telemetry reviewed by me       Covering for [facility] -Howard Sterling, Carlos Luong, Herman Alston  Office Number: [phone]         HOSPITAL COURSE: HPI:  73F w HFrEF (EF 30%), mod AS, known LBBB, HTN, IDDM1, CKD, hypothyroidism, chronic lymphedema, suspected OHS/LELIA on 3L NC home O2 p/w 1 episode of syncope. Recent admission at [facility] (3/29-4/5) for ADHF and DUNCAN s/p diuresis, discharged with new home O2 3L NC suspecting OHS/LELIA pending outpatient w/u. Since discharge a week ago, she has been staying at home with   Pt had sob walking to car. Once in car, she was still breathing heavily. Partner noticed pt was not responding to verbal stimuli for 10-15sec and witnessed R arm jerking. Pt gained consciousness quickly without postictal symptoms. Pt went to Cardiologist Dr. Nathan who recommended pt to come to ED. No fever, cp, abd pain, n/v. Leg swelling is improved from prior. Pt on torsemide 40mg which she has been taking. Pt was discharged on 3LNC which she is currently on. Patient reports she did not take Farxiga that she was discharged on as she was concerned about side effects.     In ED, patient was found afebrile, hemodynamically stable, breathing well on 3L NC. Initial labs notable for mild hyponatremia, DUNCAN on CKD, elevated proBNP and elevated pCO2 both improved from prior. (12 Apr 2024 16:31)      INTERIM EVENTS:Patient seen at bedside ,interim events noted.  s/p TAVR 4/24 c/b by VT and Vfib requiring shock and flash pulmonary edema requiring intubation.   Course complicated by contrast induced allergic reaction (had CTA for TAVR eval) and contrast related renal failure (acute on chronic) requiring CRRT for fluid removal and pressors for vasoplegia and hypovolemia due to CRRT.   Patient now extubated, off pressors, CRRT and downgraded to medicine floors.    Patient had maroon-colored stool on 5/24, positive for blood.  Hemodynamically stable with stable hemoglobin.  RESOLVED  6/1-Awake was sitting yesterday still orthostatic-had episode tachycardia overnight-Atrial tachy/Fib  6/7 Awake is able to sit OOB and stand less dizzy  6/8-Lying in bed has not been OOB  6/13-Awake  no dyspnea at rest [facility] -reviewed admission note, no change since admission    MEDICATIONS  (STANDING):  ammonium lactate 12% Lotion 1 Application(s) Topical <User Schedule>  apixaban 5 milliGRAM(s) Oral two times a day  aspirin  chewable 81 milliGRAM(s) Oral daily  atorvastatin 80 milliGRAM(s) Oral at bedtime  calamine/zinc oxide Lotion 1 Application(s) Topical three times a day  dextrose 10% Bolus 125 milliLiter(s) IV Bolus once  dextrose 5%. 1000 milliLiter(s) (100 mL/Hr) IV Continuous <Continuous>  dextrose 5%. 1000 milliLiter(s) (50 mL/Hr) IV Continuous <Continuous>  dextrose 50% Injectable 25 Gram(s) IV Push once  dextrose 50% Injectable 12.5 Gram(s) IV Push once  droxidopa 600 milliGRAM(s) Oral three times a day  ferrous    sulfate 325 milliGRAM(s) Oral two times a day  fludroCORTISONE 0.1 milliGRAM(s) Oral daily  glucagon  Injectable 1 milliGRAM(s) IntraMuscular once  insulin glargine Injectable (LANTUS) 12 Unit(s) SubCutaneous at bedtime  insulin lispro (ADMELOG) corrective regimen sliding scale   SubCutaneous three times a day before meals  insulin lispro (ADMELOG) corrective regimen sliding scale   SubCutaneous <User Schedule>  insulin lispro Injectable (ADMELOG) 5 Unit(s) SubCutaneous before lunch  insulin lispro Injectable (ADMELOG) 3 Unit(s) SubCutaneous before dinner  insulin lispro Injectable (ADMELOG) 5 Unit(s) SubCutaneous before breakfast  levothyroxine 75 MICROGram(s) Oral daily  midodrine. 30 milliGRAM(s) Oral <User Schedule>  midodrine. 20 milliGRAM(s) Oral <User Schedule>  Nephro-molly 1 Tablet(s) Oral daily  pantoprazole    Tablet 40 milliGRAM(s) Oral every 24 hours  psyllium Powder 1 Packet(s) Oral three times a day  pyridostigmine 60 milliGRAM(s) Oral <User Schedule>  triamcinolone 0.1% Ointment 1 Application(s) Topical two times a day    MEDICATIONS  (PRN):  dextrose Oral Gel 15 Gram(s) Oral once PRN Blood Glucose LESS THAN 70 milliGRAM(s)/deciliter  loperamide 2 milliGRAM(s) Oral three times a day PRN loose stools            REVIEW OF SYSTEMS:  Constitutional: [ ] fever, [ ]weight loss,  [ ]fatigue [ ]weight gain  Eyes: [ ] visual changes  Respiratory: [ ]shortness of breath;  [ ] cough, [ ]wheezing, [ ]chills, [ ]hemoptysis  Cardiovascular: [ ] chest pain, [ ]palpitations, [ ]dizziness,  [ ]leg swelling[ ]orthopnea[ ]PND  Gastrointestinal: [ ] abdominal pain, [ ]nausea, [ ]vomiting,  [ ]diarrhea [ ]Constipation [ ]Melena  Genitourinary: [ ] dysuria, [ ] hematuria [ ][place]  Neurologic: [ ] headaches [ ] tremors[ ]weakness [ ]Paralysis Right[ ] Left[ ]  Skin: [ ] itching, [ ]burning, [ ] rashes  Endocrine: [ ] heat or cold intolerance  Musculoskeletal: [ ] joint pain or swelling; [ ] muscle, back, or extremity pain  Psychiatric: [ ] depression, [ ]anxiety, [ ]mood swings, or [ ]difficulty sleeping  Hematologic: [ ] easy bruising, [ ] bleeding gums    [ ] All remaining systems negative except as per above.   [ ]Unable to obtain.  [x] No change in ROS since admission      Vital Signs Last 24 Hrs  T(C): 36.6 (13 Jun 2024 04:34), Max: 36.7 (12 Jun 2024 20:54)  T(F): 97.9 (13 Jun 2024 04:34), Max: 98.1 (12 Jun 2024 20:54)  HR: 66 (13 Jun 2024 04:34) (65 - 66)  BP: 144/65 (13 Jun 2024 06:49) (144/65 - 153/66)  Orthostatics-improved  RR: 20 (13 Jun 2024 04:34) (18 - 20)  SpO2: 96% (13 Jun 2024 04:34) (95% - 96%)    Parameters below as of 13 Jun 2024 04:34  Patient On (Oxygen Delivery Method): room air      I&O's Summary    11 Jun 2024 07:01  -  12 Jun 2024 07:00  --------------------------------------------------------  IN: 200 mL / OUT: 1500 mL / NET: -1300 mL    12 Jun 2024 07:01  -  13 Jun 2024 06:52  --------------------------------------------------------  IN: 920 mL / OUT: 900 mL / NET: 20 mL        PHYSICAL EXAM:  General: No acute distress BMI-34  HEENT: EOMI, PERRL  Neck: Supple, [ ] JVD  Lungs: Equal air entry bilaterally; [ ] rales [ ] wheezing [ ] rhonchi  Heart: Regular rate and rhythm; [x ] murmur   2/6 [ x] systolic [ ] diastolic [ ] radiation[ ] rubs [ ]  gallops  Abdomen: Nontender, bowel sounds present  Extremities: No clubbing, cyanosis, [xx ] edema [ ]Pulses  equal and intact  Nervous system:  Alert & Oriented X3, no focal deficits  Psychiatric: Normal affect  Skin: No rashes or lesions    LABS:  06-11    134<L>  |  102  |  22  ----------------------------<  284<H>  5.1   |  16<L>  |  1.50<H>    Ca    8.8      11 Jun 2024 10:40  Phos  4.0     06-11  Mg     2.0     06-11      Creatinine Trend: 1.50<--, 1.63<--, 1.61<--, 2.06<--, 1.77<--, 1.95<--                        11.0   11.20 )-----------( 205      ( 11 Jun 2024 10:40 )             36.6

## 2024-06-13 NOTE — PROGRESS NOTE ADULT - ATTENDING COMMENTS
73F PMH: HFrEF (EF 30%), AS, LBBB, HTN, DM1, CKD, hypothyroidism, chronic lymphedema, LELIA (3L home O2) p/w for syncope 2/2 severe AS, s/p TAVR 4/24. Course complicated by contrast induced allergic reaction (had CTA for TAVR eval) and contrast related renal failure (acute on chronic) requiring HD. TAVR c/b VT and vfib requiring shock and flash pulmonary edema causing Acute hypoxic respiratory failure  requiring intubation.  Course complicated by contrast induced allergic reaction (had CTA for TAVR eval) and contrast related renal failure (acute on chronic) requiring CRRT for fluid removal and pressors for vasoplegia and hypovolemia due to CRRT. Patient extubated, now off pressors, CRRT.     1. Orthostatic hypotension- severe and persistent despite droxidopa, midodrine with florinef. Pyridostigmine started on 6/5. Improved.     2. Urinary retention- failed 3 voiding trials.   3. Left UE DVT- c/w eliquis. Repeat US showing persistent DVT noted in the left subclavian and axillary veins. ACE wrap for swelling.   4. T1DM- uncontrolled with intermittent episodes of labile BS. Being managed by endocrinology. Currently on Lantus and Admelog.  5. Frequent loose stools- Metamucil, prn Imodium.    Rehab planning.

## 2024-06-13 NOTE — PROVIDER CONTACT NOTE (HYPOGLYCEMIA EVENT) - NS PROVIDER CONTACT NOTE-TREATMENT-HYPO
Dextrose 50% 12.5 Grams IV Push/4 oz Fruit Juice...
4 oz Fruit Juice...
Dextrose 50% 25 Grams IV Push/4 oz Fruit Juice...
4 oz Fruit Juice...

## 2024-06-13 NOTE — PROVIDER CONTACT NOTE (HYPOGLYCEMIA EVENT) - NS PROVIDER CONTACT BACKGROUND-HYPO
Age: 73y    Gender: Female    POCT Blood Glucose:  177 mg/dL (06-02-24 @ 02:22)  148 mg/dL (06-02-24 @ 00:01)  129 mg/dL (06-01-24 @ 22:15)  49 mg/dL (06-01-24 @ 21:44)  47 mg/dL (06-01-24 @ 21:26)  51 mg/dL (06-01-24 @ 21:26)  89 mg/dL (06-01-24 @ 17:05)  146 mg/dL (06-01-24 @ 13:01)      eMAR:  atorvastatin   80 milliGRAM(s) Oral (06-01-24 @ 21:39)    dextrose 50% Injectable   25 Gram(s) IV Push (06-01-24 @ 21:57)    fludroCORTISONE   0.2 milliGRAM(s) Oral (06-01-24 @ 17:38)   0.2 milliGRAM(s) Oral (06-01-24 @ 05:27)    insulin glargine Injectable (LANTUS)   7 Unit(s) SubCutaneous (06-02-24 @ 00:01)    insulin lispro (ADMELOG) corrective regimen sliding scale   1 Unit(s) SubCutaneous (06-01-24 @ 10:21)    insulin lispro Injectable (ADMELOG)   8 Unit(s) SubCutaneous (06-01-24 @ 17:38)   8 Unit(s) SubCutaneous (06-01-24 @ 13:44)   8 Unit(s) SubCutaneous (06-01-24 @ 10:22)    levothyroxine   75 MICROGram(s) Oral (06-01-24 @ 05:26)    
Age: 73y    Gender: Female    POCT Blood Glucose:  82 mg/dL (06-13-24 @ 03:51)  68 mg/dL (06-13-24 @ 03:32)  112 mg/dL (06-12-24 @ 21:46)  190 mg/dL (06-12-24 @ 17:14)  235 mg/dL (06-12-24 @ 12:44)  133 mg/dL (06-12-24 @ 08:26)      eMAR:  atorvastatin   80 milliGRAM(s) Oral (06-12-24 @ 22:54)    fludroCORTISONE   0.1 milliGRAM(s) Oral (06-12-24 @ 06:05)    insulin glargine Injectable (LANTUS)   12 Unit(s) SubCutaneous (06-12-24 @ 22:35)    insulin lispro (ADMELOG) corrective regimen sliding scale   1 Unit(s) SubCutaneous (06-12-24 @ 17:43)   2 Unit(s) SubCutaneous (06-12-24 @ 12:54)    insulin lispro Injectable (ADMELOG)   5 Unit(s) SubCutaneous (06-12-24 @ 12:54)    insulin lispro Injectable (ADMELOG)   3 Unit(s) SubCutaneous (06-12-24 @ 17:43)    levothyroxine   75 MICROGram(s) Oral (06-12-24 @ 06:05)    
Age: 73y    Gender: Female    POCT Blood Glucose:  115 mg/dL (05-12-24 @ 09:31)  54 mg/dL (05-12-24 @ 08:43)  54 mg/dL (05-12-24 @ 08:41)  56 mg/dL (05-12-24 @ 08:21)  53 mg/dL (05-12-24 @ 08:18)  168 mg/dL (05-11-24 @ 21:51)  188 mg/dL (05-11-24 @ 17:24)  86 mg/dL (05-11-24 @ 12:33)      eMAR:  atorvastatin   80 milliGRAM(s) Oral (05-11-24 @ 21:09)    dextrose 50% Injectable   12.5 Gram(s) IV Push (05-12-24 @ 08:56)    insulin glargine Injectable (LANTUS)   16 Unit(s) SubCutaneous (05-11-24 @ 22:05)    insulin lispro (ADMELOG) corrective regimen sliding scale   1 Unit(s) SubCutaneous (05-11-24 @ 17:44)    insulin lispro Injectable (ADMELOG)   2 Unit(s) SubCutaneous (05-11-24 @ 17:43)    levothyroxine   75 MICROGram(s) Oral (05-12-24 @ 07:02)    
Age: 73y    Gender: Female    POCT Blood Glucose:  68 mg/dL (05-08-24 @ 11:59)  67 mg/dL (05-08-24 @ 11:56)  136 mg/dL (05-08-24 @ 08:33)  170 mg/dL (05-07-24 @ 22:10)  242 mg/dL (05-07-24 @ 17:04)  183 mg/dL (05-07-24 @ 12:06)      eMAR:  atorvastatin   80 milliGRAM(s) Oral (05-07-24 @ 22:11)    insulin glargine Injectable (LANTUS)   34 Unit(s) SubCutaneous (05-07-24 @ 22:10)    insulin lispro (ADMELOG) corrective regimen sliding scale   2 Unit(s) SubCutaneous (05-07-24 @ 17:07)    insulin lispro Injectable (ADMELOG)   8 Unit(s) SubCutaneous (05-08-24 @ 08:44)   8 Unit(s) SubCutaneous (05-07-24 @ 17:08)    levothyroxine   75 MICROGram(s) Oral (05-08-24 @ 05:00)

## 2024-06-13 NOTE — PROGRESS NOTE ADULT - NUTRITIONAL ASSESSMENT
This patient has been assessed with a concern for Malnutrition and has been determined to have a diagnosis/diagnoses of Morbid obesity (BMI > 40).    This patient is being managed with:   Diet Consistent Carbohydrate w/Evening Snack-  Kwesi(7 Gm Arginine/7 Gm Glut/1.2 Gm HMB     Qty per Day:  2  Supplement Feeding Modality:  Oral  Probiotic Yogurt/Smoothie Cans or Servings Per Day:  1       Frequency:  Daily  Entered: Jun 12 2024  1:05PM

## 2024-06-13 NOTE — PROVIDER CONTACT NOTE (HYPOGLYCEMIA EVENT) - NS PROVIDER CONTACT RECOMMEND-HYPO
half the dose of nighttime lantus
MD Yo Riley aware and notified. 
Pt given 4oz apple juice x2 per TABATHA Doshi. Pt asymptomatic. Will recheck glucose in 15 minutes.
review  bedtime insulin

## 2024-06-13 NOTE — PROGRESS NOTE ADULT - NSPROGADDITIONALINFOA_GEN_ALL_CORE
Contact via [employer] Teams during business hours  To reach covering provider access AMION via sunrise tools  For Urgent matters/after-hours/weekends/holidays please page endocrine fellow on call   For nonurgent matters please email [email or web address]    Please note that this patient may be followed by different provider tomorrow.  Notify endocrine 24 hours prior to discharge for final recommendations

## 2024-06-13 NOTE — PROGRESS NOTE ADULT - PROBLEM SELECTOR PLAN 1
- Check BG TID AC, HS, and 2AM  - Change Lantus 10 units to QHS   - C/w Admelog to 5-5-3 units before meal (hold if NPO or eats less than 50 % of meals)   - C/w Low dose Admelog low dose correction scale TID AC, low dose Admelog correction scale QHS and 2am in case of rebound hypo/hyperglycemia  - Please keep hypoglycemia protocol in place     Discharge:  - Will be determined according to BG/insulin needs/PO intake  at time of discharge; basal/bolus insulin doses TBD  - Of note, patient instructed on discharge from recent hospitalization at [facility] to start Farxiga for CHF. Given risks of DKA of SGLT2i in T1DM, would not start until better data is available for its benefits.  - Follow up with Dr. Luis Dumont outpatient  - Patient will likely required rehab  - F/u with optho/renal/cardiac as outpatient.

## 2024-06-13 NOTE — PROVIDER CONTACT NOTE (HYPOGLYCEMIA EVENT) - NS PROVIDER CONTACT CONTRIBUTING FACTORS OF EPISODE
Poor oral intake within the last 24 hours
Poor oral intake within the last 24 hours
None
Poor oral intake within the last 24 hours

## 2024-06-13 NOTE — PROGRESS NOTE ADULT - SUBJECTIVE AND OBJECTIVE BOX
****[facility] PGY-1*****    CHIEF COMPLAINT: Orthostatic hypotension    Overnight Events: Episode of hypoglycemia ~68   Interval Events: No acute concerns.     OBJECTIVE:  ICU Vital Signs Last 24 Hrs  T(C): 36.6 (13 Jun 2024 04:34), Max: 36.7 (12 Jun 2024 20:54)  T(F): 97.9 (13 Jun 2024 04:34), Max: 98.1 (12 Jun 2024 20:54)  HR: 66 (13 Jun 2024 04:34) (65 - 66)  BP: 144/65 (13 Jun 2024 06:49) (144/65 - 153/66)  BP(mean): --  ABP: --  ABP(mean): --  RR: 20 (13 Jun 2024 04:34) (18 - 20)  SpO2: 96% (13 Jun 2024 04:34) (95% - 96%)    O2 Parameters below as of 13 Jun 2024 04:34  Patient On (Oxygen Delivery Method): room air    06-12 @ 07:01  -  06-13 @ 07:00  --------------------------------------------------------  IN: 920 mL / OUT: 900 mL / NET: 20 mL    CAPILLARY BLOOD GLUCOSE  POCT Blood Glucose.: 125 mg/dL (13 Jun 2024 04:24)    PHYSICAL EXAM  General:   Head:    Eyes:   Neck:   Cardiac:   Lungs:   Abdomen:   Extremities:   Neuro  Psych:   Skin:  Etc:      HOSPITAL MEDICATIONS:  MEDICATIONS  (STANDING):  ammonium lactate 12% Lotion 1 Application(s) Topical <User Schedule>  apixaban 5 milliGRAM(s) Oral two times a day  aspirin  chewable 81 milliGRAM(s) Oral daily  atorvastatin 80 milliGRAM(s) Oral at bedtime  calamine/zinc oxide Lotion 1 Application(s) Topical three times a day  dextrose 10% Bolus 125 milliLiter(s) IV Bolus once  dextrose 5%. 1000 milliLiter(s) (100 mL/Hr) IV Continuous <Continuous>  dextrose 5%. 1000 milliLiter(s) (50 mL/Hr) IV Continuous <Continuous>  dextrose 50% Injectable 25 Gram(s) IV Push once  dextrose 50% Injectable 12.5 Gram(s) IV Push once  droxidopa 600 milliGRAM(s) Oral three times a day  ferrous    sulfate 325 milliGRAM(s) Oral two times a day  fludroCORTISONE 0.1 milliGRAM(s) Oral daily  glucagon  Injectable 1 milliGRAM(s) IntraMuscular once  insulin glargine Injectable (LANTUS) 12 Unit(s) SubCutaneous at bedtime  insulin lispro (ADMELOG) corrective regimen sliding scale   SubCutaneous three times a day before meals  insulin lispro (ADMELOG) corrective regimen sliding scale   SubCutaneous <User Schedule>  insulin lispro Injectable (ADMELOG) 5 Unit(s) SubCutaneous before breakfast  insulin lispro Injectable (ADMELOG) 5 Unit(s) SubCutaneous before lunch  insulin lispro Injectable (ADMELOG) 3 Unit(s) SubCutaneous before dinner  levothyroxine 75 MICROGram(s) Oral daily  midodrine. 30 milliGRAM(s) Oral <User Schedule>  midodrine. 20 milliGRAM(s) Oral <User Schedule>  Nephro-molly 1 Tablet(s) Oral daily  pantoprazole    Tablet 40 milliGRAM(s) Oral every 24 hours  psyllium Powder 1 Packet(s) Oral three times a day  pyridostigmine 60 milliGRAM(s) Oral <User Schedule>  triamcinolone 0.1% Ointment 1 Application(s) Topical two times a day    MEDICATIONS  (PRN):  dextrose Oral Gel 15 Gram(s) Oral once PRN Blood Glucose LESS THAN 70 milliGRAM(s)/deciliter  loperamide 2 milliGRAM(s) Oral three times a day PRN loose stools      LABS:                        11.0   11.20 )-----------( 205      ( 11 Jun 2024 10:40 )             36.6     Hgb Trend: 11.0<--, 10.5<--  06-11    134<L>  |  102  |  22  ----------------------------<  284<H>  5.1   |  16<L>  |  1.50<H>    Ca    8.8      11 Jun 2024 10:40  Phos  4.0     06-11  Mg     2.0     06-11      Creatinine Trend: 1.50<--, 1.63<--, 1.61<--, 2.06<--, 1.77<--, 1.95<--    Urinalysis Basic - ( 11 Jun 2024 10:40 )    Color: x / Appearance: x / SG: x / pH: x  Gluc: 284 mg/dL / Ketone: x  / Bili: x / Urobili: x   Blood: x / Protein: x / Nitrite: x   Leuk Esterase: x / RBC: x / WBC x   Sq Epi: x / Non Sq Epi: x / Bacteria: x            MICROBIOLOGY:       RADIOLOGY:  [ ] Reviewed by me

## 2024-06-13 NOTE — PROGRESS NOTE ADULT - ASSESSMENT
73F w/h/o of T1DM with unknown control due to CKD and anemia of chronic disease. DM c/b CKD. Also h/o HFrEF (EF 30%), mod AS, known LBBB, HTN,  hypothyroidism, chronic lymphedema, suspected OHS/LELIA on 3L NC home O2 p/w 1 episode of syncope with R arm jerking, likely 2/2 severe symptomatic AS. S/p AVR 4/24 c/b DUNCAN on CKD, fever and hypotension. Also UTI/pul edema/ sepsis and L adrenal nodule finding. Also orthostatic hypotension > now on Florinef. Last 24 hour BGs variable 68-235mg/dL. Noted hypoglycemia overnight to 68mg/dL at 2AM, patient asymptomatic. Back up to 125mg/dL with protocol. Patient notes she did not eat that much yesterday since she was feeling unwell, n/v, and then still took her full Lantus dose HS. She feels she should have held her nutritional insulin at dinner and asked the RN for half of the Lantus dose. Patient refused insulin with breakfast given BG 96mg/dL. Will adjust Lantus HS given FBG low. Will closely monitor BG levels during the day. Pateint is aware to hold meal-time insulin if not eating or eating <50% of her meal. Patient happy to be back on her Lnatus QHS instead of QAM.    Per endocrine attending Dr Burton> Adrenal nodule work up completed and patient will need to f/u once she is more stable as out pt.     Home regimen: Lantus 33 units qhs, Novolog based on sliding scale TID AC normally 3-5 units  Has Dexcom G7

## 2024-06-13 NOTE — PROGRESS NOTE ADULT - ASSESSMENT
73F w HFrEF (EF 30%), mod AS, known LBBB, HTN, IDDM1, CKD, hypothyroidism, chronic lymphedema, p/w 1 episode of syncope with R arm jerking.     #Syncope-Aortic Stenosis  - AS in setting of decreased LVEF  - s/p tavr on 4/24 c/b VT -> shock -> VFib -> shock  - hypoxic/congested, and was intubated, extubated on 4/25  - on 4/25 with worsening bradycardia, s/p TVP placement followed by AV Micra placement   - Remains in Sinus Rhythm/known lbbb with intermittent   - on high dose midodrine, droxidopa and florinef in the setting of orthostasis.  - TTE on 5/21 with global LV dysfunction severely decreased. Well seated TAVR, limited echo 6/4 with unchanged mod lv and no sig changes overall  - to consider CRT-D in the future  - Cannot initiate GDMT due to orthostasis  - would try to mobilize as best as possible and monitor orthostatics, hopefully standing bp can be kept >100.   - cont asa and statin  - Hgb stable     #Chronic Systolic HF (EF 30%), mod to severe AS, HTN, LBBB, Lymphedema   - Has known systolic HF and AS.    - Repeat TTE showed EF 30%, mild-mod LVH, mildly reduced RVF, mod-severe AS (.61 cmsq), mod pHTN  - Repeat limited study with normal function TAVR  - On home Torsemide 20 mg daily, Eplerenone 25 mg daily, and Toprol  mg daily, all currently on hold  - On Midodrine 30mg TID for orthostatic hypotension  - Droxidopa now initiated as well for significant orthostasis upto 600mg TID  - Unable to initiate GDMT at this time due to significant orthostatic hypotension  - initially CVVHD, then HD  - HD now on hold, as she is urinating and creatinine remains stable,   - Creatinine improving  - prn iv bumex  - Diagnosed with UE DVT and now on full loading dose of Eliquis  - PT and Bed to chair as much as possible    #Orthostatic hypotension.   - would attempt orthostatics daily.  Hoping to wean off midodrine in order to restart GDMT. Orthostatics improving from supine to seated, but still problematic with standing. Remains orthostatic and symptomatic on standing  - Repeat othostatics-improved-Florinef decreased 2/2 increased edema, midodrine increased to 30mgBID 20mg OD as still symptomatic with standing  - Overall symptoms are markedly improving, though gradually. Still orthostatic as of 6/11  - On Pyridostigmine 60md TID, increased on 6/10  - cont droxidopa  - Repeat orthostatics and OOB -Improved  - would have pt sit in chair as much as possible.     # Atrial Fibrillation  - Reported episode over the weekend (6/1-6/2)  - Is on AC for DVT  - Will need outpatient monitoring  - Continue telemetry    #UTI  - Resolved  - Abx as per ID

## 2024-06-13 NOTE — PROGRESS NOTE ADULT - TIME BILLING
- Review of records, telemetry, vital signs and daily labs.   - General and cardiovascular physical examination.  - Generation of cardiovascular treatment plan.  - Coordination of care.      Patient was seen and examined by me on 06/13/2024,interim events noted,labs and radiology studies reviewed.  Cuco Tubbs MD,FACC.  [address] [place], [place] [id number].  [phone]

## 2024-06-13 NOTE — PROGRESS NOTE ADULT - SUBJECTIVE AND OBJECTIVE BOX
Patient seen today for follow up inpatient Diabetes Mellitus management.    Chief Complaint: Type 1 Diabetes Mellitus     INTERVAL HX:  Patient seen in [facility] [id number] W1. Patient is alert and oriented, resting in bed. BG have been variable depending on patients inconsistent PO intake- continues on a Consistent Carbohydrate Diet. Patient has been tolerating PO diet well this morning- noted yesterday she felt nauseous and threw up, little appatite. She states she ate breakfast this morning. Blood glucose levels in the last 24hrs have been 68-235mg/dL.     Review of Systems:  General: As above.  Respiratory: Denies any SOB, CURTIS, or cough.  Gastrointestinal: Denies any n/v/d or abdominal pain.   Endocrine: Denies any polyuria, polydipsia, polyphagia, visual changes, or numbness in feet.     Allergies  contrast media (iodine-based) (Fever; Vomiting; Flushing; Hypotension; Rash; Stomach Upset)  Ativan (Rash; Urticaria)  penicillins (Hives (Mod to Severe); Anaphylaxis (Mod to Severe); Short breath (Mod to Severe); Angioedema (Mod to Severe))    Intolerances  None.     MEDICATIONS  (STANDING):  ammonium lactate 12% Lotion 1 Application(s) Topical <User Schedule>  apixaban 5 milliGRAM(s) Oral two times a day  aspirin  chewable 81 milliGRAM(s) Oral daily  atorvastatin 80 milliGRAM(s) Oral at bedtime  calamine/zinc oxide Lotion 1 Application(s) Topical three times a day  dextrose 10% Bolus 125 milliLiter(s) IV Bolus once  dextrose 5%. 1000 milliLiter(s) IV Continuous <Continuous>  dextrose 5%. 1000 milliLiter(s) IV Continuous <Continuous>  dextrose 50% Injectable 25 Gram(s) IV Push once  dextrose 50% Injectable 12.5 Gram(s) IV Push once  dextrose Oral Gel 15 Gram(s) Oral once PRN  droxidopa 600 milliGRAM(s) Oral three times a day  ferrous    sulfate 325 milliGRAM(s) Oral two times a day  fludroCORTISONE 0.1 milliGRAM(s) Oral daily  glucagon  Injectable 1 milliGRAM(s) IntraMuscular once  insulin glargine Injectable (LANTUS) 10 Unit(s) SubCutaneous at bedtime  insulin lispro (ADMELOG) corrective regimen sliding scale   SubCutaneous three times a day before meals  insulin lispro (ADMELOG) corrective regimen sliding scale   SubCutaneous <User Schedule>  insulin lispro Injectable (ADMELOG) 5 Unit(s) SubCutaneous before breakfast  insulin lispro Injectable (ADMELOG) 5 Unit(s) SubCutaneous before lunch  insulin lispro Injectable (ADMELOG) 3 Unit(s) SubCutaneous before dinner  levothyroxine 75 MICROGram(s) Oral daily  loperamide 2 milliGRAM(s) Oral three times a day PRN  midodrine. 30 milliGRAM(s) Oral <User Schedule>  midodrine. 20 milliGRAM(s) Oral <User Schedule>  Nephro-molly 1 Tablet(s) Oral daily  pantoprazole    Tablet 40 milliGRAM(s) Oral every 24 hours  psyllium Powder 1 Packet(s) Oral three times a day  pyridostigmine 60 milliGRAM(s) Oral <User Schedule>  triamcinolone 0.1% Ointment 1 Application(s) Topical two times a day      atorvastatin 80 milliGRAM(s) Oral at bedtime  dextrose 50% Injectable 25 Gram(s) IV Push once  dextrose 50% Injectable 12.5 Gram(s) IV Push once  dextrose Oral Gel 15 Gram(s) Oral once PRN  fludroCORTISONE 0.1 milliGRAM(s) Oral daily  glucagon  Injectable 1 milliGRAM(s) IntraMuscular once  insulin glargine Injectable (LANTUS) 10 Unit(s) SubCutaneous at bedtime  insulin lispro (ADMELOG) corrective regimen sliding scale   SubCutaneous three times a day before meals  insulin lispro (ADMELOG) corrective regimen sliding scale   SubCutaneous <User Schedule>  insulin lispro Injectable (ADMELOG) 5 Unit(s) SubCutaneous before breakfast  insulin lispro Injectable (ADMELOG) 5 Unit(s) SubCutaneous before lunch  insulin lispro Injectable (ADMELOG) 3 Unit(s) SubCutaneous before dinner  levothyroxine 75 MICROGram(s) Oral daily      insulin lispro (ADMELOG) corrective regimen sliding scale   SubCutaneous three times a day before meals  insulin lispro (ADMELOG) corrective regimen sliding scale   SubCutaneous <User Schedule>  insulin lispro Injectable (ADMELOG) 5 Unit(s) SubCutaneous before breakfast  insulin lispro Injectable (ADMELOG) 5 Unit(s) SubCutaneous before lunch  insulin lispro Injectable (ADMELOG) 3 Unit(s) SubCutaneous before dinner      PHYSICAL EXAM:  VITALS:   T(C): 36.6 (06-13-24 @ 04:34), Max: 36.7 (06-12-24 @ 20:54)  HR: 66 (06-13-24 @ 04:34) (65 - 66)  BP: 144/65 (06-13-24 @ 06:49) (144/65 - 153/66)  RR: 20 (06-13-24 @ 04:34) (18 - 20)  SpO2: 96% (06-13-24 @ 04:34) (95% - 96%)    GENERAL: In no acute distress  Respiratory: Respirations unlabored  Extremities: Warm and dry, BLE edema wrapped in ACE bandage  NEURO: Alert and oriented, appropriate     LABS:  POCT Blood Glucose.: 96 mg/dL (06-13-24 @ 09:02)  POCT Blood Glucose.: 125 mg/dL (06-13-24 @ 04:24)  POCT Blood Glucose.: 82 mg/dL (06-13-24 @ 03:51)  POCT Blood Glucose.: 68 mg/dL (06-13-24 @ 03:32)  POCT Blood Glucose.: 112 mg/dL (06-12-24 @ 21:46)  POCT Blood Glucose.: 190 mg/dL (06-12-24 @ 17:14)  POCT Blood Glucose.: 235 mg/dL (06-12-24 @ 12:44)  POCT Blood Glucose.: 133 mg/dL (06-12-24 @ 08:26)  POCT Blood Glucose.: 165 mg/dL (06-12-24 @ 02:52)  POCT Blood Glucose.: 158 mg/dL (06-11-24 @ 21:10)  POCT Blood Glucose.: 199 mg/dL (06-11-24 @ 17:57)  POCT Blood Glucose.: 189 mg/dL (06-11-24 @ 12:42)  POCT Blood Glucose.: 231 mg/dL (06-11-24 @ 10:01)  POCT Blood Glucose.: 198 mg/dL (06-11-24 @ 08:45)  POCT Blood Glucose.: 253 mg/dL (06-11-24 @ 02:07)  POCT Blood Glucose.: 282 mg/dL (06-10-24 @ 22:13)  POCT Blood Glucose.: 288 mg/dL (06-10-24 @ 17:42)  POCT Blood Glucose.: 332 mg/dL (06-10-24 @ 12:35)                          10.9   7.80  )-----------( 176      ( 13 Jun 2024 09:35 )             36.1     06-13    139  |  107  |  20  ----------------------------<  99  4.6   |  20<L>  |  1.66<H>    Ca    8.7      13 Jun 2024 09:35  Phos  3.8     06-13  Mg     2.0     06-13      Urinalysis Basic - ( 13 Jun 2024 09:35 )    Color: x / Appearance: x / SG: x / pH: x  Gluc: 99 mg/dL / Ketone: x  / Bili: x / Urobili: x   Blood: x / Protein: x / Nitrite: x   Leuk Esterase: x / RBC: x / WBC x   Sq Epi: x / Non Sq Epi: x / Bacteria: x      A1C with Estimated Average Glucose Result: A1C with Estimated Average Glucose Result: 7.4 % (03-30-24 @ 08:21)  A1C with Estimated Average Glucose Result: 6.8 % (01-10-24 @ 08:37)

## 2024-06-13 NOTE — PROGRESS NOTE ADULT - SUBJECTIVE AND OBJECTIVE BOX
ate breakfast  wasn't feeling well yesterday    REVIEW OF SYSTEMS  Constitutional - No fever,  No fatigue  HEENT - No vertigo, No neck pain    FUNCTIONAL PROGRESS  PT 6/11  bed mobility supervision   transfers min assist x 2, non mech device   orthostatic     OT 6/10  bed mobility min assist x2  transfers min assist x 2, non mech device   toilet training max assist     VITALS  T(C): 36.6 (06-13-24 @ 04:34), Max: 36.7 (06-12-24 @ 20:54)  HR: 66 (06-13-24 @ 04:34) (65 - 66)  BP: 144/65 (06-13-24 @ 06:49) (144/65 - 153/66)  RR: 20 (06-13-24 @ 04:34) (18 - 20)  SpO2: 96% (06-13-24 @ 04:34) (95% - 96%)  Wt(kg): --    MEDICATIONS   ammonium lactate 12% Lotion 1 Application(s) <User Schedule>  apixaban 5 milliGRAM(s) two times a day  aspirin  chewable 81 milliGRAM(s) daily  atorvastatin 80 milliGRAM(s) at bedtime  calamine/zinc oxide Lotion 1 Application(s) three times a day  dextrose 10% Bolus 125 milliLiter(s) once  dextrose 5%. 1000 milliLiter(s) <Continuous>  dextrose 5%. 1000 milliLiter(s) <Continuous>  dextrose 50% Injectable 25 Gram(s) once  dextrose 50% Injectable 12.5 Gram(s) once  dextrose Oral Gel 15 Gram(s) once PRN  droxidopa 600 milliGRAM(s) three times a day  ferrous    sulfate 325 milliGRAM(s) two times a day  fludroCORTISONE 0.1 milliGRAM(s) daily  glucagon  Injectable 1 milliGRAM(s) once  insulin glargine Injectable (LANTUS) 10 Unit(s) at bedtime  insulin lispro (ADMELOG) corrective regimen sliding scale   three times a day before meals  insulin lispro (ADMELOG) corrective regimen sliding scale   <User Schedule>  insulin lispro Injectable (ADMELOG) 5 Unit(s) before breakfast  insulin lispro Injectable (ADMELOG) 5 Unit(s) before lunch  insulin lispro Injectable (ADMELOG) 3 Unit(s) before dinner  levothyroxine 75 MICROGram(s) daily  loperamide 2 milliGRAM(s) three times a day PRN  midodrine. 30 milliGRAM(s) <User Schedule>  midodrine. 20 milliGRAM(s) <User Schedule>  Nephro-molly 1 Tablet(s) daily  pantoprazole    Tablet 40 milliGRAM(s) every 24 hours  psyllium Powder 1 Packet(s) three times a day  pyridostigmine 60 milliGRAM(s) <User Schedule>  triamcinolone 0.1% Ointment 1 Application(s) two times a day      RECENT LABS - Reviewed                        10.9   7.80  )-----------( 176      ( 13 Jun 2024 09:35 )             36.1     06-13    139  |  107  |  20  ----------------------------<  99  4.6   |  20<L>  |  1.66<H>    Ca    8.7      13 Jun 2024 09:35  Phos  3.8     06-13  Mg     2.0     06-13        Urinalysis Basic - ( 13 Jun 2024 09:35 )    Color: x / Appearance: x / SG: x / pH: x  Gluc: 99 mg/dL / Ketone: x  / Bili: x / Urobili: x   Blood: x / Protein: x / Nitrite: x   Leuk Esterase: x / RBC: x / WBC x   Sq Epi: x / Non Sq Epi: x / Bacteria: x      --------------------------------------------------------------------------------  PHYSICAL EXAM  Constitutional - sitting up in bed, NAD  Chest - Breathing comfortably on room air   Cardiovascular - S1S2   Extremities - B/L LE in Ace wraps, right arm skin dry, excoriations/blood on pillow   Neurologic Exam -    follows commands                 Cognitive - Awake, Alert, AAO to self, place, date, year, situation     Communication - Fluent, No dysarthria        Motor -moves all ext 5/5     Sensory - Intact to LT     Psychiatric - Mood stable, Affect WNL  ----------------------------------------------------------------------------------------  ASSESSMENT/PLAN  73yFemale h/o AS, HFrEF, LBBB, DM type 1, CKD, HTN, hypothyroid, chronic lymphedema, LELIA on home O2 with functional deficits after TAVR, with debility   orthostatic hypotension, on midodrine, droxidopa, fludrocortisone, cardiology following   GI bleed, Hb stable   skin, asking to shower   LUE DVT on eliquis     Rehab - Will continue to follow for ongoing rehab needs and recommendations.    continue bedside therapy, out of bed to chair daily    limited by orthostatic hypotension, only able to sit at side of bed     Recommend ACUTE inpatient rehabilitation for the functional deficits consisting of 3 hours of therapy/day x 2-4 weeks depending on progress at rehabilitation facility, 24 hour RN/daily PMR physician for comorbid medical management. Patient will be able to tolerate 3 hours a day.                  35 minutes spent on total encounter  with chart review of PT/OT notes, exam, imaging, counseling and education on inpatient rehabilitation, coordination of care with rehab team and

## 2024-06-13 NOTE — PROGRESS NOTE ADULT - ASSESSMENT
73F w HFrEF (EF 30%), mod AS, known LBBB, HTN, IDDM1, CKD, hypothyroidism, chronic lymphedema, suspected OHS/LELIA on 3L NC home O2 p/w 1 episode of syncope. Recent admission at [facility] (3/29-4/5) for ADHF and DUNCAN s/p diuresis, discharged with new home O2 3L NC suspecting OHS/LELIA pending outpatient w/u. Since discharge a week ago, she has been staying at home with   Pt had sob walking to car. Once in car, she was still breathing heavily. Partner noticed pt was not responding to verbal stimuli for 10-15sec and witnessed R arm jerking. Pt gained consciousness quickly without postictal symptoms. Pt went to Cardiologist Dr. Nathan who recommended pt to come to ED. No fever, cp, abd pain, n/v. Leg swelling is improved from prior. Pt on torsemide 40mg which she has been taking. Pt was discharged on 3LNC which she is currently on. Patient reports she did not take Farxiga that she was discharged on as she was concerned about side effects.     In ED, patient was found afebrile, hemodynamically stable, breathing well on 3L NC. Initial labs notable for mild hyponatremia, DUNCAN on CKD, elevated proBNP and elevated pCO2 both improved from prior.  pt also noticed with abn creatinine. had severe AS had ct scan with IV contrast had contrast nephropathy and hypotension ---> CRRT required       1- CKD IV  2- CHF   3- lymphedema   4- severe AS  s/p tavr 4/24  5- hypotension orthostatic       creatinine is overall steady in this range  her edema has been worsening, and bed weight has been rising  diuretics have not been restarted due to persistent orthostasis   orthostatic hypotension,  midodrine 30 mg am, and afternoon  midodrine 20 mg bedtime,  northera increase to 600 mg TID   cont mestinone 30 mg 8 am and 1 pm   florinef 0.1 mg daily stop after 5 days  continue to check orthostatic bp  also PT for conditioning   anemia, hb higher  now off epogen  non-oliguric,

## 2024-06-13 NOTE — PROGRESS NOTE ADULT - ASSESSMENT
Ms Alina Chowdhury is a 72 y/o F with PMHx HFrEF (EF 30%), AS, LBBB, HTN, DM1, CKD, hypothyroidism, chronic lymphedema, LELIA (3L home O2) who presented for syncope 2/2 severe AS, now s/p TAVR 4/24 c/b by VT and Vfib requiring shock and flash pulmonary edema requiring intubation. Course complicated by contrast induced allergic reaction (had CTA for TAVR eval) and contrast related renal failure (acute on chronic) requiring CRRT for fluid removal and pressors for vasoplegia and hypovolemia due to CRRT. Patient now extubated, off pressors, CRRT and downgraded to medicine floors now c/b severe orthostatic hypotension requiring midodrine, droxidopa, and fludrocortisone, as well as melena which has resolved, now pending HONEY but c/b persistent orthostatic hypotension.

## 2024-06-14 NOTE — DISCHARGE NOTE NURSING/CASE MANAGEMENT/SOCIAL WORK - NSDCPEFALRISK_GEN_ALL_CORE
For information on Fall & Injury Prevention, visit: [email or web address] OR  [email or web address] OR  https://www.cdc.gov/steadi/patient.html
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

## 2024-06-14 NOTE — DISCHARGE NOTE NURSING/CASE MANAGEMENT/SOCIAL WORK - PATIENT PORTAL LINK FT
You can access the FollowMyHealth Patient Portal offered by [facility] by registering at the following website: [email or web address]. By joining [employer]’s FollowMyHealth portal, you will also be able to view your health information using other applications (apps) compatible with our system.

## 2024-06-14 NOTE — PROGRESS NOTE ADULT - ATTENDING COMMENTS
73F PMH: HFrEF (EF 30%), AS, LBBB, HTN, DM1, CKD, hypothyroidism, chronic lymphedema, LELIA (3L home O2) p/w for syncope 2/2 severe AS, s/p TAVR 4/24. Course complicated by contrast induced allergic reaction (had CTA for TAVR eval) and contrast related renal failure (acute on chronic) requiring HD. TAVR c/b VT and vfib requiring shock and flash pulmonary edema causing Acute hypoxic respiratory failure  requiring intubation.  Course complicated by contrast induced allergic reaction (had CTA for TAVR eval) and contrast related renal failure (acute on chronic) requiring CRRT for fluid removal and pressors for vasoplegia and hypovolemia due to CRRT. Patient extubated, now off pressors, CRRT.     1. Orthostatic hypotension- severe and persistent despite droxidopa, midodrine with florinef. Pyridostigmine started on 6/5. Improved.     2. Urinary retention- failed 3 voiding trials. Repeat trial in rehab once more ambulatory.   3. Left UE DVT- c/w eliquis. Repeat US showing persistent DVT noted in the left subclavian and axillary veins. ACE wrap for swelling.   4. T1DM- uncontrolled with intermittent episodes of labile BS. Being managed by endocrinology. Currently on Lantus and Admelog.  5. Frequent loose stools- Metamucil, prn Imodium.    Rehab today. D/c time 45 mins.

## 2024-06-14 NOTE — PROGRESS NOTE ADULT - REASON FOR ADMISSION
Syncope

## 2024-06-14 NOTE — PROGRESS NOTE ADULT - ASSESSMENT
73F w HFrEF (EF 30%), mod AS, known LBBB, HTN, IDDM1, CKD, hypothyroidism, chronic lymphedema, p/w 1 episode of syncope with R arm jerking.     #Syncope-Aortic Stenosis  - AS in setting of decreased LVEF  - s/p tavr on 4/24 c/b VT -> shock -> VFib -> shock  - hypoxic/congested, and was intubated, extubated on 4/25  - on 4/25 with worsening bradycardia, s/p TVP placement followed by AV Micra placement   - Remains in Sinus Rhythm/known lbbb with intermittent   - on high dose midodrine, droxidopa and florinef in the setting of orthostasis.  - TTE on 5/21 with global LV dysfunction severely decreased. Well seated TAVR, limited echo 6/4 with unchanged mod lv and no sig changes overall  - to consider CRT-D in the future. Cannot initiate GDMT due to orthostasis  - would try to mobilize as best as possible and monitor orthostatics, hopefully standing bp can be kept >100.   - cont asa and statin  - Hgb stable     #Chronic Systolic HF (EF 30%), mod to severe AS, HTN, LBBB, Lymphedema   - Has known systolic HF and AS.    - Repeat TTE showed EF 30%, mild-mod LVH, mildly reduced RVF, mod-severe AS (.61 cmsq), mod pHTN  - Repeat limited study with normal function TAVR  - On home Torsemide 20 mg daily, Eplerenone 25 mg daily, and Toprol  mg daily, all currently on hold  - Unable to initiate GDMT at this time due to significant orthostatic hypotension  - initially CVVHD, then HD. HD now on hold, as she is urinating and creatinine remains stable,   - Creatinine improving  - prn iv bumex  - Diagnosed with UE DVT and now on full loading dose of Eliquis  - PT and Bed to chair as much as possible    #Orthostatic hypotension.   - would attempt orthostatics daily.  Hoping to wean off midodrine in order to restart GDMT. Orthostatics improving from supine to seated, but still problematic with standing. Remains orthostatic and symptomatic on standing  - Repeat othostatics-improved-Florinef decreased 2/2 increased edema, midodrine increased to 30mgBID 20mg OD as still symptomatic with standing  - Overall symptoms are markedly improving, though gradually. Still orthostatic as of 6/11  - On Pyridostigmine 60md TID, increased on 6/10  - cont droxidopa  - Repeat orthostatics and OOB -Improved today  - would have pt sit in chair as much as possible.     # Atrial Fibrillation  - Reported episode over the weekend (6/1-6/2)  - Is on AC for DVT  - Will need outpatient monitoring  - Continue telemetry    #UTI  - Resolved  - Abx as per ID

## 2024-06-14 NOTE — PROGRESS NOTE ADULT - PROBLEM SELECTOR PLAN 1
- Check BG TID AC, HS, and 2AM  - Decrease  Lantus 8units to QHS   - C/w Admelog to 5-5-3 units before meal (hold if NPO or eats less than 50 % of meals)   - C/w Low dose Admelog low dose correction scale TID AC, low dose Admelog correction scale QHS and 2am in case of rebound hypo/hyperglycemia  - Please keep hypoglycemia protocol in place   Discharge:  - Will be determined according to BG/insulin needs/PO intake  at time of discharge; basal/bolus insulin doses as above Lantus 8 units QHS Admelog 5-5-3units with each meal  - Of note, patient instructed on discharge from recent hospitalization at [place] to start Farxiga for CHF. Given risks of DKA of SGLT2i in T1DM, would not start until better data is available for its benefits.  - Follow up with Dr. Luis Dumont outpatient    - F/u with optho/renal/cardiac as outpatient.

## 2024-06-14 NOTE — DISCHARGE NOTE NURSING/CASE MANAGEMENT/SOCIAL WORK - NSDCPEELIQUIS_GEN_ALL_CORE
Apixaban/Eliquis - Compliance/Apixaban/Eliquis - Dietary Advice/Apixaban/Eliquis - Follow up monitoring/Apixaban/Eliquis - Potential for adverse drug reactions and interactions Initial (On Arrival)

## 2024-06-14 NOTE — PROGRESS NOTE ADULT - ASSESSMENT
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 Course complicated by contrast induced allergic reaction (had CTA for TAVR eval) and contrast related renal failure (acute on chronic) requiring CRRT for fluid removal and pressors for vasoplegia and hypovolemia due to CRRT. Patient now extubated, off pressors, CRRT and downgraded to medicine floors now c/b severe orthostatic hypotension requiring midodrine, droxidopa, and fludrocortisone, as well as melena which has resolved, now pending HONEY but c/b persistent orthostatic hypotension.

## 2024-06-14 NOTE — PROGRESS NOTE ADULT - SUBJECTIVE AND OBJECTIVE BOX
[facility] - Howard Kimble, Cherise, Carlos, Herman Alston  Office Number:  [phone]    Patient resting comfortably in bed in NAD.  Laying flat with no respiratory distress.  No complaints of chest pain, dyspnea, palpitations, PND, or orthopnea.    ROS: negative unless otherwise mentioned.    Telemetry:  off    MEDICATIONS  (STANDING):  ammonium lactate 12% Lotion 1 Application(s) Topical <User Schedule>  apixaban 5 milliGRAM(s) Oral two times a day  aspirin  chewable 81 milliGRAM(s) Oral daily  atorvastatin 80 milliGRAM(s) Oral at bedtime  calamine/zinc oxide Lotion 1 Application(s) Topical three times a day  dextrose 10% Bolus 125 milliLiter(s) IV Bolus once  dextrose 5%. 1000 milliLiter(s) (100 mL/Hr) IV Continuous <Continuous>  dextrose 5%. 1000 milliLiter(s) (50 mL/Hr) IV Continuous <Continuous>  dextrose 50% Injectable 25 Gram(s) IV Push once  dextrose 50% Injectable 12.5 Gram(s) IV Push once  droxidopa 600 milliGRAM(s) Oral three times a day  ferrous    sulfate 325 milliGRAM(s) Oral two times a day  fludroCORTISONE 0.1 milliGRAM(s) Oral daily  glucagon  Injectable 1 milliGRAM(s) IntraMuscular once  insulin glargine Injectable (LANTUS) 10 Unit(s) SubCutaneous at bedtime  insulin lispro (ADMELOG) corrective regimen sliding scale   SubCutaneous three times a day before meals  insulin lispro (ADMELOG) corrective regimen sliding scale   SubCutaneous <User Schedule>  insulin lispro Injectable (ADMELOG) 2 Unit(s) SubCutaneous before lunch  insulin lispro Injectable (ADMELOG) 3 Unit(s) SubCutaneous before dinner  insulin lispro Injectable (ADMELOG) 5 Unit(s) SubCutaneous before breakfast  levothyroxine 75 MICROGram(s) Oral daily  midodrine. 30 milliGRAM(s) Oral <User Schedule>  midodrine. 20 milliGRAM(s) Oral <User Schedule>  Nephro-molly 1 Tablet(s) Oral daily  pantoprazole    Tablet 40 milliGRAM(s) Oral every 24 hours  psyllium Powder 1 Packet(s) Oral three times a day  pyridostigmine 60 milliGRAM(s) Oral <User Schedule>  triamcinolone 0.1% Ointment 1 Application(s) Topical two times a day    MEDICATIONS  (PRN):  dextrose Oral Gel 15 Gram(s) Oral once PRN Blood Glucose LESS THAN 70 milliGRAM(s)/deciliter  loperamide 2 milliGRAM(s) Oral three times a day PRN loose stools      Allergies    contrast media (iodine-based) (Fever; Vomiting; Flushing; Hypotension; Rash; Stomach Upset)  Ativan (Rash; Urticaria)  penicillins (Hives (Mod to Severe); Anaphylaxis (Mod to Severe); Short breath (Mod to Severe); Angioedema (Mod to Severe))    Intolerances        Vital Signs Last 24 Hrs  T(C): 36.3 (14 Jun 2024 12:13), Max: 36.8 (13 Jun 2024 20:14)  T(F): 97.3 (14 Jun 2024 12:13), Max: 98.2 (13 Jun 2024 20:14)  HR: 64 (14 Jun 2024 05:36) (64 - 80)  BP: 135/57 (14 Jun 2024 05:36) (135/57 - 155/67)  BP(mean): --  RR: 18 (14 Jun 2024 05:36) (18 - 18)  SpO2: 96% (14 Jun 2024 05:36) (96% - 97%)    Parameters below as of 14 Jun 2024 05:36  Patient On (Oxygen Delivery Method): room air        I&O's Summary    13 Jun 2024 07:01  -  14 Jun 2024 07:00  --------------------------------------------------------  IN: 1080 mL / OUT: 1450 mL / NET: -370 mL        ON EXAM:    Constitutional: NAD, awake   HEENT: Moist Mucous Membranes, Anicteric  Pulmonary: Decreased breath sounds b/l. No rales, crackles or wheeze appreciated.   Cardiovascular: Regular, S1 and S2, + SM   Gastrointestinal: Bowel Sounds present, soft, nontender.   Lymph: + peripheral edema. No lymphadenopathy.  Skin: No visible rashes or ulcers.  Psych:  Mood & affect appropriate for situation      LABS: All Labs Reviewed:                        10.9   7.80  )-----------( 176      ( 13 Jun 2024 09:35 )             36.1     13 Jun 2024 09:35    139    |  107    |  20     ----------------------------<  99     4.6     |  20     |  1.66     Ca    8.7        13 Jun 2024 09:35  Phos  3.8       13 Jun 2024 09:35  Mg     2.0       13 Jun 2024 09:35            Blood Culture:

## 2024-06-14 NOTE — PROGRESS NOTE ADULT - SUBJECTIVE AND OBJECTIVE BOX
PROGRESS NOTE:    Rodney Denise MD  Internal Medicine PGY-1  Available on [employer] Teams      Patient is a 73y old  Female who presents with a chief complaint of Syncope (13 Jun 2024 19:58)      SUBJECTIVE / OVERNIGHT EVENTS: No acute overnight events. Pt seen and examined. Denies fevers, chills, CP, SOB, Abdominal pain, N/V, Constipation, Diarrhea      MEDICATIONS  (STANDING):  ammonium lactate 12% Lotion 1 Application(s) Topical <User Schedule>  apixaban 5 milliGRAM(s) Oral two times a day  aspirin  chewable 81 milliGRAM(s) Oral daily  atorvastatin 80 milliGRAM(s) Oral at bedtime  calamine/zinc oxide Lotion 1 Application(s) Topical three times a day  dextrose 10% Bolus 125 milliLiter(s) IV Bolus once  dextrose 5%. 1000 milliLiter(s) (100 mL/Hr) IV Continuous <Continuous>  dextrose 5%. 1000 milliLiter(s) (50 mL/Hr) IV Continuous <Continuous>  dextrose 50% Injectable 25 Gram(s) IV Push once  dextrose 50% Injectable 12.5 Gram(s) IV Push once  droxidopa 600 milliGRAM(s) Oral three times a day  ferrous    sulfate 325 milliGRAM(s) Oral two times a day  fludroCORTISONE 0.1 milliGRAM(s) Oral daily  glucagon  Injectable 1 milliGRAM(s) IntraMuscular once  insulin glargine Injectable (LANTUS) 10 Unit(s) SubCutaneous at bedtime  insulin lispro (ADMELOG) corrective regimen sliding scale   SubCutaneous three times a day before meals  insulin lispro (ADMELOG) corrective regimen sliding scale   SubCutaneous <User Schedule>  insulin lispro Injectable (ADMELOG) 5 Unit(s) SubCutaneous before breakfast  insulin lispro Injectable (ADMELOG) 5 Unit(s) SubCutaneous before lunch  insulin lispro Injectable (ADMELOG) 3 Unit(s) SubCutaneous before dinner  levothyroxine 75 MICROGram(s) Oral daily  midodrine. 30 milliGRAM(s) Oral <User Schedule>  midodrine. 20 milliGRAM(s) Oral <User Schedule>  Nephro-molly 1 Tablet(s) Oral daily  pantoprazole    Tablet 40 milliGRAM(s) Oral every 24 hours  psyllium Powder 1 Packet(s) Oral three times a day  pyridostigmine 60 milliGRAM(s) Oral <User Schedule>  triamcinolone 0.1% Ointment 1 Application(s) Topical two times a day    MEDICATIONS  (PRN):  dextrose Oral Gel 15 Gram(s) Oral once PRN Blood Glucose LESS THAN 70 milliGRAM(s)/deciliter  loperamide 2 milliGRAM(s) Oral three times a day PRN loose stools      I&O's Summary    13 Jun 2024 07:01  -  14 Jun 2024 07:00  --------------------------------------------------------  IN: 1080 mL / OUT: 1450 mL / NET: -370 mL        Vital Signs Last 24 Hrs  T(C): 36.4 (14 Jun 2024 05:36), Max: 36.8 (13 Jun 2024 20:14)  T(F): 97.5 (14 Jun 2024 05:36), Max: 98.2 (13 Jun 2024 20:14)  HR: 64 (14 Jun 2024 05:36) (64 - 80)  BP: 135/57 (14 Jun 2024 05:36) (135/57 - 163/74)  BP(mean): --  RR: 18 (14 Jun 2024 05:36) (18 - 18)  SpO2: 96% (14 Jun 2024 05:36) (96% - 100%)    Parameters below as of 14 Jun 2024 05:36  Patient On (Oxygen Delivery Method): room air        =================PHYSICAL EXAM=================    GENERAL: NAD, lying in bed comfortably  HEAD:  Atraumatic, Normocephalic  EYES: EOMI, PERRLA, conjunctiva and sclera clear  ENT: Moist mucous membranes  NECK: Supple, No JVD  CHEST/LUNG: Clear to auscultation bilaterally; No rales, rhonchi, wheezing, or rubs. Unlabored respirations  HEART: Regular rate and rhythm; No murmurs, rubs, or gallops  ABDOMEN: Bowel sounds present; Soft, Nontender, Nondistended. No hepatomegally  NERVOUS SYSTEM:  Alert & Oriented X3, speech clear. No deficits   MSK: FROM all 4 extremities, full and equal strength  SKIN: No rashes or lesions    =================================================    LABS:                        10.9   7.80  )-----------( 176      ( 13 Jun 2024 09:35 )             36.1     Auto Eosinophil # x     / Auto Eosinophil % x     / Auto Neutrophil # x     / Auto Neutrophil % x     / BANDS % x        06-13    139  |  107  |  20  ----------------------------<  99  4.6   |  20<L>  |  1.66<H>    Ca    8.7      13 Jun 2024 09:35  Mg     2.0     06-13  Phos  3.8     06-13          Urinalysis Basic - ( 13 Jun 2024 09:35 )    Color: x / Appearance: x / SG: x / pH: x  Gluc: 99 mg/dL / Ketone: x  / Bili: x / Urobili: x   Blood: x / Protein: x / Nitrite: x   Leuk Esterase: x / RBC: x / WBC x   Sq Epi: x / Non Sq Epi: x / Bacteria: x            RADIOLOGY & ADDITIONAL TESTS:    Imaging Personally Reviewed:    Consultant(s) Notes Reviewed:      Care Discussed with Consultants/Other Providers:

## 2024-06-14 NOTE — PROGRESS NOTE ADULT - ATTENDING SUPERVISION STATEMENT
Resident
Resident/Fellow
Resident
Fellow
Resident
Resident
Resident/Fellow
Fellow
Resident
Fellow
Resident
Fellow
Resident/Fellow
Resident/Fellow
Fellow
Fellow
Resident

## 2024-06-14 NOTE — DISCHARGE NOTE NURSING/CASE MANAGEMENT/SOCIAL WORK - NSDCFUADDAPPT_GEN_ALL_CORE_FT
================================  Podiatry Discharge Instructions:  - Follow up: Please follow up with Dr. Meza within 1 week of discharge from the hospital, please call [phone] for appointment and discuss that you recently were seen in the hospital.  - Wound Care: please apply Mupirocin to L anterior ankle wound and R plantar forefoot wound followed by 4x4 Gauze, and Kerlex. DAILY   - Weight bearing: Please weight bearing as tolerated in a surgical shoes and - STRICT decubitus precautions, Z- FLOWS boots at all time when in bed and in chair resting.    - Antibiotics: Please continue as instructed.  ================================

## 2024-06-14 NOTE — PROGRESS NOTE ADULT - PROVIDER SPECIALTY LIST ADULT
Cardiology
Electrophysiology
Endocrinology
Gastroenterology
Infectious Disease
Infectious Disease
Internal Medicine
Internal Medicine
JEOVANNY
Nephrology
Structural Heart
Wound Care
Wound Care
CCU
Cardiology
Dermatology
Electrophysiology
Electrophysiology
Endocrinology
Gastroenterology
Infectious Disease
Internal Medicine
JEOVANNY
Nephrology
Podiatry
Rehab Medicine
Rehab Medicine
Structural Heart
Cardiology
Dermatology
Electrophysiology
Endocrinology
Endocrinology
Gastroenterology
Internal Medicine
JEOVANNY
Nephrology
Structural Heart
Wound Care
Cardiology
Dermatology
Electrophysiology
Endocrinology
Endocrinology
Gastroenterology
Infectious Disease
Infectious Disease
Internal Medicine
JEOVANNY
Nephrology
Podiatry
Wound Care
Cardiology
Endocrinology
Internal Medicine
Internal Medicine
JEOVANNY
Wound Care
Cardiology
Cardiology
Endocrinology
Internal Medicine
JEOVANNY
Cardiology
Cardiology
Endocrinology
Internal Medicine
Cardiology
Cardiology
Endocrinology
Internal Medicine
Internal Medicine
Endocrinology
Internal Medicine
Internal Medicine
Endocrinology
Internal Medicine
Endocrinology
Internal Medicine
Endocrinology
Endocrinology
Internal Medicine

## 2024-07-17 NOTE — PROGRESS NOTE ADULT - NUTRITIONAL ASSESSMENT
[facility] Cardiology Progress Note    Tahir Kapoor Patient Status:  Inpatient    4/15/1948 MRN [id number]   Location [facility] 3NE-A Attending Joesph Guillen MD   Hosp Day # 2 PCP No primary care provider on file.     Subjective:  No acute events overnight  Currently working with physical therapy   No acute complaints     Objective:  /68 (BP Location: Right arm)   Pulse 69   Temp 98 °F (36.7 °C) (Tympanic)   Resp 18   Ht 5' 8\" (1.727 m)   Wt 197 lb 5 oz (89.5 kg)   SpO2 97%   BMI 30.00 kg/m²     Telemetry: nsr       Intake/Output:    Intake/Output Summary (Last 24 hours) at 2024 1131  Last data filed at 2024 0430  Gross per 24 hour   Intake --   Output 500 ml   Net -500 ml       Last 3 Weights   07/15/24 2318 197 lb 5 oz (89.5 kg)   07/15/24 1522 180 lb (81.6 kg)   13 0900 202 lb 13.2 oz (92 kg)       Labs:  Recent Labs   Lab 24  1050 07/15/24  1537 24  0830   * 343* 295*   BUN 25* 26* 15   CREATSERUM 1.17 1.03 0.97   EGFRCR 65 76 81   CA 9.4 9.5 8.9    136 136   K 4.4 4.2 4.1    104 106   CO2 28.0 26.0 27.0     Recent Labs   Lab 07/15/24  1537 24  0830   RBC 4.38 3.88   HGB 14.2 12.5*   HCT 39.2 34.5*   MCV 89.5 88.9   MCH 32.4 32.2   MCHC 36.2 36.2   RDW 12.3 12.6   NEPRELIM 5.42 4.27   WBC 9.5 8.1   .0 176.0         No results for input(s): \"TROP\", \"TROPHS\", \"CK\" in the last 168 hours.    Diagnostics:             Physical Exam:    Physical Exam  Cardiovascular:      Rate and Rhythm: Normal rate and regular rhythm.   Pulmonary:      Effort: Pulmonary effort is normal.   Musculoskeletal:      Right lower leg: No edema.   Neurological:      Mental Status: He is alert and oriented to person, place, and time.         Medications:   insulin degludec  22 Units Subcutaneous Nightly    clopidogrel  75 mg Oral Daily    aspirin  300 mg Rectal Daily    Or    aspirin  325 mg Oral Daily    enoxaparin  40 mg Subcutaneous Daily     insulin aspart  1-68 Units Subcutaneous TID CC    amLODIPine  2.5 mg Oral BID    atorvastatin  40 mg Oral Nightly    losartan  50 mg Oral Daily    metoprolol succinate  50 mg Oral Daily Beta Blocker    insulin aspart  1-68 Units Subcutaneous TID AC&HS      sodium chloride 75 mL/hr at 07/17/24 0257       Assessment:      Multifocal punctate acute/subacute infarcts involving the left cerebellar hemisphere and left posterior medulla.   Plan for Loop recorder implant today   Now on aspirin and plavix   CAD w/ h/o PCI-RCA  No angina   HTN  SBP 120s-150s  Plan to enroll in Remote   DM2  A1c 10.3  HL- LDL 63 on Lipitor    Plan:    CVA in patient with vascular disease. Plan for Loop recorder implant today.  Can discharge home after.  Will enroll in remote patient monitoring for bp assessment long term.     Yamini Abreu, APRN  7/17/2024  11:31 AM  Ph [phone] ([place])  Ph [phone] ([place])        =======================================================  Patient seen and examined independently.  Note reviewed and labs reviewed.  Agree with above assessment and plan.    Physical exam:  GEN: Alert and orient, no acute distress  CV: rrr, S1S2, no m/g/r  LUNGS: CTA b/l no obvious wheezing, rales or rhonchi  EXT: no b/l LE edema  ABD: soft, NTND    I have personally performed the medical decision making in its entirety. My additions include: None.    D/w SAMI Abreu, patient and wife.    Dewayne Alexander MD     This patient has been assessed with a concern for Malnutrition and has been determined to have a diagnosis/diagnoses of Morbid obesity (BMI > 40).    This patient is being managed with:   Diet Consistent Carbohydrate w/Evening Snack-  Entered: Apr 26 2024  6:07AM

## 2024-07-17 NOTE — ED ADULT NURSE NOTE - NSFALLASSISTNEEDED_ED_ALL_ED
Called and spoke with the patient, patient set up for an appointment with Mai Crowder NP per recommendations of Dr. Andrea, patient verbalized understanding of this.  
Take all medications as prescribed per pharmacy instructions.      Download the [employer] annia to access your health records & test results.  Please remember that you had a visit to the emergency room today and this does not substitute as primary care services for ongoing management because emergency services is a snap shot in time.  Should you have any worsening condition that requires emergency services do not hesitate to return to the ER.    COVID-19 TESTING  Hot Line [phone]  [address] [place] [address]  [facility]  Hours: 8am-5pm Monday - Friday   8am-noon Saturday - Sunday  
Walking

## 2024-07-23 PROCEDURE — 97530 THERAPEUTIC ACTIVITIES: CPT | Mod: GP

## 2024-07-23 PROCEDURE — 82962 GLUCOSE BLOOD TEST: CPT

## 2024-07-23 PROCEDURE — 87635 SARS-COV-2 COVID-19 AMP PRB: CPT

## 2024-07-23 PROCEDURE — 36415 COLL VENOUS BLD VENIPUNCTURE: CPT

## 2024-07-23 PROCEDURE — 93005 ELECTROCARDIOGRAM TRACING: CPT

## 2024-07-23 PROCEDURE — 71045 X-RAY EXAM CHEST 1 VIEW: CPT

## 2024-07-23 PROCEDURE — 97112 NEUROMUSCULAR REEDUCATION: CPT | Mod: GP

## 2024-07-23 PROCEDURE — 80048 BASIC METABOLIC PNL TOTAL CA: CPT

## 2024-07-23 PROCEDURE — 97535 SELF CARE MNGMENT TRAINING: CPT | Mod: GO

## 2024-07-23 PROCEDURE — 97110 THERAPEUTIC EXERCISES: CPT | Mod: GP

## 2024-07-23 PROCEDURE — 85025 COMPLETE CBC W/AUTO DIFF WBC: CPT

## 2024-07-23 PROCEDURE — 70450 CT HEAD/BRAIN W/O DYE: CPT | Mod: MC

## 2024-07-23 PROCEDURE — 97116 GAIT TRAINING THERAPY: CPT | Mod: GP

## 2024-07-23 PROCEDURE — 83036 HEMOGLOBIN GLYCOSYLATED A1C: CPT

## 2024-07-23 PROCEDURE — 85027 COMPLETE CBC AUTOMATED: CPT

## 2024-07-23 PROCEDURE — 80053 COMPREHEN METABOLIC PANEL: CPT

## 2024-07-23 PROCEDURE — 97161 PT EVAL LOW COMPLEX 20 MIN: CPT | Mod: GP

## 2024-07-23 PROCEDURE — 83605 ASSAY OF LACTIC ACID: CPT

## 2024-07-23 PROCEDURE — 97165 OT EVAL LOW COMPLEX 30 MIN: CPT | Mod: GO

## 2024-09-27 NOTE — VITALS
Quality 131: Pain Assessment And Follow-Up: Pain assessment using a standardized tool is documented as negative, no follow-up plan required Quality 130: Documentation Of Current Medications In The Medical Record: Current Medications Documented Quality 431: Preventive Care And Screening: Unhealthy Alcohol Use - Screening: Patient screened for unhealthy alcohol use using a single question and scores less than 2 times per year Quality 110: Preventive Care And Screening: Influenza Immunization: Influenza Immunization previously received during influenza season Quality 226: Preventive Care And Screening: Tobacco Use: Screening And Cessation Intervention: Patient screened for tobacco use and is an ex/non-smoker Detail Level: Detailed Quality 111:Pneumonia Vaccination Status For Older Adults: Pneumococcal Vaccination Previously Received [] : No [de-identified] : No Pain 0/10

## 2024-09-29 PROCEDURE — 85730 THROMBOPLASTIN TIME PARTIAL: CPT

## 2024-09-29 PROCEDURE — 70450 CT HEAD/BRAIN W/O DYE: CPT | Mod: MC

## 2024-09-29 PROCEDURE — 85379 FIBRIN DEGRADATION QUANT: CPT

## 2024-09-29 PROCEDURE — 81001 URINALYSIS AUTO W/SCOPE: CPT

## 2024-09-29 PROCEDURE — 87040 BLOOD CULTURE FOR BACTERIA: CPT

## 2024-09-29 PROCEDURE — 36600 WITHDRAWAL OF ARTERIAL BLOOD: CPT

## 2024-09-29 PROCEDURE — 94003 VENT MGMT INPAT SUBQ DAY: CPT

## 2024-09-29 PROCEDURE — 85611 PROTHROMBIN TEST: CPT

## 2024-09-29 PROCEDURE — 83605 ASSAY OF LACTIC ACID: CPT

## 2024-09-29 PROCEDURE — 84484 ASSAY OF TROPONIN QUANT: CPT

## 2024-09-29 PROCEDURE — 82962 GLUCOSE BLOOD TEST: CPT

## 2024-09-29 PROCEDURE — 94760 N-INVAS EAR/PLS OXIMETRY 1: CPT

## 2024-09-29 PROCEDURE — 82805 BLOOD GASES W/O2 SATURATION: CPT

## 2024-09-29 PROCEDURE — 83010 ASSAY OF HAPTOGLOBIN QUANT: CPT

## 2024-09-29 PROCEDURE — 85025 COMPLETE CBC W/AUTO DIFF WBC: CPT

## 2024-09-29 PROCEDURE — 71250 CT THORAX DX C-: CPT | Mod: MC

## 2024-09-29 PROCEDURE — 82607 VITAMIN B-12: CPT

## 2024-09-29 PROCEDURE — 36430 TRANSFUSION BLD/BLD COMPNT: CPT

## 2024-09-29 PROCEDURE — 87086 URINE CULTURE/COLONY COUNT: CPT

## 2024-09-29 PROCEDURE — 84100 ASSAY OF PHOSPHORUS: CPT

## 2024-09-29 PROCEDURE — 71045 X-RAY EXAM CHEST 1 VIEW: CPT

## 2024-09-29 PROCEDURE — 83615 LACTATE (LD) (LDH) ENZYME: CPT

## 2024-09-29 PROCEDURE — P9047: CPT

## 2024-09-29 PROCEDURE — 83880 ASSAY OF NATRIURETIC PEPTIDE: CPT

## 2024-09-29 PROCEDURE — 85732 THROMBOPLASTIN TIME PARTIAL: CPT

## 2024-09-29 PROCEDURE — 86901 BLOOD TYPING SEROLOGIC RH(D): CPT

## 2024-09-29 PROCEDURE — 80053 COMPREHEN METABOLIC PANEL: CPT

## 2024-09-29 PROCEDURE — 83735 ASSAY OF MAGNESIUM: CPT

## 2024-09-29 PROCEDURE — 87186 SC STD MICRODIL/AGAR DIL: CPT

## 2024-09-29 PROCEDURE — 82803 BLOOD GASES ANY COMBINATION: CPT

## 2024-09-29 PROCEDURE — P9100: CPT

## 2024-09-29 PROCEDURE — 93308 TTE F-UP OR LMTD: CPT

## 2024-09-29 PROCEDURE — 85027 COMPLETE CBC AUTOMATED: CPT

## 2024-09-29 PROCEDURE — 86850 RBC ANTIBODY SCREEN: CPT

## 2024-09-29 PROCEDURE — 72125 CT NECK SPINE W/O DYE: CPT | Mod: MC

## 2024-09-29 PROCEDURE — 85610 PROTHROMBIN TIME: CPT

## 2024-09-29 PROCEDURE — 86900 BLOOD TYPING SEROLOGIC ABO: CPT

## 2024-09-29 PROCEDURE — 80076 HEPATIC FUNCTION PANEL: CPT

## 2024-09-29 PROCEDURE — 85045 AUTOMATED RETICULOCYTE COUNT: CPT

## 2024-09-29 PROCEDURE — 85384 FIBRINOGEN ACTIVITY: CPT

## 2024-09-29 PROCEDURE — 87150 DNA/RNA AMPLIFIED PROBE: CPT

## 2024-09-29 PROCEDURE — 87077 CULTURE AEROBIC IDENTIFY: CPT

## 2024-09-29 PROCEDURE — P9037: CPT

## 2024-09-29 PROCEDURE — 85670 THROMBIN TIME PLASMA: CPT

## 2024-09-29 PROCEDURE — 80202 ASSAY OF VANCOMYCIN: CPT

## 2024-09-29 PROCEDURE — 80048 BASIC METABOLIC PNL TOTAL CA: CPT

## 2024-09-29 PROCEDURE — 94002 VENT MGMT INPAT INIT DAY: CPT

## 2024-09-29 PROCEDURE — 36415 COLL VENOUS BLD VENIPUNCTURE: CPT

## 2024-09-29 PROCEDURE — 74176 CT ABD & PELVIS W/O CONTRAST: CPT | Mod: MC

## 2024-09-29 PROCEDURE — 82746 ASSAY OF FOLIC ACID SERUM: CPT

## 2024-09-29 PROCEDURE — 93005 ELECTROCARDIOGRAM TRACING: CPT

## 2024-10-10 NOTE — PATIENT CHOICE NOTE. - NSPTCHOICENOTES_GEN_ALL_CORE
Left voicemail on 10/10/2024 at 10:13 AM to have patient call the office about her appointment today that need to be rescheduled.  
Pt declined

## 2024-10-31 ENCOUNTER — APPOINTMENT (OUTPATIENT)
Dept: ELECTROPHYSIOLOGY | Facility: CLINIC | Age: 74
End: 2024-10-31

## 2024-12-19 NOTE — DIETITIAN INITIAL EVALUATION ADULT - PROBLEM/PLAN-6
Counseling and Referral of Other Preventative  (Italic type indicates deductible and co-insurance are waived)    Patient Name: Nelia Littlejohn  Today's Date: 12/19/2024    Health Maintenance       Date Due Completion Date    Lipid Panel 03/26/2029 3/26/2024    TETANUS VACCINE 04/09/2029 4/9/2019        No orders of the defined types were placed in this encounter.    The following information is provided to all patients.  This information is to help you find resources for any of the problems found today that may be affecting your health:                  Living healthy guide: www.[facility].[place].[facility]      Understanding Diabetes: www.diabetes.org      Eating healthy: www.cdc.gov/healthyweight      CDC home safety checklist: www.cdc.gov/steadi/patient.html      Agency on Aging: www.goea.[place].[facility]      Alcoholics anonymous (AA): www.aa.org      Physical Activity: www.tiffany.nih.gov/ga9bwgy      Tobacco use: www.quitwithusla.org         
DISPLAY PLAN FREE TEXT

## 2025-01-16 NOTE — DIETITIAN INITIAL EVALUATION ADULT - NSICDXPASTSURGICALHX_GEN_ALL_CORE_FT
No PAST SURGICAL HISTORY:  Fractured skull 1968    Frozen shoulder 2000    H/O Achilles tendon repair lengthened bilaterally, 2000    H/O cataract 2020    History of surgical removal of meniscus of knee left in 1971

## 2025-01-29 NOTE — DISCHARGE NOTE PROVIDER - NSDCFUSCHEDAPPT_GEN_ALL_CORE_FT
Rigoberto Luong  [facility] Physician [facility]  CARDIOLOGY [address] [facility] [address]  Scheduled Appointment: 04/04/2024     Ceci Kumar Physician [facility]  CARDIOLOGY [address] [facility]  Scheduled Appointment: 04/12/2024     positive S1/positive S2

## 2025-02-27 NOTE — ED ADULT TRIAGE NOTE - GLASGOW COMA SCALE: SCORE, MLM
----- Message from ESCOBAR Lamar sent at 2/26/2025  6:25 PM CST -----  Please schedule her a hospital follow up with me or Dr. Paris on Thursday afternoon or Friday if possible. Thank you!   15

## 2025-03-11 NOTE — ED ADULT NURSE NOTE - CHPI ED NUR SYMPTOMS POS
Additional Notes: Recommended Sarna lotion Render Risk Assessment In Note?: no Detail Level: Simple ABDOMINAL DISTENSION/PAIN

## 2025-03-28 NOTE — DISCHARGE NOTE PROVIDER - NSDCQMERRANDS_GEN_ALL_CORE
No
PAST MEDICAL HISTORY:  Anxiety     Atheroscler native arteries the extremities w/intermit claudication     Cigarette smoker     Depression     GERD (gastroesophageal reflux disease)     HLD (hyperlipidemia)     Hypertension     Insomnia     Intermittent claudication     Left leg swelling lower leg    Pain in left shoulder     Poor circulation of extremity Blockage of artery in LLE    Tear of left rotator cuff

## 2025-05-22 NOTE — PROGRESS NOTE ADULT - SUBJECTIVE AND OBJECTIVE BOX
[place].  She recently moved to [place] PATIENT:  EVELYN LOPEZ  [id number]    CHIEF COMPLAINT:  Patient is a 73y old  Female who presents with a chief complaint of Syncope (05 May 2024 19:24)      INTERVAL HISTORYOVERNIGHT EVENTS:      REVIEW OF SYSTEMS:    Constitutional:     [ ] negative [ ] fevers [ ] chills [ ] weight loss [ ] weight gain  HEENT:                  [ ] negative [ ] dry eyes [ ] eye irritation [ ] postnasal drip [ ] nasal congestion  CV:                         [ ] negative  [ ] chest pain [ ] orthopnea [ ] palpitations [ ] murmur  Resp:                     [ ] negative [ ] cough [ ] shortness of breath [ ] dyspnea [ ] wheezing [ ] sputum [ ] hemoptysis  GI:                          [ ] negative [ ] nausea [ ] vomiting [ ] diarrhea [ ] constipation [ ] abd pain [ ] dysphagia   :                        [ ] negative [ ] dysuria [ ] nocturia [ ] hematuria [ ] increased urinary frequency  Musculoskeletal: [ ] negative [ ] back pain [ ] myalgias [ ] arthralgias [ ] fracture  Skin:                       [ ] negative [ ] rash [ ] itch  Neurological:        [ ] negative [ ] headache [ ] dizziness [ ] syncope [ ] weakness [ ] numbness  Psychiatric:           [ ] negative [ ] anxiety [ ] depression  Endocrine:            [ ] negative [ ] diabetes [ ] thyroid problem  Heme/Lymph:      [ ] negative [ ] anemia [ ] bleeding problem  Allergic/Immune: [ ] negative [ ] itchy eyes [ ] nasal discharge [ ] hives [ ] angioedema    [ ] All other systems negative  [ ] Unable to assess ROS because ________.    MEDICATIONS:  MEDICATIONS  (STANDING):  ascorbic acid 500 milliGRAM(s) Oral daily  aspirin  chewable 81 milliGRAM(s) Oral daily  atorvastatin 80 milliGRAM(s) Oral at bedtime  buMETAnide Infusion 1 mG/Hr (5 mL/Hr) IV Continuous <Continuous>  calamine/zinc oxide Lotion 1 Application(s) Topical three times a day  chlorhexidine 2% Cloths 1 Application(s) Topical daily  chlorhexidine 4% Liquid 1 Application(s) Topical <User Schedule>  epoetin mendoza-epbx (RETACRIT) Injectable 53933 Unit(s) IV Push <User Schedule>  ferrous    sulfate 325 milliGRAM(s) Oral two times a day  heparin   Injectable 5000 Unit(s) SubCutaneous every 8 hours  insulin glargine Injectable (LANTUS) 32 Unit(s) SubCutaneous at bedtime  insulin lispro (ADMELOG) corrective regimen sliding scale   SubCutaneous Before meals and at bedtime  insulin lispro Injectable (ADMELOG) 8 Unit(s) SubCutaneous three times a day before meals  levothyroxine 75 MICROGram(s) Oral daily  midodrine 20 milliGRAM(s) Oral every 8 hours  multivitamin 1 Tablet(s) Oral daily  mupirocin 2% Ointment 1 Application(s) Topical three times a day  senna 2 Tablet(s) Oral at bedtime  vasopressin Infusion 0.02 Unit(s)/Min (3 mL/Hr) IV Continuous <Continuous>    MEDICATIONS  (PRN):  polyethylene glycol 3350 17 Gram(s) Oral two times a day PRN Constipation      ALLERGIES:  Allergies    contrast media (iodine-based) (Fever; Vomiting; Flushing; Hypotension; Rash; Stomach Upset)  Ativan (Rash; Urticaria)  penicillins (Hives (Mod to Severe); Anaphylaxis (Mod to Severe); Short breath (Mod to Severe); Angioedema (Mod to Severe))    Intolerances        OBJECTIVE:  ICU Vital Signs Last 24 Hrs  T(C): 36.9 (06 May 2024 03:00), Max: 37 (05 May 2024 19:00)  T(F): 98.5 (06 May 2024 03:00), Max: 98.6 (05 May 2024 19:00)  HR: 75 (06 May 2024 06:30) (72 - 113)  BP: 123/56 (05 May 2024 18:00) (99/69 - 139/60)  BP(mean): 81 (05 May 2024 18:00) (77 - 89)  ABP: 122/40 (06 May 2024 06:30) (77/38 - 235/174)  ABP(mean): 66 (06 May 2024 06:30) (50 - 208)  RR: 20 (06 May 2024 06:30) (15 - 53)  SpO2: 90% (06 May 2024 06:30) (76% - 99%)    O2 Parameters below as of 06 May 2024 06:00  Patient On (Oxygen Delivery Method): room air            Adult Advanced Hemodynamics Last 24 Hrs  CVP(mm Hg): --  CVP(cm H2O): --  CO: --  CI: --  PA: --  PA(mean): --  PCWP: --  SVR: --  SVRI: --  PVR: --  PVRI: --  CAPILLARY BLOOD GLUCOSE      POCT Blood Glucose.: 151 mg/dL (05 May 2024 21:21)  POCT Blood Glucose.: 125 mg/dL (05 May 2024 16:34)  POCT Blood Glucose.: 162 mg/dL (05 May 2024 12:23)  POCT Blood Glucose.: 271 mg/dL (05 May 2024 08:15)    CAPILLARY BLOOD GLUCOSE      POCT Blood Glucose.: 151 mg/dL (05 May 2024 21:21)    I&O's Summary    04 May 2024 07:01  -  05 May 2024 07:00  --------------------------------------------------------  IN: 1547 mL / OUT: 4530 mL / NET: -2983 mL    05 May 2024 07:01  -  06 May 2024 06:56  --------------------------------------------------------  IN: 1388 mL / OUT: 3340 mL / NET: -1952 mL      Daily     Daily Weight in k.3 (06 May 2024 04:00)    PHYSICAL EXAMINATION:  General: WN/WD NAD  HEENT: PERRLA, EOMI, moist mucous membranes  Neurology: A&Ox3, nonfocal, PEREZ x 4  Respiratory: CTA B/L, normal respiratory effort, no wheezes, crackles, rales  CV: RRR, S1S2, no murmurs, rubs or gallops  Abdominal: Soft, NT, ND +BS, Last BM  Extremities: No edema, + peripheral pulses  Incisions:   Tubes:    LABS:  ABG - ( 06 May 2024 01:47 )  pH, Arterial: 7.39  pH, Blood: x     /  pCO2: 46    /  pO2: 66    / HCO3: 28    / Base Excess: 2.5   /  SaO2: 94.3                                    8.1    10.36 )-----------( 252      ( 06 May 2024 01:53 )             25.9     05-06    133<L>  |  93<L>  |  46<H>  ----------------------------<  172<H>  4.0   |  25  |  3.25<H>    Ca    8.6      06 May 2024 01:53  Phos  4.7     05-06  Mg     2.2     05-06    TPro  6.5  /  Alb  3.0<L>  /  TBili  0.5  /  DBili  x   /  AST  16  /  ALT  7<L>  /  AlkPhos  78  05-06    LIVER FUNCTIONS - ( 06 May 2024 01:53 )  Alb: 3.0 g/dL / Pro: 6.5 g/dL / ALK PHOS: 78 U/L / ALT: 7 U/L / AST: 16 U/L / GGT: x                   Urinalysis Basic - ( 06 May 2024 01:53 )    Color: x / Appearance: x / SG: x / pH: x  Gluc: 172 mg/dL / Ketone: x  / Bili: x / Urobili: x   Blood: x / Protein: x / Nitrite: x   Leuk Esterase: x / RBC: x / WBC x   Sq Epi: x / Non Sq Epi: x / Bacteria: x      ====================ASSESSMENT ==============  73F PMH: HFrEF (EF 30%), AS, LBBB, HTN, DM1, CKD, hypothyroidism, chronic lymphedema, LELIA (3L home O2) p/w for syncope 2/2 severe AS, s/p TAVR . Course complicated by contrast induced allergic reaction (had CTA for TAVR eval) and contrast related renal failure (acute on chronic) requiring HD. TAVR c/b VT and vfib requiring shock and flash pulmonary edema causing AHRF requiring intubation. Currently extubated,  on CVVPHD for fluid removal, and on pressor support for vasoplegia and hypovolemia due to CRRT.    NEURO:  - Extubated   - Currently AOx4  - OOBTC, c/w PT as tolerated  - PT recommends HONEY for eventual dispo    PULM  # AHRF 2/2 flash pulmonary edema  # LELIA (on 3L home oxygen)  - Extubated   -  CXR - no consolidation or pleural effusion    CV  #Shock, Vasoplegic  -  vasoplegia from sedation vs losing fluid from CVVHD vs distributive septic shock  - on vasopressin 0.02, maintain for renal perfusion as UO significantly drops when Vaso turned off despite holding BP  - endo ruling out Cushing's   - Midodrine 20 TID     #VT/vfib s/p shock  #2:1 AV block  #Severe AS s/p TAVR  - Known LBBB with first degree AV block on telemetry  - s/p Micra , no longer requiring TVP (eventual candidate for CRT per EP)  - changes noted on telemetry requiring EP f/u and possible interrogation of device    # HFrEF  - EF 25%  - Holding GDMT while on pressors    /RENAL  #ESRD  - i/s/o ATN and contrast nephropathy  - c/w pressor support   - Strict I/Os  - Trend BMP, lytes  - Avoid nephrotoxins  - Nephro following- pt still remains fluid overloaded, start IHD likely tomorrow  - start midodrine support 10 mg q8h  - c/w bumex drip 2mg/hr and monitor for UOP  - monitor daily for initiation of HD eventually    GI  - tolerating  diet    ENDO  # DM1  - on  basal/bolus lantus 32 QHS, admelog dec to 8u TIDAC   - monitor POCT FS, goal 140-180    # Hypothyroidism  - c/w home levothyroxine 75mcg    #Cushing  # L adrenal nodule  -dexamethasone suppression test w/ am cortisol x2 completed, pt is pos for Cushing's disease, f/u outpt, recheck once critical illness resolves  -f/u [place] salivary cortisol  - CT A/P demonstrating normal adrenals    SKIN  # Acute generalized exanthematous pustulosis   - c/w calamine,  Vaseline    # Psoriasis   # Lymphedema   # R. Diabetic foot ulcer  - Elevate legs  - Compression stockings, ACE wrapping  - Podiatry following- foot wounds management, decubiti precautions f/u outpt, check for iodine gel allergy    ID  #leukocytosis  - afebrile, bcx neg  - Monitor WBC and for fevers, and signs of infection    # Oral Candidiasis  - c/w nystatin     Heme  #Anemia, worsening  -AARTI vs AOCD vs anemia 2/2 blood loss vs myelosuppression iso critical illness  -trend H&H, transfuse PRN    #thrombocytopenia  -  myelosuppression iso critical illness  - no active signs of bleeding, monitor plts, transfuse PRN     Whitney Arora MD

## 2025-07-30 NOTE — ED ADULT NURSE NOTE - NS ED NOTE ABUSE SUSPICION NEGLECT YN
left TOTAL HIP ARTHROPLASTY     Date: 2025   OR Location: [facility]    Name: Sue Vail  : 1943    MRN: [id number]    Diagnosis  Pre-op Diagnosis      * Primary osteoarthritis of left hip [M16.12]  Post-op Diagnosis     * Primary osteoarthritis of left hip [M16.12]      Procedures  Left Total Hip Arthroplasty *23 HR OBS* (CARMEN INSIGNIA)  [address]894 - [place] ARTHRP ACETBLR/PROX FEM PROSTC AGRFT/ALGRFT      Surgeons      * Cj Burton - Primary     Specimen: none    Staff: Circulator: Ivonne Bailey RN  Scrub Person: Markus Heck     Drains and/or Catheters: none    Estimated Blood Loss: 100 cc    Resident/Fellow/Other Assistant:  Surgeons and Role:     * Becky Lazaro PA-C - [employer] Assist    was present for the entire case. Their assistance was necessary and critical to the successful completion of the procedure.They provided skilled assistance with leg positioning, retraction, leg manipulation, implant insertion and wound closure.  A qualified assistant was not available to perform their portion of the case.    Procedure Summary  Anesthesia: Spinal    Anesthesia Staff: CRNA: FABIÁN Hernandez-CRNA; FABIÁN Vidales-CRNA   ASA: III       Intra-op Medications:   - 1 Gram Tranexamic acid prior to surgical incision  - TR[place]ON Pain cockail: ropivacaine-epinephrine-clonidine-ketorolac 2.46-0.005- 0.0008-0.3mg/mL periarticular syringe: 50 cc    IMPLANTS:   Implant Name Type Inv. Item Serial No.  Lot No. LRB No. Used Action   SCREW, LOW PROFILE HEX, 6.5 X 25 MM - [id number] Screw SCREW, LOW PROFILE HEX, 6.5 X 25 MM  [id number] Left 1 Implanted   SHELL, TRIDENT II, CLUSTERHOLE, 50D - [id number] Joint SHELL, TRIDENT II, CLUSTERHOLE, 50D  [employer] [id number] Left 1 Implanted   INSERT, TRIDENT X3 POLYETHYLENE, 0 DEG, 36MM D - [id number] Joint INSERT, TRIDENT X3 POLYETHYLENE, 0 DEG, 36MM D  [employer] [id number] Left 1 Implanted   STEM, FEMUR 127D SZ 5 - [id number]  Joint STEM, FEMUR 127D SZ 5  [employer] [id number] Left 1 Implanted   HEAD, FEMUR V40 36MM -2.5MM BIOL[employer] DELTA - [id number] Joint HEAD, FEMUR V40 36MM -2.5MM BIOLOX DELTA  [employer] [id number] Left 1 Implanted       Findings: See operative note    Operative Indications:  Sue Vail is a patient that is well-known to me and initially presented as an outpatient. They have been treated for advanced hip osteoarthritis with therapy, anti-inflammatories, and activity modification, all without improvement of symptoms. They are here for surgery for Pre-op Diagnosis      * Primary osteoarthritis of left hip [M16.12].     We therefore reviewed the alternative option of elective total hip arthroplasty. The risks and benefits of this procedure were discussed in detail as an outpatient and are documented in our outpatient record. The patient expressed full understanding and wished to proceed. Informed consent was obtained and was placed on the medical chart    The risks, benefits and potential complications of the arthroplasty surgery were discussed with the patient in detail.  Risks including but not limited to the risk of anesthesia, DVT, pulmonary embolism with the possibility of death, retained infection, and neurovascular injury.  Specific details of the procedure, hospitalization, recovery, rehabilitation, and long-term precautions were also provided. Pre-operative teaching was provided. Implant/prosthesis selection was outlined, and the many options available were explained; the final choice will be made at the time of the procedure to match the anatomy and condition of the bone, ligaments, tendons, and muscles. Understanding of all topics was conveyed to me by the patient, and consent was given to proceed with a total hip arthroplasty.     On the day of surgery the site of surgery was properly noted/marked if necessary per policy. The patient has been actively warmed in preoperative area. Preoperative  antibiotics were confirmed and started prior to surgical incision. Venous thrombosis prophylaxis have been ordered including bilateral sequential compression devices to start in pre-operative area.     Procedure In Detail:  The patient was identified in the preoperative area .Their hip was then marked by myself and the patient agreed to proceed with the outlined procedure.. They were transferred to the operating room and positioned supine on the OR table. Appropriate surgical huddle was performed with myself present. Anesthesia was then successfully induced. The patient was then positioned in the lateral decubitus position on a pegboard. All bony prominences were well-padded. The operative lower extremity was then prepped and draped in the normal sterile fashion. A critical pause was taken and we identified the patient, the site of surgery, as well as the administration of preoperative IV antibiotics. The limb was then positioned neutral on a padded Haq stand and bony landmarks were identified on the skin.    A posterior- superior hip incision was then performed with the #10 blade scalpel and a minimally invasive direct superior approach was made. The subcutaneous tissues were developed down to the level of the fascia. Electrocautery was used to achieve hemostasis. The fibers of the gluteus jackie were split bluntly just short of the level of the fascia ella.The exposed pericapsular fat was removed with electrocautery. The interval between the gluteus medius and the piriformis tendon was then developed. The conjoint tendon was isolated and released from its insertion on the trochanter, tagged and retracted posteriorly, protecting the sciatic nerve through the remainder of the case. The gluteus minimus was elevated from the capsule and a capsulotomy was then performed. Intracapsular retractors were positioned around the femoral neck and the hip was dislocated posteriorly. The dislocated femoral head was noted to be  eburnated over the majority of its surface with circumferential head and neck junction osteophytes. Using a saw, a provisional femoral neck osteotomy was then performed at a level based on the preoperative template. The femoral head was excised. The limb was then positioned neutral on a Haq stand and I proceeded with acetabular exposure.     A Antonio-type retractor was placed over the anterior column and an inferior capsular retractor was positioned below the acetabular teardrop. The acetabulum was prepared with hemispherical reamers. Starting with a size 45 mm reamer, the acetabular bed was medialized to the level of the medial wall. Reamers were then directed posteriorly and superiorly into the sclerotic subchondral bone. Once good, bleeding cancellous bone was encountered in all four quadrants we stopped reaming. The corresponding size of the last reamer was used to select a  Trident II hemispherical shell. This was then opened and impacted in approximately 40 degrees of abduction and 20 degrees of anteversion. One cancellous bone screw was placed in the posterior superior quadrant and the polyethylene liner was then impacted into the shell. The retractors were then removed and attention was drawn towards the femur.     The femur was delivered into the wound and a box chisel was placed into the piriformis fossa. A canal awl was placed down the canal without resistance. Using the Accolade Broach System, the femur was prepared to accept a broach with good fill and rotational stability. With this in place, there was good rotational and axial stability. The broach was noted to seat at the level of the calcar resection. No fractures were identified. Multiple trial head and neck combinations were then made and the reduced hip was stable to 90 degrees of flexion, 70 degrees of internal rotation, and 20 degrees of adduction. The hip could be fully extended, externally rotated, and abducted without any anterior  dislocation. The leg lengths were restored equally. Intraoperative radiographs were then obtained which demonstrated satisfactory positioning of the components. No fractures were identified.     Satisfied with the reconstruction, the hip was dislocated and the femoral trials were removed. The final femoral stem and head were then impacted without complication. The hip was reduced one final time and all of the tissues were thoroughly irrigated. An anesthetic injection cocktail was infiltrated throughout the soft tissues. The capsule was repaired anatomically with #1 Vicryl suture. The conjoint tendon was approximated to the posterior edge of the trochanter with #1 Vicryl suture and the gluteal fascia was repaired with interrupted suture. The rest of the incision was closed in layers with a final layer of monocryl and skin glue. Occlusive dressing was then applied.  The drapes were then removed. The patient was then transferred to the PACU in stable condition. All counts were correct at the end of the case.     Complications:  None; patient tolerated the procedure well.    Disposition: PACU - hemodynamically stable.  Condition: stable      Plan:                         Weight Bearing Status:  WBAT Bilateral LE                        Range of Motion: As tolerated                        [place]: none placed                        Dressing: Maintain for one week                        DVT Prophylaxis:  ASA 81mg BID x 4 weeks                         Antibiotics: Continue x24 hours ean-operatively dc home with one wee bactrim DS                        Discharge/Follow-up: Follow up in clinic in two weeks      Cj Burton DO  Attending Surgeon  Joint Replacement and Adult Reconstructive Surgery  [place], [place]      Attending Attestation: I was present and scrubbed for the entire procedure.    This note was dictated using speech recognition software and was not corrected for spelling or grammatical  errors            No

## (undated) DEVICE — GLV 7.5 PROTEXIS (WHITE)

## (undated) DEVICE — VENODYNE/SCD SLEEVE CALF MEDIUM

## (undated) DEVICE — DRSG STERISTRIPS 0.5 X 4"

## (undated) DEVICE — PACING CABLE TEMP MEDTRONIC WITH PAC-LOC

## (undated) DEVICE — DRAPE 1/2 SHEET 40X57"

## (undated) DEVICE — GLV 8 RADIATION

## (undated) DEVICE — SPECIMEN CONTAINER 100ML

## (undated) DEVICE — VESSEL LOOP MAXI-RED  0.120" X 16"

## (undated) DEVICE — DRAIN CHANNEL 19FR ROUND FULL FLUTED

## (undated) DEVICE — DRSG OPSITE 2.5 X 2"

## (undated) DEVICE — TUBING CONTRAST INJECTION HIGH PRESSURE 1200PSI 72"

## (undated) DEVICE — SUT SOFSILK 2 60" TIES

## (undated) DEVICE — DRAPE 3/4 SHEET W REINFORCEMENT 56X77"

## (undated) DEVICE — STOPCOCK 3-WAY

## (undated) DEVICE — VENTING ADAPTER "Y" (RED/BLUE) 7.5"

## (undated) DEVICE — PREP CHLORAPREP HI-LITE ORANGE 26ML

## (undated) DEVICE — SUT SILK 0 30" TIES

## (undated) DEVICE — STEALTH CLAMP INSERT FIBRA/FIBRA 60MM

## (undated) DEVICE — BLADE SCALPEL SAFETYLOCK #10

## (undated) DEVICE — PACING CABLE A/V TEMP SCREW DOWN 6FT

## (undated) DEVICE — GLV 8 PROTEXIS (WHITE)

## (undated) DEVICE — BULLDOG SPRING CLIP 6MM SOFT/SOFT

## (undated) DEVICE — DRAPE MAYO STAND 30"

## (undated) DEVICE — ELCTR HEX BLADE

## (undated) DEVICE — GLV 6 PROTEXIS W HYDROGEL

## (undated) DEVICE — SUT BIOSYN 4-0 18" P-12

## (undated) DEVICE — NDL COUNTER FOAM AND MAGNET 40-70

## (undated) DEVICE — SUT PROLENE 3-0 36" SH

## (undated) DEVICE — SUT POLYSORB 2-0 30" GS-21 UNDYED

## (undated) DEVICE — DRAPE TOWEL BLUE 17" X 24"

## (undated) DEVICE — SYR ASEPTO

## (undated) DEVICE — DRAPE INSTRUMENT POUCH 6.75" X 11"

## (undated) DEVICE — GLV 7 PROTEXIS (WHITE)

## (undated) DEVICE — LAP PAD 18 X 18"

## (undated) DEVICE — SUT POLYSORB 2 30" GS-26

## (undated) DEVICE — PACK CARDIAC YELLOW

## (undated) DEVICE — Device

## (undated) DEVICE — SUT PDS II 3-0 36" SH

## (undated) DEVICE — SPONGE DISSECTOR PEANUT

## (undated) DEVICE — SOL NORMOSOL-R PH7.4 1000ML

## (undated) DEVICE — MEDICATION LABELS W MARKER

## (undated) DEVICE — ELCTR BOVIE PENCIL HANDPIECE

## (undated) DEVICE — DRSG TEGADERM 6"X8"

## (undated) DEVICE — SUCTION TUBE CARDIAC SOFT TIP 6FR SHAFT 10FR TIP 6"

## (undated) DEVICE — GLV 6.5 PROTEXIS (WHITE)

## (undated) DEVICE — SUT BLUNT SZ 5

## (undated) DEVICE — SUMP INTRACARDIAC 20FR 1/4" ADULT

## (undated) DEVICE — PACK UNIVERSAL CARDIAC SUPPLEMNTAL B

## (undated) DEVICE — SUT PDS II 0 27" OS-6

## (undated) DEVICE — CONNECTOR STRAIGHT 3/8 X 3/8" W LUER LOCK

## (undated) DEVICE — MNFLD SETUP

## (undated) DEVICE — DRAPE FEMORAL ANGIOGRAPHY W TROUGH

## (undated) DEVICE — SENSOR MYOCARDIAL TEMP 15MM

## (undated) DEVICE — STEALTH CLAMP INSERT FIBRA/FIBRA 90MM

## (undated) DEVICE — GOWN LG

## (undated) DEVICE — CONNECTOR STRAIGHT 3/8 X 1/2"

## (undated) DEVICE — BLADE SCALPEL SAFETYLOCK #15

## (undated) DEVICE — WARMING BLANKET DUO-THERM HYPER/HYPOTHERM ADULT

## (undated) DEVICE — FOLEY TRAY 16FR 5CC LF LUBRISIL ADVANCE TEMP CLOSED

## (undated) DEVICE — DRSG TEGADERM 4X4.75"

## (undated) DEVICE — TUBING PRESSURE 100"

## (undated) DEVICE — ELCTR BOVIE PENCIL HANDPIECE ROCKER SWITCH 15FT

## (undated) DEVICE — WARMING BLANKET FULL UNDERBODY

## (undated) DEVICE — SUT BOOT STANDARD (ASSORTED) 5 PAIR

## (undated) DEVICE — SUT PLEDGET PRE PUNCH 4.8 X 9.5 X 1.5 MM

## (undated) DEVICE — DRAPE IOBAN 23" X 23"

## (undated) DEVICE — GOWN TRIMAX XXL

## (undated) DEVICE — SUT PROLENE 5-0 30" RB-2

## (undated) DEVICE — SUT PROLENE 5-0 36" RB-1

## (undated) DEVICE — SUT PROLENE 4-0 36" BB

## (undated) DEVICE — DRSG OPSITE 13.75 X 4"

## (undated) DEVICE — DRAPE LIGHT HANDLE COVER (BLUE)

## (undated) DEVICE — VALVE HEMOSTASIS GUARDIAN II

## (undated) DEVICE — SOL IRR POUR NS 0.9% 500ML

## (undated) DEVICE — INFLATION DEVICE BASIXTOUCH 30ML

## (undated) DEVICE — FOLEY TRAY 16FR 5CC LTX UMETER CLOSED

## (undated) DEVICE — BLADE SCALPEL SAFETYLOCK #11

## (undated) DEVICE — TOURNIQUET SET 12FR (1 RED, 1 BLUE, 1 SNARE) 7"

## (undated) DEVICE — FOLEY HOLDER STATLOCK 2 WAY ADULT

## (undated) DEVICE — SUT SURGICAL STEEL 6 30" BP-1

## (undated) DEVICE — NDL PERC BASEPLT 18GX7CM

## (undated) DEVICE — TUBING TRUWAVE PRESSURE MALE/FEMALE 12"

## (undated) DEVICE — SUT PROLENE 4-0 36" SH

## (undated) DEVICE — PACK UNIVERSAL CARDIAC

## (undated) DEVICE — STAPLER SKIN VISI-STAT 35 WIDE

## (undated) DEVICE — TUBING ATS SUCTION LINE

## (undated) DEVICE — DRSG MASTISOL

## (undated) DEVICE — SUCTION YANKAUER NO CONTROL VENT

## (undated) DEVICE — CHEST DRAIN PLEUR-EVAC WET/WET ADULT-PEDS SINGLE (QUICK)

## (undated) DEVICE — VISITEC 4X4

## (undated) DEVICE — TUBING SUCTION 20FT